# Patient Record
Sex: MALE | Race: WHITE | NOT HISPANIC OR LATINO | Employment: UNEMPLOYED | URBAN - METROPOLITAN AREA
[De-identification: names, ages, dates, MRNs, and addresses within clinical notes are randomized per-mention and may not be internally consistent; named-entity substitution may affect disease eponyms.]

---

## 2018-09-03 ENCOUNTER — OFFICE VISIT (OUTPATIENT)
Dept: URGENT CARE | Facility: URGENT CARE | Age: 53
End: 2018-09-03
Payer: COMMERCIAL

## 2018-09-03 VITALS
TEMPERATURE: 97.5 F | OXYGEN SATURATION: 98 % | HEART RATE: 124 BPM | SYSTOLIC BLOOD PRESSURE: 113 MMHG | RESPIRATION RATE: 18 BRPM | WEIGHT: 172 LBS | DIASTOLIC BLOOD PRESSURE: 82 MMHG

## 2018-09-03 DIAGNOSIS — R05.9 COUGH: Primary | ICD-10-CM

## 2018-09-03 PROCEDURE — 99203 OFFICE O/P NEW LOW 30 MIN: CPT | Performed by: INTERNAL MEDICINE

## 2018-09-03 RX ORDER — AZITHROMYCIN 250 MG/1
TABLET, FILM COATED ORAL
Qty: 6 TABLET | Refills: 0 | Status: SHIPPED | OUTPATIENT
Start: 2018-09-03 | End: 2020-01-20

## 2018-09-03 NOTE — PROGRESS NOTES
"SUBJECTIVE:  John Juárez is an 52 year old male who presents for cough and runny nose.  For about a week.  Cough is not productive of sputum.  No fevers.  Eating less than usual.  Vomited once.  No diarrhea.  No recent travel.  No medications taken for sxs.  No known exposures.  Has used ventolin inhaler which doesn't help cough.  Pt lives in a group home and is alone for visit and is somewhat poor historian.         has a past medical history of COPD (chronic obstructive pulmonary disease) (H); Heart disease; Hypertension; Substance abuse; and TBI (traumatic brain injury) (H). hypothyroidism. Possible mental illness. Constipation. Pt denies h/o seizures.  Pt give somewhat incomplete hx.    Social History     Social History     Marital status: Single     Spouse name: N/A     Number of children: N/A     Years of education: N/A     Social History Main Topics     Smoking status: Current Every Day Smoker     Packs/day: 0.50     Smokeless tobacco: Never Used     Alcohol use Yes      Comment: last use 1 hour ago\"     Drug use: Yes     Special: Marijuana      Comment: rare     Sexual activity: Not Asked     Other Topics Concern     None     Social History Narrative     History reviewed. No pertinent family history.    ALLERGIES:  Ibuprofen and Latex    Current Outpatient Prescriptions   Medication     GABAPENTIN PO     Omeprazole (PRILOSEC PO)   Thyroid mediation  Multiple other meds pt cannot recall.  No current facility-administered medications for this visit.          ROS:  ROS is done and is negative for general/constitutional, eye, ENT, Respiratory, cardiovascular, GI, , Skin, musculoskeletal except as noted elsewhere.  All other review of systems negative except as noted elsewhere.      OBJECTIVE:  /82 (BP Location: Left arm, Patient Position: Chair, Cuff Size: Adult Regular)  Pulse 124  Temp 97.5  F (36.4  C) (Oral)  Resp 18  Wt 172 lb (78 kg)  SpO2 98%  GENERAL APPEARANCE: Alert, in no acute " distress, somewhat un  EYES: normal  EARS: External ears normal. Canals clear. TM's normal.  NOSE:mild clear discharge and mildly inflamed mucosa  OROPHARYNX:normal, mmm  NECK:No adenopathy,masses or thyromegaly  RESP: intermittent coarse upper airway noise, no crackles or wheezing  CV:regular rate and rhythm and no murmurs, clicks, or gallops  ABDOMEN: Abdomen soft, non-tender. BS normal. No masses, organomegaly  SKIN: no ulcers, lesions or rash  MUSCULOSKELETAL:Musculoskeletal normal      RESULTS  Results for orders placed or performed during the hospital encounter of 03/25/15   Alcohol breath test POCT   Result Value Ref Range    Alcohol Breath Test 0.223 (A) 0.00 - 0.01   Alcohol breath test POCT   Result Value Ref Range    Alcohol Breath Test 0.171 (A) 0.00 - 0.01   .  No results found for this or any previous visit (from the past 48 hour(s)).    ASSESSMENT/PLAN:    ASSESSMENT / PLAN:  (R05) Cough  (primary encounter diagnosis)  Comment: has had sxs for about a week.  Overall c/w viral vs atypical URI.  Given pt's risk factors and other medical conditions including TBI and h/o alcohol abuse, will tx with zmax  Plan: azithromycin (ZITHROMAX) 250 MG tablet        Reviewed medication instructions and side effects. Follow up if experiences side effects. I reviewed supportive care, expected course, and signs of concern.  Follow up as needed or if he does not improve within 7 day(s) or if worsens in any way.  Reviewed red flag symptoms and is to go to the ER if experiences any of these.  Pt also to use his ventolin inhaler prn and continue all his routine medications at the group home.      See [facility] for orders, medications, letters, patient instructions    Lynda Burton M.D.

## 2018-09-03 NOTE — MR AVS SNAPSHOT
"              After Visit Summary   9/3/2018    John Juárez    MRN: [id number]           Patient Information     Date Of Birth          1965        Visit Information        Provider Department      9/3/2018 4:50 PM Lynda Burton MD [facility]        Today's Diagnoses     Cough    -  1      Care Instructions    Continue all your usual medications.    Add zithromax as prescribed for 5 days.    Use your ventolin inhaler as needed for cough.          Follow-ups after your visit        Follow-up notes from your care team     Return in about 1 week (around 9/10/2018), or if symptoms worsen or fail to improve, for follow up with primary doctor.      Who to contact     If you have questions or need follow up information about today's clinic visit or your schedule please contact [facility] directly at [phone].  Normal or non-critical lab and imaging results will be communicated to you by [facility], letter or phone within 4 business days after the clinic has received the results. If you do not hear from us within 7 days, please contact the clinic through [facility] or phone. If you have a critical or abnormal lab result, we will notify you by phone as soon as possible.  Submit refill requests through [facility] or call your pharmacy and they will forward the refill request to us. Please allow 3 business days for your refill to be completed.          Additional Information About Your Visit        MyChart Information     [facility] lets you send messages to your doctor, view your test results, renew your prescriptions, schedule appointments and more. To sign up, go to [email or web address] . Click on \"Log in\" on the left side of the screen, which will take you to the Welcome page. Then click on \"Sign up Now\" on the right side of the page.     You will be asked to enter the access code listed below, as well as some personal information. Please follow the directions to create " your username and password.     Your access code is: [email or web address]  Expires: 2018  5:15 PM     Your access code will  in 90 days. If you need help or a new code, please call your [facility] or [phone].        Care EveryWhere ID     This is your Care EveryWhere ID. This could be used by other organizations to access your [facility] medical records  [id number]        Your Vitals Were     Pulse Temperature Respirations Pulse Oximetry          124 97.5  F (36.4  C) (Oral) 18 98%         Blood Pressure from Last 3 Encounters:   18 113/82   03/25/15 113/79   03/08/15 (!) 155/95    Weight from Last 3 Encounters:   18 172 lb (78 kg)              Today, you had the following     No orders found for display         Today's Medication Changes          These changes are accurate as of 9/3/18  5:15 PM.  If you have any questions, ask your nurse or doctor.               Start taking these medicines.        Dose/Directions    azithromycin 250 MG tablet   Commonly known as:  ZITHROMAX   Used for:  Cough   Started by:  Lynda Burton MD        Two tablets first day, then one tablet daily for four days.   Quantity:  6 tablet   Refills:  0            Where to get your medicines      These medications were sent to [facility] [address] [place] [address]  [address] [facility] [address]     Phone:  [phone]     azithromycin 250 MG tablet                Primary Care Provider Office Phone # Fax #    [facility] [phone]        [facility] [address]        Equal Access to Services     MIALNA PRADO : Hadii erwin snyder Soarmando, waaxda luqadaha, qaybta kaalmada mary, terrell castellano. So [facility] [phone].    ATENCIÓN: Si habla español, tiene a mak disposición servicios gratuitos de asistencia lingüística. Llame al [phone].    We comply with applicable  federal civil rights laws and [place] laws. We do not discriminate on the basis of race, color, national origin, age, disability, sex, sexual orientation, or gender identity.            Thank you!     Thank you for choosing [facility]  for your care. Our goal is always to provide you with excellent care. Hearing back from our patients is one way we can continue to improve our services. Please take a few minutes to complete the written survey that you may receive in the mail after your visit with us. Thank you!             Your Updated Medication List - Protect others around you: Learn how to safely use, store and throw away your medicines at www.disposemymeds.org.          This list is accurate as of 9/3/18  5:15 PM.  Always use your most recent med list.                   Brand Name Dispense Instructions for use Diagnosis    azithromycin 250 MG tablet    ZITHROMAX    6 tablet    Two tablets first day, then one tablet daily for four days.    Cough       GABAPENTIN PO           PRILOSEC PO

## 2018-09-03 NOTE — PATIENT INSTRUCTIONS
Continue all your usual medications.    Add zithromax as prescribed for 5 days.    Use your ventolin inhaler as needed for cough.

## 2020-01-17 ENCOUNTER — MEDICAL CORRESPONDENCE (OUTPATIENT)
Dept: HEALTH INFORMATION MANAGEMENT | Facility: CLINIC | Age: 55
End: 2020-01-17

## 2020-01-20 ENCOUNTER — HOSPITAL ENCOUNTER (EMERGENCY)
Facility: CLINIC | Age: 55
Discharge: STILL ADMITTED OR ADMINISTRATIVE | End: 2020-01-21
Attending: FAMILY MEDICINE | Admitting: FAMILY MEDICINE
Payer: COMMERCIAL

## 2020-01-20 DIAGNOSIS — R46.89 AGGRESSIVE BEHAVIOR: ICD-10-CM

## 2020-01-20 DIAGNOSIS — F29 PSYCHOSIS, UNSPECIFIED PSYCHOSIS TYPE (H): ICD-10-CM

## 2020-01-20 LAB
ALBUMIN SERPL-MCNC: 3.7 G/DL (ref 3.4–5)
ALP SERPL-CCNC: 84 U/L (ref 40–150)
ALT SERPL W P-5'-P-CCNC: 59 U/L (ref 0–70)
ANION GAP SERPL CALCULATED.3IONS-SCNC: 7 MMOL/L (ref 3–14)
AST SERPL W P-5'-P-CCNC: NORMAL U/L (ref 0–45)
BASOPHILS # BLD AUTO: 0.1 10E9/L (ref 0–0.2)
BASOPHILS NFR BLD AUTO: 1.5 %
BILIRUB SERPL-MCNC: 0.4 MG/DL (ref 0.2–1.3)
BUN SERPL-MCNC: 26 MG/DL (ref 7–30)
CALCIUM SERPL-MCNC: 8.6 MG/DL (ref 8.5–10.1)
CHLORIDE SERPL-SCNC: 108 MMOL/L (ref 94–109)
CO2 SERPL-SCNC: 26 MMOL/L (ref 20–32)
CREAT SERPL-MCNC: 0.83 MG/DL (ref 0.66–1.25)
DIFFERENTIAL METHOD BLD: ABNORMAL
EOSINOPHIL # BLD AUTO: 1 10E9/L (ref 0–0.7)
EOSINOPHIL NFR BLD AUTO: 18.6 %
ERYTHROCYTE [DISTWIDTH] IN BLOOD BY AUTOMATED COUNT: 12.3 % (ref 10–15)
ETHANOL SERPL-MCNC: <0.01 G/DL
GFR SERPL CREATININE-BSD FRML MDRD: >90 ML/MIN/{1.73_M2}
GLUCOSE SERPL-MCNC: 74 MG/DL (ref 70–99)
HCT VFR BLD AUTO: 42.4 % (ref 40–53)
HGB BLD-MCNC: 14.8 G/DL (ref 13.3–17.7)
IMM GRANULOCYTES # BLD: 0 10E9/L (ref 0–0.4)
IMM GRANULOCYTES NFR BLD: 0.4 %
LYMPHOCYTES # BLD AUTO: 1.6 10E9/L (ref 0.8–5.3)
LYMPHOCYTES NFR BLD AUTO: 28.3 %
MCH RBC QN AUTO: 35 PG (ref 26.5–33)
MCHC RBC AUTO-ENTMCNC: 34.9 G/DL (ref 31.5–36.5)
MCV RBC AUTO: 100 FL (ref 78–100)
MONOCYTES # BLD AUTO: 0.4 10E9/L (ref 0–1.3)
MONOCYTES NFR BLD AUTO: 7.9 %
NEUTROPHILS # BLD AUTO: 2.4 10E9/L (ref 1.6–8.3)
NEUTROPHILS NFR BLD AUTO: 43.3 %
NRBC # BLD AUTO: 0 10*3/UL
NRBC BLD AUTO-RTO: 0 /100
PLATELET # BLD AUTO: 153 10E9/L (ref 150–450)
POTASSIUM SERPL-SCNC: 3.8 MMOL/L (ref 3.4–5.3)
PROT SERPL-MCNC: 7.9 G/DL (ref 6.8–8.8)
RBC # BLD AUTO: 4.23 10E12/L (ref 4.4–5.9)
SODIUM SERPL-SCNC: 141 MMOL/L (ref 133–144)
TSH SERPL DL<=0.005 MIU/L-ACNC: 1.34 MU/L (ref 0.4–4)
WBC # BLD AUTO: 5.5 10E9/L (ref 4–11)

## 2020-01-20 PROCEDURE — 25000132 ZZH RX MED GY IP 250 OP 250 PS 637: Performed by: FAMILY MEDICINE

## 2020-01-20 PROCEDURE — 25000125 ZZHC RX 250: Performed by: FAMILY MEDICINE

## 2020-01-20 PROCEDURE — 96365 THER/PROPH/DIAG IV INF INIT: CPT | Performed by: FAMILY MEDICINE

## 2020-01-20 PROCEDURE — 80320 DRUG SCREEN QUANTALCOHOLS: CPT | Performed by: FAMILY MEDICINE

## 2020-01-20 PROCEDURE — 25800025 ZZH RX 258: Performed by: FAMILY MEDICINE

## 2020-01-20 PROCEDURE — 80053 COMPREHEN METABOLIC PANEL: CPT | Performed by: FAMILY MEDICINE

## 2020-01-20 PROCEDURE — 99285 EMERGENCY DEPT VISIT HI MDM: CPT | Mod: Z6 | Performed by: FAMILY MEDICINE

## 2020-01-20 PROCEDURE — 85025 COMPLETE CBC W/AUTO DIFF WBC: CPT | Performed by: FAMILY MEDICINE

## 2020-01-20 PROCEDURE — 25000128 H RX IP 250 OP 636: Performed by: FAMILY MEDICINE

## 2020-01-20 PROCEDURE — 84443 ASSAY THYROID STIM HORMONE: CPT | Performed by: FAMILY MEDICINE

## 2020-01-20 PROCEDURE — 99285 EMERGENCY DEPT VISIT HI MDM: CPT | Mod: 25 | Performed by: FAMILY MEDICINE

## 2020-01-20 RX ORDER — MULTIPLE VITAMINS W/ MINERALS TAB 9MG-400MCG
1 TAB ORAL DAILY
Status: CANCELLED | OUTPATIENT
Start: 2020-01-21

## 2020-01-20 RX ORDER — GUAIFENESIN AND DEXTROMETHORPHAN HYDROBROMIDE 100; 10 MG/5ML; MG/5ML
10 SOLUTION ORAL EVERY 4 HOURS PRN
Status: ON HOLD | COMMUNITY
End: 2020-02-10

## 2020-01-20 RX ORDER — HYDROXYZINE HYDROCHLORIDE 10 MG/1
10 TABLET, FILM COATED ORAL 3 TIMES DAILY
Status: ON HOLD | COMMUNITY
End: 2020-02-10

## 2020-01-20 RX ORDER — OLANZAPINE 10 MG/1
10 TABLET, ORALLY DISINTEGRATING ORAL ONCE
Status: COMPLETED | OUTPATIENT
Start: 2020-01-20 | End: 2020-01-20

## 2020-01-20 RX ORDER — ASPIRIN 81 MG/1
81 TABLET ORAL DAILY
Status: ON HOLD | COMMUNITY
End: 2020-12-05

## 2020-01-20 RX ORDER — OLANZAPINE 10 MG/2ML
10 INJECTION, POWDER, FOR SOLUTION INTRAMUSCULAR
Status: CANCELLED | OUTPATIENT
Start: 2020-01-20

## 2020-01-20 RX ORDER — ATORVASTATIN CALCIUM 80 MG/1
80 TABLET, FILM COATED ORAL AT BEDTIME
Status: ON HOLD | COMMUNITY
End: 2020-12-05

## 2020-01-20 RX ORDER — HYDROXYZINE HYDROCHLORIDE 10 MG/1
10 TABLET, FILM COATED ORAL 3 TIMES DAILY
Status: CANCELLED | OUTPATIENT
Start: 2020-01-20

## 2020-01-20 RX ORDER — CHOLECALCIFEROL (VITAMIN D3) 50 MCG
1 TABLET ORAL DAILY
Status: ON HOLD | COMMUNITY
End: 2020-12-05

## 2020-01-20 RX ORDER — DIAZEPAM 5 MG
5-20 TABLET ORAL EVERY 30 MIN PRN
Status: CANCELLED | OUTPATIENT
Start: 2020-01-20

## 2020-01-20 RX ORDER — ATORVASTATIN CALCIUM 80 MG/1
80 TABLET, FILM COATED ORAL AT BEDTIME
Status: CANCELLED | OUTPATIENT
Start: 2020-01-20

## 2020-01-20 RX ORDER — ASPIRIN 81 MG/1
81 TABLET ORAL DAILY
Status: CANCELLED | OUTPATIENT
Start: 2020-01-21

## 2020-01-20 RX ORDER — OLANZAPINE 5 MG/1
5 TABLET ORAL AT BEDTIME
Status: CANCELLED | OUTPATIENT
Start: 2020-01-20

## 2020-01-20 RX ORDER — PETROLATUM,WHITE/LANOLIN
OINTMENT (GRAM) TOPICAL
Status: ON HOLD | COMMUNITY
End: 2020-02-10

## 2020-01-20 RX ORDER — LEVOTHYROXINE SODIUM 88 UG/1
88 TABLET ORAL DAILY
Status: CANCELLED | OUTPATIENT
Start: 2020-01-21

## 2020-01-20 RX ORDER — OLANZAPINE 5 MG/1
5 TABLET ORAL AT BEDTIME
Status: ON HOLD | COMMUNITY
End: 2020-02-10

## 2020-01-20 RX ORDER — NICOTINE 21 MG/24HR
1 PATCH, TRANSDERMAL 24 HOURS TRANSDERMAL DAILY
Status: CANCELLED | OUTPATIENT
Start: 2020-01-21

## 2020-01-20 RX ORDER — VITAMIN B COMPLEX
2000 TABLET ORAL DAILY
Status: CANCELLED | OUTPATIENT
Start: 2020-01-21

## 2020-01-20 RX ORDER — TRAZODONE HYDROCHLORIDE 50 MG/1
50 TABLET, FILM COATED ORAL
Status: CANCELLED | OUTPATIENT
Start: 2020-01-20

## 2020-01-20 RX ORDER — LANOLIN ALCOHOL/MO/W.PET/CERES
100 CREAM (GRAM) TOPICAL DAILY
Status: CANCELLED | OUTPATIENT
Start: 2020-01-21

## 2020-01-20 RX ORDER — LORAZEPAM 1 MG/1
1 TABLET ORAL ONCE
Status: COMPLETED | OUTPATIENT
Start: 2020-01-20 | End: 2020-01-20

## 2020-01-20 RX ORDER — OLANZAPINE 5 MG/1
10 TABLET ORAL
Status: CANCELLED | OUTPATIENT
Start: 2020-01-20

## 2020-01-20 RX ORDER — LEVOTHYROXINE SODIUM 88 UG/1
88 TABLET ORAL DAILY
Status: ON HOLD | COMMUNITY
End: 2020-12-05

## 2020-01-20 RX ORDER — GUAIFENESIN AND DEXTROMETHORPHAN HYDROBROMIDE 100; 10 MG/5ML; MG/5ML
10 SOLUTION ORAL EVERY 4 HOURS PRN
Status: CANCELLED | OUTPATIENT
Start: 2020-01-20

## 2020-01-20 RX ORDER — PETROLATUM,WHITE/LANOLIN
OINTMENT (GRAM) TOPICAL 2 TIMES DAILY PRN
Status: CANCELLED | OUTPATIENT
Start: 2020-01-20

## 2020-01-20 RX ORDER — HYDROXYZINE HYDROCHLORIDE 50 MG/1
50 TABLET, FILM COATED ORAL 4 TIMES DAILY PRN
Status: ON HOLD | COMMUNITY
End: 2020-02-10

## 2020-01-20 RX ORDER — FOLIC ACID 1 MG/1
1 TABLET ORAL DAILY
Status: CANCELLED | OUTPATIENT
Start: 2020-01-21

## 2020-01-20 RX ORDER — ACETAMINOPHEN 325 MG/1
650 TABLET ORAL EVERY 4 HOURS PRN
Status: CANCELLED | OUTPATIENT
Start: 2020-01-20

## 2020-01-20 RX ORDER — ALUMINA, MAGNESIA, AND SIMETHICONE 2400; 2400; 240 MG/30ML; MG/30ML; MG/30ML
30 SUSPENSION ORAL EVERY 4 HOURS PRN
Status: CANCELLED | OUTPATIENT
Start: 2020-01-20

## 2020-01-20 RX ORDER — HYDROXYZINE HYDROCHLORIDE 50 MG/1
50 TABLET, FILM COATED ORAL 4 TIMES DAILY PRN
Status: CANCELLED | OUTPATIENT
Start: 2020-01-20

## 2020-01-20 RX ADMIN — LORAZEPAM 1 MG: 1 TABLET ORAL at 19:22

## 2020-01-20 RX ADMIN — OLANZAPINE 10 MG: 10 TABLET, ORALLY DISINTEGRATING ORAL at 14:44

## 2020-01-20 RX ADMIN — FOLIC ACID: 5 INJECTION, SOLUTION INTRAMUSCULAR; INTRAVENOUS; SUBCUTANEOUS at 15:48

## 2020-01-20 RX ADMIN — OLANZAPINE 10 MG: 10 TABLET, ORALLY DISINTEGRATING ORAL at 19:21

## 2020-01-20 NOTE — ED NOTES
Requested pt's medication list be faxed to [facility] ED #[phone].  Kurt agreed to fax it over to clarify doses and med changes on Jan 17, 2020.

## 2020-01-20 NOTE — ED PROVIDER NOTES
"  History     Chief Complaint   Patient presents with     Hallucinations     Pt states he is depressed and anxious.   Hearing voices and has verbal aggression.   Lives @ [facility]  John Juárez is a 54 year old male who is a resident of a group home.  He has a history of neuro-cognitive deficits on a longstanding basis related to prior traumatic brain injury, psychosis, alcoholism in remission, COPD, major depression, polysubstance abuse, aortic and mitral valve replacement, stable as of recent cardiology visit 1/14/2020.  He was recently admitted for 5 days at [facility] January 2, 2022 January 7 after he presented there with auditory visual hallucinations and confusion.  Was reportedly responding to internal stimuli initially, responded to Haldol.  Extensive medical work-up including head CT and labs was negative.  They did not feel there was any evidence of Warnicke Korsakoff syndrome or acute cerebrovascular accident or other acute medical condition.  Discharged back to [facility] [place], operated by [facility].  Since his return, the staff there do not believe he is returned to baseline.  He continues to have obvious evidence of visual and auditory hallucinations, \"talking to people are not there\".  He is becoming more agitated with other residents, and 3 occasions the police were called.  Today per staff from [facility] the patient seemed more confused than usual.  He tried to go into the room that was not his, and at other times was staring blankly and appeared \"catatonic\".  Was also experiencing a right hand tremor which is more prominent than usual, is continuing to pace up and down the cruz talking to himself more than usual.  He eventually became aggressive towards another resident or nobody was hurt.  He also has tried to walk out of the group home without issues.  On another occasion he attempted to light a cigarette in his room which is against the rules.  There was some concern that he may have somehow " "gained access to drugs or alcohol.  Apparently has Haldol was switched to olanzapine recently and hydroxyzine was discontinued.  The patient himself states \"I talk to other people who are not there all the time\".  He tells me this is because he received a gift at a [employer] some years back.  He admits he communicates with the police and his sister.  He denies any change in this thought process.  He denies any thoughts of harm to self or others.  He denies any medical complaints including falls, headache, numbness, weakness, tingling, chest pain or abdominal pain.  He states he is uncertain what medications he takes but reports he takes what he is given by the staff.    I have reviewed the Medications, Allergies, Past Medical and Surgical History, and Social History in the Epic system.    Review of Systems  All other systems were reviewed and are negative    Physical Exam   BP: 121/85  Pulse: 99  Temp: 97  F (36.1  C)  Resp: 18  Weight: 69.2 kg (152 lb 9 oz)  SpO2: 100 %      Physical Exam  Constitutional:       General: He is not in acute distress.     Appearance: He is not diaphoretic.      Comments: Patient is somewhat disheveled appearing, but does not respond to internal stimuli during my interview   HENT:      Head: Atraumatic.   Eyes:      General: No scleral icterus.     Pupils: Pupils are equal, round, and reactive to light.   Cardiovascular:      Heart sounds: Normal heart sounds.   Pulmonary:      Effort: No respiratory distress.      Breath sounds: Normal breath sounds.   Abdominal:      General: Bowel sounds are normal.      Palpations: Abdomen is soft.      Tenderness: There is no abdominal tenderness.   Musculoskeletal:         General: No tenderness.   Skin:     General: Skin is warm.      Findings: No rash.   Neurological:      General: No focal deficit present.      Mental Status: Mental status is at baseline. He is disoriented.      Motor: Tremor (There is minimal tremulousness of " his right upper extremity) present. No abnormal muscle tone, seizure activity or pronator drift.      Gait: Gait abnormal (Shuffling gait but does not appear unsteady).      Comments: Patient oriented to name and the fact that he is at the hospital.  He cannot tell me the date.  In reviewing the chart this appears likely to be baseline due to his chronic neurocognitive deficits.   Psychiatric:         Attention and Perception: Attention normal.         Mood and Affect: Mood normal.         Speech: Speech is tangential.         Behavior: Behavior is withdrawn.         Thought Content: Thought content is paranoid and delusional ( Appears to respond to internal stimuli.  Talking to himself in the room.). Thought content does not include homicidal or suicidal ideation.         Cognition and Memory: Cognition is impaired (Baseline neurocognitive deficits).         Judgment: Judgment is inappropriate.         ED Course        Procedures             Critical Care time:  none             Labs Ordered and Resulted from Time of ED Arrival Up to the Time of Departure from the ED   CBC WITH PLATELETS DIFFERENTIAL - Abnormal; Notable for the following components:       Result Value    RBC Count 4.23 (*)     MCH 35.0 (*)     Absolute Eosinophils 1.0 (*)     All other components within normal limits   COMPREHENSIVE METABOLIC PANEL   ALCOHOL ETHYL   TSH WITH FREE T4 REFLEX   DRUG ABUSE SCREEN 6 CHEM DEP URINE ([facility])   UA MACROSCOPIC WITH REFLEX TO MICRO AND CULTURE            Assessments & Plan (with Medical Decision Making)   Patient with a history of neurocognitive deficits due to remote traumatic brain injury, multiple medical problems, depression, psychosis, substance abuse in remission who is a member of a group home.  He had an acute change in his mental status with confusion, increasing hallucinations, and intermittent catatonic spells starting in late December.  He was admitted at a different hospital for 5 days and started  on Haldol but then the symptoms recurred.  He recently is exhibiting increasing hallucinations some episodes of aggression and some difficulty being redirected.  Attempts at medication adjustment by his outpatient provider (changing from Haldol to Zyprexa) has not proved helpful.  Group home staff does not believe he has access to substances and there is no evidence of substance intoxication in the ED today.  He is redirectable here but does appear to respond to internal stimuli, and at other times is staring blankly.  He is oriented to name and the fact that he is in the hospital only.  He has no new focal neurologic deficits.  He is able to ambulate without assistance.  His labs are unremarkable.  In speaking with the staff from the group home he has been considerably more difficult to redirect, showing more episodes of response to internal stimuli, periodic catatonic episodes, and inappropriate behavior such as attempting to walk out of the group home without shoes on.  He clearly lacks insight and judgment and is exhibiting some signs of psychosis here in the ED.  His TBI with neurocognitive deficits are chronic but may be contributing to his issues.  He does not appear appropriate for discharge back to outpatient as he has not responded to outpatient medication adjustment attempts.  Will admit on 72-hour hold.    I have reviewed the nursing notes.    I have reviewed the findings, diagnosis, plan and need for follow up with the patient.    New Prescriptions    No medications on file       Final diagnoses:   Psychosis, unspecified psychosis type (H)   Aggressive behavior       1/20/2020   [facility], [place], EMERGENCY DEPARTMENT     Deepak Karimi MD  01/20/20 7340

## 2020-01-20 NOTE — ED NOTES
Assisted ER MD with gathering collateral from [employer].  Spoke with Kurt from [facility] where pt has lived for the past 3 months.  He reports that pt's hallucination sx (talking to himself) ) continued after his [facility] hospitalization Jan 2-7, 2020.  He also noted that his outpt providers took pt off the Haldol and replaced it with Olanzipine.  It was believed that he was taken off the hydroxyzine as well.  Since there has been more confusion and agitation. Over the past couple days, pt has been disoriented and has gotten physically aggressive with other resident and tried to go outside to smoke without his shoes; thus staff are concerned about being able to keep pt safe.  They are in the process of getting a new psychiatric provider, but that appt with Dr Torres at [facility] is not until Feb 2020.  The  staff is hoping for medication suggestions and Kurt indicates that patient may return to their facility today.  ASIM BishopSW

## 2020-01-21 ENCOUNTER — HOSPITAL ENCOUNTER (INPATIENT)
Facility: CLINIC | Age: 55
LOS: 21 days | Discharge: NURSING, REHAB OR LONG-TERM CARE | DRG: 885 | End: 2020-02-11
Attending: PSYCHIATRY & NEUROLOGY | Admitting: PSYCHIATRY & NEUROLOGY
Payer: COMMERCIAL

## 2020-01-21 VITALS
TEMPERATURE: 98.7 F | SYSTOLIC BLOOD PRESSURE: 118 MMHG | WEIGHT: 152.56 LBS | HEART RATE: 97 BPM | DIASTOLIC BLOOD PRESSURE: 76 MMHG | OXYGEN SATURATION: 99 % | RESPIRATION RATE: 16 BRPM

## 2020-01-21 DIAGNOSIS — F29 PSYCHOSIS, UNSPECIFIED PSYCHOSIS TYPE (H): Primary | ICD-10-CM

## 2020-01-21 LAB
ALBUMIN UR-MCNC: NEGATIVE MG/DL
AMPHETAMINES UR QL SCN: NEGATIVE
APPEARANCE UR: CLEAR
BARBITURATES UR QL: NEGATIVE
BENZODIAZ UR QL: NEGATIVE
BILIRUB UR QL STRIP: NEGATIVE
CANNABINOIDS UR QL SCN: NEGATIVE
COCAINE UR QL: NEGATIVE
COLOR UR AUTO: YELLOW
ETHANOL UR QL SCN: NEGATIVE
GLUCOSE UR STRIP-MCNC: NEGATIVE MG/DL
HGB UR QL STRIP: NEGATIVE
KETONES UR STRIP-MCNC: NEGATIVE MG/DL
LEUKOCYTE ESTERASE UR QL STRIP: NEGATIVE
NITRATE UR QL: NEGATIVE
OPIATES UR QL SCN: NEGATIVE
PH UR STRIP: 6.5 PH (ref 5–7)
SOURCE: NORMAL
SP GR UR STRIP: 1.01 (ref 1–1.03)
UROBILINOGEN UR STRIP-MCNC: NORMAL MG/DL (ref 0–2)

## 2020-01-21 PROCEDURE — 80320 DRUG SCREEN QUANTALCOHOLS: CPT | Performed by: FAMILY MEDICINE

## 2020-01-21 PROCEDURE — 25000132 ZZH RX MED GY IP 250 OP 250 PS 637: Performed by: EMERGENCY MEDICINE

## 2020-01-21 PROCEDURE — 80307 DRUG TEST PRSMV CHEM ANLYZR: CPT | Performed by: FAMILY MEDICINE

## 2020-01-21 PROCEDURE — 25000132 ZZH RX MED GY IP 250 OP 250 PS 637: Performed by: PSYCHIATRY & NEUROLOGY

## 2020-01-21 PROCEDURE — 12400000 ZZH R&B MH

## 2020-01-21 PROCEDURE — 81003 URINALYSIS AUTO W/O SCOPE: CPT | Performed by: FAMILY MEDICINE

## 2020-01-21 RX ORDER — OLANZAPINE 10 MG/1
10 TABLET, ORALLY DISINTEGRATING ORAL ONCE
Status: COMPLETED | OUTPATIENT
Start: 2020-01-21 | End: 2020-01-21

## 2020-01-21 RX ORDER — OLANZAPINE 5 MG/1
5 TABLET, ORALLY DISINTEGRATING ORAL EVERY 6 HOURS PRN
Status: DISCONTINUED | OUTPATIENT
Start: 2020-01-21 | End: 2020-01-23

## 2020-01-21 RX ORDER — ASPIRIN 81 MG/1
81 TABLET ORAL DAILY
Status: DISCONTINUED | OUTPATIENT
Start: 2020-01-21 | End: 2020-02-11 | Stop reason: HOSPADM

## 2020-01-21 RX ORDER — OLANZAPINE 5 MG/1
5 TABLET ORAL AT BEDTIME
Status: DISCONTINUED | OUTPATIENT
Start: 2020-01-21 | End: 2020-01-22

## 2020-01-21 RX ORDER — LEVOTHYROXINE SODIUM 88 UG/1
88 TABLET ORAL
Status: DISCONTINUED | OUTPATIENT
Start: 2020-01-21 | End: 2020-02-11 | Stop reason: HOSPADM

## 2020-01-21 RX ORDER — CHOLECALCIFEROL (VITAMIN D3) 50 MCG
2000 TABLET ORAL DAILY
Status: DISCONTINUED | OUTPATIENT
Start: 2020-01-21 | End: 2020-02-11 | Stop reason: HOSPADM

## 2020-01-21 RX ORDER — GUAIFENESIN/DEXTROMETHORPHAN 100-10MG/5
10 SYRUP ORAL EVERY 4 HOURS PRN
Status: DISCONTINUED | OUTPATIENT
Start: 2020-01-21 | End: 2020-02-11 | Stop reason: HOSPADM

## 2020-01-21 RX ORDER — ATORVASTATIN CALCIUM 40 MG/1
80 TABLET, FILM COATED ORAL AT BEDTIME
Status: DISCONTINUED | OUTPATIENT
Start: 2020-01-21 | End: 2020-02-11 | Stop reason: HOSPADM

## 2020-01-21 RX ORDER — HYDROXYZINE HYDROCHLORIDE 50 MG/1
50 TABLET, FILM COATED ORAL 4 TIMES DAILY PRN
Status: DISCONTINUED | OUTPATIENT
Start: 2020-01-21 | End: 2020-02-11 | Stop reason: HOSPADM

## 2020-01-21 RX ADMIN — OLANZAPINE 10 MG: 10 TABLET, ORALLY DISINTEGRATING ORAL at 15:23

## 2020-01-21 RX ADMIN — CHOLECALCIFEROL TAB 50 MCG (2000 UNIT) 2000 UNITS: 50 TAB at 19:31

## 2020-01-21 RX ADMIN — ASPIRIN 81 MG: 81 TABLET, COATED ORAL at 19:31

## 2020-01-21 RX ADMIN — OLANZAPINE 5 MG: 5 TABLET, ORALLY DISINTEGRATING ORAL at 19:31

## 2020-01-21 ASSESSMENT — ACTIVITIES OF DAILY LIVING (ADL)
TOILETING: 0-->INDEPENDENT
BATHING: 0-->INDEPENDENT
COGNITION: 2 - DIFFICULTY WITH ORGANIZING THOUGHTS
FALL_HISTORY_WITHIN_LAST_SIX_MONTHS: NO
RETIRED_COMMUNICATION: 2-->DIFFICULTY UNDERSTANDING AND SPEAKING (NOT RELATED TO LANGUAGE BARRIER)
DRESS: 0-->INDEPENDENT
AMBULATION: 0-->INDEPENDENT
RETIRED_EATING: 0-->INDEPENDENT
WHICH_OF_THE_ABOVE_FUNCTIONAL_RISKS_HAD_A_RECENT_ONSET_OR_CHANGE?: COGNITION;COMMUNICATION/SPEECH
SWALLOWING: 0-->SWALLOWS FOODS/LIQUIDS WITHOUT DIFFICULTY
TRANSFERRING: 0-->INDEPENDENT

## 2020-01-21 NOTE — ED NOTES
"On assessment pt states he is not having hallucinations, that they are real/live auditory noises. He states \"[facility] is being arrested right now, call them and you will see\".   "

## 2020-01-21 NOTE — PHARMACY-ADMISSION MEDICATION HISTORY
Pharmacy Medication History  Admission medication history interview status for the 1/21/2020  admission is complete. See EPIC admission navigator for prior to admission medications     Medication history sources: Med history complete by [place] ED pharmacist, copied/entered last doses pta  Medication history source reliability: Good  Adherence assessment: Good    Significant changes made to the medication list:  none      Additional medication history information:   none    Medication reconciliation completed by provider prior to medication history? No    Time spent in this activity: 15 minutes      Prior to Admission medications    Medication Sig Last Dose Taking? Auth Provider   aspirin 81 MG EC tablet Take 81 mg by mouth daily 1/20/2020 at am Yes Unknown, Entered By History   atorvastatin (LIPITOR) 80 MG tablet Take 80 mg by mouth At Bedtime 1/19 at pm Yes Unknown, Entered By History   Dextromethorphan-guaiFENesin  MG/5ML syrup Take 10 mLs by mouth every 4 hours as needed for cough  at prn Yes Unknown, Entered By History   hydrOXYzine (ATARAX) 10 MG tablet Take 10 mg by mouth 3 times daily 1/20/2020 at am Yes Unknown, Entered By History   hydrOXYzine (ATARAX) 50 MG tablet Take 50 mg by mouth 4 times daily as needed for anxiety   at prn Yes Unknown, Entered By History   levothyroxine (SYNTHROID/LEVOTHROID) 88 MCG tablet Take 88 mcg by mouth daily 1/20/2020 at am Yes Unknown, Entered By History   OLANZapine (ZYPREXA) 5 MG tablet Take 5 mg by mouth At Bedtime 1/19/2020 at pm Yes Unknown, Entered By History   salmeterol (SEREVENT) 50 MCG/DOSE inhaler Inhale 1 puff into the lungs 2 times daily 1/20/2020 at am Yes Unknown, Entered By History   vitamin A & D (BABY) external ointment Apply topically to dry, reddened or rash areas as needed  at prn Yes Unknown, Entered By History   vitamin D3 (CHOLECALCIFEROL) 2000 units (50 mcg) tablet Take 1 tablet by mouth daily 1/20/2020 at am Yes Unknown, Entered By History      Marilyn Puckett, PharmD  Inpatient Clinical Pharmacist  [phone]

## 2020-01-21 NOTE — ED NOTES
The pt was signed out at shift change pending inpt bed availability. He rested comfortably throughout the night without difficulty. The pt will be signed out again at shift change, still awaiting and inpt bed.       Elva Elliott MD  01/21/20 0796

## 2020-01-21 NOTE — ED NOTES
Patient is currently a boarder in the ED.   Patient was offered hygiene supplies: The patient is currently asleep. Hygiene supplies will be offered to the patient when they wake up.  Patient was offered to ambulate: Patient is asleep at this time.  Patient was ordered a breakfast tray: Yes, the patient will be given their breakfast tray when the trays arrive.

## 2020-01-21 NOTE — ED NOTES
ED to Behavioral Floor Handoff    SITUATION  John Juárez is a 54 year old male who speaks English and lives in a group home with others The patient arrived in the ED by private car from home with a complaint of Hallucinations (Pt states he is depressed and anxious.   Hearing voices and has verbal aggression.   Lives @ [facility])  .The patient's current symptoms started/worsened 3 week(s) ago and during this time the symptoms have increased.   In the ED, pt was diagnosed with   Final diagnoses:   Psychosis, unspecified psychosis type (H)   Aggressive behavior        Initial vitals were: BP: 121/85  Pulse: 99  Temp: 97  F (36.1  C)  Resp: 18  Weight: 69.2 kg (152 lb 9 oz)  SpO2: 100 %   --------  Is the patient diabetic? No   If yes, last blood glucose? --     If yes, was this treated in the ED? --  --------  Is the patient inebriated (ETOH) No or Impaired on other substances? No  MSSA done? N/A  Last MSSA score: --    Were withdrawal symptoms treated? N/A  Does the patient have a seizure history? No. If yes, date of most recent seizure--  --------  Is the patient patient experiencing suicidal ideation? denies current or recent suicidal ideation     Homicidal ideation? denies current or recent homicidal ideation or behaviors.    Self-injurious behavior/urges? denies current or recent self injurious behavior or ideation.  ------  Was pt aggressive in the ED No  Was a code called No  Is the pt now cooperative? Yes  -------  Meds given in ED:   Medications   OLANZapine zydis (zyPREXA) ODT tab 10 mg (10 mg Oral Given 1/20/20 1444)   dextrose 5% and 0.45% NaCl 1,000 mL with Infuvite Adult 10 mL, thiamine 100 mg, folic acid 1 mg infusion ( Intravenous Stopped 1/20/20 1700)   OLANZapine zydis (zyPREXA) ODT tab 10 mg (10 mg Oral Given 1/20/20 1921)   LORazepam (ATIVAN) tablet 1 mg (1 mg Oral Given 1/20/20 1922)      Family present during ED course? No  Family currently present? No    BACKGROUND  Does the  patient have a cognitive impairment or developmental disability? No  Allergies:   Allergies   Allergen Reactions     Ibuprofen      Latex    .   Social demographics are   Social History     Socioeconomic History     Marital status: Single     Spouse name: None     Number of children: None     Years of education: None     Highest education level: None   Occupational History     None   Social Needs     Financial resource strain: None     Food insecurity:     Worry: None     Inability: None     Transportation needs:     Medical: None     Non-medical: None   Tobacco Use     Smoking status: Current Every Day Smoker     Packs/day: 0.50     Smokeless tobacco: Never Used   Substance and Sexual Activity     Alcohol use: Not Currently     Drug use: Not Currently     Comment: rare     Sexual activity: None   Lifestyle     Physical activity:     Days per week: None     Minutes per session: None     Stress: None   Relationships     Social connections:     Talks on phone: None     Gets together: None     Attends [employer] service: None     Active member of club or organization: None     Attends meetings of clubs or organizations: None     Relationship status: None     Intimate partner violence:     Fear of current or ex partner: None     Emotionally abused: None     Physically abused: None     Forced sexual activity: None   Other Topics Concern     None   Social History Narrative     None        ASSESSMENT  Labs results   Labs Ordered and Resulted from Time of ED Arrival Up to the Time of Departure from the ED   CBC WITH PLATELETS DIFFERENTIAL - Abnormal; Notable for the following components:       Result Value    RBC Count 4.23 (*)     MCH 35.0 (*)     Absolute Eosinophils 1.0 (*)     All other components within normal limits   COMPREHENSIVE METABOLIC PANEL   ALCOHOL ETHYL   TSH WITH FREE T4 REFLEX   DRUG ABUSE SCREEN 6 CHEM DEP URINE ([facility])   UA MACROSCOPIC WITH REFLEX TO MICRO AND CULTURE      Imaging Studies: No results found  for this or any previous visit (from the past 24 hour(s)).   Most recent vital signs /85   Pulse 99   Temp 97  F (36.1  C) (Oral)   Resp 18   Wt 69.2 kg (152 lb 9 oz)   SpO2 100%    Abnormal labs/tests/findings requiring intervention:---   Pain control: pt had none  Nausea control: pt had none    RECOMMENDATION  Are any infection precautions needed (MRSA, VRE, etc.)? No If yes, what infection? --  ---  Does the patient have mobility issues? independently. If yes, what device does the pt use? ---  ---  Is patient on 72 hour hold or commitment? Yes If on 72 hour hold, have hold and rights been given to patient? Yes  Are admitting orders written if after 10 p.m. ?N/A  Tasks needing to be completed:---     Zofia Mao RN   ascom--    [phone] [facility]   [phone] [facility] ED

## 2020-01-21 NOTE — PHARMACY-ADMISSION MEDICATION HISTORY
Admission medication history for the January 20, 2020 admission is complete.     Interview Sources:  MAR sent from [facility], Care Everywhere    Reliability of Source: Good    Medication Adherence: Good    Current Outpatient Pharmacy: [facility] nursing home    Changes made to PTA medication list (reason)  Added: all medications on list  Deleted:   - Azithromycin 250 mg: Take 2 tabs PO on the first day, then take 1 tab PO every day for four days  - Gabapentin PO: no sig  - Omeprazole PO: no sig  Changed: N/A    Additional medication history information:   - [facility] sent a MAR with medications and admin times/dates.   - Hydroxyzine 50 mg: the MAR started on 1/1/2020 and there were no administrations of this medication since then.    Prior to Admission Medication List:  Prior to Admission medications    Medication Sig Last Dose Taking? Auth Provider   aspirin 81 MG EC tablet Take 81 mg by mouth daily 1/20/2020 Yes Unknown, Entered By History   atorvastatin (LIPITOR) 80 MG tablet Take 80 mg by mouth At Bedtime 1/19/2020 Yes Unknown, Entered By History   hydrOXYzine (ATARAX) 10 MG tablet Take 10 mg by mouth 3 times daily 1/20/2020 Yes Unknown, Entered By History   hydrOXYzine (ATARAX) 50 MG tablet Take 50 mg by mouth 4 times daily as needed for anxiety  Past Month Yes Unknown, Entered By History   levothyroxine (SYNTHROID/LEVOTHROID) 88 MCG tablet Take 88 mcg by mouth daily 1/20/2020 Yes Unknown, Entered By History   OLANZapine (ZYPREXA) 5 MG tablet Take 5 mg by mouth At Bedtime 1/19/2020 Yes Unknown, Entered By History   salmeterol (SEREVENT) 50 MCG/DOSE inhaler Inhale 1 puff into the lungs 2 times daily 1/20/2020 Yes Unknown, Entered By History   vitamin D3 (CHOLECALCIFEROL) 2000 units (50 mcg) tablet Take 1 tablet by mouth daily 1/20/2020 Yes Unknown, Entered By History   Dextromethorphan-guaiFENesin  MG/5ML syrup Take 10 mLs by mouth every 4 hours as needed for cough Unknown  Unknown,  Entered By History   vitamin A & D (BABY) external ointment Apply topically to dry, reddened or rash areas as needed Unknown  Unknown, Entered By History       Time spent: 15 minutes    Medication history completed by:   Yola Londono - Pharmacy Intern (PD2)

## 2020-01-21 NOTE — ED NOTES
"This RN went to get patient's vitals and inform patient that he is getting transferred to [facility] for admission and re-enforced the fact that he is only a 72 hour hold. Patient started to get agitated and states that \"I am not going anymore. My ride is going to be here in 15 minutes. Why do you need to do this, I am fine, I promise\".   "
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
## 2020-01-22 PROCEDURE — 99222 1ST HOSP IP/OBS MODERATE 55: CPT | Performed by: PHYSICIAN ASSISTANT

## 2020-01-22 PROCEDURE — 25000132 ZZH RX MED GY IP 250 OP 250 PS 637: Performed by: PSYCHIATRY & NEUROLOGY

## 2020-01-22 PROCEDURE — 99223 1ST HOSP IP/OBS HIGH 75: CPT | Mod: AI | Performed by: PSYCHIATRY & NEUROLOGY

## 2020-01-22 PROCEDURE — 12400000 ZZH R&B MH

## 2020-01-22 RX ORDER — ACETAMINOPHEN 325 MG/1
650 TABLET ORAL EVERY 6 HOURS PRN
Status: DISCONTINUED | OUTPATIENT
Start: 2020-01-22 | End: 2020-02-11 | Stop reason: HOSPADM

## 2020-01-22 RX ORDER — OLANZAPINE 10 MG/1
10 TABLET ORAL AT BEDTIME
Status: DISCONTINUED | OUTPATIENT
Start: 2020-01-22 | End: 2020-01-23

## 2020-01-22 RX ADMIN — ATORVASTATIN CALCIUM 80 MG: 40 TABLET, FILM COATED ORAL at 20:02

## 2020-01-22 RX ADMIN — SALMETEROL XINAFOATE 1 PUFF: 50 POWDER, METERED ORAL; RESPIRATORY (INHALATION) at 08:12

## 2020-01-22 RX ADMIN — SALMETEROL XINAFOATE 1 PUFF: 50 POWDER, METERED ORAL; RESPIRATORY (INHALATION) at 20:04

## 2020-01-22 RX ADMIN — ASPIRIN 81 MG: 81 TABLET, COATED ORAL at 08:08

## 2020-01-22 RX ADMIN — LEVOTHYROXINE SODIUM 88 MCG: 88 TABLET ORAL at 08:08

## 2020-01-22 RX ADMIN — OLANZAPINE 10 MG: 10 TABLET, FILM COATED ORAL at 20:02

## 2020-01-22 RX ADMIN — CHOLECALCIFEROL TAB 50 MCG (2000 UNIT) 2000 UNITS: 50 TAB at 08:08

## 2020-01-22 RX ADMIN — OLANZAPINE 5 MG: 5 TABLET, ORALLY DISINTEGRATING ORAL at 13:51

## 2020-01-22 NOTE — PLAN OF CARE
BEHAVIORAL TEAM DISCUSSION    Participants:  dr tomlin, RN's, CTC (jasper gallegos), OT (silvina), manager (joanne briones)  Progress: new admit.    Here due to psychosis and confusion.   Pt does not want to stay [facility] 72 hour hold.  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      He was hospitalized for 5 days (1/2-1/7/2020).  Extensive work-up completed during that admission including head CT and labs that were all negative.     TBI hx.  Precautions:   Behavioral Orders   Procedures    Assault precautions    Code 1 - Restrict to Unit    Routine Programming     As clinically indicated    Status 15     Every 15 minutes.   Plan:   meds per dr tomlin   -File petitions for MI commitment and Yanez, through [facility].  -Determine name of provider at [facility] and ensure next appt is scheduled.  -Communicate with TBI  as needed  -Communicate with pt's group home staff  [phone]    Rationale for change in precautions or plan:no change at this this time.

## 2020-01-22 NOTE — PLAN OF CARE
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 He is still experiencing auditory hallucinations, isolative and withdrawn.    [facility] called to report his behavior and auditory hallucinations are worse since January.  Was hospitalized at [facility] at the beginning of January.

## 2020-01-22 NOTE — CONSULTS
[facility]  Consult Note - Hospitalist Service     Date of Admission:  1/21/2020  Consult Requested by: Dr. Batres  Reason for Consult: Psych medical admission H&P    Assessment & Plan   John Juárez is a 54 year old male admitted on 1/21/2020.     Past medical history significant for cognitive deficits, alcoholism in remission, COPD, HTN, HLP, hepatitis C, hypothyroidism, HFrEF, history of CVA involving basal ganglia, history of psychosis, history of TBI, history of subarachnoid hemorrhage, history of infective endocarditis of the mitral and aortic valve aortic s/p replacement who was directly admitted to [facility] inpatient psych due to psychosis.    Notably [facility] for psychosis with auditory hallucinations and placed on a 72 hour hold.  He was hospitalized for 5 days (1/2-1/7/2020).  Extensive work-up completed during that admission including head CT and labs that were all negative.  He was discharged back to his group home with a prescription of Haldol which was subsequently changed to Zyprexa.      He currently resides at a group home [facility] ([phone]) where staff had called 911 due to hallucinations.      Psychosis, recurrent in the setting of known neurocognitive deficits and history of TBI:  Again patient was hospitalized earlier this month (1/2020) at [facility] for a similar presentation.  He is complaining of hearing voices and experiencing depression and anxiety.  He has become verbally aggressive at his group home.    --Psych will manage.   --Currently continued on PTA Zyprexa 5 mg at bedtime.   --(Previous atarax and Haldol discontinued recently).     --72 hour hold initiated per EMR in the ED (1/20/2020).      COPD with ongoing tobacco dependence:  Ongoing tobacco use 0.5 to 1 pack per day.    --Resume PTA Serevent inhaler BID.    --Nicorette gum available PRN.      HTN:  Does not appear to be on antihypertensive.    --Monitor.       HLP:  --Resume PTA atorvastatin 80 mg at bedtime and aspirin 81 mg daily.      Hepatitis C:  Per EMR in [facility] patient is s/p treatment.  Patient was able to confirm he completed treatment.  Previous LFT's stable.      Hypothyroidism:  --Resume PTA levothyroxine 88 mcg daily.      HFrEF:  2016 ECHO with EF of 50%.  Patient is not on diuretic therapy and follows with Cardiology and appears stable.  --No interventions.       DDD with L2 and L3 fractures (2007):  --PRN APAP available.      History of basal ganglia CVA:  --Resume PTA Aspirin 81 mg daily.      History of infective endocarditis of the mitral and aortic valve s/p bioprosthetic valve replacement (2015):   Follows with Cardiology and per EMR last Cardiology visit 1/14/2020 stable.      History of alcoholism currently in remission:  Per patient report minimal alcohol use, none recent.      History of substance abuse:  Patient denied history of substance abuse, per EMR patient had used cocaine and marijuana but none recently.    History of multiple TBI with chronic neurocognitive deficits:  --Monitor.      History of MRSA pneumonia and sepsis (2015):  --Completed course of IV antibiotics.        Diet: Regular Diet Adult     DVT Prophylaxis: Ambulate every shift   Valentin Catheter: not present  Code Status: Full Code     Disposition Plan    Expected discharge: Per Psych  Entered: Lefty Lacy PA-C 01/22/2020, 7:24 AM        The patient's care was discussed with the Patient.    The patient has been discussed with Dr. Yeager, who agrees with the assessment and plan at this time. Dr. Yeager will evaluate the patient independently.     At this time, I'd like to thank Dr. Batres for consulting the Hospitalist Service.  The patient does have extensive medical history but appears stable.  As such, the Hospitalist service will sign off.  Please reconsult if any questions or concerns arise.        Lefty Lacy PA-C  [facility]  Hospital    ______________________________________________________________________    Chief Complaint   Direct admit to inpatient psych on a 72 hour hold for psychosis.    History is obtained from the patient and EMR.      History of Present Illness   John Juárez is a 54 year old male with a past medical history significant for cognitive deficits, alcoholism in remission, COPD, HTN, HLP, hepatitis C, hypothyroidism, HFrEF, history of CVA involving basal ganglia, history of psychosis, history of TBI, history of subarachnoid hemorrhage, history of infective endocarditis of the mitral and aortic valve s/p replacement s/p replacement who was directly admitted to [facility] inpatient psych due to psychosis.    Notably patient was hospitalized at [facility] for psychosis with auditory hallucinations and placed on a 72 hour hold.  He was hospitalized for 5 days (1/2-1/7/2020).  Extensive work-up completed during that admission including head CT and labs that were all negative.  He was discharged back to his group home with a prescription of Haldol which was subsequently changed to Zyprexa.       Staff at patient's group home do not believe the patient has returned to baseline.  He continues to be responding to internal stimuli and has been more agitated with the other residents which has led to the police being called 3 times.  He has been more confused per staff report, as he was attempting to go into another resident's room and other times appear catatonic.  He has tried to smoke in the house and also leave the group home without shoes on.      Patient was brought into the [facility] ED on 1/20/2020 via EMS when staffed called 911.  Dr. Karimi evaluated the patient and urine tox screen and ethanol level were negative.  CMP was unremarkable.  TSH was WNL.  CBC with differential was unremarkable.  UA was unremarkable.  He was placed on a 72 hour hold and eventually transferred to [facility].      Patient  was seen in his hospital room.  He was seated at the edge of his bed.  Reviewed medical history which he denied some diagnosis that were noted per EMR.  Patient stated he was hungry otherwise offered no complaints.  He said he is hearing and seeing things.  Patient was observed responding to internal stimuli.      Review of Systems   The 10 point Review of Systems is negative other than noted in the HPI.      Past Medical History    I have reviewed this patient's medical history and updated it with pertinent information if needed.   Past Medical History:   Diagnosis Date     COPD (chronic obstructive pulmonary disease) (H)      Heart disease      Hypertension      Substance abuse (H)      TBI (traumatic brain injury) (H)    Chronic neurocognitive deficits  Alcoholism in remission  Substance abuse in remission  COPD  Tobacco Dependence  HTN  HLP  Hepatitis C  Hypothyroidism  HFrEF  History of CVA involving basal ganglia  History of psychosis  History of TBI  History of subarachnoid hemorrhage  History of infective endocarditis of the mitral and aortic valve s/p replacement s/p replacement  History of MRSA pneumonia with sepsis    Past Surgical History   I have reviewed this patient's surgical history and updated it with pertinent information if needed.  Past Surgical History:   Procedure Laterality Date     ABDOMEN SURGERY       APPENDECTOMY     Liver laceration s/p surgical repair 1982.    Bilateral ankle fracture surgical repair.    Mandibular fracture repair.    Appendectomy.    Aortic valve bioprosthetic replacement.    Mitral valve bioprosthetic replacement.    Sternal rewiring.    Social History   I have reviewed this patient's social history and updated it with pertinent information if needed.  Social History     Tobacco Use     Smoking status: Current Every Day Smoker     Packs/day: 0.50     Smokeless tobacco: Never Used   Substance Use Topics     Alcohol use: Not Currently     Drug use: Not Currently      Comment: rare   Patient resides in a group home at [facility].  He currently smokes 0.5 pack per day (1 pack might last 2-3 days).  He stated he does consume alcohol in small amounts and none recently.  He denied illicit drug use, but then said he had used marijuana a long time ago.      Family History   I have reviewed this patient's family history and updated it with pertinent information if needed.   No family history on file.   Father:  CAD with history of MI, passed early in his 50's.    Mother: DM2.    Medications   Prior to Admission Medications   Prescriptions Last Dose Informant Patient Reported? Taking?   Dextromethorphan-guaiFENesin  MG/5ML syrup  at prn  Yes Yes   Sig: Take 10 mLs by mouth every 4 hours as needed for cough   OLANZapine (ZYPREXA) 5 MG tablet 1/19/2020 at pm  Yes Yes   Sig: Take 5 mg by mouth At Bedtime   aspirin 81 MG EC tablet 1/20/2020 at am  Yes Yes   Sig: Take 81 mg by mouth daily   atorvastatin (LIPITOR) 80 MG tablet 1/19 at pm  Yes Yes   Sig: Take 80 mg by mouth At Bedtime   hydrOXYzine (ATARAX) 10 MG tablet 1/20/2020 at am  Yes Yes   Sig: Take 10 mg by mouth 3 times daily   hydrOXYzine (ATARAX) 50 MG tablet  at prn  Yes Yes   Sig: Take 50 mg by mouth 4 times daily as needed for anxiety    levothyroxine (SYNTHROID/LEVOTHROID) 88 MCG tablet 1/20/2020 at am  Yes Yes   Sig: Take 88 mcg by mouth daily   salmeterol (SEREVENT) 50 MCG/DOSE inhaler 1/20/2020 at am  Yes Yes   Sig: Inhale 1 puff into the lungs 2 times daily   vitamin A & D (BABY) external ointment  at prn  Yes Yes   Sig: Apply topically to dry, reddened or rash areas as needed   vitamin D3 (CHOLECALCIFEROL) 2000 units (50 mcg) tablet 1/20/2020 at am  Yes Yes   Sig: Take 1 tablet by mouth daily      Facility-Administered Medications: None       Allergies   Allergies   Allergen Reactions     Ibuprofen      Latex        Physical Exam   Vital signs:  Temp: 97.3  F (36.3  C) Temp src: Oral BP: 113/83 Pulse: 92    Resp: 16 SpO2: 100 % O2 Device: None (Room air)        There is no height or weight on file to calculate BMI.    Constitutional: Awake, alert, cooperative, no apparent distress.    ENT: Normocephalic, without obvious abnormality, atraumatic, oral pharynx with moist mucus membranes, tonsils without erythema or exudates.  Eyes pupils are equal, round and reactive to light; extra occular movements intact.  Normal sclera.    Neck: Supple, symmetrical, trachea midline, no adenopathy.  Pulmonary: No increased work of breathing, fair air exchange, clear to auscultation bilaterally, no crackles or wheezing.  Cardiovascular: Regular rate and rhythm, normal S1 and S2, no S3 or S4, and no murmur noted.  GI: Normal bowel sounds, soft, non-distended, non-tender.    Skin/Integumen: Clear.  Neuro: CN II-XII grossly intact.  Upper and lower extremities strength, coordination and sensation intact bilaterally.    Psych:  Alert and oriented x 2. Flat affect.  Responding to internal stimuli.    Extremities: No lower extremity edema noted, and calves are non-tender to palpation bilaterally. Clubbing noted of the fingernails.      Data   No results found for this or any previous visit (from the past 24 hour(s)).

## 2020-01-22 NOTE — PROGRESS NOTES
"  Initial Psychosocial Assessment    I have reviewed the chart, met with the patient, and developed Care Plan. Information for assessment was obtained from: Pt, medical record      Presenting Problem: Admitted to [address] due to increased psychosis and confusion.   Pt admits to \"hearing and seeing things.\"   Also, grandiose delusions - he says he has \"two mansions.\"         History of Mental Health and Chemical Dependency:  [facility]:  1-2-20 to 1-7-20.    Hx of substance abuse - cannabis, cocaine, alcohol.    Significant Life Events   (Illness, Abuse, Trauma, Death):  Past medical history significant for cognitive deficits, alcoholism in remission, COPD, HTN, HLP, hepatitis C, hypothyroidism, HFrEF, history of CVA involving basal ganglia, history of psychosis, history of TBI, history of subarachnoid hemorrhage, history of infective endocarditis of the mitral and aortic valve aortic s/p replacement.      He was hospitalized for 5 days (1/2-1/7/2020).  Extensive work-up completed during that admission including head CT and labs that were all negative.    TBI hx.    Many health issues - current/historical.    Living Situation:  Lives in [facility] in [place].  Has lived there x3 months.       Educational Background:        Financial Status: [employer] through [facility].  Social security    Legal Issues:  None known    Ethnic/Cultural Issues:     Spiritual Orientation: [employer]      Service History:   None    Social Functioning (organization, interests):  Likes to go do group outings from the [facility] home.    He is an active person    Current Treatment Providers are:  He was recently referred to  [facility] for medication mgt.  He has a TBI :  João Garcia at [phone].      Social Service Assessment/Plan:   -File petitions for MI commitment and Yanez, through [facility].  -Determine name of provider at [facility] and ensure next appt is " scheduled.  -Communicate with TBI  as needed  -Communicate with pt's group home staff  [phone]

## 2020-01-22 NOTE — PROVIDER NOTIFICATION
01/21/20 1900   Patient Belongings   Did you bring any home meds/supplements to the hospital?  No   Patient Belongings locker   Patient Belongings Put in Hospital Secure Location (Security or Locker, etc.) other (see comments)   Belongings Search Yes   Clothing Search Yes   Second Staff Azam and Trina Jeans  Sweatshirt with strings X 3  Shoes with strings  Hat  $38 + change  Lighter    Admission:  I am responsible for any personal items that are not sent to the safe or pharmacy. [facility] is not responsible for loss, theft or damage of any property in my possession.        Patient Signature: ___________________________________________      Date/Time:__________________________     Staff Signature: __________________________________      Date/Time:__________________________     [facility] Staff person, if patient is unable/unwilling to sign:      __________________________________________________________      Date/Time: __________________________        Discharge:  [facility] has returned all of my personal belongings:     Patient Signature: ________________________________________     Date/Time: ____________________________________     Staff Signature: ______________________________________     Date/Time:_____________________________________

## 2020-01-22 NOTE — H&P
"[facility]  Psychiatric History and Physical      Patient name: John Juárez   MRN: [id number]    Age: 54 year old    YOB: 1965    Identifying information:   The patient is a 54 year old  male who identifies as his own guardian and resides in a group home.    Chief complaint:  \" I've got my own mansion.  Why would I go to a group home?\"    History of present illness: The patient has a history of major depressive disorder, remote substance use disorders, and a traumatic brain injury stemming from a 12 foot fall nearly 20 years ago.  He presents to the emergency room at the request of his group home staff who noted concern for altered mental state with prominent psychosis.  He was described as experiencing auditory hallucinations and exhibiting grandiose delusions.  The symptoms have resulted in conflicts with others at the group home leading to a recent hospitalization at [facility] on January 2, 2020.  He was monitored during his 72-hour hold and treated with Haldol and eventually discharged back to his group home.  Records indicate that shortly after returning home, he was noted to continue exhibiting psychosis which led to continued conflicts with staff and other residents leading to his current hospitalization.  On examination today, the patient is quick to tell me that he is doing well and wants to be discharged from the hospital.  His plan is to return home to his mansion, stating that he owns a few mentions throughout the [facility].  He appeared confused at the mere mention of returning to a group home.  He would frequently converse with what appeared to be hallucinations during the examination.  On questioning the presence of hallucinations, he admitted to their presence however did not care to discuss details of their contents.  Continued examination prompted anger and irritability, indicating that the patient could not tolerate a more " detailed interview today.    Psychiatric Review of Systems:    -- Depressive episode: Denied symptoms  -- Kavitha:  denies symptoms  -- Psychosis: Endorsed auditory hallucinations, referenced grandiose delusions.  -- Anxiety: denies symptoms corresponding to CHRISTEN or panic disorder  -- PTSD: denies symptoms  -- OCD: denies  symptoms  -- Eating disorder: denies symptoms    Psychiatric History:    The patient has a history of unspecified mood disorder which seems to be stemming from a TBI that occurred in the spring of 2010.  Records also indicate a history of major depressive disorder and unspecified psychosis.  Prior inpatient hospitalizations noted, possibly beginning in 2017.  At that time he was transferred to [facility] while involuntary commitment was being pursued.  No prior suicide attempts.  No history of ECT.    Substance Use History:    Established substance use disorders which currently appear to be in remission.  His urine drug screen was negative on admission for both illicit substances and alcohol.  Prior drugs of choice have included alcohol which was thought to be related to his TBI in May 2010.  He has also heavily used crack cocaine in the past.  Records reference involuntary commitment for treatment of a substance use disorder in 2016.  History of treatment noted.    Medical History:  Defer to internal medicine consult; notable for hepatitis C, hypothyroidism, history of CVA, subarachnoid hemorrhage, and endocarditis.    [COMPLETED] OLANZapine zydis (zyPREXA) ODT tab 10 mg    aspirin 81 MG EC tablet, Take 81 mg by mouth daily  atorvastatin (LIPITOR) 80 MG tablet, Take 80 mg by mouth At Bedtime  Dextromethorphan-guaiFENesin  MG/5ML syrup, Take 10 mLs by mouth every 4 hours as needed for cough  hydrOXYzine (ATARAX) 10 MG tablet, Take 10 mg by mouth 3 times daily  hydrOXYzine (ATARAX) 50 MG tablet, Take 50 mg by mouth 4 times daily as needed for anxiety   levothyroxine (SYNTHROID/LEVOTHROID) 88  "MCG tablet, Take 88 mcg by mouth daily  OLANZapine (ZYPREXA) 5 MG tablet, Take 5 mg by mouth At Bedtime  salmeterol (SEREVENT) 50 MCG/DOSE inhaler, Inhale 1 puff into the lungs 2 times daily  vitamin A & D (BABY) external ointment, Apply topically to dry, reddened or rash areas as needed  vitamin D3 (CHOLECALCIFEROL) 2000 units (50 mcg) tablet, Take 1 tablet by mouth daily         Social History:  Refer to the psychosocial assessment completed by our .     Family History:    Not reported by the patient today.    Medical review of systems: 10 systems were reviewed and positive for psychiatric symptoms as noted above otherwise negative    Physical Exam:    B/P: 113/83, T: 97.3, P: 92, R: 16  There is no height or weight on file to calculate BMI.    The rest of the physical examination was reviewed in the emergency room note completed by the emergency room physician.      Mental status examination:  Appearance: Alert, sitting in a chair, conversing to himself.  Appeared disheveled and dressed in hospital scrubs.  Attitude: Mostly guarded  Eye Contact: Poor as he mostly looked around the room.  Mood: \"Just fine\"  Affect: Moderately irritable  Speech:  Normal rates, tone, latency, volume. Not pushed or pressured.  Psychomotor Behavior:  No psychomotor abnormalities noted  Thought Process: Mild disorganization  Associations:  no loose associations Noted  Thought Content: Noting his guarded demeanor, paranoia may be considered.  He discussed grandiose delusions.  He appeared to be responding to psychotic stimuli stemming from auditory hallucinations.  Denies suicidal Ideations. Denies homicidal ideations.  Insight: Limited  Judgment: Limited  Oriented to: Person, place, month and year.  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  The patient has been admitted to our behavioral health unit a 72-hour hold for psychosis which has been impairing his judgment and causing conflicts with others in his group home.  Without adequate treatment of psychosis, the patient is at risk of homelessness, further decompensation, and hostile behaviors towards himself or others.  He is not willing to sign in voluntarily and does not appear to have capacity to give consent for treatment at this time therefore a petition for commitment will be initiated.    2.  Zyprexa has been resumed and the dose will be increased to 10 mg at bedtime targeting psychosis and mood instability.    3. Psychosocial treatments to be addressed with social work consult and groups.

## 2020-01-22 NOTE — PLAN OF CARE
"54 year old male received from [facility] on a 72 hour hold due to acute psychosis with confusion and increased agitation. Pt. has a history of psychosis, TBI and alcoholism in remission. Living at [facility] in [place]. Decompensating today-increased agitation, confusion and auditory hallucinations. Periods of catatonia. Walking into other patients room. Attempted to light a cigarette in the house. Sent to the ER for evaluation. Decision made to admit Inpatient. Pt very preoccupied during admit interview. Responding verbally to internal stimuli. Cooperative with admit process but did have periods of time where he would stare blankly into space. Reports x1 previous suicide attempt \"years ago\" denies any recent suicidal ideation. Tells writer \" I don't know what happened today- I thought I was going home\" States he has \"two mansions\" but was going to stay temporarily with his sister. Ate well. Medication compliant.      Nursing assessment complete including patient and medication profiles. Risk assessments completed addressing suicide,fall,skin,nutrition and safety issues. Care plan initiated. Assessments reviewed with physician and admit orders received. Video monitoring in progress, Patient Informed. Welcome packet reviewed with patient. Information reviewed includes getting emergency help, preventing infections, understanding your care, using medication safely, reducing falls, preventing pressure ulcers, smoking cessation, powerful choices and Patients Bill of Rights. Pt. given tour of the unit and instruction on use of facility including emergency call light. Program schedule reviewed with patient. Questions regarding the unit addressed. Pt. Search completed and belongings inventoried.       "

## 2020-01-23 PROCEDURE — 25000132 ZZH RX MED GY IP 250 OP 250 PS 637: Performed by: PSYCHIATRY & NEUROLOGY

## 2020-01-23 PROCEDURE — 99231 SBSQ HOSP IP/OBS SF/LOW 25: CPT | Performed by: NURSE PRACTITIONER

## 2020-01-23 PROCEDURE — 12400000 ZZH R&B MH

## 2020-01-23 PROCEDURE — 25000132 ZZH RX MED GY IP 250 OP 250 PS 637: Performed by: PHYSICIAN ASSISTANT

## 2020-01-23 PROCEDURE — 99207 ZZC CDG-MDM COMPONENT: MEETS MODERATE - UP CODED: CPT | Performed by: PSYCHIATRY & NEUROLOGY

## 2020-01-23 PROCEDURE — 99233 SBSQ HOSP IP/OBS HIGH 50: CPT | Performed by: PSYCHIATRY & NEUROLOGY

## 2020-01-23 RX ORDER — HALOPERIDOL 5 MG/ML
5 INJECTION INTRAMUSCULAR EVERY 6 HOURS PRN
Status: DISCONTINUED | OUTPATIENT
Start: 2020-01-23 | End: 2020-02-11 | Stop reason: HOSPADM

## 2020-01-23 RX ORDER — OLANZAPINE 10 MG/1
10 TABLET ORAL 2 TIMES DAILY
Status: DISCONTINUED | OUTPATIENT
Start: 2020-01-23 | End: 2020-01-27

## 2020-01-23 RX ORDER — LORAZEPAM 2 MG/ML
1 INJECTION INTRAMUSCULAR 2 TIMES DAILY PRN
Status: DISCONTINUED | OUTPATIENT
Start: 2020-01-23 | End: 2020-02-11 | Stop reason: HOSPADM

## 2020-01-23 RX ORDER — HALOPERIDOL 5 MG/1
5 TABLET ORAL EVERY 6 HOURS PRN
Status: DISCONTINUED | OUTPATIENT
Start: 2020-01-23 | End: 2020-02-11 | Stop reason: HOSPADM

## 2020-01-23 RX ORDER — LORAZEPAM 1 MG/1
1 TABLET ORAL 2 TIMES DAILY PRN
Status: DISCONTINUED | OUTPATIENT
Start: 2020-01-23 | End: 2020-02-11 | Stop reason: HOSPADM

## 2020-01-23 RX ADMIN — OLANZAPINE 5 MG: 5 TABLET, ORALLY DISINTEGRATING ORAL at 08:22

## 2020-01-23 RX ADMIN — SALMETEROL XINAFOATE 1 PUFF: 50 POWDER, METERED ORAL; RESPIRATORY (INHALATION) at 21:33

## 2020-01-23 RX ADMIN — OLANZAPINE 10 MG: 10 TABLET, FILM COATED ORAL at 21:32

## 2020-01-23 RX ADMIN — ASPIRIN 81 MG: 81 TABLET, COATED ORAL at 08:22

## 2020-01-23 RX ADMIN — HALOPERIDOL 5 MG: 5 TABLET ORAL at 14:27

## 2020-01-23 RX ADMIN — LEVOTHYROXINE SODIUM 88 MCG: 88 TABLET ORAL at 06:21

## 2020-01-23 RX ADMIN — LORAZEPAM 1 MG: 1 TABLET ORAL at 14:27

## 2020-01-23 RX ADMIN — CHOLECALCIFEROL TAB 50 MCG (2000 UNIT) 2000 UNITS: 50 TAB at 08:22

## 2020-01-23 RX ADMIN — ACETAMINOPHEN 650 MG: 325 TABLET, FILM COATED ORAL at 12:26

## 2020-01-23 RX ADMIN — HYDROXYZINE HYDROCHLORIDE 50 MG: 50 TABLET, FILM COATED ORAL at 03:03

## 2020-01-23 RX ADMIN — ATORVASTATIN CALCIUM 80 MG: 40 TABLET, FILM COATED ORAL at 21:32

## 2020-01-23 RX ADMIN — SALMETEROL XINAFOATE 1 PUFF: 50 POWDER, METERED ORAL; RESPIRATORY (INHALATION) at 08:34

## 2020-01-23 RX ADMIN — OLANZAPINE 5 MG: 5 TABLET, ORALLY DISINTEGRATING ORAL at 03:03

## 2020-01-23 ASSESSMENT — ACTIVITIES OF DAILY LIVING (ADL)
LAUNDRY: UNABLE TO COMPLETE
HYGIENE/GROOMING: PROMPTS
ORAL_HYGIENE: PROMPTS
DRESS: SCRUBS (BEHAVIORAL HEALTH)

## 2020-01-23 NOTE — PROGRESS NOTES
"Peers heard a loud noise coming from John's room.  Patient was lying on floor next to the wall, according to him, he was standing next to the window , he tripped on his socks and fell.  He sat on his own, vital signs are stable, he is agitated and irritable, yelling  \" you  are stealing my money\" , tylenol by given by another nurse.  Denies any loss of consciousness, no abrasions or bleeding from head. Spoke with Hospitalist.  "
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
## 2020-01-23 NOTE — PLAN OF CARE
Pt was anxious and still experiencing auditory hallucination, confused, wondering were he was and how he got here. Pt thought process and judgment are impaired with no insight into situation. Pt thought today was Dec [birth year] th which is  his Birthday. Pt spent most of the shift in his room talking to himself and could be heard laughing to himself that irritated other patients. Other patients were redirected to their rooms to calm them down. Pt denies any SI and was medication compliant.

## 2020-01-23 NOTE — SIGNIFICANT EVENT
Received phone call from  . - Romi Mariscal that patient is now on [address] Hold with Izaiah Bond As of 1603 . 72 hour Hold discontinued .

## 2020-01-23 NOTE — PROGRESS NOTES
01/23/20 1300   General Information   Has Not Attended OT as of: 01/23/20     Pt has not attended OT since admit.  Will continue to encourage participation and completion of  self-assessment as able. OT staff will explain the purpose of being involved with treatment plan and provide options to meet current needs and goals.

## 2020-01-23 NOTE — PROGRESS NOTES
[facility]  Psychiatric Progress Note    Length of stay (days): 2        Interim History:   The patient's care was discussed with the treatment team during the daily team meeting and/or staff's chart notes were reviewed.  Staff report: Appears to be responding to psychotic stimuli, episodes of agitation do not seem to be responding to 5 mg of Zyprexa, slept well last night, eating his meals.  Earlier today, he became agitated and punched the window without injury.  Seen by the prepetition examiner this morning.    On examination today, the patient was in his room, standing next to his window, conversing to himself.  He briefly looked at my direction on introduction then returned to looking out the window.  He was mostly dismissive and passive throughout the interview.  His responses were conflicting, initially telling me that he is not doing well, then quickly changing his answer to tell me that he is just fine.    He endorsed auditory hallucinations and seem to be bothered by and distracted from the hallucinations.  He was not willing to discuss the content of the hallucinations.    He denied suicidal and homicidal thoughts.    No medication side effects reported.         Medications:       aspirin  81 mg Oral Daily     atorvastatin  80 mg Oral At Bedtime     levothyroxine  88 mcg Oral QAM AC     OLANZapine  10 mg Oral BID     salmeterol  1 puff Inhalation BID     vitamin D3  2,000 Units Oral Daily          Allergies:     Allergies   Allergen Reactions     Ibuprofen      Latex           Labs:   No results found for this or any previous visit (from the past 24 hour(s)).       Psychiatric Examination:     BP (!) 133/95   Pulse 90   Temp 97.6  F (36.4  C) (Oral)   Resp 16   SpO2 100%   Weight is 0 lbs 0 oz  There is no height or weight on file to calculate BMI.  Orthostatic Vitals       Most Recent      Lying Orthostatic /90 01/23 1351    Lying Orthostatic Pulse (bpm) 90 01/23 1351     "Sitting Orthostatic /91 01/23 1351    Sitting Orthostatic Pulse (bpm) 93 01/23 1351    Standing Orthostatic /76 01/23 1351    Standing Orthostatic Pulse (bpm) 95 01/23 1351            Appearance: awake, alert  Attitude:  guarded  Eye Contact:  poor   Mood:  \"I am fine\"  Affect:  guarded  Speech:  rambling  Psychomotor Behavior:  no evidence of tardive dyskinesia, dystonia, or tics  Throught Process:  disorganized  Associations:  no loose associations  Thought Content:  no evidence of suicidal ideation or homicidal ideation and auditory hallucinations present  Insight:  limited  Judgement:  limited  Oriented to:  time, person, and place  Attention Span and Concentration:  limited  Recent and Remote Memory:  fair    Clinical Global Impressions  First:  Considering your total clinical experience with this particular patient population, how severe are the patient's symptoms at this time?: 7 (01/22/20 1019)  Compared to the patient's condition at the START of treatment, this patient's condition is:: 4 (01/22/20 1019)  Most recent:  Considering your total clinical experience with this particular patient population, how severe are the patient's symptoms at this time?: 7 (01/22/20 1019)  Compared to the patient's condition at the START of treatment, this patient's condition is:: 4 (01/22/20 1019)         Precautions:     Behavioral Orders   Procedures     Assault precautions     Code 1 - Restrict to Unit     Fall precautions     Routine Programming     As clinically indicated     Status 15     Every 15 minutes.          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  Change PRN Zyprexa to as needed Haldol with the addition of Ativan as needed to address episodes of agitation.    Psychosocial treatments to be addressed with social work consult and groups.    Legal Status: 72 hour hold; petition for commitment has been submitted and the patient was seen by the prepetition examiner today.    Disposition: Back to his group home after displaying adequate reduction of psychosis

## 2020-01-23 NOTE — PROGRESS NOTES
"Neuro checks reassessed after fall, within normal limits, orthostatic blood pressures taken, see flow sheet.   Denies any headache, vision changes , nausea or vomiting.  Offered a call light, encourage to call for help , irritable and agitated, when I asked him if he had any symptoms  \" I am fine, I am fine, leave me alone.   Will continue to monitor status.  "

## 2020-01-23 NOTE — PROGRESS NOTES
"[facility]    Fall Note  1/23/2020   Time Called: 1217    Date of Admission:  1/21/2020  Code Status: Full Code    Summary:  I was called to evaluate John Juárez, a 54 year old male following a mechanical fall in his room. The patient is admitted for evaluation of altered mental status and psychosis. PMH includes TBI (fell 12 feet approx 20 years ago), MDD.  Patient reports he tripped on his socks (he is wearing the fall risk socks) but denied any preceding symptoms of dizziness, chest pain, dyspnea, vision changes. Per nursing, the patient was in his room looking out the window when he suffered an unwitnessed fall. Nursing staff was immediately in the room, obtained vitals and assisted the patient back to bed.      Assessment:  On my exam, the patient is alert, sitting on the edge of his bed looking out the window. He is pleasant on exam and tolerates my exam. His gait is at baseline, he is able to bear weight without issue. He reports hitting his head, but did not lose consciousness. He hit his head on a plastic piece, near the occipital region. On exam, there are no abrasions, lacerations, wounds, bruising or swelling. His speech is intact, he moves all extremities without pain. There are no other injuries noted, or areas of pain. His neck ROM is completed easily without pain. He notes some muscular pain near the top of his scapula on the left, however shortly after noting this - the pain is on his right side. There is no pain, step offs or abnormalities with palpation of his spine. He tells me he plans to go home today \"for sure\". He repeatedly states \"I'm fine\".     Temp: 97.6  F (36.4  C) Temp src: Oral BP: (!) 133/95 Pulse: 90   Resp: 16 SpO2: 100 % O2 Device: None (Room air)       Plan:  --neuro exams every 4 hours - paying close attention to new HA, vision changes, decreased mentation, confusion, nausea/vomiting, worsened gait instability  --orthostatic vitals     Not all injuries from " a fall are obvious on initial exam, please to not hesitate to call for reassessment by House provider should the patient's clinical status change, report new pain or symptoms.         MAIA [facility]  Hospitalist Service  House Officer  Pager: [phone] (3h - 6o)

## 2020-01-23 NOTE — PROGRESS NOTES
Legal:  Court hold as of today at 4:03pm, called in by Hallie at [facility] [address].

## 2020-01-23 NOTE — PROGRESS NOTES
Pt became agitated with other patients in [facility], began yelling, threatening, and charging at other patients. Two staff members stood in between and redirected patient back to room. All patients in [facility] returned to rooms and security was called for show of support. Pt continued to yell at staff and other patients from inside room. Will continue to monitor.

## 2020-01-24 PROCEDURE — 25000132 ZZH RX MED GY IP 250 OP 250 PS 637: Performed by: PSYCHIATRY & NEUROLOGY

## 2020-01-24 PROCEDURE — 12400000 ZZH R&B MH

## 2020-01-24 RX ADMIN — LEVOTHYROXINE SODIUM 88 MCG: 88 TABLET ORAL at 06:21

## 2020-01-24 RX ADMIN — ASPIRIN 81 MG: 81 TABLET, COATED ORAL at 07:50

## 2020-01-24 RX ADMIN — SALMETEROL XINAFOATE 1 PUFF: 50 POWDER, METERED ORAL; RESPIRATORY (INHALATION) at 07:50

## 2020-01-24 RX ADMIN — CHOLECALCIFEROL TAB 50 MCG (2000 UNIT) 2000 UNITS: 50 TAB at 07:50

## 2020-01-24 RX ADMIN — OLANZAPINE 10 MG: 10 TABLET, FILM COATED ORAL at 20:45

## 2020-01-24 RX ADMIN — SALMETEROL XINAFOATE 1 PUFF: 50 POWDER, METERED ORAL; RESPIRATORY (INHALATION) at 21:07

## 2020-01-24 RX ADMIN — ATORVASTATIN CALCIUM 80 MG: 40 TABLET, FILM COATED ORAL at 20:45

## 2020-01-24 RX ADMIN — OLANZAPINE 10 MG: 10 TABLET, FILM COATED ORAL at 07:50

## 2020-01-24 NOTE — PLAN OF CARE
Isolative, withdrawn, quiet. No irritability, agitation or aggression. Beginning of shift up in room, talking to self, appearing to respond to internal stimuli. No overt paranoia. Confused , some anxiety , rambling speech, and gesturing hands in front of himself. Mood fairly stable. Then settled down and went to bed . Rested and napped but out for supper . Thought process disorganized. Steady on feet. Pleasant and cooperative. Neuro checks unremarkable.. No insight. Denies SI.

## 2020-01-24 NOTE — PLAN OF CARE
Neuro checks unremarkable. Pt is stable on feet, denies pain. Thought is disorganized, rambling speech, confused. Wake in room.

## 2020-01-24 NOTE — PLAN OF CARE
Pt remains isolative and withdrawn to room, anxious. Blunt affect and poor insight and thought process. Observed patient with auditory/visual hallucination. Pt is however confused. Peers avoids him due to halluinations and delusional behavior. Noted patient with gesture hands and rambling speech to to self. Neuro done from fall that happened yesterday with no abnormality noticed. Pt was med compliant.

## 2020-01-24 NOTE — PROGRESS NOTES
Neuro check completed and within normal limits; see flow sheet. Patient denies pain , headache, vision changes, and nausea or vomiting. Patient is calm and was reoriented to place and situation. Will continue to monitor.
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
## 2020-01-25 PROCEDURE — 25000132 ZZH RX MED GY IP 250 OP 250 PS 637: Performed by: PSYCHIATRY & NEUROLOGY

## 2020-01-25 PROCEDURE — 12400000 ZZH R&B MH

## 2020-01-25 RX ADMIN — OLANZAPINE 10 MG: 10 TABLET, FILM COATED ORAL at 20:13

## 2020-01-25 RX ADMIN — ASPIRIN 81 MG: 81 TABLET, COATED ORAL at 08:00

## 2020-01-25 RX ADMIN — HALOPERIDOL 5 MG: 5 TABLET ORAL at 18:32

## 2020-01-25 RX ADMIN — CHOLECALCIFEROL TAB 50 MCG (2000 UNIT) 2000 UNITS: 50 TAB at 08:00

## 2020-01-25 RX ADMIN — SALMETEROL XINAFOATE 1 PUFF: 50 POWDER, METERED ORAL; RESPIRATORY (INHALATION) at 08:08

## 2020-01-25 RX ADMIN — LORAZEPAM 1 MG: 1 TABLET ORAL at 18:32

## 2020-01-25 RX ADMIN — ATORVASTATIN CALCIUM 80 MG: 40 TABLET, FILM COATED ORAL at 20:13

## 2020-01-25 RX ADMIN — SALMETEROL XINAFOATE 1 PUFF: 50 POWDER, METERED ORAL; RESPIRATORY (INHALATION) at 20:13

## 2020-01-25 RX ADMIN — LORAZEPAM 1 MG: 1 TABLET ORAL at 12:18

## 2020-01-25 RX ADMIN — LEVOTHYROXINE SODIUM 88 MCG: 88 TABLET ORAL at 08:00

## 2020-01-25 RX ADMIN — OLANZAPINE 10 MG: 10 TABLET, FILM COATED ORAL at 08:00

## 2020-01-25 ASSESSMENT — ACTIVITIES OF DAILY LIVING (ADL)
ORAL_HYGIENE: PROMPTS
HYGIENE/GROOMING: PROMPTS
DRESS: SCRUBS (BEHAVIORAL HEALTH)
LAUNDRY: UNABLE TO COMPLETE

## 2020-01-25 NOTE — PLAN OF CARE
" Pt remains anxious, confused and both audio and visual hallucination. Pt was observed talking ex wife doing something to his son to self and making gestures. Pt has no insight to situation nor thought process, Judgment is impaired. Spent most of the shift isolative to room except for few moments spent in the lounge Pt denies any SI and was med compliant. Pt keep saying \"get my medication list ready because my ride is on the way. I have exceeded my stay and need to go Home\" .    "

## 2020-01-26 PROCEDURE — 25000132 ZZH RX MED GY IP 250 OP 250 PS 637: Performed by: PSYCHIATRY & NEUROLOGY

## 2020-01-26 PROCEDURE — 12400000 ZZH R&B MH

## 2020-01-26 RX ADMIN — OLANZAPINE 10 MG: 10 TABLET, FILM COATED ORAL at 20:48

## 2020-01-26 RX ADMIN — OLANZAPINE 10 MG: 10 TABLET, FILM COATED ORAL at 08:39

## 2020-01-26 RX ADMIN — ATORVASTATIN CALCIUM 80 MG: 40 TABLET, FILM COATED ORAL at 20:49

## 2020-01-26 RX ADMIN — HALOPERIDOL 5 MG: 5 TABLET ORAL at 16:04

## 2020-01-26 RX ADMIN — CHOLECALCIFEROL TAB 50 MCG (2000 UNIT) 2000 UNITS: 50 TAB at 08:39

## 2020-01-26 RX ADMIN — SALMETEROL XINAFOATE 1 PUFF: 50 POWDER, METERED ORAL; RESPIRATORY (INHALATION) at 20:49

## 2020-01-26 RX ADMIN — LEVOTHYROXINE SODIUM 88 MCG: 88 TABLET ORAL at 08:39

## 2020-01-26 RX ADMIN — ASPIRIN 81 MG: 81 TABLET, COATED ORAL at 08:39

## 2020-01-26 RX ADMIN — HYDROXYZINE HYDROCHLORIDE 50 MG: 50 TABLET, FILM COATED ORAL at 16:04

## 2020-01-26 ASSESSMENT — ACTIVITIES OF DAILY LIVING (ADL)
DRESS: STREET CLOTHES
LAUNDRY: WITH SUPERVISION
LAUNDRY: WITH SUPERVISION
DRESS: STREET CLOTHES
ORAL_HYGIENE: INDEPENDENT
HYGIENE/GROOMING: INDEPENDENT
ORAL_HYGIENE: INDEPENDENT
HYGIENE/GROOMING: INDEPENDENT
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
## 2020-01-26 NOTE — PLAN OF CARE
Patient spent most of the shift in his room. Appears to be responding to internal stimuli, talking to himself. Also seen reacting to apparent visual hallucinations -  hitting/grabbing at things in the air. Patient is A&0x2. Disoriented to date - Stated it was the year 3000. Disoriented to place - stated we were in a clinic in [place]. Follows directions. Mood is anxious, tense. Affect is tense.

## 2020-01-27 PROCEDURE — 25000132 ZZH RX MED GY IP 250 OP 250 PS 637: Performed by: PSYCHIATRY & NEUROLOGY

## 2020-01-27 PROCEDURE — 99232 SBSQ HOSP IP/OBS MODERATE 35: CPT | Performed by: PSYCHIATRY & NEUROLOGY

## 2020-01-27 PROCEDURE — 12400000 ZZH R&B MH

## 2020-01-27 RX ORDER — OLANZAPINE 7.5 MG/1
15 TABLET, FILM COATED ORAL AT BEDTIME
Status: DISCONTINUED | OUTPATIENT
Start: 2020-01-27 | End: 2020-01-29

## 2020-01-27 RX ORDER — OLANZAPINE 10 MG/1
10 TABLET ORAL EVERY MORNING
Status: DISCONTINUED | OUTPATIENT
Start: 2020-01-28 | End: 2020-01-29

## 2020-01-27 RX ADMIN — LEVOTHYROXINE SODIUM 88 MCG: 88 TABLET ORAL at 06:25

## 2020-01-27 RX ADMIN — SALMETEROL XINAFOATE 1 PUFF: 50 POWDER, METERED ORAL; RESPIRATORY (INHALATION) at 20:40

## 2020-01-27 RX ADMIN — OLANZAPINE 10 MG: 10 TABLET, FILM COATED ORAL at 07:54

## 2020-01-27 RX ADMIN — CHOLECALCIFEROL TAB 50 MCG (2000 UNIT) 2000 UNITS: 50 TAB at 07:53

## 2020-01-27 RX ADMIN — OLANZAPINE 15 MG: 7.5 TABLET ORAL at 20:41

## 2020-01-27 RX ADMIN — ATORVASTATIN CALCIUM 80 MG: 40 TABLET, FILM COATED ORAL at 20:41

## 2020-01-27 RX ADMIN — HALOPERIDOL 5 MG: 5 TABLET ORAL at 06:25

## 2020-01-27 RX ADMIN — ASPIRIN 81 MG: 81 TABLET, COATED ORAL at 07:54

## 2020-01-27 ASSESSMENT — ACTIVITIES OF DAILY LIVING (ADL)
HYGIENE/GROOMING: INDEPENDENT
LAUNDRY: WITH SUPERVISION
LAUNDRY: WITH SUPERVISION
DRESS: INDEPENDENT
HYGIENE/GROOMING: INDEPENDENT
DRESS: STREET CLOTHES
ORAL_HYGIENE: INDEPENDENT
ORAL_HYGIENE: INDEPENDENT

## 2020-01-27 NOTE — PROGRESS NOTES
"[facility]  Psychiatric Progress Note    Length of stay (days): 6        Interim History:   The patient's care was discussed with the treatment team during the daily team meeting and/or staff's chart notes were reviewed.  Staff report: Continues to respond to psychotic stimuli.  Psychosis prompts slapping himself in the head or punching the walls out of frustration.  Taking medications.  No aggressive behavior toward staff or others reported.    The patient continues to endorse auditory hallucinations.  He interprets no significant change however seemed to be responding less to psychosis today.  He is more open to discussing the content today, explaining that he hears the voice of his sisters and other family members.    Mild feelings of paranoia reported without specific paranoid delusions.    He denied suicidal and homicidal thoughts.    No medication side effects reported.         Medications:       aspirin  81 mg Oral Daily     atorvastatin  80 mg Oral At Bedtime     levothyroxine  88 mcg Oral QAM AC     OLANZapine  10 mg Oral BID     salmeterol  1 puff Inhalation BID     vitamin D3  2,000 Units Oral Daily          Allergies:     Allergies   Allergen Reactions     Ibuprofen      Latex           Labs:   No results found for this or any previous visit (from the past 24 hour(s)).       Psychiatric Examination:     /78   Pulse 106   Temp 97.2  F (36.2  C) (Oral)   Resp 18   SpO2 100%   Weight is 0 lbs 0 oz  There is no height or weight on file to calculate BMI.  Orthostatic Vitals       Most Recent      Lying Orthostatic /90 01/23 1351    Lying Orthostatic Pulse (bpm) 90 01/23 1351    Sitting Orthostatic /91 01/23 1351    Sitting Orthostatic Pulse (bpm) 93 01/23 1351    Standing Orthostatic /76 01/23 1351    Standing Orthostatic Pulse (bpm) 95 01/23 1351            Appearance: awake, alert  Attitude:  guarded  Eye Contact:  poor   Mood:  \"I am fine\"  Affect:  " guarded  Speech:  rambling  Psychomotor Behavior:  no evidence of tardive dyskinesia, dystonia, or tics  Throught Process:  disorganized  Associations:  no loose associations  Thought Content:  no evidence of suicidal ideation or homicidal ideation and auditory hallucinations present  Insight:  limited  Judgement:  limited  Oriented to:  time, person, and place  Attention Span and Concentration:  limited  Recent and Remote Memory:  fair    Clinical Global Impressions  First:  Considering your total clinical experience with this particular patient population, how severe are the patient's symptoms at this time?: 7 (01/22/20 1019)  Compared to the patient's condition at the START of treatment, this patient's condition is:: 4 (01/22/20 1019)  Most recent:  Considering your total clinical experience with this particular patient population, how severe are the patient's symptoms at this time?: 7 (01/22/20 1019)  Compared to the patient's condition at the START of treatment, this patient's condition is:: 4 (01/22/20 1019)         Precautions:     Behavioral Orders   Procedures     Assault precautions     Code 1 - Restrict to Unit     Fall precautions     Routine Programming     As clinically indicated     Status 15     Every 15 minutes.          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  No medication side effects noted.    Psychosocial treatments to be addressed with social work consult and groups.    Legal Status: currently on a court hold with court examination set for tomorrow.    Disposition: Back to his group  home after displaying adequate reduction of psychosis
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
## 2020-01-27 NOTE — PLAN OF CARE
Problem: Adult Behavioral Health Plan of Care  Goal: Patient-Specific Goal (Individualization)  Description  1. Reality orientation.  2. Effective medication regime with patient compliance.  3. Stabilization of mood and thought processes.  4. Improved insight into mental health issues.  5. Able to identify and utilize positive coping skills.  6. Plan in place for ongoing treatment and support.     1/27/2020 1206 by Akin Juárez RN  Outcome: No Change  Note:   Pt has been fairly isolative to himself. Pt does not interact with peers or staff but does respond to internally stimuli. Staff asks who the pt is taking to and the pt states to my sister. Pt has also been observed in his room starring blankly at the wall and out the window. Pt denies any complaints. Pt has been posturing at times in the lounge making strange gestures. Pt was served court paperwork for commitment hearing. Pt's first hearing is 1/28/20 @ 1300. Nursing to continue to monitor.

## 2020-01-27 NOTE — PLAN OF CARE
"Patient pleasant and cooperative. Spent most of shift in lounge watching tv with staff and other patient. Multiple snacks today. Was talking to himself, when asked what he was saying stated, \"Oh I am just talking to someone\". Patient later was pacing and swearing under his breath, but 10 minutes later calmly watching TV. PRN haldol given. Patient compliant with medications.   "

## 2020-01-27 NOTE — PROGRESS NOTES
Preliminary Hearing January 28 at 1:00 PM.     Renetta [facility] Care Coordinator [phone] fax [phone].  I returned her call and left an update on patient.

## 2020-01-27 NOTE — PROGRESS NOTES
Aileen from [facility] [phone] called for an update, I called her back and spoke with her.

## 2020-01-27 NOTE — PLAN OF CARE
Pt became agitated this morning requesting his clothes so that he can leave because his ride was here. Pt was given Haldol PO.

## 2020-01-28 PROCEDURE — 25000132 ZZH RX MED GY IP 250 OP 250 PS 637: Performed by: PSYCHIATRY & NEUROLOGY

## 2020-01-28 PROCEDURE — 99232 SBSQ HOSP IP/OBS MODERATE 35: CPT | Performed by: PSYCHIATRY & NEUROLOGY

## 2020-01-28 PROCEDURE — 12400000 ZZH R&B MH

## 2020-01-28 RX ADMIN — CHOLECALCIFEROL TAB 50 MCG (2000 UNIT) 2000 UNITS: 50 TAB at 08:06

## 2020-01-28 RX ADMIN — LEVOTHYROXINE SODIUM 88 MCG: 88 TABLET ORAL at 08:06

## 2020-01-28 RX ADMIN — SALMETEROL XINAFOATE 1 PUFF: 50 POWDER, METERED ORAL; RESPIRATORY (INHALATION) at 08:07

## 2020-01-28 RX ADMIN — ASPIRIN 81 MG: 81 TABLET, COATED ORAL at 08:06

## 2020-01-28 RX ADMIN — OLANZAPINE 10 MG: 10 TABLET, FILM COATED ORAL at 08:06

## 2020-01-28 ASSESSMENT — ACTIVITIES OF DAILY LIVING (ADL)
ORAL_HYGIENE: INDEPENDENT
ORAL_HYGIENE: INDEPENDENT
HYGIENE/GROOMING: INDEPENDENT
HYGIENE/GROOMING: INDEPENDENT
LAUNDRY: WITH SUPERVISION
DRESS: INDEPENDENT
DRESS: INDEPENDENT

## 2020-01-28 NOTE — PROGRESS NOTES
"[facility]  Psychiatric Progress Note    Length of stay (days): 7        Interim History:   The patient's care was discussed with the treatment team during the daily team meeting and/or staff's chart notes were reviewed.  Staff report: No acute issues overnight.  Continues to respond to psychotic stimuli.  Taking medications.  No aggressive behavior toward staff or others reported.     The patient tells me that he is doing well and did not verbalize any complaints regarding mood or anxiety.  When questioned about hallucinations, he does continue to endorse the presence of auditory hallucinations as previously described (hearing the voice of his sisters and various other family members).      Mild feelings of paranoia reported without specific paranoid delusions.    He denied suicidal and homicidal thoughts.    No medication side effects reported.         Medications:       aspirin  81 mg Oral Daily     atorvastatin  80 mg Oral At Bedtime     levothyroxine  88 mcg Oral QAM AC     OLANZapine  10 mg Oral QAM     OLANZapine  15 mg Oral At Bedtime     salmeterol  1 puff Inhalation BID     vitamin D3  2,000 Units Oral Daily          Allergies:     Allergies   Allergen Reactions     Ibuprofen      Latex           Labs:   No results found for this or any previous visit (from the past 24 hour(s)).       Psychiatric Examination:     /77   Pulse (!) 9   Temp 97.4  F (36.3  C) (Oral)   Resp 16   SpO2 100%   Weight is 0 lbs 0 oz  There is no height or weight on file to calculate BMI.  Orthostatic Vitals       Most Recent      Lying Orthostatic /90 01/23 1351    Lying Orthostatic Pulse (bpm) 90 01/23 1351    Sitting Orthostatic /91 01/23 1351    Sitting Orthostatic Pulse (bpm) 93 01/23 1351    Standing Orthostatic /76 01/23 1351    Standing Orthostatic Pulse (bpm) 95 01/23 1351            Appearance: awake, alert  Attitude:  guarded  Eye Contact:  fair  Mood:  \"I am fine\"  Affect:  " guarded  Speech:  clear, coherent  Psychomotor Behavior:  no evidence of tardive dyskinesia, dystonia, or tics  Throught Process:  linear  Associations:  no loose associations  Thought Content:  no evidence of suicidal ideation or homicidal ideation and auditory hallucinations present  Insight:  limited  Judgement:  limited  Oriented to:  time, person, and place  Attention Span and Concentration:  limited  Recent and Remote Memory:  fair    Clinical Global Impressions  First:  Considering your total clinical experience with this particular patient population, how severe are the patient's symptoms at this time?: 7 (01/22/20 1019)  Compared to the patient's condition at the START of treatment, this patient's condition is:: 4 (01/22/20 1019)  Most recent:  Considering your total clinical experience with this particular patient population, how severe are the patient's symptoms at this time?: 7 (01/22/20 1019)  Compared to the patient's condition at the START of treatment, this patient's condition is:: 4 (01/22/20 1019)         Precautions:     Behavioral Orders   Procedures     Assault precautions     Code 1 - Restrict to Unit     Fall precautions     Routine Programming     As clinically indicated     Status 15     Every 15 minutes.          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  No medication side effects noted today.    Psychosocial treatments to be addressed with social work consult and groups.    Legal Status: Currently on a court hold and awaiting court examination    Disposition: Back to his group home after  displaying adequate reduction of psychosis

## 2020-01-28 NOTE — PLAN OF CARE
Problem: Psychotic Symptoms  Goal: Psychotic Symptoms  Description  Signs and symptoms of listed problems will be absent or manageable.  1/27/2020 2143 by Darren Thomas  Outcome: Improving  Flowsheets (Taken 1/27/2020 2143)  Psychotic Symptoms Assessed: all  Psychotic Symptoms Present: affect; mood; anxiety; insight; thought process  Note:   Pt presents with a blunt tense affect and a labile anxious mood. Pt spent the majority of the shift resting in his room, but spent some time in the lounge watching tv. Pt withdrawn but responds upon prompt. Pt was responding to internal stimuli and voices. Pt denied hearing voices but was shouting and conversing. Pt ate all of his food and was med compliant. Pt denies Si.

## 2020-01-28 NOTE — PLAN OF CARE
Pt has been up ad holley throughout the day shift. Presents a flat affect; can become irritable and tense but has kept it together. Pt is restless and will usually come in and out of his room throughout the day shift. No groups. Showered; well groomed and attire is appropriate to age and situation. Keeps to himself and if in the lounge is usually watching TV.

## 2020-01-29 PROCEDURE — 99207 ZZC CDG-MDM COMPONENT: MEETS MODERATE - UP CODED: CPT | Performed by: PSYCHIATRY & NEUROLOGY

## 2020-01-29 PROCEDURE — 25000132 ZZH RX MED GY IP 250 OP 250 PS 637: Performed by: PSYCHIATRY & NEUROLOGY

## 2020-01-29 PROCEDURE — 12400000 ZZH R&B MH

## 2020-01-29 PROCEDURE — 99233 SBSQ HOSP IP/OBS HIGH 50: CPT | Performed by: PSYCHIATRY & NEUROLOGY

## 2020-01-29 RX ORDER — BENZTROPINE MESYLATE 1 MG/1
1 TABLET ORAL 2 TIMES DAILY PRN
Status: DISCONTINUED | OUTPATIENT
Start: 2020-01-29 | End: 2020-02-11 | Stop reason: HOSPADM

## 2020-01-29 RX ORDER — OLANZAPINE 10 MG/1
10 TABLET, ORALLY DISINTEGRATING ORAL DAILY
Status: DISCONTINUED | OUTPATIENT
Start: 2020-01-30 | End: 2020-01-29

## 2020-01-29 RX ORDER — OLANZAPINE 15 MG/1
15 TABLET, ORALLY DISINTEGRATING ORAL AT BEDTIME
Status: DISCONTINUED | OUTPATIENT
Start: 2020-01-29 | End: 2020-01-29

## 2020-01-29 RX ORDER — HALOPERIDOL 5 MG/1
5 TABLET ORAL 3 TIMES DAILY
Status: DISCONTINUED | OUTPATIENT
Start: 2020-01-29 | End: 2020-02-11 | Stop reason: HOSPADM

## 2020-01-29 RX ADMIN — HALOPERIDOL 5 MG: 5 TABLET ORAL at 05:10

## 2020-01-29 RX ADMIN — SALMETEROL XINAFOATE 1 PUFF: 50 POWDER, METERED ORAL; RESPIRATORY (INHALATION) at 00:27

## 2020-01-29 RX ADMIN — OLANZAPINE 15 MG: 7.5 TABLET ORAL at 00:26

## 2020-01-29 RX ADMIN — CHOLECALCIFEROL TAB 50 MCG (2000 UNIT) 2000 UNITS: 50 TAB at 08:47

## 2020-01-29 RX ADMIN — SALMETEROL XINAFOATE 1 PUFF: 50 POWDER, METERED ORAL; RESPIRATORY (INHALATION) at 20:54

## 2020-01-29 RX ADMIN — LORAZEPAM 1 MG: 1 TABLET ORAL at 21:43

## 2020-01-29 RX ADMIN — HALOPERIDOL 5 MG: 5 TABLET ORAL at 13:27

## 2020-01-29 RX ADMIN — HALOPERIDOL 5 MG: 5 TABLET ORAL at 17:06

## 2020-01-29 RX ADMIN — SALMETEROL XINAFOATE 1 PUFF: 50 POWDER, METERED ORAL; RESPIRATORY (INHALATION) at 08:47

## 2020-01-29 RX ADMIN — ATORVASTATIN CALCIUM 80 MG: 40 TABLET, FILM COATED ORAL at 00:26

## 2020-01-29 RX ADMIN — OLANZAPINE 10 MG: 10 TABLET, FILM COATED ORAL at 08:47

## 2020-01-29 RX ADMIN — ASPIRIN 81 MG: 81 TABLET, COATED ORAL at 08:47

## 2020-01-29 RX ADMIN — LORAZEPAM 1 MG: 1 TABLET ORAL at 02:01

## 2020-01-29 RX ADMIN — ATORVASTATIN CALCIUM 80 MG: 40 TABLET, FILM COATED ORAL at 20:52

## 2020-01-29 RX ADMIN — HALOPERIDOL 5 MG: 5 TABLET ORAL at 20:52

## 2020-01-29 RX ADMIN — LEVOTHYROXINE SODIUM 88 MCG: 88 TABLET ORAL at 08:46

## 2020-01-29 ASSESSMENT — ACTIVITIES OF DAILY LIVING (ADL)
HYGIENE/GROOMING: INDEPENDENT
ORAL_HYGIENE: INDEPENDENT
HYGIENE/GROOMING: INDEPENDENT
LAUNDRY: WITH SUPERVISION
LAUNDRY: WITH SUPERVISION
ORAL_HYGIENE: INDEPENDENT
DRESS: INDEPENDENT
DRESS: INDEPENDENT

## 2020-01-29 NOTE — PLAN OF CARE
Pt was heard yelling very loudly in his room. Upon approach, pt was making hand gestures and talking to self, making incoherent statements. Pt was asked whether he would accept prn meds for agitation. While patient was sitting in lounge waiting, he was observed charging towards staff in anger while staff was trying to engage pt in therapeutic communication. Prn Ativan 1mg given at 0201. Haldol 5mg was given at 0510 for aggressiveness and agitation. Will continue to monitor for safety.

## 2020-01-29 NOTE — PLAN OF CARE
Problem: Psychotic Symptoms  Goal: Psychotic Symptoms  Description  Signs and symptoms of listed problems will be absent or manageable.  1/28/2020 2157 by Darren Thomas  Outcome: No Change  Flowsheets (Taken 1/28/2020 2157)  Psychotic Symptoms Assessed: all  Psychotic Symptoms Present: affect; mood; anxiety; insight; thought process  Note:   Pt presents with a tense affect and an irritable anxious mood. Pt spent most of the shift in the lounge watching tv when not resting in bed. Pt still responds to auditory hallucinations and internal stimuli, but has not had any outbursts this shift towards them. Pt is minimally social with peers and staff. Pt ate all of his food and was med compliant. Pt denies Si.

## 2020-01-29 NOTE — PROGRESS NOTES
here to  at 11:00 patient for Commitment hearing and pt refused to go and said he was being discharged today.

## 2020-01-29 NOTE — PLAN OF CARE
Problem: Psychotic Symptoms  Goal: Psychotic Symptoms  Description  Signs and symptoms of listed problems will be absent or manageable.  Outcome: No Change  Flowsheets (Taken 1/29/2020 1325)  Psychotic Symptoms Assessed: all  Psychotic Symptoms Present: anxiety; other (see comment); insight; affect; mood  Note:   Pt presents with a tense affect and irritable mood. Judgement impaired and insight poor. Pt continues to respond to auditory hallucinations - became agitated and aggressive towards staff this afternoon, Haldol was given. Speech is pressured, thought patterns disorganized. No indicators of SI, compliant with medications.

## 2020-01-30 PROCEDURE — 99232 SBSQ HOSP IP/OBS MODERATE 35: CPT | Performed by: PSYCHIATRY & NEUROLOGY

## 2020-01-30 PROCEDURE — 25000132 ZZH RX MED GY IP 250 OP 250 PS 637: Performed by: PSYCHIATRY & NEUROLOGY

## 2020-01-30 PROCEDURE — 12400000 ZZH R&B MH

## 2020-01-30 RX ADMIN — SALMETEROL XINAFOATE 1 PUFF: 50 POWDER, METERED ORAL; RESPIRATORY (INHALATION) at 08:37

## 2020-01-30 RX ADMIN — SALMETEROL XINAFOATE 1 PUFF: 50 POWDER, METERED ORAL; RESPIRATORY (INHALATION) at 21:04

## 2020-01-30 RX ADMIN — HALOPERIDOL 5 MG: 5 TABLET ORAL at 08:37

## 2020-01-30 RX ADMIN — CHOLECALCIFEROL TAB 50 MCG (2000 UNIT) 2000 UNITS: 50 TAB at 08:36

## 2020-01-30 RX ADMIN — LEVOTHYROXINE SODIUM 88 MCG: 88 TABLET ORAL at 08:35

## 2020-01-30 RX ADMIN — ATORVASTATIN CALCIUM 80 MG: 40 TABLET, FILM COATED ORAL at 21:04

## 2020-01-30 RX ADMIN — HYDROXYZINE HYDROCHLORIDE 50 MG: 50 TABLET, FILM COATED ORAL at 12:04

## 2020-01-30 RX ADMIN — ASPIRIN 81 MG: 81 TABLET, COATED ORAL at 08:36

## 2020-01-30 RX ADMIN — HALOPERIDOL 5 MG: 5 TABLET ORAL at 21:04

## 2020-01-30 RX ADMIN — HALOPERIDOL 5 MG: 5 TABLET ORAL at 15:59

## 2020-01-30 RX ADMIN — HALOPERIDOL 5 MG: 5 TABLET ORAL at 12:04

## 2020-01-30 ASSESSMENT — ACTIVITIES OF DAILY LIVING (ADL)
HYGIENE/GROOMING: INDEPENDENT
HYGIENE/GROOMING: INDEPENDENT
ORAL_HYGIENE: INDEPENDENT
DRESS: INDEPENDENT
LAUNDRY: UNABLE TO COMPLETE
ORAL_HYGIENE: PROMPTS

## 2020-01-30 NOTE — PLAN OF CARE
Problem: Psychotic Symptoms  Goal: Psychotic Symptoms  Description  Signs and symptoms of listed problems will be absent or manageable.  1/29/2020 2209 by Darren Thomas  Outcome: No Change  Flowsheets (Taken 1/29/2020 2209)  Psychotic Symptoms Assessed: all  Psychotic Symptoms Present: affect; mood; anxiety; insight; thought process; psychomotor activity; speech  Note:   Pt presents with a tense irritable affect and a labile anxious mood. Pt spent the majority of the shift watching tv or playing card games in the lounge. Pt still responding to internal stimuli and auditory hallucinations. Pt had few outbursts responding to the voices and at staff. Pt has confused delusional thought process. Pt's speech is pressured, tangential and rambles. Pt asked staff to get his clothes so he can go because a  is waiting for him at the front of the building. Pt ate all of his food and was med compliant. Pt denies Si.

## 2020-01-30 NOTE — PLAN OF CARE
Pt has poor insight ,though process impaired. This writer reminded Pt that he has court tomorrow and patient said that he is being picked up tonight by his sister to catch a flight. Pt states he has many houses he has to check on. PT would have outbursts where he would yell at his sister because she is telling him negative things. Haldol given prn with hydroxyzine at 1204 and this helped to calm him

## 2020-01-30 NOTE — PLAN OF CARE
Pt has been restless and isolative to his room throughout the day shift. Presents a flat, tense, irritable and labile affect; mood is calm. Comes out of his room spontaneously; seems to be responding to internal stimuli; auditory hallucinations. Keeps to himself but could easily become irritable. No groups. No shower; untidy.

## 2020-01-30 NOTE — PROGRESS NOTES
"[facility]  Psychiatric Progress Note    Length of stay (days): 9        Interim History:   The patient's care was discussed with the treatment team during the daily team meeting and/or staff's chart notes were reviewed.  Staff report: No acute issues overnight.  Noted to be guarded, irritable, however compliant with taking medications.     He remains guarded and dismissive during our meeting today.  He kept our meeting fairly brief and did not care to elaborate on symptoms today.  Auditory hallucinations persist.    Paranoid delusions persist along with a guarded demeanor.  He denied suicidal and homicidal thoughts.  No medication side effects reported.         Medications:       aspirin  81 mg Oral Daily     atorvastatin  80 mg Oral At Bedtime     haloperidol  5 mg Oral TID     levothyroxine  88 mcg Oral QAM AC     salmeterol  1 puff Inhalation BID     vitamin D3  2,000 Units Oral Daily          Allergies:     Allergies   Allergen Reactions     Ibuprofen      Latex           Labs:   No results found for this or any previous visit (from the past 24 hour(s)).       Psychiatric Examination:     /73   Pulse 96   Temp 97.3  F (36.3  C) (Oral)   Resp 17   SpO2 99%   Weight is 0 lbs 0 oz  There is no height or weight on file to calculate BMI.  Orthostatic Vitals       Most Recent      Lying Orthostatic /90 01/23 1351    Lying Orthostatic Pulse (bpm) 90 01/23 1351    Sitting Orthostatic /91 01/23 1351    Sitting Orthostatic Pulse (bpm) 93 01/23 1351    Standing Orthostatic /76 01/23 1351    Standing Orthostatic Pulse (bpm) 95 01/23 1351            Appearance: awake, alert  Attitude:  guarded  Eye Contact:  fair  Mood:  \"I am fine\"  Affect:  guarded and slightly irritable  Speech:  clear, coherent  Psychomotor Behavior:  no evidence of tardive dyskinesia, dystonia, or tics  Throught Process:  linear  Associations:  no loose associations  Thought Content:  no evidence of suicidal " ideation or homicidal ideation and auditory hallucinations present  Insight:  limited  Judgement:  limited  Oriented to:  time, person, and place  Attention Span and Concentration:  limited  Recent and Remote Memory:  fair    Clinical Global Impressions  First:  Considering your total clinical experience with this particular patient population, how severe are the patient's symptoms at this time?: 7 (01/22/20 1019)  Compared to the patient's condition at the START of treatment, this patient's condition is:: 4 (01/22/20 1019)  Most recent:  Considering your total clinical experience with this particular patient population, how severe are the patient's symptoms at this time?: 7 (01/22/20 1019)  Compared to the patient's condition at the START of treatment, this patient's condition is:: 4 (01/22/20 1019)         Precautions:     Behavioral Orders   Procedures     Assault precautions     Code 1 - Restrict to Unit     Fall precautions     Routine Programming     As clinically indicated     Status 15     Every 15 minutes.          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  Cogentin will be available if needed for EPS.    Psychosocial treatments to be addressed with social work consult and groups.    Legal Status: Currently on a court hold and awaiting court examination; court is scheduled for tomorrow.    Disposition: Back to his group home after displaying adequate reduction of psychosis

## 2020-01-31 PROCEDURE — 25000132 ZZH RX MED GY IP 250 OP 250 PS 637: Performed by: PSYCHIATRY & NEUROLOGY

## 2020-01-31 PROCEDURE — 12400000 ZZH R&B MH

## 2020-01-31 RX ADMIN — GUAIFENESIN AND DEXTROMETHORPHAN 10 ML: 100; 10 SYRUP ORAL at 17:22

## 2020-01-31 RX ADMIN — HALOPERIDOL 5 MG: 5 TABLET ORAL at 07:47

## 2020-01-31 RX ADMIN — SALMETEROL XINAFOATE 1 PUFF: 50 POWDER, METERED ORAL; RESPIRATORY (INHALATION) at 21:20

## 2020-01-31 RX ADMIN — SALMETEROL XINAFOATE 1 PUFF: 50 POWDER, METERED ORAL; RESPIRATORY (INHALATION) at 07:47

## 2020-01-31 RX ADMIN — HALOPERIDOL 5 MG: 5 TABLET ORAL at 16:02

## 2020-01-31 RX ADMIN — LORAZEPAM 1 MG: 1 TABLET ORAL at 03:34

## 2020-01-31 RX ADMIN — ASPIRIN 81 MG: 81 TABLET, COATED ORAL at 07:47

## 2020-01-31 RX ADMIN — HALOPERIDOL 5 MG: 5 TABLET ORAL at 21:17

## 2020-01-31 RX ADMIN — ATORVASTATIN CALCIUM 80 MG: 40 TABLET, FILM COATED ORAL at 21:17

## 2020-01-31 RX ADMIN — LEVOTHYROXINE SODIUM 88 MCG: 88 TABLET ORAL at 07:47

## 2020-01-31 RX ADMIN — CHOLECALCIFEROL TAB 50 MCG (2000 UNIT) 2000 UNITS: 50 TAB at 07:47

## 2020-01-31 ASSESSMENT — ACTIVITIES OF DAILY LIVING (ADL)
ORAL_HYGIENE: PROMPTS
ORAL_HYGIENE: INDEPENDENT
LAUNDRY: UNABLE TO COMPLETE
DRESS: INDEPENDENT
HYGIENE/GROOMING: INDEPENDENT
HYGIENE/GROOMING: INDEPENDENT
DRESS: INDEPENDENT

## 2020-01-31 NOTE — PROGRESS NOTES
"[facility] Psychiatric Progress Note      Interval History:   Pt seen, team meeting held with , nursing staff, OT, and PA's to review diagnosis and treatment plan. Patient calmly laying in bed, staring at ceiling. Limited engagement with me. When I inqured why he's here, he responds: \"I have no idea, they didn't tell me.\" Denies hallucinosis. Upon explanation of some of the observations and treatment goals, he passively accepted my words without any response other than \"okay.\" He doesn't endorse any complaints to me.     Review of systems:   10 point Review of Systems conducted by Ubaldo Ho DO, is negative, other than noted in the HPI     Medications:       aspirin  81 mg Oral Daily     atorvastatin  80 mg Oral At Bedtime     haloperidol  5 mg Oral TID     levothyroxine  88 mcg Oral QAM AC     salmeterol  1 puff Inhalation BID     vitamin D3  2,000 Units Oral Daily     acetaminophen, benztropine, guaiFENesin-dextromethorphan, haloperidol **OR** haloperidol lactate, hydrOXYzine, LORazepam **OR** LORazepam, nicotine    Mental Status Examination:   Appearance:  awake, alert  Eye Contact:  good  Speech:  clear, coherent  Language:Normal  Psychomotor Behavior:  no evidence of tardive dyskinesia, dystonia, or tics  Mood:  \"good\"  Affect:  Flat, restricted  Thought Process: paucity of thought  Thought Content:  no evidence of suicidal ideation or homicidal ideation; no observations of hallucinosis  Oriented to:  time, person, and place  Attention Span and Concentration:  limited  Recent and Remote Memory:  fair  Fund of Knowledge: appropriate  Insight:  limited  Judgment:  limited      Labs/Vitals:   No results found for this or any previous visit (from the past 24 hour(s)).  B/P: 111/70, T: 97.5, P: 97, R: 16      DIagnoses:   1. Unspecified schizophrenia spectrum disorder and other psychotic disorder (consider a primary psychotic illness versus a bipolar disorder)  2. Traumatic brain " injury  3. 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 History of major depressive disorder  5. Alcohol use disorder, in remission  6. Stimulant use disorder, in remission         Plan:   1. Continue Haldol 5 mg tid  2. Has final commitment hearing today (our team is pursuing full MI commitment and Yanez)  3. SW assisting with commitment/dispo  4. Continue psychiatric hospitalization      Attestation:  Patient has been seen and evaluated by me,  Ubaldo Ho, DO

## 2020-01-31 NOTE — PROGRESS NOTES
I called pt's placement - [facility] - this week and gave an update to their RN, Aileen.  I also spoke with [employer] Atty (Camacho BENNETT) yesterday and gave him our recommendations for the final commitment hearing today.   Full MI commit & Yanez is the recommdation.

## 2020-01-31 NOTE — PLAN OF CARE
Pt is anxious and is redirectable, continues to hear voices with hand gestures as if he is actually talking to someone (auditory hallucination). Pt has blunt affect, poor insight and impaired judgment. Pt went to court today and his commitment is continued with Yanez. Pt was med compliant. Pt did not know where he was and asked staff what is this place called.  Court paper work for commitment and Yanez in the file.

## 2020-02-01 PROCEDURE — 25000132 ZZH RX MED GY IP 250 OP 250 PS 637: Performed by: PSYCHIATRY & NEUROLOGY

## 2020-02-01 PROCEDURE — 12400000 ZZH R&B MH

## 2020-02-01 RX ADMIN — HALOPERIDOL 5 MG: 5 TABLET ORAL at 20:43

## 2020-02-01 RX ADMIN — ATORVASTATIN CALCIUM 80 MG: 40 TABLET, FILM COATED ORAL at 20:43

## 2020-02-01 RX ADMIN — SALMETEROL XINAFOATE 1 PUFF: 50 POWDER, METERED ORAL; RESPIRATORY (INHALATION) at 20:43

## 2020-02-01 RX ADMIN — HALOPERIDOL 5 MG: 5 TABLET ORAL at 16:17

## 2020-02-01 RX ADMIN — ASPIRIN 81 MG: 81 TABLET, COATED ORAL at 07:49

## 2020-02-01 RX ADMIN — SALMETEROL XINAFOATE 1 PUFF: 50 POWDER, METERED ORAL; RESPIRATORY (INHALATION) at 07:50

## 2020-02-01 RX ADMIN — CHOLECALCIFEROL TAB 50 MCG (2000 UNIT) 2000 UNITS: 50 TAB at 07:50

## 2020-02-01 RX ADMIN — HALOPERIDOL 5 MG: 5 TABLET ORAL at 07:50

## 2020-02-01 RX ADMIN — LEVOTHYROXINE SODIUM 88 MCG: 88 TABLET ORAL at 07:50

## 2020-02-01 ASSESSMENT — ACTIVITIES OF DAILY LIVING (ADL)
DRESS: INDEPENDENT
LAUNDRY: UNABLE TO COMPLETE
ORAL_HYGIENE: INDEPENDENT
HYGIENE/GROOMING: INDEPENDENT

## 2020-02-01 NOTE — PLAN OF CARE
Problem: Psychotic Symptoms  Goal: Psychotic Symptoms  Description  Signs and symptoms of listed problems will be absent or manageable.  Psychosis  hallucinations   1/31/2020 2302 by Linda Farooq RN  Outcome: No Change  Flowsheets (Taken 1/31/2020 2302)  Psychotic Symptoms Assessed: all  Psychotic Symptoms Present: affect; mood; thought process; insight  Note:   Patient visible this shift, irritable at times. Patient observed having a nice conversation on the phone. After hanging up the phone patient at times would go into his room and yell about someone trying to steal from his car. Patient repeated this cycle about 3 times this shift. At other times patient observed talking to self in room. When asked by staff he said he was talking to his mother. Patient is medication compliant. Ate his dinner, snacked, played solitaire. Took PRN cough syrup at start of shift. Allowed staff to complete orthostatic blood pressures. Denies suicidal ideation, contracts for safety.

## 2020-02-01 NOTE — PLAN OF CARE
Patient visible this shift, irritable at times.  At times patient observed talking to self in room. Patient is medication compliant. Ate his breakfast, snacked, watched TV.  Allowed staff to complete orthostatic blood pressures. Denies suicidal ideation, contracts for safety. Pt has blunt affect, poor insight and impaired judgment.   Court paper work for commitment and Yanez in the file.

## 2020-02-02 PROCEDURE — 25000132 ZZH RX MED GY IP 250 OP 250 PS 637: Performed by: PSYCHIATRY & NEUROLOGY

## 2020-02-02 PROCEDURE — 12400000 ZZH R&B MH

## 2020-02-02 RX ADMIN — ASPIRIN 81 MG: 81 TABLET, COATED ORAL at 08:00

## 2020-02-02 RX ADMIN — SALMETEROL XINAFOATE 1 PUFF: 50 POWDER, METERED ORAL; RESPIRATORY (INHALATION) at 20:34

## 2020-02-02 RX ADMIN — HALOPERIDOL 5 MG: 5 TABLET ORAL at 15:14

## 2020-02-02 RX ADMIN — LEVOTHYROXINE SODIUM 88 MCG: 88 TABLET ORAL at 08:01

## 2020-02-02 RX ADMIN — ATORVASTATIN CALCIUM 80 MG: 40 TABLET, FILM COATED ORAL at 20:29

## 2020-02-02 RX ADMIN — SALMETEROL XINAFOATE 1 PUFF: 50 POWDER, METERED ORAL; RESPIRATORY (INHALATION) at 08:06

## 2020-02-02 RX ADMIN — BENZTROPINE MESYLATE 1 MG: 1 TABLET ORAL at 15:14

## 2020-02-02 RX ADMIN — HYDROXYZINE HYDROCHLORIDE 50 MG: 50 TABLET, FILM COATED ORAL at 09:24

## 2020-02-02 RX ADMIN — HALOPERIDOL 5 MG: 5 TABLET ORAL at 08:00

## 2020-02-02 RX ADMIN — HALOPERIDOL 5 MG: 5 TABLET ORAL at 16:15

## 2020-02-02 RX ADMIN — CHOLECALCIFEROL TAB 50 MCG (2000 UNIT) 2000 UNITS: 50 TAB at 08:01

## 2020-02-02 RX ADMIN — HALOPERIDOL 5 MG: 5 TABLET ORAL at 20:28

## 2020-02-02 RX ADMIN — LORAZEPAM 1 MG: 1 TABLET ORAL at 20:29

## 2020-02-02 ASSESSMENT — ACTIVITIES OF DAILY LIVING (ADL)
HYGIENE/GROOMING: INDEPENDENT
DRESS: INDEPENDENT
ORAL_HYGIENE: INDEPENDENT
ORAL_HYGIENE: INDEPENDENT
DRESS: INDEPENDENT
HYGIENE/GROOMING: INDEPENDENT

## 2020-02-02 NOTE — PLAN OF CARE
"Mostly withdrawn, bed resting but sometimes in lounge watching TV. At times talking to self in room . Mood is stable, no irritability with flat affect. Pleasant, quiet , respectful and cooperative. Thought process and judgment impaired . Poor insight . Says he is \"okay \". Denies SI .  Med compliant.   "

## 2020-02-02 NOTE — PLAN OF CARE
"Patient pleasant and cooperative. Was hallucinating, stating he hears voices all the time and they are negative. Compliant with meds. Hydroxazine given per prn order. At one point patient asked for his clothes, saying \"my ride his here\". Patient re-oriented. Rested in room for part of the shift. Watched tv in lounge. Continues to ask staff when he will be leaving, stating his sister is coming to pick him up. Writer spoke to sister and she stated he called her saying he was being discharged. We have OCTAVIA so informed her he is not being discharged. Patient will not accept this information and continues to ask for his clothes so he can be discharged.   "

## 2020-02-03 PROCEDURE — 12400000 ZZH R&B MH

## 2020-02-03 PROCEDURE — 25000132 ZZH RX MED GY IP 250 OP 250 PS 637: Performed by: PSYCHIATRY & NEUROLOGY

## 2020-02-03 PROCEDURE — 99232 SBSQ HOSP IP/OBS MODERATE 35: CPT | Performed by: PSYCHIATRY & NEUROLOGY

## 2020-02-03 RX ADMIN — HALOPERIDOL 5 MG: 5 TABLET ORAL at 08:07

## 2020-02-03 RX ADMIN — HALOPERIDOL 5 MG: 5 TABLET ORAL at 20:05

## 2020-02-03 RX ADMIN — HALOPERIDOL 5 MG: 5 TABLET ORAL at 16:04

## 2020-02-03 RX ADMIN — CHOLECALCIFEROL TAB 50 MCG (2000 UNIT) 2000 UNITS: 50 TAB at 08:08

## 2020-02-03 RX ADMIN — LEVOTHYROXINE SODIUM 88 MCG: 88 TABLET ORAL at 08:07

## 2020-02-03 RX ADMIN — SALMETEROL XINAFOATE 1 PUFF: 50 POWDER, METERED ORAL; RESPIRATORY (INHALATION) at 08:12

## 2020-02-03 RX ADMIN — ATORVASTATIN CALCIUM 80 MG: 40 TABLET, FILM COATED ORAL at 20:05

## 2020-02-03 RX ADMIN — SALMETEROL XINAFOATE 1 PUFF: 50 POWDER, METERED ORAL; RESPIRATORY (INHALATION) at 20:09

## 2020-02-03 RX ADMIN — ASPIRIN 81 MG: 81 TABLET, COATED ORAL at 08:08

## 2020-02-03 ASSESSMENT — ACTIVITIES OF DAILY LIVING (ADL)
DRESS: INDEPENDENT
ORAL_HYGIENE: INDEPENDENT
HYGIENE/GROOMING: INDEPENDENT
LAUNDRY: UNABLE TO COMPLETE

## 2020-02-03 NOTE — PLAN OF CARE
BEHAVIORAL TEAM DISCUSSION    Participants: MD, RN, CM, OT  Progress: lacks insight into situation, continues to say his ride is coming to pick him up   Anticipated length of stay:unknown  Continued Stay Criteria/Rationale: Committed as Mentally Ill, Yanez Order for neuroleptics, medication management  Medical/Physical: NA  Precautions:   Behavioral Orders   Procedures    Assault precautions    Code 1 - Restrict to Unit    Fall precautions    Routine Programming     As clinically indicated    Status 15     Every 15 minutes.     Plan: medication management, group home staff to visit to assess if patient is near baseline  Rationale for change in precautions or plan: NA

## 2020-02-03 NOTE — PLAN OF CARE
Pt is calm with flat affect and isolative to room. Bed resting/napping for most of the shift. Pt insight is poor and impaired judgment. Pt has paper work from court for his final commitment and [place] through [facility] with neuroleptics. Aileen from [facility], Pt previous Group Home was called to see if anyone from facility could come and assess pt to see if he is at his baseline now. They would be letting us know when and who was going to come out. Pt is med compliant

## 2020-02-03 NOTE — PLAN OF CARE
"Mood stable with flat, tense , affect. Thought process impaired with poor judgement. Talking to self at times. Stated several times \" I was to leave here today \". Appeared frustrated and irritated when explained this was not the case. Internally preoccupied . Out to watch a little TV but mostly bed resting , quiet and withdrawn. Denies SI. Insight poor . Med compliant . Respectful and polite.   "

## 2020-02-03 NOTE — PROGRESS NOTES
"[facility]  Psychiatric Progress Note    Length of stay (days): 13        Interim History:   The patient's care was discussed with the treatment team during the daily team meeting and/or staff's chart notes were reviewed.  Staff report: No acute issues overnight.  Noted to be guarded, irritable, however compliant with taking medications.     He kept our meeting short again and was mostly dismissive however he did pause today and asked how I was doing.  He made better eye contact today.    He did not endorse any paranoid ideations today.  Demeanor remains guarded however slightly less intense.  He denied suicidal and homicidal thoughts.  No medication side effects reported.         Medications:       aspirin  81 mg Oral Daily     atorvastatin  80 mg Oral At Bedtime     haloperidol  5 mg Oral TID     levothyroxine  88 mcg Oral QAM AC     salmeterol  1 puff Inhalation BID     vitamin D3  2,000 Units Oral Daily          Allergies:     Allergies   Allergen Reactions     Ibuprofen      Latex           Labs:   No results found for this or any previous visit (from the past 24 hour(s)).       Psychiatric Examination:     /75 (BP Location: Right arm)   Pulse 118   Temp 97.5  F (36.4  C) (Oral)   Resp 16   SpO2 99%   Weight is 0 lbs 0 oz  There is no height or weight on file to calculate BMI.  Orthostatic Vitals       Most Recent      Lying Orthostatic /90 01/23 1351    Lying Orthostatic Pulse (bpm) 90 01/23 1351    Sitting Orthostatic /91 01/23 1351    Sitting Orthostatic Pulse (bpm) 93 01/23 1351    Standing Orthostatic /76 01/23 1351    Standing Orthostatic Pulse (bpm) 95 01/23 1351            Appearance: awake, alert  Attitude:  guarded  Eye Contact:  fair  Mood:  \"I am fine\"  Affect:  guarded and slightly irritable  Speech:  clear, coherent  Psychomotor Behavior:  no evidence of tardive dyskinesia, dystonia, or tics  Throught Process:  linear  Associations:  no loose " associations  Thought Content:  no evidence of suicidal ideation or homicidal ideation and auditory hallucinations present  Insight:  limited  Judgement:  limited  Oriented to:  time, person, and place  Attention Span and Concentration:  limited  Recent and Remote Memory:  fair    Clinical Global Impressions  First:  Considering your total clinical experience with this particular patient population, how severe are the patient's symptoms at this time?: 7 (01/22/20 1019)  Compared to the patient's condition at the START of treatment, this patient's condition is:: 4 (01/22/20 1019)  Most recent:  Considering your total clinical experience with this particular patient population, how severe are the patient's symptoms at this time?: 7 (01/22/20 1019)  Compared to the patient's condition at the START of treatment, this patient's condition is:: 4 (01/22/20 1019)         Precautions:     Behavioral Orders   Procedures     Assault precautions     Code 1 - Restrict to Unit     Fall precautions     Routine Programming     As clinically indicated     Status 15     Every 15 minutes.          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  Cogentin will be available if needed for EPS.    Psychosocial treatments to be addressed with social work consult and groups.    Legal Status: Now under involuntary commitment for treatment of mental illness with [facility]anez order.    Disposition: Back to his group home after displaying adequate reduction of psychosis.  Noting no aggressive outbursts over the past few days, we will ask his group home staff  to assess to determine if residual symptomology can be managed from the group home setting.

## 2020-02-03 NOTE — PROGRESS NOTES
Received order from [facility] that patient is committed as Mentally Ill to [facility] and the Commissioner of Human Services.  There is also a Yanez Order authorizing the use of the following neuroleptic medications, Zyprexa, Haldol, Risperdal and/or Invega.  Order placed on the chart. Commitment terminates July 31, 2020.    Spoke with Aileen [facility], [phone].  Gave her an update and asked with someone from the group home could visit patient this week to assess if patient is near baseline.  She stated she would get back to us on who would be coming and when.      Aileen called back someone from [facility] will be out Tuesday from 10:00 AM to 11:00 AM to assess patient.

## 2020-02-04 PROCEDURE — 99232 SBSQ HOSP IP/OBS MODERATE 35: CPT | Performed by: PSYCHIATRY & NEUROLOGY

## 2020-02-04 PROCEDURE — 25000132 ZZH RX MED GY IP 250 OP 250 PS 637: Performed by: PSYCHIATRY & NEUROLOGY

## 2020-02-04 PROCEDURE — 12400000 ZZH R&B MH

## 2020-02-04 RX ADMIN — ATORVASTATIN CALCIUM 80 MG: 40 TABLET, FILM COATED ORAL at 20:33

## 2020-02-04 RX ADMIN — SALMETEROL XINAFOATE 1 PUFF: 50 POWDER, METERED ORAL; RESPIRATORY (INHALATION) at 20:38

## 2020-02-04 RX ADMIN — HALOPERIDOL 5 MG: 5 TABLET ORAL at 20:35

## 2020-02-04 RX ADMIN — LEVOTHYROXINE SODIUM 88 MCG: 88 TABLET ORAL at 08:53

## 2020-02-04 RX ADMIN — ASPIRIN 81 MG: 81 TABLET, COATED ORAL at 08:52

## 2020-02-04 RX ADMIN — HALOPERIDOL 5 MG: 5 TABLET ORAL at 08:53

## 2020-02-04 RX ADMIN — HALOPERIDOL 5 MG: 5 TABLET ORAL at 16:24

## 2020-02-04 RX ADMIN — CHOLECALCIFEROL TAB 50 MCG (2000 UNIT) 2000 UNITS: 50 TAB at 08:52

## 2020-02-04 RX ADMIN — SALMETEROL XINAFOATE 1 PUFF: 50 POWDER, METERED ORAL; RESPIRATORY (INHALATION) at 08:53

## 2020-02-04 ASSESSMENT — ACTIVITIES OF DAILY LIVING (ADL)
ORAL_HYGIENE: INDEPENDENT
DRESS: INDEPENDENT
HYGIENE/GROOMING: INDEPENDENT
LAUNDRY: WITH SUPERVISION

## 2020-02-04 NOTE — PLAN OF CARE
Problem: Psychotic Symptoms  Goal: Psychotic Symptoms  Description  Signs and symptoms of listed problems will be absent or manageable.  Psychosis  hallucinations   2/3/2020 2156 by Doris Kramer RN  Flowsheets (Taken 2/3/2020 2156)  Psychotic Symptoms Assessed: thought process; mood; affect; orientation  Psychotic Symptoms Present: affect; thought process; mood; orientation  Note:   Pt was visible in the milieu for most of the evening. Med compliant. Denied all psychosis. Was heard talking to himself and yelling. Was cooperative and redirectable.

## 2020-02-04 NOTE — PROGRESS NOTES
"[facility]  Psychiatric Progress Note    Length of stay (days): 14        Interim History:   The patient's care was discussed with the treatment team during the daily team meeting and/or staff's chart notes were reviewed.  Staff report: No acute issues overnight.  Guarded however less irritable.  Taking medications.  Sleeping and eating adequately.     He remains guarded and does not elaborate regarding mental health symptoms with the exception of endorsing auditory hallucinations.  He is able to describe decrease in intensity.  He did not care to discuss the content today.  He had no complaints regarding depressed mood or anxiety.    He denied suicidal and homicidal thoughts.  No medication side effects reported.         Medications:       aspirin  81 mg Oral Daily     atorvastatin  80 mg Oral At Bedtime     haloperidol  5 mg Oral TID     levothyroxine  88 mcg Oral QAM AC     salmeterol  1 puff Inhalation BID     vitamin D3  2,000 Units Oral Daily          Allergies:     Allergies   Allergen Reactions     Ibuprofen      Latex           Labs:   No results found for this or any previous visit (from the past 24 hour(s)).       Psychiatric Examination:     /75 (BP Location: Right arm)   Pulse 118   Temp 97.3  F (36.3  C) (Oral)   Resp 16   Wt 67.6 kg (149 lb)   SpO2 98%   Weight is 149 lbs 0 oz  There is no height or weight on file to calculate BMI.  Orthostatic Vitals       Most Recent      Lying Orthostatic /90 01/23 1351    Lying Orthostatic Pulse (bpm) 90 01/23 1351    Sitting Orthostatic /91 01/23 1351    Sitting Orthostatic Pulse (bpm) 93 01/23 1351    Standing Orthostatic /76 01/23 1351    Standing Orthostatic Pulse (bpm) 95 01/23 1351            Appearance: awake, alert  Attitude:  guarded  Eye Contact:  fair  Mood:  \"I am fine\"  Affect:  intensity is flat  Speech:  Minimal in content  Psychomotor Behavior:  no evidence of tardive dyskinesia, dystonia, or " tics  Throught Process:  linear  Associations:  no loose associations  Thought Content:  no evidence of suicidal ideation or homicidal ideation and auditory hallucinations present; he did not appear to be responding to overt psychosis.  Insight:  limited  Judgement:  limited  Oriented to:  time, person, and place  Attention Span and Concentration:  limited  Recent and Remote Memory:  fair    Clinical Global Impressions  First:  Considering your total clinical experience with this particular patient population, how severe are the patient's symptoms at this time?: 7 (01/22/20 1019)  Compared to the patient's condition at the START of treatment, this patient's condition is:: 4 (01/22/20 1019)  Most recent:  Considering your total clinical experience with this particular patient population, how severe are the patient's symptoms at this time?: 7 (01/22/20 1019)  Compared to the patient's condition at the START of treatment, this patient's condition is:: 4 (01/22/20 1019)         Precautions:     Behavioral Orders   Procedures     Assault precautions     Code 1 - Restrict to Unit     Fall precautions     Routine Programming     As clinically indicated     Status 15     Every 15 minutes.          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  Cogentin will be available if needed for EPS.    Psychosocial treatments to be addressed with social work consult and groups.    Legal Status: Under involuntary commitment for treatment of mental illness with Yanez order.    Disposition: Back to his group home after displaying adequate  reduction of psychosis.  Noting no aggressive outbursts over the past few days, we will ask his group home staff to assess to determine if residual symptomology can be managed from the group home setting.  Group home staff was able to meet with the patient this morning.  We are awaiting their feedback.

## 2020-02-04 NOTE — PLAN OF CARE
Problem: Psychotic Symptoms  Goal: Psychotic Symptoms  Description  Signs and symptoms of listed problems will be absent or manageable.  Psychosis  hallucinations   Outcome: Improving  Flowsheets (Taken 2/4/2020 7391)  Psychotic Symptoms Assessed: all  Psychotic Symptoms Present: affect; insight; thought process; psychomotor activity  Note:   Pt presents with a blunt affect and calm mood. Pt spent the majority of the shift resting in his room. Pt was minimally social but warmer upon approach. Pt did not appear to be responding to auditory hallucinations verbally this shift. Pt did appear to have hand tremors at times although. Pt ate all of his food and was med compliant. Pt denies SI.

## 2020-02-05 PROCEDURE — 25000132 ZZH RX MED GY IP 250 OP 250 PS 637: Performed by: PSYCHIATRY & NEUROLOGY

## 2020-02-05 PROCEDURE — 12400000 ZZH R&B MH

## 2020-02-05 PROCEDURE — 99232 SBSQ HOSP IP/OBS MODERATE 35: CPT | Performed by: PSYCHIATRY & NEUROLOGY

## 2020-02-05 RX ORDER — BENZTROPINE MESYLATE 0.5 MG/1
0.5 TABLET ORAL DAILY
Status: DISCONTINUED | OUTPATIENT
Start: 2020-02-06 | End: 2020-02-07

## 2020-02-05 RX ADMIN — ASPIRIN 81 MG: 81 TABLET, COATED ORAL at 07:56

## 2020-02-05 RX ADMIN — HALOPERIDOL 5 MG: 5 TABLET ORAL at 20:35

## 2020-02-05 RX ADMIN — HALOPERIDOL 5 MG: 5 TABLET ORAL at 16:21

## 2020-02-05 RX ADMIN — ATORVASTATIN CALCIUM 80 MG: 40 TABLET, FILM COATED ORAL at 20:35

## 2020-02-05 RX ADMIN — CHOLECALCIFEROL TAB 50 MCG (2000 UNIT) 2000 UNITS: 50 TAB at 07:56

## 2020-02-05 RX ADMIN — SALMETEROL XINAFOATE 1 PUFF: 50 POWDER, METERED ORAL; RESPIRATORY (INHALATION) at 20:35

## 2020-02-05 RX ADMIN — HALOPERIDOL 5 MG: 5 TABLET ORAL at 07:56

## 2020-02-05 RX ADMIN — LEVOTHYROXINE SODIUM 88 MCG: 88 TABLET ORAL at 07:56

## 2020-02-05 ASSESSMENT — ACTIVITIES OF DAILY LIVING (ADL)
DRESS: INDEPENDENT
LAUNDRY: WITH SUPERVISION
LAUNDRY: UNABLE TO COMPLETE
DRESS: INDEPENDENT
HYGIENE/GROOMING: PROMPTS
ORAL_HYGIENE: PROMPTS
HYGIENE/GROOMING: PROMPTS
ORAL_HYGIENE: INDEPENDENT;PROMPTS

## 2020-02-05 NOTE — PROGRESS NOTES
"[facility]  Psychiatric Progress Note    Length of stay (days): 15        Interim History:   The patient's care was discussed with the treatment team during the daily team meeting and/or staff's chart notes were reviewed.  Staff report: No acute issues overnight.  Guarded however less irritable.  Taking medications.  Sleeping and eating adequately.     The patient had no complaints regarding mood or anxiety symptoms.  He does continue to endorse auditory hallucinations however states they are not bothersome to him.  He was able to identify some difficulty remembering recent events.  He was not able to compare the intensity of hallucinations to what was occurring at the time of admission.  He was not able to recall meeting with staff from the group home yesterday.    He denied suicidal and homicidal thoughts.  No medication side effects reported.         Medications:       aspirin  81 mg Oral Daily     atorvastatin  80 mg Oral At Bedtime     haloperidol  5 mg Oral TID     levothyroxine  88 mcg Oral QAM AC     salmeterol  1 puff Inhalation BID     vitamin D3  2,000 Units Oral Daily          Allergies:     Allergies   Allergen Reactions     Ibuprofen      Latex           Labs:   No results found for this or any previous visit (from the past 24 hour(s)).       Psychiatric Examination:     /75   Pulse 110   Temp 97.8  F (36.6  C) (Oral)   Resp 16   Wt 67.6 kg (149 lb)   SpO2 99%   Weight is 149 lbs 0 oz  There is no height or weight on file to calculate BMI.  Orthostatic Vitals       Most Recent      Lying Orthostatic /90 01/23 1351    Lying Orthostatic Pulse (bpm) 90 01/23 1351    Sitting Orthostatic /91 01/23 1351    Sitting Orthostatic Pulse (bpm) 93 01/23 1351    Standing Orthostatic /76 01/23 1351    Standing Orthostatic Pulse (bpm) 95 01/23 1351            Appearance: awake, alert  Attitude:  guarded  Eye Contact:  fair  Mood:  \"I am fine\"  Affect:  intensity is " flat  Speech:  Minimal in content  Psychomotor Behavior:  tremor observed   Throught Process:  linear  Associations:  no loose associations  Thought Content:  no evidence of suicidal ideation or homicidal ideation and auditory hallucinations present; he did not appear to be responding to overt psychosis.  Insight:  limited  Judgement:  limited  Oriented to:  time, person, and place  Attention Span and Concentration:  limited  Recent and Remote Memory:  fair    Clinical Global Impressions  First:  Considering your total clinical experience with this particular patient population, how severe are the patient's symptoms at this time?: 7 (01/22/20 1019)  Compared to the patient's condition at the START of treatment, this patient's condition is:: 4 (01/22/20 1019)  Most recent:  Considering your total clinical experience with this particular patient population, how severe are the patient's symptoms at this time?: 7 (01/22/20 1019)  Compared to the patient's condition at the START of treatment, this patient's condition is:: 4 (01/22/20 1019)         Precautions:     Behavioral Orders   Procedures     Assault precautions     Code 1 - Restrict to Unit     Fall precautions     Routine Programming     As clinically indicated     Status 15     Every 15 minutes.          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  Reduction of psychosis has been noted since initiating Haldol.  Noting the presence of a tremor which seems to be worse this week, I will schedule 0.5 mg of Cogentin in the morning to address EPS related to  Haldol.    Noting some cognitive impairments, the patient would benefit from an outpatient neurologic assessment.  We will attempt to coordinate a referral for the patient at the time of discharge.    Psychosocial treatments to be addressed with social work consult and groups.    Legal Status: Under involuntary commitment for treatment of mental illness with Yanez order.    Disposition: Back to his group home after displaying adequate reduction of psychosis.  Noting no aggressive outbursts over the past few days, we will ask his group home staff to assess to determine if residual symptomology can be managed from the group home setting.  Group home staff was able to meet with the patient yesterday.  We are awaiting their feedback.

## 2020-02-05 NOTE — PROGRESS NOTES
Aileen from the Group Home called today because she wanted to know what the plan for patient is.  She reported that Mario from the group home was here yesterday to evaluate the patient.  John seems to be improving, appears less psychotic and he is not yelling anymore.      She mentioned that patient has tremulous hands and she wanted to know if we were going to give patient medication to stop the shakiness.  I told her that was not the plan. She wanted to know if patient will be seeing by Neurology and I told her that  Suggested that will be better if patient has a Neurology consult as an Outpatient.   Aileen reported, that patient has already a Neurology appointment in two weeks.

## 2020-02-05 NOTE — PLAN OF CARE
"Shift Update: Pt mood is calm and affect is flat. Pt stated that he is having auditory hallucinations, and stated that the voices are \"good\". Pt was isolative and withdrawn during shift. Pt had cough and hand tremors during shift.   Pt ate meals, and showered during shift.  Thought process is poor and insight is impaired. Pt denies suicidal ideation.  "

## 2020-02-05 NOTE — PLAN OF CARE
Problem: Psychotic Symptoms  Goal: Psychotic Symptoms  Description  Signs and symptoms of listed problems will be absent or manageable.  Psychosis  hallucinations   Outcome: Improving  Flowsheets (Taken 2/5/2020 6786)  Psychotic Symptoms Assessed: all  Psychotic Symptoms Present: affect; insight; thought process; psychomotor activity  Note:   Pt presents with a blunt affect and calm mood. Pt spent the majority of the shift resting in bed. Pt did come out for meals or to watch tv at times, but minimally social unless making requests. Pt endorses voices, but does not have any verbal outbursts towards them. Pt still has a cough with hand tremors. Pt ate all of his food and was med compliant. Pt denies Si.

## 2020-02-06 PROCEDURE — 12400000 ZZH R&B MH

## 2020-02-06 PROCEDURE — 25000132 ZZH RX MED GY IP 250 OP 250 PS 637: Performed by: PSYCHIATRY & NEUROLOGY

## 2020-02-06 PROCEDURE — 99232 SBSQ HOSP IP/OBS MODERATE 35: CPT | Performed by: PSYCHIATRY & NEUROLOGY

## 2020-02-06 RX ADMIN — BENZTROPINE MESYLATE 0.5 MG: 0.5 TABLET ORAL at 08:51

## 2020-02-06 RX ADMIN — ASPIRIN 81 MG: 81 TABLET, COATED ORAL at 08:51

## 2020-02-06 RX ADMIN — LEVOTHYROXINE SODIUM 88 MCG: 88 TABLET ORAL at 06:44

## 2020-02-06 RX ADMIN — ATORVASTATIN CALCIUM 80 MG: 40 TABLET, FILM COATED ORAL at 20:16

## 2020-02-06 RX ADMIN — HALOPERIDOL 5 MG: 5 TABLET ORAL at 20:16

## 2020-02-06 RX ADMIN — SALMETEROL XINAFOATE 1 PUFF: 50 POWDER, METERED ORAL; RESPIRATORY (INHALATION) at 08:52

## 2020-02-06 RX ADMIN — HALOPERIDOL 5 MG: 5 TABLET ORAL at 16:06

## 2020-02-06 RX ADMIN — SALMETEROL XINAFOATE 1 PUFF: 50 POWDER, METERED ORAL; RESPIRATORY (INHALATION) at 20:16

## 2020-02-06 RX ADMIN — HALOPERIDOL 5 MG: 5 TABLET ORAL at 08:52

## 2020-02-06 RX ADMIN — CHOLECALCIFEROL TAB 50 MCG (2000 UNIT) 2000 UNITS: 50 TAB at 08:51

## 2020-02-06 ASSESSMENT — ACTIVITIES OF DAILY LIVING (ADL)
ORAL_HYGIENE: PROMPTS
ORAL_HYGIENE: PROMPTS
DRESS: INDEPENDENT
LAUNDRY: UNABLE TO COMPLETE
DRESS: INDEPENDENT
LAUNDRY: WITH SUPERVISION
HYGIENE/GROOMING: PROMPTS
HYGIENE/GROOMING: PROMPTS

## 2020-02-06 NOTE — PLAN OF CARE
Problem: Psychotic Symptoms  Goal: Psychotic Symptoms  Description  Signs and symptoms of listed problems will be absent or manageable.  Psychosis  hallucinations   Outcome: Improving  Flowsheets (Taken 2/6/2020 1411)  Psychotic Symptoms Assessed: all  Psychotic Symptoms Present: affect; mood; insight; thought process; speech  Note:   Pt presents with a blunt sad affect and depressed mood. Pt spent the majority of the shift in his room resting with some time in lounge watching tv. Pt broke down earlier in the shift crying because he said that he misses home and wants to leave. Staff helped redirect him and calm him down. Pt was cooperative and pleasant among interaction with staff. Pt still endorses voices but not as much as past and continues to have cough and hand tremors. Pt ate all of his food and was med compliant. Pt denies Si.

## 2020-02-06 NOTE — PLAN OF CARE
Out briefly at beginning of shift but mostly isolating in room and bed resting. Withdrawn and quiet. Mood stable with flat affect . No irritability . Insight poor and thought process impaired. Denies auditory hallucinations but looks internally preoccupied. Mild occasional cough  and hand tremor. Denies SI. Med compliant..

## 2020-02-06 NOTE — PROGRESS NOTES
"[facility]  Psychiatric Progress Note    Length of stay (days): 16        Interim History:   The patient's care was discussed with the treatment team during the daily team meeting and/or staff's chart notes were reviewed.  Staff report: No acute issues overnight.  Guarded however less irritable.  Taking medications.  Sleeping and eating adequately.     The patient had no complaints regarding mood or anxiety symptoms.    Auditory hallucinations continue to occur however the intensity is diminishing with less emotional distress.    He inquired regarding when he may return home.    He denied suicidal and homicidal thoughts.  No medication side effects reported.          Medications:       aspirin  81 mg Oral Daily     atorvastatin  80 mg Oral At Bedtime     benztropine  0.5 mg Oral Daily     haloperidol  5 mg Oral TID     levothyroxine  88 mcg Oral QAM AC     salmeterol  1 puff Inhalation BID     vitamin D3  2,000 Units Oral Daily          Allergies:     Allergies   Allergen Reactions     Ibuprofen      Latex           Labs:   No results found for this or any previous visit (from the past 24 hour(s)).       Psychiatric Examination:     /75   Pulse 110   Temp 98.4  F (36.9  C) (Oral)   Resp 17   Wt 67.6 kg (149 lb)   SpO2 98%   Weight is 149 lbs 0 oz  There is no height or weight on file to calculate BMI.  Orthostatic Vitals       Most Recent      Lying Orthostatic /90 01/23 1351    Lying Orthostatic Pulse (bpm) 90 01/23 1351    Sitting Orthostatic /91 01/23 1351    Sitting Orthostatic Pulse (bpm) 93 01/23 1351    Standing Orthostatic /76 01/23 1351    Standing Orthostatic Pulse (bpm) 95 01/23 1351            Appearance: awake, alert  Attitude:  guarded  Eye Contact:  fair  Mood:  \"I am fine\"  Affect:  intensity is flat  Speech:  Minimal in content  Psychomotor Behavior:  tremor observed   Throught Process:  linear  Associations:  no loose associations  Thought Content:  no " evidence of suicidal ideation or homicidal ideation and auditory hallucinations present; he did not appear to be responding to overt psychosis.  Insight:  limited  Judgement:  limited  Oriented to:  time, person, and place  Attention Span and Concentration:  limited  Recent and Remote Memory:  fair    Clinical Global Impressions  First:  Considering your total clinical experience with this particular patient population, how severe are the patient's symptoms at this time?: 7 (01/22/20 1019)  Compared to the patient's condition at the START of treatment, this patient's condition is:: 4 (01/22/20 1019)  Most recent:  Considering your total clinical experience with this particular patient population, how severe are the patient's symptoms at this time?: 7 (01/22/20 1019)  Compared to the patient's condition at the START of treatment, this patient's condition is:: 4 (01/22/20 1019)         Precautions:     Behavioral Orders   Procedures     Assault precautions     Code 1 - Restrict to Unit     Fall precautions     Routine Programming     As clinically indicated     Status 15     Every 15 minutes.          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  Reduction of psychosis has been noted since initiating Haldol.  Continue Cogentin for management of EPS.    Noting some cognitive impairments, the patient would benefit from an outpatient neurologic assessment.  Staff at his group home report having scheduled an outpatient appointment for him.    Psychosocial treatments to be addressed with social work consult and groups.    Legal  Status: Under involuntary commitment for treatment of mental illness with Yanez order.    Disposition: Group home staff have met with the patient and are comfortable managing residual symptoms from the group home.  The patient may be discharged back to his group home once they are able to accommodate his return.

## 2020-02-07 PROCEDURE — 25000132 ZZH RX MED GY IP 250 OP 250 PS 637: Performed by: PSYCHIATRY & NEUROLOGY

## 2020-02-07 PROCEDURE — 12400000 ZZH R&B MH

## 2020-02-07 RX ORDER — BENZTROPINE MESYLATE 0.5 MG/1
0.5 TABLET ORAL 2 TIMES DAILY
Status: DISCONTINUED | OUTPATIENT
Start: 2020-02-07 | End: 2020-02-11 | Stop reason: HOSPADM

## 2020-02-07 RX ADMIN — LEVOTHYROXINE SODIUM 88 MCG: 88 TABLET ORAL at 06:31

## 2020-02-07 RX ADMIN — HALOPERIDOL 5 MG: 5 TABLET ORAL at 16:28

## 2020-02-07 RX ADMIN — CHOLECALCIFEROL TAB 50 MCG (2000 UNIT) 2000 UNITS: 50 TAB at 09:03

## 2020-02-07 RX ADMIN — HALOPERIDOL 5 MG: 5 TABLET ORAL at 09:02

## 2020-02-07 RX ADMIN — BENZTROPINE MESYLATE 0.5 MG: 0.5 TABLET ORAL at 22:14

## 2020-02-07 RX ADMIN — BENZTROPINE MESYLATE 0.5 MG: 0.5 TABLET ORAL at 09:02

## 2020-02-07 RX ADMIN — SALMETEROL XINAFOATE 1 PUFF: 50 POWDER, METERED ORAL; RESPIRATORY (INHALATION) at 09:06

## 2020-02-07 RX ADMIN — SALMETEROL XINAFOATE 1 PUFF: 50 POWDER, METERED ORAL; RESPIRATORY (INHALATION) at 22:14

## 2020-02-07 RX ADMIN — HALOPERIDOL 5 MG: 5 TABLET ORAL at 22:13

## 2020-02-07 RX ADMIN — ATORVASTATIN CALCIUM 80 MG: 40 TABLET, FILM COATED ORAL at 22:13

## 2020-02-07 RX ADMIN — ASPIRIN 81 MG: 81 TABLET, COATED ORAL at 09:02

## 2020-02-07 ASSESSMENT — ACTIVITIES OF DAILY LIVING (ADL)
LAUNDRY: UNABLE TO COMPLETE
HYGIENE/GROOMING: PROMPTS
LAUNDRY: UNABLE TO COMPLETE
ORAL_HYGIENE: PROMPTS
DRESS: INDEPENDENT
DRESS: INDEPENDENT
HYGIENE/GROOMING: PROMPTS
ORAL_HYGIENE: PROMPTS

## 2020-02-07 NOTE — PLAN OF CARE
Patient denies SI. Denies hearing voices but appears responding. Moved to the stepdown unit and is acclimating well to the step down unit. He is calm and cooperative. Does not attend groups but has been spending most of the time in his room.  Possible discharge next week to the group home. Cogentin increased to bid for tremors.

## 2020-02-07 NOTE — PLAN OF CARE
Mood stable with flat affect . Withdrawn , isolative to room and bed resting . Internally preoccupied , insight poor and impaired thought process . Came out for supper and ate very slowly for an hour. Pleasant and cooperative . Denies SI.  Mild hand tremor and occasional cough.

## 2020-02-07 NOTE — PROGRESS NOTES
"[facility]  Psychiatric Progress Note    Length of stay (days): 17        Interim History:   The patient's care was discussed with the treatment team during the daily team meeting and/or staff's chart notes were reviewed.  I met with John on the ITC.  He told me he owns \"12 mentions\" stating \"they were given to me by Doris PEREZ\".  He has a coarse tremor of his right upper extremity.  He is disheveled, pleasant with me, responses are rather impoverished.  He denies hearing voices and started laughing inappropriately when I asked him that question.  He denies thoughts of wanting to hurt self or others.  He wonders when he can go home.           Medications:       aspirin  81 mg Oral Daily     atorvastatin  80 mg Oral At Bedtime     benztropine  0.5 mg Oral Daily     haloperidol  5 mg Oral TID     levothyroxine  88 mcg Oral QAM AC     salmeterol  1 puff Inhalation BID     vitamin D3  2,000 Units Oral Daily          Allergies:     Allergies   Allergen Reactions     Ibuprofen      Latex           Labs:   No results found for this or any previous visit (from the past 24 hour(s)).       Psychiatric Examination:     /75   Pulse 94   Temp 97.8  F (36.6  C) (Oral)   Resp 15   Wt 67.6 kg (149 lb)   SpO2 100%   Weight is 149 lbs 0 oz  There is no height or weight on file to calculate BMI.  Orthostatic Vitals       Most Recent      Lying Orthostatic /90 01/23 1351    Lying Orthostatic Pulse (bpm) 90 01/23 1351    Sitting Orthostatic /91 01/23 1351    Sitting Orthostatic Pulse (bpm) 93 01/23 1351    Standing Orthostatic /76 01/23 1351    Standing Orthostatic Pulse (bpm) 95 01/23 1351            Appearance: awake, alert  Attitude:  guarded  Eye Contact:  fair  Mood:  \"I am fine\"  Affect:  intensity is flat  Speech:  Minimal in content  Psychomotor Behavior:  tremor observed   Throught Process:  linear  Associations:  no loose associations  Thought Content:  no evidence of suicidal " "ideation or homicidal ideation and appears to have grandiose delusions about owning 12 mansions given to him by \"sergei E\"; he did not appear to be responding to voices, but started laughing inappropriately when I asked him about voices he might be hearing  Insight:  limited  Judgement:  limited  Oriented to:  time, person, and place  Attention Span and Concentration:  limited  Recent and Remote Memory:  fair    Clinical Global Impressions  First:  Considering your total clinical experience with this particular patient population, how severe are the patient's symptoms at this time?: 7 (01/22/20 1019)  Compared to the patient's condition at the START of treatment, this patient's condition is:: 4 (01/22/20 1019)  Most recent:  Considering your total clinical experience with this particular patient population, how severe are the patient's symptoms at this time?: 7 (01/22/20 1019)  Compared to the patient's condition at the START of treatment, this patient's condition is:: 4 (01/22/20 1019)         Precautions:     Behavioral Orders   Procedures     Assault precautions     Code 1 - Restrict to Unit     Fall precautions     Routine Programming     As clinically indicated     Status 15     Every 15 minutes.          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  Reduction of psychosis has been noted since initiating Haldol, grandiose delusions noted today  Will Continue Cogentin for management of EPS, will increase to 0.5mg BID as he has a coarse resting tremor    Noting some cognitive impairments, the patient would benefit from " an outpatient neurologic assessment.  Staff at his group home report having scheduled an outpatient appointment for him.    Psychosocial treatments to be addressed with social work consult and groups.    Legal Status: Under involuntary commitment for treatment of mental illness with Yanez order.    Disposition: Group home staff have met with the patient and are comfortable managing residual symptoms from the group home.  The patient may be discharged back to his group home once they are able to accommodate his return.     Sushil Wilks MD

## 2020-02-08 PROCEDURE — 25000132 ZZH RX MED GY IP 250 OP 250 PS 637: Performed by: PSYCHIATRY & NEUROLOGY

## 2020-02-08 PROCEDURE — 12400000 ZZH R&B MH

## 2020-02-08 RX ADMIN — SALMETEROL XINAFOATE 1 PUFF: 50 POWDER, METERED ORAL; RESPIRATORY (INHALATION) at 09:49

## 2020-02-08 RX ADMIN — HALOPERIDOL 5 MG: 5 TABLET ORAL at 21:36

## 2020-02-08 RX ADMIN — ASPIRIN 81 MG: 81 TABLET, COATED ORAL at 09:39

## 2020-02-08 RX ADMIN — HALOPERIDOL 5 MG: 5 TABLET ORAL at 09:38

## 2020-02-08 RX ADMIN — BENZTROPINE MESYLATE 0.5 MG: 0.5 TABLET ORAL at 09:38

## 2020-02-08 RX ADMIN — ATORVASTATIN CALCIUM 80 MG: 40 TABLET, FILM COATED ORAL at 21:36

## 2020-02-08 RX ADMIN — SALMETEROL XINAFOATE 1 PUFF: 50 POWDER, METERED ORAL; RESPIRATORY (INHALATION) at 21:40

## 2020-02-08 RX ADMIN — BENZTROPINE MESYLATE 0.5 MG: 0.5 TABLET ORAL at 21:36

## 2020-02-08 RX ADMIN — HALOPERIDOL 5 MG: 5 TABLET ORAL at 17:01

## 2020-02-08 RX ADMIN — CHOLECALCIFEROL TAB 50 MCG (2000 UNIT) 2000 UNITS: 50 TAB at 09:38

## 2020-02-08 RX ADMIN — LEVOTHYROXINE SODIUM 88 MCG: 88 TABLET ORAL at 09:39

## 2020-02-08 ASSESSMENT — ACTIVITIES OF DAILY LIVING (ADL)
LAUNDRY: UNABLE TO COMPLETE
HYGIENE/GROOMING: PROMPTS
DRESS: INDEPENDENT
HYGIENE/GROOMING: PROMPTS
DRESS: INDEPENDENT
ORAL_HYGIENE: PROMPTS
LAUNDRY: UNABLE TO COMPLETE
ORAL_HYGIENE: PROMPTS

## 2020-02-08 NOTE — PROGRESS NOTES
Patient woke up around 0130 and requested a snack. After eating in the hallway, patient appeared confused as he made his way back to his room. Stopped in front of 729, waited a moment and proceeded to enter. This writer ran down the hallway to intervene. He was found standing in front of the bathroom, starring in the direction of the patient who currently occupies 729. This writer informed him that he was in the wrong room and that his room is at the end of the cruz with his name on it. Staff redirected patient to his room where he slept the rest of the night.

## 2020-02-08 NOTE — PLAN OF CARE
"Pt spent the majority of the shift in his room bed resting. Pt up to sit in the lounge and watch tv. Pt is disoriented to place, asking staff, \"Where am I?\" Pt did not attend any groups, was not social with peers. He appears disheveled in appearance, has a flat affect and is withdrawn.     "

## 2020-02-08 NOTE — PLAN OF CARE
Problem: Psychotic Symptoms  Goal: Psychotic Symptoms  Description  Signs and symptoms of listed problems will be absent or manageable.  Psychosis  hallucinations   Outcome: No Change  Flowsheets (Taken 2/6/2020 1411 by Darren Thomas)  Psychotic Symptoms Assessed: all  Psychotic Symptoms Present: affect;mood;insight;thought process;speech  Note:   Pt presents with a tense affect and irritable mood. Pt thought process and insight is poor. Pt attends focus group for a short period before going back to his room. Pt is withdrawn socially, and becomes easily confused and frustrated at staff. Pt denies hallucinations and Si.

## 2020-02-09 PROCEDURE — 25000132 ZZH RX MED GY IP 250 OP 250 PS 637: Performed by: PSYCHIATRY & NEUROLOGY

## 2020-02-09 PROCEDURE — 12400000 ZZH R&B MH

## 2020-02-09 RX ADMIN — CHOLECALCIFEROL TAB 50 MCG (2000 UNIT) 2000 UNITS: 50 TAB at 08:56

## 2020-02-09 RX ADMIN — ASPIRIN 81 MG: 81 TABLET, COATED ORAL at 08:56

## 2020-02-09 RX ADMIN — SALMETEROL XINAFOATE 1 PUFF: 50 POWDER, METERED ORAL; RESPIRATORY (INHALATION) at 08:59

## 2020-02-09 RX ADMIN — HALOPERIDOL 5 MG: 5 TABLET ORAL at 20:46

## 2020-02-09 RX ADMIN — BENZTROPINE MESYLATE 0.5 MG: 0.5 TABLET ORAL at 20:47

## 2020-02-09 RX ADMIN — BENZTROPINE MESYLATE 0.5 MG: 0.5 TABLET ORAL at 08:56

## 2020-02-09 RX ADMIN — ATORVASTATIN CALCIUM 80 MG: 40 TABLET, FILM COATED ORAL at 20:47

## 2020-02-09 RX ADMIN — LEVOTHYROXINE SODIUM 88 MCG: 88 TABLET ORAL at 08:56

## 2020-02-09 RX ADMIN — SALMETEROL XINAFOATE 1 PUFF: 50 POWDER, METERED ORAL; RESPIRATORY (INHALATION) at 20:47

## 2020-02-09 RX ADMIN — HALOPERIDOL 5 MG: 5 TABLET ORAL at 08:56

## 2020-02-09 RX ADMIN — HALOPERIDOL 5 MG: 5 TABLET ORAL at 15:47

## 2020-02-09 NOTE — PLAN OF CARE
Pt was visible on the unit, minimally social with peers, spent the shift alternately bedresting, watching TV in the lounge or using the exercise bike. Pt brightens uplon approach and is pleasant and polite with staff. He attended an activity group and participated. Continues to appear disheveled with neglected grooming. Compliant with medications and nursing cares.

## 2020-02-09 NOTE — PLAN OF CARE
Pt was visible in the lounge or bedresting. He continues to appear disheveled with neglected grooming. Pt continues to show evidence of short term memory deficits (forgetting this writer brought him a snack 30 minutes later, requesting a snack.) He brightens upon approach, med compliant, pleasant and cooperative.

## 2020-02-09 NOTE — PLAN OF CARE
Pt spent time out in the hallway, he spoke to staff about his friend who has 5.8 million dollars and left it all for him. Pt denied having any hallucinations this evening.  He spent the majority of the shift in his room bed resting. He presents as calm in mood, is disheveled in appearance. Pt did not attend any evening groups.

## 2020-02-10 VITALS
HEART RATE: 89 BPM | OXYGEN SATURATION: 99 % | TEMPERATURE: 97.6 F | DIASTOLIC BLOOD PRESSURE: 84 MMHG | WEIGHT: 149 LBS | RESPIRATION RATE: 16 BRPM | SYSTOLIC BLOOD PRESSURE: 120 MMHG

## 2020-02-10 PROCEDURE — 25000132 ZZH RX MED GY IP 250 OP 250 PS 637: Performed by: PSYCHIATRY & NEUROLOGY

## 2020-02-10 PROCEDURE — 99232 SBSQ HOSP IP/OBS MODERATE 35: CPT | Performed by: PSYCHIATRY & NEUROLOGY

## 2020-02-10 PROCEDURE — G0177 OPPS/PHP; TRAIN & EDUC SERV: HCPCS

## 2020-02-10 PROCEDURE — 12400000 ZZH R&B MH

## 2020-02-10 RX ORDER — HALOPERIDOL 5 MG/1
5 TABLET ORAL 2 TIMES DAILY
Qty: 60 TABLET | Refills: 0 | Status: SHIPPED | OUTPATIENT
Start: 2020-02-10 | End: 2020-09-09

## 2020-02-10 RX ORDER — BENZTROPINE MESYLATE 0.5 MG/1
0.5 TABLET ORAL 2 TIMES DAILY
Qty: 60 TABLET | Refills: 0 | Status: SHIPPED | OUTPATIENT
Start: 2020-02-10 | End: 2020-09-09

## 2020-02-10 RX ORDER — HALOPERIDOL 2 MG/1
2 TABLET ORAL 2 TIMES DAILY
Qty: 60 TABLET | Refills: 0 | Status: SHIPPED | OUTPATIENT
Start: 2020-02-10 | End: 2020-09-09

## 2020-02-10 RX ADMIN — ATORVASTATIN CALCIUM 80 MG: 40 TABLET, FILM COATED ORAL at 21:16

## 2020-02-10 RX ADMIN — HALOPERIDOL 5 MG: 5 TABLET ORAL at 16:53

## 2020-02-10 RX ADMIN — HALOPERIDOL 5 MG: 5 TABLET ORAL at 08:16

## 2020-02-10 RX ADMIN — CHOLECALCIFEROL TAB 50 MCG (2000 UNIT) 2000 UNITS: 50 TAB at 08:16

## 2020-02-10 RX ADMIN — HALOPERIDOL 5 MG: 5 TABLET ORAL at 21:16

## 2020-02-10 RX ADMIN — BENZTROPINE MESYLATE 0.5 MG: 0.5 TABLET ORAL at 08:16

## 2020-02-10 RX ADMIN — ASPIRIN 81 MG: 81 TABLET, COATED ORAL at 08:16

## 2020-02-10 RX ADMIN — SALMETEROL XINAFOATE 1 PUFF: 50 POWDER, METERED ORAL; RESPIRATORY (INHALATION) at 21:19

## 2020-02-10 RX ADMIN — LEVOTHYROXINE SODIUM 88 MCG: 88 TABLET ORAL at 08:16

## 2020-02-10 RX ADMIN — BENZTROPINE MESYLATE 0.5 MG: 0.5 TABLET ORAL at 21:16

## 2020-02-10 RX ADMIN — SALMETEROL XINAFOATE 1 PUFF: 50 POWDER, METERED ORAL; RESPIRATORY (INHALATION) at 08:25

## 2020-02-10 ASSESSMENT — ACTIVITIES OF DAILY LIVING (ADL)
ORAL_HYGIENE: PROMPTS
LAUNDRY: WITH SUPERVISION
DRESS: INDEPENDENT
LAUNDRY: WITH SUPERVISION
HYGIENE/GROOMING: PROMPTS
ORAL_HYGIENE: PROMPTS
DRESS: INDEPENDENT
HYGIENE/GROOMING: PROMPTS
DRESS: INDEPENDENT
ORAL_HYGIENE: PROMPTS
LAUNDRY: WITH SUPERVISION
HYGIENE/GROOMING: PROMPTS

## 2020-02-10 NOTE — PLAN OF CARE
Problem: Psychotic Symptoms  Goal: Psychotic Symptoms  Description  Signs and symptoms of listed problems will be absent or manageable.  Psychosis  hallucinations   Outcome: Improving  Flowsheets (Taken 2/10/2020 1407)  Psychotic Symptoms Assessed: all  Psychotic Symptoms Present: insight; thought process; affect; mood; psychomotor activity; speech; memory deficits  Note:   Pt presents with a blunt affect and an anxious mood. Pt spent the majority of the shift in his room resting, although did spent a good portion in the milieu. Pt declined all unit programming. Pt was easily confused at times, but did not seem to be responding to internal stimuli. Pt was minimally social but more warm upon approach. Pt stated that he does not want to return to his past group home. Pt showered, ate all of his food and was med compliant. Pt denies Si.

## 2020-02-10 NOTE — PLAN OF CARE
BEHAVIORAL TEAM DISCUSSION    Participants: MD, CM, RN  Progress: isolative, showered, blunt, anxious, declined most groups  Anticipated length of stay: 1 day  Continued Stay Criteria/Rationale: provisional discharge 2/11  Medical/Physical: NA  Precautions:   Behavioral Orders   Procedures    Assault precautions    Code 1 - Restrict to Unit    Fall precautions    Routine Programming     As clinically indicated    Status 15     Every 15 minutes.     Plan: medication management, provisional discharge 2/11  Rationale for change in precautions or plan: NA

## 2020-02-10 NOTE — PROGRESS NOTES
Spoke with Aileen at [facility] [phone].  They will pick patient up tomorrow morning at 7:00 AM for a neurology appointment that he has at 8:00 AM.  Patient is to be discharged to them.  Patient gets his outpatient care through [facility].  [facility] will make appointments.  A voice mail was left for his , Gina Waleskaalybertin HARRIS [phone]  to notify her and requested a fax number to fax the provisional discharge to her for her signature.  I spoke with patient and read him the provisional discharge agreement.  He refused to sign and stated he wasn't going back to [facility].  He stated he had a mansion to go to.  Aileen at [facility] is aware of patient's delusion about owning a mansion.      When we fax the PD to Gina, it will need to be signed and returned to us.  Once it is returned to us it will need to be faxed to [facility] and the original placed on the chart.      Gina called back with her fax number [phone].  I faxed the Provisional Discharge to her for her signature.  She will sign and fax back so we can fax to [facility].

## 2020-02-10 NOTE — PROGRESS NOTES
Patient attended Process Group today and participated minimally. Patient demonstrated emerging insight into the topic of discussion.

## 2020-02-10 NOTE — PROGRESS NOTES
"[facility]  Psychiatric Progress Note    Length of stay (days): 20        Interim History:   The patient's care was discussed with the treatment team during the daily team meeting and/or staff's chart notes were reviewed.  Staff report: No acute issues overnight. Taking medications.  Sleeping and eating adequately.     The patient had no complaints regarding mood or anxiety symptoms.    Auditory hallucinations continue to occur however the intensity is diminishing with less emotional distress.  He continues to believe [place]    He inquired regarding when he may return home.    He denied suicidal and homicidal thoughts.  No medication side effects reported.          Medications:       aspirin  81 mg Oral Daily     atorvastatin  80 mg Oral At Bedtime     benztropine  0.5 mg Oral BID     haloperidol  5 mg Oral TID     levothyroxine  88 mcg Oral QAM AC     salmeterol  1 puff Inhalation BID     vitamin D3  2,000 Units Oral Daily          Allergies:     Allergies   Allergen Reactions     Ibuprofen      Latex           Labs:   No results found for this or any previous visit (from the past 24 hour(s)).       Psychiatric Examination:     /84   Pulse 89   Temp 97.6  F (36.4  C) (Oral)   Resp 16   Wt 67.6 kg (149 lb)   SpO2 99%   Weight is 149 lbs 0 oz  There is no height or weight on file to calculate BMI.  Orthostatic Vitals       Most Recent      Lying Orthostatic /90 01/23 1351    Lying Orthostatic Pulse (bpm) 90 01/23 1351    Sitting Orthostatic /91 01/23 1351    Sitting Orthostatic Pulse (bpm) 93 01/23 1351    Standing Orthostatic /76 01/23 1351    Standing Orthostatic Pulse (bpm) 95 01/23 1351            Appearance: awake, alert  Attitude:  guarded  Eye Contact:  fair  Mood:  \"I am fine\"  Affect:  intensity is flat  Speech:  Minimal in content  Psychomotor Behavior:  tremor observed   Throught Process:  linear  Associations:  no loose " associations  Thought Content:  no evidence of suicidal ideation or homicidal ideation and auditory hallucinations present; he did not appear to be responding to overt psychosis.  Insight:  limited  Judgement:  limited  Oriented to:  time, person, and place  Attention Span and Concentration:  limited  Recent and Remote Memory:  fair    Clinical Global Impressions  First:  Considering your total clinical experience with this particular patient population, how severe are the patient's symptoms at this time?: 7 (01/22/20 1019)  Compared to the patient's condition at the START of treatment, this patient's condition is:: 4 (01/22/20 1019)  Most recent:  Considering your total clinical experience with this particular patient population, how severe are the patient's symptoms at this time?: 7 (01/22/20 1019)  Compared to the patient's condition at the START of treatment, this patient's condition is:: 4 (01/22/20 1019)         Precautions:     Behavioral Orders   Procedures     Assault precautions     Code 1 - Restrict to Unit     Fall precautions     Routine Programming     As clinically indicated     Status 15     Every 15 minutes.          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  Reduction of psychosis has been noted since initiating Haldol.  Continue Cogentin for management of EPS.    Noting some cognitive impairments, the patient would benefit from an outpatient neurologic assessment.  Staff at his group home report having scheduled an outpatient appointment for him.    Psychosocial treatments to be addressed with social  work consult and groups.    Legal Status: Under involuntary commitment for treatment of mental illness with Yanez order.    Disposition: Begin planning for discharge home tomorrow.  Group home staff is comfortable managing residual symptoms while at the home.

## 2020-02-11 ENCOUNTER — TRANSFERRED RECORDS (OUTPATIENT)
Dept: HEALTH INFORMATION MANAGEMENT | Facility: CLINIC | Age: 55
End: 2020-02-11

## 2020-02-11 PROCEDURE — 25000132 ZZH RX MED GY IP 250 OP 250 PS 637: Performed by: PSYCHIATRY & NEUROLOGY

## 2020-02-11 PROCEDURE — 99239 HOSP IP/OBS DSCHRG MGMT >30: CPT | Performed by: PSYCHIATRY & NEUROLOGY

## 2020-02-11 RX ADMIN — BENZTROPINE MESYLATE 0.5 MG: 0.5 TABLET ORAL at 06:46

## 2020-02-11 RX ADMIN — LEVOTHYROXINE SODIUM 88 MCG: 88 TABLET ORAL at 06:46

## 2020-02-11 RX ADMIN — CHOLECALCIFEROL TAB 50 MCG (2000 UNIT) 2000 UNITS: 50 TAB at 06:46

## 2020-02-11 RX ADMIN — SALMETEROL XINAFOATE 1 PUFF: 50 POWDER, METERED ORAL; RESPIRATORY (INHALATION) at 06:49

## 2020-02-11 RX ADMIN — ASPIRIN 81 MG: 81 TABLET, COATED ORAL at 06:46

## 2020-02-11 RX ADMIN — HALOPERIDOL 5 MG: 5 TABLET ORAL at 06:46

## 2020-02-11 NOTE — DISCHARGE INSTRUCTIONS
"Behavioral Discharge Planning and Instructions      Summary:  You were admitted to [address] due to increased psychosis and confusion.   You acknowledged hearing and seeing things.    You also had grandiose delusions - often stating you have \"two mansions.\"  You were under the care of Dr Batres, who made medication adjustments.   You were committed on 1-31-20 as MI to [facility] and [facility].   There is also a Yanez order. Commitment expires July 31, 2020    You are being Provisionally Discharged back to [facility] today.       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)  History of major depressive disorder  Alcohol use disorder, in remission  Stimulant use disorder, in remission      Mental Health Follow-Up:     [address]  [phone]  Fax [phone]    [facility] (**you are being transitioned over from care at the [facility] to [facility])    Dr Jose Torres - Next appointment:  Thursday March 12th at 11am  Phone:[phone]  Fax: [phone]  [facility] [id number] [place] [address]  [phone]  Fax [phone]  Appointments with Dr. Isaac Watkins MD and JUAN Emerson will be scheduled by you and the staff of [facility], there is an appointment scheduled with Denita on February 18     Attend all scheduled appointments with your outpatient providers. Call at least 24 hours in advance if you need to reschedule an appointment to ensure continued access to your outpatient providers.   Major Treatments, Procedures and Findings:  You were provided with: a psychiatric assessment, assessed " for medical stability, medication evaluation and/or management and group therapy    Symptoms to Report: feeling more aggressive, increased confusion, losing more sleep, mood getting worse or thoughts of suicide    Early warning signs can include: increased depression or anxiety sleep disturbances increased thoughts or behaviors of suicide or self-harm  increased unusual thinking, such as paranoia or hearing voices    Safety and Wellness:  Take all medicines as directed.  Make no changes unless your doctor suggests them.      Follow treatment recommendations.  Refrain from alcohol and non-prescribed drugs.  If there is a concern for safety, call 911.    Resources:   COPE (Community Outreach for Psychiatric Emergencies): 524.822.1809.  Available 24-hours a day, 7 days a week.  COPE professionals are available to:go where you are. Telephone consultations are also available.  Crisis Intervention: 169.802.2709 or 800-051-1978 (TTY: 547.551.6193).  Call anytime for help.    National [place] on Mental Illness (www.mn.martin.org): 364.192.6698 or 535-742-5076.  National Suicide Prevention Line (www.mentalhealthmn.org): 332-508-MYXA (9086)  Mental Health Consumer/Survivor Network of [place] (www.mhcsn.net): 675.916.2839 or 619-185-6298  Mental Health Association of [place] (www.mentalhealth.org): 690.893.3301 or 630-588-6378    The treatment team has appreciated the opportunity to work with you. If you have any questions or concerns our unit number is [phone].

## 2020-02-11 NOTE — PROGRESS NOTES
Discharge instructions reviewed with patient including follow-up care plan. Educated on medication regime and advised not to stop prescribed medication without consulting their physician. All belongings which where brought into the hospital have been returned to patient. Escorted off the unit by Mario corcoran [facility] at 06.

## 2020-02-11 NOTE — PLAN OF CARE
Shift Update:Pt denies suicidal ideation, was med and food compliant, attended groups. Pt signed all paperwork for discharge and took a shower. Participated well in the group.

## 2020-02-17 ENCOUNTER — HOSPITAL ENCOUNTER (EMERGENCY)
Facility: CLINIC | Age: 55
Discharge: HOME | End: 2020-02-17
Attending: EMERGENCY MEDICINE | Admitting: EMERGENCY MEDICINE
Payer: COMMERCIAL

## 2020-02-17 VITALS
SYSTOLIC BLOOD PRESSURE: 114 MMHG | TEMPERATURE: 97.4 F | DIASTOLIC BLOOD PRESSURE: 78 MMHG | RESPIRATION RATE: 14 BRPM | OXYGEN SATURATION: 100 %

## 2020-02-17 DIAGNOSIS — R45.1 AGITATION: ICD-10-CM

## 2020-02-17 LAB
ALBUMIN SERPL-MCNC: 3.7 G/DL (ref 3.4–5)
ALBUMIN UR-MCNC: NEGATIVE MG/DL
ALP SERPL-CCNC: 108 U/L (ref 40–150)
ALT SERPL W P-5'-P-CCNC: 128 U/L (ref 0–70)
AMMONIA PLAS-SCNC: 41 UMOL/L (ref 10–50)
AMPHETAMINES UR QL SCN: NEGATIVE
ANION GAP SERPL CALCULATED.3IONS-SCNC: 5 MMOL/L (ref 3–14)
APPEARANCE UR: CLEAR
AST SERPL W P-5'-P-CCNC: 163 U/L (ref 0–45)
BARBITURATES UR QL: NEGATIVE
BASOPHILS # BLD AUTO: 0.1 10E9/L (ref 0–0.2)
BASOPHILS NFR BLD AUTO: 1.9 %
BENZODIAZ UR QL: NEGATIVE
BILIRUB SERPL-MCNC: 0.4 MG/DL (ref 0.2–1.3)
BILIRUB UR QL STRIP: NEGATIVE
BUN SERPL-MCNC: 22 MG/DL (ref 7–30)
CALCIUM SERPL-MCNC: 9.1 MG/DL (ref 8.5–10.1)
CANNABINOIDS UR QL SCN: NEGATIVE
CHLORIDE SERPL-SCNC: 110 MMOL/L (ref 94–109)
CO2 SERPL-SCNC: 23 MMOL/L (ref 20–32)
COCAINE UR QL: NEGATIVE
COLOR UR AUTO: YELLOW
CREAT SERPL-MCNC: 0.67 MG/DL (ref 0.66–1.25)
DIFFERENTIAL METHOD BLD: ABNORMAL
EOSINOPHIL # BLD AUTO: 0.6 10E9/L (ref 0–0.7)
EOSINOPHIL NFR BLD AUTO: 11.1 %
ERYTHROCYTE [DISTWIDTH] IN BLOOD BY AUTOMATED COUNT: 12.8 % (ref 10–15)
ETHANOL SERPL-MCNC: <0.01 G/DL
GFR SERPL CREATININE-BSD FRML MDRD: >90 ML/MIN/{1.73_M2}
GLUCOSE SERPL-MCNC: 92 MG/DL (ref 70–99)
GLUCOSE UR STRIP-MCNC: NEGATIVE MG/DL
HCT VFR BLD AUTO: 41.8 % (ref 40–53)
HGB BLD-MCNC: 14.7 G/DL (ref 13.3–17.7)
HGB UR QL STRIP: NEGATIVE
IMM GRANULOCYTES # BLD: 0 10E9/L (ref 0–0.4)
IMM GRANULOCYTES NFR BLD: 0.2 %
KETONES UR STRIP-MCNC: NEGATIVE MG/DL
LEUKOCYTE ESTERASE UR QL STRIP: NEGATIVE
LYMPHOCYTES # BLD AUTO: 1 10E9/L (ref 0.8–5.3)
LYMPHOCYTES NFR BLD AUTO: 18.7 %
MCH RBC QN AUTO: 33.6 PG (ref 26.5–33)
MCHC RBC AUTO-ENTMCNC: 35.2 G/DL (ref 31.5–36.5)
MCV RBC AUTO: 96 FL (ref 78–100)
MONOCYTES # BLD AUTO: 0.5 10E9/L (ref 0–1.3)
MONOCYTES NFR BLD AUTO: 9.1 %
NEUTROPHILS # BLD AUTO: 3.2 10E9/L (ref 1.6–8.3)
NEUTROPHILS NFR BLD AUTO: 59 %
NITRATE UR QL: NEGATIVE
NRBC # BLD AUTO: 0 10*3/UL
NRBC BLD AUTO-RTO: 0 /100
OPIATES UR QL SCN: NEGATIVE
PCP UR QL SCN: NEGATIVE
PH UR STRIP: 6 PH (ref 5–7)
PLATELET # BLD AUTO: 196 10E9/L (ref 150–450)
POTASSIUM SERPL-SCNC: 4 MMOL/L (ref 3.4–5.3)
PROT SERPL-MCNC: 8.2 G/DL (ref 6.8–8.8)
RBC # BLD AUTO: 4.37 10E12/L (ref 4.4–5.9)
SODIUM SERPL-SCNC: 138 MMOL/L (ref 133–144)
SOURCE: NORMAL
SP GR UR STRIP: 1.03 (ref 1–1.03)
UROBILINOGEN UR STRIP-MCNC: NORMAL MG/DL (ref 0–2)
WBC # BLD AUTO: 5.4 10E9/L (ref 4–11)

## 2020-02-17 PROCEDURE — 80307 DRUG TEST PRSMV CHEM ANLYZR: CPT | Performed by: EMERGENCY MEDICINE

## 2020-02-17 PROCEDURE — 90791 PSYCH DIAGNOSTIC EVALUATION: CPT

## 2020-02-17 PROCEDURE — 80320 DRUG SCREEN QUANTALCOHOLS: CPT | Performed by: EMERGENCY MEDICINE

## 2020-02-17 PROCEDURE — 82140 ASSAY OF AMMONIA: CPT | Performed by: EMERGENCY MEDICINE

## 2020-02-17 PROCEDURE — 99285 EMERGENCY DEPT VISIT HI MDM: CPT | Mod: 25

## 2020-02-17 PROCEDURE — 85025 COMPLETE CBC W/AUTO DIFF WBC: CPT | Performed by: EMERGENCY MEDICINE

## 2020-02-17 PROCEDURE — 81003 URINALYSIS AUTO W/O SCOPE: CPT | Performed by: EMERGENCY MEDICINE

## 2020-02-17 PROCEDURE — 80053 COMPREHEN METABOLIC PANEL: CPT | Performed by: EMERGENCY MEDICINE

## 2020-02-17 RX ORDER — LORAZEPAM 1 MG/1
1 TABLET ORAL EVERY 6 HOURS PRN
Qty: 20 TABLET | Refills: 0 | Status: SHIPPED | OUTPATIENT
Start: 2020-02-17 | End: 2020-09-09

## 2020-02-17 RX ORDER — OLANZAPINE 5 MG/1
5 TABLET, ORALLY DISINTEGRATING ORAL EVERY 12 HOURS PRN
Status: DISCONTINUED | OUTPATIENT
Start: 2020-02-17 | End: 2020-02-17 | Stop reason: HOSPADM

## 2020-02-17 RX ORDER — OLANZAPINE 5 MG/1
5 TABLET, ORALLY DISINTEGRATING ORAL EVERY 12 HOURS PRN
Qty: 10 TABLET | Refills: 0 | Status: SHIPPED | OUTPATIENT
Start: 2020-02-17 | End: 2020-02-17

## 2020-02-17 ASSESSMENT — ENCOUNTER SYMPTOMS
FEVER: 0
CONFUSION: 0

## 2020-02-17 NOTE — DISCHARGE INSTRUCTIONS
Discharge Instructions  Mental Health Concerns    You were seen today for mental health concerns, such as depression, anxiety, or suicidal thinking. Your provider feels that you do not require hospitalization at this time. However, your symptoms may become worse, and you may need to return to the Emergency Department. Most treatments of depression and suicidal thoughts are a process rather than a single intervention.  Medications and counseling can take several weeks or more to help.    Generally, every Emergency Department visit should have a follow-up clinic visit with either a primary or a specialty clinic/provider. Please follow-up as instructed by your emergency provider today.    By accepting these discharge instructions:  You promise to not harm yourself or others.  You agree that if you feel you are becoming unable to keep that promise, you will do something to help yourself before you do anything to harm yourself or others.   You agree to keep any safety plan arranged on your visit here today.  You agree to take any medication prescribed or recommended by your provider.  If you are getting worse, you can contact a friend or a family member, contact your counselor or family provider, contact a crisis line, or other options discussed with the provider or therapist today.  At any time, you can call 911 and return to the Emergency Department for more help.  You understand that follow-up is essential to your treatment, and you will make and keep appointments recommended on your visit today.    How to improve your mental health and prevent suicide:  Involve others by letting family, friends, counselors know.  Do not isolate yourself.  Avoid alcohol or drugs. Remove weapons, poisons from your home.  Try to stick to routines for eating, sleeping and getting regular exercise.    Try to get into sunlight. Bright natural light not only treats seasonal affective disorder but also depression.  Increase safe activities  that you enjoy.    If you feel worse, contact 1-800-suicide (1-929.606.6797), or call 911, or your primary provider/counselor for additional assistance.    If you were given a prescription for medicine here today, be sure to read all of the information (including the package insert) that comes with your prescription.  This will include important information about the medicine, its side effects, and any warnings that you need to know about.  The pharmacist who fills the prescription can provide more information and answer questions you may have about the medicine.  If you have questions or concerns that the pharmacist cannot address, please call or return to the Emergency Department.   Remember that you can always come back to the Emergency Department if you are not able to see your regular provider in the amount of time listed above, if you get any new symptoms, or if there is anything that worries you.

## 2020-02-17 NOTE — ED PROVIDER NOTES
History     Chief Complaint:  Aggressive behavior    HPI   John Juárez is a 54 year old male who presents with reports of agitation at his group home.  Upon arrival to the ED the patient is resting calm and comfortable, denies suicidal or homicidal ideation, denies active hallucinations.  He states that it is been approximately 8 months since his last drink of alcohol.  He has had a recent hospitalization for psychosis at [facility] as well as a visit to [facility].  From reports from EMS and the group home staff the patient was agitated on Friday and throat remote control.  There is reports that he has been shaking his arms in the air in an aggressive fashion however is not struck anyone.    Allergies:  Ibuprofen  Latex      Medications:    Aspirin 81 mg   Lipitor  Cogentin  Haldol  Levothyroxine      Past Medical History:    COPD  CAD  Hypertension   Substance abuse  TBI     Past Surgical History:    Abdomen surgery  Appendectomy     Family History:    History reviewed. No pertinent family history.      Social History:  Smoking Status: Current Smoker  Alcohol Use: No  Marital Status:  Single [1]     Review of Systems   Constitutional: Negative for fever.   Cardiovascular: Negative for chest pain.   Psychiatric/Behavioral: Negative for confusion and suicidal ideas.   All other systems reviewed and are negative.        Physical Exam   First Vitals:  BP: 114/78  Heart Rate: 101  Temp: 97.4  F (36.3  C)  Resp: 14  SpO2: 100 %      Physical Exam  General: Alert, interactive  Head:  Scalp is atraumatic  Eyes:  The pupils are equal, round, and reactive to light    EOM's intact    No scleral icterus  ENT:      Nose:  The external nose is normal  Ears:  External ears are normal  Mouth/Throat: The oropharynx is normal    Mucus membranes are moist       Neck:  Normal range of motion.      There is no rigidity.    Trachea is in the midline         CV:  Regular rate and rhythm    No murmur   Resp:  Breath sounds are  clear bilaterally    Non-labored, no retractions or accessory muscle use      GI:  Abdomen is soft, no distension, no tenderness.       MS:  Normal strength in all 4 extremities  Skin:  Warm and dry, No rash or lesions noted.  Neuro: Strength 5/5 x4.  Sensation intact  In all 4 extremities.        Psych:  Awake. Alert. Flat affect.      Appropriate interactions.  Denies suicidal or homicidal ideation    Emergency Department Course     Laboratory:  CBC: WNL (WBC 5.4, HGB 14.7, )   CMP: Chloride 110 (H),  (H),  (H), otherwise WNL (Creatinine 0.67)   Ammonia: 41  Ethanol level: <0.01  Drug abuse screen: Negative  UA: Negative    Interventions:  Medications   OLANZapine zydis (zyPREXA) ODT tab 5 mg (has no administration in time range)      Emergency Department Course:  Past medical records, nursing notes, and vitals reviewed.  1244: I performed an exam of the patient and obtained history, as documented above.      1309: I Discussed the patient's findings with DEC following their evaluation. They recommend discharge to group home and will call them to discuss disposition.    Evaluated by myself as well as Genaro from the behavioral health service.    Findings and plan explained to the Patient. Patient discharged home with instructions regarding supportive care, medications, and reasons to return. The importance of close follow-up was reviewed.      Impression & Plan      Medical Decision Making:  Following presentation history and physical examination were performed, the patient denies any active complaints, denies any thoughts of suicide or homicide.  Collateral information was obtained from the group home who relates he has been intermittently agitated and they are concerned that they are able to meet his medical and psychiatric needs.  Throughout the stay in the emergency department he was resting comfortably was not agitated there is no indication for acute hospitalization.  PRN lorazepam was  ordered for temporary PRN use.  We have advised the group home to contact the patient's psychiatrist as well as primary care provider to discuss possible further options if they feel they cannot care for the patient at this time.  He has an appointment tomorrow.  He is hemodynamically stable with unremarkable laboratory work-up and was subsequently discharged home.    Diagnosis:    ICD-10-CM    1. Agitation R45.1        Disposition:  discharged to his group home    Discharge Medications:  New Prescriptions    LORAZEPAM (ATIVAN) 1 MG TABLET    Take 1 tablet (1 mg) by mouth every 6 hours as needed for agitation or anxiety       Himanshu Lawson  2/17/2020    EMERGENCY DEPARTMENT    Himanshu GUTIERREZ am serving as a scribe at 12:44 PM on 2/17/2020 to document services personally performed by Frederick Lorenz MD based on my observations and the provider's statements to me.       Frederick Lorenz MD  02/17/20 151

## 2020-02-17 NOTE — ED NOTES
DEC spoke with House RN at group home (Carol [email or web address]@ [phone]) several times and explained dispo. Dr. Lorenz will be prescribing zyprexa prn, and [facility] would like lab results printed with discharge paperwork. [facility] [place] will call back with ETA for . MISTI

## 2020-02-21 NOTE — DISCHARGE SUMMARY
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  On initial examination, the patient was not willing to cooperate with his plan of care while noting concern for symptoms of psychosis, associated impairments, and recent aggressive behavior, influencing the decision to pursue a petition for involuntary commitment for treatment of mental illness.  Through the court examination process, the petition for commitment was supported eventually resulting in a court ordered commitment for treatment of mental illness.    For treatment of psychosis, Zyprexa was initially restarted and the dose was titrated up however the patient did not display adequate benefit to the medication.  Zyprexa was then discontinued and replaced with Haldol to which he demonstrated a much more therapeutic response.  Psychotic symptoms began to diminish and were significantly reduced by the time of discharge.  The patient did not display any hostile or aggressive behaviors.  He was eating his meals and sleeping adequately.  He continuously denied suicidal ideation.    Noting that the patient had completed his treatment goals, plans were made to transition his care back to his group home and onto outpatient providers.  He was evaluated by his group home and staff felt comfortable managing residual symptoms from the group home  setting.    The patient did display a tremor throughout his hospital stay along with continuous cognitive impairments.  Cognitive impairments were noted prior to initiating Cogentin.  The tremor was noted prior to initiating antipsychotic medication however did seem worse on Haldol and slightly improved with Cogentin.  His group home staff it scheduled an appointment with a neurologist for further evaluation of the symptoms.    Other interventions received during his hospitalization included:   Psychosocial treatments were addressed with groups, social work consult, and supportive milieu provided by staff.    CONDITION AT DISCHARGE:  Improved.  The patients acute suicide risk is low due to the following factors:  improved mood/anxiety symptoms.  Denies suicidal ideations.  Decreased psychotic symptoms.  Not actively intoxicated and plans to abstain from illicit substances and alcohol.  Denies access to guns.  Denies feeling hopeless or helpless. At the time of discharge John Juárez was determined to not be an immediate danger to herself or others. The patient's acute risk will be higher if noncompliant with treatment plan, medications, follow-up or using illicit substances or alcohol.  These findings along with the risks of noncompliance with medications and treatment plan, which could potentially cause decompensation and increase the risk for suicide, were discussed with the patient.  The patients chronic suicide risk is moderate given the following factors: Suspected diagnosis of Schizophrenia, Denied a family history of suicide.  Preventative factors include: social supports, stable housing     MENTAL STATUS EXAMINATION AT TIME OF DISCHARGE:  The patient is 54 year old White male who appears their stated age and is appropriately dressed with good hygiene.  Calm and cooperative with the interview questions.  A moderate hand tremor was noted. Eye contact is appropriate.  Speech was minimal and content, normal  "volume. Mood is \"fine\" and affect is flat.  The patient does not seem overtly depressed, anxious, manic or irritable.  Thought process is linear.  Thought process is not tangential, circumstantial or disorganized.  Thought content was notable for delusions of grandeur, citing that he resides in several different mansions in the [facility].  The patient denies suicidal and homicidal ideations as well as auditory and visual hallucinations.  Insight was limited, judgment also appears limited.  Cognition was overall less than expected, noting inability to identify the correct month and year, he was aware that he was in the hospital however could not identify what he was being treated for, concentration was limited, fund of knowledge appeared limited, recent and remote memory appeared impaired.  Muscle strength, tone and gait appear normal on visual inspection.       DISPOSITION:  The patient is provisionally discharged back to his group home while under involuntary commitment for treatment of mental illness.    FOLLOWUP APPOINTMENTS:  ( per social workers notes and after visit summary)  [address]  [phone]  Fax [phone]     [facility] (**you are being transitioned over from care at the [facility] to [facility])     Dr Jose Torres - Next appointment:  Thursday March 12th at 11am  Phone:[phone]  Fax: [phone]  [address] [place] [address] [id number] [place] [address]  [phone]  Fax [phone]  Appointments with Dr. Isaac Watkins MD and JUAN Emerson will be scheduled by you and the staff of [facility], there is an appointment scheduled with Denita on February 18     DISCHARGE MEDICATIONS:   Discharge Medication List as of 2/11/2020 10:53 AM      START taking these medications    Details   benztropine (COGENTIN) 0.5 MG tablet Take 1 tablet (0.5 mg) by mouth " 2 times daily, Disp-60 tablet, R-0, E-Prescribe      !! haloperidol (HALDOL) 2 MG tablet Take 1 tablet (2 mg) by mouth 2 times daily (Take with a 5mg tablet to total 7mg twice a day), Disp-60 tablet, R-0, E-Prescribe      !! haloperidol (HALDOL) 5 MG tablet Take 1 tablet (5 mg) by mouth 2 times daily (Take with a 2mg tablet to total 7mg twice a day), Disp-60 tablet, R-0, E-Prescribe       !! - Potential duplicate medications found. Please discuss with provider.      CONTINUE these medications which have NOT CHANGED    Details   aspirin 81 MG EC tablet Take 81 mg by mouth daily, Historical      atorvastatin (LIPITOR) 80 MG tablet Take 80 mg by mouth At Bedtime, Historical      levothyroxine (SYNTHROID/LEVOTHROID) 88 MCG tablet Take 88 mcg by mouth daily, Historical      salmeterol (SEREVENT) 50 MCG/DOSE inhaler Inhale 1 puff into the lungs 2 times daily, Historical      vitamin D3 (CHOLECALCIFEROL) 2000 units (50 mcg) tablet Take 1 tablet by mouth daily, Historical         STOP taking these medications       Dextromethorphan-guaiFENesin  MG/5ML syrup Comments:   Reason for Stopping:         hydrOXYzine (ATARAX) 10 MG tablet Comments:   Reason for Stopping:         hydrOXYzine (ATARAX) 50 MG tablet Comments:   Reason for Stopping:         OLANZapine (ZYPREXA) 5 MG tablet Comments:   Reason for Stopping:         vitamin A & D (BABY) external ointment Comments:   Reason for Stopping:                LABORATORY RESULTS: (past 14 days)  Recent Results (from the past 336 hour(s))   Drug abuse screen 77 urine (FL, RH, SH)    Collection Time: 02/17/20 12:59 PM   Result Value Ref Range    Amphetamine Qual Urine Negative NEG^Negative    Barbiturates Qual Urine Negative NEG^Negative    Benzodiazepine Qual Urine Negative NEG^Negative    Cannabinoids Qual Urine Negative NEG^Negative    Cocaine Qual Urine Negative NEG^Negative    Opiates Qualitative Urine Negative NEG^Negative    PCP Qual Urine Negative NEG^Negative   UA  reflex to Microscopic and Culture    Collection Time: 02/17/20 12:59 PM   Result Value Ref Range    Color Urine Yellow     Appearance Urine Clear     Glucose Urine Negative NEG^Negative mg/dL    Bilirubin Urine Negative NEG^Negative    Ketones Urine Negative NEG^Negative mg/dL    Specific Gravity Urine 1.029 1.003 - 1.035    Blood Urine Negative NEG^Negative    pH Urine 6.0 5.0 - 7.0 pH    Protein Albumin Urine Negative NEG^Negative mg/dL    Urobilinogen mg/dL Normal 0.0 - 2.0 mg/dL    Nitrite Urine Negative NEG^Negative    Leukocyte Esterase Urine Negative NEG^Negative    Source Midstream Urine    CBC with platelets differential    Collection Time: 02/17/20  1:18 PM   Result Value Ref Range    WBC 5.4 4.0 - 11.0 10e9/L    RBC Count 4.37 (L) 4.4 - 5.9 10e12/L    Hemoglobin 14.7 13.3 - 17.7 g/dL    Hematocrit 41.8 40.0 - 53.0 %    MCV 96 78 - 100 fl    MCH 33.6 (H) 26.5 - 33.0 pg    MCHC 35.2 31.5 - 36.5 g/dL    RDW 12.8 10.0 - 15.0 %    Platelet Count 196 150 - 450 10e9/L    Diff Method Automated Method     % Neutrophils 59.0 %    % Lymphocytes 18.7 %    % Monocytes 9.1 %    % Eosinophils 11.1 %    % Basophils 1.9 %    % Immature Granulocytes 0.2 %    Nucleated RBCs 0 0 /100    Absolute Neutrophil 3.2 1.6 - 8.3 10e9/L    Absolute Lymphocytes 1.0 0.8 - 5.3 10e9/L    Absolute Monocytes 0.5 0.0 - 1.3 10e9/L    Absolute Eosinophils 0.6 0.0 - 0.7 10e9/L    Absolute Basophils 0.1 0.0 - 0.2 10e9/L    Abs Immature Granulocytes 0.0 0 - 0.4 10e9/L    Absolute Nucleated RBC 0.0    Comprehensive metabolic panel    Collection Time: 02/17/20  1:18 PM   Result Value Ref Range    Sodium 138 133 - 144 mmol/L    Potassium 4.0 3.4 - 5.3 mmol/L    Chloride 110 (H) 94 - 109 mmol/L    Carbon Dioxide 23 20 - 32 mmol/L    Anion Gap 5 3 - 14 mmol/L    Glucose 92 70 - 99 mg/dL    Urea Nitrogen 22 7 - 30 mg/dL    Creatinine 0.67 0.66 - 1.25 mg/dL    GFR Estimate >90 >60 mL/min/[1.73_m2]    GFR Estimate If Black >90 >60 mL/min/[1.73_m2]     Calcium 9.1 8.5 - 10.1 mg/dL    Bilirubin Total 0.4 0.2 - 1.3 mg/dL    Albumin 3.7 3.4 - 5.0 g/dL    Protein Total 8.2 6.8 - 8.8 g/dL    Alkaline Phosphatase 108 40 - 150 U/L     (H) 0 - 70 U/L     (H) 0 - 45 U/L   Ammonia    Collection Time: 02/17/20  1:18 PM   Result Value Ref Range    Ammonia 41 10 - 50 umol/L   Alcohol ethyl    Collection Time: 02/17/20  1:18 PM   Result Value Ref Range    Ethanol g/dL <0.01 <0.01 g/dL       >30 minutes was spent on this discharge to allow for reviewing the patient's response to treatment, reviewing plan of care, education on medications and diagnosis, and conducting a risk assessment.

## 2020-06-27 ENCOUNTER — HOSPITAL ENCOUNTER (EMERGENCY)
Facility: CLINIC | Age: 55
Discharge: NURSING, REHAB OR LONG-TERM CARE | End: 2020-06-27
Attending: EMERGENCY MEDICINE | Admitting: EMERGENCY MEDICINE
Payer: COMMERCIAL

## 2020-06-27 VITALS
DIASTOLIC BLOOD PRESSURE: 67 MMHG | OXYGEN SATURATION: 98 % | WEIGHT: 155 LBS | RESPIRATION RATE: 16 BRPM | TEMPERATURE: 97.7 F | SYSTOLIC BLOOD PRESSURE: 100 MMHG | HEART RATE: 80 BPM

## 2020-06-27 DIAGNOSIS — F09 COGNITIVE DYSFUNCTION: ICD-10-CM

## 2020-06-27 DIAGNOSIS — R46.89 BEHAVIOR INVOLVING RUNNING AWAY: ICD-10-CM

## 2020-06-27 PROCEDURE — 99285 EMERGENCY DEPT VISIT HI MDM: CPT | Mod: 25

## 2020-06-27 PROCEDURE — 25000132 ZZH RX MED GY IP 250 OP 250 PS 637: Performed by: EMERGENCY MEDICINE

## 2020-06-27 PROCEDURE — 90791 PSYCH DIAGNOSTIC EVALUATION: CPT

## 2020-06-27 RX ORDER — HALOPERIDOL 5 MG/1
5 TABLET ORAL ONCE
Status: COMPLETED | OUTPATIENT
Start: 2020-06-27 | End: 2020-06-27

## 2020-06-27 RX ORDER — OLANZAPINE 5 MG/1
5 TABLET, ORALLY DISINTEGRATING ORAL
Status: DISCONTINUED | OUTPATIENT
Start: 2020-06-27 | End: 2020-06-27 | Stop reason: HOSPADM

## 2020-06-27 RX ADMIN — HALOPERIDOL 5 MG: 5 TABLET ORAL at 02:23

## 2020-06-27 ASSESSMENT — ENCOUNTER SYMPTOMS: AGITATION: 1

## 2020-06-27 NOTE — ED NOTES
Called DEC about whether or not they were able to contact his group home. They said they will give us a call back.

## 2020-06-27 NOTE — ED NOTES
"At 0120 patient walked out to the nursing station asking for the restroom. Once he came out of the bathroom, he went to another room. When re-directed him he got upset and agitated. States \"I'm going to call the  and amanda this hospital, there is no reason for me to be here\". Pt refuses to get inside the room and is wondering in the nurse station. After a minute he just went inside his room.       "

## 2020-06-27 NOTE — DISCHARGE INSTRUCTIONS
Return for fevers, confusion, worsening behavioral concerns or other concerning issues.  Follow-up in the primary care clinic.

## 2020-06-27 NOTE — ED AVS SNAPSHOT
Emergency Department  [address] [place] [address]  Phone:  [phone]  Fax:  [phone]                                    John Juárez   MRN: [id number]    Department:   Emergency Department   Date of Visit:  6/27/2020           After Visit Summary Signature Page    I have received my discharge instructions, and my questions have been answered. I have discussed any challenges I see with this plan with the nurse or doctor.    ..........................................................................................................................................  Patient/Patient Representative Signature      ..........................................................................................................................................  Patient Representative Print Name and Relationship to Patient    ..................................................               ................................................  Date                                   Time    ..........................................................................................................................................  Reviewed by Signature/Title    ...................................................              ..............................................  Date                                               Time          22EPIC Rev 08/18

## 2020-06-27 NOTE — ED TRIAGE NOTES
Pt very upset because he thinks someone stole his SSN at the group home house that he resides at. Patient refuses to stay there and is now here because there is a directive signed by a  that states patient must go to the hospital if he refuses to stay at the group home house. This directive is good through July 31st of 2020.

## 2020-06-27 NOTE — ED NOTES
Pt refused to change into scrubs, wanded by security. Video Observation initiated, patient informed.     Josie Werner RN

## 2020-06-27 NOTE — ED NOTES
Bed: ED18  Expected date:   Expected time:   Means of arrival:   Comments:  445 54m on hold from [facility].

## 2020-06-27 NOTE — ED NOTES
RN called Baltazar at [facility] ([phone]) and will give a call when pt leaves in wheelchair transport.

## 2020-06-27 NOTE — ED NOTES
"Pt came out of his room to go to the bathroom, when he returned and walked through the nurse's station he stated \"This shits gotta stop. This bullshit has to stop.\" several times. He then refused to go back into his room stating he wanted to go home, and that he wanted to speak to the doctor. Staff told him several times he needs to go back into his room, he again refused to move out of the nurse's station. Security able to talk him down and walk him back to his room. MD notified.  "

## 2020-06-27 NOTE — ED PROVIDER NOTES
History     Chief Complaint:  Psychiatric Evaluation      HPI  John Juárez is a 54 year old year old male with a history of schizophrenia who presents via EMS from his group home for psychiatric evaluation. Tonight the patient reportedly became upset and agitated at his group home due to beliefs that they were stealing and using his social security number. The patient is reportedly on a commitment at his group home and given that he was refusing to stay at the group home EMS was called to bring him into the ED for evaluation.      Allergies:  Ibuprofen  Latex     Medications:   Aspirin 81  Lipitor  Cogentin  Haldol  Synthroid  Ativan  Serevent  Cholecalciferol    Medical History:   COPD  Heart disease  Hypertension  Substance abuse  Traumatic brain injury  Psychosis     Surgical History   Abdomen surgery  Appendectomy    Family History:   Alcoholism  Diabetes    Social History:  Patient was not accompanied to the ED.   Smoking Status:  Smoker    Type: Cigarettes   Packs/Day: 0.5  Smokeless Tobacco: Never Used   Alcohol Use: Negative  Drug Use: Negative    No Ref-Primary, Physician      Review of Systems   Constitutional: Negative for fever.   Respiratory: Negative for shortness of breath.    Cardiovascular: Negative for chest pain.   Gastrointestinal: Negative for abdominal pain.   Psychiatric/Behavioral: Positive for agitation.   All other systems reviewed and are negative.      Physical Exam     Patient Vitals for the past 24 hrs:   BP Temp Temp src Pulse Heart Rate Resp SpO2 Weight   06/27/20 0638 103/77 -- -- 74 -- 17 99 % --   06/27/20 0357 -- -- -- -- -- -- 99 % --   06/27/20 0300 -- -- -- 77 77 15 96 % --   06/27/20 0017 111/85 97.7  F (36.5  C) Temporal -- 83 18 99 % 70.3 kg (155 lb)          Physical Exam  General: Appears well-developed and well-nourished.   Head: No signs of trauma.   CV: Normal rate and regular rhythm.    Resp: Effort normal and breath sounds normal. No respiratory distress.   GI:  Soft. There is no tenderness.  No rebound or guarding.  Normal bowel sounds.  .  MSK: Normal range of motion.   Neuro: The patient is alert and oriented.  Strength in upper/lower extremities normal and symmetrical. Sensation normal. Speech normal.  Skin: Skin is warm and dry. No rash noted.   Psych: overall cooperative, but upset.      Emergency Department Course       Interventions:   0223 Haldol 5 mg PO      Emergency Department Course:    Nursing notes and vitals reviewed.    I performed an exam of the patient as documented above.     A social work consult was ordered and DEC met with the patient.  Findings were discussed; please see note for details.     The patient was signed out to my oncoming partner, Dr. Grant.     Impression & Plan     Medical Decision Making:  John Juárez is a 54 year old gentleman who presents due to paranoia and agitation.  He has a history of schizophrenia and apparently is on a commitment at a group home.  He thought that the group home staff were stealing his Social Security number and using it to get money and benefits from him.  Apparently the group home ultimately called EMS and he was transferred to the ER.  On my evaluation, the patient did seem to be responding to internal stimuli.  Although he would get somewhat upset with the group home, he was not physically aggressive or agitated.  I did have DEC speak with the patient, and we are both in agreement that he does respond to internal stimuli but he does not seem to be a danger to himself at this time.  Unfortunately nobody was able to get a hold of anyone at the group home to get any collateral information.  It was decided to keep the patient in the ER until the morning when staff should be available to discuss disposition options.  Patient was signed out to Dr. Grant with further evaluation pending.  Patient was given his evening dose of Haldol.    Diagnosis:     ICD-10-CM    1. Cognitive dysfunction  F09    2.  Behavior involving running away  R46.89         Disposition:  Signed out to my partner Dr. Grant.     Scribe Disclosure:  I, Shanel Mo, am serving as a scribe at 12:30 AM on 6/27/2020 to document services personally performed by Aneesh Serrano MD based on my observations and the provider's statements to me.      Aneesh Serrano MD  06/29/20 0356

## 2020-06-27 NOTE — ED PROVIDER NOTES
6:22 AM: I received signout from Dr. Ochoa.  Briefly, patient presented from group home with paranoia.  He has a reported history of schizophrenia and it was felt that this is likely his baseline.  DEC assessed the patient but overnight team was unable to get a hold of the group home to safely discharge him from the ER.  Plan for DEC to call group home to obtain collateral information and safe disposition this morning.    6:41 AM: Assessed patient, he was awake and alert.  Awaiting DEC at this time.    9:28 AM: DEC spoke with group [place], and patient was reportedly sent in to ER due to concern that he was going to run away even though he did not actually run away.  They will be accepting of him today.  He will be sent home via wheelchair van.     Carter Grant MD  06/27/20 0920

## 2020-06-29 ENCOUNTER — HOSPITAL ENCOUNTER (EMERGENCY)
Facility: CLINIC | Age: 55
Discharge: HOME | End: 2020-06-29
Attending: EMERGENCY MEDICINE | Admitting: EMERGENCY MEDICINE
Payer: COMMERCIAL

## 2020-06-29 VITALS
SYSTOLIC BLOOD PRESSURE: 110 MMHG | HEART RATE: 84 BPM | TEMPERATURE: 98.2 F | RESPIRATION RATE: 18 BRPM | OXYGEN SATURATION: 99 % | DIASTOLIC BLOOD PRESSURE: 76 MMHG

## 2020-06-29 DIAGNOSIS — R46.89 BEHAVIOR INVOLVING RUNNING AWAY: ICD-10-CM

## 2020-06-29 LAB — GLUCOSE BLDC GLUCOMTR-MCNC: 70 MG/DL (ref 70–99)

## 2020-06-29 PROCEDURE — 00000146 ZZHCL STATISTIC GLUCOSE BY METER IP

## 2020-06-29 PROCEDURE — 25000132 ZZH RX MED GY IP 250 OP 250 PS 637: Performed by: EMERGENCY MEDICINE

## 2020-06-29 PROCEDURE — 99285 EMERGENCY DEPT VISIT HI MDM: CPT

## 2020-06-29 RX ORDER — HALOPERIDOL 5 MG/1
5 TABLET ORAL ONCE
Status: COMPLETED | OUTPATIENT
Start: 2020-06-29 | End: 2020-06-29

## 2020-06-29 RX ADMIN — HALOPERIDOL 5 MG: 5 TABLET ORAL at 02:15

## 2020-06-29 ASSESSMENT — ENCOUNTER SYMPTOMS
FEVER: 0
SHORTNESS OF BREATH: 0
SHORTNESS OF BREATH: 0
COUGH: 0
ABDOMINAL PAIN: 0
FEVER: 0
ABDOMINAL PAIN: 0

## 2020-06-29 NOTE — ED PROVIDER NOTES
History     Chief Complaint: Altered mental status    The history is provided by the EMS personnel.      John Juárez is a 54 year old male with a history of psychosis, schizophrenia, traumatic brain injury, and substance abuse who presents via EMS with altered mental status. EMS notes the patient walked out of his group home, [facility] in [place], and police were called, as he has a commitment order stating he must be taken here. They note he was hallucinating, speaking to himself, and punching himself. He denied alcohol or drug use to them, and his blood pressure was 102/63.    Allergies:  Ibuprofen    Medications:    Valium  Lipitor  Haldol  Ativan  Cogentin  Aspirin 81 mg    Past Medical History:    Schizophrenia  Heart disease  Hypertension  Substance abuse  Traumatic brain injury  Stroke  Chronic back pain  Coronary artery disease  Degenerative disc disease  Hepatitis C    Past Surgical History:    Picc placement  Liver surgery  Bilateral ankle surgery  Mitral valve replacement  Sternal rewiring  Mandible fracture surgery    Family History:    Heart attack (father)  Diabetes mellitus (mother)    Social History:  Smoking status: Yes, daily  Alcohol use: Sober since 2018  Drug use: Yes, history of cocaine abuse  PCP: Physician No Ref-Primary  Presents to the ED via EMS  Marital Status:  Single [1]    Review of Systems   Constitutional: Negative for fever.   Respiratory: Negative for cough and shortness of breath.    Cardiovascular: Negative for chest pain.   Gastrointestinal: Negative for abdominal pain.   All other systems reviewed and are negative.        Physical Exam     Patient Vitals for the past 24 hrs:   BP Temp Temp src Heart Rate Resp SpO2   06/29/20 0200 103/81 98.2  F (36.8  C) Oral 88 16 98 %     Physical Exam  General: Appears well-developed and well-nourished.   Head: No signs of trauma.   CV: Normal rate and regular rhythm.    Resp: Effort normal and breath sounds normal. No  respiratory distress.   GI: Soft. There is no tenderness.  No rebound or guarding.  Normal bowel sounds.    MSK: Normal range of motion. no edema. No Calf tenderness.  Neuro: The patient is alert and conversant.  Strength in upper/lower extremities normal and symmetrical. Sensation normal. Speech normal.  Skin: Skin is warm and dry. No rash noted.     Emergency Department Course     Procedures: None.    Interventions:  0215 Haldol 5 mg PO    Emergency Department Course:  Past medical records, nursing notes, and vitals reviewed.  0215: I performed an exam of the patient and obtained history, as documented above.    The patient was signed out to my colleague Dr. Black who will follow up on group home contact vs. DEC evaluation.     Impression & Plan      Medical Decision Making:  John Juárez is a 54-year-old gentleman who presents after wanting to leave his group home.  He apparently has a commitment and is not allowed to leave his group home so they called EMS to have him brought here.  Patient was seen a couple nights ago for the same complaints.  He was given his evening dose of Haldol.  We did try to contact the group home but they would not take him home at night.  Patient be signed out to Dr. Pepper with the plan to contact the group home to see if they will take him back in the morning versus DEC evaluation.    Critical Care time:  none    Diagnosis:    ICD-10-CM    1. Behavior involving running away  R46.89        Disposition: Moses Navarrete, am serving as a scribe at 2:08 AM on 6/29/2020 to document services personally performed by Aneesh Serrano MD based on my observations and the provider's statements to me.     Moses Townsend  6/29/2020    EMERGENCY DEPARTMENT       Aneesh Serrano MD  06/29/20 0641

## 2020-06-29 NOTE — ED TRIAGE NOTES
Walked out of group home, PD called because he has an order for commitment that states he must be taken to [facility]. Hallucinating, speaking to himself. Bizarre behavior, punching his hand. Denies alcohol or drug use. /63.

## 2020-06-29 NOTE — ED AVS SNAPSHOT
Emergency Department  [address] [place] [address]  Phone:  [phone]  Fax:  [phone]                                    John Juárez   MRN: [id number]    Department:   Emergency Department   Date of Visit:  6/29/2020           After Visit Summary Signature Page    I have received my discharge instructions, and my questions have been answered. I have discussed any challenges I see with this plan with the nurse or doctor.    ..........................................................................................................................................  Patient/Patient Representative Signature      ..........................................................................................................................................  Patient Representative Print Name and Relationship to Patient    ..................................................               ................................................  Date                                   Time    ..........................................................................................................................................  Reviewed by Signature/Title    ...................................................              ..............................................  Date                                               Time          22EPIC Rev 08/18

## 2020-06-29 NOTE — ED NOTES
Pt's group home facility according to past notes:  [facility] [address]  [phone]  Fax [phone]

## 2020-06-29 NOTE — ED PROVIDER NOTES
Sign out received from Dr. Serrano.  Briefly, 54yoM with history of psychosis, schizophrenia, TBI who was brought in to ED after attempting to leave .  Per care plan, 911 was called and patient brought to ED for eval.  He has been cooperative in the ED overnight.  Medically cleared on previous shift.  Plan is to contact  in the morning, discuss whether patient can return.    9:43 AM  Group home accepts the patient back for transfer today.  Awaiting wheelchair van.  Patient reevaluated, he is alert and calm.  He is requesting a cigarette, but will wait until he returns to the group home.  No other complaints.     Laney Black MD  06/30/20 2480

## 2020-06-29 NOTE — ED NOTES
Bed: ED16  Expected date:   Expected time:   Means of arrival:   Comments:  422 26m AMS on hold no covid eta 7

## 2020-07-11 ENCOUNTER — HOSPITAL ENCOUNTER (EMERGENCY)
Facility: CLINIC | Age: 55
Discharge: HOME | End: 2020-07-12
Attending: EMERGENCY MEDICINE | Admitting: EMERGENCY MEDICINE
Payer: COMMERCIAL

## 2020-07-11 DIAGNOSIS — K56.41 FECAL IMPACTION IN RECTUM (H): ICD-10-CM

## 2020-07-11 DIAGNOSIS — K59.00 CONSTIPATION, UNSPECIFIED CONSTIPATION TYPE: ICD-10-CM

## 2020-07-11 PROCEDURE — 25000132 ZZH RX MED GY IP 250 OP 250 PS 637: Performed by: EMERGENCY MEDICINE

## 2020-07-11 PROCEDURE — 99283 EMERGENCY DEPT VISIT LOW MDM: CPT

## 2020-07-11 RX ORDER — MAGNESIUM CARB/ALUMINUM HYDROX 105-160MG
296 TABLET,CHEWABLE ORAL ONCE
Status: COMPLETED | OUTPATIENT
Start: 2020-07-11 | End: 2020-07-11

## 2020-07-11 RX ADMIN — MAGNESIUM CITRATE 296 ML: 1.75 LIQUID ORAL at 23:54

## 2020-07-11 ASSESSMENT — MIFFLIN-ST. JEOR: SCORE: 1566.54

## 2020-07-11 ASSESSMENT — ENCOUNTER SYMPTOMS
ABDOMINAL PAIN: 1
SLEEP DISTURBANCE: 1
CONSTIPATION: 1

## 2020-07-11 NOTE — ED AVS SNAPSHOT
Emergency Department  [address] [place] [address]  Phone:  [phone]  Fax:  [phone]                                    John Juárez   MRN: [id number]    Department:   Emergency Department   Date of Visit:  7/11/2020           After Visit Summary Signature Page    I have received my discharge instructions, and my questions have been answered. I have discussed any challenges I see with this plan with the nurse or doctor.    ..........................................................................................................................................  Patient/Patient Representative Signature      ..........................................................................................................................................  Patient Representative Print Name and Relationship to Patient    ..................................................               ................................................  Date                                   Time    ..........................................................................................................................................  Reviewed by Signature/Title    ...................................................              ..............................................  Date                                               Time          22EPIC Rev 08/18

## 2020-07-12 VITALS
RESPIRATION RATE: 20 BRPM | HEART RATE: 101 BPM | DIASTOLIC BLOOD PRESSURE: 74 MMHG | SYSTOLIC BLOOD PRESSURE: 114 MMHG | BODY MASS INDEX: 20.86 KG/M2 | OXYGEN SATURATION: 100 % | WEIGHT: 154 LBS | HEIGHT: 72 IN | TEMPERATURE: 97.8 F

## 2020-07-12 NOTE — ED TRIAGE NOTES
Patient states having constipation.  Tried to reach in & pull out what he could.  Started a new medication also.

## 2020-07-12 NOTE — ED PROVIDER NOTES
History     Chief Complaint:  Constipation      The history is provided by the patient.      John Juárez is a 54 year old male with a history of alcohol abuse, psychosis, heart failure, TBI, and hypertension who presents for evaluation of constipation. The patient has been constipated for the past week. The last time any poop came out was one week ago. He also reports associated slight abdominal pain. He recently started a new medication. He has not taken any over the counter pills or laxatives. He reached his finger into his rectum this morning and pulled a little bit out. He also complains of insomnia and not sleeping for the last 6 months.     Allergies:  Ibuprofen  Latex    Medications:    Aspirin 81 mg  Atorvastatin   Benztropine   Haloperidol   Levothyroxine   Lorazepam  Salmeterol inhaler  Vitamin D3    Past Medical History:    Alcoholism   Alcohol induced chronic pancreatitis   Alcohol induced liver disorder   Aortic valve insufficiency  Aspiration pneumonia   Basal ganglion stroke   COPD  Coronary artery disease   Endocarditis   Heart failure with preserved ejection fraction   Hypertension   Hypothyroidism   Infectious endocarditis   Mitral incompetence  MRSA   Prediabetes   Psychosis   Subarachnoid bleed   Substance abuse  Tobacco abuse   Traumatic brain injury     Past Surgical History:    Ankle fracture surgery   Aortic valve replacement  Appendectomy   Liver surgery  Mandible fracture surgery   Mitral valve replacement  Upper GI endoscopy     Family History:    Cancer   Cardiovascular disease  Diabetes   Myocardial infarction  Stroke     Social History:  Marital Status:  Single [1]  Positive for tobacco use. Current everyday  Negative for smokeless tobacco use.  Positive for alcohol use.  History of alcohol abuse  Positive for drug use. History of substance abuse    Review of Systems   Gastrointestinal: Positive for abdominal pain and constipation.   Psychiatric/Behavioral: Positive for sleep  disturbance.   All other systems reviewed and are negative.      Physical Exam     Patient Vitals for the past 24 hrs:   BP Temp Temp src Pulse Heart Rate Resp SpO2 Height Weight   07/12/20 0036 114/74 -- -- -- 89 20 100 % -- --   07/11/20 2315 113/85 97.8  F (36.6  C) Oral 101 101 14 99 % 1.829 m (6') 69.9 kg (154 lb)       Physical Exam  Eye:  Pupils are equal, round, and reactive.  Extraocular movements intact.    ENT:  No rhinorrhea.  Moist mucus membranes.  Normal tongue and tonsil.    Cardiac:  Regular rate and rhythm.  No murmurs, gallops, or rubs.    Pulmonary:  Clear to auscultation bilaterally.  No wheezes, rales, or rhonchi.    Abdomen:  Mild tenderness to the left lower abdomen without mass, rebound, guarding. Rectal exam shows copious hard stool throughout the vault without bleeding.    Musculoskeletal:  Normal movement of all extremities without evidence for deficit.    Skin:  Warm and dry without rashes.    Neurologic:  Non-focal exam without asymmetric weakness or numbness.     Psychiatric:  Normal affect with appropriate interaction with examiner.    Emergency Department Course     Procedures:  None    Interventions:  2354 Magnesium citrate solution, 296 ml, PO    Emergency Department Course:  Past medical records, nursing notes, and vitals reviewed.    2325 I performed an exam of the patient as documented above.     Findings and plan explained to the Patient. Patient discharged home with instructions regarding supportive care, medications, and reasons to return. The importance of close follow-up was reviewed.     I personally answered all related questions prior to discharge.     Impression & Plan     Medical Decision Making:  This 54-year-old man presents from his group home with constipation.  He has had minimal stool output over the past week with a personal digital disimpaction earlier today with minimal output.  He denies having any significant abdominal pain, but is concerned that he cannot  pass the stool that he feels is there.    On my exam, he appears clinically well.  His vital signs are reassuring.  His abdomen is soft and benign.  However, his rectal exam shows significant stool in the rectal vault.  I personally digitally disimpacted him with copious output of stool.  With this, he felt much improved.  I do not believe that he requires further work-up at this juncture.  Plan will be for discharge home with magnesium citrate and recommendations to initiate taking daily MiraLAX to make sure that he is stooling appropriately.  He will otherwise return to us for any worsening of condition or other emergent concerns.      Diagnosis:    ICD-10-CM    1. Constipation, unspecified constipation type  K59.00    2. Fecal impaction in rectum (H)  K56.41        Disposition:  Discharged to home.    Scribe Disclosure:  I, Shelia Addy, am serving as a scribe at 11:25 PM on 7/11/2020 to document services personally performed by Trierweiler, Chad A, MD based on my observations and the provider's statements to me.      EMERGENCY DEPARTMENT       Trierweiler, Chad A, MD  07/12/20 2198

## 2020-08-26 ENCOUNTER — HOSPITAL ENCOUNTER (EMERGENCY)
Facility: CLINIC | Age: 55
Discharge: HOME | End: 2020-08-26
Attending: EMERGENCY MEDICINE | Admitting: EMERGENCY MEDICINE
Payer: COMMERCIAL

## 2020-08-26 VITALS
WEIGHT: 155 LBS | OXYGEN SATURATION: 98 % | TEMPERATURE: 98.2 F | BODY MASS INDEX: 20.99 KG/M2 | RESPIRATION RATE: 16 BRPM | HEIGHT: 72 IN | HEART RATE: 93 BPM | SYSTOLIC BLOOD PRESSURE: 99 MMHG | DIASTOLIC BLOOD PRESSURE: 69 MMHG

## 2020-08-26 DIAGNOSIS — R46.89 AGGRESSIVE BEHAVIOR: ICD-10-CM

## 2020-08-26 PROCEDURE — 99285 EMERGENCY DEPT VISIT HI MDM: CPT | Mod: 25

## 2020-08-26 PROCEDURE — 25000132 ZZH RX MED GY IP 250 OP 250 PS 637: Performed by: EMERGENCY MEDICINE

## 2020-08-26 PROCEDURE — 90791 PSYCH DIAGNOSTIC EVALUATION: CPT

## 2020-08-26 RX ORDER — HALOPERIDOL 0.5 MG/1
2 TABLET ORAL AT BEDTIME
Status: DISCONTINUED | OUTPATIENT
Start: 2020-08-26 | End: 2020-08-26

## 2020-08-26 RX ORDER — HALOPERIDOL 5 MG/1
5 TABLET ORAL AT BEDTIME
Status: DISCONTINUED | OUTPATIENT
Start: 2020-08-26 | End: 2020-08-26

## 2020-08-26 RX ORDER — NICOTINE 21 MG/24HR
1 PATCH, TRANSDERMAL 24 HOURS TRANSDERMAL
Status: DISCONTINUED | OUTPATIENT
Start: 2020-08-26 | End: 2020-08-27 | Stop reason: HOSPADM

## 2020-08-26 RX ADMIN — HALOPERIDOL 7 MG: 5 TABLET ORAL at 21:23

## 2020-08-26 ASSESSMENT — MIFFLIN-ST. JEOR: SCORE: 1571.08
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
## 2020-08-26 NOTE — ED NOTES
Bed: BH3  Expected date:   Expected time:   Means of arrival:   Comments:  [facility] - 449 - 54 psych eval no covid eta now

## 2020-08-26 NOTE — ED AVS SNAPSHOT
Emergency Department  [address] [place] [address]  Phone:  [phone]  Fax:  [phone]                                    John Juárez   MRN: [id number]    Department:   Emergency Department   Date of Visit:  8/26/2020           After Visit Summary Signature Page    I have received my discharge instructions, and my questions have been answered. I have discussed any challenges I see with this plan with the nurse or doctor.    ..........................................................................................................................................  Patient/Patient Representative Signature      ..........................................................................................................................................  Patient Representative Print Name and Relationship to Patient    ..................................................               ................................................  Date                                   Time    ..........................................................................................................................................  Reviewed by Signature/Title    ...................................................              ..............................................  Date                                               Time          22EPIC Rev 08/18

## 2020-08-27 NOTE — ED NOTES
"Explained to pt that the group home is not answering the phone and there for we are not able to send him back to the group home at this time. \"I want to go home right now\" \"they are probably packing up my clothes\" pt walks out of the room \"I want the fuck out of here\" \"open the fucking door and let me out of here\" security present. Explained to pt again that we are unable to send him back to the group home due to not answering the phone. \"get the fuck away from me\" \"leave me the fuck alone\" pt goes back in his room and sits on the bed.   "
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
## 2020-08-27 NOTE — ED NOTES
Assumed care of patient: patient resting on stretcher waiting for [facility] transport back to his group home

## 2020-08-27 NOTE — ED NOTES
[facility] wheelchair transport here to transport patient back to his group home. Pt given discharge instructions

## 2020-08-27 NOTE — ED NOTES
Called [facility] [place] home. [phone] left a message for pt to be discharge

## 2020-09-09 ENCOUNTER — HOSPITAL ENCOUNTER (INPATIENT)
Facility: CLINIC | Age: 55
LOS: 284 days | Discharge: PSYCHIATRIC OR SUBSTANCE USE FACILITY | DRG: 884 | End: 2021-06-30
Attending: EMERGENCY MEDICINE | Admitting: INTERNAL MEDICINE
Payer: COMMERCIAL

## 2020-09-09 DIAGNOSIS — F29 PSYCHOSIS, UNSPECIFIED PSYCHOSIS TYPE (H): ICD-10-CM

## 2020-09-09 DIAGNOSIS — E03.9 ACQUIRED HYPOTHYROIDISM: Primary | ICD-10-CM

## 2020-09-09 DIAGNOSIS — F17.200 SMOKER: ICD-10-CM

## 2020-09-09 DIAGNOSIS — E55.9 VITAMIN D DEFICIENCY: ICD-10-CM

## 2020-09-09 DIAGNOSIS — E78.5 HYPERLIPIDEMIA, UNSPECIFIED HYPERLIPIDEMIA TYPE: ICD-10-CM

## 2020-09-09 DIAGNOSIS — F20.9 SCHIZOPHRENIA, UNSPECIFIED TYPE (H): ICD-10-CM

## 2020-09-09 DIAGNOSIS — J42 CHRONIC BRONCHITIS, UNSPECIFIED CHRONIC BRONCHITIS TYPE (H): ICD-10-CM

## 2020-09-09 DIAGNOSIS — F03.91 DEMENTIA WITH BEHAVIORAL DISTURBANCE, UNSPECIFIED DEMENTIA TYPE: ICD-10-CM

## 2020-09-09 PROBLEM — I10 HTN (HYPERTENSION): Status: ACTIVE | Noted: 2020-09-09

## 2020-09-09 LAB
ALBUMIN SERPL-MCNC: 3.4 G/DL (ref 3.4–5)
ALBUMIN UR-MCNC: NEGATIVE MG/DL
ALP SERPL-CCNC: 99 U/L (ref 40–150)
ALT SERPL W P-5'-P-CCNC: 36 U/L (ref 0–70)
AMPHETAMINES UR QL SCN: NEGATIVE
ANION GAP SERPL CALCULATED.3IONS-SCNC: 1 MMOL/L (ref 3–14)
APPEARANCE UR: CLEAR
AST SERPL W P-5'-P-CCNC: 44 U/L (ref 0–45)
BARBITURATES UR QL: NEGATIVE
BASOPHILS # BLD AUTO: 0.1 10E9/L (ref 0–0.2)
BASOPHILS NFR BLD AUTO: 1.1 %
BENZODIAZ UR QL: NEGATIVE
BILIRUB SERPL-MCNC: 0.3 MG/DL (ref 0.2–1.3)
BILIRUB UR QL STRIP: NEGATIVE
BUN SERPL-MCNC: 12 MG/DL (ref 7–30)
CALCIUM SERPL-MCNC: 8.3 MG/DL (ref 8.5–10.1)
CANNABINOIDS UR QL SCN: NEGATIVE
CHLORIDE SERPL-SCNC: 111 MMOL/L (ref 94–109)
CO2 SERPL-SCNC: 25 MMOL/L (ref 20–32)
COCAINE UR QL: NEGATIVE
COLOR UR AUTO: NORMAL
CREAT SERPL-MCNC: 0.67 MG/DL (ref 0.66–1.25)
DIFFERENTIAL METHOD BLD: ABNORMAL
EOSINOPHIL # BLD AUTO: 1.4 10E9/L (ref 0–0.7)
EOSINOPHIL NFR BLD AUTO: 23.3 %
ERYTHROCYTE [DISTWIDTH] IN BLOOD BY AUTOMATED COUNT: 14 % (ref 10–15)
GFR SERPL CREATININE-BSD FRML MDRD: >90 ML/MIN/{1.73_M2}
GLUCOSE SERPL-MCNC: 97 MG/DL (ref 70–99)
GLUCOSE UR STRIP-MCNC: NEGATIVE MG/DL
HCT VFR BLD AUTO: 44.6 % (ref 40–53)
HGB BLD-MCNC: 16.3 G/DL (ref 13.3–17.7)
HGB UR QL STRIP: NEGATIVE
IMM GRANULOCYTES # BLD: 0 10E9/L (ref 0–0.4)
IMM GRANULOCYTES NFR BLD: 0.2 %
KETONES UR STRIP-MCNC: NEGATIVE MG/DL
LEUKOCYTE ESTERASE UR QL STRIP: NEGATIVE
LYMPHOCYTES # BLD AUTO: 1.3 10E9/L (ref 0.8–5.3)
LYMPHOCYTES NFR BLD AUTO: 20.7 %
MCH RBC QN AUTO: 34 PG (ref 26.5–33)
MCHC RBC AUTO-ENTMCNC: 36.5 G/DL (ref 31.5–36.5)
MCV RBC AUTO: 93 FL (ref 78–100)
MONOCYTES # BLD AUTO: 0.4 10E9/L (ref 0–1.3)
MONOCYTES NFR BLD AUTO: 7 %
NEUTROPHILS # BLD AUTO: 2.9 10E9/L (ref 1.6–8.3)
NEUTROPHILS NFR BLD AUTO: 47.7 %
NITRATE UR QL: NEGATIVE
NRBC # BLD AUTO: 0 10*3/UL
NRBC BLD AUTO-RTO: 0 /100
OPIATES UR QL SCN: NEGATIVE
PCP UR QL SCN: NEGATIVE
PH UR STRIP: 5.5 PH (ref 5–7)
PLATELET # BLD AUTO: 154 10E9/L (ref 150–450)
POTASSIUM SERPL-SCNC: 4 MMOL/L (ref 3.4–5.3)
PROT SERPL-MCNC: 7.6 G/DL (ref 6.8–8.8)
RBC # BLD AUTO: 4.79 10E12/L (ref 4.4–5.9)
RBC #/AREA URNS AUTO: <1 /HPF (ref 0–2)
SODIUM SERPL-SCNC: 137 MMOL/L (ref 133–144)
SOURCE: NORMAL
SP GR UR STRIP: 1.01 (ref 1–1.03)
TSH SERPL DL<=0.005 MIU/L-ACNC: 3.18 MU/L (ref 0.4–4)
UROBILINOGEN UR STRIP-MCNC: NORMAL MG/DL (ref 0–2)
WBC # BLD AUTO: 6.2 10E9/L (ref 4–11)
WBC #/AREA URNS AUTO: 0 /HPF (ref 0–5)

## 2020-09-09 PROCEDURE — 84443 ASSAY THYROID STIM HORMONE: CPT | Performed by: EMERGENCY MEDICINE

## 2020-09-09 PROCEDURE — 80307 DRUG TEST PRSMV CHEM ANLYZR: CPT | Performed by: EMERGENCY MEDICINE

## 2020-09-09 PROCEDURE — G0378 HOSPITAL OBSERVATION PER HR: HCPCS

## 2020-09-09 PROCEDURE — 25000132 ZZH RX MED GY IP 250 OP 250 PS 637: Performed by: INTERNAL MEDICINE

## 2020-09-09 PROCEDURE — 99285 EMERGENCY DEPT VISIT HI MDM: CPT | Mod: 25

## 2020-09-09 PROCEDURE — U0003 INFECTIOUS AGENT DETECTION BY NUCLEIC ACID (DNA OR RNA); SEVERE ACUTE RESPIRATORY SYNDROME CORONAVIRUS 2 (SARS-COV-2) (CORONAVIRUS DISEASE [COVID-19]), AMPLIFIED PROBE TECHNIQUE, MAKING USE OF HIGH THROUGHPUT TECHNOLOGIES AS DESCRIBED BY CMS-2020-01-R: HCPCS | Performed by: EMERGENCY MEDICINE

## 2020-09-09 PROCEDURE — 99222 1ST HOSP IP/OBS MODERATE 55: CPT | Mod: AI | Performed by: INTERNAL MEDICINE

## 2020-09-09 PROCEDURE — 90791 PSYCH DIAGNOSTIC EVALUATION: CPT

## 2020-09-09 PROCEDURE — C9803 HOPD COVID-19 SPEC COLLECT: HCPCS

## 2020-09-09 PROCEDURE — 81001 URINALYSIS AUTO W/SCOPE: CPT | Performed by: EMERGENCY MEDICINE

## 2020-09-09 PROCEDURE — 99207 ZZC CDG-CODE CATEGORY CHANGED: CPT | Performed by: INTERNAL MEDICINE

## 2020-09-09 PROCEDURE — 80053 COMPREHEN METABOLIC PANEL: CPT | Performed by: EMERGENCY MEDICINE

## 2020-09-09 PROCEDURE — 25000132 ZZH RX MED GY IP 250 OP 250 PS 637: Performed by: EMERGENCY MEDICINE

## 2020-09-09 PROCEDURE — 85025 COMPLETE CBC W/AUTO DIFF WBC: CPT | Performed by: EMERGENCY MEDICINE

## 2020-09-09 RX ORDER — ALBUTEROL SULFATE 90 UG/1
1-2 AEROSOL, METERED RESPIRATORY (INHALATION) EVERY 4 HOURS PRN
Status: ON HOLD | COMMUNITY
End: 2020-12-05

## 2020-09-09 RX ORDER — LORAZEPAM 1 MG/1
1 TABLET ORAL 2 TIMES DAILY PRN
Status: ON HOLD | COMMUNITY
End: 2020-12-05

## 2020-09-09 RX ORDER — HALOPERIDOL 5 MG/1
5 TABLET ORAL EVERY 6 HOURS PRN
Status: DISCONTINUED | OUTPATIENT
Start: 2020-09-09 | End: 2021-05-10

## 2020-09-09 RX ORDER — MAGNESIUM HYDROXIDE/ALUMINUM HYDROXICE/SIMETHICONE 120; 1200; 1200 MG/30ML; MG/30ML; MG/30ML
30 SUSPENSION ORAL EVERY 4 HOURS PRN
Status: ON HOLD | COMMUNITY
End: 2020-12-05

## 2020-09-09 RX ORDER — ACETAMINOPHEN 325 MG/1
650 TABLET ORAL EVERY 4 HOURS PRN
Status: ON HOLD | COMMUNITY
End: 2020-12-05

## 2020-09-09 RX ORDER — VENLAFAXINE 37.5 MG/1
75 TABLET ORAL EVERY MORNING
Status: ON HOLD | COMMUNITY
End: 2020-09-10

## 2020-09-09 RX ORDER — OLANZAPINE 5 MG/1
10 TABLET, ORALLY DISINTEGRATING ORAL AT BEDTIME
Status: DISCONTINUED | OUTPATIENT
Start: 2020-09-09 | End: 2020-09-10

## 2020-09-09 RX ORDER — NALOXONE HYDROCHLORIDE 0.4 MG/ML
.1-.4 INJECTION, SOLUTION INTRAMUSCULAR; INTRAVENOUS; SUBCUTANEOUS
Status: DISCONTINUED | OUTPATIENT
Start: 2020-09-09 | End: 2020-11-24

## 2020-09-09 RX ORDER — DOCUSATE SODIUM 100 MG/1
100 CAPSULE, LIQUID FILLED ORAL 2 TIMES DAILY
Status: ON HOLD | COMMUNITY
End: 2020-12-05

## 2020-09-09 RX ORDER — BENZTROPINE MESYLATE 0.5 MG/1
0.5 TABLET ORAL ONCE
Status: COMPLETED | OUTPATIENT
Start: 2020-09-09 | End: 2020-09-09

## 2020-09-09 RX ORDER — NICOTINE 21 MG/24HR
1 PATCH, TRANSDERMAL 24 HOURS TRANSDERMAL DAILY
Status: DISCONTINUED | OUTPATIENT
Start: 2020-09-09 | End: 2021-01-01

## 2020-09-09 RX ORDER — ONDANSETRON 2 MG/ML
4 INJECTION INTRAMUSCULAR; INTRAVENOUS EVERY 6 HOURS PRN
Status: DISCONTINUED | OUTPATIENT
Start: 2020-09-09 | End: 2021-06-30 | Stop reason: HOSPADM

## 2020-09-09 RX ORDER — LOPERAMIDE HYDROCHLORIDE 2 MG/1
2 TABLET ORAL 4 TIMES DAILY PRN
Status: ON HOLD | COMMUNITY
End: 2020-12-05

## 2020-09-09 RX ORDER — ONDANSETRON 4 MG/1
4 TABLET, ORALLY DISINTEGRATING ORAL EVERY 6 HOURS PRN
Status: DISCONTINUED | OUTPATIENT
Start: 2020-09-09 | End: 2021-06-30 | Stop reason: HOSPADM

## 2020-09-09 RX ORDER — BENZTROPINE MESYLATE 0.5 MG/1
0.5 TABLET ORAL AT BEDTIME
Status: ON HOLD | COMMUNITY
End: 2020-12-05

## 2020-09-09 RX ORDER — ACETAMINOPHEN 325 MG/1
650 TABLET ORAL EVERY 4 HOURS PRN
Status: DISCONTINUED | OUTPATIENT
Start: 2020-09-09 | End: 2021-06-30 | Stop reason: HOSPADM

## 2020-09-09 RX ORDER — OLANZAPINE 10 MG/2ML
10 INJECTION, POWDER, FOR SOLUTION INTRAMUSCULAR DAILY PRN
Status: DISCONTINUED | OUTPATIENT
Start: 2020-09-09 | End: 2021-05-24

## 2020-09-09 RX ORDER — LORAZEPAM 1 MG/1
1 TABLET ORAL EVERY 6 HOURS PRN
Status: COMPLETED | OUTPATIENT
Start: 2020-09-09 | End: 2020-09-09

## 2020-09-09 RX ORDER — MEMANTINE HYDROCHLORIDE 5 MG/1
5 TABLET ORAL DAILY
Status: ON HOLD | COMMUNITY
End: 2020-12-05

## 2020-09-09 RX ORDER — OLANZAPINE 20 MG/1
20 TABLET ORAL DAILY
Status: ON HOLD | COMMUNITY
End: 2020-12-05

## 2020-09-09 RX ORDER — POLYETHYLENE GLYCOL 3350 17 G/17G
1 POWDER, FOR SOLUTION ORAL DAILY
Status: ON HOLD | COMMUNITY
End: 2020-12-05

## 2020-09-09 RX ORDER — LORAZEPAM 1 MG/1
1 TABLET ORAL
Status: COMPLETED | OUTPATIENT
Start: 2020-09-09 | End: 2020-09-09

## 2020-09-09 RX ADMIN — HALOPERIDOL 7 MG: 5 TABLET ORAL at 08:31

## 2020-09-09 RX ADMIN — LORAZEPAM 1 MG: 1 TABLET ORAL at 14:47

## 2020-09-09 RX ADMIN — HALOPERIDOL 5 MG: 5 TABLET ORAL at 14:47

## 2020-09-09 RX ADMIN — BENZTROPINE MESYLATE 0.5 MG: 0.5 TABLET ORAL at 08:31

## 2020-09-09 RX ADMIN — LORAZEPAM 1 MG: 1 TABLET ORAL at 08:31

## 2020-09-09 ASSESSMENT — ENCOUNTER SYMPTOMS: AGITATION: 1

## 2020-09-09 ASSESSMENT — MIFFLIN-ST. JEOR: SCORE: 1568

## 2020-09-09 NOTE — ED NOTES
Bed: 2  Expected date:   Expected time:   Means of arrival:   Comments:  434  54M Left group home/Vulnerable adult  2637

## 2020-09-09 NOTE — ED NOTES
Report received. Assumed care of pt. Pt is voluntary per previous RN. Pt wearing scrubs with belongings secured by previous staff in ED locker. Dr. Jiménez at bedside.

## 2020-09-09 NOTE — ED NOTES
Pt ambulated to bathroom independently with steady gait. Urine sample obtained. Pt given remote to watch TV.
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
## 2020-09-09 NOTE — PHARMACY-ADMISSION MEDICATION HISTORY
Pharmacy Medication History  Admission medication history interview status for the 9/9/2020  admission is complete. See EPIC admission navigator for prior to admission medications     Medication history sources: Obtained medication list from RN at [address].    Medication history source reliability: Moderate  Adherence assessment: Moderate      Medication reconciliation completed by provider prior to medication history? No    Time spent in this activity: 30 minutes      Prior to Admission medications    Medication Sig Last Dose Taking? Auth Provider   acetaminophen (TYLENOL) 325 MG tablet Take 650 mg by mouth every 4 hours as needed for mild pain  Yes Unknown, Entered By History   albuterol (PROAIR HFA/PROVENTIL HFA/VENTOLIN HFA) 108 (90 Base) MCG/ACT inhaler Inhale 1-2 puffs into the lungs every 4 hours as needed for shortness of breath / dyspnea or wheezing  Yes Unknown, Entered By History   alum & mag hydroxide-simethicone (MAALOX) 200-200-20 MG/5ML SUSP suspension Take 30 mLs by mouth every 4 hours as needed for indigestion  Yes Unknown, Entered By History   aspirin 81 MG EC tablet Take 81 mg by mouth daily 9/8/2020 Yes Unknown, Entered By History   atorvastatin (LIPITOR) 80 MG tablet Take 80 mg by mouth At Bedtime 9/8/2020 Yes Unknown, Entered By History   benztropine (COGENTIN) 0.5 MG tablet Take 0.5 mg by mouth At Bedtime 9/8/2020 Yes Unknown, Entered By History   docusate sodium (COLACE) 100 MG capsule Take 100 mg by mouth 2 times daily 9/8/2020 Yes Unknown, Entered By History   levothyroxine (SYNTHROID/LEVOTHROID) 88 MCG tablet Take 88 mcg by mouth daily 9/8/2020 Yes Unknown, Entered By History   loperamide (IMODIUM A-D) 2 MG tablet Take 2 mg by mouth 4 times daily as needed for diarrhea  Yes Unknown, Entered By History   LORazepam (ATIVAN) 1 MG tablet Take 1 mg by mouth 2 times daily as needed (Aggression, agitation, and/or violence)  Yes Unknown, Entered By History   memantine  (NAMENDA) 5 MG tablet Take 5 mg by mouth daily 9/8/2020 Yes Unknown, Entered By History   OLANZapine (ZYPREXA) 20 MG tablet Take 20 mg by mouth daily 9/8/2020 Yes Unknown, Entered By History   polyethylene glycol (MIRALAX) 17 g packet Take 1 packet by mouth daily 9/8/2020 Yes Unknown, Entered By History   salmeterol (SEREVENT) 50 MCG/DOSE inhaler Inhale 1 puff into the lungs 2 times daily 9/8/2020 Yes Unknown, Entered By History   venlafaxine (EFFEXOR XR) 37.5 MG tablet Take 75 mg by mouth every morning 9/8/2020 Yes Unknown, Entered By History   vitamin D3 (CHOLECALCIFEROL) 2000 units (50 mcg) tablet Take 1 tablet by mouth daily 9/8/2020 Yes Unknown, Entered By History     Edith Brink, PharmD, BCPS

## 2020-09-09 NOTE — ED NOTES
"Pt states he won't go back to his group home because \"they're taking all my money away from me\". Pt states he wants to go home. When asked where home is, pt states he has homes \"all over the place\". Dr. Jiménez informed. Pt given hot breakfast tray.  "
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
## 2020-09-09 NOTE — ED NOTES
Report received. Patient visualized. Patient continues to ask what he is doing here, when he can go home and where he is. Patients questions have  Been repeatedly answered. Patient is redirectable and has returned to room, is resting in bed with TV on.
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
## 2020-09-09 NOTE — ED NOTES
Pt states he wants to go outside and smoke. Informed pt this was a non-smoking hospital and offered nicotine patch or gum, but pt declined.
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
## 2020-09-10 PROBLEM — F29 PSYCHOSIS (H): Status: RESOLVED | Noted: 2020-01-21 | Resolved: 2020-09-10

## 2020-09-10 PROBLEM — F17.200 SMOKER: Status: ACTIVE | Noted: 2020-09-10

## 2020-09-10 PROCEDURE — 99232 SBSQ HOSP IP/OBS MODERATE 35: CPT | Performed by: INTERNAL MEDICINE

## 2020-09-10 PROCEDURE — 99207 ZZC CDG-CODE CATEGORY CHANGED: CPT | Performed by: INTERNAL MEDICINE

## 2020-09-10 PROCEDURE — 99221 1ST HOSP IP/OBS SF/LOW 40: CPT | Performed by: PSYCHIATRY & NEUROLOGY

## 2020-09-10 PROCEDURE — 25000132 ZZH RX MED GY IP 250 OP 250 PS 637: Performed by: INTERNAL MEDICINE

## 2020-09-10 PROCEDURE — G0378 HOSPITAL OBSERVATION PER HR: HCPCS

## 2020-09-10 RX ORDER — VENLAFAXINE 75 MG/1
75 TABLET ORAL EVERY MORNING
Status: DISCONTINUED | OUTPATIENT
Start: 2020-09-10 | End: 2020-09-10

## 2020-09-10 RX ORDER — POLYETHYLENE GLYCOL 3350 17 G/17G
17 POWDER, FOR SOLUTION ORAL DAILY
Status: DISCONTINUED | OUTPATIENT
Start: 2020-09-10 | End: 2021-01-06

## 2020-09-10 RX ORDER — ALBUTEROL SULFATE 90 UG/1
2 AEROSOL, METERED RESPIRATORY (INHALATION) EVERY 4 HOURS PRN
Status: DISCONTINUED | OUTPATIENT
Start: 2020-09-10 | End: 2021-06-30 | Stop reason: HOSPADM

## 2020-09-10 RX ORDER — BENZTROPINE MESYLATE 0.5 MG/1
0.5 TABLET ORAL AT BEDTIME
Status: DISCONTINUED | OUTPATIENT
Start: 2020-09-10 | End: 2020-09-20 | Stop reason: DRUGHIGH

## 2020-09-10 RX ORDER — ATORVASTATIN CALCIUM 40 MG/1
80 TABLET, FILM COATED ORAL AT BEDTIME
Status: DISCONTINUED | OUTPATIENT
Start: 2020-09-10 | End: 2021-06-30 | Stop reason: HOSPADM

## 2020-09-10 RX ORDER — ASPIRIN 81 MG/1
81 TABLET ORAL DAILY
Status: DISCONTINUED | OUTPATIENT
Start: 2020-09-10 | End: 2021-06-30 | Stop reason: HOSPADM

## 2020-09-10 RX ORDER — VENLAFAXINE HYDROCHLORIDE 75 MG/1
75 CAPSULE, EXTENDED RELEASE ORAL
Status: DISCONTINUED | OUTPATIENT
Start: 2020-09-10 | End: 2021-05-10

## 2020-09-10 RX ORDER — LEVOTHYROXINE SODIUM 88 UG/1
88 TABLET ORAL DAILY
Status: DISCONTINUED | OUTPATIENT
Start: 2020-09-10 | End: 2021-06-30 | Stop reason: HOSPADM

## 2020-09-10 RX ORDER — MEMANTINE HYDROCHLORIDE 5 MG/1
5 TABLET ORAL DAILY
Status: DISCONTINUED | OUTPATIENT
Start: 2020-09-10 | End: 2021-06-30 | Stop reason: HOSPADM

## 2020-09-10 RX ORDER — DOCUSATE SODIUM 100 MG/1
100 CAPSULE, LIQUID FILLED ORAL 2 TIMES DAILY
Status: DISCONTINUED | OUTPATIENT
Start: 2020-09-10 | End: 2021-06-30 | Stop reason: HOSPADM

## 2020-09-10 RX ORDER — OLANZAPINE 10 MG/1
20 TABLET ORAL DAILY
Status: DISCONTINUED | OUTPATIENT
Start: 2020-09-10 | End: 2020-09-18

## 2020-09-10 RX ORDER — VENLAFAXINE HYDROCHLORIDE 37.5 MG/1
75 CAPSULE, EXTENDED RELEASE ORAL DAILY
Status: ON HOLD | COMMUNITY
End: 2020-12-05

## 2020-09-10 RX ORDER — OLANZAPINE 5 MG/1
10 TABLET, ORALLY DISINTEGRATING ORAL 2 TIMES DAILY PRN
Status: DISCONTINUED | OUTPATIENT
Start: 2020-09-10 | End: 2020-12-23

## 2020-09-10 RX ORDER — ALUMINA, MAGNESIA, AND SIMETHICONE 2400; 2400; 240 MG/30ML; MG/30ML; MG/30ML
30 SUSPENSION ORAL EVERY 4 HOURS PRN
Status: DISCONTINUED | OUTPATIENT
Start: 2020-09-10 | End: 2020-11-11 | Stop reason: CLARIF

## 2020-09-10 RX ORDER — VENLAFAXINE HYDROCHLORIDE 75 MG/1
75 CAPSULE, EXTENDED RELEASE ORAL DAILY
Status: ON HOLD | COMMUNITY
End: 2020-09-10

## 2020-09-10 RX ORDER — VENLAFAXINE HYDROCHLORIDE 75 MG/1
75 CAPSULE, EXTENDED RELEASE ORAL DAILY
Status: DISCONTINUED | OUTPATIENT
Start: 2020-09-11 | End: 2020-09-10

## 2020-09-10 RX ORDER — LORAZEPAM 1 MG/1
1 TABLET ORAL 2 TIMES DAILY PRN
Status: DISCONTINUED | OUTPATIENT
Start: 2020-09-10 | End: 2020-09-18

## 2020-09-10 RX ADMIN — VENLAFAXINE HYDROCHLORIDE 75 MG: 75 CAPSULE, EXTENDED RELEASE ORAL at 14:15

## 2020-09-10 RX ADMIN — BENZTROPINE MESYLATE 0.5 MG: 0.5 TABLET ORAL at 21:15

## 2020-09-10 RX ADMIN — MEMANTINE 5 MG: 5 TABLET ORAL at 15:47

## 2020-09-10 RX ADMIN — LEVOTHYROXINE SODIUM 88 MCG: 88 TABLET ORAL at 14:15

## 2020-09-10 RX ADMIN — ASPIRIN 81 MG: 81 TABLET, COATED ORAL at 14:15

## 2020-09-10 RX ADMIN — SALMETEROL XINAFOATE 1 PUFF: 50 POWDER, METERED ORAL; RESPIRATORY (INHALATION) at 21:51

## 2020-09-10 RX ADMIN — ATORVASTATIN CALCIUM 80 MG: 40 TABLET, FILM COATED ORAL at 21:15

## 2020-09-10 RX ADMIN — OLANZAPINE 20 MG: 10 TABLET, FILM COATED ORAL at 21:15

## 2020-09-10 RX ADMIN — POLYETHYLENE GLYCOL 3350 17 G: 17 POWDER, FOR SOLUTION ORAL at 14:15

## 2020-09-10 RX ADMIN — DOCUSATE SODIUM 100 MG: 100 CAPSULE, LIQUID FILLED ORAL at 21:15
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
## 2020-09-10 NOTE — CONSULTS
Pt seen for initial psychiatric evaluation, please see my dictation for details and recommendations. Patient is not appropriate for inpatient psychiatry as he has significant cognitive deficits. He has been calm and has not displayed any aggression since admission per reports except when he tried to leave. He would benefit from appropriate  or memory care placement while maintaining current medications.    Isha Gonzalez MD
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
## 2020-09-10 NOTE — CODE/RAPID RESPONSE
Brief house NP note:    Patient attempted to elope (made it to elevators) because he wanted to smoke outside. Patient was brought back to room by security. Nicotine patch and gum ordered. Zyprexa now available.     Page with additional concerns,     MAIA Vale, CNP  Hospitalist - [facility]  Text Page  ([phone])

## 2020-09-10 NOTE — CONSULTS
Care Transition Initial Assessment - SW     Met with: Patient    Principal Problem:    Dementia with behavioral disturbance (H)  Active Problems:    TBI with aggressive behavior    HTN (hypertension)    COPD (chronic obstructive pulmonary disease) (H)    Chronic schizophrenia (H)    Smoker       DATA  Lives With: facility resident, [facility] [place]   Identified issues/concerns regarding health management: Received paperwork from [facility]. Pt is on an order for continued commitment until 7/31/2021. This includes a Yanez. OCTAVIA's received from Gina Baird CM, [phone]. Pt signed and SW faxed back to her. Gina reports she received a discharge summary from the group home and believes he has been discharged from there. He was having behaviors in one home in the past week so he was moved to a different home with fewer patients, but his behaviors continued, prompting hospitalization. She is looking at other placement. SW also left a voicemail for  Gabriel, [phone], asking for a call back from him or his supervisor, about pt returning. Per psych, pt is stable and does not need transfer to mental health.     ASSESSMENT  Cognitive Status: Oriented to person only, per nursing. SW needed to tell him the date.  Concerns to be addressed: On-going discharge planning.     PLAN  TBD    JORDY Drummond, LGSW  [phone]  [facility]'

## 2020-09-10 NOTE — PLAN OF CARE
DATE & TIME: 9/10/2020 1430  Cognitive Concerns/ Orientation : Alert and Oriented to person.    BEHAVIOR & AGGRESSION TOOL COLOR: Green, asks about going out to smoke, refusing nicotine patch.   CIWA SCORE: NA   ABNL VS/O2: VSS   MOBILITY: SBA   PAIN MANAGMENT: Denies   DIET: Reg   BOWEL/BLADDER: Continent   ABNL LAB/BG: NA  DRAIN/DEVICES: NA  TELEMETRY RHYTHM: NA  SKIN: No skin concerns  TESTS/PROCEDURES: NA  D/C DAY/GOALS/PLACE: Will need new placement, group home refusing take him back.   OTHER IMPORTANT INFO: Patient has been cooperative today, has not tried to leave floor. Had psych consult today.

## 2020-09-11 LAB
SARS-COV-2 RNA SPEC QL NAA+PROBE: NOT DETECTED
SPECIMEN SOURCE: NORMAL

## 2020-09-11 PROCEDURE — G0378 HOSPITAL OBSERVATION PER HR: HCPCS

## 2020-09-11 PROCEDURE — 99207 ZZC CDG-CUT & PASTE-POTENTIAL IMPACT ON LEVEL: CPT | Performed by: INTERNAL MEDICINE

## 2020-09-11 PROCEDURE — 99232 SBSQ HOSP IP/OBS MODERATE 35: CPT | Performed by: INTERNAL MEDICINE

## 2020-09-11 PROCEDURE — 25000132 ZZH RX MED GY IP 250 OP 250 PS 637: Performed by: INTERNAL MEDICINE

## 2020-09-11 PROCEDURE — 99207 ZZC CDG-CODE CATEGORY CHANGED: CPT | Performed by: INTERNAL MEDICINE

## 2020-09-11 PROCEDURE — 99232 SBSQ HOSP IP/OBS MODERATE 35: CPT | Mod: GT | Performed by: PSYCHIATRY & NEUROLOGY

## 2020-09-11 RX ADMIN — SALMETEROL XINAFOATE 1 PUFF: 50 POWDER, METERED ORAL; RESPIRATORY (INHALATION) at 21:48

## 2020-09-11 RX ADMIN — SALMETEROL XINAFOATE 1 PUFF: 50 POWDER, METERED ORAL; RESPIRATORY (INHALATION) at 08:48

## 2020-09-11 RX ADMIN — OLANZAPINE 20 MG: 10 TABLET, FILM COATED ORAL at 21:44

## 2020-09-11 RX ADMIN — MEMANTINE 5 MG: 5 TABLET ORAL at 08:48

## 2020-09-11 RX ADMIN — DOCUSATE SODIUM 100 MG: 100 CAPSULE, LIQUID FILLED ORAL at 08:48

## 2020-09-11 RX ADMIN — ATORVASTATIN CALCIUM 80 MG: 40 TABLET, FILM COATED ORAL at 21:44

## 2020-09-11 RX ADMIN — VENLAFAXINE HYDROCHLORIDE 75 MG: 75 CAPSULE, EXTENDED RELEASE ORAL at 08:48

## 2020-09-11 RX ADMIN — POLYETHYLENE GLYCOL 3350 17 G: 17 POWDER, FOR SOLUTION ORAL at 08:48

## 2020-09-11 RX ADMIN — DOCUSATE SODIUM 100 MG: 100 CAPSULE, LIQUID FILLED ORAL at 21:44

## 2020-09-11 RX ADMIN — BENZTROPINE MESYLATE 0.5 MG: 0.5 TABLET ORAL at 21:44

## 2020-09-11 RX ADMIN — LEVOTHYROXINE SODIUM 88 MCG: 88 TABLET ORAL at 08:48

## 2020-09-11 RX ADMIN — ASPIRIN 81 MG: 81 TABLET, COATED ORAL at 08:48
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
## 2020-09-11 NOTE — PLAN OF CARE
DATE & TIME: 9/11/2020 1523-0268  Cognitive Concerns/ Orientation : Alert to self   BEHAVIOR & AGGRESSION TOOL COLOR: Green, Pt has been calm and cooperative for shift. Asks about going out to smoke, refusing nicotine patch.   CIWA SCORE: NA   ABNL VS/O2: VSS on RA  MOBILITY: SBA   PAIN MANAGMENT: Denies   DIET: Reg   BOWEL/BLADDER: Continent   ABNL LAB/BG: NA  DRAIN/DEVICES: NA  TELEMETRY RHYTHM: NA  SKIN: No skin concerns  TESTS/PROCEDURES: NA  D/C DAY/GOALS/PLACE: Will need new placement, group home refusing take him back.   OTHER IMPORTANT INFO: Psych consult done yesterday.

## 2020-09-11 NOTE — CONSULTS
"[facility] Psychiatric Consult Progress Note    Interval History:   Pt seen, chart reviewed, case discussed with nursing staff and treating clinicians. Patient is aware he's in [facility] and tells me he was residing at [address] [place] [address] prior to hospitalization. He talked of not being a big fan of his piror living arrangement \"bored and not a lot to do there.\" We talked of his conception of dispo plans and he tells me has has two mansions in [place] that he could stay in and speaks of his wealth. He was entirely calm and pleasant with me, watching TV. Voiced no complaints.     Review of systems:   10 point Review of Systems completed by Ubaldo Ho DO, and is  is negative other than noted in the HPI     Medications:       aspirin  81 mg Oral Daily     atorvastatin  80 mg Oral At Bedtime     benztropine  0.5 mg Oral At Bedtime     docusate sodium  100 mg Oral BID     levothyroxine  88 mcg Oral Daily     memantine  5 mg Oral Daily     nicotine  1 patch Transdermal Daily     nicotine   Transdermal TID     OLANZapine  20 mg Oral Daily     polyethylene glycol  17 g Oral Daily     salmeterol  1 puff Inhalation BID     venlafaxine  75 mg Oral Daily with breakfast     acetaminophen, albuterol, alum & mag hydroxide-simethicone, haloperidol, LORazepam, melatonin, naloxone, nicotine, OLANZapine, OLANZapine zydis, ondansetron **OR** ondansetron    Mental Status Examination:   Appearance:  awake, alert  Eye Contact:  good  Speech:  clear, coherent  Language: Normal  Psychomotor Behavior:  no evidence of tardive dyskinesia, dystonia, or tics  Mood:  Stable, calm  Affect:  appropriate , restricted  Thought Process:  Some delusional content, otherwise linear, grossly logical  Thought Content:  no evidence of suicidal ideation or homicidal ideation and no evidence of response to internal stimuli   Oriented to:  Person and place  Attention Span and Concentration:  fair  Recent and Remote " Memory:  limited  Fund of Knowledge: delayed  Muscle Strength and Tone: normal  Gait and Station: Normal  Insight:  limited  Judgment:  limited         Labs/Vitals:   No results found for this or any previous visit (from the past 24 hour(s)).  B/P: 113/83, T: 97.6, P: 69, R: 16      DIagnoses:   1.  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   2.  Other schizophrenia spectrum disorder.   3.  Alcohol use disorder in remission.   4.  Stimulant use disorder, in remission.   5.  Chronic obstructive pulmonary disease.   6.  Hypertension.          Plan:   Today's evaluation is consistent with that of Dr. Ramos yesterday. No indication for medication changes nor psychiatric hospitalization. Patient is stable from a psychiatric perspective, at his baseline with associated chronic cognitive deficits, but behaviorally and symptomatically placid. SW is assisting in placement logistics as his group home indicated an unwillingness to take him back (SW put out an inquiry with [facility] today to see if group home can discharge without notice).    Attestation:  Patient has been seen and evaluated by me,  Ubaldo Ho,     Visit/Communication Style   VIRTUAL (VIDEO) communication was used to evaluate John due to the COVID pandemic.    John consented to the use of video communication: yes  Video START time: 9:42 am, 9/11/2020  Video STOP time: 9:47 am, 9/11/2020   Patient's location: [facility]   Provider's location during the visit: Home

## 2020-09-11 NOTE — PLAN OF CARE
DATE & TIME: 9/11/2020 4831-3783  Cognitive Concerns/ Orientation : Alert, oriented to self only; forgetful  BEHAVIOR & AGGRESSION TOOL COLOR: Green, calm and cooperative   CIWA SCORE: NA   ABNL VS/O2: VSS on RA  MOBILITY: up independently in room  PAIN MANAGMENT: Denies pain  DIET: Reg diet, good appetite  BOWEL/BLADDER: Continent   ABNL LAB/BG: NA  DRAIN/DEVICES: NA  TELEMETRY RHYTHM: NA  SKIN: No skin concerns  TESTS/PROCEDURES: NA  D/C DAY/GOALS/PLACE: Will need new placement, group home refusing take him back.   OTHER IMPORTANT INFO: Civil commitment & pittman; Door alarm in place.

## 2020-09-11 NOTE — PROGRESS NOTES
SW:  D: Writer has left a message with the [facility] office requesting a return call to clarify if the group home can discharge patient without giving proper written notice.    Also spoke with his [facility] Behavioral Case Manage Gina at [phone].  Patient's Provider thru the [facility] Board is recommending patient be transferred to a higher level of care.  Gina has been in contact with [facility] memory care unit  which both have openings to assess patient.  She also mentioned [facility] which is a group home that specializes in medically or behaviorally complex patients. Writer also has been in contact with [facility] liaison regarding [facility] which has a skilled male behavioral unit. [facility] does have a vacancy.  Referrals are being sent to   [facility] at [phone]  [facility].  [facility] -spoke with Bucky at [phone] and faxed to [phone].    PLAN:  Will await to hear back from the referrals.

## 2020-09-11 NOTE — UTILIZATION REVIEW
Concurrent stay review; Secondary Review Determination     [facility]          Under the authority of the Utilization Management Committee, the utilization review process indicated a secondary review on the above patient.  The review outcome is based on review of the medical records, discussions with staff, and applying clinical experience noted on the date of the review.          (x) Observation Status Appropriate - Concurrent stay review    RATIONALE FOR DETERMINATION   54 year old male who was admitted on 9/9/2020 after aggressive behavior at group home with increasing behavioral disturbance, patient has traumatic brain injury, group home refusing to take him back,  is currently sending referral for placement.    No clear indication to change patient's status to inpatient. The severity of illness, intensity of service provided, expected LOS and risk for adverse outcome make the care appropriate for observation.      This document was produced using voice recognition software       The information on this document is developed by the utilization review team in order for the business office to ensure compliance.  This only denotes the appropriateness of proper admission status and does not reflect the quality of care rendered.         The definitions of Inpatient Status and Observation Status used in making the determination above are those provided in the CMS Coverage Manual, Chapter 1 and Chapter 6, section 70.4.      Sincerely,     TEREZA IBARRA MD    System Medical Director  Utilization Management  [facility].

## 2020-09-12 PROCEDURE — 99232 SBSQ HOSP IP/OBS MODERATE 35: CPT | Performed by: STUDENT IN AN ORGANIZED HEALTH CARE EDUCATION/TRAINING PROGRAM

## 2020-09-12 PROCEDURE — 25000132 ZZH RX MED GY IP 250 OP 250 PS 637: Performed by: INTERNAL MEDICINE

## 2020-09-12 PROCEDURE — 25000132 ZZH RX MED GY IP 250 OP 250 PS 637: Performed by: NURSE PRACTITIONER

## 2020-09-12 PROCEDURE — G0378 HOSPITAL OBSERVATION PER HR: HCPCS

## 2020-09-12 RX ADMIN — BENZTROPINE MESYLATE 0.5 MG: 0.5 TABLET ORAL at 21:29

## 2020-09-12 RX ADMIN — DOCUSATE SODIUM 100 MG: 100 CAPSULE, LIQUID FILLED ORAL at 09:49

## 2020-09-12 RX ADMIN — ASPIRIN 81 MG: 81 TABLET, COATED ORAL at 09:49

## 2020-09-12 RX ADMIN — VENLAFAXINE HYDROCHLORIDE 75 MG: 75 CAPSULE, EXTENDED RELEASE ORAL at 09:49

## 2020-09-12 RX ADMIN — LEVOTHYROXINE SODIUM 88 MCG: 88 TABLET ORAL at 06:52

## 2020-09-12 RX ADMIN — NICOTINE POLACRILEX 2 MG: 2 GUM, CHEWING ORAL at 21:33

## 2020-09-12 RX ADMIN — SALMETEROL XINAFOATE 1 PUFF: 50 POWDER, METERED ORAL; RESPIRATORY (INHALATION) at 10:00

## 2020-09-12 RX ADMIN — MEMANTINE 5 MG: 5 TABLET ORAL at 09:49

## 2020-09-12 RX ADMIN — SALMETEROL XINAFOATE 1 PUFF: 50 POWDER, METERED ORAL; RESPIRATORY (INHALATION) at 21:29

## 2020-09-12 RX ADMIN — DOCUSATE SODIUM 100 MG: 100 CAPSULE, LIQUID FILLED ORAL at 21:29

## 2020-09-12 RX ADMIN — OLANZAPINE 20 MG: 10 TABLET, FILM COATED ORAL at 21:28

## 2020-09-12 RX ADMIN — POLYETHYLENE GLYCOL 3350 17 G: 17 POWDER, FOR SOLUTION ORAL at 09:49

## 2020-09-12 RX ADMIN — ATORVASTATIN CALCIUM 80 MG: 40 TABLET, FILM COATED ORAL at 21:28

## 2020-09-12 NOTE — PLAN OF CARE
DATE & TIME: 9/12/2020 9796-7787  Cognitive Concerns/ Orientation : A&O to self and place; forgetful.  BEHAVIOR & AGGRESSION TOOL COLOR: Green, calm and cooperative   ABNL VS/O2: VSS on RA  MOBILITY: up independently in room  PAIN MANAGMENT: Denies pain  DIET: Regular  BOWEL/BLADDER: Continent   ABNL LAB/BG: N/A  DRAIN/DEVICES: N/A  TELEMETRY RHYTHM: N/A  SKIN: ecchymotic, scabs  TESTS/PROCEDURES: N/A  D/C DAY/GOALS/PLACE: Will need new placement, group home refusing take him back. SW following.  OTHER IMPORTANT INFO: Door alarm in place.     6620-6436: no significant changes.

## 2020-09-12 NOTE — PLAN OF CARE
DATE & TIME: 9/11/2020 7926-4457  Cognitive Concerns/ Orientation : Alert, oriented to self only; forgetful.  BEHAVIOR & AGGRESSION TOOL COLOR: Green, calm and cooperative   CIWA SCORE: N/A   ABNL VS/O2: VSS on RA  MOBILITY: up independently in room  PAIN MANAGMENT: Denies pain  DIET: Regular  BOWEL/BLADDER: Continent   ABNL LAB/BG: N/A  DRAIN/DEVICES: N/A  TELEMETRY RHYTHM: N/A  SKIN: No skin concerns  TESTS/PROCEDURES: N/A  D/C DAY/GOALS/PLACE: Will need new placement, group home refusing take him back. SW following.  OTHER IMPORTANT INFO: Door alarm in place.

## 2020-09-12 NOTE — PROGRESS NOTES
[facility]    Hospitalist Progress Note    Assessment & Plan   54 year old male who was admitted on 9/9/2020 after aggressive behavior at group home, and left, and now they will not take him back.    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/alcohol use disorder with behavioral disturbance   Chronic schizophrenia  Psych has seen, stable from psychiatric perspective with associated chronic cognitive deficits.   - Continue chronic psych meds - Benztropine, memantine, olanzapine, venlafaxine  - Appreciate social work assistance with new placement     HLD  -Continue ASA and atorvastatin     COPD (chronic obstructive pulmonary disease)   -Continue salmeterol     Smoker -- no interest in quitting     Hypothyroidism  - Continue Levothyroxine       Plan:  Awaiting placement.     DVT Prophylaxis: Low Risk/Ambulatory with no VTE prophylaxis indicated  Code Status: Full Code    Disposition: Expected discharge when new place available.     Rosamaria Angeles MD    Interval History   No complaints -- feels thirsty and wants to eat breakfast. Discussed that we are trying to find a new place for him to go. He reports that he has a mansion in [place] that he could go to. No other acute concerns .    Physical Exam   Temp: 97.6  F (36.4  C) Temp src: Oral BP: 99/60 Pulse: 72   Resp: 16 SpO2: 99 % O2 Device: None (Room air)    Vitals:    09/09/20 1754   Weight: 70 kg (154 lb 5.2 oz)     Vital Signs with Ranges  Temp:  [97.6  F (36.4  C)] 97.6  F (36.4  C)  Pulse:  [72-87] 72  Resp:  [16] 16  BP: ()/(60-86) 99/60  SpO2:  [99 %] 99 %  No intake/output data recorded.    # Pain Assessment:  Current Pain Score 9/12/2020   Patient currently in pain? denies   Pain score (0-10) -   John s pain level was assessed and he currently denies pain.        Constitutional: Awake, alert, cooperative, no apparent distress  Respiratory: Clear to auscultation bilaterally, no crackles or wheezing  Cardiovascular: Regular rate  and rhythm  GI: non-distended, non-tender  Extrem: no ankle edema  Neuro: has some delusions today, no focal motor or sensory deficits, moves all extremities.     Medications       aspirin  81 mg Oral Daily     atorvastatin  80 mg Oral At Bedtime     benztropine  0.5 mg Oral At Bedtime     docusate sodium  100 mg Oral BID     levothyroxine  88 mcg Oral Daily     memantine  5 mg Oral Daily     nicotine  1 patch Transdermal Daily     nicotine   Transdermal TID     OLANZapine  20 mg Oral Daily     polyethylene glycol  17 g Oral Daily     salmeterol  1 puff Inhalation BID     venlafaxine  75 mg Oral Daily with breakfast       Data   Recent Labs   Lab 09/09/20  1230   WBC 6.2   HGB 16.3   MCV 93         POTASSIUM 4.0   CHLORIDE 111*   CO2 25   BUN 12   CR 0.67   ANIONGAP 1*   LESLEE 8.3*   GLC 97   ALBUMIN 3.4   PROTTOTAL 7.6   BILITOTAL 0.3   ALKPHOS 99   ALT 36   AST 44       Imaging:   No results found for this or any previous visit (from the past 24 hour(s)).

## 2020-09-12 NOTE — PLAN OF CARE
DATE & TIME: 9/11/2020 2371-0366  Cognitive Concerns/ Orientation : Alert, oriented to self only; forgetful  BEHAVIOR & AGGRESSION TOOL COLOR: Green, calm and cooperative   CIWA SCORE: NA   ABNL VS/O2: VSS on RA  MOBILITY: up independently in room  PAIN MANAGMENT: Denies pain  DIET: Reg diet, good appetite  BOWEL/BLADDER: Continent   ABNL LAB/BG: NA  DRAIN/DEVICES: NA  TELEMETRY RHYTHM: NA  SKIN: No skin concerns  TESTS/PROCEDURES: NA  D/C DAY/GOALS/PLACE: Will need new placement, group home refusing take him back. SW following.  OTHER IMPORTANT INFO: Door alarm placed.

## 2020-09-13 PROCEDURE — 99231 SBSQ HOSP IP/OBS SF/LOW 25: CPT | Performed by: STUDENT IN AN ORGANIZED HEALTH CARE EDUCATION/TRAINING PROGRAM

## 2020-09-13 PROCEDURE — G0378 HOSPITAL OBSERVATION PER HR: HCPCS

## 2020-09-13 PROCEDURE — 99207 ZZC CDG-CODE CATEGORY CHANGED: CPT | Performed by: STUDENT IN AN ORGANIZED HEALTH CARE EDUCATION/TRAINING PROGRAM

## 2020-09-13 PROCEDURE — 25000132 ZZH RX MED GY IP 250 OP 250 PS 637: Performed by: INTERNAL MEDICINE

## 2020-09-13 PROCEDURE — 25000132 ZZH RX MED GY IP 250 OP 250 PS 637: Performed by: NURSE PRACTITIONER

## 2020-09-13 RX ADMIN — SALMETEROL XINAFOATE 1 PUFF: 50 POWDER, METERED ORAL; RESPIRATORY (INHALATION) at 08:45

## 2020-09-13 RX ADMIN — DOCUSATE SODIUM 100 MG: 100 CAPSULE, LIQUID FILLED ORAL at 21:09

## 2020-09-13 RX ADMIN — VENLAFAXINE HYDROCHLORIDE 75 MG: 75 CAPSULE, EXTENDED RELEASE ORAL at 08:43

## 2020-09-13 RX ADMIN — MEMANTINE 5 MG: 5 TABLET ORAL at 08:43

## 2020-09-13 RX ADMIN — LEVOTHYROXINE SODIUM 88 MCG: 88 TABLET ORAL at 06:47

## 2020-09-13 RX ADMIN — SALMETEROL XINAFOATE 1 PUFF: 50 POWDER, METERED ORAL; RESPIRATORY (INHALATION) at 21:11

## 2020-09-13 RX ADMIN — ATORVASTATIN CALCIUM 80 MG: 40 TABLET, FILM COATED ORAL at 21:09

## 2020-09-13 RX ADMIN — BENZTROPINE MESYLATE 0.5 MG: 0.5 TABLET ORAL at 21:09

## 2020-09-13 RX ADMIN — ASPIRIN 81 MG: 81 TABLET, COATED ORAL at 08:43

## 2020-09-13 RX ADMIN — NICOTINE POLACRILEX 2 MG: 2 GUM, CHEWING ORAL at 21:13

## 2020-09-13 RX ADMIN — POLYETHYLENE GLYCOL 3350 17 G: 17 POWDER, FOR SOLUTION ORAL at 08:43

## 2020-09-13 RX ADMIN — DOCUSATE SODIUM 100 MG: 100 CAPSULE, LIQUID FILLED ORAL at 08:43

## 2020-09-13 RX ADMIN — OLANZAPINE 20 MG: 10 TABLET, FILM COATED ORAL at 21:10

## 2020-09-13 NOTE — PLAN OF CARE
DATE & TIME: 9/12/2020 9801-0852  Cognitive Concerns/ Orientation : Alert to self only. Forgetful.   BEHAVIOR & AGGRESSION TOOL COLOR: Green, calm and cooperative   ABNL VS/O2: VSS on RA, daily vitals.   MOBILITY: up independently in room  PAIN MANAGMENT: Denies pain  DIET: Regular  BOWEL/BLADDER: Continent   ABNL LAB/BG: N/A  DRAIN/DEVICES: N/A  TELEMETRY RHYTHM: N/A  SKIN: ecchymotic, scabs  TESTS/PROCEDURES: N/A  D/C DAY/GOALS/PLACE: Will need new placement, group home refusing take pt back. SW following.  OTHER IMPORTANT INFO: Door alarm in place.

## 2020-09-13 NOTE — PLAN OF CARE
DATE & TIME: 9/13/2020 9930-4963  Cognitive Concerns/ Orientation : Alert to self, knows he is in the hospital, does not remember which hospital. Forgetful.   BEHAVIOR & AGGRESSION TOOL COLOR: Green, calm and cooperative   ABNL VS/O2: VSS on RA   MOBILITY: up independently in room  PAIN MANAGMENT: Denies pain  DIET: Regular, tells what he would like to order  BOWEL/BLADDER: Continent   ABNL LAB/BG: N/A  DRAIN/DEVICES: N/A  TELEMETRY RHYTHM: N/A  SKIN: ecchymotic, scabs  TESTS/PROCEDURES: N/A  D/C DAY/GOALS/PLACE: Will need new placement, group home refusing take him back. SW following.    OTHER IMPORTANT INFO: Door alarm in place.

## 2020-09-13 NOTE — PLAN OF CARE
DATE & TIME: 9/13/2020 6813-5153  Cognitive Concerns/ Orientation : Alert to self, knows he is in the hospital, does not remember which hospital. Forgetful.   BEHAVIOR & AGGRESSION TOOL COLOR: Green, calm and cooperative   ABNL VS/O2: VSS on RA, daily vitals.   MOBILITY: up independently in room  PAIN MANAGMENT: Denies pain  DIET: Regular. Good appetite.  BOWEL/BLADDER: Continent   ABNL LAB/BG: N/A  DRAIN/DEVICES: N/A  TELEMETRY RHYTHM: N/A  SKIN: ecchymotic, scabs  TESTS/PROCEDURES: N/A  D/C DAY/GOALS/PLACE: Will need new placement, group home refusing take him back. SW following.    OTHER IMPORTANT INFO: Door alarm in place.

## 2020-09-13 NOTE — PROGRESS NOTES
[facility]    Hospitalist Progress Note    Assessment & Plan   54 year old male who was admitted on 9/9/2020 after aggressive behavior at group home, and left, and now they will not take him back. Pt is medically stable for discharge. Awaiting new placement. Appreciate social work assistance.     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/alcohol use disorder with behavioral disturbance   Chronic schizophrenia  Psych has seen, stable from psychiatric perspective with associated chronic cognitive deficits.   - Continue chronic psych meds - Benztropine, memantine, olanzapine, venlafaxine  - Appreciate social work assistance with new placement     HLD  -Continue ASA and atorvastatin     COPD (chronic obstructive pulmonary disease)   -Continue salmeterol     Smoker -- no interest in quitting     Hypothyroidism  - Continue Levothyroxine       Plan:  Awaiting placement.     DVT Prophylaxis: Low Risk/Ambulatory with no VTE prophylaxis indicated  Code Status: Full Code    Disposition: Expected discharge when new place available.     Rosamaria Angeles MD    Interval History   No complaints -- wants to smoke. Eating and drinking well. No shortness of breath or pain.     Physical Exam   Temp: 97.6  F (36.4  C) Temp src: Oral BP: 123/87 Pulse: 81   Resp: 16 SpO2: 98 % O2 Device: None (Room air)    Vitals:    09/09/20 1754   Weight: 70 kg (154 lb 5.2 oz)     Vital Signs with Ranges  Temp:  [97.6  F (36.4  C)-98.3  F (36.8  C)] 97.6  F (36.4  C)  Pulse:  [81-83] 81  Resp:  [16] 16  BP: (122-123)/(82-87) 123/87  SpO2:  [98 %-100 %] 98 %  I/O last 3 completed shifts:  In: 560 [P.O.:560]  Out: -     # Pain Assessment:  Current Pain Score 9/13/2020   Patient currently in pain? denies   Pain score (0-10) -   John s pain level was assessed and he currently denies pain.        Constitutional: Awake, alert, cooperative, no apparent distress  Respiratory: Voice is hoarse, No increased work of breathing   Cardiovascular:  Regular rate and rhythm  GI: non-distended, non-tender  Extrem: no abnormalities. Moves all extremities.   Neuro: has some delusions today, no focal motor or sensory deficits, moves all extremities.     Medications       aspirin  81 mg Oral Daily     atorvastatin  80 mg Oral At Bedtime     benztropine  0.5 mg Oral At Bedtime     docusate sodium  100 mg Oral BID     levothyroxine  88 mcg Oral Daily     memantine  5 mg Oral Daily     nicotine  1 patch Transdermal Daily     nicotine   Transdermal TID     OLANZapine  20 mg Oral Daily     polyethylene glycol  17 g Oral Daily     salmeterol  1 puff Inhalation BID     venlafaxine  75 mg Oral Daily with breakfast       Data   Recent Labs   Lab 09/09/20  1230   WBC 6.2   HGB 16.3   MCV 93         POTASSIUM 4.0   CHLORIDE 111*   CO2 25   BUN 12   CR 0.67   ANIONGAP 1*   LESLEE 8.3*   GLC 97   ALBUMIN 3.4   PROTTOTAL 7.6   BILITOTAL 0.3   ALKPHOS 99   ALT 36   AST 44       Imaging:   No results found for this or any previous visit (from the past 24 hour(s)).

## 2020-09-14 PROCEDURE — 99207 ZZC CDG-CUT & PASTE-POTENTIAL IMPACT ON LEVEL: CPT | Performed by: STUDENT IN AN ORGANIZED HEALTH CARE EDUCATION/TRAINING PROGRAM

## 2020-09-14 PROCEDURE — 99232 SBSQ HOSP IP/OBS MODERATE 35: CPT | Performed by: STUDENT IN AN ORGANIZED HEALTH CARE EDUCATION/TRAINING PROGRAM

## 2020-09-14 PROCEDURE — 25000132 ZZH RX MED GY IP 250 OP 250 PS 637: Performed by: INTERNAL MEDICINE

## 2020-09-14 PROCEDURE — G0378 HOSPITAL OBSERVATION PER HR: HCPCS

## 2020-09-14 RX ADMIN — VENLAFAXINE HYDROCHLORIDE 75 MG: 75 CAPSULE, EXTENDED RELEASE ORAL at 09:19

## 2020-09-14 RX ADMIN — LEVOTHYROXINE SODIUM 88 MCG: 88 TABLET ORAL at 09:19

## 2020-09-14 RX ADMIN — BENZTROPINE MESYLATE 0.5 MG: 0.5 TABLET ORAL at 21:07

## 2020-09-14 RX ADMIN — ASPIRIN 81 MG: 81 TABLET, COATED ORAL at 09:19

## 2020-09-14 RX ADMIN — OLANZAPINE 20 MG: 10 TABLET, FILM COATED ORAL at 21:07

## 2020-09-14 RX ADMIN — SALMETEROL XINAFOATE 1 PUFF: 50 POWDER, METERED ORAL; RESPIRATORY (INHALATION) at 09:21

## 2020-09-14 RX ADMIN — ATORVASTATIN CALCIUM 80 MG: 40 TABLET, FILM COATED ORAL at 21:07

## 2020-09-14 RX ADMIN — DOCUSATE SODIUM 100 MG: 100 CAPSULE, LIQUID FILLED ORAL at 09:19

## 2020-09-14 RX ADMIN — DOCUSATE SODIUM 100 MG: 100 CAPSULE, LIQUID FILLED ORAL at 21:07

## 2020-09-14 RX ADMIN — ACETAMINOPHEN 650 MG: 325 TABLET, FILM COATED ORAL at 21:07

## 2020-09-14 RX ADMIN — MEMANTINE 5 MG: 5 TABLET ORAL at 09:19

## 2020-09-14 RX ADMIN — POLYETHYLENE GLYCOL 3350 17 G: 17 POWDER, FOR SOLUTION ORAL at 09:19

## 2020-09-14 NOTE — PLAN OF CARE
DATE & TIME: 9/13/2020 8005-7757  Cognitive Concerns/ Orientation : Alert to self, Forgetful.   BEHAVIOR & AGGRESSION TOOL COLOR: Green, calm and cooperative   ABNL VS/O2: VSS on RA, daily vitals.   MOBILITY: up independently in room  PAIN MANAGMENT: Denies pain  DIET: Regular  BOWEL/BLADDER: Continent   ABNL LAB/BG: N/A  DRAIN/DEVICES: N/A  TELEMETRY RHYTHM: N/A  SKIN: ecchymotic, scabs  TESTS/PROCEDURES: N/A  D/C DAY/GOALS/PLACE: Will need new placement, group home refusing take him back. SW following.  OTHER IMPORTANT INFO: Door alarm in place.

## 2020-09-14 NOTE — PROGRESS NOTES
[facility]    Hospitalist Progress Note    Assessment & Plan   54 year old male who was admitted on 9/9/2020 after aggressive behavior at group home, and left, and now they will not take him back. Pt is medically stable for discharge. Awaiting new placement. Appreciate social work assistance.     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/alcohol use disorder with behavioral disturbance   Chronic schizophrenia  Psych has seen, stable from psychiatric perspective with associated chronic cognitive deficits.   - Continue chronic psych meds - Benztropine, memantine, olanzapine, venlafaxine  - Appreciate social work assistance with new placement     HLD  -Continue ASA and atorvastatin     COPD (chronic obstructive pulmonary disease)   -Continue salmeterol     Smoker -- no interest in quitting     Hypothyroidism  - Continue Levothyroxine       Plan:  Awaiting placement.     DVT Prophylaxis: Low Risk/Ambulatory with no VTE prophylaxis indicated  Code Status: Full Code    Disposition: Expected discharge when new place available.     Rosamaria Angeles MD    Interval History   No complaints -- wants to smoke. Eating breakfast. Has good appetite. No pain or other concerns.     Physical Exam   Temp: 97.8  F (36.6  C) Temp src: Oral BP: 117/83 Pulse: 81   Resp: 18 SpO2: 100 % O2 Device: None (Room air)    Vitals:    09/09/20 1754   Weight: 70 kg (154 lb 5.2 oz)     Vital Signs with Ranges  Temp:  [97.8  F (36.6  C)-98.5  F (36.9  C)] 97.8  F (36.6  C)  Pulse:  [81-88] 81  Resp:  [18] 18  BP: (117-130)/(83-94) 117/83  SpO2:  [98 %-100 %] 100 %  I/O last 3 completed shifts:  In: 560 [P.O.:560]  Out: -     # Pain Assessment:  Current Pain Score 9/14/2020   Patient currently in pain? denies   Pain score (0-10) -   John s pain level was assessed and he currently denies pain.        Constitutional: Awake, alert, cooperative, no apparent distress  Respiratory: Voice is hoarse, No increased work of breathing    Cardiovascular: Regular rate and rhythm  GI: non-distended, non-tender  Extrem: no abnormalities. Moves all extremities.   Neuro: has some delusions today, no focal motor or sensory deficits, moves all extremities.     Medications       aspirin  81 mg Oral Daily     atorvastatin  80 mg Oral At Bedtime     benztropine  0.5 mg Oral At Bedtime     docusate sodium  100 mg Oral BID     levothyroxine  88 mcg Oral Daily     memantine  5 mg Oral Daily     nicotine  1 patch Transdermal Daily     nicotine   Transdermal TID     OLANZapine  20 mg Oral Daily     polyethylene glycol  17 g Oral Daily     salmeterol  1 puff Inhalation BID     venlafaxine  75 mg Oral Daily with breakfast       Data   Recent Labs   Lab 09/09/20  1230   WBC 6.2   HGB 16.3   MCV 93         POTASSIUM 4.0   CHLORIDE 111*   CO2 25   BUN 12   CR 0.67   ANIONGAP 1*   LESLEE 8.3*   GLC 97   ALBUMIN 3.4   PROTTOTAL 7.6   BILITOTAL 0.3   ALKPHOS 99   ALT 36   AST 44       Imaging:   No results found for this or any previous visit (from the past 24 hour(s)).

## 2020-09-14 NOTE — PLAN OF CARE
DATE & TIME: 9/14 0999-0251    Cognitive Concerns/ Orientation : Orientedx2, confused to time and situation.    BEHAVIOR & AGGRESSION TOOL COLOR: Green   ABNL VS/O2: VSS on RA  MOBILITY: Independent in room.   PAIN MANAGMENT: Denies  DIET: Regular  BOWEL/BLADDER: Continent of both   ABNL LAB/BG: none  DRAIN/DEVICES: none  SKIN: No concerns   D/C DAY/GOALS/PLACE: Pending placement.   OTHER IMPORTANT INFO: Door alarm in place. Easily redirectable. Took a shower this AM. Patient is physically able to care for himself but needs reminders and cues to complete ADLs.

## 2020-09-15 PROCEDURE — G0378 HOSPITAL OBSERVATION PER HR: HCPCS

## 2020-09-15 PROCEDURE — 25000132 ZZH RX MED GY IP 250 OP 250 PS 637: Performed by: INTERNAL MEDICINE

## 2020-09-15 PROCEDURE — 99207 ZZC CDG-CUT & PASTE-POTENTIAL IMPACT ON LEVEL: CPT | Performed by: STUDENT IN AN ORGANIZED HEALTH CARE EDUCATION/TRAINING PROGRAM

## 2020-09-15 PROCEDURE — 99232 SBSQ HOSP IP/OBS MODERATE 35: CPT | Performed by: STUDENT IN AN ORGANIZED HEALTH CARE EDUCATION/TRAINING PROGRAM

## 2020-09-15 RX ADMIN — ASPIRIN 81 MG: 81 TABLET, COATED ORAL at 08:55

## 2020-09-15 RX ADMIN — DOCUSATE SODIUM 100 MG: 100 CAPSULE, LIQUID FILLED ORAL at 22:21

## 2020-09-15 RX ADMIN — ATORVASTATIN CALCIUM 80 MG: 40 TABLET, FILM COATED ORAL at 22:21

## 2020-09-15 RX ADMIN — BENZTROPINE MESYLATE 0.5 MG: 0.5 TABLET ORAL at 22:21

## 2020-09-15 RX ADMIN — LEVOTHYROXINE SODIUM 88 MCG: 88 TABLET ORAL at 06:57

## 2020-09-15 RX ADMIN — ACETAMINOPHEN 650 MG: 325 TABLET, FILM COATED ORAL at 03:03

## 2020-09-15 RX ADMIN — OLANZAPINE 20 MG: 10 TABLET, FILM COATED ORAL at 22:21

## 2020-09-15 RX ADMIN — DOCUSATE SODIUM 100 MG: 100 CAPSULE, LIQUID FILLED ORAL at 08:55

## 2020-09-15 RX ADMIN — MEMANTINE 5 MG: 5 TABLET ORAL at 08:55

## 2020-09-15 RX ADMIN — VENLAFAXINE HYDROCHLORIDE 75 MG: 75 CAPSULE, EXTENDED RELEASE ORAL at 08:55

## 2020-09-15 NOTE — PLAN OF CARE
DATE & TIME: 9/14/2020; 9128-8403   Cognitive Concerns/ Orientation : A & O to self only (disoriented to time, place and situation); calm  BEHAVIOR & AGGRESSION TOOL COLOR: Green   CIWA SCORE: N/A   ABNL VS/O2: VSS on RA   MOBILITY: independent in the room  PAIN MANAGMENT: mild neck pain; tylenol given x 1 for pain management   DIET: Regular; tolerating well   BOWEL/BLADDER: BS active x 4; continent   ABNL LAB/BG: Chloride= 111; Calcium= 8.3   DRAIN/DEVICES: no IV access   TELEMETRY RHYTHM: N/A  SKIN: Scattered bruising; intact   TESTS/PROCEDURES: N/A  D/C DAY/GOALS/PLACE: pending discharge   OTHER IMPORTANT INFO: Door alarm in place (pt. To be put on 72 hr. Hold if attempting to leave); sets off alarm frequently even after pt. Education on the call light; has not tried to leave, just wanting food/drink. Refusing nicotine gum and patches; Nursing will continue to monitor and assess.     MD/RN ROUNDING SIGNED OFF D/E SHIFT: N/A  COMMIT TO SIT DONE AND SIGNED OFF: COMPLETE

## 2020-09-15 NOTE — PROGRESS NOTES
Observation goals  PRIOR TO DISCHARGE      Comments: Placement: NOT MET     Corrina Martinez RN on 9/14/2020 at 1900

## 2020-09-15 NOTE — PROGRESS NOTES
[facility]    Hospitalist Progress Note    Assessment & Plan   54 year old male who was admitted on 9/9/2020 after aggressive behavior at group home, and left, and now they will not take him back. Pt is medically stable for discharge. Awaiting new placement. Appreciate social work assistance.     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/alcohol use disorder with behavioral disturbance   Chronic schizophrenia  Psych has seen, stable from psychiatric perspective with associated chronic cognitive deficits.   - Continue chronic psych meds - Benztropine, memantine, olanzapine, venlafaxine  - Appreciate social work assistance with new placement      HLD  -Continue ASA and atorvastatin     COPD (chronic obstructive pulmonary disease)   -Continue salmeterol     Smoker -- no interest in quitting     Hypothyroidism  - Continue Levothyroxine       Plan:  Awaiting placement.     DVT Prophylaxis: Low Risk/Ambulatory with no VTE prophylaxis indicated  Code Status: Full Code    Disposition: Expected discharge when new place available.     Rosamaria Angeles MD    Interval History   No complaints -- Continues to tell me that he has a mansion in [place] as well as a [place], and two other mansions somewhere else. He does not have any acute concerns. Good appetite. No pain.      Physical Exam   Temp: 97.2  F (36.2  C) Temp src: Oral BP: (!) 119/90 Pulse: 83   Resp: 18 SpO2: 100 % O2 Device: None (Room air)    Vitals:    09/09/20 1754   Weight: 70 kg (154 lb 5.2 oz)     Vital Signs with Ranges  Temp:  [97.2  F (36.2  C)-98.2  F (36.8  C)] 97.2  F (36.2  C)  Pulse:  [80-83] 83  Resp:  [18] 18  BP: (119-131)/(90-98) 119/90  SpO2:  [99 %-100 %] 100 %  I/O last 3 completed shifts:  In: 240 [P.O.:240]  Out: -     # Pain Assessment:  Current Pain Score 9/15/2020   Patient currently in pain? yes   Pain score (0-10) 3   John s pain level was assessed and he currently denies pain.         Constitutional: Awake, alert, cooperative, no apparent distress  Respiratory: Voice is hoarse, No increased work of breathing   Cardiovascular: Regular rate and rhythm  Extrem: no abnormalities. Moves all extremities.   Neuro: has some delusions today, no focal motor or sensory deficits, moves all extremities.     Medications       aspirin  81 mg Oral Daily     atorvastatin  80 mg Oral At Bedtime     benztropine  0.5 mg Oral At Bedtime     docusate sodium  100 mg Oral BID     levothyroxine  88 mcg Oral Daily     memantine  5 mg Oral Daily     nicotine  1 patch Transdermal Daily     nicotine   Transdermal TID     OLANZapine  20 mg Oral Daily     polyethylene glycol  17 g Oral Daily     salmeterol  1 puff Inhalation BID     venlafaxine  75 mg Oral Daily with breakfast       Data   Recent Labs   Lab 09/09/20  1230   WBC 6.2   HGB 16.3   MCV 93         POTASSIUM 4.0   CHLORIDE 111*   CO2 25   BUN 12   CR 0.67   ANIONGAP 1*   LESLEE 8.3*   GLC 97   ALBUMIN 3.4   PROTTOTAL 7.6   BILITOTAL 0.3   ALKPHOS 99   ALT 36   AST 44       Imaging:   No results found for this or any previous visit (from the past 24 hour(s)).

## 2020-09-15 NOTE — PLAN OF CARE
Cognitive Concerns/ Orientation : A & O to self only (disoriented to time, place and situation); calm  BEHAVIOR & AGGRESSION TOOL COLOR: Green   CIWA SCORE: N/A   ABNL VS/O2: VSS on RA   MOBILITY: independent in the room  PAIN MANAGMENT: denies pain this shift   DIET: Regular; tolerating well   BOWEL/BLADDER: continent, denies constipation.  ABNL LAB/BG: no new labs to review   DRAIN/DEVICES: no IV access   TELEMETRY RHYTHM: N/A  SKIN: Scattered bruising; intact   TESTS/PROCEDURES: N/A  D/C DAY/GOALS/PLACE: pending discharge   OTHER IMPORTANT INFO: Door alarm in place (pt. To be put on 72 hr. Hold if attempting to leave); sets off alarm frequently even after pt.     Past Medical History:    COPD  Heart Disease  Hypertension  Substance abuse  TBI  Psychosis  Alcohol abuse  Hypokalemia  Cerebrovascular disease  Macrocytic anemia  Aortic valve insufficiency  Alcohol induced liver disorder  Infectious endocarditis  Tobacco abuse  Alcohol-induced chronic pancreatitis  Hepatitis C virus infection   Depressive disorder  Macrocytic anemia  HFrEF  Dyslipidemia  Pneumococcal bacteremia

## 2020-09-15 NOTE — PLAN OF CARE
DATE & TIME: 9/14/2020; 7089-1194    Cognitive Concerns/ Orientation : A & O x 2 (disoriented to time and situation); calm  BEHAVIOR & AGGRESSION TOOL COLOR: Green   CIWA SCORE: N/A   ABNL VS/O2: VSS on RA   MOBILITY: independent in the room  PAIN MANAGMENT: mild neck pain; tylenol given x 1 for pain management   DIET: Regular; tolerating well   BOWEL/BLADDER: BS active x 4; continent   ABNL LAB/BG: Chloride= 111; Calcium= 8.3   DRAIN/DEVICES: no IV access   TELEMETRY RHYTHM: N/A  SKIN: Scattered bruising; intact   TESTS/PROCEDURES: N/A  D/C DAY/GOALS/PLACE: pending discharge   OTHER IMPORTANT INFO: Door alarm in place (pt. To be put on 72 hr. Hold if attempting to leave); sets off alarm frequently even after pt. Education on the call light; has not tried to leave, just wanting food/drink. Refusing nicotine gum and patches; Nursing will continue to monitor and assess.     MD/RN ROUNDING SIGNED OFF D/E SHIFT: N/A  COMMIT TO SIT DONE AND SIGNED OFF: COMPLETE      Observation goals  PRIOR TO DISCHARGE      Comments: Placement: NOT MET

## 2020-09-16 PROCEDURE — 25000132 ZZH RX MED GY IP 250 OP 250 PS 637: Performed by: INTERNAL MEDICINE

## 2020-09-16 PROCEDURE — G0378 HOSPITAL OBSERVATION PER HR: HCPCS

## 2020-09-16 PROCEDURE — 99207 ZZC CDG-CODE CATEGORY CHANGED: CPT | Performed by: INTERNAL MEDICINE

## 2020-09-16 PROCEDURE — 25000132 ZZH RX MED GY IP 250 OP 250 PS 637: Performed by: NURSE PRACTITIONER

## 2020-09-16 PROCEDURE — 99225 ZZC SUBSEQUENT OBSERVATION CARE,LEVEL II: CPT | Performed by: INTERNAL MEDICINE

## 2020-09-16 RX ADMIN — OLANZAPINE 20 MG: 10 TABLET, FILM COATED ORAL at 21:50

## 2020-09-16 RX ADMIN — SALMETEROL XINAFOATE 1 PUFF: 50 POWDER, METERED ORAL; RESPIRATORY (INHALATION) at 08:58

## 2020-09-16 RX ADMIN — OLANZAPINE 10 MG: 5 TABLET, ORALLY DISINTEGRATING ORAL at 10:43

## 2020-09-16 RX ADMIN — DOCUSATE SODIUM 100 MG: 100 CAPSULE, LIQUID FILLED ORAL at 08:55

## 2020-09-16 RX ADMIN — VENLAFAXINE HYDROCHLORIDE 75 MG: 75 CAPSULE, EXTENDED RELEASE ORAL at 08:55

## 2020-09-16 RX ADMIN — ATORVASTATIN CALCIUM 80 MG: 40 TABLET, FILM COATED ORAL at 21:50

## 2020-09-16 RX ADMIN — SALMETEROL XINAFOATE 1 PUFF: 50 POWDER, METERED ORAL; RESPIRATORY (INHALATION) at 21:49

## 2020-09-16 RX ADMIN — BENZTROPINE MESYLATE 0.5 MG: 0.5 TABLET ORAL at 21:50

## 2020-09-16 RX ADMIN — MEMANTINE 5 MG: 5 TABLET ORAL at 08:55

## 2020-09-16 RX ADMIN — LEVOTHYROXINE SODIUM 88 MCG: 88 TABLET ORAL at 08:03

## 2020-09-16 RX ADMIN — NICOTINE POLACRILEX 2 MG: 2 GUM, CHEWING ORAL at 15:31

## 2020-09-16 RX ADMIN — POLYETHYLENE GLYCOL 3350 17 G: 17 POWDER, FOR SOLUTION ORAL at 08:56

## 2020-09-16 RX ADMIN — ASPIRIN 81 MG: 81 TABLET, COATED ORAL at 08:55

## 2020-09-16 RX ADMIN — NICOTINE POLACRILEX 2 MG: 2 GUM, CHEWING ORAL at 20:17

## 2020-09-16 RX ADMIN — NICOTINE POLACRILEX 2 MG: 2 GUM, CHEWING ORAL at 10:44

## 2020-09-16 RX ADMIN — DOCUSATE SODIUM 100 MG: 100 CAPSULE, LIQUID FILLED ORAL at 21:50

## 2020-09-16 NOTE — PLAN OF CARE
DATE & TIME: 9- days    Cognitive Concerns/ Orientation : alert to self    BEHAVIOR & AGGRESSION TOOL COLOR: yellow upset wants to go outside to smoke  CIWA SCORE: n/a   ABNL VS/O2: VSS  MOBILITY: up independently  PAIN MANAGMENT: denies   DIET:  Regular   BOWEL/BLADDER:  continent   ABNL LAB/BG: no new labs  DRAIN/DEVICES: none  TELEMETRY RHYTHM: n/a  SKIN: bruised   TESTS/PROCEDURES: none  D/C DAY/GOALS/PLACE: uncertain  OTHER IMPORTANT INFO:  pt given zyprexa once today for restless and wanting to go out to smoke.   MD/RN ROUNDING SIGNED OFF D/E SHIFT: yes  COMMIT TO SIT DONE AND SIGNED OFF yes

## 2020-09-16 NOTE — PROGRESS NOTES
"BRIEF NUTRITION ASSESSMENT    REASON FOR ASSESSMENT:  John Juárez is a 54 year old male seen by Registered Dietitian for [facility]    NUTRITION HISTORY:  Information obtained from chart review.   Comes from group home, where he was exhibiting aggressive behavior.   History of neurocognitive disorder r/t etoh, TBI. Also w/ chronic schizophrenia. Also with COPD, HLD, and is a smoker with no interest in quitting.   - Stable for discharge, though awaiting placement.     CURRENT DIET AND INTAKE:  Diet:  Regular             Eating 100% of meals received, ordering adequately TID.     ANTHROPOMETRICS:  Height: 6' 0\"  Weight:  154 lbs 5.15 oz (70 kg)   Body mass index is 20.93 kg/m .   Weight Status: Normal BMI  IBW:  80.9 kg   %IBW: 87%  Weight History:   Wt Readings from Last 10 Encounters:   09/09/20 70 kg (154 lb 5.2 oz)   08/26/20 70.3 kg (155 lb)   07/11/20 69.9 kg (154 lb)   06/27/20 70.3 kg (155 lb)   02/04/20 67.6 kg (149 lb)   01/20/20 69.2 kg (152 lb 9 oz)   09/03/18 78 kg (172 lb)       LABS/MEDS/PHYSICAL FINDINGS:  Reviewed    Last BM not noted  Bowel meds available     MALNUTRITION:  Visual Nutrition Focused Physical Assessment (NFPA) not completed due to restrictions on face-to-face patient care during COVID-19 Pandemic. Do not suspect muscle/fat losses.  Patient does not meet two of the criteria necessary for diagnosing malnutrition.     NUTRITION INTERVENTION:  Nutrition Diagnosis:  No nutrition diagnosis at this time.    Implementation:  Nutrition Education: Per Provider order if indicated, not appropriate at this time due to patient condition    FOLLOW UP/MONITORING:   Will re-evaluate in 7 - 10 days, or sooner, if re-consulted.      Fely Hernandes RD, LD  Pager: [phone]    "

## 2020-09-16 NOTE — PLAN OF CARE
DATE & TIME: 9/15-16/2020 2175-4762   Cognitive Concerns/ Orientation : A & O to self only (disoriented to time, place and situation); calm. Repeated requests for cola, cigarettes. Redirectable.   BEHAVIOR & AGGRESSION TOOL COLOR: Green   CIWA SCORE: N/A   ABNL VS/O2: VSS on RA   MOBILITY: independent in the room  PAIN MANAGMENT: denies pain this shift   DIET: Regular; tolerating well   BOWEL/BLADDER: continent, denies constipation.  ABNL LAB/BG: no new labs to review   DRAIN/DEVICES: no IV access   TELEMETRY RHYTHM: N/A  SKIN: Scattered bruising; intact   TESTS/PROCEDURES: N/A  D/C DAY/GOALS/PLACE: pending discharge   OTHER IMPORTANT INFO: Door alarm in place (pt. To be put on 72 hr. Hold if attempting to leave)

## 2020-09-16 NOTE — PROGRESS NOTES
[facility]    Hospitalist Progress Note      Assessment & Plan   John Juárez is a 54 year old male who was admitted on 9/9/2020 after aggressive behavior at group home, and left, and now they will not take him back. Pt is medically stable for discharge. Awaiting new placement. Appreciate social work assistance.      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/alcohol use disorder with behavioral disturbance   Chronic schizophrenia  Psych has seen, stable from psychiatric perspective with associated chronic cognitive deficits.   - Continue chronic psych meds - Benztropine, memantine, olanzapine, venlafaxine  - Appreciate social work assistance with new placement      HLD  -Continue ASA and atorvastatin      COPD (chronic obstructive pulmonary disease)   -Continue salmeterol      Smoker -- no interest in quitting    - nicotine patch     Hypothyroidism  - Continue Levothyroxine      DVT Prophylaxis: Pneumatic Compression Devices  Code Status: Full Code    Disposition: Expected discharge new placement available.    Beth Perez MD  Text Page  (7am to 6pm)    Interval History   Asking if he could go out and smoke.   Calm at time of visit    -Data reviewed today: I reviewed all new labs and imaging results over the last 24 hours. I personally reviewed no images or EKG's today.    Physical Exam   Temp: 97.3  F (36.3  C) Temp src: Oral BP: 111/79 Pulse: 72   Resp: 18 SpO2: 100 % O2 Device: None (Room air)    Vitals:    09/09/20 1754   Weight: 70 kg (154 lb 5.2 oz)     Vital Signs with Ranges  Temp:  [97.3  F (36.3  C)-97.4  F (36.3  C)] 97.3  F (36.3  C)  Pulse:  [72-76] 72  Resp:  [18] 18  BP: (111-136)/(79-90) 111/79  SpO2:  [98 %-100 %] 100 %  No intake/output data recorded.    Constitutional: Alert,awake, no apparent distress  Respiratory: Clear to auscultation bilaterally, no wheezing  Cardiovascular: regular rate and rhythm  GI: soft and non-tender      Medications       aspirin  81 mg  Oral Daily     atorvastatin  80 mg Oral At Bedtime     benztropine  0.5 mg Oral At Bedtime     docusate sodium  100 mg Oral BID     levothyroxine  88 mcg Oral Daily     memantine  5 mg Oral Daily     nicotine  1 patch Transdermal Daily     nicotine   Transdermal TID     OLANZapine  20 mg Oral Daily     polyethylene glycol  17 g Oral Daily     salmeterol  1 puff Inhalation BID     venlafaxine  75 mg Oral Daily with breakfast       Data   Recent Labs   Lab 09/09/20  1230   WBC 6.2   HGB 16.3   MCV 93         POTASSIUM 4.0   CHLORIDE 111*   CO2 25   BUN 12   CR 0.67   ANIONGAP 1*   LESLEE 8.3*   GLC 97   ALBUMIN 3.4   PROTTOTAL 7.6   BILITOTAL 0.3   ALKPHOS 99   ALT 36   AST 44       No results found for this or any previous visit (from the past 24 hour(s)).

## 2020-09-17 PROCEDURE — 99207 ZZC CDG-CODE CATEGORY CHANGED: CPT | Performed by: INTERNAL MEDICINE

## 2020-09-17 PROCEDURE — G0378 HOSPITAL OBSERVATION PER HR: HCPCS

## 2020-09-17 PROCEDURE — 25000132 ZZH RX MED GY IP 250 OP 250 PS 637: Performed by: INTERNAL MEDICINE

## 2020-09-17 PROCEDURE — 25000128 H RX IP 250 OP 636: Performed by: NURSE PRACTITIONER

## 2020-09-17 PROCEDURE — 25000132 ZZH RX MED GY IP 250 OP 250 PS 637: Performed by: NURSE PRACTITIONER

## 2020-09-17 PROCEDURE — 25000125 ZZHC RX 250

## 2020-09-17 PROCEDURE — 99207 ZZC NON-BILLABLE SERV PER CHARTING: CPT | Performed by: NURSE PRACTITIONER

## 2020-09-17 PROCEDURE — 96372 THER/PROPH/DIAG INJ SC/IM: CPT

## 2020-09-17 PROCEDURE — 99225 ZZC SUBSEQUENT OBSERVATION CARE,LEVEL II: CPT | Performed by: INTERNAL MEDICINE

## 2020-09-17 RX ORDER — OLANZAPINE 10 MG/1
10 TABLET ORAL ONCE
Status: COMPLETED | OUTPATIENT
Start: 2020-09-17 | End: 2020-09-17

## 2020-09-17 RX ORDER — LANOLIN ALCOHOL/MO/W.PET/CERES
3 CREAM (GRAM) TOPICAL
Status: DISCONTINUED | OUTPATIENT
Start: 2020-09-17 | End: 2021-06-30 | Stop reason: HOSPADM

## 2020-09-17 RX ORDER — WATER 10 ML/10ML
INJECTION INTRAMUSCULAR; INTRAVENOUS; SUBCUTANEOUS
Status: COMPLETED
Start: 2020-09-17 | End: 2020-09-17

## 2020-09-17 RX ADMIN — ATORVASTATIN CALCIUM 80 MG: 40 TABLET, FILM COATED ORAL at 22:15

## 2020-09-17 RX ADMIN — OLANZAPINE 10 MG: 10 TABLET, FILM COATED ORAL at 22:16

## 2020-09-17 RX ADMIN — DOCUSATE SODIUM 100 MG: 100 CAPSULE, LIQUID FILLED ORAL at 22:16

## 2020-09-17 RX ADMIN — SALMETEROL XINAFOATE 1 PUFF: 50 POWDER, METERED ORAL; RESPIRATORY (INHALATION) at 22:17

## 2020-09-17 RX ADMIN — BENZTROPINE MESYLATE 0.5 MG: 0.5 TABLET ORAL at 22:16

## 2020-09-17 RX ADMIN — WATER: 1 INJECTION INTRAMUSCULAR; INTRAVENOUS; SUBCUTANEOUS at 22:11

## 2020-09-17 RX ADMIN — OLANZAPINE 10 MG: 10 INJECTION, POWDER, FOR SOLUTION INTRAMUSCULAR at 20:19

## 2020-09-17 RX ADMIN — VENLAFAXINE HYDROCHLORIDE 75 MG: 75 CAPSULE, EXTENDED RELEASE ORAL at 08:30

## 2020-09-17 RX ADMIN — ASPIRIN 81 MG: 81 TABLET, COATED ORAL at 08:30

## 2020-09-17 RX ADMIN — MEMANTINE 5 MG: 5 TABLET ORAL at 08:30

## 2020-09-17 RX ADMIN — OLANZAPINE 10 MG: 5 TABLET, ORALLY DISINTEGRATING ORAL at 15:21

## 2020-09-17 RX ADMIN — LEVOTHYROXINE SODIUM 88 MCG: 88 TABLET ORAL at 08:30

## 2020-09-17 RX ADMIN — SALMETEROL XINAFOATE 1 PUFF: 50 POWDER, METERED ORAL; RESPIRATORY (INHALATION) at 08:36

## 2020-09-17 NOTE — PROGRESS NOTES
[facility]    Hospitalist Progress Note      Assessment & Plan   John Juárez is a 54 year old male who was admitted on 9/9/2020 after aggressive behavior at group home, and left, and now they will not take him back. Pt is medically stable for discharge. Awaiting new placement. Appreciate social work assistance.      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/alcohol use disorder with behavioral disturbance   Chronic schizophrenia  Psych has seen, stable from psychiatric perspective with associated chronic cognitive deficits.   - Continue chronic psych meds - Benztropine, memantine, olanzapine, venlafaxine  - Appreciate social work assistance with new placement      HLD  -Continue ASA and atorvastatin      COPD (chronic obstructive pulmonary disease)   -Continue salmeterol      Smoker - no interest in quitting    - nicotine patch     Hypothyroidism  - Continue Levothyroxine      DVT Prophylaxis: Pneumatic Compression Devices  Code Status: Full Code    Disposition: Expected discharge when new placement available.    Beth Perez MD  Text Page  (7am to 6pm)    Interval History   Telling me that he has multiple houses one being in [place], [place], a couple more in [facility] and many others.   He wants to go out and smoke. Discussed about use of nicotine.     -Data reviewed today: I reviewed all new labs and imaging results over the last 24 hours. I personally reviewed no images or EKG's today.    Physical Exam   Temp: 98.5  F (36.9  C) Temp src: Oral BP: 103/59 Pulse: 98   Resp: 18 SpO2: 96 % O2 Device: None (Room air)    Vitals:    09/09/20 1754   Weight: 70 kg (154 lb 5.2 oz)     Vital Signs with Ranges  Temp:  [97.5  F (36.4  C)-98.5  F (36.9  C)] 98.5  F (36.9  C)  Pulse:  [98-99] 98  Resp:  [16-18] 18  BP: (103-133)/(59-97) 103/59  SpO2:  [96 %-100 %] 96 %  I/O last 3 completed shifts:  In: 1590 [P.O.:1590]  Out: -     Constitutional: Alert,awake, no apparent  distress  Respiratory: Clear to auscultation bilaterally, no wheezing  Cardiovascular: regular rate and rhythm  GI: soft and non-tender      Medications       aspirin  81 mg Oral Daily     atorvastatin  80 mg Oral At Bedtime     benztropine  0.5 mg Oral At Bedtime     docusate sodium  100 mg Oral BID     levothyroxine  88 mcg Oral Daily     memantine  5 mg Oral Daily     nicotine  1 patch Transdermal Daily     nicotine   Transdermal TID     OLANZapine  20 mg Oral Daily     polyethylene glycol  17 g Oral Daily     salmeterol  1 puff Inhalation BID     venlafaxine  75 mg Oral Daily with breakfast       Data   No lab results found in last 7 days.    No results found for this or any previous visit (from the past 24 hour(s)).

## 2020-09-17 NOTE — PLAN OF CARE
DATE & TIME: 9/17/20    Cognitive Concerns/ Orientation : Alert to self only, does know [place] president.   BEHAVIOR & AGGRESSION TOOL COLOR: Yellow d/t hx of aggression.   CIWA SCORE: N/A   ABNL VS/O2: VSS on RA   MOBILITY: Ind in room.   PAIN MANAGMENT: denies   DIET: Regular; excellent appetite.   BOWEL/BLADDER: Continent  ABNL LAB/BG: no new recent labs   DRAIN/DEVICES: N/A  TELEMETRY RHYTHM: N/A  SKIN: Bruising, intact.   TESTS/PROCEDURES: N/A  D/C DAY/GOALS/PLACE: Pending placement found, SW following.  OTHER IMPORTANT INFO: Court hold/ on commitment, door alarm on. Did not set off door alarm, using call light approp. Took a bath ind. Asked to go outside to smoke, refused nicotine patch.

## 2020-09-17 NOTE — PLAN OF CARE
DATE & TIME: 9/16-9/17/20 Night shift   Cognitive Concerns/ Orientation : A & O x 1 (self only); walks around the room frequently; sets off door alarm frequently, wanting to go out and smoke   BEHAVIOR & AGGRESSION TOOL COLOR: Green   CIWA SCORE: N/A   ABNL VS/O2: VSS on RA   MOBILITY: independent in the room   PAIN MANAGMENT: denies   DIET: Regular; tolerating well. Very hungry  BOWEL/BLADDER: BS active x 4; continent   ABNL LAB/BG: no new recent labs   DRAIN/DEVICES: N/A  TELEMETRY RHYTHM: N/A  SKIN: Scattered bruising; scar down abdomin; intact   TESTS/PROCEDURES: N/A  D/C DAY/GOALS/PLACE: pending discharge; placement   OTHER IMPORTANT INFO: Vitals daily; if  attempts to leave, will need to be placed on 72 hr hold; door alarm in place; Nursing will continue to monitor and assess

## 2020-09-17 NOTE — PLAN OF CARE
DATE & TIME:9/16/2020; 7084-3315    Cognitive Concerns/ Orientation : A & O x 1 (self only); walks around the room frequently; sets off call light frequently after pt. Education, wanting to go out and smoke   BEHAVIOR & AGGRESSION TOOL COLOR: Green   CIWA SCORE: N/A   ABNL VS/O2: VSS on RA   MOBILITY: independent in the room   PAIN MANAGMENT: denies   DIET: Regular; tolerating well   BOWEL/BLADDER: BS active x 4; continent   ABNL LAB/BG: no new recent labs   DRAIN/DEVICES: N/A  TELEMETRY RHYTHM: N/A  SKIN: Scattered bruising; scar down abdomin; intact   TESTS/PROCEDURES: N/A  D/C DAY/GOALS/PLACE: pending discharge; placement   OTHER IMPORTANT INFO: Vitals daily; pt. Requesting to go outside and smoke occasionally, encouraged nicotine gum/patches; provided distraction activities (TV, crossword puzzles, coloring activities, wash face etc.); if pt. Attempts to leave, will need to be placed on 72 hr hold; door alarm in place; Nursing will continue to monitor and assess     MD/RN ROUNDING SIGNED OFF D/E SHIFT: N/A  COMMIT TO SIT DONE AND SIGNED OFF: COMPLETE

## 2020-09-18 PROCEDURE — 25000128 H RX IP 250 OP 636: Performed by: NURSE PRACTITIONER

## 2020-09-18 PROCEDURE — 99225 ZZC SUBSEQUENT OBSERVATION CARE,LEVEL II: CPT | Performed by: INTERNAL MEDICINE

## 2020-09-18 PROCEDURE — 25000132 ZZH RX MED GY IP 250 OP 250 PS 637: Performed by: INTERNAL MEDICINE

## 2020-09-18 PROCEDURE — G0378 HOSPITAL OBSERVATION PER HR: HCPCS

## 2020-09-18 PROCEDURE — 96372 THER/PROPH/DIAG INJ SC/IM: CPT

## 2020-09-18 PROCEDURE — 25000125 ZZHC RX 250

## 2020-09-18 RX ORDER — OLANZAPINE 5 MG/1
15 TABLET ORAL 3 TIMES DAILY
Status: DISCONTINUED | OUTPATIENT
Start: 2020-09-18 | End: 2020-09-19

## 2020-09-18 RX ORDER — WATER 10 ML/10ML
INJECTION INTRAMUSCULAR; INTRAVENOUS; SUBCUTANEOUS
Status: COMPLETED
Start: 2020-09-18 | End: 2020-09-18

## 2020-09-18 RX ORDER — OLANZAPINE 15 MG/1
15 TABLET ORAL 3 TIMES DAILY
Status: DISCONTINUED | OUTPATIENT
Start: 2020-09-18 | End: 2020-09-18

## 2020-09-18 RX ADMIN — LEVOTHYROXINE SODIUM 88 MCG: 88 TABLET ORAL at 08:18

## 2020-09-18 RX ADMIN — MEMANTINE 5 MG: 5 TABLET ORAL at 08:18

## 2020-09-18 RX ADMIN — ATORVASTATIN CALCIUM 80 MG: 40 TABLET, FILM COATED ORAL at 21:20

## 2020-09-18 RX ADMIN — DOCUSATE SODIUM 100 MG: 100 CAPSULE, LIQUID FILLED ORAL at 21:20

## 2020-09-18 RX ADMIN — OLANZAPINE 10 MG: 10 INJECTION, POWDER, FOR SOLUTION INTRAMUSCULAR at 10:44

## 2020-09-18 RX ADMIN — WATER 10 ML: 1 INJECTION INTRAMUSCULAR; INTRAVENOUS; SUBCUTANEOUS at 11:05

## 2020-09-18 RX ADMIN — OLANZAPINE 15 MG: 5 TABLET, FILM COATED ORAL at 21:20

## 2020-09-18 RX ADMIN — POLYETHYLENE GLYCOL 3350 17 G: 17 POWDER, FOR SOLUTION ORAL at 08:18

## 2020-09-18 RX ADMIN — ASPIRIN 81 MG: 81 TABLET, COATED ORAL at 08:18

## 2020-09-18 RX ADMIN — DOCUSATE SODIUM 100 MG: 100 CAPSULE, LIQUID FILLED ORAL at 08:18

## 2020-09-18 RX ADMIN — SALMETEROL XINAFOATE 1 PUFF: 50 POWDER, METERED ORAL; RESPIRATORY (INHALATION) at 21:19

## 2020-09-18 RX ADMIN — BENZTROPINE MESYLATE 0.5 MG: 0.5 TABLET ORAL at 21:20

## 2020-09-18 RX ADMIN — VENLAFAXINE HYDROCHLORIDE 75 MG: 75 CAPSULE, EXTENDED RELEASE ORAL at 08:18

## 2020-09-18 NOTE — PROVIDER NOTIFICATION
Paged House Officer Natanael Thakkar.     Pt has scheduled bedtime Zyprexa 20 mg. Spoke with pharmacist if Pt can still get that given his recent PRNs. Said maybe just do a one time dose of 10 mg instead? Said should ask provider. Thanks

## 2020-09-18 NOTE — PLAN OF CARE
DATE & TIME: 9/17-9/18/20 Night shift   Cognitive Concerns/ Orientation : Alert to self only. Ca be restless, agitated, verbally abusive- slept most of shift and was cooperative   BEHAVIOR & AGGRESSION TOOL COLOR: Yellow d/t hx of aggression.   CIWA SCORE: N/A   ABNL VS/O2: VSS on room air- done daily   MOBILITY: Ind in room.   PAIN MANAGMENT: denies   DIET: Regular; excellent appetite.   BOWEL/BLADDER: BS active x 4; continent.   ABNL LAB/BG: no new recent labs   DRAIN/DEVICES: N/A  TELEMETRY RHYTHM: N/A  SKIN: Bruising, intact.   TESTS/PROCEDURES: N/A  D/C DAY/GOALS/PLACE: Pending placement.  OTHER IMPORTANT INFO: Court hold/ on commitment, door alarm on.

## 2020-09-18 NOTE — PROGRESS NOTES
Pt became verbally aggressive this AM.  Stating he wants to leave now.  Attempted multiple redirections.  Pacing room.  Threw crackers and pop.  Swearing.  Unable to redirect.  Pt called police multiple times.  Fidel montesinos called.  Also unable to redirect.  Code 21 called.  IM zyprexa given.  Pt calm at this time.  Psych consult ordered.

## 2020-09-18 NOTE — PROGRESS NOTES
[facility]    Hospitalist Progress Note      Assessment & Plan   John Juárez is a 54 year old male who was admitted on 9/9/2020 after aggressive behavior at group home, and left, and now they will not take him back. Pt is medically stable for discharge. Awaiting new placement. Appreciate social work assistance.      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/alcohol use disorder with behavioral disturbance   Chronic schizophrenia  - has had increased agitation since yesterday and Code 21 have been called x2,  IM Zyprexa used both times  - re consult psych today--> currently recommending changing Zyprexa from 20mg daily to 15mg TID  - Continue other chronic psych meds - Benztropine, memantine,venlafaxine  - Appreciate social work assistance with new placement   - patient is currently under civil commitment through [facility] until 7/31/20     HLD  -Continue ASA and atorvastatin      COPD (chronic obstructive pulmonary disease)   -Continue salmeterol      Smoker - no interest in quitting    - nicotine patch     Hypothyroidism  - Continue Levothyroxine      DVT Prophylaxis: Pneumatic Compression Devices  Code Status: Full Code    Disposition: Expected discharge when new placement available.    Beth Perez MD  Text Page  (7am to 6pm)    Interval History    Increased agitation- code 21 last night and today. Has received IM zprexa.    At time of visit, patient calm and resting in his bed. Denies pain.      -Data reviewed today: I reviewed all new labs and imaging results over the last 24 hours. I personally reviewed no images or EKG's today.    Physical Exam   Temp: 98.3  F (36.8  C) Temp src: Oral BP: (!) 135/95 Pulse: 95   Resp: 16 SpO2: 100 % O2 Device: None (Room air)    Vitals:    09/09/20 1754   Weight: 70 kg (154 lb 5.2 oz)     Vital Signs with Ranges  Temp:  [97.6  F (36.4  C)-98.3  F (36.8  C)] 98.3  F (36.8  C)  Pulse:  [86-95] 95  Resp:  [16-20] 16  BP: (123-135)/(88-95)  135/95  SpO2:  [100 %] 100 %  I/O last 3 completed shifts:  In: 720 [P.O.:720]  Out: -     Constitutional: Alert,awake, no apparent distress  Respiratory: Clear to auscultation bilaterally, no wheezing  Cardiovascular: regular rate and rhythm  GI: soft and non-tender      Medications       aspirin  81 mg Oral Daily     atorvastatin  80 mg Oral At Bedtime     benztropine  0.5 mg Oral At Bedtime     docusate sodium  100 mg Oral BID     levothyroxine  88 mcg Oral Daily     memantine  5 mg Oral Daily     nicotine  1 patch Transdermal Daily     nicotine   Transdermal TID     OLANZapine  20 mg Oral Daily     polyethylene glycol  17 g Oral Daily     salmeterol  1 puff Inhalation BID     venlafaxine  75 mg Oral Daily with breakfast       Data   No lab results found in last 7 days.    No results found for this or any previous visit (from the past 24 hour(s)).
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
## 2020-09-18 NOTE — PLAN OF CARE
DATE & TIME: 9/17/20 5744-5613   Cognitive Concerns/ Orientation : Alert to self only. Restless, agitated, verbally abusive.   BEHAVIOR & AGGRESSION TOOL COLOR: Yellow d/t hx of aggression.   CIWA SCORE: N/A   ABNL VS/O2: VSS on room air.   MOBILITY: Ind in room.   PAIN MANAGMENT: denies   DIET: Regular; excellent appetite.   BOWEL/BLADDER: BS active x 4; continent.   ABNL LAB/BG: no new recent labs   DRAIN/DEVICES: N/A  TELEMETRY RHYTHM: N/A  SKIN: Bruising, intact.   TESTS/PROCEDURES: N/A  D/C DAY/GOALS/PLACE: Pending placement.  OTHER IMPORTANT INFO: Court hold/ on commitment, door alarm on. Restless/agitated at start of shift, willingly took PRN Zyprexa. Code 21 this shift, see note for more details.
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
## 2020-09-18 NOTE — CODE/RAPID RESPONSE
[facility] brief CODE 21 Note:    CODE 21 paged at 1959 as pt out of his room, ambulating in the hallway, stating that his commitment is over and that he is leaving the hospital.  Upon arrival, pt sitting in his room, in no apparent distress, with occasional verbal, angry outbursts with gesturing of sticking fist into his other fist (apparently not directed toward staff).  Nursing reports was able to redirect pt back to room to discuss his commitment paperwork; however, at that time, pt became further agitated as was told his commitment continues through July 31, 2021.  With assistance of nursing, security and psychiatry associates, pt received IM zyprexa without complication.      Did note pt's formal civil commitment paper present in paper in chart that continues through July 31, 2021.  Noted medications ok to administer in last paragraph, including zyprexa recently administered.      Did not enter pt's room as several staff already present, and pt compliant with plan of care.  Please contact [facility] if further assistance is needed.    Addendum: 2200: Was paged by nursing regarding HS dose of zyprexa in lieu of recent PRN zyprexa dosing.  After noting UpToDate recommendations for schizophrenia zyprexa dosing, maximum dose of 30 mg/day with least amount of associated adverse effects.  After discussion with nursing, pt resting in bed, requested evening medications, appears calm at this time.  Requested for nursing to hold HS 20 mg PO zyprexa and administer 10 mg PO zyprexa.  Pt to resume 20 mg zyprexa HS dosing tomorrow evening 9/18.        MAIA Newell, CNP  [facility]    No charge.

## 2020-09-18 NOTE — PLAN OF CARE
DATE & TIME: 9/18/2020 0602-2217   Cognitive Concerns/ Orientation : Disoriented to time, place, situation   BEHAVIOR & AGGRESSION TOOL COLOR: Yellow due to aggression  CIWA SCORE:NA  ABNL VS/O2: WNL  MOBILITY: Ind  PAIN MANAGMENT: Denies  DIET: Regular  BOWEL/BLADDER: Continent  ABNL LAB/BG: WNL  DRAIN/DEVICES: NA  TELEMETRY RHYTHM: NA  SKIN: Intact.  Pt refused full skin assessment  TESTS/PROCEDURES: NA  D/C DAY/GOALS/PLACE: Pending placement  OTHER IMPORTANT INFO: Code 21 called on pt today due to aggression. IM zyprexa given.  MD ordered psych to reassess.  Psych will see pt over the weekend.  Zyprexa order changed to TID.  Attempts to redirect include word searches, hand held video games, music, television, emotional support, listening.  Pt appears to become more agitated when hungry.  Will order room service to check in and get meals ordered on a schedule.  Order also placed for Safe tray due to patient throwing objects earlier in the shift  MD/RN ROUNDING SIGNED OFF D/E SHIFT: Y  COMMIT TO SIT DONE AND SIGNED OFF Y

## 2020-09-18 NOTE — PROGRESS NOTES
Pt getting agitated and more restless. Left room and walking the cruz saying he is leaving and his hold is over. Was able to be redirected back to room. Security called to be there while the Pt was re-explained to that his hold is not done until July 2021. Pt became more agitated and verbally abusive. Code 21 was called. PRN IM Zyprexa 10 mg given. No restraints orders for now, will continue to watch. If Pt comes out of the room again, call Security or Code Green, will then consider restraints again. Would most likely require locked restraints and RN sitter. Will continue to monitor.

## 2020-09-19 PROBLEM — R45.1 AGITATION: Status: ACTIVE | Noted: 2020-09-19

## 2020-09-19 PROCEDURE — 25000132 ZZH RX MED GY IP 250 OP 250 PS 637: Performed by: INTERNAL MEDICINE

## 2020-09-19 PROCEDURE — 25000128 H RX IP 250 OP 636: Performed by: NURSE PRACTITIONER

## 2020-09-19 PROCEDURE — 25000125 ZZHC RX 250

## 2020-09-19 PROCEDURE — 25000132 ZZH RX MED GY IP 250 OP 250 PS 637: Performed by: PSYCHIATRY & NEUROLOGY

## 2020-09-19 PROCEDURE — 12000000 ZZH R&B MED SURG/OB

## 2020-09-19 PROCEDURE — 99233 SBSQ HOSP IP/OBS HIGH 50: CPT | Mod: GT | Performed by: PSYCHIATRY & NEUROLOGY

## 2020-09-19 PROCEDURE — G0378 HOSPITAL OBSERVATION PER HR: HCPCS

## 2020-09-19 PROCEDURE — 99207 ZZC CDG-MDM COMPONENT: MEETS MODERATE - UP CODED: CPT | Performed by: INTERNAL MEDICINE

## 2020-09-19 PROCEDURE — 99232 SBSQ HOSP IP/OBS MODERATE 35: CPT | Performed by: INTERNAL MEDICINE

## 2020-09-19 PROCEDURE — 99207 ZZC NON-BILLABLE SERV PER CHARTING: CPT | Performed by: NURSE PRACTITIONER

## 2020-09-19 RX ORDER — WATER 10 ML/10ML
INJECTION INTRAMUSCULAR; INTRAVENOUS; SUBCUTANEOUS
Status: COMPLETED
Start: 2020-09-19 | End: 2020-09-19

## 2020-09-19 RX ORDER — OLANZAPINE 10 MG/2ML
10 INJECTION, POWDER, FOR SOLUTION INTRAMUSCULAR ONCE
Status: DISCONTINUED | OUTPATIENT
Start: 2020-09-19 | End: 2020-09-21

## 2020-09-19 RX ORDER — OLANZAPINE 10 MG/1
10 TABLET ORAL 3 TIMES DAILY
Status: DISCONTINUED | OUTPATIENT
Start: 2020-09-19 | End: 2020-09-20

## 2020-09-19 RX ADMIN — DOCUSATE SODIUM 100 MG: 100 CAPSULE, LIQUID FILLED ORAL at 22:56

## 2020-09-19 RX ADMIN — BENZTROPINE MESYLATE 0.5 MG: 0.5 TABLET ORAL at 22:56

## 2020-09-19 RX ADMIN — POLYETHYLENE GLYCOL 3350 17 G: 17 POWDER, FOR SOLUTION ORAL at 08:30

## 2020-09-19 RX ADMIN — VENLAFAXINE HYDROCHLORIDE 75 MG: 75 CAPSULE, EXTENDED RELEASE ORAL at 08:30

## 2020-09-19 RX ADMIN — SALMETEROL XINAFOATE 1 PUFF: 50 POWDER, METERED ORAL; RESPIRATORY (INHALATION) at 08:32

## 2020-09-19 RX ADMIN — MEMANTINE 5 MG: 5 TABLET ORAL at 08:30

## 2020-09-19 RX ADMIN — ASPIRIN 81 MG: 81 TABLET, COATED ORAL at 08:29

## 2020-09-19 RX ADMIN — WATER: 1 INJECTION INTRAMUSCULAR; INTRAVENOUS; SUBCUTANEOUS at 21:13

## 2020-09-19 RX ADMIN — OLANZAPINE 10 MG: 10 INJECTION, POWDER, FOR SOLUTION INTRAMUSCULAR at 21:13

## 2020-09-19 RX ADMIN — OLANZAPINE 10 MG: 10 TABLET, FILM COATED ORAL at 13:52

## 2020-09-19 RX ADMIN — LEVOTHYROXINE SODIUM 88 MCG: 88 TABLET ORAL at 08:30

## 2020-09-19 RX ADMIN — ATORVASTATIN CALCIUM 80 MG: 40 TABLET, FILM COATED ORAL at 22:56

## 2020-09-19 RX ADMIN — SALMETEROL XINAFOATE 1 PUFF: 50 POWDER, METERED ORAL; RESPIRATORY (INHALATION) at 22:55

## 2020-09-19 RX ADMIN — OLANZAPINE 10 MG: 5 TABLET, ORALLY DISINTEGRATING ORAL at 17:25

## 2020-09-19 RX ADMIN — OLANZAPINE 15 MG: 5 TABLET, FILM COATED ORAL at 08:29

## 2020-09-19 RX ADMIN — DOCUSATE SODIUM 100 MG: 100 CAPSULE, LIQUID FILLED ORAL at 08:30

## 2020-09-19 ASSESSMENT — ACTIVITIES OF DAILY LIVING (ADL)
ADLS_ACUITY_SCORE: 14
ADLS_ACUITY_SCORE: 14

## 2020-09-19 NOTE — PLAN OF CARE
DATE & TIME: 9/19/20 11a to 3p  Cognitive Concerns/ Orientation : Alert to self only. Can be restless, agitated, verbally abusive.   BEHAVIOR & AGGRESSION TOOL COLOR: Green, Yellow d/t hx of aggression.   CIWA SCORE: N/A   ABNL VS/O2: VSS on room air- done daily   MOBILITY: Ind in room.   PAIN MANAGMENT: denies   DIET: Regular; excellent appetite, wants diet pop  BOWEL/BLADDER: continent  ABNL LAB/BG: no new recent labs   DRAIN/DEVICES: N/A  TELEMETRY RHYTHM: N/A  SKIN: intact as per report, refused skin check on this shift  TESTS/PROCEDURES: N/A  D/C DAY/GOALS/PLACE: Pending placement.  OTHER IMPORTANT INFO: Court hold/ on commitment, door alarm on, patient now knows how to put it off, patient easily redirected

## 2020-09-19 NOTE — CONSULTS
[facility] Psychiatric Consult Progress Note    Interval History:   Pt seen, chart reviewed, case discussed with nursing staff and treating clinicians. Patient had episodic behavioral challenges the past two days. On 9/17 had two codes called and another yesterday. Refer to notes for details. Nursing reached out ot me and I advised adjusting Zyprexa to 15 mg bid (was receiving 20 mg day), though this must have been misunderstood as it appears he's now receiving 15 mg tid. Charting from nursing reports variable verbal abuse with agitation. Agitation not charted in any detail, though no codes since adjustments were made. Patient remains under commitment and SW is assisting in navigating placement. On my interview today the patient is calm and pleasant, though doesn't offer much insight into any of the events. He tends to grumble and be hard to understand. He asked me the date as he thought it was June. Denies any HI/SI or hallucinosis. Endorses no complaints. He doesn't appear overly affected in a negative manner with respect to meds, though I advised him of the plan to pull back a bit on the Zyprexa.       Review of systems:   10 point Review of Systems completed by Ubaldo Ho DO, and is  is negative other than noted in the HPI     Medications:       aspirin  81 mg Oral Daily     atorvastatin  80 mg Oral At Bedtime     benztropine  0.5 mg Oral At Bedtime     docusate sodium  100 mg Oral BID     levothyroxine  88 mcg Oral Daily     memantine  5 mg Oral Daily     nicotine  1 patch Transdermal Daily     nicotine   Transdermal TID     OLANZapine  15 mg Oral TID     polyethylene glycol  17 g Oral Daily     salmeterol  1 puff Inhalation BID     venlafaxine  75 mg Oral Daily with breakfast     acetaminophen, albuterol, alum & mag hydroxide-simethicone, haloperidol, melatonin, naloxone, nicotine, OLANZapine, OLANZapine zydis, ondansetron **OR** ondansetron    Mental Status Examination:     Appearance:   awake, alert  Eye Contact:  good  Speech:  difficult to understand, garbled  Language: Normal  Psychomotor Behavior:  no evidence of tardive dyskinesia, dystonia, or tics  Mood:  Stable, calm  Affect:  appropriate , restricted  Thought Process:  paucity  Thought Content:  no evidence of suicidal ideation or homicidal ideation and no evidence of response to internal stimuli   Oriented to:  Person and place  Attention Span and Concentration:  fair  Recent and Remote Memory:  limited  Fund of Knowledge: delayed  Insight:  limited  Judgment:  limited         Labs/Vitals:   No results found for this or any previous visit (from the past 24 hour(s)).  B/P: 143/100, T: 97.6, P: 81, R: 17        DIagnoses:   1.  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   2.  Other schizophrenia spectrum disorder.   3.  Alcohol use disorder in remission.   4.  Stimulant use disorder, in remission.   5.  Chronic obstructive pulmonary disease.   6.  Hypertension.          Plan:   1. Reduce olanzapine to 10 mg tid  2. Patient under commitment; SW in talks with his  in finding placement      Attestation:  Patient has been seen and evaluated by me,  Ubaldo Ho,      Visit/Communication Style   VIRTUAL (VIDEO) communication was used to evaluate John due to the COVID pandemic.    John consented to the use of video communication: yes  Video START time: 9:26 am, 9/19/2020  Video STOP time: 9:29 am, 9/19/2020   Patient's location: [facility]   Provider's location during the visit: Home
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
## 2020-09-19 NOTE — PLAN OF CARE
DATE & TIME: 9/18 from 2300 to 0730   Cognitive Concerns/ Orientation : A&O x 4   BEHAVIOR & AGGRESSION TOOL COLOR: Green to yellow- has been verbally abusive, and agitated.   CIWA SCORE: N/A   ABNL VS/O2: daily VS   MOBILITY: Up and independent   PAIN MANAGMENT: Denied   DIET: Regular  BOWEL/BLADDER: Continent to B/B  ABNL LAB/BG: Cl 111, Ca 8.3,   DRAIN/DEVICES: No IV access  TELEMETRY RHYTHM: N/A  SKIN: Intact  TESTS/PROCEDURES: N/A  D/C DAY/GOALS/PLACE: on Court hold, waiting for placement  OTHER IMPORTANT INFO: Pt is agitated at times. Door alarm in placed.   MD/RN ROUNDING SIGNED OFF D/E SHIFT: N/A  COMMIT TO SIT DONE AND SIGNED OFF Yes

## 2020-09-19 NOTE — PLAN OF CARE
DATE & TIME: 9/18/20 6971-6608   Cognitive Concerns/ Orientation : Alert to self only. Can be restless, agitated, verbally abusive. Has been calm/cooperative this shift.   BEHAVIOR & AGGRESSION TOOL COLOR: Green, Yellow d/t hx of aggression.   CIWA SCORE: N/A   ABNL VS/O2: VSS on room air- done daily   MOBILITY: Ind in room.   PAIN MANAGMENT: denies   DIET: Regular; excellent appetite.   BOWEL/BLADDER: BS active x 4; continent.   ABNL LAB/BG: no new recent labs   DRAIN/DEVICES: N/A  TELEMETRY RHYTHM: N/A  SKIN: Bruising, intact.   TESTS/PROCEDURES: N/A  D/C DAY/GOALS/PLACE: Pending placement.  OTHER IMPORTANT INFO: Court hold/ on commitment, door alarm on. Code 21 this AM.   COMMIT TO SIT DONE AND SIGNED OFF: Yes
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
## 2020-09-19 NOTE — PROGRESS NOTES
"Writer heard door alarm going off and then immediately stop. Writer saw pt in hallway after turning his door alarm off himself. Pt redirectable into room, cooperative. Pt stating \"well my rides here\". Writer informed pt his ride was not here. Writer re-established boundaries with pt and informed him to remain in his room and utilize his call light. Pt stated \"okay I can do that\". Security on unit already and came to bedside to assist writer. Pt agreeable and cooperative. Writer took lunch order and will order his food. Door alarm activated and audible. Primary RN notified. Charge Nurse notified. Continue to monitor.   "

## 2020-09-20 PROCEDURE — 99232 SBSQ HOSP IP/OBS MODERATE 35: CPT | Performed by: INTERNAL MEDICINE

## 2020-09-20 PROCEDURE — 99207 ZZC CDG-MDM COMPONENT: MEETS MODERATE - UP CODED: CPT | Performed by: INTERNAL MEDICINE

## 2020-09-20 PROCEDURE — 25000132 ZZH RX MED GY IP 250 OP 250 PS 637: Performed by: NURSE PRACTITIONER

## 2020-09-20 PROCEDURE — 12000000 ZZH R&B MED SURG/OB

## 2020-09-20 PROCEDURE — 25000132 ZZH RX MED GY IP 250 OP 250 PS 637: Performed by: PSYCHIATRY & NEUROLOGY

## 2020-09-20 PROCEDURE — 99233 SBSQ HOSP IP/OBS HIGH 50: CPT | Mod: GT | Performed by: PSYCHIATRY & NEUROLOGY

## 2020-09-20 PROCEDURE — 25000132 ZZH RX MED GY IP 250 OP 250 PS 637: Performed by: INTERNAL MEDICINE

## 2020-09-20 RX ORDER — HALOPERIDOL 5 MG/1
5 TABLET ORAL 2 TIMES DAILY
Status: DISCONTINUED | OUTPATIENT
Start: 2020-09-20 | End: 2020-09-21

## 2020-09-20 RX ORDER — BENZTROPINE MESYLATE 0.5 MG/1
0.5 TABLET ORAL 2 TIMES DAILY
Status: DISCONTINUED | OUTPATIENT
Start: 2020-09-20 | End: 2020-09-29

## 2020-09-20 RX ADMIN — BENZTROPINE MESYLATE 0.5 MG: 0.5 TABLET ORAL at 21:23

## 2020-09-20 RX ADMIN — HALOPERIDOL 5 MG: 5 TABLET ORAL at 16:15

## 2020-09-20 RX ADMIN — MEMANTINE 5 MG: 5 TABLET ORAL at 09:47

## 2020-09-20 RX ADMIN — NICOTINE POLACRILEX 2 MG: 2 GUM, CHEWING ORAL at 08:32

## 2020-09-20 RX ADMIN — OLANZAPINE 10 MG: 10 TABLET, FILM COATED ORAL at 06:00

## 2020-09-20 RX ADMIN — HALOPERIDOL 5 MG: 5 TABLET ORAL at 21:23

## 2020-09-20 RX ADMIN — DOCUSATE SODIUM 100 MG: 100 CAPSULE, LIQUID FILLED ORAL at 09:47

## 2020-09-20 RX ADMIN — ASPIRIN 81 MG: 81 TABLET, COATED ORAL at 09:46

## 2020-09-20 RX ADMIN — LEVOTHYROXINE SODIUM 88 MCG: 88 TABLET ORAL at 09:47

## 2020-09-20 RX ADMIN — ATORVASTATIN CALCIUM 80 MG: 40 TABLET, FILM COATED ORAL at 21:22

## 2020-09-20 RX ADMIN — OLANZAPINE 10 MG: 5 TABLET, ORALLY DISINTEGRATING ORAL at 09:48

## 2020-09-20 RX ADMIN — VENLAFAXINE HYDROCHLORIDE 75 MG: 75 CAPSULE, EXTENDED RELEASE ORAL at 09:47

## 2020-09-20 RX ADMIN — SALMETEROL XINAFOATE 1 PUFF: 50 POWDER, METERED ORAL; RESPIRATORY (INHALATION) at 09:49

## 2020-09-20 RX ADMIN — NICOTINE POLACRILEX 2 MG: 2 GUM, CHEWING ORAL at 21:26

## 2020-09-20 RX ADMIN — DOCUSATE SODIUM 100 MG: 100 CAPSULE, LIQUID FILLED ORAL at 21:23

## 2020-09-20 ASSESSMENT — ACTIVITIES OF DAILY LIVING (ADL)
ADLS_ACUITY_SCORE: 14

## 2020-09-20 NOTE — PLAN OF CARE
DATE & TIME: 9/20/20 1488-8629  Cognitive Concerns/ Orientation : Alert to self only. restless, agitated, verbally abusive in the morning. Stating that its 2029 and we held him for 9 years. Tool prn zyprexa with late breakfast food. Sleeping in the afternoon.  BEHAVIOR & AGGRESSION TOOL COLOR: Yellow d/t hx of aggression.   CIWA SCORE: N/A   ABNL VS/O2: VSS on room air- done daily   MOBILITY: Ind in room.   PAIN MANAGMENT: Denies   DIET: Regular; excellent appetite, wants lots of cola and snacks.   BOWEL/BLADDER: Continent  ABNL LAB/BG: no new recent labs   DRAIN/DEVICES: N/A  TELEMETRY RHYTHM: N/A  SKIN: Refused skin check on this shift  TESTS/PROCEDURES: N/A  D/C DAY/GOALS/PLACE: Pending placement. Reassessed by Psych that d/c'ed zyprexa and ordered haldol scheduled   OTHER IMPORTANT INFO: Court hold/ on commitment, door alarm on, patient now knows how to turn it off. COMMIT TO SIT DONE AND SIGNED OFF: Yes

## 2020-09-20 NOTE — PROGRESS NOTES
"Pt came out of room walking quickly down the hallway. Not able to be redirected back to room. Went to Nurse's Station. Picked up the phone and called 911. Pt was able to be redirected back to room. Yelling \"I am going to get the F out of here. My ride is here downstairs\". Pt is still confused on the date and thinks his commitment is over. Has been saying all shift that the  are here to arrest him and he needs to go downstairs. Called Code Green to see if Pt could be talked to, no scheduled Zyprexa could be given yet, PRN oral Zyprexa had just been given at 5:30 pm. Pt was angry and agitated, yelling at staff. Pt was hallucinating, talking to a  about his ride and when they were coming. Because Pt was not able to be calmed down, Code 21 was called. 10 mg IM Zyprexa given. Pt staying in room, but continues to open door to set door alarm off.     Per nursing aid, Pt has been talking to a person named Goyo in his room.     Will continue to monitor.   "

## 2020-09-20 NOTE — PLAN OF CARE
On court hold. Alert to self only. Restless. Slept most of night. Denies pain. VSS on RA and only once daily. Tolerating regular diet with good appetite. Requires behavioral tray. Up independent in the room. Door alarm on. No IV. Skin intact. Voiding adequately. Per pt, no BM this shift. Discharge pending. Per SW note, pt requiring locked secured facility.
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
## 2020-09-20 NOTE — PLAN OF CARE
"DATE & TIME: 9/19/20 3824-8368  Cognitive Concerns/ Orientation : Alert to self only. Can be restless, agitated, verbally abusive. Has been saying that the  are downstairs to arrest him. Hallucinating - Spoke to a \"\" about when they were coming to pick him up during the code 21, talking to a man named Goyo in his room.  BEHAVIOR & AGGRESSION TOOL COLOR: Yellow d/t hx of aggression.   CIWA SCORE: N/A   ABNL VS/O2: VSS on room air- done daily   MOBILITY: Ind in room.   PAIN MANAGMENT: Denies   DIET: Regular; excellent appetite, wants lots of cola and snacks.   BOWEL/BLADDER: Continent  ABNL LAB/BG: no new recent labs   DRAIN/DEVICES: N/A  TELEMETRY RHYTHM: N/A  SKIN: Refused skin check on this shift  TESTS/PROCEDURES: N/A  D/C DAY/GOALS/PLACE: Pending placement.  OTHER IMPORTANT INFO: Court hold/ on commitment, door alarm on, patient now knows how to turn it off. Code 21 called this shift.   COMMIT TO SIT DONE AND SIGNED OFF: Yes  "

## 2020-09-20 NOTE — CONSULTS
"[facility] Psychiatric Consult Progress Note    Interval History:   Patient receiving high doses Zyprexa (10 mg tid) though not demonstrating adequate response over the preceding days. Last night another Code was called. Combative, unwilling to take meds. Received IM olanzapine. Continues to demonstrate aberrant thoughts - delusions, paranoia, response to internal stimuli (\"talking to a man named Goyo in his room\" per nursing). On my interview today patient just grumble, doesn't really respond to me other than to say \"who are you,\" then walks out of frame of the camera.    In reviewing recent discharge summary (2/2020) from Dr. Batres, similar issues emerged at that time and he was reportedly more responsive to Haldol (7 mg bid) than Zyprexa (which was charted as not mitigating symptoms well). There was some indication of tremor which reportedly recede the Haldol, though Cogentin was utilized to assist as well (0.5 mg bid).        Review of systems:   10 point Review of Systems completed by Ubaldo Ho DO and royer  is negative other than noted in the HPI     Medications:       aspirin  81 mg Oral Daily     atorvastatin  80 mg Oral At Bedtime     benztropine  0.5 mg Oral At Bedtime     docusate sodium  100 mg Oral BID     levothyroxine  88 mcg Oral Daily     memantine  5 mg Oral Daily     nicotine  1 patch Transdermal Daily     nicotine   Transdermal TID     OLANZapine  10 mg Intramuscular Once     OLANZapine  10 mg Oral TID     polyethylene glycol  17 g Oral Daily     salmeterol  1 puff Inhalation BID     venlafaxine  75 mg Oral Daily with breakfast     acetaminophen, albuterol, alum & mag hydroxide-simethicone, haloperidol, melatonin, naloxone, nicotine, OLANZapine, OLANZapine zydis, ondansetron **OR** ondansetron    Mental Status Examination:   Appearance:  awake, alert  Eye Contact:  limited  Speech:  difficult to understand, garbled  Language: minimal  Psychomotor Behavior:  no evidence of " tardive dyskinesia, dystonia, or tics  Mood:  unable to participate in interview  Affect:  restricted  Thought Process:  paucity  Thought Content:  no evidence of suicidal ideation or homicidal ideation and no observation of response to internal stimuli   Oriented to:  unable to participate in interview  Attention Span and Concentration:  poor  Recent and Remote Memory:  limited  Fund of Knowledge: delayed  Insight:  limited  Judgment:  limited       Labs/Vitals:   No results found for this or any previous visit (from the past 24 hour(s)).  B/P: 123/95, T: 97.6, P: 89, R: 18        DIagnoses:   1.  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   2.  Other schizophrenia spectrum disorder.   3.  Alcohol use disorder in remission.   4.  Stimulant use disorder, in remission.   5.  Chronic obstructive pulmonary disease.   6.  Hypertension.          Plan:   1. Discontinue olanzapine 10 mg tid  2. Begin Haldol 5 mg bid  3. Begin Cogentin 0.5 mg bid  4. Patient under commitment; SW in talks with his  in finding placement  5. Reconsult psych      Attestation:  Patient has been seen and evaluated by me,  Ubaldo Ho,     Visit/Communication Style   VIRTUAL (VIDEO) communication was used to evaluate John due to the COVID pandemic.    John consented to the use of video communication: yes  Video START time: 11:35 am, 9/20/2020  Video STOP time: 11:40 am, 9/20/2020   Patient's location: [facility]   Provider's location during the visit: Home
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
## 2020-09-21 PROCEDURE — 25000132 ZZH RX MED GY IP 250 OP 250 PS 637: Performed by: NURSE PRACTITIONER

## 2020-09-21 PROCEDURE — 12000000 ZZH R&B MED SURG/OB

## 2020-09-21 PROCEDURE — 99231 SBSQ HOSP IP/OBS SF/LOW 25: CPT | Performed by: STUDENT IN AN ORGANIZED HEALTH CARE EDUCATION/TRAINING PROGRAM

## 2020-09-21 PROCEDURE — 25000132 ZZH RX MED GY IP 250 OP 250 PS 637: Performed by: PSYCHIATRY & NEUROLOGY

## 2020-09-21 PROCEDURE — 25000132 ZZH RX MED GY IP 250 OP 250 PS 637: Performed by: INTERNAL MEDICINE

## 2020-09-21 PROCEDURE — 99233 SBSQ HOSP IP/OBS HIGH 50: CPT | Performed by: PSYCHIATRY & NEUROLOGY

## 2020-09-21 RX ADMIN — HALOPERIDOL 5 MG: 5 TABLET ORAL at 09:07

## 2020-09-21 RX ADMIN — POLYETHYLENE GLYCOL 3350 17 G: 17 POWDER, FOR SOLUTION ORAL at 09:07

## 2020-09-21 RX ADMIN — ASPIRIN 81 MG: 81 TABLET, COATED ORAL at 09:06

## 2020-09-21 RX ADMIN — VENLAFAXINE HYDROCHLORIDE 75 MG: 75 CAPSULE, EXTENDED RELEASE ORAL at 09:06

## 2020-09-21 RX ADMIN — SALMETEROL XINAFOATE 1 PUFF: 50 POWDER, METERED ORAL; RESPIRATORY (INHALATION) at 09:08

## 2020-09-21 RX ADMIN — DOCUSATE SODIUM 100 MG: 100 CAPSULE, LIQUID FILLED ORAL at 09:06

## 2020-09-21 RX ADMIN — DOCUSATE SODIUM 100 MG: 100 CAPSULE, LIQUID FILLED ORAL at 20:54

## 2020-09-21 RX ADMIN — BENZTROPINE MESYLATE 0.5 MG: 0.5 TABLET ORAL at 20:55

## 2020-09-21 RX ADMIN — NICOTINE POLACRILEX 2 MG: 2 GUM, CHEWING ORAL at 22:08

## 2020-09-21 RX ADMIN — HALOPERIDOL 7 MG: 5 TABLET ORAL at 20:54

## 2020-09-21 RX ADMIN — LEVOTHYROXINE SODIUM 88 MCG: 88 TABLET ORAL at 09:06

## 2020-09-21 RX ADMIN — BENZTROPINE MESYLATE 0.5 MG: 0.5 TABLET ORAL at 09:06

## 2020-09-21 RX ADMIN — ATORVASTATIN CALCIUM 80 MG: 40 TABLET, FILM COATED ORAL at 22:08

## 2020-09-21 RX ADMIN — MEMANTINE 5 MG: 5 TABLET ORAL at 09:06

## 2020-09-21 ASSESSMENT — ACTIVITIES OF DAILY LIVING (ADL)
ADLS_ACUITY_SCORE: 14

## 2020-09-21 NOTE — CONSULTS
Psychiatry consult:  Full note to follow    Increase haldol to 7mg BID this evening to achieve his prior effective dose.  He is under involuntary committment for treatment of mental illness while noting a prominent neurocognitive disorder.   Social work notes indicate a dispo plan: Referral to [facility] secured unit is pending as is referral to [facility].    Ze Batres MD   Text Page

## 2020-09-21 NOTE — PLAN OF CARE
DATE & TIME: 9/21/20 at nights  Cognitive Concerns/ Orientation : Alert to self. Calm this shift, but irritable in evenings per previous nurse   BEHAVIOR & AGGRESSION TOOL COLOR: Yellow d/t hx of aggression. Has had multiple Code 21's called over the weekend.   CIWA SCORE: N/A   ABNL VS/O2: VSS on room air- done daily.   MOBILITY: Ind in room.   PAIN MANAGMENT: Denies   DIET: Regular; excellent appetite, wants lots of cola and snacks. Does good with distraction. Behavioral tray.   BOWEL/BLADDER: Continent  ABNL LAB/BG: No new recent labs   DRAIN/DEVICES: N/A  TELEMETRY RHYTHM: N/A  SKIN: dry, intact   TESTS/PROCEDURES: N/A  D/C DAY/GOALS/PLACE: Pending placement. Reassessed by Psych on 9/20 - discontinued zyprexa and ordered haldol scheduled.   OTHER IMPORTANT INFO: Court hold/ on commitment, door alarm on; patients knows how to turn off alarm, need to watch him if it goes off, he will walk in hallway and shut door so alarm shuts off.

## 2020-09-21 NOTE — PLAN OF CARE
DATE & TIME: 9/20/20 2626-0796  Cognitive Concerns/ Orientation : Alert to self only. Restless, agitated in the beginning of the shift. PRN Haldol given since first time dose of Haldol wasn't given. Sleeping in the afternoon.  BEHAVIOR & AGGRESSION TOOL COLOR: Yellow d/t hx of aggression. Has had multiple Code 21's called over the weekend.   CIWA SCORE: N/A   ABNL VS/O2: VSS on room air- done daily.   MOBILITY: Ind in room.   PAIN MANAGMENT: Denies   DIET: Regular; excellent appetite, wants lots of cola and snacks. Does good with distraction. Behavioral tray.   BOWEL/BLADDER: Continent  ABNL LAB/BG: No new recent labs   DRAIN/DEVICES: N/A  TELEMETRY RHYTHM: N/A  SKIN: Refused skin check on this shift, refused most of pt assessment. Gets more agitated with staff in room.   TESTS/PROCEDURES: N/A  D/C DAY/GOALS/PLACE: Pending placement. Reassessed by Psych that d/c'ed zyprexa and ordered haldol scheduled.   OTHER IMPORTANT INFO: Court hold/ on commitment, door alarm on; patients knows how to turn off alarm, need to watch him if it goes off, he will walk in hallway and shut door so alarm shuts off.     COMMIT TO SIT DONE AND SIGNED OFF: Yes

## 2020-09-21 NOTE — CONSULTS
[facility]  Psychiatry Consultation - Follow-up note      Interim History:   Follow-up requested today to assess the patient's response to treatment.  The patient was seen by Dr. Ho yesterday who resumed Haldol noting a history of limited response to Zyprexa.    The patient is familiar to me from a prior hospitalization on our inpatient psychiatric unit.  I do recall that he did not gain benefit from treatment with Zyprexa.  He tells me that he is leaving today to return back home.  He is not sure where he is at the moment however does not anticipate being here until tomorrow.  He complained a bit about not receiving enough soda and not liking the smaller cans.  Auditory hallucinations are reported as being minimal.  He denied suicidal and homicidal thoughts.  He did not report any medication side effects.  He had no complaints regarding depressed or anxious mood today.           Medications:       aspirin  81 mg Oral Daily     atorvastatin  80 mg Oral At Bedtime     benztropine  0.5 mg Oral BID     docusate sodium  100 mg Oral BID     haloperidol  7 mg Oral BID     levothyroxine  88 mcg Oral Daily     memantine  5 mg Oral Daily     nicotine  1 patch Transdermal Daily     nicotine   Transdermal TID     polyethylene glycol  17 g Oral Daily     salmeterol  1 puff Inhalation BID     venlafaxine  75 mg Oral Daily with breakfast          Allergies:     Allergies   Allergen Reactions     Ibuprofen      Latex           Labs:   No results found for this or any previous visit (from the past 24 hour(s)).       Psychiatric Examination:     /89 (BP Location: Right arm)   Pulse 86   Temp 98.2  F (36.8  C) (Oral)   Resp 16   Ht 1.829 m (6')   Wt 70 kg (154 lb 5.2 oz)   SpO2 99%   BMI 20.93 kg/m    Weight is 154 lbs 5.15 oz  Body mass index is 20.93 kg/m .  Orthostatic Vitals     None            Appearance: dressed in hospital scrubs and slightly unkempt  Attitude:  guarded  Eye Contact:   fair  Mood:  Irritable  Affect:  mood congruent  Speech:  clear, coherent  Psychomotor Behavior:  no evidence of tardive dyskinesia, dystonia, or tics  Throught Process:  linear  Associations:  no loose associations  Thought Content:  no evidence of suicidal ideation or homicidal ideation and He appeared guarded and mild residual psychosis is suspected he did not appear to be overtly responding to psychotic stimuli  Insight:  limited  Judgement:  limited  Oriented to:  Person only  Attention Span and Concentration:  limited  Recent and Remote Memory:  limited           DIagnoses:     1.  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   2.  Other schizophrenia spectrum disorder.   3.  Alcohol use disorder, in remission.   4.  Stimulant use disorder, in remission.   5.  Chronic obstructive pulmonary disease.   6.  Hypertension.       Recommendations:     Increase haldol to 7mg BID this evening to achieve his prior effective dose.    He is under involuntary committment for treatment of mental illness while noting a prominent neurocognitive disorder.     Unit  will continue coordinating the patient's treatment status with his [facility] .    Dispo: pending referrals to [facility].    Please reconsult with Psychiatry as needed.   Ze Batres MD   Text Page

## 2020-09-21 NOTE — PLAN OF CARE
DATE & TIME: 9/21/20 7958-1862  Cognitive Concerns/ Orientation : Alert to self. Calm and cooperative  BEHAVIOR & AGGRESSION TOOL COLOR: Green  CIWA SCORE: N/A   ABNL VS/O2: VSS on room air- done once daily.   MOBILITY: Ind in room.   PAIN MANAGMENT: Denies   DIET: Regular; excellent appetite, wants lots of cola and snacks. Behavioral tray.   BOWEL/BLADDER: Continent, pt reported his last BM was a month ago, BS+, passing flatus, received scheduled Miralax and Colace.   ABNL LAB/BG: No new recent labs   DRAIN/DEVICES: N/A  TELEMETRY RHYTHM: N/A  SKIN: dry, intact   TESTS/PROCEDURES: N/A  D/C DAY/GOALS/PLACE: Pending placement.   OTHER IMPORTANT INFO: Court hold/ on commitment, door alarm on; patients knows how to turn off alarm, need to watch him if it goes off, he will walk in hallway and shut door so alarm shuts off. Haldol 5 mg BID, increased to 7 mg BID.
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
## 2020-09-22 PROCEDURE — 25000132 ZZH RX MED GY IP 250 OP 250 PS 637: Performed by: NURSE PRACTITIONER

## 2020-09-22 PROCEDURE — 25000132 ZZH RX MED GY IP 250 OP 250 PS 637: Performed by: INTERNAL MEDICINE

## 2020-09-22 PROCEDURE — 25000132 ZZH RX MED GY IP 250 OP 250 PS 637: Performed by: PSYCHIATRY & NEUROLOGY

## 2020-09-22 PROCEDURE — 99231 SBSQ HOSP IP/OBS SF/LOW 25: CPT | Performed by: STUDENT IN AN ORGANIZED HEALTH CARE EDUCATION/TRAINING PROGRAM

## 2020-09-22 PROCEDURE — 12000000 ZZH R&B MED SURG/OB

## 2020-09-22 RX ADMIN — NICOTINE 1 PATCH: 21 PATCH, EXTENDED RELEASE TRANSDERMAL at 09:35

## 2020-09-22 RX ADMIN — MEMANTINE 5 MG: 5 TABLET ORAL at 09:34

## 2020-09-22 RX ADMIN — VENLAFAXINE HYDROCHLORIDE 75 MG: 75 CAPSULE, EXTENDED RELEASE ORAL at 09:34

## 2020-09-22 RX ADMIN — BENZTROPINE MESYLATE 0.5 MG: 0.5 TABLET ORAL at 21:16

## 2020-09-22 RX ADMIN — BENZTROPINE MESYLATE 0.5 MG: 0.5 TABLET ORAL at 09:35

## 2020-09-22 RX ADMIN — ASPIRIN 81 MG: 81 TABLET, COATED ORAL at 09:34

## 2020-09-22 RX ADMIN — ATORVASTATIN CALCIUM 80 MG: 40 TABLET, FILM COATED ORAL at 21:16

## 2020-09-22 RX ADMIN — HALOPERIDOL 7 MG: 5 TABLET ORAL at 09:35

## 2020-09-22 RX ADMIN — DOCUSATE SODIUM 100 MG: 100 CAPSULE, LIQUID FILLED ORAL at 09:34

## 2020-09-22 RX ADMIN — POLYETHYLENE GLYCOL 3350 17 G: 17 POWDER, FOR SOLUTION ORAL at 09:35

## 2020-09-22 RX ADMIN — HALOPERIDOL 7 MG: 5 TABLET ORAL at 21:16

## 2020-09-22 RX ADMIN — SALMETEROL XINAFOATE 1 PUFF: 50 POWDER, METERED ORAL; RESPIRATORY (INHALATION) at 21:18

## 2020-09-22 RX ADMIN — DOCUSATE SODIUM 100 MG: 100 CAPSULE, LIQUID FILLED ORAL at 21:16

## 2020-09-22 RX ADMIN — SALMETEROL XINAFOATE 1 PUFF: 50 POWDER, METERED ORAL; RESPIRATORY (INHALATION) at 09:42

## 2020-09-22 RX ADMIN — LEVOTHYROXINE SODIUM 88 MCG: 88 TABLET ORAL at 09:34

## 2020-09-22 ASSESSMENT — ACTIVITIES OF DAILY LIVING (ADL)
ADLS_ACUITY_SCORE: 14

## 2020-09-22 NOTE — PLAN OF CARE
DATE & TIME: 9/21-22 7622-2608   Cognitive Concerns/ Orientation : A & O x 1 (self only); confused  BEHAVIOR & AGGRESSION TOOL COLOR: Green   CIWA SCORE: N/A   ABNL VS/O2: VSS on RA   MOBILITY: independent in the room  PAIN MANAGMENT: Denies   DIET: Regular; tolerating well   BOWEL/BLADDER: BS active x 4; continent   ABNL LAB/BG: WDL   DRAIN/DEVICES: N/A  TELEMETRY RHYTHM: N/A  SKIN: Scattered bruising; intact   TESTS/PROCEDURES: N/A  D/C DAY/GOALS/PLACE: pending placement   OTHER IMPORTANT INFO: pt. On court hold. Pt. Frequently setting off door alarm and trying to walk in the halls; easily re-direct able this evening; nicotine gum and patches offered frequently; Nursing will continue to monitor and assess.   MD/RN ROUNDING SIGNED OFF D/E SHIFT: N/A  COMMIT TO SIT DONE AND SIGNED OFF: COMPLETE

## 2020-09-22 NOTE — PROGRESS NOTES
[facility]    Hospitalist Progress Note      Assessment & Plan   John Juárez is a 54 year old male who was admitted on 9/9/2020 after aggressive behavior at group home, and left, and now they will not take him back. Pt is medically stable for discharge. Awaiting new placement. Appreciate social work assistance.      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/alcohol use disorder with behavioral disturbance   Chronic schizophrenia  - has been having code 21 daily for last 3 days and needing IM Zyprexa for Code  - pscy re consulted again today, changed Zyprexa to Haldol 5mg BID, and added Cogentin 0.5mg BID   - Continue other chronic psych meds - Benztropine, memantine,venlafaxine  - Appreciate social work assistance with new placement   - patient is currently under civil commitment through [facility] until 7/31/21  - appreciate psych help, reconsulted for tomorrow     HLD  -Continue ASA and atorvastatin      COPD (chronic obstructive pulmonary disease)   -Continue salmeterol      Smoker - no interest in quitting    - nicotine patch     Hypothyroidism  - Continue Levothyroxine      DVT Prophylaxis: Pneumatic Compression Devices  Code Status: Full Code    Disposition: Expected discharge when new placement available.    Florencio Nicolas MD  Text Page  (7am to 6pm)    Interval History    Uneventful prior 24hrs per chart.  Asks for more soda. States he plans to leave but unable to state where he will go or how. Denies other complaints.     -Data reviewed today: I reviewed all new labs and imaging results over the last 24 hours. I personally reviewed no images or EKG's today.    Physical Exam   Temp: 98.2  F (36.8  C) Temp src: Oral BP: 113/83 Pulse: (P) 95   Resp: 18 SpO2: 98 % O2 Device: None (Room air)    Vitals:    09/09/20 1754   Weight: 70 kg (154 lb 5.2 oz)     Vital Signs with Ranges  Temp:  [97.7  F (36.5  C)-98.2  F (36.8  C)] 98.2  F (36.8  C)  Pulse:  [95] (P) 95  Resp:  [16-18]  18  BP: (113)/(83) 113/83  SpO2:  [98 %] 98 %  I/O last 3 completed shifts:  In: 840 [P.O.:840]  Out: -     Constitutional: Alert,awake, no apparent distress  Respiratory: Clear to auscultation bilaterally, no wheezing  Cardiovascular: regular rate and rhythm  GI: soft and non-tender      Medications       aspirin  81 mg Oral Daily     atorvastatin  80 mg Oral At Bedtime     benztropine  0.5 mg Oral BID     docusate sodium  100 mg Oral BID     haloperidol  7 mg Oral BID     levothyroxine  88 mcg Oral Daily     memantine  5 mg Oral Daily     nicotine  1 patch Transdermal Daily     nicotine   Transdermal TID     polyethylene glycol  17 g Oral Daily     salmeterol  1 puff Inhalation BID     venlafaxine  75 mg Oral Daily with breakfast       Data   No lab results found in last 7 days.    No results found for this or any previous visit (from the past 24 hour(s)).

## 2020-09-22 NOTE — PLAN OF CARE
DATE & TIME: 9/21/2020; 1269-4139    Cognitive Concerns/ Orientation : A & O x 1 (self only); confused  BEHAVIOR & AGGRESSION TOOL COLOR: Green   CIWA SCORE: N/A   ABNL VS/O2: VSS on RA   MOBILITY: independent in the room  PAIN MANAGMENT: Denies   DIET: Regular; tolerating well   BOWEL/BLADDER: BS active x 4; continent   ABNL LAB/BG: WDL   DRAIN/DEVICES: N/A  TELEMETRY RHYTHM: N/A  SKIN: Scattered bruising; intact   TESTS/PROCEDURES: N/A  D/C DAY/GOALS/PLACE: pending placement   OTHER IMPORTANT INFO: pt. On court hold, door alarm. Pt. Frequently setting off door alarm and trying to walk in the halls; easily re-direct able this evening; nicotine gum and patches offered frequently; Nursing will continue to monitor and assess.     MD/RN ROUNDING SIGNED OFF D/E SHIFT: N/A  COMMIT TO SIT DONE AND SIGNED OFF: COMPLETE

## 2020-09-23 PROCEDURE — 25000132 ZZH RX MED GY IP 250 OP 250 PS 637: Performed by: NURSE PRACTITIONER

## 2020-09-23 PROCEDURE — 25000132 ZZH RX MED GY IP 250 OP 250 PS 637: Performed by: PSYCHIATRY & NEUROLOGY

## 2020-09-23 PROCEDURE — 12000000 ZZH R&B MED SURG/OB

## 2020-09-23 PROCEDURE — 99231 SBSQ HOSP IP/OBS SF/LOW 25: CPT | Performed by: STUDENT IN AN ORGANIZED HEALTH CARE EDUCATION/TRAINING PROGRAM

## 2020-09-23 PROCEDURE — 25000132 ZZH RX MED GY IP 250 OP 250 PS 637: Performed by: INTERNAL MEDICINE

## 2020-09-23 PROCEDURE — 99232 SBSQ HOSP IP/OBS MODERATE 35: CPT | Performed by: PSYCHIATRY & NEUROLOGY

## 2020-09-23 RX ADMIN — ATORVASTATIN CALCIUM 80 MG: 40 TABLET, FILM COATED ORAL at 21:32

## 2020-09-23 RX ADMIN — HALOPERIDOL 7 MG: 5 TABLET ORAL at 21:32

## 2020-09-23 RX ADMIN — NICOTINE 1 PATCH: 21 PATCH, EXTENDED RELEASE TRANSDERMAL at 08:41

## 2020-09-23 RX ADMIN — SALMETEROL XINAFOATE 1 PUFF: 50 POWDER, METERED ORAL; RESPIRATORY (INHALATION) at 08:37

## 2020-09-23 RX ADMIN — HALOPERIDOL 7 MG: 5 TABLET ORAL at 08:39

## 2020-09-23 RX ADMIN — LEVOTHYROXINE SODIUM 88 MCG: 88 TABLET ORAL at 08:39

## 2020-09-23 RX ADMIN — DOCUSATE SODIUM 100 MG: 100 CAPSULE, LIQUID FILLED ORAL at 08:39

## 2020-09-23 RX ADMIN — ASPIRIN 81 MG: 81 TABLET, COATED ORAL at 08:39

## 2020-09-23 RX ADMIN — BENZTROPINE MESYLATE 0.5 MG: 0.5 TABLET ORAL at 08:38

## 2020-09-23 RX ADMIN — SALMETEROL XINAFOATE 1 PUFF: 50 POWDER, METERED ORAL; RESPIRATORY (INHALATION) at 21:31

## 2020-09-23 RX ADMIN — VENLAFAXINE HYDROCHLORIDE 75 MG: 75 CAPSULE, EXTENDED RELEASE ORAL at 08:38

## 2020-09-23 RX ADMIN — DOCUSATE SODIUM 100 MG: 100 CAPSULE, LIQUID FILLED ORAL at 21:32

## 2020-09-23 RX ADMIN — BENZTROPINE MESYLATE 0.5 MG: 0.5 TABLET ORAL at 21:32

## 2020-09-23 RX ADMIN — POLYETHYLENE GLYCOL 3350 17 G: 17 POWDER, FOR SOLUTION ORAL at 08:38

## 2020-09-23 RX ADMIN — MEMANTINE 5 MG: 5 TABLET ORAL at 08:38

## 2020-09-23 ASSESSMENT — ACTIVITIES OF DAILY LIVING (ADL)
ADLS_ACUITY_SCORE: 14

## 2020-09-23 NOTE — PROGRESS NOTES
[facility]    Hospitalist Progress Note      Assessment & Plan   John Juárez is a 54 year old male who was admitted on 9/9/2020 after aggressive behavior at group home, and left, and now they will not take him back. Pt is medically stable for discharge. Awaiting new placement. Appreciate social work assistance.      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/alcohol use disorder with behavioral disturbance   Chronic schizophrenia  - has been having code 21 daily for last 3 days and needing IM Zyprexa for Code  - pscy re consulted again today, changed Zyprexa to Haldol 5mg BID, and added Cogentin 0.5mg BID   - Continue other chronic psych meds - Benztropine, memantine,venlafaxine  - Appreciate social work assistance with new placement   - patient is currently under civil commitment through [facility] until 7/31/21  - appreciate psych help, reconsulted for tomorrow     HLD  -Continue ASA and atorvastatin      COPD (chronic obstructive pulmonary disease)   -Continue salmeterol      Smoker - no interest in quitting    - nicotine patch     Hypothyroidism  - Continue Levothyroxine      DVT Prophylaxis: Pneumatic Compression Devices  Code Status: Full Code    Disposition: Expected discharge when new placement available.    Florencio Nicolas MD  Text Page  (7am to 6pm)    Interval History    Uneventful prior 24hrs per chart.  States he doesn't understand why he is still in the hospital and wants to talk to his .     -Data reviewed today: I reviewed all new labs and imaging results over the last 24 hours. I personally reviewed no images or EKG's today.    Physical Exam   Temp: 97.5  F (36.4  C) Temp src: Oral BP: 117/87 Pulse: 90   Resp: 18 SpO2: 100 % O2 Device: None (Room air)    Vitals:    09/09/20 1754   Weight: 70 kg (154 lb 5.2 oz)     Vital Signs with Ranges  Temp:  [97.5  F (36.4  C)-98.2  F (36.8  C)] 97.5  F (36.4  C)  Pulse:  [90] 90  Resp:  [18] 18  BP: (113-117)/(83-87)  117/87  SpO2:  [98 %-100 %] 100 %  I/O last 3 completed shifts:  In: 860 [P.O.:860]  Out: -     Constitutional: Alert,awake, no apparent distress  Respiratory: Clear to auscultation bilaterally, no wheezing  Cardiovascular: regular rate and rhythm  GI: soft and non-tender      Medications       aspirin  81 mg Oral Daily     atorvastatin  80 mg Oral At Bedtime     benztropine  0.5 mg Oral BID     docusate sodium  100 mg Oral BID     haloperidol  7 mg Oral BID     levothyroxine  88 mcg Oral Daily     memantine  5 mg Oral Daily     nicotine  1 patch Transdermal Daily     nicotine   Transdermal TID     polyethylene glycol  17 g Oral Daily     salmeterol  1 puff Inhalation BID     venlafaxine  75 mg Oral Daily with breakfast       Data   No lab results found in last 7 days.    No results found for this or any previous visit (from the past 24 hour(s)).

## 2020-09-23 NOTE — CONSULTS
"[facility] Psychiatric Consult Progress Note    Interval History:   I [place] the [place] [address].  He has a door alarm, waiting for placement.  He has been taking scheduled Haldol along with some Cogentin and a low-dose of Namenda as well as venlafaxine.  He says little, [place] and then told me \"I am at [place]\".  He has a bit of a blank look on his face but is not causing any behavior issues.  I understand that he is under commitment and currently on a court hold.  I did meet with staff and they indicate that he has been doing okay on the current dose of haloperidol.  He has Zyprexa available if needed.     Review of systems:   10 point Review of Systems completed by Sushil Wilks MD , and is  is negative other than noted in the HPI     Medications:       aspirin  81 mg Oral Daily     atorvastatin  80 mg Oral At Bedtime     benztropine  0.5 mg Oral BID     docusate sodium  100 mg Oral BID     haloperidol  7 mg Oral BID     levothyroxine  88 mcg Oral Daily     memantine  5 mg Oral Daily     nicotine  1 patch Transdermal Daily     nicotine   Transdermal TID     polyethylene glycol  17 g Oral Daily     salmeterol  1 puff Inhalation BID     venlafaxine  75 mg Oral Daily with breakfast     acetaminophen, albuterol, alum & mag hydroxide-simethicone, haloperidol, melatonin, naloxone, nicotine, OLANZapine, OLANZapine zydis, ondansetron **OR** ondansetron    Mental Status Examination:   Appearance:  awake, alert  Eye Contact: Intense, he tends to stare blankly at times  Speech:  Impoverished, monotone   language: minimal  Psychomotor Behavior:  no evidence of tardive dyskinesia, dystonia, or tics  Mood: \"Fine\"   affect: Blunted   thought Process:  paucity of speech, he appears distracted at times  Thought Content:  no evidence of suicidal ideation or homicidal ideation, but at times he does tend to stare off into space, looks a little bit distracted and " guarded.    Oriented to: Person only, he thought he was at the [facility] or on an [facility]  Attention Span and Concentration:  poor  Recent and Remote Memory:  limited  Fund of Knowledge: delayed  Insight:  limited  Judgment:  limited       Labs/Vitals:   No results found for this or any previous visit (from the past 24 hour(s)).  B/P: 123/95, T: 97.6, P: 89, R: 18        DIagnoses:   1.  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   2.  Other schizophrenia spectrum disorder.   3.  Alcohol use disorder in remission.   4.  Stimulant use disorder, in remission.   5.  Chronic obstructive pulmonary disease.   6.  Hypertension.   From a psychiatric standpoint John appears stable but is prone to poor impulse control due to his severe cognitive impairment.  We will continue current medications and work on placement as he needs to be in a locked setting long-term       Plan:   1.  He has as needed doses of Zyprexa available for breakthrough agitation  2.  Continue Haldol 7 mg twice daily   3.  Continue Cogentin 0.5 mg bid  4. Patient under commitment; SW in talks with his  in finding placement  5. Reconsult psych as needed, I would suggest continuing the door alarm due to his poor impulse control and elopement risk      Attestation:  Patient has been seen and evaluated by me,  Sushil Wilks MD    Visit/Communication Style   In person, face-to-face

## 2020-09-23 NOTE — PLAN OF CARE
DATE & TIME: 9/23/20 0125-6530  Cognitive Concerns/ Orientation : Alert to self  BEHAVIOR & AGGRESSION TOOL COLOR: Green   CIWA SCORE: N/A       ABNL VS/O2: VSS on RA   MOBILITY: Independent in the room  PAIN MANAGMENT: Denies   DIET: Regular; tolerating well   BOWEL/BLADDER: Up to the BR  ABNL LAB/BG: None today  DRAIN/DEVICES: N/A  TELEMETRY RHYTHM: N/A  SKIN: Refused full skin assessment  TESTS/PROCEDURES: N/A  D/C DAY/GOALS/PLACE: Pending placement   OTHER IMPORTANT INFO: On court hold. Needs help with ordering food, likes Diet Cola. Opens door at times, can be redirected. - Door alarm on. Scheduled Haldol. Nicotine patch on R shoulder. Psych following.

## 2020-09-23 NOTE — PLAN OF CARE
DATE & TIME: 9/22-9/23 6680-0680  Cognitive Concerns/ Orientation : A & O to self, calm and cooperative, obeys commands  BEHAVIOR & AGGRESSION TOOL COLOR: Green   CIWA SCORE: N/A   ABNL VS/O2: VSS on RA   MOBILITY: independent in the room  PAIN MANAGMENT: Denies   DIET: Regular; tolerating well   BOWEL/BLADDER: continent , ambulates to the BR  ABNL LAB/BG: WDL   DRAIN/DEVICES: N/A  TELEMETRY RHYTHM: N/A  SKIN: Scattered bruising; intact - refused full skin assessment  TESTS/PROCEDURES: N/A  D/C DAY/GOALS/PLACE: pending placement   OTHER IMPORTANT INFO: pt. On court hold, needs help in ordering food,continue to monitor and assess.   Tries to get out of the room at times, can be redirected. - Door alarm on. Scheduled Haldol. Nicotine patch on L shoulder

## 2020-09-24 PROCEDURE — 25000132 ZZH RX MED GY IP 250 OP 250 PS 637: Performed by: PSYCHIATRY & NEUROLOGY

## 2020-09-24 PROCEDURE — 99231 SBSQ HOSP IP/OBS SF/LOW 25: CPT | Performed by: STUDENT IN AN ORGANIZED HEALTH CARE EDUCATION/TRAINING PROGRAM

## 2020-09-24 PROCEDURE — 25000132 ZZH RX MED GY IP 250 OP 250 PS 637: Performed by: NURSE PRACTITIONER

## 2020-09-24 PROCEDURE — 12000000 ZZH R&B MED SURG/OB

## 2020-09-24 PROCEDURE — 25000132 ZZH RX MED GY IP 250 OP 250 PS 637: Performed by: INTERNAL MEDICINE

## 2020-09-24 RX ADMIN — NICOTINE 1 PATCH: 21 PATCH, EXTENDED RELEASE TRANSDERMAL at 09:08

## 2020-09-24 RX ADMIN — BENZTROPINE MESYLATE 0.5 MG: 0.5 TABLET ORAL at 20:54

## 2020-09-24 RX ADMIN — DOCUSATE SODIUM 100 MG: 100 CAPSULE, LIQUID FILLED ORAL at 09:07

## 2020-09-24 RX ADMIN — SALMETEROL XINAFOATE 1 PUFF: 50 POWDER, METERED ORAL; RESPIRATORY (INHALATION) at 09:10

## 2020-09-24 RX ADMIN — MEMANTINE 5 MG: 5 TABLET ORAL at 09:07

## 2020-09-24 RX ADMIN — NICOTINE POLACRILEX 2 MG: 2 GUM, CHEWING ORAL at 22:27

## 2020-09-24 RX ADMIN — ASPIRIN 81 MG: 81 TABLET, COATED ORAL at 09:07

## 2020-09-24 RX ADMIN — POLYETHYLENE GLYCOL 3350 17 G: 17 POWDER, FOR SOLUTION ORAL at 09:08

## 2020-09-24 RX ADMIN — NICOTINE POLACRILEX 2 MG: 2 GUM, CHEWING ORAL at 20:59

## 2020-09-24 RX ADMIN — DOCUSATE SODIUM 100 MG: 100 CAPSULE, LIQUID FILLED ORAL at 20:54

## 2020-09-24 RX ADMIN — SALMETEROL XINAFOATE 1 PUFF: 50 POWDER, METERED ORAL; RESPIRATORY (INHALATION) at 20:57

## 2020-09-24 RX ADMIN — LEVOTHYROXINE SODIUM 88 MCG: 88 TABLET ORAL at 09:07

## 2020-09-24 RX ADMIN — HALOPERIDOL 7 MG: 5 TABLET ORAL at 09:07

## 2020-09-24 RX ADMIN — VENLAFAXINE HYDROCHLORIDE 75 MG: 75 CAPSULE, EXTENDED RELEASE ORAL at 09:08

## 2020-09-24 RX ADMIN — BENZTROPINE MESYLATE 0.5 MG: 0.5 TABLET ORAL at 09:07

## 2020-09-24 RX ADMIN — ATORVASTATIN CALCIUM 80 MG: 40 TABLET, FILM COATED ORAL at 20:55

## 2020-09-24 RX ADMIN — HALOPERIDOL 7 MG: 5 TABLET ORAL at 20:54

## 2020-09-24 ASSESSMENT — ACTIVITIES OF DAILY LIVING (ADL)
ADLS_ACUITY_SCORE: 14

## 2020-09-24 NOTE — PLAN OF CARE
DATE & TIME: 9/23/20 2300  Cognitive Concerns/ Orientation : Alert to self, calm and cooperative this shift.   BEHAVIOR & AGGRESSION TOOL COLOR: Green   CIWA SCORE: N/A       ABNL VS/O2: VSS on RA, daily.    MOBILITY: Independent in the room  PAIN MANAGMENT: Denies   DIET: Regular; tolerating well   BOWEL/BLADDER: Up to the BR  ABNL LAB/BG: None today  DRAIN/DEVICES: N/A  TELEMETRY RHYTHM: N/A  SKIN: Refused full skin assessment  TESTS/PROCEDURES: N/A  D/C DAY/GOALS/PLACE: Pending placement   OTHER IMPORTANT INFO: On court hold. Needs help with ordering food, likes Diet Cola. Opens door at times, can be redirected. Door alarm on. Scheduled Haldol. Pt removed nicotine patch last shift

## 2020-09-24 NOTE — PROGRESS NOTES
[facility]    Hospitalist Progress Note      Assessment & Plan   John Juárez is a 54 year old male who was admitted on 9/9/2020 after aggressive behavior at group home, and left, and now they will not take him back. Pt is medically stable for discharge. Awaiting new placement. Appreciate social work assistance.      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/alcohol use disorder with behavioral disturbance   Chronic schizophrenia  - has been having code 21 daily for last 3 days and needing IM Zyprexa for Code  - pscy re consulted again today, changed Zyprexa to Haldol 5mg BID, and added Cogentin 0.5mg BID   - Continue other chronic psych meds - Benztropine, memantine,venlafaxine  - Appreciate social work assistance with new placement   - patient is currently under civil commitment through [facility] until 7/31/21  - appreciate psych help, reconsulted for tomorrow     HLD  -Continue ASA and atorvastatin      COPD (chronic obstructive pulmonary disease)   -Continue salmeterol      Smoker - no interest in quitting    - nicotine patch     Hypothyroidism  - Continue Levothyroxine      DVT Prophylaxis: Pneumatic Compression Devices  Code Status: Full Code    Disposition: Expected discharge when new placement available.    Florencio Nicolas MD  Text Page  (7am to 6pm)    Interval History    Uneventful prior 24hrs per chart.  States he is watching TV and doesn't want to talk. Offers no complaint.    -Data reviewed today: I reviewed all new labs and imaging results over the last 24 hours. I personally reviewed no images or EKG's today.    Physical Exam   Temp: 98  F (36.7  C) Temp src: Oral BP: 106/83 Pulse: 82   Resp: 18 SpO2: 97 % O2 Device: None (Room air)    Vitals:    09/09/20 1754   Weight: 70 kg (154 lb 5.2 oz)     Vital Signs with Ranges  Temp:  [97.7  F (36.5  C)-98  F (36.7  C)] 98  F (36.7  C)  Pulse:  [80-82] 82  Resp:  [18] 18  BP: (103-107)/(77-84) 106/83  SpO2:  [97 %-98 %] 97  %  No intake/output data recorded.    Constitutional: Alert,awake, no apparent distress  Respiratory: Clear to auscultation bilaterally, no wheezing  Cardiovascular: regular rate and rhythm  GI: soft and non-tender      Medications       aspirin  81 mg Oral Daily     atorvastatin  80 mg Oral At Bedtime     benztropine  0.5 mg Oral BID     docusate sodium  100 mg Oral BID     haloperidol  7 mg Oral BID     levothyroxine  88 mcg Oral Daily     memantine  5 mg Oral Daily     nicotine  1 patch Transdermal Daily     nicotine   Transdermal TID     polyethylene glycol  17 g Oral Daily     salmeterol  1 puff Inhalation BID     venlafaxine  75 mg Oral Daily with breakfast       Data   No lab results found in last 7 days.    No results found for this or any previous visit (from the past 24 hour(s)).

## 2020-09-24 NOTE — PLAN OF CARE
DATE & TIME: 9/24/20 1430  Cognitive Concerns/ Orientation : Alert to self, calm and cooperative this shift.   BEHAVIOR & AGGRESSION TOOL COLOR: Green   CIWA SCORE: N/A       ABNL VS/O2: VSS on RA, daily.    MOBILITY: Independent in the room  PAIN MANAGMENT: Denies   DIET: Regular; tolerating well   BOWEL/BLADDER: Up to the BR  ABNL LAB/BG: None today  DRAIN/DEVICES: N/A  TELEMETRY RHYTHM: N/A  SKIN: Refused full skin assessment  TESTS/PROCEDURES: N/A  D/C DAY/GOALS/PLACE: Pending placement   OTHER IMPORTANT INFO: On court hold. Needs help with ordering food, likes Diet Cola. Scheduled Haldol.

## 2020-09-24 NOTE — PLAN OF CARE
DATE & TIME: 9/23/20 4459-4509  Cognitive Concerns/ Orientation : Alert to self, calm and cooperative this shift.   BEHAVIOR & AGGRESSION TOOL COLOR: Green   CIWA SCORE: N/A       ABNL VS/O2: VSS on RA, daily.    MOBILITY: Independent in the room  PAIN MANAGMENT: Denies   DIET: Regular; tolerating well   BOWEL/BLADDER: Up to the BR  ABNL LAB/BG: None today  DRAIN/DEVICES: N/A  TELEMETRY RHYTHM: N/A  SKIN: Refused full skin assessment  TESTS/PROCEDURES: N/A  D/C DAY/GOALS/PLACE: Pending placement   OTHER IMPORTANT INFO: On court hold. Needs help with ordering food, likes Diet Cola. Opens door at times, can be redirected. Door alarm on. Scheduled Haldol. Pt removed nicotine patch.

## 2020-09-25 PROCEDURE — 99231 SBSQ HOSP IP/OBS SF/LOW 25: CPT | Performed by: STUDENT IN AN ORGANIZED HEALTH CARE EDUCATION/TRAINING PROGRAM

## 2020-09-25 PROCEDURE — 25000132 ZZH RX MED GY IP 250 OP 250 PS 637: Performed by: INTERNAL MEDICINE

## 2020-09-25 PROCEDURE — 12000000 ZZH R&B MED SURG/OB

## 2020-09-25 PROCEDURE — 25000132 ZZH RX MED GY IP 250 OP 250 PS 637: Performed by: PSYCHIATRY & NEUROLOGY

## 2020-09-25 PROCEDURE — 99207 ZZC NON-BILLABLE SERV PER CHARTING: CPT | Performed by: NURSE PRACTITIONER

## 2020-09-25 PROCEDURE — 99233 SBSQ HOSP IP/OBS HIGH 50: CPT | Mod: GT | Performed by: PSYCHIATRY & NEUROLOGY

## 2020-09-25 RX ADMIN — LEVOTHYROXINE SODIUM 88 MCG: 88 TABLET ORAL at 08:26

## 2020-09-25 RX ADMIN — HALOPERIDOL 7 MG: 5 TABLET ORAL at 21:19

## 2020-09-25 RX ADMIN — DOCUSATE SODIUM 100 MG: 100 CAPSULE, LIQUID FILLED ORAL at 08:26

## 2020-09-25 RX ADMIN — BENZTROPINE MESYLATE 0.5 MG: 0.5 TABLET ORAL at 21:19

## 2020-09-25 RX ADMIN — ASPIRIN 81 MG: 81 TABLET, COATED ORAL at 08:26

## 2020-09-25 RX ADMIN — POLYETHYLENE GLYCOL 3350 17 G: 17 POWDER, FOR SOLUTION ORAL at 08:26

## 2020-09-25 RX ADMIN — ATORVASTATIN CALCIUM 80 MG: 40 TABLET, FILM COATED ORAL at 21:18

## 2020-09-25 RX ADMIN — MEMANTINE 5 MG: 5 TABLET ORAL at 08:26

## 2020-09-25 RX ADMIN — BENZTROPINE MESYLATE 0.5 MG: 0.5 TABLET ORAL at 08:26

## 2020-09-25 RX ADMIN — DOCUSATE SODIUM 100 MG: 100 CAPSULE, LIQUID FILLED ORAL at 21:18

## 2020-09-25 RX ADMIN — VENLAFAXINE HYDROCHLORIDE 75 MG: 75 CAPSULE, EXTENDED RELEASE ORAL at 08:26

## 2020-09-25 RX ADMIN — HALOPERIDOL 7 MG: 5 TABLET ORAL at 08:26

## 2020-09-25 RX ADMIN — SALMETEROL XINAFOATE 1 PUFF: 50 POWDER, METERED ORAL; RESPIRATORY (INHALATION) at 08:27

## 2020-09-25 RX ADMIN — SALMETEROL XINAFOATE 1 PUFF: 50 POWDER, METERED ORAL; RESPIRATORY (INHALATION) at 21:34

## 2020-09-25 ASSESSMENT — ACTIVITIES OF DAILY LIVING (ADL)
ADLS_ACUITY_SCORE: 14

## 2020-09-25 ASSESSMENT — MIFFLIN-ST. JEOR: SCORE: 1577.88

## 2020-09-25 NOTE — PROGRESS NOTES
[facility]    Hospitalist Progress Note      Assessment & Plan   John Juárez is a 54 year old male who was admitted on 9/9/2020 after aggressive behavior at group home, and left, and now they will not take him back. Pt is medically stable for discharge. Awaiting new placement. Appreciate social work assistance.      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  - psych re consulted daily , changed Zyprexa to Haldol 5mg BID, and added Cogentin 0.5mg BID   - Continue other chronic psych meds - Benztropine, memantine,venlafaxine  - Appreciate social work assistance with new placement   - patient is currently under civil commitment through [facility] until 7/31/21  - appreciate psych help, reconsulted for tomorrow     HLD  -Continue ASA and atorvastatin      COPD (chronic obstructive pulmonary disease)   -Continue salmeterol      Smoker - no interest in quitting    - nicotine patch     Hypothyroidism  - Continue Levothyroxine      DVT Prophylaxis: Pneumatic Compression Devices  Code Status: Full Code    Disposition: Expected discharge when new placement available.    Florencio Nicolas MD  Text Page  (7am to 6pm)    Interval History    Code 21 in AM for wandering to the stairwell. States he just wanted to go for a walk on exam. He is again watching TV on exam and asks for more soda. Has no other concerns or symptoms     -Data reviewed today: I reviewed all new labs and imaging results over the last 24 hours. I personally reviewed no images or EKG's today.    Physical Exam   Temp: 97.5  F (36.4  C) Temp src: Oral BP: (!) 131/99 Pulse: 95   Resp: 18 SpO2: 99 % O2 Device: None (Room air)    Vitals:    09/09/20 1754 09/25/20 0632   Weight: 70 kg (154 lb 5.2 oz) 71 kg (156 lb 8 oz)     Vital Signs with Ranges  Temp:  [97.3  F (36.3  C)-97.5  F (36.4  C)] 97.5  F (36.4  C)  Pulse:  [79-95] 95  Resp:   [18] 18  BP: (118-131)/(85-99) 131/99  SpO2:  [99 %] 99 %  I/O last 3 completed shifts:  In: 800 [P.O.:800]  Out: -     Constitutional: Alert,awake, no apparent distress  Respiratory: Clear to auscultation bilaterally, no wheezing  Cardiovascular: regular rate and rhythm  GI: soft and non-tender      Medications       aspirin  81 mg Oral Daily     atorvastatin  80 mg Oral At Bedtime     benztropine  0.5 mg Oral BID     docusate sodium  100 mg Oral BID     haloperidol  7 mg Oral BID     levothyroxine  88 mcg Oral Daily     memantine  5 mg Oral Daily     nicotine  1 patch Transdermal Daily     nicotine   Transdermal TID     polyethylene glycol  17 g Oral Daily     salmeterol  1 puff Inhalation BID     venlafaxine  75 mg Oral Daily with breakfast       Data   No lab results found in last 7 days.    No results found for this or any previous visit (from the past 24 hour(s)).

## 2020-09-25 NOTE — PLAN OF CARE
DATE & TIME: 1900-2300 9/24/2020    Cognitive Concerns/ Orientation : A/Ox1; dementia with behavioral disturbances    BEHAVIOR & AGGRESSION TOOL COLOR: repeated requests for cigs, finally agreed to gum, has nicotine patch on left deltoid   CIWA SCORE: NA    ABNL VS/O2: VSS. RA  MOBILITY: IND in room; door alarm active   PAIN MANAGMENT: denies   DIET: regular; intakes diet cola a lot. Had a sandwich this evening   BOWEL/BLADDER: continent   ABNL LAB/BG:   DRAIN/DEVICES: no IV access   TELEMETRY RHYTHM: NA  SKIN: intact  TESTS/PROCEDURES: NA  D/C DAY/GOALS/PLACE: Discharge pending placement   OTHER IMPORTANT INFO:   MD/RN ROUNDING SIGNED OFF D/E SHIFT: NA   COMMIT TO SIT DONE AND SIGNED OFF YES

## 2020-09-25 NOTE — PROGRESS NOTES
"BRIEF NUTRITION REASSESSMENT    CURRENT DIET AND INTAKE:  Diet: Regular (safe tray)     % intakes recorded for adequate meals TID. MD documents \"Strong appetite\".     LABS/MEDS/PHYSICAL FINDINGS:  Reviewed     No BM documented in flowsheets this admission, gets up to use restroom.   Bowel program is ordered     MALNUTRITION:  Visual Nutrition Focused Physical Assessment (NFPA) not completed due to restrictions on face-to-face patient care during COVID-19 Pandemic. Do not suspect muscle/fat losses.  Patient does not meet two of the criteria necessary for diagnosing malnutrition.     NUTRITION INTERVENTION:  Nutrition Diagnosis:  No nutrition diagnosis at this time.    Implementation:  Nutrition Education: Per Provider order if indicated, not appropriate at this time due to patient condition    FOLLOW UP/MONITORING:   Will re-evaluate in 7 - 10 days, or sooner, if re-consulted.    Fely Hernandes, RD, LD            "

## 2020-09-25 NOTE — PROGRESS NOTES
"D: DAVINA following for discharge planning.   I: DAVINA spoke with pt's county case darryler through [facility], [phone]. She has contacted the [facility] crisis housing program for possible placement. DAVINA faxed referral info to [phone]. Asked Gina to follow up on this referral. Also, Gina inquired with [facility]. DAVINA faxed a referral to [phone] and asked Gina to follow up. Referral also faxed to [facility], [phone], per Gina's request. DAVINA faxed referrals to [facility] and [facility]. Pt denied by [facility] but asked liaison if any other facilities in their network would be appropriate. Per Gina, pt is on a brain injury waiver for group home/AL payment. Per the order of pt's continued \"commitment as a person who is mentally ill to the head of [facility] and the Commissioner of Human Services is continued in effect until July 31, 2021,\" He should be held here.    P: DAVINA following for discharge planning.     JORDY Drummond, [facility]  [phone]  [facility]    Addendum: [facility] denied pt due to no appropriate bed.    "

## 2020-09-25 NOTE — CODE/RAPID RESPONSE
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 Juárez went down the [address] stairs this morning for unclear reasons. Upon my arrival to 66 he was returning to his room escorted willingly by  and [address] staff. Per EHR he is on civil commitment with [facility]. Mr. Juárez was very calm and cooperative. I did not directly evaluate Mr. Juárez.     MAIA Guadalupe, CNP  Hospitalist Service, House Officer  [facility]     Text Page  Pager: [phone]

## 2020-09-25 NOTE — CONSULTS
"[facility] Psychiatric Consult Progress Note    Interval History:   Pt seen, chart reviewed, case discussed with nursing staff and treating clinicians. Was doing a bit better upon transition from Zyprexa to Haldol and was titrated to a dosing of 7 mg bid, which was the dose demonstrating good effect upon discharge from recent psychiatric hospitalization. Maintains on Cogentin as well. Code 21 was called this AM as patient went down [address] stairs for \"unclear reasons\" per charting. Security escorted patient back to his room, which he did willingly. Was charted to be \"very calm and cooperative.\" Prior charting of behaviors during shift per nursing indicated patient has been calm, cooperative, and redirectable. Strong appetite. No charting of sleep difficulties.    On interview, patient wonders why he's still in the hospital. I explained our process of trying to find placement. \"I have two mansions in [place] and a place in [place].\" Patient has recited similar endorsements to me on past visits. He voiced no active complaints. Was calm and cooperative throughout interview.     Review of systems:   10 point Review of Systems completed by Ubaldo Ho DO, and is  is negative other than noted in the HPI     Medications:       aspirin  81 mg Oral Daily     atorvastatin  80 mg Oral At Bedtime     benztropine  0.5 mg Oral BID     docusate sodium  100 mg Oral BID     haloperidol  7 mg Oral BID     levothyroxine  88 mcg Oral Daily     memantine  5 mg Oral Daily     nicotine  1 patch Transdermal Daily     nicotine   Transdermal TID     polyethylene glycol  17 g Oral Daily     salmeterol  1 puff Inhalation BID     venlafaxine  75 mg Oral Daily with breakfast     acetaminophen, albuterol, alum & mag hydroxide-simethicone, haloperidol, melatonin, naloxone, nicotine, OLANZapine, OLANZapine zydis, ondansetron **OR** ondansetron    Mental Status Examination:     Appearance:  awake, alert  Eye Contact: "  limited  Speech:  non-pressured, a bit difficult to understand - gravelly.  Language: minimal  Psychomotor Behavior:  no evidence of tardive dyskinesia, dystonia, or tics  Mood:  stable  Affect:  restricted  Thought Process:  paucity  Thought Content:  no evidence of suicidal ideation or homicidal ideation and no observation of response to internal stimuli; some delusional endorsements (chronic)   Oriented to:  self  Attention Span and Concentration:  poor  Recent and Remote Memory:  limited  Fund of Knowledge: delayed  Insight:  limited  Judgment:  limited         Labs/Vitals:   No results found for this or any previous visit (from the past 24 hour(s)).  B/P: 131/99, T: 97.5, P: 95, R: 18      DIagnoses:   1.  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   2.  Other schizophrenia spectrum disorder.   3.  Alcohol use disorder in remission.   4.  Stimulant use disorder, in remission.   5.  Chronic obstructive pulmonary disease.   6.  Hypertension.          Plan:   Patient had a Code called this moring for meandering down the unit stairs. He was calm, pleasant, and redirectable. Known cognitive impairment and impulse control the nidus. Behaviorally he's been doing much better with transition from Zyprexa to Haldol. Would maintain Haldol 7 mg bid and Cogentin 0.5 mg bid without modification.    Attestation:  Patient has been seen and evaluated by me,  Ubaldo Ho,      Visit/Communication Style   VIRTUAL (VIDEO) communication was used to evaluate John due to the COVID pandemic.    John consented to the use of video communication: yes  Video START time: 10:30 am, 9/25/2020  Video STOP time: 10:36 am, 9/25/2020   Patient's location: [facility]   Provider's location during the visit: Home

## 2020-09-25 NOTE — PLAN OF CARE
DATE & TIME: 9/25/2020 5765-8485  Cognitive Concerns/ Orientation : Alert to self, calm and cooperative this shift, redirectable.   BEHAVIOR & AGGRESSION TOOL COLOR: Green   CIWA SCORE: N/A       ABNL VS/O2: VSS on RA, daily.    MOBILITY: Independent in the room  PAIN MANAGMENT: Denies   DIET: Regular; tolerating well. Favorites cola.   BOWEL/BLADDER: Continent, up to the BR  ABNL LAB/BG: NA  DRAIN/DEVICES: No MD DEREK aware.  TELEMETRY RHYTHM: N/A  SKIN: Refused full skin assessment, otherwise dry and flaky. Scattered bruising.   TESTS/PROCEDURES: N/A  D/C DAY/GOALS/PLACE: Pending placement, SW following.  OTHER IMPORTANT INFO: On court hold until 6/31/21.

## 2020-09-25 NOTE — PLAN OF CARE
DATE & TIME: 9/25/2020 5490-8872  Cognitive Concerns/ Orientation : A&O to self only.  BEHAVIOR & AGGRESSION TOOL COLOR: yellow this morning, called code 21 as pt took the stairs to leave the floor, however staff able to redirect the pt back to his room, no intervention needed, long arm sitter in place for safety. Calm and cooperative rest of the shift.   CIWA SCORE: N/A       ABNL VS/O2: VSS on RA, daily.    MOBILITY: Independent in the room, steady  PAIN MANAGMENT: Denies   DIET: Regular; tolerating well. Favorites cola.   BOWEL/BLADDER: Continent, up to the BR  ABNL LAB/BG: NA  DRAIN/DEVICES: No MD DEREK aware.  TELEMETRY RHYTHM: N/A  SKIN: Refused full skin assessment, otherwise dry and flaky. Scattered bruising.   TESTS/PROCEDURES: N/A  D/C DAY/GOALS/PLACE: Pending placement, SW following.  OTHER IMPORTANT INFO: On court hold until 6/31/21. Pt declined nicotine patch.     Update [id number]  Calm and cooperative, door alarm and long arm sitter in place.

## 2020-09-26 PROCEDURE — 25000132 ZZH RX MED GY IP 250 OP 250 PS 637: Performed by: INTERNAL MEDICINE

## 2020-09-26 PROCEDURE — 99231 SBSQ HOSP IP/OBS SF/LOW 25: CPT | Performed by: INTERNAL MEDICINE

## 2020-09-26 PROCEDURE — 25000132 ZZH RX MED GY IP 250 OP 250 PS 637: Performed by: PSYCHIATRY & NEUROLOGY

## 2020-09-26 PROCEDURE — 12000000 ZZH R&B MED SURG/OB

## 2020-09-26 PROCEDURE — 25000132 ZZH RX MED GY IP 250 OP 250 PS 637: Performed by: NURSE PRACTITIONER

## 2020-09-26 RX ADMIN — BENZTROPINE MESYLATE 0.5 MG: 0.5 TABLET ORAL at 08:54

## 2020-09-26 RX ADMIN — BENZTROPINE MESYLATE 0.5 MG: 0.5 TABLET ORAL at 21:11

## 2020-09-26 RX ADMIN — MEMANTINE 5 MG: 5 TABLET ORAL at 08:54

## 2020-09-26 RX ADMIN — ASPIRIN 81 MG: 81 TABLET, COATED ORAL at 08:54

## 2020-09-26 RX ADMIN — ATORVASTATIN CALCIUM 80 MG: 40 TABLET, FILM COATED ORAL at 21:10

## 2020-09-26 RX ADMIN — VENLAFAXINE HYDROCHLORIDE 75 MG: 75 CAPSULE, EXTENDED RELEASE ORAL at 08:54

## 2020-09-26 RX ADMIN — DOCUSATE SODIUM 100 MG: 100 CAPSULE, LIQUID FILLED ORAL at 08:54

## 2020-09-26 RX ADMIN — NICOTINE POLACRILEX 2 MG: 2 GUM, CHEWING ORAL at 11:50

## 2020-09-26 RX ADMIN — LEVOTHYROXINE SODIUM 88 MCG: 88 TABLET ORAL at 08:54

## 2020-09-26 RX ADMIN — DOCUSATE SODIUM 100 MG: 100 CAPSULE, LIQUID FILLED ORAL at 21:10

## 2020-09-26 RX ADMIN — HALOPERIDOL 7 MG: 5 TABLET ORAL at 21:10

## 2020-09-26 RX ADMIN — POLYETHYLENE GLYCOL 3350 17 G: 17 POWDER, FOR SOLUTION ORAL at 08:54

## 2020-09-26 RX ADMIN — SALMETEROL XINAFOATE 1 PUFF: 50 POWDER, METERED ORAL; RESPIRATORY (INHALATION) at 08:55

## 2020-09-26 RX ADMIN — HALOPERIDOL 7 MG: 5 TABLET ORAL at 08:54

## 2020-09-26 ASSESSMENT — ACTIVITIES OF DAILY LIVING (ADL)
ADLS_ACUITY_SCORE: 14

## 2020-09-26 NOTE — PLAN OF CARE
DATE & TIME: 9/26/2020 7034-9826  Cognitive Concerns/ Orientation : A&Ox1 to self only.  BEHAVIOR & AGGRESSION TOOL COLOR: Green.  CIWA SCORE: N/A       ABNL VSS, O2 wnl RA, vitals once daily.   MOBILITY: Independent in the room  PAIN MANAGMENT: Denies   DIET: Combination Diet Safe Tray - NO utensils; tolerating well. Favorites cola.   BOWEL/BLADDER: Continent  ABNL LAB/BG: No labs since 9/9  DRAIN/DEVICES: No MD DEREK aware.  TELEMETRY RHYTHM: N/A  SKIN: Intact some bruising.  TESTS/PROCEDURES: N/A  D/C DAY/GOALS/PLACE: Pending placement, SW following.  OTHER IMPORTANT INFO: On court hold until 6/31/21. Long arm sitter present and door alarm on. Has been declining scheduled Nicotine patch, received prn Nicorette gum x1. Patient is not allowed to have personal belongings, located in locker 12. No cola cans, pens/pencils, utensils in room.

## 2020-09-26 NOTE — PROGRESS NOTES
DATE & TIME: 9.25.20 1900-2300  Cognitive Concerns/ Orientation : A&O1 only to self  BEHAVIOR & AGGRESSION TOOL COLOR: Green. Has been Yellow. Hx of multiple Code 21s.   CIWA SCORE: N/A       ABNL VS/O2: VSS on RA. Vitals 1xday. Completed for rest of night shift.   MOBILITY: Independent in the room  PAIN MANAGMENT: Denies   DIET: Combination Diet Safe Tray - NO utensils; tolerating well. Favorites cola. No utensils/cans/pens in room.  BOWEL/BLADDER: Continent  ABNL LAB/BG: NA  DRAIN/DEVICES: No MD DEREK aware.  TELEMETRY RHYTHM: N/A  SKIN: Intact some bruising.  TESTS/PROCEDURES: N/A  D/C DAY/GOALS/PLACE: Pending placement, SW following.  OTHER IMPORTANT INFO: On court hold until 6/31/21. Pt declined nicotine patch.        Calm and cooperative on this sift, door alarm and long arm sitter in place. No personal belongings. All belongings in locker 12. No cola cans, pens/pencils, utensils in room.

## 2020-09-26 NOTE — PLAN OF CARE
DATE & TIME: 9/26/2020 6565-6461  Cognitive Concerns/ Orientation : A&Ox1 to self only.  BEHAVIOR & AGGRESSION TOOL COLOR: Green. Has been Yellow, hx of multiple Code 21s.   CIWA SCORE: N/A       ABNL VS/O2: Vitals 1xday.  MOBILITY: Independent in the room  PAIN MANAGMENT: Denies   DIET: Combination Diet Safe Tray - NO utensils; tolerating well. Favorites cola.   BOWEL/BLADDER: Continent  ABNL LAB/BG: No labs since 9/9  DRAIN/DEVICES: No MD DEREK aware.  TELEMETRY RHYTHM: N/A  SKIN: Intact some bruising.  TESTS/PROCEDURES: N/A  D/C DAY/GOALS/PLACE: Pending placement, request SW to follow.  OTHER IMPORTANT INFO: On court hold until 6/31/21. Long arm sitter present and door alarm on. Patient is not allowed to have personal belongings, located in locker 12. No cola cans, pens/pencils, utensils in room.

## 2020-09-26 NOTE — PROGRESS NOTES
[facility]    Hospitalist Progress Note      Assessment & Plan   John Juárez is a 54 year old male who was admitted on 9/9/2020 after aggressive behavior at group home, and left, and now they will not take him back. Pt is medically stable for discharge. Awaiting new placement. Appreciate social work assistance.      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  - psych re consulted daily , changed Zyprexa to Haldol 5mg BID and now increase to 7 mg bid, and added Cogentin 0.5mg BID   - Continue other chronic psych meds - Benztropine, memantine,venlafaxine  - Appreciate social work assistance with new placement   - patient is currently under civil commitment and court hold through [facility] until 7/31/21  - appreciate psych help,   Overall remain stable this morning.      HLD  -Continue ASA and atorvastatin      COPD (chronic obstructive pulmonary disease)   -Continue salmeterol      Smoker - no interest in quitting    - nicotine patch     Hypothyroidism  - Continue Levothyroxine      DVT Prophylaxis: Pneumatic Compression Devices  Code Status: Full Code    Disposition: Expected discharge when new placement available.    Carlos Waldrop MD  Text Page  (7am to 6pm)    Interval History    Seen and evaluated in his room today. Remain in his usual state. Asking when he can be discharge. Denies any chest pain, fever, chills, nausea, vomiting, headache or dizziness. Remain calm this morning.     Overnight events reviewed with the nursing staff taking care of the patient.     -Data reviewed today: I reviewed all new labs and imaging results over the last 24 hours. I personally reviewed no images or EKG's today.    Physical Exam   Temp: 99.2  F (37.3  C) Temp src: Oral BP: 111/78 Pulse: 89   Resp: 16 SpO2: 99 % O2 Device: None (Room air)    Vitals:    09/09/20 1754 09/25/20 0632   Weight: 70 kg (154 lb  5.2 oz) 71 kg (156 lb 8 oz)     Vital Signs with Ranges  Temp:  [97.9  F (36.6  C)-99.2  F (37.3  C)] 99.2  F (37.3  C)  Pulse:  [83-89] 89  Resp:  [16-18] 16  BP: (109-111)/(76-78) 111/78  SpO2:  [97 %-99 %] 99 %  I/O last 3 completed shifts:  In: 960 [P.O.:960]  Out: -     Constitutional: Alert,awake, no apparent distress  Respiratory: Clear to auscultation bilaterally, no wheezing  Cardiovascular: regular rate and rhythm  GI: soft and non-tender      Medications       aspirin  81 mg Oral Daily     atorvastatin  80 mg Oral At Bedtime     benztropine  0.5 mg Oral BID     docusate sodium  100 mg Oral BID     haloperidol  7 mg Oral BID     levothyroxine  88 mcg Oral Daily     memantine  5 mg Oral Daily     nicotine  1 patch Transdermal Daily     nicotine   Transdermal TID     polyethylene glycol  17 g Oral Daily     salmeterol  1 puff Inhalation BID     venlafaxine  75 mg Oral Daily with breakfast       Data   No lab results found in last 7 days.    No results found for this or any previous visit (from the past 24 hour(s)).

## 2020-09-27 PROCEDURE — 99231 SBSQ HOSP IP/OBS SF/LOW 25: CPT | Performed by: INTERNAL MEDICINE

## 2020-09-27 PROCEDURE — 25000132 ZZH RX MED GY IP 250 OP 250 PS 637: Performed by: INTERNAL MEDICINE

## 2020-09-27 PROCEDURE — 12000000 ZZH R&B MED SURG/OB

## 2020-09-27 PROCEDURE — 93005 ELECTROCARDIOGRAM TRACING: CPT

## 2020-09-27 PROCEDURE — 25000132 ZZH RX MED GY IP 250 OP 250 PS 637: Performed by: PSYCHIATRY & NEUROLOGY

## 2020-09-27 PROCEDURE — 93010 ELECTROCARDIOGRAM REPORT: CPT | Performed by: INTERNAL MEDICINE

## 2020-09-27 RX ADMIN — HALOPERIDOL 7 MG: 5 TABLET ORAL at 08:04

## 2020-09-27 RX ADMIN — MEMANTINE 5 MG: 5 TABLET ORAL at 08:04

## 2020-09-27 RX ADMIN — ATORVASTATIN CALCIUM 80 MG: 40 TABLET, FILM COATED ORAL at 21:14

## 2020-09-27 RX ADMIN — VENLAFAXINE HYDROCHLORIDE 75 MG: 75 CAPSULE, EXTENDED RELEASE ORAL at 08:03

## 2020-09-27 RX ADMIN — BENZTROPINE MESYLATE 0.5 MG: 0.5 TABLET ORAL at 08:04

## 2020-09-27 RX ADMIN — SALMETEROL XINAFOATE 1 PUFF: 50 POWDER, METERED ORAL; RESPIRATORY (INHALATION) at 08:05

## 2020-09-27 RX ADMIN — HALOPERIDOL 7 MG: 5 TABLET ORAL at 21:14

## 2020-09-27 RX ADMIN — DOCUSATE SODIUM 100 MG: 100 CAPSULE, LIQUID FILLED ORAL at 21:15

## 2020-09-27 RX ADMIN — POLYETHYLENE GLYCOL 3350 17 G: 17 POWDER, FOR SOLUTION ORAL at 08:03

## 2020-09-27 RX ADMIN — LEVOTHYROXINE SODIUM 88 MCG: 88 TABLET ORAL at 08:04

## 2020-09-27 RX ADMIN — BENZTROPINE MESYLATE 0.5 MG: 0.5 TABLET ORAL at 21:14

## 2020-09-27 RX ADMIN — DOCUSATE SODIUM 100 MG: 100 CAPSULE, LIQUID FILLED ORAL at 08:03

## 2020-09-27 RX ADMIN — ASPIRIN 81 MG: 81 TABLET, COATED ORAL at 08:03

## 2020-09-27 RX ADMIN — SALMETEROL XINAFOATE 1 PUFF: 50 POWDER, METERED ORAL; RESPIRATORY (INHALATION) at 21:23

## 2020-09-27 ASSESSMENT — ACTIVITIES OF DAILY LIVING (ADL)
ADLS_ACUITY_SCORE: 10
ADLS_ACUITY_SCORE: 14

## 2020-09-27 NOTE — PLAN OF CARE
DATE & TIME: 9/27/2020 9659-1074  Cognitive Concerns/ Orientation : A&O to self only.  BEHAVIOR & AGGRESSION TOOL COLOR: Green.  CIWA SCORE: N/A       ABNL VSS, O2 wnl RA, vitals once daily.   MOBILITY: Independent in the room  PAIN MANAGMENT: Denies   DIET: Combination Diet Safe Tray - NO utensils; tolerating well. Favorites cola.   BOWEL/BLADDER: Continent  ABNL LAB/BG: No labs since 9/9  DRAIN/DEVICES: No PIV access.   TELEMETRY RHYTHM: N/A  SKIN: Intact some bruising.  TESTS/PROCEDURES: N/A  D/C DAY/GOALS/PLACE: Pending placement, SW following.  OTHER IMPORTANT INFO: On court hold until 6/31/21. Long arm sitter present and door alarm on. Patient is not allowed to have personal belongings, located in locker 12. No cola cans, pens/pencils, utensils in room. EKG today SR.

## 2020-09-27 NOTE — PLAN OF CARE
DATE & TIME: 9/26/2020 1900-2300  Cognitive Concerns/ Orientation : A&Ox1 to self only.  BEHAVIOR & AGGRESSION TOOL COLOR: Green.  CIWA SCORE: N/A       ABNL VSS, O2 wnl RA, vitals once daily.   MOBILITY: Independent in the room  PAIN MANAGMENT: Denies   DIET: Combination Diet Safe Tray - NO utensils; tolerating well. Favorites cola.   BOWEL/BLADDER: Continent  ABNL LAB/BG: No labs since 9/9  DRAIN/DEVICES: No MD DEREK aware.  TELEMETRY RHYTHM: N/A  SKIN: Intact some bruising.  TESTS/PROCEDURES: N/A  D/C DAY/GOALS/PLACE: Pending placement, SW following.  OTHER IMPORTANT INFO: On court hold until 6/31/21. Long arm sitter present and door alarm on. Has been declining scheduled Nicotine patch, prn Nicorette gum also available. Patient is not allowed to have personal belongings, located in locker 12. No cola cans, pens/pencils, utensils in room

## 2020-09-27 NOTE — PLAN OF CARE
DATE & TIME: 9/27/2020 9331-8565  Cognitive Concerns/ Orientation : A&Ox1 to self only.  BEHAVIOR & AGGRESSION TOOL COLOR: Green.  CIWA SCORE: N/A       ABNL VSS: Daily VS, on RA   MOBILITY: Independent in the room  PAIN MANAGMENT: Denies   DIET: Combination Diet Safe Tray - NO utensils; tolerating well. Favorites cola.   BOWEL/BLADDER: Continent  ABNL LAB/BG: No labs since 9/9  DRAIN/DEVICES: No MD DEREK aware.  TELEMETRY RHYTHM: N/A  SKIN: Intact some bruising.  TESTS/PROCEDURES: N/A  D/C DAY/GOALS/PLACE: Pending placement, SW following.  OTHER IMPORTANT INFO: On court hold until 6/31/21. Long arm sitter present and door alarm on. Has been declining scheduled Nicotine patch, received prn Nicorette gum x1. Patient is not allowed to have personal belongings, located in locker 12. No cola cans, pens/pencils, utensils in room.

## 2020-09-27 NOTE — PROGRESS NOTES
[facility]    Hospitalist Progress Note      Assessment & Plan   John Juárez is a 54 year old male who was admitted on 9/9/2020 after aggressive behavior at group home, and left, and now they will not take him back. Pt is medically stable for discharge. Awaiting new placement. Appreciate social work assistance.      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   - psych re consulted daily , changed Zyprexa to Haldol 5mg BID and now increase to 7 mg bid, and added Cogentin 0.5mg BID , will do the EKG to make sure QTc remain stable.   - Continue other chronic psych meds - Benztropine, memantine,venlafaxine  - Appreciate social work assistance with new placement   - patient is currently under civil commitment and court hold through [facility] until 7/31/21  - appreciate psych help,   Overall remain stable this morning. Awaiting placement.      HLD  -Continue ASA and atorvastatin      COPD (chronic obstructive pulmonary disease)   -Continue salmeterol , not in acute exacerbation at this time.      Smoker - no interest in quitting    - nicotine patch     Hypothyroidism  - Continue Levothyroxine      DVT Prophylaxis: Pneumatic Compression Devices  Code Status: Full Code     EKG today  Recheck labs in AM     Disposition: Expected discharge when new placement available.    Carlos Waldrop MD  Text Page  (7am to 6pm)    Interval History    Patient was sleeping at time of my visit, able to woke up easily, offer no complaints.   Denies any fever, chills, chest pain, abdominal pain, nausea or vomiting.     No other significant event overnight.     -Data reviewed today: I reviewed all new labs and imaging results over the last 24 hours. I personally reviewed no images or EKG's today.    Physical Exam   Temp: 98.2  F (36.8  C) Temp src: Oral BP: 99/68 Pulse: 86   Resp: 16 SpO2: 97 % O2  Device: None (Room air)    Vitals:    09/09/20 1754 09/25/20 0632   Weight: 70 kg (154 lb 5.2 oz) 71 kg (156 lb 8 oz)     Vital Signs with Ranges  Temp:  [97.9  F (36.6  C)-98.2  F (36.8  C)] 98.2  F (36.8  C)  Pulse:  [69-86] 86  Resp:  [16] 16  BP: ()/(68-87) 99/68  SpO2:  [97 %-99 %] 97 %  I/O last 3 completed shifts:  In: 1620 [P.O.:1620]  Out: -     Constitutional: Alert,awake, no apparent distress  Respiratory: Clear to auscultation bilaterally, no wheezing  Cardiovascular: regular rate and rhythm  GI: soft and non-tender      Medications       aspirin  81 mg Oral Daily     atorvastatin  80 mg Oral At Bedtime     benztropine  0.5 mg Oral BID     docusate sodium  100 mg Oral BID     haloperidol  7 mg Oral BID     levothyroxine  88 mcg Oral Daily     memantine  5 mg Oral Daily     nicotine  1 patch Transdermal Daily     nicotine   Transdermal TID     polyethylene glycol  17 g Oral Daily     salmeterol  1 puff Inhalation BID     venlafaxine  75 mg Oral Daily with breakfast       Data   No lab results found in last 7 days.    No results found for this or any previous visit (from the past 24 hour(s)).

## 2020-09-28 ENCOUNTER — AMBULATORY - HEALTHEAST (OUTPATIENT)
Dept: OTHER | Facility: CLINIC | Age: 55
End: 2020-09-28

## 2020-09-28 LAB
ALBUMIN SERPL-MCNC: 3.6 G/DL (ref 3.4–5)
ANION GAP SERPL CALCULATED.3IONS-SCNC: 3 MMOL/L (ref 3–14)
BASOPHILS # BLD AUTO: 0.1 10E9/L (ref 0–0.2)
BASOPHILS NFR BLD AUTO: 1.1 %
BUN SERPL-MCNC: 10 MG/DL (ref 7–30)
CALCIUM SERPL-MCNC: 8.6 MG/DL (ref 8.5–10.1)
CHLORIDE SERPL-SCNC: 112 MMOL/L (ref 94–109)
CO2 SERPL-SCNC: 24 MMOL/L (ref 20–32)
CREAT SERPL-MCNC: 0.64 MG/DL (ref 0.66–1.25)
DIFFERENTIAL METHOD BLD: ABNORMAL
EOSINOPHIL # BLD AUTO: 1.2 10E9/L (ref 0–0.7)
EOSINOPHIL NFR BLD AUTO: 21.8 %
ERYTHROCYTE [DISTWIDTH] IN BLOOD BY AUTOMATED COUNT: 13.7 % (ref 10–15)
GFR SERPL CREATININE-BSD FRML MDRD: >90 ML/MIN/{1.73_M2}
GLUCOSE SERPL-MCNC: 94 MG/DL (ref 70–99)
HCT VFR BLD AUTO: 45.1 % (ref 40–53)
HGB BLD-MCNC: 15.9 G/DL (ref 13.3–17.7)
IMM GRANULOCYTES # BLD: 0 10E9/L (ref 0–0.4)
IMM GRANULOCYTES NFR BLD: 0.2 %
LYMPHOCYTES # BLD AUTO: 1.3 10E9/L (ref 0.8–5.3)
LYMPHOCYTES NFR BLD AUTO: 24 %
MCH RBC QN AUTO: 32.9 PG (ref 26.5–33)
MCHC RBC AUTO-ENTMCNC: 35.3 G/DL (ref 31.5–36.5)
MCV RBC AUTO: 93 FL (ref 78–100)
MONOCYTES # BLD AUTO: 0.5 10E9/L (ref 0–1.3)
MONOCYTES NFR BLD AUTO: 9.7 %
NEUTROPHILS # BLD AUTO: 2.4 10E9/L (ref 1.6–8.3)
NEUTROPHILS NFR BLD AUTO: 43.2 %
NRBC # BLD AUTO: 0 10*3/UL
NRBC BLD AUTO-RTO: 0 /100
PHOSPHATE SERPL-MCNC: 2.9 MG/DL (ref 2.5–4.5)
PLATELET # BLD AUTO: 189 10E9/L (ref 150–450)
POTASSIUM SERPL-SCNC: 4 MMOL/L (ref 3.4–5.3)
RBC # BLD AUTO: 4.84 10E12/L (ref 4.4–5.9)
SODIUM SERPL-SCNC: 139 MMOL/L (ref 133–144)
WBC # BLD AUTO: 5.5 10E9/L (ref 4–11)

## 2020-09-28 PROCEDURE — 25000132 ZZH RX MED GY IP 250 OP 250 PS 637: Performed by: PSYCHIATRY & NEUROLOGY

## 2020-09-28 PROCEDURE — 85025 COMPLETE CBC W/AUTO DIFF WBC: CPT | Performed by: INTERNAL MEDICINE

## 2020-09-28 PROCEDURE — 25000132 ZZH RX MED GY IP 250 OP 250 PS 637: Performed by: NURSE PRACTITIONER

## 2020-09-28 PROCEDURE — 25000132 ZZH RX MED GY IP 250 OP 250 PS 637: Performed by: HOSPITALIST

## 2020-09-28 PROCEDURE — 80069 RENAL FUNCTION PANEL: CPT | Performed by: INTERNAL MEDICINE

## 2020-09-28 PROCEDURE — 36415 COLL VENOUS BLD VENIPUNCTURE: CPT | Performed by: INTERNAL MEDICINE

## 2020-09-28 PROCEDURE — 99207 ZZC NON-BILLABLE SERV PER CHARTING: CPT | Performed by: NURSE PRACTITIONER

## 2020-09-28 PROCEDURE — 12000000 ZZH R&B MED SURG/OB

## 2020-09-28 PROCEDURE — 99232 SBSQ HOSP IP/OBS MODERATE 35: CPT | Performed by: HOSPITALIST

## 2020-09-28 PROCEDURE — 25000132 ZZH RX MED GY IP 250 OP 250 PS 637: Performed by: INTERNAL MEDICINE

## 2020-09-28 RX ADMIN — MELATONIN TAB 3 MG 3 MG: 3 TAB at 02:35

## 2020-09-28 RX ADMIN — POLYETHYLENE GLYCOL 3350 17 G: 17 POWDER, FOR SOLUTION ORAL at 08:51

## 2020-09-28 RX ADMIN — VENLAFAXINE HYDROCHLORIDE 75 MG: 75 CAPSULE, EXTENDED RELEASE ORAL at 08:50

## 2020-09-28 RX ADMIN — ACETAMINOPHEN 650 MG: 325 TABLET, FILM COATED ORAL at 02:35

## 2020-09-28 RX ADMIN — LEVOTHYROXINE SODIUM 88 MCG: 88 TABLET ORAL at 08:50

## 2020-09-28 RX ADMIN — SALMETEROL XINAFOATE 1 PUFF: 50 POWDER, METERED ORAL; RESPIRATORY (INHALATION) at 08:53

## 2020-09-28 RX ADMIN — BENZTROPINE MESYLATE 0.5 MG: 0.5 TABLET ORAL at 21:04

## 2020-09-28 RX ADMIN — HALOPERIDOL 7 MG: 5 TABLET ORAL at 08:50

## 2020-09-28 RX ADMIN — HALOPERIDOL 7 MG: 5 TABLET ORAL at 21:04

## 2020-09-28 RX ADMIN — BENZTROPINE MESYLATE 0.5 MG: 0.5 TABLET ORAL at 08:50

## 2020-09-28 RX ADMIN — HALOPERIDOL 5 MG: 5 TABLET ORAL at 02:35

## 2020-09-28 RX ADMIN — NICOTINE 1 PATCH: 21 PATCH, EXTENDED RELEASE TRANSDERMAL at 08:51

## 2020-09-28 RX ADMIN — MEMANTINE 5 MG: 5 TABLET ORAL at 08:50

## 2020-09-28 RX ADMIN — DOCUSATE SODIUM 100 MG: 100 CAPSULE, LIQUID FILLED ORAL at 08:50

## 2020-09-28 RX ADMIN — ASPIRIN 81 MG: 81 TABLET, COATED ORAL at 08:50

## 2020-09-28 RX ADMIN — ATORVASTATIN CALCIUM 80 MG: 40 TABLET, FILM COATED ORAL at 21:04

## 2020-09-28 RX ADMIN — SALMETEROL XINAFOATE 1 PUFF: 50 POWDER, METERED ORAL; RESPIRATORY (INHALATION) at 21:04

## 2020-09-28 RX ADMIN — DOCUSATE SODIUM 100 MG: 100 CAPSULE, LIQUID FILLED ORAL at 21:04

## 2020-09-28 ASSESSMENT — ACTIVITIES OF DAILY LIVING (ADL)
SWALLOWING: 0-->SWALLOWS FOODS/LIQUIDS WITHOUT DIFFICULTY
RETIRED_COMMUNICATION: 0-->UNDERSTANDS/COMMUNICATES WITHOUT DIFFICULTY
AMBULATION: 0-->INDEPENDENT
ADLS_ACUITY_SCORE: 10
TRANSFERRING: 0-->INDEPENDENT
ADLS_ACUITY_SCORE: 10
BATHING: 0-->INDEPENDENT
COGNITION: 2 - DIFFICULTY WITH ORGANIZING THOUGHTS
ADLS_ACUITY_SCORE: 9.5
TOILETING: 0-->INDEPENDENT
ADLS_ACUITY_SCORE: 9.5
ADLS_ACUITY_SCORE: 9.5
RETIRED_EATING: 0-->INDEPENDENT
ADLS_ACUITY_SCORE: 9.5
DRESS: 0-->INDEPENDENT

## 2020-09-28 NOTE — PLAN OF CARE
DATE & TIME: 9/28/2020 0823-5694    Cognitive Concerns/ Orientation : alert to self only.    BEHAVIOR & AGGRESSION TOOL COLOR: green, door alarm on, sitter present.   CIWA SCORE: na    ABNL VS/O2: vss, on RA  MOBILITY: IND in room.   PAIN MANAGMENT: denied pain.   DIET: regular, good appetite.   BOWEL/BLADDER: continent. Pt can not recall last BM, there is no record. Scheduled BM meds given. +BS, abd soft, non tender during exam.   ABNL LAB/BG: na   DRAIN/DEVICES: NO iv access.   TELEMETRY RHYTHM: na   SKIN: intact.   TESTS/PROCEDURES: na   D/C DAY/GOALS/PLACE: pending placement, on court hold.   OTHER IMPORTANT INFO: na   MD/RN ROUNDING SIGNED OFF D/E SHIFT: yes   COMMIT TO SIT DONE AND SIGNED OFF yes

## 2020-09-28 NOTE — PLAN OF CARE
DATE & TIME: 9/28/20 1930 to 0730   Cognitive Concerns/ Orientation : A&O to self only.  BEHAVIOR & AGGRESSION TOOL COLOR: Green.  CIWA SCORE: N/A       ABNL VSS, O2 wnl RA,   MOBILITY: Independent in the room  PAIN MANAGMENT: Denies   DIET: Combination Diet Safe Tray - NO utensils; tolerating well. Favorites cola.   BOWEL/BLADDER: Continent  ABNL LAB/BG: No labs since 9/9  DRAIN/DEVICES: No PIV access.   TELEMETRY RHYTHM: N/A  SKIN: Intact some bruising.  TESTS/PROCEDURES: N/A  D/C DAY/GOALS/PLACE: Pending placement, SW following.  OTHER IMPORTANT INFO: On court hold until 6/31/21. Long arm sitter present and door alarm on. Patient is not allowed to have personal belongings, located in locker 12. No cola cans, pens/pencils, utensils in room. EKG yesterday  SR to monitor due to receiving Haldol.

## 2020-09-28 NOTE — PROGRESS NOTES
[facility]    Medicine Progress Note - Hospitalist Service       Date of Admission:  9/9/2020  Assessment & Plan       John Juárez is a 54 year old male who was admitted on 9/9/2020 after aggressive behavior at group home, and left, and now they will not take him back. Pt is medically stable for discharge. Awaiting new placement. Appreciate social work assistance.      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   - psych re consulted daily, changed Zyprexa to Haldol 5mg BID and now increase to 7 mg bid, and added Cogentin 0.5mg BID, periodic EKG to make sure QTc remain stable.   - Continue other chronic psych meds - Benztropine, memantine,venlafaxine  - Appreciate social work assistance with new placement   - patient is currently under civil commitment and court hold through [facility] until 7/31/21  - appreciate psych help, will ask to see again     HLD  - Continue ASA and atorvastatin      COPD (chronic obstructive pulmonary disease)   - Continue salmeterol , not in acute exacerbation at this time.      Smoker - no interest in quitting   - nicotine patch      Hypothyroidism  - Continue Levothyroxine       Diet: Room Service  Combination Diet Safe Tray - NO utensils    DVT Prophylaxis: Pneumatic Compression Devices  Valentin Catheter: not present  Code Status: Full Code           Disposition Plan   Expected discharge: pending new placement - can't go back to prior group home apparently  Entered: Sanjay Cantu MD 09/28/2020, 8:41 AM       The patient's care was discussed with the Bedside Nurse and Patient.    Sanjay Cantu MD  Hospitalist Service  [facility]    ______________________________________________________________________    Interval History   Seen and examined. No new complaints. Denies pain, sob, fevers or chills.    Data reviewed  today: I reviewed all medications, new labs and imaging results over the last 24 hours. I personally reviewed no images or EKG's today.    Physical Exam   Vital Signs: Temp: 97.6  F (36.4  C) Temp src: Oral BP: 113/81 Pulse: 77   Resp: 18 SpO2: 99 % O2 Device: None (Room air)    Weight: 156 lbs 8 oz    Gen: NAD, pleasant  HEENT: Normocephalic, EOMI, MMM  Resp: no crackles, no wheezes, no increased work of resp  CV: S1S2 heard, reg rhythm, reg rate, no pedal edema  Abdo: soft, nontender, nondistended, bowel sounds present  Ext: calves nontender, well perfused  Neuro: AAOx3, CN grossly intact, no facial asymmetry      Data   Recent Labs   Lab 09/28/20  0854   WBC 5.5   HGB 15.9   MCV 93         POTASSIUM 4.0   CHLORIDE 112*   CO2 24   BUN 10   CR 0.64*   ANIONGAP 3   LESLEE 8.6   GLC 94   ALBUMIN 3.6     No results found for this or any previous visit (from the past 24 hour(s)).

## 2020-09-29 LAB — INTERPRETATION ECG - MUSE: NORMAL

## 2020-09-29 PROCEDURE — 25000132 ZZH RX MED GY IP 250 OP 250 PS 637: Performed by: NURSE PRACTITIONER

## 2020-09-29 PROCEDURE — 12000000 ZZH R&B MED SURG/OB

## 2020-09-29 PROCEDURE — 25000132 ZZH RX MED GY IP 250 OP 250 PS 637: Performed by: INTERNAL MEDICINE

## 2020-09-29 PROCEDURE — 25000132 ZZH RX MED GY IP 250 OP 250 PS 637: Performed by: HOSPITALIST

## 2020-09-29 PROCEDURE — 99232 SBSQ HOSP IP/OBS MODERATE 35: CPT | Performed by: HOSPITALIST

## 2020-09-29 PROCEDURE — 25000132 ZZH RX MED GY IP 250 OP 250 PS 637: Performed by: PSYCHIATRY & NEUROLOGY

## 2020-09-29 PROCEDURE — 99233 SBSQ HOSP IP/OBS HIGH 50: CPT | Performed by: PSYCHIATRY & NEUROLOGY

## 2020-09-29 RX ADMIN — Medication 0.25 MG: at 21:18

## 2020-09-29 RX ADMIN — SALMETEROL XINAFOATE 1 PUFF: 50 POWDER, METERED ORAL; RESPIRATORY (INHALATION) at 09:26

## 2020-09-29 RX ADMIN — NICOTINE 1 PATCH: 21 PATCH, EXTENDED RELEASE TRANSDERMAL at 09:20

## 2020-09-29 RX ADMIN — DOCUSATE SODIUM 100 MG: 100 CAPSULE, LIQUID FILLED ORAL at 21:19

## 2020-09-29 RX ADMIN — BENZTROPINE MESYLATE 0.5 MG: 0.5 TABLET ORAL at 09:18

## 2020-09-29 RX ADMIN — HALOPERIDOL 7 MG: 5 TABLET ORAL at 21:18

## 2020-09-29 RX ADMIN — ASPIRIN 81 MG: 81 TABLET, COATED ORAL at 09:18

## 2020-09-29 RX ADMIN — NICOTINE POLACRILEX 2 MG: 2 GUM, CHEWING ORAL at 21:18

## 2020-09-29 RX ADMIN — DOCUSATE SODIUM 100 MG: 100 CAPSULE, LIQUID FILLED ORAL at 09:18

## 2020-09-29 RX ADMIN — ATORVASTATIN CALCIUM 80 MG: 40 TABLET, FILM COATED ORAL at 21:18

## 2020-09-29 RX ADMIN — VENLAFAXINE HYDROCHLORIDE 75 MG: 75 CAPSULE, EXTENDED RELEASE ORAL at 09:18

## 2020-09-29 RX ADMIN — LEVOTHYROXINE SODIUM 88 MCG: 88 TABLET ORAL at 09:18

## 2020-09-29 RX ADMIN — HALOPERIDOL 7 MG: 5 TABLET ORAL at 09:18

## 2020-09-29 RX ADMIN — MEMANTINE 5 MG: 5 TABLET ORAL at 09:18

## 2020-09-29 RX ADMIN — POLYETHYLENE GLYCOL 3350 17 G: 17 POWDER, FOR SOLUTION ORAL at 09:18

## 2020-09-29 RX ADMIN — NICOTINE POLACRILEX 2 MG: 2 GUM, CHEWING ORAL at 18:25

## 2020-09-29 ASSESSMENT — ACTIVITIES OF DAILY LIVING (ADL)
ADLS_ACUITY_SCORE: 12
ADLS_ACUITY_SCORE: 13
ADLS_ACUITY_SCORE: 12
ADLS_ACUITY_SCORE: 12
ADLS_ACUITY_SCORE: 13
ADLS_ACUITY_SCORE: 13

## 2020-09-29 NOTE — CODE/RAPID RESPONSE
"[facility] SHREE CODE 21 Note  9/28/2020   Time Called: 2154     Assessment & Plan     Major neurocognitive disorder 2/2 TBI/alcohol use disorder with behavioral disturbance and schizophrenia  Upon arrival, pt standing in the nursing staff with several staff around pt.  Pt verbally aggressive, stating \"I'm going to get the ** out of here\".  Per nursing, pt had been in his room when pt came out of his room, was not redirectable, went to the nurse's station to call 911 and proceeded to the elevator.  With presence of security and nursing staff, pt initially went into the nursing station and down another hallway, sat on a recliner in the hallway, refusing to go back to his room and continued to state that he was not going back to his room, but with the assistance of an expert psychiatry associate, pt agreed to walk back to his room.  Pt did not require hands on nor PRN antipsychotics.      -- Accepted verbal de-escalation, distraction, re-direction to mostly cooperative.  -- Sitter bedside.    INTERVENTIONS:  - Continue current plan of care previously recommended by hospitalist    Discussed with and defer further cares to nursing and hospitalist     Interval History     John Juárez is a 54 year old male who was admitted on 9/9/2020 for aggressive behavior.    Medical history significant for: Major neurocognitive disorder, TBI/alcohol use disorder with behavioral disturbance, schizophrenia, HLP, COPD, tobacco use disorder, hypothyroidism     Code Status: Full Code    [facility] SHREE    Allergies   Allergies   Allergen Reactions     Ibuprofen      Latex        Physical Exam   Vital Signs with Ranges:  Temp:  [97.6  F (36.4  C)] 97.6  F (36.4  C)  Pulse:  [75] 75  Resp:  [16] 16  BP: (104)/(94) 104/94  SpO2:  [99 %] 99 %  I/O last 3 completed shifts:  In: 480 [P.O.:480]  Out: -     Constitutional: Pt walking around the hallways with several staff members present, in no " apparent distress  Neck: No upper airway wheezes noted  Pulmonary: In no apparent respiratory distress  Cardiovascular: Appears well perfused  GI: Not assessed  Skin/Integumen: Dry  Neuro: Awake, alert, no obvious focal neuro deficits noted, moving all extremities without difficulty  Psych:  Agitated, verbally aggressive  Extremities: Moving all extremities without difficulty   5 point restraints: 4 extremities + chest  all 4 extremities, warm, good color, < 1sec cap refill  all 4 extremities, straps correct tension - no excessive slack - with elie facing outward    Time Spent on this Encounter   I spent 15 minutes on the unit/floor managing the care of John Bucky Juárez. Over 50% of my time was spent counseling the patient and/or coordinating care regarding services listed in this note.

## 2020-09-29 NOTE — CONSULTS
[facility]  Psychiatry Consultation - Follow-up note      Interim History:   Follow-up requested today to assess the patient's response to treatment.  The patient was seen by Dr. Ho on 9/25  Nursing staff report that the patient has been calm today. Last night, he attempted to elope again. Overall, he has demonstrated less agitation since restarting haldol.    The patient was in his room watching tv. He tells me that he wants to smoke otherwise he did not have other complaints.   He denies AH/VH today.   He denied suicidal and homicidal thoughts.  He did not report any medication side effects.  He had no complaints regarding depressed or anxious mood today.           Medications:       aspirin  81 mg Oral Daily     atorvastatin  80 mg Oral At Bedtime     benztropine  0.5 mg Oral BID     docusate sodium  100 mg Oral BID     haloperidol  7 mg Oral BID     levothyroxine  88 mcg Oral Daily     memantine  5 mg Oral Daily     nicotine  1 patch Transdermal Daily     nicotine   Transdermal TID     polyethylene glycol  17 g Oral Daily     salmeterol  1 puff Inhalation BID     venlafaxine  75 mg Oral Daily with breakfast          Allergies:     Allergies   Allergen Reactions     Ibuprofen      Latex           Labs:   No results found for this or any previous visit (from the past 24 hour(s)).       Psychiatric Examination:     /78 (BP Location: Right arm)   Pulse 75   Temp 97.4  F (36.3  C) (Oral)   Resp 16   Ht 1.829 m (6')   Wt 71 kg (156 lb 8 oz)   SpO2 98%   BMI 21.23 kg/m    Weight is 156 lbs 8 oz  Body mass index is 21.23 kg/m .  Orthostatic Vitals     None            Appearance: dressed in hospital scrubs and slightly unkempt  Attitude:  guarded  Eye Contact:  fair  Mood:  Irritable  Affect:  mood congruent  Speech:  clear, coherent  Psychomotor Behavior:  no evidence of tardive dyskinesia, dystonia, or tics  Throught Process:  linear  Associations:  no loose  associations  Thought Content:  no evidence of suicidal ideation or homicidal ideation and He appeared guarded and mild residual psychosis is suspected he did not appear to be overtly responding to psychotic stimuli  Insight:  limited  Judgement:  limited  Oriented to:  Person only  Attention Span and Concentration:  limited  Recent and Remote Memory:  limited           DIagnoses:     1.  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   2.  Other schizophrenia spectrum disorder.   3.  Alcohol use disorder, in remission.   4.  Stimulant use disorder, in remission.   5.  Chronic obstructive pulmonary disease.   6.  Hypertension.       Recommendations:     Continue haldol 7mg BID  Reduce cogentin to 0.25mg BID to minimize any anticholinergic burden that may worsen his cognition.     He is under involuntary committment for treatment of mental illness while noting a prominent neurocognitive disorder.     Unit  will continue coordinating the patient's treatment status with his county . While assisting with placement options. Pursue placement in a secure memory care setting.     Please reconsult with Psychiatry as needed.   Ze Batres MD   Text Page

## 2020-09-29 NOTE — PLAN OF CARE
DATE & TIME: 9/28/2020 NIGHTS    Cognitive Concerns/ Orientation : alert to self only.    BEHAVIOR & AGGRESSION TOOL COLOR: green, door alarm on, sitter present.   CIWA SCORE: na    ABNL VS/O2: vss, on RA  MOBILITY: IND in room.   PAIN MANAGMENT: denied pain.   DIET: regular, good appetite.   BOWEL/BLADDER: continent. Pt can not recall last BM, there is no record. Scheduled BM meds given. +BS, abd soft, non tender during exam.   ABNL LAB/BG: na   DRAIN/DEVICES: NO iv access.   TELEMETRY RHYTHM: na   SKIN: intact.   TESTS/PROCEDURES: na   D/C DAY/GOALS/PLACE: pending placement, on court hold.   OTHER IMPORTANT INFO: Code 21 called this evening for elopement. Was able to make it to elevator before staff could intervene. Was redirected back to room with security present. No PRN's given.

## 2020-09-29 NOTE — PLAN OF CARE
DATE & TIME: 9/29/2020 0413-8133    Cognitive Concerns/ Orientation : alert to self only. Pt was telling writer that he had mansions in multiple [place]. Writer unsure the credibility of the statements.     BEHAVIOR & AGGRESSION TOOL COLOR: calm, tried to exit room few times, but was easily  redirected back to room.   CIWA SCORE: na    ABNL VS/O2: vss, on RA, lungs clear.   MOBILITY: IND in room.   PAIN MANAGMENT: denied pain.   DIET: regular, good appetite.   BOWEL/BLADDER: pt could not recall last BM, on scheduled BM meds given. Writer offered suppository, pt declined. Abd assessment WDL.   ABNL LAB/BG: na   DRAIN/DEVICES: na   TELEMETRY RHYTHM: na   SKIN: intact, dry skin, offered lotion.   TESTS/PROCEDURES: na   D/C DAY/GOALS/PLACE: pending placement,  following.   OTHER IMPORTANT INFO: continue with door alarm and sitter outside of the door.   MD/RN ROUNDING SIGNED OFF D/E SHIFT: yes   COMMIT TO SIT DONE AND SIGNED OFF yes

## 2020-09-29 NOTE — PROGRESS NOTES
SW    I) Message received from  Gina at [phone]. Patient has been declined by [facility] and [facility], she reports.    Left a message for Gina in return, asking if she could follow up with [facility], [facility] and [facility]. Referrals were sent to these facilities on 9/25/20 per Gina's request/recommendation.     P) Following.

## 2020-09-29 NOTE — PROGRESS NOTES
Code 21 called for elopement. Patient made it to elevator before staff could intervene. Redirected patient to room with security present. Able to talk patient down and no PRN's given. Door alarm back on and 1:1 sitter present.

## 2020-09-29 NOTE — PROGRESS NOTES
[facility]    Medicine Progress Note - Hospitalist Service       Date of Admission:  9/9/2020  Assessment & Plan       John Juárez is a 54 year old male who was admitted on 9/9/2020 after aggressive behavior at group home, and left, and now they will not take him back. Pt is medically stable for discharge. Awaiting new placement. Appreciate social work assistance.      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   - psych re consulted daily, changed Zyprexa to Haldol 5mg BID and now increase to 7 mg bid, and added Cogentin 0.25mg BID (adjusted per Psych 9/29), periodic EKG to make sure QTc remain stable.   - Continue other chronic psych meds - Benztropine, memantine,venlafaxine  - Appreciate social work assistance with new placement   - patient is currently under civil commitment and court hold through [facility] until 7/31/21  - appreciate psych assistance     HLD  - Continue ASA and atorvastatin      COPD (chronic obstructive pulmonary disease)   - Continue salmeterol , not in acute exacerbation at this time.      Smoker - no interest in quitting   - nicotine patch      Hypothyroidism  - Continue Levothyroxine     Diet: Room Service  Combination Diet Safe Tray - NO utensils    DVT Prophylaxis: Pneumatic Compression Devices and Ambulate every shift  Valentin Catheter: not present  Code Status: Full Code           Disposition Plan   Expected discharge: pending placement (on court hold)  Entered: Sanjay Cantu MD 09/29/2020, 9:42 AM       The patient's care was discussed with the Bedside Nurse and Patient.    Sanjay Cantu MD  Hospitalist Service  [facility]    ______________________________________________________________________    Interval History   Seen and examined midday. Patient resting comfortably with no complaints. Reports sleeping well.  Denies pain or SOB. Placement awaited. CT/SW appreciated.    Data reviewed today: I reviewed all medications, new labs and imaging results over the last 24 hours. I personally reviewed no images or EKG's today.    Physical Exam   Vital Signs: Temp: 97.4  F (36.3  C) Temp src: Oral BP: 108/78 Pulse: 75   Resp: 16 SpO2: 98 % O2 Device: None (Room air)    Weight: 156 lbs 8 oz    Gen: NAD, pleasant  HEENT: Normocephalic, EOMI, MMM  Resp: no crackles, no wheezes, no increased work of resp  CV: S1S2 heard, reg rhythm, reg rate, no pedal edema  Abdo: soft, nontender, nondistended, bowel sounds present  Ext: calves nontender, well perfused  Neuro: AA, oriented to self, CN grossly intact, no facial asymmetry      Data   NNL

## 2020-09-30 PROCEDURE — 25000132 ZZH RX MED GY IP 250 OP 250 PS 637: Performed by: PSYCHIATRY & NEUROLOGY

## 2020-09-30 PROCEDURE — 12000000 ZZH R&B MED SURG/OB

## 2020-09-30 PROCEDURE — 25000132 ZZH RX MED GY IP 250 OP 250 PS 637: Performed by: HOSPITALIST

## 2020-09-30 PROCEDURE — 25000132 ZZH RX MED GY IP 250 OP 250 PS 637: Performed by: NURSE PRACTITIONER

## 2020-09-30 PROCEDURE — 99232 SBSQ HOSP IP/OBS MODERATE 35: CPT | Performed by: HOSPITALIST

## 2020-09-30 PROCEDURE — 25000132 ZZH RX MED GY IP 250 OP 250 PS 637: Performed by: INTERNAL MEDICINE

## 2020-09-30 RX ADMIN — MEMANTINE 5 MG: 5 TABLET ORAL at 09:08

## 2020-09-30 RX ADMIN — HALOPERIDOL 7 MG: 5 TABLET ORAL at 21:33

## 2020-09-30 RX ADMIN — ATORVASTATIN CALCIUM 80 MG: 40 TABLET, FILM COATED ORAL at 21:32

## 2020-09-30 RX ADMIN — LEVOTHYROXINE SODIUM 88 MCG: 88 TABLET ORAL at 09:08

## 2020-09-30 RX ADMIN — NICOTINE 1 PATCH: 21 PATCH, EXTENDED RELEASE TRANSDERMAL at 09:07

## 2020-09-30 RX ADMIN — ASPIRIN 81 MG: 81 TABLET, COATED ORAL at 09:08

## 2020-09-30 RX ADMIN — HALOPERIDOL 7 MG: 5 TABLET ORAL at 09:08

## 2020-09-30 RX ADMIN — POLYETHYLENE GLYCOL 3350 17 G: 17 POWDER, FOR SOLUTION ORAL at 09:07

## 2020-09-30 RX ADMIN — Medication 0.25 MG: at 09:08

## 2020-09-30 RX ADMIN — SALMETEROL XINAFOATE 1 PUFF: 50 POWDER, METERED ORAL; RESPIRATORY (INHALATION) at 09:09

## 2020-09-30 RX ADMIN — Medication 0.25 MG: at 21:32

## 2020-09-30 RX ADMIN — VENLAFAXINE HYDROCHLORIDE 75 MG: 75 CAPSULE, EXTENDED RELEASE ORAL at 09:08

## 2020-09-30 RX ADMIN — DOCUSATE SODIUM 100 MG: 100 CAPSULE, LIQUID FILLED ORAL at 09:08

## 2020-09-30 RX ADMIN — NICOTINE POLACRILEX 2 MG: 2 GUM, CHEWING ORAL at 02:33

## 2020-09-30 RX ADMIN — NICOTINE POLACRILEX 2 MG: 2 GUM, CHEWING ORAL at 09:08

## 2020-09-30 RX ADMIN — DOCUSATE SODIUM 100 MG: 100 CAPSULE, LIQUID FILLED ORAL at 21:33

## 2020-09-30 ASSESSMENT — ACTIVITIES OF DAILY LIVING (ADL)
ADLS_ACUITY_SCORE: 13

## 2020-09-30 NOTE — PLAN OF CARE
DATE & TIME: 9/30/2020 2120-1647    Cognitive Concerns/ Orientation : alert to self only.    BEHAVIOR & AGGRESSION TOOL COLOR: green, verbalized desire for smoking, offered nicotine gum. Sitter outside of room due to elopement risk. Door alarm on. On court hold which expires in 7/2021.   CIWA SCORE: na    ABNL VS/O2: vss, on RA, lungs clear.   MOBILITY: IND in room.   PAIN MANAGMENT: denied pain.   DIET: regular.   BOWEL/BLADDER: continent, pt reported last BM on 9/29/2020.   ABNL LAB/BG: na   DRAIN/DEVICES: na   TELEMETRY RHYTHM: na   SKIN: intact.   TESTS/PROCEDURES: na   D/C DAY/GOALS/PLACE: pending placement,  following.   OTHER IMPORTANT INFO: elisabet   MD/RN ROUNDING SIGNED OFF D/E SHIFT: yes   COMMIT TO SIT DONE AND SIGNED OFF yes

## 2020-09-30 NOTE — PROGRESS NOTES
[facility]    Medicine Progress Note - Hospitalist Service       Date of Admission:  9/9/2020  Assessment & Plan       John Juárez is a 54 year old male who was admitted on 9/9/2020 after aggressive behavior at group home, and left, and now they will not take him back. Pt is medically stable for discharge. Awaiting new placement. Appreciate social work assistance.      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  - psych re consulted intermittently, changed Zyprexa to Haldol 5mg BID and now increase to 7 mg bid, and added Cogentin 0.25mg BID (adjusted per Psych 9/29), periodic EKG to make sure QTc remain stable.   - Continue other chronic psych meds - Benztropine, memantine,venlafaxine  - Appreciate social work assistance with new placement   - patient is currently under civil commitment and court hold through [facility] until 7/31/21  - appreciate psych assistance     HLD  - Continue ASA and atorvastatin      COPD (chronic obstructive pulmonary disease)   - Continue salmeterol, not in acute exacerbation at this time.      Smoker - no interest in quitting   - nicotine patch      Hypothyroidism  - Continue Levothyroxine     Diet: Room Service  Combination Diet Safe Tray - NO utensils    DVT Prophylaxis: Pneumatic Compression Devices and Ambulate every shift  Valentin Catheter: not present  Code Status: Full Code           Disposition Plan   Expected discharge: pending placement - he is on court hold thru end of July 2021  Entered: Sanjay Cantu MD 09/30/2020, 9:32 AM       The patient's care was discussed with the Bedside Nurse, Care Coordinator/ and Patient.    Sanjay Cantu MD  Hospitalist Service  [facility]    ______________________________________________________________________    Interval History   Seen and examined this afternoon.  Resting and comfortable. Denies sob or pain. Awaiting placement.     Data reviewed today: I reviewed all medications, new labs and imaging results over the last 24 hours. I personally reviewed no images or EKG's today.    Physical Exam   Vital Signs: Temp: 98.2  F (36.8  C) Temp src: Oral BP: 103/72 Pulse: 71   Resp: 16 SpO2: 98 % O2 Device: None (Room air)    Weight: 156 lbs 8 oz    Gen: NAD, pleasant  HEENT: Normocephalic, EOMI, MMM  Resp: no crackles,  no wheezes, no increased work of resp  CV: S1S2 heard, reg rhythm, reg rate, no pedal edema  Abdo: soft, nontender, nondistended, bowel sounds present  Ext: calves nontender, well perfused  Neuro: AAOx3, CN grossly intact, no facial asymmetry        Data   NNL

## 2020-09-30 NOTE — PLAN OF CARE
DATE & TIME: 9/29/2020 Nights    Cognitive Concerns/ Orientation : alert to self only.     BEHAVIOR & AGGRESSION TOOL COLOR: calm, no elopement this shift.   CIWA SCORE: na    ABNL VS/O2: vss, on RA, lungs clear.   MOBILITY: IND in room.   PAIN MANAGMENT: denied pain.   DIET: regular, good appetite.   BOWEL/BLADDER: continent of bowel and bladder  ABNL LAB/BG: na   DRAIN/DEVICES: na   TELEMETRY RHYTHM: na   SKIN: intact, dry skin  TESTS/PROCEDURES: na   D/C DAY/GOALS/PLACE: pending placement,  following.   OTHER IMPORTANT INFO: continue with door alarm and sitter outside of the door. Administered prn nicorette gum x2 dt urge to smoke

## 2020-09-30 NOTE — PROGRESS NOTES
"SW    I) Spoke with SALVATORE Fuentes ([phone]) who states patient's Brain Injury Waiver expires October 8, 2020. She faxed a OCTAVIA for patient to sign, so she could contact [facility] to start the process for renewal. The form was signed and faxed back to her.     Patient does has a [employer] [place] policy which would likely cover a SNF, but not a group home.    Gina reports she has followed up with [facility] and is awaiting a return call.   [facility] is still assessing, but reported to Gina they don't think patient would be \" a good fit\".  Patient is now on the wait list for the [facility] crisis housing program, Gina reports, but she does not know how long the wait would be.    Gina reports she left a voice mail message at [facility] and [facility] since they have a secured memory care unit. Sent a DOD referral there.    Spoke with Emily ([phone]) at [facility], which has 24 hour customized care and accepts patients with MH and complex medical issues. She asked for a referral, which was faxed with the commitment paperwork to her at fax [phone].    P) Continuing to seek appropriate placement for patient.        "

## 2020-10-01 PROCEDURE — 250N000013 HC RX MED GY IP 250 OP 250 PS 637: Performed by: PSYCHIATRY & NEUROLOGY

## 2020-10-01 PROCEDURE — 250N000013 HC RX MED GY IP 250 OP 250 PS 637: Performed by: HOSPITALIST

## 2020-10-01 PROCEDURE — 250N000013 HC RX MED GY IP 250 OP 250 PS 637: Performed by: INTERNAL MEDICINE

## 2020-10-01 PROCEDURE — 120N000001 HC R&B MED SURG/OB

## 2020-10-01 PROCEDURE — 250N000013 HC RX MED GY IP 250 OP 250 PS 637: Performed by: NURSE PRACTITIONER

## 2020-10-01 PROCEDURE — 99232 SBSQ HOSP IP/OBS MODERATE 35: CPT | Performed by: HOSPITALIST

## 2020-10-01 RX ADMIN — HALOPERIDOL 7 MG: 5 TABLET ORAL at 20:44

## 2020-10-01 RX ADMIN — DOCUSATE SODIUM 100 MG: 100 CAPSULE, LIQUID FILLED ORAL at 09:29

## 2020-10-01 RX ADMIN — LEVOTHYROXINE SODIUM 88 MCG: 88 TABLET ORAL at 09:29

## 2020-10-01 RX ADMIN — SALMETEROL XINAFOATE 1 PUFF: 50 POWDER, METERED ORAL; RESPIRATORY (INHALATION) at 09:34

## 2020-10-01 RX ADMIN — DOCUSATE SODIUM 100 MG: 100 CAPSULE, LIQUID FILLED ORAL at 20:44

## 2020-10-01 RX ADMIN — SALMETEROL XINAFOATE 1 PUFF: 50 POWDER, METERED ORAL; RESPIRATORY (INHALATION) at 20:47

## 2020-10-01 RX ADMIN — NICOTINE 1 PATCH: 21 PATCH, EXTENDED RELEASE TRANSDERMAL at 09:34

## 2020-10-01 RX ADMIN — MEMANTINE 5 MG: 5 TABLET ORAL at 09:28

## 2020-10-01 RX ADMIN — HALOPERIDOL 7 MG: 5 TABLET ORAL at 09:29

## 2020-10-01 RX ADMIN — Medication 0.25 MG: at 09:29

## 2020-10-01 RX ADMIN — VENLAFAXINE HYDROCHLORIDE 75 MG: 75 CAPSULE, EXTENDED RELEASE ORAL at 09:29

## 2020-10-01 RX ADMIN — ATORVASTATIN CALCIUM 80 MG: 40 TABLET, FILM COATED ORAL at 20:44

## 2020-10-01 RX ADMIN — Medication 0.25 MG: at 20:44

## 2020-10-01 RX ADMIN — POLYETHYLENE GLYCOL 3350 17 G: 17 POWDER, FOR SOLUTION ORAL at 09:27

## 2020-10-01 RX ADMIN — ASPIRIN 81 MG: 81 TABLET, COATED ORAL at 09:28

## 2020-10-01 ASSESSMENT — ACTIVITIES OF DAILY LIVING (ADL)
ADLS_ACUITY_SCORE: 13

## 2020-10-01 NOTE — PLAN OF CARE
DATE & TIME: 9/30-10/01/20 Night shift  Cognitive Concerns/ Orientation : A & O x 2 (disoriented to time and place); hallucinating at times; cooperative/re-directable    BEHAVIOR & AGGRESSION TOOL COLOR: Green; can get agitated, frequently asking to go out and smoke  CIWA SCORE: N/A   ABNL VS/O2: VSS on RA   MOBILITY: independent in the room   PAIN MANAGMENT: denies   DIET: Regular; tolerating well   BOWEL/BLADDER: BS active x 4; continent   ABNL LAB/BG: WDL   DRAIN/DEVICES: N/A  TELEMETRY RHYTHM: N/A  SKIN: Scattered bruising; intact; nicotine patch on L. Shoulder  TESTS/PROCEDURES: N/A  D/C DAY/GOALS/PLACE: pending placement; slept most of shift.   OTHER IMPORTANT INFO: Sitter at bedside and door alarm in place due to elopement risk; on court hold until 7/2021; Nursing will continue to monitor and assess.

## 2020-10-01 NOTE — PLAN OF CARE
Cognitive Concerns/ Orientation : A&O x 2, disoriented to time and place. Calm, cooperative and redirectable this shift.   BEHAVIOR & AGGRESSION TOOL COLOR: Green this shift; possibility of red,  can get agitated, frequently asking to go out and smoke  CIWA SCORE: n/a  ABNL VS/O2: VSS on RA   MOBILITY: independent in the room   PAIN MANAGMENT: denies   DIET: Regular; tolerating well   BOWEL/BLADDER: WDL, continent   ABNL LAB/BG: n/a  DRAIN/DEVICES: no PIV  TELEMETRY RHYTHM: n/a  SKIN: Scattered bruising; intact; nicotine patch on R Shoulder  TESTS/PROCEDURES: n/a  D/C DAY/GOALS/PLACE: pending placement, SW following for placement.   OTHER IMPORTANT INFO: Sitter at bedside and door alarm in place due to elopement risk; on court hold until 7/2021.

## 2020-10-01 NOTE — PROGRESS NOTES
DATE & TIME: 9/30/2020; 3757-8285    Cognitive Concerns/ Orientation : A & O x 2 (disoriented to time and place); hallucinating at times; cooperative/re-directable    BEHAVIOR & AGGRESSION TOOL COLOR: Green; can get agitated, frequently asking to go out and smoke  CIWA SCORE: N/A   ABNL VS/O2: VSS on RA   MOBILITY: independent in the room   PAIN MANAGMENT: denies   DIET: Regular; tolerating well   BOWEL/BLADDER: BS active x 4; continent   ABNL LAB/BG: WDL   DRAIN/DEVICES: N/A  TELEMETRY RHYTHM: N/A  SKIN: Scattered bruising; intact; nicotine patch on L. Shoulder  TESTS/PROCEDURES: N/A  D/C DAY/GOALS/PLACE: pending placement   OTHER IMPORTANT INFO: Sitter at bedside and door alarm in place due to elopement risk; on court hold until 7/2021; Nursing will continue to monitor and assess.    MD/RN ROUNDING SIGNED OFF D/E SHIFT: N/A  COMMIT TO SIT DONE AND SIGNED OFF: COMPLETE

## 2020-10-01 NOTE — PROGRESS NOTES
[facility]    Medicine Progress Note - Hospitalist Service       Date of Admission:  9/9/2020  Assessment & Plan       John Juárez is a 54 year old male who was admitted on 9/9/2020 after aggressive behavior at group home, and left, and now they will not take him back. Pt is medically stable for discharge. Awaiting new placement. Appreciate social work assistance.      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  - psych re consulted intermittently, changed Zyprexa to Haldol 5mg BID and now increase to 7 mg bid, and added Cogentin 0.25mg BID (adjusted per Psych 9/29), periodic EKG to make sure QTc remain stable.   - Continue other chronic psych meds - Benztropine, memantine,venlafaxine  - Appreciate social work assistance with new placement   - patient is currently under civil commitment and court hold through [facility] until 7/31/21  - appreciate psych assistance     HLD  - Continue ASA and atorvastatin      COPD (chronic obstructive pulmonary disease)   - Continue salmeterol, not in acute exacerbation at this time.      Smoker - no interest in quitting   - nicotine patch      Hypothyroidism  - Continue Levothyroxine     Diet: Room Service  Combination Diet Safe Tray - NO utensils    DVT Prophylaxis: Pneumatic Compression Devices and Ambulate every shift  Valentin Catheter: not present  Code Status: Full Code           Disposition Plan   Expected discharge: pending placement - he is on court hold thru end of July 2021  Entered: Bora Walls DO 10/01/2020, 8:41 AM       The patient's care was discussed with the Bedside Nurse, Care Coordinator/ and Patient.    Bora Walls DO  Hospitalist Service  [facility]    ______________________________________________________________________    Interval History   Seen and examined this morning,  he is resting after food. No complaints    Data reviewed today: I reviewed all medications, new labs and imaging results over the last 24 hours. I personally reviewed no images or EKG's today.    Physical Exam   Vital Signs: Temp: 97.5  F (36.4  C) Temp src: Oral BP: 97/67 Pulse: 73   Resp: 16 SpO2: 100 % O2 Device: None (Room air)    Weight: 156 lbs 8 oz    Gen: NAD, pleasant  HEENT: Normocephalic, EOMI, MMM  Resp: no crackles,  no wheezes, no increased work of resp  CV: S1S2 heard, reg rhythm, reg rate, no pedal edema  Abdo: soft, nontender, nondistended, bowel sounds present  Ext: calves nontender, well perfused  Neuro: AAOx3, CN grossly intact, no facial asymmetry        Data   NNL

## 2020-10-02 PROCEDURE — 120N000001 HC R&B MED SURG/OB

## 2020-10-02 PROCEDURE — 250N000013 HC RX MED GY IP 250 OP 250 PS 637: Performed by: PSYCHIATRY & NEUROLOGY

## 2020-10-02 PROCEDURE — 250N000013 HC RX MED GY IP 250 OP 250 PS 637: Performed by: NURSE PRACTITIONER

## 2020-10-02 PROCEDURE — 250N000013 HC RX MED GY IP 250 OP 250 PS 637: Performed by: HOSPITALIST

## 2020-10-02 PROCEDURE — 99231 SBSQ HOSP IP/OBS SF/LOW 25: CPT | Performed by: HOSPITALIST

## 2020-10-02 PROCEDURE — 250N000013 HC RX MED GY IP 250 OP 250 PS 637: Performed by: INTERNAL MEDICINE

## 2020-10-02 RX ADMIN — NICOTINE 1 PATCH: 21 PATCH, EXTENDED RELEASE TRANSDERMAL at 09:22

## 2020-10-02 RX ADMIN — POLYETHYLENE GLYCOL 3350 17 G: 17 POWDER, FOR SOLUTION ORAL at 09:21

## 2020-10-02 RX ADMIN — SALMETEROL XINAFOATE 1 PUFF: 50 POWDER, METERED ORAL; RESPIRATORY (INHALATION) at 21:05

## 2020-10-02 RX ADMIN — ASPIRIN 81 MG: 81 TABLET, COATED ORAL at 09:21

## 2020-10-02 RX ADMIN — MEMANTINE 5 MG: 5 TABLET ORAL at 09:22

## 2020-10-02 RX ADMIN — DOCUSATE SODIUM 100 MG: 100 CAPSULE, LIQUID FILLED ORAL at 09:22

## 2020-10-02 RX ADMIN — Medication 0.25 MG: at 09:21

## 2020-10-02 RX ADMIN — HALOPERIDOL 7 MG: 5 TABLET ORAL at 09:22

## 2020-10-02 RX ADMIN — DOCUSATE SODIUM 100 MG: 100 CAPSULE, LIQUID FILLED ORAL at 21:03

## 2020-10-02 RX ADMIN — HALOPERIDOL 7 MG: 5 TABLET ORAL at 21:03

## 2020-10-02 RX ADMIN — Medication 0.25 MG: at 21:04

## 2020-10-02 RX ADMIN — LEVOTHYROXINE SODIUM 88 MCG: 88 TABLET ORAL at 09:21

## 2020-10-02 RX ADMIN — VENLAFAXINE HYDROCHLORIDE 75 MG: 75 CAPSULE, EXTENDED RELEASE ORAL at 09:22

## 2020-10-02 RX ADMIN — ATORVASTATIN CALCIUM 80 MG: 40 TABLET, FILM COATED ORAL at 21:04

## 2020-10-02 RX ADMIN — SALMETEROL XINAFOATE 1 PUFF: 50 POWDER, METERED ORAL; RESPIRATORY (INHALATION) at 09:22

## 2020-10-02 ASSESSMENT — ACTIVITIES OF DAILY LIVING (ADL)
ADLS_ACUITY_SCORE: 13

## 2020-10-02 NOTE — PLAN OF CARE
Date: 10/1/20 3628-2003  Cognitive Concerns/ Orientation : A&O x 2, disoriented to time and place, situation. Calm, cooperative and redirectable this shift.   BEHAVIOR & AGGRESSION TOOL COLOR: Green this shift; possibility of red,  can get agitated, frequently asking to go out and smoke  CIWA SCORE: n/a  ABNL VS/O2: VSS on RA   MOBILITY: independent in the room   PAIN MANAGMENT: denies   DIET: Regular; tolerating well   BOWEL/BLADDER: WDL, continent   ABNL LAB/BG: n/a  DRAIN/DEVICES: no PIV  TELEMETRY RHYTHM: n/a  SKIN: Scattered bruising; intact; nicotine patch on R Shoulder  TESTS/PROCEDURES: n/a  D/C DAY/GOALS/PLACE: pending placement, SW following for placement.   OTHER IMPORTANT INFO: door alarm in place due to elopement risk; on court hold until 7/2021.

## 2020-10-02 NOTE — PROGRESS NOTES
D: DAVINA following for discharge planning.   I: Spoke with Joshua from [facility]. She continues to assess pt and may have a spot for him. She will speak with his CM, Crys. Crys sent at [facility] and SW had pt sign and faxed it back to her. Joshua will also see if they are able to accept his brain injury waiver for payment. Crys also requested info faxed to [place] at [place]. She spoke to them. No decision yet from [facility].   P: DAVINA following.     JORDY Drummond, LGSW  [phone]  [facility]

## 2020-10-02 NOTE — PLAN OF CARE
Cognitive Concerns/ Orientation : A&O x 2, disoriented to time and place. Flat affect. Calm, cooperative and redirectable this shift.   BEHAVIOR & AGGRESSION TOOL COLOR: Green this shift; possibility of red,  can get agitated, frequently asking to go out and smoke  CIWA SCORE: n/a  ABNL VS/O2: VSS on RA   MOBILITY: independent in the room   PAIN MANAGMENT: denies   DIET: Regular; tolerating well, good appetite.   BOWEL/BLADDER: WDL, continent   ABNL LAB/BG: n/a  DRAIN/DEVICES: no PIV  TELEMETRY RHYTHM: n/a  SKIN: Scattered bruising; intact; nicotine patch on L Shoulder  TESTS/PROCEDURES: n/a  D/C DAY/GOALS/PLACE: pending placement, SW following for placement.   OTHER IMPORTANT INFO: Door alarm in place due to elopement risk; on court hold until 7/2021.

## 2020-10-02 NOTE — PROGRESS NOTES
[facility]    Medicine Progress Note - Hospitalist Service       Date of Admission:  9/9/2020  Assessment & Plan       John Juárez is a 54 year old male who was admitted on 9/9/2020 after aggressive behavior at group home, and left, and now they will not take him back. Pt is medically stable for discharge. Awaiting new placement. Appreciate social work assistance.      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  - psych re consulted intermittently, changed Zyprexa to Haldol 5mg BID and now increase to 7 mg bid, and added Cogentin 0.25mg BID (adjusted per Psych 9/29), periodic EKG to make sure QTc remain stable.   - Continue other chronic psych meds - Benztropine, memantine,venlafaxine  - Appreciate social work assistance with new placement   - patient is currently under civil commitment and court hold through [facility] until 7/31/21  - appreciate psych assistance     HLD  - Continue ASA and atorvastatin      COPD (chronic obstructive pulmonary disease)   - Continue salmeterol, not in acute exacerbation at this time.      Smoker - no interest in quitting   - nicotine patch      Hypothyroidism  - Continue Levothyroxine     Diet: Room Service  Combination Diet Safe Tray - NO utensils    DVT Prophylaxis: Pneumatic Compression Devices and Ambulate every shift  Valentin Catheter: not present  Code Status: Full Code           Disposition Plan   Expected discharge: pending placement - he is on court hold thru end of July 2021  Entered: Bora Walls DO 10/02/2020, 4:01 PM       The patient's care was discussed with the Bedside Nurse, Care Coordinator/ and Patient.    Bora Walls DO  Hospitalist Service  [facility]    ______________________________________________________________________    Interval History   Seen and examined this morning,  he is resting after food. No complaints. Wants to be left alone. Nursing reports he is independent.     Data reviewed today: I reviewed all medications, new labs and imaging results over the last 24 hours. I personally reviewed no images or EKG's today.    Physical Exam   Vital Signs: Temp: 98  F (36.7  C) Temp src: Oral BP: 111/80 Pulse: 77   Resp: 18 SpO2: 99 % O2 Device: None (Room air)    Weight: 156 lbs 8 oz    Gen: NAD, pleasant  HEENT: Normocephalic, EOMI, MMM  Resp: no crackles,  no wheezes, no increased work of resp  CV: S1S2 heard, reg rhythm, reg rate, no pedal edema  Abdo: soft, nontender, nondistended, bowel sounds present  Ext: calves nontender, well perfused  Neuro: AAOx3, CN grossly intact, no facial asymmetry        Data   NNL

## 2020-10-02 NOTE — PLAN OF CARE
DATE & TIME: 10/1 from 1900 to 0730    Cognitive Concerns/ Orientation : alert to self only. Disoriented to place, time, and situation    BEHAVIOR & AGGRESSION TOOL COLOR: Green most of the shift, could be yellow at times  CIWA SCORE: N/A   ABNL VS/O2: Dialy Vitals  MOBILITY: Up and independent  PAIN MANAGMENT: denied   DIET: Regular   BOWEL/BLADDER: Continent  ABNL LAB/BG:   DRAIN/DEVICES: No IV access  TELEMETRY RHYTHM: N/A  SKIN: Scab on right eye, bruised, otherwise intact  TESTS/PROCEDURES: N/A  D/C DAY/GOALS/PLACE: Discharge pending in progress for placement.  following  OTHER IMPORTANT INFO:   MD/RN ROUNDING SIGNED OFF D/E SHIFT: N/A  COMMIT TO SIT DONE AND SIGNED OFF Yes

## 2020-10-03 PROCEDURE — 250N000013 HC RX MED GY IP 250 OP 250 PS 637: Performed by: INTERNAL MEDICINE

## 2020-10-03 PROCEDURE — 250N000013 HC RX MED GY IP 250 OP 250 PS 637: Performed by: HOSPITALIST

## 2020-10-03 PROCEDURE — 250N000013 HC RX MED GY IP 250 OP 250 PS 637: Performed by: NURSE PRACTITIONER

## 2020-10-03 PROCEDURE — 99231 SBSQ HOSP IP/OBS SF/LOW 25: CPT | Performed by: HOSPITALIST

## 2020-10-03 PROCEDURE — 120N000001 HC R&B MED SURG/OB

## 2020-10-03 PROCEDURE — 250N000013 HC RX MED GY IP 250 OP 250 PS 637: Performed by: PSYCHIATRY & NEUROLOGY

## 2020-10-03 RX ADMIN — DOCUSATE SODIUM 100 MG: 100 CAPSULE, LIQUID FILLED ORAL at 20:36

## 2020-10-03 RX ADMIN — POLYETHYLENE GLYCOL 3350 17 G: 17 POWDER, FOR SOLUTION ORAL at 09:09

## 2020-10-03 RX ADMIN — NICOTINE 1 PATCH: 21 PATCH, EXTENDED RELEASE TRANSDERMAL at 09:09

## 2020-10-03 RX ADMIN — Medication 0.25 MG: at 09:14

## 2020-10-03 RX ADMIN — VENLAFAXINE HYDROCHLORIDE 75 MG: 75 CAPSULE, EXTENDED RELEASE ORAL at 09:08

## 2020-10-03 RX ADMIN — ASPIRIN 81 MG: 81 TABLET, COATED ORAL at 09:08

## 2020-10-03 RX ADMIN — ATORVASTATIN CALCIUM 80 MG: 40 TABLET, FILM COATED ORAL at 20:36

## 2020-10-03 RX ADMIN — LEVOTHYROXINE SODIUM 88 MCG: 88 TABLET ORAL at 09:08

## 2020-10-03 RX ADMIN — SALMETEROL XINAFOATE 1 PUFF: 50 POWDER, METERED ORAL; RESPIRATORY (INHALATION) at 20:37

## 2020-10-03 RX ADMIN — MEMANTINE 5 MG: 5 TABLET ORAL at 09:08

## 2020-10-03 RX ADMIN — Medication 0.25 MG: at 20:36

## 2020-10-03 RX ADMIN — HALOPERIDOL 7 MG: 5 TABLET ORAL at 09:08

## 2020-10-03 RX ADMIN — SALMETEROL XINAFOATE 1 PUFF: 50 POWDER, METERED ORAL; RESPIRATORY (INHALATION) at 09:09

## 2020-10-03 RX ADMIN — DOCUSATE SODIUM 100 MG: 100 CAPSULE, LIQUID FILLED ORAL at 09:08

## 2020-10-03 RX ADMIN — HALOPERIDOL 7 MG: 5 TABLET ORAL at 20:36

## 2020-10-03 ASSESSMENT — ACTIVITIES OF DAILY LIVING (ADL)
ADLS_ACUITY_SCORE: 12
ADLS_ACUITY_SCORE: 12
ADLS_ACUITY_SCORE: 13
ADLS_ACUITY_SCORE: 13
ADLS_ACUITY_SCORE: 12
ADLS_ACUITY_SCORE: 13

## 2020-10-03 NOTE — PROGRESS NOTES
[facility]    Medicine Progress Note - Hospitalist Service       Date of Admission:  9/9/2020  Assessment & Plan       John Juárez is a 54 year old male who was admitted on 9/9/2020 after aggressive behavior at group home, and left, and now they will not take him back. Pt is medically stable for discharge. Awaiting new placement. Appreciate social work assistance.      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  - psych re consulted intermittently, changed Zyprexa to Haldol 5mg BID and now increase to 7 mg bid, and added Cogentin 0.25mg BID (adjusted per Psych 9/29), periodic EKG to make sure QTc remain stable.   - Continue other chronic psych meds - Benztropine, memantine,venlafaxine  - Appreciate social work assistance with new placement   - patient is currently under civil commitment and court hold through [facility] until 7/31/21  - appreciate psych assistance     HLD  - Continue ASA and atorvastatin      COPD (chronic obstructive pulmonary disease)   - Continue salmeterol, not in acute exacerbation at this time.      Smoker - no interest in quitting   - nicotine patch      Hypothyroidism  - Continue Levothyroxine     Diet: Room Service  Combination Diet Safe Tray - NO utensils    DVT Prophylaxis: Pneumatic Compression Devices and Ambulate every shift  Valentin Catheter: not present  Code Status: Full Code           Disposition Plan   Expected discharge: pending placement - he is on court hold thru end of July 2021  Entered: Bora Walls DO 10/03/2020, 5:26 PM       The patient's care was discussed with the Bedside Nurse, Care Coordinator/ and Patient.    Bora Walls DO  Hospitalist Service  [facility]    ______________________________________________________________________    Interval History   Seen and examined this morning,  a bit more awake today, wants to be left alone. Denies issues    Data reviewed today: I reviewed all medications, new labs and imaging results over the last 24 hours. I personally reviewed no images or EKG's today.    Physical Exam   Vital Signs: Temp: 98  F (36.7  C) Temp src: Oral BP: 95/65 Pulse: 80   Resp: 16 SpO2: 100 % O2 Device: None (Room air)    Weight: 156 lbs 8 oz    Gen: NAD, pleasant  HEENT: Normocephalic, EOMI, MMM  Resp: no crackles,  no wheezes, no increased work of resp  CV: S1S2 heard, reg rhythm, reg rate, no pedal edema  Abdo: soft, nontender, nondistended, bowel sounds present  Ext: calves nontender, well perfused  Neuro: AAOx3, CN grossly intact, no facial asymmetry        Data   NNL

## 2020-10-03 NOTE — PLAN OF CARE
DATE & TIME: 10/3/2020 7-3pm  Cognitive Concerns/ Orientation : A&O x 1-2, forgetful. Calm, cooperative and redirectable this shift.   BEHAVIOR & AGGRESSION TOOL COLOR: Green this shift; possibility of red,  can get agitated, can frequently ask to go out and smoke  CIWA SCORE: n/a  ABNL VS/O2: VSS on RA, daily VS  MOBILITY: independent in the room   PAIN MANAGMENT: denies   DIET: Regular; tolerating well, good appetite. BOWEL/BLADDER: WDL, continent   ABNL LAB/BG: n/a  DRAIN/DEVICES: no PIV  TELEMETRY RHYTHM: n/a  SKIN: Scattered bruising; intact; nicotine PIP  TESTS/PROCEDURES: n/a  D/C DAY/GOALS/PLACE: pending placement, SW following for placement.   OTHER IMPORTANT INFO: Door alarm in place due to elopement risk; on court hold until 7/2021.

## 2020-10-03 NOTE — PLAN OF CARE
DATE & TIME: 10/2/2020 1807-5348  Cognitive Concerns/ Orientation : A&O x 1-2, forgetful. Calm, cooperative and redirectable this shift.   BEHAVIOR & AGGRESSION TOOL COLOR: Green this shift; possibility of red,  can get agitated, can frequently ask to go out and smoke  CIWA SCORE: n/a  ABNL VS/O2: VSS on RA, daily VS  MOBILITY: independent in the room   PAIN MANAGMENT: denies   DIET: Regular; tolerating well, good appetite.   BOWEL/BLADDER: WDL, continent   ABNL LAB/BG: n/a  DRAIN/DEVICES: no PIV  TELEMETRY RHYTHM: n/a  SKIN: Scattered bruising; intact; nicotine PIP  TESTS/PROCEDURES: n/a  D/C DAY/GOALS/PLACE: pending placement, SW following for placement.   OTHER IMPORTANT INFO: Door alarm in place due to elopement risk; on court hold until 7/2021.

## 2020-10-04 PROCEDURE — 99231 SBSQ HOSP IP/OBS SF/LOW 25: CPT | Performed by: HOSPITALIST

## 2020-10-04 PROCEDURE — 250N000013 HC RX MED GY IP 250 OP 250 PS 637: Performed by: HOSPITALIST

## 2020-10-04 PROCEDURE — 250N000013 HC RX MED GY IP 250 OP 250 PS 637: Performed by: PSYCHIATRY & NEUROLOGY

## 2020-10-04 PROCEDURE — 120N000001 HC R&B MED SURG/OB

## 2020-10-04 PROCEDURE — 250N000013 HC RX MED GY IP 250 OP 250 PS 637: Performed by: INTERNAL MEDICINE

## 2020-10-04 PROCEDURE — 250N000013 HC RX MED GY IP 250 OP 250 PS 637: Performed by: NURSE PRACTITIONER

## 2020-10-04 RX ADMIN — VENLAFAXINE HYDROCHLORIDE 75 MG: 75 CAPSULE, EXTENDED RELEASE ORAL at 09:28

## 2020-10-04 RX ADMIN — ATORVASTATIN CALCIUM 80 MG: 40 TABLET, FILM COATED ORAL at 22:15

## 2020-10-04 RX ADMIN — Medication 0.25 MG: at 22:15

## 2020-10-04 RX ADMIN — Medication 0.25 MG: at 09:32

## 2020-10-04 RX ADMIN — DOCUSATE SODIUM 100 MG: 100 CAPSULE, LIQUID FILLED ORAL at 22:15

## 2020-10-04 RX ADMIN — MEMANTINE 5 MG: 5 TABLET ORAL at 09:28

## 2020-10-04 RX ADMIN — DOCUSATE SODIUM 100 MG: 100 CAPSULE, LIQUID FILLED ORAL at 09:27

## 2020-10-04 RX ADMIN — SALMETEROL XINAFOATE 1 PUFF: 50 POWDER, METERED ORAL; RESPIRATORY (INHALATION) at 22:18

## 2020-10-04 RX ADMIN — SALMETEROL XINAFOATE 1 PUFF: 50 POWDER, METERED ORAL; RESPIRATORY (INHALATION) at 09:32

## 2020-10-04 RX ADMIN — HALOPERIDOL 7 MG: 5 TABLET ORAL at 22:15

## 2020-10-04 RX ADMIN — HALOPERIDOL 7 MG: 5 TABLET ORAL at 09:28

## 2020-10-04 RX ADMIN — POLYETHYLENE GLYCOL 3350 17 G: 17 POWDER, FOR SOLUTION ORAL at 09:27

## 2020-10-04 RX ADMIN — ASPIRIN 81 MG: 81 TABLET, COATED ORAL at 09:27

## 2020-10-04 RX ADMIN — LEVOTHYROXINE SODIUM 88 MCG: 88 TABLET ORAL at 09:28

## 2020-10-04 RX ADMIN — NICOTINE 1 PATCH: 21 PATCH, EXTENDED RELEASE TRANSDERMAL at 09:32

## 2020-10-04 ASSESSMENT — ACTIVITIES OF DAILY LIVING (ADL)
ADLS_ACUITY_SCORE: 13

## 2020-10-04 NOTE — PLAN OF CARE
DATE & TIME: 10/3/2020 7463-5337  Cognitive Concerns/ Orientation : A&O x 2, forgetful. Calm, cooperative and redirectable this shift.   BEHAVIOR & AGGRESSION TOOL COLOR: Green this shift; possibility of red,  can get agitated, can frequently ask to go out and smoke  CIWA SCORE: n/a  ABNL VS/O2: VSS on RA, daily VS  MOBILITY: independent in the room   PAIN MANAGMENT: denies   DIET: Regular; tolerating well, good appetite. BOWEL/BLADDER: WDL, continent   ABNL LAB/BG: n/a  DRAIN/DEVICES: no PIV  TELEMETRY RHYTHM: n/a  SKIN: Scattered bruising; intact; nicotine patch removed by patient, declines a new one at this time  TESTS/PROCEDURES: n/a  D/C DAY/GOALS/PLACE: pending placement, SW following for placement.   OTHER IMPORTANT INFO: Door alarm in place due to elopement risk; on court hold until 7/2021.

## 2020-10-04 NOTE — PLAN OF CARE
DATE & TIME: 10/4/2020 7-3pm  Cognitive Concerns/ Orientation : A&O x 2, forgetful. Calm, cooperative and redirectable this shift.   BEHAVIOR & AGGRESSION TOOL COLOR: Green this shift; possibility of red,  can get agitated, can frequently ask to go out and smoke  CIWA SCORE: n/a  ABNL VS/O2: VSS on RA, daily VS  MOBILITY: independent in the room   PAIN MANAGMENT: denies   DIET: Regular; tolerating well, good appetite. BOWEL/BLADDER: WDL, continent   ABNL LAB/BG: n/a  DRAIN/DEVICES: no PIV  TELEMETRY RHYTHM: n/a  SKIN: Scattered bruising; intact; nicotine patch on right arm  TESTS/PROCEDURES: n/a  D/C DAY/GOALS/PLACE: pending placement, SW following for placement.   OTHER IMPORTANT INFO: Door alarm in place due to elopement risk; on court hold until 7/2021.

## 2020-10-04 NOTE — PROGRESS NOTES
[facility]    Medicine Progress Note - Hospitalist Service       Date of Admission:  9/9/2020  Assessment & Plan       John Juárez is a 54 year old male who was admitted on 9/9/2020 after aggressive behavior at group home, and left, and now they will not take him back. Pt is medically stable for discharge. Awaiting new placement. Appreciate social work assistance.      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  - psych re consulted intermittently, changed Zyprexa to Haldol 5mg BID and now increase to 7 mg bid, and added Cogentin 0.25mg BID (adjusted per Psych 9/29), periodic EKG to make sure QTc remain stable.   - Continue other chronic psych meds - Benztropine, memantine,venlafaxine  - Appreciate social work assistance with new placement   - patient is currently under civil commitment and court hold through [facility] until 7/31/21  - appreciate psych assistance     HLD  - Continue ASA and atorvastatin      COPD (chronic obstructive pulmonary disease)   - Continue salmeterol, not in acute exacerbation at this time.      Smoker - no interest in quitting   - nicotine patch      Hypothyroidism  - Continue Levothyroxine     Diet: Room Service  Combination Diet Safe Tray - NO utensils    DVT Prophylaxis: Pneumatic Compression Devices and Ambulate every shift  Valentin Catheter: not present  Code Status: Full Code           Disposition Plan   Expected discharge: pending placement - he is on court hold thru end of July 2021  Entered: Bora Walls DO 10/04/2020, 4:11 PM       The patient's care was discussed with the Bedside Nurse, Care Coordinator/ and Patient.    Bora Walls DO  Hospitalist Service  [facility]    ______________________________________________________________________    Interval History   Seen and examined this morning,  sitting in the chair. Looks forward to the [employer] game    Data reviewed today: I reviewed all medications, new labs and imaging results over the last 24 hours. I personally reviewed no images or EKG's today.    Physical Exam   Vital Signs:           Resp: 15        Weight: 156 lbs 8 oz    Gen: NAD, pleasant  HEENT: Normocephalic, EOMI, MMM  Resp: no crackles,  no wheezes, no increased work of resp  CV: S1S2 heard, reg rhythm, reg rate, no pedal edema  Abdo: soft, nontender, nondistended, bowel sounds present  Ext: calves nontender, well perfused  Neuro: AAOx3, CN grossly intact, no facial asymmetry        Data   NNL

## 2020-10-05 PROCEDURE — 120N000001 HC R&B MED SURG/OB

## 2020-10-05 PROCEDURE — 250N000013 HC RX MED GY IP 250 OP 250 PS 637: Performed by: INTERNAL MEDICINE

## 2020-10-05 PROCEDURE — 250N000013 HC RX MED GY IP 250 OP 250 PS 637: Performed by: HOSPITALIST

## 2020-10-05 PROCEDURE — 250N000013 HC RX MED GY IP 250 OP 250 PS 637: Performed by: NURSE PRACTITIONER

## 2020-10-05 PROCEDURE — 99231 SBSQ HOSP IP/OBS SF/LOW 25: CPT | Performed by: HOSPITALIST

## 2020-10-05 PROCEDURE — 250N000013 HC RX MED GY IP 250 OP 250 PS 637: Performed by: PSYCHIATRY & NEUROLOGY

## 2020-10-05 RX ADMIN — DOCUSATE SODIUM 100 MG: 100 CAPSULE, LIQUID FILLED ORAL at 22:51

## 2020-10-05 RX ADMIN — LEVOTHYROXINE SODIUM 88 MCG: 88 TABLET ORAL at 08:31

## 2020-10-05 RX ADMIN — NICOTINE 1 PATCH: 21 PATCH, EXTENDED RELEASE TRANSDERMAL at 08:32

## 2020-10-05 RX ADMIN — ASPIRIN 81 MG: 81 TABLET, COATED ORAL at 08:31

## 2020-10-05 RX ADMIN — Medication 0.25 MG: at 08:35

## 2020-10-05 RX ADMIN — SALMETEROL XINAFOATE 1 PUFF: 50 POWDER, METERED ORAL; RESPIRATORY (INHALATION) at 22:54

## 2020-10-05 RX ADMIN — HALOPERIDOL 7 MG: 5 TABLET ORAL at 22:52

## 2020-10-05 RX ADMIN — POLYETHYLENE GLYCOL 3350 17 G: 17 POWDER, FOR SOLUTION ORAL at 08:30

## 2020-10-05 RX ADMIN — MEMANTINE 5 MG: 5 TABLET ORAL at 08:31

## 2020-10-05 RX ADMIN — HALOPERIDOL 7 MG: 5 TABLET ORAL at 08:31

## 2020-10-05 RX ADMIN — SALMETEROL XINAFOATE 1 PUFF: 50 POWDER, METERED ORAL; RESPIRATORY (INHALATION) at 08:30

## 2020-10-05 RX ADMIN — ATORVASTATIN CALCIUM 80 MG: 40 TABLET, FILM COATED ORAL at 22:51

## 2020-10-05 RX ADMIN — VENLAFAXINE HYDROCHLORIDE 75 MG: 75 CAPSULE, EXTENDED RELEASE ORAL at 08:31

## 2020-10-05 RX ADMIN — DOCUSATE SODIUM 100 MG: 100 CAPSULE, LIQUID FILLED ORAL at 08:31

## 2020-10-05 ASSESSMENT — ACTIVITIES OF DAILY LIVING (ADL)
ADLS_ACUITY_SCORE: 13

## 2020-10-05 NOTE — PLAN OF CARE
DATE & TIME: 10/5 7-3pm   Cognitive Concerns/ Orientation :    BEHAVIOR & AGGRESSION TOOL COLOR: Green but can escalate to yellow   ABNL VS/O2: VSS, checked daily  MOBILITY: Independent in room  PAIN MANAGMENT: None  DIET: Regular  BOWEL/BLADDER: Continent  DRAIN/DEVICES:  NA  SKIN:  WNL  TESTS/PROCEDURES:  NA  D/C DAY/GOALS/PLACE:  pending  OTHER IMPORTANT INFO:  Court hold, door alarm. Had shower today

## 2020-10-05 NOTE — PROGRESS NOTES
BRIEF NUTRITION REASSESSMENT      CURRENT DIET AND INTAKE:  Diet:  Regular  Pt continue to eat 100% per flow sheets.                ANTHROPOMETRICS:  Vitals:    09/09/20 1754 09/25/20 0632   Weight: 70 kg (154 lb 5.2 oz) 71 kg (156 lb 8 oz)       LABS:  Labs noted      NUTRITION INTERVENTION:  Nutrition Diagnosis:  No nutrition diagnosis at this time.    Implementation:  Nutrition Education:  Per Provider order if indicated    FOLLOW UP/MONITORING:   Will re-evaluate in 7 - 10 days, or sooner, if re-consulted.

## 2020-10-05 NOTE — PLAN OF CARE
DATE & TIME: 10/4/2020 4169-3321  Cognitive Concerns/ Orientation : A&O x 1, forgetful. Calm, cooperative and redirectable this shift.   BEHAVIOR & AGGRESSION TOOL COLOR: Green this shift; possibility of red,  can get agitated  ABNL VS/O2: no vitals done this shift- VS once per day  MOBILITY: independent in the room   PAIN MANAGMENT: denies   DIET: Regular; tolerating well, good appetite. On room service.  BOWEL/BLADDER: Continent  DRAIN/DEVICES: no PIV  SKIN: Scattered bruising; intact; nicotine patch on right arm  D/C DAY/GOALS/PLACE: pending placement, SW following for placement.   OTHER IMPORTANT INFO: Door alarm in place due to elopement risk; on court hold until 7/2021.

## 2020-10-05 NOTE — PROGRESS NOTES
[facility]    Medicine Progress Note - Hospitalist Service       Date of Admission:  9/9/2020  Assessment & Plan       John Juárez is a 54 year old male who was admitted on 9/9/2020 after aggressive behavior at group home, and left, and now they will not take him back. Pt is medically stable for discharge. Awaiting new placement. Appreciate social work assistance.      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  - psych re consulted intermittently, changed Zyprexa to Haldol 5mg BID and now increase to 7 mg bid, and added Cogentin 0.25mg BID (adjusted per Psych 9/29), periodic EKG to make sure QTc remain stable.   - Continue other chronic psych meds - Benztropine, memantine,venlafaxine  - Appreciate social work assistance with new placement   - patient is currently under civil commitment and court hold through [facility] until 7/31/21  - appreciate psych assistance     HLD  - Continue ASA and atorvastatin      COPD (chronic obstructive pulmonary disease)   - Continue salmeterol, not in acute exacerbation at this time.      Smoker - no interest in quitting   - nicotine patch      Hypothyroidism  - Continue Levothyroxine     Diet: Room Service  Combination Diet Safe Tray - NO utensils    DVT Prophylaxis: Pneumatic Compression Devices and Ambulate every shift  Valentin Catheter: not present  Code Status: Full Code           Disposition Plan   Expected discharge: pending placement - he is on court hold thru end of July 2021  Entered: Bora Walls DO 10/05/2020, 10:06 AM       The patient's care was discussed with the Bedside Nurse, Care Coordinator/ and Patient.    Bora Walls DO  Hospitalist Service  [facility]    ______________________________________________________________________    Interval History   Seen and examined this morning,  nothing new    Data reviewed today: I reviewed all medications, new labs and imaging results over the last 24 hours. I personally reviewed no images or EKG's today.    Physical Exam   Vital Signs: Temp: 97.8  F (36.6  C) Temp src: Oral BP: 101/72 Pulse: 67   Resp: 18 SpO2: 99 % O2 Device: None (Room air)    Weight: 156 lbs 8 oz    Gen: NAD, pleasant  HEENT: Normocephalic, EOMI, MMM  Resp: no crackles,  no wheezes, no increased work of resp  CV: S1S2 heard, reg rhythm, reg rate, no pedal edema  Abdo: soft, nontender, nondistended, bowel sounds present  Ext: calves nontender, well perfused  Neuro: AAOx3, CN grossly intact, no facial asymmetry        Data   NNL

## 2020-10-05 NOTE — PLAN OF CARE
Patient is confused. Daily VSS. Independent in room. Did not fully assess patient as he slept all night. Door alarm active.

## 2020-10-06 PROCEDURE — 120N000001 HC R&B MED SURG/OB

## 2020-10-06 PROCEDURE — 250N000013 HC RX MED GY IP 250 OP 250 PS 637: Performed by: HOSPITALIST

## 2020-10-06 PROCEDURE — 250N000013 HC RX MED GY IP 250 OP 250 PS 637: Performed by: PSYCHIATRY & NEUROLOGY

## 2020-10-06 PROCEDURE — 250N000013 HC RX MED GY IP 250 OP 250 PS 637: Performed by: INTERNAL MEDICINE

## 2020-10-06 PROCEDURE — 99232 SBSQ HOSP IP/OBS MODERATE 35: CPT | Performed by: INTERNAL MEDICINE

## 2020-10-06 RX ADMIN — DOCUSATE SODIUM 100 MG: 100 CAPSULE, LIQUID FILLED ORAL at 21:02

## 2020-10-06 RX ADMIN — ATORVASTATIN CALCIUM 80 MG: 40 TABLET, FILM COATED ORAL at 21:02

## 2020-10-06 RX ADMIN — HALOPERIDOL 7 MG: 5 TABLET ORAL at 08:58

## 2020-10-06 RX ADMIN — VENLAFAXINE HYDROCHLORIDE 75 MG: 75 CAPSULE, EXTENDED RELEASE ORAL at 08:58

## 2020-10-06 RX ADMIN — SALMETEROL XINAFOATE 1 PUFF: 50 POWDER, METERED ORAL; RESPIRATORY (INHALATION) at 09:07

## 2020-10-06 RX ADMIN — SALMETEROL XINAFOATE 1 PUFF: 50 POWDER, METERED ORAL; RESPIRATORY (INHALATION) at 21:01

## 2020-10-06 RX ADMIN — Medication 0.25 MG: at 08:57

## 2020-10-06 RX ADMIN — POLYETHYLENE GLYCOL 3350 17 G: 17 POWDER, FOR SOLUTION ORAL at 09:07

## 2020-10-06 RX ADMIN — LEVOTHYROXINE SODIUM 88 MCG: 88 TABLET ORAL at 08:57

## 2020-10-06 RX ADMIN — ASPIRIN 81 MG: 81 TABLET, COATED ORAL at 08:58

## 2020-10-06 RX ADMIN — HALOPERIDOL 7 MG: 5 TABLET ORAL at 21:01

## 2020-10-06 RX ADMIN — MEMANTINE 5 MG: 5 TABLET ORAL at 08:57

## 2020-10-06 RX ADMIN — DOCUSATE SODIUM 100 MG: 100 CAPSULE, LIQUID FILLED ORAL at 08:58

## 2020-10-06 RX ADMIN — Medication 0.25 MG: at 21:02

## 2020-10-06 ASSESSMENT — ACTIVITIES OF DAILY LIVING (ADL)
ADLS_ACUITY_SCORE: 13

## 2020-10-06 NOTE — PROGRESS NOTES
Pt AO.  VS stable, on once a day checks.  Slept throughout shift.  Did safety checks on patient.  Regular diet.  Full code.  Court hold, door alarm on.

## 2020-10-06 NOTE — PROGRESS NOTES
[facility]    Hospitalist Progress Note    Assessment & Plan   John Juárez is a 54 year old male with PMHx of schizophrenia, hx of TBI, alcohol use disorder, COPD, hyperlipidemia and hypothyroidism who was admitted on 9/9/2020 for evaluation of aggressive behavior at his group home. His medical condition remains stable, though his group home is now unwilling to take him back. Hospitalization has been prolonged while arranging for new placement.     Neurocognitive disorder dt hx of TBI and alcohol use disorder  Schizophrenia  Had been residing in group home prior to admission.  Brought to hospital for evaluation of aggressive behaviors. Group home now unwilling to take him back. Has had numerous CODE 21s called this stay. Seen by psych, meds adjusted. Is currently under civil commitment and court hold through [facility] until 7/31/21.  -- last seen by psych on 9/29  -- conts on Cogentin 0.25mg BID, Haldol 7mg BID, Namenda 5mg daily, Effexor XR 75mg daily  -- monitor periodic EKGs to ensure QTc remains stable    Hyperlipidemia  Chronic and stable on aspirin and statin    COPD  Not O2 dependent  Stable on salmeterol    Hypothyroidism  Chronic and stable on levothyroxine    Tobacco Use  Using nicotine patch    FEN: no IVFs, lytes stable, regular diet  DVT Prophylaxis: PCDs when in bed, encourage ambulation  Code Status: Full Code    Disposition: Discharge date unclear, pending placement. SW following.    Luba Gimenez    Interval History   Seen this afternoon. Resting comfortably. No specific complaints. Oriented to self and location, thinks the year is 2029.     -Data reviewed today: I reviewed all new labs and imaging results over the last 24 hours. I personally reviewed no images or EKG's today.    Physical Exam   Temp: 98.2  F (36.8  C) Temp src: Oral BP: 103/72 Pulse: 70   Resp: 16 SpO2: 99 % O2 Device: None (Room air)    Vitals:    09/09/20 1754 09/25/20 0632   Weight:  70 kg (154 lb 5.2 oz) 71 kg (156 lb 8 oz)     Vital Signs with Ranges  Temp:  [97.2  F (36.2  C)-98.2  F (36.8  C)] 98.2  F (36.8  C)  Pulse:  [70-76] 70  Resp:  [16] 16  BP: (100-103)/(67-72) 103/72  SpO2:  [98 %-99 %] 99 %  I/O last 3 completed shifts:  In: 1200 [P.O.:1200]  Out: -     Constitutional: Resting comfortably, alert but not overly conversive, oriented to self/location but thinks year is 2029, NAD  Respiratory: CTAB, no wheeze/rales/rhonchi, no increased work of breathing  Cardiovascular: HRRR, no MGR, no LE edema  GI: S, NT, ND, +BS  Skin/Integumen: warm/dry  Other: +resting tremor in UEs    Medications       aspirin  81 mg Oral Daily     atorvastatin  80 mg Oral At Bedtime     benztropine  0.25 mg Oral BID     docusate sodium  100 mg Oral BID     haloperidol  7 mg Oral BID     levothyroxine  88 mcg Oral Daily     memantine  5 mg Oral Daily     nicotine  1 patch Transdermal Daily     nicotine   Transdermal TID     polyethylene glycol  17 g Oral Daily     salmeterol  1 puff Inhalation BID     venlafaxine  75 mg Oral Daily with breakfast       Data   No lab results found in last 7 days.    No results found for this or any previous visit (from the past 24 hour(s)).

## 2020-10-06 NOTE — PLAN OF CARE
Cognitive Concerns/ Orientation :  Alert and disoriented to situation.  BEHAVIOR & AGGRESSION TOOL COLOR: Green but can escalate to yellow. Patient calm this shift.   ABNL VS/O2: VSS, checked daily  MOBILITY: Independent in room  PAIN MANAGMENT: None  DIET: Regular  BOWEL/BLADDER: Continent  DRAIN/DEVICES:  NA  SKIN:  WNL  TESTS/PROCEDURES:  NA  D/C DAY/GOALS/PLACE:  pending  OTHER IMPORTANT INFO:  Court hold, door alarm.

## 2020-10-06 NOTE — PLAN OF CARE
DATE & TIME: 10/6/2020 2543-3858   Cognitive Concerns/ Orientation : Alert and oriented x2, disoriented to situation and time.  BEHAVIOR & AGGRESSION TOOL COLOR: Green but can escalate to yellow. Patient calm this shift.  ABNL VS/O2: VSS, checked daily  MOBILITY: Independent in room  PAIN MANAGMENT: None  DIET: Regular  BOWEL/BLADDER: Continent  DRAIN/DEVICES:  N/A  SKIN: WDL  TESTS/PROCEDURES: NA  D/C DAY/GOALS/PLACE: pending placement  OTHER IMPORTANT INFO:  Court hold, door alarm. Declined nicotine patch, patch off.   MD/RN ROUNDING SIGNED OFF D/E SHIFT: yes  COMMIT TO SIT DONE AND SIGNED OFF yes

## 2020-10-06 NOTE — PROGRESS NOTES
Care Management Follow Up Note    Length of Stay (days) 17    Patient plan of care discussed at Interdisciplinary Rounds: yes  Expected Discharge Date: 10/05/20(ltc)  Concerns to be Addressed:  Call placed to Emily Haro, admission liaison for [facility] in [place].  Asked if she can offer patient placement.  She will review patient's records and call writer back.       Anticipated Discharge Disposition:    Anticipated Discharge Services:    Anticipated Discharge DME:      Plan:      Lulu Contreras, [facility]    Update at 1615. [facility] which is a residential home can offer patient a room.  The next step is for Emily, admission nurse at [facility] to present a funding request to patient's waiver .  Both estrella and Emily have left a message for his worker- Justin Beverly @ [phone].  Patient's wavier expires on 10/8 so we are trying to complete the arrangements in order for  patient to be back  in the community b7 10/8 /20. If he is not back in the community by 10/8, his wavier expires and we then need to re-apply thru the county.

## 2020-10-07 PROCEDURE — 250N000013 HC RX MED GY IP 250 OP 250 PS 637: Performed by: INTERNAL MEDICINE

## 2020-10-07 PROCEDURE — 250N000013 HC RX MED GY IP 250 OP 250 PS 637: Performed by: HOSPITALIST

## 2020-10-07 PROCEDURE — 250N000013 HC RX MED GY IP 250 OP 250 PS 637: Performed by: PSYCHIATRY & NEUROLOGY

## 2020-10-07 PROCEDURE — 120N000001 HC R&B MED SURG/OB

## 2020-10-07 PROCEDURE — 99231 SBSQ HOSP IP/OBS SF/LOW 25: CPT | Performed by: INTERNAL MEDICINE

## 2020-10-07 RX ADMIN — SALMETEROL XINAFOATE 1 PUFF: 50 POWDER, METERED ORAL; RESPIRATORY (INHALATION) at 09:20

## 2020-10-07 RX ADMIN — ASPIRIN 81 MG: 81 TABLET, COATED ORAL at 09:16

## 2020-10-07 RX ADMIN — Medication 0.25 MG: at 20:37

## 2020-10-07 RX ADMIN — POLYETHYLENE GLYCOL 3350 17 G: 17 POWDER, FOR SOLUTION ORAL at 09:17

## 2020-10-07 RX ADMIN — ATORVASTATIN CALCIUM 80 MG: 40 TABLET, FILM COATED ORAL at 20:37

## 2020-10-07 RX ADMIN — HALOPERIDOL 7 MG: 5 TABLET ORAL at 09:16

## 2020-10-07 RX ADMIN — LEVOTHYROXINE SODIUM 88 MCG: 88 TABLET ORAL at 09:15

## 2020-10-07 RX ADMIN — SALMETEROL XINAFOATE 1 PUFF: 50 POWDER, METERED ORAL; RESPIRATORY (INHALATION) at 20:41

## 2020-10-07 RX ADMIN — MEMANTINE 5 MG: 5 TABLET ORAL at 09:15

## 2020-10-07 RX ADMIN — HALOPERIDOL 7 MG: 5 TABLET ORAL at 20:37

## 2020-10-07 RX ADMIN — DOCUSATE SODIUM 100 MG: 100 CAPSULE, LIQUID FILLED ORAL at 09:16

## 2020-10-07 RX ADMIN — DOCUSATE SODIUM 100 MG: 100 CAPSULE, LIQUID FILLED ORAL at 20:37

## 2020-10-07 RX ADMIN — VENLAFAXINE HYDROCHLORIDE 75 MG: 75 CAPSULE, EXTENDED RELEASE ORAL at 09:15

## 2020-10-07 RX ADMIN — Medication 0.25 MG: at 09:15

## 2020-10-07 ASSESSMENT — ACTIVITIES OF DAILY LIVING (ADL)
ADLS_ACUITY_SCORE: 13

## 2020-10-07 NOTE — PLAN OF CARE
DATE & TIME: 10/6/2020 6128-1429  Cognitive Concerns/ Orientation : Alert and oriented x2, disoriented to situation and time.  BEHAVIOR & AGGRESSION TOOL COLOR: Green but can escalate to yellow. Patient calm this shift  ABNL VS/O2: VSS on room air, checked daily.   MOBILITY: Independent in room  PAIN MANAGMENT: None  DIET: Regular  BOWEL/BLADDER: Continent  DRAIN/DEVICES:  N/A  SKIN: WDL  TESTS/PROCEDURES: NA  D/C DAY/GOALS/PLACE: pending placement  OTHER IMPORTANT INFO:  Court hold, door alarm. Declined nicotine patch this AM, patch off.

## 2020-10-07 NOTE — PLAN OF CARE
DATE & TIME: 10/6/2020 9607-4764  Cognitive Concerns/ Orientation : Alert and oriented x2, disoriented to situation and time.  BEHAVIOR & AGGRESSION TOOL COLOR: Green but can escalate to yellow. Patient agitated at start of shift, calm later on.   ABNL VS/O2: VSS on room air, checked daily.   MOBILITY: Independent in room  PAIN MANAGMENT: None  DIET: Regular  BOWEL/BLADDER: Continent  DRAIN/DEVICES:  N/A  SKIN: WDL  TESTS/PROCEDURES: NA  D/C DAY/GOALS/PLACE: pending placement  OTHER IMPORTANT INFO:  Court hold, door alarm. Declined nicotine patch this AM, patch off.

## 2020-10-07 NOTE — PLAN OF CARE
DATE & TIME: 10/7/2020 0148-2259  Cognitive Concerns/ Orientation : Alert and oriented x2, disoriented to situation and time.  BEHAVIOR & AGGRESSION TOOL COLOR: Green but can escalate to yellow. Patient calm this shift  ABNL VS/O2: VSS on room air, checked daily.   MOBILITY: Independent in room  PAIN MANAGMENT: None  DIET: Regular  BOWEL/BLADDER: Continent  DRAIN/DEVICES:  N/A  SKIN: WDL  TESTS/PROCEDURES: NA  D/C DAY/GOALS/PLACE: pending placement  OTHER IMPORTANT INFO:  Court hold, door alarm. Declined nicotine patch this AM, patch off.     MD/RN ROUNDING SIGNED OFF D/E SHIFT: yes  COMMIT TO SIT DONE AND SIGNED OFF yes

## 2020-10-07 NOTE — PROGRESS NOTES
[facility]    Hospitalist Progress Note    Assessment & Plan   John Juárez is a 54 year old male with PMHx of schizophrenia, hx of TBI, alcohol use disorder, COPD, hyperlipidemia and hypothyroidism who was admitted on 9/9/2020 for evaluation of aggressive behavior at his group home. His medical condition remains stable, though his group home is now unwilling to take him back. Hospitalization has been prolonged while arranging for new placement.     Neurocognitive disorder dt hx of TBI and alcohol use disorder  Schizophrenia  Had been residing in group home prior to admission.  Brought to hospital for evaluation of aggressive behaviors. Group home now unwilling to take him back. Has had numerous CODE 21s called this stay. Seen by psych, meds adjusted. Is currently under civil commitment and court hold through [facility] until 7/31/21.  -- last seen by psych on 9/29  -- conts on Cogentin 0.25mg BID, Haldol 7mg BID, Namenda 5mg daily, Effexor XR 75mg daily  -- monitor periodic EKGs to ensure QTc remains stable    Hyperlipidemia  Chronic and stable on aspirin and statin    COPD  Not O2 dependent  Stable on salmeterol    Hypothyroidism  Chronic and stable on levothyroxine    Tobacco Use  Using nicotine patch    FEN: no IVFs, lytes stable, regular diet  DVT Prophylaxis: PCDs when in bed, encourage ambulation  Code Status: Full Code    Disposition: Discharge date unclear, pending placement. SW following.    Luba Gimenez    Interval History   Seen this afternoon. Resting comfortably. No complaints. No concerns per bedside RN.    -Data reviewed today: I reviewed all new labs and imaging results over the last 24 hours. I personally reviewed no images or EKG's today.    Physical Exam   Temp: 97.7  F (36.5  C) Temp src: Oral BP: 96/66 Pulse: 78   Resp: 16 SpO2: 97 % O2 Device: None (Room air)    Vitals:    09/09/20 1754 09/25/20 0632   Weight: 70 kg (154 lb 5.2 oz) 71 kg (156 lb 8  oz)     Vital Signs with Ranges  Temp:  [97.7  F (36.5  C)-97.9  F (36.6  C)] 97.7  F (36.5  C)  Pulse:  [65-78] 78  Resp:  [16] 16  BP: ()/(66-87) 96/66  SpO2:  [97 %-99 %] 97 %  No intake/output data recorded.    Constitutional: Resting comfortably, alert but not overly conversive, NAD  Respiratory: CTAB, no wheeze/rales/rhonchi, no increased work of breathing  Cardiovascular: HRRR, no MGR, no LE edema  GI: S, NT, ND, +BS  Skin/Integumen: warm/dry  Other: +resting tremor in UEs    Medications       aspirin  81 mg Oral Daily     atorvastatin  80 mg Oral At Bedtime     benztropine  0.25 mg Oral BID     docusate sodium  100 mg Oral BID     haloperidol  7 mg Oral BID     levothyroxine  88 mcg Oral Daily     memantine  5 mg Oral Daily     nicotine  1 patch Transdermal Daily     nicotine   Transdermal TID     polyethylene glycol  17 g Oral Daily     salmeterol  1 puff Inhalation BID     venlafaxine  75 mg Oral Daily with breakfast       Data   No lab results found in last 7 days.    No results found for this or any previous visit (from the past 24 hour(s)).

## 2020-10-08 PROCEDURE — 250N000013 HC RX MED GY IP 250 OP 250 PS 637: Performed by: PSYCHIATRY & NEUROLOGY

## 2020-10-08 PROCEDURE — 250N000013 HC RX MED GY IP 250 OP 250 PS 637: Performed by: HOSPITALIST

## 2020-10-08 PROCEDURE — 250N000013 HC RX MED GY IP 250 OP 250 PS 637: Performed by: INTERNAL MEDICINE

## 2020-10-08 PROCEDURE — 120N000001 HC R&B MED SURG/OB

## 2020-10-08 PROCEDURE — 99231 SBSQ HOSP IP/OBS SF/LOW 25: CPT | Performed by: INTERNAL MEDICINE

## 2020-10-08 RX ADMIN — Medication 0.25 MG: at 08:15

## 2020-10-08 RX ADMIN — DOCUSATE SODIUM 100 MG: 100 CAPSULE, LIQUID FILLED ORAL at 08:15

## 2020-10-08 RX ADMIN — DOCUSATE SODIUM 100 MG: 100 CAPSULE, LIQUID FILLED ORAL at 20:46

## 2020-10-08 RX ADMIN — VENLAFAXINE HYDROCHLORIDE 75 MG: 75 CAPSULE, EXTENDED RELEASE ORAL at 08:15

## 2020-10-08 RX ADMIN — SALMETEROL XINAFOATE 1 PUFF: 50 POWDER, METERED ORAL; RESPIRATORY (INHALATION) at 20:47

## 2020-10-08 RX ADMIN — HALOPERIDOL 7 MG: 5 TABLET ORAL at 08:15

## 2020-10-08 RX ADMIN — Medication 0.25 MG: at 20:46

## 2020-10-08 RX ADMIN — ATORVASTATIN CALCIUM 80 MG: 40 TABLET, FILM COATED ORAL at 20:46

## 2020-10-08 RX ADMIN — POLYETHYLENE GLYCOL 3350 17 G: 17 POWDER, FOR SOLUTION ORAL at 08:16

## 2020-10-08 RX ADMIN — SALMETEROL XINAFOATE 1 PUFF: 50 POWDER, METERED ORAL; RESPIRATORY (INHALATION) at 08:18

## 2020-10-08 RX ADMIN — MEMANTINE 5 MG: 5 TABLET ORAL at 08:15

## 2020-10-08 RX ADMIN — ASPIRIN 81 MG: 81 TABLET, COATED ORAL at 08:15

## 2020-10-08 RX ADMIN — LEVOTHYROXINE SODIUM 88 MCG: 88 TABLET ORAL at 08:15

## 2020-10-08 RX ADMIN — HALOPERIDOL 7 MG: 5 TABLET ORAL at 20:46

## 2020-10-08 ASSESSMENT — ACTIVITIES OF DAILY LIVING (ADL)
ADLS_ACUITY_SCORE: 13

## 2020-10-08 NOTE — PLAN OF CARE
DATE & TIME: 10/8/2020 8724-9365  Cognitive Concerns/ Orientation : Alert and oriented x3, disoriented to situation.   BEHAVIOR & AGGRESSION TOOL COLOR: Green but can escalate to yellow. Patient calm this shift  ABNL VS/O2: VSS on room air, checked daily.   MOBILITY: Independent in room  PAIN MANAGMENT: None  DIET: Regular  BOWEL/BLADDER: Continent  DRAIN/DEVICES:  N/A  SKIN: WDL  TESTS/PROCEDURES: NA  D/C DAY/GOALS/PLACE: pending placement  OTHER IMPORTANT INFO:  Court hold, door alarm. Declined nicotine patch this AM, patch off.     MD/RN ROUNDING SIGNED OFF D/E SHIFT: yes  COMMIT TO SIT DONE AND SIGNED OFF yes

## 2020-10-08 NOTE — PLAN OF CARE
3510-8077: Patient on court hold, door alarm in place. Slept most of shift. Up independently in room. Calm and cooperative this shift. Discharge pending placement.

## 2020-10-08 NOTE — PLAN OF CARE
DATE & TIME: 10/7/2020 3-11pm shift  Cognitive Concerns/ Orientation : Alert and oriented x2, disoriented to situation and time. Pleasant and calm this shift.  BEHAVIOR & AGGRESSION TOOL COLOR: Green but can escalate to yellow. Patient calm this shift  ABNL VS/O2: VSS on room air, checked daily.   MOBILITY: Independent in room  PAIN MANAGMENT: None  DIET: Regular  BOWEL/BLADDER: Continent  DRAIN/DEVICES:  N/A  SKIN: WDL  TESTS/PROCEDURES: NA  D/C DAY/GOALS/PLACE: pending placement  OTHER IMPORTANT INFO:  Court hold, door alarm. Declined nicotine patch this AM, patch off.     MD/RN ROUNDING SIGNED OFF D/E SHIFT: yes  COMMIT TO SIT DONE AND SIGNED OFF yes

## 2020-10-08 NOTE — PROGRESS NOTES
Care Management Follow Up Note    Length of Stay (days) 19    Patient plan of care discussed at Interdisciplinary Rounds: yes  Expected Discharge Date: 10/05/20([facility])  Concerns to be Addressed:     Called patient's wajs  again. Justin Paul at [phone].   The agency staff is working remotely so the only option is to leave a message.  Writer contacted JAAD Diaz, for [facility] which also was trying to reach Justin Paul and she hasn't received a call back either.  Writer called patient's commitment , Gina at [phone] for assistance however she is out of the office and will return on Monday.  Patient's wavier  today because he has been out of the community for 30 days.   Anticipated Discharge Disposition:    Anticipated Discharge Services:    Anticipated Discharge DME:      Plan:  Tomorrow writer will call [facility] to ask for assistance. Will determine if writer can re-apply for a Wavier given it is the hospital understanding the patient's wavier  today      ASIM CastilloSW

## 2020-10-08 NOTE — PROGRESS NOTES
[facility]    Hospitalist Progress Note    Assessment & Plan   John Juárez is a 54 year old male with PMHx of schizophrenia, hx of TBI, alcohol use disorder, COPD, hyperlipidemia and hypothyroidism who was admitted on 9/9/2020 for evaluation of aggressive behavior at his group home. His medical condition remains stable, though his group home is now unwilling to take him back. Hospitalization has been prolonged while arranging for new placement.     Neurocognitive disorder dt hx of TBI and alcohol use disorder  Schizophrenia  Had been residing in group home prior to admission.  Brought to hospital for evaluation of aggressive behaviors. Group home now unwilling to take him back. Has had numerous CODE 21s called this stay. Seen by psych, meds adjusted. Is currently under civil commitment and court hold through [facility] until 7/31/21.  -- last seen by psych on 9/29  -- conts on Cogentin 0.25mg BID, Haldol 7mg BID, Namenda 5mg daily, Effexor XR 75mg daily  -- monitor periodic EKGs to ensure QTc remains stable    Hyperlipidemia  Chronic and stable on aspirin and statin    COPD  Not O2 dependent.  Stable on salmeterol    Hypothyroidism  Chronic and stable on levothyroxine    Tobacco Use  Using nicotine patch    FEN: no IVFs, lytes stable, regular diet  DVT Prophylaxis: PCDs when in bed, encourage ambulation  Code Status: Full Code    Disposition: Discharge date unclear, pending placement. SW following.    Luba Gimenez    Interval History    Uneventful night. Seen this morning. No specific complaints. No concerns per bedside RN.    -Data reviewed today: I reviewed all new labs and imaging results over the last 24 hours. I personally reviewed no images or EKG's today.    Physical Exam   Temp: 97.4  F (36.3  C) Temp src: Oral BP: 99/66 Pulse: 72   Resp: 16 SpO2: 98 % O2 Device: None (Room air)    Vitals:    09/09/20 1754 09/25/20 0632   Weight: 70 kg (154 lb 5.2 oz) 71 kg (156  lb 8 oz)     Vital Signs with Ranges  Temp:  [97.4  F (36.3  C)] 97.4  F (36.3  C)  Pulse:  [72] 72  Resp:  [16] 16  BP: (99)/(66) 99/66  SpO2:  [98 %] 98 %  No intake/output data recorded.    Constitutional: Resting comfortably, alert but not overly conversive, oriented to self/location, NAD  Respiratory: CTAB, no wheeze/rales/rhonchi, no increased work of breathing  Cardiovascular: HRRR, soft SM most prominent along sternal border, no LE edema  GI: S, NT, ND, +BS  Skin/Integumen: warm/dry  Other:    Medications       aspirin  81 mg Oral Daily     atorvastatin  80 mg Oral At Bedtime     benztropine  0.25 mg Oral BID     docusate sodium  100 mg Oral BID     haloperidol  7 mg Oral BID     levothyroxine  88 mcg Oral Daily     memantine  5 mg Oral Daily     nicotine  1 patch Transdermal Daily     nicotine   Transdermal TID     polyethylene glycol  17 g Oral Daily     salmeterol  1 puff Inhalation BID     venlafaxine  75 mg Oral Daily with breakfast       Data   No lab results found in last 7 days.    No results found for this or any previous visit (from the past 24 hour(s)).

## 2020-10-09 PROCEDURE — 250N000013 HC RX MED GY IP 250 OP 250 PS 637: Performed by: HOSPITALIST

## 2020-10-09 PROCEDURE — 99231 SBSQ HOSP IP/OBS SF/LOW 25: CPT | Performed by: INTERNAL MEDICINE

## 2020-10-09 PROCEDURE — 250N000013 HC RX MED GY IP 250 OP 250 PS 637: Performed by: PSYCHIATRY & NEUROLOGY

## 2020-10-09 PROCEDURE — 250N000013 HC RX MED GY IP 250 OP 250 PS 637: Performed by: INTERNAL MEDICINE

## 2020-10-09 PROCEDURE — 120N000001 HC R&B MED SURG/OB

## 2020-10-09 RX ADMIN — HALOPERIDOL 7 MG: 5 TABLET ORAL at 08:36

## 2020-10-09 RX ADMIN — ATORVASTATIN CALCIUM 80 MG: 40 TABLET, FILM COATED ORAL at 21:37

## 2020-10-09 RX ADMIN — LEVOTHYROXINE SODIUM 88 MCG: 88 TABLET ORAL at 08:36

## 2020-10-09 RX ADMIN — SALMETEROL XINAFOATE 1 PUFF: 50 POWDER, METERED ORAL; RESPIRATORY (INHALATION) at 21:37

## 2020-10-09 RX ADMIN — MEMANTINE 5 MG: 5 TABLET ORAL at 08:35

## 2020-10-09 RX ADMIN — DOCUSATE SODIUM 100 MG: 100 CAPSULE, LIQUID FILLED ORAL at 08:35

## 2020-10-09 RX ADMIN — SALMETEROL XINAFOATE 1 PUFF: 50 POWDER, METERED ORAL; RESPIRATORY (INHALATION) at 08:39

## 2020-10-09 RX ADMIN — HALOPERIDOL 7 MG: 5 TABLET ORAL at 21:37

## 2020-10-09 RX ADMIN — ASPIRIN 81 MG: 81 TABLET, COATED ORAL at 08:35

## 2020-10-09 RX ADMIN — VENLAFAXINE HYDROCHLORIDE 75 MG: 75 CAPSULE, EXTENDED RELEASE ORAL at 08:35

## 2020-10-09 RX ADMIN — DOCUSATE SODIUM 100 MG: 100 CAPSULE, LIQUID FILLED ORAL at 21:37

## 2020-10-09 RX ADMIN — Medication 0.25 MG: at 08:36

## 2020-10-09 RX ADMIN — POLYETHYLENE GLYCOL 3350 17 G: 17 POWDER, FOR SOLUTION ORAL at 08:36

## 2020-10-09 RX ADMIN — Medication 0.25 MG: at 21:37

## 2020-10-09 ASSESSMENT — ACTIVITIES OF DAILY LIVING (ADL)
ADLS_ACUITY_SCORE: 13

## 2020-10-09 NOTE — PLAN OF CARE
DATE & TIME: 10/8/2020 3-11pm  Cognitive Concerns/ Orientation : Alert and oriented x3, disoriented to situation.   BEHAVIOR & AGGRESSION TOOL COLOR: Green but can escalate to yellow. Patient calm this shift  ABNL VS/O2: VSS on room air, checked daily.   MOBILITY: Independent in room  PAIN MANAGMENT: None  DIET: Regular  BOWEL/BLADDER: Continent  DRAIN/DEVICES:  N/A  SKIN: WDL  TESTS/PROCEDURES: NA  D/C DAY/GOALS/PLACE: pending placement  OTHER IMPORTANT INFO:  Court hold, door alarm. Declined nicotine patch this Pm     MD/RN ROUNDING SIGNED OFF D/E SHIFT: no  COMMIT TO SIT DONE AND SIGNED OFF yes

## 2020-10-09 NOTE — PROGRESS NOTES
[facility]    Hospitalist Progress Note    Assessment & Plan   John Juárez is a 54 year old male with PMHx of schizophrenia, hx of TBI, alcohol use disorder, COPD, hyperlipidemia and hypothyroidism who was admitted on 9/9/2020 for evaluation of aggressive behavior at his group home. His medical condition remains stable, though his group home is now unwilling to take him back. Hospitalization has been prolonged while arranging for new placement.     Neurocognitive disorder dt hx of TBI and alcohol use disorder  Schizophrenia  Had been residing in group home prior to admission.  Brought to hospital for evaluation of aggressive behaviors. Group home now unwilling to take him back. Has had numerous CODE 21s called this stay. Seen by psych, meds adjusted. Is currently under civil commitment and court hold through [facility] until 7/31/21.  -- last seen by psych on 9/29  -- conts on Cogentin 0.25mg BID, Haldol 7mg BID, Namenda 5mg daily, Effexor XR 75mg daily  -- monitor periodic EKGs to ensure QTc remains stable    Hyperlipidemia  Chronic and stable on aspirin and statin    COPD  Not O2 dependent.  Stable on salmeterol    Hypothyroidism  Chronic and stable on levothyroxine    Tobacco Use  Using nicotine patch    FEN: no IVFs, lytes stable, regular diet  DVT Prophylaxis: PCDs when in bed, encourage ambulation  Code Status: Full Code    Disposition: Discharge date unclear, pending placement. SW following.    Luba Gimenez    Interval History    Uneventful night. Seen this morning. Resting comfortably. No specific complaints.     -Data reviewed today: I reviewed all new labs and imaging results over the last 24 hours. I personally reviewed no images or EKG's today.    Physical Exam   Temp: 98  F (36.7  C) Temp src: Oral BP: 100/67 Pulse: 72   Resp: 16 SpO2: 99 % O2 Device: None (Room air)    Vitals:    09/09/20 1754 09/25/20 0632   Weight: 70 kg (154 lb 5.2 oz) 71 kg (156 lb 8  oz)     Vital Signs with Ranges  Temp:  [97.3  F (36.3  C)-98  F (36.7  C)] 98  F (36.7  C)  Pulse:  [62-77] 72  Resp:  [14-16] 16  BP: ()/(44-85) 100/67  SpO2:  [98 %-99 %] 99 %  I/O last 3 completed shifts:  In: 540 [P.O.:540]  Out: -     Constitutional: Resting comfortably, alert but not overly conversive, oriented to self/location, NAD  Respiratory: CTAB, no wheeze/rales/rhonchi, no increased work of breathing  Cardiovascular: HRRR, soft SM most prominent along sternal border, no LE edema  GI: S, NT, ND, +BS  Skin/Integumen: warm/dry  Other:    Medications       aspirin  81 mg Oral Daily     atorvastatin  80 mg Oral At Bedtime     benztropine  0.25 mg Oral BID     docusate sodium  100 mg Oral BID     haloperidol  7 mg Oral BID     levothyroxine  88 mcg Oral Daily     memantine  5 mg Oral Daily     nicotine  1 patch Transdermal Daily     nicotine   Transdermal TID     polyethylene glycol  17 g Oral Daily     salmeterol  1 puff Inhalation BID     venlafaxine  75 mg Oral Daily with breakfast       Data   No lab results found in last 7 days.    No results found for this or any previous visit (from the past 24 hour(s)).

## 2020-10-09 NOTE — PLAN OF CARE
DATE & TIME: 10/8/2020 7668-2240    Cognitive Concerns/ Orientation : A & O x 3, DO to situation   BEHAVIOR & AGGRESSION TOOL COLOR: Green  CIWA SCORE: NA   ABNL VS/O2: VSS on RA  MOBILITY: Independent  PAIN MANAGMENT: Denies  DIET: Regular  BOWEL/BLADDER: Continent of B & B  ABNL LAB/BG: NA  DRAIN/DEVICES: No IV access, MD aware  TELEMETRY RHYTHM: NA  SKIN: Bruising, intact.   TESTS/PROCEDURES: NA  D/C DAY/GOALS/PLACE: Pending placement  OTHER IMPORTANT INFO: Court hold, door alarm.   MD/RN ROUNDING SIGNED OFF D/E SHIFT: NA  COMMIT TO SIT DONE AND SIGNED OFF Completed

## 2020-10-09 NOTE — PLAN OF CARE
DATE & TIME: 10/9/2020 7a-3pm   Cognitive Concerns/ Orientation : A & O x 3, DO to situation   BEHAVIOR & AGGRESSION TOOL COLOR: Green, hx yellow  CIWA SCORE: NA   ABNL VS/O2: VSS on RA  MOBILITY: Independent  PAIN MANAGMENT: Denies  DIET: Regular  BOWEL/BLADDER: Continent of B & B  ABNL LAB/BG: NA  DRAIN/DEVICES: No IV access, MD aware  TELEMETRY RHYTHM: NA  SKIN: Bruising, intact.   TESTS/PROCEDURES: NA  D/C DAY/GOALS/PLACE: Pending placement. No changes today  OTHER IMPORTANT INFO: Court hold, door alarm.   MD/RN ROUNDING SIGNED OFF D/E SHIFT: NA  COMMIT TO SIT DONE AND SIGNED OFF Completed

## 2020-10-10 PROCEDURE — 99232 SBSQ HOSP IP/OBS MODERATE 35: CPT | Performed by: HOSPITALIST

## 2020-10-10 PROCEDURE — 250N000013 HC RX MED GY IP 250 OP 250 PS 637: Performed by: NURSE PRACTITIONER

## 2020-10-10 PROCEDURE — 250N000013 HC RX MED GY IP 250 OP 250 PS 637: Performed by: PSYCHIATRY & NEUROLOGY

## 2020-10-10 PROCEDURE — 250N000013 HC RX MED GY IP 250 OP 250 PS 637: Performed by: INTERNAL MEDICINE

## 2020-10-10 PROCEDURE — 120N000001 HC R&B MED SURG/OB

## 2020-10-10 PROCEDURE — 250N000013 HC RX MED GY IP 250 OP 250 PS 637: Performed by: HOSPITALIST

## 2020-10-10 RX ADMIN — ATORVASTATIN CALCIUM 80 MG: 40 TABLET, FILM COATED ORAL at 21:31

## 2020-10-10 RX ADMIN — VENLAFAXINE HYDROCHLORIDE 75 MG: 75 CAPSULE, EXTENDED RELEASE ORAL at 08:07

## 2020-10-10 RX ADMIN — LEVOTHYROXINE SODIUM 88 MCG: 88 TABLET ORAL at 08:06

## 2020-10-10 RX ADMIN — NICOTINE 1 PATCH: 21 PATCH, EXTENDED RELEASE TRANSDERMAL at 08:07

## 2020-10-10 RX ADMIN — ASPIRIN 81 MG: 81 TABLET, COATED ORAL at 08:05

## 2020-10-10 RX ADMIN — SALMETEROL XINAFOATE 1 PUFF: 50 POWDER, METERED ORAL; RESPIRATORY (INHALATION) at 08:10

## 2020-10-10 RX ADMIN — SALMETEROL XINAFOATE 1 PUFF: 50 POWDER, METERED ORAL; RESPIRATORY (INHALATION) at 21:32

## 2020-10-10 RX ADMIN — HALOPERIDOL 7 MG: 5 TABLET ORAL at 08:06

## 2020-10-10 RX ADMIN — MEMANTINE 5 MG: 5 TABLET ORAL at 08:07

## 2020-10-10 RX ADMIN — DOCUSATE SODIUM 100 MG: 100 CAPSULE, LIQUID FILLED ORAL at 08:06

## 2020-10-10 RX ADMIN — HALOPERIDOL 7 MG: 5 TABLET ORAL at 21:32

## 2020-10-10 RX ADMIN — POLYETHYLENE GLYCOL 3350 17 G: 17 POWDER, FOR SOLUTION ORAL at 08:07

## 2020-10-10 RX ADMIN — Medication 0.25 MG: at 08:06

## 2020-10-10 RX ADMIN — DOCUSATE SODIUM 100 MG: 100 CAPSULE, LIQUID FILLED ORAL at 21:32

## 2020-10-10 RX ADMIN — Medication 0.25 MG: at 21:31

## 2020-10-10 ASSESSMENT — ACTIVITIES OF DAILY LIVING (ADL)
ADLS_ACUITY_SCORE: 13

## 2020-10-10 NOTE — PLAN OF CARE
DATE & TIME: 10/10/2020 1283-2032  Cognitive Concerns/ Orientation : A&O x 3, disoriented to situation.   BEHAVIOR & AGGRESSION TOOL COLOR: Green  CIWA SCORE: N/A   ABNL VS/O2: Daily VS, VSS on RA  MOBILITY: Independent in the room  PAIN MANAGMENT: Denied  DIET: Regular, no utensils. Dinner ordered.    BOWEL/BLADDER: Continent to B/B  ABNL LAB/BG: No labs since 9/28  DRAIN/DEVICES: No IV access  TELEMETRY RHYTHM: N/A  SKIN: Bruised,otherwise intact  TESTS/PROCEDURES: N/A  D/C DAY/GOALS/PLACE: Discharge pending for placement.  OTHER IMPORTANT INFO: On court hold, SW following.  MD/RN ROUNDING SIGNED OFF D/E SHIFT: Completed  COMMIT TO SIT DONE AND SIGNED OFF Completed

## 2020-10-10 NOTE — PROGRESS NOTES
SW:  D:  DR Gimenez spoke with patient's sister Freida for medical update.  Freida's primary questions relate to patient's discharge plan.  Writer was unable to call Freida on Friday, will call today.  No further discharge planning is occurring this weekend.  On Monday, writer will be calling [facility] to start the process to re-apply for [employer] funding which is needed in order for patient to move into a group home setting.  Currently [facility] is accepting patient once funding is in place.

## 2020-10-10 NOTE — PLAN OF CARE
DATE & TIME: 10/9 from 2300 to 0730    Cognitive Concerns/ Orientation : A&O x 2, disoriented to situation and time   BEHAVIOR & AGGRESSION TOOL COLOR: Green most of the shift   CIWA SCORE: N/A   ABNL VS/O2: Daily Vitals  MOBILITY: Up and independent in the room  PAIN MANAGMENT: Denied  DIET: Regular   BOWEL/BLADDER: Continent to B/B  ABNL LAB/BG: No labs since 9/28  DRAIN/DEVICES: No IV access, MD is aware  TELEMETRY RHYTHM: N/A  SKIN: Bruised,otherwise intact  TESTS/PROCEDURES: N/A  D/C DAY/GOALS/PLACE: Discharge pending for placement  OTHER IMPORTANT INFO: on court hold, SW following  MD/RN ROUNDING SIGNED OFF D/E SHIFT: N/A  COMMIT TO SIT DONE AND SIGNED OFF Yes

## 2020-10-10 NOTE — PLAN OF CARE
"DATE & TIME: 10/9/2020 6107-7703    Cognitive Concerns/ Orientation : A&O2, disoriented to situation and time.    BEHAVIOR & AGGRESSION TOOL COLOR: green most of shift/yellow at end of shift. Called 911 around 2145 but RN was able to redirect pt to take HS meds; pt reported that \"the deputy is outside, they know you're keeping me against my will\". PRN zyprexa and haldol available if needed.  ABNL VS/O2: VS once daily, stable on RA  MOBILITY: independent in room  PAIN MANAGMENT: denies pain  DIET: regular diet, tolerating well. Denies nausea  BOWEL/BLADDER: continent   ABNL LAB/BG: no new labs since 9/28  DRAIN/DEVICES: no IV access, MD aware  D/C DAY/GOALS/PLACE: needs placement       "

## 2020-10-10 NOTE — PROGRESS NOTES
[facility]    Hospitalist Progress Note    Assessment & Plan   John Juárez is a 54 year old male with PMHx of schizophrenia, hx of TBI, alcohol use disorder, COPD, hyperlipidemia and hypothyroidism who was admitted on 9/9/2020 for evaluation of aggressive behavior at his group home. His medical condition remains stable, though his group home is now unwilling to take him back. Hospitalization has been prolonged while arranging for new placement.     Neurocognitive disorder dt hx of TBI and alcohol use disorder  Schizophrenia  Had been residing in group home prior to admission.  Brought to hospital for evaluation of aggressive behaviors. Group home now unwilling to take him back. Has had numerous CODE 21s called this stay. Seen by psych, meds adjusted. Is currently under civil commitment and court hold through [facility] until 7/31/21.  -- last seen by psych on 9/29  -- conts on Cogentin 0.25mg BID, Haldol 7mg BID, Namenda 5mg daily, Effexor XR 75mg daily  -- monitor periodic EKGs to ensure QTc remains stable    Hyperlipidemia  Chronic and stable on aspirin and statin    COPD  Not O2 dependent.  Stable on salmeterol    Hypothyroidism  Chronic and stable on levothyroxine    Tobacco Use  Using nicotine patch    FEN: no IVFs, lytes stable, regular diet  DVT Prophylaxis: PCDs when in bed, encourage ambulation  Code Status: Full Code    Disposition: Discharge date unclear, pending placement. SW following.    Bora Walls    Interval History    Uneventful night. Seen this morning. Resting comfortably. No specific complaints.     -Data reviewed today: I reviewed all new labs and imaging results over the last 24 hours. I personally reviewed no images or EKG's today.    Physical Exam   Temp: 98.7  F (37.1  C) Temp src: Oral BP: 99/66 Pulse: 70   Resp: 18 SpO2: 96 % O2 Device: None (Room air)    Vitals:    09/09/20 1754 09/25/20 0632   Weight: 70 kg (154 lb 5.2 oz) 71 kg (156 lb 8 oz)      Vital Signs with Ranges  Temp:  [98.7  F (37.1  C)] 98.7  F (37.1  C)  Pulse:  [70] 70  Resp:  [18] 18  BP: (99)/(66) 99/66  SpO2:  [96 %] 96 %  No intake/output data recorded.    Constitutional: Resting comfortably, alert but not overly conversive, oriented to self/location, NAD  Respiratory: CTAB, no wheeze/rales/rhonchi, no increased work of breathing  Cardiovascular: HRRR, soft SM most prominent along sternal border, no LE edema  GI: S, NT, ND, +BS  Skin/Integumen: warm/dry  Other:    Medications       aspirin  81 mg Oral Daily     atorvastatin  80 mg Oral At Bedtime     benztropine  0.25 mg Oral BID     docusate sodium  100 mg Oral BID     haloperidol  7 mg Oral BID     levothyroxine  88 mcg Oral Daily     memantine  5 mg Oral Daily     nicotine  1 patch Transdermal Daily     nicotine   Transdermal TID     polyethylene glycol  17 g Oral Daily     salmeterol  1 puff Inhalation BID     venlafaxine  75 mg Oral Daily with breakfast       Data   No lab results found in last 7 days.    No results found for this or any previous visit (from the past 24 hour(s)).

## 2020-10-11 PROCEDURE — 250N000013 HC RX MED GY IP 250 OP 250 PS 637: Performed by: INTERNAL MEDICINE

## 2020-10-11 PROCEDURE — 250N000013 HC RX MED GY IP 250 OP 250 PS 637: Performed by: PSYCHIATRY & NEUROLOGY

## 2020-10-11 PROCEDURE — 99231 SBSQ HOSP IP/OBS SF/LOW 25: CPT | Performed by: HOSPITALIST

## 2020-10-11 PROCEDURE — 250N000013 HC RX MED GY IP 250 OP 250 PS 637: Performed by: NURSE PRACTITIONER

## 2020-10-11 PROCEDURE — 250N000013 HC RX MED GY IP 250 OP 250 PS 637: Performed by: HOSPITALIST

## 2020-10-11 PROCEDURE — 120N000001 HC R&B MED SURG/OB

## 2020-10-11 RX ADMIN — DOCUSATE SODIUM 100 MG: 100 CAPSULE, LIQUID FILLED ORAL at 08:10

## 2020-10-11 RX ADMIN — VENLAFAXINE HYDROCHLORIDE 75 MG: 75 CAPSULE, EXTENDED RELEASE ORAL at 08:10

## 2020-10-11 RX ADMIN — ATORVASTATIN CALCIUM 80 MG: 40 TABLET, FILM COATED ORAL at 21:19

## 2020-10-11 RX ADMIN — SALMETEROL XINAFOATE 1 PUFF: 50 POWDER, METERED ORAL; RESPIRATORY (INHALATION) at 08:10

## 2020-10-11 RX ADMIN — HALOPERIDOL 7 MG: 5 TABLET ORAL at 21:19

## 2020-10-11 RX ADMIN — SALMETEROL XINAFOATE 1 PUFF: 50 POWDER, METERED ORAL; RESPIRATORY (INHALATION) at 21:19

## 2020-10-11 RX ADMIN — MEMANTINE 5 MG: 5 TABLET ORAL at 08:10

## 2020-10-11 RX ADMIN — LEVOTHYROXINE SODIUM 88 MCG: 88 TABLET ORAL at 08:10

## 2020-10-11 RX ADMIN — NICOTINE 1 PATCH: 21 PATCH, EXTENDED RELEASE TRANSDERMAL at 08:12

## 2020-10-11 RX ADMIN — Medication 0.25 MG: at 21:19

## 2020-10-11 RX ADMIN — HALOPERIDOL 7 MG: 5 TABLET ORAL at 08:10

## 2020-10-11 RX ADMIN — ASPIRIN 81 MG: 81 TABLET, COATED ORAL at 08:11

## 2020-10-11 RX ADMIN — Medication 0.25 MG: at 08:10

## 2020-10-11 RX ADMIN — DOCUSATE SODIUM 100 MG: 100 CAPSULE, LIQUID FILLED ORAL at 21:19

## 2020-10-11 ASSESSMENT — ACTIVITIES OF DAILY LIVING (ADL)
ADLS_ACUITY_SCORE: 12
ADLS_ACUITY_SCORE: 13
ADLS_ACUITY_SCORE: 13
ADLS_ACUITY_SCORE: 12
ADLS_ACUITY_SCORE: 13
ADLS_ACUITY_SCORE: 12

## 2020-10-11 NOTE — PLAN OF CARE
DATE & TIME: 10/11/2020 6477-4228  Cognitive Concerns/ Orientation : A&Ox 2-3, disoriented to situation/time  BEHAVIOR & AGGRESSION TOOL COLOR: Green for shift  CIWA SCORE: N/A       ABNL VS/O2: Daily VS, not done for shift  MOBILITY: Independent in the room, door alarm in place  PAIN MANAGMENT: Denied  DIET: Regular  BOWEL/BLADDER: Continent to B/B  ABNL LAB/BG: No labs since 9/28  DRAIN/DEVICES: No IV access  TELEMETRY RHYTHM: N/A  SKIN: Bruised,otherwise intact  TESTS/PROCEDURES: N/A  D/C DAY/GOALS/PLACE: Discharge pending for placement.  OTHER IMPORTANT INFO: On court hold, SW following.  MD/RN ROUNDING SIGNED OFF D/E SHIFT: n/a  COMMIT TO SIT DONE AND SIGNED OFF yes

## 2020-10-11 NOTE — PLAN OF CARE
DATE & TIME: 10/11/2020 0140-8187  Cognitive Concerns/ Orientation : A&Ox 2-3, disoriented to situation/time  BEHAVIOR & AGGRESSION TOOL COLOR: Green for shift    ABNL VS/O2: Daily VS, VSS on RA  MOBILITY: Independent in the room, door alarm on  PAIN MANAGMENT: Denied  DIET: Regular  BOWEL/BLADDER: Continent to B/B  ABNL LAB/BG: No labs since 9/28  DRAIN/DEVICES: No IV access  D/C DAY/GOALS/PLACE: Discharge pending for placement.  OTHER IMPORTANT INFO: On court hold, SW following.

## 2020-10-11 NOTE — PLAN OF CARE
DATE & TIME: 10/11/2020 3928-5000  Cognitive Concerns/ Orientation : A&Ox 2-3, disoriented to situation/time  BEHAVIOR & AGGRESSION TOOL COLOR: Green for shift  CIWA SCORE: N/A       ABNL VS/O2: Daily VS, VSS on RA  MOBILITY: Independent in the room, door alarm on  PAIN MANAGMENT: Denied  DIET: Regular  BOWEL/BLADDER: Continent to B/B  ABNL LAB/BG: No labs since 9/28  DRAIN/DEVICES: No IV access  TELEMETRY RHYTHM: N/A  SKIN: Bruised,otherwise intact  TESTS/PROCEDURES: N/A  D/C DAY/GOALS/PLACE: Discharge pending for placement.  OTHER IMPORTANT INFO: On court hold, DAVINA following.

## 2020-10-11 NOTE — PROGRESS NOTES
[facility]    Hospitalist Progress Note    Assessment & Plan   John Juárez is a 54 year old male with PMHx of schizophrenia, hx of TBI, alcohol use disorder, COPD, hyperlipidemia and hypothyroidism who was admitted on 9/9/2020 for evaluation of aggressive behavior at his group home. His medical condition remains stable, though his group home is now unwilling to take him back. Hospitalization has been prolonged while arranging for new placement.     Neurocognitive disorder dt hx of TBI and alcohol use disorder  Schizophrenia  Had been residing in group home prior to admission.  Brought to hospital for evaluation of aggressive behaviors. Group home now unwilling to take him back. Has had numerous CODE 21s called this stay. Seen by psych, meds adjusted. Is currently under civil commitment and court hold through [facility] until 7/31/21.  -- last seen by psych on 9/29  -- conts on Cogentin 0.25mg BID, Haldol 7mg BID, Namenda 5mg daily, Effexor XR 75mg daily  -- monitor periodic EKGs to ensure QTc remains stable    Hyperlipidemia  Chronic and stable on aspirin and statin    COPD  Not O2 dependent.  Stable on salmeterol    Hypothyroidism  Chronic and stable on levothyroxine    Tobacco Use  Using nicotine patch    FEN: no IVFs, lytes stable, regular diet  DVT Prophylaxis: PCDs when in bed, encourage ambulation  Code Status: Full Code    Disposition: Discharge date unclear, pending placement. SW following.    Bora Walls    Interval History    Uneventful night. Seen this morning. Resting comfortably. No specific complaints.     -Data reviewed today: I reviewed all new labs and imaging results over the last 24 hours. I personally reviewed no images or EKG's today.    Physical Exam   Temp: 97.5  F (36.4  C) Temp src: Oral BP: 117/82 Pulse: 62   Resp: 18 SpO2: 100 % O2 Device: Nasal cannula with humidification    Vitals:    09/09/20 1754 09/25/20 0632   Weight: 70 kg (154 lb 5.2 oz)  71 kg (156 lb 8 oz)     Vital Signs with Ranges  Temp:  [97.5  F (36.4  C)-98.2  F (36.8  C)] 97.5  F (36.4  C)  Pulse:  [62-87] 62  Resp:  [16-18] 18  BP: ()/(60-82) 117/82  SpO2:  [98 %-100 %] 100 %  No intake/output data recorded.    Constitutional: Resting comfortably, alert but not overly conversive, oriented to self/location, NAD  Respiratory: CTAB, no wheeze/rales/rhonchi, no increased work of breathing  Cardiovascular: HRRR, soft SM most prominent along sternal border, no LE edema  GI: S, NT, ND, +BS  Skin/Integumen: warm/dry  Other:    Medications       aspirin  81 mg Oral Daily     atorvastatin  80 mg Oral At Bedtime     benztropine  0.25 mg Oral BID     docusate sodium  100 mg Oral BID     haloperidol  7 mg Oral BID     levothyroxine  88 mcg Oral Daily     memantine  5 mg Oral Daily     nicotine  1 patch Transdermal Daily     nicotine   Transdermal TID     polyethylene glycol  17 g Oral Daily     salmeterol  1 puff Inhalation BID     venlafaxine  75 mg Oral Daily with breakfast       Data   No lab results found in last 7 days.    No results found for this or any previous visit (from the past 24 hour(s)).

## 2020-10-11 NOTE — PLAN OF CARE
DATE & TIME: 10/10/2020 3442-2567  Cognitive Concerns/ Orientation : A&O x 2, disoriented to situation and time.   BEHAVIOR & AGGRESSION TOOL COLOR: Green  ABNL VS/O2: Daily VS, VSS on RA  MOBILITY: Independent in the room  PAIN MANAGMENT: Denies  DIET: Regular, no utensils. Poor appetite this evening, denies nausea.   BOWEL/BLADDER: Continent to B/B  ABNL LAB/BG: No labs since 9/28  DRAIN/DEVICES: No IV access  SKIN: Bruised,otherwise intact  D/C DAY/GOALS/PLACE: Discharge pending for placement.  OTHER IMPORTANT INFO: On court hold, DAVINA following

## 2020-10-12 PROCEDURE — 99231 SBSQ HOSP IP/OBS SF/LOW 25: CPT | Performed by: HOSPITALIST

## 2020-10-12 PROCEDURE — 250N000013 HC RX MED GY IP 250 OP 250 PS 637: Performed by: NURSE PRACTITIONER

## 2020-10-12 PROCEDURE — 250N000013 HC RX MED GY IP 250 OP 250 PS 637: Performed by: PSYCHIATRY & NEUROLOGY

## 2020-10-12 PROCEDURE — 250N000013 HC RX MED GY IP 250 OP 250 PS 637: Performed by: HOSPITALIST

## 2020-10-12 PROCEDURE — 250N000013 HC RX MED GY IP 250 OP 250 PS 637: Performed by: INTERNAL MEDICINE

## 2020-10-12 PROCEDURE — 120N000001 HC R&B MED SURG/OB

## 2020-10-12 RX ADMIN — ATORVASTATIN CALCIUM 80 MG: 40 TABLET, FILM COATED ORAL at 21:52

## 2020-10-12 RX ADMIN — POLYETHYLENE GLYCOL 3350 17 G: 17 POWDER, FOR SOLUTION ORAL at 09:37

## 2020-10-12 RX ADMIN — DOCUSATE SODIUM 100 MG: 100 CAPSULE, LIQUID FILLED ORAL at 21:52

## 2020-10-12 RX ADMIN — ASPIRIN 81 MG: 81 TABLET, COATED ORAL at 09:37

## 2020-10-12 RX ADMIN — Medication 0.25 MG: at 09:37

## 2020-10-12 RX ADMIN — MEMANTINE 5 MG: 5 TABLET ORAL at 09:37

## 2020-10-12 RX ADMIN — NICOTINE 1 PATCH: 21 PATCH, EXTENDED RELEASE TRANSDERMAL at 09:35

## 2020-10-12 RX ADMIN — SALMETEROL XINAFOATE 1 PUFF: 50 POWDER, METERED ORAL; RESPIRATORY (INHALATION) at 21:53

## 2020-10-12 RX ADMIN — HALOPERIDOL 7 MG: 5 TABLET ORAL at 09:36

## 2020-10-12 RX ADMIN — LEVOTHYROXINE SODIUM 88 MCG: 88 TABLET ORAL at 09:37

## 2020-10-12 RX ADMIN — DOCUSATE SODIUM 100 MG: 100 CAPSULE, LIQUID FILLED ORAL at 09:36

## 2020-10-12 RX ADMIN — VENLAFAXINE HYDROCHLORIDE 75 MG: 75 CAPSULE, EXTENDED RELEASE ORAL at 09:36

## 2020-10-12 RX ADMIN — HALOPERIDOL 7 MG: 5 TABLET ORAL at 21:53

## 2020-10-12 RX ADMIN — Medication 0.25 MG: at 21:52

## 2020-10-12 ASSESSMENT — ACTIVITIES OF DAILY LIVING (ADL)
ADLS_ACUITY_SCORE: 13
ADLS_ACUITY_SCORE: 13
ADLS_ACUITY_SCORE: 12
ADLS_ACUITY_SCORE: 13

## 2020-10-12 NOTE — PLAN OF CARE
DATE & TIME: 10/12/2020 8304-0090  Cognitive Concerns/ Orientation : A&Ox 2, disoriented to situation/time  BEHAVIOR & AGGRESSION TOOL COLOR: Green for shift    ABNL VS/O2: Daily VS  MOBILITY: Independent in the room, door alarm on  PAIN MANAGMENT: Denied  DIET: Regular  BOWEL/BLADDER: Continent of B/B  ABNL LAB/BG: No labs since 9/28  DRAIN/DEVICES: No IV access  D/C DAY/GOALS/PLACE: Discharge pending for placement.  OTHER IMPORTANT INFO: On court hold, SW following.

## 2020-10-12 NOTE — PLAN OF CARE
DATE & TIME: 10/12/2020 8038-1723  Cognitive Concerns/ Orientation : A&O to self only  BEHAVIOR & AGGRESSION TOOL COLOR: Green, calm and cooperative.   ABNL VS/O2: Daily VS  MOBILITY: Independent in the room, steady, door alarm in place  PAIN MANAGMENT: Denied  DIET: Regular  BOWEL/BLADDER: Continent of B/B, unable to report his last BM, BS+, on scheduled stool softener. Passing flatus.   ABNL LAB/BG: No labs since 9/28  DRAIN/DEVICES: No IV access  D/C DAY/GOALS/PLACE: Discharge pending for placement.  OTHER IMPORTANT INFO: On court hold, SW following. Nicotine patch on Lt arm.

## 2020-10-12 NOTE — PROGRESS NOTES
[facility] [place]    Hospitalist Progress Note    Assessment & Plan   John Juárez is a 54 year old male with PMHx of schizophrenia, hx of TBI, alcohol use disorder, COPD, hyperlipidemia and hypothyroidism who was admitted on 9/9/2020 for evaluation of aggressive behavior at his group home. His medical condition remains stable, though his group home is now unwilling to take him back. Hospitalization has been prolonged while arranging for new placement.     Neurocognitive disorder dt hx of TBI and alcohol use disorder  Schizophrenia  Had been residing in group home prior to admission.  Brought to hospital for evaluation of aggressive behaviors. Group home now unwilling to take him back. Has had numerous CODE 21s called this stay. Seen by psych, meds adjusted. Is currently under civil commitment and court hold through [facility] until 7/31/21.  -- last seen by psych on 9/29  -- conts on Cogentin 0.25mg BID, Haldol 7mg BID, Namenda 5mg daily, Effexor XR 75mg daily  -- monitor periodic EKGs to ensure QTc remains stable    Hyperlipidemia  Chronic and stable on aspirin and statin    COPD  Not O2 dependent.  Stable on salmeterol    Hypothyroidism  Chronic and stable on levothyroxine    Tobacco Use  Using nicotine patch    FEN: no IVFs, lytes stable, regular diet  DVT Prophylaxis: PCDs when in bed, encourage ambulation  Code Status: Full Code    Disposition: Discharge date unclear, pending placement. SW following.    Bora Walls    Interval History    Uneventful night. Seen this morning. Eating well this morning.    -Data reviewed today: I reviewed all new labs and imaging results over the last 24 hours. I personally reviewed no images or EKG's today.    Physical Exam   Temp: 98  F (36.7  C) Temp src: Oral BP: 101/72 Pulse: 64   Resp: 16 SpO2: 98 % O2 Device: None (Room air)    Vitals:    09/09/20 1754 09/25/20 0632   Weight: 70 kg (154 lb 5.2 oz) 71 kg (156 lb 8 oz)     Vital Signs with  Ranges  Temp:  [97.5  F (36.4  C)-98  F (36.7  C)] 98  F (36.7  C)  Pulse:  [62-64] 64  Resp:  [16-18] 16  BP: (101-117)/(72-82) 101/72  SpO2:  [98 %-100 %] 98 %  No intake/output data recorded.    Constitutional: Resting comfortably, alert but not overly conversive, oriented to self/location, NAD  Respiratory: CTAB, no wheeze/rales/rhonchi, no increased work of breathing  Cardiovascular: HRRR, soft SM most prominent along sternal border, no LE edema  GI: S, NT, ND, +BS  Skin/Integumen: warm/dry  Other:    Medications       aspirin  81 mg Oral Daily     atorvastatin  80 mg Oral At Bedtime     benztropine  0.25 mg Oral BID     docusate sodium  100 mg Oral BID     haloperidol  7 mg Oral BID     levothyroxine  88 mcg Oral Daily     memantine  5 mg Oral Daily     nicotine  1 patch Transdermal Daily     nicotine   Transdermal TID     polyethylene glycol  17 g Oral Daily     salmeterol  1 puff Inhalation BID     venlafaxine  75 mg Oral Daily with breakfast       Data   No lab results found in last 7 days.    No results found for this or any previous visit (from the past 24 hour(s)).

## 2020-10-13 PROCEDURE — 99231 SBSQ HOSP IP/OBS SF/LOW 25: CPT | Performed by: HOSPITALIST

## 2020-10-13 PROCEDURE — 250N000013 HC RX MED GY IP 250 OP 250 PS 637: Performed by: NURSE PRACTITIONER

## 2020-10-13 PROCEDURE — 120N000001 HC R&B MED SURG/OB

## 2020-10-13 PROCEDURE — 250N000013 HC RX MED GY IP 250 OP 250 PS 637: Performed by: HOSPITALIST

## 2020-10-13 PROCEDURE — 250N000013 HC RX MED GY IP 250 OP 250 PS 637: Performed by: INTERNAL MEDICINE

## 2020-10-13 PROCEDURE — 250N000013 HC RX MED GY IP 250 OP 250 PS 637: Performed by: PSYCHIATRY & NEUROLOGY

## 2020-10-13 RX ADMIN — ASPIRIN 81 MG: 81 TABLET, COATED ORAL at 08:10

## 2020-10-13 RX ADMIN — HALOPERIDOL 7 MG: 5 TABLET ORAL at 08:10

## 2020-10-13 RX ADMIN — Medication 0.25 MG: at 08:10

## 2020-10-13 RX ADMIN — VENLAFAXINE HYDROCHLORIDE 75 MG: 75 CAPSULE, EXTENDED RELEASE ORAL at 08:10

## 2020-10-13 RX ADMIN — SALMETEROL XINAFOATE 1 PUFF: 50 POWDER, METERED ORAL; RESPIRATORY (INHALATION) at 21:20

## 2020-10-13 RX ADMIN — LEVOTHYROXINE SODIUM 88 MCG: 88 TABLET ORAL at 08:10

## 2020-10-13 RX ADMIN — Medication 0.25 MG: at 20:15

## 2020-10-13 RX ADMIN — ATORVASTATIN CALCIUM 80 MG: 40 TABLET, FILM COATED ORAL at 20:15

## 2020-10-13 RX ADMIN — HALOPERIDOL 7 MG: 5 TABLET ORAL at 20:15

## 2020-10-13 RX ADMIN — DOCUSATE SODIUM 100 MG: 100 CAPSULE, LIQUID FILLED ORAL at 20:15

## 2020-10-13 RX ADMIN — OLANZAPINE 10 MG: 5 TABLET, ORALLY DISINTEGRATING ORAL at 23:05

## 2020-10-13 RX ADMIN — POLYETHYLENE GLYCOL 3350 17 G: 17 POWDER, FOR SOLUTION ORAL at 08:10

## 2020-10-13 RX ADMIN — NICOTINE 1 PATCH: 21 PATCH, EXTENDED RELEASE TRANSDERMAL at 08:10

## 2020-10-13 RX ADMIN — MEMANTINE 5 MG: 5 TABLET ORAL at 08:10

## 2020-10-13 RX ADMIN — SALMETEROL XINAFOATE 1 PUFF: 50 POWDER, METERED ORAL; RESPIRATORY (INHALATION) at 08:10

## 2020-10-13 RX ADMIN — DOCUSATE SODIUM 100 MG: 100 CAPSULE, LIQUID FILLED ORAL at 08:10

## 2020-10-13 ASSESSMENT — ACTIVITIES OF DAILY LIVING (ADL)
ADLS_ACUITY_SCORE: 12
ADLS_ACUITY_SCORE: 13
ADLS_ACUITY_SCORE: 12
ADLS_ACUITY_SCORE: 13

## 2020-10-13 NOTE — PLAN OF CARE
Cognitive Concerns/ Orientation : A&O to self and time.  BEHAVIOR & AGGRESSION TOOL COLOR: Green, calm and cooperative.   ABNL VS/O2: Daily VS.  MOBILITY: Independent in the room, steady, door alarm in place  PAIN MANAGMENT: Denied  DIET: Regular  BOWEL/BLADDER: Continent of B/B, last bm charted on 10/10 20, BS+, on scheduled stool softener. Passing flatus.   ABNL LAB/BG: No labs since 9/28  DRAIN/DEVICES: No IV access  D/C DAY/GOALS/PLACE: Discharge pending for placement.  OTHER IMPORTANT INFO: On court hold, SW following. Nicotine patch on left arm.

## 2020-10-13 NOTE — PROGRESS NOTES
Care Management Follow Up Note    Length of Stay (days) 24    Patient plan of care discussed at Interdisciplinary Rounds: Yes  Expected Discharge Date: Pending placement and pending BI waiver for funding  Concerns to be Addressed:   Writer called [facility] at [phone] and spoke with Intake contact, Joycelyn ([phone]) to initiate a new referral for the BI waiver. Joycelyn stated Carol Ann Kate, patient's waiver  with [employer] must close out the old waiver for patient to be eligible for a new application. Did leave a message for Carol Ann with this request. Their agency only has the capacity to leave messages.    Anticipated Discharge Disposition:    Anticipated Discharge Services:    Anticipated Discharge DME:      Plan: SW will follow up with Joycelyn in about a week to check on the progress of the new waiver application.      JAYLYN Beckwith

## 2020-10-13 NOTE — PLAN OF CARE
Cognitive Concerns/ Orientation : A&O to self ant time.  BEHAVIOR & AGGRESSION TOOL COLOR: Green, calm and cooperative.   ABNL VS/O2: Daily VS. VSS. RA  MOBILITY: Independent in the room, steady, door alarm in place  PAIN MANAGMENT: Denied  DIET: Regular  BOWEL/BLADDER: Continent of B/B, last bm charted on 10/10 20, BS+, on scheduled stool softener. Passing flatus.   ABNL LAB/BG: No labs since 9/28  DRAIN/DEVICES: No IV access  D/C DAY/GOALS/PLACE: Discharge pending for placement.  OTHER IMPORTANT INFO: On court hold, SW following. Nicotine patch on left arm.

## 2020-10-13 NOTE — PROGRESS NOTES
[facility]    Hospitalist Progress Note    Assessment & Plan   John Juárez is a 54 year old male with PMHx of schizophrenia, hx of TBI, alcohol use disorder, COPD, hyperlipidemia and hypothyroidism who was admitted on 9/9/2020 for evaluation of aggressive behavior at his group home. His medical condition remains stable, though his group home is now unwilling to take him back. Hospitalization has been prolonged while arranging for new placement.     Neurocognitive disorder dt hx of TBI and alcohol use disorder  Schizophrenia  Had been residing in group home prior to admission.  Brought to hospital for evaluation of aggressive behaviors. Group home now unwilling to take him back. Has had numerous CODE 21s called this stay. Seen by psych, meds adjusted. Is currently under civil commitment and court hold through [facility] until 7/31/21.  -- last seen by psych on 9/29  -- conts on Cogentin 0.25mg BID, Haldol 7mg BID, Namenda 5mg daily, Effexor XR 75mg daily  -- monitor periodic EKGs to ensure QTc remains stable    Hyperlipidemia  Chronic and stable on aspirin and statin    COPD  Not O2 dependent.  Stable on salmeterol    Hypothyroidism  Chronic and stable on levothyroxine    Tobacco Use  Using nicotine patch    FEN: no IVFs, lytes stable, regular diet  DVT Prophylaxis: PCDs when in bed, encourage ambulation  Code Status: Full Code    Disposition: Discharge date unclear, pending placement. SW following.    Bora Walls    Interval History    Uneventful night. Seen this morning. Eating well this morning.    -Data reviewed today: I reviewed all new labs and imaging results over the last 24 hours. I personally reviewed no images or EKG's today.    Physical Exam   Temp: 97.4  F (36.3  C) Temp src: Oral BP: 92/61 Pulse: 62   Resp: 16 SpO2: 97 % O2 Device: None (Room air)    Vitals:    09/09/20 1754 09/25/20 0632   Weight: 70 kg (154 lb 5.2 oz) 71 kg (156 lb 8 oz)     Vital Signs with  Ranges  Temp:  [97.4  F (36.3  C)-98.1  F (36.7  C)] 97.4  F (36.3  C)  Pulse:  [62-72] 62  Resp:  [16] 16  BP: ()/(61-75) 92/61  SpO2:  [97 %-100 %] 97 %  I/O last 3 completed shifts:  In: 440 [P.O.:440]  Out: -     Constitutional: Resting comfortably, alert but not overly conversive, oriented to self/location, NAD  Respiratory: CTAB, no wheeze/rales/rhonchi, no increased work of breathing  Cardiovascular: HRRR, soft SM most prominent along sternal border, no LE edema  GI: S, NT, ND, +BS  Skin/Integumen: warm/dry  Other:    Medications       aspirin  81 mg Oral Daily     atorvastatin  80 mg Oral At Bedtime     benztropine  0.25 mg Oral BID     docusate sodium  100 mg Oral BID     haloperidol  7 mg Oral BID     levothyroxine  88 mcg Oral Daily     memantine  5 mg Oral Daily     nicotine  1 patch Transdermal Daily     nicotine   Transdermal TID     polyethylene glycol  17 g Oral Daily     salmeterol  1 puff Inhalation BID     venlafaxine  75 mg Oral Daily with breakfast       Data   No lab results found in last 7 days.    No results found for this or any previous visit (from the past 24 hour(s)).

## 2020-10-13 NOTE — PLAN OF CARE
DATE & TIME: 10/13/2020 1727-7701    Cognitive Concerns/ Orientation : alert to self only.    BEHAVIOR & AGGRESSION TOOL COLOR: green   CIWA SCORE: na    ABNL VS/O2: vss, on RA  MOBILITY: ind in room.   PAIN MANAGMENT: denied pain.   DIET: regular, good appetite.   BOWEL/BLADDER: continent.   ABNL LAB/BG: na   DRAIN/DEVICES: na   TELEMETRY RHYTHM: na   SKIN: intact, tattoo.   TESTS/PROCEDURES: na   D/C DAY/GOALS/PLACE: pending placement.   OTHER IMPORTANT INFO: tremor on hands at baseline, pt stated had not changed.   MD/RN ROUNDING SIGNED OFF D/E SHIFT: yes   COMMIT TO SIT DONE AND SIGNED OFF yes

## 2020-10-14 PROCEDURE — 250N000013 HC RX MED GY IP 250 OP 250 PS 637: Performed by: INTERNAL MEDICINE

## 2020-10-14 PROCEDURE — 120N000001 HC R&B MED SURG/OB

## 2020-10-14 PROCEDURE — 99231 SBSQ HOSP IP/OBS SF/LOW 25: CPT | Performed by: HOSPITALIST

## 2020-10-14 PROCEDURE — 250N000013 HC RX MED GY IP 250 OP 250 PS 637: Performed by: PSYCHIATRY & NEUROLOGY

## 2020-10-14 PROCEDURE — 250N000013 HC RX MED GY IP 250 OP 250 PS 637: Performed by: NURSE PRACTITIONER

## 2020-10-14 PROCEDURE — 250N000013 HC RX MED GY IP 250 OP 250 PS 637: Performed by: HOSPITALIST

## 2020-10-14 RX ADMIN — ATORVASTATIN CALCIUM 80 MG: 40 TABLET, FILM COATED ORAL at 20:58

## 2020-10-14 RX ADMIN — Medication 0.25 MG: at 20:58

## 2020-10-14 RX ADMIN — MEMANTINE 5 MG: 5 TABLET ORAL at 08:40

## 2020-10-14 RX ADMIN — VENLAFAXINE HYDROCHLORIDE 75 MG: 75 CAPSULE, EXTENDED RELEASE ORAL at 08:39

## 2020-10-14 RX ADMIN — DOCUSATE SODIUM 100 MG: 100 CAPSULE, LIQUID FILLED ORAL at 08:40

## 2020-10-14 RX ADMIN — ASPIRIN 81 MG: 81 TABLET, COATED ORAL at 08:39

## 2020-10-14 RX ADMIN — POLYETHYLENE GLYCOL 3350 17 G: 17 POWDER, FOR SOLUTION ORAL at 08:39

## 2020-10-14 RX ADMIN — SALMETEROL XINAFOATE 1 PUFF: 50 POWDER, METERED ORAL; RESPIRATORY (INHALATION) at 21:01

## 2020-10-14 RX ADMIN — HALOPERIDOL 7 MG: 5 TABLET ORAL at 20:57

## 2020-10-14 RX ADMIN — HALOPERIDOL 7 MG: 5 TABLET ORAL at 08:39

## 2020-10-14 RX ADMIN — DOCUSATE SODIUM 100 MG: 100 CAPSULE, LIQUID FILLED ORAL at 20:57

## 2020-10-14 RX ADMIN — NICOTINE 1 PATCH: 21 PATCH, EXTENDED RELEASE TRANSDERMAL at 08:39

## 2020-10-14 RX ADMIN — LEVOTHYROXINE SODIUM 88 MCG: 88 TABLET ORAL at 08:39

## 2020-10-14 RX ADMIN — Medication 0.25 MG: at 08:39

## 2020-10-14 ASSESSMENT — ACTIVITIES OF DAILY LIVING (ADL)
ADLS_ACUITY_SCORE: 13

## 2020-10-14 NOTE — PROGRESS NOTES
BRIEF NUTRITION REASSESSMENT    CURRENT DIET AND INTAKE:  Diet: Regular diet  Safe tray + No Utensils  Room service appropriate    - Patient continues to order large meals consistently, % of meals consumed most often per RN documentation.    LABS:  None since 9/28    NUTRITION INTERVENTION:  Nutrition Diagnosis:  No nutrition diagnosis at this time.    Implementation:  Nutrition Education:  Per Provider order if indicated    FOLLOW UP/MONITORING:   Will re-evaluate in 7 - 10 days, or sooner, if re-consulted.      Fely Hernandes RD, LD  [facility], [address], [facility]   Pager: [phone]  Weekend Pager: [phone]

## 2020-10-14 NOTE — PLAN OF CARE
DATE & TIME: 10/13/2020 6572-9759  Cognitive Concerns/ Orientation: alert to self only.    BEHAVIOR & AGGRESSION TOOL COLOR: yellow   CIWA SCORE: na    ABNL VS/O2: VSS on RA  MOBILITY: ind in room.   PAIN MANAGMENT: denies pain.   DIET: regular, good appetite.   BOWEL/BLADDER: continent.   ABNL LAB/BG: na   DRAIN/DEVICES: na   TELEMETRY RHYTHM: na   SKIN: intact.   TESTS/PROCEDURES: na   D/C DAY/GOALS/PLACE: pending placement.   OTHER IMPORTANT INFO: tremor on hands at baseline. Pt attempting to leave to get cigarettes and talk to police about the money his family is stealing from him. Attempted redirection, PRN PO zyprexa given.

## 2020-10-14 NOTE — PLAN OF CARE
DATE & TIME: 10/14/2020 9745-1922                      Cognitive Concerns/ Orientation : alert to self only.    BEHAVIOR & AGGRESSION TOOL COLOR: green   CIWA SCORE: na      ABNL VS/O2: vss, on RA  MOBILITY: ind in room.   PAIN MANAGMENT: denied pain.   DIET: regular, good appetite.   BOWEL/BLADDER: continent.   ABNL LAB/BG: na   DRAIN/DEVICES: na   TELEMETRY RHYTHM: na   SKIN: intact, tattoo.   TESTS/PROCEDURES: na   D/C DAY/GOALS/PLACE: pending placement.   OTHER IMPORTANT INFO: tremor on hands at baseline, pt stated had not changed. Court hold.   MD/RN ROUNDING SIGNED OFF D/E SHIFT: yes   COMMIT TO SIT DONE AND SIGNED OFF yes

## 2020-10-14 NOTE — PROGRESS NOTES
[facility]  Hospitalist Progress Note    Assessment & Plan   John Juárez is a 54 year old male with PMHx of schizophrenia, hx of TBI, alcohol use disorder, COPD, hyperlipidemia and hypothyroidism who was admitted on 9/9/2020 for evaluation of aggressive behavior at his group home. His medical condition remains stable, though his group home is now unwilling to take him back. Hospitalization has been prolonged while arranging for new placement.     Neurocognitive disorder dt hx of TBI and alcohol use disorder  Schizophrenia  Had been residing in group home prior to admission.  Brought to hospital for evaluation of aggressive behaviors. Group home now unwilling to take him back. Has had numerous CODE 21s called this stay. Seen by psych, meds adjusted. Is currently under civil commitment and court hold through [facility] until 7/31/21.  -- last seen by psych on 9/29  -- conts on Cogentin 0.25mg BID, Haldol 7mg BID, Namenda 5mg daily, Effexor XR 75mg daily  -- monitor periodic EKGs to ensure QTc remains stable    Hyperlipidemia  Chronic and stable on aspirin and statin    COPD  Not O2 dependent.  Stable on salmeterol    Hypothyroidism  Chronic and stable on levothyroxine    Tobacco Use  Using nicotine patch    FEN: no IVFs, lytes stable, regular diet  DVT Prophylaxis: PCDs when in bed, encourage ambulation  Code Status: Full Code    Disposition: Discharge date unclear, pending placement. SW following.    Hermilo Sousa    Interval History    Uneventful night. Seen this morning. Eating well this morning.    -Data reviewed today: I reviewed all new labs and imaging results over the last 24 hours. I personally reviewed no images or EKG's today.    Physical Exam   Temp: 98.2  F (36.8  C) Temp src: Oral BP: 92/62 Pulse: 74   Resp: 16 SpO2: 95 % O2 Device: None (Room air)    Vitals:    09/09/20 1754 09/25/20 0632   Weight: 70 kg (154 lb 5.2 oz) 71 kg (156 lb 8 oz)     Vital Signs with  Ranges  Temp:  [98.2  F (36.8  C)] 98.2  F (36.8  C)  Pulse:  [74] 74  Resp:  [16] 16  BP: (92)/(62) 92/62  SpO2:  [95 %] 95 %  I/O last 3 completed shifts:  In: 600 [P.O.:600]  Out: -     Constitutional: Resting comfortably, alert but not overly conversive, oriented to self/location, NAD  Respiratory: CTAB, no wheeze/rales/rhonchi, no increased work of breathing  Cardiovascular: HRRR, soft SM most prominent along sternal border, no LE edema  GI: S, NT, ND, +BS  Skin/Integumen: warm/dry  Other:    Medications       aspirin  81 mg Oral Daily     atorvastatin  80 mg Oral At Bedtime     benztropine  0.25 mg Oral BID     docusate sodium  100 mg Oral BID     haloperidol  7 mg Oral BID     levothyroxine  88 mcg Oral Daily     memantine  5 mg Oral Daily     nicotine  1 patch Transdermal Daily     nicotine   Transdermal TID     polyethylene glycol  17 g Oral Daily     salmeterol  1 puff Inhalation BID     venlafaxine  75 mg Oral Daily with breakfast       Data   No lab results found in last 7 days.    No results found for this or any previous visit (from the past 24 hour(s)).

## 2020-10-15 PROCEDURE — 99207 PR NO CHARGE LOS: CPT | Performed by: HOSPITALIST

## 2020-10-15 PROCEDURE — 99207 PR NO CHARGE LOS: CPT | Performed by: NURSE PRACTITIONER

## 2020-10-15 PROCEDURE — 250N000013 HC RX MED GY IP 250 OP 250 PS 637: Performed by: HOSPITALIST

## 2020-10-15 PROCEDURE — 250N000013 HC RX MED GY IP 250 OP 250 PS 637: Performed by: PSYCHIATRY & NEUROLOGY

## 2020-10-15 PROCEDURE — 250N000013 HC RX MED GY IP 250 OP 250 PS 637: Performed by: INTERNAL MEDICINE

## 2020-10-15 PROCEDURE — 120N000001 HC R&B MED SURG/OB

## 2020-10-15 PROCEDURE — 250N000013 HC RX MED GY IP 250 OP 250 PS 637: Performed by: NURSE PRACTITIONER

## 2020-10-15 RX ADMIN — MEMANTINE 5 MG: 5 TABLET ORAL at 08:26

## 2020-10-15 RX ADMIN — OLANZAPINE 10 MG: 5 TABLET, ORALLY DISINTEGRATING ORAL at 17:30

## 2020-10-15 RX ADMIN — HALOPERIDOL 7 MG: 5 TABLET ORAL at 08:26

## 2020-10-15 RX ADMIN — HALOPERIDOL 7 MG: 5 TABLET ORAL at 20:00

## 2020-10-15 RX ADMIN — POLYETHYLENE GLYCOL 3350 17 G: 17 POWDER, FOR SOLUTION ORAL at 08:25

## 2020-10-15 RX ADMIN — Medication 0.25 MG: at 20:00

## 2020-10-15 RX ADMIN — DOCUSATE SODIUM 100 MG: 100 CAPSULE, LIQUID FILLED ORAL at 20:00

## 2020-10-15 RX ADMIN — ATORVASTATIN CALCIUM 80 MG: 40 TABLET, FILM COATED ORAL at 20:00

## 2020-10-15 RX ADMIN — DOCUSATE SODIUM 100 MG: 100 CAPSULE, LIQUID FILLED ORAL at 08:26

## 2020-10-15 RX ADMIN — NICOTINE 1 PATCH: 21 PATCH, EXTENDED RELEASE TRANSDERMAL at 08:25

## 2020-10-15 RX ADMIN — VENLAFAXINE HYDROCHLORIDE 75 MG: 75 CAPSULE, EXTENDED RELEASE ORAL at 08:26

## 2020-10-15 RX ADMIN — SALMETEROL XINAFOATE 1 PUFF: 50 POWDER, METERED ORAL; RESPIRATORY (INHALATION) at 20:02

## 2020-10-15 RX ADMIN — SALMETEROL XINAFOATE 1 PUFF: 50 POWDER, METERED ORAL; RESPIRATORY (INHALATION) at 08:26

## 2020-10-15 RX ADMIN — LEVOTHYROXINE SODIUM 88 MCG: 88 TABLET ORAL at 08:25

## 2020-10-15 RX ADMIN — Medication 0.25 MG: at 08:26

## 2020-10-15 RX ADMIN — ASPIRIN 81 MG: 81 TABLET, COATED ORAL at 08:25

## 2020-10-15 ASSESSMENT — ACTIVITIES OF DAILY LIVING (ADL)
ADLS_ACUITY_SCORE: 13

## 2020-10-15 NOTE — CODE/RAPID RESPONSE
[facility]    RRT Note:  CODE 21/Restraint check  10/24/2019   Time Called: 1733    Code Status: Full Code    RRT called for: I was called to evaluate Mr. Juárez after brief attempt at elopement.  Patient is on a current court hold, and began responding to internal stimuli.  Nursing administered 10 mg p.o. Zyprexa, shortly after this the patient calmly stood up and attempted to leave the hospital via stairwell.  A code 21 was called, security and nursing staff walked the patient back to his room.  Upon my arrival, the patient is calmly sitting up in bed.  He appears to be responding to internal auditory hallucinations, stating he is speaking with his  at that moment.    Fortunately, the patient did agree to stay, got into bed voluntarily, covered himself with blankets remain calm.  No additional medications were administered nor were restraints were applied.    Code Status: Full Code    MAIA Orta CNP  House Officer  Hospitalist Service  Pager: [phone] (7c - 6o)    Time Spent on this Encounter   I spent 15 (10 face time, 5 interpreting results, updating providers and completing documentation) minutes on the unit/floor managing the care of John Juáerz. Over 50% of my time was spent counseling the patient and/or coordinating care regarding services listed in this note.

## 2020-10-15 NOTE — PLAN OF CARE
DATE & TIME: 10/15/2020 9026-0037  Cognitive Concerns/ Orientation : alert to self only.    BEHAVIOR & AGGRESSION TOOL COLOR: green   CIWA SCORE: na      ABNL VS/O2: VSS on RA  MOBILITY: Independent in the room, door alarm in place  PAIN MANAGMENT: denied pain   DIET: regular  BOWEL/BLADDER: continent.   ABNL LAB/BG: na   DRAIN/DEVICES: na   TELEMETRY RHYTHM: na   SKIN: intact, tattoo.   TESTS/PROCEDURES: na   D/C DAY/GOALS/PLACE: pending placement.   OTHER IMPORTANT INFO: Court hold.   MD/RN ROUNDING SIGNED OFF D/E SHIFT: n/a   COMMIT TO SIT DONE AND SIGNED OFF yes

## 2020-10-15 NOTE — PLAN OF CARE
"DATE & TIME: 10/15/2020 3055-9557                      Cognitive Concerns/ Orientation : alert to self only.    BEHAVIOR & AGGRESSION TOOL COLOR: green   CIWA SCORE: na      ABNL VS/O2: vss, on RA  MOBILITY: ind in room.   PAIN MANAGMENT: denied pain.   DIET: regular, good appetite.   BOWEL/BLADDER: continent.   ABNL LAB/BG: na   DRAIN/DEVICES: na   TELEMETRY RHYTHM: na   SKIN: intact, tattoo, declined shower.  TESTS/PROCEDURES: na   D/C DAY/GOALS/PLACE: pending placement,  following.  OTHER IMPORTANT INFO: tremor on hands at baseline, pt stated had not changed. Court hold.   MD/RN ROUNDING SIGNED OFF D/E SHIFT: yes   COMMIT TO SIT DONE AND SIGNED OFF yes     1730: pt found leaving room, going down stairwell. Pt was stopped at 1st floor elevator by security. Pt was escorted back to room, wanting to talk to . Prior to escaping from our unit, pt was found opening room, saying \"get me the fuck out of here\". Pt was redirected back to room, back in bed. Pt was given Zyprexa ODT 10 mg at that time. No further intervention needed. Will have sitter present.    "

## 2020-10-16 PROCEDURE — 120N000001 HC R&B MED SURG/OB

## 2020-10-16 PROCEDURE — 250N000013 HC RX MED GY IP 250 OP 250 PS 637: Performed by: HOSPITALIST

## 2020-10-16 PROCEDURE — 250N000013 HC RX MED GY IP 250 OP 250 PS 637: Performed by: INTERNAL MEDICINE

## 2020-10-16 PROCEDURE — 99231 SBSQ HOSP IP/OBS SF/LOW 25: CPT | Performed by: HOSPITALIST

## 2020-10-16 PROCEDURE — 250N000013 HC RX MED GY IP 250 OP 250 PS 637: Performed by: NURSE PRACTITIONER

## 2020-10-16 PROCEDURE — 250N000013 HC RX MED GY IP 250 OP 250 PS 637: Performed by: PSYCHIATRY & NEUROLOGY

## 2020-10-16 RX ADMIN — Medication 0.25 MG: at 20:10

## 2020-10-16 RX ADMIN — NICOTINE 1 PATCH: 21 PATCH, EXTENDED RELEASE TRANSDERMAL at 08:49

## 2020-10-16 RX ADMIN — SALMETEROL XINAFOATE 1 PUFF: 50 POWDER, METERED ORAL; RESPIRATORY (INHALATION) at 21:10

## 2020-10-16 RX ADMIN — HALOPERIDOL 7 MG: 5 TABLET ORAL at 20:10

## 2020-10-16 RX ADMIN — DOCUSATE SODIUM 100 MG: 100 CAPSULE, LIQUID FILLED ORAL at 20:10

## 2020-10-16 RX ADMIN — POLYETHYLENE GLYCOL 3350 17 G: 17 POWDER, FOR SOLUTION ORAL at 08:48

## 2020-10-16 RX ADMIN — HALOPERIDOL 7 MG: 5 TABLET ORAL at 08:48

## 2020-10-16 RX ADMIN — MEMANTINE 5 MG: 5 TABLET ORAL at 08:48

## 2020-10-16 RX ADMIN — ASPIRIN 81 MG: 81 TABLET, COATED ORAL at 08:48

## 2020-10-16 RX ADMIN — DOCUSATE SODIUM 100 MG: 100 CAPSULE, LIQUID FILLED ORAL at 08:48

## 2020-10-16 RX ADMIN — ATORVASTATIN CALCIUM 80 MG: 40 TABLET, FILM COATED ORAL at 20:10

## 2020-10-16 RX ADMIN — LEVOTHYROXINE SODIUM 88 MCG: 88 TABLET ORAL at 08:48

## 2020-10-16 RX ADMIN — SALMETEROL XINAFOATE 1 PUFF: 50 POWDER, METERED ORAL; RESPIRATORY (INHALATION) at 08:49

## 2020-10-16 RX ADMIN — Medication 0.25 MG: at 08:48

## 2020-10-16 RX ADMIN — VENLAFAXINE HYDROCHLORIDE 75 MG: 75 CAPSULE, EXTENDED RELEASE ORAL at 08:48

## 2020-10-16 ASSESSMENT — ACTIVITIES OF DAILY LIVING (ADL)
ADLS_ACUITY_SCORE: 13

## 2020-10-16 NOTE — PLAN OF CARE
DATE & TIME: 10/16/2020 AM  Cognitive Concerns/ Orientation : alert to self only.    BEHAVIOR & AGGRESSION TOOL COLOR: green   CIWA SCORE: na      ABNL VS/O2: VSS on RA  MOBILITY: Independent in the room, door alarm in place  PAIN MANAGMENT: denied pain   DIET: regular  BOWEL/BLADDER: continent.   ABNL LAB/BG: na   DRAIN/DEVICES: na   TELEMETRY RHYTHM: na   SKIN: intact, tattoo.   TESTS/PROCEDURES: na   D/C DAY/GOALS/PLACE: pending placement.   OTHER IMPORTANT INFO: Court hold.   MD/RN ROUNDING SIGNED OFF D/E SHIFT: n/a   COMMIT TO SIT DONE AND SIGNED OFF yes

## 2020-10-16 NOTE — PLAN OF CARE
DATE & TIME: 10/16/2020 5561-9714  Cognitive Concerns/ Orientation : alert to self only.    BEHAVIOR & AGGRESSION TOOL COLOR: green   CIWA SCORE: na      ABNL VS/O2: VSS on RA  MOBILITY: Independent in the room, door alarm in place  PAIN MANAGMENT: denied pain   DIET: regular  BOWEL/BLADDER: continent.   ABNL LAB/BG: na   DRAIN/DEVICES: na   TELEMETRY RHYTHM: na   SKIN: intact, tattoo.   TESTS/PROCEDURES: na   D/C DAY/GOALS/PLACE: pending placement.   OTHER IMPORTANT INFO: Court hold.   MD/RN ROUNDING SIGNED OFF D/E SHIFT: n/a   COMMIT TO SIT DONE AND SIGNED OFF yes

## 2020-10-16 NOTE — PLAN OF CARE
8749-2094  No changes. Patient slept between cares.    Cognitive Concerns/ Orientation : alert to self only.    BEHAVIOR & AGGRESSION TOOL COLOR: green   CIWA SCORE: na      ABNL VS/O2: VSS on RA  MOBILITY: Independent in the room, door alarm in place  PAIN MANAGMENT: denied pain   DIET: regular  BOWEL/BLADDER: continent.   ABNL LAB/BG: na   DRAIN/DEVICES: na   TELEMETRY RHYTHM: na   SKIN: intact, tattoo.   TESTS/PROCEDURES: na   D/C DAY/GOALS/PLACE: pending placement.   OTHER IMPORTANT INFO: Court hold.

## 2020-10-16 NOTE — PROGRESS NOTES
[facility]  Hospitalist Progress Note    Assessment & Plan   John Juárez is a 54 year old male with PMHx of schizophrenia, hx of TBI, alcohol use disorder, COPD, hyperlipidemia and hypothyroidism who was admitted on 9/9/2020 for evaluation of aggressive behavior at his group home. His medical condition remains stable, though his group home is now unwilling to take him back. Hospitalization has been prolonged while arranging for new placement.     Neurocognitive disorder dt hx of TBI and alcohol use disorder  Schizophrenia  Had been residing in group home prior to admission.  Brought to hospital for evaluation of aggressive behaviors. Group home now unwilling to take him back. Has had numerous CODE 21s called this stay. Seen by psych, meds adjusted. Is currently under civil commitment and court hold through [facility] until 7/31/21.  -- last seen by psych on 9/29  -- conts on Cogentin 0.25mg BID, Haldol 7mg BID, Namenda 5mg daily, Effexor XR 75mg daily  -- PRN zyprexa available for agitation  -- monitor periodic EKGs to ensure QTc remains stable    Hyperlipidemia  Chronic and stable on aspirin and statin    COPD  Not O2 dependent.  Stable on salmeterol    Hypothyroidism  Chronic and stable on levothyroxine    Tobacco Use  Using nicotine patch    FEN: no IVFs, lytes stable, regular diet  DVT Prophylaxis: PCDs when in bed, encourage ambulation  Code Status: Full Code    Disposition: Discharge date unclear, pending placement. SW following.    Hermilo Sousa MD  Hospitalist    Interval History    Overnight event noted, patient wanted to leave yesterday.  He is wondering why he can not go home.     -Data reviewed today: I reviewed all new labs and imaging results over the last 24 hours. I personally reviewed no images or EKG's today.    Physical Exam   Temp: 97.6  F (36.4  C) Temp src: Oral BP: 99/67 Pulse: 80   Resp: 16 SpO2: 98 % O2 Device: None (Room air)    Vitals:    09/09/20  1754 09/25/20 0632   Weight: 70 kg (154 lb 5.2 oz) 71 kg (156 lb 8 oz)     Vital Signs with Ranges  Temp:  [97.6  F (36.4  C)] 97.6  F (36.4  C)  Pulse:  [80] 80  Resp:  [16] 16  BP: (99)/(67) 99/67  SpO2:  [98 %] 98 %  I/O last 3 completed shifts:  In: 1080 [P.O.:1080]  Out: -     Constitutional: Resting comfortably, alert but not overly conversive, oriented to self/location, NAD  Respiratory: CTAB, no wheeze/rales/rhonchi, no increased work of breathing  Cardiovascular: HRRR, soft SM most prominent along sternal border, no LE edema  GI: S, NT, ND, +BS  Skin/Integumen: warm/dry  Other:    Medications       aspirin  81 mg Oral Daily     atorvastatin  80 mg Oral At Bedtime     benztropine  0.25 mg Oral BID     docusate sodium  100 mg Oral BID     haloperidol  7 mg Oral BID     levothyroxine  88 mcg Oral Daily     memantine  5 mg Oral Daily     nicotine  1 patch Transdermal Daily     nicotine   Transdermal TID     polyethylene glycol  17 g Oral Daily     salmeterol  1 puff Inhalation BID     venlafaxine  75 mg Oral Daily with breakfast       Data   No lab results found in last 7 days.    No results found for this or any previous visit (from the past 24 hour(s)).

## 2020-10-17 PROCEDURE — 250N000013 HC RX MED GY IP 250 OP 250 PS 637: Performed by: INTERNAL MEDICINE

## 2020-10-17 PROCEDURE — 250N000013 HC RX MED GY IP 250 OP 250 PS 637: Performed by: PSYCHIATRY & NEUROLOGY

## 2020-10-17 PROCEDURE — 250N000013 HC RX MED GY IP 250 OP 250 PS 637: Performed by: NURSE PRACTITIONER

## 2020-10-17 PROCEDURE — 120N000001 HC R&B MED SURG/OB

## 2020-10-17 PROCEDURE — 250N000013 HC RX MED GY IP 250 OP 250 PS 637: Performed by: HOSPITALIST

## 2020-10-17 PROCEDURE — 99231 SBSQ HOSP IP/OBS SF/LOW 25: CPT | Performed by: HOSPITALIST

## 2020-10-17 RX ADMIN — ASPIRIN 81 MG: 81 TABLET, COATED ORAL at 09:14

## 2020-10-17 RX ADMIN — LEVOTHYROXINE SODIUM 88 MCG: 88 TABLET ORAL at 09:14

## 2020-10-17 RX ADMIN — DOCUSATE SODIUM 100 MG: 100 CAPSULE, LIQUID FILLED ORAL at 20:08

## 2020-10-17 RX ADMIN — OLANZAPINE 10 MG: 5 TABLET, ORALLY DISINTEGRATING ORAL at 18:48

## 2020-10-17 RX ADMIN — SALMETEROL XINAFOATE 1 PUFF: 50 POWDER, METERED ORAL; RESPIRATORY (INHALATION) at 09:20

## 2020-10-17 RX ADMIN — SALMETEROL XINAFOATE 1 PUFF: 50 POWDER, METERED ORAL; RESPIRATORY (INHALATION) at 20:09

## 2020-10-17 RX ADMIN — NICOTINE 1 PATCH: 21 PATCH, EXTENDED RELEASE TRANSDERMAL at 09:15

## 2020-10-17 RX ADMIN — HALOPERIDOL 7 MG: 5 TABLET ORAL at 20:08

## 2020-10-17 RX ADMIN — DOCUSATE SODIUM 100 MG: 100 CAPSULE, LIQUID FILLED ORAL at 09:14

## 2020-10-17 RX ADMIN — MEMANTINE 5 MG: 5 TABLET ORAL at 09:14

## 2020-10-17 RX ADMIN — Medication 0.25 MG: at 09:14

## 2020-10-17 RX ADMIN — VENLAFAXINE HYDROCHLORIDE 75 MG: 75 CAPSULE, EXTENDED RELEASE ORAL at 09:14

## 2020-10-17 RX ADMIN — POLYETHYLENE GLYCOL 3350 17 G: 17 POWDER, FOR SOLUTION ORAL at 09:15

## 2020-10-17 RX ADMIN — HALOPERIDOL 7 MG: 5 TABLET ORAL at 09:14

## 2020-10-17 RX ADMIN — ATORVASTATIN CALCIUM 80 MG: 40 TABLET, FILM COATED ORAL at 20:08

## 2020-10-17 RX ADMIN — Medication 0.25 MG: at 20:08

## 2020-10-17 ASSESSMENT — ACTIVITIES OF DAILY LIVING (ADL)
ADLS_ACUITY_SCORE: 13

## 2020-10-17 NOTE — PLAN OF CARE
DATE & TIME: 10/17/2020                      Cognitive Concerns/ Orientation : Alert to self and place   BEHAVIOR & AGGRESSION TOOL COLOR: Green   CIWA SCORE:NA    ABNL VS/O2: Done on days, only once per day.    MOBILITY: Ind   PAIN MANAGMENT: Denies   DIET: Regular   BOWEL/BLADDER: Continent.   ABNL LAB/BG: NA  DRAIN/DEVICES: NA   TELEMETRY RHYTHM: NA   SKIN: Intact   TESTS/PROCEDURES: NA   D/C DAY/GOALS/PLACE: Pending placement,  following.  OTHER IMPORTANT INFO: Court hold. Door alarm in place. Sitter outside room.

## 2020-10-17 NOTE — PLAN OF CARE
Cognitive Concerns/ Orientation : alert to self only.      BEHAVIOR & AGGRESSION TOOL COLOR: yellow--some agitation, stating that he wants his sister to come and pick him up.   PO Zyprexa given.    CIWA SCORE: n/a      ABNL VS/O2: VSS on RA  MOBILITY: Independent in the room, door alarm in place  PAIN MANAGMENT: denied pain   DIET: regular  BOWEL/BLADDER: continent.   ABNL LAB/BG: no new labs   DRAIN/DEVICES: n/a   TELEMETRY RHYTHM: n/a   SKIN: intact, tattoo.   TESTS/PROCEDURES: none pending  D/C DAY/GOALS/PLACE: pending placement.   OTHER IMPORTANT INFO: Court hold.

## 2020-10-17 NOTE — PROGRESS NOTES
[facility]  Hospitalist Progress Note    Assessment & Plan   John Juárez is a 54 year old male with PMHx of schizophrenia, hx of TBI, alcohol use disorder, COPD, hyperlipidemia and hypothyroidism who was admitted on 9/9/2020 for evaluation of aggressive behavior at his group home. His medical condition remains stable, though his group home is now unwilling to take him back. Hospitalization has been prolonged while arranging for new placement.     Neurocognitive disorder dt hx of TBI and alcohol use disorder  Schizophrenia  Had been residing in group home prior to admission.  Brought to hospital for evaluation of aggressive behaviors. Group home now unwilling to take him back. Has had numerous CODE 21s called this stay. Seen by psych, meds adjusted. Is currently under civil commitment and court hold through [facility] until 7/31/21.  -- last seen by psych on 9/29  -- conts on Cogentin 0.25mg BID, Haldol 7mg BID, Namenda 5mg daily, Effexor XR 75mg daily  -- PRN zyprexa available for agitation  -- monitor periodic EKGs to ensure QTc remains stable    Hyperlipidemia  Chronic and stable on aspirin and statin    COPD  Not O2 dependent.  Stable on salmeterol    Hypothyroidism  Chronic and stable on levothyroxine    Tobacco Use  Using nicotine patch    FEN: no IVFs, lytes stable, regular diet  DVT Prophylaxis: PCDs when in bed, encourage ambulation  Code Status: Full Code    Disposition: Discharge date unclear, pending placement. SW following.    Hermilo Sousa MD  Hospitalist    Interval History    No acute issues reported.      -Data reviewed today: I reviewed all new labs and imaging results over the last 24 hours. I personally reviewed no images or EKG's today.    Physical Exam   Temp: 98.2  F (36.8  C) Temp src: Oral BP: 103/77 Pulse: 77   Resp: 16 SpO2: 96 % O2 Device: None (Room air)    Vitals:    09/09/20 1754 09/25/20 0632   Weight: 70 kg (154 lb 5.2 oz) 71 kg (156 lb 8 oz)      Vital Signs with Ranges  Temp:  [98.2  F (36.8  C)] 98.2  F (36.8  C)  Pulse:  [77] 77  Resp:  [16] 16  BP: (103)/(77) 103/77  SpO2:  [96 %] 96 %  I/O last 3 completed shifts:  In: 720 [P.O.:720]  Out: -     Constitutional: Resting comfortably, alert but not overly conversive, oriented to self/location, NAD  Respiratory: CTAB, no wheeze/rales/rhonchi, no increased work of breathing  Cardiovascular: HRRR, soft SM most prominent along sternal border, no LE edema  GI: S, NT, ND, +BS  Skin/Integumen: warm/dry  Other:    Medications       aspirin  81 mg Oral Daily     atorvastatin  80 mg Oral At Bedtime     benztropine  0.25 mg Oral BID     docusate sodium  100 mg Oral BID     haloperidol  7 mg Oral BID     levothyroxine  88 mcg Oral Daily     memantine  5 mg Oral Daily     nicotine  1 patch Transdermal Daily     nicotine   Transdermal TID     polyethylene glycol  17 g Oral Daily     salmeterol  1 puff Inhalation BID     venlafaxine  75 mg Oral Daily with breakfast       Data   No lab results found in last 7 days.    No results found for this or any previous visit (from the past 24 hour(s)).

## 2020-10-18 PROCEDURE — 250N000013 HC RX MED GY IP 250 OP 250 PS 637: Performed by: INTERNAL MEDICINE

## 2020-10-18 PROCEDURE — 250N000013 HC RX MED GY IP 250 OP 250 PS 637: Performed by: NURSE PRACTITIONER

## 2020-10-18 PROCEDURE — 99231 SBSQ HOSP IP/OBS SF/LOW 25: CPT | Performed by: HOSPITALIST

## 2020-10-18 PROCEDURE — 250N000013 HC RX MED GY IP 250 OP 250 PS 637: Performed by: HOSPITALIST

## 2020-10-18 PROCEDURE — 120N000001 HC R&B MED SURG/OB

## 2020-10-18 PROCEDURE — 250N000013 HC RX MED GY IP 250 OP 250 PS 637: Performed by: PSYCHIATRY & NEUROLOGY

## 2020-10-18 RX ADMIN — DOCUSATE SODIUM 100 MG: 100 CAPSULE, LIQUID FILLED ORAL at 09:16

## 2020-10-18 RX ADMIN — DOCUSATE SODIUM 100 MG: 100 CAPSULE, LIQUID FILLED ORAL at 20:12

## 2020-10-18 RX ADMIN — HALOPERIDOL 7 MG: 5 TABLET ORAL at 09:16

## 2020-10-18 RX ADMIN — SALMETEROL XINAFOATE 1 PUFF: 50 POWDER, METERED ORAL; RESPIRATORY (INHALATION) at 20:11

## 2020-10-18 RX ADMIN — LEVOTHYROXINE SODIUM 88 MCG: 88 TABLET ORAL at 09:16

## 2020-10-18 RX ADMIN — POLYETHYLENE GLYCOL 3350 17 G: 17 POWDER, FOR SOLUTION ORAL at 09:16

## 2020-10-18 RX ADMIN — Medication 0.25 MG: at 09:16

## 2020-10-18 RX ADMIN — ATORVASTATIN CALCIUM 80 MG: 40 TABLET, FILM COATED ORAL at 20:12

## 2020-10-18 RX ADMIN — ASPIRIN 81 MG: 81 TABLET, COATED ORAL at 09:16

## 2020-10-18 RX ADMIN — HALOPERIDOL 7 MG: 5 TABLET ORAL at 20:12

## 2020-10-18 RX ADMIN — MEMANTINE 5 MG: 5 TABLET ORAL at 09:16

## 2020-10-18 RX ADMIN — VENLAFAXINE HYDROCHLORIDE 75 MG: 75 CAPSULE, EXTENDED RELEASE ORAL at 09:16

## 2020-10-18 RX ADMIN — SALMETEROL XINAFOATE 1 PUFF: 50 POWDER, METERED ORAL; RESPIRATORY (INHALATION) at 09:16

## 2020-10-18 RX ADMIN — Medication 0.25 MG: at 20:12

## 2020-10-18 RX ADMIN — NICOTINE 1 PATCH: 21 PATCH, EXTENDED RELEASE TRANSDERMAL at 09:18

## 2020-10-18 ASSESSMENT — ACTIVITIES OF DAILY LIVING (ADL)
ADLS_ACUITY_SCORE: 13
ADLS_ACUITY_SCORE: 12
ADLS_ACUITY_SCORE: 12
ADLS_ACUITY_SCORE: 13

## 2020-10-18 NOTE — PROGRESS NOTES
[facility]  Hospitalist Progress Note    Assessment & Plan   John Juárez is a 54 year old male with PMHx of schizophrenia, hx of TBI, alcohol use disorder, COPD, hyperlipidemia and hypothyroidism who was admitted on 9/9/2020 for evaluation of aggressive behavior at his group home. His medical condition remains stable, though his group home is now unwilling to take him back. Hospitalization has been prolonged while arranging for new placement.     Neurocognitive disorder dt hx of TBI and alcohol use disorder  Schizophrenia  Had been residing in group home prior to admission.  Brought to hospital for evaluation of aggressive behaviors. Group home now unwilling to take him back. Has had numerous CODE 21s called this stay. Seen by psych, meds adjusted. Is currently under civil commitment and court hold through [facility] until 7/31/21.  -- last seen by psych on 9/29  -- conts on Cogentin 0.25mg BID, Haldol 7mg BID, Namenda 5mg daily, Effexor XR 75mg daily  -- PRN zyprexa available for agitation  -- monitor periodic EKGs to ensure QTc remains stable    Hyperlipidemia  Chronic and stable on aspirin and statin    COPD  Not O2 dependent.  Stable on salmeterol    Hypothyroidism  Chronic and stable on levothyroxine    Tobacco Use  Using nicotine patch    FEN: no IVFs, lytes stable, regular diet  DVT Prophylaxis: PCDs when in bed, encourage ambulation  Code Status: Full Code    Disposition: Discharge date unclear, pending placement. SW following.    Hermilo Sousa MD  Hospitalist    Interval History    No acute issues reported.    Patient asked where he was, and upon telling him that he is at [facility], he stated he did not think he was there.  He says he is hungry and wants to eat, nursing staff just ordered his lunch.    -Data reviewed today: I reviewed all new labs and imaging results over the last 24 hours. I personally reviewed no images or EKG's today.    Physical Exam                       Vitals:    09/09/20 1754 09/25/20 0632   Weight: 70 kg (154 lb 5.2 oz) 71 kg (156 lb 8 oz)     Vital Signs with Ranges     I/O last 3 completed shifts:  In: 1320 [P.O.:1320]  Out: -     Constitutional: Resting comfortably, alert but not overly conversive, oriented to self/location, NAD  Respiratory: CTAB, no wheeze/rales/rhonchi, no increased work of breathing  Cardiovascular: HRRR, soft SM most prominent along sternal border, no LE edema  GI: S, NT, ND, +BS  Skin/Integumen: warm/dry  Other:    Medications       aspirin  81 mg Oral Daily     atorvastatin  80 mg Oral At Bedtime     benztropine  0.25 mg Oral BID     docusate sodium  100 mg Oral BID     haloperidol  7 mg Oral BID     levothyroxine  88 mcg Oral Daily     memantine  5 mg Oral Daily     nicotine  1 patch Transdermal Daily     nicotine   Transdermal TID     polyethylene glycol  17 g Oral Daily     salmeterol  1 puff Inhalation BID     venlafaxine  75 mg Oral Daily with breakfast       Data   No lab results found in last 7 days.    No results found for this or any previous visit (from the past 24 hour(s)).

## 2020-10-18 NOTE — PLAN OF CARE
DATE & TIME: 10/18/2020                      Cognitive Concerns/ Orientation : Alert to self and place   BEHAVIOR & AGGRESSION TOOL COLOR: Green   CIWA SCORE:NA    ABNL VS/O2: Done on days, only once per day.    MOBILITY: Ind   PAIN MANAGMENT: Denies   DIET: Regular   BOWEL/BLADDER: Continent.   ABNL LAB/BG: NA  DRAIN/DEVICES: NA   TELEMETRY RHYTHM: NA   SKIN: Intact   TESTS/PROCEDURES: NA   D/C DAY/GOALS/PLACE: Pending placement,  following.  OTHER IMPORTANT INFO: Court hold. Door alarm in place.

## 2020-10-18 NOTE — PLAN OF CARE
Cognitive Concerns/ Orientation : Alert to self and place   BEHAVIOR & AGGRESSION TOOL COLOR: Green (has been red for flight risk)  CIWA SCORE:NA    ABNL VS/O2: VSS on r/a  MOBILITY: Ind   PAIN MANAGMENT: Denies   DIET: Regular   BOWEL/BLADDER: Continent.   ABNL LAB/BG: no new labs  DRAIN/DEVICES: None present  TELEMETRY RHYTHM: NA   SKIN: Intact   TESTS/PROCEDURES: None pending  D/C DAY/GOALS/PLACE: Pending placement,  following.  OTHER IMPORTANT INFO: Court hold. Door alarm in place.

## 2020-10-19 PROCEDURE — 99231 SBSQ HOSP IP/OBS SF/LOW 25: CPT | Performed by: INTERNAL MEDICINE

## 2020-10-19 PROCEDURE — 120N000001 HC R&B MED SURG/OB

## 2020-10-19 PROCEDURE — 250N000013 HC RX MED GY IP 250 OP 250 PS 637: Performed by: PSYCHIATRY & NEUROLOGY

## 2020-10-19 PROCEDURE — 250N000013 HC RX MED GY IP 250 OP 250 PS 637: Performed by: HOSPITALIST

## 2020-10-19 PROCEDURE — 250N000013 HC RX MED GY IP 250 OP 250 PS 637: Performed by: INTERNAL MEDICINE

## 2020-10-19 RX ADMIN — Medication 0.25 MG: at 09:43

## 2020-10-19 RX ADMIN — DOCUSATE SODIUM 100 MG: 100 CAPSULE, LIQUID FILLED ORAL at 09:42

## 2020-10-19 RX ADMIN — HALOPERIDOL 7 MG: 5 TABLET ORAL at 09:42

## 2020-10-19 RX ADMIN — SALMETEROL XINAFOATE 1 PUFF: 50 POWDER, METERED ORAL; RESPIRATORY (INHALATION) at 09:46

## 2020-10-19 RX ADMIN — ASPIRIN 81 MG: 81 TABLET, COATED ORAL at 09:43

## 2020-10-19 RX ADMIN — HALOPERIDOL 7 MG: 5 TABLET ORAL at 20:51

## 2020-10-19 RX ADMIN — ATORVASTATIN CALCIUM 80 MG: 40 TABLET, FILM COATED ORAL at 20:51

## 2020-10-19 RX ADMIN — MEMANTINE 5 MG: 5 TABLET ORAL at 09:42

## 2020-10-19 RX ADMIN — Medication 0.25 MG: at 20:51

## 2020-10-19 RX ADMIN — LEVOTHYROXINE SODIUM 88 MCG: 88 TABLET ORAL at 09:42

## 2020-10-19 RX ADMIN — VENLAFAXINE HYDROCHLORIDE 75 MG: 75 CAPSULE, EXTENDED RELEASE ORAL at 09:41

## 2020-10-19 RX ADMIN — POLYETHYLENE GLYCOL 3350 17 G: 17 POWDER, FOR SOLUTION ORAL at 09:41

## 2020-10-19 RX ADMIN — DOCUSATE SODIUM 100 MG: 100 CAPSULE, LIQUID FILLED ORAL at 20:51

## 2020-10-19 ASSESSMENT — ACTIVITIES OF DAILY LIVING (ADL)
ADLS_ACUITY_SCORE: 13

## 2020-10-19 NOTE — PROGRESS NOTES
[facility]    HOSPITALIST PROGRESS NOTE :   --------------------------------------------------    Date of Admission:  9/9/2020    Cumulative Summary: John Juárez is a 54 year old male with PMHx of schizophrenia, hx of TBI, alcohol use disorder, COPD, hyperlipidemia and hypothyroidism who was admitted on 9/9/2020 for evaluation of aggressive behavior at his group home. His medical condition remains stable, though his group home is now unwilling to take him back. Hospitalization has been prolonged while arranging for new placement.     Assessment & Plan     Neurocognitive disorder dt hx of TBI and alcohol use disorder  Schizophrenia  Had been residing in group home prior to admission.  Brought to hospital for evaluation of aggressive behaviors. Group home now unwilling to take him back. Has had numerous CODE 21s called this stay. Seen by psych, meds adjusted. Is currently under civil commitment and court hold through [facility] until 7/31/21.  -- last seen by psych on 9/29  -- Continue on Cogentin 0.25mg BID, Haldol 7mg BID, Namenda 5mg daily, Effexor XR 75mg daily  -- PRN zyprexa available for agitation  -- monitor periodic EKGs to ensure QTc remains stable     Hyperlipidemia  --Chronic and stable on aspirin and statin     COPD; Not O2 dependent.  --Stable on salmeterol     Hypothyroidism  --Chronic and stable on levothyroxine     Tobacco Use  --Using nicotine patch    Diet: Room Service  Combination Diet Regular Diet Adult; Safe Tray - NO utensils    Valentin Catheter: not present  DVT Prophylaxis: Pneumatic Compression Devices  Code Status: Full Code    The patient's care was discussed with the Patient.    Disposition Plan   Expected discharge: discharge date is unclear , pending safe disposition plan , SW are following     Carmen Douglass MD, FACP  Text Page (7am -  6pm)    ----------------------------------------------------------------------------------------------------------------------    Interval History   Patient care was assumed this morning, patient was seen and examined, comfortable lying in bed, gets oriented once told that he was in the hospital, was cooperative with examination.    -Data reviewed today: I reviewed all new labs and imaging results over the last 24 hours.    I personally reviewed no images or EKG's today.    Physical Exam   Temp: 97.6  F (36.4  C) Temp src: Oral BP: 101/66 Pulse: 84   Resp: 16 SpO2: 99 % O2 Device: None (Room air)    Vitals:    09/09/20 1754 09/25/20 0632   Weight: 70 kg (154 lb 5.2 oz) 71 kg (156 lb 8 oz)     Vital Signs with Ranges  Temp:  [97.6  F (36.4  C)] 97.6  F (36.4  C)  Pulse:  [70-84] 84  Resp:  [16] 16  BP: ()/(66) 101/66  SpO2:  [96 %-99 %] 99 %  No intake/output data recorded.    GENERAL: Alert , awake and oriented to self , NAD. Not overly conversive.   HEENT: Normocephalic. EOMI. No icterus or injection. Nares normal.   LUNGS: Clear to auscultation. No dyspnea at rest.   HEART: Regular rate. Extremities perfused.   ABDOMEN: Soft, nontender, and nondistended. Positive bowel sounds.   EXTREMITIES: No LE edema noted.   NEUROLOGIC: Moves extremities x4 on command. No acute focal neurologic abnormalities noted.     Medications       aspirin  81 mg Oral Daily     atorvastatin  80 mg Oral At Bedtime     benztropine  0.25 mg Oral BID     docusate sodium  100 mg Oral BID     haloperidol  7 mg Oral BID     levothyroxine  88 mcg Oral Daily     memantine  5 mg Oral Daily     nicotine  1 patch Transdermal Daily     nicotine   Transdermal TID     polyethylene glycol  17 g Oral Daily     salmeterol  1 puff Inhalation BID     venlafaxine  75 mg Oral Daily with breakfast       Data   No lab results found in last 7 days.    Imaging:   No results found for this or any previous visit (from the past 24 hour(s)).

## 2020-10-19 NOTE — PLAN OF CARE
DATE & TIME: 10/19/2020 0597-9221  NO CHANGES TODAY               Cognitive Concerns/ Orientation : Alert and Oriented to self and place, calm and cooperative this shift, no elopement attempts    BEHAVIOR & AGGRESSION TOOL COLOR: Green (has been red for flight risk)  CIWA SCORE:NA    ABNL VS/O2: VSS on RA  MOBILITY: Ind   PAIN MANAGMENT: Denies   DIET: Regular   BOWEL/BLADDER: Continent.   ABNL LAB/BG: no new labs  DRAIN/DEVICES: None present  TELEMETRY RHYTHM: NA   SKIN: Intact   TESTS/PROCEDURES: None pending  D/C DAY/GOALS/PLACE: Pending placement,  following.  OTHER IMPORTANT INFO: Court hold. Door alarm in place.

## 2020-10-19 NOTE — PLAN OF CARE
DATE & TIME: 10/19/2020   NO CHANGES TODAY               Cognitive Concerns/ Orientation : Alert and Oriented to self and place   BEHAVIOR & AGGRESSION TOOL COLOR: Green (has been red for flight risk)  CIWA SCORE:NA    ABNL VS/O2: VSS on RA  MOBILITY: Ind   PAIN MANAGMENT: Denies   DIET: Regular   BOWEL/BLADDER: Continent.   ABNL LAB/BG: no new labs  DRAIN/DEVICES: None present  TELEMETRY RHYTHM: NA   SKIN: Intact   TESTS/PROCEDURES: None pending  D/C DAY/GOALS/PLACE: Pending placement,  following.  OTHER IMPORTANT INFO: Court hold. Door alarm in place.

## 2020-10-20 PROCEDURE — 99231 SBSQ HOSP IP/OBS SF/LOW 25: CPT | Performed by: INTERNAL MEDICINE

## 2020-10-20 PROCEDURE — 120N000001 HC R&B MED SURG/OB

## 2020-10-20 PROCEDURE — 250N000013 HC RX MED GY IP 250 OP 250 PS 637: Performed by: HOSPITALIST

## 2020-10-20 PROCEDURE — 250N000013 HC RX MED GY IP 250 OP 250 PS 637: Performed by: INTERNAL MEDICINE

## 2020-10-20 PROCEDURE — 250N000013 HC RX MED GY IP 250 OP 250 PS 637: Performed by: PSYCHIATRY & NEUROLOGY

## 2020-10-20 RX ADMIN — DOCUSATE SODIUM 100 MG: 100 CAPSULE, LIQUID FILLED ORAL at 20:36

## 2020-10-20 RX ADMIN — MEMANTINE 5 MG: 5 TABLET ORAL at 09:01

## 2020-10-20 RX ADMIN — HALOPERIDOL 7 MG: 5 TABLET ORAL at 09:01

## 2020-10-20 RX ADMIN — Medication 0.25 MG: at 20:36

## 2020-10-20 RX ADMIN — DOCUSATE SODIUM 100 MG: 100 CAPSULE, LIQUID FILLED ORAL at 09:01

## 2020-10-20 RX ADMIN — POLYETHYLENE GLYCOL 3350 17 G: 17 POWDER, FOR SOLUTION ORAL at 09:01

## 2020-10-20 RX ADMIN — Medication 0.25 MG: at 09:01

## 2020-10-20 RX ADMIN — ATORVASTATIN CALCIUM 80 MG: 40 TABLET, FILM COATED ORAL at 20:36

## 2020-10-20 RX ADMIN — HALOPERIDOL 7 MG: 5 TABLET ORAL at 20:36

## 2020-10-20 RX ADMIN — LEVOTHYROXINE SODIUM 88 MCG: 88 TABLET ORAL at 09:00

## 2020-10-20 RX ADMIN — ASPIRIN 81 MG: 81 TABLET, COATED ORAL at 09:01

## 2020-10-20 RX ADMIN — VENLAFAXINE HYDROCHLORIDE 75 MG: 75 CAPSULE, EXTENDED RELEASE ORAL at 09:01

## 2020-10-20 RX ADMIN — SALMETEROL XINAFOATE 1 PUFF: 50 POWDER, METERED ORAL; RESPIRATORY (INHALATION) at 09:14

## 2020-10-20 RX ADMIN — SALMETEROL XINAFOATE 1 PUFF: 50 POWDER, METERED ORAL; RESPIRATORY (INHALATION) at 20:36

## 2020-10-20 ASSESSMENT — ACTIVITIES OF DAILY LIVING (ADL)
ADLS_ACUITY_SCORE: 13

## 2020-10-20 NOTE — PLAN OF CARE
DATE & TIME: 10/20/2020; 7029-6383  Cognitive Concerns/ Orientation : alert to self and place.  BEHAVIOR & AGGRESSION TOOL COLOR: Red due to past events, has been calm for shift  CIWA SCORE: N/A       ABNL VS/O2: VSS on RA  MOBILITY: independent in the room, door alarm in place  PAIN MANAGMENT: denies  DIET: Regular/safe tray  BOWEL/BLADDER: continent   ABNL LAB/BG: no new labs   DRAIN/DEVICES: no IV access   TELEMETRY RHYTHM: N/A  SKIN: Scattered bruising; intact   TESTS/PROCEDURES: N/A  D/C DAY/GOALS/PLACE: pending placement   OTHER IMPORTANT INFO: on court hold; door alarm in place  MD/RN ROUNDING SIGNED OFF D/E SHIFT: N/A  COMMIT TO SIT DONE AND SIGNED OFF: yes

## 2020-10-20 NOTE — PLAN OF CARE
DATE & TIME: 10/20/2020; 0700-153  Cognitive Concerns/ Orientation : alert to self, place inconsistent  BEHAVIOR & AGGRESSION TOOL COLOR: Red due to past events, has been calm for shift  CIWA SCORE: N/A       ABNL VS/O2: VSS on RA  MOBILITY: independent in the room, door alarm in place , opened door once but easily redirected   PAIN MANAGMENT: denies  DIET: Regular/safe tray  BOWEL/BLADDER: continent   ABNL LAB/BG: no new labs   DRAIN/DEVICES: no IV access   TELEMETRY RHYTHM: N/A  SKIN: Scattered bruising; intact   TESTS/PROCEDURES: N/A  D/C DAY/GOALS/PLACE: pending placement   OTHER IMPORTANT INFO: on court hold; door alarm in place  MD/RN ROUNDING SIGNED OFF D/E SHIFT: N/A  COMMIT TO SIT DONE AND SIGNED OFF: yes

## 2020-10-20 NOTE — PROGRESS NOTES
[facility]    HOSPITALIST PROGRESS NOTE :   --------------------------------------------------    Date of Admission:  9/9/2020    Cumulative Summary: John Juárez is a 54 year old male with PMHx of schizophrenia, hx of TBI, alcohol use disorder, COPD, hyperlipidemia and hypothyroidism who was admitted on 9/9/2020 for evaluation of aggressive behavior at his group home. His medical condition remains stable, though his group home is now unwilling to take him back. Hospitalization has been prolonged while arranging for new placement.     Assessment & Plan     Neurocognitive disorder dt hx of TBI and alcohol use disorder  Schizophrenia  Had been residing in group home prior to admission.  Brought to hospital for evaluation of aggressive behaviors. Group home now unwilling to take him back. Has had numerous CODE 21s called this stay. Seen by psych, meds adjusted. Is currently under civil commitment and court hold through [facility] until 7/31/21.    -- Patient has been last evaluated by psych on September 29.  -- Continue patient on Cogentin 0.25 mg twice daily, Haldol 7 mg twice daily, Namenda 5 mg daily and Effexor 75 mg p.o. daily.  -- PRN zyprexa available for agitation  -- monitor periodic EKGs to ensure QTc remains stable     Hyperlipidemia  -- Chronic and stable on aspirin and statin     COPD; Not O2 dependent.  -- Stable on salmeterol     Hypothyroidism  --Chronic and stable on levothyroxine     Tobacco Use  --Using nicotine patch    Diet: Room Service  Combination Diet Regular Diet Adult; Safe Tray - NO utensils    Valentin Catheter: not present  DVT Prophylaxis: Pneumatic Compression Devices  Code Status: Full Code    The patient's care was discussed with the Patient.    Disposition Plan   Expected discharge: discharge date is unclear , pending safe disposition plan , SW are following     Carmen Douglass MD, FACP  Text Page (7am -  6pm)    ----------------------------------------------------------------------------------------------------------------------    Interval History    Patient was seen and examined, sitting in bed, eating breakfast, feeling well, denying any complaints, no nausea no vomiting no chest pain or shortness of breath, encouraged him to increase his mobilization while he is in the hospital.    -Data reviewed today: I reviewed all new labs and imaging results over the last 24 hours.    I personally reviewed no images or EKG's today.    Physical Exam   Temp: 97.3  F (36.3  C) Temp src: Oral BP: 100/66 Pulse: 75   Resp: 18 SpO2: 99 % O2 Device: None (Room air)    Vitals:    09/09/20 1754 09/25/20 0632   Weight: 70 kg (154 lb 5.2 oz) 71 kg (156 lb 8 oz)     Vital Signs with Ranges  Temp:  [97.3  F (36.3  C)-98  F (36.7  C)] 97.3  F (36.3  C)  Pulse:  [64-75] 75  Resp:  [18] 18  BP: (100-107)/(66-73) 100/66  SpO2:  [99 %] 99 %  I/O last 3 completed shifts:  In: 730 [P.O.:730]  Out: -     GENERAL: Alert , awake and oriented to self , NAD. Not overly conversive.   HEENT: Normocephalic. EOMI. No icterus or injection. Nares normal.   LUNGS: Clear to auscultation. No dyspnea at rest.   HEART: Regular rate. Extremities perfused.   ABDOMEN: Soft, nontender, and nondistended. Positive bowel sounds.   EXTREMITIES: No LE edema noted.   NEUROLOGIC: Moves extremities x4 on command. No acute focal neurologic abnormalities noted.     Medications       aspirin  81 mg Oral Daily     atorvastatin  80 mg Oral At Bedtime     benztropine  0.25 mg Oral BID     docusate sodium  100 mg Oral BID     haloperidol  7 mg Oral BID     levothyroxine  88 mcg Oral Daily     memantine  5 mg Oral Daily     nicotine  1 patch Transdermal Daily     nicotine   Transdermal TID     polyethylene glycol  17 g Oral Daily     salmeterol  1 puff Inhalation BID     venlafaxine  75 mg Oral Daily with breakfast       Data   No lab results found in last 7 days.    Imaging:    No results found for this or any previous visit (from the past 24 hour(s)).

## 2020-10-20 NOTE — PROGRESS NOTES
Care Management Follow Up Note    Length of Stay (days) 31    Patient plan of care discussed at Interdisciplinary Rounds: yes  Expected Discharge Date: (Pending placement)  Concerns to be Addressed:         Anticipated Discharge Disposition:    Anticipated Discharge Services:    Anticipated Discharge DME:      Plan:  Patient will receive a phone call on Thursday 10/22 at 1000 to complete his [place] [employer] assessment.  This is needed for funding related to his placement at [facility].    ASIM CastilloSW

## 2020-10-20 NOTE — PLAN OF CARE
DATE & TIME: 10/19/2020; 4606-9804  Cognitive Concerns/ Orientation : A & O x 2; calm and cooperative; No elopement attempts on this shift  BEHAVIOR & AGGRESSION TOOL COLOR: Red due to elopement (has been Green on this shift)  CIWA SCORE: N/A   ABNL VS/O2: VSS on RA  MOBILITY: independent in the room   PAIN MANAGMENT: denies   DIET: Regular/safe tray; tolerating well   BOWEL/BLADDER: BS active x 4; continent   ABNL LAB/BG: no new labs   DRAIN/DEVICES: no IV access   TELEMETRY RHYTHM: N/A  SKIN: Scattered bruising; intact   TESTS/PROCEDURES: N/A  D/C DAY/GOALS/PLACE: pending placement   OTHER IMPORTANT INFO: on court hold; door alarm in place; Nursing will continue to monitor and assess    MD/RN ROUNDING SIGNED OFF D/E SHIFT: N/A  COMMIT TO SIT DONE AND SIGNED OFF: COMPLETE

## 2020-10-21 PROCEDURE — 120N000001 HC R&B MED SURG/OB

## 2020-10-21 PROCEDURE — 250N000013 HC RX MED GY IP 250 OP 250 PS 637: Performed by: NURSE PRACTITIONER

## 2020-10-21 PROCEDURE — 250N000013 HC RX MED GY IP 250 OP 250 PS 637: Performed by: INTERNAL MEDICINE

## 2020-10-21 PROCEDURE — 250N000013 HC RX MED GY IP 250 OP 250 PS 637: Performed by: HOSPITALIST

## 2020-10-21 PROCEDURE — 99231 SBSQ HOSP IP/OBS SF/LOW 25: CPT | Performed by: INTERNAL MEDICINE

## 2020-10-21 PROCEDURE — 250N000013 HC RX MED GY IP 250 OP 250 PS 637: Performed by: PSYCHIATRY & NEUROLOGY

## 2020-10-21 RX ADMIN — VENLAFAXINE HYDROCHLORIDE 75 MG: 75 CAPSULE, EXTENDED RELEASE ORAL at 09:46

## 2020-10-21 RX ADMIN — ASPIRIN 81 MG: 81 TABLET, COATED ORAL at 09:46

## 2020-10-21 RX ADMIN — POLYETHYLENE GLYCOL 3350 17 G: 17 POWDER, FOR SOLUTION ORAL at 09:45

## 2020-10-21 RX ADMIN — HALOPERIDOL 7 MG: 5 TABLET ORAL at 09:46

## 2020-10-21 RX ADMIN — Medication 0.25 MG: at 20:44

## 2020-10-21 RX ADMIN — LEVOTHYROXINE SODIUM 88 MCG: 88 TABLET ORAL at 09:46

## 2020-10-21 RX ADMIN — Medication 0.25 MG: at 09:46

## 2020-10-21 RX ADMIN — ATORVASTATIN CALCIUM 80 MG: 40 TABLET, FILM COATED ORAL at 20:43

## 2020-10-21 RX ADMIN — NICOTINE 1 PATCH: 21 PATCH, EXTENDED RELEASE TRANSDERMAL at 09:49

## 2020-10-21 RX ADMIN — DOCUSATE SODIUM 100 MG: 100 CAPSULE, LIQUID FILLED ORAL at 20:44

## 2020-10-21 RX ADMIN — SALMETEROL XINAFOATE 1 PUFF: 50 POWDER, METERED ORAL; RESPIRATORY (INHALATION) at 09:50

## 2020-10-21 RX ADMIN — HALOPERIDOL 7 MG: 5 TABLET ORAL at 20:44

## 2020-10-21 RX ADMIN — DOCUSATE SODIUM 100 MG: 100 CAPSULE, LIQUID FILLED ORAL at 09:46

## 2020-10-21 RX ADMIN — MEMANTINE 5 MG: 5 TABLET ORAL at 09:46

## 2020-10-21 RX ADMIN — SALMETEROL XINAFOATE 1 PUFF: 50 POWDER, METERED ORAL; RESPIRATORY (INHALATION) at 20:44

## 2020-10-21 ASSESSMENT — ACTIVITIES OF DAILY LIVING (ADL)
ADLS_ACUITY_SCORE: 13

## 2020-10-21 NOTE — PLAN OF CARE
Pt A&O to self and place (at times), disoriented to situation and time. VSS on RA. Up indep in room, door alarm in place. No IV access, MD aware. Awaiting placement. Continue to monitor.

## 2020-10-21 NOTE — PLAN OF CARE
DATE & TIME: 10/21 1668-1768      Cognitive Concerns/ Orientation : A&O x self only, quiet, calm and cooperative  BEHAVIOR & AGGRESSION TOOL COLOR: Green     ABNL VS/O2: VSS on RA  MOBILITY: Independent in room  PAIN MANAGMENT: Denies pain  DIET: Regular diet, good appetite  BOWEL/BLADDER: Continent  SKIN: Scattered bruises and scabs  TESTS/PROCEDURES: N/a  D/C DAY/GOALS/PLACE: Pending placement  OTHER IMPORTANT INFO: On court hold. Door alarm in place.

## 2020-10-21 NOTE — PROGRESS NOTES
[facility]    HOSPITALIST PROGRESS NOTE :   --------------------------------------------------    Date of Admission:  9/9/2020    Cumulative Summary: John Juárez is a 54 year old male with PMHx of schizophrenia, hx of TBI, alcohol use disorder, COPD, hyperlipidemia and hypothyroidism who was admitted on 9/9/2020 for evaluation of aggressive behavior at his group home. His medical condition remains stable, though his group home is now unwilling to take him back. Hospitalization has been prolonged while arranging for new placement.     Assessment & Plan     Neurocognitive disorder dt hx of TBI and alcohol use disorder  Schizophrenia  Had been residing in group home prior to admission.  Brought to hospital for evaluation of aggressive behaviors. Group home now unwilling to take him back. Has had numerous CODE 21s called this stay. Seen by psych, meds adjusted. Is currently under civil commitment and court hold through [facility] until 7/31/21.    -- Patient has been last evaluated by psych on September 29.  -- Continue patient on Cogentin 0.25 mg twice daily, Haldol 7 mg twice daily, Namenda 5 mg daily and Effexor 75 mg p.o. daily.  -- PRN zyprexa available for agitation  -- monitor periodic EKGs to ensure QTc remains stable     Hyperlipidemia  -- Chronic and stable on aspirin and statin     COPD; Not O2 dependent.  -- Stable on salmeterol     Hypothyroidism  --Chronic and stable on levothyroxine     Tobacco Use  --Using nicotine patch    Diet: Room Service  Combination Diet Regular Diet Adult; Safe Tray - NO utensils    Valentin Catheter: not present  DVT Prophylaxis: Pneumatic Compression Devices  Code Status: Full Code    The patient's care was discussed with the Patient.    Disposition Plan   Expected discharge: discharge date is unclear , pending safe disposition plan , SW are following , he is planned to receive the call tomorrow .    Carmen Douglass MD, FACP  Text Page (7am -  6pm)    ----------------------------------------------------------------------------------------------------------------------    Interval History    Patient was seen and examined, sitting in bed, feeling well , denying any complaints.    -Data reviewed today: I reviewed all new labs and imaging results over the last 24 hours.    I personally reviewed no images or EKG's today.    Physical Exam   Temp: 98.1  F (36.7  C) Temp src: Oral BP: 101/69 Pulse: 81   Resp: 18 SpO2: 99 % O2 Device: None (Room air)    Vitals:    09/09/20 1754 09/25/20 0632   Weight: 70 kg (154 lb 5.2 oz) 71 kg (156 lb 8 oz)     Vital Signs with Ranges  Temp:  [98.1  F (36.7  C)] 98.1  F (36.7  C)  Pulse:  [79-81] 81  Resp:  [18] 18  BP: (101-102)/(69) 101/69  SpO2:  [99 %] 99 %  I/O last 3 completed shifts:  In: 490 [P.O.:490]  Out: -     GENERAL: Alert , awake and oriented to self , NAD. Not overly conversive.   HEENT: Normocephalic. EOMI. No icterus or injection. Nares normal.   LUNGS: Clear to auscultation. No dyspnea at rest.   HEART: Regular rate. Extremities perfused.   ABDOMEN: Soft, nontender, and nondistended. Positive bowel sounds.   EXTREMITIES: No LE edema noted.   NEUROLOGIC: Moves extremities x4 on command. No acute focal neurologic abnormalities noted.     Medications       aspirin  81 mg Oral Daily     atorvastatin  80 mg Oral At Bedtime     benztropine  0.25 mg Oral BID     docusate sodium  100 mg Oral BID     haloperidol  7 mg Oral BID     levothyroxine  88 mcg Oral Daily     memantine  5 mg Oral Daily     nicotine  1 patch Transdermal Daily     nicotine   Transdermal TID     polyethylene glycol  17 g Oral Daily     salmeterol  1 puff Inhalation BID     venlafaxine  75 mg Oral Daily with breakfast       Data   No lab results found in last 7 days.    Imaging:   No results found for this or any previous visit (from the past 24 hour(s)).

## 2020-10-21 NOTE — PLAN OF CARE
DATE & TIME: 10/20/20, 6760 - 3751   Cognitive Concerns/ Orientation : A&O x self only   BEHAVIOR & AGGRESSION TOOL COLOR: Green   ABNL VS/O2: Daily vital signs  MOBILITY: Independent in room  PAIN MANAGMENT: Denied  DIET: Regular  BOWEL/BLADDER: Continent  ABNL LAB/BG: N/a  DRAIN/DEVICES: None  TELEMETRY RHYTHM: N/a  SKIN: Scattered bruises, intact  TESTS/PROCEDURES: N/a  D/C DAY/GOALS/PLACE: Pending placement  OTHER IMPORTANT INFO: On court hold. Door alarm in place  MD/RN ROUNDING SIGNED OFF D/E SHIFT: N/a  COMMIT TO SIT DONE AND SIGNED OFF Yes

## 2020-10-21 NOTE — PLAN OF CARE
Cognitive Concerns/ Orientation : A&O x self only, quiet, calm and cooperative  BEHAVIOR & AGGRESSION TOOL COLOR: Green     ABNL VS/O2: VSS  MOBILITY: Independent in room  PAIN MANAGMENT: Denied  DIET: Regular  BOWEL/BLADDER: Continent  SKIN: Scattered bruises and scabs  TESTS/PROCEDURES: N/a  D/C DAY/GOALS/PLACE: Pending placement  OTHER IMPORTANT INFO: On court hold. Door alarm in place.

## 2020-10-22 PROCEDURE — 250N000013 HC RX MED GY IP 250 OP 250 PS 637: Performed by: HOSPITALIST

## 2020-10-22 PROCEDURE — 120N000001 HC R&B MED SURG/OB

## 2020-10-22 PROCEDURE — 250N000013 HC RX MED GY IP 250 OP 250 PS 637: Performed by: PSYCHIATRY & NEUROLOGY

## 2020-10-22 PROCEDURE — 250N000013 HC RX MED GY IP 250 OP 250 PS 637: Performed by: NURSE PRACTITIONER

## 2020-10-22 PROCEDURE — 250N000013 HC RX MED GY IP 250 OP 250 PS 637: Performed by: INTERNAL MEDICINE

## 2020-10-22 PROCEDURE — 99231 SBSQ HOSP IP/OBS SF/LOW 25: CPT | Performed by: INTERNAL MEDICINE

## 2020-10-22 RX ADMIN — LEVOTHYROXINE SODIUM 88 MCG: 88 TABLET ORAL at 08:15

## 2020-10-22 RX ADMIN — Medication 0.25 MG: at 08:15

## 2020-10-22 RX ADMIN — ASPIRIN 81 MG: 81 TABLET, COATED ORAL at 08:14

## 2020-10-22 RX ADMIN — DOCUSATE SODIUM 100 MG: 100 CAPSULE, LIQUID FILLED ORAL at 21:41

## 2020-10-22 RX ADMIN — SALMETEROL XINAFOATE 1 PUFF: 50 POWDER, METERED ORAL; RESPIRATORY (INHALATION) at 08:17

## 2020-10-22 RX ADMIN — HALOPERIDOL 5 MG: 5 TABLET ORAL at 14:16

## 2020-10-22 RX ADMIN — DOCUSATE SODIUM 100 MG: 100 CAPSULE, LIQUID FILLED ORAL at 08:14

## 2020-10-22 RX ADMIN — POLYETHYLENE GLYCOL 3350 17 G: 17 POWDER, FOR SOLUTION ORAL at 08:18

## 2020-10-22 RX ADMIN — SALMETEROL XINAFOATE 1 PUFF: 50 POWDER, METERED ORAL; RESPIRATORY (INHALATION) at 21:41

## 2020-10-22 RX ADMIN — HALOPERIDOL 7 MG: 5 TABLET ORAL at 21:41

## 2020-10-22 RX ADMIN — NICOTINE POLACRILEX 2 MG: 2 GUM, CHEWING ORAL at 21:46

## 2020-10-22 RX ADMIN — ATORVASTATIN CALCIUM 80 MG: 40 TABLET, FILM COATED ORAL at 21:41

## 2020-10-22 RX ADMIN — NICOTINE POLACRILEX 2 MG: 2 GUM, CHEWING ORAL at 14:18

## 2020-10-22 RX ADMIN — NICOTINE 1 PATCH: 21 PATCH, EXTENDED RELEASE TRANSDERMAL at 08:16

## 2020-10-22 RX ADMIN — VENLAFAXINE HYDROCHLORIDE 75 MG: 75 CAPSULE, EXTENDED RELEASE ORAL at 08:15

## 2020-10-22 RX ADMIN — MEMANTINE 5 MG: 5 TABLET ORAL at 08:15

## 2020-10-22 RX ADMIN — Medication 0.25 MG: at 21:41

## 2020-10-22 RX ADMIN — HALOPERIDOL 7 MG: 5 TABLET ORAL at 08:15

## 2020-10-22 ASSESSMENT — ACTIVITIES OF DAILY LIVING (ADL)
ADLS_ACUITY_SCORE: 13

## 2020-10-22 NOTE — PROGRESS NOTES
[facility]    HOSPITALIST PROGRESS NOTE :   --------------------------------------------------    Date of Admission:  9/9/2020    Cumulative Summary: John Juárez is a 54 year old male with PMHx of schizophrenia, hx of TBI, alcohol use disorder, COPD, hyperlipidemia and hypothyroidism who was admitted on 9/9/2020 for evaluation of aggressive behavior at his group home. His medical condition remains stable, though his group home is now unwilling to take him back. Hospitalization has been prolonged while arranging for new placement.     Assessment & Plan     Neurocognitive disorder dt hx of TBI and alcohol use disorder  Schizophrenia  Had been residing in group home prior to admission.  Brought to hospital for evaluation of aggressive behaviors. Group home now unwilling to take him back. Has had numerous CODE 21s called this stay. Seen by psych, meds adjusted. Is currently under civil commitment and court hold through [facility] until 7/31/21.    -- Patient has been last evaluated by psych on September 29.  -- Continue patient on Cogentin 0.25 mg twice daily, Haldol 7 mg twice daily, Namenda 5 mg daily and Effexor 75 mg p.o. daily.  -- PRN zyprexa available for agitation  -- monitor periodic EKGs to ensure QTc remains stable     Hyperlipidemia  -- Chronic and stable on aspirin and statin     COPD; Not O2 dependent.  -- Stable on salmeterol     Hypothyroidism  --Chronic and stable on levothyroxine     Tobacco Use  --Using nicotine patch    Diet: Room Service  Combination Diet Regular Diet Adult; Safe Tray - NO utensils    Valentin Catheter: not present  DVT Prophylaxis: Pneumatic Compression Devices  Code Status: Full Code    The patient's care was discussed with the Patient.    Disposition Plan   Expected discharge: discharge date is unclear , pending safe disposition plan , SW are following , he is planned to receive the call tomorrow .    Carmen Douglass MD, FACP  Text Page (7am -  6pm)    ----------------------------------------------------------------------------------------------------------------------    Interval History    Patient was sen and examined, denying any complaints     -Data reviewed today: I reviewed all new labs and imaging results over the last 24 hours.    I personally reviewed no images or EKG's today.    Physical Exam   Temp: 98.3  F (36.8  C) Temp src: Oral BP: 109/75 Pulse: 72   Resp: 18 SpO2: 98 %      Vitals:    09/09/20 1754 09/25/20 0632   Weight: 70 kg (154 lb 5.2 oz) 71 kg (156 lb 8 oz)     Vital Signs with Ranges  Temp:  [98.3  F (36.8  C)] 98.3  F (36.8  C)  Pulse:  [72] 72  Resp:  [18] 18  BP: (109)/(75) 109/75  SpO2:  [98 %] 98 %  No intake/output data recorded.    GENERAL: Alert , awake and oriented to self , NAD. Not overly conversive.   HEENT: Normocephalic. EOMI. No icterus or injection. Nares normal.   LUNGS: Clear to auscultation. No dyspnea at rest.   HEART: Regular rate. Extremities perfused.   ABDOMEN: Soft, nontender, and nondistended. Positive bowel sounds.   EXTREMITIES: No LE edema noted.   NEUROLOGIC: Moves extremities x4 on command. No acute focal neurologic abnormalities noted.     Medications       aspirin  81 mg Oral Daily     atorvastatin  80 mg Oral At Bedtime     benztropine  0.25 mg Oral BID     docusate sodium  100 mg Oral BID     haloperidol  7 mg Oral BID     levothyroxine  88 mcg Oral Daily     memantine  5 mg Oral Daily     nicotine  1 patch Transdermal Daily     nicotine   Transdermal TID     polyethylene glycol  17 g Oral Daily     salmeterol  1 puff Inhalation BID     venlafaxine  75 mg Oral Daily with breakfast       Data   No lab results found in last 7 days.    Imaging:   No results found for this or any previous visit (from the past 24 hour(s)).

## 2020-10-22 NOTE — PROGRESS NOTES
Care Management Follow Up Note    Length of Stay (days) 33    Patient plan of care discussed at Interdisciplinary Rounds: yes  Expected Discharge Date: (Pending placement)  Concerns to be Addressed:     Obtaining funding needed in order for patient to admit to [facility]. Today patient participated in an interview with [facility]  Meaghan Olveraon [phone].  The [facility] is not making on site visits, thus she has sent multiple OCTAVIA's which need to be signed and an Diagnosis Verification form which DAVINA/MD will need to complete in order to place patient back into a TBI wavier program.  Patient also now has a new commitment .  His name is Kurtis Chapin thrkelley Baird, [phone]. He is replacing Ginayessi Baird.   [facility] Director, Emily at [phone],  is aware the assessment priocess  started today.     Anticipated Discharge Disposition:  [facility]  Anticipated Discharge Services:    Anticipated Discharge DME:      Plan: Continue working thru the Wavier Application process.      BETTYE Castillo

## 2020-10-22 NOTE — PLAN OF CARE
"Cognitive Concerns/ Orientation : A&O x self only   BEHAVIOR & AGGRESSION TOOL COLOR: red stating\"get a , I'm getting the fuck out of here\"  ABNL VS/O2: Daily vital signs  MOBILITY: Independent in room  PAIN MANAGMENT: Denied  DIET: Regular-good appetite  DRAIN/DEVICES: None  TELEMETRY RHYTHM: N/a  SKIN: Scattered bruises,and scabs  TESTS/PROCEDURES: N/a  D/C DAY/GOALS/PLACE: Pending placement  OTHER IMPORTANT INFO: On court hold. Door alarm in place. PRN haldol given once. Gave nicotine gum after requesting to go out and smoke    "

## 2020-10-22 NOTE — PROGRESS NOTES
BRIEF NUTRITION REASSESSMENT    CURRENT DIET AND INTAKE:  Diet: Regular            Continues with 100% intakes, ordering adequate meals per review.     NEW FINDINGS:  Awaiting placement     MALNUTRITION:  Patient does not meet two of the criteria necessary for diagnosing malnutrition.     NUTRITION INTERVENTION:  Nutrition Diagnosis:  No nutrition diagnosis at this time.    Implementation:  Nutrition Education: Not appropriate at this time due to patient condition    FOLLOW UP/MONITORING:   RD will sign off at this time. Please consult if needs arise.     Fely Hernandes RD,   [facility] [place], [address] 53, [facility]   Pager: [phone]  Weekend Pager: [phone]

## 2020-10-22 NOTE — PLAN OF CARE
DATE & TIME: 10/21/20, 1046 - 9667          Cognitive Concerns/ Orientation : A&O x self only   BEHAVIOR & AGGRESSION TOOL COLOR: Green     ABNL VS/O2: Daily vital signs  MOBILITY: Independent in room  PAIN MANAGMENT: Denied  DIET: Regular  BOWEL/BLADDER: Continent  ABNL LAB/BG: N/a  DRAIN/DEVICES: None  TELEMETRY RHYTHM: N/a  SKIN: Scattered bruises, intact  TESTS/PROCEDURES: N/a  D/C DAY/GOALS/PLACE: Pending placement  OTHER IMPORTANT INFO: On court hold. Door alarm in place  MD/RN ROUNDING SIGNED OFF D/E SHIFT: N/a  COMMIT TO SIT DONE AND SIGNED OFF Yes

## 2020-10-22 NOTE — PLAN OF CARE
1342-1694: Up IND in room. Elopement precautions in place w/door alarm. A/o to self only. Calm and cooperative this shift. Denies pain. Nicotine patch in place LUE. Reg safe tray diet. Awaiting placement.

## 2020-10-23 PROCEDURE — 99231 SBSQ HOSP IP/OBS SF/LOW 25: CPT | Performed by: INTERNAL MEDICINE

## 2020-10-23 PROCEDURE — 120N000001 HC R&B MED SURG/OB

## 2020-10-23 PROCEDURE — 250N000013 HC RX MED GY IP 250 OP 250 PS 637: Performed by: PSYCHIATRY & NEUROLOGY

## 2020-10-23 PROCEDURE — 250N000013 HC RX MED GY IP 250 OP 250 PS 637: Performed by: INTERNAL MEDICINE

## 2020-10-23 PROCEDURE — 250N000013 HC RX MED GY IP 250 OP 250 PS 637: Performed by: NURSE PRACTITIONER

## 2020-10-23 PROCEDURE — 250N000013 HC RX MED GY IP 250 OP 250 PS 637: Performed by: HOSPITALIST

## 2020-10-23 RX ADMIN — DOCUSATE SODIUM 100 MG: 100 CAPSULE, LIQUID FILLED ORAL at 08:46

## 2020-10-23 RX ADMIN — NICOTINE POLACRILEX 2 MG: 2 GUM, CHEWING ORAL at 21:40

## 2020-10-23 RX ADMIN — Medication 0.25 MG: at 21:36

## 2020-10-23 RX ADMIN — MEMANTINE 5 MG: 5 TABLET ORAL at 08:46

## 2020-10-23 RX ADMIN — VENLAFAXINE HYDROCHLORIDE 75 MG: 75 CAPSULE, EXTENDED RELEASE ORAL at 08:46

## 2020-10-23 RX ADMIN — LEVOTHYROXINE SODIUM 88 MCG: 88 TABLET ORAL at 08:46

## 2020-10-23 RX ADMIN — POLYETHYLENE GLYCOL 3350 17 G: 17 POWDER, FOR SOLUTION ORAL at 08:47

## 2020-10-23 RX ADMIN — SALMETEROL XINAFOATE 1 PUFF: 50 POWDER, METERED ORAL; RESPIRATORY (INHALATION) at 21:38

## 2020-10-23 RX ADMIN — Medication 0.25 MG: at 08:46

## 2020-10-23 RX ADMIN — ASPIRIN 81 MG: 81 TABLET, COATED ORAL at 08:46

## 2020-10-23 RX ADMIN — HALOPERIDOL 7 MG: 5 TABLET ORAL at 21:37

## 2020-10-23 RX ADMIN — DOCUSATE SODIUM 100 MG: 100 CAPSULE, LIQUID FILLED ORAL at 21:36

## 2020-10-23 RX ADMIN — ATORVASTATIN CALCIUM 80 MG: 40 TABLET, FILM COATED ORAL at 21:36

## 2020-10-23 RX ADMIN — NICOTINE 1 PATCH: 21 PATCH, EXTENDED RELEASE TRANSDERMAL at 08:48

## 2020-10-23 RX ADMIN — HALOPERIDOL 7 MG: 5 TABLET ORAL at 08:46

## 2020-10-23 ASSESSMENT — ACTIVITIES OF DAILY LIVING (ADL)
ADLS_ACUITY_SCORE: 13

## 2020-10-23 NOTE — PLAN OF CARE
Cognitive Concerns/ Orientation : A&O x self only. Calm and cooperative  BEHAVIOR & AGGRESSION TOOL COLOR: Green     ABNL VS/O2: Daily vital signs  MOBILITY: Independent in room  PAIN MANAGMENT: Denied  DIET: Regular-good appetite  BOWEL/BLADDER: Continent  ABNL LAB/BG: N/a  DRAIN/DEVICES: None  TELEMETRY RHYTHM: N/a  SKIN: Scattered bruises and scabs  TESTS/PROCEDURES: N/a  D/C DAY/GOALS/PLACE: Pending placement  OTHER IMPORTANT INFO: On court hold. Door alarm in place. Showered with encouragementoday after staff set up the shower

## 2020-10-23 NOTE — PLAN OF CARE
DATE & TIME: 10/22/20, 5520 - 9837    Cognitive Concerns/ Orientation : A&O x self only   BEHAVIOR & AGGRESSION TOOL COLOR: Green   ABNL VS/O2: Daily vital signs  MOBILITY: Independent in room  PAIN MANAGMENT: Denied  DIET: Regular  BOWEL/BLADDER: Continent  ABNL LAB/BG: N/a  DRAIN/DEVICES: None  TELEMETRY RHYTHM: N/a  SKIN: Scattered bruises and scabs  TESTS/PROCEDURES: N/a  D/C DAY/GOALS/PLACE: Pending placement  OTHER IMPORTANT INFO: On court hold. Door alarm in place  MD/RN ROUNDING SIGNED OFF D/E SHIFT: N/a  COMMIT TO SIT DONE AND SIGNED OFF Yes

## 2020-10-23 NOTE — PROGRESS NOTES
Care Management Follow Up Note    Length of Stay (days) 34    Patient plan of care discussed at Interdisciplinary Rounds: yes  Expected Discharge Date: (Pending placement)  Concerns to be Addressed:  discharge planning/comittment       Anticipated Discharge Disposition:  TBD  Anticipated Discharge Services:  TBD  Anticipated Discharge DME:  None    Plan:  Pt signed multiple OCTAVIA's per [facility] request along with other requested documents related to his [place] [facility] assessment yesterday. SW emailed these to back to  Meaghan Layne along with H&P and med list. Still working on requested ICD 10 form.     JORDY Drummond, LGSW  [phone]  [facility]

## 2020-10-23 NOTE — PROGRESS NOTES
[facility]    HOSPITALIST PROGRESS NOTE :   --------------------------------------------------    Date of Admission:  9/9/2020    Cumulative Summary: John Juárez is a 54 year old male with PMHx of schizophrenia, hx of TBI, alcohol use disorder, COPD, hyperlipidemia and hypothyroidism who was admitted on 9/9/2020 for evaluation of aggressive behavior at his group home. His medical condition remains stable, though his group home is now unwilling to take him back. Hospitalization has been prolonged while arranging for new placement.     Assessment & Plan     Neurocognitive disorder dt hx of TBI and alcohol use disorder  Schizophrenia  Had been residing in group home prior to admission.  Brought to hospital for evaluation of aggressive behaviors. Group home now unwilling to take him back. Has had numerous CODE 21s called this stay. Seen by psych, meds adjusted. Is currently under civil commitment and court hold through [facility] until 7/31/21.    -- Patient has been last evaluated by psych on September 29.  -- Continue patient on Cogentin 0.25 mg twice daily, Haldol 7 mg twice daily, Namenda 5 mg daily and Effexor 75 mg p.o. daily.  -- PRN zyprexa available for agitation  -- monitor periodic EKGs to ensure QTc remains stable  -- Patient did have an interview yesterday with [facility] , they have more paper work to be filled , SW will be following up on the paper , not sure if we will also need psych to fill MD input for papers.     Hyperlipidemia  -- Chronic and stable on aspirin and statin     COPD; Not O2 dependent.  -- Stable on salmeterol     Hypothyroidism  --Chronic and stable on levothyroxine     Tobacco Use  --Using nicotine patch    Diet: Room Service  Combination Diet Regular Diet Adult; Safe Tray - NO utensils    Valentin Catheter: not present  DVT Prophylaxis: Pneumatic Compression Devices  Code Status: Full Code    The patient's care was discussed with the  Patient.    Disposition Plan   Expected discharge: discharge date is unclear , pending safe disposition plan , SW are following     Carmen Douglass MD, FACP  Text Page (7am - 6pm)    ----------------------------------------------------------------------------------------------------------------------    Interval History    Patient was seen and examined,lying in bed, encourage him to increase his mobilization     -Data reviewed today: I reviewed all new labs and imaging results over the last 24 hours.    I personally reviewed no images or EKG's today.    Physical Exam   Temp: 97.5  F (36.4  C) Temp src: Oral BP: 105/49 Pulse: 73   Resp: 16 SpO2: 98 % O2 Device: None (Room air)    Vitals:    09/09/20 1754 09/25/20 0632   Weight: 70 kg (154 lb 5.2 oz) 71 kg (156 lb 8 oz)     Vital Signs with Ranges  Temp:  [97.5  F (36.4  C)-98.1  F (36.7  C)] 97.5  F (36.4  C)  Pulse:  [73-77] 73  Resp:  [16] 16  BP: ()/(49-53) 105/49  SpO2:  [98 %] 98 %  No intake/output data recorded.    GENERAL: Alert , awake and oriented to self , NAD. Not overly conversive.   HEENT: Normocephalic. EOMI. No icterus or injection. Nares normal.   LUNGS: Clear to auscultation. No dyspnea at rest.   HEART: Regular rate. Extremities perfused.   ABDOMEN: Soft, nontender, and nondistended. Positive bowel sounds.   EXTREMITIES: No LE edema noted.   NEUROLOGIC: Moves extremities x4 on command. No acute focal neurologic abnormalities noted.     Medications       aspirin  81 mg Oral Daily     atorvastatin  80 mg Oral At Bedtime     benztropine  0.25 mg Oral BID     docusate sodium  100 mg Oral BID     haloperidol  7 mg Oral BID     levothyroxine  88 mcg Oral Daily     memantine  5 mg Oral Daily     nicotine  1 patch Transdermal Daily     nicotine   Transdermal TID     polyethylene glycol  17 g Oral Daily     salmeterol  1 puff Inhalation BID     venlafaxine  75 mg Oral Daily with breakfast       Data   No lab results found in last 7 days.    Imaging:   No  results found for this or any previous visit (from the past 24 hour(s)).

## 2020-10-23 NOTE — PLAN OF CARE
DATE & TIME: 10/22 8887-7577  Cognitive Concerns/ Orientation : A&O x 2, disoriented to location and situation  BEHAVIOR & AGGRESSION TOOL COLOR: green this shift  ABNL VS/O2: Daily vital signs  MOBILITY: Independent in room  PAIN MANAGMENT: Denied  DIET: Regular-good appetite  DRAIN/DEVICES: None  SKIN: Scattered bruises,and scabs  D/C DAY/GOALS/PLACE: Pending placement  OTHER IMPORTANT INFO: On court hold. Door alarm in place. Nicorette gum given x2

## 2020-10-24 PROCEDURE — 250N000013 HC RX MED GY IP 250 OP 250 PS 637: Performed by: NURSE PRACTITIONER

## 2020-10-24 PROCEDURE — 250N000013 HC RX MED GY IP 250 OP 250 PS 637: Performed by: PSYCHIATRY & NEUROLOGY

## 2020-10-24 PROCEDURE — 250N000013 HC RX MED GY IP 250 OP 250 PS 637: Performed by: INTERNAL MEDICINE

## 2020-10-24 PROCEDURE — 99231 SBSQ HOSP IP/OBS SF/LOW 25: CPT | Performed by: INTERNAL MEDICINE

## 2020-10-24 PROCEDURE — 93010 ELECTROCARDIOGRAM REPORT: CPT | Performed by: INTERNAL MEDICINE

## 2020-10-24 PROCEDURE — 120N000001 HC R&B MED SURG/OB

## 2020-10-24 PROCEDURE — 93005 ELECTROCARDIOGRAM TRACING: CPT

## 2020-10-24 PROCEDURE — 250N000013 HC RX MED GY IP 250 OP 250 PS 637: Performed by: HOSPITALIST

## 2020-10-24 RX ADMIN — Medication 0.25 MG: at 09:45

## 2020-10-24 RX ADMIN — POLYETHYLENE GLYCOL 3350 17 G: 17 POWDER, FOR SOLUTION ORAL at 09:43

## 2020-10-24 RX ADMIN — DOCUSATE SODIUM 100 MG: 100 CAPSULE, LIQUID FILLED ORAL at 09:45

## 2020-10-24 RX ADMIN — VENLAFAXINE HYDROCHLORIDE 75 MG: 75 CAPSULE, EXTENDED RELEASE ORAL at 09:45

## 2020-10-24 RX ADMIN — SALMETEROL XINAFOATE 1 PUFF: 50 POWDER, METERED ORAL; RESPIRATORY (INHALATION) at 20:43

## 2020-10-24 RX ADMIN — NICOTINE 1 PATCH: 21 PATCH, EXTENDED RELEASE TRANSDERMAL at 09:45

## 2020-10-24 RX ADMIN — HALOPERIDOL 7 MG: 5 TABLET ORAL at 09:45

## 2020-10-24 RX ADMIN — SALMETEROL XINAFOATE 1 PUFF: 50 POWDER, METERED ORAL; RESPIRATORY (INHALATION) at 09:45

## 2020-10-24 RX ADMIN — Medication 0.25 MG: at 20:43

## 2020-10-24 RX ADMIN — MEMANTINE 5 MG: 5 TABLET ORAL at 09:45

## 2020-10-24 RX ADMIN — ATORVASTATIN CALCIUM 80 MG: 40 TABLET, FILM COATED ORAL at 20:43

## 2020-10-24 RX ADMIN — HALOPERIDOL 7 MG: 5 TABLET ORAL at 20:43

## 2020-10-24 RX ADMIN — LEVOTHYROXINE SODIUM 88 MCG: 88 TABLET ORAL at 09:45

## 2020-10-24 RX ADMIN — DOCUSATE SODIUM 100 MG: 100 CAPSULE, LIQUID FILLED ORAL at 20:43

## 2020-10-24 RX ADMIN — ASPIRIN 81 MG: 81 TABLET, COATED ORAL at 09:45

## 2020-10-24 ASSESSMENT — ACTIVITIES OF DAILY LIVING (ADL)
ADLS_ACUITY_SCORE: 13

## 2020-10-24 NOTE — PLAN OF CARE
DATE & TIME: 10/24/2020 7-3 pm            Cognitive Concerns/ Orientation : A & O to self only. Flat/depressed mood. Calm and cooperative.   BEHAVIOR & AGGRESSION TOOL COLOR: Green     ABNL VS/O2: VSS on RA  MOBILITY: Independent in room  PAIN MANAGMENT: Denies  DIET: Regular  BOWEL/BLADDER: Continent. Pt stating he hasn't had BM in a week? BS+ and abdomen not distended. On scheduled colace. Will continue to monitor.   ABNL LAB/BG: No labs since 09/28.  DRAIN/DEVICES: None  TELEMETRY RHYTHM: N/A  SKIN: Scattered bruises and scabs.   TESTS/PROCEDURES: N/a  D/C DAY/GOALS/PLACE: Pending safe placement/disposition.   OTHER IMPORTANT INFO: On court hold. Door alarm in place. Pt kept stating he has places/house to go to or could go stay with sister. Writer explained that this is not a safe discharge plan and the court are involved now. Pt accepted this.

## 2020-10-24 NOTE — PROGRESS NOTES
[facility]    HOSPITALIST PROGRESS NOTE :   --------------------------------------------------    Date of Admission:  9/9/2020    Cumulative Summary: John Juárez is a 54 year old male with PMHx of schizophrenia, hx of TBI, alcohol use disorder, COPD, hyperlipidemia and hypothyroidism who was admitted on 9/9/2020 for evaluation of aggressive behavior at his group home. His medical condition remains stable, though his group home is now unwilling to take him back. Hospitalization has been prolonged while arranging for new placement.     Assessment & Plan     Neurocognitive disorder dt hx of TBI and alcohol use disorder  Schizophrenia  Had been residing in group home prior to admission.  Brought to hospital for evaluation of aggressive behaviors. Group home now unwilling to take him back. Has had numerous CODE 21s called this stay. Seen by psych, meds adjusted. Is currently under civil commitment and court hold through [facility] until 7/31/21.  Patient has been undergoing paper work for funding and placement.    -- Patient has been last evaluated by psych on September 29.  -- Currently patient is on Cogentin 0.25 mg twice daily, Haldol 7 mg twice daily, Namenda 5 mg daily and Effexor 75 mg p.o. daily, continue current meds   -- PRN zyprexa available for agitation  -- monitor periodic EKGs to ensure QTc remains stable, will check EKG tomorrow morning   -- Patient did have an interview on Thursday with [facility] , they have more paper work to be filled , SW will be following up on the paper , not sure if we will also need psych to fill MD input for papers.     Hyperlipidemia  -- Chronic and stable on aspirin and statin     COPD; Not O2 dependent.  -- Stable on salmeterol     Hypothyroidism  --Chronic and stable on levothyroxine     Tobacco Use  --Using nicotine patch    Diet: Room Service  Combination Diet Regular Diet Adult; Safe Tray - NO utensils    Valentin Catheter: not  present  DVT Prophylaxis: Pneumatic Compression Devices  Code Status: Full Code    The patient's care was discussed with the Patient.    Disposition Plan   Expected discharge: discharge date is unclear , pending safe disposition plan , SW are following     Carmen Douglass MD, FACP  Text Page (7am - 6pm)    ----------------------------------------------------------------------------------------------------------------------    Interval History    Patient was seen and examined , lying in room , was sleeping , denying any complaints      -Data reviewed today: I reviewed all new labs and imaging results over the last 24 hours.    I personally reviewed no images or EKG's today.    Physical Exam   Temp: 97.3  F (36.3  C) Temp src: Oral BP: 107/76 Pulse: 70   Resp: 16 SpO2: 99 % O2 Device: None (Room air)    Vitals:    09/09/20 1754 09/25/20 0632   Weight: 70 kg (154 lb 5.2 oz) 71 kg (156 lb 8 oz)     Vital Signs with Ranges  Temp:  [97.3  F (36.3  C)] 97.3  F (36.3  C)  Pulse:  [70] 70  Resp:  [16] 16  BP: (107)/(76) 107/76  SpO2:  [99 %] 99 %  I/O last 3 completed shifts:  In: 1140 [P.O.:1140]  Out: -     GENERAL: Alert , awake and oriented to self , NAD. Not overly conversive.   HEENT: Normocephalic. EOMI. No icterus or injection. Nares normal.   LUNGS: Clear to auscultation. No dyspnea at rest.   HEART: Regular rate. Extremities perfused.   ABDOMEN: Soft, nontender, and nondistended. Positive bowel sounds.   EXTREMITIES: No LE edema noted.   NEUROLOGIC: Moves extremities x4 on command. No acute focal neurologic abnormalities noted.     Medications       aspirin  81 mg Oral Daily     atorvastatin  80 mg Oral At Bedtime     benztropine  0.25 mg Oral BID     docusate sodium  100 mg Oral BID     haloperidol  7 mg Oral BID     levothyroxine  88 mcg Oral Daily     memantine  5 mg Oral Daily     nicotine  1 patch Transdermal Daily     nicotine   Transdermal TID     polyethylene glycol  17 g Oral Daily     salmeterol  1 puff  Inhalation BID     venlafaxine  75 mg Oral Daily with breakfast       Data   No lab results found in last 7 days.    Imaging:   No results found for this or any previous visit (from the past 24 hour(s)).

## 2020-10-24 NOTE — PLAN OF CARE
Cognitive Concerns/ Orientation : A&O to self only. Calm and cooperative  BEHAVIOR & AGGRESSION TOOL COLOR: Green     ABNL VS/O2: Daily vital signs  MOBILITY: Independent in room  PAIN MANAGMENT: Denies  DIET: Regular-good appetite  BOWEL/BLADDER: Continent  ABNL LAB/BG: N/a  DRAIN/DEVICES: None  TELEMETRY RHYTHM: N/a  SKIN: Scattered bruises and scabs  TESTS/PROCEDURES: N/a  D/C DAY/GOALS/PLACE: Pending placement  OTHER IMPORTANT INFO: On court hold. Door alarm in place.

## 2020-10-24 NOTE — PLAN OF CARE
DATE & TIME: 10/23/2020 7514-7840                  Cognitive Concerns/ Orientation : A&O x self only. Calm and cooperative  BEHAVIOR & AGGRESSION TOOL COLOR: Green     ABNL VS/O2: Daily vital signs  MOBILITY: Independent in room  PAIN MANAGMENT: Denied  DIET: Regular-good appetite  BOWEL/BLADDER: Continent  ABNL LAB/BG: N/a  DRAIN/DEVICES: None  TELEMETRY RHYTHM: N/a  SKIN: Scattered bruises and scabs  TESTS/PROCEDURES: N/a  D/C DAY/GOALS/PLACE: Pending placement  OTHER IMPORTANT INFO: On court hold. Door alarm in place.

## 2020-10-25 PROCEDURE — 99231 SBSQ HOSP IP/OBS SF/LOW 25: CPT | Performed by: INTERNAL MEDICINE

## 2020-10-25 PROCEDURE — 120N000001 HC R&B MED SURG/OB

## 2020-10-25 PROCEDURE — 250N000013 HC RX MED GY IP 250 OP 250 PS 637: Performed by: INTERNAL MEDICINE

## 2020-10-25 PROCEDURE — 250N000013 HC RX MED GY IP 250 OP 250 PS 637: Performed by: PSYCHIATRY & NEUROLOGY

## 2020-10-25 PROCEDURE — 250N000013 HC RX MED GY IP 250 OP 250 PS 637: Performed by: HOSPITALIST

## 2020-10-25 RX ADMIN — POLYETHYLENE GLYCOL 3350 17 G: 17 POWDER, FOR SOLUTION ORAL at 09:49

## 2020-10-25 RX ADMIN — SALMETEROL XINAFOATE 1 PUFF: 50 POWDER, METERED ORAL; RESPIRATORY (INHALATION) at 09:55

## 2020-10-25 RX ADMIN — HALOPERIDOL 7 MG: 5 TABLET ORAL at 20:56

## 2020-10-25 RX ADMIN — MEMANTINE 5 MG: 5 TABLET ORAL at 09:46

## 2020-10-25 RX ADMIN — DOCUSATE SODIUM 100 MG: 100 CAPSULE, LIQUID FILLED ORAL at 09:46

## 2020-10-25 RX ADMIN — DOCUSATE SODIUM 100 MG: 100 CAPSULE, LIQUID FILLED ORAL at 20:56

## 2020-10-25 RX ADMIN — SALMETEROL XINAFOATE 1 PUFF: 50 POWDER, METERED ORAL; RESPIRATORY (INHALATION) at 20:56

## 2020-10-25 RX ADMIN — Medication 0.25 MG: at 20:56

## 2020-10-25 RX ADMIN — ASPIRIN 81 MG: 81 TABLET, COATED ORAL at 09:46

## 2020-10-25 RX ADMIN — VENLAFAXINE HYDROCHLORIDE 75 MG: 75 CAPSULE, EXTENDED RELEASE ORAL at 09:47

## 2020-10-25 RX ADMIN — ATORVASTATIN CALCIUM 80 MG: 40 TABLET, FILM COATED ORAL at 20:56

## 2020-10-25 RX ADMIN — Medication 0.25 MG: at 09:46

## 2020-10-25 RX ADMIN — HALOPERIDOL 7 MG: 5 TABLET ORAL at 09:46

## 2020-10-25 RX ADMIN — LEVOTHYROXINE SODIUM 88 MCG: 88 TABLET ORAL at 09:47

## 2020-10-25 ASSESSMENT — ACTIVITIES OF DAILY LIVING (ADL)
ADLS_ACUITY_SCORE: 13

## 2020-10-25 NOTE — PLAN OF CARE
Cognitive Concerns/ Orientation : A & O x 1 (self only); calm and cooperative; flat effect     BEHAVIOR & AGGRESSION TOOL COLOR: Red due to elopement risk (Green on this shift)  CIWA SCORE: N/A   ABNL VS/O2: VSS on RA; daily vitals   MOBILITY: ind. In room   PAIN MANAGMENT: denies   DIET: Regular, safe tray   BOWEL/BLADDER: BS active x 4; continent  ABNL LAB/BG: no new recent labs   DRAIN/DEVICES: No IV  TELEMETRY RHYTHM: N/A  SKIN: Scattered bruising; intact   TESTS/PROCEDURES: N/A  D/C DAY/GOALS/PLACE: pending discharge-placement   OTHER IMPORTANT INFO: on court hold; door alarm in place

## 2020-10-25 NOTE — PROGRESS NOTES
[facility]    HOSPITALIST PROGRESS NOTE :   --------------------------------------------------    Date of Admission:  9/9/2020    Cumulative Summary: John Juárez is a 54 year old male with PMHx of schizophrenia, hx of TBI, alcohol use disorder, COPD, hyperlipidemia and hypothyroidism who was admitted on 9/9/2020 for evaluation of aggressive behavior at his group home. His medical condition remains stable, though his group home is now unwilling to take him back. Hospitalization has been prolonged while arranging for new placement.     Assessment & Plan     Neurocognitive disorder dt hx of TBI and alcohol use disorder  Schizophrenia  Had been residing in group home prior to admission.  Brought to hospital for evaluation of aggressive behaviors. Group home now unwilling to take him back. Has had numerous CODE 21s called this stay. Seen by psych, meds adjusted. Is currently under civil commitment and court hold through [facility] until 7/31/21.  Patient has been undergoing paper work for funding and placement.    -- Patient has been last evaluated by psych on September 29.Patient has been doing well clinically  -- Currently patient is on Cogentin 0.25 mg twice daily, Haldol 7 mg twice daily, Namenda 5 mg daily and Effexor 75 mg p.o. daily, continue current meds   -- PRN zyprexa available for agitation  -- monitor periodic EKGs to ensure QTc remains stable, will check EKG tomorrow morning   -- Patient did have an interview on Thursday with [facility] , they have more paper work to be filled , SW will be following up on the paper , not sure if we will also need psych to fill MD input for papers.     Hyperlipidemia  -- Chronic and stable on aspirin and statin     COPD; Not O2 dependent.  -- Stable on salmeterol     Hypothyroidism  --Chronic and stable on levothyroxine     Tobacco Use  --Using nicotine patch    Diet: Room Service  Combination Diet Regular Diet Adult; Safe Tray - NO  utensils    Valentin Catheter: not present  DVT Prophylaxis: Pneumatic Compression Devices  Code Status: Full Code    The patient's care was discussed with the Patient.    Disposition Plan   Expected discharge: discharge date is unclear , pending safe disposition plan , SW are following     Carmen Douglass MD, FACP  Text Page (7am - 6pm)    ----------------------------------------------------------------------------------------------------------------------    Interval History    Patient was seen and examined, lying in bed , denying any new complaints     -Data reviewed today: I reviewed all new labs and imaging results over the last 24 hours.    I personally reviewed no images or EKG's today.    Physical Exam   Temp: 97.7  F (36.5  C) Temp src: Oral BP: 104/73 Pulse: 91   Resp: 16 SpO2: 98 % O2 Device: None (Room air)    Vitals:    09/09/20 1754 09/25/20 0632   Weight: 70 kg (154 lb 5.2 oz) 71 kg (156 lb 8 oz)     Vital Signs with Ranges  Temp:  [97.7  F (36.5  C)-97.8  F (36.6  C)] 97.7  F (36.5  C)  Pulse:  [79-91] 91  Resp:  [16] 16  BP: ()/(71-73) 104/73  SpO2:  [98 %-100 %] 98 %  I/O last 3 completed shifts:  In: 600 [P.O.:600]  Out: -     GENERAL: Alert , awake and oriented to self , NAD. Not overly conversive.   HEENT: Normocephalic. EOMI. No icterus or injection. Nares normal.   LUNGS: Clear to auscultation. No dyspnea at rest.   HEART: Regular rate. Extremities perfused.   ABDOMEN: Soft, nontender, and nondistended. Positive bowel sounds.   EXTREMITIES: No LE edema noted.   NEUROLOGIC: Moves extremities x4 on command. No acute focal neurologic abnormalities noted.     Medications       aspirin  81 mg Oral Daily     atorvastatin  80 mg Oral At Bedtime     benztropine  0.25 mg Oral BID     docusate sodium  100 mg Oral BID     haloperidol  7 mg Oral BID     levothyroxine  88 mcg Oral Daily     memantine  5 mg Oral Daily     nicotine  1 patch Transdermal Daily     nicotine   Transdermal TID     polyethylene  glycol  17 g Oral Daily     salmeterol  1 puff Inhalation BID     venlafaxine  75 mg Oral Daily with breakfast       Data   No lab results found in last 7 days.    Imaging:   No results found for this or any previous visit (from the past 24 hour(s)).

## 2020-10-25 NOTE — PLAN OF CARE
DATE & TIME: 10/24/2020; [id number]    Cognitive Concerns/ Orientation : A & O x 1 (self only); calm and cooperative; flat effect     BEHAVIOR & AGGRESSION TOOL COLOR: Red due to elopement risk (Green on this shift)  CIWA SCORE: N/A   ABNL VS/O2: VSS on RA; daily vitals   MOBILITY: ind. In room   PAIN MANAGMENT: denies   DIET: Regular, safe tray   BOWEL/BLADDER: BS active x 4; continent  ABNL LAB/BG: no new recent labs   DRAIN/DEVICES: No IV  TELEMETRY RHYTHM: N/A  SKIN: Scattered bruising; intact   TESTS/PROCEDURES: N/A  D/C DAY/GOALS/PLACE: pending discharge-placement   OTHER IMPORTANT INFO: on court hold; door alarm in place; encouraged to wash face, apply lotion to hands, lip balm; nicotine patch on R. Upper shoulder; Nursing will continue to monitor and assess.     MD/RN ROUNDING SIGNED OFF D/E SHIFT: N/A  COMMIT TO SIT DONE AND SIGNED OFF: COMPLETE

## 2020-10-25 NOTE — PLAN OF CARE
DATE & TIME: 10/25/20 7-3 pm    Cognitive Concerns/ Orientation : Alert to self only; flat, calm, and cooperative. Hypoactive/depressed mood all day.   BEHAVIOR & AGGRESSION TOOL COLOR: Red due to elopement risk (Green on this shift)  CIWA SCORE: N/A   ABNL VS/O2: VSS on RA  MOBILITY: IND In room   PAIN MANAGMENT: Denies   DIET: Regular, fair appetite.   BOWEL/BLADDER: No issues per pt report.   ABNL LAB/BG: No new recent labs.  DRAIN/DEVICES: No IV  TELEMETRY RHYTHM: N/A  SKIN: Scattered bruising; intact. Pale. Dry/flaky.    TESTS/PROCEDURES: N/A  D/C DAY/GOALS/PLACE: Pending discharge-placement   OTHER IMPORTANT INFO: on court hold; door alarm in place. No changes. Refused Nicotine patch.

## 2020-10-26 PROCEDURE — 250N000013 HC RX MED GY IP 250 OP 250 PS 637: Performed by: INTERNAL MEDICINE

## 2020-10-26 PROCEDURE — 250N000013 HC RX MED GY IP 250 OP 250 PS 637: Performed by: HOSPITALIST

## 2020-10-26 PROCEDURE — 120N000001 HC R&B MED SURG/OB

## 2020-10-26 PROCEDURE — 250N000013 HC RX MED GY IP 250 OP 250 PS 637: Performed by: PSYCHIATRY & NEUROLOGY

## 2020-10-26 PROCEDURE — 99231 SBSQ HOSP IP/OBS SF/LOW 25: CPT | Performed by: INTERNAL MEDICINE

## 2020-10-26 RX ADMIN — DOCUSATE SODIUM 100 MG: 100 CAPSULE, LIQUID FILLED ORAL at 09:38

## 2020-10-26 RX ADMIN — MEMANTINE 5 MG: 5 TABLET ORAL at 09:37

## 2020-10-26 RX ADMIN — HALOPERIDOL 7 MG: 5 TABLET ORAL at 09:38

## 2020-10-26 RX ADMIN — Medication 0.25 MG: at 19:58

## 2020-10-26 RX ADMIN — ASPIRIN 81 MG: 81 TABLET, COATED ORAL at 09:38

## 2020-10-26 RX ADMIN — DOCUSATE SODIUM 100 MG: 100 CAPSULE, LIQUID FILLED ORAL at 19:58

## 2020-10-26 RX ADMIN — SALMETEROL XINAFOATE 1 PUFF: 50 POWDER, METERED ORAL; RESPIRATORY (INHALATION) at 20:00

## 2020-10-26 RX ADMIN — ATORVASTATIN CALCIUM 80 MG: 40 TABLET, FILM COATED ORAL at 19:58

## 2020-10-26 RX ADMIN — Medication 0.25 MG: at 09:37

## 2020-10-26 RX ADMIN — HALOPERIDOL 7 MG: 5 TABLET ORAL at 19:58

## 2020-10-26 RX ADMIN — LEVOTHYROXINE SODIUM 88 MCG: 88 TABLET ORAL at 09:37

## 2020-10-26 RX ADMIN — POLYETHYLENE GLYCOL 3350 17 G: 17 POWDER, FOR SOLUTION ORAL at 09:36

## 2020-10-26 RX ADMIN — VENLAFAXINE HYDROCHLORIDE 75 MG: 75 CAPSULE, EXTENDED RELEASE ORAL at 09:38

## 2020-10-26 ASSESSMENT — ACTIVITIES OF DAILY LIVING (ADL)
ADLS_ACUITY_SCORE: 13

## 2020-10-26 NOTE — PLAN OF CARE
DATE & TIME: 10/26/2020 3978-2780  Cognitive Concerns/ Orientation : A to self, flat effect,hallucinating at times  BEHAVIOR & AGGRESSION TOOL COLOR: Green on this shift  CIWA SCORE: N/A       ABNL VS/O2: VSS on RA; daily vitals   MOBILITY: Ind in the room   PAIN MANAGMENT: Denies   DIET: Regular; safe tray   BOWEL/BLADDER: Continent   ABNL LAB/BG: no new recent labs   DRAIN/DEVICES: No IV   TELEMETRY RHYTHM: N/A  SKIN: Scattered bruising; intact   TESTS/PROCEDURES: N/A  D/C DAY/GOALS/PLACE: pending discharge- placement   OTHER IMPORTANT INFO: on court hold; door alarm in place; encouraged self cares today, refused nicotine patch; Nursing will continue to monitor and assess.

## 2020-10-26 NOTE — PLAN OF CARE
DATE & TIME: 10/25/2020; [id number]  Cognitive Concerns/ Orientation : A & O x 1 (self only); flat effect; fatigue; hallucinating at times  BEHAVIOR & AGGRESSION TOOL COLOR: Red due to elopement precautions (Green on this shift)  CIWA SCORE: N/A   ABNL VS/O2: VSS on RA; daily vitals   MOBILITY: ind in the room   PAIN MANAGMENT: denies   DIET: Regular; safe tray   BOWEL/BLADDER: BS active x 4; continent   ABNL LAB/BG: no new recent labs   DRAIN/DEVICES: No IV   TELEMETRY RHYTHM: N/A  SKIN: Scattered bruising; intact   TESTS/PROCEDURES: N/A  D/C DAY/GOALS/PLACE: pending discharge- placement   OTHER IMPORTANT INFO: on court hold; door alarm in place; encouraged self cares this evening, wash face, apply lotion to hands, lip balm, hair brushed; refused nicotine patch; Nursing will continue to monitor and assess.    MD/RN ROUNDING SIGNED OFF D/E SHIFT: N/A  COMMIT TO SIT DONE AND SIGNED OFF: COMPLETE

## 2020-10-26 NOTE — PROGRESS NOTES
[facility]    HOSPITALIST PROGRESS NOTE :   --------------------------------------------------    Date of Admission:  9/9/2020    Cumulative Summary: John Juárez is a 54 year old male with PMHx of schizophrenia, hx of TBI, alcohol use disorder, COPD, hyperlipidemia and hypothyroidism who was admitted on 9/9/2020 for evaluation of aggressive behavior at his group home. His medical condition remains stable, though his group home is now unwilling to take him back. Hospitalization has been prolonged while arranging for new placement.     Assessment & Plan     Neurocognitive disorder dt hx of TBI and alcohol use disorder  Schizophrenia  Had been residing in group home prior to admission.  Brought to hospital for evaluation of aggressive behaviors. Group home now unwilling to take him back. Has had numerous CODE 21s called this stay. Seen by psych, meds adjusted. Is currently under civil commitment and court hold through [facility] until 7/31/21.  Patient has been undergoing paper work for funding and placement.    -- Patient has been last evaluated by psych on September 29.Patient has been doing well clinically  -- Currently patient is on Cogentin 0.25 mg twice daily, Haldol 7 mg twice daily, Namenda 5 mg daily and Effexor 75 mg p.o. daily, continue current meds   -- PRN zyprexa available for agitation  -- monitor periodic EKGs to ensure QTc remains stable, will check EKG tomorrow morning   -- Patient did have an interview on Thursday with [facility]; paper work filled and emailed; SW following     Hyperlipidemia  -- Chronic and stable on aspirin and statin     COPD; Not O2 dependent.  -- Stable on salmeterol     Hypothyroidism  --Chronic and stable on levothyroxine     Tobacco Use  --Using nicotine patch    Diet: Room Service  Combination Diet Regular Diet Adult; Safe Tray - NO utensils    Valentin Catheter: not present  DVT Prophylaxis: Pneumatic Compression Devices  Code Status:  Full Code    The patient's care was discussed with the Bedside Nurse, Care Coordinator/ and Patient.    Disposition Plan   Expected discharge: discharge date is unclear , pending safe disposition plan , SW are following     Autumn Miranda MD    ----------------------------------------------------------------------------------------------------------------------    Interval History    Patient was seen and examined, no new complains, asking if he can go out to smoke (he has nicotine patch and nicotine gum prn available); denies chest pain, no SOB, no N/V, no abd pain.     -Data reviewed today: I reviewed all new labs and imaging results over the last 24 hours.    I personally reviewed no images or EKG's today.    Physical Exam   Temp: 97.3  F (36.3  C) Temp src: Oral BP: 112/73 Pulse: 77   Resp: 16 SpO2: 98 % O2 Device: None (Room air)    Vitals:    09/09/20 1754 09/25/20 0632   Weight: 70 kg (154 lb 5.2 oz) 71 kg (156 lb 8 oz)     Vital Signs with Ranges  Temp:  [97.3  F (36.3  C)-97.7  F (36.5  C)] 97.3  F (36.3  C)  Pulse:  [77-91] 77  Resp:  [16] 16  BP: (104-112)/(73) 112/73  SpO2:  [98 %] 98 %  I/O last 3 completed shifts:  In: 120 [P.O.:120]  Out: -     GENERAL: Alert , awake and oriented to self only, NAD, cooperative.   HEENT: Normocephalic. EOMI. No icterus or injection. Nares normal.   LUNGS: Clear to auscultation. No dyspnea at rest.   HEART: Regular rate. Extremities perfused.   ABDOMEN: Soft, nontender, and nondistended. Positive bowel sounds.   EXTREMITIES: No LE edema noted.   NEUROLOGIC: Moves extremities x4 on command. No acute focal neurologic abnormalities noted.     Medications       aspirin  81 mg Oral Daily     atorvastatin  80 mg Oral At Bedtime     benztropine  0.25 mg Oral BID     docusate sodium  100 mg Oral BID     haloperidol  7 mg Oral BID     levothyroxine  88 mcg Oral Daily     memantine  5 mg Oral Daily     nicotine  1 patch Transdermal Daily     nicotine    Transdermal TID     polyethylene glycol  17 g Oral Daily     salmeterol  1 puff Inhalation BID     venlafaxine  75 mg Oral Daily with breakfast       Data   No lab results found in last 7 days.    Imaging:   No results found for this or any previous visit (from the past 24 hour(s)).

## 2020-10-26 NOTE — PLAN OF CARE
DATE & TIME: 10/25/2020 Night  Cognitive Concerns/ Orientation : Alert, oriented to self, flat    BEHAVIOR & AGGRESSION TOOL COLOR: Red due to elopement precautions (Green on this shift)  ABNL VS/O2: VSS on RA; daily vitals   MOBILITY: Independent in room   PAIN MANAGMENT: denies   DIET: Regular; safe tray   BOWEL/BLADDER:   continent   DRAIN/DEVICES: No IV   SKIN: Scattered bruising; intact   D/C DAY/GOALS/PLACE: pending placement   OTHER IMPORTANT INFO: on court hold; door alarm in place

## 2020-10-27 LAB — INTERPRETATION ECG - MUSE: NORMAL

## 2020-10-27 PROCEDURE — 99231 SBSQ HOSP IP/OBS SF/LOW 25: CPT | Performed by: INTERNAL MEDICINE

## 2020-10-27 PROCEDURE — 120N000001 HC R&B MED SURG/OB

## 2020-10-27 PROCEDURE — 250N000013 HC RX MED GY IP 250 OP 250 PS 637: Performed by: PSYCHIATRY & NEUROLOGY

## 2020-10-27 PROCEDURE — 250N000013 HC RX MED GY IP 250 OP 250 PS 637: Performed by: HOSPITALIST

## 2020-10-27 PROCEDURE — 250N000013 HC RX MED GY IP 250 OP 250 PS 637: Performed by: INTERNAL MEDICINE

## 2020-10-27 RX ADMIN — HALOPERIDOL 7 MG: 5 TABLET ORAL at 08:30

## 2020-10-27 RX ADMIN — POLYETHYLENE GLYCOL 3350 17 G: 17 POWDER, FOR SOLUTION ORAL at 08:30

## 2020-10-27 RX ADMIN — Medication 0.25 MG: at 20:26

## 2020-10-27 RX ADMIN — ASPIRIN 81 MG: 81 TABLET, COATED ORAL at 08:30

## 2020-10-27 RX ADMIN — SALMETEROL XINAFOATE 1 PUFF: 50 POWDER, METERED ORAL; RESPIRATORY (INHALATION) at 08:30

## 2020-10-27 RX ADMIN — LEVOTHYROXINE SODIUM 88 MCG: 88 TABLET ORAL at 08:30

## 2020-10-27 RX ADMIN — MEMANTINE 5 MG: 5 TABLET ORAL at 08:30

## 2020-10-27 RX ADMIN — SALMETEROL XINAFOATE 1 PUFF: 50 POWDER, METERED ORAL; RESPIRATORY (INHALATION) at 20:25

## 2020-10-27 RX ADMIN — ATORVASTATIN CALCIUM 80 MG: 40 TABLET, FILM COATED ORAL at 20:26

## 2020-10-27 RX ADMIN — VENLAFAXINE HYDROCHLORIDE 75 MG: 75 CAPSULE, EXTENDED RELEASE ORAL at 08:30

## 2020-10-27 RX ADMIN — HALOPERIDOL 7 MG: 5 TABLET ORAL at 20:26

## 2020-10-27 RX ADMIN — DOCUSATE SODIUM 100 MG: 100 CAPSULE, LIQUID FILLED ORAL at 08:30

## 2020-10-27 RX ADMIN — DOCUSATE SODIUM 100 MG: 100 CAPSULE, LIQUID FILLED ORAL at 20:25

## 2020-10-27 RX ADMIN — Medication 0.25 MG: at 08:30

## 2020-10-27 ASSESSMENT — ACTIVITIES OF DAILY LIVING (ADL)
ADLS_ACUITY_SCORE: 13

## 2020-10-27 NOTE — PLAN OF CARE
DATE & TIME: 10/27/20 8354-9090  Cognitive Concerns/ Orientation : A&O to self, flat affect  BEHAVIOR & AGGRESSION TOOL COLOR: Red due to elopement risk (Green on this shift)  CIWA SCORE: NA      ABNL VS/O2: VSS on RA (vitals daily)  MOBILITY: Ind in the room   PAIN MANAGMENT: Denies   DIET: Regular  BOWEL/BLADDER: Continent   ABNL LAB/BG: no new recent labs   SKIN: Scattered bruising; intact   TESTS/PROCEDURES: NA  D/C DAY/GOALS/PLACE: Discharge pending placement   OTHER IMPORTANT INFO: On court hold; door alarm in place.

## 2020-10-27 NOTE — PLAN OF CARE
DATE & TIME: 10/27/20 6494-5770   Cognitive Concerns/ Orientation : Alert to self  BEHAVIOR & AGGRESSION TOOL COLOR: Currently Green (can be aggressive at times).  CIWA SCORE: NA  ABNL VS/O2: Vitals daily  MOBILITY: Ind in room  PAIN MANAGMENT: Denies  DIET: Reg  BOWEL/BLADDER: Up to BR  ABNL LAB/BG: None drawn today  DRAIN/DEVICES: None  TELEMETRY RHYTHM: NA  SKIN: Bruised  TESTS/PROCEDURES: NA  D/C DAY/GOALS/PLACE: Pending placement  OTHER IMPORTANT INFO: Door alarm in place. Court hold in place.

## 2020-10-27 NOTE — PLAN OF CARE
Cognitive Concerns/ Orientation : Alert to self, flat effect  BEHAVIOR & AGGRESSION TOOL COLOR: Red  CIWA SCORE: N/A       ABNL VS/O2: VSS on RA  MOBILITY: Ind in the room   PAIN MANAGMENT: Denies   DIET: Regular-good appetite  BOWEL/BLADDER: Continent   SKIN: Scattered bruising; intact   TESTS/PROCEDURES: N/A  D/C DAY/GOALS/PLACE: pending discharge- placement  OTHER IMPORTANT INFO: on court hold; door alarm in place

## 2020-10-27 NOTE — PROGRESS NOTES
[facility]    HOSPITALIST PROGRESS NOTE :   --------------------------------------------------    Date of Admission:  9/9/2020    Cumulative Summary: John Juárez is a 54 year old male with PMHx of schizophrenia, hx of TBI, alcohol use disorder, COPD, hyperlipidemia and hypothyroidism who was admitted on 9/9/2020 for evaluation of aggressive behavior at his group home. His medical condition remains stable, though his group home is now unwilling to take him back. Hospitalization has been prolonged while arranging for new placement.     Assessment & Plan     Neurocognitive disorder dt hx of TBI and alcohol use disorder  Schizophrenia  Had been residing in group home prior to admission.  Brought to hospital for evaluation of aggressive behaviors. Group home now unwilling to take him back. Has had numerous CODE 21s called this stay. Seen by psych, meds adjusted. Is currently under civil commitment and court hold through [facility] until 7/31/21.  Patient has been undergoing paper work for funding and placement.    -- Patient has been last evaluated by psych on September 29.Patient has been doing well clinically  -- Currently patient is on Cogentin 0.25 mg twice daily, Haldol 7 mg twice daily, Namenda 5 mg daily and Effexor 75 mg p.o. daily, continue current meds   -- PRN zyprexa available for agitation  -- monitor periodic EKGs to ensure QTc remains stable, will check EKG tomorrow morning   -- Patient did have an interview on Thursday with [facility]; paper work filled and emailed; SW following     Hyperlipidemia  -- Chronic and stable on aspirin and statin     COPD; Not O2 dependent.  -- Stable on salmeterol     Hypothyroidism  --Chronic and stable on levothyroxine     Tobacco Use  --Using nicotine patch    Diet: Room Service  Combination Diet Regular Diet Adult; Safe Tray - NO utensils    Valentin Catheter: not present  DVT Prophylaxis: Pneumatic Compression Devices  Code Status:  Full Code    The patient's care was discussed with the Bedside Nurse and Patient.    Disposition Plan   Expected discharge: discharge date is unclear , pending safe disposition plan , SW are following     Autumn Miranda MD    ----------------------------------------------------------------------------------------------------------------------    Interval History    Doing fine, no events overnight; denies chest pain, no SOB, no N/V, no abd pain.     -Data reviewed today: I reviewed all new labs and imaging results over the last 24 hours.    I personally reviewed no images or EKG's today.    Physical Exam   Temp: 97.4  F (36.3  C) Temp src: Oral BP: 100/62 Pulse: 85   Resp: 18 SpO2: 99 % O2 Device: None (Room air)    Vitals:    09/09/20 1754 09/25/20 0632   Weight: 70 kg (154 lb 5.2 oz) 71 kg (156 lb 8 oz)     Vital Signs with Ranges  Temp:  [97.4  F (36.3  C)-98.1  F (36.7  C)] 97.4  F (36.3  C)  Pulse:  [85-86] 85  Resp:  [14-18] 18  BP: (100-105)/(62-72) 100/62  SpO2:  [97 %-99 %] 99 %  I/O last 3 completed shifts:  In: 480 [P.O.:480]  Out: -     GENERAL: Alert , awake and oriented to self only, NAD, cooperative, soft-speaking.   HEENT: Normocephalic. EOMI. No icterus or injection. Nares normal.   LUNGS: Clear to auscultation. No dyspnea at rest.   HEART: Regular rate. Extremities perfused.   ABDOMEN: Soft, nontender, and nondistended. Positive bowel sounds.   EXTREMITIES: No LE edema noted.   NEUROLOGIC: Moves extremities x4 on command. No acute focal neurologic abnormalities noted.     Medications       aspirin  81 mg Oral Daily     atorvastatin  80 mg Oral At Bedtime     benztropine  0.25 mg Oral BID     docusate sodium  100 mg Oral BID     haloperidol  7 mg Oral BID     levothyroxine  88 mcg Oral Daily     memantine  5 mg Oral Daily     nicotine  1 patch Transdermal Daily     nicotine   Transdermal TID     polyethylene glycol  17 g Oral Daily     salmeterol  1 puff Inhalation BID     venlafaxine  75 mg  Oral Daily with breakfast       Data   No lab results found in last 7 days.    Imaging:   No results found for this or any previous visit (from the past 24 hour(s)).

## 2020-10-28 PROCEDURE — 250N000013 HC RX MED GY IP 250 OP 250 PS 637: Performed by: PSYCHIATRY & NEUROLOGY

## 2020-10-28 PROCEDURE — 250N000013 HC RX MED GY IP 250 OP 250 PS 637: Performed by: HOSPITALIST

## 2020-10-28 PROCEDURE — 250N000013 HC RX MED GY IP 250 OP 250 PS 637: Performed by: INTERNAL MEDICINE

## 2020-10-28 PROCEDURE — 120N000001 HC R&B MED SURG/OB

## 2020-10-28 PROCEDURE — 99231 SBSQ HOSP IP/OBS SF/LOW 25: CPT | Performed by: INTERNAL MEDICINE

## 2020-10-28 RX ADMIN — ATORVASTATIN CALCIUM 80 MG: 40 TABLET, FILM COATED ORAL at 21:27

## 2020-10-28 RX ADMIN — POLYETHYLENE GLYCOL 3350 17 G: 17 POWDER, FOR SOLUTION ORAL at 09:05

## 2020-10-28 RX ADMIN — Medication 0.25 MG: at 09:06

## 2020-10-28 RX ADMIN — MEMANTINE 5 MG: 5 TABLET ORAL at 09:06

## 2020-10-28 RX ADMIN — ASPIRIN 81 MG: 81 TABLET, COATED ORAL at 09:06

## 2020-10-28 RX ADMIN — SALMETEROL XINAFOATE 1 PUFF: 50 POWDER, METERED ORAL; RESPIRATORY (INHALATION) at 09:11

## 2020-10-28 RX ADMIN — DOCUSATE SODIUM 100 MG: 100 CAPSULE, LIQUID FILLED ORAL at 09:06

## 2020-10-28 RX ADMIN — VENLAFAXINE HYDROCHLORIDE 75 MG: 75 CAPSULE, EXTENDED RELEASE ORAL at 09:06

## 2020-10-28 RX ADMIN — HALOPERIDOL 7 MG: 5 TABLET ORAL at 09:06

## 2020-10-28 RX ADMIN — LEVOTHYROXINE SODIUM 88 MCG: 88 TABLET ORAL at 09:06

## 2020-10-28 RX ADMIN — Medication 0.25 MG: at 21:27

## 2020-10-28 RX ADMIN — HALOPERIDOL 7 MG: 5 TABLET ORAL at 21:27

## 2020-10-28 ASSESSMENT — ACTIVITIES OF DAILY LIVING (ADL)
ADLS_ACUITY_SCORE: 13

## 2020-10-28 NOTE — PLAN OF CARE
DATE & TIME: 10/27/20 9512-2004           Cognitive Concerns/ Orientation : Alert to self.  BEHAVIOR & AGGRESSION TOOL COLOR: Currently Green (can be aggressive at times).  CIWA SCORE: NA  ABNL VS/O2: Daily VSS  MOBILITY: Ind in room  PAIN MANAGMENT: Denies  DIET: Reg  BOWEL/BLADDER: Up to BR  ABNL LAB/BG: None drawn today  DRAIN/DEVICES: None  TELEMETRY RHYTHM: NA  SKIN: Bruised  TESTS/PROCEDURES: NA  D/C DAY/GOALS/PLACE: Pending placement  OTHER IMPORTANT INFO: Door alarm in place. Court hold in place.

## 2020-10-28 NOTE — PROGRESS NOTES
[facility]    HOSPITALIST PROGRESS NOTE :   --------------------------------------------------    Date of Admission:  9/9/2020    Cumulative Summary: John Juárez is a 54 year old male with PMHx of schizophrenia, hx of TBI, alcohol use disorder, COPD, hyperlipidemia and hypothyroidism who was admitted on 9/9/2020 for evaluation of aggressive behavior at his group home. His medical condition remains stable, though his group home is now unwilling to take him back. Hospitalization has been prolonged while arranging for new placement.     Assessment & Plan     Neurocognitive disorder dt hx of TBI and alcohol use disorder  Schizophrenia  Had been residing in group home prior to admission.  Brought to hospital for evaluation of aggressive behaviors. Group home now unwilling to take him back. Has had numerous CODE 21s called this stay. Seen by psych, meds adjusted. Is currently under civil commitment and court hold through [facility] until 7/31/21.  Patient has been undergoing paper work for funding and placement.    -- Patient has been last evaluated by psych on September 29.Patient has been doing well clinically  -- Currently patient is on Cogentin 0.25 mg twice daily, Haldol 7 mg twice daily, Namenda 5 mg daily and Effexor 75 mg p.o. daily, continue current meds   -- PRN zyprexa available for agitation  -- monitor periodic EKGs to ensure QTc remains stable, will check EKG tomorrow morning   -- Patient did have an interview on Thursday with [facility]; paper work filled and emailed; SW following     Hyperlipidemia  -- Chronic and stable on aspirin and statin     COPD; Not O2 dependent.  -- Stable on salmeterol     Hypothyroidism  --Chronic and stable on levothyroxine     Tobacco Use  --Using nicotine patch    Diet: Room Service  Combination Diet Regular Diet Adult; Safe Tray - NO utensils    Valentin Catheter: not present  DVT Prophylaxis: Pneumatic Compression Devices  Code Status:  Full Code    The patient's care was discussed with the Bedside Nurse and Patient.    Disposition Plan   Expected discharge: discharge date is unclear , pending safe disposition plan , SW are following     Autumn Miranda MD    ----------------------------------------------------------------------------------------------------------------------    Interval History    Doing fine, sleeping, woke up, no new complains.    -Data reviewed today: I reviewed all new labs and imaging results over the last 24 hours.    I personally reviewed no images or EKG's today.    Physical Exam   Temp: 97.7  F (36.5  C) Temp src: Oral BP: 95/65 Pulse: 71   Resp: 18 SpO2: 99 % O2 Device: None (Room air)    Vitals:    09/09/20 1754 09/25/20 0632   Weight: 70 kg (154 lb 5.2 oz) 71 kg (156 lb 8 oz)     Vital Signs with Ranges  Temp:  [97.7  F (36.5  C)] 97.7  F (36.5  C)  Pulse:  [71] 71  Resp:  [18] 18  BP: (95)/(65) 95/65  SpO2:  [99 %] 99 %  I/O last 3 completed shifts:  In: 480 [P.O.:480]  Out: -     GENERAL: Alert , awake and oriented to self only, NAD, cooperative, soft-speaking.   HEENT: Normocephalic. EOMI. No icterus or injection. Nares normal.   LUNGS: Clear to auscultation. No dyspnea at rest.   HEART: Regular rate. Extremities perfused.   ABDOMEN: Soft, nontender, and nondistended. Positive bowel sounds.   EXTREMITIES: No LE edema noted.   NEUROLOGIC: Moves extremities x4 on command. No acute focal neurologic abnormalities noted.     Medications       aspirin  81 mg Oral Daily     atorvastatin  80 mg Oral At Bedtime     benztropine  0.25 mg Oral BID     docusate sodium  100 mg Oral BID     haloperidol  7 mg Oral BID     levothyroxine  88 mcg Oral Daily     memantine  5 mg Oral Daily     nicotine  1 patch Transdermal Daily     nicotine   Transdermal TID     polyethylene glycol  17 g Oral Daily     salmeterol  1 puff Inhalation BID     venlafaxine  75 mg Oral Daily with breakfast       Data   No lab results found in last 7  days.    Imaging:   No results found for this or any previous visit (from the past 24 hour(s)).

## 2020-10-28 NOTE — PLAN OF CARE
DATE & TIME: 10/28/20 3933-5086          Cognitive Concerns/ Orientation : A&O to self, flat affect.  BEHAVIOR & AGGRESSION TOOL COLOR: Currently Green (can be aggressive at times).  CIWA SCORE: NA  ABNL VS/O2: Daily VSS  MOBILITY: Ind in room  PAIN MANAGMENT: Denies  DIET: Reg  BOWEL/BLADDER: Up to BR  ABNL LAB/BG: None new.  DRAIN/DEVICES: None  TELEMETRY RHYTHM: NA  SKIN: Bruised  TESTS/PROCEDURES: NA  D/C DAY/GOALS/PLACE: Pending placement  OTHER IMPORTANT INFO: Door alarm in place. Court hold in place.

## 2020-10-28 NOTE — PLAN OF CARE
DATE & TIME: 10/28/20 AM shift         Cognitive Concerns/ Orientation : A&O to self, flat affect.  BEHAVIOR & AGGRESSION TOOL COLOR:Green, calm and cooperative. Tried to come out of his room once this shift, door alarm in place, pt is easily redirectable. Hx of aggressive behavior and agitation. Multiple code 21 on this admission, meds adjusted by psych, pt has been doing better now.   CIWA SCORE: NA  ABNL VS/O2: VSS on RA, BP soft this morning 90s/60, denies dizziness.   MOBILITY: Ind in room, door alarm in place.   PAIN MANAGMENT: Denies  DIET: Regular, eating well. Denies nausea.  BOWEL/BLADDER: Up to BR. Continent. No BM this shift.   ABNL LAB/BG: n/a  DRAIN/DEVICES: None  TELEMETRY RHYTHM: NA  SKIN: Bruises and scabs.   TESTS/PROCEDURES: NA  D/C DAY/GOALS/PLACE: Pending placement  OTHER IMPORTANT INFO:civil commitment and court hold in place till 7/31/2021. SW is following

## 2020-10-29 PROCEDURE — 120N000001 HC R&B MED SURG/OB

## 2020-10-29 PROCEDURE — 99231 SBSQ HOSP IP/OBS SF/LOW 25: CPT | Performed by: STUDENT IN AN ORGANIZED HEALTH CARE EDUCATION/TRAINING PROGRAM

## 2020-10-29 PROCEDURE — 250N000013 HC RX MED GY IP 250 OP 250 PS 637: Performed by: PSYCHIATRY & NEUROLOGY

## 2020-10-29 PROCEDURE — 250N000013 HC RX MED GY IP 250 OP 250 PS 637: Performed by: HOSPITALIST

## 2020-10-29 PROCEDURE — 250N000013 HC RX MED GY IP 250 OP 250 PS 637: Performed by: INTERNAL MEDICINE

## 2020-10-29 RX ADMIN — HALOPERIDOL 7 MG: 5 TABLET ORAL at 21:02

## 2020-10-29 RX ADMIN — SALMETEROL XINAFOATE 1 PUFF: 50 POWDER, METERED ORAL; RESPIRATORY (INHALATION) at 13:06

## 2020-10-29 RX ADMIN — ASPIRIN 81 MG: 81 TABLET, COATED ORAL at 09:50

## 2020-10-29 RX ADMIN — Medication 0.25 MG: at 21:02

## 2020-10-29 RX ADMIN — SALMETEROL XINAFOATE 1 PUFF: 50 POWDER, METERED ORAL; RESPIRATORY (INHALATION) at 21:02

## 2020-10-29 RX ADMIN — HALOPERIDOL 7 MG: 5 TABLET ORAL at 09:52

## 2020-10-29 RX ADMIN — DOCUSATE SODIUM 100 MG: 100 CAPSULE, LIQUID FILLED ORAL at 09:51

## 2020-10-29 RX ADMIN — Medication 0.25 MG: at 09:51

## 2020-10-29 RX ADMIN — VENLAFAXINE HYDROCHLORIDE 75 MG: 75 CAPSULE, EXTENDED RELEASE ORAL at 09:51

## 2020-10-29 RX ADMIN — MEMANTINE 5 MG: 5 TABLET ORAL at 09:52

## 2020-10-29 RX ADMIN — ATORVASTATIN CALCIUM 80 MG: 40 TABLET, FILM COATED ORAL at 21:01

## 2020-10-29 RX ADMIN — DOCUSATE SODIUM 100 MG: 100 CAPSULE, LIQUID FILLED ORAL at 21:01

## 2020-10-29 RX ADMIN — POLYETHYLENE GLYCOL 3350 17 G: 17 POWDER, FOR SOLUTION ORAL at 09:52

## 2020-10-29 RX ADMIN — LEVOTHYROXINE SODIUM 88 MCG: 88 TABLET ORAL at 09:52

## 2020-10-29 ASSESSMENT — ACTIVITIES OF DAILY LIVING (ADL)
ADLS_ACUITY_SCORE: 13

## 2020-10-29 NOTE — PLAN OF CARE
8847-3071:  Pt is calm/cooperative this shift. VS stable. Denies pain. Pt ate most of his dinner. Up independently in his room. Door alarm in place.

## 2020-10-29 NOTE — PLAN OF CARE
7360-9340: Patient alert to self, place (knows he is in a hospital). VSS on room air, except soft BP.Flat affect, calm, cooperative this shift. Tolerating regular diet, good appetite. Door alarm maintained. Discharge pending placement.

## 2020-10-29 NOTE — PLAN OF CARE
DATE & TIME: 10/28-29 2060-4866   Cognitive Concerns/ Orientation : A&O to self, flat affect.  BEHAVIOR & AGGRESSION TOOL COLOR: Green, calm and cooperative. Door alarm on. Pt slept well and stayed in room overnight. Hx of aggressive behavior and agitation. Multiple code 21 on this admission, meds adjusted by psych, pt has been doing better now.   CIWA SCORE: NA  ABNL VS/O2: VSS. Only take VS once daily.  MOBILITY: Ind in room, door alarm in place.   PAIN MANAGMENT: Denies any pain.   DIET: Regular, eating well. Denies nausea.  BOWEL/BLADDER: Continent. BM regular.   ABNL LAB/BG: n/a  DRAIN/DEVICES: None  TELEMETRY RHYTHM: NA  SKIN: Scattered bruises and scabs.   TESTS/PROCEDURES: NA  D/C DAY/GOALS/PLACE: Pending placement  OTHER IMPORTANT INFO: Civil commitment and court hold in place till 7/31/2021. SW is following

## 2020-10-29 NOTE — PROGRESS NOTES
[facility]    HOSPITALIST PROGRESS NOTE :   --------------------------------------------------    Date of Admission:  9/9/2020    Cumulative Summary: John Juárez is a 54 year old male with PMHx of schizophrenia, hx of TBI, alcohol use disorder, COPD, hyperlipidemia and hypothyroidism who was admitted on 9/9/2020 for evaluation of aggressive behavior at his group home. His medical condition remains stable, though his group home is now unwilling to take him back. Hospitalization has been prolonged while arranging for new placement.     Assessment & Plan     Neurocognitive disorder dt hx of TBI and alcohol use disorder  Schizophrenia  Had been residing in group home prior to admission.  Brought to hospital for evaluation of aggressive behaviors. Group home now unwilling to take him back. Has had numerous CODE 21s called this stay. Seen by psych, meds adjusted. Is currently under civil commitment and court hold through [facility] until 7/31/21.  Patient has been undergoing paper work for funding and placement.    -- Patient has been last evaluated by psych on September 29.Patient has been doing well clinically  -- Currently patient is on Cogentin 0.25 mg twice daily, Haldol 7 mg twice daily, Namenda 5 mg daily and Effexor 75 mg p.o. daily, continue current meds   -- PRN zyprexa available for agitation  -- Patient did have an interview on Thursday 10/22 with [facility]; paper work filled and emailed; SW following     Hyperlipidemia  -- Chronic and stable on aspirin and statin     COPD; Not O2 dependent.  -- Stable on salmeterol     Hypothyroidism  --Chronic and stable on levothyroxine     Tobacco Use  --Using nicotine patch    Diet: Room Service  Combination Diet Regular Diet Adult; Safe Tray - NO utensils    Valentin Catheter: not present  DVT Prophylaxis: Pneumatic Compression Devices  Code Status: Full Code    The patient's care was discussed with the Bedside Nurse and  Patient.    Disposition Plan   Expected discharge: discharge date is unclear , pending safe disposition plan , SW are following     Linda Oleary MD    ----------------------------------------------------------------------------------------------------------------------    Interval History    Patient resting comfortably in bed. Obvious tremor noted. Denies pain. Ate large breakfast. No cough. Breathing stable. No edema.    -Data reviewed today: I reviewed all new labs and imaging results over the last 24 hours.    I personally reviewed no images or EKG's today.    Physical Exam   Temp: 97.2  F (36.2  C) Temp src: Oral BP: 90/57 Pulse: 57   Resp: 16 SpO2: 98 % O2 Device: None (Room air)    Vitals:    09/09/20 1754 09/25/20 0632   Weight: 70 kg (154 lb 5.2 oz) 71 kg (156 lb 8 oz)     Vital Signs with Ranges  Temp:  [97.2  F (36.2  C)-98  F (36.7  C)] 97.2  F (36.2  C)  Pulse:  [57-71] 57  Resp:  [16-18] 16  BP: ()/(57-73) 90/57  SpO2:  [98 %] 98 %  I/O last 3 completed shifts:  In: 720 [P.O.:720]  Out: -     GENERAL: NAD, cooperative, soft-speaking.   HEENT: Normocephalic. EOMI. No icterus or injection. Nares normal.   LUNGS: Clear to auscultation. No dyspnea at rest. large midline incision  HEART: Regular rate. Extremities perfused.   ABDOMEN: Soft, nontender, and nondistended. Positive bowel sounds.   EXTREMITIES: No LE edema noted.   NEUROLOGIC: Moves extremities x4 on command. Tremor at rest noted in UE    Medications       aspirin  81 mg Oral Daily     atorvastatin  80 mg Oral At Bedtime     benztropine  0.25 mg Oral BID     docusate sodium  100 mg Oral BID     haloperidol  7 mg Oral BID     levothyroxine  88 mcg Oral Daily     memantine  5 mg Oral Daily     nicotine  1 patch Transdermal Daily     nicotine   Transdermal TID     polyethylene glycol  17 g Oral Daily     salmeterol  1 puff Inhalation BID     venlafaxine  75 mg Oral Daily with breakfast       Data   No lab results found in last 7  days.    Imaging:   No results found for this or any previous visit (from the past 24 hour(s)).

## 2020-10-30 PROCEDURE — 120N000001 HC R&B MED SURG/OB

## 2020-10-30 PROCEDURE — 250N000013 HC RX MED GY IP 250 OP 250 PS 637: Performed by: INTERNAL MEDICINE

## 2020-10-30 PROCEDURE — 250N000013 HC RX MED GY IP 250 OP 250 PS 637: Performed by: PSYCHIATRY & NEUROLOGY

## 2020-10-30 PROCEDURE — 99231 SBSQ HOSP IP/OBS SF/LOW 25: CPT | Performed by: STUDENT IN AN ORGANIZED HEALTH CARE EDUCATION/TRAINING PROGRAM

## 2020-10-30 PROCEDURE — 250N000013 HC RX MED GY IP 250 OP 250 PS 637: Performed by: HOSPITALIST

## 2020-10-30 PROCEDURE — 250N000013 HC RX MED GY IP 250 OP 250 PS 637: Performed by: NURSE PRACTITIONER

## 2020-10-30 RX ADMIN — HALOPERIDOL 7 MG: 5 TABLET ORAL at 20:45

## 2020-10-30 RX ADMIN — MEMANTINE 5 MG: 5 TABLET ORAL at 08:17

## 2020-10-30 RX ADMIN — SALMETEROL XINAFOATE 1 PUFF: 50 POWDER, METERED ORAL; RESPIRATORY (INHALATION) at 20:45

## 2020-10-30 RX ADMIN — LEVOTHYROXINE SODIUM 88 MCG: 88 TABLET ORAL at 08:16

## 2020-10-30 RX ADMIN — VENLAFAXINE HYDROCHLORIDE 75 MG: 75 CAPSULE, EXTENDED RELEASE ORAL at 08:17

## 2020-10-30 RX ADMIN — Medication 0.25 MG: at 20:45

## 2020-10-30 RX ADMIN — ASPIRIN 81 MG: 81 TABLET, COATED ORAL at 08:17

## 2020-10-30 RX ADMIN — POLYETHYLENE GLYCOL 3350 17 G: 17 POWDER, FOR SOLUTION ORAL at 08:17

## 2020-10-30 RX ADMIN — NICOTINE 1 PATCH: 21 PATCH, EXTENDED RELEASE TRANSDERMAL at 08:17

## 2020-10-30 RX ADMIN — SALMETEROL XINAFOATE 1 PUFF: 50 POWDER, METERED ORAL; RESPIRATORY (INHALATION) at 08:16

## 2020-10-30 RX ADMIN — Medication 0.25 MG: at 08:17

## 2020-10-30 RX ADMIN — ATORVASTATIN CALCIUM 80 MG: 40 TABLET, FILM COATED ORAL at 20:45

## 2020-10-30 RX ADMIN — DOCUSATE SODIUM 100 MG: 100 CAPSULE, LIQUID FILLED ORAL at 20:45

## 2020-10-30 RX ADMIN — DOCUSATE SODIUM 100 MG: 100 CAPSULE, LIQUID FILLED ORAL at 08:17

## 2020-10-30 RX ADMIN — HALOPERIDOL 7 MG: 5 TABLET ORAL at 08:17

## 2020-10-30 ASSESSMENT — ACTIVITIES OF DAILY LIVING (ADL)
ADLS_ACUITY_SCORE: 13

## 2020-10-30 NOTE — PLAN OF CARE
DATE & TIME: 10/30/2020 0434-1081    Cognitive Concerns/ Orientation : Alert to self only.    BEHAVIOR & AGGRESSION TOOL COLOR: green, came out of room (door alarm went off) once for drinks. Able to be redirected back to room.   CIWA SCORE: na    ABNL VS/O2: vss, on RA, daily VS.   MOBILITY: IND in room   PAIN MANAGMENT: denied pain.   DIET: regular.   BOWEL/BLADDER: continent.   ABNL LAB/BG: na   DRAIN/DEVICES: na   TELEMETRY RHYTHM: na   SKIN: intact.   TESTS/PROCEDURES: na   D/C DAY/GOALS/PLACE: pending placement.   OTHER IMPORTANT INFO: court hold, door alarm on.   MD/RN ROUNDING SIGNED OFF D/E SHIFT: yes   COMMIT TO SIT DONE AND SIGNED OFF yes

## 2020-10-30 NOTE — PLAN OF CARE
COLOR: Green, calm and cooperative. Door alarm on. Pt slept well and stayed in room overnight. Hx of aggressive behavior and agitation. Multiple code 21 on this admission, meds adjusted by psych, pt has been doing better now.   CIWA SCORE: NA  ABNL VS/O2: VSS. Only take VS once daily.  MOBILITY: Ind in room, door alarm in place.   PAIN MANAGMENT: Denies any pain.   DIET: Regular, eating well. Denies nausea.  BOWEL/BLADDER: Continent. BM regular.   ABNL LAB/BG: n/a  DRAIN/DEVICES: None  TELEMETRY RHYTHM: NA  SKIN: Scattered bruises and scabs.   TESTS/PROCEDURES: NA  D/C DAY/GOALS/PLACE: Pending placement  OTHER IMPORTANT INFO: Civil commitment and court hold in place till 7/31/2021. DAVINA is following

## 2020-10-30 NOTE — PROGRESS NOTES
[facility]    HOSPITALIST PROGRESS NOTE :   --------------------------------------------------    Date of Admission:  9/9/2020    Cumulative Summary: John Juárez is a 54 year old male with PMHx of schizophrenia, hx of TBI, alcohol use disorder, COPD, hyperlipidemia and hypothyroidism who was admitted on 9/9/2020 for evaluation of aggressive behavior at his group home. His medical condition remains stable, though his group home is now unwilling to take him back. Hospitalization has been prolonged while arranging for new placement.     Assessment & Plan     Neurocognitive disorder dt hx of TBI and alcohol use disorder  Schizophrenia  Had been residing in group home prior to admission.  Brought to hospital for evaluation of aggressive behaviors. Group home now unwilling to take him back. Has had numerous CODE 21s called this stay. Seen by psych, meds adjusted. Is currently under civil commitment and court hold through [facility] until 7/31/21.  Patient has been undergoing paper work for funding and placement.    -- Patient has been last evaluated by psych on September 29.Patient has been doing well clinically  -- Currently patient is on Cogentin 0.25 mg twice daily, Haldol 7 mg twice daily, Namenda 5 mg daily and Effexor 75 mg p.o. daily, continue current meds   -- PRN zyprexa available for agitation has not needed   -- Patient did have an interview on Thursday 10/22 with [facility]; paper work filled and emailed; SW following     Hyperlipidemia  -- Chronic and stable on aspirin and statin     COPD; Not O2 dependent.  -- Stable on salmeterol     Hypothyroidism  --Chronic and stable on levothyroxine     Tobacco Use  --Using nicotine patch    Diet: Room Service  Combination Diet Regular Diet Adult; Safe Tray - NO utensils    Valentin Catheter: not present  DVT Prophylaxis: Pneumatic Compression Devices  Code Status: Full Code    The patient's care was discussed with the Bedside Nurse  and Patient.    Disposition Plan   Expected discharge: discharge date is unclear , pending safe disposition plan , SW are following     Linda Oleary MD    ----------------------------------------------------------------------------------------------------------------------    Interval History    Patient walking out of the bathroom today. Denies any pain. Breathing well. Asking when he can leave. No gait abnormalities and tremor not present today.    -Data reviewed today: I reviewed all new labs and imaging results over the last 24 hours.    I personally reviewed no images or EKG's today.    Physical Exam   Temp: 97.3  F (36.3  C) Temp src: Oral BP: 93/64 Pulse: 70   Resp: 16 SpO2: 96 % O2 Device: None (Room air)    Vitals:    09/09/20 1754 09/25/20 0632   Weight: 70 kg (154 lb 5.2 oz) 71 kg (156 lb 8 oz)     Vital Signs with Ranges  Temp:  [97.3  F (36.3  C)-97.6  F (36.4  C)] 97.3  F (36.3  C)  Pulse:  [70-80] 70  Resp:  [16] 16  BP: (93)/(64-66) 93/64  SpO2:  [96 %-97 %] 96 %  I/O last 3 completed shifts:  In: 240 [P.O.:240]  Out: -     GENERAL: NAD, cooperative, soft-speaking.   HEENT: Normocephalic. EOMI. No icterus or injection. Nares normal.   LUNGS: Clear to auscultation. No dyspnea at rest. large midline incision  HEART: Regular rate. Extremities perfused.   ABDOMEN: Soft, nontender, and nondistended. Positive bowel sounds.   EXTREMITIES: No LE edema noted.   NEUROLOGIC: gait normal, no tremor noted, no focal weakness    Medications       aspirin  81 mg Oral Daily     atorvastatin  80 mg Oral At Bedtime     benztropine  0.25 mg Oral BID     docusate sodium  100 mg Oral BID     haloperidol  7 mg Oral BID     levothyroxine  88 mcg Oral Daily     memantine  5 mg Oral Daily     nicotine  1 patch Transdermal Daily     nicotine   Transdermal TID     polyethylene glycol  17 g Oral Daily     salmeterol  1 puff Inhalation BID     venlafaxine  75 mg Oral Daily with breakfast       Data   No lab results found in  last 7 days.    Imaging:   No results found for this or any previous visit (from the past 24 hour(s)).

## 2020-10-31 PROCEDURE — 99231 SBSQ HOSP IP/OBS SF/LOW 25: CPT | Performed by: STUDENT IN AN ORGANIZED HEALTH CARE EDUCATION/TRAINING PROGRAM

## 2020-10-31 PROCEDURE — 250N000013 HC RX MED GY IP 250 OP 250 PS 637: Performed by: HOSPITALIST

## 2020-10-31 PROCEDURE — 250N000013 HC RX MED GY IP 250 OP 250 PS 637: Performed by: PSYCHIATRY & NEUROLOGY

## 2020-10-31 PROCEDURE — 250N000013 HC RX MED GY IP 250 OP 250 PS 637: Performed by: INTERNAL MEDICINE

## 2020-10-31 PROCEDURE — 250N000013 HC RX MED GY IP 250 OP 250 PS 637: Performed by: NURSE PRACTITIONER

## 2020-10-31 PROCEDURE — 120N000001 HC R&B MED SURG/OB

## 2020-10-31 RX ADMIN — DOCUSATE SODIUM 100 MG: 100 CAPSULE, LIQUID FILLED ORAL at 08:55

## 2020-10-31 RX ADMIN — SALMETEROL XINAFOATE 1 PUFF: 50 POWDER, METERED ORAL; RESPIRATORY (INHALATION) at 08:56

## 2020-10-31 RX ADMIN — HALOPERIDOL 7 MG: 5 TABLET ORAL at 20:05

## 2020-10-31 RX ADMIN — DOCUSATE SODIUM 100 MG: 100 CAPSULE, LIQUID FILLED ORAL at 20:05

## 2020-10-31 RX ADMIN — NICOTINE 1 PATCH: 21 PATCH, EXTENDED RELEASE TRANSDERMAL at 08:55

## 2020-10-31 RX ADMIN — ATORVASTATIN CALCIUM 80 MG: 40 TABLET, FILM COATED ORAL at 20:05

## 2020-10-31 RX ADMIN — HALOPERIDOL 7 MG: 5 TABLET ORAL at 08:55

## 2020-10-31 RX ADMIN — SALMETEROL XINAFOATE 1 PUFF: 50 POWDER, METERED ORAL; RESPIRATORY (INHALATION) at 20:07

## 2020-10-31 RX ADMIN — LEVOTHYROXINE SODIUM 88 MCG: 88 TABLET ORAL at 08:55

## 2020-10-31 RX ADMIN — Medication 0.25 MG: at 08:55

## 2020-10-31 RX ADMIN — ASPIRIN 81 MG: 81 TABLET, COATED ORAL at 08:55

## 2020-10-31 RX ADMIN — MEMANTINE 5 MG: 5 TABLET ORAL at 08:55

## 2020-10-31 RX ADMIN — Medication 0.25 MG: at 20:05

## 2020-10-31 RX ADMIN — VENLAFAXINE HYDROCHLORIDE 75 MG: 75 CAPSULE, EXTENDED RELEASE ORAL at 08:55

## 2020-10-31 RX ADMIN — POLYETHYLENE GLYCOL 3350 17 G: 17 POWDER, FOR SOLUTION ORAL at 08:54

## 2020-10-31 ASSESSMENT — ACTIVITIES OF DAILY LIVING (ADL)
ADLS_ACUITY_SCORE: 13

## 2020-10-31 NOTE — PLAN OF CARE
DATE & TIME: 10/31/2020 0607-6462                      Cognitive Concerns/ Orientation : Alert to self only.    BEHAVIOR & AGGRESSION TOOL COLOR: green  CIWA SCORE: na      ABNL VS/O2: vss, on RA, daily VS.   MOBILITY: IND in room   PAIN MANAGMENT: denied pain.   DIET: regular.   BOWEL/BLADDER: continent.   ABNL LAB/BG: na   DRAIN/DEVICES: na   TELEMETRY RHYTHM: na   SKIN: intact.   TESTS/PROCEDURES: na   D/C DAY/GOALS/PLACE: pending placement.   OTHER IMPORTANT INFO: court hold, door alarm on.   MD/RN ROUNDING SIGNED OFF D/E SHIFT: yes   COMMIT TO SIT DONE AND SIGNED OFF yes

## 2020-10-31 NOTE — PLAN OF CARE
DATE & TIME: 10/30/2020 NIGHT SHIFT   Cognitive Concerns/ Orientation : Alert to self only, talks in hoarse whisper  BEHAVIOR & AGGRESSION TOOL COLOR: green  CIWA SCORE: na    ABNL VS/O2: daily VS, not done this shift  MOBILITY: IND in room, door alarm on  PAIN MANAGMENT: denied pain.   DIET: regular.   BOWEL/BLADDER: continent.   ABNL LAB/BG: na   DRAIN/DEVICES: na   TELEMETRY RHYTHM: na   SKIN: intact.   TESTS/PROCEDURES: na   D/C DAY/GOALS/PLACE: pending placement.   OTHER IMPORTANT INFO: court hold, door alarm on.   MD/RN ROUNDING SIGNED OFF D/E SHIFT: yes   COMMIT TO SIT DONE AND SIGNED OFF yes

## 2020-10-31 NOTE — PROGRESS NOTES
[facility]    HOSPITALIST PROGRESS NOTE :   --------------------------------------------------    Date of Admission:  9/9/2020    Cumulative Summary: John Juárez is a 54 year old male with PMHx of schizophrenia, hx of TBI, alcohol use disorder, COPD, hyperlipidemia and hypothyroidism who was admitted on 9/9/2020 for evaluation of aggressive behavior at his group home. His medical condition remains stable, though his group home is now unwilling to take him back. Hospitalization has been prolonged while arranging for new placement.     Assessment & Plan     Neurocognitive disorder dt hx of TBI and alcohol use disorder  Schizophrenia  Had been residing in group home prior to admission.  Brought to hospital for evaluation of aggressive behaviors. Group home now unwilling to take him back. Has had numerous CODE 21s called this stay. Seen by psych, meds adjusted. Is currently under civil commitment and court hold through [facility] until 7/31/21.  Patient has been undergoing paper work for funding and placement.    -- Patient has been last evaluated by psych on September 29.Patient has been doing well clinically  -- Currently patient is on Cogentin 0.25 mg twice daily, Haldol 7 mg twice daily, Namenda 5 mg daily and Effexor 75 mg p.o. daily, continue current meds   -- PRN zyprexa and haldol available for agitation has not needed any PRNs since 10/22  -- Patient did have an interview on Thursday 10/22 with [facility]; paper work filled and emailed; SW following     Hyperlipidemia  -- Chronic and stable on aspirin and statin     COPD; Not O2 dependent.  -- Stable on salmeterol     Hypothyroidism  --Chronic and stable on levothyroxine     Tobacco Use  --Using nicotine patch    Diet: Room Service  Combination Diet Regular Diet Adult; Safe Tray - NO utensils    Valentin Catheter: not present  DVT Prophylaxis: Pneumatic Compression Devices  Code Status: Full Code    The patient's care was  discussed with the Bedside Nurse and Patient.    Disposition Plan   Expected discharge: discharge date is unclear , pending safe disposition plan , SW are following     Linda Oleary MD    ----------------------------------------------------------------------------------------------------------------------    Interval History    Sleeping. Comfortable. Does wake up. Denies pain or nausea. No issues breathing. Couldn't believe it was already halloween.     -Data reviewed today: I reviewed all new labs and imaging results over the last 24 hours.    I personally reviewed no images or EKG's today.    Physical Exam   Temp: 98.3  F (36.8  C) Temp src: Oral BP: 100/72 Pulse: 73   Resp: 16 SpO2: 99 % O2 Device: None (Room air)    Vitals:    09/09/20 1754 09/25/20 0632   Weight: 70 kg (154 lb 5.2 oz) 71 kg (156 lb 8 oz)     Vital Signs with Ranges  Temp:  [98.3  F (36.8  C)] 98.3  F (36.8  C)  Pulse:  [73] 73  Resp:  [16] 16  BP: (100)/(72) 100/72  SpO2:  [99 %] 99 %  I/O last 3 completed shifts:  In: 240 [P.O.:240]  Out: -     GENERAL: NAD, cooperative, soft-speaking/hoarse voice  HEENT: Normocephalic. EOMI. No icterus or injection. Nares normal.   LUNGS: Clear to auscultation. No dyspnea at rest. large midline incision  HEART: Regular rate. Extremities perfused.   ABDOMEN: Soft, nontender, and nondistended. Positive bowel sounds.   EXTREMITIES: No LE edema noted.   NEUROLOGIC: moving all extremities without focal weakness    Medications       aspirin  81 mg Oral Daily     atorvastatin  80 mg Oral At Bedtime     benztropine  0.25 mg Oral BID     docusate sodium  100 mg Oral BID     haloperidol  7 mg Oral BID     levothyroxine  88 mcg Oral Daily     memantine  5 mg Oral Daily     nicotine  1 patch Transdermal Daily     nicotine   Transdermal TID     polyethylene glycol  17 g Oral Daily     salmeterol  1 puff Inhalation BID     venlafaxine  75 mg Oral Daily with breakfast       Data   No lab results found in last 7  days.    Imaging:   No results found for this or any previous visit (from the past 24 hour(s)).

## 2020-11-01 PROCEDURE — 99231 SBSQ HOSP IP/OBS SF/LOW 25: CPT | Performed by: STUDENT IN AN ORGANIZED HEALTH CARE EDUCATION/TRAINING PROGRAM

## 2020-11-01 PROCEDURE — 250N000013 HC RX MED GY IP 250 OP 250 PS 637: Performed by: HOSPITALIST

## 2020-11-01 PROCEDURE — 250N000013 HC RX MED GY IP 250 OP 250 PS 637: Performed by: NURSE PRACTITIONER

## 2020-11-01 PROCEDURE — 250N000013 HC RX MED GY IP 250 OP 250 PS 637: Performed by: PSYCHIATRY & NEUROLOGY

## 2020-11-01 PROCEDURE — 120N000001 HC R&B MED SURG/OB

## 2020-11-01 PROCEDURE — 250N000013 HC RX MED GY IP 250 OP 250 PS 637: Performed by: INTERNAL MEDICINE

## 2020-11-01 RX ADMIN — SALMETEROL XINAFOATE 1 PUFF: 50 POWDER, METERED ORAL; RESPIRATORY (INHALATION) at 07:48

## 2020-11-01 RX ADMIN — Medication 0.25 MG: at 07:49

## 2020-11-01 RX ADMIN — OLANZAPINE 10 MG: 5 TABLET, ORALLY DISINTEGRATING ORAL at 14:15

## 2020-11-01 RX ADMIN — SALMETEROL XINAFOATE 1 PUFF: 50 POWDER, METERED ORAL; RESPIRATORY (INHALATION) at 20:45

## 2020-11-01 RX ADMIN — DOCUSATE SODIUM 100 MG: 100 CAPSULE, LIQUID FILLED ORAL at 07:49

## 2020-11-01 RX ADMIN — HALOPERIDOL 7 MG: 5 TABLET ORAL at 20:44

## 2020-11-01 RX ADMIN — HALOPERIDOL 7 MG: 5 TABLET ORAL at 07:49

## 2020-11-01 RX ADMIN — VENLAFAXINE HYDROCHLORIDE 75 MG: 75 CAPSULE, EXTENDED RELEASE ORAL at 07:49

## 2020-11-01 RX ADMIN — ATORVASTATIN CALCIUM 80 MG: 40 TABLET, FILM COATED ORAL at 20:45

## 2020-11-01 RX ADMIN — POLYETHYLENE GLYCOL 3350 17 G: 17 POWDER, FOR SOLUTION ORAL at 07:49

## 2020-11-01 RX ADMIN — NICOTINE 1 PATCH: 21 PATCH, EXTENDED RELEASE TRANSDERMAL at 07:53

## 2020-11-01 RX ADMIN — DOCUSATE SODIUM 100 MG: 100 CAPSULE, LIQUID FILLED ORAL at 20:44

## 2020-11-01 RX ADMIN — MEMANTINE 5 MG: 5 TABLET ORAL at 07:49

## 2020-11-01 RX ADMIN — Medication 0.25 MG: at 20:44

## 2020-11-01 RX ADMIN — LEVOTHYROXINE SODIUM 88 MCG: 88 TABLET ORAL at 07:49

## 2020-11-01 RX ADMIN — ASPIRIN 81 MG: 81 TABLET, COATED ORAL at 07:49

## 2020-11-01 ASSESSMENT — ACTIVITIES OF DAILY LIVING (ADL)
ADLS_ACUITY_SCORE: 13

## 2020-11-01 NOTE — PROGRESS NOTES
[facility]    HOSPITALIST PROGRESS NOTE :   --------------------------------------------------    Date of Admission:  9/9/2020    Cumulative Summary: John Juárez is a 54 year old male with PMHx of schizophrenia, hx of TBI, alcohol use disorder, COPD, hyperlipidemia and hypothyroidism who was admitted on 9/9/2020 for evaluation of aggressive behavior at his group home. His medical condition remains stable, though his group home is now unwilling to take him back. Hospitalization has been prolonged while arranging for new placement.     Assessment & Plan     Neurocognitive disorder dt hx of TBI and alcohol use disorder  Schizophrenia  Had been residing in group home prior to admission.  Brought to hospital for evaluation of aggressive behaviors. Group home now unwilling to take him back. Has had numerous CODE 21s called this stay. Seen by psych, meds adjusted. Is currently under civil commitment and court hold through [facility] until 7/31/21.  Patient has been undergoing paper work for funding and placement.    -- Patient has been last evaluated by psych on September 29.Patient has been doing well clinically  -- Currently patient is on Cogentin 0.25 mg twice daily, Haldol 7 mg twice daily, Namenda 5 mg daily and Effexor 75 mg p.o. daily, continue current meds   -- PRN zyprexa and haldol available for agitation has not needed any PRNs since 10/22  -- Patient did have an interview on Thursday 10/22 with [facility]; paper work filled and emailed; SW following     Hyperlipidemia  -- Chronic and stable on aspirin and statin     COPD; Not O2 dependent.  -- Stable on salmeterol     Hypothyroidism  --Chronic and stable on levothyroxine     Tobacco Use  --Using nicotine patch    Diet: Room Service  Combination Diet Regular Diet Adult; Safe Tray - NO utensils    Valentin Catheter: not present  DVT Prophylaxis: Pneumatic Compression Devices  Code Status: Full Code    The patient's care was  discussed with the patient.    Disposition Plan   Expected discharge: discharge date is unclear , pending safe disposition plan , SW are following     Linda Oleary MD    ----------------------------------------------------------------------------------------------------------------------    Interval History    Denies any issues eating urinating or constipation. Likes the diet here. Comfortable.    -Data reviewed today: I reviewed all new labs and imaging results over the last 24 hours.    I personally reviewed no images or EKG's today.    Physical Exam   Temp: 97.7  F (36.5  C) Temp src: Oral BP: 101/69 Pulse: 77   Resp: 16 SpO2: 97 % O2 Device: None (Room air)    Vitals:    09/09/20 1754 09/25/20 0632   Weight: 70 kg (154 lb 5.2 oz) 71 kg (156 lb 8 oz)     Vital Signs with Ranges  Temp:  [97.7  F (36.5  C)] 97.7  F (36.5  C)  Pulse:  [77] 77  Resp:  [16] 16  BP: (101)/(69) 101/69  SpO2:  [97 %] 97 %  I/O last 3 completed shifts:  In: 480 [P.O.:480]  Out: -     GENERAL: NAD, cooperative, soft-speaking/hoarse voice  HEENT: Normocephalic. EOMI. No icterus or injection. Nares normal.   LUNGS: Clear to auscultation. No dyspnea at rest. large midline incision  HEART: Regular rate. Extremities perfused.   ABDOMEN: Soft, nontender, and nondistended. Positive bowel sounds.   EXTREMITIES: No LE edema noted.   NEUROLOGIC: moving all extremities without focal weakness    Medications       aspirin  81 mg Oral Daily     atorvastatin  80 mg Oral At Bedtime     benztropine  0.25 mg Oral BID     docusate sodium  100 mg Oral BID     haloperidol  7 mg Oral BID     levothyroxine  88 mcg Oral Daily     memantine  5 mg Oral Daily     nicotine  1 patch Transdermal Daily     nicotine   Transdermal TID     polyethylene glycol  17 g Oral Daily     salmeterol  1 puff Inhalation BID     venlafaxine  75 mg Oral Daily with breakfast       Data   No lab results found in last 7 days.    Imaging:   No results found for this or any previous  visit (from the past 24 hour(s)).

## 2020-11-01 NOTE — PLAN OF CARE
"DATE & TIME: 11/1/2020 0686-0665                      Cognitive Concerns/ Orientation : Alert to self only.    BEHAVIOR & AGGRESSION TOOL COLOR: yellow, set off door alarm once, asking for pop. 2nd time, he said \"its about time I get the fuck out of here\". Pt was not physically aggressive. Third time, asking for his clothes because his ride is coming. Writer told him to go back into room and call writer when his ride is here. Pt complied.  PRN Zyprexa 10 mg given.   CIWA SCORE: na      ABNL VS/O2: vss, on RA, daily VS.   MOBILITY: IND in room   PAIN MANAGMENT: denied pain.   DIET: regular. Good appetite.    BOWEL/BLADDER: continent.   ABNL LAB/BG: na   DRAIN/DEVICES: na   TELEMETRY RHYTHM: na   SKIN: intact.   TESTS/PROCEDURES: na   D/C DAY/GOALS/PLACE: pending placement.   OTHER IMPORTANT INFO: court hold, door alarm on.   MD/RN ROUNDING SIGNED OFF D/E SHIFT: yes   COMMIT TO SIT DONE AND SIGNED OFF yes      "

## 2020-11-01 NOTE — PLAN OF CARE
DATE & TIME: 10/31/2020 NIGHT SHIFT   Cognitive Concerns/ Orientation : Alert to self only, talks in hoarse whisper  BEHAVIOR & AGGRESSION TOOL COLOR: green  CIWA SCORE: na    ABNL VS/O2: daily VS, not done this shift  MOBILITY: IND in room, door alarm on  PAIN MANAGMENT: denied pain.   DIET: regular.   BOWEL/BLADDER: continent.   ABNL LAB/BG: na   DRAIN/DEVICES: na   TELEMETRY RHYTHM: na   SKIN: intact.   TESTS/PROCEDURES: na   D/C DAY/GOALS/PLACE: pending placement.   OTHER IMPORTANT INFO: court hold, door alarm on.   MD/RN ROUNDING SIGNED OFF D/E SHIFT: yes   COMMIT TO SIT DONE AND SIGNED OFF yes

## 2020-11-02 LAB
ALBUMIN SERPL-MCNC: 3.4 G/DL (ref 3.4–5)
ALP SERPL-CCNC: 102 U/L (ref 40–150)
ALT SERPL W P-5'-P-CCNC: 84 U/L (ref 0–70)
ANION GAP SERPL CALCULATED.3IONS-SCNC: 7 MMOL/L (ref 3–14)
AST SERPL W P-5'-P-CCNC: 89 U/L (ref 0–45)
BILIRUB SERPL-MCNC: 0.6 MG/DL (ref 0.2–1.3)
BUN SERPL-MCNC: 19 MG/DL (ref 7–30)
CALCIUM SERPL-MCNC: 9 MG/DL (ref 8.5–10.1)
CHLORIDE SERPL-SCNC: 111 MMOL/L (ref 94–109)
CO2 SERPL-SCNC: 24 MMOL/L (ref 20–32)
CREAT SERPL-MCNC: 0.71 MG/DL (ref 0.66–1.25)
GFR SERPL CREATININE-BSD FRML MDRD: >90 ML/MIN/{1.73_M2}
GLUCOSE SERPL-MCNC: 140 MG/DL (ref 70–99)
POTASSIUM SERPL-SCNC: 4.2 MMOL/L (ref 3.4–5.3)
PROT SERPL-MCNC: 7.7 G/DL (ref 6.8–8.8)
SODIUM SERPL-SCNC: 142 MMOL/L (ref 133–144)

## 2020-11-02 PROCEDURE — 250N000013 HC RX MED GY IP 250 OP 250 PS 637: Performed by: HOSPITALIST

## 2020-11-02 PROCEDURE — 250N000013 HC RX MED GY IP 250 OP 250 PS 637: Performed by: NURSE PRACTITIONER

## 2020-11-02 PROCEDURE — 250N000013 HC RX MED GY IP 250 OP 250 PS 637: Performed by: INTERNAL MEDICINE

## 2020-11-02 PROCEDURE — 36415 COLL VENOUS BLD VENIPUNCTURE: CPT | Performed by: STUDENT IN AN ORGANIZED HEALTH CARE EDUCATION/TRAINING PROGRAM

## 2020-11-02 PROCEDURE — 250N000013 HC RX MED GY IP 250 OP 250 PS 637: Performed by: PSYCHIATRY & NEUROLOGY

## 2020-11-02 PROCEDURE — 99231 SBSQ HOSP IP/OBS SF/LOW 25: CPT | Performed by: STUDENT IN AN ORGANIZED HEALTH CARE EDUCATION/TRAINING PROGRAM

## 2020-11-02 PROCEDURE — 120N000001 HC R&B MED SURG/OB

## 2020-11-02 PROCEDURE — 80053 COMPREHEN METABOLIC PANEL: CPT | Performed by: STUDENT IN AN ORGANIZED HEALTH CARE EDUCATION/TRAINING PROGRAM

## 2020-11-02 RX ADMIN — VENLAFAXINE HYDROCHLORIDE 75 MG: 75 CAPSULE, EXTENDED RELEASE ORAL at 09:33

## 2020-11-02 RX ADMIN — HALOPERIDOL 7 MG: 5 TABLET ORAL at 20:14

## 2020-11-02 RX ADMIN — POLYETHYLENE GLYCOL 3350 17 G: 17 POWDER, FOR SOLUTION ORAL at 09:35

## 2020-11-02 RX ADMIN — DOCUSATE SODIUM 100 MG: 100 CAPSULE, LIQUID FILLED ORAL at 09:33

## 2020-11-02 RX ADMIN — MEMANTINE 5 MG: 5 TABLET ORAL at 09:34

## 2020-11-02 RX ADMIN — ATORVASTATIN CALCIUM 80 MG: 40 TABLET, FILM COATED ORAL at 20:14

## 2020-11-02 RX ADMIN — SALMETEROL XINAFOATE 1 PUFF: 50 POWDER, METERED ORAL; RESPIRATORY (INHALATION) at 09:34

## 2020-11-02 RX ADMIN — OLANZAPINE 10 MG: 5 TABLET, ORALLY DISINTEGRATING ORAL at 19:18

## 2020-11-02 RX ADMIN — LEVOTHYROXINE SODIUM 88 MCG: 88 TABLET ORAL at 09:33

## 2020-11-02 RX ADMIN — ASPIRIN 81 MG: 81 TABLET, COATED ORAL at 09:33

## 2020-11-02 RX ADMIN — SALMETEROL XINAFOATE 1 PUFF: 50 POWDER, METERED ORAL; RESPIRATORY (INHALATION) at 20:15

## 2020-11-02 RX ADMIN — Medication 0.25 MG: at 20:14

## 2020-11-02 RX ADMIN — NICOTINE 1 PATCH: 21 PATCH, EXTENDED RELEASE TRANSDERMAL at 09:34

## 2020-11-02 RX ADMIN — HALOPERIDOL 7 MG: 5 TABLET ORAL at 09:33

## 2020-11-02 RX ADMIN — Medication 0.25 MG: at 09:34

## 2020-11-02 RX ADMIN — DOCUSATE SODIUM 100 MG: 100 CAPSULE, LIQUID FILLED ORAL at 20:14

## 2020-11-02 ASSESSMENT — ACTIVITIES OF DAILY LIVING (ADL)
ADLS_ACUITY_SCORE: 13

## 2020-11-02 NOTE — PLAN OF CARE
DATE & TIME: 11/2/2020 DAY                   Cognitive Concerns/ Orientation : Alert to self only. - flat affect.    BEHAVIOR & AGGRESSION TOOL COLOR: green- PRN zyprexa available.   CIWA SCORE: na      ABNL VS/O2: VSS on RA. Daily vitals.   MOBILITY: IND in room   PAIN MANAGMENT: denied pain.   DIET: regular. Good appetite.    BOWEL/BLADDER: cont.   ABNL LAB/BG: NA  DRAIN/DEVICES:NA  TELEMETRY RHYTHM: NA  SKIN: intact.   TESTS/PROCEDURES: NA  D/C DAY/GOALS/PLACE: pending placement.   OTHER IMPORTANT INFO: ID/PT/OT following. Pt on court hold, door alarm on. Nicotine patch on R shoulder.  MD/RN ROUNDING SIGNED OFF D/E SHIFT: yes   COMMIT TO SIT DONE AND SIGNED OFF yes

## 2020-11-02 NOTE — PLAN OF CARE
DATE & TIME: 11/1 from 1900 to 0730    Cognitive Concerns/ Orientation : A&O x 2, disoriented to situation and time. forgetful  BEHAVIOR & AGGRESSION TOOL COLOR: Green most of the shift.   CIWA SCORE: N/A   ABNL VS/O2: daily vitials  MOBILITY: Up an independent  PAIN MANAGMENT: Denied  DIET: Regular  BOWEL/BLADDER: Continent to B/B  ABNL LAB/BG: [facility]  DRAIN/DEVICES: No IV access  TELEMETRY RHYTHM: N/A  SKIN: Bruised, otherwise intact  TESTS/PROCEDURES: N/A  D/C DAY/GOALS/PLACE: Discharge pending for placement  OTHER IMPORTANT INFO: On court hold, door alarm in placed.   MD/RN ROUNDING SIGNED OFF D/E SHIFT: N/A  COMMIT TO SIT DONE AND SIGNED OFF Yes

## 2020-11-02 NOTE — PROGRESS NOTES
[facility]    HOSPITALIST PROGRESS NOTE :   --------------------------------------------------    Date of Admission:  9/9/2020    Cumulative Summary: John Juárez is a 54 year old male with PMHx of schizophrenia, hx of TBI, alcohol use disorder, COPD, hyperlipidemia and hypothyroidism who was admitted on 9/9/2020 for evaluation of aggressive behavior at his group home. His medical condition remains stable, though his group home is now unwilling to take him back. Hospitalization has been prolonged while arranging for new placement.     Assessment & Plan     Neurocognitive disorder dt hx of TBI and alcohol use disorder  Schizophrenia  Had been residing in group home prior to admission.  Brought to hospital for evaluation of aggressive behaviors. Group home now unwilling to take him back. Has had numerous CODE 21s called this stay. Seen by psych, meds adjusted. Is currently under civil commitment and court hold through [facility] until 7/31/21.  Patient has been undergoing paper work for funding and placement.    -- Patient has been last evaluated by psych on September 29.Patient has been doing well clinically  -- Currently patient is on Cogentin 0.25 mg twice daily, Haldol 7 mg twice daily, Namenda 5 mg daily and Effexor 75 mg p.o. daily, continue current meds   -- PRN zyprexa and haldol available for agitation has not needed any PRNs since 10/22  -- Patient did have an interview on Thursday 10/22 with [facility]; paper work filled and emailed; SW following     Hyperlipidemia  -- Chronic and stable on aspirin and statin     COPD; Not O2 dependent.  -- Stable on salmeterol     Hypothyroidism  --Chronic and stable on levothyroxine     Tobacco Use  --Using nicotine patch    Diet: Room Service  Combination Diet Regular Diet Adult; Safe Tray - NO utensils    Valentin Catheter: not present  DVT Prophylaxis: Pneumatic Compression Devices  Code Status: Full Code    The patient's care was  discussed with the patient and his nurse    Disposition Plan   Expected discharge: discharge date is unclear , pending safe disposition plan , SW are following     Linda Oleary MD    ----------------------------------------------------------------------------------------------------------------------    Interval History    Sleeping but wakes up. No complaints. No outbursts or aggression. Denies pain. Breathing stable.     -Data reviewed today: I reviewed all new labs and imaging results over the last 24 hours.    I personally reviewed no images or EKG's today.    Physical Exam   Temp: 98.2  F (36.8  C) Temp src: Oral BP: 97/67 Pulse: 83   Resp: 16 SpO2: 98 % O2 Device: None (Room air)    Vitals:    09/09/20 1754 09/25/20 0632   Weight: 70 kg (154 lb 5.2 oz) 71 kg (156 lb 8 oz)     Vital Signs with Ranges  Temp:  [98.2  F (36.8  C)] 98.2  F (36.8  C)  Pulse:  [83] 83  Resp:  [16] 16  BP: (97)/(67) 97/67  SpO2:  [98 %] 98 %  No intake/output data recorded.    GENERAL: NAD, cooperative, soft-speaking/hoarse voice  HEENT: Normocephalic. EOMI. No icterus or injection. Nares normal.   LUNGS: Clear to auscultation. No dyspnea at rest. large midline incision  HEART: Regular rate. Extremities perfused.   ABDOMEN: Soft, nontender, and nondistended. Positive bowel sounds.   EXTREMITIES: No LE edema noted.   NEUROLOGIC: moving all extremities without focal weakness    Medications       aspirin  81 mg Oral Daily     atorvastatin  80 mg Oral At Bedtime     benztropine  0.25 mg Oral BID     docusate sodium  100 mg Oral BID     haloperidol  7 mg Oral BID     levothyroxine  88 mcg Oral Daily     memantine  5 mg Oral Daily     nicotine  1 patch Transdermal Daily     nicotine   Transdermal TID     polyethylene glycol  17 g Oral Daily     salmeterol  1 puff Inhalation BID     venlafaxine  75 mg Oral Daily with breakfast       Data   Recent Labs   Lab 11/02/20  1019      POTASSIUM 4.2   CHLORIDE 111*   CO2 24   BUN 19   CR  0.71   ANIONGAP 7   LESLEE 9.0   *   ALBUMIN 3.4   PROTTOTAL 7.7   BILITOTAL 0.6   ALKPHOS 102   ALT 84*   AST 89*       Imaging:   No results found for this or any previous visit (from the past 24 hour(s)).

## 2020-11-03 PROCEDURE — 120N000001 HC R&B MED SURG/OB

## 2020-11-03 PROCEDURE — 250N000013 HC RX MED GY IP 250 OP 250 PS 637: Performed by: INTERNAL MEDICINE

## 2020-11-03 PROCEDURE — 99231 SBSQ HOSP IP/OBS SF/LOW 25: CPT | Performed by: INTERNAL MEDICINE

## 2020-11-03 PROCEDURE — 250N000013 HC RX MED GY IP 250 OP 250 PS 637: Performed by: HOSPITALIST

## 2020-11-03 PROCEDURE — 250N000013 HC RX MED GY IP 250 OP 250 PS 637: Performed by: PSYCHIATRY & NEUROLOGY

## 2020-11-03 PROCEDURE — 99207 PR CDG-CODE CATEGORY CHANGED: CPT | Performed by: INTERNAL MEDICINE

## 2020-11-03 PROCEDURE — 250N000013 HC RX MED GY IP 250 OP 250 PS 637: Performed by: NURSE PRACTITIONER

## 2020-11-03 RX ADMIN — NICOTINE 1 PATCH: 21 PATCH, EXTENDED RELEASE TRANSDERMAL at 08:02

## 2020-11-03 RX ADMIN — ATORVASTATIN CALCIUM 80 MG: 40 TABLET, FILM COATED ORAL at 20:08

## 2020-11-03 RX ADMIN — HALOPERIDOL 7 MG: 5 TABLET ORAL at 08:02

## 2020-11-03 RX ADMIN — SALMETEROL XINAFOATE 1 PUFF: 50 POWDER, METERED ORAL; RESPIRATORY (INHALATION) at 20:07

## 2020-11-03 RX ADMIN — ASPIRIN 81 MG: 81 TABLET, COATED ORAL at 08:02

## 2020-11-03 RX ADMIN — LEVOTHYROXINE SODIUM 88 MCG: 88 TABLET ORAL at 08:02

## 2020-11-03 RX ADMIN — MEMANTINE 5 MG: 5 TABLET ORAL at 08:03

## 2020-11-03 RX ADMIN — VENLAFAXINE HYDROCHLORIDE 75 MG: 75 CAPSULE, EXTENDED RELEASE ORAL at 08:02

## 2020-11-03 RX ADMIN — SALMETEROL XINAFOATE 1 PUFF: 50 POWDER, METERED ORAL; RESPIRATORY (INHALATION) at 08:13

## 2020-11-03 RX ADMIN — POLYETHYLENE GLYCOL 3350 17 G: 17 POWDER, FOR SOLUTION ORAL at 08:01

## 2020-11-03 RX ADMIN — Medication 0.25 MG: at 20:08

## 2020-11-03 RX ADMIN — NICOTINE POLACRILEX 2 MG: 2 GUM, CHEWING ORAL at 15:27

## 2020-11-03 RX ADMIN — HALOPERIDOL 7 MG: 5 TABLET ORAL at 20:07

## 2020-11-03 RX ADMIN — Medication 0.25 MG: at 08:03

## 2020-11-03 RX ADMIN — DOCUSATE SODIUM 100 MG: 100 CAPSULE, LIQUID FILLED ORAL at 08:02

## 2020-11-03 RX ADMIN — DOCUSATE SODIUM 100 MG: 100 CAPSULE, LIQUID FILLED ORAL at 20:08

## 2020-11-03 ASSESSMENT — ACTIVITIES OF DAILY LIVING (ADL)
ADLS_ACUITY_SCORE: 13

## 2020-11-03 NOTE — PROGRESS NOTES
[facility]    HOSPITALIST PROGRESS NOTE :   --------------------------------------------------    Date of Admission:  9/9/2020    Cumulative Summary: John Juárez is a 54 year old male with PMHx of schizophrenia, hx of TBI, alcohol use disorder, COPD, hyperlipidemia and hypothyroidism who was admitted on 9/9/2020 for evaluation of aggressive behavior at his group home. His medical condition remains stable, though his group home is now unwilling to take him back. Hospitalization has been prolonged while arranging for new placement.     Assessment & Plan     Neurocognitive disorder dt hx of TBI and alcohol use disorder  Schizophrenia  Had been residing in group home prior to admission.  Brought to hospital for evaluation of aggressive behaviors. Group home now unwilling to take him back. Has had numerous CODE 21s called this stay. Seen by psych, meds adjusted. Is currently under civil commitment and court hold through [facility] until 7/31/21.  Patient has been undergoing paper work for funding and placement.     -- Patient has been last evaluated by psych on September 29.Patient has been doing well clinically  -- Currently patient is on Cogentin 0.25 mg twice daily, Haldol 7 mg twice daily, Namenda 5 mg daily and Effexor 75 mg p.o. daily, continue current meds   -- PRN zyprexa and haldol available for agitation has not needed any PRNs since 10/22  -- Patient did have an interview on Thursday 10/22 with [facility]; paper work filled and emailed; SW following     Hyperlipidemia  -- Chronic and stable on aspirin and statin     COPD; Not O2 dependent.  -- Stable on salmeterol     Hypothyroidism  --Chronic and stable on levothyroxine     Tobacco Use  --Using nicotine patch    Diet: Room Service  Combination Diet Regular Diet Adult; Safe Tray - NO utensils    Valentin Catheter: not present  DVT Prophylaxis: Pneumatic Compression Devices and Ambulate every shift  Code Status: Full  Code    The patient's care was discussed with the Bedside Nurse and Patient.    Disposition Plan   Expected discharge: Discharge date is unclear , pending safe disposition plan , SW are following     Carmen Douglass MD, [facility]  Text Page (7am - 6pm)    ----------------------------------------------------------------------------------------------------------------------    Interval History   Patient care was assumed this morning , denying any complaints , no outbursts or aggressions , no chest pain or palpitations     -Data reviewed today: I reviewed all new labs and imaging results over the last 24 hours.    I personally reviewed no images or EKG's today.    Physical Exam   Temp: 97.6  F (36.4  C) Temp src: Oral BP: 99/69 Pulse: 70   Resp: 18 SpO2: 95 %      Vitals:    09/09/20 1754 09/25/20 0632   Weight: 70 kg (154 lb 5.2 oz) 71 kg (156 lb 8 oz)     Vital Signs with Ranges  Temp:  [97.6  F (36.4  C)] 97.6  F (36.4  C)  Pulse:  [70] 70  Resp:  [18] 18  BP: (99)/(69) 99/69  SpO2:  [95 %] 95 %  I/O last 3 completed shifts:  In: 420 [P.O.:420]  Out: -     GENERAL: Alert , awake and oriented. NAD. Conversational, appropriate.soft speaking   HEENT: Normocephalic. EOMI. No icterus or injection. Nares normal.   LUNGS: Clear to auscultation. No dyspnea at rest.   HEART: Regular rate. Extremities perfused.   ABDOMEN: Soft, nontender, and nondistended. Positive bowel sounds.   EXTREMITIES: No LE edema noted.   NEUROLOGIC: Moves extremities x4 on command. No acute focal neurologic abnormalities noted.     Medications       aspirin  81 mg Oral Daily     atorvastatin  80 mg Oral At Bedtime     benztropine  0.25 mg Oral BID     docusate sodium  100 mg Oral BID     haloperidol  7 mg Oral BID     levothyroxine  88 mcg Oral Daily     memantine  5 mg Oral Daily     nicotine  1 patch Transdermal Daily     nicotine   Transdermal TID     polyethylene glycol  17 g Oral Daily     salmeterol  1 puff Inhalation BID     venlafaxine  75 mg Oral  Daily with breakfast       Data   Recent Labs   Lab 11/02/20  1019      POTASSIUM 4.2   CHLORIDE 111*   CO2 24   BUN 19   CR 0.71   ANIONGAP 7   LESLEE 9.0   *   ALBUMIN 3.4   PROTTOTAL 7.7   BILITOTAL 0.6   ALKPHOS 102   ALT 84*   AST 89*       Imaging:   No results found for this or any previous visit (from the past 24 hour(s)).

## 2020-11-03 NOTE — PLAN OF CARE
DATE & TIME: 11/3/2020 5604-7323                      Cognitive Concerns/ Orientation : Alert to self only.    BEHAVIOR & AGGRESSION TOOL COLOR: green  CIWA SCORE: na      ABNL VS/O2: vss, on RA, daily VS.   MOBILITY: IND in room   PAIN MANAGMENT: denied pain.   DIET: regular. Good appetite.    BOWEL/BLADDER: continent.   ABNL LAB/BG: na   DRAIN/DEVICES: na   TELEMETRY RHYTHM: na   SKIN: intact.   TESTS/PROCEDURES: na   D/C DAY/GOALS/PLACE: pending placement.   OTHER IMPORTANT INFO: court hold, door alarm on.   MD/RN ROUNDING SIGNED OFF D/E SHIFT: yes   COMMIT TO SIT DONE AND SIGNED OFF yes

## 2020-11-03 NOTE — PLAN OF CARE
DATE & TIME: 11/2/2020 3250-9414                   Cognitive Concerns/ Orientation : Alert to self only. - flat affect.    BEHAVIOR & AGGRESSION TOOL COLOR: green- PRN zyprexa available.   CIWA SCORE: NA     ABNL VS/O2: VSS on RA. Daily vitals.   MOBILITY: IND in room   PAIN MANAGMENT: Denied pain.   DIET: regular. Good appetite.    BOWEL/BLADDER: cont.   ABNL LAB/BG: NA  DRAIN/DEVICES:NA  TELEMETRY RHYTHM: NA  SKIN: intact.   TESTS/PROCEDURES: NA  D/C DAY/GOALS/PLACE: pending placement.   OTHER IMPORTANT INFO: ID/PT/OT following. Pt on court hold, door alarm on. Nicotine patch on R shoulder.  MD/RN ROUNDING SIGNED OFF D/E SHIFT:    COMMIT TO SIT DONE AND SIGNED OFF yes

## 2020-11-04 PROCEDURE — 250N000013 HC RX MED GY IP 250 OP 250 PS 637: Performed by: PSYCHIATRY & NEUROLOGY

## 2020-11-04 PROCEDURE — 250N000013 HC RX MED GY IP 250 OP 250 PS 637: Performed by: NURSE PRACTITIONER

## 2020-11-04 PROCEDURE — 250N000013 HC RX MED GY IP 250 OP 250 PS 637: Performed by: HOSPITALIST

## 2020-11-04 PROCEDURE — 120N000001 HC R&B MED SURG/OB

## 2020-11-04 PROCEDURE — 250N000013 HC RX MED GY IP 250 OP 250 PS 637: Performed by: INTERNAL MEDICINE

## 2020-11-04 PROCEDURE — 99231 SBSQ HOSP IP/OBS SF/LOW 25: CPT | Performed by: INTERNAL MEDICINE

## 2020-11-04 RX ADMIN — NICOTINE POLACRILEX 2 MG: 2 GUM, CHEWING ORAL at 08:57

## 2020-11-04 RX ADMIN — DOCUSATE SODIUM 100 MG: 100 CAPSULE, LIQUID FILLED ORAL at 21:22

## 2020-11-04 RX ADMIN — ASPIRIN 81 MG: 81 TABLET, COATED ORAL at 08:52

## 2020-11-04 RX ADMIN — ATORVASTATIN CALCIUM 80 MG: 40 TABLET, FILM COATED ORAL at 21:22

## 2020-11-04 RX ADMIN — DOCUSATE SODIUM 100 MG: 100 CAPSULE, LIQUID FILLED ORAL at 08:51

## 2020-11-04 RX ADMIN — HALOPERIDOL 7 MG: 5 TABLET ORAL at 21:22

## 2020-11-04 RX ADMIN — Medication 0.25 MG: at 21:22

## 2020-11-04 RX ADMIN — Medication 0.25 MG: at 08:52

## 2020-11-04 RX ADMIN — POLYETHYLENE GLYCOL 3350 17 G: 17 POWDER, FOR SOLUTION ORAL at 08:50

## 2020-11-04 RX ADMIN — SALMETEROL XINAFOATE 1 PUFF: 50 POWDER, METERED ORAL; RESPIRATORY (INHALATION) at 08:55

## 2020-11-04 RX ADMIN — VENLAFAXINE HYDROCHLORIDE 75 MG: 75 CAPSULE, EXTENDED RELEASE ORAL at 08:51

## 2020-11-04 RX ADMIN — OLANZAPINE 10 MG: 5 TABLET, ORALLY DISINTEGRATING ORAL at 14:53

## 2020-11-04 RX ADMIN — SALMETEROL XINAFOATE 1 PUFF: 50 POWDER, METERED ORAL; RESPIRATORY (INHALATION) at 21:24

## 2020-11-04 RX ADMIN — LEVOTHYROXINE SODIUM 88 MCG: 88 TABLET ORAL at 08:52

## 2020-11-04 RX ADMIN — NICOTINE 1 PATCH: 21 PATCH, EXTENDED RELEASE TRANSDERMAL at 08:50

## 2020-11-04 RX ADMIN — MEMANTINE 5 MG: 5 TABLET ORAL at 08:51

## 2020-11-04 RX ADMIN — HALOPERIDOL 7 MG: 5 TABLET ORAL at 08:52

## 2020-11-04 ASSESSMENT — ACTIVITIES OF DAILY LIVING (ADL)
ADLS_ACUITY_SCORE: 13

## 2020-11-04 NOTE — PLAN OF CARE
DATE & TIME: 11/4/2020 0700-1930                      Cognitive Concerns/ Orientation : Alert to self only.    BEHAVIOR & AGGRESSION TOOL COLOR: green, can be yellow at times--set off door alarm several times, asking to see his sister whom he thought was at the . PRN Zyprexa once.   CIWA SCORE: na      ABNL VS/O2: vss, on RA, daily VS.   MOBILITY: IND in room   PAIN MANAGMENT: denied pain.   DIET: regular. Good appetite.    BOWEL/BLADDER: continent.   ABNL LAB/BG: na   DRAIN/DEVICES: na   TELEMETRY RHYTHM: na   SKIN: intact., showered.   TESTS/PROCEDURES: na   D/C DAY/GOALS/PLACE: pending placement.   OTHER IMPORTANT INFO: court hold, door alarm on.   MD/RN ROUNDING SIGNED OFF D/E SHIFT: yes   COMMIT TO SIT DONE AND SIGNED OFF yes

## 2020-11-04 NOTE — PROGRESS NOTES
[facility]    HOSPITALIST PROGRESS NOTE :   --------------------------------------------------    Date of Admission:  9/9/2020    Cumulative Summary: John Juárez is a 54 year old male with PMHx of schizophrenia, hx of TBI, alcohol use disorder, COPD, hyperlipidemia and hypothyroidism who was admitted on 9/9/2020 for evaluation of aggressive behavior at his group home. His medical condition remains stable, though his group home is now unwilling to take him back. Hospitalization has been prolonged while arranging for new placement.     Assessment & Plan     Neurocognitive disorder dt hx of TBI and alcohol use disorder  Schizophrenia  Had been residing in group home prior to admission.  Brought to hospital for evaluation of aggressive behaviors. Group home now unwilling to take him back. Has had numerous CODE 21s called this stay. Seen by psych, meds adjusted. Is currently under civil commitment and court hold through [facility] until 7/31/21.  Patient has been undergoing paper work for funding and placement.     -- Patient has been last evaluated by psych on September 29th , no recent change in med's   --.Patient has been doing well clinically  -- Currently patient is on Cogentin 0.25 mg twice daily, Haldol 7 mg twice daily, Namenda 5 mg daily and Effexor 75 mg p.o. daily, continue current meds   -- PRN zyprexa and haldol available for agitation has not needed any PRNs since 10/22  -- Patient did have an interview on Thursday 10/22 with [facility]; paper work filled and emailed; SW following     Hyperlipidemia  -- Chronic and stable on aspirin and statin     COPD; Not O2 dependent.  -- Stable on salmeterol     Hypothyroidism  --Chronic and stable on levothyroxine     Tobacco Use  --Using nicotine patch    Diet: Room Service  Combination Diet Regular Diet Adult; Safe Tray - NO utensils    Valentin Catheter: not present  DVT Prophylaxis: Pneumatic Compression Devices and Ambulate  every shift  Code Status: Full Code    The patient's care was discussed with the Bedside Nurse and Patient.    Disposition Plan   Expected discharge: Discharge date is unclear , pending safe disposition plan , SW are following     Carmen Douglass MD, FACP  Text Page (7am - 6pm)    ----------------------------------------------------------------------------------------------------------------------    Interval History    Patient was seen and examined, lying in bed, wants to know if we have any time idea for discharge , discussed with him that  are following and I will update him a soon as I have some info , he is otherwise denying any complaints , encouraged him to increase his mobilization     -Data reviewed today: I reviewed all new labs and imaging results over the last 24 hours.    I personally reviewed no images or EKG's today.    Physical Exam   Temp: 98.1  F (36.7  C) Temp src: Oral BP: 95/67 Pulse: 68   Resp: 18 SpO2: 99 % O2 Device: None (Room air)    Vitals:    09/09/20 1754 09/25/20 0632   Weight: 70 kg (154 lb 5.2 oz) 71 kg (156 lb 8 oz)     Vital Signs with Ranges  Temp:  [98.1  F (36.7  C)] 98.1  F (36.7  C)  Pulse:  [68] 68  Resp:  [18] 18  BP: (95)/(67) 95/67  SpO2:  [99 %] 99 %  I/O last 3 completed shifts:  In: 1320 [P.O.:1320]  Out: -     GENERAL: Alert , awake and oriented. NAD. Conversational, appropriate.soft speaking   HEENT: Normocephalic. EOMI. No icterus or injection. Nares normal.   LUNGS: Clear to auscultation. No dyspnea at rest.   HEART: Regular rate. Extremities perfused.   ABDOMEN: Soft, nontender, and nondistended. Positive bowel sounds.   EXTREMITIES: No LE edema noted.   NEUROLOGIC: Moves extremities x4 on command. No acute focal neurologic abnormalities noted.     Medications       aspirin  81 mg Oral Daily     atorvastatin  80 mg Oral At Bedtime     benztropine  0.25 mg Oral BID     docusate sodium  100 mg Oral BID     haloperidol  7 mg Oral BID     levothyroxine  88  mcg Oral Daily     memantine  5 mg Oral Daily     nicotine  1 patch Transdermal Daily     nicotine   Transdermal TID     polyethylene glycol  17 g Oral Daily     salmeterol  1 puff Inhalation BID     venlafaxine  75 mg Oral Daily with breakfast       Data   Recent Labs   Lab 11/02/20  1019      POTASSIUM 4.2   CHLORIDE 111*   CO2 24   BUN 19   CR 0.71   ANIONGAP 7   LESLEE 9.0   *   ALBUMIN 3.4   PROTTOTAL 7.7   BILITOTAL 0.6   ALKPHOS 102   ALT 84*   AST 89*       Imaging:   No results found for this or any previous visit (from the past 24 hour(s)).

## 2020-11-04 NOTE — PLAN OF CARE
Minimal sleep overnight. Stayed in bed and watched TV most of the night. Has been calm and cooperative. Orientated to self. Ambulating independently. VSS, room air. Regular diet. Group home won't take back, needs new placement. SW following. Door alarm in place, patient is on a court hold.

## 2020-11-05 PROCEDURE — 250N000013 HC RX MED GY IP 250 OP 250 PS 637: Performed by: INTERNAL MEDICINE

## 2020-11-05 PROCEDURE — 120N000001 HC R&B MED SURG/OB

## 2020-11-05 PROCEDURE — 250N000013 HC RX MED GY IP 250 OP 250 PS 637: Performed by: HOSPITALIST

## 2020-11-05 PROCEDURE — 250N000013 HC RX MED GY IP 250 OP 250 PS 637: Performed by: NURSE PRACTITIONER

## 2020-11-05 PROCEDURE — 250N000013 HC RX MED GY IP 250 OP 250 PS 637: Performed by: PSYCHIATRY & NEUROLOGY

## 2020-11-05 PROCEDURE — 99231 SBSQ HOSP IP/OBS SF/LOW 25: CPT | Performed by: INTERNAL MEDICINE

## 2020-11-05 RX ADMIN — ATORVASTATIN CALCIUM 80 MG: 40 TABLET, FILM COATED ORAL at 20:32

## 2020-11-05 RX ADMIN — LEVOTHYROXINE SODIUM 88 MCG: 88 TABLET ORAL at 07:46

## 2020-11-05 RX ADMIN — NICOTINE 1 PATCH: 21 PATCH, EXTENDED RELEASE TRANSDERMAL at 07:47

## 2020-11-05 RX ADMIN — VENLAFAXINE HYDROCHLORIDE 75 MG: 75 CAPSULE, EXTENDED RELEASE ORAL at 07:46

## 2020-11-05 RX ADMIN — MEMANTINE 5 MG: 5 TABLET ORAL at 07:46

## 2020-11-05 RX ADMIN — HALOPERIDOL 7 MG: 5 TABLET ORAL at 07:46

## 2020-11-05 RX ADMIN — DOCUSATE SODIUM 100 MG: 100 CAPSULE, LIQUID FILLED ORAL at 07:46

## 2020-11-05 RX ADMIN — HALOPERIDOL 7 MG: 5 TABLET ORAL at 20:33

## 2020-11-05 RX ADMIN — SALMETEROL XINAFOATE 1 PUFF: 50 POWDER, METERED ORAL; RESPIRATORY (INHALATION) at 07:51

## 2020-11-05 RX ADMIN — Medication 0.25 MG: at 07:46

## 2020-11-05 RX ADMIN — ASPIRIN 81 MG: 81 TABLET, COATED ORAL at 07:46

## 2020-11-05 RX ADMIN — SALMETEROL XINAFOATE 1 PUFF: 50 POWDER, METERED ORAL; RESPIRATORY (INHALATION) at 20:32

## 2020-11-05 RX ADMIN — Medication 0.25 MG: at 20:33

## 2020-11-05 RX ADMIN — POLYETHYLENE GLYCOL 3350 17 G: 17 POWDER, FOR SOLUTION ORAL at 07:45

## 2020-11-05 RX ADMIN — DOCUSATE SODIUM 100 MG: 100 CAPSULE, LIQUID FILLED ORAL at 20:32

## 2020-11-05 ASSESSMENT — ACTIVITIES OF DAILY LIVING (ADL)
ADLS_ACUITY_SCORE: 13

## 2020-11-05 NOTE — PROGRESS NOTES
[facility]    HOSPITALIST PROGRESS NOTE :   --------------------------------------------------    Date of Admission:  9/9/2020    Cumulative Summary: John Juárez is a 54 year old male with PMHx of schizophrenia, hx of TBI, alcohol use disorder, COPD, hyperlipidemia and hypothyroidism who was admitted on 9/9/2020 for evaluation of aggressive behavior at his group home. His medical condition remains stable, though his group home is now unwilling to take him back. Hospitalization has been prolonged while arranging for new placement.     Assessment & Plan     Neurocognitive disorder dt hx of TBI and alcohol use disorder  Schizophrenia  Had been residing in group home prior to admission.  Brought to hospital for evaluation of aggressive behaviors. Group home now unwilling to take him back. Has had numerous CODE 21s called this stay. Seen by psych, meds adjusted. Is currently under civil commitment and court hold through [facility] until 7/31/21.  Patient has been undergoing paper work for funding and placement.     -- Patient has been last evaluated by psych on September 29th , no recent change in med's   --.Patient has been doing well clinically  -- Currently patient is on Cogentin 0.25 mg twice daily, Haldol 7 mg twice daily, Namenda 5 mg daily and Effexor 75 mg p.o. daily, continue current meds   -- PRN zyprexa and haldol available for agitation has not needed any PRNs since 10/22  -- Patient did have an interview on Thursday 10/22 with [facility]; paper work filled and emailed; SW following  -- Discussed with social service , patient is awaiting funding which can take few weeks      Hyperlipidemia  -- Chronic and stable on aspirin and statin     COPD; Not O2 dependent.  -- Stable on salmeterol     Hypothyroidism  --Chronic and stable on levothyroxine     Tobacco Use  --Using nicotine patch    Diet: Room Service  Combination Diet Regular Diet Adult; Safe Tray - NO utensils     Valentin Catheter: not present  DVT Prophylaxis: Pneumatic Compression Devices and Ambulate every shift  Code Status: Full Code    The patient's care was discussed with the Bedside Nurse and Patient.    Disposition Plan   Expected discharge: Discharge date is unclear , pending safe disposition plan , SW are following , awaiting funding     Carmen Douglass MD, [facility]  Text Page (7am - 6pm)    ----------------------------------------------------------------------------------------------------------------------    Interval History    Patient was seen and examined , was sleeping initially , denying any complaints       -Data reviewed today: I reviewed all new labs and imaging results over the last 24 hours.    I personally reviewed no images or EKG's today.    Physical Exam   Temp: 97.7  F (36.5  C) Temp src: Oral BP: 107/72 Pulse: 74   Resp: 16 SpO2: 99 % O2 Device: None (Room air)    Vitals:    09/09/20 1754 09/25/20 0632   Weight: 70 kg (154 lb 5.2 oz) 71 kg (156 lb 8 oz)     Vital Signs with Ranges  Temp:  [97.7  F (36.5  C)] 97.7  F (36.5  C)  Pulse:  [74] 74  Resp:  [16] 16  BP: (107)/(72) 107/72  SpO2:  [99 %] 99 %  I/O last 3 completed shifts:  In: 1080 [P.O.:1080]  Out: -     GENERAL: Alert , awake and oriented. NAD. Conversational, appropriate.soft speaking   HEENT: Normocephalic. EOMI. No icterus or injection. Nares normal.   LUNGS: Clear to auscultation. No dyspnea at rest.   HEART: Regular rate. Extremities perfused.   ABDOMEN: Soft, nontender, and nondistended. Positive bowel sounds.   EXTREMITIES: No LE edema noted.   NEUROLOGIC: Moves extremities x4 on command. No acute focal neurologic abnormalities noted.     Medications       aspirin  81 mg Oral Daily     atorvastatin  80 mg Oral At Bedtime     benztropine  0.25 mg Oral BID     docusate sodium  100 mg Oral BID     haloperidol  7 mg Oral BID     levothyroxine  88 mcg Oral Daily     memantine  5 mg Oral Daily     nicotine  1 patch Transdermal Daily      nicotine   Transdermal TID     polyethylene glycol  17 g Oral Daily     salmeterol  1 puff Inhalation BID     venlafaxine  75 mg Oral Daily with breakfast       Data   Recent Labs   Lab 11/02/20  1019      POTASSIUM 4.2   CHLORIDE 111*   CO2 24   BUN 19   CR 0.71   ANIONGAP 7   LESLEE 9.0   *   ALBUMIN 3.4   PROTTOTAL 7.7   BILITOTAL 0.6   ALKPHOS 102   ALT 84*   AST 89*       Imaging:   No results found for this or any previous visit (from the past 24 hour(s)).

## 2020-11-05 NOTE — PLAN OF CARE
DATE & TIME: 11/5/2020 6758-7364                      Cognitive Concerns/ Orientation : Alert to self only.    BEHAVIOR & AGGRESSION TOOL COLOR: green  CIWA SCORE: na      ABNL VS/O2: vss, on RA, daily VS.   MOBILITY: IND in room   PAIN MANAGMENT: denied pain.   DIET: regular. Good appetite.    BOWEL/BLADDER: continent.   ABNL LAB/BG: na   DRAIN/DEVICES: na   TELEMETRY RHYTHM: na   SKIN: intact.   TESTS/PROCEDURES: na   D/C DAY/GOALS/PLACE: pending placement, following.   OTHER IMPORTANT INFO: court hold, door alarm on.   MD/RN ROUNDING SIGNED OFF D/E SHIFT: yes   COMMIT TO SIT DONE AND SIGNED OFF yes

## 2020-11-06 PROCEDURE — 99231 SBSQ HOSP IP/OBS SF/LOW 25: CPT | Performed by: INTERNAL MEDICINE

## 2020-11-06 PROCEDURE — 250N000013 HC RX MED GY IP 250 OP 250 PS 637: Performed by: PSYCHIATRY & NEUROLOGY

## 2020-11-06 PROCEDURE — 250N000013 HC RX MED GY IP 250 OP 250 PS 637: Performed by: NURSE PRACTITIONER

## 2020-11-06 PROCEDURE — 250N000013 HC RX MED GY IP 250 OP 250 PS 637: Performed by: INTERNAL MEDICINE

## 2020-11-06 PROCEDURE — 250N000013 HC RX MED GY IP 250 OP 250 PS 637: Performed by: HOSPITALIST

## 2020-11-06 PROCEDURE — 120N000001 HC R&B MED SURG/OB

## 2020-11-06 RX ADMIN — ASPIRIN 81 MG: 81 TABLET, COATED ORAL at 10:16

## 2020-11-06 RX ADMIN — NICOTINE 1 PATCH: 21 PATCH, EXTENDED RELEASE TRANSDERMAL at 10:15

## 2020-11-06 RX ADMIN — HALOPERIDOL 7 MG: 5 TABLET ORAL at 10:17

## 2020-11-06 RX ADMIN — DOCUSATE SODIUM 100 MG: 100 CAPSULE, LIQUID FILLED ORAL at 10:17

## 2020-11-06 RX ADMIN — Medication 0.25 MG: at 10:16

## 2020-11-06 RX ADMIN — DOCUSATE SODIUM 100 MG: 100 CAPSULE, LIQUID FILLED ORAL at 21:37

## 2020-11-06 RX ADMIN — VENLAFAXINE HYDROCHLORIDE 75 MG: 75 CAPSULE, EXTENDED RELEASE ORAL at 10:17

## 2020-11-06 RX ADMIN — HALOPERIDOL 7 MG: 5 TABLET ORAL at 21:37

## 2020-11-06 RX ADMIN — POLYETHYLENE GLYCOL 3350 17 G: 17 POWDER, FOR SOLUTION ORAL at 10:16

## 2020-11-06 RX ADMIN — ATORVASTATIN CALCIUM 80 MG: 40 TABLET, FILM COATED ORAL at 21:37

## 2020-11-06 RX ADMIN — OLANZAPINE 10 MG: 5 TABLET, ORALLY DISINTEGRATING ORAL at 12:27

## 2020-11-06 RX ADMIN — LEVOTHYROXINE SODIUM 88 MCG: 88 TABLET ORAL at 10:17

## 2020-11-06 RX ADMIN — SALMETEROL XINAFOATE 1 PUFF: 50 POWDER, METERED ORAL; RESPIRATORY (INHALATION) at 10:22

## 2020-11-06 RX ADMIN — OLANZAPINE 10 MG: 5 TABLET, ORALLY DISINTEGRATING ORAL at 21:37

## 2020-11-06 RX ADMIN — Medication 0.25 MG: at 21:37

## 2020-11-06 RX ADMIN — MEMANTINE 5 MG: 5 TABLET ORAL at 10:16

## 2020-11-06 ASSESSMENT — ACTIVITIES OF DAILY LIVING (ADL)
ADLS_ACUITY_SCORE: 13

## 2020-11-06 NOTE — PLAN OF CARE
DATE & TIME: 11/5/2020 9573-7025            Cognitive Concerns/ Orientation : Alert to self and sometimes place.   BEHAVIOR & AGGRESSION TOOL COLOR: Green  CIWA SCORE: NA  ABNL VS/O2: VSS on RA, daily VS.   MOBILITY: IND in room   PAIN MANAGMENT: Denied pain.   DIET: regular. Good appetite.    BOWEL/BLADDER: continent. BM today.   ABNL LAB/BG: na   DRAIN/DEVICES: na   TELEMETRY RHYTHM: na   SKIN: intact.   TESTS/PROCEDURES: na   D/C DAY/GOALS/PLACE: pending placement, following.   OTHER IMPORTANT INFO: court hold, door alarm on.

## 2020-11-06 NOTE — PLAN OF CARE
DATE & TIME: 11/6/2020 7037-4987          Cognitive Concerns/ Orientation : Alert to self and sometimes place.   BEHAVIOR & AGGRESSION TOOL COLOR: Green and yellow, pt appeared anxious today, paced around the room and kept asking when he can leave. Received prn Zyprexa x1.    CIWA SCORE: NA  ABNL VS/O2: VSS on RA, daily Vitals check only  MOBILITY: IND in room  PAIN MANAGMENT: Denied pain.   DIET: regular. Good appetite.    BOWEL/BLADDER: continent   ABNL LAB/BG: na   DRAIN/DEVICES: na   TELEMETRY RHYTHM: na   SKIN: intact.   TESTS/PROCEDURES: na   D/C DAY/GOALS/PLACE: currently under civil commitment and court hold through [facility] until 7/31/21, pending paper work for funding and placement,  following.   OTHER IMPORTANT INFO: court hold, door alarm on place. Nicotine patch on Rt arm.

## 2020-11-06 NOTE — PROGRESS NOTES
[facility]    HOSPITALIST PROGRESS NOTE :   --------------------------------------------------    Date of Admission:  9/9/2020    Cumulative Summary: John Juárez is a 54 year old male with PMHx of schizophrenia, hx of TBI, alcohol use disorder, COPD, hyperlipidemia and hypothyroidism who was admitted on 9/9/2020 for evaluation of aggressive behavior at his group home. His medical condition remains stable, though his group home is now unwilling to take him back. Hospitalization has been prolonged while arranging for new placement.     Assessment & Plan     Neurocognitive disorder dt hx of TBI and alcohol use disorder  Schizophrenia  Had been residing in group home prior to admission.  Brought to hospital for evaluation of aggressive behaviors. Group home now unwilling to take him back. Has had numerous CODE 21s called this stay. Seen by psych, meds adjusted. Is currently under civil commitment and court hold through [facility] until 7/31/21.  Patient has been undergoing paper work for funding and placement.     -- Patient has been last evaluated by psych on September 29th , no recent change in med's   --.Patient has been doing well clinically  -- Currently patient is on Cogentin 0.25 mg twice daily, Haldol 7 mg twice daily, Namenda 5 mg daily and Effexor 75 mg p.o. daily, continue current meds   -- PRN zyprexa and haldol available for agitation has not needed any PRNs since 10/22  -- Patient did have an interview on Thursday 10/22 with [facility]; paper work filled and emailed; SW following  -- Discussed with social service , patient is awaiting funding which can take few weeks      Hyperlipidemia  -- Chronic and stable on aspirin and statin     COPD; Not O2 dependent.  -- Stable on salmeterol     Hypothyroidism  --Chronic and stable on levothyroxine     Tobacco Use  --Using nicotine patch    Diet: Room Service  Combination Diet Regular Diet Adult; Safe Tray - NO utensils     Valentin Catheter: not present  DVT Prophylaxis: Pneumatic Compression Devices and Ambulate every shift  Code Status: Full Code    The patient's care was discussed with the Bedside Nurse and Patient.    Disposition Plan   Expected discharge: Discharge date is unclear , pending safe disposition plan , SW are following , awaiting funding     Carmen Douglass MD, [facility]  Text Page (7am - 6pm)    ----------------------------------------------------------------------------------------------------------------------    Interval History    Patient was seen and examined , would like to go out for smoke , told me that he has his own home and he can go there , explained the court order to him .    -Data reviewed today: I reviewed all new labs and imaging results over the last 24 hours.    I personally reviewed no images or EKG's today.    Physical Exam   Temp: 97.8  F (36.6  C) Temp src: Oral BP: 106/65 Pulse: 75   Resp: 18 SpO2: 98 % O2 Device: None (Room air)    Vitals:    09/09/20 1754 09/25/20 0632   Weight: 70 kg (154 lb 5.2 oz) 71 kg (156 lb 8 oz)     Vital Signs with Ranges  Temp:  [97.8  F (36.6  C)] 97.8  F (36.6  C)  Pulse:  [72-75] 75  Resp:  [18] 18  BP: (105-106)/(65-67) 106/65  SpO2:  [98 %] 98 %  I/O last 3 completed shifts:  In: 1920 [P.O.:1920]  Out: -     GENERAL: Alert , awake and oriented. NAD. Conversational, appropriate.soft speaking   HEENT: Normocephalic. EOMI. No icterus or injection. Nares normal.   LUNGS: Clear to auscultation. No dyspnea at rest.   HEART: Regular rate. Extremities perfused.   ABDOMEN: Soft, nontender, and nondistended. Positive bowel sounds.   EXTREMITIES: No LE edema noted.   NEUROLOGIC: Moves extremities x4 on command. No acute focal neurologic abnormalities noted.     Medications       aspirin  81 mg Oral Daily     atorvastatin  80 mg Oral At Bedtime     benztropine  0.25 mg Oral BID     docusate sodium  100 mg Oral BID     haloperidol  7 mg Oral BID     levothyroxine  88 mcg Oral Daily      memantine  5 mg Oral Daily     nicotine  1 patch Transdermal Daily     nicotine   Transdermal TID     polyethylene glycol  17 g Oral Daily     salmeterol  1 puff Inhalation BID     venlafaxine  75 mg Oral Daily with breakfast       Data   Recent Labs   Lab 11/02/20  1019      POTASSIUM 4.2   CHLORIDE 111*   CO2 24   BUN 19   CR 0.71   ANIONGAP 7   LESLEE 9.0   *   ALBUMIN 3.4   PROTTOTAL 7.7   BILITOTAL 0.6   ALKPHOS 102   ALT 84*   AST 89*       Imaging:   No results found for this or any previous visit (from the past 24 hour(s)).

## 2020-11-06 NOTE — PROGRESS NOTES
SW:  Celia Layne, [phone], is the [facility] .  She has requested [facility] to request they submit their funding request.  Their nurse manager is requesting updated clinical information which writer is faxing.   Once MA and [facility] services are approved patient will be able to move the the [facility].

## 2020-11-07 PROCEDURE — 250N000013 HC RX MED GY IP 250 OP 250 PS 637: Performed by: NURSE PRACTITIONER

## 2020-11-07 PROCEDURE — 250N000013 HC RX MED GY IP 250 OP 250 PS 637: Performed by: HOSPITALIST

## 2020-11-07 PROCEDURE — 120N000001 HC R&B MED SURG/OB

## 2020-11-07 PROCEDURE — 250N000013 HC RX MED GY IP 250 OP 250 PS 637: Performed by: PSYCHIATRY & NEUROLOGY

## 2020-11-07 PROCEDURE — 250N000013 HC RX MED GY IP 250 OP 250 PS 637: Performed by: INTERNAL MEDICINE

## 2020-11-07 PROCEDURE — 99231 SBSQ HOSP IP/OBS SF/LOW 25: CPT | Performed by: INTERNAL MEDICINE

## 2020-11-07 RX ADMIN — SALMETEROL XINAFOATE 1 PUFF: 50 POWDER, METERED ORAL; RESPIRATORY (INHALATION) at 09:39

## 2020-11-07 RX ADMIN — NICOTINE 1 PATCH: 21 PATCH, EXTENDED RELEASE TRANSDERMAL at 09:38

## 2020-11-07 RX ADMIN — Medication 0.25 MG: at 09:36

## 2020-11-07 RX ADMIN — POLYETHYLENE GLYCOL 3350 17 G: 17 POWDER, FOR SOLUTION ORAL at 09:37

## 2020-11-07 RX ADMIN — VENLAFAXINE HYDROCHLORIDE 75 MG: 75 CAPSULE, EXTENDED RELEASE ORAL at 09:36

## 2020-11-07 RX ADMIN — LEVOTHYROXINE SODIUM 88 MCG: 88 TABLET ORAL at 09:36

## 2020-11-07 RX ADMIN — MEMANTINE 5 MG: 5 TABLET ORAL at 09:36

## 2020-11-07 RX ADMIN — HALOPERIDOL 7 MG: 5 TABLET ORAL at 09:36

## 2020-11-07 RX ADMIN — SALMETEROL XINAFOATE 1 PUFF: 50 POWDER, METERED ORAL; RESPIRATORY (INHALATION) at 21:36

## 2020-11-07 RX ADMIN — DOCUSATE SODIUM 100 MG: 100 CAPSULE, LIQUID FILLED ORAL at 09:36

## 2020-11-07 RX ADMIN — ASPIRIN 81 MG: 81 TABLET, COATED ORAL at 09:36

## 2020-11-07 RX ADMIN — Medication 0.25 MG: at 21:31

## 2020-11-07 RX ADMIN — HALOPERIDOL 7 MG: 5 TABLET ORAL at 21:31

## 2020-11-07 RX ADMIN — DOCUSATE SODIUM 100 MG: 100 CAPSULE, LIQUID FILLED ORAL at 21:32

## 2020-11-07 RX ADMIN — ATORVASTATIN CALCIUM 80 MG: 40 TABLET, FILM COATED ORAL at 21:31

## 2020-11-07 ASSESSMENT — ACTIVITIES OF DAILY LIVING (ADL)
ADLS_ACUITY_SCORE: 13

## 2020-11-07 NOTE — PROGRESS NOTES
[facility]    HOSPITALIST PROGRESS NOTE :   --------------------------------------------------    Date of Admission:  9/9/2020    Cumulative Summary: John Juárez is a 54 year old male with PMHx of schizophrenia, hx of TBI, alcohol use disorder, COPD, hyperlipidemia and hypothyroidism who was admitted on 9/9/2020 for evaluation of aggressive behavior at his group home. His medical condition remains stable, though his group home is now unwilling to take him back. Hospitalization has been prolonged while arranging for new placement.     Assessment & Plan     Neurocognitive disorder dt hx of TBI and alcohol use disorder  Schizophrenia  Had been residing in group home prior to admission.  Brought to hospital for evaluation of aggressive behaviors. Group home now unwilling to take him back. Has had numerous CODE 21s called this stay. Seen by psych, meds adjusted. Is currently under civil commitment and court hold through [facility] until 7/31/21.  Patient has been undergoing paper work for funding and placement.     -- Patient has been last evaluated by psych on September 29th , no recent change in med's   --.Patient has been doing well clinically  -- Currently patient is on Cogentin 0.25 mg twice daily, Haldol 7 mg twice daily, Namenda 5 mg daily and Effexor 75 mg p.o. daily, continue current meds   -- PRN zyprexa and haldol available for agitation has not needed any PRNs since 10/22  -- Patient did have an interview on Thursday 10/22 with [facility]; paper work filled and emailed; SW following  -- Discussed with social service , patient is awaiting funding which can take few weeks   -- per social workers: Celia Layne, [phone], is the [facility] .She has requested [facility] to request they submit their funding request.  Their nurse manager is requesting updated clinical information which writer is faxing.   Once MA and [facility] services are  approved patient will be able to move the the [facility]     Hyperlipidemia  -- Chronic and stable on aspirin and statin     COPD; Not O2 dependent.  -- Stable on salmeterol     Hypothyroidism  --Chronic and stable on levothyroxine     Tobacco Use  --Using nicotine patch    Diet: Room Service  Combination Diet Regular Diet Adult; Safe Tray - NO utensils    Valentin Catheter: not present  DVT Prophylaxis: Pneumatic Compression Devices and Ambulate every shift  Code Status: Full Code    The patient's care was discussed with the Bedside Nurse and Patient.    Disposition Plan   Expected discharge: Discharge date is unclear , pending safe disposition plan , SW are following as above  , awaiting funding     Carmen Douglass MD, FACP  Text Page (7am - 6pm)    ----------------------------------------------------------------------------------------------------------------------    Interval History    Patient was seen and examined , no new complaints     -Data reviewed today: I reviewed all new labs and imaging results over the last 24 hours.    I personally reviewed no images or EKG's today.    Physical Exam   Temp: 98.6  F (37  C) Temp src: Oral BP: 104/74 Pulse: 90   Resp: 18 SpO2: 96 % O2 Device: None (Room air)    Vitals:    09/09/20 1754 09/25/20 0632   Weight: 70 kg (154 lb 5.2 oz) 71 kg (156 lb 8 oz)     Vital Signs with Ranges  Temp:  [98.6  F (37  C)] 98.6  F (37  C)  Pulse:  [87-90] 90  Resp:  [18] 18  BP: ()/(53-74) 104/74  SpO2:  [96 %] 96 %  I/O last 3 completed shifts:  In: 400 [P.O.:400]  Out: -     GENERAL: Alert , awake and oriented. NAD. Conversational, appropriate.soft speaking   HEENT: Normocephalic. EOMI. No icterus or injection. Nares normal.   LUNGS: Clear to auscultation. No dyspnea at rest.   HEART: Regular rate. Extremities perfused.   ABDOMEN: Soft, nontender, and nondistended. Positive bowel sounds.   EXTREMITIES: No LE edema noted.   NEUROLOGIC: Moves extremities x4 on command. No acute  focal neurologic abnormalities noted.     Medications       aspirin  81 mg Oral Daily     atorvastatin  80 mg Oral At Bedtime     benztropine  0.25 mg Oral BID     docusate sodium  100 mg Oral BID     haloperidol  7 mg Oral BID     levothyroxine  88 mcg Oral Daily     memantine  5 mg Oral Daily     nicotine  1 patch Transdermal Daily     nicotine   Transdermal TID     polyethylene glycol  17 g Oral Daily     salmeterol  1 puff Inhalation BID     venlafaxine  75 mg Oral Daily with breakfast       Data   Recent Labs   Lab 11/02/20  1019      POTASSIUM 4.2   CHLORIDE 111*   CO2 24   BUN 19   CR 0.71   ANIONGAP 7   LESLEE 9.0   *   ALBUMIN 3.4   PROTTOTAL 7.7   BILITOTAL 0.6   ALKPHOS 102   ALT 84*   AST 89*       Imaging:   No results found for this or any previous visit (from the past 24 hour(s)).

## 2020-11-07 NOTE — PLAN OF CARE
DATE & TIME: 11/6/2020 1900-0730        Cognitive Concerns/ Orientation : Alert to self and sometimes place.   BEHAVIOR & AGGRESSION TOOL COLOR: Green and yellow, pt appeared anxious this jennie, set off door alarm twice, Said his friend was coming to pick him up and he ws leaving. gave prn Zyprexa x1 with bedtime meds.   CIWA SCORE: NA  ABNL VS/O2: VSS on RA, daily Vitals check only  MOBILITY: IND in room  PAIN MANAGMENT: Denied pain.   DIET: regular. Good appetite.    BOWEL/BLADDER: continent   ABNL LAB/BG: na   DRAIN/DEVICES: na   TELEMETRY RHYTHM: na   SKIN: intact.   TESTS/PROCEDURES: na   D/C DAY/GOALS/PLACE: currently under civil commitment and court hold through [facility] until 7/31/21, pending paper work for funding and placement, following.   OTHER IMPORTANT INFO: court hold, door alarm on place. Nicotine patch in  Rt arm.

## 2020-11-07 NOTE — PLAN OF CARE
DATE & TIME: 11/7/2020 3619-7028       Cognitive Concerns/ Orientation : Alert to self and sometimes place.   BEHAVIOR & AGGRESSION TOOL COLOR: Green  CIWA SCORE: NA  ABNL VS/O2: VSS on RA, daily Vitals check only  MOBILITY: IND in room  PAIN MANAGMENT: Denied pain.   DIET: regular. Good appetite.    BOWEL/BLADDER: continent   ABNL LAB/BG: na   DRAIN/DEVICES: na   TELEMETRY RHYTHM: na   SKIN: intact.   TESTS/PROCEDURES: na   D/C DAY/GOALS/PLACE: currently under civil commitment and court hold through [facility] until 7/31/21, pending paper work for funding and placement, following.   OTHER IMPORTANT INFO: court hold, door alarm on place. Nicotine patch in Lt arm.

## 2020-11-08 PROCEDURE — 250N000013 HC RX MED GY IP 250 OP 250 PS 637: Performed by: HOSPITALIST

## 2020-11-08 PROCEDURE — 99231 SBSQ HOSP IP/OBS SF/LOW 25: CPT | Performed by: INTERNAL MEDICINE

## 2020-11-08 PROCEDURE — 120N000001 HC R&B MED SURG/OB

## 2020-11-08 PROCEDURE — 250N000013 HC RX MED GY IP 250 OP 250 PS 637: Performed by: INTERNAL MEDICINE

## 2020-11-08 PROCEDURE — 250N000013 HC RX MED GY IP 250 OP 250 PS 637: Performed by: PSYCHIATRY & NEUROLOGY

## 2020-11-08 PROCEDURE — 250N000013 HC RX MED GY IP 250 OP 250 PS 637: Performed by: NURSE PRACTITIONER

## 2020-11-08 RX ADMIN — HALOPERIDOL 7 MG: 5 TABLET ORAL at 09:30

## 2020-11-08 RX ADMIN — LEVOTHYROXINE SODIUM 88 MCG: 88 TABLET ORAL at 09:29

## 2020-11-08 RX ADMIN — DOCUSATE SODIUM 100 MG: 100 CAPSULE, LIQUID FILLED ORAL at 21:37

## 2020-11-08 RX ADMIN — SALMETEROL XINAFOATE 1 PUFF: 50 POWDER, METERED ORAL; RESPIRATORY (INHALATION) at 09:33

## 2020-11-08 RX ADMIN — VENLAFAXINE HYDROCHLORIDE 75 MG: 75 CAPSULE, EXTENDED RELEASE ORAL at 09:29

## 2020-11-08 RX ADMIN — MEMANTINE 5 MG: 5 TABLET ORAL at 09:29

## 2020-11-08 RX ADMIN — DOCUSATE SODIUM 100 MG: 100 CAPSULE, LIQUID FILLED ORAL at 09:29

## 2020-11-08 RX ADMIN — SALMETEROL XINAFOATE 1 PUFF: 50 POWDER, METERED ORAL; RESPIRATORY (INHALATION) at 21:41

## 2020-11-08 RX ADMIN — Medication 0.25 MG: at 09:29

## 2020-11-08 RX ADMIN — ASPIRIN 81 MG: 81 TABLET, COATED ORAL at 09:29

## 2020-11-08 RX ADMIN — OLANZAPINE 10 MG: 5 TABLET, ORALLY DISINTEGRATING ORAL at 18:02

## 2020-11-08 RX ADMIN — POLYETHYLENE GLYCOL 3350 17 G: 17 POWDER, FOR SOLUTION ORAL at 09:30

## 2020-11-08 RX ADMIN — Medication 0.25 MG: at 21:37

## 2020-11-08 RX ADMIN — ATORVASTATIN CALCIUM 80 MG: 40 TABLET, FILM COATED ORAL at 21:37

## 2020-11-08 RX ADMIN — NICOTINE 1 PATCH: 21 PATCH, EXTENDED RELEASE TRANSDERMAL at 09:32

## 2020-11-08 RX ADMIN — HALOPERIDOL 7 MG: 5 TABLET ORAL at 21:37

## 2020-11-08 ASSESSMENT — ACTIVITIES OF DAILY LIVING (ADL)
ADLS_ACUITY_SCORE: 13

## 2020-11-08 NOTE — PROGRESS NOTES
[facility]    HOSPITALIST PROGRESS NOTE :   --------------------------------------------------    Date of Admission:  9/9/2020    Cumulative Summary: John Juárez is a 54 year old male with PMHx of schizophrenia, hx of TBI, alcohol use disorder, COPD, hyperlipidemia and hypothyroidism who was admitted on 9/9/2020 for evaluation of aggressive behavior at his group home. His medical condition remains stable, though his group home is now unwilling to take him back. Hospitalization has been prolonged while arranging for new placement.     Assessment & Plan     Neurocognitive disorder dt hx of TBI and alcohol use disorder  Schizophrenia  Had been residing in group home prior to admission.  Brought to hospital for evaluation of aggressive behaviors. Group home now unwilling to take him back. Has had numerous CODE 21s called this stay. Seen by psych, meds adjusted. Is currently under civil commitment and court hold through [facility] until 7/31/21.  Patient has been undergoing paper work for funding and placement.     -- Patient has been last evaluated by psych on September 29th , no recent change in med's   --.Patient has been doing well clinically  -- Currently patient is on Cogentin 0.25 mg twice daily, Haldol 7 mg twice daily, Namenda 5 mg daily and Effexor 75 mg p.o. daily, continue current meds   -- PRN zyprexa and haldol available for agitation has not needed any PRNs since 10/22  -- Patient did have an interview on Thursday 10/22 with [facility]; paper work filled and emailed; SW following  -- Discussed with social service , patient is awaiting funding which can take few weeks   -- per social workers: Celia Layne, [phone], is the [facility] .She has requested [facility] to request they submit their funding request.  Their nurse manager is requesting updated clinical information which writer is faxing.   Once MA and [facility] services are  approved patient will be able to move the the [facility]     Hyperlipidemia  -- Chronic and stable on aspirin and statin     COPD; Not O2 dependent.  -- Stable on salmeterol     Hypothyroidism  --Chronic and stable on levothyroxine     Tobacco Use  --Using nicotine patch    Diet: Room Service  Combination Diet Regular Diet Adult; Safe Tray - NO utensils    Valentin Catheter: not present  DVT Prophylaxis: Pneumatic Compression Devices and Ambulate every shift  Code Status: Full Code    The patient's care was discussed with the Bedside Nurse and Patient.    Disposition Plan   Expected discharge: Discharge date is unclear , pending safe disposition plan , SW are following as above  , awaiting funding     Carmen Douglass MD, FACP  Text Page (7am - 6pm)    ----------------------------------------------------------------------------------------------------------------------    Interval History    Patient was seen and examined, denying any chest pain or SOB.    -Data reviewed today: I reviewed all new labs and imaging results over the last 24 hours.    I personally reviewed no images or EKG's today.    Physical Exam   Temp: 98.7  F (37.1  C) Temp src: Oral BP: 102/74 Pulse: 80   Resp: 18 SpO2: 96 % O2 Device: None (Room air)    Vitals:    09/09/20 1754 09/25/20 0632   Weight: 70 kg (154 lb 5.2 oz) 71 kg (156 lb 8 oz)     Vital Signs with Ranges  Temp:  [98.7  F (37.1  C)] 98.7  F (37.1  C)  Pulse:  [80] 80  Resp:  [18] 18  BP: ()/(54-74) 102/74  SpO2:  [96 %] 96 %  No intake/output data recorded.    GENERAL: Alert , awake and oriented. NAD. Conversational, appropriate.soft speaking   HEENT: Normocephalic. EOMI. No icterus or injection. Nares normal.   LUNGS: Clear to auscultation. No dyspnea at rest.   HEART: Regular rate. Extremities perfused.   ABDOMEN: Soft, nontender, and nondistended. Positive bowel sounds.   EXTREMITIES: No LE edema noted.   NEUROLOGIC: Moves extremities x4 on command. No acute focal  neurologic abnormalities noted.     Medications       aspirin  81 mg Oral Daily     atorvastatin  80 mg Oral At Bedtime     benztropine  0.25 mg Oral BID     docusate sodium  100 mg Oral BID     haloperidol  7 mg Oral BID     levothyroxine  88 mcg Oral Daily     memantine  5 mg Oral Daily     nicotine  1 patch Transdermal Daily     nicotine   Transdermal TID     polyethylene glycol  17 g Oral Daily     salmeterol  1 puff Inhalation BID     venlafaxine  75 mg Oral Daily with breakfast       Data   Recent Labs   Lab 11/02/20  1019      POTASSIUM 4.2   CHLORIDE 111*   CO2 24   BUN 19   CR 0.71   ANIONGAP 7   LESLEE 9.0   *   ALBUMIN 3.4   PROTTOTAL 7.7   BILITOTAL 0.6   ALKPHOS 102   ALT 84*   AST 89*       Imaging:   No results found for this or any previous visit (from the past 24 hour(s)).

## 2020-11-08 NOTE — PLAN OF CARE
DATE & TIME: 11/8/2020 0700-1930       Cognitive Concerns/ Orientation : Alert to self and sometimes place.   BEHAVIOR & AGGRESSION TOOL COLOR: Green  CIWA SCORE: NA  ABNL VS/O2: VSS on RA  MOBILITY: IND in room  PAIN MANAGMENT: Denied pain.   DIET: regular. Good appetite.    BOWEL/BLADDER: continent   ABNL LAB/BG: na   DRAIN/DEVICES: na   TELEMETRY RHYTHM: na   SKIN: intact.   TESTS/PROCEDURES: na   D/C DAY/GOALS/PLACE: currently under civil commitment and court hold through [facility] until 7/31/21, pending paper work for funding and placement, following.   OTHER IMPORTANT INFO: court hold, door alarm on place. Nicotine patch in Rt arm.     Update [id number]  Pt restless this shift, received prn Zyprexa x1.

## 2020-11-08 NOTE — PLAN OF CARE
DATE & TIME: 11.7.20 1900-0700    Cognitive Concerns/ Orientation : AOx3: Disoriented to time   BEHAVIOR & AGGRESSION TOOL COLOR: Green  CIWA SCORE: NA   ABNL VS/O2: VSS on RA.   MOBILITY: Independent in room  PAIN MANAGMENT: Denies  DIET: Regular  BOWEL/BLADDER: Continent  ABNL LAB/BG: NA  DRAIN/DEVICES: No PIV: MD aware  TELEMETRY RHYTHM: NA  SKIN: Intact  TESTS/PROCEDURES: NA  D/C DAY/GOALS/PLACE: Under civil commitment and court hold through [facility] until 7/31/21: pending paper work for funding and placement, following  OTHER IMPORTANT INFO: Court hold. Door alarm. Nicotine patch on left shoulder. Slept well overnight.

## 2020-11-09 PROCEDURE — 250N000013 HC RX MED GY IP 250 OP 250 PS 637: Performed by: INTERNAL MEDICINE

## 2020-11-09 PROCEDURE — 250N000013 HC RX MED GY IP 250 OP 250 PS 637: Performed by: PSYCHIATRY & NEUROLOGY

## 2020-11-09 PROCEDURE — 99231 SBSQ HOSP IP/OBS SF/LOW 25: CPT | Performed by: INTERNAL MEDICINE

## 2020-11-09 PROCEDURE — 120N000001 HC R&B MED SURG/OB

## 2020-11-09 PROCEDURE — 250N000013 HC RX MED GY IP 250 OP 250 PS 637: Performed by: HOSPITALIST

## 2020-11-09 RX ADMIN — SALMETEROL XINAFOATE 1 PUFF: 50 POWDER, METERED ORAL; RESPIRATORY (INHALATION) at 08:38

## 2020-11-09 RX ADMIN — VENLAFAXINE HYDROCHLORIDE 75 MG: 75 CAPSULE, EXTENDED RELEASE ORAL at 08:33

## 2020-11-09 RX ADMIN — DOCUSATE SODIUM 100 MG: 100 CAPSULE, LIQUID FILLED ORAL at 08:33

## 2020-11-09 RX ADMIN — MEMANTINE 5 MG: 5 TABLET ORAL at 08:32

## 2020-11-09 RX ADMIN — SALMETEROL XINAFOATE 1 PUFF: 50 POWDER, METERED ORAL; RESPIRATORY (INHALATION) at 21:42

## 2020-11-09 RX ADMIN — ATORVASTATIN CALCIUM 80 MG: 40 TABLET, FILM COATED ORAL at 21:41

## 2020-11-09 RX ADMIN — HALOPERIDOL 7 MG: 5 TABLET ORAL at 08:33

## 2020-11-09 RX ADMIN — LEVOTHYROXINE SODIUM 88 MCG: 88 TABLET ORAL at 08:32

## 2020-11-09 RX ADMIN — Medication 0.25 MG: at 21:41

## 2020-11-09 RX ADMIN — HALOPERIDOL 7 MG: 5 TABLET ORAL at 21:41

## 2020-11-09 RX ADMIN — POLYETHYLENE GLYCOL 3350 17 G: 17 POWDER, FOR SOLUTION ORAL at 08:32

## 2020-11-09 RX ADMIN — Medication 0.25 MG: at 08:32

## 2020-11-09 RX ADMIN — ASPIRIN 81 MG: 81 TABLET, COATED ORAL at 08:32

## 2020-11-09 RX ADMIN — DOCUSATE SODIUM 100 MG: 100 CAPSULE, LIQUID FILLED ORAL at 21:41

## 2020-11-09 NOTE — PLAN OF CARE
DATE & TIME: 11/8/2020 4021-5602      Cognitive Concerns/ Orientation : Alert to self and sometimes place.   BEHAVIOR & AGGRESSION TOOL COLOR: Green  CIWA SCORE: NA  ABNL VS/O2: VSS on RA  MOBILITY: IND in room  PAIN MANAGMENT: Denied pain.   DIET: regular. Good appetite.    BOWEL/BLADDER: continent   ABNL LAB/BG: na   DRAIN/DEVICES: na   TELEMETRY RHYTHM: na   SKIN: intact.   TESTS/PROCEDURES: na   D/C DAY/GOALS/PLACE: currently under civil commitment and court hold through [facility] until 7/31/21, pending paper work for funding and placement, following.   OTHER IMPORTANT INFO: court hold, door alarm on place.

## 2020-11-09 NOTE — PLAN OF CARE
Alert to self. Up ad holley. VSS. Lungs clear. Tolerating diet. Voiding without difficulty. Plan is to discharge to TCU when bed verified. MA and waiver services need approval before discharge to [facility].

## 2020-11-10 PROCEDURE — 99231 SBSQ HOSP IP/OBS SF/LOW 25: CPT | Performed by: HOSPITALIST

## 2020-11-10 PROCEDURE — 120N000001 HC R&B MED SURG/OB

## 2020-11-10 PROCEDURE — 250N000013 HC RX MED GY IP 250 OP 250 PS 637: Performed by: INTERNAL MEDICINE

## 2020-11-10 PROCEDURE — 250N000013 HC RX MED GY IP 250 OP 250 PS 637: Performed by: PSYCHIATRY & NEUROLOGY

## 2020-11-10 PROCEDURE — 250N000013 HC RX MED GY IP 250 OP 250 PS 637: Performed by: NURSE PRACTITIONER

## 2020-11-10 PROCEDURE — 250N000013 HC RX MED GY IP 250 OP 250 PS 637: Performed by: HOSPITALIST

## 2020-11-10 RX ADMIN — NICOTINE 1 PATCH: 21 PATCH, EXTENDED RELEASE TRANSDERMAL at 09:35

## 2020-11-10 RX ADMIN — ASPIRIN 81 MG: 81 TABLET, COATED ORAL at 09:35

## 2020-11-10 RX ADMIN — SALMETEROL XINAFOATE 1 PUFF: 50 POWDER, METERED ORAL; RESPIRATORY (INHALATION) at 09:41

## 2020-11-10 RX ADMIN — ATORVASTATIN CALCIUM 80 MG: 40 TABLET, FILM COATED ORAL at 21:29

## 2020-11-10 RX ADMIN — HALOPERIDOL 7 MG: 5 TABLET ORAL at 21:29

## 2020-11-10 RX ADMIN — HALOPERIDOL 7 MG: 5 TABLET ORAL at 09:35

## 2020-11-10 RX ADMIN — OLANZAPINE 10 MG: 5 TABLET, ORALLY DISINTEGRATING ORAL at 17:32

## 2020-11-10 RX ADMIN — MEMANTINE 5 MG: 5 TABLET ORAL at 09:35

## 2020-11-10 RX ADMIN — LEVOTHYROXINE SODIUM 88 MCG: 88 TABLET ORAL at 09:35

## 2020-11-10 RX ADMIN — NICOTINE POLACRILEX 2 MG: 2 GUM, CHEWING ORAL at 20:27

## 2020-11-10 RX ADMIN — DOCUSATE SODIUM 100 MG: 100 CAPSULE, LIQUID FILLED ORAL at 21:29

## 2020-11-10 RX ADMIN — VENLAFAXINE HYDROCHLORIDE 75 MG: 75 CAPSULE, EXTENDED RELEASE ORAL at 09:36

## 2020-11-10 RX ADMIN — Medication 0.25 MG: at 21:29

## 2020-11-10 RX ADMIN — Medication 0.25 MG: at 09:35

## 2020-11-10 ASSESSMENT — ACTIVITIES OF DAILY LIVING (ADL)
ADLS_ACUITY_SCORE: 12

## 2020-11-10 NOTE — PROGRESS NOTES
Care Management Follow Up    Length of Stay (days): 52    Expected Discharge Date: (Pending placement)     Concerns to be Addressed:       Patient plan of care discussed at interdisciplinary rounds: Yes    Anticipated Discharge Disposition:       Anticipated Discharge Services:    Anticipated Discharge DME:      Patient/family educated on Medicare website which has current facility and service quality ratings:    Education Provided on the Discharge Plan:    Patient/Family in Agreement with the Plan:      Referrals Placed by CM/SW: Post Acute Facilities  Private pay costs discussed: Not applicable    Additional Information:  Updated clinical information has been faxed to Emily ELIAS for [employer]  She requested the information in order to complete the 6970 form needed for [facility] to approve his TBI wavier funding.   Writer encouraged Emily to speak with the hospital nursing staff if she has further questions regarding patient care needs. She has the [address] unit number. She will be completing the 6970 this week and send to the TBI Intake  Celia Contreras, ASIMSW

## 2020-11-10 NOTE — PLAN OF CARE
DATE & TIME: 11/10/2020 1351-1850  Cognitive Concerns/ Orientation : Alert to self and sometimes place.   BEHAVIOR & AGGRESSION TOOL COLOR: Green  CIWA SCORE: NA  ABNL VS/O2: VSS on RA  MOBILITY: IND in room  PAIN MANAGMENT: Denied pain.   DIET: regular   BOWEL/BLADDER: continent   ABNL LAB/BG: na   DRAIN/DEVICES: na   TELEMETRY RHYTHM: na   SKIN: intact.   TESTS/PROCEDURES: na   D/C DAY/GOALS/PLACE: currently under civil commitment and court hold through [facility] until 7/31/21, pending paper work for funding and placement, following.   OTHER IMPORTANT INFO: court hold, door alarm on place.

## 2020-11-10 NOTE — PLAN OF CARE
DATE & TIME: 11/10/2020 5595-7413       Cognitive Concerns/ Orientation : Alert and oriented to self only.   BEHAVIOR & AGGRESSION TOOL COLOR: Green  CIWA SCORE: NA  ABNL VS/O2: VSS on RA  MOBILITY: IND in room  PAIN MANAGMENT: Denied pain.   DIET: regular. Good appetite.    BOWEL/BLADDER: continent   ABNL LAB/BG: na   DRAIN/DEVICES: na   TELEMETRY RHYTHM: na   SKIN: intact.   TESTS/PROCEDURES: na   D/C DAY/GOALS/PLACE: currently under civil commitment and court hold through [facility] until 7/31/21, pending paper work for funding and placement, following.   OTHER IMPORTANT INFO: court hold, door alarm on place. Nicotine patch in Lt arm.     Update 5913-2788  Pt was little restless and kept saying he's leaving and bring his clothes. Received prn Zyprexa x1, not impulsive, redirectable.

## 2020-11-10 NOTE — PROGRESS NOTES
[facility]    HOSPITALIST PROGRESS NOTE :   --------------------------------------------------    Date of Admission:  9/9/2020    Cumulative Summary: John Juárez is a 54 year old male with PMHx of schizophrenia, hx of TBI, alcohol use disorder, COPD, hyperlipidemia and hypothyroidism who was admitted on 9/9/2020 for evaluation of aggressive behavior at his group home. His medical condition remains stable, though his group home is now unwilling to take him back. Hospitalization has been prolonged while arranging for new placement.     Assessment & Plan     Neurocognitive disorder dt hx of TBI and alcohol use disorder  Schizophrenia  Had been residing in group home prior to admission.  Brought to hospital for evaluation of aggressive behaviors. Group home now unwilling to take him back. Has had numerous CODE 21s called this stay. Seen by psych, meds adjusted. Is currently under civil commitment and court hold through [facility] until 7/31/21.  Patient has been undergoing paper work for funding and placement.     -- Patient has been last evaluated by psych on September 29th , no recent change in med's   --.Patient has been stable on current meds  -- Currently patient is on Cogentin 0.25 mg twice daily, Haldol 7 mg twice daily, Namenda 5 mg daily and Effexor 75 mg p.o. daily, continue current meds   -- PRN zyprexa and haldol available for agitation . Occasionally requires PRNS and last received Zyprexa on 11/8.  -- Patient did have an interview on Thursday 10/22 with [facility]; paper work filled and emailed; SW following  -- Discussed with social service , patient is awaiting funding which can take few weeks   -- per social workers: Celia Layne, [phone], is the [facility] .She has requested [facility] to submit their funding request.   Once MA and [facility] services are approved patient will be able to move the the [facility]     Hyperlipidemia  -- Chronic and stable on aspirin and statin     COPD; Not O2 dependent.  -- Stable on salmeterol     Hypothyroidism  --Chronic and stable on levothyroxine     Tobacco Use  --Using nicotine patch    Diet: Room Service  Combination Diet Regular Diet Adult; Safe Tray - NO utensils    Valentin Catheter: not present  DVT Prophylaxis: Pneumatic Compression Devices and Ambulate every shift  Code Status: Full Code    The patient's care was discussed with the Bedside Nurse and Patient.    Disposition Plan   Expected discharge: Discharge date is unclear , pending safe disposition plan , SW are following as above  , awaiting funding.  Anticipate another 1 to 2 weeks.    Ashtyn Barrientos MD, FACP  Text Page (7am - 6pm)    ----------------------------------------------------------------------------------------------------------------------    Interval History    Patient was seen in his room, he was laying in the bed.  He requested cigarette.  He was frustrated as to why he is still in the hospital and cannot leave.  Insists that he has been in the hospital for 4 to 5 months and also insists that it is June at present.    -Data reviewed today: I reviewed all new labs and imaging results over the last 24 hours.    I personally reviewed no images or EKG's today.    Physical Exam   Temp: 97.7  F (36.5  C) Temp src: Oral BP: 103/66 Pulse: 76   Resp: 20 SpO2: 98 % O2 Device: None (Room air)    Vitals:    09/09/20 1754 09/25/20 0632   Weight: 70 kg (154 lb 5.2 oz) 71 kg (156 lb 8 oz)     Vital Signs with Ranges  Temp:  [97.7  F (36.5  C)] 97.7  F (36.5  C)  Pulse:  [76] 76  Resp:  [20] 20  BP: (103)/(66) 103/66  SpO2:  [98 %] 98 %  I/O last 3 completed shifts:  In: 240 [P.O.:240]  Out: -     GENERAL: Alert , awake and disoriented to time.  No acute distress.  Appears disheveled.  LUNGS: Nonlabored breathing no dyspnea at rest.   HEART: RRR  ABDOMEN: Nondistended, nontender  EXTREMITIES: No LE edema noted.    NEUROLOGIC: Moves extremities x4 on command.  Alert, no facial asymmetry  Psych: Appears frustrated    Medications       aspirin  81 mg Oral Daily     atorvastatin  80 mg Oral At Bedtime     benztropine  0.25 mg Oral BID     docusate sodium  100 mg Oral BID     haloperidol  7 mg Oral BID     levothyroxine  88 mcg Oral Daily     memantine  5 mg Oral Daily     nicotine  1 patch Transdermal Daily     nicotine   Transdermal TID     polyethylene glycol  17 g Oral Daily     salmeterol  1 puff Inhalation BID     venlafaxine  75 mg Oral Daily with breakfast       Data   No lab results found in last 7 days.    Imaging:   No results found for this or any previous visit (from the past 24 hour(s)).

## 2020-11-11 PROCEDURE — 250N000013 HC RX MED GY IP 250 OP 250 PS 637: Performed by: INTERNAL MEDICINE

## 2020-11-11 PROCEDURE — 250N000013 HC RX MED GY IP 250 OP 250 PS 637: Performed by: PSYCHIATRY & NEUROLOGY

## 2020-11-11 PROCEDURE — 250N000013 HC RX MED GY IP 250 OP 250 PS 637: Performed by: NURSE PRACTITIONER

## 2020-11-11 PROCEDURE — 250N000013 HC RX MED GY IP 250 OP 250 PS 637: Performed by: HOSPITALIST

## 2020-11-11 PROCEDURE — 120N000001 HC R&B MED SURG/OB

## 2020-11-11 PROCEDURE — 99231 SBSQ HOSP IP/OBS SF/LOW 25: CPT | Performed by: HOSPITALIST

## 2020-11-11 RX ORDER — MAGNESIUM HYDROXIDE/ALUMINUM HYDROXICE/SIMETHICONE 120; 1200; 1200 MG/30ML; MG/30ML; MG/30ML
30 SUSPENSION ORAL EVERY 4 HOURS PRN
Status: DISCONTINUED | OUTPATIENT
Start: 2020-11-11 | End: 2021-06-30 | Stop reason: HOSPADM

## 2020-11-11 RX ADMIN — VENLAFAXINE HYDROCHLORIDE 75 MG: 75 CAPSULE, EXTENDED RELEASE ORAL at 08:58

## 2020-11-11 RX ADMIN — ATORVASTATIN CALCIUM 80 MG: 40 TABLET, FILM COATED ORAL at 20:41

## 2020-11-11 RX ADMIN — Medication 0.25 MG: at 20:41

## 2020-11-11 RX ADMIN — MEMANTINE 5 MG: 5 TABLET ORAL at 08:58

## 2020-11-11 RX ADMIN — ASPIRIN 81 MG: 81 TABLET, COATED ORAL at 08:58

## 2020-11-11 RX ADMIN — NICOTINE POLACRILEX 2 MG: 2 GUM, CHEWING ORAL at 19:02

## 2020-11-11 RX ADMIN — HALOPERIDOL 7 MG: 5 TABLET ORAL at 08:58

## 2020-11-11 RX ADMIN — LEVOTHYROXINE SODIUM 88 MCG: 88 TABLET ORAL at 08:58

## 2020-11-11 RX ADMIN — POLYETHYLENE GLYCOL 3350 17 G: 17 POWDER, FOR SOLUTION ORAL at 08:58

## 2020-11-11 RX ADMIN — SALMETEROL XINAFOATE 1 PUFF: 50 POWDER, METERED ORAL; RESPIRATORY (INHALATION) at 20:41

## 2020-11-11 RX ADMIN — Medication 0.25 MG: at 11:03

## 2020-11-11 RX ADMIN — DOCUSATE SODIUM 100 MG: 100 CAPSULE, LIQUID FILLED ORAL at 20:41

## 2020-11-11 RX ADMIN — HALOPERIDOL 7 MG: 5 TABLET ORAL at 20:41

## 2020-11-11 RX ADMIN — SALMETEROL XINAFOATE 1 PUFF: 50 POWDER, METERED ORAL; RESPIRATORY (INHALATION) at 08:58

## 2020-11-11 RX ADMIN — DOCUSATE SODIUM 100 MG: 100 CAPSULE, LIQUID FILLED ORAL at 08:58

## 2020-11-11 RX ADMIN — NICOTINE 1 PATCH: 21 PATCH, EXTENDED RELEASE TRANSDERMAL at 08:59

## 2020-11-11 ASSESSMENT — ACTIVITIES OF DAILY LIVING (ADL)
ADLS_ACUITY_SCORE: 13

## 2020-11-11 NOTE — PLAN OF CARE
DATE & TIME: 11/11/2020 0464-8911       Cognitive Concerns/ Orientation : Alert and oriented to self only.   BEHAVIOR & AGGRESSION TOOL COLOR: Green  CIWA SCORE: NA  ABNL VS/O2: VSS on RA, checking vitals once daily.   MOBILITY: IND in room  PAIN MANAGMENT: Denied pain.   DIET: regular. Good appetite.    BOWEL/BLADDER: continent   ABNL LAB/BG: na   DRAIN/DEVICES: na   TELEMETRY RHYTHM: na   SKIN: intact.   TESTS/PROCEDURES: na   D/C DAY/GOALS/PLACE: currently under civil commitment and court hold through [facility] until 7/31/21, pending paper work for funding and placement, following.   OTHER IMPORTANT INFO: court hold, door alarm on place. Nicotine patch in Lt arm.

## 2020-11-11 NOTE — PLAN OF CARE
DATE & TIME: 11/11/20 8212-0934 Cognitive Concerns/ Orientation : A&O to self  BEHAVIOR & AGGRESSION TOOL COLOR: Green   CIWA SCORE: N/A   ABNL VS/O2: VS daily, stable on RA  MOBILITY: Independent in the room  PAIN MANAGMENT: Denies  DIET: Regular  BOWEL/BLADDER: BS active x 4; continent   ABNL LAB/BG: None new  DRAIN/DEVICES: None  TELEMETRY RHYTHM: N/A  SKIN: Scattered bruising; intact   TESTS/PROCEDURES:N/A  D/C DAY/GOALS/PLACE:  Currently under civil commitment and court hold through [facility] until 7/31/21, pending paper work for funding and placement; SW following.  OTHER IMPORTANT INFO: Nicotine patch in place L. Arm; court hold; door alarm in place.  MD/RN ROUNDING SIGNED OFF D/E SHIFT: N/A  COMMIT TO SIT DONE AND SIGNED OFF: COMPLETE

## 2020-11-11 NOTE — PLAN OF CARE
DATE & TIME: 11/10/2020; 3722-8861   Cognitive Concerns/ Orientation : A & O x 4  BEHAVIOR & AGGRESSION TOOL COLOR: Green   CIWA SCORE: N/A   ABNL VS/O2: VSS on RA  MOBILITY: independent in the room  PAIN MANAGMENT: Denies  DIET: Regular; tolerating well   BOWEL/BLADDER: BS active x 4; continent   ABNL LAB/BG: ALT=84; AST=89   DRAIN/DEVICES: N/A  TELEMETRY RHYTHM: N/A  SKIN: Scattered bruising; intact   TESTS/PROCEDURES:N/A  D/C DAY/GOALS/PLACE: pending discharge- placement  OTHER IMPORTANT INFO: Nicotine patch in place L. Arm; PRN nicotine gum given; court hold; door alarm in place; Nursing will continue to monitor and assess.     MD/RN ROUNDING SIGNED OFF D/E SHIFT: N/A  COMMIT TO SIT DONE AND SIGNED OFF: COMPLETE

## 2020-11-11 NOTE — PROGRESS NOTES
[facility]    HOSPITALIST PROGRESS NOTE :   --------------------------------------------------    Date of Admission:  9/9/2020    Cumulative Summary: John Juárez is a 54 year old male with PMHx of schizophrenia, hx of TBI, alcohol use disorder, COPD, hyperlipidemia and hypothyroidism who was admitted on 9/9/2020 for evaluation of aggressive behavior at his group home. His medical condition remains stable, though his group home is now unwilling to take him back. Hospitalization has been prolonged while arranging for new placement.     Assessment & Plan     Neurocognitive disorder dt hx of TBI and alcohol use disorder  Schizophrenia  Had been residing in group home prior to admission.  Brought to hospital for evaluation of aggressive behaviors. Group home now unwilling to take him back. Has had numerous CODE 21s called this stay. Seen by psych, meds adjusted. Is currently under civil commitment and court hold through [facility] until 7/31/21.  Patient has been undergoing paper work for funding and placement.     -- Patient has been last evaluated by psych on September 29th , no recent change in med's   --.Patient has been stable on current meds  -- Currently patient is on Cogentin 0.25 mg twice daily, Haldol 7 mg twice daily, Namenda 5 mg daily and Effexor 75 mg p.o. daily, continue current meds   -- PRN zyprexa and haldol available for agitation . Occasionally requires PRNS .  -- Patient did have an interview on Thursday 10/22 with [facility]; paper work filled and emailed; SW following  -- Discussed with social service , patient is awaiting funding which can take few weeks   -- per social workers: Celia Layne, [phone][facility] .She has requested [facility] to submit their funding request.   Once MA and [facility] services are approved patient will be able to move the the [facility]     Hyperlipidemia  -- Chronic and stable  on aspirin and statin     COPD; Not O2 dependent.  -- Stable on salmeterol     Hypothyroidism  --Chronic and stable on levothyroxine     Tobacco Use  --Using nicotine patch    Diet: Room Service  Combination Diet Regular Diet Adult; Safe Tray - NO utensils    Valentin Catheter: not present  DVT Prophylaxis: Pneumatic Compression Devices and Ambulate every shift  Code Status: Full Code    The patient's care was discussed with the Bedside Nurse and Patient.    Disposition Plan   Expected discharge: Discharge date is unclear , pending safe disposition plan , SW are following as above  , awaiting funding.  Anticipate another 1 to 2 weeks.    Ashtyn Barrientos MD, FACP  Text Page (7am - 6pm)    ----------------------------------------------------------------------------------------------------------------------    Interval History    Patient was seen in his room, he was laying in the bed.  Denied any concerns or complaints.  Asked if he could eat.    -Data reviewed today: I reviewed all new labs and imaging results over the last 24 hours.    I personally reviewed no images or EKG's today.    Physical Exam   Temp: 97.7  F (36.5  C) Temp src: Oral BP: 101/68 Pulse: 75   Resp: 18 SpO2: 98 % O2 Device: None (Room air)    Vitals:    09/09/20 1754 09/25/20 0632   Weight: 70 kg (154 lb 5.2 oz) 71 kg (156 lb 8 oz)     Vital Signs with Ranges  Temp:  [97.7  F (36.5  C)] 97.7  F (36.5  C)  Pulse:  [75] 75  Resp:  [18] 18  BP: (101)/(68) 101/68  SpO2:  [98 %] 98 %  I/O last 3 completed shifts:  In: 240 [P.O.:240]  Out: -     GENERAL: Alert , awake and disoriented to time.  No acute distress.  Appears disheveled.  LUNGS: Nonlabored breathing no dyspnea at rest.   HEART: RRR  ABDOMEN: Nondistended, nontender  EXTREMITIES: No LE edema noted.   NEUROLOGIC: Moves extremities x4 on command.  Alert, no facial asymmetry  Psych: Calm    Medications       aspirin  81 mg Oral Daily     atorvastatin  80 mg Oral At Bedtime     benztropine  0.25 mg  Oral BID     docusate sodium  100 mg Oral BID     haloperidol  7 mg Oral BID     levothyroxine  88 mcg Oral Daily     memantine  5 mg Oral Daily     nicotine  1 patch Transdermal Daily     nicotine   Transdermal TID     polyethylene glycol  17 g Oral Daily     salmeterol  1 puff Inhalation BID     venlafaxine  75 mg Oral Daily with breakfast       Data   No lab results found in last 7 days.    Imaging:   No results found for this or any previous visit (from the past 24 hour(s)).

## 2020-11-12 PROCEDURE — 250N000013 HC RX MED GY IP 250 OP 250 PS 637: Performed by: NURSE PRACTITIONER

## 2020-11-12 PROCEDURE — 250N000013 HC RX MED GY IP 250 OP 250 PS 637: Performed by: HOSPITALIST

## 2020-11-12 PROCEDURE — 120N000001 HC R&B MED SURG/OB

## 2020-11-12 PROCEDURE — 250N000013 HC RX MED GY IP 250 OP 250 PS 637: Performed by: PSYCHIATRY & NEUROLOGY

## 2020-11-12 PROCEDURE — 99231 SBSQ HOSP IP/OBS SF/LOW 25: CPT | Performed by: HOSPITALIST

## 2020-11-12 PROCEDURE — 250N000013 HC RX MED GY IP 250 OP 250 PS 637: Performed by: INTERNAL MEDICINE

## 2020-11-12 RX ADMIN — MEMANTINE 5 MG: 5 TABLET ORAL at 08:46

## 2020-11-12 RX ADMIN — SALMETEROL XINAFOATE 1 PUFF: 50 POWDER, METERED ORAL; RESPIRATORY (INHALATION) at 20:13

## 2020-11-12 RX ADMIN — NICOTINE POLACRILEX 2 MG: 2 GUM, CHEWING ORAL at 20:12

## 2020-11-12 RX ADMIN — DOCUSATE SODIUM 100 MG: 100 CAPSULE, LIQUID FILLED ORAL at 20:12

## 2020-11-12 RX ADMIN — HALOPERIDOL 7 MG: 5 TABLET ORAL at 20:11

## 2020-11-12 RX ADMIN — POLYETHYLENE GLYCOL 3350 17 G: 17 POWDER, FOR SOLUTION ORAL at 08:47

## 2020-11-12 RX ADMIN — NICOTINE 1 PATCH: 21 PATCH, EXTENDED RELEASE TRANSDERMAL at 08:47

## 2020-11-12 RX ADMIN — ATORVASTATIN CALCIUM 80 MG: 40 TABLET, FILM COATED ORAL at 20:11

## 2020-11-12 RX ADMIN — MELATONIN TAB 3 MG 3 MG: 3 TAB at 20:12

## 2020-11-12 RX ADMIN — Medication 0.25 MG: at 20:12

## 2020-11-12 RX ADMIN — Medication 0.25 MG: at 08:46

## 2020-11-12 RX ADMIN — ASPIRIN 81 MG: 81 TABLET, COATED ORAL at 08:46

## 2020-11-12 RX ADMIN — DOCUSATE SODIUM 100 MG: 100 CAPSULE, LIQUID FILLED ORAL at 08:46

## 2020-11-12 RX ADMIN — HALOPERIDOL 7 MG: 5 TABLET ORAL at 08:46

## 2020-11-12 RX ADMIN — SALMETEROL XINAFOATE 1 PUFF: 50 POWDER, METERED ORAL; RESPIRATORY (INHALATION) at 10:25

## 2020-11-12 RX ADMIN — VENLAFAXINE HYDROCHLORIDE 75 MG: 75 CAPSULE, EXTENDED RELEASE ORAL at 08:46

## 2020-11-12 RX ADMIN — LEVOTHYROXINE SODIUM 88 MCG: 88 TABLET ORAL at 08:47

## 2020-11-12 ASSESSMENT — ACTIVITIES OF DAILY LIVING (ADL)
ADLS_ACUITY_SCORE: 13

## 2020-11-12 NOTE — PROGRESS NOTES
[facility]    HOSPITALIST PROGRESS NOTE :   --------------------------------------------------    Date of Admission:  9/9/2020    Cumulative Summary: John Juárez is a 54 year old male with PMHx of schizophrenia, hx of TBI, alcohol use disorder, COPD, hyperlipidemia and hypothyroidism who was admitted on 9/9/2020 for evaluation of aggressive behavior at his group home. His medical condition remains stable, though his group home is now unwilling to take him back. Hospitalization has been prolonged while arranging for new placement.     Assessment & Plan     Neurocognitive disorder dt hx of TBI and alcohol use disorder  Schizophrenia  Had been residing in group home prior to admission.  Brought to hospital for evaluation of aggressive behaviors. Group home now unwilling to take him back. Has had numerous CODE 21s called this stay. Seen by psych, meds adjusted. Is currently under civil commitment and court hold through [facility] until 7/31/21.  Patient has been undergoing paper work for funding and placement.     -- Patient has been last evaluated by psych on September 29th , no recent change in med's   --.Patient has been stable on current meds  -- Currently patient is on Cogentin 0.25 mg twice daily, Haldol 7 mg twice daily, Namenda 5 mg daily and Effexor 75 mg p.o. daily, continue current meds   -- PRN zyprexa and haldol available for agitation . Occasionally requires PRNS .  -- Patient did have an interview on Thursday 10/22 with [facility]; paper work filled and emailed; SW following  -- Discussed with social service , patient is awaiting funding which can take few weeks   -- per social workers: Celia Layne, [phone], is the [facility] [employer] .She has requested [facility] to submit their funding request.   Once MA and [facility] services are approved patient will be able to move the the [facility]     Hyperlipidemia  -- Chronic and stable  on aspirin and statin     COPD; Not O2 dependent.  -- Stable on salmeterol     Hypothyroidism  --Chronic and stable on levothyroxine     Tobacco Use  --Using nicotine patch    Diet: Room Service  Combination Diet Regular Diet Adult; Safe Tray - NO utensils    Valentin Catheter: not present  DVT Prophylaxis: Pneumatic Compression Devices and Ambulate every shift  Code Status: Full Code    The patient's care was discussed with the Bedside Nurse and Patient.    Disposition Plan   Expected discharge: Discharge date is unclear , pending safe disposition plan , SW are following as above  , awaiting funding.  Anticipate another 1 to 2 weeks.    Ashtyn Barrientos MD, FACP  Text Page (7am - 6pm)    ----------------------------------------------------------------------------------------------------------------------    Interval History    Patient was seen in his room, he was laying in the bed.  Denied any concerns or complaints.  Asked for a cigarette and nicotine gum.    -Data reviewed today: I reviewed all new labs and imaging results over the last 24 hours.    I personally reviewed no images or EKG's today.    Physical Exam   Temp: 97.1  F (36.2  C) Temp src: Oral BP: 99/66 Pulse: 68   Resp: 16 SpO2: 93 % O2 Device: None (Room air)    Vitals:    09/09/20 1754 09/25/20 0632   Weight: 70 kg (154 lb 5.2 oz) 71 kg (156 lb 8 oz)     Vital Signs with Ranges  Temp:  [97.1  F (36.2  C)] 97.1  F (36.2  C)  Pulse:  [68-78] 68  Resp:  [16] 16  BP: ()/(62-66) 99/66  SpO2:  [93 %-99 %] 93 %  I/O last 3 completed shifts:  In: 240 [P.O.:240]  Out: -     GENERAL: Alert , awake and disoriented to time.  No acute distress.  Appears disheveled.  LUNGS: Nonlabored breathing no dyspnea at rest.   HEART: RRR  ABDOMEN: Nondistended, nontender  EXTREMITIES: No LE edema noted.   NEUROLOGIC: Moves extremities x4 on command.  Alert, no facial asymmetry  Psych: Calm    Medications       aspirin  81 mg Oral Daily     atorvastatin  80 mg Oral At  Bedtime     benztropine  0.25 mg Oral BID     docusate sodium  100 mg Oral BID     haloperidol  7 mg Oral BID     levothyroxine  88 mcg Oral Daily     memantine  5 mg Oral Daily     nicotine  1 patch Transdermal Daily     nicotine   Transdermal TID     polyethylene glycol  17 g Oral Daily     salmeterol  1 puff Inhalation BID     venlafaxine  75 mg Oral Daily with breakfast       Data   No lab results found in last 7 days.    Imaging:   No results found for this or any previous visit (from the past 24 hour(s)).

## 2020-11-12 NOTE — PROGRESS NOTES
Pt denied pain, vitals stable on RA. Up Ind in room. Pt aggressive stating he needs his things and going to leave. Opening door multiple times. Difficult to re-direct with questions, changing subject to food/snakcs works well for him. Took pills w/o problem. Continue to find placement.

## 2020-11-12 NOTE — PLAN OF CARE
DATE & TIME: 11/12/2020 6:30 AM       Cognitive Concerns/ Orientation : Alert and oriented to self only. Pleasantly confused.   BEHAVIOR & AGGRESSION TOOL COLOR: Green; calm and cooperative.   CIWA SCORE: NA  ABNL VS/O2: Daily vs checks. Denies pain. LS diminished.   MOBILITY: IND in room; door alarm.   PAIN MANAGMENT: Denied pain.   DIET: regular. likes to snack   BOWEL/BLADDER: continent   ABNL LAB/BG: na   DRAIN/DEVICES: na   TELEMETRY RHYTHM: na   SKIN: intact.   TESTS/PROCEDURES: na   D/C DAY/GOALS/PLACE: currently under civil commitment and court hold through [facility] until 7/31/21, pending paper work for funding and placement, following.   OTHER IMPORTANT INFO: court hold, door alarm on place.

## 2020-11-12 NOTE — PLAN OF CARE
DATE & TIME: 11/12/2020 5301-0853      Cognitive Concerns/ Orientation : Alert and oriented to self only. Pleasantly confused.   BEHAVIOR & AGGRESSION TOOL COLOR: Green; calm and cooperative.   CIWA SCORE: NA  ABNL VS/O2: Daily vs checks. Denies pain. LS diminished.   MOBILITY: IND in room; door alarm.   PAIN MANAGMENT: Denied pain.   DIET: regular. likes to snack   BOWEL/BLADDER: continent   ABNL LAB/BG: na   DRAIN/DEVICES: na   TELEMETRY RHYTHM: na   SKIN: intact.   TESTS/PROCEDURES: na   D/C DAY/GOALS/PLACE: currently under civil commitment and court hold through [facility] until 7/31/21, pending paper work for funding and placement, following.   OTHER IMPORTANT INFO: court hold, door alarm on place. Nicotine patch on Rt arm.

## 2020-11-13 PROCEDURE — 120N000001 HC R&B MED SURG/OB

## 2020-11-13 PROCEDURE — 250N000013 HC RX MED GY IP 250 OP 250 PS 637: Performed by: PSYCHIATRY & NEUROLOGY

## 2020-11-13 PROCEDURE — 250N000013 HC RX MED GY IP 250 OP 250 PS 637: Performed by: INTERNAL MEDICINE

## 2020-11-13 PROCEDURE — 99231 SBSQ HOSP IP/OBS SF/LOW 25: CPT | Performed by: HOSPITALIST

## 2020-11-13 PROCEDURE — 250N000013 HC RX MED GY IP 250 OP 250 PS 637: Performed by: HOSPITALIST

## 2020-11-13 PROCEDURE — 250N000013 HC RX MED GY IP 250 OP 250 PS 637: Performed by: NURSE PRACTITIONER

## 2020-11-13 RX ADMIN — DOCUSATE SODIUM 100 MG: 100 CAPSULE, LIQUID FILLED ORAL at 09:05

## 2020-11-13 RX ADMIN — VENLAFAXINE HYDROCHLORIDE 75 MG: 75 CAPSULE, EXTENDED RELEASE ORAL at 09:05

## 2020-11-13 RX ADMIN — HALOPERIDOL 7 MG: 5 TABLET ORAL at 21:45

## 2020-11-13 RX ADMIN — SALMETEROL XINAFOATE 1 PUFF: 50 POWDER, METERED ORAL; RESPIRATORY (INHALATION) at 09:10

## 2020-11-13 RX ADMIN — MEMANTINE 5 MG: 5 TABLET ORAL at 09:05

## 2020-11-13 RX ADMIN — DOCUSATE SODIUM 100 MG: 100 CAPSULE, LIQUID FILLED ORAL at 21:45

## 2020-11-13 RX ADMIN — Medication 0.25 MG: at 21:45

## 2020-11-13 RX ADMIN — ASPIRIN 81 MG: 81 TABLET, COATED ORAL at 09:05

## 2020-11-13 RX ADMIN — LEVOTHYROXINE SODIUM 88 MCG: 88 TABLET ORAL at 09:05

## 2020-11-13 RX ADMIN — ATORVASTATIN CALCIUM 80 MG: 40 TABLET, FILM COATED ORAL at 21:45

## 2020-11-13 RX ADMIN — NICOTINE 1 PATCH: 21 PATCH, EXTENDED RELEASE TRANSDERMAL at 09:06

## 2020-11-13 RX ADMIN — Medication 0.25 MG: at 09:05

## 2020-11-13 RX ADMIN — HALOPERIDOL 7 MG: 5 TABLET ORAL at 09:06

## 2020-11-13 RX ADMIN — MELATONIN TAB 3 MG 3 MG: 3 TAB at 21:45

## 2020-11-13 RX ADMIN — POLYETHYLENE GLYCOL 3350 17 G: 17 POWDER, FOR SOLUTION ORAL at 09:05

## 2020-11-13 ASSESSMENT — ACTIVITIES OF DAILY LIVING (ADL)
ADLS_ACUITY_SCORE: 13

## 2020-11-13 NOTE — PLAN OF CARE
DATE & TIME: 11/13/2020 4739-5978      Cognitive Concerns/ Orientation : Alert and oriented to self only. Pleasantly confused.   BEHAVIOR & AGGRESSION TOOL COLOR: Green; calm and cooperative.   CIWA SCORE: NA  ABNL VS/O2: Daily vs checks. Denies pain. LS diminished.   MOBILITY: IND in room; door alarm.   PAIN MANAGMENT: Denied pain.   DIET: regular. likes to snack   BOWEL/BLADDER: continent   ABNL LAB/BG: na   DRAIN/DEVICES: na   TELEMETRY RHYTHM: na   SKIN: intact.   TESTS/PROCEDURES: na   D/C DAY/GOALS/PLACE: currently under civil commitment and court hold through [facility] until 7/31/21, pending paper work for funding and placement, following.   OTHER IMPORTANT INFO: court hold, door alarm on place. Nicotine patch on Lt arm.

## 2020-11-13 NOTE — PROGRESS NOTES
[facility]    HOSPITALIST PROGRESS NOTE :   --------------------------------------------------    Date of Admission:  9/9/2020    Cumulative Summary: John Juárez is a 54 year old male with PMHx of schizophrenia, hx of TBI, alcohol use disorder, COPD, hyperlipidemia and hypothyroidism who was admitted on 9/9/2020 for evaluation of aggressive behavior at his group home. His medical condition remains stable, though his group home is now unwilling to take him back. Hospitalization has been prolonged while arranging for new placement.     Assessment & Plan     Neurocognitive disorder dt hx of TBI and alcohol use disorder  Schizophrenia  Had been residing in group home prior to admission.  Brought to hospital for evaluation of aggressive behaviors. Group home now unwilling to take him back. Has had numerous CODE 21s called this stay. Seen by psych, meds adjusted. Is currently under civil commitment and court hold through [facility] until 7/31/21.  Patient has been undergoing paper work for funding and placement.     -- Patient has been last evaluated by psych on September 29th , no recent change in med's   --.Patient has been stable on current meds  -- Currently patient is on Cogentin 0.25 mg twice daily, Haldol 7 mg twice daily, Namenda 5 mg daily and Effexor 75 mg p.o. daily, continue current meds   -- PRN zyprexa and haldol available for agitation . Occasionally requires PRNS .  -- Patient did have an interview on Thursday 10/22 with [facility]; paper work filled and emailed; SW following  -- Discussed with social service , patient is awaiting funding which can take few weeks   -- per social workers: Celia Layne, [phone], is the [facility] [employer] .She has requested [facility] to submit their funding request.   Once MA and [facility] services are approved patient will be able to move the the [facility]     Hyperlipidemia  -- Chronic and stable  on aspirin and statin     COPD; Not O2 dependent.  -- Stable on salmeterol     Hypothyroidism  --Chronic and stable on levothyroxine     Tobacco Use  --Using nicotine patch    Diet: Room Service  Combination Diet Regular Diet Adult; Safe Tray - NO utensils    Valentin Catheter: not present  DVT Prophylaxis: Pneumatic Compression Devices and Ambulate every shift  Code Status: Full Code    The patient's care was discussed with the Bedside Nurse and Patient.    Disposition Plan   Expected discharge: Discharge date is unclear , pending safe disposition plan , SW are following as above  , awaiting funding.  Anticipate another 1 to 2 weeks.    Ashtyn Barrientos MD, FACP  Text Page (7am - 6pm)    ----------------------------------------------------------------------------------------------------------------------    Interval History    Patient was seen in his room, he was laying in the bed.  Denied any concerns or complaints.      -Data reviewed today: I reviewed all new labs and imaging results over the last 24 hours.    I personally reviewed no images or EKG's today.    Physical Exam   Temp: 97.4  F (36.3  C) Temp src: Oral BP: 102/70 Pulse: 75   Resp: 16 SpO2: 97 % O2 Device: None (Room air)    Vitals:    09/09/20 1754 09/25/20 0632   Weight: 70 kg (154 lb 5.2 oz) 71 kg (156 lb 8 oz)     Vital Signs with Ranges  Temp:  [97.4  F (36.3  C)] 97.4  F (36.3  C)  Pulse:  [75] 75  Resp:  [16] 16  BP: (102)/(70) 102/70  SpO2:  [97 %] 97 %  I/O last 3 completed shifts:  In: 360 [P.O.:360]  Out: -     GENERAL: Alert , awake and disoriented to time.  No acute distress.  Appears disheveled.  LUNGS: Nonlabored breathing no dyspnea at rest.   HEART: RRR  ABDOMEN: Nondistended, nontender  EXTREMITIES: No LE edema noted.   NEUROLOGIC: Moves extremities x4 on command.  Alert, no facial asymmetry  Psych: Calm    Medications       aspirin  81 mg Oral Daily     atorvastatin  80 mg Oral At Bedtime     benztropine  0.25 mg Oral BID     docusate  sodium  100 mg Oral BID     haloperidol  7 mg Oral BID     levothyroxine  88 mcg Oral Daily     memantine  5 mg Oral Daily     nicotine  1 patch Transdermal Daily     nicotine   Transdermal TID     polyethylene glycol  17 g Oral Daily     salmeterol  1 puff Inhalation BID     venlafaxine  75 mg Oral Daily with breakfast       Data   No lab results found in last 7 days.    Imaging:   No results found for this or any previous visit (from the past 24 hour(s)).

## 2020-11-13 NOTE — PLAN OF CARE
DATE & TIME: 11/12/2020 Evenings   Cognitive Concerns/ Orientation : Alert and oriented to self only. Pleasantly confused. Asked for cigarettes, so PRN nicorette administered.  BEHAVIOR & AGGRESSION TOOL COLOR: Green; calm and cooperative.   CIWA SCORE: NA  ABNL VS/O2: Daily vs checks. Denies pain. LS clear    MOBILITY: IND in room; door alarm.   PAIN MANAGMENT: Denied pain.   DIET: regular. likes to snack   BOWEL/BLADDER: continent   ABNL LAB/BG: na   DRAIN/DEVICES: na   TELEMETRY RHYTHM: na   SKIN: intact.   TESTS/PROCEDURES: na   D/C DAY/GOALS/PLACE: currently under civil commitment and court hold through [facility] until 7/31/21, pending paper work for funding and placement, following.   OTHER IMPORTANT INFO: court hold, door alarm on place. Nicotine patch on Rt arm.

## 2020-11-13 NOTE — PROGRESS NOTES
DATE & TIME: 11/12/2020 Night    Cognitive Concerns/ Orientation : Alert to self only  BEHAVIOR & AGGRESSION TOOL COLOR: Red (once red, always red). Behaviors tonight: Green   ABNL VS/O2: Once daily vitals  MOBILITY: Independent in room; Door alarm active  PAIN MANAGMENT: Absence of non-verbal indicators of pain  DIET: Regular  BOWEL/BLADDER: Continent  ABNL LAB/BG: No labs since 11/02, medically stable  DRAIN/DEVICES: No IV access  SKIN: Intact  TESTS/PROCEDURES: n/a  D/C DAY/GOALS/PLACE: Awaiting funding & Commitment process--to [facility] [place]  OTHER IMPORTANT INFO: Awakenings minimized, rest promoted. Pt under civil commitment and legal hold through [facility] valid until 07/31/2021. Nicotine patch on R arm  COMMIT TO SIT DONE AND SIGNED OFF Pt confused.

## 2020-11-14 PROCEDURE — 250N000013 HC RX MED GY IP 250 OP 250 PS 637: Performed by: INTERNAL MEDICINE

## 2020-11-14 PROCEDURE — 250N000013 HC RX MED GY IP 250 OP 250 PS 637: Performed by: PSYCHIATRY & NEUROLOGY

## 2020-11-14 PROCEDURE — 250N000013 HC RX MED GY IP 250 OP 250 PS 637: Performed by: NURSE PRACTITIONER

## 2020-11-14 PROCEDURE — 250N000013 HC RX MED GY IP 250 OP 250 PS 637: Performed by: HOSPITALIST

## 2020-11-14 PROCEDURE — 99231 SBSQ HOSP IP/OBS SF/LOW 25: CPT | Performed by: HOSPITALIST

## 2020-11-14 PROCEDURE — 120N000001 HC R&B MED SURG/OB

## 2020-11-14 RX ADMIN — HALOPERIDOL 7 MG: 5 TABLET ORAL at 09:54

## 2020-11-14 RX ADMIN — HALOPERIDOL 7 MG: 5 TABLET ORAL at 20:17

## 2020-11-14 RX ADMIN — LEVOTHYROXINE SODIUM 88 MCG: 88 TABLET ORAL at 09:54

## 2020-11-14 RX ADMIN — DOCUSATE SODIUM 100 MG: 100 CAPSULE, LIQUID FILLED ORAL at 09:54

## 2020-11-14 RX ADMIN — SALMETEROL XINAFOATE 1 PUFF: 50 POWDER, METERED ORAL; RESPIRATORY (INHALATION) at 10:41

## 2020-11-14 RX ADMIN — ASPIRIN 81 MG: 81 TABLET, COATED ORAL at 09:52

## 2020-11-14 RX ADMIN — ATORVASTATIN CALCIUM 80 MG: 40 TABLET, FILM COATED ORAL at 20:17

## 2020-11-14 RX ADMIN — NICOTINE 1 PATCH: 21 PATCH, EXTENDED RELEASE TRANSDERMAL at 09:55

## 2020-11-14 RX ADMIN — DOCUSATE SODIUM 100 MG: 100 CAPSULE, LIQUID FILLED ORAL at 20:17

## 2020-11-14 RX ADMIN — VENLAFAXINE HYDROCHLORIDE 75 MG: 75 CAPSULE, EXTENDED RELEASE ORAL at 09:53

## 2020-11-14 RX ADMIN — SALMETEROL XINAFOATE 1 PUFF: 50 POWDER, METERED ORAL; RESPIRATORY (INHALATION) at 20:19

## 2020-11-14 RX ADMIN — Medication 0.25 MG: at 10:41

## 2020-11-14 RX ADMIN — MEMANTINE 5 MG: 5 TABLET ORAL at 09:54

## 2020-11-14 RX ADMIN — Medication 0.25 MG: at 20:17

## 2020-11-14 RX ADMIN — POLYETHYLENE GLYCOL 3350 17 G: 17 POWDER, FOR SOLUTION ORAL at 10:01

## 2020-11-14 ASSESSMENT — ACTIVITIES OF DAILY LIVING (ADL)
ADLS_ACUITY_SCORE: 13

## 2020-11-14 NOTE — PLAN OF CARE
DATE & TIME: 11/14/2020 7-3pm  Cognitive Concerns/Orientation : Alert and oriented to self only. Cooperative with cares. Slept most of shift   BEHAVIOR & AGGRESSION TOOL COLOR: Green  CIWA SCORE: NA  ABNL VS/O2: Daily vs checks. Denies pain. LS clear   MOBILITY: IND in room; door alarm.   PAIN MANAGMENT: Denied pain.   DIET: regular. likes to snack   BOWEL/BLADDER: continent   ABNL LAB/BG: na   DRAIN/DEVICES: na   TELEMETRY RHYTHM: na   SKIN: intact.   TESTS/PROCEDURES: na   D/C DAY/GOALS/PLACE: currently under civil commitment and court hold through [facility] until 7/31/21, pending paper work for funding and placement, following.   OTHER IMPORTANT INFO: court hold, door alarm on place.

## 2020-11-14 NOTE — PROGRESS NOTES
[facility]    HOSPITALIST PROGRESS NOTE :   --------------------------------------------------    Date of Admission:  9/9/2020    Cumulative Summary: John Juárez is a 54 year old male with PMHx of schizophrenia, hx of TBI, alcohol use disorder, COPD, hyperlipidemia and hypothyroidism who was admitted on 9/9/2020 for evaluation of aggressive behavior at his group home. His medical condition remains stable, though his group home is now unwilling to take him back. Hospitalization has been prolonged while arranging for new placement.     Assessment & Plan     Neurocognitive disorder dt hx of TBI and alcohol use disorder  Schizophrenia  Had been residing in group home prior to admission.  Brought to hospital for evaluation of aggressive behaviors. Group home now unwilling to take him back. Has had numerous CODE 21s called this stay. Seen by psych, meds adjusted. Is currently under civil commitment and court hold through [facility] until 7/31/21.  Patient has been undergoing paper work for funding and placement.     -- Patient has been last evaluated by psych on September 29th , no recent change in med's   --.Patient has been stable on current meds  -- Currently patient is on Cogentin 0.25 mg twice daily, Haldol 7 mg twice daily, Namenda 5 mg daily and Effexor 75 mg p.o. daily, continue current meds   -- PRN zyprexa and haldol available for agitation . Occasionally requires PRNS .  -- Patient did have an interview on Thursday 10/22 with [facility]; paper work filled and emailed; SW following  -- Discussed with social service , patient is awaiting funding which can take few weeks   -- per social workers: Celia Layne, [phone], is the [facility] .She has requested [facility] to submit their funding request.   Once MA and [facility] services are approved patient will be able to move the the [facility]     Hyperlipidemia  -- Chronic and stable  on aspirin and statin     COPD; Not O2 dependent.  -- Stable on salmeterol     Hypothyroidism  --Chronic and stable on levothyroxine     Tobacco Use  --Using nicotine patch    Diet: Room Service  Combination Diet Regular Diet Adult; Safe Tray - NO utensils    Valentin Catheter: not present  DVT Prophylaxis: Pneumatic Compression Devices and Ambulate every shift  Code Status: Full Code    The patient's care was discussed with the Bedside Nurse and Patient.    Disposition Plan   Expected discharge: Discharge date is unclear , pending safe disposition plan , SW are following as above  , awaiting funding.  Anticipate another 1 to 2 weeks.    Ashtyn Barrientos MD, FACP  Text Page (7am - 6pm)    ----------------------------------------------------------------------------------------------------------------------    Interval History    Patient was seen in his room, he was laying in the bed.  No acute events    -Data reviewe overnightd today: I reviewed all new labs and imaging results over the last 24 hours.    I personally reviewed no images or EKG's today.    Physical Exam   Temp: 97.5  F (36.4  C) Temp src: Oral BP: 105/75 Pulse: 66   Resp: 16 SpO2: 99 % O2 Device: None (Room air)    Vitals:    09/09/20 1754 09/25/20 0632   Weight: 70 kg (154 lb 5.2 oz) 71 kg (156 lb 8 oz)     Vital Signs with Ranges  Temp:  [97.5  F (36.4  C)] 97.5  F (36.4  C)  Pulse:  [66] 66  Resp:  [16] 16  BP: (105)/(75) 105/75  SpO2:  [99 %] 99 %  I/O last 3 completed shifts:  In: 480 [P.O.:480]  Out: -     GENERAL: Sleeping  LUNGS: Nonlabored breathing no dyspnea at rest.   HEART: RRR  ABDOMEN: Nondistended, nontender  EXTREMITIES: No LE edema noted.   NEUROLOGIC: Moving all extremities  Psych: Calm    Medications       aspirin  81 mg Oral Daily     atorvastatin  80 mg Oral At Bedtime     benztropine  0.25 mg Oral BID     docusate sodium  100 mg Oral BID     haloperidol  7 mg Oral BID     levothyroxine  88 mcg Oral Daily     memantine  5 mg Oral  Daily     nicotine  1 patch Transdermal Daily     nicotine   Transdermal TID     polyethylene glycol  17 g Oral Daily     salmeterol  1 puff Inhalation BID     venlafaxine  75 mg Oral Daily with breakfast       Data   No lab results found in last 7 days.    Imaging:   No results found for this or any previous visit (from the past 24 hour(s)).

## 2020-11-14 NOTE — PLAN OF CARE
DATE & TIME: 11/14/2020 Nights   Cognitive Concerns/Orientation : Alert and oriented to self only. Cooperative with cares. Slept most of shift   BEHAVIOR & AGGRESSION TOOL COLOR: Green  CIWA SCORE: NA  ABNL VS/O2: Daily vs checks. Denies pain. LS clear   MOBILITY: IND in room; door alarm.   PAIN MANAGMENT: Denied pain.   DIET: regular. likes to snack   BOWEL/BLADDER: continent   ABNL LAB/BG: na   DRAIN/DEVICES: na   TELEMETRY RHYTHM: na   SKIN: intact.   TESTS/PROCEDURES: na   D/C DAY/GOALS/PLACE: currently under civil commitment and court hold through [facility] until 7/31/21, pending paper work for funding and placement, following.   OTHER IMPORTANT INFO:

## 2020-11-14 NOTE — PLAN OF CARE
DATE & TIME: 11/13/2020 Evenings     Cognitive Concerns/ Orientation : Alert and oriented to self only. Cooperative with cares. Slept most of shift   BEHAVIOR & AGGRESSION TOOL COLOR: Green  CIWA SCORE: NA  ABNL VS/O2: Daily vs checks. Denies pain. LS clear   MOBILITY: IND in room; door alarm.   PAIN MANAGMENT: Denied pain.   DIET: regular. likes to snack   BOWEL/BLADDER: continent   ABNL LAB/BG: na   DRAIN/DEVICES: na   TELEMETRY RHYTHM: na   SKIN: intact.   TESTS/PROCEDURES: na   D/C DAY/GOALS/PLACE: currently under civil commitment and court hold through [facility] until 7/31/21, pending paper work for funding and placement, following.   OTHER IMPORTANT INFO: court hold, door alarm on place. Nicotine patch on Lt arm.

## 2020-11-15 PROCEDURE — 250N000013 HC RX MED GY IP 250 OP 250 PS 637: Performed by: PSYCHIATRY & NEUROLOGY

## 2020-11-15 PROCEDURE — 250N000013 HC RX MED GY IP 250 OP 250 PS 637: Performed by: HOSPITALIST

## 2020-11-15 PROCEDURE — 250N000013 HC RX MED GY IP 250 OP 250 PS 637: Performed by: INTERNAL MEDICINE

## 2020-11-15 PROCEDURE — 99231 SBSQ HOSP IP/OBS SF/LOW 25: CPT | Performed by: HOSPITALIST

## 2020-11-15 PROCEDURE — 120N000001 HC R&B MED SURG/OB

## 2020-11-15 PROCEDURE — 250N000013 HC RX MED GY IP 250 OP 250 PS 637: Performed by: NURSE PRACTITIONER

## 2020-11-15 RX ADMIN — SALMETEROL XINAFOATE 1 PUFF: 50 POWDER, METERED ORAL; RESPIRATORY (INHALATION) at 08:36

## 2020-11-15 RX ADMIN — NICOTINE 1 PATCH: 21 PATCH, EXTENDED RELEASE TRANSDERMAL at 08:37

## 2020-11-15 RX ADMIN — Medication 0.25 MG: at 08:37

## 2020-11-15 RX ADMIN — DOCUSATE SODIUM 100 MG: 100 CAPSULE, LIQUID FILLED ORAL at 08:37

## 2020-11-15 RX ADMIN — Medication 0.25 MG: at 20:39

## 2020-11-15 RX ADMIN — POLYETHYLENE GLYCOL 3350 17 G: 17 POWDER, FOR SOLUTION ORAL at 08:37

## 2020-11-15 RX ADMIN — LEVOTHYROXINE SODIUM 88 MCG: 88 TABLET ORAL at 08:38

## 2020-11-15 RX ADMIN — SALMETEROL XINAFOATE 1 PUFF: 50 POWDER, METERED ORAL; RESPIRATORY (INHALATION) at 20:39

## 2020-11-15 RX ADMIN — MEMANTINE 5 MG: 5 TABLET ORAL at 08:38

## 2020-11-15 RX ADMIN — HALOPERIDOL 7 MG: 5 TABLET ORAL at 08:38

## 2020-11-15 RX ADMIN — HALOPERIDOL 7 MG: 5 TABLET ORAL at 20:39

## 2020-11-15 RX ADMIN — VENLAFAXINE HYDROCHLORIDE 75 MG: 75 CAPSULE, EXTENDED RELEASE ORAL at 08:38

## 2020-11-15 RX ADMIN — DOCUSATE SODIUM 100 MG: 100 CAPSULE, LIQUID FILLED ORAL at 20:39

## 2020-11-15 RX ADMIN — ASPIRIN 81 MG: 81 TABLET, COATED ORAL at 08:37

## 2020-11-15 RX ADMIN — ATORVASTATIN CALCIUM 80 MG: 40 TABLET, FILM COATED ORAL at 20:39

## 2020-11-15 ASSESSMENT — ACTIVITIES OF DAILY LIVING (ADL)
ADLS_ACUITY_SCORE: 13

## 2020-11-15 NOTE — PROGRESS NOTES
[facility]    HOSPITALIST PROGRESS NOTE :   --------------------------------------------------    Date of Admission:  9/9/2020    Cumulative Summary: John Juárez is a 54 year old male with PMHx of schizophrenia, hx of TBI, alcohol use disorder, COPD, hyperlipidemia and hypothyroidism who was admitted on 9/9/2020 for evaluation of aggressive behavior at his group home. His medical condition remains stable, though his group home is now unwilling to take him back. Hospitalization has been prolonged while arranging for new placement.     Assessment & Plan     Neurocognitive disorder dt hx of TBI and alcohol use disorder  Schizophrenia  Had been residing in group home prior to admission.  Brought to hospital for evaluation of aggressive behaviors. Group home now unwilling to take him back. Has had numerous CODE 21s called this stay. Seen by psych, meds adjusted. Is currently under civil commitment and court hold through [facility] until 7/31/21.  Patient has been undergoing paper work for funding and placement.     -- Patient has been last evaluated by psych on September 29th , no recent change in med's   --.Patient has been stable on current meds  -- Currently patient is on Cogentin 0.25 mg twice daily, Haldol 7 mg twice daily, Namenda 5 mg daily and Effexor 75 mg p.o. daily, continue current meds   -- PRN zyprexa and haldol available for agitation . Occasionally requires PRNS .  -- Patient did have an interview on Thursday 10/22 with [facility]; paper work filled and emailed; SW following  -- Discussed with social service , patient is awaiting funding which can take few weeks   -- per social workers: Celia Layne, [phone], is the [employer] .She has requested [facility] to submit their funding request.   Once MA and [facility] services are approved patient will be able to move the the [facility]     Hyperlipidemia  -- Chronic and stable  on aspirin and statin     COPD; Not O2 dependent.  -- Stable on salmeterol     Hypothyroidism  --Chronic and stable on levothyroxine     Tobacco Use  --Using nicotine patch    Diet: Room Service  Combination Diet Regular Diet Adult; Safe Tray - NO utensils    Valentin Catheter: not present  DVT Prophylaxis: Pneumatic Compression Devices and Ambulate every shift  Code Status: Full Code    The patient's care was discussed with the Bedside Nurse and Patient.    Disposition Plan   Expected discharge: Discharge date is unclear , pending safe disposition plan , SW are following as above  , awaiting funding.  Anticipate another 1 to 2 weeks.    Ashtyn Barrientos MD, FACP  Text Page (7am - 6pm)    ----------------------------------------------------------------------------------------------------------------------    Interval History    Patient was seen in his room, he was laying in the bed.  No acute events overnight.    -Data reviewed today: I reviewed all new labs and imaging results over the last 24 hours.    I personally reviewed no images or EKG's today.    Physical Exam   Temp: 97.6  F (36.4  C) Temp src: Oral BP: 104/74 Pulse: 81   Resp: 16 SpO2: 98 % O2 Device: None (Room air)    Vitals:    09/09/20 1754 09/25/20 0632   Weight: 70 kg (154 lb 5.2 oz) 71 kg (156 lb 8 oz)     Vital Signs with Ranges  Temp:  [97.6  F (36.4  C)] 97.6  F (36.4  C)  Pulse:  [81] 81  Resp:  [16] 16  BP: (104)/(74) 104/74  SpO2:  [98 %] 98 %  I/O last 3 completed shifts:  In: 420 [P.O.:420]  Out: -     GENERAL: Sleeping/resting in bed  LUNGS: Nonlabored breathing no dyspnea at rest.   HEART: RRR  ABDOMEN: Nondistended, nontender  EXTREMITIES: No LE edema noted.   NEUROLOGIC: Moving all extremities  Psych: Calm    Medications       aspirin  81 mg Oral Daily     atorvastatin  80 mg Oral At Bedtime     benztropine  0.25 mg Oral BID     docusate sodium  100 mg Oral BID     haloperidol  7 mg Oral BID     levothyroxine  88 mcg Oral Daily     memantine   5 mg Oral Daily     nicotine  1 patch Transdermal Daily     nicotine   Transdermal TID     polyethylene glycol  17 g Oral Daily     salmeterol  1 puff Inhalation BID     venlafaxine  75 mg Oral Daily with breakfast       Data   No lab results found in last 7 days.    Imaging:   No results found for this or any previous visit (from the past 24 hour(s)).

## 2020-11-15 NOTE — PLAN OF CARE
DATE & TIME: 11/15/2020 7-3  Cognitive Concerns/Orientation : Alert and oriented to self only. Cooperative with cares. Slept most of shift   BEHAVIOR & AGGRESSION TOOL COLOR: Green  CIWA SCORE: N/A  ABNL VS/O2: Daily vs checks. Denies pain. LS clear   MOBILITY: IND in room; door alarm.   PAIN MANAGMENT: Denied pain.   DIET: Regular. likes to snack   BOWEL/BLADDER: Continent   ABNL LAB/BG: N/A  DRAIN/DEVICES: N/A  TELEMETRY RHYTHM: N/A  SKIN: Intact  TESTS/PROCEDURES: N/A  D/C DAY/GOALS/PLACE: Currently under civil commitment and court hold through [facility] until 7/31/21, pending paper work for funding and placement, following.   OTHER IMPORTANT INFO: Court hold, door alarm on place.

## 2020-11-15 NOTE — PLAN OF CARE
Cognitive Concerns/Orientation : Alert and oriented to self only. Cooperative with cares. Slept most of shift   BEHAVIOR & AGGRESSION TOOL COLOR: Green  CIWA SCORE: N/A  ABNL VS/O2: Daily vs checks. Denies pain. LS clear   MOBILITY: IND in room; door alarm.   PAIN MANAGMENT: Denied pain.   DIET: Regular. likes to snack   BOWEL/BLADDER: Continent   ABNL LAB/BG: N/A  DRAIN/DEVICES: N/A  TELEMETRY RHYTHM: N/A  SKIN: Intact  TESTS/PROCEDURES: N/A  D/C DAY/GOALS/PLACE: Currently under civil commitment and court hold through [facility] until 7/31/21, pending paper work for funding and placement, following.   OTHER IMPORTANT INFO: Court hold, door alarm on place.

## 2020-11-16 PROCEDURE — 250N000013 HC RX MED GY IP 250 OP 250 PS 637: Performed by: INTERNAL MEDICINE

## 2020-11-16 PROCEDURE — 250N000013 HC RX MED GY IP 250 OP 250 PS 637: Performed by: PSYCHIATRY & NEUROLOGY

## 2020-11-16 PROCEDURE — 120N000001 HC R&B MED SURG/OB

## 2020-11-16 PROCEDURE — 250N000013 HC RX MED GY IP 250 OP 250 PS 637: Performed by: HOSPITALIST

## 2020-11-16 PROCEDURE — 250N000013 HC RX MED GY IP 250 OP 250 PS 637: Performed by: NURSE PRACTITIONER

## 2020-11-16 PROCEDURE — 99231 SBSQ HOSP IP/OBS SF/LOW 25: CPT | Performed by: HOSPITALIST

## 2020-11-16 RX ADMIN — ATORVASTATIN CALCIUM 80 MG: 40 TABLET, FILM COATED ORAL at 20:04

## 2020-11-16 RX ADMIN — SALMETEROL XINAFOATE 1 PUFF: 50 POWDER, METERED ORAL; RESPIRATORY (INHALATION) at 08:55

## 2020-11-16 RX ADMIN — Medication 0.25 MG: at 20:04

## 2020-11-16 RX ADMIN — HALOPERIDOL 7 MG: 5 TABLET ORAL at 08:48

## 2020-11-16 RX ADMIN — POLYETHYLENE GLYCOL 3350 17 G: 17 POWDER, FOR SOLUTION ORAL at 08:53

## 2020-11-16 RX ADMIN — DOCUSATE SODIUM 100 MG: 100 CAPSULE, LIQUID FILLED ORAL at 20:04

## 2020-11-16 RX ADMIN — ASPIRIN 81 MG: 81 TABLET, COATED ORAL at 08:48

## 2020-11-16 RX ADMIN — VENLAFAXINE HYDROCHLORIDE 75 MG: 75 CAPSULE, EXTENDED RELEASE ORAL at 08:47

## 2020-11-16 RX ADMIN — HALOPERIDOL 7 MG: 5 TABLET ORAL at 20:04

## 2020-11-16 RX ADMIN — Medication 0.25 MG: at 08:47

## 2020-11-16 RX ADMIN — LEVOTHYROXINE SODIUM 88 MCG: 88 TABLET ORAL at 08:47

## 2020-11-16 RX ADMIN — DOCUSATE SODIUM 100 MG: 100 CAPSULE, LIQUID FILLED ORAL at 08:47

## 2020-11-16 RX ADMIN — NICOTINE 1 PATCH: 21 PATCH, EXTENDED RELEASE TRANSDERMAL at 08:53

## 2020-11-16 RX ADMIN — MEMANTINE 5 MG: 5 TABLET ORAL at 08:47

## 2020-11-16 RX ADMIN — SALMETEROL XINAFOATE 1 PUFF: 50 POWDER, METERED ORAL; RESPIRATORY (INHALATION) at 20:04

## 2020-11-16 ASSESSMENT — ACTIVITIES OF DAILY LIVING (ADL)
ADLS_ACUITY_SCORE: 13

## 2020-11-16 NOTE — PLAN OF CARE
DATE & TIME: 11/16/20 6777-9748  Cognitive Concerns/Orientation : Alert and oriented to self only. Calm and cooperative.   BEHAVIOR & AGGRESSION TOOL COLOR: Green  ABNL VS/O2: Daily vs checks.   MOBILITY: IND in room; door alarm.   PAIN MANAGMENT: Denied pain.   DIET: Regular. likes to snack   BOWEL/BLADDER: Continent   SKIN: Intact  D/C DAY/GOALS/PLACE: Currently under civil commitment and court hold through [facility] until 7/31/21, pending paper work for funding and placement;  following.   OTHER IMPORTANT INFO: Court hold, door alarm in place.

## 2020-11-16 NOTE — PLAN OF CARE
DATE & TIME: 11/15/2020 7058-5821  Cognitive Concerns/Orientation : Alert and oriented to self only. Cooperative with cares.   BEHAVIOR & AGGRESSION TOOL COLOR: Green  ABNL VS/O2: Daily vs checks.   MOBILITY: IND in room; door alarm.   PAIN MANAGMENT: Denied pain.   DIET: Regular. likes to snack   BOWEL/BLADDER: Continent   SKIN: Intact  D/C DAY/GOALS/PLACE: Currently under civil commitment and court hold through [facility] until 7/31/21, pending paper work for funding and placement, following.   OTHER IMPORTANT INFO: Court hold, door alarm on place.       THIS IS A CASE OF A 61 YO MALE  EVALUATED IN THE OFFICE DUE TO BILATERAL  HIP PAIN. PATIENT SCHEDULED FOR A STAGED BILATERAL TOTAL HIP REPLACEMENT     PAST MEDICAL HISTORY: HYPERLIPIDEMIA. OA     HOSPITAL COURSE: AFTER THE RISK AND BENEFITS OF SURGICAL INTERVENTION IN DETAILS WERE DISCUSSED WITH THE PATIENT, A CONSENT WAS OBTAINED. AFTER OBTAINING MEDICAL CLEARANCE AND PREOPERATIVE EVALUATION, THE PATIENT WAS TAKEN TO THE OPERATING ROOM ON 06/22/2017 AND THE PATIENT UNDERWENT A  LEFT TOTAL HIP REPLACEMENT. POSTOPERATIVE PHASE, THE PATIENT WAS ANTICOAGULATED  WITH LOVENOX AND WAS GIVEN 24 HOURS OF IV ANTIBIOTICS. A SOCIAL SERVICE CONSULT WAS OBTAINED FOR DISCHARGE PLANNING.  A PHYSICAL THERAPY CONSULT WAS OBTAINED FOR WEIGHT BEARING AS TOLERATED, HIP PRECAUTIONS.  DUE TO ANEMIA OF ACUTE BLOOD LOSS POST OP IRON SUPPLEMENT GIVEN.    AFTER THE RISK AND BENEFITS OF SURGICAL INTERVENTION IN DETAILS WERE DISCUSSED WITH THE PATIENT, A CONSENT WAS OBTAINED. AFTER  A FOLLOW UP  MEDICAL CLEARANCE AND PREOPERATIVE LABS AND RIGHT HIP X-RAY, THE PATIENT WAS TAKEN TO THE OPERATING ROOM ON 06/26/2017 AND THE PATIENT UNDERWENT A  RIGHT  TOTAL HIP REPLACEMENT. POSTOPERATIVE PHASE, THE PATIENT WAS ANTICOAGULATED  WITH ASPIRIN 325 MG PO BID,  AND WAS GIVEN 24 HOURS OF IV ANTIBIOTICS VANCOMYCIN. A SOCIAL SERVICE CONSULT WAS OBTAINED FOR DISCHARGE PLANNING FOR HOME WITH HOME CARE. A PHYSICAL THERAPY CONSULT WAS OBTAINED FOR WEIGHT BEARING AS TOLERATED, HIP PRECAUTIONS.  DUE TO ANEMIA OF ACUTE BLOOD LOSS POST OP IRON SUPPLEMENT GIVEN.    DUE TO HYPOTENSION , WAS GIVEN IV FLUIDS, HEMOGLOBIN AND HEMATOCRIT REMAINED STABLE.    DISPOSITION : HOME WITH HOME CARE  AND FOLLOW UP WITH DR. FLOEWRS AS OUTPATIENT.

## 2020-11-16 NOTE — PROGRESS NOTES
[facility]    Medicine Progress Note - Hospitalist Service       Date of Admission:  9/9/2020  Assessment & Plan       John Juárez is a 54 year old male admitted on 9/9/2020.  PMHx of schizophrenia, hx of TBI, alcohol use disorder, COPD, hyperlipidemia and hypothyroidism who was admitted on 9/9/2020 for evaluation of aggressive behavior at his group home. His medical condition remains stable, though his group home is now unwilling to take him back. Hospitalization has been prolonged while arranging for new placement.           Neurocognitive disorder dt hx of TBI and alcohol use disorder  Schizophrenia  Had been residing in group home prior to admission.  Brought to hospital for evaluation of aggressive behaviors. Group home now unwilling to take him back. Has had numerous CODE 21s called this stay. Seen by psych, meds adjusted. Is currently under civil commitment and court hold through [facility] until 7/31/21.  Patient has been undergoing paper work for funding and placement.     -- Patient has been last evaluated by psych on September 29th , no recent change in med's   --.Patient has been stable on current meds  -- Currently patient is on Cogentin 0.25 mg twice daily, Haldol 7 mg twice daily, Namenda 5 mg daily and Effexor 75 mg p.o. daily, continue current meds   -- PRN zyprexa and haldol available for agitation . Occasionally requires PRNS .  -- Discussed with social service , patient is awaiting funding which can take few weeks   -- per social workers: Celia Layne, [phone], [facility] the [facility] .She has requested [facility] to submit their funding request.   Once MA and [facility] services are approved patient will be able to move the the [facility]     Hyperlipidemia  -- Chronic and stable on aspirin and statin     COPD; Not O2 dependent.  -- Stable on salmeterol     Hypothyroidism  --Chronic and stable on levothyroxine     Tobacco  Use  --Using nicotine patch       Diet: Room Service  Combination Diet Regular Diet Adult; Safe Tray - NO utensils    DVT Prophylaxis: Pneumatic Compression Devices  Valentin Catheter: not present  Code Status: Full Code           Disposition Plan   Expected discharge: Discharge date is unclear, pending safe disposition plan , SW are following as above, awaiting funding.  Anticipate another 1 to 2 weeks.  Entered: Sushil Genao MD 11/16/2020, 4:13 PM       The patient's care was discussed with the Bedside Nurse, Care Coordinator/ and Patient.    Sushil Genao MD  Hospitalist Service  [facility]  Contact information available via [facility] Paging/Directory    ______________________________________________________________________    Interval History   He is lying comfortably in bed and denies any pain, reports appetite is good.      No concerns per RN.     Data reviewed today: I reviewed all medications, new labs and imaging results over the last 24 hours. I personally reviewed no images or EKG's today.    Physical Exam   Vital Signs: Temp: 97.6  F (36.4  C) Temp src: Oral BP: 108/55 Pulse: 76   Resp: 16 SpO2: 96 % O2 Device: None (Room air)    Weight: 156 lbs 8 oz  General Appearance: Thin male in NAD  Respiratory: lungs CTAB, no wheezes or crackles, no tachypnea  Cardiovascular: RRR, normal s1/s2 without murmur  GI: normal bowel sounds  Skin:   Other: Cranial nerves grossly intact     Data   Medications       aspirin  81 mg Oral Daily     atorvastatin  80 mg Oral At Bedtime     benztropine  0.25 mg Oral BID     docusate sodium  100 mg Oral BID     haloperidol  7 mg Oral BID     levothyroxine  88 mcg Oral Daily     memantine  5 mg Oral Daily     nicotine  1 patch Transdermal Daily     nicotine   Transdermal TID     polyethylene glycol  17 g Oral Daily     salmeterol  1 puff Inhalation BID     venlafaxine  75 mg Oral Daily with breakfast

## 2020-11-17 PROCEDURE — 250N000013 HC RX MED GY IP 250 OP 250 PS 637: Performed by: HOSPITALIST

## 2020-11-17 PROCEDURE — 250N000013 HC RX MED GY IP 250 OP 250 PS 637: Performed by: INTERNAL MEDICINE

## 2020-11-17 PROCEDURE — 120N000001 HC R&B MED SURG/OB

## 2020-11-17 PROCEDURE — 250N000013 HC RX MED GY IP 250 OP 250 PS 637: Performed by: PSYCHIATRY & NEUROLOGY

## 2020-11-17 PROCEDURE — 99231 SBSQ HOSP IP/OBS SF/LOW 25: CPT | Performed by: HOSPITALIST

## 2020-11-17 RX ADMIN — LEVOTHYROXINE SODIUM 88 MCG: 88 TABLET ORAL at 08:56

## 2020-11-17 RX ADMIN — VENLAFAXINE HYDROCHLORIDE 75 MG: 75 CAPSULE, EXTENDED RELEASE ORAL at 08:56

## 2020-11-17 RX ADMIN — SALMETEROL XINAFOATE 1 PUFF: 50 POWDER, METERED ORAL; RESPIRATORY (INHALATION) at 21:33

## 2020-11-17 RX ADMIN — SALMETEROL XINAFOATE 1 PUFF: 50 POWDER, METERED ORAL; RESPIRATORY (INHALATION) at 08:55

## 2020-11-17 RX ADMIN — HALOPERIDOL 7 MG: 5 TABLET ORAL at 08:56

## 2020-11-17 RX ADMIN — Medication 0.25 MG: at 08:56

## 2020-11-17 RX ADMIN — ATORVASTATIN CALCIUM 80 MG: 40 TABLET, FILM COATED ORAL at 21:30

## 2020-11-17 RX ADMIN — DOCUSATE SODIUM 100 MG: 100 CAPSULE, LIQUID FILLED ORAL at 21:30

## 2020-11-17 RX ADMIN — MEMANTINE 5 MG: 5 TABLET ORAL at 08:56

## 2020-11-17 RX ADMIN — POLYETHYLENE GLYCOL 3350 17 G: 17 POWDER, FOR SOLUTION ORAL at 08:56

## 2020-11-17 RX ADMIN — HALOPERIDOL 7 MG: 5 TABLET ORAL at 21:30

## 2020-11-17 RX ADMIN — ASPIRIN 81 MG: 81 TABLET, COATED ORAL at 08:56

## 2020-11-17 RX ADMIN — Medication 0.25 MG: at 21:30

## 2020-11-17 RX ADMIN — DOCUSATE SODIUM 100 MG: 100 CAPSULE, LIQUID FILLED ORAL at 08:56

## 2020-11-17 ASSESSMENT — ACTIVITIES OF DAILY LIVING (ADL)
ADLS_ACUITY_SCORE: 13

## 2020-11-17 NOTE — PROGRESS NOTES
[facility]    Medicine Progress Note - Hospitalist Service       Date of Admission:  9/9/2020  Assessment & Plan       John Juárez is a 54 year old male admitted on 9/9/2020.  PMHx of schizophrenia, hx of TBI, alcohol use disorder, COPD, hyperlipidemia and hypothyroidism who was admitted on 9/9/2020 for evaluation of aggressive behavior at his group home. His medical condition remains stable, though his group home is now unwilling to take him back. Hospitalization has been prolonged while arranging for new placement.           Neurocognitive disorder dt hx of TBI and alcohol use disorder  Schizophrenia  Had been residing in group home prior to admission.  Brought to hospital for evaluation of aggressive behaviors. Group home now unwilling to take him back. Has had numerous CODE 21s called this stay. Seen by psych, meds adjusted. Is currently under civil commitment and court hold through [facility] until 7/31/21.  Patient has been undergoing paper work for funding and placement.     -- Patient has been last evaluated by psych on September 29th , no recent change in med's   --.Patient has been stable on current meds  -- Currently patient is on Cogentin 0.25 mg twice daily, Haldol 7 mg twice daily, Namenda 5 mg daily and Effexor 75 mg p.o. daily, continue current meds   -- PRN zyprexa and haldol available for agitation . Occasionally requires PRNS .  -- Discussed with social service , patient is awaiting funding which can take few weeks   -- per social workers: Celia Layne, [phone], is the [facility] .She has requested [facility] to submit their funding request.   Once MA and [facility] services are approved patient will be able to move the the [facility]     Hyperlipidemia  -- Chronic and stable on aspirin and statin     COPD; Not O2 dependent.  -- Stable on salmeterol     Hypothyroidism  --Chronic and stable on levothyroxine     Tobacco  Use  --Using nicotine patch       Diet: Room Service  Combination Diet Regular Diet Adult; Safe Tray - NO utensils    DVT Prophylaxis: Pneumatic Compression Devices  Valentin Catheter: not present  Code Status: Full Code           Disposition Plan   Expected discharge: Discharge date is unclear, pending safe disposition plan , SW are following as above, awaiting funding.  Anticipate another 1 to 2 weeks.  Entered: Sushil Genao MD 11/17/2020, 3:23 PM       The patient's care was discussed with the Bedside Nurse and Patient.    Sushil Genao MD  Hospitalist Service  [facility]  Contact information available via [facility] Paging/Directory    ______________________________________________________________________    Interval History   No concerns per RN.  Patient offers no complaints, is tolerating diet well.      Data reviewed today: I reviewed all medications, new labs and imaging results over the last 24 hours. I personally reviewed no images or EKG's today.    Physical Exam   Vital Signs: Temp: 97.1  F (36.2  C) Temp src: Oral BP: 99/68 Pulse: 70   Resp: 16 SpO2: 97 % O2 Device: None (Room air)    Weight: 156 lbs 8 oz  General Appearance: Thin male in NAD  Respiratory: respirations non-labored on room air  Cardiovascular:   GI:   Skin:   Other: Cranial nerves grossly intact     Data   Medications       aspirin  81 mg Oral Daily     atorvastatin  80 mg Oral At Bedtime     benztropine  0.25 mg Oral BID     docusate sodium  100 mg Oral BID     haloperidol  7 mg Oral BID     levothyroxine  88 mcg Oral Daily     memantine  5 mg Oral Daily     nicotine  1 patch Transdermal Daily     nicotine   Transdermal TID     polyethylene glycol  17 g Oral Daily     salmeterol  1 puff Inhalation BID     venlafaxine  75 mg Oral Daily with breakfast

## 2020-11-17 NOTE — PLAN OF CARE
DATE & TIME: 11/16/20 4511-0939  Cognitive Concerns/Orientation : Alert and oriented to self only. Calm and cooperative.   BEHAVIOR & AGGRESSION TOOL COLOR: Green  ABNL VS/O2: Daily VS checks. WDL today.  MOBILITY: IND in room; door alarm.   PAIN MANAGMENT: Denied pain.   DIET: Regular. likes to snack   BOWEL/BLADDER: Continent   SKIN: Intact  D/C DAY/GOALS/PLACE: Currently under civil commitment and court hold through [facility] until 7/31/21, pending paper work for funding and placement;  following.   OTHER IMPORTANT INFO: Court hold, door alarm in place.

## 2020-11-17 NOTE — PLAN OF CARE
DATE & TIME: 11/17/20 7796-9123           Cognitive Concerns/ Orientation : Alert to self  BEHAVIOR & AGGRESSION TOOL COLOR: Green  ABNL VS/O2: VSS on RA, soft BP 99/70  MOBILITY: Independent in room. Door alarm active  PAIN MANAGMENT: Denies  DIET: Regular, safe tray-no utensils  BOWEL/BLADDER: Up to bathroom  ABNL LAB/BG: No labs   DRAIN/DEVICES: No IV access  SKIN: Bruised, scars  D/C DAY/GOALS/PLACE: Pending placement and funding, to [facility]. Civil commitment/court hold through [facility] valid until 07/31/2021. Social work following. Refused Nicotine patch.

## 2020-11-17 NOTE — PROGRESS NOTES
DATE & TIME: 11/16/2020 Night    Cognitive Concerns/ Orientation : Alert/oriented to self only   BEHAVIOR & AGGRESSION TOOL COLOR: Green  ABNL VS/O2: Once daily vital signs, room air  MOBILITY: Independent in room. Door alarm active  PAIN MANAGMENT: Denies/Absence on non-verbal indicators of pain  DIET: Regular, safe tray-no utensils  BOWEL/BLADDER: Up to bathroom  ABNL LAB/BG: No labs since 11/2  DRAIN/DEVICES: No IV access  SKIN: WNL  D/C DAY/GOALS/PLACE: Pending placement and funding, to [facility]. Civil commitment/court hold through [facility] valid until 07/31/2021. Social work following  OTHER IMPORTANT INFO: Pt slept through night.  COMMIT TO SIT DONE AND SIGNED OFF Pt confused

## 2020-11-17 NOTE — PROGRESS NOTES
[id number]    Pt A&O to self only. VSS on RA. Up  Independently in room. Denies pain. Voiding independently. Door alarm on and in place. Discharge pending placement. Continue to monitor.

## 2020-11-18 PROCEDURE — 250N000013 HC RX MED GY IP 250 OP 250 PS 637: Performed by: INTERNAL MEDICINE

## 2020-11-18 PROCEDURE — 250N000013 HC RX MED GY IP 250 OP 250 PS 637: Performed by: PSYCHIATRY & NEUROLOGY

## 2020-11-18 PROCEDURE — 250N000013 HC RX MED GY IP 250 OP 250 PS 637: Performed by: HOSPITALIST

## 2020-11-18 PROCEDURE — 99231 SBSQ HOSP IP/OBS SF/LOW 25: CPT | Performed by: HOSPITALIST

## 2020-11-18 PROCEDURE — 120N000001 HC R&B MED SURG/OB

## 2020-11-18 RX ADMIN — VENLAFAXINE HYDROCHLORIDE 75 MG: 75 CAPSULE, EXTENDED RELEASE ORAL at 08:47

## 2020-11-18 RX ADMIN — HALOPERIDOL 7 MG: 5 TABLET ORAL at 20:08

## 2020-11-18 RX ADMIN — MEMANTINE 5 MG: 5 TABLET ORAL at 08:47

## 2020-11-18 RX ADMIN — Medication 0.25 MG: at 20:08

## 2020-11-18 RX ADMIN — DOCUSATE SODIUM 100 MG: 100 CAPSULE, LIQUID FILLED ORAL at 20:08

## 2020-11-18 RX ADMIN — SALMETEROL XINAFOATE 1 PUFF: 50 POWDER, METERED ORAL; RESPIRATORY (INHALATION) at 08:51

## 2020-11-18 RX ADMIN — ASPIRIN 81 MG: 81 TABLET, COATED ORAL at 08:47

## 2020-11-18 RX ADMIN — ATORVASTATIN CALCIUM 80 MG: 40 TABLET, FILM COATED ORAL at 20:08

## 2020-11-18 RX ADMIN — LEVOTHYROXINE SODIUM 88 MCG: 88 TABLET ORAL at 08:47

## 2020-11-18 RX ADMIN — HALOPERIDOL 7 MG: 5 TABLET ORAL at 08:47

## 2020-11-18 RX ADMIN — DOCUSATE SODIUM 100 MG: 100 CAPSULE, LIQUID FILLED ORAL at 08:46

## 2020-11-18 RX ADMIN — SALMETEROL XINAFOATE 1 PUFF: 50 POWDER, METERED ORAL; RESPIRATORY (INHALATION) at 20:08

## 2020-11-18 RX ADMIN — OLANZAPINE 10 MG: 5 TABLET, ORALLY DISINTEGRATING ORAL at 15:57

## 2020-11-18 RX ADMIN — POLYETHYLENE GLYCOL 3350 17 G: 17 POWDER, FOR SOLUTION ORAL at 08:46

## 2020-11-18 RX ADMIN — Medication 0.25 MG: at 08:47

## 2020-11-18 ASSESSMENT — ACTIVITIES OF DAILY LIVING (ADL)
ADLS_ACUITY_SCORE: 13

## 2020-11-18 NOTE — PLAN OF CARE
Cognitive Concerns/ Orientation : Alert to self  BEHAVIOR & AGGRESSION TOOL COLOR: Green  ABNL VS/O2: VSS on room air   MOBILITY: Independent in room. Door alarm active  PAIN MANAGMENT: Denies  DIET: Regular, safe tray-no utensils  BOWEL/BLADDER: Up to bathroom independent   ABNL LAB/BG: No labs   DRAIN/DEVICES: No IV access  SKIN: Bruised, scars  D/C DAY/GOALS/PLACE: Pending placement and funding, to [facility]. Civil commitment/court hold through [facility] valid until 07/31/2021. Social work following. Refused Nicotine patch.

## 2020-11-18 NOTE — PLAN OF CARE
DATE & TIME: 11/18/20 Day shift  Cognitive Concerns/ Orientation : Alert to self, place  BEHAVIOR & AGGRESSION TOOL COLOR: Green  ABNL VS/O2: VSS on room air   MOBILITY: Independent in room; walked around unite. Door alarm active  PAIN MANAGMENT: Denies  DIET: Regular, safe tray-no utensils  BOWEL/BLADDER: Up to bathroom independent   ABNL LAB/BG: No labs   DRAIN/DEVICES: No IV access  SKIN: Bruised, chest scars  D/C DAY/GOALS/PLACE: Pending placement and funding, to [facility]. Civil commitment/court hold through [facility] valid until 07/31/2021. Social work following. Refused Nicotine patch.

## 2020-11-18 NOTE — PROGRESS NOTES
[facility]    Medicine Progress Note - Hospitalist Service       Date of Admission:  9/9/2020  Assessment & Plan       John Juárez is a 54 year old male admitted on 9/9/2020.  PMHx of schizophrenia, hx of TBI, alcohol use disorder, COPD, hyperlipidemia and hypothyroidism who was admitted on 9/9/2020 for evaluation of aggressive behavior at his group home. His medical condition remains stable, though his group home is now unwilling to take him back. Hospitalization has been prolonged while arranging for new placement.           Neurocognitive disorder dt hx of TBI and alcohol use disorder  Schizophrenia  Had been residing in group home prior to admission.  Brought to hospital for evaluation of aggressive behaviors. Group home now unwilling to take him back. Has had numerous CODE 21s called this stay. Seen by psych, meds adjusted. Is currently under civil commitment and court hold through [facility] until 7/31/21.  Patient has been undergoing paper work for funding and placement.     -- Patient has been last evaluated by psych on September 29th , no recent change in med's   --.Patient has been stable on current meds  -- Currently patient is on Cogentin 0.25 mg twice daily, Haldol 7 mg twice daily, Namenda 5 mg daily and Effexor 75 mg p.o. daily, continue current meds   -- PRN zyprexa and haldol available for agitation . Occasionally requires PRNS .  -- Discussed with social service , patient is awaiting funding which can take few weeks   -- per social workers: Celia Layne, [phone], is the [facility] .She has requested [facility] to submit their funding request.   Once MA and [facility] services are approved patient will be able to move the the [facility]     Hyperlipidemia  -- Chronic and stable on aspirin and statin     COPD; Not O2 dependent.  -- Stable on salmeterol     Hypothyroidism  --Chronic and stable on levothyroxine     Tobacco  Use  --Using nicotine patch       Diet: Room Service  Combination Diet Regular Diet Adult; Safe Tray - NO utensils    DVT Prophylaxis: Pneumatic Compression Devices  Valentin Catheter: not present  Code Status: Full Code           Disposition Plan   Expected discharge: Discharge date is unclear, pending safe disposition plan , SW are following as above, awaiting funding.  Anticipate another 1 to 2 weeks.  Entered: Sushil Genao MD 11/18/2020, 3:52 PM       The patient's care was discussed with the Bedside Nurse and Patient.    Sushil Genao MD  Hospitalist Service  [facility]  Contact information available via [facility] Paging/Directory    ______________________________________________________________________    Interval History   Seen to be sleeping comfortably in bed.    No acute events or concerns per RN.      Data reviewed today: I reviewed all medications, new labs and imaging results over the last 24 hours. I personally reviewed no images or EKG's today.    Physical Exam   Vital Signs: Temp: 97.7  F (36.5  C) Temp src: Oral BP: 103/69 Pulse: 69   Resp: 16 SpO2: 90 % O2 Device: None (Room air)    Weight: 156 lbs 8 oz  General Appearance: Thin male in NAD, sleeping in bed  Respiratory: respirations non-labored on room air  Cardiovascular:   GI:   Skin:   Other: Cranial nerves grossly intact     Data   Medications       aspirin  81 mg Oral Daily     atorvastatin  80 mg Oral At Bedtime     benztropine  0.25 mg Oral BID     docusate sodium  100 mg Oral BID     haloperidol  7 mg Oral BID     levothyroxine  88 mcg Oral Daily     memantine  5 mg Oral Daily     nicotine  1 patch Transdermal Daily     nicotine   Transdermal TID     polyethylene glycol  17 g Oral Daily     salmeterol  1 puff Inhalation BID     venlafaxine  75 mg Oral Daily with breakfast

## 2020-11-19 PROCEDURE — 250N000013 HC RX MED GY IP 250 OP 250 PS 637: Performed by: HOSPITALIST

## 2020-11-19 PROCEDURE — 250N000013 HC RX MED GY IP 250 OP 250 PS 637: Performed by: NURSE PRACTITIONER

## 2020-11-19 PROCEDURE — 120N000001 HC R&B MED SURG/OB

## 2020-11-19 PROCEDURE — 250N000013 HC RX MED GY IP 250 OP 250 PS 637: Performed by: INTERNAL MEDICINE

## 2020-11-19 PROCEDURE — 250N000013 HC RX MED GY IP 250 OP 250 PS 637: Performed by: PSYCHIATRY & NEUROLOGY

## 2020-11-19 PROCEDURE — 99231 SBSQ HOSP IP/OBS SF/LOW 25: CPT | Performed by: HOSPITALIST

## 2020-11-19 RX ADMIN — DOCUSATE SODIUM 100 MG: 100 CAPSULE, LIQUID FILLED ORAL at 20:44

## 2020-11-19 RX ADMIN — HALOPERIDOL 7 MG: 5 TABLET ORAL at 20:44

## 2020-11-19 RX ADMIN — POLYETHYLENE GLYCOL 3350 17 G: 17 POWDER, FOR SOLUTION ORAL at 10:14

## 2020-11-19 RX ADMIN — DOCUSATE SODIUM 100 MG: 100 CAPSULE, LIQUID FILLED ORAL at 10:11

## 2020-11-19 RX ADMIN — MEMANTINE 5 MG: 5 TABLET ORAL at 10:11

## 2020-11-19 RX ADMIN — ASPIRIN 81 MG: 81 TABLET, COATED ORAL at 10:10

## 2020-11-19 RX ADMIN — NICOTINE 1 PATCH: 21 PATCH, EXTENDED RELEASE TRANSDERMAL at 10:14

## 2020-11-19 RX ADMIN — SALMETEROL XINAFOATE 1 PUFF: 50 POWDER, METERED ORAL; RESPIRATORY (INHALATION) at 10:10

## 2020-11-19 RX ADMIN — SALMETEROL XINAFOATE 1 PUFF: 50 POWDER, METERED ORAL; RESPIRATORY (INHALATION) at 20:45

## 2020-11-19 RX ADMIN — NICOTINE POLACRILEX 2 MG: 2 GUM, CHEWING ORAL at 17:12

## 2020-11-19 RX ADMIN — LEVOTHYROXINE SODIUM 88 MCG: 88 TABLET ORAL at 10:19

## 2020-11-19 RX ADMIN — VENLAFAXINE HYDROCHLORIDE 75 MG: 75 CAPSULE, EXTENDED RELEASE ORAL at 10:13

## 2020-11-19 RX ADMIN — Medication 0.25 MG: at 10:12

## 2020-11-19 RX ADMIN — HALOPERIDOL 7 MG: 5 TABLET ORAL at 10:12

## 2020-11-19 RX ADMIN — Medication 0.25 MG: at 20:44

## 2020-11-19 RX ADMIN — NICOTINE POLACRILEX 2 MG: 2 GUM, CHEWING ORAL at 20:44

## 2020-11-19 RX ADMIN — ATORVASTATIN CALCIUM 80 MG: 40 TABLET, FILM COATED ORAL at 20:44

## 2020-11-19 ASSESSMENT — ACTIVITIES OF DAILY LIVING (ADL)
ADLS_ACUITY_SCORE: 13

## 2020-11-19 NOTE — PROGRESS NOTES
Care Management Follow Up    Length of Stay (days): 61    Expected Discharge Date: (pending funding approval for TBI services)     Concerns to be Addressed: discharge planning     Patient plan of care discussed at interdisciplinary rounds: Yes    Anticipated Discharge Disposition: Group Home     Anticipated Discharge Services:    Anticipated Discharge DME: None    Patient/family educated on Medicare website which has current facility and service quality ratings:    Education Provided on the Discharge Plan:    Patient/Family in Agreement with the Plan:      Referrals Placed by CM/SW: Post Acute Facilities  Private pay costs discussed: Not applicable    Additional Information:  Writer contacted  [facility] at [phone], Clinical Coordinator for [facility].  Updated [facility] that patient attempted to exit the hospital requiring security to bring patient back to his hospital room.  This is not the first event like this.   In reviewing the circumstances with one RN who was present during episode she reports shortly before patient left his room he had been asking for a cigarette and also did not have on the prescribe nicotine patch.  This information was relayed to [facility] and she said the house patient  will be admitted to does allow supervised smoking on their patio  Funding for patient is not finalized yet.  .[facility] plans to email the [facility] , Celia Layne, to request additional funding to include  a one to one staff ratio for patient initially upon his admission and until staff can better assess patient.   [facility] will include writer in the email so writer will be kept updated regarding funding progress.          BETTYE Castillo

## 2020-11-19 NOTE — PROGRESS NOTES
[facility]    Medicine Progress Note - Hospitalist Service       Date of Admission:  9/9/2020  Assessment & Plan       John Juárez is a 54 year old male admitted on 9/9/2020.  PMHx of schizophrenia, hx of TBI, alcohol use disorder, COPD, hyperlipidemia and hypothyroidism who was admitted on 9/9/2020 for evaluation of aggressive behavior at his group home. His medical condition remains stable, though his group home is now unwilling to take him back. Hospitalization has been prolonged while arranging for new placement.           Neurocognitive disorder dt hx of TBI and alcohol use disorder  Schizophrenia  Had been residing in group home prior to admission.  Brought to hospital for evaluation of aggressive behaviors. Group home now unwilling to take him back. Has had numerous CODE 21s called this stay. Seen by psych, meds adjusted. Is currently under civil commitment and court hold through [facility] until 7/31/21.  Patient has been undergoing paper work for funding and placement.     -- Patient has been last evaluated by psych on September 29th , no recent change in med's   --.Patient has been stable on current meds  -- Currently patient is on Cogentin 0.25 mg twice daily, Haldol 7 mg twice daily, Namenda 5 mg daily and Effexor 75 mg p.o. daily, continue current meds   -- PRN zyprexa and haldol available for agitation . Occasionally requires PRNS .  -- per social workers: Celia Layne, [phone], is the [facility] .She has requested [facility] to submit their funding request, still awaiting funding  Once MA and [place] services are approved patient will be able to move the the [facility]     Hyperlipidemia  -- Chronic and stable on aspirin and statin     COPD; Not O2 dependent.  -- Stable on salmeterol     Hypothyroidism  --Chronic and stable on levothyroxine     Tobacco Use  --Using nicotine patch and prn gum       Diet: Room  Service  Combination Diet Regular Diet Adult; Safe Tray - NO utensils    DVT Prophylaxis: Pneumatic Compression Devices  Valentin Catheter: not present  Code Status: Full Code           Disposition Plan   Expected discharge: Discharge date is unclear, pending safe disposition plan , SW are following as above, awaiting funding.    Entered: Sushil Genao MD 11/19/2020, 3:36 PM       The patient's care was discussed with the Bedside Nurse and Patient.    Sushil Genao MD  Hospitalist Service  [facility]  Contact information available via [facility] Paging/Directory    ______________________________________________________________________    Interval History   Reports he wants a cigarette.  Informed this is not possible but we could give him some gum, which he agreed to.  No other complaints, reports he has a mild tremor at baseline.      Data reviewed today: I reviewed all medications, new labs and imaging results over the last 24 hours. I personally reviewed no images or EKG's today.    Physical Exam   Vital Signs: Temp: 96.6  F (35.9  C) Temp src: Oral BP: 106/72 Pulse: 81   Resp: 16 SpO2: 96 % O2 Device: None (Room air)    Weight: 156 lbs 8 oz  General Appearance: Thin male in NAD, sitting up in chair  Respiratory: lungs CTAB, no wheezes or crackles  Cardiovascular: RRR, normal s1/s2  GI:   Skin:   Other: Cranial nerves grossly intact     Data   Medications       aspirin  81 mg Oral Daily     atorvastatin  80 mg Oral At Bedtime     benztropine  0.25 mg Oral BID     docusate sodium  100 mg Oral BID     haloperidol  7 mg Oral BID     levothyroxine  88 mcg Oral Daily     memantine  5 mg Oral Daily     nicotine  1 patch Transdermal Daily     nicotine   Transdermal TID     polyethylene glycol  17 g Oral Daily     salmeterol  1 puff Inhalation BID     venlafaxine  75 mg Oral Daily with breakfast

## 2020-11-19 NOTE — PROGRESS NOTES
Pt arrived to the unit around 1445. Pt settled, vitals taken. Belongings (clothing/electronic game/cigarette) locked in 835.

## 2020-11-19 NOTE — PLAN OF CARE
DATE & TIME: 11/18/20 1060-3060  Cognitive Concerns/ Orientation : Alert to self only.   BEHAVIOR & AGGRESSION TOOL COLOR: Green  ABNL VS/O2: VSS on room air, daily.  MOBILITY: Independent in room. Door alarm active.  PAIN MANAGMENT: Denies  DIET: Regular, safe tray-no utensils  BOWEL/BLADDER: Up to bathroom independent   ABNL LAB/BG: No labs   DRAIN/DEVICES: No IV access  SKIN: Bruised, chest scars  D/C DAY/GOALS/PLACE: Pending placement and funding, to [facility]. Civil commitment/court hold through [facility] valid until 07/31/2021. Social work following.

## 2020-11-19 NOTE — PLAN OF CARE
DATE & TIME: 11/18/20 7361-6486  Cognitive Concerns/ Orientation : Alert to self only.   BEHAVIOR & AGGRESSION TOOL COLOR: Green  ABNL VS/O2: VSS on room air   MOBILITY: Independent in room; walked around unit. Door alarm active  PAIN MANAGMENT: Denies  DIET: Regular, safe tray-no utensils  BOWEL/BLADDER: Up to bathroom independent   ABNL LAB/BG: No labs   DRAIN/DEVICES: No IV access  SKIN: Bruised, chest scars  D/C DAY/GOALS/PLACE: Pending placement and funding, to [facility]. Civil commitment/court hold through [facility] valid until 07/31/2021. Social work following. Refused Nicotine patch.    1530- patient attempted to leave hospital. Quickly exited room and ran down the stairs. Security called. Patient returned to room and given oral Zyprexa, brownie, and chocolate milk. Nursing continues to monitor patient closely.

## 2020-11-19 NOTE — PLAN OF CARE
Cognitive Concerns/ Orientation : Alert to self only.   BEHAVIOR & AGGRESSION TOOL COLOR: Red. Currently calm.   ABNL VS/O2: VSS on room air, daily.  MOBILITY: Independent in room. Door alarm active.  PAIN MANAGMENT: Denies  DIET: Regular. Good appetite. Snacks offered.   BOWEL/BLADDER: Up to bathroom independent   ABNL LAB/BG: No labs   DRAIN/DEVICES: No IV access  SKIN: Bruised, chest scars  D/C DAY/GOALS/PLACE: Pending transfer to station 88 when bed comes available. Pending placement and funding to [facility]. Civil commitment/court hold through [facility] valid until 07/31/2021. Social work following. Nicotine patch applied.

## 2020-11-20 PROCEDURE — 120N000001 HC R&B MED SURG/OB

## 2020-11-20 PROCEDURE — 99231 SBSQ HOSP IP/OBS SF/LOW 25: CPT | Performed by: HOSPITALIST

## 2020-11-20 PROCEDURE — 250N000013 HC RX MED GY IP 250 OP 250 PS 637: Performed by: HOSPITALIST

## 2020-11-20 PROCEDURE — 250N000013 HC RX MED GY IP 250 OP 250 PS 637: Performed by: PSYCHIATRY & NEUROLOGY

## 2020-11-20 PROCEDURE — 250N000013 HC RX MED GY IP 250 OP 250 PS 637: Performed by: INTERNAL MEDICINE

## 2020-11-20 PROCEDURE — 250N000013 HC RX MED GY IP 250 OP 250 PS 637: Performed by: NURSE PRACTITIONER

## 2020-11-20 RX ADMIN — ASPIRIN 81 MG: 81 TABLET, COATED ORAL at 08:12

## 2020-11-20 RX ADMIN — VENLAFAXINE HYDROCHLORIDE 75 MG: 75 CAPSULE, EXTENDED RELEASE ORAL at 08:12

## 2020-11-20 RX ADMIN — DOCUSATE SODIUM 100 MG: 100 CAPSULE, LIQUID FILLED ORAL at 08:12

## 2020-11-20 RX ADMIN — SALMETEROL XINAFOATE 1 PUFF: 50 POWDER, METERED ORAL; RESPIRATORY (INHALATION) at 20:47

## 2020-11-20 RX ADMIN — HALOPERIDOL 7 MG: 5 TABLET ORAL at 08:12

## 2020-11-20 RX ADMIN — HALOPERIDOL 7 MG: 5 TABLET ORAL at 20:46

## 2020-11-20 RX ADMIN — NICOTINE 1 PATCH: 21 PATCH, EXTENDED RELEASE TRANSDERMAL at 08:12

## 2020-11-20 RX ADMIN — NICOTINE POLACRILEX 2 MG: 2 GUM, CHEWING ORAL at 08:16

## 2020-11-20 RX ADMIN — POLYETHYLENE GLYCOL 3350 17 G: 17 POWDER, FOR SOLUTION ORAL at 08:12

## 2020-11-20 RX ADMIN — ATORVASTATIN CALCIUM 80 MG: 40 TABLET, FILM COATED ORAL at 20:46

## 2020-11-20 RX ADMIN — Medication 0.25 MG: at 20:46

## 2020-11-20 RX ADMIN — LEVOTHYROXINE SODIUM 88 MCG: 88 TABLET ORAL at 08:12

## 2020-11-20 RX ADMIN — OLANZAPINE 10 MG: 5 TABLET, ORALLY DISINTEGRATING ORAL at 13:41

## 2020-11-20 RX ADMIN — Medication 0.25 MG: at 08:12

## 2020-11-20 RX ADMIN — DOCUSATE SODIUM 100 MG: 100 CAPSULE, LIQUID FILLED ORAL at 20:46

## 2020-11-20 RX ADMIN — NICOTINE POLACRILEX 2 MG: 2 GUM, CHEWING ORAL at 20:46

## 2020-11-20 RX ADMIN — SALMETEROL XINAFOATE 1 PUFF: 50 POWDER, METERED ORAL; RESPIRATORY (INHALATION) at 08:17

## 2020-11-20 RX ADMIN — NICOTINE POLACRILEX 2 MG: 2 GUM, CHEWING ORAL at 13:41

## 2020-11-20 RX ADMIN — HALOPERIDOL 5 MG: 5 TABLET ORAL at 14:16

## 2020-11-20 RX ADMIN — MEMANTINE 5 MG: 5 TABLET ORAL at 08:12

## 2020-11-20 RX ADMIN — NICOTINE POLACRILEX 2 MG: 2 GUM, CHEWING ORAL at 11:54

## 2020-11-20 ASSESSMENT — ACTIVITIES OF DAILY LIVING (ADL)
ADLS_ACUITY_SCORE: 13

## 2020-11-20 NOTE — PROGRESS NOTES
[facility]    Medicine Progress Note - Hospitalist Service       Date of Admission:  9/9/2020  Assessment & Plan       John Juárez is a 54 year old male admitted on 9/9/2020.  PMHx of schizophrenia, hx of TBI, alcohol use disorder, COPD, hyperlipidemia and hypothyroidism who was admitted on 9/9/2020 for evaluation of aggressive behavior at his group home. His medical condition remains stable, though his group home is now unwilling to take him back. Hospitalization has been prolonged while arranging for new placement.           Neurocognitive disorder dt hx of TBI and alcohol use disorder  Schizophrenia  Had been residing in group home prior to admission.  Brought to hospital for evaluation of aggressive behaviors. Group home now unwilling to take him back. Has had numerous CODE 21s called this stay. Seen by psych, meds adjusted. Is currently under civil commitment and court hold through [facility] until 7/31/21.  Patient has been undergoing paper work for funding and placement.     -- Patient has been last evaluated by psych on September 29th , no recent change in med's   --.Patient has been stable on current meds  -- Currently patient is on Cogentin 0.25 mg twice daily, Haldol 7 mg twice daily, Namenda 5 mg daily and Effexor 75 mg p.o. daily, continue current meds   -- PRN zyprexa and haldol available for agitation . Occasionally requires PRNS .  -- per social workers: Celia Layne, [phone], is the [facility] .She has requested [facility] to submit their funding request, still awaiting funding  Once MA and [facility] services are approved patient will be able to move the the [facility]     Hyperlipidemia  -- Chronic and stable on aspirin and statin     COPD; Not O2 dependent.  -- Stable on salmeterol     Hypothyroidism  --Chronic and stable on levothyroxine     Tobacco Use  --Using nicotine patch and prn gum       Diet: Room  Service  Combination Diet Regular Diet Adult; Safe Tray - NO utensils    DVT Prophylaxis: Pneumatic Compression Devices  Valentin Catheter: not present  Code Status: Full Code           Disposition Plan   Expected discharge: Discharge date is unclear, pending safe disposition plan , SW are following as above, awaiting funding.    Entered: Sushil Genao MD 11/20/2020, 1:05 PM       The patient's care was discussed with the Bedside Nurse and Patient.    Sushil Genao MD  Hospitalist Service  [facility]  Contact information available via [facility] Paging/Directory    ______________________________________________________________________    Interval History   Wants to go outside to smoke.  Questions when he will discharge.  Offers no physical complaints.       Data reviewed today: I reviewed all medications, new labs and imaging results over the last 24 hours. I personally reviewed no images or EKG's today.    Physical Exam   Vital Signs: Temp: 96.9  F (36.1  C) Temp src: Oral BP: 106/69 Pulse: 74   Resp: 16 SpO2: 97 % O2 Device: None (Room air)    Weight: 156 lbs 8 oz  General Appearance: Thin male in NAD, lying in bed  Respiratory: respirations non-labored on room air  Cardiovascular:   GI:   Skin:   Other: Cranial nerves grossly intact     Data   Medications       aspirin  81 mg Oral Daily     atorvastatin  80 mg Oral At Bedtime     benztropine  0.25 mg Oral BID     docusate sodium  100 mg Oral BID     haloperidol  7 mg Oral BID     levothyroxine  88 mcg Oral Daily     memantine  5 mg Oral Daily     nicotine  1 patch Transdermal Daily     nicotine   Transdermal TID     polyethylene glycol  17 g Oral Daily     salmeterol  1 puff Inhalation BID     venlafaxine  75 mg Oral Daily with breakfast

## 2020-11-20 NOTE — PROGRESS NOTES
Slept well through night.  No complaints of pain .  Hoarse voice noted  Door alarm activated Rory patch on left upper arm

## 2020-11-20 NOTE — PLAN OF CARE
Alert to self only. VSS on RA. Denies pain. Regular diet tolerating well, snacks offered throughout the day. PRN zyprexa and haldol given to patient for increased agitation, wanting to leave the hospital. Continent of bowel and bladder. Nicotine patch on right arm, offered nicotine gum through out the day. No IV access. Independent in room, door alarm active

## 2020-11-20 NOTE — PLAN OF CARE
A but confused. Calm and cooperative this shift. Set off door alarms few times, pt cooperative. Asking to go out and smoke. Nicotine patch on and Nicotine gum offered, helping. VSS on RA. Up independently in the room. Regular diet. On court hold and civil commitment. Discharge pending placement. SW following. Continue to monitor.

## 2020-11-21 PROCEDURE — 250N000013 HC RX MED GY IP 250 OP 250 PS 637: Performed by: HOSPITALIST

## 2020-11-21 PROCEDURE — 250N000013 HC RX MED GY IP 250 OP 250 PS 637: Performed by: INTERNAL MEDICINE

## 2020-11-21 PROCEDURE — 99231 SBSQ HOSP IP/OBS SF/LOW 25: CPT | Performed by: INTERNAL MEDICINE

## 2020-11-21 PROCEDURE — 250N000013 HC RX MED GY IP 250 OP 250 PS 637: Performed by: PSYCHIATRY & NEUROLOGY

## 2020-11-21 PROCEDURE — 120N000001 HC R&B MED SURG/OB

## 2020-11-21 PROCEDURE — 250N000013 HC RX MED GY IP 250 OP 250 PS 637: Performed by: NURSE PRACTITIONER

## 2020-11-21 RX ADMIN — OLANZAPINE 10 MG: 5 TABLET, ORALLY DISINTEGRATING ORAL at 13:07

## 2020-11-21 RX ADMIN — VENLAFAXINE HYDROCHLORIDE 75 MG: 75 CAPSULE, EXTENDED RELEASE ORAL at 09:32

## 2020-11-21 RX ADMIN — DOCUSATE SODIUM 100 MG: 100 CAPSULE, LIQUID FILLED ORAL at 20:20

## 2020-11-21 RX ADMIN — ASPIRIN 81 MG: 81 TABLET, COATED ORAL at 09:33

## 2020-11-21 RX ADMIN — ATORVASTATIN CALCIUM 80 MG: 40 TABLET, FILM COATED ORAL at 20:20

## 2020-11-21 RX ADMIN — MEMANTINE 5 MG: 5 TABLET ORAL at 09:03

## 2020-11-21 RX ADMIN — LEVOTHYROXINE SODIUM 88 MCG: 88 TABLET ORAL at 09:32

## 2020-11-21 RX ADMIN — Medication 0.25 MG: at 20:21

## 2020-11-21 RX ADMIN — Medication 0.25 MG: at 09:32

## 2020-11-21 RX ADMIN — SALMETEROL XINAFOATE 1 PUFF: 50 POWDER, METERED ORAL; RESPIRATORY (INHALATION) at 10:04

## 2020-11-21 RX ADMIN — NICOTINE 1 PATCH: 21 PATCH, EXTENDED RELEASE TRANSDERMAL at 09:31

## 2020-11-21 RX ADMIN — HALOPERIDOL 7 MG: 5 TABLET ORAL at 09:33

## 2020-11-21 RX ADMIN — SALMETEROL XINAFOATE 1 PUFF: 50 POWDER, METERED ORAL; RESPIRATORY (INHALATION) at 22:42

## 2020-11-21 RX ADMIN — DOCUSATE SODIUM 100 MG: 100 CAPSULE, LIQUID FILLED ORAL at 09:32

## 2020-11-21 RX ADMIN — HALOPERIDOL 5 MG: 5 TABLET ORAL at 16:47

## 2020-11-21 RX ADMIN — NICOTINE POLACRILEX 2 MG: 2 GUM, CHEWING ORAL at 09:31

## 2020-11-21 RX ADMIN — HALOPERIDOL 7 MG: 5 TABLET ORAL at 20:21

## 2020-11-21 ASSESSMENT — ACTIVITIES OF DAILY LIVING (ADL)
ADLS_ACUITY_SCORE: 13

## 2020-11-21 NOTE — PROGRESS NOTES
[facility]    Medicine Progress Note - Hospitalist Service       Date of Admission:  9/9/2020  Assessment & Plan       John Juárez is a 54 year old male admitted on 9/9/2020.  PMHx of schizophrenia, hx of TBI, alcohol use disorder, COPD, hyperlipidemia and hypothyroidism who was admitted on 9/9/2020 for evaluation of aggressive behavior at his group home. His medical condition remains stable, though his group home is now unwilling to take him back. Hospitalization has been prolonged while arranging for new placement.      Neurocognitive disorder dt hx of TBI and alcohol use disorder  Schizophrenia  Had been residing in group home prior to admission.  Brought to hospital for evaluation of aggressive behaviors. Group home now unwilling to take him back. Has had numerous CODE 21s called this stay. Seen by psych, meds adjusted. Is currently under civil commitment and court hold through [facility] until 7/31/21.  Patient has been undergoing paper work for funding and placement.     -- Patient has been last evaluated by psych on September 29th , no recent change in med's   --.Patient has been stable on current meds  -- Currently patient is on Cogentin 0.25 mg twice daily, Haldol 7 mg twice daily, Namenda 5 mg daily and Effexor 75 mg p.o. daily, continue current meds   -- PRN zyprexa and haldol available for agitation . Occasionally requires PRNS .  -- per social workers: Celia Layne, [phone], is the [facility] .She has requested [facility] to submit their funding request, still awaiting funding  Once MA and [facility] services are approved patient will be able to move the the [facility]     Hyperlipidemia  -- Chronic and stable on aspirin and statin     COPD; Not O2 dependent.  -- Stable on salmeterol     Hypothyroidism  --Chronic and stable on levothyroxine     Tobacco Use  --Using nicotine patch and prn gum          Diet: Room  Service  Combination Diet Regular Diet Adult; Safe Tray - NO utensils    DVT Prophylaxis: Pneumatic Compression Devices  Valentin Catheter: not present  Code Status: Full Code           Disposition Plan   Expected discharge: TBD, recommended to new group home once funding obtained.  Entered: Jose Isabel DO 11/21/2020, 9:52 AM       The patient's care was discussed with the Care Coordinator/ and Patient.    Jose Isabel DO  Hospitalist Service  [facility]  Contact information available via [facility] Paging/Directory    ______________________________________________________________________    Interval History   Patient seen and evaluated.  No acute events over night.  No fevers reported.  Patient states that he is leaving and discussed with him the current situation of finding a new group home and awaiting funding.     Data reviewed today: I reviewed all medications, new labs and imaging results over the last 24 hours. I personally reviewed no images or EKG's today.    Physical Exam   Vital Signs: Temp: 96.2  F (35.7  C) Temp src: Oral BP: 106/85 Pulse: 91   Resp: 16 SpO2: 98 % O2 Device: None (Room air)    Weight: 156 lbs 8 oz  General Appearance: Resting comfortably.  NAD.  Refused auscultation or palpation   Respiratory:  Refused auscultation or palpation  Cardiovascular:  Refused auscultation or palpation  GI:  Refused auscultation or palpation  Skin: No obvious rashes or cyanosis to exposed skin   Other:  Refused auscultation or palpation     Data   No lab results found in last 7 days.    No results found for this or any previous visit (from the past 24 hour(s)).

## 2020-11-21 NOTE — PLAN OF CARE
A&Ox1.  Denies pain.  Independent in room.  No IV access.  Tolerating regular diet without problems; snacks provided throughout the shift.  Nicotine patch to R arm; PRN gum given x 1, no other PRN medications needed this shift.  Door alarm on.  Discharge pending placement.

## 2020-11-22 PROCEDURE — 99231 SBSQ HOSP IP/OBS SF/LOW 25: CPT | Performed by: INTERNAL MEDICINE

## 2020-11-22 PROCEDURE — 120N000001 HC R&B MED SURG/OB

## 2020-11-22 PROCEDURE — 250N000013 HC RX MED GY IP 250 OP 250 PS 637: Performed by: HOSPITALIST

## 2020-11-22 PROCEDURE — 250N000013 HC RX MED GY IP 250 OP 250 PS 637: Performed by: NURSE PRACTITIONER

## 2020-11-22 PROCEDURE — 250N000013 HC RX MED GY IP 250 OP 250 PS 637: Performed by: INTERNAL MEDICINE

## 2020-11-22 PROCEDURE — 250N000013 HC RX MED GY IP 250 OP 250 PS 637: Performed by: PSYCHIATRY & NEUROLOGY

## 2020-11-22 RX ADMIN — MEMANTINE 5 MG: 5 TABLET ORAL at 09:41

## 2020-11-22 RX ADMIN — ASPIRIN 81 MG: 81 TABLET, COATED ORAL at 09:42

## 2020-11-22 RX ADMIN — MELATONIN TAB 3 MG 3 MG: 3 TAB at 22:20

## 2020-11-22 RX ADMIN — NICOTINE POLACRILEX 2 MG: 2 GUM, CHEWING ORAL at 09:41

## 2020-11-22 RX ADMIN — VENLAFAXINE HYDROCHLORIDE 75 MG: 75 CAPSULE, EXTENDED RELEASE ORAL at 09:42

## 2020-11-22 RX ADMIN — NICOTINE 1 PATCH: 21 PATCH, EXTENDED RELEASE TRANSDERMAL at 09:45

## 2020-11-22 RX ADMIN — Medication 0.25 MG: at 21:27

## 2020-11-22 RX ADMIN — DOCUSATE SODIUM 100 MG: 100 CAPSULE, LIQUID FILLED ORAL at 21:27

## 2020-11-22 RX ADMIN — HALOPERIDOL 7 MG: 5 TABLET ORAL at 21:27

## 2020-11-22 RX ADMIN — LEVOTHYROXINE SODIUM 88 MCG: 88 TABLET ORAL at 09:42

## 2020-11-22 RX ADMIN — ATORVASTATIN CALCIUM 80 MG: 40 TABLET, FILM COATED ORAL at 21:27

## 2020-11-22 RX ADMIN — HALOPERIDOL 7 MG: 5 TABLET ORAL at 09:42

## 2020-11-22 RX ADMIN — DOCUSATE SODIUM 100 MG: 100 CAPSULE, LIQUID FILLED ORAL at 09:42

## 2020-11-22 RX ADMIN — Medication 0.25 MG: at 09:41

## 2020-11-22 RX ADMIN — SALMETEROL XINAFOATE 1 PUFF: 50 POWDER, METERED ORAL; RESPIRATORY (INHALATION) at 21:26

## 2020-11-22 RX ADMIN — SALMETEROL XINAFOATE 1 PUFF: 50 POWDER, METERED ORAL; RESPIRATORY (INHALATION) at 09:42

## 2020-11-22 ASSESSMENT — ACTIVITIES OF DAILY LIVING (ADL)
ADLS_ACUITY_SCORE: 13

## 2020-11-22 NOTE — PLAN OF CARE
[id number]: Pt oriented to self. Slow to answer and minimal eye contact. VSS. Denied pain. Tolerating diet. Distractions provided, still wants to leave to smoke outside, restless- PRN PO zyprexa given x1 and haldol x1. Nicotine patch on L shoulder and nicotine gum given x1 PRN. PT redirectable but will open door to set alarm off and then sits back in chair frequently. Discharge pending placement/funding.

## 2020-11-22 NOTE — PLAN OF CARE
Alert to self, reorient prn. No c/o pain, has been calm and cooperative overnight; does not use call light, will open door if wanting snacks or something to drink. No prn needed overnight, has taken all scheduled meds. Nicotine patch L deltoid. Waiting MA and waiver of services, then will go to group home.

## 2020-11-22 NOTE — PROGRESS NOTES
[facility]    Medicine Progress Note - Hospitalist Service       Date of Admission:  9/9/2020  Assessment & Plan       John Juárez is a 54 year old male admitted on 9/9/2020.  PMHx of schizophrenia, hx of TBI, alcohol use disorder, COPD, hyperlipidemia and hypothyroidism who was admitted on 9/9/2020 for evaluation of aggressive behavior at his group home. His medical condition remains stable, though his group home is now unwilling to take him back. Hospitalization has been prolonged while arranging for new placement.      Neurocognitive disorder dt hx of TBI and alcohol use disorder  Schizophrenia  Had been residing in group home prior to admission.  Brought to hospital for evaluation of aggressive behaviors. Group home now unwilling to take him back. Has had numerous CODE 21s called this stay. Seen by psych, meds adjusted. Is currently under civil commitment and court hold through [facility] until 7/31/21.  Patient has been undergoing paper work for funding and placement.  -- Patient has been last evaluated by psych on September 29th , no recent change in med's   --.Patient has been stable on current meds  -- Currently patient is on Cogentin 0.25 mg twice daily, Haldol 7 mg twice daily, Namenda 5 mg daily and Effexor 75 mg p.o. daily, continue current meds   -- PRN zyprexa and haldol available for agitation . Occasionally requires PRNS .  -- per social workers: Celia Layne, [phone], is the [facility] .She has requested [facility] to submit their funding request, still awaiting funding  Once MA and [facility] services are approved patient will be able to move the the [facility]     Hyperlipidemia  -- Chronic and stable on aspirin and statin     COPD; Not O2 dependent.  -- Stable on salmeterol     Hypothyroidism  --Chronic and stable on levothyroxine     Tobacco Use  --Using nicotine patch and prn gum          Diet: Room  Service  Combination Diet Regular Diet Adult; Safe Tray - NO utensils    DVT Prophylaxis: Pneumatic Compression Devices  Valentin Catheter: not present  Code Status: Full Code           Disposition Plan   Expected discharge: TBD, recommended to new group home once funding obtained.  Entered: Jose Isbael DO 11/22/2020, 11:45 AM       The patient's care was discussed with the Care Coordinator/ and Patient.    Jose Isabel DO  Hospitalist Service  [facility]  Contact information available via [facility] Paging/Directory    ______________________________________________________________________    Interval History   Patient seen and evaluated.  No acute events over night.  Patient wants to be discharged and discussed with him again why he is still in the hospital     Data reviewed today: I reviewed all medications, new labs and imaging results over the last 24 hours. I personally reviewed no images or EKG's today.    Physical Exam   Vital Signs: Temp: 96.5  F (35.8  C) Temp src: Oral BP: 124/72 Pulse: 82   Resp: 16 SpO2: 96 % O2 Device: None (Room air)    Weight: 156 lbs 8 oz  General Appearance: Resting comfortably.  NAD.  Refused auscultation or palpation   Respiratory:  Refused auscultation or palpation  Cardiovascular:  Refused auscultation or palpation  GI:  Refused auscultation or palpation  Skin: No obvious rashes or cyanosis to exposed skin   Other:  Refused auscultation or palpation     Data   No lab results found in last 7 days.    No results found for this or any previous visit (from the past 24 hour(s)).

## 2020-11-23 PROCEDURE — 250N000013 HC RX MED GY IP 250 OP 250 PS 637: Performed by: PSYCHIATRY & NEUROLOGY

## 2020-11-23 PROCEDURE — 250N000013 HC RX MED GY IP 250 OP 250 PS 637: Performed by: INTERNAL MEDICINE

## 2020-11-23 PROCEDURE — 120N000001 HC R&B MED SURG/OB

## 2020-11-23 PROCEDURE — 250N000013 HC RX MED GY IP 250 OP 250 PS 637: Performed by: HOSPITALIST

## 2020-11-23 PROCEDURE — 99231 SBSQ HOSP IP/OBS SF/LOW 25: CPT | Performed by: INTERNAL MEDICINE

## 2020-11-23 PROCEDURE — 250N000013 HC RX MED GY IP 250 OP 250 PS 637: Performed by: NURSE PRACTITIONER

## 2020-11-23 RX ADMIN — HALOPERIDOL 7 MG: 5 TABLET ORAL at 21:13

## 2020-11-23 RX ADMIN — LEVOTHYROXINE SODIUM 88 MCG: 88 TABLET ORAL at 08:25

## 2020-11-23 RX ADMIN — MEMANTINE 5 MG: 5 TABLET ORAL at 08:25

## 2020-11-23 RX ADMIN — NICOTINE 1 PATCH: 21 PATCH, EXTENDED RELEASE TRANSDERMAL at 08:32

## 2020-11-23 RX ADMIN — ASPIRIN 81 MG: 81 TABLET, COATED ORAL at 08:24

## 2020-11-23 RX ADMIN — SALMETEROL XINAFOATE 1 PUFF: 50 POWDER, METERED ORAL; RESPIRATORY (INHALATION) at 21:17

## 2020-11-23 RX ADMIN — HALOPERIDOL 7 MG: 5 TABLET ORAL at 08:24

## 2020-11-23 RX ADMIN — NICOTINE POLACRILEX 2 MG: 2 GUM, CHEWING ORAL at 15:57

## 2020-11-23 RX ADMIN — DOCUSATE SODIUM 100 MG: 100 CAPSULE, LIQUID FILLED ORAL at 21:13

## 2020-11-23 RX ADMIN — VENLAFAXINE HYDROCHLORIDE 75 MG: 75 CAPSULE, EXTENDED RELEASE ORAL at 08:25

## 2020-11-23 RX ADMIN — ATORVASTATIN CALCIUM 80 MG: 40 TABLET, FILM COATED ORAL at 21:13

## 2020-11-23 RX ADMIN — SALMETEROL XINAFOATE 1 PUFF: 50 POWDER, METERED ORAL; RESPIRATORY (INHALATION) at 08:33

## 2020-11-23 RX ADMIN — Medication 0.25 MG: at 21:13

## 2020-11-23 RX ADMIN — POLYETHYLENE GLYCOL 3350 17 G: 17 POWDER, FOR SOLUTION ORAL at 08:27

## 2020-11-23 RX ADMIN — DOCUSATE SODIUM 100 MG: 100 CAPSULE, LIQUID FILLED ORAL at 08:25

## 2020-11-23 RX ADMIN — OLANZAPINE 10 MG: 5 TABLET, ORALLY DISINTEGRATING ORAL at 13:45

## 2020-11-23 RX ADMIN — Medication 0.25 MG: at 08:25

## 2020-11-23 RX ADMIN — HALOPERIDOL 5 MG: 5 TABLET ORAL at 18:25

## 2020-11-23 ASSESSMENT — ACTIVITIES OF DAILY LIVING (ADL)
ADLS_ACUITY_SCORE: 13

## 2020-11-23 NOTE — PLAN OF CARE
Alert to self and place, reorient prn. Has been calm and cooperative. Gave prn melatonin for sleep. Nicotine patch on R deltoid. Is on civil commitment until 7/31/21, discharge pending MA/waiver.

## 2020-11-23 NOTE — PROGRESS NOTES
Care Management Follow Up    Length of Stay (days): 65    Expected Discharge Date: (pending funding approval for TBI services)     Concerns to be Addressed: discharge planning     Patient plan of care discussed at interdisciplinary rounds: Yes    Anticipated Discharge Disposition: Group Home     Anticipated Discharge Services:    Anticipated Discharge DME: None    Patient/family educated on Medicare website which has current facility and service quality ratings:    Education Provided on the Discharge Plan:    Patient/Family in Agreement with the Plan:      Referrals Placed by CM/SW: Post Acute Facilities  Private pay costs discussed: Not applicable    Additional Information:  Writer received a call today from [employer] .  She had some additional questions regarding patient's care needs based on the revised funding request by [facility].  Writer was able to answer her questions. Ms Layne will let writer know when she and [facility] have agreed upon funding.       ASIM CastilloSW

## 2020-11-23 NOTE — PROGRESS NOTES
[facility]    Medicine Progress Note - Hospitalist Service       Date of Admission:  9/9/2020  Assessment & Plan       John Juárez is a 54 year old male admitted on 9/9/2020.  PMHx of schizophrenia, hx of TBI, alcohol use disorder, COPD, hyperlipidemia and hypothyroidism who was admitted on 9/9/2020 for evaluation of aggressive behavior at his group home. His medical condition remains stable, though his group home is now unwilling to take him back. Hospitalization has been prolonged while arranging for new placement.      Neurocognitive disorder dt hx of TBI and alcohol use disorder  Schizophrenia  Had been residing in group home prior to admission.  Brought to hospital for evaluation of aggressive behaviors. Group home now unwilling to take him back. Has had numerous CODE 21s called this stay. Seen by psych, meds adjusted. Is currently under civil commitment and court hold through [facility] until 7/31/21.  Patient has been undergoing paper work for funding and placement.  -- Patient has been last evaluated by psych on September 29th , no recent change in med's   --.Patient has been stable on current meds  -- Currently patient is on Cogentin 0.25 mg twice daily, Haldol 7 mg twice daily, Namenda 5 mg daily and Effexor 75 mg p.o. daily, continue current meds   -- PRN zyprexa and haldol available for agitation . Occasionally requires PRNS .  -- per social workers: Celia Layne, [phone], is the [facility] .She has requested [facility] to submit their funding request, still awaiting funding  Once MA and [facility] services are approved patient will be able to move the the [facility]     Hyperlipidemia  -- Chronic and stable on aspirin and statin     COPD; Not O2 dependent.  -- Stable on salmeterol     Hypothyroidism  --Chronic and stable on levothyroxine     Tobacco Use  --Using nicotine patch and prn gum          Diet: Room  Service  Combination Diet Regular Diet Adult; Safe Tray - NO utensils    DVT Prophylaxis: Pneumatic Compression Devices  Valentin Catheter: not present  Code Status: Full Code           Disposition Plan   Expected discharge: TBD, recommended to new group home once funding obtained.  Entered: Jose Isabel DO 11/23/2020, 11:24 AM       The patient's care was discussed with the Care Coordinator/ and Patient.    Jose Isabel DO  Hospitalist Service  [facility]  Contact information available via [facility] Paging/Directory    ______________________________________________________________________    Interval History   Patient seen and evaluated.  No acute events over night.  Patient sleeping.  Nursing reports no concerns     Data reviewed today: I reviewed all medications, new labs and imaging results over the last 24 hours. I personally reviewed no images or EKG's today.    Physical Exam   Vital Signs: Temp: 96.8  F (36  C) Temp src: Oral BP: 114/72 Pulse: 74   Resp: 16 SpO2: 99 % O2 Device: None (Room air)    Weight: 156 lbs 8 oz  General Appearance: Sleeping.  NAD    Respiratory:  No increased work of breathing   Cardiovascular:  RRR  GI:  Did not examine   Skin: No obvious rashes or cyanosis to exposed skin   Other:  Sleeping    Data   No lab results found in last 7 days.    No results found for this or any previous visit (from the past 24 hour(s)).

## 2020-11-23 NOTE — PLAN OF CARE
[id number]: Alert to self and place. Calm and cooperative all shift. Good appetite. R deltoid patch in place. Denied pain. VSS. Discharge pending MA/waiver.

## 2020-11-23 NOTE — PLAN OF CARE
Oriented to self only. Withdrawn, flat affect, irritable at times. Civil commitment in place. Reg diet with good appetite. PO olanzapine and haldol PRN administered for agitation with some relief. No IV access. IND in room, door alarm in place. Does not utilize call light but will open and stand at door. Plan: minimize agitation, offer diversion, continue with observation until discharge pending MA waiver and appropriate placement.

## 2020-11-24 PROCEDURE — 120N000001 HC R&B MED SURG/OB

## 2020-11-24 PROCEDURE — 250N000013 HC RX MED GY IP 250 OP 250 PS 637: Performed by: NURSE PRACTITIONER

## 2020-11-24 PROCEDURE — 250N000013 HC RX MED GY IP 250 OP 250 PS 637: Performed by: INTERNAL MEDICINE

## 2020-11-24 PROCEDURE — 250N000013 HC RX MED GY IP 250 OP 250 PS 637: Performed by: PSYCHIATRY & NEUROLOGY

## 2020-11-24 PROCEDURE — 250N000013 HC RX MED GY IP 250 OP 250 PS 637: Performed by: HOSPITALIST

## 2020-11-24 PROCEDURE — 99231 SBSQ HOSP IP/OBS SF/LOW 25: CPT | Performed by: INTERNAL MEDICINE

## 2020-11-24 RX ORDER — NALOXONE HYDROCHLORIDE 0.4 MG/ML
0.4 INJECTION, SOLUTION INTRAMUSCULAR; INTRAVENOUS; SUBCUTANEOUS
Status: DISCONTINUED | OUTPATIENT
Start: 2020-11-24 | End: 2021-06-30 | Stop reason: HOSPADM

## 2020-11-24 RX ORDER — NALOXONE HYDROCHLORIDE 0.4 MG/ML
0.2 INJECTION, SOLUTION INTRAMUSCULAR; INTRAVENOUS; SUBCUTANEOUS
Status: DISCONTINUED | OUTPATIENT
Start: 2020-11-24 | End: 2021-06-30 | Stop reason: HOSPADM

## 2020-11-24 RX ADMIN — DOCUSATE SODIUM 100 MG: 100 CAPSULE, LIQUID FILLED ORAL at 20:30

## 2020-11-24 RX ADMIN — OLANZAPINE 10 MG: 5 TABLET, ORALLY DISINTEGRATING ORAL at 10:27

## 2020-11-24 RX ADMIN — HALOPERIDOL 5 MG: 5 TABLET ORAL at 18:05

## 2020-11-24 RX ADMIN — NICOTINE POLACRILEX 2 MG: 2 GUM, CHEWING ORAL at 10:07

## 2020-11-24 RX ADMIN — DOCUSATE SODIUM 100 MG: 100 CAPSULE, LIQUID FILLED ORAL at 08:08

## 2020-11-24 RX ADMIN — LEVOTHYROXINE SODIUM 88 MCG: 88 TABLET ORAL at 08:08

## 2020-11-24 RX ADMIN — SALMETEROL XINAFOATE 1 PUFF: 50 POWDER, METERED ORAL; RESPIRATORY (INHALATION) at 20:31

## 2020-11-24 RX ADMIN — MELATONIN TAB 3 MG 3 MG: 3 TAB at 00:02

## 2020-11-24 RX ADMIN — VENLAFAXINE HYDROCHLORIDE 75 MG: 75 CAPSULE, EXTENDED RELEASE ORAL at 08:08

## 2020-11-24 RX ADMIN — ASPIRIN 81 MG: 81 TABLET, COATED ORAL at 08:08

## 2020-11-24 RX ADMIN — Medication 0.25 MG: at 20:30

## 2020-11-24 RX ADMIN — POLYETHYLENE GLYCOL 3350 17 G: 17 POWDER, FOR SOLUTION ORAL at 10:01

## 2020-11-24 RX ADMIN — NICOTINE POLACRILEX 2 MG: 2 GUM, CHEWING ORAL at 00:02

## 2020-11-24 RX ADMIN — HALOPERIDOL 7 MG: 5 TABLET ORAL at 20:30

## 2020-11-24 RX ADMIN — MEMANTINE 5 MG: 5 TABLET ORAL at 08:08

## 2020-11-24 RX ADMIN — NICOTINE 1 PATCH: 21 PATCH, EXTENDED RELEASE TRANSDERMAL at 09:00

## 2020-11-24 RX ADMIN — HALOPERIDOL 7 MG: 5 TABLET ORAL at 08:07

## 2020-11-24 RX ADMIN — ATORVASTATIN CALCIUM 80 MG: 40 TABLET, FILM COATED ORAL at 20:30

## 2020-11-24 RX ADMIN — Medication 0.25 MG: at 08:08

## 2020-11-24 ASSESSMENT — ACTIVITIES OF DAILY LIVING (ADL)
ADLS_ACUITY_SCORE: 13

## 2020-11-24 NOTE — PROGRESS NOTES
[facility]    Medicine Progress Note - Hospitalist Service       Date of Admission:  9/9/2020  Assessment & Plan       John Juárez is a 54 year old male admitted on 9/9/2020.  PMHx of schizophrenia, hx of TBI, alcohol use disorder, COPD, hyperlipidemia and hypothyroidism who was admitted on 9/9/2020 for evaluation of aggressive behavior at his group home. His medical condition remains stable, though his group home is now unwilling to take him back. Hospitalization has been prolonged while arranging for new placement.      Neurocognitive disorder dt hx of TBI and alcohol use disorder  Schizophrenia  Had been residing in group home prior to admission.  Brought to hospital for evaluation of aggressive behaviors. Group home now unwilling to take him back. Has had numerous CODE 21s called this stay. Seen by psych, meds adjusted. Is currently under civil commitment and court hold through [facility] until 7/31/21.  Patient has been undergoing paper work for funding and placement.  -- Patient has been last evaluated by psych on September 29th , no recent change in med's   --.Patient has been stable on current meds  -- Currently patient is on Cogentin 0.25 mg twice daily, Haldol 7 mg twice daily, Namenda 5 mg daily and Effexor 75 mg p.o. daily, continue current meds   -- PRN zyprexa and haldol available for agitation . Occasionally requires PRNS .  -- per social workers: Celia Layne, [phone], is the [facility] .She has requested [facility] to submit their funding request, still awaiting funding  Once MA and [facility] services are approved patient will be able to move the the [facility]     Hyperlipidemia  -- Chronic and stable on aspirin and statin     COPD; Not O2 dependent.  -- Stable on salmeterol     Hypothyroidism  --Chronic and stable on levothyroxine     Tobacco Use  --Using nicotine patch and prn gum          Diet: Room  Service  Combination Diet Regular Diet Adult; Safe Tray - NO utensils    DVT Prophylaxis: Pneumatic Compression Devices  Valentin Catheter: not present  Code Status: Full Code           Disposition Plan   Expected discharge: TBD, recommended to new group home once funding obtained.  Entered: Jose Isabel DO 11/24/2020, 12:14 PM       The patient's care was discussed with the Bedside Nurse, Care Coordinator/ and Patient.    Jose Isabel DO  Hospitalist Service  [facility]  Contact information available via [facility] Paging/Directory    ______________________________________________________________________    Interval History   Patient seen and examined.  No acute events over night.  Sleeping     Data reviewed today: I reviewed all medications, new labs and imaging results over the last 24 hours. I personally reviewed no images or EKG's today.    Physical Exam   Vital Signs: Temp: 96.6  F (35.9  C) Temp src: Oral BP: 90/68 Pulse: 81   Resp: 18 SpO2: 98 % O2 Device: None (Room air)    Weight: 156 lbs 8 oz  General Appearance: Resting comfortably and sleeping  Respiratory: Did not auscultate but not in respiratory distress  Cardiovascular: RRR  GI: Non-distended  Skin: No obvious rashes or cyanosis to exposed skin   Other: No edema     Data   No lab results found in last 7 days.    No results found for this or any previous visit (from the past 24 hour(s)).

## 2020-11-24 NOTE — CODE/RAPID RESPONSE
Brief house NP note  11/24/2020  1030    Code 21 called for John Juárez, who is a 54-year-old male admitted 9/9/2020.  Patient has had a prolonged hospitalization while arranging for placement, as his previous group home was unwilling to accept him.  Patient is a past medical history of schizophrenia, neurocognitive disorder in the setting of TBI, 2000 and alcohol use disorder.    Patient is well-known to attempt elopement, becomes easily agitated and frustrated due to his prolonged hospitalization.  This morning, the patient attempted to elope, he was redirected back to his room VS staff and security, as needed meds available and administered by nursing prior to my arrival.    Patient is on a civil commitment under court hold through [facility] until 7/31/2021.  --No interventions by house staff      MAIA Orta [facility]  Hospitalist Service  House Officer  Pager: [phone] (7a - 6p)

## 2020-11-24 NOTE — PLAN OF CARE
Oriented to self, place at times. Medically stable, under [place] civil commitment. Withdrawn, flat affect, grows frustrated regarding prolonged hospital stay. Did attempt to leave and walked to elevators, requiring code 21 (see note.) PO olanzapine and haldol PRN administered with relief. Reg diet, good appetite. IND in room, door alarm in place. Does not utilize call light, but will open door. Plan: offer diversion and reassurance, PRN for agitation as needed. Discharge pending MA funding approval for group home.

## 2020-11-24 NOTE — PLAN OF CARE
Pt is alert to self and place at times. Easily agitated, frequently saying he needs to go downstairs and did attempt to leave 1x but was able to be redirected back to room without further intervention. VSS on RA, medically stable. On a regular diet w/ great appetite. Up independently in room. Denies pain. Unable to assess LS but pt does have a course, frequent dry cough. Unable to do full assessment d/t when attempted pt became increasingly agitated. Continent of B/B. Nicotine patch to L deltoid in place. Discharge pending placement, SW following.

## 2020-11-24 NOTE — PROGRESS NOTES
Code 21 called at approximately 1015; pt frustrated with prolonged hospitalization and unreceptive to diversion/redirection. Pt went to elevators and attempted to leave, ultimately receptive to return to room. PO Olanzapine 10 mg administered.

## 2020-11-25 PROCEDURE — 250N000013 HC RX MED GY IP 250 OP 250 PS 637: Performed by: PSYCHIATRY & NEUROLOGY

## 2020-11-25 PROCEDURE — 99231 SBSQ HOSP IP/OBS SF/LOW 25: CPT | Performed by: INTERNAL MEDICINE

## 2020-11-25 PROCEDURE — 250N000013 HC RX MED GY IP 250 OP 250 PS 637: Performed by: NURSE PRACTITIONER

## 2020-11-25 PROCEDURE — 120N000001 HC R&B MED SURG/OB

## 2020-11-25 PROCEDURE — 250N000013 HC RX MED GY IP 250 OP 250 PS 637: Performed by: HOSPITALIST

## 2020-11-25 PROCEDURE — 250N000013 HC RX MED GY IP 250 OP 250 PS 637: Performed by: INTERNAL MEDICINE

## 2020-11-25 RX ADMIN — HALOPERIDOL 7 MG: 5 TABLET ORAL at 08:40

## 2020-11-25 RX ADMIN — SALMETEROL XINAFOATE 1 PUFF: 50 POWDER, METERED ORAL; RESPIRATORY (INHALATION) at 11:28

## 2020-11-25 RX ADMIN — HALOPERIDOL 7 MG: 5 TABLET ORAL at 21:13

## 2020-11-25 RX ADMIN — DOCUSATE SODIUM 100 MG: 100 CAPSULE, LIQUID FILLED ORAL at 21:13

## 2020-11-25 RX ADMIN — LEVOTHYROXINE SODIUM 88 MCG: 88 TABLET ORAL at 08:40

## 2020-11-25 RX ADMIN — DOCUSATE SODIUM 100 MG: 100 CAPSULE, LIQUID FILLED ORAL at 08:40

## 2020-11-25 RX ADMIN — Medication 0.25 MG: at 08:40

## 2020-11-25 RX ADMIN — NICOTINE 1 PATCH: 21 PATCH, EXTENDED RELEASE TRANSDERMAL at 08:41

## 2020-11-25 RX ADMIN — OLANZAPINE 10 MG: 5 TABLET, ORALLY DISINTEGRATING ORAL at 12:29

## 2020-11-25 RX ADMIN — ASPIRIN 81 MG: 81 TABLET, COATED ORAL at 08:40

## 2020-11-25 RX ADMIN — ATORVASTATIN CALCIUM 80 MG: 40 TABLET, FILM COATED ORAL at 21:13

## 2020-11-25 RX ADMIN — SALMETEROL XINAFOATE 1 PUFF: 50 POWDER, METERED ORAL; RESPIRATORY (INHALATION) at 21:16

## 2020-11-25 RX ADMIN — VENLAFAXINE HYDROCHLORIDE 75 MG: 75 CAPSULE, EXTENDED RELEASE ORAL at 08:40

## 2020-11-25 RX ADMIN — Medication 0.25 MG: at 21:13

## 2020-11-25 RX ADMIN — HALOPERIDOL 5 MG: 5 TABLET ORAL at 17:08

## 2020-11-25 RX ADMIN — MEMANTINE 5 MG: 5 TABLET ORAL at 08:40

## 2020-11-25 RX ADMIN — POLYETHYLENE GLYCOL 3350 17 G: 17 POWDER, FOR SOLUTION ORAL at 08:40

## 2020-11-25 ASSESSMENT — ACTIVITIES OF DAILY LIVING (ADL)
ADLS_ACUITY_SCORE: 13

## 2020-11-25 NOTE — PROGRESS NOTES
[facility]    Medicine Progress Note - Hospitalist Service       Date of Admission:  9/9/2020  Assessment & Plan       John Juárez is a 54 year old male admitted on 9/9/2020.  PMHx of schizophrenia, hx of TBI, alcohol use disorder, COPD, hyperlipidemia and hypothyroidism who was admitted on 9/9/2020 for evaluation of aggressive behavior at his group home. His medical condition remains stable, though his group home is now unwilling to take him back. Hospitalization has been prolonged while arranging for new placement.      Neurocognitive disorder dt hx of TBI and alcohol use disorder  Schizophrenia  Had been residing in group home prior to admission.  Brought to hospital for evaluation of aggressive behaviors. Group home now unwilling to take him back. Has had numerous CODE 21s called this stay. Seen by psych, meds adjusted. Is currently under civil commitment and court hold through [facility] until 7/31/21.  Patient has been undergoing paper work for funding and placement.  -- Patient has been last evaluated by psych on September 29th , no recent change in med's   --.Patient has been stable on current meds  -- Currently patient is on Cogentin 0.25 mg twice daily, Haldol 7 mg twice daily, Namenda 5 mg daily and Effexor 75 mg p.o. daily, continue current meds   -- PRN zyprexa and haldol available for agitation . Occasionally requires PRNS .  -- per social workers: Celia Layne, [phone][facility] .She has requested [facility] to submit their funding request, still awaiting funding  Once MA and [facility] services are approved patient will be able to move the the [facility]     Hyperlipidemia  -- Chronic and stable on aspirin and statin     COPD; Not O2 dependent.  -- Stable on salmeterol     Hypothyroidism  --Chronic and stable on levothyroxine     Tobacco Use  --Using nicotine patch and prn gum          Diet: Room  Service  Combination Diet Regular Diet Adult; Safe Tray - NO utensils    DVT Prophylaxis: Pneumatic Compression Devices  Valentin Catheter: not present  Code Status: Full Code           Disposition Plan   Expected discharge: TBD, recommended to new group home once funding obtained.  Entered: Jose Isabel DO 11/25/2020, 10:28 AM       The patient's care was discussed with the Bedside Nurse, Care Coordinator/ and Patient.    Jose Isabel DO  Hospitalist Service  [facility]  Contact information available via [facility] Paging/Directory    ______________________________________________________________________    Interval History   Patient seen and examined.  No acute events over night.  Sleeping on my evaluation today.      Data reviewed today: I reviewed all medications, new labs and imaging results over the last 24 hours. I personally reviewed no images or EKG's today.    Physical Exam   Vital Signs: Temp: 95.9  F (35.5  C) Temp src: Oral BP: 100/80 Pulse: 84   Resp: 16 SpO2: 97 % O2 Device: None (Room air)    Weight: 156 lbs 8 oz  General Appearance: Resting comfortably and sleeping  Respiratory: Did not auscultate but not in respiratory distress  Cardiovascular: Appears to be be perfusing well   GI: Non-distended  Skin: No obvious rashes or cyanosis to exposed skin   Other: No edema     Data   No lab results found in last 7 days.    No results found for this or any previous visit (from the past 24 hour(s)).

## 2020-11-25 NOTE — PLAN OF CARE
Alert, oriented to self, place intermittently. Medically stable, MI civil commitment in place. Withdrawn w/ flat affect, calm and sleepy for most of shift. Does become agitated at times r/t prolonged hospital stay; PO olanzapine, haldol PRN administered with relief. Reg diet, good appetite. IND in room, bed alarm in place. Pt does not utilize call light, but will open door. Plan: Provide diversion/reassurance/PRN for agitation as needed. Discharge pending MA funding approval.

## 2020-11-25 NOTE — PLAN OF CARE
Pt alert to self/place. Calm and redirectable this shift. Denied pain. Regular diet. No PIV. Nicotine patch in place. Up independently in room. Door alarm in place. Discharge pending funding/placement.

## 2020-11-26 PROCEDURE — 250N000013 HC RX MED GY IP 250 OP 250 PS 637: Performed by: INTERNAL MEDICINE

## 2020-11-26 PROCEDURE — 120N000001 HC R&B MED SURG/OB

## 2020-11-26 PROCEDURE — 250N000013 HC RX MED GY IP 250 OP 250 PS 637: Performed by: HOSPITALIST

## 2020-11-26 PROCEDURE — 250N000013 HC RX MED GY IP 250 OP 250 PS 637: Performed by: NURSE PRACTITIONER

## 2020-11-26 PROCEDURE — 99231 SBSQ HOSP IP/OBS SF/LOW 25: CPT | Performed by: INTERNAL MEDICINE

## 2020-11-26 PROCEDURE — 250N000013 HC RX MED GY IP 250 OP 250 PS 637: Performed by: PSYCHIATRY & NEUROLOGY

## 2020-11-26 RX ADMIN — DOCUSATE SODIUM 100 MG: 100 CAPSULE, LIQUID FILLED ORAL at 20:13

## 2020-11-26 RX ADMIN — HALOPERIDOL 7 MG: 5 TABLET ORAL at 08:56

## 2020-11-26 RX ADMIN — MEMANTINE 5 MG: 5 TABLET ORAL at 08:56

## 2020-11-26 RX ADMIN — ATORVASTATIN CALCIUM 80 MG: 40 TABLET, FILM COATED ORAL at 20:13

## 2020-11-26 RX ADMIN — DOCUSATE SODIUM 100 MG: 100 CAPSULE, LIQUID FILLED ORAL at 08:56

## 2020-11-26 RX ADMIN — POLYETHYLENE GLYCOL 3350 17 G: 17 POWDER, FOR SOLUTION ORAL at 08:56

## 2020-11-26 RX ADMIN — SALMETEROL XINAFOATE 1 PUFF: 50 POWDER, METERED ORAL; RESPIRATORY (INHALATION) at 20:13

## 2020-11-26 RX ADMIN — ASPIRIN 81 MG: 81 TABLET, COATED ORAL at 08:56

## 2020-11-26 RX ADMIN — Medication 0.25 MG: at 20:13

## 2020-11-26 RX ADMIN — Medication 0.25 MG: at 08:56

## 2020-11-26 RX ADMIN — NICOTINE 1 PATCH: 21 PATCH, EXTENDED RELEASE TRANSDERMAL at 08:56

## 2020-11-26 RX ADMIN — LEVOTHYROXINE SODIUM 88 MCG: 88 TABLET ORAL at 08:56

## 2020-11-26 RX ADMIN — VENLAFAXINE HYDROCHLORIDE 75 MG: 75 CAPSULE, EXTENDED RELEASE ORAL at 08:56

## 2020-11-26 RX ADMIN — OLANZAPINE 10 MG: 5 TABLET, ORALLY DISINTEGRATING ORAL at 05:05

## 2020-11-26 RX ADMIN — HALOPERIDOL 7 MG: 5 TABLET ORAL at 20:13

## 2020-11-26 ASSESSMENT — ACTIVITIES OF DAILY LIVING (ADL)
ADLS_ACUITY_SCORE: 13

## 2020-11-26 NOTE — PLAN OF CARE
A&Ox self and place at times.  VSS.  Independent in room. Door alarm.  Nicotine patch in place.  Restless at times but redirectable.

## 2020-11-26 NOTE — PLAN OF CARE
A&O to self and place at times. Flat and withdrawn, calm and cooperative. No PRN meds needed this morning. Sched Halodol given. VS done once daily. Ind in room. Door alarm in place. Reg diet, order for pt. Denies pain. No PIV, MD aware. Nicotine patch in place, R arm. Discharge pending placement & funding.

## 2020-11-26 NOTE — PROGRESS NOTES
[facility]    Medicine Progress Note - Hospitalist Service       Date of Admission:  9/9/2020  Assessment & Plan       John Juárez is a 54 year old male admitted on 9/9/2020.  PMHx of schizophrenia, hx of TBI, alcohol use disorder, COPD, hyperlipidemia and hypothyroidism who was admitted on 9/9/2020 for evaluation of aggressive behavior at his group home. His medical condition remains stable, though his group home is now unwilling to take him back. Hospitalization has been prolonged while arranging for new placement.      Neurocognitive disorder dt hx of TBI and alcohol use disorder  Schizophrenia  Had been residing in group home prior to admission.  Brought to hospital for evaluation of aggressive behaviors. Group home now unwilling to take him back. Has had numerous CODE 21s called this stay. Seen by psych, meds adjusted. Is currently under civil commitment and court hold through [facility] until 7/31/21.  Patient has been undergoing paper work for funding and placement.  -- Patient has been last evaluated by psych on September 29th , no recent change in med's   --.Patient has been stable on current meds  -- Currently patient is on Cogentin 0.25 mg twice daily, Haldol 7 mg twice daily, Namenda 5 mg daily and Effexor 75 mg p.o. daily, continue current meds   -- PRN zyprexa and haldol available for agitation . Occasionally requires PRNS .  -- per social workers: Celia Layne, [phone], is the [facility] .She has requested [facility] to submit their funding request, still awaiting funding  Once MA and [facility] services are approved patient will be able to move the the [facility]     Hyperlipidemia  -- Chronic and stable on aspirin and statin     COPD; Not O2 dependent.  -- Stable on salmeterol     Hypothyroidism  --Chronic and stable on levothyroxine     Tobacco Use  --Using nicotine patch and prn gum          Diet: Room  Service  Combination Diet Regular Diet Adult; Safe Tray - NO utensils    DVT Prophylaxis: Pneumatic Compression Devices  Valentin Catheter: not present  Code Status: Full Code           Disposition Plan   Expected discharge: TBD, recommended to new group home once funding obtained.  Entered: Jose Isabel DO 11/26/2020, 10:28 AM       The patient's care was discussed with the Bedside Nurse, Care Coordinator/ and Patient.    Jose Isabel DO  Hospitalist Service  [facility]  Contact information available via [facility] Paging/Directory    ______________________________________________________________________    Interval History   Patient seen and examined.  No acute events over night.  Sleeping on my evaluation today.      Data reviewed today: I reviewed all medications, new labs and imaging results over the last 24 hours. I personally reviewed no images or EKG's today.    Physical Exam   Vital Signs: Temp: 95.9  F (35.5  C) Temp src: Oral BP: 100/80 Pulse: 84   Resp: 16 SpO2: 97 % O2 Device: None (Room air)    Weight: 156 lbs 8 oz  General Appearance: Resting comfortably and sleeping  Respiratory: Did not auscultate but not in respiratory distress  Cardiovascular: Appears to be be perfusing well   GI: Non-distended  Skin: No obvious rashes or cyanosis to exposed skin   Other: No edema     Data   No lab results found in last 7 days.    No results found for this or any previous visit (from the past 24 hour(s)).

## 2020-11-26 NOTE — PLAN OF CARE
Alert to self & place - anxious & restless at end of shift, PRN Zyprexa. No VS, only done once a day. Denies pain. Regular diet. No MD DEREK aware. Nicotine patch in place. IND in room. Door alarm in place. Discharge pending placement & funding.

## 2020-11-27 PROCEDURE — 250N000013 HC RX MED GY IP 250 OP 250 PS 637: Performed by: PSYCHIATRY & NEUROLOGY

## 2020-11-27 PROCEDURE — 250N000013 HC RX MED GY IP 250 OP 250 PS 637: Performed by: NURSE PRACTITIONER

## 2020-11-27 PROCEDURE — 120N000001 HC R&B MED SURG/OB

## 2020-11-27 PROCEDURE — 250N000013 HC RX MED GY IP 250 OP 250 PS 637: Performed by: HOSPITALIST

## 2020-11-27 PROCEDURE — 250N000013 HC RX MED GY IP 250 OP 250 PS 637: Performed by: INTERNAL MEDICINE

## 2020-11-27 PROCEDURE — 99232 SBSQ HOSP IP/OBS MODERATE 35: CPT | Performed by: HOSPITALIST

## 2020-11-27 RX ADMIN — SALMETEROL XINAFOATE 1 PUFF: 50 POWDER, METERED ORAL; RESPIRATORY (INHALATION) at 08:37

## 2020-11-27 RX ADMIN — Medication 0.25 MG: at 08:36

## 2020-11-27 RX ADMIN — Medication 0.25 MG: at 21:19

## 2020-11-27 RX ADMIN — HALOPERIDOL 7 MG: 5 TABLET ORAL at 21:19

## 2020-11-27 RX ADMIN — POLYETHYLENE GLYCOL 3350 17 G: 17 POWDER, FOR SOLUTION ORAL at 08:36

## 2020-11-27 RX ADMIN — ASPIRIN 81 MG: 81 TABLET, COATED ORAL at 08:36

## 2020-11-27 RX ADMIN — HALOPERIDOL 7 MG: 5 TABLET ORAL at 08:36

## 2020-11-27 RX ADMIN — MEMANTINE 5 MG: 5 TABLET ORAL at 08:36

## 2020-11-27 RX ADMIN — VENLAFAXINE HYDROCHLORIDE 75 MG: 75 CAPSULE, EXTENDED RELEASE ORAL at 08:36

## 2020-11-27 RX ADMIN — SALMETEROL XINAFOATE 1 PUFF: 50 POWDER, METERED ORAL; RESPIRATORY (INHALATION) at 21:20

## 2020-11-27 RX ADMIN — DOCUSATE SODIUM 100 MG: 100 CAPSULE, LIQUID FILLED ORAL at 21:19

## 2020-11-27 RX ADMIN — LEVOTHYROXINE SODIUM 88 MCG: 88 TABLET ORAL at 08:36

## 2020-11-27 RX ADMIN — ATORVASTATIN CALCIUM 80 MG: 40 TABLET, FILM COATED ORAL at 21:19

## 2020-11-27 RX ADMIN — DOCUSATE SODIUM 100 MG: 100 CAPSULE, LIQUID FILLED ORAL at 08:36

## 2020-11-27 ASSESSMENT — ACTIVITIES OF DAILY LIVING (ADL)
ADLS_ACUITY_SCORE: 13

## 2020-11-27 NOTE — PLAN OF CARE
Alert to self & place - withdrawn, but calm & cooperative. No PRN meds needed. VS done once daily. Denies pain. IND in room. Door alarm in place. Regular diet, need to order for pt. No PIV - MD aware. Nicotine patch in place. Discharge pending placement & funding.

## 2020-11-27 NOTE — PLAN OF CARE
Alert to self, and place at times. Calm/cooperative, no PRNs given. VSS on RA. Denies pain. Ind in room. Door alarm on. Regular diet, needs all meals ordered - lunch/dinner have been ordered for today. Declined nicotine patch. No IV access. Discharge pending placement.

## 2020-11-27 NOTE — PROGRESS NOTES
Care Management Follow Up    Length of Stay (days): 69    Expected Discharge Date: (pending funding approval for TBI services)     Concerns to be Addressed: discharge planning     Patient plan of care discussed at interdisciplinary rounds: Yes    Anticipated Discharge Disposition: Group Home     Anticipated Discharge Services:    Anticipated Discharge DME: None    Patient/family educated on Medicare website which has current facility and service quality ratings:    Education Provided on the Discharge Plan:    Patient/Family in Agreement with the Plan:      Referrals Placed by CM/SW: Post Acute Facilities  Private pay costs discussed: Not applicable    Additional Information:  [facility] asking [facility].  She is out of the office today. Writer expects an update from Ms Layne on Monday      BETTYE Castillo

## 2020-11-27 NOTE — PROGRESS NOTES
[facility]    Medicine Progress Note - Hospitalist Service       Date of Admission:  9/9/2020  Assessment & Plan       John Juárez is a 54 year old male admitted on 9/9/2020.  PMHx of schizophrenia, hx of TBI, alcohol use disorder, COPD, hyperlipidemia and hypothyroidism who was admitted on 9/9/2020 for evaluation of aggressive behavior at his group home. His medical condition remains stable, though his group home is now unwilling to take him back. Hospitalization has been prolonged while arranging for new placement.      Neurocognitive disorder dt hx of TBI and alcohol use disorder  Schizophrenia  Had been residing in group home prior to admission.  Brought to hospital for evaluation of aggressive behaviors. Group home now unwilling to take him back. Has had numerous CODE 21s called this stay. Seen by psych, meds adjusted. Is currently under civil commitment and court hold through [facility] until 7/31/21.  Patient has been undergoing paper work for funding and placement.  -- Patient has been last evaluated by psych on September 29th , no recent change in med's   --.Patient has been stable on current meds  -- Currently patient is on Cogentin 0.25 mg twice daily, Haldol 7 mg twice daily, Namenda 5 mg daily and Effexor 75 mg p.o. daily, continue current meds   -- PRN zyprexa and haldol available for agitation . Occasionally requires PRNS .  -- per social workers: Celia Layne, [phone], is the [facility] .She has requested [facility] to submit their funding request, still awaiting funding  Once MA and [facility] services are approved patient will be able to move the the [facility]     Hyperlipidemia  -- Chronic and stable on aspirin and statin     COPD; Not O2 dependent.  -- Stable on salmeterol     Hypothyroidism  --Chronic and stable on levothyroxine     Tobacco Use  --Using nicotine patch and prn gum          Diet: Room  Service  Combination Diet Regular Diet Adult; Safe Tray - NO utensils    DVT Prophylaxis: Pneumatic Compression Devices  Valentin Catheter: not present  Code Status: Full Code           Disposition Plan   Expected discharge: TBD, recommended to new group home once funding obtained.  Entered: Bora Walls DO 11/27/2020, 12:25 PM       The patient's care was discussed with the Bedside Nurse, Care Coordinator/ and Patient.    Bora Walls DO  Hospitalist Service  [facility]  Contact information available via [facility] Paging/Directory    ______________________________________________________________________    Interval History   Patient seen and examined.  No acute events over night.  Denies complaints.    Data reviewed today: I reviewed all medications, new labs and imaging results over the last 24 hours. I personally reviewed no images or EKG's today.    Physical Exam   Vital Signs: Temp: 96.6  F (35.9  C) Temp src: Oral BP: 109/74 Pulse: 78   Resp: 13 SpO2: 96 % O2 Device: None (Room air)    Weight: 156 lbs 8 oz  General Appearance: Resting comfortably and sleeping  Respiratory: Did not auscultate but not in respiratory distress  Cardiovascular: Appears to be be perfusing well   GI: Non-distended  Skin: No obvious rashes or cyanosis to exposed skin   Other: No edema     Data   No lab results found in last 7 days.    No results found for this or any previous visit (from the past 24 hour(s)).

## 2020-11-28 PROCEDURE — 250N000013 HC RX MED GY IP 250 OP 250 PS 637: Performed by: INTERNAL MEDICINE

## 2020-11-28 PROCEDURE — 250N000013 HC RX MED GY IP 250 OP 250 PS 637: Performed by: HOSPITALIST

## 2020-11-28 PROCEDURE — 99231 SBSQ HOSP IP/OBS SF/LOW 25: CPT | Performed by: HOSPITALIST

## 2020-11-28 PROCEDURE — 120N000001 HC R&B MED SURG/OB

## 2020-11-28 PROCEDURE — 250N000013 HC RX MED GY IP 250 OP 250 PS 637: Performed by: PSYCHIATRY & NEUROLOGY

## 2020-11-28 RX ADMIN — HALOPERIDOL 7 MG: 5 TABLET ORAL at 09:37

## 2020-11-28 RX ADMIN — DOCUSATE SODIUM 100 MG: 100 CAPSULE, LIQUID FILLED ORAL at 09:36

## 2020-11-28 RX ADMIN — ASPIRIN 81 MG: 81 TABLET, COATED ORAL at 09:36

## 2020-11-28 RX ADMIN — HALOPERIDOL 5 MG: 5 TABLET ORAL at 15:25

## 2020-11-28 RX ADMIN — SALMETEROL XINAFOATE 1 PUFF: 50 POWDER, METERED ORAL; RESPIRATORY (INHALATION) at 20:55

## 2020-11-28 RX ADMIN — MEMANTINE 5 MG: 5 TABLET ORAL at 09:36

## 2020-11-28 RX ADMIN — VENLAFAXINE HYDROCHLORIDE 75 MG: 75 CAPSULE, EXTENDED RELEASE ORAL at 09:36

## 2020-11-28 RX ADMIN — DOCUSATE SODIUM 100 MG: 100 CAPSULE, LIQUID FILLED ORAL at 20:55

## 2020-11-28 RX ADMIN — SALMETEROL XINAFOATE 1 PUFF: 50 POWDER, METERED ORAL; RESPIRATORY (INHALATION) at 12:06

## 2020-11-28 RX ADMIN — ATORVASTATIN CALCIUM 80 MG: 40 TABLET, FILM COATED ORAL at 20:55

## 2020-11-28 RX ADMIN — Medication 0.25 MG: at 20:55

## 2020-11-28 RX ADMIN — LEVOTHYROXINE SODIUM 88 MCG: 88 TABLET ORAL at 09:36

## 2020-11-28 RX ADMIN — HALOPERIDOL 7 MG: 5 TABLET ORAL at 20:55

## 2020-11-28 RX ADMIN — Medication 0.25 MG: at 09:37

## 2020-11-28 ASSESSMENT — ACTIVITIES OF DAILY LIVING (ADL)
ADLS_ACUITY_SCORE: 13

## 2020-11-28 NOTE — PLAN OF CARE
Patient is alert to self. Vital signs daily. Denies pain. Up independently in room. Door alarm active. Elopement precautions maintained. Awaiting placement.

## 2020-11-28 NOTE — PROGRESS NOTES
[facility]    Medicine Progress Note - Hospitalist Service       Date of Admission:  9/9/2020  Assessment & Plan       John Juárez is a 54 year old male admitted on 9/9/2020.  PMHx of schizophrenia, hx of TBI, alcohol use disorder, COPD, hyperlipidemia and hypothyroidism who was admitted on 9/9/2020 for evaluation of aggressive behavior at his group home. His medical condition remains stable, though his group home is now unwilling to take him back. Hospitalization has been prolonged while arranging for new placement.      Neurocognitive disorder dt hx of TBI and alcohol use disorder  Schizophrenia  Had been residing in group home prior to admission.  Brought to hospital for evaluation of aggressive behaviors. Group home now unwilling to take him back. Has had numerous CODE 21s called this stay. Seen by psych, meds adjusted. Is currently under civil commitment and court hold through [facility] until 7/31/21.  Patient has been undergoing paper work for funding and placement.  -- Patient has been last evaluated by psych on September 29th , no recent change in med's   --.Patient has been stable on current meds  -- Currently patient is on Cogentin 0.25 mg twice daily, Haldol 7 mg twice daily, Namenda 5 mg daily and Effexor 75 mg p.o. daily, continue current meds   -- PRN zyprexa and haldol available for agitation . Occasionally requires PRNS .  -- per social workers: Celia Layne, [phone], is the [facility] .She has requested [facility] to submit their funding request, still awaiting funding  Once MA and [facility] services are approved patient will be able to move the the [facility]     Hyperlipidemia  -- Chronic and stable on aspirin and statin     COPD; Not O2 dependent.  -- Stable on salmeterol     Hypothyroidism  --Chronic and stable on levothyroxine     Tobacco Use  --Using nicotine patch and prn gum          Diet: Room  Service  Combination Diet Regular Diet Adult; Safe Tray - NO utensils    DVT Prophylaxis: Pneumatic Compression Devices  Valentin Catheter: not present  Code Status: Full Code           Disposition Plan   Expected discharge: TBD, recommended to new group home once funding obtained.  Entered: Bora Walls DO 11/28/2020, 2:33 PM       The patient's care was discussed with the Bedside Nurse, Care Coordinator/ and Patient.    Bora Walls DO  Hospitalist Service  [facility]  Contact information available via [facility] Paging/Directory    ______________________________________________________________________    Interval History   Patient seen and examined.  No acute events over night.  Denies complaints.    Data reviewed today: I reviewed all medications, new labs and imaging results over the last 24 hours. I personally reviewed no images or EKG's today.    Physical Exam   Vital Signs: Temp: 96.7  F (35.9  C) Temp src: Oral BP: 107/68 Pulse: 69   Resp: 13 SpO2: 96 % O2 Device: None (Room air)    Weight: 156 lbs 8 oz  General Appearance: Resting comfortably and sleeping  Respiratory: Did not auscultate but not in respiratory distress  Cardiovascular: Appears to be be perfusing well   GI: Non-distended  Skin: No obvious rashes or cyanosis to exposed skin   Other: No edema     Data   No lab results found in last 7 days.    No results found for this or any previous visit (from the past 24 hour(s)).

## 2020-11-28 NOTE — PLAN OF CARE
Pt alert to self and place at times.  Calm and pleasant this evening, no PRNs given. Denies pain. VSS on RA. Up ind in room. Door alarm on. Nicotine patch not in place. Discharge pending.

## 2020-11-29 PROCEDURE — 99231 SBSQ HOSP IP/OBS SF/LOW 25: CPT | Performed by: HOSPITALIST

## 2020-11-29 PROCEDURE — 120N000001 HC R&B MED SURG/OB

## 2020-11-29 PROCEDURE — 250N000013 HC RX MED GY IP 250 OP 250 PS 637: Performed by: PSYCHIATRY & NEUROLOGY

## 2020-11-29 PROCEDURE — 250N000013 HC RX MED GY IP 250 OP 250 PS 637: Performed by: HOSPITALIST

## 2020-11-29 PROCEDURE — 250N000013 HC RX MED GY IP 250 OP 250 PS 637: Performed by: INTERNAL MEDICINE

## 2020-11-29 RX ADMIN — HALOPERIDOL 7 MG: 5 TABLET ORAL at 09:27

## 2020-11-29 RX ADMIN — DOCUSATE SODIUM 100 MG: 100 CAPSULE, LIQUID FILLED ORAL at 20:55

## 2020-11-29 RX ADMIN — HALOPERIDOL 5 MG: 5 TABLET ORAL at 12:02

## 2020-11-29 RX ADMIN — MEMANTINE 5 MG: 5 TABLET ORAL at 09:27

## 2020-11-29 RX ADMIN — SALMETEROL XINAFOATE 1 PUFF: 50 POWDER, METERED ORAL; RESPIRATORY (INHALATION) at 20:57

## 2020-11-29 RX ADMIN — HALOPERIDOL 7 MG: 5 TABLET ORAL at 20:56

## 2020-11-29 RX ADMIN — SALMETEROL XINAFOATE 1 PUFF: 50 POWDER, METERED ORAL; RESPIRATORY (INHALATION) at 09:26

## 2020-11-29 RX ADMIN — DOCUSATE SODIUM 100 MG: 100 CAPSULE, LIQUID FILLED ORAL at 09:27

## 2020-11-29 RX ADMIN — VENLAFAXINE HYDROCHLORIDE 75 MG: 75 CAPSULE, EXTENDED RELEASE ORAL at 09:27

## 2020-11-29 RX ADMIN — Medication 0.25 MG: at 20:55

## 2020-11-29 RX ADMIN — ATORVASTATIN CALCIUM 80 MG: 40 TABLET, FILM COATED ORAL at 20:55

## 2020-11-29 RX ADMIN — LEVOTHYROXINE SODIUM 88 MCG: 88 TABLET ORAL at 09:28

## 2020-11-29 RX ADMIN — Medication 0.25 MG: at 09:27

## 2020-11-29 RX ADMIN — ASPIRIN 81 MG: 81 TABLET, COATED ORAL at 09:27

## 2020-11-29 ASSESSMENT — ACTIVITIES OF DAILY LIVING (ADL)
ADLS_ACUITY_SCORE: 13

## 2020-11-29 NOTE — PLAN OF CARE
Primary Diagnosis: Dementia with behavioral issues; PMH: schizophrenia, TBI, ETOH abuse, COPD, smoker.  Orientation: Alert to self  Aggression Stop Light: Can be yellow or green.  Mobility: Ind in room, door alarm active.  Pain Management: Denies   Diet: Regular, needs meals ordered. Offer snacks (likes diet arcadio) if appears agitated.  Bowel/Bladder: Continent.  Abnormal Lab/Assessments: No labs, declined nicotine patch today.  Drain/Device/Wound: No IV access.  Consults: JAN  D/C Day/Goals/Place: Pending placement and funding to [facility]. Civil commitment/court hold through [facility] valid until 07/31/2021.

## 2020-11-29 NOTE — PLAN OF CARE
Pt A/O to self. VS daily. Denies pain. Up ind. Door alarm on, elopement precautions in place. Calm and pleasant this evening. No PRNs given. Placement pending.

## 2020-11-29 NOTE — PROGRESS NOTES
[facility]    Medicine Progress Note - Hospitalist Service       Date of Admission:  9/9/2020  Assessment & Plan       John Juárez is a 54 year old male admitted on 9/9/2020.  PMHx of schizophrenia, hx of TBI, alcohol use disorder, COPD, hyperlipidemia and hypothyroidism who was admitted on 9/9/2020 for evaluation of aggressive behavior at his group home. His medical condition remains stable, though his group home is now unwilling to take him back. Hospitalization has been prolonged while arranging for new placement.      Neurocognitive disorder dt hx of TBI and alcohol use disorder  Schizophrenia  Had been residing in group home prior to admission.  Brought to hospital for evaluation of aggressive behaviors. Group home now unwilling to take him back. Has had numerous CODE 21s called this stay. Seen by psych, meds adjusted. Is currently under civil commitment and court hold through [facility] until 7/31/21.  Patient has been undergoing paper work for funding and placement.  -- Patient has been last evaluated by psych on September 29th , no recent change in med's   --.Patient has been stable on current meds  -- Currently patient is on Cogentin 0.25 mg twice daily, Haldol 7 mg twice daily, Namenda 5 mg daily and Effexor 75 mg p.o. daily, continue current meds   -- PRN zyprexa and haldol available for agitation . Occasionally requires PRNS .  -- per social workers: Celia Layne, [phone], is the [facility] .She has requested [facility] to submit their funding request, still awaiting funding  Once MA and [facility] services are approved patient will be able to move the the [facility]     Hyperlipidemia  -- Chronic and stable on aspirin and statin     COPD; Not O2 dependent.  -- Stable on salmeterol     Hypothyroidism  --Chronic and stable on levothyroxine     Tobacco Use  --Using nicotine patch and prn gum          Diet: Room  Service  Combination Diet Regular Diet Adult; Safe Tray - NO utensils    DVT Prophylaxis: Pneumatic Compression Devices  Valentin Catheter: not present  Code Status: Full Code           Disposition Plan   Expected discharge: TBD, recommended to new group home once funding obtained.  Entered: Bora Walls DO 11/29/2020, 4:07 PM       The patient's care was discussed with the Bedside Nurse, Care Coordinator/ and Patient.    Bora Walls DO  Hospitalist Service  [facility]  Contact information available via [facility] Paging/Directory    ______________________________________________________________________    Interval History   Patient seen and examined.  No acute events over night.  Denies complaints. Denies physical exam today    Data reviewed today: I reviewed all medications, new labs and imaging results over the last 24 hours. I personally reviewed no images or EKG's today.    Physical Exam   Vital Signs: Temp: 96.9  F (36.1  C) Temp src: Oral BP: 90/66 Pulse: 76   Resp: 15 SpO2: 97 % O2 Device: None (Room air)    Weight: 156 lbs 8 oz      Data   No lab results found in last 7 days.    No results found for this or any previous visit (from the past 24 hour(s)).

## 2020-11-29 NOTE — PLAN OF CARE
Patient is alert to self. Vital signs daily. Denies pain. Up independently in room. Door alarm active. Elopement precautions maintained. Awaiting placement.  Pt stating this afternoon that he has a ride coming today and that he is leaving.  Redirected, PRN oral haldol given.

## 2020-11-29 NOTE — PLAN OF CARE
Pt doing well this am.  Became slightly agitated around noon, swearing, wanting to leave.  Redirected fairly easily,  PRN PO haldol given.  Pt eating/drinking well.  Up in room ind.  Listening to music and television.

## 2020-11-30 PROCEDURE — 120N000001 HC R&B MED SURG/OB

## 2020-11-30 PROCEDURE — 250N000013 HC RX MED GY IP 250 OP 250 PS 637: Performed by: HOSPITALIST

## 2020-11-30 PROCEDURE — 99231 SBSQ HOSP IP/OBS SF/LOW 25: CPT | Performed by: HOSPITALIST

## 2020-11-30 PROCEDURE — 250N000013 HC RX MED GY IP 250 OP 250 PS 637: Performed by: INTERNAL MEDICINE

## 2020-11-30 PROCEDURE — 250N000013 HC RX MED GY IP 250 OP 250 PS 637: Performed by: PSYCHIATRY & NEUROLOGY

## 2020-11-30 RX ADMIN — ATORVASTATIN CALCIUM 80 MG: 40 TABLET, FILM COATED ORAL at 20:52

## 2020-11-30 RX ADMIN — VENLAFAXINE HYDROCHLORIDE 75 MG: 75 CAPSULE, EXTENDED RELEASE ORAL at 09:41

## 2020-11-30 RX ADMIN — Medication 0.25 MG: at 20:52

## 2020-11-30 RX ADMIN — Medication 0.25 MG: at 09:41

## 2020-11-30 RX ADMIN — DOCUSATE SODIUM 100 MG: 100 CAPSULE, LIQUID FILLED ORAL at 20:52

## 2020-11-30 RX ADMIN — HALOPERIDOL 7 MG: 5 TABLET ORAL at 20:52

## 2020-11-30 RX ADMIN — SALMETEROL XINAFOATE 1 PUFF: 50 POWDER, METERED ORAL; RESPIRATORY (INHALATION) at 11:29

## 2020-11-30 RX ADMIN — SALMETEROL XINAFOATE 1 PUFF: 50 POWDER, METERED ORAL; RESPIRATORY (INHALATION) at 21:54

## 2020-11-30 RX ADMIN — LEVOTHYROXINE SODIUM 88 MCG: 88 TABLET ORAL at 09:41

## 2020-11-30 RX ADMIN — ASPIRIN 81 MG: 81 TABLET, COATED ORAL at 09:41

## 2020-11-30 RX ADMIN — HALOPERIDOL 7 MG: 5 TABLET ORAL at 09:42

## 2020-11-30 RX ADMIN — MEMANTINE 5 MG: 5 TABLET ORAL at 09:42

## 2020-11-30 ASSESSMENT — ACTIVITIES OF DAILY LIVING (ADL)
ADLS_ACUITY_SCORE: 13

## 2020-11-30 NOTE — PROGRESS NOTES
[facility]    Medicine Progress Note - Hospitalist Service       Date of Admission:  9/9/2020  Assessment & Plan       John Juárez is a 54 year old male admitted on 9/9/2020.  PMHx of schizophrenia, hx of TBI, alcohol use disorder, COPD, hyperlipidemia and hypothyroidism who was admitted on 9/9/2020 for evaluation of aggressive behavior at his group home. His medical condition remains stable, though his group home is now unwilling to take him back. Hospitalization has been prolonged while arranging for new placement.      Neurocognitive disorder dt hx of TBI and alcohol use disorder  Schizophrenia  Had been residing in group home prior to admission.  Brought to hospital for evaluation of aggressive behaviors. Group home now unwilling to take him back. Has had numerous CODE 21s called this stay. Seen by psych, meds adjusted. Is currently under civil commitment and court hold through [facility] until 7/31/21.  Patient has been undergoing paper work for funding and placement.  -- Patient has been last evaluated by psych on September 29th , no recent change in med's   --.Patient has been stable on current meds  -- Currently patient is on Cogentin 0.25 mg twice daily, Haldol 7 mg twice daily, Namenda 5 mg daily and Effexor 75 mg p.o. daily, continue current meds   -- PRN zyprexa and haldol available for agitation . Occasionally requires PRNS .  -- per social workers: Celia Layne, [phone], is the [facility] .She has requested [facility] to submit their funding request, still awaiting funding  Once MA and Wavier services are approved patient will be able to move the the [facility]     Hyperlipidemia  -- Chronic and stable on aspirin and statin     COPD; Not O2 dependent.  -- Stable on salmeterol     Hypothyroidism  --Chronic and stable on levothyroxine     Tobacco Use  --Using nicotine patch and prn gum          Diet: Room  Service  Combination Diet Regular Diet Adult; Safe Tray - NO utensils    DVT Prophylaxis: Pneumatic Compression Devices  Valentin Catheter: not present  Code Status: Full Code           Disposition Plan   Expected discharge: TBD, recommended to new group home once funding obtained.  Entered: Bora Walls DO 11/30/2020, 2:50 PM       The patient's care was discussed with the Bedside Nurse, Care Coordinator/ and Patient.    Bora Walls DO  Hospitalist Service  [facility]  Contact information available via [facility] Paging/Directory    ______________________________________________________________________    Interval History   Patient seen and examined.  No acute events over night.  Denies complaints. Denies physical exam today    Data reviewed today: I reviewed all medications, new labs and imaging results over the last 24 hours. I personally reviewed no images or EKG's today.    Physical Exam   Vital Signs: Temp: 97.6  F (36.4  C) Temp src: Oral BP: 100/61 Pulse: 75   Resp: 16 SpO2: 97 % O2 Device: None (Room air)    Weight: 156 lbs 8 oz      Data   No lab results found in last 7 days.    No results found for this or any previous visit (from the past 24 hour(s)).

## 2020-11-30 NOTE — PLAN OF CARE
Pt calm/cooperative on shift. No prns needed. Denies pain. Up indep in room. Door alarm present. Discharge pending placement.

## 2020-11-30 NOTE — PROGRESS NOTES
Care Management Follow Up    Length of Stay (days): 72    Expected Discharge Date: (pending funding approval for TBI services)     Concerns to be Addressed: discharge planning     Patient plan of care discussed at interdisciplinary rounds: Yes    Anticipated Discharge Disposition: Group Home     Anticipated Discharge Services:    Anticipated Discharge DME: None    Patient/family educated on Medicare website which has current facility and service quality ratings:    Education Provided on the Discharge Plan:    Patient/Family in Agreement with the Plan:      Referrals Placed by CM/SW: Post Acute Facilities  Private pay costs discussed: not applicable    Additional Information:  Krai Layne, the TBI Wavier  has sent the funding she has approved for patient to [facility]. If [facility] accepts the funding offered then Ms Layne the last steps of the process will take several days.  Ms Layne will update writer once she hears back from [facility]      ASIM CastilloSW

## 2020-12-01 PROCEDURE — 250N000013 HC RX MED GY IP 250 OP 250 PS 637: Performed by: HOSPITALIST

## 2020-12-01 PROCEDURE — 250N000013 HC RX MED GY IP 250 OP 250 PS 637: Performed by: PSYCHIATRY & NEUROLOGY

## 2020-12-01 PROCEDURE — 120N000001 HC R&B MED SURG/OB

## 2020-12-01 PROCEDURE — 250N000013 HC RX MED GY IP 250 OP 250 PS 637: Performed by: INTERNAL MEDICINE

## 2020-12-01 PROCEDURE — 99231 SBSQ HOSP IP/OBS SF/LOW 25: CPT | Performed by: HOSPITALIST

## 2020-12-01 RX ADMIN — SALMETEROL XINAFOATE 1 PUFF: 50 POWDER, METERED ORAL; RESPIRATORY (INHALATION) at 20:48

## 2020-12-01 RX ADMIN — DOCUSATE SODIUM 100 MG: 100 CAPSULE, LIQUID FILLED ORAL at 09:05

## 2020-12-01 RX ADMIN — MEMANTINE 5 MG: 5 TABLET ORAL at 09:05

## 2020-12-01 RX ADMIN — DOCUSATE SODIUM 100 MG: 100 CAPSULE, LIQUID FILLED ORAL at 20:48

## 2020-12-01 RX ADMIN — VENLAFAXINE HYDROCHLORIDE 75 MG: 75 CAPSULE, EXTENDED RELEASE ORAL at 09:05

## 2020-12-01 RX ADMIN — Medication 0.25 MG: at 20:48

## 2020-12-01 RX ADMIN — HALOPERIDOL 7 MG: 5 TABLET ORAL at 09:05

## 2020-12-01 RX ADMIN — HALOPERIDOL 7 MG: 5 TABLET ORAL at 20:48

## 2020-12-01 RX ADMIN — Medication 0.25 MG: at 09:05

## 2020-12-01 RX ADMIN — LEVOTHYROXINE SODIUM 88 MCG: 88 TABLET ORAL at 09:05

## 2020-12-01 RX ADMIN — ASPIRIN 81 MG: 81 TABLET, COATED ORAL at 09:05

## 2020-12-01 RX ADMIN — ATORVASTATIN CALCIUM 80 MG: 40 TABLET, FILM COATED ORAL at 20:48

## 2020-12-01 RX ADMIN — SALMETEROL XINAFOATE 1 PUFF: 50 POWDER, METERED ORAL; RESPIRATORY (INHALATION) at 09:07

## 2020-12-01 ASSESSMENT — ACTIVITIES OF DAILY LIVING (ADL)
ADLS_ACUITY_SCORE: 13

## 2020-12-01 NOTE — PROGRESS NOTES
Care Management Follow Up    Length of Stay (days): 73    Expected Discharge Date: (pending funding approval for TBI services)     Concerns to be Addressed: discharge planning     Patient plan of care discussed at interdisciplinary rounds: Yes    Anticipated Discharge Disposition: Group Home     Anticipated Discharge Services:    Anticipated Discharge DME: None    Patient/family educated on Medicare website which has current facility and service quality ratings:    Education Provided on the Discharge Plan:    Patient/Family in Agreement with the Plan:      Referrals Placed by CM/SW: Post Acute Facilities  Private pay costs discussed: Not applicable    Additional Information:  Approval of funding for patient at [facility] is progressing.  If funding is in place by Friday then Nnenna, the Clinical Director at [facility] will accept patient Monday December 7th.    Call placed to the behavioral  Kurtis Chapin at [phone] regarding possible admission of patient to [facility] next Monday.      ASIM CastilloSW

## 2020-12-01 NOTE — PLAN OF CARE
Disoriented to situation. Flat affect. Denies pain. Up independently. Regular diet, good appetite. No IV access. Door alarm activated. No prns needed for agitation. Discharge pending placement. SW following.

## 2020-12-01 NOTE — PLAN OF CARE
Pt flat, cooperative.  Vitally stable.  Regular diet.  Up ind.  No prn's needed for agitation.  Continue to monitor will awaiting placement.

## 2020-12-01 NOTE — PROGRESS NOTES
[facility]    Medicine Progress Note - Hospitalist Service       Date of Admission:  9/9/2020  Assessment & Plan       John Juárez is a 54 year old male admitted on 9/9/2020.  PMHx of schizophrenia, hx of TBI, alcohol use disorder, COPD, hyperlipidemia and hypothyroidism who was admitted on 9/9/2020 for evaluation of aggressive behavior at his group home. His medical condition remains stable, though his group home is now unwilling to take him back. Hospitalization has been prolonged while arranging for new placement.      Neurocognitive disorder dt hx of TBI and alcohol use disorder  Schizophrenia  Had been residing in group home prior to admission.  Brought to hospital for evaluation of aggressive behaviors. Group home now unwilling to take him back. Has had numerous CODE 21s called this stay. Seen by psych, meds adjusted. Is currently under civil commitment and court hold through [facility] until 7/31/21.  Patient has been undergoing paper work for funding and placement.  -- Patient has been last evaluated by psych on September 29th , no recent change in med's   --.Patient has been stable on current meds  -- Currently patient is on Cogentin 0.25 mg twice daily, Haldol 7 mg twice daily, Namenda 5 mg daily and Effexor 75 mg p.o. daily, continue current meds   -- PRN zyprexa and haldol available for agitation . Occasionally requires PRNS .  -- per social workers: Celia Layne, [phone], is the [facility] .She has requested [facility] to submit their funding request, still awaiting funding  Once MA and [facility]avier services are approved patient will be able to move the the [facility]     Hyperlipidemia  -- Chronic and stable on aspirin and statin     COPD; Not O2 dependent.  -- Stable on salmeterol     Hypothyroidism  --Chronic and stable on levothyroxine     Tobacco Use  --Using nicotine patch and prn gum          Diet: Room  Service  Combination Diet Regular Diet Adult; Safe Tray - NO utensils    DVT Prophylaxis: Pneumatic Compression Devices  Valentin Catheter: not present  Code Status: Full Code           Disposition Plan   Expected discharge: TBD, recommended to new group home once funding obtained.  Entered: Ashtyn Barrientos MD 12/01/2020, 11:38 AM       The patient's care was discussed with the Bedside Nurse, Care Coordinator/ and Patient.    Ashtyn Barrientos MD  Hospitalist Service  [facility]  Contact information available via [facility] Paging/Directory    ______________________________________________________________________    Interval History   No acute events over night.  Denies complaints. Stated he was hungry and requested some food.    Data reviewed today: I reviewed all medications, new labs and imaging results over the last 24 hours. I personally reviewed no images or EKG's today.    Physical Exam   Vital Signs: Temp: 96.2  F (35.7  C) Temp src: Oral BP: 101/61 Pulse: 70   Resp: 16 SpO2: 97 % O2 Device: None (Room air)    Weight: 156 lbs 8 oz  General Appearance:  Resting comfortably, alert, no distress  Respiratory: Did not auscultate but not in respiratory distress  Cardiovascular:   GI: Non-distended  Skin: No obvious rashes or cyanosis to exposed skin   Other: Calm    Data   No lab results found in last 7 days.    No results found for this or any previous visit (from the past 24 hour(s)).

## 2020-12-02 PROCEDURE — 99231 SBSQ HOSP IP/OBS SF/LOW 25: CPT | Performed by: HOSPITALIST

## 2020-12-02 PROCEDURE — 250N000013 HC RX MED GY IP 250 OP 250 PS 637: Performed by: PSYCHIATRY & NEUROLOGY

## 2020-12-02 PROCEDURE — 250N000013 HC RX MED GY IP 250 OP 250 PS 637: Performed by: HOSPITALIST

## 2020-12-02 PROCEDURE — 250N000013 HC RX MED GY IP 250 OP 250 PS 637: Performed by: INTERNAL MEDICINE

## 2020-12-02 PROCEDURE — 120N000001 HC R&B MED SURG/OB

## 2020-12-02 RX ADMIN — Medication 0.25 MG: at 20:53

## 2020-12-02 RX ADMIN — POLYETHYLENE GLYCOL 3350 17 G: 17 POWDER, FOR SOLUTION ORAL at 11:30

## 2020-12-02 RX ADMIN — HALOPERIDOL 7 MG: 5 TABLET ORAL at 20:53

## 2020-12-02 RX ADMIN — HALOPERIDOL 7 MG: 5 TABLET ORAL at 11:33

## 2020-12-02 RX ADMIN — ATORVASTATIN CALCIUM 80 MG: 40 TABLET, FILM COATED ORAL at 20:53

## 2020-12-02 RX ADMIN — LEVOTHYROXINE SODIUM 88 MCG: 88 TABLET ORAL at 11:33

## 2020-12-02 RX ADMIN — Medication 0.25 MG: at 11:33

## 2020-12-02 RX ADMIN — MEMANTINE 5 MG: 5 TABLET ORAL at 11:34

## 2020-12-02 RX ADMIN — SALMETEROL XINAFOATE 1 PUFF: 50 POWDER, METERED ORAL; RESPIRATORY (INHALATION) at 09:30

## 2020-12-02 RX ADMIN — ASPIRIN 81 MG: 81 TABLET, COATED ORAL at 11:33

## 2020-12-02 RX ADMIN — VENLAFAXINE HYDROCHLORIDE 75 MG: 75 CAPSULE, EXTENDED RELEASE ORAL at 11:34

## 2020-12-02 RX ADMIN — SALMETEROL XINAFOATE 1 PUFF: 50 POWDER, METERED ORAL; RESPIRATORY (INHALATION) at 20:57

## 2020-12-02 RX ADMIN — DOCUSATE SODIUM 100 MG: 100 CAPSULE, LIQUID FILLED ORAL at 11:33

## 2020-12-02 RX ADMIN — DOCUSATE SODIUM 100 MG: 100 CAPSULE, LIQUID FILLED ORAL at 20:53

## 2020-12-02 ASSESSMENT — ACTIVITIES OF DAILY LIVING (ADL)
ADLS_ACUITY_SCORE: 13

## 2020-12-02 NOTE — PLAN OF CARE
7a-3p shift report  Overall stable with exception of his baseline cognitive impairment. Disorientated to time/situation. Flat affect, approachable and easily redirectable. Has been calm/cooperative this shift, no need for PRN medication. Flat affect. Denies pain. Appetite good. Likes Henry pop.  Door alarm remained activated. SW following.   Discharge pending placement. SW Reportedly If funding is in place by Friday then Clinical Director at [facility] will accept patient this Monday December 7th.

## 2020-12-02 NOTE — PLAN OF CARE
Calm/cooperative this shift, no need for PRN medication, redirectable. Flat affect. Denies pain. Door alarm activated.

## 2020-12-02 NOTE — PROGRESS NOTES
[facility]    Medicine Progress Note - Hospitalist Service       Date of Admission:  9/9/2020  Assessment & Plan       John Juárez is a 54 year old male admitted on 9/9/2020.  PMHx of schizophrenia, hx of TBI, alcohol use disorder, COPD, hyperlipidemia and hypothyroidism who was admitted on 9/9/2020 for evaluation of aggressive behavior at his group home. His medical condition remains stable, though his group home is now unwilling to take him back. Hospitalization has been prolonged while arranging for new placement.      Neurocognitive disorder dt hx of TBI and alcohol use disorder  Schizophrenia  Had been residing in group home prior to admission.  Brought to hospital for evaluation of aggressive behaviors. Group home now unwilling to take him back. Has had numerous CODE 21s called this stay. Seen by psych, meds adjusted. Is currently under civil commitment and court hold through [facility] until 7/31/21.  Patient has been undergoing paper work for funding and placement.  -- Patient has been last evaluated by psych on September 29th , no recent change in med's   --.Patient has been stable on current meds  -- Currently patient is on Cogentin 0.25 mg twice daily, Haldol 7 mg twice daily, Namenda 5 mg daily and Effexor 75 mg p.o. daily, continue current meds   -- PRN zyprexa and haldol available for agitation . Occasionally requires PRNS .  -- per social workers: Celia Layne, [phone], is the [facility] .She has requested [facility] to submit their funding request, still awaiting funding  Once MA and [facility]avier services are approved patient will be able to move the the [facility]     Hyperlipidemia  -- Chronic and stable on aspirin and statin     COPD; Not O2 dependent.  -- Stable on salmeterol     Hypothyroidism  --Chronic and stable on levothyroxine     Tobacco Use  --Using nicotine patch and prn gum     Resting tremors of upper  extremities  --Not clear if this is new.  Will recheck basic lab work tomorrow as it has been over a month since labs were checked.       Diet: Room Service  Combination Diet Regular Diet Adult; Safe Tray - NO utensils    DVT Prophylaxis: Pneumatic Compression Devices  Valentin Catheter: not present  Code Status: Full Code           Disposition Plan   Expected discharge: TBD, recommended to new group home once funding obtained.  Anticipated discharge date is December 7.  Entered: Ashtyn Barrientos MD 12/02/2020, 1:12 PM       The patient's care was discussed with the Bedside Nurse, Care Coordinator/ and Patient.    Ashtyn Barrientos MD  Hospitalist Service  [facility]  Contact information available via [facility] Paging/Directory    ______________________________________________________________________    Interval History   No acute events over night.  Denies complaints.  RN was concerned that patient seemed to have some upper extremity tremors.  This was noted on my exam but not sure that this is new..    Data reviewed today: I reviewed all medications, new labs and imaging results over the last 24 hours. I personally reviewed no images or EKG's today.    Physical Exam   Vital Signs: Temp: 97.2  F (36.2  C) Temp src: Oral BP: 122/75 Pulse: 74   Resp: 16 SpO2: 98 % O2 Device: None (Room air)    Weight: 156 lbs 8 oz  General Appearance:  Resting comfortably, alert, no distress, does have some resting tremors of upper extremity  Respiratory: Did not auscultate but not in respiratory distress  Cardiovascular:   GI: Non-distended  Skin: No obvious rashes or cyanosis to exposed skin   Other: Calm    Data   No lab results found in last 7 days.    No results found for this or any previous visit (from the past 24 hour(s)).

## 2020-12-03 LAB
ALBUMIN SERPL-MCNC: 3.7 G/DL (ref 3.4–5)
ALP SERPL-CCNC: 92 U/L (ref 40–150)
ALT SERPL W P-5'-P-CCNC: 130 U/L (ref 0–70)
ANION GAP SERPL CALCULATED.3IONS-SCNC: 5 MMOL/L (ref 3–14)
AST SERPL W P-5'-P-CCNC: 150 U/L (ref 0–45)
BILIRUB SERPL-MCNC: 0.6 MG/DL (ref 0.2–1.3)
BUN SERPL-MCNC: 18 MG/DL (ref 7–30)
CALCIUM SERPL-MCNC: 8.8 MG/DL (ref 8.5–10.1)
CHLORIDE SERPL-SCNC: 109 MMOL/L (ref 94–109)
CO2 SERPL-SCNC: 24 MMOL/L (ref 20–32)
CREAT SERPL-MCNC: 0.74 MG/DL (ref 0.66–1.25)
ERYTHROCYTE [DISTWIDTH] IN BLOOD BY AUTOMATED COUNT: 14.1 % (ref 10–15)
GFR SERPL CREATININE-BSD FRML MDRD: >90 ML/MIN/{1.73_M2}
GLUCOSE SERPL-MCNC: 95 MG/DL (ref 70–99)
HCT VFR BLD AUTO: 45.9 % (ref 40–53)
HGB BLD-MCNC: 16.4 G/DL (ref 13.3–17.7)
MCH RBC QN AUTO: 32.8 PG (ref 26.5–33)
MCHC RBC AUTO-ENTMCNC: 35.7 G/DL (ref 31.5–36.5)
MCV RBC AUTO: 92 FL (ref 78–100)
PLATELET # BLD AUTO: 179 10E9/L (ref 150–450)
POTASSIUM SERPL-SCNC: 4 MMOL/L (ref 3.4–5.3)
PROT SERPL-MCNC: 8.3 G/DL (ref 6.8–8.8)
RBC # BLD AUTO: 5 10E12/L (ref 4.4–5.9)
SODIUM SERPL-SCNC: 138 MMOL/L (ref 133–144)
WBC # BLD AUTO: 5.4 10E9/L (ref 4–11)

## 2020-12-03 PROCEDURE — 250N000013 HC RX MED GY IP 250 OP 250 PS 637: Performed by: INTERNAL MEDICINE

## 2020-12-03 PROCEDURE — 250N000013 HC RX MED GY IP 250 OP 250 PS 637: Performed by: HOSPITALIST

## 2020-12-03 PROCEDURE — 80053 COMPREHEN METABOLIC PANEL: CPT | Performed by: HOSPITALIST

## 2020-12-03 PROCEDURE — 120N000001 HC R&B MED SURG/OB

## 2020-12-03 PROCEDURE — 36415 COLL VENOUS BLD VENIPUNCTURE: CPT | Performed by: HOSPITALIST

## 2020-12-03 PROCEDURE — 85027 COMPLETE CBC AUTOMATED: CPT | Performed by: HOSPITALIST

## 2020-12-03 PROCEDURE — 250N000013 HC RX MED GY IP 250 OP 250 PS 637: Performed by: PSYCHIATRY & NEUROLOGY

## 2020-12-03 PROCEDURE — 99231 SBSQ HOSP IP/OBS SF/LOW 25: CPT | Performed by: HOSPITALIST

## 2020-12-03 PROCEDURE — 250N000013 HC RX MED GY IP 250 OP 250 PS 637: Performed by: NURSE PRACTITIONER

## 2020-12-03 RX ADMIN — HALOPERIDOL 7 MG: 5 TABLET ORAL at 08:05

## 2020-12-03 RX ADMIN — Medication 0.25 MG: at 21:05

## 2020-12-03 RX ADMIN — LEVOTHYROXINE SODIUM 88 MCG: 88 TABLET ORAL at 08:05

## 2020-12-03 RX ADMIN — SALMETEROL XINAFOATE 1 PUFF: 50 POWDER, METERED ORAL; RESPIRATORY (INHALATION) at 08:10

## 2020-12-03 RX ADMIN — SALMETEROL XINAFOATE 1 PUFF: 50 POWDER, METERED ORAL; RESPIRATORY (INHALATION) at 21:06

## 2020-12-03 RX ADMIN — VENLAFAXINE HYDROCHLORIDE 75 MG: 75 CAPSULE, EXTENDED RELEASE ORAL at 08:06

## 2020-12-03 RX ADMIN — DOCUSATE SODIUM 100 MG: 100 CAPSULE, LIQUID FILLED ORAL at 21:05

## 2020-12-03 RX ADMIN — ASPIRIN 81 MG: 81 TABLET, COATED ORAL at 08:04

## 2020-12-03 RX ADMIN — Medication 0.25 MG: at 08:04

## 2020-12-03 RX ADMIN — HALOPERIDOL 7 MG: 5 TABLET ORAL at 21:06

## 2020-12-03 RX ADMIN — ATORVASTATIN CALCIUM 80 MG: 40 TABLET, FILM COATED ORAL at 21:05

## 2020-12-03 RX ADMIN — NICOTINE POLACRILEX 2 MG: 2 GUM, CHEWING ORAL at 21:10

## 2020-12-03 RX ADMIN — MEMANTINE 5 MG: 5 TABLET ORAL at 08:05

## 2020-12-03 RX ADMIN — DOCUSATE SODIUM 100 MG: 100 CAPSULE, LIQUID FILLED ORAL at 08:05

## 2020-12-03 ASSESSMENT — ACTIVITIES OF DAILY LIVING (ADL)
ADLS_ACUITY_SCORE: 13

## 2020-12-03 NOTE — PROGRESS NOTES
[facility]    Medicine Progress Note - Hospitalist Service       Date of Admission:  9/9/2020  Assessment & Plan       John Juárez is a 54 year old male admitted on 9/9/2020.  PMHx of schizophrenia, hx of TBI, alcohol use disorder, COPD, hyperlipidemia and hypothyroidism who was admitted on 9/9/2020 for evaluation of aggressive behavior at his group home. His medical condition remains stable, though his group home is now unwilling to take him back. Hospitalization has been prolonged while arranging for new placement.      Neurocognitive disorder dt hx of TBI and alcohol use disorder  Schizophrenia  Had been residing in group home prior to admission.  Brought to hospital for evaluation of aggressive behaviors. Group home now unwilling to take him back. Has had numerous CODE 21s called this stay. Seen by psych, meds adjusted. Is currently under civil commitment and court hold through [facility] until 7/31/21.  Patient has been undergoing paper work for funding and placement.  -- Patient has been last evaluated by psych on September 29th , no recent change in med's   --.Patient has been stable on current meds  -- Currently patient is on Cogentin 0.25 mg twice daily, Haldol 7 mg twice daily, Namenda 5 mg daily and Effexor 75 mg p.o. daily, continue current meds   -- PRN zyprexa and haldol available for agitation . Occasionally requires PRNS .  -- per social workers: Celia Layne, [phone], is the [facility] .She has requested [facility] to submit their funding request, still awaiting funding  Once MA and Wavier services are approved patient will be able to move the the [facility]     Hyperlipidemia  -- Chronic and stable on aspirin and statin     COPD; Not O2 dependent.  -- Stable on salmeterol     Hypothyroidism  --Chronic and stable on levothyroxine     Tobacco Use  --Using nicotine patch and prn gum     Resting tremors of upper  extremities  --Not clear if this is new.  Basic labs all checked on 12/3 and were all within normal limits.       Diet: Room Service  Combination Diet Regular Diet Adult; Safe Tray - NO utensils    DVT Prophylaxis: Pneumatic Compression Devices  Valentin Catheter: not present  Code Status: Full Code           Disposition Plan   Expected discharge: TBD, recommended to new group home once funding obtained.  Anticipated discharge date is December 7.  Entered: Ashtyn Barrientos MD 12/03/2020, 1:17 PM       The patient's care was discussed with the Bedside Nurse, Care Coordinator/ and Patient.    Ashtyn Barrientos MD  Hospitalist Service  [facility]  Contact information available via [facility] Paging/Directory    ______________________________________________________________________    Interval History   No acute events over night.  He was resting comfortably when seen.  No concerns from RN.    Data reviewed today: I reviewed all medications, new labs and imaging results over the last 24 hours. I personally reviewed no images or EKG's today.    Physical Exam   Vital Signs: Temp: 96.6  F (35.9  C) Temp src: Oral BP: 94/73 Pulse: 78   Resp: 16 SpO2: 97 % O2 Device: None (Room air)    Weight: 156 lbs 8 oz  General Appearance:  Resting comfortably  Respiratory: Did not auscultate but not in respiratory distress  Cardiovascular:   GI: Non-distended  Skin: No obvious rashes or cyanosis to exposed skin   Other: Calm    Data   Recent Labs   Lab 12/03/20  0833   WBC 5.4   HGB 16.4   MCV 92         POTASSIUM 4.0   CHLORIDE 109   CO2 24   BUN 18   CR 0.74   ANIONGAP 5   LESLEE 8.8   GLC 95   ALBUMIN 3.7   PROTTOTAL 8.3   BILITOTAL 0.6   ALKPHOS 92   *   *       No results found for this or any previous visit (from the past 24 hour(s)).

## 2020-12-03 NOTE — PLAN OF CARE
Shift note: Alert to self only, calm, door alarm in place, independent in room, slight tremors in upper ext, no IV access, discharge pending funding for group home, possible discharge Monday

## 2020-12-03 NOTE — PLAN OF CARE
Pt alert to self. VSS on RA. Up independently in room. Door alarm active. Regular diet, good appetite. No IV access. Denies pain, N/V. No agitation meds given this shift. Plan to discharge to [facility] funding pending; possibly Dec 7th.

## 2020-12-04 PROCEDURE — 120N000001 HC R&B MED SURG/OB

## 2020-12-04 PROCEDURE — 250N000013 HC RX MED GY IP 250 OP 250 PS 637: Performed by: NURSE PRACTITIONER

## 2020-12-04 PROCEDURE — 250N000013 HC RX MED GY IP 250 OP 250 PS 637: Performed by: INTERNAL MEDICINE

## 2020-12-04 PROCEDURE — 250N000013 HC RX MED GY IP 250 OP 250 PS 637: Performed by: PSYCHIATRY & NEUROLOGY

## 2020-12-04 PROCEDURE — 250N000013 HC RX MED GY IP 250 OP 250 PS 637: Performed by: HOSPITALIST

## 2020-12-04 PROCEDURE — 99231 SBSQ HOSP IP/OBS SF/LOW 25: CPT | Performed by: HOSPITALIST

## 2020-12-04 RX ADMIN — ATORVASTATIN CALCIUM 80 MG: 40 TABLET, FILM COATED ORAL at 20:04

## 2020-12-04 RX ADMIN — DOCUSATE SODIUM 100 MG: 100 CAPSULE, LIQUID FILLED ORAL at 09:02

## 2020-12-04 RX ADMIN — DOCUSATE SODIUM 100 MG: 100 CAPSULE, LIQUID FILLED ORAL at 20:04

## 2020-12-04 RX ADMIN — OLANZAPINE 10 MG: 5 TABLET, ORALLY DISINTEGRATING ORAL at 13:29

## 2020-12-04 RX ADMIN — NICOTINE POLACRILEX 2 MG: 2 GUM, CHEWING ORAL at 09:03

## 2020-12-04 RX ADMIN — LEVOTHYROXINE SODIUM 88 MCG: 88 TABLET ORAL at 09:02

## 2020-12-04 RX ADMIN — OLANZAPINE 10 MG: 5 TABLET, ORALLY DISINTEGRATING ORAL at 21:22

## 2020-12-04 RX ADMIN — MEMANTINE 5 MG: 5 TABLET ORAL at 09:02

## 2020-12-04 RX ADMIN — HALOPERIDOL 7 MG: 5 TABLET ORAL at 20:04

## 2020-12-04 RX ADMIN — SALMETEROL XINAFOATE 1 PUFF: 50 POWDER, METERED ORAL; RESPIRATORY (INHALATION) at 09:10

## 2020-12-04 RX ADMIN — NICOTINE POLACRILEX 2 MG: 2 GUM, CHEWING ORAL at 19:38

## 2020-12-04 RX ADMIN — Medication 0.25 MG: at 09:03

## 2020-12-04 RX ADMIN — ASPIRIN 81 MG: 81 TABLET, COATED ORAL at 09:02

## 2020-12-04 RX ADMIN — Medication 0.25 MG: at 20:04

## 2020-12-04 RX ADMIN — HALOPERIDOL 7 MG: 5 TABLET ORAL at 09:02

## 2020-12-04 RX ADMIN — HALOPERIDOL 5 MG: 5 TABLET ORAL at 12:17

## 2020-12-04 RX ADMIN — POLYETHYLENE GLYCOL 3350 17 G: 17 POWDER, FOR SOLUTION ORAL at 09:03

## 2020-12-04 RX ADMIN — VENLAFAXINE HYDROCHLORIDE 75 MG: 75 CAPSULE, EXTENDED RELEASE ORAL at 09:02

## 2020-12-04 ASSESSMENT — ACTIVITIES OF DAILY LIVING (ADL)
ADLS_ACUITY_SCORE: 13

## 2020-12-04 NOTE — PLAN OF CARE
VSS on RA. Denied pain. No concerns. Declining nicotine patch. Discharge pending funding for group home, possibly Monday.

## 2020-12-04 NOTE — PROGRESS NOTES
Care Management Follow Up    Length of Stay (days): 76    Expected Discharge Date: 12/07/20( pending funding)     Concerns to be Addressed: discharge planning     Patient plan of care discussed at interdisciplinary rounds: Yes    Anticipated Discharge Disposition: Group Home     Anticipated Discharge Services:    Anticipated Discharge DME: None    Patient/family educated on Medicare website which has current facility and service quality ratings:    Education Provided on the Discharge Plan:    Patient/Family in Agreement with the Plan:      Referrals Placed by CM/SW: Post Acute Facilities  Private pay costs discussed: Not applicable    Additional Information:  Patient notified he will be moving to [facility] on Monday.  Explained he will have his own room and will be able to smoke there.    Will follow up tomorrow to update his sister  finalize the arrangement.  His  is aware of the Monday discharge.  BETTYE Castillo

## 2020-12-04 NOTE — PROGRESS NOTES
[facility]    Medicine Progress Note - Hospitalist Service       Date of Admission:  9/9/2020  Assessment & Plan       John Juárez is a 54 year old male admitted on 9/9/2020.  PMHx of schizophrenia, hx of TBI, alcohol use disorder, COPD, hyperlipidemia and hypothyroidism who was admitted on 9/9/2020 for evaluation of aggressive behavior at his group home. His medical condition remains stable, though his group home is now unwilling to take him back. Hospitalization has been prolonged while arranging for new placement.      Neurocognitive disorder dt hx of TBI and alcohol use disorder  Schizophrenia  Had been residing in group home prior to admission.  Brought to hospital for evaluation of aggressive behaviors. Group home now unwilling to take him back. Has had numerous CODE 21s called this stay. Seen by psych, meds adjusted. Is currently under civil commitment and court hold through [facility] until 7/31/21.  Patient has been undergoing paper work for funding and placement.  -- Patient has been last evaluated by psych on September 29th , no recent change in med's   --.Patient has been stable on current meds  -- Currently patient is on Cogentin 0.25 mg twice daily, Haldol 7 mg twice daily, Namenda 5 mg daily and Effexor 75 mg p.o. daily, continue current meds   -- PRN zyprexa and haldol available for agitation . Occasionally requires PRNS .  -- per social workers: Celia Layne, [phone], is the [facility] at the [facility] .She has requested [facility] to submit their funding request, still awaiting funding  Once MA and [facility]avier services are approved patient will be able to move the the [facility]     Hyperlipidemia  -- Chronic and stable on aspirin and statin     COPD; Not O2 dependent.  -- Stable on salmeterol     Hypothyroidism  --Chronic and stable on levothyroxine     Tobacco Use  --Using nicotine patch and prn gum     Resting tremors of upper  extremities  --Not clear if this is new.  Basic labs all checked on 12/3 and were all within normal limits.       Diet: Room Service  Combination Diet Regular Diet Adult; Safe Tray - NO utensils    DVT Prophylaxis: Pneumatic Compression Devices  Valentin Catheter: not present  Code Status: Full Code           Disposition Plan   Expected discharge: TBD, recommended to new group home once funding obtained.  Anticipated discharge date is December 7.  Entered: Ashtyn Barrientos MD 12/04/2020, 12:29 PM       The patient's care was discussed with the Bedside Nurse, Care Coordinator/ and Patient.    Ashtyn Barrientos MD  Hospitalist Service  [facility]  Contact information available via [facility] Paging/Directory    ______________________________________________________________________    Interval History   No acute events over night.  He was seen ambulating the hallways with CNA.  No concerns from RN.    Data reviewed today: I reviewed all medications, new labs and imaging results over the last 24 hours. I personally reviewed no images or EKG's today.    Physical Exam   Vital Signs: Temp: 95.7  F (35.4  C) Temp src: Oral BP: 103/67 Pulse: 74   Resp: 16 SpO2: 98 % O2 Device: None (Room air)    Weight: 156 lbs 8 oz  General Appearance:   Seen ambulating the hallways  Respiratory: Nonlabored breathing  Cardiovascular:   GI: Non-distended  Skin: No obvious rashes or cyanosis to exposed skin   Other: Calm    Data   Recent Labs   Lab 12/03/20  0833   WBC 5.4   HGB 16.4   MCV 92         POTASSIUM 4.0   CHLORIDE 109   CO2 24   BUN 18   CR 0.74   ANIONGAP 5   LESLEE 8.8   GLC 95   ALBUMIN 3.7   PROTTOTAL 8.3   BILITOTAL 0.6   ALKPHOS 92   *   *       No results found for this or any previous visit (from the past 24 hour(s)).

## 2020-12-04 NOTE — PLAN OF CARE
"Pt disoriented to time/situation. Denied pain. Frequently asking to \"go smoke\". Declined Nicotine patch, nicotine gum given x1. Easily redirectable this shift. Regular diet. No IV. Up independently in room. Door alarm in place. Discharge to [facility], pending funding.  "

## 2020-12-05 PROCEDURE — 99231 SBSQ HOSP IP/OBS SF/LOW 25: CPT | Performed by: HOSPITALIST

## 2020-12-05 PROCEDURE — 250N000013 HC RX MED GY IP 250 OP 250 PS 637: Performed by: INTERNAL MEDICINE

## 2020-12-05 PROCEDURE — 250N000013 HC RX MED GY IP 250 OP 250 PS 637: Performed by: PSYCHIATRY & NEUROLOGY

## 2020-12-05 PROCEDURE — 250N000013 HC RX MED GY IP 250 OP 250 PS 637: Performed by: NURSE PRACTITIONER

## 2020-12-05 PROCEDURE — 250N000013 HC RX MED GY IP 250 OP 250 PS 637: Performed by: HOSPITALIST

## 2020-12-05 PROCEDURE — 120N000001 HC R&B MED SURG/OB

## 2020-12-05 RX ORDER — DOCUSATE SODIUM 100 MG/1
100 CAPSULE, LIQUID FILLED ORAL 2 TIMES DAILY
Qty: 60 CAPSULE | Refills: 0 | Status: ON HOLD | OUTPATIENT
Start: 2020-12-05 | End: 2022-01-13

## 2020-12-05 RX ORDER — HALOPERIDOL 2 MG/1
2 TABLET ORAL 2 TIMES DAILY
Qty: 60 TABLET | Refills: 0 | Status: SHIPPED | OUTPATIENT
Start: 2020-12-05 | End: 2021-06-30

## 2020-12-05 RX ORDER — LEVOTHYROXINE SODIUM 88 UG/1
88 TABLET ORAL DAILY
Qty: 30 TABLET | Refills: 0 | Status: ON HOLD | OUTPATIENT
Start: 2020-12-05 | End: 2022-09-20

## 2020-12-05 RX ORDER — HALOPERIDOL 5 MG/1
5 TABLET ORAL EVERY 6 HOURS PRN
Qty: 30 TABLET | Refills: 0 | Status: ON HOLD | OUTPATIENT
Start: 2020-12-05 | End: 2021-06-30

## 2020-12-05 RX ORDER — OLANZAPINE 10 MG/1
10 TABLET ORAL 2 TIMES DAILY PRN
Qty: 30 TABLET | Refills: 0 | Status: SHIPPED | OUTPATIENT
Start: 2020-12-05 | End: 2020-12-07

## 2020-12-05 RX ORDER — ALBUTEROL SULFATE 90 UG/1
1-2 AEROSOL, METERED RESPIRATORY (INHALATION) EVERY 4 HOURS PRN
Qty: 1 INHALER | Refills: 0 | Status: ON HOLD | OUTPATIENT
Start: 2020-12-05 | End: 2022-09-20

## 2020-12-05 RX ORDER — NICOTINE 21 MG/24HR
1 PATCH, TRANSDERMAL 24 HOURS TRANSDERMAL DAILY
Qty: 20 PATCH | Refills: 0 | Status: SHIPPED | OUTPATIENT
Start: 2020-12-06 | End: 2020-12-07

## 2020-12-05 RX ORDER — CHOLECALCIFEROL (VITAMIN D3) 50 MCG
1 TABLET ORAL DAILY
Qty: 30 TABLET | Refills: 0 | Status: ON HOLD | OUTPATIENT
Start: 2020-12-05 | End: 2022-01-13

## 2020-12-05 RX ORDER — MEMANTINE HYDROCHLORIDE 5 MG/1
5 TABLET ORAL DAILY
Qty: 30 TABLET | Refills: 0 | Status: ON HOLD | OUTPATIENT
Start: 2020-12-05 | End: 2022-01-13

## 2020-12-05 RX ORDER — ASPIRIN 81 MG/1
81 TABLET ORAL DAILY
Qty: 30 TABLET | Refills: 0 | Status: ON HOLD | OUTPATIENT
Start: 2020-12-05 | End: 2022-09-20

## 2020-12-05 RX ORDER — VENLAFAXINE HYDROCHLORIDE 37.5 MG/1
75 CAPSULE, EXTENDED RELEASE ORAL DAILY
Qty: 30 CAPSULE | Refills: 0 | Status: SHIPPED | OUTPATIENT
Start: 2020-12-05 | End: 2020-12-07

## 2020-12-05 RX ORDER — BENZTROPINE MESYLATE 0.5 MG/1
0.5 TABLET ORAL AT BEDTIME
Qty: 30 TABLET | Refills: 0 | Status: SHIPPED | OUTPATIENT
Start: 2020-12-05 | End: 2021-06-29

## 2020-12-05 RX ORDER — ACETAMINOPHEN 325 MG/1
650 TABLET ORAL EVERY 4 HOURS PRN
Qty: 1 BOTTLE | Refills: 0 | Status: ON HOLD | OUTPATIENT
Start: 2020-12-05 | End: 2022-01-26

## 2020-12-05 RX ORDER — ATORVASTATIN CALCIUM 80 MG/1
80 TABLET, FILM COATED ORAL AT BEDTIME
Qty: 30 TABLET | Refills: 0 | Status: ON HOLD | OUTPATIENT
Start: 2020-12-05 | End: 2022-09-20

## 2020-12-05 RX ORDER — HALOPERIDOL 5 MG/1
5 TABLET ORAL 2 TIMES DAILY
Qty: 60 TABLET | Refills: 0 | Status: ON HOLD | OUTPATIENT
Start: 2020-12-05 | End: 2021-06-30

## 2020-12-05 RX ORDER — POLYETHYLENE GLYCOL 3350 17 G/17G
1 POWDER, FOR SOLUTION ORAL DAILY
Qty: 30 PACKET | Refills: 0 | Status: ON HOLD | OUTPATIENT
Start: 2020-12-05 | End: 2022-01-18

## 2020-12-05 RX ORDER — MAGNESIUM HYDROXIDE/ALUMINUM HYDROXICE/SIMETHICONE 120; 1200; 1200 MG/30ML; MG/30ML; MG/30ML
30 SUSPENSION ORAL EVERY 4 HOURS PRN
Qty: 1 BOTTLE | Refills: 0 | Status: ON HOLD | OUTPATIENT
Start: 2020-12-05 | End: 2022-01-26

## 2020-12-05 RX ADMIN — HALOPERIDOL 7 MG: 5 TABLET ORAL at 08:42

## 2020-12-05 RX ADMIN — POLYETHYLENE GLYCOL 3350 17 G: 17 POWDER, FOR SOLUTION ORAL at 08:41

## 2020-12-05 RX ADMIN — Medication 0.25 MG: at 08:41

## 2020-12-05 RX ADMIN — OLANZAPINE 10 MG: 5 TABLET, ORALLY DISINTEGRATING ORAL at 23:43

## 2020-12-05 RX ADMIN — Medication 0.25 MG: at 20:17

## 2020-12-05 RX ADMIN — SALMETEROL XINAFOATE 1 PUFF: 50 POWDER, METERED ORAL; RESPIRATORY (INHALATION) at 20:17

## 2020-12-05 RX ADMIN — ASPIRIN 81 MG: 81 TABLET, COATED ORAL at 08:41

## 2020-12-05 RX ADMIN — SALMETEROL XINAFOATE 1 PUFF: 50 POWDER, METERED ORAL; RESPIRATORY (INHALATION) at 08:43

## 2020-12-05 RX ADMIN — NICOTINE POLACRILEX 2 MG: 2 GUM, CHEWING ORAL at 16:11

## 2020-12-05 RX ADMIN — DOCUSATE SODIUM 100 MG: 100 CAPSULE, LIQUID FILLED ORAL at 08:41

## 2020-12-05 RX ADMIN — MEMANTINE 5 MG: 5 TABLET ORAL at 08:42

## 2020-12-05 RX ADMIN — HALOPERIDOL 7 MG: 5 TABLET ORAL at 20:17

## 2020-12-05 RX ADMIN — LEVOTHYROXINE SODIUM 88 MCG: 88 TABLET ORAL at 08:41

## 2020-12-05 RX ADMIN — NICOTINE POLACRILEX 2 MG: 2 GUM, CHEWING ORAL at 08:45

## 2020-12-05 RX ADMIN — VENLAFAXINE HYDROCHLORIDE 75 MG: 75 CAPSULE, EXTENDED RELEASE ORAL at 08:42

## 2020-12-05 RX ADMIN — ATORVASTATIN CALCIUM 80 MG: 40 TABLET, FILM COATED ORAL at 20:17

## 2020-12-05 RX ADMIN — DOCUSATE SODIUM 100 MG: 100 CAPSULE, LIQUID FILLED ORAL at 20:17

## 2020-12-05 ASSESSMENT — ACTIVITIES OF DAILY LIVING (ADL)
ADLS_ACUITY_SCORE: 13

## 2020-12-05 NOTE — PROGRESS NOTES
Care Management Follow Up    Length of Stay (days): 77    Expected Discharge Date: 12/07/20( pending funding)     Concerns to be Addressed: discharge planning     Patient plan of care discussed at interdisciplinary rounds: Yes    Anticipated Discharge Disposition: Group Home     Anticipated Discharge Services:    Anticipated Discharge DME: None    Patient/family educated on Medicare website which has current facility and service quality ratings:    Education Provided on the Discharge Plan:    Patient/Family in Agreement with the Plan:      Referrals Placed by CM/SW: Post Acute Facilities  Private pay costs discussed: Not applicable    Additional Information:  Orders were faxed to Emily, Clinical Director of [facility] at [phone] and spoke with Emily at [phone].  Emily reports she has not received the formal notice indicating that funding will begin on Monday Dec. 7th.  She has calls out to the Commitment CM Kurtis Chapin regarding obtaining the notice.  Based on this she would like to time to clear this up on Monday morning so she is requesting John not leave the hospital until  1300.  [facility] as he has not been tested since 9/9/20. Paged Dr Barrientos with request  Discharge transport arranged with [facility],- stretcher, due to commitment status and intermittent elopement risk.    Transport scheduled for Monday December 7th at 1300.      BETTYE Castillo

## 2020-12-05 NOTE — PLAN OF CARE
"Pt disoriented to time/situation. Denied pain. Soft BP, other VSS. Agitated/restless this shift, stating that he wants to go home and that his cab is waiting for him downstairs. PRN haldol (po) and zyprexa (po) given with releif. Frequently asking to \"go smoke\". Declined Nicotine patch, nicotine gum given x1. Regular diet. No IV. Up independently in room. Door alarm in place. Plan to discharge to [facility] on Monday.     "

## 2020-12-05 NOTE — PLAN OF CARE
"Disoriented to time & situation - agitated/restless & wanting to leave, stating \"I need my clothes, my cab is here\" - PRN Zyprexa given, as well as scheduled haldol: effective. Vital signs performed once a day. Denies pain. Asks to \"go smoke\" - declines nicotine patch, nicotine gum given x1. Regular diet. No IV. IND in room. Door alarm in place. Plan to discharge to [facility] on Monday.  "

## 2020-12-05 NOTE — PROGRESS NOTES
[facility]    Medicine Progress Note - Hospitalist Service       Date of Admission:  9/9/2020  Assessment & Plan       John Juárez is a 54 year old male admitted on 9/9/2020.  PMHx of schizophrenia, hx of TBI, alcohol use disorder, COPD, hyperlipidemia and hypothyroidism who was admitted on 9/9/2020 for evaluation of aggressive behavior at his group home. His medical condition remains stable, though his group home is now unwilling to take him back. Hospitalization has been prolonged while arranging for new placement.      Neurocognitive disorder dt hx of TBI and alcohol use disorder  Schizophrenia  Had been residing in group home prior to admission.  Brought to hospital for evaluation of aggressive behaviors. Group home now unwilling to take him back. Has had numerous CODE 21s called this stay. Seen by psych, meds adjusted. Is currently under civil commitment and court hold through [facility] until 7/31/21.  Patient has been undergoing paper work for funding and placement.  -- Patient has been last evaluated by psych on September 29th , no recent change in med's   --.Patient has been stable on current meds  -- Currently patient is on Cogentin 0.25 mg twice daily, Haldol 7 mg twice daily, Namenda 5 mg daily and Effexor 75 mg p.o. daily, continue current meds   -- PRN zyprexa and haldol available for agitation . Occasionally requires PRNs .     Hyperlipidemia  -- Chronic and stable on aspirin and statin     COPD; Not O2 dependent.  -- Stable on salmeterol     Hypothyroidism  --Chronic and stable on levothyroxine     Tobacco Use  --Using nicotine patch and prn gum     Resting tremors of upper extremities  --Not clear if this is new.  Basic labs all checked on 12/3 and were all within normal limits.       Diet: Room Service  Combination Diet Regular Diet Adult; Safe Tray - NO utensils  Diet    DVT Prophylaxis: Pneumatic Compression Devices  Valentin Catheter: not present  Code Status:  Full Code           Disposition Plan   Expected discharge: TBD, recommended to new group home once funding obtained.  Anticipated discharge date is December 7.  Discharge orders and medication reconciliation has been completed per  request.  She will fax to the group home to plan for discharge on December 7.  Entered: Ashtyn Barrientos MD 12/05/2020, 3:11 PM       The patient's care was discussed with the Bedside Nurse, Care Coordinator/ and Patient.    Ashtyn Barrientos MD  Hospitalist Service  [facility]  Contact information available via [facility] Paging/Directory    ______________________________________________________________________    Interval History   No acute events over night.  He was seen resting in his room.  No concerns from RN.    Data reviewed today: I reviewed all medications, new labs and imaging results over the last 24 hours. I personally reviewed no images or EKG's today.    Physical Exam   Vital Signs: Temp: 96.8  F (36  C) Temp src: Oral BP: 101/70 Pulse: 68   Resp: 16 SpO2: 96 % O2 Device: None (Room air)    Weight: 156 lbs 8 oz  General Appearance:   Laying in bed, resting  Respiratory: Nonlabored breathing  Cardiovascular:   GI: Non-distended  Skin: No obvious rashes or cyanosis to exposed skin   Other: Calm    Data   Recent Labs   Lab 12/03/20  0833   WBC 5.4   HGB 16.4   MCV 92         POTASSIUM 4.0   CHLORIDE 109   CO2 24   BUN 18   CR 0.74   ANIONGAP 5   LESLEE 8.8   GLC 95   ALBUMIN 3.7   PROTTOTAL 8.3   BILITOTAL 0.6   ALKPHOS 92   *   *       No results found for this or any previous visit (from the past 24 hour(s)).

## 2020-12-06 PROCEDURE — U0003 INFECTIOUS AGENT DETECTION BY NUCLEIC ACID (DNA OR RNA); SEVERE ACUTE RESPIRATORY SYNDROME CORONAVIRUS 2 (SARS-COV-2) (CORONAVIRUS DISEASE [COVID-19]), AMPLIFIED PROBE TECHNIQUE, MAKING USE OF HIGH THROUGHPUT TECHNOLOGIES AS DESCRIBED BY CMS-2020-01-R: HCPCS | Performed by: HOSPITALIST

## 2020-12-06 PROCEDURE — 99231 SBSQ HOSP IP/OBS SF/LOW 25: CPT | Performed by: HOSPITALIST

## 2020-12-06 PROCEDURE — 250N000013 HC RX MED GY IP 250 OP 250 PS 637: Performed by: HOSPITALIST

## 2020-12-06 PROCEDURE — 250N000013 HC RX MED GY IP 250 OP 250 PS 637: Performed by: PSYCHIATRY & NEUROLOGY

## 2020-12-06 PROCEDURE — 120N000001 HC R&B MED SURG/OB

## 2020-12-06 PROCEDURE — 250N000013 HC RX MED GY IP 250 OP 250 PS 637: Performed by: INTERNAL MEDICINE

## 2020-12-06 PROCEDURE — 250N000013 HC RX MED GY IP 250 OP 250 PS 637: Performed by: NURSE PRACTITIONER

## 2020-12-06 RX ORDER — OLANZAPINE 5 MG/1
5 TABLET, ORALLY DISINTEGRATING ORAL
Status: COMPLETED | OUTPATIENT
Start: 2020-12-06 | End: 2020-12-14

## 2020-12-06 RX ADMIN — NICOTINE POLACRILEX 2 MG: 2 GUM, CHEWING ORAL at 09:10

## 2020-12-06 RX ADMIN — HALOPERIDOL 7 MG: 5 TABLET ORAL at 09:05

## 2020-12-06 RX ADMIN — SALMETEROL XINAFOATE 1 PUFF: 50 POWDER, METERED ORAL; RESPIRATORY (INHALATION) at 09:06

## 2020-12-06 RX ADMIN — POLYETHYLENE GLYCOL 3350 17 G: 17 POWDER, FOR SOLUTION ORAL at 09:04

## 2020-12-06 RX ADMIN — SALMETEROL XINAFOATE 1 PUFF: 50 POWDER, METERED ORAL; RESPIRATORY (INHALATION) at 20:45

## 2020-12-06 RX ADMIN — OLANZAPINE 10 MG: 5 TABLET, ORALLY DISINTEGRATING ORAL at 12:40

## 2020-12-06 RX ADMIN — MELATONIN TAB 3 MG 3 MG: 3 TAB at 20:45

## 2020-12-06 RX ADMIN — HALOPERIDOL 5 MG: 5 TABLET ORAL at 22:43

## 2020-12-06 RX ADMIN — LEVOTHYROXINE SODIUM 88 MCG: 88 TABLET ORAL at 09:04

## 2020-12-06 RX ADMIN — ASPIRIN 81 MG: 81 TABLET, COATED ORAL at 09:04

## 2020-12-06 RX ADMIN — NICOTINE POLACRILEX 2 MG: 2 GUM, CHEWING ORAL at 18:44

## 2020-12-06 RX ADMIN — VENLAFAXINE HYDROCHLORIDE 75 MG: 75 CAPSULE, EXTENDED RELEASE ORAL at 09:04

## 2020-12-06 RX ADMIN — HALOPERIDOL 5 MG: 5 TABLET ORAL at 14:58

## 2020-12-06 RX ADMIN — DOCUSATE SODIUM 100 MG: 100 CAPSULE, LIQUID FILLED ORAL at 20:45

## 2020-12-06 RX ADMIN — ATORVASTATIN CALCIUM 80 MG: 40 TABLET, FILM COATED ORAL at 20:46

## 2020-12-06 RX ADMIN — MEMANTINE 5 MG: 5 TABLET ORAL at 09:05

## 2020-12-06 RX ADMIN — Medication 0.25 MG: at 20:46

## 2020-12-06 RX ADMIN — OLANZAPINE 10 MG: 5 TABLET, ORALLY DISINTEGRATING ORAL at 18:44

## 2020-12-06 RX ADMIN — HALOPERIDOL 7 MG: 5 TABLET ORAL at 20:45

## 2020-12-06 RX ADMIN — Medication 0.25 MG: at 09:05

## 2020-12-06 RX ADMIN — NICOTINE POLACRILEX 2 MG: 2 GUM, CHEWING ORAL at 22:43

## 2020-12-06 RX ADMIN — DOCUSATE SODIUM 100 MG: 100 CAPSULE, LIQUID FILLED ORAL at 09:05

## 2020-12-06 RX ADMIN — NICOTINE POLACRILEX 2 MG: 2 GUM, CHEWING ORAL at 12:41

## 2020-12-06 ASSESSMENT — ACTIVITIES OF DAILY LIVING (ADL)
ADLS_ACUITY_SCORE: 13

## 2020-12-06 NOTE — PLAN OF CARE
Oriented to self only - pt agitated and restless this shift, calling frequently asking for his clothe and stating he wants to go outside to smoke, PRN PO zypreza & haldol given. Denies pain. Declines nicotine patch, gum given x2 with little relief. Regular diet. No IV. IND in room. Door alarm in place. Asymptomatic COVID rule out swab test completed--results pending. Plan to discharge to [facility] on Monday at 1pm.

## 2020-12-06 NOTE — PLAN OF CARE
"Pt disoriented to time/situation. Denied pain. Occassionally asking to \"go smoke\". Declined Nicotine patch, nicotine gum given x1. Easily redirectable this shift. Regular diet. No IV. Up independently in room. Door alarm in place. Discharge to [facility] on Monday. Attempted to do asymptomatic COVID rule out swab per discharge plan, pt refused, will attempt again tonight or tomorrow when pt is in better mood.   "

## 2020-12-06 NOTE — PLAN OF CARE
"Oriented to self only - calling police saying \"I am being held against my will\", PRN PO zypreza given & phone taken away. Denies pain. Declines nicotine patch & gum. Regular diet. No IV. IND in room. Door alarm in place. Discharge to [facility] on Monday. Declines asymptomatic COVID rule out swab test for discharge, refusing - Must be re-approached when in a better mood.  "

## 2020-12-06 NOTE — PROGRESS NOTES
Care Management Follow Up    Length of Stay (days): 78    Expected Discharge Date: 12/07/20(at 1300 to [facility]. )     Concerns to be Addressed: discharge planning     Patient plan of care discussed at interdisciplinary rounds: Yes    Anticipated Discharge Disposition: Group Home     Anticipated Discharge Services:    Anticipated Discharge DME: None    Patient/family educated on Medicare website which has current facility and service quality ratings:    Education Provided on the Discharge Plan:    Patient/Family in Agreement with the Plan:      Referrals Placed by CM/SW: Post Acute Facilities  Private pay costs discussed: Not applicable    Additional Information:  Met with patient to review discharge plan for tomorrow.  When writer entered the room patient was resting comfortably in bed watching the [place] game.  Writer re-introduced the plan for patient to discharge tomorrow to the group [facility] and he immediately jumped up and said he was going home today.  He became verbally agitated and jumped up and went into the bathroom.  Writer returned a few minutes later and he said he was leaving today, he didn't want to keep racking up his bill and that he was going home.     Writer left patient as my presence was only agitating him.  Charge nurse notified.      PLAN:    Writer will see patient in the morning.    Writer will update the clinical manager that patient would not allow another COVID test to be given.  Need to finalize with [facility] that his TBI funding will begin on 12/7.    Have asked his Commitment CM,Leonid Chapin, to be at the group [facility] when patient arrives. Will find out tomorrow morning if he can be there.   Orders were completed on Saturday and faxed to the [facility] clinical coordinator.  The medications were e-scribed to the pharmacy the [facility] uses.     Current plan is discharge on Monday at 1300.      BETTYE Castillo

## 2020-12-06 NOTE — PROGRESS NOTES
[facility]    Medicine Progress Note - Hospitalist Service       Date of Admission:  9/9/2020  Assessment & Plan       John Juárez is a 54 year old male admitted on 9/9/2020.  PMHx of schizophrenia, hx of TBI, alcohol use disorder, COPD, hyperlipidemia and hypothyroidism who was admitted on 9/9/2020 for evaluation of aggressive behavior at his group home. His medical condition remains stable, though his group home is now unwilling to take him back. Hospitalization has been prolonged while arranging for new placement.      Neurocognitive disorder dt hx of TBI and alcohol use disorder  Schizophrenia  Had been residing in group home prior to admission.  Brought to hospital for evaluation of aggressive behaviors. Group home now unwilling to take him back. Has had numerous CODE 21s called this stay. Seen by psych, meds adjusted. Is currently under civil commitment and court hold through [facility] until 7/31/21.  Patient has been undergoing paper work for funding and placement.  -- Patient has been last evaluated by psych on September 29th , no recent change in med's   --.Patient has been stable on current meds  -- Currently patient is on Cogentin 0.25 mg twice daily, Haldol 7 mg twice daily, Namenda 5 mg daily and Effexor 75 mg p.o. daily, continue current meds   -- PRN zyprexa and haldol available for agitation . Occasionally requires PRNs .     Hyperlipidemia  -- Chronic and stable on aspirin and statin     COPD; Not O2 dependent.  -- Stable on salmeterol     Hypothyroidism  --Chronic and stable on levothyroxine     Tobacco Use  --Using nicotine patch and prn gum     Resting tremors of upper extremities  --Not clear if this is new.  Basic labs all checked on 12/3 and were all within normal limits.       Diet: Room Service  Combination Diet Regular Diet Adult; Safe Tray - NO utensils  Diet    DVT Prophylaxis: Pneumatic Compression Devices  Valentin Catheter: not present  Code Status:  Full Code           Disposition Plan   Expected discharge: TBD, recommended to new group home once funding obtained.  Anticipated discharge date is December 7.  Discharge orders and medication reconciliation has been completed per  request.  She will fax to the group home to plan for discharge on December 7.  Ride has been set up for 1 PM on December 7.  Entered: Ashtyn Barrientos MD 12/06/2020, 2:07 PM       The patient's care was discussed with the Bedside Nurse, Care Coordinator/ and Patient.    Ashtyn Barrientos MD  Hospitalist Service  [facility]  Contact information available via [facility] Paging/Directory    ______________________________________________________________________    Interval History   No acute events over night.  He was walking around in his room.  No concerns from RN.    Data reviewed today: I reviewed all medications, new labs and imaging results over the last 24 hours. I personally reviewed no images or EKG's today.    Physical Exam   Vital Signs: Temp: 96.4  F (35.8  C) Temp src: Axillary BP: (!) 86/58 Pulse: 68   Resp: 16 SpO2: 95 % O2 Device: None (Room air)    Weight: 156 lbs 8 oz  General Appearance:   Ambulating in room, no acute distress, no concerns  Respiratory: Nonlabored breathing  Cardiovascular:   GI: Non-distended  Skin: No obvious rashes or cyanosis to exposed skin   Other: Calm    Data   Recent Labs   Lab 12/03/20  0833   WBC 5.4   HGB 16.4   MCV 92         POTASSIUM 4.0   CHLORIDE 109   CO2 24   BUN 18   CR 0.74   ANIONGAP 5   LESLEE 8.8   GLC 95   ALBUMIN 3.7   PROTTOTAL 8.3   BILITOTAL 0.6   ALKPHOS 92   *   *       No results found for this or any previous visit (from the past 24 hour(s)).

## 2020-12-07 LAB
LABORATORY COMMENT REPORT: NORMAL
SARS-COV-2 RNA SPEC QL NAA+PROBE: NEGATIVE
SARS-COV-2 RNA SPEC QL NAA+PROBE: NORMAL
SPECIMEN SOURCE: NORMAL
SPECIMEN SOURCE: NORMAL

## 2020-12-07 PROCEDURE — 99233 SBSQ HOSP IP/OBS HIGH 50: CPT | Performed by: HOSPITALIST

## 2020-12-07 PROCEDURE — 99207 PR NO CHARGE LOS: CPT | Performed by: NURSE PRACTITIONER

## 2020-12-07 PROCEDURE — 250N000013 HC RX MED GY IP 250 OP 250 PS 637: Performed by: INTERNAL MEDICINE

## 2020-12-07 PROCEDURE — 250N000013 HC RX MED GY IP 250 OP 250 PS 637: Performed by: NURSE PRACTITIONER

## 2020-12-07 PROCEDURE — 250N000013 HC RX MED GY IP 250 OP 250 PS 637: Performed by: HOSPITALIST

## 2020-12-07 PROCEDURE — 120N000001 HC R&B MED SURG/OB

## 2020-12-07 PROCEDURE — 250N000013 HC RX MED GY IP 250 OP 250 PS 637: Performed by: PSYCHIATRY & NEUROLOGY

## 2020-12-07 RX ORDER — NICOTINE 21 MG/24HR
1 PATCH, TRANSDERMAL 24 HOURS TRANSDERMAL DAILY
Qty: 30 PATCH | Refills: 0 | Status: ON HOLD | OUTPATIENT
Start: 2020-12-07 | End: 2022-01-13

## 2020-12-07 RX ORDER — VENLAFAXINE HYDROCHLORIDE 75 MG/1
75 CAPSULE, EXTENDED RELEASE ORAL DAILY
Qty: 30 CAPSULE | Refills: 0 | Status: SHIPPED | OUTPATIENT
Start: 2020-12-07 | End: 2021-06-29

## 2020-12-07 RX ORDER — OLANZAPINE 10 MG/1
10 TABLET, ORALLY DISINTEGRATING ORAL 2 TIMES DAILY PRN
Qty: 60 TABLET | Refills: 0 | Status: ON HOLD | OUTPATIENT
Start: 2020-12-07 | End: 2021-06-30

## 2020-12-07 RX ADMIN — ASPIRIN 81 MG: 81 TABLET, COATED ORAL at 08:32

## 2020-12-07 RX ADMIN — VENLAFAXINE HYDROCHLORIDE 75 MG: 75 CAPSULE, EXTENDED RELEASE ORAL at 08:31

## 2020-12-07 RX ADMIN — ATORVASTATIN CALCIUM 80 MG: 40 TABLET, FILM COATED ORAL at 22:08

## 2020-12-07 RX ADMIN — Medication 0.25 MG: at 22:08

## 2020-12-07 RX ADMIN — LEVOTHYROXINE SODIUM 88 MCG: 88 TABLET ORAL at 08:32

## 2020-12-07 RX ADMIN — Medication 0.25 MG: at 08:32

## 2020-12-07 RX ADMIN — DOCUSATE SODIUM 100 MG: 100 CAPSULE, LIQUID FILLED ORAL at 22:08

## 2020-12-07 RX ADMIN — POLYETHYLENE GLYCOL 3350 17 G: 17 POWDER, FOR SOLUTION ORAL at 08:31

## 2020-12-07 RX ADMIN — HALOPERIDOL 7 MG: 5 TABLET ORAL at 20:22

## 2020-12-07 RX ADMIN — SALMETEROL XINAFOATE 1 PUFF: 50 POWDER, METERED ORAL; RESPIRATORY (INHALATION) at 11:51

## 2020-12-07 RX ADMIN — SALMETEROL XINAFOATE 1 PUFF: 50 POWDER, METERED ORAL; RESPIRATORY (INHALATION) at 22:11

## 2020-12-07 RX ADMIN — OLANZAPINE 10 MG: 5 TABLET, ORALLY DISINTEGRATING ORAL at 20:22

## 2020-12-07 RX ADMIN — MEMANTINE 5 MG: 5 TABLET ORAL at 08:32

## 2020-12-07 RX ADMIN — HALOPERIDOL 7 MG: 5 TABLET ORAL at 08:31

## 2020-12-07 ASSESSMENT — ACTIVITIES OF DAILY LIVING (ADL)
ADLS_ACUITY_SCORE: 13

## 2020-12-07 NOTE — PROGRESS NOTES
Care Management Follow Up    Length of Stay (days): 79    Expected Discharge Date: 12/07/20(at 1300 to shelter. )     Concerns to be Addressed: discharge planning     Patient plan of care discussed at interdisciplinary rounds: Yes    Anticipated Discharge Disposition: Group Home     Anticipated Discharge Services:    Anticipated Discharge DME: None    Patient/family educated on Medicare website which has current facility and service quality ratings:    Education Provided on the Discharge Plan:    Patient/Family in Agreement with the Plan:      Referrals Placed by CM/SW: Post Acute Facilities  Private pay costs discussed: Need to talk with John about paying his monthly rent at [facility].      Additional Information:  Patient will not discharge today at 1300 as planned. Two items need to be resolved before discharge.  First his COVID test results need to be known. The test was not ordered under priority status per lab. It may take up to 72 hours from when it was received.  [facility] staff are trying to expedite the test.   Second, In addition to the [employer] funding which is given to the group home,  patient needs to be willing to pay the customary rent for the group home.   did speak with a supervisor, Gabriel  where John lived before ([facility] at [phone])  He reports John would pay $350.00 monthly rent with a money order.  The home still has patient's Debit card, EBT card and his personal belongings. Writer will also need to have patient's belongings brought here or delivered to the [facility].   Writechristie is coordinating the financial issue with his Commitment SALVATORE Chapin.    1135  Update:  Kurtis Chapin reports he spoke with patient on December 2, via phone, regarding the move to a new facility.  Patient said he would not go and was going to his sister's.  Today writer met with patient to talk again about the new facility we have located for patient.  Patient replied 'I'm not  "going to a facility, I'm going home\" When writer questioned that he has a home, he replied \" I have 2 mansions in [place], one in [place] and one in [facility]\".  Patient was not joking.      There is not a facility which will be willing to accept patient if patient is not willing to pay his required contribution from his monthly social security check to the facility. At this time, patient isn't willing to enter a facility or pay for the care.  In his mind he feels he can move into one of his mansions.    Patient's commitment  is of the opinion patient is not able to make informed decisions and plans to talk with the [facility] Attorney regarding the existing commitment and whether a guardianship/conservatorship should be pursued.    Psychiatry consults during this hospitalization give patient the diagnosis of neurocognitive disorder secondary to TBI and chronic alcohol use and Other SDchizophrenia Spectrum Disorder.  There does not appear to be a comment on patient's decision making capacity.  PLAN: :  Will discuss with  Dr Barrientos to consider  psychiatry consult relating to decision making capacity.        Lulu Contreras, [facility] Center[facility]      "

## 2020-12-07 NOTE — PROGRESS NOTES
Scheduled patient with an after hospital stay follow-up with his primary PCP.  Scheduled patient to see Dr. Sushil Molina on December 15th at 10:30 AM for an after hospital stay follow-up at the [facility].  [address] [place] [place] [address]    [phone]  Appointment placed on AVS.    Addendum:  Per DAVINA Cardona, patient has his PCP and Psychiatry at [facility].  Called Clinic and scheduled patient to see Dr. Cruz on December 14th at 1:45 PM for an after hospital stay follow-up at the the [facility].  [address]   [phone], and scheduled with a Tele-Health Visit with your Psychiatrist Denita Awan on Tuesday, December 15th at 2:15 PM.  You will receive a call from the Clinic for this appointment.  Awaiting call from DAVINA for number the clinic will need to call patient for this appointment.     Beatriz Perez RN, BSN, OCN   Inpatient Care Coordination [address]  Office: [phone]

## 2020-12-07 NOTE — PROGRESS NOTES
[facility]    Medicine Progress Note - Hospitalist Service       Date of Admission:  9/9/2020  Assessment & Plan       John Juárez is a 54 year old male admitted on 9/9/2020.  PMHx of schizophrenia, hx of TBI, alcohol use disorder, COPD, hyperlipidemia and hypothyroidism who was admitted on 9/9/2020 for evaluation of aggressive behavior at his group home. His medical condition remains stable, though his group home is now unwilling to take him back. Hospitalization has been prolonged while arranging for new placement.      Neurocognitive disorder dt hx of TBI and alcohol use disorder  Schizophrenia  Had been residing in group home prior to admission.  Brought to hospital for evaluation of aggressive behaviors. Group home now unwilling to take him back. Has had numerous CODE 21s called this stay. Seen by psych, meds adjusted. Is currently under civil commitment and court hold through [facility] until 7/31/21.  Patient has been undergoing paper work for funding and placement.  -- Patient has been last evaluated by psych on September 29th , no recent change in med's   --.Patient has been stable on current meds  -- Currently patient is on Cogentin 0.25 mg twice daily, Haldol 7 mg twice daily, Namenda 5 mg daily and Effexor 75 mg p.o. daily, continue current meds   -- PRN zyprexa Zydis and haldol available for agitation . Occasionally requires PRNs .     Hyperlipidemia  -- Chronic and stable on aspirin and statin     COPD; Not O2 dependent.  -- Stable on salmeterol     Hypothyroidism  --Chronic and stable on levothyroxine     Tobacco Use  --Using nicotine patch and prn gum     Resting tremors of upper extremities  --Not clear if this is new.  Basic labs all checked on 12/3 and were all within normal limits.    Asymptomatic COVID-19 testing negative on 12/6/2020 [done for discharge planning]     Diet: Room Service  Combination Diet Regular Diet Adult; Safe Tray - NO utensils  Diet    DVT  Prophylaxis: Pneumatic Compression Devices  Valentin Catheter: not present  Code Status: Full Code           Disposition Plan   Expected discharge: TBD, recommended to new group home once funding obtained.    Patient was planned to discharge on December 7.  However discharge had to be canceled as there is yet some unresolved financial issues that need to be sorted out prior to discharge.  Discharge medication reconciliation has been completed and medications have already been sent to the pharmacy of choice for the new group home.  Prior authorization was obtained for Zyprexa Zydis already.  Hopefully patient will be able to discharge to new group [facility] in the next day or 2 once remaining financial issues are resolved.    Entered: Ashtyn Barrientos MD 12/07/2020, 12:56 PM       Total time spent in patient encounter: 25 minutes  Greater than 50% time was spent in setting up discharge orders and medications, communicating with outside pharmacy regarding prior authorization, discussing case with /care coordination.    The patient's care was discussed with the Bedside Nurse, Care Coordinator/ and Patient.    Ashtyn Barrientos MD  Hospitalist Service  [facility]  Contact information available via [facility] Paging/Directory    ______________________________________________________________________    Interval History   No acute events over night.  He was laying in bed, resting.  No concerns from RN.    Data reviewed today: I reviewed all medications, new labs and imaging results over the last 24 hours. I personally reviewed no images or EKG's today.    Physical Exam   Vital Signs: Temp: 96.8  F (36  C) Temp src: Axillary BP: 99/63 Pulse: 72   Resp: 17 SpO2: 96 % O2 Device: None (Room air)    Weight: 156 lbs 8 oz  General Appearance:   Laying in bed, resting, no acute distress, no concerns  Respiratory: Nonlabored breathing  Cardiovascular:   GI: Non-distended  Skin: No obvious rashes  or cyanosis to exposed skin   Other: Calm    Data   Recent Labs   Lab 12/03/20  0833   WBC 5.4   HGB 16.4   MCV 92         POTASSIUM 4.0   CHLORIDE 109   CO2 24   BUN 18   CR 0.74   ANIONGAP 5   LESLEE 8.8   GLC 95   ALBUMIN 3.7   PROTTOTAL 8.3   BILITOTAL 0.6   ALKPHOS 92   *   *       No results found for this or any previous visit (from the past 24 hour(s)).

## 2020-12-07 NOTE — PROGRESS NOTES
Patient apparently refusing to go to group home now and stating that he wants to go to his [facility] and [facility].  Have placed consult to psychiatry to assess decision-making capacity.

## 2020-12-07 NOTE — PLAN OF CARE
[id number]: Door alarm on. Pt Aox self, thought is was July 16th, believed he was in [facility] and could not state why he was here/ what brought him in. VSS on RA, IND in room, CMS intact. Pt denied pain. Tolerating regular diet- adequate fluid intake. No IV access. Plan was for pt to discharge to group home this afternoon however they requested a covid r/o and there are some insurance issues. Plan to discharge pending covid result and insurance.

## 2020-12-07 NOTE — PLAN OF CARE
"Oriented to self only. Pt agitated & restless this shift - states \"I need my clothes and need to go to the movies downstairs\", PRN haldol given. Denies pain. Asking to smoke, PRN gum given x1. Regular diet - big appetite. No PIV. IND. Door alarm in place. Discharge to [facility] 12/7 @ 1pm.   "

## 2020-12-08 PROCEDURE — 99207 PR CDG-CODE CATEGORY CHANGED: CPT | Performed by: PSYCHIATRY & NEUROLOGY

## 2020-12-08 PROCEDURE — 99232 SBSQ HOSP IP/OBS MODERATE 35: CPT | Performed by: HOSPITALIST

## 2020-12-08 PROCEDURE — 99233 SBSQ HOSP IP/OBS HIGH 50: CPT | Performed by: PSYCHIATRY & NEUROLOGY

## 2020-12-08 PROCEDURE — 120N000001 HC R&B MED SURG/OB

## 2020-12-08 PROCEDURE — 250N000013 HC RX MED GY IP 250 OP 250 PS 637: Performed by: PSYCHIATRY & NEUROLOGY

## 2020-12-08 PROCEDURE — 250N000013 HC RX MED GY IP 250 OP 250 PS 637: Performed by: INTERNAL MEDICINE

## 2020-12-08 PROCEDURE — 250N000013 HC RX MED GY IP 250 OP 250 PS 637: Performed by: HOSPITALIST

## 2020-12-08 RX ADMIN — Medication 0.25 MG: at 20:43

## 2020-12-08 RX ADMIN — MEMANTINE 5 MG: 5 TABLET ORAL at 08:43

## 2020-12-08 RX ADMIN — LEVOTHYROXINE SODIUM 88 MCG: 88 TABLET ORAL at 08:43

## 2020-12-08 RX ADMIN — Medication 0.25 MG: at 08:43

## 2020-12-08 RX ADMIN — HALOPERIDOL 5 MG: 5 TABLET ORAL at 12:13

## 2020-12-08 RX ADMIN — HALOPERIDOL 7 MG: 5 TABLET ORAL at 08:43

## 2020-12-08 RX ADMIN — SALMETEROL XINAFOATE 1 PUFF: 50 POWDER, METERED ORAL; RESPIRATORY (INHALATION) at 20:44

## 2020-12-08 RX ADMIN — ATORVASTATIN CALCIUM 80 MG: 40 TABLET, FILM COATED ORAL at 20:43

## 2020-12-08 RX ADMIN — MELATONIN TAB 3 MG 3 MG: 3 TAB at 20:43

## 2020-12-08 RX ADMIN — ASPIRIN 81 MG: 81 TABLET, COATED ORAL at 08:43

## 2020-12-08 RX ADMIN — HALOPERIDOL 5 MG: 5 TABLET ORAL at 18:13

## 2020-12-08 RX ADMIN — HALOPERIDOL 7 MG: 5 TABLET ORAL at 20:43

## 2020-12-08 RX ADMIN — DOCUSATE SODIUM 100 MG: 100 CAPSULE, LIQUID FILLED ORAL at 20:44

## 2020-12-08 RX ADMIN — OLANZAPINE 10 MG: 5 TABLET, ORALLY DISINTEGRATING ORAL at 14:03

## 2020-12-08 RX ADMIN — HALOPERIDOL 5 MG: 5 TABLET ORAL at 02:34

## 2020-12-08 RX ADMIN — SALMETEROL XINAFOATE 1 PUFF: 50 POWDER, METERED ORAL; RESPIRATORY (INHALATION) at 08:44

## 2020-12-08 RX ADMIN — VENLAFAXINE HYDROCHLORIDE 75 MG: 75 CAPSULE, EXTENDED RELEASE ORAL at 08:43

## 2020-12-08 RX ADMIN — MELATONIN TAB 3 MG 3 MG: 3 TAB at 02:34

## 2020-12-08 ASSESSMENT — ACTIVITIES OF DAILY LIVING (ADL)
ADLS_ACUITY_SCORE: 12
ADLS_ACUITY_SCORE: 12
ADLS_ACUITY_SCORE: 13
ADLS_ACUITY_SCORE: 13
ADLS_ACUITY_SCORE: 12
ADLS_ACUITY_SCORE: 12

## 2020-12-08 NOTE — PLAN OF CARE
Alert and oriented to self only. VSS on RA. Up independently in room. Regular diet. Denies pain and N/V. No IV access. Code 21 called for aggression, 5 point restraints implemented and 1:1 RN sitter in place. Covid test came back negative. Psych consulted for decision making capacity. Originally supposed to discharge to group home today but insurance/payment still need to be resolved, SW following.
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
## 2020-12-08 NOTE — PLAN OF CARE
"Pt becoming more agitated, restless, and fighting restraints. Threats made by patient to writer. Spit at writer multiple times since previous note. Writer continues to question and explain why the patient is in restraints and criteria for removing them but no evidence of learning is noted. Pt was aggreeable to take PO Haldol.       Of note, pt will look to his right and begin talking. When writer questioned what he was saying the pt says \"I'm not talking to you.\" He later disclosed he was talking to a  at the bedside.     NP updated.  "
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
## 2020-12-08 NOTE — PLAN OF CARE
Pt informed of why he was in restraints and informed of the criteria needed to be removed. Pt begin spitting at writer. No evidence of learning noted.

## 2020-12-08 NOTE — PLAN OF CARE
Pt informed of why he was in restraints and informed of the criteria needed for removal of the restraints. Pt begin spitting at writer. No evidence of learning noted.

## 2020-12-08 NOTE — CONSULTS
[facility]  Psychiatry Consultation - Follow-up note      Interim History:   Follow-up requested today to comment regarding the patient's decision-making capacity.  The patient is familiar to me from prior inpatient psychiatric hospitalizations and psychiatric consultations.  I last met with the patient on September 29.  He has been awaiting bed availability at a group home however his discharge plans have become complicated by the patient's refusal to cooperate.  Documentation reflects that the patient became agitated yesterday requiring a code 21 and placement in restraints.    On examination today, the patient was resting comfortably in his room with one-to-one staff at his bedside.  The patient tells me that his mood is euthymic and he is looking forward to returning home soon.  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  He seemed to have little appreciation for how his medical conditions are impairing his functionality although he was able to explain to me that he suffered a TBI many years ago.    He denies AH/VH today.  He did not reference any paranoid delusions today.  He denied suicidal and homicidal thoughts.  He did not report any medication side effects.  He had no complaints regarding depressed or anxious mood today.           Medications:       aspirin  81 mg Oral Daily     atorvastatin  80 mg Oral At Bedtime     benztropine  0.25 mg Oral BID     docusate sodium  100 mg Oral BID     haloperidol  7 mg Oral BID     levothyroxine  88 mcg Oral Daily     memantine  5 mg Oral Daily     nicotine  1 patch Transdermal Daily     nicotine   Transdermal TID     polyethylene glycol  17 g Oral Daily     salmeterol  1 puff Inhalation BID     venlafaxine  75 mg Oral Daily with breakfast          Allergies:     Allergies   Allergen Reactions     Ibuprofen      Latex           Labs:   No  results found for this or any previous visit (from the past 24 hour(s)).       Psychiatric Examination:     /80 (BP Location: Left arm)   Pulse 77   Temp 97.5  F (36.4  C) (Oral)   Resp 18   Ht 1.829 m (6')   Wt 71 kg (156 lb 8 oz)   SpO2 99%   BMI 21.23 kg/m    Weight is 156 lbs 8 oz  Body mass index is 21.23 kg/m .  Orthostatic Vitals     None            Appearance: dressed in hospital scrubs and slightly unkempt  Attitude:  guarded and somewhat cooperative  Eye Contact:  fair  Mood:  good  Affect:  mood congruent  Speech:  clear, coherent  Psychomotor Behavior:  tremor observed   Throught Process:  tangental  Associations:  no loose associations  Thought Content:  no evidence of suicidal ideation or homicidal ideation and no evidence of psychotic thought  Insight:  limited  Judgement:  limited  Oriented to:  Person only  Attention Span and Concentration:  limited  Recent and Remote Memory:  limited           DIagnoses:     1.  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   2.  Other schizophrenia spectrum disorder.   3.  Alcohol use disorder, in remission.   4.  Stimulant use disorder, in remission.   5.  Chronic obstructive pulmonary disease.   6.  Hypertension.       Recommendations:     Continue haldol 7mg BID  Continue Effexor for mood and anxiety management.    He is under involuntary committment for treatment of mental illness while noting a prominent neurocognitive disorder.     Based on my examination today, the patient does not demonstrate decision-making capacity.  The limitations demonstrated are likely related to his neurocognitive disorder and/or TBI.  Utilizing a next of kin or court appointed decision-maker would be recommended when consent is needed for cares related to his medical treatment and disposition.    Please reconsult with Psychiatry as needed.   Ze Batres MD   Text Page
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
## 2020-12-09 PROCEDURE — 250N000013 HC RX MED GY IP 250 OP 250 PS 637: Performed by: PSYCHIATRY & NEUROLOGY

## 2020-12-09 PROCEDURE — 99231 SBSQ HOSP IP/OBS SF/LOW 25: CPT | Performed by: HOSPITALIST

## 2020-12-09 PROCEDURE — 250N000013 HC RX MED GY IP 250 OP 250 PS 637: Performed by: INTERNAL MEDICINE

## 2020-12-09 PROCEDURE — 250N000013 HC RX MED GY IP 250 OP 250 PS 637: Performed by: HOSPITALIST

## 2020-12-09 PROCEDURE — 120N000001 HC R&B MED SURG/OB

## 2020-12-09 RX ADMIN — HALOPERIDOL 7 MG: 5 TABLET ORAL at 08:08

## 2020-12-09 RX ADMIN — MELATONIN TAB 3 MG 3 MG: 3 TAB at 21:21

## 2020-12-09 RX ADMIN — HALOPERIDOL 5 MG: 5 TABLET ORAL at 12:16

## 2020-12-09 RX ADMIN — HALOPERIDOL 7 MG: 5 TABLET ORAL at 21:21

## 2020-12-09 RX ADMIN — VENLAFAXINE HYDROCHLORIDE 75 MG: 75 CAPSULE, EXTENDED RELEASE ORAL at 08:08

## 2020-12-09 RX ADMIN — POLYETHYLENE GLYCOL 3350 17 G: 17 POWDER, FOR SOLUTION ORAL at 08:08

## 2020-12-09 RX ADMIN — DOCUSATE SODIUM 100 MG: 100 CAPSULE, LIQUID FILLED ORAL at 08:08

## 2020-12-09 RX ADMIN — LEVOTHYROXINE SODIUM 88 MCG: 88 TABLET ORAL at 08:08

## 2020-12-09 RX ADMIN — Medication 0.25 MG: at 08:09

## 2020-12-09 RX ADMIN — DOCUSATE SODIUM 100 MG: 100 CAPSULE, LIQUID FILLED ORAL at 21:21

## 2020-12-09 RX ADMIN — SALMETEROL XINAFOATE 1 PUFF: 50 POWDER, METERED ORAL; RESPIRATORY (INHALATION) at 21:23

## 2020-12-09 RX ADMIN — Medication 0.25 MG: at 21:21

## 2020-12-09 RX ADMIN — SALMETEROL XINAFOATE 1 PUFF: 50 POWDER, METERED ORAL; RESPIRATORY (INHALATION) at 08:11

## 2020-12-09 RX ADMIN — HALOPERIDOL 5 MG: 5 TABLET ORAL at 18:51

## 2020-12-09 RX ADMIN — ASPIRIN 81 MG: 81 TABLET, COATED ORAL at 08:09

## 2020-12-09 RX ADMIN — MEMANTINE 5 MG: 5 TABLET ORAL at 08:09

## 2020-12-09 RX ADMIN — ATORVASTATIN CALCIUM 80 MG: 40 TABLET, FILM COATED ORAL at 21:21

## 2020-12-09 ASSESSMENT — ACTIVITIES OF DAILY LIVING (ADL)
ADLS_ACUITY_SCORE: 12
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
## 2020-12-09 NOTE — PLAN OF CARE
Alert and oriented to self only. VSS. Denies pain. Up independently in room. Haldol given x1. No aggressive outbursts today. Long arm sitter in place and door alarm on. Awaiting placement. Will continue to monitor.

## 2020-12-09 NOTE — PLAN OF CARE
Alert and oriented to self only. VSS. Denies pain. Restraints removed today at about 1200. Has been mostly good today as evening has progressed, has seemed to get more agitated and pacing despite, haldol and zyprexa. Plan to continue to monitor tonight.

## 2020-12-09 NOTE — PLAN OF CARE
A&Ox1, patient only alert to self. Up ind in room, door alarm in place. regular diet. VSS. Denies pain. No IV access, md aware. Patient having increased agitation and delusions at start of the shift. Patient stated to writer that he had just got off the phone with his sister and she was coming to get him, asked writer multiple times to get his belongings, repeatedly asked for cigarettes but declined all smoking cessation aids. After scheduled/ prn meds patient was able to sleep comfortably. Discharge pending.

## 2020-12-10 LAB — LACTATE BLD-SCNC: 2.1 MMOL/L (ref 0.7–2)

## 2020-12-10 PROCEDURE — 250N000013 HC RX MED GY IP 250 OP 250 PS 637: Performed by: HOSPITALIST

## 2020-12-10 PROCEDURE — 250N000013 HC RX MED GY IP 250 OP 250 PS 637: Performed by: INTERNAL MEDICINE

## 2020-12-10 PROCEDURE — 36415 COLL VENOUS BLD VENIPUNCTURE: CPT | Performed by: INTERNAL MEDICINE

## 2020-12-10 PROCEDURE — 99232 SBSQ HOSP IP/OBS MODERATE 35: CPT | Performed by: INTERNAL MEDICINE

## 2020-12-10 PROCEDURE — 250N000013 HC RX MED GY IP 250 OP 250 PS 637: Performed by: NURSE PRACTITIONER

## 2020-12-10 PROCEDURE — 120N000001 HC R&B MED SURG/OB

## 2020-12-10 PROCEDURE — 83605 ASSAY OF LACTIC ACID: CPT | Performed by: INTERNAL MEDICINE

## 2020-12-10 PROCEDURE — 250N000013 HC RX MED GY IP 250 OP 250 PS 637: Performed by: PSYCHIATRY & NEUROLOGY

## 2020-12-10 RX ORDER — OLANZAPINE 5 MG/1
5 TABLET ORAL 2 TIMES DAILY
Status: DISCONTINUED | OUTPATIENT
Start: 2020-12-10 | End: 2020-12-11

## 2020-12-10 RX ORDER — HALOPERIDOL 5 MG/1
10 TABLET ORAL 2 TIMES DAILY
Status: DISCONTINUED | OUTPATIENT
Start: 2020-12-10 | End: 2020-12-11

## 2020-12-10 RX ORDER — OLANZAPINE 5 MG/1
10 TABLET, ORALLY DISINTEGRATING ORAL ONCE
Status: COMPLETED | OUTPATIENT
Start: 2020-12-10 | End: 2020-12-10

## 2020-12-10 RX ADMIN — OLANZAPINE 10 MG: 5 TABLET, ORALLY DISINTEGRATING ORAL at 10:05

## 2020-12-10 RX ADMIN — SALMETEROL XINAFOATE 1 PUFF: 50 POWDER, METERED ORAL; RESPIRATORY (INHALATION) at 11:41

## 2020-12-10 RX ADMIN — VENLAFAXINE HYDROCHLORIDE 75 MG: 75 CAPSULE, EXTENDED RELEASE ORAL at 09:56

## 2020-12-10 RX ADMIN — HALOPERIDOL 5 MG: 5 TABLET ORAL at 13:58

## 2020-12-10 RX ADMIN — Medication 0.25 MG: at 09:57

## 2020-12-10 RX ADMIN — HALOPERIDOL 7 MG: 5 TABLET ORAL at 09:57

## 2020-12-10 RX ADMIN — OLANZAPINE 5 MG: 5 TABLET, FILM COATED ORAL at 21:00

## 2020-12-10 RX ADMIN — ATORVASTATIN CALCIUM 80 MG: 40 TABLET, FILM COATED ORAL at 20:59

## 2020-12-10 RX ADMIN — MEMANTINE 5 MG: 5 TABLET ORAL at 09:57

## 2020-12-10 RX ADMIN — DOCUSATE SODIUM 100 MG: 100 CAPSULE, LIQUID FILLED ORAL at 09:57

## 2020-12-10 RX ADMIN — OLANZAPINE 10 MG: 5 TABLET, ORALLY DISINTEGRATING ORAL at 16:20

## 2020-12-10 RX ADMIN — HALOPERIDOL 10 MG: 5 TABLET ORAL at 21:00

## 2020-12-10 RX ADMIN — ASPIRIN 81 MG: 81 TABLET, COATED ORAL at 09:56

## 2020-12-10 RX ADMIN — POLYETHYLENE GLYCOL 3350 17 G: 17 POWDER, FOR SOLUTION ORAL at 10:04

## 2020-12-10 RX ADMIN — DOCUSATE SODIUM 100 MG: 100 CAPSULE, LIQUID FILLED ORAL at 21:00

## 2020-12-10 RX ADMIN — LEVOTHYROXINE SODIUM 88 MCG: 88 TABLET ORAL at 09:57

## 2020-12-10 RX ADMIN — Medication 0.25 MG: at 21:00

## 2020-12-10 ASSESSMENT — ACTIVITIES OF DAILY LIVING (ADL)
ADLS_ACUITY_SCORE: 12

## 2020-12-10 NOTE — PROGRESS NOTES
"Patient with episodes x 7 of coming out of his room yelling within 1 foot at nursing staff stating \"you better go get my fucking clothes so I can get out of this [facility] place, i'm tired of telling you this! \"  Repeated redirection offering prn medication, food, calm explanation that he is to stay with us at the hospital.  Patient would get closer to staff yelling then go back in his room and slam the door.  Could hear patient throwing things in room then would be calm.  Would wait for 5-10 minutes and repeat behavior.  Gave prn medications as ordered throughout the day but behaviors escalated with more aggression.  Code 21 called when patient was close in this writer and nursing assistants face yelling.  "

## 2020-12-10 NOTE — PROGRESS NOTES
[facility]    Medicine Progress Note - Hospitalist Service       Date of Admission:  9/9/2020  Assessment & Plan   John Juárez is a 54 year old male admitted on 9/9/2020.  PMHx of schizophrenia, hx of TBI, alcohol use disorder, COPD, hyperlipidemia and hypothyroidism who was admitted on 9/9/2020 for evaluation of aggressive behavior at his group home. His medical condition remains stable, though his group home is now unwilling to take him back. Hospitalization has been prolonged while arranging for new placement.      Neurocognitive disorder dt hx of TBI and alcohol use disorder  Schizophrenia  Had been residing in group home prior to admission.  Brought to hospital for evaluation of aggressive behaviors. Group home now unwilling to take him back. Has had numerous CODE 21s called this stay. Seen by psych, meds adjusted. Is currently under civil commitment and court hold through [facility] until 7/31/21.  Patient has been undergoing paper work for funding and placement.  *Patient has been last evaluated by psych on 12/8, no recent change in medications  - Continue current psychiatric regimen: benztropine 0.25 mg twice daily, haloperidol 7 mg twice daily, memantine 5 mg daily and venlafaxine 75 mg p.o. daily, continue current meds   - PRN olanzapine and haloperidol available for agitation . Occasionally requires PRNs     ADDENDUM: RN reporting increasing use of PRNs and increasing agitation. Will consult psychiatry to reassess medication regimen    Hyperlipidemia  Continue statin and aspirin.      COPD  Not O2 dependent.  - Continue salmeterol     Hypothyroidism  Continue levothyroxine     Tobacco Use  Using nicotine patch and PRN gum     Resting tremors of upper extremities  No acute issues      Asymptomatic COVID-19 testing negative on 12/6/2020 [done for discharge planning]         Diet: Room Service  Combination Diet Regular Diet Adult; Safe Tray - NO utensils  Diet    DVT  Prophylaxis: Pneumatic Compression Devices  Valentin Catheter: not present  Code Status: Full Code      Disposition Plan   Expected discharge: Pending placement / commitment process  Entered: Clifton Saravia MD 12/10/2020, 12:07 PM       The patient's care was discussed with the Bedside Nurse and Patient.    Clifton Saravia MD  Hospitalist Service  [facility]    ______________________________________________________________________    Interval History   No acute events overnight. Patient denies any new symptoms. Denies abdominal pain, chest pain, shortness of breath. Tolerating diet.     Data reviewed today: I reviewed all medications, new labs and imaging results over the last 24 hours. I personally reviewed no images or EKG's today.    Physical Exam   Vital Signs: Temp: 95.9  F (35.5  C) Temp src: Axillary BP: 100/72 Pulse: 96   Resp: 18 SpO2: 99 % O2 Device: None (Room air)    Weight: 156 lbs 8 oz    Constitutional: Non-toxic appearing, NAD  Respiratory: Clear to auscultation bilaterally, good air movement bilaterally  Cardiovascular: RRR, no m/r/g.    GI: Soft, non-tender, non-distended.    Skin/Integumen: Warm, dry  Other:      Data   No lab results found in last 7 days.    No results found for this or any previous visit (from the past 24 hour(s)).  Medications       aspirin  81 mg Oral Daily     atorvastatin  80 mg Oral At Bedtime     benztropine  0.25 mg Oral BID     docusate sodium  100 mg Oral BID     haloperidol  7 mg Oral BID     levothyroxine  88 mcg Oral Daily     memantine  5 mg Oral Daily     nicotine  1 patch Transdermal Daily     nicotine   Transdermal TID     polyethylene glycol  17 g Oral Daily     salmeterol  1 puff Inhalation BID     venlafaxine  75 mg Oral Daily with breakfast

## 2020-12-10 NOTE — PROGRESS NOTES
MD Notification    Notified Person: MD    Notified Person Name: Dr. Saravia    Notification Date/Time: 12/10/20 3612    Notification Interaction: text page    Purpose of Notification: lactic acid level 2.1    Orders Received: I will be up to evaluate the patient.    Comments:

## 2020-12-10 NOTE — PROGRESS NOTES
Sepsis Evaluation Progress Note    I was called to see John Juárez due to abnormal vital signs triggering the Sepsis SIRS screening alert. He is not known to have an infection. Note sepsis protocol fired inappropriately, mildly tachycardic without other SIRS criteria met.     Physical Exam   Vital Signs:  Temp: 95.9  F (35.5  C) Temp src: Axillary BP: 100/72 Pulse: 96   Resp: 18 SpO2: 99 % O2 Device: None (Room air)       General: in no acute distress  Mental Status: baseline mental status.     Remainder of physical exam is significant for see progress note    Data   Lactate for Sepsis Protocol   Date Value Ref Range Status   12/10/2020 2.1 (H) 0.7 - 2.0 mmol/L Final     Comment:     Significant value called to and read back by  MARJ ROSE IN 88 AT 1132 BR         Assessment & Plan   NO EVIDENCE OF SEPSIS at this time.  Vital sign, physical exam, and lab findings are likely due to lab error.    Disposition: The patient will remain on the current unit. We will continue to monitor this patient closely..  Clifton Saravia MD    Sepsis Criteria   Sepsis: 2+ SIRS criteria due to infection  Severe Sepsis: Sepsis AND 1+ new sign of acute organ dysfunction (Note: lactate >2 or acute encephalopathy each qualify as organ dysfunction)  Septic Shock: Sepsis AND hypotension despite volume resuscitation with 30 ml/kg crystalloid or lactate >=4  Note: HYPOTENSION is defined as 2 BP readings measured 3 hrs apart that have a SBP <90, MAP <65, or decrease >40 mmHg, occurring 6 hrs before or after t-zero

## 2020-12-10 NOTE — PROVIDER NOTIFICATION
MD Notification    Notified Person: MD    Notified Person Name: Dr. Saravia    Notification Date/Time: 12/10/20 7270    Notification Interaction:  telephone    Purpose of Notification:    Increased agitation wanting to scheduled meds at 1200 and 1800    Orders Received: I will consult psych and have them adjust medicatons.    Comments:

## 2020-12-10 NOTE — PLAN OF CARE
A&O to self only. Calm for most of shift ex agitated at times & slamming doors, PRN haldol given x1 & effective. VSS on RA. Up ind in room w/ door alarm in place. Discharge pending.
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
## 2020-12-10 NOTE — PLAN OF CARE
A&Ox1. Up ind in room with door alarm in place.. Regular diet. VSS  Denies pain. No PRNS given, patient calm for most of shift, slept soundly for most of shift. Denied nicotine patch or gum.

## 2020-12-10 NOTE — PROGRESS NOTES
MD Notification    Notified Person: MD    Notified Person Name: Dr. Saravia    Notification Date/Time: 12/10/20 3398    Notification Interaction: telephone   Text page    Purpose of Notification: behaviors escalating more aggressive wanting more prn medications    Orders Received:    Comments:

## 2020-12-11 PROCEDURE — 99233 SBSQ HOSP IP/OBS HIGH 50: CPT | Mod: GT | Performed by: PSYCHIATRY & NEUROLOGY

## 2020-12-11 PROCEDURE — 250N000013 HC RX MED GY IP 250 OP 250 PS 637: Performed by: HOSPITALIST

## 2020-12-11 PROCEDURE — 99231 SBSQ HOSP IP/OBS SF/LOW 25: CPT | Performed by: INTERNAL MEDICINE

## 2020-12-11 PROCEDURE — 250N000013 HC RX MED GY IP 250 OP 250 PS 637: Performed by: PSYCHIATRY & NEUROLOGY

## 2020-12-11 PROCEDURE — 250N000013 HC RX MED GY IP 250 OP 250 PS 637: Performed by: NURSE PRACTITIONER

## 2020-12-11 PROCEDURE — 250N000013 HC RX MED GY IP 250 OP 250 PS 637: Performed by: INTERNAL MEDICINE

## 2020-12-11 PROCEDURE — 120N000001 HC R&B MED SURG/OB

## 2020-12-11 RX ORDER — HALOPERIDOL 5 MG/1
10 TABLET ORAL 3 TIMES DAILY
Status: DISCONTINUED | OUTPATIENT
Start: 2020-12-11 | End: 2021-02-24

## 2020-12-11 RX ADMIN — HALOPERIDOL 10 MG: 5 TABLET ORAL at 15:12

## 2020-12-11 RX ADMIN — POLYETHYLENE GLYCOL 3350 17 G: 17 POWDER, FOR SOLUTION ORAL at 07:59

## 2020-12-11 RX ADMIN — DOCUSATE SODIUM 100 MG: 100 CAPSULE, LIQUID FILLED ORAL at 08:01

## 2020-12-11 RX ADMIN — MELATONIN TAB 3 MG 3 MG: 3 TAB at 21:46

## 2020-12-11 RX ADMIN — ATORVASTATIN CALCIUM 80 MG: 40 TABLET, FILM COATED ORAL at 21:46

## 2020-12-11 RX ADMIN — HALOPERIDOL 10 MG: 5 TABLET ORAL at 08:01

## 2020-12-11 RX ADMIN — LEVOTHYROXINE SODIUM 88 MCG: 88 TABLET ORAL at 08:01

## 2020-12-11 RX ADMIN — MEMANTINE 5 MG: 5 TABLET ORAL at 08:01

## 2020-12-11 RX ADMIN — HALOPERIDOL 10 MG: 5 TABLET ORAL at 21:46

## 2020-12-11 RX ADMIN — SALMETEROL XINAFOATE 1 PUFF: 50 POWDER, METERED ORAL; RESPIRATORY (INHALATION) at 08:00

## 2020-12-11 RX ADMIN — OLANZAPINE 10 MG: 5 TABLET, ORALLY DISINTEGRATING ORAL at 14:28

## 2020-12-11 RX ADMIN — OLANZAPINE 5 MG: 5 TABLET, FILM COATED ORAL at 08:01

## 2020-12-11 RX ADMIN — VENLAFAXINE HYDROCHLORIDE 75 MG: 75 CAPSULE, EXTENDED RELEASE ORAL at 08:01

## 2020-12-11 RX ADMIN — DOCUSATE SODIUM 100 MG: 100 CAPSULE, LIQUID FILLED ORAL at 21:46

## 2020-12-11 RX ADMIN — Medication 0.25 MG: at 21:46

## 2020-12-11 RX ADMIN — Medication 0.25 MG: at 08:01

## 2020-12-11 RX ADMIN — ASPIRIN 81 MG: 81 TABLET, COATED ORAL at 08:01

## 2020-12-11 RX ADMIN — SALMETEROL XINAFOATE 1 PUFF: 50 POWDER, METERED ORAL; RESPIRATORY (INHALATION) at 21:47

## 2020-12-11 RX ADMIN — NICOTINE POLACRILEX 2 MG: 2 GUM, CHEWING ORAL at 14:29

## 2020-12-11 ASSESSMENT — ACTIVITIES OF DAILY LIVING (ADL)
ADLS_ACUITY_SCORE: 12

## 2020-12-11 ASSESSMENT — MIFFLIN-ST. JEOR: SCORE: 1693.55
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
## 2020-12-11 NOTE — PLAN OF CARE
Pt. A&Ox2. Regular diet, asks for cola frequently. Up Independent with door alarm on. Denies pain. Pt continues to focus on putting clothes on and leaving. Attempted to redirect pt, minimally effective. Medications changed after code 21 and escalating behaviours yesterday, pt has been sleep since taking bedtime medications. Plan for psych consult for med mngmt. Discharge awaiting safe placement, SW following.

## 2020-12-11 NOTE — PLAN OF CARE
A&Ox2. VSS on RA. Denies pain. PT declined the nicotine patch. Restless throughout the day, came out multiple times, redirectable gave prn zyprexa for increased agitation. Has been sleeping on and off throughout the day. Wanted to go out for a smoke, redirected with nicotine gum. Independent in room. Door alarm on.

## 2020-12-11 NOTE — PLAN OF CARE
A&Ox2. Up ind in room with door alarm in place.  Regular diet. VSS  Denies pain. Prn haldol given x 1, prn zyprexa given and  extra zyprexa x 1 given during code 21, patient agitation increasing throughout the shift see previous note. Denied nicotine patch or gum.  Discharge unclear at this time.
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
## 2020-12-12 LAB — LACTATE BLD-SCNC: 1.6 MMOL/L (ref 0.7–2)

## 2020-12-12 PROCEDURE — 250N000013 HC RX MED GY IP 250 OP 250 PS 637: Performed by: HOSPITALIST

## 2020-12-12 PROCEDURE — 99231 SBSQ HOSP IP/OBS SF/LOW 25: CPT | Performed by: INTERNAL MEDICINE

## 2020-12-12 PROCEDURE — 250N000013 HC RX MED GY IP 250 OP 250 PS 637: Performed by: PSYCHIATRY & NEUROLOGY

## 2020-12-12 PROCEDURE — 83605 ASSAY OF LACTIC ACID: CPT | Performed by: INTERNAL MEDICINE

## 2020-12-12 PROCEDURE — 36415 COLL VENOUS BLD VENIPUNCTURE: CPT | Performed by: INTERNAL MEDICINE

## 2020-12-12 PROCEDURE — 120N000001 HC R&B MED SURG/OB

## 2020-12-12 PROCEDURE — 250N000013 HC RX MED GY IP 250 OP 250 PS 637: Performed by: NURSE PRACTITIONER

## 2020-12-12 PROCEDURE — 250N000013 HC RX MED GY IP 250 OP 250 PS 637: Performed by: INTERNAL MEDICINE

## 2020-12-12 RX ADMIN — HALOPERIDOL 10 MG: 5 TABLET ORAL at 20:15

## 2020-12-12 RX ADMIN — ASPIRIN 81 MG: 81 TABLET, COATED ORAL at 09:23

## 2020-12-12 RX ADMIN — HALOPERIDOL 10 MG: 5 TABLET ORAL at 15:35

## 2020-12-12 RX ADMIN — LEVOTHYROXINE SODIUM 88 MCG: 88 TABLET ORAL at 09:23

## 2020-12-12 RX ADMIN — DOCUSATE SODIUM 100 MG: 100 CAPSULE, LIQUID FILLED ORAL at 20:15

## 2020-12-12 RX ADMIN — HALOPERIDOL 5 MG: 5 TABLET ORAL at 02:46

## 2020-12-12 RX ADMIN — DOCUSATE SODIUM 100 MG: 100 CAPSULE, LIQUID FILLED ORAL at 09:23

## 2020-12-12 RX ADMIN — POLYETHYLENE GLYCOL 3350 17 G: 17 POWDER, FOR SOLUTION ORAL at 09:23

## 2020-12-12 RX ADMIN — SALMETEROL XINAFOATE 1 PUFF: 50 POWDER, METERED ORAL; RESPIRATORY (INHALATION) at 09:24

## 2020-12-12 RX ADMIN — Medication 0.25 MG: at 20:15

## 2020-12-12 RX ADMIN — Medication 0.25 MG: at 09:24

## 2020-12-12 RX ADMIN — HALOPERIDOL 10 MG: 5 TABLET ORAL at 09:23

## 2020-12-12 RX ADMIN — VENLAFAXINE HYDROCHLORIDE 75 MG: 75 CAPSULE, EXTENDED RELEASE ORAL at 09:23

## 2020-12-12 RX ADMIN — OLANZAPINE 10 MG: 5 TABLET, ORALLY DISINTEGRATING ORAL at 16:14

## 2020-12-12 RX ADMIN — NICOTINE POLACRILEX 2 MG: 2 GUM, CHEWING ORAL at 16:14

## 2020-12-12 RX ADMIN — ATORVASTATIN CALCIUM 80 MG: 40 TABLET, FILM COATED ORAL at 20:15

## 2020-12-12 RX ADMIN — MEMANTINE 5 MG: 5 TABLET ORAL at 09:23

## 2020-12-12 RX ADMIN — OLANZAPINE 10 MG: 5 TABLET, ORALLY DISINTEGRATING ORAL at 01:36

## 2020-12-12 ASSESSMENT — ACTIVITIES OF DAILY LIVING (ADL)
ADLS_ACUITY_SCORE: 12

## 2020-12-12 NOTE — PROGRESS NOTES
[facility]    Medicine Progress Note - Hospitalist Service       Date of Admission:  9/9/2020  Assessment & Plan   John Juárez is a 54 year old male admitted on 9/9/2020.  PMHx of schizophrenia, hx of TBI, alcohol use disorder, COPD, hyperlipidemia and hypothyroidism who was admitted on 9/9/2020 for evaluation of aggressive behavior at his group home. His medical condition remains stable, though his group home is now unwilling to take him back. Hospitalization has been prolonged while arranging for new placement.      Neurocognitive disorder dt hx of TBI and alcohol use disorder  Schizophrenia  Had been residing in group home prior to admission.  Brought to hospital for evaluation of aggressive behaviors. Group home now unwilling to take him back. Has had numerous CODE 21s called this stay. Seen by psych, meds adjusted. Is currently under civil commitment and court hold through [facility] until 7/31/21.  Patient has been undergoing paper work for funding and placement.  - Psychiatry re-consulted 12/11, increased haloperidol to 10 mg TID. Continue.   - Continue PRN regimen   - Code 21 if escalating behaviors, has been successfully de-escalated with Code 21 security team    Hyperlipidemia  Continue statin and aspirin.      COPD  Not O2 dependent.  - Continue salmeterol     Hypothyroidism  Continue levothyroxine     Tobacco Use  Using nicotine patch and PRN gum     Resting tremors of upper extremities  No acute issues      Asymptomatic COVID-19 testing negative on 12/6/2020 [done for discharge planning]     Diet: Room Service  Combination Diet Regular Diet Adult; Safe Tray - NO utensils  Diet    DVT Prophylaxis: Pneumatic Compression Devices  Valentin Catheter: not present  Code Status: Full Code      Disposition Plan   Expected discharge: Pending placement / commitment process  Entered: Clifton Saravia MD 12/12/2020, 2:40 PM       The patient's care was discussed with the Bedside  Nurse and Patient.    Clifton Saravia MD  Hospitalist Service  [facility]    ______________________________________________________________________    Interval History   No acute events overnight. Denies any new symptoms.     Data reviewed today: I reviewed all medications, new labs and imaging results over the last 24 hours. I personally reviewed no images or EKG's today.    Physical Exam   Vital Signs: Temp: 96.3  F (35.7  C) Temp src: Oral BP: 92/65 Pulse: 82   Resp: 16 SpO2: 98 % O2 Device: None (Room air)    Weight: 182 lbs 0 oz    Constitutional: Non-toxic appearing, NAD  Respiratory:    Cardiovascular:    GI:    Skin/Integumen:    Neuro/psych:  Alert.     Data   No lab results found in last 7 days.    No results found for this or any previous visit (from the past 24 hour(s)).  Medications       aspirin  81 mg Oral Daily     atorvastatin  80 mg Oral At Bedtime     benztropine  0.25 mg Oral BID     docusate sodium  100 mg Oral BID     haloperidol  10 mg Oral TID     levothyroxine  88 mcg Oral Daily     memantine  5 mg Oral Daily     nicotine  1 patch Transdermal Daily     nicotine   Transdermal TID     polyethylene glycol  17 g Oral Daily     salmeterol  1 puff Inhalation BID     venlafaxine  75 mg Oral Daily with breakfast

## 2020-12-12 NOTE — PLAN OF CARE
"Pt up most of the night, finally settled in to bed at 0430. Increasing agitation, coming out of room looking for his \"duffle bags filled with cash\" and going back to room and slamming door shut- gave prn zyprexa and prn haldol x1, improved some what. Orientated to self only. Independent in room. Door alarm in place.   "

## 2020-12-13 PROCEDURE — 250N000013 HC RX MED GY IP 250 OP 250 PS 637: Performed by: PSYCHIATRY & NEUROLOGY

## 2020-12-13 PROCEDURE — 99231 SBSQ HOSP IP/OBS SF/LOW 25: CPT | Performed by: INTERNAL MEDICINE

## 2020-12-13 PROCEDURE — 250N000013 HC RX MED GY IP 250 OP 250 PS 637: Performed by: INTERNAL MEDICINE

## 2020-12-13 PROCEDURE — 250N000013 HC RX MED GY IP 250 OP 250 PS 637: Performed by: HOSPITALIST

## 2020-12-13 PROCEDURE — 120N000001 HC R&B MED SURG/OB

## 2020-12-13 RX ADMIN — Medication 0.25 MG: at 08:11

## 2020-12-13 RX ADMIN — OLANZAPINE 10 MG: 5 TABLET, ORALLY DISINTEGRATING ORAL at 18:39

## 2020-12-13 RX ADMIN — HALOPERIDOL 10 MG: 5 TABLET ORAL at 21:04

## 2020-12-13 RX ADMIN — DOCUSATE SODIUM 100 MG: 100 CAPSULE, LIQUID FILLED ORAL at 21:04

## 2020-12-13 RX ADMIN — Medication 0.25 MG: at 21:04

## 2020-12-13 RX ADMIN — HALOPERIDOL 10 MG: 5 TABLET ORAL at 08:11

## 2020-12-13 RX ADMIN — SALMETEROL XINAFOATE 1 PUFF: 50 POWDER, METERED ORAL; RESPIRATORY (INHALATION) at 21:04

## 2020-12-13 RX ADMIN — HALOPERIDOL 10 MG: 5 TABLET ORAL at 15:01

## 2020-12-13 RX ADMIN — ATORVASTATIN CALCIUM 80 MG: 40 TABLET, FILM COATED ORAL at 21:04

## 2020-12-13 RX ADMIN — VENLAFAXINE HYDROCHLORIDE 75 MG: 75 CAPSULE, EXTENDED RELEASE ORAL at 08:11

## 2020-12-13 RX ADMIN — ASPIRIN 81 MG: 81 TABLET, COATED ORAL at 08:11

## 2020-12-13 RX ADMIN — MEMANTINE 5 MG: 5 TABLET ORAL at 08:11

## 2020-12-13 RX ADMIN — LEVOTHYROXINE SODIUM 88 MCG: 88 TABLET ORAL at 08:11

## 2020-12-13 RX ADMIN — DOCUSATE SODIUM 100 MG: 100 CAPSULE, LIQUID FILLED ORAL at 08:11

## 2020-12-13 RX ADMIN — OLANZAPINE 10 MG: 5 TABLET, ORALLY DISINTEGRATING ORAL at 13:39

## 2020-12-13 RX ADMIN — SALMETEROL XINAFOATE 1 PUFF: 50 POWDER, METERED ORAL; RESPIRATORY (INHALATION) at 08:11

## 2020-12-13 RX ADMIN — POLYETHYLENE GLYCOL 3350 17 G: 17 POWDER, FOR SOLUTION ORAL at 08:11

## 2020-12-13 ASSESSMENT — ACTIVITIES OF DAILY LIVING (ADL)
ADLS_ACUITY_SCORE: 12

## 2020-12-13 NOTE — PLAN OF CARE
A&Ox1-2. VSS on RA. Denies pain. Slept on and off. Regular diet tolerating well. Gave prn zyprexa for increased agitation and nicotine gum to help redirect patient from wanting to go outside and have a cigarette. Independent in room. Door alarm in place.

## 2020-12-13 NOTE — PROGRESS NOTES
[facility]    Medicine Progress Note - Hospitalist Service       Date of Admission:  9/9/2020  Assessment & Plan   John Juárez is a 54 year old male admitted on 9/9/2020.  PMHx of schizophrenia, hx of TBI, alcohol use disorder, COPD, hyperlipidemia and hypothyroidism who was admitted on 9/9/2020 for evaluation of aggressive behavior at his group home. His medical condition remains stable, though his group home is now unwilling to take him back. Hospitalization has been prolonged while arranging for new placement.      Neurocognitive disorder dt hx of TBI and alcohol use disorder  Schizophrenia  Had been residing in group home prior to admission.  Brought to hospital for evaluation of aggressive behaviors. Group home now unwilling to take him back. Has had numerous CODE 21s called this stay. Seen by psych, meds adjusted. Is currently under civil commitment and court hold through [facility] until 7/31/21.  Patient has been undergoing paper work for funding and placement.  - Psychiatry re-consulted 12/11, increased haloperidol to 10 mg TID. Continue.   - Continue PRN regimen including olanzapine ODT  - Code 21 if escalating behaviors, has been successfully de-escalated with Code 21 security team    Hyperlipidemia  Continue statin and aspirin.      COPD  Not O2 dependent.  - Continue salmeterol     Hypothyroidism  Continue levothyroxine     Tobacco Use  Using nicotine patch and PRN gum     Resting tremors of upper extremities  No acute issues      Asymptomatic COVID-19 testing negative on 12/6/2020 [done for discharge planning]     Diet: Room Service  Combination Diet Regular Diet Adult; Safe Tray - NO utensils  Diet    DVT Prophylaxis: Pneumatic Compression Devices  Valentin Catheter: not present  Code Status: Full Code      Disposition Plan   Expected discharge: Pending placement / commitment process  Entered: Clifton Saravia MD 12/13/2020, 1:32 PM       The patient's care was  discussed with the Patient.    Clifton Saravia MD  Hospitalist Service  [facility]    ______________________________________________________________________    Interval History   No acute events overnight. Denies any new symptoms.     Data reviewed today: I reviewed all medications, new labs and imaging results over the last 24 hours. I personally reviewed no images or EKG's today.    Physical Exam   Vital Signs: Temp: 97.7  F (36.5  C) Temp src: Oral BP: 101/69 Pulse: 96   Resp: 16 SpO2: 97 % O2 Device: None (Room air)    Weight: 182 lbs 0 oz    Constitutional: Non-toxic appearing, NAD  Respiratory:    Cardiovascular:    GI:    Skin/Integumen:    Neuro/psych:  Alert.     Data   No lab results found in last 7 days.    No results found for this or any previous visit (from the past 24 hour(s)).  Medications       aspirin  81 mg Oral Daily     atorvastatin  80 mg Oral At Bedtime     benztropine  0.25 mg Oral BID     docusate sodium  100 mg Oral BID     haloperidol  10 mg Oral TID     levothyroxine  88 mcg Oral Daily     memantine  5 mg Oral Daily     nicotine  1 patch Transdermal Daily     nicotine   Transdermal TID     polyethylene glycol  17 g Oral Daily     salmeterol  1 puff Inhalation BID     venlafaxine  75 mg Oral Daily with breakfast

## 2020-12-13 NOTE — PROGRESS NOTES
A&O to self and place, forgetful. Regular diet. Up IND in room. Very pleasant overnight. No IV access. Continent. Discharge pending placement.

## 2020-12-13 NOTE — PLAN OF CARE
A&Ox2, disoriented to situation and time, forgetful. Increasingly more agitated throughout the day, prn zyprexa given. Wanting to go out for a smoke, declining nicotine gum and patch. VSS on RA. Denies pain. Regular diet tolerating well. No IV access, MD aware.. Independent in room.

## 2020-12-14 PROCEDURE — 250N000013 HC RX MED GY IP 250 OP 250 PS 637: Performed by: INTERNAL MEDICINE

## 2020-12-14 PROCEDURE — 250N000013 HC RX MED GY IP 250 OP 250 PS 637: Performed by: HOSPITALIST

## 2020-12-14 PROCEDURE — 250N000013 HC RX MED GY IP 250 OP 250 PS 637: Performed by: NURSE PRACTITIONER

## 2020-12-14 PROCEDURE — 99231 SBSQ HOSP IP/OBS SF/LOW 25: CPT | Performed by: HOSPITALIST

## 2020-12-14 PROCEDURE — 250N000013 HC RX MED GY IP 250 OP 250 PS 637: Performed by: PSYCHIATRY & NEUROLOGY

## 2020-12-14 PROCEDURE — 120N000001 HC R&B MED SURG/OB

## 2020-12-14 RX ADMIN — DOCUSATE SODIUM 100 MG: 100 CAPSULE, LIQUID FILLED ORAL at 09:30

## 2020-12-14 RX ADMIN — SALMETEROL XINAFOATE 1 PUFF: 50 POWDER, METERED ORAL; RESPIRATORY (INHALATION) at 09:31

## 2020-12-14 RX ADMIN — OLANZAPINE 10 MG: 5 TABLET, ORALLY DISINTEGRATING ORAL at 12:38

## 2020-12-14 RX ADMIN — ASPIRIN 81 MG: 81 TABLET, COATED ORAL at 09:31

## 2020-12-14 RX ADMIN — OLANZAPINE 5 MG: 5 TABLET, ORALLY DISINTEGRATING ORAL at 17:17

## 2020-12-14 RX ADMIN — LEVOTHYROXINE SODIUM 88 MCG: 88 TABLET ORAL at 09:29

## 2020-12-14 RX ADMIN — ATORVASTATIN CALCIUM 80 MG: 40 TABLET, FILM COATED ORAL at 21:32

## 2020-12-14 RX ADMIN — HALOPERIDOL 10 MG: 5 TABLET ORAL at 17:17

## 2020-12-14 RX ADMIN — MEMANTINE 5 MG: 5 TABLET ORAL at 09:31

## 2020-12-14 RX ADMIN — Medication 0.25 MG: at 09:30

## 2020-12-14 RX ADMIN — Medication 0.25 MG: at 21:32

## 2020-12-14 RX ADMIN — POLYETHYLENE GLYCOL 3350 17 G: 17 POWDER, FOR SOLUTION ORAL at 09:28

## 2020-12-14 RX ADMIN — DOCUSATE SODIUM 100 MG: 100 CAPSULE, LIQUID FILLED ORAL at 21:32

## 2020-12-14 RX ADMIN — NICOTINE POLACRILEX 2 MG: 2 GUM, CHEWING ORAL at 17:19

## 2020-12-14 RX ADMIN — HALOPERIDOL 10 MG: 5 TABLET ORAL at 21:32

## 2020-12-14 RX ADMIN — HALOPERIDOL 10 MG: 5 TABLET ORAL at 09:32

## 2020-12-14 RX ADMIN — VENLAFAXINE HYDROCHLORIDE 75 MG: 75 CAPSULE, EXTENDED RELEASE ORAL at 09:31

## 2020-12-14 ASSESSMENT — ACTIVITIES OF DAILY LIVING (ADL)
ADLS_ACUITY_SCORE: 12

## 2020-12-14 NOTE — PROGRESS NOTES
Care Management Follow Up    Length of Stay (days): 86    Expected Discharge Date: 12/16/20     Concerns to be Addressed: discharge planning     Patient plan of care discussed at interdisciplinary rounds: Yes    Anticipated Discharge Disposition: Group Home     Anticipated Discharge Services:    Anticipated Discharge DME: None    Patient/family educated on Medicare website which has current facility and service quality ratings:    Education Provided on the Discharge Plan:    Patient/Family in Agreement with the Plan:      Referrals Placed by CM/SW: Post Acute Facilities  Private pay costs discussed:     Additional Information:  Patient does not have a family member who is willing to be the [facility].  [facility] Attorney's office is recommending the hospital serve as the petitioner.   This is being discussed with Leadership.      Lulu Contreras, ASIMSW

## 2020-12-14 NOTE — PROGRESS NOTES
[facility]    Medicine Progress Note - Hospitalist Service       Date of Admission:  9/9/2020  Assessment & Plan   John Juárez is a 54 year old male admitted on 9/9/2020.  PMHx of schizophrenia, hx of TBI, alcohol use disorder, COPD, hyperlipidemia and hypothyroidism who was admitted on 9/9/2020 for evaluation of aggressive behavior at his group home. His medical condition remains stable, though his group home is now unwilling to take him back. Hospitalization has been prolonged while arranging for new placement.      Neurocognitive disorder dt hx of TBI and alcohol use disorder  Schizophrenia  Had been residing in group home prior to admission.  Brought to hospital for evaluation of aggressive behaviors. Group home now unwilling to take him back. Has had numerous CODE 21s called this stay. Seen by psych, meds adjusted. Is currently under civil commitment and court hold through [facility] until 7/31/21.  Patient has been undergoing paper work for funding and placement.  - Psychiatry re-consulted 12/11, increased haloperidol to 10 mg TID. Continue.   - Continue PRN regimen including olanzapine ODT  - Code 21 if escalating behaviors, has been successfully de-escalated with Code 21 security team    Hyperlipidemia  Continue statin and aspirin.      COPD  Not O2 dependent.  - Continue salmeterol     Hypothyroidism  Continue levothyroxine     Tobacco Use  Using nicotine patch and PRN gum     Resting tremors of upper extremities  No acute issues      Asymptomatic COVID-19 testing negative on 12/6/2020 [done for discharge planning]     Diet: Room Service  Combination Diet Regular Diet Adult; Safe Tray - NO utensils  Diet    DVT Prophylaxis: Pneumatic Compression Devices  Valentin Catheter: not present  Code Status: Full Code      Disposition Plan   Expected discharge: Pending placement / commitment process  Entered: Bora Walls,  12/14/2020, 4:25 PM       The patient's care was  discussed with the Patient.    Bora Walls, DO  Hospitalist Service  [facility]    ______________________________________________________________________    Interval History   No acute events overnight. Denies any new symptoms.     Data reviewed today: I reviewed all medications, new labs and imaging results over the last 24 hours. I personally reviewed no images or EKG's today.    Physical Exam   Vital Signs: Temp: 97.1  F (36.2  C) Temp src: Oral BP: 108/68 Pulse: 92   Resp: 16 SpO2: 96 % O2 Device: None (Room air)    Weight: 182 lbs 0 oz    Constitutional: Non-toxic appearing, NAD  Respiratory:    Cardiovascular:    GI:    Skin/Integumen:    Neuro/psych:  Alert.     Data   No lab results found in last 7 days.    No results found for this or any previous visit (from the past 24 hour(s)).  Medications       aspirin  81 mg Oral Daily     atorvastatin  80 mg Oral At Bedtime     benztropine  0.25 mg Oral BID     docusate sodium  100 mg Oral BID     haloperidol  10 mg Oral TID     levothyroxine  88 mcg Oral Daily     memantine  5 mg Oral Daily     nicotine  1 patch Transdermal Daily     nicotine   Transdermal TID     polyethylene glycol  17 g Oral Daily     salmeterol  1 puff Inhalation BID     venlafaxine  75 mg Oral Daily with breakfast

## 2020-12-14 NOTE — PROGRESS NOTES
A&Ox2, disoriented to situation and time, forgetful. Pleasant/calm overnight, no PRNs needed. Regular diet, good appetite. Up independently in room. No IV access. Door alarm in place. Discharge pending placement.

## 2020-12-15 ENCOUNTER — APPOINTMENT (OUTPATIENT)
Dept: OCCUPATIONAL THERAPY | Facility: CLINIC | Age: 55
DRG: 884 | End: 2020-12-15
Attending: HOSPITALIST
Payer: COMMERCIAL

## 2020-12-15 PROCEDURE — 250N000013 HC RX MED GY IP 250 OP 250 PS 637: Performed by: PSYCHIATRY & NEUROLOGY

## 2020-12-15 PROCEDURE — 250N000013 HC RX MED GY IP 250 OP 250 PS 637: Performed by: INTERNAL MEDICINE

## 2020-12-15 PROCEDURE — 97535 SELF CARE MNGMENT TRAINING: CPT | Mod: GO

## 2020-12-15 PROCEDURE — 250N000013 HC RX MED GY IP 250 OP 250 PS 637: Performed by: HOSPITALIST

## 2020-12-15 PROCEDURE — 99233 SBSQ HOSP IP/OBS HIGH 50: CPT | Performed by: PSYCHIATRY & NEUROLOGY

## 2020-12-15 PROCEDURE — 99231 SBSQ HOSP IP/OBS SF/LOW 25: CPT | Performed by: HOSPITALIST

## 2020-12-15 PROCEDURE — 120N000001 HC R&B MED SURG/OB

## 2020-12-15 PROCEDURE — 97166 OT EVAL MOD COMPLEX 45 MIN: CPT | Mod: GO

## 2020-12-15 RX ORDER — LORAZEPAM 1 MG/1
1 TABLET ORAL 3 TIMES DAILY PRN
Status: DISCONTINUED | OUTPATIENT
Start: 2020-12-15 | End: 2021-04-29

## 2020-12-15 RX ADMIN — SALMETEROL XINAFOATE 1 PUFF: 50 POWDER, METERED ORAL; RESPIRATORY (INHALATION) at 09:15

## 2020-12-15 RX ADMIN — LEVOTHYROXINE SODIUM 88 MCG: 88 TABLET ORAL at 10:25

## 2020-12-15 RX ADMIN — ATORVASTATIN CALCIUM 80 MG: 40 TABLET, FILM COATED ORAL at 22:12

## 2020-12-15 RX ADMIN — MEMANTINE 5 MG: 5 TABLET ORAL at 10:24

## 2020-12-15 RX ADMIN — HALOPERIDOL 10 MG: 5 TABLET ORAL at 10:24

## 2020-12-15 RX ADMIN — HALOPERIDOL 10 MG: 5 TABLET ORAL at 15:08

## 2020-12-15 RX ADMIN — SALMETEROL XINAFOATE 1 PUFF: 50 POWDER, METERED ORAL; RESPIRATORY (INHALATION) at 22:12

## 2020-12-15 RX ADMIN — DOCUSATE SODIUM 100 MG: 100 CAPSULE, LIQUID FILLED ORAL at 22:12

## 2020-12-15 RX ADMIN — ASPIRIN 81 MG: 81 TABLET, COATED ORAL at 10:25

## 2020-12-15 RX ADMIN — DOCUSATE SODIUM 100 MG: 100 CAPSULE, LIQUID FILLED ORAL at 10:25

## 2020-12-15 RX ADMIN — POLYETHYLENE GLYCOL 3350 17 G: 17 POWDER, FOR SOLUTION ORAL at 10:22

## 2020-12-15 RX ADMIN — HALOPERIDOL 10 MG: 5 TABLET ORAL at 22:13

## 2020-12-15 RX ADMIN — Medication 0.25 MG: at 22:12

## 2020-12-15 RX ADMIN — OLANZAPINE 10 MG: 5 TABLET, ORALLY DISINTEGRATING ORAL at 15:08

## 2020-12-15 RX ADMIN — VENLAFAXINE HYDROCHLORIDE 75 MG: 75 CAPSULE, EXTENDED RELEASE ORAL at 10:24

## 2020-12-15 RX ADMIN — Medication 0.25 MG: at 10:25

## 2020-12-15 ASSESSMENT — ACTIVITIES OF DAILY LIVING (ADL)
ADLS_ACUITY_SCORE: 11
ADLS_ACUITY_SCORE: 11
ADLS_ACUITY_SCORE: 12
ADLS_ACUITY_SCORE: 12
ADLS_ACUITY_SCORE: 11
ADLS_ACUITY_SCORE: 12

## 2020-12-15 NOTE — PROGRESS NOTES
[facility]    Medicine Progress Note - Hospitalist Service       Date of Admission:  9/9/2020  Assessment & Plan   John Juárez is a 54 year old male admitted on 9/9/2020.  PMHx of schizophrenia, hx of TBI, alcohol use disorder, COPD, hyperlipidemia and hypothyroidism who was admitted on 9/9/2020 for evaluation of aggressive behavior at his group home. His medical condition remains stable, though his group home is now unwilling to take him back. Hospitalization has been prolonged while arranging for new placement.      Neurocognitive disorder dt hx of TBI and alcohol use disorder  Schizophrenia  Had been residing in group home prior to admission.  Brought to hospital for evaluation of aggressive behaviors. Group home now unwilling to take him back. Has had numerous CODE 21s called this stay. Seen by psych, meds adjusted. Is currently under civil commitment and court hold through [facility] until 7/31/21.  Patient has been undergoing paper work for funding and placement.  - Psychiatry re-consulted 12/11, increased haloperidol to 10 mg TID. Continue.   - Continue PRN regimen including olanzapine ODT  - Code 21 if escalating behaviors, has been successfully de-escalated with Code 21 security team    Hyperlipidemia  Continue statin and aspirin.      COPD  Not O2 dependent.  - Continue salmeterol     Hypothyroidism  Continue levothyroxine     Tobacco Use  Using nicotine patch and PRN gum     Resting tremors of upper extremities  No acute issues      Asymptomatic COVID-19 testing negative on 12/6/2020 [done for discharge planning]     Diet: Room Service  Combination Diet Regular Diet Adult; Safe Tray - NO utensils  Diet    DVT Prophylaxis: Pneumatic Compression Devices  Valentin Catheter: not present  Code Status: Full Code      Disposition Plan   Expected discharge: Pending placement / commitment process  Entered: Bora Walls,  12/15/2020, 4:23 PM       The patient's care was  discussed with the Patient.    Bora Walls,   Hospitalist Service  [facility]    ______________________________________________________________________    Interval History   No acute events overnight. Denies any new symptoms.     Data reviewed today: I reviewed all medications, new labs and imaging results over the last 24 hours. I personally reviewed no images or EKG's today.    Physical Exam   Vital Signs: Temp: 97  F (36.1  C) Temp src: Oral BP: 100/69 Pulse: 95   Resp: 16 SpO2: 97 % O2 Device: None (Room air)    Weight: 182 lbs 0 oz    Constitutional: Non-toxic appearing, NAD  Respiratory:    Cardiovascular:    GI:    Skin/Integumen:    Neuro/psych:  Alert.     Data   No lab results found in last 7 days.    No results found for this or any previous visit (from the past 24 hour(s)).  Medications       aspirin  81 mg Oral Daily     atorvastatin  80 mg Oral At Bedtime     benztropine  0.25 mg Oral BID     docusate sodium  100 mg Oral BID     haloperidol  10 mg Oral TID     levothyroxine  88 mcg Oral Daily     memantine  5 mg Oral Daily     nicotine  1 patch Transdermal Daily     nicotine   Transdermal TID     polyethylene glycol  17 g Oral Daily     salmeterol  1 puff Inhalation BID     venlafaxine  75 mg Oral Daily with breakfast

## 2020-12-15 NOTE — PLAN OF CARE
Occupational Therapy Discharge Summary    Reason for therapy discharge:    All goals and outcomes met, no further needs identified.    Progress towards therapy goal(s). See goals on Care Plan in [facility] electronic health record for goal details.  Goals met    Therapy recommendation(s):    No further therapy is recommended.

## 2020-12-15 NOTE — PLAN OF CARE
Patient is alert to self only. Flat affect and illogical speech. Agitated last night at start of shift. Takes pills w/ water or pop. Denied pain. Independent in room, but impulsive and requires door alarm. Discharge pending.

## 2020-12-15 NOTE — PLAN OF CARE
Shift note:VSS, no pain, Fair day. PRN's x2 today, pt obviously hallucinating, do NOT argue. A/O person and place, likes snacks, likes pop. Takes pills well w/water or pop, indep, but known to make it down the stairs.

## 2020-12-15 NOTE — PROGRESS NOTES
Care Management Follow Up    Length of Stay (days): 87    Expected Discharge Date: 12/16/20     Concerns to be Addressed: discharge planning     Patient plan of care discussed at interdisciplinary rounds: Yes    Anticipated Discharge Disposition: Group Home     Anticipated Discharge Services:    Anticipated Discharge DME: None    Patient/family educated on Medicare website which has current facility and service quality ratings:    Education Provided on the Discharge Plan:    Patient/Family in Agreement with the Plan:      Referrals Placed by CM/SW: Post Acute Facilities  Private pay costs discussed: Not applicable    Additional Information:  Writer has requested OT evaluation for cognition.  This evaluation will be part of the clinical data to support the guardianship petition.      Lulu Contreras, ASIMSW

## 2020-12-15 NOTE — CONSULTS
[facility] Psychiatric Consult Progress Note    Interval History:   Pt seen, chart reviewed, case discussed with nursing staff and treating clinicians. Psychiatry was reconsult to today because the patient continues to have intermittent episodes of agitation and irritability despite getting olanzapine 10 mg twice daily when necessary in addition to Haldol total daily dosing 30 mg a day. I am told that securing placement is very difficult given the patient's behavioral issues that are occurring on station 88. Patient remains on a court hold and a commitment without any confirmed plan for discharge. Nursing staff reports he continues to have evidence of mental illness. At times it seems like he is responding to internal stimuli severely believe he is still having hallucinations despite the high dosing antipsychotics were already given him.    On my interview the patient's quite irritable, reporting he is very frustrated being in the hospital so long. Reports he's sleeping overnight. He reports his intact appetite. He states that he has severe anxiety. He feels as physically as well as with excessive worries. He denies that he has tendency to feel restless and need to be walking around however. Nursing is not seeing any akathisia per se. He reports he has intact bowel and bladder functioning. He denies suicidal or homicidal ideation.     Review of systems:   10 point Review of Systems completed by Lio Murrieta MD and is negative other than noted in the HPI     Medications:       aspirin  81 mg Oral Daily     atorvastatin  80 mg Oral At Bedtime     benztropine  0.25 mg Oral BID     docusate sodium  100 mg Oral BID     haloperidol  10 mg Oral TID     levothyroxine  88 mcg Oral Daily     memantine  5 mg Oral Daily     nicotine  1 patch Transdermal Daily     nicotine   Transdermal TID     polyethylene glycol  17 g Oral Daily     salmeterol  1 puff Inhalation BID     venlafaxine  75 mg Oral Daily with  breakfast     acetaminophen, albuterol, alum & mag hydroxide-simethicone, haloperidol, melatonin, naloxone **OR** naloxone **OR** naloxone **OR** naloxone, nicotine, OLANZapine, OLANZapine zydis, ondansetron **OR** ondansetron    Mental Status Examination:   Appearance:  awake, alert  Eye Contact:  good  Speech:  grumbles but coherent; calm, non-pressured  Language: normal  Psychomotor Behavior:  no evidence of tardive dyskinesia, dystonia, or tics  Mood:  Angry, frustrated  Affect: irritable  Thought Process:  tangential  Thought Content:  no evidence of suicidal ideation; no evidence of homicidal ideation; no observation of response to internal stimuli  Oriented to:  self  Attention Span and Concentration:  limited  Recent and Remote Memory:  poor  Insight:  poor  Judgment:  poor        Labs/Vitals:     No results found for this or any previous visit (from the past 24 hour(s)).  B/P: 106/74, T: 96.4, P: 79, R: 18        Diagnoses:   1.  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   2.  Other schizophrenia spectrum disorder.   3.  Alcohol use disorder, in remission.   4.  Stimulant use disorder, in remission.   5.  Chronic obstructive pulmonary disease.   6.  Hypertension.       Recommendations:   1. Start Ativan 1 mg TID PRN for breakthrough agitation. Reviewed with nursing gait will need to be monitored after taking dose as he may become fall risk. However patient reporting severe anxiety despite high dosing haldol/olanzapine.   2. Remains under commitment; awaiting placement  3. I recommend the [facility] hospital consider inpatient psychiatry placement, perhaps [facility] bed would be most appropriate given how challenging it has been to stabilize his behavioral agitation/psychosis on station 88 and outside facilities are not willing to take him with these persistent behaviors. We need to consider Depakote trial if this has not yet been attempted previously and if Depakote has already  been attempted and clozaril has not been attempted previously Clozaril would be a reasonable next option. Even a cautious trial of stimulant would be a consideration while in inpatient psychiatry for impulsivity related to prior TBI. It would be appreciated if primary team could determine if clozaril or Depakote has yet been tried by obtaining outside records, obtaining collateral by speaking to outpatient providers, etc.  4. Reconsult psychiatry as needed      Attestation:  Patient has been seen and evaluated by me,  Lio Murrieta MD

## 2020-12-15 NOTE — PROGRESS NOTES
12/15/20 1000   Quick Adds   Type of Visit Initial Occupational Therapy Evaluation       Present no   Living Environment   People in home facility resident   Current Living Arrangements group home   Home Accessibility no concerns   Living Environment Comments Per chart, pt was living in a group home, but due to agressive behaviors, pt will not be able to return. Pt reports he drives, this is not confirmed. Pt did not provided living hx information.   Disability/Function   Hearing Difficulty or Deaf no   Wear Glasses or Blind no   Difficulty Communicating yes   Communication difficulty speaking   Difficulty Eating/Swallowing no   Walking or Climbing Stairs Difficulty no   Dressing/Bathing Difficulty no   Toileting issues no   Doing Errands Independently Difficulty (such as shopping) yes   Change in Functional Status Since Onset of Current Illness/Injury yes   General Information   Onset of Illness/Injury or Date of Surgery 09/09/20   Referring Physician Bora Walls,    Patient/Family Therapy Goal Statement (OT) not stated   Additional Occupational Profile Info/Pertinent History of Current Problem John Juárez is a 54 year old male admitted on 9/9/2020.  PMHx of schizophrenia, hx of TBI, alcohol use disorder, COPD, hyperlipidemia and hypothyroidism who was admitted on 9/9/2020 for evaluation of aggressive behavior at his group home.   Existing Precautions/Restrictions   (Legal hold, door alarm, agressive behaviors)   Left Upper Extremity (Weight-bearing Status) full weight-bearing (FWB)   Right Upper Extremity (Weight-bearing Status) full weight-bearing (FWB)   Left Lower Extremity (Weight-bearing Status) full weight-bearing (FWB)   Right Lower Extremity (Weight-bearing Status) full weight-bearing (FWB)   General Observations and Info Pt is up independently in room, no AD use.    Cognitive Status Examination   Orientation Status person;time   Affect/Mental Status (Cognitive)  flat/blunted affect   Executive Function Deficit impulse control;insight/awareness of deficits;judgment   Pain Assessment   Patient Currently in Pain No   Range of Motion Comprehensive   General Range of Motion bilateral upper extremity ROM WFL   Strength Comprehensive (MMT)   General Manual Muscle Testing (MMT) Assessment no strength deficits identified   Bed Mobility   Bed Mobility supine-sit;sit-supine   Supine-Sit [place] (Bed Mobility) independent   Sit-Supine [place] (Bed Mobility) independent   Clinical Impression   Criteria for Skilled Therapeutic Interventions Met (OT) no   OT Diagnosis Impaired I/ADL   OT Problem List-Impairments impacting ADL cognition   Assessment of Occupational Performance 1-3 Performance Deficits   Identified Performance Deficits cognition   Planned Therapy Interventions (OT) IADL retraining;ADL retraining;cognition   Clinical Decision Making Complexity (OT) low complexity   Therapy Frequency (OT) 1x eval and treat   Predicted Duration of Therapy 1   Risks and Benefits of Treatment have been explained. Yes   Patient, Family & other staff in agreement with plan of care Yes   OT Discharge Planning    OT Discharge Recommendation (DC Rec) Long term care facility   OT Rationale for DC Rec Pt appears at baseline for I/ANDRESSA's. Per SLUMS, pt scores a 23/30 indicating MNCD. Pt will benefit from continued assistance for IADL's (meal prep, finances, medication mangement, transportation) upon d/c for safety with ADL/IADL's.    OT Brief overview of current status  Pt up independently in room. per chart, hospital stay has been complicated by aggressive behaviors. Scores 23/30 on SLUMS this session

## 2020-12-16 LAB
ANION GAP SERPL CALCULATED.3IONS-SCNC: 3 MMOL/L (ref 3–14)
BUN SERPL-MCNC: 14 MG/DL (ref 7–30)
CALCIUM SERPL-MCNC: 8.6 MG/DL (ref 8.5–10.1)
CHLORIDE SERPL-SCNC: 110 MMOL/L (ref 94–109)
CO2 SERPL-SCNC: 29 MMOL/L (ref 20–32)
CREAT SERPL-MCNC: 0.68 MG/DL (ref 0.66–1.25)
ERYTHROCYTE [DISTWIDTH] IN BLOOD BY AUTOMATED COUNT: 14.3 % (ref 10–15)
GFR SERPL CREATININE-BSD FRML MDRD: >90 ML/MIN/{1.73_M2}
GLUCOSE SERPL-MCNC: 103 MG/DL (ref 70–99)
HCT VFR BLD AUTO: 40.3 % (ref 40–53)
HGB BLD-MCNC: 14.2 G/DL (ref 13.3–17.7)
MCH RBC QN AUTO: 32.6 PG (ref 26.5–33)
MCHC RBC AUTO-ENTMCNC: 35.2 G/DL (ref 31.5–36.5)
MCV RBC AUTO: 93 FL (ref 78–100)
PLATELET # BLD AUTO: 196 10E9/L (ref 150–450)
POTASSIUM SERPL-SCNC: 4 MMOL/L (ref 3.4–5.3)
RBC # BLD AUTO: 4.35 10E12/L (ref 4.4–5.9)
SODIUM SERPL-SCNC: 142 MMOL/L (ref 133–144)
WBC # BLD AUTO: 5.7 10E9/L (ref 4–11)

## 2020-12-16 PROCEDURE — 250N000013 HC RX MED GY IP 250 OP 250 PS 637: Performed by: INTERNAL MEDICINE

## 2020-12-16 PROCEDURE — 250N000013 HC RX MED GY IP 250 OP 250 PS 637: Performed by: PSYCHIATRY & NEUROLOGY

## 2020-12-16 PROCEDURE — 250N000013 HC RX MED GY IP 250 OP 250 PS 637: Performed by: HOSPITALIST

## 2020-12-16 PROCEDURE — 120N000001 HC R&B MED SURG/OB

## 2020-12-16 PROCEDURE — 36415 COLL VENOUS BLD VENIPUNCTURE: CPT | Performed by: HOSPITALIST

## 2020-12-16 PROCEDURE — 80048 BASIC METABOLIC PNL TOTAL CA: CPT | Performed by: HOSPITALIST

## 2020-12-16 PROCEDURE — 99231 SBSQ HOSP IP/OBS SF/LOW 25: CPT | Performed by: HOSPITALIST

## 2020-12-16 PROCEDURE — 85027 COMPLETE CBC AUTOMATED: CPT | Performed by: HOSPITALIST

## 2020-12-16 RX ADMIN — SALMETEROL XINAFOATE 1 PUFF: 50 POWDER, METERED ORAL; RESPIRATORY (INHALATION) at 09:41

## 2020-12-16 RX ADMIN — ATORVASTATIN CALCIUM 80 MG: 40 TABLET, FILM COATED ORAL at 21:29

## 2020-12-16 RX ADMIN — SALMETEROL XINAFOATE 1 PUFF: 50 POWDER, METERED ORAL; RESPIRATORY (INHALATION) at 21:29

## 2020-12-16 RX ADMIN — MEMANTINE 5 MG: 5 TABLET ORAL at 09:30

## 2020-12-16 RX ADMIN — HALOPERIDOL 10 MG: 5 TABLET ORAL at 21:29

## 2020-12-16 RX ADMIN — HALOPERIDOL 10 MG: 5 TABLET ORAL at 09:30

## 2020-12-16 RX ADMIN — Medication 0.25 MG: at 21:29

## 2020-12-16 RX ADMIN — HALOPERIDOL 10 MG: 5 TABLET ORAL at 15:28

## 2020-12-16 RX ADMIN — ASPIRIN 81 MG: 81 TABLET, COATED ORAL at 09:29

## 2020-12-16 RX ADMIN — Medication 0.25 MG: at 09:30

## 2020-12-16 RX ADMIN — DOCUSATE SODIUM 100 MG: 100 CAPSULE, LIQUID FILLED ORAL at 21:29

## 2020-12-16 RX ADMIN — LEVOTHYROXINE SODIUM 88 MCG: 88 TABLET ORAL at 09:29

## 2020-12-16 RX ADMIN — DOCUSATE SODIUM 100 MG: 100 CAPSULE, LIQUID FILLED ORAL at 09:30

## 2020-12-16 RX ADMIN — VENLAFAXINE HYDROCHLORIDE 75 MG: 75 CAPSULE, EXTENDED RELEASE ORAL at 09:30

## 2020-12-16 ASSESSMENT — ACTIVITIES OF DAILY LIVING (ADL)
ADLS_ACUITY_SCORE: 11

## 2020-12-16 NOTE — PLAN OF CARE
Alert to self & place - cooperative overnight, no PRN needed & slept most of night. Denies pain. Regular diet. Door alarm in place. IND in room. Waiting on guardianship & placement plan.

## 2020-12-16 NOTE — PLAN OF CARE
Shift note:VSS, no fever, fair day today, OT assessed for [facility] to request guardianship. Psych evaluated today, prn Ativan added, not used yet.

## 2020-12-16 NOTE — PROGRESS NOTES
[facility]    Medicine Progress Note - Hospitalist Service       Date of Admission:  9/9/2020  Assessment & Plan   John Juárez is a 54 year old male admitted on 9/9/2020.  PMHx of schizophrenia, hx of TBI, alcohol use disorder, COPD, hyperlipidemia and hypothyroidism who was admitted on 9/9/2020 for evaluation of aggressive behavior at his group home. His medical condition remains stable, though his group home is now unwilling to take him back. Hospitalization has been prolonged while arranging for new placement.      Neurocognitive disorder dt hx of TBI and alcohol use disorder  Schizophrenia  Had been residing in group home prior to admission.  Brought to hospital for evaluation of aggressive behaviors. Group home now unwilling to take him back. Has had numerous CODE 21s called this stay. Seen by psych, meds adjusted. Is currently under civil commitment and court hold through [facility] until 7/31/21.  Patient has been undergoing paper work for funding and placement.  - Psychiatry re-consulted 12/11, increased haloperidol to 10 mg TID. Continue.   - Continue PRN regimen including olanzapine ODT  - Code 21 if escalating behaviors, has been successfully de-escalated with Code 21 security team    Hyperlipidemia  Continue statin and aspirin.      COPD  Not O2 dependent.  - Continue salmeterol     Hypothyroidism  Continue levothyroxine     Tobacco Use  Using nicotine patch and PRN gum     Resting tremors of upper extremities  No acute issues      Asymptomatic COVID-19 testing negative on 12/6/2020 [done for discharge planning]     Diet: Room Service  Combination Diet Regular Diet Adult; Safe Tray - NO utensils  Diet    DVT Prophylaxis: Pneumatic Compression Devices  Valentin Catheter: not present  Code Status: Full Code      Disposition Plan   Expected discharge: Pending placement / commitment process  Entered: Bora Walls,  12/16/2020, 2:56 PM       The patient's care was  discussed with the Patient.    Bora Walls, DO  Hospitalist Service  [facility]    ______________________________________________________________________    Interval History   No acute events overnight. Denies any new symptoms.     Data reviewed today: I reviewed all medications, new labs and imaging results over the last 24 hours. I personally reviewed no images or EKG's today.    Physical Exam   Vital Signs: Temp: 95.6  F (35.3  C) Temp src: Axillary BP: 112/75 Pulse: 75   Resp: 16 SpO2: 97 % O2 Device: None (Room air)    Weight: 182 lbs 0 oz    Constitutional: Non-toxic appearing, NAD  Respiratory:    Cardiovascular:    GI:    Skin/Integumen:    Neuro/psych:  Alert.     Data   Recent Labs   Lab 12/16/20  0729   WBC 5.7   HGB 14.2   MCV 93         POTASSIUM 4.0   CHLORIDE 110*   CO2 29   BUN 14   CR 0.68   ANIONGAP 3   LESLEE 8.6   *       No results found for this or any previous visit (from the past 24 hour(s)).  Medications       aspirin  81 mg Oral Daily     atorvastatin  80 mg Oral At Bedtime     benztropine  0.25 mg Oral BID     docusate sodium  100 mg Oral BID     haloperidol  10 mg Oral TID     levothyroxine  88 mcg Oral Daily     memantine  5 mg Oral Daily     nicotine  1 patch Transdermal Daily     nicotine   Transdermal TID     polyethylene glycol  17 g Oral Daily     salmeterol  1 puff Inhalation BID     venlafaxine  75 mg Oral Daily with breakfast

## 2020-12-17 PROCEDURE — 120N000001 HC R&B MED SURG/OB

## 2020-12-17 PROCEDURE — 250N000013 HC RX MED GY IP 250 OP 250 PS 637: Performed by: INTERNAL MEDICINE

## 2020-12-17 PROCEDURE — 250N000013 HC RX MED GY IP 250 OP 250 PS 637: Performed by: PSYCHIATRY & NEUROLOGY

## 2020-12-17 PROCEDURE — 250N000013 HC RX MED GY IP 250 OP 250 PS 637: Performed by: HOSPITALIST

## 2020-12-17 PROCEDURE — 99231 SBSQ HOSP IP/OBS SF/LOW 25: CPT | Performed by: HOSPITALIST

## 2020-12-17 RX ADMIN — VENLAFAXINE HYDROCHLORIDE 75 MG: 75 CAPSULE, EXTENDED RELEASE ORAL at 08:50

## 2020-12-17 RX ADMIN — Medication 0.25 MG: at 21:46

## 2020-12-17 RX ADMIN — MEMANTINE 5 MG: 5 TABLET ORAL at 08:50

## 2020-12-17 RX ADMIN — Medication 0.25 MG: at 08:50

## 2020-12-17 RX ADMIN — HALOPERIDOL 10 MG: 5 TABLET ORAL at 15:03

## 2020-12-17 RX ADMIN — SALMETEROL XINAFOATE 1 PUFF: 50 POWDER, METERED ORAL; RESPIRATORY (INHALATION) at 21:47

## 2020-12-17 RX ADMIN — DOCUSATE SODIUM 100 MG: 100 CAPSULE, LIQUID FILLED ORAL at 21:46

## 2020-12-17 RX ADMIN — ATORVASTATIN CALCIUM 80 MG: 40 TABLET, FILM COATED ORAL at 21:46

## 2020-12-17 RX ADMIN — LEVOTHYROXINE SODIUM 88 MCG: 88 TABLET ORAL at 08:50

## 2020-12-17 RX ADMIN — DOCUSATE SODIUM 100 MG: 100 CAPSULE, LIQUID FILLED ORAL at 08:50

## 2020-12-17 RX ADMIN — HALOPERIDOL 10 MG: 5 TABLET ORAL at 21:46

## 2020-12-17 RX ADMIN — HALOPERIDOL 10 MG: 5 TABLET ORAL at 08:50

## 2020-12-17 RX ADMIN — ASPIRIN 81 MG: 81 TABLET, COATED ORAL at 08:50

## 2020-12-17 ASSESSMENT — ACTIVITIES OF DAILY LIVING (ADL)
ADLS_ACUITY_SCORE: 11

## 2020-12-17 NOTE — PLAN OF CARE
Alert, disoriented x3. Had 1 episode of yelling and throwing things in room, but became calm and cooperative without intervention. Slept most of night. Discharge pending need for guardianship, [facility] placement.

## 2020-12-17 NOTE — PROGRESS NOTES
[facility]    Medicine Progress Note - Hospitalist Service       Date of Admission:  9/9/2020  Assessment & Plan   John Juárez is a 54 year old male admitted on 9/9/2020.  PMHx of schizophrenia, hx of TBI, alcohol use disorder, COPD, hyperlipidemia and hypothyroidism who was admitted on 9/9/2020 for evaluation of aggressive behavior at his group home. His medical condition remains stable, though his group home is now unwilling to take him back. Hospitalization has been prolonged while arranging for new placement.      Neurocognitive disorder dt hx of TBI and alcohol use disorder  Schizophrenia  Had been residing in group home prior to admission.  Brought to hospital for evaluation of aggressive behaviors. Group home now unwilling to take him back. Has had numerous CODE 21s called this stay. Seen by psych, meds adjusted. Is currently under civil commitment and court hold through [facility] until 7/31/21.  Patient has been undergoing paper work for funding and placement.  - Psychiatry re-consulted 12/15, considering nivia-psych now  - patient has trialed depakote in the past (see [facility] hospitalization record in careeverywhere) but no evidence of clozapine from what I have been able to indentify. Consult psych to see if clozapine is an option  - Continue PRN regimen including olanzapine ODT  - Code 21 if escalating behaviors, has been successfully de-escalated with Code 21 security team    Hyperlipidemia  Continue statin and aspirin.      COPD  Not O2 dependent.  - Continue salmeterol     Hypothyroidism  Continue levothyroxine     Tobacco Use  Using nicotine patch and PRN gum     Resting tremors of upper extremities  No acute issues      Asymptomatic COVID-19 testing negative on 12/6/2020 [done for discharge planning]     Diet: Room Service  Combination Diet Regular Diet Adult; Safe Tray - NO utensils  Diet    DVT Prophylaxis: Pneumatic Compression Devices  Valentin Catheter: not  present  Code Status: Full Code      Disposition Plan   Expected discharge: Pending placement / commitment process  Entered: Bora Walls DO 12/17/2020, 1:06 PM       The patient's care was discussed with the Patient.    Bora Walls DO  Hospitalist Service  [facility]    ______________________________________________________________________    Interval History   No acute events overnight. Denies any new symptoms.     Data reviewed today: I reviewed all medications, new labs and imaging results over the last 24 hours. I personally reviewed no images or EKG's today.    Physical Exam   Vital Signs: Temp: 96.7  F (35.9  C) Temp src: Oral BP: 114/79 Pulse: 84   Resp: 16 SpO2: 97 % O2 Device: None (Room air)    Weight: 182 lbs 0 oz    Constitutional: Non-toxic appearing, NAD  Respiratory:    Cardiovascular:    GI:    Skin/Integumen:    Neuro/psych:  Alert.     Data   Recent Labs   Lab 12/16/20  0729   WBC 5.7   HGB 14.2   MCV 93         POTASSIUM 4.0   CHLORIDE 110*   CO2 29   BUN 14   CR 0.68   ANIONGAP 3   LESLEE 8.6   *       No results found for this or any previous visit (from the past 24 hour(s)).  Medications       aspirin  81 mg Oral Daily     atorvastatin  80 mg Oral At Bedtime     benztropine  0.25 mg Oral BID     docusate sodium  100 mg Oral BID     haloperidol  10 mg Oral TID     levothyroxine  88 mcg Oral Daily     memantine  5 mg Oral Daily     nicotine  1 patch Transdermal Daily     nicotine   Transdermal TID     polyethylene glycol  17 g Oral Daily     salmeterol  1 puff Inhalation BID     venlafaxine  75 mg Oral Daily with breakfast

## 2020-12-17 NOTE — PLAN OF CARE
Pt alert, confused. Labile mood with occasional vocal outbursts, but easily redirectable today. Noted periodic hallucinations today. Denies pain. Ind in room. Discharge pending placement.

## 2020-12-17 NOTE — PROGRESS NOTES
Care Management Follow Up    Length of Stay (days): 89    Expected Discharge Date: 12/23/20([facility] )     Concerns to be Addressed: discharge planning     Patient plan of care discussed at interdisciplinary rounds: Yes    Anticipated Discharge Disposition: Group Home     Anticipated Discharge Services:    Anticipated Discharge DME: None    Patient/family educated on Medicare website which has current facility and service quality ratings:    Education Provided on the Discharge Plan:    Patient/Family in Agreement with the Plan:      Referrals Placed by CM/SW: Post Acute Facilities  Private pay costs discussed: Not applicable    Additional Information:  At this time, the hospital will not need to pursue guardianship for patient.  Patient's finances can be managed thru a Representatiave Payee.  Dr Walls has completed the paperwork for Social  payee.  Patient's case work Kurtis Chapin thru [employer] ([phone]) will complete the steps to put the rep.payee in place.  Mr Chapin  has learned from Camacho Gonzalez, Assistant [facility] Attorney that because patient is already under a commitment to [facility] and Commissioner of Human Services once a facility has accepted patient the  can  file a motion for a temporary order granting a designee to sign paperwork to admit the client into a facility.  The facility accepting patient will need to know there is a designee who will sign patient into the facility, thus the temporary order will meet this need.    Mr Chapin will remain patient's  under the patient's MI Commitment.  The current commitment is in effective thru July 23,2021.  Patient is currently being assessed by [facility] which has a secured [facility] in [place] which specializes in patients who are under commitment and have mental health diagnosis and behavioral care needs.    Update at 1300  Daksha from [facility] received  the referral and responded she will contact writer on Monday to arrange a phone interview with patient.      ASIM CastilloSW

## 2020-12-18 PROCEDURE — 99231 SBSQ HOSP IP/OBS SF/LOW 25: CPT | Performed by: HOSPITALIST

## 2020-12-18 PROCEDURE — 120N000001 HC R&B MED SURG/OB

## 2020-12-18 PROCEDURE — 250N000013 HC RX MED GY IP 250 OP 250 PS 637: Performed by: INTERNAL MEDICINE

## 2020-12-18 PROCEDURE — 250N000013 HC RX MED GY IP 250 OP 250 PS 637: Performed by: HOSPITALIST

## 2020-12-18 PROCEDURE — 250N000013 HC RX MED GY IP 250 OP 250 PS 637: Performed by: PSYCHIATRY & NEUROLOGY

## 2020-12-18 PROCEDURE — 93010 ELECTROCARDIOGRAM REPORT: CPT | Performed by: INTERNAL MEDICINE

## 2020-12-18 PROCEDURE — 93005 ELECTROCARDIOGRAM TRACING: CPT

## 2020-12-18 RX ADMIN — LEVOTHYROXINE SODIUM 88 MCG: 88 TABLET ORAL at 08:51

## 2020-12-18 RX ADMIN — ASPIRIN 81 MG: 81 TABLET, COATED ORAL at 08:51

## 2020-12-18 RX ADMIN — HALOPERIDOL 10 MG: 5 TABLET ORAL at 08:51

## 2020-12-18 RX ADMIN — MEMANTINE 5 MG: 5 TABLET ORAL at 08:51

## 2020-12-18 RX ADMIN — OLANZAPINE 10 MG: 5 TABLET, ORALLY DISINTEGRATING ORAL at 13:17

## 2020-12-18 RX ADMIN — DOCUSATE SODIUM 100 MG: 100 CAPSULE, LIQUID FILLED ORAL at 08:51

## 2020-12-18 RX ADMIN — ATORVASTATIN CALCIUM 80 MG: 40 TABLET, FILM COATED ORAL at 20:55

## 2020-12-18 RX ADMIN — VENLAFAXINE HYDROCHLORIDE 75 MG: 75 CAPSULE, EXTENDED RELEASE ORAL at 08:51

## 2020-12-18 RX ADMIN — HALOPERIDOL 10 MG: 5 TABLET ORAL at 20:56

## 2020-12-18 RX ADMIN — Medication 0.25 MG: at 20:55

## 2020-12-18 RX ADMIN — Medication 0.25 MG: at 08:51

## 2020-12-18 RX ADMIN — HALOPERIDOL 10 MG: 5 TABLET ORAL at 15:30

## 2020-12-18 ASSESSMENT — ACTIVITIES OF DAILY LIVING (ADL)
ADLS_ACUITY_SCORE: 11

## 2020-12-18 NOTE — CONSULTS
[facility] Psychiatric Consult Progress Note    Interval History:   Pt seen, chart reviewed, case discussed with nursing staff. Nursing reports he is doing reasonably well the past few days. Appears not to be having breakthrough agitation. Adherent with mediations. No PRNs have been required. He reports he is sleeping well. Reports good appetite. Mood is fine. Denies feeling anxious. Denies SI/HI. Denies hallucinations or paranoia. He seems reasonably pleasant today. He is not irritable or angry at all. Never brings up anything about demanding to leave today. He was fine when I discussed discharge planning update without any concerns about this.      Review of systems:   10 point Review of Systems completed by Lio Murrieta MD and is negative other than noted in the HPI     Medications:       aspirin  81 mg Oral Daily     atorvastatin  80 mg Oral At Bedtime     benztropine  0.25 mg Oral BID     docusate sodium  100 mg Oral BID     haloperidol  10 mg Oral TID     levothyroxine  88 mcg Oral Daily     memantine  5 mg Oral Daily     nicotine  1 patch Transdermal Daily     nicotine   Transdermal TID     polyethylene glycol  17 g Oral Daily     salmeterol  1 puff Inhalation BID     venlafaxine  75 mg Oral Daily with breakfast     acetaminophen, albuterol, alum & mag hydroxide-simethicone, haloperidol, LORazepam, melatonin, naloxone **OR** naloxone **OR** naloxone **OR** naloxone, nicotine, OLANZapine, OLANZapine zydis, ondansetron **OR** ondansetron    Mental Status Examination:   Appearance:  awake, alert  Eye Contact:  good  Speech:  grumbles but coherent; calm, non-pressured  Language: normal  Psychomotor Behavior:  no evidence of tardive dyskinesia, dystonia, or tics  Mood:  Angry, frustrated  Affect: irritable  Thought Process:  tangential  Thought Content:  no evidence of suicidal ideation; no evidence of homicidal ideation; no observation of response to internal stimuli  Oriented to:   self  Attention Span and Concentration:  limited  Recent and Remote Memory:  poor  Insight:  Fair, improving  Judgment:  improving        Labs/Vitals:     No results found for this or any previous visit (from the past 24 hour(s)).  B/P: 106/74, T: 96.4, P: 79, R: 18        Diagnoses:   1.  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   2.  Other schizophrenia spectrum disorder.   3.  Alcohol use disorder, in remission.   4.  Stimulant use disorder, in remission.   5.  Chronic obstructive pulmonary disease.   6.  Hypertension.     12/18/20  He has had more stability in recent days. Not requring PRNs. Primary team/county working on placement options. He may have interview for group home on Monday I believe. Nursing have no concerns today.      Recommendations:   1. Continue medications without change.   2. Remains under commitment; awaiting placement  3. At this time I support county seeking placement outside the hospital as they are currently seeking. I do not see indication for kennedy-psych hospitalizations at this time given interval improvement from the weekend.   4. Reconsult psychiatry as needed  5. If patient would tolerate would be ideal to obtain EKG to update this prior to discharge given interval changes in complex antipsychotic regimen though last EKG in October QTc was well within normal range in the 430s     Attestation:  Patient has been seen and evaluated by me,  Lio Murrieta MD

## 2020-12-18 NOTE — PROGRESS NOTES
[facility]    Medicine Progress Note - Hospitalist Service       Date of Admission:  9/9/2020  Assessment & Plan   John Juárez is a 54 year old male admitted on 9/9/2020.  PMHx of schizophrenia, hx of TBI, alcohol use disorder, COPD, hyperlipidemia and hypothyroidism who was admitted on 9/9/2020 for evaluation of aggressive behavior at his group home. His medical condition remains stable, though his group home is now unwilling to take him back. Hospitalization has been prolonged while arranging for new placement.      Neurocognitive disorder dt hx of TBI and alcohol use disorder  Schizophrenia  Had been residing in group home prior to admission.  Brought to hospital for evaluation of aggressive behaviors. Group home now unwilling to take him back. Has had numerous CODE 21s called this stay. Seen by psych, meds adjusted. Is currently under civil commitment and court hold through [facility] until 7/31/21.  Patient has been undergoing paper work for funding and placement.  - Psychiatry re-consulted 12/15, considering nivia-psych now  - patient has trialed depakote in the past (see [facility] hospitalization record in careeverywhere) but no evidence of clozapine from what I have been able to indentify. Consult psych to see if clozapine is an option  - Continue PRN regimen including olanzapine ODT  - Code 21 if escalating behaviors, has been successfully de-escalated with Code 21 security team    Hyperlipidemia  Continue statin and aspirin.      COPD  Not O2 dependent.  - Continue salmeterol     Hypothyroidism  Continue levothyroxine     Tobacco Use  Using nicotine patch and PRN gum     Resting tremors of upper extremities  No acute issues      Asymptomatic COVID-19 testing negative on 12/6/2020 [done for discharge planning]     Diet: Room Service  Combination Diet Regular Diet Adult; Safe Tray - NO utensils  Diet    DVT Prophylaxis: Pneumatic Compression Devices  Valentin Catheter: not  present  Code Status: Full Code      Disposition Plan   Expected discharge: Pending placement / commitment process  Entered: Bora Walls DO 12/18/2020, 12:14 PM       The patient's care was discussed with the Patient.    Bora Walls DO  Hospitalist Service  [facility]    ______________________________________________________________________    Interval History   No acute events overnight. No current concerns    Data reviewed today: I reviewed all medications, new labs and imaging results over the last 24 hours. I personally reviewed no images or EKG's today.    Physical Exam   Vital Signs: Temp: 95.9  F (35.5  C) Temp src: Oral BP: 103/72 Pulse: 70   Resp: 16 SpO2: 95 % O2 Device: None (Room air)    Weight: 182 lbs 0 oz    Constitutional: Non-toxic appearing, NAD  Respiratory:    Cardiovascular:    GI:    Skin/Integumen:    Neuro/psych:  Alert.     Data   Recent Labs   Lab 12/16/20  0729   WBC 5.7   HGB 14.2   MCV 93         POTASSIUM 4.0   CHLORIDE 110*   CO2 29   BUN 14   CR 0.68   ANIONGAP 3   LESLEE 8.6   *       No results found for this or any previous visit (from the past 24 hour(s)).  Medications       aspirin  81 mg Oral Daily     atorvastatin  80 mg Oral At Bedtime     benztropine  0.25 mg Oral BID     docusate sodium  100 mg Oral BID     haloperidol  10 mg Oral TID     levothyroxine  88 mcg Oral Daily     memantine  5 mg Oral Daily     nicotine  1 patch Transdermal Daily     nicotine   Transdermal TID     polyethylene glycol  17 g Oral Daily     salmeterol  1 puff Inhalation BID     venlafaxine  75 mg Oral Daily with breakfast

## 2020-12-18 NOTE — PLAN OF CARE
Alert, disoriented x3. Cooperative and calm this shift, slept between cares. Discharge pending [facility] involvement, possible placement in [place]. See SW note.

## 2020-12-18 NOTE — PLAN OF CARE
Pt alert, disoriented x3. Has been calm/cooperative this shift, no PRNs given. Shower with scrubs and linens changed today. Discharge pending.

## 2020-12-19 PROCEDURE — 250N000013 HC RX MED GY IP 250 OP 250 PS 637: Performed by: INTERNAL MEDICINE

## 2020-12-19 PROCEDURE — 250N000013 HC RX MED GY IP 250 OP 250 PS 637: Performed by: NURSE PRACTITIONER

## 2020-12-19 PROCEDURE — 250N000013 HC RX MED GY IP 250 OP 250 PS 637: Performed by: HOSPITALIST

## 2020-12-19 PROCEDURE — 120N000001 HC R&B MED SURG/OB

## 2020-12-19 PROCEDURE — 250N000013 HC RX MED GY IP 250 OP 250 PS 637: Performed by: PSYCHIATRY & NEUROLOGY

## 2020-12-19 PROCEDURE — 99232 SBSQ HOSP IP/OBS MODERATE 35: CPT | Performed by: INTERNAL MEDICINE

## 2020-12-19 RX ADMIN — SALMETEROL XINAFOATE 1 PUFF: 50 POWDER, METERED ORAL; RESPIRATORY (INHALATION) at 22:09

## 2020-12-19 RX ADMIN — OLANZAPINE 10 MG: 5 TABLET, ORALLY DISINTEGRATING ORAL at 12:49

## 2020-12-19 RX ADMIN — ATORVASTATIN CALCIUM 80 MG: 40 TABLET, FILM COATED ORAL at 22:06

## 2020-12-19 RX ADMIN — ASPIRIN 81 MG: 81 TABLET, COATED ORAL at 10:08

## 2020-12-19 RX ADMIN — Medication 0.25 MG: at 22:07

## 2020-12-19 RX ADMIN — NICOTINE POLACRILEX 2 MG: 2 GUM, CHEWING ORAL at 15:43

## 2020-12-19 RX ADMIN — VENLAFAXINE HYDROCHLORIDE 75 MG: 75 CAPSULE, EXTENDED RELEASE ORAL at 10:08

## 2020-12-19 RX ADMIN — NICOTINE POLACRILEX 2 MG: 2 GUM, CHEWING ORAL at 12:50

## 2020-12-19 RX ADMIN — Medication 0.25 MG: at 10:08

## 2020-12-19 RX ADMIN — HALOPERIDOL 10 MG: 5 TABLET ORAL at 15:43

## 2020-12-19 RX ADMIN — MEMANTINE 5 MG: 5 TABLET ORAL at 10:09

## 2020-12-19 RX ADMIN — HALOPERIDOL 10 MG: 5 TABLET ORAL at 10:08

## 2020-12-19 RX ADMIN — HALOPERIDOL 10 MG: 5 TABLET ORAL at 22:07

## 2020-12-19 RX ADMIN — LEVOTHYROXINE SODIUM 88 MCG: 88 TABLET ORAL at 10:08

## 2020-12-19 ASSESSMENT — ACTIVITIES OF DAILY LIVING (ADL)
ADLS_ACUITY_SCORE: 11

## 2020-12-19 NOTE — PROGRESS NOTES
[facility]             Hospitalist Progress Note    Name: John Juárez    MRN: [id number]  YOB: 1965    Age: 55 year old  Date of admission: 9/9/2020  Primary care provider: Sushil Molina      Reason for Stay (Diagnosis): neurocognitive d/o/ h/o tbi/ schizophrenia/ dispo issues         Assessment and Plan:      Summary of Stay: John Juárez is a 54 year old male admitted on 9/9/2020.  PMHx of schizophrenia, hx of TBI, alcohol use disorder, COPD, hyperlipidemia and hypothyroidism who was admitted on 9/9/2020 for evaluation of aggressive behavior at his group home. His medical condition remains stable, though his group home is now unwilling to take him back. Hospitalization has been prolonged while arranging for new placement.      Neurocognitive disorder dt hx of TBI and alcohol use disorder  Schizophrenia  Had been residing in group home prior to admission. Brought to hospital for evaluation of aggressive behaviors. Group home now unwilling to take him back. Has had numerous CODE 21s called this stay. Seen by psych, meds adjusted. Is currently under civil commitment and court hold through [facility] until 7/31/21. Patient has been undergoing paper work for funding and placement. Cooperative with [facility] today  - Psychiatry re-consulted 12/15/20 and re-assessed on 12/18/20; pt compliant and not trying to leave but still a flight risk; rec continuing current psych meds and interview with [facility] for placement on Monday, No kennedy-psych recommended at this time.  - patient has trialed depakote in the past (see [facility] hospitalization record in careeverywhere) but no evidence of clozapine from what we have been able to indentify.   - Continue PRN regimen including olanzapine ODT  - Code 21 if escalating behaviors; has been successfully de-escalated with Code 21 security   - EKG 12/18/20 reviewed: nsr rightward axis with QTc 428ms; unchanged from 10/24/20 at which time  "QTc was 438ms.     Hyperlipidemia  Continue statin and aspirin.      COPD  Not O2 dependent.  - Continue salmeterol     Hypothyroidism  Continue levothyroxine     Tobacco Use  Using nicotine patch and PRN gum     Resting tremors of upper extremities  No acute issues      Asymptomatic COVID-19 testing negative on 12/6/2020 [done for discharge planning]     Diet: Room Service  Combination Diet Regular Diet Adult; Safe Tray - NO utensils  Diet    DVT Prophylaxis: Pneumatic Compression Devices  Valentin Catheter: not present  Code Status: Full Code             Disposition Plan   SWS input appreciated:  Daksha from [facility]) received the referral and responded she will contact writer on Monday to arrange a phone interview with patient.         Interval History (Subjective):      D/w nsg; no specific c/o. Was on the phone asking for fork and spoon to eat breakfast. Spirits \"good\". No objective hallucinations and currently cooperative with cares         Physical Exam:      Vital signs:  Temp: 96.8  F (36  C) Temp src: Oral BP: 108/73 Pulse: 79   Resp: 16 SpO2: 96 % O2 Device: None (Room air)   Height: 182.9 cm (6') Weight: 82.6 kg (182 lb)  Estimated body mass index is 24.68 kg/m  as calculated from the following:    Height as of this encounter: 1.829 m (6').    Weight as of this encounter: 82.6 kg (182 lb).    I/O last 3 completed shifts:  In: 960 [P.O.:960]  Out: -   Vitals:    09/09/20 1754 09/25/20 0632 12/11/20 0700   Weight: 70 kg (154 lb 5.2 oz) 71 kg (156 lb 8 oz) 82.6 kg (182 lb)       Constitutional: Awake, alert, cooperative, no apparent distress     Respiratory: Nl work of breathing. Clear to auscultation bilaterally, no crackles or wheezing   Cardiovascular: Regular rate and rhythm, normal S1 and S2, and no murmur noted       Skin: No rashes, no cyanosis, dry to touch   Neuro: CN 2-12 intact, no localizing weakness   Extremities: No edema, normal range of motion   HEENT Normocephalic, atraumatic, " normal nasal turbinates; oropharynx clear   Neck Supple; nl inspection; trachea midline; no thryomegaly   Psychiatric: Alert. Normal affect          Medications:      All current medications were reviewed with changes reflected in problem list.         Data:      All new lab and imaging data was reviewed.   Labs:  No results for input(s): CULT in the last 168 hours.  Recent Labs   Lab 12/16/20  0729   WBC 5.7   HGB 14.2   HCT 40.3   MCV 93        Recent Labs   Lab 12/16/20  0729      POTASSIUM 4.0   CHLORIDE 110*   CO2 29   ANIONGAP 3   *   BUN 14   CR 0.68   GFRESTIMATED >90   GFRESTBLACK >90   LESLEE 8.6     No results for input(s): INR in the last 168 hours.   Imaging:   No results found for this or any previous visit (from the past 24 hour(s)).    Marlyn Singletary -[phone]

## 2020-12-19 NOTE — PLAN OF CARE
[id number]: Patient agitated at beginning of shift - wanting to go smoke a cigarette. Given schedule haldol and PRN nicorette gum. Calmed down and cooperative the rest of the evening. Good intake for dinner. Meeting on Monday with [facility] for placement.

## 2020-12-19 NOTE — PLAN OF CARE
Calm and cooperative most of the day. Patient came out of room a couple of times stating he wanted his clothes so he could leave. PRN zyprexa given x1 which seemed effective. No further behaviors or attempts to leave. Easily redirected with food and drink. Discharge pending placement. [place] facility to contact  on Monday to set up phone interview with patient.

## 2020-12-19 NOTE — PLAN OF CARE
Calm and cooperative this morning, became more irritable at times this afternoon, PRN zyprexa effective.  Pt reports wanting to go outside for a cigarette, PRN nicorette gum given.  Redirectable with ginger ale.  Bowel meds held due to reports of loose stools on nocs.  Disposition pending meeting with [facility] on Monday.

## 2020-12-19 NOTE — PLAN OF CARE
Alert to self and place. Agitated last night, improved overnight. Came out of room multiple times in beginning of shift. Easily redirected w/ snacks and pop. Continent of B/B-had multiple BMs last night. Has a phone meeting with a facility in [place] on Monday. Discharge pending placement.

## 2020-12-20 PROCEDURE — 120N000001 HC R&B MED SURG/OB

## 2020-12-20 PROCEDURE — 250N000013 HC RX MED GY IP 250 OP 250 PS 637: Performed by: PSYCHIATRY & NEUROLOGY

## 2020-12-20 PROCEDURE — 250N000013 HC RX MED GY IP 250 OP 250 PS 637: Performed by: INTERNAL MEDICINE

## 2020-12-20 PROCEDURE — 99207 PR CDG-EXAM COMPONENT: MEETS EXP PROBLEM FOCUSED - DOWN CODED: CPT | Performed by: INTERNAL MEDICINE

## 2020-12-20 PROCEDURE — 99231 SBSQ HOSP IP/OBS SF/LOW 25: CPT | Performed by: INTERNAL MEDICINE

## 2020-12-20 PROCEDURE — 250N000013 HC RX MED GY IP 250 OP 250 PS 637: Performed by: HOSPITALIST

## 2020-12-20 RX ADMIN — HALOPERIDOL 10 MG: 5 TABLET ORAL at 09:00

## 2020-12-20 RX ADMIN — Medication 0.25 MG: at 09:00

## 2020-12-20 RX ADMIN — OLANZAPINE 10 MG: 5 TABLET, ORALLY DISINTEGRATING ORAL at 11:09

## 2020-12-20 RX ADMIN — HALOPERIDOL 10 MG: 5 TABLET ORAL at 15:24

## 2020-12-20 RX ADMIN — Medication 0.25 MG: at 20:38

## 2020-12-20 RX ADMIN — LEVOTHYROXINE SODIUM 88 MCG: 88 TABLET ORAL at 09:00

## 2020-12-20 RX ADMIN — ASPIRIN 81 MG: 81 TABLET, COATED ORAL at 09:00

## 2020-12-20 RX ADMIN — VENLAFAXINE HYDROCHLORIDE 75 MG: 75 CAPSULE, EXTENDED RELEASE ORAL at 09:00

## 2020-12-20 RX ADMIN — MEMANTINE 5 MG: 5 TABLET ORAL at 09:00

## 2020-12-20 RX ADMIN — HALOPERIDOL 10 MG: 5 TABLET ORAL at 20:38

## 2020-12-20 RX ADMIN — ATORVASTATIN CALCIUM 80 MG: 40 TABLET, FILM COATED ORAL at 20:38

## 2020-12-20 ASSESSMENT — ACTIVITIES OF DAILY LIVING (ADL)
ADLS_ACUITY_SCORE: 11

## 2020-12-20 NOTE — PLAN OF CARE
Shift note:   A&Ox2, independent in room, calm, given scheduled haldol before bed, [facility]

## 2020-12-20 NOTE — PLAN OF CARE
A&O x2, irritable at times  Attempted to leave down stairwell, easily redirected.  PRN Zyprexa then given which was effective. Plan for meeting tomorrow with [facility] for placement.

## 2020-12-20 NOTE — PROGRESS NOTES
[facility]    Medicine Progress Note - Hospitalist Service       Date of Admission:  9/9/2020  Assessment & Plan       John Juárez is a 54 year old male admitted on 9/9/2020.  PMHx of schizophrenia, hx of TBI, alcohol use disorder, COPD, hyperlipidemia and hypothyroidism who was admitted on 9/9/2020 for evaluation of aggressive behavior at his group home. His medical condition remains stable, though his group home is now unwilling to take him back. Hospitalization has been prolonged while arranging for new placement.      Neurocognitive disorder dt hx of TBI and alcohol use disorder  Schizophrenia  Had been residing in group home prior to admission.  Brought to hospital for evaluation of aggressive behaviors. Group home now unwilling to take him back. Has had numerous CODE 21s called this stay. Seen by psych, meds adjusted. Is currently under civil commitment and court hold through [facility] until 7/31/21.  Patient has been undergoing paper work for funding and placement.  - Psychiatry re-consulted 12/15, considering nivia-psych now  - patient has trialed depakote in the past (see [facility] hospitalization record in careeverywhere) but no evidence of clozapine from what I have been able to indentify. Consult psych to see if clozapine is an option  - Continue PRN regimen including olanzapine ODT  - Code 21 if escalating behaviors, has been successfully de-escalated with Code 21 security team    Hyperlipidemia  Continue statin and aspirin.      COPD  Not O2 dependent.  - Continue salmeterol     Hypothyroidism  Continue levothyroxine     Tobacco Use  Using nicotine patch and PRN gum     Resting tremors of upper extremities  No acute issues      Asymptomatic COVID-19 testing negative on 12/6/2020 [done for discharge planning]     Diet: Room Service  Combination Diet Regular Diet Adult; Safe Tray - NO utensils  Diet    DVT Prophylaxis: Pneumatic Compression Devices  Valentin Catheter: not  present  Code Status: Full Code           Diet: Room Service  Combination Diet Regular Diet Adult; Safe Tray - NO utensils  Diet    DVT Prophylaxis: Pneumatic Compression Devices  Valentin Catheter: not present  Code Status: Full Code           Disposition Plan   Expected discharge: Tomorrow, recommended to secured long term once safe disposition plan.  Entered: Virgie Cohen MD 12/20/2020, 10:36 AM       The patient's care was discussed with the Patient.    Virgie Cohen MD  Hospitalist Service  [facility]  Contact information available via [facility] Paging/Directory    ______________________________________________________________________    Interval History     Tired. Not wanting to talk. Listening to music. Does not want me to examine him.    Data reviewed today: I reviewed all medications, new labs and imaging results over the last 24 hours. I personally reviewed no images or EKG's today.    Physical Exam   Vital Signs: Temp: 97.3  F (36.3  C) Temp src: Oral BP: 95/58 Pulse: 74   Resp: 16 SpO2: 98 % O2 Device: None (Room air)    Weight: 182 lbs 0 oz    Gen - alert, awake in NAD.    Data   Recent Labs   Lab 12/16/20  0729   WBC 5.7   HGB 14.2   MCV 93         POTASSIUM 4.0   CHLORIDE 110*   CO2 29   BUN 14   CR 0.68   ANIONGAP 3   LESLEE 8.6   *     No results found for this or any previous visit (from the past 24 hour(s)).  Medications       aspirin  81 mg Oral Daily     atorvastatin  80 mg Oral At Bedtime     benztropine  0.25 mg Oral BID     docusate sodium  100 mg Oral BID     haloperidol  10 mg Oral TID     levothyroxine  88 mcg Oral Daily     memantine  5 mg Oral Daily     nicotine  1 patch Transdermal Daily     nicotine   Transdermal TID     polyethylene glycol  17 g Oral Daily     salmeterol  1 puff Inhalation BID     venlafaxine  75 mg Oral Daily with breakfast

## 2020-12-21 PROCEDURE — 120N000001 HC R&B MED SURG/OB

## 2020-12-21 PROCEDURE — 250N000013 HC RX MED GY IP 250 OP 250 PS 637: Performed by: NURSE PRACTITIONER

## 2020-12-21 PROCEDURE — 250N000013 HC RX MED GY IP 250 OP 250 PS 637: Performed by: HOSPITALIST

## 2020-12-21 PROCEDURE — 250N000013 HC RX MED GY IP 250 OP 250 PS 637: Performed by: PSYCHIATRY & NEUROLOGY

## 2020-12-21 PROCEDURE — 99232 SBSQ HOSP IP/OBS MODERATE 35: CPT | Performed by: INTERNAL MEDICINE

## 2020-12-21 PROCEDURE — 250N000013 HC RX MED GY IP 250 OP 250 PS 637: Performed by: INTERNAL MEDICINE

## 2020-12-21 RX ADMIN — VENLAFAXINE HYDROCHLORIDE 75 MG: 75 CAPSULE, EXTENDED RELEASE ORAL at 09:09

## 2020-12-21 RX ADMIN — Medication 0.25 MG: at 09:09

## 2020-12-21 RX ADMIN — HALOPERIDOL 10 MG: 5 TABLET ORAL at 21:50

## 2020-12-21 RX ADMIN — ATORVASTATIN CALCIUM 80 MG: 40 TABLET, FILM COATED ORAL at 20:38

## 2020-12-21 RX ADMIN — MEMANTINE 5 MG: 5 TABLET ORAL at 09:09

## 2020-12-21 RX ADMIN — NICOTINE POLACRILEX 2 MG: 2 GUM, CHEWING ORAL at 12:39

## 2020-12-21 RX ADMIN — ASPIRIN 81 MG: 81 TABLET, COATED ORAL at 09:09

## 2020-12-21 RX ADMIN — DOCUSATE SODIUM 100 MG: 100 CAPSULE, LIQUID FILLED ORAL at 20:38

## 2020-12-21 RX ADMIN — OLANZAPINE 10 MG: 5 TABLET, ORALLY DISINTEGRATING ORAL at 18:37

## 2020-12-21 RX ADMIN — LEVOTHYROXINE SODIUM 88 MCG: 88 TABLET ORAL at 09:09

## 2020-12-21 RX ADMIN — DOCUSATE SODIUM 100 MG: 100 CAPSULE, LIQUID FILLED ORAL at 09:09

## 2020-12-21 RX ADMIN — Medication 0.25 MG: at 20:38

## 2020-12-21 RX ADMIN — HALOPERIDOL 10 MG: 5 TABLET ORAL at 09:09

## 2020-12-21 RX ADMIN — OLANZAPINE 10 MG: 5 TABLET, ORALLY DISINTEGRATING ORAL at 12:39

## 2020-12-21 RX ADMIN — HALOPERIDOL 10 MG: 5 TABLET ORAL at 16:14

## 2020-12-21 ASSESSMENT — ACTIVITIES OF DAILY LIVING (ADL)
ADLS_ACUITY_SCORE: 11

## 2020-12-21 NOTE — PROGRESS NOTES
[facility]    Medicine Progress Note - Hospitalist Service       Date of Admission:  9/9/2020  Assessment & Plan       John Juárez is a 54 year old male admitted on 9/9/2020.  PMHx of schizophrenia, hx of TBI, alcohol use disorder, COPD, hyperlipidemia and hypothyroidism who was admitted on 9/9/2020 for evaluation of aggressive behavior at his group home. His medical condition remains stable, though his group home is now unwilling to take him back. Hospitalization has been prolonged while arranging for new placement.      Neurocognitive disorder dt hx of TBI and alcohol use disorder  Schizophrenia  Had been residing in group home prior to admission.  Brought to hospital for evaluation of aggressive behaviors. Group home now unwilling to take him back. Has had numerous CODE 21s called this stay. Seen by psych, meds adjusted. Is currently under civil commitment and court hold through [facility] until 7/31/21.  Patient has been undergoing paper work for funding and placement.  - Psychiatry re-consulted 12/15, considering nivia-psych now  - patient has trialed depakote in the past (see [facility] [place] in careeverywhere) but no evidence of clozapine from what I have been able to indentify. Consult psych to see if clozapine is an option  - Continue PRN regimen including olanzapine ODT  Pleasant today     Hyperlipidemia  Continue statin and aspirin.      COPD  Not O2 dependent.  - Continue salmeterol     Hypothyroidism  Continue levothyroxine     Tobacco Use  Using nicotine patch and PRN gum     Resting tremors of upper extremities  No acute issues      Asymptomatic COVID-19 testing negative on 12/6/2020 [done for discharge planning]     Diet: Room Service  Combination Diet Regular Diet Adult; Safe Tray - NO utensils  Diet    DVT Prophylaxis: Pneumatic Compression Devices  Valentin Catheter: not present  Code Status: Full Code           Diet: Room Service  Combination Diet Regular  Diet Adult; Safe Tray - NO utensils  Diet    DVT Prophylaxis: Pneumatic Compression Devices  Valentin Catheter: not present  Code Status: Full Code           Disposition Plan   Expected discharge: Tomorrow, recommended to secured [facility] once safe disposition plan.  Entered: Felisa Fitch MD 12/21/2020, 1:06 PM       The patient's care was discussed with the Patient.    Felisa Fitch MD  Hospitalist Service  [facility]______________________________________________________________________    Interval History     Wants to get out of here .No acute issues     Data reviewed today: I reviewed all medications, new labs and imaging results over the last 24 hours. I personally reviewed no images or EKG's today.    Physical Exam   Vital Signs: Temp: 97.4  F (36.3  C) Temp src: Oral BP: 112/77 Pulse: 92   Resp: 16 SpO2: 99 % O2 Device: None (Room air)    Weight: 182 lbs 0 oz    Gen - alert, awake in NAD.    Data   Recent Labs   Lab 12/16/20  0729   WBC 5.7   HGB 14.2   MCV 93         POTASSIUM 4.0   CHLORIDE 110*   CO2 29   BUN 14   CR 0.68   ANIONGAP 3   LESLEE 8.6   *     No results found for this or any previous visit (from the past 24 hour(s)).  Medications       aspirin  81 mg Oral Daily     atorvastatin  80 mg Oral At Bedtime     benztropine  0.25 mg Oral BID     docusate sodium  100 mg Oral BID     haloperidol  10 mg Oral TID     levothyroxine  88 mcg Oral Daily     memantine  5 mg Oral Daily     nicotine  1 patch Transdermal Daily     nicotine   Transdermal TID     polyethylene glycol  17 g Oral Daily     salmeterol  1 puff Inhalation BID     venlafaxine  75 mg Oral Daily with breakfast

## 2020-12-21 NOTE — PLAN OF CARE
Alert to self only,Up independent in room.Was  very anxious this afternoon wanting to go out for smoking,PRN Zyprexa and Nicotine gum  given with much relief.Tolerating regular diet.Discharge pending.

## 2020-12-21 NOTE — PLAN OF CARE
Pt alert to self and place, very calm and cooperative this shift. Slept well overnight. Up ind. Plan for phone meeting with facility today for possible placement.

## 2020-12-22 LAB — INTERPRETATION ECG - MUSE: NORMAL

## 2020-12-22 PROCEDURE — 99232 SBSQ HOSP IP/OBS MODERATE 35: CPT | Performed by: INTERNAL MEDICINE

## 2020-12-22 PROCEDURE — 250N000013 HC RX MED GY IP 250 OP 250 PS 637: Performed by: HOSPITALIST

## 2020-12-22 PROCEDURE — 250N000013 HC RX MED GY IP 250 OP 250 PS 637: Performed by: NURSE PRACTITIONER

## 2020-12-22 PROCEDURE — 120N000001 HC R&B MED SURG/OB

## 2020-12-22 PROCEDURE — 250N000013 HC RX MED GY IP 250 OP 250 PS 637: Performed by: PSYCHIATRY & NEUROLOGY

## 2020-12-22 PROCEDURE — 250N000013 HC RX MED GY IP 250 OP 250 PS 637: Performed by: INTERNAL MEDICINE

## 2020-12-22 RX ADMIN — HALOPERIDOL 10 MG: 5 TABLET ORAL at 21:12

## 2020-12-22 RX ADMIN — MEMANTINE 5 MG: 5 TABLET ORAL at 09:50

## 2020-12-22 RX ADMIN — DOCUSATE SODIUM 100 MG: 100 CAPSULE, LIQUID FILLED ORAL at 09:49

## 2020-12-22 RX ADMIN — DOCUSATE SODIUM 100 MG: 100 CAPSULE, LIQUID FILLED ORAL at 21:12

## 2020-12-22 RX ADMIN — HALOPERIDOL 10 MG: 5 TABLET ORAL at 09:49

## 2020-12-22 RX ADMIN — LEVOTHYROXINE SODIUM 88 MCG: 88 TABLET ORAL at 09:50

## 2020-12-22 RX ADMIN — ATORVASTATIN CALCIUM 80 MG: 40 TABLET, FILM COATED ORAL at 21:12

## 2020-12-22 RX ADMIN — Medication 0.25 MG: at 21:12

## 2020-12-22 RX ADMIN — SALMETEROL XINAFOATE 1 PUFF: 50 POWDER, METERED ORAL; RESPIRATORY (INHALATION) at 10:26

## 2020-12-22 RX ADMIN — Medication 0.25 MG: at 09:49

## 2020-12-22 RX ADMIN — ASPIRIN 81 MG: 81 TABLET, COATED ORAL at 09:50

## 2020-12-22 RX ADMIN — VENLAFAXINE HYDROCHLORIDE 75 MG: 75 CAPSULE, EXTENDED RELEASE ORAL at 09:50

## 2020-12-22 RX ADMIN — OLANZAPINE 10 MG: 5 TABLET, ORALLY DISINTEGRATING ORAL at 17:30

## 2020-12-22 RX ADMIN — HALOPERIDOL 10 MG: 5 TABLET ORAL at 15:56

## 2020-12-22 RX ADMIN — NICOTINE 1 PATCH: 21 PATCH, EXTENDED RELEASE TRANSDERMAL at 09:54

## 2020-12-22 ASSESSMENT — ACTIVITIES OF DAILY LIVING (ADL)
ADLS_ACUITY_SCORE: 11

## 2020-12-22 NOTE — PLAN OF CARE
Alert, oriented to self only. Restless & agitated, PRN Zyprexa given & effective. Up ind in room, ambulated halls. Regular diet, good appetite. Discharge pending placement.

## 2020-12-22 NOTE — PROGRESS NOTES
Care Management Follow Up    Length of Stay (days): 94    Expected Discharge Date: 12/23/20(Placement at [facility])     Concerns to be Addressed: discharge planning     Patient plan of care discussed at interdisciplinary rounds: Yes    Anticipated Discharge Disposition: Group Home     Anticipated Discharge Services:    Anticipated Discharge DME: None    Patient/family educated on Medicare website which has current facility and service quality ratings:    Education Provided on the Discharge Plan:    Patient/Family in Agreement with the Plan:      Referrals Placed by CM/SW: Post Acute Facilities  Private pay costs discussed: Costs will not be discussed with patient due to his cognitive impairment.  His [place]  [place] will be granted Representative Payee to dispense patient's monthly social security income for lodging expenses  as required under [place] guidelines    Additional Information:  Today at 1300, writer will be completing a phone assessment  with Yaa nurse at [facility].  Following our conversation, writer will ask the bedside side to speak with Yaa if needed and then Yaa will speak with patient via phone.   Yaa is aware patient may be hard to understand over the phone as he often has a soft voice and she also knows patient is delusional.    Update at 1545  Yaa from [facility] did call and speak with writer and bedside nurse to complete their assessment.  Patient refused to speak with Yaa, stating he doesn't need to go to an assisted living.  He has mansions he can go and live at.  He then asked when he would discharge and writer deferred to MD which patient accepted.  Writer should hear back in the next day or two if [facility] can offer patient placement.    Received e-mail from Kurtis Hoffman, patient's CM.  He shares that [place] thru [employer] [place] will be patient's Representative Payee.   Lulu Contreras,  LICSW

## 2020-12-22 NOTE — PLAN OF CARE
Alert to self. Up independently in room. Calm and slept well during the shift. Discharge pending placement. Continue to monitor.

## 2020-12-22 NOTE — PROGRESS NOTES
[facility]    Medicine Progress Note - Hospitalist Service       Date of Admission:  9/9/2020  Assessment & Plan       John Juárez is a 54 year old male admitted on 9/9/2020.  PMHx of schizophrenia, hx of TBI, alcohol use disorder, COPD, hyperlipidemia and hypothyroidism who was admitted on 9/9/2020 for evaluation of aggressive behavior at his group home. His medical condition remains stable, though his group home is now unwilling to take him back. Hospitalization has been prolonged while arranging for new placement.      Neurocognitive disorder dt hx of TBI and alcohol use disorder  Schizophrenia  Had been residing in group home prior to admission.  Brought to hospital for evaluation of aggressive behaviors. Group home now unwilling to take him back. Has had numerous CODE 21s called this stay. Seen by psych, meds adjusted. Is currently under civil commitment and court hold through [facility] until 7/31/21.  Patient has been undergoing paper work for funding and placement.  - Psychiatry re-consulted 12/15, considering nivia-psych now  - patient has trialed depakote in the past (see [facility] hospitalization record in careeverywhere) but no evidence of clozapine from what I have been able to indentify. Consult psych to see if clozapine is an option  - Continue PRN regimen including olanzapine ODT  Pleasant and cooperative  today     Hyperlipidemia  Continue statin and aspirin.      COPD  Not O2 dependent.  - Continue salmeterol     Hypothyroidism  Continue levothyroxine     Tobacco Use  Using nicotine patch and PRN gum     Resting tremors of upper extremities  No acute issues      Asymptomatic COVID-19 testing negative on 12/6/2020 [done for discharge planning]     Diet: Room Service  Combination Diet Regular Diet Adult; Safe Tray - NO utensils  Diet    DVT Prophylaxis: Pneumatic Compression Devices  Valentin Catheter: not present  Code Status: Full Code           Diet: Room  Service  Combination Diet Regular Diet Adult; Safe Tray - NO utensils  Diet    DVT Prophylaxis: Pneumatic Compression Devices  Valentin Catheter: not present  Code Status: Full Code           Disposition Plan   Expected discharge: Tomorrow, recommended to secured residential once safe disposition plan.  Entered: Felisa Fitch MD 12/22/2020, 12:58 PM       The patient's care was discussed with the Patient.    Felisa Fitch MD  Hospitalist Service  [facility]______________________________________________________________________    Interval History     Resting comfortably in bed  .No acute issues     Data reviewed today: I reviewed all medications, new labs and imaging results over the last 24 hours. I personally reviewed no images or EKG's today.    Physical Exam   Vital Signs: Temp: 97.5  F (36.4  C) Temp src: Oral BP: 99/67 Pulse: 76   Resp: 16 SpO2: 98 % O2 Device: None (Room air)    Weight: 182 lbs 0 oz    Gen - alert, awake in NAD.  Lungs: CTA  Heart: RRR    Data   Recent Labs   Lab 12/16/20  0729   WBC 5.7   HGB 14.2   MCV 93         POTASSIUM 4.0   CHLORIDE 110*   CO2 29   BUN 14   CR 0.68   ANIONGAP 3   LESLEE 8.6   *     No results found for this or any previous visit (from the past 24 hour(s)).  Medications       aspirin  81 mg Oral Daily     atorvastatin  80 mg Oral At Bedtime     benztropine  0.25 mg Oral BID     docusate sodium  100 mg Oral BID     haloperidol  10 mg Oral TID     levothyroxine  88 mcg Oral Daily     memantine  5 mg Oral Daily     nicotine  1 patch Transdermal Daily     nicotine   Transdermal TID     polyethylene glycol  17 g Oral Daily     salmeterol  1 puff Inhalation BID     venlafaxine  75 mg Oral Daily with breakfast

## 2020-12-23 PROCEDURE — 250N000013 HC RX MED GY IP 250 OP 250 PS 637: Performed by: INTERNAL MEDICINE

## 2020-12-23 PROCEDURE — 250N000013 HC RX MED GY IP 250 OP 250 PS 637: Performed by: NURSE PRACTITIONER

## 2020-12-23 PROCEDURE — 83605 ASSAY OF LACTIC ACID: CPT | Performed by: INTERNAL MEDICINE

## 2020-12-23 PROCEDURE — 250N000013 HC RX MED GY IP 250 OP 250 PS 637: Performed by: HOSPITALIST

## 2020-12-23 PROCEDURE — 99207 PR NO BILLABLE SERVICE THIS VISIT: CPT | Performed by: NURSE PRACTITIONER

## 2020-12-23 PROCEDURE — 99231 SBSQ HOSP IP/OBS SF/LOW 25: CPT | Performed by: INTERNAL MEDICINE

## 2020-12-23 PROCEDURE — 120N000001 HC R&B MED SURG/OB

## 2020-12-23 PROCEDURE — 250N000013 HC RX MED GY IP 250 OP 250 PS 637: Performed by: PSYCHIATRY & NEUROLOGY

## 2020-12-23 RX ORDER — OLANZAPINE 5 MG/1
5-10 TABLET, ORALLY DISINTEGRATING ORAL EVERY 12 HOURS PRN
Status: DISCONTINUED | OUTPATIENT
Start: 2020-12-23 | End: 2021-05-09

## 2020-12-23 RX ORDER — OLANZAPINE 5 MG/1
10 TABLET, ORALLY DISINTEGRATING ORAL ONCE
Status: COMPLETED | OUTPATIENT
Start: 2020-12-23 | End: 2020-12-23

## 2020-12-23 RX ADMIN — ASPIRIN 81 MG: 81 TABLET, COATED ORAL at 08:54

## 2020-12-23 RX ADMIN — HALOPERIDOL 10 MG: 5 TABLET ORAL at 21:00

## 2020-12-23 RX ADMIN — NICOTINE POLACRILEX 2 MG: 2 GUM, CHEWING ORAL at 11:00

## 2020-12-23 RX ADMIN — ATORVASTATIN CALCIUM 80 MG: 40 TABLET, FILM COATED ORAL at 21:00

## 2020-12-23 RX ADMIN — DOCUSATE SODIUM 100 MG: 100 CAPSULE, LIQUID FILLED ORAL at 21:00

## 2020-12-23 RX ADMIN — HALOPERIDOL 10 MG: 5 TABLET ORAL at 15:38

## 2020-12-23 RX ADMIN — OLANZAPINE 10 MG: 5 TABLET, ORALLY DISINTEGRATING ORAL at 12:34

## 2020-12-23 RX ADMIN — VENLAFAXINE HYDROCHLORIDE 75 MG: 75 CAPSULE, EXTENDED RELEASE ORAL at 08:54

## 2020-12-23 RX ADMIN — HALOPERIDOL 5 MG: 5 TABLET ORAL at 11:42

## 2020-12-23 RX ADMIN — Medication 0.25 MG: at 21:00

## 2020-12-23 RX ADMIN — Medication 0.25 MG: at 08:54

## 2020-12-23 RX ADMIN — HALOPERIDOL 10 MG: 5 TABLET ORAL at 08:54

## 2020-12-23 RX ADMIN — LEVOTHYROXINE SODIUM 88 MCG: 88 TABLET ORAL at 08:54

## 2020-12-23 RX ADMIN — OLANZAPINE 10 MG: 5 TABLET, ORALLY DISINTEGRATING ORAL at 10:57

## 2020-12-23 RX ADMIN — DOCUSATE SODIUM 100 MG: 100 CAPSULE, LIQUID FILLED ORAL at 08:54

## 2020-12-23 RX ADMIN — MEMANTINE 5 MG: 5 TABLET ORAL at 08:54

## 2020-12-23 ASSESSMENT — ACTIVITIES OF DAILY LIVING (ADL)
ADLS_ACUITY_SCORE: 11

## 2020-12-23 NOTE — PLAN OF CARE
[id number]: pt with continuous requests for clothing and discharge. Many attempts at redirection and reassurance. PRN Zyprexa and haldol ineffective. Screaming. Doors slamming. Found in elevator bay. Code 21 called.    Assumed care of pt @ 1230. Pt agitated, frequently coming into cruz and stating that he needs his clothes and that  Rosanna says his hold is up. Code 21 called,  explained to pt that he is still on hold. 1x 10 mg PO zyprexa dose given, effective.

## 2020-12-23 NOTE — PLAN OF CARE
[id number]  Assumed care of pt @ 1230. Pt agitated, frequently coming into cruz and stating that he needs his clothes and that  [facility] says his hold is up. Code 21 called,  explained to pt that he is still on hold. 1x 10 mg PO zyprexa dose given, effective. Court hold continues through 7/2021, placement pending.

## 2020-12-23 NOTE — PROGRESS NOTES
[facility]    Medicine Progress Note - Hospitalist Service       Date of Admission:  9/9/2020  Assessment & Plan       John Juárez is a 54 year old male admitted on 9/9/2020.  PMHx of schizophrenia, hx of TBI, alcohol use disorder, COPD, hyperlipidemia and hypothyroidism who was admitted on 9/9/2020 for evaluation of aggressive behavior at his group home. His medical condition remains stable, though his group home is now unwilling to take him back. Hospitalization has been prolonged while arranging for new placement.      Neurocognitive disorder dt hx of TBI and alcohol use disorder  Schizophrenia  Had been residing in group home prior to admission.  Brought to hospital for evaluation of aggressive behaviors. Group home now unwilling to take him back. Has had numerous CODE 21s called this stay. Seen by psych, meds adjusted. Is currently under civil commitment and court hold through [facility] until 7/31/21.  Patient has been undergoing paper work for funding and placement.  - Psychiatry re-consulted 12/15, considering nivia-psych now  - patient has trialed depakote in the past (see [facility] [place] in careeverywhere) but no evidence of clozapine from what I have been able to indentify. Consult psych to see if clozapine is an option  - Continue PRN regimen including olanzapine ODT  Was agitated earlier today needed CODE 21    Hyperlipidemia  Continue statin and aspirin.      COPD  Not O2 dependent.  - Continue salmeterol     Hypothyroidism  Continue levothyroxine     Tobacco Use  Using nicotine patch and PRN gum     Resting tremors of upper extremities  No acute issues      Asymptomatic COVID-19 testing negative on 12/6/2020 [done for discharge planning]     Diet: Room Service  Combination Diet Regular Diet Adult; Safe Tray - NO utensils  Diet    DVT Prophylaxis: Pneumatic Compression Devices  Valentin Catheter: not present  Code Status: Full Code           Diet: Room  Service  Combination Diet Regular Diet Adult; Safe Tray - NO utensils  Diet    DVT Prophylaxis: Pneumatic Compression Devices  Valentin Catheter: not present  Code Status: Full Code           Disposition Plan   Expected discharge: Tomorrow, recommended to secured [facility] once safe disposition plan.  Entered: Felisa Fitch MD 12/23/2020, 2:25 PM       The patient's care was discussed with the Patient.    Felisa Fitch MD  Hospitalist Service  [facility]______________________________________________________________________    Interval History     Resting comfortably in bed  .CODE 21 called wants to go out of here     Data reviewed today: I reviewed all medications, new labs and imaging results over the last 24 hours. I personally reviewed no images or EKG's today.    Physical Exam   Vital Signs: Temp: 95.1  F (35.1  C) Temp src: Oral BP: 93/62 Pulse: 71   Resp: 16 SpO2: 99 % O2 Device: None (Room air)    Weight: 182 lbs 0 oz    Gen - alert, awake in NAD.  Lungs: CTA  Heart: RRR    Data   No lab results found in last 7 days.  No results found for this or any previous visit (from the past 24 hour(s)).  Medications       aspirin  81 mg Oral Daily     atorvastatin  80 mg Oral At Bedtime     benztropine  0.25 mg Oral BID     docusate sodium  100 mg Oral BID     haloperidol  10 mg Oral TID     levothyroxine  88 mcg Oral Daily     memantine  5 mg Oral Daily     nicotine  1 patch Transdermal Daily     nicotine   Transdermal TID     polyethylene glycol  17 g Oral Daily     salmeterol  1 puff Inhalation BID     venlafaxine  75 mg Oral Daily with breakfast

## 2020-12-23 NOTE — CODE/RAPID RESPONSE
[facility]    RRT Note:  CODE 21  12/23/2020   Time Called: 12: 07 PM    RRT called for: Code 21     Assessment & Plan   Behavior disturbance secondary to neurocognitive disorder and schizophrenia   Code 21 called after Mr. Jáurez left his room and demanded to leave the hospital. Mr. Juárez is on a hold which extends to 7/31/2020. Late morning into afternoon seems to be the most difficult time for Mr. Juárez. He has had PRN medications without improvement.     INTERVENTIONS:  - one- time Zyprexa 10- mg ODT     Code Status: Full Code    Sandra Estrada, APRN, CNP  Hospitalist Service, House Officer  [facility]     Text Page  Pager: [phone]    Allergies   Allergies   Allergen Reactions     Ibuprofen      Latex        Physical Exam   Vital Signs with Ranges:  Temp:  [95.1  F (35.1  C)] 95.1  F (35.1  C)  Pulse:  [71-85] 71  Resp:  [16] 16  BP: ()/(62-67) 93/62  SpO2:  [97 %-99 %] 99 %  I/O last 3 completed shifts:  In: 1150 [P.O.:1150]  Out: -     Constitutional: 55- year old male insisting he is not on a hold.    Pulmonary: No obvious acute respiratory distress.   Cardiovascular: Appears adequately perfused.   GI: No obvious GI distress.   Skin/Integumen: No obvious concerning rashes or lesions on exposed skin.   Neuro: Awake, alert. No obvious focal deficits.   Psych:  Pacing, leaving room.   Extremities: Moves all extremities.     CBC with Diff:  Recent Labs   Lab Test 12/16/20  0729 06/02/14 2018 06/02/14 2018   WBC 5.7   < > 6.5   HGB 14.2   < > 14.2   MCV 93   < > 100      < > 122*   INR  --   --  0.99    < > = values in this interval not displayed.        Time Spent on this Encounter   I spent 30 minutes on the unit/floor managing the care of John Juárez. Over 50% of my time was spent counseling the patient and/or coordinating care regarding services listed in this note.

## 2020-12-23 NOTE — PROGRESS NOTES
Care Management Follow Up    Length of Stay (days): 95    Expected Discharge Date: 12/28/20([facility] placement)     Concerns to be Addressed: discharge planning     Patient plan of care discussed at interdisciplinary rounds: Yes    Anticipated Discharge Disposition: Group Home     Anticipated Discharge Services:    Anticipated Discharge DME: None    Patient/family educated on Medicare website which has current facility and service quality ratings:    Education Provided on the Discharge Plan:    Patient/Family in Agreement with the Plan:      Referrals Placed by CM/SW: Post Acute Facilities  Private pay costs discussed: Not applicable    Additional Information:  Patient was declined by [facility].  A referral was made to this facility as the facility does accept patient's who are under commitment or have mental health conditions with behaviors.  The facility did assess and decline due to his physical aggression to others.  Patient is currently on the wait list for two [facility] programs; a [facility] state group home and [facility] ([facility] which is residential facilities for patient's who are not able to be placed in private facilities.  His Commitment  Kurtis Hoffman has put patient on the wait list for both facilities.  Since patient's admission he has been denied by numerous facilities due to his risk of elopement and his physicial aggression.   Plan:  Await placement at a [facility] run facility.        ASIM CastilloSW

## 2020-12-23 NOTE — PROGRESS NOTES
Primary Diagnosis: Dementia with behavioral issues;   PMH: schizophrenia, TBI, ETOH abuse, COPD, smoker.  Orientation: Alert to self   Aggression Stop Light: yellow/green  Mobility: Ind in room, door alarm active, long arm sitter  Pain Management: Denies   Diet: Regular, needs meals ordered. Offer snacks (likes diet [employer]) if appears agitated.  Bowel/Bladder: Continent  Abnormal Lab/Assessments: No labs, Vitals x1 daily, declined nicotine patch.  Drain/Device/Wound: No IV access-MD aware  Consults: SWS  D/C Day/Goals/Place: Non-decisional, [facility], sister will not involve herself. Will likely NOT be able to go to group home/LTC due to aggression and violence    Shift note:       Alert, oriented to self only. He was calm and cooperative through out, occassionally opening the door for  asking question about clothing and discharge planning. Up ind in room.  Regular diet, good appetite. Discharge pending placement, SW following.

## 2020-12-23 NOTE — PROVIDER NOTIFICATION
MD Notification    Notified Person: MD    Notified Person Name: Dr. Fitch     Notification Date/Time: 12/23/20, 15:43     Notification Interaction: text page     Purpose of Notification: Lactic fired, VSS & no signs of infection. Could we cancel lab draw? Concerned lab draw will increase agitation. Thanks!    Re-paged @1700: Patient very agitated today, concerned for lactic acid draw. Please call back, thanks!     Orders Received: Patient is stable, okay to discontinue lactic draw.

## 2020-12-23 NOTE — PLAN OF CARE
Pt alert, disoriented to time and situation. Pt slept well overnight but did come out of room a few times inquiring about his location and length of stay. Pt was calm and cooperative for brief assessment. Denies any pain or discomfort. Reports he is voiding well. Up independently in room, door alarm remains in place. Discharge remains pending safe placement.

## 2020-12-24 PROCEDURE — 99232 SBSQ HOSP IP/OBS MODERATE 35: CPT | Performed by: INTERNAL MEDICINE

## 2020-12-24 PROCEDURE — 250N000013 HC RX MED GY IP 250 OP 250 PS 637: Performed by: HOSPITALIST

## 2020-12-24 PROCEDURE — 250N000013 HC RX MED GY IP 250 OP 250 PS 637: Performed by: PSYCHIATRY & NEUROLOGY

## 2020-12-24 PROCEDURE — 120N000001 HC R&B MED SURG/OB

## 2020-12-24 PROCEDURE — 250N000013 HC RX MED GY IP 250 OP 250 PS 637: Performed by: INTERNAL MEDICINE

## 2020-12-24 RX ADMIN — HALOPERIDOL 10 MG: 5 TABLET ORAL at 21:29

## 2020-12-24 RX ADMIN — HALOPERIDOL 10 MG: 5 TABLET ORAL at 15:57

## 2020-12-24 RX ADMIN — MEMANTINE 5 MG: 5 TABLET ORAL at 09:39

## 2020-12-24 RX ADMIN — Medication 0.25 MG: at 09:40

## 2020-12-24 RX ADMIN — OLANZAPINE 10 MG: 5 TABLET, ORALLY DISINTEGRATING ORAL at 13:34

## 2020-12-24 RX ADMIN — HALOPERIDOL 10 MG: 5 TABLET ORAL at 09:39

## 2020-12-24 RX ADMIN — DOCUSATE SODIUM 100 MG: 100 CAPSULE, LIQUID FILLED ORAL at 21:29

## 2020-12-24 RX ADMIN — POLYETHYLENE GLYCOL 3350 17 G: 17 POWDER, FOR SOLUTION ORAL at 09:39

## 2020-12-24 RX ADMIN — ASPIRIN 81 MG: 81 TABLET, COATED ORAL at 09:40

## 2020-12-24 RX ADMIN — DOCUSATE SODIUM 100 MG: 100 CAPSULE, LIQUID FILLED ORAL at 09:39

## 2020-12-24 RX ADMIN — ATORVASTATIN CALCIUM 80 MG: 40 TABLET, FILM COATED ORAL at 21:29

## 2020-12-24 RX ADMIN — Medication 0.25 MG: at 21:29

## 2020-12-24 RX ADMIN — VENLAFAXINE HYDROCHLORIDE 75 MG: 75 CAPSULE, EXTENDED RELEASE ORAL at 09:39

## 2020-12-24 RX ADMIN — LEVOTHYROXINE SODIUM 88 MCG: 88 TABLET ORAL at 09:39

## 2020-12-24 ASSESSMENT — ACTIVITIES OF DAILY LIVING (ADL)
ADLS_ACUITY_SCORE: 11

## 2020-12-24 NOTE — PLAN OF CARE
5770-1044 no acute events overnight. Oriented to self. NOC VS deferred to reduce agitation and maintain sleep/wake cycles. No aggressive behaviors. Waiting on placement.

## 2020-12-24 NOTE — PLAN OF CARE
Alert to self only. Pleasant this shift ex frequently asking questions about leaving. VSS on RA. Lactic acid fired, patient stable & MD notified - lab draw canceled per MD, do not want to escalate patient. Ind in room. Tolerating regular diet, good intake. Discharge pending placement at [facility].

## 2020-12-24 NOTE — PLAN OF CARE
self only. Got slightly agitated this afternoon, PRN dose of Zyprexa given.  Redirectable with soda.  Up independently.  Discharge disposition pending.

## 2020-12-24 NOTE — PROGRESS NOTES
[facility]    Medicine Progress Note - Hospitalist Service       Date of Admission:  9/9/2020  Assessment & Plan       John Juárez is a 54 year old male admitted on 9/9/2020.  PMHx of schizophrenia, hx of TBI, alcohol use disorder, COPD, hyperlipidemia and hypothyroidism who was admitted on 9/9/2020 for evaluation of aggressive behavior at his group home. His medical condition remains stable, though his group home is now unwilling to take him back. Hospitalization has been prolonged while arranging for new placement.      Neurocognitive disorder dt hx of TBI and alcohol use disorder  Schizophrenia  Had been residing in group home prior to admission.  Brought to hospital for evaluation of aggressive behaviors. Group home now unwilling to take him back. Has had numerous CODE 21s called this stay. Seen by psych, meds adjusted. Is currently under civil commitment and court hold through [facility] until 7/31/21.  Patient has been undergoing paper work for funding and placement.  - Psychiatry re-consulted 12/15, considering nivia-psych now  - patient has trialed depakote in the past ([place] [facility] [place] in careeverywhere) but no evidence of clozapine from what I have been able to indentify. Consult psych to see if clozapine is an option  - Continue PRN regimen including olanzapine ODT  Was agitated and  needed CODE 21 on 12/23   Getting PRN zyprexa for agitation     Hyperlipidemia  Continue statin and aspirin.      COPD  Not O2 dependent.  - Continue salmeterol     Hypothyroidism  Continue levothyroxine     Tobacco Use  Using nicotine patch and PRN gum     Resting tremors of upper extremities  No acute issues      Asymptomatic COVID-19 testing negative on 12/6/2020 [done for discharge planning]     Diet: Room Service  Combination Diet Regular Diet Adult; Safe Tray - NO utensils  Diet    DVT Prophylaxis: Pneumatic Compression Devices  Valentin Catheter: not present  Code Status:  Full Code           Diet: Room Service  Combination Diet Regular Diet Adult; Safe Tray - NO utensils  Diet    DVT Prophylaxis: Pneumatic Compression Devices  Valentin Catheter: not present  Code Status: Full Code           Disposition Plan   Expected discharge: when placement available. Needs to be placed in [facility] run facility due to agitation issues per social work     Entered: Felisa Fitch MD 12/24/2020, 2:57 PM       The patient's care was discussed with the Patient.    Felisa Fitch MD  Hospitalist Service  [facility]______________________________________________________________________    Interval History     Resting comfortably in bed  .Listening to music. No acute complaints    Data reviewed today: I reviewed all medications, new labs and imaging results over the last 24 hours. I personally reviewed no images or EKG's today.    Physical Exam   Vital Signs: Temp: 95.6  F (35.3  C) Temp src: Oral BP: 116/80 Pulse: 91   Resp: 16 SpO2: 97 % O2 Device: None (Room air)    Weight: 182 lbs 0 oz    Gen - alert, awake in NAD.  Lungs: CTA  Heart: RRR    Data   No lab results found in last 7 days.  No results found for this or any previous visit (from the past 24 hour(s)).  Medications       aspirin  81 mg Oral Daily     atorvastatin  80 mg Oral At Bedtime     benztropine  0.25 mg Oral BID     docusate sodium  100 mg Oral BID     haloperidol  10 mg Oral TID     levothyroxine  88 mcg Oral Daily     memantine  5 mg Oral Daily     nicotine  1 patch Transdermal Daily     nicotine   Transdermal TID     polyethylene glycol  17 g Oral Daily     salmeterol  1 puff Inhalation BID     venlafaxine  75 mg Oral Daily with breakfast

## 2020-12-25 PROCEDURE — 250N000013 HC RX MED GY IP 250 OP 250 PS 637: Performed by: HOSPITALIST

## 2020-12-25 PROCEDURE — 120N000001 HC R&B MED SURG/OB

## 2020-12-25 PROCEDURE — 250N000013 HC RX MED GY IP 250 OP 250 PS 637: Performed by: PSYCHIATRY & NEUROLOGY

## 2020-12-25 PROCEDURE — 99231 SBSQ HOSP IP/OBS SF/LOW 25: CPT | Performed by: HOSPITALIST

## 2020-12-25 PROCEDURE — 250N000013 HC RX MED GY IP 250 OP 250 PS 637: Performed by: INTERNAL MEDICINE

## 2020-12-25 RX ADMIN — DOCUSATE SODIUM 100 MG: 100 CAPSULE, LIQUID FILLED ORAL at 21:12

## 2020-12-25 RX ADMIN — LEVOTHYROXINE SODIUM 88 MCG: 88 TABLET ORAL at 08:53

## 2020-12-25 RX ADMIN — DOCUSATE SODIUM 100 MG: 100 CAPSULE, LIQUID FILLED ORAL at 08:53

## 2020-12-25 RX ADMIN — ASPIRIN 81 MG: 81 TABLET, COATED ORAL at 08:53

## 2020-12-25 RX ADMIN — HALOPERIDOL 10 MG: 5 TABLET ORAL at 21:12

## 2020-12-25 RX ADMIN — POLYETHYLENE GLYCOL 3350 17 G: 17 POWDER, FOR SOLUTION ORAL at 08:53

## 2020-12-25 RX ADMIN — Medication 0.25 MG: at 21:12

## 2020-12-25 RX ADMIN — OLANZAPINE 10 MG: 5 TABLET, ORALLY DISINTEGRATING ORAL at 12:03

## 2020-12-25 RX ADMIN — MEMANTINE 5 MG: 5 TABLET ORAL at 08:53

## 2020-12-25 RX ADMIN — VENLAFAXINE HYDROCHLORIDE 75 MG: 75 CAPSULE, EXTENDED RELEASE ORAL at 08:53

## 2020-12-25 RX ADMIN — HALOPERIDOL 10 MG: 5 TABLET ORAL at 16:34

## 2020-12-25 RX ADMIN — Medication 0.25 MG: at 08:53

## 2020-12-25 RX ADMIN — ATORVASTATIN CALCIUM 80 MG: 40 TABLET, FILM COATED ORAL at 21:12

## 2020-12-25 RX ADMIN — HALOPERIDOL 10 MG: 5 TABLET ORAL at 08:53

## 2020-12-25 RX ADMIN — HALOPERIDOL 5 MG: 5 TABLET ORAL at 13:26

## 2020-12-25 ASSESSMENT — ACTIVITIES OF DAILY LIVING (ADL)
ADLS_ACUITY_SCORE: 11

## 2020-12-25 NOTE — PLAN OF CARE
Alert to self only. Pleasant this shift, offered soda to redirect mood. Regular diet tolerating well. Independent in room.

## 2020-12-25 NOTE — PROGRESS NOTES
"[facility]  Hospitalist Progress Note        Myke Bradford MD   12/25/2020        Interval History:      - no acute issues overnight; no significant agitation  - awaiting placement         Assessment and Plan:        John Juárez is a 54 year old male Group Home resident with PMHx of schizophrenia, hx of TBI, alcohol use disorder, COPD, hyperlipidemia and hypothyroidism who was admitted on 9/9/2020 for evaluation of aggressive behavior at his group home.     He has been evaluated by psychiatry, his medical condition remains stable, though his group home is now unwilling to take him back and thus hospitalization has been prolonged awaiting new placement.      Neurocognitive disorder dt hx of TBI and alcohol use disorder  Schizophrenia  Under commitment  - a Group Home resident PTA and was brought to hospital for evaluation of aggressive behaviors. Group home now unwilling to take him back. Has had numerous CODE 21s called this stay. Seen by psych, meds adjusted. Is currently under civil commitment and court hold through [facility] until 7/31/21.  - psych evaluated on several occasions this admission; previously rec [facility] psych; last evaluated on 12/18 and suggested no need for kennedy psych and to continue cuirrent medications without change  - currently on scheduled Haldol 10 mg po TID, namenda, effexor; also on haldol, ativan and zyprexa prn  - last EKG on 12/18, qTc 428  - Patient has been undergoing paper work for funding and placement  - SW following for disposition \"Since patient's admission he has been denied by numerous facilities due to his risk of elopement and his physicial aggression.   Plan:  Await placement at a [facility] run facility.\"    Hyperlipidemia  Continue statin and aspirin.      COPD  Not O2 dependent.  - Continue salmeterol     Hypothyroidism  Continue levothyroxine     Tobacco Use  Using nicotine patch and PRN gum     Resting tremors of upper extremities  No acute " issues      Asymptomatic COVID-19 testing negative on 12/6/2020 [done for discharge planning]     DVT Prophylaxis: Pneumatic Compression Devices    Code Status: Full Code      Disposition Plan     Expected discharge: when placement available. Needs to be placed in [facility] run facility due to agitation issues per social work                    Physical Exam:      Blood pressure 96/65, pulse 69, temperature 97.6  F (36.4  C), temperature source Oral, resp. rate 16, height 1.829 m (6'), weight 82.6 kg (182 lb), SpO2 96 %.  Vitals:    09/09/20 1754 09/25/20 0632 12/11/20 0700   Weight: 70 kg (154 lb 5.2 oz) 71 kg (156 lb 8 oz) 82.6 kg (182 lb)     Vital Signs with Ranges  Temp:  [94.9  F (34.9  C)-97.6  F (36.4  C)] 97.6  F (36.4  C)  Pulse:  [69-96] 69  Resp:  [16] 16  BP: ()/(65-80) 96/65  SpO2:  [96 %-98 %] 96 %  I/O's Last 24 hours  I/O last 3 completed shifts:  In: 480 [P.O.:480]  Out: -          Constitutional: Alert, awake and oriented to self only; resting comfortably in no apparent distress; calm   HEENT: Pupils equal and reactive to light and accomodation   Oral cavity: Moist mucosa   Cardiovascular: Normal s1 s2, regular rate and rhythm, no murmur   Lungs: B/l clear to auscultation, no wheezes or crepitations   Abdomen: Soft, nt, nd, no guarding, rigidity or rebound; BS +   LE : No edema   Musculoskeletal: Power 5/5 in all extremities   Neuro: No focal neurological deficits noted; moving all extremities equally   Psychiatry: normal mood and affect                Medications:          aspirin  81 mg Oral Daily     atorvastatin  80 mg Oral At Bedtime     benztropine  0.25 mg Oral BID     docusate sodium  100 mg Oral BID     haloperidol  10 mg Oral TID     levothyroxine  88 mcg Oral Daily     memantine  5 mg Oral Daily     nicotine  1 patch Transdermal Daily     nicotine   Transdermal TID     polyethylene glycol  17 g Oral Daily     salmeterol  1 puff Inhalation BID     venlafaxine  75 mg Oral Daily with  breakfast     PRN Meds: acetaminophen, albuterol, alum & mag hydroxide-simethicone, haloperidol, LORazepam, melatonin, naloxone **OR** naloxone **OR** naloxone **OR** naloxone, nicotine, OLANZapine, OLANZapine zydis, ondansetron **OR** ondansetron         Data:      All new lab and imaging data was reviewed.   Recent Labs   Lab Test 12/16/20 0729 12/03/20 0833 09/28/20  0854 06/02/14 2018 06/02/14 2018   WBC 5.7 5.4 5.5   < > 6.5   HGB 14.2 16.4 15.9   < > 14.2   MCV 93 92 93   < > 100    179 189   < > 122*   INR  --   --   --   --  0.99    < > = values in this interval not displayed.      Recent Labs   Lab Test 12/16/20 0729 12/03/20  0833 11/02/20  1019    138 142   POTASSIUM 4.0 4.0 4.2   CHLORIDE 110* 109 111*   CO2 29 24 24   BUN 14 18 19   CR 0.68 0.74 0.71   ANIONGAP 3 5 7   LESLEE 8.6 8.8 9.0   * 95 140*     No lab results found.    Invalid input(s): TROP, TROPONINIES

## 2020-12-25 NOTE — PLAN OF CARE
Oriented to self only. Having increase in agitation prn zyprexa and haldol was given. Redirectable with soda. Waiting on placement.

## 2020-12-26 LAB — LACTATE BLD-SCNC: 1.4 MMOL/L (ref 0.7–2)

## 2020-12-26 PROCEDURE — 99231 SBSQ HOSP IP/OBS SF/LOW 25: CPT | Performed by: HOSPITALIST

## 2020-12-26 PROCEDURE — 250N000013 HC RX MED GY IP 250 OP 250 PS 637: Performed by: INTERNAL MEDICINE

## 2020-12-26 PROCEDURE — 120N000001 HC R&B MED SURG/OB

## 2020-12-26 PROCEDURE — 250N000013 HC RX MED GY IP 250 OP 250 PS 637: Performed by: NURSE PRACTITIONER

## 2020-12-26 PROCEDURE — 250N000013 HC RX MED GY IP 250 OP 250 PS 637: Performed by: HOSPITALIST

## 2020-12-26 PROCEDURE — 36415 COLL VENOUS BLD VENIPUNCTURE: CPT | Performed by: HOSPITALIST

## 2020-12-26 PROCEDURE — 83605 ASSAY OF LACTIC ACID: CPT | Performed by: HOSPITALIST

## 2020-12-26 PROCEDURE — 250N000013 HC RX MED GY IP 250 OP 250 PS 637: Performed by: PSYCHIATRY & NEUROLOGY

## 2020-12-26 RX ADMIN — VENLAFAXINE HYDROCHLORIDE 75 MG: 75 CAPSULE, EXTENDED RELEASE ORAL at 09:26

## 2020-12-26 RX ADMIN — DOCUSATE SODIUM 100 MG: 100 CAPSULE, LIQUID FILLED ORAL at 09:25

## 2020-12-26 RX ADMIN — MEMANTINE 5 MG: 5 TABLET ORAL at 09:26

## 2020-12-26 RX ADMIN — OLANZAPINE 10 MG: 5 TABLET, ORALLY DISINTEGRATING ORAL at 05:20

## 2020-12-26 RX ADMIN — SALMETEROL XINAFOATE 1 PUFF: 50 POWDER, METERED ORAL; RESPIRATORY (INHALATION) at 21:28

## 2020-12-26 RX ADMIN — Medication 0.25 MG: at 21:28

## 2020-12-26 RX ADMIN — DOCUSATE SODIUM 100 MG: 100 CAPSULE, LIQUID FILLED ORAL at 21:28

## 2020-12-26 RX ADMIN — ATORVASTATIN CALCIUM 80 MG: 40 TABLET, FILM COATED ORAL at 21:28

## 2020-12-26 RX ADMIN — LEVOTHYROXINE SODIUM 88 MCG: 88 TABLET ORAL at 09:26

## 2020-12-26 RX ADMIN — HALOPERIDOL 10 MG: 5 TABLET ORAL at 09:26

## 2020-12-26 RX ADMIN — HALOPERIDOL 5 MG: 5 TABLET ORAL at 13:19

## 2020-12-26 RX ADMIN — NICOTINE 1 PATCH: 21 PATCH, EXTENDED RELEASE TRANSDERMAL at 09:27

## 2020-12-26 RX ADMIN — HALOPERIDOL 10 MG: 5 TABLET ORAL at 21:28

## 2020-12-26 RX ADMIN — Medication 0.25 MG: at 09:26

## 2020-12-26 RX ADMIN — HALOPERIDOL 10 MG: 5 TABLET ORAL at 15:37

## 2020-12-26 RX ADMIN — ASPIRIN 81 MG: 81 TABLET, COATED ORAL at 09:26

## 2020-12-26 ASSESSMENT — ACTIVITIES OF DAILY LIVING (ADL)
ADLS_ACUITY_SCORE: 11

## 2020-12-26 NOTE — PROGRESS NOTES
"[facility]  Hospitalist Progress Note        Myke Bradford MD   12/26/2020        Interval History:      - was slightly agitated fixated on leaving hospital this morning requiring prn PO Zyprexa around 0520  - awaiting placement  - no other acute issues overnight         Assessment and Plan:        John Juárez is a 54 year old male Group Home resident with PMHx of schizophrenia, hx of TBI, alcohol use disorder, COPD, hyperlipidemia and hypothyroidism who was admitted on 9/9/2020 for evaluation of aggressive behavior at his group home.     He has been evaluated by psychiatry, his medical condition remains stable, though his group home is now unwilling to take him back and thus hospitalization has been prolonged awaiting new placement.      Neurocognitive disorder dt hx of TBI and alcohol use disorder  Schizophrenia  Under commitment  - a Group Home resident PTA and was brought to hospital for evaluation of aggressive behaviors. Group home now unwilling to take him back. Has had numerous CODE 21s called this stay. Seen by psych, meds adjusted. Is currently under civil commitment and court hold through [facility] until 7/31/21.  - psych evaluated on several occasions this admission; previously rec [facility] psych; last evaluated on 12/18 and suggested no need for kennedy psych and to continue cuirrent medications without change  - currently on scheduled Haldol 10 mg po TID, namenda, effexor; also on haldol, ativan and zyprexa prn; does have intermittent agitation  - last EKG on 12/18, qTc 428  - Patient has been undergoing paper work for funding and placement  -  following for disposition \"Since patient's admission he has been denied by numerous facilities due to his risk of elopement and his physicial aggression.   Plan:  Await placement at a [facility] facility.\"    Hyperlipidemia  Continue statin and aspirin.      COPD  Not O2 dependent.  - Continue " salmeterol     Hypothyroidism  Continue levothyroxine     Tobacco Use  Using nicotine patch and PRN gum     Resting tremors of upper extremities  No acute issues      Asymptomatic COVID-19 testing negative on 12/6/2020 [done for discharge planning]     DVT Prophylaxis: Pneumatic Compression Devices    Code Status: Full Code      Disposition Plan     Expected discharge: when placement available. Needs to be placed in [facility] facility due to agitation issues per social work                    Physical Exam:      Blood pressure 96/65, pulse 69, temperature 97.6  F (36.4  C), temperature source Oral, resp. rate 16, height 1.829 m (6'), weight 82.6 kg (182 lb), SpO2 96 %.  Vitals:    09/09/20 1754 09/25/20 0632 12/11/20 0700   Weight: 70 kg (154 lb 5.2 oz) 71 kg (156 lb 8 oz) 82.6 kg (182 lb)     Vital Signs with Ranges     I/O's Last 24 hours  I/O last 3 completed shifts:  In: 480 [P.O.:480]  Out: -      O/E:    Alert, awake and oriented to self only; resting comfortably in no apparent distress; calm            Medications:          aspirin  81 mg Oral Daily     atorvastatin  80 mg Oral At Bedtime     benztropine  0.25 mg Oral BID     docusate sodium  100 mg Oral BID     haloperidol  10 mg Oral TID     levothyroxine  88 mcg Oral Daily     memantine  5 mg Oral Daily     nicotine  1 patch Transdermal Daily     nicotine   Transdermal TID     polyethylene glycol  17 g Oral Daily     salmeterol  1 puff Inhalation BID     venlafaxine  75 mg Oral Daily with breakfast     PRN Meds: acetaminophen, albuterol, alum & mag hydroxide-simethicone, haloperidol, LORazepam, melatonin, naloxone **OR** naloxone **OR** naloxone **OR** naloxone, nicotine, OLANZapine, OLANZapine zydis, ondansetron **OR** ondansetron         Data:      All new lab and imaging data was reviewed.   Recent Labs   Lab Test 12/16/20  0729 12/03/20  0833 09/28/20  0854 06/02/14 2018 06/02/14 2018   WBC 5.7 5.4 5.5   < > 6.5   HGB 14.2 16.4 15.9   < > 14.2    MCV 93 92 93   < > 100    179 189   < > 122*   INR  --   --   --   --  0.99    < > = values in this interval not displayed.      Recent Labs   Lab Test 12/16/20  0729 12/03/20  0833 11/02/20  1019    138 142   POTASSIUM 4.0 4.0 4.2   CHLORIDE 110* 109 111*   CO2 29 24 24   BUN 14 18 19   CR 0.68 0.74 0.71   ANIONGAP 3 5 7   LESLEE 8.6 8.8 9.0   * 95 140*     No lab results found.    Invalid input(s): TROP, TROPONINIES

## 2020-12-26 NOTE — PLAN OF CARE
oriented to self. Pleasant this shift, redirectable. Makes needs known. Waiting for placement, SW following, waiting on 2 different [facility] programs.    0520- pt starting to escalate, fixated on leaving hospital, looking for clothes, etc. Pt did not get aggressive w/ staff but given PRN zyprexa ODT to prevent further escalation.

## 2020-12-26 NOTE — PLAN OF CARE
Pt is A and O  to self only.Able to make needs known.  Stable this evening with no agitation. Regular diet, good appetite. Likes soda. Scheduled haldol given. Discharge pending placement.

## 2020-12-27 LAB — LACTATE BLD-SCNC: 1.8 MMOL/L (ref 0.7–2)

## 2020-12-27 PROCEDURE — 250N000013 HC RX MED GY IP 250 OP 250 PS 637: Performed by: PSYCHIATRY & NEUROLOGY

## 2020-12-27 PROCEDURE — 250N000013 HC RX MED GY IP 250 OP 250 PS 637: Performed by: NURSE PRACTITIONER

## 2020-12-27 PROCEDURE — 250N000013 HC RX MED GY IP 250 OP 250 PS 637: Performed by: INTERNAL MEDICINE

## 2020-12-27 PROCEDURE — 83605 ASSAY OF LACTIC ACID: CPT | Performed by: HOSPITALIST

## 2020-12-27 PROCEDURE — 120N000001 HC R&B MED SURG/OB

## 2020-12-27 PROCEDURE — 36415 COLL VENOUS BLD VENIPUNCTURE: CPT | Performed by: HOSPITALIST

## 2020-12-27 PROCEDURE — 250N000013 HC RX MED GY IP 250 OP 250 PS 637: Performed by: HOSPITALIST

## 2020-12-27 RX ORDER — WATER 10 ML/10ML
INJECTION INTRAMUSCULAR; INTRAVENOUS; SUBCUTANEOUS
Status: DISCONTINUED
Start: 2020-12-27 | End: 2020-12-27 | Stop reason: HOSPADM

## 2020-12-27 RX ADMIN — VENLAFAXINE HYDROCHLORIDE 75 MG: 75 CAPSULE, EXTENDED RELEASE ORAL at 09:29

## 2020-12-27 RX ADMIN — HALOPERIDOL 10 MG: 5 TABLET ORAL at 21:13

## 2020-12-27 RX ADMIN — HALOPERIDOL 10 MG: 5 TABLET ORAL at 09:29

## 2020-12-27 RX ADMIN — DOCUSATE SODIUM 100 MG: 100 CAPSULE, LIQUID FILLED ORAL at 09:29

## 2020-12-27 RX ADMIN — DOCUSATE SODIUM 100 MG: 100 CAPSULE, LIQUID FILLED ORAL at 21:13

## 2020-12-27 RX ADMIN — LEVOTHYROXINE SODIUM 88 MCG: 88 TABLET ORAL at 09:29

## 2020-12-27 RX ADMIN — MEMANTINE 5 MG: 5 TABLET ORAL at 09:29

## 2020-12-27 RX ADMIN — ASPIRIN 81 MG: 81 TABLET, COATED ORAL at 09:29

## 2020-12-27 RX ADMIN — Medication 0.25 MG: at 21:13

## 2020-12-27 RX ADMIN — Medication 0.25 MG: at 09:29

## 2020-12-27 RX ADMIN — HALOPERIDOL 10 MG: 5 TABLET ORAL at 16:04

## 2020-12-27 RX ADMIN — OLANZAPINE 10 MG: 5 TABLET, ORALLY DISINTEGRATING ORAL at 03:19

## 2020-12-27 RX ADMIN — POLYETHYLENE GLYCOL 3350 17 G: 17 POWDER, FOR SOLUTION ORAL at 09:30

## 2020-12-27 RX ADMIN — LORAZEPAM 1 MG: 1 TABLET ORAL at 05:25

## 2020-12-27 RX ADMIN — ATORVASTATIN CALCIUM 80 MG: 40 TABLET, FILM COATED ORAL at 21:13

## 2020-12-27 RX ADMIN — HALOPERIDOL 5 MG: 5 TABLET ORAL at 02:24

## 2020-12-27 RX ADMIN — SALMETEROL XINAFOATE 1 PUFF: 50 POWDER, METERED ORAL; RESPIRATORY (INHALATION) at 21:13

## 2020-12-27 RX ADMIN — NICOTINE 1 PATCH: 21 PATCH, EXTENDED RELEASE TRANSDERMAL at 10:47

## 2020-12-27 ASSESSMENT — ACTIVITIES OF DAILY LIVING (ADL)
ADLS_ACUITY_SCORE: 11

## 2020-12-27 NOTE — PLAN OF CARE
Oriented to self only - very agitated & anxious overnight. Pt stated he needed to go to the movies, then began stating that sister was talking to him in his room & was trying to leave hospital because sister was here to get him - tried multiple times to redirect & PRN haldol, was not effective. [place] called for increased agitation, stating he was hearing voices, & refusing PO meds - finally agreed to PO Zyprexa after security showed up, did not improve agitation. Increased agitation again around 0500, tried redirecting w/ food & music, did not work - PRN PO ativan given. Has not slept overnight. Regular diet. Door alarm in place. Nicotine patch to L shoulder. Discharge pending placement @ [facility] run facility - SW following.

## 2020-12-27 NOTE — PLAN OF CARE
Pt is A and O  to self only. Able to make needs known.  VSS.  Sepsis protocol fired, negative for sepsis. On episode of agitation midday, managed with prn Haldol. Regular diet, good appetite. Discharge pending placement.

## 2020-12-27 NOTE — PROGRESS NOTES
Brief, no charge note    - intermittent agitation requiring prn haldol/zyprexa and redirection, otherwise no acute events and awaiting placement  - see progress notes from 12/26 for details

## 2020-12-28 PROCEDURE — 250N000013 HC RX MED GY IP 250 OP 250 PS 637: Performed by: INTERNAL MEDICINE

## 2020-12-28 PROCEDURE — 250N000013 HC RX MED GY IP 250 OP 250 PS 637: Performed by: HOSPITALIST

## 2020-12-28 PROCEDURE — 250N000013 HC RX MED GY IP 250 OP 250 PS 637: Performed by: PSYCHIATRY & NEUROLOGY

## 2020-12-28 PROCEDURE — 120N000001 HC R&B MED SURG/OB

## 2020-12-28 PROCEDURE — 99231 SBSQ HOSP IP/OBS SF/LOW 25: CPT | Performed by: HOSPITALIST

## 2020-12-28 PROCEDURE — 250N000013 HC RX MED GY IP 250 OP 250 PS 637: Performed by: NURSE PRACTITIONER

## 2020-12-28 RX ADMIN — HALOPERIDOL 10 MG: 5 TABLET ORAL at 21:13

## 2020-12-28 RX ADMIN — Medication 0.25 MG: at 21:13

## 2020-12-28 RX ADMIN — ATORVASTATIN CALCIUM 80 MG: 40 TABLET, FILM COATED ORAL at 21:13

## 2020-12-28 RX ADMIN — HALOPERIDOL 10 MG: 5 TABLET ORAL at 09:58

## 2020-12-28 RX ADMIN — HALOPERIDOL 10 MG: 5 TABLET ORAL at 15:44

## 2020-12-28 RX ADMIN — DOCUSATE SODIUM 100 MG: 100 CAPSULE, LIQUID FILLED ORAL at 21:13

## 2020-12-28 RX ADMIN — VENLAFAXINE HYDROCHLORIDE 75 MG: 75 CAPSULE, EXTENDED RELEASE ORAL at 09:57

## 2020-12-28 RX ADMIN — OLANZAPINE 10 MG: 5 TABLET, ORALLY DISINTEGRATING ORAL at 02:48

## 2020-12-28 RX ADMIN — LORAZEPAM 1 MG: 1 TABLET ORAL at 19:04

## 2020-12-28 RX ADMIN — ASPIRIN 81 MG: 81 TABLET, COATED ORAL at 09:58

## 2020-12-28 RX ADMIN — MEMANTINE 5 MG: 5 TABLET ORAL at 09:58

## 2020-12-28 RX ADMIN — Medication 0.25 MG: at 09:57

## 2020-12-28 RX ADMIN — OLANZAPINE 10 MG: 5 TABLET, ORALLY DISINTEGRATING ORAL at 15:44

## 2020-12-28 RX ADMIN — NICOTINE 1 PATCH: 21 PATCH, EXTENDED RELEASE TRANSDERMAL at 09:59

## 2020-12-28 RX ADMIN — SALMETEROL XINAFOATE 1 PUFF: 50 POWDER, METERED ORAL; RESPIRATORY (INHALATION) at 10:06

## 2020-12-28 RX ADMIN — LEVOTHYROXINE SODIUM 88 MCG: 88 TABLET ORAL at 09:57

## 2020-12-28 RX ADMIN — DOCUSATE SODIUM 100 MG: 100 CAPSULE, LIQUID FILLED ORAL at 09:58

## 2020-12-28 RX ADMIN — POLYETHYLENE GLYCOL 3350 17 G: 17 POWDER, FOR SOLUTION ORAL at 09:58

## 2020-12-28 ASSESSMENT — ACTIVITIES OF DAILY LIVING (ADL)
ADLS_ACUITY_SCORE: 15
ADLS_ACUITY_SCORE: 11
ADLS_ACUITY_SCORE: 15
ADLS_ACUITY_SCORE: 15
ADLS_ACUITY_SCORE: 11
ADLS_ACUITY_SCORE: 11

## 2020-12-28 NOTE — PROGRESS NOTES
"[facility]  Hospitalist Progress Note        Myke Bradford MD   12/28/2020        Interval History:      - no acute issues ; required prn Zyprexa X 1         Assessment and Plan:        John Juárez is a 54 year old male Group Home resident with PMHx of schizophrenia, hx of TBI, alcohol use disorder, COPD, hyperlipidemia and hypothyroidism who was admitted on 9/9/2020 for evaluation of aggressive behavior at his group home.     He has been evaluated by psychiatry, his medical condition remains stable, though his group home is now unwilling to take him back and thus hospitalization has been prolonged awaiting new placement.      Neurocognitive disorder dt hx of TBI and alcohol use disorder  Schizophrenia  Under commitment  - a Group Home resident PTA and was brought to hospital for evaluation of aggressive behaviors. Group home now unwilling to take him back. Has had numerous CODE 21s called this stay. Seen by psych, meds adjusted. Is currently under civil commitment and court hold through [facility] until 7/31/21.  - psych evaluated on several occasions this admission; previously rec [facility] psych; last evaluated on 12/18 and suggested no need for kennedy psych and to continue current medications without change  - currently on scheduled Haldol 10 mg po TID, namenda, effexor; also on haldol, ativan and zyprexa prn; does have intermittent agitation  - last EKG on 12/18, qTc 428  - Patient has been undergoing paper work for funding and placement  -  following for disposition \"Since patient's admission he has been denied by numerous facilities due to his risk of elopement and his physicial aggression.   Plan:  Await placement at a [facility] run facility.\"    Hyperlipidemia  Continue statin and aspirin.      COPD  Not O2 dependent.  - Continue salmeterol     Hypothyroidism  Continue levothyroxine     Tobacco Use  Using nicotine patch and PRN gum     Resting tremors of upper extremities  No " acute issues      Asymptomatic COVID-19 testing negative on 12/6/2020 [done for discharge planning]     DVT Prophylaxis: Pneumatic Compression Devices    Code Status: Full Code      Disposition Plan     Expected discharge: when placement available. Needs to be placed in [facility] run facility due to agitation issues per social work                    Physical Exam:      Blood pressure 106/68, pulse 84, temperature 96.1  F (35.6  C), temperature source Oral, resp. rate 18, height 1.829 m (6'), weight 82.6 kg (182 lb), SpO2 100 %.  Vitals:    09/09/20 1754 09/25/20 0632 12/11/20 0700   Weight: 70 kg (154 lb 5.2 oz) 71 kg (156 lb 8 oz) 82.6 kg (182 lb)     Vital Signs with Ranges  Temp:  [96.1  F (35.6  C)-97.4  F (36.3  C)] 96.1  F (35.6  C)  Pulse:  [] 84  Resp:  [18] 18  BP: ()/(66-72) 106/68  SpO2:  [100 %] 100 %  I/O's Last 24 hours  No intake/output data recorded.     O/E:    Alert, awake and oriented to self only; resting comfortably in no apparent distress; calm  Lung b/l clear  CVS: regular rate and rhythm  No edema            Medications:          aspirin  81 mg Oral Daily     atorvastatin  80 mg Oral At Bedtime     benztropine  0.25 mg Oral BID     docusate sodium  100 mg Oral BID     haloperidol  10 mg Oral TID     levothyroxine  88 mcg Oral Daily     memantine  5 mg Oral Daily     nicotine  1 patch Transdermal Daily     nicotine   Transdermal TID     polyethylene glycol  17 g Oral Daily     salmeterol  1 puff Inhalation BID     venlafaxine  75 mg Oral Daily with breakfast     PRN Meds: acetaminophen, albuterol, alum & mag hydroxide-simethicone, haloperidol, LORazepam, melatonin, naloxone **OR** naloxone **OR** naloxone **OR** naloxone, nicotine, OLANZapine, OLANZapine zydis, ondansetron **OR** ondansetron         Data:      All new lab and imaging data was reviewed.   Recent Labs   Lab Test 12/16/20  0729 12/03/20  0833 09/28/20  0854 06/02/14 2018 06/02/14 2018   WBC 5.7 5.4 5.5   < > 6.5    HGB 14.2 16.4 15.9   < > 14.2   MCV 93 92 93   < > 100    179 189   < > 122*   INR  --   --   --   --  0.99    < > = values in this interval not displayed.      Recent Labs   Lab Test 12/16/20  0729 12/03/20  0833 11/02/20  1019    138 142   POTASSIUM 4.0 4.0 4.2   CHLORIDE 110* 109 111*   CO2 29 24 24   BUN 14 18 19   CR 0.68 0.74 0.71   ANIONGAP 3 5 7   LESLEE 8.6 8.8 9.0   * 95 140*     No lab results found.    Invalid input(s): TROP, TROPONINIES

## 2020-12-28 NOTE — PLAN OF CARE
Pt alert to self. Calm and cooperative this shift, flat affect. VSS on RA ex BP soft. Denies pain. Up independent in room. One incont episode (pt urinated on BR floor and pants). Good appetite. Nicotine patch to L shoulder. Discharge to state facility pending guardianship pending, continue to monitor.

## 2020-12-28 NOTE — PLAN OF CARE
A&Ox1.  Denies pain.  Calm, cooperative and redirectable throughout the night; PRN Zyprexa given once. Tolerating regular diet.  Nicotine patch to R shoulder.  Discharge pending, probably 12/30.       Mid-Level Procedure Text (C): After obtaining clear surgical margins the patient was sent to a mid-level provider for surgical repair.  The patient understands they will receive post-surgical care and follow-up from the mid-level provider.

## 2020-12-28 NOTE — PLAN OF CARE
VSS, soft BP's at times.  Door alarm on.  Slept between cares today.  Discharge pending placement.  Will continue to monitor.

## 2020-12-29 PROCEDURE — 250N000013 HC RX MED GY IP 250 OP 250 PS 637: Performed by: HOSPITALIST

## 2020-12-29 PROCEDURE — 250N000013 HC RX MED GY IP 250 OP 250 PS 637: Performed by: NURSE PRACTITIONER

## 2020-12-29 PROCEDURE — 120N000001 HC R&B MED SURG/OB

## 2020-12-29 PROCEDURE — 250N000013 HC RX MED GY IP 250 OP 250 PS 637: Performed by: INTERNAL MEDICINE

## 2020-12-29 PROCEDURE — 99231 SBSQ HOSP IP/OBS SF/LOW 25: CPT | Performed by: INTERNAL MEDICINE

## 2020-12-29 PROCEDURE — 250N000013 HC RX MED GY IP 250 OP 250 PS 637: Performed by: PSYCHIATRY & NEUROLOGY

## 2020-12-29 RX ADMIN — OLANZAPINE 10 MG: 5 TABLET, ORALLY DISINTEGRATING ORAL at 09:19

## 2020-12-29 RX ADMIN — LEVOTHYROXINE SODIUM 88 MCG: 88 TABLET ORAL at 09:18

## 2020-12-29 RX ADMIN — HALOPERIDOL 5 MG: 5 TABLET ORAL at 12:25

## 2020-12-29 RX ADMIN — DOCUSATE SODIUM 100 MG: 100 CAPSULE, LIQUID FILLED ORAL at 09:19

## 2020-12-29 RX ADMIN — POLYETHYLENE GLYCOL 3350 17 G: 17 POWDER, FOR SOLUTION ORAL at 12:26

## 2020-12-29 RX ADMIN — MEMANTINE 5 MG: 5 TABLET ORAL at 09:19

## 2020-12-29 RX ADMIN — NICOTINE 1 PATCH: 21 PATCH, EXTENDED RELEASE TRANSDERMAL at 09:20

## 2020-12-29 RX ADMIN — Medication 0.25 MG: at 21:35

## 2020-12-29 RX ADMIN — HALOPERIDOL 10 MG: 5 TABLET ORAL at 18:21

## 2020-12-29 RX ADMIN — NICOTINE POLACRILEX 2 MG: 2 GUM, CHEWING ORAL at 09:18

## 2020-12-29 RX ADMIN — HALOPERIDOL 10 MG: 5 TABLET ORAL at 09:19

## 2020-12-29 RX ADMIN — SALMETEROL XINAFOATE 1 PUFF: 50 POWDER, METERED ORAL; RESPIRATORY (INHALATION) at 21:35

## 2020-12-29 RX ADMIN — ATORVASTATIN CALCIUM 80 MG: 40 TABLET, FILM COATED ORAL at 21:35

## 2020-12-29 RX ADMIN — DOCUSATE SODIUM 100 MG: 100 CAPSULE, LIQUID FILLED ORAL at 21:35

## 2020-12-29 RX ADMIN — Medication 0.25 MG: at 09:19

## 2020-12-29 RX ADMIN — MELATONIN TAB 3 MG 3 MG: 3 TAB at 21:35

## 2020-12-29 RX ADMIN — ASPIRIN 81 MG: 81 TABLET, COATED ORAL at 09:19

## 2020-12-29 RX ADMIN — HALOPERIDOL 10 MG: 5 TABLET ORAL at 23:18

## 2020-12-29 RX ADMIN — VENLAFAXINE HYDROCHLORIDE 75 MG: 75 CAPSULE, EXTENDED RELEASE ORAL at 09:19

## 2020-12-29 ASSESSMENT — ACTIVITIES OF DAILY LIVING (ADL)
ADLS_ACUITY_SCORE: 15

## 2020-12-29 NOTE — PLAN OF CARE
AO1 to self only. Calm overnight. Slept most of the night. Independent in room. No IV access: MD aware. No nicotine patch on. Did not give any PRN meds overnight. Easily redirectable. Discharge to state facility pending guardianship pending, continue to monitor.

## 2020-12-29 NOTE — PLAN OF CARE
Pt alert to self. Restless at times this shift, flat affect, Zyprexa x1, Ativan x1, sched Haldol. Denies pain. Up independent in room. Good appetite. Pt refusing assessment for nicotine patch but reports he does not have one on at this time. Discharge to state facility pending guardianship pending, continue to monitor.

## 2020-12-29 NOTE — PLAN OF CARE
Pt alert to self, frequently needs reminders that he's at the hospital and will be leaving as soon as placement is found. VSS, re-directable. Haldol and Zyprexa x1. Up IND in room. BM 12/28 (per pt) Left delt nicotine patch. Regular diet-good appetite. Door alarm on. Plan: Discharge to state facility once room available

## 2020-12-29 NOTE — PROGRESS NOTES
"[facility]  Hospitalist Progress Note        Felisa Fitch MD   12/29/2020        Interval History:      - no acute issues . Resting comfortably in bed          Assessment and Plan:        John Juárez is a 54 year old male Group Home resident with PMHx of schizophrenia, hx of TBI, alcohol use disorder, COPD, hyperlipidemia and hypothyroidism who was admitted on 9/9/2020 for evaluation of aggressive behavior at his group home.     He has been evaluated by psychiatry, his medical condition remains stable, though his group home is now unwilling to take him back and thus hospitalization has been prolonged awaiting new placement.      Neurocognitive disorder dt hx of TBI and alcohol use disorder  Schizophrenia  Under commitment  - a Group Home resident PTA and was brought to hospital for evaluation of aggressive behaviors. Group home now unwilling to take him back. Has had numerous CODE 21s called this stay. Seen by psych, meds adjusted. Is currently under civil commitment and court hold through [facility] until 7/31/21.  - psych evaluated on several occasions this admission; previously rec [facility] psych; last evaluated on 12/18 and suggested no need for kennedy psych and to continue current medications without change  - currently on scheduled Haldol 10 mg po TID, namenda, effexor; also on haldol, ativan and zyprexa prn; does have intermittent agitation  - last EKG on 12/18, qTc 428  - Patient has been undergoing paper work for funding and placement  -  following for disposition \"Since patient's admission he has been denied by numerous facilities due to his risk of elopement and his physicial aggression.   Plan:  Await placement at a [facility] run facility.\"    Hyperlipidemia  Continue statin and aspirin.      COPD  Not O2 dependent.  - Continue salmeterol     Hypothyroidism  Continue levothyroxine     Tobacco Use  Using nicotine patch and PRN gum     Resting tremors of upper extremities  No acute " issues      Asymptomatic COVID-19 testing negative on 12/6/2020 [done for discharge planning]     DVT Prophylaxis: Pneumatic Compression Devices    Code Status: Full Code      Disposition Plan     Expected discharge: when placement available. Needs to be placed in [place] state run facility due to agitation issues per social work     Felisa Fitch MD   Page [phone](7AM-6PM)                     Physical Exam:      Blood pressure 94/63, pulse 83, temperature 96.7  F (35.9  C), temperature source Oral, resp. rate 16, height 1.829 m (6'), weight 82.6 kg (182 lb), SpO2 99 %.  Vitals:    09/09/20 1754 09/25/20 0632 12/11/20 0700   Weight: 70 kg (154 lb 5.2 oz) 71 kg (156 lb 8 oz) 82.6 kg (182 lb)     Vital Signs with Ranges  Temp:  [96.7  F (35.9  C)] 96.7  F (35.9  C)  Pulse:  [83] 83  Resp:  [16] 16  BP: (94)/(63) 94/63  SpO2:  [99 %] 99 %  I/O's Last 24 hours  I/O last 3 completed shifts:  In: 240 [P.O.:240]  Out: -      O/E:    Alert, awake and oriented to self only; resting comfortably in no apparent distress; calm  Lung b/l clear  CVS: regular rate and rhythm  No edema            Medications:          aspirin  81 mg Oral Daily     atorvastatin  80 mg Oral At Bedtime     benztropine  0.25 mg Oral BID     docusate sodium  100 mg Oral BID     haloperidol  10 mg Oral TID     levothyroxine  88 mcg Oral Daily     memantine  5 mg Oral Daily     nicotine  1 patch Transdermal Daily     nicotine   Transdermal TID     polyethylene glycol  17 g Oral Daily     salmeterol  1 puff Inhalation BID     venlafaxine  75 mg Oral Daily with breakfast     PRN Meds: acetaminophen, albuterol, alum & mag hydroxide-simethicone, haloperidol, LORazepam, melatonin, naloxone **OR** naloxone **OR** naloxone **OR** naloxone, nicotine, OLANZapine, OLANZapine zydis, ondansetron **OR** ondansetron         Data:      All new lab and imaging data was reviewed.   Recent Labs   Lab Test 12/16/20  0729 12/03/20  0833 09/28/20  0854 06/02/14 2018  06/02/14 2018   WBC 5.7 5.4 5.5   < > 6.5   HGB 14.2 16.4 15.9   < > 14.2   MCV 93 92 93   < > 100    179 189   < > 122*   INR  --   --   --   --  0.99    < > = values in this interval not displayed.      Recent Labs   Lab Test 12/16/20  0729 12/03/20  0833 11/02/20  1019    138 142   POTASSIUM 4.0 4.0 4.2   CHLORIDE 110* 109 111*   CO2 29 24 24   BUN 14 18 19   CR 0.68 0.74 0.71   ANIONGAP 3 5 7   LESLEE 8.6 8.8 9.0   * 95 140*     No lab results found.    Invalid input(s): TROP, TROPONINIES

## 2020-12-30 PROCEDURE — 99231 SBSQ HOSP IP/OBS SF/LOW 25: CPT | Performed by: INTERNAL MEDICINE

## 2020-12-30 PROCEDURE — 250N000013 HC RX MED GY IP 250 OP 250 PS 637: Performed by: INTERNAL MEDICINE

## 2020-12-30 PROCEDURE — 120N000001 HC R&B MED SURG/OB

## 2020-12-30 PROCEDURE — 250N000013 HC RX MED GY IP 250 OP 250 PS 637: Performed by: HOSPITALIST

## 2020-12-30 PROCEDURE — 250N000013 HC RX MED GY IP 250 OP 250 PS 637: Performed by: PSYCHIATRY & NEUROLOGY

## 2020-12-30 RX ADMIN — ATORVASTATIN CALCIUM 80 MG: 40 TABLET, FILM COATED ORAL at 22:09

## 2020-12-30 RX ADMIN — MEMANTINE 5 MG: 5 TABLET ORAL at 08:46

## 2020-12-30 RX ADMIN — DOCUSATE SODIUM 100 MG: 100 CAPSULE, LIQUID FILLED ORAL at 08:46

## 2020-12-30 RX ADMIN — HALOPERIDOL 10 MG: 5 TABLET ORAL at 15:48

## 2020-12-30 RX ADMIN — HALOPERIDOL 5 MG: 5 TABLET ORAL at 13:34

## 2020-12-30 RX ADMIN — ASPIRIN 81 MG: 81 TABLET, COATED ORAL at 08:45

## 2020-12-30 RX ADMIN — VENLAFAXINE HYDROCHLORIDE 75 MG: 75 CAPSULE, EXTENDED RELEASE ORAL at 08:46

## 2020-12-30 RX ADMIN — LEVOTHYROXINE SODIUM 88 MCG: 88 TABLET ORAL at 08:46

## 2020-12-30 RX ADMIN — HALOPERIDOL 10 MG: 5 TABLET ORAL at 08:45

## 2020-12-30 RX ADMIN — Medication 0.25 MG: at 08:46

## 2020-12-30 ASSESSMENT — ACTIVITIES OF DAILY LIVING (ADL)
ADLS_ACUITY_SCORE: 15

## 2020-12-30 NOTE — PLAN OF CARE
Oriented to self, alert. Calm and cooperative this shift, no PRN given. Nicotine patch not in place. Adequate appetite. Discharge pending when bed available at [facility] facility.

## 2020-12-30 NOTE — PROGRESS NOTES
"[facility]  Hospitalist Progress Note        Felisa Fitch MD   12/30/2020        Interval History:      - no acute issues . Resting comfortably in bed          Assessment and Plan:        John Juárez is a 54 year old male Group Home resident with PMHx of schizophrenia, hx of TBI, alcohol use disorder, COPD, hyperlipidemia and hypothyroidism who was admitted on 9/9/2020 for evaluation of aggressive behavior at his group home.     He has been evaluated by psychiatry, his medical condition remains stable, though his group home is now unwilling to take him back and thus hospitalization has been prolonged awaiting new placement.      Neurocognitive disorder dt hx of TBI and alcohol use disorder  Schizophrenia  Under commitment  - a Group Home resident PTA and was brought to hospital for evaluation of aggressive behaviors. Group home now unwilling to take him back. Has had numerous CODE 21s called this stay. Seen by psych, meds adjusted. Is currently under civil commitment and court hold through [facility] until 7/31/21.  - psych evaluated on several occasions this admission; previously rec kennedy psych; last evaluated on 12/18 and suggested no need for kennedy psych and to continue current medications without change  - currently on scheduled Haldol 10 mg po TID, namenda, effexor; also on haldol, ativan and zyprexa prn; does have intermittent agitation  - last EKG on 12/18, qTc 428  - Patient has been undergoing paper work for funding and placement  -  following for disposition \"Since patient's admission he has been denied by numerous facilities due to his risk of elopement and his physicial aggression.   Plan:  Await placement at a [facility] run facility.\"    Hyperlipidemia  Continue statin and aspirin.      COPD  Not O2 dependent.  - Continue salmeterol     Hypothyroidism  Continue levothyroxine     Tobacco Use  Using nicotine patch and PRN gum     Resting tremors of upper extremities  No acute " issues      Asymptomatic COVID-19 testing negative on 12/6/2020 [done for discharge planning]     DVT Prophylaxis: Pneumatic Compression Devices    Code Status: Full Code      Disposition Plan     Expected discharge: when placement available. Needs to be placed in [place] state run facility due to agitation issues per social work     Felisa Fitch MD   Page [phone](7AM-6PM)                     Physical Exam:      Blood pressure 115/73, pulse 69, temperature 95.2  F (35.1  C), temperature source Oral, resp. rate 16, height 1.829 m (6'), weight 82.6 kg (182 lb), SpO2 98 %.  Vitals:    09/09/20 1754 09/25/20 0632 12/11/20 0700   Weight: 70 kg (154 lb 5.2 oz) 71 kg (156 lb 8 oz) 82.6 kg (182 lb)     Vital Signs with Ranges  Temp:  [95.2  F (35.1  C)] 95.2  F (35.1  C)  Pulse:  [69] 69  Resp:  [16] 16  BP: (115)/(73) 115/73  SpO2:  [98 %] 98 %  I/O's Last 24 hours  No intake/output data recorded.     O/E:    Alert, awake and oriented to self only; resting comfortably in no apparent distress; calm  Lung b/l clear  CVS: regular rate and rhythm  No edema            Medications:          aspirin  81 mg Oral Daily     atorvastatin  80 mg Oral At Bedtime     benztropine  0.25 mg Oral BID     docusate sodium  100 mg Oral BID     haloperidol  10 mg Oral TID     levothyroxine  88 mcg Oral Daily     memantine  5 mg Oral Daily     nicotine  1 patch Transdermal Daily     nicotine   Transdermal TID     polyethylene glycol  17 g Oral Daily     salmeterol  1 puff Inhalation BID     venlafaxine  75 mg Oral Daily with breakfast     PRN Meds: acetaminophen, albuterol, alum & mag hydroxide-simethicone, haloperidol, LORazepam, melatonin, naloxone **OR** naloxone **OR** naloxone **OR** naloxone, nicotine, OLANZapine, OLANZapine zydis, ondansetron **OR** ondansetron         Data:      All new lab and imaging data was reviewed.   Recent Labs   Lab Test 12/16/20  0729 12/03/20  0833 09/28/20  0854 06/02/14 2018 06/02/14 2018   WBC 5.7 5.4 5.5    < > 6.5   HGB 14.2 16.4 15.9   < > 14.2   MCV 93 92 93   < > 100    179 189   < > 122*   INR  --   --   --   --  0.99    < > = values in this interval not displayed.      Recent Labs   Lab Test 12/16/20  0729 12/03/20  0833 11/02/20  1019    138 142   POTASSIUM 4.0 4.0 4.2   CHLORIDE 110* 109 111*   CO2 29 24 24   BUN 14 18 19   CR 0.68 0.74 0.71   ANIONGAP 3 5 7   LESLEE 8.6 8.8 9.0   * 95 140*     No lab results found.    Invalid input(s): TROP, TROPONINIES

## 2020-12-30 NOTE — PLAN OF CARE
[id number]: Uneventful shift.  Flat affect.  Calm, redirectable.  Regular diet, good appetite, scheduled meds given. L Deltoid nicotene patch in place. Door alarm on. Plan: Discharge to state facility once room available.

## 2020-12-30 NOTE — PROGRESS NOTES
Care Management Follow Up    Length of Stay (days): 101    Expected Discharge Date: 01/05/21([facility] )     Concerns to be Addressed: discharge planning     Patient plan of care discussed at interdisciplinary rounds: Yes    Anticipated Discharge Disposition: Group Home     Anticipated Discharge Services:    Anticipated Discharge DME: None    Patient/family educated on Medicare website which has current facility and service quality ratings:    Education Provided on the Discharge Plan:    Patient/Family in Agreement with the Plan:      Referrals Placed by CM/SW: Post Acute Facilities  Private pay costs discussed: Not applicable    Additional Information:  Awaiting admission into a state group home.  He is on the wait list.      ASIM CastilloSW

## 2020-12-30 NOTE — PLAN OF CARE
"Uneventful shift. VSS on room air. Denies pain. Regular diet, good intake. PRN haldol x1 for agitation. Patient frustrated after a \"telephone call with brother\" - RN unable to confirm if they actually spoke. PRN effective, patient cooperative.   "

## 2020-12-31 PROCEDURE — 250N000013 HC RX MED GY IP 250 OP 250 PS 637: Performed by: INTERNAL MEDICINE

## 2020-12-31 PROCEDURE — 250N000013 HC RX MED GY IP 250 OP 250 PS 637: Performed by: HOSPITALIST

## 2020-12-31 PROCEDURE — 120N000001 HC R&B MED SURG/OB

## 2020-12-31 PROCEDURE — 250N000013 HC RX MED GY IP 250 OP 250 PS 637: Performed by: PSYCHIATRY & NEUROLOGY

## 2020-12-31 PROCEDURE — 99231 SBSQ HOSP IP/OBS SF/LOW 25: CPT | Performed by: INTERNAL MEDICINE

## 2020-12-31 RX ADMIN — LEVOTHYROXINE SODIUM 88 MCG: 88 TABLET ORAL at 09:42

## 2020-12-31 RX ADMIN — VENLAFAXINE HYDROCHLORIDE 75 MG: 75 CAPSULE, EXTENDED RELEASE ORAL at 09:41

## 2020-12-31 RX ADMIN — DOCUSATE SODIUM 100 MG: 100 CAPSULE, LIQUID FILLED ORAL at 09:41

## 2020-12-31 RX ADMIN — HALOPERIDOL 10 MG: 5 TABLET ORAL at 21:03

## 2020-12-31 RX ADMIN — Medication 0.25 MG: at 09:42

## 2020-12-31 RX ADMIN — MELATONIN TAB 3 MG 3 MG: 3 TAB at 00:50

## 2020-12-31 RX ADMIN — OLANZAPINE 10 MG: 5 TABLET, ORALLY DISINTEGRATING ORAL at 23:51

## 2020-12-31 RX ADMIN — Medication 0.25 MG: at 00:50

## 2020-12-31 RX ADMIN — MEMANTINE 5 MG: 5 TABLET ORAL at 09:41

## 2020-12-31 RX ADMIN — ASPIRIN 81 MG: 81 TABLET, COATED ORAL at 09:41

## 2020-12-31 RX ADMIN — SALMETEROL XINAFOATE 1 PUFF: 50 POWDER, METERED ORAL; RESPIRATORY (INHALATION) at 09:42

## 2020-12-31 RX ADMIN — HALOPERIDOL 5 MG: 5 TABLET ORAL at 11:17

## 2020-12-31 RX ADMIN — DOCUSATE SODIUM 100 MG: 100 CAPSULE, LIQUID FILLED ORAL at 21:03

## 2020-12-31 RX ADMIN — HALOPERIDOL 10 MG: 5 TABLET ORAL at 00:50

## 2020-12-31 RX ADMIN — ATORVASTATIN CALCIUM 80 MG: 40 TABLET, FILM COATED ORAL at 21:03

## 2020-12-31 RX ADMIN — Medication 0.25 MG: at 21:03

## 2020-12-31 RX ADMIN — HALOPERIDOL 10 MG: 5 TABLET ORAL at 14:15

## 2020-12-31 RX ADMIN — DOCUSATE SODIUM 100 MG: 100 CAPSULE, LIQUID FILLED ORAL at 00:50

## 2020-12-31 RX ADMIN — HALOPERIDOL 10 MG: 5 TABLET ORAL at 09:41

## 2020-12-31 ASSESSMENT — ACTIVITIES OF DAILY LIVING (ADL)
ADLS_ACUITY_SCORE: 15

## 2020-12-31 NOTE — PLAN OF CARE
Patient alert to self, behavior appropriate. Up independent in room. Door alarm on room. Patient exits the room occasionally to request drinks. On a court hold, waiting for state bed placement.

## 2020-12-31 NOTE — PROGRESS NOTES
"[facility]  Hospitalist Progress Note        Felisa Fitch MD   12/31/2020        Interval History:      - no acute issues . Resting comfortably in bed          Assessment and Plan:        oJhn Juárez is a 54 year old male Group Home resident with PMHx of schizophrenia, hx of TBI, alcohol use disorder, COPD, hyperlipidemia and hypothyroidism who was admitted on 9/9/2020 for evaluation of aggressive behavior at his group home.     He has been evaluated by psychiatry, his medical condition remains stable, though his group home is now unwilling to take him back and thus hospitalization has been prolonged awaiting new placement.      Neurocognitive disorder dt hx of TBI and alcohol use disorder  Schizophrenia  Under commitment  - a Group Home resident PTA and was brought to hospital for evaluation of aggressive behaviors. Group home now unwilling to take him back. Has had numerous CODE 21s called this stay. Seen by psych, meds adjusted. Is currently under civil commitment and court hold through [facility] until 7/31/21.  - psych evaluated on several occasions this admission; previously rec kennedy psych; last evaluated on 12/18 and suggested no need for kennedy psych and to continue current medications without change  - currently on scheduled Haldol 10 mg po TID, namenda, effexor; also on haldol, ativan and zyprexa prn; does have intermittent agitation  - last EKG on 12/18, qTc 428  - Patient has been undergoing paper work for funding and placement  -  following for disposition \"Since patient's admission he has been denied by numerous facilities due to his risk of elopement and his physicial aggression.   Plan:  Await placement at a [facility] run facility.\"    Hyperlipidemia  Continue statin and aspirin.      COPD  Not O2 dependent.  - Continue salmeterol     Hypothyroidism  Continue levothyroxine     Tobacco Use  Using nicotine patch and PRN gum     Resting tremors of upper extremities  No acute " issues      Asymptomatic COVID-19 testing negative on 12/6/2020 [done for discharge planning]     DVT Prophylaxis: Pneumatic Compression Devices    Code Status: Full Code      Disposition Plan     Expected discharge: when placement available. Needs to be placed in [place] state run facility due to agitation issues per social work     Felisa Fitch MD   Page [phone](7AM-6PM)                     Physical Exam:      Blood pressure 112/79, pulse 73, temperature 96  F (35.6  C), temperature source Oral, resp. rate 16, height 1.829 m (6'), weight 82.6 kg (182 lb), SpO2 94 %.  Vitals:    09/09/20 1754 09/25/20 0632 12/11/20 0700   Weight: 70 kg (154 lb 5.2 oz) 71 kg (156 lb 8 oz) 82.6 kg (182 lb)     Vital Signs with Ranges  Temp:  [96  F (35.6  C)] 96  F (35.6  C)  Pulse:  [73] 73  Resp:  [16] 16  BP: (112)/(79) 112/79  SpO2:  [94 %] 94 %  I/O's Last 24 hours  I/O last 3 completed shifts:  In: 240 [P.O.:240]  Out: -      O/E:    Alert, awake and oriented to self only; resting comfortably in no apparent distress; calm  Lung b/l clear  CVS: regular rate and rhythm  No edema            Medications:          aspirin  81 mg Oral Daily     atorvastatin  80 mg Oral At Bedtime     benztropine  0.25 mg Oral BID     docusate sodium  100 mg Oral BID     haloperidol  10 mg Oral TID     levothyroxine  88 mcg Oral Daily     memantine  5 mg Oral Daily     nicotine  1 patch Transdermal Daily     nicotine   Transdermal TID     polyethylene glycol  17 g Oral Daily     salmeterol  1 puff Inhalation BID     venlafaxine  75 mg Oral Daily with breakfast     PRN Meds: acetaminophen, albuterol, alum & mag hydroxide-simethicone, haloperidol, LORazepam, melatonin, naloxone **OR** naloxone **OR** naloxone **OR** naloxone, nicotine, OLANZapine, OLANZapine zydis, ondansetron **OR** ondansetron         Data:      All new lab and imaging data was reviewed.   Recent Labs   Lab Test 12/16/20  0729 12/03/20  0833 09/28/20  0854 06/02/14 2018  06/02/14 2018   WBC 5.7 5.4 5.5   < > 6.5   HGB 14.2 16.4 15.9   < > 14.2   MCV 93 92 93   < > 100    179 189   < > 122*   INR  --   --   --   --  0.99    < > = values in this interval not displayed.      Recent Labs   Lab Test 12/16/20  0729 12/03/20  0833 11/02/20  1019    138 142   POTASSIUM 4.0 4.0 4.2   CHLORIDE 110* 109 111*   CO2 29 24 24   BUN 14 18 19   CR 0.68 0.74 0.71   ANIONGAP 3 5 7   LESLEE 8.6 8.8 9.0   * 95 140*     No lab results found.    Invalid input(s): TROP, TROPONINIES

## 2020-12-31 NOTE — PLAN OF CARE
"Alert to self and situation. Insight poor, judgement poor. Pt states \" I want to talk to a , [facility] has helped me before\" \"I have homes all over [place] I can go to\" I don't need a group home\". Pt redirectable. Up independently in the room. Placement pending, needs a state bed. Sleeping at 2100 and 2200.     "

## 2021-01-01 PROCEDURE — 250N000013 HC RX MED GY IP 250 OP 250 PS 637: Performed by: INTERNAL MEDICINE

## 2021-01-01 PROCEDURE — 250N000013 HC RX MED GY IP 250 OP 250 PS 637: Performed by: HOSPITALIST

## 2021-01-01 PROCEDURE — 99233 SBSQ HOSP IP/OBS HIGH 50: CPT | Performed by: INTERNAL MEDICINE

## 2021-01-01 PROCEDURE — 120N000001 HC R&B MED SURG/OB

## 2021-01-01 PROCEDURE — 250N000013 HC RX MED GY IP 250 OP 250 PS 637: Performed by: PSYCHIATRY & NEUROLOGY

## 2021-01-01 PROCEDURE — 250N000013 HC RX MED GY IP 250 OP 250 PS 637: Performed by: NURSE PRACTITIONER

## 2021-01-01 RX ORDER — NICOTINE 21 MG/24HR
1 PATCH, TRANSDERMAL 24 HOURS TRANSDERMAL DAILY
Status: DISPENSED | OUTPATIENT
Start: 2021-01-01 | End: 2021-01-08

## 2021-01-01 RX ADMIN — LORAZEPAM 1 MG: 1 TABLET ORAL at 19:07

## 2021-01-01 RX ADMIN — LEVOTHYROXINE SODIUM 88 MCG: 88 TABLET ORAL at 09:09

## 2021-01-01 RX ADMIN — SALMETEROL XINAFOATE 1 PUFF: 50 POWDER, METERED ORAL; RESPIRATORY (INHALATION) at 09:13

## 2021-01-01 RX ADMIN — DOCUSATE SODIUM 100 MG: 100 CAPSULE, LIQUID FILLED ORAL at 09:09

## 2021-01-01 RX ADMIN — HALOPERIDOL 10 MG: 5 TABLET ORAL at 21:44

## 2021-01-01 RX ADMIN — DOCUSATE SODIUM 100 MG: 100 CAPSULE, LIQUID FILLED ORAL at 21:44

## 2021-01-01 RX ADMIN — Medication 0.25 MG: at 21:44

## 2021-01-01 RX ADMIN — ASPIRIN 81 MG: 81 TABLET, COATED ORAL at 09:09

## 2021-01-01 RX ADMIN — NICOTINE 1 PATCH: 14 PATCH, EXTENDED RELEASE TRANSDERMAL at 16:09

## 2021-01-01 RX ADMIN — SALMETEROL XINAFOATE 1 PUFF: 50 POWDER, METERED ORAL; RESPIRATORY (INHALATION) at 21:46

## 2021-01-01 RX ADMIN — NICOTINE 1 PATCH: 21 PATCH, EXTENDED RELEASE TRANSDERMAL at 09:12

## 2021-01-01 RX ADMIN — VENLAFAXINE HYDROCHLORIDE 75 MG: 75 CAPSULE, EXTENDED RELEASE ORAL at 09:09

## 2021-01-01 RX ADMIN — HALOPERIDOL 10 MG: 5 TABLET ORAL at 15:55

## 2021-01-01 RX ADMIN — Medication 0.25 MG: at 09:09

## 2021-01-01 RX ADMIN — MEMANTINE 5 MG: 5 TABLET ORAL at 09:09

## 2021-01-01 RX ADMIN — HALOPERIDOL 10 MG: 5 TABLET ORAL at 09:09

## 2021-01-01 RX ADMIN — ATORVASTATIN CALCIUM 80 MG: 40 TABLET, FILM COATED ORAL at 21:44

## 2021-01-01 ASSESSMENT — ACTIVITIES OF DAILY LIVING (ADL)
ADLS_ACUITY_SCORE: 15

## 2021-01-01 NOTE — PROGRESS NOTES
"[facility]  Hospitalist Progress Note        Felisa Fitch MD   01/01/2021        Interval History:      - no acute issues . Resting comfortably in bed . Gotten PRN zyprexa overnight          Assessment and Plan:        John Juárez is a 54 year old male Group Home resident with PMHx of schizophrenia, hx of TBI, alcohol use disorder, COPD, hyperlipidemia and hypothyroidism who was admitted on 9/9/2020 for evaluation of aggressive behavior at his group home.     He has been evaluated by psychiatry, his medical condition remains stable, though his group home is now unwilling to take him back and thus hospitalization has been prolonged awaiting new placement.      Neurocognitive disorder dt hx of TBI and alcohol use disorder  Schizophrenia  Under commitment  - a Group Home resident PTA and was brought to hospital for evaluation of aggressive behaviors. Group home now unwilling to take him back. Has had numerous CODE 21s called this stay. Seen by psych, meds adjusted. Is currently under civil commitment and court hold through [facility] until 7/31/21.  - psych evaluated on several occasions this admission; previously rec kennedy psych; last evaluated on 12/18 and suggested no need for kennedy psych and to continue current medications without change  - currently on scheduled Haldol 10 mg po TID, namenda, effexor; also on haldol, ativan and zyprexa prn; does have intermittent agitation  - last EKG on 12/18, qTc 428  - Patient has been undergoing paper work for funding and placement  -  following for disposition \"Since patient's admission he has been denied by numerous facilities due to his risk of elopement and his physicial aggression.   Plan:  Await placement at a [facility] run facility.\"    Hyperlipidemia  Continue statin and aspirin.      COPD  Not O2 dependent.  - Continue salmeterol     Hypothyroidism  Continue levothyroxine     Tobacco Use  Using nicotine patch and PRN gum     Resting tremors " of upper extremities  No acute issues      Asymptomatic COVID-19 testing negative on 12/6/2020 [done for discharge planning]     DVT Prophylaxis: Pneumatic Compression Devices    Code Status: Full Code      Disposition Plan     Expected discharge: when placement available. Needs to be placed in [facility] run facility due to agitation issues per social work     Felisa Fitch MD   Page [phone](7AM-6PM)                     Physical Exam:      Blood pressure 100/62, pulse 76, temperature 95.8  F (35.4  C), temperature source Oral, resp. rate 14, height 1.829 m (6'), weight 82.6 kg (182 lb), SpO2 98 %.  Vitals:    09/09/20 1754 09/25/20 0632 12/11/20 0700   Weight: 70 kg (154 lb 5.2 oz) 71 kg (156 lb 8 oz) 82.6 kg (182 lb)     Vital Signs with Ranges  Temp:  [95.8  F (35.4  C)] 95.8  F (35.4  C)  Pulse:  [76] 76  Resp:  [14] 14  BP: (100)/(62) 100/62  SpO2:  [98 %] 98 %  I/O's Last 24 hours  No intake/output data recorded.     O/E:    Sleeping comfortably   Lung b/l clear  CVS: regular rate and rhythm  No edema            Medications:          aspirin  81 mg Oral Daily     atorvastatin  80 mg Oral At Bedtime     benztropine  0.25 mg Oral BID     docusate sodium  100 mg Oral BID     haloperidol  10 mg Oral TID     levothyroxine  88 mcg Oral Daily     memantine  5 mg Oral Daily     nicotine  1 patch Transdermal Daily     nicotine   Transdermal Q8H     nicotine   Transdermal TID     polyethylene glycol  17 g Oral Daily     salmeterol  1 puff Inhalation BID     venlafaxine  75 mg Oral Daily with breakfast     PRN Meds: acetaminophen, albuterol, alum & mag hydroxide-simethicone, haloperidol, LORazepam, melatonin, naloxone **OR** naloxone **OR** naloxone **OR** naloxone, nicotine, OLANZapine, OLANZapine zydis, ondansetron **OR** ondansetron         Data:      All new lab and imaging data was reviewed.   Recent Labs   Lab Test 12/16/20  0729 12/03/20  0833 09/28/20  0854 06/02/14 2018 06/02/14 2018   WBC 5.7 5.4 5.5   < >  6.5   HGB 14.2 16.4 15.9   < > 14.2   MCV 93 92 93   < > 100    179 189   < > 122*   INR  --   --   --   --  0.99    < > = values in this interval not displayed.      Recent Labs   Lab Test 12/16/20  0729 12/03/20  0833 11/02/20  1019    138 142   POTASSIUM 4.0 4.0 4.2   CHLORIDE 110* 109 111*   CO2 29 24 24   BUN 14 18 19   CR 0.68 0.74 0.71   ANIONGAP 3 5 7   LESLEE 8.6 8.8 9.0   * 95 140*     No lab results found.    Invalid input(s): TROP, TROPONINIES

## 2021-01-01 NOTE — PLAN OF CARE
Alert to self only. Door alarm d/t flight risk. Increased agitation last night, required 10mg zyprexa. Remainder of shift uneventful. Independent in room. Tolerating regular diet. No IV access. SW working on placement.

## 2021-01-01 NOTE — PLAN OF CARE
Uneventful shift. Alert to self only. Confusion and agitation fluctuates, given PRN PO haldol x1. Independent in room. Shower done today. SW working on placement.

## 2021-01-02 LAB — LACTATE BLD-SCNC: 1.2 MMOL/L (ref 0.7–2)

## 2021-01-02 PROCEDURE — 36415 COLL VENOUS BLD VENIPUNCTURE: CPT | Performed by: STUDENT IN AN ORGANIZED HEALTH CARE EDUCATION/TRAINING PROGRAM

## 2021-01-02 PROCEDURE — 250N000013 HC RX MED GY IP 250 OP 250 PS 637: Performed by: HOSPITALIST

## 2021-01-02 PROCEDURE — 120N000001 HC R&B MED SURG/OB

## 2021-01-02 PROCEDURE — 83605 ASSAY OF LACTIC ACID: CPT | Performed by: STUDENT IN AN ORGANIZED HEALTH CARE EDUCATION/TRAINING PROGRAM

## 2021-01-02 PROCEDURE — 250N000013 HC RX MED GY IP 250 OP 250 PS 637: Performed by: PSYCHIATRY & NEUROLOGY

## 2021-01-02 PROCEDURE — 99231 SBSQ HOSP IP/OBS SF/LOW 25: CPT | Performed by: STUDENT IN AN ORGANIZED HEALTH CARE EDUCATION/TRAINING PROGRAM

## 2021-01-02 PROCEDURE — 250N000013 HC RX MED GY IP 250 OP 250 PS 637: Performed by: INTERNAL MEDICINE

## 2021-01-02 RX ADMIN — DOCUSATE SODIUM 100 MG: 100 CAPSULE, LIQUID FILLED ORAL at 20:05

## 2021-01-02 RX ADMIN — MEMANTINE 5 MG: 5 TABLET ORAL at 08:43

## 2021-01-02 RX ADMIN — VENLAFAXINE HYDROCHLORIDE 75 MG: 75 CAPSULE, EXTENDED RELEASE ORAL at 08:43

## 2021-01-02 RX ADMIN — HALOPERIDOL 10 MG: 5 TABLET ORAL at 15:09

## 2021-01-02 RX ADMIN — Medication 0.25 MG: at 20:05

## 2021-01-02 RX ADMIN — ATORVASTATIN CALCIUM 80 MG: 40 TABLET, FILM COATED ORAL at 20:05

## 2021-01-02 RX ADMIN — LEVOTHYROXINE SODIUM 88 MCG: 88 TABLET ORAL at 08:43

## 2021-01-02 RX ADMIN — HALOPERIDOL 10 MG: 5 TABLET ORAL at 08:43

## 2021-01-02 RX ADMIN — DOCUSATE SODIUM 100 MG: 100 CAPSULE, LIQUID FILLED ORAL at 08:43

## 2021-01-02 RX ADMIN — ASPIRIN 81 MG: 81 TABLET, COATED ORAL at 08:43

## 2021-01-02 RX ADMIN — Medication 0.25 MG: at 08:43

## 2021-01-02 RX ADMIN — HALOPERIDOL 10 MG: 5 TABLET ORAL at 20:05

## 2021-01-02 RX ADMIN — SALMETEROL XINAFOATE 1 PUFF: 50 POWDER, METERED ORAL; RESPIRATORY (INHALATION) at 20:07

## 2021-01-02 ASSESSMENT — ACTIVITIES OF DAILY LIVING (ADL)
ADLS_ACUITY_SCORE: 15

## 2021-01-02 NOTE — PROGRESS NOTES
"[facility]  Hospitalist Progress Note        Rosamaria Angeles MD   01/02/2021        Interval History:      - no acute issues . Resting comfortably in bed . Wants to go home. States he has several mansions all over the [place] that he could go to.          Assessment and Plan:        John Juárez is a 54 year old male Group Home resident with PMHx of schizophrenia, hx of TBI, alcohol use disorder, COPD, hyperlipidemia and hypothyroidism who was admitted on 9/9/2020 for evaluation of aggressive behavior at his group home.     He has been evaluated by psychiatry, his medical condition remains stable, though his group home is now unwilling to take him back and thus hospitalization has been prolonged awaiting new placement.      Neurocognitive disorder dt hx of TBI and alcohol use disorder  Schizophrenia  Under commitment  - a Group Home resident PTA and was brought to hospital for evaluation of aggressive behaviors. Group home now unwilling to take him back. Has had numerous CODE 21s called this stay. Seen by psych, meds adjusted. Is currently under civil commitment and court hold through [facility] until 7/31/21.  - psych evaluated on several occasions this admission; previously rec kennedy psych; last evaluated on 12/18 and suggested no need for kennedy psych and to continue current medications without change  - currently on scheduled Haldol 10 mg po TID, namenda, effexor; also on haldol, ativan and zyprexa prn; does have intermittent agitation  - last EKG on 12/18, qTc 428  - Patient has been undergoing paper work for funding and placement  -  following for disposition \"Since patient's admission he has been denied by numerous facilities due to his risk of elopement and his physicial aggression.   Plan:  Await placement at a [facility] run facility.\"    Hyperlipidemia  Continue statin and aspirin.      COPD  Not O2 dependent.  - Continue salmeterol     Hypothyroidism  Continue " levothyroxine     Tobacco Use  Using nicotine patch and PRN gum     Resting tremors of upper extremities  No acute issues      Asymptomatic COVID-19 testing negative on 12/6/2020 [done for discharge planning]     DVT Prophylaxis: Pneumatic Compression Devices    Code Status: Full Code      Disposition Plan     Expected discharge: when placement available. Needs to be placed in [facility] run facility due to agitation issues per social work                        Physical Exam:      Blood pressure 113/52, pulse 96, temperature 97.1  F (36.2  C), temperature source Axillary, resp. rate 18, height 1.829 m (6'), weight 82.6 kg (182 lb), SpO2 97 %.  Vitals:    09/09/20 1754 09/25/20 0632 12/11/20 0700   Weight: 70 kg (154 lb 5.2 oz) 71 kg (156 lb 8 oz) 82.6 kg (182 lb)     Vital Signs with Ranges  Temp:  [95.9  F (35.5  C)-97.1  F (36.2  C)] 97.1  F (36.2  C)  Pulse:  [91-96] 96  Resp:  [14-18] 18  BP: ()/(52-60) 113/52  SpO2:  [97 %] 97 %  I/O's Last 24 hours  I/O last 3 completed shifts:  In: 960 [P.O.:960]  Out: -        Exam:  Constitutional: healthy, alert and no distress  Head: Normocephalic. No masses, lesions, tenderness or abnormalities  Cardiovascular: Regular rate, stable vital signs  Respiratory: No increased work of breathing, speaking in full sentences   Musculoskeletal: extremities normal- no gross deformities noted, gait normal and normal muscle tone  Skin: no suspicious lesions or rashes  Neurologic: Gait normal.   Psychiatric: +delusions, pleasant           Medications:          aspirin  81 mg Oral Daily     atorvastatin  80 mg Oral At Bedtime     benztropine  0.25 mg Oral BID     docusate sodium  100 mg Oral BID     haloperidol  10 mg Oral TID     levothyroxine  88 mcg Oral Daily     memantine  5 mg Oral Daily     nicotine  1 patch Transdermal Daily     [START ON 1/8/2021] nicotine  1 patch Transdermal Daily     nicotine   Transdermal Q8H     [START ON 1/8/2021] nicotine   Transdermal Q8H      polyethylene glycol  17 g Oral Daily     salmeterol  1 puff Inhalation BID     venlafaxine  75 mg Oral Daily with breakfast     PRN Meds: acetaminophen, albuterol, alum & mag hydroxide-simethicone, haloperidol, LORazepam, melatonin, naloxone **OR** naloxone **OR** naloxone **OR** naloxone, nicotine, OLANZapine, OLANZapine zydis, ondansetron **OR** ondansetron         Data:      All new lab and imaging data was reviewed.   Recent Labs   Lab Test 12/16/20 0729 12/03/20 0833 09/28/20  0854 06/02/14 2018 06/02/14 2018   WBC 5.7 5.4 5.5   < > 6.5   HGB 14.2 16.4 15.9   < > 14.2   MCV 93 92 93   < > 100    179 189   < > 122*   INR  --   --   --   --  0.99    < > = values in this interval not displayed.      Recent Labs   Lab Test 12/16/20 0729 12/03/20 0833 11/02/20  1019    138 142   POTASSIUM 4.0 4.0 4.2   CHLORIDE 110* 109 111*   CO2 29 24 24   BUN 14 18 19   CR 0.68 0.74 0.71   ANIONGAP 3 5 7   LESLEE 8.6 8.8 9.0   * 95 140*     No lab results found.    Invalid input(s): TROP, TROPONINIES

## 2021-01-02 NOTE — PLAN OF CARE
Oriented to self only, alert and mostly calm this evening, PRN Ativan x1. Door alarm on. Ambulating independently within room.  Excellent appetite, meals ordered by staff. No IV access. Awaiting placement.  Nursing will continue to monitor.

## 2021-01-02 NOTE — PLAN OF CARE
Oriented to self only, alert and mostly calm,slept all night, door alarm on. Ambulating independently within room. Tolerating regular diet. No IV access. Awaiting placement, SW following.

## 2021-01-03 PROCEDURE — 120N000001 HC R&B MED SURG/OB

## 2021-01-03 PROCEDURE — 250N000013 HC RX MED GY IP 250 OP 250 PS 637: Performed by: INTERNAL MEDICINE

## 2021-01-03 PROCEDURE — 250N000013 HC RX MED GY IP 250 OP 250 PS 637: Performed by: PSYCHIATRY & NEUROLOGY

## 2021-01-03 PROCEDURE — 250N000013 HC RX MED GY IP 250 OP 250 PS 637: Performed by: HOSPITALIST

## 2021-01-03 RX ADMIN — Medication 0.25 MG: at 20:51

## 2021-01-03 RX ADMIN — OLANZAPINE 5 MG: 5 TABLET, ORALLY DISINTEGRATING ORAL at 19:50

## 2021-01-03 RX ADMIN — DOCUSATE SODIUM 100 MG: 100 CAPSULE, LIQUID FILLED ORAL at 20:51

## 2021-01-03 RX ADMIN — SALMETEROL XINAFOATE 1 PUFF: 50 POWDER, METERED ORAL; RESPIRATORY (INHALATION) at 09:16

## 2021-01-03 RX ADMIN — HALOPERIDOL 10 MG: 5 TABLET ORAL at 09:15

## 2021-01-03 RX ADMIN — DOCUSATE SODIUM 100 MG: 100 CAPSULE, LIQUID FILLED ORAL at 09:15

## 2021-01-03 RX ADMIN — Medication 0.25 MG: at 09:15

## 2021-01-03 RX ADMIN — MEMANTINE 5 MG: 5 TABLET ORAL at 09:16

## 2021-01-03 RX ADMIN — ATORVASTATIN CALCIUM 80 MG: 40 TABLET, FILM COATED ORAL at 20:51

## 2021-01-03 RX ADMIN — LEVOTHYROXINE SODIUM 88 MCG: 88 TABLET ORAL at 09:16

## 2021-01-03 RX ADMIN — HALOPERIDOL 10 MG: 5 TABLET ORAL at 20:51

## 2021-01-03 RX ADMIN — VENLAFAXINE HYDROCHLORIDE 75 MG: 75 CAPSULE, EXTENDED RELEASE ORAL at 09:16

## 2021-01-03 RX ADMIN — SALMETEROL XINAFOATE 1 PUFF: 50 POWDER, METERED ORAL; RESPIRATORY (INHALATION) at 20:51

## 2021-01-03 RX ADMIN — HALOPERIDOL 10 MG: 5 TABLET ORAL at 15:47

## 2021-01-03 RX ADMIN — ASPIRIN 81 MG: 81 TABLET, COATED ORAL at 09:16

## 2021-01-03 ASSESSMENT — ACTIVITIES OF DAILY LIVING (ADL)
ADLS_ACUITY_SCORE: 15

## 2021-01-03 NOTE — PLAN OF CARE
Oriented to self. Calm/cooperative this shift. Denies pain. Refused nicotine patch. Independent in room. Discharge pending.

## 2021-01-03 NOTE — PROGRESS NOTES
Brief Hospitalist note.     Pt seen sleeping comfortably in room. There are no changes to the plan. Awaiting placement.     No charge for today's note.     Rosamaria Angeles MD

## 2021-01-03 NOTE — PROGRESS NOTES
Oriented to self. Calm and pleasant this shift. Up IND in room, door alarm in place. Regular diet. No IV access. Discharge pending placement.

## 2021-01-04 PROCEDURE — 120N000001 HC R&B MED SURG/OB

## 2021-01-04 PROCEDURE — 250N000013 HC RX MED GY IP 250 OP 250 PS 637: Performed by: HOSPITALIST

## 2021-01-04 PROCEDURE — 250N000013 HC RX MED GY IP 250 OP 250 PS 637: Performed by: INTERNAL MEDICINE

## 2021-01-04 PROCEDURE — 250N000013 HC RX MED GY IP 250 OP 250 PS 637: Performed by: PSYCHIATRY & NEUROLOGY

## 2021-01-04 PROCEDURE — 99231 SBSQ HOSP IP/OBS SF/LOW 25: CPT | Performed by: STUDENT IN AN ORGANIZED HEALTH CARE EDUCATION/TRAINING PROGRAM

## 2021-01-04 RX ADMIN — Medication 0.25 MG: at 21:04

## 2021-01-04 RX ADMIN — ASPIRIN 81 MG: 81 TABLET, COATED ORAL at 10:06

## 2021-01-04 RX ADMIN — MEMANTINE 5 MG: 5 TABLET ORAL at 10:06

## 2021-01-04 RX ADMIN — HALOPERIDOL 10 MG: 5 TABLET ORAL at 16:49

## 2021-01-04 RX ADMIN — LEVOTHYROXINE SODIUM 88 MCG: 88 TABLET ORAL at 10:07

## 2021-01-04 RX ADMIN — HALOPERIDOL 10 MG: 5 TABLET ORAL at 10:07

## 2021-01-04 RX ADMIN — HALOPERIDOL 10 MG: 5 TABLET ORAL at 21:05

## 2021-01-04 RX ADMIN — DOCUSATE SODIUM 100 MG: 100 CAPSULE, LIQUID FILLED ORAL at 10:07

## 2021-01-04 RX ADMIN — SALMETEROL XINAFOATE 1 PUFF: 50 POWDER, METERED ORAL; RESPIRATORY (INHALATION) at 21:07

## 2021-01-04 RX ADMIN — SALMETEROL XINAFOATE 1 PUFF: 50 POWDER, METERED ORAL; RESPIRATORY (INHALATION) at 10:06

## 2021-01-04 RX ADMIN — VENLAFAXINE HYDROCHLORIDE 75 MG: 75 CAPSULE, EXTENDED RELEASE ORAL at 10:06

## 2021-01-04 RX ADMIN — ATORVASTATIN CALCIUM 80 MG: 40 TABLET, FILM COATED ORAL at 21:05

## 2021-01-04 RX ADMIN — DOCUSATE SODIUM 100 MG: 100 CAPSULE, LIQUID FILLED ORAL at 21:05

## 2021-01-04 RX ADMIN — LORAZEPAM 1 MG: 1 TABLET ORAL at 00:39

## 2021-01-04 RX ADMIN — Medication 0.25 MG: at 10:07

## 2021-01-04 ASSESSMENT — ACTIVITIES OF DAILY LIVING (ADL)
ADLS_ACUITY_SCORE: 15

## 2021-01-04 NOTE — PLAN OF CARE
Uneventful day. VSS. Denied pain. No complaints, redirectable. Good appetite. Discharge pending placement at [facility].

## 2021-01-04 NOTE — PROGRESS NOTES
"[facility]  Hospitalist Progress Note        Rosamaria Angeles MD   01/04/2021        Interval History:      - no acute issues . Resting comfortably in bed. Intermittently states that he is is going to get up and leave. So far has been redirectable. Intermittently requires zyprexa and ativan for agitation, but these have been used sparingly.          Assessment and Plan:        John Juárez is a 54 year old male Group Home resident with PMHx of schizophrenia, hx of TBI, alcohol use disorder, COPD, hyperlipidemia and hypothyroidism who was admitted on 9/9/2020 for evaluation of aggressive behavior at his group home.     He has been evaluated by psychiatry, his medical condition remains stable, though his group home is now unwilling to take him back and thus hospitalization has been prolonged awaiting new placement.      Neurocognitive disorder dt hx of TBI and alcohol use disorder  Schizophrenia  Under commitment  - a Group Home resident PTA and was brought to hospital for evaluation of aggressive behaviors. Group home now unwilling to take him back. Has had numerous CODE 21s called this stay. Seen by psych, meds vita. Is currently under civil commitment and court hold through [facility] until 7/31/21.  - psych evaluated on several occasions this admission; previously rec kennedy psych; last evaluated on 12/18 and suggested no need for kennedy psych and to continue current medications without change  - currently on scheduled Haldol 10 mg po TID, namenda, effexor; also on haldol, ativan and zyprexa prn; does have intermittent agitation  - last EKG on 12/18, qTc 428  - Patient has been undergoing paper work for funding and placement  -  following for disposition \"Since patient's admission he has been denied by numerous facilities due to his risk of elopement and his physicial aggression.   Plan:  Await placement at a [facility] facility.\"    Hyperlipidemia  Continue statin and aspirin. "      COPD  Not O2 dependent.  - Continue salmeterol     Hypothyroidism  Continue levothyroxine     Tobacco Use  Using nicotine patch and PRN gum     Resting tremors of upper extremities  No acute issues      Asymptomatic COVID-19 testing negative on 12/6/2020 [done for discharge planning]     DVT Prophylaxis: Pneumatic Compression Devices    Code Status: Full Code      Disposition Plan     Expected discharge: when placement available. Needs to be placed in [facility] run facility due to agitation issues per social work                        Physical Exam:      Blood pressure 105/72, pulse 72, temperature 96.5  F (35.8  C), temperature source Oral, resp. rate 18, height 1.829 m (6'), weight 82.6 kg (182 lb), SpO2 98 %.  Vitals:    09/09/20 1754 09/25/20 0632 12/11/20 0700   Weight: 70 kg (154 lb 5.2 oz) 71 kg (156 lb 8 oz) 82.6 kg (182 lb)     Vital Signs with Ranges     I/O's Last 24 hours  I/O last 3 completed shifts:  In: 840 [P.O.:840]  Out: -        Exam:  Constitutional: healthy, alert and no distress  Head: Normocephalic. No masses, lesions, tenderness or abnormalities  Cardiovascular: Regular rate, stable vital signs  Respiratory: No increased work of breathing  Musculoskeletal: extremities normal- no gross deformities noted  Skin: no suspicious lesions or rashes  Psychiatric: +delusions, pleasant           Medications:          aspirin  81 mg Oral Daily     atorvastatin  80 mg Oral At Bedtime     benztropine  0.25 mg Oral BID     docusate sodium  100 mg Oral BID     haloperidol  10 mg Oral TID     levothyroxine  88 mcg Oral Daily     memantine  5 mg Oral Daily     nicotine  1 patch Transdermal Daily     [START ON 1/8/2021] nicotine  1 patch Transdermal Daily     nicotine   Transdermal Q8H     [START ON 1/8/2021] nicotine   Transdermal Q8H     polyethylene glycol  17 g Oral Daily     salmeterol  1 puff Inhalation BID     venlafaxine  75 mg Oral Daily with breakfast     PRN Meds: acetaminophen, albuterol, alum  & mag hydroxide-simethicone, haloperidol, LORazepam, melatonin, naloxone **OR** naloxone **OR** naloxone **OR** naloxone, nicotine, OLANZapine, OLANZapine zydis, ondansetron **OR** ondansetron         Data:      All new lab and imaging data was reviewed.   Recent Labs   Lab Test 12/16/20 0729 12/03/20 0833 09/28/20  0854 06/02/14 2018 06/02/14 2018   WBC 5.7 5.4 5.5   < > 6.5   HGB 14.2 16.4 15.9   < > 14.2   MCV 93 92 93   < > 100    179 189   < > 122*   INR  --   --   --   --  0.99    < > = values in this interval not displayed.      Recent Labs   Lab Test 12/16/20 0729 12/03/20 0833 11/02/20  1019    138 142   POTASSIUM 4.0 4.0 4.2   CHLORIDE 110* 109 111*   CO2 29 24 24   BUN 14 18 19   CR 0.68 0.74 0.71   ANIONGAP 3 5 7   LESLEE 8.6 8.8 9.0   * 95 140*     No lab results found.    Invalid input(s): TROP, TROPONINIES

## 2021-01-04 NOTE — PROGRESS NOTES
Care Management Follow Up    Length of Stay (days): 107    Expected Discharge Date: 01/13/21([facility])     Concerns to be Addressed: discharge planning     Patient plan of care discussed at interdisciplinary rounds: Yes    Anticipated Discharge Disposition: Group Home     Anticipated Discharge Services:    Anticipated Discharge DME: None    Patient/family educated on Medicare website which has current facility and service quality ratings:    Education Provided on the Discharge Plan:    Patient/Family in Agreement with the Plan:      Referrals Placed by CM/SW: Post Acute Facilities  Private pay costs discussed: Not applicable    Additional Information:  Call out to Kurtis Chapin, patient's Commitment Behavioral Case Manger [phone] to ask for an update regarding placement at a [facility], asking if they can give an estimate of when the facility will have a vacancy. This facility is for [place] residents who have been turned down by at least 10 community based facilities.   Two other [facility] hospital run facilities were reviewed with Mr Chapin and were ruled out;   1.  [facility] are only for out state residents.  2.  [facility] residents in the longterm system are given priority.      Writer has also spoken with Dr Angeles asking for psychiatry to re-consult and determine if patient can be transferred to a psychiatry unit while awaiting placement rather than remaining on a medical unit.  Dr Angeles supportive of this request and entered the order.  Please call writer with any questions at *[phone]    BETTYE Castillo

## 2021-01-04 NOTE — PROGRESS NOTES
"Oriented to self only. Up IND. Escalating behavior stating \"my sister is downstairs; I need to get my stuff and leave\", given PRN medications, effective. Continent. No PIV access. Discharge pending placement.   "

## 2021-01-04 NOTE — PLAN OF CARE
Pt is AxO to self, VSS. Denies pain. Uneventful day, redirectable. Good appetite. Discharge pending placement at [facility].

## 2021-01-05 PROCEDURE — 99232 SBSQ HOSP IP/OBS MODERATE 35: CPT | Performed by: PSYCHIATRY & NEUROLOGY

## 2021-01-05 PROCEDURE — 250N000013 HC RX MED GY IP 250 OP 250 PS 637: Performed by: HOSPITALIST

## 2021-01-05 PROCEDURE — 250N000013 HC RX MED GY IP 250 OP 250 PS 637: Performed by: INTERNAL MEDICINE

## 2021-01-05 PROCEDURE — 120N000001 HC R&B MED SURG/OB

## 2021-01-05 PROCEDURE — 250N000013 HC RX MED GY IP 250 OP 250 PS 637: Performed by: PSYCHIATRY & NEUROLOGY

## 2021-01-05 PROCEDURE — 99231 SBSQ HOSP IP/OBS SF/LOW 25: CPT | Performed by: STUDENT IN AN ORGANIZED HEALTH CARE EDUCATION/TRAINING PROGRAM

## 2021-01-05 RX ADMIN — HALOPERIDOL 10 MG: 5 TABLET ORAL at 15:41

## 2021-01-05 RX ADMIN — VENLAFAXINE HYDROCHLORIDE 75 MG: 75 CAPSULE, EXTENDED RELEASE ORAL at 10:22

## 2021-01-05 RX ADMIN — Medication 0.25 MG: at 10:22

## 2021-01-05 RX ADMIN — ACETAMINOPHEN 650 MG: 325 TABLET, FILM COATED ORAL at 10:22

## 2021-01-05 RX ADMIN — ATORVASTATIN CALCIUM 80 MG: 40 TABLET, FILM COATED ORAL at 20:25

## 2021-01-05 RX ADMIN — LEVOTHYROXINE SODIUM 88 MCG: 88 TABLET ORAL at 10:23

## 2021-01-05 RX ADMIN — SALMETEROL XINAFOATE 1 PUFF: 50 POWDER, METERED ORAL; RESPIRATORY (INHALATION) at 21:59

## 2021-01-05 RX ADMIN — LORAZEPAM 1 MG: 1 TABLET ORAL at 20:25

## 2021-01-05 RX ADMIN — HALOPERIDOL 10 MG: 5 TABLET ORAL at 21:58

## 2021-01-05 RX ADMIN — SALMETEROL XINAFOATE 1 PUFF: 50 POWDER, METERED ORAL; RESPIRATORY (INHALATION) at 10:23

## 2021-01-05 RX ADMIN — ASPIRIN 81 MG: 81 TABLET, COATED ORAL at 10:23

## 2021-01-05 RX ADMIN — HALOPERIDOL 10 MG: 5 TABLET ORAL at 10:22

## 2021-01-05 RX ADMIN — MEMANTINE 5 MG: 5 TABLET ORAL at 10:22

## 2021-01-05 RX ADMIN — Medication 0.25 MG: at 20:25

## 2021-01-05 RX ADMIN — DOCUSATE SODIUM 100 MG: 100 CAPSULE, LIQUID FILLED ORAL at 20:25

## 2021-01-05 RX ADMIN — DOCUSATE SODIUM 100 MG: 100 CAPSULE, LIQUID FILLED ORAL at 10:23

## 2021-01-05 ASSESSMENT — ACTIVITIES OF DAILY LIVING (ADL)
ADLS_ACUITY_SCORE: 15

## 2021-01-05 NOTE — PLAN OF CARE
Alert to self only. Flat affect, but cooperative overnight. Denies pain. Tolerating regular diet. Up independent in room. Door alarm active. Discharge pending placement at state facility.

## 2021-01-05 NOTE — PROGRESS NOTES
"[facility]  Hospitalist Progress Note        Rosamaria Angeles MD   01/05/2021        Interval History:      - no acute issues . Resting comfortably in bed. He asks why he is still here. When I explained that we are trying to find a place for him, he starts that he has mansions all over the world that he could go to. Otherwise he has no concerns. Eating well and independent in his room.          Assessment and Plan:        John Juárez is a 54 year old male Group Home resident with PMHx of schizophrenia, hx of TBI, alcohol use disorder, COPD, hyperlipidemia and hypothyroidism who was admitted on 9/9/2020 for evaluation of aggressive behavior at his group home.     He has been evaluated by psychiatry, his medical condition remains stable, though his group home is now unwilling to take him back and thus hospitalization has been prolonged awaiting new placement.      Neurocognitive disorder dt hx of TBI and alcohol use disorder  Schizophrenia  Under commitment  - a Group Home resident PTA and was brought to hospital for evaluation of aggressive behaviors. Group home now unwilling to take him back. Has had numerous CODE 21s called this stay. Seen by psych, meds adjusted. Is currently under civil commitment and court hold through [facility] until 7/31/21.  - psych evaluated on several occasions this admission; previously rec kennedy psych; last evaluated on 12/18 and suggested no need for kennedy psych and to continue current medications without change  - currently on scheduled Haldol 10 mg po TID, namenda, effexor; also on haldol, ativan and zyprexa prn; does have intermittent agitation  - last EKG on 12/18, qTc 428  - Patient has been undergoing paper work for funding and placement  -  following for disposition \"Since patient's admission he has been denied by numerous facilities due to his risk of elopement and his physicial aggression.   Plan:  Await placement at a [facility] run " "facility.\"    Psych re-consulted on 1/4 to inquire about possible transfer to inpatient psychiatry. The patient would likely do better with some more freedom/ability walk around unit etc. He has NO active medical problems that warrant an inpatient medicine bed.     Hyperlipidemia  Continue statin and aspirin.      COPD  Not O2 dependent.  - Continue salmeterol     Hypothyroidism  Continue levothyroxine     Tobacco Use  Using nicotine patch and PRN gum     Resting tremors of upper extremities  No acute issues      Asymptomatic COVID-19 testing negative on 12/6/2020 [done for discharge planning]     DVT Prophylaxis: Pneumatic Compression Devices    Code Status: Full Code      Disposition Plan     Expected discharge: when placement available. Needs to be placed in [facility] facility due to agitation issues per social work                        Physical Exam:      Blood pressure 105/69, pulse 82, temperature 97.1  F (36.2  C), temperature source Oral, resp. rate 16, height 1.829 m (6'), weight 82.6 kg (182 lb), SpO2 95 %.  Vitals:    09/09/20 1754 09/25/20 0632 12/11/20 0700   Weight: 70 kg (154 lb 5.2 oz) 71 kg (156 lb 8 oz) 82.6 kg (182 lb)     Vital Signs with Ranges  Temp:  [97.1  F (36.2  C)] 97.1  F (36.2  C)  Pulse:  [82] 82  Resp:  [16] 16  BP: (105)/(69) 105/69  SpO2:  [95 %] 95 %  I/O's Last 24 hours  I/O last 3 completed shifts:  In: 600 [P.O.:600]  Out: -        Exam:  Constitutional: healthy, alert and no distress  Head: Normocephalic. No masses, lesions, tenderness or abnormalities  Cardiovascular: Regular rate, stable vital signs  Respiratory: No increased work of breathing  Musculoskeletal: extremities normal- no gross deformities noted  Skin: no suspicious lesions or rashes  Psychiatric: +delusions, pleasant           Medications:          aspirin  81 mg Oral Daily     atorvastatin  80 mg Oral At Bedtime     benztropine  0.25 mg Oral BID     docusate sodium  100 mg Oral BID     haloperidol  10 mg " Oral TID     levothyroxine  88 mcg Oral Daily     memantine  5 mg Oral Daily     nicotine  1 patch Transdermal Daily     [START ON 1/8/2021] nicotine  1 patch Transdermal Daily     nicotine   Transdermal Q8H     [START ON 1/8/2021] nicotine   Transdermal Q8H     polyethylene glycol  17 g Oral Daily     salmeterol  1 puff Inhalation BID     venlafaxine  75 mg Oral Daily with breakfast     PRN Meds: acetaminophen, albuterol, alum & mag hydroxide-simethicone, haloperidol, LORazepam, melatonin, naloxone **OR** naloxone **OR** naloxone **OR** naloxone, nicotine, OLANZapine, OLANZapine zydis, ondansetron **OR** ondansetron         Data:      All new lab and imaging data was reviewed.   Recent Labs   Lab Test 12/16/20 0729 12/03/20 0833 09/28/20  0854 06/02/14 2018 06/02/14 2018   WBC 5.7 5.4 5.5   < > 6.5   HGB 14.2 16.4 15.9   < > 14.2   MCV 93 92 93   < > 100    179 189   < > 122*   INR  --   --   --   --  0.99    < > = values in this interval not displayed.      Recent Labs   Lab Test 12/16/20  0729 12/03/20  0833 11/02/20  1019    138 142   POTASSIUM 4.0 4.0 4.2   CHLORIDE 110* 109 111*   CO2 29 24 24   BUN 14 18 19   CR 0.68 0.74 0.71   ANIONGAP 3 5 7   LESLEE 8.6 8.8 9.0   * 95 140*     No lab results found.    Invalid input(s): TROP, TROPONINIES

## 2021-01-06 PROCEDURE — 120N000001 HC R&B MED SURG/OB

## 2021-01-06 PROCEDURE — 250N000013 HC RX MED GY IP 250 OP 250 PS 637: Performed by: PSYCHIATRY & NEUROLOGY

## 2021-01-06 PROCEDURE — 99231 SBSQ HOSP IP/OBS SF/LOW 25: CPT | Performed by: INTERNAL MEDICINE

## 2021-01-06 PROCEDURE — 250N000013 HC RX MED GY IP 250 OP 250 PS 637: Performed by: INTERNAL MEDICINE

## 2021-01-06 PROCEDURE — 250N000013 HC RX MED GY IP 250 OP 250 PS 637: Performed by: HOSPITALIST

## 2021-01-06 RX ORDER — POLYETHYLENE GLYCOL 3350 17 G/17G
17 POWDER, FOR SOLUTION ORAL DAILY PRN
Status: DISCONTINUED | OUTPATIENT
Start: 2021-01-06 | End: 2021-06-30 | Stop reason: HOSPADM

## 2021-01-06 RX ADMIN — Medication 0.25 MG: at 08:27

## 2021-01-06 RX ADMIN — HALOPERIDOL 10 MG: 5 TABLET ORAL at 08:27

## 2021-01-06 RX ADMIN — SALMETEROL XINAFOATE 1 PUFF: 50 POWDER, METERED ORAL; RESPIRATORY (INHALATION) at 21:03

## 2021-01-06 RX ADMIN — SALMETEROL XINAFOATE 1 PUFF: 50 POWDER, METERED ORAL; RESPIRATORY (INHALATION) at 08:30

## 2021-01-06 RX ADMIN — Medication 0.25 MG: at 21:03

## 2021-01-06 RX ADMIN — DOCUSATE SODIUM 100 MG: 100 CAPSULE, LIQUID FILLED ORAL at 08:27

## 2021-01-06 RX ADMIN — ASPIRIN 81 MG: 81 TABLET, COATED ORAL at 08:27

## 2021-01-06 RX ADMIN — LORAZEPAM 1 MG: 1 TABLET ORAL at 08:27

## 2021-01-06 RX ADMIN — ATORVASTATIN CALCIUM 80 MG: 40 TABLET, FILM COATED ORAL at 21:03

## 2021-01-06 RX ADMIN — MEMANTINE 5 MG: 5 TABLET ORAL at 08:27

## 2021-01-06 RX ADMIN — HALOPERIDOL 10 MG: 5 TABLET ORAL at 16:14

## 2021-01-06 RX ADMIN — HALOPERIDOL 10 MG: 5 TABLET ORAL at 21:03

## 2021-01-06 RX ADMIN — DOCUSATE SODIUM 100 MG: 100 CAPSULE, LIQUID FILLED ORAL at 21:04

## 2021-01-06 RX ADMIN — LEVOTHYROXINE SODIUM 88 MCG: 88 TABLET ORAL at 08:27

## 2021-01-06 RX ADMIN — VENLAFAXINE HYDROCHLORIDE 75 MG: 75 CAPSULE, EXTENDED RELEASE ORAL at 08:27

## 2021-01-06 ASSESSMENT — ACTIVITIES OF DAILY LIVING (ADL)
ADLS_ACUITY_SCORE: 15

## 2021-01-06 NOTE — PLAN OF CARE
Flat affect, but cooperative this shift. C/o headache and neck stiffness, given tylenol and heat pack. Tolerating regular diet. Up independent in room. Door alarm active. Discharge pending placement at state facility.
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
## 2021-01-06 NOTE — PLAN OF CARE
Disoriented to situation. Frequently leaving room but redirectable. Blunted, flat affect. Denies pain. Regular diet, denies nausea. Up independently in room. Continent. Discharge pending placement to state facility. Door alarm active.

## 2021-01-06 NOTE — CONSULTS
[facility]  Psychiatry Consultation - Follow-up note      Interim History:   Follow-up requested today, inquiring regarding admission to psychiatry.  The patient was last seen by Dr. Murrieta on December 18 who indicated no indication to pursue admission to inpatient psychiatry.    On examination today, the patient was resting comfortably in his room.  He had just finished his meal and decided to go to the bathroom to wash his hands.  He was watching television comfortably.  He had no specific mental health related concerns.  He reports that his mood is euthymic.  He denies AH/VH today.    He denied suicidal and homicidal thoughts.  He did not report any medication side effects.             Medications:       aspirin  81 mg Oral Daily     atorvastatin  80 mg Oral At Bedtime     benztropine  0.25 mg Oral BID     docusate sodium  100 mg Oral BID     haloperidol  10 mg Oral TID     levothyroxine  88 mcg Oral Daily     memantine  5 mg Oral Daily     nicotine  1 patch Transdermal Daily     [START ON 1/8/2021] nicotine  1 patch Transdermal Daily     nicotine   Transdermal Q8H     [START ON 1/8/2021] nicotine   Transdermal Q8H     polyethylene glycol  17 g Oral Daily     salmeterol  1 puff Inhalation BID     venlafaxine  75 mg Oral Daily with breakfast          Allergies:     Allergies   Allergen Reactions     Ibuprofen      Latex           Labs:   No results found for this or any previous visit (from the past 24 hour(s)).       Psychiatric Examination:     BP 97/62 (BP Location: Left arm)   Pulse 90   Temp 96.1  F (35.6  C) (Axillary)   Resp 16   Ht 1.829 m (6')   Wt 82.6 kg (182 lb)   SpO2 97%   BMI 24.68 kg/m    Weight is 182 lbs 0 oz  Body mass index is 24.68 kg/m .  Orthostatic Vitals     None            Appearance: dressed in hospital scrubs and slightly unkempt  Attitude:  guarded and somewhat cooperative  Eye Contact:  fair  Mood:  good  Affect:  mood congruent  Speech:  clear,  coherent  Psychomotor Behavior:  tremor observed   Throught Process:  tangental  Associations:  no loose associations  Thought Content:  no evidence of suicidal ideation or homicidal ideation and no evidence of psychotic thought  Insight:  limited  Judgement:  limited  Oriented to:  Person only  Attention Span and Concentration:  limited  Recent and Remote Memory:  limited           DIagnoses:     1.  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   2.  Other schizophrenia spectrum disorder.   3.  Alcohol use disorder, in remission.   4.  Stimulant use disorder, in remission.   5.  Chronic obstructive pulmonary disease.   6.  Hypertension.       Recommendations:     Continue haldol for management of psychosis and mood instability associated with a neurocognitive disorder/TBI.  Symptoms currently appear to be under adequate control.  Continue Effexor for mood and anxiety management.    He is under involuntary committment for treatment of mental illness while noting a prominent neurocognitive disorder.     At this time, the patient does not warrant admission to inpatient psychiatry.  His primary impairing diagnosis is a neurocognitive disorder and targeted symptoms are secondary to that condition.  Please continue utilizing the unit  to explore alternative community placement options, i.e. memory care facilities, noting that there are typically extended wait times for admission to state facilities and many patients improve enough to transition back into the community before a bed becomes available to them.      Please reconsult with Psychiatry as needed.   Ze Batres MD   Text Page
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
## 2021-01-07 PROCEDURE — 250N000013 HC RX MED GY IP 250 OP 250 PS 637: Performed by: INTERNAL MEDICINE

## 2021-01-07 PROCEDURE — 250N000013 HC RX MED GY IP 250 OP 250 PS 637: Performed by: PSYCHIATRY & NEUROLOGY

## 2021-01-07 PROCEDURE — 99231 SBSQ HOSP IP/OBS SF/LOW 25: CPT | Performed by: INTERNAL MEDICINE

## 2021-01-07 PROCEDURE — 120N000001 HC R&B MED SURG/OB

## 2021-01-07 PROCEDURE — 250N000013 HC RX MED GY IP 250 OP 250 PS 637: Performed by: HOSPITALIST

## 2021-01-07 RX ADMIN — Medication 0.25 MG: at 21:22

## 2021-01-07 RX ADMIN — HALOPERIDOL 10 MG: 5 TABLET ORAL at 09:41

## 2021-01-07 RX ADMIN — HALOPERIDOL 10 MG: 5 TABLET ORAL at 15:47

## 2021-01-07 RX ADMIN — Medication 0.25 MG: at 09:41

## 2021-01-07 RX ADMIN — NICOTINE 1 PATCH: 14 PATCH, EXTENDED RELEASE TRANSDERMAL at 09:42

## 2021-01-07 RX ADMIN — MEMANTINE 5 MG: 5 TABLET ORAL at 09:41

## 2021-01-07 RX ADMIN — DOCUSATE SODIUM 100 MG: 100 CAPSULE, LIQUID FILLED ORAL at 21:22

## 2021-01-07 RX ADMIN — LEVOTHYROXINE SODIUM 88 MCG: 88 TABLET ORAL at 09:41

## 2021-01-07 RX ADMIN — ATORVASTATIN CALCIUM 80 MG: 40 TABLET, FILM COATED ORAL at 21:22

## 2021-01-07 RX ADMIN — ASPIRIN 81 MG: 81 TABLET, COATED ORAL at 09:41

## 2021-01-07 RX ADMIN — HALOPERIDOL 10 MG: 5 TABLET ORAL at 21:22

## 2021-01-07 RX ADMIN — DOCUSATE SODIUM 100 MG: 100 CAPSULE, LIQUID FILLED ORAL at 09:43

## 2021-01-07 RX ADMIN — SALMETEROL XINAFOATE 1 PUFF: 50 POWDER, METERED ORAL; RESPIRATORY (INHALATION) at 09:47

## 2021-01-07 RX ADMIN — VENLAFAXINE HYDROCHLORIDE 75 MG: 75 CAPSULE, EXTENDED RELEASE ORAL at 09:42

## 2021-01-07 RX ADMIN — SALMETEROL XINAFOATE 1 PUFF: 50 POWDER, METERED ORAL; RESPIRATORY (INHALATION) at 21:25

## 2021-01-07 ASSESSMENT — ACTIVITIES OF DAILY LIVING (ADL)
ADLS_ACUITY_SCORE: 15

## 2021-01-07 NOTE — PLAN OF CARE
Disoriented to situation. VSS on RA. Denies pain. Regular diet tolerating well. Frequently leaving room, requesting to leave and get bags prn ativan given. Independent in room. Door alarm in place.

## 2021-01-07 NOTE — PLAN OF CARE
Patient spent the shift in his room. He has been calm and cooperative. He was noted to be responding to internal stimuli.  Lungs diminished. No edema. On RA. Mood is calm, restless at times. Affect is flat and tense.

## 2021-01-07 NOTE — PLAN OF CARE
Disoriented to situation. Frequently making comments about wanting to leave but redirectable. Denies pain. Regular diet, good appetite. Up independently in room. Door alarm in place. Discharge pending placement, SW following.
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
## 2021-01-08 PROCEDURE — 99231 SBSQ HOSP IP/OBS SF/LOW 25: CPT | Performed by: INTERNAL MEDICINE

## 2021-01-08 PROCEDURE — 250N000013 HC RX MED GY IP 250 OP 250 PS 637: Performed by: INTERNAL MEDICINE

## 2021-01-08 PROCEDURE — 250N000013 HC RX MED GY IP 250 OP 250 PS 637: Performed by: HOSPITALIST

## 2021-01-08 PROCEDURE — 250N000013 HC RX MED GY IP 250 OP 250 PS 637: Performed by: PSYCHIATRY & NEUROLOGY

## 2021-01-08 PROCEDURE — 120N000001 HC R&B MED SURG/OB

## 2021-01-08 RX ADMIN — ASPIRIN 81 MG: 81 TABLET, COATED ORAL at 09:15

## 2021-01-08 RX ADMIN — HALOPERIDOL 5 MG: 5 TABLET ORAL at 23:47

## 2021-01-08 RX ADMIN — NICOTINE 1 PATCH: 7 PATCH, EXTENDED RELEASE TRANSDERMAL at 09:15

## 2021-01-08 RX ADMIN — SALMETEROL XINAFOATE 1 PUFF: 50 POWDER, METERED ORAL; RESPIRATORY (INHALATION) at 09:19

## 2021-01-08 RX ADMIN — DOCUSATE SODIUM 100 MG: 100 CAPSULE, LIQUID FILLED ORAL at 09:15

## 2021-01-08 RX ADMIN — ATORVASTATIN CALCIUM 80 MG: 40 TABLET, FILM COATED ORAL at 21:40

## 2021-01-08 RX ADMIN — HALOPERIDOL 5 MG: 5 TABLET ORAL at 04:34

## 2021-01-08 RX ADMIN — VENLAFAXINE HYDROCHLORIDE 75 MG: 75 CAPSULE, EXTENDED RELEASE ORAL at 09:15

## 2021-01-08 RX ADMIN — DOCUSATE SODIUM 100 MG: 100 CAPSULE, LIQUID FILLED ORAL at 21:40

## 2021-01-08 RX ADMIN — ACETAMINOPHEN 650 MG: 325 TABLET, FILM COATED ORAL at 02:21

## 2021-01-08 RX ADMIN — OLANZAPINE 5 MG: 5 TABLET, ORALLY DISINTEGRATING ORAL at 02:19

## 2021-01-08 RX ADMIN — LEVOTHYROXINE SODIUM 88 MCG: 88 TABLET ORAL at 09:15

## 2021-01-08 RX ADMIN — SALMETEROL XINAFOATE 1 PUFF: 50 POWDER, METERED ORAL; RESPIRATORY (INHALATION) at 21:40

## 2021-01-08 RX ADMIN — HALOPERIDOL 10 MG: 5 TABLET ORAL at 16:10

## 2021-01-08 RX ADMIN — MELATONIN TAB 3 MG 3 MG: 3 TAB at 02:20

## 2021-01-08 RX ADMIN — HALOPERIDOL 10 MG: 5 TABLET ORAL at 21:40

## 2021-01-08 RX ADMIN — Medication 0.25 MG: at 21:40

## 2021-01-08 RX ADMIN — Medication 0.25 MG: at 09:15

## 2021-01-08 RX ADMIN — HALOPERIDOL 10 MG: 5 TABLET ORAL at 09:15

## 2021-01-08 RX ADMIN — MEMANTINE 5 MG: 5 TABLET ORAL at 09:15

## 2021-01-08 ASSESSMENT — ACTIVITIES OF DAILY LIVING (ADL)
ADLS_ACUITY_SCORE: 15

## 2021-01-08 NOTE — PLAN OF CARE
"A&O to self only, redirection provided PRN. Labile in mood at times, intermittently asks to leave stating, \"my rides here, I need to go\"' given PRN Haldol & Zyprexa. VS deferred overnight, ordered daily. Denies pain. No IV access. Independent. Calm & cooperative all night.      "

## 2021-01-08 NOTE — PLAN OF CARE
Patient only alert to self. Flat affect, but calm and cooperative this shift. Made a comment about wanting to leave but redirectable. VSS on RA. Denies pain. Nicotine patch to L shoulder. Independent in room. Door alarm active. Tolerating regular diet. Continent of B&B. No IV, MD aware. Discharge pending placement at state facility.

## 2021-01-08 NOTE — PROGRESS NOTES
MD Notification    Notified Person: MD    Notified Person Name:  Ammy    Notification Date/Time: January 8, 2021 12:41 PM     Notification Interaction: Text page    Purpose of Notification: FYI: Pt has been having low BPs, has daily VS ordered, this AM it was 80s/50s and again at noon. Looks like he has run low in the past. Pt asymptomatic.    Orders Received: No new orders. Continue to monitor.     Comments:
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
## 2021-01-09 PROCEDURE — 99231 SBSQ HOSP IP/OBS SF/LOW 25: CPT | Performed by: INTERNAL MEDICINE

## 2021-01-09 PROCEDURE — 250N000013 HC RX MED GY IP 250 OP 250 PS 637: Performed by: PSYCHIATRY & NEUROLOGY

## 2021-01-09 PROCEDURE — 250N000013 HC RX MED GY IP 250 OP 250 PS 637: Performed by: NURSE PRACTITIONER

## 2021-01-09 PROCEDURE — 250N000013 HC RX MED GY IP 250 OP 250 PS 637: Performed by: INTERNAL MEDICINE

## 2021-01-09 PROCEDURE — 250N000013 HC RX MED GY IP 250 OP 250 PS 637: Performed by: HOSPITALIST

## 2021-01-09 PROCEDURE — 120N000001 HC R&B MED SURG/OB

## 2021-01-09 RX ADMIN — MEMANTINE 5 MG: 5 TABLET ORAL at 09:42

## 2021-01-09 RX ADMIN — HALOPERIDOL 10 MG: 5 TABLET ORAL at 16:31

## 2021-01-09 RX ADMIN — NICOTINE POLACRILEX 2 MG: 2 GUM, CHEWING ORAL at 23:47

## 2021-01-09 RX ADMIN — LEVOTHYROXINE SODIUM 88 MCG: 88 TABLET ORAL at 09:42

## 2021-01-09 RX ADMIN — DOCUSATE SODIUM 100 MG: 100 CAPSULE, LIQUID FILLED ORAL at 22:02

## 2021-01-09 RX ADMIN — HALOPERIDOL 5 MG: 5 TABLET ORAL at 13:23

## 2021-01-09 RX ADMIN — Medication 0.25 MG: at 22:02

## 2021-01-09 RX ADMIN — HALOPERIDOL 10 MG: 5 TABLET ORAL at 09:42

## 2021-01-09 RX ADMIN — ATORVASTATIN CALCIUM 80 MG: 40 TABLET, FILM COATED ORAL at 22:02

## 2021-01-09 RX ADMIN — SALMETEROL XINAFOATE 1 PUFF: 50 POWDER, METERED ORAL; RESPIRATORY (INHALATION) at 11:38

## 2021-01-09 RX ADMIN — MELATONIN TAB 3 MG 3 MG: 3 TAB at 23:47

## 2021-01-09 RX ADMIN — Medication 0.25 MG: at 09:42

## 2021-01-09 RX ADMIN — VENLAFAXINE HYDROCHLORIDE 75 MG: 75 CAPSULE, EXTENDED RELEASE ORAL at 09:42

## 2021-01-09 RX ADMIN — NICOTINE 1 PATCH: 7 PATCH, EXTENDED RELEASE TRANSDERMAL at 09:44

## 2021-01-09 RX ADMIN — HALOPERIDOL 10 MG: 5 TABLET ORAL at 22:02

## 2021-01-09 RX ADMIN — ASPIRIN 81 MG: 81 TABLET, COATED ORAL at 09:43

## 2021-01-09 RX ADMIN — DOCUSATE SODIUM 100 MG: 100 CAPSULE, LIQUID FILLED ORAL at 09:42

## 2021-01-09 ASSESSMENT — ACTIVITIES OF DAILY LIVING (ADL)
ADLS_ACUITY_SCORE: 15
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
## 2021-01-09 NOTE — PLAN OF CARE
Pt alert to self only. Did not sleep that well overnight. Able to redirect. Up ind. Nicotine patch to R deltoid. Door alarm on. Awaiting placement.

## 2021-01-09 NOTE — PLAN OF CARE
Alert to self only but redirectable. Door alarm activated. Up independently in room. Nicotine patch on left arm. Discharge pending placement.

## 2021-01-09 NOTE — PLAN OF CARE
A/o to self. VSS, daily. Up ind, frequently ambulating in room. Door alarm on. Nicotine patch to R arm. Discharge pending.

## 2021-01-10 PROCEDURE — 250N000013 HC RX MED GY IP 250 OP 250 PS 637: Performed by: HOSPITALIST

## 2021-01-10 PROCEDURE — 250N000013 HC RX MED GY IP 250 OP 250 PS 637: Performed by: PSYCHIATRY & NEUROLOGY

## 2021-01-10 PROCEDURE — 250N000013 HC RX MED GY IP 250 OP 250 PS 637: Performed by: INTERNAL MEDICINE

## 2021-01-10 PROCEDURE — 120N000001 HC R&B MED SURG/OB

## 2021-01-10 PROCEDURE — 99231 SBSQ HOSP IP/OBS SF/LOW 25: CPT | Performed by: INTERNAL MEDICINE

## 2021-01-10 RX ADMIN — HALOPERIDOL 10 MG: 5 TABLET ORAL at 10:09

## 2021-01-10 RX ADMIN — Medication 0.25 MG: at 10:09

## 2021-01-10 RX ADMIN — NICOTINE 1 PATCH: 7 PATCH, EXTENDED RELEASE TRANSDERMAL at 10:12

## 2021-01-10 RX ADMIN — ASPIRIN 81 MG: 81 TABLET, COATED ORAL at 10:10

## 2021-01-10 RX ADMIN — SALMETEROL XINAFOATE 1 PUFF: 50 POWDER, METERED ORAL; RESPIRATORY (INHALATION) at 21:36

## 2021-01-10 RX ADMIN — ATORVASTATIN CALCIUM 80 MG: 40 TABLET, FILM COATED ORAL at 21:36

## 2021-01-10 RX ADMIN — VENLAFAXINE HYDROCHLORIDE 75 MG: 75 CAPSULE, EXTENDED RELEASE ORAL at 10:10

## 2021-01-10 RX ADMIN — MEMANTINE 5 MG: 5 TABLET ORAL at 10:10

## 2021-01-10 RX ADMIN — LEVOTHYROXINE SODIUM 88 MCG: 88 TABLET ORAL at 10:09

## 2021-01-10 RX ADMIN — ACETAMINOPHEN 650 MG: 325 TABLET, FILM COATED ORAL at 21:40

## 2021-01-10 RX ADMIN — DOCUSATE SODIUM 100 MG: 100 CAPSULE, LIQUID FILLED ORAL at 10:10

## 2021-01-10 RX ADMIN — ACETAMINOPHEN 650 MG: 325 TABLET, FILM COATED ORAL at 16:10

## 2021-01-10 RX ADMIN — HALOPERIDOL 10 MG: 5 TABLET ORAL at 21:36

## 2021-01-10 RX ADMIN — DOCUSATE SODIUM 100 MG: 100 CAPSULE, LIQUID FILLED ORAL at 21:37

## 2021-01-10 RX ADMIN — Medication 0.25 MG: at 21:37

## 2021-01-10 RX ADMIN — HALOPERIDOL 10 MG: 5 TABLET ORAL at 16:10

## 2021-01-10 RX ADMIN — SALMETEROL XINAFOATE 1 PUFF: 50 POWDER, METERED ORAL; RESPIRATORY (INHALATION) at 10:09

## 2021-01-10 ASSESSMENT — ACTIVITIES OF DAILY LIVING (ADL)
ADLS_ACUITY_SCORE: 15

## 2021-01-10 NOTE — PLAN OF CARE
Alert to self only. Calm/slept throughout shift. Pt asked writer if they can smoke, writer provided education. Gave prn nicotine gum x1. Nicotine patch in place to left-deltoid. Up independently in room. Door alarm in place. Discharge pending placement.

## 2021-01-10 NOTE — PLAN OF CARE
Alert to self. Calm and pleasant, redirectable. Door alarm in place. Up ind in room. Nicotine patch to L arm. Discharge pending placement.

## 2021-01-10 NOTE — PROGRESS NOTES
[facility]    Hospitalist Progress Note    Assessment & Plan   John Juárez is a 54 year old male Group Home resident with PMHx of schizophrenia, hx of TBI, alcohol use disorder, COPD, hyperlipidemia and hypothyroidism who was admitted on 9/9/2020 for evaluation of aggressive behavior at his group home.  He has been evaluated by psychiatry, his medical condition remains stable, though his group home is now unwilling to take him back and thus hospitalization has been prolonged awaiting new placement.      Neurocognitive disorder dt hx of TBI and alcohol use disorder  Schizophrenia  Under commitment  Had been residing in group home prior to admission.  Brought to hospital for evaluation of aggressive behaviors. Group home now unwilling to take him back. Has had numerous CODE 21s called this stay. Seen by psych, meds adjusted. Is currently under civil commitment and court hold through [facility] until 7/31/21. Earlier on in stay, psych had recommended geripsych placement but per evaluation on 12/18, no longer felt this was needed and recommended to continue current meds. QTc 428 on EKG on 12/18. Seen again by psych on 1/5/21 and again not felt to need inpatient psych stay.   -- conts on Haldol 10mg TID, Namenda, Effexor   -- prns available for intermittent agitation  -- SW following for placement    Periodic Hypotension  Noted this stay. Patient asymptomatic. No concerns     Hyperlipidemia  Chronic and stable on statin and aspirin.      COPD  Not O2 dependent. Cont inhalers     Hypothyroidism  Chronic and stable on levothyroxine     Tobacco Use  Using nicotine patch and PRN gum     Resting tremors of upper extremities  No acute issues     Asymptomatic COVID19 swab was neg on 12/6/20 (done for discharge planning)     FEN: no IVFs, lytes stable, regular diet  DVT Prophylaxis: PCDs  Code Status: Full Code    Disposition: Discharge date unclear. Pending placement. SW following, notes  reviewed.    Luba Gimenez    Interval History    Uneventful night. BPs remain periodically soft but continues to be asymptomatic. Ambulating without difficulty. Seen this morning, sleeping quietly. Did not wake during my visit. Discussed with bedside RN, no concerns.    -Data reviewed today: I reviewed all new labs and imaging results over the last 24 hours. I personally reviewed no images or EKG's today.    Physical Exam   Temp: 96.7  F (35.9  C) Temp src: Oral BP: 97/69 Pulse: 80   Resp: 16 SpO2: 98 % O2 Device: None (Room air)    Vitals:    09/09/20 1754 09/25/20 0632 12/11/20 0700   Weight: 70 kg (154 lb 5.2 oz) 71 kg (156 lb 8 oz) 82.6 kg (182 lb)     Vital Signs with Ranges  Temp:  [96.7  F (35.9  C)] 96.7  F (35.9  C)  Pulse:  [80-91] 80  Resp:  [16] 16  BP: (86-97)/(55-69) 97/69  SpO2:  [98 %] 98 %  No intake/output data recorded.    Constitutional: Sleeping quietly, did not wake during my visit, NAD  Respiratory: breathing nonlabored  Cardiovascular: extremities perfused  GI: ND  Skin/Integumen: warm/dry  Other:      Medications       aspirin  81 mg Oral Daily     atorvastatin  80 mg Oral At Bedtime     benztropine  0.25 mg Oral BID     docusate sodium  100 mg Oral BID     haloperidol  10 mg Oral TID     levothyroxine  88 mcg Oral Daily     memantine  5 mg Oral Daily     nicotine  1 patch Transdermal Daily     nicotine   Transdermal Q8H     nicotine   Transdermal Q8H     salmeterol  1 puff Inhalation BID     venlafaxine  75 mg Oral Daily with breakfast       Data   No lab results found in last 7 days.    No results found for this or any previous visit (from the past 24 hour(s)).

## 2021-01-10 NOTE — PLAN OF CARE
Alert to self, calm and cooperative this shift. Nicotine patch site rotated to right deltoid. Independent in room with door alarm in place. Discharge pending placement.

## 2021-01-11 PROCEDURE — 99231 SBSQ HOSP IP/OBS SF/LOW 25: CPT | Performed by: INTERNAL MEDICINE

## 2021-01-11 PROCEDURE — 250N000013 HC RX MED GY IP 250 OP 250 PS 637: Performed by: PSYCHIATRY & NEUROLOGY

## 2021-01-11 PROCEDURE — 120N000001 HC R&B MED SURG/OB

## 2021-01-11 PROCEDURE — 250N000013 HC RX MED GY IP 250 OP 250 PS 637: Performed by: HOSPITALIST

## 2021-01-11 PROCEDURE — 250N000013 HC RX MED GY IP 250 OP 250 PS 637: Performed by: INTERNAL MEDICINE

## 2021-01-11 RX ADMIN — HALOPERIDOL 10 MG: 5 TABLET ORAL at 21:19

## 2021-01-11 RX ADMIN — SALMETEROL XINAFOATE 1 PUFF: 50 POWDER, METERED ORAL; RESPIRATORY (INHALATION) at 21:23

## 2021-01-11 RX ADMIN — DOCUSATE SODIUM 100 MG: 100 CAPSULE, LIQUID FILLED ORAL at 21:18

## 2021-01-11 RX ADMIN — NICOTINE 1 PATCH: 7 PATCH, EXTENDED RELEASE TRANSDERMAL at 07:50

## 2021-01-11 RX ADMIN — VENLAFAXINE HYDROCHLORIDE 75 MG: 75 CAPSULE, EXTENDED RELEASE ORAL at 07:50

## 2021-01-11 RX ADMIN — Medication 0.25 MG: at 21:18

## 2021-01-11 RX ADMIN — ATORVASTATIN CALCIUM 80 MG: 40 TABLET, FILM COATED ORAL at 21:18

## 2021-01-11 RX ADMIN — HALOPERIDOL 10 MG: 5 TABLET ORAL at 15:33

## 2021-01-11 RX ADMIN — ASPIRIN 81 MG: 81 TABLET, COATED ORAL at 07:49

## 2021-01-11 RX ADMIN — HALOPERIDOL 10 MG: 5 TABLET ORAL at 07:49

## 2021-01-11 RX ADMIN — DOCUSATE SODIUM 100 MG: 100 CAPSULE, LIQUID FILLED ORAL at 07:50

## 2021-01-11 RX ADMIN — SALMETEROL XINAFOATE 1 PUFF: 50 POWDER, METERED ORAL; RESPIRATORY (INHALATION) at 07:50

## 2021-01-11 RX ADMIN — LEVOTHYROXINE SODIUM 88 MCG: 88 TABLET ORAL at 07:49

## 2021-01-11 RX ADMIN — Medication 0.25 MG: at 07:49

## 2021-01-11 RX ADMIN — MEMANTINE 5 MG: 5 TABLET ORAL at 07:50

## 2021-01-11 ASSESSMENT — ACTIVITIES OF DAILY LIVING (ADL)
ADLS_ACUITY_SCORE: 15

## 2021-01-11 NOTE — PROGRESS NOTES
[facility]    Medicine Progress Note - Hospitalist Service       Date of Admission:  9/9/2020    Assessment & Plan   John Juárez is a 55 year old male with PMHx of schizophrenia, hx of TBI, alcohol use disorder, COPD, hyperlipidemia and hypothyroidism who was admitted on 9/9/2020 for evaluation of aggressive behavior at his group home.  He has been evaluated by Psychiatry and his medical condition remains stable, though his group home is now unwilling to take him back and thus hospitalization has been prolonged awaiting new placement.      Neurocognitive disorder dt hx of TBI and alcohol use disorder  Schizophrenia  Under commitment  Had been residing in group home prior to admission.  Brought to hospital for evaluation of aggressive behaviors. Group home now unwilling to take him back. Has had numerous CODE 21s called this stay. Seen by psych, meds adjusted. Is currently under civil commitment and court hold through [facility] until 7/31/21. Earlier on in stay, psych had recommended geripsych placement but per evaluation on 12/18, no longer felt this was needed and recommended to continue current meds. QTc 428 on EKG on 12/18. Seen again by psych on 1/5/21 and again not felt to need inpatient psych stay.   -- On Haldol 10mg TID, memantine 5 mg daily, venlafaxine 75 mg daily, benztropine 0.25 mg BID  -- Haloperidol, olanzapine, and lorazepam PRN for acute agitation  -- SW following for placement     Periodic Hypotension  Noted this stay. Patient asymptomatic. No concerns     Hyperlipidemia  Chronic and stable on atorvastatin     COPD  Chronic and stable on salmeterol, albuterol prn     Hypothyroidism  Chronic and stable on levothyroxine     Tobacco Use  Using nicotine patch and PRN gum     Resting tremors of upper extremities  No acute issues      Asymptomatic COVID19 screening for discharge planning: negative on 12/6/2020     Diet: Room Service  Combination Diet Regular Diet  Adult; Safe Tray - NO utensils  Diet    DVT Prophylaxis: Pneumatic Compression Devices  Valentin Catheter: not present  Code Status: Full Code         Disposition: Expected discharge pending placement    Elva Solano MD  Hospitalist Service  [facility]  Contact information available via [facility] Paging/Directory    ______________________________________________________________________    Interval History   No events overnight. Offers no concerns.     Data reviewed today: I reviewed all medications, new labs and imaging results over the last 24 hours. I personally reviewed no images or EKG's today.    Physical Exam   Vital Signs: Temp: 96.5  F (35.8  C) Temp src: Oral BP: (!) 148/69 Pulse: 84   Resp: 13 SpO2: 98 % O2 Device: None (Room air)    Weight: 182 lbs 0 oz  Constitutional: Resting comfortably, NAD  Respiratory: Breathing non-labored. Lungs CTAB - no wheezes, crackles, or rhonchi  Cardiovascular: Heart RRR, no m/r/g. No pedal edema  GI: +BS, abd soft/NT  Skin/Integument: No rash  Musculoskeletal: Normal muscle bulk and tone  Neuro: Alert, answers 'yes/no' appropriately, MICHAELS  Psych: Calm and cooperative    Data   No lab results found in last 7 days.      No results found for this or any previous visit (from the past 24 hour(s)).    Medications       aspirin  81 mg Oral Daily     atorvastatin  80 mg Oral At Bedtime     benztropine  0.25 mg Oral BID     docusate sodium  100 mg Oral BID     haloperidol  10 mg Oral TID     levothyroxine  88 mcg Oral Daily     memantine  5 mg Oral Daily     nicotine  1 patch Transdermal Daily     nicotine   Transdermal Q8H     salmeterol  1 puff Inhalation BID     venlafaxine  75 mg Oral Daily with breakfast

## 2021-01-11 NOTE — PLAN OF CARE
Alert to self. Calm and pleasant this evening. C/o pain to L buttocks, and headache PRN tylenol given for relief. Door alarm in place. Up ind in room. Nicotine patch to R arm. Awaiting placement.

## 2021-01-11 NOTE — PLAN OF CARE
A&O to self. Slept well ex, called 911 after waking up confused - easily redirected. Regular diet. Up ad holley independent. Denies pain. Door alarm in place. Awaiting placement.

## 2021-01-11 NOTE — PLAN OF CARE
VSS. Disoriented to time, place and situation. Cooperative this shift. No complaints. Nicotine patch on left deltoid. Good appetite. Up independently in room. Discharge pending placement. Continue to monitor.

## 2021-01-12 PROCEDURE — 120N000001 HC R&B MED SURG/OB

## 2021-01-12 PROCEDURE — 99231 SBSQ HOSP IP/OBS SF/LOW 25: CPT | Performed by: INTERNAL MEDICINE

## 2021-01-12 PROCEDURE — 250N000013 HC RX MED GY IP 250 OP 250 PS 637: Performed by: HOSPITALIST

## 2021-01-12 PROCEDURE — 250N000013 HC RX MED GY IP 250 OP 250 PS 637: Performed by: PSYCHIATRY & NEUROLOGY

## 2021-01-12 PROCEDURE — 250N000013 HC RX MED GY IP 250 OP 250 PS 637: Performed by: INTERNAL MEDICINE

## 2021-01-12 RX ADMIN — ASPIRIN 81 MG: 81 TABLET, COATED ORAL at 09:13

## 2021-01-12 RX ADMIN — HALOPERIDOL 10 MG: 5 TABLET ORAL at 15:47

## 2021-01-12 RX ADMIN — LEVOTHYROXINE SODIUM 88 MCG: 88 TABLET ORAL at 09:12

## 2021-01-12 RX ADMIN — VENLAFAXINE HYDROCHLORIDE 75 MG: 75 CAPSULE, EXTENDED RELEASE ORAL at 09:12

## 2021-01-12 RX ADMIN — Medication 0.25 MG: at 22:00

## 2021-01-12 RX ADMIN — DOCUSATE SODIUM 100 MG: 100 CAPSULE, LIQUID FILLED ORAL at 09:12

## 2021-01-12 RX ADMIN — HALOPERIDOL 10 MG: 5 TABLET ORAL at 09:12

## 2021-01-12 RX ADMIN — MEMANTINE 5 MG: 5 TABLET ORAL at 09:12

## 2021-01-12 RX ADMIN — NICOTINE 1 PATCH: 7 PATCH, EXTENDED RELEASE TRANSDERMAL at 09:11

## 2021-01-12 RX ADMIN — HALOPERIDOL 10 MG: 5 TABLET ORAL at 22:00

## 2021-01-12 RX ADMIN — DOCUSATE SODIUM 100 MG: 100 CAPSULE, LIQUID FILLED ORAL at 22:00

## 2021-01-12 RX ADMIN — SALMETEROL XINAFOATE 1 PUFF: 50 POWDER, METERED ORAL; RESPIRATORY (INHALATION) at 22:02

## 2021-01-12 RX ADMIN — ATORVASTATIN CALCIUM 80 MG: 40 TABLET, FILM COATED ORAL at 22:00

## 2021-01-12 RX ADMIN — Medication 0.25 MG: at 09:12

## 2021-01-12 RX ADMIN — SALMETEROL XINAFOATE 1 PUFF: 50 POWDER, METERED ORAL; RESPIRATORY (INHALATION) at 09:11

## 2021-01-12 ASSESSMENT — ACTIVITIES OF DAILY LIVING (ADL)
ADLS_ACUITY_SCORE: 15

## 2021-01-12 NOTE — PROGRESS NOTES
[facility]    Medicine Progress Note - Hospitalist Service       Date of Admission:  9/9/2020    Assessment & Plan   John Juárez is a 55 year old male with PMHx of schizophrenia, hx of TBI, alcohol use disorder, COPD, hyperlipidemia and hypothyroidism who was admitted on 9/9/2020 for evaluation of aggressive behavior at his group home.  He has been evaluated by Psychiatry and his medical condition remains stable, though his group home is now unwilling to take him back and thus hospitalization has been prolonged awaiting new placement.      Neurocognitive disorder dt hx of TBI and alcohol use disorder  Schizophrenia  Under commitment  Had been residing in group home prior to admission.  Brought to hospital for evaluation of aggressive behaviors. Group home now unwilling to take him back. Has had numerous CODE 21s called this stay. Seen by psych, meds adjusted. Is currently under civil commitment and court hold through [facility] until 7/31/21. Earlier on in stay, psych had recommended geripsych placement but per evaluation on 12/18, no longer felt this was needed and recommended to continue current meds. QTc 428 on EKG on 12/18. Seen again by psych on 1/5/21 and again not felt to need inpatient psych stay.   -- On Haldol 10mg TID, memantine 5 mg daily, venlafaxine 75 mg daily, benztropine 0.25 mg BID  -- Haloperidol, olanzapine, and lorazepam PRN for acute agitation  -- SW following for placement     Periodic Hypotension  Noted this stay. Patient asymptomatic. No concerns     Hyperlipidemia  Chronic and stable on atorvastatin     COPD  Chronic and stable on salmeterol, albuterol prn     Hypothyroidism  Chronic and stable on levothyroxine     Tobacco Use  Using nicotine patch and PRN gum     Resting tremors of upper extremities  No acute issues      Asymptomatic COVID19 screening for discharge planning: negative on 12/6/2020     Diet: Room Service  Combination Diet Regular Diet  Adult; Safe Tray - NO utensils  Diet    DVT Prophylaxis: Pneumatic Compression Devices  Valentin Catheter: not present  Code Status: Full Code         Disposition: Expected discharge pending placement    Elva Solano MD  Hospitalist Service  [facility]  Contact information available via [facility] Paging/Directory    ______________________________________________________________________    Interval History   No events overnight. Offers no concerns.     Data reviewed today: I reviewed all medications, new labs and imaging results over the last 24 hours. I personally reviewed no images or EKG's today.    Physical Exam   Vital Signs: Temp: 97.6  F (36.4  C) Temp src: Oral BP: 101/73 Pulse: 78   Resp: 16 SpO2: 97 % O2 Device: None (Room air)    Weight: 182 lbs 0 oz  Constitutional: Resting comfortably, NAD  Respiratory: Breathing non-labored.  Neuro: Alert, answers 'yes/no' appropriately, MICHAELS  Psych: Calm and cooperative    Data   No lab results found in last 7 days.      No results found for this or any previous visit (from the past 24 hour(s)).    Medications       aspirin  81 mg Oral Daily     atorvastatin  80 mg Oral At Bedtime     benztropine  0.25 mg Oral BID     docusate sodium  100 mg Oral BID     haloperidol  10 mg Oral TID     levothyroxine  88 mcg Oral Daily     memantine  5 mg Oral Daily     nicotine  1 patch Transdermal Daily     nicotine   Transdermal Q8H     salmeterol  1 puff Inhalation BID     venlafaxine  75 mg Oral Daily with breakfast

## 2021-01-12 NOTE — PLAN OF CARE
Calm and cooperative, no c/o pain. Up ad holley. Nicotine patch to L deltoid. [facility] to state facility when bed available.

## 2021-01-12 NOTE — PLAN OF CARE
Cooperative and calm this shift.  CO pain in left buttock, ice applied.  Skin intact. Good appetite.  Ambulating independently within room.  Nicotine patch applied to right deltoid.  Plan to discharge to state facility when bed available.  Nursing will continue to monitor.

## 2021-01-12 NOTE — PLAN OF CARE
Pt calm and cooperative this shift. Denies pain. Regular diet. Nicotine patch in place. Up independently in room. Discharge awaiting placement.

## 2021-01-13 PROCEDURE — 120N000001 HC R&B MED SURG/OB

## 2021-01-13 PROCEDURE — 250N000013 HC RX MED GY IP 250 OP 250 PS 637: Performed by: INTERNAL MEDICINE

## 2021-01-13 PROCEDURE — 250N000013 HC RX MED GY IP 250 OP 250 PS 637: Performed by: PSYCHIATRY & NEUROLOGY

## 2021-01-13 PROCEDURE — 99231 SBSQ HOSP IP/OBS SF/LOW 25: CPT | Performed by: INTERNAL MEDICINE

## 2021-01-13 PROCEDURE — 250N000013 HC RX MED GY IP 250 OP 250 PS 637: Performed by: HOSPITALIST

## 2021-01-13 RX ADMIN — ATORVASTATIN CALCIUM 80 MG: 40 TABLET, FILM COATED ORAL at 21:22

## 2021-01-13 RX ADMIN — Medication 0.25 MG: at 21:22

## 2021-01-13 RX ADMIN — ASPIRIN 81 MG: 81 TABLET, COATED ORAL at 09:31

## 2021-01-13 RX ADMIN — LEVOTHYROXINE SODIUM 88 MCG: 88 TABLET ORAL at 09:30

## 2021-01-13 RX ADMIN — SALMETEROL XINAFOATE 1 PUFF: 50 POWDER, METERED ORAL; RESPIRATORY (INHALATION) at 09:37

## 2021-01-13 RX ADMIN — MEMANTINE 5 MG: 5 TABLET ORAL at 09:31

## 2021-01-13 RX ADMIN — DOCUSATE SODIUM 100 MG: 100 CAPSULE, LIQUID FILLED ORAL at 09:31

## 2021-01-13 RX ADMIN — HALOPERIDOL 10 MG: 5 TABLET ORAL at 09:31

## 2021-01-13 RX ADMIN — DOCUSATE SODIUM 100 MG: 100 CAPSULE, LIQUID FILLED ORAL at 21:22

## 2021-01-13 RX ADMIN — SALMETEROL XINAFOATE 1 PUFF: 50 POWDER, METERED ORAL; RESPIRATORY (INHALATION) at 21:25

## 2021-01-13 RX ADMIN — HALOPERIDOL 10 MG: 5 TABLET ORAL at 21:22

## 2021-01-13 RX ADMIN — NICOTINE 1 PATCH: 7 PATCH, EXTENDED RELEASE TRANSDERMAL at 09:32

## 2021-01-13 RX ADMIN — HALOPERIDOL 10 MG: 5 TABLET ORAL at 16:08

## 2021-01-13 RX ADMIN — Medication 0.25 MG: at 09:31

## 2021-01-13 RX ADMIN — VENLAFAXINE HYDROCHLORIDE 75 MG: 75 CAPSULE, EXTENDED RELEASE ORAL at 09:31

## 2021-01-13 ASSESSMENT — ACTIVITIES OF DAILY LIVING (ADL)
ADLS_ACUITY_SCORE: 15

## 2021-01-13 NOTE — PROGRESS NOTES
Medicine Progress Note - Hospitalist Service       Date of Admission:  9/9/2020    Assessment & Plan   John Juárez is a 55 year old male with PMHx of schizophrenia, hx of TBI, alcohol use disorder, COPD, hyperlipidemia and hypothyroidism who was admitted on 9/9/2020 for evaluation of aggressive behavior at his group home.  He has been evaluated by Psychiatry and his medical condition remains stable, though his group home is now unwilling to take him back and thus hospitalization has been prolonged awaiting new placement.      Neurocognitive disorder dt hx of TBI and alcohol use disorder  Schizophrenia  Under commitment  Had been residing in group home prior to admission.  Brought to hospital for evaluation of aggressive behaviors. Group home now unwilling to take him back. Has had numerous CODE 21s called this stay. Seen by psych, meds adjusted. Is currently under civil commitment and court hold through [facility] until 7/31/21. Earlier on in stay, psych had recommended geripsych placement but per evaluation on 12/18, no longer felt this was needed and recommended to continue current meds. QTc 428 on EKG on 12/18. Seen again by psych on 1/5/21 and again not felt to need inpatient psych stay.   -- On Haldol 10mg TID, memantine 5 mg daily, venlafaxine 75 mg daily, benztropine 0.25 mg BID  -- Haloperidol, olanzapine, and lorazepam PRN for acute agitation  -- SW following for placement     Periodic Hypotension  Noted this stay. Patient asymptomatic. No concerns     Hyperlipidemia  Chronic and stable on atorvastatin     COPD  Chronic and stable on salmeterol, albuterol prn     Hypothyroidism  Chronic and stable on levothyroxine     Tobacco Use  Using nicotine patch and PRN gum     Resting tremors of upper extremities  No acute issues      Asymptomatic COVID19 screening for discharge planning: negative on 12/6/2020     Diet: Room Service  Combination Diet Regular Diet  Adult; Safe Tray - NO utensils  Diet    DVT Prophylaxis: Pneumatic Compression Devices  Valentin Catheter: not present  Code Status: Full Code         Disposition: Expected discharge pending placement    Elva Solano MD  Hospitalist Service  [facility]  Contact information available via [facility] Paging/Directory    ______________________________________________________________________    Interval History   No events overnight. Offers no concerns.     Data reviewed today: I reviewed all medications, new labs and imaging results over the last 24 hours. I personally reviewed no images or EKG's today.    Physical Exam   Vital Signs: Temp: 97  F (36.1  C) Temp src: Oral BP: 96/59 Pulse: 83   Resp: 16 SpO2: 97 % O2 Device: None (Room air)    Weight: 182 lbs 0 oz  Constitutional: Resting comfortably, NAD  Respiratory: Breathing non-labored.  Neuro: Alert, answers 'yes/no' appropriately, MICHAELS  Psych: Calm and cooperative    Data   No lab results found in last 7 days.      No results found for this or any previous visit (from the past 24 hour(s)).    Medications       aspirin  81 mg Oral Daily     atorvastatin  80 mg Oral At Bedtime     benztropine  0.25 mg Oral BID     docusate sodium  100 mg Oral BID     haloperidol  10 mg Oral TID     levothyroxine  88 mcg Oral Daily     memantine  5 mg Oral Daily     nicotine  1 patch Transdermal Daily     nicotine   Transdermal Q8H     salmeterol  1 puff Inhalation BID     venlafaxine  75 mg Oral Daily with breakfast

## 2021-01-13 NOTE — PROGRESS NOTES
[id number]: Uneventful shift. Calm, cooperative. Regular diet, good intake for dinner. Nicotine patch on left deltoid.

## 2021-01-13 NOTE — PLAN OF CARE
VSS.  Calm and cooperative.  Ambulating independently within room.  Fair appetite.  Resting between cares.  Nicotine patch on left deltoid. Awaiting placement.  Nursing will continue to monitor.

## 2021-01-14 PROCEDURE — 250N000013 HC RX MED GY IP 250 OP 250 PS 637: Performed by: INTERNAL MEDICINE

## 2021-01-14 PROCEDURE — 99231 SBSQ HOSP IP/OBS SF/LOW 25: CPT | Performed by: INTERNAL MEDICINE

## 2021-01-14 PROCEDURE — 120N000001 HC R&B MED SURG/OB

## 2021-01-14 PROCEDURE — 250N000013 HC RX MED GY IP 250 OP 250 PS 637: Performed by: PSYCHIATRY & NEUROLOGY

## 2021-01-14 PROCEDURE — 250N000013 HC RX MED GY IP 250 OP 250 PS 637: Performed by: HOSPITALIST

## 2021-01-14 RX ADMIN — ATORVASTATIN CALCIUM 80 MG: 40 TABLET, FILM COATED ORAL at 22:03

## 2021-01-14 RX ADMIN — HALOPERIDOL 10 MG: 5 TABLET ORAL at 08:41

## 2021-01-14 RX ADMIN — DOCUSATE SODIUM 100 MG: 100 CAPSULE, LIQUID FILLED ORAL at 08:41

## 2021-01-14 RX ADMIN — HALOPERIDOL 10 MG: 5 TABLET ORAL at 17:38

## 2021-01-14 RX ADMIN — DOCUSATE SODIUM 100 MG: 100 CAPSULE, LIQUID FILLED ORAL at 22:03

## 2021-01-14 RX ADMIN — LEVOTHYROXINE SODIUM 88 MCG: 88 TABLET ORAL at 08:41

## 2021-01-14 RX ADMIN — MELATONIN TAB 3 MG 3 MG: 3 TAB at 01:40

## 2021-01-14 RX ADMIN — SALMETEROL XINAFOATE 1 PUFF: 50 POWDER, METERED ORAL; RESPIRATORY (INHALATION) at 22:06

## 2021-01-14 RX ADMIN — HALOPERIDOL 10 MG: 5 TABLET ORAL at 22:03

## 2021-01-14 RX ADMIN — MEMANTINE 5 MG: 5 TABLET ORAL at 08:41

## 2021-01-14 RX ADMIN — ASPIRIN 81 MG: 81 TABLET, COATED ORAL at 08:41

## 2021-01-14 RX ADMIN — VENLAFAXINE HYDROCHLORIDE 75 MG: 75 CAPSULE, EXTENDED RELEASE ORAL at 08:41

## 2021-01-14 RX ADMIN — OLANZAPINE 5 MG: 5 TABLET, ORALLY DISINTEGRATING ORAL at 18:29

## 2021-01-14 RX ADMIN — NICOTINE 1 PATCH: 7 PATCH, EXTENDED RELEASE TRANSDERMAL at 10:00

## 2021-01-14 RX ADMIN — Medication 0.25 MG: at 08:41

## 2021-01-14 RX ADMIN — Medication 0.25 MG: at 22:03

## 2021-01-14 ASSESSMENT — ACTIVITIES OF DAILY LIVING (ADL)
ADLS_ACUITY_SCORE: 15

## 2021-01-14 NOTE — PROGRESS NOTES
Care Management Follow Up    Length of Stay (days): 117    Expected Discharge Date: 01/20/21(awaiting placement)     Concerns to be Addressed: discharge planning     Patient plan of care discussed at interdisciplinary rounds: Yes    Anticipated Discharge Disposition: Group Home     Anticipated Discharge Services:    Anticipated Discharge DME: None    Patient/family educated on Medicare website which has current facility and service quality ratings:    Education Provided on the Discharge Plan:    Patient/Family in Agreement with the Plan:      Referrals Placed by CM/SW: Post Acute Facilities  Private pay costs discussed: Not applicable    Additional Information:  Patient's Behavioral  under patient's commitment is Kurtis Chapin thru [employer].  Mr Chapin requested an udated Diagnostic Assessment which is a required document when a patient is under a [place] Civil Commitment.  The document was completed and emailed to Mr Chapin.  Writer asked Mr Chapin for an update regarding where patient is on the wait list for a [facility] ([facility]      ASIM CastilloSW

## 2021-01-14 NOTE — PLAN OF CARE
A&O to self only. Mostly cooperative this shift, somewhat agitated overnight. Door alarm in place. Awaiting placement.

## 2021-01-14 NOTE — PROGRESS NOTES
[facility]    Medicine Progress Note - Hospitalist Service       Date of Admission:  9/9/2020    Assessment & Plan   John Juárez is a 55 year old male with PMHx of schizophrenia, hx of TBI, alcohol use disorder, COPD, hyperlipidemia and hypothyroidism who was admitted on 9/9/2020 for evaluation of aggressive behavior at his group home.  He has been evaluated by Psychiatry and his medical condition remains stable, though his group home is now unwilling to take him back and thus hospitalization has been prolonged awaiting new placement.      Neurocognitive disorder dt hx of TBI and alcohol use disorder  Schizophrenia  Under commitment  Had been residing in group home prior to admission.  Brought to hospital for evaluation of aggressive behaviors. Group home now unwilling to take him back. Has had numerous CODE 21s called this stay. Seen by psych, meds adjusted. Is currently under civil commitment and court hold through [facility] until 7/31/21. Earlier on in stay, psych had recommended geripsych placement but per evaluation on 12/18, no longer felt this was needed and recommended to continue current meds. QTc 428 on EKG on 12/18. Seen again by psych on 1/5/21 and again not felt to need inpatient psych stay.   -- On Haldol 10mg TID, memantine 5 mg daily, venlafaxine 75 mg daily, benztropine 0.25 mg BID  -- Haloperidol, olanzapine, and lorazepam PRN for acute agitation  -- SW following for placement     Baseline Hypotension  BP 90s systolic on average. Asymptomatic. No concerns     Hyperlipidemia  Chronic and stable on atorvastatin     COPD  Chronic and stable on salmeterol, albuterol prn     Hypothyroidism  Chronic and stable on levothyroxine     Tobacco use disorder  Using nicotine patch and PRN gum     Resting tremor of upper extremities  No acute issues      Asymptomatic COVID19 screening for discharge planning: negative on 12/6/2020     Diet: Room Service  Combination Diet  Regular Diet Adult; Safe Tray - NO utensils  Diet    DVT Prophylaxis: Pneumatic Compression Devices  Valentin Catheter: not present  Code Status: Full Code         Disposition: Expected discharge pending placement    Elva Solano MD  Hospitalist Service  [facility]  Contact information available via [facility] Paging/Directory    ______________________________________________________________________    Interval History   No events overnight. Irritable. Offers no concerns.     Data reviewed today: I reviewed all medications, new labs and imaging results over the last 24 hours. I personally reviewed no images or EKG's today.    Physical Exam   Vital Signs: Temp: 97.8  F (36.6  C) Temp src: Oral BP: 95/70 Pulse: 81   Resp: 16 SpO2: 96 % O2 Device: None (Room air)    Weight: 182 lbs 0 oz  Constitutional: Resting comfortably, NAD  Respiratory: Breathing non-labored.  Neuro: Alert, answers 'yes/no' appropriately, MICHAELS  Psych: Calm and cooperative    Data   No lab results found in last 7 days.      No results found for this or any previous visit (from the past 24 hour(s)).    Medications       aspirin  81 mg Oral Daily     atorvastatin  80 mg Oral At Bedtime     benztropine  0.25 mg Oral BID     docusate sodium  100 mg Oral BID     haloperidol  10 mg Oral TID     levothyroxine  88 mcg Oral Daily     memantine  5 mg Oral Daily     nicotine  1 patch Transdermal Daily     nicotine   Transdermal Q8H     salmeterol  1 puff Inhalation BID     venlafaxine  75 mg Oral Daily with breakfast

## 2021-01-15 PROCEDURE — 250N000013 HC RX MED GY IP 250 OP 250 PS 637: Performed by: HOSPITALIST

## 2021-01-15 PROCEDURE — 250N000013 HC RX MED GY IP 250 OP 250 PS 637: Performed by: INTERNAL MEDICINE

## 2021-01-15 PROCEDURE — 120N000001 HC R&B MED SURG/OB

## 2021-01-15 PROCEDURE — 99231 SBSQ HOSP IP/OBS SF/LOW 25: CPT | Performed by: INTERNAL MEDICINE

## 2021-01-15 PROCEDURE — 250N000013 HC RX MED GY IP 250 OP 250 PS 637: Performed by: PSYCHIATRY & NEUROLOGY

## 2021-01-15 RX ADMIN — VENLAFAXINE HYDROCHLORIDE 75 MG: 75 CAPSULE, EXTENDED RELEASE ORAL at 08:51

## 2021-01-15 RX ADMIN — Medication 0.25 MG: at 08:54

## 2021-01-15 RX ADMIN — LEVOTHYROXINE SODIUM 88 MCG: 88 TABLET ORAL at 08:52

## 2021-01-15 RX ADMIN — DOCUSATE SODIUM 100 MG: 100 CAPSULE, LIQUID FILLED ORAL at 08:51

## 2021-01-15 RX ADMIN — SALMETEROL XINAFOATE 1 PUFF: 50 POWDER, METERED ORAL; RESPIRATORY (INHALATION) at 08:56

## 2021-01-15 RX ADMIN — HALOPERIDOL 10 MG: 5 TABLET ORAL at 08:52

## 2021-01-15 RX ADMIN — HALOPERIDOL 10 MG: 5 TABLET ORAL at 15:04

## 2021-01-15 RX ADMIN — DOCUSATE SODIUM 100 MG: 100 CAPSULE, LIQUID FILLED ORAL at 22:11

## 2021-01-15 RX ADMIN — SALMETEROL XINAFOATE 1 PUFF: 50 POWDER, METERED ORAL; RESPIRATORY (INHALATION) at 22:13

## 2021-01-15 RX ADMIN — ASPIRIN 81 MG: 81 TABLET, COATED ORAL at 08:51

## 2021-01-15 RX ADMIN — MEMANTINE 5 MG: 5 TABLET ORAL at 08:52

## 2021-01-15 RX ADMIN — Medication 0.25 MG: at 22:11

## 2021-01-15 RX ADMIN — NICOTINE 1 PATCH: 7 PATCH, EXTENDED RELEASE TRANSDERMAL at 08:56

## 2021-01-15 RX ADMIN — HALOPERIDOL 10 MG: 5 TABLET ORAL at 22:10

## 2021-01-15 RX ADMIN — ATORVASTATIN CALCIUM 80 MG: 40 TABLET, FILM COATED ORAL at 22:10

## 2021-01-15 ASSESSMENT — ACTIVITIES OF DAILY LIVING (ADL)
ADLS_ACUITY_SCORE: 15

## 2021-01-15 NOTE — PLAN OF CARE
A&O to self. Confused. A little agitated on shift requesting repeatedly to go have a cigarette. Re-direction helped; no need for PRN meds. Denied any pain. Voice hoarse and whispers. Independent in room. Voiding well on shift. Nicotine in place to right deltoid. VS ordered daily. Door alarm in place. Discharge pending.

## 2021-01-15 NOTE — PLAN OF CARE
Patient alert. Oriented to self. Disoriented/confused to place, time, situation. Slightly agitated this evening asking for clothes to go home, PRN ODT zyprexa given and improved. VSS. Denies pain. Up independently in room. Regular diet, good intake. Nicotine patch on right deltoid.

## 2021-01-15 NOTE — PROGRESS NOTES
[facility]    Medicine Progress Note - Hospitalist Service       Date of Admission:  9/9/2020    Assessment & Plan   John Juárez is a 55 year old male with PMHx of schizophrenia, hx of TBI, alcohol use disorder, COPD, hyperlipidemia and hypothyroidism who was admitted on 9/9/2020 for evaluation of aggressive behavior at his group home.  He has been evaluated by Psychiatry and his medical condition remains stable, though his group home is now unwilling to take him back and thus hospitalization has been prolonged awaiting new placement.      Neurocognitive disorder dt hx of TBI and alcohol use disorder  Schizophrenia  Under commitment  Had been residing in group home prior to admission.  Brought to hospital for evaluation of aggressive behaviors. Group home now unwilling to take him back. Has had numerous CODE 21s called this stay. Seen by psych, meds adjusted. Is currently under civil commitment and court hold through [facility] until 7/31/21. Earlier on in stay, psych had recommended geripsych placement but per evaluation on 12/18, no longer felt this was needed and recommended to continue current meds. QTc 428 on EKG on 12/18. Seen again by psych on 1/5/21 and again not felt to need inpatient psych stay.   -- On Haldol 10mg TID, memantine 5 mg daily, venlafaxine 75 mg daily, benztropine 0.25 mg BID  -- Haloperidol, olanzapine, and lorazepam PRN for acute agitation  -- SW following for placement     Baseline Hypotension  BP 90s systolic on average. Asymptomatic. No concerns     Hyperlipidemia  Chronic and stable on atorvastatin     COPD  Chronic and stable on salmeterol, albuterol prn     Hypothyroidism  Chronic and stable on levothyroxine     Tobacco use disorder  Using nicotine patch and PRN gum     Resting tremor of upper extremities  No acute issues      Asymptomatic COVID19 screening for discharge planning: negative on 12/6/2020     Diet: Room Service  Combination Diet  Regular Diet Adult; Safe Tray - NO utensils  Diet    DVT Prophylaxis: Pneumatic Compression Devices  Valentin Catheter: not present  Code Status: Full Code         Disposition: Expected discharge pending placement    Elva Solano MD  Hospitalist Service  [facility]  Contact information available via [facility] Paging/Directory    ______________________________________________________________________    Interval History   No events overnight. Offers no concerns.     Data reviewed today: I reviewed all medications, new labs and imaging results over the last 24 hours. I personally reviewed no images or EKG's today.    Physical Exam   Vital Signs: Temp: 96.1  F (35.6  C) Temp src: Oral BP: 98/67 Pulse: 77   Resp: 16 SpO2: 99 % O2 Device: None (Room air)    Weight: 182 lbs 0 oz  Constitutional: Resting comfortably, NAD  Respiratory: Breathing non-labored.  Neuro: Alert, answers 'yes/no' appropriately, MICHAELS  Psych: Calm and cooperative    Data   No lab results found in last 7 days.      No results found for this or any previous visit (from the past 24 hour(s)).    Medications       aspirin  81 mg Oral Daily     atorvastatin  80 mg Oral At Bedtime     benztropine  0.25 mg Oral BID     docusate sodium  100 mg Oral BID     haloperidol  10 mg Oral TID     levothyroxine  88 mcg Oral Daily     memantine  5 mg Oral Daily     nicotine  1 patch Transdermal Daily     nicotine   Transdermal Q8H     salmeterol  1 puff Inhalation BID     venlafaxine  75 mg Oral Daily with breakfast

## 2021-01-16 PROCEDURE — 250N000013 HC RX MED GY IP 250 OP 250 PS 637: Performed by: INTERNAL MEDICINE

## 2021-01-16 PROCEDURE — 250N000013 HC RX MED GY IP 250 OP 250 PS 637: Performed by: PSYCHIATRY & NEUROLOGY

## 2021-01-16 PROCEDURE — 99232 SBSQ HOSP IP/OBS MODERATE 35: CPT | Performed by: HOSPITALIST

## 2021-01-16 PROCEDURE — 120N000001 HC R&B MED SURG/OB

## 2021-01-16 PROCEDURE — 250N000013 HC RX MED GY IP 250 OP 250 PS 637: Performed by: HOSPITALIST

## 2021-01-16 RX ADMIN — HALOPERIDOL 10 MG: 5 TABLET ORAL at 09:25

## 2021-01-16 RX ADMIN — Medication 0.25 MG: at 21:37

## 2021-01-16 RX ADMIN — HALOPERIDOL 10 MG: 5 TABLET ORAL at 21:37

## 2021-01-16 RX ADMIN — NICOTINE 1 PATCH: 7 PATCH, EXTENDED RELEASE TRANSDERMAL at 09:28

## 2021-01-16 RX ADMIN — DOCUSATE SODIUM 100 MG: 100 CAPSULE, LIQUID FILLED ORAL at 21:37

## 2021-01-16 RX ADMIN — LEVOTHYROXINE SODIUM 88 MCG: 88 TABLET ORAL at 09:24

## 2021-01-16 RX ADMIN — OLANZAPINE 5 MG: 5 TABLET, ORALLY DISINTEGRATING ORAL at 19:54

## 2021-01-16 RX ADMIN — DOCUSATE SODIUM 100 MG: 100 CAPSULE, LIQUID FILLED ORAL at 09:24

## 2021-01-16 RX ADMIN — ACETAMINOPHEN 650 MG: 325 TABLET, FILM COATED ORAL at 06:41

## 2021-01-16 RX ADMIN — VENLAFAXINE HYDROCHLORIDE 75 MG: 75 CAPSULE, EXTENDED RELEASE ORAL at 09:24

## 2021-01-16 RX ADMIN — Medication 0.25 MG: at 09:24

## 2021-01-16 RX ADMIN — MEMANTINE 5 MG: 5 TABLET ORAL at 09:24

## 2021-01-16 RX ADMIN — ASPIRIN 81 MG: 81 TABLET, COATED ORAL at 09:24

## 2021-01-16 RX ADMIN — ATORVASTATIN CALCIUM 80 MG: 40 TABLET, FILM COATED ORAL at 21:37

## 2021-01-16 RX ADMIN — HALOPERIDOL 10 MG: 5 TABLET ORAL at 15:13

## 2021-01-16 ASSESSMENT — ACTIVITIES OF DAILY LIVING (ADL)
ADLS_ACUITY_SCORE: 15

## 2021-01-16 NOTE — PLAN OF CARE
Pt is A and O to self only. Easily redirectable. Able to make needs known. No agitation noted today. VSS on RA. Denies pain or nausea. Voice continues to be horse. Ind in the room. Regular diet, good appetite. Continent of both. Adequate output. Nicotine patch to L deltoid. Door alarm in place. Discharge pending placement.

## 2021-01-16 NOTE — PROGRESS NOTES
[facility]    Medicine Progress Note - Hospitalist Service       Date of Admission:  9/9/2020  Assessment & Plan       John Juárez is a 55 year old male with PMHx of schizophrenia, hx of TBI, alcohol use disorder, COPD, hyperlipidemia and hypothyroidism who was admitted on 9/9/2020 for evaluation of aggressive behavior at his group home.  He has been evaluated by Psychiatry and his medical condition remains stable, though his group home is now unwilling to take him back and thus hospitalization has been prolonged awaiting new placement.      Neurocognitive disorder dt hx of TBI and alcohol use disorder  Schizophrenia  Under commitment  Had been residing in group home prior to admission.  Brought to hospital for evaluation of aggressive behaviors. Group home now unwilling to take him back. Has had numerous CODE 21s called this stay. Seen by psych, meds adjusted. Is currently under civil commitment and court hold through [facility] until 7/31/21. Earlier on in stay, psych had recommended geripsych placement but per evaluation on 12/18, no longer felt this was needed and recommended to continue current meds. QTc 428 on EKG on 12/18. Seen again by psych on 1/5/21 and again not felt to need inpatient psych stay.   - On Haldol 10mg TID, memantine 5 mg daily, venlafaxine 75 mg daily, benztropine 0.25 mg BID  - Haloperidol, olanzapine, and lorazepam PRN for acute agitation  - SW following for placement     Baseline Hypotension  BP 90s systolic on average. Asymptomatic. No concerns     Hyperlipidemia  Chronic and stable on atorvastatin     COPD  Chronic and stable on salmeterol, albuterol prn     Hypothyroidism  Chronic and stable on levothyroxine     Tobacco use disorder  Using nicotine patch and PRN gum     Resting tremor of upper extremities  No acute issues      Asymptomatic COVID19 screening for discharge planning: negative on 12/6/2020      Diet: Room Service  Combination Diet  Regular Diet Adult; Safe Tray - NO utensils  Diet    DVT Prophylaxis: Pneumatic Compression Devices  Valentin Catheter: not present  Code Status: Full Code           Disposition Plan   Expected discharge: pending placement  Entered: Sanjay Cantu MD 01/16/2021, 8:51 AM       The patient's care was discussed with the Bedside Nurse and Patient.    Sanjay Cantu MD  Hospitalist Service  [facility]  Contact information available via [facility] Paging/Directory    ______________________________________________________________________    Interval History   No new issues. Continue with plan - awaiting placement. Resting/watching TV comfortably after taking a shower.     Data reviewed today: I reviewed all medications, new labs and imaging results over the last 24 hours. I personally reviewed no images or EKG's today.    Physical Exam   Vital Signs:     BP: 92/56            HR 70s Resp 18  Weight: 182 lbs 0 oz    GEN: Resting comfortably, NAD  RESP: Breathing non-labored.  ABDO: non distended, nontender on palpation  EXT: no edema noted  NEURO: Alert, answers 'yes/no' appropriately, MICHAELS  PSYCH: Calm and cooperative      Data   NNL

## 2021-01-16 NOTE — PLAN OF CARE
VS deferred per order. Denies pain. LS clear. BS present &  passing gas. Cooperative this shift, mostly sleeping. Discharge pending placement

## 2021-01-17 PROCEDURE — 250N000013 HC RX MED GY IP 250 OP 250 PS 637: Performed by: INTERNAL MEDICINE

## 2021-01-17 PROCEDURE — 99232 SBSQ HOSP IP/OBS MODERATE 35: CPT | Performed by: HOSPITALIST

## 2021-01-17 PROCEDURE — 250N000013 HC RX MED GY IP 250 OP 250 PS 637: Performed by: HOSPITALIST

## 2021-01-17 PROCEDURE — 250N000013 HC RX MED GY IP 250 OP 250 PS 637: Performed by: PSYCHIATRY & NEUROLOGY

## 2021-01-17 PROCEDURE — 120N000001 HC R&B MED SURG/OB

## 2021-01-17 RX ADMIN — HALOPERIDOL 10 MG: 5 TABLET ORAL at 09:04

## 2021-01-17 RX ADMIN — MEMANTINE 5 MG: 5 TABLET ORAL at 09:04

## 2021-01-17 RX ADMIN — NICOTINE 1 PATCH: 7 PATCH, EXTENDED RELEASE TRANSDERMAL at 09:07

## 2021-01-17 RX ADMIN — HALOPERIDOL 10 MG: 5 TABLET ORAL at 15:13

## 2021-01-17 RX ADMIN — LEVOTHYROXINE SODIUM 88 MCG: 88 TABLET ORAL at 09:04

## 2021-01-17 RX ADMIN — Medication 0.25 MG: at 09:04

## 2021-01-17 RX ADMIN — DOCUSATE SODIUM 100 MG: 100 CAPSULE, LIQUID FILLED ORAL at 20:48

## 2021-01-17 RX ADMIN — ATORVASTATIN CALCIUM 80 MG: 40 TABLET, FILM COATED ORAL at 20:47

## 2021-01-17 RX ADMIN — OLANZAPINE 5 MG: 5 TABLET, ORALLY DISINTEGRATING ORAL at 19:01

## 2021-01-17 RX ADMIN — HALOPERIDOL 10 MG: 5 TABLET ORAL at 20:48

## 2021-01-17 RX ADMIN — DOCUSATE SODIUM 100 MG: 100 CAPSULE, LIQUID FILLED ORAL at 09:04

## 2021-01-17 RX ADMIN — Medication 0.25 MG: at 20:47

## 2021-01-17 RX ADMIN — ASPIRIN 81 MG: 81 TABLET, COATED ORAL at 09:04

## 2021-01-17 RX ADMIN — OLANZAPINE 5 MG: 5 TABLET, ORALLY DISINTEGRATING ORAL at 09:58

## 2021-01-17 RX ADMIN — SALMETEROL XINAFOATE 1 PUFF: 50 POWDER, METERED ORAL; RESPIRATORY (INHALATION) at 20:48

## 2021-01-17 RX ADMIN — VENLAFAXINE HYDROCHLORIDE 75 MG: 75 CAPSULE, EXTENDED RELEASE ORAL at 09:04

## 2021-01-17 RX ADMIN — SALMETEROL XINAFOATE 1 PUFF: 50 POWDER, METERED ORAL; RESPIRATORY (INHALATION) at 09:05

## 2021-01-17 ASSESSMENT — ACTIVITIES OF DAILY LIVING (ADL)
ADLS_ACUITY_SCORE: 15

## 2021-01-17 NOTE — PROGRESS NOTES
[facility]    Medicine Progress Note - Hospitalist Service       Date of Admission:  9/9/2020  Assessment & Plan       John Juárez is a 55 year old male with PMHx of schizophrenia, hx of TBI, alcohol use disorder, COPD, hyperlipidemia and hypothyroidism who was admitted on 9/9/2020 for evaluation of aggressive behavior at his group home.  He has been evaluated by Psychiatry and his medical condition remains stable, though his group home is now unwilling to take him back and thus hospitalization has been prolonged awaiting new placement.      Neurocognitive disorder dt hx of TBI and alcohol use disorder  Schizophrenia  Under commitment  Had been residing in group home prior to admission.  Brought to hospital for evaluation of aggressive behaviors. Group home now unwilling to take him back. Has had numerous CODE 21s called this stay. Seen by psych, meds adjusted. Is currently under civil commitment and court hold through [facility] until 7/31/21. Earlier on in stay, psych had recommended geripsych placement but per evaluation on 12/18, no longer felt this was needed and recommended to continue current meds. QTc 428 on EKG on 12/18. Seen again by psych on 1/5/21 and again not felt to need inpatient psych stay.   - On Haldol 10mg TID, memantine 5 mg daily, venlafaxine 75 mg daily, benztropine 0.25 mg BID  - Haloperidol, olanzapine, and lorazepam PRN for acute agitation  - SW following for placement     Baseline Hypotension  BP 90s systolic on average. Asymptomatic. No concerns     Hyperlipidemia  Chronic and stable on atorvastatin     COPD  Chronic and stable on salmeterol, albuterol prn     Hypothyroidism  Chronic and stable on levothyroxine     Tobacco use disorder  Using nicotine patch and PRN gum     Resting tremor of upper extremities  No acute issues      Asymptomatic COVID19 screening for discharge planning: negative on 12/6/2020      Diet: Room Service  Combination Diet  Regular Diet Adult; Safe Tray - NO utensils  Diet    DVT Prophylaxis: Pneumatic Compression Devices  Valentin Catheter: not present  Code Status: Full Code           Disposition Plan   Expected discharge:  Pending placement  Entered: Sanjay Cantu MD 01/17/2021, 10:48 AM       The patient's care was discussed with the Bedside Nurse and Patient.    Sanjay Cantu MD  Hospitalist Service  [facility]  Contact information available via [facility] Paging/Directory    ______________________________________________________________________    Interval History   No new issues.  Denies pain or sob. Was agitated earlier per RN staff report - calm now.    Data reviewed today: I reviewed all medications, new labs and imaging results over the last 24 hours. I personally reviewed no images or EKG's today.    Physical Exam   Vital Signs: Temp: 96.6  F (35.9  C) Temp src: Oral BP: 95/67 Pulse: 68   Resp: 14 SpO2: 98 % O2 Device: None (Room air)    Weight: 182 lbs 0 oz    GEN: Resting comfortably and watching TV, NAD  RESP: Breathing non-labored.  ABDO: non distended, nontender on palpation  EXT: no edema noted  NEURO: Alert, answers 'yes/no' appropriately, MICHAELS  PSYCH: Calm and cooperative    Data   NNL

## 2021-01-17 NOTE — PLAN OF CARE
VS deferred per order. Denies pain. LS clear. BS present &  passing gas. Slight agitation in PM, PRN Zyprexa effective. Rested well overnight.

## 2021-01-17 NOTE — PLAN OF CARE
Pt is A and O to self only.  Easily redirectable.  No agitation today.  In stable conditions. VSS on RA. Denies pain or nausea. Ind in the room. Regular diet, good appetite. Continent of both. Adequate output. Nicotine patch to R deltoid.  Showered today. Door alarm in place. Discharge pending placement.

## 2021-01-18 PROCEDURE — 250N000013 HC RX MED GY IP 250 OP 250 PS 637: Performed by: PSYCHIATRY & NEUROLOGY

## 2021-01-18 PROCEDURE — 99232 SBSQ HOSP IP/OBS MODERATE 35: CPT | Performed by: HOSPITALIST

## 2021-01-18 PROCEDURE — 120N000001 HC R&B MED SURG/OB

## 2021-01-18 PROCEDURE — 250N000013 HC RX MED GY IP 250 OP 250 PS 637: Performed by: INTERNAL MEDICINE

## 2021-01-18 PROCEDURE — 250N000013 HC RX MED GY IP 250 OP 250 PS 637: Performed by: HOSPITALIST

## 2021-01-18 RX ADMIN — NICOTINE 1 PATCH: 7 PATCH, EXTENDED RELEASE TRANSDERMAL at 09:33

## 2021-01-18 RX ADMIN — VENLAFAXINE HYDROCHLORIDE 75 MG: 75 CAPSULE, EXTENDED RELEASE ORAL at 09:34

## 2021-01-18 RX ADMIN — HALOPERIDOL 10 MG: 5 TABLET ORAL at 09:34

## 2021-01-18 RX ADMIN — HALOPERIDOL 10 MG: 5 TABLET ORAL at 21:20

## 2021-01-18 RX ADMIN — Medication 0.25 MG: at 21:19

## 2021-01-18 RX ADMIN — HALOPERIDOL 10 MG: 5 TABLET ORAL at 16:43

## 2021-01-18 RX ADMIN — LEVOTHYROXINE SODIUM 88 MCG: 88 TABLET ORAL at 09:33

## 2021-01-18 RX ADMIN — ASPIRIN 81 MG: 81 TABLET, COATED ORAL at 09:34

## 2021-01-18 RX ADMIN — ATORVASTATIN CALCIUM 80 MG: 40 TABLET, FILM COATED ORAL at 21:19

## 2021-01-18 RX ADMIN — Medication 0.25 MG: at 09:34

## 2021-01-18 RX ADMIN — MEMANTINE 5 MG: 5 TABLET ORAL at 09:34

## 2021-01-18 RX ADMIN — OLANZAPINE 5 MG: 5 TABLET, ORALLY DISINTEGRATING ORAL at 13:15

## 2021-01-18 RX ADMIN — SALMETEROL XINAFOATE 1 PUFF: 50 POWDER, METERED ORAL; RESPIRATORY (INHALATION) at 09:35

## 2021-01-18 RX ADMIN — DOCUSATE SODIUM 100 MG: 100 CAPSULE, LIQUID FILLED ORAL at 21:19

## 2021-01-18 ASSESSMENT — ACTIVITIES OF DAILY LIVING (ADL)
ADLS_ACUITY_SCORE: 15

## 2021-01-18 NOTE — PLAN OF CARE
Pt is A and O to self only. Multiple episodes of agitation today: slamming doors, and eloping to North side staircase (7th floor). PRN Zyprexa given and effective. VSS on RA, and unchanged from previous shift.  Lungs clear. Bowels active. Denies pain or nausea. Ind in the room. Regular diet, good appetite. Continent of both. Adequate output. Nicotine patch to L deltoid.  Door alarm in place. Discharge pending placement.

## 2021-01-18 NOTE — PLAN OF CARE
"A&O x1-2, disoriented to place and situation. Denies pain. LS clear. BS present, passing gas. Up independently, door alarm in place. Slight agitation in PM, insisted his \"ride was here to get him.\" HS meds given along with snacks, pt since has been resting in room. Continues to need placement.  "

## 2021-01-18 NOTE — PLAN OF CARE
Pt doing well today.  Up in room ind.  Eating/drinking well.  Zyprexa given once PRN this shift, pt slightly anxious.  Awaiting placement.

## 2021-01-19 PROCEDURE — 120N000001 HC R&B MED SURG/OB

## 2021-01-19 PROCEDURE — 250N000013 HC RX MED GY IP 250 OP 250 PS 637: Performed by: INTERNAL MEDICINE

## 2021-01-19 PROCEDURE — 250N000013 HC RX MED GY IP 250 OP 250 PS 637: Performed by: HOSPITALIST

## 2021-01-19 PROCEDURE — 99232 SBSQ HOSP IP/OBS MODERATE 35: CPT | Performed by: HOSPITALIST

## 2021-01-19 PROCEDURE — 250N000013 HC RX MED GY IP 250 OP 250 PS 637: Performed by: PSYCHIATRY & NEUROLOGY

## 2021-01-19 RX ADMIN — Medication 0.25 MG: at 08:20

## 2021-01-19 RX ADMIN — NICOTINE 1 PATCH: 7 PATCH, EXTENDED RELEASE TRANSDERMAL at 08:20

## 2021-01-19 RX ADMIN — ATORVASTATIN CALCIUM 80 MG: 40 TABLET, FILM COATED ORAL at 20:48

## 2021-01-19 RX ADMIN — ASPIRIN 81 MG: 81 TABLET, COATED ORAL at 08:20

## 2021-01-19 RX ADMIN — MEMANTINE 5 MG: 5 TABLET ORAL at 08:20

## 2021-01-19 RX ADMIN — Medication 0.25 MG: at 20:48

## 2021-01-19 RX ADMIN — OLANZAPINE 5 MG: 5 TABLET, ORALLY DISINTEGRATING ORAL at 12:05

## 2021-01-19 RX ADMIN — DOCUSATE SODIUM 100 MG: 100 CAPSULE, LIQUID FILLED ORAL at 20:48

## 2021-01-19 RX ADMIN — HALOPERIDOL 10 MG: 5 TABLET ORAL at 16:26

## 2021-01-19 RX ADMIN — LEVOTHYROXINE SODIUM 88 MCG: 88 TABLET ORAL at 08:20

## 2021-01-19 RX ADMIN — SALMETEROL XINAFOATE 1 PUFF: 50 POWDER, METERED ORAL; RESPIRATORY (INHALATION) at 08:21

## 2021-01-19 RX ADMIN — HALOPERIDOL 10 MG: 5 TABLET ORAL at 21:32

## 2021-01-19 RX ADMIN — HALOPERIDOL 10 MG: 5 TABLET ORAL at 08:20

## 2021-01-19 RX ADMIN — VENLAFAXINE HYDROCHLORIDE 75 MG: 75 CAPSULE, EXTENDED RELEASE ORAL at 08:20

## 2021-01-19 RX ADMIN — SALMETEROL XINAFOATE 1 PUFF: 50 POWDER, METERED ORAL; RESPIRATORY (INHALATION) at 20:48

## 2021-01-19 ASSESSMENT — ACTIVITIES OF DAILY LIVING (ADL)
ADLS_ACUITY_SCORE: 15

## 2021-01-19 NOTE — PLAN OF CARE
A/O x2, disoriented to place and situation, flat affect, speech whispers and horse. AVSS ex soft BP, on RA. Denies pain. LS clear. Numbness/tingling BLE, baseline per patient. Up independently in room, door alarm activated. Regular diet, good appetite. Discharge pending placement, will continue to monitor.

## 2021-01-19 NOTE — PLAN OF CARE
A&Ox3, disoriented to sit. VSS on RA. Denies pain, N/V. Up ind, door alarm on. Sleeping for most of shift. Nicotine patch to R shoulder. Regular diet, tolerating well. Awaiting placement.

## 2021-01-19 NOTE — PLAN OF CARE
Pt confused as to where he was this am, came out of room stating 'where am I?'.  Up ind in room.  One ODT dose of zyprexa given PRN.  Door alarm activated.  Eating/drinking well.  Uses bathroom independently.  Awaiting placement.

## 2021-01-19 NOTE — PROGRESS NOTES
[facility]    Medicine Progress Note - Hospitalist Service       Date of Admission:  9/9/2020  Assessment & Plan       John Juárez is a 55 year old male with PMHx of schizophrenia, hx of TBI, alcohol use disorder, COPD, hyperlipidemia and hypothyroidism who was admitted on 9/9/2020 for evaluation of aggressive behavior at his group home.  He has been evaluated by Psychiatry and his medical condition remains stable, though his group home is now unwilling to take him back and thus hospitalization has been prolonged awaiting new placement.      Neurocognitive disorder dt hx of TBI and alcohol use disorder  Schizophrenia  Under commitment  Had been residing in group home prior to admission.  Brought to hospital for evaluation of aggressive behaviors. Group home now unwilling to take him back. Has had numerous CODE 21s called this stay. Seen by psych, meds adjusted. Is currently under civil commitment and court hold through [facility] until 7/31/21. Earlier on in stay, psych had recommended geripsych placement but per evaluation on 12/18, no longer felt this was needed and recommended to continue current meds. QTc 428 on EKG on 12/18. Seen again by psych on 1/5/21 and again not felt to need inpatient psych stay.   - On Haldol 10mg TID, memantine 5 mg daily, venlafaxine 75 mg daily, benztropine 0.25 mg BID  - Haloperidol, olanzapine, and lorazepam PRN for acute agitation  - Door alarm in place and necessary as patient has attempted elopement as recent as 1/16  - SW following for placement     Baseline Hypotension  Systolic BP generally around 90s-100s . Asymptomatic. No concerns.     Dyslipidemia  LDL 43, HDL 32 Jan 2020.   - continue atorvastatin     COPD  Chronic and stable on RA.   - continue salmeterol, albuterol prn     Hypothyroidism  TSH 3.18 Sept 2020.   - continue levothyroxine     Tobacco use disorder  Nicotine patch and gum PRN.     Resting tremor of upper extremities  No  acute issues      Asymptomatic COVID19 screening   - for discharge planning: negative on 12/6/2020        Diet: Room Service  Combination Diet Regular Diet Adult; Safe Tray - NO utensils  Diet    DVT Prophylaxis: Pneumatic Compression Devices and Ambulate every shift  Valentin Catheter: not present  Code Status: Full Code           Disposition Plan   Expected discharge: pending placement  Entered: Sanjay Cantu MD 01/19/2021, 11:40 AM       The patient's care was discussed with the Bedside Nurse and Patient. And CT/SW.    Sanjay Cantu MD  Hospitalist Service  [facility]  Contact information available via [facility] Paging/Directory    ______________________________________________________________________    Interval History   Seen and examined. No new complaints. Calmly watching TV. Denies sob, abdominal pain, or chest pain. Awaiting acceptance, still.    Data reviewed today: I reviewed all medications, new labs and imaging results over the last 24 hours. I personally reviewed no images or EKG's today.    Physical Exam   Vital Signs: Temp: 95.1  F (35.1  C) Temp src: Axillary BP: 98/72 Pulse: 70   Resp: 14 SpO2: 98 % O2 Device: None (Room air)    Weight: 182 lbs 0 oz    Gen: NAD, pleasant/flat  HEENT: Normocephalic, EOMI, MMM  Resp: no crackles,  no wheezes, no increased work of resp  CV: S1S2 heard, reg rhythm, reg rate, no pitting pedal edema  Abdo: soft, nontender, nondistended, bowel sounds present  Ext: calves nontender, well perfused  Neuro: AAOxself, CN grossly intact, no facial asymmetry      Data   NNL

## 2021-01-19 NOTE — PROGRESS NOTES
Care Management Follow Up    Length of Stay (days): 122    Expected Discharge Date: (unable to give an estimated d/c date)     Concerns to be Addressed: patient refuses services, discharge planning     Patient plan of care discussed at interdisciplinary rounds: Yes    Anticipated Discharge Disposition: Other (Comments)     Anticipated Discharge Services: None  Anticipated Discharge DME: None    Patient/family educated on Medicare website which has current facility and service quality ratings: (not applicable)  Education Provided on the Discharge Plan:    Patient/Family in Agreement with the Plan: no    Referrals Placed by CM/SW: Post Acute Facilities  Private pay costs discussed: Not applicable    Additional Information:  Sent an email today to  Kurtis Chapin, Behavioral  for patient, asking if he has any update as to when a [facility] ([facility]Facility will have a vacancy for patient.      ASIM CastilloSW

## 2021-01-19 NOTE — PLAN OF CARE
No change in patient condition overnight. Pt is A+Ox4, sometimes disoriented to situation. Flat affect and antisocial behavior present. Pt might greatly benefit from inpatient psych setting instead of a medical floor. Door alarm in place. Pt calm and cooperative with staff.

## 2021-01-20 PROCEDURE — 250N000013 HC RX MED GY IP 250 OP 250 PS 637: Performed by: INTERNAL MEDICINE

## 2021-01-20 PROCEDURE — 250N000013 HC RX MED GY IP 250 OP 250 PS 637: Performed by: PSYCHIATRY & NEUROLOGY

## 2021-01-20 PROCEDURE — 99232 SBSQ HOSP IP/OBS MODERATE 35: CPT | Performed by: HOSPITALIST

## 2021-01-20 PROCEDURE — 250N000013 HC RX MED GY IP 250 OP 250 PS 637: Performed by: HOSPITALIST

## 2021-01-20 PROCEDURE — 120N000001 HC R&B MED SURG/OB

## 2021-01-20 RX ADMIN — OLANZAPINE 5 MG: 5 TABLET, ORALLY DISINTEGRATING ORAL at 12:03

## 2021-01-20 RX ADMIN — DOCUSATE SODIUM 100 MG: 100 CAPSULE, LIQUID FILLED ORAL at 20:57

## 2021-01-20 RX ADMIN — SALMETEROL XINAFOATE 1 PUFF: 50 POWDER, METERED ORAL; RESPIRATORY (INHALATION) at 20:57

## 2021-01-20 RX ADMIN — ATORVASTATIN CALCIUM 80 MG: 40 TABLET, FILM COATED ORAL at 20:57

## 2021-01-20 RX ADMIN — NICOTINE 1 PATCH: 7 PATCH, EXTENDED RELEASE TRANSDERMAL at 09:11

## 2021-01-20 RX ADMIN — MEMANTINE 5 MG: 5 TABLET ORAL at 09:11

## 2021-01-20 RX ADMIN — HALOPERIDOL 10 MG: 5 TABLET ORAL at 16:28

## 2021-01-20 RX ADMIN — SALMETEROL XINAFOATE 1 PUFF: 50 POWDER, METERED ORAL; RESPIRATORY (INHALATION) at 09:12

## 2021-01-20 RX ADMIN — HALOPERIDOL 10 MG: 5 TABLET ORAL at 09:11

## 2021-01-20 RX ADMIN — ASPIRIN 81 MG: 81 TABLET, COATED ORAL at 09:10

## 2021-01-20 RX ADMIN — LEVOTHYROXINE SODIUM 88 MCG: 88 TABLET ORAL at 09:11

## 2021-01-20 RX ADMIN — Medication 0.25 MG: at 20:57

## 2021-01-20 RX ADMIN — VENLAFAXINE HYDROCHLORIDE 75 MG: 75 CAPSULE, EXTENDED RELEASE ORAL at 09:11

## 2021-01-20 RX ADMIN — Medication 0.25 MG: at 09:11

## 2021-01-20 RX ADMIN — HALOPERIDOL 10 MG: 5 TABLET ORAL at 20:56

## 2021-01-20 ASSESSMENT — ACTIVITIES OF DAILY LIVING (ADL)
ADLS_ACUITY_SCORE: 15

## 2021-01-20 NOTE — PROGRESS NOTES
[facility]    Medicine Progress Note - Hospitalist Service       Date of Admission:  9/9/2020  Assessment & Plan       John Juárez is a 55 year old male with PMHx of schizophrenia, hx of TBI, alcohol use disorder, COPD, hyperlipidemia and hypothyroidism who was admitted on 9/9/2020 for evaluation of aggressive behavior at his group home.  He has been evaluated by Psychiatry and his medical condition remains stable, though his group home is now unwilling to take him back and thus hospitalization has been prolonged awaiting new placement.      Neurocognitive disorder dt hx of TBI and alcohol use disorder  Schizophrenia  Under commitment  Had been residing in group home prior to admission.  Brought to hospital for evaluation of aggressive behaviors. Group home now unwilling to take him back. Has had numerous CODE 21s called this stay. Seen by psych, meds adjusted. Is currently under civil commitment and court hold through [facility] until 7/31/21. Earlier on in stay, psych had recommended geripsych placement but per evaluation on 12/18, no longer felt this was needed and recommended to continue current meds. QTc 428 on EKG on 12/18. Seen again by psych on 1/5/21 and again not felt to need inpatient psych stay.   - On Haldol 10mg TID, memantine 5 mg daily, venlafaxine 75 mg daily, benztropine 0.25 mg BID  - Haloperidol, olanzapine, and lorazepam PRN for acute agitation  - Door alarm in place and necessary as patient has attempted elopement as recent as 1/16  - SW following for placement     Baseline Hypotension  Systolic BP generally around 90s-100s . Asymptomatic. No concerns.     Dyslipidemia  LDL 43, HDL 32 Jan 2020.   - continue atorvastatin     COPD  Chronic and stable on RA.   - continue salmeterol, albuterol prn     Hypothyroidism  TSH 3.18 Sept 2020.   - continue levothyroxine     Tobacco use disorder  Nicotine patch and gum PRN.     Resting tremor of upper extremities  No  acute issues      Asymptomatic COVID19 screening   - for discharge planning: negative on 12/6/2020      Diet: Room Service  Diet  Combination Diet Regular Diet Adult; Safe Tray - with utensils    DVT Prophylaxis: Pneumatic Compression Devices and Ambulate every shift  Avlentin Catheter: not present  Code Status: Full Code           Disposition Plan   Expected discharge: pending placement  Entered: Sanjay Cantu MD 01/20/2021, 12:04 PM       The patient's care was discussed with the Bedside Nurse and Patient.    Sanjay Cantu MD  Hospitalist Service  [facility]  Contact information available via [facility] Paging/Directory    ______________________________________________________________________    Interval History   Seen and examined late morning.  No new complaints - denies sob or pain. Calm and cooperative. Placement pending.    Data reviewed today: I reviewed all medications, new labs and imaging results over the last 24 hours. I personally reviewed no images or EKG's today.    Physical Exam   Vital Signs: Temp: 95.5  F (35.3  C) Temp src: Oral BP: 104/73 Pulse: 84   Resp: 16 SpO2: 97 % O2 Device: None (Room air)    Weight: 182 lbs 0 oz    Gen: NAD, calm  HEENT: EOMI, MMM  Resp: no crackles,  no wheezes, no increased work of resp  CV: S1S2 heard, reg rhythm, reg rate, no pitting pedal edema  Abdo: soft, nontender, nondistended, bowel sounds present  Ext: calves nontender, well perfused  Neuro: awake and alert, moving all extremities  Calm, flat affect      Data   No lab results found in last 7 days.    No results found for this or any previous visit (from the past 24 hour(s)).

## 2021-01-20 NOTE — PLAN OF CARE
A&Ox3 (disoriented to situation); calm and cooperative throughout the night, no PRN meds needed.  Independent.  Door alarm activated.  Tolerating regular diet without problems.  No IV access.  Discharge pending placement.

## 2021-01-20 NOTE — PLAN OF CARE
Pt alert to self.  Zyprexa given once PRN this shift.  Pt repeatedly asking his room number, swearing at times.  Up in room ind.  Eating/drinking well.  Awaiting placement.

## 2021-01-21 PROCEDURE — 250N000013 HC RX MED GY IP 250 OP 250 PS 637: Performed by: INTERNAL MEDICINE

## 2021-01-21 PROCEDURE — 250N000013 HC RX MED GY IP 250 OP 250 PS 637: Performed by: HOSPITALIST

## 2021-01-21 PROCEDURE — 99231 SBSQ HOSP IP/OBS SF/LOW 25: CPT | Performed by: HOSPITALIST

## 2021-01-21 PROCEDURE — 120N000001 HC R&B MED SURG/OB

## 2021-01-21 PROCEDURE — 250N000013 HC RX MED GY IP 250 OP 250 PS 637: Performed by: PSYCHIATRY & NEUROLOGY

## 2021-01-21 RX ADMIN — ASPIRIN 81 MG: 81 TABLET, COATED ORAL at 09:41

## 2021-01-21 RX ADMIN — HALOPERIDOL 10 MG: 5 TABLET ORAL at 09:41

## 2021-01-21 RX ADMIN — LEVOTHYROXINE SODIUM 88 MCG: 88 TABLET ORAL at 09:40

## 2021-01-21 RX ADMIN — ATORVASTATIN CALCIUM 80 MG: 40 TABLET, FILM COATED ORAL at 21:04

## 2021-01-21 RX ADMIN — VENLAFAXINE HYDROCHLORIDE 75 MG: 75 CAPSULE, EXTENDED RELEASE ORAL at 09:41

## 2021-01-21 RX ADMIN — HALOPERIDOL 10 MG: 5 TABLET ORAL at 22:45

## 2021-01-21 RX ADMIN — Medication 0.25 MG: at 09:40

## 2021-01-21 RX ADMIN — NICOTINE 1 PATCH: 7 PATCH, EXTENDED RELEASE TRANSDERMAL at 09:39

## 2021-01-21 RX ADMIN — MEMANTINE 5 MG: 5 TABLET ORAL at 09:41

## 2021-01-21 RX ADMIN — SALMETEROL XINAFOATE 1 PUFF: 50 POWDER, METERED ORAL; RESPIRATORY (INHALATION) at 09:39

## 2021-01-21 RX ADMIN — DOCUSATE SODIUM 100 MG: 100 CAPSULE, LIQUID FILLED ORAL at 09:40

## 2021-01-21 RX ADMIN — HALOPERIDOL 10 MG: 5 TABLET ORAL at 17:24

## 2021-01-21 RX ADMIN — SALMETEROL XINAFOATE 1 PUFF: 50 POWDER, METERED ORAL; RESPIRATORY (INHALATION) at 21:08

## 2021-01-21 RX ADMIN — DOCUSATE SODIUM 100 MG: 100 CAPSULE, LIQUID FILLED ORAL at 21:04

## 2021-01-21 RX ADMIN — Medication 0.25 MG: at 21:04

## 2021-01-21 ASSESSMENT — ACTIVITIES OF DAILY LIVING (ADL)
ADLS_ACUITY_SCORE: 15

## 2021-01-21 NOTE — PROVIDER NOTIFICATION
MD Notification    Notified Person: MD Cantu    Notified Person Name:    Notification Date/Time: 1/21/2021 1:53 PM    Notification Interaction:    Purpose of Notification: pt with new oozing pustule on R mid back. Oozing sm amount of blood. Sl painful. Pt unaware of what it is.     Orders Received: clean, cover, monitor   Comments:

## 2021-01-21 NOTE — PLAN OF CARE
Alert to self only. Slept well most of shift. Denies pain and N/V. Independent in room. Door alarm in place. Pt called 911 once this shift, redirected by security. Discharge waiting on placement.

## 2021-01-21 NOTE — PROGRESS NOTES
[facility]    Medicine Progress Note - Hospitalist Service       Date of Admission:  9/9/2020  Assessment & Plan       John Juárez is a 55 year old male with PMHx of schizophrenia, hx of TBI, alcohol use disorder, COPD, hyperlipidemia and hypothyroidism who was admitted on 9/9/2020 for evaluation of aggressive behavior at his group home.  He has been evaluated by Psychiatry and his medical condition remains stable, though his group home is now unwilling to take him back and thus hospitalization has been prolonged awaiting new placement.      Neurocognitive disorder dt hx of TBI and alcohol use disorder  Schizophrenia  Under commitment  Had been residing in group home prior to admission.  Brought to hospital for evaluation of aggressive behaviors. Group home now unwilling to take him back. Has had numerous CODE 21s called this stay. Seen by psych, meds adjusted. Is currently under civil commitment and court hold through [facility] until 7/31/21. Earlier on in stay, psych had recommended geripsych placement but per evaluation on 12/18, no longer felt this was needed and recommended to continue current meds. QTc 428 on EKG on 12/18. Seen again by psych on 1/5/21 and again not felt to need inpatient psych stay.   - On Haldol 10mg TID, memantine 5 mg daily, venlafaxine 75 mg daily, benztropine 0.25 mg BID  - Haloperidol, olanzapine, and lorazepam PRN for acute agitation  - Door alarm in place and necessary as patient has attempted elopement as recent as 1/16  - SW following for placement     Baseline Hypotension  Systolic BP generally around 90s-100s . Asymptomatic. No concerns.     Dyslipidemia  LDL 43, HDL 32 Jan 2020.   - continue atorvastatin     COPD  Chronic and stable on RA.   - continue salmeterol, albuterol prn     Hypothyroidism  TSH 3.18 Sept 2020.   - continue levothyroxine     Tobacco use disorder  Nicotine patch and gum PRN.     Resting tremor of upper extremities  No  acute issues      Asymptomatic COVID19 screening   - for discharge planning: negative on 12/6/2020      Diet: Room Service  Diet  Combination Diet Regular Diet Adult; Safe Tray - with utensils    DVT Prophylaxis: Pneumatic Compression Devices  Valentin Catheter: not present  Code Status: Full Code           Disposition Plan   Expected discharge: Pending placement  Entered: Sanjay Cantu MD 01/21/2021, 2:21 PM       The patient's care was discussed with the Bedside Nurse and Patient.    Sanjay Cantu MD  Hospitalist Service  [facility]  Contact information available via [facility] Paging/Directory    ______________________________________________________________________    Interval History   Patient seen and examined.  No new complaints or issues.  Patient denies shortness of breath or pain.    Data reviewed today: I reviewed all medications, new labs and imaging results over the last 24 hours. I personally reviewed no images or EKG's today.    Physical Exam   Vital Signs: Temp: 95.5  F (35.3  C) Temp src: Oral BP: 103/73 Pulse: 75   Resp: 16 SpO2: 99 % O2 Device: None (Room air)    Weight: 182 lbs 0 oz    Gen: NAD, calm  HEENT: EOMI, MMM  Resp: no crackles,  no wheezes, no increased work of resp  CV: S1S2 heard, reg rhythm, reg rate, no pitting pedal edema  Abdo: soft, nontender, nondistended, bowel sounds present  Ext: calves nontender, well perfused  Neuro: awake and alert, moving all extremities  Calm, flat affect    Data   No new labs

## 2021-01-21 NOTE — PLAN OF CARE
Denies pain except for r back oozing pustule. Covered with mepilex, showered. Independent in room. Door alarm in place. Pt called 911 once on noc shift, redirected by security. NO PRNs needed. Discharge waiting on placement.

## 2021-01-21 NOTE — PLAN OF CARE
[id number]: Uneventful shift. Pt was calm and cooperative this shift. Denies pain. R back wound mepilex CDI. Nicotine patch to right deltoid. Independent in room. Door alarm in place. Discharge plan pending placement.

## 2021-01-22 PROCEDURE — 250N000013 HC RX MED GY IP 250 OP 250 PS 637: Performed by: INTERNAL MEDICINE

## 2021-01-22 PROCEDURE — 250N000013 HC RX MED GY IP 250 OP 250 PS 637: Performed by: HOSPITALIST

## 2021-01-22 PROCEDURE — 250N000013 HC RX MED GY IP 250 OP 250 PS 637: Performed by: PSYCHIATRY & NEUROLOGY

## 2021-01-22 PROCEDURE — 120N000001 HC R&B MED SURG/OB

## 2021-01-22 PROCEDURE — 99231 SBSQ HOSP IP/OBS SF/LOW 25: CPT | Performed by: STUDENT IN AN ORGANIZED HEALTH CARE EDUCATION/TRAINING PROGRAM

## 2021-01-22 RX ADMIN — MEMANTINE 5 MG: 5 TABLET ORAL at 09:39

## 2021-01-22 RX ADMIN — NICOTINE 1 PATCH: 7 PATCH, EXTENDED RELEASE TRANSDERMAL at 09:41

## 2021-01-22 RX ADMIN — HALOPERIDOL 10 MG: 5 TABLET ORAL at 09:39

## 2021-01-22 RX ADMIN — SALMETEROL XINAFOATE 1 PUFF: 50 POWDER, METERED ORAL; RESPIRATORY (INHALATION) at 21:34

## 2021-01-22 RX ADMIN — ASPIRIN 81 MG: 81 TABLET, COATED ORAL at 09:39

## 2021-01-22 RX ADMIN — ATORVASTATIN CALCIUM 80 MG: 40 TABLET, FILM COATED ORAL at 21:32

## 2021-01-22 RX ADMIN — HALOPERIDOL 10 MG: 5 TABLET ORAL at 15:40

## 2021-01-22 RX ADMIN — SALMETEROL XINAFOATE 1 PUFF: 50 POWDER, METERED ORAL; RESPIRATORY (INHALATION) at 09:41

## 2021-01-22 RX ADMIN — HALOPERIDOL 10 MG: 5 TABLET ORAL at 21:32

## 2021-01-22 RX ADMIN — VENLAFAXINE HYDROCHLORIDE 75 MG: 75 CAPSULE, EXTENDED RELEASE ORAL at 09:39

## 2021-01-22 RX ADMIN — DOCUSATE SODIUM 100 MG: 100 CAPSULE, LIQUID FILLED ORAL at 21:32

## 2021-01-22 RX ADMIN — DOCUSATE SODIUM 100 MG: 100 CAPSULE, LIQUID FILLED ORAL at 09:39

## 2021-01-22 RX ADMIN — Medication 0.25 MG: at 21:32

## 2021-01-22 RX ADMIN — LEVOTHYROXINE SODIUM 88 MCG: 88 TABLET ORAL at 09:40

## 2021-01-22 RX ADMIN — Medication 0.25 MG: at 09:40

## 2021-01-22 ASSESSMENT — ACTIVITIES OF DAILY LIVING (ADL)
ADLS_ACUITY_SCORE: 15

## 2021-01-22 NOTE — PLAN OF CARE
No changes, pt calm and cooperative. New scrubs changed. Denies pain. Mepilex on mid back, CDI. Nicotine patch L deltoid. Door alarm in place. Discharge pending placement, no new updates per  note 1/19.

## 2021-01-23 PROCEDURE — 250N000013 HC RX MED GY IP 250 OP 250 PS 637: Performed by: PSYCHIATRY & NEUROLOGY

## 2021-01-23 PROCEDURE — 250N000013 HC RX MED GY IP 250 OP 250 PS 637: Performed by: STUDENT IN AN ORGANIZED HEALTH CARE EDUCATION/TRAINING PROGRAM

## 2021-01-23 PROCEDURE — 250N000013 HC RX MED GY IP 250 OP 250 PS 637: Performed by: HOSPITALIST

## 2021-01-23 PROCEDURE — 250N000013 HC RX MED GY IP 250 OP 250 PS 637: Performed by: INTERNAL MEDICINE

## 2021-01-23 PROCEDURE — 99233 SBSQ HOSP IP/OBS HIGH 50: CPT | Performed by: STUDENT IN AN ORGANIZED HEALTH CARE EDUCATION/TRAINING PROGRAM

## 2021-01-23 PROCEDURE — 120N000001 HC R&B MED SURG/OB

## 2021-01-23 RX ADMIN — LEVOTHYROXINE SODIUM 88 MCG: 88 TABLET ORAL at 08:49

## 2021-01-23 RX ADMIN — HALOPERIDOL 10 MG: 5 TABLET ORAL at 21:19

## 2021-01-23 RX ADMIN — NICOTINE 1 PATCH: 7 PATCH, EXTENDED RELEASE TRANSDERMAL at 08:50

## 2021-01-23 RX ADMIN — CEPHALEXIN 250 MG: 250 CAPSULE ORAL at 21:19

## 2021-01-23 RX ADMIN — HALOPERIDOL 10 MG: 5 TABLET ORAL at 15:00

## 2021-01-23 RX ADMIN — SALMETEROL XINAFOATE 1 PUFF: 50 POWDER, METERED ORAL; RESPIRATORY (INHALATION) at 08:50

## 2021-01-23 RX ADMIN — VENLAFAXINE HYDROCHLORIDE 75 MG: 75 CAPSULE, EXTENDED RELEASE ORAL at 08:49

## 2021-01-23 RX ADMIN — DOCUSATE SODIUM 100 MG: 100 CAPSULE, LIQUID FILLED ORAL at 08:49

## 2021-01-23 RX ADMIN — SALMETEROL XINAFOATE 1 PUFF: 50 POWDER, METERED ORAL; RESPIRATORY (INHALATION) at 21:20

## 2021-01-23 RX ADMIN — ATORVASTATIN CALCIUM 80 MG: 40 TABLET, FILM COATED ORAL at 21:19

## 2021-01-23 RX ADMIN — Medication 0.25 MG: at 08:49

## 2021-01-23 RX ADMIN — CEPHALEXIN 250 MG: 250 CAPSULE ORAL at 18:28

## 2021-01-23 RX ADMIN — HALOPERIDOL 10 MG: 5 TABLET ORAL at 08:49

## 2021-01-23 RX ADMIN — DOCUSATE SODIUM 100 MG: 100 CAPSULE, LIQUID FILLED ORAL at 21:19

## 2021-01-23 RX ADMIN — CEPHALEXIN 250 MG: 250 CAPSULE ORAL at 14:59

## 2021-01-23 RX ADMIN — Medication 0.25 MG: at 21:19

## 2021-01-23 RX ADMIN — ASPIRIN 81 MG: 81 TABLET, COATED ORAL at 08:49

## 2021-01-23 RX ADMIN — MEMANTINE 5 MG: 5 TABLET ORAL at 08:49

## 2021-01-23 ASSESSMENT — ACTIVITIES OF DAILY LIVING (ADL)
ADLS_ACUITY_SCORE: 15

## 2021-01-23 NOTE — PLAN OF CARE
Pt calm and cooperative. Denied pain. Nicotine patch in place to R arm. Mepilex to back. No PIV. Regular diet. Continent. Up independently in room. Discharge pending placement.

## 2021-01-23 NOTE — PROGRESS NOTES
[facility]    Medicine Progress Note - Hospitalist Service       Date of Admission:  9/9/2020  Date of Service: 01/23/2021    Assessment & Plan         John Juárez is a 55 year old male with PMHx of schizophrenia, hx of TBI, alcohol use disorder, COPD, hyperlipidemia and hypothyroidism who was admitted on 9/9/2020 for evaluation of aggressive behavior at his group home.  He has been evaluated by Psychiatry and his medical condition remains stable, though his group home is now unwilling to take him back and thus hospitalization has been prolonged awaiting new placement.      Neurocognitive disorder dt hx of TBI and alcohol use disorder  Schizophrenia  Under commitment  Had been residing in group home prior to admission.  Brought to hospital for evaluation of aggressive behaviors. Group home now unwilling to take him back. Has had numerous CODE 21s called this stay. Seen by psych, meds adjusted. Is currently under civil commitment and court hold through [facility] until 7/31/21. Earlier on in stay, psych had recommended geripsych placement but per evaluation on 12/18, no longer felt this was needed and recommended to continue current meds. QTc 428 on EKG on 12/18. Seen again by psych on 1/5/21 and again not felt to need inpatient psych stay.   - On Haldol 10mg TID, memantine 5 mg daily, venlafaxine 75 mg daily, benztropine 0.25 mg BID  - Haloperidol, olanzapine, and lorazepam PRN for acute agitation  - Door alarm in place and necessary as patient has attempted elopement as recent as 1/16  - SW following for placement     Cellulitis - NEW  Assessment: midline back shows wound with mild drainage and surrounding erythema  Plan:  - Keflex QID started  - WOC    Baseline Hypotension  Systolic BP generally around 90s-100s . Asymptomatic. No concerns.     Dyslipidemia  LDL 43, HDL 32 Jan 2020.   - continue atorvastatin     COPD  Chronic and stable on RA.   - continue salmeterol, albuterol  prn     Hypothyroidism  TSH 3.18 Sept 2020.   - continue levothyroxine     Tobacco use disorder  Nicotine patch and gum PRN.     Resting tremor of upper extremities  No acute issues      Asymptomatic COVID19 screening   - for discharge planning: negative on 12/6/2020      Diet: Room Service  Diet  Combination Diet Regular Diet Adult; Safe Tray - with utensils    DVT Prophylaxis: Pneumatic Compression Devices  Valentin Catheter: not present  Code Status: Full Code           Disposition Plan   Expected discharge: Pending placement  Entered: Jose Walker MD 01/23/2021, 3:08 PM       The patient's care was discussed with the Bedside Nurse and Patient.    Jose Walker MD  Hospitalist Service  [facility]  Contact information available via [facility] Paging/Directory      Patient, family, interdisciplinary team involved in care and agrees with plan.  Total time - Greater than 35 min. More than 50% of time spent in direct patient care, care coordination, patient/caregiver counseling, and formalizing plan of care.     ______________________________________________________________________    Interval History      Patient seen and examined.  No new complaints or issues      Data reviewed today: I reviewed all medications, new labs and imaging results over the last 24 hours. I personally reviewed no images or EKG's today.    Physical Exam   Vital Signs: Temp: 96.2  F (35.7  C) Temp src: Oral BP: 94/69 Pulse: 77   Resp: 16 SpO2: 98 % O2 Device: None (Room air)    Weight: 182 lbs 0 oz    Gen: NAD, calm  HEENT: EOMI, MMM  Resp: no crackles,  no wheezes, no increased work of resp  CV: S1S2 heard, reg rhythm, reg rate, no pitting pedal edema  Abdo: soft, nontender, nondistended, bowel sounds present  Ext: calves nontender, well perfused  Neuro: awake and alert, moving all extremities  Calm, flat affect    Data   No new labs

## 2021-01-23 NOTE — PROGRESS NOTES
[facility]    Medicine Progress Note - Hospitalist Service       Date of Admission:  9/9/2020  Date of Service: 01/22/2021    Assessment & Plan       John Juárez is a 55 year old male with PMHx of schizophrenia, hx of TBI, alcohol use disorder, COPD, hyperlipidemia and hypothyroidism who was admitted on 9/9/2020 for evaluation of aggressive behavior at his group home.  He has been evaluated by Psychiatry and his medical condition remains stable, though his group home is now unwilling to take him back and thus hospitalization has been prolonged awaiting new placement.      Neurocognitive disorder dt hx of TBI and alcohol use disorder  Schizophrenia  Under commitment  Had been residing in group home prior to admission.  Brought to hospital for evaluation of aggressive behaviors. Group home now unwilling to take him back. Has had numerous CODE 21s called this stay. Seen by psych, meds adjusted. Is currently under civil commitment and court hold through [facility] until 7/31/21. Earlier on in stay, psych had recommended geripsych placement but per evaluation on 12/18, no longer felt this was needed and recommended to continue current meds. QTc 428 on EKG on 12/18. Seen again by psych on 1/5/21 and again not felt to need inpatient psych stay.   - On Haldol 10mg TID, memantine 5 mg daily, venlafaxine 75 mg daily, benztropine 0.25 mg BID  - Haloperidol, olanzapine, and lorazepam PRN for acute agitation  - Door alarm in place and necessary as patient has attempted elopement as recent as 1/16  - SW following for placement     Baseline Hypotension  Systolic BP generally around 90s-100s . Asymptomatic. No concerns.     Dyslipidemia  LDL 43, HDL 32 Jan 2020.   - continue atorvastatin     COPD  Chronic and stable on RA.   - continue salmeterol, albuterol prn     Hypothyroidism  TSH 3.18 Sept 2020.   - continue levothyroxine     Tobacco use disorder  Nicotine patch and gum PRN.     Resting  tremor of upper extremities  No acute issues      Asymptomatic COVID19 screening   - for discharge planning: negative on 12/6/2020      Diet: Room Service  Diet  Combination Diet Regular Diet Adult; Safe Tray - with utensils    DVT Prophylaxis: Pneumatic Compression Devices  Valentin Catheter: not present  Code Status: Full Code           Disposition Plan   Expected discharge: Pending placement  Entered: Jose Walker MD 01/22/2021, 6:56 PM       The patient's care was discussed with the Bedside Nurse and Patient.    Jose Walker MD  Hospitalist Service  [facility]  Contact information available via [facility] Paging/Directory    ______________________________________________________________________    Interval History      Patient seen and examined.  No new complaints or issues      Data reviewed today: I reviewed all medications, new labs and imaging results over the last 24 hours. I personally reviewed no images or EKG's today.    Physical Exam   Vital Signs: Temp: 95.1  F (35.1  C) Temp src: Oral BP: 109/74 Pulse: 73   Resp: 16 SpO2: 98 % O2 Device: None (Room air)    Weight: 182 lbs 0 oz    Gen: NAD, calm  HEENT: EOMI, MMM  Resp: no crackles,  no wheezes, no increased work of resp  CV: S1S2 heard, reg rhythm, reg rate, no pitting pedal edema  Abdo: soft, nontender, nondistended, bowel sounds present  Ext: calves nontender, well perfused  Neuro: awake and alert, moving all extremities  Calm, flat affect    Data   No new labs

## 2021-01-23 NOTE — PLAN OF CARE
[id number]: Uneventful day. Minimal insight. VSS. Denied pain. Cooperative. Mepilex on back and nicotine patch on R arm. Good appetite. Discharge pending placement.

## 2021-01-24 LAB
ERYTHROCYTE [DISTWIDTH] IN BLOOD BY AUTOMATED COUNT: 14.2 % (ref 10–15)
HCT VFR BLD AUTO: 41 % (ref 40–53)
HGB BLD-MCNC: 14.3 G/DL (ref 13.3–17.7)
MCH RBC QN AUTO: 32.9 PG (ref 26.5–33)
MCHC RBC AUTO-ENTMCNC: 34.9 G/DL (ref 31.5–36.5)
MCV RBC AUTO: 95 FL (ref 78–100)
PLATELET # BLD AUTO: 213 10E9/L (ref 150–450)
RBC # BLD AUTO: 4.34 10E12/L (ref 4.4–5.9)
WBC # BLD AUTO: 6.2 10E9/L (ref 4–11)

## 2021-01-24 PROCEDURE — 85027 COMPLETE CBC AUTOMATED: CPT | Performed by: STUDENT IN AN ORGANIZED HEALTH CARE EDUCATION/TRAINING PROGRAM

## 2021-01-24 PROCEDURE — 250N000013 HC RX MED GY IP 250 OP 250 PS 637: Performed by: PSYCHIATRY & NEUROLOGY

## 2021-01-24 PROCEDURE — 36415 COLL VENOUS BLD VENIPUNCTURE: CPT | Performed by: STUDENT IN AN ORGANIZED HEALTH CARE EDUCATION/TRAINING PROGRAM

## 2021-01-24 PROCEDURE — 250N000013 HC RX MED GY IP 250 OP 250 PS 637: Performed by: INTERNAL MEDICINE

## 2021-01-24 PROCEDURE — 250N000013 HC RX MED GY IP 250 OP 250 PS 637: Performed by: STUDENT IN AN ORGANIZED HEALTH CARE EDUCATION/TRAINING PROGRAM

## 2021-01-24 PROCEDURE — 120N000001 HC R&B MED SURG/OB

## 2021-01-24 PROCEDURE — 250N000013 HC RX MED GY IP 250 OP 250 PS 637: Performed by: HOSPITALIST

## 2021-01-24 PROCEDURE — 99232 SBSQ HOSP IP/OBS MODERATE 35: CPT | Performed by: STUDENT IN AN ORGANIZED HEALTH CARE EDUCATION/TRAINING PROGRAM

## 2021-01-24 RX ADMIN — LEVOTHYROXINE SODIUM 88 MCG: 88 TABLET ORAL at 09:09

## 2021-01-24 RX ADMIN — OLANZAPINE 5 MG: 5 TABLET, ORALLY DISINTEGRATING ORAL at 00:07

## 2021-01-24 RX ADMIN — ASPIRIN 81 MG: 81 TABLET, COATED ORAL at 09:08

## 2021-01-24 RX ADMIN — CEPHALEXIN 250 MG: 250 CAPSULE ORAL at 09:08

## 2021-01-24 RX ADMIN — Medication 0.25 MG: at 21:00

## 2021-01-24 RX ADMIN — HALOPERIDOL 10 MG: 5 TABLET ORAL at 15:57

## 2021-01-24 RX ADMIN — CEPHALEXIN 250 MG: 250 CAPSULE ORAL at 21:00

## 2021-01-24 RX ADMIN — ATORVASTATIN CALCIUM 80 MG: 40 TABLET, FILM COATED ORAL at 21:00

## 2021-01-24 RX ADMIN — Medication 0.25 MG: at 09:08

## 2021-01-24 RX ADMIN — DOCUSATE SODIUM 100 MG: 100 CAPSULE, LIQUID FILLED ORAL at 21:00

## 2021-01-24 RX ADMIN — SALMETEROL XINAFOATE 1 PUFF: 50 POWDER, METERED ORAL; RESPIRATORY (INHALATION) at 09:09

## 2021-01-24 RX ADMIN — CEPHALEXIN 250 MG: 250 CAPSULE ORAL at 17:40

## 2021-01-24 RX ADMIN — HALOPERIDOL 10 MG: 5 TABLET ORAL at 09:08

## 2021-01-24 RX ADMIN — NICOTINE 1 PATCH: 7 PATCH, EXTENDED RELEASE TRANSDERMAL at 09:10

## 2021-01-24 RX ADMIN — HALOPERIDOL 10 MG: 5 TABLET ORAL at 21:00

## 2021-01-24 RX ADMIN — SALMETEROL XINAFOATE 1 PUFF: 50 POWDER, METERED ORAL; RESPIRATORY (INHALATION) at 21:00

## 2021-01-24 RX ADMIN — VENLAFAXINE HYDROCHLORIDE 75 MG: 75 CAPSULE, EXTENDED RELEASE ORAL at 09:09

## 2021-01-24 RX ADMIN — CEPHALEXIN 250 MG: 250 CAPSULE ORAL at 13:18

## 2021-01-24 RX ADMIN — MEMANTINE 5 MG: 5 TABLET ORAL at 09:08

## 2021-01-24 RX ADMIN — DOCUSATE SODIUM 100 MG: 100 CAPSULE, LIQUID FILLED ORAL at 09:09

## 2021-01-24 ASSESSMENT — ACTIVITIES OF DAILY LIVING (ADL)
ADLS_ACUITY_SCORE: 17
ADLS_ACUITY_SCORE: 15
ADLS_ACUITY_SCORE: 15
ADLS_ACUITY_SCORE: 17
ADLS_ACUITY_SCORE: 15
ADLS_ACUITY_SCORE: 15

## 2021-01-24 NOTE — PLAN OF CARE
Uneventful shift. VSS. Calm, cooperative, and redirectable. Denied pain. Showered today. Good appetite. Pustule on back oozing blood/hardened pus. Cleansed with normal saline and gauze/mepilex placed. MD notified, keflex started and WOC to see. Up independently in room. Discharge pending placement.

## 2021-01-24 NOTE — PROGRESS NOTES
[facility]    Medicine Progress Note - Hospitalist Service       Date of Admission:  9/9/2020  Date of Service: 01/24/2021    Assessment & Plan         John Juárez is a 55 year old male with PMHx of schizophrenia, hx of TBI, alcohol use disorder, COPD, hyperlipidemia and hypothyroidism who was admitted on 9/9/2020 for evaluation of aggressive behavior at his group home.  He has been evaluated by Psychiatry and his medical condition remains stable, though his group home is now unwilling to take him back and thus hospitalization has been prolonged awaiting new placement.      Neurocognitive disorder dt hx of TBI and alcohol use disorder  Schizophrenia  Under commitment  Had been residing in group home prior to admission.  Brought to hospital for evaluation of aggressive behaviors. Group home now unwilling to take him back. Has had numerous CODE 21s called this stay. Seen by psych, meds adjusted. Is currently under civil commitment and court hold through [facility] until 7/31/21. Earlier on in stay, psych had recommended geripsych placement but per evaluation on 12/18, no longer felt this was needed and recommended to continue current meds. QTc 428 on EKG on 12/18. Seen again by psych on 1/5/21 and again not felt to need inpatient psych stay.   - On Haldol 10mg TID, memantine 5 mg daily, venlafaxine 75 mg daily, benztropine 0.25 mg BID  - Haloperidol, olanzapine, and lorazepam PRN for acute agitation  - Door alarm in place and necessary as patient has attempted elopement as recent as 1/16  - SW following for placement     Cellulitis - NEW  Assessment: midline back shows wound with mild drainage and surrounding erythema  Plan:  - Keflex QID started  - WOC    Baseline Hypotension  Systolic BP generally around 90s-100s . Asymptomatic. No concerns.     Dyslipidemia  LDL 43, HDL 32 Jan 2020.   - continue atorvastatin     COPD  Chronic and stable on RA.   - continue salmeterol, albuterol  prn     Hypothyroidism  TSH 3.18 Sept 2020.   - continue levothyroxine     Tobacco use disorder  Nicotine patch and gum PRN.     Resting tremor of upper extremities  No acute issues      Asymptomatic COVID19 screening   - for discharge planning: negative on 12/6/2020      Diet: Room Service  Diet  Combination Diet Regular Diet Adult; Safe Tray - with utensils    DVT Prophylaxis: Pneumatic Compression Devices  Valentin Catheter: not present  Code Status: Full Code           Disposition Plan   Expected discharge: Pending placement  Entered: Jose Walker MD 01/24/2021, 3:54 PM       The patient's care was discussed with the Bedside Nurse and Patient.    Jose Walker MD  Hospitalist Service  [facility]  Contact information available via [facility] Paging/Directory  ______________________________________________________________________    Interval History      No acute events overnight  Patient seen and examined.  No new complaints or issues  Back pain better.    Data reviewed today: I reviewed all medications, new labs and imaging results over the last 24 hours. I personally reviewed no images or EKG's today.    Physical Exam   Vital Signs: Temp: 97.2  F (36.2  C) Temp src: Oral BP: (!) 83/55 Pulse: 78   Resp: 16 SpO2: 97 % O2 Device: None (Room air)    Weight: 182 lbs 0 oz    Gen: NAD, calm  HEENT: EOMI, MMM  Resp: no crackles,  no wheezes, no increased work of resp  CV: S1S2 heard, reg rhythm, reg rate, no pitting pedal edema  Abdo: soft, nontender, nondistended, bowel sounds present  Ext: calves nontender, well perfused  Neuro: awake and alert, moving all extremities  Calm, flat affect    Data   No new labs

## 2021-01-24 NOTE — PLAN OF CARE
Pt disoriented to situation and place. PRN Zyprexa x1 for increased agitation overnight. Denied pain. Pustule on back w/ small amount of drainage, gauze/mepilex in place. WOC to see. Up independently in room. Door alarm active. Discharge pending placement.

## 2021-01-25 PROCEDURE — 250N000013 HC RX MED GY IP 250 OP 250 PS 637: Performed by: PSYCHIATRY & NEUROLOGY

## 2021-01-25 PROCEDURE — 250N000013 HC RX MED GY IP 250 OP 250 PS 637: Performed by: INTERNAL MEDICINE

## 2021-01-25 PROCEDURE — 120N000001 HC R&B MED SURG/OB

## 2021-01-25 PROCEDURE — G0463 HOSPITAL OUTPT CLINIC VISIT: HCPCS

## 2021-01-25 PROCEDURE — 250N000013 HC RX MED GY IP 250 OP 250 PS 637: Performed by: HOSPITALIST

## 2021-01-25 PROCEDURE — 99232 SBSQ HOSP IP/OBS MODERATE 35: CPT | Performed by: STUDENT IN AN ORGANIZED HEALTH CARE EDUCATION/TRAINING PROGRAM

## 2021-01-25 PROCEDURE — 250N000013 HC RX MED GY IP 250 OP 250 PS 637: Performed by: STUDENT IN AN ORGANIZED HEALTH CARE EDUCATION/TRAINING PROGRAM

## 2021-01-25 RX ADMIN — MEMANTINE 5 MG: 5 TABLET ORAL at 08:44

## 2021-01-25 RX ADMIN — CEPHALEXIN 250 MG: 250 CAPSULE ORAL at 08:44

## 2021-01-25 RX ADMIN — SALMETEROL XINAFOATE 1 PUFF: 50 POWDER, METERED ORAL; RESPIRATORY (INHALATION) at 10:27

## 2021-01-25 RX ADMIN — DOCUSATE SODIUM 100 MG: 100 CAPSULE, LIQUID FILLED ORAL at 08:44

## 2021-01-25 RX ADMIN — HALOPERIDOL 10 MG: 5 TABLET ORAL at 21:40

## 2021-01-25 RX ADMIN — VENLAFAXINE HYDROCHLORIDE 75 MG: 75 CAPSULE, EXTENDED RELEASE ORAL at 08:44

## 2021-01-25 RX ADMIN — NICOTINE 1 PATCH: 7 PATCH, EXTENDED RELEASE TRANSDERMAL at 08:49

## 2021-01-25 RX ADMIN — LORAZEPAM 1 MG: 1 TABLET ORAL at 17:51

## 2021-01-25 RX ADMIN — Medication 0.25 MG: at 08:44

## 2021-01-25 RX ADMIN — HALOPERIDOL 10 MG: 5 TABLET ORAL at 15:26

## 2021-01-25 RX ADMIN — CEPHALEXIN 250 MG: 250 CAPSULE ORAL at 21:39

## 2021-01-25 RX ADMIN — LEVOTHYROXINE SODIUM 88 MCG: 88 TABLET ORAL at 08:44

## 2021-01-25 RX ADMIN — Medication 0.25 MG: at 21:39

## 2021-01-25 RX ADMIN — CEPHALEXIN 250 MG: 250 CAPSULE ORAL at 17:23

## 2021-01-25 RX ADMIN — ATORVASTATIN CALCIUM 80 MG: 40 TABLET, FILM COATED ORAL at 21:39

## 2021-01-25 RX ADMIN — CEPHALEXIN 250 MG: 250 CAPSULE ORAL at 13:08

## 2021-01-25 RX ADMIN — DOCUSATE SODIUM 100 MG: 100 CAPSULE, LIQUID FILLED ORAL at 21:39

## 2021-01-25 RX ADMIN — HALOPERIDOL 10 MG: 5 TABLET ORAL at 08:44

## 2021-01-25 RX ADMIN — SALMETEROL XINAFOATE 1 PUFF: 50 POWDER, METERED ORAL; RESPIRATORY (INHALATION) at 21:43

## 2021-01-25 RX ADMIN — ASPIRIN 81 MG: 81 TABLET, COATED ORAL at 08:45

## 2021-01-25 ASSESSMENT — ACTIVITIES OF DAILY LIVING (ADL)
ADLS_ACUITY_SCORE: 15
ADLS_ACUITY_SCORE: 17
ADLS_ACUITY_SCORE: 15
ADLS_ACUITY_SCORE: 17
ADLS_ACUITY_SCORE: 15
ADLS_ACUITY_SCORE: 17

## 2021-01-25 NOTE — PROGRESS NOTES
A&O to self and time. Up IND. Calm this shift. Regular diet. Pustule on back, c/d/I. Continent. No IV access. Discharge pending placement.

## 2021-01-25 NOTE — PLAN OF CARE
A/Ox2. Disoriented to situation and place. Calm this shift. VSS on RA. Up ind in room. Door alarm. Regular diet. Nicotine patch on left arm. Pustule on back covered with mepilex, CDI. Continent. No IV access. Discharge pending placement.

## 2021-01-25 NOTE — PROGRESS NOTES
Uneventful shift. VSS. Denied pain. Showered today. Pustule on back improved. Up indep, door alarm on. Plan pending placement at [facility].

## 2021-01-25 NOTE — PROGRESS NOTES
"  [facility] WOC Nurse Inpatient Wound Assessment    Assessment of wound(s) on pt's:  Back wound     WOC NURSE ASSESSMENT     Mid-back wound: appears to be a furuncle with now bloody base, .5cm depth with 1.0 undermining.     No longer purulent drainage,  Appears to be resolving erythema     WOC NURSE TREATMENT PLAN    Wound care back: Dressing change daily  1. Clean wound with MicroKlenz  2. Pack with 1.5\" strip of 1/2\" Iodoform Nu-Gauze packing strip  3. Cover with Mepilex border dressing   4. Pt may shower and then complete wound care  Nursing to notify Provider(s) and re-consult the WO Nurse if wound(s) deteriorate or new skin concerns    WOC Nurse follow-up plan: weekly and prn   OBJECTIVE DATA    Patient history according to provider notes:  John Juárez is a 55 year old male with PMHx of schizophrenia, hx of TBI, alcohol use disorder, COPD, hyperlipidemia and hypothyroidism who was admitted on 9/9/2020 for evaluation of aggressive behavior at his group home.  He has been evaluated by Psychiatry and his medical condition remains stable, though his group home is now unwilling to take him back and thus hospitalization has been prolonged awaiting new placement.      Neurocognitive disorder dt hx of TBI and alcohol use disorder  Schizophrenia  Under commitment  Had been residing in group home prior to admission.  Brought to hospital for evaluation of aggressive behaviors. Group home now unwilling to take him back. Has had numerous CODE 21s called this stay. Seen by psych, meds adjusted. Is currently under civil commitment and court hold through [facility] until 7/31/21. Earlier on in stay, psych had recommended geripsych placement but per evaluation on 12/18, no longer felt this was needed and recommended to continue current meds. QTc 428 on EKG on 12/18. Seen again by psych on 1/5/21 and again not felt to need inpatient psych stay.   - On Haldol 10mg TID, memantine 5 mg daily, venlafaxine 75 mg daily, " benztropine 0.25 mg BID  - Haloperidol, olanzapine, and lorazepam PRN for acute agitation  - Door alarm in place and necessary as patient has attempted elopement as recent as   - SW following for placement     Cellulitis - NEW  Assessment: midline back shows wound with mild drainage and surrounding erythema  Plan:  - Keflex QID started  - WOC     Baseline Hypotension  Systolic BP generally around 90s-100s . Asymptomatic. No concerns.     Dyslipidemia  LDL 43, HDL .   - continue atorvastatin     COPD  Chronic and stable on RA.   - continue salmeterol, albuterol prn  Rinku Risk Assessment  Sensory Perception: 4-->no impairment    Moisture: 4-->rarely moist   Activity: 3-->walks occasionally     Mobility: 4-->no limitation   Nutrition: 3-->adequate   Friction and Shear: 3-->no apparent problem      Positioning: Rt/Lt positioning only    Mattress:  Atmos air    Current diet  Orders Placed This Encounter      Diet      Combination Diet Regular Diet Adult; Safe Tray - with utensils      Moisture Management:  BRP     Labs:   Recent Labs   Lab Test 21  0834 20  0833 20  0833 14   ALBUMIN  --   --  3.7   < > 4.0   HGB 14.3   < > 16.4   < > 14.2   INR  --   --   --   --  0.99   WBC 6.2   < > 5.4   < > 6.5    < > = values in this interval not displayed.         WOC NURSE PHYSICAL EXAM  Wound History:   First noted on 21 by Nursing staff  Date of last  WOC photo: Photo did not upload  Size:  .5cm x .5cm x .5cm with 100% bloody base  Underminin.0cm distally   Odor: none  Hilary-wound skin:  Dull erythema  Pain:  Denies    WOC NURSE INTERVENTIONS    Assessed   Wound Care:  Packed today with small amt gauze moistened with MicorKlenz  Supplies: In floor supply room       Paola Fernandez RN WOCN

## 2021-01-26 PROCEDURE — 250N000013 HC RX MED GY IP 250 OP 250 PS 637: Performed by: INTERNAL MEDICINE

## 2021-01-26 PROCEDURE — 250N000013 HC RX MED GY IP 250 OP 250 PS 637: Performed by: PSYCHIATRY & NEUROLOGY

## 2021-01-26 PROCEDURE — 99231 SBSQ HOSP IP/OBS SF/LOW 25: CPT | Performed by: INTERNAL MEDICINE

## 2021-01-26 PROCEDURE — 250N000013 HC RX MED GY IP 250 OP 250 PS 637: Performed by: STUDENT IN AN ORGANIZED HEALTH CARE EDUCATION/TRAINING PROGRAM

## 2021-01-26 PROCEDURE — 250N000013 HC RX MED GY IP 250 OP 250 PS 637: Performed by: HOSPITALIST

## 2021-01-26 PROCEDURE — 120N000001 HC R&B MED SURG/OB

## 2021-01-26 RX ADMIN — OLANZAPINE 5 MG: 5 TABLET, ORALLY DISINTEGRATING ORAL at 13:50

## 2021-01-26 RX ADMIN — HALOPERIDOL 10 MG: 5 TABLET ORAL at 15:51

## 2021-01-26 RX ADMIN — ATORVASTATIN CALCIUM 80 MG: 40 TABLET, FILM COATED ORAL at 21:54

## 2021-01-26 RX ADMIN — Medication 0.25 MG: at 08:34

## 2021-01-26 RX ADMIN — DOCUSATE SODIUM 100 MG: 100 CAPSULE, LIQUID FILLED ORAL at 08:34

## 2021-01-26 RX ADMIN — CEPHALEXIN 250 MG: 250 CAPSULE ORAL at 12:20

## 2021-01-26 RX ADMIN — CEPHALEXIN 250 MG: 250 CAPSULE ORAL at 21:54

## 2021-01-26 RX ADMIN — OLANZAPINE 5 MG: 5 TABLET, ORALLY DISINTEGRATING ORAL at 12:20

## 2021-01-26 RX ADMIN — HALOPERIDOL 10 MG: 5 TABLET ORAL at 21:54

## 2021-01-26 RX ADMIN — CEPHALEXIN 250 MG: 250 CAPSULE ORAL at 18:04

## 2021-01-26 RX ADMIN — SALMETEROL XINAFOATE 1 PUFF: 50 POWDER, METERED ORAL; RESPIRATORY (INHALATION) at 10:46

## 2021-01-26 RX ADMIN — CEPHALEXIN 250 MG: 250 CAPSULE ORAL at 08:34

## 2021-01-26 RX ADMIN — VENLAFAXINE HYDROCHLORIDE 75 MG: 75 CAPSULE, EXTENDED RELEASE ORAL at 08:35

## 2021-01-26 RX ADMIN — MEMANTINE 5 MG: 5 TABLET ORAL at 08:34

## 2021-01-26 RX ADMIN — NICOTINE 1 PATCH: 7 PATCH, EXTENDED RELEASE TRANSDERMAL at 08:39

## 2021-01-26 RX ADMIN — Medication 0.25 MG: at 21:54

## 2021-01-26 RX ADMIN — HALOPERIDOL 10 MG: 5 TABLET ORAL at 08:34

## 2021-01-26 RX ADMIN — SALMETEROL XINAFOATE 1 PUFF: 50 POWDER, METERED ORAL; RESPIRATORY (INHALATION) at 21:57

## 2021-01-26 RX ADMIN — ASPIRIN 81 MG: 81 TABLET, COATED ORAL at 08:34

## 2021-01-26 RX ADMIN — LEVOTHYROXINE SODIUM 88 MCG: 88 TABLET ORAL at 08:34

## 2021-01-26 RX ADMIN — DOCUSATE SODIUM 100 MG: 100 CAPSULE, LIQUID FILLED ORAL at 21:55

## 2021-01-26 ASSESSMENT — ACTIVITIES OF DAILY LIVING (ADL)
ADLS_ACUITY_SCORE: 15

## 2021-01-26 NOTE — PROGRESS NOTES
[facility]    Medicine Progress Note - Hospitalist Service       Date of Admission:  9/9/2020  Date of Service: 01/25/2021    Assessment & Plan         John Juárez is a 55 year old male with PMHx of schizophrenia, hx of TBI, alcohol use disorder, COPD, hyperlipidemia and hypothyroidism who was admitted on 9/9/2020 for evaluation of aggressive behavior at his group home.  He has been evaluated by Psychiatry and his medical condition remains stable, though his group home is now unwilling to take him back and thus hospitalization has been prolonged awaiting new placement.      Neurocognitive disorder dt hx of TBI and alcohol use disorder  Schizophrenia  Under commitment  Had been residing in group home prior to admission.  Brought to hospital for evaluation of aggressive behaviors. Group home now unwilling to take him back. Has had numerous CODE 21s called this stay. Seen by psych, meds adjusted. Is currently under civil commitment and court hold through [facility] until 7/31/21. Earlier on in stay, psych had recommended geripsych placement but per evaluation on 12/18, no longer felt this was needed and recommended to continue current meds. QTc 428 on EKG on 12/18. Seen again by psych on 1/5/21 and again not felt to need inpatient psych stay.   - On Haldol 10mg TID, memantine 5 mg daily, venlafaxine 75 mg daily, benztropine 0.25 mg BID  - Haloperidol, olanzapine, and lorazepam PRN for acute agitation  - Door alarm in place and necessary as patient has attempted elopement as recent as 1/16  - SW following for placement     Cellulitis   Assessment: midline back shows wound with mild drainage and surrounding erythema, improving.   Plan:  - Keflex QID started 01/24  - WOC    Baseline Hypotension  Systolic BP generally around 90s-100s . Asymptomatic. No concerns.     Dyslipidemia  LDL 43, HDL 32 Jan 2020.   - continue atorvastatin     COPD  Chronic and stable on RA.   - continue  salmeterol, albuterol prn     Hypothyroidism  TSH 3.18 Sept 2020.   - continue levothyroxine     Tobacco use disorder  Nicotine patch and gum PRN.     Resting tremor of upper extremities  No acute issues      Asymptomatic COVID19 screening   - for discharge planning: negative on 12/6/2020      Diet: Room Service  Diet  Combination Diet Regular Diet Adult; Safe Tray - with utensils    DVT Prophylaxis: Pneumatic Compression Devices  Valentin Catheter: not present  Code Status: Full Code           Disposition Plan   Expected discharge: Pending placement  Entered: Jose Walker MD 01/25/2021, 7:08 PM       The patient's care was discussed with the Bedside Nurse and Patient.    Jose Walker MD  Hospitalist Service  [facility]  Contact information available via [facility] Paging/Directory  ______________________________________________________________________    Interval History      No acute events overnight  Patient seen and examined.  No new complaints or issues    Data reviewed today: I reviewed all medications, new labs and imaging results over the last 24 hours. I personally reviewed no images or EKG's today.    Physical Exam   Vital Signs: Temp: 96.4  F (35.8  C) Temp src: Oral BP: 93/55 Pulse: 70   Resp: 18 SpO2: 96 % O2 Device: None (Room air)    Weight: 182 lbs 0 oz    Gen: NAD, calm  HEENT: EOMI, MMM  Resp: no crackles,  no wheezes, no increased work of resp  CV: S1S2 heard, reg rhythm, reg rate, no pitting pedal edema  Abdo: soft, nontender, nondistended, bowel sounds present  Ext: calves nontender, well perfused  Neuro: awake and alert, moving all extremities  Calm, flat affect    Data   No new labs
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
## 2021-01-26 NOTE — PLAN OF CARE
A/Ox1, disoriented to place, time and situation. VSS on RA. Denies pain. Zyprexa 10mg given for agitation, effective. Regular diet, assist with ordering. Dressing on back, CDI. Nicotine patch on R arm. No IV access. Up independently in room, door alarm on. Discharge pending placement.

## 2021-01-26 NOTE — PLAN OF CARE
Alert to self only this evening. Sched Haldol and PRN Ativan x1 for agitation. Up ind in room. Door alarm. Regular diet, needs assistance ordering. Nicotine patch on left arm. Pustule on back covered with mepilex, CDI, WOC orders. Continent. No IV access. Discharge pending placement.

## 2021-01-26 NOTE — PLAN OF CARE
A&O to self only most of the time, labile in mood. VS deferred, ordered once daily. Denies pain. Back dressing CDI. No IV access. Up independently in room, door alarm on.

## 2021-01-27 PROCEDURE — 250N000013 HC RX MED GY IP 250 OP 250 PS 637: Performed by: INTERNAL MEDICINE

## 2021-01-27 PROCEDURE — 250N000013 HC RX MED GY IP 250 OP 250 PS 637: Performed by: STUDENT IN AN ORGANIZED HEALTH CARE EDUCATION/TRAINING PROGRAM

## 2021-01-27 PROCEDURE — 250N000013 HC RX MED GY IP 250 OP 250 PS 637: Performed by: PSYCHIATRY & NEUROLOGY

## 2021-01-27 PROCEDURE — 250N000013 HC RX MED GY IP 250 OP 250 PS 637: Performed by: HOSPITALIST

## 2021-01-27 PROCEDURE — 99231 SBSQ HOSP IP/OBS SF/LOW 25: CPT | Performed by: INTERNAL MEDICINE

## 2021-01-27 PROCEDURE — 120N000001 HC R&B MED SURG/OB

## 2021-01-27 RX ADMIN — VENLAFAXINE HYDROCHLORIDE 75 MG: 75 CAPSULE, EXTENDED RELEASE ORAL at 08:33

## 2021-01-27 RX ADMIN — DOCUSATE SODIUM 100 MG: 100 CAPSULE, LIQUID FILLED ORAL at 21:10

## 2021-01-27 RX ADMIN — MEMANTINE 5 MG: 5 TABLET ORAL at 08:33

## 2021-01-27 RX ADMIN — LORAZEPAM 1 MG: 1 TABLET ORAL at 05:24

## 2021-01-27 RX ADMIN — ATORVASTATIN CALCIUM 80 MG: 40 TABLET, FILM COATED ORAL at 21:10

## 2021-01-27 RX ADMIN — HALOPERIDOL 10 MG: 5 TABLET ORAL at 08:33

## 2021-01-27 RX ADMIN — LEVOTHYROXINE SODIUM 88 MCG: 88 TABLET ORAL at 08:33

## 2021-01-27 RX ADMIN — CEPHALEXIN 250 MG: 250 CAPSULE ORAL at 13:14

## 2021-01-27 RX ADMIN — SALMETEROL XINAFOATE 1 PUFF: 50 POWDER, METERED ORAL; RESPIRATORY (INHALATION) at 10:15

## 2021-01-27 RX ADMIN — Medication 0.25 MG: at 08:33

## 2021-01-27 RX ADMIN — CEPHALEXIN 250 MG: 250 CAPSULE ORAL at 21:10

## 2021-01-27 RX ADMIN — CEPHALEXIN 250 MG: 250 CAPSULE ORAL at 17:46

## 2021-01-27 RX ADMIN — CEPHALEXIN 250 MG: 250 CAPSULE ORAL at 08:33

## 2021-01-27 RX ADMIN — HALOPERIDOL 10 MG: 5 TABLET ORAL at 15:54

## 2021-01-27 RX ADMIN — DOCUSATE SODIUM 100 MG: 100 CAPSULE, LIQUID FILLED ORAL at 08:33

## 2021-01-27 RX ADMIN — ASPIRIN 81 MG: 81 TABLET, COATED ORAL at 08:33

## 2021-01-27 RX ADMIN — HALOPERIDOL 10 MG: 5 TABLET ORAL at 21:10

## 2021-01-27 RX ADMIN — Medication 0.25 MG: at 21:10

## 2021-01-27 RX ADMIN — NICOTINE 1 PATCH: 7 PATCH, EXTENDED RELEASE TRANSDERMAL at 10:17

## 2021-01-27 RX ADMIN — SALMETEROL XINAFOATE 1 PUFF: 50 POWDER, METERED ORAL; RESPIRATORY (INHALATION) at 21:10

## 2021-01-27 ASSESSMENT — ACTIVITIES OF DAILY LIVING (ADL)
ADLS_ACUITY_SCORE: 15
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
## 2021-01-27 NOTE — PLAN OF CARE
A/Ox1, disoriented to place, time and situation. Calm this shift. Denies pain. VSS on RA. Up ind in room. Regular diet, needs assistance with ordering. Wound care on back completed per order. Nicotine patch on L arm. No IV access. Door alarm on. Discharge pending placement.
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
## 2021-01-27 NOTE — PLAN OF CARE
Alert to self only. VSS on RA. Denies pain. Regular diet, assist with ordering. Wound on R back, changed dressing before packing strips (Iodoform Nu-gauze) were available, cleaned wound and covered with gauze & mepilex, packing strips now in room, use with next dressing change in AM. Nicotine patch on R arm. No IV access. Up independently in room, door alarm on. Discharge pending placement.

## 2021-01-27 NOTE — PLAN OF CARE
Oriented to self only, alert. Denies pain. Tolerating regular diet, requires assistance w/ ordering. Wound to R back covered w/ Mepilex, wound cares to be completed w packing strips already in room. Nicotine patch on R arm. No IV. Up ind in room, door alarm on. PRN ativan x1 for anxiety, restlessness. Discharge pending placement.

## 2021-01-28 PROCEDURE — 250N000013 HC RX MED GY IP 250 OP 250 PS 637: Performed by: PSYCHIATRY & NEUROLOGY

## 2021-01-28 PROCEDURE — 250N000013 HC RX MED GY IP 250 OP 250 PS 637: Performed by: INTERNAL MEDICINE

## 2021-01-28 PROCEDURE — 250N000013 HC RX MED GY IP 250 OP 250 PS 637: Performed by: HOSPITALIST

## 2021-01-28 PROCEDURE — 99231 SBSQ HOSP IP/OBS SF/LOW 25: CPT | Performed by: INTERNAL MEDICINE

## 2021-01-28 PROCEDURE — 120N000001 HC R&B MED SURG/OB

## 2021-01-28 PROCEDURE — 250N000013 HC RX MED GY IP 250 OP 250 PS 637: Performed by: STUDENT IN AN ORGANIZED HEALTH CARE EDUCATION/TRAINING PROGRAM

## 2021-01-28 RX ADMIN — HALOPERIDOL 10 MG: 5 TABLET ORAL at 09:39

## 2021-01-28 RX ADMIN — CEPHALEXIN 250 MG: 250 CAPSULE ORAL at 14:10

## 2021-01-28 RX ADMIN — ASPIRIN 81 MG: 81 TABLET, COATED ORAL at 09:40

## 2021-01-28 RX ADMIN — MEMANTINE 5 MG: 5 TABLET ORAL at 09:39

## 2021-01-28 RX ADMIN — CEPHALEXIN 250 MG: 250 CAPSULE ORAL at 16:54

## 2021-01-28 RX ADMIN — HALOPERIDOL 10 MG: 5 TABLET ORAL at 21:14

## 2021-01-28 RX ADMIN — CEPHALEXIN 250 MG: 250 CAPSULE ORAL at 21:14

## 2021-01-28 RX ADMIN — DOCUSATE SODIUM 100 MG: 100 CAPSULE, LIQUID FILLED ORAL at 09:40

## 2021-01-28 RX ADMIN — VENLAFAXINE HYDROCHLORIDE 75 MG: 75 CAPSULE, EXTENDED RELEASE ORAL at 09:39

## 2021-01-28 RX ADMIN — SALMETEROL XINAFOATE 1 PUFF: 50 POWDER, METERED ORAL; RESPIRATORY (INHALATION) at 21:16

## 2021-01-28 RX ADMIN — DOCUSATE SODIUM 100 MG: 100 CAPSULE, LIQUID FILLED ORAL at 21:14

## 2021-01-28 RX ADMIN — CEPHALEXIN 250 MG: 250 CAPSULE ORAL at 09:40

## 2021-01-28 RX ADMIN — ATORVASTATIN CALCIUM 80 MG: 40 TABLET, FILM COATED ORAL at 21:14

## 2021-01-28 RX ADMIN — HALOPERIDOL 10 MG: 5 TABLET ORAL at 16:54

## 2021-01-28 RX ADMIN — Medication 0.25 MG: at 21:14

## 2021-01-28 RX ADMIN — NICOTINE 1 PATCH: 7 PATCH, EXTENDED RELEASE TRANSDERMAL at 09:39

## 2021-01-28 RX ADMIN — Medication 0.25 MG: at 09:39

## 2021-01-28 RX ADMIN — LEVOTHYROXINE SODIUM 88 MCG: 88 TABLET ORAL at 09:39

## 2021-01-28 ASSESSMENT — ACTIVITIES OF DAILY LIVING (ADL)
ADLS_ACUITY_SCORE: 15

## 2021-01-28 NOTE — PLAN OF CARE
Alert and oriented to self only. Calm and cooperative overnight. Denies pain. Up independently. Continent. Regular diet, denies nausea. Dressing for back wound CDI. No IV access. Door alarm on. Discharge pending placement.

## 2021-01-28 NOTE — PLAN OF CARE
[id number] shift. Calm and cooperative. Orientated to self. Independent. Nicotine patch to L arm. Dressing to back wound CDI. Discharge plan pending.

## 2021-01-28 NOTE — PLAN OF CARE
Shift report:[id number]  Pt A&O to self and place only. Calm and cooperative this shift. Flat affect. Denies pain. Up independently in room. Continent. Regular diet, tolerating; no c/o nausea. Dressing on R back wound changed/CDI. Showered. No IV access. Door alarm on. Discharge pending placement. Will continue to monitor.
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
## 2021-01-29 PROCEDURE — 120N000001 HC R&B MED SURG/OB

## 2021-01-29 PROCEDURE — 99231 SBSQ HOSP IP/OBS SF/LOW 25: CPT | Performed by: INTERNAL MEDICINE

## 2021-01-29 PROCEDURE — 250N000013 HC RX MED GY IP 250 OP 250 PS 637: Performed by: INTERNAL MEDICINE

## 2021-01-29 PROCEDURE — 250N000013 HC RX MED GY IP 250 OP 250 PS 637: Performed by: PSYCHIATRY & NEUROLOGY

## 2021-01-29 PROCEDURE — 250N000013 HC RX MED GY IP 250 OP 250 PS 637: Performed by: HOSPITALIST

## 2021-01-29 PROCEDURE — 250N000013 HC RX MED GY IP 250 OP 250 PS 637: Performed by: STUDENT IN AN ORGANIZED HEALTH CARE EDUCATION/TRAINING PROGRAM

## 2021-01-29 PROCEDURE — G0463 HOSPITAL OUTPT CLINIC VISIT: HCPCS

## 2021-01-29 RX ADMIN — HALOPERIDOL 10 MG: 5 TABLET ORAL at 21:58

## 2021-01-29 RX ADMIN — ATORVASTATIN CALCIUM 80 MG: 40 TABLET, FILM COATED ORAL at 21:58

## 2021-01-29 RX ADMIN — Medication 0.25 MG: at 21:58

## 2021-01-29 RX ADMIN — HALOPERIDOL 10 MG: 5 TABLET ORAL at 09:01

## 2021-01-29 RX ADMIN — Medication 0.25 MG: at 09:01

## 2021-01-29 RX ADMIN — SALMETEROL XINAFOATE 1 PUFF: 50 POWDER, METERED ORAL; RESPIRATORY (INHALATION) at 09:02

## 2021-01-29 RX ADMIN — HALOPERIDOL 10 MG: 5 TABLET ORAL at 15:48

## 2021-01-29 RX ADMIN — DOCUSATE SODIUM 100 MG: 100 CAPSULE, LIQUID FILLED ORAL at 21:58

## 2021-01-29 RX ADMIN — LEVOTHYROXINE SODIUM 88 MCG: 88 TABLET ORAL at 09:01

## 2021-01-29 RX ADMIN — SALMETEROL XINAFOATE 1 PUFF: 50 POWDER, METERED ORAL; RESPIRATORY (INHALATION) at 22:02

## 2021-01-29 RX ADMIN — CEPHALEXIN 250 MG: 250 CAPSULE ORAL at 12:36

## 2021-01-29 RX ADMIN — ASPIRIN 81 MG: 81 TABLET, COATED ORAL at 09:01

## 2021-01-29 RX ADMIN — VENLAFAXINE HYDROCHLORIDE 75 MG: 75 CAPSULE, EXTENDED RELEASE ORAL at 09:01

## 2021-01-29 RX ADMIN — MEMANTINE 5 MG: 5 TABLET ORAL at 09:02

## 2021-01-29 RX ADMIN — CEPHALEXIN 250 MG: 250 CAPSULE ORAL at 09:01

## 2021-01-29 RX ADMIN — DOCUSATE SODIUM 100 MG: 100 CAPSULE, LIQUID FILLED ORAL at 09:01

## 2021-01-29 RX ADMIN — NICOTINE 1 PATCH: 7 PATCH, EXTENDED RELEASE TRANSDERMAL at 09:03

## 2021-01-29 RX ADMIN — CEPHALEXIN 250 MG: 250 CAPSULE ORAL at 17:09

## 2021-01-29 RX ADMIN — CEPHALEXIN 250 MG: 250 CAPSULE ORAL at 21:58

## 2021-01-29 ASSESSMENT — ACTIVITIES OF DAILY LIVING (ADL)
ADLS_ACUITY_SCORE: 15

## 2021-01-29 NOTE — PLAN OF CARE
A/O to self and place. Flat affect. Calm and cooperative. Denies pain. Regular diet, denies nausea. Up independently. Continent. Dressing to R back CDI. No IV access. Door alarm on. Discharge pending placement.

## 2021-01-29 NOTE — PLAN OF CARE
[id number]: Pt alert, forgetful. No complaints of pain. Frequent requests for clothes. Door alarm on pt room. Up ind. Awaiting placement for discharge.

## 2021-01-29 NOTE — PLAN OF CARE
A&O to self and place. Calm and cooperative today. Back dressing CDI, changed today. Independent in room. Door alarm on.
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
## 2021-01-29 NOTE — PROGRESS NOTES
[facility]    Medicine Progress Note - Hospitalist Service       Date of Admission:  9/9/2020    Assessment & Plan   John Juárez is a 55 year old male with PMHx of schizophrenia, hx of TBI, alcohol use disorder, COPD, hyperlipidemia and hypothyroidism who was admitted on 9/9/2020 for evaluation of aggressive behavior at his group home.  He has been evaluated by Psychiatry and his medical condition remains stable, though his group home is now unwilling to take him back and thus hospitalization has been prolonged awaiting new placement.      Neurocognitive disorder dt hx of TBI and alcohol use disorder  Schizophrenia  Under commitment  Had been residing in group home prior to admission.  Brought to hospital for evaluation of aggressive behaviors. Group home now unwilling to take him back. Has had numerous CODE 21s called this stay. Seen by psych, meds adjusted. Is currently under civil commitment and court hold through [facility] until 7/31/21. Earlier on in stay, psych had recommended geripsych placement but per evaluation on 12/18, no longer felt this was needed and recommended to continue current meds. QTc 428 on EKG on 12/18. Seen again by psych on 1/5/21 and again not felt to need inpatient psych stay.   -- On Haldol 10mg TID, memantine 5 mg daily, venlafaxine 75 mg daily, benztropine 0.25 mg BID  -- Haloperidol, olanzapine, and lorazepam PRN for acute agitation  -- Door alarm in place due to high risk for elopement  -- SW following for placement     Back cellulitis, Improving  Noted on 1/24, initiated on cephalexin at that time  - Continues on cephalexin QID, will complete 7-day course of antibiotics on 1/30  - Wound cares in place per WOCN    Baseline Hypotension  BP 90s systolic on average. Asymptomatic. No concerns     Hyperlipidemia  Chronic and stable on atorvastatin     COPD  Chronic and stable on salmeterol, albuterol prn     Hypothyroidism  Chronic and stable on  levothyroxine     Tobacco use disorder  Using nicotine patch and PRN gum     Resting tremor of upper extremities  No acute issues      Asymptomatic COVID19 screening for discharge planning: negative on 12/6/2020     Diet: Room Service  Diet  Combination Diet Regular Diet Adult; Safe Tray - with utensils    DVT Prophylaxis: Pneumatic Compression Devices  Valentin Catheter: not present  Code Status: Full Code         Disposition: Expected discharge pending placement    Elva Solano MD  Hospitalist Service  [facility]  Contact information available via [facility] Paging/Directory    ______________________________________________________________________    Interval History   No events overnight. Offers no concerns.     Data reviewed today: I reviewed all medications, new labs and imaging results over the last 24 hours. I personally reviewed no images or EKG's today.    Physical Exam   Vital Signs: Temp: 95.3  F (35.2  C) Temp src: Axillary BP: 105/72 Pulse: 68   Resp: 18 SpO2: 97 % O2 Device: None (Room air)    Weight: 182 lbs 0 oz  Constitutional: Resting comfortably, NAD  Respiratory: Breathing non-labored.  Skin/Integument: Dressing in place over back  Neuro: Alert, answers 'yes/no' appropriately, MICHAELS  Psych: Calm and cooperative    Data   Recent Labs   Lab 01/24/21  0834   WBC 6.2   HGB 14.3   MCV 95            No results found for this or any previous visit (from the past 24 hour(s)).    Medications       aspirin  81 mg Oral Daily     atorvastatin  80 mg Oral At Bedtime     benztropine  0.25 mg Oral BID     cephALEXin  250 mg Oral 4x Daily     docusate sodium  100 mg Oral BID     haloperidol  10 mg Oral TID     levothyroxine  88 mcg Oral Daily     memantine  5 mg Oral Daily     nicotine  1 patch Transdermal Daily     nicotine   Transdermal Q8H     salmeterol  1 puff Inhalation BID     venlafaxine  75 mg Oral Daily with breakfast

## 2021-01-29 NOTE — PLAN OF CARE
[id number] shift. Calm and cooperative. Orientated to self. Independent. Dressing to back wound CDI. Discharge plan pending.

## 2021-01-30 PROCEDURE — 99207 PR CDG-CUT & PASTE-POTENTIAL IMPACT ON LEVEL: CPT | Performed by: STUDENT IN AN ORGANIZED HEALTH CARE EDUCATION/TRAINING PROGRAM

## 2021-01-30 PROCEDURE — 250N000013 HC RX MED GY IP 250 OP 250 PS 637: Performed by: PSYCHIATRY & NEUROLOGY

## 2021-01-30 PROCEDURE — 250N000013 HC RX MED GY IP 250 OP 250 PS 637: Performed by: HOSPITALIST

## 2021-01-30 PROCEDURE — 250N000013 HC RX MED GY IP 250 OP 250 PS 637: Performed by: INTERNAL MEDICINE

## 2021-01-30 PROCEDURE — 99232 SBSQ HOSP IP/OBS MODERATE 35: CPT | Performed by: STUDENT IN AN ORGANIZED HEALTH CARE EDUCATION/TRAINING PROGRAM

## 2021-01-30 PROCEDURE — 120N000001 HC R&B MED SURG/OB

## 2021-01-30 RX ADMIN — Medication 0.25 MG: at 08:58

## 2021-01-30 RX ADMIN — NICOTINE 1 PATCH: 7 PATCH, EXTENDED RELEASE TRANSDERMAL at 09:04

## 2021-01-30 RX ADMIN — HALOPERIDOL 10 MG: 5 TABLET ORAL at 21:14

## 2021-01-30 RX ADMIN — CEPHALEXIN 250 MG: 250 CAPSULE ORAL at 08:58

## 2021-01-30 RX ADMIN — ATORVASTATIN CALCIUM 80 MG: 40 TABLET, FILM COATED ORAL at 21:14

## 2021-01-30 RX ADMIN — SALMETEROL XINAFOATE 1 PUFF: 50 POWDER, METERED ORAL; RESPIRATORY (INHALATION) at 13:22

## 2021-01-30 RX ADMIN — MEMANTINE 5 MG: 5 TABLET ORAL at 08:58

## 2021-01-30 RX ADMIN — HALOPERIDOL 10 MG: 5 TABLET ORAL at 08:58

## 2021-01-30 RX ADMIN — CEPHALEXIN 250 MG: 250 CAPSULE ORAL at 17:48

## 2021-01-30 RX ADMIN — VENLAFAXINE HYDROCHLORIDE 75 MG: 75 CAPSULE, EXTENDED RELEASE ORAL at 08:58

## 2021-01-30 RX ADMIN — DOCUSATE SODIUM 100 MG: 100 CAPSULE, LIQUID FILLED ORAL at 08:58

## 2021-01-30 RX ADMIN — HALOPERIDOL 10 MG: 5 TABLET ORAL at 16:03

## 2021-01-30 RX ADMIN — LEVOTHYROXINE SODIUM 88 MCG: 88 TABLET ORAL at 08:58

## 2021-01-30 RX ADMIN — Medication 0.25 MG: at 21:14

## 2021-01-30 RX ADMIN — CEPHALEXIN 250 MG: 250 CAPSULE ORAL at 13:22

## 2021-01-30 RX ADMIN — CEPHALEXIN 250 MG: 250 CAPSULE ORAL at 21:14

## 2021-01-30 RX ADMIN — DOCUSATE SODIUM 100 MG: 100 CAPSULE, LIQUID FILLED ORAL at 21:14

## 2021-01-30 RX ADMIN — ASPIRIN 81 MG: 81 TABLET, COATED ORAL at 08:58

## 2021-01-30 RX ADMIN — SALMETEROL XINAFOATE 1 PUFF: 50 POWDER, METERED ORAL; RESPIRATORY (INHALATION) at 21:17

## 2021-01-30 ASSESSMENT — ACTIVITIES OF DAILY LIVING (ADL)
ADLS_ACUITY_SCORE: 15

## 2021-01-30 NOTE — PLAN OF CARE
Pt denies any pain or discomfort, he has been in his room all day, no behavioral  concerns. No changes.

## 2021-01-30 NOTE — PROGRESS NOTES
[facility]    Medicine Progress Note - Hospitalist Service       Date of Admission:  9/9/2020  Date of Service: 01/30/2021    Assessment & Plan      John Juárez is a 55 year old male with PMHx of schizophrenia, hx of TBI, alcohol use disorder, COPD, hyperlipidemia and hypothyroidism who was admitted on 9/9/2020 for evaluation of aggressive behavior at his group home.  He has been evaluated by Psychiatry and his medical condition remains stable, though his group home is now unwilling to take him back and thus hospitalization has been prolonged awaiting new placement.      Neurocognitive disorder dt hx of TBI and alcohol use disorder  Schizophrenia  Under commitment  Had been residing in group home prior to admission.  Brought to hospital for evaluation of aggressive behaviors. Group home now unwilling to take him back. Has had numerous CODE 21s called this stay. Seen by psych, meds adjusted. Is currently under civil commitment and court hold through [facility] until 7/31/21. Earlier on in stay, psych had recommended geripsych placement but per evaluation on 12/18, no longer felt this was needed and recommended to continue current meds. QTc 428 on EKG on 12/18. Seen again by psych on 1/5/21 and again not felt to need inpatient psych stay.   -- On Haldol 10mg TID, memantine 5 mg daily, venlafaxine 75 mg daily, benztropine 0.25 mg BID  -- Haloperidol, olanzapine, and lorazepam PRN for acute agitation  -- Door alarm in place due to high risk for elopement  -- SW following for placement     Back cellulitis, Improving  Noted on 1/24, initiated on cephalexin at that time  - Continues on cephalexin QID, will complete 7-day course of antibiotics on 1/30  - Wound cares in place per WOCN    Baseline Hypotension  BP 90s systolic on average. Asymptomatic. No concerns     Hyperlipidemia  Chronic and stable on atorvastatin     COPD  Chronic and stable on salmeterol, albuterol  prn     Hypothyroidism  Chronic and stable on levothyroxine     Tobacco use disorder  Using nicotine patch and PRN gum     Resting tremor of upper extremities  No acute issues      Asymptomatic COVID19 screening for discharge planning: negative on 12/6/2020     Diet: Room Service  Diet  Combination Diet Regular Diet Adult; Safe Tray - with utensils    DVT Prophylaxis: Pneumatic Compression Devices  Valentin Catheter: not present  Code Status: Full Code         Disposition: Expected discharge pending placement    Jose Walker MD  Hospitalist Service  [facility]  Contact information available via [facility] Paging/Directory    ______________________________________________________________________    Interval History      No events overnight. Offers no concerns today    Data reviewed today: I reviewed all medications, new labs and imaging results over the last 24 hours. I personally reviewed no images or EKG's today.    Physical Exam   Vital Signs: Temp: 96.7  F (35.9  C) Temp src: Oral BP: 105/70 Pulse: 87   Resp: 16 SpO2: 97 % O2 Device: None (Room air)    Weight: 182 lbs 0 oz  Constitutional: Resting comfortably, NAD  Respiratory: Breathing non-labored.  Skin/Integument: Dressing in place over back  Neuro: Alert, answers 'yes/no' appropriately, MICHAELS  Psych: Calm and cooperative    Data   Recent Labs   Lab 01/24/21  0834   WBC 6.2   HGB 14.3   MCV 95            No results found for this or any previous visit (from the past 24 hour(s)).    Medications       aspirin  81 mg Oral Daily     atorvastatin  80 mg Oral At Bedtime     benztropine  0.25 mg Oral BID     cephALEXin  250 mg Oral 4x Daily     docusate sodium  100 mg Oral BID     haloperidol  10 mg Oral TID     levothyroxine  88 mcg Oral Daily     memantine  5 mg Oral Daily     nicotine  1 patch Transdermal Daily     nicotine   Transdermal Q8H     salmeterol  1 puff Inhalation BID     venlafaxine  75 mg Oral Daily with breakfast

## 2021-01-30 NOTE — PLAN OF CARE
Up IND. A/o to self, forgetful, impulsive. Calm and cooperative this shift. Elopement precaution w/door alarm. Will walk to nurses station with frequent requests, and return to room with re-orientation. Back wound has mepilex, CDI. Reg diet w/safe tray. Awaiting placement. Continue to monitor.

## 2021-01-31 PROCEDURE — 250N000013 HC RX MED GY IP 250 OP 250 PS 637: Performed by: HOSPITALIST

## 2021-01-31 PROCEDURE — 99207 PR CDG-CUT & PASTE-POTENTIAL IMPACT ON LEVEL: CPT | Performed by: STUDENT IN AN ORGANIZED HEALTH CARE EDUCATION/TRAINING PROGRAM

## 2021-01-31 PROCEDURE — 250N000013 HC RX MED GY IP 250 OP 250 PS 637: Performed by: INTERNAL MEDICINE

## 2021-01-31 PROCEDURE — 250N000013 HC RX MED GY IP 250 OP 250 PS 637: Performed by: PSYCHIATRY & NEUROLOGY

## 2021-01-31 PROCEDURE — 120N000001 HC R&B MED SURG/OB

## 2021-01-31 PROCEDURE — 99232 SBSQ HOSP IP/OBS MODERATE 35: CPT | Performed by: STUDENT IN AN ORGANIZED HEALTH CARE EDUCATION/TRAINING PROGRAM

## 2021-01-31 RX ADMIN — SALMETEROL XINAFOATE 1 PUFF: 50 POWDER, METERED ORAL; RESPIRATORY (INHALATION) at 21:36

## 2021-01-31 RX ADMIN — HALOPERIDOL 10 MG: 5 TABLET ORAL at 15:35

## 2021-01-31 RX ADMIN — HALOPERIDOL 10 MG: 5 TABLET ORAL at 21:36

## 2021-01-31 RX ADMIN — DOCUSATE SODIUM 100 MG: 100 CAPSULE, LIQUID FILLED ORAL at 08:08

## 2021-01-31 RX ADMIN — OLANZAPINE 5 MG: 5 TABLET, ORALLY DISINTEGRATING ORAL at 13:30

## 2021-01-31 RX ADMIN — ASPIRIN 81 MG: 81 TABLET, COATED ORAL at 08:09

## 2021-01-31 RX ADMIN — Medication 0.25 MG: at 08:08

## 2021-01-31 RX ADMIN — Medication 0.25 MG: at 21:36

## 2021-01-31 RX ADMIN — LORAZEPAM 1 MG: 1 TABLET ORAL at 16:25

## 2021-01-31 RX ADMIN — LEVOTHYROXINE SODIUM 88 MCG: 88 TABLET ORAL at 08:08

## 2021-01-31 RX ADMIN — NICOTINE 1 PATCH: 7 PATCH, EXTENDED RELEASE TRANSDERMAL at 08:09

## 2021-01-31 RX ADMIN — HALOPERIDOL 10 MG: 5 TABLET ORAL at 08:09

## 2021-01-31 RX ADMIN — SALMETEROL XINAFOATE 1 PUFF: 50 POWDER, METERED ORAL; RESPIRATORY (INHALATION) at 08:09

## 2021-01-31 RX ADMIN — VENLAFAXINE HYDROCHLORIDE 75 MG: 75 CAPSULE, EXTENDED RELEASE ORAL at 08:09

## 2021-01-31 RX ADMIN — DOCUSATE SODIUM 100 MG: 100 CAPSULE, LIQUID FILLED ORAL at 21:36

## 2021-01-31 RX ADMIN — ATORVASTATIN CALCIUM 80 MG: 40 TABLET, FILM COATED ORAL at 21:36

## 2021-01-31 RX ADMIN — MEMANTINE 5 MG: 5 TABLET ORAL at 08:08

## 2021-01-31 ASSESSMENT — ACTIVITIES OF DAILY LIVING (ADL)
ADLS_ACUITY_SCORE: 15

## 2021-01-31 NOTE — PROGRESS NOTES
[facility]    Medicine Progress Note - Hospitalist Service       Date of Admission:  9/9/2020  Date of Service: 01/31/2021    Assessment & Plan      John Juárez is a 55 year old male with PMHx of schizophrenia, hx of TBI, alcohol use disorder, COPD, hyperlipidemia and hypothyroidism who was admitted on 9/9/2020 for evaluation of aggressive behavior at his group home.  He has been evaluated by Psychiatry and his medical condition remains stable, though his group home is now unwilling to take him back and thus hospitalization has been prolonged awaiting new placement.      Neurocognitive disorder dt hx of TBI and alcohol use disorder  Schizophrenia  Under commitment  Had been residing in group home prior to admission.  Brought to hospital for evaluation of aggressive behaviors. Group home now unwilling to take him back. Has had numerous CODE 21s called this stay. Seen by psych, meds adjusted. Is currently under civil commitment and court hold through [facility] until 7/31/21. Earlier on in stay, psych had recommended geripsych placement but per evaluation on 12/18, no longer felt this was needed and recommended to continue current meds. QTc 428 on EKG on 12/18. Seen again by psych on 1/5/21 and again not felt to need inpatient psych stay.   -- On Haldol 10mg TID, memantine 5 mg daily, venlafaxine 75 mg daily, benztropine 0.25 mg BID  -- Haloperidol, olanzapine, and lorazepam PRN for acute agitation  -- Door alarm in place due to high risk for elopement  -- SW following for placement     Back cellulitis, Improving  Noted on 1/24, initiated on cephalexin at that time  - Continues on cephalexin QID, completed 7-day course of antibiotics on 1/30  - Wound cares in place per WOCN    Baseline Hypotension  BP 90s systolic on average. Asymptomatic. No concerns     Hyperlipidemia  Chronic and stable on atorvastatin     COPD  Chronic and stable on salmeterol, albuterol  prn     Hypothyroidism  Chronic and stable on levothyroxine     Tobacco use disorder  Using nicotine patch and PRN gum     Resting tremor of upper extremities  No acute issues      Asymptomatic COVID19 screening for discharge planning: negative on 12/6/2020     Diet: Room Service  Diet  Combination Diet Regular Diet Adult; Safe Tray - with utensils    DVT Prophylaxis: Pneumatic Compression Devices  Valnetin Catheter: not present  Code Status: Full Code         Disposition: Expected discharge pending placement    Jose Walker MD  Hospitalist Service  [facility]  Contact information available via [facility] Paging/Directory    ______________________________________________________________________    Interval History      No events overnight. No new complaints today.   No fevers or chills, no nausea/vomiting  No diarrhea    Data reviewed today: I reviewed all medications, new labs and imaging results over the last 24 hours. I personally reviewed no images or EKG's today.    Physical Exam   Vital Signs: Temp: 97.2  F (36.2  C) Temp src: Oral BP: 94/59 Pulse: 73   Resp: 16 SpO2: 96 % O2 Device: None (Room air)    Weight: 182 lbs 0 oz  Constitutional: Resting comfortably, NAD  Respiratory: Breathing non-labored.  Skin/Integument: Dressing in place over back  Neuro: Alert, answers 'yes/no' appropriately, MICHAELS  Psych: Calm and cooperative    Data   No lab results found in last 7 days.      No results found for this or any previous visit (from the past 24 hour(s)).    Medications       aspirin  81 mg Oral Daily     atorvastatin  80 mg Oral At Bedtime     benztropine  0.25 mg Oral BID     docusate sodium  100 mg Oral BID     haloperidol  10 mg Oral TID     levothyroxine  88 mcg Oral Daily     memantine  5 mg Oral Daily     nicotine  1 patch Transdermal Daily     nicotine   Transdermal Q8H     salmeterol  1 puff Inhalation BID     venlafaxine  75 mg Oral Daily with breakfast

## 2021-01-31 NOTE — PLAN OF CARE
A&O self, calm/cooperative. VSS. Denies pain. Independent in room, door alarm activated. Regular diet, assist with ordering. Nicotine patch in place on R arm. Wound cares to back completed per WOC orders, new dressing CDI. Plan pending.

## 2021-01-31 NOTE — PLAN OF CARE
A&O to self only. Calm and cooperative this shift. VSS. Denies pain. Up independently in room. Regular diet, needs help ordering. Nicotine patch in place on L upper arm. Wound cares to back done this shift per WOC orders, new dressing placed. Door alarm on. Discharge pending placement.

## 2021-01-31 NOTE — PLAN OF CARE
Oriented to self only. Calm and cooperative overnight. Denied pain. Up ind in room, door alarm active. Tolerating regular diet, likes snacks. Nicotine patch on L arm. Dressing in place to R back CDI. Discharge pending. Slept most of night.

## 2021-02-01 PROCEDURE — 250N000013 HC RX MED GY IP 250 OP 250 PS 637: Performed by: INTERNAL MEDICINE

## 2021-02-01 PROCEDURE — 120N000001 HC R&B MED SURG/OB

## 2021-02-01 PROCEDURE — 250N000013 HC RX MED GY IP 250 OP 250 PS 637: Performed by: HOSPITALIST

## 2021-02-01 PROCEDURE — 99231 SBSQ HOSP IP/OBS SF/LOW 25: CPT | Performed by: STUDENT IN AN ORGANIZED HEALTH CARE EDUCATION/TRAINING PROGRAM

## 2021-02-01 PROCEDURE — 250N000013 HC RX MED GY IP 250 OP 250 PS 637: Performed by: PSYCHIATRY & NEUROLOGY

## 2021-02-01 PROCEDURE — G0463 HOSPITAL OUTPT CLINIC VISIT: HCPCS

## 2021-02-01 RX ADMIN — LEVOTHYROXINE SODIUM 88 MCG: 88 TABLET ORAL at 08:44

## 2021-02-01 RX ADMIN — HALOPERIDOL 10 MG: 5 TABLET ORAL at 15:57

## 2021-02-01 RX ADMIN — HALOPERIDOL 10 MG: 5 TABLET ORAL at 21:05

## 2021-02-01 RX ADMIN — VENLAFAXINE HYDROCHLORIDE 75 MG: 75 CAPSULE, EXTENDED RELEASE ORAL at 08:44

## 2021-02-01 RX ADMIN — ASPIRIN 81 MG: 81 TABLET, COATED ORAL at 08:44

## 2021-02-01 RX ADMIN — DOCUSATE SODIUM 100 MG: 100 CAPSULE, LIQUID FILLED ORAL at 21:05

## 2021-02-01 RX ADMIN — DOCUSATE SODIUM 100 MG: 100 CAPSULE, LIQUID FILLED ORAL at 08:44

## 2021-02-01 RX ADMIN — Medication 0.25 MG: at 08:44

## 2021-02-01 RX ADMIN — NICOTINE 1 PATCH: 7 PATCH, EXTENDED RELEASE TRANSDERMAL at 08:44

## 2021-02-01 RX ADMIN — Medication 0.25 MG: at 21:05

## 2021-02-01 RX ADMIN — HALOPERIDOL 10 MG: 5 TABLET ORAL at 08:44

## 2021-02-01 RX ADMIN — MEMANTINE 5 MG: 5 TABLET ORAL at 08:44

## 2021-02-01 RX ADMIN — SALMETEROL XINAFOATE 1 PUFF: 50 POWDER, METERED ORAL; RESPIRATORY (INHALATION) at 08:44

## 2021-02-01 RX ADMIN — ATORVASTATIN CALCIUM 80 MG: 40 TABLET, FILM COATED ORAL at 21:04

## 2021-02-01 ASSESSMENT — ACTIVITIES OF DAILY LIVING (ADL)
ADLS_ACUITY_SCORE: 15

## 2021-02-01 NOTE — PLAN OF CARE
Oriented to self. Independent in room. PRN Lorazepam given x1 for anxiety and restlessness. Door alarm active. Nicotine patch present on arm. Dressing on back CDI. Awaiting placement.

## 2021-02-01 NOTE — PLAN OF CARE
BP somewhat soft. All other VSS. Disoriented to time/place/situation. Reports constipation, states he is not passing gas but refused miralax. Up independently in room. Nicotine patch on left arm. Wound care done and dressing changed on back. Plan pending placement. Continue to monitor.

## 2021-02-01 NOTE — PROGRESS NOTES
[facility] WOC Nurse Inpatient Wound Assessment    Assessment of wound(s) on pt's:  Back wound     WOC NURSE ASSESSMENT     Mid-back wound: appears to be a furuncle with small sero-sang drainage today  Wound bed filled to skin level with pinker tissue, no undermining noted today   No purulent drainage, resolving erythema  Area is healing, continue current POC. Probably healed prior to discharge     WOC NURSE TREATMENT PLAN    Wound care back: Dressing change daily  1. Clean wound with MicroKlenz  2. Cover wound bed with small piece of  Iodoform Nu-Gauze packing strip  3. Cover with Mepilex border dressing   4. Pt may shower and then complete wound care  5. Continue daily dressing changes until discharge then 3x/wk and prn. WOC will adjust frequency prior to discharge  Nursing to notify Provider(s) and re-consult the WO Nurse if wound(s) deteriorate or new skin concerns    WO Nurse follow-up plan: weekly and prn   OBJECTIVE DATA    Patient history according to provider notes:  John Juárez is a 55 year old male with PMHx of schizophrenia, hx of TBI, alcohol use disorder, COPD, hyperlipidemia and hypothyroidism who was admitted on 9/9/2020 for evaluation of aggressive behavior at his group home.  He has been evaluated by Psychiatry and his medical condition remains stable, though his group home is now unwilling to take him back and thus hospitalization has been prolonged awaiting new placement.      Neurocognitive disorder dt hx of TBI and alcohol use disorder  Schizophrenia  Under commitment  Had been residing in group home prior to admission.  Brought to hospital for evaluation of aggressive behaviors. Group home now unwilling to take him back. Has had numerous CODE 21s called this stay. Seen by psych, meds adjusted. Is currently under civil commitment and court hold through [facility] until 7/31/21. Earlier on in stay, psych had recommended geripsych placement but per evaluation on 12/18, no longer felt this  was needed and recommended to continue current meds. QTc 428 on EKG on 12/18. Seen again by psych on 1/5/21 and again not felt to need inpatient psych stay.   - On Haldol 10mg TID, memantine 5 mg daily, venlafaxine 75 mg daily, benztropine 0.25 mg BID  - Haloperidol, olanzapine, and lorazepam PRN for acute agitation  - Door alarm in place and necessary as patient has attempted elopement as recent as 1/16  - SW following for placement     Cellulitis - NEW  Assessment: midline back shows wound with mild drainage and surrounding erythema  Plan:  - Keflex QID started  - WOC     Baseline Hypotension  Systolic BP generally around 90s-100s . Asymptomatic. No concerns.     Dyslipidemia  LDL 43, HDL 32 Jan 2020.   - continue atorvastatin     COPD  Chronic and stable on RA.   - continue salmeterol, albuterol prn  Rinku Risk Assessment  Sensory Perception: 4-->no impairment    Moisture: 4-->rarely moist   Activity: 3-->walks occasionally     Mobility: 4-->no limitation   Nutrition: 3-->adequate   Friction and Shear: 3-->no apparent problem      Positioning: Rt/Lt positioning only    Mattress:  Atmos air    Current diet  Orders Placed This Encounter      Diet      Combination Diet Regular Diet Adult; Safe Tray - with utensils      Moisture Management:  BRP     Labs:   Recent Labs   Lab Test 01/24/21  0834 12/03/20  0833 12/03/20  0833 06/02/14 2018 06/02/14 2018   ALBUMIN  --   --  3.7   < > 4.0   HGB 14.3   < > 16.4   < > 14.2   INR  --   --   --   --  0.99   WBC 6.2   < > 5.4   < > 6.5    < > = values in this interval not displayed.         WOC NURSE PHYSICAL EXAM  Wound History:   First noted on 1-21-21 by Nursing staff  Date of last  WOC photo:1-29-21 2-1-20          Size:  .3cm x .3cm x .1with 100% pink base  Undermining: none today   Odor: none  Hilary-wound skin:  Dull erythema  Pain:  Denies    WOC NURSE INTERVENTIONS    Assessed   Wound Care:  Dressing changed earlier by Nursing. Re-applied current  dressing  Order updated to reflect wound can no longer be packed  Supplies: In floor supply room       Paola Fernandez RN WOCN

## 2021-02-01 NOTE — PLAN OF CARE
Oriented only to self. Denied pain. Up ind in room, door alarm active. Nicotine patch to R arm. Wound dressing in place on back. Discharge pending placement. Slept between cares.

## 2021-02-02 PROCEDURE — 250N000013 HC RX MED GY IP 250 OP 250 PS 637: Performed by: PSYCHIATRY & NEUROLOGY

## 2021-02-02 PROCEDURE — 99231 SBSQ HOSP IP/OBS SF/LOW 25: CPT | Performed by: STUDENT IN AN ORGANIZED HEALTH CARE EDUCATION/TRAINING PROGRAM

## 2021-02-02 PROCEDURE — 250N000013 HC RX MED GY IP 250 OP 250 PS 637: Performed by: INTERNAL MEDICINE

## 2021-02-02 PROCEDURE — 120N000001 HC R&B MED SURG/OB

## 2021-02-02 PROCEDURE — 250N000013 HC RX MED GY IP 250 OP 250 PS 637: Performed by: HOSPITALIST

## 2021-02-02 RX ADMIN — HALOPERIDOL 10 MG: 5 TABLET ORAL at 07:52

## 2021-02-02 RX ADMIN — DOCUSATE SODIUM 100 MG: 100 CAPSULE, LIQUID FILLED ORAL at 07:52

## 2021-02-02 RX ADMIN — LEVOTHYROXINE SODIUM 88 MCG: 88 TABLET ORAL at 07:52

## 2021-02-02 RX ADMIN — SALMETEROL XINAFOATE 1 PUFF: 50 POWDER, METERED ORAL; RESPIRATORY (INHALATION) at 07:59

## 2021-02-02 RX ADMIN — NICOTINE 1 PATCH: 7 PATCH, EXTENDED RELEASE TRANSDERMAL at 08:00

## 2021-02-02 RX ADMIN — HALOPERIDOL 10 MG: 5 TABLET ORAL at 21:34

## 2021-02-02 RX ADMIN — ATORVASTATIN CALCIUM 80 MG: 40 TABLET, FILM COATED ORAL at 21:33

## 2021-02-02 RX ADMIN — HALOPERIDOL 10 MG: 5 TABLET ORAL at 15:37

## 2021-02-02 RX ADMIN — DOCUSATE SODIUM 100 MG: 100 CAPSULE, LIQUID FILLED ORAL at 21:33

## 2021-02-02 RX ADMIN — ASPIRIN 81 MG: 81 TABLET, COATED ORAL at 07:52

## 2021-02-02 RX ADMIN — VENLAFAXINE HYDROCHLORIDE 75 MG: 75 CAPSULE, EXTENDED RELEASE ORAL at 07:52

## 2021-02-02 RX ADMIN — POLYETHYLENE GLYCOL 3350 17 G: 17 POWDER, FOR SOLUTION ORAL at 07:51

## 2021-02-02 RX ADMIN — Medication 0.25 MG: at 07:52

## 2021-02-02 RX ADMIN — MEMANTINE 5 MG: 5 TABLET ORAL at 07:52

## 2021-02-02 RX ADMIN — Medication 0.25 MG: at 21:33

## 2021-02-02 ASSESSMENT — ACTIVITIES OF DAILY LIVING (ADL)
ADLS_ACUITY_SCORE: 11

## 2021-02-02 NOTE — PLAN OF CARE
Pt alert to self only, disoriented to time, situation and place. Up independently in room. Pt cooperative with assessment and med admin. He denied any pain or discomfort. Dressing to mid-back intact and left in place. VS deferred as 1 time daily. Pt denied constipation but does not remember last BM. Discharge remains pending safe disposition. Door alarm in place.

## 2021-02-02 NOTE — PLAN OF CARE
[id number]: Alert to self. Denies pain. Ind in room, door alarm in place. Regular diet, good appetite. Needs meals ordered. No IV access. R back dressing CDI. Discharge pending [facility] facility.

## 2021-02-02 NOTE — PROGRESS NOTES
[facility]    Medicine Progress Note - Hospitalist Service       Date of Admission:  9/9/2020  Date of Service: 02/01/2021    Assessment & Plan      John Juárez is a 55 year old male with PMHx of schizophrenia, hx of TBI, alcohol use disorder, COPD, hyperlipidemia and hypothyroidism who was admitted on 9/9/2020 for evaluation of aggressive behavior at his group home.  He has been evaluated by Psychiatry and his medical condition remains stable, though his group home is now unwilling to take him back and thus hospitalization has been prolonged awaiting new placement.      Neurocognitive disorder dt hx of TBI and alcohol use disorder  Schizophrenia  Under commitment  Had been residing in group home prior to admission.  Brought to hospital for evaluation of aggressive behaviors. Group home now unwilling to take him back. Has had numerous CODE 21s called this stay. Seen by psych, meds adjusted. Is currently under civil commitment and court hold through [facility] until 7/31/21. Earlier on in stay, psych had recommended geripsych placement but per evaluation on 12/18, no longer felt this was needed and recommended to continue current meds. QTc 428 on EKG on 12/18. Seen again by psych on 1/5/21 and again not felt to need inpatient psych stay.   -- On Haldol 10mg TID, memantine 5 mg daily, venlafaxine 75 mg daily, benztropine 0.25 mg BID  -- Haloperidol, olanzapine, and lorazepam PRN for acute agitation  -- Door alarm in place due to high risk for elopement  -- SW following for placement     Back cellulitis, Improving  Noted on 1/24, initiated on cephalexin at that time  - Continues on cephalexin QID, completed 7-day course of antibiotics on 1/30  - Wound cares in place per WOCN    Baseline Hypotension  BP 90s systolic on average. Asymptomatic. No concerns     Hyperlipidemia  Chronic and stable on atorvastatin     COPD  Chronic and stable on salmeterol, albuterol  prn     Hypothyroidism  Chronic and stable on levothyroxine     Tobacco use disorder  Using nicotine patch and PRN gum     Resting tremor of upper extremities  No acute issues      Asymptomatic COVID19 screening for discharge planning: negative on 12/6/2020     Diet: Room Service  Diet  Combination Diet Regular Diet Adult; Safe Tray - with utensils    DVT Prophylaxis: Pneumatic Compression Devices  Valentin Catheter: not present  Code Status: Full Code         Disposition: Expected discharge pending placement    Jose Walker MD  Hospitalist Service  [facility]  Contact information available via [facility] Paging/Directory    ______________________________________________________________________    Interval History      No events overnight. No new complaints today.   No fevers or chills, no nausea/vomiting  No diarrhea    Data reviewed today: I reviewed all medications, new labs and imaging results over the last 24 hours. I personally reviewed no images or EKG's today.    Physical Exam   Vital Signs: Temp: 96  F (35.6  C) Temp src: Oral BP: 98/74 Pulse: 74   Resp: 16 SpO2: 98 % O2 Device: None (Room air)    Weight: 182 lbs 0 oz  Constitutional: Resting comfortably, NAD  Respiratory: Breathing non-labored.  Skin/Integument: Dressing in place over back  Neuro: Alert, answers 'yes/no' appropriately, MICHAELS  Psych: Calm and cooperative    Data   No lab results found in last 7 days.      No results found for this or any previous visit (from the past 24 hour(s)).    Medications       aspirin  81 mg Oral Daily     atorvastatin  80 mg Oral At Bedtime     benztropine  0.25 mg Oral BID     docusate sodium  100 mg Oral BID     haloperidol  10 mg Oral TID     levothyroxine  88 mcg Oral Daily     memantine  5 mg Oral Daily     nicotine  1 patch Transdermal Daily     nicotine   Transdermal Q8H     salmeterol  1 puff Inhalation BID     venlafaxine  75 mg Oral Daily with breakfast

## 2021-02-03 PROCEDURE — 250N000013 HC RX MED GY IP 250 OP 250 PS 637: Performed by: PSYCHIATRY & NEUROLOGY

## 2021-02-03 PROCEDURE — 99233 SBSQ HOSP IP/OBS HIGH 50: CPT | Performed by: HOSPITALIST

## 2021-02-03 PROCEDURE — 250N000013 HC RX MED GY IP 250 OP 250 PS 637: Performed by: HOSPITALIST

## 2021-02-03 PROCEDURE — 250N000013 HC RX MED GY IP 250 OP 250 PS 637: Performed by: INTERNAL MEDICINE

## 2021-02-03 PROCEDURE — 120N000001 HC R&B MED SURG/OB

## 2021-02-03 RX ADMIN — LEVOTHYROXINE SODIUM 88 MCG: 88 TABLET ORAL at 07:53

## 2021-02-03 RX ADMIN — HALOPERIDOL 10 MG: 5 TABLET ORAL at 07:52

## 2021-02-03 RX ADMIN — ASPIRIN 81 MG: 81 TABLET, COATED ORAL at 07:52

## 2021-02-03 RX ADMIN — NICOTINE 1 PATCH: 7 PATCH, EXTENDED RELEASE TRANSDERMAL at 07:53

## 2021-02-03 RX ADMIN — DOCUSATE SODIUM 100 MG: 100 CAPSULE, LIQUID FILLED ORAL at 07:53

## 2021-02-03 RX ADMIN — HALOPERIDOL 10 MG: 5 TABLET ORAL at 15:28

## 2021-02-03 RX ADMIN — ATORVASTATIN CALCIUM 80 MG: 40 TABLET, FILM COATED ORAL at 22:35

## 2021-02-03 RX ADMIN — HALOPERIDOL 10 MG: 5 TABLET ORAL at 22:35

## 2021-02-03 RX ADMIN — VENLAFAXINE HYDROCHLORIDE 75 MG: 75 CAPSULE, EXTENDED RELEASE ORAL at 07:52

## 2021-02-03 RX ADMIN — DOCUSATE SODIUM 100 MG: 100 CAPSULE, LIQUID FILLED ORAL at 22:35

## 2021-02-03 RX ADMIN — SALMETEROL XINAFOATE 1 PUFF: 50 POWDER, METERED ORAL; RESPIRATORY (INHALATION) at 07:57

## 2021-02-03 RX ADMIN — Medication 0.25 MG: at 22:35

## 2021-02-03 RX ADMIN — MEMANTINE 5 MG: 5 TABLET ORAL at 07:53

## 2021-02-03 RX ADMIN — Medication 0.25 MG: at 07:52

## 2021-02-03 ASSESSMENT — ACTIVITIES OF DAILY LIVING (ADL)
ADLS_ACUITY_SCORE: 11

## 2021-02-03 NOTE — PROGRESS NOTES
[facility]    Medicine Progress Note - Hospitalist Service       Date of Admission:  9/9/2020  Date of Service: 02/02/2021    Assessment & Plan      John Juárez is a 55 year old male with PMHx of schizophrenia, hx of TBI, alcohol use disorder, COPD, hyperlipidemia and hypothyroidism who was admitted on 9/9/2020 for evaluation of aggressive behavior at his group home.  He has been evaluated by Psychiatry and his medical condition remains stable, though his group home is now unwilling to take him back and thus hospitalization has been prolonged awaiting new placement.      Neurocognitive disorder dt hx of TBI and alcohol use disorder  Schizophrenia  Under commitment  Had been residing in group home prior to admission.  Brought to hospital for evaluation of aggressive behaviors. Group home now unwilling to take him back. Has had numerous CODE 21s called this stay. Seen by psych, meds adjusted. Is currently under civil commitment and court hold through [facility] until 7/31/21. Earlier on in stay, psych had recommended geripsych placement but per evaluation on 12/18, no longer felt this was needed and recommended to continue current meds. QTc 428 on EKG on 12/18. Seen again by psych on 1/5/21 and again not felt to need inpatient psych stay.   -- On Haldol 10mg TID, memantine 5 mg daily, venlafaxine 75 mg daily, benztropine 0.25 mg BID  -- Haloperidol, olanzapine, and lorazepam PRN for acute agitation  -- Door alarm in place due to high risk for elopement  -- SW following for placement     Back cellulitis, Improving  Noted on 1/24, initiated on cephalexin at that time  - Continues on cephalexin QID, completed 7-day course of antibiotics on 1/30  - Wound cares in place per WOCN    Baseline Hypotension  BP 90s systolic on average. Asymptomatic. No concerns     Hyperlipidemia  Chronic and stable on atorvastatin     COPD  Chronic and stable on salmeterol, albuterol  prn     Hypothyroidism  Chronic and stable on levothyroxine     Tobacco use disorder  Using nicotine patch and PRN gum     Resting tremor of upper extremities  No acute issues      Asymptomatic COVID19 screening for discharge planning: negative on 12/6/2020     Diet: Room Service  Diet  Combination Diet Regular Diet Adult; Safe Tray - with utensils    DVT Prophylaxis: Pneumatic Compression Devices  Valentin Catheter: not present  Code Status: Full Code         Disposition: Expected discharge pending placement    Jose Walker MD  Hospitalist Service  [facility]  Contact information available via [facility] Paging/Directory    ______________________________________________________________________    Interval History      No events overnight. No new complaints today.     Data reviewed today: I reviewed all medications, new labs and imaging results over the last 24 hours. I personally reviewed no images or EKG's today.    Physical Exam   Vital Signs: Temp: 95.2  F (35.1  C) Temp src: Axillary BP: 97/59 Pulse: 65   Resp: 16 SpO2: 97 % O2 Device: None (Room air)    Weight: 182 lbs 0 oz  Constitutional: Resting comfortably, NAD  Respiratory: Breathing non-labored.  Skin/Integument: Dressing in place over back  Neuro: Alert, answers 'yes/no' appropriately, MICHAELS  Psych: Calm and cooperative    Data   No lab results found in last 7 days.      No results found for this or any previous visit (from the past 24 hour(s)).    Medications       aspirin  81 mg Oral Daily     atorvastatin  80 mg Oral At Bedtime     benztropine  0.25 mg Oral BID     docusate sodium  100 mg Oral BID     haloperidol  10 mg Oral TID     levothyroxine  88 mcg Oral Daily     memantine  5 mg Oral Daily     nicotine  1 patch Transdermal Daily     nicotine   Transdermal Q8H     salmeterol  1 puff Inhalation BID     venlafaxine  75 mg Oral Daily with breakfast

## 2021-02-03 NOTE — PLAN OF CARE
no acute events. Calm and cooperative with assessment. No PRN's needed. Dressing to back CDI. Waiting placement.

## 2021-02-03 NOTE — PLAN OF CARE
Alert to self. VSS on RA. Denies pain. Ind in room, door alarm in place. Regular diet, needs meals ordered. Nicotine patch to R deltoid. R back dressing CDI, changed today. Discharge pending [facility] facility placement.

## 2021-02-03 NOTE — PROGRESS NOTES
[facility]    Medicine Progress Note - Hospitalist Service       Date of Admission:  9/9/2020  Date of Service: 02/03/2021    Assessment & Plan      John Juárez is a 55 year old male with PMHx of schizophrenia, hx of TBI, alcohol use disorder, COPD, hyperlipidemia and hypothyroidism who was admitted on 9/9/2020 for evaluation of aggressive behavior at his group home.  He has been evaluated by Psychiatry and his medical condition remains stable, though his group home is now unwilling to take him back and thus hospitalization has been prolonged awaiting new placement.      Neurocognitive disorder dt hx of TBI and alcohol use disorder  Schizophrenia  Under commitment  Had been residing in group home prior to admission.  Brought to hospital for evaluation of aggressive behaviors. Group home now unwilling to take him back. Has had numerous CODE 21s called this stay. Seen by psych, meds adjusted. Is currently under civil commitment and court hold through [facility] until 7/31/21. Earlier on in stay, psych had recommended geripsych placement but per evaluation on 12/18, no longer felt this was needed and recommended to continue current meds. QTc 428 on EKG on 12/18. Seen again by psych on 1/5/21 and again not felt to need inpatient psych stay.   -- On Haldol 10mg TID, memantine 5 mg daily, venlafaxine 75 mg daily, benztropine 0.25 mg BID  -- Haloperidol, olanzapine, and lorazepam PRN for acute agitation  -- Door alarm in place due to high risk for elopement  -- SW following for placement  -Patient reports no acute behavioral issues.  Continue reassurance/redirection     Back cellulitis, Improving  Noted on 1/24, initiated on cephalexin at that time  - Continues on cephalexin QID, completed 7-day course of antibiotics on 1/30  - Wound cares in place per WOCN    Baseline Hypotension  BP 90s systolic on average. Asymptomatic. No concerns     Hyperlipidemia  Chronic and stable on  "atorvastatin     COPD  Chronic and stable on salmeterol, albuterol prn     Hypothyroidism  Chronic and stable on levothyroxine     Tobacco use disorder  Using nicotine patch and PRN gum     Resting tremor of upper extremities  No acute issues      Asymptomatic COVID19 screening for discharge planning: negative on 12/6/2020     Diet: Room Service  Diet  Combination Diet Regular Diet Adult; Safe Tray - with utensils    DVT Prophylaxis: Pneumatic Compression Devices  Valentin Catheter: not present  Code Status: Full Code         Disposition: Expected discharge pending placement    Total unit/floor time 35 minutes:  time consisted of the following, examination of patient, review of records including labs, imaging results, medications, interdisciplinary notes and completing documentation; > 50% Counseling/discussion with Patientregarding current condition including current hospitalization and discharge planning. , and Coordination of Care time including Specialties, Psychiatry regarding active medical issues including behavioral issues, discharge planning      Irasema Farooq MD  Hospitalist Service  [facility]  Contact information available via [facility] Paging/Directory    ______________________________________________________________________    Interval History    Minimal history from patient, states he is comfortable without specific issues, asks when he can \"leave the hospital\".  Nursing reports no acute behavioral issues.    Data reviewed today: I reviewed all medications, new labs and imaging results over the last 24 hours.   Physical Exam   Vital Signs: Temp: 96.7  F (35.9  C) Temp src: Axillary BP: 102/67 Pulse: 77   Resp: 16 SpO2: 96 % O2 Device: None (Room air)    Weight: 182 lbs 0 oz  Constitutional:   NAD, alert, calm, cooperative    Respiratory: Breathing non-labored.  Heart:  Regular, no edema.   Skin/Integument: Dressing in place over back  Neuro: Alert, MICHAELS  Psych: Affect:  Calm " and cooperative      Medications       aspirin  81 mg Oral Daily     atorvastatin  80 mg Oral At Bedtime     benztropine  0.25 mg Oral BID     docusate sodium  100 mg Oral BID     haloperidol  10 mg Oral TID     levothyroxine  88 mcg Oral Daily     memantine  5 mg Oral Daily     nicotine  1 patch Transdermal Daily     nicotine   Transdermal Q8H     salmeterol  1 puff Inhalation BID     venlafaxine  75 mg Oral Daily with breakfast

## 2021-02-04 PROCEDURE — 99232 SBSQ HOSP IP/OBS MODERATE 35: CPT | Performed by: HOSPITALIST

## 2021-02-04 PROCEDURE — 250N000013 HC RX MED GY IP 250 OP 250 PS 637: Performed by: HOSPITALIST

## 2021-02-04 PROCEDURE — 250N000013 HC RX MED GY IP 250 OP 250 PS 637: Performed by: INTERNAL MEDICINE

## 2021-02-04 PROCEDURE — 120N000001 HC R&B MED SURG/OB

## 2021-02-04 PROCEDURE — 250N000013 HC RX MED GY IP 250 OP 250 PS 637: Performed by: PSYCHIATRY & NEUROLOGY

## 2021-02-04 RX ADMIN — NICOTINE 1 PATCH: 7 PATCH, EXTENDED RELEASE TRANSDERMAL at 08:42

## 2021-02-04 RX ADMIN — MEMANTINE 5 MG: 5 TABLET ORAL at 08:42

## 2021-02-04 RX ADMIN — DOCUSATE SODIUM 100 MG: 100 CAPSULE, LIQUID FILLED ORAL at 21:35

## 2021-02-04 RX ADMIN — Medication 0.25 MG: at 21:35

## 2021-02-04 RX ADMIN — HALOPERIDOL 10 MG: 5 TABLET ORAL at 21:35

## 2021-02-04 RX ADMIN — SALMETEROL XINAFOATE 1 PUFF: 50 POWDER, METERED ORAL; RESPIRATORY (INHALATION) at 13:10

## 2021-02-04 RX ADMIN — ASPIRIN 81 MG: 81 TABLET, COATED ORAL at 08:42

## 2021-02-04 RX ADMIN — VENLAFAXINE HYDROCHLORIDE 75 MG: 75 CAPSULE, EXTENDED RELEASE ORAL at 08:42

## 2021-02-04 RX ADMIN — HALOPERIDOL 10 MG: 5 TABLET ORAL at 08:42

## 2021-02-04 RX ADMIN — LEVOTHYROXINE SODIUM 88 MCG: 88 TABLET ORAL at 08:42

## 2021-02-04 RX ADMIN — DOCUSATE SODIUM 100 MG: 100 CAPSULE, LIQUID FILLED ORAL at 08:42

## 2021-02-04 RX ADMIN — Medication 0.25 MG: at 08:42

## 2021-02-04 RX ADMIN — HALOPERIDOL 10 MG: 5 TABLET ORAL at 15:43

## 2021-02-04 RX ADMIN — ATORVASTATIN CALCIUM 80 MG: 40 TABLET, FILM COATED ORAL at 21:34

## 2021-02-04 ASSESSMENT — ACTIVITIES OF DAILY LIVING (ADL)
ADLS_ACUITY_SCORE: 11

## 2021-02-04 NOTE — PROGRESS NOTES
[facility]    Medicine Progress Note - Hospitalist Service       Date of Admission:  9/9/2020  Date of Service: 02/04/2021    Assessment & Plan      John Juárez is a 55 year old male with PMHx of schizophrenia, hx of TBI, alcohol use disorder, COPD, hyperlipidemia and hypothyroidism who was admitted on 9/9/2020 for evaluation of aggressive behavior at his group home.  He has been evaluated by Psychiatry and his medical condition remains stable, though his group home is now unwilling to take him back and thus hospitalization has been prolonged awaiting new placement.      Neurocognitive disorder dt hx of TBI and alcohol use disorder  Schizophrenia  Under commitment  Had been residing in group home prior to admission.  Brought to hospital for evaluation of aggressive behaviors. Group home now unwilling to take him back. Has had numerous CODE 21s called this stay. Seen by psych, meds adjusted. Is currently under civil commitment and court hold through [facility] until 7/31/21. Earlier on in stay, psych had recommended geripsych placement but per evaluation on 12/18, no longer felt this was needed and recommended to continue current meds. QTc 428 on EKG on 12/18. Seen again by psych on 1/5/21 and again not felt to need inpatient psych stay.   -- On Haldol 10mg TID, memantine 5 mg daily, venlafaxine 75 mg daily, benztropine 0.25 mg BID  -- Haloperidol, olanzapine, and lorazepam PRN for acute agitation  -- Door alarm in place due to high risk for elopement  -- SW following for placement  -Nursing notes no acute behavioral issues  -Reassurance and redirection.  Encourage activities.    Back cellulitis, Improving  Noted on 1/24, initiated on cephalexin at that time  - Continues on cephalexin QID, completed 7-day course of antibiotics on 1/30  - Wound cares in place per WOCN    Baseline Hypotension  BP 90s systolic on average. Asymptomatic.      Hyperlipidemia  Chronic and stable on  atorvastatin     COPD  Chronic and stable on salmeterol, albuterol prn     Hypothyroidism  Chronic and stable on levothyroxine     Tobacco use disorder  Using nicotine patch and PRN gum     Resting tremor of upper extremities  No acute issues      Asymptomatic COVID19 screening for discharge planning: negative on 12/6/2020     Diet: Room Service  Diet  Combination Diet Regular Diet Adult; Safe Tray - with utensils    DVT Prophylaxis: Pneumatic Compression Devices  Valentin Catheter: not present  Code Status: Full Code         Disposition: Expected discharge pending placement    Irasema Farooq MD  Hospitalist Service  [facility]  Contact information available via [facility] Paging/Directory    ______________________________________________________________________    Interval History    Minimal history from patient, offers no complaints.  Eating.  Nursing reports no acute behavioral issues.      Data reviewed today: I reviewed all medications, new labs and imaging results over the last 24 hours.   Physical Exam   Vital Signs: Temp: 95.9  F (35.5  C) Temp src: Oral BP: 97/69 Pulse: 67   Resp: 16 SpO2: 97 % O2 Device: None (Room air)    Weight: 182 lbs 0 oz  Constitutional:    NAD, alert, calm, cooperative    Respiratory: Breathing non-labored.  Heart:  Regular, no edema.   Skin/Integument: Dressing in place over back  Neuro: Alert, MICHAELS  Psych: Affect:  Calm and cooperative      Medications       aspirin  81 mg Oral Daily     atorvastatin  80 mg Oral At Bedtime     benztropine  0.25 mg Oral BID     docusate sodium  100 mg Oral BID     haloperidol  10 mg Oral TID     levothyroxine  88 mcg Oral Daily     memantine  5 mg Oral Daily     nicotine  1 patch Transdermal Daily     nicotine   Transdermal Q8H     salmeterol  1 puff Inhalation BID     venlafaxine  75 mg Oral Daily with breakfast

## 2021-02-04 NOTE — PROGRESS NOTES
No acute events. VSS on RA. Pustule dressing changed per order. Redirectable with snacks/conversation. No PRNS needed. Discharge pending placement.

## 2021-02-04 NOTE — PLAN OF CARE
Uneventful shift. Confused, disoriented to place/time/situation. BP stable. Regular diet - good intake. Right back wound covered, CDI. Nicotine patch on right shoulder.

## 2021-02-04 NOTE — PLAN OF CARE
([id number]) No acute events or behaviors. VSS. Makes needs known. Calm/Cooperative. No PRN's given. Slept well overnight. Waiting on placement.

## 2021-02-04 NOTE — PLAN OF CARE
No acute events or behaviors. VSS. Makes needs known. Calm/Cooperative. No PRN's given. Back wound healing, wants to shower and I will change dressing then. Waiting on placement.

## 2021-02-05 PROCEDURE — 250N000013 HC RX MED GY IP 250 OP 250 PS 637: Performed by: INTERNAL MEDICINE

## 2021-02-05 PROCEDURE — 120N000001 HC R&B MED SURG/OB

## 2021-02-05 PROCEDURE — 250N000013 HC RX MED GY IP 250 OP 250 PS 637: Performed by: HOSPITALIST

## 2021-02-05 PROCEDURE — 99207 PR CDG-CUT & PASTE-POTENTIAL IMPACT ON LEVEL: CPT | Performed by: HOSPITALIST

## 2021-02-05 PROCEDURE — 99232 SBSQ HOSP IP/OBS MODERATE 35: CPT | Performed by: HOSPITALIST

## 2021-02-05 PROCEDURE — 250N000013 HC RX MED GY IP 250 OP 250 PS 637: Performed by: PSYCHIATRY & NEUROLOGY

## 2021-02-05 RX ADMIN — NICOTINE 1 PATCH: 7 PATCH, EXTENDED RELEASE TRANSDERMAL at 09:32

## 2021-02-05 RX ADMIN — HALOPERIDOL 10 MG: 5 TABLET ORAL at 09:25

## 2021-02-05 RX ADMIN — MEMANTINE 5 MG: 5 TABLET ORAL at 09:25

## 2021-02-05 RX ADMIN — ASPIRIN 81 MG: 81 TABLET, COATED ORAL at 09:26

## 2021-02-05 RX ADMIN — HALOPERIDOL 5 MG: 5 TABLET ORAL at 19:50

## 2021-02-05 RX ADMIN — HALOPERIDOL 10 MG: 5 TABLET ORAL at 16:02

## 2021-02-05 RX ADMIN — Medication 0.25 MG: at 19:49

## 2021-02-05 RX ADMIN — ATORVASTATIN CALCIUM 80 MG: 40 TABLET, FILM COATED ORAL at 19:49

## 2021-02-05 RX ADMIN — VENLAFAXINE HYDROCHLORIDE 75 MG: 75 CAPSULE, EXTENDED RELEASE ORAL at 09:25

## 2021-02-05 RX ADMIN — LEVOTHYROXINE SODIUM 88 MCG: 88 TABLET ORAL at 09:26

## 2021-02-05 RX ADMIN — DOCUSATE SODIUM 100 MG: 100 CAPSULE, LIQUID FILLED ORAL at 09:25

## 2021-02-05 RX ADMIN — DOCUSATE SODIUM 100 MG: 100 CAPSULE, LIQUID FILLED ORAL at 19:49

## 2021-02-05 RX ADMIN — Medication 0.25 MG: at 09:25

## 2021-02-05 ASSESSMENT — ACTIVITIES OF DAILY LIVING (ADL)
ADLS_ACUITY_SCORE: 11

## 2021-02-05 NOTE — PROGRESS NOTES
[facility]    Medicine Progress Note - Hospitalist Service       Date of Admission:  9/9/2020  Date of Service: 02/05/2021    Assessment & Plan      John Juárez is a 55 year old male with PMHx of schizophrenia, hx of TBI, alcohol use disorder, COPD, hyperlipidemia and hypothyroidism who was admitted on 9/9/2020 for evaluation of aggressive behavior at his group home.  He has been evaluated by Psychiatry and his medical condition remains stable, though his group home is now unwilling to take him back and thus hospitalization has been prolonged awaiting new placement.      Neurocognitive disorder dt hx of TBI and alcohol use disorder  Schizophrenia  Under commitment  Had been residing in group home prior to admission.  Brought to hospital for evaluation of aggressive behaviors. Group home now unwilling to take him back. Has had numerous CODE 21s called this stay. Seen by psych, meds adjusted. Is currently under civil commitment and court hold through [facility] until 7/31/21. Earlier on in stay, psych had recommended geripsych placement but per evaluation on 12/18, no longer felt this was needed and recommended to continue current meds. QTc 428 on EKG on 12/18. Seen again by psych on 1/5/21 and again not felt to need inpatient psych stay.   -- On Haldol 10mg TID, memantine 5 mg daily, venlafaxine 75 mg daily, benztropine 0.25 mg BID  -- Haloperidol, olanzapine, and lorazepam PRN for acute agitation  -- Door alarm in place due to high risk for elopement  -- SW following for placement  -Nursing notes no acute behavioral issues.  -Reassurance and redirection.  Encourage activities.  Eating.  -last labs 1/24/21; update CBC, BMP    Back cellulitis, Improving  Noted on 1/24, initiated on cephalexin at that time  - Continues on cephalexin QID, completed 7-day course of antibiotics on 1/30  - Wound cares in place per WOCN.  Nursing notes no acute wound/skin issues.    Baseline  Hypotension  BP 90s systolic on average. Asymptomatic.      Hyperlipidemia  Chronic and stable on atorvastatin     COPD  Chronic and stable on salmeterol, albuterol prn.  No pulmonary issues.     Hypothyroidism  Chronic and stable on levothyroxine     Tobacco use disorder  Using nicotine patch and PRN gum     Resting tremor of upper extremities  No acute issues      Asymptomatic COVID19 screening for discharge planning: negative on 12/6/2020     Diet: Room Service  Diet  Combination Diet Regular Diet Adult; Safe Tray - with utensils    DVT Prophylaxis: Pneumatic Compression Devices  Valentin Catheter: not present  Code Status: Full Code         Disposition: Expected discharge pending placement    Irasema Farooq MD  Hospitalist Service  [facility]  Contact information available via [facility] Paging/Directory    ______________________________________________________________________    Interval History    Sleepy on encounter is yet arousable.  Offers no complaints.  Generally nods in response to questioning.  Nursing reports no acute behavioral issues.      Data reviewed today: I reviewed all medications, new labs and imaging results over the last 24 hours.   Physical Exam   Vital Signs: Temp: 96.1  F (35.6  C) Temp src: Axillary BP: 96/66 Pulse: 73   Resp: 16 SpO2: 91 % O2 Device: None (Room air)    Weight: 182 lbs 0 oz  Constitutional:     NAD, sleepy yet arousable,, calm, cooperative    Respiratory: Breathing non-labored.  Heart:  Regular, no edema.   Skin/Integument: Dressing in place over back  Neuro: Alert, MICHAELS  Psych: Affect:  Calm and cooperative      Medications       aspirin  81 mg Oral Daily     atorvastatin  80 mg Oral At Bedtime     benztropine  0.25 mg Oral BID     docusate sodium  100 mg Oral BID     haloperidol  10 mg Oral TID     levothyroxine  88 mcg Oral Daily     memantine  5 mg Oral Daily     nicotine  1 patch Transdermal Daily     nicotine   Transdermal Q8H     salmeterol   1 puff Inhalation BID     venlafaxine  75 mg Oral Daily with breakfast

## 2021-02-05 NOTE — PLAN OF CARE
Uneventful shift. Disoriented to place/situation. VSS, BP soft. Regular diet - good intake. Right back sore, covered, CDI. Nicotine patch on left shoulder.

## 2021-02-05 NOTE — PLAN OF CARE
Uneventful shift. Disoriented to place. VSS . Regular diet - good intake. Right back sore, covered, CDI. Nicotine patch on right shoulder.

## 2021-02-06 LAB
ANION GAP SERPL CALCULATED.3IONS-SCNC: 7 MMOL/L (ref 3–14)
BUN SERPL-MCNC: 20 MG/DL (ref 7–30)
CALCIUM SERPL-MCNC: 8.6 MG/DL (ref 8.5–10.1)
CHLORIDE SERPL-SCNC: 110 MMOL/L (ref 94–109)
CO2 SERPL-SCNC: 24 MMOL/L (ref 20–32)
CREAT SERPL-MCNC: 0.86 MG/DL (ref 0.66–1.25)
ERYTHROCYTE [DISTWIDTH] IN BLOOD BY AUTOMATED COUNT: 13.6 % (ref 10–15)
GFR SERPL CREATININE-BSD FRML MDRD: >90 ML/MIN/{1.73_M2}
GLUCOSE SERPL-MCNC: 77 MG/DL (ref 70–99)
HCT VFR BLD AUTO: 46.7 % (ref 40–53)
HGB BLD-MCNC: 15.9 G/DL (ref 13.3–17.7)
MCH RBC QN AUTO: 32.3 PG (ref 26.5–33)
MCHC RBC AUTO-ENTMCNC: 34 G/DL (ref 31.5–36.5)
MCV RBC AUTO: 95 FL (ref 78–100)
PLATELET # BLD AUTO: 167 10E9/L (ref 150–450)
POTASSIUM SERPL-SCNC: 4.1 MMOL/L (ref 3.4–5.3)
RBC # BLD AUTO: 4.92 10E12/L (ref 4.4–5.9)
SODIUM SERPL-SCNC: 141 MMOL/L (ref 133–144)
WBC # BLD AUTO: 5.9 10E9/L (ref 4–11)

## 2021-02-06 PROCEDURE — 99207 PR CDG-CUT & PASTE-POTENTIAL IMPACT ON LEVEL: CPT | Performed by: HOSPITALIST

## 2021-02-06 PROCEDURE — 250N000013 HC RX MED GY IP 250 OP 250 PS 637: Performed by: INTERNAL MEDICINE

## 2021-02-06 PROCEDURE — 250N000013 HC RX MED GY IP 250 OP 250 PS 637: Performed by: HOSPITALIST

## 2021-02-06 PROCEDURE — 120N000001 HC R&B MED SURG/OB

## 2021-02-06 PROCEDURE — 85027 COMPLETE CBC AUTOMATED: CPT | Performed by: HOSPITALIST

## 2021-02-06 PROCEDURE — 99232 SBSQ HOSP IP/OBS MODERATE 35: CPT | Performed by: HOSPITALIST

## 2021-02-06 PROCEDURE — 80048 BASIC METABOLIC PNL TOTAL CA: CPT | Performed by: HOSPITALIST

## 2021-02-06 PROCEDURE — 36415 COLL VENOUS BLD VENIPUNCTURE: CPT | Performed by: HOSPITALIST

## 2021-02-06 PROCEDURE — 250N000013 HC RX MED GY IP 250 OP 250 PS 637: Performed by: PSYCHIATRY & NEUROLOGY

## 2021-02-06 RX ADMIN — HALOPERIDOL 10 MG: 5 TABLET ORAL at 22:04

## 2021-02-06 RX ADMIN — DOCUSATE SODIUM 100 MG: 100 CAPSULE, LIQUID FILLED ORAL at 09:05

## 2021-02-06 RX ADMIN — Medication 0.25 MG: at 22:04

## 2021-02-06 RX ADMIN — MEMANTINE 5 MG: 5 TABLET ORAL at 09:05

## 2021-02-06 RX ADMIN — SALMETEROL XINAFOATE 1 PUFF: 50 POWDER, METERED ORAL; RESPIRATORY (INHALATION) at 22:01

## 2021-02-06 RX ADMIN — ASPIRIN 81 MG: 81 TABLET, COATED ORAL at 09:05

## 2021-02-06 RX ADMIN — ATORVASTATIN CALCIUM 80 MG: 40 TABLET, FILM COATED ORAL at 22:04

## 2021-02-06 RX ADMIN — DOCUSATE SODIUM 100 MG: 100 CAPSULE, LIQUID FILLED ORAL at 22:04

## 2021-02-06 RX ADMIN — NICOTINE 1 PATCH: 7 PATCH, EXTENDED RELEASE TRANSDERMAL at 09:07

## 2021-02-06 RX ADMIN — HALOPERIDOL 10 MG: 5 TABLET ORAL at 09:05

## 2021-02-06 RX ADMIN — Medication 0.25 MG: at 09:04

## 2021-02-06 RX ADMIN — HALOPERIDOL 10 MG: 5 TABLET ORAL at 16:35

## 2021-02-06 RX ADMIN — LEVOTHYROXINE SODIUM 88 MCG: 88 TABLET ORAL at 09:05

## 2021-02-06 RX ADMIN — SALMETEROL XINAFOATE 1 PUFF: 50 POWDER, METERED ORAL; RESPIRATORY (INHALATION) at 09:07

## 2021-02-06 RX ADMIN — VENLAFAXINE HYDROCHLORIDE 75 MG: 75 CAPSULE, EXTENDED RELEASE ORAL at 09:05

## 2021-02-06 ASSESSMENT — ACTIVITIES OF DAILY LIVING (ADL)
ADLS_ACUITY_SCORE: 11

## 2021-02-06 NOTE — PLAN OF CARE
[id number]. Pt is A and O to self. Calm and cooperative. VSS on RA, and unchanged from previous shift.  Denies pain.  Regular diet, good appetite.   Nicotine patch to L shoulder. Back soar covered with Mepilex, CDI.  Ind in the room. Door alarm in place. Discharge pending placement.

## 2021-02-06 NOTE — PLAN OF CARE
[id number]: Pt a/o to self. Somewhat restless this evening, setting off door alarm frequently. However, patient cooperative. Gave prn haldol x1. Denied pain/SOB/or nausea. Nicotine patch to left shoulder. Sore on upper back covered with mepilex. Independent in room. Door alarm in place. Discharge pending placement.

## 2021-02-06 NOTE — PROGRESS NOTES
[facility]    Medicine Progress Note - Hospitalist Service       Date of Admission:  9/9/2020  Date of Service: 02/06/2021    Assessment & Plan      John Juárez is a 55 year old male with PMHx of schizophrenia, hx of TBI, alcohol use disorder, COPD, hyperlipidemia and hypothyroidism who was admitted on 9/9/2020 for evaluation of aggressive behavior at his group home.  He has been evaluated by Psychiatry and his medical condition remains stable, though his group home is now unwilling to take him back and thus hospitalization has been prolonged awaiting new placement.      Neurocognitive disorder dt hx of TBI and alcohol use disorder  Schizophrenia  Under commitment  Had been residing in group home prior to admission.  Brought to hospital for evaluation of aggressive behaviors. Group home now unwilling to take him back. Has had numerous CODE 21s called this stay. Seen by psych, meds adjusted. Is currently under civil commitment and court hold through [facility] until 7/31/21. Earlier on in stay, psych had recommended geripsych placement but per evaluation on 12/18, no longer felt this was needed and recommended to continue current meds. QTc 428 on EKG on 12/18. Seen again by psych on 1/5/21 and again not felt to need inpatient psych stay.   -- On Haldol 10mg TID, memantine 5 mg daily, venlafaxine 75 mg daily, benztropine 0.25 mg BID  -- Haloperidol, olanzapine, and lorazepam PRN for acute agitation  -- Door alarm in place due to high risk for elopement  -- SW following for placement  -Nursing reports no acute behavioral issues  -Reassurance and redirection.  Encourage activities.  Eating.  -last labs 1/24/21; updated CBC and BMP today WNL.    Back cellulitis, Improving  Noted on 1/24, initiated on cephalexin at that time  -  completed 7-day course of cephalexin on 1/30  - Wound cares in place per WOCN.  Nursing notes no acute wound/skin issues.    Baseline Hypotension  BP 90s  systolic on average. Asymptomatic.      Hyperlipidemia  Chronic and stable on atorvastatin     COPD  Chronic and stable on salmeterol, albuterol prn.  No pulmonary issues.     Hypothyroidism  Chronic and stable on levothyroxine     Tobacco use disorder  Using nicotine patch and PRN gum     Resting tremor of upper extremities  No acute issues      Asymptomatic COVID19 screening for discharge planning: negative on 12/6/2020     Diet: Room Service  Diet  Combination Diet Regular Diet Adult; Safe Tray - with utensils    DVT Prophylaxis: Pneumatic Compression Devices  Valentin Catheter: not present  Code Status: Full Code         Disposition: Expected discharge pending placement    Irasema Farooq MD  Hospitalist Service  [facility]  Contact information available via [facility] Paging/Directory    ______________________________________________________________________    Interval History    Patient offers no complaints, minimal interaction.  Nods in response to questioning.  Nursing reports no acute behavioral issues.      Data reviewed today: I reviewed all medications, new labs and imaging results over the last 24 hours.     Physical Exam   Vital Signs: Temp: 96.5  F (35.8  C) Temp src: Oral BP: 106/67 Pulse: 70   Resp: 16 SpO2: 96 % O2 Device: None (Room air)    Weight: 182 lbs 0 oz  Constitutional:     NAD, sleepy yet arousable.  Calm, cooperative    Respiratory: Breathing non-labored.  Heart:  Regular, no edema.   Skin/Integument: Dressing in place over back  Neuro: Alert, MICHAELS  Psych: Affect:  Calm and cooperative      Medications       aspirin  81 mg Oral Daily     atorvastatin  80 mg Oral At Bedtime     benztropine  0.25 mg Oral BID     docusate sodium  100 mg Oral BID     haloperidol  10 mg Oral TID     levothyroxine  88 mcg Oral Daily     memantine  5 mg Oral Daily     nicotine  1 patch Transdermal Daily     nicotine   Transdermal Q8H     salmeterol  1 puff Inhalation BID     venlafaxine  75  mg Oral Daily with breakfast

## 2021-02-07 PROCEDURE — 250N000013 HC RX MED GY IP 250 OP 250 PS 637: Performed by: HOSPITALIST

## 2021-02-07 PROCEDURE — 99207 PR CDG-CUT & PASTE-POTENTIAL IMPACT ON LEVEL: CPT | Performed by: HOSPITALIST

## 2021-02-07 PROCEDURE — 250N000013 HC RX MED GY IP 250 OP 250 PS 637: Performed by: INTERNAL MEDICINE

## 2021-02-07 PROCEDURE — 99232 SBSQ HOSP IP/OBS MODERATE 35: CPT | Performed by: HOSPITALIST

## 2021-02-07 PROCEDURE — 120N000001 HC R&B MED SURG/OB

## 2021-02-07 PROCEDURE — 250N000013 HC RX MED GY IP 250 OP 250 PS 637: Performed by: PSYCHIATRY & NEUROLOGY

## 2021-02-07 RX ADMIN — Medication 0.25 MG: at 20:15

## 2021-02-07 RX ADMIN — Medication 0.25 MG: at 08:52

## 2021-02-07 RX ADMIN — ASPIRIN 81 MG: 81 TABLET, COATED ORAL at 08:52

## 2021-02-07 RX ADMIN — DOCUSATE SODIUM 100 MG: 100 CAPSULE, LIQUID FILLED ORAL at 08:52

## 2021-02-07 RX ADMIN — VENLAFAXINE HYDROCHLORIDE 75 MG: 75 CAPSULE, EXTENDED RELEASE ORAL at 08:52

## 2021-02-07 RX ADMIN — MEMANTINE 5 MG: 5 TABLET ORAL at 08:52

## 2021-02-07 RX ADMIN — HALOPERIDOL 10 MG: 5 TABLET ORAL at 16:12

## 2021-02-07 RX ADMIN — ATORVASTATIN CALCIUM 80 MG: 40 TABLET, FILM COATED ORAL at 20:15

## 2021-02-07 RX ADMIN — HALOPERIDOL 10 MG: 5 TABLET ORAL at 08:52

## 2021-02-07 RX ADMIN — OLANZAPINE 5 MG: 5 TABLET, ORALLY DISINTEGRATING ORAL at 18:02

## 2021-02-07 RX ADMIN — NICOTINE 1 PATCH: 7 PATCH, EXTENDED RELEASE TRANSDERMAL at 09:00

## 2021-02-07 RX ADMIN — HALOPERIDOL 10 MG: 5 TABLET ORAL at 20:15

## 2021-02-07 RX ADMIN — LEVOTHYROXINE SODIUM 88 MCG: 88 TABLET ORAL at 08:52

## 2021-02-07 RX ADMIN — SALMETEROL XINAFOATE 1 PUFF: 50 POWDER, METERED ORAL; RESPIRATORY (INHALATION) at 20:15

## 2021-02-07 RX ADMIN — DOCUSATE SODIUM 100 MG: 100 CAPSULE, LIQUID FILLED ORAL at 20:15

## 2021-02-07 RX ADMIN — SALMETEROL XINAFOATE 1 PUFF: 50 POWDER, METERED ORAL; RESPIRATORY (INHALATION) at 08:54

## 2021-02-07 ASSESSMENT — ACTIVITIES OF DAILY LIVING (ADL)
ADLS_ACUITY_SCORE: 11

## 2021-02-07 NOTE — PLAN OF CARE
Patient alert to self, slept throughout night. Up independently. Denies pain. Wound on back covered with foam dressing. Door alarm in place with no acute events overnight.

## 2021-02-07 NOTE — PROGRESS NOTES
[facility]    Medicine Progress Note - Hospitalist Service       Date of Admission:  9/9/2020  Date of Service: 02/07/2021    Assessment & Plan      John Juárez is a 55 year old male with PMHx of schizophrenia, hx of TBI, alcohol use disorder, COPD, hyperlipidemia and hypothyroidism who was admitted on 9/9/2020 for evaluation of aggressive behavior at his group home.  He has been evaluated by Psychiatry and his medical condition remains stable, though his group home is now unwilling to take him back and thus hospitalization has been prolonged awaiting new placement.      Neurocognitive disorder dt hx of TBI and alcohol use disorder  Schizophrenia  Under commitment  Had been residing in group home prior to admission.  Brought to hospital for evaluation of aggressive behaviors. Group home now unwilling to take him back. Has had numerous CODE 21s called this stay. Seen by psych, meds adjusted. Is currently under civil commitment and court hold through [facility] until 7/31/21. Earlier on in stay, psych had recommended geripsych placement but per evaluation on 12/18, no longer felt this was needed and recommended to continue current meds. QTc 428 on EKG on 12/18. Seen again by psych on 1/5/21 and again not felt to need inpatient psych stay.   -- On Haldol 10mg TID, memantine 5 mg daily, venlafaxine 75 mg daily, benztropine 0.25 mg BID  -- Haloperidol, olanzapine, and lorazepam PRN for acute agitation  -- Door alarm in place due to high risk for elopement  -- SW following for placement  -Updated labs 2/6/21 CBC, BMP WNL  -Nursing reports no acute behavioral issues.  -Continue reassurance/redirection. Encourage activities.  Eating.  -    Back cellulitis, Improving  Noted on 1/24, initiated on cephalexin at that time  -  completed 7-day course of cephalexin on 1/30  - Wound cares in place per WOCN.  Nursing notes no acute wound/skin issues.    Baseline Hypotension  BP 90s systolic on  average. Asymptomatic.      Hyperlipidemia  Chronic and stable on atorvastatin     COPD  Chronic and stable on salmeterol, albuterol prn.  No pulmonary issues.     Hypothyroidism  Chronic and stable on levothyroxine     Tobacco use disorder  Using nicotine patch and PRN gum     Resting tremor of upper extremities  No acute issues      Asymptomatic COVID19 screening for discharge planning: negative on 12/6/2020     Diet: Room Service  Diet  Combination Diet Regular Diet Adult; Safe Tray - with utensils    DVT Prophylaxis: Pneumatic Compression Devices  Valentin Catheter: not present  Code Status: Full Code         Disposition: Expected discharge pending placement    Irasema Farooq MD  Hospitalist Service  [facility]  Contact information available via [facility] Paging/Directory    ______________________________________________________________________    Interval History    Minimal history from patient, minimal interaction, nods in response to questioning, offers no complaints. Nursing reports no acute behavioral issues.     Data reviewed today: I reviewed all medications, new labs and imaging results over the last 24 hours.     Physical Exam   Vital Signs: Temp: 97.3  F (36.3  C) Temp src: Oral BP: 98/68 Pulse: 72   Resp: 16 SpO2: 94 % O2 Device: None (Room air)    Weight: 182 lbs 0 oz  Constitutional:     NAD, sleepy on morning encounter yet arousable. Calm, cooperative    Respiratory: Breathing non-labored.  Heart:  Regular, no edema.   Skin/Integument: Dressing in place over back  Neuro: Alert, MICHAELS  Psych: Affect: Calm, cooperative      Medications       aspirin  81 mg Oral Daily     atorvastatin  80 mg Oral At Bedtime     benztropine  0.25 mg Oral BID     docusate sodium  100 mg Oral BID     haloperidol  10 mg Oral TID     levothyroxine  88 mcg Oral Daily     memantine  5 mg Oral Daily     nicotine  1 patch Transdermal Daily     nicotine   Transdermal Q8H     salmeterol  1 puff Inhalation  BID     venlafaxine  75 mg Oral Daily with breakfast

## 2021-02-07 NOTE — PLAN OF CARE
Pt is A and O to self. Calm and cooperative today. VSS on RA,  Denies pain.  Regular diet, good appetite.   Nicotine patch to R shoulder. Back wound cares completed per POC: Mepilex CDI.  Ind in the room. Door alarm in place. Discharge pending placement.

## 2021-02-08 PROCEDURE — 120N000001 HC R&B MED SURG/OB

## 2021-02-08 PROCEDURE — 250N000013 HC RX MED GY IP 250 OP 250 PS 637: Performed by: PSYCHIATRY & NEUROLOGY

## 2021-02-08 PROCEDURE — 99232 SBSQ HOSP IP/OBS MODERATE 35: CPT | Performed by: HOSPITALIST

## 2021-02-08 PROCEDURE — 250N000013 HC RX MED GY IP 250 OP 250 PS 637: Performed by: INTERNAL MEDICINE

## 2021-02-08 PROCEDURE — 250N000013 HC RX MED GY IP 250 OP 250 PS 637: Performed by: HOSPITALIST

## 2021-02-08 RX ADMIN — ATORVASTATIN CALCIUM 80 MG: 40 TABLET, FILM COATED ORAL at 21:19

## 2021-02-08 RX ADMIN — LORAZEPAM 1 MG: 1 TABLET ORAL at 19:52

## 2021-02-08 RX ADMIN — SALMETEROL XINAFOATE 1 PUFF: 50 POWDER, METERED ORAL; RESPIRATORY (INHALATION) at 21:19

## 2021-02-08 RX ADMIN — Medication 0.25 MG: at 08:18

## 2021-02-08 RX ADMIN — SALMETEROL XINAFOATE 1 PUFF: 50 POWDER, METERED ORAL; RESPIRATORY (INHALATION) at 08:18

## 2021-02-08 RX ADMIN — LEVOTHYROXINE SODIUM 88 MCG: 88 TABLET ORAL at 08:18

## 2021-02-08 RX ADMIN — OLANZAPINE 10 MG: 5 TABLET, ORALLY DISINTEGRATING ORAL at 18:43

## 2021-02-08 RX ADMIN — Medication 0.25 MG: at 21:19

## 2021-02-08 RX ADMIN — HALOPERIDOL 10 MG: 5 TABLET ORAL at 08:18

## 2021-02-08 RX ADMIN — HALOPERIDOL 10 MG: 5 TABLET ORAL at 21:19

## 2021-02-08 RX ADMIN — DOCUSATE SODIUM 100 MG: 100 CAPSULE, LIQUID FILLED ORAL at 21:19

## 2021-02-08 RX ADMIN — VENLAFAXINE HYDROCHLORIDE 75 MG: 75 CAPSULE, EXTENDED RELEASE ORAL at 08:18

## 2021-02-08 RX ADMIN — ASPIRIN 81 MG: 81 TABLET, COATED ORAL at 08:18

## 2021-02-08 RX ADMIN — NICOTINE 1 PATCH: 7 PATCH, EXTENDED RELEASE TRANSDERMAL at 08:19

## 2021-02-08 RX ADMIN — MEMANTINE 5 MG: 5 TABLET ORAL at 08:18

## 2021-02-08 RX ADMIN — DOCUSATE SODIUM 100 MG: 100 CAPSULE, LIQUID FILLED ORAL at 08:18

## 2021-02-08 RX ADMIN — HALOPERIDOL 10 MG: 5 TABLET ORAL at 16:12

## 2021-02-08 ASSESSMENT — ACTIVITIES OF DAILY LIVING (ADL)
ADLS_ACUITY_SCORE: 11

## 2021-02-08 NOTE — PROGRESS NOTES
[facility]    Medicine Progress Note - Hospitalist Service       Date of Admission:  9/9/2020  Assessment & Plan       John Juárez is a 55 year old male with PMHx of schizophrenia, hx of TBI, alcohol use disorder, COPD, hyperlipidemia and hypothyroidism who was admitted on 9/9/2020 for evaluation of aggressive behavior at his group home.  He has been evaluated by Psychiatry and his medical condition remains stable, though his group home is now unwilling to take him back and thus hospitalization has been prolonged awaiting new placement.      Neurocognitive disorder dt hx of TBI and alcohol use disorder  Schizophrenia  Under commitment  Had been residing in group home prior to admission.  Brought to hospital for evaluation of aggressive behaviors. Group home now unwilling to take him back. Has had numerous CODE 21s called this stay. Seen by psych, meds adjusted. Is currently under civil commitment and court hold through [facility] until 7/31/21. Earlier on in stay, psych had recommended geripsych placement but per evaluation on 12/18, no longer felt this was needed and recommended to continue current meds. QTc 428 on EKG on 12/18. Seen again by psych on 1/5/21 and again not felt to need inpatient psych stay.   -- On Haldol 10mg TID, memantine 5 mg daily, venlafaxine 75 mg daily, benztropine 0.25 mg BID  -- Haloperidol, olanzapine, and lorazepam PRN for acute agitation  -- Door alarm in place due to high risk for elopement  -- SW following for placement  - Updated labs 2/6/21 CBC, BMP WNL  - Nursing reports no acute behavioral issues.  - Continue reassurance/redirection as necessary.      Back cellulitis, Improving  Noted on 1/24, initiated on cephalexin at that time  - completed 7-day course of cephalexin on 1/30  - Wound cares in place per WOCN.  Nursing notes no acute wound/skin issues.     Baseline Hypotension  BP 90s systolic on average. Asymptomatic.       Hyperlipidemia  Chronic and stable on atorvastatin     COPD  Chronic and stable on salmeterol, albuterol prn.  No pulmonary issues.     Hypothyroidism  Chronic and stable on levothyroxine     Tobacco use disorder  Using nicotine patch and PRN gum     Resting tremor of upper extremities  No acute issues      Asymptomatic COVID19 screening for discharge planning: negative PCR on 12/6/2020      Diet: Room Service  Diet  Combination Diet Regular Diet Adult; Safe Tray - with utensils    DVT Prophylaxis: Pneumatic Compression Devices and Ambulate every shift  Valentin Catheter: not present  Code Status: Full Code           Disposition Plan   Expected discharge: pending placement  Entered: Sanjay Cantu MD 02/08/2021, 10:20 AM       The patient's care was discussed with the Bedside Nurse, Care Coordinator/ and Patient.    Sanjay Cantu MD  Hospitalist Service  [facility]  Contact information available via [facility] Paging/Directory    ______________________________________________________________________    Interval History   Chart reviewed, care assumed today. Familiar to me from earlier in hospitalization. Patient seen and briefly examined. No new issues or complaints - watching tv on my arrival. Denies sob or pain.    Data reviewed today: I reviewed all medications, new labs and imaging results over the last 24 hours. I personally reviewed no images or EKG's today.    Physical Exam   Vital Signs: Temp: 98.6  F (37  C) Temp src: Oral BP: 122/56 Pulse: 72   Resp: 16 SpO2: 96 % O2 Device: None (Room air)    Weight: 182 lbs 0 oz    Gen: NAD, pleasant  HEENT: Normocephalic, EOMI, MMM  Resp: no crackles,  no wheezes, no increased work of resp  CV: S1S2 heard, reg rhythm, reg rate, no pitting pedal edema  Abdo: soft, nontender, nondistended, bowel sounds present  Ext: calves nontender, well perfused  Neuro: awake and alert, oriented to self, moving all extremities at will      Data    NNL

## 2021-02-08 NOTE — PLAN OF CARE
Patient is oriented to self. Denies pain and nausea. Calm and cooperative. Tolerating regular diet. Up independently. Left shoulder nicotine patch. Back wound with foam dressing. Calls appropriately.

## 2021-02-08 NOTE — PLAN OF CARE
Pt is A and O to self. One episode of agitation this evening, managed with prn Zyprexa. VSS on RA,  Denies pain.  Regular diet, adequate appetite.  Nicotine patch to L shoulder. Back wound cares completed per POC: Mepilex  changed and CDI.  Ind in the room. Door alarm in place. Discharge pending placement.

## 2021-02-08 NOTE — PROGRESS NOTES
Care Management Follow Up    Length of Stay (days): 142    Expected Discharge Date: (unable to give an estimated d/c date)     Concerns to be Addressed: patient refuses services, discharge planning     Patient plan of care discussed at interdisciplinary rounds: Yes    Anticipated Discharge Disposition: Other (Comments)     Anticipated Discharge Services: None  Anticipated Discharge DME: None    Patient/family educated on Medicare website which has current facility and service quality ratings: (not applicable)  Education Provided on the Discharge Plan:    Patient/Family in Agreement with the Plan: no    Referrals Placed by CM/SW: Post Acute Facilities  Private pay costs discussed: Not applicable    Additional Information:   and Kurtis Chapin, the behavioral CM for patient's commitment had a conference call with patient's sister Freida. The purpose of the call was to explain that in order for John to be placed in a facility he needs to have a guardian.  Freida is in agreement patient needs a guardian. In speaking with her, we discovered she was misinformed about the role of the guardian.  We explained the role is to make medical decisions and decisions regarding where patient lives.  We explained Mr Chapin has submitted for patient to have a representative payee so she will not need to handle patient's finances.   Gennar also explained in order to file for guardianship there needs to be a petitioner, writer briefly described what is involved in the petition process.  Explained gennar is familiar with an  who will handle the paper work and accept the fee provided under patient's MA plan.  She stated she is willing to become her brother's guardian and also serve as the petitioner for the guardian.  Writer asked her to call Temo Blair,  to further discuss her responsibilities and then to call writer back to confirm she will proceed with petitioning for her brother to have a guardian.      contacted Mr Blair to expect a call from Freida today or tomorrow morning.      Lulu Contreras, ASIMSW

## 2021-02-09 LAB — LACTATE BLD-SCNC: 1 MMOL/L (ref 0.7–2)

## 2021-02-09 PROCEDURE — 99231 SBSQ HOSP IP/OBS SF/LOW 25: CPT | Performed by: HOSPITALIST

## 2021-02-09 PROCEDURE — 250N000013 HC RX MED GY IP 250 OP 250 PS 637: Performed by: INTERNAL MEDICINE

## 2021-02-09 PROCEDURE — 250N000013 HC RX MED GY IP 250 OP 250 PS 637: Performed by: PSYCHIATRY & NEUROLOGY

## 2021-02-09 PROCEDURE — 120N000001 HC R&B MED SURG/OB

## 2021-02-09 PROCEDURE — 250N000013 HC RX MED GY IP 250 OP 250 PS 637: Performed by: HOSPITALIST

## 2021-02-09 PROCEDURE — 83605 ASSAY OF LACTIC ACID: CPT | Performed by: HOSPITALIST

## 2021-02-09 PROCEDURE — 36415 COLL VENOUS BLD VENIPUNCTURE: CPT | Performed by: HOSPITALIST

## 2021-02-09 PROCEDURE — G0463 HOSPITAL OUTPT CLINIC VISIT: HCPCS

## 2021-02-09 RX ADMIN — HALOPERIDOL 10 MG: 5 TABLET ORAL at 15:32

## 2021-02-09 RX ADMIN — VENLAFAXINE HYDROCHLORIDE 75 MG: 75 CAPSULE, EXTENDED RELEASE ORAL at 08:04

## 2021-02-09 RX ADMIN — DOCUSATE SODIUM 100 MG: 100 CAPSULE, LIQUID FILLED ORAL at 08:04

## 2021-02-09 RX ADMIN — ATORVASTATIN CALCIUM 80 MG: 40 TABLET, FILM COATED ORAL at 21:24

## 2021-02-09 RX ADMIN — SALMETEROL XINAFOATE 1 PUFF: 50 POWDER, METERED ORAL; RESPIRATORY (INHALATION) at 08:14

## 2021-02-09 RX ADMIN — Medication 0.25 MG: at 08:04

## 2021-02-09 RX ADMIN — NICOTINE 1 PATCH: 7 PATCH, EXTENDED RELEASE TRANSDERMAL at 08:04

## 2021-02-09 RX ADMIN — Medication 0.25 MG: at 21:25

## 2021-02-09 RX ADMIN — HALOPERIDOL 10 MG: 5 TABLET ORAL at 08:04

## 2021-02-09 RX ADMIN — DOCUSATE SODIUM 100 MG: 100 CAPSULE, LIQUID FILLED ORAL at 21:24

## 2021-02-09 RX ADMIN — LEVOTHYROXINE SODIUM 88 MCG: 88 TABLET ORAL at 08:04

## 2021-02-09 RX ADMIN — OLANZAPINE 10 MG: 5 TABLET, ORALLY DISINTEGRATING ORAL at 15:55

## 2021-02-09 RX ADMIN — SALMETEROL XINAFOATE 1 PUFF: 50 POWDER, METERED ORAL; RESPIRATORY (INHALATION) at 21:25

## 2021-02-09 RX ADMIN — ASPIRIN 81 MG: 81 TABLET, COATED ORAL at 08:04

## 2021-02-09 RX ADMIN — HALOPERIDOL 10 MG: 5 TABLET ORAL at 21:24

## 2021-02-09 RX ADMIN — MEMANTINE 5 MG: 5 TABLET ORAL at 08:04

## 2021-02-09 ASSESSMENT — ACTIVITIES OF DAILY LIVING (ADL)
ADLS_ACUITY_SCORE: 11

## 2021-02-09 NOTE — PROGRESS NOTES
[facility] WOC Nurse Inpatient Wound Assessment    Assessment of wound(s) on pt's:  Back wound     WOC NURSE ASSESSMENT     Mid-back wound: appears to be a furuncle with no drainage today Wound bed filled to skin level with pinker tissue, with dried thin scab.    No purulent drainage, resolving erythema  Area is nearly completely healed. , continue current POC. Probably healed prior to discharge     WOC NURSE TREATMENT PLAN    Wound care back: Dressing change daily  1. Clean wound with MicroKlenz  2. Cover wound bed Mepilex border  3. Pt may shower and then complete wound care  4 WOC Nurse follow-up plan: weekly and prn   OBJECTIVE DATA    Patient history according to provider notes:  John Juárez is a 55 year old male with PMHx of schizophrenia, hx of TBI, alcohol use disorder, COPD, hyperlipidemia and hypothyroidism who was admitted on 9/9/2020 for evaluation of aggressive behavior at his group home.  He has been evaluated by Psychiatry and his medical condition remains stable, though his group home is now unwilling to take him back and thus hospitalization has been prolonged awaiting new placement.      Neurocognitive disorder dt hx of TBI and alcohol use disorder  Schizophrenia  Under commitment  Had been residing in group home prior to admission.  Brought to hospital for evaluation of aggressive behaviors. Group home now unwilling to take him back. Has had numerous CODE 21s called this stay. Seen by psych, meds adjusted. Is currently under civil commitment and court hold through [facility] until 7/31/21. Earlier on in stay, psych had recommended geripsych placement but per evaluation on 12/18, no longer felt this was needed and recommended to continue current meds. QTc 428 on EKG on 12/18. Seen again by psych on 1/5/21 and again not felt to need inpatient psych stay.   - On Haldol 10mg TID, memantine 5 mg daily, venlafaxine 75 mg daily, benztropine 0.25 mg BID  - Haloperidol, olanzapine, and lorazepam PRN  for acute agitation  - Door alarm in place and necessary as patient has attempted elopement as recent as 1/16  - SW following for placement     Cellulitis - NEW  Assessment: midline back shows wound with mild drainage and surrounding erythema  Plan:  - Keflex QID started  - WO     Baseline Hypotension  Systolic BP generally around 90s-100s . Asymptomatic. No concerns.     Dyslipidemia  LDL 43, HDL 32 Jan 2020.   - continue atorvastatin     COPD  Chronic and stable on RA.   - continue salmeterol, albuterol prn  Rinku Risk Assessment  Sensory Perception: 4-->no impairment    Moisture: 4-->rarely moist   Activity: 3-->walks occasionally     Mobility: 4-->no limitation   Nutrition: 3-->adequate   Friction and Shear: 3-->no apparent problem      Positioning: Rt/Lt positioning only    Mattress:  Atmos air    Current diet  Orders Placed This Encounter      Diet      Combination Diet Regular Diet Adult; Safe Tray - with utensils      Moisture Management:  BRP     Labs:   Recent Labs   Lab Test 01/24/21  0834 12/03/20  0833 12/03/20  0833 06/02/14 2018 06/02/14 2018   ALBUMIN  --   --  3.7   < > 4.0   HGB 14.3   < > 16.4   < > 14.2   INR  --   --   --   --  0.99   WBC 6.2   < > 5.4   < > 6.5    < > = values in this interval not displayed.         [facility] NURSE PHYSICAL EXAM  Wound History:   First noted on 1-21-21 by Nursing staff  Date of last  WO photo:1-29-21                      [facility] Photo: 2-9-21              Size:  .3cm x 3cm small scab, no depth  Undermining: none today   Odor: none  Hilary-wound skin: fading erythema  Pain:  Denies    [facility] NURSE INTERVENTIONS    Assessed   Wound Care:  Dressing changed earlier by Nursing. Re-applied current dressing  Order updated.  Supplies: In floor supply room       Paola Fernandez RN WOCN

## 2021-02-09 NOTE — PLAN OF CARE
Patient A&Ox4. VSS. Denies pain. Up independently. Calm and cooperative through most of the day, continued thinking pt needing to leave, gave Zyprexa for restlessness. Dressing change completed. Door alarm active.

## 2021-02-09 NOTE — PLAN OF CARE
Pt alert to self only. Beginning of writers shift, pt restless/agitated. Pt repeatedly saying they are going to leave, they have someone coming to pick them up. Writer reoriented pt, but pt still remained agitated (slamming the door/swearing at staff)- gave prn ativan PO x1, slightly effective. As shift progressed, pt still confused but more cooperative- took scheduled medication without any issues. Dressing c/d/I to upper back. Independent in room. Door alarm active.

## 2021-02-09 NOTE — PROGRESS NOTES
[facility]    Medicine Progress Note - Hospitalist Service       Date of Admission:  9/9/2020  Assessment & Plan             John Juárez is a 55 year old male with PMHx of schizophrenia, hx of TBI, alcohol use disorder, COPD, hyperlipidemia and hypothyroidism who was admitted on 9/9/2020 for evaluation of aggressive behavior at his group home.  He has been evaluated by Psychiatry and his medical condition remains stable, though his group home is now unwilling to take him back and thus hospitalization has been prolonged awaiting new placement.      Neurocognitive disorder dt hx of TBI and alcohol use disorder  Schizophrenia  Under commitment  Had been residing in group home prior to admission.  Brought to hospital for evaluation of aggressive behaviors. Group home now unwilling to take him back. Has had numerous CODE 21s called this stay. Seen by psych, meds adjusted. Is currently under civil commitment and court hold through [facility] until 7/31/21. Earlier on in stay, psych had recommended geripsych placement but per evaluation on 12/18, no longer felt this was needed and recommended to continue current meds. QTc 428 on EKG on 12/18. Seen again by psych on 1/5/21 and again not felt to need inpatient psych stay.   -- On Haldol 10mg TID, memantine 5 mg daily, venlafaxine 75 mg daily, benztropine 0.25 mg BID  -- Haloperidol, olanzapine, and lorazepam PRN for acute agitation  -- Door alarm in place due to high risk for elopement  -- SW following for placement  - Updated labs 2/6/21 CBC, BMP WNL  - Nursing reports no acute behavioral issues.  - Continue reassurance/redirection as necessary.      Back cellulitis, Improving  Noted on 1/24, initiated on cephalexin at that time  - completed 7-day course of cephalexin on 1/30  - Wound cares in place per WOCN.  Nursing notes no acute wound/skin issues.     Baseline Hypotension  BP 90s systolic on average. Asymptomatic.       Hyperlipidemia  Chronic and stable on atorvastatin     COPD  Chronic and stable on salmeterol, albuterol prn.  No pulmonary issues.     Hypothyroidism  Chronic and stable on levothyroxine     Tobacco use disorder  Using nicotine patch and PRN gum     Resting tremor of upper extremities  No acute issues      Asymptomatic COVID19 screening for discharge planning: negative PCR on 12/6/2020        Diet: Room Service  Diet  Combination Diet Regular Diet Adult; Safe Tray - with utensils    DVT Prophylaxis: Pneumatic Compression Devices  Valentin Catheter: not present  Code Status: Full Code           Disposition Plan   Expected discharge: pending placement - greatly appreciate SW ongoing efforts  Entered: Sanjay Cantu MD 02/09/2021, 3:16 PM       The patient's care was discussed with the Bedside Nurse, Care Coordinator/ and Patient.    Sanjay Cantu MD  Hospitalist Service  [facility]  Contact information available via [facility] Paging/Directory    ______________________________________________________________________    Interval History   Patient seen and examined.  Per chart review appears to have been agitated last night and received Ativan.  Calm and cooperative today.  Watching TV denies complaints-no fevers, chills, shortness of breath or pain.    Data reviewed today: I reviewed all medications, new labs and imaging results over the last 24 hours. I personally reviewed no images or EKG's today.    Physical Exam   Vital Signs: Temp: 96.3  F (35.7  C) Temp src: Oral BP: 90/58 Pulse: 96   Resp: 16 SpO2: 95 % O2 Device: None (Room air)    Weight: 182 lbs 0 oz    Gen: NAD, calm cooperative  HEENT: Normocephalic, EOMI, MMM  Resp: no crackles,  no wheezes, no increased work of resp  CV: S1S2 heard, reg rhythm, reg rate, no pitting pedal edema  Abdo: soft, nontender, nondistended, bowel sounds present  Ext: calves nontender, well perfused  Neuro: Awake and alert, moving all  extremities, no new focal deficits appreciated      Data   Recent Labs   Lab 02/06/21  0833   WBC 5.9   HGB 15.9   MCV 95         POTASSIUM 4.1   CHLORIDE 110*   CO2 24   BUN 20   CR 0.86   ANIONGAP 7   LESLEE 8.6   GLC 77     No results found for this or any previous visit (from the past 24 hour(s)).

## 2021-02-09 NOTE — PLAN OF CARE
Patient alert to self only. Disoriented to place/time/situation, confusion fluctuates. PRN Zyprexa x1 as patient was increasingly agitated this afternoon - asking bedside RN to call the police to get him out of here - Zyprexa effective. BP soft today, encouraged increased water intake. MD notified, orders to continue to monitor. Lactic fired - 1.0. Up independently in room. Back wound care dressing done. Shower completed. Regular diet - needs assistance ordering.

## 2021-02-10 PROCEDURE — 250N000013 HC RX MED GY IP 250 OP 250 PS 637: Performed by: PSYCHIATRY & NEUROLOGY

## 2021-02-10 PROCEDURE — 250N000013 HC RX MED GY IP 250 OP 250 PS 637: Performed by: INTERNAL MEDICINE

## 2021-02-10 PROCEDURE — 120N000001 HC R&B MED SURG/OB

## 2021-02-10 PROCEDURE — 99231 SBSQ HOSP IP/OBS SF/LOW 25: CPT | Performed by: HOSPITALIST

## 2021-02-10 PROCEDURE — 250N000013 HC RX MED GY IP 250 OP 250 PS 637: Performed by: HOSPITALIST

## 2021-02-10 RX ADMIN — ATORVASTATIN CALCIUM 80 MG: 40 TABLET, FILM COATED ORAL at 22:08

## 2021-02-10 RX ADMIN — DOCUSATE SODIUM 100 MG: 100 CAPSULE, LIQUID FILLED ORAL at 22:08

## 2021-02-10 RX ADMIN — HALOPERIDOL 10 MG: 5 TABLET ORAL at 08:48

## 2021-02-10 RX ADMIN — DOCUSATE SODIUM 100 MG: 100 CAPSULE, LIQUID FILLED ORAL at 08:49

## 2021-02-10 RX ADMIN — SALMETEROL XINAFOATE 1 PUFF: 50 POWDER, METERED ORAL; RESPIRATORY (INHALATION) at 22:08

## 2021-02-10 RX ADMIN — MEMANTINE 5 MG: 5 TABLET ORAL at 08:49

## 2021-02-10 RX ADMIN — HALOPERIDOL 10 MG: 5 TABLET ORAL at 16:08

## 2021-02-10 RX ADMIN — VENLAFAXINE HYDROCHLORIDE 75 MG: 75 CAPSULE, EXTENDED RELEASE ORAL at 08:48

## 2021-02-10 RX ADMIN — SALMETEROL XINAFOATE 1 PUFF: 50 POWDER, METERED ORAL; RESPIRATORY (INHALATION) at 08:49

## 2021-02-10 RX ADMIN — OLANZAPINE 10 MG: 5 TABLET, ORALLY DISINTEGRATING ORAL at 13:31

## 2021-02-10 RX ADMIN — LEVOTHYROXINE SODIUM 88 MCG: 88 TABLET ORAL at 08:48

## 2021-02-10 RX ADMIN — HALOPERIDOL 10 MG: 5 TABLET ORAL at 22:08

## 2021-02-10 RX ADMIN — NICOTINE 1 PATCH: 7 PATCH, EXTENDED RELEASE TRANSDERMAL at 08:50

## 2021-02-10 RX ADMIN — Medication 0.25 MG: at 08:48

## 2021-02-10 RX ADMIN — ASPIRIN 81 MG: 81 TABLET, COATED ORAL at 08:48

## 2021-02-10 RX ADMIN — Medication 0.25 MG: at 22:08

## 2021-02-10 ASSESSMENT — ACTIVITIES OF DAILY LIVING (ADL)
ADLS_ACUITY_SCORE: 11

## 2021-02-10 NOTE — PROGRESS NOTES
[facility]    Medicine Progress Note - Hospitalist Service       Date of Admission:  9/9/2020  Assessment & Plan       John Juárez is a 55 year old male with PMHx of schizophrenia, hx of TBI, alcohol use disorder, COPD, hyperlipidemia and hypothyroidism who was admitted on 9/9/2020 for evaluation of aggressive behavior at his group home.  He has been evaluated by Psychiatry and his medical condition remains stable, though his group home is now unwilling to take him back and thus hospitalization has been prolonged awaiting new placement.      Neurocognitive disorder dt hx of TBI and alcohol use disorder  Schizophrenia  Under commitment  Had been residing in group home prior to admission.  Brought to hospital for evaluation of aggressive behaviors. Group home now unwilling to take him back. Has had numerous CODE 21s called this stay. Seen by psych, meds adjusted. Is currently under civil commitment and court hold through [facility] until 7/31/21. Earlier on in stay, psych had recommended geripsych placement but per evaluation on 12/18, no longer felt this was needed and recommended to continue current meds. QTc 428 on EKG on 12/18. Seen again by psych on 1/5/21 and again not felt to need inpatient psych stay.   -- On Haldol 10mg TID, memantine 5 mg daily, venlafaxine 75 mg daily, benztropine 0.25 mg BID  -- Haloperidol, olanzapine, and lorazepam PRN for acute agitation  -- Door alarm in place due to high risk for elopement  -- SW following for placement  - Updated labs 2/6/21 CBC, BMP WNL  - Nursing reports no acute behavioral issues.  - Continue reassurance/redirection as necessary.   -- was noted to have brief orthostatic hypotension after receiving olanzapine on 2/9 - patient without IV access for fairly obvious reasons - encouraged PO intake and close monitoring. If recurrent or can be clearly associated with certain medication, will re-evaluate accordingly.      Back  cellulitis, Improving  Noted on 1/24, initiated on cephalexin at that time  - completed 7-day course of cephalexin on 1/30  - Wound cares in place per WOCN.  Nursing notes no acute wound/skin issues.     Baseline Hypotension  BP 90s systolic on average. Asymptomatic.      Hyperlipidemia  Chronic and stable on atorvastatin     COPD  Chronic and stable on salmeterol, albuterol prn.  No pulmonary issues.     Hypothyroidism  Chronic and stable on levothyroxine     Tobacco use disorder  Using nicotine patch and PRN gum     Resting tremor of upper extremities  No acute issues      Asymptomatic COVID19 screening for discharge planning: negative PCR on 12/6/2020        Diet: Room Service  Diet  Combination Diet Regular Diet Adult; Safe Tray - with utensils    DVT Prophylaxis: Pneumatic Compression Devices and Ambulate every shift  Valentin Catheter: not present  Code Status: Full Code           Disposition Plan   Expected discharge: pending placement  Entered: Sanjay Cantu MD 02/10/2021, 2:45 PM       The patient's care was discussed with the Bedside Nurse and Patient.    Sanjay Cantu MD  Hospitalist Service  [facility]  Contact information available via [facility] Paging/Directory    ______________________________________________________________________    Interval History   Patient seen and examined.  No acute events overnight.  Patient denies shortness of breath or new pain.  He does request a cigarette and wants to get out of here.  I explained to him we are still working on that.    Data reviewed today: I reviewed all medications, new labs and imaging results over the last 24 hours. I personally reviewed no images or EKG's today.    Physical Exam   Vital Signs: Temp: 95  F (35  C) Temp src: Oral BP: 108/81 Pulse: 73   Resp: 16 SpO2: 98 % O2 Device: None (Room air)    Weight: 182 lbs 0 oz    Gen: NAD, calm and cooperative, flat affect  HEENT: Normocephalic, EOMI, MMM  Resp: no crackles,  no  wheezes, no increased work of resp  CV: S1S2 heard, reg rhythm, reg rate, no pedal edema  Abdo: soft, nontender, nondistended, bowel sounds present  Ext: calves nontender, well perfused  Neuro: CN grossly intact, moving all extremities, ambulating the floor without issue      Data   Recent Labs   Lab 02/06/21  0833   WBC 5.9   HGB 15.9   MCV 95         POTASSIUM 4.1   CHLORIDE 110*   CO2 24   BUN 20   CR 0.86   ANIONGAP 7   LESLEE 8.6   GLC 77     No results found for this or any previous visit (from the past 24 hour(s)).

## 2021-02-10 NOTE — PLAN OF CARE
Uneventful shift. Patient alert to self only. Wound dressing on back C/D/I. Cooperative when taking scheduled medications.  No behaviors overnight. Slept well.

## 2021-02-10 NOTE — PLAN OF CARE
"Uneventful shift. Patient alert, disoriented to place/time/situation. PRN Zyprexa x1 - patient wanting to go down stairs to \"catch his ride\". Up independently in room. Door alarm in place as patient is flight risk. Regular diet - need assistance with meal ordering. .   "

## 2021-02-11 PROCEDURE — 250N000013 HC RX MED GY IP 250 OP 250 PS 637: Performed by: HOSPITALIST

## 2021-02-11 PROCEDURE — 250N000013 HC RX MED GY IP 250 OP 250 PS 637: Performed by: PSYCHIATRY & NEUROLOGY

## 2021-02-11 PROCEDURE — 250N000013 HC RX MED GY IP 250 OP 250 PS 637: Performed by: INTERNAL MEDICINE

## 2021-02-11 PROCEDURE — 120N000001 HC R&B MED SURG/OB

## 2021-02-11 PROCEDURE — 99231 SBSQ HOSP IP/OBS SF/LOW 25: CPT | Performed by: HOSPITALIST

## 2021-02-11 RX ADMIN — LEVOTHYROXINE SODIUM 88 MCG: 88 TABLET ORAL at 08:50

## 2021-02-11 RX ADMIN — HALOPERIDOL 10 MG: 5 TABLET ORAL at 21:08

## 2021-02-11 RX ADMIN — ATORVASTATIN CALCIUM 80 MG: 40 TABLET, FILM COATED ORAL at 21:08

## 2021-02-11 RX ADMIN — OLANZAPINE 10 MG: 5 TABLET, ORALLY DISINTEGRATING ORAL at 15:45

## 2021-02-11 RX ADMIN — DOCUSATE SODIUM 100 MG: 100 CAPSULE, LIQUID FILLED ORAL at 08:50

## 2021-02-11 RX ADMIN — SALMETEROL XINAFOATE 1 PUFF: 50 POWDER, METERED ORAL; RESPIRATORY (INHALATION) at 08:51

## 2021-02-11 RX ADMIN — ASPIRIN 81 MG: 81 TABLET, COATED ORAL at 08:50

## 2021-02-11 RX ADMIN — DOCUSATE SODIUM 100 MG: 100 CAPSULE, LIQUID FILLED ORAL at 21:08

## 2021-02-11 RX ADMIN — Medication 0.25 MG: at 21:08

## 2021-02-11 RX ADMIN — Medication 0.25 MG: at 08:50

## 2021-02-11 RX ADMIN — SALMETEROL XINAFOATE 1 PUFF: 50 POWDER, METERED ORAL; RESPIRATORY (INHALATION) at 21:08

## 2021-02-11 RX ADMIN — HALOPERIDOL 10 MG: 5 TABLET ORAL at 08:51

## 2021-02-11 RX ADMIN — VENLAFAXINE HYDROCHLORIDE 75 MG: 75 CAPSULE, EXTENDED RELEASE ORAL at 08:51

## 2021-02-11 RX ADMIN — HALOPERIDOL 10 MG: 5 TABLET ORAL at 15:45

## 2021-02-11 RX ADMIN — MEMANTINE 5 MG: 5 TABLET ORAL at 08:50

## 2021-02-11 ASSESSMENT — ACTIVITIES OF DAILY LIVING (ADL)
ADLS_ACUITY_SCORE: 11

## 2021-02-11 NOTE — PROGRESS NOTES
[facility]    Medicine Progress Note - Hospitalist Service       Date of Admission:  9/9/2020  Assessment & Plan       John Juárez is a 55 year old male with PMHx of schizophrenia, hx of TBI, alcohol use disorder, COPD, hyperlipidemia and hypothyroidism who was admitted on 9/9/2020 for evaluation of aggressive behavior at his group home.  He has been evaluated by Psychiatry and his medical condition remains stable, though his group home is now unwilling to take him back and thus hospitalization has been prolonged awaiting new placement.      Neurocognitive disorder dt hx of TBI and alcohol use disorder  Schizophrenia  Under commitment  Had been residing in group home prior to admission.  Brought to hospital for evaluation of aggressive behaviors. Group home now unwilling to take him back. Has had numerous CODE 21s called this stay. Seen by psych, meds adjusted. Is currently under civil commitment and court hold through [facility] until 7/31/21. Earlier on in stay, psych had recommended geripsych placement but per evaluation on 12/18, no longer felt this was needed and recommended to continue current meds. QTc 428 on EKG on 12/18. Seen again by psych on 1/5/21 and again not felt to need inpatient psych stay.   -- On Haldol 10mg TID, memantine 5 mg daily, venlafaxine 75 mg daily, benztropine 0.25 mg BID  -- Haloperidol, olanzapine, and lorazepam PRN for acute agitation  -- Door alarm in place due to high risk for elopement  -- SW following for placement  - Updated labs 2/6/21 CBC, BMP WNL  - Nursing reports no acute behavioral issues.  - Continue reassurance/redirection as necessary.   -- was noted to have brief orthostatic hypotension after receiving olanzapine on 2/9 - patient without IV access for fairly obvious reasons - encouraged PO intake and close monitoring. If recurrent or can be clearly associated with certain medication, will re-evaluate accordingly.   - 2/11 - stable,  no new issues     Back cellulitis, Improving  Noted on 1/24, initiated on cephalexin at that time  - completed 7-day course of cephalexin on 1/30  - Wound cares in place per WOCN.  Nursing notes no acute wound/skin issues.     Baseline Hypotension  BP 90s systolic on average. Asymptomatic.      Hyperlipidemia  Chronic and stable on atorvastatin     COPD  Chronic and stable on salmeterol, albuterol prn.  No pulmonary issues.     Hypothyroidism  Chronic and stable on levothyroxine     Tobacco use disorder  Using nicotine patch and PRN gum     Resting tremor of upper extremities  No acute issues      Asymptomatic COVID19 screening for discharge planning: negative PCR on 12/6/2020        Diet: Room Service  Diet  Combination Diet Regular Diet Adult; Safe Tray - with utensils    DVT Prophylaxis: Pneumatic Compression Devices and Ambulate every shift  Valentin Catheter: not present  Code Status: Full Code           Disposition Plan   Expected discharge: pending placement - guardianship awaited  Entered: Sanjay Cantu MD 02/11/2021, 7:35 AM       The patient's care was discussed with the Bedside Nurse and Patient.    Sanjay Cantu MD  Hospitalist Service  [facility]  Contact information available via [facility] Paging/Directory    ______________________________________________________________________    Interval History   Seen and examined. No new issues or events. Denies pain or sob.    Data reviewed today: I reviewed all medications, new labs and imaging results over the last 24 hours. I personally reviewed no images or EKG's today.    Physical Exam   Vital Signs: Temp: 96.3  F (35.7  C) Temp src: Oral BP: 125/85 Pulse: 64   Resp: 16 SpO2: 96 % O2 Device: None (Room air)    Weight: 182 lbs 0 oz    Gen: NAD, pleasant, flat affect  HEENT: Normocephalic, EOMI, MMM  Resp: no crackles,  no wheezes, no increased work of resp  CV: S1S2 heard, reg rhythm, reg rate, no pedal edema  Abdo: soft, nontender,  nondistended, bowel sounds present  Ext: calves nontender, well perfused  Neuro: AAOx self, moving all extremities, no new focal deficits appreciated      Data   NNL

## 2021-02-11 NOTE — PLAN OF CARE
No acute events overnight, pt slept. VS deferred. Pt confused but redirectable. Nicotine patch on R shoulder. Awaiting guardianship process, discharge TBD.

## 2021-02-12 PROCEDURE — 120N000001 HC R&B MED SURG/OB

## 2021-02-12 PROCEDURE — 250N000013 HC RX MED GY IP 250 OP 250 PS 637: Performed by: PSYCHIATRY & NEUROLOGY

## 2021-02-12 PROCEDURE — 250N000013 HC RX MED GY IP 250 OP 250 PS 637: Performed by: HOSPITALIST

## 2021-02-12 PROCEDURE — 250N000013 HC RX MED GY IP 250 OP 250 PS 637: Performed by: INTERNAL MEDICINE

## 2021-02-12 PROCEDURE — 99231 SBSQ HOSP IP/OBS SF/LOW 25: CPT | Performed by: HOSPITALIST

## 2021-02-12 RX ADMIN — Medication 0.25 MG: at 21:36

## 2021-02-12 RX ADMIN — ASPIRIN 81 MG: 81 TABLET, COATED ORAL at 09:03

## 2021-02-12 RX ADMIN — DOCUSATE SODIUM 100 MG: 100 CAPSULE, LIQUID FILLED ORAL at 09:02

## 2021-02-12 RX ADMIN — LEVOTHYROXINE SODIUM 88 MCG: 88 TABLET ORAL at 09:02

## 2021-02-12 RX ADMIN — HALOPERIDOL 10 MG: 5 TABLET ORAL at 21:35

## 2021-02-12 RX ADMIN — SALMETEROL XINAFOATE 1 PUFF: 50 POWDER, METERED ORAL; RESPIRATORY (INHALATION) at 21:36

## 2021-02-12 RX ADMIN — HALOPERIDOL 10 MG: 5 TABLET ORAL at 15:29

## 2021-02-12 RX ADMIN — DOCUSATE SODIUM 100 MG: 100 CAPSULE, LIQUID FILLED ORAL at 21:36

## 2021-02-12 RX ADMIN — HALOPERIDOL 10 MG: 5 TABLET ORAL at 09:03

## 2021-02-12 RX ADMIN — ATORVASTATIN CALCIUM 80 MG: 40 TABLET, FILM COATED ORAL at 21:35

## 2021-02-12 RX ADMIN — MEMANTINE 5 MG: 5 TABLET ORAL at 09:03

## 2021-02-12 RX ADMIN — VENLAFAXINE HYDROCHLORIDE 75 MG: 75 CAPSULE, EXTENDED RELEASE ORAL at 09:03

## 2021-02-12 RX ADMIN — Medication 0.25 MG: at 09:02

## 2021-02-12 RX ADMIN — NICOTINE 1 PATCH: 7 PATCH, EXTENDED RELEASE TRANSDERMAL at 09:04

## 2021-02-12 RX ADMIN — SALMETEROL XINAFOATE 1 PUFF: 50 POWDER, METERED ORAL; RESPIRATORY (INHALATION) at 09:03

## 2021-02-12 ASSESSMENT — ACTIVITIES OF DAILY LIVING (ADL)
ADLS_ACUITY_SCORE: 11

## 2021-02-12 NOTE — PLAN OF CARE
No change. Cooperative with partial assessment then declined further. Good appetite, up ad holley in room, door alarm on. Waiting for guardianship process.

## 2021-02-12 NOTE — PLAN OF CARE
A/O to self. VSS on RA. Denies pain. Calm and cooperative this shift. Tolerating regular diet. IND in room. Door alarm. Discharge pending guardianship.

## 2021-02-12 NOTE — PLAN OF CARE
"  Shift report:VSS, quiet, cooperative most of day, got squirrely in afternoon, took all his meds and I gave him some zyprexa. Dressing changed, packing was creating pressure, mepillex was also pressing into his skin. I called WOC, Yola was going to change orders to just clean with cleanser and  \"protect\", even just barrier wipe or bandaid. Currently protected with skin prep wipe.  "

## 2021-02-12 NOTE — PLAN OF CARE
No change in pt condition. Door alarm still on for elopement measures. Pt calm and cooperative. Placement pending. SW following.

## 2021-02-12 NOTE — PROGRESS NOTES
[facility]    Medicine Progress Note - Hospitalist Service       Date of Admission:  9/9/2020  Assessment & Plan             John Juárez is a 55 year old male with PMHx of schizophrenia, hx of TBI, alcohol use disorder, COPD, hyperlipidemia and hypothyroidism who was admitted on 9/9/2020 for evaluation of aggressive behavior at his group home.  He has been evaluated by Psychiatry and his medical condition remains stable, though his group home is now unwilling to take him back and thus hospitalization has been prolonged awaiting new placement.      Neurocognitive disorder dt hx of TBI and alcohol use disorder  Schizophrenia  Under commitment  Had been residing in group home prior to admission.  Brought to hospital for evaluation of aggressive behaviors. Group home now unwilling to take him back. Has had numerous CODE 21s called this stay. Seen by psych, meds adjusted. Is currently under civil commitment and court hold through [facility] until 7/31/21. Earlier on in stay, psych had recommended geripsych placement but per evaluation on 12/18, no longer felt this was needed and recommended to continue current meds. QTc 428 on EKG on 12/18. Seen again by psych on 1/5/21 and again not felt to need inpatient psych stay.   -- On Haldol 10mg TID, memantine 5 mg daily, venlafaxine 75 mg daily, benztropine 0.25 mg BID  -- Haloperidol, olanzapine, and lorazepam PRN for acute agitation  -- Door alarm in place due to high risk for elopement  -- SW following for placement  - Updated labs 2/6/21 CBC, BMP WNL  - Nursing reports no acute behavioral issues.  - Continue reassurance/redirection as necessary.   -- was noted to have brief orthostatic hypotension after receiving olanzapine on 2/9 - patient without IV access for fairly obvious reasons - encouraged PO intake and close monitoring. If recurrent or can be clearly associated with certain medication, will re-evaluate accordingly.   - 2/11 and  2/12 - stable, no new issues     Back cellulitis, Improving  Noted on 1/24, initiated on cephalexin at that time  - completed 7-day course of cephalexin on 1/30  - Wound cares in place per WOCN.  Nursing notes no acute wound/skin issues.     Baseline Hypotension  BP 90s systolic on average. Asymptomatic.      Hyperlipidemia  Chronic and stable on atorvastatin     COPD  Chronic and stable on salmeterol, albuterol prn.  No pulmonary issues.     Hypothyroidism  Chronic and stable on levothyroxine     Tobacco use disorder  Using nicotine patch and PRN gum     Resting tremor of upper extremities  No acute issues      Asymptomatic COVID19 screening for discharge planning: negative PCR on 12/6/2020      Diet: Room Service  Diet  Combination Diet Regular Diet Adult; Safe Tray - with utensils    DVT Prophylaxis: Pneumatic Compression Devices and Ambulate every shift  Valentin Catheter: not present  Code Status: Full Code           Disposition Plan   Expected discharge: Unclear-pending placement and guardianship process  Entered: Sanjay Cantu MD 02/12/2021, 2:51 PM       The patient's care was discussed with the Bedside Nurse and Patient.    Sanjay Cantu MD  Hospitalist Service  [facility]  Contact information available via [facility] Paging/Directory    ______________________________________________________________________    Interval History   No acute overnight events.  Patient seen and examined.  He is eating lunch and not very talkative with me today.  Denies shortness of breath or chest pain.  No new complaints.    Data reviewed today: I reviewed all medications, new labs and imaging results over the last 24 hours. I personally reviewed no images or EKG's today.    Physical Exam   Vital Signs: Temp: 96.6  F (35.9  C) Temp src: Oral BP: 96/63 Pulse: 68   Resp: 16 SpO2: 97 % O2 Device: None (Room air)    Weight: 182 lbs 0 oz    Gen: NAD, minimally talkative today during lunch, flat  affect  HEENT: Normocephalic, EOMI, MMM  Resp: no crackles,  no wheezes, no increased work of resp  CV: S1S2 heard, reg rhythm, reg rate, no pedal edema  Abdo: soft, nontender, nondistended, bowel sounds present  Ext: calves nontender, well perfused  Neuro: AAOxself, CN grossly intact, no facial asymmetry      Data   NNL

## 2021-02-13 PROCEDURE — 250N000013 HC RX MED GY IP 250 OP 250 PS 637: Performed by: PSYCHIATRY & NEUROLOGY

## 2021-02-13 PROCEDURE — 250N000013 HC RX MED GY IP 250 OP 250 PS 637: Performed by: HOSPITALIST

## 2021-02-13 PROCEDURE — 120N000001 HC R&B MED SURG/OB

## 2021-02-13 PROCEDURE — 250N000013 HC RX MED GY IP 250 OP 250 PS 637: Performed by: INTERNAL MEDICINE

## 2021-02-13 PROCEDURE — 99231 SBSQ HOSP IP/OBS SF/LOW 25: CPT | Performed by: HOSPITALIST

## 2021-02-13 RX ADMIN — LORAZEPAM 1 MG: 1 TABLET ORAL at 19:34

## 2021-02-13 RX ADMIN — DOCUSATE SODIUM 100 MG: 100 CAPSULE, LIQUID FILLED ORAL at 20:39

## 2021-02-13 RX ADMIN — SALMETEROL XINAFOATE 1 PUFF: 50 POWDER, METERED ORAL; RESPIRATORY (INHALATION) at 08:22

## 2021-02-13 RX ADMIN — ASPIRIN 81 MG: 81 TABLET, COATED ORAL at 08:21

## 2021-02-13 RX ADMIN — VENLAFAXINE HYDROCHLORIDE 75 MG: 75 CAPSULE, EXTENDED RELEASE ORAL at 08:22

## 2021-02-13 RX ADMIN — HALOPERIDOL 10 MG: 5 TABLET ORAL at 08:21

## 2021-02-13 RX ADMIN — HALOPERIDOL 10 MG: 5 TABLET ORAL at 20:39

## 2021-02-13 RX ADMIN — HALOPERIDOL 10 MG: 5 TABLET ORAL at 15:03

## 2021-02-13 RX ADMIN — ATORVASTATIN CALCIUM 80 MG: 40 TABLET, FILM COATED ORAL at 20:39

## 2021-02-13 RX ADMIN — SALMETEROL XINAFOATE 1 PUFF: 50 POWDER, METERED ORAL; RESPIRATORY (INHALATION) at 20:39

## 2021-02-13 RX ADMIN — MEMANTINE 5 MG: 5 TABLET ORAL at 08:21

## 2021-02-13 RX ADMIN — MELATONIN TAB 3 MG 3 MG: 3 TAB at 21:59

## 2021-02-13 RX ADMIN — Medication 0.25 MG: at 08:21

## 2021-02-13 RX ADMIN — OLANZAPINE 10 MG: 5 TABLET, ORALLY DISINTEGRATING ORAL at 16:20

## 2021-02-13 RX ADMIN — HALOPERIDOL 5 MG: 5 TABLET ORAL at 17:42

## 2021-02-13 RX ADMIN — Medication 0.25 MG: at 20:39

## 2021-02-13 RX ADMIN — DOCUSATE SODIUM 100 MG: 100 CAPSULE, LIQUID FILLED ORAL at 08:21

## 2021-02-13 RX ADMIN — LEVOTHYROXINE SODIUM 88 MCG: 88 TABLET ORAL at 08:21

## 2021-02-13 RX ADMIN — NICOTINE 1 PATCH: 7 PATCH, EXTENDED RELEASE TRANSDERMAL at 08:20

## 2021-02-13 ASSESSMENT — ACTIVITIES OF DAILY LIVING (ADL)
ADLS_ACUITY_SCORE: 11

## 2021-02-13 NOTE — PROGRESS NOTES
[facility]    Medicine Progress Note - Hospitalist Service       Date of Admission:  9/9/2020  Assessment & Plan       John Juárez is a 55 year old male with PMHx of schizophrenia, hx of TBI, alcohol use disorder, COPD, hyperlipidemia and hypothyroidism who was admitted on 9/9/2020 for evaluation of aggressive behavior at his group home.  He has been evaluated by Psychiatry and his medical condition remains stable, though his group home is now unwilling to take him back and thus hospitalization has been prolonged awaiting new placement.      Neurocognitive disorder dt hx of TBI and alcohol use disorder  Schizophrenia  Under commitment  Had been residing in group home prior to admission.  Brought to hospital for evaluation of aggressive behaviors. Group home now unwilling to take him back. Has had numerous CODE 21s called this stay. Seen by psych, meds adjusted. Is currently under civil commitment and court hold through [facility] until 7/31/21. Earlier on in stay, psych had recommended geripsych placement but per evaluation on 12/18, no longer felt this was needed and recommended to continue current meds. QTc 428 on EKG on 12/18. Seen again by psych on 1/5/21 and again not felt to need inpatient psych stay.   -- On Haldol 10mg TID, memantine 5 mg daily, venlafaxine 75 mg daily, benztropine 0.25 mg BID  -- Haloperidol, olanzapine, and lorazepam PRN for acute agitation  -- Door alarm in place due to high risk for elopement  -- SW following for placement  - Updated labs 2/6/21 CBC, BMP WNL  - Nursing reports no acute behavioral issues.  - Continue reassurance/redirection as necessary.   -- was noted to have brief orthostatic hypotension after receiving olanzapine on 2/9 - patient without IV access for fairly obvious reasons - encouraged PO intake and close monitoring. If recurrent or can be clearly associated with certain medication, will re-evaluate accordingly.   - 2/11 - 2/13 -  stable, no new issues     Back cellulitis, Improving  Noted on 1/24, initiated on cephalexin at that time  - completed 7-day course of cephalexin on 1/30  - Wound cares in place per WOCN.  Nursing notes no acute wound/skin issues.     Baseline Hypotension  BP 90s systolic on average. Asymptomatic.      Hyperlipidemia  Chronic and stable on atorvastatin     COPD  Chronic and stable on salmeterol, albuterol prn.  No pulmonary issues.     Hypothyroidism  Chronic and stable on levothyroxine     Tobacco use disorder  Using nicotine patch and PRN gum     Resting tremor of upper extremities  No acute issues      Asymptomatic COVID19 screening for discharge planning: negative PCR on 12/6/2020      Diet: Room Service  Diet  Combination Diet Regular Diet Adult; Safe Tray - with utensils    DVT Prophylaxis: Pneumatic Compression Devices and Ambulate every shift  Valentin Catheter: not present  Code Status: Full Code           Disposition Plan   Expected discharge: unclear, pending guardianship and placement  Entered: Sanjay Cantu MD 02/13/2021, 4:13 PM       The patient's care was discussed with the Bedside Nurse and Patient.    Sanjay Cantu MD  Hospitalist Service  [facility]  Contact information available via [facility] Paging/Directory    ______________________________________________________________________    Interval History    Patient seen and examined this afternoon.  More conversant today.  Denies shortness of breath or pain.  Currently enjoying Jurassic Park on TV - we discussed how terrifying it would be if dinosaurs existed again.    Data reviewed today: I reviewed all medications, new labs and imaging results over the last 24 hours. I personally reviewed no images or EKG's today.    Physical Exam   Vital Signs: Temp: 95.5  F (35.3  C) Temp src: Oral BP: 101/79 Pulse: 87   Resp: 16 SpO2: 99 % O2 Device: None (Room air)    Weight: 182 lbs 0 oz    Gen: NAD, pleasant  HEENT: Normocephalic,  EOMI, MMM  Resp: no crackles,  no wheezes, no increased work of resp  CV: S1S2 heard, reg rhythm, reg rate, no pedal edema  Abdo: soft, nontender, nondistended, bowel sounds present  Ext: calves nontender, well perfused  Neuro: AAOxself, CN grossly intact, no facial asymmetry, moving all ext grossly normally      Data

## 2021-02-13 NOTE — CODE/RAPID RESPONSE
[facility]    Code 21 Note  2/13/2021   Time Called: 527pm    Code 21 called for: Attempts to abscond    Assessment & Plan   IMPRESSION & PLAN:    John Juárez is a 55 year old male w/ PMH of 0 for a year, TBI, alcohol abuse disorder, neurocognitive disorder secondary to above, hyperlipidemia and hypothyroidism who was admitted on 9/9/2020 for aggressive behavior at his group home and is now awaiting placement at a new facility.  He was felt not to warrant inpatient psychiatric treatment but is on a civil commitment through [facility].      Code 21 was called because [facility] to [facility] by security.  He was given scheduled Haldol at 3PM and a dose of as needed Zyprexa at 4:20 PM 10 mg.  He was not trying to harm anyone just was being aggressive in his behavior, swearing.  During the code 21 he called 911.  He repeatedly slammed the bathroom door during multiple trips to the toilet.  I do note he is on a commitment until July 31, 2021.  Patient is insistent that his commitment is over before that date.  He is demanding to leave but is easily verbally redirected    Impression  Acute behavioral, ongoing during his hospitalization secondary to neurocognitive disorder in setting of TBI and alcohol use disorder and schizophrenia  -Not able to rule out physiologic etiology as patient is refusing exam    INTERVENTIONS:  -Patient is already redirected back to his room by security by the time my arrival  -Patient is willing to take oral medications will give a dose of as needed p.o. Haldol.  -Should he remain in an agitated state approximately 6:30 PM can use as needed dose of p.o. lorazepam.  -Encourage patient to have a meal, dinner tray is in route  -Toileted multiple times as above  -I offered to check vital signs, perform a physical exam and check glucose all which patient refused  -Will give some extra TLC with a manicure as  his nails have some potential to cause harm to self or staff    At the end of the RRT patient had received his prn agents, was ambulating in between the bed and the toilet, denied diarrhea generally cooperative with care and/or redirectable    disposition: Continue current level of care     Discussed with and defer further cares to hospitalist attending physician      Code Status: Full Code    Allergies   Allergies   Allergen Reactions     Ibuprofen      Latex        Physical Exam   Vital Signs with Ranges:  Temp:  [95.5  F (35.3  C)-96.7  F (35.9  C)] 96.7  F (35.9  C)  Pulse:  [78-87] 78  Resp:  [16-17] 16  BP: (101-127)/(79-82) 127/82  SpO2:  [98 %-99 %] 98 %  No intake/output data recorded.    Constitutional:no acute distress, stable gait, patient refused further exam      Data       CBC with Diff:  Recent Labs   Lab Test 02/06/21  0833 06/02/14 2018 06/02/14 2018   WBC 5.9   < > 6.5   HGB 15.9   < > 14.2   MCV 95   < > 100      < > 122*   INR  --   --  0.99    < > = values in this interval not displayed.        Time Spent on this Encounter   I spent 15 minutes(530-545pm) on the unit/floor managing the care of John Juárez. Over 50% of my time was spent counseling the patient and/or coordinating care regarding services listed in this note.    Viviane Donald, [facility] SHREE  [phone]

## 2021-02-14 PROCEDURE — 99232 SBSQ HOSP IP/OBS MODERATE 35: CPT | Performed by: INTERNAL MEDICINE

## 2021-02-14 PROCEDURE — 250N000013 HC RX MED GY IP 250 OP 250 PS 637: Performed by: PSYCHIATRY & NEUROLOGY

## 2021-02-14 PROCEDURE — 250N000013 HC RX MED GY IP 250 OP 250 PS 637: Performed by: HOSPITALIST

## 2021-02-14 PROCEDURE — 250N000013 HC RX MED GY IP 250 OP 250 PS 637: Performed by: INTERNAL MEDICINE

## 2021-02-14 PROCEDURE — 120N000001 HC R&B MED SURG/OB

## 2021-02-14 RX ADMIN — DOCUSATE SODIUM 100 MG: 100 CAPSULE, LIQUID FILLED ORAL at 21:06

## 2021-02-14 RX ADMIN — VENLAFAXINE HYDROCHLORIDE 75 MG: 75 CAPSULE, EXTENDED RELEASE ORAL at 08:44

## 2021-02-14 RX ADMIN — ATORVASTATIN CALCIUM 80 MG: 40 TABLET, FILM COATED ORAL at 21:06

## 2021-02-14 RX ADMIN — Medication 0.25 MG: at 08:44

## 2021-02-14 RX ADMIN — ASPIRIN 81 MG: 81 TABLET, COATED ORAL at 08:44

## 2021-02-14 RX ADMIN — Medication 0.25 MG: at 21:06

## 2021-02-14 RX ADMIN — LEVOTHYROXINE SODIUM 88 MCG: 88 TABLET ORAL at 08:44

## 2021-02-14 RX ADMIN — DOCUSATE SODIUM 100 MG: 100 CAPSULE, LIQUID FILLED ORAL at 08:44

## 2021-02-14 RX ADMIN — HALOPERIDOL 10 MG: 5 TABLET ORAL at 08:44

## 2021-02-14 RX ADMIN — SALMETEROL XINAFOATE 1 PUFF: 50 POWDER, METERED ORAL; RESPIRATORY (INHALATION) at 08:46

## 2021-02-14 RX ADMIN — MEMANTINE 5 MG: 5 TABLET ORAL at 08:44

## 2021-02-14 RX ADMIN — SALMETEROL XINAFOATE 1 PUFF: 50 POWDER, METERED ORAL; RESPIRATORY (INHALATION) at 21:05

## 2021-02-14 RX ADMIN — HALOPERIDOL 10 MG: 5 TABLET ORAL at 21:06

## 2021-02-14 RX ADMIN — HALOPERIDOL 10 MG: 5 TABLET ORAL at 15:53

## 2021-02-14 ASSESSMENT — ACTIVITIES OF DAILY LIVING (ADL)
ADLS_ACUITY_SCORE: 11
ADLS_ACUITY_SCORE: 11
ADLS_ACUITY_SCORE: 13
ADLS_ACUITY_SCORE: 11
ADLS_ACUITY_SCORE: 13
ADLS_ACUITY_SCORE: 11

## 2021-02-14 NOTE — PROGRESS NOTES
[facility]    Medicine Progress Note - Hospitalist Service       Date of Admission:  9/9/2020    Assessment & Plan   John Juárez is a 55 year old male with PMHx of schizophrenia, hx of TBI, alcohol use disorder, COPD, hyperlipidemia and hypothyroidism who was admitted on 9/9/2020 for evaluation of aggressive behavior at his group home.  He has been evaluated by Psychiatry and his medical condition remains stable, though his group home is now unwilling to take him back and thus hospitalization has been prolonged awaiting new placement. Under civil commitment through [facility]     Neurocognitive disorder dt hx of TBI and alcohol use disorder  Schizophrenia  Under commitment  Had been residing in group home prior to admission.  Brought to hospital for evaluation of aggressive behaviors. Group home now unwilling to take him back. Has had numerous CODE 21s called this stay. Seen by psych, meds adjusted. Is currently under civil commitment and court hold through [facility] until 7/31/21. Earlier on in stay, psych had recommended geripsych placement but per evaluation on 12/18, no longer felt this was needed and recommended to continue current meds. QTc 428 on EKG on 12/18. Seen again by psych on 1/5/21 and again not felt to need inpatient psych stay.   -- On Haldol 10mg TID, memantine 5 mg daily, venlafaxine 75 mg daily, benztropine 0.25 mg BID  -- Haloperidol, olanzapine, and lorazepam PRN for acute agitation   *Had isolated episode of orthostatic hypotension after receiving olanzapine, but no recurrence  -- Door alarm in place due to high risk for elopement  -- SW following for placement  -- RN with concerns regarding ongoing agitation and patient/provider safety on the unit, Psych reconsulted     Back cellulitis, Resolved  Noted on 1/24, initiated on cephalexin at that time. Received local wound care and completed 7-day course of antibiotics on 1/30    Baseline Hypotension  BP  90s systolic on average. Asymptomatic. No concerns     Hyperlipidemia  Chronic and stable on atorvastatin     COPD  Chronic and stable on salmeterol, albuterol prn     Hypothyroidism  Chronic and stable on levothyroxine     Tobacco use disorder  Using nicotine patch and PRN gum     Resting tremor of upper extremities  No acute issues      Asymptomatic COVID19 screening: negative on 12/6/2020     Diet: Room Service  Diet  Combination Diet Regular Diet Adult; Safe Tray - with utensils    DVT Prophylaxis: Pneumatic Compression Devices  Valentin Catheter: not present  Code Status: Full Code         Disposition: Expected discharge pending placement. Awaiting guardianship    Elva Solano MD  Hospitalist Service  [facility]  Contact information available via [facility] Paging/Directory    ______________________________________________________________________    Interval History   Code 21 called yesterday evening for attempting to elope, but he was easily redirected. Offers no concerns this morning    Data reviewed today: I reviewed all medications, new labs and imaging results over the last 24 hours. I personally reviewed no images or EKG's today.    Physical Exam   Vital Signs: Temp: 95.7  F (35.4  C) Temp src: Axillary BP: 103/79 Pulse: 62   Resp: 16 SpO2: 97 % O2 Device: None (Room air)    Weight: 182 lbs 0 oz  Constitutional: Resting comfortably, NAD  Respiratory: Breathing non-labored.Lungs CTAB - no wheezes/crackles/rhonchi  Cardiovascular: Heart RRR, no m/r/g. No pedal edema  Abdomen/GI: +BS, abd soft/NT  Skin/Integument: No rash  Neuro: Alert, answers 'yes/no' appropriately, MICHAELS  Psych: Calm and cooperative    Data   No lab results found in last 7 days.      No results found for this or any previous visit (from the past 24 hour(s)).    Medications       aspirin  81 mg Oral Daily     atorvastatin  80 mg Oral At Bedtime     benztropine  0.25 mg Oral BID     docusate sodium  100 mg Oral BID      haloperidol  10 mg Oral TID     levothyroxine  88 mcg Oral Daily     memantine  5 mg Oral Daily     nicotine  1 patch Transdermal Daily     nicotine   Transdermal Q8H     salmeterol  1 puff Inhalation BID     venlafaxine  75 mg Oral Daily with breakfast

## 2021-02-14 NOTE — PLAN OF CARE
Problem: Adult Inpatient Plan of Care  Goal: Optimal Comfort and Wellbeing  Intervention: Provide Person-Centered Care     VSS, Alert, oriented to self and place.  Independent in room. Door alarm in place.  Plan for re-consult from psychiatry to re-evaluate due to escalating behavior (slamming doors, swearing, name calling) and attempts at elopement.  Although at times patient is easily redirectable, at other times pt will require redirection, code 21 and PRN medications frequently throughout the day, sometimes every 5-15 minutes. This shift patient was calm and did sleep most of the shift as compared to yesterday.  Pt is aided by hygiene cares such as shower, lotion applied to hands or back and normal social interaction.  State commitment hold until July, sister is considering guardianship complicating placement options.

## 2021-02-14 NOTE — PLAN OF CARE
Alert to self and place. Behavior labile, frequently agitated during evening and needing redirection. Up independently. Denies pain. Regular diet. Continent. Refused assessment. Mepilex to back CDI. Discharge pending placement.

## 2021-02-14 NOTE — PLAN OF CARE
Problem: Adult Inpatient Plan of Care  Goal: Optimal Comfort and Wellbeing  Outcome: No Change   VSS, alert, oriented to self.  Ambulating independently.  Code 21 today for escalating behavior, swearing [facility]  PRN Haldol given along with hand soak, nail trim and lotion which calmed patient.  No IV access.  Healed wound on back.  Awaiting placement.

## 2021-02-15 PROCEDURE — 250N000013 HC RX MED GY IP 250 OP 250 PS 637: Performed by: INTERNAL MEDICINE

## 2021-02-15 PROCEDURE — 250N000013 HC RX MED GY IP 250 OP 250 PS 637: Performed by: HOSPITALIST

## 2021-02-15 PROCEDURE — 120N000001 HC R&B MED SURG/OB

## 2021-02-15 PROCEDURE — 99232 SBSQ HOSP IP/OBS MODERATE 35: CPT | Performed by: INTERNAL MEDICINE

## 2021-02-15 PROCEDURE — 99232 SBSQ HOSP IP/OBS MODERATE 35: CPT | Performed by: PSYCHIATRY & NEUROLOGY

## 2021-02-15 PROCEDURE — 250N000013 HC RX MED GY IP 250 OP 250 PS 637: Performed by: PSYCHIATRY & NEUROLOGY

## 2021-02-15 PROCEDURE — 99207 PR CDG-CUT & PASTE-POTENTIAL IMPACT ON LEVEL: CPT | Performed by: INTERNAL MEDICINE

## 2021-02-15 RX ADMIN — DOCUSATE SODIUM 100 MG: 100 CAPSULE, LIQUID FILLED ORAL at 20:07

## 2021-02-15 RX ADMIN — VENLAFAXINE HYDROCHLORIDE 75 MG: 75 CAPSULE, EXTENDED RELEASE ORAL at 08:39

## 2021-02-15 RX ADMIN — HALOPERIDOL 10 MG: 5 TABLET ORAL at 08:39

## 2021-02-15 RX ADMIN — Medication 0.25 MG: at 08:39

## 2021-02-15 RX ADMIN — ATORVASTATIN CALCIUM 80 MG: 40 TABLET, FILM COATED ORAL at 20:07

## 2021-02-15 RX ADMIN — MEMANTINE 5 MG: 5 TABLET ORAL at 08:39

## 2021-02-15 RX ADMIN — Medication 0.25 MG: at 20:07

## 2021-02-15 RX ADMIN — OLANZAPINE 10 MG: 5 TABLET, ORALLY DISINTEGRATING ORAL at 20:07

## 2021-02-15 RX ADMIN — LEVOTHYROXINE SODIUM 88 MCG: 88 TABLET ORAL at 08:39

## 2021-02-15 RX ADMIN — HALOPERIDOL 10 MG: 5 TABLET ORAL at 15:24

## 2021-02-15 RX ADMIN — SALMETEROL XINAFOATE 1 PUFF: 50 POWDER, METERED ORAL; RESPIRATORY (INHALATION) at 20:11

## 2021-02-15 RX ADMIN — SALMETEROL XINAFOATE 1 PUFF: 50 POWDER, METERED ORAL; RESPIRATORY (INHALATION) at 08:44

## 2021-02-15 RX ADMIN — HALOPERIDOL 10 MG: 5 TABLET ORAL at 21:16

## 2021-02-15 RX ADMIN — ASPIRIN 81 MG: 81 TABLET, COATED ORAL at 08:39

## 2021-02-15 RX ADMIN — DOCUSATE SODIUM 100 MG: 100 CAPSULE, LIQUID FILLED ORAL at 08:39

## 2021-02-15 ASSESSMENT — ACTIVITIES OF DAILY LIVING (ADL)
ADLS_ACUITY_SCORE: 13
ADLS_ACUITY_SCORE: 11
ADLS_ACUITY_SCORE: 11
ADLS_ACUITY_SCORE: 13
ADLS_ACUITY_SCORE: 11
ADLS_ACUITY_SCORE: 13

## 2021-02-15 NOTE — CONSULTS
"[facility] Psychiatric Consult Progress Note    Interval History:   I met with the patient on station 88.  Routine follow-up was requested with respect to his psychiatric medications.  He remains confused, told me \"[facility]".  He seems to be quite guarded, speaks in a very soft voice, whispering his responses.  He denies current thoughts of wanting to hurt self or others.  I do note that they have a door alarm in place due to his chronic impulsivity and history of elopement.  He remains on Haldol, Cogentin, and a low-dose of venlafaxine.  He does not have any specific complaints today.     Review of systems:   10 point Review of Systems completed by Sushil Wilks MD , and is  is negative other than noted in the HPI     Medications:       aspirin  81 mg Oral Daily     atorvastatin  80 mg Oral At Bedtime     benztropine  0.25 mg Oral BID     docusate sodium  100 mg Oral BID     haloperidol  10 mg Oral TID     levothyroxine  88 mcg Oral Daily     memantine  5 mg Oral Daily     nicotine  1 patch Transdermal Daily     nicotine   Transdermal Q8H     salmeterol  1 puff Inhalation BID     venlafaxine  75 mg Oral Daily with breakfast     acetaminophen, albuterol, alum & mag hydroxide-simethicone, haloperidol, LORazepam, melatonin, naloxone **OR** naloxone **OR** naloxone **OR** naloxone, nicotine, OLANZapine, OLANZapine zydis, ondansetron **OR** ondansetron, polyethylene glycol    Mental Status Examination:   Appearance:  awake, alert  Eye Contact: Intense, he tends to stare blankly at times  Speech:  Impoverished, monotone, speaks in a whisper  language: minimal  Psychomotor Behavior:  no evidence of tardive dyskinesia, dystonia, or tics  Mood: \"I am fine\"  affect: Blunted   thought Process:  paucity of speech, no obvious loosening of associations flight of ideas or formal thought disorder  Thought Content:  no evidence of suicidal ideation or homicidal " "ideation, but at times he does tend to stare off into space, looks a little bit distracted and guarded.    Oriented to: Person only, he thought he was at [facility] stating \"I am at [facility]\"  Attention Span and Concentration:  poor  Recent and Remote Memory:  limited  Fund of Knowledge: delayed  Insight:  limited  Judgment:  limited       Labs/Vitals:   No results found for this or any previous visit (from the past 24 hour(s)).  B/P: 123/95, T: 97.6, P: 89, R: 18  Impression  John is a 55-year-old gentleman with a known major neurocognitive disorder and unspecified psychotic illness.  Psychiatrically things are really unchanged with him.  It looks to me like they are pursuing guardianship to augment his commitment, placement continues to be a challenge.  It would appear that he needs 24-hour supervision in a locked setting, no clear rationale for psychiatry transfer as he cannot participate in programming and his issues relate primarily to the need for long-term placement.      DIagnoses:   1.  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   2.  Other schizophrenia spectrum disorder.   3.  Alcohol use disorder in remission.   4.  Stimulant use disorder, in remission.   5.  Chronic obstructive pulmonary disease.   6.  Hypertension.          Plan:   1.  Continue present medication   2.  No specific indication for psychiatry transfer at this time   3.  Continue with door alarm, he is under commitment, it looks like they are pursuing guardianship  4. Monitor impulsivity, at some juncture might consider depakote trial if overt aggression becomes an issue, though at present I don't see a clear need to make changes  Attestation:  Patient has been seen and evaluated by me,  Sushil Wilks MD    Visit/Communication Style   In person, face-to-face  "

## 2021-02-15 NOTE — PROGRESS NOTES
[facility]    Medicine Progress Note - Hospitalist Service       Date of Admission:  9/9/2020    Assessment & Plan   John Juárez is a 55 year old male with PMHx of schizophrenia, hx of TBI, alcohol use disorder, COPD, hyperlipidemia and hypothyroidism who was admitted on 9/9/2020 for evaluation of aggressive behavior at his group home.  He has been evaluated by Psychiatry and his medical condition remains stable, though his group home is now unwilling to take him back and thus hospitalization has been prolonged awaiting new placement. Under civil commitment through [facility]     Neurocognitive disorder dt hx of TBI and alcohol use disorder  Schizophrenia  Under commitment  Had been residing in group home prior to admission.  Brought to hospital for evaluation of aggressive behaviors. Group home now unwilling to take him back. Has had numerous CODE 21s called this stay. Seen by psych, meds adjusted. Is currently under civil commitment and court hold through [facility] until 7/31/21. Earlier on in stay, psych had recommended geripsych placement but per evaluation on 12/18, no longer felt this was needed and recommended to continue current meds. QTc 428 on EKG on 12/18. Seen again by psych on 1/5/21 and again not felt to need inpatient psych stay.   -- Psych re-consulted today, 2/15. Continue existing care with no changes  -- On Haldol 10mg TID, memantine 5 mg daily, venlafaxine 75 mg daily, benztropine 0.25 mg BID  -- Haloperidol, olanzapine, and lorazepam PRN for acute agitation   *Had isolated episode of orthostatic hypotension after receiving olanzapine, but no recurrence  -- Door alarm in place due to high risk for elopement  -- SW following for placement     Back cellulitis, Resolved  Noted on 1/24, initiated on cephalexin at that time. Received local wound care and completed 7-day course of antibiotics on 1/30    Baseline Hypotension  BP 90s systolic on average.  Asymptomatic. No concerns     Hyperlipidemia  Chronic and stable on atorvastatin     COPD  Chronic and stable on salmeterol, albuterol prn     Hypothyroidism  Chronic and stable on levothyroxine     Tobacco use disorder  Using nicotine patch and PRN gum     Resting tremor of upper extremities  No acute issues      Asymptomatic COVID19 screening: negative on 12/6/2020     Diet: Room Service  Diet  Combination Diet Regular Diet Adult; Safe Tray - with utensils    DVT Prophylaxis: Pneumatic Compression Devices  Valentin Catheter: not present  Code Status: Full Code         Disposition: Expected discharge pending placement. Awaiting guardianship    Elva Solano MD  Hospitalist Service  [facility]  Contact information available via [facility] Paging/Directory    ______________________________________________________________________    Interval History   No events overnight. Offers no concerns this morning. Denies pain or nausea.     Data reviewed today: I reviewed all medications, new labs and imaging results over the last 24 hours. I personally reviewed no images or EKG's today.    Physical Exam   Vital Signs: Temp: 97.3  F (36.3  C) Temp src: Oral BP: 106/70 Pulse: 72   Resp: 16 SpO2: 97 % O2 Device: None (Room air)    Weight: 182 lbs 0 oz  Constitutional: Resting comfortably, NAD  Respiratory: Breathing non-labored.  Skin/Integument: No rash  Neuro: Alert, answers 'yes/no' appropriately, MICHAELS  Psych: Calm and cooperative    Data   No lab results found in last 7 days.      No results found for this or any previous visit (from the past 24 hour(s)).    Medications       aspirin  81 mg Oral Daily     atorvastatin  80 mg Oral At Bedtime     benztropine  0.25 mg Oral BID     docusate sodium  100 mg Oral BID     haloperidol  10 mg Oral TID     levothyroxine  88 mcg Oral Daily     memantine  5 mg Oral Daily     nicotine  1 patch Transdermal Daily     nicotine   Transdermal Q8H     salmeterol  1 puff  Inhalation BID     venlafaxine  75 mg Oral Daily with breakfast

## 2021-02-15 NOTE — PLAN OF CARE
Alert, oriented to place and self. Behavior labile, mostly calm throughout evening and overnight. Independent in room. Door alarm in place. Continent. Psych to re-consult. Discharge pending.

## 2021-02-15 NOTE — PLAN OF CARE
Alert, d/o to situation. Flat affect and whispers when communicating. VSS on RA. Up ind, door alarm on. Psych to re-consult today. Scab to lower back, MABLE. Committed to end of July, sister is applying for guardianship. Continue to monitor.

## 2021-02-16 PROCEDURE — 250N000013 HC RX MED GY IP 250 OP 250 PS 637: Performed by: HOSPITALIST

## 2021-02-16 PROCEDURE — G0463 HOSPITAL OUTPT CLINIC VISIT: HCPCS

## 2021-02-16 PROCEDURE — 120N000001 HC R&B MED SURG/OB

## 2021-02-16 PROCEDURE — 250N000013 HC RX MED GY IP 250 OP 250 PS 637: Performed by: INTERNAL MEDICINE

## 2021-02-16 PROCEDURE — 99231 SBSQ HOSP IP/OBS SF/LOW 25: CPT | Performed by: INTERNAL MEDICINE

## 2021-02-16 PROCEDURE — 250N000013 HC RX MED GY IP 250 OP 250 PS 637: Performed by: PSYCHIATRY & NEUROLOGY

## 2021-02-16 PROCEDURE — 99207 PR CDG-CUT & PASTE-POTENTIAL IMPACT ON LEVEL: CPT | Performed by: INTERNAL MEDICINE

## 2021-02-16 RX ADMIN — SALMETEROL XINAFOATE 1 PUFF: 50 POWDER, METERED ORAL; RESPIRATORY (INHALATION) at 21:56

## 2021-02-16 RX ADMIN — HALOPERIDOL 10 MG: 5 TABLET ORAL at 17:11

## 2021-02-16 RX ADMIN — HALOPERIDOL 10 MG: 5 TABLET ORAL at 08:59

## 2021-02-16 RX ADMIN — OLANZAPINE 5 MG: 5 TABLET, ORALLY DISINTEGRATING ORAL at 12:38

## 2021-02-16 RX ADMIN — Medication 0.25 MG: at 21:52

## 2021-02-16 RX ADMIN — ATORVASTATIN CALCIUM 80 MG: 40 TABLET, FILM COATED ORAL at 21:51

## 2021-02-16 RX ADMIN — ASPIRIN 81 MG: 81 TABLET, COATED ORAL at 08:58

## 2021-02-16 RX ADMIN — LEVOTHYROXINE SODIUM 88 MCG: 88 TABLET ORAL at 08:59

## 2021-02-16 RX ADMIN — SALMETEROL XINAFOATE 1 PUFF: 50 POWDER, METERED ORAL; RESPIRATORY (INHALATION) at 11:42

## 2021-02-16 RX ADMIN — HALOPERIDOL 10 MG: 5 TABLET ORAL at 21:51

## 2021-02-16 RX ADMIN — Medication 0.25 MG: at 08:59

## 2021-02-16 RX ADMIN — DOCUSATE SODIUM 100 MG: 100 CAPSULE, LIQUID FILLED ORAL at 21:51

## 2021-02-16 RX ADMIN — MEMANTINE 5 MG: 5 TABLET ORAL at 08:59

## 2021-02-16 RX ADMIN — VENLAFAXINE HYDROCHLORIDE 75 MG: 75 CAPSULE, EXTENDED RELEASE ORAL at 08:59

## 2021-02-16 RX ADMIN — DOCUSATE SODIUM 100 MG: 100 CAPSULE, LIQUID FILLED ORAL at 08:59

## 2021-02-16 ASSESSMENT — ACTIVITIES OF DAILY LIVING (ADL)
ADLS_ACUITY_SCORE: 11

## 2021-02-16 NOTE — PLAN OF CARE
Disoriented to situation.  Flat affect.  Agitated later in shift and wanting his clothes and to leave.  Prn zyprexa given, redirected with food and distraction.  Independent in room.  Door alarm.  Psych consulted today.

## 2021-02-16 NOTE — PROGRESS NOTES
[facility] WOC Nurse Inpatient Wound Assessment    Assessment of wound(s) on pt's:  Back wound     WOC NURSE ASSESSMENT     Mid-back wound: Area is completely with re-epithelialization. Scar tissue formation that is deep pink.   No further wound care tx required. WOC will sign off     WOC NURSE TREATMENT PLAN    Wound care back: wound healed. Will discontinue wound care orders    OBJECTIVE DATA    Patient history according to provider notes:  John Juárez is a 55 year old male with PMHx of schizophrenia, hx of TBI, alcohol use disorder, COPD, hyperlipidemia and hypothyroidism who was admitted on 9/9/2020 for evaluation of aggressive behavior at his group home.  He has been evaluated by Psychiatry and his medical condition remains stable, though his group home is now unwilling to take him back and thus hospitalization has been prolonged awaiting new placement.      Neurocognitive disorder dt hx of TBI and alcohol use disorder  Schizophrenia  Under commitment  Had been residing in group home prior to admission.  Brought to hospital for evaluation of aggressive behaviors. Group home now unwilling to take him back. Has had numerous CODE 21s called this stay. Seen by psych, meds adjusted. Is currently under civil commitment and court hold through [facility] until 7/31/21. Earlier on in stay, psych had recommended geripsych placement but per evaluation on 12/18, no longer felt this was needed and recommended to continue current meds. QTc 428 on EKG on 12/18. Seen again by psych on 1/5/21 and again not felt to need inpatient psych stay.   - On Haldol 10mg TID, memantine 5 mg daily, venlafaxine 75 mg daily, benztropine 0.25 mg BID  - Haloperidol, olanzapine, and lorazepam PRN for acute agitation  - Door alarm in place and necessary as patient has attempted elopement as recent as 1/16  - SW following for placement     Cellulitis - NEW  Assessment: midline back shows wound with mild drainage and surrounding  erythema  Plan:  - Keflex QID started  - WOC     Baseline Hypotension  Systolic BP generally around 90s-100s . Asymptomatic. No concerns.     Dyslipidemia  LDL 43, HDL 32 Jan 2020.   - continue atorvastatin     COPD  Chronic and stable on RA.   - continue salmeterol, albuterol prn  Rinku Risk Assessment  Sensory Perception: 4-->no impairment    Moisture: 4-->rarely moist   Activity: 3-->walks occasionally     Mobility: 4-->no limitation   Nutrition: 3-->adequate   Friction and Shear: 3-->no apparent problem      Positioning: Rt/Lt positioning only    Mattress:  Atmos air    Current diet  Orders Placed This Encounter      Diet      Combination Diet Regular Diet Adult; Safe Tray - with utensils      Moisture Management:  BRP     Labs:   Recent Labs   Lab Test 01/24/21  0834 12/03/20  0833 12/03/20  0833 06/02/14 2018 06/02/14 2018   ALBUMIN  --   --  3.7   < > 4.0   HGB 14.3   < > 16.4   < > 14.2   INR  --   --   --   --  0.99   WBC 6.2   < > 5.4   < > 6.5    < > = values in this interval not displayed.         [facility] NURSE PHYSICAL EXAM  Wound History:   First noted on 1-21-21 by Nursing staff  Date of last  [facility] photo:1-29-21                      [facility] Photo: 2-9-21                                                  Size:  .3cm x 3cm previous open wound new re-epithelialized with skin intact  Odor: none  Hilary-wound skin: pinkish scar tissue formation   Pain:  Denies    WOC NURSE INTERVENTIONS    Assessed   Wound Care:   Will be discontinued, wound healed  Order: discontinued    Paola Fernandez RN WOCN

## 2021-02-16 NOTE — PLAN OF CARE
Alert to self only.  Became agitated this afternoon, PRN zyprexa given.  Otherwise calm and cooperative.  Up independently.  Door alarm in place.  Scab on back healed, scar present.  Court ordered commitment remains until July, sister applying for guardianship.

## 2021-02-16 NOTE — PLAN OF CARE
D/o time, situation, sometimes place. Flat affect, whispers. Up ind in room, door alarm active. Scab on lower back from previous cellulitis. Court ordered commitment remains until July, sister applying guardianship. Discharge pending, likely to locked unit. Slept most of night.

## 2021-02-16 NOTE — PROGRESS NOTES
[facility]    Medicine Progress Note - Hospitalist Service       Date of Admission:  9/9/2020    Assessment & Plan   John Juárez is a 55 year old male with PMHx of schizophrenia, hx of TBI, alcohol use disorder, COPD, hyperlipidemia and hypothyroidism who was admitted on 9/9/2020 for evaluation of aggressive behavior at his group home.  He has been evaluated by Psychiatry and his medical condition remains stable, though his group home is now unwilling to take him back and thus hospitalization has been prolonged awaiting new placement. Under civil commitment through [facility]     Neurocognitive disorder dt hx of TBI and alcohol use disorder  Schizophrenia  Under commitment  Had been residing in group home prior to admission.  Brought to hospital for evaluation of aggressive behaviors. Group home now unwilling to take him back. Has had numerous CODE 21s called this stay. Seen by psych, meds adjusted. Is currently under civil commitment and court hold through [facility] until 7/31/21. Earlier on in stay, psych had recommended geripsych placement but per evaluation on 12/18, no longer felt this was needed and recommended to continue current meds. QTc 428 on EKG on 12/18. Seen again by psych on 1/5/21 and again on 2/15, not felt to need inpatient psych stay.   -- On Haldol 10mg TID, memantine 5 mg daily, venlafaxine 75 mg daily, benztropine 0.25 mg BID  -- Haloperidol, olanzapine, and lorazepam PRN for acute agitation   *Had isolated episode of orthostatic hypotension after receiving olanzapine, but no recurrence  -- Door alarm in place due to high risk for elopement  -- SW following for placement     Back cellulitis, Resolved  Noted on 1/24, initiated on cephalexin at that time. Received local wound care and completed 7-day course of antibiotics on 1/30    Baseline Hypotension  BP 90s systolic on average. Asymptomatic. No concerns     Hyperlipidemia  Chronic and stable on  atorvastatin     COPD  Chronic and stable on salmeterol, albuterol prn     Hypothyroidism  Chronic and stable on levothyroxine     Tobacco use disorder  Using nicotine patch and PRN gum     Resting tremor of upper extremities  No acute issues      Asymptomatic COVID19 screening: negative on 12/6/2020     Diet: Room Service  Diet  Combination Diet Regular Diet Adult; Safe Tray - with utensils    DVT Prophylaxis: Pneumatic Compression Devices  Valentin Catheter: not present  Code Status: Full Code         Disposition: Expected discharge pending placement. Awaiting guardianship    Elva Solano MD  Hospitalist Service  [facility]  Contact information available via [facility] Paging/Directory    ______________________________________________________________________    Interval History   No events overnight. Offers no concerns this morning. Denies pain or nausea.     Data reviewed today: I reviewed all medications, new labs and imaging results over the last 24 hours. I personally reviewed no images or EKG's today.    Physical Exam   Vital Signs: Temp: 96.8  F (36  C) Temp src: Oral BP: (!) 88/57 Pulse: 66   Resp: 14 SpO2: 96 % O2 Device: None (Room air)    Weight: 182 lbs 0 oz  Constitutional: Resting comfortably, NAD  Respiratory: Breathing non-labored.  Skin/Integument: No rash  Neuro: Alert, answers 'yes/no' appropriately, MICHAELS  Psych: Calm and cooperative    Data   No lab results found in last 7 days.      No results found for this or any previous visit (from the past 24 hour(s)).    Medications       aspirin  81 mg Oral Daily     atorvastatin  80 mg Oral At Bedtime     benztropine  0.25 mg Oral BID     docusate sodium  100 mg Oral BID     haloperidol  10 mg Oral TID     levothyroxine  88 mcg Oral Daily     memantine  5 mg Oral Daily     nicotine  1 patch Transdermal Daily     nicotine   Transdermal Q8H     salmeterol  1 puff Inhalation BID     venlafaxine  75 mg Oral Daily with breakfast

## 2021-02-17 PROCEDURE — 99231 SBSQ HOSP IP/OBS SF/LOW 25: CPT | Performed by: INTERNAL MEDICINE

## 2021-02-17 PROCEDURE — 120N000001 HC R&B MED SURG/OB

## 2021-02-17 PROCEDURE — 250N000013 HC RX MED GY IP 250 OP 250 PS 637: Performed by: PSYCHIATRY & NEUROLOGY

## 2021-02-17 PROCEDURE — 250N000013 HC RX MED GY IP 250 OP 250 PS 637: Performed by: INTERNAL MEDICINE

## 2021-02-17 PROCEDURE — 250N000013 HC RX MED GY IP 250 OP 250 PS 637: Performed by: HOSPITALIST

## 2021-02-17 PROCEDURE — 99207 PR CDG-CUT & PASTE-POTENTIAL IMPACT ON LEVEL: CPT | Performed by: INTERNAL MEDICINE

## 2021-02-17 RX ADMIN — ATORVASTATIN CALCIUM 80 MG: 40 TABLET, FILM COATED ORAL at 20:21

## 2021-02-17 RX ADMIN — SALMETEROL XINAFOATE 1 PUFF: 50 POWDER, METERED ORAL; RESPIRATORY (INHALATION) at 09:50

## 2021-02-17 RX ADMIN — HALOPERIDOL 10 MG: 5 TABLET ORAL at 23:40

## 2021-02-17 RX ADMIN — ASPIRIN 81 MG: 81 TABLET, COATED ORAL at 09:42

## 2021-02-17 RX ADMIN — Medication 0.25 MG: at 09:43

## 2021-02-17 RX ADMIN — Medication 0.25 MG: at 20:21

## 2021-02-17 RX ADMIN — VENLAFAXINE HYDROCHLORIDE 75 MG: 75 CAPSULE, EXTENDED RELEASE ORAL at 09:44

## 2021-02-17 RX ADMIN — LEVOTHYROXINE SODIUM 88 MCG: 88 TABLET ORAL at 09:42

## 2021-02-17 RX ADMIN — DOCUSATE SODIUM 100 MG: 100 CAPSULE, LIQUID FILLED ORAL at 09:42

## 2021-02-17 RX ADMIN — MELATONIN TAB 3 MG 3 MG: 3 TAB at 20:21

## 2021-02-17 RX ADMIN — HALOPERIDOL 10 MG: 5 TABLET ORAL at 09:43

## 2021-02-17 RX ADMIN — DOCUSATE SODIUM 100 MG: 100 CAPSULE, LIQUID FILLED ORAL at 20:21

## 2021-02-17 RX ADMIN — HALOPERIDOL 10 MG: 5 TABLET ORAL at 14:51

## 2021-02-17 RX ADMIN — MEMANTINE 5 MG: 5 TABLET ORAL at 09:42

## 2021-02-17 ASSESSMENT — ACTIVITIES OF DAILY LIVING (ADL)
ADLS_ACUITY_SCORE: 11

## 2021-02-17 NOTE — PLAN OF CARE
D/O to place, time, & situation. VSS, RA. Flat affect. Calm & cooperative. Easily redirected. Tolerating reg diet, good appetite. Door alarm. Scab to back from previous cellulitis. Plan pending.

## 2021-02-17 NOTE — PROGRESS NOTES
[facility]    Medicine Progress Note - Hospitalist Service       Date of Admission:  9/9/2020    Assessment & Plan   John Juárez is a 55 year old male with PMHx of schizophrenia, hx of TBI, alcohol use disorder, COPD, hyperlipidemia and hypothyroidism who was admitted on 9/9/2020 for evaluation of aggressive behavior at his group home.  He has been evaluated by Psychiatry and his medical condition remains stable, though his group home is now unwilling to take him back and thus hospitalization has been prolonged awaiting new placement. Under civil commitment through [facility]     Neurocognitive disorder dt hx of TBI and alcohol use disorder  Schizophrenia  Under commitment  Had been residing in group home prior to admission.  Brought to hospital for evaluation of aggressive behaviors. Group home now unwilling to take him back. Has had numerous CODE 21s called this stay. Seen by psych, meds adjusted. Is currently under civil commitment and court hold through [facility] until 7/31/21. Earlier on in stay, psych had recommended geripsych placement but per evaluation on 12/18, no longer felt this was needed and recommended to continue current meds. QTc 428 on EKG on 12/18. Seen again by psych on 1/5/21 and again on 2/15, not felt to need inpatient psych stay.   -- On Haldol 10mg TID, memantine 5 mg daily, venlafaxine 75 mg daily, benztropine 0.25 mg BID  -- Haloperidol, olanzapine, and lorazepam PRN for acute agitation   *Had isolated episode of orthostatic hypotension after receiving olanzapine, but no recurrence  -- Door alarm in place due to high risk for elopement  -- SW following for placement     Back cellulitis, Resolved  Noted on 1/24, initiated on cephalexin at that time. Received local wound care and completed 7-day course of antibiotics on 1/30    Baseline Hypotension  BP 90s systolic on average. Asymptomatic. No concerns     Hyperlipidemia  Chronic and stable on  atorvastatin     COPD  Chronic and stable on salmeterol, albuterol prn     Hypothyroidism  Chronic and stable on levothyroxine     Tobacco use disorder  Using nicotine patch and PRN gum     Resting tremor of upper extremities  No acute issues      Asymptomatic COVID19 screening: negative on 12/6/2020     Diet: Room Service  Diet  Combination Diet Regular Diet Adult; Safe Tray - with utensils    DVT Prophylaxis: Pneumatic Compression Devices  Valentin Catheter: not present  Code Status: Full Code         Disposition: Expected discharge pending placement. Awaiting guardianship    Elva Solano MD  Hospitalist Service  [facility]  Contact information available via [facility] Paging/Directory    ______________________________________________________________________    Interval History   No events overnight. Offers no concerns this morning. Denies pain or nausea.     Data reviewed today: I reviewed all medications, new labs and imaging results over the last 24 hours. I personally reviewed no images or EKG's today.    Physical Exam   Vital Signs: Temp: 97  F (36.1  C) Temp src: Oral BP: 120/84 Pulse: 75   Resp: 18 SpO2: 98 % O2 Device: None (Room air)    Weight: 182 lbs 0 oz  Constitutional: Resting comfortably, NAD  Respiratory: Breathing non-labored.  Skin/Integument: No rash  Neuro: Alert, answers 'yes/no' appropriately, MICHAELS  Psych: Calm and cooperative    Data   No lab results found in last 7 days.      No results found for this or any previous visit (from the past 24 hour(s)).    Medications       aspirin  81 mg Oral Daily     atorvastatin  80 mg Oral At Bedtime     benztropine  0.25 mg Oral BID     docusate sodium  100 mg Oral BID     haloperidol  10 mg Oral TID     levothyroxine  88 mcg Oral Daily     memantine  5 mg Oral Daily     nicotine  1 patch Transdermal Daily     nicotine   Transdermal Q8H     salmeterol  1 puff Inhalation BID     venlafaxine  75 mg Oral Daily with breakfast

## 2021-02-18 PROCEDURE — 250N000013 HC RX MED GY IP 250 OP 250 PS 637: Performed by: HOSPITALIST

## 2021-02-18 PROCEDURE — 120N000001 HC R&B MED SURG/OB

## 2021-02-18 PROCEDURE — 250N000013 HC RX MED GY IP 250 OP 250 PS 637: Performed by: PSYCHIATRY & NEUROLOGY

## 2021-02-18 PROCEDURE — 250N000013 HC RX MED GY IP 250 OP 250 PS 637: Performed by: INTERNAL MEDICINE

## 2021-02-18 PROCEDURE — 99231 SBSQ HOSP IP/OBS SF/LOW 25: CPT | Performed by: HOSPITALIST

## 2021-02-18 RX ADMIN — SALMETEROL XINAFOATE 1 PUFF: 50 POWDER, METERED ORAL; RESPIRATORY (INHALATION) at 09:01

## 2021-02-18 RX ADMIN — HALOPERIDOL 5 MG: 5 TABLET ORAL at 16:21

## 2021-02-18 RX ADMIN — VENLAFAXINE HYDROCHLORIDE 75 MG: 75 CAPSULE, EXTENDED RELEASE ORAL at 08:55

## 2021-02-18 RX ADMIN — HALOPERIDOL 10 MG: 5 TABLET ORAL at 15:18

## 2021-02-18 RX ADMIN — DOCUSATE SODIUM 100 MG: 100 CAPSULE, LIQUID FILLED ORAL at 08:55

## 2021-02-18 RX ADMIN — HALOPERIDOL 10 MG: 5 TABLET ORAL at 21:01

## 2021-02-18 RX ADMIN — MEMANTINE 5 MG: 5 TABLET ORAL at 08:55

## 2021-02-18 RX ADMIN — Medication 0.25 MG: at 08:55

## 2021-02-18 RX ADMIN — ASPIRIN 81 MG: 81 TABLET, COATED ORAL at 08:55

## 2021-02-18 RX ADMIN — DOCUSATE SODIUM 100 MG: 100 CAPSULE, LIQUID FILLED ORAL at 21:01

## 2021-02-18 RX ADMIN — LEVOTHYROXINE SODIUM 88 MCG: 88 TABLET ORAL at 08:56

## 2021-02-18 RX ADMIN — SALMETEROL XINAFOATE 1 PUFF: 50 POWDER, METERED ORAL; RESPIRATORY (INHALATION) at 21:04

## 2021-02-18 RX ADMIN — ATORVASTATIN CALCIUM 80 MG: 40 TABLET, FILM COATED ORAL at 21:01

## 2021-02-18 RX ADMIN — Medication 0.25 MG: at 21:01

## 2021-02-18 RX ADMIN — HALOPERIDOL 10 MG: 5 TABLET ORAL at 08:55

## 2021-02-18 ASSESSMENT — ACTIVITIES OF DAILY LIVING (ADL)
ADLS_ACUITY_SCORE: 11

## 2021-02-18 NOTE — PLAN OF CARE
Door alarm on for elopement, and on legal hold until July 2021. Alert to self only. Flat affect, whispers, and is reserved. VSS on RA. Denies pain.Up independently in room. Tolerating regular diet with good appetite. No IV access. Back wound healed with granulation tissue. Refused shower offer. Waiting for guardianship per last SW note, and sister plans to apply for gaurdianship. Discharge pending.

## 2021-02-18 NOTE — PROGRESS NOTES
[facility]    Medicine Progress Note - Hospitalist Service  Date of Admission:  9/9/2020    When I evaluated patient: 02/18/2021    Assessment & Plan   John Juárez is a 55 year old male with PMHx of schizophrenia, hx of TBI, alcohol use disorder, COPD, hyperlipidemia and hypothyroidism who was admitted on 9/9/2020 for evaluation of aggressive behavior at his group home.  He has been evaluated by Psychiatry and his medical condition remains stable, though his group home is now unwilling to take him back and thus hospitalization has been prolonged awaiting new placement. Under civil commitment through [facility]     Neurocognitive disorder dt hx of TBI and alcohol use disorder  Schizophrenia  Under commitment  Had been residing in group home prior to admission. Brought to hospital for evaluation of aggressive behaviors. Group home now unwilling to take him back. Has had numerous CODE 21s called this stay. Seen by psych, meds adjusted. Is currently under civil commitment and court hold through [facility] until 7/31/21. Earlier on in stay, psych had recommended geripsych placement but per evaluation on 12/18, no longer felt this was needed and recommended to continue current meds. QTc 428 on EKG on 12/18. Seen again by psych on 1/5/21 and again on 2/15, not felt to need inpatient psych stay.   -- On Haldol 10mg TID, memantine 5 mg daily, venlafaxine 75 mg daily, benztropine 0.25 mg BID  -- Haloperidol, olanzapine, and lorazepam PRN for acute agitation   *Had isolated episode of orthostatic hypotension after receiving olanzapine, but no recurrence  -- Door alarm in place due to high risk for elopement  -- SW following for placement     Back cellulitis, Resolved  Noted on 1/24, initiated on cephalexin at that time. Received local wound care and completed 7-day course of antibiotics on 1/30    Baseline Hypotension  BP 90s systolic on average. Asymptomatic. No  concerns     Hyperlipidemia  Chronic and stable on atorvastatin     COPD  Chronic and stable on salmeterol, albuterol prn     Hypothyroidism  Chronic and stable on levothyroxine     Tobacco use disorder  Using nicotine patch and PRN gum     Resting tremor of upper extremities  No acute issues      Asymptomatic COVID19 screening: negative on 12/6/2020     Diet: Room Service  Diet  Combination Diet Regular Diet Adult; Safe Tray - with utensils    DVT Prophylaxis: Pneumatic Compression Devices  Valentin Catheter: not present  Code Status: Full Code         Disposition: Expected discharge pending placement. Awaiting guardianship    Hermilo Sousa MD  Hospitalist Service  [facility]  Contact information available via [facility] Paging/Directory    ______________________________________________________________________    Interval History   No events overnight. Offers no concerns this morning. Denies pain or nausea.     Data reviewed today: I reviewed all medications, new labs and imaging results over the last 24 hours. I personally reviewed no images or EKG's today.    Physical Exam   Vital Signs:                    Weight: 182 lbs 0 oz     Constitutional: Resting comfortably, NAD  Respiratory: Breathing non-labored.  Skin/Integument: No rash  Neuro: Alert, answers 'yes/no' in soft whispering voice  Psych: Calm and cooperative    Data   No lab results found in last 7 days.      No results found for this or any previous visit (from the past 24 hour(s)).    Medications       aspirin  81 mg Oral Daily     atorvastatin  80 mg Oral At Bedtime     benztropine  0.25 mg Oral BID     docusate sodium  100 mg Oral BID     haloperidol  10 mg Oral TID     levothyroxine  88 mcg Oral Daily     memantine  5 mg Oral Daily     nicotine   Transdermal Q8H     salmeterol  1 puff Inhalation BID     venlafaxine  75 mg Oral Daily with breakfast

## 2021-02-19 PROCEDURE — 250N000013 HC RX MED GY IP 250 OP 250 PS 637: Performed by: HOSPITALIST

## 2021-02-19 PROCEDURE — 99231 SBSQ HOSP IP/OBS SF/LOW 25: CPT | Performed by: HOSPITALIST

## 2021-02-19 PROCEDURE — 250N000013 HC RX MED GY IP 250 OP 250 PS 637: Performed by: PSYCHIATRY & NEUROLOGY

## 2021-02-19 PROCEDURE — 120N000001 HC R&B MED SURG/OB

## 2021-02-19 PROCEDURE — 250N000013 HC RX MED GY IP 250 OP 250 PS 637: Performed by: INTERNAL MEDICINE

## 2021-02-19 RX ADMIN — DOCUSATE SODIUM 100 MG: 100 CAPSULE, LIQUID FILLED ORAL at 08:57

## 2021-02-19 RX ADMIN — SALMETEROL XINAFOATE 1 PUFF: 50 POWDER, METERED ORAL; RESPIRATORY (INHALATION) at 20:44

## 2021-02-19 RX ADMIN — ASPIRIN 81 MG: 81 TABLET, COATED ORAL at 08:56

## 2021-02-19 RX ADMIN — HALOPERIDOL 10 MG: 5 TABLET ORAL at 08:56

## 2021-02-19 RX ADMIN — VENLAFAXINE HYDROCHLORIDE 75 MG: 75 CAPSULE, EXTENDED RELEASE ORAL at 08:56

## 2021-02-19 RX ADMIN — Medication 0.25 MG: at 08:56

## 2021-02-19 RX ADMIN — Medication 0.25 MG: at 20:44

## 2021-02-19 RX ADMIN — LEVOTHYROXINE SODIUM 88 MCG: 88 TABLET ORAL at 08:56

## 2021-02-19 RX ADMIN — MEMANTINE 5 MG: 5 TABLET ORAL at 08:56

## 2021-02-19 RX ADMIN — OLANZAPINE 5 MG: 5 TABLET, ORALLY DISINTEGRATING ORAL at 12:39

## 2021-02-19 RX ADMIN — DOCUSATE SODIUM 100 MG: 100 CAPSULE, LIQUID FILLED ORAL at 20:43

## 2021-02-19 RX ADMIN — ATORVASTATIN CALCIUM 80 MG: 40 TABLET, FILM COATED ORAL at 20:43

## 2021-02-19 RX ADMIN — HALOPERIDOL 10 MG: 5 TABLET ORAL at 21:15

## 2021-02-19 ASSESSMENT — ACTIVITIES OF DAILY LIVING (ADL)
ADLS_ACUITY_SCORE: 11
ADLS_ACUITY_SCORE: 10
ADLS_ACUITY_SCORE: 11

## 2021-02-19 NOTE — PLAN OF CARE
Pt alert, oriented to self only. Door alarm in use for elopement. Calm/cooperative majority of shift, but did get more agitated/restless this after afternoon- PRN oral haldol given with good effect. Denies pain. LS clear. Declined shower but allowed staff to assist with beard trimming this morning. Independent in room. Discharge pending.

## 2021-02-19 NOTE — PLAN OF CARE
No acute events overnight. Door alarm in place. On Commitment. Alert to self only. Calm and cooperative this shift. Up independently in room. Tolerating regular diet with good appetite. Skin intact. Pink granulation tissue present on back. Appeared to sleep well for several hours. Waiting for guardianship per last SW note; sister plans to apply for gaurdianship. Discharge pending placement.

## 2021-02-19 NOTE — PROGRESS NOTES
[facility]    Medicine Progress Note - Hospitalist Service  Date of Admission:  9/9/2020    When I evaluated patient: 02/19/2021    Assessment & Plan   John Juárez is a 55 year old male with PMHx of schizophrenia, hx of TBI, alcohol use disorder, COPD, hyperlipidemia and hypothyroidism who was admitted on 9/9/2020 for evaluation of aggressive behavior at his group home.  He has been evaluated by Psychiatry and his medical condition remains stable, though his group home is now unwilling to take him back and thus hospitalization has been prolonged awaiting new placement. Under civil commitment through [facility]     Neurocognitive disorder dt hx of TBI and alcohol use disorder  Schizophrenia  Under commitment  Had been residing in group home prior to admission. Brought to hospital for evaluation of aggressive behaviors. Group home now unwilling to take him back. Has had numerous CODE 21s called this stay. Seen by psych, meds adjusted. Is currently under civil commitment and court hold through [facility] until 7/31/21. Earlier on in stay, psych had recommended geripsych placement but per evaluation on 12/18, no longer felt this was needed and recommended to continue current meds. QTc 428 on EKG on 12/18. Seen again by psych on 1/5/21 and again on 2/15, not felt to need inpatient psych stay.   -- On Haldol 10mg TID, memantine 5 mg daily, venlafaxine 75 mg daily, benztropine 0.25 mg BID  -- Haloperidol, olanzapine, and lorazepam PRN for acute agitation   *Had isolated episode of orthostatic hypotension after receiving olanzapine, but no recurrence  -- Door alarm in place due to high risk for elopement  -- SW following for placement     Back cellulitis, Resolved  Noted on 1/24, initiated on cephalexin at that time. Received local wound care and completed 7-day course of antibiotics on 1/30    Baseline Hypotension  BP 90s systolic on average. Asymptomatic. No  concerns     Hyperlipidemia  Chronic and stable on atorvastatin     COPD  Chronic and stable on salmeterol, albuterol prn     Hypothyroidism  Chronic and stable on levothyroxine     Tobacco use disorder  Using nicotine patch and PRN gum     Resting tremor of upper extremities ?TD due to medication  No acute issues   If worsens, will consider psychiatry consult     Asymptomatic COVID19 screening: negative on 12/6/2020     Diet: Room Service  Diet  Combination Diet Regular Diet Adult; Safe Tray - with utensils    DVT Prophylaxis: Pneumatic Compression Devices  Valentin Catheter: not present  Code Status: Full Code         Disposition: Expected discharge pending placement. Awaiting guardianship    Hermilo Sousa MD  Hospitalist Service  [facility]  Contact information available via [facility] Paging/Directory    ______________________________________________________________________    Interval History   No events overnight. Offers no concerns this morning. Denies pain or nausea. Per RN, some tremor of Lt upper extremity.     Data reviewed today: I reviewed all medications, new labs and imaging results over the last 24 hours. I personally reviewed no images or EKG's today.    Physical Exam   Vital Signs: Temp: 97.4  F (36.3  C) Temp src: Oral BP: 103/71 Pulse: 73   Resp: 18 SpO2: 95 % O2 Device: None (Room air)    Weight: 182 lbs 0 oz     Constitutional: Resting comfortably, NAD  Respiratory: Breathing non-labored.  Skin/Integument: No rash  Neuro: Alert, answers 'yes/no'. Was resting comfortably and did not noted tremor  Psych: Calm and cooperative    Data   No lab results found in last 7 days.      No results found for this or any previous visit (from the past 24 hour(s)).    Medications       aspirin  81 mg Oral Daily     atorvastatin  80 mg Oral At Bedtime     benztropine  0.25 mg Oral BID     docusate sodium  100 mg Oral BID     haloperidol  10 mg Oral TID     levothyroxine  88 mcg Oral Daily      memantine  5 mg Oral Daily     salmeterol  1 puff Inhalation BID     venlafaxine  75 mg Oral Daily with breakfast

## 2021-02-19 NOTE — PLAN OF CARE
Door alarm present. On Commitment. Alert to self only. VSS on RA. Calm and cooperative this shift. Up independently in room. Tolerating regular diet with good appetite. Skin intact. Pink granulation tissue present on back. Discharge pending placement. Waiting for gaurdianship.

## 2021-02-20 PROCEDURE — 250N000013 HC RX MED GY IP 250 OP 250 PS 637: Performed by: PSYCHIATRY & NEUROLOGY

## 2021-02-20 PROCEDURE — 250N000013 HC RX MED GY IP 250 OP 250 PS 637: Performed by: INTERNAL MEDICINE

## 2021-02-20 PROCEDURE — 250N000013 HC RX MED GY IP 250 OP 250 PS 637: Performed by: NURSE PRACTITIONER

## 2021-02-20 PROCEDURE — 250N000013 HC RX MED GY IP 250 OP 250 PS 637: Performed by: HOSPITALIST

## 2021-02-20 PROCEDURE — 99231 SBSQ HOSP IP/OBS SF/LOW 25: CPT | Performed by: HOSPITALIST

## 2021-02-20 PROCEDURE — 120N000001 HC R&B MED SURG/OB

## 2021-02-20 RX ADMIN — DOCUSATE SODIUM 100 MG: 100 CAPSULE, LIQUID FILLED ORAL at 08:51

## 2021-02-20 RX ADMIN — HALOPERIDOL 10 MG: 5 TABLET ORAL at 23:02

## 2021-02-20 RX ADMIN — LEVOTHYROXINE SODIUM 88 MCG: 88 TABLET ORAL at 08:50

## 2021-02-20 RX ADMIN — HALOPERIDOL 10 MG: 5 TABLET ORAL at 16:00

## 2021-02-20 RX ADMIN — DOCUSATE SODIUM 100 MG: 100 CAPSULE, LIQUID FILLED ORAL at 20:06

## 2021-02-20 RX ADMIN — Medication 0.25 MG: at 20:06

## 2021-02-20 RX ADMIN — MEMANTINE 5 MG: 5 TABLET ORAL at 08:51

## 2021-02-20 RX ADMIN — ATORVASTATIN CALCIUM 80 MG: 40 TABLET, FILM COATED ORAL at 20:06

## 2021-02-20 RX ADMIN — OLANZAPINE 5 MG: 5 TABLET, ORALLY DISINTEGRATING ORAL at 19:32

## 2021-02-20 RX ADMIN — ASPIRIN 81 MG: 81 TABLET, COATED ORAL at 08:50

## 2021-02-20 RX ADMIN — NICOTINE POLACRILEX 2 MG: 2 GUM, CHEWING ORAL at 20:06

## 2021-02-20 RX ADMIN — VENLAFAXINE HYDROCHLORIDE 75 MG: 75 CAPSULE, EXTENDED RELEASE ORAL at 08:51

## 2021-02-20 RX ADMIN — OLANZAPINE 5 MG: 5 TABLET, ORALLY DISINTEGRATING ORAL at 20:36

## 2021-02-20 RX ADMIN — Medication 0.25 MG: at 08:51

## 2021-02-20 RX ADMIN — HALOPERIDOL 10 MG: 5 TABLET ORAL at 08:53

## 2021-02-20 RX ADMIN — SALMETEROL XINAFOATE 1 PUFF: 50 POWDER, METERED ORAL; RESPIRATORY (INHALATION) at 08:51

## 2021-02-20 ASSESSMENT — ACTIVITIES OF DAILY LIVING (ADL)
ADLS_ACUITY_SCORE: 11
ADLS_ACUITY_SCORE: 10
ADLS_ACUITY_SCORE: 11

## 2021-02-20 NOTE — PLAN OF CARE
Uneventful shift, confused to place/time/situation. Eager to leave unit this afternoon - given PRN Zyprexa x1, effective. VSS on room air. Up independently in room. Regular diet, need assistance ordering meals. No IV access.

## 2021-02-20 NOTE — PROGRESS NOTES
[facility]    Medicine Progress Note - Hospitalist Service  Date of Admission:  9/9/2020    When I evaluated patient: 02/20/2021    Assessment & Plan   John Juárez is a 55 year old male with PMHx of schizophrenia, hx of TBI, alcohol use disorder, COPD, hyperlipidemia and hypothyroidism who was admitted on 9/9/2020 for evaluation of aggressive behavior at his group home.  He has been evaluated by Psychiatry and his medical condition remains stable, though his group home is now unwilling to take him back and thus hospitalization has been prolonged awaiting new placement. Under civil commitment through [facility]     Neurocognitive disorder dt hx of TBI and alcohol use disorder  Schizophrenia  Under commitment  Had been residing in group home prior to admission. Brought to hospital for evaluation of aggressive behaviors. Group home now unwilling to take him back. Has had numerous CODE 21s called this stay. Seen by psych, meds adjusted. Is currently under civil commitment and court hold through [facility] until 7/31/21. Earlier on in stay, psych had recommended geripsych placement but per evaluation on 12/18, no longer felt this was needed and recommended to continue current meds. QTc 428 on EKG on 12/18. Seen again by psych on 1/5/21 and again on 2/15, not felt to need inpatient psych stay.   -- On Haldol 10mg TID, memantine 5 mg daily, venlafaxine 75 mg daily, benztropine 0.25 mg BID  -- Haloperidol, olanzapine, and lorazepam PRN for acute agitation   *Had isolated episode of orthostatic hypotension after receiving olanzapine, but no recurrence  -- Door alarm in place due to high risk for elopement  -- SW following for placement     Back cellulitis, Resolved  Noted on 1/24, initiated on cephalexin at that time. Received local wound care and completed 7-day course of antibiotics on 1/30    Baseline Hypotension  BP 90s systolic on average. Asymptomatic. No  concerns     Hyperlipidemia  Chronic and stable on atorvastatin     COPD  Chronic and stable on salmeterol, albuterol prn     Hypothyroidism  Chronic and stable on levothyroxine     Tobacco use disorder  Using nicotine patch and PRN gum     Resting tremor of upper extremities ?TD due to medication  No acute issues   If worsens, will consider psychiatry consult     Asymptomatic COVID19 screening: negative on 12/6/2020     Diet: Room Service  Diet  Combination Diet Regular Diet Adult; Safe Tray - with utensils    DVT Prophylaxis: Pneumatic Compression Devices  Valentin Catheter: not present  Code Status: Full Code         Disposition: Expected discharge pending placement. Awaiting guardianship    Hermilo Sousa MD  Hospitalist Service  [facility]  Contact information available via [facility] Paging/Directory    ______________________________________________________________________    Interval History   No events overnight. Offers no concerns this morning. Denies pain or nausea.      Data reviewed today: I reviewed all medications, new labs and imaging results over the last 24 hours. I personally reviewed no images or EKG's today.    Physical Exam   Vital Signs: Temp: 96.1  F (35.6  C) Temp src: Oral BP: (!) 87/58 Pulse: 75   Resp: 16 SpO2: 98 % O2 Device: None (Room air)    Weight: 182 lbs 0 oz     Constitutional: Resting comfortably, NAD  Respiratory: Breathing non-labored.  Skin/Integument: No rash  Neuro: Alert, answers 'yes/no'. Was resting comfortably and did not noted tremor  Psych: Calm and cooperative    Data   No lab results found in last 7 days.      No results found for this or any previous visit (from the past 24 hour(s)).    Medications       aspirin  81 mg Oral Daily     atorvastatin  80 mg Oral At Bedtime     benztropine  0.25 mg Oral BID     docusate sodium  100 mg Oral BID     haloperidol  10 mg Oral TID     levothyroxine  88 mcg Oral Daily     memantine  5 mg Oral Daily      salmeterol  1 puff Inhalation BID     venlafaxine  75 mg Oral Daily with breakfast

## 2021-02-20 NOTE — PLAN OF CARE
Pt a/o to self. Slept throughout the night. No acute events overnight. Up independently in room. Door alarm in place. Regular diet- assistance with ordering meals. No IV access. Back wound open to air. Discharge pending.

## 2021-02-21 LAB
LABORATORY COMMENT REPORT: NORMAL
SARS-COV-2 RNA RESP QL NAA+PROBE: NEGATIVE
SPECIMEN SOURCE: NORMAL

## 2021-02-21 PROCEDURE — 250N000013 HC RX MED GY IP 250 OP 250 PS 637: Performed by: HOSPITALIST

## 2021-02-21 PROCEDURE — 120N000001 HC R&B MED SURG/OB

## 2021-02-21 PROCEDURE — 250N000013 HC RX MED GY IP 250 OP 250 PS 637: Performed by: PSYCHIATRY & NEUROLOGY

## 2021-02-21 PROCEDURE — 87635 SARS-COV-2 COVID-19 AMP PRB: CPT | Performed by: HOSPITALIST

## 2021-02-21 PROCEDURE — 99231 SBSQ HOSP IP/OBS SF/LOW 25: CPT | Performed by: HOSPITALIST

## 2021-02-21 PROCEDURE — 250N000013 HC RX MED GY IP 250 OP 250 PS 637: Performed by: INTERNAL MEDICINE

## 2021-02-21 RX ADMIN — HALOPERIDOL 10 MG: 5 TABLET ORAL at 15:56

## 2021-02-21 RX ADMIN — SALMETEROL XINAFOATE 1 PUFF: 50 POWDER, METERED ORAL; RESPIRATORY (INHALATION) at 08:38

## 2021-02-21 RX ADMIN — LEVOTHYROXINE SODIUM 88 MCG: 88 TABLET ORAL at 08:38

## 2021-02-21 RX ADMIN — DOCUSATE SODIUM 100 MG: 100 CAPSULE, LIQUID FILLED ORAL at 20:00

## 2021-02-21 RX ADMIN — Medication 0.25 MG: at 20:00

## 2021-02-21 RX ADMIN — HALOPERIDOL 10 MG: 5 TABLET ORAL at 22:19

## 2021-02-21 RX ADMIN — MEMANTINE 5 MG: 5 TABLET ORAL at 08:38

## 2021-02-21 RX ADMIN — OLANZAPINE 5 MG: 5 TABLET, ORALLY DISINTEGRATING ORAL at 13:15

## 2021-02-21 RX ADMIN — VENLAFAXINE HYDROCHLORIDE 75 MG: 75 CAPSULE, EXTENDED RELEASE ORAL at 08:38

## 2021-02-21 RX ADMIN — SALMETEROL XINAFOATE 1 PUFF: 50 POWDER, METERED ORAL; RESPIRATORY (INHALATION) at 20:00

## 2021-02-21 RX ADMIN — ASPIRIN 81 MG: 81 TABLET, COATED ORAL at 08:38

## 2021-02-21 RX ADMIN — DOCUSATE SODIUM 100 MG: 100 CAPSULE, LIQUID FILLED ORAL at 08:38

## 2021-02-21 RX ADMIN — HALOPERIDOL 10 MG: 5 TABLET ORAL at 08:38

## 2021-02-21 RX ADMIN — ATORVASTATIN CALCIUM 80 MG: 40 TABLET, FILM COATED ORAL at 20:00

## 2021-02-21 RX ADMIN — Medication 0.25 MG: at 08:38

## 2021-02-21 ASSESSMENT — ACTIVITIES OF DAILY LIVING (ADL)
ADLS_ACUITY_SCORE: 11

## 2021-02-21 NOTE — CODE/RAPID RESPONSE
Brief house NP note:    Code 21 called for attempted elopement. Patient ran down the stairs from the 8th floor to first floor. Patient is well known to the house team for multiple code 21s.     Interventions:  -- give additional 5 mg ODT zyprexa    Please page with additional concerns,     MAIA Vale, CNP  Hospitalist - [facility]  Text Page  [phone])

## 2021-02-21 NOTE — PLAN OF CARE
Alert to self only.  Soft BP, asymptomatic.  Flat affect, cooperative.  Up independently in room, door alarm in place.  Tolerating regular diet. Discharge pending disposition.

## 2021-02-21 NOTE — PROGRESS NOTES
[facility]    Medicine Progress Note - Hospitalist Service  Date of Admission:  9/9/2020    When I evaluated patient: 02/21/2021    Assessment & Plan   John Juárez is a 55 year old male with PMHx of schizophrenia, hx of TBI, alcohol use disorder, COPD, hyperlipidemia and hypothyroidism who was admitted on 9/9/2020 for evaluation of aggressive behavior at his group home.  He has been evaluated by Psychiatry and his medical condition remains stable, though his group home is now unwilling to take him back and thus hospitalization has been prolonged awaiting new placement. Under civil commitment through [facility]     Neurocognitive disorder dt hx of TBI and alcohol use disorder  Schizophrenia  Under commitment  Had been residing in group home prior to admission. Brought to hospital for evaluation of aggressive behaviors. Group home now unwilling to take him back. Has had numerous CODE 21s called this stay. Seen by psych, meds adjusted. Is currently under civil commitment and court hold through [facility] until 7/31/21. Earlier on in stay, psych had recommended geripsych placement but per evaluation on 12/18, no longer felt this was needed and recommended to continue current meds. QTc 428 on EKG on 12/18. Seen again by psych on 1/5/21 and again on 2/15, not felt to need inpatient psych stay.   -- On Haldol 10mg TID, memantine 5 mg daily, venlafaxine 75 mg daily, benztropine 0.25 mg BID  -- Haloperidol, olanzapine, and lorazepam PRN for acute agitation   *Had isolated episode of orthostatic hypotension after receiving olanzapine, but no recurrence  -- Door alarm in place due to high risk for elopement  -- SW following for placement     Back cellulitis, Resolved  Noted on 1/24, initiated on cephalexin at that time. Received local wound care and completed 7-day course of antibiotics on 1/30    Baseline Hypotension  BP 90s systolic on average. Asymptomatic. No  concerns     Hyperlipidemia  Chronic and stable on atorvastatin     COPD  Chronic and stable on salmeterol, albuterol prn     Hypothyroidism  Chronic and stable on levothyroxine     Tobacco use disorder  Using nicotine patch and PRN gum     Resting tremor of upper extremities ?TD due to medication  No acute issues   If worsens, will consider psychiatry consult     Asymptomatic COVID19 screening: negative on 12/6/2020     Diet: Room Service  Diet  Combination Diet Regular Diet Adult; Safe Tray - with utensils    DVT Prophylaxis: Pneumatic Compression Devices  Valentin Catheter: not present  Code Status: Full Code         Disposition: Expected discharge pending placement. Awaiting guardianship    Hermilo Sousa MD  Hospitalist Service  [facility]  Contact information available via [facility] Paging/Directory    ______________________________________________________________________    Interval History   Event noted, attempted to elope yesterday, Code 21 called, received additional zyprexa. Has remained calm since. Did not express any concern this am.     Data reviewed today: I reviewed all medications, new labs and imaging results over the last 24 hours. I personally reviewed no images or EKG's today.    Physical Exam   Vital Signs: Temp: 96.1  F (35.6  C) Temp src: Oral BP: 129/87 Pulse: 74   Resp: 16 SpO2: 98 % O2 Device: None (Room air)    Weight: 182 lbs 0 oz     Constitutional: Resting comfortably, NAD  Respiratory: Breathing non-labored.  Skin/Integument: No rash  Neuro: Alert, answers 'yes/no'. Was resting comfortably and did not noted tremor  Psych: Calm and cooperative    Data   No lab results found in last 7 days.      No results found for this or any previous visit (from the past 24 hour(s)).    Medications       aspirin  81 mg Oral Daily     atorvastatin  80 mg Oral At Bedtime     benztropine  0.25 mg Oral BID     docusate sodium  100 mg Oral BID     haloperidol  10 mg Oral TID      levothyroxine  88 mcg Oral Daily     memantine  5 mg Oral Daily     salmeterol  1 puff Inhalation BID     venlafaxine  75 mg Oral Daily with breakfast

## 2021-02-22 PROCEDURE — 99231 SBSQ HOSP IP/OBS SF/LOW 25: CPT | Performed by: HOSPITALIST

## 2021-02-22 PROCEDURE — 250N000013 HC RX MED GY IP 250 OP 250 PS 637: Performed by: PSYCHIATRY & NEUROLOGY

## 2021-02-22 PROCEDURE — 250N000013 HC RX MED GY IP 250 OP 250 PS 637: Performed by: INTERNAL MEDICINE

## 2021-02-22 PROCEDURE — 120N000001 HC R&B MED SURG/OB

## 2021-02-22 PROCEDURE — 250N000013 HC RX MED GY IP 250 OP 250 PS 637: Performed by: HOSPITALIST

## 2021-02-22 RX ADMIN — SALMETEROL XINAFOATE 1 PUFF: 50 POWDER, METERED ORAL; RESPIRATORY (INHALATION) at 22:01

## 2021-02-22 RX ADMIN — HALOPERIDOL 10 MG: 5 TABLET ORAL at 21:58

## 2021-02-22 RX ADMIN — LEVOTHYROXINE SODIUM 88 MCG: 88 TABLET ORAL at 09:01

## 2021-02-22 RX ADMIN — ASPIRIN 81 MG: 81 TABLET, COATED ORAL at 09:01

## 2021-02-22 RX ADMIN — DOCUSATE SODIUM 100 MG: 100 CAPSULE, LIQUID FILLED ORAL at 09:01

## 2021-02-22 RX ADMIN — HALOPERIDOL 10 MG: 5 TABLET ORAL at 09:01

## 2021-02-22 RX ADMIN — VENLAFAXINE HYDROCHLORIDE 75 MG: 75 CAPSULE, EXTENDED RELEASE ORAL at 09:01

## 2021-02-22 RX ADMIN — MEMANTINE 5 MG: 5 TABLET ORAL at 09:01

## 2021-02-22 RX ADMIN — ATORVASTATIN CALCIUM 80 MG: 40 TABLET, FILM COATED ORAL at 21:58

## 2021-02-22 RX ADMIN — Medication 0.25 MG: at 09:01

## 2021-02-22 RX ADMIN — HALOPERIDOL 10 MG: 5 TABLET ORAL at 15:26

## 2021-02-22 RX ADMIN — DOCUSATE SODIUM 100 MG: 100 CAPSULE, LIQUID FILLED ORAL at 21:58

## 2021-02-22 RX ADMIN — OLANZAPINE 5 MG: 5 TABLET, ORALLY DISINTEGRATING ORAL at 10:06

## 2021-02-22 RX ADMIN — Medication 0.25 MG: at 21:59

## 2021-02-22 RX ADMIN — SALMETEROL XINAFOATE 1 PUFF: 50 POWDER, METERED ORAL; RESPIRATORY (INHALATION) at 09:03

## 2021-02-22 ASSESSMENT — ACTIVITIES OF DAILY LIVING (ADL)
ADLS_ACUITY_SCORE: 11

## 2021-02-22 NOTE — PLAN OF CARE
Alert to self only. Agitated at the beginning of the shift, prn Zyprexa given. VSS on RA. Regular diet tolerating well. Denies pain. Independent in room. Door alarm in place.

## 2021-02-22 NOTE — PROGRESS NOTES
[facility]    Medicine Progress Note - Hospitalist Service  Date of Admission:  9/9/2020    When I evaluated patient: 02/22/2021    Assessment & Plan   John Juárez is a 55 year old male with PMHx of schizophrenia, hx of TBI, alcohol use disorder, COPD, hyperlipidemia and hypothyroidism who was admitted on 9/9/2020 for evaluation of aggressive behavior at his group home.  He has been evaluated by Psychiatry and his medical condition remains stable, though his group home is now unwilling to take him back and thus hospitalization has been prolonged awaiting new placement. Under civil commitment through [facility]     Neurocognitive disorder dt hx of TBI and alcohol use disorder  Schizophrenia  Under commitment  Had been residing in group home prior to admission. Brought to hospital for evaluation of aggressive behaviors. Group home now unwilling to take him back. Has had numerous CODE 21s called this stay. Seen by psych, meds adjusted. Is currently under civil commitment and court hold through [facility] until 7/31/21. Earlier on in stay, psych had recommended geripsych placement but per evaluation on 12/18, no longer felt this was needed and recommended to continue current meds. QTc 428 on EKG on 12/18. Seen again by psych on 1/5/21 and again on 2/15, not felt to need inpatient psych stay.   -- On Haldol 10mg TID, memantine 5 mg daily, venlafaxine 75 mg daily, benztropine 0.25 mg BID  -- Haloperidol, olanzapine, and lorazepam PRN for acute agitation   *Had isolated episode of orthostatic hypotension after receiving olanzapine, but no recurrence   -- Door alarm in place due to high risk for elopement  -- SW following for placement     Back cellulitis, Resolved  Noted on 1/24, initiated on cephalexin at that time. Received local wound care and completed 7-day course of antibiotics on 1/30    Baseline Hypotension  BP 90s systolic on average. Asymptomatic. No  concerns     Hyperlipidemia  Chronic and stable on atorvastatin     COPD  Chronic and stable on salmeterol, albuterol prn     Hypothyroidism  Chronic and stable on levothyroxine     Tobacco use disorder  Using nicotine patch and PRN gum     Resting tremor of upper extremities ?TD due to medication  No acute issues   If worsens, will consider psychiatry consult     Asymptomatic COVID19 screening: negative on 2/21/2021     Diet: Room Service  Diet  Combination Diet Regular Diet Adult; Safe Tray - with utensils    DVT Prophylaxis: Pneumatic Compression Devices  Valentin Catheter: not present  Code Status: Full Code         Disposition: Expected discharge pending placement. Awaiting guardianship    Hermilo Sousa MD  Hospitalist Service  [facility]  Contact information available via [facility] Paging/Directory    ______________________________________________________________________    Interval History   No acute issues last 24 hours, remains calm, did not express any concern this am.     Data reviewed today: I reviewed all medications, new labs and imaging results over the last 24 hours. I personally reviewed no images or EKG's today.    Physical Exam   Vital Signs: Temp: 97.2  F (36.2  C) Temp src: Oral BP: 100/64 Pulse: 67   Resp: 16 SpO2: 98 % O2 Device: None (Room air)    Weight: 182 lbs 0 oz     Constitutional: Resting comfortably, NAD  Respiratory: Breathing non-labored.  Skin/Integument: No rash  Neuro: Alert, answers 'yes/no'. Was resting comfortably and did not noted tremor  Psych: Calm and cooperative    Data   No lab results found in last 7 days.      No results found for this or any previous visit (from the past 24 hour(s)).    Medications       aspirin  81 mg Oral Daily     atorvastatin  80 mg Oral At Bedtime     benztropine  0.25 mg Oral BID     docusate sodium  100 mg Oral BID     haloperidol  10 mg Oral TID     levothyroxine  88 mcg Oral Daily     memantine  5 mg Oral Daily      salmeterol  1 puff Inhalation BID     venlafaxine  75 mg Oral Daily with breakfast

## 2021-02-22 NOTE — PLAN OF CARE
Pt alert/oriented to self only. Calm/cooperative this shift. Up independently in room. Tolerating regular diet. Covid test yesterday-negative. Door alarm in place.

## 2021-02-22 NOTE — PLAN OF CARE
Alert to self only.  Calm and cooperative, flat affect.  PRN Zyprexa given this afternoon.  Soft BP, asymptomatic.  Tolerating regular diet.  Covid negative.  Up independently.  Door alarm in place.

## 2021-02-22 NOTE — PROGRESS NOTES
Care Management Follow Up    Length of Stay (days): 156    Expected Discharge Date: 03/01/21     Concerns to be Addressed: patient refuses services, discharge planning     Patient plan of care discussed at interdisciplinary rounds: Yes    Anticipated Discharge Disposition: Other (Comments)     Anticipated Discharge Services: None  Anticipated Discharge DME: None    Patient/family educated on Medicare website which has current facility and service quality ratings: (not applicable)  Education Provided on the Discharge Plan:    Patient/Family in Agreement with the Plan: no    Referrals Placed by CM/SW: Post Acute Facilities  Private pay costs discussed:     Additional Information:  Writer contacted sister Freida to confirm she has spoken with an  regarding the guardianship process and asked her when she can completed the information the  is requesting before filing the petition. Freida confirms she spoke with the , Temo Blair and that she will get the needed information to Mr Blair in the next 1`-2 days.  Plan:  Will follow up with Mr Blair in 2 days.       ASIM CastilloSW

## 2021-02-23 PROCEDURE — 250N000013 HC RX MED GY IP 250 OP 250 PS 637: Performed by: INTERNAL MEDICINE

## 2021-02-23 PROCEDURE — 120N000001 HC R&B MED SURG/OB

## 2021-02-23 PROCEDURE — 99231 SBSQ HOSP IP/OBS SF/LOW 25: CPT | Performed by: HOSPITALIST

## 2021-02-23 PROCEDURE — 250N000013 HC RX MED GY IP 250 OP 250 PS 637: Performed by: HOSPITALIST

## 2021-02-23 PROCEDURE — 99207 PR CDG-MDM COMPONENT: MEETS LOW - DOWN CODED: CPT | Performed by: HOSPITALIST

## 2021-02-23 PROCEDURE — 250N000013 HC RX MED GY IP 250 OP 250 PS 637: Performed by: PSYCHIATRY & NEUROLOGY

## 2021-02-23 RX ADMIN — SALMETEROL XINAFOATE 1 PUFF: 50 POWDER, METERED ORAL; RESPIRATORY (INHALATION) at 08:34

## 2021-02-23 RX ADMIN — ATORVASTATIN CALCIUM 80 MG: 40 TABLET, FILM COATED ORAL at 21:07

## 2021-02-23 RX ADMIN — MEMANTINE 5 MG: 5 TABLET ORAL at 08:34

## 2021-02-23 RX ADMIN — OLANZAPINE 10 MG: 5 TABLET, ORALLY DISINTEGRATING ORAL at 14:27

## 2021-02-23 RX ADMIN — Medication 0.25 MG: at 21:07

## 2021-02-23 RX ADMIN — SALMETEROL XINAFOATE 1 PUFF: 50 POWDER, METERED ORAL; RESPIRATORY (INHALATION) at 21:07

## 2021-02-23 RX ADMIN — HALOPERIDOL 10 MG: 5 TABLET ORAL at 15:36

## 2021-02-23 RX ADMIN — LEVOTHYROXINE SODIUM 88 MCG: 88 TABLET ORAL at 08:34

## 2021-02-23 RX ADMIN — ASPIRIN 81 MG: 81 TABLET, COATED ORAL at 08:33

## 2021-02-23 RX ADMIN — HALOPERIDOL 10 MG: 5 TABLET ORAL at 08:34

## 2021-02-23 RX ADMIN — Medication 0.25 MG: at 08:34

## 2021-02-23 RX ADMIN — HALOPERIDOL 10 MG: 5 TABLET ORAL at 21:07

## 2021-02-23 RX ADMIN — VENLAFAXINE HYDROCHLORIDE 75 MG: 75 CAPSULE, EXTENDED RELEASE ORAL at 08:33

## 2021-02-23 RX ADMIN — DOCUSATE SODIUM 100 MG: 100 CAPSULE, LIQUID FILLED ORAL at 21:07

## 2021-02-23 RX ADMIN — HALOPERIDOL 5 MG: 5 TABLET ORAL at 19:24

## 2021-02-23 RX ADMIN — DOCUSATE SODIUM 100 MG: 100 CAPSULE, LIQUID FILLED ORAL at 08:34

## 2021-02-23 ASSESSMENT — ACTIVITIES OF DAILY LIVING (ADL)
ADLS_ACUITY_SCORE: 11

## 2021-02-23 NOTE — PLAN OF CARE
Pt A/O to self. VSS on RA. Pt came out of room couple of times, redirection and PRN zyprexa given. Other than that, has been pleasant this shift, sleeping between cares. Discharge pending guardianship.

## 2021-02-24 PROCEDURE — 250N000013 HC RX MED GY IP 250 OP 250 PS 637: Performed by: NURSE PRACTITIONER

## 2021-02-24 PROCEDURE — 120N000001 HC R&B MED SURG/OB

## 2021-02-24 PROCEDURE — 250N000013 HC RX MED GY IP 250 OP 250 PS 637: Performed by: HOSPITALIST

## 2021-02-24 PROCEDURE — 250N000013 HC RX MED GY IP 250 OP 250 PS 637: Performed by: PSYCHIATRY & NEUROLOGY

## 2021-02-24 PROCEDURE — 250N000013 HC RX MED GY IP 250 OP 250 PS 637: Performed by: INTERNAL MEDICINE

## 2021-02-24 PROCEDURE — 99207 PR CDG-MDM COMPONENT: MEETS LOW - DOWN CODED: CPT | Performed by: HOSPITALIST

## 2021-02-24 PROCEDURE — 99232 SBSQ HOSP IP/OBS MODERATE 35: CPT | Performed by: HOSPITALIST

## 2021-02-24 RX ORDER — HALOPERIDOL 5 MG/1
10 TABLET ORAL 3 TIMES DAILY
Status: DISCONTINUED | OUTPATIENT
Start: 2021-02-24 | End: 2021-05-17

## 2021-02-24 RX ADMIN — LEVOTHYROXINE SODIUM 88 MCG: 88 TABLET ORAL at 08:23

## 2021-02-24 RX ADMIN — HALOPERIDOL 10 MG: 5 TABLET ORAL at 08:23

## 2021-02-24 RX ADMIN — Medication 0.25 MG: at 20:42

## 2021-02-24 RX ADMIN — ASPIRIN 81 MG: 81 TABLET, COATED ORAL at 08:23

## 2021-02-24 RX ADMIN — DOCUSATE SODIUM 100 MG: 100 CAPSULE, LIQUID FILLED ORAL at 20:42

## 2021-02-24 RX ADMIN — Medication 0.25 MG: at 08:23

## 2021-02-24 RX ADMIN — MEMANTINE 5 MG: 5 TABLET ORAL at 08:23

## 2021-02-24 RX ADMIN — HALOPERIDOL 10 MG: 5 TABLET ORAL at 14:57

## 2021-02-24 RX ADMIN — ATORVASTATIN CALCIUM 80 MG: 40 TABLET, FILM COATED ORAL at 20:42

## 2021-02-24 RX ADMIN — OLANZAPINE 10 MG: 5 TABLET, ORALLY DISINTEGRATING ORAL at 10:41

## 2021-02-24 RX ADMIN — HALOPERIDOL 10 MG: 5 TABLET ORAL at 20:41

## 2021-02-24 RX ADMIN — SALMETEROL XINAFOATE 1 PUFF: 50 POWDER, METERED ORAL; RESPIRATORY (INHALATION) at 08:27

## 2021-02-24 RX ADMIN — VENLAFAXINE HYDROCHLORIDE 75 MG: 75 CAPSULE, EXTENDED RELEASE ORAL at 08:23

## 2021-02-24 RX ADMIN — NICOTINE POLACRILEX 2 MG: 2 GUM, CHEWING ORAL at 10:39

## 2021-02-24 RX ADMIN — SALMETEROL XINAFOATE 1 PUFF: 50 POWDER, METERED ORAL; RESPIRATORY (INHALATION) at 20:41

## 2021-02-24 RX ADMIN — DOCUSATE SODIUM 100 MG: 100 CAPSULE, LIQUID FILLED ORAL at 08:23

## 2021-02-24 ASSESSMENT — ACTIVITIES OF DAILY LIVING (ADL)
ADLS_ACUITY_SCORE: 11

## 2021-02-24 NOTE — PROGRESS NOTES
[facility]    Medicine Progress Note - Hospitalist Service  Date of Admission:  9/9/2020    When I evaluated patient: 02/24/2021    Assessment & Plan   John Juárez is a 55 year old male with PMHx of schizophrenia, hx of TBI, alcohol use disorder, COPD, hyperlipidemia and hypothyroidism who was admitted on 9/9/2020 for evaluation of aggressive behavior at his group home.  He has been evaluated by Psychiatry and his medical condition remains stable, though his group home is now unwilling to take him back and thus hospitalization has been prolonged awaiting new placement. Under civil commitment through [facility]     Neurocognitive disorder dt hx of TBI and alcohol use disorder  Schizophrenia  Under commitment  Had been residing in group home prior to admission. Brought to hospital for evaluation of aggressive behaviors. Group home now unwilling to take him back. Has had numerous CODE 21s called this stay. Seen by psych, meds adjusted. Is currently under civil commitment and court hold through [facility] until 7/31/21. Earlier on in stay, psych had recommended geripsych placement but per evaluation on 12/18, no longer felt this was needed and recommended to continue current meds. QTc 428 on EKG on 12/18. Seen again by psych on 1/5/21 and again on 2/15, not felt to need inpatient psych stay.   -- On Haldol 10mg TID, memantine 5 mg daily, venlafaxine 75 mg daily, benztropine 0.25 mg BID  -- Haloperidol, olanzapine, and lorazepam PRN for acute agitation   *Had isolated episode of orthostatic hypotension after receiving olanzapine, but no recurrence   -- Door alarm in place due to high risk for elopement  -- SW following for placement  -Discussed with nursing who reports concerns of agitation generally between 10 and 2:00 in the afternoon.  At that time as needed Zyprexa or Haldol has been given.  Review of records indicate patient receives scheduled Haldol at 9-4-10.  Discussed  with both nursing and Pharmacy attempts to shift dosing of scheduled Haldol to 8-3-9 in attempts to cover the late morning reported agitation.  If the patient persists with agitation at midday will schedule Zyprexa.  Monitor.     Back cellulitis, Resolved  Noted on 1/24, initiated on cephalexin at that time. Received local wound care and completed 7-day course of antibiotics on 1/30    Baseline Hypotension  BP 90s systolic on average. Asymptomatic. No concerns     Hyperlipidemia  Chronic and stable on atorvastatin     COPD  Chronic and stable on salmeterol, albuterol prn     Hypothyroidism  Chronic and stable on levothyroxine     Tobacco use disorder  Using nicotine patch and PRN gum     Resting tremor of upper extremities ?TD due to medication  No acute issues   If worsens, will consider psychiatry consult     Asymptomatic COVID19 screening: negative on 2/21/2021     Diet: Room Service  Diet  Combination Diet Regular Diet Adult; Safe Tray - with utensils    DVT Prophylaxis: Pneumatic Compression Devices  Valentin Catheter: not present  Code Status: Full Code         Disposition: Expected discharge pending placement. Awaiting guardianship    Irasema Farooq MD  Hospitalist Service  [facility]  Contact information available via [facility] Paging/Directory    ______________________________________________________________________    Interval History   Limited history from patient, he was arousable and was responsive to questions with limited words and nodding.  No acute behavioral issues.    Data reviewed today: I reviewed all medications, new labs and imaging results over the last 24 hours.    Physical Exam   Vital Signs: Temp: 97.2  F (36.2  C) Temp src: Oral BP: 102/70 Pulse: 82   Resp: 18 SpO2: 98 % O2 Device: None (Room air)    Weight: 182 lbs 0 oz     Constitutional: NAD, arousable, calm, cooperative, could softly answer question.  Respiratory: Breathing non-labored.  Skin/Integument: No  rash  Neuro: Arousable.  Appears comfortable, moves all extremities, no tremor noted.  Psych: Affect calm.      I spent >35 minutes on the unit/floor in management of care today.  Over 50% of my time was spent Coordinating Care and plan with Nursing and Pharmacy regarding behavior concerns/agitation, management and surveillance.       Data   No lab results found in last 7 days.        Medications       aspirin  81 mg Oral Daily     atorvastatin  80 mg Oral At Bedtime     benztropine  0.25 mg Oral BID     docusate sodium  100 mg Oral BID     haloperidol  10 mg Oral TID     levothyroxine  88 mcg Oral Daily     memantine  5 mg Oral Daily     salmeterol  1 puff Inhalation BID     venlafaxine  75 mg Oral Daily with breakfast

## 2021-02-24 NOTE — PLAN OF CARE
Pt alert to self. VSS on RA/ Denies pain. Pt got agitated and had gotten to the elevators twice this shift. PRN zyprexa given. Pt wanting to go smoke, PRN nicotine gum given. Scheduled haldol times readjusted. Discharge pending guardianship.

## 2021-02-24 NOTE — PLAN OF CARE
Pt A&O to self right now. Tolerating regular diet. Independent in room. Door alarm active. Guardianship pending.

## 2021-02-24 NOTE — PLAN OF CARE
Restless and agitated this evening, insisting that he is going to the movies with friends and asking to leave, slamming door. PRN haldol given x1, effective. Discharge pending guardianship and placement.

## 2021-02-25 PROCEDURE — 250N000013 HC RX MED GY IP 250 OP 250 PS 637: Performed by: HOSPITALIST

## 2021-02-25 PROCEDURE — 99232 SBSQ HOSP IP/OBS MODERATE 35: CPT | Performed by: HOSPITALIST

## 2021-02-25 PROCEDURE — 120N000001 HC R&B MED SURG/OB

## 2021-02-25 PROCEDURE — 250N000013 HC RX MED GY IP 250 OP 250 PS 637: Performed by: INTERNAL MEDICINE

## 2021-02-25 RX ADMIN — LEVOTHYROXINE SODIUM 88 MCG: 88 TABLET ORAL at 09:17

## 2021-02-25 RX ADMIN — HALOPERIDOL 10 MG: 5 TABLET ORAL at 08:08

## 2021-02-25 RX ADMIN — HALOPERIDOL 10 MG: 5 TABLET ORAL at 20:17

## 2021-02-25 RX ADMIN — VENLAFAXINE HYDROCHLORIDE 75 MG: 75 CAPSULE, EXTENDED RELEASE ORAL at 08:09

## 2021-02-25 RX ADMIN — SALMETEROL XINAFOATE 1 PUFF: 50 POWDER, METERED ORAL; RESPIRATORY (INHALATION) at 08:08

## 2021-02-25 RX ADMIN — Medication 0.25 MG: at 20:17

## 2021-02-25 RX ADMIN — SALMETEROL XINAFOATE 1 PUFF: 50 POWDER, METERED ORAL; RESPIRATORY (INHALATION) at 20:16

## 2021-02-25 RX ADMIN — DOCUSATE SODIUM 100 MG: 100 CAPSULE, LIQUID FILLED ORAL at 08:09

## 2021-02-25 RX ADMIN — DOCUSATE SODIUM 100 MG: 100 CAPSULE, LIQUID FILLED ORAL at 20:17

## 2021-02-25 RX ADMIN — HALOPERIDOL 10 MG: 5 TABLET ORAL at 14:24

## 2021-02-25 RX ADMIN — ATORVASTATIN CALCIUM 80 MG: 40 TABLET, FILM COATED ORAL at 20:17

## 2021-02-25 RX ADMIN — MEMANTINE 5 MG: 5 TABLET ORAL at 08:09

## 2021-02-25 RX ADMIN — ASPIRIN 81 MG: 81 TABLET, COATED ORAL at 08:09

## 2021-02-25 RX ADMIN — OLANZAPINE 10 MG: 5 TABLET, ORALLY DISINTEGRATING ORAL at 17:40

## 2021-02-25 RX ADMIN — Medication 0.25 MG: at 08:09

## 2021-02-25 ASSESSMENT — ACTIVITIES OF DAILY LIVING (ADL)
ADLS_ACUITY_SCORE: 11

## 2021-02-25 NOTE — PLAN OF CARE
2330 to 0730 Pt agitated at during  shift and asking to go smoke, able to be redirected and cooperative rest of the night. Discharge pending guardianship

## 2021-02-25 NOTE — PLAN OF CARE
Pt agitated at beginning of shift and asking to go smoke, able to be redirected and cooperative rest of the night. Discharge pending guardianship.

## 2021-02-25 NOTE — PROGRESS NOTES
[facility]    Medicine Progress Note - Hospitalist Service  Date of Admission:  9/9/2020    When I evaluated patient: 02/25/2021    Assessment & Plan   John Juárez is a 55 year old male with PMHx of schizophrenia, hx of TBI, alcohol use disorder, COPD, hyperlipidemia and hypothyroidism who was admitted on 9/9/2020 for evaluation of aggressive behavior at his group home.  He has been evaluated by Psychiatry and his medical condition remains stable, though his group home is now unwilling to take him back and thus hospitalization has been prolonged awaiting new placement. Under civil commitment through [facility]     Neurocognitive disorder dt hx of TBI and alcohol use disorder  Schizophrenia  Under commitment  Had been residing in group home prior to admission. Brought to hospital for evaluation of aggressive behaviors. Group home now unwilling to take him back. Has had numerous CODE 21s called this stay. Seen by psych, meds adjusted. Is currently under civil commitment and court hold through [facility] until 7/31/21. Earlier on in stay, psych had recommended geripsych placement but per evaluation on 12/18, no longer felt this was needed and recommended to continue current meds. QTc 428 on EKG on 12/18. Seen again by psych on 1/5/21 and again on 2/15, not felt to need inpatient psych stay.   -- On Haldol 10mg TID, memantine 5 mg daily, venlafaxine 75 mg daily, benztropine 0.25 mg BID  -- Haloperidol, olanzapine, and lorazepam PRN for acute agitation   *Had isolated episode of orthostatic hypotension after receiving olanzapine, but no recurrence   -- Door alarm in place due to high risk for elopement  -- SW following for placement  -Discussed with nursing who reports concerns of agitation generally between 10 and 2:00 in the afternoon.  At that time as needed Zyprexa or Haldol has been given.  Review of records indicate patient receives scheduled Haldol at 9-4-10.  Discussed  with both nursing and Pharmacy attempts to shift dosing of scheduled Haldol to 8-3-9 in attempts to cover the late morning reported agitation.  If the patient persists with agitation at midday will schedule Zyprexa.  Monitor.  -Today patient much more alert, interactive on morning rounds.  Answered questions appropriately.  Nursing reports no acute behavioral issues.  We will continue current treatment/medication plans.  Patient has PRNZyprexa and Haldol if needed.     Back cellulitis, Resolved  Noted on 1/24, initiated on cephalexin at that time. Received local wound care and completed 7-day course of antibiotics on 1/30    Baseline Hypotension  BP 90s systolic on average. Asymptomatic. No concerns     Hyperlipidemia  Chronic and stable on atorvastatin     COPD  Chronic and stable on salmeterol, albuterol prn     Hypothyroidism  Chronic and stable on levothyroxine     Tobacco use disorder  Using nicotine patch and PRN gum     Resting tremor of upper extremities ?TD due to medication  No acute issues   If worsens, will consider psychiatry consult     Asymptomatic COVID19 screening: negative on 2/21/2021     Diet: Room Service  Diet  Combination Diet Regular Diet Adult; Safe Tray - with utensils    DVT Prophylaxis: Pneumatic Compression Devices  Valentin Catheter: not present  Code Status: Full Code         Disposition: Expected discharge pending placement. Awaiting guardianship    Irasema Farooq MD  Hospitalist Service  [facility]  Contact information available via [facility] Paging/Directory    ______________________________________________________________________    Interval History   More alert and interactive on morning encounters.  Answered questions appropriately.  Nursing reports no acute behavioral issues.        Data reviewed today: I reviewed all medications, new labs and imaging results over the last 24 hours.    Physical Exam   Vital Signs: Temp: 97.4  F (36.3  C) Temp src: Oral  BP: 94/63 Pulse: 71   Resp: 18 SpO2: 99 % O2 Device: None (Room air)    Weight: 182 lbs 0 oz     Constitutional:  NAD, more alert today, calm, cooperative    Respiratory: Breathing non-labored.  Skin/Integument: No rash  Neuro: Awake.   Appears comfortable, moves all extremities,  Psych: Affect calm.      Medications       aspirin  81 mg Oral Daily     atorvastatin  80 mg Oral At Bedtime     benztropine  0.25 mg Oral BID     docusate sodium  100 mg Oral BID     haloperidol  10 mg Oral TID     levothyroxine  88 mcg Oral Daily     memantine  5 mg Oral Daily     salmeterol  1 puff Inhalation BID     venlafaxine  75 mg Oral Daily with breakfast

## 2021-02-26 PROCEDURE — 250N000013 HC RX MED GY IP 250 OP 250 PS 637: Performed by: INTERNAL MEDICINE

## 2021-02-26 PROCEDURE — 250N000013 HC RX MED GY IP 250 OP 250 PS 637: Performed by: HOSPITALIST

## 2021-02-26 PROCEDURE — 99207 PR CDG-MDM COMPONENT: MEETS MODERATE - UP CODED: CPT | Performed by: HOSPITALIST

## 2021-02-26 PROCEDURE — 99232 SBSQ HOSP IP/OBS MODERATE 35: CPT | Performed by: HOSPITALIST

## 2021-02-26 PROCEDURE — 120N000001 HC R&B MED SURG/OB

## 2021-02-26 RX ADMIN — HALOPERIDOL 10 MG: 5 TABLET ORAL at 08:14

## 2021-02-26 RX ADMIN — ACETAMINOPHEN 650 MG: 325 TABLET, FILM COATED ORAL at 10:04

## 2021-02-26 RX ADMIN — ASPIRIN 81 MG: 81 TABLET, COATED ORAL at 08:15

## 2021-02-26 RX ADMIN — Medication 0.25 MG: at 08:14

## 2021-02-26 RX ADMIN — HALOPERIDOL 10 MG: 5 TABLET ORAL at 15:51

## 2021-02-26 RX ADMIN — DOCUSATE SODIUM 100 MG: 100 CAPSULE, LIQUID FILLED ORAL at 08:14

## 2021-02-26 RX ADMIN — DOCUSATE SODIUM 100 MG: 100 CAPSULE, LIQUID FILLED ORAL at 20:01

## 2021-02-26 RX ADMIN — SALMETEROL XINAFOATE 1 PUFF: 50 POWDER, METERED ORAL; RESPIRATORY (INHALATION) at 08:21

## 2021-02-26 RX ADMIN — SALMETEROL XINAFOATE 1 PUFF: 50 POWDER, METERED ORAL; RESPIRATORY (INHALATION) at 20:02

## 2021-02-26 RX ADMIN — MEMANTINE 5 MG: 5 TABLET ORAL at 08:14

## 2021-02-26 RX ADMIN — OLANZAPINE 10 MG: 5 TABLET, ORALLY DISINTEGRATING ORAL at 10:03

## 2021-02-26 RX ADMIN — Medication 0.25 MG: at 20:01

## 2021-02-26 RX ADMIN — LEVOTHYROXINE SODIUM 88 MCG: 88 TABLET ORAL at 08:14

## 2021-02-26 RX ADMIN — ATORVASTATIN CALCIUM 80 MG: 40 TABLET, FILM COATED ORAL at 20:01

## 2021-02-26 RX ADMIN — HALOPERIDOL 10 MG: 5 TABLET ORAL at 20:01

## 2021-02-26 RX ADMIN — VENLAFAXINE HYDROCHLORIDE 75 MG: 75 CAPSULE, EXTENDED RELEASE ORAL at 08:14

## 2021-02-26 ASSESSMENT — ACTIVITIES OF DAILY LIVING (ADL)
ADLS_ACUITY_SCORE: 11

## 2021-02-26 NOTE — PLAN OF CARE
Pt slept well overnight. No acute concerns. VS deferred per orders. Discharge remains pending guardianship.

## 2021-02-26 NOTE — PLAN OF CARE
Pt A/O, cooperative/pleasant, flat affect. Zxprexa x1 for agitation. Regular diet. Good appetite. Up IND for ambulation. Plan: discharge pending guardianship

## 2021-02-26 NOTE — PLAN OF CARE
Pt calm and redirectable this shift. VSS on RA. Denies pain. Up ind. Door alarm in place. PRN zyprexa given for frequently coming out of room. Discharge pending guardianship.

## 2021-02-26 NOTE — PROGRESS NOTES
[facility]    Medicine Progress Note - Hospitalist Service  Date of Admission:  9/9/2020    When I evaluated patient: 02/26/2021    Assessment & Plan   John Juárez is a 55 year old male with PMHx of schizophrenia, hx of TBI, alcohol use disorder, COPD, hyperlipidemia and hypothyroidism who was admitted on 9/9/2020 for evaluation of aggressive behavior at his group home.  He has been evaluated by Psychiatry and his medical condition remains stable, though his group home is now unwilling to take him back and thus hospitalization has been prolonged awaiting new placement. Under civil commitment through [facility]     Neurocognitive disorder dt hx of TBI and alcohol use disorder  Schizophrenia  Under commitment  Had been residing in group home prior to admission. Brought to hospital for evaluation of aggressive behaviors. Group home now unwilling to take him back. Has had numerous CODE 21s called this stay. Seen by psych, meds adjusted. Is currently under civil commitment and court hold through [facility] until 7/31/21. Earlier on in stay, psych had recommended geripsych placement but per evaluation on 12/18, no longer felt this was needed and recommended to continue current meds. Seen again by psych on 1/5/21 and again on 2/15, not felt to need inpatient psych stay.   -- On Haldol 10mg TID, memantine 5 mg daily, venlafaxine 75 mg daily, benztropine 0.25 mg BID  -- Haloperidol, olanzapine, and lorazepam PRN for acute agitation  -- Door alarm in place due to high risk for elopement  -- SW following for placement  -Discussed with nursing who reports concerns of agitation generally between 10 and 2:00 in the afternoon.  Improved after  shift dosing of scheduled Haldol to 8-3-9 in attempts to cover the late morning reported agitation.  If the patient persists with agitation at midday will schedule Zyprexa.  Monitor.  -Since shift in timing of scheduled Haldol patient appears much  more alert and interactive on morning encounters.  Nursing reports no acute behavioral issues.  Monitor.     Back cellulitis, Resolved  Noted on 1/24, initiated on cephalexin at that time. Received local wound care and completed 7-day course of antibiotics on 1/30    Baseline Hypotension  BP 90s systolic on average. Asymptomatic. No concerns     Hyperlipidemia  Chronic and stable on atorvastatin     COPD  Chronic and stable on salmeterol, albuterol prn     Hypothyroidism  Chronic and stable on levothyroxine     Tobacco use disorder  Using nicotine patch and PRN gum     Resting tremor of upper extremities ?TD due to medication  No acute issues   If worsens, will consider psychiatry consult     Asymptomatic COVID19 screening: negative on 2/21/2021     Diet: Room Service  Diet  Combination Diet Regular Diet Adult; Safe Tray - with utensils    DVT Prophylaxis: Pneumatic Compression Devices  Valentin Catheter: not present  Code Status: Full Code         Disposition: Expected discharge pending placement. Awaiting guardianship    Irasema Farooq MD  Hospitalist Service  [facility]  Contact information available via [facility] Paging/Directory    ______________________________________________________________________    Interval History   More alert and interactive on morning encounters.  Answers question appropriately.  Nursing reports no acute behavioral issues.    Data reviewed today: I reviewed all medications, new labs and imaging results over the last 24 hours.    Physical Exam   Vital Signs: Temp: 96.6  F (35.9  C) Temp src: Oral BP: 110/76 Pulse: 78   Resp: 16 SpO2: 98 % O2 Device: None (Room air)    Weight: 182 lbs 0 oz     Constitutional:   NAD, alert, calm, cooperative    Respiratory: Breathing non-labored.  Skin/Integument: No rash  Neuro: Awake.   Appears comfortable, moves all extremities,  Psych: Affect calm.      Medications       aspirin  81 mg Oral Daily     atorvastatin  80 mg Oral At  Bedtime     benztropine  0.25 mg Oral BID     docusate sodium  100 mg Oral BID     haloperidol  10 mg Oral TID     levothyroxine  88 mcg Oral Daily     memantine  5 mg Oral Daily     salmeterol  1 puff Inhalation BID     venlafaxine  75 mg Oral Daily with breakfast

## 2021-02-26 NOTE — PLAN OF CARE
[id number]. A&O to self only. Pt asks to leave/go outside to smoke periodically, but easily redirectable most of the time. Denies pain. Med compliant. Scar to back/chest, otherwise skin WDL. Plan: pending guardianship.

## 2021-02-27 PROCEDURE — 120N000001 HC R&B MED SURG/OB

## 2021-02-27 PROCEDURE — 250N000013 HC RX MED GY IP 250 OP 250 PS 637: Performed by: HOSPITALIST

## 2021-02-27 PROCEDURE — 250N000013 HC RX MED GY IP 250 OP 250 PS 637: Performed by: INTERNAL MEDICINE

## 2021-02-27 PROCEDURE — 99231 SBSQ HOSP IP/OBS SF/LOW 25: CPT | Performed by: STUDENT IN AN ORGANIZED HEALTH CARE EDUCATION/TRAINING PROGRAM

## 2021-02-27 RX ADMIN — MEMANTINE 5 MG: 5 TABLET ORAL at 08:57

## 2021-02-27 RX ADMIN — VENLAFAXINE HYDROCHLORIDE 75 MG: 75 CAPSULE, EXTENDED RELEASE ORAL at 08:57

## 2021-02-27 RX ADMIN — Medication 0.25 MG: at 20:45

## 2021-02-27 RX ADMIN — OLANZAPINE 10 MG: 5 TABLET, ORALLY DISINTEGRATING ORAL at 12:06

## 2021-02-27 RX ADMIN — ATORVASTATIN CALCIUM 80 MG: 40 TABLET, FILM COATED ORAL at 20:45

## 2021-02-27 RX ADMIN — LEVOTHYROXINE SODIUM 88 MCG: 88 TABLET ORAL at 08:57

## 2021-02-27 RX ADMIN — HALOPERIDOL 10 MG: 5 TABLET ORAL at 20:45

## 2021-02-27 RX ADMIN — ASPIRIN 81 MG: 81 TABLET, COATED ORAL at 08:57

## 2021-02-27 RX ADMIN — DOCUSATE SODIUM 100 MG: 100 CAPSULE, LIQUID FILLED ORAL at 20:45

## 2021-02-27 RX ADMIN — HALOPERIDOL 10 MG: 5 TABLET ORAL at 08:57

## 2021-02-27 RX ADMIN — SALMETEROL XINAFOATE 1 PUFF: 50 POWDER, METERED ORAL; RESPIRATORY (INHALATION) at 20:45

## 2021-02-27 RX ADMIN — Medication 0.25 MG: at 08:57

## 2021-02-27 RX ADMIN — HALOPERIDOL 10 MG: 5 TABLET ORAL at 16:28

## 2021-02-27 RX ADMIN — DOCUSATE SODIUM 100 MG: 100 CAPSULE, LIQUID FILLED ORAL at 08:57

## 2021-02-27 ASSESSMENT — ACTIVITIES OF DAILY LIVING (ADL)
ADLS_ACUITY_SCORE: 11
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
## 2021-02-27 NOTE — PLAN OF CARE
A&O to self only. Pt asks to leave at times, but easily redirectable. Denies pain. Med compliant. Scar to back/chest, otherwise skin WDL. Plan: pending guardianship.

## 2021-02-28 PROCEDURE — 250N000013 HC RX MED GY IP 250 OP 250 PS 637: Performed by: INTERNAL MEDICINE

## 2021-02-28 PROCEDURE — 120N000001 HC R&B MED SURG/OB

## 2021-02-28 PROCEDURE — 99231 SBSQ HOSP IP/OBS SF/LOW 25: CPT | Performed by: STUDENT IN AN ORGANIZED HEALTH CARE EDUCATION/TRAINING PROGRAM

## 2021-02-28 PROCEDURE — 99207 PR CDG-MDM COMPONENT: MEETS LOW - DOWN CODED: CPT | Performed by: STUDENT IN AN ORGANIZED HEALTH CARE EDUCATION/TRAINING PROGRAM

## 2021-02-28 PROCEDURE — 250N000013 HC RX MED GY IP 250 OP 250 PS 637: Performed by: HOSPITALIST

## 2021-02-28 RX ADMIN — DOCUSATE SODIUM 100 MG: 100 CAPSULE, LIQUID FILLED ORAL at 09:41

## 2021-02-28 RX ADMIN — Medication 0.25 MG: at 20:15

## 2021-02-28 RX ADMIN — HALOPERIDOL 10 MG: 5 TABLET ORAL at 09:41

## 2021-02-28 RX ADMIN — OLANZAPINE 10 MG: 5 TABLET, ORALLY DISINTEGRATING ORAL at 04:02

## 2021-02-28 RX ADMIN — VENLAFAXINE HYDROCHLORIDE 75 MG: 75 CAPSULE, EXTENDED RELEASE ORAL at 09:41

## 2021-02-28 RX ADMIN — SALMETEROL XINAFOATE 1 PUFF: 50 POWDER, METERED ORAL; RESPIRATORY (INHALATION) at 20:15

## 2021-02-28 RX ADMIN — SALMETEROL XINAFOATE 1 PUFF: 50 POWDER, METERED ORAL; RESPIRATORY (INHALATION) at 14:50

## 2021-02-28 RX ADMIN — HALOPERIDOL 10 MG: 5 TABLET ORAL at 20:15

## 2021-02-28 RX ADMIN — HALOPERIDOL 10 MG: 5 TABLET ORAL at 14:51

## 2021-02-28 RX ADMIN — DOCUSATE SODIUM 100 MG: 100 CAPSULE, LIQUID FILLED ORAL at 20:15

## 2021-02-28 RX ADMIN — ASPIRIN 81 MG: 81 TABLET, COATED ORAL at 09:41

## 2021-02-28 RX ADMIN — LEVOTHYROXINE SODIUM 88 MCG: 88 TABLET ORAL at 09:41

## 2021-02-28 RX ADMIN — ATORVASTATIN CALCIUM 80 MG: 40 TABLET, FILM COATED ORAL at 20:15

## 2021-02-28 RX ADMIN — MEMANTINE 5 MG: 5 TABLET ORAL at 09:41

## 2021-02-28 RX ADMIN — Medication 0.25 MG: at 09:41

## 2021-02-28 ASSESSMENT — ACTIVITIES OF DAILY LIVING (ADL)
ADLS_ACUITY_SCORE: 11
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
## 2021-02-28 NOTE — PLAN OF CARE
Pt oriented to self only. Flat, cooperative. Up IND in room. Regular diet with good appetite. Shower today. Plan: Discharge pending guardianship
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
## 2021-02-28 NOTE — PLAN OF CARE
"Pt alert to self, needs reminders of which hospital he's in. Asks to leave and states \"he was told his commitment was over 4 months ago\". Pt states the \"voices told me\". Note tongue rolling and auditory hallucinations. Pt is easily redirectable and compliant. Regular diet- great appetite. Up IND in room to use BR. Plan: Placement pending guardianship.    "

## 2021-03-01 PROCEDURE — 99232 SBSQ HOSP IP/OBS MODERATE 35: CPT | Performed by: INTERNAL MEDICINE

## 2021-03-01 PROCEDURE — 250N000013 HC RX MED GY IP 250 OP 250 PS 637: Performed by: INTERNAL MEDICINE

## 2021-03-01 PROCEDURE — 250N000013 HC RX MED GY IP 250 OP 250 PS 637: Performed by: HOSPITALIST

## 2021-03-01 PROCEDURE — 120N000001 HC R&B MED SURG/OB

## 2021-03-01 RX ORDER — BENZTROPINE MESYLATE 0.5 MG/1
0.5 TABLET ORAL 2 TIMES DAILY
Status: DISCONTINUED | OUTPATIENT
Start: 2021-03-01 | End: 2021-04-01

## 2021-03-01 RX ADMIN — VENLAFAXINE HYDROCHLORIDE 75 MG: 75 CAPSULE, EXTENDED RELEASE ORAL at 09:39

## 2021-03-01 RX ADMIN — DOCUSATE SODIUM 100 MG: 100 CAPSULE, LIQUID FILLED ORAL at 21:43

## 2021-03-01 RX ADMIN — SALMETEROL XINAFOATE 1 PUFF: 50 POWDER, METERED ORAL; RESPIRATORY (INHALATION) at 09:39

## 2021-03-01 RX ADMIN — DOCUSATE SODIUM 100 MG: 100 CAPSULE, LIQUID FILLED ORAL at 09:39

## 2021-03-01 RX ADMIN — LEVOTHYROXINE SODIUM 88 MCG: 88 TABLET ORAL at 09:39

## 2021-03-01 RX ADMIN — BENZTROPINE MESYLATE 0.5 MG: 0.5 TABLET ORAL at 21:43

## 2021-03-01 RX ADMIN — ASPIRIN 81 MG: 81 TABLET, COATED ORAL at 09:39

## 2021-03-01 RX ADMIN — Medication 0.25 MG: at 09:39

## 2021-03-01 RX ADMIN — HALOPERIDOL 10 MG: 5 TABLET ORAL at 14:22

## 2021-03-01 RX ADMIN — HALOPERIDOL 10 MG: 5 TABLET ORAL at 21:43

## 2021-03-01 RX ADMIN — SALMETEROL XINAFOATE 1 PUFF: 50 POWDER, METERED ORAL; RESPIRATORY (INHALATION) at 21:46

## 2021-03-01 RX ADMIN — MEMANTINE 5 MG: 5 TABLET ORAL at 09:39

## 2021-03-01 RX ADMIN — HALOPERIDOL 10 MG: 5 TABLET ORAL at 09:39

## 2021-03-01 RX ADMIN — ATORVASTATIN CALCIUM 80 MG: 40 TABLET, FILM COATED ORAL at 21:43

## 2021-03-01 RX ADMIN — OLANZAPINE 10 MG: 5 TABLET, ORALLY DISINTEGRATING ORAL at 19:37

## 2021-03-01 ASSESSMENT — ACTIVITIES OF DAILY LIVING (ADL)
ADLS_ACUITY_SCORE: 11

## 2021-03-01 NOTE — PLAN OF CARE
Patient is alert but oriented to self only. Door alarm present, up independently. Frequently asks when he can leave. Good appetite. Calm and easily redirectable. Tongue rolling continues. Plan pending guardianship status.
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
## 2021-03-01 NOTE — PLAN OF CARE
Oriented only to self, forgetful. Denies pain. Up ind in room. Reorientable, likes snacks. Discharge pending guardianship and placement. Slept well overnight.

## 2021-03-02 PROCEDURE — 250N000013 HC RX MED GY IP 250 OP 250 PS 637: Performed by: INTERNAL MEDICINE

## 2021-03-02 PROCEDURE — 120N000001 HC R&B MED SURG/OB

## 2021-03-02 PROCEDURE — 99232 SBSQ HOSP IP/OBS MODERATE 35: CPT | Performed by: INTERNAL MEDICINE

## 2021-03-02 PROCEDURE — 250N000013 HC RX MED GY IP 250 OP 250 PS 637: Performed by: HOSPITALIST

## 2021-03-02 RX ADMIN — DOCUSATE SODIUM 100 MG: 100 CAPSULE, LIQUID FILLED ORAL at 08:57

## 2021-03-02 RX ADMIN — ASPIRIN 81 MG: 81 TABLET, COATED ORAL at 08:57

## 2021-03-02 RX ADMIN — BENZTROPINE MESYLATE 0.5 MG: 0.5 TABLET ORAL at 22:51

## 2021-03-02 RX ADMIN — SALMETEROL XINAFOATE 1 PUFF: 50 POWDER, METERED ORAL; RESPIRATORY (INHALATION) at 08:57

## 2021-03-02 RX ADMIN — ATORVASTATIN CALCIUM 80 MG: 40 TABLET, FILM COATED ORAL at 22:51

## 2021-03-02 RX ADMIN — HALOPERIDOL 10 MG: 5 TABLET ORAL at 22:51

## 2021-03-02 RX ADMIN — SALMETEROL XINAFOATE 1 PUFF: 50 POWDER, METERED ORAL; RESPIRATORY (INHALATION) at 22:52

## 2021-03-02 RX ADMIN — HALOPERIDOL 10 MG: 5 TABLET ORAL at 15:41

## 2021-03-02 RX ADMIN — DOCUSATE SODIUM 100 MG: 100 CAPSULE, LIQUID FILLED ORAL at 22:51

## 2021-03-02 RX ADMIN — OLANZAPINE 10 MG: 5 TABLET, ORALLY DISINTEGRATING ORAL at 12:28

## 2021-03-02 RX ADMIN — VENLAFAXINE HYDROCHLORIDE 75 MG: 75 CAPSULE, EXTENDED RELEASE ORAL at 08:57

## 2021-03-02 RX ADMIN — LEVOTHYROXINE SODIUM 88 MCG: 88 TABLET ORAL at 08:57

## 2021-03-02 RX ADMIN — HALOPERIDOL 10 MG: 5 TABLET ORAL at 08:57

## 2021-03-02 RX ADMIN — BENZTROPINE MESYLATE 0.5 MG: 0.5 TABLET ORAL at 08:57

## 2021-03-02 RX ADMIN — MEMANTINE 5 MG: 5 TABLET ORAL at 08:57

## 2021-03-02 ASSESSMENT — ACTIVITIES OF DAILY LIVING (ADL)
ADLS_ACUITY_SCORE: 11

## 2021-03-02 NOTE — PLAN OF CARE
Oriented only to self, forgetful. Denies pain. Calm and cooperative, slept most of night. Up ind in room, door alarm active. Discharge pending guardianship and placement.

## 2021-03-02 NOTE — PLAN OF CARE
Patient is oriented to self, very forgetful but easily reorientable. Denies pain. Up independently. Discharge pending guardianship and placement. Door alarm active.

## 2021-03-02 NOTE — PROGRESS NOTES
Care Management Follow Up    Length of Stay (days): 164    Expected Discharge Date: 03/09/21     Concerns to be Addressed: patient refuses services, discharge planning     Patient plan of care discussed at interdisciplinary rounds: Yes    Anticipated Discharge Disposition: Other (Comments)     Anticipated Discharge Services: None  Anticipated Discharge DME: None    Patient/family educated on Medicare website which has current facility and service quality ratings: (not applicable)  Education Provided on the Discharge Plan:    Patient/Family in Agreement with the Plan: no    Referrals Placed by CM/SW: Post Acute Facilities  Private pay costs discussed: Not applicable    Additional Information:  Writer called sister today and left a message asking if she has provided the  the necessary information to proceed with filing for guardianship of John.  Also faxed clinical information to [email or web address] [phone] and left a message for the manager Emily at [phone].  This is the group home that had originally accepted patient and rescinded accepting patient until a guardian is in place.  Purpose of faxing information is to keep their manager Emily updated on patient's care needs and behavior.      ASIM CastilloSW

## 2021-03-02 NOTE — PLAN OF CARE
Only alert to self, forgetful, agitated/frustrated from [id number]. Gave PRN 10mg zyprexa. Door alarm on. Denies pain. Up independently. Discharge pending guardianship & placement.
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
## 2021-03-03 PROCEDURE — 250N000013 HC RX MED GY IP 250 OP 250 PS 637: Performed by: PSYCHIATRY & NEUROLOGY

## 2021-03-03 PROCEDURE — 250N000013 HC RX MED GY IP 250 OP 250 PS 637: Performed by: INTERNAL MEDICINE

## 2021-03-03 PROCEDURE — 99231 SBSQ HOSP IP/OBS SF/LOW 25: CPT | Performed by: INTERNAL MEDICINE

## 2021-03-03 PROCEDURE — 250N000013 HC RX MED GY IP 250 OP 250 PS 637: Performed by: NURSE PRACTITIONER

## 2021-03-03 PROCEDURE — 250N000013 HC RX MED GY IP 250 OP 250 PS 637: Performed by: HOSPITALIST

## 2021-03-03 PROCEDURE — 120N000001 HC R&B MED SURG/OB

## 2021-03-03 RX ADMIN — DOCUSATE SODIUM 100 MG: 100 CAPSULE, LIQUID FILLED ORAL at 20:48

## 2021-03-03 RX ADMIN — BENZTROPINE MESYLATE 0.5 MG: 0.5 TABLET ORAL at 09:21

## 2021-03-03 RX ADMIN — BENZTROPINE MESYLATE 0.5 MG: 0.5 TABLET ORAL at 20:48

## 2021-03-03 RX ADMIN — SALMETEROL XINAFOATE 1 PUFF: 50 POWDER, METERED ORAL; RESPIRATORY (INHALATION) at 09:21

## 2021-03-03 RX ADMIN — HALOPERIDOL 10 MG: 5 TABLET ORAL at 23:05

## 2021-03-03 RX ADMIN — HALOPERIDOL 10 MG: 5 TABLET ORAL at 09:20

## 2021-03-03 RX ADMIN — DOCUSATE SODIUM 100 MG: 100 CAPSULE, LIQUID FILLED ORAL at 09:21

## 2021-03-03 RX ADMIN — ATORVASTATIN CALCIUM 80 MG: 40 TABLET, FILM COATED ORAL at 20:48

## 2021-03-03 RX ADMIN — HALOPERIDOL 10 MG: 5 TABLET ORAL at 14:00

## 2021-03-03 RX ADMIN — MEMANTINE 5 MG: 5 TABLET ORAL at 09:21

## 2021-03-03 RX ADMIN — LORAZEPAM 1 MG: 1 TABLET ORAL at 18:20

## 2021-03-03 RX ADMIN — OLANZAPINE 10 MG: 5 TABLET, ORALLY DISINTEGRATING ORAL at 11:05

## 2021-03-03 RX ADMIN — NICOTINE POLACRILEX 2 MG: 2 GUM, CHEWING ORAL at 14:01

## 2021-03-03 RX ADMIN — NICOTINE POLACRILEX 2 MG: 2 GUM, CHEWING ORAL at 16:32

## 2021-03-03 RX ADMIN — ASPIRIN 81 MG: 81 TABLET, COATED ORAL at 09:20

## 2021-03-03 RX ADMIN — NICOTINE 1 PATCH: 7 PATCH, EXTENDED RELEASE TRANSDERMAL at 09:21

## 2021-03-03 RX ADMIN — LEVOTHYROXINE SODIUM 88 MCG: 88 TABLET ORAL at 09:20

## 2021-03-03 RX ADMIN — HALOPERIDOL 5 MG: 5 TABLET ORAL at 16:32

## 2021-03-03 RX ADMIN — VENLAFAXINE HYDROCHLORIDE 75 MG: 75 CAPSULE, EXTENDED RELEASE ORAL at 09:20

## 2021-03-03 ASSESSMENT — ACTIVITIES OF DAILY LIVING (ADL)
ADLS_ACUITY_SCORE: 11
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
## 2021-03-03 NOTE — PLAN OF CARE
Alert to self only - cooperative this shift. Denies pain. Regular diet, enjoys snacks. IND, door alarm in place. Discharge pending guardianship & placement.

## 2021-03-03 NOTE — PLAN OF CARE
Alert to self only. Soft BP, other VSS. Denies pain. Agitated and restless this shift, prn zyprexa x1, haldol x1 and ativan x1 given with some relief. Otherwise cooperative and redirectable throughout this shift. Regular diet, enjoys snacks. IND, door alarm in place. Discharge pending guardianship & placement.

## 2021-03-04 PROCEDURE — 250N000013 HC RX MED GY IP 250 OP 250 PS 637: Performed by: NURSE PRACTITIONER

## 2021-03-04 PROCEDURE — 250N000013 HC RX MED GY IP 250 OP 250 PS 637: Performed by: HOSPITALIST

## 2021-03-04 PROCEDURE — 250N000013 HC RX MED GY IP 250 OP 250 PS 637: Performed by: INTERNAL MEDICINE

## 2021-03-04 PROCEDURE — 99231 SBSQ HOSP IP/OBS SF/LOW 25: CPT | Performed by: INTERNAL MEDICINE

## 2021-03-04 PROCEDURE — 120N000001 HC R&B MED SURG/OB

## 2021-03-04 RX ADMIN — DOCUSATE SODIUM 100 MG: 100 CAPSULE, LIQUID FILLED ORAL at 20:47

## 2021-03-04 RX ADMIN — HALOPERIDOL 10 MG: 5 TABLET ORAL at 20:46

## 2021-03-04 RX ADMIN — SALMETEROL XINAFOATE 1 PUFF: 50 POWDER, METERED ORAL; RESPIRATORY (INHALATION) at 02:09

## 2021-03-04 RX ADMIN — SALMETEROL XINAFOATE 1 PUFF: 50 POWDER, METERED ORAL; RESPIRATORY (INHALATION) at 09:31

## 2021-03-04 RX ADMIN — MEMANTINE 5 MG: 5 TABLET ORAL at 09:35

## 2021-03-04 RX ADMIN — ATORVASTATIN CALCIUM 80 MG: 40 TABLET, FILM COATED ORAL at 20:46

## 2021-03-04 RX ADMIN — LEVOTHYROXINE SODIUM 88 MCG: 88 TABLET ORAL at 09:30

## 2021-03-04 RX ADMIN — BENZTROPINE MESYLATE 0.5 MG: 0.5 TABLET ORAL at 20:47

## 2021-03-04 RX ADMIN — BENZTROPINE MESYLATE 0.5 MG: 0.5 TABLET ORAL at 09:30

## 2021-03-04 RX ADMIN — DOCUSATE SODIUM 100 MG: 100 CAPSULE, LIQUID FILLED ORAL at 09:30

## 2021-03-04 RX ADMIN — HALOPERIDOL 10 MG: 5 TABLET ORAL at 15:03

## 2021-03-04 RX ADMIN — SALMETEROL XINAFOATE 1 PUFF: 50 POWDER, METERED ORAL; RESPIRATORY (INHALATION) at 20:50

## 2021-03-04 RX ADMIN — NICOTINE POLACRILEX 2 MG: 2 GUM, CHEWING ORAL at 13:38

## 2021-03-04 RX ADMIN — VENLAFAXINE HYDROCHLORIDE 75 MG: 75 CAPSULE, EXTENDED RELEASE ORAL at 09:30

## 2021-03-04 RX ADMIN — ASPIRIN 81 MG: 81 TABLET, COATED ORAL at 09:30

## 2021-03-04 RX ADMIN — HALOPERIDOL 10 MG: 5 TABLET ORAL at 09:30

## 2021-03-04 RX ADMIN — NICOTINE 1 PATCH: 7 PATCH, EXTENDED RELEASE TRANSDERMAL at 09:33

## 2021-03-04 ASSESSMENT — ACTIVITIES OF DAILY LIVING (ADL)
ADLS_ACUITY_SCORE: 11

## 2021-03-04 NOTE — PLAN OF CARE
am: Pt is A/O x self. Calm and cooperative this shift. VSS. Denies pain. Up independently, ambulating in room. Tolerating regular diet. Voiding adequately in BR. Plan to discharge pending guardianship/placement.

## 2021-03-04 NOTE — PLAN OF CARE
Alert to self only. VSS on RA.  Denies pain. Cooperative this evening for vital signs and medication. Cooperative and redirectable throughout this shift. Regular diet, enjoys snacks. IND, door alarm in place. Discharge pending guardianship & placement.

## 2021-03-04 NOTE — PLAN OF CARE
Oriented to self.  Cooperative but asked multiple times to have a cigarette.  Ambulated in room independently.  Nicotine patch on left deltoid.  Pt received call from  today indicating that pt's sister is unable to him in after discharge.   attempting to get pt a guardian.  Nicotine gum given x1.  Denied pain.  Door alarm on.  Discharge pending guardianship/placement.  Nursing will continue to monitor.

## 2021-03-04 NOTE — PROGRESS NOTES
[facility]    Hospitalist Progress Note    Assessment & Plan   John Juárez is a 55 year old male with PMHx of schizophrenia, hx of TBI, alcohol use disorder, COPD, hyperlipidemia and hypothyroidism who was admitted on 9/9/2020 for evaluation of aggressive behavior at his group home.      Was evaluated by Psychiatry and his condition stabilized, though his group home was unwilling to take him back and thus hospitalization has been prolonged awaiting new placement and guardianship. Under civil commitment through [facility]     Neurocognitive disorder dt hx of TBI and alcohol use disorder  Schizophrenia  Under commitment  Had been residing in group home prior to admission.  Brought to hospital for evaluation of aggressive behaviors. Group home now unwilling to take him back. Has had numerous CODE 21s called this stay. Seen by psych, meds adjusted. Is currently under civil commitment and court hold through [facility] until 7/31/21. Earlier on in stay, psych had recommended geripsych placement but per evaluation on 12/18, no longer felt this was needed and recommended to continue current meds. QTc 428 on EKG on 12/18. Seen again by psych on 1/5/21 and again on 2/15, not felt to need inpatient psych stay.   -- conts on Haldol 10mg TID (timed for when patient tends to be most agitated), memantine 5mg daily, venlafaxine 75mg daily, benztropine 0.5mg BID   -- prns available for agitation (including Haldol, Zyprexa and Ativan)  -- door alarm in place due to high risk for elopement  -- SW following for placement     Back cellulitis in 1/2020: Resolved  Noted on 1/24, initiated on cephalexin at that time. Received local wound care and completed 7-day course of antibiotics on 1/30     Baseline Hypotension  BP 90s systolic on average. Asymptomatic. No concerns     Hyperlipidemia  Chronic and stable on atorvastatin     COPD  Not O2 dependent. Chronic and stable on salmeterol, albuterol  prn     Hypothyroidism  Chronic and stable on levothyroxine     Tobacco use disorder  Using nicotine patch and PRN gum     Resting tremor of upper extremities  No acute issues      Asymptomatic COVID19 screening: negative on 12/6/20, 2/21/21    FEN: no IVFs, lytes stable, regular diet  DVT Prophylaxis: PCDs when in bed, encourage ambulation  Code Status: Full Code    Disposition: Discharge date unclear, pending commitment process. SW following.    Luba Ugarte Ammy    Interval History   Seen this morning. Feeling okay. No specific complaints. Asking if he will get to go home today. Ambulating around room without difficulty.     -Data reviewed today: I reviewed all new labs and imaging results over the last 24 hours. I personally reviewed no images or EKG's today.    Physical Exam   Temp: 97.3  F (36.3  C) Temp src: Oral BP: 105/69 Pulse: 84   Resp: 16 SpO2: 96 % O2 Device: None (Room air)    Vitals:    09/09/20 1754 09/25/20 0632 12/11/20 0700   Weight: 70 kg (154 lb 5.2 oz) 71 kg (156 lb 8 oz) 82.6 kg (182 lb)     Vital Signs with Ranges  Temp:  [97.2  F (36.2  C)-97.3  F (36.3  C)] 97.3  F (36.3  C)  Pulse:  [78-84] 84  Resp:  [16] 16  BP: (105-107)/(69-73) 105/69  SpO2:  [96 %-99 %] 96 %  I/O last 3 completed shifts:  In: 360 [P.O.:360]  Out: -     Constitutional: Resting comfortably, alert and answering basic questions appropriately but otherwise minimally conversant, NAD  Respiratory: CTAB, no wheeze/rales/rhonchi, no increased work of breathing  Cardiovascular: HRRR, no MGR, no LE edema  GI: S, NT, ND, +BS  Skin/Integumen: warm/dry  Other:      Medications       aspirin  81 mg Oral Daily     atorvastatin  80 mg Oral At Bedtime     benztropine  0.5 mg Oral BID     docusate sodium  100 mg Oral BID     haloperidol  10 mg Oral TID     levothyroxine  88 mcg Oral Daily     memantine  5 mg Oral Daily     salmeterol  1 puff Inhalation BID     venlafaxine  75 mg Oral Daily with breakfast       Data   No lab  results found in last 7 days.    No results found for this or any previous visit (from the past 24 hour(s)).

## 2021-03-05 PROCEDURE — 250N000013 HC RX MED GY IP 250 OP 250 PS 637: Performed by: INTERNAL MEDICINE

## 2021-03-05 PROCEDURE — 120N000001 HC R&B MED SURG/OB

## 2021-03-05 PROCEDURE — 250N000013 HC RX MED GY IP 250 OP 250 PS 637: Performed by: HOSPITALIST

## 2021-03-05 PROCEDURE — 250N000013 HC RX MED GY IP 250 OP 250 PS 637: Performed by: PSYCHIATRY & NEUROLOGY

## 2021-03-05 PROCEDURE — 99231 SBSQ HOSP IP/OBS SF/LOW 25: CPT | Performed by: INTERNAL MEDICINE

## 2021-03-05 RX ADMIN — HALOPERIDOL 5 MG: 5 TABLET ORAL at 10:20

## 2021-03-05 RX ADMIN — MEMANTINE 5 MG: 5 TABLET ORAL at 08:25

## 2021-03-05 RX ADMIN — HALOPERIDOL 10 MG: 5 TABLET ORAL at 20:39

## 2021-03-05 RX ADMIN — DOCUSATE SODIUM 100 MG: 100 CAPSULE, LIQUID FILLED ORAL at 08:25

## 2021-03-05 RX ADMIN — BENZTROPINE MESYLATE 0.5 MG: 0.5 TABLET ORAL at 20:39

## 2021-03-05 RX ADMIN — ATORVASTATIN CALCIUM 80 MG: 40 TABLET, FILM COATED ORAL at 20:39

## 2021-03-05 RX ADMIN — LEVOTHYROXINE SODIUM 88 MCG: 88 TABLET ORAL at 08:25

## 2021-03-05 RX ADMIN — LORAZEPAM 1 MG: 1 TABLET ORAL at 13:34

## 2021-03-05 RX ADMIN — NICOTINE 1 PATCH: 7 PATCH, EXTENDED RELEASE TRANSDERMAL at 08:29

## 2021-03-05 RX ADMIN — HALOPERIDOL 10 MG: 5 TABLET ORAL at 08:24

## 2021-03-05 RX ADMIN — SALMETEROL XINAFOATE 1 PUFF: 50 POWDER, METERED ORAL; RESPIRATORY (INHALATION) at 20:40

## 2021-03-05 RX ADMIN — BENZTROPINE MESYLATE 0.5 MG: 0.5 TABLET ORAL at 08:25

## 2021-03-05 RX ADMIN — SALMETEROL XINAFOATE 1 PUFF: 50 POWDER, METERED ORAL; RESPIRATORY (INHALATION) at 08:28

## 2021-03-05 RX ADMIN — VENLAFAXINE HYDROCHLORIDE 75 MG: 75 CAPSULE, EXTENDED RELEASE ORAL at 08:24

## 2021-03-05 RX ADMIN — OLANZAPINE 10 MG: 5 TABLET, ORALLY DISINTEGRATING ORAL at 02:24

## 2021-03-05 RX ADMIN — HALOPERIDOL 10 MG: 5 TABLET ORAL at 14:48

## 2021-03-05 RX ADMIN — DOCUSATE SODIUM 100 MG: 100 CAPSULE, LIQUID FILLED ORAL at 20:39

## 2021-03-05 RX ADMIN — ASPIRIN 81 MG: 81 TABLET, COATED ORAL at 08:24

## 2021-03-05 ASSESSMENT — ACTIVITIES OF DAILY LIVING (ADL)
ADLS_ACUITY_SCORE: 11

## 2021-03-05 NOTE — PROGRESS NOTES
[facility]    Hospitalist Progress Note    Assessment & Plan   John Juárez is a 55 year old male with PMHx of schizophrenia, hx of TBI, alcohol use disorder, COPD, hyperlipidemia and hypothyroidism who was admitted on 9/9/2020 for evaluation of aggressive behavior at his group home.      Was evaluated by Psychiatry and his condition stabilized, though his group home was unwilling to take him back and thus hospitalization has been prolonged awaiting new placement and guardianship. Under civil commitment through [facility]     Neurocognitive disorder dt hx of TBI and alcohol use disorder  Schizophrenia  Under commitment  Had been residing in group home prior to admission.  Brought to hospital for evaluation of aggressive behaviors. Group home now unwilling to take him back. Has had numerous CODE 21s called this stay. Seen by psych, meds adjusted. Is currently under civil commitment and court hold through [facility] until 7/31/21. Earlier on in stay, psych had recommended geripsych placement but per evaluation on 12/18, no longer felt this was needed and recommended to continue current meds. QTc 428 on EKG on 12/18. Seen again by psych on 1/5/21 and again on 2/15, not felt to need inpatient psych stay.   -- conts on Haldol 10mg TID (timed for when patient tends to be most agitated), memantine 5mg daily, venlafaxine 75mg daily, benztropine 0.5mg BID   -- prns available for agitation (including Haldol, Zyprexa and Ativan)  -- door alarm in place due to high risk for elopement  -- SW following for placement     Back cellulitis in 1/2020: Resolved  Noted on 1/24, initiated on cephalexin at that time. Received local wound care and completed 7-day course of antibiotics on 1/30     Baseline Hypotension  BP 90s systolic on average. Asymptomatic. No concerns     Hyperlipidemia  Chronic and stable on atorvastatin     COPD  Not O2 dependent. Chronic and stable on salmeterol, albuterol  prn     Hypothyroidism  Chronic and stable on levothyroxine     Tobacco use disorder  Using nicotine patch and PRN gum     Resting tremor of upper extremities  No acute issues      Asymptomatic COVID19 screening: negative on 12/6/20, 2/21/21    FEN: no IVFs, lytes stable, regular diet  DVT Prophylaxis: PCDs when in bed, encourage ambulation  Code Status: Full Code    Disposition: Discharge date unclear, pending commitment process. SW following.    Luba Ugarte Ammy    Interval History    Seen this afternoon. Resting comfortably in room, no specific complaints. Ambulating around room without difficulty.    -Data reviewed today: I reviewed all new labs and imaging results over the last 24 hours. I personally reviewed no images or EKG's today.    Physical Exam   Temp: 97  F (36.1  C) Temp src: Oral BP: 106/70 Pulse: 76   Resp: 16 SpO2: 97 % O2 Device: None (Room air)    Vitals:    09/09/20 1754 09/25/20 0632 12/11/20 0700   Weight: 70 kg (154 lb 5.2 oz) 71 kg (156 lb 8 oz) 82.6 kg (182 lb)     Vital Signs with Ranges  Temp:  [97  F (36.1  C)] 97  F (36.1  C)  Pulse:  [76-79] 76  Resp:  [16] 16  BP: (102-106)/(70) 106/70  SpO2:  [97 %] 97 %  No intake/output data recorded.    Constitutional: Resting comfortably, alert and answering basic questions appropriately but otherwise minimally conversant, NAD  Respiratory: CTAB, no wheeze/rales/rhonchi, no increased work of breathing  Cardiovascular: HRRR, no MGR, no LE edema  GI: S, NT, ND, +BS  Skin/Integumen: warm/dry  Other:      Medications       aspirin  81 mg Oral Daily     atorvastatin  80 mg Oral At Bedtime     benztropine  0.5 mg Oral BID     docusate sodium  100 mg Oral BID     haloperidol  10 mg Oral TID     levothyroxine  88 mcg Oral Daily     memantine  5 mg Oral Daily     salmeterol  1 puff Inhalation BID     venlafaxine  75 mg Oral Daily with breakfast       Data   No lab results found in last 7 days.    No results found for this or any previous visit (from  the past 24 hour(s)).

## 2021-03-05 NOTE — PLAN OF CARE
Oriented to self. Cooperative/Calm VSS on RA. Denies pain. Independent in room. Up at [place]. Slept the whole evening. Woke around 0200-came out wanting to go home. Went back to his room and called 911. Kept coming out mentioning a family member is being held hostage. Gave snacks from his stash. Zyprexa 10mg give x1- effective.  attempting to get pt a guardian. Door alarm on/in place.  Discharge pending guardianship/placement.  Nursing will continue to monitor.

## 2021-03-05 NOTE — PLAN OF CARE
Oriented to self, calm & cooperative. VSS RA. Easily redirectable, enjoys snacks/ Ambulated in room independently. Denied any pain.  Door alarm on.  Plan pending guardianship/placement per  note.

## 2021-03-06 LAB — LACTATE BLD-SCNC: 1.4 MMOL/L (ref 0.7–2)

## 2021-03-06 PROCEDURE — 250N000013 HC RX MED GY IP 250 OP 250 PS 637: Performed by: INTERNAL MEDICINE

## 2021-03-06 PROCEDURE — 36415 COLL VENOUS BLD VENIPUNCTURE: CPT | Performed by: INTERNAL MEDICINE

## 2021-03-06 PROCEDURE — 120N000001 HC R&B MED SURG/OB

## 2021-03-06 PROCEDURE — 83605 ASSAY OF LACTIC ACID: CPT | Performed by: INTERNAL MEDICINE

## 2021-03-06 PROCEDURE — 99231 SBSQ HOSP IP/OBS SF/LOW 25: CPT | Performed by: INTERNAL MEDICINE

## 2021-03-06 PROCEDURE — 250N000013 HC RX MED GY IP 250 OP 250 PS 637: Performed by: HOSPITALIST

## 2021-03-06 RX ADMIN — HALOPERIDOL 10 MG: 5 TABLET ORAL at 15:03

## 2021-03-06 RX ADMIN — HALOPERIDOL 10 MG: 5 TABLET ORAL at 20:37

## 2021-03-06 RX ADMIN — DOCUSATE SODIUM 100 MG: 100 CAPSULE, LIQUID FILLED ORAL at 20:37

## 2021-03-06 RX ADMIN — LEVOTHYROXINE SODIUM 88 MCG: 88 TABLET ORAL at 08:00

## 2021-03-06 RX ADMIN — SALMETEROL XINAFOATE 1 PUFF: 50 POWDER, METERED ORAL; RESPIRATORY (INHALATION) at 08:02

## 2021-03-06 RX ADMIN — MEMANTINE 5 MG: 5 TABLET ORAL at 08:00

## 2021-03-06 RX ADMIN — BENZTROPINE MESYLATE 0.5 MG: 0.5 TABLET ORAL at 20:36

## 2021-03-06 RX ADMIN — ASPIRIN 81 MG: 81 TABLET, COATED ORAL at 08:00

## 2021-03-06 RX ADMIN — VENLAFAXINE HYDROCHLORIDE 75 MG: 75 CAPSULE, EXTENDED RELEASE ORAL at 08:00

## 2021-03-06 RX ADMIN — BENZTROPINE MESYLATE 0.5 MG: 0.5 TABLET ORAL at 08:00

## 2021-03-06 RX ADMIN — ATORVASTATIN CALCIUM 80 MG: 40 TABLET, FILM COATED ORAL at 20:36

## 2021-03-06 RX ADMIN — HALOPERIDOL 10 MG: 5 TABLET ORAL at 08:00

## 2021-03-06 RX ADMIN — NICOTINE 1 PATCH: 7 PATCH, EXTENDED RELEASE TRANSDERMAL at 08:01

## 2021-03-06 RX ADMIN — DOCUSATE SODIUM 100 MG: 100 CAPSULE, LIQUID FILLED ORAL at 08:00

## 2021-03-06 RX ADMIN — SALMETEROL XINAFOATE 1 PUFF: 50 POWDER, METERED ORAL; RESPIRATORY (INHALATION) at 20:37

## 2021-03-06 ASSESSMENT — ACTIVITIES OF DAILY LIVING (ADL)
ADLS_ACUITY_SCORE: 11

## 2021-03-06 NOTE — PROGRESS NOTES
[facility]    Hospitalist Progress Note    Assessment & Plan   John Juárez is a 55 year old male with PMHx of schizophrenia, hx of TBI, alcohol use disorder, COPD, hyperlipidemia and hypothyroidism who was admitted on 9/9/2020 for evaluation of aggressive behavior at his group home.      Was evaluated by Psychiatry and his condition stabilized, though his group home was unwilling to take him back and thus hospitalization has been prolonged awaiting new placement and guardianship. Under civil commitment through [facility]     Neurocognitive disorder dt hx of TBI and alcohol use disorder  Schizophrenia  Under commitment  Had been residing in group home prior to admission.  Brought to hospital for evaluation of aggressive behaviors. Group home now unwilling to take him back. Has had numerous CODE 21s called this stay. Seen by psych, meds adjusted. Is currently under civil commitment and court hold through [facility] until 7/31/21. Earlier on in stay, psych had recommended geripsych placement but per evaluation on 12/18, no longer felt this was needed and recommended to continue current meds. QTc 428 on EKG on 12/18. Seen again by psych on 1/5/21 and again on 2/15, not felt to need inpatient psych stay.   -- conts on Haldol 10mg TID (timed for when patient tends to be most agitated), memantine 5mg daily, venlafaxine 75mg daily, benztropine 0.5mg BID   -- prns available for agitation (including Haldol, Zyprexa and Ativan)  -- door alarm in place due to high risk for elopement  -- SW following for placement     Back cellulitis in 1/2020: Resolved  Noted on 1/24, initiated on cephalexin at that time. Received local wound care and completed 7-day course of antibiotics on 1/30     Baseline Hypotension  BP 90s systolic on average. Asymptomatic. No concerns     Hyperlipidemia  Chronic and stable on atorvastatin     COPD  Not O2 dependent. Chronic and stable on salmeterol, albuterol  prn     Hypothyroidism  Chronic and stable on levothyroxine     Tobacco use disorder  Using nicotine patch and PRN gum     Resting tremor of upper extremities  No acute issues      Asymptomatic COVID19 screening: negative on 12/6/20, 2/21/21    FEN: no IVFs, lytes stable, regular diet  DVT Prophylaxis: PCDs when in bed, encourage ambulation  Code Status: Full Code    Disposition: Discharge date unclear, pending commitment process. SW following.    Luba Ugarte Ammy    Interval History    Uneventful night. Seen this morning. Resting comfortably. Did not wake during my visit. No concerns per RN.    -Data reviewed today: I reviewed all new labs and imaging results over the last 24 hours. I personally reviewed no images or EKG's today.    Physical Exam   Temp: 96.5  F (35.8  C) Temp src: Oral BP: 106/74 Pulse: 97   Resp: 16 SpO2: 95 % O2 Device: None (Room air)    Vitals:    09/09/20 1754 09/25/20 0632 12/11/20 0700   Weight: 70 kg (154 lb 5.2 oz) 71 kg (156 lb 8 oz) 82.6 kg (182 lb)     Vital Signs with Ranges  Temp:  [96.5  F (35.8  C)-96.7  F (35.9  C)] 96.5  F (35.8  C)  Pulse:  [83-97] 97  Resp:  [16] 16  BP: (100-106)/(69-74) 106/74  SpO2:  [95 %-97 %] 95 %  I/O last 3 completed shifts:  In: 240 [P.O.:240]  Out: -     Constitutional: Resting comfortably, NAD  Respiratory: breathing nonlabored  Cardiovascular: HRRR, ext perfused  GI: S, NT  Skin/Integumen: warm/dry  Other:      Medications       aspirin  81 mg Oral Daily     atorvastatin  80 mg Oral At Bedtime     benztropine  0.5 mg Oral BID     docusate sodium  100 mg Oral BID     haloperidol  10 mg Oral TID     levothyroxine  88 mcg Oral Daily     memantine  5 mg Oral Daily     salmeterol  1 puff Inhalation BID     venlafaxine  75 mg Oral Daily with breakfast       Data   No lab results found in last 7 days.    No results found for this or any previous visit (from the past 24 hour(s)).

## 2021-03-06 NOTE — PLAN OF CARE
Disoriented to time, situation. Forgetful. Mood labile. Denies pain. Up independently in room. Regular diet, good appetite. Continent. Discharge pending.

## 2021-03-06 NOTE — PLAN OF CARE
Alert to self. VSS on RA. Denies pain. Up ind in room, door alarm in place. PRN haldol x1, PRN ativan x1 given today during periods of agitation today - adamantly requesting to leave. Often redirectable with snacks/conversation. Regular diet, needs meals ordered - good appetite. Discharge pending guardianship process. SW following.

## 2021-03-07 PROCEDURE — 250N000013 HC RX MED GY IP 250 OP 250 PS 637: Performed by: HOSPITALIST

## 2021-03-07 PROCEDURE — 99231 SBSQ HOSP IP/OBS SF/LOW 25: CPT | Performed by: INTERNAL MEDICINE

## 2021-03-07 PROCEDURE — 250N000013 HC RX MED GY IP 250 OP 250 PS 637: Performed by: PSYCHIATRY & NEUROLOGY

## 2021-03-07 PROCEDURE — 120N000001 HC R&B MED SURG/OB

## 2021-03-07 PROCEDURE — 250N000013 HC RX MED GY IP 250 OP 250 PS 637: Performed by: INTERNAL MEDICINE

## 2021-03-07 PROCEDURE — 87635 SARS-COV-2 COVID-19 AMP PRB: CPT | Performed by: INTERNAL MEDICINE

## 2021-03-07 RX ADMIN — HALOPERIDOL 10 MG: 5 TABLET ORAL at 14:38

## 2021-03-07 RX ADMIN — SALMETEROL XINAFOATE 1 PUFF: 50 POWDER, METERED ORAL; RESPIRATORY (INHALATION) at 20:27

## 2021-03-07 RX ADMIN — NICOTINE 1 PATCH: 7 PATCH, EXTENDED RELEASE TRANSDERMAL at 21:11

## 2021-03-07 RX ADMIN — BENZTROPINE MESYLATE 0.5 MG: 0.5 TABLET ORAL at 20:27

## 2021-03-07 RX ADMIN — VENLAFAXINE HYDROCHLORIDE 75 MG: 75 CAPSULE, EXTENDED RELEASE ORAL at 08:46

## 2021-03-07 RX ADMIN — MEMANTINE 5 MG: 5 TABLET ORAL at 08:46

## 2021-03-07 RX ADMIN — LEVOTHYROXINE SODIUM 88 MCG: 88 TABLET ORAL at 08:46

## 2021-03-07 RX ADMIN — ATORVASTATIN CALCIUM 80 MG: 40 TABLET, FILM COATED ORAL at 20:27

## 2021-03-07 RX ADMIN — ASPIRIN 81 MG: 81 TABLET, COATED ORAL at 08:46

## 2021-03-07 RX ADMIN — HALOPERIDOL 10 MG: 5 TABLET ORAL at 20:27

## 2021-03-07 RX ADMIN — SALMETEROL XINAFOATE 1 PUFF: 50 POWDER, METERED ORAL; RESPIRATORY (INHALATION) at 08:47

## 2021-03-07 RX ADMIN — HALOPERIDOL 10 MG: 5 TABLET ORAL at 08:46

## 2021-03-07 RX ADMIN — LORAZEPAM 1 MG: 1 TABLET ORAL at 21:11

## 2021-03-07 RX ADMIN — BENZTROPINE MESYLATE 0.5 MG: 0.5 TABLET ORAL at 08:46

## 2021-03-07 RX ADMIN — DOCUSATE SODIUM 100 MG: 100 CAPSULE, LIQUID FILLED ORAL at 08:46

## 2021-03-07 RX ADMIN — NICOTINE 1 PATCH: 7 PATCH, EXTENDED RELEASE TRANSDERMAL at 08:49

## 2021-03-07 RX ADMIN — DOCUSATE SODIUM 100 MG: 100 CAPSULE, LIQUID FILLED ORAL at 20:27

## 2021-03-07 ASSESSMENT — ACTIVITIES OF DAILY LIVING (ADL)
ADLS_ACUITY_SCORE: 11

## 2021-03-07 NOTE — PLAN OF CARE
Disoriented to time/situation at times. VSS on RA. Denies pain. No PRNs needed today. Snacks given. Regular diet, good appetite. Ind in room. Door alarm in place. Discharge pending guardianship process.

## 2021-03-07 NOTE — PLAN OF CARE
Disoriented to time, situation. Calm and cooperative overnight, no PRNs needed. Regular diet, good appetite. Up independently in room. Denies pain. Plan pending guardianship. Door alarm in place.

## 2021-03-07 NOTE — PROGRESS NOTES
[facility]    Hospitalist Progress Note    Assessment & Plan   John Juárez is a 55 year old male with PMHx of schizophrenia, hx of TBI, alcohol use disorder, COPD, hyperlipidemia and hypothyroidism who was admitted on 9/9/2020 for evaluation of aggressive behavior at his group home.      Was evaluated by Psychiatry and his condition stabilized, though his group home was unwilling to take him back and thus hospitalization has been prolonged awaiting new placement and guardianship. Under civil commitment through [facility]     Neurocognitive disorder dt hx of TBI and alcohol use disorder  Schizophrenia  Under commitment  Had been residing in group home prior to admission.  Brought to hospital for evaluation of aggressive behaviors. Group home now unwilling to take him back. Has had numerous CODE 21s called this stay. Seen by psych, meds adjusted. Is currently under civil commitment and court hold through [facility] until 7/31/21. Earlier on in stay, psych had recommended geripsych placement but per evaluation on 12/18, no longer felt this was needed and recommended to continue current meds. QTc 428 on EKG on 12/18. Seen again by psych on 1/5/21 and again on 2/15, not felt to need inpatient psych stay.   -- conts on Haldol 10mg TID (timed for when patient tends to be most agitated), memantine 5mg daily, venlafaxine 75mg daily, benztropine 0.5mg BID   -- prns available for agitation (including Haldol, Zyprexa and Ativan)  -- door alarm in place due to high risk for elopement  -- SW following for placement     Back cellulitis in 1/2021: Resolved  Noted on 1/24, initiated on cephalexin at that time. Received local wound care and completed 7-day course of antibiotics on 1/30     Baseline Hypotension  BP 90s systolic on average. Asymptomatic. No concerns     Hyperlipidemia  Chronic and stable on atorvastatin     COPD  Not O2 dependent. Chronic and stable on salmeterol, albuterol  prn     Hypothyroidism  Chronic and stable on levothyroxine     Tobacco use disorder  Using nicotine patch and PRN gum     Resting tremor of upper extremities  No acute issues      Asymptomatic COVID19 screening: negative on 12/6/20, 2/21/21, 3/7/21    FEN: no IVFs, lytes stable, regular diet  DVT Prophylaxis: PCDs when in bed, encourage ambulation  Code Status: Full Code    Disposition: Discharge date unclear, pending commitment process. SW following.    Luba Gimenez    Interval History    Seen this morning. Sitting up in chair working on a puzzle. Resting comfortably. No complaints. No concerns per bedside RN.     -Data reviewed today: I reviewed all new labs and imaging results over the last 24 hours. I personally reviewed no images or EKG's today.    Physical Exam   Temp: 97.2  F (36.2  C) Temp src: Oral BP: 97/64 Pulse: 85   Resp: 16 SpO2: 98 % O2 Device: None (Room air)    Vitals:    09/09/20 1754 09/25/20 0632 12/11/20 0700   Weight: 70 kg (154 lb 5.2 oz) 71 kg (156 lb 8 oz) 82.6 kg (182 lb)     Vital Signs with Ranges  Temp:  [95.5  F (35.3  C)-97.2  F (36.2  C)] 97.2  F (36.2  C)  Pulse:  [85-90] 85  Resp:  [16] 16  BP: ()/(64-80) 97/64  SpO2:  [97 %-98 %] 98 %  I/O last 3 completed shifts:  In: 120 [P.O.:120]  Out: -     Constitutional: Resting comfortably, alert and answering basic questions appropriately, NAD  Respiratory: CTAB, no wheeze/rales/rhonchi, no increased work of breathing  Cardiovascular: HRRR, no MGR, no LE edema  GI: S, NT, ND, +BS  Skin/Integumen: warm/dry  Other:      Medications       aspirin  81 mg Oral Daily     atorvastatin  80 mg Oral At Bedtime     benztropine  0.5 mg Oral BID     docusate sodium  100 mg Oral BID     haloperidol  10 mg Oral TID     levothyroxine  88 mcg Oral Daily     memantine  5 mg Oral Daily     salmeterol  1 puff Inhalation BID     venlafaxine  75 mg Oral Daily with breakfast       Data   No lab results found in last 7 days.    No results found  for this or any previous visit (from the past 24 hour(s)).

## 2021-03-08 PROCEDURE — 250N000011 HC RX IP 250 OP 636: Performed by: NURSE PRACTITIONER

## 2021-03-08 PROCEDURE — 250N000013 HC RX MED GY IP 250 OP 250 PS 637: Performed by: HOSPITALIST

## 2021-03-08 PROCEDURE — 250N000009 HC RX 250

## 2021-03-08 PROCEDURE — 99232 SBSQ HOSP IP/OBS MODERATE 35: CPT | Performed by: HOSPITALIST

## 2021-03-08 PROCEDURE — 120N000001 HC R&B MED SURG/OB

## 2021-03-08 PROCEDURE — 250N000013 HC RX MED GY IP 250 OP 250 PS 637: Performed by: INTERNAL MEDICINE

## 2021-03-08 PROCEDURE — 250N000013 HC RX MED GY IP 250 OP 250 PS 637: Performed by: PSYCHIATRY & NEUROLOGY

## 2021-03-08 RX ORDER — WATER 10 ML/10ML
INJECTION INTRAMUSCULAR; INTRAVENOUS; SUBCUTANEOUS
Status: COMPLETED
Start: 2021-03-08 | End: 2021-03-08

## 2021-03-08 RX ADMIN — HALOPERIDOL 10 MG: 5 TABLET ORAL at 14:31

## 2021-03-08 RX ADMIN — BENZTROPINE MESYLATE 0.5 MG: 0.5 TABLET ORAL at 08:49

## 2021-03-08 RX ADMIN — HALOPERIDOL 10 MG: 5 TABLET ORAL at 21:01

## 2021-03-08 RX ADMIN — BENZTROPINE MESYLATE 0.5 MG: 0.5 TABLET ORAL at 21:01

## 2021-03-08 RX ADMIN — MELATONIN TAB 3 MG 3 MG: 3 TAB at 21:01

## 2021-03-08 RX ADMIN — ASPIRIN 81 MG: 81 TABLET, COATED ORAL at 08:49

## 2021-03-08 RX ADMIN — OLANZAPINE 5 MG: 5 TABLET, ORALLY DISINTEGRATING ORAL at 14:31

## 2021-03-08 RX ADMIN — DOCUSATE SODIUM 100 MG: 100 CAPSULE, LIQUID FILLED ORAL at 08:49

## 2021-03-08 RX ADMIN — OLANZAPINE 10 MG: 10 INJECTION, POWDER, FOR SOLUTION INTRAMUSCULAR at 17:19

## 2021-03-08 RX ADMIN — SALMETEROL XINAFOATE 1 PUFF: 50 POWDER, METERED ORAL; RESPIRATORY (INHALATION) at 08:50

## 2021-03-08 RX ADMIN — WATER 2.1 ML: 1 INJECTION INTRAMUSCULAR; INTRAVENOUS; SUBCUTANEOUS at 17:20

## 2021-03-08 RX ADMIN — MEMANTINE 5 MG: 5 TABLET ORAL at 08:49

## 2021-03-08 RX ADMIN — DOCUSATE SODIUM 100 MG: 100 CAPSULE, LIQUID FILLED ORAL at 21:01

## 2021-03-08 RX ADMIN — HALOPERIDOL 10 MG: 5 TABLET ORAL at 08:49

## 2021-03-08 RX ADMIN — VENLAFAXINE HYDROCHLORIDE 75 MG: 75 CAPSULE, EXTENDED RELEASE ORAL at 08:49

## 2021-03-08 RX ADMIN — ATORVASTATIN CALCIUM 80 MG: 40 TABLET, FILM COATED ORAL at 21:01

## 2021-03-08 RX ADMIN — SALMETEROL XINAFOATE 1 PUFF: 50 POWDER, METERED ORAL; RESPIRATORY (INHALATION) at 21:02

## 2021-03-08 RX ADMIN — LEVOTHYROXINE SODIUM 88 MCG: 88 TABLET ORAL at 08:49

## 2021-03-08 RX ADMIN — LORAZEPAM 1 MG: 1 TABLET ORAL at 17:01

## 2021-03-08 ASSESSMENT — ACTIVITIES OF DAILY LIVING (ADL)
ADLS_ACUITY_SCORE: 11

## 2021-03-08 NOTE — PROGRESS NOTES
[facility]    Medicine Progress Note - Hospitalist Service       Date of Admission:  9/9/2020  Assessment & Plan        John Juárez is a 55 year old male with PMHx of schizophrenia, hx of TBI, alcohol use disorder, COPD, hyperlipidemia and hypothyroidism who was admitted on 9/9/2020 for evaluation of aggressive behavior at his group home.       Was evaluated by Psychiatry and his condition stabilized, though his group home was unwilling to take him back and thus hospitalization has been prolonged awaiting new placement and guardianship. Under civil commitment through [facility]     Neurocognitive disorder dt hx of TBI and alcohol use disorder  Schizophrenia  Under commitment  Had been residing in group home prior to admission.  Brought to hospital for evaluation of aggressive behaviors. Group home now unwilling to take him back. Has had numerous CODE 21s called this stay. Seen by psych, meds adjusted. Is currently under civil commitment and court hold through [facility] until 7/31/21. Earlier on in stay, psych had recommended geripsych placement but per evaluation on 12/18, no longer felt this was needed and recommended to continue current meds. QTc 428 on EKG on 12/18. Seen again by psych on 1/5/21 and again on 2/15, not felt to need inpatient psych stay.   - Continues on Haldol 10mg TID (timed for when patient tends to be most agitated), memantine 5mg daily, venlafaxine 75mg daily, benztropine 0.5mg BID   - prns available for agitation (including Haldol, Zyprexa and Ativan)  - Door alarm in place due to high risk for elopement  - SW following for placement     Back cellulitis in 1/2021: Resolved  Noted on 1/24, initiated on cephalexin at that time. Received local wound care and completed 7-day course of antibiotics on 1/30     Baseline Hypotension  BP 90s systolic on average. Asymptomatic. No concerns     Hyperlipidemia  Chronic and stable on atorvastatin     COPD  Not O2  "dependent. Chronic and stable on salmeterol, albuterol prn     Hypothyroidism  Chronic and stable on levothyroxine     Tobacco use disorder  Using nicotine patch and PRN gum     Resting tremor of upper extremities  No acute issues      Asymptomatic COVID19 screening: negative on 12/6/20, 2/21/21, 3/7/21      Diet: Room Service  Diet  Combination Diet Regular Diet Adult; Safe Tray - with utensils    DVT Prophylaxis: Pneumatic Compression Devices  Valentin Catheter: not present  Code Status: Full Code           Disposition Plan   Expected discharge: pending commitment and placement  Entered: Sanjay Cantu MD 03/08/2021, 3:01 PM       The patient's care was discussed with the Bedside Nurse and Patient.    Sanjay Cantu MD  Hospitalist Service  [facility]  Contact information available via [facility] Paging/Directory    ______________________________________________________________________    Interval History   No acute events. Discussed with RN.  Up in chair and calm and cooperative. Has started on a puzzle \"Dough the Pug.\"  Denies pain, fevers, chills, or sob.     Data reviewed today: I reviewed all medications, new labs and imaging results over the last 24 hours. I personally reviewed no images or EKG's today.    Physical Exam   Vital Signs: Temp: 96.4  F (35.8  C) Temp src: Oral BP: 106/77 Pulse: 84   Resp: 16 SpO2: 97 % O2 Device: None (Room air)    Weight: 182 lbs 0 oz    Gen: NAD, pleasant  HEENT: Normocephalic, EOMI, MMM  Resp: no crackles,  no wheezes, no increased work of resp  CV: S1S2 heard, reg rhythm, reg rate, no pedal edema  Abdo: soft, nontender, nondistended, bowel sounds present  Ext: calves nontender, well perfused  Neuro: AAOx self, hospital, CN grossly intact, no facial asymmetry  No focal deficits noted. Moving all extremities.     Data   No lab results found in last 7 days.    No results found for this or any previous visit (from the past 24 hour(s)).  "

## 2021-03-08 NOTE — PLAN OF CARE
Disoriented to time/situation. Calm/cooperative today, no PRNs needed. Ind in room, door alarm in place. Regular diet, good appetite. Needs meals ordered. Snacks providedd. VSS on RA. Denies pain. Puzzle/reading material/cards provided. Covid swab done (negative) per new weekly policy. Discharge pending guardianship.

## 2021-03-08 NOTE — PLAN OF CARE
Disoriented to time, situation. Behavior labile, irritable and agitated at times, given PRN ativan x1. Denies pain. Nicotine patch to L shoulder. Up independently in room. Regular diet, good appetite. Discharge pending guardianship, placement. Door alarm in place.

## 2021-03-08 NOTE — CODE/RAPID RESPONSE
[facility]    RRT Note:  CODE 21  3/8/2021   Time Called: 5: 03 PM    RRT called for: Code 21     Assessment & Plan   Behavior disturbance secondary to neurocognitive disorder  Code 21 as patient attempted to elope. Upon my arrival patient was being escorted back from the [facility] by two- Security Officers. He returned to his room and received previously ordered PRN Zyprexa.     MAIA Guadalupe, CNP  Hospitalist Service, House Officer  [facility]     Text Page  Pager: [phone]

## 2021-03-09 PROCEDURE — 99232 SBSQ HOSP IP/OBS MODERATE 35: CPT | Performed by: HOSPITALIST

## 2021-03-09 PROCEDURE — 250N000013 HC RX MED GY IP 250 OP 250 PS 637: Performed by: HOSPITALIST

## 2021-03-09 PROCEDURE — 250N000013 HC RX MED GY IP 250 OP 250 PS 637: Performed by: INTERNAL MEDICINE

## 2021-03-09 PROCEDURE — 120N000001 HC R&B MED SURG/OB

## 2021-03-09 RX ADMIN — BENZTROPINE MESYLATE 0.5 MG: 0.5 TABLET ORAL at 20:01

## 2021-03-09 RX ADMIN — ATORVASTATIN CALCIUM 80 MG: 40 TABLET, FILM COATED ORAL at 20:01

## 2021-03-09 RX ADMIN — DOCUSATE SODIUM 100 MG: 100 CAPSULE, LIQUID FILLED ORAL at 08:35

## 2021-03-09 RX ADMIN — OLANZAPINE 10 MG: 5 TABLET, ORALLY DISINTEGRATING ORAL at 10:37

## 2021-03-09 RX ADMIN — HALOPERIDOL 5 MG: 5 TABLET ORAL at 03:55

## 2021-03-09 RX ADMIN — VENLAFAXINE HYDROCHLORIDE 75 MG: 75 CAPSULE, EXTENDED RELEASE ORAL at 08:35

## 2021-03-09 RX ADMIN — HALOPERIDOL 10 MG: 5 TABLET ORAL at 20:01

## 2021-03-09 RX ADMIN — HALOPERIDOL 10 MG: 5 TABLET ORAL at 15:11

## 2021-03-09 RX ADMIN — LEVOTHYROXINE SODIUM 88 MCG: 88 TABLET ORAL at 08:35

## 2021-03-09 RX ADMIN — DOCUSATE SODIUM 100 MG: 100 CAPSULE, LIQUID FILLED ORAL at 20:01

## 2021-03-09 RX ADMIN — ASPIRIN 81 MG: 81 TABLET, COATED ORAL at 08:36

## 2021-03-09 RX ADMIN — BENZTROPINE MESYLATE 0.5 MG: 0.5 TABLET ORAL at 08:35

## 2021-03-09 RX ADMIN — HALOPERIDOL 10 MG: 5 TABLET ORAL at 08:35

## 2021-03-09 RX ADMIN — MEMANTINE 5 MG: 5 TABLET ORAL at 08:35

## 2021-03-09 RX ADMIN — SALMETEROL XINAFOATE 1 PUFF: 50 POWDER, METERED ORAL; RESPIRATORY (INHALATION) at 08:36

## 2021-03-09 ASSESSMENT — ACTIVITIES OF DAILY LIVING (ADL)
ADLS_ACUITY_SCORE: 11

## 2021-03-09 NOTE — PLAN OF CARE
Pt is alert to self only. Haldol given x1 overnight for agitation. Daily VS. No IV access. Refused full head to toe assessment. Tolerating regular diet. Up independently in the room. Door alarm active. Discharge pending placement/guardianship.

## 2021-03-09 NOTE — PROGRESS NOTES
[facility]    Medicine Progress Note - Hospitalist Service       Date of Admission:  9/9/2020  Assessment & Plan       John Juárez is a 55 year old male with PMHx of schizophrenia, hx of TBI, alcohol use disorder, COPD, hyperlipidemia and hypothyroidism who was admitted on 9/9/2020 for evaluation of aggressive behavior at his group home.       Was evaluated by Psychiatry and his condition stabilized, though his group home was unwilling to take him back and thus hospitalization has been prolonged awaiting new placement and guardianship. Under civil commitment through [facility]     Neurocognitive disorder dt hx of TBI and alcohol use disorder  Schizophrenia  Under commitment  Had been residing in group home prior to admission.  Brought to hospital for evaluation of aggressive behaviors. Group home now unwilling to take him back. Has had numerous CODE 21s called this stay. Seen by psych, meds adjusted. Is currently under civil commitment and court hold through [facility] until 7/31/21. Earlier on in stay, psych had recommended geripsych placement but per evaluation on 12/18, no longer felt this was needed and recommended to continue current meds. QTc 428 on EKG on 12/18. Seen again by psych on 1/5/21 and again on 2/15, not felt to need inpatient psych stay.   - Continues on Haldol 10mg TID (timed for when patient tends to be most agitated), memantine 5mg daily, venlafaxine 75mg daily, benztropine 0.5mg BID   - prns available for agitation (including Haldol, Zyprexa and Ativan)  - Door alarm in place due to high risk for elopement (recent attempt 3/8 evening)  - SW following for placement     Back cellulitis in 1/2021: Resolved  Noted on 1/24, initiated on cephalexin at that time. Received local wound care and completed 7-day course of antibiotics on 1/30     Baseline Hypotension  BP 90s systolic on average. Asymptomatic. No concerns     Hyperlipidemia  Chronic and stable on  atorvastatin     COPD  Not O2 dependent. Chronic and stable on salmeterol, albuterol prn     Hypothyroidism  Chronic and stable on levothyroxine     Tobacco use disorder  Using nicotine patch and PRN gum     Resting tremor of upper extremities  No acute issues      Asymptomatic COVID19 screening: negative on 12/6/20, 2/21/21, 3/7/21      Diet: Room Service  Diet  Combination Diet Regular Diet Adult; Safe Tray - with utensils    DVT Prophylaxis: Pneumatic Compression Devices and Ambulate every shift  Valentin Catheter: not present  Code Status: Full Code           Disposition Plan   Expected discharge: pending placement - guardianship/commitment process ongoing  Entered: Sanjay Cantu MD 03/09/2021, 3:59 PM       The patient's care was discussed with the Bedside Nurse and Patient.    Sanjay Cantu MD  Hospitalist Service  [facility]  Contact information available via [facility] Paging/Directory    ______________________________________________________________________    Interval History   Patient had code 21 for an attempted elopement last evening.  Security officers escorted back to her room.  He received as needed Zyprexa.  Today he is calm and cooperative.  No shortness of breath or pain.  He is understandably asking when he will be able to leave.    Data reviewed today: I reviewed all medications, new labs and imaging results over the last 24 hours. I personally reviewed no images or EKG's today.    Physical Exam   Vital Signs: Temp: 96.9  F (36.1  C) Temp src: Oral BP: 101/69 Pulse: 81   Resp: 16 SpO2: 97 % O2 Device: None (Room air)    Weight: 182 lbs 0 oz    Gen: NAD, pleasant  HEENT: Normocephalic, EOMI, MMM  Resp: no crackles,  no wheezes, no increased work of resp  CV: S1S2 heard, reg rhythm, reg rate, no pedal edema  Abdo: soft, nontender, nondistended, bowel sounds present  Ext: calves nontender, well perfused  Neuro: AAOx self and hospital, CN grossly intact, no facial  asymmetry  Flat affect      Data   No lab results found in last 7 days.    No results found for this or any previous visit (from the past 24 hour(s)).

## 2021-03-09 NOTE — PLAN OF CARE
Pt is A and O to self only.  Irritable, uncooperative, and agitated today.  Scheduled Haldol and PRN oral Zyprexa  given earlier in the day, minimally effective.  Code 21 was utilize as pt became more aggressive this evening despite  multiple prn  and scheduled psych meds.  IM Zyprexa give during code 21 as pt escaped the floor.  VSS on RA. Denies pain. Refused head to toe assessments today. Regular diet, good appetite. Ind in the room. Ambulated in cruz multiple times. Discharge pending placement and guardianship. Door alarm on. Continue to monitor.

## 2021-03-09 NOTE — PLAN OF CARE
Alert, VSS on RA, confused, cooperative,easily redirectable, Zyprexa x 1 for anxiety/agitation, with good appetite, independent in room, discharge TBD pending placement.

## 2021-03-10 PROCEDURE — 99232 SBSQ HOSP IP/OBS MODERATE 35: CPT | Performed by: HOSPITALIST

## 2021-03-10 PROCEDURE — 250N000013 HC RX MED GY IP 250 OP 250 PS 637: Performed by: PSYCHIATRY & NEUROLOGY

## 2021-03-10 PROCEDURE — 250N000013 HC RX MED GY IP 250 OP 250 PS 637: Performed by: INTERNAL MEDICINE

## 2021-03-10 PROCEDURE — 250N000013 HC RX MED GY IP 250 OP 250 PS 637: Performed by: HOSPITALIST

## 2021-03-10 PROCEDURE — 120N000001 HC R&B MED SURG/OB

## 2021-03-10 RX ADMIN — OLANZAPINE 10 MG: 5 TABLET, ORALLY DISINTEGRATING ORAL at 01:25

## 2021-03-10 RX ADMIN — VENLAFAXINE HYDROCHLORIDE 75 MG: 75 CAPSULE, EXTENDED RELEASE ORAL at 08:13

## 2021-03-10 RX ADMIN — DOCUSATE SODIUM 100 MG: 100 CAPSULE, LIQUID FILLED ORAL at 08:13

## 2021-03-10 RX ADMIN — HALOPERIDOL 10 MG: 5 TABLET ORAL at 22:23

## 2021-03-10 RX ADMIN — LORAZEPAM 1 MG: 1 TABLET ORAL at 11:51

## 2021-03-10 RX ADMIN — HALOPERIDOL 10 MG: 5 TABLET ORAL at 08:13

## 2021-03-10 RX ADMIN — HALOPERIDOL 10 MG: 5 TABLET ORAL at 14:37

## 2021-03-10 RX ADMIN — ASPIRIN 81 MG: 81 TABLET, COATED ORAL at 08:13

## 2021-03-10 RX ADMIN — SALMETEROL XINAFOATE 1 PUFF: 50 POWDER, METERED ORAL; RESPIRATORY (INHALATION) at 22:24

## 2021-03-10 RX ADMIN — SALMETEROL XINAFOATE 1 PUFF: 50 POWDER, METERED ORAL; RESPIRATORY (INHALATION) at 08:14

## 2021-03-10 RX ADMIN — OLANZAPINE 10 MG: 5 TABLET, ORALLY DISINTEGRATING ORAL at 19:02

## 2021-03-10 RX ADMIN — MEMANTINE 5 MG: 5 TABLET ORAL at 08:13

## 2021-03-10 RX ADMIN — BENZTROPINE MESYLATE 0.5 MG: 0.5 TABLET ORAL at 22:23

## 2021-03-10 RX ADMIN — BENZTROPINE MESYLATE 0.5 MG: 0.5 TABLET ORAL at 08:13

## 2021-03-10 RX ADMIN — LEVOTHYROXINE SODIUM 88 MCG: 88 TABLET ORAL at 08:13

## 2021-03-10 RX ADMIN — ATORVASTATIN CALCIUM 80 MG: 40 TABLET, FILM COATED ORAL at 22:23

## 2021-03-10 RX ADMIN — DOCUSATE SODIUM 100 MG: 100 CAPSULE, LIQUID FILLED ORAL at 22:23

## 2021-03-10 ASSESSMENT — ACTIVITIES OF DAILY LIVING (ADL)
ADLS_ACUITY_SCORE: 11

## 2021-03-10 NOTE — PROGRESS NOTES
[facility]    Medicine Progress Note - Hospitalist Service       Date of Admission:  9/9/2020  Assessment & Plan       John Juárez is a 55 year old male with PMHx of schizophrenia, hx of TBI, alcohol use disorder, COPD, hyperlipidemia and hypothyroidism who was admitted on 9/9/2020 for evaluation of aggressive behavior at his group home.       Was evaluated by Psychiatry and his condition stabilized, though his group home was unwilling to take him back and thus hospitalization has been prolonged awaiting new placement and guardianship. Under civil commitment through [facility]     Neurocognitive disorder dt hx of TBI and alcohol use disorder  Schizophrenia  Under commitment  Had been residing in group home prior to admission.  Brought to hospital for evaluation of aggressive behaviors. Group home now unwilling to take him back. Has had numerous CODE 21s called this stay. Seen by psych, meds adjusted. Is currently under civil commitment and court hold through [facility] until 7/31/21. Earlier on in stay, psych had recommended geripsych placement but per evaluation on 12/18, no longer felt this was needed and recommended to continue current meds. QTc 428 on EKG on 12/18. Seen again by psych on 1/5/21 and again on 2/15, not felt to need inpatient psych stay.   - Continues on Haldol 10mg TID (timed for when patient tends to be most agitated), memantine 5mg daily, venlafaxine 75mg daily, benztropine 0.5mg BID   - prns available for agitation (including Haldol, Zyprexa and Ativan)  - Door alarm in place due to high risk for elopement (recent attempt 3/8 evening)  - SW following for placement     Back cellulitis in 1/2021: Resolved  Noted on 1/24, initiated on cephalexin at that time. Received local wound care and completed 7-day course of antibiotics on 1/30     Baseline Hypotension  BP 90s systolic on average. Asymptomatic. No concerns     Hyperlipidemia  Chronic and stable on  atorvastatin     COPD  Not O2 dependent. Chronic and stable on salmeterol, albuterol prn     Hypothyroidism  Chronic and stable on levothyroxine     Tobacco use disorder  Using nicotine patch and PRN gum     Resting tremor of upper extremities  No acute issues      Asymptomatic COVID19 screening: negative on 12/6/20, 2/21/21, 3/7/21      Diet: Room Service  Diet  Combination Diet Regular Diet Adult; Safe Tray - with utensils    DVT Prophylaxis: Pneumatic Compression Devices and Ambulate every shift  Valentin Catheter: not present  Code Status: Full Code           Disposition Plan   Expected discharge: pending placement, guardianship in process etc  Entered: Sanjay Cantu MD 03/10/2021, 9:34 AM       The patient's care was discussed with the Bedside Nurse and Patient.    Sanjay Cantu MD  Hospitalist Service  [facility]  Contact information available via [facility] Paging/Directory    ______________________________________________________________________    Interval History   Seen and examined this afternoon. No new issues. Resting comfortably. Denies sob or pain.    Data reviewed today: I reviewed all medications, new labs and imaging results over the last 24 hours. I personally reviewed no images or EKG's today.    Physical Exam   Vital Signs: Temp: 97.2  F (36.2  C) Temp src: Oral BP: (!) 89/50 Pulse: 80   Resp: 16 SpO2: 96 % O2 Device: None (Room air)    Weight: 182 lbs 0 oz    Gen: NAD, pleasant, calm  HEENT: Normocephalic, EOMI, MMM  Resp: no crackles,  no wheezes, no increased work of resp  CV: S1S2 heard, reg rhythm, reg rate, no pedal edema  Abdo: soft, nontender, nondistended, bowel sounds present  Ext: calves nontender, well perfused  Neuro: AAOx self, CN grossly intact, no facial asymmetry, moving all ext  Flat affect.      Data   No new labs

## 2021-03-10 NOTE — PLAN OF CARE
A/Ox1, self only. VSS on RA. Denies pain. Agitation, ativan given x1. Redirection and offering snacks somewhat effective. Regular diet, tolerating. Up ind in room. Door alarm on. No IV access. Discharge pending placement and guardianship.

## 2021-03-10 NOTE — PLAN OF CARE
"Oriented only to self, forgetful.Pt telling writer \"my ride is here and I need to get downstairs\", Prn Zyprexa x1 effective. Redirection and offering snacks somewhat effective. Refused full assessment. Tolerating diet. Up ind in room, door alarm active. No IV access. Discharge pending placement and guardianship. Slept between cares.     "

## 2021-03-10 NOTE — PLAN OF CARE
Pt alert to self only. Daily VS. No PRNs needed for agitation this evening. Pt required re-orientation to place, time, and situation. No IV access. Refused full head to toe assessment. Tolerating regular diet. Up independently in the room. Door alarm active. Discharge pending placement/guardianship.

## 2021-03-11 PROCEDURE — 250N000013 HC RX MED GY IP 250 OP 250 PS 637: Performed by: INTERNAL MEDICINE

## 2021-03-11 PROCEDURE — 250N000013 HC RX MED GY IP 250 OP 250 PS 637: Performed by: HOSPITALIST

## 2021-03-11 PROCEDURE — 250N000013 HC RX MED GY IP 250 OP 250 PS 637: Performed by: PSYCHIATRY & NEUROLOGY

## 2021-03-11 PROCEDURE — 120N000001 HC R&B MED SURG/OB

## 2021-03-11 PROCEDURE — 99231 SBSQ HOSP IP/OBS SF/LOW 25: CPT | Performed by: INTERNAL MEDICINE

## 2021-03-11 RX ADMIN — DOCUSATE SODIUM 100 MG: 100 CAPSULE, LIQUID FILLED ORAL at 08:41

## 2021-03-11 RX ADMIN — MEMANTINE 5 MG: 5 TABLET ORAL at 08:41

## 2021-03-11 RX ADMIN — HALOPERIDOL 10 MG: 5 TABLET ORAL at 14:16

## 2021-03-11 RX ADMIN — DOCUSATE SODIUM 100 MG: 100 CAPSULE, LIQUID FILLED ORAL at 21:02

## 2021-03-11 RX ADMIN — SALMETEROL XINAFOATE 1 PUFF: 50 POWDER, METERED ORAL; RESPIRATORY (INHALATION) at 21:02

## 2021-03-11 RX ADMIN — NICOTINE 1 PATCH: 7 PATCH, EXTENDED RELEASE TRANSDERMAL at 08:42

## 2021-03-11 RX ADMIN — LEVOTHYROXINE SODIUM 88 MCG: 88 TABLET ORAL at 08:41

## 2021-03-11 RX ADMIN — HALOPERIDOL 10 MG: 5 TABLET ORAL at 08:42

## 2021-03-11 RX ADMIN — LORAZEPAM 1 MG: 1 TABLET ORAL at 01:29

## 2021-03-11 RX ADMIN — BENZTROPINE MESYLATE 0.5 MG: 0.5 TABLET ORAL at 08:41

## 2021-03-11 RX ADMIN — ASPIRIN 81 MG: 81 TABLET, COATED ORAL at 08:41

## 2021-03-11 RX ADMIN — BENZTROPINE MESYLATE 0.5 MG: 0.5 TABLET ORAL at 21:02

## 2021-03-11 RX ADMIN — ATORVASTATIN CALCIUM 80 MG: 40 TABLET, FILM COATED ORAL at 21:02

## 2021-03-11 RX ADMIN — OLANZAPINE 10 MG: 5 TABLET, ORALLY DISINTEGRATING ORAL at 12:00

## 2021-03-11 RX ADMIN — VENLAFAXINE HYDROCHLORIDE 75 MG: 75 CAPSULE, EXTENDED RELEASE ORAL at 08:40

## 2021-03-11 RX ADMIN — HALOPERIDOL 10 MG: 5 TABLET ORAL at 21:02

## 2021-03-11 RX ADMIN — SALMETEROL XINAFOATE 1 PUFF: 50 POWDER, METERED ORAL; RESPIRATORY (INHALATION) at 08:54

## 2021-03-11 RX ADMIN — LORAZEPAM 1 MG: 1 TABLET ORAL at 10:58

## 2021-03-11 ASSESSMENT — ACTIVITIES OF DAILY LIVING (ADL)
ADLS_ACUITY_SCORE: 11

## 2021-03-11 NOTE — PLAN OF CARE
A&O to self only, confused and delusional. Pt appears responding at times, given PRN Ativan. Denies pain. No IV. Independent.

## 2021-03-11 NOTE — PROGRESS NOTES
[facility]    Medicine Progress Note - Hospitalist Service       Date of Admission:  9/9/2020  Assessment & Plan       55 year old male with PMHx of schizophrenia, hx of TBI, alcohol use disorder, COPD, hyperlipidemia and hypothyroidism who was admitted on 9/9/2020 for evaluation of aggressive behavior at his group home.        Neurocognitive disorder dt hx of TBI and alcohol use disorder  Schizophrenia  Under commitment  Had been residing in group home prior to admission.  Brought to hospital for evaluation of aggressive behaviors. Group home now unwilling to take him back. Has had numerous CODE 21s called this stay. Seen by psych, meds adjusted. Is currently under civil commitment and court hold through [facility] until 7/31/21. Earlier on in stay, psych had recommended geripsych placement but per evaluation on 12/18, no longer felt this was needed and recommended to continue current meds. QTc 428 on EKG on 12/18. Seen again by psych on 1/5/21 and again on 2/15, not felt to need inpatient psych stay.   - Continues on Haldol 10mg TID (timed for when patient tends to be most agitated), memantine 5mg daily, venlafaxine 75mg daily, benztropine 0.5mg BID   - prns available for agitation (including Haldol, Zyprexa and Ativan)  - Door alarm in place due to high risk for elopement (recent attempt 3/8 evening)  - SW following for placement     Back cellulitis in 1/2021: Resolved  Noted on 1/24, initiated on cephalexin at that time. Received local wound care and completed 7-day course of antibiotics on 1/30     Baseline Hypotension  BP 90s systolic on average. Asymptomatic. No concerns     Hyperlipidemia  Chronic and stable on atorvastatin     COPD  Not O2 dependent. Chronic and stable on salmeterol, albuterol prn     Hypothyroidism  Chronic and stable on levothyroxine     Tobacco use disorder  Using nicotine patch and PRN gum     Resting tremor of upper extremities  No acute issues       Asymptomatic COVID19 screening: negative on 12/6/20, 2/21/21, 3/7/21      Diet: Room Service  Diet  Combination Diet Regular Diet Adult; Safe Tray - with utensils    DVT Prophylaxis: Pneumatic Compression Devices and Ambulate every shift  Valentin Catheter: not present  Code Status: Full Code           Disposition Plan   Expected discharge: pending placement, guardianship in process etc    Deuce Turner MD  Hospitalist Service  [facility]  Contact information available via [facility] Paging/Directory    ______________________________________________________________________    Interval History   No complaints.     Physical Exam   Vital Signs: Temp: 95.6  F (35.3  C) Temp src: Oral BP: 106/67 Pulse: 73   Resp: 17 SpO2: 95 % O2 Device: None (Room air)    Weight: 182 lbs 0 oz    Gen: NAD, pleasant, calm  Resp: no crackles,  no wheezes, no increased work of resp  CV: S1S2 heard, reg rhythm, reg rate, no pedal edema  Abdo: soft, nontender, nondistended, bowel sounds present  Ext: calves nontender, well perfused  Neuro: AAOx self, CN grossly intact, no facial asymmetry, moving all ext  Flat affect.      Data   No new labs

## 2021-03-11 NOTE — PLAN OF CARE
Alert to self only. Door alarm on. PRN zyprexa given for agitation. VSS on RA. Denies pain. Up ind in room. Tolerating regular diet. No IV access. Discharge pending placement & guardianship.

## 2021-03-11 NOTE — PROGRESS NOTES
Care Management Follow Up    Length of Stay (days): 173    Expected Discharge Date: 03/15/21     Concerns to be Addressed: patient refuses services, discharge planning     Patient plan of care discussed at interdisciplinary rounds: Yes    Anticipated Discharge Disposition: Other (Comments)     Anticipated Discharge Services: None  Anticipated Discharge DME: None    Patient/family educated on Medicare website which has current facility and service quality ratings: (not applicable)  Education Provided on the Discharge Plan:    Patient/Family in Agreement with the Plan: no    Referrals Placed by CM/SW: Post Acute Facilities  Private pay costs discussed: Not applicable    Additional Information:  Today writer has been in contact Joshua Kuhn, Clinical Manager for [facility]. [phone]. Ms Haro has been periodically assessing patient.  Last week, writer had faxed to her updated  clinical information   Ms Haro is aware of patient's periods of agitation and elopment as recently as 3/8.  She believes she can minimize these episodes with distraction of activities at the facility and also because she can allow patient to smoke.    Today we spoke about the current barriers to admitting patient.  The first is financial.  We are waiting for the Representative Payee to be approved by Social Security. Once appointed,  the representative can dispense patient's monthly finances for cost of care.  Patient's behavioral  ([phone]) reports it is expected the payee process will be approved by late March.   The second barrier is that patient is non decisional and doesn't have a legal decision maker.  Ms Haro will consider accepting patient before the guardianship process is complete, if she knows the sister is willing to be the next of kin contact.  The sister is working with  Temo Blair regarding guardianship but has not committed to accept the role of guardian.    spoke with sister on 3/10 and plans to call her back on 3/12 or 3/15 to ask her for her final decision.  He is hoping she will accept the responsibility of guardianship as he reports it is difficult to find professional guardians.    Once the two barriers are closer to being resolved Ms Haro and a staff member will come to the hospital to complete an onsite assessment.    PLAN:  Writer will update progress note as progress is made.  Please contact writer at [phone] with any questions.     Lulu Contreras, ASIMSW

## 2021-03-12 PROCEDURE — 250N000013 HC RX MED GY IP 250 OP 250 PS 637: Performed by: HOSPITALIST

## 2021-03-12 PROCEDURE — 250N000013 HC RX MED GY IP 250 OP 250 PS 637: Performed by: INTERNAL MEDICINE

## 2021-03-12 PROCEDURE — 120N000001 HC R&B MED SURG/OB

## 2021-03-12 PROCEDURE — 99232 SBSQ HOSP IP/OBS MODERATE 35: CPT | Performed by: HOSPITALIST

## 2021-03-12 RX ADMIN — BENZTROPINE MESYLATE 0.5 MG: 0.5 TABLET ORAL at 21:16

## 2021-03-12 RX ADMIN — ATORVASTATIN CALCIUM 80 MG: 40 TABLET, FILM COATED ORAL at 21:16

## 2021-03-12 RX ADMIN — BENZTROPINE MESYLATE 0.5 MG: 0.5 TABLET ORAL at 09:00

## 2021-03-12 RX ADMIN — VENLAFAXINE HYDROCHLORIDE 75 MG: 75 CAPSULE, EXTENDED RELEASE ORAL at 09:00

## 2021-03-12 RX ADMIN — LEVOTHYROXINE SODIUM 88 MCG: 88 TABLET ORAL at 09:00

## 2021-03-12 RX ADMIN — DOCUSATE SODIUM 100 MG: 100 CAPSULE, LIQUID FILLED ORAL at 09:00

## 2021-03-12 RX ADMIN — DOCUSATE SODIUM 100 MG: 100 CAPSULE, LIQUID FILLED ORAL at 21:16

## 2021-03-12 RX ADMIN — HALOPERIDOL 10 MG: 5 TABLET ORAL at 14:15

## 2021-03-12 RX ADMIN — SALMETEROL XINAFOATE 1 PUFF: 50 POWDER, METERED ORAL; RESPIRATORY (INHALATION) at 21:16

## 2021-03-12 RX ADMIN — ASPIRIN 81 MG: 81 TABLET, COATED ORAL at 09:00

## 2021-03-12 RX ADMIN — HALOPERIDOL 10 MG: 5 TABLET ORAL at 09:00

## 2021-03-12 RX ADMIN — HALOPERIDOL 10 MG: 5 TABLET ORAL at 21:16

## 2021-03-12 RX ADMIN — SALMETEROL XINAFOATE 1 PUFF: 50 POWDER, METERED ORAL; RESPIRATORY (INHALATION) at 09:02

## 2021-03-12 RX ADMIN — OLANZAPINE 5 MG: 5 TABLET, ORALLY DISINTEGRATING ORAL at 16:15

## 2021-03-12 RX ADMIN — MEMANTINE 5 MG: 5 TABLET ORAL at 09:00

## 2021-03-12 ASSESSMENT — ACTIVITIES OF DAILY LIVING (ADL)
ADLS_ACUITY_SCORE: 11

## 2021-03-12 NOTE — PLAN OF CARE
[id number]: A/Ox4. VSS on RA. Denies pain. Up ind in room. Door alarm in place. Become restless/wanted to leave today, PRN ativan/PRN zyprexa given with good effect. Regular diet, good appetite. Meals ordered. Discharge pending guardianship/financial process completed - see SW note.

## 2021-03-12 NOTE — PLAN OF CARE
Disoriented to situation, time. Calm and cooperative overnight, no PRNs needed. Up independently in room. Denies pain. Regular diet, good appetite - needs assistance ordering. No IV access. SW following for discharge placement, see note.

## 2021-03-12 NOTE — CONSULTS
[facility] Psychiatric Consult Progress Note    Interval History:   Pt seen, chart reviewed, case discussed with nursing staff and treating clinicians. Patient maintains on regimen of HAldol 10 mg tid, memantine, venlafaxine, and benztropine with allowance of Haldol, Zyprexa, and Ativan PRNs. Refer to SW note (Lulu Contreras, LICSW 3/11/21) - they're in continued pursuit of navigating the logistics of getting him placement after a rather prolonged hospitalization of appx 174 days. Patient had an episode wherein he tried to elope on 3/8. He's had a handful of these episodes throughout his stay. Received PRN medication and calmed. On charting today he's be calm and cooperative and this has generally been the case save for these sporadic episodes of higher volatility. Remains disoriented to time and situation, which has been a consistent theme. His current regimen and the behavioral management efforts have been reasonably effective in mitigating more pronounced or sustained periods of dyscontrol.     We were unable to establish a telehealth connection. Nursing unsure of the issue other than his ID and PIN are no longer valid. We ended up doing a telephone visit. Patient voiced no complaints. No recollection of trying to leave the hospital, but also no expressions of concern or irritability at present.     Review of systems:   10 point Review of Systems completed by Ubaldo Ho DO, and is negative other than noted in the HPI     Medications:       aspirin  81 mg Oral Daily     atorvastatin  80 mg Oral At Bedtime     benztropine  0.5 mg Oral BID     docusate sodium  100 mg Oral BID     haloperidol  10 mg Oral TID     levothyroxine  88 mcg Oral Daily     memantine  5 mg Oral Daily     salmeterol  1 puff Inhalation BID     venlafaxine  75 mg Oral Daily with breakfast     acetaminophen, albuterol, alum & mag hydroxide-simethicone, haloperidol, LORazepam, melatonin, naloxone **OR** naloxone **OR**  naloxone **OR** naloxone, nicotine, nicotine, OLANZapine, OLANZapine zydis, ondansetron **OR** ondansetron, polyethylene glycol    Mental Status Examination:   Telephone visit due to technical issues with establishing telehealth connection. Calm but strained voice. No complaints. No recognition of recent events or the circumstances of his hospitalization, no insight. No expressions of SI/HI.        Labs/Vitals:   No results found for this or any previous visit (from the past 24 hour(s)).  B/P: 118/81, T: 95.9, P: 79, R: 16        Diagnoses:   1.  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  Other schizophrenia spectrum disorder  3.  Alcohol use disorder in remission  4.  Stimulant use disorder, in remission  5.  Chronic obstructive pulmonary disease  6.  Hypertension      Recommendations:   1. Maintain medications without modification  2. SW assisting in placement    Attestation:  Patient has been seen and evaluated by me,  Ubaldo Ho,     Visit/Communication Style   VIRTUAL (AUDIO) communication was used to evaluate John due to the COVID pandemic.    John consented to the use of audio communication: yes  Audio START time: 12:07 pm, 3/12/2021  Audio STOP time: 12:10 pm, 3/12/2021   Patient's location: [facility]   Provider's location during the visit: Home

## 2021-03-12 NOTE — PROGRESS NOTES
[facility]    Medicine Progress Note - Hospitalist Service       Date of Admission:  9/9/2020  Assessment & Plan       55 year old male with PMHx of schizophrenia, hx of TBI, alcohol use disorder, COPD, hyperlipidemia and hypothyroidism who was admitted on 9/9/2020 for evaluation of aggressive behavior at his group home.      Neurocognitive disorder dt hx of TBI and alcohol use disorder  Schizophrenia  Under commitment  Had been residing in group home prior to admission.  Brought to hospital for evaluation of aggressive behaviors. Group home now unwilling to take him back. Has had numerous CODE 21s called this stay. Seen by psych, meds adjusted. Is currently under civil commitment and court hold through [facility] until 7/31/21. Earlier on in stay, psych had recommended geripsych placement but per evaluation on 12/18, no longer felt this was needed and recommended to continue current meds. QTc 428 on EKG on 12/18. Seen again by psych on 1/5/21 and again on 2/15, not felt to need inpatient psych stay.   - Continues on Haldol 10mg TID (timed for when patient tends to be most agitated), memantine 5mg daily, venlafaxine 75mg daily, benztropine 0.5mg BID   - prns available for agitation (including Haldol, Zyprexa and Ativan)  - Door alarm in place due to high risk for elopement (recent attempt 3/8 evening)  - SW following for placement  - Psychiatry consulted 3/12 - appreciate follow up, no med changes for now     Back cellulitis in 1/2021: Resolved  Noted on 1/24, initiated on cephalexin at that time. Received local wound care and completed 7-day course of antibiotics on 1/30     Baseline Hypotension  BP 90s systolic on average. Asymptomatic. No concerns     Hyperlipidemia  Chronic and stable on atorvastatin     COPD  Not O2 dependent. Chronic and stable on salmeterol, albuterol prn     Hypothyroidism  Chronic and stable on levothyroxine     Tobacco use disorder  Using nicotine patch and  PRN gum     Resting tremor of upper extremities  No acute issues      Asymptomatic COVID19 screening: negative on 12/6/20, 2/21/21, 3/7/21         Diet: Room Service  Diet  Combination Diet Regular Diet Adult; Safe Tray - with utensils    DVT Prophylaxis: Pneumatic Compression Devices and Ambulate every shift  Valentin Catheter: not present  Code Status: Full Code           Disposition Plan   Expected discharge: Pending placement and guardianship process  Entered: Sanjay Cantu MD 03/12/2021, 7:40 AM       The patient's care was discussed with the Bedside Nurse and Patient.    Sanjay Cantu MD  Hospitalist Service  [facility]  Contact information available via [facility] Paging/Directory    ______________________________________________________________________    Interval History   No acute events overnight or this morning.  Denies pain shortness of breath.  Up in chair working on word search and Anup the [employer] puzzle.  Psych consulted for ongoing follow up - no changes.    Data reviewed today: I reviewed all medications, new labs and imaging results over the last 24 hours. I personally reviewed no images or EKG's today.    Physical Exam   Vital Signs: Temp: 96.4  F (35.8  C) Temp src: Oral BP: 111/79 Pulse: 81   Resp: 16 SpO2: 97 % O2 Device: None (Room air)    Weight: 182 lbs 0 oz    Gen: NAD, pleasant, calm  HEENT: Normocephalic, EOMI, MMM  Resp: no crackles,  no wheezes, no increased work of resp  CV: S1S2 heard, reg rhythm, reg rate, no pedal edema  Abdo: soft, nontender, nondistended, bowel sounds present  Ext: calves nontender, well perfused  Neuro: AAOx self, CN grossly intact, no facial asymmetry, moving all ext  Flat affect.    Data   No lab results found in last 7 days.    No results found for this or any previous visit (from the past 24 hour(s)).

## 2021-03-12 NOTE — PLAN OF CARE
A&O to self and place. VSS on RA. No IV access. Lungs clear. Bowel sounds active. Regular diet. Independent in room. Refused full skin assessment. Door alarm on for safety.

## 2021-03-13 PROCEDURE — 250N000013 HC RX MED GY IP 250 OP 250 PS 637: Performed by: INTERNAL MEDICINE

## 2021-03-13 PROCEDURE — 250N000013 HC RX MED GY IP 250 OP 250 PS 637: Performed by: HOSPITALIST

## 2021-03-13 PROCEDURE — 120N000001 HC R&B MED SURG/OB

## 2021-03-13 PROCEDURE — 99232 SBSQ HOSP IP/OBS MODERATE 35: CPT | Performed by: HOSPITALIST

## 2021-03-13 PROCEDURE — 250N000013 HC RX MED GY IP 250 OP 250 PS 637: Performed by: PSYCHIATRY & NEUROLOGY

## 2021-03-13 RX ADMIN — ATORVASTATIN CALCIUM 80 MG: 40 TABLET, FILM COATED ORAL at 21:24

## 2021-03-13 RX ADMIN — LORAZEPAM 1 MG: 1 TABLET ORAL at 13:12

## 2021-03-13 RX ADMIN — DOCUSATE SODIUM 100 MG: 100 CAPSULE, LIQUID FILLED ORAL at 21:24

## 2021-03-13 RX ADMIN — HALOPERIDOL 10 MG: 5 TABLET ORAL at 15:09

## 2021-03-13 RX ADMIN — MELATONIN TAB 3 MG 3 MG: 3 TAB at 22:02

## 2021-03-13 RX ADMIN — DOCUSATE SODIUM 100 MG: 100 CAPSULE, LIQUID FILLED ORAL at 07:44

## 2021-03-13 RX ADMIN — LORAZEPAM 1 MG: 1 TABLET ORAL at 22:02

## 2021-03-13 RX ADMIN — LEVOTHYROXINE SODIUM 88 MCG: 88 TABLET ORAL at 07:45

## 2021-03-13 RX ADMIN — HALOPERIDOL 10 MG: 5 TABLET ORAL at 07:44

## 2021-03-13 RX ADMIN — OLANZAPINE 5 MG: 5 TABLET, ORALLY DISINTEGRATING ORAL at 11:51

## 2021-03-13 RX ADMIN — SALMETEROL XINAFOATE 1 PUFF: 50 POWDER, METERED ORAL; RESPIRATORY (INHALATION) at 07:45

## 2021-03-13 RX ADMIN — ASPIRIN 81 MG: 81 TABLET, COATED ORAL at 07:45

## 2021-03-13 RX ADMIN — HALOPERIDOL 10 MG: 5 TABLET ORAL at 21:24

## 2021-03-13 RX ADMIN — MEMANTINE 5 MG: 5 TABLET ORAL at 07:45

## 2021-03-13 RX ADMIN — BENZTROPINE MESYLATE 0.5 MG: 0.5 TABLET ORAL at 07:44

## 2021-03-13 RX ADMIN — BENZTROPINE MESYLATE 0.5 MG: 0.5 TABLET ORAL at 21:24

## 2021-03-13 RX ADMIN — VENLAFAXINE HYDROCHLORIDE 75 MG: 75 CAPSULE, EXTENDED RELEASE ORAL at 07:45

## 2021-03-13 ASSESSMENT — ACTIVITIES OF DAILY LIVING (ADL)
ADLS_ACUITY_SCORE: 11

## 2021-03-13 NOTE — PLAN OF CARE
A&O to self.. VSS on RA. No IV access. Denies pain. Regular diet, good appetite. Independent in room.  He has been sleeping this shift. Door alarm on for elopement precaution

## 2021-03-13 NOTE — PLAN OF CARE
A&O to self. PRN Zyprexa 5 mg PO x1 this evening for agitation. VSS on RA. No IV access. Denies pain. Regular diet, good appetite. Independent in room. Pt refusing hygiene cares. Door alarm on for elopement precaution.

## 2021-03-14 PROCEDURE — 99232 SBSQ HOSP IP/OBS MODERATE 35: CPT | Performed by: HOSPITALIST

## 2021-03-14 PROCEDURE — 250N000013 HC RX MED GY IP 250 OP 250 PS 637: Performed by: INTERNAL MEDICINE

## 2021-03-14 PROCEDURE — 120N000001 HC R&B MED SURG/OB

## 2021-03-14 PROCEDURE — 250N000013 HC RX MED GY IP 250 OP 250 PS 637: Performed by: HOSPITALIST

## 2021-03-14 PROCEDURE — 250N000013 HC RX MED GY IP 250 OP 250 PS 637: Performed by: PSYCHIATRY & NEUROLOGY

## 2021-03-14 RX ADMIN — SALMETEROL XINAFOATE 1 PUFF: 50 POWDER, METERED ORAL; RESPIRATORY (INHALATION) at 08:41

## 2021-03-14 RX ADMIN — OLANZAPINE 5 MG: 5 TABLET, ORALLY DISINTEGRATING ORAL at 19:25

## 2021-03-14 RX ADMIN — MEMANTINE 5 MG: 5 TABLET ORAL at 08:41

## 2021-03-14 RX ADMIN — ASPIRIN 81 MG: 81 TABLET, COATED ORAL at 08:41

## 2021-03-14 RX ADMIN — DOCUSATE SODIUM 100 MG: 100 CAPSULE, LIQUID FILLED ORAL at 20:40

## 2021-03-14 RX ADMIN — LEVOTHYROXINE SODIUM 88 MCG: 88 TABLET ORAL at 08:40

## 2021-03-14 RX ADMIN — HALOPERIDOL 10 MG: 5 TABLET ORAL at 20:40

## 2021-03-14 RX ADMIN — BENZTROPINE MESYLATE 0.5 MG: 0.5 TABLET ORAL at 08:41

## 2021-03-14 RX ADMIN — VENLAFAXINE HYDROCHLORIDE 75 MG: 75 CAPSULE, EXTENDED RELEASE ORAL at 08:41

## 2021-03-14 RX ADMIN — HALOPERIDOL 10 MG: 5 TABLET ORAL at 15:10

## 2021-03-14 RX ADMIN — ATORVASTATIN CALCIUM 80 MG: 40 TABLET, FILM COATED ORAL at 20:40

## 2021-03-14 RX ADMIN — DOCUSATE SODIUM 100 MG: 100 CAPSULE, LIQUID FILLED ORAL at 08:41

## 2021-03-14 RX ADMIN — HALOPERIDOL 10 MG: 5 TABLET ORAL at 08:41

## 2021-03-14 RX ADMIN — BENZTROPINE MESYLATE 0.5 MG: 0.5 TABLET ORAL at 20:40

## 2021-03-14 RX ADMIN — LORAZEPAM 1 MG: 1 TABLET ORAL at 16:51

## 2021-03-14 ASSESSMENT — ACTIVITIES OF DAILY LIVING (ADL)
ADLS_ACUITY_SCORE: 11

## 2021-03-14 NOTE — PROVIDER NOTIFICATION
MD Notification    Notified Person: MD    Notified Person Name: Dr Cantu    Notification Date/Time: 3-14-21 9760    Notification Interaction: Spoke over phone    Purpose of Notification: Flagged for sepsis protocol; WBC is from 2-6-21. VSS are stable. Pt's BP is soft baseline.     Orders Received: No need to draw lactic acid at this time. If there is a clinical sign/concern, then ok to draw.    Comments:

## 2021-03-14 NOTE — PROGRESS NOTES
[facility]    Medicine Progress Note - Hospitalist Service       Date of Admission:  9/9/2020  Assessment & Plan       55 year old male with PMHx of schizophrenia, hx of TBI, alcohol use disorder, COPD, hyperlipidemia and hypothyroidism who was admitted on 9/9/2020 for evaluation of aggressive behavior at his group home.      Neurocognitive disorder dt hx of TBI and alcohol use disorder  Schizophrenia  Under commitment  Had been residing in group home prior to admission.  Brought to hospital for evaluation of aggressive behaviors. Group home now unwilling to take him back. Has had numerous CODE 21s called this stay. Seen by psych, meds adjusted. Is currently under civil commitment and court hold through [facility] until 7/31/21. Earlier on in stay, psych had recommended geripsych placement but per evaluation on 12/18, no longer felt this was needed and recommended to continue current meds. QTc 428 on EKG on 12/18. Seen again by psych on 1/5/21 and again on 2/15, not felt to need inpatient psych stay.   - Continues on Haldol 10mg TID (timed for when patient tends to be most agitated), memantine 5mg daily, venlafaxine 75mg daily, benztropine 0.5mg BID   - prns available for agitation (including Haldol, Zyprexa and Ativan)  - Door alarm in place due to high risk for elopement (recent attempt 3/8 evening)  - SW following for placement  - Psychiatry consulted 3/12 - appreciate follow up, no med changes for now     Back cellulitis in 1/2021: Resolved  Noted on 1/24, initiated on cephalexin at that time. Received local wound care and completed 7-day course of antibiotics on 1/30     Baseline Hypotension  BP 90s systolic on average. Asymptomatic. No concerns     Hyperlipidemia  Chronic and stable on atorvastatin     COPD  Not O2 dependent.   - Chronic and stable on salmeterol, albuterol prn     Hypothyroidism  Chronic and stable on levothyroxine     Tobacco use disorder  Using nicotine patch  and PRN gum     Resting tremor of upper extremities  No acute issues      Asymptomatic COVID19 screening  Negative on 12/6/20, 2/21/21, 3/7/21         Diet: Room Service  Diet  Combination Diet Regular Diet Adult; Safe Tray - with utensils    DVT Prophylaxis: Ambulate every shift  Valentin Catheter: not present  Code Status: Full Code           Disposition Plan   Expected discharge: pending placement, guardianship etc - appreciate SW ongoing efforts  Entered: Sanjay Cantu MD 03/14/2021, 8:19 AM       The patient's care was discussed with the Bedside Nurse and Patient.    Sanjay Cantu MD  Hospitalist Service  [facility]  Contact information available via [facility] Paging/Directory    ______________________________________________________________________    Interval History   No acute events. Calm and cooperative. Denies pain, fevers/chills, or SOB. Awaiting placement.     Data reviewed today: I reviewed all medications, new labs and imaging results over the last 24 hours. I personally reviewed no images or EKG's today.    Physical Exam   Vital Signs: T 96.7   HR 79   /68    RR 16/min    SpO2 96% on RA  Weight:     Gen: NAD, calm  HEENT: Normocephalic, EOMI, MMM  Resp: no crackles,  no wheezes, no increased work of resp  CV: S1S2 heard, reg rhythm, reg rate, no pedal edema  Abdo: soft, nontender, nondistended, bowel sounds present  Ext: calves nontender, well perfused  Neuro: awake and alert, moving all extremities, no facial asym noted    Data   No new labs

## 2021-03-14 NOTE — PLAN OF CARE
A&Ox1, alert only to self. Up independent. regular diet. Vitals only once per day. Denies pain. No IV, MD aware. Patient agitated gave ativan prn x1, resolved. Patient awaiting guardianship, discharge pending.

## 2021-03-14 NOTE — PLAN OF CARE
VSS. Disoriented x3. More agitated this afternoon, asking why he is still here. Tried to redirect as able to puzzle or TV. Provided reassurance. Prn ativan given x1. Good appetite. Up independently in room. Discharge plan pending placement and guardianship. Continue to monitor.

## 2021-03-14 NOTE — PLAN OF CARE
VSS. Prn zyprexa and ativan given for agitation, wanting to leave stating that his commitment has ended. Redirected to other activities as able. Up independently in room. Good appetite. No BM for a few days but declines Miralax, on colace. Plan pending guardianship and placement. Continue to monitor.

## 2021-03-15 PROCEDURE — 250N000013 HC RX MED GY IP 250 OP 250 PS 637: Performed by: HOSPITALIST

## 2021-03-15 PROCEDURE — U0003 INFECTIOUS AGENT DETECTION BY NUCLEIC ACID (DNA OR RNA); SEVERE ACUTE RESPIRATORY SYNDROME CORONAVIRUS 2 (SARS-COV-2) (CORONAVIRUS DISEASE [COVID-19]), AMPLIFIED PROBE TECHNIQUE, MAKING USE OF HIGH THROUGHPUT TECHNOLOGIES AS DESCRIBED BY CMS-2020-01-R: HCPCS | Performed by: INTERNAL MEDICINE

## 2021-03-15 PROCEDURE — U0005 INFEC AGEN DETEC AMPLI PROBE: HCPCS | Performed by: INTERNAL MEDICINE

## 2021-03-15 PROCEDURE — 120N000001 HC R&B MED SURG/OB

## 2021-03-15 PROCEDURE — 250N000013 HC RX MED GY IP 250 OP 250 PS 637: Performed by: INTERNAL MEDICINE

## 2021-03-15 PROCEDURE — 99231 SBSQ HOSP IP/OBS SF/LOW 25: CPT | Performed by: INTERNAL MEDICINE

## 2021-03-15 RX ADMIN — SALMETEROL XINAFOATE 1 PUFF: 50 POWDER, METERED ORAL; RESPIRATORY (INHALATION) at 20:04

## 2021-03-15 RX ADMIN — VENLAFAXINE HYDROCHLORIDE 75 MG: 75 CAPSULE, EXTENDED RELEASE ORAL at 09:26

## 2021-03-15 RX ADMIN — BENZTROPINE MESYLATE 0.5 MG: 0.5 TABLET ORAL at 09:26

## 2021-03-15 RX ADMIN — HALOPERIDOL 10 MG: 5 TABLET ORAL at 09:26

## 2021-03-15 RX ADMIN — HALOPERIDOL 10 MG: 5 TABLET ORAL at 20:03

## 2021-03-15 RX ADMIN — MEMANTINE 5 MG: 5 TABLET ORAL at 09:26

## 2021-03-15 RX ADMIN — ATORVASTATIN CALCIUM 80 MG: 40 TABLET, FILM COATED ORAL at 20:03

## 2021-03-15 RX ADMIN — DOCUSATE SODIUM 100 MG: 100 CAPSULE, LIQUID FILLED ORAL at 09:26

## 2021-03-15 RX ADMIN — LEVOTHYROXINE SODIUM 88 MCG: 88 TABLET ORAL at 09:26

## 2021-03-15 RX ADMIN — BENZTROPINE MESYLATE 0.5 MG: 0.5 TABLET ORAL at 20:03

## 2021-03-15 RX ADMIN — ASPIRIN 81 MG: 81 TABLET, COATED ORAL at 09:26

## 2021-03-15 RX ADMIN — HALOPERIDOL 10 MG: 5 TABLET ORAL at 15:06

## 2021-03-15 RX ADMIN — OLANZAPINE 5 MG: 5 TABLET, ORALLY DISINTEGRATING ORAL at 16:59

## 2021-03-15 RX ADMIN — DOCUSATE SODIUM 100 MG: 100 CAPSULE, LIQUID FILLED ORAL at 20:03

## 2021-03-15 ASSESSMENT — ACTIVITIES OF DAILY LIVING (ADL)
ADLS_ACUITY_SCORE: 11

## 2021-03-15 NOTE — PLAN OF CARE
A&Ox1, alert only to self. Up independent. regular diet. Vitals only once per day. Denies pain. No IV, MD aware. Patient agitated able to resolve with scheduled and reorientation. Patient awaiting guardianship, discharge pending.

## 2021-03-15 NOTE — PLAN OF CARE
Pt is AxOx1, VSS. Disoriented x3. More agitated this afternoon, asking why he is still here. Provided reassurance. Prn zyprea x1.  Good appetite. Up independently in room. Discharge plan pending placement and guardianship. Continue to monitor.

## 2021-03-15 NOTE — PROGRESS NOTES
[facility]    Medicine Progress Note - Hospitalist Service       Date of Admission:  9/9/2020  Assessment & Plan       55 year old male with PMHx of schizophrenia, hx of TBI, alcohol use disorder, COPD, hyperlipidemia and hypothyroidism who was admitted on 9/9/2020 for evaluation of aggressive behavior at his group home:      Neurocognitive disorder dt hx of TBI and alcohol use disorder  Schizophrenia  Under commitment  - Continues on Haldol 10mg TID (timed for when patient tends to be most agitated), memantine 5mg daily, venlafaxine 75mg daily, benztropine 0.5mg BID      Back cellulitis in 1/2021: Resolved  Noted on 1/24, initiated on cephalexin at that time. Received local wound care and completed 7-day course of antibiotics on 1/30     Baseline Hypotension  BP 90s systolic on average. Asymptomatic. No concerns     Hyperlipidemia  Chronic and stable on atorvastatin     COPD  - Chronic and stable on salmeterol, albuterol prn     Hypothyroidism -- stable on levothyroxine     Tobacco use disorder -- nicotine patch and PRN gum     Resting tremor of upper extremities  No acute issues      Diet: Room Service  Diet  Combination Diet Regular Diet Adult; Safe Tray - with utensils    DVT Prophylaxis: Ambulate every shift  Valentin Catheter: not present  Code Status: Full Code           Disposition Plan   Expected discharge: pending placement, guardianship  Deuce Turner MD  Hospitalist Service  [facility]  Contact information available via [facility] Paging/Directory    ______________________________________________________________________    Interval History   No complaints    Physical Exam   Vital Signs: T 96.7   HR 79   /68    RR 16/min    SpO2 96% on RA  Weight:     Gen: NAD, calm  Resp: no crackles,  no wheezes, no increased work of resp  CV: S1S2 heard, reg rhythm, reg rate, no pedal edema  Abdo: soft, nontender, nondistended, bowel sounds present  Ext:  calves nontender, well perfused  Neuro: alert, Ox1

## 2021-03-16 PROCEDURE — 120N000001 HC R&B MED SURG/OB

## 2021-03-16 PROCEDURE — 250N000013 HC RX MED GY IP 250 OP 250 PS 637: Performed by: HOSPITALIST

## 2021-03-16 PROCEDURE — 250N000013 HC RX MED GY IP 250 OP 250 PS 637: Performed by: INTERNAL MEDICINE

## 2021-03-16 PROCEDURE — 99231 SBSQ HOSP IP/OBS SF/LOW 25: CPT | Performed by: INTERNAL MEDICINE

## 2021-03-16 PROCEDURE — 250N000013 HC RX MED GY IP 250 OP 250 PS 637: Performed by: PSYCHIATRY & NEUROLOGY

## 2021-03-16 RX ADMIN — DOCUSATE SODIUM 100 MG: 100 CAPSULE, LIQUID FILLED ORAL at 09:17

## 2021-03-16 RX ADMIN — ATORVASTATIN CALCIUM 80 MG: 40 TABLET, FILM COATED ORAL at 20:18

## 2021-03-16 RX ADMIN — MEMANTINE 5 MG: 5 TABLET ORAL at 09:17

## 2021-03-16 RX ADMIN — HALOPERIDOL 10 MG: 5 TABLET ORAL at 20:18

## 2021-03-16 RX ADMIN — HALOPERIDOL 10 MG: 5 TABLET ORAL at 09:17

## 2021-03-16 RX ADMIN — BENZTROPINE MESYLATE 0.5 MG: 0.5 TABLET ORAL at 20:18

## 2021-03-16 RX ADMIN — HALOPERIDOL 10 MG: 5 TABLET ORAL at 14:05

## 2021-03-16 RX ADMIN — LEVOTHYROXINE SODIUM 88 MCG: 88 TABLET ORAL at 09:17

## 2021-03-16 RX ADMIN — LORAZEPAM 1 MG: 1 TABLET ORAL at 18:29

## 2021-03-16 RX ADMIN — VENLAFAXINE HYDROCHLORIDE 75 MG: 75 CAPSULE, EXTENDED RELEASE ORAL at 09:18

## 2021-03-16 RX ADMIN — SALMETEROL XINAFOATE 1 PUFF: 50 POWDER, METERED ORAL; RESPIRATORY (INHALATION) at 20:18

## 2021-03-16 RX ADMIN — OLANZAPINE 5 MG: 5 TABLET, ORALLY DISINTEGRATING ORAL at 16:28

## 2021-03-16 RX ADMIN — OLANZAPINE 5 MG: 5 TABLET, ORALLY DISINTEGRATING ORAL at 10:13

## 2021-03-16 RX ADMIN — ASPIRIN 81 MG: 81 TABLET, COATED ORAL at 09:17

## 2021-03-16 RX ADMIN — BENZTROPINE MESYLATE 0.5 MG: 0.5 TABLET ORAL at 09:18

## 2021-03-16 RX ADMIN — DOCUSATE SODIUM 100 MG: 100 CAPSULE, LIQUID FILLED ORAL at 20:18

## 2021-03-16 ASSESSMENT — ACTIVITIES OF DAILY LIVING (ADL)
ADLS_ACUITY_SCORE: 11

## 2021-03-16 NOTE — PLAN OF CARE
Pt is AxOx1, VSS. Disoriented x3. More agitated this AM & afternoon with. Prn zyprea x2 and ativan x1.  Good appetite. Up independently in room. Discharge plan pending placement and guardianship. Continue to monitor.

## 2021-03-16 NOTE — PLAN OF CARE
A&O to self only. Pt often making delusional comments thinking there are people waiting downstairs to pick him up, redirection provided. VS deferred, ordered daily. Denies pain. Independent.

## 2021-03-16 NOTE — PROGRESS NOTES
[facility]    Medicine Progress Note - Hospitalist Service       Date of Admission:  9/9/2020  Assessment & Plan       55 year old male with PMHx of schizophrenia, hx of TBI, alcohol use disorder, COPD, hyperlipidemia and hypothyroidism who was admitted on 9/9/2020 for evaluation of aggressive behavior at his group home:      Neurocognitive disorder dt hx of TBI and alcohol use disorder  Schizophrenia  Under commitment  - Continues on Haldol 10mg TID (timed for when patient tends to be most agitated), memantine 5mg daily, venlafaxine 75mg daily, benztropine 0.5mg BID      Back cellulitis in 1/2021: Resolved  Noted on 1/24, initiated on cephalexin at that time. Received local wound care and completed 7-day course of antibiotics on 1/30     Baseline Hypotension  BP 90s systolic on average. Asymptomatic. No concerns     Hyperlipidemia  Chronic and stable on atorvastatin     COPD  - Chronic and stable on salmeterol, albuterol prn     Hypothyroidism -- stable on levothyroxine     Tobacco use disorder -- nicotine patch and PRN gum     Resting tremor of upper extremities  No acute issues      Diet: Room Service  Diet  Combination Diet Regular Diet Adult; Safe Tray - with utensils    DVT Prophylaxis: Ambulate every shift  Valentin Catheter: not present  Code Status: Full Code           Disposition Plan   Expected discharge: pending placement, guardianship  Deuce Turner MD  Hospitalist Service  [facility]  Contact information available via [facility] Paging/Directory    ______________________________________________________________________    Interval History   No complaints, seems to getting more restless -- asking for the elevators.     Physical Exam   Vital Signs: T 96.7   HR 79   /68    RR 16/min    SpO2 96% on RA  Weight:     Gen: NAD, calm  Resp: no crackles,  no wheezes, no increased work of resp  CV: S1S2 heard, reg rhythm, reg rate, no pedal edema  Abdo:  soft, nontender, nondistended, bowel sounds present  Ext: calves nontender, well perfused  Neuro: alert, Ox1

## 2021-03-17 PROCEDURE — 250N000013 HC RX MED GY IP 250 OP 250 PS 637: Performed by: PSYCHIATRY & NEUROLOGY

## 2021-03-17 PROCEDURE — 120N000001 HC R&B MED SURG/OB

## 2021-03-17 PROCEDURE — 250N000013 HC RX MED GY IP 250 OP 250 PS 637: Performed by: INTERNAL MEDICINE

## 2021-03-17 PROCEDURE — 250N000013 HC RX MED GY IP 250 OP 250 PS 637: Performed by: HOSPITALIST

## 2021-03-17 PROCEDURE — 99231 SBSQ HOSP IP/OBS SF/LOW 25: CPT | Performed by: INTERNAL MEDICINE

## 2021-03-17 RX ADMIN — HALOPERIDOL 10 MG: 5 TABLET ORAL at 14:50

## 2021-03-17 RX ADMIN — ATORVASTATIN CALCIUM 80 MG: 40 TABLET, FILM COATED ORAL at 21:23

## 2021-03-17 RX ADMIN — HALOPERIDOL 10 MG: 5 TABLET ORAL at 21:23

## 2021-03-17 RX ADMIN — LEVOTHYROXINE SODIUM 88 MCG: 88 TABLET ORAL at 08:20

## 2021-03-17 RX ADMIN — OLANZAPINE 5 MG: 5 TABLET, ORALLY DISINTEGRATING ORAL at 12:14

## 2021-03-17 RX ADMIN — DOCUSATE SODIUM 100 MG: 100 CAPSULE, LIQUID FILLED ORAL at 21:23

## 2021-03-17 RX ADMIN — MEMANTINE 5 MG: 5 TABLET ORAL at 08:20

## 2021-03-17 RX ADMIN — ASPIRIN 81 MG: 81 TABLET, COATED ORAL at 08:20

## 2021-03-17 RX ADMIN — HALOPERIDOL 10 MG: 5 TABLET ORAL at 08:20

## 2021-03-17 RX ADMIN — SALMETEROL XINAFOATE 1 PUFF: 50 POWDER, METERED ORAL; RESPIRATORY (INHALATION) at 21:23

## 2021-03-17 RX ADMIN — BENZTROPINE MESYLATE 0.5 MG: 0.5 TABLET ORAL at 21:23

## 2021-03-17 RX ADMIN — LORAZEPAM 1 MG: 1 TABLET ORAL at 15:42

## 2021-03-17 RX ADMIN — DOCUSATE SODIUM 100 MG: 100 CAPSULE, LIQUID FILLED ORAL at 08:20

## 2021-03-17 RX ADMIN — VENLAFAXINE HYDROCHLORIDE 75 MG: 75 CAPSULE, EXTENDED RELEASE ORAL at 08:20

## 2021-03-17 RX ADMIN — SALMETEROL XINAFOATE 1 PUFF: 50 POWDER, METERED ORAL; RESPIRATORY (INHALATION) at 08:25

## 2021-03-17 RX ADMIN — BENZTROPINE MESYLATE 0.5 MG: 0.5 TABLET ORAL at 08:20

## 2021-03-17 ASSESSMENT — ACTIVITIES OF DAILY LIVING (ADL)
ADLS_ACUITY_SCORE: 11

## 2021-03-17 NOTE — PLAN OF CARE
[id number]:  A&Ox1.  Denies pain.  Tolerating regular diet.  Calm and cooperative this shift; scheduled medications given, no PRNs needed.  Independent.  Door alarm on and activated.  Discharge pending guardianship/placement.

## 2021-03-17 NOTE — PROGRESS NOTES
[facility]    Medicine Progress Note - Hospitalist Service       Date of Admission:  9/9/2020  Assessment & Plan       55 year old male with PMHx of schizophrenia, hx of TBI, alcohol use disorder, COPD, hyperlipidemia and hypothyroidism who was admitted on 9/9/2020 for evaluation of aggressive behavior at his group home:      Neurocognitive disorder dt hx of TBI and alcohol use disorder  Schizophrenia  Under commitment  - Continues on Haldol 10mg TID (timed for when patient tends to be most agitated), memantine 5mg daily, venlafaxine 75mg daily, benztropine 0.5mg BID      Back cellulitis in 1/2021: Resolved  Noted on 1/24, initiated on cephalexin at that time. Received local wound care and completed 7-day course of antibiotics on 1/30     Baseline Hypotension  BP 90s systolic on average. Asymptomatic. No concerns     Hyperlipidemia  Chronic and stable on atorvastatin     COPD  - Chronic and stable on salmeterol, albuterol prn     Hypothyroidism -- stable on levothyroxine     Tobacco use disorder -- nicotine patch and PRN gum     Resting tremor of upper extremities  No acute issues      Diet: Room Service  Diet  Combination Diet Regular Diet Adult; Safe Tray - with utensils    DVT Prophylaxis: Ambulate every shift  Valentin Catheter: not present  Code Status: Full Code           Disposition Plan   Expected discharge: pending placement, guardianship  Deuce Turner MD  Hospitalist Service  [facility]  Contact information available via [facility] Paging/Directory    ______________________________________________________________________    Interval History   No complaints, seems to getting more restless -- asking for the elevators.     Physical Exam   Vital Signs: T 96.7   HR 79   /68    RR 16/min    SpO2 96% on RA  Weight:     Gen: NAD, calm  Resp: no crackles,  no wheezes, no increased work of resp  CV: S1S2 heard, reg rhythm, reg rate, no pedal edema  Abdo:  "soft, nontender, nondistended, bowel sounds present  Ext: calves nontender, well perfused  Neuro: alert, Ox1 (date \"no idea\", place \"[place]\"), no focal deficits    "

## 2021-03-17 NOTE — PLAN OF CARE
A&O to self only. Delusional but redirectable. VS deferred, ordered daily. Denies pain. Independent.

## 2021-03-17 NOTE — PLAN OF CARE
Alert. Oriented to self only. Anxious this afternoon about wanting a cigarette and to leave hospital - Continued scheduled haldol, PRN zyprexa x1 and PRN ativan x1 given. Moderately effective. Good intake on regular diet. Ambulated halls x2 today. Shower done.

## 2021-03-18 PROCEDURE — 250N000013 HC RX MED GY IP 250 OP 250 PS 637: Performed by: INTERNAL MEDICINE

## 2021-03-18 PROCEDURE — 99231 SBSQ HOSP IP/OBS SF/LOW 25: CPT | Performed by: INTERNAL MEDICINE

## 2021-03-18 PROCEDURE — 250N000013 HC RX MED GY IP 250 OP 250 PS 637: Performed by: HOSPITALIST

## 2021-03-18 PROCEDURE — 120N000001 HC R&B MED SURG/OB

## 2021-03-18 RX ADMIN — HALOPERIDOL 10 MG: 5 TABLET ORAL at 08:04

## 2021-03-18 RX ADMIN — DOCUSATE SODIUM 100 MG: 100 CAPSULE, LIQUID FILLED ORAL at 08:04

## 2021-03-18 RX ADMIN — ASPIRIN 81 MG: 81 TABLET, COATED ORAL at 08:04

## 2021-03-18 RX ADMIN — HALOPERIDOL 10 MG: 5 TABLET ORAL at 14:11

## 2021-03-18 RX ADMIN — BENZTROPINE MESYLATE 0.5 MG: 0.5 TABLET ORAL at 08:04

## 2021-03-18 RX ADMIN — OLANZAPINE 5 MG: 5 TABLET, ORALLY DISINTEGRATING ORAL at 13:32

## 2021-03-18 RX ADMIN — MEMANTINE 5 MG: 5 TABLET ORAL at 08:04

## 2021-03-18 RX ADMIN — DOCUSATE SODIUM 100 MG: 100 CAPSULE, LIQUID FILLED ORAL at 20:57

## 2021-03-18 RX ADMIN — BENZTROPINE MESYLATE 0.5 MG: 0.5 TABLET ORAL at 20:57

## 2021-03-18 RX ADMIN — ATORVASTATIN CALCIUM 80 MG: 40 TABLET, FILM COATED ORAL at 20:57

## 2021-03-18 RX ADMIN — VENLAFAXINE HYDROCHLORIDE 75 MG: 75 CAPSULE, EXTENDED RELEASE ORAL at 08:04

## 2021-03-18 RX ADMIN — HALOPERIDOL 10 MG: 5 TABLET ORAL at 20:57

## 2021-03-18 RX ADMIN — LEVOTHYROXINE SODIUM 88 MCG: 88 TABLET ORAL at 08:04

## 2021-03-18 ASSESSMENT — ACTIVITIES OF DAILY LIVING (ADL)
ADLS_ACUITY_SCORE: 11

## 2021-03-18 NOTE — PROGRESS NOTES
[facility]    Medicine Progress Note - Hospitalist Service       Date of Admission:  9/9/2020  Assessment & Plan       55 year old male with PMHx of schizophrenia, hx of TBI, alcohol use disorder, COPD, hyperlipidemia and hypothyroidism who was admitted on 9/9/2020 for evaluation of aggressive behavior at his group home:      Neurocognitive disorder dt hx of TBI and alcohol use disorder  Schizophrenia  Under commitment  - Continues on Haldol 10mg TID (timed for when patient tends to be most agitated), memantine 5mg daily, venlafaxine 75mg daily, benztropine 0.5mg BID      Back cellulitis in 1/2021: Resolved  Noted on 1/24, initiated on cephalexin at that time. Received local wound care and completed 7-day course of antibiotics on 1/30     Baseline Hypotension  BP 90s systolic on average. Asymptomatic. No concerns     Hyperlipidemia  Chronic and stable on atorvastatin     COPD  - Chronic and stable on salmeterol, albuterol prn     Hypothyroidism -- stable on levothyroxine     Tobacco use disorder -- nicotine patch and PRN gum     Resting tremor of upper extremities  No acute issues      Diet: Room Service  Diet  Combination Diet Regular Diet Adult; Safe Tray - with utensils    DVT Prophylaxis: Ambulate every shift  Valentin Catheter: not present  Code Status: Full Code           Disposition Plan   Expected discharge: pending placement, guardianship  Deuce Turner MD  Hospitalist Service  [facility]  Contact information available via [facility] Paging/Directory    ______________________________________________________________________    Interval History   No complaints, asking for ginger ale      Physical Exam   Vital Signs: T 96.7   HR 79   /68    RR 16/min    SpO2 96% on RA  Weight:     Gen: NAD, calm  Resp: no crackles,  no wheezes, no increased work of resp  CV: S1S2 heard, reg rhythm, reg rate, no pedal edema  Abdo: soft, nontender, nondistended, bowel  sounds present  Ext: calves nontender, well perfused  Neuro: alert, Ox1, no focal deficits

## 2021-03-18 NOTE — PLAN OF CARE
A&O to self only. Delusional but redirectable. VS deferred, ordered daily. Denies pain. Independent. Slept most of the night.

## 2021-03-18 NOTE — PLAN OF CARE
Alert. Oriented to self only. VSS, denies pain. Up independently. Regular diet - good intake. Zyprexa x1 this afternoon for some agitation - wanting to leave the unit for a smoke.

## 2021-03-19 PROCEDURE — 250N000013 HC RX MED GY IP 250 OP 250 PS 637: Performed by: INTERNAL MEDICINE

## 2021-03-19 PROCEDURE — 99231 SBSQ HOSP IP/OBS SF/LOW 25: CPT | Performed by: INTERNAL MEDICINE

## 2021-03-19 PROCEDURE — 250N000013 HC RX MED GY IP 250 OP 250 PS 637: Performed by: PSYCHIATRY & NEUROLOGY

## 2021-03-19 PROCEDURE — 250N000013 HC RX MED GY IP 250 OP 250 PS 637: Performed by: HOSPITALIST

## 2021-03-19 PROCEDURE — 120N000001 HC R&B MED SURG/OB

## 2021-03-19 RX ADMIN — HALOPERIDOL 10 MG: 5 TABLET ORAL at 20:18

## 2021-03-19 RX ADMIN — LEVOTHYROXINE SODIUM 88 MCG: 88 TABLET ORAL at 08:06

## 2021-03-19 RX ADMIN — OLANZAPINE 5 MG: 5 TABLET, ORALLY DISINTEGRATING ORAL at 11:26

## 2021-03-19 RX ADMIN — HALOPERIDOL 10 MG: 5 TABLET ORAL at 14:00

## 2021-03-19 RX ADMIN — LORAZEPAM 1 MG: 1 TABLET ORAL at 13:02

## 2021-03-19 RX ADMIN — ATORVASTATIN CALCIUM 80 MG: 40 TABLET, FILM COATED ORAL at 20:18

## 2021-03-19 RX ADMIN — LORAZEPAM 1 MG: 1 TABLET ORAL at 00:52

## 2021-03-19 RX ADMIN — SALMETEROL XINAFOATE 1 PUFF: 50 POWDER, METERED ORAL; RESPIRATORY (INHALATION) at 20:17

## 2021-03-19 RX ADMIN — VENLAFAXINE HYDROCHLORIDE 75 MG: 75 CAPSULE, EXTENDED RELEASE ORAL at 08:06

## 2021-03-19 RX ADMIN — MEMANTINE 5 MG: 5 TABLET ORAL at 08:06

## 2021-03-19 RX ADMIN — ASPIRIN 81 MG: 81 TABLET, COATED ORAL at 08:06

## 2021-03-19 RX ADMIN — LORAZEPAM 1 MG: 1 TABLET ORAL at 21:53

## 2021-03-19 RX ADMIN — DOCUSATE SODIUM 100 MG: 100 CAPSULE, LIQUID FILLED ORAL at 20:19

## 2021-03-19 RX ADMIN — OLANZAPINE 5 MG: 5 TABLET, ORALLY DISINTEGRATING ORAL at 14:27

## 2021-03-19 RX ADMIN — HALOPERIDOL 10 MG: 5 TABLET ORAL at 08:06

## 2021-03-19 RX ADMIN — DOCUSATE SODIUM 100 MG: 100 CAPSULE, LIQUID FILLED ORAL at 08:06

## 2021-03-19 RX ADMIN — BENZTROPINE MESYLATE 0.5 MG: 0.5 TABLET ORAL at 08:06

## 2021-03-19 RX ADMIN — BENZTROPINE MESYLATE 0.5 MG: 0.5 TABLET ORAL at 20:19

## 2021-03-19 ASSESSMENT — ACTIVITIES OF DAILY LIVING (ADL)
ADLS_ACUITY_SCORE: 11

## 2021-03-19 ASSESSMENT — MIFFLIN-ST. JEOR: SCORE: 1706.69

## 2021-03-19 NOTE — PLAN OF CARE
[id number]. Pt is A and O X1. Calm this afternoon. VSS on RA. Denies pain. Ind in the room. Regular diet, good appetite. Discharge pending placement and guardianship. Continue to monitor.

## 2021-03-19 NOTE — PROGRESS NOTES
[facility]    Medicine Progress Note - Hospitalist Service       Date of Admission:  9/9/2020  Assessment & Plan       55 year old male with PMHx of schizophrenia, hx of TBI, alcohol use disorder, COPD, hyperlipidemia and hypothyroidism who was admitted on 9/9/2020 for evaluation of aggressive behavior at his group home:      Neurocognitive disorder dt hx of TBI and alcohol use disorder  Schizophrenia  Under commitment  - Continues on Haldol 10mg TID (timed for when patient tends to be most agitated), memantine 5mg daily, venlafaxine 75mg daily, benztropine 0.5mg BID      Back cellulitis in 1/2021: Resolved  Noted on 1/24, initiated on cephalexin at that time. Received local wound care and completed 7-day course of antibiotics on 1/30     Baseline Hypotension  BP 90s systolic on average. Asymptomatic. No concerns     Hyperlipidemia  Chronic and stable on atorvastatin     COPD  - Chronic and stable on salmeterol, albuterol prn     Hypothyroidism -- stable on levothyroxine     Tobacco use disorder -- nicotine patch and PRN gum     Resting tremor of upper extremities  No acute issues      Diet: Room Service  Diet  Combination Diet Regular Diet Adult; Safe Tray - with utensils    DVT Prophylaxis: Ambulate every shift  Valentin Catheter: not present  Code Status: Full Code           Disposition Plan   Expected discharge: pending placement, guardianship  Deuce Turner MD  Hospitalist Service  [facility]  Contact information available via [facility] Paging/Directory    ______________________________________________________________________    Interval History   No complaints, asking for ginger ale ... again, and provided.     Physical Exam   Vital Signs: T 96.7   HR 79   /68    RR 16/min    SpO2 96% on RA  Weight:     Gen: NAD, calm  Resp: no crackles,  no wheezes, no increased work of resp  CV: S1S2 heard, reg rhythm, reg rate, no pedal edema  Abdo: soft,  nontender, nondistended, bowel sounds present  Ext: calves nontender, well perfused  Neuro: alert, Ox1, no focal deficits

## 2021-03-19 NOTE — PLAN OF CARE
Pt is AxOx1, VSS. Disoriented x3. More agitated this AM & afternoon with. Prn zyprea x2 and ativan x1.  Good appetite. Up independently in room. Discharge plan pending placement and guardianship. Continue to monitor

## 2021-03-19 NOTE — PLAN OF CARE
A&O to self only. Up w/ ind in room. Regular diet. VSS on RA. No complaints of pain. Patient was more agitated, did give PRN ativan x1, resolved, slept soundly between cares.

## 2021-03-20 PROCEDURE — 250N000013 HC RX MED GY IP 250 OP 250 PS 637: Performed by: PSYCHIATRY & NEUROLOGY

## 2021-03-20 PROCEDURE — 250N000013 HC RX MED GY IP 250 OP 250 PS 637: Performed by: INTERNAL MEDICINE

## 2021-03-20 PROCEDURE — 120N000001 HC R&B MED SURG/OB

## 2021-03-20 PROCEDURE — 99231 SBSQ HOSP IP/OBS SF/LOW 25: CPT | Performed by: INTERNAL MEDICINE

## 2021-03-20 PROCEDURE — 250N000013 HC RX MED GY IP 250 OP 250 PS 637: Performed by: HOSPITALIST

## 2021-03-20 RX ADMIN — HALOPERIDOL 10 MG: 5 TABLET ORAL at 14:36

## 2021-03-20 RX ADMIN — HALOPERIDOL 10 MG: 5 TABLET ORAL at 21:25

## 2021-03-20 RX ADMIN — DOCUSATE SODIUM 100 MG: 100 CAPSULE, LIQUID FILLED ORAL at 21:25

## 2021-03-20 RX ADMIN — ASPIRIN 81 MG: 81 TABLET, COATED ORAL at 08:33

## 2021-03-20 RX ADMIN — ATORVASTATIN CALCIUM 80 MG: 40 TABLET, FILM COATED ORAL at 21:25

## 2021-03-20 RX ADMIN — MEMANTINE 5 MG: 5 TABLET ORAL at 08:33

## 2021-03-20 RX ADMIN — DOCUSATE SODIUM 100 MG: 100 CAPSULE, LIQUID FILLED ORAL at 08:33

## 2021-03-20 RX ADMIN — LORAZEPAM 1 MG: 1 TABLET ORAL at 12:24

## 2021-03-20 RX ADMIN — BENZTROPINE MESYLATE 0.5 MG: 0.5 TABLET ORAL at 08:33

## 2021-03-20 RX ADMIN — LEVOTHYROXINE SODIUM 88 MCG: 88 TABLET ORAL at 08:33

## 2021-03-20 RX ADMIN — VENLAFAXINE HYDROCHLORIDE 75 MG: 75 CAPSULE, EXTENDED RELEASE ORAL at 08:33

## 2021-03-20 RX ADMIN — HALOPERIDOL 10 MG: 5 TABLET ORAL at 09:45

## 2021-03-20 RX ADMIN — SALMETEROL XINAFOATE 1 PUFF: 50 POWDER, METERED ORAL; RESPIRATORY (INHALATION) at 21:27

## 2021-03-20 RX ADMIN — BENZTROPINE MESYLATE 0.5 MG: 0.5 TABLET ORAL at 21:25

## 2021-03-20 ASSESSMENT — ACTIVITIES OF DAILY LIVING (ADL)
ADLS_ACUITY_SCORE: 11

## 2021-03-20 NOTE — PLAN OF CARE
Primary Diagnosis: Dementia with behavioral issues  PMH: schizophrenia, TBI, ETOH abuse, COPD, smoker.  Orientation: Alert to self only   Aggression Stop Light: Yellow; prn zyprexa and ativan available  Mobility: Ind in room, door alarm active  Pain Management: Denies   Diet: Regular, needs meals ordered. Offer snacks (likes ginger ale) if appears agitated.  Bowel/Bladder: Continent  Abnormal Lab/Assessments: Vitals x1 daily  Drain/Device/Wound: No IV access - MD aware  Consults: SWS  D/C Day/Goals/Place: Committed, expires July 2021, sister is applying for guardianship.    *Pt has snacks in [facility] fridge       3-19-21 [id number]  Pt is A and O X1 -2 upon tx to room 602 from station 88.   VSS on RA. Denies pain. Ind in the room with door alarm. Regular diet, good appetite. Discharge pending placement and guardianship. Continue to monitor. Medicated at HS with Ativan 1.0 mg at HS . Transferring nurse stated pt. More agitated this afternoon and frequently out of room into the hallway.

## 2021-03-20 NOTE — PLAN OF CARE
Disoriented to place, time and situation.  Confused.  Compliant and redirectable, but gets more irritable and agitated as day goes by, PRNs uitilized.  VSS.  Room air.  Denies pain.  Frequently sets off door alarm and walks out of room, repeats same question over and over.  IND in room.  Denies any difficulty with B/B.  No IV.  Discharge pending guardianship and safe disposition.

## 2021-03-20 NOTE — PROGRESS NOTES
Pt. Alert to self only. Gave handoff report to Nurse on Station 66 around 2030. Patient transferred @ 2100, with belongings.

## 2021-03-20 NOTE — PLAN OF CARE
Primary Diagnosis: Dementia with behavioral issues  PMH: schizophrenia, TBI, ETOH abuse, COPD, smoker.  Orientation: Alert to self only   Aggression Stop Light: Yellow; prn zyprexa and ativan available.  Calm this shift, slept.  Mobility: Ind in room, door alarm active  Pain Management: Denies   Diet: Regular, needs meals ordered. Offer snacks (likes ginger ale) if appears agitated.  Bowel/Bladder: Continent  Abnormal Lab/Assessments: VSS on RA; Vitals x1 daily  Drain/Device/Wound: No IV access - MD aware  Consults: None  D/C Day/Goals/Place: Committed, expires July 2021, sister is applying for guardianship.      *Pt has snacks in [facility] fridge

## 2021-03-20 NOTE — PROGRESS NOTES
[facility]    Medicine Progress Note - Hospitalist Service       Date of Admission:  9/9/2020  Assessment & Plan       55 year old male with PMHx of schizophrenia, hx of TBI, alcohol use disorder, COPD, hyperlipidemia and hypothyroidism who was admitted on 9/9/2020 for evaluation of aggressive behavior at his group home:      Neurocognitive disorder dt hx of TBI and alcohol use disorder  Schizophrenia  Under commitment  - Continues on Haldol 10mg TID (timed for when patient tends to be most agitated), memantine 5mg daily, venlafaxine 75mg daily, benztropine 0.5mg BID       Hyperlipidemia  Chronic and stable on atorvastatin     COPD  - Chronic and stable on salmeterol, albuterol prn     Hypothyroidism -- stable on levothyroxine     Tobacco use disorder -- nicotine patch and PRN gum     Resting tremor of upper extremities  No acute issues      Diet: Room Service  Diet  Combination Diet Regular Diet Adult; Safe Tray - with utensils    DVT Prophylaxis: Ambulate every shift  Valentin Catheter: not present  Code Status: Full Code           Disposition Plan   Expected discharge: pending placement, guardianship  Deuce Turner MD  Hospitalist Service  [facility]  Contact information available via [facility] Paging/Directory    ______________________________________________________________________    Interval History   No complaints, up walking in [place]z with staff.     Physical Exam   Vital Signs: T 96.7   HR 79   /68    RR 16/min    SpO2 96% on RA  Weight:     Gen: NAD, calm  Resp: no crackles,  no wheezes, no increased work of resp  CV: S1S2 heard, reg rhythm, reg rate, no pedal edema  Abdo: soft, nontender, nondistended, bowel sounds present  Ext: calves nontender, well perfused  Neuro: alert, Ox1, no focal deficits

## 2021-03-21 PROCEDURE — 250N000013 HC RX MED GY IP 250 OP 250 PS 637: Performed by: HOSPITALIST

## 2021-03-21 PROCEDURE — 250N000013 HC RX MED GY IP 250 OP 250 PS 637: Performed by: INTERNAL MEDICINE

## 2021-03-21 PROCEDURE — 99231 SBSQ HOSP IP/OBS SF/LOW 25: CPT | Performed by: INTERNAL MEDICINE

## 2021-03-21 PROCEDURE — 120N000001 HC R&B MED SURG/OB

## 2021-03-21 PROCEDURE — 250N000013 HC RX MED GY IP 250 OP 250 PS 637: Performed by: PSYCHIATRY & NEUROLOGY

## 2021-03-21 RX ADMIN — LORAZEPAM 1 MG: 1 TABLET ORAL at 11:29

## 2021-03-21 RX ADMIN — LEVOTHYROXINE SODIUM 88 MCG: 88 TABLET ORAL at 09:19

## 2021-03-21 RX ADMIN — ATORVASTATIN CALCIUM 80 MG: 40 TABLET, FILM COATED ORAL at 21:17

## 2021-03-21 RX ADMIN — HALOPERIDOL 10 MG: 5 TABLET ORAL at 21:17

## 2021-03-21 RX ADMIN — VENLAFAXINE HYDROCHLORIDE 75 MG: 75 CAPSULE, EXTENDED RELEASE ORAL at 09:19

## 2021-03-21 RX ADMIN — SALMETEROL XINAFOATE 1 PUFF: 50 POWDER, METERED ORAL; RESPIRATORY (INHALATION) at 21:17

## 2021-03-21 RX ADMIN — ASPIRIN 81 MG: 81 TABLET, COATED ORAL at 09:20

## 2021-03-21 RX ADMIN — HALOPERIDOL 10 MG: 5 TABLET ORAL at 09:19

## 2021-03-21 RX ADMIN — HALOPERIDOL 10 MG: 5 TABLET ORAL at 14:35

## 2021-03-21 RX ADMIN — BENZTROPINE MESYLATE 0.5 MG: 0.5 TABLET ORAL at 09:19

## 2021-03-21 RX ADMIN — DOCUSATE SODIUM 100 MG: 100 CAPSULE, LIQUID FILLED ORAL at 09:19

## 2021-03-21 RX ADMIN — MEMANTINE 5 MG: 5 TABLET ORAL at 09:20

## 2021-03-21 RX ADMIN — DOCUSATE SODIUM 100 MG: 100 CAPSULE, LIQUID FILLED ORAL at 21:17

## 2021-03-21 RX ADMIN — SALMETEROL XINAFOATE 1 PUFF: 50 POWDER, METERED ORAL; RESPIRATORY (INHALATION) at 09:20

## 2021-03-21 RX ADMIN — BENZTROPINE MESYLATE 0.5 MG: 0.5 TABLET ORAL at 21:17

## 2021-03-21 ASSESSMENT — ACTIVITIES OF DAILY LIVING (ADL)
ADLS_ACUITY_SCORE: 11

## 2021-03-21 NOTE — PLAN OF CARE
DATE & TIME: 3/20/21 [id number]    Cognitive Concerns/ Orientation : Alert to self only.    BEHAVIOR & AGGRESSION TOOL COLOR: Green   CIWA SCORE: NA   ABNL VS/O2: Daily vitals.   MOBILITY: IND in room. Door alarm.   PAIN MANAGMENT: Denies  DIET: Regular  BOWEL/BLADDER: Continent  ABNL LAB/BG: No new labs  DRAIN/DEVICES: No IV acess  TELEMETRY RHYTHM: NA  SKIN: Dry, flaky, intact.   TESTS/PROCEDURES: NA  D/C DAY/GOALS/PLACE: Pending placement and guardianship.   OTHER IMPORTANT INFO: Frequently sets off door alarm, easily redirectable this shift.

## 2021-03-21 NOTE — PLAN OF CARE
Nursing note  DATE & TIME: 3/21/21 [id number].    Cognitive Concerns/ Orientation : Alert to self and place but he does not know which hospital.  BEHAVIOR & AGGRESSION TOOL COLOR: Green   CIWA SCORE: NA        ABNL VS/O2: Daily vitals. /77   Pulse 85   Temp 97.7  F (36.5  C) (Oral)   Resp 16   Ht 1.829 m (6')   Wt 84.4 kg (186 lb)   SpO2 99%   BMI 25.23 kg/m    MOBILITY: IND in room. Door alarm.   PAIN MANAGMENT: Denies  DIET: Regular  BOWEL/BLADDER: Continent  ABNL LAB/BG: No new labs  DRAIN/DEVICES: No IV acess  TELEMETRY RHYTHM: NA  SKIN: Dry, flaky, intact.   TESTS/PROCEDURES: NA  D/C DAY/GOALS/PLACE: Pending placement and guardianship.   OTHER IMPORTANT INFO: Frequently sets off door alarm, easily redirectable. Occasionally ambulates on the hallway with A 1, long arm sit.

## 2021-03-21 NOTE — PLAN OF CARE
Nursing note  DATE & TIME: 3/21/21 5084-3351   Cognitive Concerns/ Orientation : Alert to self and place but he does not know which hospital.  BEHAVIOR & AGGRESSION TOOL COLOR: Green   CIWA SCORE: NA   ABNL VS/O2: Daily vitals. /77   Pulse 85   Temp 97.7  F (36.5  C) (Oral)   Resp 16   Ht 1.829 m (6')   Wt 84.4 kg (186 lb)   SpO2 99%   BMI 25.23 kg/m    MOBILITY: IND in room. Door alarm.   PAIN MANAGMENT: Denies  DIET: Regular  BOWEL/BLADDER: Continent  ABNL LAB/BG: No new labs  DRAIN/DEVICES: No IV acess  TELEMETRY RHYTHM: NA  SKIN: Dry, flaky, intact.   TESTS/PROCEDURES: NA  D/C DAY/GOALS/PLACE: Pending placement and guardianship.   OTHER IMPORTANT INFO: Frequently sets off door alarm, easily redirectable. Occasionally ambulates on the hallway with A 1, long arm sit,prn ativan given once and pt also received his scheduled haldol..

## 2021-03-21 NOTE — PROGRESS NOTES
[facility]    Medicine Progress Note - Hospitalist Service       Date of Admission:  9/9/2020  Assessment & Plan       55 year old male with PMHx of schizophrenia, hx of TBI, alcohol use disorder, COPD, hyperlipidemia and hypothyroidism who was admitted on 9/9/2020 for evaluation of aggressive behavior at his group home:      Neurocognitive disorder dt hx of TBI and alcohol use disorder  Schizophrenia  Under commitment  - Continues on Haldol 10mg TID (timed for when patient tends to be most agitated), memantine 5mg daily, venlafaxine 75mg daily, benztropine 0.5mg BID       Hyperlipidemia  Chronic and stable on atorvastatin     COPD  - Chronic and stable on salmeterol, albuterol prn     Hypothyroidism -- stable on levothyroxine     Tobacco use disorder -- nicotine patch and PRN gum      Diet: Room Service  Diet  Combination Diet Regular Diet Adult; Safe Tray - with utensils    DVT Prophylaxis: Ambulate every shift  Valentin Catheter: not present  Code Status: Full Code           Disposition Plan   Expected discharge: pending placement, guardianship  Deuce Turner MD  Hospitalist Service  [facility]  Contact information available via [facility] Paging/Directory    ______________________________________________________________________    Interval History   No complaints.      Physical Exam   Vital Signs: T 96.7   HR 79   /68    RR 16/min    SpO2 96% on RA  Weight:     Gen: NAD, calm  Resp: no crackles,  no wheezes, no increased work of resp  CV: S1S2 heard, reg rhythm, reg rate, no pedal edema  Abdo: soft, nontender, nondistended, bowel sounds present  Ext: calves nontender, well perfused  Neuro: alert, Ox1, no focal deficits

## 2021-03-22 PROCEDURE — 250N000013 HC RX MED GY IP 250 OP 250 PS 637: Performed by: NURSE PRACTITIONER

## 2021-03-22 PROCEDURE — 250N000013 HC RX MED GY IP 250 OP 250 PS 637: Performed by: PSYCHIATRY & NEUROLOGY

## 2021-03-22 PROCEDURE — 120N000001 HC R&B MED SURG/OB

## 2021-03-22 PROCEDURE — 99231 SBSQ HOSP IP/OBS SF/LOW 25: CPT | Performed by: INTERNAL MEDICINE

## 2021-03-22 PROCEDURE — 250N000013 HC RX MED GY IP 250 OP 250 PS 637: Performed by: INTERNAL MEDICINE

## 2021-03-22 PROCEDURE — 250N000013 HC RX MED GY IP 250 OP 250 PS 637: Performed by: HOSPITALIST

## 2021-03-22 RX ADMIN — BENZTROPINE MESYLATE 0.5 MG: 0.5 TABLET ORAL at 10:08

## 2021-03-22 RX ADMIN — HALOPERIDOL 10 MG: 5 TABLET ORAL at 20:28

## 2021-03-22 RX ADMIN — ATORVASTATIN CALCIUM 80 MG: 40 TABLET, FILM COATED ORAL at 20:28

## 2021-03-22 RX ADMIN — VENLAFAXINE HYDROCHLORIDE 75 MG: 75 CAPSULE, EXTENDED RELEASE ORAL at 10:08

## 2021-03-22 RX ADMIN — SALMETEROL XINAFOATE 1 PUFF: 50 POWDER, METERED ORAL; RESPIRATORY (INHALATION) at 20:32

## 2021-03-22 RX ADMIN — ASPIRIN 81 MG: 81 TABLET, COATED ORAL at 10:08

## 2021-03-22 RX ADMIN — MEMANTINE 5 MG: 5 TABLET ORAL at 10:08

## 2021-03-22 RX ADMIN — HALOPERIDOL 10 MG: 5 TABLET ORAL at 16:05

## 2021-03-22 RX ADMIN — SALMETEROL XINAFOATE 1 PUFF: 50 POWDER, METERED ORAL; RESPIRATORY (INHALATION) at 10:10

## 2021-03-22 RX ADMIN — LORAZEPAM 1 MG: 1 TABLET ORAL at 11:29

## 2021-03-22 RX ADMIN — DOCUSATE SODIUM 100 MG: 100 CAPSULE, LIQUID FILLED ORAL at 10:08

## 2021-03-22 RX ADMIN — DOCUSATE SODIUM 100 MG: 100 CAPSULE, LIQUID FILLED ORAL at 20:28

## 2021-03-22 RX ADMIN — HALOPERIDOL 10 MG: 5 TABLET ORAL at 10:08

## 2021-03-22 RX ADMIN — LEVOTHYROXINE SODIUM 88 MCG: 88 TABLET ORAL at 10:07

## 2021-03-22 RX ADMIN — NICOTINE POLACRILEX 2 MG: 2 GUM, CHEWING ORAL at 11:29

## 2021-03-22 RX ADMIN — BENZTROPINE MESYLATE 0.5 MG: 0.5 TABLET ORAL at 20:28

## 2021-03-22 ASSESSMENT — ACTIVITIES OF DAILY LIVING (ADL)
ADLS_ACUITY_SCORE: 11

## 2021-03-22 NOTE — PROGRESS NOTES
Care Management Follow Up    Length of Stay (days): 184    Expected Discharge Date: 03/23/21(Group Home)     Concerns to be Addressed: patient refuses services, discharge planning     Patient plan of care discussed at interdisciplinary rounds: Yes    Anticipated Discharge Disposition: Other (Comments)     Anticipated Discharge Services: None  Anticipated Discharge DME: None    Patient/family educated on Medicare website which has current facility and service quality ratings: (not applicable)  Education Provided on the Discharge Plan:    Patient/Family in Agreement with the Plan: no    Referrals Placed by CM/SW: Post Acute Facilities  Private pay costs discussed:     Additional Information:  Writer has paged Temo Blair, , for an update, asking if  patient's sister has agreed to be the guardian for patient.   Lulu Contreras, ASIMSW

## 2021-03-22 NOTE — PROGRESS NOTES
[facility]    Medicine Progress Note - Hospitalist Service        Date of Admission:  9/9/2020  6:29 AM    Assessment & Plan:   55 year old male with PMHx of schizophrenia, hx of TBI, alcohol use disorder, COPD, hyperlipidemia and hypothyroidism who was admitted on 9/9/2020 for evaluation of aggressive behavior at his group home.       Neurocognitive disorder dt hx of TBI and alcohol use disorder  Schizophrenia  Under commitment  Had been residing in group home prior to admission.  Brought to hospital for evaluation of aggressive behaviors. Group home now unwilling to take him back. Has had numerous CODE 21s called this stay. Seen by psych, meds adjusted. Is currently under civil commitment and court hold through [facility] until 7/31/21.   -Continues on Haldol 10mg TID (timed for when patient tends to be most agitated), memantine 5mg daily, venlafaxine 75mg daily, benztropine 0.5mg BID   -prns available for agitation (including Haldol, Zyprexa and Ativan)  -SW following for placement     Back cellulitis in 1/2021: Resolved  -Noted on 1/24, initiated on cephalexin at that time. Received local wound care and completed 7-day course of antibiotics on 1/30     Baseline Hypotension  -resolved     Hyperlipidemia  Chronic and stable on atorvastatin     COPD  Not O2 dependent.   -Chronic and stable on salmeterol, albuterol prn     Hypothyroidism  Chronic and stable on levothyroxine     Tobacco use disorder  Using nicotine patch and PRN gum     Resting tremor of upper extremities  No acute issues        Diet: Room Service  Diet  Combination Diet Regular Diet Adult; Safe Tray - with utensils     DVT Prophylaxis: Pneumatic Compression Devices   Valentin Catheter: not present  Code Status: Full Code     Disposition Plan    Expected discharge: Awaiting placement  Entered: Teja Cardona MD 03/22/2021, 10:48 AM        The patient's care was discussed with the Bedside Nurse and Patient.    Teja Cardona  MD  Hospitalist Service  [facility]    ______________________________________________________________________    Interval History   No acute events overnight.  Patient denies new complaints.    Data reviewed today: I reviewed all medications, new labs and imaging results over the last 24 hours. I personally reviewed no images or EKG's today.    Physical Exam   Vital signs:  Temp: 97.9  F (36.6  C) Temp src: Oral BP: 134/81 Pulse: 87   Resp: 16 SpO2: 96 % O2 Device: None (Room air)   Height: 182.9 cm (6') Weight: 84.4 kg (186 lb)  Estimated body mass index is 25.23 kg/m  as calculated from the following:    Height as of this encounter: 1.829 m (6').    Weight as of this encounter: 84.4 kg (186 lb).      Wt Readings from Last 2 Encounters:   03/19/21 84.4 kg (186 lb)   08/26/20 70.3 kg (155 lb)       Gen: AAOX1-2, NAD  Resp: CTA B/L, normal WOB  CVS: RRR, no murmur  Abd/GI: Soft, non-tender. BS- normoactive.    Neuro- CN- intact. No focal deficits.      Data   No lab results found in last 7 days.    No results found for this or any previous visit (from the past 24 hour(s)).  Medications       aspirin  81 mg Oral Daily     atorvastatin  80 mg Oral At Bedtime     benztropine  0.5 mg Oral BID     docusate sodium  100 mg Oral BID     haloperidol  10 mg Oral TID     levothyroxine  88 mcg Oral Daily     memantine  5 mg Oral Daily     salmeterol  1 puff Inhalation BID     venlafaxine  75 mg Oral Daily with breakfast

## 2021-03-22 NOTE — PLAN OF CARE
DATE & TIME: 3/21/21 7818-0655   Cognitive Concerns/ Orientation : Alert to self and place but he does not know which hospital.  BEHAVIOR & AGGRESSION TOOL COLOR: Green   CIWA SCORE: NA   ABNL VS/O2: Daily vitals.   MOBILITY: IND in room. Door alarm.   PAIN MANAGMENT: Denies  DIET: Regular  BOWEL/BLADDER: Continent  ABNL LAB/BG: No new labs  DRAIN/DEVICES: No IV acess  TELEMETRY RHYTHM: NA  SKIN: Dry, flaky, intact.   TESTS/PROCEDURES: NA  D/C DAY/GOALS/PLACE: Pending placement and guardianship.   OTHER IMPORTANT INFO: Frequently sets off door alarm, easily redirectable. Occasionally ambulates on the hallway w/A1, long arm sit.

## 2021-03-22 NOTE — PLAN OF CARE
Cognitive Concerns/ Orientation : A&O x 2, disoriented to time and situation   BEHAVIOR & AGGRESSION TOOL COLOR: Green     ABNL VS/O2: VSS on RA (Daily vital signs)  MOBILITY: Independent in room  PAIN MANAGMENT: Denies pain  DIET: Regular diet, tolerating, good appetite.   BOWEL/BLADDER: Continent, up to bathroom   ABNL LAB/BG: n/a  DRAIN/DEVICES: n/a  TELEMETRY RHYTHM: n/a  SKIN: Dry flaky skin. Small scabs to shins  TESTS/PROCEDURES: n/a  D/C DAY/GOALS/PLACE: Pending placement and guardianship, SW following.   OTHER IMPORTANT INFO: Calm and redirectable most of the shift, periods of some agitation (wanting to leave/go out and smoke), PRN PO Ativan x1.

## 2021-03-22 NOTE — PLAN OF CARE
DATE & TIME: 3/21/21, 4469 - 4311   Cognitive Concerns/ Orientation : A&O x 2, disoriented to time and situation   BEHAVIOR & AGGRESSION TOOL COLOR: Green   ABNL VS/O2: Daily vital signs  MOBILITY: Independent in room  PAIN MANAGMENT: Denied  DIET: Regular  BOWEL/BLADDER: Continent  ABNL LAB/BG: N/a  DRAIN/DEVICES: None  TELEMETRY RHYTHM: N/a  SKIN: Dry flaky skin. Small scabs to shins  TESTS/PROCEDURES: N/a  D/C DAY/GOALS/PLACE: Pending placement and guardianship  OTHER IMPORTANT INFO: Door alarm on. Long arm sit present

## 2021-03-23 PROCEDURE — 120N000001 HC R&B MED SURG/OB

## 2021-03-23 PROCEDURE — 250N000013 HC RX MED GY IP 250 OP 250 PS 637: Performed by: INTERNAL MEDICINE

## 2021-03-23 PROCEDURE — 250N000013 HC RX MED GY IP 250 OP 250 PS 637: Performed by: HOSPITALIST

## 2021-03-23 PROCEDURE — 250N000013 HC RX MED GY IP 250 OP 250 PS 637: Performed by: PSYCHIATRY & NEUROLOGY

## 2021-03-23 PROCEDURE — 99231 SBSQ HOSP IP/OBS SF/LOW 25: CPT | Performed by: INTERNAL MEDICINE

## 2021-03-23 PROCEDURE — 87635 SARS-COV-2 COVID-19 AMP PRB: CPT | Performed by: INTERNAL MEDICINE

## 2021-03-23 RX ADMIN — MELATONIN TAB 3 MG 3 MG: 3 TAB at 21:27

## 2021-03-23 RX ADMIN — ATORVASTATIN CALCIUM 80 MG: 40 TABLET, FILM COATED ORAL at 20:28

## 2021-03-23 RX ADMIN — DOCUSATE SODIUM 100 MG: 100 CAPSULE, LIQUID FILLED ORAL at 08:02

## 2021-03-23 RX ADMIN — HALOPERIDOL 10 MG: 5 TABLET ORAL at 20:28

## 2021-03-23 RX ADMIN — LEVOTHYROXINE SODIUM 88 MCG: 88 TABLET ORAL at 08:01

## 2021-03-23 RX ADMIN — LORAZEPAM 1 MG: 1 TABLET ORAL at 21:27

## 2021-03-23 RX ADMIN — SALMETEROL XINAFOATE 1 PUFF: 50 POWDER, METERED ORAL; RESPIRATORY (INHALATION) at 20:33

## 2021-03-23 RX ADMIN — BENZTROPINE MESYLATE 0.5 MG: 0.5 TABLET ORAL at 20:28

## 2021-03-23 RX ADMIN — HALOPERIDOL 10 MG: 5 TABLET ORAL at 08:01

## 2021-03-23 RX ADMIN — SALMETEROL XINAFOATE 1 PUFF: 50 POWDER, METERED ORAL; RESPIRATORY (INHALATION) at 08:04

## 2021-03-23 RX ADMIN — VENLAFAXINE HYDROCHLORIDE 75 MG: 75 CAPSULE, EXTENDED RELEASE ORAL at 08:01

## 2021-03-23 RX ADMIN — BENZTROPINE MESYLATE 0.5 MG: 0.5 TABLET ORAL at 08:01

## 2021-03-23 RX ADMIN — MEMANTINE 5 MG: 5 TABLET ORAL at 08:01

## 2021-03-23 RX ADMIN — DOCUSATE SODIUM 100 MG: 100 CAPSULE, LIQUID FILLED ORAL at 20:28

## 2021-03-23 RX ADMIN — ASPIRIN 81 MG: 81 TABLET, COATED ORAL at 08:01

## 2021-03-23 RX ADMIN — HALOPERIDOL 10 MG: 5 TABLET ORAL at 15:11

## 2021-03-23 ASSESSMENT — ACTIVITIES OF DAILY LIVING (ADL)
ADLS_ACUITY_SCORE: 11

## 2021-03-23 NOTE — PLAN OF CARE
DATE & TIME: 3/22/21 5080-4157  Cognitive Concerns/ Orientation : A&O x 2, disoriented to time and situation- able to be reoriented  BEHAVIOR & AGGRESSION TOOL COLOR: Green     ABNL VS/O2: VSS on RA (Daily vital signs)  MOBILITY: Independent in room  PAIN MANAGMENT: Denies pain  DIET: Regular diet, tolerating, good appetite.   BOWEL/BLADDER: Continent, up to bathroom   ABNL LAB/BG: n/a  DRAIN/DEVICES: n/a  TELEMETRY RHYTHM: n/a  SKIN: Dry flaky skin. Small scabs to shins  TESTS/PROCEDURES: n/a  D/C DAY/GOALS/PLACE: Pending placement and guardianship, SW following.   OTHER IMPORTANT INFO: Has door alarm   Calm and redirectable most of the shift, periods of some restlessness/agitation; TID Haldol-does have PRN Ativan

## 2021-03-23 NOTE — PLAN OF CARE
DATE & TIME: 03/23/2021; 1822-6761  Cognitive Concerns/ Orientation : A&O x 2, disoriented to time and situation- able to be reoriented  BEHAVIOR & AGGRESSION TOOL COLOR: Green     ABNL VS/O2: VSS on RA (Daily vital signs)  MOBILITY: Independent in room  PAIN MANAGMENT: Denies pain  DIET: Regular diet, tolerating, good appetite.   BOWEL/BLADDER: Continent, up to bathroom   ABNL LAB/BG: n/a  DRAIN/DEVICES: n/a  TELEMETRY RHYTHM: n/a  SKIN: Dry flaky skin. Small scabs to shins  TESTS/PROCEDURES: n/a  D/C DAY/GOALS/PLACE: Pending placement and guardianship, SW following.   OTHER IMPORTANT INFO: Has door alarm   Calm and redirectable this shift, can have periods of some restlessness/agitation; TID Haldol -does have PRN Ativan

## 2021-03-23 NOTE — PROGRESS NOTES
[facility]    Medicine Progress Note - Hospitalist Service        Date of Admission:  9/9/2020  6:29 AM    Assessment & Plan:   55 year old male with PMHx of schizophrenia, hx of TBI, alcohol use disorder, COPD, hyperlipidemia and hypothyroidism who was admitted on 9/9/2020 for evaluation of aggressive behavior at his group home.       Neurocognitive disorder dt hx of TBI and alcohol use disorder  Schizophrenia  Under commitment  Had been residing in group home prior to admission.  Brought to hospital for evaluation of aggressive behaviors. Group home now unwilling to take him back. Has had numerous CODE 21s called this stay. Seen by psych, meds adjusted. Is currently under civil commitment and court hold through [facility] until 7/31/21.   -Continues on Haldol 10mg TID (timed for when patient tends to be most agitated), memantine 5mg daily, venlafaxine 75mg daily, benztropine 0.5mg BID   -prns available for agitation (including Haldol, Zyprexa and Ativan)  -SW following for placement     Back cellulitis in 1/2021: Resolved  -Noted on 1/24, initiated on cephalexin at that time. Received local wound care and completed 7-day course of antibiotics on 1/30     Baseline Hypotension  -resolved     Hyperlipidemia  Chronic and stable on atorvastatin     COPD  Not O2 dependent.   -Chronic and stable on salmeterol, albuterol prn     Hypothyroidism  Chronic and stable on levothyroxine     Tobacco use disorder  Using nicotine patch and PRN gum     Resting tremor of upper extremities  No acute issues        Diet: Room Service  Diet  Combination Diet Regular Diet Adult; Safe Tray - with utensils     DVT Prophylaxis: Pneumatic Compression Devices   Valentin Catheter: not present  Code Status: Full Code     Disposition Plan    Expected discharge: Awaiting placement  Entered: Teja Cardona MD 03/23/2021, 12:01 PM        The patient's care was discussed with the Bedside Nurse and Patient.    Teja Cardona  MD  Hospitalist Service  [facility]    ______________________________________________________________________    Interval History   No acute events. No new nursing concerns.     Data reviewed today: I reviewed all medications, new labs and imaging results over the last 24 hours. I personally reviewed no images or EKG's today.    Physical Exam   Vital signs:  Temp: 97.7  F (36.5  C) Temp src: Oral BP: 103/76 Pulse: 79   Resp: 16 SpO2: 99 % O2 Device: None (Room air)   Height: 182.9 cm (6') Weight: 84.4 kg (186 lb)  Estimated body mass index is 25.23 kg/m  as calculated from the following:    Height as of this encounter: 1.829 m (6').    Weight as of this encounter: 84.4 kg (186 lb).      Wt Readings from Last 2 Encounters:   03/19/21 84.4 kg (186 lb)   08/26/20 70.3 kg (155 lb)       Gen: AAOX1-2, NAD  Resp: CTA B/L, normal WOB  CVS: RRR, no murmur  Abd/GI: Soft, non-tender. BS- normoactive.    Neuro- CN- intact. No focal deficits.      Data   No lab results found in last 7 days.    No results found for this or any previous visit (from the past 24 hour(s)).  Medications       aspirin  81 mg Oral Daily     atorvastatin  80 mg Oral At Bedtime     benztropine  0.5 mg Oral BID     docusate sodium  100 mg Oral BID     haloperidol  10 mg Oral TID     levothyroxine  88 mcg Oral Daily     memantine  5 mg Oral Daily     salmeterol  1 puff Inhalation BID     venlafaxine  75 mg Oral Daily with breakfast

## 2021-03-23 NOTE — PLAN OF CARE
DATE & TIME: 03/23/2021; 0700-1930  Cognitive Concerns/ Orientation : A&O x 2, disoriented to time and situation- able to be reoriented  BEHAVIOR & AGGRESSION TOOL COLOR: Green     ABNL VS/O2: VSS on RA (Daily vital signs)  MOBILITY: Independent in room  PAIN MANAGMENT: Denies pain  DIET: Regular diet, tolerating, good appetite.   BOWEL/BLADDER: Continent, up to bathroom   ABNL LAB/BG: n/a  DRAIN/DEVICES: n/a  TELEMETRY RHYTHM: n/a  SKIN: Dry flaky skin. Small scabs to shins  TESTS/PROCEDURES: n/a  D/C DAY/GOALS/PLACE: Pending placement and guardianship, SW following.   OTHER IMPORTANT INFO: Has door alarm    TIDEACON Ellisl -does have PRN Ativan

## 2021-03-24 PROCEDURE — 120N000001 HC R&B MED SURG/OB

## 2021-03-24 PROCEDURE — 99231 SBSQ HOSP IP/OBS SF/LOW 25: CPT | Performed by: INTERNAL MEDICINE

## 2021-03-24 PROCEDURE — 250N000013 HC RX MED GY IP 250 OP 250 PS 637: Performed by: HOSPITALIST

## 2021-03-24 PROCEDURE — 250N000013 HC RX MED GY IP 250 OP 250 PS 637: Performed by: INTERNAL MEDICINE

## 2021-03-24 PROCEDURE — 250N000013 HC RX MED GY IP 250 OP 250 PS 637: Performed by: PSYCHIATRY & NEUROLOGY

## 2021-03-24 RX ADMIN — HALOPERIDOL 10 MG: 5 TABLET ORAL at 14:19

## 2021-03-24 RX ADMIN — DOCUSATE SODIUM 100 MG: 100 CAPSULE, LIQUID FILLED ORAL at 21:21

## 2021-03-24 RX ADMIN — ASPIRIN 81 MG: 81 TABLET, COATED ORAL at 08:08

## 2021-03-24 RX ADMIN — SALMETEROL XINAFOATE 1 PUFF: 50 POWDER, METERED ORAL; RESPIRATORY (INHALATION) at 08:08

## 2021-03-24 RX ADMIN — MEMANTINE 5 MG: 5 TABLET ORAL at 08:08

## 2021-03-24 RX ADMIN — BENZTROPINE MESYLATE 0.5 MG: 0.5 TABLET ORAL at 21:21

## 2021-03-24 RX ADMIN — LORAZEPAM 1 MG: 1 TABLET ORAL at 15:36

## 2021-03-24 RX ADMIN — ATORVASTATIN CALCIUM 80 MG: 40 TABLET, FILM COATED ORAL at 21:20

## 2021-03-24 RX ADMIN — HALOPERIDOL 10 MG: 5 TABLET ORAL at 21:21

## 2021-03-24 RX ADMIN — DOCUSATE SODIUM 100 MG: 100 CAPSULE, LIQUID FILLED ORAL at 08:08

## 2021-03-24 RX ADMIN — SALMETEROL XINAFOATE 1 PUFF: 50 POWDER, METERED ORAL; RESPIRATORY (INHALATION) at 21:25

## 2021-03-24 RX ADMIN — VENLAFAXINE HYDROCHLORIDE 75 MG: 75 CAPSULE, EXTENDED RELEASE ORAL at 08:07

## 2021-03-24 RX ADMIN — BENZTROPINE MESYLATE 0.5 MG: 0.5 TABLET ORAL at 08:08

## 2021-03-24 RX ADMIN — HALOPERIDOL 10 MG: 5 TABLET ORAL at 08:07

## 2021-03-24 RX ADMIN — LEVOTHYROXINE SODIUM 88 MCG: 88 TABLET ORAL at 08:08

## 2021-03-24 ASSESSMENT — ACTIVITIES OF DAILY LIVING (ADL)
ADLS_ACUITY_SCORE: 11

## 2021-03-24 NOTE — PLAN OF CARE
DATE & TIME: 03/23/2021 9345-6854  Cognitive Concerns/ Orientation : A&O x 2, disoriented to time and situation. Whispers, quiet voice. Flat affect. Restless/anxious in evening, PRN PO Ativan given x1. Scheduled TID Haldol  BEHAVIOR & AGGRESSION TOOL COLOR: Green, hx yellow  ABNL VS/O2: No vital signs taken overnight, daily vitals order  MOBILITY: Independent in room, door alarm in use  PAIN MANAGMENT: Denies pain  DIET: Regular  BOWEL/BLADDER: Continent, up to BR  ABNL LAB/BG: None  DRAIN/DEVICES: None  SKIN: Scattered scabbing and bruising, dry  TESTS/PROCEDURES: None scheduled  D/C DAY/GOALS/PLACE: Pending placement and guardianship, SW following  OTHER IMPORTANT INFO: Pt is under civil commitment and court hold until 7/31/21.

## 2021-03-24 NOTE — PLAN OF CARE
ATE & TIME: 03/24/2021 8324-5310  Cognitive Concerns/ Orientation : A&O x 2, disoriented to time and situation. Whispers, . Flat affect.   BEHAVIOR & AGGRESSION TOOL COLOR: Green,   ABNL VS/O2:VSS, on Ra  MOBILITY: Independent in room, door alarm in use  PAIN MANAGMENT: Denies pain  DIET: Regular  BOWEL/BLADDER: Continent, up to BR  ABNL LAB/BG: None  DRAIN/DEVICES: None  SKIN: Scattered scabbing and bruising,   TESTS/PROCEDURES: None scheduled  D/C DAY/GOALS/PLACE: Pending placement and guardianship, SW following  OTHER IMPORTANT INFO: Pt is under civil commitment and court hold until 7/31/21.

## 2021-03-24 NOTE — PROGRESS NOTES
[facility]    Medicine Progress Note - Hospitalist Service        Date of Admission:  9/9/2020  6:29 AM    Assessment & Plan:   55 year old male with PMHx of schizophrenia, hx of TBI, alcohol use disorder, COPD, hyperlipidemia and hypothyroidism who was admitted on 9/9/2020 for evaluation of aggressive behavior at his group home.       Neurocognitive disorder dt hx of TBI and alcohol use disorder  Schizophrenia  Under commitment  Had been residing in group home prior to admission.  Brought to hospital for evaluation of aggressive behaviors. Group home now unwilling to take him back. Has had numerous CODE 21s called this stay. Seen by psych, meds adjusted. Is currently under civil commitment and court hold through [facility] until 7/31/21.   -Continues on Haldol 10mg TID (timed for when patient tends to be most agitated), memantine 5mg daily, venlafaxine 75mg daily, benztropine 0.5mg BID   -prns available for agitation (including Haldol, Zyprexa and Ativan)  -SW following for placement     Back cellulitis in 1/2021: Resolved  -Noted on 1/24, initiated on cephalexin at that time. Received local wound care and completed 7-day course of antibiotics on 1/30     Baseline Hypotension  -resolved     Hyperlipidemia  Chronic and stable on atorvastatin     COPD  Not O2 dependent.   -Chronic and stable on salmeterol, albuterol prn     Hypothyroidism  Chronic and stable on levothyroxine     Tobacco use disorder  Using nicotine patch and PRN gum     Resting tremor of upper extremities  No acute issues        Diet: Room Service  Diet  Combination Diet Regular Diet Adult; Safe Tray - with utensils     DVT Prophylaxis: Pneumatic Compression Devices   Valentin Catheter: not present  Code Status: Full Code     Disposition Plan    Expected discharge: Awaiting placement  Entered: Teja Cardona MD 03/24/2021, 12:19 PM        The patient's care was discussed with the Bedside Nurse and Patient.    Teja Cardona  MD  Hospitalist Service  [facility]    ______________________________________________________________________    Interval History   No new concerns. Sleeping; wants to be left alone.     Data reviewed today: I reviewed all medications, new labs and imaging results over the last 24 hours. I personally reviewed no images or EKG's today.    Physical Exam   Vital signs:  Temp: 98.3  F (36.8  C) Temp src: Oral BP: 103/67 Pulse: 68   Resp: 16 SpO2: 99 % O2 Device: None (Room air)   Height: 182.9 cm (6') Weight: 84.4 kg (186 lb)  Estimated body mass index is 25.23 kg/m  as calculated from the following:    Height as of this encounter: 1.829 m (6').    Weight as of this encounter: 84.4 kg (186 lb).      Wt Readings from Last 2 Encounters:   03/19/21 84.4 kg (186 lb)   08/26/20 70.3 kg (155 lb)       Gen: AAOX1-2, NAD  Resp: CTA B/L, normal WOB  CVS: RRR, no murmur  Abd/GI: Soft, non-tender. BS- normoactive.    Neuro- CN- intact. No focal deficits.      Data   No lab results found in last 7 days.    No results found for this or any previous visit (from the past 24 hour(s)).  Medications       aspirin  81 mg Oral Daily     atorvastatin  80 mg Oral At Bedtime     benztropine  0.5 mg Oral BID     docusate sodium  100 mg Oral BID     haloperidol  10 mg Oral TID     levothyroxine  88 mcg Oral Daily     memantine  5 mg Oral Daily     salmeterol  1 puff Inhalation BID     venlafaxine  75 mg Oral Daily with breakfast

## 2021-03-25 PROCEDURE — 250N000013 HC RX MED GY IP 250 OP 250 PS 637: Performed by: INTERNAL MEDICINE

## 2021-03-25 PROCEDURE — 99231 SBSQ HOSP IP/OBS SF/LOW 25: CPT | Performed by: HOSPITALIST

## 2021-03-25 PROCEDURE — 250N000013 HC RX MED GY IP 250 OP 250 PS 637: Performed by: HOSPITALIST

## 2021-03-25 PROCEDURE — 120N000001 HC R&B MED SURG/OB

## 2021-03-25 PROCEDURE — 250N000013 HC RX MED GY IP 250 OP 250 PS 637: Performed by: PSYCHIATRY & NEUROLOGY

## 2021-03-25 RX ADMIN — SALMETEROL XINAFOATE 1 PUFF: 50 POWDER, METERED ORAL; RESPIRATORY (INHALATION) at 20:49

## 2021-03-25 RX ADMIN — ATORVASTATIN CALCIUM 80 MG: 40 TABLET, FILM COATED ORAL at 20:48

## 2021-03-25 RX ADMIN — HALOPERIDOL 10 MG: 5 TABLET ORAL at 15:29

## 2021-03-25 RX ADMIN — LORAZEPAM 1 MG: 1 TABLET ORAL at 12:15

## 2021-03-25 RX ADMIN — MEMANTINE 5 MG: 5 TABLET ORAL at 08:42

## 2021-03-25 RX ADMIN — HALOPERIDOL 10 MG: 5 TABLET ORAL at 08:42

## 2021-03-25 RX ADMIN — LEVOTHYROXINE SODIUM 88 MCG: 88 TABLET ORAL at 08:42

## 2021-03-25 RX ADMIN — HALOPERIDOL 10 MG: 5 TABLET ORAL at 20:48

## 2021-03-25 RX ADMIN — DOCUSATE SODIUM 100 MG: 100 CAPSULE, LIQUID FILLED ORAL at 08:42

## 2021-03-25 RX ADMIN — ASPIRIN 81 MG: 81 TABLET, COATED ORAL at 08:42

## 2021-03-25 RX ADMIN — DOCUSATE SODIUM 100 MG: 100 CAPSULE, LIQUID FILLED ORAL at 20:48

## 2021-03-25 RX ADMIN — BENZTROPINE MESYLATE 0.5 MG: 0.5 TABLET ORAL at 20:48

## 2021-03-25 RX ADMIN — VENLAFAXINE HYDROCHLORIDE 75 MG: 75 CAPSULE, EXTENDED RELEASE ORAL at 08:42

## 2021-03-25 RX ADMIN — BENZTROPINE MESYLATE 0.5 MG: 0.5 TABLET ORAL at 08:42

## 2021-03-25 RX ADMIN — SALMETEROL XINAFOATE 1 PUFF: 50 POWDER, METERED ORAL; RESPIRATORY (INHALATION) at 09:32

## 2021-03-25 ASSESSMENT — ACTIVITIES OF DAILY LIVING (ADL)
ADLS_ACUITY_SCORE: 11

## 2021-03-25 NOTE — PROGRESS NOTES
[facility]    Medicine Progress Note - Hospitalist Service        Date of Admission:  9/9/2020  6:29 AM    Assessment & Plan:   55 year old male with PMHx of schizophrenia, hx of TBI, alcohol use disorder, COPD, hyperlipidemia and hypothyroidism who was admitted on 9/9/2020 for evaluation of aggressive behavior at his group home.       Neurocognitive disorder dt hx of TBI and alcohol use disorder  Schizophrenia  Under commitment  Had been residing in group home prior to admission.  Brought to hospital for evaluation of aggressive behaviors. Group home now unwilling to take him back. Has had numerous CODE 21s called this stay. Seen by psych, meds adjusted. Is currently under civil commitment and court hold through [facility] until 7/31/21.   -Continues on Haldol 10mg TID (timed for when patient tends to be most agitated), memantine 5mg daily, venlafaxine 75mg daily, benztropine 0.5mg BID   -prns available for agitation (including Haldol, Zyprexa and Ativan)  -SW following for placement     Back cellulitis in 1/2021: Resolved  -Noted on 1/24, initiated on cephalexin at that time. Received local wound care and completed 7-day course of antibiotics on 1/30     Baseline Hypotension  -resolved     Hyperlipidemia  Chronic and stable on atorvastatin     COPD  Not O2 dependent.   -Chronic and stable on salmeterol, albuterol prn     Hypothyroidism  Chronic and stable on levothyroxine     Tobacco use disorder  Using nicotine patch and PRN gum     Resting tremor of upper extremities  No acute issues        Diet: Room Service  Diet  Combination Diet Regular Diet Adult; Safe Tray - with utensils     DVT Prophylaxis: Pneumatic Compression Devices   Valentin Catheter: not present  Code Status: Full Code     Disposition Plan    Expected discharge: Awaiting placement  Entered: Sushil Genao MD 03/25/2021, 4:27 PM        The patient's care was discussed with the Bedside Nurse and Patient.    Sushil Genao  MD  Hospitalist Service  [facility]    ______________________________________________________________________    Interval History   Denies any pain or dyspnea, offers no other complaints other than wanting something to drink.     Data reviewed today: I reviewed all medications, new labs and imaging results over the last 24 hours. I personally reviewed no images or EKG's today.    Physical Exam   Vital signs:  Temp: 97.5  F (36.4  C) Temp src: Oral BP: 101/75 Pulse: 64   Resp: 16 SpO2: 97 % O2 Device: None (Room air)   Height: 182.9 cm (6') Weight: 84.4 kg (186 lb)  Estimated body mass index is 25.23 kg/m  as calculated from the following:    Height as of this encounter: 1.829 m (6').    Weight as of this encounter: 84.4 kg (186 lb).      Wt Readings from Last 2 Encounters:   03/19/21 84.4 kg (186 lb)   08/26/20 70.3 kg (155 lb)       Gen: well nourished male in NAD  Resp: CTA B/L, normal WOB  CVS: RRR, normal s1/s2, no murmur  Abd/GI: Soft, normal bowel sounds   Neuro- oriented to self only, cranial nerves grossly intact      Data   No lab results found in last 7 days.    No results found for this or any previous visit (from the past 24 hour(s)).  Medications       aspirin  81 mg Oral Daily     atorvastatin  80 mg Oral At Bedtime     benztropine  0.5 mg Oral BID     docusate sodium  100 mg Oral BID     haloperidol  10 mg Oral TID     levothyroxine  88 mcg Oral Daily     memantine  5 mg Oral Daily     salmeterol  1 puff Inhalation BID     venlafaxine  75 mg Oral Daily with breakfast

## 2021-03-25 NOTE — PLAN OF CARE
DATE & TIME: 03/24/2021 [id number]  Cognitive Concerns/ Orientation : A&Ox2, disoriented to time and situation. Whispers. Flat affect.   BEHAVIOR & AGGRESSION TOOL COLOR: Green, hx yellow  ABNL VS/O2: VSS on RA, O2 sats 98%  MOBILITY: Independent in room, door alarm in use  PAIN MANAGMENT: Denies  DIET: Regular, tolerating  BOWEL/BLADDER: Continent, up to BR  ABNL LAB/BG: None  DRAIN/DEVICES: None  SKIN: Scattered scabbing and bruising, dry  TESTS/PROCEDURES: N/A  D/C DAY/GOALS/PLACE: Pending placement and guardianship, SW following  OTHER IMPORTANT INFO: Pt is under civil commitment and court hold until 7/31/21. PRN Ativan x1 and scheduled Haldol x1 for agitation and hallucinations. Pleasantly redirectable.

## 2021-03-25 NOTE — PLAN OF CARE
DATE & TIME: 03/25/2021; [id number]  Cognitive Concerns/ Orientation : A&Ox2, disoriented to time and situation. Whispers. Flat affect.   BEHAVIOR & AGGRESSION TOOL COLOR: Green, hx yellow; PRN Ativan given X1 for anxiety.  ABNL VS/O2: VSS on RA, O2 sats 96%  MOBILITY: Independent in room, door alarm on  PAIN MANAGMENT: Denies; pt slept for most of the day.   DIET: Regular, tolerating  BOWEL/BLADDER: Continent, up to BR  ABNL LAB/BG: None  DRAIN/DEVICES: None  SKIN: Scattered scabbing and bruising, dry  TESTS/PROCEDURES: N/A  D/C DAY/GOALS/PLACE: Pending placement and guardianship, SW following  OTHER IMPORTANT INFO: Pt is under civil commitment and court hold until 7/31/21. Slept well overnight. Pleasantly redirectable.

## 2021-03-25 NOTE — PLAN OF CARE
DATE & TIME: 03/24/2021 7914-3337  Cognitive Concerns/ Orientation : A&Ox2, disoriented to time and situation. Whispers. Flat affect.   BEHAVIOR & AGGRESSION TOOL COLOR: Green, hx yellow  ABNL VS/O2: VSS on RA, O2 sats 98%  MOBILITY: Independent in room, door alarm in use  PAIN MANAGMENT: Denies  DIET: Regular, tolerating  BOWEL/BLADDER: Continent, up to BR  ABNL LAB/BG: None  DRAIN/DEVICES: None  SKIN: Scattered scabbing and bruising, dry  TESTS/PROCEDURES: N/A  D/C DAY/GOALS/PLACE: Pending placement and guardianship, SW following  OTHER IMPORTANT INFO: Pt is under civil commitment and court hold until 7/31/21. Slept well overnight. Pleasantly redirectable.

## 2021-03-25 NOTE — PLAN OF CARE
DATE & TIME: 03/25/2021; 5919-1503  Cognitive Concerns/ Orientation : A&Ox2, disoriented to time and situation. Whispers. Flat affect.   BEHAVIOR & AGGRESSION TOOL COLOR: Green, hx yellow; PRN Ativan given X1 for anxiety.  ABNL VS/O2: VSS on RA, O2 sats 96%  MOBILITY: Independent in room, door alarm on  PAIN MANAGMENT: Denies; pt slept for most of the day.   DIET: Regular, tolerating  BOWEL/BLADDER: Continent, up to BR  ABNL LAB/BG: None  DRAIN/DEVICES: None  SKIN: Scattered scabbing and bruising, dry  TESTS/PROCEDURES: N/A  D/C DAY/GOALS/PLACE: Pending placement and guardianship, SW following  OTHER IMPORTANT INFO: Pt is under civil commitment and court hold until 7/31/21. Slept well overnight. Pleasantly redirectable.

## 2021-03-26 PROCEDURE — 120N000001 HC R&B MED SURG/OB

## 2021-03-26 PROCEDURE — 250N000013 HC RX MED GY IP 250 OP 250 PS 637: Performed by: INTERNAL MEDICINE

## 2021-03-26 PROCEDURE — 250N000013 HC RX MED GY IP 250 OP 250 PS 637: Performed by: HOSPITALIST

## 2021-03-26 PROCEDURE — 250N000013 HC RX MED GY IP 250 OP 250 PS 637: Performed by: NURSE PRACTITIONER

## 2021-03-26 PROCEDURE — 99231 SBSQ HOSP IP/OBS SF/LOW 25: CPT | Performed by: HOSPITALIST

## 2021-03-26 RX ADMIN — BENZTROPINE MESYLATE 0.5 MG: 0.5 TABLET ORAL at 20:42

## 2021-03-26 RX ADMIN — ASPIRIN 81 MG: 81 TABLET, COATED ORAL at 09:15

## 2021-03-26 RX ADMIN — ATORVASTATIN CALCIUM 80 MG: 40 TABLET, FILM COATED ORAL at 20:42

## 2021-03-26 RX ADMIN — MEMANTINE 5 MG: 5 TABLET ORAL at 09:15

## 2021-03-26 RX ADMIN — SALMETEROL XINAFOATE 1 PUFF: 50 POWDER, METERED ORAL; RESPIRATORY (INHALATION) at 20:46

## 2021-03-26 RX ADMIN — BENZTROPINE MESYLATE 0.5 MG: 0.5 TABLET ORAL at 09:15

## 2021-03-26 RX ADMIN — NICOTINE POLACRILEX 2 MG: 2 GUM, CHEWING ORAL at 18:33

## 2021-03-26 RX ADMIN — DOCUSATE SODIUM 100 MG: 100 CAPSULE, LIQUID FILLED ORAL at 20:42

## 2021-03-26 RX ADMIN — SALMETEROL XINAFOATE 1 PUFF: 50 POWDER, METERED ORAL; RESPIRATORY (INHALATION) at 09:20

## 2021-03-26 RX ADMIN — VENLAFAXINE HYDROCHLORIDE 75 MG: 75 CAPSULE, EXTENDED RELEASE ORAL at 09:15

## 2021-03-26 RX ADMIN — NICOTINE POLACRILEX 2 MG: 2 GUM, CHEWING ORAL at 14:54

## 2021-03-26 RX ADMIN — HALOPERIDOL 10 MG: 5 TABLET ORAL at 20:42

## 2021-03-26 RX ADMIN — LEVOTHYROXINE SODIUM 88 MCG: 88 TABLET ORAL at 09:16

## 2021-03-26 RX ADMIN — HALOPERIDOL 10 MG: 5 TABLET ORAL at 09:15

## 2021-03-26 RX ADMIN — HALOPERIDOL 10 MG: 5 TABLET ORAL at 14:48

## 2021-03-26 RX ADMIN — DOCUSATE SODIUM 100 MG: 100 CAPSULE, LIQUID FILLED ORAL at 09:15

## 2021-03-26 ASSESSMENT — ACTIVITIES OF DAILY LIVING (ADL)
ADLS_ACUITY_SCORE: 11

## 2021-03-26 NOTE — PLAN OF CARE
DATE & TIME: 03/25/2021 1900-0730  Cognitive Concerns/ Orientation : A&Ox3, disoriented to situation. Whispers. Flat affect.   BEHAVIOR & AGGRESSION TOOL COLOR: Green (hx yellow)  ABNL VS/O2: No vitals taken this shift due to daily vitals order.   MOBILITY: Independent in room, door alarm on  PAIN MANAGMENT: Denies  DIET: Regular, tolerating  BOWEL/BLADDER: Continent, up to BR  ABNL LAB/BG: None  DRAIN/DEVICES: None  SKIN: Scattered scabbing and bruising, dry.  TESTS/PROCEDURES: NA  D/C DAY/GOALS/PLACE: Pending placement and guardianship, SW following  OTHER IMPORTANT INFO: Pt is under civil commitment and court hold until 7/31/21. Slept well overnight. Pleasantly redirectable.

## 2021-03-26 NOTE — PLAN OF CARE
"DATE & TIME: 3/26/2021 3516-5956   Cognitive Concerns/ Orientation : Alert to self.    BEHAVIOR & AGGRESSION TOOL COLOR: Green to yellow. Pt got agitated when writer asked him to repeat what he said, \"I said I was not Fucking in pain\". Writer resumed passing medication to pt. Pt did not have further esclation of behavior. Pt did come out of room several times, asking for various of things, including his room number, telling writer that someone was going to pick him up soon, and \"when the fuck am in going to get out of here\".   CIWA SCORE: na    ABNL VS/O2: vss, on RA  MOBILITY: IND in room.   PAIN MANAGMENT: denied pain.   DIET: regular.   BOWEL/BLADDER: continent.   ABNL LAB/BG: na   DRAIN/DEVICES: no IV access.   TELEMETRY RHYTHM: na   SKIN: intact.  TESTS/PROCEDURES: na   D/C DAY/GOALS/PLACE: pending placement and guardianship  OTHER IMPORTANT INFO: elisabet SANCHEZ/RN ROUNDING SIGNED OFF D/E SHIFT: na   COMMIT TO SIT DONE AND SIGNED OFF na   IS THE PATIENT ON REMDESIVIR? DATE OF LAST SCHEDULED DOSE: na      "

## 2021-03-26 NOTE — PROGRESS NOTES
[facility]    Medicine Progress Note - Hospitalist Service        Date of Admission:  9/9/2020  6:29 AM    Assessment & Plan:   55 year old male with PMHx of schizophrenia, hx of TBI, alcohol use disorder, COPD, hyperlipidemia and hypothyroidism who was admitted on 9/9/2020 for evaluation of aggressive behavior at his group home.       Neurocognitive disorder dt hx of TBI and alcohol use disorder  Schizophrenia  Under commitment  Had been residing in group home prior to admission.  Brought to hospital for evaluation of aggressive behaviors. Group home now unwilling to take him back. Has had numerous CODE 21s called this stay. Seen by psych, meds adjusted. Is currently under civil commitment and court hold through [facility] until 7/31/21.   -Continues on Haldol 10mg TID (timed for when patient tends to be most agitated), memantine 5mg daily, venlafaxine 75mg daily, benztropine 0.5mg BID   -prns available for agitation (including Haldol, Zyprexa and Ativan)  -SW following for placement     Back cellulitis in 1/2021: Resolved  -Noted on 1/24, initiated on cephalexin at that time. Received local wound care and completed 7-day course of antibiotics on 1/30     Baseline Hypotension  -resolved     Hyperlipidemia  Chronic and stable on atorvastatin     COPD  Not O2 dependent.   -Chronic and stable on salmeterol, albuterol prn     Hypothyroidism  Chronic and stable on levothyroxine     Tobacco use disorder  Using nicotine patch and PRN gum     Resting tremor of upper extremities  No acute issues        Diet: Room Service  Diet  Combination Diet Regular Diet Adult; Safe Tray - with utensils     DVT Prophylaxis: Pneumatic Compression Devices   Valentin Catheter: not present  Code Status: Full Code     Disposition Plan    Expected discharge: Awaiting placement  Entered: Sushil Genao MD 03/26/2021, 3:46 PM        The patient's care was discussed with the Bedside Nurse and Patient.    Sushil Genao  MD  Hospitalist Service  [facility]    ______________________________________________________________________    Interval History   Is again without complaint, denies pain or dyspnea.  No concerns per RN.     Data reviewed today: I reviewed all medications, new labs and imaging results over the last 24 hours. I personally reviewed no images or EKG's today.    Physical Exam   Vital signs:  Temp: 97.5  F (36.4  C) Temp src: Oral BP: 118/78 Pulse: 68   Resp: 16 SpO2: 97 % O2 Device: None (Room air)   Height: 182.9 cm (6') Weight: 84.4 kg (186 lb)  Estimated body mass index is 25.23 kg/m  as calculated from the following:    Height as of this encounter: 1.829 m (6').    Weight as of this encounter: 84.4 kg (186 lb).      Wt Readings from Last 2 Encounters:   03/19/21 84.4 kg (186 lb)   08/26/20 70.3 kg (155 lb)       Gen: well nourished male in NAD, sitting at edge of bed  Resp: CTA B/L, normal WOB  CVS: RRR, normal s1/s2, no murmur  Abd/GI:   Neuro- oriented to self only, cranial nerves grossly intact      Data   No lab results found in last 7 days.    No results found for this or any previous visit (from the past 24 hour(s)).  Medications       aspirin  81 mg Oral Daily     atorvastatin  80 mg Oral At Bedtime     benztropine  0.5 mg Oral BID     docusate sodium  100 mg Oral BID     haloperidol  10 mg Oral TID     levothyroxine  88 mcg Oral Daily     memantine  5 mg Oral Daily     salmeterol  1 puff Inhalation BID     venlafaxine  75 mg Oral Daily with breakfast

## 2021-03-27 PROCEDURE — 120N000001 HC R&B MED SURG/OB

## 2021-03-27 PROCEDURE — 250N000013 HC RX MED GY IP 250 OP 250 PS 637: Performed by: HOSPITALIST

## 2021-03-27 PROCEDURE — 250N000013 HC RX MED GY IP 250 OP 250 PS 637: Performed by: INTERNAL MEDICINE

## 2021-03-27 PROCEDURE — 99231 SBSQ HOSP IP/OBS SF/LOW 25: CPT | Performed by: HOSPITALIST

## 2021-03-27 RX ADMIN — HALOPERIDOL 10 MG: 5 TABLET ORAL at 20:47

## 2021-03-27 RX ADMIN — ATORVASTATIN CALCIUM 80 MG: 40 TABLET, FILM COATED ORAL at 20:48

## 2021-03-27 RX ADMIN — HALOPERIDOL 10 MG: 5 TABLET ORAL at 07:55

## 2021-03-27 RX ADMIN — DOCUSATE SODIUM 100 MG: 100 CAPSULE, LIQUID FILLED ORAL at 20:48

## 2021-03-27 RX ADMIN — MEMANTINE 5 MG: 5 TABLET ORAL at 07:55

## 2021-03-27 RX ADMIN — BENZTROPINE MESYLATE 0.5 MG: 0.5 TABLET ORAL at 07:55

## 2021-03-27 RX ADMIN — LEVOTHYROXINE SODIUM 88 MCG: 88 TABLET ORAL at 07:55

## 2021-03-27 RX ADMIN — DOCUSATE SODIUM 100 MG: 100 CAPSULE, LIQUID FILLED ORAL at 07:55

## 2021-03-27 RX ADMIN — BENZTROPINE MESYLATE 0.5 MG: 0.5 TABLET ORAL at 20:48

## 2021-03-27 RX ADMIN — SALMETEROL XINAFOATE 1 PUFF: 50 POWDER, METERED ORAL; RESPIRATORY (INHALATION) at 07:55

## 2021-03-27 RX ADMIN — VENLAFAXINE HYDROCHLORIDE 75 MG: 75 CAPSULE, EXTENDED RELEASE ORAL at 07:55

## 2021-03-27 RX ADMIN — ASPIRIN 81 MG: 81 TABLET, COATED ORAL at 07:55

## 2021-03-27 RX ADMIN — HALOPERIDOL 10 MG: 5 TABLET ORAL at 14:51

## 2021-03-27 ASSESSMENT — ACTIVITIES OF DAILY LIVING (ADL)
ADLS_ACUITY_SCORE: 11

## 2021-03-27 NOTE — PLAN OF CARE
RN [id number]  Cognitive Concerns/ Orientation : A&Ox3, disoriented to situation. Whispers. Flat affect.   BEHAVIOR & AGGRESSION TOOL COLOR: Green (hx yellow)  ABNL VS/O2: No vitals taken this shift due to daily vitals order.   MOBILITY: Independent in room, door alarm on  PAIN MANAGMENT: Denies  DIET: Regular, tolerating  BOWEL/BLADDER: Continent, up to BR  ABNL LAB/BG: None  DRAIN/DEVICES: None  SKIN: Scattered scabbing and bruising, dry.  TESTS/PROCEDURES: NA  D/C DAY/GOALS/PLACE: Pending placement and guardianship, SW following  OTHER IMPORTANT INFO: Pt is under civil commitment and court hold until 7/31/21. Pleasantly redirectable.

## 2021-03-27 NOTE — PLAN OF CARE
"DATE & TIME: 3/27/2021 night shift  Cognitive Concerns/ Orientation : Alert to self.    BEHAVIOR & AGGRESSION TOOL COLOR: Green. Writer approached the pt overnight few times; pt was cooperative and understanding.   CIWA SCORE: na      ABNL VS/O2:VS done daily.   MOBILITY: IND in room. SBA in the cruz.   PAIN MANAGMENT: denied pain.   DIET: regular. Drinking pop and eating crackers overnight.   BOWEL/BLADDER: continent.   ABNL LAB/BG: na   DRAIN/DEVICES: no IV access.   TELEMETRY RHYTHM: na   SKIN: intact, however full skin check was not done as so pt does not get escalated.   TESTS/PROCEDURES: na   D/C DAY/GOALS/PLACE: pending placement and guardianship. DAVINA is following. Last note per DAVINA 3/22 says \"Writer has paged Temo Blair, , for an update, asking if  patient's sister has agreed to be the guardian for patient.\"  OTHER IMPORTANT INFO: na   "

## 2021-03-27 NOTE — PROGRESS NOTES
[facility]    Medicine Progress Note - Hospitalist Service        Date of Admission:  9/9/2020  6:29 AM    Assessment & Plan:   55 year old male with PMHx of schizophrenia, hx of TBI, alcohol use disorder, COPD, hyperlipidemia and hypothyroidism who was admitted on 9/9/2020 for evaluation of aggressive behavior at his group home.       Neurocognitive disorder dt hx of TBI and alcohol use disorder  Schizophrenia  Under commitment  Had been residing in group home prior to admission.  Brought to hospital for evaluation of aggressive behaviors. Group home now unwilling to take him back. Has had numerous CODE 21s called this stay. Seen by psych, meds adjusted. Is currently under civil commitment and court hold through [facility] until 7/31/21.   -Continues on Haldol 10mg TID (timed for when patient tends to be most agitated), memantine 5mg daily, venlafaxine 75mg daily, benztropine 0.5mg BID   -prns available for agitation (including Haldol, Zyprexa and Ativan)  -SW following for placement     Back cellulitis in 1/2021: Resolved  -Noted on 1/24, initiated on cephalexin at that time. Received local wound care and completed 7-day course of antibiotics on 1/30     Baseline Hypotension  -resolved     Hyperlipidemia  Chronic and stable on atorvastatin     COPD  Not O2 dependent.   -Chronic and stable on salmeterol, albuterol prn     Hypothyroidism  Chronic and stable on levothyroxine     Tobacco use disorder  Using nicotine patch and PRN gum     Resting tremor of upper extremities  No acute issues        Diet: Room Service  Diet  Combination Diet Regular Diet Adult; Safe Tray - with utensils     DVT Prophylaxis: Pneumatic Compression Devices   Valentin Catheter: not present  Code Status: Full Code     Disposition Plan    Expected discharge: Awaiting placement  Entered: Sushil Genao MD 03/27/2021, 4:28 PM        The patient's care was discussed with the Bedside Nurse and Patient.    Sushil Genao  MD  Hospitalist Service  [facility]    ______________________________________________________________________    Interval History   Feeling well, offers no complaints.  No concerns per RN.     Data reviewed today: I reviewed all medications, new labs and imaging results over the last 24 hours. I personally reviewed no images or EKG's today.    Physical Exam   Vital signs:  Temp: 97.4  F (36.3  C) Temp src: Axillary BP: 103/75 Pulse: 77   Resp: 16 SpO2: 94 % O2 Device: None (Room air)   Height: 182.9 cm (6') Weight: 84.4 kg (186 lb)  Estimated body mass index is 25.23 kg/m  as calculated from the following:    Height as of this encounter: 1.829 m (6').    Weight as of this encounter: 84.4 kg (186 lb).      Wt Readings from Last 2 Encounters:   03/19/21 84.4 kg (186 lb)   08/26/20 70.3 kg (155 lb)       Gen: well nourished male in NAD, lying in bed  Resp: respirations non-labored on room air  CVS:   Abd/GI:   Neuro- awake and alert, orientation not assess, cranial nerves grossly intact      Data   No lab results found in last 7 days.    No results found for this or any previous visit (from the past 24 hour(s)).  Medications       aspirin  81 mg Oral Daily     atorvastatin  80 mg Oral At Bedtime     benztropine  0.5 mg Oral BID     docusate sodium  100 mg Oral BID     haloperidol  10 mg Oral TID     levothyroxine  88 mcg Oral Daily     memantine  5 mg Oral Daily     salmeterol  1 puff Inhalation BID     venlafaxine  75 mg Oral Daily with breakfast

## 2021-03-28 PROCEDURE — 250N000013 HC RX MED GY IP 250 OP 250 PS 637: Performed by: HOSPITALIST

## 2021-03-28 PROCEDURE — 99231 SBSQ HOSP IP/OBS SF/LOW 25: CPT | Performed by: HOSPITALIST

## 2021-03-28 PROCEDURE — 250N000013 HC RX MED GY IP 250 OP 250 PS 637: Performed by: PSYCHIATRY & NEUROLOGY

## 2021-03-28 PROCEDURE — 250N000013 HC RX MED GY IP 250 OP 250 PS 637: Performed by: INTERNAL MEDICINE

## 2021-03-28 PROCEDURE — 250N000013 HC RX MED GY IP 250 OP 250 PS 637: Performed by: NURSE PRACTITIONER

## 2021-03-28 PROCEDURE — 120N000001 HC R&B MED SURG/OB

## 2021-03-28 RX ADMIN — DOCUSATE SODIUM 100 MG: 100 CAPSULE, LIQUID FILLED ORAL at 21:35

## 2021-03-28 RX ADMIN — SALMETEROL XINAFOATE 1 PUFF: 50 POWDER, METERED ORAL; RESPIRATORY (INHALATION) at 07:44

## 2021-03-28 RX ADMIN — BENZTROPINE MESYLATE 0.5 MG: 0.5 TABLET ORAL at 07:45

## 2021-03-28 RX ADMIN — VENLAFAXINE HYDROCHLORIDE 75 MG: 75 CAPSULE, EXTENDED RELEASE ORAL at 07:44

## 2021-03-28 RX ADMIN — BENZTROPINE MESYLATE 0.5 MG: 0.5 TABLET ORAL at 21:35

## 2021-03-28 RX ADMIN — LEVOTHYROXINE SODIUM 88 MCG: 88 TABLET ORAL at 07:44

## 2021-03-28 RX ADMIN — MEMANTINE 5 MG: 5 TABLET ORAL at 07:45

## 2021-03-28 RX ADMIN — OLANZAPINE 10 MG: 5 TABLET, ORALLY DISINTEGRATING ORAL at 11:40

## 2021-03-28 RX ADMIN — HALOPERIDOL 10 MG: 5 TABLET ORAL at 21:35

## 2021-03-28 RX ADMIN — HALOPERIDOL 10 MG: 5 TABLET ORAL at 16:24

## 2021-03-28 RX ADMIN — SALMETEROL XINAFOATE 1 PUFF: 50 POWDER, METERED ORAL; RESPIRATORY (INHALATION) at 21:35

## 2021-03-28 RX ADMIN — NICOTINE POLACRILEX 2 MG: 2 GUM, CHEWING ORAL at 12:48

## 2021-03-28 RX ADMIN — LORAZEPAM 1 MG: 1 TABLET ORAL at 12:48

## 2021-03-28 RX ADMIN — ASPIRIN 81 MG: 81 TABLET, COATED ORAL at 07:44

## 2021-03-28 RX ADMIN — DOCUSATE SODIUM 100 MG: 100 CAPSULE, LIQUID FILLED ORAL at 07:45

## 2021-03-28 RX ADMIN — NICOTINE POLACRILEX 2 MG: 2 GUM, CHEWING ORAL at 11:57

## 2021-03-28 RX ADMIN — HALOPERIDOL 10 MG: 5 TABLET ORAL at 07:44

## 2021-03-28 RX ADMIN — ATORVASTATIN CALCIUM 80 MG: 40 TABLET, FILM COATED ORAL at 21:35

## 2021-03-28 ASSESSMENT — ACTIVITIES OF DAILY LIVING (ADL)
ADLS_ACUITY_SCORE: 11

## 2021-03-28 NOTE — PROGRESS NOTES
[facility]    Medicine Progress Note - Hospitalist Service        Date of Admission:  9/9/2020  6:29 AM    Assessment & Plan:   55 year old male with PMHx of schizophrenia, hx of TBI, alcohol use disorder, COPD, hyperlipidemia and hypothyroidism who was admitted on 9/9/2020 for evaluation of aggressive behavior at his group home.       Neurocognitive disorder dt hx of TBI and alcohol use disorder  Schizophrenia  Under commitment  Had been residing in group home prior to admission.  Brought to hospital for evaluation of aggressive behaviors. Group home now unwilling to take him back. Has had numerous CODE 21s called this stay. Seen by psych, meds adjusted. Is currently under civil commitment and court hold through [facility] until 7/31/21.   -Continues on Haldol 10mg TID (timed for when patient tends to be most agitated), memantine 5mg daily, venlafaxine 75mg daily, benztropine 0.5mg BID   -prns available for agitation (including Haldol, Zyprexa and Ativan)  -SW following for placement     Back cellulitis in 1/2021: Resolved  -Noted on 1/24, initiated on cephalexin at that time. Received local wound care and completed 7-day course of antibiotics on 1/30     Baseline Hypotension  -resolved     Hyperlipidemia  Chronic and stable on atorvastatin     COPD  Not O2 dependent.   -Chronic and stable on salmeterol, albuterol prn     Hypothyroidism  Chronic and stable on levothyroxine     Tobacco use disorder  Using nicotine patch and PRN gum     Resting tremor of upper extremities  No acute issues        Diet: Room Service  Diet  Combination Diet Regular Diet Adult; Safe Tray - with utensils     DVT Prophylaxis: Pneumatic Compression Devices   Valentin Catheter: not present  Code Status: Full Code     Disposition Plan    Expected discharge: Awaiting placement  Entered: Sushil Genao MD 03/28/2021, 2:18 PM        The patient's care was discussed with the Bedside Nurse and Patient.    Sushil Genao  MD  Hospitalist Service  [facility]    ______________________________________________________________________    Interval History   Sleepy this afternoon after receiving prn for agitation.      Data reviewed today: I reviewed all medications, new labs and imaging results over the last 24 hours. I personally reviewed no images or EKG's today.    Physical Exam   Vital signs:  Temp: 97.2  F (36.2  C) Temp src: Oral BP: 119/79 Pulse: 65   Resp: 16 SpO2: 97 % O2 Device: None (Room air)   Height: 182.9 cm (6') Weight: 84.4 kg (186 lb)  Estimated body mass index is 25.23 kg/m  as calculated from the following:    Height as of this encounter: 1.829 m (6').    Weight as of this encounter: 84.4 kg (186 lb).      Wt Readings from Last 2 Encounters:   03/19/21 84.4 kg (186 lb)   08/26/20 70.3 kg (155 lb)       Gen: well nourished male in NAD, lying in bed  Resp: lungs CTAB  CVS: RRR, normal s1/s2  Abd/GI:   Neuro- sleepy, briefly arouses to voice      Data   No lab results found in last 7 days.    No results found for this or any previous visit (from the past 24 hour(s)).  Medications       aspirin  81 mg Oral Daily     atorvastatin  80 mg Oral At Bedtime     benztropine  0.5 mg Oral BID     docusate sodium  100 mg Oral BID     haloperidol  10 mg Oral TID     levothyroxine  88 mcg Oral Daily     memantine  5 mg Oral Daily     salmeterol  1 puff Inhalation BID     venlafaxine  75 mg Oral Daily with breakfast

## 2021-03-28 NOTE — PLAN OF CARE
"DATE & TIME: 3/27/2021 [id number]           Cognitive Concerns/ Orientation : Alert to self.    BEHAVIOR & AGGRESSION TOOL COLOR: Green. Came out the room for minor things, like \"where am I\", \"my sister is picking me up.\" Easily redirected back to room.   CIWA SCORE: na      ABNL VS/O2: vss, on RA  MOBILITY: IND in room.   PAIN MANAGMENT: denied pain.   DIET: regular.   BOWEL/BLADDER: continent.   ABNL LAB/BG: na   DRAIN/DEVICES: no IV access.   TELEMETRY RHYTHM: na   SKIN: intact, showered.   TESTS/PROCEDURES: na   D/C DAY/GOALS/PLACE: pending placement and guardianship  OTHER IMPORTANT INFO: elisabet SANCHEZ/RN ROUNDING SIGNED OFF D/E SHIFT: na   COMMIT TO SIT DONE AND SIGNED OFF na   IS THE PATIENT ON REMDESIVIR? DATE OF LAST SCHEDULED DOSE: na     "

## 2021-03-28 NOTE — PLAN OF CARE
DATE & TIME: 3/28/2021 2048-3860    Cognitive Concerns/ Orientation : Alert to self.    BEHAVIOR & AGGRESSION TOOL COLOR: Door alarm on. Came out of room several times: wanting to leave, to smoke, and to go to 4th floor to  his money (he's tired of hospital food, wanting to order delivery). When told he could not leave, he slammed the foor and went back into room. He was visibly upset. PRN zyprexa 10 mg ODT  and nicotine gum given.   CIWA SCORE: na    ABNL VS/O2: vss, on RA  MOBILITY: IND  PAIN MANAGMENT: denied pain.   DIET: regular, good appetite.   BOWEL/BLADDER: continent.   ABNL LAB/BG: na   DRAIN/DEVICES: na   TELEMETRY RHYTHM: na   SKIN: intact.   TESTS/PROCEDURES: na   D/C DATE: pending guardianship from sister.   Discharge Barriers: guardianship.   OTHER IMPORTANT INFO: elisabet SANCHEZ/RN ROUNDING SIGNED OFF D/E SHIFT: na   COMMIT TO SIT DONE AND SIGNED OFF na   IS THE PATIENT ON REMDESIVIR? DATE OF LAST SCHEDULED DOSE: na

## 2021-03-28 NOTE — PLAN OF CARE
"DATE & TIME: 3/27/2021 8116-4810  Cognitive Concerns/ Orientation : Alert to self.    BEHAVIOR & AGGRESSION TOOL COLOR: Green. Came out the room for minor things, like \"where am I\", \"my sister is picking me up.\" Easily redirected back to room.   CIWA SCORE: na      ABNL VS/O2: no vitals taken this shift d/t daily vitals order.   MOBILITY: IND in room.   PAIN MANAGMENT: denied pain.   DIET: regular.   BOWEL/BLADDER: continent.   ABNL LAB/BG: na   DRAIN/DEVICES: no IV access.   TELEMETRY RHYTHM: na   SKIN: intact, refusing full skin assessment  TESTS/PROCEDURES: na   D/C DAY/GOALS/PLACE: pending placement and guardianship.  OTHER IMPORTANT INFO: na   "

## 2021-03-29 PROCEDURE — 120N000001 HC R&B MED SURG/OB

## 2021-03-29 PROCEDURE — 250N000013 HC RX MED GY IP 250 OP 250 PS 637: Performed by: HOSPITALIST

## 2021-03-29 PROCEDURE — 99231 SBSQ HOSP IP/OBS SF/LOW 25: CPT | Performed by: HOSPITALIST

## 2021-03-29 PROCEDURE — 250N000013 HC RX MED GY IP 250 OP 250 PS 637: Performed by: INTERNAL MEDICINE

## 2021-03-29 RX ADMIN — ASPIRIN 81 MG: 81 TABLET, COATED ORAL at 11:28

## 2021-03-29 RX ADMIN — HALOPERIDOL 10 MG: 5 TABLET ORAL at 18:06

## 2021-03-29 RX ADMIN — DOCUSATE SODIUM 100 MG: 100 CAPSULE, LIQUID FILLED ORAL at 21:56

## 2021-03-29 RX ADMIN — SALMETEROL XINAFOATE 1 PUFF: 50 POWDER, METERED ORAL; RESPIRATORY (INHALATION) at 21:56

## 2021-03-29 RX ADMIN — VENLAFAXINE HYDROCHLORIDE 75 MG: 75 CAPSULE, EXTENDED RELEASE ORAL at 11:27

## 2021-03-29 RX ADMIN — DOCUSATE SODIUM 100 MG: 100 CAPSULE, LIQUID FILLED ORAL at 11:28

## 2021-03-29 RX ADMIN — LEVOTHYROXINE SODIUM 88 MCG: 88 TABLET ORAL at 11:27

## 2021-03-29 RX ADMIN — SALMETEROL XINAFOATE 1 PUFF: 50 POWDER, METERED ORAL; RESPIRATORY (INHALATION) at 11:28

## 2021-03-29 RX ADMIN — ATORVASTATIN CALCIUM 80 MG: 40 TABLET, FILM COATED ORAL at 21:56

## 2021-03-29 RX ADMIN — HALOPERIDOL 10 MG: 5 TABLET ORAL at 11:27

## 2021-03-29 RX ADMIN — BENZTROPINE MESYLATE 0.5 MG: 0.5 TABLET ORAL at 21:56

## 2021-03-29 RX ADMIN — BENZTROPINE MESYLATE 0.5 MG: 0.5 TABLET ORAL at 11:28

## 2021-03-29 RX ADMIN — HALOPERIDOL 10 MG: 5 TABLET ORAL at 22:00

## 2021-03-29 RX ADMIN — MEMANTINE 5 MG: 5 TABLET ORAL at 11:27

## 2021-03-29 ASSESSMENT — ACTIVITIES OF DAILY LIVING (ADL)
ADLS_ACUITY_SCORE: 11

## 2021-03-29 NOTE — PLAN OF CARE
[id number]. Alert, oriented to person only. VSS on RA. Door alarm on. Up ad holley. Good appetite. Does not appear in any distress or pain. Watching TV in room, occasionally comes into hallway but is easily redirectable. Seems to enjoy walking. Is currently under civil commitment and court hold through [facility] until 7/31/21. SW helping w/ dispo plan; sister applying for legal guardianship.

## 2021-03-29 NOTE — PLAN OF CARE
DATE & TIME: 3/28/2021 4833-8861  Cognitive Concerns/ Orientation : Alert to self.    BEHAVIOR & AGGRESSION TOOL COLOR: Green. Slept most of the night.  CIWA SCORE: na      ABNL VS/O2: vss, on RA  MOBILITY: IND in room.   PAIN MANAGMENT: denied pain.   DIET: regular.   BOWEL/BLADDER: continent.   ABNL LAB/BG: na   DRAIN/DEVICES: no IV access.   TELEMETRY RHYTHM: na   SKIN: intact.  TESTS/PROCEDURES: na   D/C DAY/GOALS/PLACE: pending placement and guardianship  OTHER IMPORTANT INFO: na

## 2021-03-29 NOTE — PROGRESS NOTES
[facility]    Medicine Progress Note - Hospitalist Service        Date of Admission:  9/9/2020  6:29 AM    Assessment & Plan:   55 year old male with PMHx of schizophrenia, hx of TBI, alcohol use disorder, COPD, hyperlipidemia and hypothyroidism who was admitted on 9/9/2020 for evaluation of aggressive behavior at his group home.       Neurocognitive disorder dt hx of TBI and alcohol use disorder  Schizophrenia  Under commitment  Had been residing in group home prior to admission.  Brought to hospital for evaluation of aggressive behaviors. Group home now unwilling to take him back. Has had numerous CODE 21s called this stay. Seen by psych, meds adjusted. Is currently under civil commitment and court hold through [facility] until 7/31/21.   -Continues on Haldol 10mg TID (timed for when patient tends to be most agitated), memantine 5mg daily, venlafaxine 75mg daily, benztropine 0.5mg BID   -prns available for agitation (including Haldol, Zyprexa and Ativan)  -SW following for placement     Back cellulitis in 1/2021: Resolved  -Noted on 1/24, initiated on cephalexin at that time. Received local wound care and completed 7-day course of antibiotics on 1/30     Baseline Hypotension  -resolved     Hyperlipidemia  Chronic and stable on atorvastatin     COPD  Not O2 dependent.   -Chronic and stable on salmeterol, albuterol prn     Hypothyroidism  Chronic and stable on levothyroxine     Tobacco use disorder  Using nicotine patch and PRN gum     Resting tremor of upper extremities  No acute issues        Diet: Room Service  Diet  Combination Diet Regular Diet Adult; Safe Tray - with utensils     DVT Prophylaxis: Pneumatic Compression Devices   Valentin Catheter: not present  Code Status: Full Code     Disposition Plan    Expected discharge: Awaiting placement  Entered: Sushil Genao MD 03/29/2021, 1:13 PM        The patient's care was discussed with the Bedside Nurse and Patient.    Sushil Genao  "MD  Hospitalist Service  [facility]    ______________________________________________________________________    Interval History   Seems to be having some hallucinations today, reporting he is \"talking to his doctor\" at time of visit, indicating there is another person in the room, which there is not.  Is not cognizant of the hallucination.   Otherwise has no complaints.     Data reviewed today: I reviewed all medications, new labs and imaging results over the last 24 hours. I personally reviewed no images or EKG's today.    Physical Exam   Vital signs:  Temp: 98  F (36.7  C) Temp src: Oral BP: 99/49 Pulse: 97   Resp: 18 SpO2: 97 % O2 Device: None (Room air)   Height: 182.9 cm (6') Weight: 84.4 kg (186 lb)  Estimated body mass index is 25.23 kg/m  as calculated from the following:    Height as of this encounter: 1.829 m (6').    Weight as of this encounter: 84.4 kg (186 lb).      Wt Readings from Last 2 Encounters:   03/19/21 84.4 kg (186 lb)   08/26/20 70.3 kg (155 lb)       Gen: well nourished male in NAD, lying in bed  Resp: lungs CTAB  CVS: RRR, normal s1/s2  Abd/GI: normal bowel sounds  Neuro- alert, cranial nerves grossly intact      Data   No lab results found in last 7 days.    No results found for this or any previous visit (from the past 24 hour(s)).  Medications       aspirin  81 mg Oral Daily     atorvastatin  80 mg Oral At Bedtime     benztropine  0.5 mg Oral BID     docusate sodium  100 mg Oral BID     haloperidol  10 mg Oral TID     levothyroxine  88 mcg Oral Daily     memantine  5 mg Oral Daily     salmeterol  1 puff Inhalation BID     venlafaxine  75 mg Oral Daily with breakfast       "

## 2021-03-29 NOTE — PLAN OF CARE
Patient A&0x4. Up ad holley. He has been restless this shift. We have walked around the unit a couple of times this shift. He states he done not understand why he is here as he has 2 mansions to live in. Patient easily redirected. No complaints of pain. On RA. Lungs clear. No edema noted. Door alarm on.

## 2021-03-30 PROCEDURE — 250N000013 HC RX MED GY IP 250 OP 250 PS 637: Performed by: INTERNAL MEDICINE

## 2021-03-30 PROCEDURE — 250N000013 HC RX MED GY IP 250 OP 250 PS 637: Performed by: PSYCHIATRY & NEUROLOGY

## 2021-03-30 PROCEDURE — 250N000013 HC RX MED GY IP 250 OP 250 PS 637: Performed by: HOSPITALIST

## 2021-03-30 PROCEDURE — U0003 INFECTIOUS AGENT DETECTION BY NUCLEIC ACID (DNA OR RNA); SEVERE ACUTE RESPIRATORY SYNDROME CORONAVIRUS 2 (SARS-COV-2) (CORONAVIRUS DISEASE [COVID-19]), AMPLIFIED PROBE TECHNIQUE, MAKING USE OF HIGH THROUGHPUT TECHNOLOGIES AS DESCRIBED BY CMS-2020-01-R: HCPCS | Performed by: HOSPITALIST

## 2021-03-30 PROCEDURE — 99231 SBSQ HOSP IP/OBS SF/LOW 25: CPT | Performed by: HOSPITALIST

## 2021-03-30 PROCEDURE — U0005 INFEC AGEN DETEC AMPLI PROBE: HCPCS | Performed by: HOSPITALIST

## 2021-03-30 PROCEDURE — 120N000001 HC R&B MED SURG/OB

## 2021-03-30 RX ADMIN — HALOPERIDOL 10 MG: 5 TABLET ORAL at 16:53

## 2021-03-30 RX ADMIN — LEVOTHYROXINE SODIUM 88 MCG: 88 TABLET ORAL at 09:02

## 2021-03-30 RX ADMIN — HALOPERIDOL 10 MG: 5 TABLET ORAL at 09:02

## 2021-03-30 RX ADMIN — MEMANTINE 5 MG: 5 TABLET ORAL at 09:02

## 2021-03-30 RX ADMIN — BENZTROPINE MESYLATE 0.5 MG: 0.5 TABLET ORAL at 09:02

## 2021-03-30 RX ADMIN — ATORVASTATIN CALCIUM 80 MG: 40 TABLET, FILM COATED ORAL at 20:49

## 2021-03-30 RX ADMIN — DOCUSATE SODIUM 100 MG: 100 CAPSULE, LIQUID FILLED ORAL at 20:49

## 2021-03-30 RX ADMIN — HALOPERIDOL 10 MG: 5 TABLET ORAL at 20:49

## 2021-03-30 RX ADMIN — SALMETEROL XINAFOATE 1 PUFF: 50 POWDER, METERED ORAL; RESPIRATORY (INHALATION) at 20:50

## 2021-03-30 RX ADMIN — ASPIRIN 81 MG: 81 TABLET, COATED ORAL at 09:02

## 2021-03-30 RX ADMIN — VENLAFAXINE HYDROCHLORIDE 75 MG: 75 CAPSULE, EXTENDED RELEASE ORAL at 09:02

## 2021-03-30 RX ADMIN — BENZTROPINE MESYLATE 0.5 MG: 0.5 TABLET ORAL at 20:49

## 2021-03-30 RX ADMIN — LORAZEPAM 1 MG: 1 TABLET ORAL at 19:11

## 2021-03-30 RX ADMIN — SALMETEROL XINAFOATE 1 PUFF: 50 POWDER, METERED ORAL; RESPIRATORY (INHALATION) at 09:08

## 2021-03-30 ASSESSMENT — ACTIVITIES OF DAILY LIVING (ADL)
ADLS_ACUITY_SCORE: 11

## 2021-03-30 NOTE — PLAN OF CARE
DATE & TIME: 3/29/21 9814-4300  Cognitive Concerns/ Orientation : Alert to self.    BEHAVIOR & AGGRESSION TOOL COLOR: Green. Slept most of the night.  CIWA SCORE: NA    ABNL VS/O2: VSS on RA  MOBILITY: IND in room.   PAIN MANAGMENT: denied pain.   DIET: regular.   BOWEL/BLADDER: continent.   ABNL LAB/BG: NA  DRAIN/DEVICES: no IV access.   TELEMETRY RHYTHM: NA  SKIN: intact.  TESTS/PROCEDURES: NA   D/C DAY/GOALS/PLACE: pending placement and guardianship  OTHER IMPORTANT INFO: NA

## 2021-03-30 NOTE — PLAN OF CARE
Cognitive Concerns/ Orientation : Alert to self.  Whispers.  BEHAVIOR & AGGRESSION TOOL COLOR: Green. Slept most of the night.  CIWA SCORE: NA    ABNL VS/O2: VSS on RA  MOBILITY: IND in room.   PAIN MANAGMENT: denied pain.   DIET: regular.   BOWEL/BLADDER: continent.   ABNL LAB/BG: NA  DRAIN/DEVICES: no IV access.   TELEMETRY RHYTHM: NA  SKIN: intact.  TESTS/PROCEDURES: NA   D/C DAY/GOALS/PLACE: pending placement and guardianship  OTHER IMPORTANT INFO: NA

## 2021-03-30 NOTE — PROGRESS NOTES
[facility]    Medicine Progress Note - Hospitalist Service        Date of Admission:  9/9/2020  6:29 AM    Assessment & Plan:   55 year old male with PMHx of schizophrenia, hx of TBI, alcohol use disorder, COPD, hyperlipidemia and hypothyroidism who was admitted on 9/9/2020 for evaluation of aggressive behavior at his group home.       Neurocognitive disorder dt hx of TBI and alcohol use disorder  Schizophrenia  Under commitment  Had been residing in group home prior to admission.  Brought to hospital for evaluation of aggressive behaviors. Group home now unwilling to take him back. Has had numerous CODE 21s called this stay. Seen by psych, meds adjusted. Is currently under civil commitment and court hold through [facility] until 7/31/21.   -Continues on Haldol 10mg TID (timed for when patient tends to be most agitated), memantine 5mg daily, venlafaxine 75mg daily, benztropine 0.5mg BID   -prns available for agitation (including Haldol, Zyprexa and Ativan)  -SW following for placement     Back cellulitis in 1/2021: Resolved  -Noted on 1/24, initiated on cephalexin at that time. Received local wound care and completed 7-day course of antibiotics on 1/30     Baseline Hypotension  -resolved     Hyperlipidemia  Chronic and stable on atorvastatin     COPD  Not O2 dependent.   -Chronic and stable on salmeterol, albuterol prn     Hypothyroidism  Chronic and stable on levothyroxine     Tobacco use disorder  Using nicotine patch and PRN gum     Resting tremor of upper extremities  No acute issues        Diet: Room Service  Diet  Combination Diet Regular Diet Adult; Safe Tray - with utensils     DVT Prophylaxis: Pneumatic Compression Devices   Valentin Catheter: not present  Code Status: Full Code     Disposition Plan    Expected discharge: Awaiting placement  Entered: Sushil Genao MD 03/30/2021, 4:26 PM        The patient's care was discussed with the Bedside Nurse and Patient.    Sushil Genao  MD  Hospitalist Service  [facility]    ______________________________________________________________________    Interval History    Has no complaints today, denies any pain or other concerns.  RN reports no concerns.     Data reviewed today: I reviewed all medications, new labs and imaging results over the last 24 hours. I personally reviewed no images or EKG's today.    Physical Exam   Vital signs:  Temp: 97.4  F (36.3  C) Temp src: Oral BP: 130/86 Pulse: 59   Resp: 18 SpO2: 98 % O2 Device: None (Room air)   Height: 182.9 cm (6') Weight: 84.4 kg (186 lb)  Estimated body mass index is 25.23 kg/m  as calculated from the following:    Height as of this encounter: 1.829 m (6').    Weight as of this encounter: 84.4 kg (186 lb).      Wt Readings from Last 2 Encounters:   03/19/21 84.4 kg (186 lb)   08/26/20 70.3 kg (155 lb)       Gen: well nourished male in NAD  Resp: lungs CTAB, no wheezes or crackles  CVS: RRR, normal s1/s2  Abd/GI:   Neuro- alert, cranial nerves grossly intact      Data   No lab results found in last 7 days.    No results found for this or any previous visit (from the past 24 hour(s)).  Medications       aspirin  81 mg Oral Daily     atorvastatin  80 mg Oral At Bedtime     benztropine  0.5 mg Oral BID     docusate sodium  100 mg Oral BID     haloperidol  10 mg Oral TID     levothyroxine  88 mcg Oral Daily     memantine  5 mg Oral Daily     salmeterol  1 puff Inhalation BID     venlafaxine  75 mg Oral Daily with breakfast

## 2021-03-31 PROCEDURE — 250N000013 HC RX MED GY IP 250 OP 250 PS 637: Performed by: INTERNAL MEDICINE

## 2021-03-31 PROCEDURE — 99232 SBSQ HOSP IP/OBS MODERATE 35: CPT | Performed by: INTERNAL MEDICINE

## 2021-03-31 PROCEDURE — 120N000001 HC R&B MED SURG/OB

## 2021-03-31 PROCEDURE — 250N000013 HC RX MED GY IP 250 OP 250 PS 637: Performed by: HOSPITALIST

## 2021-03-31 RX ADMIN — ATORVASTATIN CALCIUM 80 MG: 40 TABLET, FILM COATED ORAL at 21:39

## 2021-03-31 RX ADMIN — MEMANTINE 5 MG: 5 TABLET ORAL at 08:33

## 2021-03-31 RX ADMIN — LEVOTHYROXINE SODIUM 88 MCG: 88 TABLET ORAL at 08:32

## 2021-03-31 RX ADMIN — ASPIRIN 81 MG: 81 TABLET, COATED ORAL at 08:33

## 2021-03-31 RX ADMIN — SALMETEROL XINAFOATE 1 PUFF: 50 POWDER, METERED ORAL; RESPIRATORY (INHALATION) at 08:58

## 2021-03-31 RX ADMIN — BENZTROPINE MESYLATE 0.5 MG: 0.5 TABLET ORAL at 21:39

## 2021-03-31 RX ADMIN — DOCUSATE SODIUM 100 MG: 100 CAPSULE, LIQUID FILLED ORAL at 21:39

## 2021-03-31 RX ADMIN — BENZTROPINE MESYLATE 0.5 MG: 0.5 TABLET ORAL at 08:33

## 2021-03-31 RX ADMIN — HALOPERIDOL 10 MG: 5 TABLET ORAL at 21:39

## 2021-03-31 RX ADMIN — HALOPERIDOL 10 MG: 5 TABLET ORAL at 08:32

## 2021-03-31 RX ADMIN — VENLAFAXINE HYDROCHLORIDE 75 MG: 75 CAPSULE, EXTENDED RELEASE ORAL at 08:33

## 2021-03-31 RX ADMIN — DOCUSATE SODIUM 100 MG: 100 CAPSULE, LIQUID FILLED ORAL at 08:32

## 2021-03-31 RX ADMIN — SALMETEROL XINAFOATE 1 PUFF: 50 POWDER, METERED ORAL; RESPIRATORY (INHALATION) at 21:39

## 2021-03-31 RX ADMIN — HALOPERIDOL 10 MG: 5 TABLET ORAL at 15:13

## 2021-03-31 ASSESSMENT — ACTIVITIES OF DAILY LIVING (ADL)
ADLS_ACUITY_SCORE: 11

## 2021-03-31 ASSESSMENT — MIFFLIN-ST. JEOR: SCORE: 1704.88

## 2021-03-31 NOTE — PLAN OF CARE
DATE & TIME: 3/30/21 [id number]  Cognitive Concerns/ Orientation : Alert to self.  Whispers.  BEHAVIOR & AGGRESSION TOOL COLOR: Green. Slept most of the night.  CIWA SCORE: NA    ABNL VS/O2: VSS on RA  MOBILITY: IND in room.   PAIN MANAGMENT: denied pain.   DIET: regular.   BOWEL/BLADDER: continent.   ABNL LAB/BG: NA  DRAIN/DEVICES: no IV access.   TELEMETRY RHYTHM: NA  SKIN: intact.  TESTS/PROCEDURES: Repeat covid negative   D/C DAY/GOALS/PLACE: pending placement and guardianship  OTHER IMPORTANT INFO: NA

## 2021-03-31 NOTE — PLAN OF CARE
DATE & TIME: 3/31/21 07-19  Cognitive Concerns/ Orientation : Alert to self.  Whispers.  BEHAVIOR & AGGRESSION TOOL COLOR: Green. Slept most of the night.  CIWA SCORE: NA    ABNL VS/O2: VSS on RA  MOBILITY: IND in room.   PAIN MANAGMENT: denied pain.   DIET: regular.   BOWEL/BLADDER: continent.   ABNL LAB/BG: NA  DRAIN/DEVICES: no IV access.   TELEMETRY RHYTHM: NA  SKIN: intact.  TESTS/PROCEDURES: Repeat covid negative   D/C DAY/GOALS/PLACE: pending placement and guardianship  OTHER IMPORTANT INFO: Pt having extra-pyramidal effects-tongue movements and head shaking. Psych consult placed for tomorrow.

## 2021-03-31 NOTE — PROGRESS NOTES
Pt coming out to nurses station frequently, asking for cigarettes, saying his hold is up and he can leave and go back to his mansions. Pt states his room is driving him crazy-he is hearing voices in his head. Ambulated with pt in the cruz.

## 2021-03-31 NOTE — PROGRESS NOTES
[facility]    Medicine Progress Note - Hospitalist Service        Date of Admission:  9/9/2020  6:29 AM    Assessment & Plan:   55 year old male with PMHx of schizophrenia, hx of TBI, alcohol use disorder, COPD, hyperlipidemia and hypothyroidism who was admitted on 9/9/2020 for evaluation of aggressive behavior at his group home.       Neurocognitive disorder dt hx of TBI and alcohol use disorder  Schizophrenia  Under commitment  Had been residing in group home prior to admission.  Brought to hospital for evaluation of aggressive behaviors. Group home now unwilling to take him back. Has had numerous CODE 21s called this stay. Seen by psych, meds adjusted. Is currently under civil commitment and court hold through [facility] until 7/31/21.   -Continues on Haldol 10mg TID (timed for when patient tends to be most agitated), memantine 5mg daily, venlafaxine 75mg daily, benztropine 0.5mg BID   -prns available for agitation (including Haldol, Zyprexa and Ativan)  -SW following for placement     Possible Tardive Dyskinesia  -- discuss with psych (?alternative anti-psychotic)    Back cellulitis in 1/2021: Resolved  -Noted on 1/24, initiated on cephalexin at that time. Received local wound care and completed 7-day course of antibiotics on 1/30     Baseline Hypotension  -resolved     Hyperlipidemia  Chronic and stable on atorvastatin     COPD  Not O2 dependent.   -Chronic and stable on salmeterol, albuterol prn     Hypothyroidism  Chronic and stable on levothyroxine     Tobacco use disorder  Using nicotine patch and PRN gum     Resting tremor of upper extremities  No acute issues        Diet: Room Service  Diet  Combination Diet Regular Diet Adult; Safe Tray - with utensils     DVT Prophylaxis: Pneumatic Compression Devices   Valentin Catheter: not present  Code Status: Full Code     Disposition Plan    Expected discharge: Awaiting placement  Entered: Deuce Turner MD 03/31/2021, 2:15 PM         The patient's care was discussed with the Bedside Nurse and Patient.    Sushil Genao MD  Hospitalist Service  [facility]    ______________________________________________________________________    Interval History    Has no complaints today, denies any pain or other concerns.  RN reports no concerns.     Data reviewed today: I reviewed all medications, new labs and imaging results over the last 24 hours. I personally reviewed no images or EKG's today.    Physical Exam   Vital signs:  Temp: 98.2  F (36.8  C) Temp src: Oral BP: 123/73 Pulse: 90   Resp: 16 SpO2: 97 % O2 Device: None (Room air)   Height: 182.9 cm (6') Weight: 84.4 kg (186 lb)  Estimated body mass index is 25.23 kg/m  as calculated from the following:    Height as of this encounter: 1.829 m (6').    Weight as of this encounter: 84.4 kg (186 lb).      Wt Readings from Last 2 Encounters:   03/19/21 84.4 kg (186 lb)   08/26/20 70.3 kg (155 lb)       Gen: well nourished male in NAD  Resp: lungs CTAB, no wheezes or crackles  CVS: RRR, normal s1/s2  Abd/GI:   Neuro- alert, cranial nerves grossly intact, some tongue fasciculations noted        Data   No lab results found in last 7 days.    No results found for this or any previous visit (from the past 24 hour(s)).  Medications       aspirin  81 mg Oral Daily     atorvastatin  80 mg Oral At Bedtime     benztropine  0.5 mg Oral BID     docusate sodium  100 mg Oral BID     haloperidol  10 mg Oral TID     levothyroxine  88 mcg Oral Daily     memantine  5 mg Oral Daily     salmeterol  1 puff Inhalation BID     venlafaxine  75 mg Oral Daily with breakfast

## 2021-03-31 NOTE — PROGRESS NOTES
Pt continually coming out of his room, asking to go out to smoke, when told no he would say he is going to sign out. Explained to pt several times that this is a hospital and no smoking is allowed, also explained to pt he is on a commitment hold and can't leave. Tried ambulating pt in the cruz, offered nicorette gum. Ativan given. Covid swab repeated.

## 2021-04-01 PROCEDURE — 250N000013 HC RX MED GY IP 250 OP 250 PS 637: Performed by: NURSE PRACTITIONER

## 2021-04-01 PROCEDURE — 120N000001 HC R&B MED SURG/OB

## 2021-04-01 PROCEDURE — 250N000013 HC RX MED GY IP 250 OP 250 PS 637: Performed by: PSYCHIATRY & NEUROLOGY

## 2021-04-01 PROCEDURE — 250N000013 HC RX MED GY IP 250 OP 250 PS 637: Performed by: HOSPITALIST

## 2021-04-01 PROCEDURE — 99231 SBSQ HOSP IP/OBS SF/LOW 25: CPT | Performed by: INTERNAL MEDICINE

## 2021-04-01 PROCEDURE — 99232 SBSQ HOSP IP/OBS MODERATE 35: CPT | Performed by: PSYCHIATRY & NEUROLOGY

## 2021-04-01 PROCEDURE — 250N000013 HC RX MED GY IP 250 OP 250 PS 637: Performed by: INTERNAL MEDICINE

## 2021-04-01 RX ORDER — BENZTROPINE MESYLATE 0.5 MG/1
1 TABLET ORAL 2 TIMES DAILY
Status: DISCONTINUED | OUTPATIENT
Start: 2021-04-01 | End: 2021-06-30 | Stop reason: HOSPADM

## 2021-04-01 RX ORDER — VITAMIN E 268 MG
800 CAPSULE ORAL DAILY
Status: DISCONTINUED | OUTPATIENT
Start: 2021-04-01 | End: 2021-06-30 | Stop reason: HOSPADM

## 2021-04-01 RX ADMIN — LORAZEPAM 1 MG: 1 TABLET ORAL at 13:01

## 2021-04-01 RX ADMIN — HALOPERIDOL 10 MG: 5 TABLET ORAL at 08:40

## 2021-04-01 RX ADMIN — ATORVASTATIN CALCIUM 80 MG: 40 TABLET, FILM COATED ORAL at 20:15

## 2021-04-01 RX ADMIN — HALOPERIDOL 10 MG: 5 TABLET ORAL at 16:43

## 2021-04-01 RX ADMIN — SALMETEROL XINAFOATE 1 PUFF: 50 POWDER, METERED ORAL; RESPIRATORY (INHALATION) at 08:40

## 2021-04-01 RX ADMIN — BENZTROPINE MESYLATE 1 MG: 0.5 TABLET ORAL at 20:15

## 2021-04-01 RX ADMIN — VENLAFAXINE HYDROCHLORIDE 75 MG: 75 CAPSULE, EXTENDED RELEASE ORAL at 08:40

## 2021-04-01 RX ADMIN — ASPIRIN 81 MG: 81 TABLET, COATED ORAL at 08:40

## 2021-04-01 RX ADMIN — DOCUSATE SODIUM 100 MG: 100 CAPSULE, LIQUID FILLED ORAL at 20:15

## 2021-04-01 RX ADMIN — DOCUSATE SODIUM 100 MG: 100 CAPSULE, LIQUID FILLED ORAL at 08:40

## 2021-04-01 RX ADMIN — HALOPERIDOL 10 MG: 5 TABLET ORAL at 20:15

## 2021-04-01 RX ADMIN — Medication 800 UNITS: at 13:01

## 2021-04-01 RX ADMIN — BENZTROPINE MESYLATE 1 MG: 0.5 TABLET ORAL at 08:40

## 2021-04-01 RX ADMIN — SALMETEROL XINAFOATE 1 PUFF: 50 POWDER, METERED ORAL; RESPIRATORY (INHALATION) at 20:14

## 2021-04-01 RX ADMIN — NICOTINE POLACRILEX 2 MG: 2 GUM, CHEWING ORAL at 09:56

## 2021-04-01 RX ADMIN — MEMANTINE 5 MG: 5 TABLET ORAL at 08:40

## 2021-04-01 RX ADMIN — LEVOTHYROXINE SODIUM 88 MCG: 88 TABLET ORAL at 08:40

## 2021-04-01 ASSESSMENT — ACTIVITIES OF DAILY LIVING (ADL)
ADLS_ACUITY_SCORE: 11

## 2021-04-01 NOTE — CONSULTS
"[facility] Psychiatric Consult Progress Note    Interval History:   I met with the patient on station [id number].  Routine follow-up was requested with respect to his psychiatric medications, and concern about tardive dyskinesia. He has been on haldol long term, also coge ntin. He has some chewing movements on exam. He is waiting on placement. He is pleasant, confused, Denies suicidal or homicidal ideation. He denies psychotic sx though seems guarded.      Review of systems:   10 point Review of Systems completed by Sushil Wilks MD , and is  is negative other than noted in the HPI     Medications:       aspirin  81 mg Oral Daily     atorvastatin  80 mg Oral At Bedtime     benztropine  0.5 mg Oral BID     docusate sodium  100 mg Oral BID     haloperidol  10 mg Oral TID     levothyroxine  88 mcg Oral Daily     memantine  5 mg Oral Daily     salmeterol  1 puff Inhalation BID     venlafaxine  75 mg Oral Daily with breakfast     acetaminophen, albuterol, alum & mag hydroxide-simethicone, haloperidol, LORazepam, melatonin, naloxone **OR** naloxone **OR** naloxone **OR** naloxone, nicotine, nicotine, OLANZapine, OLANZapine zydis, ondansetron **OR** ondansetron, polyethylene glycol    Mental Status Examination:   Appearance:  awake, alert  Eye Contact: Intense, he tends to stare blankly at times  Speech:  Impoverished, monotone, speaks in a whisper  language: minimal  Psychomotor Behavior:  some buccal-lingual dyskinesia, chewing movements  Mood: \"good\"  affect: Blunted   thought Process:  paucity of speech, no obvious loosening of associations flight of ideas or formal thought disorder  Thought Content:  no evidence of suicidal ideation or homicidal ideation, but at times he does tend to stare off into space, looks a guarded, but pleasant  Oriented to: Person only, he thought he was at the [facility] stating \"I am at [facility]\"  Attention Span and " Concentration:  poor  Recent and Remote Memory:  limited  Fund of Knowledge: delayed  Insight:  limited  Judgment:  limited       Labs/Vitals:   No results found for this or any previous visit (from the past 24 hour(s)).  B/P: 123/95, T: 97.6, P: 89, R: 18  Impression  John is a 55-year-old gentleman with a known major neurocognitive disorder and unspecified psychotic illness.  Psychiatrically things are really unchanged with him. He may have some TD, we will increase cogentin, add vitamin D. Ingrezza is an option but cost is significant, something that would generally be reserved for outpatient care as there are specific funding issues. Taking him off his haldol is not advisable      DIagnoses:   1.  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   2.  Other schizophrenia spectrum disorder.   3.  Alcohol use disorder in remission.   4.  Stimulant use disorder, in remission.   5.  Chronic obstructive pulmonary disease.   6.  Hypertension.   7. R/O mild TD vs EPSE       Plan:   1.  Continue present medication   2.  Increase cogentin to 1mg bid, add vitamin E 800 international unit(s) daily  3.  Continue with door alarm, he is under commitment, it looks like they are pursuing guardianship  4. Ingrezza is a future option for persistent TD, but not something we would initiate in a hospital setting    Attestation:  Patient has been seen and evaluated by me,  Sushil Wilks MD    Visit/Communication Style   In person, face-to-face

## 2021-04-01 NOTE — PROGRESS NOTES
"[facility]    Medicine Progress Note - Hospitalist Service        Date of Admission:  9/9/2020  6:29 AM    Assessment & Plan:   55 year old male with PMHx of schizophrenia, hx of TBI, alcohol use disorder, COPD, hyperlipidemia and hypothyroidism who was admitted on 9/9/2020 for evaluation of aggressive behavior at his group home and now under commitment:      Neurocognitive disorder dt hx of TBI and alcohol use disorder  Schizophrenia  Under commitment  Had been residing in group home prior to admission.  Brought to hospital for evaluation of aggressive behaviors. Group home now unwilling to take him back. Has had numerous CODE 21s called this stay. Seen by psych, meds adjusted. Is currently under civil commitment and court hold through [facility] until 7/31/21.   -Continues on Haldol 10mg TID (timed for when patient tends to be most agitated), memantine 5mg daily, venlafaxine 75mg daily, benztropine 0.5mg BID   -prns available for agitation (including Haldol, Zyprexa and Ativan)  -SW following for placement     Possible Tardive Dyskinesia  -- 12th psych consult today in last 6 months, \"Possible TD\" noted (not previously mentioned, limited options per Psych    Back cellulitis in 1/2021: Resolved  -Noted on 1/24, initiated on cephalexin at that time. Received local wound care and completed 7-day course of antibiotics on 1/30     Baseline Hypotension  -resolved     Hyperlipidemia  Chronic and stable on atorvastatin     COPD  Not O2 dependent.   -Chronic and stable on salmeterol, albuterol prn     Hypothyroidism  Chronic and stable on levothyroxine     Tobacco use disorder  Using nicotine patch and PRN gum     Resting tremor of upper extremities  No acute issues        Diet: Room Service  Diet  Combination Diet Regular Diet Adult; Safe Tray - with utensils     DVT Prophylaxis: Pneumatic Compression Devices   Valentin Catheter: not present  Code Status: Full Code     Disposition Plan    Expected " discharge: Awaiting placement, spoke with [facility] intake (phone [phone]) and they will explore placement options (?inpatient psych at [facility] -- lock unit would give him more freedom and contact with other individuals)    Deuce Turner MD  Pager: [phone]  Cell Phone:  [phone]   ______________________________________________________________________    Interval History    No complaints other than asking when he can leave.     Physical Exam   Vital signs:  Temp: (P) 98.4  F (36.9  C) Temp src: (P) Oral BP: (P) 99/66 Pulse: (P) 93   Resp: (P) 16 SpO2: (P) 96 % O2 Device: (P) None (Room air)   Height: 182.9 cm (6') Weight: 84.2 kg (185 lb 9.6 oz)  Estimated body mass index is 25.17 kg/m  as calculated from the following:    Height as of this encounter: 1.829 m (6').    Weight as of this encounter: 84.2 kg (185 lb 9.6 oz).      Wt Readings from Last 2 Encounters:   03/31/21 84.2 kg (185 lb 9.6 oz)   08/26/20 70.3 kg (155 lb)       Gen: well nourished male in NAD  Resp: lungs CTAB, no wheezes or crackles  CVS: RRR, normal s1/s2  Neuro- alert, cranial nerves grossly intact, some tongue fasciculations noted        Data   No lab results found in last 7 days.    No results found for this or any previous visit (from the past 24 hour(s)).  Medications       aspirin  81 mg Oral Daily     atorvastatin  80 mg Oral At Bedtime     benztropine  1 mg Oral BID     docusate sodium  100 mg Oral BID     haloperidol  10 mg Oral TID     levothyroxine  88 mcg Oral Daily     memantine  5 mg Oral Daily     salmeterol  1 puff Inhalation BID     venlafaxine  75 mg Oral Daily with breakfast     vitamin E  800 Units Oral Daily

## 2021-04-01 NOTE — PLAN OF CARE
DATE & TIME: 3/31/21 3792-2297  Cognitive Concerns/ Orientation : Alert to self.  Whispers.  BEHAVIOR & AGGRESSION TOOL COLOR: Green. Slept most of the night.  CIWA SCORE: NA    ABNL VS/O2: VSS on RA  MOBILITY: IND in room.   PAIN MANAGMENT: denied pain.   DIET: regular.   BOWEL/BLADDER: continent.   ABNL LAB/BG: NA  DRAIN/DEVICES: no IV access.   TELEMETRY RHYTHM: NA  SKIN: intact.  TESTS/PROCEDURES: Repeat covid negative   D/C DAY/GOALS/PLACE: pending placement and guardianship  OTHER IMPORTANT INFO: Pt having extra-pyramidal effects-tongue movements and head shaking. Psych consult placed for today.

## 2021-04-01 NOTE — PLAN OF CARE
Cognitive Concerns/ Orientation : Alert to self.  Whispers.  BEHAVIOR & AGGRESSION TOOL COLOR: Green. Yellow-tried to leave. Slams his door. Wants to smoke.             Gave ativan x1 orally.  CIWA SCORE: NA    ABNL VS/O2: VSS on RA  MOBILITY: IND in room.   PAIN MANAGMENT: denied pain.   DIET: regular. Good appetite  BOWEL/BLADDER: continent.   ABNL LAB/BG: NA  DRAIN/DEVICES: no IV access.   TELEMETRY RHYTHM: NA  SKIN: intact.  TESTS/PROCEDURES: Repeat covid negative   D/C DAY/GOALS/PLACE: pending placement and guardianship  OTHER IMPORTANT INFO: Pt having extra-pyramidal effects-tongue movements and head shaking. Psych consult today. Started vitamin E, increased cogentin for tardive dyskinesia

## 2021-04-02 ENCOUNTER — TELEPHONE (OUTPATIENT)
Dept: BEHAVIORAL HEALTH | Facility: CLINIC | Age: 56
End: 2021-04-02

## 2021-04-02 PROCEDURE — 250N000013 HC RX MED GY IP 250 OP 250 PS 637: Performed by: PSYCHIATRY & NEUROLOGY

## 2021-04-02 PROCEDURE — 250N000013 HC RX MED GY IP 250 OP 250 PS 637: Performed by: HOSPITALIST

## 2021-04-02 PROCEDURE — 99231 SBSQ HOSP IP/OBS SF/LOW 25: CPT | Performed by: INTERNAL MEDICINE

## 2021-04-02 PROCEDURE — 250N000013 HC RX MED GY IP 250 OP 250 PS 637: Performed by: INTERNAL MEDICINE

## 2021-04-02 PROCEDURE — 120N000001 HC R&B MED SURG/OB

## 2021-04-02 PROCEDURE — 250N000013 HC RX MED GY IP 250 OP 250 PS 637: Performed by: NURSE PRACTITIONER

## 2021-04-02 RX ADMIN — ATORVASTATIN CALCIUM 80 MG: 40 TABLET, FILM COATED ORAL at 20:36

## 2021-04-02 RX ADMIN — SALMETEROL XINAFOATE 1 PUFF: 50 POWDER, METERED ORAL; RESPIRATORY (INHALATION) at 10:09

## 2021-04-02 RX ADMIN — DOCUSATE SODIUM 100 MG: 100 CAPSULE, LIQUID FILLED ORAL at 20:35

## 2021-04-02 RX ADMIN — ASPIRIN 81 MG: 81 TABLET, COATED ORAL at 10:05

## 2021-04-02 RX ADMIN — HALOPERIDOL 10 MG: 5 TABLET ORAL at 15:36

## 2021-04-02 RX ADMIN — Medication 800 UNITS: at 10:05

## 2021-04-02 RX ADMIN — SALMETEROL XINAFOATE 1 PUFF: 50 POWDER, METERED ORAL; RESPIRATORY (INHALATION) at 20:41

## 2021-04-02 RX ADMIN — HALOPERIDOL 10 MG: 5 TABLET ORAL at 20:36

## 2021-04-02 RX ADMIN — DOCUSATE SODIUM 100 MG: 100 CAPSULE, LIQUID FILLED ORAL at 10:05

## 2021-04-02 RX ADMIN — BENZTROPINE MESYLATE 1 MG: 0.5 TABLET ORAL at 20:35

## 2021-04-02 RX ADMIN — LORAZEPAM 1 MG: 1 TABLET ORAL at 12:13

## 2021-04-02 RX ADMIN — LEVOTHYROXINE SODIUM 88 MCG: 88 TABLET ORAL at 10:05

## 2021-04-02 RX ADMIN — BENZTROPINE MESYLATE 1 MG: 0.5 TABLET ORAL at 10:05

## 2021-04-02 RX ADMIN — VENLAFAXINE HYDROCHLORIDE 75 MG: 75 CAPSULE, EXTENDED RELEASE ORAL at 10:05

## 2021-04-02 RX ADMIN — NICOTINE POLACRILEX 2 MG: 2 GUM, CHEWING ORAL at 12:13

## 2021-04-02 RX ADMIN — MEMANTINE 5 MG: 5 TABLET ORAL at 10:05

## 2021-04-02 RX ADMIN — HALOPERIDOL 10 MG: 5 TABLET ORAL at 10:05

## 2021-04-02 ASSESSMENT — ACTIVITIES OF DAILY LIVING (ADL)
ADLS_ACUITY_SCORE: 11

## 2021-04-02 NOTE — PLAN OF CARE
DATE & TIME:4/1/21 6847-6114  Cognitive Concerns/ Orientation : Alert to self.  Whispers.  BEHAVIOR & AGGRESSION TOOL COLOR: Green this shift  CIWA SCORE: NA    ABNL VS/O2: No vitals done this shift  MOBILITY: IND in room.   PAIN MANAGMENT: denied pain.   DIET: regular. Good appetite  BOWEL/BLADDER: continent.   ABNL LAB/BG: NA  DRAIN/DEVICES: no IV access.   SKIN: intact.  TESTS/PROCEDURES: nothing this shift  D/C DAY/GOALS/PLACE: pending placement and guardianship  OTHER IMPORTANT INFO: On commitment hold. Pt having extra-pyramidal effects-tongue movements and head shaking. Psych consult today. Started vitamin E, increased cogentin for tardive dyskinesia

## 2021-04-02 NOTE — PROGRESS NOTES
Care Management Follow Up    Length of Stay (days): 195    Expected Discharge Date: 04/09/21     Concerns to be Addressed: patient refuses services, discharge planning     Patient plan of care discussed at interdisciplinary rounds: Yes    Anticipated Discharge Disposition: Other (Comments)     Anticipated Discharge Services: None  Anticipated Discharge DME: None    Patient/family educated on Medicare website which has current facility and service quality ratings: (not applicable)  Education Provided on the Discharge Plan:    Patient/Family in Agreement with the Plan: no    Referrals Placed by CM/SW: Post Acute Facilities  Private pay costs discussed: Not applicable    Additional Information:  Writer received a call from Yola at [facility] Mental [facility] Intake.  She reports Dr Barnes made a referral for patient to transfer to psychiatry. Yola assessed due to patient's neurocognitive disorder he is not appropriate for our system mental health units.  She did enter a note which can be viewed under chart review notes.    This writer has followed patient for much of his stay.  Back on 1/5/21, psychiatry was consulted to determine if patient could be transferred to mental health on a secured unit or to [facility].  Dr Batres stated  patient's primary diagnosis is a neurocognitive disorder and targeted symptoms are secondary to that condition.   The recommendation was that patient did not meet criteria for In-patient psychiatry and would be best served in a long term care facility.     Plan:  Awaiting guardianship and once we patient has a guardian,  [facility] has accepted patient.  Writer also asking [facility] to re-assess patient for their [place] or [facility] location.  At one of the [facility] he would be on a secured unit for patient's who have behavioral challenges or have neurocognitive disorder.    ASIM CastilloSW

## 2021-04-02 NOTE — PLAN OF CARE
DATE & TIME:4/2/21 7-3 pm   Cognitive Concerns/ Orientation : Alert to self.  Whispers/garbled speech.   BEHAVIOR & AGGRESSION TOOL COLOR: Yellow, aggressively came out of room x2 and slammed door.   CIWA SCORE: N/A    ABNL VS/O2: VSS on RA.   MOBILITY: IND in room.   PAIN MANAGMENT: Denied pain.   DIET: Regular. Good appetite.  BOWEL/BLADDER: Continent.   ABNL LAB/BG: Last full labs 02/06/21   DRAIN/DEVICES: no IV access.   SKIN: Pale/intact.  TESTS/PROCEDURES: N/A  D/C DAY/GOALS/PLACE: Pending guardianship, SW following.   OTHER IMPORTANT INFO: On commitment hold Psych saw yesterday. Started vitamin E, increased cogentin for tardive dyskinesia. PRN Ativan and Nicorette gum x1.

## 2021-04-02 NOTE — PLAN OF CARE
DATE & TIME:4/1/21 1900-2330  Cognitive Concerns/ Orientation : Alert to self.  Whispers.  BEHAVIOR & AGGRESSION TOOL COLOR: Green this shift  CIWA SCORE: NA    ABNL VS/O2: No vitals done this shift  MOBILITY: IND in room.   PAIN MANAGMENT: denied pain.   DIET: regular. Good appetite  BOWEL/BLADDER: continent.   ABNL LAB/BG: NA  DRAIN/DEVICES: no IV access.   SKIN: intact.  TESTS/PROCEDURES: nothing this shift  D/C DAY/GOALS/PLACE: pending placement and guardianship  OTHER IMPORTANT INFO: On commitment hold. Pt having extra-pyramidal effects-tongue movements and head shaking. Psych consult today. Started vitamin E, increased cogentin for tardive dyskinesia

## 2021-04-02 NOTE — TELEPHONE ENCOUNTER
S:   called intake wondering if pt could be moved to a MH unit.      B:  After looking into his chart it is seen he has a significant neurocognitive d/o and is not appropriate for our MH units.    R:  ML for Dulce GORDON  to this effect.  [phone]

## 2021-04-03 PROCEDURE — 120N000001 HC R&B MED SURG/OB

## 2021-04-03 PROCEDURE — 250N000013 HC RX MED GY IP 250 OP 250 PS 637: Performed by: HOSPITALIST

## 2021-04-03 PROCEDURE — 250N000013 HC RX MED GY IP 250 OP 250 PS 637: Performed by: INTERNAL MEDICINE

## 2021-04-03 PROCEDURE — 99231 SBSQ HOSP IP/OBS SF/LOW 25: CPT | Performed by: INTERNAL MEDICINE

## 2021-04-03 PROCEDURE — 250N000013 HC RX MED GY IP 250 OP 250 PS 637: Performed by: PSYCHIATRY & NEUROLOGY

## 2021-04-03 RX ADMIN — LEVOTHYROXINE SODIUM 88 MCG: 88 TABLET ORAL at 08:29

## 2021-04-03 RX ADMIN — VENLAFAXINE HYDROCHLORIDE 75 MG: 75 CAPSULE, EXTENDED RELEASE ORAL at 08:29

## 2021-04-03 RX ADMIN — SALMETEROL XINAFOATE 1 PUFF: 50 POWDER, METERED ORAL; RESPIRATORY (INHALATION) at 08:32

## 2021-04-03 RX ADMIN — BENZTROPINE MESYLATE 1 MG: 0.5 TABLET ORAL at 22:05

## 2021-04-03 RX ADMIN — DOCUSATE SODIUM 100 MG: 100 CAPSULE, LIQUID FILLED ORAL at 08:29

## 2021-04-03 RX ADMIN — LORAZEPAM 1 MG: 1 TABLET ORAL at 17:27

## 2021-04-03 RX ADMIN — BENZTROPINE MESYLATE 1 MG: 0.5 TABLET ORAL at 08:29

## 2021-04-03 RX ADMIN — SALMETEROL XINAFOATE 1 PUFF: 50 POWDER, METERED ORAL; RESPIRATORY (INHALATION) at 22:05

## 2021-04-03 RX ADMIN — Medication 800 UNITS: at 08:28

## 2021-04-03 RX ADMIN — MEMANTINE 5 MG: 5 TABLET ORAL at 08:29

## 2021-04-03 RX ADMIN — HALOPERIDOL 10 MG: 5 TABLET ORAL at 08:29

## 2021-04-03 RX ADMIN — HALOPERIDOL 10 MG: 5 TABLET ORAL at 22:05

## 2021-04-03 RX ADMIN — ASPIRIN 81 MG: 81 TABLET, COATED ORAL at 08:29

## 2021-04-03 RX ADMIN — ATORVASTATIN CALCIUM 80 MG: 40 TABLET, FILM COATED ORAL at 22:05

## 2021-04-03 RX ADMIN — HALOPERIDOL 10 MG: 5 TABLET ORAL at 14:58

## 2021-04-03 RX ADMIN — DOCUSATE SODIUM 100 MG: 100 CAPSULE, LIQUID FILLED ORAL at 22:05

## 2021-04-03 RX ADMIN — LORAZEPAM 1 MG: 1 TABLET ORAL at 11:35

## 2021-04-03 ASSESSMENT — ACTIVITIES OF DAILY LIVING (ADL)
ADLS_ACUITY_SCORE: 11

## 2021-04-03 NOTE — PLAN OF CARE
"DATE & TIME:4/2/21 8509-0245  Cognitive Concerns/ Orientation : Alert to self.  Whispers/garbled speech. Suspicious/paranoid but calm  BEHAVIOR & AGGRESSION TOOL COLOR: green  CIWA SCORE: N/A    ABNL VS/O2: daily vitals  MOBILITY: IND in room.   PAIN MANAGMENT: Denied pain.   DIET: Regular. Good appetite. MD added snacks to orders.  BOWEL/BLADDER: Continent.   ABNL LAB/BG: Last full labs 02/06/21   DRAIN/DEVICES: no IV access.   SKIN: intact  TESTS/PROCEDURES: N/A  D/C DAY/GOALS/PLACE: Pending guardianship, SW following.   OTHER IMPORTANT INFO: On commitment hold Psych saw 4/1. Started vitamin E, increased cogentin for tardive dyskinesia. Slept well. Calm when he was awake. Asking about \"who stole my bag?\" Reassurance provided and accepted. Requested coffee x 1    "

## 2021-04-03 NOTE — PROGRESS NOTES
"[facility]    Medicine Progress Note - Hospitalist Service        Date of Admission:  9/9/2020  6:29 AM    Assessment & Plan:   55 year old male with PMHx of schizophrenia, hx of TBI, alcohol use disorder, COPD, hyperlipidemia and hypothyroidism who was admitted on 9/9/2020 for evaluation of aggressive behavior at his group home and now under commitment:      Neurocognitive disorder dt hx of TBI and alcohol use disorder  Schizophrenia  Under commitment  Had been residing in group home prior to admission.  Brought to hospital for evaluation of aggressive behaviors. Group home now unwilling to take him back. Has had numerous CODE 21s called this stay. Seen by psych, meds adjusted. Is currently under civil commitment and court hold through [facility] until 7/31/21.   -Continues on Haldol 10mg TID (timed for when patient tends to be most agitated), memantine 5mg daily, venlafaxine 75mg daily, benztropine 0.5mg BID   -prns available for agitation (including Haldol, Zyprexa and Ativan)  -SW following for placement (did check with [facility] inpatient psych, said he wasn't appropriate for their unit     Possible Tardive Dyskinesia  -- 12th psych consult  in last 6 months on 4/1/21, \"Possible TD\" noted (not previously mentioned, limited options per Psych)    Back cellulitis in 1/2021: Resolved  -Noted on 1/24, initiated on cephalexin at that time. Received local wound care and completed 7-day course of antibiotics on 1/30     Baseline Hypotension  -resolved     Hyperlipidemia  Chronic and stable on atorvastatin     COPD  Not O2 dependent.   -Chronic and stable on salmeterol, albuterol prn     Hypothyroidism  Chronic and stable on levothyroxine     Tobacco use disorder  Using nicotine patch and PRN gum     Resting tremor of upper extremities  No acute issues        Diet: Room Service  Diet  Combination Diet Regular Diet Adult; Safe Tray - with utensils  Snacks/Supplements Adult: Other; Bedtime snack; " Between Meals     DVT Prophylaxis: Pneumatic Compression Devices   Valentin Catheter: not present  Code Status: Full Code     Disposition Plan    Expected discharge: Awaiting placement, spoke with [facility] intake (phone [phone]) -- [facility] not able to take.     Deuce Turner MD  Pager: [phone]  Cell Phone:  [phone]   ______________________________________________________________________    Interval History    Asking when he can leave, as always, otherwise no new complaints.     Physical Exam   Vital signs:  Temp: 97.6  F (36.4  C) Temp src: Axillary BP: 94/63 Pulse: 79   Resp: 16 SpO2: 96 % O2 Device: None (Room air)   Height: 182.9 cm (6') Weight: 84.2 kg (185 lb 9.6 oz)  Estimated body mass index is 25.17 kg/m  as calculated from the following:    Height as of this encounter: 1.829 m (6').    Weight as of this encounter: 84.2 kg (185 lb 9.6 oz).      Wt Readings from Last 2 Encounters:   03/31/21 84.2 kg (185 lb 9.6 oz)   08/26/20 70.3 kg (155 lb)       Gen: well nourished male in NAD  Resp: lungs CTAB, no wheezes or crackles  CVS: RRR, normal s1/s2  Neuro- alert, cranial nerves grossly intact, some tongue fasciculations noted        Data   No lab results found in last 7 days.    No results found for this or any previous visit (from the past 24 hour(s)).  Medications       aspirin  81 mg Oral Daily     atorvastatin  80 mg Oral At Bedtime     benztropine  1 mg Oral BID     docusate sodium  100 mg Oral BID     haloperidol  10 mg Oral TID     levothyroxine  88 mcg Oral Daily     memantine  5 mg Oral Daily     salmeterol  1 puff Inhalation BID     venlafaxine  75 mg Oral Daily with breakfast     vitamin E  800 Units Oral Daily

## 2021-04-03 NOTE — PROGRESS NOTES
"[facility]    Medicine Progress Note - Hospitalist Service        Date of Admission:  9/9/2020  6:29 AM    Assessment & Plan:   55 year old male with PMHx of schizophrenia, hx of TBI, alcohol use disorder, COPD, hyperlipidemia and hypothyroidism who was admitted on 9/9/2020 for evaluation of aggressive behavior at his group home and now under commitment:      Neurocognitive disorder dt hx of TBI and alcohol use disorder  Schizophrenia  Under commitment  Had been residing in group home prior to admission.  Brought to hospital for evaluation of aggressive behaviors. Group home now unwilling to take him back. Has had numerous CODE 21s called this stay. Seen by psych, meds adjusted. Is currently under civil commitment and court hold through [facility] until 7/31/21.   -Continues on Haldol 10mg TID (timed for when patient tends to be most agitated), memantine 5mg daily, venlafaxine 75mg daily, benztropine 0.5mg BID   -prns available for agitation (including Haldol, Zyprexa and Ativan)  -SW following for placement     Possible Tardive Dyskinesia  -- 12th psych consult today in last 6 months, \"Possible TD\" noted (not previously mentioned, limited options per Psych    Back cellulitis in 1/2021: Resolved  -Noted on 1/24, initiated on cephalexin at that time. Received local wound care and completed 7-day course of antibiotics on 1/30     Baseline Hypotension  -resolved     Hyperlipidemia  Chronic and stable on atorvastatin     COPD  Not O2 dependent.   -Chronic and stable on salmeterol, albuterol prn     Hypothyroidism  Chronic and stable on levothyroxine     Tobacco use disorder  Using nicotine patch and PRN gum     Resting tremor of upper extremities  No acute issues        Diet: Room Service  Diet  Combination Diet Regular Diet Adult; Safe Tray - with utensils  Snacks/Supplements Adult: Other; Bedtime snack; Between Meals     DVT Prophylaxis: Pneumatic Compression Devices   Valentin Catheter: not " present  Code Status: Full Code     Disposition Plan    Expected discharge: Awaiting placement, spoke with [facility] intake (phone [phone]) -- [facility] not able to take.     Deuce Turner MD  Pager: [phone]  Cell Phone:  [phone]   ______________________________________________________________________    Interval History    No complaints -- wants bedtime snack.     Physical Exam   Vital signs:  Temp: 97.6  F (36.4  C) Temp src: Oral BP: 95/64 Pulse: 84   Resp: 16 SpO2: 99 % O2 Device: None (Room air)   Height: 182.9 cm (6') Weight: 84.2 kg (185 lb 9.6 oz)  Estimated body mass index is 25.17 kg/m  as calculated from the following:    Height as of this encounter: 1.829 m (6').    Weight as of this encounter: 84.2 kg (185 lb 9.6 oz).      Wt Readings from Last 2 Encounters:   03/31/21 84.2 kg (185 lb 9.6 oz)   08/26/20 70.3 kg (155 lb)       Gen: well nourished male in NAD  Resp: lungs CTAB, no wheezes or crackles  CVS: RRR, normal s1/s2  Neuro- alert, cranial nerves grossly intact, some tongue fasciculations noted        Data   No lab results found in last 7 days.    No results found for this or any previous visit (from the past 24 hour(s)).  Medications       aspirin  81 mg Oral Daily     atorvastatin  80 mg Oral At Bedtime     benztropine  1 mg Oral BID     docusate sodium  100 mg Oral BID     haloperidol  10 mg Oral TID     levothyroxine  88 mcg Oral Daily     memantine  5 mg Oral Daily     salmeterol  1 puff Inhalation BID     venlafaxine  75 mg Oral Daily with breakfast     vitamin E  800 Units Oral Daily

## 2021-04-03 NOTE — PLAN OF CARE
DATE & TIME:4/2/21 3-11pm shift  Cognitive Concerns/ Orientation : Alert to self.  Whispers/garbled speech. Was agitated at beginning of shift, calmed down after scheduled haldol  BEHAVIOR & AGGRESSION TOOL COLOR: Yellow, aggressively came out of room a few times wanting to leave to his mansions and slammed door.   CIWA SCORE: N/A    ABNL VS/O2: AM vitals were normal.  MOBILITY: IND in room.   PAIN MANAGMENT: Denied pain.   DIET: Regular. Good appetite. MD added snacks to orders.  BOWEL/BLADDER: Continent.   ABNL LAB/BG: Last full labs 02/06/21   DRAIN/DEVICES: no IV access.   SKIN: Pale/intact.  TESTS/PROCEDURES: N/A  D/C DAY/GOALS/PLACE: Pending guardianship, SW following.   OTHER IMPORTANT INFO: On commitment hold Psych saw yesterday. Started vitamin E, increased cogentin for tardive dyskinesia. PRN Ativan and Nicorette gum x1 on AM, declined gum on pm.

## 2021-04-03 NOTE — PLAN OF CARE
DATE & TIME:4/3/21 2808-2531  Cognitive Concerns/ Orientation : Alert to self.  Whispers/garbled speech. Suspicious/paranoid but calm  BEHAVIOR & AGGRESSION TOOL COLOR: green; ativan given x 1 for escalating anger  CIWA SCORE: N/A    ABNL VS/O2: daily vitals stable, BP soft  MOBILITY: IND in room. Ambulated in cruz x 2 with SBA  PAIN MANAGMENT: Denied pain.   DIET: Regular. Good appetite. BOWEL/BLADDER: Continent.   ABNL LAB/BG: Last full labs 02/06/21   DRAIN/DEVICES: no IV access.   SKIN: intact  TESTS/PROCEDURES: N/A  D/C DAY/GOALS/PLACE: Pending guardianship, then discharge to nursing facility; SW following.   OTHER IMPORTANT INFO: mild tongue movements; on Vit D.

## 2021-04-04 PROCEDURE — 250N000013 HC RX MED GY IP 250 OP 250 PS 637: Performed by: PSYCHIATRY & NEUROLOGY

## 2021-04-04 PROCEDURE — 250N000013 HC RX MED GY IP 250 OP 250 PS 637: Performed by: INTERNAL MEDICINE

## 2021-04-04 PROCEDURE — 250N000013 HC RX MED GY IP 250 OP 250 PS 637: Performed by: NURSE PRACTITIONER

## 2021-04-04 PROCEDURE — 99231 SBSQ HOSP IP/OBS SF/LOW 25: CPT | Performed by: INTERNAL MEDICINE

## 2021-04-04 PROCEDURE — 250N000013 HC RX MED GY IP 250 OP 250 PS 637: Performed by: HOSPITALIST

## 2021-04-04 PROCEDURE — 120N000001 HC R&B MED SURG/OB

## 2021-04-04 RX ADMIN — HALOPERIDOL 10 MG: 5 TABLET ORAL at 15:55

## 2021-04-04 RX ADMIN — LEVOTHYROXINE SODIUM 88 MCG: 88 TABLET ORAL at 08:37

## 2021-04-04 RX ADMIN — SALMETEROL XINAFOATE 1 PUFF: 50 POWDER, METERED ORAL; RESPIRATORY (INHALATION) at 22:36

## 2021-04-04 RX ADMIN — DOCUSATE SODIUM 100 MG: 100 CAPSULE, LIQUID FILLED ORAL at 08:38

## 2021-04-04 RX ADMIN — BENZTROPINE MESYLATE 1 MG: 0.5 TABLET ORAL at 08:37

## 2021-04-04 RX ADMIN — VENLAFAXINE HYDROCHLORIDE 75 MG: 75 CAPSULE, EXTENDED RELEASE ORAL at 08:37

## 2021-04-04 RX ADMIN — SALMETEROL XINAFOATE 1 PUFF: 50 POWDER, METERED ORAL; RESPIRATORY (INHALATION) at 08:41

## 2021-04-04 RX ADMIN — ASPIRIN 81 MG: 81 TABLET, COATED ORAL at 08:38

## 2021-04-04 RX ADMIN — Medication 800 UNITS: at 08:37

## 2021-04-04 RX ADMIN — ATORVASTATIN CALCIUM 80 MG: 40 TABLET, FILM COATED ORAL at 22:36

## 2021-04-04 RX ADMIN — BENZTROPINE MESYLATE 1 MG: 0.5 TABLET ORAL at 22:36

## 2021-04-04 RX ADMIN — DOCUSATE SODIUM 100 MG: 100 CAPSULE, LIQUID FILLED ORAL at 22:36

## 2021-04-04 RX ADMIN — NICOTINE POLACRILEX 2 MG: 2 GUM, CHEWING ORAL at 19:49

## 2021-04-04 RX ADMIN — LORAZEPAM 1 MG: 1 TABLET ORAL at 16:45

## 2021-04-04 RX ADMIN — HALOPERIDOL 10 MG: 5 TABLET ORAL at 08:38

## 2021-04-04 RX ADMIN — MEMANTINE 5 MG: 5 TABLET ORAL at 08:37

## 2021-04-04 RX ADMIN — HALOPERIDOL 10 MG: 5 TABLET ORAL at 22:36

## 2021-04-04 ASSESSMENT — ACTIVITIES OF DAILY LIVING (ADL)
ADLS_ACUITY_SCORE: 11

## 2021-04-04 NOTE — PLAN OF CARE
DATE & TIME:4/3/21 1647-4299  Cognitive Concerns/ Orientation : Alert to self.  Whispers/garbled speech. Suspicious/paranoid and agitated at times got prn ativan twice today.  BEHAVIOR & AGGRESSION TOOL COLOR: green/yellow, agitated  bout remaining in the hospital  CIWA SCORE: N/A    ABNL VS/O2: daily vitals stable, BP soft  MOBILITY: IND in room. Ambulated in cruz x 2 with SBA  PAIN MANAGMENT: Denied pain.   DIET: Regular. Good appetite.   BOWEL/BLADDER: Continent.   ABNL LAB/BG: Last full labs 02/06/21, weekly covid test negative  DRAIN/DEVICES: no IV access.   SKIN: intact  TESTS/PROCEDURES: N/A  D/C DAY/GOALS/PLACE: Pending guardianship, then discharge to nursing facility; SW following.   OTHER IMPORTANT INFO: mild tongue movements not observed this shift; on Vit D and cogentin.

## 2021-04-04 NOTE — PLAN OF CARE
DATE & TIME:4/3/21 3-11pm shift  Cognitive Concerns/ Orientation : Alert to self.  Whispers/garbled speech. Suspicious/paranoid and agitated at times got prn ativan twice today.  BEHAVIOR & AGGRESSION TOOL COLOR: green/yellow, agitated  bout remaining in the hospital  CIWA SCORE: N/A    ABNL VS/O2: daily vitals stable, BP soft  MOBILITY: IND in room. Ambulated in cruz x 2 with SBA  PAIN MANAGMENT: Denied pain.   DIET: Regular. Good appetite.   BOWEL/BLADDER: Continent.   ABNL LAB/BG: Last full labs 02/06/21   DRAIN/DEVICES: no IV access.   SKIN: intact  TESTS/PROCEDURES: N/A  D/C DAY/GOALS/PLACE: Pending guardianship, then discharge to nursing facility; SW following.   OTHER IMPORTANT INFO: mild tongue movements not observed this shift; on Vit D and cogentin.

## 2021-04-04 NOTE — PLAN OF CARE
DATE & TIME: 4/4/21 3560-2763   Cognitive Concerns/ Orientation : Alert to self  Whispers. Suspicious/paranoid and agitated at times.  BEHAVIOR & AGGRESSION TOOL COLOR: Green but yellow at times, gets agitated.  CIWA SCORE: N/A  ABNL VS/O2: Daily VSS  MOBILITY: Ind in room, stand by in halls.  PAIN MANAGMENT: Denies  DIET: Reg, assistance with ordering.  BOWEL/BLADDER: Continent  ABNL LAB/BG: COVID negative  DRAIN/DEVICES: No IV access   TELEMETRY RHYTHM: N/A  SKIN: Intact  TESTS/PROCEDURES:   D/C DATE: Pending guardianship, then discharge to nursing facility; SW following  OTHER IMPORTANT INFO: Needs frequent reorientation regarding his stay. Ambulated in halls one time this shift.

## 2021-04-04 NOTE — PROGRESS NOTES
[facility]    Hospitalist Progress Note    Assessment & Plan   John Juárez is a 55 year old male with PMHx of schizophrenia, hx of TBI, alcohol use disorder, COPD, hyperlipidemia and hypothyroidism who was admitted on 9/9/2020 for evaluation of aggressive behavior at his group home.       Was evaluated by Psychiatry and his condition stabilized, though his group home was unwilling to take him back and thus hospitalization has been prolonged awaiting new placement and guardianship. Under civil commitment through [facility]     Neurocognitive disorder dt hx of TBI and alcohol use disorder  Schizophrenia  Under commitment  Had been residing in group home prior to admission.  Brought to hospital for evaluation of aggressive behaviors. Group home now unwilling to take him back. Has had numerous CODE 21s called this stay. Seen by psych, meds adjusted. Is currently under civil commitment and court hold through [facility] until 7/31/21.   -- conts on Haldol 10mg TID (timed for when patient tends to be most agitated), memantine 5mg daily, venlafaxine 75mg daily, benztropine 1mg BID   -- prns available for agitation (including Haldol, Zyprexa and Ativan)  -- SW following for placement      Possible Tardive Dyskinesia vs EPSE  Per psych assessment on 4/1/21, noted to have possible tardive dyskinesia vs extrapyramidal side effects of meds. At that time, recommended to increase Cogentin to 1mg BID and add vitamin E 800 international unit(s) daily.     Back cellulitis in 1/2021: Resolved  Noted on 1/24/21, initiated on cephalexin at that time. Received local wound care and completed 7-day course of antibiotics on 1/30     Baseline Hypotension  BP 90-110s systolic on average. Asymptomatic. No concerns     Hyperlipidemia  Chronic and stable on llow dose ASA and  atorvastatin     COPD  Not O2 dependent. Chronic and stable on salmeterol, albuterol prn     Hypothyroidism  Chronic and stable on  levothyroxine     Tobacco use disorder  Using nicotine patch and PRN gum     Resting tremor of upper extremities  No acute issues     FEN: no IVFs, lytes stable, regular diet  DVT Prophylaxis: ambulate  Code Status: Full Code    Disposition: Discharge date unclear, pending guardianship. SW following    Luba Gimenez    Interval History   Seen this morning. Resting comfortably. No specific complaints.     -Data reviewed today: I reviewed all new labs and imaging results over the last 24 hours. I personally reviewed no images or EKG's today.    Physical Exam   Temp: 97.6  F (36.4  C) Temp src: Oral BP: 114/80 Pulse: 68   Resp: 16 SpO2: 96 % O2 Device: None (Room air)    Vitals:    12/11/20 0700 03/19/21 2120 03/31/21 1700   Weight: 82.6 kg (182 lb) 84.4 kg (186 lb) 84.2 kg (185 lb 9.6 oz)     Vital Signs with Ranges  Temp:  [97.6  F (36.4  C)] 97.6  F (36.4  C)  Pulse:  [68] 68  Resp:  [16] 16  BP: (114)/(80) 114/80  SpO2:  [96 %] 96 %  No intake/output data recorded.    Constitutional: Resting comfortably, answering basic questions appropriately but not overly conversant, NAD  Respiratory: CTAB, no wheeze/rales/rhonchi, no increased work of breathing  Cardiovascular: HRRR, no MGR, no LE edema  GI: S, NT, ND, +BS  Skin/Integumen: warm/dry  Other: +slight tongue fasiculations    Medications       aspirin  81 mg Oral Daily     atorvastatin  80 mg Oral At Bedtime     benztropine  1 mg Oral BID     docusate sodium  100 mg Oral BID     haloperidol  10 mg Oral TID     levothyroxine  88 mcg Oral Daily     memantine  5 mg Oral Daily     salmeterol  1 puff Inhalation BID     venlafaxine  75 mg Oral Daily with breakfast     vitamin E  800 Units Oral Daily       Data   No lab results found in last 7 days.    No results found for this or any previous visit (from the past 24 hour(s)).

## 2021-04-05 PROCEDURE — 250N000013 HC RX MED GY IP 250 OP 250 PS 637: Performed by: PSYCHIATRY & NEUROLOGY

## 2021-04-05 PROCEDURE — 250N000013 HC RX MED GY IP 250 OP 250 PS 637: Performed by: INTERNAL MEDICINE

## 2021-04-05 PROCEDURE — 99231 SBSQ HOSP IP/OBS SF/LOW 25: CPT | Performed by: HOSPITALIST

## 2021-04-05 PROCEDURE — 250N000013 HC RX MED GY IP 250 OP 250 PS 637: Performed by: HOSPITALIST

## 2021-04-05 PROCEDURE — 120N000001 HC R&B MED SURG/OB

## 2021-04-05 RX ADMIN — MEMANTINE 5 MG: 5 TABLET ORAL at 07:45

## 2021-04-05 RX ADMIN — VENLAFAXINE HYDROCHLORIDE 75 MG: 75 CAPSULE, EXTENDED RELEASE ORAL at 07:47

## 2021-04-05 RX ADMIN — Medication 800 UNITS: at 07:47

## 2021-04-05 RX ADMIN — SALMETEROL XINAFOATE 1 PUFF: 50 POWDER, METERED ORAL; RESPIRATORY (INHALATION) at 07:48

## 2021-04-05 RX ADMIN — LEVOTHYROXINE SODIUM 88 MCG: 88 TABLET ORAL at 07:47

## 2021-04-05 RX ADMIN — BENZTROPINE MESYLATE 1 MG: 0.5 TABLET ORAL at 22:02

## 2021-04-05 RX ADMIN — ATORVASTATIN CALCIUM 80 MG: 40 TABLET, FILM COATED ORAL at 22:02

## 2021-04-05 RX ADMIN — ASPIRIN 81 MG: 81 TABLET, COATED ORAL at 07:47

## 2021-04-05 RX ADMIN — BENZTROPINE MESYLATE 1 MG: 0.5 TABLET ORAL at 07:46

## 2021-04-05 RX ADMIN — HALOPERIDOL 10 MG: 5 TABLET ORAL at 07:47

## 2021-04-05 RX ADMIN — DOCUSATE SODIUM 100 MG: 100 CAPSULE, LIQUID FILLED ORAL at 22:02

## 2021-04-05 RX ADMIN — HALOPERIDOL 10 MG: 5 TABLET ORAL at 22:02

## 2021-04-05 RX ADMIN — OLANZAPINE 10 MG: 5 TABLET, ORALLY DISINTEGRATING ORAL at 16:29

## 2021-04-05 RX ADMIN — SALMETEROL XINAFOATE 1 PUFF: 50 POWDER, METERED ORAL; RESPIRATORY (INHALATION) at 22:03

## 2021-04-05 RX ADMIN — DOCUSATE SODIUM 100 MG: 100 CAPSULE, LIQUID FILLED ORAL at 07:46

## 2021-04-05 RX ADMIN — HALOPERIDOL 10 MG: 5 TABLET ORAL at 14:21

## 2021-04-05 ASSESSMENT — ACTIVITIES OF DAILY LIVING (ADL)
ADLS_ACUITY_SCORE: 11

## 2021-04-05 NOTE — PLAN OF CARE
DATE & TIME: 4/5/21 9464-4583  Cognitive Concerns/ Orientation : Alert to self  Whispers. Suspicious/paranoid  BEHAVIOR & AGGRESSION TOOL COLOR: Green but yellow at times, gets agitated.  CIWA SCORE: N/A  ABNL VS/O2: Daily VSS  MOBILITY: Independent in room, stand by in halls.  PAIN MANAGMENT: Denies  DIET: Regular-tolerating, assistance with ordering.  BOWEL/BLADDER: Continent  ABNL LAB/BG: NA  DRAIN/DEVICES: No IV access   TELEMETRY RHYTHM: N/A  SKIN: Intact  TESTS/PROCEDURES: none  D/C DATE: Pending guardianship, then discharge to group home; SW following.  OTHER IMPORTANT INFO: Needs frequent reorientation regarding his stay. Often sets door alarm off but is redirectable. Given zyprexa x1 for increasing agitation

## 2021-04-05 NOTE — PROGRESS NOTES
[facility]    Hospitalist Progress Note    Assessment & Plan   John Juárez is a 55 year old male with PMHx of schizophrenia, hx of TBI, alcohol use disorder, COPD, hyperlipidemia and hypothyroidism who was admitted on 9/9/2020 for evaluation of aggressive behavior at his group home.       Was evaluated by Psychiatry and his condition stabilized, though his group home was unwilling to take him back and thus hospitalization has been prolonged awaiting new placement and guardianship. Under civil commitment through [facility]     Neurocognitive disorder dt hx of TBI and alcohol use disorder  Schizophrenia  Under commitment  Had been residing in group home prior to admission.  Brought to hospital for evaluation of aggressive behaviors. Group home now unwilling to take him back. Has had numerous CODE 21s called this stay. Seen by psych, meds adjusted. Is currently under civil commitment and court hold through [facility] until 7/31/21.   -- conts on Haldol 10mg TID (timed for when patient tends to be most agitated), memantine 5mg daily, venlafaxine 75mg daily, benztropine 1mg BID   -- prns available for agitation (including Haldol, Zyprexa and Ativan)  -- SW following for placement      Possible Tardive Dyskinesia vs EPSE  Per psych assessment on 4/1/21, noted to have possible tardive dyskinesia vs extrapyramidal side effects of meds. At that time, recommended to increase Cogentin to 1mg BID and add vitamin E 800 international unit(s) daily.     Back cellulitis in 1/2021: Resolved  Noted on 1/24/21, initiated on cephalexin at that time. Received local wound care and completed 7-day course of antibiotics on 1/30     Baseline Hypotension  BP 90-110s systolic on average. Asymptomatic. No concerns     Hyperlipidemia  Chronic and stable on llow dose ASA and  atorvastatin     COPD  Not O2 dependent. Chronic and stable on salmeterol, albuterol prn     Hypothyroidism  Chronic and stable on  levothyroxine     Tobacco use disorder  Using nicotine patch and PRN gum     Resting tremor of upper extremities  No acute issues     FEN: no IVFs, lytes stable, regular diet  DVT Prophylaxis: ambulate  Code Status: Full Code    Disposition: Discharge date unclear, pending guardianship. SW following    Sushil Genao    Interval History   Feeling well, has no complaints.  No concerns per RN.     -Data reviewed today: I reviewed all new labs and imaging results over the last 24 hours. I personally reviewed no images or EKG's today.    Physical Exam   Temp: 98.1  F (36.7  C) Temp src: Oral BP: 104/69 Pulse: 69   Resp: 16 SpO2: 96 % O2 Device: None (Room air)    Vitals:    12/11/20 0700 03/19/21 2120 03/31/21 1700   Weight: 82.6 kg (182 lb) 84.4 kg (186 lb) 84.2 kg (185 lb 9.6 oz)     Vital Signs with Ranges  Temp:  [98.1  F (36.7  C)] 98.1  F (36.7  C)  Pulse:  [69] 69  Resp:  [16] 16  BP: (104)/(69) 104/69  SpO2:  [96 %] 96 %  No intake/output data recorded.    Constitutional: Well nourished male in NAD  Respiratory: respirations non-labored on room air  Cardiovascular:   Skin/Integumen:   Other:     Medications       aspirin  81 mg Oral Daily     atorvastatin  80 mg Oral At Bedtime     benztropine  1 mg Oral BID     docusate sodium  100 mg Oral BID     haloperidol  10 mg Oral TID     levothyroxine  88 mcg Oral Daily     memantine  5 mg Oral Daily     salmeterol  1 puff Inhalation BID     venlafaxine  75 mg Oral Daily with breakfast     vitamin E  800 Units Oral Daily       Data   No lab results found in last 7 days.    No results found for this or any previous visit (from the past 24 hour(s)).

## 2021-04-05 NOTE — PLAN OF CARE
DATE & TIME: 4/4/21 3-11pm shift     Cognitive Concerns/ Orientation : Alert to self  Whispers. Suspicious/paranoid and agitated at times. Fixated on discharge at this point. Ativan given x1 at 4:45 for agitation and slamming the door violently  BEHAVIOR & AGGRESSION TOOL COLOR: Green but yellow at times, gets agitated.  CIWA SCORE: N/A  ABNL VS/O2: Daily VSS  MOBILITY: Ind in room, stand by in halls.  PAIN MANAGMENT: Denies  DIET: Reg, assistance with ordering.  BOWEL/BLADDER: Continent  ABNL LAB/BG: COVID negative  DRAIN/DEVICES: No IV access   TELEMETRY RHYTHM: N/A  SKIN: Intact  TESTS/PROCEDURES: none  D/C DATE: Pending guardianship, then discharge to nursing facility; SW following  OTHER IMPORTANT INFO: Needs frequent reorientation regarding his stay. Ambulating helps at times.

## 2021-04-05 NOTE — PLAN OF CARE
DATE & TIME: 4/4/21 3191-0739  Cognitive Concerns/ Orientation : Alert to self  Whispers. Suspicious/paranoid, slept well this shift.  BEHAVIOR & AGGRESSION TOOL COLOR: Green but yellow at times, gets agitated.  CIWA SCORE: N/A  ABNL VS/O2: Daily VSS  MOBILITY: Ind in room, stand by in halls.  PAIN MANAGMENT: Denies  DIET: Reg, assistance with ordering.  BOWEL/BLADDER: Continent  ABNL LAB/BG: COVID negative  DRAIN/DEVICES: No IV access   TELEMETRY RHYTHM: N/A  SKIN: Intact  TESTS/PROCEDURES: none  D/C DATE: Pending guardianship, then discharge to nursing facility; SW following  OTHER IMPORTANT INFO: Needs frequent reorientation regarding his stay. Ambulating helps at times.

## 2021-04-06 PROCEDURE — 120N000001 HC R&B MED SURG/OB

## 2021-04-06 PROCEDURE — 250N000013 HC RX MED GY IP 250 OP 250 PS 637: Performed by: PSYCHIATRY & NEUROLOGY

## 2021-04-06 PROCEDURE — 99231 SBSQ HOSP IP/OBS SF/LOW 25: CPT | Performed by: HOSPITALIST

## 2021-04-06 PROCEDURE — 250N000013 HC RX MED GY IP 250 OP 250 PS 637: Performed by: INTERNAL MEDICINE

## 2021-04-06 PROCEDURE — 250N000013 HC RX MED GY IP 250 OP 250 PS 637: Performed by: HOSPITALIST

## 2021-04-06 RX ADMIN — LEVOTHYROXINE SODIUM 88 MCG: 88 TABLET ORAL at 08:00

## 2021-04-06 RX ADMIN — HALOPERIDOL 10 MG: 5 TABLET ORAL at 14:27

## 2021-04-06 RX ADMIN — HALOPERIDOL 10 MG: 5 TABLET ORAL at 20:03

## 2021-04-06 RX ADMIN — LORAZEPAM 1 MG: 1 TABLET ORAL at 03:20

## 2021-04-06 RX ADMIN — LORAZEPAM 1 MG: 1 TABLET ORAL at 16:05

## 2021-04-06 RX ADMIN — Medication 800 UNITS: at 08:00

## 2021-04-06 RX ADMIN — ASPIRIN 81 MG: 81 TABLET, COATED ORAL at 08:00

## 2021-04-06 RX ADMIN — HALOPERIDOL 10 MG: 5 TABLET ORAL at 08:00

## 2021-04-06 RX ADMIN — DOCUSATE SODIUM 100 MG: 100 CAPSULE, LIQUID FILLED ORAL at 20:03

## 2021-04-06 RX ADMIN — DOCUSATE SODIUM 100 MG: 100 CAPSULE, LIQUID FILLED ORAL at 08:00

## 2021-04-06 RX ADMIN — VENLAFAXINE HYDROCHLORIDE 75 MG: 75 CAPSULE, EXTENDED RELEASE ORAL at 08:00

## 2021-04-06 RX ADMIN — BENZTROPINE MESYLATE 1 MG: 0.5 TABLET ORAL at 20:04

## 2021-04-06 RX ADMIN — HALOPERIDOL 5 MG: 5 TABLET ORAL at 17:15

## 2021-04-06 RX ADMIN — HALOPERIDOL 5 MG: 5 TABLET ORAL at 02:50

## 2021-04-06 RX ADMIN — BENZTROPINE MESYLATE 1 MG: 0.5 TABLET ORAL at 08:00

## 2021-04-06 RX ADMIN — ATORVASTATIN CALCIUM 80 MG: 40 TABLET, FILM COATED ORAL at 20:04

## 2021-04-06 RX ADMIN — MEMANTINE 5 MG: 5 TABLET ORAL at 08:00

## 2021-04-06 ASSESSMENT — ACTIVITIES OF DAILY LIVING (ADL)
ADLS_ACUITY_SCORE: 11

## 2021-04-06 NOTE — PROGRESS NOTES
House NP note    I responded to code 21.  By the time of my arrival pt had already been verbally redirected back to his room (per RN he was attempting to elope, wants to leave, wanted to call police.)  He had been agreeable to taking prn Haldol.  Pt was getting back into bed, door alarm was set and staff will have sitter positioned immediately outside of room.  I did not see pt pt.    No charge note    Viviane Donald CNP  Hospitalist - [facility]  [phone]     Text Page

## 2021-04-06 NOTE — PROGRESS NOTES
Code 21 called initially as patient was trying to leave, got to stairwell. 4 staff members were able to escort patient back to his room without being violent. Then security arrived. Re-educated patient on plan of care and patient took PRN haldol. He is still upset but now laying in bed. No assistance needed from code team. Will have long arm sitter sit outside of room.

## 2021-04-06 NOTE — PLAN OF CARE
DATE & TIME: 4/5/21 1900-0730  Cognitive Concerns/ Orientation : Alert to self  Whispers. Suspicious/paranoid  BEHAVIOR & AGGRESSION TOOL COLOR: Green but yellow at times, gets agitated.  CIWA SCORE: N/A  ABNL VS/O2: Daily VSS  MOBILITY: Independent in room, stand by in halls.  PAIN MANAGMENT: Denies  DIET: Regular-tolerating, assistance with ordering.  BOWEL/BLADDER: Continent  ABNL LAB/BG: COVID negative  DRAIN/DEVICES: No IV access   TELEMETRY RHYTHM: N/A  SKIN: Intact  TESTS/PROCEDURES: none  D/C DATE: Pending guardianship, then discharge to nursing facility; SW following.  OTHER IMPORTANT INFO: Needs frequent reorientation regarding his stay. Often sets door alarm off but is redirectable. Did not sleep well overnight, prn haldol 5mg x1 and prn ativan x1 with little relief.

## 2021-04-06 NOTE — PROGRESS NOTES
[facility]    Hospitalist Progress Note    Assessment & Plan   John Juárez is a 55 year old male with PMHx of schizophrenia, hx of TBI, alcohol use disorder, COPD, hyperlipidemia and hypothyroidism who was admitted on 9/9/2020 for evaluation of aggressive behavior at his group home.       Was evaluated by Psychiatry and his condition stabilized, though his group home was unwilling to take him back and thus hospitalization has been prolonged awaiting new placement and guardianship. Under civil commitment through [facility]     Neurocognitive disorder dt hx of TBI and alcohol use disorder  Schizophrenia  Under commitment  Had been residing in group home prior to admission.  Brought to hospital for evaluation of aggressive behaviors. Group home now unwilling to take him back. Has had numerous CODE 21s called this stay. Seen by psych, meds adjusted. Is currently under civil commitment and court hold through [facility] until 7/31/21.   -- conts on Haldol 10mg TID (timed for when patient tends to be most agitated), memantine 5mg daily, venlafaxine 75mg daily, benztropine 1mg BID   -- prns available for agitation (including Haldol, Zyprexa and Ativan)  -- SW following for placement      Possible Tardive Dyskinesia vs EPSE  Per psych assessment on 4/1/21, noted to have possible tardive dyskinesia vs extrapyramidal side effects of meds. At that time, recommended to increase Cogentin to 1mg BID and add vitamin E 800 international unit(s) daily.     Back cellulitis in 1/2021: Resolved  Noted on 1/24/21, initiated on cephalexin at that time. Received local wound care and completed 7-day course of antibiotics on 1/30     Baseline Hypotension  BP 90-110s systolic on average. Asymptomatic. No concerns     Hyperlipidemia  Chronic and stable on llow dose ASA and  atorvastatin     COPD  Not O2 dependent. Chronic and stable on salmeterol, albuterol prn     Hypothyroidism  Chronic and stable on  levothyroxine     Tobacco use disorder  Using nicotine patch and PRN gum     Resting tremor of upper extremities  No acute issues     FEN: no IVFs, lytes stable, regular diet  DVT Prophylaxis: ambulate  Code Status: Full Code    Disposition: Discharge date unclear, pending guardianship. SW following    Sushil Genao    Interval History   No complaints, asking when he will discharge.     -Data reviewed today: I reviewed all new labs and imaging results over the last 24 hours. I personally reviewed no images or EKG's today.    Physical Exam   Temp: 98  F (36.7  C) Temp src: Oral BP: 113/84 Pulse: 70   Resp: 16 SpO2: 98 % O2 Device: None (Room air)    Vitals:    12/11/20 0700 03/19/21 2120 03/31/21 1700   Weight: 82.6 kg (182 lb) 84.4 kg (186 lb) 84.2 kg (185 lb 9.6 oz)     Vital Signs with Ranges  Temp:  [98  F (36.7  C)] 98  F (36.7  C)  Pulse:  [70] 70  Resp:  [16] 16  BP: (113)/(84) 113/84  SpO2:  [98 %] 98 %  I/O last 3 completed shifts:  In: 1160 [P.O.:1160]  Out: -     Constitutional: Well nourished male in NAD  Respiratory: lungs CTAB, no wheezes or crackles, no tachypnea  Cardiovascular: RRR, normal s1/s2 without murmur  Skin/Integumen:   Other: Cranial nerves grossly intact    Medications       aspirin  81 mg Oral Daily     atorvastatin  80 mg Oral At Bedtime     benztropine  1 mg Oral BID     docusate sodium  100 mg Oral BID     haloperidol  10 mg Oral TID     levothyroxine  88 mcg Oral Daily     memantine  5 mg Oral Daily     salmeterol  1 puff Inhalation BID     venlafaxine  75 mg Oral Daily with breakfast     vitamin E  800 Units Oral Daily       Data   No lab results found in last 7 days.    No results found for this or any previous visit (from the past 24 hour(s)).

## 2021-04-06 NOTE — PLAN OF CARE
"DATE & TIME: 4/6/21 9867-0053  Cognitive Concerns/ Orientation : Alert to self  Whispers. Suspicious/paranoid at times  BEHAVIOR & AGGRESSION TOOL COLOR: Green but yellow at times, gets agitated.  CIWA SCORE: N/A  ABNL VS/O2: Daily VSS on RA  MOBILITY: Independent in room, stand by in halls; ambulated  PAIN MANAGMENT: Denies  DIET: Regular-tolerating, assistance with ordering.  BOWEL/BLADDER: Continent  ABNL LAB/BG: NA  DRAIN/DEVICES: No IV access   TELEMETRY RHYTHM: N/A  SKIN: Intact  TESTS/PROCEDURES: none  D/C DATE: Pending guardianship, then discharge to group home; SW following.  OTHER IMPORTANT INFO: Needs frequent reorientation regarding his stay. Often sets door alarm off, but is redirectable and goes back into room. Occasionally slams room door but has not been aggressive towards staff. Often asks \"when the F#*% am I getting out of here\". Given ativan x1 to help with anxiety/agitation in evening. MD aware pt is more \"active/restless\" and sets door alarm off more in the evening, plan to continue with scheduled doses of haldol and give PRN's. If patient becomes agitated and not redirectable, could re-consult psych.     "

## 2021-04-07 PROCEDURE — 250N000013 HC RX MED GY IP 250 OP 250 PS 637: Performed by: INTERNAL MEDICINE

## 2021-04-07 PROCEDURE — 120N000001 HC R&B MED SURG/OB

## 2021-04-07 PROCEDURE — 99231 SBSQ HOSP IP/OBS SF/LOW 25: CPT | Performed by: HOSPITALIST

## 2021-04-07 PROCEDURE — 250N000013 HC RX MED GY IP 250 OP 250 PS 637: Performed by: HOSPITALIST

## 2021-04-07 PROCEDURE — 250N000013 HC RX MED GY IP 250 OP 250 PS 637: Performed by: PSYCHIATRY & NEUROLOGY

## 2021-04-07 RX ADMIN — Medication 800 UNITS: at 10:08

## 2021-04-07 RX ADMIN — ATORVASTATIN CALCIUM 80 MG: 40 TABLET, FILM COATED ORAL at 22:25

## 2021-04-07 RX ADMIN — DOCUSATE SODIUM 100 MG: 100 CAPSULE, LIQUID FILLED ORAL at 10:08

## 2021-04-07 RX ADMIN — LEVOTHYROXINE SODIUM 88 MCG: 88 TABLET ORAL at 10:08

## 2021-04-07 RX ADMIN — BENZTROPINE MESYLATE 1 MG: 0.5 TABLET ORAL at 10:08

## 2021-04-07 RX ADMIN — SALMETEROL XINAFOATE 1 PUFF: 50 POWDER, METERED ORAL; RESPIRATORY (INHALATION) at 10:09

## 2021-04-07 RX ADMIN — MEMANTINE 5 MG: 5 TABLET ORAL at 10:08

## 2021-04-07 RX ADMIN — HALOPERIDOL 10 MG: 5 TABLET ORAL at 15:48

## 2021-04-07 RX ADMIN — VENLAFAXINE HYDROCHLORIDE 75 MG: 75 CAPSULE, EXTENDED RELEASE ORAL at 10:08

## 2021-04-07 RX ADMIN — HALOPERIDOL 10 MG: 5 TABLET ORAL at 10:08

## 2021-04-07 RX ADMIN — DOCUSATE SODIUM 100 MG: 100 CAPSULE, LIQUID FILLED ORAL at 22:24

## 2021-04-07 RX ADMIN — ASPIRIN 81 MG: 81 TABLET, COATED ORAL at 10:08

## 2021-04-07 RX ADMIN — BENZTROPINE MESYLATE 1 MG: 0.5 TABLET ORAL at 22:24

## 2021-04-07 RX ADMIN — HALOPERIDOL 10 MG: 5 TABLET ORAL at 22:24

## 2021-04-07 ASSESSMENT — ACTIVITIES OF DAILY LIVING (ADL)
ADLS_ACUITY_SCORE: 13
ADLS_ACUITY_SCORE: 13
ADLS_ACUITY_SCORE: 11
ADLS_ACUITY_SCORE: 13
ADLS_ACUITY_SCORE: 11
ADLS_ACUITY_SCORE: 11

## 2021-04-07 NOTE — PLAN OF CARE
Cognitive Concerns/ Orientation : Pt A/O x1 self, sometimes place. Whispers.   BEHAVIOR & AGGRESSION TOOL COLOR: Yellow,easily agitated, but redirectable.   ABNL VS/O2: Once daily. WDL yesterday.   MOBILITY: Independent  PAIN MANAGMENT:denies  DIET: Regular  BOWEL/BLADDER: Continent  DRAIN/DEVICES: No IV access  SKIN: Intact  D/C DATE: Discharge pending guardianship  OTHER IMPORTANT INFO: Frequent re-orientation needed, door alarm.     Past Medical History:    COPD  Heart Disease  Hypertension  Substance abuse  TBI  Psychosis  Alcohol abuse  Hypokalemia  Cerebrovascular disease  Macrocytic anemia  Aortic valve insufficiency  Alcohol induced liver disorder  Infectious endocarditis  Tobacco abuse  Alcohol-induced chronic pancreatitis  Hepatitis C virus infection   Depressive disorder  Macrocytic anemia  HFrEF  Dyslipidemia  Pneumococcal bacteremia     Slept intermittently this shift. Redirectable.

## 2021-04-07 NOTE — PLAN OF CARE
DATE & TIME: 4/6/21 1900-2300    Cognitive Concerns/ Orientation : Pt A/Ox1, whispers. Upset this evening wants to go to  movie theater.   BEHAVIOR & AGGRESSION TOOL COLOR: Yellow, pt slamming door when we tell him he can't leave   ABNL VS/O2: VSS on RA  MOBILITY: Independent  PAIN MANAGMENT: Denies  DIET: Regular  BOWEL/BLADDER: Continent  DRAIN/DEVICES: No IV access  SKIN: Intact  D/C DATE: Discharge pending guardianship  OTHER IMPORTANT INFO: Frequent orientation needed, door alarm.

## 2021-04-07 NOTE — PLAN OF CARE
Cognitive Concerns/ Orientation : Alert to self  Whispers. Suspicious/paranoid  BEHAVIOR & AGGRESSION TOOL COLOR: Green but yellow at times, gets agitated.  CIWA SCORE: N/A  ABNL VS/O2: Daily VSS  MOBILITY: Independent in room, stand by in halls.  PAIN MANAGMENT: Denies  DIET: Regular-tolerating, assistance with ordering.  BOWEL/BLADDER: Continent  ABNL LAB/BG: NA  DRAIN/DEVICES: No IV access   TELEMETRY RHYTHM: N/A  SKIN: Intact  TESTS/PROCEDURES: none  D/C DATE: Pending guardianship, then discharge to group home; SW following.  OTHER IMPORTANT INFO: Needs frequent reorientation regarding his stay. Often sets door alarm off but is redirectable.   Late entry

## 2021-04-07 NOTE — PLAN OF CARE
DATE & TIME: 04/06/21 1414-3241    Cognitive Concerns/ Orientation: A & O x1 self, sometimes place. Whispers.   BEHAVIOR & AGGRESSION TOOL COLOR: Yellow, pt slamming door when we tell him he can't leave.   ABNL VS/O2: Once daily. WDL yesterday.   MOBILITY: Independent  PAIN MANAGMENT: Slept soundly  DIET: Regular  BOWEL/BLADDER: Continent  DRAIN/DEVICES: No IV access  SKIN: Intact  D/C DATE: Discharge pending guardianship  OTHER IMPORTANT INFO: Frequent re-orientation needed, door alarm. Sitter outside room. Slept or was laying down all night except one time, gave juice and went back to bed. Stable no changes over night.

## 2021-04-07 NOTE — PROGRESS NOTES
[facility]    Hospitalist Progress Note    Assessment & Plan   John Juárez is a 55 year old male with PMHx of schizophrenia, hx of TBI, alcohol use disorder, COPD, hyperlipidemia and hypothyroidism who was admitted on 9/9/2020 for evaluation of aggressive behavior at his group home.       Was evaluated by Psychiatry and his condition stabilized, though his group home was unwilling to take him back and thus hospitalization has been prolonged awaiting new placement and guardianship. Under civil commitment through [facility]     Neurocognitive disorder dt hx of TBI and alcohol use disorder  Schizophrenia  Under commitment  Had been residing in group home prior to admission.  Brought to hospital for evaluation of aggressive behaviors. Group home now unwilling to take him back. Has had numerous CODE 21s called this stay. Seen by psych, meds adjusted. Is currently under civil commitment and court hold through [facility] until 7/31/21.   -- conts on Haldol 10mg TID (timed for when patient tends to be most agitated), memantine 5mg daily, venlafaxine 75mg daily, benztropine 1mg BID   -- prns available for agitation (including Haldol, Zyprexa and Ativan)  -- SW following for placement      Possible Tardive Dyskinesia vs EPSE  Per psych assessment on 4/1/21, noted to have possible tardive dyskinesia vs extrapyramidal side effects of meds. At that time, recommended to increase Cogentin to 1mg BID and add vitamin E 800 international unit(s) daily.     Back cellulitis in 1/2021: Resolved  Noted on 1/24/21, initiated on cephalexin at that time. Received local wound care and completed 7-day course of antibiotics on 1/30     Baseline Hypotension  BP 90-110s systolic on average. Asymptomatic. No concerns     Hyperlipidemia  Chronic and stable on llow dose ASA and  atorvastatin     COPD  Not O2 dependent. Chronic and stable on salmeterol, albuterol prn     Hypothyroidism  Chronic and stable on  levothyroxine     Tobacco use disorder  Using nicotine patch and PRN gum     Resting tremor of upper extremities  No acute issues     FEN: no IVFs, lytes stable, regular diet  DVT Prophylaxis: ambulate  Code Status: Full Code    Disposition: Discharge date unclear, pending guardianship. SW following    Sushil Genao    Interval History   Sleeping soundly at time of visit and did not attempt to arouse.      Code 21 called last evening but has been calm and cooperative today per RN.     -Data reviewed today: I reviewed all new labs and imaging results over the last 24 hours. I personally reviewed no images or EKG's today.    Physical Exam   Temp: 98.2  F (36.8  C) Temp src: Oral BP: 128/65 Pulse: 82   Resp: 16 SpO2: 95 % O2 Device: None (Room air)    Vitals:    12/11/20 0700 03/19/21 2120 03/31/21 1700   Weight: 82.6 kg (182 lb) 84.4 kg (186 lb) 84.2 kg (185 lb 9.6 oz)     Vital Signs with Ranges  Temp:  [98.2  F (36.8  C)] 98.2  F (36.8  C)  Pulse:  [82] 82  Resp:  [16] 16  BP: (128)/(65) 128/65  SpO2:  [95 %] 95 %  I/O last 3 completed shifts:  In: 1040 [P.O.:1040]  Out: -     Constitutional: Well nourished male in NAD, sleeping comfortably in bed  Respiratory: respirations non-labored on room air, no tachypnea  Cardiovascular:   Skin/Integumen:   Other:     Medications       aspirin  81 mg Oral Daily     atorvastatin  80 mg Oral At Bedtime     benztropine  1 mg Oral BID     docusate sodium  100 mg Oral BID     haloperidol  10 mg Oral TID     levothyroxine  88 mcg Oral Daily     memantine  5 mg Oral Daily     salmeterol  1 puff Inhalation BID     venlafaxine  75 mg Oral Daily with breakfast     vitamin E  800 Units Oral Daily       Data   No lab results found in last 7 days.    No results found for this or any previous visit (from the past 24 hour(s)).

## 2021-04-08 PROCEDURE — 120N000001 HC R&B MED SURG/OB

## 2021-04-08 PROCEDURE — 250N000013 HC RX MED GY IP 250 OP 250 PS 637: Performed by: PSYCHIATRY & NEUROLOGY

## 2021-04-08 PROCEDURE — 87635 SARS-COV-2 COVID-19 AMP PRB: CPT | Performed by: HOSPITALIST

## 2021-04-08 PROCEDURE — 250N000013 HC RX MED GY IP 250 OP 250 PS 637: Performed by: HOSPITALIST

## 2021-04-08 PROCEDURE — 250N000013 HC RX MED GY IP 250 OP 250 PS 637: Performed by: INTERNAL MEDICINE

## 2021-04-08 PROCEDURE — 99231 SBSQ HOSP IP/OBS SF/LOW 25: CPT | Performed by: HOSPITALIST

## 2021-04-08 RX ADMIN — ATORVASTATIN CALCIUM 80 MG: 40 TABLET, FILM COATED ORAL at 21:40

## 2021-04-08 RX ADMIN — HALOPERIDOL 10 MG: 5 TABLET ORAL at 15:22

## 2021-04-08 RX ADMIN — VENLAFAXINE HYDROCHLORIDE 75 MG: 75 CAPSULE, EXTENDED RELEASE ORAL at 09:51

## 2021-04-08 RX ADMIN — HALOPERIDOL 10 MG: 5 TABLET ORAL at 09:51

## 2021-04-08 RX ADMIN — BENZTROPINE MESYLATE 1 MG: 0.5 TABLET ORAL at 09:51

## 2021-04-08 RX ADMIN — HALOPERIDOL 10 MG: 5 TABLET ORAL at 21:41

## 2021-04-08 RX ADMIN — LEVOTHYROXINE SODIUM 88 MCG: 88 TABLET ORAL at 09:51

## 2021-04-08 RX ADMIN — BENZTROPINE MESYLATE 1 MG: 0.5 TABLET ORAL at 21:41

## 2021-04-08 RX ADMIN — MEMANTINE 5 MG: 5 TABLET ORAL at 09:51

## 2021-04-08 RX ADMIN — DOCUSATE SODIUM 100 MG: 100 CAPSULE, LIQUID FILLED ORAL at 21:41

## 2021-04-08 RX ADMIN — Medication 800 UNITS: at 09:51

## 2021-04-08 RX ADMIN — ASPIRIN 81 MG: 81 TABLET, COATED ORAL at 09:51

## 2021-04-08 RX ADMIN — SALMETEROL XINAFOATE 1 PUFF: 50 POWDER, METERED ORAL; RESPIRATORY (INHALATION) at 09:51

## 2021-04-08 RX ADMIN — DOCUSATE SODIUM 100 MG: 100 CAPSULE, LIQUID FILLED ORAL at 09:52

## 2021-04-08 ASSESSMENT — ACTIVITIES OF DAILY LIVING (ADL)
ADLS_ACUITY_SCORE: 13

## 2021-04-08 NOTE — PLAN OF CARE
DATE & TIME: 4/7/21 0670-3153    Cognitive Concerns/ Orientation : Pt A/Ox1, oriented to self   BEHAVIOR & AGGRESSION TOOL COLOR: Yellow, pt calm and cooperative this shift   ABNL VS/O2: Daily  MOBILITY: Independent  PAIN MANAGMENT: Denies  DIET: Regular  BOWEL/BLADDER: Continent  SKIN: Intact  D/C DATE: Discharge pending guardianship. Group home has been found  OTHER IMPORTANT INFO: Door alarm in place

## 2021-04-08 NOTE — PROGRESS NOTES
[facility]    Hospitalist Progress Note    Assessment & Plan   John Juárez is a 55 year old male with PMHx of schizophrenia, hx of TBI, alcohol use disorder, COPD, hyperlipidemia and hypothyroidism who was admitted on 9/9/2020 for evaluation of aggressive behavior at his group home.       Was evaluated by Psychiatry and his condition stabilized, though his group home was unwilling to take him back and thus hospitalization has been prolonged awaiting new placement and guardianship. Under civil commitment through [facility]     Neurocognitive disorder dt hx of TBI and alcohol use disorder  Schizophrenia  Under commitment  Had been residing in group home prior to admission.  Brought to hospital for evaluation of aggressive behaviors. Group home now unwilling to take him back. Has had numerous CODE 21s called this stay. Seen by psych, meds adjusted. Is currently under civil commitment and court hold through [facility] until 7/31/21.   -- conts on Haldol 10mg TID (timed for when patient tends to be most agitated), memantine 5mg daily, venlafaxine 75mg daily, benztropine 1mg BID   -- prns available for agitation (including Haldol, Zyprexa and Ativan)  -- SW following for placement      Possible Tardive Dyskinesia vs EPSE  Per psych assessment on 4/1/21, noted to have possible tardive dyskinesia vs extrapyramidal side effects of meds. At that time, recommended to increase Cogentin to 1mg BID and add vitamin E 800 international unit(s) daily.     Back cellulitis in 1/2021: Resolved  Noted on 1/24/21, initiated on cephalexin at that time. Received local wound care and completed 7-day course of antibiotics on 1/30     Baseline Hypotension  BP 90-110s systolic on average. Asymptomatic. No concerns     Hyperlipidemia  Chronic and stable on llow dose ASA and  atorvastatin     COPD  Not O2 dependent. Chronic and stable on salmeterol, albuterol prn     Hypothyroidism  Chronic and stable on  levothyroxine     Tobacco use disorder  Using nicotine patch and PRN gum     Resting tremor of upper extremities  No acute issues     FEN: no IVFs, lytes stable, regular diet  DVT Prophylaxis: ambulate  Code Status: Full Code    Disposition: Discharge date unclear, pending guardianship. SW following    Sushil Genao    Interval History   Ambulating around nursing station looking for cards at time of visit.  Denies any pain or other complaints, offers no concerns other than wanting to go home.     -Data reviewed today: I reviewed all new labs and imaging results over the last 24 hours. I personally reviewed no images or EKG's today.    Physical Exam   Temp: 97.6  F (36.4  C) Temp src: Oral BP: 108/63 Pulse: 76   Resp: 16 SpO2: 96 % O2 Device: None (Room air)    Vitals:    12/11/20 0700 03/19/21 2120 03/31/21 1700   Weight: 82.6 kg (182 lb) 84.4 kg (186 lb) 84.2 kg (185 lb 9.6 oz)     Vital Signs with Ranges  Temp:  [97.5  F (36.4  C)-97.7  F (36.5  C)] 97.6  F (36.4  C)  Pulse:  [75-76] 76  Resp:  [16] 16  BP: (106-126)/(63-92) 108/63  SpO2:  [96 %] 96 %  I/O last 3 completed shifts:  In: 760 [P.O.:760]  Out: -     Constitutional: Well nourished male in NAD, ambulating around nursing station  Respiratory: respirations non-labored on room air, no tachypnea  Cardiovascular:   Skin/Integumen:   Other: Cranial nerves grossly intact    Medications       aspirin  81 mg Oral Daily     atorvastatin  80 mg Oral At Bedtime     benztropine  1 mg Oral BID     docusate sodium  100 mg Oral BID     haloperidol  10 mg Oral TID     levothyroxine  88 mcg Oral Daily     memantine  5 mg Oral Daily     salmeterol  1 puff Inhalation BID     venlafaxine  75 mg Oral Daily with breakfast     vitamin E  800 Units Oral Daily       Data   No lab results found in last 7 days.    No results found for this or any previous visit (from the past 24 hour(s)).

## 2021-04-08 NOTE — PLAN OF CARE
Cognitive Concerns/ Orientation : Pt A/Ox1, oriented to self   BEHAVIOR & AGGRESSION TOOL COLOR: Yellow, pt calm and cooperative this shift   ABNL VS/O2: Daily  MOBILITY: Independent  PAIN MANAGMENT: Denies  DIET: Regular  BOWEL/BLADDER: Continent  SKIN: Intact  D/C DATE: Discharge pending guardianship. Group home has been found.  --COVID swab resent today  OTHER IMPORTANT INFO: Door alarm in place

## 2021-04-09 PROCEDURE — 250N000013 HC RX MED GY IP 250 OP 250 PS 637: Performed by: PSYCHIATRY & NEUROLOGY

## 2021-04-09 PROCEDURE — 250N000013 HC RX MED GY IP 250 OP 250 PS 637: Performed by: HOSPITALIST

## 2021-04-09 PROCEDURE — 120N000001 HC R&B MED SURG/OB

## 2021-04-09 PROCEDURE — 99231 SBSQ HOSP IP/OBS SF/LOW 25: CPT | Performed by: HOSPITALIST

## 2021-04-09 PROCEDURE — 250N000013 HC RX MED GY IP 250 OP 250 PS 637: Performed by: INTERNAL MEDICINE

## 2021-04-09 RX ADMIN — BENZTROPINE MESYLATE 1 MG: 0.5 TABLET ORAL at 09:39

## 2021-04-09 RX ADMIN — HALOPERIDOL 10 MG: 5 TABLET ORAL at 15:35

## 2021-04-09 RX ADMIN — MEMANTINE 5 MG: 5 TABLET ORAL at 09:39

## 2021-04-09 RX ADMIN — Medication 800 UNITS: at 09:39

## 2021-04-09 RX ADMIN — DOCUSATE SODIUM 100 MG: 100 CAPSULE, LIQUID FILLED ORAL at 09:38

## 2021-04-09 RX ADMIN — SALMETEROL XINAFOATE 1 PUFF: 50 POWDER, METERED ORAL; RESPIRATORY (INHALATION) at 20:16

## 2021-04-09 RX ADMIN — ATORVASTATIN CALCIUM 80 MG: 40 TABLET, FILM COATED ORAL at 20:16

## 2021-04-09 RX ADMIN — ASPIRIN 81 MG: 81 TABLET, COATED ORAL at 09:39

## 2021-04-09 RX ADMIN — HALOPERIDOL 10 MG: 5 TABLET ORAL at 07:48

## 2021-04-09 RX ADMIN — VENLAFAXINE HYDROCHLORIDE 75 MG: 75 CAPSULE, EXTENDED RELEASE ORAL at 07:48

## 2021-04-09 RX ADMIN — DOCUSATE SODIUM 100 MG: 100 CAPSULE, LIQUID FILLED ORAL at 20:16

## 2021-04-09 RX ADMIN — SALMETEROL XINAFOATE 1 PUFF: 50 POWDER, METERED ORAL; RESPIRATORY (INHALATION) at 15:37

## 2021-04-09 RX ADMIN — LEVOTHYROXINE SODIUM 88 MCG: 88 TABLET ORAL at 07:49

## 2021-04-09 RX ADMIN — BENZTROPINE MESYLATE 1 MG: 0.5 TABLET ORAL at 20:16

## 2021-04-09 RX ADMIN — HALOPERIDOL 10 MG: 5 TABLET ORAL at 20:16

## 2021-04-09 ASSESSMENT — ACTIVITIES OF DAILY LIVING (ADL)
ADLS_ACUITY_SCORE: 11
ADLS_ACUITY_SCORE: 11
ADLS_ACUITY_SCORE: 13

## 2021-04-09 NOTE — PROGRESS NOTES
[facility]    Hospitalist Progress Note    Assessment & Plan   John Juárez is a 55 year old male with PMHx of schizophrenia, hx of TBI, alcohol use disorder, COPD, hyperlipidemia and hypothyroidism who was admitted on 9/9/2020 for evaluation of aggressive behavior at his group home.       Was evaluated by Psychiatry and his condition stabilized, though his group home was unwilling to take him back and thus hospitalization has been prolonged awaiting new placement and guardianship. Under civil commitment through [facility]     Neurocognitive disorder dt hx of TBI and alcohol use disorder  Schizophrenia  Under commitment  Had been residing in group home prior to admission.  Brought to hospital for evaluation of aggressive behaviors. Group home now unwilling to take him back. Has had numerous CODE 21s called this stay. Seen by psych, meds adjusted. Is currently under civil commitment and court hold through [facility] until 7/31/21.   -- conts on Haldol 10mg TID (timed for when patient tends to be most agitated), memantine 5mg daily, venlafaxine 75mg daily, benztropine 1mg BID   -- prns available for agitation (including Haldol, Zyprexa and Ativan)  -- SW following for placement      Possible Tardive Dyskinesia vs EPSE  Per psych assessment on 4/1/21, noted to have possible tardive dyskinesia vs extrapyramidal side effects of meds. At that time, recommended to increase Cogentin to 1mg BID and add vitamin E 800 international unit(s) daily.     Back cellulitis in 1/2021: Resolved  Noted on 1/24/21, initiated on cephalexin at that time. Received local wound care and completed 7-day course of antibiotics on 1/30     Baseline Hypotension  BP 90-110s systolic on average. Asymptomatic. No concerns     Hyperlipidemia  Chronic and stable on llow dose ASA and  atorvastatin     COPD  Not O2 dependent. Chronic and stable on salmeterol, albuterol prn     Hypothyroidism  Chronic and stable on  levothyroxine     Tobacco use disorder  Using nicotine patch and PRN gum     Resting tremor of upper extremities  No acute issues     FEN: no IVFs, lytes stable, regular diet  DVT Prophylaxis: ambulate  Code Status: Full Code    Disposition: Discharge date unclear, pending guardianship. SW following    Sushil Genao    Interval History   Denies any pain, offers no other complaints.  States he wants to go home.     -Data reviewed today: I reviewed all new labs and imaging results over the last 24 hours. I personally reviewed no images or EKG's today.    Physical Exam   Temp: 97.6  F (36.4  C) Temp src: Oral BP: 108/63 Pulse: 76   Resp: 16 SpO2: 96 % O2 Device: None (Room air)    Vitals:    12/11/20 0700 03/19/21 2120 03/31/21 1700   Weight: 82.6 kg (182 lb) 84.4 kg (186 lb) 84.2 kg (185 lb 9.6 oz)     Vital Signs with Ranges  Temp:  [97.6  F (36.4  C)] 97.6  F (36.4  C)  Pulse:  [76] 76  Resp:  [16] 16  BP: (108)/(63) 108/63  SpO2:  [96 %] 96 %  I/O last 3 completed shifts:  In: 760 [P.O.:760]  Out: -     Refused formal exam 4/9/21  Constitutional: Well nourished male in NAD, sitting up in chair  Respiratory: respirations non-labored on room air, no tachypnea  Cardiovascular:   Skin/Integumen:   Other: Cranial nerves grossly intact    Medications       aspirin  81 mg Oral Daily     atorvastatin  80 mg Oral At Bedtime     benztropine  1 mg Oral BID     docusate sodium  100 mg Oral BID     haloperidol  10 mg Oral TID     levothyroxine  88 mcg Oral Daily     memantine  5 mg Oral Daily     salmeterol  1 puff Inhalation BID     venlafaxine  75 mg Oral Daily with breakfast     vitamin E  800 Units Oral Daily       Data   No lab results found in last 7 days.    No results found for this or any previous visit (from the past 24 hour(s)).

## 2021-04-09 NOTE — PLAN OF CARE
DATE & TIME: 4/9/21 0700 -19   Cognitive Concerns/ Orientation : Pt A/Ox1, oriented to self.  BEHAVIOR & AGGRESSION TOOL COLOR: Yellow, calm and cooperative this shift, came out to desk just a few times asking for his clothes and a cigarette. Speech is soft/whispery.   ABNL VS/O2: VSS on RA, daily  MOBILITY: Independent  PAIN MANAGMENT: Denies  DIET: Regular  BOWEL/BLADDER: Continent  SKIN: Intact  D/C DATE: Discharge pending guardianship. Group home found.  OTHER IMPORTANT INFO: Ambulating in [place] with staff..door alarm on.

## 2021-04-09 NOTE — PLAN OF CARE
DATE & TIME: 4/8/21 1343-9014    Cognitive Concerns/ Orientation : Pt A/Ox1, oriented to self.  BEHAVIOR & AGGRESSION TOOL COLOR: Yellow, calm and cooperative this shift   ABNL VS/O2: VSS on RA, daily  MOBILITY: Independent  PAIN MANAGMENT: Denies  DIET: Regular  BOWEL/BLADDER: Continent  SKIN: Intact  D/C DATE: Discharge pending guardianship. Group home found.  OTHER IMPORTANT INFO: Ambulating in [place] with staff this evening.

## 2021-04-10 PROCEDURE — 250N000013 HC RX MED GY IP 250 OP 250 PS 637: Performed by: INTERNAL MEDICINE

## 2021-04-10 PROCEDURE — 120N000001 HC R&B MED SURG/OB

## 2021-04-10 PROCEDURE — 250N000013 HC RX MED GY IP 250 OP 250 PS 637: Performed by: PSYCHIATRY & NEUROLOGY

## 2021-04-10 PROCEDURE — 99231 SBSQ HOSP IP/OBS SF/LOW 25: CPT | Performed by: INTERNAL MEDICINE

## 2021-04-10 PROCEDURE — 250N000013 HC RX MED GY IP 250 OP 250 PS 637: Performed by: HOSPITALIST

## 2021-04-10 RX ADMIN — SALMETEROL XINAFOATE 1 PUFF: 50 POWDER, METERED ORAL; RESPIRATORY (INHALATION) at 21:26

## 2021-04-10 RX ADMIN — HALOPERIDOL 10 MG: 5 TABLET ORAL at 21:19

## 2021-04-10 RX ADMIN — HALOPERIDOL 10 MG: 5 TABLET ORAL at 08:12

## 2021-04-10 RX ADMIN — MEMANTINE 5 MG: 5 TABLET ORAL at 08:12

## 2021-04-10 RX ADMIN — DOCUSATE SODIUM 100 MG: 100 CAPSULE, LIQUID FILLED ORAL at 08:12

## 2021-04-10 RX ADMIN — ATORVASTATIN CALCIUM 80 MG: 40 TABLET, FILM COATED ORAL at 21:19

## 2021-04-10 RX ADMIN — LEVOTHYROXINE SODIUM 88 MCG: 88 TABLET ORAL at 08:12

## 2021-04-10 RX ADMIN — VENLAFAXINE HYDROCHLORIDE 75 MG: 75 CAPSULE, EXTENDED RELEASE ORAL at 08:12

## 2021-04-10 RX ADMIN — DOCUSATE SODIUM 100 MG: 100 CAPSULE, LIQUID FILLED ORAL at 21:19

## 2021-04-10 RX ADMIN — HALOPERIDOL 10 MG: 5 TABLET ORAL at 15:05

## 2021-04-10 RX ADMIN — BENZTROPINE MESYLATE 1 MG: 0.5 TABLET ORAL at 21:19

## 2021-04-10 RX ADMIN — Medication 800 UNITS: at 08:12

## 2021-04-10 RX ADMIN — SALMETEROL XINAFOATE 1 PUFF: 50 POWDER, METERED ORAL; RESPIRATORY (INHALATION) at 08:17

## 2021-04-10 RX ADMIN — BENZTROPINE MESYLATE 1 MG: 0.5 TABLET ORAL at 08:12

## 2021-04-10 RX ADMIN — ASPIRIN 81 MG: 81 TABLET, COATED ORAL at 08:12

## 2021-04-10 ASSESSMENT — ACTIVITIES OF DAILY LIVING (ADL)
ADLS_ACUITY_SCORE: 11

## 2021-04-10 NOTE — PLAN OF CARE
DATE & TIME: 04/10/21 0256-3196    Cognitive Concerns/ Orientation : Alert to self. Confused.   BEHAVIOR & AGGRESSION TOOL COLOR: Green, redirectable.  CIWA SCORE: NA   ABNL VS/O2: Daily VS- stable  MOBILITY: independent in room  PAIN MANAGMENT: Denies pain this shift  DIET: Regular, tolerating  BOWEL/BLADDER: Continent of bowel and bladder  ABNL LAB/BG:NA  DRAIN/DEVICES: NA  TELEMETRY RHYTHM: NA  SKIN: Refused full assessment  TESTS/PROCEDURES: NA  D/C DATE: Pending placement  OTHER IMPORTANT INFO:

## 2021-04-10 NOTE — PLAN OF CARE
DATE & TIME: 4/9/21 7P-7A  Cognitive Concerns/ Orientation : Alert, oriented to self  BEHAVIOR & AGGRESSION TOOL COLOR: Yellow, calm and cooperative this shift  ABNL VS/O2: VSS on room air  MOBILITY: Independent in room, SBA in halls  PAIN MANAGMENT: Denies  DIET: Regular  BOWEL/BLADDER: Continent  SKIN: Intact  D/C DATE: Discharge pending guardianship, has [facility] placement  OTHER IMPORTANT INFO: showered on  evening, door alarm active, lung sounds clear

## 2021-04-10 NOTE — PROGRESS NOTES
[facility]    Medicine Progress Note - Hospitalist Service       Date of Admission:  9/9/2020  Assessment & Plan       John Juárez is a 55 year old male with PMHx of schizophrenia, hx of TBI, alcohol use disorder, COPD, hyperlipidemia and hypothyroidism who was admitted on 9/9/2020 for evaluation of aggressive behavior at his group home.       Was evaluated by Psychiatry and his condition stabilized, though his group home was unwilling to take him back and thus hospitalization has been prolonged awaiting new placement and guardianship. Under civil commitment through [facility]     Neurocognitive disorder dt hx of TBI and alcohol use disorder  Schizophrenia  Under commitment  Had been residing in group home prior to admission.  Brought to hospital for evaluation of aggressive behaviors. Group home now unwilling to take him back. Has had numerous CODE 21s called this stay. Seen by psych, meds adjusted. Is currently under civil commitment and court hold through [facility] until 7/31/21.   -- conts on Haldol 10mg TID (timed for when patient tends to be most agitated), memantine 5mg daily, venlafaxine 75mg daily, benztropine 1mg BID   -- prns available for agitation (including Haldol, Zyprexa and Ativan)  -- SW following for placement      Possible Tardive Dyskinesia vs EPSE  Per psych assessment on 4/1/21, noted to have possible tardive dyskinesia vs extrapyramidal side effects of meds. At that time, recommended to increase Cogentin to 1mg BID and add vitamin E 800 international unit(s) daily.      Back cellulitis in 1/2021: Resolved  Noted on 1/24/21, initiated on cephalexin at that time. Received local wound care and completed 7-day course of antibiotics on 1/30     Baseline Hypotension  BP 90-110s systolic on average. Asymptomatic. No concerns     Hyperlipidemia  Chronic and stable on llow dose ASA and  atorvastatin     COPD  Not O2 dependent. Chronic and stable on salmeterol,  albuterol prn     Hypothyroidism  Chronic and stable on levothyroxine     Tobacco use disorder  Using nicotine patch and PRN gum     Resting tremor of upper extremities  No acute issues           Diet: Room Service  Diet  Combination Diet Regular Diet Adult; Safe Tray - with utensils  Snacks/Supplements Adult: Other; Bedtime snack; Between Meals    DVT Prophylaxis: Pneumatic Compression Devices  Valentin Catheter: not present  Code Status: Full Code           Disposition Plan   Expected discharge: when placement found    Entered: Olive Mi MD 04/10/2021, 5:37 PM       The patient's care was discussed with the Bedside Nurse and Patient.    Olive Mi MD  Hospitalist Service  [facility]  Contact information available via [facility] Paging/Directory    ______________________________________________________________________    Interval History   Patient without any new complaints , seems confused.No new nursing concerns     Data reviewed today: I reviewed all medications, new labs and imaging results over the last 24 hours.    Physical Exam   Vital Signs: Temp: 97.6  F (36.4  C) Temp src: Oral BP: 109/75 Pulse: 79   Resp: 16 SpO2: 97 % O2 Device: None (Room air)    Weight: 185 lbs 9.6 oz  Exam:  Constitutional: Awake, alert and no distress. Appears comfortable  Head: Normocephalic. No masses, lesions, tenderness or abnormalities  ENT: ENT exam normal, no neck nodes or sinus tenderness  Cardiovascular: RRR.  No  murmurs, no rubs or JVD  Respiratory:normal WOB,b/l equal air entry, no wheezes or crackles   Gastrointestinal: Abdomen soft, non-tender. BS normal. No masses, organomegaly  : Deferred  Extremities :no edema , no clubbing or cyanosis    Neurologic: Cranial nerves II-XII grossly intact , gait normal      Data   No lab results found in last 7 days.    No results found for this or any previous visit (from the past 24 hour(s)).  Medications       aspirin  81 mg Oral Daily     atorvastatin   80 mg Oral At Bedtime     benztropine  1 mg Oral BID     docusate sodium  100 mg Oral BID     haloperidol  10 mg Oral TID     levothyroxine  88 mcg Oral Daily     memantine  5 mg Oral Daily     salmeterol  1 puff Inhalation BID     venlafaxine  75 mg Oral Daily with breakfast     vitamin E  800 Units Oral Daily

## 2021-04-10 NOTE — PLAN OF CARE
"DATE & TIME: 4/10/21 AM shift  Cognitive Concerns/ Orientation : Alert, oriented to self  BEHAVIOR & AGGRESSION TOOL COLOR:Green, redirectable. Comes out of his room; asks about \"if he can smoke here\". Thank goes back into his room  ABNL VS/O2: VSS on room air  MOBILITY: Independent in room, SBA in halls  PAIN MANAGMENT: Denies  DIET: Regular  BOWEL/BLADDER: Continent  SKIN: refuses full skin check.   D/C DATE: Discharge pending guardianship, has GH placement. SW is following.   OTHER IMPORTANT INFO: door alarm active   "

## 2021-04-11 PROCEDURE — 250N000013 HC RX MED GY IP 250 OP 250 PS 637: Performed by: INTERNAL MEDICINE

## 2021-04-11 PROCEDURE — 99231 SBSQ HOSP IP/OBS SF/LOW 25: CPT | Performed by: INTERNAL MEDICINE

## 2021-04-11 PROCEDURE — 250N000013 HC RX MED GY IP 250 OP 250 PS 637: Performed by: PSYCHIATRY & NEUROLOGY

## 2021-04-11 PROCEDURE — 120N000001 HC R&B MED SURG/OB

## 2021-04-11 PROCEDURE — 250N000013 HC RX MED GY IP 250 OP 250 PS 637: Performed by: HOSPITALIST

## 2021-04-11 RX ADMIN — VENLAFAXINE HYDROCHLORIDE 75 MG: 75 CAPSULE, EXTENDED RELEASE ORAL at 08:49

## 2021-04-11 RX ADMIN — DOCUSATE SODIUM 100 MG: 100 CAPSULE, LIQUID FILLED ORAL at 20:43

## 2021-04-11 RX ADMIN — LEVOTHYROXINE SODIUM 88 MCG: 88 TABLET ORAL at 08:49

## 2021-04-11 RX ADMIN — SALMETEROL XINAFOATE 1 PUFF: 50 POWDER, METERED ORAL; RESPIRATORY (INHALATION) at 20:43

## 2021-04-11 RX ADMIN — DOCUSATE SODIUM 100 MG: 100 CAPSULE, LIQUID FILLED ORAL at 08:49

## 2021-04-11 RX ADMIN — ATORVASTATIN CALCIUM 80 MG: 40 TABLET, FILM COATED ORAL at 20:43

## 2021-04-11 RX ADMIN — BENZTROPINE MESYLATE 1 MG: 0.5 TABLET ORAL at 20:43

## 2021-04-11 RX ADMIN — ASPIRIN 81 MG: 81 TABLET, COATED ORAL at 08:49

## 2021-04-11 RX ADMIN — HALOPERIDOL 10 MG: 5 TABLET ORAL at 14:09

## 2021-04-11 RX ADMIN — HALOPERIDOL 10 MG: 5 TABLET ORAL at 20:43

## 2021-04-11 RX ADMIN — Medication 800 UNITS: at 08:48

## 2021-04-11 RX ADMIN — HALOPERIDOL 10 MG: 5 TABLET ORAL at 08:49

## 2021-04-11 RX ADMIN — MEMANTINE 5 MG: 5 TABLET ORAL at 08:49

## 2021-04-11 RX ADMIN — SALMETEROL XINAFOATE 1 PUFF: 50 POWDER, METERED ORAL; RESPIRATORY (INHALATION) at 08:53

## 2021-04-11 RX ADMIN — BENZTROPINE MESYLATE 1 MG: 0.5 TABLET ORAL at 08:49

## 2021-04-11 ASSESSMENT — ACTIVITIES OF DAILY LIVING (ADL)
ADLS_ACUITY_SCORE: 11

## 2021-04-11 NOTE — PLAN OF CARE
DATE & TIME: 4/11/21 AM shift  Cognitive Concerns/ Orientation : Alert, oriented to self  BEHAVIOR & AGGRESSION TOOL COLOR:Green, redirectable. Comes out of his room at times. Has door alram.   ABNL VS/O2: VSS on room air  MOBILITY: Independent in room, SBA in halls  PAIN MANAGMENT: Denies  DIET: Regular; good appetite.   BOWEL/BLADDER: Continent  SKIN: refuses full skin check.   D/C DATE: Discharge pending guardianship, has GH placement. SW is following.   OTHER IMPORTANT INFO: door alarm active

## 2021-04-11 NOTE — PROGRESS NOTES
[facility]    Medicine Progress Note - Hospitalist Service       Date of Admission:  9/9/2020  Assessment & Plan       John Juárez is a 55 year old male with PMHx of schizophrenia, hx of TBI, alcohol use disorder, COPD, hyperlipidemia and hypothyroidism who was admitted on 9/9/2020 for evaluation of aggressive behavior at his group home.       Was evaluated by Psychiatry and his condition stabilized, though his group home was unwilling to take him back and thus hospitalization has been prolonged awaiting new placement and guardianship. Under civil commitment through [facility]     Neurocognitive disorder dt hx of TBI and alcohol use disorder  Schizophrenia  Under commitment  Had been residing in group home prior to admission.  Brought to hospital for evaluation of aggressive behaviors. Group home now unwilling to take him back. Has had numerous CODE 21s called this stay. Seen by psych, meds adjusted. Is currently under civil commitment and court hold through [facility] until 7/31/21.   -- conts on Haldol 10mg TID (timed for when patient tends to be most agitated), memantine 5mg daily, venlafaxine 75mg daily, benztropine 1mg BID   -- prns available for agitation (including Haldol, Zyprexa and Ativan)  -- SW following for placement      Possible Tardive Dyskinesia vs EPSE  Per psych assessment on 4/1/21, noted to have possible tardive dyskinesia vs extrapyramidal side effects of meds. At that time, recommended to increase Cogentin to 1mg BID and add vitamin E 800 international unit(s) daily.      Back cellulitis in 1/2021: Resolved  Noted on 1/24/21, initiated on cephalexin at that time. Received local wound care and completed 7-day course of antibiotics on 1/30     Baseline Hypotension  BP 90-110s systolic on average. Asymptomatic. No concerns     Hyperlipidemia  Chronic and stable on llow dose ASA and  atorvastatin     COPD  Not O2 dependent. Chronic and stable on salmeterol,  albuterol prn     Hypothyroidism  Chronic and stable on levothyroxine     Tobacco use disorder  Using nicotine patch and PRN gum     Resting tremor of upper extremities  No acute issues           Diet: Room Service  Diet  Combination Diet Regular Diet Adult; Safe Tray - with utensils  Snacks/Supplements Adult: Other; Bedtime snack; Between Meals    DVT Prophylaxis: Pneumatic Compression Devices  Valentin Catheter: not present  Code Status: Full Code           Disposition Plan   Expected discharge: when placement found    Entered: Olive Mi MD 04/11/2021, 2:48 PM       The patient's care was discussed with the Bedside Nurse and Patient.    Olive Mi MD  Hospitalist Service  [facility]  Contact information available via [facility] Paging/Directory    ______________________________________________________________________    Interval History   Patient was sleeping at the time of my evaluation, when awoken he was cooperative, follows command, went back to sleep again and did not want to be bothered.  No new nursing concerns    Data reviewed today: I reviewed all medications, new labs and imaging results over the last 24 hours.    Physical Exam   Vital Signs: Temp: 97.3  F (36.3  C) Temp src: Oral BP: 96/68 Pulse: 83   Resp: 16 SpO2: 98 % O2 Device: None (Room air)    Weight: 185 lbs 9.6 oz  Exam:  Constitutional: Awake, alert and no distress. Appears comfortable  Head: Normocephalic. No masses, lesions, tenderness or abnormalities  ENT: ENT exam normal, no neck nodes or sinus tenderness  Cardiovascular: RRR.  No  murmurs, no rubs or JVD  Respiratory:normal WOB,b/l equal air entry, no wheezes or crackles   Gastrointestinal: Abdomen soft, non-tender. BS normal. No masses, organomegaly  : Deferred  Extremities :no edema , no clubbing or cyanosis    Neurologic: Cranial nerves II-XII grossly intact , gait normal      Data   No lab results found in last 7 days.    No results found for this or any  previous visit (from the past 24 hour(s)).  Medications       aspirin  81 mg Oral Daily     atorvastatin  80 mg Oral At Bedtime     benztropine  1 mg Oral BID     docusate sodium  100 mg Oral BID     haloperidol  10 mg Oral TID     levothyroxine  88 mcg Oral Daily     memantine  5 mg Oral Daily     salmeterol  1 puff Inhalation BID     venlafaxine  75 mg Oral Daily with breakfast     vitamin E  800 Units Oral Daily

## 2021-04-11 NOTE — PLAN OF CARE
DATE & TIME: 04/10/21 7P-7A   Cognitive Concerns/ Orientation : Alert, oriented to self  BEHAVIOR & AGGRESSION TOOL COLOR: Hx of yellow but calm/cooperative this shift. Redirectable.  ABNL VS/O2: VSS on room air  MOBILITY: Independent in room, walks with staff in hallway   PAIN MANAGMENT: Denies    DIET: Regular  BOWEL/BLADDER: Continent  ABNL LAB/BG: NA  DRAIN/DEVICES: NA  SKIN: Refused full assessment  TESTS/PROCEDURES: NA  D/C DATE: Pending guardianship  Discharge Barriers: Guardianship   OTHER IMPORTANT INFO: Door alarm active

## 2021-04-12 PROCEDURE — 250N000013 HC RX MED GY IP 250 OP 250 PS 637: Performed by: INTERNAL MEDICINE

## 2021-04-12 PROCEDURE — 120N000001 HC R&B MED SURG/OB

## 2021-04-12 PROCEDURE — 99231 SBSQ HOSP IP/OBS SF/LOW 25: CPT | Performed by: INTERNAL MEDICINE

## 2021-04-12 PROCEDURE — 250N000013 HC RX MED GY IP 250 OP 250 PS 637: Performed by: HOSPITALIST

## 2021-04-12 PROCEDURE — 250N000013 HC RX MED GY IP 250 OP 250 PS 637: Performed by: PSYCHIATRY & NEUROLOGY

## 2021-04-12 RX ADMIN — DOCUSATE SODIUM 100 MG: 100 CAPSULE, LIQUID FILLED ORAL at 20:05

## 2021-04-12 RX ADMIN — Medication 800 UNITS: at 09:41

## 2021-04-12 RX ADMIN — SALMETEROL XINAFOATE 1 PUFF: 50 POWDER, METERED ORAL; RESPIRATORY (INHALATION) at 09:42

## 2021-04-12 RX ADMIN — HALOPERIDOL 10 MG: 5 TABLET ORAL at 09:41

## 2021-04-12 RX ADMIN — HALOPERIDOL 10 MG: 5 TABLET ORAL at 15:59

## 2021-04-12 RX ADMIN — ASPIRIN 81 MG: 81 TABLET, COATED ORAL at 09:42

## 2021-04-12 RX ADMIN — LEVOTHYROXINE SODIUM 88 MCG: 88 TABLET ORAL at 09:42

## 2021-04-12 RX ADMIN — MEMANTINE 5 MG: 5 TABLET ORAL at 09:42

## 2021-04-12 RX ADMIN — OLANZAPINE 10 MG: 5 TABLET, ORALLY DISINTEGRATING ORAL at 22:02

## 2021-04-12 RX ADMIN — VENLAFAXINE HYDROCHLORIDE 75 MG: 75 CAPSULE, EXTENDED RELEASE ORAL at 09:41

## 2021-04-12 RX ADMIN — DOCUSATE SODIUM 100 MG: 100 CAPSULE, LIQUID FILLED ORAL at 09:42

## 2021-04-12 RX ADMIN — ATORVASTATIN CALCIUM 80 MG: 40 TABLET, FILM COATED ORAL at 20:05

## 2021-04-12 RX ADMIN — BENZTROPINE MESYLATE 1 MG: 0.5 TABLET ORAL at 09:42

## 2021-04-12 RX ADMIN — SALMETEROL XINAFOATE 1 PUFF: 50 POWDER, METERED ORAL; RESPIRATORY (INHALATION) at 20:07

## 2021-04-12 RX ADMIN — BENZTROPINE MESYLATE 1 MG: 0.5 TABLET ORAL at 20:05

## 2021-04-12 RX ADMIN — HALOPERIDOL 10 MG: 5 TABLET ORAL at 20:05

## 2021-04-12 ASSESSMENT — ACTIVITIES OF DAILY LIVING (ADL)
ADLS_ACUITY_SCORE: 11

## 2021-04-12 NOTE — PLAN OF CARE
DATE & TIME: 4/12/21 1300  Cognitive Concerns/ Orientation : Alert, oriented to self. Reorientation provided.   BEHAVIOR & AGGRESSION TOOL COLOR: Hx of yellow but green this shift, redirectable.    ABNL VS/O2: Daily VSS on room air  MOBILITY: Independent in room, SBA in halls  PAIN MANAGMENT: Denies  DIET: Regular; good appetite.   BOWEL/BLADDER: Continent  SKIN: intact  D/C DATE: Discharge pending guardianship  OTHER IMPORTANT INFO: door alarm active

## 2021-04-12 NOTE — PROGRESS NOTES
[facility]    Medicine Progress Note - Hospitalist Service       Date of Admission:  9/9/2020  Assessment & Plan       John Juárez is a 55 year old male with PMHx of schizophrenia, hx of TBI, alcohol use disorder, COPD, hyperlipidemia and hypothyroidism who was admitted on 9/9/2020 for evaluation of aggressive behavior at his group home.       Was evaluated by Psychiatry and his condition stabilized, though his group home was unwilling to take him back and thus hospitalization has been prolonged awaiting new placement and guardianship. Under civil commitment through [facility]     Neurocognitive disorder dt hx of TBI and alcohol use disorder  Schizophrenia  Under commitment  Had been residing in group home prior to admission.  Brought to hospital for evaluation of aggressive behaviors. Group home now unwilling to take him back. Has had numerous CODE 21s called this stay. Seen by psych, meds adjusted. Is currently under civil commitment and court hold through [facility] until 7/31/21.   -- conts on Haldol 10mg TID (timed for when patient tends to be most agitated), memantine 5mg daily, venlafaxine 75mg daily, benztropine 1mg BID   -- prns available for agitation (including Haldol, Zyprexa and Ativan)  -- SW following for placement      Possible Tardive Dyskinesia vs EPSE  Per psych assessment on 4/1/21, noted to have possible tardive dyskinesia vs extrapyramidal side effects of meds. At that time, recommended to increase Cogentin to 1mg BID and add vitamin E 800 international unit(s) daily.      Back cellulitis in 1/2021: Resolved  Noted on 1/24/21, initiated on cephalexin at that time. Received local wound care and completed 7-day course of antibiotics on 1/30     Baseline Hypotension  BP 90-110s systolic on average. Asymptomatic. No concerns     Hyperlipidemia  Chronic and stable on llow dose ASA and  atorvastatin     COPD  Not O2 dependent. Chronic and stable on salmeterol,  albuterol prn     Hypothyroidism  Chronic and stable on levothyroxine     Tobacco use disorder  Using nicotine patch and PRN gum     Resting tremor of upper extremities  No acute issues           Diet: Room Service  Diet  Combination Diet Regular Diet Adult; Safe Tray - with utensils  Snacks/Supplements Adult: Other; Bedtime snack; Between Meals    DVT Prophylaxis: Pneumatic Compression Devices  Valentin Catheter: not present  Code Status: Full Code           Disposition Plan   Expected discharge: when placement found    Entered: Olive Mi MD 04/12/2021, 3:11 PM       The patient's care was discussed with the Bedside Nurse and Patient.    Olive Mi MD  Hospitalist Service  [facility]  Contact information available via [facility] Paging/Directory    ______________________________________________________________________    Interval History   Patient without any new complaints.  No new nursing concerns.  Did not want to be bothered much.    Data reviewed today: I reviewed all medications, new labs and imaging results over the last 24 hours.    Physical Exam   Vital Signs:                    Weight: 185 lbs 9.6 oz  Exam:  Constitutional: Awake, alert and no distress. Appears comfortable  Head: Normocephalic. No masses, lesions, tenderness or abnormalities  ENT: ENT exam normal, no neck nodes or sinus tenderness  Cardiovascular: RRR.  No  murmurs, no rubs or JVD  Respiratory:normal WOB,b/l equal air entry, no wheezes or crackles   Gastrointestinal: Abdomen soft, non-tender. BS normal. No masses, organomegaly  : Deferred  Extremities :no edema , no clubbing or cyanosis    Neurologic: Cranial nerves II-XII grossly intact , gait normal      Data   No lab results found in last 7 days.    No results found for this or any previous visit (from the past 24 hour(s)).  Medications       aspirin  81 mg Oral Daily     atorvastatin  80 mg Oral At Bedtime     benztropine  1 mg Oral BID     docusate sodium   100 mg Oral BID     haloperidol  10 mg Oral TID     levothyroxine  88 mcg Oral Daily     memantine  5 mg Oral Daily     salmeterol  1 puff Inhalation BID     venlafaxine  75 mg Oral Daily with breakfast     vitamin E  800 Units Oral Daily

## 2021-04-12 NOTE — PLAN OF CARE
DATE & TIME: 4/11/21 Night  Cognitive Concerns/ Orientation : Alert, oriented to self. Reorientation provided.   BEHAVIOR & AGGRESSION TOOL COLOR: Hx of yellow but green this shift, redirectable.    ABNL VS/O2: Daily VSS on room air  MOBILITY: Independent in room, SBA in halls  PAIN MANAGMENT: Denies  DIET: Regular; good appetite.   BOWEL/BLADDER: Continent  SKIN: intact  D/C DATE: Discharge pending guardianship  OTHER IMPORTANT INFO: door alarm active

## 2021-04-12 NOTE — PLAN OF CARE
DATE & TIME: 4/11/21 1900-2330 shift  Cognitive Concerns/ Orientation : Alert, oriented to self  BEHAVIOR & AGGRESSION TOOL COLOR:Green, redirectable. Comes out of his room at times. Has door alram.   ABNL VS/O2: VSS on room air  MOBILITY: Independent in room, SBA in halls  PAIN MANAGMENT: Denies  DIET: Regular; good appetite.   BOWEL/BLADDER: Continent  SKIN: refuses full skin check.   D/C DATE: Discharge pending guardianship, has GH placement. SW is following.   OTHER IMPORTANT INFO: door alarm active

## 2021-04-13 PROCEDURE — 250N000013 HC RX MED GY IP 250 OP 250 PS 637: Performed by: HOSPITALIST

## 2021-04-13 PROCEDURE — 120N000001 HC R&B MED SURG/OB

## 2021-04-13 PROCEDURE — 99231 SBSQ HOSP IP/OBS SF/LOW 25: CPT | Performed by: INTERNAL MEDICINE

## 2021-04-13 PROCEDURE — 250N000013 HC RX MED GY IP 250 OP 250 PS 637: Performed by: INTERNAL MEDICINE

## 2021-04-13 PROCEDURE — 250N000013 HC RX MED GY IP 250 OP 250 PS 637: Performed by: PSYCHIATRY & NEUROLOGY

## 2021-04-13 RX ADMIN — DOCUSATE SODIUM 100 MG: 100 CAPSULE, LIQUID FILLED ORAL at 21:38

## 2021-04-13 RX ADMIN — OLANZAPINE 10 MG: 5 TABLET, ORALLY DISINTEGRATING ORAL at 17:35

## 2021-04-13 RX ADMIN — BENZTROPINE MESYLATE 1 MG: 0.5 TABLET ORAL at 09:25

## 2021-04-13 RX ADMIN — BENZTROPINE MESYLATE 1 MG: 0.5 TABLET ORAL at 21:39

## 2021-04-13 RX ADMIN — Medication 800 UNITS: at 09:26

## 2021-04-13 RX ADMIN — ATORVASTATIN CALCIUM 80 MG: 40 TABLET, FILM COATED ORAL at 21:39

## 2021-04-13 RX ADMIN — MELATONIN TAB 3 MG 3 MG: 3 TAB at 21:39

## 2021-04-13 RX ADMIN — SALMETEROL XINAFOATE 1 PUFF: 50 POWDER, METERED ORAL; RESPIRATORY (INHALATION) at 09:28

## 2021-04-13 RX ADMIN — LEVOTHYROXINE SODIUM 88 MCG: 88 TABLET ORAL at 09:26

## 2021-04-13 RX ADMIN — SALMETEROL XINAFOATE 1 PUFF: 50 POWDER, METERED ORAL; RESPIRATORY (INHALATION) at 21:45

## 2021-04-13 RX ADMIN — VENLAFAXINE HYDROCHLORIDE 75 MG: 75 CAPSULE, EXTENDED RELEASE ORAL at 09:26

## 2021-04-13 RX ADMIN — HALOPERIDOL 10 MG: 5 TABLET ORAL at 16:36

## 2021-04-13 RX ADMIN — HALOPERIDOL 10 MG: 5 TABLET ORAL at 21:39

## 2021-04-13 RX ADMIN — HALOPERIDOL 10 MG: 5 TABLET ORAL at 09:25

## 2021-04-13 RX ADMIN — MEMANTINE 5 MG: 5 TABLET ORAL at 09:26

## 2021-04-13 RX ADMIN — DOCUSATE SODIUM 100 MG: 100 CAPSULE, LIQUID FILLED ORAL at 09:26

## 2021-04-13 RX ADMIN — ASPIRIN 81 MG: 81 TABLET, COATED ORAL at 09:26

## 2021-04-13 ASSESSMENT — ACTIVITIES OF DAILY LIVING (ADL)
ADLS_ACUITY_SCORE: 11

## 2021-04-13 NOTE — PROGRESS NOTES
Care Management Follow Up    Length of Stay (days): 206    Expected Discharge Date: 04/16/21     Concerns to be Addressed: patient refuses services, discharge planning     Patient plan of care discussed at interdisciplinary rounds: Yes    Anticipated Discharge Disposition: Other (Comments)     Anticipated Discharge Services: None  Anticipated Discharge DME: None    Patient/family educated on Medicare website which has current facility and service quality ratings: (not applicable)  Education Provided on the Discharge Plan:    Patient/Family in Agreement with the Plan: no    Referrals Placed by CM/SW: Post Acute Facilities  Private pay costs discussed: Not applicable    Additional Information:  Placement will occur once guardianship is in established.  Care Transition SW is involved in this process with hospital  Leadership. Writer will update progress note as progress is made.       ASIM CastilloSW

## 2021-04-13 NOTE — PLAN OF CARE
DATE & TIME: 4/12/21 1900-2300  Cognitive Concerns/ Orientation : Alert, oriented to self. Reorientation provided.   BEHAVIOR & AGGRESSION TOOL COLOR: Hx of yellow but green this shift, redirectable.    ABNL VS/O2: Daily VSS on room air  MOBILITY: Independent in room, SBA in halls  PAIN MANAGMENT: Denies  DIET: Regular; good appetite.   BOWEL/BLADDER: Continent  SKIN: intact  D/C DATE: Discharge pending guardianship  OTHER IMPORTANT INFO: door alarm active. Gave PRN zyprexa; pt coming out out room more often

## 2021-04-13 NOTE — PROGRESS NOTES
[facility]    Medicine Progress Note - Hospitalist Service       Date of Admission:  9/9/2020  Assessment & Plan       John Juárez is a 55 year old male with PMHx of schizophrenia, hx of TBI, alcohol use disorder, COPD, hyperlipidemia and hypothyroidism who was admitted on 9/9/2020 for evaluation of aggressive behavior at his group home.       Was evaluated by Psychiatry and his condition stabilized, though his group home was unwilling to take him back and thus hospitalization has been prolonged awaiting new placement and guardianship. Under civil commitment through [facility]     Neurocognitive disorder dt hx of TBI and alcohol use disorder  Schizophrenia  Under commitment  Had been residing in group home prior to admission.  Brought to hospital for evaluation of aggressive behaviors. Group home now unwilling to take him back. Has had numerous CODE 21s called this stay. Seen by psych, meds adjusted. Is currently under civil commitment and court hold through [facility] until 7/31/21.   -- conts on Haldol 10mg TID (timed for when patient tends to be most agitated), memantine 5mg daily, venlafaxine 75mg daily, benztropine 1mg BID   -- prns available for agitation (including Haldol, Zyprexa and Ativan)  -- SW following for placement, placement will occur once guardianship is established     Possible Tardive Dyskinesia vs EPSE  Per psych assessment on 4/1/21, noted to have possible tardive dyskinesia vs extrapyramidal side effects of meds. At that time, recommended to increase Cogentin to 1mg BID and add vitamin E 800 international unit(s) daily.      Back cellulitis in 1/2021: Resolved  Noted on 1/24/21, initiated on cephalexin at that time. Received local wound care and completed 7-day course of antibiotics on 1/30     Baseline Hypotension  BP 90-110s systolic on average. Asymptomatic. No concerns     Hyperlipidemia  Chronic and stable on llow dose ASA and   atorvastatin     COPD  Not O2 dependent. Chronic and stable on salmeterol, albuterol prn     Hypothyroidism  Chronic and stable on levothyroxine     Tobacco use disorder  Using nicotine patch and PRN gum     Resting tremor of upper extremities  No acute issues           Diet: Room Service  Diet  Combination Diet Regular Diet Adult; Safe Tray - with utensils  Snacks/Supplements Adult: Other; Bedtime snack; Between Meals    DVT Prophylaxis: Pneumatic Compression Devices  Valentin Catheter: not present  Code Status: Full Code           Disposition Plan   Expected discharge: when placement found    Entered: Olive Mi MD 04/13/2021, 3:19 PM       The patient's care was discussed with the Bedside Nurse and Patient.    Olive Mi MD  Hospitalist Service  [facility]  Contact information available via [facility] Paging/Directory    ______________________________________________________________________    Interval History   Patient without any new complaints.  Has been calm and cooperative.  Door alarm active.  No other nursing concerns.    Data reviewed today: I reviewed all medications, new labs and imaging results over the last 24 hours.    Physical Exam   Vital Signs: Temp: 97.3  F (36.3  C) Temp src: Oral BP: 109/76 Pulse: 71   Resp: 18 SpO2: 97 % O2 Device: None (Room air)    Weight: 185 lbs 9.6 oz  Exam:  Constitutional: Awake, alert and no distress. Appears comfortable  Head: Normocephalic. No masses, lesions, tenderness or abnormalities  ENT: ENT exam normal, no neck nodes or sinus tenderness  Cardiovascular: RRR.  No  murmurs, no rubs or JVD  Respiratory:normal WOB,b/l equal air entry, no wheezes or crackles   Gastrointestinal: Abdomen soft, non-tender. BS normal. No masses, organomegaly  : Deferred  Extremities :no edema , no clubbing or cyanosis      Data   No lab results found in last 7 days.    No results found for this or any previous visit (from the past 24 hour(s)).  Medications        aspirin  81 mg Oral Daily     atorvastatin  80 mg Oral At Bedtime     benztropine  1 mg Oral BID     docusate sodium  100 mg Oral BID     haloperidol  10 mg Oral TID     levothyroxine  88 mcg Oral Daily     memantine  5 mg Oral Daily     salmeterol  1 puff Inhalation BID     venlafaxine  75 mg Oral Daily with breakfast     vitamin E  800 Units Oral Daily

## 2021-04-13 NOTE — PLAN OF CARE
DATE & TIME: 4/13/21 [id number]  Cognitive Concerns/ Orientation : Alert, oriented to self. Reorientation provided.   BEHAVIOR & AGGRESSION TOOL COLOR: Hx of yellow but green this shift, redirectable.    ABNL VS/O2: Daily VSS on room air  MOBILITY: Independent in room, SBA in halls  PAIN MANAGMENT: Denies  DIET: Regular; good appetite.   BOWEL/BLADDER: Continent  SKIN: intact  D/C DATE: Discharge pending guardianship  OTHER IMPORTANT INFO: door alarm active

## 2021-04-13 NOTE — PLAN OF CARE
DATE & TIME: 4/13/21 1500  Cognitive Concerns/ Orientation : Alert, oriented to self. Reorientation provided.   BEHAVIOR & AGGRESSION TOOL COLOR: Hx of yellow but green this shift, redirectable.    ABNL VS/O2: Daily VSS on room air  MOBILITY: Independent in room, SBA in halls  PAIN MANAGMENT: Denies  DIET: Regular; good appetite.   BOWEL/BLADDER: Continent  SKIN: intact  D/C DATE: Discharge pending guardianship  OTHER IMPORTANT INFO: door alarm active. Pt has been redirectable today.

## 2021-04-14 PROCEDURE — 250N000013 HC RX MED GY IP 250 OP 250 PS 637: Performed by: HOSPITALIST

## 2021-04-14 PROCEDURE — 120N000001 HC R&B MED SURG/OB

## 2021-04-14 PROCEDURE — 99232 SBSQ HOSP IP/OBS MODERATE 35: CPT | Performed by: STUDENT IN AN ORGANIZED HEALTH CARE EDUCATION/TRAINING PROGRAM

## 2021-04-14 PROCEDURE — 250N000013 HC RX MED GY IP 250 OP 250 PS 637: Performed by: INTERNAL MEDICINE

## 2021-04-14 PROCEDURE — 250N000013 HC RX MED GY IP 250 OP 250 PS 637: Performed by: PSYCHIATRY & NEUROLOGY

## 2021-04-14 RX ADMIN — HALOPERIDOL 10 MG: 5 TABLET ORAL at 09:07

## 2021-04-14 RX ADMIN — DOCUSATE SODIUM 100 MG: 100 CAPSULE, LIQUID FILLED ORAL at 20:29

## 2021-04-14 RX ADMIN — ASPIRIN 81 MG: 81 TABLET, COATED ORAL at 09:07

## 2021-04-14 RX ADMIN — SALMETEROL XINAFOATE 1 PUFF: 50 POWDER, METERED ORAL; RESPIRATORY (INHALATION) at 20:35

## 2021-04-14 RX ADMIN — HALOPERIDOL 10 MG: 5 TABLET ORAL at 20:29

## 2021-04-14 RX ADMIN — ATORVASTATIN CALCIUM 80 MG: 40 TABLET, FILM COATED ORAL at 20:29

## 2021-04-14 RX ADMIN — LORAZEPAM 1 MG: 1 TABLET ORAL at 23:42

## 2021-04-14 RX ADMIN — DOCUSATE SODIUM 100 MG: 100 CAPSULE, LIQUID FILLED ORAL at 09:07

## 2021-04-14 RX ADMIN — BENZTROPINE MESYLATE 1 MG: 0.5 TABLET ORAL at 09:07

## 2021-04-14 RX ADMIN — VENLAFAXINE HYDROCHLORIDE 75 MG: 75 CAPSULE, EXTENDED RELEASE ORAL at 09:07

## 2021-04-14 RX ADMIN — HALOPERIDOL 10 MG: 5 TABLET ORAL at 16:13

## 2021-04-14 RX ADMIN — BENZTROPINE MESYLATE 1 MG: 0.5 TABLET ORAL at 20:29

## 2021-04-14 RX ADMIN — MEMANTINE 5 MG: 5 TABLET ORAL at 09:07

## 2021-04-14 RX ADMIN — Medication 800 UNITS: at 09:07

## 2021-04-14 RX ADMIN — LEVOTHYROXINE SODIUM 88 MCG: 88 TABLET ORAL at 09:07

## 2021-04-14 RX ADMIN — MELATONIN TAB 3 MG 3 MG: 3 TAB at 23:42

## 2021-04-14 ASSESSMENT — ACTIVITIES OF DAILY LIVING (ADL)
ADLS_ACUITY_SCORE: 13
ADLS_ACUITY_SCORE: 11
ADLS_ACUITY_SCORE: 13

## 2021-04-14 NOTE — PROGRESS NOTES
[facility]    Medicine Progress Note - Hospitalist Service       Date of Admission:  9/9/2020  Date of Service: 04/14/2021    Assessment & Plan       John Juárez is a 55 year old male with PMHx of schizophrenia, hx of TBI, alcohol use disorder, COPD, hyperlipidemia and hypothyroidism who was admitted on 9/9/2020 for evaluation of aggressive behavior at his group home.       Was evaluated by Psychiatry and his condition stabilized, though his group home was unwilling to take him back and thus hospitalization has been prolonged awaiting new placement and guardianship. Under civil commitment through [facility]     Neurocognitive disorder dt hx of TBI and alcohol use disorder  Schizophrenia  Under commitment  Had been residing in group home prior to admission.  Brought to hospital for evaluation of aggressive behaviors. Group home now unwilling to take him back. Has had numerous CODE 21s called this stay. Seen by psych, meds adjusted. Is currently under civil commitment and court hold through [facility] until 7/31/21.   -- conts on Haldol 10mg TID (timed for when patient tends to be most agitated), memantine 5mg daily, venlafaxine 75mg daily, benztropine 1mg BID   -- prns available for agitation (including Haldol, Zyprexa and Ativan)  -- SW following for placement, placement will occur once guardianship is established     Possible Tardive Dyskinesia vs EPSE  Per psych assessment on 4/1/21, noted to have possible tardive dyskinesia vs extrapyramidal side effects of meds. At that time, recommended to increase Cogentin to 1mg BID and add vitamin E 800 international unit(s) daily.      Back cellulitis in 1/2021: Resolved  Noted on 1/24/21, initiated on cephalexin at that time. Received local wound care and completed 7-day course of antibiotics on 1/30     Baseline Hypotension  BP 90-110s systolic on average. Asymptomatic. No concerns     Hyperlipidemia  Chronic and stable on [facility]  dose ASA and  atorvastatin     COPD  Not O2 dependent. Chronic and stable on salmeterol, albuterol prn     Hypothyroidism  Chronic and stable on levothyroxine     Tobacco use disorder  Using nicotine patch and PRN gum     Resting tremor of upper extremities  No acute issues           Diet: Room Service  Diet  Combination Diet Regular Diet Adult; Safe Tray - with utensils  Snacks/Supplements Adult: Other; Bedtime snack; Between Meals    DVT Prophylaxis: Pneumatic Compression Devices  Valentin Catheter: not present  Code Status: Full Code           Disposition Plan   Expected discharge: when placement found    Entered: Jose Walker MD 04/14/2021, 5:41 PM       The patient's care was discussed with the Bedside Nurse and Patient.    Jose Walker MD  Hospitalist Service  [facility]  Contact information available via [facility] Paging/Directory    ______________________________________________________________________    Interval History      No acute events overnight  Patient without any new complaints.    Mostly has been calm and cooperative.  Door alarm active.    No other nursing concerns.    Data reviewed today: I reviewed all medications, new labs and imaging results over the last 24 hours.    Physical Exam   Vital Signs: Temp: 97.6  F (36.4  C)   BP: 115/85 Pulse: 63   Resp: 18 SpO2: 98 % O2 Device: None (Room air)    Weight: 185 lbs 9.6 oz  Exam:  Constitutional: Awake, alert and no distress. Appears comfortable  Head: Normocephalic. No masses, lesions, tenderness or abnormalities  ENT: ENT exam normal, no neck nodes or sinus tenderness  Cardiovascular: RRR.  No  murmurs, no rubs or JVD  Respiratory:normal WOB,b/l equal air entry, no wheezes or crackles   Gastrointestinal: Abdomen soft, non-tender. BS normal. No masses, organomegaly  : Deferred  Extremities :no edema , no clubbing or cyanosis      Data   No lab results found in last 7 days.    No results found for this or any previous visit (from  the past 24 hour(s)).  Medications       aspirin  81 mg Oral Daily     atorvastatin  80 mg Oral At Bedtime     benztropine  1 mg Oral BID     docusate sodium  100 mg Oral BID     haloperidol  10 mg Oral TID     levothyroxine  88 mcg Oral Daily     memantine  5 mg Oral Daily     salmeterol  1 puff Inhalation BID     venlafaxine  75 mg Oral Daily with breakfast     vitamin E  800 Units Oral Daily

## 2021-04-14 NOTE — PLAN OF CARE
Cognitive Concerns/ Orientation : Alert, oriented to self. Reorientation provided. Restless at times  BEHAVIOR & AGGRESSION TOOL COLOR: Hx of yellow but green this shift, redirectable.    ABNL VS/O2: VSS on room air  MOBILITY: Independent in room, SBA in halls  PAIN MANAGMENT: Denies  DIET: Regular; good appetite.   BOWEL/BLADDER: Continent  SKIN: Intact  D/C DATE: Discharge pending guardianship  OTHER IMPORTANT INFO: Door alarm active. Pt has been redirectable this shift.

## 2021-04-14 NOTE — PLAN OF CARE
Cognitive Concerns/ Orientation : Alert, oriented to self. Reorientation provided.   BEHAVIOR & AGGRESSION TOOL COLOR: Hx of yellow but green this shift, redirectable.    ABNL VS/O2: Daily VSS on room air  MOBILITY: Independent in room, SBA in halls  PAIN MANAGMENT: Denies  DIET: Regular; good appetite.   BOWEL/BLADDER: Continent  SKIN: Intact  D/C DATE: Discharge pending guardianship  OTHER IMPORTANT INFO: Door alarm active. Pt has been redirectable this shift.

## 2021-04-15 PROCEDURE — 250N000013 HC RX MED GY IP 250 OP 250 PS 637: Performed by: PSYCHIATRY & NEUROLOGY

## 2021-04-15 PROCEDURE — 99231 SBSQ HOSP IP/OBS SF/LOW 25: CPT | Performed by: STUDENT IN AN ORGANIZED HEALTH CARE EDUCATION/TRAINING PROGRAM

## 2021-04-15 PROCEDURE — 250N000013 HC RX MED GY IP 250 OP 250 PS 637: Performed by: INTERNAL MEDICINE

## 2021-04-15 PROCEDURE — 250N000013 HC RX MED GY IP 250 OP 250 PS 637: Performed by: HOSPITALIST

## 2021-04-15 PROCEDURE — 120N000001 HC R&B MED SURG/OB

## 2021-04-15 RX ADMIN — DOCUSATE SODIUM 100 MG: 100 CAPSULE, LIQUID FILLED ORAL at 09:38

## 2021-04-15 RX ADMIN — HALOPERIDOL 10 MG: 5 TABLET ORAL at 21:01

## 2021-04-15 RX ADMIN — LEVOTHYROXINE SODIUM 88 MCG: 88 TABLET ORAL at 09:38

## 2021-04-15 RX ADMIN — MEMANTINE 5 MG: 5 TABLET ORAL at 09:39

## 2021-04-15 RX ADMIN — Medication 800 UNITS: at 09:38

## 2021-04-15 RX ADMIN — SALMETEROL XINAFOATE 1 PUFF: 50 POWDER, METERED ORAL; RESPIRATORY (INHALATION) at 09:41

## 2021-04-15 RX ADMIN — SALMETEROL XINAFOATE 1 PUFF: 50 POWDER, METERED ORAL; RESPIRATORY (INHALATION) at 21:04

## 2021-04-15 RX ADMIN — DOCUSATE SODIUM 100 MG: 100 CAPSULE, LIQUID FILLED ORAL at 21:02

## 2021-04-15 RX ADMIN — ATORVASTATIN CALCIUM 80 MG: 40 TABLET, FILM COATED ORAL at 21:01

## 2021-04-15 RX ADMIN — ASPIRIN 81 MG: 81 TABLET, COATED ORAL at 09:39

## 2021-04-15 RX ADMIN — LORAZEPAM 1 MG: 1 TABLET ORAL at 15:38

## 2021-04-15 RX ADMIN — HALOPERIDOL 10 MG: 5 TABLET ORAL at 09:39

## 2021-04-15 RX ADMIN — BENZTROPINE MESYLATE 1 MG: 0.5 TABLET ORAL at 21:02

## 2021-04-15 RX ADMIN — BENZTROPINE MESYLATE 1 MG: 0.5 TABLET ORAL at 09:38

## 2021-04-15 RX ADMIN — HALOPERIDOL 10 MG: 5 TABLET ORAL at 14:19

## 2021-04-15 RX ADMIN — VENLAFAXINE HYDROCHLORIDE 75 MG: 75 CAPSULE, EXTENDED RELEASE ORAL at 09:39

## 2021-04-15 ASSESSMENT — ACTIVITIES OF DAILY LIVING (ADL)
ADLS_ACUITY_SCORE: 11

## 2021-04-15 NOTE — PROGRESS NOTES
SPIRITUAL HEALTH SERVICES  SPIRITUAL ASSESSMENT Progress Note  [facility]     REFERRAL SOURCE: Length of Stay follow up    Brief conversation with John to introduce  services. He declined a visit and need for support at this time.    PLAN:  remains available for support as requested.    Monserrat Montoya  Associate   Pager [phone]  Phone [phone]

## 2021-04-15 NOTE — PROGRESS NOTES
[facility]    Medicine Progress Note - Hospitalist Service       Date of Admission:  9/9/2020  Date of Service: 04/15/2021    Assessment & Plan          John Juárez is a 55 year old male with PMHx of schizophrenia, hx of TBI, alcohol use disorder, COPD, hyperlipidemia and hypothyroidism who was admitted on 9/9/2020 for evaluation of aggressive behavior at his group home.       Was evaluated by Psychiatry and his condition stabilized, though his group home was unwilling to take him back and thus hospitalization has been prolonged awaiting new placement and guardianship. Under civil commitment through [facility]     Neurocognitive disorder dt hx of TBI and alcohol use disorder  Schizophrenia  Under commitment  Had been residing in group home prior to admission.  Brought to hospital for evaluation of aggressive behaviors. Group home now unwilling to take him back. Has had numerous CODE 21s called this stay. Seen by psych, meds adjusted. Is currently under civil commitment and court hold through [facility] until 7/31/21.   -- conts on Haldol 10mg TID (timed for when patient tends to be most agitated), memantine 5mg daily, venlafaxine 75mg daily, benztropine 1mg BID   -- prns available for agitation (including Haldol, Zyprexa and Ativan)  -- SW following for placement, placement will occur once guardianship is established     Possible Tardive Dyskinesia vs EPSE  Per psych assessment on 4/1/21, noted to have possible tardive dyskinesia vs extrapyramidal side effects of meds. At that time, recommended to increase Cogentin to 1mg BID and add vitamin E 800 international unit(s) daily.      Back cellulitis in 1/2021: Resolved  Noted on 1/24/21, initiated on cephalexin at that time. Received local wound care and completed 7-day course of antibiotics on 1/30     Baseline Hypotension  BP 90-110s systolic on average. Asymptomatic. No concerns     Hyperlipidemia  Chronic and stable on [facility]  dose ASA and  atorvastatin     COPD  Not O2 dependent. Chronic and stable on salmeterol, albuterol prn     Hypothyroidism  Chronic and stable on levothyroxine     Tobacco use disorder  Using nicotine patch and PRN gum     Resting tremor of upper extremities  No acute issues           Diet: Room Service  Diet  Combination Diet Regular Diet Adult; Safe Tray - with utensils  Snacks/Supplements Adult: Other; Bedtime snack; Between Meals    DVT Prophylaxis: Pneumatic Compression Devices  Valentin Catheter: not present  Code Status: Full Code           Disposition Plan   Expected discharge: when placement found    Entered: Jose Walker MD 04/15/2021, 3:20 PM       The patient's care was discussed with the Bedside Nurse and Patient.    Jose Walker MD  Hospitalist Service  [facility]  Contact information available via [facility] Paging/Directory    ______________________________________________________________________    Interval History      No acute events overnight  Patient has no new complaints.    Slept most of the day and has been calm and cooperative.  Door alarm active.    No other nursing concerns.    Data reviewed today: I reviewed all medications, new labs and imaging results over the last 24 hours.    Physical Exam   Vital Signs:           Resp: 16        Weight: 185 lbs 9.6 oz    Exam:  Constitutional:no apparent distress  Cardiovascular: RRR.  No  murmurs, no rubs or JVD  Respiratory:normal WOB,b/l equal air entry, no wheezes or crackles   Gastrointestinal: Abdomen soft, non-tender. BS normal. No masses, organomegaly  Extremities :no edema , no clubbing or cyanosis      Data   No lab results found in last 7 days.    No results found for this or any previous visit (from the past 24 hour(s)).  Medications       aspirin  81 mg Oral Daily     atorvastatin  80 mg Oral At Bedtime     benztropine  1 mg Oral BID     docusate sodium  100 mg Oral BID     haloperidol  10 mg Oral TID     levothyroxine  88  mcg Oral Daily     memantine  5 mg Oral Daily     salmeterol  1 puff Inhalation BID     venlafaxine  75 mg Oral Daily with breakfast     vitamin E  800 Units Oral Daily

## 2021-04-15 NOTE — PLAN OF CARE
Cognitive Concerns/ Orientation : Alert & oriented to self.  He was anxious, agitated and very restless at bedtime; he wanted to leave, he stated that there was a ride for him down stairs to take him home. Attempts to redirect not effective, he continued coming out of his room carrying some of his belongings; PRN Ativan given and was effective  BEHAVIOR & AGGRESSION TOOL COLOR: Yellow   ABNL VS/O2: VSS on room air  MOBILITY: Independent in room, SBA in halls  PAIN MANAGMENT: Denies  DIET: Regular; good appetite.   BOWEL/BLADDER: Continent  SKIN: Intact  D/C DATE: Discharge pending guardianship  OTHER IMPORTANT INFO: Door alarm active.

## 2021-04-15 NOTE — PLAN OF CARE
Cognitive Concerns/ Orientation: Alert & oriented to self.  Stayed in room most of shift. Quiet  BEHAVIOR & AGGRESSION TOOL COLOR: Yellow   ABNL VS/O2: VSS on room air  MOBILITY: Independent in room, SBA in halls  PAIN MANAGMENT: Denies  DIET: Regular; good appetite. Needs assistance with ordering meals   BOWEL/BLADDER: Continent  SKIN: Intact. Dry/flakey  D/C DATE: Discharge pending guardianship  OTHER IMPORTANT INFO: Door alarm active. Offered walking in the halls

## 2021-04-15 NOTE — PROGRESS NOTES
RN [id number]: Patient alert/orient X1, has door alarm on. Patient is redirectable. Lungs clear on RA. Tolerating diet/good appetite. Denies pain.

## 2021-04-16 PROCEDURE — 120N000001 HC R&B MED SURG/OB

## 2021-04-16 PROCEDURE — 250N000013 HC RX MED GY IP 250 OP 250 PS 637: Performed by: INTERNAL MEDICINE

## 2021-04-16 PROCEDURE — 250N000013 HC RX MED GY IP 250 OP 250 PS 637: Performed by: HOSPITALIST

## 2021-04-16 PROCEDURE — 99231 SBSQ HOSP IP/OBS SF/LOW 25: CPT | Performed by: STUDENT IN AN ORGANIZED HEALTH CARE EDUCATION/TRAINING PROGRAM

## 2021-04-16 PROCEDURE — 250N000013 HC RX MED GY IP 250 OP 250 PS 637: Performed by: PSYCHIATRY & NEUROLOGY

## 2021-04-16 RX ADMIN — BENZTROPINE MESYLATE 1 MG: 0.5 TABLET ORAL at 09:38

## 2021-04-16 RX ADMIN — ATORVASTATIN CALCIUM 80 MG: 40 TABLET, FILM COATED ORAL at 21:24

## 2021-04-16 RX ADMIN — LEVOTHYROXINE SODIUM 88 MCG: 88 TABLET ORAL at 09:39

## 2021-04-16 RX ADMIN — DOCUSATE SODIUM 100 MG: 100 CAPSULE, LIQUID FILLED ORAL at 09:38

## 2021-04-16 RX ADMIN — Medication 800 UNITS: at 09:38

## 2021-04-16 RX ADMIN — SALMETEROL XINAFOATE 1 PUFF: 50 POWDER, METERED ORAL; RESPIRATORY (INHALATION) at 09:41

## 2021-04-16 RX ADMIN — VENLAFAXINE HYDROCHLORIDE 75 MG: 75 CAPSULE, EXTENDED RELEASE ORAL at 09:39

## 2021-04-16 RX ADMIN — ASPIRIN 81 MG: 81 TABLET, COATED ORAL at 09:38

## 2021-04-16 RX ADMIN — MEMANTINE 5 MG: 5 TABLET ORAL at 09:39

## 2021-04-16 RX ADMIN — HALOPERIDOL 10 MG: 5 TABLET ORAL at 09:39

## 2021-04-16 RX ADMIN — HALOPERIDOL 10 MG: 5 TABLET ORAL at 21:24

## 2021-04-16 RX ADMIN — BENZTROPINE MESYLATE 1 MG: 0.5 TABLET ORAL at 21:24

## 2021-04-16 RX ADMIN — HALOPERIDOL 10 MG: 5 TABLET ORAL at 14:41

## 2021-04-16 ASSESSMENT — ACTIVITIES OF DAILY LIVING (ADL)
ADLS_ACUITY_SCORE: 11

## 2021-04-16 NOTE — PLAN OF CARE
DATE & TIME: 4/15 [id number]  Cognitive Concerns/ Orientation: Alert & oriented to self and place. Hallucinations.  BEHAVIOR & AGGRESSION TOOL COLOR: Green (yellow at times)  ABNL VS/O2: VSS on RA  MOBILITY: Independent in room, SBA in halls. Multiple walks this shift.  PAIN MANAGMENT: Denies  DIET: Regular; good appetite. Needs assistance with ordering meals. Try to cut down on soda intake.  BOWEL/BLADDER: Continent  SKIN: Intact. Dry/flakey  D/C DATE: Discharge pending guardianship  OTHER IMPORTANT INFO: PRN Ativan x1. Scheduled Haldol. Door alarm active. Offered walking in the halls. Showered this shift. Hair braided.

## 2021-04-16 NOTE — PLAN OF CARE
Cognitive Concerns/ Orientation: A&Ox1. Calm and cooperative. States his sister is coming to pick him up tonight-reminded patient he is not allowed to leave the hospital. Slept most of shift-woke him up several times to engage in conversation and get him to move about. He states he sleeps because there's nothing else to do  BEHAVIOR & AGGRESSION TOOL COLOR: Green (yellow at times)  ABNL VS/O2: VSS on RA  MOBILITY: Independent in room, SBA in halls. Ambulated in the halls several times  PAIN MANAGMENT: Denies  DIET: Regular; good appetite. Needs assistance with ordering meals  BOWEL/BLADDER: Continent  SKIN: Intact.   D/C DATE: Discharge pending guardianship  OTHER IMPORTANT INFO: Door alarm active. Foot cares performed-applied lotion, trimmed fingernails.

## 2021-04-16 NOTE — PLAN OF CARE
Cognitive Concerns/ Orientation: Alert & oriented to self and place.  BEHAVIOR & AGGRESSION TOOL COLOR: Green (yellow at times)  ABNL VS/O2: VSS on RA  MOBILITY: Independent in room, SBA in halls. Multiple walks this shift.  PAIN MANAGMENT: Denies  DIET: Regular; good appetite.  BOWEL/BLADDER: Continent  SKIN: Intact.   D/C DATE: Discharge pending guardianship  OTHER IMPORTANT INFO: Door alarm active.

## 2021-04-16 NOTE — PROGRESS NOTES
[facility]    Medicine Progress Note - Hospitalist Service       Date of Admission:  9/9/2020  Date of Service: 04/16/2021    Assessment & Plan          John Juárez is a 55 year old male with PMHx of schizophrenia, hx of TBI, alcohol use disorder, COPD, hyperlipidemia and hypothyroidism who was admitted on 9/9/2020 for evaluation of aggressive behavior at his group home.       Was evaluated by Psychiatry and his condition stabilized, though his group home was unwilling to take him back and thus hospitalization has been prolonged awaiting new placement and guardianship. Under civil commitment through [facility]     Neurocognitive disorder dt hx of TBI and alcohol use disorder  Schizophrenia  Under commitment  Had been residing in group home prior to admission.  Brought to hospital for evaluation of aggressive behaviors. Group home now unwilling to take him back. Has had numerous CODE 21s called this stay. Seen by psych, meds adjusted. Is currently under civil commitment and court hold through [facility] until 7/31/21.   -- conts on Haldol 10mg TID (timed for when patient tends to be most agitated), memantine 5mg daily, venlafaxine 75mg daily, benztropine 1mg BID   -- prns available for agitation (including Haldol, Zyprexa and Ativan)  -- SW following for placement, placement will occur once guardianship is established     Possible Tardive Dyskinesia vs EPSE  Per psych assessment on 4/1/21, noted to have possible tardive dyskinesia vs extrapyramidal side effects of meds. At that time, recommended to increase Cogentin to 1mg BID and add vitamin E 800 international unit(s) daily.      Back cellulitis in 1/2021: Resolved  Noted on 1/24/21, initiated on cephalexin at that time. Received local wound care and completed 7-day course of antibiotics on 1/30     Baseline Hypotension  BP 90-110s systolic on average. Asymptomatic. No concerns     Hyperlipidemia  Chronic and stable on [facility]  dose ASA and  atorvastatin     COPD  Not O2 dependent. Chronic and stable on salmeterol, albuterol prn     Hypothyroidism  Chronic and stable on levothyroxine     Tobacco use disorder  Using nicotine patch and PRN gum     Resting tremor of upper extremities  No acute issues           Diet: Room Service  Diet  Combination Diet Regular Diet Adult; Safe Tray - with utensils  Snacks/Supplements Adult: Other; Bedtime snack; Between Meals  Room Service    DVT Prophylaxis: Pneumatic Compression Devices  Valentin Catheter: not present  Code Status: Full Code           Disposition Plan   Expected discharge: when placement found    Entered: Jose Walker MD 04/16/2021, 4:06 PM       The patient's care was discussed with the Bedside Nurse and Patient.    Jose Walker MD  Hospitalist Service  [facility]  Contact information available via [facility] Paging/Directory    ______________________________________________________________________    Interval History      No acute events overnight  Patient has no new complaints.    Slept most of the day and has been calm and cooperative.  Door alarm active.    No other nursing concerns.    Data reviewed today: I reviewed all medications, new labs and imaging results over the last 24 hours.    Physical Exam   Vital Signs:           Resp: 18        Weight: 185 lbs 9.6 oz    Exam:  Constitutional:no apparent distress  Cardiovascular: RRR.  No  murmurs, no rubs or JVD  Respiratory:normal WOB,b/l equal air entry, no wheezes or crackles   Gastrointestinal: Abdomen soft, non-tender. BS normal. No masses, organomegaly  Extremities :no edema , no clubbing or cyanosis      Data   No lab results found in last 7 days.    No results found for this or any previous visit (from the past 24 hour(s)).  Medications       aspirin  81 mg Oral Daily     atorvastatin  80 mg Oral At Bedtime     benztropine  1 mg Oral BID     docusate sodium  100 mg Oral BID     haloperidol  10 mg Oral TID      levothyroxine  88 mcg Oral Daily     memantine  5 mg Oral Daily     salmeterol  1 puff Inhalation BID     venlafaxine  75 mg Oral Daily with breakfast     vitamin E  800 Units Oral Daily

## 2021-04-17 PROCEDURE — 250N000013 HC RX MED GY IP 250 OP 250 PS 637: Performed by: INTERNAL MEDICINE

## 2021-04-17 PROCEDURE — 250N000013 HC RX MED GY IP 250 OP 250 PS 637: Performed by: HOSPITALIST

## 2021-04-17 PROCEDURE — 250N000013 HC RX MED GY IP 250 OP 250 PS 637: Performed by: PSYCHIATRY & NEUROLOGY

## 2021-04-17 PROCEDURE — 120N000001 HC R&B MED SURG/OB

## 2021-04-17 PROCEDURE — 99231 SBSQ HOSP IP/OBS SF/LOW 25: CPT | Performed by: STUDENT IN AN ORGANIZED HEALTH CARE EDUCATION/TRAINING PROGRAM

## 2021-04-17 RX ADMIN — BENZTROPINE MESYLATE 1 MG: 0.5 TABLET ORAL at 08:36

## 2021-04-17 RX ADMIN — OLANZAPINE 10 MG: 5 TABLET, ORALLY DISINTEGRATING ORAL at 18:28

## 2021-04-17 RX ADMIN — HALOPERIDOL 10 MG: 5 TABLET ORAL at 20:46

## 2021-04-17 RX ADMIN — DOCUSATE SODIUM 100 MG: 100 CAPSULE, LIQUID FILLED ORAL at 08:36

## 2021-04-17 RX ADMIN — VENLAFAXINE HYDROCHLORIDE 75 MG: 75 CAPSULE, EXTENDED RELEASE ORAL at 08:36

## 2021-04-17 RX ADMIN — HALOPERIDOL 10 MG: 5 TABLET ORAL at 15:42

## 2021-04-17 RX ADMIN — ASPIRIN 81 MG: 81 TABLET, COATED ORAL at 08:36

## 2021-04-17 RX ADMIN — LEVOTHYROXINE SODIUM 88 MCG: 88 TABLET ORAL at 08:36

## 2021-04-17 RX ADMIN — HALOPERIDOL 10 MG: 5 TABLET ORAL at 08:36

## 2021-04-17 RX ADMIN — BENZTROPINE MESYLATE 1 MG: 0.5 TABLET ORAL at 20:46

## 2021-04-17 RX ADMIN — ATORVASTATIN CALCIUM 80 MG: 40 TABLET, FILM COATED ORAL at 20:46

## 2021-04-17 RX ADMIN — OLANZAPINE 10 MG: 5 TABLET, ORALLY DISINTEGRATING ORAL at 00:24

## 2021-04-17 RX ADMIN — Medication 800 UNITS: at 08:36

## 2021-04-17 RX ADMIN — MEMANTINE 5 MG: 5 TABLET ORAL at 08:36

## 2021-04-17 RX ADMIN — SALMETEROL XINAFOATE 1 PUFF: 50 POWDER, METERED ORAL; RESPIRATORY (INHALATION) at 08:40

## 2021-04-17 ASSESSMENT — ACTIVITIES OF DAILY LIVING (ADL)
ADLS_ACUITY_SCORE: 11

## 2021-04-17 NOTE — PLAN OF CARE
DATE & TIME: 4/16 3-11p  Cognitive Concerns/ Orientation: A&Ox1, forgetful, restless, anxious  BEHAVIOR & AGGRESSION TOOL COLOR: Green (yellow at times)  ABNL VS/O2: VSS on RA  MOBILITY: Independent in room, SBA in halls. Ambulated in the halls several times  PAIN MANAGMENT: Denies  DIET: Regular; good appetite. Needs assistance with ordering meals  BOWEL/BLADDER: Continent  SKIN: Intact.   D/C DATE: Discharge pending guardianship, SW following  OTHER IMPORTANT INFO: Haldol given as scheduled, door alarm active.

## 2021-04-17 NOTE — PLAN OF CARE
DATE & TIME: 4/17/2021 [id number]  Cognitive Concerns/ Orientation: A&Ox1, forgetful, restless, anxious at times--wants to leave, stated that his commitment should be up by now. Writer able to redirect pt back to room. Pt slammed door few times during shift.   BEHAVIOR & AGGRESSION TOOL COLOR: Green (yellow at times)  ABNL VS/O2: VSS on RA  MOBILITY: Independent in room, SBA in halls.   PAIN MANAGMENT: Denies  DIET: Regular; good appetite. Needs assistance with ordering meals  BOWEL/BLADDER: Continent  SKIN: Intact.   D/C DATE: Discharge pending guardianship, SW following  OTHER IMPORTANT INFO: door alarm active due to elopement risk.

## 2021-04-17 NOTE — PLAN OF CARE
DATE & TIME: 4/16/21 [id number]  Cognitive Concerns/ Orientation: A&Ox1, forgetful, restless, anxious  BEHAVIOR & AGGRESSION TOOL COLOR: Green (yellow at times)  ABNL VS/O2: VSS on RA  MOBILITY: Independent in room, SBA in halls. Ambulated in the halls several times  PAIN MANAGMENT: Denies  DIET: Regular; good appetite. Needs assistance with ordering meals  BOWEL/BLADDER: Continent  SKIN: Intact.   D/C DATE: Discharge pending guardianship, SW following  OTHER IMPORTANT INFO: Haldol given as scheduled, door alarm active. Zypraxa given this shift

## 2021-04-17 NOTE — PROGRESS NOTES
[facility]    Medicine Progress Note - Hospitalist Service       Date of Admission:  9/9/2020  Date of Service: 04/17/2021    Assessment & Plan          John Juárez is a 55 year old male with PMHx of schizophrenia, hx of TBI, alcohol use disorder, COPD, hyperlipidemia and hypothyroidism who was admitted on 9/9/2020 for evaluation of aggressive behavior at his group home.       Was evaluated by Psychiatry and his condition stabilized, though his group home was unwilling to take him back and thus hospitalization has been prolonged awaiting new placement and guardianship. Under civil commitment through [facility]     Neurocognitive disorder dt hx of TBI and alcohol use disorder  Schizophrenia  Under commitment  Had been residing in group home prior to admission.  Brought to hospital for evaluation of aggressive behaviors. Group home now unwilling to take him back. Has had numerous CODE 21s called this stay. Seen by psych, meds adjusted. Is currently under civil commitment and court hold through [facility] until 7/31/21.   -- conts on Haldol 10mg TID (timed for when patient tends to be most agitated), memantine 5mg daily, venlafaxine 75mg daily, benztropine 1mg BID   -- prns available for agitation (including Haldol, Zyprexa and Ativan)  -- SW following for placement, placement will occur once guardianship is established     Possible Tardive Dyskinesia vs EPSE  Per psych assessment on 4/1/21, noted to have possible tardive dyskinesia vs extrapyramidal side effects of meds. At that time, recommended to increase Cogentin to 1mg BID and add vitamin E 800 international unit(s) daily.      Back cellulitis in 1/2021: Resolved  Noted on 1/24/21, initiated on cephalexin at that time. Received local wound care and completed 7-day course of antibiotics on 1/30     Baseline Hypotension  BP 90-110s systolic on average. Asymptomatic. No concerns     Hyperlipidemia  Chronic and stable on [facility]  dose ASA and  atorvastatin     COPD  Not O2 dependent. Chronic and stable on salmeterol, albuterol prn     Hypothyroidism  Chronic and stable on levothyroxine     Tobacco use disorder  Using nicotine patch and PRN gum     Resting tremor of upper extremities  No acute issues           Diet: Room Service  Diet  Combination Diet Regular Diet Adult; Safe Tray - with utensils  Snacks/Supplements Adult: Other; Bedtime snack; Between Meals  Room Service    DVT Prophylaxis: Pneumatic Compression Devices  Valentin Catheter: not present  Code Status: Full Code           Disposition Plan   Expected discharge: when placement found    Entered: Jose Walker MD 04/17/2021, 3:41 PM       The patient's care was discussed with the Bedside Nurse and Patient.    Jose Walker MD  Hospitalist Service  [facility]  Contact information available via [facility] Paging/Directory    ______________________________________________________________________    Interval History      No acute events overnight  No complaints today,  Door alarm active.    No other nursing concerns.    Data reviewed today: I reviewed all medications, new labs and imaging results over the last 24 hours.    Physical Exam   Vital Signs: Temp: 97.3  F (36.3  C) Temp src: Oral BP: 120/89 Pulse: 70   Resp: 16 SpO2: 96 % O2 Device: None (Room air)    Weight: 185 lbs 9.6 oz    Exam:  Constitutional:no apparent distress  Cardiovascular: RRR.  No  murmurs, no rubs or JVD  Respiratory:normal WOB,b/l equal air entry, no wheezes or crackles   Gastrointestinal: Abdomen soft, non-tender. BS normal. No masses, organomegaly  Extremities :no edema , no clubbing or cyanosis      Data   No lab results found in last 7 days.    No results found for this or any previous visit (from the past 24 hour(s)).  Medications       aspirin  81 mg Oral Daily     atorvastatin  80 mg Oral At Bedtime     benztropine  1 mg Oral BID     docusate sodium  100 mg Oral BID     haloperidol  10 mg  Oral TID     levothyroxine  88 mcg Oral Daily     memantine  5 mg Oral Daily     salmeterol  1 puff Inhalation BID     venlafaxine  75 mg Oral Daily with breakfast     vitamin E  800 Units Oral Daily

## 2021-04-18 PROCEDURE — 120N000001 HC R&B MED SURG/OB

## 2021-04-18 PROCEDURE — 250N000013 HC RX MED GY IP 250 OP 250 PS 637: Performed by: INTERNAL MEDICINE

## 2021-04-18 PROCEDURE — 250N000013 HC RX MED GY IP 250 OP 250 PS 637: Performed by: HOSPITALIST

## 2021-04-18 PROCEDURE — 250N000013 HC RX MED GY IP 250 OP 250 PS 637: Performed by: PSYCHIATRY & NEUROLOGY

## 2021-04-18 PROCEDURE — 99231 SBSQ HOSP IP/OBS SF/LOW 25: CPT | Performed by: STUDENT IN AN ORGANIZED HEALTH CARE EDUCATION/TRAINING PROGRAM

## 2021-04-18 RX ADMIN — DOCUSATE SODIUM 100 MG: 100 CAPSULE, LIQUID FILLED ORAL at 07:53

## 2021-04-18 RX ADMIN — ASPIRIN 81 MG: 81 TABLET, COATED ORAL at 07:53

## 2021-04-18 RX ADMIN — Medication 800 UNITS: at 07:52

## 2021-04-18 RX ADMIN — BENZTROPINE MESYLATE 1 MG: 0.5 TABLET ORAL at 07:53

## 2021-04-18 RX ADMIN — ACETAMINOPHEN 650 MG: 325 TABLET, FILM COATED ORAL at 08:03

## 2021-04-18 RX ADMIN — VENLAFAXINE HYDROCHLORIDE 75 MG: 75 CAPSULE, EXTENDED RELEASE ORAL at 07:53

## 2021-04-18 RX ADMIN — SALMETEROL XINAFOATE 1 PUFF: 50 POWDER, METERED ORAL; RESPIRATORY (INHALATION) at 07:52

## 2021-04-18 RX ADMIN — HALOPERIDOL 10 MG: 5 TABLET ORAL at 21:40

## 2021-04-18 RX ADMIN — LEVOTHYROXINE SODIUM 88 MCG: 88 TABLET ORAL at 07:53

## 2021-04-18 RX ADMIN — HALOPERIDOL 10 MG: 5 TABLET ORAL at 14:01

## 2021-04-18 RX ADMIN — ATORVASTATIN CALCIUM 80 MG: 40 TABLET, FILM COATED ORAL at 21:40

## 2021-04-18 RX ADMIN — HALOPERIDOL 10 MG: 5 TABLET ORAL at 07:53

## 2021-04-18 RX ADMIN — MEMANTINE 5 MG: 5 TABLET ORAL at 07:53

## 2021-04-18 RX ADMIN — OLANZAPINE 10 MG: 5 TABLET, ORALLY DISINTEGRATING ORAL at 15:21

## 2021-04-18 RX ADMIN — LORAZEPAM 1 MG: 1 TABLET ORAL at 11:26

## 2021-04-18 RX ADMIN — BENZTROPINE MESYLATE 1 MG: 0.5 TABLET ORAL at 21:40

## 2021-04-18 ASSESSMENT — ACTIVITIES OF DAILY LIVING (ADL)
ADLS_ACUITY_SCORE: 11

## 2021-04-18 NOTE — PLAN OF CARE
DATE & TIME: 4/17/2021 8350-6007  Cognitive Concerns/ Orientation: A&Ox1, forgetful, restless, anxious at times--wants to leave, ambulated in halls x3  BEHAVIOR & AGGRESSION TOOL COLOR: Green (yellow at times)  ABNL VS/O2: VSS on RA  MOBILITY: Independent in room, SBA in halls.   PAIN MANAGMENT: Denies  DIET: Regular; good appetite. Needs assistance with ordering meals  BOWEL/BLADDER: Continent  SKIN: Intact.   D/C DATE: Discharge pending guardianship, SW following  OTHER IMPORTANT INFO: door alarm active due to elopement risk. Pt took shower, Haldol given as scheduled.

## 2021-04-18 NOTE — PLAN OF CARE
DATE & TIME: 4/18/2021 4460-1255  Cognitive Concerns/ Orientation: A&Ox1, forgetful, restless, anxious at times--wants to leave, thought someone is coming with his prize money (over 1 trillion dollars). Ambulated in halls. Slamming doors at times. PRN Ativan oral given once.   BEHAVIOR & AGGRESSION TOOL COLOR: Green (yellow at times)  ABNL VS/O2: VSS on RA  MOBILITY: Independent in room, SBA in halls.   PAIN MANAGMENT: Denies  DIET: Regular; good appetite. Needs assistance with ordering meals  BOWEL/BLADDER: Continent  SKIN: Intact.   D/C DATE: Discharge pending guardianship, SW following  OTHER IMPORTANT INFO: door alarm active due to elopement risk.

## 2021-04-19 PROCEDURE — 250N000013 HC RX MED GY IP 250 OP 250 PS 637: Performed by: HOSPITALIST

## 2021-04-19 PROCEDURE — 250N000013 HC RX MED GY IP 250 OP 250 PS 637: Performed by: PSYCHIATRY & NEUROLOGY

## 2021-04-19 PROCEDURE — 250N000013 HC RX MED GY IP 250 OP 250 PS 637: Performed by: NURSE PRACTITIONER

## 2021-04-19 PROCEDURE — 99231 SBSQ HOSP IP/OBS SF/LOW 25: CPT | Performed by: STUDENT IN AN ORGANIZED HEALTH CARE EDUCATION/TRAINING PROGRAM

## 2021-04-19 PROCEDURE — 250N000013 HC RX MED GY IP 250 OP 250 PS 637: Performed by: INTERNAL MEDICINE

## 2021-04-19 PROCEDURE — 120N000001 HC R&B MED SURG/OB

## 2021-04-19 RX ADMIN — SALMETEROL XINAFOATE 1 PUFF: 50 POWDER, METERED ORAL; RESPIRATORY (INHALATION) at 23:37

## 2021-04-19 RX ADMIN — HALOPERIDOL 10 MG: 5 TABLET ORAL at 08:17

## 2021-04-19 RX ADMIN — MEMANTINE 5 MG: 5 TABLET ORAL at 08:18

## 2021-04-19 RX ADMIN — HALOPERIDOL 10 MG: 5 TABLET ORAL at 15:50

## 2021-04-19 RX ADMIN — OLANZAPINE 10 MG: 5 TABLET, ORALLY DISINTEGRATING ORAL at 18:13

## 2021-04-19 RX ADMIN — DOCUSATE SODIUM 100 MG: 100 CAPSULE, LIQUID FILLED ORAL at 08:18

## 2021-04-19 RX ADMIN — DOCUSATE SODIUM 100 MG: 100 CAPSULE, LIQUID FILLED ORAL at 23:36

## 2021-04-19 RX ADMIN — LEVOTHYROXINE SODIUM 88 MCG: 88 TABLET ORAL at 08:18

## 2021-04-19 RX ADMIN — VENLAFAXINE HYDROCHLORIDE 75 MG: 75 CAPSULE, EXTENDED RELEASE ORAL at 08:17

## 2021-04-19 RX ADMIN — BENZTROPINE MESYLATE 1 MG: 0.5 TABLET ORAL at 08:18

## 2021-04-19 RX ADMIN — HALOPERIDOL 10 MG: 5 TABLET ORAL at 23:36

## 2021-04-19 RX ADMIN — NICOTINE POLACRILEX 2 MG: 2 GUM, CHEWING ORAL at 17:23

## 2021-04-19 RX ADMIN — Medication 800 UNITS: at 08:18

## 2021-04-19 RX ADMIN — SALMETEROL XINAFOATE 1 PUFF: 50 POWDER, METERED ORAL; RESPIRATORY (INHALATION) at 08:23

## 2021-04-19 RX ADMIN — ATORVASTATIN CALCIUM 80 MG: 40 TABLET, FILM COATED ORAL at 23:36

## 2021-04-19 RX ADMIN — ASPIRIN 81 MG: 81 TABLET, COATED ORAL at 08:18

## 2021-04-19 RX ADMIN — LORAZEPAM 1 MG: 1 TABLET ORAL at 17:17

## 2021-04-19 RX ADMIN — BENZTROPINE MESYLATE 1 MG: 0.5 TABLET ORAL at 23:36

## 2021-04-19 ASSESSMENT — ACTIVITIES OF DAILY LIVING (ADL)
ADLS_ACUITY_SCORE: 11

## 2021-04-19 NOTE — PLAN OF CARE
DATE & TIME: 4/19/21 0550-1210  Cognitive Concerns/ Orientation: A/O x1, forgetful, restless at times   BEHAVIOR & AGGRESSION TOOL COLOR: Green (yellow at times)  ABNL VS/O2: VSS on RA  MOBILITY: Independent in room, SBA in halls.   PAIN MANAGMENT: Denies  DIET: Regular; good appetite. Needs assistance with ordering meals  BOWEL/BLADDER: Continent  SKIN: Intact/dry   D/C DATE: Discharge pending guardianship, SW following  OTHER IMPORTANT INFO: Pt slept on/off, this shift, Haldol given as scheduled, ambulated in halls x2, shower completed, door alarm on.

## 2021-04-19 NOTE — PROGRESS NOTES
[facility]    Medicine Progress Note - Hospitalist Service       Date of Admission:  9/9/2020  Date of Service: 04/19/2021    Assessment & Plan          John Juárez is a 55 year old male with PMHx of schizophrenia, hx of TBI, alcohol use disorder, COPD, hyperlipidemia and hypothyroidism who was admitted on 9/9/2020 for evaluation of aggressive behavior at his group home.       Was evaluated by Psychiatry and his condition stabilized, though his group home was unwilling to take him back and thus hospitalization has been prolonged awaiting new placement and guardianship. Under civil commitment through [facility]     Neurocognitive disorder dt hx of TBI and alcohol use disorder  Schizophrenia  Under commitment  Had been residing in group home prior to admission.  Brought to hospital for evaluation of aggressive behaviors. Group home now unwilling to take him back. Has had numerous CODE 21s called this stay. Seen by psych, meds adjusted. Is currently under civil commitment and court hold through [facility] until 7/31/21.   -- conts on Haldol 10mg TID (timed for when patient tends to be most agitated), memantine 5mg daily, venlafaxine 75mg daily, benztropine 1mg BID   -- prns available for agitation (including Haldol, Zyprexa and Ativan)  -- SW following for placement, placement will occur once guardianship is established     Possible Tardive Dyskinesia vs EPSE  Per psych assessment on 4/1/21, noted to have possible tardive dyskinesia vs extrapyramidal side effects of meds. At that time, recommended to increase Cogentin to 1mg BID and add vitamin E 800 international unit(s) daily.      Back cellulitis in 1/2021: Resolved  Noted on 1/24/21, initiated on cephalexin at that time. Received local wound care and completed 7-day course of antibiotics on 1/30     Baseline Hypotension  BP 90-110s systolic on average. Asymptomatic. No concerns     Hyperlipidemia  Chronic and stable on [facility]  dose ASA and  atorvastatin     COPD  Not O2 dependent. Chronic and stable on salmeterol, albuterol prn     Hypothyroidism  Chronic and stable on levothyroxine     Tobacco use disorder  Using nicotine patch and PRN gum     Resting tremor of upper extremities  No acute issues           Diet: Room Service  Diet  Combination Diet Regular Diet Adult; Safe Tray - with utensils  Snacks/Supplements Adult: Other; Bedtime snack; Between Meals  Room Service    DVT Prophylaxis: Pneumatic Compression Devices  Valentin Catheter: not present  Code Status: Full Code           Disposition Plan   Expected discharge: when placement found    Entered: Jose Walker MD 04/19/2021, 5:06 PM       The patient's care was discussed with the Bedside Nurse and Patient.    Jose Walker MD  Hospitalist Service  [facility]  Contact information available via [facility] Paging/Directory    ______________________________________________________________________    Interval History      No acute events overnight  No complaints today, walking hallways  Door alarm active.    No other nursing concerns.    Data reviewed today: I reviewed all medications, new labs and imaging results over the last 24 hours.    Physical Exam   Vital Signs: Temp: 97.4  F (36.3  C) Temp src: Oral BP: 97/64 Pulse: 92   Resp: 18 SpO2: 99 % O2 Device: None (Room air)    Weight: 185 lbs 9.6 oz    Exam:  Constitutional:no apparent distress  Cardiovascular: RRR.  No  Murmurs.  Respiratory:normal WOB,b/l equal air entry, no wheezes or crackles   Gastrointestinal: Abdomen soft, non-tender. BS normal. No masses, organomegaly  Extremities :no edema , no clubbing or cyanosis      Data   No lab results found in last 7 days.    No results found for this or any previous visit (from the past 24 hour(s)).  Medications       aspirin  81 mg Oral Daily     atorvastatin  80 mg Oral At Bedtime     benztropine  1 mg Oral BID     docusate sodium  100 mg Oral BID     haloperidol  10 mg  Oral TID     levothyroxine  88 mcg Oral Daily     memantine  5 mg Oral Daily     salmeterol  1 puff Inhalation BID     venlafaxine  75 mg Oral Daily with breakfast     vitamin E  800 Units Oral Daily

## 2021-04-19 NOTE — PLAN OF CARE
DATE & TIME: 4/18/2021 3404-1335  Cognitive Concerns/ Orientation: A/O x1, forgetful, restless at times   BEHAVIOR & AGGRESSION TOOL COLOR: Green (yellow at times)  ABNL VS/O2: VSS on RA  MOBILITY: Independent in room, SBA in halls.   PAIN MANAGMENT: Denies  DIET: Regular; good appetite. Needs assistance with ordering meals  BOWEL/BLADDER: Continent  SKIN: Intact/dry   D/C DATE: Discharge pending guardianship, SW following  OTHER IMPORTANT INFO: Pt slept on/off, this shift, Haldol given as scheduled, ambulated in halls x2, door alarm on.

## 2021-04-20 PROCEDURE — 250N000013 HC RX MED GY IP 250 OP 250 PS 637: Performed by: INTERNAL MEDICINE

## 2021-04-20 PROCEDURE — 250N000013 HC RX MED GY IP 250 OP 250 PS 637: Performed by: PSYCHIATRY & NEUROLOGY

## 2021-04-20 PROCEDURE — 250N000013 HC RX MED GY IP 250 OP 250 PS 637: Performed by: HOSPITALIST

## 2021-04-20 PROCEDURE — 120N000001 HC R&B MED SURG/OB

## 2021-04-20 PROCEDURE — 99231 SBSQ HOSP IP/OBS SF/LOW 25: CPT | Performed by: INTERNAL MEDICINE

## 2021-04-20 RX ADMIN — HALOPERIDOL 10 MG: 5 TABLET ORAL at 21:17

## 2021-04-20 RX ADMIN — LEVOTHYROXINE SODIUM 88 MCG: 88 TABLET ORAL at 08:52

## 2021-04-20 RX ADMIN — HALOPERIDOL 10 MG: 5 TABLET ORAL at 08:52

## 2021-04-20 RX ADMIN — BENZTROPINE MESYLATE 1 MG: 0.5 TABLET ORAL at 21:17

## 2021-04-20 RX ADMIN — ASPIRIN 81 MG: 81 TABLET, COATED ORAL at 08:53

## 2021-04-20 RX ADMIN — Medication 800 UNITS: at 08:53

## 2021-04-20 RX ADMIN — MEMANTINE 5 MG: 5 TABLET ORAL at 08:53

## 2021-04-20 RX ADMIN — ATORVASTATIN CALCIUM 80 MG: 40 TABLET, FILM COATED ORAL at 21:16

## 2021-04-20 RX ADMIN — BENZTROPINE MESYLATE 1 MG: 0.5 TABLET ORAL at 08:52

## 2021-04-20 RX ADMIN — SALMETEROL XINAFOATE 1 PUFF: 50 POWDER, METERED ORAL; RESPIRATORY (INHALATION) at 08:55

## 2021-04-20 RX ADMIN — SALMETEROL XINAFOATE 1 PUFF: 50 POWDER, METERED ORAL; RESPIRATORY (INHALATION) at 21:17

## 2021-04-20 RX ADMIN — VENLAFAXINE HYDROCHLORIDE 75 MG: 75 CAPSULE, EXTENDED RELEASE ORAL at 08:52

## 2021-04-20 RX ADMIN — OLANZAPINE 5 MG: 5 TABLET, ORALLY DISINTEGRATING ORAL at 19:03

## 2021-04-20 RX ADMIN — DOCUSATE SODIUM 100 MG: 100 CAPSULE, LIQUID FILLED ORAL at 08:52

## 2021-04-20 ASSESSMENT — ACTIVITIES OF DAILY LIVING (ADL)
ADLS_ACUITY_SCORE: 11

## 2021-04-20 NOTE — PLAN OF CARE
DATE & TIME: 4/19/21 3100-7243   Cognitive Concerns/ Orientation: Alert to self. Was awake around 2330 then has been calm/quiet and sleeping all night.   BEHAVIOR & AGGRESSION TOOL COLOR: Yellow   ABNL VS/O2: Daily vitals, WDL  MOBILITY: Independent in room, SBA in halls.   PAIN MANAGMENT: Denies  DIET: Regular. Needs assistance with ordering meals  BOWEL/BLADDER: Continent  SKIN: Intact/dry   D/C DATE: Discharge pending guardianship, SW following.  OTHER IMPORTANT INFO: Pt slept soundly all night. Stable, no issues or complaints. Door alarm in place.

## 2021-04-20 NOTE — PROGRESS NOTES
[facility]    Medicine Progress Note - Hospitalist Service       Date of Admission:  9/9/2020  Date of Service: 04/20/2021    Assessment & Plan          John Juárez is a 55 year old male with PMHx of schizophrenia, hx of TBI, alcohol use disorder, COPD, hyperlipidemia and hypothyroidism who was admitted on 9/9/2020 for evaluation of aggressive behavior at his group home.       Was evaluated by Psychiatry and his condition stabilized, though his group home was unwilling to take him back and thus hospitalization has been prolonged awaiting new placement and guardianship. Under civil commitment through [facility]     Neurocognitive disorder dt hx of TBI and alcohol use disorder  Schizophrenia  Under commitment  Had been residing in group home prior to admission.  Brought to hospital for evaluation of aggressive behaviors. Group home now unwilling to take him back. Has had numerous CODE 21s called this stay. Seen by psych, meds adjusted. Is currently under civil commitment and court hold through [facility] until 7/31/21.   --Continue on Haldol 10mg TID (timed for when patient tends to be most agitated), memantine 5mg daily, venlafaxine 75mg daily, benztropine 1mg BID   -- prns available for agitation (including Haldol, Zyprexa and Ativan)  -- SW following for placement, placement will occur once guardianship is established     Possible Tardive Dyskinesia vs EPSE  Per psych assessment on 4/1/21, noted to have possible tardive dyskinesia vs extrapyramidal side effects of meds. At that time, recommended to increase Cogentin to 1mg BID and add vitamin E 800 international unit(s) daily.      Back cellulitis in 1/2021: Resolved  Noted on 1/24/21, initiated on cephalexin at that time. Received local wound care and completed 7-day course of antibiotics on 1/30     Baseline Hypotension  BP 90-110s systolic on average. Asymptomatic. No concerns     Hyperlipidemia  Chronic and stable on  llow dose ASA and  atorvastatin     COPD  Not O2 dependent. Chronic and stable on salmeterol, albuterol prn     Hypothyroidism  Chronic and stable on levothyroxine     Tobacco use disorder  Using nicotine patch and PRN gum     Resting tremor of upper extremities  No acute issues           Diet: Room Service  Diet  Combination Diet Regular Diet Adult; Safe Tray - with utensils  Snacks/Supplements Adult: Other; Bedtime snack; Between Meals  Room Service    DVT Prophylaxis: Pneumatic Compression Devices  Valentin Catheter: not present  Code Status: Full Code           Disposition Plan   Expected discharge: when placement found    Entered: Yuki Chawla MD 04/20/2021, 8:19 AM       The patient's care was discussed with the Bedside Nurse and Patient.    Yuki Chawla MD  Hospitalist Service  [facility]  Contact information available via [facility] Paging/Directory    ______________________________________________________________________    Interval History      No events noted overnight, patient was very sleepy when I visited with him.    Data reviewed today: I reviewed all medications, new labs and imaging results over the last 24 hours.    Physical Exam   Vital Signs: Temp: 97.4  F (36.3  C) Temp src: Oral BP: 90/61 Pulse: 70   Resp: 16 SpO2: 96 % O2 Device: None (Room air)    Weight: 185 lbs 9.6 oz    Exam:  Constitutional:no apparent distress  Cardiovascular: RRR.  No  Murmurs.  Respiratory:normal WOB,b/l equal air entry, no wheezes or crackles   Gastrointestinal: Abdomen soft, non-tender. BS normal. No masses, organomegaly  Extremities :no edema , no clubbing or cyanosis      Data   No lab results found in last 7 days.    No results found for this or any previous visit (from the past 24 hour(s)).  Medications       aspirin  81 mg Oral Daily     atorvastatin  80 mg Oral At Bedtime     benztropine  1 mg Oral BID     docusate sodium  100 mg Oral BID     haloperidol  10 mg Oral TID      levothyroxine  88 mcg Oral Daily     memantine  5 mg Oral Daily     salmeterol  1 puff Inhalation BID     venlafaxine  75 mg Oral Daily with breakfast     vitamin E  800 Units Oral Daily

## 2021-04-20 NOTE — PLAN OF CARE
"DATE & TIME: 4/19/21 3-11pm shift  Cognitive Concerns/ Orientation: A/O x1, forgetful, restless and agitated talking about a voice bothering him \"in my head\" given ativan and zyprexa this jennie  BEHAVIOR & AGGRESSION TOOL COLOR: Green (yellow at times)  ABNL VS/O2: VSS on RA, daily vitals  MOBILITY: Independent in room, SBA in halls.   PAIN MANAGMENT: Denies  DIET: Regular; good appetite. Needs assistance with ordering meals  BOWEL/BLADDER: Continent  SKIN: Intact/dry   D/C DATE: Discharge pending guardianship, SW following  OTHER IMPORTANT INFO: patient was agitated this shift by voices \"in my head\", zyprexa helped but took ativan prior to discovery of voices, Haldol is scheduled but Sleeping soundly after ativan and zyprexa so allowed to sleep instead of waking for night time meds that include haldol. Informed oncoming nurse. ambulated in halls x2, showered this morning, door alarm on.   "

## 2021-04-21 PROCEDURE — 250N000013 HC RX MED GY IP 250 OP 250 PS 637: Performed by: HOSPITALIST

## 2021-04-21 PROCEDURE — 99231 SBSQ HOSP IP/OBS SF/LOW 25: CPT | Performed by: INTERNAL MEDICINE

## 2021-04-21 PROCEDURE — 120N000001 HC R&B MED SURG/OB

## 2021-04-21 PROCEDURE — 250N000013 HC RX MED GY IP 250 OP 250 PS 637: Performed by: PSYCHIATRY & NEUROLOGY

## 2021-04-21 PROCEDURE — 250N000013 HC RX MED GY IP 250 OP 250 PS 637: Performed by: INTERNAL MEDICINE

## 2021-04-21 PROCEDURE — 87635 SARS-COV-2 COVID-19 AMP PRB: CPT | Performed by: INTERNAL MEDICINE

## 2021-04-21 RX ADMIN — HALOPERIDOL 10 MG: 5 TABLET ORAL at 09:10

## 2021-04-21 RX ADMIN — SALMETEROL XINAFOATE 1 PUFF: 50 POWDER, METERED ORAL; RESPIRATORY (INHALATION) at 09:13

## 2021-04-21 RX ADMIN — HALOPERIDOL 10 MG: 5 TABLET ORAL at 14:48

## 2021-04-21 RX ADMIN — ATORVASTATIN CALCIUM 80 MG: 40 TABLET, FILM COATED ORAL at 21:12

## 2021-04-21 RX ADMIN — OLANZAPINE 10 MG: 5 TABLET, ORALLY DISINTEGRATING ORAL at 09:09

## 2021-04-21 RX ADMIN — MEMANTINE 5 MG: 5 TABLET ORAL at 09:09

## 2021-04-21 RX ADMIN — Medication 800 UNITS: at 09:10

## 2021-04-21 RX ADMIN — LEVOTHYROXINE SODIUM 88 MCG: 88 TABLET ORAL at 09:10

## 2021-04-21 RX ADMIN — SALMETEROL XINAFOATE 1 PUFF: 50 POWDER, METERED ORAL; RESPIRATORY (INHALATION) at 21:11

## 2021-04-21 RX ADMIN — ASPIRIN 81 MG: 81 TABLET, COATED ORAL at 09:10

## 2021-04-21 RX ADMIN — LORAZEPAM 1 MG: 1 TABLET ORAL at 17:47

## 2021-04-21 RX ADMIN — BENZTROPINE MESYLATE 1 MG: 0.5 TABLET ORAL at 21:12

## 2021-04-21 RX ADMIN — VENLAFAXINE HYDROCHLORIDE 75 MG: 75 CAPSULE, EXTENDED RELEASE ORAL at 09:09

## 2021-04-21 RX ADMIN — HALOPERIDOL 10 MG: 5 TABLET ORAL at 21:15

## 2021-04-21 RX ADMIN — BENZTROPINE MESYLATE 1 MG: 0.5 TABLET ORAL at 09:10

## 2021-04-21 RX ADMIN — DOCUSATE SODIUM 100 MG: 100 CAPSULE, LIQUID FILLED ORAL at 09:10

## 2021-04-21 ASSESSMENT — ACTIVITIES OF DAILY LIVING (ADL)
ADLS_ACUITY_SCORE: 11

## 2021-04-21 NOTE — PLAN OF CARE
DATE & TIME: 4/20/21 2300-0730  Cognitive Concerns/ Orientation: A & O x1-2  BEHAVIOR & AGGRESSION TOOL COLOR: Yellow. Green over night  ABNL VS/O2: Daily vitals, WDL.   MOBILITY: Independent in room, SBA in halls.   PAIN MANAGMENT: Denies  DIET: Regular. Needs assistance with ordering meals  BOWEL/BLADDER: Continent  SKIN: Intact/dry   D/C DATE: Discharge pending guardianship, SW following.  OTHER IMPORTANT INFO: Pt is cooperative but very frustrated/angry with hold. Door alarm in place. Stable over night, slept soundly.

## 2021-04-21 NOTE — PLAN OF CARE
DATE & TIME: 4/20/21 3-11p  Cognitive Concerns/ Orientation: A/Ox2 self/place?  BEHAVIOR & AGGRESSION TOOL COLOR: Yellow - restless/anxious at times  ABNL VS/O2: VSS on RA  MOBILITY: Independent in room, SBA in halls. Ambulated x2 this shift.  PAIN MANAGMENT: Denies  DIET: Regular. Needs assistance with ordering meals  BOWEL/BLADDER: Continent  SKIN: Intact/dry   D/C DATE: Discharge pending guardianship, SW following.  OTHER IMPORTANT INFO: Pt is cooperative but very frustrated/angry with hold. Door alarm in place. Zyprexa given x1 for increases anxiety/agitation, on scheduled Haldol.

## 2021-04-21 NOTE — PLAN OF CARE
DATE & TIME: 4/21/21 4458-0279    Cognitive Concerns/ Orientation : Alert to person  BEHAVIOR & AGGRESSION TOOL COLOR: Yellow this AM.  Wanting to leave in a cab.  Became agitated  CIWA SCORE: Na  ABNL VS/O2: VSS  MOBILITY: Ind  PAIN MANAGMENT: denies  DIET: Regular  BOWEL/BLADDER: Continent  ABNL LAB/BG: COVID test done today. Negative  DRAIN/DEVICES: NA  TELEMETRY RHYTHM: NA  SKIN: Bruising  TESTS/PROCEDURES: NA  D/C DATE: Court hold.  Pending placement  Discharge Barriers: Court hold  OTHER IMPORTANT INFO: Pt became agitated this AM.  Stating a cab was waiting for him.  Was slamming door to room.  Zyprexa given.  Pt remained calm the rest of the shift

## 2021-04-21 NOTE — PROGRESS NOTES
[facility]    Medicine Progress Note - Hospitalist Service       Date of Admission:  9/9/2020  Date of Service: 04/21/2021    Assessment & Plan          John Juárez is a 55 year old male with PMHx of schizophrenia, hx of TBI, alcohol use disorder, COPD, hyperlipidemia and hypothyroidism who was admitted on 9/9/2020 for evaluation of aggressive behavior at his group home.       Was evaluated by Psychiatry and his condition stabilized, though his group home was unwilling to take him back and thus hospitalization has been prolonged awaiting new placement and guardianship. Under civil commitment through [facility]     Neurocognitive disorder dt hx of TBI and alcohol use disorder  Schizophrenia  Under commitment  Had been residing in group home prior to admission.  Brought to hospital for evaluation of aggressive behaviors. Group home now unwilling to take him back. Has had numerous CODE 21s called this stay. Seen by psych, meds adjusted. Is currently under civil commitment and court hold through [facility] until 7/31/21.   --Continue on Haldol 10mg TID (timed for when patient tends to be most agitated), memantine 5mg daily, venlafaxine 75mg daily, benztropine 1mg BID   -- prns available for agitation (including Haldol, Zyprexa and Ativan)  -- SW following for placement, placement will occur once guardianship is established     Possible Tardive Dyskinesia vs EPSE  Per psych assessment on 4/1/21, noted to have possible tardive dyskinesia vs extrapyramidal side effects of meds. At that time, recommended to increase Cogentin to 1mg BID and add vitamin E 800 international unit(s) daily.      Back cellulitis in 1/2021: Resolved  Noted on 1/24/21, initiated on cephalexin at that time. Received local wound care and completed 7-day course of antibiotics on 1/30     Baseline Hypotension  BP 90-110s systolic on average. Asymptomatic. No concerns     Hyperlipidemia  Chronic and stable on  llow dose ASA and  atorvastatin     COPD  Not O2 dependent. Chronic and stable on salmeterol, albuterol prn     Hypothyroidism  Chronic and stable on levothyroxine     Tobacco use disorder  Using nicotine patch and PRN gum     Resting tremor of upper extremities  No acute issues           Diet: Room Service  Diet  Combination Diet Regular Diet Adult; Safe Tray - with utensils  Snacks/Supplements Adult: Other; Bedtime snack; Between Meals  Room Service    DVT Prophylaxis: Pneumatic Compression Devices  Valentin Catheter: not present  Code Status: Full Code           Disposition Plan   Expected discharge: when placement found    Entered: Yuki Chawla MD 04/21/2021, 2:15 PM       The patient's care was discussed with the Bedside Nurse and Patient.    Yuki Chawla MD  Hospitalist Service  [facility]  Contact information available via [facility] Paging/Directory    ______________________________________________________________________    Interval History      Overnight events noted, patient appeared to be quite frustrated overnight but currently he was quite sleepy when I visited with him.    Data reviewed today: I reviewed all medications, new labs and imaging results over the last 24 hours.    Physical Exam   Vital Signs: Temp: 97.3  F (36.3  C) Temp src: Oral BP: 121/86 Pulse: 74   Resp: 16 SpO2: 98 % O2 Device: None (Room air)    Weight: 185 lbs 9.6 oz    Exam:  Constitutional:no apparent distress, sleepy  Cardiovascular: RRR.  No  Murmurs.  Respiratory:normal WOB,b/l equal air entry, no wheezes or crackles   Gastrointestinal: Abdomen soft, non-tender. BS normal. No masses, organomegaly  Extremities :no edema , no clubbing or cyanosis      Data   No lab results found in last 7 days.    No results found for this or any previous visit (from the past 24 hour(s)).  Medications       aspirin  81 mg Oral Daily     atorvastatin  80 mg Oral At Bedtime     benztropine  1 mg Oral BID     docusate sodium   100 mg Oral BID     haloperidol  10 mg Oral TID     levothyroxine  88 mcg Oral Daily     memantine  5 mg Oral Daily     salmeterol  1 puff Inhalation BID     venlafaxine  75 mg Oral Daily with breakfast     vitamin E  800 Units Oral Daily

## 2021-04-22 PROCEDURE — 250N000013 HC RX MED GY IP 250 OP 250 PS 637: Performed by: NURSE PRACTITIONER

## 2021-04-22 PROCEDURE — 250N000013 HC RX MED GY IP 250 OP 250 PS 637: Performed by: HOSPITALIST

## 2021-04-22 PROCEDURE — 120N000001 HC R&B MED SURG/OB

## 2021-04-22 PROCEDURE — 250N000013 HC RX MED GY IP 250 OP 250 PS 637: Performed by: PSYCHIATRY & NEUROLOGY

## 2021-04-22 PROCEDURE — 99231 SBSQ HOSP IP/OBS SF/LOW 25: CPT | Performed by: INTERNAL MEDICINE

## 2021-04-22 PROCEDURE — 250N000013 HC RX MED GY IP 250 OP 250 PS 637: Performed by: INTERNAL MEDICINE

## 2021-04-22 RX ADMIN — SALMETEROL XINAFOATE 1 PUFF: 50 POWDER, METERED ORAL; RESPIRATORY (INHALATION) at 08:10

## 2021-04-22 RX ADMIN — LEVOTHYROXINE SODIUM 88 MCG: 88 TABLET ORAL at 08:07

## 2021-04-22 RX ADMIN — DOCUSATE SODIUM 100 MG: 100 CAPSULE, LIQUID FILLED ORAL at 08:06

## 2021-04-22 RX ADMIN — Medication 800 UNITS: at 08:07

## 2021-04-22 RX ADMIN — DOCUSATE SODIUM 100 MG: 100 CAPSULE, LIQUID FILLED ORAL at 20:54

## 2021-04-22 RX ADMIN — VENLAFAXINE HYDROCHLORIDE 75 MG: 75 CAPSULE, EXTENDED RELEASE ORAL at 08:07

## 2021-04-22 RX ADMIN — BENZTROPINE MESYLATE 1 MG: 0.5 TABLET ORAL at 08:07

## 2021-04-22 RX ADMIN — NICOTINE POLACRILEX 2 MG: 2 GUM, CHEWING ORAL at 13:17

## 2021-04-22 RX ADMIN — HALOPERIDOL 10 MG: 5 TABLET ORAL at 08:06

## 2021-04-22 RX ADMIN — HALOPERIDOL 10 MG: 5 TABLET ORAL at 20:54

## 2021-04-22 RX ADMIN — LORAZEPAM 1 MG: 1 TABLET ORAL at 14:11

## 2021-04-22 RX ADMIN — ASPIRIN 81 MG: 81 TABLET, COATED ORAL at 08:06

## 2021-04-22 RX ADMIN — SALMETEROL XINAFOATE 1 PUFF: 50 POWDER, METERED ORAL; RESPIRATORY (INHALATION) at 20:55

## 2021-04-22 RX ADMIN — ATORVASTATIN CALCIUM 80 MG: 40 TABLET, FILM COATED ORAL at 20:54

## 2021-04-22 RX ADMIN — OLANZAPINE 10 MG: 5 TABLET, ORALLY DISINTEGRATING ORAL at 08:59

## 2021-04-22 RX ADMIN — BENZTROPINE MESYLATE 1 MG: 0.5 TABLET ORAL at 20:55

## 2021-04-22 RX ADMIN — MEMANTINE 5 MG: 5 TABLET ORAL at 08:07

## 2021-04-22 RX ADMIN — HALOPERIDOL 10 MG: 5 TABLET ORAL at 14:12

## 2021-04-22 ASSESSMENT — ACTIVITIES OF DAILY LIVING (ADL)
ADLS_ACUITY_SCORE: 11

## 2021-04-22 NOTE — PROGRESS NOTES
DATE & TIME: 4/22/21 2872-5989.   Cognitive Concerns/ Orientation: A&O x1-2  BEHAVIOR & AGGRESSION TOOL COLOR: Green this shift, can turn Yellow.   ABNL VS/O2: VSS on RA, WDL  MOBILITY: Independent in room, SBA in halls.   PAIN MANAGMENT: Denies  DIET: Regular. Needs assistance with ordering meals  BOWEL/BLADDER: Continent  ABNL LAB/BG: No new labs  DRAIN/DEVICES: No IV access  SKIN: Intact/dry   D/C DATE: Discharge pending guardianship & placement. Court hold.  OTHER IMPORTANT INFO: Pt was cooperative pleasant this shift, up in chair for meal, and mostly in bed. Took medication without any problem. No PRNs given. Door alarm in place.

## 2021-04-22 NOTE — PLAN OF CARE
"DATE & TIME: 4/21/21 5262-0080   Cognitive Concerns/ Orientation: A & O x1-2  BEHAVIOR & AGGRESSION TOOL COLOR: Green over night, can turn Yellow.   ABNL VS/O2: Daily vitals, WDL  MOBILITY: Independent in room, SBA in halls.   PAIN MANAGMENT: Denies  DIET: Regular. Needs assistance with ordering meals  BOWEL/BLADDER: Continent  SKIN: Intact/dry   D/C DATE: Discharge pending guardianship, SW following.  OTHER IMPORTANT INFO: Pt was cooperative pleasant over night, was awake more tonight. Stated he won the \"lottery\". Redirectable all night, no PRNs given. Door alarm in place.  "

## 2021-04-22 NOTE — PLAN OF CARE
DATE & TIME: 4/21/21 3-11p  Cognitive Concerns/ Orientation: A/O x1-2  BEHAVIOR & AGGRESSION TOOL COLOR: green tonight, restless at times  ABNL VS/O2: VSS on RA  MOBILITY: Independent in room, SBA in halls.   PAIN MANAGMENT: Denies  DIET: Regular. Needs assistance with ordering meals  BOWEL/BLADDER: Continent  SKIN: Intact/dry   D/C DATE: Discharge pending guardianship, SW following.  OTHER IMPORTANT INFO: Pt is cooperative but very frustrated/angry with hold. Door alarm in place.

## 2021-04-22 NOTE — PROGRESS NOTES
DATE & TIME: 4/22/2021 1940-1753   Cognitive Concerns/ Orientation : A&O 1-2   BEHAVIOR & AGGRESSION TOOL COLOR: Green.  Can be yellow at times.  Zyprexa PRN given this AM due to increase anxiety and agitation.  Ativan given this afternoon due to increase agitation.  Slamming doors and swearing at staff.  CIWA SCORE: NA  ABNL VS/O2: VSS  MOBILITY: Ind  PAIN MANAGMENT: Denies  DIET: Regular  BOWEL/BLADDER: Continent  ABNL LAB/BG: No new labs  DRAIN/DEVICES: No IV access  TELEMETRY RHYTHM: NA  SKIN: Refuses skin assessment  TESTS/PROCEDURES: NA  D/C DATE: Pending placement.  Court hold

## 2021-04-23 PROCEDURE — 120N000001 HC R&B MED SURG/OB

## 2021-04-23 PROCEDURE — 250N000013 HC RX MED GY IP 250 OP 250 PS 637: Performed by: PSYCHIATRY & NEUROLOGY

## 2021-04-23 PROCEDURE — 99231 SBSQ HOSP IP/OBS SF/LOW 25: CPT | Performed by: INTERNAL MEDICINE

## 2021-04-23 PROCEDURE — 250N000013 HC RX MED GY IP 250 OP 250 PS 637: Performed by: INTERNAL MEDICINE

## 2021-04-23 PROCEDURE — 250N000013 HC RX MED GY IP 250 OP 250 PS 637: Performed by: HOSPITALIST

## 2021-04-23 RX ADMIN — Medication 800 UNITS: at 08:07

## 2021-04-23 RX ADMIN — SALMETEROL XINAFOATE 1 PUFF: 50 POWDER, METERED ORAL; RESPIRATORY (INHALATION) at 08:07

## 2021-04-23 RX ADMIN — MEMANTINE 5 MG: 5 TABLET ORAL at 08:07

## 2021-04-23 RX ADMIN — ASPIRIN 81 MG: 81 TABLET, COATED ORAL at 08:07

## 2021-04-23 RX ADMIN — DOCUSATE SODIUM 100 MG: 100 CAPSULE, LIQUID FILLED ORAL at 20:49

## 2021-04-23 RX ADMIN — BENZTROPINE MESYLATE 1 MG: 0.5 TABLET ORAL at 08:07

## 2021-04-23 RX ADMIN — DOCUSATE SODIUM 100 MG: 100 CAPSULE, LIQUID FILLED ORAL at 08:07

## 2021-04-23 RX ADMIN — HALOPERIDOL 10 MG: 5 TABLET ORAL at 08:07

## 2021-04-23 RX ADMIN — ATORVASTATIN CALCIUM 80 MG: 40 TABLET, FILM COATED ORAL at 20:49

## 2021-04-23 RX ADMIN — VENLAFAXINE HYDROCHLORIDE 75 MG: 75 CAPSULE, EXTENDED RELEASE ORAL at 08:07

## 2021-04-23 RX ADMIN — LEVOTHYROXINE SODIUM 88 MCG: 88 TABLET ORAL at 08:07

## 2021-04-23 RX ADMIN — HALOPERIDOL 10 MG: 5 TABLET ORAL at 14:18

## 2021-04-23 RX ADMIN — HALOPERIDOL 10 MG: 5 TABLET ORAL at 20:50

## 2021-04-23 RX ADMIN — BENZTROPINE MESYLATE 1 MG: 0.5 TABLET ORAL at 20:49

## 2021-04-23 ASSESSMENT — ACTIVITIES OF DAILY LIVING (ADL)
ADLS_ACUITY_SCORE: 11

## 2021-04-23 NOTE — PLAN OF CARE
DATE & TIME: 4/23 5056-8491    Cognitive Concerns/ Orientation : A&OX4   BEHAVIOR & AGGRESSION TOOL COLOR: Green.  Can be yellow at times  CIWA SCORE: NA  ABNL VS/O2: VSS  MOBILITY: Ind  PAIN MANAGMENT: Denies  DIET: Regular  BOWEL/BLADDER: Continent  ABNL LAB/BG: No new labs  DRAIN/DEVICES: No IV access  TELEMETRY RHYTHM: NA  SKIN: Refuses full skin assessment  TESTS/PROCEDURES: NA  D/C DATE: Pending placement

## 2021-04-23 NOTE — PROGRESS NOTES
[facility]    Medicine Progress Note - Hospitalist Service       Date of Admission:  9/9/2020  Date of Service: 04/23/2021    Assessment & Plan          John Juárez is a 55 year old male with PMHx of schizophrenia, hx of TBI, alcohol use disorder, COPD, hyperlipidemia and hypothyroidism who was admitted on 9/9/2020 for evaluation of aggressive behavior at his group home.       Was evaluated by Psychiatry and his condition stabilized, though his group home was unwilling to take him back and thus hospitalization has been prolonged awaiting new placement and guardianship. Under civil commitment through [facility]     Neurocognitive disorder dt hx of TBI and alcohol use disorder  Schizophrenia  Under commitment  Had been residing in group home prior to admission.  Brought to hospital for evaluation of aggressive behaviors. Group home now unwilling to take him back. Has had numerous CODE 21s called this stay. Seen by psych, meds adjusted. Is currently under civil commitment and court hold through [facility] until 7/31/21.   --Continue on Haldol 10mg TID (timed for when patient tends to be most agitated), memantine 5mg daily, venlafaxine 75mg daily, benztropine 1mg BID   -- prns available for agitation (including Haldol, Zyprexa and Ativan)  -- SW following for placement, placement will occur once guardianship is established     Possible Tardive Dyskinesia vs EPSE  Per psych assessment on 4/1/21, noted to have possible tardive dyskinesia vs extrapyramidal side effects of meds. At that time, recommended to increase Cogentin to 1mg BID and add vitamin E 800 international unit(s) daily.      Back cellulitis in 1/2021: Resolved  Noted on 1/24/21, initiated on cephalexin at that time. Received local wound care and completed 7-day course of antibiotics on 1/30     Baseline Hypotension  BP 90-110s systolic on average. Asymptomatic. No concerns     Hyperlipidemia  Chronic and stable on  llow dose ASA and  atorvastatin     COPD  Not O2 dependent. Chronic and stable on salmeterol, albuterol prn     Hypothyroidism  Chronic and stable on levothyroxine     Tobacco use disorder  Using nicotine patch and PRN gum     Resting tremor of upper extremities  No acute issues           Diet: Room Service  Diet  Combination Diet Regular Diet Adult; Safe Tray - with utensils  Snacks/Supplements Adult: Other; Bedtime snack; Between Meals  Room Service    DVT Prophylaxis: Pneumatic Compression Devices  Valentin Catheter: not present  Code Status: Full Code           Disposition Plan   Expected discharge: when placement found    Entered: Yuki Chawla MD 04/23/2021, 2:02 PM       The patient's care was discussed with the Bedside Nurse and Patient.    Yuki Chawla MD  Hospitalist Service  [facility]  Contact information available via [facility] Paging/Directory    ______________________________________________________________________    Interval History    Notes reviewed from overnight, no new events, was sleeping when I visited with him.    Data reviewed today: I reviewed all medications, new labs and imaging results over the last 24 hours.    Physical Exam   Vital Signs:     BP: 116/82 Pulse: 108   Resp: 18 SpO2: 98 % O2 Device: None (Room air)    Weight: 185 lbs 9.6 oz    Exam:  Constitutional:no apparent distress, sleepy  Cardiovascular: RRR.  No  Murmurs.  Respiratory:normal WOB,b/l equal air entry, no wheezes or crackles   Gastrointestinal: Abdomen soft, non-tender. BS normal. No masses, organomegaly  Extremities :no edema , no clubbing or cyanosis      Data   No lab results found in last 7 days.    No results found for this or any previous visit (from the past 24 hour(s)).  Medications       aspirin  81 mg Oral Daily     atorvastatin  80 mg Oral At Bedtime     benztropine  1 mg Oral BID     docusate sodium  100 mg Oral BID     haloperidol  10 mg Oral TID     levothyroxine  88 mcg Oral  Daily     memantine  5 mg Oral Daily     salmeterol  1 puff Inhalation BID     venlafaxine  75 mg Oral Daily with breakfast     vitamin E  800 Units Oral Daily

## 2021-04-23 NOTE — PLAN OF CARE
DATE & TIME: 4/21/21 2300-0730   Cognitive Concerns/ Orientation: A/O x1-2  BEHAVIOR & AGGRESSION TOOL COLOR: Green over night, can turn Yellow.   ABNL VS/O2: Daily vitals, WDL  MOBILITY: Independent in room, SBA in halls.   PAIN MANAGMENT: Denies  DIET: Regular. Needs assistance with ordering meals  BOWEL/BLADDER: Continent  SKIN: Intact/dry   D/C DATE: Discharge pending guardianship, SW following.  OTHER IMPORTANT INFO: Pt was cooperative pleasant over night slept  well tonight.Door alarm in place.

## 2021-04-24 PROCEDURE — 250N000013 HC RX MED GY IP 250 OP 250 PS 637: Performed by: PSYCHIATRY & NEUROLOGY

## 2021-04-24 PROCEDURE — 250N000013 HC RX MED GY IP 250 OP 250 PS 637: Performed by: INTERNAL MEDICINE

## 2021-04-24 PROCEDURE — 250N000013 HC RX MED GY IP 250 OP 250 PS 637: Performed by: HOSPITALIST

## 2021-04-24 PROCEDURE — 120N000001 HC R&B MED SURG/OB

## 2021-04-24 PROCEDURE — 99231 SBSQ HOSP IP/OBS SF/LOW 25: CPT | Performed by: STUDENT IN AN ORGANIZED HEALTH CARE EDUCATION/TRAINING PROGRAM

## 2021-04-24 RX ADMIN — Medication 800 UNITS: at 08:42

## 2021-04-24 RX ADMIN — LORAZEPAM 1 MG: 1 TABLET ORAL at 22:00

## 2021-04-24 RX ADMIN — ASPIRIN 81 MG: 81 TABLET, COATED ORAL at 08:42

## 2021-04-24 RX ADMIN — SALMETEROL XINAFOATE 1 PUFF: 50 POWDER, METERED ORAL; RESPIRATORY (INHALATION) at 21:27

## 2021-04-24 RX ADMIN — VENLAFAXINE HYDROCHLORIDE 75 MG: 75 CAPSULE, EXTENDED RELEASE ORAL at 08:42

## 2021-04-24 RX ADMIN — DOCUSATE SODIUM 100 MG: 100 CAPSULE, LIQUID FILLED ORAL at 21:21

## 2021-04-24 RX ADMIN — MEMANTINE 5 MG: 5 TABLET ORAL at 08:42

## 2021-04-24 RX ADMIN — HALOPERIDOL 10 MG: 5 TABLET ORAL at 21:14

## 2021-04-24 RX ADMIN — DOCUSATE SODIUM 100 MG: 100 CAPSULE, LIQUID FILLED ORAL at 08:42

## 2021-04-24 RX ADMIN — LEVOTHYROXINE SODIUM 88 MCG: 88 TABLET ORAL at 08:42

## 2021-04-24 RX ADMIN — HALOPERIDOL 10 MG: 5 TABLET ORAL at 15:33

## 2021-04-24 RX ADMIN — SALMETEROL XINAFOATE 1 PUFF: 50 POWDER, METERED ORAL; RESPIRATORY (INHALATION) at 08:44

## 2021-04-24 RX ADMIN — BENZTROPINE MESYLATE 1 MG: 0.5 TABLET ORAL at 21:21

## 2021-04-24 RX ADMIN — BENZTROPINE MESYLATE 1 MG: 0.5 TABLET ORAL at 08:42

## 2021-04-24 RX ADMIN — ATORVASTATIN CALCIUM 80 MG: 40 TABLET, FILM COATED ORAL at 21:13

## 2021-04-24 RX ADMIN — HALOPERIDOL 10 MG: 5 TABLET ORAL at 08:42

## 2021-04-24 RX ADMIN — OLANZAPINE 10 MG: 5 TABLET, ORALLY DISINTEGRATING ORAL at 18:47

## 2021-04-24 ASSESSMENT — ACTIVITIES OF DAILY LIVING (ADL)
ADLS_ACUITY_SCORE: 11

## 2021-04-24 NOTE — PLAN OF CARE
DATE & TIME: 4/23/2021;l 1625-7628  Cognitive Concerns/ Orientation : A & O x 1 (self), pleasant, cooperative    BEHAVIOR & AGGRESSION TOOL COLOR: Green   CIWA SCORE: N/A   ABNL VS/O2: VSS on RA  MOBILITY: independent in room  PAIN MANAGMENT: denies   DIET: Regular, tolerating well, safe tray   BOWEL/BLADDER: BS active x 4; continent   ABNL LAB/BG: WDL   DRAIN/DEVICES: No IV   TELEMETRY RHYTHM: NA   SKIN: pale, face dry, intact   TESTS/PROCEDURES: NA   D/C DATE: pending   Discharge Barriers: placement   OTHER IMPORTANT INFO: door alarm in place

## 2021-04-24 NOTE — PROGRESS NOTES
"During rounds, bedside RN unable to locate patient in room or on unit at 1700 on 4/24/2021. Staff on 66 began searching the unit for patient. Security called for assistance. Patient found at door 6 approximately 5-10 minutes after search began. Per nursing staff patient was calm, cooperative, and smiling. Patient had no signs of aggression at this time. Patient wanted to \"go out and smoke\" per staff report. Patient agreed to go back to unit. [address] staff used a wheel chair to bring patient back up to unit. Bedside RN completed an assessment once patient was back on the unit. No signs of injury present, no changes in patient orientation observed. VSS on RA. Nursing will continue to monitor and assess.     Corrina Martinez RN on 4/24/2021 at 5:35 PM    "

## 2021-04-24 NOTE — PROGRESS NOTES
[facility]    Medicine Progress Note - Hospitalist Service       Date of Admission:  9/9/2020  Date of Service: 04/24/2021    Assessment & Plan          John Juárez is a 55 year old male with PMHx of schizophrenia, hx of TBI, alcohol use disorder, COPD, hyperlipidemia and hypothyroidism who was admitted on 9/9/2020 for evaluation of aggressive behavior at his group home.       Was evaluated by Psychiatry and his condition stabilized, though his group home was unwilling to take him back and thus hospitalization has been prolonged awaiting new placement and guardianship. Under civil commitment through [facility]     Neurocognitive disorder dt hx of TBI and alcohol use disorder  Schizophrenia  Under commitment  Had been residing in group home prior to admission.  Brought to hospital for evaluation of aggressive behaviors. Group home now unwilling to take him back. Has had numerous CODE 21s called this stay. Seen by psych, meds adjusted. Is currently under civil commitment and court hold through [facility] until 7/31/21.   --Continue on Haldol 10mg TID (timed for when patient tends to be most agitated), memantine 5mg daily, venlafaxine 75mg daily, benztropine 1mg BID   -- prns available for agitation (including Haldol, Zyprexa and Ativan)  -- SW following for placement, placement will occur once guardianship is established     Possible Tardive Dyskinesia vs EPSE  Per psych assessment on 4/1/21, noted to have possible tardive dyskinesia vs extrapyramidal side effects of meds. At that time, recommended to increase Cogentin to 1mg BID and add vitamin E 800 international unit(s) daily.      Back cellulitis in 1/2021: Resolved  Noted on 1/24/21, initiated on cephalexin at that time. Received local wound care and completed 7-day course of antibiotics on 1/30     Baseline Hypotension  BP 90-110s systolic on average. Asymptomatic. No concerns     Hyperlipidemia  Chronic and stable on  "llow dose ASA and  atorvastatin     COPD  Not O2 dependent. Chronic and stable on salmeterol, albuterol prn     Hypothyroidism  Chronic and stable on levothyroxine     Tobacco use disorder  Using nicotine patch and PRN gum     Resting tremor of upper extremities  No acute issues           Diet: Room Service  Diet  Combination Diet Regular Diet Adult; Safe Tray - with utensils  Snacks/Supplements Adult: Other; Bedtime snack; Between Meals  Room Service    DVT Prophylaxis: Pneumatic Compression Devices  Valentin Catheter: not present  Code Status: Full Code           Disposition Plan   Expected discharge: when placement found    Entered: Jose Walker MD 04/24/2021, 1:51 PM       The patient's care was discussed with the Bedside Nurse and Patient.    Jose Walker MD  Hospitalist Service  [facility]  Contact information available via [facility] Paging/Directory    ______________________________________________________________________    Interval History      Notes reviewed from overnight, no new events  \"when am I leaving?\" otherwise had no complaints today    Data reviewed today: I reviewed all medications, new labs and imaging results over the last 24 hours.    Physical Exam   Vital Signs: Temp: 97.6  F (36.4  C) Temp src: Oral BP: 110/81 Pulse: 106   Resp: 18 SpO2: 99 % O2 Device: None (Room air)    Weight: 185 lbs 9.6 oz    Exam:  Constitutional:no apparent distress, sleepy  Cardiovascular: RRR.  No  Murmurs.  Respiratory:normal WOB,b/l equal air entry, no wheezes or crackles   Gastrointestinal: Abdomen soft, non-tender. BS normal. No masses, organomegaly  Extremities :no edema , no clubbing or cyanosis      Data   No lab results found in last 7 days.    No results found for this or any previous visit (from the past 24 hour(s)).  Medications       aspirin  81 mg Oral Daily     atorvastatin  80 mg Oral At Bedtime     benztropine  1 mg Oral BID     docusate sodium  100 mg Oral BID     haloperidol  10 " mg Oral TID     levothyroxine  88 mcg Oral Daily     memantine  5 mg Oral Daily     salmeterol  1 puff Inhalation BID     venlafaxine  75 mg Oral Daily with breakfast     vitamin E  800 Units Oral Daily

## 2021-04-24 NOTE — PROGRESS NOTES
DATE & TIME: 04/23/2021 Night    Cognitive Concerns/ Orientation : Alert to self, cooperative   BEHAVIOR & AGGRESSION TOOL COLOR: Green   ABNL VS/O2: Once daily Vitals, room air  MOBILITY: Independent in room, SBA in halls  PAIN MANAGMENT: Denies  DIET: Regular, safe tray with utensils  BOWEL/BLADDER: Up to bathroom  ABNL LAB/BG: No labs since 3/6  DRAIN/DEVICES: No IV access  SKIN: Intact  D/C DATE: Pending guardianship and placement; SW following  OTHER IMPORTANT INFO: Pt used call light x1 this shift; Door alarm active

## 2021-04-24 NOTE — PLAN OF CARE
DATE & TIME: 04/24/21 4086-3790            Cognitive Concerns/ Orientation : Alert to self and place, cooperative   BEHAVIOR & AGGRESSION TOOL COLOR: Green     ABNL VS/O2: VSS on RA (Once daily vitals completed this shift)  MOBILITY: Independent in room, SBA in halls. Ambulated hallways x4  PAIN MANAGMENT: Denies  DIET: Regular, safe tray with utensils  BOWEL/BLADDER: Up to bathroom, continent  ABNL LAB/BG: No labs since 3/6  DRAIN/DEVICES: No IV access  SKIN: Intact, blanchable redness to back  D/C DATE: Pending guardianship and placement; SW following  OTHER IMPORTANT INFO: Door alarm active

## 2021-04-25 PROCEDURE — 250N000013 HC RX MED GY IP 250 OP 250 PS 637: Performed by: INTERNAL MEDICINE

## 2021-04-25 PROCEDURE — 120N000001 HC R&B MED SURG/OB

## 2021-04-25 PROCEDURE — 250N000013 HC RX MED GY IP 250 OP 250 PS 637: Performed by: PSYCHIATRY & NEUROLOGY

## 2021-04-25 PROCEDURE — 99231 SBSQ HOSP IP/OBS SF/LOW 25: CPT | Performed by: INTERNAL MEDICINE

## 2021-04-25 PROCEDURE — 250N000013 HC RX MED GY IP 250 OP 250 PS 637: Performed by: HOSPITALIST

## 2021-04-25 RX ADMIN — VENLAFAXINE HYDROCHLORIDE 75 MG: 75 CAPSULE, EXTENDED RELEASE ORAL at 08:23

## 2021-04-25 RX ADMIN — ASPIRIN 81 MG: 81 TABLET, COATED ORAL at 08:22

## 2021-04-25 RX ADMIN — BENZTROPINE MESYLATE 1 MG: 0.5 TABLET ORAL at 08:22

## 2021-04-25 RX ADMIN — SALMETEROL XINAFOATE 1 PUFF: 50 POWDER, METERED ORAL; RESPIRATORY (INHALATION) at 08:24

## 2021-04-25 RX ADMIN — OLANZAPINE 10 MG: 5 TABLET, ORALLY DISINTEGRATING ORAL at 19:20

## 2021-04-25 RX ADMIN — DOCUSATE SODIUM 100 MG: 100 CAPSULE, LIQUID FILLED ORAL at 08:23

## 2021-04-25 RX ADMIN — LEVOTHYROXINE SODIUM 88 MCG: 88 TABLET ORAL at 08:22

## 2021-04-25 RX ADMIN — ATORVASTATIN CALCIUM 80 MG: 40 TABLET, FILM COATED ORAL at 21:56

## 2021-04-25 RX ADMIN — MEMANTINE 5 MG: 5 TABLET ORAL at 08:22

## 2021-04-25 RX ADMIN — DOCUSATE SODIUM 100 MG: 100 CAPSULE, LIQUID FILLED ORAL at 21:56

## 2021-04-25 RX ADMIN — Medication 800 UNITS: at 08:22

## 2021-04-25 RX ADMIN — HALOPERIDOL 10 MG: 5 TABLET ORAL at 16:43

## 2021-04-25 RX ADMIN — HALOPERIDOL 10 MG: 5 TABLET ORAL at 08:23

## 2021-04-25 RX ADMIN — HALOPERIDOL 10 MG: 5 TABLET ORAL at 21:56

## 2021-04-25 RX ADMIN — BENZTROPINE MESYLATE 1 MG: 0.5 TABLET ORAL at 21:56

## 2021-04-25 ASSESSMENT — ACTIVITIES OF DAILY LIVING (ADL)
ADLS_ACUITY_SCORE: 11
ADLS_ACUITY_SCORE: 13
ADLS_ACUITY_SCORE: 11
ADLS_ACUITY_SCORE: 13
ADLS_ACUITY_SCORE: 11
ADLS_ACUITY_SCORE: 11

## 2021-04-25 NOTE — PROGRESS NOTES
[facility]    Medicine Progress Note - Hospitalist Service       Date of Admission:  9/9/2020  Date of Service: 04/25/2021    Assessment & Plan          John Juárez is a 55 year old male with PMHx of schizophrenia, hx of TBI, alcohol use disorder, COPD, hyperlipidemia and hypothyroidism who was admitted on 9/9/2020 for evaluation of aggressive behavior at his group home.       Was evaluated by Psychiatry and his condition stabilized, though his group home was unwilling to take him back and thus hospitalization has been prolonged awaiting new placement and guardianship. Under civil commitment through [facility]     Neurocognitive disorder dt hx of TBI and alcohol use disorder  Schizophrenia  Under commitment  Had been residing in group home prior to admission.  Brought to hospital for evaluation of aggressive behaviors. Group home now unwilling to take him back. Has had numerous CODE 21s called this stay. Seen by psych, meds adjusted. Is currently under civil commitment and court hold through [facility] until 7/31/21.   --Continue on Haldol 10mg TID (timed for when patient tends to be most agitated), memantine 5mg daily, venlafaxine 75mg daily, benztropine 1mg BID   -- prns available for agitation (including Haldol, Zyprexa and Ativan)  -- SW following for placement, placement will occur once guardianship is established     Possible Tardive Dyskinesia vs EPSE  Per psych assessment on 4/1/21, noted to have possible tardive dyskinesia vs extrapyramidal side effects of meds. At that time, recommended to increase Cogentin to 1mg BID and add vitamin E 800 international unit(s) daily.      Back cellulitis in 1/2021: Resolved  Noted on 1/24/21, initiated on cephalexin at that time. Received local wound care and completed 7-day course of antibiotics on 1/30     Baseline Hypotension  BP 90-110s systolic on average. Asymptomatic. No concerns     Hyperlipidemia  Chronic and stable on  "llow dose ASA and  atorvastatin     COPD  Not O2 dependent. Chronic and stable on salmeterol, albuterol prn     Hypothyroidism  Chronic and stable on levothyroxine     Tobacco use disorder  Using nicotine patch and PRN gum     Resting tremor of upper extremities  No acute issues           Diet: Room Service  Diet  Combination Diet Regular Diet Adult; Safe Tray - with utensils  Snacks/Supplements Adult: Other; Bedtime snack; Between Meals  Room Service    DVT Prophylaxis: Pneumatic Compression Devices  Valentin Catheter: not present  Code Status: Full Code           Disposition Plan   Expected discharge: when placement found    Entered: Yolanda Trinh MD 04/25/2021, 8:49 AM       The patient's care was discussed with the Bedside Nurse and Patient.    Yolanda Trinh MD  Hospitalist Service  [facility]  Contact information available via [facility] Paging/Directory    ______________________________________________________________________    Interval History    \"What?  Nothing.\"  Patient is in bed, he is drowsy, wakes somewhat reluctantly.  He is has no complaints.    Data reviewed today: I reviewed all medications, new labs and imaging results over the last 24 hours.    Physical Exam   Vital Signs: Temp: 97.4  F (36.3  C) Temp src: Oral BP: 116/78 Pulse: 86   Resp: 18 SpO2: 95 % O2 Device: None (Room air)    Weight: 185 lbs 9.6 oz    Exam:   Constitutional:Frail, somewhat disheveled elderly man, dozing, wakes to loud voice  Cardiovascular: RRR.  No  Murmurs.  Respiratory:normal WOB,b/l equal air entry, no wheezes or crackles   Gastrointestinal: Abdomen soft, non-tender. BS normal.  Extremities :no edema ,    Data   No lab results found in last 7 days.    No results found for this or any previous visit (from the past 24 hour(s)).  Medications       aspirin  81 mg Oral Daily     atorvastatin  80 mg Oral At Bedtime     benztropine  1 mg Oral BID     docusate sodium  100 mg Oral BID     haloperidol  10 " mg Oral TID     levothyroxine  88 mcg Oral Daily     memantine  5 mg Oral Daily     salmeterol  1 puff Inhalation BID     venlafaxine  75 mg Oral Daily with breakfast     vitamin E  800 Units Oral Daily

## 2021-04-25 NOTE — PLAN OF CARE
DATE & TIME: 4/24/2021; 7825-5808  Cognitive Concerns/ Orientation : A & O x 1 (self only), did have some audible and visual hallucinations on this shift, re-directable, safety ensured   BEHAVIOR & AGGRESSION TOOL COLOR: yellow, increased aggression and raised voice toward staff. Food drink offered, ambulation encouraged, Zyprexa, ativan PO given x 1 with effect  CIWA SCORE: N/A   ABNL VS/O2: VSS on RA, daily vitals   MOBILITY: independent in room, SBA in halls, frequently ambulating in the halls   PAIN MANAGMENT: Denies   DIET: Regular, safe tray with utensils   BOWEL/BLADDER: BS active x 4; continent  ABNL LAB/BG:   DRAIN/DEVICES: Up to the bathroom, continent   TELEMETRY RHYTHM: NA   SKIN: pale, dry, redness to back and knee caps  TESTS/PROCEDURES: NA  D/C DATE: pending guardianship and placement, SW following at this time  Discharge Barriers: placement   OTHER IMPORTANT INFO: See additional progress note; Door alarm in place

## 2021-04-25 NOTE — PLAN OF CARE
DATE & TIME: 4/25/21 7739-8410  Cognitive Concerns/ Orientation : Alert to self.  BEHAVIOR & AGGRESSION TOOL COLOR: Green, yellow at times  ABNL VS/O2: VSS on RA, once daily vitals completed this shift  MOBILITY: Independent in room, SBA in halls  PAIN MANAGMENT: Denies  DIET: Reg with safe tray utensils  BOWEL/BLADDER: Continent, up to bathroom  ABNL LAB/BG:   SKIN: pale and dry, redness to back and knee caps  D/C DATE: Pending guardianship and placement  Discharge Barriers: Placement  OTHER IMPORTANT INFO: Make sure door alarm is on!

## 2021-04-25 NOTE — PLAN OF CARE
DATE & TIME: 4/25/21 8670-8604   Cognitive Concerns/ Orientation : Alert to self, pt sleep most of shift  BEHAVIOR & AGGRESSION TOOL COLOR: Green  CIWA SCORE: N/A  ABNL VS/O2: Daily vitals, RA  MOBILITY: Independent in room, when walking halls SBA  PAIN MANAGMENT: No complaints of pain  DIET: Reg with safe tray utensils  BOWEL/BLADDER: Continent  ABNL LAB/BG:   DRAIN/DEVICES: N/A  TELEMETRY RHYTHM: N/A  SKIN: pale and dry, redness to back and knee caps  TESTS/PROCEDURES: N/A  D/C DATE: pending guardianship and placement  Discharge Barriers: placement  OTHER IMPORTANT INFO: See additional progress note; Make sure Door alarm is on

## 2021-04-26 PROCEDURE — 250N000013 HC RX MED GY IP 250 OP 250 PS 637: Performed by: INTERNAL MEDICINE

## 2021-04-26 PROCEDURE — 250N000013 HC RX MED GY IP 250 OP 250 PS 637: Performed by: PSYCHIATRY & NEUROLOGY

## 2021-04-26 PROCEDURE — 99231 SBSQ HOSP IP/OBS SF/LOW 25: CPT | Performed by: INTERNAL MEDICINE

## 2021-04-26 PROCEDURE — 120N000001 HC R&B MED SURG/OB

## 2021-04-26 PROCEDURE — 250N000013 HC RX MED GY IP 250 OP 250 PS 637: Performed by: HOSPITALIST

## 2021-04-26 RX ADMIN — LORAZEPAM 1 MG: 1 TABLET ORAL at 10:17

## 2021-04-26 RX ADMIN — HALOPERIDOL 5 MG: 5 TABLET ORAL at 21:50

## 2021-04-26 RX ADMIN — Medication 800 UNITS: at 08:14

## 2021-04-26 RX ADMIN — MEMANTINE 5 MG: 5 TABLET ORAL at 08:14

## 2021-04-26 RX ADMIN — DOCUSATE SODIUM 100 MG: 100 CAPSULE, LIQUID FILLED ORAL at 21:09

## 2021-04-26 RX ADMIN — DOCUSATE SODIUM 100 MG: 100 CAPSULE, LIQUID FILLED ORAL at 08:14

## 2021-04-26 RX ADMIN — BENZTROPINE MESYLATE 1 MG: 0.5 TABLET ORAL at 08:14

## 2021-04-26 RX ADMIN — BENZTROPINE MESYLATE 1 MG: 0.5 TABLET ORAL at 21:09

## 2021-04-26 RX ADMIN — SALMETEROL XINAFOATE 1 PUFF: 50 POWDER, METERED ORAL; RESPIRATORY (INHALATION) at 21:09

## 2021-04-26 RX ADMIN — SALMETEROL XINAFOATE 1 PUFF: 50 POWDER, METERED ORAL; RESPIRATORY (INHALATION) at 10:17

## 2021-04-26 RX ADMIN — HALOPERIDOL 10 MG: 5 TABLET ORAL at 21:09

## 2021-04-26 RX ADMIN — ATORVASTATIN CALCIUM 80 MG: 40 TABLET, FILM COATED ORAL at 21:09

## 2021-04-26 RX ADMIN — VENLAFAXINE HYDROCHLORIDE 75 MG: 75 CAPSULE, EXTENDED RELEASE ORAL at 08:14

## 2021-04-26 RX ADMIN — LEVOTHYROXINE SODIUM 88 MCG: 88 TABLET ORAL at 08:14

## 2021-04-26 RX ADMIN — OLANZAPINE 10 MG: 5 TABLET, ORALLY DISINTEGRATING ORAL at 15:07

## 2021-04-26 RX ADMIN — ASPIRIN 81 MG: 81 TABLET, COATED ORAL at 08:14

## 2021-04-26 RX ADMIN — HALOPERIDOL 10 MG: 5 TABLET ORAL at 15:05

## 2021-04-26 RX ADMIN — HALOPERIDOL 10 MG: 5 TABLET ORAL at 08:14

## 2021-04-26 ASSESSMENT — ACTIVITIES OF DAILY LIVING (ADL)
ADLS_ACUITY_SCORE: 13

## 2021-04-26 NOTE — PLAN OF CARE
Cognitive Concerns/ Orientation : Alert & oriented to self only. Slept most of the night. Redirectable back to his room when he comes out. No hallucinations reported overnight.  BEHAVIOR & AGGRESSION TOOL COLOR: Yellow  CIWA SCORE: NA   ABNL VS/O2: VSS on RA   MOBILITY: independent in room, SBA in halls  PAIN MANAGMENT: denies pain  DIET: Regular, tolerating well   BOWEL/BLADDER: BS active x 4; continent, up to BR   ABNL LAB/BG:N/A  DRAIN/DEVICES: NA  TELEMETRY RHYTHM: NA  SKIN: pale, dry, redness to back and knee caps, intact   TESTS/PROCEDURES: NA  D/C DATE: pending   Discharge Barriers: placement and guardianship   OTHER IMPORTANT INFO: door alarm in place

## 2021-04-26 NOTE — PLAN OF CARE
"DATE & TIME: 4/25/2021; 9017-3794  Cognitive Concerns/ Orientation : A & O x 1 (self only), calm most of shift, frequent audible and visual hallucinations, pt. Reported hearing \"voices\" at times  BEHAVIOR & AGGRESSION TOOL COLOR: Yellow, zyprexa given x 1 for increased agitation around 1930, given with effect  CIWA SCORE: NA   ABNL VS/O2: VSS on RA   MOBILITY: independent in room, SBA in halls, frequently ambulating in the halls with staff   PAIN MANAGMENT: denies   DIET: Regular, tolerating well   BOWEL/BLADDER: BS active x 4; continent, up to BR   ABNL LAB/BG: No new labs   DRAIN/DEVICES: NA  TELEMETRY RHYTHM: NA  SKIN: pale, dry, redness to back and knee caps, intact   TESTS/PROCEDURES: NA  D/C DATE: pending   Discharge Barriers: placement and guardianship   OTHER IMPORTANT INFO: door alarm in place  "

## 2021-04-26 NOTE — PLAN OF CARE
Cognitive Concerns/ Orientation : A&O to self, anxious, restless, repeatedly requesting to go out and smoke and that he can leave because he's not on a commitment. Hallucinations present-looking up to the right ceiling listening intently and talking to someone who is not there. Stated that they're friends named Marco  BEHAVIOR & AGGRESSION TOOL COLOR: Yellow  CIWA SCORE: NA   ABNL VS/O2: VSS on room air  MOBILITY: independent in room, SBA in halls with staff   PAIN MANAGMENT: denies pain  DIET: Regular, tolerating well   BOWEL/BLADDER: continent  SKIN: pale, dry, redness to back and knee caps-showered today  Discharge Barriers: placement and guardianship   OTHER IMPORTANT INFO: door alarm in place. Sitter present. PRN zyprexa given once.

## 2021-04-26 NOTE — PROGRESS NOTES
[facility]    Medicine Progress Note - Hospitalist Service       Date of Admission:  9/9/2020  Date of Service: 04/26/2021    Assessment & Plan          John Juárez is a 55 year old male with PMHx of schizophrenia, hx of TBI, alcohol use disorder, COPD, hyperlipidemia and hypothyroidism who was admitted on 9/9/2020 for evaluation of aggressive behavior at his group home.       Was evaluated by Psychiatry and his condition stabilized, though his group home was unwilling to take him back and thus hospitalization has been prolonged awaiting new placement and guardianship. Under civil commitment through [facility]     Neurocognitive disorder dt hx of TBI and alcohol use disorder  Schizophrenia  Under commitment  Had been residing in group home prior to admission.  Brought to hospital for evaluation of aggressive behaviors. Group home now unwilling to take him back. Has had numerous CODE 21s called this stay. Seen by psych, meds adjusted. Is currently under civil commitment and court hold through [facility] until 7/31/21.   --Continue on Haldol 10mg TID (timed for when patient tends to be most agitated), memantine 5mg daily, venlafaxine 75mg daily, benztropine 1mg BID   -- prns available for agitation (including Haldol, Zyprexa and Ativan)  -- SW following for placement, placement will occur once guardianship is established     Possible Tardive Dyskinesia vs EPSE  Per psych assessment on 4/1/21, noted to have possible tardive dyskinesia vs extrapyramidal side effects of meds. At that time, recommended to increase Cogentin to 1mg BID and add vitamin E 800 international unit(s) daily.      Back cellulitis in 1/2021: Resolved  Noted on 1/24/21, initiated on cephalexin at that time. Received local wound care and completed 7-day course of antibiotics on 1/30     Baseline Hypotension  BP 90-110s systolic on average. Asymptomatic. No concerns     Hyperlipidemia  Chronic and stable on  llow dose ASA and  atorvastatin     COPD  Not O2 dependent. Chronic and stable on salmeterol, albuterol prn     Hypothyroidism  Chronic and stable on levothyroxine     Tobacco use disorder  Using nicotine patch and PRN gum     Resting tremor of upper extremities  No acute issues           Diet: Room Service  Diet  Combination Diet Regular Diet Adult; Safe Tray - with utensils  Snacks/Supplements Adult: Other; Bedtime snack; Between Meals  Room Service    DVT Prophylaxis: Pneumatic Compression Devices  Valentin Catheter: not present  Code Status: Full Code           Disposition Plan   Expected discharge: when placement found        Deuce Turner MD  Hospitalist Service  [facility]  Contact information available via [facility] Paging/Directory    ______________________________________________________________________    Interval History    No complaints   Physical Exam   Vital Signs: Temp: 97.3  F (36.3  C) Temp src: Oral BP: 96/61 Pulse: 82   Resp: 18 SpO2: 95 % O2 Device: None (Room air)    Weight: 185 lbs 9.6 oz    Exam:   Lying in bed  Cardiovascular: RRR.  No  Murmurs.  Respiratory: clear  Gastrointestinal: Abdomen soft, non-tender. BS normal.    Data   No lab results found in last 7 days.    No results found for this or any previous visit (from the past 24 hour(s)).  Medications       aspirin  81 mg Oral Daily     atorvastatin  80 mg Oral At Bedtime     benztropine  1 mg Oral BID     docusate sodium  100 mg Oral BID     haloperidol  10 mg Oral TID     levothyroxine  88 mcg Oral Daily     memantine  5 mg Oral Daily     salmeterol  1 puff Inhalation BID     venlafaxine  75 mg Oral Daily with breakfast     vitamin E  800 Units Oral Daily

## 2021-04-27 PROCEDURE — 250N000013 HC RX MED GY IP 250 OP 250 PS 637: Performed by: INTERNAL MEDICINE

## 2021-04-27 PROCEDURE — 250N000013 HC RX MED GY IP 250 OP 250 PS 637: Performed by: HOSPITALIST

## 2021-04-27 PROCEDURE — 99231 SBSQ HOSP IP/OBS SF/LOW 25: CPT | Performed by: INTERNAL MEDICINE

## 2021-04-27 PROCEDURE — 120N000001 HC R&B MED SURG/OB

## 2021-04-27 PROCEDURE — 250N000013 HC RX MED GY IP 250 OP 250 PS 637: Performed by: PSYCHIATRY & NEUROLOGY

## 2021-04-27 RX ADMIN — HALOPERIDOL 10 MG: 5 TABLET ORAL at 14:10

## 2021-04-27 RX ADMIN — OLANZAPINE 10 MG: 5 TABLET, ORALLY DISINTEGRATING ORAL at 13:33

## 2021-04-27 RX ADMIN — LEVOTHYROXINE SODIUM 88 MCG: 88 TABLET ORAL at 10:01

## 2021-04-27 RX ADMIN — HALOPERIDOL 10 MG: 5 TABLET ORAL at 20:33

## 2021-04-27 RX ADMIN — BENZTROPINE MESYLATE 1 MG: 0.5 TABLET ORAL at 20:33

## 2021-04-27 RX ADMIN — SALMETEROL XINAFOATE 1 PUFF: 50 POWDER, METERED ORAL; RESPIRATORY (INHALATION) at 20:32

## 2021-04-27 RX ADMIN — ATORVASTATIN CALCIUM 80 MG: 40 TABLET, FILM COATED ORAL at 20:32

## 2021-04-27 RX ADMIN — Medication 800 UNITS: at 10:00

## 2021-04-27 RX ADMIN — MEMANTINE 5 MG: 5 TABLET ORAL at 10:01

## 2021-04-27 RX ADMIN — DOCUSATE SODIUM 100 MG: 100 CAPSULE, LIQUID FILLED ORAL at 10:01

## 2021-04-27 RX ADMIN — ASPIRIN 81 MG: 81 TABLET, COATED ORAL at 10:01

## 2021-04-27 RX ADMIN — HALOPERIDOL 10 MG: 5 TABLET ORAL at 10:01

## 2021-04-27 RX ADMIN — DOCUSATE SODIUM 100 MG: 100 CAPSULE, LIQUID FILLED ORAL at 20:33

## 2021-04-27 RX ADMIN — SALMETEROL XINAFOATE 1 PUFF: 50 POWDER, METERED ORAL; RESPIRATORY (INHALATION) at 10:05

## 2021-04-27 RX ADMIN — VENLAFAXINE HYDROCHLORIDE 75 MG: 75 CAPSULE, EXTENDED RELEASE ORAL at 10:01

## 2021-04-27 RX ADMIN — BENZTROPINE MESYLATE 1 MG: 0.5 TABLET ORAL at 10:01

## 2021-04-27 RX ADMIN — LORAZEPAM 1 MG: 1 TABLET ORAL at 15:41

## 2021-04-27 ASSESSMENT — ACTIVITIES OF DAILY LIVING (ADL)
ADLS_ACUITY_SCORE: 13

## 2021-04-27 NOTE — PLAN OF CARE
"DATE & TIME: 4/27/21 5428-2881  Cognitive Concerns/ Orientation : A&O to self, anxious, restless, repeatedly requesting to go smoke and leave hospital. Patient slamming doors, frequent comments about, \"commitment\", throwing objects at wall in room, verbally abusive towards staff stating \"bitch\" prior to slamming doors. PO zyprexa given x1 , redirection and de-escalation techniques utilized. Walks offered/encouraged  BEHAVIOR & AGGRESSION TOOL COLOR: Yellow  CIWA SCORE: NA   ABNL VS/O2: VSS  MOBILITY: independent in room, SBA in halls with staff, ambulating frequently with staff in halls this shift.  PAIN MANAGMENT: denies pain  DIET: Regular, tolerating well   BOWEL/BLADDER: continent  SKIN: pale, dry, redness to back and knee caps  Discharge Barriers: placement and guardianship   OTHER IMPORTANT INFO: door alarm in place. Sitter present. Agitated this shift.   "

## 2021-04-27 NOTE — PROGRESS NOTES
[facility]    Medicine Progress Note - Hospitalist Service       Date of Admission:  9/9/2020  Date of Service: 04/27/2021    Assessment & Plan          John Juárez is a 55 year old male with PMHx of schizophrenia, hx of TBI, alcohol use disorder, COPD, hyperlipidemia and hypothyroidism who was admitted on 9/9/2020 for evaluation of aggressive behavior at his group home.       Was evaluated by Psychiatry and his condition stabilized, though his group home was unwilling to take him back and thus hospitalization has been prolonged awaiting new placement and guardianship. Under civil commitment through [facility]     Neurocognitive disorder dt hx of TBI and alcohol use disorder  Schizophrenia  Under commitment  Had been residing in group home prior to admission.  Brought to hospital for evaluation of aggressive behaviors. Group home now unwilling to take him back. Has had numerous CODE 21s called this stay. Seen by psych, meds adjusted. Is currently under civil commitment and court hold through [facility] until 7/31/21.   --Continue on Haldol 10mg TID (timed for when patient tends to be most agitated), memantine 5mg daily, venlafaxine 75mg daily, benztropine 1mg BID   -- prns available for agitation (including Haldol, Zyprexa and Ativan)  -- SW following for placement, placement will occur once guardianship is established     Possible Tardive Dyskinesia vs EPSE  Per psych assessment on 4/1/21, noted to have possible tardive dyskinesia vs extrapyramidal side effects of meds. At that time, recommended to increase Cogentin to 1mg BID and add vitamin E 800 international unit(s) daily.      Back cellulitis in 1/2021: Resolved  Noted on 1/24/21, initiated on cephalexin at that time. Received local wound care and completed 7-day course of antibiotics on 1/30     Baseline Hypotension  BP 90-110s systolic on average. Asymptomatic. No concerns     Hyperlipidemia  Chronic and stable on  llow dose ASA and  atorvastatin     COPD  Not O2 dependent. Chronic and stable on salmeterol, albuterol prn     Hypothyroidism  Chronic and stable on levothyroxine     Tobacco use disorder  Using nicotine patch and PRN gum     Resting tremor of upper extremities  No acute issues           Diet: Room Service  Diet  Combination Diet Regular Diet Adult; Safe Tray - with utensils  Snacks/Supplements Adult: Other; Bedtime snack; Between Meals  Room Service    DVT Prophylaxis: Pneumatic Compression Devices  Valentin Catheter: not present  Code Status: Full Code           Disposition Plan   Expected discharge: when placement found        Deuce Turner MD  Hospitalist Service  [facility]  Contact information available via [facility] Paging/Directory    ______________________________________________________________________    Interval History    No complaints   Physical Exam   Vital Signs: Temp: 97.3  F (36.3  C) Temp src: Oral BP: 94/66 Pulse: 75   Resp: 18 SpO2: 99 % O2 Device: None (Room air)    Weight: 185 lbs 9.6 oz    Exam:   Lying in bed  Cardiovascular: RRR.  No  Murmurs.  Respiratory: clear  Gastrointestinal: Abdomen soft, non-tender. BS normal.    Data   No lab results found in last 7 days.    No results found for this or any previous visit (from the past 24 hour(s)).  Medications       aspirin  81 mg Oral Daily     atorvastatin  80 mg Oral At Bedtime     benztropine  1 mg Oral BID     docusate sodium  100 mg Oral BID     haloperidol  10 mg Oral TID     levothyroxine  88 mcg Oral Daily     memantine  5 mg Oral Daily     salmeterol  1 puff Inhalation BID     venlafaxine  75 mg Oral Daily with breakfast     vitamin E  800 Units Oral Daily

## 2021-04-27 NOTE — PLAN OF CARE
DATE & TIME: 4/26/21 1900-0730  Cognitive Concerns/ Orientation : A&O to self, anxious, restless, repeatedly requesting to go out and go to the movies. Hallucinations present-talking to people that are not there saying that they are coming to pick him up to go to the movies.  BEHAVIOR & AGGRESSION TOOL COLOR: Yellow  CIWA SCORE: NA   ABNL VS/O2: Refused vitals this shift  MOBILITY: independent in room, SBA in halls with staff   PAIN MANAGMENT: denies pain  DIET: Regular, tolerating well   BOWEL/BLADDER: continent  SKIN: pale, dry, redness to back and knee caps-showered today  Discharge Barriers: placement and guardianship   OTHER IMPORTANT INFO: door alarm in place. Sitter present. Slept well majority of night.

## 2021-04-28 PROCEDURE — 250N000013 HC RX MED GY IP 250 OP 250 PS 637: Performed by: PSYCHIATRY & NEUROLOGY

## 2021-04-28 PROCEDURE — 120N000001 HC R&B MED SURG/OB

## 2021-04-28 PROCEDURE — 250N000013 HC RX MED GY IP 250 OP 250 PS 637: Performed by: HOSPITALIST

## 2021-04-28 PROCEDURE — 250N000013 HC RX MED GY IP 250 OP 250 PS 637: Performed by: INTERNAL MEDICINE

## 2021-04-28 PROCEDURE — 99231 SBSQ HOSP IP/OBS SF/LOW 25: CPT | Performed by: INTERNAL MEDICINE

## 2021-04-28 RX ADMIN — SALMETEROL XINAFOATE 1 PUFF: 50 POWDER, METERED ORAL; RESPIRATORY (INHALATION) at 21:03

## 2021-04-28 RX ADMIN — Medication 800 UNITS: at 08:14

## 2021-04-28 RX ADMIN — DOCUSATE SODIUM 100 MG: 100 CAPSULE, LIQUID FILLED ORAL at 08:14

## 2021-04-28 RX ADMIN — LEVOTHYROXINE SODIUM 88 MCG: 88 TABLET ORAL at 08:14

## 2021-04-28 RX ADMIN — ATORVASTATIN CALCIUM 80 MG: 40 TABLET, FILM COATED ORAL at 21:03

## 2021-04-28 RX ADMIN — DOCUSATE SODIUM 100 MG: 100 CAPSULE, LIQUID FILLED ORAL at 21:03

## 2021-04-28 RX ADMIN — HALOPERIDOL 10 MG: 5 TABLET ORAL at 21:03

## 2021-04-28 RX ADMIN — SALMETEROL XINAFOATE 1 PUFF: 50 POWDER, METERED ORAL; RESPIRATORY (INHALATION) at 08:14

## 2021-04-28 RX ADMIN — MEMANTINE 5 MG: 5 TABLET ORAL at 08:14

## 2021-04-28 RX ADMIN — BENZTROPINE MESYLATE 1 MG: 0.5 TABLET ORAL at 08:14

## 2021-04-28 RX ADMIN — BENZTROPINE MESYLATE 1 MG: 0.5 TABLET ORAL at 21:03

## 2021-04-28 RX ADMIN — HALOPERIDOL 10 MG: 5 TABLET ORAL at 08:14

## 2021-04-28 RX ADMIN — VENLAFAXINE HYDROCHLORIDE 75 MG: 75 CAPSULE, EXTENDED RELEASE ORAL at 08:14

## 2021-04-28 RX ADMIN — ASPIRIN 81 MG: 81 TABLET, COATED ORAL at 08:14

## 2021-04-28 RX ADMIN — LORAZEPAM 1 MG: 1 TABLET ORAL at 13:06

## 2021-04-28 RX ADMIN — HALOPERIDOL 10 MG: 5 TABLET ORAL at 14:34

## 2021-04-28 ASSESSMENT — ACTIVITIES OF DAILY LIVING (ADL)
ADLS_ACUITY_SCORE: 13

## 2021-04-28 NOTE — PLAN OF CARE
DATE & TIME: 4/27/2021; 1398-7725  Cognitive Concerns/ Orientation : A & O x 1 (self only), anxious and angry at times, easily redirectable, ambulation, food, water offered frequently  BEHAVIOR & AGGRESSION TOOL COLOR: Yellow, angry and anxious at times, ativan given x 1 with some effect   CIWA SCORE: NA    ABNL VS/O2: VSS on RA, daily vitals   MOBILITY: independent in room, SBA in the halls, ambulated frequently   PAIN MANAGMENT: denies   DIET: Regular tolerating well   BOWEL/BLADDER: BS active x 4, continent   ABNL LAB/BG: no new labs   DRAIN/DEVICES: NA   TELEMETRY RHYTHM: NA  SKIN: pale, scattered bruising, redness to knee caps and back   TESTS/PROCEDURES: NA   D/C DATE: pending   Discharge Barriers: placement and guardianship   OTHER IMPORTANT INFO: door alarm in place

## 2021-04-28 NOTE — PLAN OF CARE
DATE & TIME: 4/27/2021; 0733-9494  Cognitive Concerns/ Orientation : A & O x 1 (self only), anxious and angry at times, easily redirectable, ambulation, food, water offered frequently. Slept well overnight.  BEHAVIOR & AGGRESSION TOOL COLOR: Yellow, angry and anxious at times  CIWA SCORE: NA    ABNL VS/O2: VSS on RA, daily vitals   MOBILITY: independent in room, SBA in the halls, ambulated frequently   PAIN MANAGMENT: denies   DIET: Regular tolerating well   BOWEL/BLADDER: BS active x 4, continent   ABNL LAB/BG: no new labs   DRAIN/DEVICES: NA   TELEMETRY RHYTHM: NA  SKIN: pale, scattered bruising, redness to knee caps and back   TESTS/PROCEDURES: NA   D/C DATE: pending   Discharge Barriers: placement and guardianship   OTHER IMPORTANT INFO: door alarm in place

## 2021-04-28 NOTE — PLAN OF CARE
Cognitive Concerns/ Orientation : A&Ox1 (self only), anxious and frustrated and swearing at times-ativan given at 1305. Hitting his fist into his hand while staring at the wall, stated he's hearing voices. Offered a walk in the halls  BEHAVIOR & AGGRESSION TOOL COLOR: Yellow, angry and anxious at times  CIWA SCORE: NA    ABNL VS/O2: VSS on room air  MOBILITY: independent in room, SBA in the halls, ambulated frequently   PAIN MANAGMENT: denies pain  DIET: Regular -good appetite  BOWEL/BLADDER: Continent   ABNL LAB/BG: no new labs   DRAIN/DEVICES: NA   TELEMETRY RHYTHM: NA  SKIN: dry and flaky  TESTS/PROCEDURES: NA   D/C DATE: pending   Discharge Barriers: placement and guardianship   OTHER IMPORTANT INFO: door alarm in place. Sitter available at door. Showered today.

## 2021-04-28 NOTE — PROGRESS NOTES
[facility]    Medicine Progress Note - Hospitalist Service       Date of Admission:  9/9/2020  Date of Service: 04/28/2021    Assessment & Plan          John Juárez is a 55 year old male with PMHx of schizophrenia, hx of TBI, alcohol use disorder, COPD, hyperlipidemia and hypothyroidism who was admitted on 9/9/2020 for evaluation of aggressive behavior at his group home.       Was evaluated by Psychiatry and his condition stabilized, though his group home was unwilling to take him back and thus hospitalization has been prolonged awaiting new placement and guardianship. Under civil commitment through [facility]     Neurocognitive disorder dt hx of TBI and alcohol use disorder  Schizophrenia  Under commitment  Had been residing in group home prior to admission.  Brought to hospital for evaluation of aggressive behaviors. Group home now unwilling to take him back. Has had numerous CODE 21s called this stay. Seen by psych, meds adjusted. Is currently under civil commitment and court hold through [facility] until 7/31/21.   --Continue on Haldol 10mg TID (timed for when patient tends to be most agitated), memantine 5mg daily, venlafaxine 75mg daily, benztropine 1mg BID   -- prns available for agitation (including Haldol, Zyprexa and Ativan)  -- SW following for placement, placement will occur once guardianship is established     Possible Tardive Dyskinesia vs EPSE  Per psych assessment on 4/1/21, noted to have possible tardive dyskinesia vs extrapyramidal side effects of meds. At that time, recommended to increase Cogentin to 1mg BID and add vitamin E 800 international unit(s) daily.      Back cellulitis in 1/2021: Resolved  Noted on 1/24/21, initiated on cephalexin at that time. Received local wound care and completed 7-day course of antibiotics on 1/30     Baseline Hypotension  BP 90-110s systolic on average. Asymptomatic. No concerns     Hyperlipidemia  Chronic and stable on  llow dose ASA and  atorvastatin     COPD  Not O2 dependent. Chronic and stable on salmeterol, albuterol prn     Hypothyroidism  Chronic and stable on levothyroxine     Tobacco use disorder  Using nicotine patch and PRN gum     Resting tremor of upper extremities  No acute issues           Diet: Room Service  Diet  Combination Diet Regular Diet Adult; Safe Tray - with utensils  Snacks/Supplements Adult: Other; Bedtime snack; Between Meals  Room Service    DVT Prophylaxis: Pneumatic Compression Devices  Valentin Catheter: not present  Code Status: Full Code           Disposition Plan   Expected discharge: when placement found        Deuce Turner MD  Hospitalist Service  [facility]  Contact information available via [facility] Paging/Directory    ______________________________________________________________________    Interval History    No complaints   Physical Exam   Vital Signs: Temp: 97.5  F (36.4  C) Temp src: Oral BP: (!) 127/92(up from sleeping) Pulse: 93   Resp: 18 SpO2: 97 % O2 Device: None (Room air)    Weight: 185 lbs 9.6 oz    Exam:   Lying in bed  Cardiovascular: RRR.  No  Murmurs.  Respiratory: clear  Gastrointestinal: Abdomen soft, non-tender. BS normal.    Data   No lab results found in last 7 days.    No results found for this or any previous visit (from the past 24 hour(s)).  Medications       aspirin  81 mg Oral Daily     atorvastatin  80 mg Oral At Bedtime     benztropine  1 mg Oral BID     docusate sodium  100 mg Oral BID     haloperidol  10 mg Oral TID     levothyroxine  88 mcg Oral Daily     memantine  5 mg Oral Daily     salmeterol  1 puff Inhalation BID     venlafaxine  75 mg Oral Daily with breakfast     vitamin E  800 Units Oral Daily

## 2021-04-29 PROCEDURE — 250N000013 HC RX MED GY IP 250 OP 250 PS 637: Performed by: INTERNAL MEDICINE

## 2021-04-29 PROCEDURE — 99231 SBSQ HOSP IP/OBS SF/LOW 25: CPT | Performed by: INTERNAL MEDICINE

## 2021-04-29 PROCEDURE — 250N000013 HC RX MED GY IP 250 OP 250 PS 637: Performed by: HOSPITALIST

## 2021-04-29 PROCEDURE — 250N000013 HC RX MED GY IP 250 OP 250 PS 637: Performed by: PSYCHIATRY & NEUROLOGY

## 2021-04-29 PROCEDURE — 120N000001 HC R&B MED SURG/OB

## 2021-04-29 RX ORDER — LORAZEPAM 1 MG/1
2 TABLET ORAL 3 TIMES DAILY PRN
Status: DISCONTINUED | OUTPATIENT
Start: 2021-04-29 | End: 2021-05-17

## 2021-04-29 RX ADMIN — VENLAFAXINE HYDROCHLORIDE 75 MG: 75 CAPSULE, EXTENDED RELEASE ORAL at 07:44

## 2021-04-29 RX ADMIN — DOCUSATE SODIUM 100 MG: 100 CAPSULE, LIQUID FILLED ORAL at 20:34

## 2021-04-29 RX ADMIN — OLANZAPINE 10 MG: 5 TABLET, ORALLY DISINTEGRATING ORAL at 13:30

## 2021-04-29 RX ADMIN — OLANZAPINE 10 MG: 5 TABLET, ORALLY DISINTEGRATING ORAL at 03:27

## 2021-04-29 RX ADMIN — SALMETEROL XINAFOATE 1 PUFF: 50 POWDER, METERED ORAL; RESPIRATORY (INHALATION) at 07:50

## 2021-04-29 RX ADMIN — BENZTROPINE MESYLATE 1 MG: 0.5 TABLET ORAL at 20:34

## 2021-04-29 RX ADMIN — BENZTROPINE MESYLATE 1 MG: 0.5 TABLET ORAL at 07:49

## 2021-04-29 RX ADMIN — ASPIRIN 81 MG: 81 TABLET, COATED ORAL at 07:44

## 2021-04-29 RX ADMIN — Medication 800 UNITS: at 07:44

## 2021-04-29 RX ADMIN — LEVOTHYROXINE SODIUM 88 MCG: 88 TABLET ORAL at 07:44

## 2021-04-29 RX ADMIN — HALOPERIDOL 10 MG: 5 TABLET ORAL at 14:33

## 2021-04-29 RX ADMIN — MEMANTINE 5 MG: 5 TABLET ORAL at 07:44

## 2021-04-29 RX ADMIN — ATORVASTATIN CALCIUM 80 MG: 40 TABLET, FILM COATED ORAL at 20:34

## 2021-04-29 RX ADMIN — HALOPERIDOL 10 MG: 5 TABLET ORAL at 20:35

## 2021-04-29 RX ADMIN — LORAZEPAM 1 MG: 1 TABLET ORAL at 16:02

## 2021-04-29 RX ADMIN — DOCUSATE SODIUM 100 MG: 100 CAPSULE, LIQUID FILLED ORAL at 07:45

## 2021-04-29 RX ADMIN — HALOPERIDOL 10 MG: 5 TABLET ORAL at 07:45

## 2021-04-29 ASSESSMENT — ACTIVITIES OF DAILY LIVING (ADL)
ADLS_ACUITY_SCORE: 13

## 2021-04-29 NOTE — PROVIDER NOTIFICATION
Paged Dr Barnes regarding patient becoming more agitated with staff and increased visual and auditory hallucinations, hitting his fit into his hand. Yelling at the voices in his room. stating he's seeing people that are not there in his room. Please reconsult shane.

## 2021-04-29 NOTE — PLAN OF CARE
"Cognitive Concerns/ Orientation: A&Ox1, audible/visual hallucinations at times, anxious at times. Agitated about why he's in the hospital. Slamming his door and swearing due to frustration-prn zyprexa given. Gave scheduled haldol early as pt became agitated, swearing, slamming door, telling staff to get the f..k out of his room. Hitting his fist in his hand. Angry about commitment and stated \"I've been her for 10 month\".  Acknowledged his feelings, offered support.  BEHAVIOR & AGGRESSION TOOL COLOR: Green   CIWA SCORE: NA    ABNL VS/O2: VSS on RA   MOBILITY: sba in halls, ind in room  PAIN MANAGMENT: denies pain  DIET: Regular-tolerating  BOWEL/BLADDER: Continent   SKIN: pale, intact   TESTS/PROCEDURES: NA   D/C DATE: pending discharge   Discharge Barriers: placement and guardianship  OTHER IMPORTANT INFO: Door alarm in place. Sitter is at the bedside. Distracting is becoming difficult as patient isn't able to be as easily redirected when agitated due to limited activities to assist in distraction. Frequently ambulated in the halls  "

## 2021-04-29 NOTE — PLAN OF CARE
DATE & TIME: 4/28/2021; 9748-3784    Cognitive Concerns/ Orientation : A & O x 1 (self only), audible/visual hallucinations at times, pleasant and calm most of shift  BEHAVIOR & AGGRESSION TOOL COLOR: Green   CIWA SCORE: NA    ABNL VS/O2: VSS on RA   MOBILITY: independent in room, SBA in halls, encouraged frequent ambulation   PAIN MANAGMENT: denies   DIET: Regular, tolerating well, encouraged water   BOWEL/BLADDER: BS active x 4, continent   ABNL LAB/BG: no new labs  DRAIN/DEVICES: PIV/SL   TELEMETRY RHYTHM: NA   SKIN: pale, intact   TESTS/PROCEDURES: NA   D/C DATE: pending discharge   Discharge Barriers: placement and guardianship   OTHER IMPORTANT INFO: door alarm in place

## 2021-04-29 NOTE — PROGRESS NOTES
[facility]    Medicine Progress Note - Hospitalist Service       Date of Admission:  9/9/2020  Date of Service: 04/29/2021    Assessment & Plan          John Juárez is a 55 year old male with PMHx of schizophrenia, hx of TBI, alcohol use disorder, COPD, hyperlipidemia and hypothyroidism who was admitted on 9/9/2020 for evaluation of aggressive behavior at his group home.       Was evaluated by Psychiatry and his condition stabilized, though his group home was unwilling to take him back and thus hospitalization has been prolonged awaiting new placement and guardianship. Under civil commitment through [facility]     Neurocognitive disorder dt hx of TBI and alcohol use disorder  Schizophrenia  Under commitment  Had been residing in group home prior to admission.  Brought to hospital for evaluation of aggressive behaviors. Group home now unwilling to take him back. Has had numerous CODE 21s called this stay. Seen by psych, meds adjusted. Is currently under civil commitment and court hold through [facility] until 7/31/21.   --Continue on Haldol 10mg TID (timed for when patient tends to be most agitated), memantine 5mg daily, venlafaxine 75mg daily, benztropine 1mg BID   -- prns available for agitation (including Haldol, Zyprexa and Ativan)  -- SW following for placement, placement will occur once guardianship is established     Possible Tardive Dyskinesia vs EPSE  Per psych assessment on 4/1/21, noted to have possible tardive dyskinesia vs extrapyramidal side effects of meds. At that time, recommended to increase Cogentin to 1mg BID and add vitamin E 800 international unit(s) daily.      Back cellulitis in 1/2021: Resolved  Noted on 1/24/21, initiated on cephalexin at that time. Received local wound care and completed 7-day course of antibiotics on 1/30     Baseline Hypotension  BP 90-110s systolic on average. Asymptomatic. No concerns     Hyperlipidemia  Chronic and stable on  llow dose ASA and  atorvastatin     COPD  Not O2 dependent. Chronic and stable on salmeterol, albuterol prn     Hypothyroidism  Chronic and stable on levothyroxine     Tobacco use disorder  Using nicotine patch and PRN gum     Resting tremor of upper extremities  No acute issues           Diet: Room Service  Diet  Combination Diet Regular Diet Adult; Safe Tray - with utensils  Snacks/Supplements Adult: Other; Bedtime snack; Between Meals  Room Service    DVT Prophylaxis: Pneumatic Compression Devices  Valentin Catheter: not present  Code Status: Full Code           Disposition Plan   Expected discharge: when placement found        Deuce Turner MD  Hospitalist Service  [facility]  Contact information available via [facility] Paging/Directory    ______________________________________________________________________    Interval History    No complaints   Physical Exam   Vital Signs: Temp: 97.6  F (36.4  C) Temp src: Oral BP: 117/82 Pulse: 92   Resp: 16 SpO2: 95 % O2 Device: None (Room air)    Weight: 185 lbs 9.6 oz    Exam:   Lying in bed  Cardiovascular: RRR.  No  Murmurs.  Respiratory: clear  Gastrointestinal: Abdomen soft, non-tender. BS normal.    Data   No lab results found in last 7 days.    No results found for this or any previous visit (from the past 24 hour(s)).  Medications       aspirin  81 mg Oral Daily     atorvastatin  80 mg Oral At Bedtime     benztropine  1 mg Oral BID     docusate sodium  100 mg Oral BID     haloperidol  10 mg Oral TID     levothyroxine  88 mcg Oral Daily     memantine  5 mg Oral Daily     salmeterol  1 puff Inhalation BID     venlafaxine  75 mg Oral Daily with breakfast     vitamin E  800 Units Oral Daily

## 2021-04-29 NOTE — PLAN OF CARE
DATE & TIME: 4/28/2021; 8869-1194    Cognitive Concerns/ Orientation : A & O x 1 (self only), audible/visual hallucinations at times, pleasant and calm most of shift  BEHAVIOR & AGGRESSION TOOL COLOR: Green   CIWA SCORE: NA    ABNL VS/O2: VSS on RA   MOBILITY: independent in room, SBA in halls, encouraged frequent ambulation   PAIN MANAGMENT: denies   DIET: Regular, tolerating well, encouraged water   BOWEL/BLADDER: BS active x 4, continent   ABNL LAB/BG: no new labs  DRAIN/DEVICES: PIV/SL   TELEMETRY RHYTHM: NA   SKIN: pale, intact   TESTS/PROCEDURES: NA   D/C DATE: pending discharge   Discharge Barriers: placement and guardianship   OTHER IMPORTANT INFO: door alarm in place

## 2021-04-29 NOTE — PROGRESS NOTES
MD Notification    Notified Person: MD    Notified Person Name: Dr. Barnes     Notification Date/Time: 4/29/2021; 1611     Notification Interaction: web-based paging     Purpose of Notification: Hello, pt. very agitated and aggressive. Hearing voices and wanting to leave. Security called. PO zyprexa and ativan recently given. Any further recommendations?     Orders Received:  -Ativan increased to 1-2 mg q3h PRN    Comments:

## 2021-04-30 PROCEDURE — 250N000013 HC RX MED GY IP 250 OP 250 PS 637: Performed by: HOSPITALIST

## 2021-04-30 PROCEDURE — 99232 SBSQ HOSP IP/OBS MODERATE 35: CPT | Performed by: INTERNAL MEDICINE

## 2021-04-30 PROCEDURE — 120N000001 HC R&B MED SURG/OB

## 2021-04-30 PROCEDURE — 250N000013 HC RX MED GY IP 250 OP 250 PS 637: Performed by: PSYCHIATRY & NEUROLOGY

## 2021-04-30 PROCEDURE — 99232 SBSQ HOSP IP/OBS MODERATE 35: CPT | Performed by: PSYCHIATRY & NEUROLOGY

## 2021-04-30 PROCEDURE — 250N000013 HC RX MED GY IP 250 OP 250 PS 637: Performed by: INTERNAL MEDICINE

## 2021-04-30 RX ADMIN — DOCUSATE SODIUM 100 MG: 100 CAPSULE, LIQUID FILLED ORAL at 20:53

## 2021-04-30 RX ADMIN — BENZTROPINE MESYLATE 1 MG: 0.5 TABLET ORAL at 09:41

## 2021-04-30 RX ADMIN — HALOPERIDOL 10 MG: 5 TABLET ORAL at 14:44

## 2021-04-30 RX ADMIN — Medication 800 UNITS: at 09:40

## 2021-04-30 RX ADMIN — ATORVASTATIN CALCIUM 80 MG: 40 TABLET, FILM COATED ORAL at 20:50

## 2021-04-30 RX ADMIN — LORAZEPAM 2 MG: 1 TABLET ORAL at 11:55

## 2021-04-30 RX ADMIN — ASPIRIN 81 MG: 81 TABLET, COATED ORAL at 09:40

## 2021-04-30 RX ADMIN — LEVOTHYROXINE SODIUM 88 MCG: 88 TABLET ORAL at 09:40

## 2021-04-30 RX ADMIN — HALOPERIDOL 10 MG: 5 TABLET ORAL at 09:40

## 2021-04-30 RX ADMIN — VENLAFAXINE HYDROCHLORIDE 75 MG: 75 CAPSULE, EXTENDED RELEASE ORAL at 09:41

## 2021-04-30 RX ADMIN — BENZTROPINE MESYLATE 1 MG: 0.5 TABLET ORAL at 20:50

## 2021-04-30 RX ADMIN — HALOPERIDOL 10 MG: 5 TABLET ORAL at 20:50

## 2021-04-30 RX ADMIN — MEMANTINE 5 MG: 5 TABLET ORAL at 09:40

## 2021-04-30 ASSESSMENT — ACTIVITIES OF DAILY LIVING (ADL)
ADLS_ACUITY_SCORE: 13

## 2021-04-30 NOTE — PROGRESS NOTES
[facility]    Medicine Progress Note - Hospitalist Service       Date of Admission:  9/9/2020  Date of Service: 04/30/2021    Assessment & Plan          John Juárez is a 55 year old male with PMHx of schizophrenia, hx of TBI, alcohol use disorder, COPD, hyperlipidemia and hypothyroidism who was admitted on 9/9/2020 for evaluation of aggressive behavior at his group home.       Was evaluated by Psychiatry and his condition stabilized, though his group home was unwilling to take him back and thus hospitalization has been prolonged awaiting new placement and guardianship. Under civil commitment through [facility]     Neurocognitive disorder dt hx of TBI and alcohol use disorder  Schizophrenia  Under commitment  Had been residing in group home prior to admission.  Brought to hospital for evaluation of aggressive behaviors. Group home now unwilling to take him back. Has had numerous CODE 21s called this stay. Seen by psych, meds adjusted. Is currently under civil commitment and court hold through [facility] until 7/31/21.   --Continue on Haldol 10mg TID (timed for when patient tends to be most agitated), memantine 5mg daily, venlafaxine 75mg daily, benztropine 1mg BID   -- prns available for agitation (including Haldol, Zyprexa and Ativan)  -- SW following for placement, placement will occur once guardianship is established  -- increased agitation last evening, increased PRN's provided.      Possible Tardive Dyskinesia vs EPSE  Per psych assessment on 4/1/21, noted to have possible tardive dyskinesia vs extrapyramidal side effects of meds. At that time, recommended to increase Cogentin to 1mg BID and add vitamin E 800 international unit(s) daily.      Back cellulitis in 1/2021: Resolved  Noted on 1/24/21, initiated on cephalexin at that time. Received local wound care and completed 7-day course of antibiotics on 1/30     Baseline Hypotension  BP 90-110s systolic on average.  Asymptomatic. No concerns     Hyperlipidemia  Chronic and stable on llow dose ASA and  atorvastatin     COPD  Not O2 dependent. Chronic and stable on salmeterol, albuterol prn     Hypothyroidism  Chronic and stable on levothyroxine     Tobacco use disorder  Using nicotine patch and PRN gum     Resting tremor of upper extremities  No acute issues           Diet: Room Service  Diet  Combination Diet Regular Diet Adult; Safe Tray - with utensils  Snacks/Supplements Adult: Other; Bedtime snack; Between Meals  Room Service    DVT Prophylaxis: Pneumatic Compression Devices  Valentin Catheter: not present  Code Status: Full Code           Disposition Plan   Expected discharge: when placement found        Deuce Turner MD  Hospitalist Service  [facility]  Contact information available via [facility] Paging/Directory    ______________________________________________________________________    Interval History    Increased agitation yesterday afternoon, slamming door, more aggressive -- did agree to take oral medication, calmer after Ativan.     Physical Exam   Vital Signs: Temp: 97.7  F (36.5  C) Temp src: Oral BP: 113/76 Pulse: 88   Resp: 18 SpO2: 99 % O2 Device: None (Room air)    Weight: 185 lbs 9.6 oz    Exam:   Lying in bed  Cardiovascular: RRR.  No  Murmurs.  Respiratory: clear  Gastrointestinal: Abdomen soft, non-tender. BS normal.    Data   No lab results found in last 7 days.    No results found for this or any previous visit (from the past 24 hour(s)).  Medications       aspirin  81 mg Oral Daily     atorvastatin  80 mg Oral At Bedtime     benztropine  1 mg Oral BID     docusate sodium  100 mg Oral BID     haloperidol  10 mg Oral TID     levothyroxine  88 mcg Oral Daily     memantine  5 mg Oral Daily     salmeterol  1 puff Inhalation BID     venlafaxine  75 mg Oral Daily with breakfast     vitamin E  800 Units Oral Daily

## 2021-04-30 NOTE — PLAN OF CARE
Pt a/ox4, forgetful. Pt had a couple periods of frustration about his stay here at the hospital. He expresses impatience and anger with how long the process has been.

## 2021-04-30 NOTE — PLAN OF CARE
DATE & TIME: 4/29/2021; 9283-4318  Cognitive Concerns/ Orientation : A & O x 1 (self only), audible and visual hallucinations, frequently listening and talking to someone that is not present  BEHAVIOR & AGGRESSION TOOL COLOR: Yellow, raised voice/swearing at staff, very upset about commitment, pt. Insisting there is a ride at the hospital entrance and had a bag packed with food and supplies, slammed door aggressively with frustration, de-escalation techniques utilized with little effect, security called, pt. Initially refusing to take medication, but did calm down and ativan given x 1 with some relief, MD notified, PRN Ativan dose adjusted, emotional support offered, calmed down toward end of shift   CIWA SCORE: NA   ABNL VS/O2: VSS on RA, daily vitals   MOBILITY: independent in room, SBA in hallways   PAIN MANAGMENT: denies   DIET: Regular. Tolerating well, encouraged water   BOWEL/BLADDER: BS active x 4, continent   ABNL LAB/BG: no new labs   DRAIN/DEVICES: NA   TELEMETRY RHYTHM: NA   SKIN: pale, intact, redness to back and elbows   TESTS/PROCEDURES: NA   D/C DATE: pending discharge   Discharge Barriers: placement and guardianship   OTHER IMPORTANT INFO: Door alarm in place; psych re-consulted; distraction activities (ambulation, food, TV, card games) not working as effectively on this shift when patient was agitated

## 2021-04-30 NOTE — PLAN OF CARE
DATE & TIME: 4/30/21; 3092-1433  Cognitive Concerns/ Orientation : A & O x 1 (self only), audible and visual hallucinations, frequently listening and talking to someone that is not present  BEHAVIOR & AGGRESSION TOOL COLOR: Green/Yellow, patient cooperative and pleasant this shift. No behavioral concerns and did not set off door alarm. Listens well to security and Ativan helpful.  CIWA SCORE: NA   ABNL VS/O2: VSS on RA, daily vitals   MOBILITY: Independent in room, SBA in hallways   PAIN MANAGMENT: denies   DIET: Regular. Tolerating well, encouraged water   BOWEL/BLADDER: BS active x 4, continent   ABNL LAB/BG: no new labs, needs COVID swab retested today - last result 4/21  DRAIN/DEVICES: NA   TELEMETRY RHYTHM: NA   SKIN: pale, intact, redness to back and elbows   TESTS/PROCEDURES: NA   D/C DATE: Pending discharge   Discharge Barriers: placement and guardianship, yesterday was updated to emergency guardianship.  OTHER IMPORTANT INFO: Door alarm in place; psych re-consulted; distraction activities (ambulation, food, TV, card games) not working as effectively on this shift when patient was agitated. Patient strongly believes a woman is going to come pick him up and packed up belongings.

## 2021-04-30 NOTE — CONSULTS
"[facility] Psychiatric Consult Progress Note    Interval History:   I met with the patient on station 66.  Reviewed with  and nursing staff. He had more agitation and frustration yesterday. Required a lot of redirection and some PRN intervention. He is frustrated about being here so long and wanted to leave. This morning, he is calm and cooperative. Admits to wanting to leave but not threatening today. He is jocular. He is still confused. Has no idea what year it is. Believes he is at [facility]. Denies depressed mood or anxiety. Denies hallucinations or paranoia. Denies thoughts of harming self or others. Reports he is walking fine. No problem with appetite. Sleeping well     Review of systems:   10 point Review of Systems completed by Lio Murrieta MD , and is  is negative other than noted in the HPI     Medications:       aspirin  81 mg Oral Daily     atorvastatin  80 mg Oral At Bedtime     benztropine  1 mg Oral BID     docusate sodium  100 mg Oral BID     haloperidol  10 mg Oral TID     levothyroxine  88 mcg Oral Daily     memantine  5 mg Oral Daily     salmeterol  1 puff Inhalation BID     venlafaxine  75 mg Oral Daily with breakfast     vitamin E  800 Units Oral Daily     acetaminophen, albuterol, alum & mag hydroxide-simethicone, haloperidol, LORazepam, melatonin, naloxone **OR** naloxone **OR** naloxone **OR** naloxone, nicotine, nicotine, OLANZapine, OLANZapine zydis, ondansetron **OR** ondansetron, polyethylene glycol    Mental Status Examination:   Appearance:  awake, alert  Eye Contact: Intense, he tends to stare blankly at times  Speech:  Impoverished, monotone,    language: minimal  Psychomotor Behavior:  some buccal-lingual dyskinesia, chewing movements  Mood: \"good\"  affect: jocular, good natured, teases   thought Process:  paucity of speech, no obvious loosening of associations flight of ideas or formal thought disorder  Thought Content:  no evidence of suicidal " "ideation or homicidal ideation, but at times he does tend to stare off into space, looks a guarded, but pleasant  Oriented to: Person only, he thought he was at the [facility] stating \"I am at [facility]\"  Attention Span and Concentration:  poor  Recent and Remote Memory:  limited  Fund of Knowledge: delayed  Insight:  limited  Judgment:  limited       Labs/Vitals:   No results found for this or any previous visit (from the past 24 hour(s)).  B/P: 123/95, T: 97.6, P: 89, R: 18  Impression  John is a 55-year-old gentleman with a known major neurocognitive disorder and unspecified psychotic illness. Difficult situation as he has had prolonged admission and is awaiting guardianship. I am told county did not believe he met criteria for emergency guardianship and he may have to wait several more months for guardianship.       DIagnoses:   1.  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   2.  Other schizophrenia spectrum disorder.   3.  Alcohol use disorder in remission.   4.  Stimulant use disorder, in remission.   5.  Chronic obstructive pulmonary disease.   6.  Hypertension.   7. R/O mild TD vs EPSE       Plan:   1.  Continue medications. PRN intervention seems to have helped yesterday. He is back in good spirits. Still, this is frustrating situation for him and he has been kept in the hospital for substantial period of time, unable to go outside, etc. I strongly recommend emergency guardianship to expedite his discharge from the hospital to group home where he would certainly benefit from and would be able to meet his required level of care, and be a less restrictive environment that he has earned an opportunity to try.   2. If further behavioral issues arise in coming days I would start scheduling olanzapine 5 mg BID to see if that helps reduce agitation risk to patient and staff.       Attestation:  Patient has been seen and evaluated by me,  " Lio Murrieta MD    Visit/Communication Style   In person, face-to-face

## 2021-05-01 PROCEDURE — 250N000013 HC RX MED GY IP 250 OP 250 PS 637: Performed by: INTERNAL MEDICINE

## 2021-05-01 PROCEDURE — 250N000013 HC RX MED GY IP 250 OP 250 PS 637: Performed by: PSYCHIATRY & NEUROLOGY

## 2021-05-01 PROCEDURE — 120N000001 HC R&B MED SURG/OB

## 2021-05-01 PROCEDURE — 250N000013 HC RX MED GY IP 250 OP 250 PS 637: Performed by: HOSPITALIST

## 2021-05-01 PROCEDURE — 99231 SBSQ HOSP IP/OBS SF/LOW 25: CPT | Performed by: INTERNAL MEDICINE

## 2021-05-01 RX ADMIN — SALMETEROL XINAFOATE 1 PUFF: 50 POWDER, METERED ORAL; RESPIRATORY (INHALATION) at 09:11

## 2021-05-01 RX ADMIN — BENZTROPINE MESYLATE 1 MG: 0.5 TABLET ORAL at 22:30

## 2021-05-01 RX ADMIN — Medication 800 UNITS: at 09:10

## 2021-05-01 RX ADMIN — ASPIRIN 81 MG: 81 TABLET, COATED ORAL at 09:09

## 2021-05-01 RX ADMIN — LORAZEPAM 2 MG: 1 TABLET ORAL at 17:54

## 2021-05-01 RX ADMIN — DOCUSATE SODIUM 100 MG: 100 CAPSULE, LIQUID FILLED ORAL at 22:30

## 2021-05-01 RX ADMIN — HALOPERIDOL 10 MG: 5 TABLET ORAL at 09:10

## 2021-05-01 RX ADMIN — ATORVASTATIN CALCIUM 80 MG: 40 TABLET, FILM COATED ORAL at 22:30

## 2021-05-01 RX ADMIN — VENLAFAXINE HYDROCHLORIDE 75 MG: 75 CAPSULE, EXTENDED RELEASE ORAL at 09:10

## 2021-05-01 RX ADMIN — MEMANTINE 5 MG: 5 TABLET ORAL at 09:10

## 2021-05-01 RX ADMIN — HALOPERIDOL 10 MG: 5 TABLET ORAL at 22:30

## 2021-05-01 RX ADMIN — DOCUSATE SODIUM 100 MG: 100 CAPSULE, LIQUID FILLED ORAL at 09:09

## 2021-05-01 RX ADMIN — LEVOTHYROXINE SODIUM 88 MCG: 88 TABLET ORAL at 09:10

## 2021-05-01 RX ADMIN — OLANZAPINE 10 MG: 5 TABLET, ORALLY DISINTEGRATING ORAL at 09:09

## 2021-05-01 RX ADMIN — HALOPERIDOL 10 MG: 5 TABLET ORAL at 15:33

## 2021-05-01 RX ADMIN — BENZTROPINE MESYLATE 1 MG: 0.5 TABLET ORAL at 09:10

## 2021-05-01 ASSESSMENT — ACTIVITIES OF DAILY LIVING (ADL)
ADLS_ACUITY_SCORE: 13

## 2021-05-01 NOTE — PLAN OF CARE
DATE & TIME: 5/1/21; 7-7:30pm  Cognitive Concerns/ Orientation : A & O x 1 (self only), audible and visual hallucinations, frequently listening and talking to someone that is not present  BEHAVIOR & AGGRESSION TOOL COLOR: Green/Yellow  CIWA SCORE: NA   ABNL VS/O2: daily vitals  MOBILITY: Independent in room, SBA in hallways   PAIN MANAGMENT: n/a  DIET: Regular. Tolerating well, encouraged water   BOWEL/BLADDER: Continent   ABNL LAB/BG: no new labs, Dr Barnes discontinued COVID swab after discussing it with charge RN.  Due to pt agitation and behaviors.    DRAIN/DEVICES: NA   TELEMETRY RHYTHM: NA   SKIN: pale, intact, redness to back and elbows. Had shower today.  TESTS/PROCEDURES: NA   D/C DATE: Pending Discharge Barriers: placement and guardianship, 4/29 was updated to emergency guardianship.  OTHER IMPORTANT INFO: Door alarm in place; psych re-consulted; distraction activities (ambulation, food, TV, card games)

## 2021-05-01 NOTE — PROGRESS NOTES
[facility]    Medicine Progress Note - Hospitalist Service       Date of Admission:  9/9/2020  Date of Service: 05/01/2021    Assessment & Plan          John Juárez is a 55 year old male with PMHx of schizophrenia, hx of TBI, alcohol use disorder, COPD, hyperlipidemia and hypothyroidism who was admitted on 9/9/2020 for evaluation of aggressive behavior at his group home.       Was evaluated by Psychiatry and his condition stabilized, though his group home was unwilling to take him back and thus hospitalization has been prolonged awaiting new placement and guardianship. Under civil commitment through [facility]     Neurocognitive disorder dt hx of TBI and alcohol use disorder  Schizophrenia  Under commitment  Had been residing in group home prior to admission.  Brought to hospital for evaluation of aggressive behaviors. Group home now unwilling to take him back. Has had numerous CODE 21s called this stay. Seen by psych, meds adjusted. Is currently under civil commitment and court hold through [facility] until 7/31/21.   --Continue on Haldol 10mg TID (timed for when patient tends to be most agitated), memantine 5mg daily, venlafaxine 75mg daily, benztropine 1mg BID   -- prns available for agitation (including Haldol, Zyprexa and Ativan)  -- SW following for placement, placement will occur once guardianship is established  -- less agitated today     Possible Tardive Dyskinesia vs EPSE  Per psych assessment on 4/1/21, noted to have possible tardive dyskinesia vs extrapyramidal side effects of meds. At that time, recommended to increase Cogentin to 1mg BID and add vitamin E 800 international unit(s) daily.      Back cellulitis in 1/2021: Resolved  Noted on 1/24/21, initiated on cephalexin at that time. Received local wound care and completed 7-day course of antibiotics on 1/30     Baseline Hypotension  BP 90-110s systolic on average. Asymptomatic. No  concerns     Hyperlipidemia  Chronic and stable on llow dose ASA and  atorvastatin     COPD  Not O2 dependent. Chronic and stable on salmeterol, albuterol prn     Hypothyroidism  Chronic and stable on levothyroxine     Tobacco use disorder  Using nicotine patch and PRN gum     Resting tremor of upper extremities  No acute issues           Diet: Room Service  Diet  Combination Diet Regular Diet Adult; Safe Tray - with utensils  Snacks/Supplements Adult: Other; Bedtime snack; Between Meals  Room Service    DVT Prophylaxis: Pneumatic Compression Devices  Valentin Catheter: not present  Code Status: Full Code           Disposition Plan   Expected discharge: when placement found        Deuce Turner MD  Hospitalist Service  [facility]  Contact information available via [facility] Paging/Directory    ______________________________________________________________________    Interval History    Increased agitation yesterday afternoon, slamming door, more aggressive -- did agree to take oral medication, calmer after Ativan.     Physical Exam   Vital Signs: Temp: 98  F (36.7  C) Temp src: Oral BP: 119/76 Pulse: 99   Resp: 20 SpO2: 97 % O2 Device: None (Room air)    Weight: 185 lbs 9.6 oz    Exam:   Lying in bed  Cardiovascular: RRR.  No  Murmurs.  Respiratory: clear  Gastrointestinal: Abdomen soft, non-tender. BS normal.    Data   No lab results found in last 7 days.    No results found for this or any previous visit (from the past 24 hour(s)).  Medications       aspirin  81 mg Oral Daily     atorvastatin  80 mg Oral At Bedtime     benztropine  1 mg Oral BID     docusate sodium  100 mg Oral BID     haloperidol  10 mg Oral TID     levothyroxine  88 mcg Oral Daily     memantine  5 mg Oral Daily     salmeterol  1 puff Inhalation BID     venlafaxine  75 mg Oral Daily with breakfast     vitamin E  800 Units Oral Daily

## 2021-05-01 NOTE — PLAN OF CARE
DATE & TIME: 4/30/21; 9971-0059  Cognitive Concerns/ Orientation : A & O x 1 (self only), audible and visual hallucinations, frequently listening and talking to someone that is not present  BEHAVIOR & AGGRESSION TOOL COLOR: Green/Yellow, agitated at start of shift, prn ativan given x1.  Came out to desk at times, redirectable.  Ambulated multiple times in halls with staff.  CIWA SCORE: NA   ABNL VS/O2: VSS on RA, daily vitals   MOBILITY: Independent in room, SBA in hallways   PAIN MANAGMENT: denies   DIET: Regular. Tolerating well, encouraged water   BOWEL/BLADDER: BS active x 4, continent   ABNL LAB/BG: no new labs, Dr Barnes discontinued COVID swab after discussing it with charge RN, Shellie.  Due to pt agitation, behaviors.    DRAIN/DEVICES: NA   TELEMETRY RHYTHM: NA   SKIN: pale, intact, redness to back and elbows   TESTS/PROCEDURES: NA   D/C DATE: Pending discharge   Discharge Barriers: placement and guardianship, 4/29 was updated to emergency guardianship.  OTHER IMPORTANT INFO: Door alarm in place; psych re-consulted; distraction activities (ambulation, food, TV, card games) .  Showered this evening.

## 2021-05-02 PROCEDURE — 99231 SBSQ HOSP IP/OBS SF/LOW 25: CPT | Performed by: INTERNAL MEDICINE

## 2021-05-02 PROCEDURE — 250N000013 HC RX MED GY IP 250 OP 250 PS 637: Performed by: INTERNAL MEDICINE

## 2021-05-02 PROCEDURE — 250N000013 HC RX MED GY IP 250 OP 250 PS 637: Performed by: HOSPITALIST

## 2021-05-02 PROCEDURE — 250N000013 HC RX MED GY IP 250 OP 250 PS 637: Performed by: PSYCHIATRY & NEUROLOGY

## 2021-05-02 PROCEDURE — 120N000001 HC R&B MED SURG/OB

## 2021-05-02 RX ADMIN — DOCUSATE SODIUM 100 MG: 100 CAPSULE, LIQUID FILLED ORAL at 10:13

## 2021-05-02 RX ADMIN — HALOPERIDOL 10 MG: 5 TABLET ORAL at 20:41

## 2021-05-02 RX ADMIN — SALMETEROL XINAFOATE 1 PUFF: 50 POWDER, METERED ORAL; RESPIRATORY (INHALATION) at 20:41

## 2021-05-02 RX ADMIN — VENLAFAXINE HYDROCHLORIDE 75 MG: 75 CAPSULE, EXTENDED RELEASE ORAL at 10:13

## 2021-05-02 RX ADMIN — LEVOTHYROXINE SODIUM 88 MCG: 88 TABLET ORAL at 10:13

## 2021-05-02 RX ADMIN — LORAZEPAM 2 MG: 1 TABLET ORAL at 14:15

## 2021-05-02 RX ADMIN — HALOPERIDOL 10 MG: 5 TABLET ORAL at 10:13

## 2021-05-02 RX ADMIN — ASPIRIN 81 MG: 81 TABLET, COATED ORAL at 10:13

## 2021-05-02 RX ADMIN — HALOPERIDOL 10 MG: 5 TABLET ORAL at 14:18

## 2021-05-02 RX ADMIN — BENZTROPINE MESYLATE 1 MG: 0.5 TABLET ORAL at 20:42

## 2021-05-02 RX ADMIN — ATORVASTATIN CALCIUM 80 MG: 40 TABLET, FILM COATED ORAL at 20:42

## 2021-05-02 RX ADMIN — Medication 800 UNITS: at 10:13

## 2021-05-02 RX ADMIN — SALMETEROL XINAFOATE 1 PUFF: 50 POWDER, METERED ORAL; RESPIRATORY (INHALATION) at 10:15

## 2021-05-02 RX ADMIN — DOCUSATE SODIUM 100 MG: 100 CAPSULE, LIQUID FILLED ORAL at 20:42

## 2021-05-02 RX ADMIN — BENZTROPINE MESYLATE 1 MG: 0.5 TABLET ORAL at 10:13

## 2021-05-02 RX ADMIN — MEMANTINE 5 MG: 5 TABLET ORAL at 10:13

## 2021-05-02 ASSESSMENT — ACTIVITIES OF DAILY LIVING (ADL)
ADLS_ACUITY_SCORE: 13

## 2021-05-02 NOTE — PROGRESS NOTES
[facility]    Medicine Progress Note - Hospitalist Service       Date of Admission:  9/9/2020  Date of Service: 05/02/2021    Assessment & Plan          John Juárez is a 55 year old male with PMHx of schizophrenia, hx of TBI, alcohol use disorder, COPD, hyperlipidemia and hypothyroidism who was admitted on 9/9/2020 for evaluation of aggressive behavior at his group home.       Was evaluated by Psychiatry and his condition stabilized, though his group home was unwilling to take him back and thus hospitalization has been prolonged awaiting new placement and guardianship. Under civil commitment through [facility]     Neurocognitive disorder dt hx of TBI and alcohol use disorder  Schizophrenia  Under commitment  Had been residing in group home prior to admission.  Brought to hospital for evaluation of aggressive behaviors. Group home now unwilling to take him back. Has had numerous CODE 21s called this stay. Seen by psych, meds adjusted. Is currently under civil commitment and court hold through [facility] until 7/31/21.   --Continue on Haldol 10mg TID (timed for when patient tends to be most agitated), memantine 5mg daily, venlafaxine 75mg daily, benztropine 1mg BID   -- prns available for agitation (including Haldol, Zyprexa and Ativan)  -- SW following for placement, placement will occur once guardianship is established  -- less agitated today     Possible Tardive Dyskinesia vs EPSE  Per psych assessment on 4/1/21, noted to have possible tardive dyskinesia vs extrapyramidal side effects of meds. At that time, recommended to increase Cogentin to 1mg BID and add vitamin E 800 international unit(s) daily.      Back cellulitis in 1/2021: Resolved  Noted on 1/24/21, initiated on cephalexin at that time. Received local wound care and completed 7-day course of antibiotics on 1/30     Baseline Hypotension  BP 90-110s systolic on average. Asymptomatic. No  concerns     Hyperlipidemia  Chronic and stable on llow dose ASA and  atorvastatin     COPD  Not O2 dependent. Chronic and stable on salmeterol, albuterol prn     Hypothyroidism  Chronic and stable on levothyroxine     Tobacco use disorder  Using nicotine patch and PRN gum     Resting tremor of upper extremities  No acute issues           Diet: Room Service  Diet  Combination Diet Regular Diet Adult; Safe Tray - with utensils  Snacks/Supplements Adult: Other; Bedtime snack; Between Meals  Room Service    DVT Prophylaxis: Pneumatic Compression Devices  Valentin Catheter: not present  Code Status: Full Code           Disposition Plan   Expected discharge: when placement found        Deuce Turner MD  Hospitalist Service  [facility]  Contact information available via [facility] Paging/Directory    ______________________________________________________________________    Interval History    Increased agitation yesterday afternoon, slamming door, more aggressive -- did agree to take oral medication, calmer after Ativan.     Physical Exam   Vital Signs: Temp: 97.5  F (36.4  C) Temp src: Oral BP: 100/60 Pulse: 96   Resp: 18 SpO2: 96 % O2 Device: None (Room air)    Weight: 185 lbs 9.6 oz    Exam:   Lying in bed  Cardiovascular: RRR.  No  Murmurs.  Respiratory: clear  Gastrointestinal: Abdomen soft, non-tender. BS normal.    Data   No lab results found in last 7 days.    No results found for this or any previous visit (from the past 24 hour(s)).  Medications       aspirin  81 mg Oral Daily     atorvastatin  80 mg Oral At Bedtime     benztropine  1 mg Oral BID     docusate sodium  100 mg Oral BID     haloperidol  10 mg Oral TID     levothyroxine  88 mcg Oral Daily     memantine  5 mg Oral Daily     salmeterol  1 puff Inhalation BID     venlafaxine  75 mg Oral Daily with breakfast     vitamin E  800 Units Oral Daily

## 2021-05-02 NOTE — PLAN OF CARE
DATE & TIME: 5/1/21 1900 - 2430  Cognitive Concerns/ Orientation : A & O x 1 (self only), audible and visual hallucinations, frequently listening and talking to someone that is not present  BEHAVIOR & AGGRESSION TOOL COLOR: Green/Yellow  CIWA SCORE: NA   ABNL VS/O2: daily vitals  MOBILITY: Independent in room, SBA in hallways   PAIN MANAGMENT: n/a  DIET: Regular. Tolerating well, encouraged water   BOWEL/BLADDER: Continent   ABNL LAB/BG: no new labs  DRAIN/DEVICES: NA   TELEMETRY RHYTHM: NA   SKIN: pale, intact, redness to back and elbows  TESTS/PROCEDURES: NA   D/C DATE: Pending Discharge Barriers: placement and guardianship, 4/29 was updated to emergency guardianship.  OTHER IMPORTANT INFO: Door alarm in place; psych re-consulted; distraction activities (ambulation, food, TV, card games); patient slept well overnight

## 2021-05-02 NOTE — PLAN OF CARE
DATE & TIME: 5/2/21 7-7:30pm  Cognitive Concerns/ Orientation : A & O x 1 (self only), audible and visual hallucinations, frequently listening and talking to someone that is not present  BEHAVIOR & AGGRESSION TOOL COLOR: Green/Yellow  CIWA SCORE: NA   ABNL VS/O2: daily vitals  MOBILITY: Independent in room, SBA in hallways   PAIN MANAGMENT: n/a  DIET: Regular. Tolerating well, encouraged water   BOWEL/BLADDER: Continent   ABNL LAB/BG: no new labs  DRAIN/DEVICES: NA   TELEMETRY RHYTHM: NA   SKIN: pale, intact, redness to back and elbows  TESTS/PROCEDURES: NA   D/C DATE: Pending Discharge Barriers: placement and guardianship, 4/29 was updated to emergency guardianship.  OTHER IMPORTANT INFO: Ativan given X1 for increased agitation about leaving the hospital/helpful. Door alarm in place; psych re-consulted; distraction activities (ambulation, food, TV, card games)

## 2021-05-03 PROCEDURE — 120N000001 HC R&B MED SURG/OB

## 2021-05-03 PROCEDURE — 250N000013 HC RX MED GY IP 250 OP 250 PS 637: Performed by: INTERNAL MEDICINE

## 2021-05-03 PROCEDURE — 250N000013 HC RX MED GY IP 250 OP 250 PS 637: Performed by: PSYCHIATRY & NEUROLOGY

## 2021-05-03 PROCEDURE — 250N000013 HC RX MED GY IP 250 OP 250 PS 637: Performed by: HOSPITALIST

## 2021-05-03 PROCEDURE — 99231 SBSQ HOSP IP/OBS SF/LOW 25: CPT | Performed by: HOSPITALIST

## 2021-05-03 PROCEDURE — U0005 INFEC AGEN DETEC AMPLI PROBE: HCPCS | Performed by: HOSPITALIST

## 2021-05-03 PROCEDURE — U0003 INFECTIOUS AGENT DETECTION BY NUCLEIC ACID (DNA OR RNA); SEVERE ACUTE RESPIRATORY SYNDROME CORONAVIRUS 2 (SARS-COV-2) (CORONAVIRUS DISEASE [COVID-19]), AMPLIFIED PROBE TECHNIQUE, MAKING USE OF HIGH THROUGHPUT TECHNOLOGIES AS DESCRIBED BY CMS-2020-01-R: HCPCS | Performed by: HOSPITALIST

## 2021-05-03 RX ADMIN — SALMETEROL XINAFOATE 1 PUFF: 50 POWDER, METERED ORAL; RESPIRATORY (INHALATION) at 07:57

## 2021-05-03 RX ADMIN — BENZTROPINE MESYLATE 1 MG: 0.5 TABLET ORAL at 07:58

## 2021-05-03 RX ADMIN — LORAZEPAM 2 MG: 1 TABLET ORAL at 15:36

## 2021-05-03 RX ADMIN — VENLAFAXINE HYDROCHLORIDE 75 MG: 75 CAPSULE, EXTENDED RELEASE ORAL at 07:58

## 2021-05-03 RX ADMIN — HALOPERIDOL 10 MG: 5 TABLET ORAL at 20:28

## 2021-05-03 RX ADMIN — DOCUSATE SODIUM 100 MG: 100 CAPSULE, LIQUID FILLED ORAL at 20:28

## 2021-05-03 RX ADMIN — MELATONIN TAB 3 MG 3 MG: 3 TAB at 23:10

## 2021-05-03 RX ADMIN — OLANZAPINE 10 MG: 5 TABLET, ORALLY DISINTEGRATING ORAL at 09:05

## 2021-05-03 RX ADMIN — LORAZEPAM 2 MG: 1 TABLET ORAL at 23:10

## 2021-05-03 RX ADMIN — HALOPERIDOL 10 MG: 5 TABLET ORAL at 14:11

## 2021-05-03 RX ADMIN — BENZTROPINE MESYLATE 1 MG: 0.5 TABLET ORAL at 20:28

## 2021-05-03 RX ADMIN — ASPIRIN 81 MG: 81 TABLET, COATED ORAL at 07:57

## 2021-05-03 RX ADMIN — MEMANTINE 5 MG: 5 TABLET ORAL at 07:58

## 2021-05-03 RX ADMIN — ATORVASTATIN CALCIUM 80 MG: 40 TABLET, FILM COATED ORAL at 20:28

## 2021-05-03 RX ADMIN — SALMETEROL XINAFOATE 1 PUFF: 50 POWDER, METERED ORAL; RESPIRATORY (INHALATION) at 20:32

## 2021-05-03 RX ADMIN — LEVOTHYROXINE SODIUM 88 MCG: 88 TABLET ORAL at 07:57

## 2021-05-03 RX ADMIN — Medication 800 UNITS: at 07:58

## 2021-05-03 RX ADMIN — HALOPERIDOL 10 MG: 5 TABLET ORAL at 07:58

## 2021-05-03 RX ADMIN — DOCUSATE SODIUM 100 MG: 100 CAPSULE, LIQUID FILLED ORAL at 07:58

## 2021-05-03 ASSESSMENT — ACTIVITIES OF DAILY LIVING (ADL)
ADLS_ACUITY_SCORE: 13

## 2021-05-03 NOTE — PROGRESS NOTES
CORRECTION TO CHARTING ENTERED BY NURSING STAFF  Nursing is documenting that the guardianship has been escalated to Emergency Petition.  This is INCORRECT.  This writer is attempting to establish the guardianship be an emergency guardianship however at this time the  representing the petition does not believe patient's situation meets the state statue for an emergency.  This is because patient is in a safe setting where his well being is cared for.  This writer will update documentation as more is known.

## 2021-05-03 NOTE — PLAN OF CARE
DATE & TIME: 5/2/21 1900 - 0730  Cognitive Concerns/ Orientation : A & O x 1 (self only), audible and visual hallucinations, frequently listening and talking to someone that is not present  BEHAVIOR & AGGRESSION TOOL COLOR: Green/Yellow, no behaviors overnight  CIWA SCORE: NA   ABNL VS/O2: daily vitals  MOBILITY: Independent in room, SBA in hallways   PAIN MANAGMENT: n/a  DIET: Regular. Tolerating well, encouraged water   BOWEL/BLADDER: Continent   ABNL LAB/BG: no new labs  DRAIN/DEVICES: NA   TELEMETRY RHYTHM: NA   SKIN: pale, intact, redness to back and elbows  TESTS/PROCEDURES: NA   D/C DATE: Pending Discharge Barriers: placement and guardianship, 4/29 was updated to emergency guardianship.  OTHER IMPORTANT INFO: ativan given X1 on day shift. Door alarm in place; psych re-consulted; distraction activities (ambulation, food, TV, card games); patient slept well during the NOC

## 2021-05-03 NOTE — PROGRESS NOTES
[facility]    Medicine Progress Note - Hospitalist Service       Date of Admission:  9/9/2020  Date of Service: 05/03/2021    Assessment & Plan          John Juárez is a 55 year old male with PMHx of schizophrenia, hx of TBI, alcohol use disorder, COPD, hyperlipidemia and hypothyroidism who was admitted on 9/9/2020 for evaluation of aggressive behavior at his group home.       Was evaluated by Psychiatry and his condition stabilized, though his group home was unwilling to take him back and thus hospitalization has been prolonged awaiting new placement and guardianship. Under civil commitment through [facility]     Neurocognitive disorder dt hx of TBI and alcohol use disorder  Schizophrenia  Under commitment  Had been residing in group home prior to admission.  Brought to hospital for evaluation of aggressive behaviors. Group home now unwilling to take him back. Has had numerous CODE 21s called this stay. Seen by psych, meds adjusted. Is currently under civil commitment and court hold through [facility] until 7/31/21.   --Continue on Haldol 10mg TID (timed for when patient tends to be most agitated), memantine 5mg daily, venlafaxine 75mg daily, benztropine 1mg BID   -- prns available for agitation (including Haldol, Zyprexa and Ativan)  -- SW following for placement, placement will occur once guardianship is established     Possible Tardive Dyskinesia vs EPSE  Per psych assessment on 4/1/21, noted to have possible tardive dyskinesia vs extrapyramidal side effects of meds. At that time, recommended to increase Cogentin to 1mg BID and add vitamin E 800 international unit(s) daily.      Back cellulitis in 1/2021: Resolved  Noted on 1/24/21, initiated on cephalexin at that time. Received local wound care and completed 7-day course of antibiotics on 1/30     Baseline Hypotension  BP 90-110s systolic on average. Asymptomatic. No concerns     Hyperlipidemia  Chronic and stable on  llow dose ASA and  atorvastatin     COPD  Not O2 dependent. Chronic and stable on salmeterol, albuterol prn     Hypothyroidism  Chronic and stable on levothyroxine     Tobacco use disorder  Using nicotine patch and PRN gum     Resting tremor of upper extremities  No acute issues           Diet: Room Service  Diet  Combination Diet Regular Diet Adult; Safe Tray - with utensils  Snacks/Supplements Adult: Other; Bedtime snack; Between Meals  Room Service    DVT Prophylaxis: Pneumatic Compression Devices  Valentin Catheter: not present  Code Status: Full Code           Disposition Plan   Expected discharge: when placement found        Sushil Genao MD  Hospitalist Service  [facility]  Contact information available via [facility] Paging/Directory    ______________________________________________________________________    Interval History    Was out ambulating around the unit at time of visit today.      Discussed with RN, no concerns.     Physical Exam   Vital Signs: Temp: 97.6  F (36.4  C) Temp src: Oral BP: 119/83 Pulse: 87   Resp: 18 SpO2: 97 % O2 Device: None (Room air)    Weight: 185 lbs 9.6 oz    Exam:   General:  Well nourished male, ambulating independently in cruz  Cardiovascular:   Respiratory: respirations non-labored on room air, no tachypnea  Gastrointestinal:     Data   No lab results found in last 7 days.    No results found for this or any previous visit (from the past 24 hour(s)).  Medications       aspirin  81 mg Oral Daily     atorvastatin  80 mg Oral At Bedtime     benztropine  1 mg Oral BID     docusate sodium  100 mg Oral BID     haloperidol  10 mg Oral TID     levothyroxine  88 mcg Oral Daily     memantine  5 mg Oral Daily     salmeterol  1 puff Inhalation BID     venlafaxine  75 mg Oral Daily with breakfast     vitamin E  800 Units Oral Daily

## 2021-05-03 NOTE — PLAN OF CARE
DATE & TIME: 5/3/21 7-3pm  Cognitive Concerns/ Orientation : A & O x 1 (self only), audible and visual hallucinations, frequently listening and talking to someone that is not present  BEHAVIOR & AGGRESSION TOOL COLOR: Green/Yellow, no behaviors overnight  CIWA SCORE: NA   ABNL VS/O2: daily vitals  MOBILITY: Independent in room, SBA in hallways   PAIN MANAGMENT: n/a  DIET: Regular. Tolerating well, encouraged water   BOWEL/BLADDER: Continent   ABNL LAB/BG: no new labs  DRAIN/DEVICES: NA   TELEMETRY RHYTHM: NA   SKIN: pale, intact  TESTS/PROCEDURES: NA   D/C DATE: Pending Discharge Barriers: placement and guardianship  OTHER IMPORTANT INFO: ativan given X1 on day shift. Door alarm in place; psych re-consulted; distraction activities (ambulation, food, TV, card games); patient slept well during the NOC

## 2021-05-04 PROCEDURE — 250N000013 HC RX MED GY IP 250 OP 250 PS 637: Performed by: HOSPITALIST

## 2021-05-04 PROCEDURE — 250N000013 HC RX MED GY IP 250 OP 250 PS 637: Performed by: PSYCHIATRY & NEUROLOGY

## 2021-05-04 PROCEDURE — 120N000001 HC R&B MED SURG/OB

## 2021-05-04 PROCEDURE — 99232 SBSQ HOSP IP/OBS MODERATE 35: CPT | Performed by: PHYSICIAN ASSISTANT

## 2021-05-04 PROCEDURE — 250N000013 HC RX MED GY IP 250 OP 250 PS 637: Performed by: INTERNAL MEDICINE

## 2021-05-04 RX ADMIN — BENZTROPINE MESYLATE 1 MG: 0.5 TABLET ORAL at 09:32

## 2021-05-04 RX ADMIN — Medication 800 UNITS: at 09:31

## 2021-05-04 RX ADMIN — HALOPERIDOL 10 MG: 5 TABLET ORAL at 15:50

## 2021-05-04 RX ADMIN — HALOPERIDOL 10 MG: 5 TABLET ORAL at 20:26

## 2021-05-04 RX ADMIN — BENZTROPINE MESYLATE 1 MG: 0.5 TABLET ORAL at 20:26

## 2021-05-04 RX ADMIN — DOCUSATE SODIUM 100 MG: 100 CAPSULE, LIQUID FILLED ORAL at 09:32

## 2021-05-04 RX ADMIN — MEMANTINE 5 MG: 5 TABLET ORAL at 09:32

## 2021-05-04 RX ADMIN — ASPIRIN 81 MG: 81 TABLET, COATED ORAL at 09:32

## 2021-05-04 RX ADMIN — SALMETEROL XINAFOATE 1 PUFF: 50 POWDER, METERED ORAL; RESPIRATORY (INHALATION) at 20:26

## 2021-05-04 RX ADMIN — VENLAFAXINE HYDROCHLORIDE 75 MG: 75 CAPSULE, EXTENDED RELEASE ORAL at 09:31

## 2021-05-04 RX ADMIN — ATORVASTATIN CALCIUM 80 MG: 40 TABLET, FILM COATED ORAL at 20:26

## 2021-05-04 RX ADMIN — DOCUSATE SODIUM 100 MG: 100 CAPSULE, LIQUID FILLED ORAL at 20:26

## 2021-05-04 RX ADMIN — LEVOTHYROXINE SODIUM 88 MCG: 88 TABLET ORAL at 09:32

## 2021-05-04 RX ADMIN — LORAZEPAM 2 MG: 1 TABLET ORAL at 13:05

## 2021-05-04 RX ADMIN — HALOPERIDOL 10 MG: 5 TABLET ORAL at 09:32

## 2021-05-04 ASSESSMENT — ACTIVITIES OF DAILY LIVING (ADL)
ADLS_ACUITY_SCORE: 13

## 2021-05-04 NOTE — PLAN OF CARE
DATE & TIME: 5/4/21 0700-1930  Cognitive Concerns/ Orientation : Confused  BEHAVIOR & AGGRESSION TOOL COLOR: Green, yellow at times  CIWA SCORE: NA  ABNL VS/O2: VSS on RA, elevated /93  MOBILITY: Ind in room  PAIN MANAGMENT: Denies  DIET: Reg  BOWEL/BLADDER: Up to BR  ABNL LAB/BG: COVID neg 5/3  DRAIN/DEVICES: None  TELEMETRY RHYTHM: NA  SKIN: Intact  TESTS/PROCEDURES: None  D/C DATE: Pending placement and guardianship  Discharge Barriers: Placement and guardianship  OTHER IMPORTANT INFO: Pt anxious on day shift. Ativan given x1, scheduled Haldol.

## 2021-05-04 NOTE — PROGRESS NOTES
[facility]    Medicine Progress Note - Hospitalist Service       Date of Admission:  9/9/2020  Date of Service: 05/04/2021    Assessment & Plan        John Juárez is a 55 year old male with PMHx of schizophrenia, hx of TBI, alcohol use disorder, COPD, hyperlipidemia and hypothyroidism who was admitted on 9/9/2020 for evaluation of aggressive behavior at his group home.    Was evaluated by Psychiatry and his condition stabilized, though his group home was unwilling to take him back and thus hospitalization has been prolonged awaiting new placement and guardianship. Under civil commitment through [facility].     Neurocognitive disorder dt hx of TBI and alcohol use disorder  Schizophrenia  Under commitment  Had been residing in group home prior to admission.  Brought to hospital for evaluation of aggressive behaviors. Group home now unwilling to take him back. Has had numerous CODE 21s called this stay. Seen by psych, meds adjusted. Is currently under civil commitment and court hold through [facility] until 7/31/21.   --Continue on Haldol 10mg TID (timed for when patient tends to be most agitated), memantine 5mg daily, venlafaxine 75mg daily, benztropine 1mg BID   -- prns available for agitation (including Haldol, Zyprexa and Ativan)  -- SW following for placement, placement will occur once guardianship is established     Possible Tardive Dyskinesia vs EPSE  Per psych assessment on 4/1/21, noted to have possible tardive dyskinesia vs extrapyramidal side effects of meds. At that time, recommended to increase Cogentin to 1mg BID and add vitamin E 800 international unit(s) daily.      Back cellulitis in 1/2021: Resolved  Noted on 1/24/21, initiated on cephalexin at that time. Received local wound care and completed 7-day course of antibiotics on 1/30     Baseline Hypotension  BP 90-110s systolic on average. Asymptomatic. No concerns     Hyperlipidemia  Chronic and stable on [facility]  dose ASA and  atorvastatin     COPD  Not O2 dependent. Chronic and stable on salmeterol, albuterol prn     Hypothyroidism  Chronic and stable on levothyroxine     Tobacco use disorder  Using nicotine patch and PRN gum     Resting tremor of upper extremities  No acute issues     Hx of infective endocarditis with severe mitral and aortic regurgitation S/P bioprosthetic valve replacement (10/2015)  Systolic heart murmur  HFrEF, not in acute exacerbation: Follows with Dr. Dockery of [facility], last seen in 1/2020. Note heart murmur on exam, strongest in aortic region. No CP, SOB, dizziness, syncope.   * Echocardiogram in 5/2016 with EF 50%, no evidence for prosthetic mitral and aortic valve dysfunction.   - No indication for repeat echocardiogram at this time unless pt develops cardiac sx   - Follow-up with outpatient Cardiology upon dismissal from the hospital (on every 2 year follow-ups)  - Continue with antibiotic prophylaxis prior to dental work   - Continue ASA     Hx of CVA: Note basal ganglion stroke in 2015. No focal neuro deficits aside from cognitive dysfunction as above.       Diet: Room Service  Diet  Combination Diet Regular Diet Adult; Safe Tray - with utensils  Snacks/Supplements Adult: Other; Bedtime snack; Between Meals  Room Service    DVT Prophylaxis: Pneumatic Compression Devices  Valentin Catheter: not present  Code Status: Full Code         Disposition Plan   Expected discharge: when placement found      Case discussed with Dr. Genao.     Anahi Armas PA-C  Hospitalist Service  [facility]  Contact information available via [facility] Paging/Directory  ______________________________________________________________________    Interval History    No acute events documented overnight. Denies pain including CP, SOB, abdominal pain. Biggest concern is if I can get him some Giovanny Benson.     Physical Exam   Vital Signs: Temp: 96.4  F (35.8  C)  Temp src: Oral BP: 128/88 Pulse: 74   Resp: 16 SpO2: 97 % O2 Device: None (Room air)    Weight: 185 lbs 9.6 oz    CONSTITUTIONAL: Pt laying in bed, dressed in hospital garb. Appears comfortable. Cooperative with interview.   HEENT: Normocephalic, atraumatic.   CARDIOVASCULAR: RRR, 2+ systolic murmur in aortic region. No rubs or extra heart sounds appreciated. Pulses +2/4 and regular in upper and lower extremities, bilaterally.   RESPIRATORY: No increased work of breathing.  CTA, bilat; no wheezes, rales, or rhonchi appreciated.  GASTROINTESTINAL:  Abdomen soft, non-distended. BS auscultated in all four quadrants. Negative for tenderness to palpation.    MUSCULOSKELETAL: No gross deformities noted. Normal muscle tone.   HEMATOLOGIC/LYMPHATIC/IMMUNOLOGIC: Negative for lower extremity edema, bilaterally.  NEUROLOGIC: Alert and oriented to person, repeatedly asks when he can leave. No focal neuro deficits.   SKIN: Warm, dry, intact. No jaundice noted. Negative for suspicious lesions, rashes, bruising, open sores or abrasions.     Data   No lab results found in last 7 days.    No results found for this or any previous visit (from the past 24 hour(s)).  Medications       aspirin  81 mg Oral Daily     atorvastatin  80 mg Oral At Bedtime     benztropine  1 mg Oral BID     docusate sodium  100 mg Oral BID     haloperidol  10 mg Oral TID     levothyroxine  88 mcg Oral Daily     memantine  5 mg Oral Daily     salmeterol  1 puff Inhalation BID     venlafaxine  75 mg Oral Daily with breakfast     vitamin E  800 Units Oral Daily

## 2021-05-04 NOTE — PLAN OF CARE
Cognitive Concerns/ Orientation : A & O x 1 (self only). Having visual Hallucination. Restless; came out of his room multiple times; PRN Ativanx1 given; helpful  BEHAVIOR & AGGRESSION TOOL COLOR: Green/Yellow  CIWA SCORE: NA   ABNL VS/O2: daily vitals  MOBILITY: Independent in room, SBA in hallways   PAIN MANAGMENT: n/a  DIET: Regular. Tolerating well, encouraged water   BOWEL/BLADDER: Continent   ABNL LAB/BG: no new labs  DRAIN/DEVICES: NA   TELEMETRY RHYTHM: NA   SKIN: pale, intact  TESTS/PROCEDURES: NA   D/C DATE: Pending Discharge Barriers: placement and when guardianship established.  OTHER IMPORTANT INFO: Remains under civil commitment and on Court hold through [facility] until 7/31/21. Door alarm in place. Long arm sitter.

## 2021-05-05 PROCEDURE — 250N000013 HC RX MED GY IP 250 OP 250 PS 637: Performed by: HOSPITALIST

## 2021-05-05 PROCEDURE — 250N000013 HC RX MED GY IP 250 OP 250 PS 637: Performed by: PSYCHIATRY & NEUROLOGY

## 2021-05-05 PROCEDURE — 99231 SBSQ HOSP IP/OBS SF/LOW 25: CPT | Performed by: HOSPITALIST

## 2021-05-05 PROCEDURE — 250N000013 HC RX MED GY IP 250 OP 250 PS 637: Performed by: INTERNAL MEDICINE

## 2021-05-05 PROCEDURE — 120N000001 HC R&B MED SURG/OB

## 2021-05-05 RX ADMIN — MEMANTINE 5 MG: 5 TABLET ORAL at 08:07

## 2021-05-05 RX ADMIN — LEVOTHYROXINE SODIUM 88 MCG: 88 TABLET ORAL at 08:07

## 2021-05-05 RX ADMIN — HALOPERIDOL 10 MG: 5 TABLET ORAL at 08:07

## 2021-05-05 RX ADMIN — ASPIRIN 81 MG: 81 TABLET, COATED ORAL at 08:07

## 2021-05-05 RX ADMIN — SALMETEROL XINAFOATE 1 PUFF: 50 POWDER, METERED ORAL; RESPIRATORY (INHALATION) at 09:33

## 2021-05-05 RX ADMIN — SALMETEROL XINAFOATE 1 PUFF: 50 POWDER, METERED ORAL; RESPIRATORY (INHALATION) at 20:17

## 2021-05-05 RX ADMIN — BENZTROPINE MESYLATE 1 MG: 0.5 TABLET ORAL at 08:07

## 2021-05-05 RX ADMIN — NICOTINE 1 PATCH: 7 PATCH, EXTENDED RELEASE TRANSDERMAL at 11:51

## 2021-05-05 RX ADMIN — ATORVASTATIN CALCIUM 80 MG: 40 TABLET, FILM COATED ORAL at 20:16

## 2021-05-05 RX ADMIN — HALOPERIDOL 10 MG: 5 TABLET ORAL at 14:13

## 2021-05-05 RX ADMIN — DOCUSATE SODIUM 100 MG: 100 CAPSULE, LIQUID FILLED ORAL at 08:07

## 2021-05-05 RX ADMIN — DOCUSATE SODIUM 100 MG: 100 CAPSULE, LIQUID FILLED ORAL at 20:16

## 2021-05-05 RX ADMIN — Medication 800 UNITS: at 08:07

## 2021-05-05 RX ADMIN — HALOPERIDOL 10 MG: 5 TABLET ORAL at 20:17

## 2021-05-05 RX ADMIN — BENZTROPINE MESYLATE 1 MG: 0.5 TABLET ORAL at 20:16

## 2021-05-05 RX ADMIN — OLANZAPINE 10 MG: 5 TABLET, ORALLY DISINTEGRATING ORAL at 12:52

## 2021-05-05 RX ADMIN — LORAZEPAM 2 MG: 1 TABLET ORAL at 11:51

## 2021-05-05 RX ADMIN — VENLAFAXINE HYDROCHLORIDE 75 MG: 75 CAPSULE, EXTENDED RELEASE ORAL at 08:07

## 2021-05-05 ASSESSMENT — ACTIVITIES OF DAILY LIVING (ADL)
ADLS_ACUITY_SCORE: 13

## 2021-05-05 NOTE — PROGRESS NOTES
[facility]    Medicine Progress Note - Hospitalist Service       Date of Admission:  9/9/2020  Assessment & Plan       John Juárez is a 55 year old male admitted on 9/9/2020. PMHx of schizophrenia, hx of TBI, alcohol use disorder, COPD, hyperlipidemia and hypothyroidism who was admitted on 9/9/2020 for evaluation of aggressive behavior at his group home.    Was evaluated by Psychiatry and his condition stabilized, though his group home was unwilling to take him back and thus hospitalization has been prolonged awaiting new placement and guardianship. Under civil commitment through [facility].     Neurocognitive disorder dt hx of TBI and alcohol use disorder  Schizophrenia  Under commitment  Had been residing in group home prior to admission.  Brought to hospital for evaluation of aggressive behaviors. Group home now unwilling to take him back. Has had numerous CODE 21s called this stay. Seen by psych, meds adjusted. Is currently under civil commitment and court hold through [facility] until 7/31/21.   --Continue on Haldol 10mg TID (timed for when patient tends to be most agitated), memantine 5mg daily, venlafaxine 75mg daily, benztropine 1mg BID   -- prns available for agitation (including Haldol, Zyprexa and Ativan)  -- SW following for placement, placement will occur once guardianship is established     Possible Tardive Dyskinesia vs EPSE  Per psych assessment on 4/1/21, noted to have possible tardive dyskinesia vs extrapyramidal side effects of meds. At that time, recommended to increase Cogentin to 1mg BID and add vitamin E 800 international unit(s) daily.      Back cellulitis in 1/2021: Resolved  Noted on 1/24/21, initiated on cephalexin at that time. Received local wound care and completed 7-day course of antibiotics on 1/30     Baseline Hypotension  BP 90-110s systolic on average. Asymptomatic. No concerns     Hyperlipidemia  Chronic and stable on llow dose ASA and   atorvastatin     COPD  Not O2 dependent. Chronic and stable on salmeterol, albuterol prn     Hypothyroidism  Chronic and stable on levothyroxine     Tobacco use disorder  Using nicotine patch and PRN gum     Resting tremor of upper extremities  No acute issues      Hx of infective endocarditis with severe mitral and aortic regurgitation S/P bioprosthetic valve replacement (10/2015)  Systolic heart murmur  HFrEF, not in acute exacerbation: Follows with Dr. Dockery of [facility], last seen in 1/2020. Note heart murmur on exam, strongest in aortic region. No CP, SOB, dizziness, syncope.   * Echocardiogram in 5/2016 with EF 50%, no evidence for prosthetic mitral and aortic valve dysfunction.   - Follow-up with outpatient Cardiology upon dismissal from the hospital (on every 2 year follow-ups)   - Continue ASA      Hx of CVA: Note basal ganglion stroke in 2015. No focal neuro deficits aside from cognitive dysfunction as above.          Diet: Room Service  Diet  Combination Diet Regular Diet Adult; Safe Tray - with utensils  Snacks/Supplements Adult: Other; Bedtime snack; Between Meals  Room Service    DVT Prophylaxis: Pneumatic Compression Devices  Valentin Catheter: not present  Code Status: Full Code           Disposition Plan   Expected discharge: pending guardianship and placement  Entered: Sushil Genao MD 05/05/2021, 4:46 PM       The patient's care was discussed with the Patient.    Sushil Genao MD  Hospitalist Service  [facility]  Contact information available via [facility] Paging/Directory    ______________________________________________________________________    Interval History   He reports feeling well, denies any pain.  Appetite is good.  Offers no other complaints.     Data reviewed today: I reviewed all medications, new labs and imaging results over the last 24 hours. I personally reviewed no images or EKG's today.    Physical Exam   Vital Signs: Temp: 97.6  F (36.4   C) Temp src: Oral BP: 107/77 Pulse: 79   Resp: 16 SpO2: 96 % O2 Device: None (Room air)    Weight: 185 lbs 9.6 oz  General Appearance: Well nourished male in NAD  Respiratory: lungs CTAB, no wheezes or crackles no tachypnea  Cardiovascular: RRR, normal s1/s2 with grade 2/6 systolic murmur  GI: normal bowel sounds  Skin:   Other:      Data   No lab results found in last 7 days.

## 2021-05-05 NOTE — PLAN OF CARE
DATE & TIME: 5/5/21 3924-1151  Cognitive Concerns/ Orientation : Confused; disoriented to place, time, situation.  BEHAVIOR & AGGRESSION TOOL COLOR: Green, yellow at times  CIWA SCORE: NA  ABNL VS/O2: VSS on RA  MOBILITY: Ind in room  PAIN MANAGMENT: Denies pain  DIET: Reg; good appetite  BOWEL/BLADDER: Up to BR  ABNL LAB/BG:   DRAIN/DEVICES: None  TELEMETRY RHYTHM: NA  SKIN: Intact  TESTS/PROCEDURES: None  D/C DATE: Pending placement and guardianship  Discharge Barriers: Placement and guardianship  OTHER IMPORTANT INFO: PRN Nicotine patch and Ativan given X1 for repeated agitation and requests to go downstairs to smoke; PRN Zyprexa given X1

## 2021-05-05 NOTE — PLAN OF CARE
DATE & TIME: 5/5/21 2037-5572  Cognitive Concerns/ Orientation : Confused; disoriented to place, time, situation.  BEHAVIOR & AGGRESSION TOOL COLOR: Green, yellow at times  CIWA SCORE: NA  ABNL VS/O2: VSS on RA  MOBILITY: Ind in room  PAIN MANAGMENT: Denies pain  DIET: Reg; good appetite  BOWEL/BLADDER: Up to BR  ABNL LAB/BG:   DRAIN/DEVICES: None  TELEMETRY RHYTHM: NA  SKIN: Intact  TESTS/PROCEDURES: None  D/C DATE: Pending placement and guardianship  Discharge Barriers: Placement and guardianship  OTHER IMPORTANT INFO:

## 2021-05-06 PROCEDURE — 120N000001 HC R&B MED SURG/OB

## 2021-05-06 PROCEDURE — 99231 SBSQ HOSP IP/OBS SF/LOW 25: CPT | Performed by: HOSPITALIST

## 2021-05-06 PROCEDURE — 250N000013 HC RX MED GY IP 250 OP 250 PS 637: Performed by: INTERNAL MEDICINE

## 2021-05-06 PROCEDURE — 250N000013 HC RX MED GY IP 250 OP 250 PS 637: Performed by: HOSPITALIST

## 2021-05-06 PROCEDURE — 250N000013 HC RX MED GY IP 250 OP 250 PS 637: Performed by: PSYCHIATRY & NEUROLOGY

## 2021-05-06 RX ADMIN — VENLAFAXINE HYDROCHLORIDE 75 MG: 75 CAPSULE, EXTENDED RELEASE ORAL at 08:10

## 2021-05-06 RX ADMIN — SALMETEROL XINAFOATE 1 PUFF: 50 POWDER, METERED ORAL; RESPIRATORY (INHALATION) at 08:10

## 2021-05-06 RX ADMIN — ASPIRIN 81 MG: 81 TABLET, COATED ORAL at 08:10

## 2021-05-06 RX ADMIN — BENZTROPINE MESYLATE 1 MG: 0.5 TABLET ORAL at 21:49

## 2021-05-06 RX ADMIN — HALOPERIDOL 10 MG: 5 TABLET ORAL at 15:12

## 2021-05-06 RX ADMIN — ATORVASTATIN CALCIUM 80 MG: 40 TABLET, FILM COATED ORAL at 21:49

## 2021-05-06 RX ADMIN — LEVOTHYROXINE SODIUM 88 MCG: 88 TABLET ORAL at 08:09

## 2021-05-06 RX ADMIN — DOCUSATE SODIUM 100 MG: 100 CAPSULE, LIQUID FILLED ORAL at 08:10

## 2021-05-06 RX ADMIN — LORAZEPAM 2 MG: 1 TABLET ORAL at 10:41

## 2021-05-06 RX ADMIN — BENZTROPINE MESYLATE 1 MG: 0.5 TABLET ORAL at 08:09

## 2021-05-06 RX ADMIN — HALOPERIDOL 10 MG: 5 TABLET ORAL at 08:10

## 2021-05-06 RX ADMIN — HALOPERIDOL 10 MG: 5 TABLET ORAL at 21:48

## 2021-05-06 RX ADMIN — DOCUSATE SODIUM 100 MG: 100 CAPSULE, LIQUID FILLED ORAL at 21:49

## 2021-05-06 RX ADMIN — SALMETEROL XINAFOATE 1 PUFF: 50 POWDER, METERED ORAL; RESPIRATORY (INHALATION) at 21:49

## 2021-05-06 RX ADMIN — MEMANTINE 5 MG: 5 TABLET ORAL at 08:10

## 2021-05-06 RX ADMIN — Medication 800 UNITS: at 08:10

## 2021-05-06 ASSESSMENT — ACTIVITIES OF DAILY LIVING (ADL)
ADLS_ACUITY_SCORE: 13

## 2021-05-06 NOTE — PLAN OF CARE
DATE & TIME: 5/6/21 4247-1993  Cognitive Concerns/ Orientation: Confused; alert to self only.   BEHAVIOR & AGGRESSION TOOL COLOR: Green, yellow at times  CIWA SCORE: n/a  ABNL VS/O2: VSS on RA  MOBILITY: Ind in room  PAIN MANAGMENT: Denies pain  DIET: Reg; good appetite  BOWEL/BLADDER: Continent, up to BR  ABNL LAB/BG: n/a  DRAIN/DEVICES: None  TELEMETRY RHYTHM: n/a  SKIN: Intact  TESTS/PROCEDURES: None  D/C DATE: Pending placement and guardianship  Discharge Barriers: Placement and guardianship  OTHER IMPORTANT INFO: Pt was calm and cooperative through the shift.

## 2021-05-06 NOTE — PLAN OF CARE
DATE & TIME: 5/6/21 9368-1462  Cognitive Concerns/ Orientation: Confused; alert to self only.   BEHAVIOR & AGGRESSION TOOL COLOR: Green, yellow at times  CIWA SCORE: n/a  ABNL VS/O2: VSS on RA  MOBILITY: Ind in room  PAIN MANAGMENT: Denies pain  DIET: Reg; good appetite  BOWEL/BLADDER: Continent, up to BR  ABNL LAB/BG: n/a  DRAIN/DEVICES: None  TELEMETRY RHYTHM: n/a  SKIN: Intact  TESTS/PROCEDURES: None  D/C DATE: Pending placement and guardianship  Discharge Barriers: Placement and guardianship  OTHER IMPORTANT INFO: Pt has shared many delusions, today. We ambulated in the cruz several times.

## 2021-05-06 NOTE — PROGRESS NOTES
[facility]    Medicine Progress Note - Hospitalist Service       Date of Admission:  9/9/2020  Assessment & Plan       John Juárez is a 55 year old male admitted on 9/9/2020. PMHx of schizophrenia, hx of TBI, alcohol use disorder, COPD, hyperlipidemia and hypothyroidism who was admitted on 9/9/2020 for evaluation of aggressive behavior at his group home.    Was evaluated by Psychiatry and his condition stabilized, though his group home was unwilling to take him back and thus hospitalization has been prolonged awaiting new placement and guardianship. Under civil commitment through [facility].     Neurocognitive disorder dt hx of TBI and alcohol use disorder  Schizophrenia  Under commitment  Had been residing in group home prior to admission.  Brought to hospital for evaluation of aggressive behaviors. Group home now unwilling to take him back. Has had numerous CODE 21s called this stay. Seen by psych, meds adjusted. Is currently under civil commitment and court hold through [facility] until 7/31/21.   --Continue on Haldol 10mg TID (timed for when patient tends to be most agitated), memantine 5mg daily, venlafaxine 75mg daily, benztropine 1mg BID   -- prns available for agitation (including Haldol, Zyprexa and Ativan)  -- SW following for placement, placement will occur once guardianship is established     Possible Tardive Dyskinesia vs EPSE  Per psych assessment on 4/1/21, noted to have possible tardive dyskinesia vs extrapyramidal side effects of meds. At that time, recommended to increase Cogentin to 1mg BID and add vitamin E 800 international unit(s) daily.      Back cellulitis in 1/2021: Resolved  Noted on 1/24/21, initiated on cephalexin at that time. Received local wound care and completed 7-day course of antibiotics on 1/30     Baseline Hypotension  BP 90-110s systolic on average. Asymptomatic. No concerns     Hyperlipidemia  Chronic and stable on llow dose ASA and   atorvastatin     COPD  Not O2 dependent. Chronic and stable on salmeterol, albuterol prn     Hypothyroidism  Chronic and stable on levothyroxine     Tobacco use disorder  Using nicotine patch and PRN gum     Resting tremor of upper extremities  No acute issues      Hx of infective endocarditis with severe mitral and aortic regurgitation S/P bioprosthetic valve replacement (10/2015)  Systolic heart murmur  HFrEF, not in acute exacerbation: Follows with Dr. Dockery of [facility], last seen in 1/2020. Note heart murmur on exam, strongest in aortic region. No CP, SOB, dizziness, syncope.   * Echocardiogram in 5/2016 with EF 50%, no evidence for prosthetic mitral and aortic valve dysfunction.   - Follow-up with outpatient Cardiology upon dismissal from the hospital (on every 2 year follow-ups)   - Continue ASA      Hx of CVA: Note basal ganglion stroke in 2015. No focal neuro deficits aside from cognitive dysfunction as above.          Diet: Room Service  Diet  Combination Diet Regular Diet Adult; Safe Tray - with utensils  Snacks/Supplements Adult: Other; Bedtime snack; Between Meals  Room Service    DVT Prophylaxis: Pneumatic Compression Devices  Valentin Catheter: not present  Code Status: Full Code           Disposition Plan   Expected discharge: pending guardianship and placement  Entered: Sushil Genao MD 05/06/2021, 3:48 PM       The patient's care was discussed with the Patient.    Sushil Genao MD  Hospitalist Service  [facility]  Contact information available via [facility] Paging/Directory    ______________________________________________________________________    Interval History   Denies any dyspnea or pain, tolerating diet.  Offers no complaints.     Data reviewed today: I reviewed all medications, new labs and imaging results over the last 24 hours. I personally reviewed no images or EKG's today.    Physical Exam   Vital Signs: Temp: 98.2  F (36.8  C) Temp src: Oral  BP: 104/69 Pulse: 86   Resp: 16 SpO2: 96 % O2 Device: None (Room air)    Weight: 185 lbs 9.6 oz  General Appearance: Well nourished male in NAD  Respiratory: respirations non-labored on room air  Cardiovascular:   GI:   Skin:   Other:  alert and appropriate, cranial nerves grossly intact    Data   No lab results found in last 7 days.

## 2021-05-06 NOTE — PLAN OF CARE
DATE & TIME: 5/5/21 4468-3103  Cognitive Concerns/ Orientation: Confused; alert to self only.   BEHAVIOR & AGGRESSION TOOL COLOR: Green, yellow at times  CIWA SCORE: n/a  ABNL VS/O2: VSS on RA  MOBILITY: Ind in room  PAIN MANAGMENT: Denies pain  DIET: Reg; good appetite  BOWEL/BLADDER: Continent, up to BR  ABNL LAB/BG: n/a  DRAIN/DEVICES: None  TELEMETRY RHYTHM: n/a  SKIN: Intact  TESTS/PROCEDURES: None  D/C DATE: Pending placement and guardianship  Discharge Barriers: Placement and guardianship  OTHER IMPORTANT INFO:

## 2021-05-07 PROCEDURE — 99231 SBSQ HOSP IP/OBS SF/LOW 25: CPT | Performed by: HOSPITALIST

## 2021-05-07 PROCEDURE — 120N000001 HC R&B MED SURG/OB

## 2021-05-07 PROCEDURE — 250N000013 HC RX MED GY IP 250 OP 250 PS 637: Performed by: HOSPITALIST

## 2021-05-07 PROCEDURE — 250N000013 HC RX MED GY IP 250 OP 250 PS 637: Performed by: INTERNAL MEDICINE

## 2021-05-07 PROCEDURE — 250N000013 HC RX MED GY IP 250 OP 250 PS 637: Performed by: PSYCHIATRY & NEUROLOGY

## 2021-05-07 RX ADMIN — SALMETEROL XINAFOATE 1 PUFF: 50 POWDER, METERED ORAL; RESPIRATORY (INHALATION) at 08:40

## 2021-05-07 RX ADMIN — HALOPERIDOL 10 MG: 5 TABLET ORAL at 16:33

## 2021-05-07 RX ADMIN — DOCUSATE SODIUM 100 MG: 100 CAPSULE, LIQUID FILLED ORAL at 08:06

## 2021-05-07 RX ADMIN — BENZTROPINE MESYLATE 1 MG: 0.5 TABLET ORAL at 08:06

## 2021-05-07 RX ADMIN — LORAZEPAM 2 MG: 1 TABLET ORAL at 12:00

## 2021-05-07 RX ADMIN — VENLAFAXINE HYDROCHLORIDE 75 MG: 75 CAPSULE, EXTENDED RELEASE ORAL at 08:06

## 2021-05-07 RX ADMIN — HALOPERIDOL 10 MG: 5 TABLET ORAL at 21:01

## 2021-05-07 RX ADMIN — ASPIRIN 81 MG: 81 TABLET, COATED ORAL at 08:06

## 2021-05-07 RX ADMIN — BENZTROPINE MESYLATE 1 MG: 0.5 TABLET ORAL at 21:01

## 2021-05-07 RX ADMIN — HALOPERIDOL 10 MG: 5 TABLET ORAL at 08:06

## 2021-05-07 RX ADMIN — ATORVASTATIN CALCIUM 80 MG: 40 TABLET, FILM COATED ORAL at 21:01

## 2021-05-07 RX ADMIN — Medication 800 UNITS: at 08:06

## 2021-05-07 RX ADMIN — MEMANTINE 5 MG: 5 TABLET ORAL at 08:06

## 2021-05-07 RX ADMIN — LEVOTHYROXINE SODIUM 88 MCG: 88 TABLET ORAL at 08:06

## 2021-05-07 RX ADMIN — DOCUSATE SODIUM 100 MG: 100 CAPSULE, LIQUID FILLED ORAL at 21:01

## 2021-05-07 ASSESSMENT — ACTIVITIES OF DAILY LIVING (ADL)
ADLS_ACUITY_SCORE: 13

## 2021-05-07 NOTE — PROGRESS NOTES
Care Management Follow Up    Length of Stay (days): 230    Expected Discharge Date: 05/10/21     Concerns to be Addressed: patient refuses services, discharge planning     Patient plan of care discussed at interdisciplinary rounds: Yes    Anticipated Discharge Disposition: Other (Comments)     Anticipated Discharge Services: None  Anticipated Discharge DME: None    Patient/family educated on Medicare website which has current facility and service quality ratings: (not applicable)  Education Provided on the Discharge Plan:    Patient/Family in Agreement with the Plan: no    Referrals Placed by CM/SW: Post Acute Facilities  Private pay costs discussed: Not applicable    Additional Information:  Social Security has granted Anderson Gray to be patient's Representative Payee.  Ms Gray will be able to distribute patient's monthly income toward the cost of his care in his new facility.    On 4/29,John's siblings and mother were sent written notice of the guardianship petition and they were requested to contact this writer if they are interested in becoming John's guardian.  As of 5/7, writer has not received any call from the family members.    On Tuesday, 5/11, writer will be speaking with the  for an update on the progress of the guardianship petition.        ASIM CastilloSW

## 2021-05-07 NOTE — PROGRESS NOTES
[facility]    Medicine Progress Note - Hospitalist Service       Date of Admission:  9/9/2020  Assessment & Plan       John Juárez is a 55 year old male admitted on 9/9/2020. PMHx of schizophrenia, hx of TBI, alcohol use disorder, COPD, hyperlipidemia and hypothyroidism who was admitted on 9/9/2020 for evaluation of aggressive behavior at his group home.    Was evaluated by Psychiatry and his condition stabilized, though his group home was unwilling to take him back and thus hospitalization has been prolonged awaiting new placement and guardianship. Under civil commitment through [facility].     Neurocognitive disorder dt hx of TBI and alcohol use disorder  Schizophrenia  Under commitment  Had been residing in group home prior to admission.  Brought to hospital for evaluation of aggressive behaviors. Group home now unwilling to take him back. Has had numerous CODE 21s called this stay. Seen by psych, meds adjusted. Is currently under civil commitment and court hold through [facility] until 7/31/21.   --Continue on Haldol 10mg TID (timed for when patient tends to be most agitated), memantine 5mg daily, venlafaxine 75mg daily, benztropine 1mg BID   -- prns available for agitation (including Haldol, Zyprexa and Ativan)  -- SW following for placement, placement will occur once guardianship is established     Possible Tardive Dyskinesia vs EPSE  Per psych assessment on 4/1/21, noted to have possible tardive dyskinesia vs extrapyramidal side effects of meds. At that time, recommended to increase Cogentin to 1mg BID and add vitamin E 800 international unit(s) daily.      Back cellulitis in 1/2021: Resolved  Noted on 1/24/21, initiated on cephalexin at that time. Received local wound care and completed 7-day course of antibiotics on 1/30     Baseline Hypotension  BP 90-110s systolic on average. Asymptomatic. No concerns     Hyperlipidemia  Chronic and stable on llow dose ASA and   atorvastatin     COPD  Not O2 dependent. Chronic and stable on salmeterol, albuterol prn     Hypothyroidism  Chronic and stable on levothyroxine     Tobacco use disorder  Using nicotine patch and PRN gum     Resting tremor of upper extremities  No acute issues      Hx of infective endocarditis with severe mitral and aortic regurgitation S/P bioprosthetic valve replacement (10/2015)  Systolic heart murmur  HFrEF, not in acute exacerbation: Follows with Dr. Dockery of [facility], last seen in 1/2020. Note heart murmur on exam, strongest in aortic region. No CP, SOB, dizziness, syncope.   * Echocardiogram in 5/2016 with EF 50%, no evidence for prosthetic mitral and aortic valve dysfunction.   - Follow-up with outpatient Cardiology upon dismissal from the hospital (on every 2 year follow-ups)   - Continue ASA      Hx of CVA: Note basal ganglion stroke in 2015. No focal neuro deficits aside from cognitive dysfunction as above.          Diet: Room Service  Diet  Combination Diet Regular Diet Adult; Safe Tray - with utensils  Snacks/Supplements Adult: Other; Bedtime snack; Between Meals  Room Service    DVT Prophylaxis: Pneumatic Compression Devices  Valentin Catheter: not present  Code Status: Full Code           Disposition Plan   Expected discharge: pending guardianship and placement  Entered: Sushil Genao MD 05/07/2021, 12:51 PM       The patient's care was discussed with the Patient.    Sushil Genao MD  Hospitalist Service  [facility]  Contact information available via [facility] Paging/Directory    ______________________________________________________________________    Interval History   He denies any pain.  Appetite is good.  Offers no complaints other than asking why he is still here.     Data reviewed today: I reviewed all medications, new labs and imaging results over the last 24 hours. I personally reviewed no images or EKG's today.    Physical Exam   Vital Signs:     BP:  (!) 138/92 Pulse: 83   Resp: 18 SpO2: 98 % O2 Device: None (Room air)    Weight: 185 lbs 9.6 oz  General Appearance: Well nourished male in NAD, lying in bed  Respiratory: respirations non-labored on room air  Cardiovascular:   GI:   Skin:   Other:  alert, cranial nerves grossly intact    Data   No lab results found in last 7 days.

## 2021-05-07 NOTE — PLAN OF CARE
DATE & TIME: 5/6/21 1900-0730  Cognitive Concerns/ Orientation: Confused; alert to self only.   BEHAVIOR & AGGRESSION TOOL COLOR: Green, yellow at times  CIWA SCORE: n/a  ABNL VS/O2: VSS on RA  MOBILITY: Ind in room  PAIN MANAGMENT: Denies pain  DIET: Reg; good appetite  BOWEL/BLADDER: Continent, up to BR  ABNL LAB/BG: n/a  DRAIN/DEVICES: None  TELEMETRY RHYTHM: n/a  SKIN: Intact  TESTS/PROCEDURES: None  D/C DATE: Pending placement and guardianship  Discharge Barriers: Placement and guardianship  OTHER IMPORTANT INFO: Pt was calm and cooperative through the shift.

## 2021-05-07 NOTE — PLAN OF CARE
Cognitive Concerns/ Orientation: Confused; alert to self only.   BEHAVIOR & AGGRESSION TOOL COLOR: Green, yellow at times  CIWA SCORE: n/a  ABNL VS/O2: VSS on RA  MOBILITY: Ind in room  PAIN MANAGMENT: Denies pain  DIET: Reg; good appetite  BOWEL/BLADDER: Continent, up to BR  ABNL LAB/BG: n/a  DRAIN/DEVICES: None  TELEMETRY RHYTHM: n/a  SKIN: Intact  TESTS/PROCEDURES: None  D/C DATE: Pending placement and guardianship  Discharge Barriers: Placement and guardianship  OTHER IMPORTANT INFO: Ativan x1 for mild agitation; pt calm and cooperative rest of shift.

## 2021-05-08 PROCEDURE — 250N000013 HC RX MED GY IP 250 OP 250 PS 637: Performed by: HOSPITALIST

## 2021-05-08 PROCEDURE — 250N000013 HC RX MED GY IP 250 OP 250 PS 637: Performed by: INTERNAL MEDICINE

## 2021-05-08 PROCEDURE — 250N000013 HC RX MED GY IP 250 OP 250 PS 637: Performed by: PSYCHIATRY & NEUROLOGY

## 2021-05-08 PROCEDURE — 99231 SBSQ HOSP IP/OBS SF/LOW 25: CPT | Performed by: HOSPITALIST

## 2021-05-08 PROCEDURE — 120N000001 HC R&B MED SURG/OB

## 2021-05-08 RX ADMIN — SALMETEROL XINAFOATE 1 PUFF: 50 POWDER, METERED ORAL; RESPIRATORY (INHALATION) at 20:23

## 2021-05-08 RX ADMIN — BENZTROPINE MESYLATE 1 MG: 0.5 TABLET ORAL at 09:45

## 2021-05-08 RX ADMIN — ATORVASTATIN CALCIUM 80 MG: 40 TABLET, FILM COATED ORAL at 20:23

## 2021-05-08 RX ADMIN — DOCUSATE SODIUM 100 MG: 100 CAPSULE, LIQUID FILLED ORAL at 09:45

## 2021-05-08 RX ADMIN — VENLAFAXINE HYDROCHLORIDE 75 MG: 75 CAPSULE, EXTENDED RELEASE ORAL at 09:46

## 2021-05-08 RX ADMIN — BENZTROPINE MESYLATE 1 MG: 0.5 TABLET ORAL at 20:23

## 2021-05-08 RX ADMIN — MEMANTINE 5 MG: 5 TABLET ORAL at 09:46

## 2021-05-08 RX ADMIN — HALOPERIDOL 10 MG: 5 TABLET ORAL at 20:23

## 2021-05-08 RX ADMIN — Medication 800 UNITS: at 09:45

## 2021-05-08 RX ADMIN — HALOPERIDOL 10 MG: 5 TABLET ORAL at 16:01

## 2021-05-08 RX ADMIN — LEVOTHYROXINE SODIUM 88 MCG: 88 TABLET ORAL at 09:46

## 2021-05-08 RX ADMIN — SALMETEROL XINAFOATE 1 PUFF: 50 POWDER, METERED ORAL; RESPIRATORY (INHALATION) at 09:50

## 2021-05-08 RX ADMIN — ASPIRIN 81 MG: 81 TABLET, COATED ORAL at 09:45

## 2021-05-08 RX ADMIN — HALOPERIDOL 10 MG: 5 TABLET ORAL at 09:46

## 2021-05-08 RX ADMIN — DOCUSATE SODIUM 100 MG: 100 CAPSULE, LIQUID FILLED ORAL at 20:23

## 2021-05-08 ASSESSMENT — ACTIVITIES OF DAILY LIVING (ADL)
ADLS_ACUITY_SCORE: 13

## 2021-05-08 NOTE — PLAN OF CARE
DATE & TIME: 5/7/2021; 4250-3097    Cognitive Concerns/ Orientation : A & O x 1 (self only), forgetful, confused  BEHAVIOR & AGGRESSION TOOL COLOR: Green  CIWA SCORE: NA    ABNL VS/O2: VSS on RA, daily vitals  MOBILITY: independent in room, SBA in halls   PAIN MANAGMENT: denies   DIET: Regular, tolerating well   BOWEL/BLADDER: BS active x 4, continent   ABNL LAB/BG: NA   DRAIN/DEVICES: NA   TELEMETRY RHYTHM: NA   SKIN: pale, redness to knees and back   TESTS/PROCEDURES: NA   D/C DATE: pending  Discharge Barriers: placement and guardianship   OTHER IMPORTANT INFO: calm and cooperative most of shirt, easily re-directable, resting most of shift

## 2021-05-08 NOTE — PLAN OF CARE
DATE & TIME: 5/7/2021; 7340-2612  Cognitive Concerns/ Orientation : A & O x 1 (self only), forgetful, confused  BEHAVIOR & AGGRESSION TOOL COLOR: Green  CIWA SCORE: NA    ABNL VS/O2: VSS on RA, daily vitals  MOBILITY: independent in room, SBA in halls   PAIN MANAGMENT: denies   DIET: Regular, tolerating well   BOWEL/BLADDER: BS active x 4, continent   ABNL LAB/BG: NA   DRAIN/DEVICES: NA   TELEMETRY RHYTHM: NA   SKIN: pale, redness to knees and back   TESTS/PROCEDURES: NA   D/C DATE: pending  Discharge Barriers: placement and guardianship   OTHER IMPORTANT INFO: calm and cooperative most of shirt, easily re-directable, resting most of shift

## 2021-05-08 NOTE — PROGRESS NOTES
[facility]    Medicine Progress Note - Hospitalist Service       Date of Admission:  9/9/2020  Assessment & Plan       John Juárez is a 55 year old male admitted on 9/9/2020. PMHx of schizophrenia, hx of TBI, alcohol use disorder, COPD, hyperlipidemia and hypothyroidism who was admitted on 9/9/2020 for evaluation of aggressive behavior at his group home.    Was evaluated by Psychiatry and his condition stabilized, though his group home was unwilling to take him back and thus hospitalization has been prolonged awaiting new placement and guardianship. Under civil commitment through [facility].     Neurocognitive disorder dt hx of TBI and alcohol use disorder  Schizophrenia  Under commitment  Had been residing in group home prior to admission.  Brought to hospital for evaluation of aggressive behaviors. Group home now unwilling to take him back. Has had numerous CODE 21s called this stay. Seen by psych, meds adjusted. Is currently under civil commitment and court hold through [facility] until 7/31/21.   --Continue on Haldol 10mg TID (timed for when patient tends to be most agitated), memantine 5mg daily, venlafaxine 75mg daily, benztropine 1mg BID   -- prns available for agitation (including Haldol, Zyprexa and Ativan)  -- SW following for placement, placement will occur once guardianship is established     Possible Tardive Dyskinesia vs EPSE  Per psych assessment on 4/1/21, noted to have possible tardive dyskinesia vs extrapyramidal side effects of meds. At that time, recommended to increase Cogentin to 1mg BID and add vitamin E 800 international unit(s) daily.      Back cellulitis in 1/2021: Resolved  Noted on 1/24/21, initiated on cephalexin at that time. Received local wound care and completed 7-day course of antibiotics on 1/30     Baseline Hypotension  BP 90-110s systolic on average. Asymptomatic. No concerns     Hyperlipidemia  Chronic and stable on llow dose ASA and   atorvastatin     COPD  Not O2 dependent. Chronic and stable on salmeterol, albuterol prn     Hypothyroidism  Chronic and stable on levothyroxine     Tobacco use disorder  Using nicotine patch and PRN gum     Resting tremor of upper extremities  No acute issues      Hx of infective endocarditis with severe mitral and aortic regurgitation S/P bioprosthetic valve replacement (10/2015)  Systolic heart murmur  HFrEF, not in acute exacerbation: Follows with Dr. Dockery of [facility], last seen in 1/2020. Note heart murmur on exam, strongest in aortic region. No CP, SOB, dizziness, syncope.   * Echocardiogram in 5/2016 with EF 50%, no evidence for prosthetic mitral and aortic valve dysfunction.   - Follow-up with outpatient Cardiology upon dismissal from the hospital (on every 2 year follow-ups)   - Continue ASA      Hx of CVA: Note basal ganglion stroke in 2015. No focal neuro deficits aside from cognitive dysfunction as above.          Diet: Room Service  Diet  Combination Diet Regular Diet Adult; Safe Tray - with utensils  Snacks/Supplements Adult: Other; Bedtime snack; Between Meals  Room Service    DVT Prophylaxis: Pneumatic Compression Devices  Valentin Catheter: not present  Code Status: Full Code           Disposition Plan   Expected discharge: pending guardianship and placement  Entered: Ashtyn Barrientos MD 05/08/2021, 2:05 PM       The patient's care was discussed with the Patient.    Ashtyn Barrientos MD  Hospitalist Service  [facility]  Contact information available via [facility] Paging/Directory    ______________________________________________________________________    Interval History   He denies any pain.  Stable overnight.  Asked about discharge plans.  Did not voice any concerns.    Data reviewed today: I reviewed all medications, new labs and imaging results over the last 24 hours. I personally reviewed no images or EKG's today.    Physical Exam   Vital Signs: Temp:  97.5  F (36.4  C) Temp src: Oral BP: 125/89 Pulse: 94     SpO2: 96 % O2 Device: None (Room air)    Weight: 185 lbs 9.6 oz  General Appearance: Well nourished male in NAD, lying in bed  Respiratory: respirations non-labored on room air  Cardiovascular:   GI:   Skin:   Other:  alert, conversant    Data   No lab results found in last 7 days.

## 2021-05-09 PROCEDURE — 250N000013 HC RX MED GY IP 250 OP 250 PS 637: Performed by: INTERNAL MEDICINE

## 2021-05-09 PROCEDURE — 250N000013 HC RX MED GY IP 250 OP 250 PS 637: Performed by: PSYCHIATRY & NEUROLOGY

## 2021-05-09 PROCEDURE — 99207 PR NO CHARGE LOS: CPT | Performed by: NURSE PRACTITIONER

## 2021-05-09 PROCEDURE — 99231 SBSQ HOSP IP/OBS SF/LOW 25: CPT | Performed by: HOSPITALIST

## 2021-05-09 PROCEDURE — 250N000013 HC RX MED GY IP 250 OP 250 PS 637: Performed by: HOSPITALIST

## 2021-05-09 PROCEDURE — 250N000013 HC RX MED GY IP 250 OP 250 PS 637: Performed by: NURSE PRACTITIONER

## 2021-05-09 PROCEDURE — 250N000011 HC RX IP 250 OP 636: Performed by: NURSE PRACTITIONER

## 2021-05-09 PROCEDURE — 120N000001 HC R&B MED SURG/OB

## 2021-05-09 RX ORDER — LORAZEPAM 2 MG/ML
2 INJECTION INTRAMUSCULAR ONCE
Status: COMPLETED | OUTPATIENT
Start: 2021-05-09 | End: 2021-05-09

## 2021-05-09 RX ORDER — OLANZAPINE 5 MG/1
5-10 TABLET, ORALLY DISINTEGRATING ORAL 3 TIMES DAILY PRN
Status: DISCONTINUED | OUTPATIENT
Start: 2021-05-09 | End: 2021-05-26

## 2021-05-09 RX ORDER — DIPHENHYDRAMINE HYDROCHLORIDE 50 MG/ML
50 INJECTION INTRAMUSCULAR; INTRAVENOUS ONCE
Status: COMPLETED | OUTPATIENT
Start: 2021-05-09 | End: 2021-05-09

## 2021-05-09 RX ADMIN — LORAZEPAM 2 MG: 1 TABLET ORAL at 07:58

## 2021-05-09 RX ADMIN — VENLAFAXINE HYDROCHLORIDE 75 MG: 75 CAPSULE, EXTENDED RELEASE ORAL at 07:59

## 2021-05-09 RX ADMIN — MEMANTINE 5 MG: 5 TABLET ORAL at 07:59

## 2021-05-09 RX ADMIN — HALOPERIDOL 10 MG: 5 TABLET ORAL at 07:58

## 2021-05-09 RX ADMIN — LORAZEPAM 2 MG: 1 TABLET ORAL at 18:24

## 2021-05-09 RX ADMIN — OLANZAPINE 10 MG: 5 TABLET, ORALLY DISINTEGRATING ORAL at 10:40

## 2021-05-09 RX ADMIN — DIPHENHYDRAMINE HYDROCHLORIDE 50 MG: 50 INJECTION, SOLUTION INTRAMUSCULAR; INTRAVENOUS at 19:20

## 2021-05-09 RX ADMIN — DOCUSATE SODIUM 100 MG: 100 CAPSULE, LIQUID FILLED ORAL at 07:58

## 2021-05-09 RX ADMIN — NICOTINE POLACRILEX 2 MG: 2 GUM, CHEWING ORAL at 10:45

## 2021-05-09 RX ADMIN — BENZTROPINE MESYLATE 1 MG: 0.5 TABLET ORAL at 08:09

## 2021-05-09 RX ADMIN — ASPIRIN 81 MG: 81 TABLET, COATED ORAL at 07:58

## 2021-05-09 RX ADMIN — Medication 800 UNITS: at 07:59

## 2021-05-09 RX ADMIN — LEVOTHYROXINE SODIUM 88 MCG: 88 TABLET ORAL at 07:58

## 2021-05-09 RX ADMIN — OLANZAPINE 10 MG: 5 TABLET, ORALLY DISINTEGRATING ORAL at 18:36

## 2021-05-09 RX ADMIN — HALOPERIDOL 5 MG: 5 TABLET ORAL at 17:45

## 2021-05-09 RX ADMIN — SALMETEROL XINAFOATE 1 PUFF: 50 POWDER, METERED ORAL; RESPIRATORY (INHALATION) at 08:00

## 2021-05-09 RX ADMIN — HALOPERIDOL 10 MG: 5 TABLET ORAL at 14:49

## 2021-05-09 RX ADMIN — LORAZEPAM 2 MG: 2 INJECTION INTRAMUSCULAR; INTRAVENOUS at 19:21

## 2021-05-09 ASSESSMENT — ACTIVITIES OF DAILY LIVING (ADL)
ADLS_ACUITY_SCORE: 13

## 2021-05-09 NOTE — PROGRESS NOTES
[facility]    Medicine Progress Note - Hospitalist Service       Date of Admission:  9/9/2020  Assessment & Plan       John Juárez is a 55 year old male admitted on 9/9/2020. PMHx of schizophrenia, hx of TBI, alcohol use disorder, COPD, hyperlipidemia and hypothyroidism who was admitted on 9/9/2020 for evaluation of aggressive behavior at his group home.    Was evaluated by Psychiatry and his condition stabilized, though his group home was unwilling to take him back and thus hospitalization has been prolonged awaiting new placement and guardianship. Under civil commitment through [facility].     Neurocognitive disorder dt hx of TBI and alcohol use disorder  Schizophrenia  Under commitment  Had been residing in group home prior to admission.  Brought to hospital for evaluation of aggressive behaviors. Group home now unwilling to take him back. Has had numerous CODE 21s called this stay. Seen by psych, meds adjusted. Is currently under civil commitment and court hold through [facility] until 7/31/21.   --Continue on Haldol 10mg TID (timed for when patient tends to be most agitated), memantine 5mg daily, venlafaxine 75mg daily, benztropine 1mg BID   -- prns available for agitation (including Haldol, Zyprexa and Ativan)  -- SW following for placement, placement will occur once guardianship is established     Possible Tardive Dyskinesia vs EPSE  Per psych assessment on 4/1/21, noted to have possible tardive dyskinesia vs extrapyramidal side effects of meds. At that time, recommended to increase Cogentin to 1mg BID and add vitamin E 800 international unit(s) daily.      Back cellulitis in 1/2021: Resolved  Noted on 1/24/21, initiated on cephalexin at that time. Received local wound care and completed 7-day course of antibiotics on 1/30     Baseline Hypotension  BP 90-110s systolic on average. Asymptomatic. No concerns     Hyperlipidemia  Chronic and stable on llow dose ASA and   atorvastatin     COPD  Not O2 dependent. Chronic and stable on salmeterol, albuterol prn     Hypothyroidism  Chronic and stable on levothyroxine     Tobacco use disorder  Using nicotine patch and PRN gum     Resting tremor of upper extremities  No acute issues      Hx of infective endocarditis with severe mitral and aortic regurgitation S/P bioprosthetic valve replacement (10/2015)  Systolic heart murmur  HFrEF, not in acute exacerbation: Follows with Dr. Dockery of [facility], last seen in 1/2020. Note heart murmur on exam, strongest in aortic region. No CP, SOB, dizziness, syncope.   * Echocardiogram in 5/2016 with EF 50%, no evidence for prosthetic mitral and aortic valve dysfunction.   - Follow-up with outpatient Cardiology upon dismissal from the hospital (on every 2 year follow-ups)   - Continue ASA      Hx of CVA: Note basal ganglion stroke in 2015. No focal neuro deficits aside from cognitive dysfunction as above.          Diet: Room Service  Diet  Combination Diet Regular Diet Adult; Safe Tray - with utensils  Snacks/Supplements Adult: Other; Bedtime snack; Between Meals  Room Service    DVT Prophylaxis: Pneumatic Compression Devices  Valentin Catheter: not present  Code Status: Full Code           Disposition Plan   Expected discharge: pending guardianship and placement  Entered: Ashtyn Barrientos MD 05/09/2021, 1:40 PM       The patient's care was discussed with the Patient.    Ashtyn Barrientos MD  Hospitalist Service  [facility]  Contact information available via [facility] Paging/Directory    ______________________________________________________________________    Interval History    Stable overnight.  No concerns noted on chart review.  He was sitting and eating breakfast when seen.  Denied any complaints.    Data reviewed today: I reviewed all medications, new labs and imaging results over the last 24 hours. I personally reviewed no images or EKG's today.    Physical  Exam   Vital Signs: Temp: 98.2  F (36.8  C) Temp src: Oral BP: 125/88 Pulse: 76   Resp: 18 SpO2: 97 % O2 Device: None (Room air)    Weight: 185 lbs 9.6 oz  General Appearance: Well nourished male in NAD, lying in bed  Respiratory: respirations non-labored on room air  Cardiovascular:   GI:   Skin:   Other:  alert, conversant    Data   No lab results found in last 7 days.

## 2021-05-09 NOTE — PLAN OF CARE
DATE & TIME: 5/9/2021; 8589-9657  Cognitive Concerns/ Orientation : A&O to self, forgetful, confused, anxious and restless today  BEHAVIOR & AGGRESSION TOOL COLOR: Green  CIWA SCORE: NA    ABNL VS/O2: VSS on RA, daily vitals  MOBILITY: independent in room, SBA in halls   PAIN MANAGMENT: denies   DIET: Regular, tolerating well   BOWEL/BLADDER: BS active x 4, continent   ABNL LAB/BG: NA   DRAIN/DEVICES: NA   TELEMETRY RHYTHM: NA   SKIN: pale, redness to knees and back   TESTS/PROCEDURES: NA   D/C DATE: pending  Discharge Barriers: placement and guardianship   OTHER IMPORTANT INFO: early am restless/agitated:prn ativanX1 and prn zyprexaX1, re-directable.  Code 21 at approx 18:20:  Pt agitated, threatening staff, verbally abusive, pacing, attempts to leave, hallucinating.

## 2021-05-09 NOTE — PLAN OF CARE
DATE & TIME: 5/8/2021; 6463-2867  Cognitive Concerns/ Orientation : A & O x 1 (self only), forgetful, confused, calm and cooperative  BEHAVIOR & AGGRESSION TOOL COLOR: Green  CIWA SCORE: NA    ABNL VS/O2: VSS on RA, daily vitals  MOBILITY: independent in room, SBA in halls   PAIN MANAGMENT: denies   DIET: Regular, tolerating well   BOWEL/BLADDER: BS active x 4, continent   ABNL LAB/BG: NA   DRAIN/DEVICES: NA   TELEMETRY RHYTHM: NA   SKIN: pale, redness to knees and back   TESTS/PROCEDURES: NA   D/C DATE: pending  Discharge Barriers: placement and guardianship   OTHER IMPORTANT INFO: calm and cooperative most of shirt, easily re-directable, resting most of shift

## 2021-05-09 NOTE — PROGRESS NOTES
Code 21 at approx 18:20:  Pt agitated, threatening staff, verbally abusive, pacing, attempts to leave, hallucinating.

## 2021-05-09 NOTE — PLAN OF CARE
DATE & TIME: 5/8/2021; 1900-0730  Cognitive Concerns/ Orientation : A&O to self, forgetful, confused, calm and cooperative  BEHAVIOR & AGGRESSION TOOL COLOR: Green  CIWA SCORE: NA    ABNL VS/O2: VSS on RA, daily vitals  MOBILITY: independent in room, SBA in halls   PAIN MANAGMENT: denies   DIET: Regular, tolerating well   BOWEL/BLADDER: BS active x 4, continent   ABNL LAB/BG: NA   DRAIN/DEVICES: NA   TELEMETRY RHYTHM: NA   SKIN: pale, redness to knees and back   TESTS/PROCEDURES: NA   D/C DATE: pending  Discharge Barriers: placement and guardianship   OTHER IMPORTANT INFO:cooperative most of shift, easily re-directable.    More agitated towards morning, yelling out at people he is seeing in his room. States he won the lottery and his sister ripped up the ticket. Screaming in room at people not there. Reassurance provided.

## 2021-05-10 LAB
LABORATORY COMMENT REPORT: NORMAL
MRSA DNA SPEC QL NAA+PROBE: NEGATIVE
SARS-COV-2 RNA RESP QL NAA+PROBE: NEGATIVE
SPECIMEN SOURCE: NORMAL
SPECIMEN SOURCE: NORMAL

## 2021-05-10 PROCEDURE — 87635 SARS-COV-2 COVID-19 AMP PRB: CPT | Performed by: HOSPITALIST

## 2021-05-10 PROCEDURE — 120N000001 HC R&B MED SURG/OB

## 2021-05-10 PROCEDURE — 250N000013 HC RX MED GY IP 250 OP 250 PS 637: Performed by: PSYCHIATRY & NEUROLOGY

## 2021-05-10 PROCEDURE — 99232 SBSQ HOSP IP/OBS MODERATE 35: CPT | Performed by: PSYCHIATRY & NEUROLOGY

## 2021-05-10 PROCEDURE — 87640 STAPH A DNA AMP PROBE: CPT | Performed by: HOSPITALIST

## 2021-05-10 PROCEDURE — 250N000013 HC RX MED GY IP 250 OP 250 PS 637: Performed by: HOSPITALIST

## 2021-05-10 PROCEDURE — 99232 SBSQ HOSP IP/OBS MODERATE 35: CPT | Performed by: HOSPITALIST

## 2021-05-10 PROCEDURE — 87641 MR-STAPH DNA AMP PROBE: CPT | Performed by: HOSPITALIST

## 2021-05-10 PROCEDURE — 250N000013 HC RX MED GY IP 250 OP 250 PS 637: Performed by: INTERNAL MEDICINE

## 2021-05-10 PROCEDURE — 250N000013 HC RX MED GY IP 250 OP 250 PS 637: Performed by: NURSE PRACTITIONER

## 2021-05-10 RX ORDER — VENLAFAXINE HYDROCHLORIDE 37.5 MG/1
37.5 CAPSULE, EXTENDED RELEASE ORAL
Status: COMPLETED | OUTPATIENT
Start: 2021-05-11 | End: 2021-05-17

## 2021-05-10 RX ORDER — DIVALPROEX SODIUM 500 MG/1
500 TABLET, EXTENDED RELEASE ORAL AT BEDTIME
Status: DISCONTINUED | OUTPATIENT
Start: 2021-05-10 | End: 2021-05-17

## 2021-05-10 RX ADMIN — HALOPERIDOL 10 MG: 5 TABLET ORAL at 20:49

## 2021-05-10 RX ADMIN — DOCUSATE SODIUM 100 MG: 100 CAPSULE, LIQUID FILLED ORAL at 08:31

## 2021-05-10 RX ADMIN — DOCUSATE SODIUM 100 MG: 100 CAPSULE, LIQUID FILLED ORAL at 20:49

## 2021-05-10 RX ADMIN — HALOPERIDOL 10 MG: 5 TABLET ORAL at 14:08

## 2021-05-10 RX ADMIN — ATORVASTATIN CALCIUM 80 MG: 40 TABLET, FILM COATED ORAL at 20:49

## 2021-05-10 RX ADMIN — SALMETEROL XINAFOATE 1 PUFF: 50 POWDER, METERED ORAL; RESPIRATORY (INHALATION) at 08:38

## 2021-05-10 RX ADMIN — Medication 800 UNITS: at 08:31

## 2021-05-10 RX ADMIN — ASPIRIN 81 MG: 81 TABLET, COATED ORAL at 08:31

## 2021-05-10 RX ADMIN — OLANZAPINE 5 MG: 5 TABLET, ORALLY DISINTEGRATING ORAL at 15:15

## 2021-05-10 RX ADMIN — HALOPERIDOL 10 MG: 5 TABLET ORAL at 08:36

## 2021-05-10 RX ADMIN — LEVOTHYROXINE SODIUM 88 MCG: 88 TABLET ORAL at 08:31

## 2021-05-10 RX ADMIN — BENZTROPINE MESYLATE 1 MG: 0.5 TABLET ORAL at 20:49

## 2021-05-10 RX ADMIN — BENZTROPINE MESYLATE 1 MG: 0.5 TABLET ORAL at 08:31

## 2021-05-10 RX ADMIN — MEMANTINE 5 MG: 5 TABLET ORAL at 08:31

## 2021-05-10 RX ADMIN — SALMETEROL XINAFOATE 1 PUFF: 50 POWDER, METERED ORAL; RESPIRATORY (INHALATION) at 20:49

## 2021-05-10 RX ADMIN — VENLAFAXINE HYDROCHLORIDE 75 MG: 75 CAPSULE, EXTENDED RELEASE ORAL at 08:31

## 2021-05-10 RX ADMIN — DIVALPROEX SODIUM 500 MG: 500 TABLET, FILM COATED, EXTENDED RELEASE ORAL at 21:42

## 2021-05-10 ASSESSMENT — ACTIVITIES OF DAILY LIVING (ADL)
ADLS_ACUITY_SCORE: 13

## 2021-05-10 NOTE — PROGRESS NOTES
[facility]    Medicine Progress Note - Hospitalist Service       Date of Admission:  9/9/2020  Assessment & Plan       John Juárez is a 55 year old male admitted on 9/9/2020. PMHx of schizophrenia, hx of TBI, alcohol use disorder, COPD, hyperlipidemia and hypothyroidism who was admitted on 9/9/2020 for evaluation of aggressive behavior at his group home.    Was evaluated by Psychiatry and his condition stabilized, though his group home was unwilling to take him back and thus hospitalization has been prolonged awaiting new placement and guardianship. Under civil commitment through [facility].     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  Brought to hospital for evaluation of aggressive behaviors. Group home now unwilling to take him back. Has had numerous CODE 21s called this stay. Seen by psych, meds adjusted. Is currently under civil commitment and court hold through [facility] until 7/31/21.   --Continue on Haldol 10mg TID (timed for when patient tends to be most agitated), memantine 5mg daily, venlafaxine 37.5mg daily, benztropine 1mg BID   -- prns available for agitation ( Zyprexa and Ativan)  --Patient was seen by psychiatry on 5/10 given increased agitation episodes the previous day.  Psychiatry has decreased the dose of venlafaxine ER to 37.5 mg daily for 7 days with plans to discontinue after that.  Also started on Depakote  mg nightly for mood regulation.  Check level on 5/12.  -- SW following for placement, placement will occur once guardianship is established     Possible Tardive Dyskinesia vs EPSE  Per psych assessment on 4/1/21, noted to have possible tardive dyskinesia vs extrapyramidal side effects of meds. At that time, recommended to increase Cogentin to 1mg BID and add vitamin E 800 international unit(s) daily.      Back cellulitis in  1/2021: Resolved  Noted on 1/24/21, initiated on cephalexin at that time. Received local wound care and completed 7-day course of antibiotics on 1/30     Baseline Hypotension  BP 90-110s systolic on average. Asymptomatic. No concerns     Hyperlipidemia  Chronic and stable on llow dose ASA and  atorvastatin     COPD  Not O2 dependent. Chronic and stable on salmeterol, albuterol prn     Hypothyroidism  Chronic and stable on levothyroxine     Tobacco use disorder  Using nicotine patch and PRN gum     Resting tremor of upper extremities  No acute issues      Hx of infective endocarditis with severe mitral and aortic regurgitation S/P bioprosthetic valve replacement (10/2015)  Systolic heart murmur  HFrEF, not in acute exacerbation: Follows with Dr. Dockery of Heart and Vascular Cardiology, last seen in 1/2020. Note heart murmur on exam, strongest in aortic region. No CP, SOB, dizziness, syncope.   * Echocardiogram in 5/2016 with EF 50%, no evidence for prosthetic mitral and aortic valve dysfunction.   - Follow-up with outpatient Cardiology upon dismissal from the hospital (on every 2 year follow-ups)   - Continue ASA      Hx of CVA: Note basal ganglion stroke in 2015. No focal neuro deficits aside from cognitive dysfunction as above.          Diet: Room Service  Diet  Combination Diet Regular Diet Adult; Safe Tray - with utensils  Snacks/Supplements Adult: Other; Bedtime snack; Between Meals  Room Service    DVT Prophylaxis: Pneumatic Compression Devices  Valentin Catheter: not present  Code Status: Full Code           Disposition Plan   Expected discharge: pending guardianship and placement  Entered: Ashtyn Barrientos MD 05/10/2021, 2:28 PM       The patient's care was discussed with the Patient.    Ashtyn Barrientos MD  Hospitalist Service  [facility]  Contact information available via [facility] Paging/Directory    ______________________________________________________________________    Interval History  "   Chart reviewed from overnight.  He had episodes of agitation last evening requiring as needed medications.  This morning he was laying in bed, alert, mumbling speech, hard to follow.  Asks about discharging and tells me that he plans to go to his sister's house or one of his \"mentions\".  Rambling speech.  Hard to follow conversation.    Data reviewed today: I reviewed all medications, new labs and imaging results over the last 24 hours. I personally reviewed no images or EKG's today.    Physical Exam   Vital Signs: Temp: 98  F (36.7  C) Temp src: Oral BP: 117/60 Pulse: 81   Resp: 18 SpO2: 97 % O2 Device: None (Room air)    Weight: 185 lbs 9.6 oz  General Appearance: Well nourished male in NAD, lying in bed  Respiratory: respirations non-labored on room air  Cardiovascular:   GI:   Skin:   Other:  alert, rambling speech, hard to follow conversation, disoriented x3    Data   No lab results found in last 7 days.    "

## 2021-05-10 NOTE — PLAN OF CARE
DATE & TIME: 5/9/2021; 5552-7015  Cognitive Concerns/ Orientation : A&O to self, forgetful, confused, anxious and restless at start of shift.  BEHAVIOR & AGGRESSION TOOL COLOR: Green  CIWA SCORE: NA    ABNL VS/O2: VSS on RA, daily vitals  MOBILITY: independent in room, SBA in halls   PAIN MANAGMENT: denies   DIET: Regular, tolerating well   BOWEL/BLADDER: BS active x 4, continent   ABNL LAB/BG: NA   DRAIN/DEVICES: NA   TELEMETRY RHYTHM: NA   SKIN: pale, redness to knees and back   TESTS/PROCEDURES: NA   D/C DATE: pending  Discharge Barriers: placement and guardianship   OTHER IMPORTANT INFO: Patient slept most of night except to use the bathroom.

## 2021-05-10 NOTE — PLAN OF CARE
"DATE & TIME: 5/10/2021  Cognitive Concerns/ Orientation : A&Ox 2-3, forgetful, fixated on living in a [place] in [place] in AM, reports hearing \"Yessenia Lawson\" and starts swearing and yelling at her in afternoon, acknowledges his auditory hallucination, coop with staff.  BEHAVIOR & AGGRESSION TOOL COLOR: Yellow: hx of behavioral disturbances, occ agitated and attempts to leave.   CIWA SCORE: NA    ABNL VS/O2: VSS on RA, daily only.  MOBILITY: Up ad holley, ind in room, SBA in [place] for elopement risk.   PAIN MANAGMENT: Denies   DIET: Regular, hakeem, good appetite.   BOWEL/BLADDER: Continent  ABNL LAB/BG: NA   DRAIN/DEVICES: NA   TELEMETRY RHYTHM: NA   SKIN: Intact   TESTS/PROCEDURES: NA   D/C DATE UNKNOWN: Barriers include guardianship needed and placement found, court hold, SW involved.   OTHER IMPORTANT INFO: Contact iso for hx MRSA, nasal swab sent, 2nd swab ordered for 5/17. Psych made med adjustments, did not require PRN meds for behavior issues this shift. Door alarm and NA assigned for elopement risk.   "

## 2021-05-10 NOTE — CONSULTS
"[facility] Psychiatric Consult Progress Note    Interval History:   I met with the patient on station [id number].  Routine follow-up was requested with respect to his psychiatric medications, and ongoing concern that he gets intermittently agitated requiring IM medications to manage him.  He was agitated again yesterday and it seems to escalate as the day progresses.  This morning he is lethargic, dysarthric, difficult to understand.  He is on a fairly significant dose of Haldol along with Cogentin.  He is not able to give me meaningful history.  Staff indicates that he will start perseverating and rambling on about his \"mentions\".  It is unclear if this is a delusional/grandiose preoccupation.     Review of systems:   10 point Review of Systems completed by Sushil Wilks MD , and is  is negative other than noted in the HPI     Medications:       aspirin  81 mg Oral Daily     atorvastatin  80 mg Oral At Bedtime     benztropine  1 mg Oral BID     docusate sodium  100 mg Oral BID     haloperidol  10 mg Oral TID     levothyroxine  88 mcg Oral Daily     memantine  5 mg Oral Daily     salmeterol  1 puff Inhalation BID     [START ON 5/11/2021] venlafaxine  37.5 mg Oral Daily with breakfast     vitamin E  800 Units Oral Daily     acetaminophen, albuterol, alum & mag hydroxide-simethicone, haloperidol, LORazepam, melatonin, naloxone **OR** naloxone **OR** naloxone **OR** naloxone, nicotine, nicotine, OLANZapine, OLANZapine zydis, ondansetron **OR** ondansetron, polyethylene glycol    Mental Status Examination:   Appearance:  awake, alert  Eye Contact: Intense, he tends to stare blankly at times  Speech:  Impoverished, dysarthric, monotone, speaks in a whisper  language: Poor  Psychomotor Behavior:  some buccal-lingual dyskinesia, chewing movements  Mood: \"Mumbling\"  affect: Blunted   thought Process:  paucity of speech, no obvious loosening of associations flight of ideas or formal thought " "disorder  Thought Content:  no evidence of suicidal ideation or homicidal ideation, but at times he does tend to stare off into space, looks a guarded, but pleasant  Oriented to: Person only, he thought he was at the [facility] stating \"I am at [facility]\"  Attention Span and Concentration:  poor  Recent and Remote Memory:  limited  Fund of Knowledge: delayed  Insight:  limited  Judgment:  limited       Labs/Vitals:   No results found for this or any previous visit (from the past 24 hour(s)).  B/P: 123/95, T: 97.6, P: 89, R: 18  Impression  John is a 55-year-old gentleman with a known major neurocognitive disorder and unspecified psychotic illness.  He continues to have significant agitation on an intermittent basis.  We can trial some Depakote, I am also going to ease him off of venlafaxine given the persistent escalation of his behavior.    DIagnoses:   1.  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   2.  Other schizophrenia spectrum disorder.   3.  Alcohol use disorder in remission.   4.  Stimulant use disorder, in remission.   5.  Chronic obstructive pulmonary disease.   6.  Hypertension.   7. R/O mild TD vs EPSE       Plan:   1.  Reduce venlafaxine ER to 37.5 mg daily for 7 days and then discontinue  2.  Start Depakote  mg nightly for mood regulation, check a level Wednesday   3.  Continue with door alarm, he is under commitment, it looks like they are pursuing guardianship  4.  Monitor sedation, he does have as needed Zyprexa and Ativan available.    Attestation:  Patient has been seen and evaluated by me,  Sushil Wilks MD    Visit/Communication Style   In person, face-to-face  "

## 2021-05-11 PROCEDURE — 250N000013 HC RX MED GY IP 250 OP 250 PS 637: Performed by: INTERNAL MEDICINE

## 2021-05-11 PROCEDURE — 99232 SBSQ HOSP IP/OBS MODERATE 35: CPT | Performed by: HOSPITALIST

## 2021-05-11 PROCEDURE — 250N000013 HC RX MED GY IP 250 OP 250 PS 637: Performed by: NURSE PRACTITIONER

## 2021-05-11 PROCEDURE — 120N000001 HC R&B MED SURG/OB

## 2021-05-11 PROCEDURE — 250N000013 HC RX MED GY IP 250 OP 250 PS 637: Performed by: PSYCHIATRY & NEUROLOGY

## 2021-05-11 PROCEDURE — 250N000013 HC RX MED GY IP 250 OP 250 PS 637: Performed by: HOSPITALIST

## 2021-05-11 RX ADMIN — BENZTROPINE MESYLATE 1 MG: 0.5 TABLET ORAL at 20:56

## 2021-05-11 RX ADMIN — SALMETEROL XINAFOATE 1 PUFF: 50 POWDER, METERED ORAL; RESPIRATORY (INHALATION) at 20:59

## 2021-05-11 RX ADMIN — DOCUSATE SODIUM 100 MG: 100 CAPSULE, LIQUID FILLED ORAL at 08:09

## 2021-05-11 RX ADMIN — VENLAFAXINE HYDROCHLORIDE 37.5 MG: 37.5 CAPSULE, EXTENDED RELEASE ORAL at 08:09

## 2021-05-11 RX ADMIN — MEMANTINE 5 MG: 5 TABLET ORAL at 08:09

## 2021-05-11 RX ADMIN — LORAZEPAM 2 MG: 1 TABLET ORAL at 18:09

## 2021-05-11 RX ADMIN — OLANZAPINE 10 MG: 5 TABLET, ORALLY DISINTEGRATING ORAL at 17:38

## 2021-05-11 RX ADMIN — DOCUSATE SODIUM 100 MG: 100 CAPSULE, LIQUID FILLED ORAL at 20:56

## 2021-05-11 RX ADMIN — LEVOTHYROXINE SODIUM 88 MCG: 88 TABLET ORAL at 08:09

## 2021-05-11 RX ADMIN — HALOPERIDOL 10 MG: 5 TABLET ORAL at 08:09

## 2021-05-11 RX ADMIN — Medication 800 UNITS: at 08:09

## 2021-05-11 RX ADMIN — ASPIRIN 81 MG: 81 TABLET, COATED ORAL at 08:09

## 2021-05-11 RX ADMIN — BENZTROPINE MESYLATE 1 MG: 0.5 TABLET ORAL at 08:09

## 2021-05-11 RX ADMIN — OLANZAPINE 10 MG: 5 TABLET, ORALLY DISINTEGRATING ORAL at 11:06

## 2021-05-11 RX ADMIN — SALMETEROL XINAFOATE 1 PUFF: 50 POWDER, METERED ORAL; RESPIRATORY (INHALATION) at 08:11

## 2021-05-11 RX ADMIN — LORAZEPAM 2 MG: 1 TABLET ORAL at 09:57

## 2021-05-11 RX ADMIN — HALOPERIDOL 10 MG: 5 TABLET ORAL at 20:56

## 2021-05-11 RX ADMIN — DIVALPROEX SODIUM 500 MG: 500 TABLET, FILM COATED, EXTENDED RELEASE ORAL at 20:59

## 2021-05-11 RX ADMIN — ATORVASTATIN CALCIUM 80 MG: 40 TABLET, FILM COATED ORAL at 20:56

## 2021-05-11 RX ADMIN — HALOPERIDOL 10 MG: 5 TABLET ORAL at 15:08

## 2021-05-11 ASSESSMENT — ACTIVITIES OF DAILY LIVING (ADL)
ADLS_ACUITY_SCORE: 13

## 2021-05-11 NOTE — PROGRESS NOTES
[facility]    Medicine Progress Note - Hospitalist Service       Date of Admission:  9/9/2020  Assessment & Plan       John Juárez is a 55 year old male admitted on 9/9/2020. PMHx of schizophrenia, hx of TBI, alcohol use disorder, COPD, hyperlipidemia and hypothyroidism who was admitted on 9/9/2020 for evaluation of aggressive behavior at his group home.    Was evaluated by Psychiatry and his condition stabilized, though his group home was unwilling to take him back and thus hospitalization has been prolonged awaiting new placement and guardianship. Under civil commitment through [facility].     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  Brought to hospital for evaluation of aggressive behaviors. Group home now unwilling to take him back. Has had numerous CODE 21s called this stay. Seen by psych, meds adjusted. Is currently under civil commitment and court hold through [facility] until 7/31/21.   --Continue on Haldol 10mg TID (timed for when patient tends to be most agitated), memantine 5mg daily, venlafaxine 37.5mg daily, benztropine 1mg BID   -- prns available for agitation ( Zyprexa and Ativan)  --Patient was seen by psychiatry on 5/10 given increased agitation episodes the previous day.  Psychiatry has decreased the dose of venlafaxine ER to 37.5 mg daily for 7 days with plans to discontinue after that.  Also started on Depakote  mg nightly for mood regulation.  Check level on 5/12.  -- SW following for placement, placement will occur once guardianship is established     Possible Tardive Dyskinesia vs EPSE  Per psych assessment on 4/1/21, noted to have possible tardive dyskinesia vs extrapyramidal side effects of meds. At that time, recommended to increase Cogentin to 1mg BID and add vitamin E 800 international unit(s) daily.      Back cellulitis in  1/2021: Resolved  Noted on 1/24/21, initiated on cephalexin at that time. Received local wound care and completed 7-day course of antibiotics on 1/30     Baseline Hypotension  BP 90-110s systolic on average. Asymptomatic. No concerns     Hyperlipidemia  Chronic and stable on llow dose ASA and  atorvastatin     COPD  Not O2 dependent. Chronic and stable on salmeterol, albuterol prn     Hypothyroidism  Chronic and stable on levothyroxine     Tobacco use disorder  Using nicotine patch and PRN gum     Resting tremor of upper extremities  No acute issues      Hx of infective endocarditis with severe mitral and aortic regurgitation S/P bioprosthetic valve replacement (10/2015)  Systolic heart murmur  HFrEF, not in acute exacerbation: Follows with Dr. Dockery of [facility], last seen in 1/2020. Note heart murmur on exam, strongest in aortic region. No CP, SOB, dizziness, syncope.   * Echocardiogram in 5/2016 with EF 50%, no evidence for prosthetic mitral and aortic valve dysfunction.   - Follow-up with outpatient Cardiology upon dismissal from the hospital (on every 2 year follow-ups)   - Continue ASA      Hx of CVA: Note basal ganglion stroke in 2015. No focal neuro deficits aside from cognitive dysfunction as above.          Diet: Room Service  Diet  Combination Diet Regular Diet Adult; Safe Tray - with utensils  Snacks/Supplements Adult: Other; Bedtime snack; Between Meals  Room Service    DVT Prophylaxis: Pneumatic Compression Devices  Valentin Catheter: not present  Code Status: Full Code           Disposition Plan   Expected discharge: pending guardianship and placement  Entered: Ashtyn Barrientos MD 05/11/2021, 4:55 PM       The patient's care was discussed with the Patient.    Ashtyn Barrientos MD  Hospitalist Service  [facility]  Contact information available via [facility] Paging/Directory    ______________________________________________________________________    Interval History     Chart reviewed from overnight.  Night was uneventful but this morning apparently he did make it down the stairs of the hospital to the main door.  Was able to be escorted back to his room with redirection.  Did require as needed Ativan and Zyprexa.  Later did calm down.  Was seen playing cards with bedside sitter later in the day.    Data reviewed today: I reviewed all medications, new labs and imaging results over the last 24 hours. I personally reviewed no images or EKG's today.    Physical Exam   Vital Signs: Temp: 97.8  F (36.6  C) Temp src: Oral BP: 108/72 Pulse: 69   Resp: 16 SpO2: 97 % O2 Device: None (Room air)    Weight: 185 lbs 9.6 oz  General Appearance: Well nourished male in NAD, sitting on chair, playing cards  Respiratory: respirations non-labored on room air  Cardiovascular:   GI:   Skin:   Other:  alert, rambling speech, hard to follow conversation, disoriented x3    Data   No lab results found in last 7 days.

## 2021-05-11 NOTE — PLAN OF CARE
DATE & TIME: 5/10/2021; 2300 - 8306  Cognitive Concerns/ Orientation : A & O x 1 (fluctuates), forgetful, calm and cooperative most of shift, requesting to go outside and smoke    BEHAVIOR & AGGRESSION TOOL COLOR: Yellow, pacing, frequently setting off door alarm- PRN zyprexa given at beginning of shift by day shift nurse, swearing and slamming door end of shift  CIWA SCORE: NA    ABNL VS/O2: VSS on RA, daily vitals   MOBILITY: independent in room, SBA in halls with staff   PAIN MANAGMENT: denies   DIET: Regular, tolerating well   BOWEL/BLADDER:BS active x 4, continent   ABNL LAB/BG: NA  DRAIN/DEVICES: NA   TELEMETRY RHYTHM: NA   SKIN: intact   TESTS/PROCEDURES: NA   D/C DATE: pending   Discharge Barriers: guardianship and placement   OTHER IMPORTANT INFO: contact precautions maintained (Hx MRSA, nasal swab sent by day shift, 2nd swab ordered for 5/17 per day shift note); Med adjustments made by psych today; Door alarm in place for elopement risk; pt slept well overnight

## 2021-05-11 NOTE — PLAN OF CARE
DATE & TIME: 5/11/21 3875-5851    Cognitive Concerns/ Orientation : Alert to self and place. Disoriented to time and situation.    BEHAVIOR & AGGRESSION TOOL COLOR: Yellow- Pt pacing, asking to go outside/smoke/family here to pick him up. Pt left floor (accompanied w/ staff) Went all the way to 1st floor- was redirectable w/ mult staff members present. All scheduled and PRN medications given throughout AM.  CIWA SCORE: NA   ABNL VS/O2: VSS on RA  MOBILITY: IND in room, SBA (elopment risk) in halls.  PAIN MANAGMENT: Denied any pain this shift  DIET: Regular diet  BOWEL/BLADDER: Continent of B/B. Voiding w/o difficulty, + flatus, + BS, - BM this shift  ABNL LAB/BG: NA  DRAIN/DEVICES: NA  TELEMETRY RHYTHM: NA  SKIN: CDI  TESTS/PROCEDURES: NA  D/C DAY/GOALS/PLACE: Pt Medically ready for discharge- Discharge pending guardianship and placement.   OTHER IMPORTANT INFO: Contact precautions for MRSA in nares- Pt ambulated in halls w/ staff. Pt needs encouragement and reminders about mask wearing when out of room. Door alarm in place, LA in place. After shower- pt calmed and has been resting peacefully.

## 2021-05-11 NOTE — PLAN OF CARE
DATE & TIME: 5/10/2021; 7940-1019  Cognitive Concerns/ Orientation : A & O x 1 (fluctuates during shift), forgetful, calm and cooperative most of shift, requesting to go outside and smoke    BEHAVIOR & AGGRESSION TOOL COLOR: Yellow, pacing, frequently setting off door alarm- PRN zyprexa given at beginning of shift by day shift nurse, swearing and slamming door end of shift  CIWA SCORE: NA    ABNL VS/O2: VSS on RA, daily vitals   MOBILITY: independent in room, SBA in halls with staff   PAIN MANAGMENT: denies   DIET: Regular, tolerating well   BOWEL/BLADDER:BS active x 4, continent   ABNL LAB/BG: NA  DRAIN/DEVICES: NA   TELEMETRY RHYTHM: NA   SKIN: intact, redness to knees  TESTS/PROCEDURES: NA   D/C DATE: pending   Discharge Barriers: guardianship and placement   OTHER IMPORTANT INFO: contact precautions maintained (Hx MRSA, nasal swab sent by day shift, 2nd swab ordered for 5/17 per day shift note); Med adjustments made by psych today; Door alarm in place for elopement risk

## 2021-05-12 LAB — VALPROATE SERPL-MCNC: 50 MG/L (ref 50–100)

## 2021-05-12 PROCEDURE — 36415 COLL VENOUS BLD VENIPUNCTURE: CPT | Performed by: PSYCHIATRY & NEUROLOGY

## 2021-05-12 PROCEDURE — 99231 SBSQ HOSP IP/OBS SF/LOW 25: CPT | Performed by: HOSPITALIST

## 2021-05-12 PROCEDURE — 250N000013 HC RX MED GY IP 250 OP 250 PS 637: Performed by: INTERNAL MEDICINE

## 2021-05-12 PROCEDURE — 250N000013 HC RX MED GY IP 250 OP 250 PS 637: Performed by: HOSPITALIST

## 2021-05-12 PROCEDURE — 120N000001 HC R&B MED SURG/OB

## 2021-05-12 PROCEDURE — 250N000013 HC RX MED GY IP 250 OP 250 PS 637: Performed by: PSYCHIATRY & NEUROLOGY

## 2021-05-12 PROCEDURE — 80164 ASSAY DIPROPYLACETIC ACD TOT: CPT | Performed by: PSYCHIATRY & NEUROLOGY

## 2021-05-12 RX ADMIN — HALOPERIDOL 10 MG: 5 TABLET ORAL at 17:59

## 2021-05-12 RX ADMIN — DOCUSATE SODIUM 100 MG: 100 CAPSULE, LIQUID FILLED ORAL at 08:39

## 2021-05-12 RX ADMIN — BENZTROPINE MESYLATE 1 MG: 0.5 TABLET ORAL at 08:39

## 2021-05-12 RX ADMIN — HALOPERIDOL 10 MG: 5 TABLET ORAL at 21:55

## 2021-05-12 RX ADMIN — ATORVASTATIN CALCIUM 80 MG: 40 TABLET, FILM COATED ORAL at 21:56

## 2021-05-12 RX ADMIN — SALMETEROL XINAFOATE 1 PUFF: 50 POWDER, METERED ORAL; RESPIRATORY (INHALATION) at 08:42

## 2021-05-12 RX ADMIN — BENZTROPINE MESYLATE 1 MG: 0.5 TABLET ORAL at 21:55

## 2021-05-12 RX ADMIN — DIVALPROEX SODIUM 500 MG: 500 TABLET, FILM COATED, EXTENDED RELEASE ORAL at 21:55

## 2021-05-12 RX ADMIN — ASPIRIN 81 MG: 81 TABLET, COATED ORAL at 08:39

## 2021-05-12 RX ADMIN — HALOPERIDOL 10 MG: 5 TABLET ORAL at 08:39

## 2021-05-12 RX ADMIN — MEMANTINE 5 MG: 5 TABLET ORAL at 08:39

## 2021-05-12 RX ADMIN — VENLAFAXINE HYDROCHLORIDE 37.5 MG: 37.5 CAPSULE, EXTENDED RELEASE ORAL at 08:39

## 2021-05-12 RX ADMIN — SALMETEROL XINAFOATE 1 PUFF: 50 POWDER, METERED ORAL; RESPIRATORY (INHALATION) at 21:56

## 2021-05-12 RX ADMIN — LEVOTHYROXINE SODIUM 88 MCG: 88 TABLET ORAL at 08:39

## 2021-05-12 RX ADMIN — LORAZEPAM 2 MG: 1 TABLET ORAL at 11:21

## 2021-05-12 RX ADMIN — Medication 800 UNITS: at 08:39

## 2021-05-12 ASSESSMENT — ACTIVITIES OF DAILY LIVING (ADL)
ADLS_ACUITY_SCORE: 13
ADLS_ACUITY_SCORE: 13
ADLS_ACUITY_SCORE: 15
ADLS_ACUITY_SCORE: 13
ADLS_ACUITY_SCORE: 13
ADLS_ACUITY_SCORE: 15

## 2021-05-12 NOTE — PLAN OF CARE
Pt here with dementia/behavioral disturbances. Became agitated around 11 AM - PRN ativan given. VSS. Up ad holley independent. Pt frustrated with hospital stay often - redirected by 1:1 sitter. Denies pain. Pt scoring yellow on the Aggression Stop Light Tool. Discharge pending SW.

## 2021-05-12 NOTE — PLAN OF CARE
DATE & TIME: 5/11/2021; 5187-2817  Cognitive Concerns/ Orientation : A & O x 1 (fluctuates), forgetful, calm and cooperative most of shift, requesting to leave and got agitated at 1740-prn zyprexa and prn ativan given with fair results. Has 1:1 sit now   BEHAVIOR & AGGRESSION TOOL COLOR: Yellow  CIWA SCORE: NA    ABNL VS/O2: VSS on RA, daily vitals   MOBILITY: independent in room, SBA in halls with staff   PAIN MANAGMENT: denies   DIET: Regular, tolerating well   BOWEL/BLADDER:BS active x 4, continent   ABNL LAB/BG: NA  DRAIN/DEVICES: NA   TELEMETRY RHYTHM: NA   SKIN: intact   TESTS/PROCEDURES: NA   D/C DATE: pending   Discharge Barriers: guardianship and placement   OTHER IMPORTANT INFO:   *Patient was seen by psychiatry on 5/10 given increased agitation episodes the previous day.  Psychiatry has decreased the dose of venlafaxine ER to 37.5 mg daily for 7 days with plans to discontinue after that. Also started on Depakote  mg nightly for mood regulation. Check level on 5/12.  *contact precautions maintained (Hx MRSA, nasal swab sent by day shift, 2nd swab ordered for 5/17 per day shift note-if both neg will discontinue iso)

## 2021-05-12 NOTE — PROGRESS NOTES
[facility]    Medicine Progress Note - Hospitalist Service       Date of Admission:  9/9/2020  Assessment & Plan       John Juárez is a 55 year old male admitted on 9/9/2020. PMHx of schizophrenia, hx of TBI, alcohol use disorder, COPD, hyperlipidemia and hypothyroidism who was admitted on 9/9/2020 for evaluation of aggressive behavior at his group home.    Was evaluated by Psychiatry and his condition stabilized, though his group home was unwilling to take him back and thus hospitalization has been prolonged awaiting new placement and guardianship. Under civil commitment through [facility].     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  Brought to hospital for evaluation of aggressive behaviors. Group home now unwilling to take him back. Has had numerous CODE 21s called this stay. Seen by psych, meds adjusted. Is currently under civil commitment and court hold through [facility] until 7/31/21.   --Continue on Haldol 10mg TID (timed for when patient tends to be most agitated), memantine 5mg daily, venlafaxine 37.5mg daily, benztropine 1mg BID   -- prns available for agitation ( Zyprexa and Ativan)  --Patient was seen by psychiatry on 5/10 given increased agitation episodes the previous day.  Psychiatry has decreased the dose of venlafaxine ER to 37.5 mg daily for 7 days with plans to discontinue after that.  Also started on Depakote  mg nightly for mood regulation.  Checked level on 5/12: adequate at 50.  -- SW following for placement, placement will occur once guardianship is established     Possible Tardive Dyskinesia vs EPSE  Per psych assessment on 4/1/21, noted to have possible tardive dyskinesia vs extrapyramidal side effects of meds. At that time, recommended to increase Cogentin to 1mg BID and add vitamin E 800 international unit(s) daily.      Back  cellulitis in 1/2021: Resolved  Noted on 1/24/21, initiated on cephalexin at that time. Received local wound care and completed 7-day course of antibiotics on 1/30     Baseline Hypotension  BP 90-110s systolic on average. Asymptomatic. No concerns     Hyperlipidemia  Chronic and stable on llow dose ASA and  atorvastatin     COPD  Not O2 dependent. Chronic and stable on salmeterol, albuterol prn     Hypothyroidism  Chronic and stable on levothyroxine     Tobacco use disorder  Using nicotine patch and PRN gum     Resting tremor of upper extremities  No acute issues      Hx of infective endocarditis with severe mitral and aortic regurgitation S/P bioprosthetic valve replacement (10/2015)  Systolic heart murmur  HFrEF, not in acute exacerbation: Follows with Dr. Dockery of [facility], last seen in 1/2020. Note heart murmur on exam, strongest in aortic region. No CP, SOB, dizziness, syncope.   * Echocardiogram in 5/2016 with EF 50%, no evidence for prosthetic mitral and aortic valve dysfunction.   - Follow-up with outpatient Cardiology upon dismissal from the hospital (on every 2 year follow-ups)   - Continue ASA      Hx of CVA: Note basal ganglion stroke in 2015. No focal neuro deficits aside from cognitive dysfunction as above.          Diet: Room Service  Diet  Combination Diet Regular Diet Adult; Safe Tray - with utensils  Snacks/Supplements Adult: Other; Bedtime snack; Between Meals  Room Service    DVT Prophylaxis: Pneumatic Compression Devices  Valentin Catheter: not present  Code Status: Full Code           Disposition Plan   Expected discharge: pending guardianship and placement  Entered: Ashtyn Barrientos MD 05/12/2021, 1:51 PM       The patient's care was discussed with the Patient.    Ashtyn Barrientos MD  Hospitalist Service  [facility]  Contact information available via [facility]  [place]omi[place]/y    ______________________________________________________________________    Interval History    Chart reviewed from overnight.  Remained stable overnight.  This morning apparently did try to walk down the stairwell but was able to be redirected back to his room.  Resting comfortably when seen.    Data reviewed today: I reviewed all medications, new labs and imaging results over the last 24 hours. I personally reviewed no images or EKG's today.    Physical Exam   Vital Signs: Temp: 97.6  F (36.4  C) Temp src: Oral BP: 122/82 Pulse: 88   Resp: 16 SpO2: 92 % O2 Device: None (Room air)    Weight: 185 lbs 9.6 oz  General Appearance: Well nourished male in NAD, laying on bed, appears comfortable  Respiratory: respirations non-labored on room air  Cardiovascular:   GI:   Skin:   Other:  alert, rambling speech, hard to follow conversation, disoriented x3    Data   No lab results found in last 7 days.

## 2021-05-12 NOTE — PLAN OF CARE
DATE & TIME: 5/11/2021; 9041 - 5723  Cognitive Concerns/ Orientation : A & O x 1 (fluctuates), forgetful, requesting to leave and got agitated at 1740-prn zyprexa and prn ativan given with fair results. Has 1:1 sit now; slept well this shift  BEHAVIOR & AGGRESSION TOOL COLOR: Yellow  CIWA SCORE: NA    ABNL VS/O2: Daily vitals  MOBILITY: independent in room, SBA in halls with staff   PAIN MANAGMENT: denies   DIET: Regular, tolerating well   BOWEL/BLADDER: Continent   ABNL LAB/BG: NA  DRAIN/DEVICES: NA   TELEMETRY RHYTHM: NA   SKIN: intact   TESTS/PROCEDURES: NA   D/C DATE: pending   Discharge Barriers: guardianship and placement   OTHER IMPORTANT INFO:   *Patient was seen by psychiatry on 5/10 given increased agitation episodes the previous day.  Psychiatry has decreased the dose of venlafaxine ER to 37.5 mg daily for 7 days with plans to discontinue after that. Also started on Depakote  mg nightly for mood regulation. Check level on 5/12.  *contact precautions maintained (Hx MRSA, 1st swab negative, 2nd swab ordered for 5/17, if neg will discontinue iso)

## 2021-05-13 PROCEDURE — 250N000013 HC RX MED GY IP 250 OP 250 PS 637: Performed by: NURSE PRACTITIONER

## 2021-05-13 PROCEDURE — 250N000013 HC RX MED GY IP 250 OP 250 PS 637: Performed by: HOSPITALIST

## 2021-05-13 PROCEDURE — 120N000001 HC R&B MED SURG/OB

## 2021-05-13 PROCEDURE — U0005 INFEC AGEN DETEC AMPLI PROBE: HCPCS | Performed by: INTERNAL MEDICINE

## 2021-05-13 PROCEDURE — 99231 SBSQ HOSP IP/OBS SF/LOW 25: CPT | Performed by: HOSPITALIST

## 2021-05-13 PROCEDURE — 250N000013 HC RX MED GY IP 250 OP 250 PS 637: Performed by: PSYCHIATRY & NEUROLOGY

## 2021-05-13 PROCEDURE — 250N000013 HC RX MED GY IP 250 OP 250 PS 637: Performed by: INTERNAL MEDICINE

## 2021-05-13 PROCEDURE — U0003 INFECTIOUS AGENT DETECTION BY NUCLEIC ACID (DNA OR RNA); SEVERE ACUTE RESPIRATORY SYNDROME CORONAVIRUS 2 (SARS-COV-2) (CORONAVIRUS DISEASE [COVID-19]), AMPLIFIED PROBE TECHNIQUE, MAKING USE OF HIGH THROUGHPUT TECHNOLOGIES AS DESCRIBED BY CMS-2020-01-R: HCPCS | Performed by: INTERNAL MEDICINE

## 2021-05-13 RX ADMIN — DOCUSATE SODIUM 100 MG: 100 CAPSULE, LIQUID FILLED ORAL at 20:59

## 2021-05-13 RX ADMIN — LORAZEPAM 2 MG: 1 TABLET ORAL at 21:47

## 2021-05-13 RX ADMIN — NICOTINE POLACRILEX 2 MG: 2 GUM, CHEWING ORAL at 16:16

## 2021-05-13 RX ADMIN — Medication 800 UNITS: at 07:52

## 2021-05-13 RX ADMIN — MEMANTINE 5 MG: 5 TABLET ORAL at 07:53

## 2021-05-13 RX ADMIN — DIVALPROEX SODIUM 500 MG: 500 TABLET, FILM COATED, EXTENDED RELEASE ORAL at 21:00

## 2021-05-13 RX ADMIN — DOCUSATE SODIUM 100 MG: 100 CAPSULE, LIQUID FILLED ORAL at 07:51

## 2021-05-13 RX ADMIN — NICOTINE POLACRILEX 2 MG: 2 GUM, CHEWING ORAL at 21:41

## 2021-05-13 RX ADMIN — SALMETEROL XINAFOATE 1 PUFF: 50 POWDER, METERED ORAL; RESPIRATORY (INHALATION) at 07:51

## 2021-05-13 RX ADMIN — HALOPERIDOL 10 MG: 5 TABLET ORAL at 15:03

## 2021-05-13 RX ADMIN — HALOPERIDOL 10 MG: 5 TABLET ORAL at 07:52

## 2021-05-13 RX ADMIN — SALMETEROL XINAFOATE 1 PUFF: 50 POWDER, METERED ORAL; RESPIRATORY (INHALATION) at 21:06

## 2021-05-13 RX ADMIN — BENZTROPINE MESYLATE 1 MG: 0.5 TABLET ORAL at 07:52

## 2021-05-13 RX ADMIN — LEVOTHYROXINE SODIUM 88 MCG: 88 TABLET ORAL at 07:52

## 2021-05-13 RX ADMIN — HALOPERIDOL 10 MG: 5 TABLET ORAL at 20:59

## 2021-05-13 RX ADMIN — BENZTROPINE MESYLATE 1 MG: 0.5 TABLET ORAL at 20:59

## 2021-05-13 RX ADMIN — ATORVASTATIN CALCIUM 80 MG: 40 TABLET, FILM COATED ORAL at 20:59

## 2021-05-13 RX ADMIN — ASPIRIN 81 MG: 81 TABLET, COATED ORAL at 07:52

## 2021-05-13 RX ADMIN — OLANZAPINE 10 MG: 5 TABLET, ORALLY DISINTEGRATING ORAL at 15:47

## 2021-05-13 RX ADMIN — VENLAFAXINE HYDROCHLORIDE 37.5 MG: 37.5 CAPSULE, EXTENDED RELEASE ORAL at 07:52

## 2021-05-13 RX ADMIN — NICOTINE 1 PATCH: 7 PATCH, EXTENDED RELEASE TRANSDERMAL at 16:16

## 2021-05-13 ASSESSMENT — ACTIVITIES OF DAILY LIVING (ADL)
ADLS_ACUITY_SCORE: 15
ADLS_ACUITY_SCORE: 15
ADLS_ACUITY_SCORE: 13
ADLS_ACUITY_SCORE: 15
ADLS_ACUITY_SCORE: 13
ADLS_ACUITY_SCORE: 13

## 2021-05-13 NOTE — PROGRESS NOTES
[facility]    Medicine Progress Note - Hospitalist Service       Date of Admission:  9/9/2020  Assessment & Plan       John Juárez is a 55 year old male admitted on 9/9/2020. PMHx of schizophrenia, hx of TBI, alcohol use disorder, COPD, hyperlipidemia and hypothyroidism who was admitted on 9/9/2020 for evaluation of aggressive behavior at his group home.    Was evaluated by Psychiatry and his condition stabilized, though his group home was unwilling to take him back and thus hospitalization has been prolonged awaiting new placement and guardianship. Under civil commitment through [facility].     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  Brought to hospital for evaluation of aggressive behaviors. Group home now unwilling to take him back. Has had numerous CODE 21s called this stay. Seen by psych, meds adjusted. Is currently under civil commitment and court hold through [facility] until 7/31/21.   --Continue on Haldol 10mg TID (timed for when patient tends to be most agitated), memantine 5mg daily, venlafaxine 37.5mg daily, benztropine 1mg BID   -- prns available for agitation ( Zyprexa and Ativan)  --Patient was seen by psychiatry on 5/10 given increased agitation episodes the previous day.  Psychiatry has decreased the dose of venlafaxine ER to 37.5 mg daily for 7 days with plans to discontinue after that.  Also started on Depakote  mg nightly for mood regulation.  Checked level on 5/12: adequate at 50.  -- SW following for placement, placement will occur once guardianship is established     Possible Tardive Dyskinesia vs EPSE  Per psych assessment on 4/1/21, noted to have possible tardive dyskinesia vs extrapyramidal side effects of meds. At that time, recommended to increase Cogentin to 1mg BID and add vitamin E 800 international unit(s) daily.      Back  cellulitis in 1/2021: Resolved  Noted on 1/24/21, initiated on cephalexin at that time. Received local wound care and completed 7-day course of antibiotics on 1/30     Baseline Hypotension  BP 90-110s systolic on average. Asymptomatic. No concerns     Hyperlipidemia  Chronic and stable on llow dose ASA and  atorvastatin     COPD  Not O2 dependent. Chronic and stable on salmeterol, albuterol prn     Hypothyroidism  Chronic and stable on levothyroxine     Tobacco use disorder  Using nicotine patch and PRN gum     Resting tremor of upper extremities  No acute issues      Hx of infective endocarditis with severe mitral and aortic regurgitation S/P bioprosthetic valve replacement (10/2015)  Systolic heart murmur  HFrEF, not in acute exacerbation: Follows with Dr. Dockery of [facility], last seen in 1/2020. Note heart murmur on exam, strongest in aortic region. No CP, SOB, dizziness, syncope.   * Echocardiogram in 5/2016 with EF 50%, no evidence for prosthetic mitral and aortic valve dysfunction.   - Follow-up with outpatient Cardiology upon dismissal from the hospital (on every 2 year follow-ups)   - Continue ASA      Hx of CVA: Note basal ganglion stroke in 2015. No focal neuro deficits aside from cognitive dysfunction as above.          Diet: Room Service  Diet  Combination Diet Regular Diet Adult; Safe Tray - with utensils  Snacks/Supplements Adult: Other; Bedtime snack; Between Meals  Room Service    DVT Prophylaxis: Pneumatic Compression Devices  Valentin Catheter: not present  Code Status: Full Code           Disposition Plan   Expected discharge: pending guardianship and placement  Entered: Bora Walls DO 05/13/2021, 12:34 PM       The patient's care was discussed with the Patient.    Bora Walls DO  Hospitalist Service  [facility]  Contact information available via [facility]  Paging/Directory    ______________________________________________________________________    Interval History    Chart reviewed. No major outbursts. Awaiting placement.     Data reviewed today: I reviewed all medications, new labs and imaging results over the last 24 hours. I personally reviewed no images or EKG's today.    Physical Exam   Vital Signs: Temp: 97.4  F (36.3  C) Temp src: Oral BP: 103/70 Pulse: 78   Resp: 16 SpO2: 98 % O2 Device: None (Room air)    Weight: 185 lbs 9.6 oz  General Appearance: Well nourished male in NAD, laying on bed, appears comfortable  Respiratory: respirations non-labored on room air  Cardiovascular:   GI:   Skin:   Other:  alert, rambling speech, hard to follow conversation, disoriented x3    Data   No lab results found in last 7 days.

## 2021-05-13 NOTE — PLAN OF CARE
DATE & TIME: 5/12/2021 0971 - 7349  Cognitive Concerns/ Orientation : A/O x 1 self, (fluctuates), forgetful, sitter at bedside  BEHAVIOR & AGGRESSION TOOL COLOR: green, cooperative, slept well this shift  CIWA SCORE: NA    ABNL VS/O2: VSS on RA  MOBILITY: independent in room, SBA in halls with staff   PAIN MANAGMENT: denies   DIET: Regular, good po  BOWEL/BLADDER: Continent , up to BR  ABNL LAB/BG: NA  DRAIN/DEVICES: NA   TELEMETRY RHYTHM: NA   SKIN: intact   TESTS/PROCEDURES: NA   D/C DATE: pending   Discharge Barriers: guardianship and placement   OTHER IMPORTANT INFO: Haldol given as scheduled, Psych following.

## 2021-05-13 NOTE — PLAN OF CARE
DATE & TIME: 5/11/2021 3-11p  Cognitive Concerns/ Orientation : A/O x 1 self, (fluctuates), forgetful, sitter at bedside  BEHAVIOR & AGGRESSION TOOL COLOR: green, cooperative, restless/anxious at times  CIWA SCORE: NA    ABNL VS/O2: VSS on RA  MOBILITY: independent in room, SBA in halls with staff   PAIN MANAGMENT: denies   DIET: Regular, good po  BOWEL/BLADDER: Continent , up to BR  ABNL LAB/BG: NA  DRAIN/DEVICES: NA   TELEMETRY RHYTHM: NA   SKIN: intact   TESTS/PROCEDURES: NA   D/C DATE: pending   Discharge Barriers: guardianship and placement   OTHER IMPORTANT INFO: Haldol given as scheduled, Psych following.

## 2021-05-13 NOTE — PLAN OF CARE
DATE & TIME: 5/13/2021 7-3pm  Cognitive Concerns/ Orientation : A/O x 1 self, (fluctuates), forgetful, sitter at bedside  BEHAVIOR & AGGRESSION TOOL COLOR: green, cooperative  CIWA SCORE: NA    ABNL VS/O2: VSS on RA  MOBILITY: independent in room, SBA in halls with staff   PAIN MANAGMENT: denies   DIET: Regular, good po  BOWEL/BLADDER: Continent , up to BR  ABNL LAB/BG: NA  DRAIN/DEVICES: NA   TELEMETRY RHYTHM: NA   SKIN: intact   TESTS/PROCEDURES: NA   D/C DATE: pending   Discharge Barriers: guardianship and placement   OTHER IMPORTANT INFO: Covid swab done today. Haldol given as scheduled, Psych following. Recheck MRSA swab on 5/17 (first one negative).

## 2021-05-14 PROCEDURE — 250N000013 HC RX MED GY IP 250 OP 250 PS 637: Performed by: INTERNAL MEDICINE

## 2021-05-14 PROCEDURE — 250N000013 HC RX MED GY IP 250 OP 250 PS 637: Performed by: HOSPITALIST

## 2021-05-14 PROCEDURE — 99231 SBSQ HOSP IP/OBS SF/LOW 25: CPT | Performed by: HOSPITALIST

## 2021-05-14 PROCEDURE — 250N000013 HC RX MED GY IP 250 OP 250 PS 637: Performed by: PSYCHIATRY & NEUROLOGY

## 2021-05-14 PROCEDURE — 250N000013 HC RX MED GY IP 250 OP 250 PS 637: Performed by: NURSE PRACTITIONER

## 2021-05-14 PROCEDURE — 120N000001 HC R&B MED SURG/OB

## 2021-05-14 RX ADMIN — NICOTINE 1 PATCH: 7 PATCH, EXTENDED RELEASE TRANSDERMAL at 16:43

## 2021-05-14 RX ADMIN — BENZTROPINE MESYLATE 1 MG: 0.5 TABLET ORAL at 21:06

## 2021-05-14 RX ADMIN — OLANZAPINE 10 MG: 5 TABLET, ORALLY DISINTEGRATING ORAL at 11:58

## 2021-05-14 RX ADMIN — SALMETEROL XINAFOATE 1 PUFF: 50 POWDER, METERED ORAL; RESPIRATORY (INHALATION) at 10:18

## 2021-05-14 RX ADMIN — SALMETEROL XINAFOATE 1 PUFF: 50 POWDER, METERED ORAL; RESPIRATORY (INHALATION) at 21:18

## 2021-05-14 RX ADMIN — ASPIRIN 81 MG: 81 TABLET, COATED ORAL at 08:25

## 2021-05-14 RX ADMIN — LEVOTHYROXINE SODIUM 88 MCG: 88 TABLET ORAL at 08:25

## 2021-05-14 RX ADMIN — LORAZEPAM 2 MG: 1 TABLET ORAL at 10:26

## 2021-05-14 RX ADMIN — HALOPERIDOL 10 MG: 5 TABLET ORAL at 15:21

## 2021-05-14 RX ADMIN — OLANZAPINE 10 MG: 5 TABLET, ORALLY DISINTEGRATING ORAL at 17:35

## 2021-05-14 RX ADMIN — HALOPERIDOL 10 MG: 5 TABLET ORAL at 08:25

## 2021-05-14 RX ADMIN — BENZTROPINE MESYLATE 1 MG: 0.5 TABLET ORAL at 08:25

## 2021-05-14 RX ADMIN — DIVALPROEX SODIUM 500 MG: 500 TABLET, FILM COATED, EXTENDED RELEASE ORAL at 21:06

## 2021-05-14 RX ADMIN — DOCUSATE SODIUM 100 MG: 100 CAPSULE, LIQUID FILLED ORAL at 08:26

## 2021-05-14 RX ADMIN — MEMANTINE 5 MG: 5 TABLET ORAL at 08:25

## 2021-05-14 RX ADMIN — LORAZEPAM 2 MG: 1 TABLET ORAL at 16:42

## 2021-05-14 RX ADMIN — VENLAFAXINE HYDROCHLORIDE 37.5 MG: 37.5 CAPSULE, EXTENDED RELEASE ORAL at 08:25

## 2021-05-14 RX ADMIN — DOCUSATE SODIUM 100 MG: 100 CAPSULE, LIQUID FILLED ORAL at 21:06

## 2021-05-14 RX ADMIN — Medication 800 UNITS: at 08:25

## 2021-05-14 RX ADMIN — HALOPERIDOL 10 MG: 5 TABLET ORAL at 21:06

## 2021-05-14 RX ADMIN — ATORVASTATIN CALCIUM 80 MG: 40 TABLET, FILM COATED ORAL at 21:06

## 2021-05-14 ASSESSMENT — ACTIVITIES OF DAILY LIVING (ADL)
ADLS_ACUITY_SCORE: 13

## 2021-05-14 NOTE — PLAN OF CARE
DATE & TIME: 05/14  Cognitive Concerns/ Orientation : A/O x self, forgetful, sitter outside room.   BEHAVIOR & AGGRESSION TOOL COLOR: Yellow. PRN ativan and zyprexa given for agitation and restlessness.  CIWA SCORE: NA    ABNL VS/O2: VSS on RA  MOBILITY: Ind  PAIN MANAGMENT: Denies  DIET: Regular  BOWEL/BLADDER: Continent , up to BR  ABNL LAB/BG: NA  DRAIN/DEVICES: NA   TELEMETRY RHYTHM: NA   SKIN: intact   TESTS/PROCEDURES: NA   D/C DATE: Pending   Discharge Barriers: guardianship and placement.  OTHER IMPORTANT INFO: 5/13 Covid negative. Recheck MRSA swab on 5/17 (first one negative).

## 2021-05-14 NOTE — PROGRESS NOTES
"Pt exiting room multiple times asking when he can leave, where his belongings are, why he is still here, reporting he has several mansions he can go live in,  able to redirect and distract with walks for only a short time. Pt becoming increasingly agitated with raising his voice and slamming his door. Pt asks if I can \"hear the other dai's voice.\" PRN ativan effective for an hour. PRN zyprexa given.   "

## 2021-05-14 NOTE — PLAN OF CARE
DATE & TIME: 5/13/2021 3-11P  Cognitive Concerns/ Orientation : A/O x 1 self, (fluctuates), forgetful, sitter at bedside  BEHAVIOR & AGGRESSION TOOL COLOR: green, cooperative, anxious/restless, hallucinating  CIWA SCORE: NA    ABNL VS/O2: VSS on RA  MOBILITY: independent in room, SBA in halls with staff   PAIN MANAGMENT: denies   DIET: Regular, good po  BOWEL/BLADDER: Continent , up to BR  ABNL LAB/BG: NA  DRAIN/DEVICES: NA   TELEMETRY RHYTHM: NA   SKIN: intact   TESTS/PROCEDURES: NA   D/C DATE: pending   Discharge Barriers: guardianship and placement   OTHER IMPORTANT INFO: Covid swab done today. Haldol given as scheduled, Zyprexa/Ativan prn x 1 given for anxiety, Psych following. Recheck MRSA swab on 5/17 (first one negative).

## 2021-05-14 NOTE — PLAN OF CARE
DATE & TIME: 05/13-05/14 4534-1328  Cognitive Concerns/ Orientation : A/O x 1 self, (fluctuates), forgetful, sitter at bedside  BEHAVIOR & AGGRESSION TOOL COLOR: green currently   CIWA SCORE: NA    ABNL VS/O2: VSS on RA  MOBILITY: independent in room, SBA in halls with staff   PAIN MANAGMENT: denies   DIET: Regular  BOWEL/BLADDER: Continent , up to BR  ABNL LAB/BG: NA  DRAIN/DEVICES: NA   TELEMETRY RHYTHM: NA   SKIN: intact   TESTS/PROCEDURES: NA   D/C DATE: pending   Discharge Barriers: guardianship and placement   OTHER IMPORTANT INFO: Covid swab done yesterday. Recheck MRSA swab on 5/17 (first one negative).

## 2021-05-14 NOTE — PROGRESS NOTES
[facility]    Medicine Progress Note - Hospitalist Service       Date of Admission:  9/9/2020  Assessment & Plan       John Juárez is a 55 year old male admitted on 9/9/2020. PMHx of schizophrenia, hx of TBI, alcohol use disorder, COPD, hyperlipidemia and hypothyroidism who was admitted on 9/9/2020 for evaluation of aggressive behavior at his group home.    Was evaluated by Psychiatry and his condition stabilized, though his group home was unwilling to take him back and thus hospitalization has been prolonged awaiting new placement and guardianship. Under civil commitment through [facility].     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  Brought to hospital for evaluation of aggressive behaviors. Group home now unwilling to take him back. Has had numerous CODE 21s called this stay. Seen by psych, meds adjusted. Is currently under civil commitment and court hold through [facility] until 7/31/21.   --Continue on Haldol 10mg TID (timed for when patient tends to be most agitated), memantine 5mg daily, venlafaxine 37.5mg daily, benztropine 1mg BID   -- prns available for agitation ( Zyprexa and Ativan)  --Patient was seen by psychiatry on 5/10 given increased agitation episodes the previous day.  Psychiatry has decreased the dose of venlafaxine ER to 37.5 mg daily for 7 days with plans to discontinue after that.  Also started on Depakote  mg nightly for mood regulation.  Checked level on 5/12: adequate at 50.  -- SW following for placement, placement will occur once guardianship is established     Possible Tardive Dyskinesia vs EPSE  Per psych assessment on 4/1/21, noted to have possible tardive dyskinesia vs extrapyramidal side effects of meds. At that time, recommended to increase Cogentin to 1mg BID and add vitamin E 800 international unit(s) daily.      Back  cellulitis in 1/2021: Resolved  Noted on 1/24/21, initiated on cephalexin at that time. Received local wound care and completed 7-day course of antibiotics on 1/30     Baseline Hypotension  BP 90-110s systolic on average. Asymptomatic. No concerns     Hyperlipidemia  Chronic and stable on llow dose ASA and  atorvastatin     COPD  Not O2 dependent. Chronic and stable on salmeterol, albuterol prn     Hypothyroidism  Chronic and stable on levothyroxine     Tobacco use disorder  Using nicotine patch and PRN gum     Resting tremor of upper extremities  No acute issues      Hx of infective endocarditis with severe mitral and aortic regurgitation S/P bioprosthetic valve replacement (10/2015)  Systolic heart murmur  HFrEF, not in acute exacerbation: Follows with Dr. Dockery of [facility], last seen in 1/2020. Note heart murmur on exam, strongest in aortic region. No CP, SOB, dizziness, syncope.   * Echocardiogram in 5/2016 with EF 50%, no evidence for prosthetic mitral and aortic valve dysfunction.   - Follow-up with outpatient Cardiology upon dismissal from the hospital (on every 2 year follow-ups)   - Continue ASA      Hx of CVA: Note basal ganglion stroke in 2015. No focal neuro deficits aside from cognitive dysfunction as above.          Diet: Room Service  Diet  Combination Diet Regular Diet Adult; Safe Tray - with utensils  Snacks/Supplements Adult: Other; Bedtime snack; Between Meals  Room Service    DVT Prophylaxis: Pneumatic Compression Devices  Valentin Catheter: not present  Code Status: Full Code           Disposition Plan   Expected discharge: pending guardianship and placement  Entered: Bora Walls DO 05/14/2021, 12:06 PM       The patient's care was discussed with the Patient.    Bora Walls DO  Hospitalist Service  [facility]  Contact information available via [facility]  Paging/Directory    ______________________________________________________________________    Interval History    Chart reviewed. No major outbursts. Awaiting placement.     Data reviewed today: I reviewed all medications, new labs and imaging results over the last 24 hours. I personally reviewed no images or EKG's today.    Physical Exam   Vital Signs: Temp: 98.2  F (36.8  C) Temp src: Oral BP: (!) 142/92 Pulse: 69   Resp: 16 SpO2: 98 % O2 Device: None (Room air)    Weight: 185 lbs 9.6 oz  General Appearance: Well nourished male in NAD, laying on bed, appears comfortable  Respiratory: respirations non-labored on room air  Cardiovascular:   GI:   Skin:   Other:  alert, rambling speech, hard to follow conversation, disoriented x3    Data   No lab results found in last 7 days.

## 2021-05-15 PROCEDURE — 120N000001 HC R&B MED SURG/OB

## 2021-05-15 PROCEDURE — 99232 SBSQ HOSP IP/OBS MODERATE 35: CPT | Performed by: HOSPITALIST

## 2021-05-15 PROCEDURE — 250N000013 HC RX MED GY IP 250 OP 250 PS 637: Performed by: PSYCHIATRY & NEUROLOGY

## 2021-05-15 PROCEDURE — 250N000013 HC RX MED GY IP 250 OP 250 PS 637: Performed by: HOSPITALIST

## 2021-05-15 PROCEDURE — 250N000013 HC RX MED GY IP 250 OP 250 PS 637: Performed by: INTERNAL MEDICINE

## 2021-05-15 RX ORDER — POLYETHYLENE GLYCOL 3350 17 G
2 POWDER IN PACKET (EA) ORAL
Status: DISCONTINUED | OUTPATIENT
Start: 2021-05-15 | End: 2021-06-30 | Stop reason: HOSPADM

## 2021-05-15 RX ADMIN — DOCUSATE SODIUM 100 MG: 100 CAPSULE, LIQUID FILLED ORAL at 21:06

## 2021-05-15 RX ADMIN — SALMETEROL XINAFOATE 1 PUFF: 50 POWDER, METERED ORAL; RESPIRATORY (INHALATION) at 21:06

## 2021-05-15 RX ADMIN — HALOPERIDOL 10 MG: 5 TABLET ORAL at 08:08

## 2021-05-15 RX ADMIN — SALMETEROL XINAFOATE 1 PUFF: 50 POWDER, METERED ORAL; RESPIRATORY (INHALATION) at 08:09

## 2021-05-15 RX ADMIN — VENLAFAXINE HYDROCHLORIDE 37.5 MG: 37.5 CAPSULE, EXTENDED RELEASE ORAL at 08:09

## 2021-05-15 RX ADMIN — BENZTROPINE MESYLATE 1 MG: 0.5 TABLET ORAL at 21:06

## 2021-05-15 RX ADMIN — ATORVASTATIN CALCIUM 80 MG: 40 TABLET, FILM COATED ORAL at 21:06

## 2021-05-15 RX ADMIN — LEVOTHYROXINE SODIUM 88 MCG: 88 TABLET ORAL at 08:08

## 2021-05-15 RX ADMIN — HALOPERIDOL 10 MG: 5 TABLET ORAL at 15:28

## 2021-05-15 RX ADMIN — ASPIRIN 81 MG: 81 TABLET, COATED ORAL at 08:09

## 2021-05-15 RX ADMIN — HALOPERIDOL 10 MG: 5 TABLET ORAL at 21:05

## 2021-05-15 RX ADMIN — MEMANTINE 5 MG: 5 TABLET ORAL at 08:09

## 2021-05-15 RX ADMIN — BENZTROPINE MESYLATE 1 MG: 0.5 TABLET ORAL at 08:09

## 2021-05-15 RX ADMIN — Medication 800 UNITS: at 08:08

## 2021-05-15 RX ADMIN — DIVALPROEX SODIUM 500 MG: 500 TABLET, FILM COATED, EXTENDED RELEASE ORAL at 21:06

## 2021-05-15 RX ADMIN — LORAZEPAM 2 MG: 1 TABLET ORAL at 13:39

## 2021-05-15 RX ADMIN — DOCUSATE SODIUM 100 MG: 100 CAPSULE, LIQUID FILLED ORAL at 08:09

## 2021-05-15 ASSESSMENT — ACTIVITIES OF DAILY LIVING (ADL)
ADLS_ACUITY_SCORE: 13

## 2021-05-15 NOTE — PLAN OF CARE
DATE & TIME: 05/15/21   Cognitive Concerns/ Orientation : A/O x self, forgetful, drowsy.   BEHAVIOR & AGGRESSION TOOL COLOR: Yellow: hx of behavior issues, agitation, elopement.   CIWA SCORE: NA    ABNL VS/O2: VSS. RA.   MOBILITY: SBA with GB for unsteady gait.   PAIN MANAGMENT: Denies  DIET: Regular  BOWEL/BLADDER: Continent  ABNL LAB/BG: NA  DRAIN/DEVICES: NA   TELEMETRY RHYTHM: NA   SKIN: Intact. Finger nails trimmed this shift.   TESTS/PROCEDURES: NA   D/C DATE: Pending   Discharge Barriers: Guardianship and placement, SW involved.  OTHER IMPORTANT INFO: 5/13 Covid negative. Recheck MRSA swab on 5/17 (first one negative). FALL RISK r/t occ drowsiness, bed alarm on, door alarm off, sitter at bedside.   1339: Increasing restlessness and hallucinations/yelling, PRN ativan given.   1500: able to focus on playing cards, hallucinations decreased. Nicotine patch off.   1630: Pt asleep in bed.

## 2021-05-15 NOTE — PROGRESS NOTES
[facility]    Medicine Progress Note - Hospitalist Service       Date of Admission:  9/9/2020  Assessment & Plan       John Juárez is a 55 year old male admitted on 9/9/2020. PMHx of schizophrenia, hx of TBI, alcohol use disorder, COPD, hyperlipidemia and hypothyroidism who was admitted on 9/9/2020 for evaluation of aggressive behavior at his group home.    Was evaluated by Psychiatry and his condition stabilized, though his group home was unwilling to take him back and thus hospitalization has been prolonged awaiting new placement and guardianship. Under civil commitment through [facility].     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  Brought to hospital for evaluation of aggressive behaviors. Group home now unwilling to take him back. Has had numerous CODE 21s called this stay. Seen by psych, meds adjusted. Is currently under civil commitment and court hold through [facility] until 7/31/21.   --Continue on Haldol 10mg TID (timed for when patient tends to be most agitated), memantine 5mg daily, venlafaxine 37.5mg daily, benztropine 1mg BID   -- prns available for agitation ( Zyprexa and Ativan)  --Patient was seen by psychiatry on 5/10 given increased agitation episodes the previous day.  Psychiatry has decreased the dose of venlafaxine ER to 37.5 mg daily for 7 days with plans to discontinue after that.  Also started on Depakote  mg nightly for mood regulation.  Checked level on 5/12: adequate at 50.  -- SW following for placement, placement will occur once guardianship is established     Possible Tardive Dyskinesia vs EPSE  Per psych assessment on 4/1/21, noted to have possible tardive dyskinesia vs extrapyramidal side effects of meds. At that time, recommended to increase Cogentin to 1mg BID and add vitamin E 800 international unit(s) daily.      Back  cellulitis in 1/2021: Resolved  Noted on 1/24/21, initiated on cephalexin at that time. Received local wound care and completed 7-day course of antibiotics on 1/30     Baseline Hypotension  BP 90-110s systolic on average. Asymptomatic. No concerns     Hyperlipidemia  Chronic and stable on llow dose ASA and  atorvastatin     COPD  Not O2 dependent. Chronic and stable on salmeterol, albuterol prn     Hypothyroidism  Chronic and stable on levothyroxine     Tobacco use disorder  Using nicotine patch and PRN gum     Resting tremor of upper extremities  No acute issues      Hx of infective endocarditis with severe mitral and aortic regurgitation S/P bioprosthetic valve replacement (10/2015)  Systolic heart murmur  HFrEF, not in acute exacerbation: Follows with Dr. Dockery of [facility] V[facility], last seen in 1/2020. Note heart murmur on exam, strongest in aortic region. No CP, SOB, dizziness, syncope.   * Echocardiogram in 5/2016 with EF 50%, no evidence for prosthetic mitral and aortic valve dysfunction.   - Follow-up with outpatient Cardiology upon dismissal from the hospital (on every 2 year follow-ups)   - Continue ASA      Hx of CVA: Note basal ganglion stroke in 2015. No focal neuro deficits aside from cognitive dysfunction as above.          Diet: Room Service  Diet  Combination Diet Regular Diet Adult; Safe Tray - with utensils  Snacks/Supplements Adult: Other; Bedtime snack; Between Meals  Room Service    DVT Prophylaxis: Pneumatic Compression Devices  Valentin Catheter: not present  Code Status: Full Code           Disposition Plan   Expected discharge: pending guardianship and placement  Entered: Bora Walls DO 05/15/2021, 8:49 AM       The patient's care was discussed with the Patient.    Bora Walls DO  Hospitalist Service  [facility]  Contact information available via [facility]  Paging/Directory    ______________________________________________________________________    Interval History    Chart reviewed. No major outbursts. Awaiting placement.     Data reviewed today: I reviewed all medications, new labs and imaging results over the last 24 hours. I personally reviewed no images or EKG's today.    Physical Exam   Vital Signs: Temp: 98  F (36.7  C) Temp src: Oral BP: 113/70 Pulse: 92   Resp: 18 SpO2: 96 % O2 Device: None (Room air)    Weight: 185 lbs 9.6 oz  General Appearance: Well nourished male in NAD, laying on bed, appears comfortable  Respiratory: respirations non-labored on room air  Cardiovascular:   GI:   Skin:   Other:  alert, rambling speech, hard to follow conversation, disoriented x3    Data   No lab results found in last 7 days.

## 2021-05-15 NOTE — PLAN OF CARE
Cognitive Concerns/ Orientation : A/O x self, forgetful  BEHAVIOR & AGGRESSION TOOL COLOR: Yellow. PRN ativan and zyprexa given for agitation and restlessness.  CIWA SCORE: NA    ABNL VS/O2: VSS. RA.   MOBILITY: Ind in room  PAIN MANAGMENT: Denies  DIET: Regular  BOWEL/BLADDER: Continent  ABNL LAB/BG: NA  DRAIN/DEVICES: NA   TELEMETRY RHYTHM: NA   SKIN: intact   TESTS/PROCEDURES: NA   D/C DATE: Pending   Discharge Barriers: guardianship and placement. Social work following  OTHER IMPORTANT INFO: 5/13 Covid negative. Recheck MRSA swab on 5/17 (first one negative). Showered today.

## 2021-05-15 NOTE — PLAN OF CARE
DATE & TIME: 05/14/21 nights  Cognitive Concerns/ Orientation : AO to self, forgetful,   BEHAVIOR & AGGRESSION TOOL COLOR: green   CIWA SCORE: NA    ABNL VS/O2: VSS on RA   MOBILITY: Ind in room  PAIN MANAGMENT: Denies  DIET: Regular  BOWEL/BLADDER: Continent  ABNL LAB/BG: NA  DRAIN/DEVICES: NA   TELEMETRY RHYTHM: NA   SKIN: WDL  TESTS/PROCEDURES: NA   D/C DATE: Pending guardianship and placement  OTHER IMPORTANT INFO: 5/13 Covid negative. Recheck MRSA swab on 5/17 (first one negative). Sw following. Slept most of shift.

## 2021-05-16 PROCEDURE — 99232 SBSQ HOSP IP/OBS MODERATE 35: CPT | Performed by: HOSPITALIST

## 2021-05-16 PROCEDURE — 93005 ELECTROCARDIOGRAM TRACING: CPT

## 2021-05-16 PROCEDURE — 250N000013 HC RX MED GY IP 250 OP 250 PS 637: Performed by: PSYCHIATRY & NEUROLOGY

## 2021-05-16 PROCEDURE — 250N000013 HC RX MED GY IP 250 OP 250 PS 637: Performed by: NURSE PRACTITIONER

## 2021-05-16 PROCEDURE — 250N000013 HC RX MED GY IP 250 OP 250 PS 637: Performed by: HOSPITALIST

## 2021-05-16 PROCEDURE — 250N000013 HC RX MED GY IP 250 OP 250 PS 637: Performed by: INTERNAL MEDICINE

## 2021-05-16 PROCEDURE — 120N000001 HC R&B MED SURG/OB

## 2021-05-16 RX ADMIN — Medication 800 UNITS: at 08:29

## 2021-05-16 RX ADMIN — VENLAFAXINE HYDROCHLORIDE 37.5 MG: 37.5 CAPSULE, EXTENDED RELEASE ORAL at 08:29

## 2021-05-16 RX ADMIN — DOCUSATE SODIUM 100 MG: 100 CAPSULE, LIQUID FILLED ORAL at 08:29

## 2021-05-16 RX ADMIN — ATORVASTATIN CALCIUM 80 MG: 40 TABLET, FILM COATED ORAL at 21:05

## 2021-05-16 RX ADMIN — BENZTROPINE MESYLATE 1 MG: 0.5 TABLET ORAL at 08:29

## 2021-05-16 RX ADMIN — HALOPERIDOL 10 MG: 5 TABLET ORAL at 13:56

## 2021-05-16 RX ADMIN — BENZTROPINE MESYLATE 1 MG: 0.5 TABLET ORAL at 21:05

## 2021-05-16 RX ADMIN — DOCUSATE SODIUM 100 MG: 100 CAPSULE, LIQUID FILLED ORAL at 21:05

## 2021-05-16 RX ADMIN — SALMETEROL XINAFOATE 1 PUFF: 50 POWDER, METERED ORAL; RESPIRATORY (INHALATION) at 08:29

## 2021-05-16 RX ADMIN — MEMANTINE 5 MG: 5 TABLET ORAL at 08:29

## 2021-05-16 RX ADMIN — SALMETEROL XINAFOATE 1 PUFF: 50 POWDER, METERED ORAL; RESPIRATORY (INHALATION) at 21:07

## 2021-05-16 RX ADMIN — ASPIRIN 81 MG: 81 TABLET, COATED ORAL at 08:29

## 2021-05-16 RX ADMIN — HALOPERIDOL 10 MG: 5 TABLET ORAL at 08:29

## 2021-05-16 RX ADMIN — OLANZAPINE 10 MG: 5 TABLET, ORALLY DISINTEGRATING ORAL at 16:21

## 2021-05-16 RX ADMIN — DIVALPROEX SODIUM 500 MG: 500 TABLET, FILM COATED, EXTENDED RELEASE ORAL at 21:09

## 2021-05-16 RX ADMIN — LEVOTHYROXINE SODIUM 88 MCG: 88 TABLET ORAL at 08:29

## 2021-05-16 RX ADMIN — HALOPERIDOL 10 MG: 5 TABLET ORAL at 21:05

## 2021-05-16 ASSESSMENT — ACTIVITIES OF DAILY LIVING (ADL)
ADLS_ACUITY_SCORE: 16
ADLS_ACUITY_SCORE: 16
ADLS_ACUITY_SCORE: 15
ADLS_ACUITY_SCORE: 14
ADLS_ACUITY_SCORE: 15
ADLS_ACUITY_SCORE: 15

## 2021-05-16 NOTE — PLAN OF CARE
DATE & TIME: 05/15/21 1141-2054  Cognitive Concerns/ Orientation : Alert, confused, oriented to self only. Whispers, quiet voice. Forgetful. Intermittently speaking to auditory hallucinations. More alert during evening, frequently pushed in wheelchair in halls.  BEHAVIOR & AGGRESSION TOOL COLOR: Yellow: hx of behavior issues, agitation, elopement.   CIWA SCORE: NA    ABNL VS/O2: Daily vitals, VSS on RA  MOBILITY: SBA with GB. Very unsteady on feet overnight.  PAIN MANAGMENT: Denies  DIET: Regular  BOWEL/BLADDER: Continent, up to BR   ABNL LAB/BG: NA  DRAIN/DEVICES: None  TELEMETRY RHYTHM: NA   SKIN: Intact, WNL  TESTS/PROCEDURES: NA   D/C DATE: Pending   Discharge Barriers: Guardianship and placement, SW following  OTHER IMPORTANT INFO: 5/13 Covid negative. Contact precautions maintained for MRSA; reswab MRSA on 5/17 (first swab negative). Pt is under civil commitment and court hold through [facility] until 7/31/21.

## 2021-05-16 NOTE — PLAN OF CARE
[id number]  VSS on r/a, up and around unit in wheelchair with sitter for distraction. C/o dizziness with sitter. Will attempt orthostatics as patient agrees to participate.  Zyprexa x1 thi shift for agitation. EKG ordered, follow up in the morning with labs, per MD. No change in patient otherwise.

## 2021-05-16 NOTE — PROGRESS NOTES
[facility]    Medicine Progress Note - Hospitalist Service       Date of Admission:  9/9/2020  Assessment & Plan       John Juárez is a 55 year old male admitted on 9/9/2020. PMHx of schizophrenia, hx of TBI, alcohol use disorder, COPD, hyperlipidemia and hypothyroidism who was admitted on 9/9/2020 for evaluation of aggressive behavior at his group home.    Was evaluated by Psychiatry and his condition stabilized, though his group home was unwilling to take him back and thus hospitalization has been prolonged awaiting new placement and guardianship. Under civil commitment through [facility].     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  Brought to hospital for evaluation of aggressive behaviors. Group home now unwilling to take him back. Has had numerous CODE 21s called this stay. Seen by psych, meds adjusted. Is currently under civil commitment and court hold through [facility] until 7/31/21.   --Continue on Haldol 10mg TID (timed for when patient tends to be most agitated), memantine 5mg daily, venlafaxine 37.5mg daily, benztropine 1mg BID   -- prns available for agitation ( Zyprexa and Ativan)  --Patient was seen by psychiatry on 5/10 given increased agitation episodes the previous day.  Psychiatry has decreased the dose of venlafaxine ER to 37.5 mg daily for 7 days with plans to discontinue after that.  Also started on Depakote  mg nightly for mood regulation.  Checked level on 5/12: adequate at 50.  -- SW following for placement, placement will occur once guardianship is established  -- repeat psych consultation tomorrow when they are available due to increased somnolence. Of note, this may be preferred to the agitation that was noted last week.      Possible Tardive Dyskinesia vs EPSE  Per psych assessment on 4/1/21, noted to have possible tardive  dyskinesia vs extrapyramidal side effects of meds. At that time, recommended to increase Cogentin to 1mg BID and add vitamin E 800 international unit(s) daily.      Back cellulitis in 1/2021: Resolved  Noted on 1/24/21, initiated on cephalexin at that time. Received local wound care and completed 7-day course of antibiotics on 1/30     Baseline Hypotension  BP 90-110s systolic on average. Asymptomatic. No concerns     Hyperlipidemia  Chronic and stable on llow dose ASA and  atorvastatin     COPD  Not O2 dependent. Chronic and stable on salmeterol, albuterol prn     Hypothyroidism  Chronic and stable on levothyroxine     Tobacco use disorder  Using nicotine patch and PRN gum     Resting tremor of upper extremities  No acute issues      Hx of infective endocarditis with severe mitral and aortic regurgitation S/P bioprosthetic valve replacement (10/2015)  Systolic heart murmur  HFrEF, not in acute exacerbation: Follows with Dr. Dockery of Heart and Vascular Cardiology, last seen in 1/2020. Note heart murmur on exam, strongest in aortic region. No CP, SOB, dizziness, syncope.   * Echocardiogram in 5/2016 with EF 50%, no evidence for prosthetic mitral and aortic valve dysfunction.   - Follow-up with outpatient Cardiology upon dismissal from the hospital (on every 2 year follow-ups)   - Continue ASA      Hx of CVA: Note basal ganglion stroke in 2015. No focal neuro deficits aside from cognitive dysfunction as above.          Diet: Room Service  Diet  Combination Diet Regular Diet Adult; Safe Tray - with utensils  Snacks/Supplements Adult: Other; Bedtime snack; Between Meals  Room Service    DVT Prophylaxis: Pneumatic Compression Devices  Valentin Catheter: not present  Code Status: Full Code           Disposition Plan   Expected discharge: pending guardianship and placement  Entered: Bora Walls DO 05/16/2021, 10:45 AM       The patient's care was discussed with the Patient.    Bora Walls DO  Hospitalist  [facility]  Contact information available via [facility] Paging/Directory    ______________________________________________________________________    Interval History    Chart reviewed. No major outbursts. Nursing reports he is more lethargic than usual. Of note, he denies any significant symptosm including fever, chills, nausea, vomiting, weakness, chest pain , cough shortness of breath. He just states he feels tired.    Data reviewed today: I reviewed all medications, new labs and imaging results over the last 24 hours. I personally reviewed no images or EKG's today.    Physical Exam   Vital Signs: Temp: 97.6  F (36.4  C) Temp src: Oral BP: 117/78 Pulse: 86   Resp: 18 SpO2: 98 % O2 Device: None (Room air)    Weight: 185 lbs 9.6 oz  General Appearance: Well nourished male in NAD, laying on bed, appears comfortable  Respiratory: respirations non-labored on room air  Cardiovascular: RRR, no murmurs  GI: soft, nontender and nondistended with normal bowel sonds  Other:  alert, rambling speech, hard to follow conversation, disoriented x3. Moves all extremities equally and no facial asymmetry.     Data   No lab results found in last 7 days.

## 2021-05-16 NOTE — PLAN OF CARE
DATE & TIME: 05/16/21  Cognitive Concerns/ Orientation : Alert to self, baseline, content when awake, playing cards, amb in cruz, occ resting in bed between cares.    BEHAVIOR & AGGRESSION TOOL COLOR: Yellow: hx of behavior issues, agitation, elopement.   CIWA SCORE: NA    ABNL VS/O2: Daily vitals, VSS on RA  MOBILITY: SBA with GB. Gait improving.   PAIN MANAGMENT: Denies  DIET: Regular  BOWEL/BLADDER: Continent, up to BR   ABNL LAB/BG: NA   DRAIN/DEVICES: None  TELEMETRY RHYTHM: NA   SKIN: Intact, WNL  TESTS/PROCEDURES: NA   D/C DATE: Pending   Discharge Barriers: Guardianship and placement, SW following. Civil commitment and court hold through [facility] until 7/31/21.   OTHER IMPORTANT INFO: Contact precautions maintained for MRSA; reswab MRSA on 5/17 (first swab negative). Neuros intact. Hallucinations present, pt redirectable and not agitated, no PRN meds needed. Sitter at bedside.

## 2021-05-17 LAB
ANION GAP SERPL CALCULATED.3IONS-SCNC: 5 MMOL/L (ref 3–14)
BUN SERPL-MCNC: 8 MG/DL (ref 7–30)
CALCIUM SERPL-MCNC: 8.3 MG/DL (ref 8.5–10.1)
CHLORIDE SERPL-SCNC: 112 MMOL/L (ref 94–109)
CO2 SERPL-SCNC: 23 MMOL/L (ref 20–32)
CREAT SERPL-MCNC: 0.68 MG/DL (ref 0.66–1.25)
ERYTHROCYTE [DISTWIDTH] IN BLOOD BY AUTOMATED COUNT: 14.2 % (ref 10–15)
GFR SERPL CREATININE-BSD FRML MDRD: >90 ML/MIN/{1.73_M2}
GLUCOSE SERPL-MCNC: 121 MG/DL (ref 70–99)
HCT VFR BLD AUTO: 45.1 % (ref 40–53)
HGB BLD-MCNC: 15.9 G/DL (ref 13.3–17.7)
MCH RBC QN AUTO: 32.6 PG (ref 26.5–33)
MCHC RBC AUTO-ENTMCNC: 35.3 G/DL (ref 31.5–36.5)
MCV RBC AUTO: 92 FL (ref 78–100)
MRSA DNA SPEC QL NAA+PROBE: NEGATIVE
PLATELET # BLD AUTO: 186 10E9/L (ref 150–450)
POTASSIUM SERPL-SCNC: 4 MMOL/L (ref 3.4–5.3)
RBC # BLD AUTO: 4.88 10E12/L (ref 4.4–5.9)
SODIUM SERPL-SCNC: 140 MMOL/L (ref 133–144)
SPECIMEN SOURCE: NORMAL
WBC # BLD AUTO: 5.4 10E9/L (ref 4–11)

## 2021-05-17 PROCEDURE — 250N000013 HC RX MED GY IP 250 OP 250 PS 637: Performed by: PSYCHIATRY & NEUROLOGY

## 2021-05-17 PROCEDURE — 250N000013 HC RX MED GY IP 250 OP 250 PS 637: Performed by: HOSPITALIST

## 2021-05-17 PROCEDURE — 87641 MR-STAPH DNA AMP PROBE: CPT | Performed by: HOSPITALIST

## 2021-05-17 PROCEDURE — 80048 BASIC METABOLIC PNL TOTAL CA: CPT | Performed by: HOSPITALIST

## 2021-05-17 PROCEDURE — 250N000013 HC RX MED GY IP 250 OP 250 PS 637: Performed by: NURSE PRACTITIONER

## 2021-05-17 PROCEDURE — 99232 SBSQ HOSP IP/OBS MODERATE 35: CPT | Performed by: HOSPITALIST

## 2021-05-17 PROCEDURE — 36415 COLL VENOUS BLD VENIPUNCTURE: CPT | Performed by: HOSPITALIST

## 2021-05-17 PROCEDURE — 87640 STAPH A DNA AMP PROBE: CPT | Performed by: HOSPITALIST

## 2021-05-17 PROCEDURE — 250N000013 HC RX MED GY IP 250 OP 250 PS 637: Performed by: INTERNAL MEDICINE

## 2021-05-17 PROCEDURE — 120N000001 HC R&B MED SURG/OB

## 2021-05-17 PROCEDURE — 99232 SBSQ HOSP IP/OBS MODERATE 35: CPT | Performed by: PSYCHIATRY & NEUROLOGY

## 2021-05-17 PROCEDURE — 85027 COMPLETE CBC AUTOMATED: CPT | Performed by: HOSPITALIST

## 2021-05-17 RX ORDER — OLANZAPINE 5 MG/1
5 TABLET, ORALLY DISINTEGRATING ORAL 2 TIMES DAILY
Status: DISCONTINUED | OUTPATIENT
Start: 2021-05-17 | End: 2021-05-24

## 2021-05-17 RX ORDER — HALOPERIDOL 5 MG/1
5 TABLET ORAL 3 TIMES DAILY
Status: DISCONTINUED | OUTPATIENT
Start: 2021-05-17 | End: 2021-05-23

## 2021-05-17 RX ORDER — WATER 10 ML/10ML
INJECTION INTRAMUSCULAR; INTRAVENOUS; SUBCUTANEOUS
Status: DISCONTINUED
Start: 2021-05-17 | End: 2021-05-17 | Stop reason: WASHOUT

## 2021-05-17 RX ADMIN — DOCUSATE SODIUM 100 MG: 100 CAPSULE, LIQUID FILLED ORAL at 08:09

## 2021-05-17 RX ADMIN — OLANZAPINE 10 MG: 5 TABLET, ORALLY DISINTEGRATING ORAL at 14:59

## 2021-05-17 RX ADMIN — BENZTROPINE MESYLATE 1 MG: 0.5 TABLET ORAL at 08:09

## 2021-05-17 RX ADMIN — Medication 800 UNITS: at 08:09

## 2021-05-17 RX ADMIN — OLANZAPINE 5 MG: 5 TABLET, ORALLY DISINTEGRATING ORAL at 10:28

## 2021-05-17 RX ADMIN — MEMANTINE 5 MG: 5 TABLET ORAL at 08:09

## 2021-05-17 RX ADMIN — HALOPERIDOL 5 MG: 5 TABLET ORAL at 21:06

## 2021-05-17 RX ADMIN — SALMETEROL XINAFOATE 1 PUFF: 50 POWDER, METERED ORAL; RESPIRATORY (INHALATION) at 21:06

## 2021-05-17 RX ADMIN — HALOPERIDOL 5 MG: 5 TABLET ORAL at 13:54

## 2021-05-17 RX ADMIN — LEVOTHYROXINE SODIUM 88 MCG: 88 TABLET ORAL at 08:09

## 2021-05-17 RX ADMIN — NICOTINE POLACRILEX 2 MG: 2 LOZENGE ORAL at 15:03

## 2021-05-17 RX ADMIN — HALOPERIDOL 10 MG: 5 TABLET ORAL at 08:09

## 2021-05-17 RX ADMIN — ATORVASTATIN CALCIUM 80 MG: 40 TABLET, FILM COATED ORAL at 21:06

## 2021-05-17 RX ADMIN — VENLAFAXINE HYDROCHLORIDE 37.5 MG: 37.5 CAPSULE, EXTENDED RELEASE ORAL at 08:10

## 2021-05-17 RX ADMIN — SALMETEROL XINAFOATE 1 PUFF: 50 POWDER, METERED ORAL; RESPIRATORY (INHALATION) at 08:10

## 2021-05-17 RX ADMIN — OLANZAPINE 5 MG: 5 TABLET, ORALLY DISINTEGRATING ORAL at 21:06

## 2021-05-17 RX ADMIN — ASPIRIN 81 MG: 81 TABLET, COATED ORAL at 08:11

## 2021-05-17 RX ADMIN — DIVALPROEX SODIUM 750 MG: 500 TABLET, FILM COATED, EXTENDED RELEASE ORAL at 21:06

## 2021-05-17 RX ADMIN — DOCUSATE SODIUM 100 MG: 100 CAPSULE, LIQUID FILLED ORAL at 21:06

## 2021-05-17 RX ADMIN — BENZTROPINE MESYLATE 1 MG: 0.5 TABLET ORAL at 21:06

## 2021-05-17 ASSESSMENT — ACTIVITIES OF DAILY LIVING (ADL)
ADLS_ACUITY_SCORE: 13

## 2021-05-17 NOTE — PROGRESS NOTES
[facility]    Medicine Progress Note - Hospitalist Service       Date of Admission:  9/9/2020  Assessment & Plan       John Juárez is a 55 year old male admitted on 9/9/2020. PMHx of schizophrenia, hx of TBI, alcohol use disorder, COPD, hyperlipidemia and hypothyroidism who was admitted on 9/9/2020 for evaluation of aggressive behavior at his group home.    Was evaluated by Psychiatry and his condition stabilized, though his group home was unwilling to take him back and thus hospitalization has been prolonged awaiting new placement and guardianship. Under civil commitment through [facility].     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  Brought to hospital for evaluation of aggressive behaviors. Group home now unwilling to take him back. Has had numerous CODE 21s called this stay. Seen by psych, meds adjusted. Is currently under civil commitment and court hold through [facility] until 7/31/21.   plan  --haldol reduced ot 5 mg tid 5/17  -- benztropine 1mg BID   -- zyprexa scheduled  -- prns available for agitation ( Zyprexa)  -- increased valproic acid to 750 mg daily  -- SW following for placement, placement will occur once guardianship is established  -- last psych visit on 5/17    Possible Tardive Dyskinesia vs EPSE  Per psych assessment on 4/1/21, noted to have possible tardive dyskinesia vs extrapyramidal side effects of meds. At that time, recommended to increase Cogentin to 1mg BID and add vitamin E 800 international unit(s) daily.      Back cellulitis in 1/2021: Resolved  Noted on 1/24/21, initiated on cephalexin at that time. Received local wound care and completed 7-day course of antibiotics on 1/30     Baseline Hypotension  BP 90-110s systolic on average. Asymptomatic. No concerns     Hyperlipidemia  Chronic and stable on llow dose ASA and   atorvastatin     COPD  Not O2 dependent. Chronic and stable on salmeterol, albuterol prn     Hypothyroidism  Chronic and stable on levothyroxine     Tobacco use disorder  Using nicotine patch and PRN gum     Resting tremor of upper extremities  No acute issues      Hx of infective endocarditis with severe mitral and aortic regurgitation S/P bioprosthetic valve replacement (10/2015)  Systolic heart murmur  HFrEF, not in acute exacerbation: Follows with Dr. Dockery of [facility] V[facility], last seen in 1/2020. Note heart murmur on exam, strongest in aortic region. No CP, SOB, dizziness, syncope.   * Echocardiogram in 5/2016 with EF 50%, no evidence for prosthetic mitral and aortic valve dysfunction.   - Follow-up with outpatient Cardiology upon dismissal from the hospital (on every 2 year follow-ups)   - Continue ASA      Hx of CVA: Note basal ganglion stroke in 2015. No focal neuro deficits aside from cognitive dysfunction as above.          Diet: Room Service  Diet  Combination Diet Regular Diet Adult; Safe Tray - with utensils  Snacks/Supplements Adult: Other; Bedtime snack; Between Meals  Room Service    DVT Prophylaxis: Pneumatic Compression Devices  Valentin Catheter: not present  Code Status: Full Code           Disposition Plan   Expected discharge: pending guardianship and placement  Entered: Bora Walls DO 05/17/2021, 2:25 PM       The patient's care was discussed with the Patient.    Bora Walls DO  Hospitalist Service  [facility]  Contact information available via [facility] Paging/Directory    ______________________________________________________________________    Interval History    Chart reviewed. No major outbursts. More lethargic, likely secondary to prns. nofocal examination     Data reviewed today: I reviewed all medications, new labs and imaging results over the last 24 hours. I personally reviewed no images or EKG's today.    Physical Exam   Vital  Signs: Temp: 97.5  F (36.4  C) Temp src: Oral BP: 113/80 Pulse: 90   Resp: 16 SpO2: 96 % O2 Device: None (Room air)    Weight: 185 lbs 9.6 oz  General Appearance: Well nourished male in NAD, laying on bed, appears comfortable  Respiratory: respirations non-labored on room air  Cardiovascular: RRR, no murmurs  GI: soft, nontender and nondistended with normal bowel sonds  Other:  alert, rambling speech, hard to follow conversation, disoriented x3. Moves all extremities equally and no facial asymmetry.     Data   Recent Labs   Lab 05/17/21  0805   WBC 5.4   HGB 15.9   MCV 92         POTASSIUM 4.0   CHLORIDE 112*   CO2 23   BUN 8   CR 0.68   ANIONGAP 5   LESLEE 8.3*   *

## 2021-05-17 NOTE — PLAN OF CARE
DATE & TIME: 5/17/2021 7714-9942    Cognitive Concerns/ Orientation : A&Ox 1, alert to self, baseline, lethargic this AM, more awake in afternoon. Neuros intact except slightly unsteady gait. Denies dizziness. Hallucinations present.  BEHAVIOR & AGGRESSION TOOL COLOR: Yellow, hx of behavior issues, agitation, elopement   ABNL VS/O2: VSS on RA  MOBILITY: SBA with gait belt, fall risk. Gait improving. Ambulated in halls  PAIN MANAGMENT: Denies  DIET: Regular  BOWEL/BLADDER: Continent  SKIN: Intact  D/C DATE: Discharge pending guardianship and placement. Civil commitment through [facility] until 7/31/21  OTHER IMPORTANT INFO:  MRSA reswab negative- called IP to discontinue isolation. Psych saw patient, discontinued ativan, started on scheduled zyprexa BID, decreased scheduled haldol. Code 21 called around 1500 as patient ran down stairwell again, was re-directable to room and took zyprexa ODT. Nicotine lozenge given as patient is upset he cannot have a cigarette

## 2021-05-17 NOTE — CODE/RAPID RESPONSE
[facility]    RRT Note: CODE 21  5/17/2021   Time Called: 1452      Assessment & Plan     Behavior disturbance secondary to neurocognitive disorder  Code 21 called as the patient attempted to elope down the stairwell. He was being escorted by nursing staff and security back to station 66 from the ED upon my arrival to the unit. He returned to his room and received PRN zyprexa which was previously ordered. Nursing staff report the patient has been more escalated and does have a bedside sitter. The nursing assistant did report the patient was starting to escalate and she attempted distraction techniques but was unsuccessful.    INTERVENTIONS:  -Zyprexa 10mg ODT per PRN order    At the end of the RRT will remain on station 66.    Discussed with and defer further cares to bedside nursing staff.    MAIA Walton CNP

## 2021-05-17 NOTE — PROGRESS NOTES
Infection Prevention Progress Note  5/17/2021      Patient Name: John Juárez [id number]  Admit Date: 9/9/2020    Infection Status as of 5/17/2021 1:33 PM: MRSA  Isolation Status as of 5/17/2021 1:33 PM: Contact     MDRO Discontinuation  Infection Prevention has reviewed this patient's chart per the MDRO D/C Policy and have taken the following action:    Patient meets all the criteria for discontinuation and Infection Prevention will resolve the MRSA infection status.    **completed 2 negative nares a week apart and both negative. No chronic wounds or chronic invasive devices. Does not come from a acute LTC facility.     If the patient develops a MDRO infection or there becomes concern please notify IP.

## 2021-05-17 NOTE — CONSULTS
"[facility] Psychiatric Consult Progress Note    Interval History:   I met with the patient on station 66.  Routine follow-up was requested with respect to his psychiatric medications, and ongoing concern that he gets intermittently agitated requiring IM medications to manage him.  He is currently on Depakote, his level came back at 50.  This can be titrated.  There is concerned that he is more sedated, getting a fair amount of Ativan.  At this juncture given age, fall risk, somnolence I would favor stopping the Ativan, scheduling Zyprexa and optimizing Depakote.  As needed IM Zyprexa is available.     Review of systems:   10 point Review of Systems completed by Sushil Wilks MD , and is  is negative other than noted in the HPI     Medications:       aspirin  81 mg Oral Daily     atorvastatin  80 mg Oral At Bedtime     benztropine  1 mg Oral BID     divalproex sodium extended-release  750 mg Oral At Bedtime     docusate sodium  100 mg Oral BID     haloperidol  5 mg Oral TID     levothyroxine  88 mcg Oral Daily     memantine  5 mg Oral Daily     OLANZapine zydis  5 mg Oral BID     salmeterol  1 puff Inhalation BID     vitamin E  800 Units Oral Daily     acetaminophen, albuterol, alum & mag hydroxide-simethicone, melatonin, naloxone **OR** naloxone **OR** naloxone **OR** naloxone, nicotine, nicotine, OLANZapine, OLANZapine zydis, ondansetron **OR** ondansetron, polyethylene glycol    Mental Status Examination:   Appearance:  awake, alert  Eye Contact: Intense, he tends to stare blankly at times  Speech:  Impoverished, dysarthric, monotone, speaks in a whisper  language: Poor  Psychomotor Behavior:  some buccal-lingual dyskinesia, chewing movements  Mood: \"Mumbling\"  affect: Blunted   thought Process:  paucity of speech, no obvious loosening of associations flight of ideas or formal thought disorder  Thought Content:  no evidence of suicidal ideation or homicidal ideation, but at times he " "does tend to stare off into space, looks a guarded, but pleasant  Oriented to: Person only, he thought he was at the [facility] stating \"I am at [facility]\"  Attention Span and Concentration:  poor  Recent and Remote Memory:  limited  Fund of Knowledge: delayed  Insight:  limited  Judgment:  limited       Labs/Vitals:     Recent Results (from the past 24 hour(s))   EKG 12-lead, tracing only    Collection Time: 05/16/21  5:27 PM   Result Value Ref Range    Interpretation ECG Click View Image link to view waveform and result    CBC with platelets    Collection Time: 05/17/21  8:05 AM   Result Value Ref Range    WBC 5.4 4.0 - 11.0 10e9/L    RBC Count 4.88 4.4 - 5.9 10e12/L    Hemoglobin 15.9 13.3 - 17.7 g/dL    Hematocrit 45.1 40.0 - 53.0 %    MCV 92 78 - 100 fl    MCH 32.6 26.5 - 33.0 pg    MCHC 35.3 31.5 - 36.5 g/dL    RDW 14.2 10.0 - 15.0 %    Platelet Count 186 150 - 450 10e9/L     B/P: 123/95, T: 97.6, P: 89, R: 18  Impression  John is a 55-year-old gentleman with a known major neurocognitive disorder and unspecified psychotic illness.  He continues to have significant agitation on an intermittent basis.  We will do our best to optimize Depakote, lowering the dose of Haldol and stop Ativan.  I added a scheduled dose of Zyprexa hoping this would optimize mood regulation without over sedating him    DIagnoses:   1.  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   2.  Other schizophrenia spectrum disorder.   3.  Alcohol use disorder in remission.   4.  Stimulant use disorder, in remission.   5.  Chronic obstructive pulmonary disease.   6.  Hypertension.   7. R/O mild TD vs EPSE       Plan:   1.  Effexor has been discontinued  2.  Optimize Depakote ER at 750 mg nightly  3.  Continue with door alarm, he is under commitment, it looks like they are pursuing guardianship  4.  Monitor sedation, I discontinued the Ativan, we will schedule Zyprexa Zydis 5 " mg twice daily, lower the Haldol to 5 mg 3 times daily and see how things go  Attestation:  Patient has been seen and evaluated by me,  Sushil Wilks MD    Visit/Communication Style   In person, face-to-face

## 2021-05-18 LAB — INTERPRETATION ECG - MUSE: NORMAL

## 2021-05-18 PROCEDURE — 99231 SBSQ HOSP IP/OBS SF/LOW 25: CPT | Performed by: INTERNAL MEDICINE

## 2021-05-18 PROCEDURE — 250N000013 HC RX MED GY IP 250 OP 250 PS 637: Performed by: INTERNAL MEDICINE

## 2021-05-18 PROCEDURE — 120N000001 HC R&B MED SURG/OB

## 2021-05-18 PROCEDURE — 250N000013 HC RX MED GY IP 250 OP 250 PS 637: Performed by: NURSE PRACTITIONER

## 2021-05-18 PROCEDURE — 250N000013 HC RX MED GY IP 250 OP 250 PS 637: Performed by: PSYCHIATRY & NEUROLOGY

## 2021-05-18 RX ADMIN — BENZTROPINE MESYLATE 1 MG: 0.5 TABLET ORAL at 08:56

## 2021-05-18 RX ADMIN — OLANZAPINE 5 MG: 5 TABLET, ORALLY DISINTEGRATING ORAL at 20:56

## 2021-05-18 RX ADMIN — DOCUSATE SODIUM 100 MG: 100 CAPSULE, LIQUID FILLED ORAL at 20:57

## 2021-05-18 RX ADMIN — HALOPERIDOL 5 MG: 5 TABLET ORAL at 19:35

## 2021-05-18 RX ADMIN — SALMETEROL XINAFOATE 1 PUFF: 50 POWDER, METERED ORAL; RESPIRATORY (INHALATION) at 20:59

## 2021-05-18 RX ADMIN — OLANZAPINE 10 MG: 5 TABLET, ORALLY DISINTEGRATING ORAL at 18:22

## 2021-05-18 RX ADMIN — ATORVASTATIN CALCIUM 80 MG: 40 TABLET, FILM COATED ORAL at 20:56

## 2021-05-18 RX ADMIN — HALOPERIDOL 5 MG: 5 TABLET ORAL at 08:55

## 2021-05-18 RX ADMIN — SALMETEROL XINAFOATE 1 PUFF: 50 POWDER, METERED ORAL; RESPIRATORY (INHALATION) at 08:59

## 2021-05-18 RX ADMIN — OLANZAPINE 10 MG: 5 TABLET, ORALLY DISINTEGRATING ORAL at 10:02

## 2021-05-18 RX ADMIN — DIVALPROEX SODIUM 750 MG: 500 TABLET, FILM COATED, EXTENDED RELEASE ORAL at 20:56

## 2021-05-18 RX ADMIN — DOCUSATE SODIUM 100 MG: 100 CAPSULE, LIQUID FILLED ORAL at 08:55

## 2021-05-18 RX ADMIN — LEVOTHYROXINE SODIUM 88 MCG: 88 TABLET ORAL at 08:55

## 2021-05-18 RX ADMIN — BENZTROPINE MESYLATE 1 MG: 0.5 TABLET ORAL at 20:56

## 2021-05-18 RX ADMIN — Medication 800 UNITS: at 08:55

## 2021-05-18 RX ADMIN — MEMANTINE 5 MG: 5 TABLET ORAL at 08:55

## 2021-05-18 RX ADMIN — OLANZAPINE 5 MG: 5 TABLET, ORALLY DISINTEGRATING ORAL at 08:55

## 2021-05-18 RX ADMIN — HALOPERIDOL 5 MG: 5 TABLET ORAL at 13:52

## 2021-05-18 RX ADMIN — ASPIRIN 81 MG: 81 TABLET, COATED ORAL at 08:55

## 2021-05-18 ASSESSMENT — ACTIVITIES OF DAILY LIVING (ADL)
ADLS_ACUITY_SCORE: 13

## 2021-05-18 NOTE — PROGRESS NOTES
[facility]    Medicine Progress Note - Hospitalist Service       Date of Admission:  9/9/2020  Assessment & Plan       John Juárez is a 55 year old male admitted on 9/9/2020. PMHx of schizophrenia, hx of TBI, alcohol use disorder, COPD, hyperlipidemia and hypothyroidism who was admitted on 9/9/2020 for evaluation of aggressive behavior at his group home.    Was evaluated by Psychiatry and his condition stabilized, though his group home was unwilling to take him back and thus hospitalization has been prolonged awaiting new placement and guardianship. Under civil commitment through [facility].     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  Brought to hospital for evaluation of aggressive behaviors. Group home now unwilling to take him back. Has had numerous CODE 21s called this stay. Seen by psych, meds adjusted. Is currently under civil commitment and court hold through [facility] until 7/31/21.   plan  --haldol reduced ot 5 mg tid 5/17  -- benztropine 1mg BID   -- zyprexa scheduled  -- prns available for agitation ( Zyprexa)  -- increased valproic acid to 750 mg daily  -- SW following for placement, placement will occur once guardianship is established  -- last psych visit on 5/17    Possible Tardive Dyskinesia vs EPSE  Per psych assessment on 4/1/21, noted to have possible tardive dyskinesia vs extrapyramidal side effects of meds. At that time, recommended to increase Cogentin to 1mg BID and add vitamin E 800 international unit(s) daily.      Back cellulitis in 1/2021: Resolved  Noted on 1/24/21, initiated on cephalexin at that time. Received local wound care and completed 7-day course of antibiotics on 1/30     Baseline Hypotension  BP 90-110s systolic on average. Asymptomatic. No concerns     Hyperlipidemia  Chronic and stable on llow dose ASA and  " atorvastatin     COPD  Not O2 dependent. Chronic and stable on salmeterol, albuterol prn     Hypothyroidism  Chronic and stable on levothyroxine     Tobacco use disorder  Using nicotine patch and PRN gum     Resting tremor of upper extremities  No acute issues      Hx of infective endocarditis with severe mitral and aortic regurgitation S/P bioprosthetic valve replacement (10/2015)  Systolic heart murmur  HFrEF, not in acute exacerbation: Follows with Dr. Dockery of Heart and V[facility] Cardiology, last seen in 1/2020. Note heart murmur on exam, strongest in aortic region. No CP, SOB, dizziness, syncope.   * Echocardiogram in 5/2016 with EF 50%, no evidence for prosthetic mitral and aortic valve dysfunction.   - Follow-up with outpatient Cardiology upon dismissal from the hospital (on every 2 year follow-ups)   - Continue ASA      Hx of CVA: Note basal ganglia stroke in 2015. No focal neuro deficits aside from cognitive dysfunction as above.          Diet: Room Service  Diet  Combination Diet Regular Diet Adult; Safe Tray - with utensils  Snacks/Supplements Adult: Other; Bedtime snack; Between Meals  Room Service    DVT Prophylaxis: Pneumatic Compression Devices  Valentin Catheter: not present  Code Status: Full Code           Disposition Plan   Expected discharge: pending guardianship and placement  Entered: Yolanda Trinh MD 05/18/2021, 8:35 AM       The patient's care was discussed with the Patient.    Yolanda Trinh MD  Hospitalist Service  [facility]  Contact information available via [facility] Paging/Directory    ______________________________________________________________________    Interval History    \"Go away.  Get out of here.\"  Patient says he is frustrated, \"why remain here so for so long?\"  He has no new respiratory or GI complaints    Data reviewed today: I reviewed all medications, new labs and imaging results over the last 24 hours. I personally reviewed no images or EKG's " today.    Physical Exam   Vital Signs: Temp: 97.5  F (36.4  C) Temp src: Oral BP: 123/89 Pulse: 100   Resp: 16 SpO2: 100 % O2 Device: None (Room air)    Weight: 185 lbs 9.6 oz  General Appearance: Well nourished male in NAD, laying on bed, appears comfortable  Respiratory: respirations non-labored   Cardiovascular: Exam deferred  GI: Exam deferred  Other:  alert, rambling speech, hard to follow conversation, oriented to self moves all extremities equally and no facial asymmetry.     Data   Recent Labs   Lab 05/17/21  0805   WBC 5.4   HGB 15.9   MCV 92         POTASSIUM 4.0   CHLORIDE 112*   CO2 23   BUN 8   CR 0.68   ANIONGAP 5   LESLEE 8.3*   *

## 2021-05-18 NOTE — CODE/RAPID RESPONSE
House NP note    This is a 56 yo M w/ PMH of  schizophrenia, major neurocognitive disorder due to a hx of TBI and alcohol use disorder, COPD, hyperlipidemia and hypothyroidism who was admitted on 9/9/2020 for evaluation of aggressive behavior at his group home.  He is under civil commitment, psychiatry is following prn w/ recent adjustment (down titration) of haldol, cessation of lorazepam and scheduling of zyprexa zydis.    I responded to code 21 as pt was attempting to leave unit to go for a smoke.  By the time of my arrival pt had been returned to room by unit staff w/ verbal redirection.  He did not attempt to harm any staff or himself.  He started to walk out of room in my presence but was agreeable to return to his room to receive a nicotine lozenge.  Thereafter pt returned to bed independently.    Interventions:    -prn nicotine as above  -minimal stimulation so door was closed, alarm mgmt as per RN staff  -I did not examine pt  -prn zyprexa available  -I will ensure w/ RN staff that pt has recently been offered food/drink/toileting    No charge    Viviane Donald CNP  Hospitalist - [facility]  [phone]     Text Page

## 2021-05-18 NOTE — PLAN OF CARE
DATE & TIME: 5/18/2021 0700-1930   Cognitive Concerns/ Orientation : A&O to self only, Neuros intact.  BEHAVIOR & AGGRESSION TOOL COLOR: Yellow, tried to leave floor x1, received prn Zyprexa 10 mg x1, calm and cooperative rest of the sift. hx of behavior issues, agitation, elopement.    ABNL VS/O2: VSS on RA  MOBILITY: Up independently, steady.  PAIN MANAGMENT: Denies  DIET: Regular  BOWEL/BLADDER: Continent  SKIN: Intact  D/C DATE: Discharge pending guardianship and placement. Civil commitment through [facility] until 7/31/21  Discharge Barriers: gaurdianship  OTHER IMPORTANT INFO: Pt was restless and kept asking for cigarette this morning, tried to leave the floor, given prn oral Zyprexa 10 mg x1, Code 21 called, but pt returned back to room independently. Calm and cooperative rest of the shift.     Update 7685-5408  Pt was little agitated this shift, he threw a coke cane at his sitter, given prn Zyprexa 10 mg x1.

## 2021-05-18 NOTE — PLAN OF CARE
DATE & TIME: 5/17/2021 1900-0730    Cognitive Concerns/ Orientation : A&Ox 1, alert to self, baseline. Neuros intact except slightly unsteady gait. Denies dizziness. Hallucinations present.  BEHAVIOR & AGGRESSION TOOL COLOR: Yellow, hx of behavior issues, agitation, elopement   ABNL VS/O2: VSS on RA  MOBILITY: SBA with gait belt, fall risk. Gait improving  PAIN MANAGMENT: Denies  DIET: Regular  BOWEL/BLADDER: Continent  SKIN: Intact  D/C DATE: Discharge pending guardianship and placement. Civil commitment through [facility] until 7/31/21  Discharge Barriers: gaurdianship  OTHER IMPORTANT INFO: Psych saw patient, discontinued ativan, started on scheduled zyprexa BID, decreased scheduled haldol

## 2021-05-19 PROCEDURE — 250N000013 HC RX MED GY IP 250 OP 250 PS 637: Performed by: PSYCHIATRY & NEUROLOGY

## 2021-05-19 PROCEDURE — 250N000013 HC RX MED GY IP 250 OP 250 PS 637: Performed by: HOSPITALIST

## 2021-05-19 PROCEDURE — 99231 SBSQ HOSP IP/OBS SF/LOW 25: CPT | Performed by: INTERNAL MEDICINE

## 2021-05-19 PROCEDURE — 250N000013 HC RX MED GY IP 250 OP 250 PS 637: Performed by: INTERNAL MEDICINE

## 2021-05-19 PROCEDURE — 120N000001 HC R&B MED SURG/OB

## 2021-05-19 PROCEDURE — 250N000013 HC RX MED GY IP 250 OP 250 PS 637: Performed by: NURSE PRACTITIONER

## 2021-05-19 RX ADMIN — MEMANTINE 5 MG: 5 TABLET ORAL at 09:10

## 2021-05-19 RX ADMIN — ASPIRIN 81 MG: 81 TABLET, COATED ORAL at 09:10

## 2021-05-19 RX ADMIN — BENZTROPINE MESYLATE 1 MG: 0.5 TABLET ORAL at 22:10

## 2021-05-19 RX ADMIN — SALMETEROL XINAFOATE 1 PUFF: 50 POWDER, METERED ORAL; RESPIRATORY (INHALATION) at 22:13

## 2021-05-19 RX ADMIN — MELATONIN TAB 3 MG 3 MG: 3 TAB at 23:52

## 2021-05-19 RX ADMIN — OLANZAPINE 5 MG: 5 TABLET, ORALLY DISINTEGRATING ORAL at 22:10

## 2021-05-19 RX ADMIN — OLANZAPINE 5 MG: 5 TABLET, ORALLY DISINTEGRATING ORAL at 23:52

## 2021-05-19 RX ADMIN — HALOPERIDOL 5 MG: 5 TABLET ORAL at 13:28

## 2021-05-19 RX ADMIN — OLANZAPINE 5 MG: 5 TABLET, ORALLY DISINTEGRATING ORAL at 09:10

## 2021-05-19 RX ADMIN — HALOPERIDOL 5 MG: 5 TABLET ORAL at 22:11

## 2021-05-19 RX ADMIN — DIVALPROEX SODIUM 750 MG: 500 TABLET, FILM COATED, EXTENDED RELEASE ORAL at 22:11

## 2021-05-19 RX ADMIN — SALMETEROL XINAFOATE 1 PUFF: 50 POWDER, METERED ORAL; RESPIRATORY (INHALATION) at 09:10

## 2021-05-19 RX ADMIN — DOCUSATE SODIUM 100 MG: 100 CAPSULE, LIQUID FILLED ORAL at 22:11

## 2021-05-19 RX ADMIN — DOCUSATE SODIUM 100 MG: 100 CAPSULE, LIQUID FILLED ORAL at 09:10

## 2021-05-19 RX ADMIN — ATORVASTATIN CALCIUM 80 MG: 40 TABLET, FILM COATED ORAL at 22:10

## 2021-05-19 RX ADMIN — BENZTROPINE MESYLATE 1 MG: 0.5 TABLET ORAL at 09:10

## 2021-05-19 RX ADMIN — LEVOTHYROXINE SODIUM 88 MCG: 88 TABLET ORAL at 09:10

## 2021-05-19 RX ADMIN — HALOPERIDOL 5 MG: 5 TABLET ORAL at 09:10

## 2021-05-19 RX ADMIN — Medication 800 UNITS: at 09:10

## 2021-05-19 ASSESSMENT — ACTIVITIES OF DAILY LIVING (ADL)
ADLS_ACUITY_SCORE: 13

## 2021-05-19 NOTE — PROGRESS NOTES
[facility]    Medicine Progress Note - Hospitalist Service       Date of Admission:  9/9/2020  Assessment & Plan       John Juárez is a 55 year old male admitted on 9/9/2020. PMHx of schizophrenia, hx of TBI, alcohol use disorder, COPD, hyperlipidemia and hypothyroidism who was admitted on 9/9/2020 for evaluation of aggressive behavior at his group home.    Was evaluated by Psychiatry and his condition stabilized, though his group home was unwilling to take him back and thus hospitalization has been prolonged awaiting new placement and guardianship. Under civil commitment through [facility].     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  Brought to hospital for evaluation of aggressive behaviors. Group home now unwilling to take him back. Has had numerous CODE 21s called this stay. Seen by psych, meds adjusted. Is currently under civil commitment and court hold through [facility] until 7/31/21.   plan  --haldol reduced ot 5 mg tid 5/17  -- benztropine 1mg BID   -- zyprexa scheduled  -- prns available for agitation ( Zyprexa)  -- increased valproic acid to 750 mg daily  -- SW following for placement, placement will occur once guardianship is established  -- last psych visit on 5/17    Possible Tardive Dyskinesia vs EPSE  Per psych assessment on 4/1/21, noted to have possible tardive dyskinesia vs extrapyramidal side effects of meds. At that time, recommended to increase Cogentin to 1mg BID and add vitamin E 800 international unit(s) daily.      Back cellulitis in 1/2021: Resolved  Noted on 1/24/21, initiated on cephalexin at that time. Received local wound care and completed 7-day course of antibiotics on 1/30     Baseline Hypotension  BP 90-110s systolic on average. Asymptomatic. No concerns     Hyperlipidemia  Chronic and stable on llow dose ASA and  " atorvastatin     COPD  Not O2 dependent. Chronic and stable on salmeterol, albuterol prn     Hypothyroidism  Chronic and stable on levothyroxine     Tobacco use disorder  Using nicotine patch and PRN gum     Resting tremor of upper extremities  No acute issues      Hx of infective endocarditis with severe mitral and aortic regurgitation S/P bioprosthetic valve replacement (10/2015)  Systolic heart murmur  HFrEF, not in acute exacerbation: Follows with Dr. Dockery of Heart [facility] V[facility] Cardiology, last seen in 1/2020. Note heart murmur on exam, strongest in aortic region. No CP, SOB, dizziness, syncope.   * Echocardiogram in 5/2016 with EF 50%, no evidence for prosthetic mitral and aortic valve dysfunction.   - Follow-up with outpatient Cardiology upon dismissal from the hospital (on every 2 year follow-ups)   - Continue ASA      Hx of CVA: Note basal ganglia stroke in 2015. No focal neuro deficits aside from cognitive dysfunction as above.        Diet: Room Service  Diet  Combination Diet Regular Diet Adult; Safe Tray - with utensils  Snacks/Supplements Adult: Other; Bedtime snack; Between Meals  Room Service    DVT Prophylaxis: Pneumatic Compression Devices  Valentin Catheter: not present  Code Status: Full Code           Disposition Plan   Expected discharge: pending guardianship and placement  Entered: Yolanda Trinh MD 05/19/2021, 8:57 AM       The patient's care was discussed with the Patient.    Yolanda Trinh MD  Hospitalist Service  [facility]  Contact information available via [facility] Paging/Directory    ______________________________________________________________________    Interval History    \"I'm just stuck here in these four f----- walls.  There is nothing to do in here.\"  Patient is frustrated, that he needs to remain in hospital.  He offers no other complaints, no respiratory or GI issues noted    Data reviewed today: I reviewed all medications, new labs and imaging results " over the last 24 hours. I personally reviewed no images or EKG's today.    Physical Exam   Vital Signs:                    Weight: 185 lbs 9.6 oz  General Appearance: Well nourished male in NAD, pacing around in his room, appears comfortable  Respiratory: respirations non-labored   Cardiovascular: Exam deferred  GI: Exam deferred  Other:  alert, rambling speech, patient speaks in a low voice/mumbles oriented to self, moves all extremities equally and no facial asymmetry.     Data   Recent Labs   Lab 05/17/21  0805   WBC 5.4   HGB 15.9   MCV 92         POTASSIUM 4.0   CHLORIDE 112*   CO2 23   BUN 8   CR 0.68   ANIONGAP 5   LESLEE 8.3*   *

## 2021-05-19 NOTE — PLAN OF CARE
DATE & TIME: 5/19/2021 1467-6065   Cognitive Concerns/ Orientation : A&O to self only, Neuros intact.  BEHAVIOR & AGGRESSION TOOL COLOR: Yellow. Gets agitated easily but able to redirect back to his room this shift, no prn meds given.           ABNL VS/O2: VSS on RA  MOBILITY: Up independently, steady.  PAIN MANAGMENT: Denies  DIET: Regular  BOWEL/BLADDER: Continent  SKIN: Intact  D/C DATE: Discharge pending guardianship and placement. Civil commitment through [facility] until 7/31/21  Discharge Barriers: gaurdianship  OTHER IMPORTANT INFO: Continued Sitter 1:1. Agitated and restless at times, able to redirect this shift, no prn meds given.

## 2021-05-19 NOTE — PLAN OF CARE
DATE & TIME: 5/18/2021 1900-0730  Cognitive Concerns/ Orientation : A&O to self only, Neuros intact.  BEHAVIOR & AGGRESSION TOOL COLOR: Yellow, was agitated and wanting to leave at start of shift, emma Haldol given. Hx of behavior issues, agitation, elopement.             ABNL VS/O2: VSS on RA  MOBILITY: Up independently in room, steady.  PAIN MANAGMENT: Denies  DIET: Regular  BOWEL/BLADDER: Continent  SKIN: Intact  D/C DATE: Discharge pending guardianship and placement. Civil commitment through [facility] until 7/31/21  Discharge Barriers: gaurdianship  OTHER IMPORTANT INFO: Pt was restless, agitated, and kept asking for cigarette this evening, stated he was getting picked up tonight and is leaving regardless of what we say, schedule zyprexa given, explained to patient the situation and he was to stay with us the rest of the night, cooperative rest of the shift. Slept most of night.

## 2021-05-20 PROCEDURE — 99231 SBSQ HOSP IP/OBS SF/LOW 25: CPT | Performed by: INTERNAL MEDICINE

## 2021-05-20 PROCEDURE — 120N000001 HC R&B MED SURG/OB

## 2021-05-20 PROCEDURE — 250N000013 HC RX MED GY IP 250 OP 250 PS 637: Performed by: INTERNAL MEDICINE

## 2021-05-20 PROCEDURE — 250N000013 HC RX MED GY IP 250 OP 250 PS 637: Performed by: PSYCHIATRY & NEUROLOGY

## 2021-05-20 RX ADMIN — DOCUSATE SODIUM 100 MG: 100 CAPSULE, LIQUID FILLED ORAL at 08:20

## 2021-05-20 RX ADMIN — ASPIRIN 81 MG: 81 TABLET, COATED ORAL at 08:20

## 2021-05-20 RX ADMIN — Medication 800 UNITS: at 08:20

## 2021-05-20 RX ADMIN — OLANZAPINE 5 MG: 5 TABLET, ORALLY DISINTEGRATING ORAL at 08:20

## 2021-05-20 RX ADMIN — BENZTROPINE MESYLATE 1 MG: 0.5 TABLET ORAL at 21:20

## 2021-05-20 RX ADMIN — LEVOTHYROXINE SODIUM 88 MCG: 88 TABLET ORAL at 08:20

## 2021-05-20 RX ADMIN — ATORVASTATIN CALCIUM 80 MG: 40 TABLET, FILM COATED ORAL at 21:20

## 2021-05-20 RX ADMIN — SALMETEROL XINAFOATE 1 PUFF: 50 POWDER, METERED ORAL; RESPIRATORY (INHALATION) at 08:20

## 2021-05-20 RX ADMIN — HALOPERIDOL 5 MG: 5 TABLET ORAL at 08:20

## 2021-05-20 RX ADMIN — DIVALPROEX SODIUM 750 MG: 500 TABLET, FILM COATED, EXTENDED RELEASE ORAL at 21:21

## 2021-05-20 RX ADMIN — HALOPERIDOL 5 MG: 5 TABLET ORAL at 21:20

## 2021-05-20 RX ADMIN — HALOPERIDOL 5 MG: 5 TABLET ORAL at 15:13

## 2021-05-20 RX ADMIN — OLANZAPINE 5 MG: 5 TABLET, ORALLY DISINTEGRATING ORAL at 21:20

## 2021-05-20 RX ADMIN — SALMETEROL XINAFOATE 1 PUFF: 50 POWDER, METERED ORAL; RESPIRATORY (INHALATION) at 21:24

## 2021-05-20 RX ADMIN — MEMANTINE 5 MG: 5 TABLET ORAL at 08:20

## 2021-05-20 RX ADMIN — DOCUSATE SODIUM 100 MG: 100 CAPSULE, LIQUID FILLED ORAL at 21:20

## 2021-05-20 RX ADMIN — BENZTROPINE MESYLATE 1 MG: 0.5 TABLET ORAL at 08:20

## 2021-05-20 ASSESSMENT — ACTIVITIES OF DAILY LIVING (ADL)
ADLS_ACUITY_SCORE: 13

## 2021-05-20 NOTE — PROGRESS NOTES
[facility]    Medicine Progress Note - Hospitalist Service       Date of Admission:  9/9/2020  Assessment & Plan       John Juárez is a 55 year old male admitted on 9/9/2020. PMHx of schizophrenia, hx of TBI, alcohol use disorder, COPD, hyperlipidemia and hypothyroidism who was admitted on 9/9/2020 for evaluation of aggressive behavior at his group home.    Was evaluated by Psychiatry and his condition stabilized, though his group home was unwilling to take him back and thus hospitalization has been prolonged awaiting new placement and guardianship. Under civil commitment through [facility].     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  Brought to hospital for evaluation of aggressive behaviors. Group home now unwilling to take him back. Has had numerous CODE 21s called this stay. Seen by psych, meds adjusted. Is currently under civil commitment and court hold through [facility] until 7/31/21.   plan  --haldol reduced ot 5 mg tid 5/17  -- benztropine 1mg BID   -- zyprexa scheduled  -- prns available for agitation ( Zyprexa)  -- increased valproic acid to 750 mg daily  -- SW following for placement, placement will occur once guardianship is established  -- last psych visit on 5/17    Possible Tardive Dyskinesia vs EPSE  Per psych assessment on 4/1/21, noted to have possible tardive dyskinesia vs extrapyramidal side effects of meds. At that time, recommended to increase Cogentin to 1mg BID and add vitamin E 800 international unit(s) daily.      Back cellulitis in 1/2021: Resolved  Noted on 1/24/21, initiated on cephalexin at that time. Received local wound care and completed 7-day course of antibiotics on 1/30     Baseline Hypotension  BP 90-110s systolic on average. Asymptomatic. No concerns     Hyperlipidemia  Chronic and stable on llow dose ASA and  " atorvastatin     COPD  Not O2 dependent. Chronic and stable on salmeterol, albuterol prn     Hypothyroidism  Chronic and stable on levothyroxine     Tobacco use disorder  Using nicotine patch and PRN gum     Resting tremor of upper extremities  No acute issues      Hx of infective endocarditis with severe mitral and aortic regurgitation S/P bioprosthetic valve replacement (10/2015)  Systolic heart murmur  HFrEF, not in acute exacerbation: Follows with Dr. Dockery of [facility] V[facility] Cardiology, last seen in 1/2020. Note heart murmur on exam, strongest in aortic region. No CP, SOB, dizziness, syncope.   * Echocardiogram in 5/2016 with EF 50%, no evidence for prosthetic mitral and aortic valve dysfunction.   - Follow-up with outpatient Cardiology upon dismissal from the hospital (on every 2 year follow-ups)   - Continue ASA      Hx of CVA: Note basal ganglia stroke in 2015. No focal neuro deficits aside from cognitive dysfunction as above.        Diet: Room Service  Diet  Combination Diet Regular Diet Adult; Safe Tray - with utensils  Snacks/Supplements Adult: Other; Bedtime snack; Between Meals  Room Service    DVT Prophylaxis: Pneumatic Compression Devices  Valentin Catheter: not present  Code Status: Full Code           Disposition Plan   Expected discharge: pending guardianship and placement  Entered: Yolanda Trinh MD 05/20/2021, 8:54 AM       The patient's care was discussed with the Patient.    Yolanda Trinh MD  Hospitalist Service  [facility]  Contact information available via [facility] Paging/Directory    ______________________________________________________________________    Interval History    \"Playing cards.\" Patient is playing a game of general me with the attendant.  He is calm, offers no complaints.  There are no new respiratory or GI issues    Data reviewed today: I reviewed all medications, new labs and imaging results over the last 24 hours. I personally reviewed no images " or EKG's today.    Physical Exam   Vital Signs: Temp: (P) 97.5  F (36.4  C) Temp src: (P) Oral BP: (P) 123/84 Pulse: (P) 95   Resp: (P) 18 SpO2: (P) 100 % O2 Device: (P) None (Room air)    Weight: 185 lbs 9.6 oz  General Appearance: Well nourished male in NAD, sitting in chair, playing cards.  Looks comfortable  Respiratory: respirations non-labored   Cardiovascular: Exam deferred  GI: Exam deferred  Other:  alert, rambling speech, patient speaks in a low voice/mumbles oriented to self, moves all extremities equally and no facial asymmetry.     Data   Recent Labs   Lab 05/17/21  0805   WBC 5.4   HGB 15.9   MCV 92         POTASSIUM 4.0   CHLORIDE 112*   CO2 23   BUN 8   CR 0.68   ANIONGAP 5   LESLEE 8.3*   *

## 2021-05-20 NOTE — PLAN OF CARE
DATE & TIME: 5/19/2021 1900-0730   Cognitive Concerns/ Orientation : A&O to self only, Neuros intact.  BEHAVIOR & AGGRESSION TOOL COLOR: Yellow. Gets agitated easily but able to redirect back to his room this shift, PRN Zyprexa X1 given for agitation.          ABNL VS/O2: VSS on RA  MOBILITY: Up independently, steady.  PAIN MANAGMENT: Denies  DIET: Regular  BOWEL/BLADDER: Continent  SKIN: Intact  D/C DATE: Discharge pending guardianship and placement. Civil commitment through [facility] until 7/31/21  Discharge Barriers: guardianship  OTHER IMPORTANT INFO: Continued Sitter 1:1. Agitated and restless at times.

## 2021-05-20 NOTE — PLAN OF CARE
"DATE & TIME: 5/20/2021 9485-8735   Cognitive Concerns/ Orientation : A&O to self only, Neuros intact.  BEHAVIOR & AGGRESSION TOOL COLOR: Yellow. Gets agitated easily but able to redirect back to his room this shift, no prn meds given.           ABNL VS/O2: VSS on RA  MOBILITY: Up independently, steady.  PAIN MANAGMENT: Denies  DIET: Regular  BOWEL/BLADDER: Continent  SKIN: Intact  D/C DATE: Discharge pending guardianship and placement. Civil commitment through [facility] until 7/31/21  Discharge Barriers: gaurdianship  OTHER IMPORTANT INFO: Continued Sitter 1:1. Agitated and restless at times, able to redirect this shift, no prn meds given. Order in place for covid swab since 2/19, pt kept saying he had it done \"2 days ago\", and refused to be swabbed again, RN will continue to try.   "

## 2021-05-21 PROCEDURE — 250N000013 HC RX MED GY IP 250 OP 250 PS 637: Performed by: INTERNAL MEDICINE

## 2021-05-21 PROCEDURE — 250N000013 HC RX MED GY IP 250 OP 250 PS 637: Performed by: PSYCHIATRY & NEUROLOGY

## 2021-05-21 PROCEDURE — 250N000013 HC RX MED GY IP 250 OP 250 PS 637: Performed by: NURSE PRACTITIONER

## 2021-05-21 PROCEDURE — 120N000001 HC R&B MED SURG/OB

## 2021-05-21 PROCEDURE — 99231 SBSQ HOSP IP/OBS SF/LOW 25: CPT | Performed by: INTERNAL MEDICINE

## 2021-05-21 PROCEDURE — 250N000013 HC RX MED GY IP 250 OP 250 PS 637: Performed by: HOSPITALIST

## 2021-05-21 RX ORDER — BENZOCAINE/MENTHOL 6 MG-10 MG
LOZENGE MUCOUS MEMBRANE 2 TIMES DAILY
Status: DISPENSED | OUTPATIENT
Start: 2021-05-21 | End: 2021-06-04

## 2021-05-21 RX ADMIN — HALOPERIDOL 5 MG: 5 TABLET ORAL at 21:10

## 2021-05-21 RX ADMIN — DOCUSATE SODIUM 100 MG: 100 CAPSULE, LIQUID FILLED ORAL at 21:09

## 2021-05-21 RX ADMIN — LEVOTHYROXINE SODIUM 88 MCG: 88 TABLET ORAL at 09:24

## 2021-05-21 RX ADMIN — OLANZAPINE 5 MG: 5 TABLET, ORALLY DISINTEGRATING ORAL at 16:16

## 2021-05-21 RX ADMIN — SALMETEROL XINAFOATE 1 PUFF: 50 POWDER, METERED ORAL; RESPIRATORY (INHALATION) at 09:25

## 2021-05-21 RX ADMIN — POLYETHYLENE GLYCOL 400 AND PROPYLENE GLYCOL 1 DROP: 4; 3 SOLUTION/ DROPS OPHTHALMIC at 15:07

## 2021-05-21 RX ADMIN — ATORVASTATIN CALCIUM 80 MG: 40 TABLET, FILM COATED ORAL at 21:10

## 2021-05-21 RX ADMIN — OLANZAPINE 5 MG: 5 TABLET, ORALLY DISINTEGRATING ORAL at 17:42

## 2021-05-21 RX ADMIN — POLYETHYLENE GLYCOL 400 AND PROPYLENE GLYCOL 1 DROP: 4; 3 SOLUTION/ DROPS OPHTHALMIC at 21:17

## 2021-05-21 RX ADMIN — MEMANTINE 5 MG: 5 TABLET ORAL at 09:25

## 2021-05-21 RX ADMIN — HALOPERIDOL 5 MG: 5 TABLET ORAL at 13:50

## 2021-05-21 RX ADMIN — HYDROCORTISONE: 10 CREAM TOPICAL at 13:50

## 2021-05-21 RX ADMIN — OLANZAPINE 5 MG: 5 TABLET, ORALLY DISINTEGRATING ORAL at 21:10

## 2021-05-21 RX ADMIN — HYDROCORTISONE: 10 CREAM TOPICAL at 21:15

## 2021-05-21 RX ADMIN — DOCUSATE SODIUM 100 MG: 100 CAPSULE, LIQUID FILLED ORAL at 09:24

## 2021-05-21 RX ADMIN — BENZTROPINE MESYLATE 1 MG: 0.5 TABLET ORAL at 09:24

## 2021-05-21 RX ADMIN — SALMETEROL XINAFOATE 1 PUFF: 50 POWDER, METERED ORAL; RESPIRATORY (INHALATION) at 21:13

## 2021-05-21 RX ADMIN — POLYETHYLENE GLYCOL 400 AND PROPYLENE GLYCOL 1 DROP: 4; 3 SOLUTION/ DROPS OPHTHALMIC at 13:52

## 2021-05-21 RX ADMIN — ASPIRIN 81 MG: 81 TABLET, COATED ORAL at 09:24

## 2021-05-21 RX ADMIN — OLANZAPINE 5 MG: 5 TABLET, ORALLY DISINTEGRATING ORAL at 22:13

## 2021-05-21 RX ADMIN — BENZTROPINE MESYLATE 1 MG: 0.5 TABLET ORAL at 21:10

## 2021-05-21 RX ADMIN — DIVALPROEX SODIUM 750 MG: 500 TABLET, FILM COATED, EXTENDED RELEASE ORAL at 21:09

## 2021-05-21 RX ADMIN — POLYETHYLENE GLYCOL 400 AND PROPYLENE GLYCOL 1 DROP: 4; 3 SOLUTION/ DROPS OPHTHALMIC at 04:43

## 2021-05-21 RX ADMIN — Medication 800 UNITS: at 09:24

## 2021-05-21 RX ADMIN — ACETAMINOPHEN 650 MG: 325 TABLET, FILM COATED ORAL at 19:38

## 2021-05-21 RX ADMIN — POLYETHYLENE GLYCOL 400 AND PROPYLENE GLYCOL 1 DROP: 4; 3 SOLUTION/ DROPS OPHTHALMIC at 16:17

## 2021-05-21 RX ADMIN — HALOPERIDOL 5 MG: 5 TABLET ORAL at 09:25

## 2021-05-21 RX ADMIN — OLANZAPINE 5 MG: 5 TABLET, ORALLY DISINTEGRATING ORAL at 09:24

## 2021-05-21 ASSESSMENT — ACTIVITIES OF DAILY LIVING (ADL)
ADLS_ACUITY_SCORE: 13

## 2021-05-21 NOTE — PLAN OF CARE
DATE & TIME: 5/20/2021 1089-0155  Cognitive Concerns/ Orientation : A&O to self only, Neuros intact.  BEHAVIOR & AGGRESSION TOOL COLOR: Yellow. Gets agitated easily but able to redirect back to his room this shift, no prn meds given.           ABNL VS/O2: VSS on RA  MOBILITY: Up independently, steady.  PAIN MANAGMENT: Denies  DIET: Regular  BOWEL/BLADDER: Continent  SKIN: Intact  D/C DATE: Discharge pending guardianship and placement. Civil commitment through [facility] until 7/31/21  Discharge Barriers: gaurdianship  OTHER IMPORTANT INFO: Continued Sitter 1:1. Agitated and restless at times, able to redirect this shift, no prn meds given.

## 2021-05-21 NOTE — PLAN OF CARE
"DATE & TIME: 5/21/2021 4356-2443  Cognitive Concerns/ Orientation : A&O to self only, Neuros intact.  BEHAVIOR & AGGRESSION TOOL COLOR: Yellow. Gets agitated easily but able to redirect back to his room this shift, no prn meds given.           ABNL VS/O2: VSS on RA  MOBILITY: Up independently, steady.  PAIN MANAGMENT: Denies  DIET: Regular  BOWEL/BLADDER: Continent  SKIN: redness behind Lt ear, c/o itchiness, MD ordered Hydrocortisone 1% BID.    D/C DATE: Discharge pending guardianship and placement. Civil commitment through [facility] until 7/31/21  Discharge Barriers: gaurdianship  OTHER IMPORTANT INFO: Continued Sitter 1:1. Agitated and restless at times, able to redirect this shift, no prn meds given. Order has been in place for covid swab since 2/19, pt kept saying he had it done \"2 days ago\", and refused to be swabbed again, order discontinued after discussing with charge RN. MD okayed to discontinue neuro checks.   "

## 2021-05-21 NOTE — PROGRESS NOTES
[facility]    Medicine Progress Note - Hospitalist Service       Date of Admission:  9/9/2020  Assessment & Plan       John Juárez is a 55 year old male admitted on 9/9/2020. PMHx of schizophrenia, hx of TBI, alcohol use disorder, COPD, hyperlipidemia and hypothyroidism who was admitted on 9/9/2020 for evaluation of aggressive behavior at his group home.    Was evaluated by Psychiatry and his condition stabilized, though his group home was unwilling to take him back and thus hospitalization has been prolonged awaiting new placement and guardianship. Under civil commitment through [facility].     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  Brought to hospital for evaluation of aggressive behaviors. Group home now unwilling to take him back. Has had numerous CODE 21s called this stay. Seen by psych, meds adjusted. Is currently under civil commitment and court hold through [facility] until 7/31/21.   plan  --haldol reduced ot 5 mg tid 5/17  -- benztropine 1mg BID   -- zyprexa scheduled  -- prns available for agitation ( Zyprexa)  -- increased valproic acid to 750 mg daily  -- SW following for placement, placement will occur once guardianship is established  -- last psych visit on 5/17  -- Discontinue every 4 hours neuro checks    Possible Tardive Dyskinesia vs EPSE  Per psych assessment on 4/1/21, noted to have possible tardive dyskinesia vs extrapyramidal side effects of meds. At that time, recommended to increase Cogentin to 1mg BID and add vitamin E 800 international unit(s) daily.      Back cellulitis in 1/2021: Resolved  Rash  Noted on 1/24/21, initiated on cephalexin at that time. Received local wound care and completed 7-day course of antibiotics on 1/30  Patient complains of itchy area behind his left ear; use topical hydrocortisone cream twice  daily     Baseline Hypotension  BP 90-110s systolic on average. Asymptomatic. No concerns     Hyperlipidemia  Chronic and stable on llow dose ASA and  atorvastatin     COPD  Not O2 dependent. Chronic and stable on salmeterol, albuterol prn     Hypothyroidism  Chronic and stable on levothyroxine     Tobacco use disorder  Using nicotine patch and PRN gum     Resting tremor of upper extremities  No acute issues      Hx of infective endocarditis with severe mitral and aortic regurgitation S/P bioprosthetic valve replacement (10/2015)  Systolic heart murmur  HFrEF, not in acute exacerbation: Follows with Dr. Dockery of [facility] V[facility], last seen in 1/2020. Note heart murmur on exam, strongest in aortic region. No CP, SOB, dizziness, syncope.   * Echocardiogram in 5/2016 with EF 50%, no evidence for prosthetic mitral and aortic valve dysfunction.   - Follow-up with outpatient Cardiology upon dismissal from the hospital (on every 2 year follow-ups)   - Continue ASA      Hx of CVA: Note basal ganglia stroke in 2015. No focal neuro deficits aside from cognitive dysfunction as above.     Non-severe malnutrition  Malnutrition:    - Level of malnutrition: Non-Severe   - Based on: reduced intake, moderate/severe subcutaneous fat loss, moderate/severe muscle loss       Diet: Room Service  Diet  Combination Diet Regular Diet Adult; Safe Tray - with utensils  Snacks/Supplements Adult: Other; Bedtime snack; Between Meals  Room Service    DVT Prophylaxis: Pneumatic Compression Devices  Valentin Catheter: not present  Code Status: Full Code           Disposition Plan   Expected discharge: pending guardianship and placement  Entered: Yolanda Trinh MD 05/21/2021, 8:36 AM       The patient's care was discussed with the Patient.    Yolanda Trinh MD  Hospitalist Service  [facility]  Contact information available via [facility]  "Paging/Directory    ______________________________________________________________________    Interval History    \"I was trying to take a nap.\"  Patient is in bed, he mumbles and most remarks are difficult to understand.  He complained of an itchy area behind his left ear, has been scratching at it.  He has no new respiratory or GI complaints.    Data reviewed today: I reviewed all medications, new labs and imaging results over the last 24 hours. I personally reviewed no images or EKG's today.    Physical Exam   Vital Signs: Temp: 97.2  F (36.2  C) Temp src: Oral BP: 102/62 Pulse: 89   Resp: 18 SpO2: 100 % O2 Device: None (Room air)    Weight: 185 lbs 9.6 oz  General Appearance: Well nourished male in NAD, sitting in chair, playing cards.  Looks comfortable  Respiratory: respirations non-labored   Cardiovascular: Exam deferred  GI: Exam deferred  Other:  alert, rambling speech, patient speaks in a low voice/mumbles oriented to self, moves all extremities equally and no facial asymmetry.     Data   Recent Labs   Lab 05/17/21  0805   WBC 5.4   HGB 15.9   MCV 92         POTASSIUM 4.0   CHLORIDE 112*   CO2 23   BUN 8   CR 0.68   ANIONGAP 5   LESLEE 8.3*   *       "

## 2021-05-21 NOTE — PLAN OF CARE
DATE & TIME: 5/21/2021 6140-0975  Cognitive Concerns/ Orientation : A&O to self only, Neuros intact.  BEHAVIOR & AGGRESSION TOOL COLOR: Yellow. Gets agitated easily but able to redirect back to his room this shift, no prn meds given.           ABNL VS/O2: VSS on RA  MOBILITY: Up independently, in room steady. SBA in halls, ambulating in halls frequently.  PAIN MANAGMENT: Denies  DIET: Regular  BOWEL/BLADDER: Continent  SKIN: redness behind Lt ear, c/o itchiness, MD ordered Hydrocortisone 1% BID.    D/C DATE: Discharge pending guardianship and placement. Civil commitment through [facility] until 7/31/21  Discharge Barriers: gaurdianship  OTHER IMPORTANT INFO: Continued Sitter 1:1. Agitated and restless at times, frequently asking to leave. PO zyprexa given. COVID swab ordered, patient refused.

## 2021-05-22 PROCEDURE — 250N000013 HC RX MED GY IP 250 OP 250 PS 637: Performed by: NURSE PRACTITIONER

## 2021-05-22 PROCEDURE — 250N000013 HC RX MED GY IP 250 OP 250 PS 637: Performed by: INTERNAL MEDICINE

## 2021-05-22 PROCEDURE — 250N000013 HC RX MED GY IP 250 OP 250 PS 637: Performed by: PSYCHIATRY & NEUROLOGY

## 2021-05-22 PROCEDURE — 120N000001 HC R&B MED SURG/OB

## 2021-05-22 PROCEDURE — 99231 SBSQ HOSP IP/OBS SF/LOW 25: CPT | Performed by: INTERNAL MEDICINE

## 2021-05-22 PROCEDURE — 250N000011 HC RX IP 250 OP 636: Performed by: INTERNAL MEDICINE

## 2021-05-22 RX ORDER — HALOPERIDOL 5 MG/ML
10 INJECTION INTRAMUSCULAR EVERY 6 HOURS PRN
Status: DISCONTINUED | OUTPATIENT
Start: 2021-05-22 | End: 2021-06-30 | Stop reason: HOSPADM

## 2021-05-22 RX ADMIN — MEMANTINE 5 MG: 5 TABLET ORAL at 09:39

## 2021-05-22 RX ADMIN — HALOPERIDOL 5 MG: 5 TABLET ORAL at 20:47

## 2021-05-22 RX ADMIN — Medication 800 UNITS: at 09:39

## 2021-05-22 RX ADMIN — SALMETEROL XINAFOATE 1 PUFF: 50 POWDER, METERED ORAL; RESPIRATORY (INHALATION) at 22:01

## 2021-05-22 RX ADMIN — DOCUSATE SODIUM 100 MG: 100 CAPSULE, LIQUID FILLED ORAL at 09:39

## 2021-05-22 RX ADMIN — BENZTROPINE MESYLATE 1 MG: 0.5 TABLET ORAL at 09:40

## 2021-05-22 RX ADMIN — OLANZAPINE 10 MG: 5 TABLET, ORALLY DISINTEGRATING ORAL at 14:57

## 2021-05-22 RX ADMIN — BENZTROPINE MESYLATE 1 MG: 0.5 TABLET ORAL at 20:47

## 2021-05-22 RX ADMIN — DOCUSATE SODIUM 100 MG: 100 CAPSULE, LIQUID FILLED ORAL at 20:48

## 2021-05-22 RX ADMIN — SALMETEROL XINAFOATE 1 PUFF: 50 POWDER, METERED ORAL; RESPIRATORY (INHALATION) at 13:02

## 2021-05-22 RX ADMIN — HYDROCORTISONE: 10 CREAM TOPICAL at 22:01

## 2021-05-22 RX ADMIN — HALOPERIDOL LACTATE 10 MG: 5 INJECTION, SOLUTION INTRAMUSCULAR at 15:58

## 2021-05-22 RX ADMIN — LEVOTHYROXINE SODIUM 88 MCG: 88 TABLET ORAL at 09:40

## 2021-05-22 RX ADMIN — HALOPERIDOL 5 MG: 5 TABLET ORAL at 13:02

## 2021-05-22 RX ADMIN — HALOPERIDOL 5 MG: 5 TABLET ORAL at 09:39

## 2021-05-22 RX ADMIN — ATORVASTATIN CALCIUM 80 MG: 40 TABLET, FILM COATED ORAL at 20:46

## 2021-05-22 RX ADMIN — OLANZAPINE 10 MG: 5 TABLET, ORALLY DISINTEGRATING ORAL at 11:11

## 2021-05-22 RX ADMIN — OLANZAPINE 5 MG: 5 TABLET, ORALLY DISINTEGRATING ORAL at 22:56

## 2021-05-22 RX ADMIN — DIVALPROEX SODIUM 750 MG: 500 TABLET, FILM COATED, EXTENDED RELEASE ORAL at 21:25

## 2021-05-22 RX ADMIN — OLANZAPINE 5 MG: 5 TABLET, ORALLY DISINTEGRATING ORAL at 09:39

## 2021-05-22 RX ADMIN — OLANZAPINE 5 MG: 5 TABLET, ORALLY DISINTEGRATING ORAL at 20:46

## 2021-05-22 RX ADMIN — ASPIRIN 81 MG: 81 TABLET, COATED ORAL at 09:39

## 2021-05-22 ASSESSMENT — ACTIVITIES OF DAILY LIVING (ADL)
ADLS_ACUITY_SCORE: 13

## 2021-05-22 NOTE — PLAN OF CARE
DATE & TIME: 5/21-5/22/2021 6592-9500  Cognitive Concerns/ Orientation : A&O to self only  BEHAVIOR & AGGRESSION TOOL COLOR: Yellow. Gets agitated easily but able to redirect back to his room this shift, prn Zyprexa x1 given  ABNL VS/O2: VSS on RA  MOBILITY: Up independently, in room steady. SBA in halls, ambulating in halls frequently.  PAIN MANAGMENT: Denies  DIET: Regular  BOWEL/BLADDER: Continent  SKIN: redness behind Lt ear, c/o itchiness, Hydrocortisone 1% applied BID.    Drain/Devices: PIV SL.  D/C DATE: Discharge pending guardianship and placement. Civil commitment through [facility] until 7/31/21  Discharge Barriers: gaurdianship  OTHER IMPORTANT INFO: 1:1 sitter not available. Agitated and restless at times, frequently asking to leave. COVID swab ordered, patient refused again and became agitated.

## 2021-05-22 NOTE — PLAN OF CARE
DATE & TIME: 5/22/2021 1  Cognitive Concerns/ Orientation : A&O to self only; bit more agitated today  BEHAVIOR & AGGRESSION TOOL COLOR: Yellow. Gets agitated easily but able to redirect back to his room this shift, prn Zyprexa x1 given  ABNL VS/O2: VSS on RA  MOBILITY: Up independently, in room steady. SBA in halls, ambulating in halls frequently.  PAIN MANAGMENT: Denies  DIET: Regular  BOWEL/BLADDER: Continent  SKIN: redness behind Lt ear much improved, c/o itchiness,   Drain/Devices: PIV SL.  D/C DATE: Discharge pending guardianship and placement. Civil commitment through [facility] until 7/31/21  Discharge Barriers: Guardianship  OTHER IMPORTANT INFO:  Agitated and restless at times, frequently asking to leave. COVID swab ordered, patient refused and became agitated.

## 2021-05-22 NOTE — PROGRESS NOTES
[facility]    Medicine Progress Note - Hospitalist Service       Date of Admission:  9/9/2020  Assessment & Plan       John Juárez is a 55 year old male admitted on 9/9/2020. PMHx of schizophrenia, hx of TBI, alcohol use disorder, COPD, hyperlipidemia and hypothyroidism who was admitted on 9/9/2020 for evaluation of aggressive behavior at his group home.    Was evaluated by Psychiatry and his condition stabilized, though his group home was unwilling to take him back and thus hospitalization has been prolonged awaiting new placement and guardianship. Under civil commitment through [facility].     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  Brought to hospital for evaluation of aggressive behaviors. Group home now unwilling to take him back. Has had numerous CODE 21s called this stay. Seen by psych, meds adjusted. Is currently under civil commitment and court hold through [facility] until 7/31/21.   plan  --haldol reduced ot 5 mg tid 5/17  -- benztropine 1mg BID   -- zyprexa scheduled  -- prns available for agitation ( Zyprexa)  -- increased valproic acid to 750 mg daily  -- SW following for placement, placement will occur once guardianship is established  -- last psych visit on 5/17  -- Discontinue every 4 hours neuro checks    Possible Tardive Dyskinesia vs EPSE  Per psych assessment on 4/1/21, noted to have possible tardive dyskinesia vs extrapyramidal side effects of meds. At that time, recommended to increase Cogentin to 1mg BID and add vitamin E 800 international unit(s) daily.      Back cellulitis in 1/2021: Resolved  Rash  Noted on 1/24/21, initiated on cephalexin at that time. Received local wound care and completed 7-day course of antibiotics on 1/30  Patient complains of itchy area behind his left ear; use topical hydrocortisone cream twice  daily     Baseline Hypotension  BP 90-110s systolic on average. Asymptomatic. No concerns     Hyperlipidemia  Chronic and stable on llow dose ASA and  atorvastatin     COPD  Not O2 dependent. Chronic and stable on salmeterol, albuterol prn     Hypothyroidism  Chronic and stable on levothyroxine     Tobacco use disorder  Using nicotine patch and PRN gum     Resting tremor of upper extremities  No acute issues      Hx of infective endocarditis with severe mitral and aortic regurgitation S/P bioprosthetic valve replacement (10/2015)  Systolic heart murmur  HFrEF, not in acute exacerbation: Follows with Dr. Dockery of Heart and Vascular Cardiology, last seen in 1/2020. Note heart murmur on exam, strongest in aortic region. No CP, SOB, dizziness, syncope.   * Echocardiogram in 5/2016 with EF 50%, no evidence for prosthetic mitral and aortic valve dysfunction.   - Follow-up with outpatient Cardiology upon dismissal from the hospital (on every 2 year follow-ups)   - Continue ASA      Hx of CVA: Note basal ganglia stroke in 2015. No focal neuro deficits aside from cognitive dysfunction as above.     Non-severe malnutrition  Malnutrition:    - Level of malnutrition: Non-Severe   - Based on: reduced intake, moderate/severe subcutaneous fat loss, moderate/severe muscle loss       Diet: Room Service  Diet  Combination Diet Regular Diet Adult; Safe Tray - with utensils  Snacks/Supplements Adult: Other; Bedtime snack; Between Meals  Room Service    DVT Prophylaxis: Pneumatic Compression Devices  Valentin Catheter: not present  Code Status: Full Code           Disposition Plan   Expected discharge: pending guardianship and placement  Entered: Yolanda Trinh MD 05/22/2021, 8:55 AM       The patient's care was discussed with the Patient and bedside RN    Yolanda Trinh MD  Hospitalist Service  [facility]  Contact information available via [facility]  "Paging/Directory    ______________________________________________________________________    Interval History    \" I played baseball.\"  Patient is watching sports on TV, we discussed sports.  He sometimes mumbles and remarks are difficult to understand.  Indicates that his skin behind the ear does not itch as much.    Data reviewed today: I reviewed all medications, new labs and imaging results over the last 24 hours. I personally reviewed no images or EKG's today.    Physical Exam   Vital Signs:                    Weight: 185 lbs 9.6 oz  General Appearance: Well nourished male in NAD, laying in bed watching TV  Respiratory: respirations non-labored   Cardiovascular: Exam deferred  GI: Exam deferred  Other:  alert, rambling speech, patient speaks in a low voice/mumbles oriented to self, moves all extremities equally and no facial asymmetry.     Data   Recent Labs   Lab 05/17/21  0805   WBC 5.4   HGB 15.9   MCV 92         POTASSIUM 4.0   CHLORIDE 112*   CO2 23   BUN 8   CR 0.68   ANIONGAP 5   LESLEE 8.3*   *       "

## 2021-05-23 PROCEDURE — 120N000001 HC R&B MED SURG/OB

## 2021-05-23 PROCEDURE — 99231 SBSQ HOSP IP/OBS SF/LOW 25: CPT | Performed by: INTERNAL MEDICINE

## 2021-05-23 PROCEDURE — 250N000011 HC RX IP 250 OP 636: Performed by: INTERNAL MEDICINE

## 2021-05-23 PROCEDURE — 250N000013 HC RX MED GY IP 250 OP 250 PS 637: Performed by: PSYCHIATRY & NEUROLOGY

## 2021-05-23 PROCEDURE — 250N000013 HC RX MED GY IP 250 OP 250 PS 637: Performed by: INTERNAL MEDICINE

## 2021-05-23 RX ORDER — HALOPERIDOL 5 MG/1
10 TABLET ORAL 3 TIMES DAILY
Status: DISCONTINUED | OUTPATIENT
Start: 2021-05-23 | End: 2021-06-30 | Stop reason: HOSPADM

## 2021-05-23 RX ADMIN — ASPIRIN 81 MG: 81 TABLET, COATED ORAL at 08:17

## 2021-05-23 RX ADMIN — SALMETEROL XINAFOATE 1 PUFF: 50 POWDER, METERED ORAL; RESPIRATORY (INHALATION) at 21:55

## 2021-05-23 RX ADMIN — DOCUSATE SODIUM 100 MG: 100 CAPSULE, LIQUID FILLED ORAL at 08:17

## 2021-05-23 RX ADMIN — HALOPERIDOL 10 MG: 5 TABLET ORAL at 21:51

## 2021-05-23 RX ADMIN — MEMANTINE 5 MG: 5 TABLET ORAL at 08:17

## 2021-05-23 RX ADMIN — ATORVASTATIN CALCIUM 80 MG: 40 TABLET, FILM COATED ORAL at 21:52

## 2021-05-23 RX ADMIN — HALOPERIDOL LACTATE 10 MG: 5 INJECTION, SOLUTION INTRAMUSCULAR at 17:02

## 2021-05-23 RX ADMIN — DOCUSATE SODIUM 100 MG: 100 CAPSULE, LIQUID FILLED ORAL at 21:52

## 2021-05-23 RX ADMIN — SALMETEROL XINAFOATE 1 PUFF: 50 POWDER, METERED ORAL; RESPIRATORY (INHALATION) at 08:17

## 2021-05-23 RX ADMIN — HALOPERIDOL 10 MG: 5 TABLET ORAL at 13:05

## 2021-05-23 RX ADMIN — HALOPERIDOL 5 MG: 5 TABLET ORAL at 08:17

## 2021-05-23 RX ADMIN — BENZTROPINE MESYLATE 1 MG: 0.5 TABLET ORAL at 21:51

## 2021-05-23 RX ADMIN — OLANZAPINE 5 MG: 5 TABLET, ORALLY DISINTEGRATING ORAL at 21:52

## 2021-05-23 RX ADMIN — LEVOTHYROXINE SODIUM 88 MCG: 88 TABLET ORAL at 08:17

## 2021-05-23 RX ADMIN — BENZTROPINE MESYLATE 1 MG: 0.5 TABLET ORAL at 08:17

## 2021-05-23 RX ADMIN — OLANZAPINE 5 MG: 5 TABLET, ORALLY DISINTEGRATING ORAL at 08:17

## 2021-05-23 RX ADMIN — DIVALPROEX SODIUM 750 MG: 500 TABLET, FILM COATED, EXTENDED RELEASE ORAL at 21:52

## 2021-05-23 RX ADMIN — Medication 800 UNITS: at 08:17

## 2021-05-23 ASSESSMENT — ACTIVITIES OF DAILY LIVING (ADL)
ADLS_ACUITY_SCORE: 13

## 2021-05-23 NOTE — PLAN OF CARE
DATE & TIME: 5/23/21 Day shift  Cognitive Concerns/ Orientation : A&O to self only; slammed door a few times due to wanting to smoke and leaving; but redirectable  BEHAVIOR & AGGRESSION TOOL COLOR: Yellow. Gets agitated easily but able to redirect back to his room this shift  ABNL VS/O2: VSS on RA  MOBILITY: Up independently, in room steady. SBA in halls, ambulating in halls frequently.  PAIN MANAGMENT: Denies  DIET: Regular  BOWEL/BLADDER: Continent  SKIN: redness behind Lt ear much improved, c/o itchiness, scalp also dry.  Drain/Devices: PIV SL.  D/C DATE: Discharge pending guardianship and placement. Civil commitment through [facility] until 7/31/21  Discharge Barriers: Guardianship  OTHER IMPORTANT INFO:  Agitated and restless at times, frequently asking to leave. COVID swab ordered, patient refused and became agitated. Haldol increased to 10 mg TIB.

## 2021-05-23 NOTE — PLAN OF CARE
Pt is A&O to self only; pt agitated more than normal; gave PRN 5 mg Zyprexa x1 given. VSS on RA. Up independently in room; steady on feet; SBA in halls. Tolerating regular diet. Denies pain. Continent B/B. PIV; SL. Discharge pending guardianship and placement; civil commitment through [facility] until 7/31/21.

## 2021-05-23 NOTE — PROGRESS NOTES
[facility]    Medicine Progress Note - Hospitalist Service       Date of Admission:  9/9/2020  Assessment & Plan       John Juárez is a 55 year old male admitted on 9/9/2020. PMHx of schizophrenia, hx of TBI, alcohol use disorder, COPD, hyperlipidemia and hypothyroidism who was admitted on 9/9/2020 for evaluation of aggressive behavior at his group home.    Was evaluated by Psychiatry and his condition stabilized, though his group home was unwilling to take him back and thus hospitalization has been prolonged awaiting new placement and guardianship. Under civil commitment through [facility].     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  Brought to hospital for evaluation of aggressive behaviors. Group home now unwilling to take him back. Has had numerous CODE 21s called this stay. Seen by psych, meds adjusted. Is currently under civil commitment and court hold through [facility] until 7/31/21.   plan  --haldol reduced ot 5 mg tid 5/17, increase to 10 mg TID again today, 5/23, because of increasing agitation  -- benztropine 1mg BID   -- zyprexa scheduled  -- prns available for agitation ( Zyprexa)  -- increased valproic acid to 750 mg daily  -- SW following for placement, placement will occur once guardianship is established  -- last psych visit on 5/17; Psychiatry followup requested since patient's agitation is increasing  -- Discontinued every 4 hours neuro checks    Possible Tardive Dyskinesia vs EPSE  Per psych assessment on 4/1/21, noted to have possible tardive dyskinesia vs extrapyramidal side effects of meds. At that time, recommended to increase Cogentin to 1mg BID and add vitamin E 800 international unit(s) daily.      Back cellulitis in 1/2021: Resolved  Rash  Noted on 1/24/21, initiated on cephalexin at that time. Received local wound care and completed  7-day course of antibiotics on 1/30  Patient complains of itchy area behind his left ear; use topical hydrocortisone cream twice daily     Baseline Hypotension  BP 90-110s systolic on average. Asymptomatic. No concerns     Hyperlipidemia  Chronic and stable on llow dose ASA and  atorvastatin     COPD  Not O2 dependent. Chronic and stable on salmeterol, albuterol prn     Hypothyroidism  Chronic and stable on levothyroxine     Tobacco use disorder  Using nicotine patch and PRN gum     Resting tremor of upper extremities  No acute issues      Hx of infective endocarditis with severe mitral and aortic regurgitation S/P bioprosthetic valve replacement (10/2015)  Systolic heart murmur  HFrEF, not in acute exacerbation: Follows with Dr. Dockery of Heart and V[facility] Cardiology, last seen in 1/2020. Note heart murmur on exam, strongest in aortic region. No CP, SOB, dizziness, syncope.   * Echocardiogram in 5/2016 with EF 50%, no evidence for prosthetic mitral and aortic valve dysfunction.   - Follow-up with outpatient Cardiology upon dismissal from the hospital (on every 2 year follow-ups)   - Continue ASA      Hx of CVA: Note basal ganglia stroke in 2015. No focal neuro deficits aside from cognitive dysfunction as above.     Non-severe malnutrition  Malnutrition:    - Level of malnutrition: Non-Severe   - Based on: reduced intake, moderate/severe subcutaneous fat loss, moderate/severe muscle loss       Diet: Room Service  Diet  Combination Diet Regular Diet Adult; Safe Tray - with utensils  Snacks/Supplements Adult: Other; Bedtime snack; Between Meals  Room Service    DVT Prophylaxis: Pneumatic Compression Devices  Valentin Catheter: not present  Code Status: Full Code           Disposition Plan   Expected discharge: pending guardianship and placement  Entered: Yolanda Trinh MD 05/23/2021, 8:33 AM     The patient's care was discussed with the Patient and bedside RN    Yolanda Trinh MD  Hospitalist Service  [facility]  "[facility]  Contact information available via [facility] Paging/Directory    ______________________________________________________________________    Interval History    \" I need to get out of this f--- place.  I am going to amanda this hospital and everyone who has been involved in my care because they will not let me leave.\"  Patient is upset about remaining in hospital.  He has no new respiratory or GI complaints.    Data reviewed today: I reviewed all medications, new labs and imaging results over the last 24 hours. I personally reviewed no images or EKG's today.    Physical Exam   Vital Signs: Temp: 97.4  F (36.3  C) Temp src: Oral BP: 116/84 Pulse: 101   Resp: 16 SpO2: 97 % O2 Device: None (Room air)    Weight: 185 lbs 9.6 oz  General Appearance: Well nourished male in NAD, laying in bed watching TV  Respiratory: respirations non-labored   Cardiovascular: RRR  GI: abdomen is nondistended  Other:  alert, rambling speech, patient speaks in a low voice/mumbles oriented to self, moves all extremities equally and no facial asymmetry.     Data   Recent Labs   Lab 05/17/21  0805   WBC 5.4   HGB 15.9   MCV 92         POTASSIUM 4.0   CHLORIDE 112*   CO2 23   BUN 8   CR 0.68   ANIONGAP 5   LESLEE 8.3*   *       "

## 2021-05-24 PROCEDURE — 250N000011 HC RX IP 250 OP 636: Performed by: NURSE PRACTITIONER

## 2021-05-24 PROCEDURE — 250N000013 HC RX MED GY IP 250 OP 250 PS 637: Performed by: INTERNAL MEDICINE

## 2021-05-24 PROCEDURE — 120N000001 HC R&B MED SURG/OB

## 2021-05-24 PROCEDURE — 99207 PR NO CHARGE LOS: CPT | Performed by: NURSE PRACTITIONER

## 2021-05-24 PROCEDURE — 250N000013 HC RX MED GY IP 250 OP 250 PS 637: Performed by: NURSE PRACTITIONER

## 2021-05-24 PROCEDURE — 250N000011 HC RX IP 250 OP 636

## 2021-05-24 PROCEDURE — 99232 SBSQ HOSP IP/OBS MODERATE 35: CPT | Performed by: PSYCHIATRY & NEUROLOGY

## 2021-05-24 PROCEDURE — 250N000009 HC RX 250

## 2021-05-24 PROCEDURE — 250N000013 HC RX MED GY IP 250 OP 250 PS 637: Performed by: PSYCHIATRY & NEUROLOGY

## 2021-05-24 PROCEDURE — 99232 SBSQ HOSP IP/OBS MODERATE 35: CPT | Performed by: INTERNAL MEDICINE

## 2021-05-24 RX ORDER — OLANZAPINE 10 MG/2ML
10 INJECTION, POWDER, FOR SOLUTION INTRAMUSCULAR DAILY PRN
Status: DISCONTINUED | OUTPATIENT
Start: 2021-05-24 | End: 2021-05-26

## 2021-05-24 RX ORDER — OLANZAPINE 10 MG/2ML
INJECTION, POWDER, FOR SOLUTION INTRAMUSCULAR
Status: DISPENSED
Start: 2021-05-24 | End: 2021-05-25

## 2021-05-24 RX ORDER — OLANZAPINE 5 MG/1
10 TABLET, ORALLY DISINTEGRATING ORAL 2 TIMES DAILY
Status: DISCONTINUED | OUTPATIENT
Start: 2021-05-24 | End: 2021-05-26

## 2021-05-24 RX ORDER — DIVALPROEX SODIUM 500 MG/1
1000 TABLET, EXTENDED RELEASE ORAL AT BEDTIME
Status: DISCONTINUED | OUTPATIENT
Start: 2021-05-24 | End: 2021-06-02

## 2021-05-24 RX ORDER — WATER 10 ML/10ML
INJECTION INTRAMUSCULAR; INTRAVENOUS; SUBCUTANEOUS
Status: COMPLETED
Start: 2021-05-24 | End: 2021-05-24

## 2021-05-24 RX ORDER — OLANZAPINE 10 MG/2ML
INJECTION, POWDER, FOR SOLUTION INTRAMUSCULAR
Status: COMPLETED
Start: 2021-05-24 | End: 2021-05-24

## 2021-05-24 RX ADMIN — ATORVASTATIN CALCIUM 80 MG: 40 TABLET, FILM COATED ORAL at 21:31

## 2021-05-24 RX ADMIN — DIVALPROEX SODIUM 1000 MG: 500 TABLET, FILM COATED, EXTENDED RELEASE ORAL at 21:31

## 2021-05-24 RX ADMIN — OLANZAPINE 10 MG: 5 TABLET, ORALLY DISINTEGRATING ORAL at 09:13

## 2021-05-24 RX ADMIN — BENZTROPINE MESYLATE 1 MG: 0.5 TABLET ORAL at 09:13

## 2021-05-24 RX ADMIN — HYDROCORTISONE: 10 CREAM TOPICAL at 09:17

## 2021-05-24 RX ADMIN — ASPIRIN 81 MG: 81 TABLET, COATED ORAL at 09:13

## 2021-05-24 RX ADMIN — WATER 10 ML: 1 INJECTION INTRAMUSCULAR; INTRAVENOUS; SUBCUTANEOUS at 14:59

## 2021-05-24 RX ADMIN — OLANZAPINE 10 MG: 10 INJECTION, POWDER, FOR SOLUTION INTRAMUSCULAR at 16:40

## 2021-05-24 RX ADMIN — OLANZAPINE 10 MG: 5 TABLET, ORALLY DISINTEGRATING ORAL at 11:27

## 2021-05-24 RX ADMIN — MEMANTINE 5 MG: 5 TABLET ORAL at 09:14

## 2021-05-24 RX ADMIN — HALOPERIDOL 10 MG: 5 TABLET ORAL at 21:31

## 2021-05-24 RX ADMIN — OLANZAPINE 10 MG: 5 TABLET, ORALLY DISINTEGRATING ORAL at 21:31

## 2021-05-24 RX ADMIN — WATER 10 ML: 1 INJECTION INTRAMUSCULAR; INTRAVENOUS; SUBCUTANEOUS at 16:39

## 2021-05-24 RX ADMIN — HALOPERIDOL 10 MG: 5 TABLET ORAL at 14:42

## 2021-05-24 RX ADMIN — Medication 800 UNITS: at 09:13

## 2021-05-24 RX ADMIN — BENZTROPINE MESYLATE 1 MG: 0.5 TABLET ORAL at 21:31

## 2021-05-24 RX ADMIN — OLANZAPINE 10 MG: 10 INJECTION, POWDER, FOR SOLUTION INTRAMUSCULAR at 14:59

## 2021-05-24 RX ADMIN — DOCUSATE SODIUM 100 MG: 100 CAPSULE, LIQUID FILLED ORAL at 09:14

## 2021-05-24 RX ADMIN — HALOPERIDOL 10 MG: 5 TABLET ORAL at 09:13

## 2021-05-24 RX ADMIN — DOCUSATE SODIUM 100 MG: 100 CAPSULE, LIQUID FILLED ORAL at 21:31

## 2021-05-24 RX ADMIN — LEVOTHYROXINE SODIUM 88 MCG: 88 TABLET ORAL at 09:14

## 2021-05-24 RX ADMIN — SALMETEROL XINAFOATE 1 PUFF: 50 POWDER, METERED ORAL; RESPIRATORY (INHALATION) at 09:14

## 2021-05-24 ASSESSMENT — ACTIVITIES OF DAILY LIVING (ADL)
ADLS_ACUITY_SCORE: 13

## 2021-05-24 NOTE — PROGRESS NOTES
"[facility]    Medicine Progress Note - Hospitalist Service       Date of Admission:  9/9/2020  Assessment & Plan       John Juárez is a 55 year old male admitted on 9/9/2020. PMHx of schizophrenia, hx of TBI, alcohol use disorder, COPD, hyperlipidemia and hypothyroidism who was admitted on 9/9/2020 for evaluation of aggressive behavior at his group home.    Was evaluated by Psychiatry and his condition stabilized, though his group home was unwilling to take him back and thus hospitalization has been prolonged awaiting new placement and guardianship. Under civil commitment through [facility].     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  Brought to hospital for evaluation of aggressive behaviors. Group home now unwilling to take him back. Has had numerous CODE 21s called this stay. Seen by psych, meds adjusted. Is currently under civil commitment and court hold through [facility] until 7/31/21.   plan  --haldol reduced ot 5 mg tid 5/17, increase to 10 mg TID again 5/23, because of increasing agitation  -- benztropine 1mg BID   -- zyprexa scheduled  -- prns available for agitation ( Zyprexa)  -- increased valproic acid to 750 mg daily  -- SW following for placement, placement will occur once guardianship is established  -- Psychiatry followup requested since patient's agitation is increasing.  I appreciate Dr. Wilks's evaluation and recommendations.  -- Per Dr. Wilks, optimize Depakote ER at 1000 mg nightly, check a level on Wednesday  -- Continue door alarm, he is under commitment guardianship is being pursued  -- Increase Zyprexa to 10 mg daily  -- Discontinued every 4 hours neuro checks  -- Also per Dr. Wilks, \"if he continues to be agitated to this degree, it may make sense to pursue adult psychiatry referral as this is a very difficult setting to " "manage and adjust his medications obviously, he has long-term needs in terms of placement and supervision, but efforts to assess and resolve his agitation have thus far been ineffective despite many medication changes\"    Possible Tardive Dyskinesia vs EPSE  Per psych assessment on 4/1/21, noted to have possible tardive dyskinesia vs extrapyramidal side effects of meds. At that time, recommended to increase Cogentin to 1mg BID and add vitamin E 800 international unit(s) daily.      Back cellulitis in 1/2021: Resolved  Rash  Noted on 1/24/21, initiated on cephalexin at that time. Received local wound care and completed 7-day course of antibiotics on 1/30  Patient complains of itchy area behind his left ear; use topical hydrocortisone cream twice daily     Baseline Hypotension  BP 90-110s systolic on average. Asymptomatic. No concerns     Hyperlipidemia  Chronic and stable on llow dose ASA and  atorvastatin     COPD  Not O2 dependent. Chronic and stable on salmeterol, albuterol prn     Hypothyroidism  Chronic and stable on levothyroxine     Tobacco use disorder  Using nicotine patch and PRN gum     Resting tremor of upper extremities  No acute issues      Hx of infective endocarditis with severe mitral and aortic regurgitation S/P bioprosthetic valve replacement (10/2015)  Systolic heart murmur  HFrEF, not in acute exacerbation: Follows with Dr. Dockery of Heart and Vascular Cardiology, last seen in 1/2020. Note heart murmur on exam, strongest in aortic region. No CP, SOB, dizziness, syncope.   * Echocardiogram in 5/2016 with EF 50%, no evidence for prosthetic mitral and aortic valve dysfunction.   - Follow-up with outpatient Cardiology upon dismissal from the hospital (on every 2 year follow-ups)   - Continue ASA      Hx of CVA: Note basal ganglia stroke in 2015. No focal neuro deficits aside from cognitive dysfunction as above.     Non-severe malnutrition  Malnutrition:    - Level of malnutrition: Non-Severe   - " "Based on: reduced intake, moderate/severe subcutaneous fat loss, moderate/severe muscle loss       Diet: Room Service  Diet  Combination Diet Regular Diet Adult; Safe Tray - with utensils  Snacks/Supplements Adult: Other; Bedtime snack; Between Meals  Room Service    DVT Prophylaxis: Pneumatic Compression Devices  Valentin Catheter: not present  Code Status: Full Code           Disposition Plan   Expected discharge: pending guardianship and placement  Entered: Yolanda Trinh MD 05/24/2021, 8:27 AM     The patient's care was discussed with the Patient and bedside RN    Yolanda Trinh MD  Hospitalist Service  [facility]  Contact information available via [facility] Paging/Directory    ______________________________________________________________________    Interval History    \"Why am I still here?\"  Patient has some complaint as other days, wants to leave hospital.  No new respiratory or GI complaints.    Data reviewed today: I reviewed all medications, new labs and imaging results over the last 24 hours. I personally reviewed no images or EKG's today.    Physical Exam   Vital Signs:                    Weight: 185 lbs 9.6 oz  General Appearance: Well nourished male in NAD, laying in bed  Respiratory: respirations non-labored   Cardiovascular: RRR  GI: abdomen is nondistended  Other:  alert, rambling speech, patient speaks in a low voice/mumbles oriented to self, moves all extremities equally and no facial asymmetry, mood is calm at the time of our interview     Data   No lab results found in last 7 days.    "

## 2021-05-24 NOTE — PLAN OF CARE
DATE & TIME: 5/24/21 Nights  Cognitive Concerns/ Orientation : A&O to self only; occasionally walk out of room. Redirectable.   BEHAVIOR & AGGRESSION TOOL COLOR: Yellow. Gets agitated easily but able to redirect back to his room this shift  ABNL VS/O2: VSS on RA  MOBILITY: Up independently, in room steady. SBA in halls, ambulating in halls frequently.  PAIN MANAGMENT: Denies  DIET: Regular  BOWEL/BLADDER: Continent  SKIN: redness behind Lt ear much improved, c/o itchiness, scalp also dry.  Drain/Devices: PIV SL.  D/C DATE: Discharge pending guardianship and placement. Civil commitment through [facility] until 7/31/21  Discharge Barriers: Guardianship  OTHER IMPORTANT INFO:  Agitated and restless at times, frequently asking to leave. COVID swab ordered, patient refused and became agitated. Haldol increased to 10 mg TIB. PRN given this shift.

## 2021-05-24 NOTE — PLAN OF CARE
DATE & TIME: 5/23/21 Evening shift  Cognitive Concerns/ Orientation : A&O to self only; slammed door a more times due to wanting to smoke and leaving; but redirectable. Def hearing voices this shift and responding to them  BEHAVIOR & AGGRESSION TOOL COLOR: Yellow. Gets agitated easily but able to redirect back to his room this shift  ABNL VS/O2: VSS on RA  MOBILITY: Up independently, in room steady. SBA in halls, ambulating in halls frequently.  PAIN MANAGMENT: Denies  DIET: Regular  BOWEL/BLADDER: Continent  SKIN: redness behind Lt ear much improved, c/o itchiness, scalp also dry.  Drain/Devices: PIV SL.  D/C DATE: Discharge pending guardianship and placement. Civil commitment through [facility] until 7/31/21  Discharge Barriers: Guardianship  OTHER IMPORTANT INFO:  Agitated and restless at times, frequently asking to leave. COVID swab ordered, patient refused and became agitated. Haldol increased to 10 mg TIB. PRN given this shift.

## 2021-05-24 NOTE — CODE/RAPID RESPONSE
[facility]    RRT Note:  CODE 21  5/24/2021   Time Called: 2: 49 PM    RRT called for: Code 21     Assessment & Plan   Behavior disturbance due to underlying mental health issues  Code 21 called after Mr. Juárez's behavior escalated to the point he slammed the door to room 602- so hard the window blind broke. Upon my arrival he was laying quietly in bed. He was evaluated by Dr. Wilks, Psychiatry, this AM and PO Zyprexa and Depakote were increased given recent agitation.     INTERVENTIONS:  - dose of PRN Zyprexa     Code Status: Full Code    Sandra Estrada, APRN, CNP  Hospitalist Service, House Officer  [facility]     Text Page  Pager: [phone]    Allergies   Allergies   Allergen Reactions     Ibuprofen      Latex        Physical Exam   Vital Signs with Ranges:  Temp:  [98.2  F (36.8  C)] 98.2  F (36.8  C)  Pulse:  [78] 78  Resp:  [18] 18  BP: (124)/(82) 124/82  SpO2:  [99 %] 99 %  I/O last 3 completed shifts:  In: 960 [P.O.:960]  Out: -     Constitutional: 55- year old male laying quietly in bed.    Pulmonary: No obvious acute distress or increased work of breathing.   Cardiovascular: Appears adequately perfused.   GI: Appears non-distended.   Skin/Integumen: No obvious new concerning rashes or lesions.   Neuro: Awake, alert, no obvious focal deficits.   Psych:  Calm, cooperative.  Extremities: Moves all extremities.     CBC with Diff:  Recent Labs   Lab Test 05/17/21  0805 06/02/14 2018 06/02/14 2018   WBC 5.4   < > 6.5   HGB 15.9   < > 14.2   MCV 92   < > 100      < > 122*   INR  --   --  0.99    < > = values in this interval not displayed.      Time Spent on this Encounter   I spent 15 minutes on the unit/floor managing the care of John Juárez. Over 50% of my time was spent counseling the patient and/or coordinating care regarding services listed in this note.

## 2021-05-24 NOTE — CONSULTS
[facility] Psychiatric Consult Progress Note    Interval History:   I met with the patient on station 66.  Routine follow-up was requested with respect to his psychiatric medications, and ongoing concern that he gets intermittently agitated requiring IM medications to manage him.  John is lying in bed this morning, eyes closed.  He is very soft-spoken, difficult to interview.  At this juncture it would make sense to titrate Depakote, recheck a level this Wednesday.  He is currently on Zyprexa and Haldol along with Cogentin.  Efforts to place him have been quite challenging because of the refractory agitation.  He really cannot give me much meaningful history   Review of systems:   10 point Review of Systems completed by Sushil Wilks MD , and is  is negative other than noted in the HPI     Medications:       aspirin  81 mg Oral Daily     atorvastatin  80 mg Oral At Bedtime     benztropine  1 mg Oral BID     divalproex sodium extended-release  1,000 mg Oral At Bedtime     docusate sodium  100 mg Oral BID     haloperidol  10 mg Oral TID     hydrocortisone   Topical BID     levothyroxine  88 mcg Oral Daily     memantine  5 mg Oral Daily     OLANZapine zydis  10 mg Oral BID     salmeterol  1 puff Inhalation BID     vitamin E  800 Units Oral Daily     acetaminophen, albuterol, alum & mag hydroxide-simethicone, haloperidol lactate, melatonin, naloxone **OR** naloxone **OR** naloxone **OR** naloxone, nicotine, nicotine, OLANZapine, OLANZapine zydis, ondansetron **OR** ondansetron, polyethylene glycol, polyethylene glycol-propylene glycol    Mental Status Examination:   Appearance: He appears fatigued, lying on his right side, eyes closed  Eye Contact: He seems to stare blankly at times when he does open his eyes, intermittently closing eyes during my visit   speech:  Impoverished, dysarthric, monotone, speaks in a whisper  language: Poor  Psychomotor Behavior:  some buccal-lingual dyskinesia,  "chewing movements  Mood: \"Mumbling\"  affect: Blunted   thought Process:  paucity of speech, no obvious loosening of associations flight of ideas or formal thought disorder  Thought Content:  no evidence of suicidal ideation or homicidal ideation, but at times he does tend to stare off into space, looks a guarded, but pleasant  Oriented to: Person only, he thought he was at the [facility] stating \"I am at [facility]\"  Attention Span and Concentration:  poor  Recent and Remote Memory:  limited  Fund of Knowledge: delayed  Insight:  limited  Judgment:  limited       Labs/Vitals:     No results found for this or any previous visit (from the past 24 hour(s)).  B/P: 123/95, T: 97.6, P: 89, R: 18  Impression  John is a 55-year-old gentleman with a known major neurocognitive disorder and schizophrenic spectrum illness.  He continues to be a challenge in terms of management with periods of intermittent agitation.  We will optimize Depakote, check a level on Wednesday.  His Zyprexa can be adjusted to 10 mg twice daily    DIagnoses:   1.  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   2.  Other schizophrenia spectrum disorder.   3.  Alcohol use disorder in remission.   4.  Stimulant use disorder, in remission.   5.  Chronic obstructive pulmonary disease.   6.  Hypertension.   7. R/O mild TD vs EPSE       Plan:   1.  Effexor has been discontinued  2.  Optimize Depakote ER at 1000 mg nightly  3.  Continue with door alarm, he is under commitment, it looks like they are pursuing guardianship  4.  Increase Zyprexa to 10 mg twice daily and increase Depakote ER 2000 mg at bedtime.  Check a Depakote level Wednesday   5.  If he continues to be agitated to this degree it may make sense to pursue adult psychiatry referral as this is a very difficult setting to manage and adjust his medication.  Obviously he has long-term needs in terms of placement and supervision, but " efforts to assess and resolve his agitation have thus far been ineffective despite many med changes.     attestation:  Patient has been seen and evaluated by me,  Sushil Wilks MD    Visit/Communication Style   In person, face-to-face

## 2021-05-24 NOTE — CODE/RAPID RESPONSE
[facility]    RRT Note:  CODE 21  5/24/2021   Time Called: 4: 31 PM    RRT called for: Code 21     Assessment & Plan   Behavior disturbance secondary to underlying mental health issues  Code 21 called for persistent slamming of room door and attempt to elope off floor. Upon my arrival Mr. Juárez was eating quietly in his bed. He was not able to get off the floor as a staff member held the door closed.     INTERVENTIONS:  - additional Zyprexa now  - need to highly consider moving Mr. Juárez to a locked unit given his increasing behavior disturbance and several attempts at elopement the past week     Code Status: Full Code    MAIA Guadalupe, CNP  Hospitalist Service, House Officer  [facility]     Text Page  Pager: [phone]

## 2021-05-24 NOTE — PLAN OF CARE
DATE & TIME: 5/24/21 Nights  Cognitive Concerns/ Orientation : A&O to self only; occasionally walks out of room. Redirectable most of the time.   BEHAVIOR & AGGRESSION TOOL COLOR: Yellow. Agitation episode x1 this morning, started throwing everything off the table and pushed table to the floor, code green called, however able to redirect pt back to his bed, received prn oral Zyprexa 10 mg x1.  ABNL VS/O2: VSS on RA  MOBILITY: Up independently, in room steady. SBA in halls, ambulating in halls frequently.  PAIN MANAGMENT: Denies  DIET: Regular  BOWEL/BLADDER: Continent  SKIN: redness behind Lt ear much improved, c/o itchiness, scalp also dry.  Drain/Devices: PIV SL.  D/C DATE: Discharge pending guardianship and placement. Civil commitment through [facility] until 7/31/21  Discharge Barriers: Guardianship  OTHER IMPORTANT INFO:  Agitated and restless at times, frequently asking to leave. COVID swab ordered, patient refused and became agitated. Psych saw the pt today, increased scheduled Zyprexa from 5 mg BID to 10 mg BID, increased bedtime Depakote ER from 750 mg to 1000 mg.     Update 1540  Called code 21 after pt's behavior escalated, pt slammed the door about 4 times, received prn IM Zyprexa 10 mg x1. Pt has been redirectable since, continuing sitter 1:1.     Update 1640  Called code 21 again at 1640, pt was agitated, tried to take the stairs to leave the floor. NP ordered another dose of IM Zyprexa 10 mg x1. Pt alert and able to redirect back to his bed.

## 2021-05-24 NOTE — CONSULTS
DEC  attempted to see patient, however patient was not able to be assessed through video visit due to psychosis.    Patient will be seen by psychiatrist in person on 5/24. For the evening, suggest restarting PRN lorazepam for now.    Denton Sorenson MD

## 2021-05-24 NOTE — PROGRESS NOTES
Care Management Follow Up    Length of Stay (days): 247    Expected Discharge Date: 05/31/21     Concerns to be Addressed: patient refuses services, discharge planning     Patient plan of care discussed at interdisciplinary rounds: Yes    Anticipated Discharge Disposition: (residential care home or nursing home)     Anticipated Discharge Services: None  Anticipated Discharge DME: None    Patient/family educated on Medicare website which has current facility and service quality ratings: (not applicable)  Education Provided on the Discharge Plan:    Patient/Family in Agreement with the Plan: no    Referrals Placed by CM/SW: Post Acute Facilities  Private pay costs discussed: Not applicable    Additional Information:  Today Dr Wilks recommended [facility]-psychiatry transfer because patient continues to have behavioral disturbance despite medication management attempts here.   Today patient threw furniture and slammed his door several times in a very strong manner.  Contacted [facility]al Intake at [phone].  None of their 3 hospital units have vacancies.  Call can be made again tomorrow and we can also contact the [facility] psychiatry [facility] based programs.  Barrier to a facility accepting patient may be that he is not decisional, his family is not willing to be involved and he doesn't have a legal decision maker.  The  who is assisting with guardianship is looking for a professional guardian to accept the role and once this is established the  can file the guardianship petition.  [facility] had [facility] by [facility] male secured unit once he has a guardian.  They will want to re-assess patient when we have an established guardian. Patient is also on the list for a State group home setting. He group homes are for patient's declined by community facilities.      ASIM CastilloSW

## 2021-05-25 PROCEDURE — 250N000013 HC RX MED GY IP 250 OP 250 PS 637: Performed by: INTERNAL MEDICINE

## 2021-05-25 PROCEDURE — 250N000013 HC RX MED GY IP 250 OP 250 PS 637: Performed by: NURSE PRACTITIONER

## 2021-05-25 PROCEDURE — 120N000001 HC R&B MED SURG/OB

## 2021-05-25 PROCEDURE — 99232 SBSQ HOSP IP/OBS MODERATE 35: CPT | Performed by: INTERNAL MEDICINE

## 2021-05-25 PROCEDURE — 250N000013 HC RX MED GY IP 250 OP 250 PS 637: Performed by: PSYCHIATRY & NEUROLOGY

## 2021-05-25 PROCEDURE — 99232 SBSQ HOSP IP/OBS MODERATE 35: CPT | Performed by: PSYCHIATRY & NEUROLOGY

## 2021-05-25 PROCEDURE — 250N000013 HC RX MED GY IP 250 OP 250 PS 637: Performed by: HOSPITALIST

## 2021-05-25 RX ORDER — LORAZEPAM 1 MG/1
1 TABLET ORAL EVERY 4 HOURS PRN
Status: DISCONTINUED | OUTPATIENT
Start: 2021-05-25 | End: 2021-06-15

## 2021-05-25 RX ADMIN — BENZTROPINE MESYLATE 1 MG: 0.5 TABLET ORAL at 09:30

## 2021-05-25 RX ADMIN — DIVALPROEX SODIUM 1000 MG: 500 TABLET, FILM COATED, EXTENDED RELEASE ORAL at 21:07

## 2021-05-25 RX ADMIN — SALMETEROL XINAFOATE 1 PUFF: 50 POWDER, METERED ORAL; RESPIRATORY (INHALATION) at 21:05

## 2021-05-25 RX ADMIN — BENZTROPINE MESYLATE 1 MG: 0.5 TABLET ORAL at 21:02

## 2021-05-25 RX ADMIN — MEMANTINE 5 MG: 5 TABLET ORAL at 09:30

## 2021-05-25 RX ADMIN — HALOPERIDOL 10 MG: 5 TABLET ORAL at 21:02

## 2021-05-25 RX ADMIN — LEVOTHYROXINE SODIUM 88 MCG: 88 TABLET ORAL at 09:31

## 2021-05-25 RX ADMIN — HALOPERIDOL 10 MG: 5 TABLET ORAL at 15:21

## 2021-05-25 RX ADMIN — ATORVASTATIN CALCIUM 80 MG: 40 TABLET, FILM COATED ORAL at 21:01

## 2021-05-25 RX ADMIN — HALOPERIDOL 10 MG: 5 TABLET ORAL at 09:29

## 2021-05-25 RX ADMIN — OLANZAPINE 10 MG: 5 TABLET, ORALLY DISINTEGRATING ORAL at 09:31

## 2021-05-25 RX ADMIN — OLANZAPINE 10 MG: 5 TABLET, ORALLY DISINTEGRATING ORAL at 21:03

## 2021-05-25 RX ADMIN — ASPIRIN 81 MG: 81 TABLET, COATED ORAL at 15:28

## 2021-05-25 RX ADMIN — HYDROCORTISONE: 10 CREAM TOPICAL at 15:27

## 2021-05-25 RX ADMIN — Medication 800 UNITS: at 09:30

## 2021-05-25 RX ADMIN — DOCUSATE SODIUM 100 MG: 100 CAPSULE, LIQUID FILLED ORAL at 21:02

## 2021-05-25 RX ADMIN — MELATONIN TAB 3 MG 3 MG: 3 TAB at 21:01

## 2021-05-25 RX ADMIN — DOCUSATE SODIUM 100 MG: 100 CAPSULE, LIQUID FILLED ORAL at 09:31

## 2021-05-25 RX ADMIN — OLANZAPINE 10 MG: 5 TABLET, ORALLY DISINTEGRATING ORAL at 15:31

## 2021-05-25 RX ADMIN — HYDROCORTISONE: 10 CREAM TOPICAL at 21:04

## 2021-05-25 RX ADMIN — SALMETEROL XINAFOATE 1 PUFF: 50 POWDER, METERED ORAL; RESPIRATORY (INHALATION) at 15:28

## 2021-05-25 ASSESSMENT — ACTIVITIES OF DAILY LIVING (ADL)
ADLS_ACUITY_SCORE: 13

## 2021-05-25 NOTE — PROGRESS NOTES
"[facility]    Hospitalist Progress Note    Interval History   - Code 21 called yesterday as patient threw furniture while staff was in room, and also attempted to elope  - Calm and sleepy this morning  - Patient is calmer in the mornings and generally more confused, agitated, and frankly, violent in the afternoons. For staff and patient safety, this patient would be best served in a locked unit with closer psychiatric invovlvement  - Continue 1:1 per nursing discretion    Assessment & Plan   Summary: John Juárez is a 55 year old male with PMHx of schizophrenia, hx of TBI, alcohol use disorder, COPD, hyperlipidemia and hypothyroidism who was admitted on 9/9/2020 for evaluation of aggressive behavior at his group home.    Was evaluated by Psychiatry and his condition stabilized, though his group home was unwilling to take him back and thus hospitalization has been prolonged awaiting new placement and guardianship. Under civil commitment through [facility].   Noted increasingly agitated behavior and attempts at elopement in May 2021.     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  Brought to hospital for evaluation of aggressive behaviors. Group home now unwilling to take him back. Has had numerous CODE 21s called this stay. Seen by psych, meds adjusted. Is currently under civil commitment and court hold through [facility] until 7/31/21.  - Appreciate Psychiatry involvement   - Haldol reduced ot 5 mg tid 5/17, increase to 10 mg TID again 5/23, because of increasing agitation   - Benztropine 1mg BID    - Zyprexa 10mg BID   - Valproic acid 100mg at bedtime  - Door alarm, 1:1 as needed  - SW following for placement, placement will occur once guardianship is established  - Also per Dr. Wilks on 5/25, \"if he continues to be agitated to this degree, it may make sense to " "pursue adult psychiatry referral as this is a very difficult setting to manage and adjust his medications obviously, he has long-term needs in terms of placement and supervision, but efforts to assess and resolve his agitation have thus far been ineffective despite many medication changes\"    Chronic/Stable/Resolved  Possible Tardive Dyskinesia vs EPSE  Per psych assessment on 4/1/21, noted to have possible tardive dyskinesia vs extrapyramidal side effects of meds. At that time, recommended to increase Cogentin to 1mg BID and add vitamin E 800 international unit(s) daily.      Back cellulitis in 1/2021: Resolved  Noted on 1/24/21, initiated on cephalexin at that time. Received local wound care and completed 7-day course of antibiotics on 1/30.     Baseline Hypotension: BP 90-110s systolic on average. Asymptomatic. No concerns     Hyperlipidemia: Chronic and stable on llow dose ASA and  atorvastatin     COPD: Not O2 dependent. Chronic and stable on salmeterol, albuterol prn     Hypothyroidism: Chronic and stable on levothyroxine     Tobacco use disorder: Using nicotine patch and PRN gum     Hx of infective endocarditis with severe mitral and aortic regurgitation S/P bioprosthetic valve replacement (10/2015)  Systolic heart murmur  HFrEF, not in acute exacerbation: Follows with Dr. Dockery of Heart and Vascular Cardiology, last seen in 1/2020. Note heart murmur on exam, strongest in aortic region. No CP, SOB, dizziness, syncope. Echocardiogram in 5/2016 with EF 50%, no evidence for prosthetic mitral and aortic valve dysfunction.   - Follow-up with outpatient Cardiology upon dismissal from the hospital (on every 2 year follow-ups)   - Continue ASA      Hx of CVA: Note basal ganglia stroke in 2015. No focal neuro deficits aside from cognitive dysfunction as above.     Non-severe malnutrition    DVT Prophylaxis: Patient is ambulatory  Code Status: Full Code  PT/OT: not needed  Diet: Room Service  Diet  Combination Diet " Regular Diet Adult; Safe Tray - with utensils  Snacks/Supplements Adult: Other; Bedtime snack; Between Meals  Room Service      Disposition: Expected discharge pending social work, guardianship, placement    Chico Flor MD  Text Page  (7am to 6pm)  -Data reviewed today: I reviewed all new labs and imaging results over the last 24 hours.    Physical Exam   Temp: 97.5  F (36.4  C) Temp src: Oral BP: 120/85 Pulse: 85   Resp: 18 SpO2: 96 % O2 Device: None (Room air)    Vitals:    12/11/20 0700 03/19/21 2120 03/31/21 1700   Weight: 82.6 kg (182 lb) 84.4 kg (186 lb) 84.2 kg (185 lb 9.6 oz)     Vital Signs with Ranges  Temp:  [97.5  F (36.4  C)-98.2  F (36.8  C)] 97.5  F (36.4  C)  Pulse:  [78-85] 85  Resp:  [18] 18  BP: (120-124)/(82-85) 120/85  SpO2:  [96 %-99 %] 96 %  I/O last 3 completed shifts:  In: 1120 [P.O.:1120]  Out: -   O2 requirements: none    Constitutional: Male in NAD, somnolent  HEENT: Eyes nonicteric, oral mucosa moist  Cardiovascular: RRR, normal S1/2, no m/r/g  Respiratory: CTAB, no wheezing or crackles  Vascular: No LE pitting edema  GI: Normoactive bowel sounds, nontender, nondistended  Skin/Integumen: No rashes  Neuro/Psych: Sleepy, restricted affect, moves all extremities    Medications       aspirin  81 mg Oral Daily     atorvastatin  80 mg Oral At Bedtime     benztropine  1 mg Oral BID     divalproex sodium extended-release  1,000 mg Oral At Bedtime     docusate sodium  100 mg Oral BID     haloperidol  10 mg Oral TID     hydrocortisone   Topical BID     levothyroxine  88 mcg Oral Daily     memantine  5 mg Oral Daily     OLANZapine zydis  10 mg Oral BID     salmeterol  1 puff Inhalation BID     vitamin E  800 Units Oral Daily       Data   No lab results found in last 7 days.    Imaging:   No results found for this or any previous visit (from the past 24 hour(s)).

## 2021-05-25 NOTE — PLAN OF CARE
"  Cognitive Concerns/ Orientation : Pt A/Ox1, oriented to self only.    BEHAVIOR & AGGRESSION TOOL COLOR: green. Pt asks \"Why am I still here\"   ABNL VS/O2: VSS on RA  MOBILITY: Independent  PAIN MANAGMENT: Denies  DIET: Regular  BOWEL/BLADDER: Continent  DRAIN/DEVICES: No IV access  SKIN: Redness behind ear  D/C DATE: Discharge pending guardianship and placement. Civil commitment through [facility]  until 7/31/21  Discharge Barriers: Guardianship  OTHER IMPORTANT INFO: Agitated and restless last evening. Pt slept well overnight,, has showered and walked in [place] with sitter. Prn zyprexa at 1500 when pt began talking to himself,swearing, starting to appear agitated    "

## 2021-05-25 NOTE — PLAN OF CARE
DATE & TIME: 5/24/21 [id number]    Cognitive Concerns/ Orientation : Pt A/Ox1, oriented to self only.    BEHAVIOR & AGGRESSION TOOL COLOR: Yellow, pt very agitated this evening. Pt came out of room threw chair down and said he was sitting there until he had a ride home. Talked patient into going back to room where he slammed his door. Pt yelling at voices in room.   ABNL VS/O2: VSS on RA  MOBILITY: Independent  PAIN MANAGMENT: Denies  DIET: Regular  BOWEL/BLADDER: Continent  DRAIN/DEVICES: No IV access  SKIN: Redness behind ear  D/C DATE: Discharge pending guardianship and placement. Civil commitment through [facility]  until 7/31/21  Discharge Barriers: Guardianship  OTHER IMPORTANT INFO: Agitated and restless this evening. Pt slept well overnight, woke up and came out of room a couple times.

## 2021-05-25 NOTE — CONSULTS
[facility] Psychiatric Consult Progress Note    Interval History:   I met with the patient on station 66. Follow-up was requested with respect to his psychiatric medications, and ongoing concern that he gets intermittently agitated requiring IM medications to manage him.  Yestrerday afternoon had severe episode of agitation, violetnly slammingh the door several times.  John was again in more better spirits this morning. However,he acknowledges he is depressed and anxious at times, referring to remaining the hospital so long. Sleeping ok. Denies suicidal or homicidal ideation. Intact appetite. Denies being in any pain.   He is very soft spoke, mumbling a lot so hard to understand much of what he says.   Depakote was recently up titrated by Dr. Wilks with plan to recheck level tomorrow.    Review of systems:   10 point Review of Systems completed by Lio Murrieta MD , and is  is negative other than noted in the HPI     Medications:       aspirin  81 mg Oral Daily     atorvastatin  80 mg Oral At Bedtime     benztropine  1 mg Oral BID     divalproex sodium extended-release  1,000 mg Oral At Bedtime     docusate sodium  100 mg Oral BID     haloperidol  10 mg Oral TID     hydrocortisone   Topical BID     levothyroxine  88 mcg Oral Daily     memantine  5 mg Oral Daily     OLANZapine zydis  10 mg Oral BID     salmeterol  1 puff Inhalation BID     vitamin E  800 Units Oral Daily     acetaminophen, albuterol, alum & mag hydroxide-simethicone, haloperidol lactate, melatonin, naloxone **OR** naloxone **OR** naloxone **OR** naloxone, nicotine, nicotine, OLANZapine, OLANZapine zydis, ondansetron **OR** ondansetron, polyethylene glycol, polyethylene glycol-propylene glycol    Mental Status Examination:   Appearance: He appears fatigued, lying on his right side, eyes closed  Eye Contact: He seems to stare blankly at times when he does open his eyes, intermittently closing eyes during my visit   speech:   "Impoverished, dysarthric, monotone, speaks in a whisper  language: Poor  Psychomotor Behavior:  some buccal-lingual dyskinesia, chewing movements  Mood: \"Mumbling\"  affect: Blunted   thought Process:  paucity of speech, no obvious loosening of associations flight of ideas or formal thought disorder  Thought Content:  no evidence of suicidal ideation or homicidal ideation, but at times he does tend to stare off into space, looks a guarded, but pleasant  Oriented to: Person only, he thought he was at the [facility] stating \"I am at [facility]\"  Attention Span and Concentration:  poor  Recent and Remote Memory:  limited  Fund of Knowledge: delayed  Insight:  limited  Judgment:  limited       Labs/Vitals:     No results found for this or any previous visit (from the past 24 hour(s)).  B/P: 123/95, T: 97.6, P: 89, R: 18  Impression  John is a 55-year-old gentleman with a known major neurocognitive disorder and schizophrenic spectrum illness.  He continues to be a challenge in terms of management with periods of intermittent agitation.  Dr. Wilks recently increased Depakote, with plan to check a level on Wednesday.  His Zyprexa was increased to 10 mg twice daily yesterday but had severe agitation requiring 10 mg IM doses x2 in the afternoon yesterday. This morning he was much more calm again as apparently is his pattern of doing better in the morning. He is understandably very frustrated about being in the hospital during this prolonged admission.     DIagnoses:   1.  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   2.  Other schizophrenia spectrum disorder.   3.  Alcohol use disorder in remission.   4.  Stimulant use disorder, in remission.   5.  Chronic obstructive pulmonary disease.   6.  Hypertension.   7. R/O mild TD vs EPSE       Plan:   1.   Depakote ER at 1000 mg nightly (just increased, recheck level tomorrow)  2.  Continue with door alarm, " he is under commitment, it looks like they are pursuing guardianship  3.  Zyprexa to 10 mg twice daily and increase Depakote ER 2000 mg at bedtime.  Check a Depakote level Wednesday   4.  Recommend  adult psychiatry referral as this is a very difficult setting to manage and adjust his medication. Would benefit from locked unit while awaiting disposition from commitment. I really hope the [facility] can help this gentleman proceed to the next step in his care given how long his hospitalization has been   5.  Given severity of underlying breakthrough agitation despite high dosing multiple antipsychotics, Adding lorazepam 1 mg tabs, PRN for breakthrough agitation. Can be given IM if needed but would have to be  by at least 1 hour from IM olanzapine due to risk of potentially dangerous excessive hypotension    attestation:  Patient has been seen and evaluated by me,  Lio Murrieta MD    Visit/Communication Style   In person, face-to-face

## 2021-05-26 ENCOUNTER — TELEPHONE (OUTPATIENT)
Dept: BEHAVIORAL HEALTH | Facility: CLINIC | Age: 56
End: 2021-05-26

## 2021-05-26 LAB — VALPROATE SERPL-MCNC: 104 MG/L (ref 50–100)

## 2021-05-26 PROCEDURE — 99232 SBSQ HOSP IP/OBS MODERATE 35: CPT | Performed by: PSYCHIATRY & NEUROLOGY

## 2021-05-26 PROCEDURE — 36415 COLL VENOUS BLD VENIPUNCTURE: CPT | Performed by: PSYCHIATRY & NEUROLOGY

## 2021-05-26 PROCEDURE — 250N000013 HC RX MED GY IP 250 OP 250 PS 637: Performed by: INTERNAL MEDICINE

## 2021-05-26 PROCEDURE — 80164 ASSAY DIPROPYLACETIC ACD TOT: CPT | Performed by: PSYCHIATRY & NEUROLOGY

## 2021-05-26 PROCEDURE — 99231 SBSQ HOSP IP/OBS SF/LOW 25: CPT | Performed by: INTERNAL MEDICINE

## 2021-05-26 PROCEDURE — 250N000013 HC RX MED GY IP 250 OP 250 PS 637: Performed by: HOSPITALIST

## 2021-05-26 PROCEDURE — 120N000001 HC R&B MED SURG/OB

## 2021-05-26 PROCEDURE — 250N000013 HC RX MED GY IP 250 OP 250 PS 637: Performed by: PSYCHIATRY & NEUROLOGY

## 2021-05-26 RX ORDER — RISPERIDONE 1 MG/1
1 TABLET, ORALLY DISINTEGRATING ORAL EVERY 6 HOURS PRN
Status: DISCONTINUED | OUTPATIENT
Start: 2021-05-26 | End: 2021-06-30 | Stop reason: HOSPADM

## 2021-05-26 RX ORDER — RISPERIDONE 1 MG/1
1 TABLET, ORALLY DISINTEGRATING ORAL 2 TIMES DAILY
Status: DISCONTINUED | OUTPATIENT
Start: 2021-05-26 | End: 2021-05-28

## 2021-05-26 RX ADMIN — MEMANTINE 5 MG: 5 TABLET ORAL at 08:31

## 2021-05-26 RX ADMIN — RISPERIDONE 1 MG: 1 TABLET, ORALLY DISINTEGRATING ORAL at 20:56

## 2021-05-26 RX ADMIN — ASPIRIN 81 MG: 81 TABLET, COATED ORAL at 08:31

## 2021-05-26 RX ADMIN — HALOPERIDOL 10 MG: 5 TABLET ORAL at 08:31

## 2021-05-26 RX ADMIN — HALOPERIDOL 10 MG: 5 TABLET ORAL at 20:55

## 2021-05-26 RX ADMIN — LEVOTHYROXINE SODIUM 88 MCG: 88 TABLET ORAL at 08:31

## 2021-05-26 RX ADMIN — BENZTROPINE MESYLATE 1 MG: 0.5 TABLET ORAL at 08:31

## 2021-05-26 RX ADMIN — HYDROCORTISONE: 10 CREAM TOPICAL at 08:31

## 2021-05-26 RX ADMIN — SALMETEROL XINAFOATE 1 PUFF: 50 POWDER, METERED ORAL; RESPIRATORY (INHALATION) at 20:58

## 2021-05-26 RX ADMIN — DIVALPROEX SODIUM 1000 MG: 500 TABLET, FILM COATED, EXTENDED RELEASE ORAL at 20:56

## 2021-05-26 RX ADMIN — DOCUSATE SODIUM 100 MG: 100 CAPSULE, LIQUID FILLED ORAL at 20:56

## 2021-05-26 RX ADMIN — RISPERIDONE 1 MG: 1 TABLET, ORALLY DISINTEGRATING ORAL at 09:52

## 2021-05-26 RX ADMIN — ATORVASTATIN CALCIUM 80 MG: 40 TABLET, FILM COATED ORAL at 20:55

## 2021-05-26 RX ADMIN — MELATONIN TAB 3 MG 3 MG: 3 TAB at 20:56

## 2021-05-26 RX ADMIN — HYDROCORTISONE: 10 CREAM TOPICAL at 20:58

## 2021-05-26 RX ADMIN — HALOPERIDOL 10 MG: 5 TABLET ORAL at 14:23

## 2021-05-26 RX ADMIN — BENZTROPINE MESYLATE 1 MG: 0.5 TABLET ORAL at 20:55

## 2021-05-26 RX ADMIN — SALMETEROL XINAFOATE 1 PUFF: 50 POWDER, METERED ORAL; RESPIRATORY (INHALATION) at 08:31

## 2021-05-26 RX ADMIN — DOCUSATE SODIUM 100 MG: 100 CAPSULE, LIQUID FILLED ORAL at 08:31

## 2021-05-26 RX ADMIN — Medication 800 UNITS: at 08:31

## 2021-05-26 ASSESSMENT — ACTIVITIES OF DAILY LIVING (ADL)
ADLS_ACUITY_SCORE: 13

## 2021-05-26 NOTE — PROGRESS NOTES
[facility]    Hospitalist Progress Note    Assessment & Plan   55 year old male with PMHx of schizophrenia, hx of TBI, alcohol use disorder, COPD, hyperlipidemia and hypothyroidism who was admitted on 9/9/2020 for evaluation of aggressive behavior at his group home.    Was evaluated by Psychiatry and his condition stabilized, though his group home was unwilling to take him back and thus hospitalization has been prolonged awaiting new placement and guardianship. Under civil commitment through [facility].   Noted increasingly agitated behavior and attempts at elopement in May 2021.     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     Chronic/Stable/Resolved  Possible Tardive Dyskinesia vs EPSE  Per psych assessment on 4/1/21, noted to have possible tardive dyskinesia vs extrapyramidal side effects of meds. At that time, recommended to increase Cogentin to 1mg BID and add vitamin E 800 international unit(s) daily.      Back cellulitis in 1/2021: Resolved  Noted on 1/24/21, initiated on cephalexin at that time. Received local wound care and completed 7-day course of antibiotics on 1/30.     Baseline Hypotension: BP 90-110s systolic on average. Asymptomatic. No concerns     Hyperlipidemia: Chronic and stable on llow dose ASA and  atorvastatin     COPD: Not O2 dependent. Chronic and stable on salmeterol, albuterol prn     Hypothyroidism: Chronic and stable on levothyroxine     Tobacco use disorder: Using nicotine patch and PRN gum     Hx of infective endocarditis with severe mitral and aortic regurgitation S/P bioprosthetic valve replacement (10/2015)  Systolic heart murmur  HFrEF, not in acute exacerbation: Follows with Dr. Dockery of Heart and Vascular Cardiology, last seen in 1/2020. Note heart  murmur on exam, strongest in aortic region. No CP, SOB, dizziness, syncope. Echocardiogram in 5/2016 with EF 50%, no evidence for prosthetic mitral and aortic valve dysfunction.   - Follow-up with outpatient Cardiology upon dismissal from the hospital (on every 2 year follow-ups)   - Continue ASA      Hx of CVA: Note basal ganglia stroke in 2015. No focal neuro deficits aside from cognitive dysfunction as above.     Non-severe malnutrition    DVT Prophylaxis: Patient is ambulatory  Code Status: Full Code  PT/OT: not needed  Diet: Room Service  Diet  Combination Diet Regular Diet Adult; Safe Tray - with utensils  Snacks/Supplements Adult: Other; Bedtime snack; Between Meals  Room Service      Disposition: Expected discharge when appropriate facility available    Deuce Turner MD  Text Page  (7am to 6pm)  Subjective -- code 21 yesterday for door slamming, resting in bed today.     Physical Exam                      Vitals:    12/11/20 0700 03/19/21 2120 03/31/21 1700   Weight: 82.6 kg (182 lb) 84.4 kg (186 lb) 84.2 kg (185 lb 9.6 oz)     Vital Signs with Ranges     I/O last 3 completed shifts:  In: 1000 [P.O.:1000]  Out: -   O2 requirements: none    Constitutional: Male in NAD, somnolent  Cardiovascular: RRR, normal S1/2, no m/r/g  Respiratory: CTAB, no wheezing or crackles  Vascular: No LE pitting edema  GI: Normoactive bowel sounds, nontender, nondistended  Skin/Integumen: No rashes  Neuro/Psych: Sleepy, flat affect, no focal deficits     Medications       aspirin  81 mg Oral Daily     atorvastatin  80 mg Oral At Bedtime     benztropine  1 mg Oral BID     divalproex sodium extended-release  1,000 mg Oral At Bedtime     docusate sodium  100 mg Oral BID     haloperidol  10 mg Oral TID     hydrocortisone   Topical BID     levothyroxine  88 mcg Oral Daily     memantine  5 mg Oral Daily     risperiDONE  1 mg Sublingual BID     salmeterol  1 puff Inhalation BID     vitamin E  800 Units Oral Daily       Data   No  lab results found in last 7 days.    Imaging:   No results found for this or any previous visit (from the past 24 hour(s)).

## 2021-05-26 NOTE — CONSULTS
[facility] Psychiatric Consult Progress Note    Interval History:   I met with the patient on station 66.  Routine follow-up was requested with respect to his psychiatric medications, and ongoing concern that he gets intermittently agitated requiring IM medications to manage him.  John is sonorous this morning.  He tends to get agitated in the evenings.  He has been on scheduled Haldol along with Zyprexa.  The Zyprexa seems to sedate him.  He really cannot give me meaningful history but staff report that he starts hearing voices and seems to become agitated, often trying to leave his room, he becomes physically menacing.  This is becoming a safety issue.  Its been challenging in this setting, so we talked about considering psychiatry transfer over to [facility].  I am going to put a call into central intake this morning.  I think it would be reasonable to switch him from Zyprexa to Risperdal which has properties more similar to Haldol.  He had been in a group home prior to admission, on scheduled Haldol.  A Depakote level is forthcoming.   Review of systems:   10 point Review of Systems completed by Sushil Wilks MD , and is  is negative other than noted in the HPI     Medications:       aspirin  81 mg Oral Daily     atorvastatin  80 mg Oral At Bedtime     benztropine  1 mg Oral BID     divalproex sodium extended-release  1,000 mg Oral At Bedtime     docusate sodium  100 mg Oral BID     haloperidol  10 mg Oral TID     hydrocortisone   Topical BID     levothyroxine  88 mcg Oral Daily     memantine  5 mg Oral Daily     risperiDONE  1 mg Sublingual BID     salmeterol  1 puff Inhalation BID     vitamin E  800 Units Oral Daily     acetaminophen, albuterol, alum & mag hydroxide-simethicone, haloperidol lactate, LORazepam, melatonin, naloxone **OR** naloxone **OR** naloxone **OR** naloxone, nicotine, nicotine, ondansetron **OR** ondansetron, polyethylene glycol, polyethylene  "glycol-propylene glycol, risperiDONE    Mental Status Examination:   Appearance: He appears fatigued, lying on his right side, eyes closed, sonorous, really not able to give meaningful history  Eye Contact: He seems to stare blankly at times when he does open his eyes, intermittently closing eyes during my visit   speech:  Impoverished, dysarthric, monotone, speaks in a whisper  language: Poor  Psychomotor Behavior:  some buccal-lingual dyskinesia, chewing movements  Mood: \"Mumbling\"  affect: Blunted   thought Process:  paucity of speech, no obvious loosening of associations flight of ideas or formal thought disorder  Thought Content:  no evidence of suicidal ideation or homicidal ideation, but at times he does tend to stare off into space, looks a guarded, but pleasant  Oriented to: Person only, he thought he was at the [facility] stating \"I am at [facility]\"  Attention Span and Concentration:  poor  Recent and Remote Memory:  limited  Fund of Knowledge: delayed  Insight:  limited  Judgment:  limited       Labs/Vitals:     No results found for this or any previous visit (from the past 24 hour(s)).  B/P: 123/95, T: 97.6, P: 89, R: 18  Impression  John is a 55-year-old gentleman with a known major neurocognitive disorder and schizophrenic spectrum illness.  He has a very challenging case.  I would argue at this point that trying to manage him in this setting is just not appropriate.  He would be better on a locked psychiatry unit, he is under commitment.  I will contact central intake to ask for their assistance.I am skepticalgeriatric psychiatry will take him, he is right on the edge in terms of age at 55    DIagnoses:   1.  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   2.  Other schizophrenia spectrum disorder.   3.  Alcohol use disorder in remission.   4.  Stimulant use disorder, in remission.   5.  Chronic obstructive pulmonary disease.   6. "  Hypertension.   7. R/O mild TD vs EPSE       Plan:   1.  Effexor has been discontinued  2.  Continue Depakote ER at 1000 mg nightly-level is pending today  3.  Continue with door alarm, he is under commitment, it looks like they are pursuing guardianship  4.  Discontinue Zyprexa, start Risperdal M tab 1 mg twice daily, 1 mg every 6 hours as needed.  IM Haldol is available   5.  I will ask if central intake will review this case for psychiatry transfer, trying to manage him in this setting has been extremely difficult, and he would benefit from closer supervision in a locked setting.  The only other consideration would be geriatric psychiatry, which may be a difficult disposition to an outside facility given the circumstances.      Attestation:  Patient has been seen and evaluated by me,  Sushil Wilks MD    Visit/Communication Style   In person, face-to-face

## 2021-05-26 NOTE — TELEPHONE ENCOUNTER
R:  5/26/21  8:30 AM  Call from station 66 Dr. Wilks requesting IP MH transfer as patient has become too difficult for the medical floor.  Writer notified intake leadership of above request and will wait for direction.

## 2021-05-26 NOTE — PLAN OF CARE
"Cognitive Concerns/ Orientation : Pt A/Ox1, oriented to self only.    BEHAVIOR & AGGRESSION TOOL COLOR: Green. Pt asks \"Why am I still here\" Calm this shift  ABNL VS/O2: VSS on RA  MOBILITY: Independent  PAIN MANAGMENT: Denies  DIET: Regular  BOWEL/BLADDER: Continent  DRAIN/DEVICES: No IV access  SKIN: Redness behind ear  D/C DATE: Discharge pending guardianship and placement. Civil commitment through [facility] until 7/31/21  Discharge Barriers: Guardianship  OTHER IMPORTANT INFO: Calm and cooperative this shift, redirectable.     "

## 2021-05-26 NOTE — PLAN OF CARE
DATE & TIME: 5/26/2021 07-19  Cognitive Concerns/ Orientation : Pt A/Ox1, oriented to self only.    BEHAVIOR & AGGRESSION TOOL COLOR: Green. Sleepy this am. Calm this shift   ABNL VS/O2: VSS on RA  MOBILITY: Independent in room  PAIN MANAGMENT: Denies  DIET: Regular  BOWEL/BLADDER: Continent  DRAIN/DEVICES: No IV access  SKIN: Redness behind ear  D/C DATE: Discharge pending guardianship and placement. Civil commitment through [facility] until 7/31/21  Discharge Barriers: Guardianship  OTHER IMPORTANT INFO: Calm and cooperative at times,, redirectable.  A few episodes when pt came out to nurses station stating his ride was coming or to give him his duffel as he is leaving. Pt would stomp into his room and slam his door.When pt in room  was hitting his fist into other hand , muttering obscenities. Pt asked about his court hold. Med change per psychiatry.   No

## 2021-05-27 PROCEDURE — 250N000013 HC RX MED GY IP 250 OP 250 PS 637: Performed by: INTERNAL MEDICINE

## 2021-05-27 PROCEDURE — 99231 SBSQ HOSP IP/OBS SF/LOW 25: CPT | Performed by: INTERNAL MEDICINE

## 2021-05-27 PROCEDURE — 120N000001 HC R&B MED SURG/OB

## 2021-05-27 PROCEDURE — 250N000013 HC RX MED GY IP 250 OP 250 PS 637: Performed by: HOSPITALIST

## 2021-05-27 PROCEDURE — 250N000013 HC RX MED GY IP 250 OP 250 PS 637: Performed by: PSYCHIATRY & NEUROLOGY

## 2021-05-27 RX ADMIN — BENZTROPINE MESYLATE 1 MG: 0.5 TABLET ORAL at 21:03

## 2021-05-27 RX ADMIN — LEVOTHYROXINE SODIUM 88 MCG: 88 TABLET ORAL at 08:14

## 2021-05-27 RX ADMIN — BENZTROPINE MESYLATE 1 MG: 0.5 TABLET ORAL at 08:13

## 2021-05-27 RX ADMIN — DIVALPROEX SODIUM 1000 MG: 500 TABLET, FILM COATED, EXTENDED RELEASE ORAL at 21:03

## 2021-05-27 RX ADMIN — SALMETEROL XINAFOATE 1 PUFF: 50 POWDER, METERED ORAL; RESPIRATORY (INHALATION) at 21:05

## 2021-05-27 RX ADMIN — MEMANTINE 5 MG: 5 TABLET ORAL at 08:14

## 2021-05-27 RX ADMIN — ATORVASTATIN CALCIUM 80 MG: 40 TABLET, FILM COATED ORAL at 21:03

## 2021-05-27 RX ADMIN — RISPERIDONE 1 MG: 1 TABLET, ORALLY DISINTEGRATING ORAL at 08:13

## 2021-05-27 RX ADMIN — HALOPERIDOL 10 MG: 5 TABLET ORAL at 08:13

## 2021-05-27 RX ADMIN — HALOPERIDOL 10 MG: 5 TABLET ORAL at 21:03

## 2021-05-27 RX ADMIN — HYDROCORTISONE: 10 CREAM TOPICAL at 08:15

## 2021-05-27 RX ADMIN — ASPIRIN 81 MG: 81 TABLET, COATED ORAL at 08:14

## 2021-05-27 RX ADMIN — HALOPERIDOL 10 MG: 5 TABLET ORAL at 14:13

## 2021-05-27 RX ADMIN — MELATONIN TAB 3 MG 3 MG: 3 TAB at 21:03

## 2021-05-27 RX ADMIN — Medication 800 UNITS: at 08:13

## 2021-05-27 RX ADMIN — DOCUSATE SODIUM 100 MG: 100 CAPSULE, LIQUID FILLED ORAL at 08:14

## 2021-05-27 RX ADMIN — DOCUSATE SODIUM 100 MG: 100 CAPSULE, LIQUID FILLED ORAL at 21:03

## 2021-05-27 RX ADMIN — RISPERIDONE 1 MG: 1 TABLET, ORALLY DISINTEGRATING ORAL at 15:36

## 2021-05-27 RX ADMIN — RISPERIDONE 1 MG: 1 TABLET, ORALLY DISINTEGRATING ORAL at 21:03

## 2021-05-27 RX ADMIN — SALMETEROL XINAFOATE 1 PUFF: 50 POWDER, METERED ORAL; RESPIRATORY (INHALATION) at 08:15

## 2021-05-27 ASSESSMENT — ACTIVITIES OF DAILY LIVING (ADL)
ADLS_ACUITY_SCORE: 12
ADLS_ACUITY_SCORE: 13
ADLS_ACUITY_SCORE: 13
ADLS_ACUITY_SCORE: 12
ADLS_ACUITY_SCORE: 12
ADLS_ACUITY_SCORE: 13
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
## 2021-05-27 NOTE — PROGRESS NOTES
Care Management Follow Up    Length of Stay (days): 250    Expected Discharge Date: 05/31/21     Concerns to be Addressed: patient refuses services, discharge planning     Patient plan of care discussed at interdisciplinary rounds: Yes    Anticipated Discharge Disposition: (residential care home or nursing home)     Anticipated Discharge Services: None  Anticipated Discharge DME: None    Patient/family educated on Medicare website which has current facility and service quality ratings: (not applicable)  Education Provided on the Discharge Plan:    Patient/Family in Agreement with the Plan: no    Referrals Placed by CM/SW: Post Acute Facilities  Private pay costs discussed: Not applicable    Additional Information:  On 5/26, Dr Wilks did request [facility] accept patient onto one of their units.    This request is being reviewed by Behavioral Intake.  Awaiting response.         ASIM CastilloSW

## 2021-05-27 NOTE — PLAN OF CARE
Cognitive Concerns/ Orientation : Pt A/Ox1, oriented to self only.    BEHAVIOR & AGGRESSION TOOL COLOR: Green,can be yellow at times  ABNL VS/O2: VSS on RA  MOBILITY: Independent in room  PAIN MANAGMENT: Denies  DIET: Regular  BOWEL/BLADDER: Continent  DRAIN/DEVICES: No IV access  SKIN: Redness behind ear  D/C DATE: Discharge pending guardianship and placement. Civil commitment through [facility] until 7/31/21  Discharge Barriers: Guardianship  OTHER IMPORTANT INFO: Calm and cooperative at times, redirectable. Med change per psychiatry.

## 2021-05-27 NOTE — PLAN OF CARE
Cognitive Concerns/ Orientation : Pt A/Ox1, oriented to self only.    BEHAVIOR & AGGRESSION TOOL COLOR: Green  ABNL VS/O2: VSS on RA  MOBILITY: Independent in room  PAIN MANAGMENT: Denies  DIET: Regular  BOWEL/BLADDER: Continent  DRAIN/DEVICES: No IV access  SKIN: Redness behind ear  D/C DATE: Discharge pending guardianship and placement. Civil commitment through [facility] until 7/31/21  Discharge Barriers: Guardianship  OTHER IMPORTANT INFO: Calm and cooperative at times, redirectable. Med change per psychiatry.

## 2021-05-28 PROCEDURE — 99232 SBSQ HOSP IP/OBS MODERATE 35: CPT | Performed by: PSYCHIATRY & NEUROLOGY

## 2021-05-28 PROCEDURE — 250N000013 HC RX MED GY IP 250 OP 250 PS 637: Performed by: HOSPITALIST

## 2021-05-28 PROCEDURE — 99231 SBSQ HOSP IP/OBS SF/LOW 25: CPT | Performed by: INTERNAL MEDICINE

## 2021-05-28 PROCEDURE — 250N000013 HC RX MED GY IP 250 OP 250 PS 637: Performed by: PSYCHIATRY & NEUROLOGY

## 2021-05-28 PROCEDURE — 120N000001 HC R&B MED SURG/OB

## 2021-05-28 PROCEDURE — 250N000013 HC RX MED GY IP 250 OP 250 PS 637: Performed by: INTERNAL MEDICINE

## 2021-05-28 RX ADMIN — RISPERIDONE 1 MG: 1 TABLET, ORALLY DISINTEGRATING ORAL at 11:35

## 2021-05-28 RX ADMIN — MELATONIN TAB 3 MG 3 MG: 3 TAB at 21:05

## 2021-05-28 RX ADMIN — HYDROCORTISONE: 10 CREAM TOPICAL at 21:06

## 2021-05-28 RX ADMIN — HALOPERIDOL 10 MG: 5 TABLET ORAL at 21:04

## 2021-05-28 RX ADMIN — BENZTROPINE MESYLATE 1 MG: 0.5 TABLET ORAL at 08:12

## 2021-05-28 RX ADMIN — SALMETEROL XINAFOATE 1 PUFF: 50 POWDER, METERED ORAL; RESPIRATORY (INHALATION) at 08:19

## 2021-05-28 RX ADMIN — RISPERIDONE 1.5 MG: 0.5 TABLET, ORALLY DISINTEGRATING ORAL at 21:04

## 2021-05-28 RX ADMIN — BENZTROPINE MESYLATE 1 MG: 0.5 TABLET ORAL at 21:05

## 2021-05-28 RX ADMIN — HALOPERIDOL 10 MG: 5 TABLET ORAL at 13:56

## 2021-05-28 RX ADMIN — HALOPERIDOL 10 MG: 5 TABLET ORAL at 08:12

## 2021-05-28 RX ADMIN — ATORVASTATIN CALCIUM 80 MG: 40 TABLET, FILM COATED ORAL at 21:05

## 2021-05-28 RX ADMIN — SALMETEROL XINAFOATE 1 PUFF: 50 POWDER, METERED ORAL; RESPIRATORY (INHALATION) at 21:06

## 2021-05-28 RX ADMIN — LEVOTHYROXINE SODIUM 88 MCG: 88 TABLET ORAL at 08:12

## 2021-05-28 RX ADMIN — DIVALPROEX SODIUM 1000 MG: 500 TABLET, FILM COATED, EXTENDED RELEASE ORAL at 21:05

## 2021-05-28 RX ADMIN — MEMANTINE 5 MG: 5 TABLET ORAL at 08:12

## 2021-05-28 RX ADMIN — RISPERIDONE 1 MG: 1 TABLET, ORALLY DISINTEGRATING ORAL at 08:12

## 2021-05-28 RX ADMIN — NICOTINE POLACRILEX 2 MG: 2 LOZENGE ORAL at 22:37

## 2021-05-28 RX ADMIN — Medication 800 UNITS: at 08:12

## 2021-05-28 RX ADMIN — ASPIRIN 81 MG: 81 TABLET, COATED ORAL at 08:12

## 2021-05-28 ASSESSMENT — ACTIVITIES OF DAILY LIVING (ADL)
ADLS_ACUITY_SCORE: 12
ADLS_ACUITY_SCORE: 13
ADLS_ACUITY_SCORE: 12
ADLS_ACUITY_SCORE: 12

## 2021-05-28 NOTE — PLAN OF CARE
DATE & TIME: 5/28/2021 6050-0171  Cognitive Concerns/ Orientation : Oriented to self only.    BEHAVIOR & AGGRESSION TOOL COLOR: Green, can be yellow at times with agitation.   ABNL VS/O2: VSS on RA  MOBILITY: Independent in room, ambulated in halls with sitter  PAIN MANAGMENT: Denies  DIET: Regular- good appetite  BOWEL/BLADDER: Continent  DRAIN/DEVICES: No IV access  SKIN: Redness behind ear, denies itching  D/C DATE: Discharge pending guardianship and placement, however now psych is pursing transfer to inpatient psych on a locked unit. Civil commitment through [facility] until 7/31/21  OTHER IMPORTANT INFO: Calm and cooperative at times, redirectable. Med changes per psychiatry today. Given PRN Risperdal x1

## 2021-05-28 NOTE — CONSULTS
"[facility] Psychiatric Consult Progress Note    Interval History:   I met with the patient on station [id number].  Routine follow-up was requested with respect to his psychiatric medications, and ongoing concern that he gets intermittently agitated requiring PRN medications at times and while we are awaiting possible [facility]-psych placement. On review of the chart he did have another episode of agitation yesterday afternoon. He was confused, psychotic, paranoid, agitated. Willingly took a Risperidone M-tab and calmed down nicely it appears. He is now taking scheduled Mtab 1 mg BID and has PRN available for breakthrough agitation. On interview this morning he reports he is feeling sad/depressed, due to beingin the hospital. Believes he has been here \"a few months,\" (has been here since September). He asks if I can send him home today. When told about the commimttment he seems unsaware of this but accepting that we cannot send him home today. He is able to stay calm right now. Denies SI/HI. Acknowledges voices are occurring, and becomes tearful when asked what they are saying. He cannot describe it in a way I can understand. He speaks softely at times, mumbling. He denies they are telling him to harm self or others. Reports good appetite. Reports sleep is difficult. On questioning he says he is in [facility]), and year is 2019. He says it is September.            Review of systems:   10 point Review of Systems completed by Lio Murrieta MD , and is  is negative other than noted in the HPI     Medications:       aspirin  81 mg Oral Daily     atorvastatin  80 mg Oral At Bedtime     benztropine  1 mg Oral BID     divalproex sodium extended-release  1,000 mg Oral At Bedtime     docusate sodium  100 mg Oral BID     haloperidol  10 mg Oral TID     hydrocortisone   Topical BID     levothyroxine  88 mcg Oral Daily     memantine  5 mg Oral Daily     risperiDONE  1 mg Sublingual BID     " "salmeterol  1 puff Inhalation BID     vitamin E  800 Units Oral Daily     acetaminophen, albuterol, alum & mag hydroxide-simethicone, haloperidol lactate, LORazepam, melatonin, naloxone **OR** naloxone **OR** naloxone **OR** naloxone, nicotine, nicotine, ondansetron **OR** ondansetron, polyethylene glycol, polyethylene glycol-propylene glycol, risperiDONE    Mental Status Examination:   Appearance: He appears standing upright. No agitation. Calm and cooperative  Eye Contact: He seems to stare blankly at times  , fair at times   speech:  Impoverished, dysarthric, monotone, speaks in a whisper  language: Poor  Psychomotor Behavior:  some buccal-lingual dyskinesia, chewing movements  Mood:  depressed/sad  affect: Blunted   thought Process:  paucity of speech, no obvious loosening of associations flight of ideas or formal thought disorder  Thought Content:  no evidence of suicidal ideation or homicidal ideation, but at times he does tend to stare off into space, looks a guarded, but pleasant  Oriented to: Person only, he thought he was at the [facility] stating \"I am at [facility]\"  Attention Span and Concentration:  poor  Recent and Remote Memory:  limited  Fund of Knowledge: delayed  Insight:  limited  Judgment:  limited   Denies SI/HI      Labs/Vitals:     No results found for this or any previous visit (from the past 24 hour(s)).  B/P: 123/95, T: 97.6, P: 89, R: 18  Impression  John is a 55-year-old gentleman with a known major neurocognitive disorder and schizophrenic spectrum illness.  He has a very challenging case.   Still with marked cognitive impairment. Will add on some lab work for completeness through likely related to chronic mental illness and prior alcohol abuse.     DIagnoses:   1.  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   2.  Other schizophrenia spectrum disorder.   3.  Alcohol use disorder in remission.   4.  Stimulant use " disorder, in remission.   5.  Chronic obstructive pulmonary disease.   6.  Hypertension.   7. R/O mild TD vs EPSE       Plan:   1.  Effexor has been discontinued  2.  Continue Depakote ER at 1000 mg HS. Level 50. Likely can raise up to 1250 mg HS but want to check an ammonia first to rule out hyperammonemia associated with Depakote given continued pronounced confusion. Once this is confirmed as normal can add a 250 mg dose in the AM in addition to the 1000 mg HS.   3.  Continue with door alarm, he is under commitment, it looks like they are pursuing guardianship  4.   Seems like he might be responding better Risperdal M tab but still with some breakthrough agitation, will increase to 1.5 mg BID (cautious dose increase as he is already on max dose Haldol as well)  5. Rec psychiatry transfer, trying to manage him in this setting has been extremely difficult, and he would benefit from closer supervision in a locked setting.  The only other consideration would be geriatric psychiatry, which may be a difficult disposition to an outside facility given the circumstances.    6. Check Ammonia, folic acid, B12, Vitamin D, TSH (has not been checked since Sept) for ongoing cognitive impairment for completeness.   Attestation:  Patient has been seen and evaluated by me,  Lio Murrieta MD    Visit/Communication Style   In person, face-to-face

## 2021-05-28 NOTE — PROGRESS NOTES
[facility]    Hospitalist Progress Note    Assessment & Plan   55 year old male with PMHx of schizophrenia, hx of TBI, alcohol use disorder, COPD, hyperlipidemia and hypothyroidism who was admitted on 9/9/2020 for evaluation of aggressive behavior at his group home.    Was evaluated by Psychiatry and his condition stabilized, though his group home was unwilling to take him back and thus hospitalization has been prolonged awaiting new placement and guardianship. Under civil commitment through [facility].   Noted increasingly agitated behavior and attempts at elopement in May 2021.     Major neurocognitive disorder dt hx of TBI and alcohol use disorder  Schizophrenia  Under commitment    -- Psych now recommending Geriatric Psych unit because of increasing agitation, no facility available yet.     Chronic/Stable/Resolved  Possible Tardive Dyskinesia vs EPSE  Per psych assessment on 4/1/21, noted to have possible tardive dyskinesia vs extrapyramidal side effects of meds. At that time, recommended to increase Cogentin to 1mg BID and add vitamin E 800 international unit(s) daily.      Back cellulitis in 1/2021: Resolved  Noted on 1/24/21, initiated on cephalexin at that time. Received local wound care and completed 7-day course of antibiotics on 1/30.     Baseline Hypotension: BP 90-110s systolic on average. Asymptomatic. No concerns     Hyperlipidemia: Chronic and stable on llow dose ASA and  atorvastatin     COPD: Not O2 dependent. Chronic and stable on salmeterol, albuterol prn     Hypothyroidism: Chronic and stable on levothyroxine     Tobacco use disorder: Using nicotine patch and PRN gum     Hx of infective endocarditis with severe mitral and aortic regurgitation S/P bioprosthetic valve replacement (10/2015)  Systolic heart murmur  HFrEF, not in acute exacerbation: Follows with Dr. Dockery of [facility] V[facility], last seen in 1/2020. Note heart murmur on exam, strongest in  aortic region. No CP, SOB, dizziness, syncope. Echocardiogram in 5/2016 with EF 50%, no evidence for prosthetic mitral and aortic valve dysfunction.   - Follow-up with outpatient Cardiology upon dismissal from the hospital (on every 2 year follow-ups)   - Continue ASA      Hx of CVA: Note basal ganglia stroke in 2015. No focal neuro deficits aside from cognitive dysfunction as above.     Non-severe malnutrition    DVT Prophylaxis: Patient is ambulatory  Code Status: Full Code  PT/OT: not needed  Diet: Room Service  Diet  Combination Diet Regular Diet Adult; Safe Tray - with utensils  Snacks/Supplements Adult: Other; Bedtime snack; Between Meals  Room Service      Disposition: Expected discharge when appropriate facility available    Deuce Turner MD  Text Page  (7am to 6pm)  Subjective -- no issues today    Physical Exam   Temp: 97.3  F (36.3  C) Temp src: Oral BP: 118/82 Pulse: 71   Resp: 16 SpO2: 97 % O2 Device: None (Room air)    Vitals:    12/11/20 0700 03/19/21 2120 03/31/21 1700   Weight: 82.6 kg (182 lb) 84.4 kg (186 lb) 84.2 kg (185 lb 9.6 oz)     Vital Signs with Ranges  Temp:  [97.3  F (36.3  C)] 97.3  F (36.3  C)  Pulse:  [71] 71  Resp:  [16] 16  BP: (118)/(82) 118/82  SpO2:  [97 %] 97 %  I/O last 3 completed shifts:  In: 320 [P.O.:320]  Out: -   O2 requirements: none    Constitutional: Male in NAD, somnolent  Cardiovascular: RRR, normal S1/2, no m/r/g  Respiratory: CTAB, no wheezing or crackles  Vascular: No LE pitting edema  GI: Normoactive bowel sounds, nontender, nondistended  Skin/Integumen: No rashes  Neuro/Psych: Sleepy, flat affect, no focal deficits     Medications       aspirin  81 mg Oral Daily     atorvastatin  80 mg Oral At Bedtime     benztropine  1 mg Oral BID     divalproex sodium extended-release  1,000 mg Oral At Bedtime     docusate sodium  100 mg Oral BID     haloperidol  10 mg Oral TID     hydrocortisone   Topical BID     levothyroxine  88 mcg Oral Daily     memantine  5 mg  Oral Daily     risperiDONE  1 mg Sublingual BID     salmeterol  1 puff Inhalation BID     vitamin E  800 Units Oral Daily       Data   No lab results found in last 7 days.    Imaging:   No results found for this or any previous visit (from the past 24 hour(s)).

## 2021-05-28 NOTE — PLAN OF CARE
Cognitive Concerns/ Orientation : Pt A/Ox1, oriented to self only.    BEHAVIOR & AGGRESSION TOOL COLOR: Green,can be yellow at times  ABNL VS/O2: VSS on RA  MOBILITY: Independent in room  PAIN MANAGMENT: Denies  DIET: Regular  BOWEL/BLADDER: Continent  DRAIN/DEVICES: No IV access  SKIN: Redness behind ear  D/C DATE: Discharge pending guardianship and placement. Civil commitment through [facility] until 7/31/21  Discharge Barriers: Guardianship  OTHER IMPORTANT INFO: Calm and cooperative at times, redirectable. Med change per psychiatry.

## 2021-05-28 NOTE — PROGRESS NOTES
[facility]    Hospitalist Progress Note    Assessment & Plan   55 year old male with PMHx of schizophrenia, hx of TBI, alcohol use disorder, COPD, hyperlipidemia and hypothyroidism who was admitted on 9/9/2020 for evaluation of aggressive behavior at his group home.    Was evaluated by Psychiatry and his condition stabilized, though his group home was unwilling to take him back and thus hospitalization has been prolonged awaiting new placement and guardianship. Under civil commitment through [facility].   Noted increasingly agitated behavior and attempts at elopement in May 2021.     Major neurocognitive disorder dt hx of TBI and alcohol use disorder  Schizophrenia  Under commitment    -- Psych now recommending Geriatric Psych unit because of increasing agitation, no facility available yet.     Chronic/Stable/Resolved  Possible Tardive Dyskinesia vs EPSE  Per psych assessment on 4/1/21, noted to have possible tardive dyskinesia vs extrapyramidal side effects of meds. At that time, recommended to increase Cogentin to 1mg BID and add vitamin E 800 international unit(s) daily.      Back cellulitis in 1/2021: Resolved  Noted on 1/24/21, initiated on cephalexin at that time. Received local wound care and completed 7-day course of antibiotics on 1/30.     Baseline Hypotension: BP 90-110s systolic on average. Asymptomatic. No concerns     Hyperlipidemia: Chronic and stable on llow dose ASA and  atorvastatin     COPD: Not O2 dependent. Chronic and stable on salmeterol, albuterol prn     Hypothyroidism: Chronic and stable on levothyroxine     Tobacco use disorder: Using nicotine patch and PRN gum     Hx of infective endocarditis with severe mitral and aortic regurgitation S/P bioprosthetic valve replacement (10/2015)  Systolic heart murmur  HFrEF, not in acute exacerbation: Follows with Dr. Dockery of Heart and Vascular Cardiology, last seen in 1/2020. Note heart murmur on exam, strongest in  aortic region. No CP, SOB, dizziness, syncope. Echocardiogram in 5/2016 with EF 50%, no evidence for prosthetic mitral and aortic valve dysfunction.   - Follow-up with outpatient Cardiology upon dismissal from the hospital (on every 2 year follow-ups)   - Continue ASA      Hx of CVA: Note basal ganglia stroke in 2015. No focal neuro deficits aside from cognitive dysfunction as above.     Non-severe malnutrition    DVT Prophylaxis: Patient is ambulatory  Code Status: Full Code  PT/OT: not needed  Diet: Room Service  Diet  Combination Diet Regular Diet Adult; Safe Tray - with utensils  Snacks/Supplements Adult: Other; Bedtime snack; Between Meals  Room Service      Disposition: Expected discharge when appropriate facility available    Deuce Turner MD  Text Page  (7am to 6pm)  Subjective -- no issues today    Physical Exam   Temp: 97.5  F (36.4  C) Temp src: Oral BP: 103/70 Pulse: 81   Resp: 16 SpO2: 96 % O2 Device: None (Room air)    Vitals:    12/11/20 0700 03/19/21 2120 03/31/21 1700   Weight: 82.6 kg (182 lb) 84.4 kg (186 lb) 84.2 kg (185 lb 9.6 oz)     Vital Signs with Ranges  Temp:  [97.5  F (36.4  C)] 97.5  F (36.4  C)  Pulse:  [81] 81  Resp:  [16] 16  BP: (103)/(70) 103/70  SpO2:  [96 %] 96 %  I/O last 3 completed shifts:  In: 470 [P.O.:470]  Out: -   O2 requirements: none    Constitutional: Male in NAD, somnolent  Cardiovascular: RRR, normal S1/2, no m/r/g  Respiratory: CTAB, no wheezing or crackles  Vascular: No LE pitting edema  GI: Normoactive bowel sounds, nontender, nondistended  Skin/Integumen: No rashes  Neuro/Psych: Sleepy, flat affect, no focal deficits     Medications       aspirin  81 mg Oral Daily     atorvastatin  80 mg Oral At Bedtime     benztropine  1 mg Oral BID     divalproex sodium extended-release  1,000 mg Oral At Bedtime     docusate sodium  100 mg Oral BID     haloperidol  10 mg Oral TID     hydrocortisone   Topical BID     levothyroxine  88 mcg Oral Daily     memantine  5 mg  Oral Daily     risperiDONE  1.5 mg Sublingual BID     salmeterol  1 puff Inhalation BID     vitamin E  800 Units Oral Daily       Data   No lab results found in last 7 days.    Imaging:   No results found for this or any previous visit (from the past 24 hour(s)).

## 2021-05-29 LAB
AMMONIA PLAS-SCNC: 51 UMOL/L (ref 10–50)
FOLATE SERPL-MCNC: 18.6 NG/ML
TSH SERPL DL<=0.005 MIU/L-ACNC: 5.18 MU/L (ref 0.4–4)
VIT B12 SERPL-MCNC: 642 PG/ML (ref 193–986)

## 2021-05-29 PROCEDURE — 84443 ASSAY THYROID STIM HORMONE: CPT | Performed by: PSYCHIATRY & NEUROLOGY

## 2021-05-29 PROCEDURE — 36415 COLL VENOUS BLD VENIPUNCTURE: CPT | Performed by: PSYCHIATRY & NEUROLOGY

## 2021-05-29 PROCEDURE — 120N000001 HC R&B MED SURG/OB

## 2021-05-29 PROCEDURE — 82746 ASSAY OF FOLIC ACID SERUM: CPT | Performed by: PSYCHIATRY & NEUROLOGY

## 2021-05-29 PROCEDURE — 82306 VITAMIN D 25 HYDROXY: CPT | Performed by: PSYCHIATRY & NEUROLOGY

## 2021-05-29 PROCEDURE — 250N000013 HC RX MED GY IP 250 OP 250 PS 637: Performed by: INTERNAL MEDICINE

## 2021-05-29 PROCEDURE — 250N000013 HC RX MED GY IP 250 OP 250 PS 637: Performed by: PSYCHIATRY & NEUROLOGY

## 2021-05-29 PROCEDURE — 99231 SBSQ HOSP IP/OBS SF/LOW 25: CPT | Performed by: INTERNAL MEDICINE

## 2021-05-29 PROCEDURE — 82140 ASSAY OF AMMONIA: CPT | Performed by: PSYCHIATRY & NEUROLOGY

## 2021-05-29 PROCEDURE — 82607 VITAMIN B-12: CPT | Performed by: PSYCHIATRY & NEUROLOGY

## 2021-05-29 RX ADMIN — Medication 800 UNITS: at 08:13

## 2021-05-29 RX ADMIN — HALOPERIDOL 10 MG: 5 TABLET ORAL at 08:13

## 2021-05-29 RX ADMIN — DIVALPROEX SODIUM 1000 MG: 500 TABLET, FILM COATED, EXTENDED RELEASE ORAL at 21:38

## 2021-05-29 RX ADMIN — RISPERIDONE 1.5 MG: 0.5 TABLET, ORALLY DISINTEGRATING ORAL at 08:14

## 2021-05-29 RX ADMIN — HYDROCORTISONE: 10 CREAM TOPICAL at 21:40

## 2021-05-29 RX ADMIN — BENZTROPINE MESYLATE 1 MG: 0.5 TABLET ORAL at 08:13

## 2021-05-29 RX ADMIN — HALOPERIDOL 10 MG: 5 TABLET ORAL at 12:46

## 2021-05-29 RX ADMIN — DOCUSATE SODIUM 100 MG: 100 CAPSULE, LIQUID FILLED ORAL at 21:36

## 2021-05-29 RX ADMIN — DOCUSATE SODIUM 100 MG: 100 CAPSULE, LIQUID FILLED ORAL at 08:13

## 2021-05-29 RX ADMIN — SALMETEROL XINAFOATE 1 PUFF: 50 POWDER, METERED ORAL; RESPIRATORY (INHALATION) at 21:40

## 2021-05-29 RX ADMIN — ASPIRIN 81 MG: 81 TABLET, COATED ORAL at 08:13

## 2021-05-29 RX ADMIN — SALMETEROL XINAFOATE 1 PUFF: 50 POWDER, METERED ORAL; RESPIRATORY (INHALATION) at 08:14

## 2021-05-29 RX ADMIN — HALOPERIDOL 10 MG: 5 TABLET ORAL at 21:35

## 2021-05-29 RX ADMIN — HYDROCORTISONE: 10 CREAM TOPICAL at 08:14

## 2021-05-29 RX ADMIN — LEVOTHYROXINE SODIUM 88 MCG: 88 TABLET ORAL at 08:14

## 2021-05-29 RX ADMIN — MEMANTINE 5 MG: 5 TABLET ORAL at 08:13

## 2021-05-29 RX ADMIN — RISPERIDONE 1.5 MG: 0.5 TABLET, ORALLY DISINTEGRATING ORAL at 21:33

## 2021-05-29 RX ADMIN — BENZTROPINE MESYLATE 1 MG: 0.5 TABLET ORAL at 21:38

## 2021-05-29 RX ADMIN — ATORVASTATIN CALCIUM 80 MG: 40 TABLET, FILM COATED ORAL at 21:37

## 2021-05-29 ASSESSMENT — ACTIVITIES OF DAILY LIVING (ADL)
ADLS_ACUITY_SCORE: 13

## 2021-05-29 NOTE — PLAN OF CARE
DATE & TIME: 5/28/2021 [id number]  Cognitive Concerns/ Orientation : Oriented to self only.    BEHAVIOR & AGGRESSION TOOL COLOR: Green, can be yellow at times with agitation.   ABNL VS/O2: VSS on RA  MOBILITY: Independent in room, ambulated in halls with sitter  PAIN MANAGMENT: Denies  DIET: Regular- good appetite  BOWEL/BLADDER: Continent  DRAIN/DEVICES: No IV access  SKIN: Redness behind ear, denies itching  D/C DATE: Discharge pending guardianship and placement, however now psych is pursing transfer to inpatient psych on a locked unit. Civil commitment through [facility] until 7/31/21  OTHER IMPORTANT INFO: Calm and cooperative at times, redirectable. Med changes per psychiatry today.

## 2021-05-29 NOTE — PLAN OF CARE
DATE & TIME: 5/29/2021 4263-1555  Cognitive Concerns/ Orientation : Oriented to self only.    BEHAVIOR & AGGRESSION TOOL COLOR: Green, can be yellow at times with agitation.   ABNL VS/O2: VSS on RA  MOBILITY: Independent in room, ambulated in halls with sitter  PAIN MANAGMENT: Denies  DIET: Regular- good appetite  BOWEL/BLADDER: Continent  DRAIN/DEVICES: No IV access  SKIN: Redness behind ear, denies itching  D/C DATE: Discharge pending guardianship and placement, however now psych is pursing transfer to inpatient psych on a locked unit. Civil commitment through [facility] until 7/31/21  OTHER IMPORTANT INFO: Agitated and restless at times, redirectable, worse in evening. Psych following.

## 2021-05-29 NOTE — PROGRESS NOTES
[facility]    Hospitalist Progress Note    Assessment & Plan   55 year old male with PMHx of schizophrenia, hx of TBI, alcohol use disorder, COPD, hyperlipidemia and hypothyroidism who was admitted on 9/9/2020 for evaluation of aggressive behavior at his group home.    Was evaluated by Psychiatry and his condition stabilized, though his group home was unwilling to take him back and thus hospitalization has been prolonged awaiting new placement and guardianship. Under civil commitment through [facility].   Noted increasingly agitated behavior and attempts at elopement in May 2021.     Major neurocognitive disorder dt hx of TBI and alcohol use disorder  Schizophrenia  Under commitment    -- Psych now recommending Geriatric Psych unit because of increasing agitation, no facility available yet.   Possible Tardive Dyskinesia vs EPSE  Per psych assessment on 4/1/21, noted to have possible tardive dyskinesia vs extrapyramidal side effects of meds. At that time, recommended to increase Cogentin to 1mg BID and add vitamin E 800 international unit(s) daily.      Back cellulitis in 1/2021: Resolved  Noted on 1/24/21, initiated on cephalexin at that time. Received local wound care and completed 7-day course of antibiotics on 1/30.     Baseline Hypotension: BP 90-110s systolic on average. Asymptomatic. No concerns     Hyperlipidemia: Chronic and stable on llow dose ASA and  atorvastatin     COPD: Not O2 dependent. Chronic and stable on salmeterol, albuterol prn     Hypothyroidism: Chronic and stable on levothyroxine     Tobacco use disorder: Using nicotine patch and PRN gum     Hx of infective endocarditis with severe mitral and aortic regurgitation S/P bioprosthetic valve replacement (10/2015)  Systolic heart murmur  HFrEF, not in acute exacerbation: Follows with Dr. Dockery of [facility] V[facility], last seen in 1/2020. Echocardiogram in 5/2016 with EF 50%, no evidence for prosthetic  mitral and aortic valve dysfunction.   - Follow-up with outpatient Cardiology upon dismissal from the hospital (on every 2 year follow-ups)   - Continue ASA      Hx of CVA: Note basal ganglia stroke in 2015. No focal neuro deficits aside from cognitive dysfunction as above.     Non-severe malnutrition    DVT Prophylaxis: Patient is ambulatory  Code Status: Full Code  PT/OT: not needed  Diet: Room Service  Diet  Combination Diet Regular Diet Adult; Safe Tray - with utensils  Snacks/Supplements Adult: Other; Bedtime snack; Between Meals  Room Service      Disposition: Expected discharge when appropriate facility available    Deuce Turner MD  Text Page  (7am to 6pm)  Subjective -- no issues today    Physical Exam   Temp: 97.3  F (36.3  C) Temp src: Oral BP: 114/82 Pulse: 89   Resp: 18 SpO2: 98 % O2 Device: None (Room air)    Vitals:    12/11/20 0700 03/19/21 2120 03/31/21 1700   Weight: 82.6 kg (182 lb) 84.4 kg (186 lb) 84.2 kg (185 lb 9.6 oz)     Vital Signs with Ranges  Temp:  [97.3  F (36.3  C)] 97.3  F (36.3  C)  Pulse:  [89] 89  Resp:  [18] 18  BP: (114)/(82) 114/82  SpO2:  [98 %] 98 %  I/O last 3 completed shifts:  In: 720 [P.O.:720]  Out: -   O2 requirements: none    Cardiovascular: RRR, normal S1/2, with 2/6 sytolic murmur at right upper sternal border   Respiratory: CTAB, no wheezing or crackles  Vascular: No ankle edema  GI: Normoactive bowel sounds, nontender, nondistended  Neuro/Psych: alert, flat affect, no focal deficits     Medications       aspirin  81 mg Oral Daily     atorvastatin  80 mg Oral At Bedtime     benztropine  1 mg Oral BID     divalproex sodium extended-release  1,000 mg Oral At Bedtime     docusate sodium  100 mg Oral BID     haloperidol  10 mg Oral TID     hydrocortisone   Topical BID     levothyroxine  88 mcg Oral Daily     memantine  5 mg Oral Daily     risperiDONE  1.5 mg Sublingual BID     salmeterol  1 puff Inhalation BID     vitamin E  800 Units Oral Daily       Data   No  lab results found in last 7 days.    Imaging:   No results found for this or any previous visit (from the past 24 hour(s)).

## 2021-05-30 LAB
CREAT SERPL-MCNC: 0.81 MG/DL (ref 0.66–1.25)
DEPRECATED CALCIDIOL+CALCIFEROL SERPL-MC: 16 UG/L (ref 20–75)
GFR SERPL CREATININE-BSD FRML MDRD: >90 ML/MIN/{1.73_M2}

## 2021-05-30 PROCEDURE — 250N000013 HC RX MED GY IP 250 OP 250 PS 637: Performed by: PSYCHIATRY & NEUROLOGY

## 2021-05-30 PROCEDURE — 36415 COLL VENOUS BLD VENIPUNCTURE: CPT | Performed by: INTERNAL MEDICINE

## 2021-05-30 PROCEDURE — 99231 SBSQ HOSP IP/OBS SF/LOW 25: CPT | Performed by: INTERNAL MEDICINE

## 2021-05-30 PROCEDURE — 250N000013 HC RX MED GY IP 250 OP 250 PS 637: Performed by: INTERNAL MEDICINE

## 2021-05-30 PROCEDURE — 250N000013 HC RX MED GY IP 250 OP 250 PS 637: Performed by: HOSPITALIST

## 2021-05-30 PROCEDURE — 120N000001 HC R&B MED SURG/OB

## 2021-05-30 PROCEDURE — 82565 ASSAY OF CREATININE: CPT | Performed by: INTERNAL MEDICINE

## 2021-05-30 RX ADMIN — BENZTROPINE MESYLATE 1 MG: 0.5 TABLET ORAL at 21:37

## 2021-05-30 RX ADMIN — RISPERIDONE 1.5 MG: 0.5 TABLET, ORALLY DISINTEGRATING ORAL at 08:30

## 2021-05-30 RX ADMIN — BENZTROPINE MESYLATE 1 MG: 0.5 TABLET ORAL at 08:31

## 2021-05-30 RX ADMIN — Medication 800 UNITS: at 08:31

## 2021-05-30 RX ADMIN — SALMETEROL XINAFOATE 1 PUFF: 50 POWDER, METERED ORAL; RESPIRATORY (INHALATION) at 21:44

## 2021-05-30 RX ADMIN — MEMANTINE 5 MG: 5 TABLET ORAL at 08:31

## 2021-05-30 RX ADMIN — RISPERIDONE 1.5 MG: 0.5 TABLET, ORALLY DISINTEGRATING ORAL at 21:42

## 2021-05-30 RX ADMIN — HALOPERIDOL 10 MG: 5 TABLET ORAL at 13:16

## 2021-05-30 RX ADMIN — HALOPERIDOL 10 MG: 5 TABLET ORAL at 08:31

## 2021-05-30 RX ADMIN — ATORVASTATIN CALCIUM 80 MG: 40 TABLET, FILM COATED ORAL at 21:39

## 2021-05-30 RX ADMIN — LEVOTHYROXINE SODIUM 88 MCG: 88 TABLET ORAL at 08:31

## 2021-05-30 RX ADMIN — HALOPERIDOL 10 MG: 5 TABLET ORAL at 21:39

## 2021-05-30 RX ADMIN — ASPIRIN 81 MG: 81 TABLET, COATED ORAL at 08:31

## 2021-05-30 RX ADMIN — DIVALPROEX SODIUM 1000 MG: 500 TABLET, FILM COATED, EXTENDED RELEASE ORAL at 21:42

## 2021-05-30 RX ADMIN — MELATONIN TAB 3 MG 3 MG: 3 TAB at 04:18

## 2021-05-30 ASSESSMENT — ACTIVITIES OF DAILY LIVING (ADL)
ADLS_ACUITY_SCORE: 13

## 2021-05-30 NOTE — PLAN OF CARE
DATE & TIME: 5/29/2021 3019-4144  Cognitive Concerns/ Orientation : Oriented to self only.    BEHAVIOR & AGGRESSION TOOL COLOR: Green, can be yellow at times with agitation.   ABNL VS/O2: VSS on RA  MOBILITY: Independent in room, ambulated in halls with sitter  PAIN MANAGMENT: Denies  DIET: Regular- good appetite  BOWEL/BLADDER: Continent  DRAIN/DEVICES: No IV access  SKIN: Redness behind ear, denies itching  D/C DATE: Discharge pending guardianship and placement, however now psych is pursing transfer to inpatient psych on a locked unit. Civil commitment through [facility] until 7/31/21  OTHER IMPORTANT INFO: Agitated and restless at times, redirectable, worse in evening. Psych following.

## 2021-05-30 NOTE — PLAN OF CARE
DATE & TIME: 5/30/2021 2667-2409  Cognitive Concerns/ Orientation : Oriented to self only.    BEHAVIOR & AGGRESSION TOOL COLOR: Green, can be yellow at times with agitation.   ABNL VS/O2: VSS on RA  MOBILITY: Independent in room, ambulated in halls with sitter  PAIN MANAGMENT: Denies  DIET: Regular- good appetite  BOWEL/BLADDER: Continent  DRAIN/DEVICES: No IV access  SKIN: Redness behind ear, denies itching  D/C DATE: Discharge pending guardianship and placement, however now psych is pursing transfer to inpatient psych on a locked unit. Civil commitment through [facility] until 7/31/21  OTHER IMPORTANT INFO: Agitated and restless at times, redirectable, worse in evening usually. No PRN's needed. Psych following.

## 2021-05-30 NOTE — PROGRESS NOTES
[facility]    Hospitalist Progress Note    Assessment & Plan   55 year old male with PMHx of schizophrenia, hx of TBI, alcohol use disorder, COPD, hyperlipidemia and hypothyroidism who was admitted on 9/9/2020 for evaluation of aggressive behavior at his group home.    Was evaluated by Psychiatry and his condition stabilized, though his group home was unwilling to take him back and thus hospitalization has been prolonged awaiting new placement and guardianship. Under civil commitment through [facility].   Noted increasingly agitated behavior and attempts at elopement in May 2021.     Major neurocognitive disorder dt hx of TBI and alcohol use disorder  Schizophrenia  Under commitment    -- Psych now recommending Geriatric Psych unit because of increasing agitation, no facility available yet.   Possible Tardive Dyskinesia vs EPSE  Per psych assessment on 4/1/21, noted to have possible tardive dyskinesia vs extrapyramidal side effects of meds. At that time, recommended to increase Cogentin to 1mg BID and add vitamin E 800 international unit(s) daily.      Back cellulitis in 1/2021: Resolved  Noted on 1/24/21, initiated on cephalexin at that time. Received local wound care and completed 7-day course of antibiotics on 1/30.     Baseline Hypotension: BP 90-110s systolic on average. Asymptomatic. No concerns     Hyperlipidemia: Chronic and stable on llow dose ASA and  atorvastatin     COPD: Not O2 dependent. Chronic and stable on salmeterol, albuterol prn     Hypothyroidism: Chronic and stable on levothyroxine     Tobacco use disorder: Using nicotine patch and PRN gum     Hx of infective endocarditis with severe mitral and aortic regurgitation S/P bioprosthetic valve replacement (10/2015)  Systolic heart murmur  HFrEF, not in acute exacerbation: Follows with Dr. Dockery of [facility] V[facility], last seen in 1/2020. Echocardiogram in 5/2016 with EF 50%, no evidence for prosthetic  mitral and aortic valve dysfunction.   - Follow-up with outpatient Cardiology upon dismissal from the hospital (on every 2 year follow-ups)   - Continue ASA      Hx of CVA: Note basal ganglia stroke in 2015. No focal neuro deficits aside from cognitive dysfunction as above.     Non-severe malnutrition    DVT Prophylaxis: Patient is ambulatory  Code Status: Full Code  PT/OT: not needed  Diet: Room Service  Diet  Combination Diet Regular Diet Adult; Safe Tray - with utensils  Snacks/Supplements Adult: Other; Bedtime snack; Between Meals  Room Service      Disposition: Expected discharge when appropriate facility available    Deuce Turner MD  Text Page  (7am to 6pm)  Subjective -- no issues today    Physical Exam   Temp: 97.5  F (36.4  C) Temp src: Oral BP: 109/77 Pulse: 81   Resp: 16 SpO2: 96 % O2 Device: None (Room air)    Vitals:    12/11/20 0700 03/19/21 2120 03/31/21 1700   Weight: 82.6 kg (182 lb) 84.4 kg (186 lb) 84.2 kg (185 lb 9.6 oz)     Vital Signs with Ranges  Temp:  [97.5  F (36.4  C)] 97.5  F (36.4  C)  Pulse:  [81] 81  Resp:  [16] 16  BP: (109)/(77) 109/77  SpO2:  [96 %] 96 %  I/O last 3 completed shifts:  In: 480 [P.O.:480]  Out: -   O2 requirements: none    Cardiovascular: RRR, normal S1/2, with 2/6 sytolic murmur at right upper sternal border   Respiratory: CTAB, no wheezing or crackles  Vascular: No ankle edema  GI: Normoactive bowel sounds, nontender, nondistended  Neuro/Psych: alert, flat affect, no focal deficits     Medications       aspirin  81 mg Oral Daily     atorvastatin  80 mg Oral At Bedtime     benztropine  1 mg Oral BID     divalproex sodium extended-release  1,000 mg Oral At Bedtime     docusate sodium  100 mg Oral BID     haloperidol  10 mg Oral TID     hydrocortisone   Topical BID     levothyroxine  88 mcg Oral Daily     memantine  5 mg Oral Daily     risperiDONE  1.5 mg Sublingual BID     salmeterol  1 puff Inhalation BID     vitamin E  800 Units Oral Daily       Data    Recent Labs   Lab 05/30/21  0822   CR 0.81       Imaging:   No results found for this or any previous visit (from the past 24 hour(s)).

## 2021-05-31 PROCEDURE — 250N000013 HC RX MED GY IP 250 OP 250 PS 637: Performed by: INTERNAL MEDICINE

## 2021-05-31 PROCEDURE — 120N000001 HC R&B MED SURG/OB

## 2021-05-31 PROCEDURE — 99231 SBSQ HOSP IP/OBS SF/LOW 25: CPT | Performed by: INTERNAL MEDICINE

## 2021-05-31 PROCEDURE — 250N000013 HC RX MED GY IP 250 OP 250 PS 637: Performed by: PSYCHIATRY & NEUROLOGY

## 2021-05-31 PROCEDURE — 87635 SARS-COV-2 COVID-19 AMP PRB: CPT | Performed by: INTERNAL MEDICINE

## 2021-05-31 RX ADMIN — DOCUSATE SODIUM 100 MG: 100 CAPSULE, LIQUID FILLED ORAL at 08:17

## 2021-05-31 RX ADMIN — BENZTROPINE MESYLATE 1 MG: 0.5 TABLET ORAL at 21:18

## 2021-05-31 RX ADMIN — RISPERIDONE 1 MG: 1 TABLET, ORALLY DISINTEGRATING ORAL at 18:13

## 2021-05-31 RX ADMIN — ATORVASTATIN CALCIUM 80 MG: 40 TABLET, FILM COATED ORAL at 21:18

## 2021-05-31 RX ADMIN — Medication 800 UNITS: at 08:17

## 2021-05-31 RX ADMIN — BENZTROPINE MESYLATE 1 MG: 0.5 TABLET ORAL at 08:17

## 2021-05-31 RX ADMIN — DOCUSATE SODIUM 100 MG: 100 CAPSULE, LIQUID FILLED ORAL at 21:18

## 2021-05-31 RX ADMIN — RISPERIDONE 1.5 MG: 0.5 TABLET, ORALLY DISINTEGRATING ORAL at 08:17

## 2021-05-31 RX ADMIN — HALOPERIDOL 10 MG: 5 TABLET ORAL at 21:18

## 2021-05-31 RX ADMIN — LEVOTHYROXINE SODIUM 88 MCG: 88 TABLET ORAL at 08:17

## 2021-05-31 RX ADMIN — RISPERIDONE 1.5 MG: 0.5 TABLET, ORALLY DISINTEGRATING ORAL at 21:24

## 2021-05-31 RX ADMIN — SALMETEROL XINAFOATE 1 PUFF: 50 POWDER, METERED ORAL; RESPIRATORY (INHALATION) at 08:18

## 2021-05-31 RX ADMIN — ASPIRIN 81 MG: 81 TABLET, COATED ORAL at 08:17

## 2021-05-31 RX ADMIN — HALOPERIDOL 10 MG: 5 TABLET ORAL at 13:57

## 2021-05-31 RX ADMIN — MEMANTINE 5 MG: 5 TABLET ORAL at 08:17

## 2021-05-31 RX ADMIN — SALMETEROL XINAFOATE 1 PUFF: 50 POWDER, METERED ORAL; RESPIRATORY (INHALATION) at 21:25

## 2021-05-31 RX ADMIN — DIVALPROEX SODIUM 1000 MG: 500 TABLET, FILM COATED, EXTENDED RELEASE ORAL at 21:18

## 2021-05-31 RX ADMIN — HALOPERIDOL 10 MG: 5 TABLET ORAL at 08:17

## 2021-05-31 ASSESSMENT — ACTIVITIES OF DAILY LIVING (ADL)
ADLS_ACUITY_SCORE: 13

## 2021-05-31 NOTE — PLAN OF CARE
DATE & TIME: 5/30/2021 8952-2075  Cognitive Concerns/ Orientation : Oriented to self only.    BEHAVIOR & AGGRESSION TOOL COLOR: Green, can be yellow at times with agitation.   ABNL VS/O2: VSS on RA  MOBILITY: Independent in room, ambulated in halls with sitter  PAIN MANAGMENT: Denies  DIET: Regular- good appetite  BOWEL/BLADDER: Continent  DRAIN/DEVICES: No IV access  SKIN: Redness behind ear, denies itching, refused hydrocortisone  D/C DATE: Discharge pending guardianship and placement, however now psych is pursing transfer to inpatient psych on a locked unit. Civil commitment through [facility] until 7/31/21  OTHER IMPORTANT INFO: Agitated and restless at times, redirectable, worse in evening usually. No PRN's needed. Psych following.

## 2021-05-31 NOTE — PLAN OF CARE
DATE & TIME: 5/31/2021   Cognitive Concerns/ Orientation : Oriented to self only, redirectable this shift.  BEHAVIOR & AGGRESSION TOOL COLOR: Yellow r/t hx: can be with agitation.   ABNL VS/O2: VSS on RA  MOBILITY: Ind in room, amb in halls with sitter.  PAIN MANAGMENT: Denies  DIET: Regular- Good appetite  BOWEL/BLADDER: Continent  DRAIN/DEVICES: No IV access.  SKIN: No redness behind L ear refused sched hydrocortisone.  D/C DATE: Discharge pending guardianship and placement, however now psych is pursing transfer to inpatient [facility] on a locked unit. Civil commitment through [facility] until 7/31/21  OTHER IMPORTANT INFO: Cont to become agitated with auditory hallucinations. No PRN's needed. Door alarm on, sitter available for distraction and redirecting needs.

## 2021-05-31 NOTE — PROGRESS NOTES
"[facility]    Hospitalist Progress Note    Interval History   No acute events yesterday. Calm and cooperative today.    Assessment & Plan   Summary: John Juárez is a 55 year old male with PMHx of schizophrenia, hx of TBI, alcohol use disorder, COPD, hyperlipidemia and hypothyroidism who was admitted on 9/9/2020 for evaluation of aggressive behavior at his group home.    Was evaluated by Psychiatry and his condition stabilized, though his group home was unwilling to take him back and thus hospitalization has been prolonged awaiting new placement and guardianship. Under civil commitment through [facility].   Noted increasingly agitated behavior and attempts at elopement in May 2021.     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  Brought to hospital for evaluation of aggressive behaviors. Group home now unwilling to take him back. Has had numerous CODE 21s called this stay. Seen by psych, meds adjusted. Is currently under civil commitment and court hold through [facility] until 7/31/21.  - Appreciate Psychiatry involvement   - Haldol reduced ot 5 mg tid 5/17, increase to 10 mg TID again 5/23, because of increasing agitation   - Benztropine 1mg BID    - Zyprexa 10mg BID   - Valproic acid 100mg at bedtime  - Door alarm, 1:1 as needed  - SW following for placement, placement will occur once guardianship is established  - Also per Dr. Wilks on 5/25, \"if he continues to be agitated to this degree, it may make sense to pursue adult psychiatry referral as this is a very difficult setting to manage and adjust his medications obviously, he has long-term needs in terms of placement and supervision, but efforts to assess and resolve his agitation have thus far been ineffective despite many medication changes\"    Chronic/Stable/Resolved  Possible Tardive Dyskinesia vs " EPSE  Per psych assessment on 4/1/21, noted to have possible tardive dyskinesia vs extrapyramidal side effects of meds. At that time, recommended to increase Cogentin to 1mg BID and add vitamin E 800 international unit(s) daily.      Back cellulitis in 1/2021: Resolved  Noted on 1/24/21, initiated on cephalexin at that time. Received local wound care and completed 7-day course of antibiotics on 1/30.     Baseline Hypotension: BP 90-110s systolic on average. Asymptomatic. No concerns     Hyperlipidemia: Chronic and stable on llow dose ASA and  atorvastatin     COPD: Not O2 dependent. Chronic and stable on salmeterol, albuterol prn     Hypothyroidism: Chronic and stable on levothyroxine     Tobacco use disorder: Using nicotine patch and PRN gum     Hx of infective endocarditis with severe mitral and aortic regurgitation S/P bioprosthetic valve replacement (10/2015)  Systolic heart murmur  HFrEF, not in acute exacerbation: Follows with Dr. Dockery of Heart and Vascular Cardiology, last seen in 1/2020. Note heart murmur on exam, strongest in aortic region. No CP, SOB, dizziness, syncope. Echocardiogram in 5/2016 with EF 50%, no evidence for prosthetic mitral and aortic valve dysfunction.   - Follow-up with outpatient Cardiology upon dismissal from the hospital (on every 2 year follow-ups)   - Continue ASA      Hx of CVA: Note basal ganglia stroke in 2015. No focal neuro deficits aside from cognitive dysfunction as above.     Non-severe malnutrition    DVT Prophylaxis: Patient is ambulatory  Code Status: Full Code  PT/OT: not needed  Diet: Room Service  Diet  Combination Diet Regular Diet Adult; Safe Tray - with utensils  Snacks/Supplements Adult: Other; Bedtime snack; Between Meals  Room Service      Disposition: Expected discharge pending social work, guardianship, placement    Chico Flor MD  Text Page  (7am to 6pm)  -Data reviewed today: I reviewed all new labs and imaging results over the last 24  hours.    Physical Exam   Temp: 97.6  F (36.4  C) Temp src: Axillary BP: 112/75 Pulse: 86   Resp: 18 SpO2: 96 % O2 Device: None (Room air)    Vitals:    12/11/20 0700 03/19/21 2120 03/31/21 1700   Weight: 82.6 kg (182 lb) 84.4 kg (186 lb) 84.2 kg (185 lb 9.6 oz)     Vital Signs with Ranges  Temp:  [97.6  F (36.4  C)] 97.6  F (36.4  C)  Pulse:  [86] 86  Resp:  [18] 18  BP: (112)/(75) 112/75  SpO2:  [96 %] 96 %  I/O last 3 completed shifts:  In: 870 [P.O.:870]  Out: -   O2 requirements: none    Constitutional: Male in NAD, somnolent  HEENT: Eyes nonicteric, oral mucosa moist  Cardiovascular: RRR, normal S1/2, no m/r/g  Respiratory: CTAB, no wheezing or crackles  Vascular: No LE pitting edema  GI: Normoactive bowel sounds, nontender, nondistended  Skin/Integumen: No rashes  Neuro/Psych: Sleepy, restricted affect, moves all extremities    Medications       aspirin  81 mg Oral Daily     atorvastatin  80 mg Oral At Bedtime     benztropine  1 mg Oral BID     divalproex sodium extended-release  1,000 mg Oral At Bedtime     docusate sodium  100 mg Oral BID     haloperidol  10 mg Oral TID     hydrocortisone   Topical BID     levothyroxine  88 mcg Oral Daily     memantine  5 mg Oral Daily     risperiDONE  1.5 mg Sublingual BID     salmeterol  1 puff Inhalation BID     vitamin E  800 Units Oral Daily       Data   Recent Labs   Lab 05/30/21  0822   CR 0.81       Imaging:   No results found for this or any previous visit (from the past 24 hour(s)).

## 2021-06-01 PROCEDURE — 250N000013 HC RX MED GY IP 250 OP 250 PS 637: Performed by: INTERNAL MEDICINE

## 2021-06-01 PROCEDURE — 250N000013 HC RX MED GY IP 250 OP 250 PS 637: Performed by: PSYCHIATRY & NEUROLOGY

## 2021-06-01 PROCEDURE — 120N000001 HC R&B MED SURG/OB

## 2021-06-01 PROCEDURE — 99231 SBSQ HOSP IP/OBS SF/LOW 25: CPT | Performed by: INTERNAL MEDICINE

## 2021-06-01 RX ADMIN — HALOPERIDOL 10 MG: 5 TABLET ORAL at 21:13

## 2021-06-01 RX ADMIN — SALMETEROL XINAFOATE 1 PUFF: 50 POWDER, METERED ORAL; RESPIRATORY (INHALATION) at 08:24

## 2021-06-01 RX ADMIN — RISPERIDONE 1.5 MG: 0.5 TABLET, ORALLY DISINTEGRATING ORAL at 08:19

## 2021-06-01 RX ADMIN — DOCUSATE SODIUM 100 MG: 100 CAPSULE, LIQUID FILLED ORAL at 21:13

## 2021-06-01 RX ADMIN — BENZTROPINE MESYLATE 1 MG: 0.5 TABLET ORAL at 08:20

## 2021-06-01 RX ADMIN — HALOPERIDOL 10 MG: 5 TABLET ORAL at 08:20

## 2021-06-01 RX ADMIN — DIVALPROEX SODIUM 1000 MG: 500 TABLET, FILM COATED, EXTENDED RELEASE ORAL at 21:12

## 2021-06-01 RX ADMIN — ACETAMINOPHEN 650 MG: 325 TABLET, FILM COATED ORAL at 11:45

## 2021-06-01 RX ADMIN — HYDROCORTISONE: 10 CREAM TOPICAL at 21:17

## 2021-06-01 RX ADMIN — BENZTROPINE MESYLATE 1 MG: 0.5 TABLET ORAL at 21:13

## 2021-06-01 RX ADMIN — ATORVASTATIN CALCIUM 80 MG: 40 TABLET, FILM COATED ORAL at 21:13

## 2021-06-01 RX ADMIN — LORAZEPAM 1 MG: 1 TABLET ORAL at 12:30

## 2021-06-01 RX ADMIN — Medication 800 UNITS: at 08:20

## 2021-06-01 RX ADMIN — HALOPERIDOL 10 MG: 5 TABLET ORAL at 13:47

## 2021-06-01 RX ADMIN — HYDROCORTISONE: 10 CREAM TOPICAL at 08:25

## 2021-06-01 RX ADMIN — MEMANTINE 5 MG: 5 TABLET ORAL at 08:20

## 2021-06-01 RX ADMIN — DOCUSATE SODIUM 100 MG: 100 CAPSULE, LIQUID FILLED ORAL at 08:19

## 2021-06-01 RX ADMIN — LEVOTHYROXINE SODIUM 88 MCG: 88 TABLET ORAL at 08:19

## 2021-06-01 RX ADMIN — LORAZEPAM 1 MG: 1 TABLET ORAL at 18:15

## 2021-06-01 RX ADMIN — SALMETEROL XINAFOATE 1 PUFF: 50 POWDER, METERED ORAL; RESPIRATORY (INHALATION) at 21:16

## 2021-06-01 RX ADMIN — RISPERIDONE 1.5 MG: 0.5 TABLET, ORALLY DISINTEGRATING ORAL at 21:13

## 2021-06-01 RX ADMIN — ASPIRIN 81 MG: 81 TABLET, COATED ORAL at 08:19

## 2021-06-01 ASSESSMENT — ACTIVITIES OF DAILY LIVING (ADL)
ADLS_ACUITY_SCORE: 13
ADLS_ACUITY_SCORE: 14

## 2021-06-01 NOTE — PLAN OF CARE
DATE & TIME: 5/31/2021 0689-5208  Cognitive Concerns/ Orientation : Oriented to self only, redirectable this shift.  BEHAVIOR & AGGRESSION TOOL COLOR: Yellow r/t hx: can be with agitation.   ABNL VS/O2: no vitals this shift- VS once a day  MOBILITY: Ind in room, amb in halls with sitter.  PAIN MANAGMENT: Denies  DIET: Regular- Good appetite  BOWEL/BLADDER: Continent  DRAIN/DEVICES: No IV access.  SKIN: WDL  D/C DATE: Discharge pending guardianship and placement, however now psych is pursing transfer to inpatient psych on a locked unit. Civil commitment through [facility] until 7/31/21  OTHER IMPORTANT INFO: Cont to become agitated with auditory hallucinations. PRN Risperdal given x1 for agitation. Door alarm on, sitter available for distraction and redirecting needs.

## 2021-06-01 NOTE — PLAN OF CARE
Patient A&0x3. Up ad holley. Door alarm and long arm sit remain in place. Patient restless this shift. PRN ativan given x1. He showered this shift. Played games and watched TV. He ws noted to be talking to him self at times. Denies pain. Lungs diminished. No edema. Is able to make his needs known. Awaiting placement and guardianship. Under commitment.

## 2021-06-01 NOTE — PROGRESS NOTES
"[facility]    Hospitalist Progress Note    Interval History   - No acute events today    Assessment & Plan   Summary: John Juárez is a 55 year old male with PMHx of schizophrenia, hx of TBI, alcohol use disorder, COPD, hyperlipidemia and hypothyroidism who was admitted on 9/9/2020 for evaluation of aggressive behavior at his group home.    Was evaluated by Psychiatry and his condition stabilized, though his group home was unwilling to take him back and thus hospitalization has been prolonged awaiting new placement and guardianship. Under civil commitment through [facility].   Noted increasingly agitated behavior and attempts at elopement in May 2021.     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  Brought to hospital for evaluation of aggressive behaviors. Group home now unwilling to take him back. Has had numerous CODE 21s called this stay. Seen by psych, meds adjusted. Is currently under civil commitment and court hold through [facility] until 7/31/21.  - Appreciate Psychiatry involvement   - Haldol reduced ot 5 mg tid 5/17, increase to 10 mg TID again 5/23, because of increasing agitation   - Benztropine 1mg BID    - Zyprexa 10mg BID   - Valproic acid 100mg at bedtime  - Door alarm, 1:1 as needed  - SW following for placement, placement will occur once guardianship is established  - Also per Dr. Wilks on 5/25, \"if he continues to be agitated to this degree, it may make sense to pursue adult psychiatry referral as this is a very difficult setting to manage and adjust his medications obviously, he has long-term needs in terms of placement and supervision, but efforts to assess and resolve his agitation have thus far been ineffective despite many medication changes\"    Chronic/Stable/Resolved  Possible Tardive Dyskinesia vs EPSE  Per psych assessment on 4/1/21, " noted to have possible tardive dyskinesia vs extrapyramidal side effects of meds. At that time, recommended to increase Cogentin to 1mg BID and add vitamin E 800 international unit(s) daily.      Hyperlipidemia: Chronic and stable on low dose ASA and  atorvastatin     COPD: Not O2 dependent. Chronic and stable on salmeterol, albuterol prn     Hypothyroidism: Chronic and stable on levothyroxine     Tobacco use disorder: Using nicotine patch and PRN gum     Hx of infective endocarditis with severe mitral and aortic regurgitation S/P bioprosthetic valve replacement (10/2015)  Systolic heart murmur  HFrEF, not in acute exacerbation  Follows with Dr. Dockery of Heart and Vascular Cardiology, last seen in 1/2020. Note heart murmur on exam, strongest in aortic region. No CP, SOB, dizziness, syncope. Echocardiogram in 5/2016 with EF 50%, no evidence for prosthetic mitral and aortic valve dysfunction.   - Follow-up with outpatient Cardiology upon dismissal from the hospital (on every 2 year follow-ups)   - Continue ASA      Hx of CVA: Note basal ganglia stroke in 2015. No focal neuro deficits aside from cognitive dysfunction as above.     Non-severe malnutrition    DVT Prophylaxis: Patient is ambulatory  Code Status: Full Code  PT/OT: not needed  Diet: Room Service  Diet  Combination Diet Regular Diet Adult; Safe Tray - with utensils  Snacks/Supplements Adult: Other; Bedtime snack; Between Meals  Room Service      Disposition: Expected discharge pending social work, guardianship, placement    Chico Flor MD  Text Page  (7am to 6pm)  -Data reviewed today: I reviewed all new labs and imaging results over the last 24 hours.    Physical Exam   Temp: 97.4  F (36.3  C) Temp src: Axillary BP: 131/82 Pulse: 84   Resp: 18 SpO2: 97 % O2 Device: None (Room air)    Vitals:    12/11/20 0700 03/19/21 2120 03/31/21 1700   Weight: 82.6 kg (182 lb) 84.4 kg (186 lb) 84.2 kg (185 lb 9.6 oz)     Vital Signs with Ranges  Temp:  [97.4  F  (36.3  C)] 97.4  F (36.3  C)  Pulse:  [84] 84  Resp:  [18] 18  BP: (131)/(82) 131/82  SpO2:  [97 %] 97 %  I/O last 3 completed shifts:  In: 880 [P.O.:880]  Out: -   O2 requirements: none    Constitutional: Male in NAD, somnolent  HEENT: Eyes nonicteric, oral mucosa moist  Cardiovascular: RRR, normal S1/2, no m/r/g  Respiratory: CTAB, no wheezing or crackles  Vascular: No LE pitting edema  GI: Normoactive bowel sounds, nontender, nondistended  Skin/Integumen: No rashes  Neuro/Psych: Restricted affect, moves all extremities    Medications       aspirin  81 mg Oral Daily     atorvastatin  80 mg Oral At Bedtime     benztropine  1 mg Oral BID     divalproex sodium extended-release  1,000 mg Oral At Bedtime     docusate sodium  100 mg Oral BID     haloperidol  10 mg Oral TID     hydrocortisone   Topical BID     levothyroxine  88 mcg Oral Daily     memantine  5 mg Oral Daily     risperiDONE  1.5 mg Sublingual BID     salmeterol  1 puff Inhalation BID     vitamin E  800 Units Oral Daily       Data   Recent Labs   Lab 05/30/21  0822   CR 0.81       Imaging:   No results found for this or any previous visit (from the past 24 hour(s)).

## 2021-06-01 NOTE — PLAN OF CARE
DATE & TIME: 5/31/2021 [id number]  Cognitive Concerns/ Orientation : Oriented to self only, redirectable this shift.  BEHAVIOR & AGGRESSION TOOL COLOR: Yellow r/t hx: can be with agitation.   ABNL VS/O2: no vitals this shift- VS once a day  MOBILITY: Ind in room, amb in halls with sitter.  PAIN MANAGMENT: Denies  DIET: Regular- Good appetite  BOWEL/BLADDER: Continent  DRAIN/DEVICES: No IV access.  SKIN: WDL  D/C DATE: Discharge pending guardianship and placement, however now psych is pursing transfer to inpatient psych on a locked unit. Civil commitment through [facility] until 7/31/21  OTHER IMPORTANT INFO: Cont to become agitated with auditory hallucinations. PRN Risperdal given x1 for agitation. Door alarm on, sitter available for distraction and redirecting needs.

## 2021-06-02 PROCEDURE — 99232 SBSQ HOSP IP/OBS MODERATE 35: CPT | Performed by: INTERNAL MEDICINE

## 2021-06-02 PROCEDURE — 250N000013 HC RX MED GY IP 250 OP 250 PS 637: Performed by: PSYCHIATRY & NEUROLOGY

## 2021-06-02 PROCEDURE — 99233 SBSQ HOSP IP/OBS HIGH 50: CPT | Performed by: PSYCHIATRY & NEUROLOGY

## 2021-06-02 PROCEDURE — 250N000013 HC RX MED GY IP 250 OP 250 PS 637: Performed by: INTERNAL MEDICINE

## 2021-06-02 PROCEDURE — 120N000001 HC R&B MED SURG/OB

## 2021-06-02 RX ORDER — DIVALPROEX SODIUM 500 MG/1
500 TABLET, EXTENDED RELEASE ORAL AT BEDTIME
Status: DISCONTINUED | OUTPATIENT
Start: 2021-06-02 | End: 2021-06-30 | Stop reason: HOSPADM

## 2021-06-02 RX ADMIN — BENZTROPINE MESYLATE 1 MG: 0.5 TABLET ORAL at 21:44

## 2021-06-02 RX ADMIN — LEVOTHYROXINE SODIUM 88 MCG: 88 TABLET ORAL at 09:33

## 2021-06-02 RX ADMIN — MEMANTINE 5 MG: 5 TABLET ORAL at 09:34

## 2021-06-02 RX ADMIN — SALMETEROL XINAFOATE 1 PUFF: 50 POWDER, METERED ORAL; RESPIRATORY (INHALATION) at 21:46

## 2021-06-02 RX ADMIN — RISPERIDONE 1.5 MG: 0.5 TABLET, ORALLY DISINTEGRATING ORAL at 09:33

## 2021-06-02 RX ADMIN — DOCUSATE SODIUM 100 MG: 100 CAPSULE, LIQUID FILLED ORAL at 21:43

## 2021-06-02 RX ADMIN — Medication 800 UNITS: at 09:33

## 2021-06-02 RX ADMIN — ASPIRIN 81 MG: 81 TABLET, COATED ORAL at 09:34

## 2021-06-02 RX ADMIN — HYDROCORTISONE: 10 CREAM TOPICAL at 09:34

## 2021-06-02 RX ADMIN — HALOPERIDOL 10 MG: 5 TABLET ORAL at 21:44

## 2021-06-02 RX ADMIN — BENZTROPINE MESYLATE 1 MG: 0.5 TABLET ORAL at 09:33

## 2021-06-02 RX ADMIN — HYDROCORTISONE: 10 CREAM TOPICAL at 21:46

## 2021-06-02 RX ADMIN — HALOPERIDOL 10 MG: 5 TABLET ORAL at 09:34

## 2021-06-02 RX ADMIN — DOCUSATE SODIUM 100 MG: 100 CAPSULE, LIQUID FILLED ORAL at 09:34

## 2021-06-02 RX ADMIN — DIVALPROEX SODIUM 500 MG: 500 TABLET, FILM COATED, EXTENDED RELEASE ORAL at 21:43

## 2021-06-02 RX ADMIN — ACETAMINOPHEN 650 MG: 325 TABLET, FILM COATED ORAL at 23:32

## 2021-06-02 RX ADMIN — SALMETEROL XINAFOATE 1 PUFF: 50 POWDER, METERED ORAL; RESPIRATORY (INHALATION) at 09:33

## 2021-06-02 RX ADMIN — ATORVASTATIN CALCIUM 80 MG: 40 TABLET, FILM COATED ORAL at 21:44

## 2021-06-02 RX ADMIN — RISPERIDONE 1.5 MG: 0.5 TABLET, ORALLY DISINTEGRATING ORAL at 21:46

## 2021-06-02 ASSESSMENT — ACTIVITIES OF DAILY LIVING (ADL)
ADLS_ACUITY_SCORE: 13
ADLS_ACUITY_SCORE: 12

## 2021-06-02 NOTE — PROGRESS NOTES
"[facility]    Hospitalist Progress Note    Interval History   Nurse notes patient was more unbalanced overnight. Nurse will walk with patient today and we can have PT consulted if patient has any ongoing walking issues. Otherwise I would attribute this to drowsiness related to his meds.    Assessment & Plan   Summary: John Juárez is a 55 year old male with PMHx of schizophrenia, hx of TBI, alcohol use disorder, COPD, hyperlipidemia and hypothyroidism who was admitted on 9/9/2020 for evaluation of aggressive behavior at his group home.    Was evaluated by Psychiatry and his condition stabilized, though his group home was unwilling to take him back and thus hospitalization has been prolonged awaiting new placement and guardianship. Under civil commitment through [facility].   Noted increasingly agitated behavior and attempts at elopement in May 2021.     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  Brought to hospital for evaluation of aggressive behaviors. Group home now unwilling to take him back. Has had numerous CODE 21s called this stay. Seen by psych, meds adjusted. Is currently under civil commitment and court hold through [facility] until 7/31/21.  - Appreciate Psychiatry involvement   - Haldol reduced ot 5 mg tid 5/17, increase to 10 mg TID again 5/23, because of increasing agitation   - Benztropine 1mg BID    - Zyprexa 10mg BID   - Valproic acid 100mg at bedtime  - Door alarm, 1:1 as needed  - SW following for placement, placement will occur once guardianship is established  - Also per Dr. Wilks on 5/25, \"if he continues to be agitated to this degree, it may make sense to pursue adult psychiatry referral as this is a very difficult setting to manage and adjust his medications obviously, he has long-term needs in terms of placement and supervision, but " "efforts to assess and resolve his agitation have thus far been ineffective despite many medication changes\"    Chronic/Stable/Resolved  Possible Tardive Dyskinesia vs EPSE  Per psych assessment on 4/1/21, noted to have possible tardive dyskinesia vs extrapyramidal side effects of meds. At that time, recommended to increase Cogentin to 1mg BID and add vitamin E 800 international unit(s) daily.      Hyperlipidemia: Chronic and stable on low dose ASA and  atorvastatin     COPD: Not O2 dependent. Chronic and stable on salmeterol, albuterol prn     Hypothyroidism: Chronic and stable on levothyroxine     Tobacco use disorder: Using nicotine patch and PRN gum     Hx of infective endocarditis with severe mitral and aortic regurgitation S/P bioprosthetic valve replacement (10/2015)  Systolic heart murmur  HFrEF, not in acute exacerbation  Follows with Dr. Dockery of Heart and Vascular Cardiology, last seen in 1/2020. Note heart murmur on exam, strongest in aortic region. No CP, SOB, dizziness, syncope. Echocardiogram in 5/2016 with EF 50%, no evidence for prosthetic mitral and aortic valve dysfunction.   - Follow-up with outpatient Cardiology upon dismissal from the hospital (on every 2 year follow-ups)   - Continue ASA      Hx of CVA: Note basal ganglia stroke in 2015. No focal neuro deficits aside from cognitive dysfunction as above.     Non-severe malnutrition    DVT Prophylaxis: Patient is ambulatory  Code Status: Full Code  PT/OT: not needed  Diet: Room Service  Diet  Combination Diet Regular Diet Adult; Safe Tray - with utensils  Snacks/Supplements Adult: Other; Bedtime snack; Between Meals  Room Service      Disposition: Expected discharge pending social work, guardianship, placement    Chico Flor MD  Text Page  (7am to 6pm)  -Data reviewed today: I reviewed all new labs and imaging results over the last 24 hours.    Physical Exam   Temp: 98.6  F (37  C) Temp src: Axillary BP: 94/73 Pulse: 104   Resp: 18 " SpO2: 94 % O2 Device: None (Room air)    Vitals:    12/11/20 0700 03/19/21 2120 03/31/21 1700   Weight: 82.6 kg (182 lb) 84.4 kg (186 lb) 84.2 kg (185 lb 9.6 oz)     Vital Signs with Ranges  Temp:  [98.6  F (37  C)] 98.6  F (37  C)  Pulse:  [104] 104  Resp:  [18] 18  BP: (94)/(73) 94/73  SpO2:  [94 %] 94 %  No intake/output data recorded.  O2 requirements: none    Constitutional: Male in NAD, somnolent  HEENT: Eyes nonicteric, oral mucosa moist  Cardiovascular: RRR, normal S1/2, no m/r/g  Respiratory: CTAB, no wheezing or crackles  Vascular: No LE pitting edema  GI: Normoactive bowel sounds, nontender, nondistended  Skin/Integumen: No rashes  Neuro/Psych: Restricted affect, moves all extremities    Medications       aspirin  81 mg Oral Daily     atorvastatin  80 mg Oral At Bedtime     benztropine  1 mg Oral BID     divalproex sodium extended-release  1,000 mg Oral At Bedtime     docusate sodium  100 mg Oral BID     haloperidol  10 mg Oral TID     hydrocortisone   Topical BID     levothyroxine  88 mcg Oral Daily     memantine  5 mg Oral Daily     risperiDONE  1.5 mg Sublingual BID     salmeterol  1 puff Inhalation BID     vitamin E  800 Units Oral Daily       Data   Recent Labs   Lab 05/30/21  0822   CR 0.81       Imaging:   No results found for this or any previous visit (from the past 24 hour(s)).

## 2021-06-02 NOTE — PLAN OF CARE
SUMMARY: Court hold until 7/31/21, awaiting placement  DATE & TIME: 6/1/2021 6466-1302  Cognitive Concerns/ Orientation : Oriented to self only, redirectable this shift.  BEHAVIOR & AGGRESSION TOOL COLOR: Yellow r/t hx: easily agitated  ABNL VS/O2: VSS on RA (vitals q24h)  MOBILITY: Assist +1 and GB in room this shift, wheelchair in hallways- increased weakness noted, patient is unsteady  PAIN MANAGMENT: Denies  DIET: Regular- Good appetite  BOWEL/BLADDER: Continent  DRAIN/DEVICES: No IV access  SKIN: WDL  D/C DATE: Discharge pending guardianship and placement, however now psych is pursing transfer to inpatient psych on a locked unit. Civil commitment through [facility] until 7/31/21  OTHER IMPORTANT INFO: Slept most of the night.

## 2021-06-02 NOTE — PLAN OF CARE
SUMMARY: Court hold until 7/31/21, awaiting placement  DATE & TIME: 6/4/2021 4683-1489  Cognitive Concerns/ Orientation : Oriented to self only. Lethargic.   BEHAVIOR & AGGRESSION TOOL COLOR: green.   ABNL VS/O2: Soft BP (baseline), HR in low 100s. RA. Lungs diminished.   MOBILITY: Assist +1 and GB in room this shift, wheelchair in hallways- increased weakness noted, patient is unsteady  PAIN MANAGMENT: Denies  DIET: Regular, sleeping through breakfast and lunch.   BOWEL/BLADDER: Continent  DRAIN/DEVICES: No IV access  SKIN: WDL  D/C DATE: Discharge pending guardianship and placement, however now psych is pursing transfer to inpatient psych on a locked unit. Civil commitment through [facility] until 7/31/21  OTHER IMPORTANT INFO: sitter at bedside due to elopement risk.

## 2021-06-02 NOTE — PLAN OF CARE
SUMMARY: Court hold until 7/31/21, awaiting placement  DATE & TIME: 6/1/2021 3190-4503  Cognitive Concerns/ Orientation : Oriented to self only, redirectable this shift.  BEHAVIOR & AGGRESSION TOOL COLOR: Yellow r/t hx: easily agitated  ABNL VS/O2: VSS on RA (vitals q24h)  MOBILITY: Assist +1 and GB in room this shift, wheelchair in hallways- increased weakness noted, patient is unsteady  PAIN MANAGMENT: Denies  DIET: Regular- Good appetite  BOWEL/BLADDER: Continent  DRAIN/DEVICES: No IV access  SKIN: WDL  D/C DATE: Discharge pending guardianship and placement, however now psych is pursing transfer to inpatient psych on a locked unit. Civil commitment through [facility] until 7/31/21  OTHER IMPORTANT INFO: Increased restlessness, stating he was going to leave down the stairs- PRN Ativan x1.

## 2021-06-02 NOTE — CONSULTS
[facility] Psychiatric Consult Progress Note    Interval History:   I met with the patient on station [id number].  Routine follow-up was requested with respect to his psychiatric medications, and complete forms to extend his commitment and Yanez.  I note that his Depakote level has been running high at 104, ammonia level was 51.  We will reduce that dose to decrease likelihood of sedation.  He continues to exhibit some periods of agitation, poor insight, paranoia.  He is a very poor historian, mumbles his responses and a very soft voice.  At this juncture extending the commitment which expires next month is necessary, they are still working on a guardianship which is a very complicated and lengthy process.    10 point Review of Systems completed by Sushil Wilks MD , and is  is negative other than noted in the HPI     Medications:       aspirin  81 mg Oral Daily     atorvastatin  80 mg Oral At Bedtime     benztropine  1 mg Oral BID     divalproex sodium extended-release  500 mg Oral At Bedtime     docusate sodium  100 mg Oral BID     haloperidol  10 mg Oral TID     hydrocortisone   Topical BID     levothyroxine  88 mcg Oral Daily     memantine  5 mg Oral Daily     risperiDONE  1.5 mg Sublingual BID     salmeterol  1 puff Inhalation BID     vitamin E  800 Units Oral Daily     acetaminophen, albuterol, alum & mag hydroxide-simethicone, haloperidol lactate, LORazepam, melatonin, naloxone **OR** naloxone **OR** naloxone **OR** naloxone, nicotine, nicotine, ondansetron **OR** ondansetron, polyethylene glycol, polyethylene glycol-propylene glycol, risperiDONE    Mental Status Examination:   Appearance: He appears fatigued, lying on his right side, eyes closed, he awakens, mumbles his responses  Eye Contact: He seems to stare blankly at times when he does open his eyes, intermittently closing eyes during my visit   speech:  Impoverished, dysarthric, monotone, speaks in a whisper  language:  "Poor  Psychomotor Behavior:  some buccal-lingual dyskinesia, chewing movements  Mood: \"Mumbling\"  affect: Blunted   thought Process:  paucity of speech, no obvious loosening of associations flight of ideas or formal thought disorder  Thought Content:  no evidence of suicidal ideation or homicidal ideation, but at times he does tend to stare off into space, looks a guarded, but pleasant  Oriented to: Person only, he thought he was at the [facility] stating \"I am at [facility]\"  Attention Span and Concentration:  poor  Recent and Remote Memory:  limited  Fund of Knowledge: delayed  Insight:  limited  Judgment:  limited       Labs/Vitals:     No results found for this or any previous visit (from the past 24 hour(s)).  B/P: 123/95, T: 97.6, P: 89, R: 18  Impression  John is a 55-year-old gentleman with a known major neurocognitive disorder and schizophrenic spectrum illness.  He continues to show significant impairment, his Depakote level is running high.  There is a need to extend his commitment process and Yanez given the very lengthy/complex issues we are dealing with in terms of disposition and his lack of decisional capacity     1.  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   2.  Other schizophrenia spectrum disorder.   3.  Alcohol use disorder in remission.   4.  Stimulant use disorder, in remission.   5.  Chronic obstructive pulmonary disease.   6.  Hypertension.   7. R/O mild TD vs EPSE       Plan:   1.  Reduce Depakote ER to 500 mg nightly due to the elevated blood level, slightly elevated ammonia level   2.  We will repeat a Depakote level in the next 3 days   3.  Continue with door alarm, he is under commitment, I filled out paperwork to extend his commitment and his Yanez request for Haldol, Risperdal, Zyprexa, and Invega   4.  Still working on community placement, efforts to move him into a locked psychiatric setting have thus far " been unsuccessful given the complexity of his case  Attestation:  Patient has been seen and evaluated by me,  Sushil Wilks MD    Visit/Communication Style   In person, face-to-face

## 2021-06-03 PROCEDURE — 250N000013 HC RX MED GY IP 250 OP 250 PS 637: Performed by: INTERNAL MEDICINE

## 2021-06-03 PROCEDURE — 250N000013 HC RX MED GY IP 250 OP 250 PS 637: Performed by: HOSPITALIST

## 2021-06-03 PROCEDURE — 250N000013 HC RX MED GY IP 250 OP 250 PS 637: Performed by: PSYCHIATRY & NEUROLOGY

## 2021-06-03 PROCEDURE — 99232 SBSQ HOSP IP/OBS MODERATE 35: CPT | Performed by: INTERNAL MEDICINE

## 2021-06-03 PROCEDURE — 120N000001 HC R&B MED SURG/OB

## 2021-06-03 RX ADMIN — ASPIRIN 81 MG: 81 TABLET, COATED ORAL at 10:25

## 2021-06-03 RX ADMIN — RISPERIDONE 1.5 MG: 0.5 TABLET, ORALLY DISINTEGRATING ORAL at 21:17

## 2021-06-03 RX ADMIN — HALOPERIDOL 10 MG: 5 TABLET ORAL at 13:37

## 2021-06-03 RX ADMIN — SALMETEROL XINAFOATE 1 PUFF: 50 POWDER, METERED ORAL; RESPIRATORY (INHALATION) at 10:26

## 2021-06-03 RX ADMIN — DOCUSATE SODIUM 100 MG: 100 CAPSULE, LIQUID FILLED ORAL at 21:17

## 2021-06-03 RX ADMIN — HALOPERIDOL 10 MG: 5 TABLET ORAL at 10:24

## 2021-06-03 RX ADMIN — RISPERIDONE 1.5 MG: 0.5 TABLET, ORALLY DISINTEGRATING ORAL at 10:24

## 2021-06-03 RX ADMIN — Medication 800 UNITS: at 10:25

## 2021-06-03 RX ADMIN — HYDROCORTISONE: 10 CREAM TOPICAL at 10:26

## 2021-06-03 RX ADMIN — POLYETHYLENE GLYCOL 400 AND PROPYLENE GLYCOL 1 DROP: 4; 3 SOLUTION/ DROPS OPHTHALMIC at 10:37

## 2021-06-03 RX ADMIN — LEVOTHYROXINE SODIUM 88 MCG: 88 TABLET ORAL at 10:25

## 2021-06-03 RX ADMIN — BENZTROPINE MESYLATE 1 MG: 0.5 TABLET ORAL at 10:25

## 2021-06-03 RX ADMIN — DIVALPROEX SODIUM 500 MG: 500 TABLET, FILM COATED, EXTENDED RELEASE ORAL at 21:17

## 2021-06-03 RX ADMIN — BENZTROPINE MESYLATE 1 MG: 0.5 TABLET ORAL at 21:17

## 2021-06-03 RX ADMIN — RISPERIDONE 1 MG: 1 TABLET, ORALLY DISINTEGRATING ORAL at 16:51

## 2021-06-03 RX ADMIN — MEMANTINE 5 MG: 5 TABLET ORAL at 10:25

## 2021-06-03 RX ADMIN — ATORVASTATIN CALCIUM 80 MG: 40 TABLET, FILM COATED ORAL at 21:17

## 2021-06-03 RX ADMIN — HYDROCORTISONE: 10 CREAM TOPICAL at 21:20

## 2021-06-03 RX ADMIN — HALOPERIDOL 10 MG: 5 TABLET ORAL at 21:16

## 2021-06-03 RX ADMIN — DOCUSATE SODIUM 100 MG: 100 CAPSULE, LIQUID FILLED ORAL at 10:25

## 2021-06-03 RX ADMIN — MELATONIN TAB 3 MG 3 MG: 3 TAB at 21:17

## 2021-06-03 RX ADMIN — SALMETEROL XINAFOATE 1 PUFF: 50 POWDER, METERED ORAL; RESPIRATORY (INHALATION) at 21:16

## 2021-06-03 ASSESSMENT — ACTIVITIES OF DAILY LIVING (ADL)
ADLS_ACUITY_SCORE: 13
ADLS_ACUITY_SCORE: 13
ADLS_ACUITY_SCORE: 12
ADLS_ACUITY_SCORE: 12
ADLS_ACUITY_SCORE: 13
ADLS_ACUITY_SCORE: 14

## 2021-06-03 NOTE — PLAN OF CARE
SUMMARY: Court hold until 7/31/21, awaiting placement  DATE & TIME: 6/2/2021 5014-9914  Cognitive Concerns/ Orientation : Oriented to self only. Lethargic.   BEHAVIOR & AGGRESSION TOOL COLOR: Green (hx of yellow)  ABNL VS/O2: q24h vitals completed during AM shift. Soft BP (baseline), HR in low 100s on RA.   MOBILITY: Assist+1 and GB in room this shift, wheelchair in hallways- increased weakness noted, patient is unsteady  PAIN MANAGMENT: Denies  DIET: Regular, tolerating  BOWEL/BLADDER: Continent  DRAIN/DEVICES: No IV access  SKIN: WDL  D/C DATE: Discharge pending guardianship and placement. Civil commitment through [facility] until 7/31/21  OTHER IMPORTANT INFO: Sitter at bedside.

## 2021-06-03 NOTE — PLAN OF CARE
DATE & TIME: 6/3/21; 2502-7019  Cognitive Concerns/ Orientation : Oriented to self only. More awake this am.    BEHAVIOR & AGGRESSION TOOL COLOR: Green (hx of yellow)  ABNL VS/O2: q24h vitals completed during AM shift. VSS.   MOBILITY: Assist+1 and GB in room and hallway. Using wheelchair at times to roam in hallway. More steady on feet this am.   PAIN MANAGMENT: Denies  DIET: Regular, tolerating  BOWEL/BLADDER: Continent  DRAIN/DEVICES: No IV access  SKIN: WDL  D/C DATE: Discharge pending guardianship and placement. Seen by psych last evening--decreased Depakote to 500 mg nightly, extended his commitment and jack.   OTHER IMPORTANT INFO: Sitter at bedside for elopement risk.

## 2021-06-03 NOTE — PLAN OF CARE
SUMMARY: Court hold until 7/31/21, awaiting placement  DATE & TIME: 6/3/21; 0243-8945  Cognitive Concerns/ Orientation : Oriented to self only. Lethargic.   BEHAVIOR & AGGRESSION TOOL COLOR: Green (hx of yellow)  ABNL VS/O2: q24h vitals completed during AM shift. Soft BP (baseline), HR in low 100s on RA.   MOBILITY: Assist+1 and GB in room this shift, wheelchair in hallways- increased weakness noted, patient is unsteady  PAIN MANAGMENT: Denies  DIET: Regular, tolerating  BOWEL/BLADDER: Continent  DRAIN/DEVICES: No IV access  SKIN: WDL  D/C DATE: Discharge pending guardianship and placement. Civil commitment through [facility] until 7/31/21  OTHER IMPORTANT INFO: Sitter at bedside.

## 2021-06-03 NOTE — PROGRESS NOTES
"[facility]    Hospitalist Progress Note    Interval History   - Appreciate Psych involvement  - Assessed patient's ambulation, he appears steady on his feet with CGA. No need for PT  - No acute issues today    Assessment & Plan   Summary: John Juárez is a 55 year old male with PMHx of schizophrenia, hx of TBI, alcohol use disorder, COPD, hyperlipidemia and hypothyroidism who was admitted on 9/9/2020 for evaluation of aggressive behavior at his group home.    Was evaluated by Psychiatry and his condition stabilized, though his group home was unwilling to take him back and thus hospitalization has been prolonged awaiting new placement and guardianship. Under civil commitment through [facility].   Noted increasingly agitated behavior and attempts at elopement in May 2021.     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  Brought to hospital for evaluation of aggressive behaviors. Group home now unwilling to take him back. Has had numerous CODE 21s called this stay. Seen by psych, meds adjusted. Is currently under civil commitment and court hold through [facility] until 7/31/21.  - Appreciate Psychiatry involvement   - Haldol increase to 10 mg TID again 5/23, because of increasing agitation   - Benztropine 1mg BID for EPS   - Valproic acid 500mg at bedtime  - Door alarm, 1:1 as needed  - SW following for placement, placement will occur once guardianship is established  - Also per Dr. Wilks on 5/25, \"if he continues to be agitated to this degree, it may make sense to pursue adult psychiatry referral as this is a very difficult setting to manage and adjust his medications obviously, he has long-term needs in terms of placement and supervision, but efforts to assess and resolve his agitation have thus far been ineffective despite many medication " "changes\"    Chronic/Stable/Resolved  Possible Tardive Dyskinesia vs EPSE  Per psych assessment on 4/1/21, noted to have possible tardive dyskinesia vs extrapyramidal side effects of meds. At that time, recommended to increase Cogentin to 1mg BID and add vitamin E 800 international unit(s) daily.      Hyperlipidemia: Chronic and stable on low dose ASA and  atorvastatin     COPD: Not O2 dependent. Chronic and stable on salmeterol, albuterol prn     Hypothyroidism: Chronic and stable on levothyroxine     Tobacco use disorder: Using nicotine patch and PRN gum     Hx of infective endocarditis with severe mitral and aortic regurgitation S/P bioprosthetic valve replacement (10/2015)  Systolic heart murmur  HFrEF, not in acute exacerbation  Follows with Dr. Dockery of Heart and Vascular Cardiology, last seen in 1/2020. Note heart murmur on exam, strongest in aortic region. No CP, SOB, dizziness, syncope. Echocardiogram in 5/2016 with EF 50%, no evidence for prosthetic mitral and aortic valve dysfunction.   - Follow-up with outpatient Cardiology upon dismissal from the hospital (on every 2 year follow-ups)   - Continue ASA      Hx of CVA: Note basal ganglia stroke in 2015. No focal neuro deficits aside from cognitive dysfunction as above.     Non-severe malnutrition    DVT Prophylaxis: Patient is ambulatory  Code Status: Full Code  PT/OT: not needed  Diet: Room Service  Diet  Combination Diet Regular Diet Adult; Safe Tray - with utensils  Snacks/Supplements Adult: Other; Bedtime snack; Between Meals  Room Service      Disposition: Expected discharge pending social work, guardianship, placement    Chico Flor MD  Text Page  (7am to 6pm)  -Data reviewed today: I reviewed all new labs and imaging results over the last 24 hours.    Physical Exam   Temp: 97.1  F (36.2  C) Temp src: Oral BP: 111/63 Pulse: 70   Resp: 16 SpO2: 98 % O2 Device: None (Room air)    Vitals:    12/11/20 0700 03/19/21 2120 03/31/21 1700   Weight: " 82.6 kg (182 lb) 84.4 kg (186 lb) 84.2 kg (185 lb 9.6 oz)     Vital Signs with Ranges  Temp:  [97.1  F (36.2  C)-98.1  F (36.7  C)] 97.1  F (36.2  C)  Pulse:  [] 70  Resp:  [14-16] 16  BP: ()/(61-63) 111/63  SpO2:  [95 %-98 %] 98 %  I/O last 3 completed shifts:  In: 360 [P.O.:360]  Out: -   O2 requirements: none    Constitutional: Male in NAD, somnolent  HEENT: Eyes nonicteric, oral mucosa moist  Cardiovascular: RRR, normal S1/2, no m/r/g  Respiratory: CTAB, no wheezing or crackles  Vascular: No LE pitting edema  GI: Normoactive bowel sounds, nontender, nondistended  Skin/Integumen: No rashes  Neuro/Psych: Restricted affect, moves all extremities    Medications       aspirin  81 mg Oral Daily     atorvastatin  80 mg Oral At Bedtime     benztropine  1 mg Oral BID     divalproex sodium extended-release  500 mg Oral At Bedtime     docusate sodium  100 mg Oral BID     haloperidol  10 mg Oral TID     hydrocortisone   Topical BID     levothyroxine  88 mcg Oral Daily     memantine  5 mg Oral Daily     risperiDONE  1.5 mg Sublingual BID     salmeterol  1 puff Inhalation BID     vitamin E  800 Units Oral Daily       Data   Recent Labs   Lab 05/30/21  0822   CR 0.81       Imaging:   No results found for this or any previous visit (from the past 24 hour(s)).

## 2021-06-04 PROCEDURE — 250N000013 HC RX MED GY IP 250 OP 250 PS 637: Performed by: PSYCHIATRY & NEUROLOGY

## 2021-06-04 PROCEDURE — 99231 SBSQ HOSP IP/OBS SF/LOW 25: CPT | Performed by: INTERNAL MEDICINE

## 2021-06-04 PROCEDURE — 250N000013 HC RX MED GY IP 250 OP 250 PS 637: Performed by: INTERNAL MEDICINE

## 2021-06-04 PROCEDURE — 120N000001 HC R&B MED SURG/OB

## 2021-06-04 RX ADMIN — HALOPERIDOL 10 MG: 5 TABLET ORAL at 14:58

## 2021-06-04 RX ADMIN — MEMANTINE 5 MG: 5 TABLET ORAL at 08:32

## 2021-06-04 RX ADMIN — BENZTROPINE MESYLATE 1 MG: 0.5 TABLET ORAL at 21:15

## 2021-06-04 RX ADMIN — HALOPERIDOL 10 MG: 5 TABLET ORAL at 08:32

## 2021-06-04 RX ADMIN — SALMETEROL XINAFOATE 1 PUFF: 50 POWDER, METERED ORAL; RESPIRATORY (INHALATION) at 21:14

## 2021-06-04 RX ADMIN — Medication 800 UNITS: at 08:32

## 2021-06-04 RX ADMIN — RISPERIDONE 1.5 MG: 0.5 TABLET, ORALLY DISINTEGRATING ORAL at 21:15

## 2021-06-04 RX ADMIN — DOCUSATE SODIUM 100 MG: 100 CAPSULE, LIQUID FILLED ORAL at 08:32

## 2021-06-04 RX ADMIN — SALMETEROL XINAFOATE 1 PUFF: 50 POWDER, METERED ORAL; RESPIRATORY (INHALATION) at 08:36

## 2021-06-04 RX ADMIN — BENZTROPINE MESYLATE 1 MG: 0.5 TABLET ORAL at 08:32

## 2021-06-04 RX ADMIN — ASPIRIN 81 MG: 81 TABLET, COATED ORAL at 08:32

## 2021-06-04 RX ADMIN — RISPERIDONE 1.5 MG: 0.5 TABLET, ORALLY DISINTEGRATING ORAL at 08:32

## 2021-06-04 RX ADMIN — ATORVASTATIN CALCIUM 80 MG: 40 TABLET, FILM COATED ORAL at 21:14

## 2021-06-04 RX ADMIN — DOCUSATE SODIUM 100 MG: 100 CAPSULE, LIQUID FILLED ORAL at 21:15

## 2021-06-04 RX ADMIN — DIVALPROEX SODIUM 500 MG: 500 TABLET, FILM COATED, EXTENDED RELEASE ORAL at 21:15

## 2021-06-04 RX ADMIN — LEVOTHYROXINE SODIUM 88 MCG: 88 TABLET ORAL at 08:32

## 2021-06-04 RX ADMIN — HALOPERIDOL 10 MG: 5 TABLET ORAL at 21:14

## 2021-06-04 ASSESSMENT — ACTIVITIES OF DAILY LIVING (ADL)
ADLS_ACUITY_SCORE: 13

## 2021-06-04 NOTE — PLAN OF CARE
DATE & TIME: 6/4/21 6486-8528  Cognitive Concerns/ Orientation : Oriented to self only, disoriented to place, time, situation  BEHAVIOR & AGGRESSION TOOL COLOR: Yellow, called code green x1  ABNL VS/O2: q24h vitals completed during AM shift. VSS.   MOBILITY: up independently in room, SBA in hallways, steady  PAIN MANAGMENT: Denies  DIET: Regular  BOWEL/BLADDER: Continent  DRAIN/DEVICES: No IV access  SKIN: WDL  D/C DATE: Awaiting placement, guardianship, SW following, also currently under civil commitment and court hold through [facility] until 7/31/21.  OTHER IMPORTANT INFO: 1:1 Sitter and door alarm in place for elopement risk. Pt tried to leave the floor x1, code green called,  able to redirect back to the room without any intervention.

## 2021-06-04 NOTE — PROGRESS NOTES
"[facility]    Hospitalist Progress Note    Interval History   - Some agitation yesterday, was upset after he was told by security he would be here \"until July\"  - No acute issues today     Assessment & Plan   Summary: John Juárez is a 55 year old male with PMHx of schizophrenia, hx of TBI, alcohol use disorder, COPD, hyperlipidemia and hypothyroidism who was admitted on 9/9/2020 for evaluation of aggressive behavior at his group home.    Was evaluated by Psychiatry and his condition stabilized, though his group home was unwilling to take him back and thus hospitalization has been prolonged awaiting new placement and guardianship. Under civil commitment through [facility].   Noted increasingly agitated behavior and attempts at elopement in May 2021.     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  Brought to hospital for evaluation of aggressive behaviors. Group home now unwilling to take him back. Has had numerous CODE 21s called this stay. Seen by psych, meds adjusted. Is currently under civil commitment and court hold through [facility] until 7/31/21.  - Appreciate Psychiatry involvement   - Haldol increase to 10 mg TID again 5/23, because of increasing agitation   - Benztropine 1mg BID for EPS   - Valproic acid 500mg at bedtime  - Door alarm, 1:1 as needed  - SW following for placement, placement will occur once guardianship is established  - Also per Dr. Wilks on 5/25, \"if he continues to be agitated to this degree, it may make sense to pursue adult psychiatry referral as this is a very difficult setting to manage and adjust his medications obviously, he has long-term needs in terms of placement and supervision, but efforts to assess and resolve his agitation have thus far been ineffective despite many medication changes\"    Chronic/Stable/Resolved  Possible " Tardive Dyskinesia vs EPSE  Per psych assessment on 4/1/21, noted to have possible tardive dyskinesia vs extrapyramidal side effects of meds. At that time, recommended to increase Cogentin to 1mg BID and add vitamin E 800 international unit(s) daily.      Hyperlipidemia: Chronic and stable on low dose ASA and  atorvastatin     COPD: Not O2 dependent. Chronic and stable on salmeterol, albuterol prn     Hypothyroidism: Chronic and stable on levothyroxine     Tobacco use disorder: Using nicotine patch and PRN gum     Hx of infective endocarditis with severe mitral and aortic regurgitation S/P bioprosthetic valve replacement (10/2015)  Systolic heart murmur  HFrEF, not in acute exacerbation  Follows with Dr. Dockery of [facility] V[facility]iology, last seen in 1/2020. Note heart murmur on exam, strongest in aortic region. No CP, SOB, dizziness, syncope. Echocardiogram in 5/2016 with EF 50%, no evidence for prosthetic mitral and aortic valve dysfunction.   - Follow-up with outpatient Cardiology upon dismissal from the hospital (on every 2 year follow-ups)   - Continue ASA      Hx of CVA: Note basal ganglia stroke in 2015. No focal neuro deficits aside from cognitive dysfunction as above.     Non-severe malnutrition    DVT Prophylaxis: Patient is ambulatory  Code Status: Full Code  PT/OT: not needed  Diet: Room Service  Diet  Combination Diet Regular Diet Adult; Safe Tray - with utensils  Snacks/Supplements Adult: Other; Bedtime snack; Between Meals  Room Service      Disposition: Expected discharge pending social work, guardianship, placement    Chico Flor MD  Text Page  (7am to 6pm)  -Data reviewed today: I reviewed all new labs and imaging results over the last 24 hours.    Physical Exam                      Vitals:    12/11/20 0700 03/19/21 2120 03/31/21 1700   Weight: 82.6 kg (182 lb) 84.4 kg (186 lb) 84.2 kg (185 lb 9.6 oz)     Vital Signs with Ranges     I/O last 3 completed shifts:  In: 840  [P.O.:840]  Out: -   O2 requirements: none    Constitutional: Male in NAD, somnolent  HEENT: Eyes nonicteric, oral mucosa moist  Cardiovascular: RRR, normal S1/2, no m/r/g  Respiratory: CTAB, no wheezing or crackles  Vascular: No LE pitting edema  GI: Normoactive bowel sounds, nontender, nondistended  Skin/Integumen: No rashes  Neuro/Psych: Restricted affect, moves all extremities    Medications       aspirin  81 mg Oral Daily     atorvastatin  80 mg Oral At Bedtime     benztropine  1 mg Oral BID     divalproex sodium extended-release  500 mg Oral At Bedtime     docusate sodium  100 mg Oral BID     haloperidol  10 mg Oral TID     levothyroxine  88 mcg Oral Daily     memantine  5 mg Oral Daily     risperiDONE  1.5 mg Sublingual BID     salmeterol  1 puff Inhalation BID     vitamin E  800 Units Oral Daily       Data   Recent Labs   Lab 05/30/21  0822   CR 0.81       Imaging:   No results found for this or any previous visit (from the past 24 hour(s)).

## 2021-06-04 NOTE — PLAN OF CARE
DATE & TIME: 6/3/21-6/4/21 8365-3467  Cognitive Concerns/ Orientation : Oriented to self only, slept most of shift  BEHAVIOR & AGGRESSION TOOL COLOR: Green (hx of yellow)  ABNL VS/O2: q24h vitals completed during AM shift. VSS.   MOBILITY: Assist+1 and GB in room, unsteady on feet over night, more steady this AM when up to BR.   PAIN MANAGMENT: Denies  DIET: Regular  BOWEL/BLADDER: Continent  DRAIN/DEVICES: No IV access  SKIN: WDL  D/C DATE: Decreased Depakote to 500 mg nightly, extended his commitment and jack. Awaiting placement.  OTHER IMPORTANT INFO: Sitter at bedside for elopement risk.

## 2021-06-05 LAB — VALPROATE SERPL-MCNC: 77 MG/L (ref 50–100)

## 2021-06-05 PROCEDURE — 250N000013 HC RX MED GY IP 250 OP 250 PS 637: Performed by: INTERNAL MEDICINE

## 2021-06-05 PROCEDURE — 250N000013 HC RX MED GY IP 250 OP 250 PS 637: Performed by: HOSPITALIST

## 2021-06-05 PROCEDURE — 80164 ASSAY DIPROPYLACETIC ACD TOT: CPT | Performed by: PSYCHIATRY & NEUROLOGY

## 2021-06-05 PROCEDURE — 36415 COLL VENOUS BLD VENIPUNCTURE: CPT | Performed by: PSYCHIATRY & NEUROLOGY

## 2021-06-05 PROCEDURE — 250N000013 HC RX MED GY IP 250 OP 250 PS 637: Performed by: PSYCHIATRY & NEUROLOGY

## 2021-06-05 PROCEDURE — 99231 SBSQ HOSP IP/OBS SF/LOW 25: CPT | Performed by: HOSPITALIST

## 2021-06-05 PROCEDURE — 120N000001 HC R&B MED SURG/OB

## 2021-06-05 RX ADMIN — SALMETEROL XINAFOATE 1 PUFF: 50 POWDER, METERED ORAL; RESPIRATORY (INHALATION) at 09:59

## 2021-06-05 RX ADMIN — MEMANTINE 5 MG: 5 TABLET ORAL at 09:59

## 2021-06-05 RX ADMIN — BENZTROPINE MESYLATE 1 MG: 0.5 TABLET ORAL at 21:47

## 2021-06-05 RX ADMIN — HALOPERIDOL 10 MG: 5 TABLET ORAL at 09:59

## 2021-06-05 RX ADMIN — BENZTROPINE MESYLATE 1 MG: 0.5 TABLET ORAL at 09:59

## 2021-06-05 RX ADMIN — RISPERIDONE 1.5 MG: 0.5 TABLET, ORALLY DISINTEGRATING ORAL at 09:59

## 2021-06-05 RX ADMIN — RISPERIDONE 1.5 MG: 0.5 TABLET, ORALLY DISINTEGRATING ORAL at 21:47

## 2021-06-05 RX ADMIN — Medication 800 UNITS: at 09:59

## 2021-06-05 RX ADMIN — SALMETEROL XINAFOATE 1 PUFF: 50 POWDER, METERED ORAL; RESPIRATORY (INHALATION) at 21:49

## 2021-06-05 RX ADMIN — DOCUSATE SODIUM 100 MG: 100 CAPSULE, LIQUID FILLED ORAL at 09:59

## 2021-06-05 RX ADMIN — HALOPERIDOL 10 MG: 5 TABLET ORAL at 21:47

## 2021-06-05 RX ADMIN — HALOPERIDOL 10 MG: 5 TABLET ORAL at 14:09

## 2021-06-05 RX ADMIN — MELATONIN TAB 3 MG 3 MG: 3 TAB at 21:47

## 2021-06-05 RX ADMIN — DOCUSATE SODIUM 100 MG: 100 CAPSULE, LIQUID FILLED ORAL at 21:47

## 2021-06-05 RX ADMIN — LEVOTHYROXINE SODIUM 88 MCG: 88 TABLET ORAL at 10:00

## 2021-06-05 RX ADMIN — ATORVASTATIN CALCIUM 80 MG: 40 TABLET, FILM COATED ORAL at 21:47

## 2021-06-05 RX ADMIN — ASPIRIN 81 MG: 81 TABLET, COATED ORAL at 10:00

## 2021-06-05 RX ADMIN — DIVALPROEX SODIUM 500 MG: 500 TABLET, FILM COATED, EXTENDED RELEASE ORAL at 21:47

## 2021-06-05 ASSESSMENT — ACTIVITIES OF DAILY LIVING (ADL)
ADLS_ACUITY_SCORE: 13

## 2021-06-05 NOTE — PLAN OF CARE
DATE & TIME: 6/5/21 5845-7619  Cognitive Concerns/ Orientation : Oriented to self only, disoriented to place, time, situation  BEHAVIOR & AGGRESSION TOOL COLOR: Green  ABNL VS/O2: q24h vitals completed during AM shift. VSS.   MOBILITY: up independently in room, SBA in hallways, steady  PAIN MANAGMENT: Denies  DIET: Regular  BOWEL/BLADDER: Continent  DRAIN/DEVICES: No IV access  SKIN: WDL  D/C DATE: Awaiting placement, guardianship, SW following, also currently under civil commitment and court hold through [facility] until 7/31/21.  OTHER IMPORTANT INFO: 1:1 Sitter and door alarm in place for elopement risk.

## 2021-06-05 NOTE — PLAN OF CARE
DATE & TIME: 6/4/21 9395-8715    Cognitive Concerns/ Orientation : Pt A/Ox1, oriented to self.   BEHAVIOR & AGGRESSION TOOL COLOR: Green, history of yellow   ABNL VS/O2: Daily vitals stable   MOBILITY: Independent  PAIN MANAGMENT: Denies  DIET: Regular  BOWEL/BLADDER: Continent  DRAIN/DEVICES: No IV access  SKIN: WDL  D/C DATE: Discharge pending placement and guardianship. SW following under civil commitment and court hold  OTHER IMPORTANT INFO: Door alarm in place. Sitter. Pt slept well overnight.

## 2021-06-05 NOTE — PROGRESS NOTES
"[facility]    Medicine Progress Note - Hospitalist Service       Date of Admission:  9/9/2020  Assessment & Plan       Summary: John Juárez is a 55 year old male with PMHx of schizophrenia, hx of TBI, alcohol use disorder, COPD, hyperlipidemia and hypothyroidism who was admitted on 9/9/2020 for evaluation of aggressive behavior at his group home.   Was evaluated by Psychiatry and his condition stabilized, though his group home was unwilling to take him back and thus hospitalization has been prolonged awaiting new placement and guardianship. Under civil commitment through [facility].  Noted increasingly agitated behavior and attempts at elopement in May 2021.     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  Brought to hospital for evaluation of aggressive behaviors. Group home now unwilling to take him back. Has had numerous CODE 21s called this stay. Seen by psych, meds adjusted. Is currently under civil commitment and court hold through [facility] until 7/31/21.  - Appreciate Psychiatry involvement   - Haldol increased to 10 mg TID again 5/23, because of increasing agitation.   - Benztropine 1mg BID for EPS.   - Valproic acid 500mg at bedtime.  - Door alarm, 1:1 as needed.  - SW following for placement, placement will occur once guardianship is established.  - Also per Dr. Wilks on 5/25, \"if he continues to be agitated to this degree, it may make sense to pursue adult psychiatry referral as this is a very difficult setting to manage and adjust his medications obviously, he has long-term needs in terms of placement and supervision, but efforts to assess and resolve his agitation have thus far been ineffective despite many medication changes.\"    Chronic/Stable/Resolved  Possible Tardive Dyskinesia vs EPSE  Per psych assessment on 4/1/21, noted to have possible " tardive dyskinesia vs extrapyramidal side effects of meds. At that time, recommended to increase Cogentin to 1mg BID and add vitamin E 800 international unit(s) daily.      Hyperlipidemia  Chronic and stable on low dose ASA and  atorvastatin.     COPD  Not O2 dependent. Chronic and stable on salmeterol, albuterol prn.     Hypothyroidism  Chronic and stable on levothyroxine.     Tobacco use disorder  Using nicotine patch and PRN gum.     Hx of infective endocarditis with severe mitral and aortic regurgitation S/P bioprosthetic valve replacement (10/2015)  Systolic heart murmur  HFrEF, not in acute exacerbation  Follows with Dr. Dockery of Heart and Vascular Cardiology, last seen in 1/2020. Note heart murmur on exam, strongest in aortic region. No CP, SOB, dizziness, syncope. Echocardiogram in 5/2016 with EF 50%, no evidence for prosthetic mitral and aortic valve dysfunction.   - Follow-up with outpatient Cardiology upon dismissal from the hospital (on every 2 year follow-ups).  - Continue ASA.     Hx of CVA: Note basal ganglia stroke in 2015. No focal neuro deficits aside from cognitive dysfunction as above.     Non-severe malnutrition     Diet: Room Service  Diet  Combination Diet Regular Diet Adult; Safe Tray - with utensils  Snacks/Supplements Adult: Other; Bedtime snack; Between Meals  Room Service    DVT Prophylaxis: Low Risk/Ambulatory with no VTE prophylaxis indicated  Valentin Catheter: not present  Code Status: Full Code           Disposition Plan   Expected discharge: awaiting guardianship and placement  Entered: Lamont Keys MD 06/05/2021, 8:56 AM       The patient's care was discussed with the Bedside Nurse and Patient.    Lamont Keys MD  Hospitalist Service  [facility]  Contact information available via [facility] Paging/Directory    ______________________________________________________________________    Interval History   John Juárez was seen this morning. Feels OK.  No new complaints. Wonders when he can go home.    Data reviewed today: I reviewed all medications, new labs and imaging results over the last 24 hours. I personally reviewed no images or EKG's today.    Physical Exam   Vital Signs: Temp: 98  F (36.7  C) Temp src: Oral BP: 111/80 Pulse: 60   Resp: 18 SpO2: 100 % O2 Device: None (Room air)    Weight: 185 lbs 9.6 oz  Constitutional: awake, alert, cooperative, no apparent distress, laying in bed  Respiratory: clear to auscultation bilaterally, no crackles or wheezing  Cardiovascular: regular rate and rhythm, normal S1 and S2, no murmur noted  GI: normal bowel sounds, soft, non-distended, non-tender  Skin: warm, dry  Musculoskeletal: no lower extremity pitting edema present  Neurologic: awake, alert, answered my questions appropriately    Data   Recent Labs   Lab 05/30/21  0822   CR 0.81     Medications       aspirin  81 mg Oral Daily     atorvastatin  80 mg Oral At Bedtime     benztropine  1 mg Oral BID     divalproex sodium extended-release  500 mg Oral At Bedtime     docusate sodium  100 mg Oral BID     haloperidol  10 mg Oral TID     levothyroxine  88 mcg Oral Daily     memantine  5 mg Oral Daily     risperiDONE  1.5 mg Sublingual BID     salmeterol  1 puff Inhalation BID     vitamin E  800 Units Oral Daily

## 2021-06-06 PROCEDURE — 250N000013 HC RX MED GY IP 250 OP 250 PS 637: Performed by: HOSPITALIST

## 2021-06-06 PROCEDURE — 120N000001 HC R&B MED SURG/OB

## 2021-06-06 PROCEDURE — 250N000013 HC RX MED GY IP 250 OP 250 PS 637: Performed by: INTERNAL MEDICINE

## 2021-06-06 PROCEDURE — 250N000013 HC RX MED GY IP 250 OP 250 PS 637: Performed by: PSYCHIATRY & NEUROLOGY

## 2021-06-06 PROCEDURE — 99231 SBSQ HOSP IP/OBS SF/LOW 25: CPT | Performed by: HOSPITALIST

## 2021-06-06 RX ADMIN — LEVOTHYROXINE SODIUM 88 MCG: 88 TABLET ORAL at 08:56

## 2021-06-06 RX ADMIN — MELATONIN TAB 3 MG 3 MG: 3 TAB at 21:10

## 2021-06-06 RX ADMIN — Medication 800 UNITS: at 08:57

## 2021-06-06 RX ADMIN — SALMETEROL XINAFOATE 1 PUFF: 50 POWDER, METERED ORAL; RESPIRATORY (INHALATION) at 21:09

## 2021-06-06 RX ADMIN — MEMANTINE 5 MG: 5 TABLET ORAL at 08:56

## 2021-06-06 RX ADMIN — SALMETEROL XINAFOATE 1 PUFF: 50 POWDER, METERED ORAL; RESPIRATORY (INHALATION) at 08:56

## 2021-06-06 RX ADMIN — HALOPERIDOL 10 MG: 5 TABLET ORAL at 21:09

## 2021-06-06 RX ADMIN — HALOPERIDOL 10 MG: 5 TABLET ORAL at 13:56

## 2021-06-06 RX ADMIN — BENZTROPINE MESYLATE 1 MG: 0.5 TABLET ORAL at 08:58

## 2021-06-06 RX ADMIN — DIVALPROEX SODIUM 500 MG: 500 TABLET, FILM COATED, EXTENDED RELEASE ORAL at 21:09

## 2021-06-06 RX ADMIN — HALOPERIDOL 10 MG: 5 TABLET ORAL at 08:56

## 2021-06-06 RX ADMIN — RISPERIDONE 1.5 MG: 0.5 TABLET, ORALLY DISINTEGRATING ORAL at 21:09

## 2021-06-06 RX ADMIN — ATORVASTATIN CALCIUM 80 MG: 40 TABLET, FILM COATED ORAL at 21:09

## 2021-06-06 RX ADMIN — BENZTROPINE MESYLATE 1 MG: 0.5 TABLET ORAL at 21:09

## 2021-06-06 RX ADMIN — RISPERIDONE 1.5 MG: 0.5 TABLET, ORALLY DISINTEGRATING ORAL at 08:56

## 2021-06-06 RX ADMIN — Medication 800 UNITS: at 08:56

## 2021-06-06 RX ADMIN — DOCUSATE SODIUM 100 MG: 100 CAPSULE, LIQUID FILLED ORAL at 08:57

## 2021-06-06 RX ADMIN — ASPIRIN 81 MG: 81 TABLET, COATED ORAL at 08:56

## 2021-06-06 RX ADMIN — DOCUSATE SODIUM 100 MG: 100 CAPSULE, LIQUID FILLED ORAL at 21:10

## 2021-06-06 ASSESSMENT — ACTIVITIES OF DAILY LIVING (ADL)
ADLS_ACUITY_SCORE: 13

## 2021-06-06 NOTE — PROGRESS NOTES
"[facility]    Medicine Progress Note - Hospitalist Service       Date of Admission:  9/9/2020  Assessment & Plan       Summary: John Juárez is a 55 year old male with PMHx of schizophrenia, hx of TBI, alcohol use disorder, COPD, hyperlipidemia and hypothyroidism who was admitted on 9/9/2020 for evaluation of aggressive behavior at his group home.   Was evaluated by Psychiatry and his condition stabilized, though his group home was unwilling to take him back and thus hospitalization has been prolonged awaiting new placement and guardianship. Under civil commitment through [facility].  Noted increasingly agitated behavior and attempts at elopement in May 2021.     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  Brought to hospital for evaluation of aggressive behaviors. Group home now unwilling to take him back. Has had numerous CODE 21s called this stay. Seen by psych, meds adjusted. Is currently under civil commitment and court hold through [facility] until 7/31/21.  - Appreciate Psychiatry involvement   - Haldol increased to 10 mg TID again 5/23, because of increasing agitation.   - Benztropine 1mg BID for EPS.   - Valproic acid 500mg at bedtime.  - Door alarm, 1:1 as needed.  - SW following for placement, placement will occur once guardianship is established.  - Also per Dr. Wilks on 5/25, \"if he continues to be agitated to this degree, it may make sense to pursue adult psychiatry referral as this is a very difficult setting to manage and adjust his medications obviously, he has long-term needs in terms of placement and supervision, but efforts to assess and resolve his agitation have thus far been ineffective despite many medication changes.\"    Chronic/Stable/Resolved  Possible Tardive Dyskinesia vs EPSE  Per psych assessment on 4/1/21, noted to have possible " tardive dyskinesia vs extrapyramidal side effects of meds.  - At that time, recommended to increase Cogentin to 1mg BID and add vitamin E 800 international unit(s) daily.      Hyperlipidemia  - Chronic and stable on low dose ASA and atorvastatin.     COPD  - Not O2 dependent.  - Chronic and stable on salmeterol, albuterol prn.     Hypothyroidism  - Chronic and stable on levothyroxine.     Tobacco use disorder  - Using nicotine patch and PRN gum.     Hx of infective endocarditis with severe mitral and aortic regurgitation S/P bioprosthetic valve replacement (10/2015)  Systolic heart murmur  HFrEF, not in acute exacerbation  Follows with Dr. Dockery of Heart and Vascular Cardiology, last seen in 1/2020. Note heart murmur on exam, strongest in aortic region. No CP, SOB, dizziness, syncope. Echocardiogram in 5/2016 with EF 50%, no evidence for prosthetic mitral and aortic valve dysfunction.   - Follow-up with outpatient Cardiology upon dismissal from the hospital (on every 2 year follow-ups).  - Continue ASA.     Hx of CVA: Note basal ganglia stroke in 2015. No focal neuro deficits aside from cognitive dysfunction as above.     Non-severe malnutrition     Diet: Room Service  Diet  Combination Diet Regular Diet Adult; Safe Tray - with utensils  Snacks/Supplements Adult: Other; Bedtime snack; Between Meals  Room Service    DVT Prophylaxis: Low Risk/Ambulatory with no VTE prophylaxis indicated  Valentin Catheter: not present  Code Status: Full Code           Disposition Plan   Expected discharge: awaiting guardianship and placement, currently on a   Entered: Lamont Keys MD 06/06/2021, 11:07 AM       The patient's care was discussed with the Bedside Nurse, Care Coordinator/ and Patient.    Lamont Keys MD  Hospitalist Service  [facility]  Contact information available via [facility]  [place]/y    ______________________________________________________________________    Interval History   John Juárez feels OK this morning. No new complaints/concerns. Wants to go home. Denies fevers, chest pain, shortness of breath, nausea, abdominal pain.    Data reviewed today: I reviewed all medications, new labs and imaging results over the last 24 hours. I personally reviewed no images or EKG's today.    Physical Exam   Vital Signs:                    Weight: 185 lbs 9.6 oz  Constitutional: awake, alert, cooperative, no apparent distress, laying in bed  Respiratory: clear to auscultation bilaterally, no crackles or wheezing  Cardiovascular: regular rate and rhythm, normal S1 and S2, no murmur noted  GI: normal bowel sounds, soft, non-distended, non-tender  Skin: warm, dry  Musculoskeletal: no lower extremity pitting edema present  Neurologic: awake, alert, answered questions appropriately    Data   No lab results found in last 7 days.    Medications       aspirin  81 mg Oral Daily     atorvastatin  80 mg Oral At Bedtime     benztropine  1 mg Oral BID     divalproex sodium extended-release  500 mg Oral At Bedtime     docusate sodium  100 mg Oral BID     haloperidol  10 mg Oral TID     levothyroxine  88 mcg Oral Daily     memantine  5 mg Oral Daily     risperiDONE  1.5 mg Sublingual BID     salmeterol  1 puff Inhalation BID     vitamin E  800 Units Oral Daily

## 2021-06-06 NOTE — PLAN OF CARE
DATE & TIME: 6/6/21 6518-7172  Cognitive Concerns/ Orientation : Oriented to self only, disoriented to place, time, situation  BEHAVIOR & AGGRESSION TOOL COLOR: Green  ABNL VS/O2: q24h vitals completed during AM shift. VSS.   MOBILITY: up independently in room, SBA in hallways, steady  PAIN MANAGMENT: Denies  DIET: Regular  BOWEL/BLADDER: Continent  DRAIN/DEVICES: No IV access  SKIN: WDL  D/C DATE: Awaiting placement, guardianship, SW following, also currently under civil commitment and court hold through [facility] until 7/31/21.  OTHER IMPORTANT INFO: 1:1 Sitter and door alarm in place for elopement risk.

## 2021-06-06 NOTE — PLAN OF CARE
DATE & TIME: 6/5/21 1590-9645    Cognitive Concerns/ Orientation : Pt A/Ox1, oriented to self   BEHAVIOR & AGGRESSION TOOL COLOR: Green, hx of yellow   ABNL VS/O2: VS q24h, VSS on day shift  MOBILITY: Independent in room, SBA with hallway  PAIN MANAGMENT: Denies  DIET: Regular  BOWEL/BLADDER: Continent  DRAIN/DEVICES: No IV access  SKIN: WDL  D/C DATE: Discharge pending placement and guardianship. Pt under civil commitment and court hold through [facility] until 7/31/21  OTHER IMPORTANT INFO: Pt calm and cooperative this shift. Sitter and door alarm for elopement risk. Pt slept well overnight.

## 2021-06-07 PROCEDURE — 250N000013 HC RX MED GY IP 250 OP 250 PS 637: Performed by: PSYCHIATRY & NEUROLOGY

## 2021-06-07 PROCEDURE — 250N000013 HC RX MED GY IP 250 OP 250 PS 637: Performed by: INTERNAL MEDICINE

## 2021-06-07 PROCEDURE — 120N000001 HC R&B MED SURG/OB

## 2021-06-07 PROCEDURE — 250N000013 HC RX MED GY IP 250 OP 250 PS 637: Performed by: HOSPITALIST

## 2021-06-07 PROCEDURE — 99231 SBSQ HOSP IP/OBS SF/LOW 25: CPT | Performed by: HOSPITALIST

## 2021-06-07 RX ADMIN — HALOPERIDOL 10 MG: 5 TABLET ORAL at 13:04

## 2021-06-07 RX ADMIN — POLYETHYLENE GLYCOL 400 AND PROPYLENE GLYCOL 1 DROP: 4; 3 SOLUTION/ DROPS OPHTHALMIC at 09:10

## 2021-06-07 RX ADMIN — ASPIRIN 81 MG: 81 TABLET, COATED ORAL at 09:09

## 2021-06-07 RX ADMIN — MEMANTINE 5 MG: 5 TABLET ORAL at 09:09

## 2021-06-07 RX ADMIN — HALOPERIDOL 10 MG: 5 TABLET ORAL at 09:08

## 2021-06-07 RX ADMIN — SALMETEROL XINAFOATE 1 PUFF: 50 POWDER, METERED ORAL; RESPIRATORY (INHALATION) at 22:26

## 2021-06-07 RX ADMIN — MELATONIN TAB 3 MG 3 MG: 3 TAB at 22:22

## 2021-06-07 RX ADMIN — LEVOTHYROXINE SODIUM 88 MCG: 88 TABLET ORAL at 09:09

## 2021-06-07 RX ADMIN — HALOPERIDOL 10 MG: 5 TABLET ORAL at 22:21

## 2021-06-07 RX ADMIN — LORAZEPAM 1 MG: 1 TABLET ORAL at 15:37

## 2021-06-07 RX ADMIN — BENZTROPINE MESYLATE 1 MG: 0.5 TABLET ORAL at 09:09

## 2021-06-07 RX ADMIN — DIVALPROEX SODIUM 500 MG: 500 TABLET, FILM COATED, EXTENDED RELEASE ORAL at 22:23

## 2021-06-07 RX ADMIN — Medication 800 UNITS: at 09:09

## 2021-06-07 RX ADMIN — ATORVASTATIN CALCIUM 80 MG: 40 TABLET, FILM COATED ORAL at 22:24

## 2021-06-07 RX ADMIN — DOCUSATE SODIUM 100 MG: 100 CAPSULE, LIQUID FILLED ORAL at 22:24

## 2021-06-07 RX ADMIN — RISPERIDONE 1.5 MG: 0.5 TABLET, ORALLY DISINTEGRATING ORAL at 09:08

## 2021-06-07 RX ADMIN — DOCUSATE SODIUM 100 MG: 100 CAPSULE, LIQUID FILLED ORAL at 09:09

## 2021-06-07 RX ADMIN — RISPERIDONE 1.5 MG: 0.5 TABLET, ORALLY DISINTEGRATING ORAL at 22:21

## 2021-06-07 RX ADMIN — SALMETEROL XINAFOATE 1 PUFF: 50 POWDER, METERED ORAL; RESPIRATORY (INHALATION) at 09:09

## 2021-06-07 ASSESSMENT — ACTIVITIES OF DAILY LIVING (ADL)
ADLS_ACUITY_SCORE: 13

## 2021-06-07 NOTE — PLAN OF CARE
DATE & TIME: 6/6/21 1900-2300    Cognitive Concerns/ Orientation : Pt A/Ox1, oriented to self   BEHAVIOR & AGGRESSION TOOL COLOR: Green   ABNL VS/O2: VSS on RA, q24h vitals  MOBILITY: Independent  PAIN MANAGMENT: Denies  DIET: Regular  BOWEL/BLADDER: Continent  DRAIN/DEVICES: No IV access  SKIN: WDL  D/C DATE: Discharge pending guardianship and placement. Civil commitment and court hold through [facility] until 7/31/21  OTHER IMPORTANT INFO: Sitter and door alarm for elopement risk

## 2021-06-07 NOTE — PROGRESS NOTES
"[facility]    Medicine Progress Note - Hospitalist Service       Date of Admission:  9/9/2020  Assessment & Plan             Summary: John Juárez is a 55 year old male with PMHx of schizophrenia, hx of TBI, alcohol use disorder, COPD, hyperlipidemia and hypothyroidism who was admitted on 9/9/2020 for evaluation of aggressive behavior at his group home.   Was evaluated by Psychiatry and his condition stabilized, though his group home was unwilling to take him back and thus hospitalization has been prolonged awaiting new placement and guardianship. Under civil commitment through [facility].  Noted increasingly agitated behavior and attempts at elopement in May 2021.     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  Brought to hospital for evaluation of aggressive behaviors. Group home now unwilling to take him back. Has had numerous CODE 21s called this stay. Seen by psych, meds adjusted. Is currently under civil commitment and court hold through [facility] until 7/31/21.  - Appreciate Psychiatry involvement   - Haldol increased to 10 mg TID again 5/23, because of increasing agitation.   - Benztropine 1mg BID for EPS.   - Valproic acid 500mg at bedtime.  - Door alarm, 1:1 as needed.  - SW following for placement, placement will occur once guardianship is established.  - Also per Dr. Wilks on 5/25, \"if he continues to be agitated to this degree, it may make sense to pursue adult psychiatry referral as this is a very difficult setting to manage and adjust his medications obviously, he has long-term needs in terms of placement and supervision, but efforts to assess and resolve his agitation have thus far been ineffective despite many medication changes.\"    Chronic/Stable/Resolved  Possible Tardive Dyskinesia vs EPSE  Per psych assessment on 4/1/21, noted to have " possible tardive dyskinesia vs extrapyramidal side effects of meds.  - At that time, recommended to increase Cogentin to 1mg BID and add vitamin E 800 international unit(s) daily.      Hyperlipidemia  - Chronic and stable on low dose ASA and atorvastatin.     COPD  - Not O2 dependent.  - Chronic and stable on salmeterol, albuterol prn.     Hypothyroidism  - Chronic and stable on levothyroxine.     Tobacco use disorder  - Using nicotine patch and PRN gum.     Hx of infective endocarditis with severe mitral and aortic regurgitation S/P bioprosthetic valve replacement (10/2015)  Systolic heart murmur  HFrEF, not in acute exacerbation  Follows with Dr. Dockery of Heart and Vascular Cardiology, last seen in 1/2020. Note heart murmur on exam, strongest in aortic region. No CP, SOB, dizziness, syncope. Echocardiogram in 5/2016 with EF 50%, no evidence for prosthetic mitral and aortic valve dysfunction.   - Follow-up with outpatient Cardiology upon dismissal from the hospital (on every 2 year follow-ups).  - Continue ASA.     Hx of CVA  - Note basal ganglia stroke in 2015. No focal neuro deficits aside from cognitive dysfunction as above.     Non-severe malnutrition  - Dietitian consulted.     Diet: Room Service  Diet  Combination Diet Regular Diet Adult; Safe Tray - with utensils  Snacks/Supplements Adult: Other; Bedtime snack; Between Meals  Room Service    DVT Prophylaxis: Low Risk/Ambulatory with no VTE prophylaxis indicated  Valentin Catheter: not present  Code Status: Full Code           Disposition Plan   Expected discharge: awaiting guardianship and placement, currently under a civil commitment through the end of July  Entered: Lamont Keys MD 06/07/2021, 11:45 AM       The patient's care was discussed with the Bedside Nurse, Care Coordinator/ and Patient.    Lamont Keys MD  Hospitalist Service  [facility]  Contact information available via [facility]  Naomi[place]/y    ______________________________________________________________________    Interval History   John Juárez feels OK this morning. No new complaints/concerns. Denies fevers, chest pain, shortness of breath, nausea.    Data reviewed today: I reviewed all medications, new labs and imaging results over the last 24 hours. I personally reviewed no images or EKG's today.    Physical Exam   Vital Signs: Temp: 97.6  F (36.4  C) Temp src: Oral BP: 110/75 Pulse: 71   Resp: 18 SpO2: 96 % O2 Device: None (Room air)    Weight: 185 lbs 9.6 oz  Constitutional: awake, alert, cooperative, no apparent distress, sitting at the edge of the bed playing a game with staff  Respiratory: clear to auscultation bilaterally, no crackles or wheezing  Cardiovascular: regular rate and rhythm, normal S1 and S2, no murmur noted    Data   No lab results found in last 7 days.    Medications       aspirin  81 mg Oral Daily     atorvastatin  80 mg Oral At Bedtime     benztropine  1 mg Oral BID     divalproex sodium extended-release  500 mg Oral At Bedtime     docusate sodium  100 mg Oral BID     haloperidol  10 mg Oral TID     levothyroxine  88 mcg Oral Daily     memantine  5 mg Oral Daily     risperiDONE  1.5 mg Sublingual BID     salmeterol  1 puff Inhalation BID     vitamin E  800 Units Oral Daily

## 2021-06-07 NOTE — PLAN OF CARE
"DATE & TIME: 6/7/2021 6725-7779    Cognitive Concerns/ Orientation : Pt A/Ox1, oriented to self   BEHAVIOR & AGGRESSION TOOL COLOR: Green to yellow. Several times came out of room and asking for his money and slammed the door. Redirectable. Found talking to himself in room and punching in air. Scheduled haldol given at 1300 (scheduled at 1400). 1600: needed to given PRN ativan due to pt continue the behavior and stating having \"too much voice in my head\". Ativan effective. Pt was resting in bed when reassessed.   ABNL VS/O2: VSS on RA, q24h vitals  MOBILITY: Independent, steady, SBA in hallway.   PAIN MANAGMENT: Denies  DIET: Regular  BOWEL/BLADDER: Continent  DRAIN/DEVICES: No IV access  SKIN: WDL  D/C DATE: Discharge pending guardianship and placement. Civil commitment and court hold through [facility] until 7/31/21  OTHER IMPORTANT INFO: Sitter and door alarm for elopement risk      Lynda,  from [facility] spoke with pt for pre petition screening. Lynda stated that she did not need to speak with our  as she had been contacting with pt's county .   "

## 2021-06-07 NOTE — PLAN OF CARE
DATE & TIME: 6/6/21 7106-8767    Cognitive Concerns/ Orientation : Pt A/Ox1, oriented to self   BEHAVIOR & AGGRESSION TOOL COLOR: Green   ABNL VS/O2: VSS on RA, q24h vitals  MOBILITY: Independent  PAIN MANAGMENT: Denies  DIET: Regular  BOWEL/BLADDER: Continent  DRAIN/DEVICES: No IV access  SKIN: WDL  D/C DATE: Discharge pending guardianship and placement. Civil commitment and court hold through [facility] until 7/31/21  OTHER IMPORTANT INFO: door alarm for elopement risk.

## 2021-06-08 PROCEDURE — 99232 SBSQ HOSP IP/OBS MODERATE 35: CPT | Performed by: HOSPITALIST

## 2021-06-08 PROCEDURE — 120N000001 HC R&B MED SURG/OB

## 2021-06-08 PROCEDURE — 250N000013 HC RX MED GY IP 250 OP 250 PS 637: Performed by: INTERNAL MEDICINE

## 2021-06-08 PROCEDURE — 250N000013 HC RX MED GY IP 250 OP 250 PS 637: Performed by: HOSPITALIST

## 2021-06-08 PROCEDURE — 250N000013 HC RX MED GY IP 250 OP 250 PS 637: Performed by: PSYCHIATRY & NEUROLOGY

## 2021-06-08 PROCEDURE — 99232 SBSQ HOSP IP/OBS MODERATE 35: CPT | Performed by: PSYCHIATRY & NEUROLOGY

## 2021-06-08 RX ADMIN — NICOTINE POLACRILEX 2 MG: 2 LOZENGE ORAL at 11:18

## 2021-06-08 RX ADMIN — LEVOTHYROXINE SODIUM 88 MCG: 88 TABLET ORAL at 08:07

## 2021-06-08 RX ADMIN — HALOPERIDOL 10 MG: 5 TABLET ORAL at 14:27

## 2021-06-08 RX ADMIN — DOCUSATE SODIUM 100 MG: 100 CAPSULE, LIQUID FILLED ORAL at 21:20

## 2021-06-08 RX ADMIN — RISPERIDONE 1 MG: 1 TABLET, ORALLY DISINTEGRATING ORAL at 11:16

## 2021-06-08 RX ADMIN — RISPERIDONE 1.5 MG: 0.5 TABLET, ORALLY DISINTEGRATING ORAL at 21:17

## 2021-06-08 RX ADMIN — DOCUSATE SODIUM 100 MG: 100 CAPSULE, LIQUID FILLED ORAL at 08:07

## 2021-06-08 RX ADMIN — SALMETEROL XINAFOATE 1 PUFF: 50 POWDER, METERED ORAL; RESPIRATORY (INHALATION) at 21:26

## 2021-06-08 RX ADMIN — SALMETEROL XINAFOATE 1 PUFF: 50 POWDER, METERED ORAL; RESPIRATORY (INHALATION) at 08:06

## 2021-06-08 RX ADMIN — ASPIRIN 81 MG: 81 TABLET, COATED ORAL at 08:07

## 2021-06-08 RX ADMIN — HALOPERIDOL 10 MG: 5 TABLET ORAL at 21:17

## 2021-06-08 RX ADMIN — RISPERIDONE 1.5 MG: 0.5 TABLET, ORALLY DISINTEGRATING ORAL at 08:07

## 2021-06-08 RX ADMIN — HALOPERIDOL 10 MG: 5 TABLET ORAL at 08:07

## 2021-06-08 RX ADMIN — ATORVASTATIN CALCIUM 80 MG: 40 TABLET, FILM COATED ORAL at 21:21

## 2021-06-08 RX ADMIN — MELATONIN TAB 3 MG 3 MG: 3 TAB at 21:17

## 2021-06-08 RX ADMIN — MEMANTINE 5 MG: 5 TABLET ORAL at 08:08

## 2021-06-08 RX ADMIN — BENZTROPINE MESYLATE 1 MG: 0.5 TABLET ORAL at 21:20

## 2021-06-08 RX ADMIN — DIVALPROEX SODIUM 500 MG: 500 TABLET, FILM COATED, EXTENDED RELEASE ORAL at 21:20

## 2021-06-08 RX ADMIN — Medication 800 UNITS: at 08:07

## 2021-06-08 RX ADMIN — BENZTROPINE MESYLATE 1 MG: 0.5 TABLET ORAL at 08:07

## 2021-06-08 ASSESSMENT — ACTIVITIES OF DAILY LIVING (ADL)
ADLS_ACUITY_SCORE: 15
ADLS_ACUITY_SCORE: 13
ADLS_ACUITY_SCORE: 15

## 2021-06-08 NOTE — PROGRESS NOTES
"[facility]    Medicine Progress Note - Hospitalist Service       Date of Admission:  9/9/2020  Assessment & Plan       Summary: John Juárez is a 55 year old male with PMHx of schizophrenia, hx of TBI, alcohol use disorder, COPD, hyperlipidemia and hypothyroidism who was admitted on 9/9/2020 for evaluation of aggressive behavior at his group home.   Was evaluated by Psychiatry and his condition stabilized, though his group home was unwilling to take him back and thus hospitalization has been prolonged awaiting new placement and guardianship. Under civil commitment through [facility].  Noted increasingly agitated behavior and attempts at elopement in May 2021.     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  Brought to hospital for evaluation of aggressive behaviors. Group home now unwilling to take him back. Has had numerous CODE 21s called this stay. Seen by psych, meds adjusted. Is currently under civil commitment and court hold through [facility] until 7/31/21.  - Appreciate Psychiatry involvement   - Haldol increased to 10 mg TID again 5/23, because of increasing agitation.   - Benztropine 1mg BID for EPS.   - Valproic acid 500mg at bedtime.  - Door alarm, 1:1 as needed.  - SW following for placement, placement will occur once guardianship is established.  - Also per Dr. Wilks on 5/25, \"if he continues to be agitated to this degree, it may make sense to pursue adult psychiatry referral as this is a very difficult setting to manage and adjust his medications obviously, he has long-term needs in terms of placement and supervision, but efforts to assess and resolve his agitation have thus far been ineffective despite many medication changes.\"  - 6/8/21 - consulted psychiatry again due to ongoing aggressive behaviors/hallucinations/delusions. See Dr. Murrieta's note " from 6/8/21, appreciate his assistance.    Chronic/Stable/Resolved  Possible Tardive Dyskinesia vs EPSE  Per psych assessment on 4/1/21, noted to have possible tardive dyskinesia vs extrapyramidal side effects of meds.  - At that time, recommended to increase Cogentin to 1mg BID and add vitamin E 800 international unit(s) daily.      Hyperlipidemia  - Chronic and stable on low dose ASA and atorvastatin.     COPD  - Not O2 dependent.  - Chronic and stable on salmeterol, albuterol prn.     Hypothyroidism  - Chronic and stable on levothyroxine.     Tobacco use disorder  - Using nicotine patch and PRN gum.     Hx of infective endocarditis with severe mitral and aortic regurgitation S/P bioprosthetic valve replacement (10/2015)  Systolic heart murmur  HFrEF, not in acute exacerbation  Follows with Dr. Dockery of Heart and Vascular Cardiology, last seen in 1/2020. Note heart murmur on exam, strongest in aortic region. No CP, SOB, dizziness, syncope. Echocardiogram in 5/2016 with EF 50%, no evidence for prosthetic mitral and aortic valve dysfunction.   - Follow-up with outpatient Cardiology upon dismissal from the hospital (on every 2 year follow-ups).  - Continue ASA.     Hx of CVA  - Note basal ganglia stroke in 2015. No focal neuro deficits aside from cognitive dysfunction as above.     Non-severe malnutrition  - Dietitian consulted.     Diet: Room Service  Diet  Combination Diet Regular Diet Adult; Safe Tray - with utensils  Snacks/Supplements Adult: Other; Bedtime snack; Between Meals  Room Service    DVT Prophylaxis: Low Risk/Ambulatory with no VTE prophylaxis indicated  Valentin Catheter: not present  Code Status: Full Code           Disposition Plan   Expected discharge: awaiting guardianship and placement, currently under a civil commitment through the end of July  Entered: Lamont Keys MD 06/08/2021, 12:01 PM       The patient's care was discussed with the Bedside Nurse and Patient.    Lamont Keys  MD  Hospitalist Service    Contact information available via [facility] Paging/Directory    ______________________________________________________________________    Interval History   John Juárez feels OK today. No new complaints/concerns. Denies fevers, chest pain, shortness of breath, nausea, abdominal pain. Wants to know when he can go home. Discussed with nursing. Patient still having intermittent posturing/threatening behaviors. Asked psychiatry to see him again today, appreciate their assistance.    Data reviewed today: I reviewed all medications, new labs and imaging results over the last 24 hours. I personally reviewed no images or EKG's today.    Physical Exam   Vital Signs: Temp: 97.6  F (36.4  C) Temp src: Oral BP: 92/63 Pulse: 78   Resp: 18 SpO2: 94 % O2 Device: None (Room air)    Weight: 185 lbs 9.6 oz  Constitutional: awake, alert, cooperative, no apparent distress, laying in the hospital bed  Respiratory: clear to auscultation bilaterally, no crackles or wheezing  Cardiovascular: regular rate and rhythm, normal S1 and S2, no murmur noted  GI: normal bowel sounds, soft, non-distended, non-tender  Skin: warm, dry  Musculoskeletal: no lower extremity pitting edema present  Neurologic: awake, alert, answers some questions appropriately    Data   No lab results found in last 7 days.    Medications       aspirin  81 mg Oral Daily     atorvastatin  80 mg Oral At Bedtime     benztropine  1 mg Oral BID     divalproex sodium extended-release  500 mg Oral At Bedtime     docusate sodium  100 mg Oral BID     haloperidol  10 mg Oral TID     levothyroxine  88 mcg Oral Daily     memantine  5 mg Oral Daily     risperiDONE  1.5 mg Sublingual BID     salmeterol  1 puff Inhalation BID     vitamin E  800 Units Oral Daily

## 2021-06-08 NOTE — CONSULTS
[facility] Psychiatric Consult Progress Note    Interval History:   I met with the patient on station 66.  Routine follow-up was requested with respect to still having interimttent agitated episodes. Nursing describes he tyipcally will posture initially with behaviors such as pounding a fist into his hand or walls. Slamming doors, raising voice, etc. Then if not given PRNs in time this can escalate to even more severe agitation within the next few hours. At times giving medications perceived as concerning by some nursing staff given he can become sedated afterwards. On my interview he is reporting feeling fine. Reports mood is good. Denies feeling anxious. Reports he slept well. Intact appetite. States he has good energy, denies any difficulty walking or with balance. Denies SI/HI. Denies hallucinations or paranoia. He gets frustrated when I bring up agitation behaviors such as pounding ones fist, histting the wall, slamming doors. Reviewed importance of avoiding those behaviors in hopes it eventually leads to discharge if he can sustain better behavior. He was frustrated by this noting he wants to discharge today     10 point Review of Systems completed by Lio Murrieta MD , and is  is negative other than noted in the HPI     Medications:       aspirin  81 mg Oral Daily     atorvastatin  80 mg Oral At Bedtime     benztropine  1 mg Oral BID     divalproex sodium extended-release  500 mg Oral At Bedtime     docusate sodium  100 mg Oral BID     haloperidol  10 mg Oral TID     levothyroxine  88 mcg Oral Daily     memantine  5 mg Oral Daily     risperiDONE  1.5 mg Sublingual BID     salmeterol  1 puff Inhalation BID     vitamin E  800 Units Oral Daily     acetaminophen, albuterol, alum & mag hydroxide-simethicone, haloperidol lactate, LORazepam, melatonin, naloxone **OR** naloxone **OR** naloxone **OR** naloxone, nicotine, nicotine, ondansetron **OR** ondansetron, polyethylene glycol, polyethylene  "glycol-propylene glycol, risperiDONE    Mental Status Examination:   Appearance: He appears fatigued, lying on his right side, eyes closed, he awakens, mumbles his responses for the most part, gets a bit irritable when we discuss his behaviors and lack of discharge plan  Eye Contact: He seems to stare blankly at times when he does open his eyes, intermittently closing eyes during my visit   speech:  Impoverished, dysarthric, monotone, speaks in a whisper, slightly louder when he gets frustrated  language: Poor  Psychomotor Behavior:  some buccal-lingual dyskinesia, chewing movements  Mood: \"good.\"  affect: Blunted   thought Process:  paucity of speech, no obvious loosening of associations flight of ideas or formal thought disorder  Thought Content:  no evidence of suicidal ideation or homicidal ideation, but at times he does tend to stare off into space, looks a guarded, but pleasant  Oriented to: x1  Recent and Remote Memory:  limited  Fund of Knowledge: delayed  Insight:  limited  Judgment:  limited       Labs/Vitals:     No results found for this or any previous visit (from the past 24 hour(s)).  B/P: 123/95, T: 97.6, P: 89, R: 18  Impression  John is a 55-year-old gentleman with a known major neurocognitive disorder and schizophrenic spectrum illness.  He continues to show significant impairment, his Depakote level was elevated last week so was reduced to 500 mg HS. Ammonia was slightly elevated as well. He does become intermittently agitated, likely fueld by his confusion and desire to leave the hospital. Postures with behaviors and then can escalate to severe behavioral issues. Nursing documented recently that he responded well to PRNs when provided.    1.  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   2.  Other schizophrenia spectrum disorder.   3.  Alcohol use disorder in remission.   4.  Stimulant use disorder, in remission.   5.  Chronic obstructive pulmonary " disease.   6.  Hypertension.   7. R/O mild TD vs EPSE       Plan:   1.  Depakote ER to 500 mg nightly (erduced last week, level now at 77)  2.  We will repeat a Depakote level in the next 3 days   3.  Continue with door alarm, he is under commitment, Dr. Wilks filled out paperwork to extend his commitment and his Yanez request for Haldol, Risperdal, Zyprexa, and Invega   4.  Still working on community placement, efforts to move him into a locked psychiatric setting have thus far been unsuccessful given the complexity of his case  5.  Escalating behaviors such as pounding fist into open hand, hitting walls, aggression with voice/yelling, etc are indication of substantial agitation risk and thus warrant PRNs administration. I understand he is actually open to taking his PRNs orally during these episodes so we can continue the standing PRNs as already available for this. Document if any over-sedation occurs and perhaps with time we can try to optimize the PRN options/doses based on clinical response. For instance, we could try an approach of giving him just 1 mg Ativan PO for posturing/escalating behavior and then document clinical effect and tolerability. If too sedating or worses gait too substantially we could try reducing the odse to 0.5 mg for the next instance and/or try the haldol PRN by itself the next time and documenting response in a similar manner.   Attestation:   Patient has been seen and evaluated by me,  Lio Murrieta MD    Visit/Communication Style   In person, face-to-face

## 2021-06-08 NOTE — PLAN OF CARE
DATE & TIME: 6/7/2021 1603-4267                 Cognitive Concerns/ Orientation : Pt A/Ox1, oriented to self   BEHAVIOR & AGGRESSION TOOL COLOR: Green to yellow. Previous day, several times came out of room and asking for his money and slammed the door during the day. Redirectable. Has been quiet in his room this overnight.          ABNL VS/O2: VSS on RA, q24h vitals  MOBILITY: Independent, SBA in hallway. Gait has been unsteady when out of bed.  PAIN MANAGMENT: Denies  DIET: Regular  BOWEL/BLADDER: Continent  DRAIN/DEVICES: No IV access  SKIN: WDL  D/C DATE: Discharge pending guardianship and placement. Civil commitment and court hold through [facility] until 7/31/21  OTHER IMPORTANT INFO: Sitter and door alarm for elopement risk        Lynda,  from [facility] spoke with pt for pre petition screening. Lynda stated that she did not need to speak with our  as she had been contacting with pt's [facility] .

## 2021-06-08 NOTE — PLAN OF CARE
"DATE & TIME: 6/8/2021 5254-6025           Cognitive Concerns/ Orientation : Pt A/Ox1, oriented to self   BEHAVIOR & AGGRESSION TOOL COLOR: Green to yellow. Found having audible and visual hallucination (seeing and talking to ). Pt was informed that there was no one there, he got upset and told writer to \"fuck off\". Sitter observed pt to punch into open hand, pacing in room, wanting to go outside to smoke. PRN Risperidone given once around 1100. Seen by psych today, no med adjustment made.         ABNL VS/O2: VSS on RA, q24h vitals  MOBILITY: SBA with GB, slightly unsteady on feet.   PAIN MANAGMENT: Denies  DIET: Regular  BOWEL/BLADDER: Continent  DRAIN/DEVICES: No IV access  SKIN: WDL  D/C DATE: Discharge pending guardianship and placement. Civil commitment and court hold through [facility] until 7/31/21. Psych had filled paperwork to extend commitment.   OTHER IMPORTANT INFO: Sitter and door alarm for elopement risk        5/7/2021: Lynda,  from [facility] spoke with pt for pre petition screening. Lynda stated that she did not need to speak with our  as she had been contacting with pt's county .   "

## 2021-06-09 PROCEDURE — 250N000013 HC RX MED GY IP 250 OP 250 PS 637: Performed by: HOSPITALIST

## 2021-06-09 PROCEDURE — 87635 SARS-COV-2 COVID-19 AMP PRB: CPT | Performed by: HOSPITALIST

## 2021-06-09 PROCEDURE — 250N000013 HC RX MED GY IP 250 OP 250 PS 637: Performed by: INTERNAL MEDICINE

## 2021-06-09 PROCEDURE — 120N000001 HC R&B MED SURG/OB

## 2021-06-09 PROCEDURE — 250N000013 HC RX MED GY IP 250 OP 250 PS 637: Performed by: PSYCHIATRY & NEUROLOGY

## 2021-06-09 PROCEDURE — 99233 SBSQ HOSP IP/OBS HIGH 50: CPT | Performed by: PSYCHIATRY & NEUROLOGY

## 2021-06-09 PROCEDURE — 99231 SBSQ HOSP IP/OBS SF/LOW 25: CPT | Performed by: HOSPITALIST

## 2021-06-09 RX ORDER — RISPERIDONE 1 MG/1
2 TABLET, ORALLY DISINTEGRATING ORAL 2 TIMES DAILY
Status: DISCONTINUED | OUTPATIENT
Start: 2021-06-09 | End: 2021-06-15

## 2021-06-09 RX ADMIN — HALOPERIDOL 10 MG: 5 TABLET ORAL at 21:03

## 2021-06-09 RX ADMIN — RISPERIDONE 1 MG: 1 TABLET, ORALLY DISINTEGRATING ORAL at 09:44

## 2021-06-09 RX ADMIN — ASPIRIN 81 MG: 81 TABLET, COATED ORAL at 08:19

## 2021-06-09 RX ADMIN — BENZTROPINE MESYLATE 1 MG: 0.5 TABLET ORAL at 21:04

## 2021-06-09 RX ADMIN — SALMETEROL XINAFOATE 1 PUFF: 50 POWDER, METERED ORAL; RESPIRATORY (INHALATION) at 21:04

## 2021-06-09 RX ADMIN — SALMETEROL XINAFOATE 1 PUFF: 50 POWDER, METERED ORAL; RESPIRATORY (INHALATION) at 08:19

## 2021-06-09 RX ADMIN — LORAZEPAM 1 MG: 1 TABLET ORAL at 11:57

## 2021-06-09 RX ADMIN — HALOPERIDOL 10 MG: 5 TABLET ORAL at 14:14

## 2021-06-09 RX ADMIN — RISPERIDONE 2 MG: 1 TABLET, ORALLY DISINTEGRATING ORAL at 08:19

## 2021-06-09 RX ADMIN — DOCUSATE SODIUM 100 MG: 100 CAPSULE, LIQUID FILLED ORAL at 21:04

## 2021-06-09 RX ADMIN — NICOTINE POLACRILEX 2 MG: 2 LOZENGE ORAL at 09:44

## 2021-06-09 RX ADMIN — ATORVASTATIN CALCIUM 80 MG: 40 TABLET, FILM COATED ORAL at 21:04

## 2021-06-09 RX ADMIN — RISPERIDONE 2 MG: 1 TABLET, ORALLY DISINTEGRATING ORAL at 21:04

## 2021-06-09 RX ADMIN — BENZTROPINE MESYLATE 1 MG: 0.5 TABLET ORAL at 08:19

## 2021-06-09 RX ADMIN — LORAZEPAM 1 MG: 1 TABLET ORAL at 16:47

## 2021-06-09 RX ADMIN — DOCUSATE SODIUM 100 MG: 100 CAPSULE, LIQUID FILLED ORAL at 08:19

## 2021-06-09 RX ADMIN — MELATONIN TAB 3 MG 3 MG: 3 TAB at 21:04

## 2021-06-09 RX ADMIN — LEVOTHYROXINE SODIUM 88 MCG: 88 TABLET ORAL at 08:19

## 2021-06-09 RX ADMIN — DIVALPROEX SODIUM 500 MG: 500 TABLET, FILM COATED, EXTENDED RELEASE ORAL at 21:04

## 2021-06-09 RX ADMIN — HALOPERIDOL 10 MG: 5 TABLET ORAL at 08:19

## 2021-06-09 RX ADMIN — MEMANTINE 5 MG: 5 TABLET ORAL at 08:19

## 2021-06-09 RX ADMIN — Medication 800 UNITS: at 08:19

## 2021-06-09 ASSESSMENT — ACTIVITIES OF DAILY LIVING (ADL)
ADLS_ACUITY_SCORE: 15
ADLS_ACUITY_SCORE: 13
ADLS_ACUITY_SCORE: 15
ADLS_ACUITY_SCORE: 13
ADLS_ACUITY_SCORE: 15
ADLS_ACUITY_SCORE: 13

## 2021-06-09 NOTE — PLAN OF CARE
DATE & TIME: 6/9/2021 5951-9711     Cognitive Concerns/ Orientation : Pt A/Ox1, oriented to self   BEHAVIOR & AGGRESSION TOOL COLOR: Green to red. PRN Risperidone and ativan given for behaviors, such as yelling, punching into open hand, whipping gait belt (not at staff)--see detailed note from sitter.  Seen by psych again today, pursuing inpatient psych unit.  following.   ABNL VS/O2: VSS on RA, q24h vitals  MOBILITY: SBA with GB, slightly unsteady on feet.   PAIN MANAGMENT: Denies  DIET: Regular  BOWEL/BLADDER: Continent  DRAIN/DEVICES: No IV access  SKIN: WDL, showered.   D/C DATE: Discharge pending guardianship and placement. Civil commitment and court hold through [facility] until 7/31/21. Psych had filled paperwork to extend commitment.   OTHER IMPORTANT INFO: Sitter and door alarm for elopement risk

## 2021-06-09 NOTE — CONSULTS
"[facility] Psychiatric Consult Progress Note    Interval History:   I met with the patient on station 66.  I spoke with the  and the nursing staff.  John continues to have episodes of agitation, this morning when I came into the room he was rambling on about having property in [place] and [place].  He asks when he can be discharged telling me \"I can live with my sister\".  He continues to demonstrate poor insight and periods of combative behavior that have been refractory to medication adjustment thus far.  I spoke with the  about whether we should extend his Yanez to include \"any neuroleptic\".  I left a message for Dr. Sorenson to discuss the possibility of getting him transferred to psychiatry.  Efforts to move him into a psychiatric setting have been met with much resistance, and trying to manage him on the medical floor has been very challenging to say the least.  The guardianship issue is going to take time, but I believe he would be better served in a mental health setting which is more secure and allows appropriate observation/treatment.    10 point Review of Systems completed by Sushil Wilks MD , and is  is negative other than noted in the HPI     Medications:       aspirin  81 mg Oral Daily     atorvastatin  80 mg Oral At Bedtime     benztropine  1 mg Oral BID     divalproex sodium extended-release  500 mg Oral At Bedtime     docusate sodium  100 mg Oral BID     haloperidol  10 mg Oral TID     levothyroxine  88 mcg Oral Daily     memantine  5 mg Oral Daily     risperiDONE  1.5 mg Sublingual BID     salmeterol  1 puff Inhalation BID     vitamin E  800 Units Oral Daily     acetaminophen, albuterol, alum & mag hydroxide-simethicone, haloperidol lactate, LORazepam, melatonin, naloxone **OR** naloxone **OR** naloxone **OR** naloxone, nicotine, nicotine, ondansetron **OR** ondansetron, polyethylene glycol, polyethylene glycol-propylene glycol, " "risperiDONE    Mental Status Examination:   Appearance: He appears fatigued, lying on his right side, eyes closed, he awakens, mumbles his responses for the most part, gets a bit irritable when we discuss his behaviors and lack of discharge plan  Eye Contact: He seems to stare blankly at times when he does open his eyes, intermittently closing eyes during my visit   speech:  Impoverished, dysarthric, monotone, speaks in a whisper, slightly louder when he gets frustrated  language: Poor  Psychomotor Behavior:  some buccal-lingual dyskinesia, chewing movements  Mood: \"fine\"  affect: Blunted   thought Process:  paucity of speech, no obvious loosening of associations flight of ideas or formal thought disorder  Thought Content:  no evidence of suicidal ideation or homicidal ideation, but he appears paranoid, making delusional statements about owning property in [place] and [place].    Oriented to: x1  Recent and Remote Memory:  limited  Fund of Knowledge: delayed  Insight:  limited  Judgment:  limited       Labs/Vitals:     No results found for this or any previous visit (from the past 24 hour(s)).  B/P: 123/95, T: 97.6, P: 89, R: 18  Impression  John is a 55-year-old gentleman with a known major neurocognitive disorder and schizophrenic spectrum illness.  He continues to show significant impairment, poor insight, thus far not responding predictably to medications.  He might be a candidate for Clozaril given his level of aggression and refractory symptoms.  I do think he would be better served on an inpatient psychiatric unit that can meet his needs.  I am going to do my best to coordinate this with the administration at [facility], I placed a call to Dr. Sorenson the director.  I am also going to look into whether we can extend his Yanez order to include \"any neuroleptic\" given the challenges we are facing stabilizing him.  He has essentially been failing Risperdal, Zyprexa, Haldol, and the only other " "medicine listed is Invega.    1.  Schizophrenia, unspecified  2.  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  Alcohol use disorder in remission.   4.  Stimulant use disorder, in remission.   5.  Chronic obstructive pulmonary disease.   6.  Hypertension.   7. R/O mild TD vs EPSE       Plan:   1.  We will adjust Risperdal to 2 mg twice daily   2.  Will engage administration with discussions about trying to get him moved to an inpatient psychiatric unit at [place], station 12 would be appropriate given his unpredictable behavior.  Maintaining him on a medical unit presents significant challenges, and potential safety issues because he is difficult to contain because of agitated behavior   3.  I am asking the  to explore how we can extend his Yanez to include \"any neuroleptic medication\".  This would allow initiation of Clozaril given the refractory nature of his symptoms and current concerns about aggression   4.  Continue commitment, Yanez, pursuit of guardianship     Attestation:   Patient has been seen and evaluated by me,  Sushil Wilks MD    Visit/Communication Style   In person, face-to-face  "

## 2021-06-09 NOTE — PROGRESS NOTES
"[facility]    Medicine Progress Note - Hospitalist Service       Date of Admission:  9/9/2020  Assessment & Plan       Summary: John Juárez is a 55 year old male with PMHx of schizophrenia, hx of TBI, alcohol use disorder, COPD, hyperlipidemia and hypothyroidism who was admitted on 9/9/2020 for evaluation of aggressive behavior at his group home.   Was evaluated by Psychiatry and his condition stabilized, though his group home was unwilling to take him back and thus hospitalization has been prolonged awaiting new placement and guardianship. Under civil commitment through [facility].  Noted increasingly agitated behavior and attempts at elopement in May 2021.     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  Brought to hospital for evaluation of aggressive behaviors. Group home now unwilling to take him back. Has had numerous CODE 21s called this stay. Seen by psych, meds adjusted. Is currently under civil commitment and court hold through [facility] until 7/31/21.  - Appreciate Psychiatry involvement   - Haldol increased to 10 mg TID again 5/23, because of increasing agitation.   - Benztropine 1mg BID for EPS.   - Valproic acid 500mg at bedtime.  - Door alarm, 1:1 as needed.  - SW following for placement, placement will occur once guardianship is established.  - Also per Dr. Wilks on 5/25, \"if he continues to be agitated to this degree, it may make sense to pursue adult psychiatry referral as this is a very difficult setting to manage and adjust his medications obviously, he has long-term needs in terms of placement and supervision, but efforts to assess and resolve his agitation have thus far been ineffective despite many medication changes.\"  - 6/8/21 - consulted psychiatry again due to ongoing aggressive behaviors/hallucinations/delusions. See Dr. Murrieta's note " from 6/8/21, appreciate his assistance.  - 6/9/21 - psychiatry saw patient again. Increased Risperdal to 2 mg PO BID. Pursuing transfer to inpatient psychiatric unit. Appreciate Psychiatry assistance.    Chronic/Stable/Resolved  Possible Tardive Dyskinesia vs EPSE  Per psych assessment on 4/1/21, noted to have possible tardive dyskinesia vs extrapyramidal side effects of meds.  - At that time, recommended to increase Cogentin to 1mg BID and add vitamin E 800 international unit(s) daily.      Hyperlipidemia  - Chronic and stable on low dose ASA and atorvastatin.     COPD  - Not O2 dependent.  - Chronic and stable on salmeterol, albuterol prn.     Hypothyroidism  - Chronic and stable on levothyroxine.     Tobacco use disorder  - Using nicotine patch and PRN gum.     Hx of infective endocarditis with severe mitral and aortic regurgitation S/P bioprosthetic valve replacement (10/2015)  Systolic heart murmur  HFrEF, not in acute exacerbation  Follows with Dr. Dockery of Heart and Vascular Cardiology, last seen in 1/2020. Note heart murmur on exam, strongest in aortic region. No CP, SOB, dizziness, syncope. Echocardiogram in 5/2016 with EF 50%, no evidence for prosthetic mitral and aortic valve dysfunction.   - Follow-up with outpatient Cardiology upon dismissal from the hospital (on every 2 year follow-ups).  - Continue ASA.     Hx of CVA  - Note basal ganglia stroke in 2015. No focal neuro deficits aside from cognitive dysfunction as above.     Non-severe malnutrition  - Dietitian consulted.     Diet: Room Service  Diet  Combination Diet Regular Diet Adult; Safe Tray - with utensils  Snacks/Supplements Adult: Other; Bedtime snack; Between Meals  Room Service    DVT Prophylaxis: Low Risk/Ambulatory with no VTE prophylaxis indicated  Valentin Catheter: not present  Code Status: Full Code           Disposition Plan   Expected discharge: awaiting guardianship and placement, currently under a civil commitment through the end of  July. Psychiatry is pursuing transfer to an inpatient psychiatric unit.  Entered: Lamont Keys MD 06/09/2021, 9:36 AM       The patient's care was discussed with the Bedside Nurse and Patient.    Lamont Keys MD  Hospitalist Service  [facility]  Contact information available via [facility] Paging/Directory    ______________________________________________________________________    Interval History   John uJárez feels OK this morning. No new complaints/concerns.    Data reviewed today: I reviewed all medications, new labs and imaging results over the last 24 hours. I personally reviewed no images or EKG's today.    Physical Exam   Vital Signs: Temp: 97.4  F (36.3  C) Temp src: Oral BP: 96/63 Pulse: 69   Resp: 16 SpO2: 96 % O2 Device: None (Room air)    Weight: 185 lbs 9.6 oz  Constitutional: awake, alert, cooperative, no apparent distress, laying in the hospital bed  Respiratory: clear to auscultation bilaterally, no crackles or wheezing  Cardiovascular: regular rate and rhythm, normal S1 and S2, no murmur noted  GI: normal bowel sounds, soft, non-distended, non-tender  Skin: warm, dry    Data   Medications       aspirin  81 mg Oral Daily     atorvastatin  80 mg Oral At Bedtime     benztropine  1 mg Oral BID     divalproex sodium extended-release  500 mg Oral At Bedtime     docusate sodium  100 mg Oral BID     haloperidol  10 mg Oral TID     levothyroxine  88 mcg Oral Daily     memantine  5 mg Oral Daily     risperiDONE  2 mg Sublingual BID     salmeterol  1 puff Inhalation BID     vitamin E  800 Units Oral Daily

## 2021-06-09 NOTE — PROGRESS NOTES
"About 11:50 Pt was resting in bed watching television when suddenly he starts talking to his voices when he suddenly jumped up out of bed  grabbed transfer belt off the wall and swung it like a whip at the ground. x3 Sitter was able to leave the room safety to inform the RN.     Patient went and laid down after the 3 swings. Security was called up to unit.  RN gave med's. Belt was moved to a safe location for patient safety. Patient got up out of bed and talk with security about \"fred millan and why she committed me\" patient was yelling at security but calmed down quickly, but yelled at his voices saying \"you're going to shelter fred\" punches hand and relaxes. Enjoyed a treat and pop sitting in his chair.    Writer asked who he was talking too, if he was hearing voices and how many. He states yes just one. Writer asked what they were telling him and replies nothing. Offered games as intervention but declines.    At 12:12 pt gets up uses the bathroom and writer can hear pt punching his hand but not chatting with his voices, went back to bed after.    Leaving room door open for safety. Writer remains in the room.  "

## 2021-06-09 NOTE — PLAN OF CARE
DATE & TIME: 6/8/2021 0788-3218           Cognitive Concerns/ Orientation : Pt A/Ox1, oriented to self   BEHAVIOR & AGGRESSION TOOL COLOR: Green to yellow. PRN Risperidone given once around 1100. Seen by psych today, no med adjustment made. Quiet this evening.   ABNL VS/O2: VSS on RA, q24h vitals  MOBILITY: SBA with GB, slightly unsteady on feet.   PAIN MANAGMENT: Denies  DIET: Regular  BOWEL/BLADDER: Continent  DRAIN/DEVICES: No IV access  SKIN: WDL  D/C DATE: Discharge pending guardianship and placement. Civil commitment and court hold through [facility] until 7/31/21. Psych had filled paperwork to extend commitment.   OTHER IMPORTANT INFO: Sitter and door alarm for elopement risk        5/7/2021: Lynda,  from [facility] spoke with pt for pre petition screening. Lynda stated that she did not need to speak with our  as she had been contacting with pt's [facility] .

## 2021-06-10 PROCEDURE — 250N000013 HC RX MED GY IP 250 OP 250 PS 637: Performed by: INTERNAL MEDICINE

## 2021-06-10 PROCEDURE — 99231 SBSQ HOSP IP/OBS SF/LOW 25: CPT | Performed by: INTERNAL MEDICINE

## 2021-06-10 PROCEDURE — 250N000013 HC RX MED GY IP 250 OP 250 PS 637: Performed by: PSYCHIATRY & NEUROLOGY

## 2021-06-10 PROCEDURE — 120N000001 HC R&B MED SURG/OB

## 2021-06-10 RX ADMIN — SALMETEROL XINAFOATE 1 PUFF: 50 POWDER, METERED ORAL; RESPIRATORY (INHALATION) at 10:49

## 2021-06-10 RX ADMIN — SALMETEROL XINAFOATE 1 PUFF: 50 POWDER, METERED ORAL; RESPIRATORY (INHALATION) at 21:28

## 2021-06-10 RX ADMIN — HALOPERIDOL 10 MG: 5 TABLET ORAL at 08:53

## 2021-06-10 RX ADMIN — ACETAMINOPHEN 650 MG: 325 TABLET, FILM COATED ORAL at 13:27

## 2021-06-10 RX ADMIN — BENZTROPINE MESYLATE 1 MG: 0.5 TABLET ORAL at 08:53

## 2021-06-10 RX ADMIN — MEMANTINE 5 MG: 5 TABLET ORAL at 08:53

## 2021-06-10 RX ADMIN — RISPERIDONE 2 MG: 1 TABLET, ORALLY DISINTEGRATING ORAL at 21:22

## 2021-06-10 RX ADMIN — RISPERIDONE 2 MG: 1 TABLET, ORALLY DISINTEGRATING ORAL at 08:53

## 2021-06-10 RX ADMIN — Medication 800 UNITS: at 08:53

## 2021-06-10 RX ADMIN — DOCUSATE SODIUM 100 MG: 100 CAPSULE, LIQUID FILLED ORAL at 08:53

## 2021-06-10 RX ADMIN — LEVOTHYROXINE SODIUM 88 MCG: 88 TABLET ORAL at 08:53

## 2021-06-10 RX ADMIN — BENZTROPINE MESYLATE 1 MG: 0.5 TABLET ORAL at 21:23

## 2021-06-10 RX ADMIN — RISPERIDONE 1 MG: 1 TABLET, ORALLY DISINTEGRATING ORAL at 17:34

## 2021-06-10 RX ADMIN — ATORVASTATIN CALCIUM 80 MG: 40 TABLET, FILM COATED ORAL at 21:23

## 2021-06-10 RX ADMIN — DOCUSATE SODIUM 100 MG: 100 CAPSULE, LIQUID FILLED ORAL at 21:23

## 2021-06-10 RX ADMIN — HALOPERIDOL 10 MG: 5 TABLET ORAL at 21:23

## 2021-06-10 RX ADMIN — ASPIRIN 81 MG: 81 TABLET, COATED ORAL at 08:53

## 2021-06-10 RX ADMIN — HALOPERIDOL 10 MG: 5 TABLET ORAL at 13:27

## 2021-06-10 RX ADMIN — DIVALPROEX SODIUM 500 MG: 500 TABLET, FILM COATED, EXTENDED RELEASE ORAL at 21:23

## 2021-06-10 ASSESSMENT — ACTIVITIES OF DAILY LIVING (ADL)
ADLS_ACUITY_SCORE: 13

## 2021-06-10 NOTE — PLAN OF CARE
Patient A&0x3. Disoriented to situation. He frequently asks When he can leave. He has not been agitated this shift. Lungs clear. No edema. Tylenol given for c/o foot pain.  Up ad holley. Door alarm on. Full code. Under commitment and pittman order. Awaiting for guardianship to be arranged.

## 2021-06-10 NOTE — PROGRESS NOTES
[facility]    Medicine Progress Note - Hospitalist Service        Date of Admission:  9/9/2020  6:29 AM    Assessment & Plan:   John Juárez is a 55 year old male with PMHx of schizophrenia, hx of TBI, alcohol use disorder, COPD, hyperlipidemia and hypothyroidism who was admitted on 9/9/2020 for evaluation of aggressive behavior at his group home.   Was evaluated by Psychiatry and his condition stabilized, though his group home was unwilling to take him back and thus hospitalization has been prolonged awaiting new placement and guardianship. Under civil commitment through [facility].  Noted increasingly agitated behavior and attempts at elopement in May 2021.     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  Brought to hospital for evaluation of aggressive behaviors. Group home now unwilling to take him back. Has had numerous CODE 21s called this stay. Seen by psych, meds adjusted. Is currently under civil commitment and court hold through [facility] until 7/31/21.  - Appreciate Psychiatry involvement  - Haldol increased to 10 mg TID again 5/23, because of increasing agitation.   - Benztropine 1mg BID for EPS.   - Valproic acid 500mg at bedtime.  - Door alarm, 1:1 as needed.  - SW following for placement, placement will occur once guardianship is established.  - 6/8/21 - consulted psychiatry again due to ongoing aggressive behaviors/hallucinations/delusions. See Dr. Murrieta's note from 6/8/21, appreciate his assistance.  - 6/9/21 - psychiatry saw patient again. Increased Risperdal to 2 mg PO BID. Pursuing transfer to inpatient psychiatric unit. Appreciate Psychiatry assistance.     Chronic/Stable/Resolved  Possible Tardive Dyskinesia vs EPSE  Per psych assessment on 4/1/21, noted to have possible tardive dyskinesia vs extrapyramidal side effects of meds.  - At that time,  recommended to increase Cogentin to 1mg BID and add vitamin E 800 international unit(s) daily.      Hyperlipidemia  - Chronic and stable on low dose ASA and atorvastatin.     COPD  - Not O2 dependent.  - Chronic and stable on salmeterol, albuterol prn.     Hypothyroidism  - Chronic and stable on levothyroxine.     Tobacco use disorder  - Using nicotine patch and PRN gum.     Hx of infective endocarditis with severe mitral and aortic regurgitation S/P bioprosthetic valve replacement (10/2015)  Systolic heart murmur  HFrEF, not in acute exacerbation  Follows with Dr. Dockery of Heart and Vascular Cardiology, last seen in 1/2020. Note heart murmur on exam, strongest in aortic region. No CP, SOB, dizziness, syncope. Echocardiogram in 5/2016 with EF 50%, no evidence for prosthetic mitral and aortic valve dysfunction.   - Follow-up with outpatient Cardiology upon dismissal from the hospital (on every 2 year follow-ups).  - Continue ASA.     Hx of CVA  - Note basal ganglia stroke in 2015. No focal neuro deficits aside from cognitive dysfunction as above.      Non-severe malnutrition  - Dietitian consulted.    Diet: Room Service  Diet  Combination Diet Regular Diet Adult; Safe Tray - with utensils  Snacks/Supplements Adult: Other; Bedtime snack; Between Meals  Room Service     DVT Prophylaxis: Pneumatic Compression Devices   Valentin Catheter: not present  Code Status: Full Code     Disposition Plan    Expected discharge: Awaiting guardianship and placement.  Currently under civil commitment through the end of July.  Psychiatry is pursuing transfer patient to inpatient psychiatry unit.  Entered: Teja Cardona MD 06/10/2021, 1:00 PM        The patient's care was discussed with the Bedside Nurse and Patient.    Teja Cardona MD  Hospitalist Service  [facility]    ______________________________________________________________________    Interval History   No acute events overnight.  Patient appears  relatively calm at the time of my evaluation.    Data reviewed today: I reviewed all medications, new labs and imaging results over the last 24 hours. I personally reviewed no images or EKG's today.    Physical Exam   Vital signs:  Temp: 97.3  F (36.3  C) Temp src: Oral BP: 101/65 Pulse: 76   Resp: 18 SpO2: 94 % O2 Device: None (Room air)   Height: 182.9 cm (6') Weight: 84.2 kg (185 lb 9.6 oz)  Estimated body mass index is 25.17 kg/m  as calculated from the following:    Height as of this encounter: 1.829 m (6').    Weight as of this encounter: 84.2 kg (185 lb 9.6 oz).      Wt Readings from Last 2 Encounters:   03/31/21 84.2 kg (185 lb 9.6 oz)   08/26/20 70.3 kg (155 lb)       Gen: Awake alert, oriented x1-2, difficult to accurately assess his orientation  Resp: CTA B/L, normal WOB  CVS: RRR, no murmur  Abd/GI: Soft, non-tender. BS- normoactive.    Skin: Warm, dry no rashes  Neuro- CN- intact. No focal deficits.        Data   No lab results found in last 7 days.    No results found for this or any previous visit (from the past 24 hour(s)).  Medications       aspirin  81 mg Oral Daily     atorvastatin  80 mg Oral At Bedtime     benztropine  1 mg Oral BID     divalproex sodium extended-release  500 mg Oral At Bedtime     docusate sodium  100 mg Oral BID     haloperidol  10 mg Oral TID     levothyroxine  88 mcg Oral Daily     memantine  5 mg Oral Daily     risperiDONE  2 mg Sublingual BID     salmeterol  1 puff Inhalation BID     vitamin E  800 Units Oral Daily

## 2021-06-10 NOTE — PLAN OF CARE
DATE & TIME: 6/9/21 3836-4647    Cognitive Concerns/ Orientation : Pt A/Ox1, oriented to self   BEHAVIOR & AGGRESSION TOOL COLOR: Green   ABNL VS/O2: VSS on RA, daily vitals  MOBILITY: Independent in room, SBA in hallway  PAIN MANAGMENT: No signs of pain  DIET: Regular  BOWEL/BLADDER: Continent  ABNL LAB/BG: COVID negative today  DRAIN/DEVICES: No IV access  SKIN: WERO, pt sleeping through the night  D/C DATE: Discharge pending guardianship and placement. Commitment and court hold through 7/31/21  OTHER IMPORTANT INFO: Sitter and door alarm.

## 2021-06-10 NOTE — PLAN OF CARE
DATE & TIME: 6/9/2021 3-11pm    Cognitive Concerns/ Orientation : Pt A/Ox1, oriented to self   BEHAVIOR & AGGRESSION TOOL COLOR: Green. PRN ativan x1 for anxiety. Otherwise calm and cooperative.  Seen by psych again today, pursuing inpatient psych unit.  following. Sitter at bedside.  ABNL VS/O2: q24h vitals. No vital check this shift. RA.  MOBILITY: SBA with GB, slightly unsteady on feet.   PAIN MANAGMENT: Denies  DIET: Regular  BOWEL/BLADDER: Continent. Had bm  DRAIN/DEVICES: No IV access  SKIN: bruise on arm. Scab on both feet.   D/C DATE: Discharge pending guardianship and placement. Civil commitment and court hold through [facility] until 7/31/21.   OTHER IMPORTANT INFO: Sitter and door alarm for elopement risk. Had shower in the morning.

## 2021-06-11 PROCEDURE — 250N000013 HC RX MED GY IP 250 OP 250 PS 637: Performed by: HOSPITALIST

## 2021-06-11 PROCEDURE — 250N000013 HC RX MED GY IP 250 OP 250 PS 637: Performed by: INTERNAL MEDICINE

## 2021-06-11 PROCEDURE — 120N000001 HC R&B MED SURG/OB

## 2021-06-11 PROCEDURE — 250N000013 HC RX MED GY IP 250 OP 250 PS 637: Performed by: PSYCHIATRY & NEUROLOGY

## 2021-06-11 PROCEDURE — 99231 SBSQ HOSP IP/OBS SF/LOW 25: CPT | Performed by: INTERNAL MEDICINE

## 2021-06-11 RX ADMIN — DOCUSATE SODIUM 100 MG: 100 CAPSULE, LIQUID FILLED ORAL at 08:31

## 2021-06-11 RX ADMIN — MEMANTINE 5 MG: 5 TABLET ORAL at 08:30

## 2021-06-11 RX ADMIN — DIVALPROEX SODIUM 500 MG: 500 TABLET, FILM COATED, EXTENDED RELEASE ORAL at 21:56

## 2021-06-11 RX ADMIN — RISPERIDONE 2 MG: 1 TABLET, ORALLY DISINTEGRATING ORAL at 08:30

## 2021-06-11 RX ADMIN — DOCUSATE SODIUM 100 MG: 100 CAPSULE, LIQUID FILLED ORAL at 21:54

## 2021-06-11 RX ADMIN — HALOPERIDOL 10 MG: 5 TABLET ORAL at 14:09

## 2021-06-11 RX ADMIN — RISPERIDONE 1 MG: 1 TABLET, ORALLY DISINTEGRATING ORAL at 12:40

## 2021-06-11 RX ADMIN — SALMETEROL XINAFOATE 1 PUFF: 50 POWDER, METERED ORAL; RESPIRATORY (INHALATION) at 08:31

## 2021-06-11 RX ADMIN — BENZTROPINE MESYLATE 1 MG: 0.5 TABLET ORAL at 08:30

## 2021-06-11 RX ADMIN — LORAZEPAM 1 MG: 1 TABLET ORAL at 09:46

## 2021-06-11 RX ADMIN — ATORVASTATIN CALCIUM 80 MG: 40 TABLET, FILM COATED ORAL at 21:55

## 2021-06-11 RX ADMIN — RISPERIDONE 2 MG: 1 TABLET, ORALLY DISINTEGRATING ORAL at 21:56

## 2021-06-11 RX ADMIN — ASPIRIN 81 MG: 81 TABLET, COATED ORAL at 08:30

## 2021-06-11 RX ADMIN — MELATONIN TAB 3 MG 3 MG: 3 TAB at 21:58

## 2021-06-11 RX ADMIN — BENZTROPINE MESYLATE 1 MG: 0.5 TABLET ORAL at 21:55

## 2021-06-11 RX ADMIN — Medication 800 UNITS: at 08:30

## 2021-06-11 RX ADMIN — HALOPERIDOL 10 MG: 5 TABLET ORAL at 21:55

## 2021-06-11 RX ADMIN — SALMETEROL XINAFOATE 1 PUFF: 50 POWDER, METERED ORAL; RESPIRATORY (INHALATION) at 22:00

## 2021-06-11 RX ADMIN — LEVOTHYROXINE SODIUM 88 MCG: 88 TABLET ORAL at 08:31

## 2021-06-11 RX ADMIN — HALOPERIDOL 10 MG: 5 TABLET ORAL at 08:30

## 2021-06-11 ASSESSMENT — ACTIVITIES OF DAILY LIVING (ADL)
ADLS_ACUITY_SCORE: 13

## 2021-06-11 NOTE — PLAN OF CARE
DATE & TIME: 6/10/21 7666-3288               Cognitive Concerns/ Orientation : Pt A&Ox2-3, oriented to self   BEHAVIOR & AGGRESSION TOOL COLOR: Green; prn Risperidone x1             ABNL VS/O2: VSS on RA  MOBILITY: Independent in room, SBA in hallway  PAIN MANAGMENT: pt does not c/o pain or N&V  DIET: Tolerating reg diet  BOWEL/BLADDER: Continent  ABNL LAB/BG: COVID negative today  DRAIN/DEVICES: No IV access  SKIN: WDL  D/C DATE: Discharge pending guardianship and placement. Commitment and court hold through 7/31/21  OTHER IMPORTANT INFO: Sitter and door alarm.

## 2021-06-11 NOTE — PLAN OF CARE
DATE & TIME: 6/10/21 [id number]    Cognitive Concerns/ Orientation : Pt A/Ox3, disoriented to situation  BEHAVIOR & AGGRESSION TOOL COLOR: Green   ABNL VS/O2: VSS on RA, daily vitals  MOBILITY: Independent in room, SBA in hallway  PAIN MANAGMENT: No signs of pain  DIET: Regular  BOWEL/BLADDER: Continent  ABNL LAB/BG: COVID negative today  DRAIN/DEVICES: No IV access  SKIN: WERO, pt sleeping through the night  D/C DATE: Discharge pending guardianship and placement. Commitment and court hold through 7/31/21  OTHER IMPORTANT INFO: long arm  and door alarm.      Patient A&0x3. Disoriented to situation. He frequently asks When he can leave. He has not been agitated this shift. Lungs clear. No edema. Tylenol given for c/o foot pain.  Up ad holley. Door alarm on. Full code. Under commitment and pittman order. Awaiting for guardianship to be arranged.

## 2021-06-11 NOTE — PLAN OF CARE
"Patient Disoriented to situation. He is up ad holley in his room. He is med compliant. Showered this AM. He was calm this morning and began getting more paranoid /agitated.  He is now making paranoid statements of \" I didn't rape nobody. When the baby comes out, It won't have my blood type. I guarantee it. They can say whatever they want. I didn't rape nobody!\"  Attempted to calm and redirect patient with out much success so PRN medication given. Patient now resting comfortably.  Full code. Regular diet. VSS. ON RA. Denies pain.  "

## 2021-06-11 NOTE — PROGRESS NOTES
"Attendant note:Pt cooperative,calm. Ambulated in cruz several times, played cards. Talked about his life growing up in [facility].  Pt would lie down in bed and all of a sudden would sit up and start talking to someone not present in the room, wrote down a telephone number he said his sister Freida was giving him. Pt did suddenly sit up, cursing and hitting his fist int other hand, then would just lie back again. Pt asks multiple times \"which hospital are we at?\" Stated Nurses have a tough job!\"  "

## 2021-06-11 NOTE — PROGRESS NOTES
[facility]    Medicine Progress Note - Hospitalist Service        Date of Admission:  9/9/2020  6:29 AM    Assessment & Plan:   John Juárez is a 55 year old male with PMHx of schizophrenia, hx of TBI, alcohol use disorder, COPD, hyperlipidemia and hypothyroidism who was admitted on 9/9/2020 for evaluation of aggressive behavior at his group home.   Was evaluated by Psychiatry and his condition stabilized, though his group home was unwilling to take him back and thus hospitalization has been prolonged awaiting new placement and guardianship. Under civil commitment through [facility].  Noted increasingly agitated behavior and attempts at elopement in May 2021.     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  Brought to hospital for evaluation of aggressive behaviors. Group home now unwilling to take him back. Has had numerous CODE 21s called this stay. Seen by psych, meds adjusted. Is currently under civil commitment and court hold through [facility] until 7/31/21.  -Appreciate Psychiatry involvement  -Haldol increased to 10 mg TID on 5/23, because of increasing agitation.  -Benztropine 1mg BID for EPS.  -Valproic acid 500mg at bedtime.  -Door alarm, 1:1 as needed.  -SW following for placement, placement will occur once guardianship is established.  - 6/8/21 - consulted psychiatry again due to ongoing aggressive behaviors/hallucinations/delusions. See Dr. Murrieta's note from 6/8/21, appreciate his assistance.  - 6/9/21 - psychiatry saw patient again. Increased Risperdal to 2 mg PO BID. Pursuing transfer to inpatient psychiatric unit.      Chronic/Stable/Resolved  Possible Tardive Dyskinesia vs EPSE  Per psych assessment on 4/1/21, noted to have possible tardive dyskinesia vs extrapyramidal side effects of meds.  - At that time, recommended to increase Cogentin to 1mg BID and add  vitamin E 800 international unit(s) daily.      Hyperlipidemia  - Chronic and stable on low dose ASA and atorvastatin.     COPD  - Not O2 dependent.  - Chronic and stable on salmeterol, albuterol prn.     Hypothyroidism  - Chronic and stable on levothyroxine.     Tobacco use disorder  - Using nicotine patch and PRN gum.     Hx of infective endocarditis with severe mitral and aortic regurgitation S/P bioprosthetic valve replacement (10/2015)  Systolic heart murmur  HFrEF, not in acute exacerbation  Follows with Dr. Dockery of Heart and Vascular Cardiology, last seen in 1/2020. Note heart murmur on exam, strongest in aortic region. No CP, SOB, dizziness, syncope. Echocardiogram in 5/2016 with EF 50%, no evidence for prosthetic mitral and aortic valve dysfunction.   - Follow-up with outpatient Cardiology upon dismissal from the hospital (on every 2 year follow-ups).  - Continue ASA.     Hx of CVA  - Note basal ganglia stroke in 2015. No focal neuro deficits aside from cognitive dysfunction as above.      Non-severe malnutrition  - Dietitian consulted.    Diet: Room Service  Diet  Combination Diet Regular Diet Adult; Safe Tray - with utensils  Snacks/Supplements Adult: Other; Bedtime snack; Between Meals  Room Service     DVT Prophylaxis: Pneumatic Compression Devices   Valentin Catheter: not present  Code Status: Full Code     Disposition Plan    Expected discharge: Awaiting guardianship and placement.  Currently under civil commitment through the end of July.  Psychiatry is pursuing transfer patient to inpatient psychiatry unit.  Entered: Teja Cardona MD 06/11/2021, 12:52 PM        The patient's care was discussed with the Bedside Nurse and Patient.    Teja Cardona MD  Hospitalist Service  [facility]    ______________________________________________________________________    Interval History   No acute events overnight.  Patient was slightly agitated with me asking when he can leave the  [facility].    Data reviewed today: I reviewed all medications, new labs and imaging results over the last 24 hours. I personally reviewed no images or EKG's today.    Physical Exam   Vital signs:  Temp: 98.1  F (36.7  C) Temp src: Oral BP: 97/69 Pulse: 77   Resp: 16 SpO2: 97 % O2 Device: None (Room air)   Height: 182.9 cm (6') Weight: 84.2 kg (185 lb 9.6 oz)  Estimated body mass index is 25.17 kg/m  as calculated from the following:    Height as of this encounter: 1.829 m (6').    Weight as of this encounter: 84.2 kg (185 lb 9.6 oz).      Wt Readings from Last 2 Encounters:   03/31/21 84.2 kg (185 lb 9.6 oz)   08/26/20 70.3 kg (155 lb)       Gen: Awake alert, oriented x2-3, difficult to accurately assess his orientation  Resp: CTA B/L, normal WOB  CVS: RRR, no murmur  Abd/GI: Soft, non-tender. BS- normoactive.    Skin: Warm, dry no rashes  Neuro- CN- intact. No focal deficits.        Data   No lab results found in last 7 days.    No results found for this or any previous visit (from the past 24 hour(s)).  Medications       aspirin  81 mg Oral Daily     atorvastatin  80 mg Oral At Bedtime     benztropine  1 mg Oral BID     divalproex sodium extended-release  500 mg Oral At Bedtime     docusate sodium  100 mg Oral BID     haloperidol  10 mg Oral TID     levothyroxine  88 mcg Oral Daily     memantine  5 mg Oral Daily     risperiDONE  2 mg Sublingual BID     salmeterol  1 puff Inhalation BID     vitamin E  800 Units Oral Daily

## 2021-06-12 PROCEDURE — 120N000001 HC R&B MED SURG/OB

## 2021-06-12 PROCEDURE — 250N000013 HC RX MED GY IP 250 OP 250 PS 637: Performed by: PSYCHIATRY & NEUROLOGY

## 2021-06-12 PROCEDURE — 99231 SBSQ HOSP IP/OBS SF/LOW 25: CPT | Performed by: INTERNAL MEDICINE

## 2021-06-12 PROCEDURE — 250N000013 HC RX MED GY IP 250 OP 250 PS 637: Performed by: INTERNAL MEDICINE

## 2021-06-12 PROCEDURE — 250N000013 HC RX MED GY IP 250 OP 250 PS 637: Performed by: HOSPITALIST

## 2021-06-12 RX ADMIN — RISPERIDONE 2 MG: 1 TABLET, ORALLY DISINTEGRATING ORAL at 08:22

## 2021-06-12 RX ADMIN — ASPIRIN 81 MG: 81 TABLET, COATED ORAL at 08:22

## 2021-06-12 RX ADMIN — BENZTROPINE MESYLATE 1 MG: 0.5 TABLET ORAL at 08:22

## 2021-06-12 RX ADMIN — RISPERIDONE 2 MG: 1 TABLET, ORALLY DISINTEGRATING ORAL at 21:18

## 2021-06-12 RX ADMIN — HALOPERIDOL 10 MG: 5 TABLET ORAL at 15:34

## 2021-06-12 RX ADMIN — BENZTROPINE MESYLATE 1 MG: 0.5 TABLET ORAL at 21:18

## 2021-06-12 RX ADMIN — SALMETEROL XINAFOATE 1 PUFF: 50 POWDER, METERED ORAL; RESPIRATORY (INHALATION) at 21:22

## 2021-06-12 RX ADMIN — MEMANTINE 5 MG: 5 TABLET ORAL at 08:22

## 2021-06-12 RX ADMIN — Medication 800 UNITS: at 08:22

## 2021-06-12 RX ADMIN — LEVOTHYROXINE SODIUM 88 MCG: 88 TABLET ORAL at 08:22

## 2021-06-12 RX ADMIN — MELATONIN TAB 3 MG 3 MG: 3 TAB at 21:39

## 2021-06-12 RX ADMIN — DOCUSATE SODIUM 100 MG: 100 CAPSULE, LIQUID FILLED ORAL at 21:18

## 2021-06-12 RX ADMIN — DIVALPROEX SODIUM 500 MG: 500 TABLET, FILM COATED, EXTENDED RELEASE ORAL at 21:18

## 2021-06-12 RX ADMIN — ATORVASTATIN CALCIUM 80 MG: 40 TABLET, FILM COATED ORAL at 21:18

## 2021-06-12 RX ADMIN — HALOPERIDOL 10 MG: 5 TABLET ORAL at 21:18

## 2021-06-12 RX ADMIN — SALMETEROL XINAFOATE 1 PUFF: 50 POWDER, METERED ORAL; RESPIRATORY (INHALATION) at 08:22

## 2021-06-12 RX ADMIN — DOCUSATE SODIUM 100 MG: 100 CAPSULE, LIQUID FILLED ORAL at 08:22

## 2021-06-12 RX ADMIN — HALOPERIDOL 10 MG: 5 TABLET ORAL at 08:22

## 2021-06-12 ASSESSMENT — ACTIVITIES OF DAILY LIVING (ADL)
ADLS_ACUITY_SCORE: 13

## 2021-06-12 NOTE — PLAN OF CARE
DATE & TIME: 6/11 7-11 PM  Cognitive Concerns/ Orientation : Alert but confused, disoriented to place, time,situation.   BEHAVIOR & AGGRESSION TOOL COLOR: Green but has a history of yellow  ABNL VS/O2: Daily VSS on room air   MOBILITY: Independent in room, SBA in hallway  PAIN MANAGMENT: denies    DIET: Regular  BOWEL/BLADDER: Continent  ABNL LAB/BG: COVID negative 6/9/21  DRAIN/DEVICES: No IV access  SKIN: intact  D/C DATE: Discharge pending guardianship and placement. Commitment and court hold through 7/31/21  OTHER IMPORTANT INFO: Sitter at bedside

## 2021-06-12 NOTE — PLAN OF CARE
DATE & TIME: 6/11/21 4794-8640  Cognitive Concerns/ Orientation : Alert but confused, disoriented to time,situation.   BEHAVIOR & AGGRESSION TOOL COLOR: Green but has a history of yellow  ABNL VS/O2: Daily VSS on room air   MOBILITY: Independent in room, SBA in hallway  PAIN MANAGMENT: denies    DIET: Regular  BOWEL/BLADDER: Continent  ABNL LAB/BG: COVID negative 6/9/21  DRAIN/DEVICES: No IV access  SKIN: intact  D/C DATE: Discharge pending guardianship and placement. Commitment and court hold through 7/31/21  OTHER IMPORTANT INFO: Sitter at bedside

## 2021-06-12 NOTE — PLAN OF CARE
"DATE & TIME: 6/11/21 2898-1205   Cognitive Concerns/ Orientation : Alert, disoriented to situation.   BEHAVIOR & AGGRESSION TOOL COLOR: Around 1240, came to  very upset and asking \"why am I here still, I didn't do anything wrong\" and to call the police, PRN Risperdal given, security called for support. Appears to be hearing voices and talking to self per RN attendant this evening   ABNL VS/O2: VSS on RA, daily vitals  MOBILITY: Independent in room, SBA in hallway  PAIN MANAGMENT: denies pain  DIET: Regular  BOWEL/BLADDER: Continent  ABNL LAB/BG: COVID negative 6/9/21  DRAIN/DEVICES: No IV access  SKIN: intact  D/C DATE: Discharge pending guardianship and placement. Commitment and court hold through 7/31/21  OTHER IMPORTANT INFO: has attendant  and door alarm.          "

## 2021-06-12 NOTE — PLAN OF CARE
Cognitive Concerns/ Orientation : Alert but confused, disoriented to time,situation.   BEHAVIOR & AGGRESSION TOOL COLOR: Green but has a history of yellow  ABNL VS/O2: Daily VSS on room air   MOBILITY: Independent in room, SBA in hallway  PAIN MANAGMENT: denies    DIET: Regular  BOWEL/BLADDER: Continent  ABNL LAB/BG: COVID negative 6/9/21  DRAIN/DEVICES: No IV access  SKIN: intact  D/C DATE: Discharge pending guardianship and placement. Commitment and court hold through 7/31/21  OTHER IMPORTANT INFO: Sitter at bedside . Calm and cooperative this sh

## 2021-06-12 NOTE — PROGRESS NOTES
Care Management Follow Up    Length of Stay (days): 266    Expected Discharge Date: 06/30/21     Concerns to be Addressed: patient refuses services, discharge planning     Patient plan of care discussed at interdisciplinary rounds: Yes    Anticipated Discharge Disposition: (residential care home or nursing home)     Anticipated Discharge Services: None  Anticipated Discharge DME: None    Patient/family educated on Medicare website which has current facility and service quality ratings: (not applicable)  Education Provided on the Discharge Plan:    Patient/Family in Agreement with the Plan: no    Referrals Placed by CM/SW: Post Acute Facilities  Private pay costs discussed: Not applicable    Additional Information:  Summary of discharge planning as of 6/11/21    Patient's Behavioral , Ofe Chapin, was able to come to the hospital on Thursday and meet patient for the first time.  Prior to this time he had only spoken to patient over the phone due to his employer's COVID restrictions.  Mr Chapin has filed papers with [facility] to extend patient's current Mental Health Commitment and [place] Petition.      Both Mr Chapin and hospital retained  continue to search for a professional guardian for patient.  A professional guardian must be secured before the guardianship petition can be filed with [facility].  Mr Chapin has contacted [facility] Attorney's office and spoken with  Camacho Gonzalez and discussed the difficulty in finding a professional guardian.      Dr Wilks has made a request  with [place] [facility] to accept patient for ongoing care.    In regards with disposition, once a guardian is appointed, the anticipated discharge is to [facility] which is a skilled nursing facility which has a secured male unit for patient's with mental health diagnosis.  The is contingent upon the facility assessing they can meet patient's  needs. Once we have a guardianship hearing date established, writer will ask the [employer] to re-assess.           Lulu Contreras, ASIMSW

## 2021-06-12 NOTE — PROGRESS NOTES
[facility]    Medicine Progress Note - Hospitalist Service        Date of Admission:  9/9/2020  6:29 AM    Assessment & Plan:   John Juárez is a 55 year old male with PMHx of schizophrenia, hx of TBI, alcohol use disorder, COPD, hyperlipidemia and hypothyroidism who was admitted on 9/9/2020 for evaluation of aggressive behavior at his group home.   Was evaluated by Psychiatry and his condition stabilized, though his group home was unwilling to take him back and thus hospitalization has been prolonged awaiting new placement and guardianship. Under civil commitment through [facility].  Noted increasingly agitated behavior and attempts at elopement in May 2021.     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  Brought to hospital for evaluation of aggressive behaviors. Group home now unwilling to take him back. Has had numerous CODE 21s called this stay. Seen by psych, meds adjusted. Is currently under civil commitment and court hold through [facility] until 7/31/21.  -Appreciate Psychiatry involvement  -Haldol increased to 10 mg TID on 5/23, because of increasing agitation.  -Benztropine 1mg BID for EPS.  -Valproic acid 500mg at bedtime.  -Door alarm, 1:1 as needed.  -SW following for placement, placement will occur once guardianship is established. The anticipated discharge is to [facility] which is a skilled nursing facility which has a secured male unit for patients with mental health diagnosis.  - 6/8/21 - consulted psychiatry again due to ongoing aggressive behaviors/hallucinations/delusions. See Dr. Murrieta's note from 6/8/21, appreciate his assistance.  - 6/9/21 - psychiatry saw patient again. Increased Risperdal to 2 mg PO BID. Pursuing transfer to inpatient psychiatric unit for ongoing care until guardian is  appointed.     Chronic/Stable/Resolved  Possible Tardive Dyskinesia vs EPSE  Per psych assessment on 4/1/21, noted to have possible tardive dyskinesia vs extrapyramidal side effects of meds.  - At that time, recommended to increase Cogentin to 1mg BID and add vitamin E 800 international unit(s) daily.      Hyperlipidemia  - Chronic and stable on low dose ASA and atorvastatin.     COPD  - Not O2 dependent.  - Chronic and stable on salmeterol, albuterol prn.     Hypothyroidism  - Chronic and stable on levothyroxine.     Tobacco use disorder  - Using nicotine patch and PRN gum.     Hx of infective endocarditis with severe mitral and aortic regurgitation S/P bioprosthetic valve replacement (10/2015)  Systolic heart murmur  HFrEF, not in acute exacerbation  Follows with Dr. Dockery of Heart and Vascular Cardiology, last seen in 1/2020. Note heart murmur on exam, strongest in aortic region. No CP, SOB, dizziness, syncope. Echocardiogram in 5/2016 with EF 50%, no evidence for prosthetic mitral and aortic valve dysfunction.   - Follow-up with outpatient Cardiology upon dismissal from the hospital (on every 2 year follow-ups).  - Continue ASA.     Hx of CVA  - Note basal ganglia stroke in 2015. No focal neuro deficits aside from cognitive dysfunction as above.      Non-severe malnutrition  - Dietitian consulted.    Diet: Room Service  Diet  Combination Diet Regular Diet Adult; Safe Tray - with utensils  Snacks/Supplements Adult: Other; Bedtime snack; Between Meals  Room Service     DVT Prophylaxis: Pneumatic Compression Devices   Valentin Catheter: not present  Code Status: Full Code     Disposition Plan    Expected discharge: Awaiting guardianship and placement. Currently under civil commitment through the end of July.  Psychiatry is pursuing transfer patient to inpatient psychiatry unit.  Entered: Teja Cardona MD 06/12/2021, 1:10 PM        The patient's care was discussed with the Bedside Nurse and Patient.    Teja  MD Dulce  Hospitalist Service  [facility]    ______________________________________________________________________    Interval History   No acute events overnight.  Intermittently continues to be impulsive    Data reviewed today: I reviewed all medications, new labs and imaging results over the last 24 hours. I personally reviewed no images or EKG's today.    Physical Exam   Vital signs:  Temp: 97.9  F (36.6  C) Temp src: Oral BP: 106/71 Pulse: 63   Resp: 16 SpO2: 99 % O2 Device: None (Room air)   Height: 182.9 cm (6') Weight: 84.2 kg (185 lb 9.6 oz)  Estimated body mass index is 25.17 kg/m  as calculated from the following:    Height as of this encounter: 1.829 m (6').    Weight as of this encounter: 84.2 kg (185 lb 9.6 oz).      Wt Readings from Last 2 Encounters:   03/31/21 84.2 kg (185 lb 9.6 oz)   08/26/20 70.3 kg (155 lb)       Gen: Awake alert, oriented x2-3, difficult to accurately assess his orientation  Resp: CTA B/L, normal WOB  CVS: RRR, no murmur  Abd/GI: Soft, non-tender. BS- normoactive.    Skin: Warm, dry no rashes  Neuro- CN- intact. No focal deficits.        Data   No lab results found in last 7 days.    No results found for this or any previous visit (from the past 24 hour(s)).  Medications       aspirin  81 mg Oral Daily     atorvastatin  80 mg Oral At Bedtime     benztropine  1 mg Oral BID     divalproex sodium extended-release  500 mg Oral At Bedtime     docusate sodium  100 mg Oral BID     haloperidol  10 mg Oral TID     levothyroxine  88 mcg Oral Daily     memantine  5 mg Oral Daily     risperiDONE  2 mg Sublingual BID     salmeterol  1 puff Inhalation BID     vitamin E  800 Units Oral Daily

## 2021-06-13 PROCEDURE — 250N000013 HC RX MED GY IP 250 OP 250 PS 637: Performed by: HOSPITALIST

## 2021-06-13 PROCEDURE — 250N000013 HC RX MED GY IP 250 OP 250 PS 637: Performed by: INTERNAL MEDICINE

## 2021-06-13 PROCEDURE — 120N000001 HC R&B MED SURG/OB

## 2021-06-13 PROCEDURE — 99231 SBSQ HOSP IP/OBS SF/LOW 25: CPT | Performed by: INTERNAL MEDICINE

## 2021-06-13 PROCEDURE — 250N000013 HC RX MED GY IP 250 OP 250 PS 637: Performed by: PSYCHIATRY & NEUROLOGY

## 2021-06-13 RX ADMIN — BENZTROPINE MESYLATE 1 MG: 0.5 TABLET ORAL at 08:02

## 2021-06-13 RX ADMIN — HALOPERIDOL 10 MG: 5 TABLET ORAL at 20:43

## 2021-06-13 RX ADMIN — DOCUSATE SODIUM 100 MG: 100 CAPSULE, LIQUID FILLED ORAL at 08:02

## 2021-06-13 RX ADMIN — RISPERIDONE 1 MG: 1 TABLET, ORALLY DISINTEGRATING ORAL at 14:15

## 2021-06-13 RX ADMIN — HALOPERIDOL 10 MG: 5 TABLET ORAL at 14:15

## 2021-06-13 RX ADMIN — MELATONIN TAB 3 MG 3 MG: 3 TAB at 21:45

## 2021-06-13 RX ADMIN — Medication 800 UNITS: at 08:02

## 2021-06-13 RX ADMIN — HALOPERIDOL 10 MG: 5 TABLET ORAL at 08:02

## 2021-06-13 RX ADMIN — ACETAMINOPHEN 650 MG: 325 TABLET, FILM COATED ORAL at 21:45

## 2021-06-13 RX ADMIN — LEVOTHYROXINE SODIUM 88 MCG: 88 TABLET ORAL at 08:04

## 2021-06-13 RX ADMIN — RISPERIDONE 2 MG: 1 TABLET, ORALLY DISINTEGRATING ORAL at 08:02

## 2021-06-13 RX ADMIN — DOCUSATE SODIUM 100 MG: 100 CAPSULE, LIQUID FILLED ORAL at 20:43

## 2021-06-13 RX ADMIN — SALMETEROL XINAFOATE 1 PUFF: 50 POWDER, METERED ORAL; RESPIRATORY (INHALATION) at 20:43

## 2021-06-13 RX ADMIN — ASPIRIN 81 MG: 81 TABLET, COATED ORAL at 08:02

## 2021-06-13 RX ADMIN — MEMANTINE 5 MG: 5 TABLET ORAL at 08:02

## 2021-06-13 RX ADMIN — RISPERIDONE 2 MG: 1 TABLET, ORALLY DISINTEGRATING ORAL at 20:43

## 2021-06-13 RX ADMIN — DIVALPROEX SODIUM 500 MG: 500 TABLET, FILM COATED, EXTENDED RELEASE ORAL at 21:45

## 2021-06-13 RX ADMIN — SALMETEROL XINAFOATE 1 PUFF: 50 POWDER, METERED ORAL; RESPIRATORY (INHALATION) at 08:02

## 2021-06-13 RX ADMIN — ATORVASTATIN CALCIUM 80 MG: 40 TABLET, FILM COATED ORAL at 20:43

## 2021-06-13 RX ADMIN — ACETAMINOPHEN 650 MG: 325 TABLET, FILM COATED ORAL at 16:46

## 2021-06-13 RX ADMIN — BENZTROPINE MESYLATE 1 MG: 0.5 TABLET ORAL at 20:43

## 2021-06-13 ASSESSMENT — ACTIVITIES OF DAILY LIVING (ADL)
ADLS_ACUITY_SCORE: 13

## 2021-06-13 NOTE — PLAN OF CARE
Alert to self, loosely to time and place; forgetful. Up Ind. Sitter present. Pt calm and cooperative this shift. Ambulated in halls with sitter X 3. Tolerating regular diet. Denied pain. Continue to monitor.

## 2021-06-13 NOTE — PROGRESS NOTES
[facility]    Medicine Progress Note - Hospitalist Service        Date of Admission:  9/9/2020  6:29 AM    Assessment & Plan:   John Juárez is a 55 year old male with PMHx of schizophrenia, hx of TBI, alcohol use disorder, COPD, hyperlipidemia and hypothyroidism who was admitted on 9/9/2020 for evaluation of aggressive behavior at his group home.   Was evaluated by Psychiatry and his condition stabilized, though his group home was unwilling to take him back and thus hospitalization has been prolonged awaiting new placement and guardianship. Under civil commitment through [facility].  Noted increasingly agitated behavior and attempts at elopement in May 2021.     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  Brought to hospital for evaluation of aggressive behaviors. Group home now unwilling to take him back. Has had numerous CODE 21s called this stay. Seen by psych, meds adjusted. Is currently under civil commitment and court hold through [facility] until 7/31/21.  -Appreciate Psychiatry involvement  -Haldol increased to 10 mg TID on 5/23, because of increasing agitation.  -Benztropine 1mg BID for EPS.  -Valproic acid 500mg at bedtime.  -Door alarm, 1:1 as needed.  -SW following for placement, placement will occur once guardianship is established. The anticipated discharge is to [facility] which is a skilled nursing facility which has a secured male unit for patients with mental health diagnosis.  - 6/8/21 - consulted psychiatry again due to ongoing aggressive behaviors/hallucinations/delusions. See Dr. Murrieta's note from 6/8/21, appreciate his assistance.  - 6/9/21 - psychiatry saw patient again. Increased Risperdal to 2 mg PO BID. Pursuing transfer to inpatient psychiatric unit for ongoing care until guardian is  appointed.     Chronic/Stable/Resolved  Possible Tardive Dyskinesia vs EPSE  Per psych assessment on 4/1/21, noted to have possible tardive dyskinesia vs extrapyramidal side effects of meds.  - At that time, recommended to increase Cogentin to 1mg BID and add vitamin E 800 international unit(s) daily.      Hyperlipidemia  - Chronic and stable on low dose ASA and atorvastatin.     COPD  - Not O2 dependent.  - Chronic and stable on salmeterol, albuterol prn.     Hypothyroidism  - Chronic and stable on levothyroxine.     Tobacco use disorder  - Using nicotine patch and PRN gum.     Hx of infective endocarditis with severe mitral and aortic regurgitation S/P bioprosthetic valve replacement (10/2015)  Systolic heart murmur  HFrEF, not in acute exacerbation  Follows with Dr. Dockery of Heart and Vascular Cardiology, last seen in 1/2020. Note heart murmur on exam, strongest in aortic region. No CP, SOB, dizziness, syncope. Echocardiogram in 5/2016 with EF 50%, no evidence for prosthetic mitral and aortic valve dysfunction.   - Follow-up with outpatient Cardiology upon dismissal from the hospital (on every 2 year follow-ups).  - Continue ASA.     Hx of CVA  - Note basal ganglia stroke in 2015. No focal neuro deficits aside from cognitive dysfunction as above.      Non-severe malnutrition  - Dietitian consulted.    Diet: Room Service  Diet  Combination Diet Regular Diet Adult; Safe Tray - with utensils  Snacks/Supplements Adult: Other; Bedtime snack; Between Meals  Room Service     DVT Prophylaxis: Pneumatic Compression Devices   Valentin Catheter: not present  Code Status: Full Code     Disposition Plan    Expected discharge: Awaiting guardianship and placement. Currently under civil commitment through the end of July.  Psychiatry is pursuing transfer patient to inpatient psychiatry unit.    Entered: Teja Cardona MD 06/13/2021, 12:49 PM        The patient's care was discussed with the Bedside Nurse and Patient.    Teja  MD Dulce  Hospitalist Service  [facility]    ______________________________________________________________________    Interval History   No new acute events.    Data reviewed today: I reviewed all medications, new labs and imaging results over the last 24 hours. I personally reviewed no images or EKG's today.    Physical Exam   Vital signs:  Temp: 97.4  F (36.3  C) Temp src: Oral BP: 97/52 Pulse: 85   Resp: 16 SpO2: 95 % O2 Device: None (Room air)   Height: 182.9 cm (6') Weight: 84.2 kg (185 lb 9.6 oz)  Estimated body mass index is 25.17 kg/m  as calculated from the following:    Height as of this encounter: 1.829 m (6').    Weight as of this encounter: 84.2 kg (185 lb 9.6 oz).      Wt Readings from Last 2 Encounters:   03/31/21 84.2 kg (185 lb 9.6 oz)   08/26/20 70.3 kg (155 lb)       Gen: Awake alert, difficult to accurately assess orientation, no obvious agitation or restlessness  Resp: CTA B/L, normal WOB  CVS: RRR, no murmur  Abd/GI: Soft, non-tender. BS- normoactive.    Skin: Warm, dry no rashes  Neuro- CN- intact. No focal deficits.        Data   No lab results found in last 7 days.    No results found for this or any previous visit (from the past 24 hour(s)).  Medications       aspirin  81 mg Oral Daily     atorvastatin  80 mg Oral At Bedtime     benztropine  1 mg Oral BID     divalproex sodium extended-release  500 mg Oral At Bedtime     docusate sodium  100 mg Oral BID     haloperidol  10 mg Oral TID     levothyroxine  88 mcg Oral Daily     memantine  5 mg Oral Daily     risperiDONE  2 mg Sublingual BID     salmeterol  1 puff Inhalation BID     vitamin E  800 Units Oral Daily

## 2021-06-13 NOTE — PLAN OF CARE
DATE & TIME: 6/12/21 1900-2330  Cognitive Concerns/ Orientation : Alert but confused, disoriented to time,situation.   BEHAVIOR & AGGRESSION TOOL COLOR: Green but has a history of yellow  ABNL VS/O2: Daily VSS on room air   MOBILITY: Independent in room, SBA in hallway.   PAIN MANAGMENT: Denies    DIET: Regular. Good appetite.   BOWEL/BLADDER: Continent. No BM today.  ABNL LAB/BG: COVID negative 6/9/21  DRAIN/DEVICES: No IV access  SKIN: Intact  D/C DATE: Discharge pending guardianship and placement. Commitment and court hold through 7/31/21  OTHER IMPORTANT INFO: Sitter at bedside. Calm and cooperative this shift.

## 2021-06-13 NOTE — PLAN OF CARE
DATE & TIME: 6/13/21 7-3pm  Cognitive Concerns/ Orientation : Alert but confused, disoriented to time,situation.   BEHAVIOR & AGGRESSION TOOL COLOR: Green but has a history of yellow  ABNL VS/O2: Daily VSS on room air   MOBILITY: Independent in room, SBA in hallway.   PAIN MANAGMENT: Denies    DIET: Regular. Good appetite.   BOWEL/BLADDER: Continent. No BM today.  ABNL LAB/BG: COVID negative 6/9/21  DRAIN/DEVICES: No IV access  SKIN: Intact  D/C DATE: Discharge pending guardianship and placement. Commitment and court hold through 7/31/21  OTHER IMPORTANT INFO: Sitter at bedside. Calm and cooperative most of shift, was walking with sitter down hallway & headed down stairs, sitter unable to bring him back so code 21 called, able to talk to John & bring him back to floor in wheelchair.

## 2021-06-14 PROCEDURE — 250N000013 HC RX MED GY IP 250 OP 250 PS 637: Performed by: INTERNAL MEDICINE

## 2021-06-14 PROCEDURE — 250N000013 HC RX MED GY IP 250 OP 250 PS 637: Performed by: PSYCHIATRY & NEUROLOGY

## 2021-06-14 PROCEDURE — 99231 SBSQ HOSP IP/OBS SF/LOW 25: CPT | Performed by: INTERNAL MEDICINE

## 2021-06-14 PROCEDURE — 120N000001 HC R&B MED SURG/OB

## 2021-06-14 RX ADMIN — HALOPERIDOL 10 MG: 5 TABLET ORAL at 21:44

## 2021-06-14 RX ADMIN — RISPERIDONE 2 MG: 1 TABLET, ORALLY DISINTEGRATING ORAL at 07:28

## 2021-06-14 RX ADMIN — DOCUSATE SODIUM 100 MG: 100 CAPSULE, LIQUID FILLED ORAL at 07:27

## 2021-06-14 RX ADMIN — DOCUSATE SODIUM 100 MG: 100 CAPSULE, LIQUID FILLED ORAL at 21:45

## 2021-06-14 RX ADMIN — SALMETEROL XINAFOATE 1 PUFF: 50 POWDER, METERED ORAL; RESPIRATORY (INHALATION) at 21:51

## 2021-06-14 RX ADMIN — Medication 800 UNITS: at 07:27

## 2021-06-14 RX ADMIN — DIVALPROEX SODIUM 500 MG: 500 TABLET, FILM COATED, EXTENDED RELEASE ORAL at 21:44

## 2021-06-14 RX ADMIN — BENZTROPINE MESYLATE 1 MG: 0.5 TABLET ORAL at 07:28

## 2021-06-14 RX ADMIN — SALMETEROL XINAFOATE 1 PUFF: 50 POWDER, METERED ORAL; RESPIRATORY (INHALATION) at 07:31

## 2021-06-14 RX ADMIN — HALOPERIDOL 10 MG: 5 TABLET ORAL at 07:27

## 2021-06-14 RX ADMIN — LEVOTHYROXINE SODIUM 88 MCG: 88 TABLET ORAL at 07:28

## 2021-06-14 RX ADMIN — ATORVASTATIN CALCIUM 80 MG: 40 TABLET, FILM COATED ORAL at 21:45

## 2021-06-14 RX ADMIN — LORAZEPAM 1 MG: 1 TABLET ORAL at 08:16

## 2021-06-14 RX ADMIN — MEMANTINE 5 MG: 5 TABLET ORAL at 07:28

## 2021-06-14 RX ADMIN — LORAZEPAM 1 MG: 1 TABLET ORAL at 18:03

## 2021-06-14 RX ADMIN — ASPIRIN 81 MG: 81 TABLET, COATED ORAL at 07:27

## 2021-06-14 RX ADMIN — BENZTROPINE MESYLATE 1 MG: 0.5 TABLET ORAL at 21:43

## 2021-06-14 RX ADMIN — HALOPERIDOL 10 MG: 5 TABLET ORAL at 14:52

## 2021-06-14 RX ADMIN — RISPERIDONE 2 MG: 1 TABLET, ORALLY DISINTEGRATING ORAL at 21:44

## 2021-06-14 ASSESSMENT — ACTIVITIES OF DAILY LIVING (ADL)
ADLS_ACUITY_SCORE: 13

## 2021-06-14 NOTE — PLAN OF CARE
DATE & TIME: 6/14/21 [id number]  Cognitive Concerns/ Orientation : Alert but confused, disoriented to time,situation.   BEHAVIOR & AGGRESSION TOOL COLOR: Green, but can switch to yellow quickly. Security knows pt and is able to help redirect as needed  ABNL VS/O2: Daily VSS on room air   MOBILITY: Independent in room, SBA in hallway.   PAIN MANAGMENT: Denies    DIET: Regular. Good appetite.   BOWEL/BLADDER: Continent. No BM since 6/10. Abdomen soft but rounded.   ABNL LAB/BG: COVID negative 6/9/21  DRAIN/DEVICES: No IV access  SKIN: Intact. Scattered bruising.   D/C DATE: Discharge pending guardianship and placement. Commitment and court hold through 7/31/21  OTHER IMPORTANT INFO: Psych re consulted today for follow up today. Long arm for elopement. PRN ativan given x 1 this shift.

## 2021-06-14 NOTE — PROGRESS NOTES
[facility]    Medicine Progress Note - Hospitalist Service        Date of Admission:  9/9/2020  6:29 AM    Assessment & Plan:   John Juárez is a 55 year old male with PMHx of schizophrenia, hx of TBI, alcohol use disorder, COPD, hyperlipidemia and hypothyroidism who was admitted on 9/9/2020 for evaluation of aggressive behavior at his group home.   Was evaluated by Psychiatry and his condition stabilized, though his group home was unwilling to take him back and thus hospitalization has been prolonged awaiting new placement and guardianship. Under civil commitment through [facility].  Noted increasingly agitated behavior and attempts at elopement in May 2021.     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  Brought to hospital for evaluation of aggressive behaviors. Group home now unwilling to take him back. Has had numerous CODE 21s called this stay. Seen by psych, meds adjusted. Is currently under civil commitment and court hold through [facility] until 7/31/21.  -Appreciate Psychiatry involvement  -Haldol increased to 10 mg TID on 5/23, because of increasing agitation.  -Benztropine 1mg BID for EPS.  -Valproic acid 500mg at bedtime.  -Door alarm, 1:1 as needed.  -SW following for placement, placement will occur once guardianship is established. The anticipated discharge is to [facility] which is a skilled nursing facility which has a secured male unit for patients with mental health diagnosis.  - 6/8/21 - consulted psychiatry again due to ongoing aggressive behaviors/hallucinations/delusions. See Dr. Murrieta's note from 6/8/21, appreciate his assistance.  - 6/9/21 - psychiatry saw patient again. Increased Risperdal to 2 mg PO BID. Pursuing transfer to inpatient psychiatric unit for ongoing care until guardian is appointed.  -Reconsult psychiatry for  6/15.     Chronic/Stable/Resolved  Possible Tardive Dyskinesia vs EPSE  Per psych assessment on 4/1/21, noted to have possible tardive dyskinesia vs extrapyramidal side effects of meds.  - At that time, recommended to increase Cogentin to 1mg BID and add vitamin E 800 international unit(s) daily.      Hyperlipidemia  - Chronic and stable on low dose ASA and atorvastatin.     COPD  - Not O2 dependent.  - Chronic and stable on salmeterol, albuterol prn.     Hypothyroidism  - Chronic and stable on levothyroxine.     Tobacco use disorder  - Using nicotine patch and PRN gum.     Hx of infective endocarditis with severe mitral and aortic regurgitation S/P bioprosthetic valve replacement (10/2015)  Systolic heart murmur  HFrEF, not in acute exacerbation  Follows with Dr. Dockery of Heart and Vascular Cardiology, last seen in 1/2020. Note heart murmur on exam, strongest in aortic region. No CP, SOB, dizziness, syncope. Echocardiogram in 5/2016 with EF 50%, no evidence for prosthetic mitral and aortic valve dysfunction.   - Follow-up with outpatient Cardiology upon dismissal from the hospital (on every 2 year follow-ups).  - Continue ASA.     Hx of CVA  - Note basal ganglia stroke in 2015. No focal neuro deficits aside from cognitive dysfunction as above.      Non-severe malnutrition  - Dietitian consulted.    Diet: Room Service  Diet  Combination Diet Regular Diet Adult; Safe Tray - with utensils  Snacks/Supplements Adult: Other; Bedtime snack; Between Meals  Room Service     DVT Prophylaxis: Pneumatic Compression Devices   Valentin Catheter: not present  Code Status: Full Code     Disposition Plan    Expected discharge: Awaiting guardianship and placement. Currently under civil commitment through the end of July.  Psychiatry is pursuing transfer patient to inpatient psychiatry unit.    Entered: Teja Cardona MD 06/14/2021, 12:17 PM        The patient's care was discussed with the Bedside Nurse and Patient.    Teja  MD Dulce  Hospitalist Service  [facility]    ______________________________________________________________________    Interval History   No acute events.  Intermittently does get agitated.  Code 21 had to be called yesterday but appears to be much calmer today.    Data reviewed today: I reviewed all medications, new labs and imaging results over the last 24 hours. I personally reviewed no images or EKG's today.    Physical Exam   Vital signs:  Temp: 98.2  F (36.8  C) Temp src: Oral BP: 108/70 Pulse: 71   Resp: 18 SpO2: 98 % O2 Device: None (Room air)   Height: 182.9 cm (6') Weight: 84.2 kg (185 lb 9.6 oz)  Estimated body mass index is 25.17 kg/m  as calculated from the following:    Height as of this encounter: 1.829 m (6').    Weight as of this encounter: 84.2 kg (185 lb 9.6 oz).      Wt Readings from Last 2 Encounters:   03/31/21 84.2 kg (185 lb 9.6 oz)   08/26/20 70.3 kg (155 lb)       Gen: Awake, alert, difficult to accurately assess orientation,, at the moment.  Resp: CTA B/L, normal WOB  CVS: RRR, no murmur  Abd/GI: Soft, non-tender. BS- normoactive.    Skin: Warm, dry no rashes  Neuro- CN- intact. No focal deficits.        Data   No lab results found in last 7 days.    No results found for this or any previous visit (from the past 24 hour(s)).  Medications       aspirin  81 mg Oral Daily     atorvastatin  80 mg Oral At Bedtime     benztropine  1 mg Oral BID     divalproex sodium extended-release  500 mg Oral At Bedtime     docusate sodium  100 mg Oral BID     haloperidol  10 mg Oral TID     levothyroxine  88 mcg Oral Daily     memantine  5 mg Oral Daily     risperiDONE  2 mg Sublingual BID     salmeterol  1 puff Inhalation BID     vitamin E  800 Units Oral Daily

## 2021-06-14 NOTE — PROGRESS NOTES
"John came out of his room \"I need a real \" I told him to go wait in his room and they will be up to talk to him. Aamir from Security came up and sat and talked to John. Ativan 1 mg given. Sitter at bedside. Continue to monitor closely.   "

## 2021-06-14 NOTE — PLAN OF CARE
DATE & TIME: 6/14/21 1900-0730  Cognitive Concerns/ Orientation : Alert but confused, disoriented to time,situation.   BEHAVIOR & AGGRESSION TOOL COLOR: Yellow, yelling and swearing this evening. Calm while asleep overnight.  ABNL VS/O2: Daily VSS on room air   MOBILITY: Independent in room, SBA in hallway.   PAIN MANAGMENT: Denies    DIET: Regular. Good appetite.   BOWEL/BLADDER: Continent. No BM since 6/10. Abdomen soft but rounded.   ABNL LAB/BG: COVID negative 6/9/21  DRAIN/DEVICES: No IV access  SKIN: Intact. Scattered bruising.   D/C DATE: Discharge pending guardianship and placement. Commitment and court hold through 7/31/21  OTHER IMPORTANT INFO: Sitter at bedside. Irritable in the evening. Swearing and yelling at sitter. Calmed down and slept through the night.

## 2021-06-14 NOTE — PROGRESS NOTES
Care Management Follow Up    Length of Stay (days): 268    Expected Discharge Date: 06/30/21     Concerns to be Addressed: on going need for Hospital based in-patient psychiatry transfer  guardianship and long term placement  Patient plan of care discussed at interdisciplinary rounds: Yes    Anticipated Discharge Disposition: (residential care home or nursing home)     Anticipated Discharge Services: None  Anticipated Discharge DME: None    Patient/family educated on Medicare website which has current facility and service quality ratings: (not applicable)  Education Provided on the Discharge Plan:    Patient/Family in Agreement with the Plan: no    Referrals Placed by CM/SW: Post Acute Facilities  Private pay costs discussed: Not applicable    Additional Information:  Contacted [facility], all their In-patient psychiatry units are at capacity.  Sent referrals to [facility] and [facility] located at [facility] in [place].  Will follow up with Dr Murrieta as to whether Dr Sorenson at [facility] will accept patient into one their adult psychiatry units.  Lulu Contreras, ASIMSW

## 2021-06-14 NOTE — PROGRESS NOTES
"Patient came out of room and requested the police be called, \"I haven't done nothing wrong\". Offered to call security, security contacted and came to the bedside. Patient returned to his bed. RN notified to give morning meds early. Continue to monitor.  "

## 2021-06-15 PROCEDURE — 250N000013 HC RX MED GY IP 250 OP 250 PS 637: Performed by: INTERNAL MEDICINE

## 2021-06-15 PROCEDURE — 250N000013 HC RX MED GY IP 250 OP 250 PS 637: Performed by: PSYCHIATRY & NEUROLOGY

## 2021-06-15 PROCEDURE — 99231 SBSQ HOSP IP/OBS SF/LOW 25: CPT | Performed by: INTERNAL MEDICINE

## 2021-06-15 PROCEDURE — 120N000001 HC R&B MED SURG/OB

## 2021-06-15 PROCEDURE — 250N000011 HC RX IP 250 OP 636: Performed by: INTERNAL MEDICINE

## 2021-06-15 PROCEDURE — 99232 SBSQ HOSP IP/OBS MODERATE 35: CPT | Performed by: PSYCHIATRY & NEUROLOGY

## 2021-06-15 RX ORDER — RISPERIDONE 1 MG/1
3 TABLET, ORALLY DISINTEGRATING ORAL 2 TIMES DAILY
Status: DISCONTINUED | OUTPATIENT
Start: 2021-06-15 | End: 2021-06-30 | Stop reason: HOSPADM

## 2021-06-15 RX ORDER — LORAZEPAM 0.5 MG/1
.5-1 TABLET ORAL EVERY 4 HOURS PRN
Status: DISCONTINUED | OUTPATIENT
Start: 2021-06-15 | End: 2021-06-30 | Stop reason: HOSPADM

## 2021-06-15 RX ADMIN — BENZTROPINE MESYLATE 1 MG: 0.5 TABLET ORAL at 08:54

## 2021-06-15 RX ADMIN — ATORVASTATIN CALCIUM 80 MG: 40 TABLET, FILM COATED ORAL at 21:38

## 2021-06-15 RX ADMIN — MEMANTINE 5 MG: 5 TABLET ORAL at 08:54

## 2021-06-15 RX ADMIN — RISPERIDONE 1 MG: 1 TABLET, ORALLY DISINTEGRATING ORAL at 17:05

## 2021-06-15 RX ADMIN — HALOPERIDOL LACTATE 10 MG: 5 INJECTION, SOLUTION INTRAMUSCULAR at 10:13

## 2021-06-15 RX ADMIN — BENZTROPINE MESYLATE 1 MG: 0.5 TABLET ORAL at 21:38

## 2021-06-15 RX ADMIN — RISPERIDONE 2 MG: 1 TABLET, ORALLY DISINTEGRATING ORAL at 08:53

## 2021-06-15 RX ADMIN — HALOPERIDOL 10 MG: 5 TABLET ORAL at 08:54

## 2021-06-15 RX ADMIN — HALOPERIDOL 10 MG: 5 TABLET ORAL at 14:34

## 2021-06-15 RX ADMIN — HALOPERIDOL 10 MG: 5 TABLET ORAL at 21:37

## 2021-06-15 RX ADMIN — DIVALPROEX SODIUM 500 MG: 500 TABLET, FILM COATED, EXTENDED RELEASE ORAL at 21:37

## 2021-06-15 RX ADMIN — DOCUSATE SODIUM 100 MG: 100 CAPSULE, LIQUID FILLED ORAL at 21:37

## 2021-06-15 RX ADMIN — RISPERIDONE 3 MG: 1 TABLET, ORALLY DISINTEGRATING ORAL at 21:37

## 2021-06-15 RX ADMIN — DOCUSATE SODIUM 100 MG: 100 CAPSULE, LIQUID FILLED ORAL at 08:53

## 2021-06-15 RX ADMIN — LEVOTHYROXINE SODIUM 88 MCG: 88 TABLET ORAL at 08:54

## 2021-06-15 RX ADMIN — Medication 800 UNITS: at 08:53

## 2021-06-15 RX ADMIN — SALMETEROL XINAFOATE 1 PUFF: 50 POWDER, METERED ORAL; RESPIRATORY (INHALATION) at 21:40

## 2021-06-15 RX ADMIN — ASPIRIN 81 MG: 81 TABLET, COATED ORAL at 08:53

## 2021-06-15 ASSESSMENT — ACTIVITIES OF DAILY LIVING (ADL)
ADLS_ACUITY_SCORE: 13

## 2021-06-15 NOTE — PLAN OF CARE
DATE & TIME: 6/14/21 1900-0730  Cognitive Concerns/ Orientation : Alert, disoriented to time & situation  BEHAVIOR & AGGRESSION TOOL COLOR: Green. Yellow hx  ABNL VS/O2: Daily VSS on RA  MOBILITY: Independent in room, SBA in hallway.   PAIN MANAGMENT: Denies    DIET: Regular. Good appetite.   BOWEL/BLADDER: Continent. No BM since 6/10  ABNL LAB/BG: COVID negative 6/9/21  DRAIN/DEVICES: No IV access  SKIN: Intact. Scattered bruising.   D/C DATE: Discharge pending guardianship and potential inpatient psych placement. SW following  OTHER IMPORTANT INFO: Sitter at bedside during evening, door alarm overnight. Commitment and court hold through 7/31/21

## 2021-06-15 NOTE — PLAN OF CARE
DATE & TIME: 6/15/21 Day shift  Cognitive Concerns/ Orientation : Alert, disoriented to time & situation  BEHAVIOR & AGGRESSION TOOL COLOR: Green. Yellow hx  ABNL VS/O2: Daily VSS on RA  MOBILITY: Independent in room, SBA in hallway.   PAIN MANAGMENT: Denies    DIET: Regular. Good appetite.   BOWEL/BLADDER: Continent.   ABNL LAB/BG: COVID negative 6/9/21  DRAIN/DEVICES: No IV access  SKIN: Intact. Scattered bruising, scab on L shin  D/C DATE: Discharge pending guardianship and potential inpatient psych placement. SW following  OTHER IMPORTANT INFO: Sitter at bedside, door alarm in place. Commitment and court hold through 7/31/21

## 2021-06-15 NOTE — CONSULTS
"Patient seen this morning, full note to follow      [facility] Psychiatric Consult Progress Note    Interval History:   Pt seen, chart reviewed, case discussed with nursing staff. Nursing reporting continued episodic agitation and aggresssive, posturing behaviors at time. Ativan effective for alleviation of agitation but renders him ataxic and a fall risk. On interview he is sleeping and briefly arouses for questions. Denies any concerns yet this morning. He denies pain. Denies feeling depressed or anxious. Denies hallucinations or paraonia. Reports intact appetite and sleeping well. He denies SI/HI.      Review of systems:   10 point Review of Systems completed by Dr. Murrieta , and is  is negative other than noted in the HPI     Medications:       aspirin  81 mg Oral Daily     atorvastatin  80 mg Oral At Bedtime     benztropine  1 mg Oral BID     divalproex sodium extended-release  500 mg Oral At Bedtime     docusate sodium  100 mg Oral BID     haloperidol  10 mg Oral TID     levothyroxine  88 mcg Oral Daily     memantine  5 mg Oral Daily     risperiDONE  3 mg Sublingual BID     salmeterol  1 puff Inhalation BID     vitamin E  800 Units Oral Daily     acetaminophen, albuterol, alum & mag hydroxide-simethicone, haloperidol lactate, LORazepam, melatonin, naloxone **OR** naloxone **OR** naloxone **OR** naloxone, nicotine, nicotine, ondansetron **OR** ondansetron, polyethylene glycol, polyethylene glycol-propylene glycol, risperiDONE    Mental Status Examination:     Appearance: He is sleeping, wakes with voice, able to answer breifly but not choosing to wake up further to engage  Eye Contact: remain closed   speech:  Impoverished, dysarthric, monotone, speaks in a whisper, slightly louder when he gets frustrated  language: Poor  Psychomotor Behavior:  some buccal-lingual dyskinesia, chewing movements  Mood: \"fine\"  affect: Blunted   thought Process:  paucity of speech, no obvious loosening of " "associations flight of ideas or formal thought disorder  Thought Content:  no evidence of suicidal ideation or homicidal ideation, but he appears paranoid, making delusional statements frequently when more awake and tlaking    Oriented to: x1  Recent and Remote Memory:  limited  Fund of Knowledge: delayed  Insight:  limited  Judgment:  limited         Labs/Vitals:   No results found for this or any previous visit (from the past 24 hour(s)).  B/P: 105/74, T: 97.4, P: 72, R: 18  Impression  John is a 55-year-old gentleman with a known major neurocognitive disorder and schizophrenic spectrum illness.  He continues to show significant impairment, poor insight, thus far not responding predictably to medications.  He might be a candidate for Clozaril given his level of aggression and refractory symptoms.  I do think he would be better served on an inpatient psychiatric unit that can meet his needs.  I am going to do my best to coordinate this with the administration at [facility], I placed a call to Dr. Sorenson the director.  I am also going to look into whether we can extend his Yanez order to include \"any neuroleptic\" given the challenges we are facing stabilizing him.  He has essentially been failing Risperdal, Zyprexa, Haldol, and the only other medicine listed is Invega.     1.  Schizophrenia, unspecified  2.  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  Alcohol use disorder in remission.   4.  Stimulant use disorder, in remission.   5.  Chronic obstructive pulmonary disease.   6.  Hypertension.   7. R/O mild TD vs EPSE    1.  We will adjust Risperdal to 3 mg twice daily   --switch Ativan to 0.5-1 mg, with 1 mg reserved for severe agitation and 0.5 mg for perhaps elevating levels of anxiety/agitation prior to it becoming severe. 1 mg is effective but causes some ataxia so perhaps a 0.5 mg when he is not severely agitated will be better tolerated.   2.  Will engage " administration with discussions about trying to get him moved to an inpatient psychiatric unit at [place], station 12 would be appropriate given his unpredictable behavior.  Maintaining him on a medical unit presents significant challenges, and potential safety issues because he is difficult to contain because of agitated behavior   3.  We are trying to get  Clozaril added to Yanez as this is likely our next step if Risperidal 3 mg BID is not sufficiently effective   4.  Continue commitment, Beau, pursuit of guardianship      Attestation:  Patient has been seen and evaluated by me,  Lio Murrieta MD

## 2021-06-15 NOTE — PROGRESS NOTES
[facility]  Hospitalist Progress Note  Sushil Morales MD  06/15/2021    Assessment & Plan   John Juárez is a 55 year old male with PMHx of schizophrenia, hx of TBI, alcohol use disorder, COPD, hyperlipidemia and hypothyroidism who was admitted on 9/9/2020 for evaluation of aggressive behavior at his group home.   Was evaluated by Psychiatry and his condition stabilized, though his group home was unwilling to take him back and thus hospitalization has been prolonged awaiting new placement and guardianship. Under civil commitment through [facility].  Noted increasingly agitated behavior and attempts at elopement in May 2021.     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  Brought to hospital for evaluation of aggressive behaviors. Group home now unwilling to take him back. Has had numerous CODE 21s called this stay. Seen by psych, meds adjusted. Is currently under civil commitment and court hold through [facility] until 7/31/21.  -Appreciate Psychiatry involvement  -Haldol increased to 10 mg TID on 5/23, because of increasing agitation.  -Benztropine 1mg BID for EPS.  -Valproic acid 500mg at bedtime.  -Door alarm, 1:1 as needed.  -SW following for placement, placement will occur once guardianship is established. The anticipated discharge is to [facility] which is a skilled nursing facility which has a secured male unit for patients with mental health diagnosis.  - 6/8/21 - consulted psychiatry again due to ongoing aggressive behaviors/hallucinations/delusions. See Dr. Murrieta's note from 6/8/21, appreciate his assistance.  - 6/9/21 - psychiatry saw patient again. Increased Risperdal to 2 mg PO BID. Pursuing transfer to inpatient psychiatric unit for ongoing care until guardian is appointed.  -Reconsult psychiatry for  6/15.     Chronic/Stable/Resolved  Possible Tardive Dyskinesia vs EPSE  Per psych assessment on 4/1/21, noted to have possible tardive dyskinesia vs extrapyramidal side effects of meds.  - At that time, recommended to increase Cogentin to 1mg BID and add vitamin E 800 international unit(s) daily.      Hyperlipidemia  - Chronic and stable on low dose ASA and atorvastatin.     COPD  - Not O2 dependent.  - Chronic and stable on salmeterol, albuterol prn.     Hypothyroidism  - Chronic and stable on levothyroxine.     Tobacco use disorder  - Using nicotine patch and PRN gum.     Hx of infective endocarditis with severe mitral and aortic regurgitation S/P bioprosthetic valve replacement (10/2015)  Systolic heart murmur  HFrEF, not in acute exacerbation  Follows with Dr. Dockery of Heart and Vascular Cardiology, last seen in 1/2020. Note heart murmur on exam, strongest in aortic region. No CP, SOB, dizziness, syncope. Echocardiogram in 5/2016 with EF 50%, no evidence for prosthetic mitral and aortic valve dysfunction.   - Follow-up with outpatient Cardiology upon dismissal from the hospital (on every 2 year follow-ups).  - Continue ASA.     Hx of CVA  - Note basal ganglia stroke in 2015. No focal neuro deficits aside from cognitive dysfunction as above.      Non-severe malnutrition  - Dietitian consulted.     Diet: Room Service  Diet  Combination Diet Regular Diet Adult; Safe Tray - with utensils  Snacks/Supplements Adult: Other; Bedtime snack; Between Meals  Room Service     DVT Prophylaxis: Pneumatic Compression Devices   Valentin Catheter: not present  Code Status: Full Code        Disposition Plan    Interval History   -- chart reviewed  -- no acute issues  -- showered     -Data reviewed today: I reviewed all new labs and imaging over the last 24 hours. I personally reviewed no images or EKG's today.    Physical Exam    , Blood pressure 105/74, pulse 72, temperature 97.4  F (36.3  C), temperature source Oral, resp. rate  18, height 1.829 m (6'), weight 84.2 kg (185 lb 9.6 oz), SpO2 98 %.  Vitals:    12/11/20 0700 03/19/21 2120 03/31/21 1700   Weight: 82.6 kg (182 lb) 84.4 kg (186 lb) 84.2 kg (185 lb 9.6 oz)     Vital Signs with Ranges  Temp:  [97.4  F (36.3  C)] 97.4  F (36.3  C)  Pulse:  [72] 72  Resp:  [18] 18  BP: (105)/(74) 105/74  SpO2:  [98 %] 98 %  I/O's Last 24 hours  No intake/output data recorded.    Constitutional: Awake, alert, cooperative, no apparent distress  Respiratory: Clear to auscultation bilaterally, no crackles or wheezing  Cardiovascular: Regular rate and rhythm, normal S1 and S2, and no murmur noted  GI: Normal bowel sounds, soft, non-distended, non-tender  Skin/Integumen: No rashes, no cyanosis, no edema  Other:      Medications   All medications were reviewed.      aspirin  81 mg Oral Daily     atorvastatin  80 mg Oral At Bedtime     benztropine  1 mg Oral BID     divalproex sodium extended-release  500 mg Oral At Bedtime     docusate sodium  100 mg Oral BID     haloperidol  10 mg Oral TID     levothyroxine  88 mcg Oral Daily     memantine  5 mg Oral Daily     risperiDONE  2 mg Sublingual BID     salmeterol  1 puff Inhalation BID     vitamin E  800 Units Oral Daily        Data   No lab results found in last 7 days.    Invalid input(s): TROP, TROPONINIES    No results found for this or any previous visit (from the past 24 hour(s)).    Sushil Morales MD  Text Page  (7am to 6pm)

## 2021-06-16 PROCEDURE — 250N000013 HC RX MED GY IP 250 OP 250 PS 637: Performed by: PSYCHIATRY & NEUROLOGY

## 2021-06-16 PROCEDURE — 99233 SBSQ HOSP IP/OBS HIGH 50: CPT | Performed by: INTERNAL MEDICINE

## 2021-06-16 PROCEDURE — 120N000001 HC R&B MED SURG/OB

## 2021-06-16 PROCEDURE — 250N000013 HC RX MED GY IP 250 OP 250 PS 637: Performed by: INTERNAL MEDICINE

## 2021-06-16 RX ADMIN — DOCUSATE SODIUM 100 MG: 100 CAPSULE, LIQUID FILLED ORAL at 08:17

## 2021-06-16 RX ADMIN — MEMANTINE 5 MG: 5 TABLET ORAL at 08:17

## 2021-06-16 RX ADMIN — DIVALPROEX SODIUM 500 MG: 500 TABLET, FILM COATED, EXTENDED RELEASE ORAL at 20:17

## 2021-06-16 RX ADMIN — RISPERIDONE 3 MG: 1 TABLET, ORALLY DISINTEGRATING ORAL at 20:17

## 2021-06-16 RX ADMIN — HALOPERIDOL 10 MG: 5 TABLET ORAL at 20:17

## 2021-06-16 RX ADMIN — LEVOTHYROXINE SODIUM 88 MCG: 88 TABLET ORAL at 08:17

## 2021-06-16 RX ADMIN — BENZTROPINE MESYLATE 1 MG: 0.5 TABLET ORAL at 08:17

## 2021-06-16 RX ADMIN — BENZTROPINE MESYLATE 1 MG: 0.5 TABLET ORAL at 20:17

## 2021-06-16 RX ADMIN — HALOPERIDOL 10 MG: 5 TABLET ORAL at 13:33

## 2021-06-16 RX ADMIN — HALOPERIDOL 10 MG: 5 TABLET ORAL at 08:17

## 2021-06-16 RX ADMIN — ASPIRIN 81 MG: 81 TABLET, COATED ORAL at 08:17

## 2021-06-16 RX ADMIN — ALUMINUM HYDROXIDE, MAGNESIUM HYDROXIDE, AND SIMETHICONE 30 ML: 200; 200; 20 SUSPENSION ORAL at 08:16

## 2021-06-16 RX ADMIN — DOCUSATE SODIUM 100 MG: 100 CAPSULE, LIQUID FILLED ORAL at 20:17

## 2021-06-16 RX ADMIN — RISPERIDONE 3 MG: 1 TABLET, ORALLY DISINTEGRATING ORAL at 08:16

## 2021-06-16 RX ADMIN — RISPERIDONE 1 MG: 1 TABLET, ORALLY DISINTEGRATING ORAL at 13:33

## 2021-06-16 RX ADMIN — ATORVASTATIN CALCIUM 80 MG: 40 TABLET, FILM COATED ORAL at 20:17

## 2021-06-16 RX ADMIN — LORAZEPAM 1 MG: 0.5 TABLET ORAL at 17:49

## 2021-06-16 RX ADMIN — SALMETEROL XINAFOATE 1 PUFF: 50 POWDER, METERED ORAL; RESPIRATORY (INHALATION) at 20:22

## 2021-06-16 RX ADMIN — Medication 800 UNITS: at 08:17

## 2021-06-16 RX ADMIN — SALMETEROL XINAFOATE 1 PUFF: 50 POWDER, METERED ORAL; RESPIRATORY (INHALATION) at 08:17

## 2021-06-16 ASSESSMENT — ACTIVITIES OF DAILY LIVING (ADL)
ADLS_ACUITY_SCORE: 13

## 2021-06-16 NOTE — PLAN OF CARE
DATE & TIME: 6/15/21 4510-3438  Cognitive Concerns/ Orientation : Alert, disoriented to time & situation  BEHAVIOR & AGGRESSION TOOL COLOR: Green. Yellow hx  ABNL VS/O2: Daily VSS on RA  MOBILITY: Independent in room, SBA in hallway.   PAIN MANAGMENT: Denies    DIET: Regular. Good appetite.   BOWEL/BLADDER: Continent - no BM since 6/10  ABNL LAB/BG: COVID negative 6/9/21  DRAIN/DEVICES: No IV access  SKIN: Intact. Scattered bruising, scab on L shin  D/C DATE: Discharge pending guardianship and potential inpatient psych placement. SW following  OTHER IMPORTANT INFO: Sitter at bedside, door alarm in place. PRN Risperidone x1 for agitation. Commitment and court hold through 7/31/21

## 2021-06-16 NOTE — PLAN OF CARE
DATE & TIME: 6/15/21 1399-7304  Cognitive Concerns/ Orientation : Alert, disoriented to time & situation. Garbled, slow speech. Flat affect. Cooperative overnight, stayed in room  BEHAVIOR & AGGRESSION TOOL COLOR: Green. Yellow hx  ABNL VS/O2: Daily VSS on RA  MOBILITY: Independent in room, SBA in hallway.   PAIN MANAGMENT: Denies    DIET: Regular. Good appetite.   BOWEL/BLADDER: Continent, up to BR (no BM documented since 6/10)  ABNL LAB/BG: COVID negative 6/9/21  DRAIN/DEVICES: No IV access  SKIN: Scattered bruising, scab on L shin  D/C DATE: Discharge pending guardianship and potential inpatient psych placement. SW following  OTHER IMPORTANT INFO: Most recent psych consult completed: 6/15. Sitter at bedside, door alarm in place. Commitment and court hold through 7/31/21

## 2021-06-16 NOTE — PLAN OF CARE
DATE & TIME: 6/16/21 Day shift  Cognitive Concerns/ Orientation : Alert, disoriented to time & situation. Garbled, slow speech. Flat affect. Cooperative, had a few loud slamming door episodes   BEHAVIOR & AGGRESSION TOOL COLOR: Green. Yellow hx  ABNL VS/O2: Daily VSS on RA  MOBILITY: Independent in room, SBA in hallway.   PAIN MANAGMENT: Denies    DIET: Regular. Good appetite.   BOWEL/BLADDER: Continent, up to BR  ABNL LAB/BG: COVID negative 6/9/21  DRAIN/DEVICES: No IV access  SKIN: Scattered bruising, scab on L shin  D/C DATE: Discharge pending guardianship and potential inpatient psych placement. SW following  OTHER IMPORTANT INFO: Most recent psych consult completed: 6/15. Sitter at bedside, door alarm in place. Commitment and court hold through 7/31/21. Gave Risperdone PRN x1 with scheduled Haldolol. Scheduled risperodone increased to 3mg BID yesterday. Continue to monitor.

## 2021-06-16 NOTE — PROGRESS NOTES
[facility]  Hospitalist Progress Note  Sushil Morales MD  06/16/2021    Assessment & Plan   John Juárez is a 55 year old male with PMHx of schizophrenia, hx of TBI, alcohol use disorder, COPD, hyperlipidemia and hypothyroidism who was admitted on 9/9/2020 for evaluation of aggressive behavior at his group home.   Was evaluated by Psychiatry and his condition stabilized, though his group home was unwilling to take him back and thus hospitalization has been prolonged awaiting new placement and guardianship. Under civil commitment through [facility].  Noted increasingly agitated behavior and attempts at elopement in May 2021.     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  Brought to hospital for evaluation of aggressive behaviors. Group home now unwilling to take him back. Has had numerous CODE 21s called this stay. Seen by psych, meds adjusted. Is currently under civil commitment and court hold through [facility] until 7/31/21.  -Appreciate Psychiatry involvement  -Haldol increased to 10 mg TID on 5/23, because of increasing agitation.  -Benztropine 1mg BID for EPS.  -Valproic acid 500mg at bedtime.  -Door alarm, 1:1 as needed.  -SW following for placement, placement will occur once guardianship is established. The anticipated discharge is to [facility] which is a skilled nursing facility which has a secured male unit for patients with mental health diagnosis.  - 6/8/21 - consulted psychiatry again due to ongoing aggressive behaviors/hallucinations/delusions. See Dr. Murrieta's note from 6/8/21, appreciate his assistance.  - 6/9/21 - psychiatry saw patient again. Increased Risperdal to 3 mg PO BID. Pursuing transfer to inpatient psychiatric unit for ongoing care until guardian is appointed.  -Reconsulted psychiatry for 6/15, consult noted,  possible trial for clozaril added to his Yanez.     Chronic/Stable/Resolved  Possible Tardive Dyskinesia vs EPSE  Per psych assessment on 4/1/21, noted to have possible tardive dyskinesia vs extrapyramidal side effects of meds.  - At that time, recommended to increase Cogentin to 1mg BID and add vitamin E 800 international unit(s) daily.      Hyperlipidemia  - Chronic and stable on low dose ASA and atorvastatin.     COPD  - Not O2 dependent.  - Chronic and stable on salmeterol, albuterol prn.     Hypothyroidism  - Chronic and stable on levothyroxine.     Tobacco use disorder  - Using nicotine patch and PRN gum.     Hx of infective endocarditis with severe mitral and aortic regurgitation S/P bioprosthetic valve replacement (10/2015)  Systolic heart murmur  HFrEF, not in acute exacerbation  Follows with Dr. Dockery of Heart and Vascular Cardiology, last seen in 1/2020. Note heart murmur on exam, strongest in aortic region. No CP, SOB, dizziness, syncope. Echocardiogram in 5/2016 with EF 50%, no evidence for prosthetic mitral and aortic valve dysfunction.   - Follow-up with outpatient Cardiology upon dismissal from the hospital (on every 2 year follow-ups).  - Continue ASA.     Hx of CVA  - Note basal ganglia stroke in 2015. No focal neuro deficits aside from cognitive dysfunction as above.      Non-severe malnutrition  - Dietitian consulted.     Diet: Room Service  Diet  Combination Diet Regular Diet Adult; Safe Tray - with utensils  Snacks/Supplements Adult: Other; Bedtime snack; Between Meals  Room Service     DVT Prophylaxis: Pneumatic Compression Devices   Valentin Catheter: not present  Code Status: Full Code        Disposition Plan  - when bed is available.    Interval History   -- chart reviewed  -- psychiatry note reviewed  -- discussed with RN  -- patient seen, agitated, ambulatory    -Data reviewed today: I reviewed all new labs and imaging over the last 24 hours. I personally reviewed no images or EKG's  today.    Physical Exam    , Blood pressure 103/66, pulse 100, temperature 97.5  F (36.4  C), temperature source Oral, resp. rate 18, height 1.829 m (6'), weight 84.2 kg (185 lb 9.6 oz), SpO2 97 %.  Vitals:    12/11/20 0700 03/19/21 2120 03/31/21 1700   Weight: 82.6 kg (182 lb) 84.4 kg (186 lb) 84.2 kg (185 lb 9.6 oz)     Vital Signs with Ranges  Temp:  [97.5  F (36.4  C)] 97.5  F (36.4  C)  Pulse:  [100] 100  Resp:  [18] 18  BP: (103)/(66) 103/66  SpO2:  [97 %] 97 %  I/O's Last 24 hours  I/O last 3 completed shifts:  In: 840 [P.O.:840]  Out: -     Constitutional: Awake,  no apparent distress  Respiratory: Clear to auscultation bilaterally, no crackles or wheezing  Cardiovascular: Regular rate and rhythm, normal S1 and S2, and no murmur noted  GI: Normal bowel sounds, soft, non-distended, non-tender  Skin/Integumen: No rashes, no cyanosis, no edema  Other:      Medications   All medications were reviewed.      aspirin  81 mg Oral Daily     atorvastatin  80 mg Oral At Bedtime     benztropine  1 mg Oral BID     divalproex sodium extended-release  500 mg Oral At Bedtime     docusate sodium  100 mg Oral BID     haloperidol  10 mg Oral TID     levothyroxine  88 mcg Oral Daily     memantine  5 mg Oral Daily     risperiDONE  3 mg Sublingual BID     salmeterol  1 puff Inhalation BID     vitamin E  800 Units Oral Daily        Data   No lab results found in last 7 days.    Invalid input(s): TROP, TROPONINIES    No results found for this or any previous visit (from the past 24 hour(s)).    Sushil Morales MD  Text Page  (7am to 6pm)

## 2021-06-17 PROCEDURE — 250N000013 HC RX MED GY IP 250 OP 250 PS 637: Performed by: PSYCHIATRY & NEUROLOGY

## 2021-06-17 PROCEDURE — 99231 SBSQ HOSP IP/OBS SF/LOW 25: CPT | Performed by: INTERNAL MEDICINE

## 2021-06-17 PROCEDURE — 250N000013 HC RX MED GY IP 250 OP 250 PS 637: Performed by: INTERNAL MEDICINE

## 2021-06-17 PROCEDURE — 120N000001 HC R&B MED SURG/OB

## 2021-06-17 PROCEDURE — 87635 SARS-COV-2 COVID-19 AMP PRB: CPT | Performed by: INTERNAL MEDICINE

## 2021-06-17 RX ORDER — CHOLECALCIFEROL (VITAMIN D3) 1250 MCG
1250 CAPSULE ORAL
Status: DISCONTINUED | OUTPATIENT
Start: 2021-06-17 | End: 2021-06-30 | Stop reason: HOSPADM

## 2021-06-17 RX ADMIN — ATORVASTATIN CALCIUM 80 MG: 40 TABLET, FILM COATED ORAL at 21:22

## 2021-06-17 RX ADMIN — LORAZEPAM 1 MG: 0.5 TABLET ORAL at 12:00

## 2021-06-17 RX ADMIN — BENZTROPINE MESYLATE 1 MG: 0.5 TABLET ORAL at 08:11

## 2021-06-17 RX ADMIN — MEMANTINE 5 MG: 5 TABLET ORAL at 08:11

## 2021-06-17 RX ADMIN — HALOPERIDOL 10 MG: 5 TABLET ORAL at 13:57

## 2021-06-17 RX ADMIN — DOCUSATE SODIUM 100 MG: 100 CAPSULE, LIQUID FILLED ORAL at 08:11

## 2021-06-17 RX ADMIN — BENZTROPINE MESYLATE 1 MG: 0.5 TABLET ORAL at 21:22

## 2021-06-17 RX ADMIN — ASPIRIN 81 MG: 81 TABLET, COATED ORAL at 08:11

## 2021-06-17 RX ADMIN — DIVALPROEX SODIUM 500 MG: 500 TABLET, FILM COATED, EXTENDED RELEASE ORAL at 21:22

## 2021-06-17 RX ADMIN — RISPERIDONE 1 MG: 1 TABLET, ORALLY DISINTEGRATING ORAL at 10:12

## 2021-06-17 RX ADMIN — SALMETEROL XINAFOATE 1 PUFF: 50 POWDER, METERED ORAL; RESPIRATORY (INHALATION) at 12:00

## 2021-06-17 RX ADMIN — DOCUSATE SODIUM 100 MG: 100 CAPSULE, LIQUID FILLED ORAL at 21:22

## 2021-06-17 RX ADMIN — RISPERIDONE 3 MG: 1 TABLET, ORALLY DISINTEGRATING ORAL at 21:22

## 2021-06-17 RX ADMIN — CHOLECALCIFEROL CAP 1.25 MG (50000 UNIT) 1250 MCG: 1.25 CAP at 13:57

## 2021-06-17 RX ADMIN — HALOPERIDOL 10 MG: 5 TABLET ORAL at 08:10

## 2021-06-17 RX ADMIN — HALOPERIDOL 10 MG: 5 TABLET ORAL at 21:22

## 2021-06-17 RX ADMIN — RISPERIDONE 3 MG: 1 TABLET, ORALLY DISINTEGRATING ORAL at 08:10

## 2021-06-17 RX ADMIN — Medication 800 UNITS: at 08:11

## 2021-06-17 RX ADMIN — LEVOTHYROXINE SODIUM 88 MCG: 88 TABLET ORAL at 08:11

## 2021-06-17 RX ADMIN — SALMETEROL XINAFOATE 1 PUFF: 50 POWDER, METERED ORAL; RESPIRATORY (INHALATION) at 21:23

## 2021-06-17 RX ADMIN — Medication 1 LOZENGE: at 16:20

## 2021-06-17 ASSESSMENT — ACTIVITIES OF DAILY LIVING (ADL)
ADLS_ACUITY_SCORE: 13

## 2021-06-17 NOTE — PROGRESS NOTES
[facility]  Hospitalist Progress Note  Sushil Morales MD  06/17/2021    Assessment & Plan   John Juárez is a 55 year old male with PMHx of schizophrenia, hx of TBI, alcohol use disorder, COPD, hyperlipidemia and hypothyroidism who was admitted on 9/9/2020 for evaluation of aggressive behavior at his group home.   Was evaluated by Psychiatry and his condition stabilized, though his group home was unwilling to take him back and thus hospitalization has been prolonged awaiting new placement and guardianship. Under civil commitment through [facility].  Noted increasingly agitated behavior and attempts at elopement in May 2021.     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  Brought to hospital for evaluation of aggressive behaviors. Group home now unwilling to take him back. Has had numerous CODE 21s called this stay. Seen by psych, meds adjusted. Is currently under civil commitment and court hold through [facility] until 7/31/21.  -Appreciate Psychiatry involvement  -Haldol increased to 10 mg TID on 5/23, because of increasing agitation.  -Benztropine 1mg BID for EPS.  -Valproic acid 500mg at bedtime.  -Door alarm, 1:1 as needed.  -SW following for placement, placement will occur once guardianship is established. The anticipated discharge is to [facility] which is a skilled nursing facility which has a secured male unit for patients with mental health diagnosis.  - 6/8/21 - consulted psychiatry again due to ongoing aggressive behaviors/hallucinations/delusions. See Dr. Murrieta's note from 6/8/21, appreciate his assistance.  - 6/9/21 - psychiatry saw patient again. Increased Risperdal to 3 mg PO BID. Pursuing transfer to inpatient psychiatric unit for ongoing care until guardian is appointed.  -Reconsulted psychiatry for 6/15, consult noted,  possible trial for clozaril added to his Yanez.     Chronic/Stable/Resolved  Possible Tardive Dyskinesia vs EPSE  Per psych assessment on 4/1/21, noted to have possible tardive dyskinesia vs extrapyramidal side effects of meds.  - At that time, recommended to increase Cogentin to 1mg BID and add vitamin E 800 international unit(s) daily.      Hyperlipidemia  - Chronic and stable on low dose ASA and atorvastatin.     COPD  - Not O2 dependent.  - Chronic and stable on salmeterol, albuterol prn.     Hypothyroidism  - Chronic and stable on levothyroxine.     Tobacco use disorder  - Using nicotine patch and PRN gum.     Hx of infective endocarditis with severe mitral and aortic regurgitation S/P bioprosthetic valve replacement (10/2015)  Systolic heart murmur  HFrEF, not in acute exacerbation  Follows with Dr. Dockery of Heart and Vascular Cardiology, last seen in 1/2020. Note heart murmur on exam, strongest in aortic region. No CP, SOB, dizziness, syncope. Echocardiogram in 5/2016 with EF 50%, no evidence for prosthetic mitral and aortic valve dysfunction.   - Follow-up with outpatient Cardiology upon dismissal from the hospital (on every 2 year follow-ups).  - Continue ASA.     Hx of CVA  - Note basal ganglia stroke in 2015. No focal neuro deficits aside from cognitive dysfunction as above.     Non-severe malnutrition  - Dietitian consulted.     Diet: Room Service  Combination Diet Regular Diet Adult; Safe Tray - with utensils  Snacks/Supplements Adult: Other; Bedtime snack; Between Meals  Room Service     DVT Prophylaxis: ambulate  Valentin Catheter: not present  Code Status: Full Code        Disposition Plan  - when bed is available.    Interval History   -- no acute overnight issues  -- ambulatory and calm    -Data reviewed today: I reviewed all new labs and imaging over the last 24 hours. I personally reviewed no images or EKG's today.    Physical Exam    , Blood pressure 103/66, pulse 100, temperature 97.5  F (36.4   C), temperature source Oral, resp. rate 18, height 1.829 m (6'), weight 84.2 kg (185 lb 9.6 oz), SpO2 97 %.  Vitals:    12/11/20 0700 03/19/21 2120 03/31/21 1700   Weight: 82.6 kg (182 lb) 84.4 kg (186 lb) 84.2 kg (185 lb 9.6 oz)     Vital Signs with Ranges     I/O's Last 24 hours  I/O last 3 completed shifts:  In: 840 [P.O.:840]  Out: -     Constitutional: Awake,  no apparent distress  Respiratory: Clear to auscultation bilaterally, no crackles or wheezing  Cardiovascular: Regular rate and rhythm, normal S1 and S2, and no murmur noted  GI: Normal bowel sounds, soft, non-distended, non-tender  Skin/Integumen: No rashes, no cyanosis, no edema  Other:      Medications   All medications were reviewed.      aspirin  81 mg Oral Daily     atorvastatin  80 mg Oral At Bedtime     benztropine  1 mg Oral BID     divalproex sodium extended-release  500 mg Oral At Bedtime     docusate sodium  100 mg Oral BID     haloperidol  10 mg Oral TID     levothyroxine  88 mcg Oral Daily     memantine  5 mg Oral Daily     risperiDONE  3 mg Sublingual BID     salmeterol  1 puff Inhalation BID     vitamin E  800 Units Oral Daily        Data   No lab results found in last 7 days.    Invalid input(s): TROP, TROPONINIES    No results found for this or any previous visit (from the past 24 hour(s)).    Sushil Morales MD  Text Page  (7am to 6pm)

## 2021-06-17 NOTE — PLAN OF CARE
DATE & TIME: 6/16/21 8008-4062  Cognitive Concerns/ Orientation : A&Ox2, disoriented to time & situation. Garbled, slow speech. Flat affect. Agitated at times.   BEHAVIOR & AGGRESSION TOOL COLOR: Green. Yellow hx  ABNL VS/O2: Daily VSS on RA  MOBILITY: Independent in room, SBA in hallway.   PAIN MANAGMENT: Denies    DIET: Regular. Good appetite.   BOWEL/BLADDER: Continent, up to BR. Bm YESTERDAY PER Pt.   ABNL LAB/BG: COVID negative 6/9/21  DRAIN/DEVICES: No IV access  SKIN: Scattered bruising, scab on L shin  D/C DATE: Discharge pending guardianship and potential inpatient psych placement. SW following  OTHER IMPORTANT INFO: Most recent psych consult completed on 6/15. Sitter at bedside, door alarm in place. Commitment and court hold through 7/31/21. Scheduled risperodone increased to 3mg BID yesterday. Pt agitated and wanting to leave, PRN Ativan 1 mg given.

## 2021-06-17 NOTE — PLAN OF CARE
DATE & TIME: 6/17/21 9385-7168  Cognitive Concerns/ Orientation : A&O x2, disoriented to time & situation. Garbled, slow speech. Flat affect.   BEHAVIOR & AGGRESSION TOOL COLOR: Yellow  ABNL VS/O2: Daily VSS on RA  MOBILITY: Independent in room, SBA in hallway; went for several walks  PAIN MANAGMENT: Sore throat; Chloraseptic lozenge given X1 for throat pain.   DIET: Regular. Good appetite.   BOWEL/BLADDER: Continent, up to BR  ABNL LAB/BG: COVID negative 6/17/21  DRAIN/DEVICES: No IV access  SKIN: Scattered bruising, scab on L shin  D/C DATE: Discharge pending guardianship and potential inpatient psych placement. SW following  OTHER IMPORTANT INFO: Most recent psych consult completed on 6/15. sitter and door alarm in place. Commitment and court hold through 7/31/21.

## 2021-06-17 NOTE — CONSULTS
9/9/2020  [facility] Consult and Thanh Juárez 1965     Psychiatry consult acknowledged via liason service.  attempted to see John and he was not able to be met with via ipad. Consulted with Dr. Batres via phone. C&L coverage for medication management is limited today. Noting the patient was seen 2 days ago by psychiatry and there have not been acute issues since then, C&L team will follow up tomorrow when coverage is more available.     Diane Mckeon, [facility]

## 2021-06-17 NOTE — PLAN OF CARE
DATE & TIME: 6/16/21 8321-7537  Cognitive Concerns/ Orientation : A&O x2, disoriented to time & situation. Garbled, slow speech. Flat affect. Calm overnight, stayed in room  BEHAVIOR & AGGRESSION TOOL COLOR: Green. Yellow hx  ABNL VS/O2: Daily VSS on RA  MOBILITY: Independent in room, SBA in hallway.   PAIN MANAGMENT: Denies    DIET: Regular. Good appetite.   BOWEL/BLADDER: Continent, up to BR  ABNL LAB/BG: COVID negative 6/9/21  DRAIN/DEVICES: No IV access  SKIN: Scattered bruising, scab on L shin  D/C DATE: Discharge pending guardianship and potential inpatient psych placement. SW following  OTHER IMPORTANT INFO: Most recent psych consult completed on 6/15. Sitter at bedside, door alarm in place. Commitment and court hold through 7/31/21. Scheduled risperodone BID.

## 2021-06-17 NOTE — PROGRESS NOTES
MD Notification    Notified Person: MD    Notified Person Name: Andrew    Notification Date/Time: 6/17/21; 1503    Notification Interaction: web page    Purpose of Notification: Pt is c/o sore throat; can we have an order for throat lozenges?     Orders Received: awaiting    Comments:

## 2021-06-17 NOTE — PLAN OF CARE
DATE & TIME: 6/17/21 9634-0710  Cognitive Concerns/ Orientation : A&O x2, disoriented to time & situation. Garbled, slow speech. Flat affect.   BEHAVIOR & AGGRESSION TOOL COLOR: Yellow; PRN Respirodol and Ativan given X1 for agitation.   ABNL VS/O2: Daily VSS on RA  MOBILITY: Independent in room, SBA in hallway; went for several walks  PAIN MANAGMENT: Denies    DIET: Regular. Good appetite.   BOWEL/BLADDER: Continent, up to BR  ABNL LAB/BG:  COVID swab for the week completed and sent to lab; Covid negative as of 6/9  DRAIN/DEVICES: No IV access  SKIN: Scattered bruising, scab on L shin  D/C DATE: Discharge pending guardianship and potential inpatient psych placement. SW following  OTHER IMPORTANT INFO: Most recent psych consult completed on 6/15. Long arm and door alarm in place. Commitment and court hold through 7/31/21. COVID swab completed and sent to lab.

## 2021-06-17 NOTE — PLAN OF CARE
DATE & TIME: 6/16/21 1900-2300  Cognitive Concerns/ Orientation : A&Ox2, disoriented to time & situation. Garbled, slow speech. Flat affect. Anxious at times.   BEHAVIOR & AGGRESSION TOOL COLOR: Green. Yellow hx  ABNL VS/O2: Daily VSS on RA  MOBILITY: Independent in room, SBA in hallway.   PAIN MANAGMENT: Denies    DIET: Regular. Good appetite.   BOWEL/BLADDER: Continent, up to BR. Last BM 6/15 per pt rpt.  ABNL LAB/BG: COVID negative 6/9/21, Covid swab 6/17 am shift.   DRAIN/DEVICES: No IV access  SKIN: Scattered bruising, scab on L shin  D/C DATE: Discharge pending guardianship and potential inpatient psych placement. SW following  OTHER IMPORTANT INFO: Most recent psych consult completed on 6/15. Sitter at bedside, door alarm in place. Commitment and court hold through 7/31/21. Scheduled risperodone increased to 3mg BID yesterday. Pt anxious at times this shift. CNA with patient for walks around unit. Med compliant this shift.

## 2021-06-18 LAB
ANION GAP SERPL CALCULATED.3IONS-SCNC: 8 MMOL/L (ref 3–14)
BUN SERPL-MCNC: 15 MG/DL (ref 7–30)
CALCIUM SERPL-MCNC: 8 MG/DL (ref 8.5–10.1)
CHLORIDE SERPL-SCNC: 111 MMOL/L (ref 94–109)
CO2 SERPL-SCNC: 22 MMOL/L (ref 20–32)
CREAT SERPL-MCNC: 0.79 MG/DL (ref 0.66–1.25)
DEPRECATED CALCIDIOL+CALCIFEROL SERPL-MC: 17 UG/L (ref 20–75)
ERYTHROCYTE [DISTWIDTH] IN BLOOD BY AUTOMATED COUNT: 14.6 % (ref 10–15)
GFR SERPL CREATININE-BSD FRML MDRD: >90 ML/MIN/{1.73_M2}
GLUCOSE SERPL-MCNC: 106 MG/DL (ref 70–99)
HCT VFR BLD AUTO: 44.4 % (ref 40–53)
HGB BLD-MCNC: 15.3 G/DL (ref 13.3–17.7)
MCH RBC QN AUTO: 32.3 PG (ref 26.5–33)
MCHC RBC AUTO-ENTMCNC: 34.5 G/DL (ref 31.5–36.5)
MCV RBC AUTO: 94 FL (ref 78–100)
PLATELET # BLD AUTO: 146 10E9/L (ref 150–450)
POTASSIUM SERPL-SCNC: 3.9 MMOL/L (ref 3.4–5.3)
RBC # BLD AUTO: 4.73 10E12/L (ref 4.4–5.9)
SODIUM SERPL-SCNC: 141 MMOL/L (ref 133–144)
WBC # BLD AUTO: 5.7 10E9/L (ref 4–11)

## 2021-06-18 PROCEDURE — 85027 COMPLETE CBC AUTOMATED: CPT | Performed by: INTERNAL MEDICINE

## 2021-06-18 PROCEDURE — 82306 VITAMIN D 25 HYDROXY: CPT | Performed by: INTERNAL MEDICINE

## 2021-06-18 PROCEDURE — 99232 SBSQ HOSP IP/OBS MODERATE 35: CPT | Performed by: INTERNAL MEDICINE

## 2021-06-18 PROCEDURE — 250N000013 HC RX MED GY IP 250 OP 250 PS 637: Performed by: PSYCHIATRY & NEUROLOGY

## 2021-06-18 PROCEDURE — 250N000013 HC RX MED GY IP 250 OP 250 PS 637: Performed by: INTERNAL MEDICINE

## 2021-06-18 PROCEDURE — 99232 SBSQ HOSP IP/OBS MODERATE 35: CPT | Performed by: PSYCHIATRY & NEUROLOGY

## 2021-06-18 PROCEDURE — 120N000001 HC R&B MED SURG/OB

## 2021-06-18 PROCEDURE — 36415 COLL VENOUS BLD VENIPUNCTURE: CPT | Performed by: INTERNAL MEDICINE

## 2021-06-18 PROCEDURE — 80048 BASIC METABOLIC PNL TOTAL CA: CPT | Performed by: INTERNAL MEDICINE

## 2021-06-18 RX ADMIN — RISPERIDONE 3 MG: 1 TABLET, ORALLY DISINTEGRATING ORAL at 21:57

## 2021-06-18 RX ADMIN — DOCUSATE SODIUM 100 MG: 100 CAPSULE, LIQUID FILLED ORAL at 09:00

## 2021-06-18 RX ADMIN — MEMANTINE 5 MG: 5 TABLET ORAL at 09:00

## 2021-06-18 RX ADMIN — HALOPERIDOL 10 MG: 5 TABLET ORAL at 09:00

## 2021-06-18 RX ADMIN — BENZTROPINE MESYLATE 1 MG: 0.5 TABLET ORAL at 21:57

## 2021-06-18 RX ADMIN — LORAZEPAM 1 MG: 0.5 TABLET ORAL at 10:36

## 2021-06-18 RX ADMIN — LEVOTHYROXINE SODIUM 88 MCG: 88 TABLET ORAL at 09:00

## 2021-06-18 RX ADMIN — RISPERIDONE 1 MG: 1 TABLET, ORALLY DISINTEGRATING ORAL at 17:13

## 2021-06-18 RX ADMIN — SALMETEROL XINAFOATE 1 PUFF: 50 POWDER, METERED ORAL; RESPIRATORY (INHALATION) at 21:58

## 2021-06-18 RX ADMIN — DIVALPROEX SODIUM 500 MG: 500 TABLET, FILM COATED, EXTENDED RELEASE ORAL at 21:57

## 2021-06-18 RX ADMIN — Medication 800 UNITS: at 08:59

## 2021-06-18 RX ADMIN — HALOPERIDOL 10 MG: 5 TABLET ORAL at 21:57

## 2021-06-18 RX ADMIN — DOCUSATE SODIUM 100 MG: 100 CAPSULE, LIQUID FILLED ORAL at 21:57

## 2021-06-18 RX ADMIN — HALOPERIDOL 10 MG: 5 TABLET ORAL at 14:30

## 2021-06-18 RX ADMIN — BENZTROPINE MESYLATE 1 MG: 0.5 TABLET ORAL at 09:00

## 2021-06-18 RX ADMIN — ASPIRIN 81 MG: 81 TABLET, COATED ORAL at 09:00

## 2021-06-18 RX ADMIN — ATORVASTATIN CALCIUM 80 MG: 40 TABLET, FILM COATED ORAL at 21:57

## 2021-06-18 RX ADMIN — RISPERIDONE 3 MG: 1 TABLET, ORALLY DISINTEGRATING ORAL at 08:59

## 2021-06-18 RX ADMIN — SALMETEROL XINAFOATE 1 PUFF: 50 POWDER, METERED ORAL; RESPIRATORY (INHALATION) at 08:59

## 2021-06-18 ASSESSMENT — ACTIVITIES OF DAILY LIVING (ADL)
ADLS_ACUITY_SCORE: 13

## 2021-06-18 NOTE — PLAN OF CARE
DATE & TIME: 6/17/21  1900-700  Cognitive Concerns/ Orientation : A&O x2, disoriented to time & situation. Garbled, slow speech. Flat affect.   BEHAVIOR & AGGRESSION TOOL COLOR: Green  ABNL VS/O2: Daily VSS on RA  MOBILITY: Independent in room, SBA in hallway; spent entire shift in room.  PAIN MANAGMENT: Denies   DIET: Regular. Good appetite.   BOWEL/BLADDER: Continent, up to BR  ABNL LAB/BG: COVID negative 6/17/21  DRAIN/DEVICES: No IV access  SKIN: Scattered bruising, scab on L shin  D/C DATE: Discharge pending guardianship and potential inpatient psych placement. SW following  OTHER IMPORTANT INFO: Most recent psych consult completed on 6/15. sitter and door alarm in place. Commitment and court hold through 7/31/21.

## 2021-06-18 NOTE — PROGRESS NOTES
[facility]  Hospitalist Progress Note  Sushil Morales MD  06/18/2021    Assessment & Plan   John Juárez is a 55 year old male with PMHx of schizophrenia, hx of TBI, alcohol use disorder, COPD, hyperlipidemia and hypothyroidism who was admitted on 9/9/2020 for evaluation of aggressive behavior at his group home.   Was evaluated by Psychiatry and his condition stabilized, though his group home was unwilling to take him back and thus hospitalization has been prolonged awaiting new placement and guardianship. Under civil commitment through [facility].  Noted increasingly agitated behavior and attempts at elopement in May 2021.     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  Brought to hospital for evaluation of aggressive behaviors. Group home now unwilling to take him back. Has had numerous CODE 21s called this stay. Seen by psych, meds adjusted. Is currently under civil commitment and court hold through [facility] until 7/31/21.  -Appreciate Psychiatry involvement  -Haldol increased to 10 mg TID on 5/23, because of increasing agitation.  -Benztropine 1mg BID for EPS.  -Valproic acid 500mg at bedtime.  -Door alarm, 1:1 as needed.  -SW following for placement, placement will occur once guardianship is established. The anticipated discharge is to [facility] which is a skilled nursing facility which has a secured male unit for patients with mental health diagnosis.  - 6/8/21 - consulted psychiatry again due to ongoing aggressive behaviors/hallucinations/delusions. See Dr. Murrieta's note from 6/8/21, appreciate his assistance.  - 6/9/21 - psychiatry saw patient again. Increased Risperdal to 3 mg PO BID. Pursuing transfer to inpatient psychiatric unit for ongoing care until guardian is appointed.  - consulted psychiatry for 6/15 and 6/17, consult  noted, possible trial for clozaril added to his Yanez petition.  [facility] he is best served in inpatient psychiatry unit and possible long acting injectable for behavior control and people willing to take guardianship role.     Chronic/Stable/Resolved  Possible Tardive Dyskinesia vs EPSE  Per psych assessment on 4/1/21, noted to have possible tardive dyskinesia vs extrapyramidal side effects of meds.  - At that time, recommended to increase Cogentin to 1mg BID and add vitamin E 800 international unit(s) daily.      Hyperlipidemia  - Chronic and stable on low dose ASA and atorvastatin.     COPD  - Not O2 dependent.  - Chronic and stable on salmeterol, albuterol prn.     Hypothyroidism  - Chronic and stable on levothyroxine.     Tobacco use disorder  - Using nicotine patch and PRN gum.     Hx of infective endocarditis with severe mitral and aortic regurgitation S/P bioprosthetic valve replacement (10/2015)  Systolic heart murmur  HFrEF, not in acute exacerbation  Follows with Dr. Dockery of Heart and Vascular Cardiology, last seen in 1/2020. Note heart murmur on exam, strongest in aortic region. No CP, SOB, dizziness, syncope. Echocardiogram in 5/2016 with EF 50%, no evidence for prosthetic mitral and aortic valve dysfunction.   - Follow-up with outpatient Cardiology upon dismissal from the hospital (on every 2 year follow-ups).  - Continue ASA.     Hx of CVA  - Note basal ganglia stroke in 2015. No focal neuro deficits aside from cognitive dysfunction as above.     Non-severe malnutrition  - Dietitian consulted.     Diet: Room Service  Combination Diet Regular Diet Adult; Safe Tray - with utensils  Snacks/Supplements Adult: Other; Bedtime snack; Between Meals  Room Service     DVT Prophylaxis: ambulate  Valentin Catheter: not present  Code Status: Full Code        Disposition Plan  - when bed is available.    Interval History   -- no acute overnight issues  -- ambulatory and calm    -Data reviewed today: I reviewed all new  labs and imaging over the last 24 hours. I personally reviewed no images or EKG's today.    Physical Exam    , Blood pressure 95/62, pulse 89, temperature 97.2  F (36.2  C), temperature source Oral, resp. rate 16, height 1.829 m (6'), weight 84.2 kg (185 lb 9.6 oz), SpO2 95 %.  Vitals:    12/11/20 0700 03/19/21 2120 03/31/21 1700   Weight: 82.6 kg (182 lb) 84.4 kg (186 lb) 84.2 kg (185 lb 9.6 oz)     Vital Signs with Ranges  Temp:  [97.2  F (36.2  C)-97.4  F (36.3  C)] 97.2  F (36.2  C)  Pulse:  [87-89] 89  Resp:  [16-18] 16  BP: ()/(62-76) 95/62  SpO2:  [95 %-96 %] 95 %  I/O's Last 24 hours  I/O last 3 completed shifts:  In: 500 [P.O.:500]  Out: -     Constitutional: Awake,  no apparent distress  Respiratory: Clear to auscultation bilaterally, no crackles or wheezing  Cardiovascular: Regular rate and rhythm, normal S1 and S2, and no murmur noted  GI: Normal bowel sounds, soft, non-distended, non-tender  Skin/Integumen: No rashes, no cyanosis, no edema  Other:      Medications   All medications were reviewed.      aspirin  81 mg Oral Daily     atorvastatin  80 mg Oral At Bedtime     benztropine  1 mg Oral BID     cholecalciferol  1,250 mcg Oral Q7 Days     divalproex sodium extended-release  500 mg Oral At Bedtime     docusate sodium  100 mg Oral BID     haloperidol  10 mg Oral TID     levothyroxine  88 mcg Oral Daily     memantine  5 mg Oral Daily     risperiDONE  3 mg Sublingual BID     salmeterol  1 puff Inhalation BID     vitamin E  800 Units Oral Daily        Data   Recent Labs   Lab 06/18/21  0735   WBC 5.7   HGB 15.3   MCV 94   *      POTASSIUM 3.9   CHLORIDE 111*   CO2 22   BUN 15   CR 0.79   ANIONGAP 8   LESLEE 8.0*   *       No results found for this or any previous visit (from the past 24 hour(s)).    Sushil Morales MD  Text Page  (7am to 6pm)

## 2021-06-18 NOTE — PROGRESS NOTES
Care Management Follow Up    Length of Stay (days): 272    Expected Discharge Date: 08/17/21     Concerns to be Addressed: patient is non decisional and needs a guardian in order to discharge to a care facility.   Patient plan of care discussed at interdisciplinary rounds: Yes    Anticipated Discharge Disposition: (residential care home or nursing home)     Anticipated Discharge Services: None  Anticipated Discharge DME: None    Patient/family educated on Medicare website which has current facility and service quality ratings: (not applicable)  Education Provided on the Discharge Plan:    Patient/Family in Agreement with the Plan: no    Referrals Placed by CM/SW: Post Acute Facilities  Private pay costs discussed:     Additional Information:  Contacted [facility] and their psychiatry units remain at capacity.  Will call again on Saturday.    In relation to guardian process;    Patient's behavioral , Kurtis Chapin, did speak again with patient's sister Freida Roberts this week regarding our inability to locate a professional guardian for patient.  She declines taking on this role, expressing fear for her safety.  She is concerned how John would respond to her if she needed to make a decision which he opposed.  She shared she talks with John by phone once a week and observes he doesn't retain their conversation from week to week.      has placed a call to the [facility] office [phone] to ask if they have any suggestions to help in locating a guardian for patient.    Dr Murrieta asked about placement at a [facility] facility such as [place] or [facility].  Previously  and his CM have been told  John doesn't meet their priority list.    The state facility priority list is for patient's who are IV drug users in need of commitment.  Writer will ask to speak again with the CARE office, which places patients into [facility] facilities.        Lulu Contreras [facility]

## 2021-06-18 NOTE — PLAN OF CARE
"DATE & TIME: 6/18/2021 8289-6950  Cognitive Concerns/ Orientation : A&O x2, disoriented to time & situation. Garbled, slow speech. Flat affect.   BEHAVIOR & AGGRESSION TOOL COLOR: Yellow. Pt c/o \"voice in head\", punching in air and into open hand. PRN Ativan 1 mg given. Pt was found to spit out his meds after writer left--MAKE SURE HE SWALLOWS HIS MEDS.   ABNL VS/O2: Daily VSS on RA  MOBILITY: Independent in room, SBA in hallway; went for several walks, showered.  PAIN MANAGMENT: denied pain.   DIET: Regular. Good appetite.   BOWEL/BLADDER: Continent, up to BR  ABNL LAB/BG: COVID negative 6/17/21  DRAIN/DEVICES: No IV access  SKIN: Scattered bruising, scab on L shin  D/C DATE: Discharge pending guardianship and potential inpatient psych placement. SW following  OTHER IMPORTANT INFO: Most recent psych consult completed on 6/15. sitter and door alarm in place. Commitment and court hold through 7/31/21. Seen by psychiatrist again today-May consider Clozaril, if current med regiment is not effective.     1651: writer was told by bedside attendant that pt was c/o chest pain. When assessed, pt stated that he had been having left mid to upper chest pain for the last several days. Pt said the pain was better now, could not recall any precipitating factor. Denied pain when chest was pressed. Pt then sat up from bed and sat in chair and said that \"my pain is better when I do this\"--drinking gesture and laughed. When asked again, pt denied having chest pain. Will monitor.     "

## 2021-06-18 NOTE — CONSULTS
Patient seen this morning, full note to follow      [facility] Psychiatric Consult Progress Note    Interval History:   Pt seen, chart reviewed, case discussed with nursing staff. Nursing reporting slight improvement in recent days. He has still had periods of frustration about being in the hospital and being unable to leave. He has been more redirectable but still required PRN Ativan at noon yesterday after getting PRN Risperidone at 10:00 AM. No overt severe agitation reported. On interview he again is placid in the morning for the most part save for the end when he wanted to go home and became frustrated with me. Prior to that he denied feeling depressed or anxious. Denied feeling paranoid or hallucinatory experience. Reported he has been sleeping, good appetite, good energy. Denies pain. Denies problems with bowel or bladder. He reports taking his medications and not being bothered by the medications. Denies side effects. Denies suicidal or homicidal ideation.      Review of systems:   10 point Review of Systems completed by Dr. Murrieta , and is  is negative other than noted in the HPI     Medications:       aspirin  81 mg Oral Daily     atorvastatin  80 mg Oral At Bedtime     benztropine  1 mg Oral BID     cholecalciferol  1,250 mcg Oral Q7 Days     divalproex sodium extended-release  500 mg Oral At Bedtime     docusate sodium  100 mg Oral BID     haloperidol  10 mg Oral TID     levothyroxine  88 mcg Oral Daily     memantine  5 mg Oral Daily     risperiDONE  3 mg Sublingual BID     salmeterol  1 puff Inhalation BID     vitamin E  800 Units Oral Daily     acetaminophen, albuterol, alum & mag hydroxide-simethicone, sore throat lozenge, haloperidol lactate, LORazepam, melatonin, naloxone **OR** naloxone **OR** naloxone **OR** naloxone, nicotine, nicotine, ondansetron **OR** ondansetron, polyethylene glycol, polyethylene glycol-propylene glycol, risperiDONE    Mental Status Examination:  "    Appearance: He is lying down resting in the morning, awake, eyes open.  Eye Contact: fair  speech:  Impoverished, dysarthric, monotone, speaks softly, slowly. slightly louder when he gets frustrated  language: Poor  Psychomotor Behavior:  some buccal-lingual dyskinesia, chewing movements  Mood: \"fine\"  affect: Blunted   thought Process:  paucity of speech, no obvious loosening of associations flight of ideas or formal thought disorder  Thought Content:  no evidence of suicidal ideation or homicidal ideation, but he appears paranoid, making delusional statements frequently when more awake and tlaking    Oriented to: x2    Recent and Remote Memory:  limited, states he does not know where he is, says year is 2029. States Ina is president.   Fund of Knowledge: delayed  Insight:  limited  Judgment:  limited         Labs/Vitals:     Recent Results (from the past 24 hour(s))   Asymptomatic SARS-CoV-2 COVID-19 Virus (Coronavirus) by PCR    Collection Time: 06/17/21  2:43 PM    Specimen: Nasopharyngeal   Result Value Ref Range    SARS-CoV-2 Virus Specimen Source Nasopharyngeal     SARS-CoV-2 PCR Result NEGATIVE     SARS-CoV-2 PCR Comment (Note)    Basic metabolic panel    Collection Time: 06/18/21  7:35 AM   Result Value Ref Range    Sodium 141 133 - 144 mmol/L    Potassium 3.9 3.4 - 5.3 mmol/L    Chloride 111 (H) 94 - 109 mmol/L    Carbon Dioxide 22 20 - 32 mmol/L    Anion Gap 8 3 - 14 mmol/L    Glucose 106 (H) 70 - 99 mg/dL    Urea Nitrogen 15 7 - 30 mg/dL    Creatinine 0.79 0.66 - 1.25 mg/dL    GFR Estimate >90 >60 mL/min/[1.73_m2]    GFR Estimate If Black >90 >60 mL/min/[1.73_m2]    Calcium 8.0 (L) 8.5 - 10.1 mg/dL   CBC with platelets    Collection Time: 06/18/21  7:35 AM   Result Value Ref Range    WBC 5.7 4.0 - 11.0 10e9/L    RBC Count 4.73 4.4 - 5.9 10e12/L    Hemoglobin 15.3 13.3 - 17.7 g/dL    Hematocrit 44.4 40.0 - 53.0 %    MCV 94 78 - 100 fl    MCH 32.3 26.5 - 33.0 pg    MCHC 34.5 31.5 - 36.5 g/dL    RDW " 14.6 10.0 - 15.0 %    Platelet Count 146 (L) 150 - 450 10e9/L     B/P: 105/74, T: 97.4, P: 72, R: 18  Impression  John is a 55-year-old gentleman with a known major neurocognitive disorder and schizophrenic spectrum illness.  He continues to show significant impairment, poor insight. Guardianship is a problem as family is reportedly not comfortable assuming this role and there are barriers from the county about this from their end. This has made placement a problem.     1.  Schizophrenia, unspecified  2.  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  Alcohol use disorder in remission.   4.  Stimulant use disorder, in remission.   5.  Chronic obstructive pulmonary disease.   6.  Hypertension.   7. R/O mild TD vs EPSE    1.  Risperdal increased to 3g twice daily on 6/15/21  -- Ativan to 0.5-1 mg, with 1 mg reserved for severe agitation and 0.5 mg for perhaps elevating levels of anxiety/agitation prior to it becoming severe. 1 mg is effective but causes some ataxia so perhaps a 0.5 mg when he is not severely agitated will be better tolerated.   2.  Guardianship placement remains a huge issue. Apparently family is intimated by him so do not want to take guardianship. This is very concerning situation with how long he has been here and given outside psychiatry units have not accepted patient as of yet. Patient may ultimately require a clozapine trial, and/or placement on long acting injectable such as Invega Sustenna which perhaps would increase likely ko someone would be willing to take him as his guardian.This would be best managed in inpatient psychiatry so he can have more autonomy for movement and social interaction.   3.  We are awaiting determination if Clozaril added to Yanez    4.  Continue commitment, Y[place]z, pursuit of guardianship --see above about difficulties and problem solving.      Attestation:  Patient has been seen and evaluated by me,  Lio Murrieta MD

## 2021-06-19 PROCEDURE — 120N000001 HC R&B MED SURG/OB

## 2021-06-19 PROCEDURE — 250N000013 HC RX MED GY IP 250 OP 250 PS 637: Performed by: INTERNAL MEDICINE

## 2021-06-19 PROCEDURE — 250N000013 HC RX MED GY IP 250 OP 250 PS 637: Performed by: PSYCHIATRY & NEUROLOGY

## 2021-06-19 PROCEDURE — 250N000013 HC RX MED GY IP 250 OP 250 PS 637: Performed by: HOSPITALIST

## 2021-06-19 PROCEDURE — 99231 SBSQ HOSP IP/OBS SF/LOW 25: CPT | Performed by: HOSPITALIST

## 2021-06-19 RX ADMIN — MEMANTINE 5 MG: 5 TABLET ORAL at 08:49

## 2021-06-19 RX ADMIN — DOCUSATE SODIUM 100 MG: 100 CAPSULE, LIQUID FILLED ORAL at 08:49

## 2021-06-19 RX ADMIN — HALOPERIDOL 10 MG: 5 TABLET ORAL at 08:49

## 2021-06-19 RX ADMIN — LORAZEPAM 1 MG: 0.5 TABLET ORAL at 12:14

## 2021-06-19 RX ADMIN — BENZTROPINE MESYLATE 1 MG: 0.5 TABLET ORAL at 08:49

## 2021-06-19 RX ADMIN — ASPIRIN 81 MG: 81 TABLET, COATED ORAL at 08:49

## 2021-06-19 RX ADMIN — BENZTROPINE MESYLATE 1 MG: 0.5 TABLET ORAL at 21:23

## 2021-06-19 RX ADMIN — RISPERIDONE 1 MG: 1 TABLET, ORALLY DISINTEGRATING ORAL at 16:21

## 2021-06-19 RX ADMIN — MELATONIN TAB 3 MG 3 MG: 3 TAB at 21:24

## 2021-06-19 RX ADMIN — RISPERIDONE 3 MG: 1 TABLET, ORALLY DISINTEGRATING ORAL at 21:24

## 2021-06-19 RX ADMIN — SALMETEROL XINAFOATE 1 PUFF: 50 POWDER, METERED ORAL; RESPIRATORY (INHALATION) at 21:24

## 2021-06-19 RX ADMIN — DOCUSATE SODIUM 100 MG: 100 CAPSULE, LIQUID FILLED ORAL at 21:24

## 2021-06-19 RX ADMIN — SALMETEROL XINAFOATE 1 PUFF: 50 POWDER, METERED ORAL; RESPIRATORY (INHALATION) at 08:49

## 2021-06-19 RX ADMIN — HALOPERIDOL 10 MG: 5 TABLET ORAL at 14:51

## 2021-06-19 RX ADMIN — HALOPERIDOL 10 MG: 5 TABLET ORAL at 21:24

## 2021-06-19 RX ADMIN — LORAZEPAM 1 MG: 0.5 TABLET ORAL at 16:54

## 2021-06-19 RX ADMIN — RISPERIDONE 3 MG: 1 TABLET, ORALLY DISINTEGRATING ORAL at 08:49

## 2021-06-19 RX ADMIN — Medication 800 UNITS: at 08:49

## 2021-06-19 RX ADMIN — ATORVASTATIN CALCIUM 80 MG: 40 TABLET, FILM COATED ORAL at 21:23

## 2021-06-19 RX ADMIN — LEVOTHYROXINE SODIUM 88 MCG: 88 TABLET ORAL at 08:49

## 2021-06-19 RX ADMIN — DIVALPROEX SODIUM 500 MG: 500 TABLET, FILM COATED, EXTENDED RELEASE ORAL at 21:24

## 2021-06-19 ASSESSMENT — ACTIVITIES OF DAILY LIVING (ADL)
ADLS_ACUITY_SCORE: 13

## 2021-06-19 NOTE — PROGRESS NOTES
[facility]    Hospitalist Progress Note      Assessment & Plan   John Juárez is a 55 year old male with PMHx of schizophrenia, hx of TBI, alcohol use disorder, COPD, hyperlipidemia and hypothyroidism who was admitted on 9/9/2020 for evaluation of aggressive behavior at his group home.   Was evaluated by Psychiatry and his condition stabilized, though his group home was unwilling to take him back and thus hospitalization has been prolonged awaiting new placement and guardianship. Under civil commitment through [facility].  Noted increasingly agitated behavior and attempts at elopement in May 2021.    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  Brought to hospital for evaluation of aggressive behaviors. Group home now unwilling to take him back. Has had numerous CODE 21s called this stay. Seen by psych, meds adjusted. Is currently under civil commitment and court hold through [facility] until 7/31/21.  -Appreciate Psychiatry involvement  -Haldol increased to 10 mg TID on 5/23, because of increasing agitation.  -Benztropine 1mg BID for EPS.  -Valproic acid 500mg at bedtime.  -Door alarm, 1:1 as needed.  -SW following for placement, placement will occur once guardianship is established. The anticipated discharge is to [facility] which is a skilled nursing facility which has a secured male unit for patients with mental health diagnosis.  - 6/8/21 - consulted psychiatry again due to ongoing aggressive behaviors/hallucinations/delusions. See Dr. Murrieta's note from 6/8/21, appreciate his assistance.  - 6/9/21 - psychiatry saw patient again. Increased Risperdal to 3 mg PO BID. Pursuing transfer to inpatient psychiatric unit for ongoing care until guardian is appointed.  - consulted psychiatry for 6/15 and 6/17, consult noted, possible trial for clozaril  added to his Yanez petition.  [place] he is best served in inpatient psychiatry unit and possible long acting injectable for behavior control and people willing to take guardianship role.    Vitamin D Deficiency  Vit D level 16, 17 in May and June. Has been in hospital since Sept 2020.  - started on cholecalciferol 74759 units on 6/17     Chronic/Stable/Resolved  Possible Tardive Dyskinesia vs EPSE  Per psych assessment on 4/1/21, noted to have possible tardive dyskinesia vs extrapyramidal side effects of meds.  - At that time, recommended to increase Cogentin to 1mg BID and add vitamin E 800 international unit(s) daily.      Hyperlipidemia  - Chronic and stable on low dose ASA and atorvastatin.     COPD  - Not O2 dependent.  - Chronic and stable on salmeterol, albuterol prn.     Hypothyroidism  - Chronic and stable on levothyroxine.     Tobacco use disorder  - Using nicotine patch and PRN gum.     Hx of infective endocarditis with severe mitral and aortic regurgitation S/P bioprosthetic valve replacement (10/2015)  Systolic heart murmur  HFrEF, not in acute exacerbation  Follows with Dr. Dockery of Heart and Vascular Cardiology, last seen in 1/2020. Note heart murmur on exam, strongest in aortic region. No CP, SOB, dizziness, syncope. Echocardiogram in 5/2016 with EF 50%, no evidence for prosthetic mitral and aortic valve dysfunction.   - Follow-up with outpatient Cardiology upon dismissal from the hospital (on every 2 year follow-ups).  - Continue ASA.     Hx of CVA  - Note basal ganglia stroke in 2015. No focal neuro deficits aside from cognitive dysfunction as above.      Non-severe malnutrition  - Dietitian consulted.    DVT Prophylaxis: Ambulate  Code Status: Full Code  Expected discharge: once placement found    Shamika Harpal, DO  Text Page (7am - 6pm)    Interval History   Patient seen and examined. No issues overnight. Sitter at bedside this morning. He wants to leave, states his commitment is over. Discussed  that his sister is unwilling for him to stay with her, but he states he communicates with her via a bug in his head and that she says she can stay with him. He otherwise is calm, laying in bed.    -Data reviewed today: I reviewed all new labs and imaging results over the last 24 hours. I personally reviewed no images or EKG's today.    Physical Exam   Temp: 97.4  F (36.3  C) Temp src: Oral BP: 120/75 Pulse: 79   Resp: 18 SpO2: 98 % O2 Device: None (Room air)    Vitals:    12/11/20 0700 03/19/21 2120 03/31/21 1700   Weight: 82.6 kg (182 lb) 84.4 kg (186 lb) 84.2 kg (185 lb 9.6 oz)     Vital Signs with Ranges  Temp:  [97.4  F (36.3  C)] 97.4  F (36.3  C)  Pulse:  [79] 79  Resp:  [18] 18  BP: (120)/(75) 120/75  SpO2:  [98 %] 98 %  I/O last 3 completed shifts:  In: 500 [P.O.:500]  Out: -     Constitutional: Awake, alert, cooperative, no apparent distress  Respiratory: Clear to auscultation bilaterally, no crackles or wheezing  Cardiovascular: Regular rate and rhythm, normal S1 and S2, and no murmur noted  GI: Normal bowel sounds, soft, non-distended, non-tender  Skin/Integumen: No rashes, no cyanosis, no edema  Other:     Medications       aspirin  81 mg Oral Daily     atorvastatin  80 mg Oral At Bedtime     benztropine  1 mg Oral BID     cholecalciferol  1,250 mcg Oral Q7 Days     divalproex sodium extended-release  500 mg Oral At Bedtime     docusate sodium  100 mg Oral BID     haloperidol  10 mg Oral TID     levothyroxine  88 mcg Oral Daily     memantine  5 mg Oral Daily     risperiDONE  3 mg Sublingual BID     salmeterol  1 puff Inhalation BID     vitamin E  800 Units Oral Daily       Data   Recent Labs   Lab 06/18/21  0735   WBC 5.7   HGB 15.3   MCV 94   *      POTASSIUM 3.9   CHLORIDE 111*   CO2 22   BUN 15   CR 0.79   ANIONGAP 8   LESLEE 8.0*   *       No results found for this or any previous visit (from the past 24 hour(s)).

## 2021-06-19 NOTE — PLAN OF CARE
"DATE & TIME: 6/18/2021 1699-3682  Cognitive Concerns/ Orientation : A&O x2, disoriented to time & situation. whispers. Flat affect.   BEHAVIOR & AGGRESSION TOOL COLOR: Yellow. Pt c/o \"voices in head\" otherwise no physical outburst overnight.  ABNL VS/O2: Daily VSS on RA  MOBILITY: Independent in room, SBA in hallway; went for several walks, showered.  PAIN MANAGMENT: denied pain.   DIET: Regular. Good appetite.   BOWEL/BLADDER: Continent, up to BR  ABNL LAB/BG: COVID negative 6/17/21  DRAIN/DEVICES: No IV access  SKIN: Scattered bruising, scab on L shin  D/C DATE: Discharge pending guardianship and potential inpatient psych placement. SW following  OTHER IMPORTANT INFO: Most recent psych consult completed on 6/15. sitter and door alarm in place. Commitment and court hold through 7/31/21. Seen by psychiatrist again today-May consider Clozaril, if current med regiment is not effective.     "

## 2021-06-19 NOTE — PLAN OF CARE
"DATE & TIME: 6/19/2021 [id number]  Cognitive Concerns/ Orientation : Alert to self, whispers. Flat affect.   BEHAVIOR & AGGRESSION TOOL COLOR: Yellow. Pt c/o \"voices in head\" pacing in room, verbally abusive to sitter and writer at times when told he was not allowed to leave yet. PRN Ativan 1 mg given once.   ABNL VS/O2: Daily VSS on RA  MOBILITY: Independent in room, SBA in hallway; went for several walks. Noted to be QUEEN after walks.   PAIN MANAGMENT: denied pain (and no c/o chest pain today).   DIET: Regular. Good appetite.   BOWEL/BLADDER: Continent, up to BR  ABNL LAB/BG: COVID negative 6/17/21  DRAIN/DEVICES: No IV access  SKIN: Scattered bruising, scab on L shin  D/C DATE: Discharge pending guardianship and potential inpatient psych placement. SW following  OTHER IMPORTANT INFO: Sitter and door alarm in place. Commitment and court hold through 7/31/21. Seen by psychiatrist again on 6/18/2021-May consider Clozaril, if current med regiment is not effective.     1610: agitated, wanting to leave tonight, pacing. Gave Risperidone.   1700: pt came out of room, spoke with [facility], wanting to leave again, found pushing things around [facility] area with back of hand angrily, verbally abusive. PRN Ativan given.       "

## 2021-06-20 PROCEDURE — 120N000001 HC R&B MED SURG/OB

## 2021-06-20 PROCEDURE — 250N000013 HC RX MED GY IP 250 OP 250 PS 637: Performed by: INTERNAL MEDICINE

## 2021-06-20 PROCEDURE — 250N000013 HC RX MED GY IP 250 OP 250 PS 637: Performed by: PSYCHIATRY & NEUROLOGY

## 2021-06-20 PROCEDURE — 99231 SBSQ HOSP IP/OBS SF/LOW 25: CPT | Performed by: HOSPITALIST

## 2021-06-20 PROCEDURE — 250N000013 HC RX MED GY IP 250 OP 250 PS 637: Performed by: HOSPITALIST

## 2021-06-20 RX ADMIN — DOCUSATE SODIUM 100 MG: 100 CAPSULE, LIQUID FILLED ORAL at 10:45

## 2021-06-20 RX ADMIN — ASPIRIN 81 MG: 81 TABLET, COATED ORAL at 10:44

## 2021-06-20 RX ADMIN — MEMANTINE 5 MG: 5 TABLET ORAL at 10:45

## 2021-06-20 RX ADMIN — SALMETEROL XINAFOATE 1 PUFF: 50 POWDER, METERED ORAL; RESPIRATORY (INHALATION) at 10:44

## 2021-06-20 RX ADMIN — BENZTROPINE MESYLATE 1 MG: 0.5 TABLET ORAL at 10:44

## 2021-06-20 RX ADMIN — HALOPERIDOL 10 MG: 5 TABLET ORAL at 13:35

## 2021-06-20 RX ADMIN — Medication 800 UNITS: at 10:45

## 2021-06-20 RX ADMIN — RISPERIDONE 3 MG: 1 TABLET, ORALLY DISINTEGRATING ORAL at 10:45

## 2021-06-20 RX ADMIN — HALOPERIDOL 10 MG: 5 TABLET ORAL at 10:44

## 2021-06-20 RX ADMIN — LEVOTHYROXINE SODIUM 88 MCG: 88 TABLET ORAL at 10:45

## 2021-06-20 RX ADMIN — LORAZEPAM 1 MG: 0.5 TABLET ORAL at 18:28

## 2021-06-20 ASSESSMENT — ACTIVITIES OF DAILY LIVING (ADL)
ADLS_ACUITY_SCORE: 13

## 2021-06-20 NOTE — PLAN OF CARE
"DATE & TIME: 6/19/2021 6373-0385  Cognitive Concerns/ Orientation : Alert to self, whispers. Flat affect.   BEHAVIOR & AGGRESSION TOOL COLOR: Yellow. Pt c/o \"voices in head\" pacing in room, verbally abusive to sitter and writer at times when told he was not allowed to leave yet. No prn given overnight.   ABNL VS/O2: Daily VSS on RA  MOBILITY: Independent in room, SBA in hallway; went for several walks. Noted to be QUEEN after walks.   PAIN MANAGMENT: denied pain (and no c/o chest pain today).   DIET: Regular. Good appetite.   BOWEL/BLADDER: Continent, up to BR  ABNL LAB/BG: COVID negative 6/17/21  DRAIN/DEVICES: No IV access  SKIN: Scattered bruising, scab on L shin  D/C DATE: Discharge pending guardianship and potential inpatient psych placement. SW following  OTHER IMPORTANT INFO: Sitter and door alarm in place. Commitment and court hold through 7/31/21. Seen by psychiatrist again on 6/18/2021-May consider Clozaril, if current med regiment is not effective.     "

## 2021-06-20 NOTE — PROGRESS NOTES
[facility]    Hospitalist Progress Note      Assessment & Plan   John Juárez is a 55 year old male with PMHx of schizophrenia, hx of TBI, alcohol use disorder, COPD, hyperlipidemia and hypothyroidism who was admitted on 9/9/2020 for evaluation of aggressive behavior at his group home.   Was evaluated by Psychiatry and his condition stabilized, though his group home was unwilling to take him back and thus hospitalization has been prolonged awaiting new placement and guardianship. Under civil commitment through [facility].  Noted increasingly agitated behavior and attempts at elopement in May 2021.    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  Brought to hospital for evaluation of aggressive behaviors. Group home now unwilling to take him back. Has had numerous CODE 21s called this stay. Seen by psych, meds adjusted. Is currently under civil commitment and court hold through [facility] until 7/31/21.  -Appreciate Psychiatry involvement  -Haldol increased to 10 mg TID on 5/23, because of increasing agitation.  -Benztropine 1mg BID for EPS.  -Valproic acid 500mg at bedtime.  -Door alarm, 1:1 as needed.  -SW following for placement, placement will occur once guardianship is established. The anticipated discharge is to [facility] which is a skilled nursing facility which has a secured male unit for patients with mental health diagnosis.  - 6/8/21 - consulted psychiatry again due to ongoing aggressive behaviors/hallucinations/delusions. See Dr. Murrieta's note from 6/8/21, appreciate his assistance.  - 6/9/21 - psychiatry saw patient again. Increased Risperdal to 3 mg PO BID. Pursuing transfer to inpatient psychiatric unit for ongoing care until guardian is appointed.  - consulted psychiatry for 6/15 and 6/17, consult noted, possible trial for clozaril  added to his Yanez petition.  [place] he is best served in inpatient psychiatry unit and possible long acting injectable for behavior control and people willing to take guardianship role.  - reconsult psych for 6/21    Vitamin D Deficiency  Vit D level 16, 17 in May and June. Has been in hospital since Sept 2020.  - started on cholecalciferol 40939 units on 6/17     Chronic/Stable/Resolved  Possible Tardive Dyskinesia vs EPSE  Per psych assessment on 4/1/21, noted to have possible tardive dyskinesia vs extrapyramidal side effects of meds.  - At that time, recommended to increase Cogentin to 1mg BID and add vitamin E 800 international unit(s) daily.      Hyperlipidemia  - Chronic and stable on low dose ASA and atorvastatin.     COPD  - Not O2 dependent.  - Chronic and stable on salmeterol, albuterol prn.     Hypothyroidism  - Chronic and stable on levothyroxine.     Tobacco use disorder  - Using nicotine patch and PRN gum.     Hx of infective endocarditis with severe mitral and aortic regurgitation S/P bioprosthetic valve replacement (10/2015)  Systolic heart murmur  HFrEF, not in acute exacerbation  Follows with Dr. Dockery of Heart and Vascular Cardiology, last seen in 1/2020. Note heart murmur on exam, strongest in aortic region. No CP, SOB, dizziness, syncope. Echocardiogram in 5/2016 with EF 50%, no evidence for prosthetic mitral and aortic valve dysfunction.   - Follow-up with outpatient Cardiology upon dismissal from the hospital (on every 2 year follow-ups).  - Continue ASA.     Hx of CVA  - Note basal ganglia stroke in 2015. No focal neuro deficits aside from cognitive dysfunction as above.      Non-severe malnutrition  - Dietitian consulted.    DVT Prophylaxis: Ambulate  Code Status: Full Code  Expected discharge: once placement found    Shamika Rowan, DO  Text Page (7am - 6pm)    Interval History   Patient seen and examined. Agitation in evening, pacin--wanting to leave, verbally abuse to staff. Given PRN  ativan and risperidone Overnight went on several walks. I saw him after lunch and he again stated he wanted to leave. He was asking when he could leave again--told him not any-time soon and he became upset, walked to the [facility] and asked to call the police. Last few days he has needed 1-2 PRN ativan per day and 1 PRN dose of risperidone.    -Data reviewed today: I reviewed all new labs and imaging results over the last 24 hours. I personally reviewed no images or EKG's today.    Physical Exam   Temp: 97.8  F (36.6  C) Temp src: Oral BP: 99/63 Pulse: 63   Resp: 16 SpO2: 97 % O2 Device: None (Room air)    Vitals:    12/11/20 0700 03/19/21 2120 03/31/21 1700   Weight: 82.6 kg (182 lb) 84.4 kg (186 lb) 84.2 kg (185 lb 9.6 oz)     Vital Signs with Ranges  Temp:  [97.8  F (36.6  C)] 97.8  F (36.6  C)  Pulse:  [63] 63  Resp:  [16] 16  BP: (99)/(63) 99/63  SpO2:  [97 %] 97 %  No intake/output data recorded.    Constitutional: Awake, alert, mild agitation  Respiratory: no increased work of breathing, no audible wheeze  Cardiovascular: good perfusion  GI: did not examine  Skin/Integumen: No rashes, no cyanosis, no edema  Other: Gait steady    Medications       aspirin  81 mg Oral Daily     atorvastatin  80 mg Oral At Bedtime     benztropine  1 mg Oral BID     cholecalciferol  1,250 mcg Oral Q7 Days     divalproex sodium extended-release  500 mg Oral At Bedtime     docusate sodium  100 mg Oral BID     haloperidol  10 mg Oral TID     levothyroxine  88 mcg Oral Daily     memantine  5 mg Oral Daily     risperiDONE  3 mg Sublingual BID     salmeterol  1 puff Inhalation BID     vitamin E  800 Units Oral Daily       Data   Recent Labs   Lab 06/18/21  0735   WBC 5.7   HGB 15.3   MCV 94   *      POTASSIUM 3.9   CHLORIDE 111*   CO2 22   BUN 15   CR 0.79   ANIONGAP 8   LESLEE 8.0*   *       No results found for this or any previous visit (from the past 24 hour(s)).

## 2021-06-20 NOTE — PLAN OF CARE
DATE & TIME: 6/20/2021 4767-4847  Cognitive Concerns/ Orientation : Alert to self, whispers. Flat affect.   BEHAVIOR & AGGRESSION TOOL COLOR: green to Yellow. Slept until 1030 am. Cooperative mostly. Punching into open fist several times. Redirectable. Went for walks. Sitter at bedside.   ABNL VS/O2: Daily VSS on RA  MOBILITY: Independent in room, SBA in hallway; Noted to be QUEEN after walks.   PAIN MANAGMENT: denied pain (and no c/o chest pain today).   DIET: Regular. Good appetite.   BOWEL/BLADDER: Continent, up to BR  ABNL LAB/BG: COVID negative 6/17/21  DRAIN/DEVICES: No IV access  SKIN: Scattered bruising, scab on L shin  D/C DATE: Discharge pending guardianship and potential inpatient psych placement. SW following  OTHER IMPORTANT INFO: Sitter and door alarm in place. Commitment and court hold through 7/31/21. Re consulted psych today,  to be seen in am.     1830: voice in head, talking to brother who is not in room, calling 911. PRN ativan 1mg oral given.

## 2021-06-21 PROCEDURE — 250N000013 HC RX MED GY IP 250 OP 250 PS 637: Performed by: PSYCHIATRY & NEUROLOGY

## 2021-06-21 PROCEDURE — 99231 SBSQ HOSP IP/OBS SF/LOW 25: CPT | Performed by: HOSPITALIST

## 2021-06-21 PROCEDURE — 250N000013 HC RX MED GY IP 250 OP 250 PS 637: Performed by: INTERNAL MEDICINE

## 2021-06-21 PROCEDURE — 99207 PR NO CHARGE LOS: CPT | Performed by: NURSE PRACTITIONER

## 2021-06-21 PROCEDURE — 120N000001 HC R&B MED SURG/OB

## 2021-06-21 PROCEDURE — 99233 SBSQ HOSP IP/OBS HIGH 50: CPT | Performed by: PSYCHIATRY & NEUROLOGY

## 2021-06-21 RX ADMIN — LORAZEPAM 1 MG: 0.5 TABLET ORAL at 10:21

## 2021-06-21 RX ADMIN — ASPIRIN 81 MG: 81 TABLET, COATED ORAL at 09:10

## 2021-06-21 RX ADMIN — DOCUSATE SODIUM 100 MG: 100 CAPSULE, LIQUID FILLED ORAL at 09:10

## 2021-06-21 RX ADMIN — HALOPERIDOL 10 MG: 5 TABLET ORAL at 09:09

## 2021-06-21 RX ADMIN — RISPERIDONE 3 MG: 1 TABLET, ORALLY DISINTEGRATING ORAL at 20:05

## 2021-06-21 RX ADMIN — LORAZEPAM 1 MG: 0.5 TABLET ORAL at 16:29

## 2021-06-21 RX ADMIN — HALOPERIDOL 10 MG: 5 TABLET ORAL at 13:28

## 2021-06-21 RX ADMIN — HALOPERIDOL 10 MG: 5 TABLET ORAL at 20:05

## 2021-06-21 RX ADMIN — BENZTROPINE MESYLATE 1 MG: 0.5 TABLET ORAL at 09:10

## 2021-06-21 RX ADMIN — RISPERIDONE 1 MG: 1 TABLET, ORALLY DISINTEGRATING ORAL at 11:50

## 2021-06-21 RX ADMIN — Medication 800 UNITS: at 09:10

## 2021-06-21 RX ADMIN — BENZTROPINE MESYLATE 1 MG: 0.5 TABLET ORAL at 20:05

## 2021-06-21 RX ADMIN — RISPERIDONE 3 MG: 1 TABLET, ORALLY DISINTEGRATING ORAL at 09:09

## 2021-06-21 RX ADMIN — MEMANTINE 5 MG: 5 TABLET ORAL at 09:09

## 2021-06-21 RX ADMIN — SALMETEROL XINAFOATE 1 PUFF: 50 POWDER, METERED ORAL; RESPIRATORY (INHALATION) at 09:10

## 2021-06-21 RX ADMIN — LEVOTHYROXINE SODIUM 88 MCG: 88 TABLET ORAL at 09:09

## 2021-06-21 RX ADMIN — ATORVASTATIN CALCIUM 80 MG: 40 TABLET, FILM COATED ORAL at 20:06

## 2021-06-21 ASSESSMENT — ACTIVITIES OF DAILY LIVING (ADL)
ADLS_ACUITY_SCORE: 13
ADLS_ACUITY_SCORE: 14
ADLS_ACUITY_SCORE: 13

## 2021-06-21 NOTE — PLAN OF CARE
DATE & TIME: 6/20/2021 [id number]  Cognitive Concerns/ Orientation : Alert to self, whispers. Flat affect.   BEHAVIOR & AGGRESSION TOOL COLOR: green to Yellow. Drowsy on nights, too drowsy to take bedtime scheduled meds.  ABNL VS/O2: Daily VSS on RA  MOBILITY: Independent in room, SBA in hallway; Noted to be QUEEN after walks.   PAIN MANAGMENT: denied pain (and no c/o chest pain today).   DIET: Regular. Good appetite.   BOWEL/BLADDER: Continent, up to BR  ABNL LAB/BG: COVID negative 6/17/21  DRAIN/DEVICES: No IV access  SKIN: Scattered bruising, scab on L shin  D/C DATE: Discharge pending guardianship and potential inpatient psych placement. SW following  OTHER IMPORTANT INFO: Sitter and door alarm in place. Commitment and court hold through 7/31/21. Re consulted psych to be seen in am.

## 2021-06-21 NOTE — CODE/RAPID RESPONSE
[facility]    RRT Note:  CODE 21  6/21/2021   Time Called: 11: 37 PM    RRT called for: Code 21    Behavior disturbance secondary to underlying mental health and neurocognitive disorders  Code 21 called for Mr. Juárez attempting to elope. He made it to Door 6 before being stopped by security. By report Mr. Juárez has been very somnolent in the evening, medications held, then he has escalating issues during the daytime hours. This is the third time [employer] has been to the floor today.     INTERVENTIONS:  - with increasing behavior disturbance and attempts at eloping I am increasingly concerned [facility] does not have the capacity to keep Mr. Juárez safe  - in absence of a locked unit Mr. Juárez has a high risk of successfully eloping which would potentially be very detrimental   - will give a PRN now  - monitor evening sleepiness; historically escalating behaviors have been during daylight hours  - could consider re-timing Depakote to AM, or staggering multi- time/day dosing medications     Code Status: Full Code    MAIA Guadalupe, CNP  Hospitalist Service, House Officer  [facility]     Text Page  Pager: [phone]    Allergies   Allergies   Allergen Reactions     Ibuprofen      Latex        Physical Exam   Vital Signs with Ranges:  Temp:  [97.6  F (36.4  C)] 97.6  F (36.4  C)  Pulse:  [72] 72  Resp:  [18] 18  BP: (100)/(71) 100/71  SpO2:  [97 %] 97 %  I/O last 3 completed shifts:  In: 1320 [P.O.:1320]  Out: -     Constitutional: 55- year old male laying in bed without acute distress.    Pulmonary: He is not hypoxic. No obvious acute distress.   Cardiovascular: Appears well perfused.   GI: Non-distended.   Skin/Integumen: No obvious concerning rashes or lesions on exposed skin.   Neuro: No obvious focal neurologic deficit.   Psych:  Now calm, laying in bed.   Extremities: Moves all extremities.     Time Spent on this Encounter   I spent 15 minutes on the unit/floor  managing the care of John Juárez. Over 50% of my time was spent counseling the patient and/or coordinating care regarding services listed in this note.

## 2021-06-21 NOTE — PLAN OF CARE
DATE & TIME: 6/21/2021 7-3pm  Cognitive Concerns/ Orientation : Alert to self, whispers. Flat affect.   BEHAVIOR & AGGRESSION TOOL COLOR: green to Yellow.   ABNL VS/O2: Daily VSS on RA  MOBILITY: Independent in room, SBA in hallway; Noted to be QUEEN after walks.   PAIN MANAGMENT: denied pain  DIET: Regular. Good appetite.   BOWEL/BLADDER: Continent, up to BR  ABNL LAB/BG: COVID negative 6/17/21  DRAIN/DEVICES: No IV access  SKIN: Scattered bruising, scab on L shin  D/C DATE: Discharge pending guardianship and potential inpatient psych placement. SW following  OTHER IMPORTANT INFO: Sitter and door alarm in place. Patient left floor again, code 21 called/risperdal given once back to room. Commitment and court hold through 7/31/21. Re consulted psych/saw him today.

## 2021-06-21 NOTE — CONSULTS
Patient seen this morning, full note to follow      [facility] Psychiatric Consult Progress Note    Interval History:   Pt seen, chart reviewed, case discussed with nursing staff.  Per chart review he continues to experience some intermittent agitation, placement remains a challenge.  Last week I placed a call to Dr. Sorenson, he is the medical director for psychiatry and is on paternity leave.  I am going to place a call this morning to Dr. Batres-hoping that we can navigate a psychiatry transfer.  Currently he has no acute medical problems, the issues revolve entirely around his paranoia, confinement on the medical unit, need for psychiatric medication adjustment.  We are still waiting for approval to add Clozaril to his Yanez order.  This morning he was lying in bed, sleeping, he does rouse to voice, mumbles a few words at me.  He was not in any obvious distress but his behavioral issues fluctuate.  We have optimized Haldol and Risperdal at this point.  He did not tolerate higher doses of Depakote, became very sonorous with this, and his ammonia level was slightly elevated.  He is not reporting thoughts of wanting to hurt self or others but he is a very poor historian.  The  has placed a call to the [facility] given the challenges with placement, apparently [facility] facilities are prioritizing other types of patients, hence moving him to [place] is not readily available option. Recent engagement of family members to accept guardianship was not successful or according to a recent chart entry by the .   Review of systems:   10 point Review of Systems completed by Ssuhil Wilks MD  , and is  is negative other than noted in the HPI     Medications:       aspirin  81 mg Oral Daily     atorvastatin  80 mg Oral At Bedtime     benztropine  1 mg Oral BID     cholecalciferol  1,250 mcg Oral Q7 Days     divalproex sodium extended-release  500 mg Oral At Bedtime     docusate  "sodium  100 mg Oral BID     haloperidol  10 mg Oral TID     levothyroxine  88 mcg Oral Daily     memantine  5 mg Oral Daily     risperiDONE  3 mg Sublingual BID     salmeterol  1 puff Inhalation BID     vitamin E  800 Units Oral Daily     acetaminophen, albuterol, alum & mag hydroxide-simethicone, sore throat lozenge, haloperidol lactate, LORazepam, melatonin, naloxone **OR** naloxone **OR** naloxone **OR** naloxone, nicotine, nicotine, ondansetron **OR** ondansetron, polyethylene glycol, polyethylene glycol-propylene glycol, risperiDONE    Mental Status Examination:     Appearance: He is lying down, opens eyes to voice  Eye Contact: poor  speech:  Impoverished, dysarthric, monotone, speaks softly, slowly. slightly louder when he gets frustrated  language: Poor  Psychomotor Behavior:  some buccal-lingual dyskinesia, chewing movements  Mood: \"OK\"  affect: Blunted   thought Process:  paucity of speech, no obvious loosening of associations flight of ideas or formal thought disorder  Thought Content:  no evidence of suicidal ideation or homicidal ideation, but he appears paranoid, making delusional statements frequently when more awake and talking  per chart notes  Oriented to: x2    Recent and Remote Memory:  limited, states he does not know where he is, says year is 2029. States Ina is president.   Fund of Knowledge: delayed  Insight:  limited  Judgment:  limited         Labs/Vitals:     No results found for this or any previous visit (from the past 24 hour(s)).  B/P: 105/74, T: 97.4, P: 72, R: 18  Impression  John is a 55-year-old gentleman with a known major neurocognitive disorder and schizophrenic spectrum illness.  He continues to show significant impairment, poor insight, fluctuating levels of psychosis and agitation.  Trying to manage him on an open medical unit presents a continued challenge and in my opinion he should be transferred to a locked psychiatric unit.  I will continue to engage administration " in a discussion about transferring him to [facility]     1.  Schizophrenia, unspecified  2.  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  Alcohol use disorder in remission.   4.  Stimulant use disorder, in remission.   5.  Chronic obstructive pulmonary disease.   6.  Hypertension.   7. R/O mild TD vs EPSE    1.  Continue current psychotropic medications unchanged   2.  Guardianship and ultimate placement remains a significant issue.  I believe that he would best be managed on a locked psychiatric unit where his medications can be adequately monitored, he has more freedom to move around, more accurate assessment can be undertaken   3.  We are awaiting determination if Clozaril added to Yanez    4.  Continue commitment, Yanez, pursuit of guardianship-these issues are well documented in the current medical record  5. I placed a call to Dr Batres who is psychiatry director here at [facility] to discuss disposition    Attestation:  Patient has been seen and evaluated by me,  Sushil Wilks MD

## 2021-06-21 NOTE — PROGRESS NOTES
[facility]    Hospitalist Progress Note      Assessment & Plan   John Juárez is a 55 year old male with PMHx of schizophrenia, hx of TBI, alcohol use disorder, COPD, hyperlipidemia and hypothyroidism who was admitted on 9/9/2020 for evaluation of aggressive behavior at his group home.   Was evaluated by Psychiatry and his condition stabilized, though his group home was unwilling to take him back and thus hospitalization has been prolonged awaiting new placement and guardianship. Under civil commitment through [facility].  Noted increasingly agitated behavior and attempts at elopement in May 2021.    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  Brought to hospital for evaluation of aggressive behaviors. Group home now unwilling to take him back. Has had numerous CODE 21s called this stay. Seen by psych, meds adjusted. Is currently under civil commitment and court hold through [facility] until 7/31/21.  -Appreciate Psychiatry involvement  -Haldol increased to 10 mg TID on 5/23, because of increasing agitation.  -Benztropine 1mg BID for EPS.  -Valproic acid 500mg at bedtime.  -Door alarm, 1:1 as needed.  -SW following for placement, placement will occur once guardianship is established. The anticipated discharge is to [facility] which is a skilled nursing facility which has a secured male unit for patients with mental health diagnosis.  - 6/8/21 - consulted psychiatry again due to ongoing aggressive behaviors/hallucinations/delusions. See Dr. Murrieta's note from 6/8/21, appreciate his assistance.  - 6/9/21 - psychiatry saw patient again. Increased Risperdal to 3 mg PO BID. Pursuing transfer to inpatient psychiatric unit for ongoing care until guardian is appointed.  - consulted psychiatry for 6/15 and 6/17, consult noted, possible trial for clozaril  added to his Yanez petition.  [place] he is best served in inpatient psychiatry unit and possible long acting injectable for behavior control and people willing to take guardianship role.  - 6/21 Psych evaluated- continue commitment, still awaiting determination for clozaril--> trying to investigate transfer to [facility].    Vitamin D Deficiency  Vit D level 16, 17 in May and June. Has been in hospital since Sept 2020.  - started on cholecalciferol 00345 units on 6/17     Chronic/Stable/Resolved  Possible Tardive Dyskinesia vs EPSE  Per psych assessment on 4/1/21, noted to have possible tardive dyskinesia vs extrapyramidal side effects of meds.  - At that time, recommended to increase Cogentin to 1mg BID and add vitamin E 800 international unit(s) daily.      Hyperlipidemia  - Chronic and stable on low dose ASA and atorvastatin.     COPD  - Not O2 dependent.  - Chronic and stable on salmeterol, albuterol prn.     Hypothyroidism  - Chronic and stable on levothyroxine.     Tobacco use disorder  - Using nicotine patch and PRN gum.     Hx of infective endocarditis with severe mitral and aortic regurgitation S/P bioprosthetic valve replacement (10/2015)  Systolic heart murmur  HFrEF, not in acute exacerbation  Follows with Dr. Dockery of Heart and Vascular Cardiology, last seen in 1/2020. Note heart murmur on exam, strongest in aortic region. No CP, SOB, dizziness, syncope. Echocardiogram in 5/2016 with EF 50%, no evidence for prosthetic mitral and aortic valve dysfunction.   - Follow-up with outpatient Cardiology upon dismissal from the hospital (on every 2 year follow-ups).  - Continue ASA.     Hx of CVA  - Note basal ganglia stroke in 2015. No focal neuro deficits aside from cognitive dysfunction as above.      Non-severe malnutrition  - Dietitian consulted.    DVT Prophylaxis: Ambulate  Code Status: Full Code  Expected discharge: once placement found    Shamika Rowan, DO  Text Page (7am - 6pm)    Interval  History   Patient seen and examined. Still with ongoing intermittent ativan needs for agitation. This morning he is mostly calm. Sitter in room. No complaints otherwise, not participating much in conversation.    -Data reviewed today: I reviewed all new labs and imaging results over the last 24 hours. I personally reviewed no images or EKG's today.    Physical Exam   Temp: 97.6  F (36.4  C) Temp src: Oral BP: 100/71 Pulse: 72   Resp: 18 SpO2: 97 % O2 Device: None (Room air)    Vitals:    12/11/20 0700 03/19/21 2120 03/31/21 1700   Weight: 82.6 kg (182 lb) 84.4 kg (186 lb) 84.2 kg (185 lb 9.6 oz)     Vital Signs with Ranges  Temp:  [97.6  F (36.4  C)] 97.6  F (36.4  C)  Pulse:  [72] 72  Resp:  [18] 18  BP: (100)/(71) 100/71  SpO2:  [97 %] 97 %  I/O last 3 completed shifts:  In: 1320 [P.O.:1320]  Out: -     Constitutional: Awake, laying in bed, mild agitation with questioning  Respiratory: no increased work of breathing, no audible wheeze, clear on auscultation  Cardiovascular: regular rate and rhythm, no murmurs  GI: soft, bowel sounds present, nontender  Skin/Integumen: No rashes, no cyanosis, no edema  Other: Quiet speech    Medications       aspirin  81 mg Oral Daily     atorvastatin  80 mg Oral At Bedtime     benztropine  1 mg Oral BID     cholecalciferol  1,250 mcg Oral Q7 Days     divalproex sodium extended-release  500 mg Oral At Bedtime     docusate sodium  100 mg Oral BID     haloperidol  10 mg Oral TID     levothyroxine  88 mcg Oral Daily     memantine  5 mg Oral Daily     risperiDONE  3 mg Sublingual BID     salmeterol  1 puff Inhalation BID     vitamin E  800 Units Oral Daily       Data   Recent Labs   Lab 06/18/21  0735   WBC 5.7   HGB 15.3   MCV 94   *      POTASSIUM 3.9   CHLORIDE 111*   CO2 22   BUN 15   CR 0.79   ANIONGAP 8   LESLEE 8.0*   *       No results found for this or any previous visit (from the past 24 hour(s)).

## 2021-06-22 ENCOUNTER — TELEPHONE (OUTPATIENT)
Dept: BEHAVIORAL HEALTH | Facility: CLINIC | Age: 56
End: 2021-06-22

## 2021-06-22 PROCEDURE — 99232 SBSQ HOSP IP/OBS MODERATE 35: CPT | Performed by: HOSPITALIST

## 2021-06-22 PROCEDURE — 250N000013 HC RX MED GY IP 250 OP 250 PS 637: Performed by: HOSPITALIST

## 2021-06-22 PROCEDURE — 250N000013 HC RX MED GY IP 250 OP 250 PS 637: Performed by: INTERNAL MEDICINE

## 2021-06-22 PROCEDURE — 250N000013 HC RX MED GY IP 250 OP 250 PS 637: Performed by: PSYCHIATRY & NEUROLOGY

## 2021-06-22 PROCEDURE — 99232 SBSQ HOSP IP/OBS MODERATE 35: CPT | Performed by: PSYCHIATRY & NEUROLOGY

## 2021-06-22 PROCEDURE — 120N000001 HC R&B MED SURG/OB

## 2021-06-22 RX ORDER — RISPERIDONE 1 MG/1
1 TABLET, ORALLY DISINTEGRATING ORAL DAILY
Status: DISCONTINUED | OUTPATIENT
Start: 2021-06-22 | End: 2021-06-30 | Stop reason: HOSPADM

## 2021-06-22 RX ORDER — GUAIFENESIN 600 MG/1
600 TABLET, EXTENDED RELEASE ORAL 2 TIMES DAILY
Status: DISCONTINUED | OUTPATIENT
Start: 2021-06-22 | End: 2021-06-30 | Stop reason: HOSPADM

## 2021-06-22 RX ADMIN — MEMANTINE 5 MG: 5 TABLET ORAL at 08:03

## 2021-06-22 RX ADMIN — RISPERIDONE 1 MG: 1 TABLET, ORALLY DISINTEGRATING ORAL at 10:36

## 2021-06-22 RX ADMIN — LORAZEPAM 1 MG: 0.5 TABLET ORAL at 15:21

## 2021-06-22 RX ADMIN — SALMETEROL XINAFOATE 1 PUFF: 50 POWDER, METERED ORAL; RESPIRATORY (INHALATION) at 20:20

## 2021-06-22 RX ADMIN — RISPERIDONE 3 MG: 1 TABLET, ORALLY DISINTEGRATING ORAL at 20:16

## 2021-06-22 RX ADMIN — HALOPERIDOL 10 MG: 5 TABLET ORAL at 14:15

## 2021-06-22 RX ADMIN — ASPIRIN 81 MG: 81 TABLET, COATED ORAL at 08:03

## 2021-06-22 RX ADMIN — LEVOTHYROXINE SODIUM 88 MCG: 88 TABLET ORAL at 08:03

## 2021-06-22 RX ADMIN — BENZTROPINE MESYLATE 1 MG: 0.5 TABLET ORAL at 20:16

## 2021-06-22 RX ADMIN — DOCUSATE SODIUM 100 MG: 100 CAPSULE, LIQUID FILLED ORAL at 20:15

## 2021-06-22 RX ADMIN — LORAZEPAM 1 MG: 0.5 TABLET ORAL at 21:36

## 2021-06-22 RX ADMIN — GUAIFENESIN 600 MG: 600 TABLET, EXTENDED RELEASE ORAL at 20:16

## 2021-06-22 RX ADMIN — RISPERIDONE 1 MG: 1 TABLET, ORALLY DISINTEGRATING ORAL at 12:33

## 2021-06-22 RX ADMIN — DIVALPROEX SODIUM 500 MG: 500 TABLET, FILM COATED, EXTENDED RELEASE ORAL at 20:20

## 2021-06-22 RX ADMIN — RISPERIDONE 3 MG: 1 TABLET, ORALLY DISINTEGRATING ORAL at 08:03

## 2021-06-22 RX ADMIN — HALOPERIDOL 10 MG: 5 TABLET ORAL at 20:16

## 2021-06-22 RX ADMIN — GUAIFENESIN 600 MG: 600 TABLET, EXTENDED RELEASE ORAL at 12:33

## 2021-06-22 RX ADMIN — Medication 800 UNITS: at 08:03

## 2021-06-22 RX ADMIN — HALOPERIDOL 10 MG: 5 TABLET ORAL at 08:03

## 2021-06-22 RX ADMIN — BENZTROPINE MESYLATE 1 MG: 0.5 TABLET ORAL at 08:03

## 2021-06-22 RX ADMIN — ATORVASTATIN CALCIUM 80 MG: 40 TABLET, FILM COATED ORAL at 20:16

## 2021-06-22 ASSESSMENT — ACTIVITIES OF DAILY LIVING (ADL)
ADLS_ACUITY_SCORE: 14

## 2021-06-22 NOTE — PLAN OF CARE
DATE & TIME: 6/21/2021 9117-1154  Cognitive Concerns/ Orientation : Alert to self and place, whispers. Flat affect.   BEHAVIOR & AGGRESSION TOOL COLOR: Yellow at times  ABNL VS/O2: Daily VSS on RA  MOBILITY: Independent in room, SBA in hallway; Noted to be QUEEN after walks.   PAIN MANAGMENT: denied pain  DIET: Regular. Good appetite.   BOWEL/BLADDER: Continent, up to BR  ABNL LAB/BG: no labs today  DRAIN/DEVICES: No IV access  SKIN: Scattered bruising, scab on L shin  D/C DATE: Discharge pending guardianship and potential inpatient psych placement. SW following  OTHER IMPORTANT INFO: Sitter and door alarm in place. Patient tried to elope x1 this shift, code 21 called- pt redirected to 6th floor with security/psych associate. PT grabbed sitter's wrists and pulled her into the elevator. Scheduled medications given. Commitment and court hold through 7/31/21. Re-consulted psych/saw him today.

## 2021-06-22 NOTE — PROGRESS NOTES
Care Management Follow Up    Length of Stay (days): 276    Expected Discharge Date: 07/10/21     Concerns to be Addressed: patient is non decisional, needs guardian.  Family not comfortable in assuming role of guardian.  Hospital is pursuing guardianship once a professional guardian is secure. Once patient has a guardian he can be transferred to a care facility.  Patient plan of care discussed at interdisciplinary rounds: Yes    Anticipated Discharge Disposition: (residential care home or nursing home)     Anticipated Discharge Services: None  Anticipated Discharge DME: None    Patient/family educated on Medicare website which has current facility and service quality ratings: (not applicable)  Education Provided on the Discharge Plan:    Patient/Family in Agreement with the Plan: no    Referrals Placed by CM/SW: Post Acute Facilities  Private pay costs discussed: Not applicable    Additional Information:  Writer contacted the [place] Office of [facility].  Writer spoke with Asif at [phone].  Explained patient's status of MI Commitment and LOS here due to patient's non decisional status and need of a guardian.  Explained neither patient's Behavioral  or the outside  have been able to locate a professional guardian to accept John. Explained several guardians have stated they do not work with clients who are under [facility] jurisdiction.   Asif reports this is the first time he has heard about te reluctance of guardians to accept a client thru [facility].  He will speak with his co-workers to ask if they have any suggestions.     Today writer also spoke with [facility] tatiana Ross at [phone] to ask when patient will have his hearing for extension of MI commitment and continuance of Yanez Order.  The examination and hearing is scheduled for July 8th.  Writer explained the psychiatrist would like to start Clorazil as soon as possible.  When Ms  Vandana reviewed the existing commitment order she shared because patient is committed to [facility], we can present an amendment to the current Yanez Court order requesting immediate initiation of Clorazil.  This will be presented to the Court and patient's .  If patient's  has no objection a new Yanez order is written allowing the psychiatrist to order Clozaril.  Call placed late to Dr Murrieta and he was in clinic.  So will ask Dr Wilks in he morning if he supports the amendment to the existing Yanez order and will then fax the request to Ms Ross on Wednesday.      Lulu Contreras, ASIMSW

## 2021-06-22 NOTE — CODE/RAPID RESPONSE
BRIEF HOUSE OFFICER NOTE:    CODE 21  Behavior disturbance secondary to underlying mental health and neurocognitive disorders  John attempted to elope for a second time today this evening. He did grab a staff member by her wrists this time and drag her onto the elevator with him. He only went to the 5th floor this evening and then staff were able to safely return the patient to his room. He willing took his medications. Staff member was not injured during event. Please send Sandra Estrada's note from code 21 earlier today regarding concerns about overall safety of the patient remaining at [facility] due to elopement attempts and medications adjustments.    Anahi De, APRN CNP  Text Page

## 2021-06-22 NOTE — CONSULTS
Patient seen this morning, full note to follow      [facility] Psychiatric Consult Progress Note    Interval History:   Pt seen, chart reviewed, case discussed with nursing staff.  Per chart review he continues to experience some intermittent agitation, placement remains a challenge.  Nursing reports he has been struggling at times with being in the hospital and attempted to leave multiple times on Monday. He was able to briefly outside yesterday. On interview he reports he is doing ok. Denies feeling depressed or anxious. Denies SI/HI. Denies pain. Reports intact appetite. Denies any physical health concerns today. Reports adherence with medications and reports that he is not having side effects.   Review of systems:   10 point Review of Systems completed by Lio Murrieta MD  , and is  is negative other than noted in the HPI     Medications:       aspirin  81 mg Oral Daily     atorvastatin  80 mg Oral At Bedtime     benztropine  1 mg Oral BID     cholecalciferol  1,250 mcg Oral Q7 Days     divalproex sodium extended-release  500 mg Oral At Bedtime     docusate sodium  100 mg Oral BID     guaiFENesin  600 mg Oral BID     haloperidol  10 mg Oral TID     levothyroxine  88 mcg Oral Daily     memantine  5 mg Oral Daily     risperiDONE  1 mg Sublingual Daily     risperiDONE  3 mg Sublingual BID     salmeterol  1 puff Inhalation BID     vitamin E  800 Units Oral Daily     acetaminophen, albuterol, alum & mag hydroxide-simethicone, sore throat lozenge, haloperidol lactate, LORazepam, melatonin, naloxone **OR** naloxone **OR** naloxone **OR** naloxone, nicotine, nicotine, ondansetron **OR** ondansetron, polyethylene glycol, polyethylene glycol-propylene glycol, risperiDONE    Mental Status Examination:     Appearance: He is lying down, opens eyes to voice  Eye Contact: poor  speech:  Impoverished, dysarthric, monotone, speaks softly, slowly. slightly louder when he gets frustrated  language:  "Poor  Psychomotor Behavior:  some buccal-lingual dyskinesia, chewing movements  Mood: \"OK\"  affect: Blunted   thought Process:  paucity of speech, no obvious loosening of associations flight of ideas or formal thought disorder  Thought Content:  no evidence of suicidal ideation or homicidal ideation, but he appears paranoid, making delusional statements frequently when more awake and talking  per chart notes  Oriented to: x2    Recent and Remote Memory:  limited, states he does not know where he is, says year is 2029. States Ina is president.   Fund of Knowledge: delayed  Insight:  limited  Judgment:  limited         Labs/Vitals:     No results found for this or any previous visit (from the past 24 hour(s)).  B/P: 105/74, T: 97.4, P: 72, R: 18  Impression  John is a 55-year-old gentleman with a known major neurocognitive disorder and schizophrenic spectrum illness.  He continues to show significant impairment, poor insight, fluctuating levels of psychosis and agitation.  Trying to manage him on an open medical unit presents a continued challenge and in my opinion he should be transferred to a locked psychiatric unit.  I will continue to engage administration in a discussion about transferring him to [facility]     1.  Schizophrenia, unspecified  2.  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  Alcohol use disorder in remission.   4.  Stimulant use disorder, in remission.   5.  Chronic obstructive pulmonary disease.   6.  Hypertension.   7. R/O mild TD vs EPSE    1.  Continue current psychotropic medications unchanged. Already maximized typical daily Risperdal and Haldol dosing. We are considering clozapine as next step but will need  To update Yanez  2.  Guardianship and ultimate placement remains a significant issue.  Consult psychiatry including this writer and Dr. Wilks believe that he would best be managed on a locked psychiatric unit where his medications " can be adequately monitored, he has more freedom to move around, more accurate assessment can be undertaken   3.  We are awaiting determination if Clozaril added to Yanez  -- we would remove Olanzapine as only 4 are allowed on the Yanez.  4.  Continue commitment, Yanez, pursuit of guardianship-these issues are well documented in the current medical record  5. Placed patient on waiting list for [facility] evaluation  6. Continue PRN's, nursing indicates they are effective as reducing his agitaiton and he is typically adherent with oral when recommended by nursing.   7. Re-consult psych  Attestation:  Patient has been seen and evaluated by me,  Lio Murrieta MD

## 2021-06-22 NOTE — PLAN OF CARE
DATE & TIME: 6/22/2021 9984-2356  Cognitive Concerns/ Orientation : Alert to self and place, whispers. Flat affect.   BEHAVIOR & AGGRESSION TOOL COLOR: Yellow at times, gets restless and agitated at times.  ABNL VS/O2: VSS on RA  MOBILITY: Independent in room, SBA in hallway- unsteady at times  PAIN MANAGMENT: denies  DIET: Regular. Good appetite.   BOWEL/BLADDER: Continent, up to BR  ABNL LAB/BG: no labs today  DRAIN/DEVICES: No IV access  SKIN: Scattered bruising, scab on L shin  D/C DATE: Discharge pending guardianship and potential inpatient psych placement. SW following  OTHER IMPORTANT INFO: Sitter and door alarm in place. Commitment and court hold through 7/31/21. Psych re-consulted- Dr. Rowan added 1 mg Risperadol mid day to help with patient behaviors. Has intermittent hallucinations visual/auditory. Will punch fist into hand at times in frustration/anger. Given PRN risperadol and ativan x1. Encouraged ambulation, games, music, TV to keep patient busy. Patient has new cough past couple days, no SOB, LS nils, MD notified, started on mucinex.

## 2021-06-22 NOTE — PROGRESS NOTES
[facility]    Hospitalist Progress Note      Assessment & Plan   John Juárez is a 55 year old male with PMHx of schizophrenia, hx of TBI, alcohol use disorder, COPD, hyperlipidemia and hypothyroidism who was admitted on 9/9/2020 for evaluation of aggressive behavior at his group home.   Was evaluated by Psychiatry and his condition stabilized, though his group home was unwilling to take him back and thus hospitalization has been prolonged awaiting new placement and guardianship. Under civil commitment through [facility].  Noted increasingly agitated behavior and attempts at elopement in May 2021.    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  Brought to hospital for evaluation of aggressive behaviors. Group home now unwilling to take him back. Has had numerous CODE 21s called this stay. Seen by psych, meds adjusted. Is currently under civil commitment and court hold through [facility] until 7/31/21.  -Appreciate Psychiatry involvement  -Haldol increased to 10 mg TID on 5/23, because of increasing agitation.  -Benztropine 1mg BID for EPS.  -Valproic acid 500mg at bedtime.  -Door alarm, 1:1 as needed.  -SW following for placement, placement will occur once guardianship is established. The anticipated discharge is to [facility] which is a skilled nursing facility which has a secured male unit for patients with mental health diagnosis.  - 6/8/21 - consulted psychiatry again due to ongoing aggressive behaviors/hallucinations/delusions. See Dr. Murrieta's note from 6/8/21, appreciate his assistance.  - 6/9/21 - psychiatry saw patient again. Increased Risperdal to 3 mg PO BID. Pursuing transfer to inpatient psychiatric unit for ongoing care until guardian is appointed.  - consulted psychiatry for 6/15 and 6/17, consult noted, possible trial for clozaril  added to his Yanez petition.  Amado he is best served in inpatient psychiatry unit and possible long acting injectable for behavior control and people willing to take guardianship role.  - 6/21 Psych evaluated- continue commitment, still awaiting determination for clozaril--> trying to investigate transfer to [facility]. Patient also tried to elope x2, had code 21s.   - 6/22 reconsult psych for med adjustment, most of his behaviors are during day---added scheduled 1mg risperdal in afternoon    Vitamin D Deficiency  Vit D level 16, 17 in May and June. Has been in hospital since Sept 2020.  - started on cholecalciferol 88617 units on 6/17     Chronic/Stable/Resolved  Possible Tardive Dyskinesia vs EPSE  Per psych assessment on 4/1/21, noted to have possible tardive dyskinesia vs extrapyramidal side effects of meds.  - At that time, recommended to increase Cogentin to 1mg BID and add vitamin E 800 international unit(s) daily.      Hyperlipidemia  - Chronic and stable on low dose ASA and atorvastatin.     COPD  - Not O2 dependent. PTA on salmeterol and albuterol PRN  - occasionally refusing salmeterol  - 6/22 noted to have a cough intermittently over past few days--dry  - add mucinex BID  - encourage inhaler compliance     Hypothyroidism  - Chronic and stable on levothyroxine.     Tobacco use disorder  - Using nicotine patch and PRN gum.     Hx of infective endocarditis with severe mitral and aortic regurgitation S/P bioprosthetic valve replacement (10/2015)  Systolic heart murmur  HFrEF, not in acute exacerbation  Follows with Dr. Dockery of Heart and Vascular Cardiology, last seen in 1/2020. Note heart murmur on exam, strongest in aortic region. No CP, SOB, dizziness, syncope. Echocardiogram in 5/2016 with EF 50%, no evidence for prosthetic mitral and aortic valve dysfunction.   - Follow-up with outpatient Cardiology upon dismissal from the hospital (on every 2 year follow-ups).  - Continue ASA.     Hx of  CVA  - Note basal ganglia stroke in 2015. No focal neuro deficits aside from cognitive dysfunction as above.      Non-severe malnutrition  - Dietitian consulted.    DVT Prophylaxis: Ambulate  Code Status: Full Code  Expected discharge: once placement found    Shamika Rowan, DO  Text Page (7am - 6pm)    Interval History   Patient seen and examined. Required risperdal mid-morning for agitation, given dose again in afternoon to try to bridge for day-time agitation. He reports dry cough, intermittently. Denies sputum production. Denies wanting any intervention including inhaler or cough medications. Still with sitter and door alarm. Asks when he can leave.  Had two CODE 21s day prior.    -Data reviewed today: I reviewed all new labs and imaging results over the last 24 hours. I personally reviewed no images or EKG's today.    Physical Exam   Temp: 98.2  F (36.8  C) Temp src: Axillary BP: 99/75 Pulse: 62   Resp: 18 SpO2: 96 % O2 Device: None (Room air)    Vitals:    12/11/20 0700 03/19/21 2120 03/31/21 1700   Weight: 82.6 kg (182 lb) 84.4 kg (186 lb) 84.2 kg (185 lb 9.6 oz)     Vital Signs with Ranges  Temp:  [98.2  F (36.8  C)] 98.2  F (36.8  C)  Pulse:  [62] 62  Resp:  [18] 18  BP: (99)/(75) 99/75  SpO2:  [96 %] 96 %  No intake/output data recorded.    Constitutional: Awake, laying in bed, mild agitation with questioning  Respiratory: no increased work of breathing, no audible wheeze, clear on auscultation, no cough heard while in room  Cardiovascular: regular rate and rhythm, no murmurs  GI: soft, bowel sounds present, nontender  Skin/Integumen: No rashes, no cyanosis, no edema  Other: Quiet speech    Medications       aspirin  81 mg Oral Daily     atorvastatin  80 mg Oral At Bedtime     benztropine  1 mg Oral BID     cholecalciferol  1,250 mcg Oral Q7 Days     divalproex sodium extended-release  500 mg Oral At Bedtime     docusate sodium  100 mg Oral BID     guaiFENesin  600 mg Oral BID     haloperidol  10 mg Oral  TID     levothyroxine  88 mcg Oral Daily     memantine  5 mg Oral Daily     risperiDONE  1 mg Sublingual Daily     risperiDONE  3 mg Sublingual BID     salmeterol  1 puff Inhalation BID     vitamin E  800 Units Oral Daily       Data   Recent Labs   Lab 06/18/21  0735   WBC 5.7   HGB 15.3   MCV 94   *      POTASSIUM 3.9   CHLORIDE 111*   CO2 22   BUN 15   CR 0.79   ANIONGAP 8   LESLEE 8.0*   *       No results found for this or any previous visit (from the past 24 hour(s)).

## 2021-06-22 NOTE — PLAN OF CARE
DATE & TIME: 6/21/2021 1780-3046  Cognitive Concerns/ Orientation : Alert to self and place, whispers. Flat affect.   BEHAVIOR & AGGRESSION TOOL COLOR: Yellow at times  ABNL VS/O2: Daily VSS on RA  MOBILITY: Independent in room, SBA in hallway; Noted to be QUEEN after walks.   PAIN MANAGMENT: denied pain  DIET: Regular. Good appetite.   BOWEL/BLADDER: Continent, up to BR  ABNL LAB/BG: no labs today  DRAIN/DEVICES: No IV access  SKIN: Scattered bruising, scab on L shin  D/C DATE: Discharge pending guardianship and potential inpatient psych placement. SW following  OTHER IMPORTANT INFO: Sitter and door alarm in place. Patient tried to elope x2 yesterday. Commitment and court hold through 7/31/21. Re-consulted psych/saw him yesterday.

## 2021-06-22 NOTE — TELEPHONE ENCOUNTER
"S: 4:40 pm Received a call from Dr. Murrieta requesting patient transfer to psych to better manage his meds and his behaviors.     B: Per Dr. Wilks's recent note: \"Last week I placed a call to Dr. Sorenson, he is the medical director for psychiatry and is on paternity leave.  I am going to place a call this morning to Dr. Batres-hoping that we can navigate a psychiatry transfer.  Currently he has no acute medical problems, the issues revolve entirely around his paranoia, confinement on the medical unit, need for psychiatric medication adjustment.  We are still waiting for approval to add Clozaril to his Yanez order.  This morning he was lying in bed, sleeping, he does rouse to voice, mumbles a few words at me.  He was not in any obvious distress but his behavioral issues fluctuate.  We have optimized Haldol and Risperdal at this point.  He did not tolerate higher doses of Depakote, became very sonorous with this, and his ammonia level was slightly elevated.  He is not reporting thoughts of wanting to hurt self or others but he is a very poor historian.  The  has placed a call to the [facility] given the challenges with placement, apparently [facility] facilities are prioritizing other types of patients, hence moving him to [place] is not readily available option. Recent engagement of family members to accept guardianship was not successful or according to a recent chart entry by the .\"    A: court hold until 7/31    R: awaiting placement.  Pt placed on the adult worklist    Patient cleared and ready for behavioral bed placement: Yes    "

## 2021-06-23 LAB — GLUCOSE BLDC GLUCOMTR-MCNC: 128 MG/DL (ref 70–99)

## 2021-06-23 PROCEDURE — 999N001017 HC STATISTIC GLUCOSE BY METER IP

## 2021-06-23 PROCEDURE — 250N000013 HC RX MED GY IP 250 OP 250 PS 637: Performed by: PSYCHIATRY & NEUROLOGY

## 2021-06-23 PROCEDURE — 250N000013 HC RX MED GY IP 250 OP 250 PS 637: Performed by: INTERNAL MEDICINE

## 2021-06-23 PROCEDURE — 99231 SBSQ HOSP IP/OBS SF/LOW 25: CPT | Performed by: HOSPITALIST

## 2021-06-23 PROCEDURE — 120N000001 HC R&B MED SURG/OB

## 2021-06-23 PROCEDURE — 250N000011 HC RX IP 250 OP 636: Performed by: INTERNAL MEDICINE

## 2021-06-23 PROCEDURE — 250N000013 HC RX MED GY IP 250 OP 250 PS 637: Performed by: HOSPITALIST

## 2021-06-23 RX ORDER — LORAZEPAM 1 MG/1
1 TABLET ORAL EVERY 6 HOURS PRN
Qty: 20 TABLET | Refills: 0 | Status: ON HOLD | OUTPATIENT
Start: 2021-06-23 | End: 2022-01-13

## 2021-06-23 RX ADMIN — HALOPERIDOL 10 MG: 5 TABLET ORAL at 08:14

## 2021-06-23 RX ADMIN — DOCUSATE SODIUM 100 MG: 100 CAPSULE, LIQUID FILLED ORAL at 08:14

## 2021-06-23 RX ADMIN — LORAZEPAM 1 MG: 0.5 TABLET ORAL at 15:10

## 2021-06-23 RX ADMIN — DIVALPROEX SODIUM 500 MG: 500 TABLET, FILM COATED, EXTENDED RELEASE ORAL at 20:54

## 2021-06-23 RX ADMIN — RISPERIDONE 1 MG: 1 TABLET, ORALLY DISINTEGRATING ORAL at 12:22

## 2021-06-23 RX ADMIN — DOCUSATE SODIUM 100 MG: 100 CAPSULE, LIQUID FILLED ORAL at 20:53

## 2021-06-23 RX ADMIN — SALMETEROL XINAFOATE 1 PUFF: 50 POWDER, METERED ORAL; RESPIRATORY (INHALATION) at 20:57

## 2021-06-23 RX ADMIN — HALOPERIDOL LACTATE 10 MG: 5 INJECTION, SOLUTION INTRAMUSCULAR at 18:03

## 2021-06-23 RX ADMIN — GUAIFENESIN 600 MG: 600 TABLET, EXTENDED RELEASE ORAL at 20:53

## 2021-06-23 RX ADMIN — HALOPERIDOL 10 MG: 5 TABLET ORAL at 13:23

## 2021-06-23 RX ADMIN — RISPERIDONE 3 MG: 1 TABLET, ORALLY DISINTEGRATING ORAL at 08:14

## 2021-06-23 RX ADMIN — BENZTROPINE MESYLATE 1 MG: 0.5 TABLET ORAL at 08:14

## 2021-06-23 RX ADMIN — ASPIRIN 81 MG: 81 TABLET, COATED ORAL at 08:14

## 2021-06-23 RX ADMIN — BENZTROPINE MESYLATE 1 MG: 0.5 TABLET ORAL at 20:54

## 2021-06-23 RX ADMIN — MEMANTINE 5 MG: 5 TABLET ORAL at 08:14

## 2021-06-23 RX ADMIN — RISPERIDONE 3 MG: 1 TABLET, ORALLY DISINTEGRATING ORAL at 20:54

## 2021-06-23 RX ADMIN — Medication 800 UNITS: at 08:14

## 2021-06-23 RX ADMIN — SALMETEROL XINAFOATE 1 PUFF: 50 POWDER, METERED ORAL; RESPIRATORY (INHALATION) at 08:17

## 2021-06-23 RX ADMIN — RISPERIDONE 1 MG: 1 TABLET, ORALLY DISINTEGRATING ORAL at 16:21

## 2021-06-23 RX ADMIN — ATORVASTATIN CALCIUM 80 MG: 40 TABLET, FILM COATED ORAL at 20:54

## 2021-06-23 RX ADMIN — HALOPERIDOL 10 MG: 5 TABLET ORAL at 20:54

## 2021-06-23 RX ADMIN — GUAIFENESIN 600 MG: 600 TABLET, EXTENDED RELEASE ORAL at 08:14

## 2021-06-23 RX ADMIN — LEVOTHYROXINE SODIUM 88 MCG: 88 TABLET ORAL at 08:14

## 2021-06-23 ASSESSMENT — ACTIVITIES OF DAILY LIVING (ADL)
ADLS_ACUITY_SCORE: 14

## 2021-06-23 NOTE — PROGRESS NOTES
House NP note    Responded to code 21.      55 year old male with PMHx of schizophrenia, hx of TBI, alcohol use disorder, COPD, hyperlipidemia and hypothyroidism who was admitted on 9/9/2020 for evaluation of aggressive behavior at his group home.   Was evaluated by Psychiatry and his condition stabilized, though his group home was unwilling to take him back and thus hospitalization has been prolonged awaiting new placement and guardianship. Under civil commitment through [facility]. Noted increasingly agitated behavior and attempts at elopement in May 2021.    Patient took off running down the stairs he needed to first floor was running towards MRI.  He was able to be returned to his room by nursing staff.  Impression: Attempted elopement  -As needed Haldol is being given by nursing staff.  -Recommend obtaining blood sugar and vital signs    No charge note    Viviane Donald CNP  Hospitalist - [place] [facility]  [phone]     Text Page

## 2021-06-23 NOTE — PROGRESS NOTES
"Received a call  from Aneesh in central intake he said \"I need to get an update on John for a potential transfer to mental health\". After update Aneesh said he will talk to his supervisor and call me back with an update. He said I will call you back either way to day.     "

## 2021-06-23 NOTE — PROGRESS NOTES
Code 21 called as patient again eloped unit. Ran down stair well and tried to enter MRI, staff had to hold him back from entering the room and he was physically pulling them. No one was injured to writers knowledge. Security arrived and escorted patient back to room. ALEJANDRO zavala given. Sitter bedside. Reoriented patient to plan of care and that SW and psych are working on getting patient to psych unit. Patient verbally upset, but laid in bed.

## 2021-06-23 NOTE — PLAN OF CARE
Summary: Dementia w/aggression  DATE & TIME: 6/23/2021 7642-5505  Cognitive Concerns/ Orientation : Alert to self and place, whispers. Flat affect.   BEHAVIOR & AGGRESSION TOOL COLOR: Yellow at times, gets restless and agitated at times. Code 21 called this evening as patient eloped again, given IM haldol  ABNL VS/O2: VSS on RA  MOBILITY: Independent in room, SBA in hallway- unsteady at times  PAIN MANAGMENT: denies  DIET: Regular. Good appetite.   BOWEL/BLADDER: Continent, up to BR  ABNL LAB/BG: no labs today  DRAIN/DEVICES: No IV access  SKIN: Scattered bruising, scab on L shin  D/C DATE: Discharge pending guardianship and potential inpatient psych placement. SW following  OTHER IMPORTANT INFO: Sitter and door alarm in place. Commitment and court hold through 7/31/21, psych is trying to get clozaril added to Yanez (see SW note). Has intermittent hallucinations visual/auditory. Will punch fist into hand at times in frustration/anger.  Encouraged ambulation, games, music, TV to keep patient busy.

## 2021-06-23 NOTE — PROGRESS NOTES
Care Management Follow Up    Length of Stay (days): 277    Expected Discharge Date: 08/23/21     Concerns to be Addressed: patient refuses services, discharge planning     Patient plan of care discussed at interdisciplinary rounds: Yes    Anticipated Discharge Disposition: (residential care home or nursing home)     Anticipated Discharge Services: None  Anticipated Discharge DME: None    Patient/family educated on Medicare website which has current facility and service quality ratings: (not applicable)  Education Provided on the Discharge Plan:    Patient/Family in Agreement with the Plan: no    Referrals Placed by CM/SW: Post Acute Facilities  Private pay costs discussed: Not applicable    Additional Information:  Letter submitted to [facility] Attorney's office requesting amendment to current Yanez Order.  Requested Zyprexa be removed and Clozaril be added.    The letter was processed today by tatiana Molina - she reports she sent the request to patient's . Ms Ross's phone number is [phone].  If the  agree's to the request, the court will fax a order back allowing the use of Clozaril.  If we receive the approval, Dr Murrieta or Dr Wilks need to be notified.    ASIM CastilloSW

## 2021-06-23 NOTE — PLAN OF CARE
DATE & TIME: 6/22/2021 3785-6161  Cognitive Concerns/ Orientation : Alert to self only, whispers. Flat affect.   BEHAVIOR & AGGRESSION TOOL COLOR: Yellow, gets restless and agitated at times.  ABNL VS/O2: VSS on RA  MOBILITY: Independent in room, SBA in hallway  PAIN MANAGMENT: denies  DIET: Regular. Good appetite.   BOWEL/BLADDER: Continent, up to BR  ABNL LAB/BG: no labs today  DRAIN/DEVICES: No IV access  SKIN: Scattered bruising, scab on L shin  D/C DATE: Discharge pending guardianship and potential inpatient psych placement. SW following  OTHER IMPORTANT INFO: Sitter and door alarm in place. Commitment and court hold through 7/31/21. Psych re-consulted. Patient has new cough past couple days, no SOB, LS diminished, MD aware, started on mucinex. PRN ativan given x1 this shift.

## 2021-06-23 NOTE — PROGRESS NOTES
[facility]    Hospitalist Progress Note      Assessment & Plan   John Juárez is a 55 year old male with PMHx of schizophrenia, hx of TBI, alcohol use disorder, COPD, hyperlipidemia and hypothyroidism who was admitted on 9/9/2020 for evaluation of aggressive behavior at his group home.   Was evaluated by Psychiatry and his condition stabilized, though his group home was unwilling to take him back and thus hospitalization has been prolonged awaiting new placement and guardianship. Under civil commitment through [facility].  Noted increasingly agitated behavior and attempts at elopement in May 2021.    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  Brought to hospital for evaluation of aggressive behaviors. Group home now unwilling to take him back. Has had numerous CODE 21s called this stay. Seen by psych, meds adjusted. Is currently under civil commitment and court hold through [facility] until 7/31/21.  -Appreciate Psychiatry involvement  -Haldol increased to 10 mg TID on 5/23, because of increasing agitation.  -Benztropine 1mg BID for EPS.  -Valproic acid 500mg at bedtime.  -Door alarm, 1:1 as needed.  -SW following for placement, placement will occur once guardianship is established. The anticipated discharge is to [facility] which is a skilled nursing facility which has a secured male unit for patients with mental health diagnosis.  - 6/8/21 - consulted psychiatry again due to ongoing aggressive behaviors/hallucinations/delusions. See Dr. Murrieta's note from 6/8/21, appreciate his assistance.  - 6/9/21 - psychiatry saw patient again. Increased Risperdal to 3 mg PO BID. Pursuing transfer to inpatient psychiatric unit for ongoing care until guardian is appointed.  - consulted psychiatry for 6/15 and 6/17, consult noted, possible trial for clozaril  added to his Yanez petition.  [place] he is best served in inpatient psychiatry unit and possible long acting injectable for behavior control and people willing to take guardianship role.  - 6/21 Psych evaluated- continue commitment, still awaiting determination for clozaril--> trying to investigate transfer to [facility]. Patient also tried to elope x2, had code 21s.   - 6/22 reconsult psych for med adjustment, most of his behaviors are during day---added scheduled 1mg risperdal in afternoon    Vitamin D Deficiency  Vit D level 16, 17 in May and June. Has been in hospital since Sept 2020.  - started on cholecalciferol 12959 units on 6/17     Chronic/Stable/Resolved  Possible Tardive Dyskinesia vs EPSE  Per psych assessment on 4/1/21, noted to have possible tardive dyskinesia vs extrapyramidal side effects of meds.  - At that time, recommended to increase Cogentin to 1mg BID and add vitamin E 800 international unit(s) daily.      Hyperlipidemia  - Chronic and stable on low dose ASA and atorvastatin.     COPD  - Not O2 dependent. PTA on salmeterol and albuterol PRN  - occasionally refusing salmeterol  - 6/22 noted to have a cough intermittently over past few days--dry  - add mucinex BID  - encourage inhaler compliance     Hypothyroidism  - Chronic and stable on levothyroxine.     Tobacco use disorder  - Using nicotine patch and PRN gum.     Hx of infective endocarditis with severe mitral and aortic regurgitation S/P bioprosthetic valve replacement (10/2015)  Systolic heart murmur  HFrEF, not in acute exacerbation  Follows with Dr. Dockery of Heart and Vascular Cardiology, last seen in 1/2020. Note heart murmur on exam, strongest in aortic region. No CP, SOB, dizziness, syncope. Echocardiogram in 5/2016 with EF 50%, no evidence for prosthetic mitral and aortic valve dysfunction.   - Follow-up with outpatient Cardiology upon dismissal from the hospital (on every 2 year follow-ups).  - Continue ASA.     Hx of  CVA  - Note basal ganglia stroke in 2015. No focal neuro deficits aside from cognitive dysfunction as above.      Non-severe malnutrition  - Dietitian consulted.    DVT Prophylaxis: Ambulate  Code Status: Full Code  Expected discharge: once placement found.  Currently working towards transferring patient to [facility] unit.    Ashtyn Barrientos MD  Text Page (7am - 6pm)    Interval History   Patient seen and examined. Still with sitter and door alarm. Asks when he can leave.  Appears frustrated.  Just returned to his room after a walk.    -Data reviewed today: I reviewed all new labs and imaging results over the last 24 hours. I personally reviewed no images or EKG's today.    Physical Exam   Temp: 97.7  F (36.5  C) Temp src: Oral BP: 98/65 Pulse: 68   Resp: 16 SpO2: 95 % O2 Device: None (Room air)    Vitals:    12/11/20 0700 03/19/21 2120 03/31/21 1700   Weight: 82.6 kg (182 lb) 84.4 kg (186 lb) 84.2 kg (185 lb 9.6 oz)     Vital Signs with Ranges  Temp:  [97.7  F (36.5  C)] 97.7  F (36.5  C)  Pulse:  [68] 68  Resp:  [16] 16  BP: (98)/(65) 98/65  SpO2:  [95 %] 95 %  I/O last 3 completed shifts:  In: 360 [P.O.:360]  Out: -     Constitutional: Awake, seen ambulating in room, sitter at bedside  Respiratory: Nonlabored breathing  Cardiovascular: RRR  GI: Nondistended  Skin/Integumen: No rashes on exposed skin  Other: Quiet speech  Psych: Somewhat restless and easily agitated but also easily redirectable    Medications       aspirin  81 mg Oral Daily     atorvastatin  80 mg Oral At Bedtime     benztropine  1 mg Oral BID     cholecalciferol  1,250 mcg Oral Q7 Days     divalproex sodium extended-release  500 mg Oral At Bedtime     docusate sodium  100 mg Oral BID     guaiFENesin  600 mg Oral BID     haloperidol  10 mg Oral TID     levothyroxine  88 mcg Oral Daily     memantine  5 mg Oral Daily     risperiDONE  1 mg Sublingual Daily     risperiDONE  3 mg Sublingual BID     salmeterol  1 puff Inhalation BID      vitamin E  800 Units Oral Daily       Data   Recent Labs   Lab 06/18/21  0735   WBC 5.7   HGB 15.3   MCV 94   *      POTASSIUM 3.9   CHLORIDE 111*   CO2 22   BUN 15   CR 0.79   ANIONGAP 8   LESLEE 8.0*   *       No results found for this or any previous visit (from the past 24 hour(s)).

## 2021-06-23 NOTE — TELEPHONE ENCOUNTER
-Reviewed case with Dr. Witlon Morfin.  Dr. Morfin will consult with leadership regarding most appropriate placement.  Dr. Morfin will call intake with recommendations.

## 2021-06-24 ENCOUNTER — TELEPHONE (OUTPATIENT)
Dept: BEHAVIORAL HEALTH | Facility: CLINIC | Age: 56
End: 2021-06-24

## 2021-06-24 PROCEDURE — 120N000001 HC R&B MED SURG/OB

## 2021-06-24 PROCEDURE — 250N000013 HC RX MED GY IP 250 OP 250 PS 637: Performed by: HOSPITALIST

## 2021-06-24 PROCEDURE — 99231 SBSQ HOSP IP/OBS SF/LOW 25: CPT | Performed by: HOSPITALIST

## 2021-06-24 PROCEDURE — 250N000013 HC RX MED GY IP 250 OP 250 PS 637: Performed by: INTERNAL MEDICINE

## 2021-06-24 PROCEDURE — 250N000013 HC RX MED GY IP 250 OP 250 PS 637: Performed by: PSYCHIATRY & NEUROLOGY

## 2021-06-24 RX ADMIN — HALOPERIDOL 10 MG: 5 TABLET ORAL at 09:11

## 2021-06-24 RX ADMIN — BENZTROPINE MESYLATE 1 MG: 0.5 TABLET ORAL at 20:35

## 2021-06-24 RX ADMIN — CHOLECALCIFEROL CAP 1.25 MG (50000 UNIT) 1250 MCG: 1.25 CAP at 13:09

## 2021-06-24 RX ADMIN — DIVALPROEX SODIUM 500 MG: 500 TABLET, FILM COATED, EXTENDED RELEASE ORAL at 20:36

## 2021-06-24 RX ADMIN — DOCUSATE SODIUM 100 MG: 100 CAPSULE, LIQUID FILLED ORAL at 09:11

## 2021-06-24 RX ADMIN — SALMETEROL XINAFOATE 1 PUFF: 50 POWDER, METERED ORAL; RESPIRATORY (INHALATION) at 09:21

## 2021-06-24 RX ADMIN — GUAIFENESIN 600 MG: 600 TABLET, EXTENDED RELEASE ORAL at 09:11

## 2021-06-24 RX ADMIN — Medication 800 UNITS: at 09:11

## 2021-06-24 RX ADMIN — DOCUSATE SODIUM 100 MG: 100 CAPSULE, LIQUID FILLED ORAL at 20:35

## 2021-06-24 RX ADMIN — RISPERIDONE 1 MG: 1 TABLET, ORALLY DISINTEGRATING ORAL at 13:09

## 2021-06-24 RX ADMIN — MEMANTINE 5 MG: 5 TABLET ORAL at 09:11

## 2021-06-24 RX ADMIN — HALOPERIDOL 10 MG: 5 TABLET ORAL at 13:09

## 2021-06-24 RX ADMIN — ATORVASTATIN CALCIUM 80 MG: 40 TABLET, FILM COATED ORAL at 20:35

## 2021-06-24 RX ADMIN — RISPERIDONE 3 MG: 1 TABLET, ORALLY DISINTEGRATING ORAL at 09:11

## 2021-06-24 RX ADMIN — LEVOTHYROXINE SODIUM 88 MCG: 88 TABLET ORAL at 09:11

## 2021-06-24 RX ADMIN — ASPIRIN 81 MG: 81 TABLET, COATED ORAL at 09:11

## 2021-06-24 RX ADMIN — BENZTROPINE MESYLATE 1 MG: 0.5 TABLET ORAL at 09:11

## 2021-06-24 RX ADMIN — GUAIFENESIN 600 MG: 600 TABLET, EXTENDED RELEASE ORAL at 20:35

## 2021-06-24 RX ADMIN — SALMETEROL XINAFOATE 1 PUFF: 50 POWDER, METERED ORAL; RESPIRATORY (INHALATION) at 20:38

## 2021-06-24 RX ADMIN — HALOPERIDOL 10 MG: 5 TABLET ORAL at 20:35

## 2021-06-24 RX ADMIN — RISPERIDONE 3 MG: 1 TABLET, ORALLY DISINTEGRATING ORAL at 20:35

## 2021-06-24 ASSESSMENT — ACTIVITIES OF DAILY LIVING (ADL)
ADLS_ACUITY_SCORE: 14

## 2021-06-24 NOTE — PLAN OF CARE
Alert to self, knows in hospital at times.  Independent in room.  VSS.  Redirected with walks, music, offer of games and food when coming out to desk.  Ambulated in cruz SBA with staff x2.

## 2021-06-24 NOTE — PROGRESS NOTES
[facility]    Hospitalist Progress Note      Assessment & Plan   John Juárez is a 55 year old male with PMHx of schizophrenia, hx of TBI, alcohol use disorder, COPD, hyperlipidemia and hypothyroidism who was admitted on 9/9/2020 for evaluation of aggressive behavior at his group home.   Was evaluated by Psychiatry and his condition stabilized, though his group home was unwilling to take him back and thus hospitalization has been prolonged awaiting new placement and guardianship. Under civil commitment through [facility].  Noted increasingly agitated behavior and attempts at elopement in May 2021.    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  Brought to hospital for evaluation of aggressive behaviors. Group home now unwilling to take him back. Has had numerous CODE 21s called this stay. Seen by psych, meds adjusted. Is currently under civil commitment and court hold through [facility] until 7/31/21.  -Appreciate Psychiatry involvement  -Haldol increased to 10 mg TID on 5/23, because of increasing agitation.  -Benztropine 1mg BID for EPS.  -Valproic acid 500mg at bedtime.  -Door alarm, 1:1 as needed.  -SW following for placement, placement will occur once guardianship is established. The anticipated discharge is to [facility] which is a skilled nursing facility which has a secured male unit for patients with mental health diagnosis.  - 6/8/21 - consulted psychiatry again due to ongoing aggressive behaviors/hallucinations/delusions. See Dr. Murrieta's note from 6/8/21, appreciate his assistance.  - 6/9/21 - psychiatry saw patient again. Increased Risperdal to 3 mg PO BID. Pursuing transfer to inpatient psychiatric unit for ongoing care until guardian is appointed.  - consulted psychiatry for 6/15 and 6/17, consult noted, possible trial for clozaril  added to his Yanez petition.  [place] he is best served in inpatient psychiatry unit and possible long acting injectable for behavior control and people willing to take guardianship role.  - 6/21 Psych evaluated- continue commitment, still awaiting determination for clozaril--> trying to investigate transfer to [facility]. Patient also tried to elope x2, had code 21s.   - 6/22 reconsult psych for med adjustment, most of his behaviors are during day---added scheduled 1mg risperdal in afternoon  -6/23: Another attempt to elope with code 21 called  -6/24: Discussed case with lead psychiatrist at [facility] who stated that at this time patient could not transfer to [facility] given underlying severe cognitive issues.  He will discuss further with rest of the team.    Vitamin D Deficiency  Vit D level 16, 17 in May and June. Has been in hospital since Sept 2020.  - started on cholecalciferol 20658 units on 6/17     Chronic/Stable/Resolved  Possible Tardive Dyskinesia vs EPSE  Per psych assessment on 4/1/21, noted to have possible tardive dyskinesia vs extrapyramidal side effects of meds.  - At that time, recommended to increase Cogentin to 1mg BID and add vitamin E 800 international unit(s) daily.      Hyperlipidemia  - Chronic and stable on low dose ASA and atorvastatin.     COPD  - Not O2 dependent. PTA on salmeterol and albuterol PRN  - occasionally refusing salmeterol  - 6/22 noted to have a cough intermittently over past few days--dry  - add mucinex BID  - encourage inhaler compliance     Hypothyroidism  - Chronic and stable on levothyroxine.     Tobacco use disorder  - Using nicotine patch and PRN gum.     Hx of infective endocarditis with severe mitral and aortic regurgitation S/P bioprosthetic valve replacement (10/2015)  Systolic heart murmur  HFrEF, not in acute exacerbation  Follows with Dr. Dockery of Heart and Vascular Cardiology, last seen in 1/2020. Note heart murmur on exam, strongest in aortic region.  No CP, SOB, dizziness, syncope. Echocardiogram in 5/2016 with EF 50%, no evidence for prosthetic mitral and aortic valve dysfunction.   - Follow-up with outpatient Cardiology upon dismissal from the hospital (on every 2 year follow-ups).  - Continue ASA.     Hx of CVA  - Note basal ganglia stroke in 2015. No focal neuro deficits aside from cognitive dysfunction as above.      Non-severe malnutrition  - Dietitian consulted.    DVT Prophylaxis: Ambulate  Code Status: Full Code  Expected discharge: once placement found.  Discussed case with psychiatrist at [place].  Declined transfer to [place] at this time given underlying severe cognitive disorder which is an exclusion criteria for admission to inpatient psychiatry unit there.    Ashtyn Barrientos MD  Text Page (7am - 6pm)    Interval History   Noted attempt to elope again yesterday.  Code 21 was called.  This morning was laying in his bed, resting comfortably.  No concerns noted today per RN.    -Data reviewed today: I reviewed all new labs and imaging results over the last 24 hours. I personally reviewed no images or EKG's today.    Physical Exam   Temp: 97.6  F (36.4  C) Temp src: Oral BP: 112/77 Pulse: 90   Resp: 18 SpO2: 96 % O2 Device: None (Room air)    Vitals:    12/11/20 0700 03/19/21 2120 03/31/21 1700   Weight: 82.6 kg (182 lb) 84.4 kg (186 lb) 84.2 kg (185 lb 9.6 oz)     Vital Signs with Ranges  Temp:  [97.4  F (36.3  C)-97.6  F (36.4  C)] 97.6  F (36.4  C)  Pulse:  [68-90] 90  Resp:  [16-18] 18  BP: (112-116)/(77-80) 112/77  SpO2:  [95 %-96 %] 96 %  I/O last 3 completed shifts:  In: 360 [P.O.:360]  Out: -     Constitutional: Resting in bed  Respiratory: Nonlabored breathing  Cardiovascular:   GI: Nondistended  Skin/Integumen: No rashes on exposed skin  Other: Quiet speech  Psych: Calm    Medications       aspirin  81 mg Oral Daily     atorvastatin  80 mg Oral At Bedtime     benztropine  1 mg Oral BID     cholecalciferol  1,250 mcg Oral Q7 Days      divalproex sodium extended-release  500 mg Oral At Bedtime     docusate sodium  100 mg Oral BID     guaiFENesin  600 mg Oral BID     haloperidol  10 mg Oral TID     levothyroxine  88 mcg Oral Daily     memantine  5 mg Oral Daily     risperiDONE  1 mg Sublingual Daily     risperiDONE  3 mg Sublingual BID     salmeterol  1 puff Inhalation BID     vitamin E  800 Units Oral Daily       Data   Recent Labs   Lab 06/18/21  0735   WBC 5.7   HGB 15.3   MCV 94   *      POTASSIUM 3.9   CHLORIDE 111*   CO2 22   BUN 15   CR 0.79   ANIONGAP 8   LESLEE 8.0*   *       No results found for this or any previous visit (from the past 24 hour(s)).

## 2021-06-24 NOTE — PLAN OF CARE
DATE & TIME: 6/23/2021 8136-1571  Cognitive Concerns/ Orientation : Alert to self and place, whispers. Flat affect.   BEHAVIOR & AGGRESSION TOOL COLOR: Yellow at times, gets restless and agitated at times.  ABNL VS/O2: VSS on RA  MOBILITY: Independent in room, SBA in hallway- unsteady at times  PAIN MANAGMENT: denies  DIET: Regular. Good appetite.   BOWEL/BLADDER: Continent, up to BR  ABNL LAB/BG: no labs today  DRAIN/DEVICES: No IV access  SKIN: Scattered bruising, scab on L shin  D/C DATE: Discharge pending guardianship and potential inpatient psych placement. SW following  OTHER IMPORTANT INFO: Sitter and door alarm in place. Commitment and court hold through 7/31/21. Has intermittent hallucinations visual/auditory. Will punch fist into hand at times in frustration/anger.  Encouraged ambulation, games, music, TV to keep patient busy.

## 2021-06-25 PROCEDURE — 250N000013 HC RX MED GY IP 250 OP 250 PS 637: Performed by: INTERNAL MEDICINE

## 2021-06-25 PROCEDURE — 250N000013 HC RX MED GY IP 250 OP 250 PS 637: Performed by: PSYCHIATRY & NEUROLOGY

## 2021-06-25 PROCEDURE — 99231 SBSQ HOSP IP/OBS SF/LOW 25: CPT | Performed by: HOSPITALIST

## 2021-06-25 PROCEDURE — 87635 SARS-COV-2 COVID-19 AMP PRB: CPT | Performed by: HOSPITALIST

## 2021-06-25 PROCEDURE — 250N000013 HC RX MED GY IP 250 OP 250 PS 637: Performed by: HOSPITALIST

## 2021-06-25 PROCEDURE — 120N000001 HC R&B MED SURG/OB

## 2021-06-25 RX ADMIN — Medication 800 UNITS: at 08:01

## 2021-06-25 RX ADMIN — HALOPERIDOL 10 MG: 5 TABLET ORAL at 13:03

## 2021-06-25 RX ADMIN — BENZTROPINE MESYLATE 1 MG: 0.5 TABLET ORAL at 08:00

## 2021-06-25 RX ADMIN — RISPERIDONE 1 MG: 1 TABLET, ORALLY DISINTEGRATING ORAL at 19:45

## 2021-06-25 RX ADMIN — BENZTROPINE MESYLATE 1 MG: 0.5 TABLET ORAL at 21:13

## 2021-06-25 RX ADMIN — HALOPERIDOL 10 MG: 5 TABLET ORAL at 08:00

## 2021-06-25 RX ADMIN — MEMANTINE 5 MG: 5 TABLET ORAL at 08:00

## 2021-06-25 RX ADMIN — ATORVASTATIN CALCIUM 80 MG: 40 TABLET, FILM COATED ORAL at 21:14

## 2021-06-25 RX ADMIN — DOCUSATE SODIUM 100 MG: 100 CAPSULE, LIQUID FILLED ORAL at 21:13

## 2021-06-25 RX ADMIN — RISPERIDONE 3 MG: 1 TABLET, ORALLY DISINTEGRATING ORAL at 08:00

## 2021-06-25 RX ADMIN — RISPERIDONE 3 MG: 1 TABLET, ORALLY DISINTEGRATING ORAL at 21:14

## 2021-06-25 RX ADMIN — ASPIRIN 81 MG: 81 TABLET, COATED ORAL at 08:00

## 2021-06-25 RX ADMIN — DIVALPROEX SODIUM 500 MG: 500 TABLET, FILM COATED, EXTENDED RELEASE ORAL at 21:13

## 2021-06-25 RX ADMIN — DOCUSATE SODIUM 100 MG: 100 CAPSULE, LIQUID FILLED ORAL at 08:00

## 2021-06-25 RX ADMIN — SALMETEROL XINAFOATE 1 PUFF: 50 POWDER, METERED ORAL; RESPIRATORY (INHALATION) at 08:03

## 2021-06-25 RX ADMIN — RISPERIDONE 1 MG: 1 TABLET, ORALLY DISINTEGRATING ORAL at 12:12

## 2021-06-25 RX ADMIN — SALMETEROL XINAFOATE 1 PUFF: 50 POWDER, METERED ORAL; RESPIRATORY (INHALATION) at 21:14

## 2021-06-25 RX ADMIN — HALOPERIDOL 10 MG: 5 TABLET ORAL at 21:13

## 2021-06-25 RX ADMIN — GUAIFENESIN 600 MG: 600 TABLET, EXTENDED RELEASE ORAL at 08:00

## 2021-06-25 RX ADMIN — GUAIFENESIN 600 MG: 600 TABLET, EXTENDED RELEASE ORAL at 21:13

## 2021-06-25 RX ADMIN — LEVOTHYROXINE SODIUM 88 MCG: 88 TABLET ORAL at 08:00

## 2021-06-25 ASSESSMENT — ACTIVITIES OF DAILY LIVING (ADL)
ADLS_ACUITY_SCORE: 14
ADLS_ACUITY_SCORE: 11
ADLS_ACUITY_SCORE: 13
ADLS_ACUITY_SCORE: 11
ADLS_ACUITY_SCORE: 13
ADLS_ACUITY_SCORE: 11

## 2021-06-25 NOTE — PLAN OF CARE
Summary: Dementia w/aggression  DATE & TIME: 6/24/2021 3399 - 6673  COGNITIVE CONCERNS/ORIENTATION: Alert to self and sometimes place, whispers. Flat affect.   BEHAVIOR & AGGRESSION TOOL COLOR: Yellow at times, gets restless and agitated at times.  ABNL VS/O2: VSS on RA  MOBILITY: Independent in room, SBA in hallway- unsteady at times  PAIN MANAGMENT: denies  DIET: Regular. Good appetite.   BOWEL/BLADDER: Continent, up to BR  ABNL LAB/BG: none new  DRAIN/DEVICES: No IV access  SKIN: Scattered bruising, scab on L shin  D/C DATE: Discharge pending guardianship and potential inpatient psych placement. Per hospitalist note, [place] declined patient transfer.   OTHER IMPORTANT INFO: Sitter and door alarm in place. Commitment and court hold through 7/31/21. Has intermittent hallucinations visual/auditory. Encouraged ambulation, games, music, TV to keep patient busy. Per DAVINA note, a request to amend Yanez order (remove Zyprexa & add Clozaril) was submitted. DAVINA following, psychiatry re-consulted

## 2021-06-25 NOTE — PROGRESS NOTES
"Pt slightly agitated at 2030, said he wanted to call his sister so she could pick him up.  When patient was told that he couldn't go to his sister's, patient said \"this is bullshit.\" Patient began pacing slightly in his room.  Writer gave patient his scheduled medications, which patient took without any issues.     Patient redirected to watch TV, played games with sitter, asleep by 2230.  Will continue to monitor.  "

## 2021-06-25 NOTE — PLAN OF CARE
Summary: Dementia w/aggression  DATE & TIME: 6/25/2021 0402-8469  COGNITIVE CONCERNS/ORIENTATION: Alert to self and sometimes place, whispers. Flat affect.   BEHAVIOR & AGGRESSION TOOL COLOR: Yellow at times, gets restless and agitated at times.  ABNL VS/O2: VSS on RA  MOBILITY: Independent in room, SBA in hallway- unsteady at times  PAIN MANAGMENT: denies  DIET: Regular. Good appetite.   BOWEL/BLADDER: Continent, up to BR  ABNL LAB/BG: none new  DRAIN/DEVICES: No IV access  SKIN: Scattered bruising, scab on L shin  D/C DATE: Discharge pending guardianship and potential inpatient psych placement. Per hospitalist note, [place] declined patient transfer.   OTHER IMPORTANT INFO: Sitter and door alarm in place. Commitment and court hold through 7/31/21. Has intermittent hallucinations visual/auditory. Encouraged ambulation, games, music, TV to keep patient busy. Per DAVINA note, a request to amend Yanez order (remove Zyprexa & add Clozaril) was submitted. DAVINA following, psychiatry re-consulted

## 2021-06-25 NOTE — PROGRESS NOTES
[facility]    Hospitalist Progress Note      Assessment & Plan   John Juárez is a 55 year old male with PMHx of schizophrenia, hx of TBI, alcohol use disorder, COPD, hyperlipidemia and hypothyroidism who was admitted on 9/9/2020 for evaluation of aggressive behavior at his group home.   Was evaluated by Psychiatry and his condition stabilized, though his group home was unwilling to take him back and thus hospitalization has been prolonged awaiting new placement and guardianship. Under civil commitment through [facility].  Noted increasingly agitated behavior and attempts at elopement in May 2021.    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  Brought to hospital for evaluation of aggressive behaviors. Group home now unwilling to take him back. Has had numerous CODE 21s called this stay. Seen by psych, meds adjusted. Is currently under civil commitment and court hold through [facility] until 7/31/21.  -Appreciate Psychiatry involvement  -Haldol increased to 10 mg TID on 5/23, because of increasing agitation.  -Benztropine 1mg BID for EPS.  -Valproic acid 500mg at bedtime.  -Door alarm, 1:1 as needed.  -SW following for placement, placement will occur once guardianship is established. The anticipated discharge is to [facility] which is a skilled nursing facility which has a secured male unit for patients with mental health diagnosis.  - 6/8/21 - consulted psychiatry again due to ongoing aggressive behaviors/hallucinations/delusions. See Dr. Murrieta's note from 6/8/21, appreciate his assistance.  - 6/9/21 - psychiatry saw patient again. Increased Risperdal to 3 mg PO BID. Pursuing transfer to inpatient psychiatric unit for ongoing care until guardian is appointed.  - consulted psychiatry for 6/15 and 6/17, consult noted, possible trial for clozaril  added to his Yanez petition.  [place] he is best served in inpatient psychiatry unit and possible long acting injectable for behavior control and people willing to take guardianship role.  - 6/21 Psych evaluated- continue commitment, still awaiting determination for clozaril--> trying to investigate transfer to [facility]. Patient also tried to elope x2, had code 21s.   - 6/22 reconsult psych for med adjustment, most of his behaviors are during day---added scheduled 1mg risperdal in afternoon  -6/23: Another attempt to elope with code 21 called  -6/24: Discussed case with lead psychiatrist at [facility] who stated that at this time patient could not transfer to [facility] given underlying severe cognitive issues.  He will discuss further with rest of the team.    Vitamin D Deficiency  Vit D level 16, 17 in May and June. Has been in hospital since Sept 2020.  - started on cholecalciferol 55192 units on 6/17     Chronic/Stable/Resolved  Possible Tardive Dyskinesia vs EPSE  Per psych assessment on 4/1/21, noted to have possible tardive dyskinesia vs extrapyramidal side effects of meds.  - At that time, recommended to increase Cogentin to 1mg BID and add vitamin E 800 international unit(s) daily.      Hyperlipidemia  - Chronic and stable on low dose ASA and atorvastatin.     COPD  - Not O2 dependent. PTA on salmeterol and albuterol PRN  - occasionally refusing salmeterol  - 6/22 noted to have a cough intermittently over past few days--dry  - add mucinex BID  - encourage inhaler compliance     Hypothyroidism  - Chronic and stable on levothyroxine.     Tobacco use disorder  - Using nicotine patch and PRN gum.     Hx of infective endocarditis with severe mitral and aortic regurgitation S/P bioprosthetic valve replacement (10/2015)  Systolic heart murmur  HFrEF, not in acute exacerbation  Follows with Dr. Dockery of Heart and Vascular Cardiology, last seen in 1/2020. Note heart murmur on exam, strongest in aortic region.  No CP, SOB, dizziness, syncope. Echocardiogram in 5/2016 with EF 50%, no evidence for prosthetic mitral and aortic valve dysfunction.   - Follow-up with outpatient Cardiology upon dismissal from the hospital (on every 2 year follow-ups).  - Continue ASA.     Hx of CVA  - Note basal ganglia stroke in 2015. No focal neuro deficits aside from cognitive dysfunction as above.      Non-severe malnutrition  - Dietitian consulted.    DVT Prophylaxis: Ambulate  Code Status: Full Code  Expected discharge: once placement found.  Discussed case with psychiatrist at [facility].  Declined transfer to [place] at this time given underlying severe cognitive disorder which is an exclusion criteria for admission to inpatient psychiatry unit there.    Ashtyn Barrientos MD  Text Page (7am - 6pm)    Interval History   Stable overnight.  This morning was laying in his bed, resting comfortably.  No concerns noted today per RN.    -Data reviewed today: I reviewed all new labs and imaging results over the last 24 hours. I personally reviewed no images or EKG's today.    Physical Exam   Temp: 98  F (36.7  C) Temp src: Oral BP: 114/81 Pulse: 66   Resp: 18 SpO2: 96 % O2 Device: None (Room air)    Vitals:    12/11/20 0700 03/19/21 2120 03/31/21 1700   Weight: 82.6 kg (182 lb) 84.4 kg (186 lb) 84.2 kg (185 lb 9.6 oz)     Vital Signs with Ranges  Temp:  [98  F (36.7  C)] 98  F (36.7  C)  Pulse:  [66] 66  Resp:  [18] 18  BP: (114)/(81) 114/81  SpO2:  [96 %] 96 %  I/O last 3 completed shifts:  In: 360 [P.O.:360]  Out: -     Constitutional: Resting in bed  Respiratory: Nonlabored breathing  Cardiovascular:   GI: Nondistended  Skin/Integumen: No rashes on exposed skin  Other: Quiet speech  Psych: Calm    Medications       aspirin  81 mg Oral Daily     atorvastatin  80 mg Oral At Bedtime     benztropine  1 mg Oral BID     cholecalciferol  1,250 mcg Oral Q7 Days     divalproex sodium extended-release  500 mg Oral At Bedtime     docusate sodium  100 mg  Oral BID     guaiFENesin  600 mg Oral BID     haloperidol  10 mg Oral TID     levothyroxine  88 mcg Oral Daily     memantine  5 mg Oral Daily     risperiDONE  1 mg Sublingual Daily     risperiDONE  3 mg Sublingual BID     salmeterol  1 puff Inhalation BID     vitamin E  800 Units Oral Daily       Data   No lab results found in last 7 days.    No results found for this or any previous visit (from the past 24 hour(s)).

## 2021-06-26 PROCEDURE — 250N000013 HC RX MED GY IP 250 OP 250 PS 637: Performed by: PSYCHIATRY & NEUROLOGY

## 2021-06-26 PROCEDURE — 99231 SBSQ HOSP IP/OBS SF/LOW 25: CPT | Performed by: HOSPITALIST

## 2021-06-26 PROCEDURE — 250N000013 HC RX MED GY IP 250 OP 250 PS 637: Performed by: INTERNAL MEDICINE

## 2021-06-26 PROCEDURE — 120N000001 HC R&B MED SURG/OB

## 2021-06-26 PROCEDURE — 250N000013 HC RX MED GY IP 250 OP 250 PS 637: Performed by: HOSPITALIST

## 2021-06-26 RX ADMIN — RISPERIDONE 1 MG: 1 TABLET, ORALLY DISINTEGRATING ORAL at 12:43

## 2021-06-26 RX ADMIN — RISPERIDONE 3 MG: 1 TABLET, ORALLY DISINTEGRATING ORAL at 08:33

## 2021-06-26 RX ADMIN — RISPERIDONE 1 MG: 1 TABLET, ORALLY DISINTEGRATING ORAL at 16:07

## 2021-06-26 RX ADMIN — SALMETEROL XINAFOATE 1 PUFF: 50 POWDER, METERED ORAL; RESPIRATORY (INHALATION) at 08:33

## 2021-06-26 RX ADMIN — MEMANTINE 5 MG: 5 TABLET ORAL at 08:34

## 2021-06-26 RX ADMIN — BENZTROPINE MESYLATE 1 MG: 0.5 TABLET ORAL at 08:34

## 2021-06-26 RX ADMIN — Medication 800 UNITS: at 08:34

## 2021-06-26 RX ADMIN — LEVOTHYROXINE SODIUM 88 MCG: 88 TABLET ORAL at 08:34

## 2021-06-26 RX ADMIN — BENZTROPINE MESYLATE 1 MG: 0.5 TABLET ORAL at 21:14

## 2021-06-26 RX ADMIN — SALMETEROL XINAFOATE 1 PUFF: 50 POWDER, METERED ORAL; RESPIRATORY (INHALATION) at 21:14

## 2021-06-26 RX ADMIN — HALOPERIDOL 10 MG: 5 TABLET ORAL at 08:34

## 2021-06-26 RX ADMIN — LORAZEPAM 0.5 MG: 0.5 TABLET ORAL at 15:27

## 2021-06-26 RX ADMIN — ATORVASTATIN CALCIUM 80 MG: 40 TABLET, FILM COATED ORAL at 21:14

## 2021-06-26 RX ADMIN — GUAIFENESIN 600 MG: 600 TABLET, EXTENDED RELEASE ORAL at 08:34

## 2021-06-26 RX ADMIN — HALOPERIDOL 10 MG: 5 TABLET ORAL at 14:25

## 2021-06-26 RX ADMIN — ASPIRIN 81 MG: 81 TABLET, COATED ORAL at 08:33

## 2021-06-26 RX ADMIN — HALOPERIDOL 10 MG: 5 TABLET ORAL at 21:14

## 2021-06-26 RX ADMIN — DIVALPROEX SODIUM 500 MG: 500 TABLET, FILM COATED, EXTENDED RELEASE ORAL at 21:14

## 2021-06-26 RX ADMIN — RISPERIDONE 3 MG: 1 TABLET, ORALLY DISINTEGRATING ORAL at 21:14

## 2021-06-26 RX ADMIN — GUAIFENESIN 600 MG: 600 TABLET, EXTENDED RELEASE ORAL at 21:14

## 2021-06-26 RX ADMIN — DOCUSATE SODIUM 100 MG: 100 CAPSULE, LIQUID FILLED ORAL at 21:14

## 2021-06-26 RX ADMIN — DOCUSATE SODIUM 100 MG: 100 CAPSULE, LIQUID FILLED ORAL at 08:34

## 2021-06-26 ASSESSMENT — ACTIVITIES OF DAILY LIVING (ADL)
ADLS_ACUITY_SCORE: 11

## 2021-06-26 NOTE — PLAN OF CARE
"DATE & TIME: 6/25/21,5789-5430           Cognitive Concerns/ Orientation : Alert, confused and irritable at times. Talking to self, getting angry and wanting to talk to the police per attendant, PRN ativan given. Coming out of room and asking for a different room due to \"too many voices\" in his room, PRN Risperdal given. Repeatedly saying he has been told that he is supposed to be \"leaving here today!\"  BEHAVIOR & AGGRESSION TOOL COLOR: Green     ABNL VS/O2: Daily VS and daily assessment  MOBILITY: Independent in room, SBA in hallway  PAIN MANAGMENT: Non verbal indicators absence  DIET: Regular  BOWEL/BLADDER: Continent  ABNL LAB/BG: NA  DRAIN/DEVICES: No IV access  SKIN: intact   TESTS/PROCEDURES: NA  D/C DATE: Discharge pending guardianship and potential inpatient psych placement  OTHER IMPORTANT INFO: Door alarm in place. Commitment and court hold through 7/31/2.     "

## 2021-06-26 NOTE — PLAN OF CARE
DATE & TIME: 6/25/21 1900-2300    Cognitive Concerns/ Orientation : Pt A/Ox1, oriented to self, whispers. Flat affect.   BEHAVIOR & AGGRESSION TOOL COLOR: Yellow, agitated at times. Punching hand into fist.   ABNL VS/O2: VSS on RA  MOBILITY: Independent in room. SBA in hallway.  PAIN MANAGMENT: Denies  DIET: Regular  BOWEL/BLADDER: Continent  DRAIN/DEVICES: No IV access  SKIN: Scattered bruising  D/C DATE: Discharge pending guardianship and potential inpatient psych placement.  OTHER IMPORTANT INFO: Sitter and door alarm in place. Commitment and court hold through 7/31/21. Pt had intermittent visual and auditory hallucinations this evening. Pt received PRN Risperdal this evening after he began punching fist into hand, yelling at hallucinations and slamming doors.

## 2021-06-26 NOTE — PROGRESS NOTES
[facility]    Hospitalist Progress Note      Assessment & Plan   John Juárez is a 55 year old male with PMHx of schizophrenia, hx of TBI, alcohol use disorder, COPD, hyperlipidemia and hypothyroidism who was admitted on 9/9/2020 for evaluation of aggressive behavior at his group home.   Was evaluated by Psychiatry and his condition stabilized, though his group home was unwilling to take him back and thus hospitalization has been prolonged awaiting new placement and guardianship. Under civil commitment through [facility].  Noted increasingly agitated behavior and attempts at elopement in May 2021.    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  Brought to hospital for evaluation of aggressive behaviors. Group home now unwilling to take him back. Has had numerous CODE 21s called this stay. Seen by psych, meds adjusted. Is currently under civil commitment and court hold through [facility] until 7/31/21.  -Appreciate Psychiatry involvement  -Haldol increased to 10 mg TID on 5/23, because of increasing agitation.  -Benztropine 1mg BID for EPS.  -Valproic acid 500mg at bedtime.  -Door alarm, 1:1 as needed.  -SW following for placement, placement will occur once guardianship is established. The anticipated discharge is to [facility] which is a skilled nursing facility which has a secured male unit for patients with mental health diagnosis.  - 6/8/21 - consulted psychiatry again due to ongoing aggressive behaviors/hallucinations/delusions. See Dr. Murrieta's note from 6/8/21, appreciate his assistance.  - 6/9/21 - psychiatry saw patient again. Increased Risperdal to 3 mg PO BID. Pursuing transfer to inpatient psychiatric unit for ongoing care until guardian is appointed.  - consulted psychiatry for 6/15 and 6/17, consult noted, possible trial for clozaril  added to his [facility] petition.  [place] he is best served in inpatient psychiatry unit and possible long acting injectable for behavior control and people willing to take guardianship role.  - 6/21 Psych evaluated- continue commitment, still awaiting determination for clozaril--> trying to investigate transfer to [facility]. Patient also tried to elope x2, had code 21s.   - 6/22 reconsult psych for med adjustment, most of his behaviors are during day---added scheduled 1mg risperdal in afternoon  -6/23: Another attempt to elope with code 21 called  -6/24: Discussed case with lead psychiatrist at [facility] who stated that at this time patient could not transfer to [facility] given underlying severe cognitive issues.  He will discuss further with rest of the team.    Vitamin D Deficiency  Vit D level 16, 17 in May and June. Has been in hospital since Sept 2020.  - started on cholecalciferol 91389 units on 6/17     Chronic/Stable/Resolved  Possible Tardive Dyskinesia vs EPSE  Per psych assessment on 4/1/21, noted to have possible tardive dyskinesia vs extrapyramidal side effects of meds.  - At that time, recommended to increase Cogentin to 1mg BID and add vitamin E 800 international unit(s) daily.      Hyperlipidemia  - Chronic and stable on low dose ASA and atorvastatin.     COPD  - Not O2 dependent. PTA on salmeterol and albuterol PRN  - occasionally refusing salmeterol  - 6/22 noted to have a cough intermittently over past few days--dry  - add mucinex BID  - encourage inhaler compliance     Hypothyroidism  - Chronic and stable on levothyroxine.     Tobacco use disorder  - Using nicotine patch and PRN gum.     Hx of infective endocarditis with severe mitral and aortic regurgitation S/P bioprosthetic valve replacement (10/2015)  Systolic heart murmur  HFrEF, not in acute exacerbation  Follows with Dr. Dockery of Heart and Vascular Cardiology, last seen in 1/2020. Note heart murmur on exam, strongest in aortic region.  No CP, SOB, dizziness, syncope. Echocardiogram in 5/2016 with EF 50%, no evidence for prosthetic mitral and aortic valve dysfunction.   - Follow-up with outpatient Cardiology upon dismissal from the hospital (on every 2 year follow-ups).  - Continue ASA.     Hx of CVA  - Note basal ganglia stroke in 2015. No focal neuro deficits aside from cognitive dysfunction as above.      Non-severe malnutrition  - Dietitian consulted.    DVT Prophylaxis: Ambulate  Code Status: Full Code  Expected discharge: once placement found.  Discussed case with psychiatrist at [facility].  Declined transfer to [facility] at this time given underlying severe cognitive disorder which is an exclusion criteria for admission to inpatient psychiatry unit there.    Ashtyn Barrientos MD  Text Page (7am - 6pm)    Interval History   Stable overnight.  This morning patient was seen ambulating in his room.  Reportedly, last night had some visual hallucinations.  However did not sleep through the night later.    -Data reviewed today: I reviewed all new labs and imaging results over the last 24 hours. I personally reviewed no images or EKG's today.    Physical Exam   Temp: 97.9  F (36.6  C) Temp src: Oral BP: 101/68 Pulse: 83   Resp: 18 SpO2: 94 % O2 Device: None (Room air)    Vitals:    12/11/20 0700 03/19/21 2120 03/31/21 1700   Weight: 82.6 kg (182 lb) 84.4 kg (186 lb) 84.2 kg (185 lb 9.6 oz)     Vital Signs with Ranges  Temp:  [97.9  F (36.6  C)] 97.9  F (36.6  C)  Pulse:  [83] 83  Resp:  [18] 18  BP: (101)/(68) 101/68  SpO2:  [94 %] 94 %  I/O last 3 completed shifts:  In: 1089 [P.O.:1089]  Out: -     Constitutional: Resting in bed  Respiratory: Nonlabored breathing  Cardiovascular:   GI: Nondistended  Skin/Integumen: No rashes on exposed skin  Other: Quiet speech  Psych: Calm    Medications       aspirin  81 mg Oral Daily     atorvastatin  80 mg Oral At Bedtime     benztropine  1 mg Oral BID     cholecalciferol  1,250 mcg Oral Q7 Days     divalproex  sodium extended-release  500 mg Oral At Bedtime     docusate sodium  100 mg Oral BID     guaiFENesin  600 mg Oral BID     haloperidol  10 mg Oral TID     levothyroxine  88 mcg Oral Daily     memantine  5 mg Oral Daily     risperiDONE  1 mg Sublingual Daily     risperiDONE  3 mg Sublingual BID     salmeterol  1 puff Inhalation BID     vitamin E  800 Units Oral Daily       Data   No lab results found in last 7 days.    No results found for this or any previous visit (from the past 24 hour(s)).

## 2021-06-26 NOTE — PLAN OF CARE
DATE & TIME: 6/25/21, 2300 - 0730   Cognitive Concerns/ Orientation : WERO, slept all night   BEHAVIOR & AGGRESSION TOOL COLOR: Green   ABNL VS/O2: Daily VS  MOBILITY: Independent in room, SBA in hallway  PAIN MANAGMENT: Non verbal indicators absence  DIET: Regular  BOWEL/BLADDER: Continent  ABNL LAB/BG: NA  DRAIN/DEVICES: No IV access  SKIN: Skin assessment not done. Patient slept all night  TESTS/PROCEDURES: NA  D/C DATE: Discharge pending guardianship and potential inpatient psych placement  OTHER IMPORTANT INFO: Door alarm in place. Commitment and court hold through 7/31/2. Patient slept all of the night.

## 2021-06-27 PROCEDURE — 250N000013 HC RX MED GY IP 250 OP 250 PS 637: Performed by: HOSPITALIST

## 2021-06-27 PROCEDURE — 120N000001 HC R&B MED SURG/OB

## 2021-06-27 PROCEDURE — 250N000011 HC RX IP 250 OP 636: Performed by: INTERNAL MEDICINE

## 2021-06-27 PROCEDURE — 250N000013 HC RX MED GY IP 250 OP 250 PS 637: Performed by: PSYCHIATRY & NEUROLOGY

## 2021-06-27 PROCEDURE — 250N000013 HC RX MED GY IP 250 OP 250 PS 637: Performed by: INTERNAL MEDICINE

## 2021-06-27 PROCEDURE — 99231 SBSQ HOSP IP/OBS SF/LOW 25: CPT | Performed by: HOSPITALIST

## 2021-06-27 PROCEDURE — 99207 PR NO CHARGE LOS: CPT | Performed by: NURSE PRACTITIONER

## 2021-06-27 RX ADMIN — LORAZEPAM 1 MG: 0.5 TABLET ORAL at 14:18

## 2021-06-27 RX ADMIN — GUAIFENESIN 600 MG: 600 TABLET, EXTENDED RELEASE ORAL at 08:34

## 2021-06-27 RX ADMIN — HALOPERIDOL 10 MG: 5 TABLET ORAL at 13:14

## 2021-06-27 RX ADMIN — SALMETEROL XINAFOATE 1 PUFF: 50 POWDER, METERED ORAL; RESPIRATORY (INHALATION) at 08:37

## 2021-06-27 RX ADMIN — DIVALPROEX SODIUM 500 MG: 500 TABLET, FILM COATED, EXTENDED RELEASE ORAL at 20:29

## 2021-06-27 RX ADMIN — RISPERIDONE 1 MG: 1 TABLET, ORALLY DISINTEGRATING ORAL at 12:00

## 2021-06-27 RX ADMIN — MEMANTINE 5 MG: 5 TABLET ORAL at 08:34

## 2021-06-27 RX ADMIN — BENZTROPINE MESYLATE 1 MG: 0.5 TABLET ORAL at 20:29

## 2021-06-27 RX ADMIN — HALOPERIDOL LACTATE 10 MG: 5 INJECTION, SOLUTION INTRAMUSCULAR at 17:05

## 2021-06-27 RX ADMIN — DOCUSATE SODIUM 100 MG: 100 CAPSULE, LIQUID FILLED ORAL at 20:30

## 2021-06-27 RX ADMIN — SALMETEROL XINAFOATE 1 PUFF: 50 POWDER, METERED ORAL; RESPIRATORY (INHALATION) at 20:32

## 2021-06-27 RX ADMIN — Medication 800 UNITS: at 08:33

## 2021-06-27 RX ADMIN — RISPERIDONE 3 MG: 1 TABLET, ORALLY DISINTEGRATING ORAL at 08:34

## 2021-06-27 RX ADMIN — BENZTROPINE MESYLATE 1 MG: 0.5 TABLET ORAL at 08:34

## 2021-06-27 RX ADMIN — GUAIFENESIN 600 MG: 600 TABLET, EXTENDED RELEASE ORAL at 20:30

## 2021-06-27 RX ADMIN — ASPIRIN 81 MG: 81 TABLET, COATED ORAL at 08:34

## 2021-06-27 RX ADMIN — RISPERIDONE 1 MG: 1 TABLET, ORALLY DISINTEGRATING ORAL at 16:47

## 2021-06-27 RX ADMIN — DOCUSATE SODIUM 100 MG: 100 CAPSULE, LIQUID FILLED ORAL at 08:34

## 2021-06-27 RX ADMIN — ATORVASTATIN CALCIUM 80 MG: 40 TABLET, FILM COATED ORAL at 20:29

## 2021-06-27 RX ADMIN — RISPERIDONE 3 MG: 1 TABLET, ORALLY DISINTEGRATING ORAL at 20:28

## 2021-06-27 RX ADMIN — LEVOTHYROXINE SODIUM 88 MCG: 88 TABLET ORAL at 08:34

## 2021-06-27 RX ADMIN — HALOPERIDOL 10 MG: 5 TABLET ORAL at 20:29

## 2021-06-27 RX ADMIN — HALOPERIDOL 10 MG: 5 TABLET ORAL at 08:34

## 2021-06-27 ASSESSMENT — ACTIVITIES OF DAILY LIVING (ADL)
ADLS_ACUITY_SCORE: 11

## 2021-06-27 NOTE — PROGRESS NOTES
"1314- Writer pullled 10mg scheduled Haldol, entered room and pt was with sitter. He was trying to call his sister but the phone call was not going through. He got mad and accused his brother in law of changing the numbers on him. He then started punching his hand in the air calling her a \"stupuid Bitch\". Writer asked pt if he wanted his Haldol to help calm him down and he took the med's and laied back down. Writer contemplated Pulling ativan but decided to see how Scheduled Haldol worked.     Pt was out for a walk with sitter around 1415 and they got by the [facility] stairway and he made a break for the door and started running down the stairs. Cheyenneter reported he just said \"I want to get out of here\". They made it to door 6 where he was met by security. Sitter & Security walked patient back to room.     Writer then pulled 1mg Ativan for patient after he was back in his room. He agreed to take the meds and I asked him if there was anything else I could do for him and he said to \"Get a hold of his sister\".     1445- Sitter and Pt were playing cards in patients room, he seemed content.     1540- Patient asked to talk to the police and security was called to come and talk with him for a bit.   "

## 2021-06-27 NOTE — CODE/RAPID RESPONSE
Brief House Officer Note:  Mr. Juárez attempted to leave the floor, Code 21 called. Mr. Juárez had returned to his room. Nursing and Security assisted with JOSIANE ALLEN.     MAIA Guadalupe, CNP  Hospitalist Service, House Officer  [facility]     Text Page  Pager: [phone]

## 2021-06-27 NOTE — PROGRESS NOTES
[facility]    Hospitalist Progress Note      Assessment & Plan   John Juárez is a 55 year old male with PMHx of schizophrenia, hx of TBI, alcohol use disorder, COPD, hyperlipidemia and hypothyroidism who was admitted on 9/9/2020 for evaluation of aggressive behavior at his group home.   Was evaluated by Psychiatry and his condition stabilized, though his group home was unwilling to take him back and thus hospitalization has been prolonged awaiting new placement and guardianship. Under civil commitment through [facility].  Noted increasingly agitated behavior and attempts at elopement in May 2021.    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  Brought to hospital for evaluation of aggressive behaviors. Group home now unwilling to take him back. Has had numerous CODE 21s called this stay. Seen by psych, meds adjusted. Is currently under civil commitment and court hold through [facility] until 7/31/21.  -Appreciate Psychiatry involvement  -Haldol increased to 10 mg TID on 5/23, because of increasing agitation.  -Benztropine 1mg BID for EPS.  -Valproic acid 500mg at bedtime.  -Door alarm, 1:1 as needed.  -SW following for placement, placement will occur once guardianship is established. The anticipated discharge is to [facility] which is a skilled nursing facility which has a secured male unit for patients with mental health diagnosis.  - 6/8/21 - consulted psychiatry again due to ongoing aggressive behaviors/hallucinations/delusions. See Dr. Murrieta's note from 6/8/21, appreciate his assistance.  - 6/9/21 - psychiatry saw patient again. Increased Risperdal to 3 mg PO BID. Pursuing transfer to inpatient psychiatric unit for ongoing care until guardian is appointed.  - consulted psychiatry for 6/15 and 6/17, consult noted, possible trial for clozaril  added to his [facility] petition.  [place] he is best served in inpatient psychiatry unit and possible long acting injectable for behavior control and people willing to take guardianship role.  - 6/21 Psych evaluated- continue commitment, still awaiting determination for clozaril--> trying to investigate transfer to [facility]. Patient also tried to elope x2, had code 21s.   - 6/22 reconsult psych for med adjustment, most of his behaviors are during day---added scheduled 1mg risperdal in afternoon  -6/23: Another attempt to elope with code 21 called  -6/24: Discussed case with lead psychiatrist at [facility] who stated that at this time patient could not transfer to [facility] given underlying severe cognitive issues.  He will discuss further with rest of the team.    Vitamin D Deficiency  Vit D level 16, 17 in May and June. Has been in hospital since Sept 2020.  - started on cholecalciferol 69317 units on 6/17     Chronic/Stable/Resolved  Possible Tardive Dyskinesia vs EPSE  Per psych assessment on 4/1/21, noted to have possible tardive dyskinesia vs extrapyramidal side effects of meds.  - At that time, recommended to increase Cogentin to 1mg BID and add vitamin E 800 international unit(s) daily.      Hyperlipidemia  - Chronic and stable on low dose ASA and atorvastatin.     COPD  - Not O2 dependent. PTA on salmeterol and albuterol PRN  - occasionally refusing salmeterol  - 6/22 noted to have a cough intermittently over past few days--dry  - add mucinex BID  - encourage inhaler compliance     Hypothyroidism  - Chronic and stable on levothyroxine.     Tobacco use disorder  - Using nicotine patch and PRN gum.     Hx of infective endocarditis with severe mitral and aortic regurgitation S/P bioprosthetic valve replacement (10/2015)  Systolic heart murmur  HFrEF, not in acute exacerbation  Follows with Dr. Dockery of Heart and Vascular Cardiology, last seen in 1/2020. Note heart murmur on exam, strongest in aortic region.  No CP, SOB, dizziness, syncope. Echocardiogram in 5/2016 with EF 50%, no evidence for prosthetic mitral and aortic valve dysfunction.   - Follow-up with outpatient Cardiology upon dismissal from the hospital (on every 2 year follow-ups).  - Continue ASA.     Hx of CVA  - Note basal ganglia stroke in 2015. No focal neuro deficits aside from cognitive dysfunction as above.      Non-severe malnutrition  - Dietitian consulted.    DVT Prophylaxis: Ambulate  Code Status: Full Code  Expected discharge: once placement found.  Discussed case with psychiatrist at [facility].  Declined transfer to [facility] at this time given underlying severe cognitive disorder which is an exclusion criteria for admission to inpatient psychiatry unit there.    Ashtyn Barrientos MD  Text Page (7am - 6pm)    Interval History   Stable overnight.  This morning patient was seen ambulating in his room.  This afternoon again eloped from the floor and went to another floor and needed to be escorted back to his room with security.      -Data reviewed today: I reviewed all new labs and imaging results over the last 24 hours. I personally reviewed no images or EKG's today.    Physical Exam   Temp: 97.4  F (36.3  C) Temp src: Oral BP: 106/74 Pulse: 83   Resp: 16 SpO2: 97 % O2 Device: None (Room air)    Vitals:    12/11/20 0700 03/19/21 2120 03/31/21 1700   Weight: 82.6 kg (182 lb) 84.4 kg (186 lb) 84.2 kg (185 lb 9.6 oz)     Vital Signs with Ranges  Temp:  [97.4  F (36.3  C)] 97.4  F (36.3  C)  Pulse:  [83] 83  Resp:  [16] 16  BP: (106)/(74) 106/74  SpO2:  [97 %] 97 %  I/O last 3 completed shifts:  In: 600 [P.O.:600]  Out: -     Constitutional: Alert and ambulating  Respiratory: Nonlabored breathing  Cardiovascular:   GI: Nondistended  Skin/Integumen: No rashes on exposed skin  Other: Quiet speech  Psych: Calm    Medications       aspirin  81 mg Oral Daily     atorvastatin  80 mg Oral At Bedtime     benztropine  1 mg Oral BID     cholecalciferol  1,250 mcg  Oral Q7 Days     divalproex sodium extended-release  500 mg Oral At Bedtime     docusate sodium  100 mg Oral BID     guaiFENesin  600 mg Oral BID     haloperidol  10 mg Oral TID     levothyroxine  88 mcg Oral Daily     memantine  5 mg Oral Daily     risperiDONE  1 mg Sublingual Daily     risperiDONE  3 mg Sublingual BID     salmeterol  1 puff Inhalation BID     vitamin E  800 Units Oral Daily       Data   No lab results found in last 7 days.    No results found for this or any previous visit (from the past 24 hour(s)).

## 2021-06-27 NOTE — CODE/RAPID RESPONSE
Brief House Officer Note:  Mr. Juárez left the floor via stairwell. He was escorted back to his room by Security and Mental Health Associate.     MAIA Guadalupe, CNP  Hospitalist Service, House Officer  [facility]     Text Page  Pager: [phone]

## 2021-06-27 NOTE — PLAN OF CARE
DATE & TIME: 6/26/21 5222-5870    Cognitive Concerns/ Orientation : Pt A/Ox1, oriented to self. Pt calm this evening. Ambulating in hallway with staff. Laying in bed in room. Pt came out a couple times asking for clothes to leave and then going back into room.   BEHAVIOR & AGGRESSION TOOL COLOR: Hx of yellow   ABNL VS/O2: Daily VS and assessment  MOBILITY: Independent in room, SBA in hallway  PAIN MANAGMENT: Denies  DIET: Regular  BOWEL/BLADDER: Continent  SKIN: Intact  D/C DATE: Discharge pending guardianship and potential inpatient psych placement  OTHER IMPORTANT INFO: Door alarm in place. Sitter at bedside.

## 2021-06-27 NOTE — PLAN OF CARE
DATE & TIME: 6/26/21, 2330 - 1989   Cognitive Concerns/ Orientation : WERO, asleep   BEHAVIOR & AGGRESSION TOOL COLOR: Green. Hx of yellow   ABNL VS/O2: Daily VS and assessment  MOBILITY: Independent in room, SBA in hallway  PAIN MANAGMENT: Asleep, absence of non verbal indicators  DIET: Regular  BOWEL/BLADDER: Continent  ABNL LAB/BG: NA  DRAIN/DEVICES: NA  SKIN: Not assessed, asleep  D/C DATE: Discharge pending guardianship and potential inpatient psych placement.  OTHER IMPORTANT INFO: Sitter available. Door alarm in place.

## 2021-06-27 NOTE — CODE/RAPID RESPONSE
"1647- Pt came out of room and said he wanted to talk to the police, Security was called to come up. While waiting for security Pt walked out of room and was met by sitter and just kept walking. Sitter and writer followed patient [address]. Sitter was next to door side writer followed behind. Patient side stepped and reached for the door. Sitter and writer grabbed the handle and held door shut. Pt started yelling and writer got patient to let go and walk away from door. Pt said \"he wanted to get out of here\" \"we were going to loose our jobs\" and \"im going to break your fucking legs\". Pt was led back to his room on the way he stopped at the desk and tried calling someone on phone and stated pounding on desk. Code 21 was called. Writer pulled 10mg PRN IM haldol. Pt went back into room calmly, Another CNA on floor got him a slice of pie, and he sat by window. Writer waited for security to give meds at 1705.  "

## 2021-06-27 NOTE — PLAN OF CARE
DATE & TIME: 6/27/21, 2296-2778       Cognitive Concerns/ Orientation : AO to self  BEHAVIOR & AGGRESSION TOOL COLOR: Yellow Today 6/27/21    ABNL VS/O2: Daily VS and assessment  MOBILITY: Independent in room, SBA in hallway  PAIN MANAGMENT: Denies Pain  DIET: Regular  BOWEL/BLADDER: Continent  ABNL LAB/BG: NA  DRAIN/DEVICES: NA  SKIN: WDL  D/C DATE: Discharge pending guardianship and potential inpatient psych placement.  OTHER IMPORTANT INFO: Sitter available. Door alarm in place. Two code 21 called. Pt went down stairs with sitter, tries to elope again with sitter and RN and pt was not let down stairs see notes.

## 2021-06-28 PROCEDURE — 250N000013 HC RX MED GY IP 250 OP 250 PS 637: Performed by: INTERNAL MEDICINE

## 2021-06-28 PROCEDURE — 250N000013 HC RX MED GY IP 250 OP 250 PS 637: Performed by: PSYCHIATRY & NEUROLOGY

## 2021-06-28 PROCEDURE — 250N000013 HC RX MED GY IP 250 OP 250 PS 637: Performed by: HOSPITALIST

## 2021-06-28 PROCEDURE — 120N000001 HC R&B MED SURG/OB

## 2021-06-28 PROCEDURE — 99231 SBSQ HOSP IP/OBS SF/LOW 25: CPT | Performed by: HOSPITALIST

## 2021-06-28 RX ADMIN — LEVOTHYROXINE SODIUM 88 MCG: 88 TABLET ORAL at 07:58

## 2021-06-28 RX ADMIN — GUAIFENESIN 600 MG: 600 TABLET, EXTENDED RELEASE ORAL at 07:58

## 2021-06-28 RX ADMIN — DOCUSATE SODIUM 100 MG: 100 CAPSULE, LIQUID FILLED ORAL at 20:41

## 2021-06-28 RX ADMIN — HALOPERIDOL 10 MG: 5 TABLET ORAL at 13:13

## 2021-06-28 RX ADMIN — Medication 800 UNITS: at 07:58

## 2021-06-28 RX ADMIN — RISPERIDONE 1 MG: 1 TABLET, ORALLY DISINTEGRATING ORAL at 12:33

## 2021-06-28 RX ADMIN — BENZTROPINE MESYLATE 1 MG: 0.5 TABLET ORAL at 20:41

## 2021-06-28 RX ADMIN — DOCUSATE SODIUM 100 MG: 100 CAPSULE, LIQUID FILLED ORAL at 07:58

## 2021-06-28 RX ADMIN — DIVALPROEX SODIUM 500 MG: 500 TABLET, FILM COATED, EXTENDED RELEASE ORAL at 20:41

## 2021-06-28 RX ADMIN — ASPIRIN 81 MG: 81 TABLET, COATED ORAL at 07:58

## 2021-06-28 RX ADMIN — RISPERIDONE 3 MG: 1 TABLET, ORALLY DISINTEGRATING ORAL at 07:58

## 2021-06-28 RX ADMIN — GUAIFENESIN 600 MG: 600 TABLET, EXTENDED RELEASE ORAL at 20:41

## 2021-06-28 RX ADMIN — MEMANTINE 5 MG: 5 TABLET ORAL at 07:59

## 2021-06-28 RX ADMIN — BENZTROPINE MESYLATE 1 MG: 0.5 TABLET ORAL at 07:59

## 2021-06-28 RX ADMIN — RISPERIDONE 3 MG: 1 TABLET, ORALLY DISINTEGRATING ORAL at 20:41

## 2021-06-28 RX ADMIN — ATORVASTATIN CALCIUM 80 MG: 40 TABLET, FILM COATED ORAL at 20:41

## 2021-06-28 RX ADMIN — HALOPERIDOL 10 MG: 5 TABLET ORAL at 20:41

## 2021-06-28 RX ADMIN — SALMETEROL XINAFOATE 1 PUFF: 50 POWDER, METERED ORAL; RESPIRATORY (INHALATION) at 20:41

## 2021-06-28 RX ADMIN — HALOPERIDOL 10 MG: 5 TABLET ORAL at 07:58

## 2021-06-28 ASSESSMENT — ACTIVITIES OF DAILY LIVING (ADL)
ADLS_ACUITY_SCORE: 11

## 2021-06-28 NOTE — PROGRESS NOTES
[facility]    Hospitalist Progress Note      Assessment & Plan   John Juárez is a 55 year old male with PMHx of schizophrenia, hx of TBI, alcohol use disorder, COPD, hyperlipidemia and hypothyroidism who was admitted on 9/9/2020 for evaluation of aggressive behavior at his group home.   Was evaluated by Psychiatry and his condition stabilized, though his group home was unwilling to take him back and thus hospitalization has been prolonged awaiting new placement and guardianship. Under civil commitment through [facility].  Noted increasingly agitated behavior and attempts at elopement in May 2021.    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  Brought to hospital for evaluation of aggressive behaviors. Group home now unwilling to take him back. Has had numerous CODE 21s called this stay. Seen by psych, meds adjusted. Is currently under civil commitment and court hold through [facility] until 7/31/21.  -Appreciate Psychiatry involvement  -Haldol increased to 10 mg TID on 5/23, because of increasing agitation.  -Benztropine 1mg BID for EPS.  -Valproic acid 500mg at bedtime.  -Door alarm, 1:1 as needed.  -SW following for placement, placement will occur once guardianship is established. The anticipated discharge is to [facility] which is a skilled nursing facility which has a secured male unit for patients with mental health diagnosis.  - 6/8/21 - consulted psychiatry again due to ongoing aggressive behaviors/hallucinations/delusions. See Dr. Murrieta's note from 6/8/21, appreciate his assistance.  - 6/9/21 - psychiatry saw patient again. Increased Risperdal to 3 mg PO BID. Pursuing transfer to inpatient psychiatric unit for ongoing care until guardian is appointed.  - consulted psychiatry for 6/15 and 6/17, consult noted, possible trial for clozaril  added to his [facility] petition.  [place] is best served in inpatient psychiatry unit and possible long acting injectable for behavior control and people willing to take guardianship role.  - 6/21 Psych evaluated- continue commitment, still awaiting determination for clozaril--> trying to investigate transfer to [facility]. Patient also tried to elope x2, had code 21s.   - 6/22 reconsult psych for med adjustment, most of his behaviors are during day---added scheduled 1mg risperdal in afternoon  -6/23: Another attempt to elope with code 21 called  -6/24: Discussed case with lead psychiatrist at [place] who stated that at this time patient could not transfer to [place] given underlying severe cognitive issues.  He will discuss further with rest of the team.    Vitamin D Deficiency  Vit D level 16, 17 in May and June. Has been in hospital since Sept 2020.  - started on cholecalciferol 48977 units on 6/17     Chronic/Stable/Resolved  Possible Tardive Dyskinesia vs EPSE  Per psych assessment on 4/1/21, noted to have possible tardive dyskinesia vs extrapyramidal side effects of meds.  - At that time, recommended to increase Cogentin to 1mg BID and add vitamin E 800 international unit(s) daily.      Hyperlipidemia  - Chronic and stable on low dose ASA and atorvastatin.     COPD  - Not O2 dependent. PTA on salmeterol and albuterol PRN  - occasionally refusing salmeterol  - 6/22 noted to have a cough intermittently over past few days--dry  - add mucinex BID  - encourage inhaler compliance     Hypothyroidism  - Chronic and stable on levothyroxine.     Tobacco use disorder  - Using nicotine patch and PRN gum.     Hx of infective endocarditis with severe mitral and aortic regurgitation S/P bioprosthetic valve replacement (10/2015)  Systolic heart murmur  HFrEF, not in acute exacerbation  Follows with Dr. Dockery of Heart and Vascular Cardiology, last seen in 1/2020. Note heart murmur on exam, strongest in aortic region.  No CP, SOB, dizziness, syncope. Echocardiogram in 5/2016 with EF 50%, no evidence for prosthetic mitral and aortic valve dysfunction.   - Follow-up with outpatient Cardiology upon dismissal from the hospital (on every 2 year follow-ups).  - Continue ASA.     Hx of CVA  - Note basal ganglia stroke in 2015. No focal neuro deficits aside from cognitive dysfunction as above.      Non-severe malnutrition  - Dietitian consulted.    DVT Prophylaxis: Ambulate  Code Status: Full Code  Expected discharge: once placement found.  Discussed case with psychiatrist at [place].  Declined transfer to [place] at this time given underlying severe cognitive disorder which is an exclusion criteria for admission to inpatient psychiatry unit there.    Ashtyn Barrientos MD  Text Page (7am - 6pm)    Interval History   Stable overnight.  This morning patient is seen resting comfortably in bed.  No concerns per RN.    -Data reviewed today: I reviewed all new labs and imaging results over the last 24 hours. I personally reviewed no images or EKG's today.    Physical Exam   Temp: 97.2  F (36.2  C) Temp src: Oral BP: 116/76 Pulse: 90   Resp: 16 SpO2: 98 % O2 Device: None (Room air)    Vitals:    12/11/20 0700 03/19/21 2120 03/31/21 1700   Weight: 82.6 kg (182 lb) 84.4 kg (186 lb) 84.2 kg (185 lb 9.6 oz)     Vital Signs with Ranges  Temp:  [97.2  F (36.2  C)] 97.2  F (36.2  C)  Pulse:  [90] 90  Resp:  [16] 16  BP: (116)/(76) 116/76  SpO2:  [98 %] 98 %  I/O last 3 completed shifts:  In: 2010 [P.O.:2010]  Out: -     Constitutional: Resting in bed  Respiratory: Nonlabored breathing  Cardiovascular:   Psych: Calm    Medications       aspirin  81 mg Oral Daily     atorvastatin  80 mg Oral At Bedtime     benztropine  1 mg Oral BID     cholecalciferol  1,250 mcg Oral Q7 Days     divalproex sodium extended-release  500 mg Oral At Bedtime     docusate sodium  100 mg Oral BID     guaiFENesin  600 mg Oral BID     haloperidol  10 mg Oral TID      levothyroxine  88 mcg Oral Daily     memantine  5 mg Oral Daily     risperiDONE  1 mg Sublingual Daily     risperiDONE  3 mg Sublingual BID     salmeterol  1 puff Inhalation BID     vitamin E  800 Units Oral Daily       Data   No lab results found in last 7 days.    No results found for this or any previous visit (from the past 24 hour(s)).

## 2021-06-28 NOTE — PLAN OF CARE
DATE & TIME: 6/27/21 5156-6109   Cognitive Concerns/ Orientation : A&O to self, sleeping throughout the night. Came out to hallway x1 looking for the bathroom.  BEHAVIOR & AGGRESSION TOOL COLOR: Green. Hx of yellow  ABNL VS/O2: Daily VS and assessment  MOBILITY: Independent in room, SBA in hallway  PAIN MANAGMENT: Denies Pain  DIET: Regular  BOWEL/BLADDER: Continent, up to BR   ABNL LAB/BG: NA  DRAIN/DEVICES: NA  SKIN: WDL  D/C DATE: Discharge pending guardianship and potential inpatient psych placement.  OTHER IMPORTANT INFO: Door alarm in place, sitter at bedside due to elopement risk.

## 2021-06-28 NOTE — PLAN OF CARE
DATE & TIME: 6/28/21 7-3pm  Cognitive Concerns/ Orientation : A&O to self  BEHAVIOR & AGGRESSION TOOL COLOR: Green. Hx of yellow  ABNL VS/O2: Daily VS and assessment  MOBILITY: Independent in room, SBA in hallway  PAIN MANAGMENT: Denies Pain  DIET: Regular  BOWEL/BLADDER: Continent, up to BR   ABNL LAB/BG: NA  DRAIN/DEVICES: NA  SKIN: WDL  D/C DATE: Discharge pending guardianship and potential inpatient psych placement.  OTHER IMPORTANT INFO: Door alarm in place, sitter at bedside due to elopement risk.

## 2021-06-28 NOTE — PLAN OF CARE
DATE & TIME: 6/27/21, [id number]      Cognitive Concerns/ Orientation : A&O to self, calm during the evening aother than he was wanting to talk to the  who admitted  BEHAVIOR & AGGRESSION TOOL COLOR: Green/yellow, Hx of yellow  ABNL VS/O2: Daily VS and assessment  MOBILITY: Independent in room, SBA in hallway  PAIN MANAGMENT: Denies Pain  DIET: Regular  BOWEL/BLADDER: Continent  ABNL LAB/BG: NA  DRAIN/DEVICES: NA  SKIN: WDL  D/C DATE: Discharge pending guardianship and potential inpatient psych placement.  OTHER IMPORTANT INFO: door alarm in place, sitter at bedside.

## 2021-06-29 ENCOUNTER — TELEPHONE (OUTPATIENT)
Dept: BEHAVIORAL HEALTH | Facility: CLINIC | Age: 56
End: 2021-06-29

## 2021-06-29 PROCEDURE — 250N000013 HC RX MED GY IP 250 OP 250 PS 637: Performed by: HOSPITALIST

## 2021-06-29 PROCEDURE — 250N000013 HC RX MED GY IP 250 OP 250 PS 637: Performed by: PSYCHIATRY & NEUROLOGY

## 2021-06-29 PROCEDURE — 250N000013 HC RX MED GY IP 250 OP 250 PS 637: Performed by: INTERNAL MEDICINE

## 2021-06-29 PROCEDURE — 99231 SBSQ HOSP IP/OBS SF/LOW 25: CPT | Performed by: INTERNAL MEDICINE

## 2021-06-29 PROCEDURE — 120N000001 HC R&B MED SURG/OB

## 2021-06-29 RX ORDER — BENZTROPINE MESYLATE 1 MG/1
1 TABLET ORAL 2 TIMES DAILY
Status: ON HOLD
Start: 2021-06-29 | End: 2022-01-13

## 2021-06-29 RX ORDER — DIVALPROEX SODIUM 500 MG/1
500 TABLET, EXTENDED RELEASE ORAL AT BEDTIME
Status: ON HOLD
Start: 2021-06-29 | End: 2022-01-13

## 2021-06-29 RX ORDER — HALOPERIDOL 10 MG/1
10 TABLET ORAL 3 TIMES DAILY
Status: ON HOLD
Start: 2021-06-29 | End: 2022-01-13

## 2021-06-29 RX ORDER — CHOLECALCIFEROL (VITAMIN D3) 1250 MCG
1250 CAPSULE ORAL
Status: ON HOLD
Start: 2021-07-01 | End: 2022-01-13

## 2021-06-29 RX ADMIN — GUAIFENESIN 600 MG: 600 TABLET, EXTENDED RELEASE ORAL at 08:29

## 2021-06-29 RX ADMIN — HALOPERIDOL 10 MG: 5 TABLET ORAL at 08:29

## 2021-06-29 RX ADMIN — ATORVASTATIN CALCIUM 80 MG: 40 TABLET, FILM COATED ORAL at 20:33

## 2021-06-29 RX ADMIN — LORAZEPAM 1 MG: 0.5 TABLET ORAL at 11:37

## 2021-06-29 RX ADMIN — DIVALPROEX SODIUM 500 MG: 500 TABLET, FILM COATED, EXTENDED RELEASE ORAL at 20:28

## 2021-06-29 RX ADMIN — SALMETEROL XINAFOATE 1 PUFF: 50 POWDER, METERED ORAL; RESPIRATORY (INHALATION) at 20:38

## 2021-06-29 RX ADMIN — DOCUSATE SODIUM 100 MG: 100 CAPSULE, LIQUID FILLED ORAL at 20:28

## 2021-06-29 RX ADMIN — RISPERIDONE 1 MG: 1 TABLET, ORALLY DISINTEGRATING ORAL at 13:32

## 2021-06-29 RX ADMIN — BENZTROPINE MESYLATE 1 MG: 0.5 TABLET ORAL at 20:33

## 2021-06-29 RX ADMIN — HALOPERIDOL 10 MG: 5 TABLET ORAL at 20:33

## 2021-06-29 RX ADMIN — DOCUSATE SODIUM 100 MG: 100 CAPSULE, LIQUID FILLED ORAL at 08:29

## 2021-06-29 RX ADMIN — SALMETEROL XINAFOATE 1 PUFF: 50 POWDER, METERED ORAL; RESPIRATORY (INHALATION) at 08:30

## 2021-06-29 RX ADMIN — MEMANTINE 5 MG: 5 TABLET ORAL at 08:29

## 2021-06-29 RX ADMIN — LEVOTHYROXINE SODIUM 88 MCG: 88 TABLET ORAL at 08:29

## 2021-06-29 RX ADMIN — RISPERIDONE 3 MG: 1 TABLET, ORALLY DISINTEGRATING ORAL at 08:28

## 2021-06-29 RX ADMIN — HALOPERIDOL 10 MG: 5 TABLET ORAL at 13:32

## 2021-06-29 RX ADMIN — RISPERIDONE 3 MG: 1 TABLET, ORALLY DISINTEGRATING ORAL at 20:28

## 2021-06-29 RX ADMIN — Medication 800 UNITS: at 08:29

## 2021-06-29 RX ADMIN — GUAIFENESIN 600 MG: 600 TABLET, EXTENDED RELEASE ORAL at 20:28

## 2021-06-29 RX ADMIN — ASPIRIN 81 MG: 81 TABLET, COATED ORAL at 08:29

## 2021-06-29 RX ADMIN — BENZTROPINE MESYLATE 1 MG: 0.5 TABLET ORAL at 08:29

## 2021-06-29 ASSESSMENT — ACTIVITIES OF DAILY LIVING (ADL)
ADLS_ACUITY_SCORE: 11

## 2021-06-29 NOTE — PLAN OF CARE
DATE & TIME: 6/28/21 1979-0604  Cognitive Concerns/ Orientation : A&O to self  BEHAVIOR & AGGRESSION TOOL COLOR: Green. Hx of yellow  ABNL VS/O2: Daily VS and assessment  MOBILITY: Independent in room, SBA in hallway  PAIN MANAGMENT: Denies Pain  DIET: Regular  BOWEL/BLADDER: Continent, up to BR   ABNL LAB/BG: NA  DRAIN/DEVICES: NA  SKIN: WDL  D/C DATE: Discharge pending guardianship and potential inpatient psych placement.  OTHER IMPORTANT INFO: Door alarm in place, sitter at bedside due to elopement risk.

## 2021-06-29 NOTE — PROGRESS NOTES
[facility]    Medicine Progress Note - Hospitalist Service        Date of Admission:  9/9/2020  6:29 AM    Assessment & Plan:      John Juárez is a 55 year old male with PMHx of schizophrenia, hx of TBI, alcohol use disorder, COPD, hyperlipidemia and hypothyroidism who was admitted on 9/9/2020 for evaluation of aggressive behavior at his group home.   Was evaluated by Psychiatry and his condition stabilized, though his group home was unwilling to take him back and thus hospitalization has been prolonged awaiting new placement and guardianship. Under civil commitment through [facility].  Noted increasingly agitated behavior and attempts at elopement in May 2021.     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  Brought to hospital for evaluation of aggressive behaviors. Group home now unwilling to take him back. Has had numerous CODE 21s called this stay. Seen by psych, meds adjusted. Is currently under civil commitment and court hold through [facility] until 7/31/21.  -Appreciate Psychiatry involvement  -Haldol increased to 10 mg TID on 5/23, because of increasing agitation.  -Benztropine 1mg BID for EPS.  -Valproic acid 500mg at bedtime.  -Door alarm, 1:1 as needed.  -SW following for placement, placement will occur once guardianship is established. The anticipated discharge is to [facility] which is a skilled nursing facility which has a secured male unit for patients with mental health diagnosis.  - 6/8/21 - consulted psychiatry again due to ongoing aggressive behaviors/hallucinations/delusions. See Dr. Murrieta's note from 6/8/21, appreciate his assistance.  - 6/9/21 - psychiatry saw patient again. Increased Risperdal to 3 mg PO BID. Pursuing transfer to inpatient psychiatric unit for ongoing care until guardian is appointed.  - consulted psychiatry for  6/15 and 6/17, consult noted, possible trial for clozaril added to his [facility]z petition.  [place] he is best served in inpatient psychiatry unit and possible long acting injectable for behavior control and people willing to take guardianship role.  - 6/21 Psych evaluated- continue commitment, still awaiting determination for clozaril--> trying to investigate transfer to [facility]. Patient also tried to elope x2, had code 21s.   - 6/22 reconsult psych for med adjustment, most of his behaviors are during day---added scheduled 1mg risperdal in afternoon  -6/23: Another attempt to elope with code 21 called  -6/24: previous provider discussed case with lead psychiatrist at [place] who stated that at this time patient could not transfer to [place] given underlying severe cognitive issues.  He will discuss further with rest of the team; however per Dr Batres, in Patient Psychiatry has agreed to accept patient. Awaiting final confirmation.     Vitamin D Deficiency  Vit D level 16, 17 in May and June. Has been in hospital since Sept 2020.  -started on cholecalciferol 04584 units on 6/17     Chronic/Stable/Resolved  Possible Tardive Dyskinesia vs EPSE  Per psych assessment on 4/1/21, noted to have possible tardive dyskinesia vs extrapyramidal side effects of meds.  - At that time, recommended to increase Cogentin to 1mg BID and add vitamin E 800 international unit(s) daily.      Hyperlipidemia  - Chronic and stable on low dose ASA and atorvastatin.     COPD  - Not O2 dependent. PTA on salmeterol and albuterol PRN  - occasionally refusing salmeterol  - 6/22 noted to have a cough intermittently over past few days--dry  - add mucinex BID  - encourage inhaler compliance     Hypothyroidism  - Chronic and stable on levothyroxine.     Tobacco use disorder  - Using nicotine patch and PRN gum.     Hx of infective endocarditis with severe mitral and aortic regurgitation S/P bioprosthetic valve replacement (10/2015)  Systolic  heart murmur  HFrEF, not in acute exacerbation  Follows with Dr. Dockery of Heart and Vascular Cardiology, last seen in 1/2020. Note heart murmur on exam, strongest in aortic region. No CP, SOB, dizziness, syncope. Echocardiogram in 5/2016 with EF 50%, no evidence for prosthetic mitral and aortic valve dysfunction.   - Follow-up with outpatient Cardiology upon dismissal from the hospital (on every 2 year follow-ups).  - Continue ASA.     Hx of CVA  - Note basal ganglia stroke in 2015. No focal neuro deficits aside from cognitive dysfunction as above.      Non-severe malnutrition  - Dietitian consulted.    Diet: Room Service  Diet  Combination Diet Regular Diet Adult; Safe Tray - with utensils  Snacks/Supplements Adult: Other; Bedtime snack; Between Meals  Room Service     DVT Prophylaxis: Pneumatic Compression Devices   Valentin Catheter: Not present  Code Status: Full Code     Disposition Plan    Expected discharge: Likely today if accepted at in-pt psych  Entered: Teja Cardona MD 06/29/2021, 3:04 PM        The patient's care was discussed with the Bedside Nurse and Patient.    Teja Cardona MD  Hospitalist Service  [facility]  Securely message with the Vocera Web Console (learn more here)  Text page via [employer] Paging/Directory    ______________________________________________________________________    Interval History   No acute events overnight. No new nursing concerns.    Data reviewed today: I reviewed all medications, new labs and imaging results over the last 24 hours. I personally reviewed no images or EKG's today.    Physical Exam   Vital signs:  Temp: 97.4  F (36.3  C) Temp src: Oral BP: 110/75 Pulse: 63   Resp: 16 SpO2: 96 % O2 Device: None (Room air)   Height: 182.9 cm (6') Weight: 84.2 kg (185 lb 9.6 oz)  Estimated body mass index is 25.17 kg/m  as calculated from the following:    Height as of this encounter: 1.829 m (6').    Weight as of this encounter: 84.2 kg (185 lb  9.6 oz).      Wt Readings from Last 2 Encounters:   03/31/21 84.2 kg (185 lb 9.6 oz)   08/26/20 70.3 kg (155 lb)       Gen: AAOX1-2, NAD  Resp: CTA B/L, normal WOB  CVS: RRR, no murmur  Abd/GI: Soft, non-tender. BS- normoactive.    Neuro- CN- intact.      Data   No lab results found in last 7 days.    No results found for this or any previous visit (from the past 24 hour(s)).  Medications       aspirin  81 mg Oral Daily     atorvastatin  80 mg Oral At Bedtime     benztropine  1 mg Oral BID     cholecalciferol  1,250 mcg Oral Q7 Days     divalproex sodium extended-release  500 mg Oral At Bedtime     docusate sodium  100 mg Oral BID     guaiFENesin  600 mg Oral BID     haloperidol  10 mg Oral TID     levothyroxine  88 mcg Oral Daily     memantine  5 mg Oral Daily     risperiDONE  1 mg Sublingual Daily     risperiDONE  3 mg Sublingual BID     salmeterol  1 puff Inhalation BID     vitamin E  800 Units Oral Daily         }

## 2021-06-29 NOTE — PROGRESS NOTES
Writer called [facility] @ [phone] to check on the status of transferring Gene to [facility]. I called at 11:45 and they said he was not on there list. I asked to talk to Yaa Hu, they transferred me to her phone she did not answer so I left a message for her to call me back. At 13:45 I called central back I talked to Aneesh who said he did not know anything about this. He was going to check on it and get back to me. He said he will call Yaa again.

## 2021-06-29 NOTE — TELEPHONE ENCOUNTER
S:   Pt is a 55 yrs old male in the [facility].  Attending provider requests transfer to inpt mental health placement.   [phone]    B: Pt has been hospitalized since September of 2020.  He has a dx of Schizophrenia and TBI.  Pt came in due to agitation.  Pt's behavior can be good and bad.  Pt tries to leave 1-2x/day and when he tries to they code 21.  Pt talks to someone else, tells him he can go back to his mansion.  Pt is on a lot of meds and gets them changed every other day.      Pt is medically cleared for mh inpt.  Pt ambulates, eats, and voids independently.  COVID is negative and gets checked every 7 days.  His last COVID test result was on 6/25/21 and it was negative.      A: Pt is his own guardian.  He is on a court hold for almost a year now and will meet in court again for a decision on July 8th- to be on court hold for another year (through [facility]).     R: Per Yaa, Dr. Sorenson has reviewed and Pt is ready for mh inpt placement.      Attending provider is Dr. Cardona.  His direct# is [phone] for a warm hand off.       4:10-Paged Dr. Fontaine.  She stated that Pt does not meet criteria for 12.      4:43PM- Dr. Morfin accepts for 32.  He completed doc to doc last week.       Patient cleared and ready for behavioral bed placement: Yes

## 2021-06-29 NOTE — PLAN OF CARE
DATE & TIME: 6/29/21 5822-6519  Cognitive Concerns/ Orientation : A&O to self  BEHAVIOR & AGGRESSION TOOL COLOR: Green. Hx of yellow  ABNL VS/O2: Daily VS and assessment  MOBILITY: Independent in room, SBA in hallway  PAIN MANAGMENT: Denies Pain  DIET: Regular  BOWEL/BLADDER: Continent, up to BR   ABNL LAB/BG: NA  DRAIN/DEVICES: NA  SKIN: WDL  D/C DATE: Discharge pending guardianship and potential inpatient psych placement.  OTHER IMPORTANT INFO: Door alarm in place, sitter at bedside due to elopement risk. Calm and cooperative this shift, slept well

## 2021-06-29 NOTE — PLAN OF CARE
DATE & TIME: 6/29/21 9072-5149  Cognitive Concerns/ Orientation : A&O to self  BEHAVIOR & AGGRESSION TOOL COLOR: Yellow, left the floor x1.   ABNL VS/O2: VSS, Daily VS and assessment  MOBILITY: Independent in room, SBA in hallway  PAIN MANAGMENT: Denies Pain  DIET: Regular  BOWEL/BLADDER: Continent, up to BR   ABNL LAB/BG: NA  DRAIN/DEVICES: NA  SKIN: WDL  D/C DATE: Discharge pending guardianship and potential inpatient psych placement.  OTHER IMPORTANT INFO: Door alarm in place, sitter at bedside due to elopement risk. Attempted to leave the floor x1, able to redirect back to the room, received prn Ativan 1 mg x1.

## 2021-06-29 NOTE — PROGRESS NOTES
Just talked to Kelly in [facility] who is accessing Gene. She said she did not know if she had an appropriate bed. She will call me back. Then she said that she would need unit 12 that is a locked unit. She said she would call me back.

## 2021-06-29 NOTE — PROGRESS NOTES
Care Management Follow Up    Length of Stay (days): 283    Expected Discharge Date: (when psychiatry has a bed)     Concerns to be Addressed: patient refuses services, discharge planning     Patient plan of care discussed at interdisciplinary rounds: Yes    Anticipated Discharge Disposition: Inpatient Mental Health     Anticipated Discharge Services: (ongoig commitment process)  Anticipated Discharge DME: None    Patient/family educated on Medicare website which has current facility and service quality ratings: (not applicable)  Education Provided on the Discharge Plan:    Patient/Family in Agreement with the Plan: other (see comments)(family not involved, patient delusional)    Referrals Placed by CM/SW: Post Acute Facilities  Private pay costs discussed: Not applicable    Additional Information:  Dr Batres contacted writer and stated [facility]  In Patient Psychiatry has agreed to accept patient.  Next step is for 66 Unit Charge nurse to contact Intake and explain  patient is medically stable for discharge from this hospital and to ask when Behavioral Health can accept patient.  The two supervisors at Intake who are familiar with this transfer are Yaa and Akin.    Dr Batres requested to be contacted if there are concerns which arise when Charge calls Intake.   Writer communicated this to Shellie Martinez RN Charge Nurse.  Once specific day and time is known, writer will send secure email to the following people to notify them of patient's new location.  1.  [facility] Assistant  Lynda Mo.  Ms Mo is the  handling the hearing requesting the extension of patients MI Commitment. Examination is schedlued for July 8th @ at 1:00 and Hearing at 1:45 [email or web address].  2.  Patient's Behavior  Kurtis Chapin.  Mr Chapin is on vacation thru July 6th, will send him a secure email at [email or web address] and copy his supervisor Suzi Rodríguez at [email or web address]  3.  Will contact his sister  Freida Lopez does contact her from time to time.          Lulu Contreras, [facility] Medical CenterSW

## 2021-06-30 ENCOUNTER — HOSPITAL ENCOUNTER (INPATIENT)
Facility: CLINIC | Age: 56
LOS: 202 days | Discharge: ANOTHER HOSPITAL | DRG: 885 | End: 2022-01-18
Attending: PSYCHIATRY & NEUROLOGY | Admitting: PSYCHIATRY & NEUROLOGY
Payer: COMMERCIAL

## 2021-06-30 VITALS
DIASTOLIC BLOOD PRESSURE: 70 MMHG | HEART RATE: 72 BPM | RESPIRATION RATE: 16 BRPM | TEMPERATURE: 97.2 F | SYSTOLIC BLOOD PRESSURE: 115 MMHG | BODY MASS INDEX: 25.14 KG/M2 | OXYGEN SATURATION: 95 % | WEIGHT: 185.6 LBS | HEIGHT: 72 IN

## 2021-06-30 DIAGNOSIS — I10 HYPERTENSION, UNSPECIFIED TYPE: ICD-10-CM

## 2021-06-30 DIAGNOSIS — F03.91 DEMENTIA WITH BEHAVIORAL DISTURBANCE, UNSPECIFIED DEMENTIA TYPE: ICD-10-CM

## 2021-06-30 DIAGNOSIS — U07.1 INFECTION DUE TO 2019 NOVEL CORONAVIRUS: ICD-10-CM

## 2021-06-30 DIAGNOSIS — F20.9 CHRONIC SCHIZOPHRENIA (H): ICD-10-CM

## 2021-06-30 DIAGNOSIS — F17.200 SMOKER: ICD-10-CM

## 2021-06-30 DIAGNOSIS — E78.5 HYPERLIPIDEMIA, UNSPECIFIED HYPERLIPIDEMIA TYPE: ICD-10-CM

## 2021-06-30 DIAGNOSIS — F41.9 ANXIETY: ICD-10-CM

## 2021-06-30 DIAGNOSIS — R74.01 TRANSAMINITIS: Primary | ICD-10-CM

## 2021-06-30 DIAGNOSIS — F02.818 DEMENTIA ASSOCIATED WITH OTHER UNDERLYING DISEASE WITH BEHAVIORAL DISTURBANCE (H): ICD-10-CM

## 2021-06-30 DIAGNOSIS — K74.60 CIRRHOSIS OF LIVER WITHOUT ASCITES, UNSPECIFIED HEPATIC CIRRHOSIS TYPE (H): ICD-10-CM

## 2021-06-30 DIAGNOSIS — Z86.19 HISTORY OF HEPATITIS C: ICD-10-CM

## 2021-06-30 PROBLEM — F91.9 BEHAVIOR DISTURBANCE: Status: ACTIVE | Noted: 2021-06-30

## 2021-06-30 LAB — VALPROATE SERPL-MCNC: 73 MG/L (ref 50–100)

## 2021-06-30 PROCEDURE — 99239 HOSP IP/OBS DSCHRG MGMT >30: CPT | Performed by: INTERNAL MEDICINE

## 2021-06-30 PROCEDURE — 124N000002 HC R&B MH UMMC

## 2021-06-30 PROCEDURE — 250N000013 HC RX MED GY IP 250 OP 250 PS 637: Performed by: PSYCHIATRY & NEUROLOGY

## 2021-06-30 PROCEDURE — 250N000013 HC RX MED GY IP 250 OP 250 PS 637: Performed by: INTERNAL MEDICINE

## 2021-06-30 PROCEDURE — 99232 SBSQ HOSP IP/OBS MODERATE 35: CPT | Performed by: PSYCHIATRY & NEUROLOGY

## 2021-06-30 PROCEDURE — 250N000013 HC RX MED GY IP 250 OP 250 PS 637: Performed by: HOSPITALIST

## 2021-06-30 PROCEDURE — 80164 ASSAY DIPROPYLACETIC ACD TOT: CPT | Performed by: PSYCHIATRY & NEUROLOGY

## 2021-06-30 PROCEDURE — 36415 COLL VENOUS BLD VENIPUNCTURE: CPT | Performed by: PSYCHIATRY & NEUROLOGY

## 2021-06-30 RX ORDER — ALBUTEROL SULFATE 90 UG/1
1-2 AEROSOL, METERED RESPIRATORY (INHALATION) EVERY 4 HOURS PRN
Status: DISCONTINUED | OUTPATIENT
Start: 2021-06-30 | End: 2022-01-18 | Stop reason: HOSPADM

## 2021-06-30 RX ORDER — DIVALPROEX SODIUM 500 MG/1
500 TABLET, EXTENDED RELEASE ORAL AT BEDTIME
Status: DISCONTINUED | OUTPATIENT
Start: 2021-06-30 | End: 2021-07-02

## 2021-06-30 RX ORDER — BENZTROPINE MESYLATE 1 MG/1
1 TABLET ORAL 2 TIMES DAILY
Status: DISCONTINUED | OUTPATIENT
Start: 2021-06-30 | End: 2021-10-15

## 2021-06-30 RX ORDER — GUAIFENESIN 600 MG/1
600 TABLET, EXTENDED RELEASE ORAL 2 TIMES DAILY
Status: DISCONTINUED | OUTPATIENT
Start: 2021-06-30 | End: 2021-10-19

## 2021-06-30 RX ORDER — LANOLIN ALCOHOL/MO/W.PET/CERES
3 CREAM (GRAM) TOPICAL
Status: DISCONTINUED | OUTPATIENT
Start: 2021-06-30 | End: 2022-01-18 | Stop reason: HOSPADM

## 2021-06-30 RX ORDER — ATORVASTATIN CALCIUM 80 MG/1
80 TABLET, FILM COATED ORAL AT BEDTIME
Status: DISCONTINUED | OUTPATIENT
Start: 2021-06-30 | End: 2022-01-18 | Stop reason: HOSPADM

## 2021-06-30 RX ORDER — VITAMIN B COMPLEX
50 TABLET ORAL DAILY
Status: DISCONTINUED | OUTPATIENT
Start: 2021-07-01 | End: 2021-11-25

## 2021-06-30 RX ORDER — HALOPERIDOL 10 MG/1
10 TABLET ORAL 3 TIMES DAILY
Status: DISCONTINUED | OUTPATIENT
Start: 2021-06-30 | End: 2021-07-02

## 2021-06-30 RX ORDER — POLYETHYLENE GLYCOL 3350 17 G
2 POWDER IN PACKET (EA) ORAL
Status: DISCONTINUED | OUTPATIENT
Start: 2021-06-30 | End: 2022-01-18 | Stop reason: HOSPADM

## 2021-06-30 RX ORDER — HALOPERIDOL 5 MG/ML
10 INJECTION INTRAMUSCULAR EVERY 6 HOURS PRN
Status: DISCONTINUED | OUTPATIENT
Start: 2021-06-30 | End: 2021-07-08

## 2021-06-30 RX ORDER — RISPERIDONE 1 MG/1
1 TABLET, ORALLY DISINTEGRATING ORAL EVERY 6 HOURS PRN
Status: DISCONTINUED | OUTPATIENT
Start: 2021-06-30 | End: 2022-01-18 | Stop reason: HOSPADM

## 2021-06-30 RX ORDER — ACETAMINOPHEN 325 MG/1
650 TABLET ORAL EVERY 4 HOURS PRN
Status: DISCONTINUED | OUTPATIENT
Start: 2021-06-30 | End: 2022-01-18 | Stop reason: HOSPADM

## 2021-06-30 RX ORDER — MEMANTINE HYDROCHLORIDE 5 MG/1
5 TABLET ORAL DAILY
Status: DISCONTINUED | OUTPATIENT
Start: 2021-07-01 | End: 2021-07-14

## 2021-06-30 RX ORDER — RISPERIDONE 1 MG/1
1 TABLET, ORALLY DISINTEGRATING ORAL EVERY 6 HOURS PRN
Status: ON HOLD
Start: 2021-06-30 | End: 2022-09-20

## 2021-06-30 RX ORDER — CHOLECALCIFEROL (VITAMIN D3) 1250 MCG
1250 CAPSULE ORAL
Status: DISCONTINUED | OUTPATIENT
Start: 2021-07-01 | End: 2022-01-18 | Stop reason: HOSPADM

## 2021-06-30 RX ORDER — LORAZEPAM 0.5 MG/1
.5-1 TABLET ORAL EVERY 4 HOURS PRN
Status: DISCONTINUED | OUTPATIENT
Start: 2021-06-30 | End: 2021-07-14

## 2021-06-30 RX ORDER — RISPERIDONE 1 MG/1
1 TABLET, ORALLY DISINTEGRATING ORAL DAILY
Status: DISCONTINUED | OUTPATIENT
Start: 2021-07-01 | End: 2021-07-06

## 2021-06-30 RX ORDER — POLYETHYLENE GLYCOL 3350 17 G/17G
17 POWDER, FOR SOLUTION ORAL DAILY
Status: DISCONTINUED | OUTPATIENT
Start: 2021-07-01 | End: 2021-10-11

## 2021-06-30 RX ORDER — ASPIRIN 81 MG/1
81 TABLET ORAL DAILY
Status: DISCONTINUED | OUTPATIENT
Start: 2021-07-01 | End: 2022-01-18 | Stop reason: HOSPADM

## 2021-06-30 RX ORDER — MAGNESIUM HYDROXIDE/ALUMINUM HYDROXICE/SIMETHICONE 120; 1200; 1200 MG/30ML; MG/30ML; MG/30ML
30 SUSPENSION ORAL EVERY 4 HOURS PRN
Status: DISCONTINUED | OUTPATIENT
Start: 2021-06-30 | End: 2022-01-18 | Stop reason: HOSPADM

## 2021-06-30 RX ORDER — RISPERIDONE 3 MG/1
3 TABLET, ORALLY DISINTEGRATING ORAL 2 TIMES DAILY
Status: COMPLETED | OUTPATIENT
Start: 2021-06-30 | End: 2021-07-08

## 2021-06-30 RX ORDER — RISPERIDONE 1 MG/1
1 TABLET, ORALLY DISINTEGRATING ORAL DAILY
Status: ON HOLD
Start: 2021-07-01 | End: 2022-01-13

## 2021-06-30 RX ORDER — DOCUSATE SODIUM 100 MG/1
100 CAPSULE, LIQUID FILLED ORAL 2 TIMES DAILY
Status: DISCONTINUED | OUTPATIENT
Start: 2021-06-30 | End: 2021-10-11

## 2021-06-30 RX ORDER — RISPERIDONE 3 MG/1
3 TABLET, ORALLY DISINTEGRATING ORAL 2 TIMES DAILY
Status: ON HOLD
Start: 2021-06-30 | End: 2022-01-13

## 2021-06-30 RX ORDER — LEVOTHYROXINE SODIUM 88 UG/1
88 TABLET ORAL DAILY
Status: DISCONTINUED | OUTPATIENT
Start: 2021-07-01 | End: 2022-01-18 | Stop reason: HOSPADM

## 2021-06-30 RX ADMIN — SALMETEROL XINAFOATE 1 PUFF: 50 POWDER, METERED ORAL; RESPIRATORY (INHALATION) at 23:55

## 2021-06-30 RX ADMIN — RISPERIDONE 1 MG: 1 TABLET, ORALLY DISINTEGRATING ORAL at 13:01

## 2021-06-30 RX ADMIN — DOCUSATE SODIUM 100 MG: 100 CAPSULE, LIQUID FILLED ORAL at 23:47

## 2021-06-30 RX ADMIN — DOCUSATE SODIUM 100 MG: 100 CAPSULE, LIQUID FILLED ORAL at 08:20

## 2021-06-30 RX ADMIN — SALMETEROL XINAFOATE 1 PUFF: 50 POWDER, METERED ORAL; RESPIRATORY (INHALATION) at 08:24

## 2021-06-30 RX ADMIN — LEVOTHYROXINE SODIUM 88 MCG: 88 TABLET ORAL at 08:20

## 2021-06-30 RX ADMIN — ASPIRIN 81 MG: 81 TABLET, COATED ORAL at 08:20

## 2021-06-30 RX ADMIN — GUAIFENESIN 600 MG: 600 TABLET, EXTENDED RELEASE ORAL at 23:47

## 2021-06-30 RX ADMIN — HALOPERIDOL 10 MG: 5 TABLET ORAL at 08:20

## 2021-06-30 RX ADMIN — HALOPERIDOL 10 MG: 5 TABLET ORAL at 13:01

## 2021-06-30 RX ADMIN — GUAIFENESIN 600 MG: 600 TABLET, EXTENDED RELEASE ORAL at 08:20

## 2021-06-30 RX ADMIN — BENZTROPINE MESYLATE 1 MG: 1 TABLET ORAL at 23:47

## 2021-06-30 RX ADMIN — ATORVASTATIN CALCIUM 80 MG: 80 TABLET, FILM COATED ORAL at 23:46

## 2021-06-30 RX ADMIN — RISPERIDONE 3 MG: 1 TABLET, ORALLY DISINTEGRATING ORAL at 08:20

## 2021-06-30 RX ADMIN — RISPERIDONE 3 MG: 3 TABLET, ORALLY DISINTEGRATING ORAL at 23:47

## 2021-06-30 RX ADMIN — MEMANTINE 5 MG: 5 TABLET ORAL at 08:20

## 2021-06-30 RX ADMIN — DIVALPROEX SODIUM 500 MG: 500 TABLET, EXTENDED RELEASE ORAL at 23:47

## 2021-06-30 RX ADMIN — HALOPERIDOL 10 MG: 10 TABLET ORAL at 23:47

## 2021-06-30 RX ADMIN — Medication 800 UNITS: at 08:20

## 2021-06-30 RX ADMIN — BENZTROPINE MESYLATE 1 MG: 0.5 TABLET ORAL at 08:20

## 2021-06-30 ASSESSMENT — ACTIVITIES OF DAILY LIVING (ADL)
DRESS: INDEPENDENT
ORAL_HYGIENE: INDEPENDENT
ADLS_ACUITY_SCORE: 11
LAUNDRY: WITH SUPERVISION
HYGIENE/GROOMING: INDEPENDENT
ADLS_ACUITY_SCORE: 11

## 2021-06-30 ASSESSMENT — MIFFLIN-ST. JEOR: SCORE: 1754.32

## 2021-06-30 NOTE — PROGRESS NOTES
Writer called central intake @ [phone] to check on the status of transferring Gene to [facility]. I called at 8:58am and they said they are discussing a more high risk locked unit (station 12). Aneesh is the care coordinator working on Gene's case today. Coordinator is trying to figure out if Gene is still accepted on station 32 or not. Per Psych note from this AM pt was accepted to station 32. Aneesh said he would keep in touch with charge on any updates.    UPDATE 1006: Aneesh the coordinator called and said that the hospital  and nurse managers are working together to create a plan as to where pt should go. Aneesh said the case is primarily out of his hands at this point but will update as new information is available.

## 2021-06-30 NOTE — PLAN OF CARE
Discharge    Patient discharged to [facility] station #30 via Stret[facility] with [facility]   Care plan note  DATE & TIME: 6/30/21 1943-0730  Cognitive Concerns/ Orientation : A&O to self  BEHAVIOR & AGGRESSION TOOL COLOR: Green today, calm and cooperative.   ABNL VS/O2: VSS, Daily VS and assessment  MOBILITY: Independent in room, SBA in hallway  PAIN MANAGMENT: Denies Pain  DIET: Regular  BOWEL/BLADDER: Continent, up to BR   ABNL LAB/BG: NA  DRAIN/DEVICES: NA  SKIN: WDL  D/C DATE: Discharging today 6/30/21 to inpatient [facility] sation #30.   OTHER IMPORTANT INFO: Door alarm in place, sitter at bedside due to elopement risk. Called the receiving RN for report. [facility] transport set up at 1900, who are running half hour late, reported they will be here around 1930. Listed belongings sweat shirt, sweat pant, pair of socks returned to the pt to change into. A box of cigarette, lighter, poker game device and 3 cards in a separate bag to sent with the transport.     Listed belongings gathered and returned to patient. Yes  Belongings returned to patient from security/pharmacy Yes  Care Plan and Patient education resolved: Yes  Prescriptions if needed, hard copies sent with patient  NA  Home and hospital acquired medications returned to patient: Yes  Medication Bin checked and emptied on discharge Yes  Follow up appointment made for patient: Yes
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
## 2021-06-30 NOTE — PROGRESS NOTES
Care Management Follow Up    Length of Stay (days): 284    Expected Discharge Date: (when psychiatry has a bed)     Concerns to be Addressed: patient refuses services, discharge planning     Patient plan of care discussed at interdisciplinary rounds: Yes    Anticipated Discharge Disposition: Inpatient Mental Health     Anticipated Discharge Services: (ongoig commitment process)  Anticipated Discharge DME: None    Patient/family educated on Medicare website which has current facility and service quality ratings: (not applicable)  Education Provided on the Discharge Plan:    Patient/Family in Agreement with the Plan: other (see comments)(family not involved, patient delusional)    Referrals Placed by CM/SW: Post Acute Facilities  Private pay costs discussed: Not applicable    Additional Information:  The hospital is waiting to hear if the request to add Clozaril to the current Yanez has been approved by the  who represented patient during the original hearing.  The request was submitted to [facility] on 6/23/21.  Writer contacted tatiana Ross at [phone].  She has not heard back from the  and will reach out to the  to determine the delay in their response.       ASIM CastilloSW

## 2021-06-30 NOTE — CONSULTS
"Patient seen this morning, full note to follow      [facility] Psychiatric Consult Progress Note    Interval History:   Pt seen, chart reviewed, case discussed with nursing staff.  John is much the same.  This morning he is lying in bed, he does make eye contact with me when I verbally address him but he tends to mumble.  He has been intermittently delusional about owning mentions, still gets intermittently agitated and there have been numerous incidences where a code 21 has been called due to agitation, and elopement attempts.  I have spoken with Dr. Batres and we have requested transfer to [facility] for obvious reasons.  My understanding is that this request has been approved, Dr.Joe Morfin will be the accepting physician.  From what I can see in the chart intake has been notified of this request and apparently he is slated to go to station 32 today per my discussion with them this morning though I was told they have \"staffing issues\"..  We are still waiting to hear back from the court regarding adjustment of Yanez to include Clozaril.      R0 point Review of Systems completed by Sushil Wilks MD  , and is  is negative other than noted in the HPI     Medications:       aspirin  81 mg Oral Daily     atorvastatin  80 mg Oral At Bedtime     benztropine  1 mg Oral BID     cholecalciferol  1,250 mcg Oral Q7 Days     divalproex sodium extended-release  500 mg Oral At Bedtime     docusate sodium  100 mg Oral BID     guaiFENesin  600 mg Oral BID     haloperidol  10 mg Oral TID     levothyroxine  88 mcg Oral Daily     memantine  5 mg Oral Daily     risperiDONE  1 mg Sublingual Daily     risperiDONE  3 mg Sublingual BID     salmeterol  1 puff Inhalation BID     vitamin E  800 Units Oral Daily     acetaminophen, albuterol, alum & mag hydroxide-simethicone, sore throat lozenge, haloperidol lactate, LORazepam, melatonin, naloxone **OR** naloxone **OR** naloxone **OR** naloxone, " "nicotine, nicotine, ondansetron **OR** ondansetron, polyethylene glycol, polyethylene glycol-propylene glycol, risperiDONE    Mental Status Examination:     Appearance: He is lying down, opens eyes to voice  Eye Contact: poor  speech:  Impoverished, dysarthric, monotone, speaks softly, slowly. slightly louder when he gets frustrated  language: Poor  Psychomotor Behavior:  some buccal-lingual dyskinesia, chewing movements  Mood: \"OK\"  affect: Blunted   thought Process:  paucity of speech, no obvious loosening of associations flight of ideas or formal thought disorder  Thought Content:  no evidence of suicidal ideation or homicidal ideation, but he appears paranoid, making delusional statements frequently when more awake and talking  per chart notes  Oriented to: x2    Recent and Remote Memory:  limited, intermittently oriented to hospital, difficult to assess   Fund of Knowledge: delayed  Insight:  limited  Judgment:  limited         Labs/Vitals:     No results found for this or any previous visit (from the past 24 hour(s)).  /75 (BP Location: Right arm)   Pulse 63   Temp 97.4  F (36.3  C) (Oral)   Resp 16   Ht 1.829 m (6')   Wt 84.2 kg (185 lb 9.6 oz)   SpO2 96%   BMI 25.17 kg/m     Impression  John is a 55-year-old gentleman with a known major neurocognitive disorder and schizophrenic spectrum illness.  He continues to show significant impairment, poor insight, fluctuating levels of psychosis and agitation. He is on the wait list for [facility] station [facility] per discussion today with intake.    1.  Schizophrenia, unspecified  2.  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  Alcohol use disorder in remission.   4.  Stimulant use disorder, in remission.   5.  Chronic obstructive pulmonary disease.   6.  Hypertension.   7. R/O mild TD vs EPSE    1.  Continue current psychotropic medications unchanged   2.  Guardianship and ultimate placement remains a significant issue.  I " believe that he would best be managed on a locked psychiatric unit where his medications can be adequately monitored, he has more freedom to move around, more accurate assessment can be undertaken. I understand he is approved for transfer to [facility] station [facility]  3.  We are awaiting determination if Clozaril can be added to Yanez    4.  Continue commitment, Yanez, pursuit of guardianship-these issues are well documented in the current medical record  5. I ordered a depakote level today  Attestation:  Patient has been seen and evaluated by me,  Sushil Wilks MD

## 2021-06-30 NOTE — PLAN OF CARE
DATE & TIME: 6/29/21 7553-2975  Cognitive Concerns/ Orientation : A&O to self only. Pacing in halls during evening x2, able to redirect and give scheduled medications. Resting overnight.  BEHAVIOR & AGGRESSION TOOL COLOR: Green, hx yellow  ABNL VS/O2: VSS, daily VS and assessment  MOBILITY: Independent in room, SBA in hallway  PAIN MANAGMENT: Denies   DIET: Regular  BOWEL/BLADDER: Continent, up to BR   ABNL LAB/BG: NA  DRAIN/DEVICES: NA  SKIN: WDL  D/C DATE: Discharge pending guardianship and potential inpatient psych placement. Unable to tx to [facility] 6/29. Plan to transfer 6/30.  OTHER IMPORTANT INFO: Psych consult placed for 6/30. Door alarm in place, sitter at bedside due to elopement risk.     Charge RN during evenings notified writer that pt was unable to tx to [facility] on 6/29 evening due to short staffing on unit pt was assigned to and unit RN stating that pt should be assigned to a different unit.

## 2021-06-30 NOTE — PROGRESS NOTES
[facility]    Medicine Progress Note - Hospitalist Service        Date of Admission:  9/9/2020  6:29 AM    Assessment & Plan:      John Juárez is a 55 year old male with PMHx of schizophrenia, hx of TBI, alcohol use disorder, COPD, hyperlipidemia and hypothyroidism who was admitted on 9/9/2020 for evaluation of aggressive behavior at his group home.   Was evaluated by Psychiatry and his condition stabilized, though his group home was unwilling to take him back and thus hospitalization has been prolonged awaiting new placement and guardianship. Under civil commitment through [facility].  Noted increasingly agitated behavior and attempts at elopement in May 2021.     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  Brought to hospital for evaluation of aggressive behaviors. Group home now unwilling to take him back. Has had numerous CODE 21s called this stay. Seen by psych, meds adjusted. Is currently under civil commitment and court hold through [facility] until 7/31/21.  -Appreciate Psychiatry involvement  -Haldol increased to 10 mg TID on 5/23, because of increasing agitation.  -Benztropine 1mg BID for EPS.  -Valproic acid 500mg at bedtime.  -Door alarm, 1:1 as needed.  -SW following for placement, placement will occur once guardianship is established. The anticipated discharge is to [facility] which is a skilled nursing facility which has a secured male unit for patients with mental health diagnosis.  - 6/8/21 - consulted psychiatry again due to ongoing aggressive behaviors/hallucinations/delusions. See Dr. Murrieta's note from 6/8/21, appreciate his assistance.  - 6/9/21 - psychiatry saw patient again. Increased Risperdal to 3 mg PO BID. Pursuing transfer to inpatient psychiatric unit for ongoing care until guardian is appointed.  - consulted psychiatry for  6/15 and 6/17, consult noted, possible trial for clozaril added to his Yanez petition.  [place] he is best served in inpatient psychiatry unit and possible long acting injectable for behavior control and people willing to take guardianship role.  - 6/21 Psych evaluated- continue commitment, still awaiting determination for clozaril--> trying to investigate transfer to [facility]. Patient also tried to elope x2, had code 21s.   - 6/22 reconsult psych for med adjustment, most of his behaviors are during day---added scheduled 1mg risperdal in afternoon  -6/23: Another attempt to elope with code 21 called  -Awaiting transfer to in-pt psych todayt     Vitamin D Deficiency  Vit D level 16, 17 in May and June. Has been in hospital since Sept 2020.  -started on cholecalciferol 79652 units on 6/17     Chronic/Stable/Resolved  Possible Tardive Dyskinesia vs EPSE  Per psych assessment on 4/1/21, noted to have possible tardive dyskinesia vs extrapyramidal side effects of meds.  - At that time, recommended to increase Cogentin to 1mg BID and add vitamin E 800 international unit(s) daily.      Hyperlipidemia  - Chronic and stable on low dose ASA and atorvastatin.     COPD  - Not O2 dependent. PTA on salmeterol and albuterol PRN  - occasionally refusing salmeterol  - 6/22 noted to have a cough intermittently over past few days--dry  - add mucinex BID  - encourage inhaler compliance     Hypothyroidism  - Chronic and stable on levothyroxine.     Tobacco use disorder  - Using nicotine patch and PRN gum.     Hx of infective endocarditis with severe mitral and aortic regurgitation S/P bioprosthetic valve replacement (10/2015)  Systolic heart murmur  HFrEF, not in acute exacerbation  Follows with Dr. Dockery of Heart and Vascular Cardiology, last seen in 1/2020. Note heart murmur on exam, strongest in aortic region. No CP, SOB, dizziness, syncope. Echocardiogram in 5/2016 with EF 50%, no evidence for prosthetic mitral and aortic  valve dysfunction.   - Follow-up with outpatient Cardiology upon dismissal from the hospital (on every 2 year follow-ups).  - Continue ASA.     Hx of CVA  - Note basal ganglia stroke in 2015. No focal neuro deficits aside from cognitive dysfunction as above.      Non-severe malnutrition  - Dietitian consulted.    Diet: Room Service  Diet  Combination Diet Regular Diet Adult; Safe Tray - with utensils  Snacks/Supplements Adult: Other; Bedtime snack; Between Meals  Room Service     DVT Prophylaxis: Pneumatic Compression Devices   Valentin Catheter: Not present  Code Status: Full Code     Disposition Plan    Expected discharge: Likely today to in-pt psych  Entered: Teja Cardona MD 06/30/2021, 1:22 PM        The patient's care was discussed with the Bedside Nurse and Patient.    Teja Cardona MD  Hospitalist Service  [facility]  Securely message with the Vocera Web Console (learn more here)  Text page via True[employer] Paging/Directory    ______________________________________________________________________    Interval History   No acute events overnight.    Data reviewed today: I reviewed all medications, new labs and imaging results over the last 24 hours. I personally reviewed no images or EKG's today.    Physical Exam   Vital signs:  Temp: 97.2  F (36.2  C) Temp src: Oral BP: 115/70 Pulse: 72   Resp: 16 SpO2: 95 % O2 Device: None (Room air)   Height: 182.9 cm (6') Weight: 84.2 kg (185 lb 9.6 oz)  Estimated body mass index is 25.17 kg/m  as calculated from the following:    Height as of this encounter: 1.829 m (6').    Weight as of this encounter: 84.2 kg (185 lb 9.6 oz).      Wt Readings from Last 2 Encounters:   03/31/21 84.2 kg (185 lb 9.6 oz)   08/26/20 70.3 kg (155 lb)       Gen: AAOX1-2, NAD  Resp: CTA B/L, normal WOB  CVS: RRR, no murmur  Abd/GI: Soft, non-tender. BS- normoactive.    Neuro- CN- intact.      Data   No lab results found in last 7 days.    No results found for this or any  previous visit (from the past 24 hour(s)).  Medications       aspirin  81 mg Oral Daily     atorvastatin  80 mg Oral At Bedtime     benztropine  1 mg Oral BID     cholecalciferol  1,250 mcg Oral Q7 Days     divalproex sodium extended-release  500 mg Oral At Bedtime     docusate sodium  100 mg Oral BID     guaiFENesin  600 mg Oral BID     haloperidol  10 mg Oral TID     levothyroxine  88 mcg Oral Daily     memantine  5 mg Oral Daily     risperiDONE  1 mg Sublingual Daily     risperiDONE  3 mg Sublingual BID     salmeterol  1 puff Inhalation BID     vitamin E  800 Units Oral Daily         }

## 2021-06-30 NOTE — PROGRESS NOTES
RN called [facility] phone # [phone] to give report, told to call back between 4pm-5pm. They can't accept patients right now because they don't have any bed available. However they're expecting beds to open up next shift after few discharges.

## 2021-06-30 NOTE — PLAN OF CARE
DATE & TIME: 6/30/21 1656-0920  Cognitive Concerns/ Orientation : A&O to self  BEHAVIOR & AGGRESSION TOOL COLOR: Green today, calm and cooperative.   ABNL VS/O2: VSS, Daily VS and assessment  MOBILITY: Independent in room, SBA in hallway  PAIN MANAGMENT: Denies Pain  DIET: Regular  BOWEL/BLADDER: Continent, up to BR   ABNL LAB/BG: NA  DRAIN/DEVICES: NA  SKIN: WDL  D/C DATE: Possible discharge later today 6/30/21 to inpatient [facility].   OTHER IMPORTANT INFO: Door alarm in place, sitter at bedside due to elopement risk. RN is told to call for report between 4p-5p.       Update 1620  Called for report, they said they will call back in about 10 mins once they make sure they have a staff sitter for 1:1.      Update 1645   [place] station #30 called, okayed to transfer anytime. [facility] do not have transport available until 1900.     Update 1850  [facility] transport running half hour late, will be here around 1930. Updated Station [facility]30, they are okay with it.

## 2021-07-01 LAB
ALBUMIN SERPL-MCNC: 3.6 G/DL (ref 3.4–5)
ALP SERPL-CCNC: 109 U/L (ref 40–150)
ALT SERPL W P-5'-P-CCNC: 174 U/L (ref 0–70)
ANION GAP SERPL CALCULATED.3IONS-SCNC: 5 MMOL/L (ref 3–14)
AST SERPL W P-5'-P-CCNC: ABNORMAL U/L (ref 0–45)
BILIRUB SERPL-MCNC: 0.6 MG/DL (ref 0.2–1.3)
BUN SERPL-MCNC: 14 MG/DL (ref 7–30)
CALCIUM SERPL-MCNC: 9.1 MG/DL (ref 8.5–10.1)
CHLORIDE SERPL-SCNC: 107 MMOL/L (ref 94–109)
CO2 SERPL-SCNC: 28 MMOL/L (ref 20–32)
CREAT SERPL-MCNC: 0.9 MG/DL (ref 0.66–1.25)
ERYTHROCYTE [DISTWIDTH] IN BLOOD BY AUTOMATED COUNT: 14.3 % (ref 10–15)
GFR SERPL CREATININE-BSD FRML MDRD: >90 ML/MIN/{1.73_M2}
GLUCOSE SERPL-MCNC: 84 MG/DL (ref 70–99)
HCT VFR BLD AUTO: 47.3 % (ref 40–53)
HGB BLD-MCNC: 16.5 G/DL (ref 13.3–17.7)
MCH RBC QN AUTO: 33.1 PG (ref 26.5–33)
MCHC RBC AUTO-ENTMCNC: 34.9 G/DL (ref 31.5–36.5)
MCV RBC AUTO: 95 FL (ref 78–100)
PLATELET # BLD AUTO: 168 10E9/L (ref 150–450)
POTASSIUM SERPL-SCNC: 4.3 MMOL/L (ref 3.4–5.3)
PROT SERPL-MCNC: 8.8 G/DL (ref 6.8–8.8)
RBC # BLD AUTO: 4.99 10E12/L (ref 4.4–5.9)
SODIUM SERPL-SCNC: 140 MMOL/L (ref 133–144)
TSH SERPL DL<=0.005 MIU/L-ACNC: 3.14 MU/L (ref 0.4–4)
WBC # BLD AUTO: 5.1 10E9/L (ref 4–11)

## 2021-07-01 PROCEDURE — 80053 COMPREHEN METABOLIC PANEL: CPT | Performed by: PHYSICIAN ASSISTANT

## 2021-07-01 PROCEDURE — 36415 COLL VENOUS BLD VENIPUNCTURE: CPT | Performed by: PHYSICIAN ASSISTANT

## 2021-07-01 PROCEDURE — 99222 1ST HOSP IP/OBS MODERATE 55: CPT | Mod: AI | Performed by: PSYCHIATRY & NEUROLOGY

## 2021-07-01 PROCEDURE — 84443 ASSAY THYROID STIM HORMONE: CPT | Performed by: PHYSICIAN ASSISTANT

## 2021-07-01 PROCEDURE — 124N000002 HC R&B MH UMMC

## 2021-07-01 PROCEDURE — 85027 COMPLETE CBC AUTOMATED: CPT | Performed by: PHYSICIAN ASSISTANT

## 2021-07-01 PROCEDURE — 250N000013 HC RX MED GY IP 250 OP 250 PS 637: Performed by: PSYCHIATRY & NEUROLOGY

## 2021-07-01 RX ADMIN — SALMETEROL XINAFOATE 1 PUFF: 50 POWDER, METERED ORAL; RESPIRATORY (INHALATION) at 08:28

## 2021-07-01 RX ADMIN — GUAIFENESIN 600 MG: 600 TABLET, EXTENDED RELEASE ORAL at 08:06

## 2021-07-01 RX ADMIN — LEVOTHYROXINE SODIUM 88 MCG: 88 TABLET ORAL at 08:05

## 2021-07-01 RX ADMIN — BENZTROPINE MESYLATE 1 MG: 1 TABLET ORAL at 08:05

## 2021-07-01 RX ADMIN — DOCUSATE SODIUM 100 MG: 100 CAPSULE, LIQUID FILLED ORAL at 20:21

## 2021-07-01 RX ADMIN — DOCUSATE SODIUM 100 MG: 100 CAPSULE, LIQUID FILLED ORAL at 08:05

## 2021-07-01 RX ADMIN — ASPIRIN 81 MG CHEWABLE TABLET 81 MG: 81 TABLET CHEWABLE at 08:06

## 2021-07-01 RX ADMIN — HALOPERIDOL 10 MG: 10 TABLET ORAL at 08:05

## 2021-07-01 RX ADMIN — CHOLECALCIFEROL CAP 1.25 MG (50000 UNIT) 1250 MCG: 1.25 CAP at 08:09

## 2021-07-01 RX ADMIN — MEMANTINE 5 MG: 5 TABLET ORAL at 08:09

## 2021-07-01 RX ADMIN — HALOPERIDOL 10 MG: 10 TABLET ORAL at 20:21

## 2021-07-01 RX ADMIN — Medication 50 MCG: at 08:06

## 2021-07-01 RX ADMIN — GUAIFENESIN 600 MG: 600 TABLET, EXTENDED RELEASE ORAL at 20:21

## 2021-07-01 RX ADMIN — BENZTROPINE MESYLATE 1 MG: 1 TABLET ORAL at 20:21

## 2021-07-01 RX ADMIN — RISPERIDONE 3 MG: 3 TABLET, ORALLY DISINTEGRATING ORAL at 08:27

## 2021-07-01 RX ADMIN — DIVALPROEX SODIUM 500 MG: 500 TABLET, EXTENDED RELEASE ORAL at 20:21

## 2021-07-01 RX ADMIN — Medication 50 MCG: at 08:07

## 2021-07-01 RX ADMIN — RISPERIDONE 1 MG: 1 TABLET, ORALLY DISINTEGRATING ORAL at 20:22

## 2021-07-01 RX ADMIN — LORAZEPAM 1 MG: 0.5 TABLET ORAL at 16:40

## 2021-07-01 RX ADMIN — HALOPERIDOL 10 MG: 10 TABLET ORAL at 13:29

## 2021-07-01 RX ADMIN — POLYETHYLENE GLYCOL 3350 17 G: 17 POWDER, FOR SOLUTION ORAL at 08:05

## 2021-07-01 RX ADMIN — NICOTINE 7 MG/24 HR DAILY TRANSDERMAL PATCH 1 PATCH: at 08:05

## 2021-07-01 RX ADMIN — RISPERIDONE 3 MG: 3 TABLET, ORALLY DISINTEGRATING ORAL at 20:22

## 2021-07-01 ASSESSMENT — ACTIVITIES OF DAILY LIVING (ADL)
HYGIENE/GROOMING: INDEPENDENT
LAUNDRY: WITH SUPERVISION
ORAL_HYGIENE: INDEPENDENT
DRESS: INDEPENDENT
LAUNDRY: WITH SUPERVISION
DRESS: INDEPENDENT
HYGIENE/GROOMING: INDEPENDENT
ORAL_HYGIENE: INDEPENDENT

## 2021-07-01 NOTE — PROGRESS NOTES
Patient arrived  6/30/2021  8:06 PM to station 30 N from station 30N    EPIC Admitting  DX: Behavior disturbance [F91.9]    Vital signs reviewed related to admission.  /88   Pulse 84   Temp 97.7  F (36.5  C) (Oral)   Ht 1.829 m (6')   Wt 89.1 kg (196 lb 8 oz)   SpO2 99%   BMI 26.65 kg/m  .    Patient arrived on the unit and was somewhat cooperative with the clothing search. Patient came out of the locker room and needed to be redirected back to complete the search. Patient initially was upset about being on the unit and stated that he was going to call his attorneys at [facility] and [facility] to get this resolved. Patient also was upset about not being able to smoke. Patient was able to sit and watch some television and went to bed shortly after. Patient appears confused and unable to sit and do an admission interview. Patient was placed on SIO staffing.     Patient has a medical device ID- in his locker

## 2021-07-01 NOTE — PLAN OF CARE
Pt appears to have slept 6.5 hours this shift.  Pt has remained awake in his room since 0615.  Pt initially reluctant to take HS scheduled meds at start of shift, with prompting pt compliant w/ meds.  SIO 1:1 order in place.  No acute events overnight.  Will continue to monitor and support plan of care.

## 2021-07-01 NOTE — PHARMACY-ADMISSION MEDICATION HISTORY
Please see Admission Medication History note completed on 9/9/20 and Physician Discharge Summary on 6/30/21  under previous encounter at [facility] for information regarding prior to admission medications.    Nicollette McMann, PharmD  [facility]: Ascom *[phone]

## 2021-07-01 NOTE — PLAN OF CARE
Problem: Behavioral Health Plan of Care  Goal: Develops/Participates in [facility] [place] to Support Successful Transition  Intervention: Foster [facility] [place]  Recent Flowsheet Documentation  Taken 7/1/2021 1400 by Tess Vaca RN  Trust Relationship/Rapport:   care explained   reassurance provided   thoughts/feelings   Irritable mood, some disorganized thoughts, tried to go into other rooms , needing redirection. Appears responding, staff said he was responding to someone or something in his room. Punched his hand . Quite , hard to understand, talks in a whisper.  Blunted tense affect. He was medication compliant.
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
## 2021-07-01 NOTE — PROGRESS NOTES
06/30/21 2238   Patient Belongings   Did you bring any home meds/supplements to the hospital?  Yes   Patient Belongings locker   Patient Belongings Put in Hospital Secure Location (Security or Locker, etc.) glasses;clothing;plastic bag   Belongings Search Yes   Clothing Search Yes   Second Staff Kirt GORDON ([facility])     Pt had clothes, cigarettes, a [place] card, [place] Medical Device ID card; these items were placed in pt locker #9.    Pt had a Salmetrol Inhaler given to RN upon admission.    Pt had no cash or credit cards with him upon admission upon admission therefore ZERO items were sent to hospital security.    A               Admission:  I am responsible for any personal items that are not sent to the safe or pharmacy.  [facility] is not responsible for loss, theft or damage of any property in my possession.    Signature:  _________________________________ Date: _______  Time: _____                                              Staff Signature:  ____________________________ Date: ________  Time: _____      2nd Staff person, if patient is unable/unwilling to sign:    Signature: ________________________________ Date: ________  Time: _____     Discharge:  [facility] has returned all of my personal belongings:    Signature: _________________________________ Date: ________  Time: _____                                          Staff Signature:  ____________________________ Date: ________  Time: _____

## 2021-07-01 NOTE — CONSULTS
Circumstances of recent discharge and re-admittance noted. Please refer to recent medicine discharge summary in chart dated 6/30/2021, which was reviewed.    John Juárez is a 54 yo M with PMHx Schizophrenia, hx of TBI, alcohol use disorder, COPD, HLD, Hx of cardiomyopathy that has since resolved (EF 50% 2016), hx of endocarditis s/p bioprosthetic valve replacement 10/2015, Hx of CVA, and Hypothyroidism who was initially admitted to [facility] pm 9/9/2020 for evaluation of aggressive behavior at his group home. Psychiatry evaluated patient and condition stabilized. Unfortunately patient's group home was unwilling to take him back. Patient subsequently had a prolonged hospitalization awaiting new placement and guardianship, under civil commitment through [facility]. Hospital course complicated by increasingly agitated behavior and attempts at elopement.     Diagnoses:    #Major neurocognitive disorder dt hx of TBI and alcohol use disorder  #Schizophrenia  #Under commitment  Had been residing in group home prior to admission.  Brought to hospital for evaluation of aggressive behaviors. Group home now unwilling to take him back. Has had numerous CODE 21s called this stay. Seen by psych, meds adjusted. Is currently under civil commitment and court hold through [facility] until 7/31/21. Please see recent discharge summary for details on 6/30/20201.   -Management per psychiatry     #Vitamin D Deficiency- Vit D level 16. Has been in hospital since Sept 2020. Started on cholecalciferol 67220 units on 6/17. Continue high-dose weekly replacement for 6 weeks total. Following high dose replacement recommend starting vitamin D 2000 international unit(s) daily replacement (start date 8/5/2021).      #Possible Tardive Dyskinesia vs EPSE- Per psych assessment on 4/1/21, noted to have possible tardive dyskinesia vs extrapyramidal side effects of meds.  - At that time, recommended to increase Cogentin to 1mg BID  and add vitamin E 800 international unit(s) daily.     #Hx of CVA - hx of basal ganglia stroke in 2015. No focal neuro deficits aside from cognitive dysfunction as above.   #Hyperlipidemia - Continue PTA ASA 81 mg daily and atorvastatin 80 mg daily.      #COPD - Not O2 dependent. Continue PTA on salmeterol and albuterol PRN. Patient occasionally refusing inhalers, encourage compliance. Mucinex added for intermittent cough at [facility].      #Hypothyroidism- TSH 3.18 on 9/2020. Repeat recently on 5/29/2021 slightly elevated 5.18, with no T4 . Continue PTA levothyroxine 88 mcg daily. Repeat TSH with reflex to T4.      #Hx of infective endocarditis with severe mitral and aortic regurgitation S/P bioprosthetic valve replacement (10/2015)  #Hx of HFrEF, now recovered, not in acute exacerbation  Follows with Dr. Dockery of Heart and Vascular Cardiology, last seen in 1/2020. Echocardiogram in 5/2016 with EF 50%, no evidence for prosthetic mitral and aortic valve dysfunction.   - Follow-up with outpatient Cardiology upon dismissal from the hospital (on every 2 year follow-ups).  - Continue ASA 81 mg daily      #Tobacco use disorder- Using nicotine patch and PRN gum.     #Non-severe malnutrition- Nutrition consulted and following at OSH.     Ordered CMP, CBC, and TSH with reflex.  No further recommendations at this time. Please page the on-call SHREE for any intercurrent medical issues which arise.     ADDENDUM: TSH normal. CBC normal. ALT elevated at 174, with unsatisfactory AST. Per chart review, LFTs last checked in December with  and . T bili and alk phos.  Reviewed with pharmacy, and possible medications causing would be Haldol, depakote, and statin. Discussed with psych who will taper haldol and Depakote. Holding statin, will check LDL.  Will repeat LFTs in AM, and check CK. Medicine will follow up on repeat LFTs, and CK.     Edith Alfaro PA-C  Hospitalist Service  Contact information available via  [facility] Paging/Directory
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
## 2021-07-01 NOTE — PROGRESS NOTES
Care Management Discharge Note    Discharge Date: 06/30/21  Expected Time of Departure: 1930    Discharge Disposition: Inpatient Mental Health    Discharge Services: None    Discharge DME: None    Discharge Transportation: health plan transportation    Private pay costs discussed: Not applicable    PAS Confirmation Code: (n/a)  Patient/family educated on Medicare website which has current facility and service quality ratings: (not applicable)    Education Provided on the Discharge Plan:  Yes to sister  Persons Notified of Discharge Plans: sister Freida  Patient/Family in Agreement with the Plan: (sister Freida Roberts notified of the transfer)    Handoff Referral Completed: Will place a call to  at [facility]    Additional Information:  Aakash Linda Vandana at [facility] Attorney;s Office ([phone]) was notified today by patient's commitment  that he does not oppose the request to add Clozaril to the current [facility]z.  Writer updated Ms Ross of patient's transfer to [facility] at [facility] and asked her to have the court order sent to [facility].    The following people were notified of patient's transfer to [facility] In Patient psychiatry [facility].  Emails sent to patient's Behavioral  under his commitment Kurtis Chapin at [email or web address],  Mr Candelarioug's supervisor, [email or web address], Lynda Mo  at [facility] Attorney's office, [email or web address].   Mr Chapin is on vacation thru July 6th. Ms Rodríguez is covering during his absence.     Contacted patient's sister Freida Roberts at [phone]  She asked if she would be contacted when John is discharged from [facility].  Writer explained she is listed as the family contact and the  will update her at that time.  Explained the purpose of the transfer is to provide ongoing pyschiatry to help John with his delusions.  Also explained a factor in John's discharge will be in  securing a guardian for her brother.    ealth ALS was arranged for the transfer due to patient's unpredictable behavior and potential need fo r4 point restraints.  PCS and face sheet faxed to [facility] before discharge. .            Lulu Contreras, LICSW

## 2021-07-01 NOTE — PLAN OF CARE
BEHAVIORAL TEAM DISCUSSION    Participants:   Dr. Morfin, Tess Vaca RN, Melissa Fang [facility]    Progress:   Pt was transferred from [facility] for treatment on an inpatient psychiatry unit and a trial of Clozaril.  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 He has been trying to elope from [facility].    He is on a 1:1 SIO and shows disorganized behaviors.      Anticipated length of stay:   3 months    Continued Stay Criteria/Rationale:   The pt needs a safe structured place to beging his trial of Clozaril.    Medical/Physical:   COVID is negative and gets checked every 7 days.  His last COVID test result was on 6/25/21 and it was negative.    Hepatitis C  COPD  CHF  CAD  Subarachnoid hemmorage, aortic valve and mitral valve replacement  #Vitamin D Deficiency  #Possible Tardive Dyskinesia vs EPSE-  #Hx of CVA   #Hyperlipidemia   #Hypothyroidism  #Non-severe malnutrition  #Tobacco use disorder  #Hx of infective endocarditis with severe mitral and aortic regurgitation S/P bioprosthetic valve replacement (10/2015)  #Hx of HFrEF, now recovered, not in acute exacerbation          Precautions:   Behavioral Orders   Procedures     Assault precautions     Code 1 - Restrict to Unit     Elopement precautions     Routine Programming     As clinically indicated     Single Room     Status 15     Every 15 minutes.     Status Individual Observation     Patient SIO status reviewed with team/RN.  Please also refer to RN/team documentation for add'l detail.    -SIO staff to monitor following which have contributed to patient being on SIO:  Patient with history of aggression in hospital prior to admission. Patient with multiple elopement attempts while in hospital.     -Possible interventions SIO staff could use to support patient's treatment progress:  Redirection.     -When following observed, team will review discontinuation of  SIO:  Patient is able to exhibit safe behaviors on the unit.     Order Specific Question:   CONTINUOUS 24 hours / day     Answer:   5 feet     Order Specific Question:   Indications for SIO     Answer:   Assault risk     Plan:   multidisciplinary evaluation  Medication management  Start Clozaril  Locate secure placement  Coordinate recommitment and [place] court proceedings for July 8  On SIO, will try to get pt off this.  Coordinate efforts in re: to  Guardianship and rep payee  Seen by internal medicine with follow up on physical health needs    Rationale for change in precautions or plan: initial review

## 2021-07-01 NOTE — PROGRESS NOTES
Discharge    Patient discharged to [place] [facility] from [facility] via [facility] transport @ 1935.    Belongings sent with transport.    Writer called [facility] ([phone]) to notify of pt leaving.

## 2021-07-02 LAB
ALBUMIN SERPL-MCNC: 3.1 G/DL (ref 3.4–5)
ALP SERPL-CCNC: 88 U/L (ref 40–150)
ALT SERPL W P-5'-P-CCNC: 133 U/L (ref 0–70)
AST SERPL W P-5'-P-CCNC: 217 U/L (ref 0–45)
BASOPHILS # BLD AUTO: 0.1 10E9/L (ref 0–0.2)
BASOPHILS NFR BLD AUTO: 1.2 %
BILIRUB DIRECT SERPL-MCNC: 0.2 MG/DL (ref 0–0.2)
BILIRUB SERPL-MCNC: 0.5 MG/DL (ref 0.2–1.3)
CHOLEST SERPL-MCNC: 91 MG/DL
CK SERPL-CCNC: 190 U/L (ref 30–300)
DIFFERENTIAL METHOD BLD: ABNORMAL
EOSINOPHIL # BLD AUTO: 1 10E9/L (ref 0–0.7)
EOSINOPHIL NFR BLD AUTO: 17.6 %
HDLC SERPL-MCNC: 27 MG/DL
IMM GRANULOCYTES # BLD: 0 10E9/L (ref 0–0.4)
IMM GRANULOCYTES NFR BLD: 0.3 %
LDLC SERPL CALC-MCNC: 41 MG/DL
LYMPHOCYTES # BLD AUTO: 1.6 10E9/L (ref 0.8–5.3)
LYMPHOCYTES NFR BLD AUTO: 27.4 %
MONOCYTES # BLD AUTO: 0.6 10E9/L (ref 0–1.3)
MONOCYTES NFR BLD AUTO: 10 %
NEUTROPHILS # BLD AUTO: 2.6 10E9/L (ref 1.6–8.3)
NEUTROPHILS NFR BLD AUTO: 43.5 %
NONHDLC SERPL-MCNC: 64 MG/DL
NRBC # BLD AUTO: 0 10*3/UL
NRBC BLD AUTO-RTO: 0 /100
PROT SERPL-MCNC: 7.6 G/DL (ref 6.8–8.8)
TRIGL SERPL-MCNC: 116 MG/DL
WBC # BLD AUTO: 5.9 10E9/L (ref 4–11)

## 2021-07-02 PROCEDURE — 99207 PR CDG-MDM COMPONENT: MEETS HIGH - UP CODED: CPT | Performed by: PSYCHIATRY & NEUROLOGY

## 2021-07-02 PROCEDURE — 80076 HEPATIC FUNCTION PANEL: CPT | Performed by: PHYSICIAN ASSISTANT

## 2021-07-02 PROCEDURE — 124N000002 HC R&B MH UMMC

## 2021-07-02 PROCEDURE — 85004 AUTOMATED DIFF WBC COUNT: CPT | Performed by: PSYCHIATRY & NEUROLOGY

## 2021-07-02 PROCEDURE — 250N000013 HC RX MED GY IP 250 OP 250 PS 637: Performed by: PSYCHIATRY & NEUROLOGY

## 2021-07-02 PROCEDURE — 36415 COLL VENOUS BLD VENIPUNCTURE: CPT | Performed by: PSYCHIATRY & NEUROLOGY

## 2021-07-02 PROCEDURE — 99233 SBSQ HOSP IP/OBS HIGH 50: CPT | Performed by: PSYCHIATRY & NEUROLOGY

## 2021-07-02 PROCEDURE — 85048 AUTOMATED LEUKOCYTE COUNT: CPT | Performed by: PSYCHIATRY & NEUROLOGY

## 2021-07-02 PROCEDURE — 82550 ASSAY OF CK (CPK): CPT | Performed by: PHYSICIAN ASSISTANT

## 2021-07-02 PROCEDURE — 36415 COLL VENOUS BLD VENIPUNCTURE: CPT | Performed by: PHYSICIAN ASSISTANT

## 2021-07-02 PROCEDURE — 80061 LIPID PANEL: CPT | Performed by: PHYSICIAN ASSISTANT

## 2021-07-02 RX ORDER — CLOZAPINE 25 MG/1
25 TABLET ORAL AT BEDTIME
Status: DISCONTINUED | OUTPATIENT
Start: 2021-07-02 | End: 2021-07-02

## 2021-07-02 RX ORDER — DIVALPROEX SODIUM 250 MG/1
250 TABLET, EXTENDED RELEASE ORAL AT BEDTIME
Status: DISCONTINUED | OUTPATIENT
Start: 2021-07-02 | End: 2021-07-05

## 2021-07-02 RX ORDER — HALOPERIDOL 5 MG/1
5 TABLET ORAL 3 TIMES DAILY
Status: DISCONTINUED | OUTPATIENT
Start: 2021-07-02 | End: 2021-07-04

## 2021-07-02 RX ORDER — CLOZAPINE 25 MG/1
25 TABLET ORAL AT BEDTIME
Status: DISCONTINUED | OUTPATIENT
Start: 2021-07-02 | End: 2021-07-04

## 2021-07-02 RX ADMIN — BENZTROPINE MESYLATE 1 MG: 1 TABLET ORAL at 21:09

## 2021-07-02 RX ADMIN — ASPIRIN 81 MG CHEWABLE TABLET 81 MG: 81 TABLET CHEWABLE at 08:23

## 2021-07-02 RX ADMIN — SALMETEROL XINAFOATE 1 PUFF: 50 POWDER, METERED ORAL; RESPIRATORY (INHALATION) at 21:10

## 2021-07-02 RX ADMIN — HALOPERIDOL 5 MG: 5 TABLET ORAL at 21:09

## 2021-07-02 RX ADMIN — MEMANTINE 5 MG: 5 TABLET ORAL at 08:22

## 2021-07-02 RX ADMIN — GUAIFENESIN 600 MG: 600 TABLET, EXTENDED RELEASE ORAL at 21:10

## 2021-07-02 RX ADMIN — SALMETEROL XINAFOATE 1 PUFF: 50 POWDER, METERED ORAL; RESPIRATORY (INHALATION) at 08:22

## 2021-07-02 RX ADMIN — DOCUSATE SODIUM 100 MG: 100 CAPSULE, LIQUID FILLED ORAL at 08:23

## 2021-07-02 RX ADMIN — DIVALPROEX SODIUM 250 MG: 250 TABLET, EXTENDED RELEASE ORAL at 21:10

## 2021-07-02 RX ADMIN — POLYETHYLENE GLYCOL 3350 17 G: 17 POWDER, FOR SOLUTION ORAL at 08:22

## 2021-07-02 RX ADMIN — HALOPERIDOL 5 MG: 5 TABLET ORAL at 08:23

## 2021-07-02 RX ADMIN — Medication 50 MCG: at 08:23

## 2021-07-02 RX ADMIN — HALOPERIDOL 5 MG: 5 TABLET ORAL at 13:37

## 2021-07-02 RX ADMIN — RISPERIDONE 1 MG: 1 TABLET, ORALLY DISINTEGRATING ORAL at 21:09

## 2021-07-02 RX ADMIN — GUAIFENESIN 600 MG: 600 TABLET, EXTENDED RELEASE ORAL at 08:23

## 2021-07-02 RX ADMIN — BENZTROPINE MESYLATE 1 MG: 1 TABLET ORAL at 08:22

## 2021-07-02 RX ADMIN — RISPERIDONE 3 MG: 3 TABLET, ORALLY DISINTEGRATING ORAL at 21:10

## 2021-07-02 RX ADMIN — CLOZAPINE 25 MG: 25 TABLET ORAL at 21:09

## 2021-07-02 RX ADMIN — LEVOTHYROXINE SODIUM 88 MCG: 88 TABLET ORAL at 08:23

## 2021-07-02 RX ADMIN — RISPERIDONE 3 MG: 3 TABLET, ORALLY DISINTEGRATING ORAL at 08:23

## 2021-07-02 RX ADMIN — DOCUSATE SODIUM 100 MG: 100 CAPSULE, LIQUID FILLED ORAL at 21:09

## 2021-07-02 ASSESSMENT — ACTIVITIES OF DAILY LIVING (ADL)
ORAL_HYGIENE: INDEPENDENT
HYGIENE/GROOMING: INDEPENDENT;PROMPTS
HYGIENE/GROOMING: INDEPENDENT
DRESS: INDEPENDENT
ORAL_HYGIENE: INDEPENDENT;PROMPTS
DRESS: SCRUBS (BEHAVIORAL HEALTH);INDEPENDENT
LAUNDRY: WITH SUPERVISION

## 2021-07-02 NOTE — PLAN OF CARE
Night Shift Summary (7/1/21 into 07/02/21)    Pt in bed sleeping at start of shift. Breathing quiet and unlabored. Fasting after midnight for scheduled AM labs. Appears to have slept 6.75 hours.    Pt continues on 1:1 SIO with 5 foot ft staff space distance. Assault and Elopement precautions in addition to Single Room order, with no related events occurring this shift.     Will continue to monitor and assess.       Problem: Cognitive Impairment (Psychotic Signs/Symptoms)  Goal: Optimal Cognitive Function (Psychotic Signs/Symptoms)  Outcome: No Change   Remains confused.     Problem: Behavioral Health Plan of Care  Goal: Absence of New-Onset Illness or Injury  Outcome: Improving  Remains safe on the unit.      Problem: Sleep Disturbance  Goal: Adequate Sleep/Rest  Outcome: Improving   Slept ? 6 hours.

## 2021-07-02 NOTE — PLAN OF CARE
"Pt has a flat blunted affect and attended part of OT group after lunch. Pt has disorganized thinking. Pt is alert to self only. Pt thought the year was 2031, and didn't know where he was or why he was in the  hospital. Pt requested to leave and go to his sisters house stating \"I live with my sister and my meds are there\". Pt needed re direction trying to use a peers bathroom thinking it was his bathroom. Pt will continue on a SIO for intrusiveness and confusion.  Pt rates anxiety at 0/10 and depression 0/10. Pt rates pain at 0/10. Pt reports no SI/HI and contracts for safety. Pt was visible on unit but withdrawn to self. Pt not engaged. Pt denies hallucinations but noted possibly responding to internal stimuli. Pt was noted talking to himself. Continue current POC.    "

## 2021-07-02 NOTE — PLAN OF CARE
Patient has spent majority of the shift in the milieu. Continues to be on SIO staffing. Patient while in the milieu began to punch into his palm and scream out. Was given prn ativan which appeared to be helpful. Denies suicidal ideation and self injurious thoughts. Denies anxiety. Denies depression. Patient appears to be responding and is distracted. Patient told writer told that 2  are going to pick him up and take him to see his sister, then walked down the cruz. Med compliant. Ate dinner.     Patient evaluation continues. Assessed mood,anxiety,thoughts and behavior.     Patient gradually progressing towards goals.    Patient is encouraged to participate in groups and assisted to develop healthy coping skills.     VS reviewed: /77   Pulse 92   Temp 97.5  F (36.4  C) (Oral)   Ht 1.829 m (6')   Wt 89.1 kg (196 lb 8 oz)   SpO2 100%   BMI 26.65 kg/m      Length of stay: 1    Refer to daily team meeting notes for individualized plan of care. Nursing will continue to assess.

## 2021-07-02 NOTE — PLAN OF CARE
Nonattendance OT Note  Problem: OT General Care Plan  Goal: OT Goal 1  Description: OT Goal 1  Will attend OT groups and participate actively in all OT opportunities. Will assess and set goals.    Pt has not attended scheduled occupational therapy sessions. He has been on 1:1 and he and staff came in to the group room after group had just started. He sat down and joined the group activity that focused on cognitive word completion and matching. He was able to engage in 2 rounds of play. Affect was blunt, volume of voice was quiet and response was brief. He left after a couple of minutes, reported he was done. Encourage attendance and participation.

## 2021-07-02 NOTE — PROGRESS NOTES
[facility], [place]   Psychiatric Progress Note        Interim History:   The patient's care was discussed with the treatment team during the daily team meeting and/or staff's chart notes were reviewed.  Staff report patient has been disorganized. Did attempt to enter other patients rooms. May have disrobed. Yanez amended to include Clozaril.     The patient says that he slept well. Reports good appetite. Denies SI or HI. Denies AH or VH. Discussed Clozaril and patient agreeable but may not comprehend.          Medications:       aspirin  81 mg Oral Daily     [Held by provider] atorvastatin  80 mg Oral At Bedtime     benztropine  1 mg Oral BID     cholecalciferol  1,250 mcg Oral Q7 Days     cloZAPine  25 mg Oral At Bedtime     divalproex sodium extended-release  250 mg Oral At Bedtime     docusate sodium  100 mg Oral BID     guaiFENesin  600 mg Oral BID     haloperidol  5 mg Oral TID     levothyroxine  88 mcg Oral Daily     memantine  5 mg Oral Daily     nicotine  1 patch Transdermal Daily     nicotine   Transdermal Q8H     polyethylene glycol  17 g Oral Daily     risperiDONE  1 mg Sublingual Daily     risperiDONE  3 mg Sublingual BID     salmeterol  1 puff Inhalation BID     vitamin D3  50 mcg Oral Daily          Allergies:     Allergies   Allergen Reactions     Ibuprofen      Latex           Labs:     Recent Results (from the past 24 hour(s))   Hepatic panel    Collection Time: 07/02/21  7:17 AM   Result Value Ref Range    Bilirubin Direct 0.2 0.0 - 0.2 mg/dL    Bilirubin Total 0.5 0.2 - 1.3 mg/dL    Albumin 3.1 (L) 3.4 - 5.0 g/dL    Protein Total 7.6 6.8 - 8.8 g/dL    Alkaline Phosphatase 88 40 - 150 U/L     (H) 0 - 70 U/L     (H) 0 - 45 U/L   Lipid panel reflex to direct LDL    Collection Time: 07/02/21  7:17 AM   Result Value Ref Range    Cholesterol 91 <200 mg/dL    Triglycerides 116 <150 mg/dL    HDL Cholesterol 27 (L) >39 mg/dL    LDL Cholesterol Calculated 41 <100  mg/dL    Non HDL Cholesterol 64 <130 mg/dL   CK total    Collection Time: 07/02/21  7:17 AM   Result Value Ref Range    CK Total 190 30 - 300 U/L          Psychiatric Examination:     /76   Pulse 75   Temp 97.6  F (36.4  C) (Oral)   Resp 16   Ht 1.829 m (6')   Wt 89.1 kg (196 lb 8 oz)   SpO2 99%   BMI 26.65 kg/m    Weight is 196 lbs 8 oz  Body mass index is 26.65 kg/m .  Orthostatic Vitals       Most Recent      Sitting Orthostatic /76 07/02 0812    Sitting Orthostatic Pulse (bpm) 75 07/02 0812    Standing Orthostatic BP 95/58 07/02 0812    Standing Orthostatic Pulse (bpm) 74 07/02 0812            Appearance: awake, alert  Attitude:  cooperative  Eye Contact:  poor   Mood:  good  Affect:  intensity is flat  Speech:  paucity of speech  Psychomotor Behavior:  physical retardation  Thought Process:  evidence of thought blocking present  Associations:  no loose associations  Thought Content:  no evidence of suicidal ideation or homicidal ideation and obsessions present  Insight:  limited  Judgement:  limited  Oriented to:  person only  Attention Span and Concentration:  fair  Recent and Remote Memory:  poor    Clinical Global Impressions  First:  Considering your total clinical experience with this particular patient population, how severe are the patient's symptoms at this time?: 7 (07/01/21 0630)  Compared to the patient's condition at the START of treatment, this patient's condition is: 4 (07/01/21 0630)  Most recent:  Considering your total clinical experience with this particular patient population, how severe are the patient's symptoms at this time?: 7 (07/01/21 0630)  Compared to the patient's condition at the START of treatment, this patient's condition is: 4 (07/01/21 0630)         Precautions:     Behavioral Orders   Procedures     Assault precautions     Code 1 - Restrict to Unit     Elopement precautions     Routine Programming     As clinically indicated     Single Room     Status 15      Every 15 minutes.     Status Individual Observation     Patient SIO status reviewed with team/RN.  Please also refer to RN/team documentation for add'l detail.    -SIO staff to monitor following which have contributed to patient being on SIO:  Patient with history of aggression in hospital prior to admission. Patient with multiple elopement attempts while in hospital.     -Possible interventions SIO staff could use to support patient's treatment progress:  Redirection.     -When following observed, team will review discontinuation of SIO:  Patient is able to exhibit safe behaviors on the unit.     Order Specific Question:   CONTINUOUS 24 hours / day     Answer:   5 feet     Order Specific Question:   Indications for SIO     Answer:   Assault risk          Diagnoses:     1.  Schizophrenia, unspecified  2.  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  Alcohol use disorder in remission.   4.  Stimulant use disorder, in remission.           Assessment & Plan:      1) Start Clozaril 25mg at bedtime. Will titrate to response. Continue Risperdal 3mg BID and 1mg Qday. Will likely taper after stabilizing on Clozaril.   2) Decrease Haldol to 5mg TID. Continue Haldol PRN.   3) Decrease VPA to 250mg due to elevated LFTs. May retitrate if needed. .   4) Continue commitment, Yanez, pursuit of guardianship. Amended Yanez to include Clozaril.         Disposition Plan   Reason for ongoing admission: is unable to care for self due to severe psychosis or mariusz  Discharge location: TBD  Discharge Medications: not ordered  Follow-up Appointments: not scheduled  Legal Status: full commitment    Entered by: Wilton Morfin on 7/2/2021 at 1:23 PM

## 2021-07-02 NOTE — PLAN OF CARE
Assessment/Intervention/Current Symtoms and Care Coordination  -Chart review  -Pre round meeting with team  -Rounded with team, addressed patient needs/concerns  -Post round meeting with team  Current Symptoms include the following: Psychosis    I received a call from [facility] Care Coordinator Luba Wolf @ [phone].  We discussed updates to this case. She reports that she had been following along with the [employer]  as pt was on her call out list. She was formally assigned to this case yesterday.  She said that the [employer]  has been thinking the pt was on the  list for an [facility] Home. She was surprised to hear that he has not been on the list at [facility] since September 2020.    Discharge Plan or Goal  Pending stabilization & development of a safe discharge plan.  Considerations include: Structured Placement    Barriers to Discharge  Patient requires further psychiatric stabilization due to current symptomology    Referral Status  group home    Legal Status  Patient is under MI commitment in [employer] [facility]  Pt is committed as MI to [facility] and the Commissioner.  Pt has a re-commitment hearing on July 8 and the venue will be changed to [facility].    I called [employer] Probate Court and requested the original Yanez.  File No. [id number] Dated January 31, 2020  The Head of the [facility], or any other facility providing care and treatment to Respondent pursuant to this Court's Order for Commitment is authorized to administer to Respondent Zyprexa ( olanzapine), Haldol (haloperidil), Risperdol (risperdone), and/or Invega (paliperidone), in medically-indicated dosages and in all formulations, for the duration of the current commitment, but no beyond one year without additional review bu the Court.    I received the amendment yesterday.  Court Order File No: [id number]  Dated July 1, 2021  Order Amending Order Authorizing Use  of Neuroleptic Medication  It is hereby Ordered:  That the Order Authorizing Use of Neuroleptic Medication previously ordered herein on January 31, 2020 which was continued on July 10, 2020, is amended to allow use of the neuroleptic medication Clozaril and to remove Zyprexa.

## 2021-07-03 PROCEDURE — 250N000013 HC RX MED GY IP 250 OP 250 PS 637: Performed by: PSYCHIATRY & NEUROLOGY

## 2021-07-03 PROCEDURE — 124N000002 HC R&B MH UMMC

## 2021-07-03 RX ADMIN — NICOTINE 7 MG/24 HR DAILY TRANSDERMAL PATCH 1 PATCH: at 08:43

## 2021-07-03 RX ADMIN — BENZTROPINE MESYLATE 1 MG: 1 TABLET ORAL at 20:07

## 2021-07-03 RX ADMIN — SALMETEROL XINAFOATE 1 PUFF: 50 POWDER, METERED ORAL; RESPIRATORY (INHALATION) at 08:48

## 2021-07-03 RX ADMIN — GUAIFENESIN 600 MG: 600 TABLET, EXTENDED RELEASE ORAL at 08:42

## 2021-07-03 RX ADMIN — ASPIRIN 81 MG CHEWABLE TABLET 81 MG: 81 TABLET CHEWABLE at 08:43

## 2021-07-03 RX ADMIN — RISPERIDONE 3 MG: 3 TABLET, ORALLY DISINTEGRATING ORAL at 20:07

## 2021-07-03 RX ADMIN — DOCUSATE SODIUM 100 MG: 100 CAPSULE, LIQUID FILLED ORAL at 08:43

## 2021-07-03 RX ADMIN — POLYETHYLENE GLYCOL 3350 17 G: 17 POWDER, FOR SOLUTION ORAL at 08:41

## 2021-07-03 RX ADMIN — Medication 50 MCG: at 08:41

## 2021-07-03 RX ADMIN — RISPERIDONE 3 MG: 3 TABLET, ORALLY DISINTEGRATING ORAL at 08:42

## 2021-07-03 RX ADMIN — CLOZAPINE 25 MG: 25 TABLET ORAL at 20:08

## 2021-07-03 RX ADMIN — SALMETEROL XINAFOATE 1 PUFF: 50 POWDER, METERED ORAL; RESPIRATORY (INHALATION) at 20:08

## 2021-07-03 RX ADMIN — HALOPERIDOL 5 MG: 5 TABLET ORAL at 08:41

## 2021-07-03 RX ADMIN — LEVOTHYROXINE SODIUM 88 MCG: 88 TABLET ORAL at 08:43

## 2021-07-03 RX ADMIN — DIVALPROEX SODIUM 250 MG: 250 TABLET, EXTENDED RELEASE ORAL at 20:07

## 2021-07-03 RX ADMIN — GUAIFENESIN 600 MG: 600 TABLET, EXTENDED RELEASE ORAL at 20:07

## 2021-07-03 RX ADMIN — MEMANTINE 5 MG: 5 TABLET ORAL at 08:42

## 2021-07-03 RX ADMIN — BENZTROPINE MESYLATE 1 MG: 1 TABLET ORAL at 08:42

## 2021-07-03 RX ADMIN — HALOPERIDOL 5 MG: 5 TABLET ORAL at 13:50

## 2021-07-03 RX ADMIN — RISPERIDONE 1 MG: 1 TABLET, ORALLY DISINTEGRATING ORAL at 20:08

## 2021-07-03 RX ADMIN — HALOPERIDOL 5 MG: 5 TABLET ORAL at 20:07

## 2021-07-03 RX ADMIN — DOCUSATE SODIUM 100 MG: 100 CAPSULE, LIQUID FILLED ORAL at 20:07

## 2021-07-03 ASSESSMENT — ACTIVITIES OF DAILY LIVING (ADL)
ORAL_HYGIENE: INDEPENDENT
HYGIENE/GROOMING: INDEPENDENT;PROMPTS
LAUNDRY: WITH SUPERVISION
DRESS: PROMPTS
ORAL_HYGIENE: INDEPENDENT;PROMPTS
DRESS: INDEPENDENT
LAUNDRY: WITH SUPERVISION
HYGIENE/GROOMING: INDEPENDENT

## 2021-07-03 NOTE — PROGRESS NOTES
Patient appears to have slept a total of 6.5 hours this  Night shift.  Appears comfortable.  No prns or snacks given or requested.  Remains on SIO staffing.

## 2021-07-03 NOTE — PLAN OF CARE
Patient remains disorganizes, restless, delusional. Fills a cup of coffee sits for one minute gets up and moves without coffee. Doing this through out the day. Came up to this staff complaining about his sister is getting his checks, and has spent over one millon dollars of his.  Poor ADL'S , VSS, appetite good. Denies any safety issues.    Blunted, tense affect, poor tracking.

## 2021-07-03 NOTE — PROGRESS NOTES
Brief Medicine Note    Please see initial consult note on 7/1/2021. Medicine following up on repeat LFTs,  and . Normal T bili and alk phos. Normal CK.  Per nursing, patient denies any abdominal pain and N/V. Again per chart review, LFTs slowly rising over the last several months (last LFTs last checked in December with  and . T bili and alk phos normal). Reviewed with pharmacy, and possible medications causing would be Haldol, depakote, and statin. Discussed with psych who will taper haldol and Depakote. Holding statin, (total cholesterol 91, with LDL 41).     Plan to repeat in 2 days on 7/4, and if still rising will obtain abdominal US and viral hepatitis panel.     Medicine will follow up on repeat LFTs. No further recommendations at this time. Please page the on-call SHREE for any intercurrent medical issues which arise.     Edith Alfaro PA-C  Hospitalist Service  Contact information available via [facility] Paging/Directory

## 2021-07-03 NOTE — PLAN OF CARE
Patient has spent majority of the shift in the milieu. Continues to be restless and anxious. Ate dinner. Med compliant. Denies SI and SIB. Appears to be responding to internal stimuli, talking to himself at times.     Patient evaluation continues. Assessed mood,anxiety,thoughts and behavior.     Patient gradually progressing towards goals.    Patient is encouraged to participate in groups and assisted to develop healthy coping skills.     VS reviewed: /83   Pulse 74   Temp 98.2  F (36.8  C) (Oral)   Resp 16   Ht 1.829 m (6')   Wt 89.1 kg (196 lb 8 oz)   SpO2 99%   BMI 26.65 kg/m      Length of stay: 2    Refer to daily team meeting notes for individualized plan of care. Nursing will continue to assess.

## 2021-07-04 LAB
ALBUMIN SERPL-MCNC: 3.4 G/DL (ref 3.4–5)
ALP SERPL-CCNC: 94 U/L (ref 40–150)
ALT SERPL W P-5'-P-CCNC: 147 U/L (ref 0–70)
AST SERPL W P-5'-P-CCNC: 233 U/L (ref 0–45)
BILIRUB DIRECT SERPL-MCNC: 0.2 MG/DL (ref 0–0.2)
BILIRUB SERPL-MCNC: 0.5 MG/DL (ref 0.2–1.3)
PROT SERPL-MCNC: 8 G/DL (ref 6.8–8.8)

## 2021-07-04 PROCEDURE — 80076 HEPATIC FUNCTION PANEL: CPT | Performed by: PHYSICIAN ASSISTANT

## 2021-07-04 PROCEDURE — 250N000013 HC RX MED GY IP 250 OP 250 PS 637: Performed by: PSYCHIATRY & NEUROLOGY

## 2021-07-04 PROCEDURE — 124N000002 HC R&B MH UMMC

## 2021-07-04 PROCEDURE — 99207 PR CDG-MDM COMPONENT: MEETS MODERATE - UP CODED: CPT | Performed by: PHYSICIAN ASSISTANT

## 2021-07-04 PROCEDURE — 36415 COLL VENOUS BLD VENIPUNCTURE: CPT | Performed by: PHYSICIAN ASSISTANT

## 2021-07-04 PROCEDURE — 99232 SBSQ HOSP IP/OBS MODERATE 35: CPT | Performed by: PHYSICIAN ASSISTANT

## 2021-07-04 RX ORDER — CLOZAPINE 25 MG/1
25 TABLET ORAL 2 TIMES DAILY
Status: DISCONTINUED | OUTPATIENT
Start: 2021-07-04 | End: 2021-07-05

## 2021-07-04 RX ADMIN — SALMETEROL XINAFOATE 1 PUFF: 50 POWDER, METERED ORAL; RESPIRATORY (INHALATION) at 22:45

## 2021-07-04 RX ADMIN — DOCUSATE SODIUM 100 MG: 100 CAPSULE, LIQUID FILLED ORAL at 22:44

## 2021-07-04 RX ADMIN — GUAIFENESIN 600 MG: 600 TABLET, EXTENDED RELEASE ORAL at 22:43

## 2021-07-04 RX ADMIN — RISPERIDONE 3 MG: 3 TABLET, ORALLY DISINTEGRATING ORAL at 22:43

## 2021-07-04 RX ADMIN — LEVOTHYROXINE SODIUM 88 MCG: 88 TABLET ORAL at 08:31

## 2021-07-04 RX ADMIN — CLOZAPINE 25 MG: 25 TABLET ORAL at 08:31

## 2021-07-04 RX ADMIN — DOCUSATE SODIUM 100 MG: 100 CAPSULE, LIQUID FILLED ORAL at 08:31

## 2021-07-04 RX ADMIN — RISPERIDONE 3 MG: 3 TABLET, ORALLY DISINTEGRATING ORAL at 08:31

## 2021-07-04 RX ADMIN — LORAZEPAM 0.5 MG: 0.5 TABLET ORAL at 16:40

## 2021-07-04 RX ADMIN — BENZTROPINE MESYLATE 1 MG: 1 TABLET ORAL at 08:31

## 2021-07-04 RX ADMIN — CLOZAPINE 25 MG: 25 TABLET ORAL at 22:43

## 2021-07-04 RX ADMIN — MEMANTINE 5 MG: 5 TABLET ORAL at 08:31

## 2021-07-04 RX ADMIN — DIVALPROEX SODIUM 250 MG: 250 TABLET, EXTENDED RELEASE ORAL at 22:43

## 2021-07-04 RX ADMIN — RISPERIDONE 1 MG: 1 TABLET, ORALLY DISINTEGRATING ORAL at 22:44

## 2021-07-04 RX ADMIN — POLYETHYLENE GLYCOL 3350 17 G: 17 POWDER, FOR SOLUTION ORAL at 08:32

## 2021-07-04 RX ADMIN — ASPIRIN 81 MG CHEWABLE TABLET 81 MG: 81 TABLET CHEWABLE at 08:31

## 2021-07-04 RX ADMIN — SALMETEROL XINAFOATE 1 PUFF: 50 POWDER, METERED ORAL; RESPIRATORY (INHALATION) at 08:43

## 2021-07-04 RX ADMIN — GUAIFENESIN 600 MG: 600 TABLET, EXTENDED RELEASE ORAL at 08:31

## 2021-07-04 RX ADMIN — BENZTROPINE MESYLATE 1 MG: 1 TABLET ORAL at 22:44

## 2021-07-04 RX ADMIN — RISPERIDONE 1 MG: 1 TABLET, ORALLY DISINTEGRATING ORAL at 11:23

## 2021-07-04 RX ADMIN — Medication 50 MCG: at 08:31

## 2021-07-04 ASSESSMENT — ACTIVITIES OF DAILY LIVING (ADL)
DRESS: INDEPENDENT;PROMPTS
LAUNDRY: UNABLE TO COMPLETE
LAUNDRY: WITH SUPERVISION
ORAL_HYGIENE: INDEPENDENT
HYGIENE/GROOMING: INDEPENDENT;PROMPTS
HYGIENE/GROOMING: INDEPENDENT
ORAL_HYGIENE: INDEPENDENT;PROMPTS
DRESS: INDEPENDENT

## 2021-07-04 ASSESSMENT — MIFFLIN-ST. JEOR: SCORE: 1737.99

## 2021-07-04 NOTE — PROGRESS NOTES
Brief Internal Medicine Note    Please see initial consult note on 7/1/2021, and follow up note on 7/3/2021. Medicine following up on repeat LFTs, which are trending up.      Patient denies any abdominal pain, N/V. Denies any hx of hepatitis, viral hepatitis, or liver disease. Eating and drinking normally.         /82   Pulse 107   Temp 97.5  F (36.4  C) (Tympanic)   Resp 16   Ht 1.829 m (6')   Wt 87.5 kg (192 lb 14.4 oz)   SpO2 96%   BMI 26.16 kg/m    General: Alert and awake. NAD. Disheveled.   Resp: non-labored breathing.   Abd: Soft. Non-distended. Non-tender in all quadrants without any rebound tenderness or guarding. No hepatomegaly.       A/P:  #Transaminitis - slowly rising LFTs since December. Asymptomatic. Reviewed with pharmacy, and possible medications causing would be Haldol, depakote, and statin. Discussed with psych who will taper haldol and Depakote. Holding statin, (total cholesterol 91, with LDL 41). Repeat LFTs continue to rise slightly.   -Abdominal US tomorrow (NPO after midnight)  -Hepatitis B surface ab and agn and Hep C ab   -Repeat LFTs in 3 days     Medicine will follow up on Abdominal US, viral hepatitis studies and repeat LFTs.  No further recommendations at this time. Please page the on-call SHREE for any intercurrent medical issues which arise.     Edith Alfaro PA-C  Hospitalist Service  Contact information available via [facility] Paging/Directory

## 2021-07-04 NOTE — PLAN OF CARE
Pt appears to have slept 7 hours this shift.  No concerns reported or noted.  Pt continues on SIO 1:1 for safety.  Pt on assault and elopement precautions.  Will continue to monitor and support plan of care.

## 2021-07-04 NOTE — PLAN OF CARE
Patient has spent majority of the shift in the milieu. Continues to be restless and disorganized. Asked several times tonight about if his sister was coming. Patient denies suicidal ideation and self injurious thoughts. Denies anxiety and depression. Denies auditory and visual hallucinations, but talks to himself at times. Ate dinner and snack. Med compliant. Continues to be on SIO staffing for confusion and elopement risk.     Patient evaluation continues. Assessed mood,anxiety,thoughts and behavior.     Patient gradually progressing towards goals.    Patient is encouraged to participate in groups and assisted to develop healthy coping skills.     VS reviewed: /74   Pulse 97   Temp 98  F (36.7  C) (Oral)   Resp 16   Ht 1.829 m (6')   Wt 89.1 kg (196 lb 8 oz)   SpO2 98%   BMI 26.65 kg/m      Length of stay: 3    Refer to daily team meeting notes for individualized plan of care. Nursing will continue to assess.

## 2021-07-05 ENCOUNTER — APPOINTMENT (OUTPATIENT)
Dept: ULTRASOUND IMAGING | Facility: CLINIC | Age: 56
DRG: 885 | End: 2021-07-05
Attending: PHYSICIAN ASSISTANT
Payer: COMMERCIAL

## 2021-07-05 LAB
HBV SURFACE AB SERPL IA-ACNC: 0 M[IU]/ML
HBV SURFACE AG SERPL QL IA: NONREACTIVE
HCV AB SERPL QL IA: REACTIVE

## 2021-07-05 PROCEDURE — 93975 VASCULAR STUDY: CPT

## 2021-07-05 PROCEDURE — 87340 HEPATITIS B SURFACE AG IA: CPT | Performed by: PHYSICIAN ASSISTANT

## 2021-07-05 PROCEDURE — 93975 VASCULAR STUDY: CPT | Mod: 26 | Performed by: RADIOLOGY

## 2021-07-05 PROCEDURE — 99232 SBSQ HOSP IP/OBS MODERATE 35: CPT | Performed by: PSYCHIATRY & NEUROLOGY

## 2021-07-05 PROCEDURE — 124N000002 HC R&B MH UMMC

## 2021-07-05 PROCEDURE — 86706 HEP B SURFACE ANTIBODY: CPT | Performed by: PHYSICIAN ASSISTANT

## 2021-07-05 PROCEDURE — 250N000013 HC RX MED GY IP 250 OP 250 PS 637: Performed by: PSYCHIATRY & NEUROLOGY

## 2021-07-05 PROCEDURE — 86803 HEPATITIS C AB TEST: CPT | Performed by: PHYSICIAN ASSISTANT

## 2021-07-05 PROCEDURE — 36415 COLL VENOUS BLD VENIPUNCTURE: CPT | Performed by: PHYSICIAN ASSISTANT

## 2021-07-05 RX ORDER — CLOZAPINE 25 MG/1
50 TABLET ORAL 2 TIMES DAILY
Status: DISCONTINUED | OUTPATIENT
Start: 2021-07-05 | End: 2021-07-06

## 2021-07-05 RX ADMIN — CLOZAPINE 50 MG: 25 TABLET ORAL at 20:02

## 2021-07-05 RX ADMIN — GUAIFENESIN 600 MG: 600 TABLET, EXTENDED RELEASE ORAL at 08:22

## 2021-07-05 RX ADMIN — MEMANTINE 5 MG: 5 TABLET ORAL at 08:22

## 2021-07-05 RX ADMIN — BENZTROPINE MESYLATE 1 MG: 1 TABLET ORAL at 20:02

## 2021-07-05 RX ADMIN — Medication 50 MCG: at 08:22

## 2021-07-05 RX ADMIN — DOCUSATE SODIUM 100 MG: 100 CAPSULE, LIQUID FILLED ORAL at 08:22

## 2021-07-05 RX ADMIN — SALMETEROL XINAFOATE 1 PUFF: 50 POWDER, METERED ORAL; RESPIRATORY (INHALATION) at 20:03

## 2021-07-05 RX ADMIN — SALMETEROL XINAFOATE 1 PUFF: 50 POWDER, METERED ORAL; RESPIRATORY (INHALATION) at 08:24

## 2021-07-05 RX ADMIN — LEVOTHYROXINE SODIUM 88 MCG: 88 TABLET ORAL at 08:22

## 2021-07-05 RX ADMIN — ASPIRIN 81 MG CHEWABLE TABLET 81 MG: 81 TABLET CHEWABLE at 08:22

## 2021-07-05 RX ADMIN — BENZTROPINE MESYLATE 1 MG: 1 TABLET ORAL at 08:22

## 2021-07-05 RX ADMIN — POLYETHYLENE GLYCOL 3350 17 G: 17 POWDER, FOR SOLUTION ORAL at 08:23

## 2021-07-05 RX ADMIN — CLOZAPINE 25 MG: 25 TABLET ORAL at 08:22

## 2021-07-05 RX ADMIN — RISPERIDONE 1 MG: 1 TABLET, ORALLY DISINTEGRATING ORAL at 20:01

## 2021-07-05 RX ADMIN — DOCUSATE SODIUM 100 MG: 100 CAPSULE, LIQUID FILLED ORAL at 20:02

## 2021-07-05 RX ADMIN — GUAIFENESIN 600 MG: 600 TABLET, EXTENDED RELEASE ORAL at 20:02

## 2021-07-05 RX ADMIN — RISPERIDONE 3 MG: 3 TABLET, ORALLY DISINTEGRATING ORAL at 08:22

## 2021-07-05 RX ADMIN — RISPERIDONE 3 MG: 3 TABLET, ORALLY DISINTEGRATING ORAL at 20:02

## 2021-07-05 NOTE — PLAN OF CARE
Patient spent the early part of the shift in the milieu. Patient has been restless and tense. After receiving his prn he appeared to be more calm. Denies suicidal ideation and self injurious thoughts. Denies auditory and visual hallucinations. Denies anxiety and depression. Ate dinner. Med compliant.     Patient evaluation continues. Assessed mood,anxiety,thoughts and behavior.     Patient gradually progressing towards goals.    Patient is encouraged to participate in groups and assisted to develop healthy coping skills.     VS reviewed: /77   Pulse 104   Temp 98.1  F (36.7  C) (Oral)   Resp 16   Ht 1.829 m (6')   Wt 87.5 kg (192 lb 14.4 oz)   SpO2 96%   BMI 26.16 kg/m      Length of stay: 4    Refer to daily team meeting notes for individualized plan of care. Nursing will continue to assess.

## 2021-07-05 NOTE — PLAN OF CARE
Problem: Sleep Disturbance  Goal: Adequate Sleep/Rest  Outcome: Improving  Patient is on SIO 1:1 for elopement and assault precautions. She appears to have slept for about 6.75 hours. No problem observed or reported.

## 2021-07-05 NOTE — PLAN OF CARE
Assessment/Intervention/Current Symtoms and Care Coordination  -Chart review  -Pre round meeting with team  -Rounded with team, addressed patient needs/concerns  -Post round meeting with team  Current Symptoms include the following: Psychosis    There will be a care conference on Thursday, July 8 @ 3:15PM with internal and external stakeholders to discuss the issues of guardianship of this patient.    I have asked for the pt to be placed on the state programs list.    I have left a vm for the targeted  to call when he returns from vacation tomorrow to do care coordination.    There is a recommitment hearing on Thursday, July 8, 2021 at 1PM.    I called the  line to check the status of his case. They migue till processing the faxes.      Discharge Plan or Goal  Pending stabilization & development of a safe discharge plan.  Considerations include: Return to group home    Barriers to Discharge  Patient requires further psychiatric stabilization due to current symptomology    Referral Status  Psychiatry    Legal Status  Patient is under MI commitment in [facility]

## 2021-07-05 NOTE — PLAN OF CARE
Problem: Suicidal Behavior  Goal: Suicidal Behavior is Absent or Managed  Outcome: No Change    Pt has been in and out of his room.  Pt continues on SIO.  Pt presents with a flat affect, mood is irritable and tensed.  Pt ate his meals and was med complaint.  No medication side effects reported or noted.  Will continue to assess.

## 2021-07-05 NOTE — PROGRESS NOTES
"[facility], [place]   Psychiatric Progress Note        Interim History:   The patient's care was discussed with the treatment team during the daily team meeting and/or staff's chart notes were reviewed.  Staff report patient has been confused. Meeting on Thursday to determine guardianship.     The patient says that he is okay. Wants to leave. Says \"this is bullshit\" when discussed that he needs to stabilize further. Oriented to person only. Denies SI or HI. Denies AH or VH. Sleeping and eating well per patient.          Medications:       aspirin  81 mg Oral Daily     [Held by provider] atorvastatin  80 mg Oral At Bedtime     benztropine  1 mg Oral BID     cholecalciferol  1,250 mcg Oral Q7 Days     cloZAPine  50 mg Oral BID     docusate sodium  100 mg Oral BID     guaiFENesin  600 mg Oral BID     levothyroxine  88 mcg Oral Daily     memantine  5 mg Oral Daily     nicotine  1 patch Transdermal Daily     nicotine   Transdermal Q8H     polyethylene glycol  17 g Oral Daily     risperiDONE  1 mg Sublingual Daily     risperiDONE  3 mg Sublingual BID     salmeterol  1 puff Inhalation BID     vitamin D3  50 mcg Oral Daily          Allergies:     Allergies   Allergen Reactions     Ibuprofen      Latex           Labs:     No results found for this or any previous visit (from the past 24 hour(s)).       Psychiatric Examination:     /77   Pulse 80   Temp 97.8  F (36.6  C) (Oral)   Resp 16   Ht 1.829 m (6')   Wt 87.5 kg (192 lb 14.4 oz)   SpO2 96%   BMI 26.16 kg/m    Weight is 192 lbs 14.4 oz  Body mass index is 26.16 kg/m .  Orthostatic Vitals       Most Recent      Sitting Orthostatic /76 07/02 0812    Sitting Orthostatic Pulse (bpm) 75 07/02 0812    Standing Orthostatic BP 95/58 07/02 0812    Standing Orthostatic Pulse (bpm) 74 07/02 0812            Appearance: awake, alert  Attitude:  somewhat cooperative  Eye Contact:  fair  Mood:  good  Affect:  intensity is " heightened  Speech:  paucity of speech  Psychomotor Behavior:  no evidence of tardive dyskinesia, dystonia, or tics  Thought Process:  evidence of thought blocking present  Associations:  no loose associations  Thought Content:  no evidence of suicidal ideation or homicidal ideation and obsessions present  Insight:  limited  Judgement:  limited  Oriented to:  person only  Attention Span and Concentration:  fair  Recent and Remote Memory:  poor    Clinical Global Impressions  First:  Considering your total clinical experience with this particular patient population, how severe are the patient's symptoms at this time?: 7 (07/01/21 0630)  Compared to the patient's condition at the START of treatment, this patient's condition is: 4 (07/01/21 0630)  Most recent:  Considering your total clinical experience with this particular patient population, how severe are the patient's symptoms at this time?: 7 (07/01/21 0630)  Compared to the patient's condition at the START of treatment, this patient's condition is: 4 (07/01/21 0630)         Precautions:     Behavioral Orders   Procedures     Assault precautions     Code 2     Elopement precautions     Routine Programming     As clinically indicated     Single Room     Status 15     Every 15 minutes.     Status Individual Observation     Patient SIO status reviewed with team/RN.  Please also refer to RN/team documentation for add'l detail.    -SIO staff to monitor following which have contributed to patient being on SIO:  Patient with history of aggression in hospital prior to admission. Patient with multiple elopement attempts while in hospital.     -Possible interventions SIO staff could use to support patient's treatment progress:  Redirection.     -When following observed, team will review discontinuation of SIO:  Patient is able to exhibit safe behaviors on the unit.     Order Specific Question:   CONTINUOUS 24 hours / day     Answer:   5 feet     Order Specific Question:    Indications for SIO     Answer:   Assault risk          Diagnoses:     1.  Schizophrenia, unspecified  2.  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  Alcohol use disorder in remission.   4.  Stimulant use disorder, in remission.           Assessment & Plan:      1) Increase Clozaril to 50mg BID. Will titrate to response. Continue Risperdal 3mg BID and 1mg Qday. Will likely taper after stabilizing on Clozaril.   2) Stopped Haldol due to elevated LFTs.   3) Stopped VPA due to elevated LFTs.    4) Continue commitment, Yanez, pursuit of guardianship. Amended Yanez to include Clozaril.         Disposition Plan   Reason for ongoing admission: is unable to care for self due to severe psychosis or mariusz  Discharge location: TBD  Discharge Medications: not ordered  Follow-up Appointments: not scheduled  Legal Status: full commitment    Entered by: Wilton Morfin on 7/5/2021 at 9:28 AM

## 2021-07-06 PROCEDURE — 99232 SBSQ HOSP IP/OBS MODERATE 35: CPT | Performed by: PSYCHIATRY & NEUROLOGY

## 2021-07-06 PROCEDURE — 250N000013 HC RX MED GY IP 250 OP 250 PS 637: Performed by: PSYCHIATRY & NEUROLOGY

## 2021-07-06 PROCEDURE — 124N000002 HC R&B MH UMMC

## 2021-07-06 RX ORDER — CLOZAPINE 25 MG/1
75 TABLET ORAL 2 TIMES DAILY
Status: DISCONTINUED | OUTPATIENT
Start: 2021-07-06 | End: 2021-07-08

## 2021-07-06 RX ADMIN — MEMANTINE 5 MG: 5 TABLET ORAL at 08:32

## 2021-07-06 RX ADMIN — RISPERIDONE 3 MG: 3 TABLET, ORALLY DISINTEGRATING ORAL at 20:43

## 2021-07-06 RX ADMIN — CLOZAPINE 50 MG: 25 TABLET ORAL at 08:31

## 2021-07-06 RX ADMIN — GUAIFENESIN 600 MG: 600 TABLET, EXTENDED RELEASE ORAL at 20:43

## 2021-07-06 RX ADMIN — Medication 50 MCG: at 08:31

## 2021-07-06 RX ADMIN — DOCUSATE SODIUM 100 MG: 100 CAPSULE, LIQUID FILLED ORAL at 20:43

## 2021-07-06 RX ADMIN — BENZTROPINE MESYLATE 1 MG: 1 TABLET ORAL at 20:43

## 2021-07-06 RX ADMIN — LEVOTHYROXINE SODIUM 88 MCG: 88 TABLET ORAL at 08:31

## 2021-07-06 RX ADMIN — DOCUSATE SODIUM 100 MG: 100 CAPSULE, LIQUID FILLED ORAL at 08:31

## 2021-07-06 RX ADMIN — ASPIRIN 81 MG CHEWABLE TABLET 81 MG: 81 TABLET CHEWABLE at 08:32

## 2021-07-06 RX ADMIN — GUAIFENESIN 600 MG: 600 TABLET, EXTENDED RELEASE ORAL at 08:31

## 2021-07-06 RX ADMIN — BENZTROPINE MESYLATE 1 MG: 1 TABLET ORAL at 08:32

## 2021-07-06 RX ADMIN — RISPERIDONE 3 MG: 3 TABLET, ORALLY DISINTEGRATING ORAL at 08:32

## 2021-07-06 RX ADMIN — POLYETHYLENE GLYCOL 3350 17 G: 17 POWDER, FOR SOLUTION ORAL at 08:31

## 2021-07-06 RX ADMIN — SALMETEROL XINAFOATE 1 PUFF: 50 POWDER, METERED ORAL; RESPIRATORY (INHALATION) at 20:44

## 2021-07-06 RX ADMIN — CLOZAPINE 75 MG: 25 TABLET ORAL at 20:43

## 2021-07-06 RX ADMIN — SALMETEROL XINAFOATE 1 PUFF: 50 POWDER, METERED ORAL; RESPIRATORY (INHALATION) at 08:33

## 2021-07-06 ASSESSMENT — ACTIVITIES OF DAILY LIVING (ADL)
DRESS: INDEPENDENT
DRESS: INDEPENDENT
HYGIENE/GROOMING: INDEPENDENT
HYGIENE/GROOMING: INDEPENDENT;PROMPTS
LAUNDRY: WITH SUPERVISION
ORAL_HYGIENE: INDEPENDENT;PROMPTS
LAUNDRY: WITH SUPERVISION
ORAL_HYGIENE: INDEPENDENT

## 2021-07-06 NOTE — PROGRESS NOTES
"[facility], [place]   Psychiatric Progress Note        Interim History:   The patient's care was discussed with the treatment team during the daily team meeting and/or staff's chart notes were reviewed.  Staff report patient has been confused at times. May do okay off SIO.     The patient says that he is good. Asks, \"Why can't I go home?\" Reports good sleep and appetite. Denies SI or HI. Denies AH or VH. Is only oriented to person.          Medications:       aspirin  81 mg Oral Daily     [Held by provider] atorvastatin  80 mg Oral At Bedtime     benztropine  1 mg Oral BID     cholecalciferol  1,250 mcg Oral Q7 Days     cloZAPine  75 mg Oral BID     docusate sodium  100 mg Oral BID     guaiFENesin  600 mg Oral BID     levothyroxine  88 mcg Oral Daily     memantine  5 mg Oral Daily     nicotine  1 patch Transdermal Daily     nicotine   Transdermal Q8H     polyethylene glycol  17 g Oral Daily     risperiDONE  3 mg Sublingual BID     salmeterol  1 puff Inhalation BID     vitamin D3  50 mcg Oral Daily          Allergies:     Allergies   Allergen Reactions     Ibuprofen      Latex           Labs:     No results found for this or any previous visit (from the past 24 hour(s)).       Psychiatric Examination:     /77 (BP Location: Left arm)   Pulse 87   Temp 98.1  F (36.7  C) (Oral)   Resp 16   Ht 1.829 m (6')   Wt 87.5 kg (192 lb 14.4 oz)   SpO2 97%   BMI 26.16 kg/m    Weight is 192 lbs 14.4 oz  Body mass index is 26.16 kg/m .  Orthostatic Vitals       Most Recent      Sitting Orthostatic /76 07/02 0812    Sitting Orthostatic Pulse (bpm) 75 07/02 0812    Standing Orthostatic BP 95/58 07/02 0812    Standing Orthostatic Pulse (bpm) 74 07/02 0812            Appearance: awake, alert  Attitude:  somewhat cooperative  Eye Contact:  fair  Mood:  good  Affect:  intensity is heightened  Speech:  paucity of speech  Psychomotor Behavior:  no evidence of tardive dyskinesia, dystonia, or " tics  Thought Process:  evidence of thought blocking present  Associations:  no loose associations  Thought Content:  no evidence of suicidal ideation or homicidal ideation and obsessions present  Insight:  limited  Judgement:  limited  Oriented to:  person only  Attention Span and Concentration:  fair  Recent and Remote Memory:  poor    Clinical Global Impressions  First:  Considering your total clinical experience with this particular patient population, how severe are the patient's symptoms at this time?: 7 (07/01/21 0630)  Compared to the patient's condition at the START of treatment, this patient's condition is: 4 (07/01/21 0630)  Most recent:  Considering your total clinical experience with this particular patient population, how severe are the patient's symptoms at this time?: 7 (07/01/21 0630)  Compared to the patient's condition at the START of treatment, this patient's condition is: 4 (07/01/21 0630)         Precautions:     Behavioral Orders   Procedures     Assault precautions     Code 2     Elopement precautions     Routine Programming     As clinically indicated     Single Room     Status 15     Every 15 minutes.          Diagnoses:     1.  Schizophrenia, unspecified  2.  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  Alcohol use disorder in remission.   4.  Stimulant use disorder, in remission.           Assessment & Plan:      1) Increase Clozaril to 75mg BID. Will titrate to response. Continue Risperdal 3mg BID and stop 1mg Qday. Continue to cross-taper.   2) Stopped Haldol due to elevated LFTs.   3) Stopped VPA due to elevated LFTs.    4) Continue commitment, Yanez, pursuit of guardianship. Amended Yanez to include Clozaril.   5) Meeting on Thursday, July 8 for guardianship questions.   6) Stop SIO.       Disposition Plan   Reason for ongoing admission: is unable to care for self due to severe psychosis or mariusz  Discharge location: TBD  Discharge Medications:  not ordered  Follow-up Appointments: not scheduled  Legal Status: full commitment    Entered by: Wilton Morfin on 7/6/2021 at 1:10 PM

## 2021-07-06 NOTE — PLAN OF CARE
Assessment/Intervention/Current Symtoms and Care Coordination  -Chart review  -Pre round meeting with team  -Rounded with team, addressed patient needs/concerns  -Post round meeting with team  Current Symptoms include the following: Psychosis      I received a return call from the [facility]. He says that the pt has been on the [facility] list as being coordinated by Monica Flores at [facility].      I called  and they said the pt has been placed on the [facility] waiting list. They report that Monica Flores works for [facility].  She is managing the pt on the MSOC state program list.  said that the pt can also be on the [facility] list.    I contacted Monica Flores at [facility] and asked what the options are for the pt.  I did not hear back.      [facility] called back. This pt is no longer a client there.      Discharge Plan or Goal  Pending stabilization & development of a safe discharge plan.  Considerations include: Return to group home    Barriers to Discharge  Patient requires further psychiatric stabilization due to current symptomology    Referral Status  [facility]    Legal Status  Patient is under MI commitment in [facility]

## 2021-07-06 NOTE — PLAN OF CARE
Pt appears to have slept 6.75 hours this shift, remains on SIO.    Pt continues on elopement, assault and single room precautions.     No concerns noted or reported at this time, will continue to monitor and support plan of care.

## 2021-07-06 NOTE — PLAN OF CARE
Laying in bed most of am.  He did get up took meds, ate breakfast then went back to bed. Poor ADL's, this staff tried to encourage shower but he declined.  Blunted affect poor eye contact hair hanging  in front of his face. Wants to go home repetitive requests.  Sleeping ok, appetite good VSS

## 2021-07-06 NOTE — PLAN OF CARE
"  Problem: Psychotic Symptoms  Goal: Psychotic Symptoms  Description: Signs and symptoms of listed problems will be absent or manageable.  Outcome: No Change    Patient is oriented to self only.  Pt denies auditory and visual hallucination.  Pt denies SI/HI/SIB, pt appears to be responding.  Pt denies all mental health symptoms.  Pt states \"Am leaving tomorrow\".  Pt was med complaint.  No medication side effects reported or noted.         "

## 2021-07-07 LAB
ALBUMIN SERPL-MCNC: 3.2 G/DL (ref 3.4–5)
ALP SERPL-CCNC: 82 U/L (ref 40–150)
ALT SERPL W P-5'-P-CCNC: 129 U/L (ref 0–70)
AST SERPL W P-5'-P-CCNC: 182 U/L (ref 0–45)
BILIRUB DIRECT SERPL-MCNC: 0.1 MG/DL (ref 0–0.2)
BILIRUB SERPL-MCNC: 0.5 MG/DL (ref 0.2–1.3)
PROT SERPL-MCNC: 8 G/DL (ref 6.8–8.8)

## 2021-07-07 PROCEDURE — 36415 COLL VENOUS BLD VENIPUNCTURE: CPT | Performed by: PHYSICIAN ASSISTANT

## 2021-07-07 PROCEDURE — 80076 HEPATIC FUNCTION PANEL: CPT | Performed by: PHYSICIAN ASSISTANT

## 2021-07-07 PROCEDURE — 124N000002 HC R&B MH UMMC

## 2021-07-07 PROCEDURE — 250N000013 HC RX MED GY IP 250 OP 250 PS 637: Performed by: PSYCHIATRY & NEUROLOGY

## 2021-07-07 RX ADMIN — CLOZAPINE 75 MG: 25 TABLET ORAL at 08:56

## 2021-07-07 RX ADMIN — SALMETEROL XINAFOATE 1 PUFF: 50 POWDER, METERED ORAL; RESPIRATORY (INHALATION) at 21:57

## 2021-07-07 RX ADMIN — CLOZAPINE 75 MG: 25 TABLET ORAL at 21:55

## 2021-07-07 RX ADMIN — MEMANTINE 5 MG: 5 TABLET ORAL at 08:56

## 2021-07-07 RX ADMIN — Medication 50 MCG: at 08:56

## 2021-07-07 RX ADMIN — LEVOTHYROXINE SODIUM 88 MCG: 88 TABLET ORAL at 08:56

## 2021-07-07 RX ADMIN — BENZTROPINE MESYLATE 1 MG: 1 TABLET ORAL at 08:56

## 2021-07-07 RX ADMIN — RISPERIDONE 3 MG: 3 TABLET, ORALLY DISINTEGRATING ORAL at 21:56

## 2021-07-07 RX ADMIN — BENZTROPINE MESYLATE 1 MG: 1 TABLET ORAL at 21:56

## 2021-07-07 RX ADMIN — DOCUSATE SODIUM 100 MG: 100 CAPSULE, LIQUID FILLED ORAL at 21:56

## 2021-07-07 RX ADMIN — DOCUSATE SODIUM 100 MG: 100 CAPSULE, LIQUID FILLED ORAL at 08:56

## 2021-07-07 RX ADMIN — POLYETHYLENE GLYCOL 3350 17 G: 17 POWDER, FOR SOLUTION ORAL at 08:57

## 2021-07-07 RX ADMIN — ASPIRIN 81 MG CHEWABLE TABLET 81 MG: 81 TABLET CHEWABLE at 08:56

## 2021-07-07 RX ADMIN — GUAIFENESIN 600 MG: 600 TABLET, EXTENDED RELEASE ORAL at 21:56

## 2021-07-07 RX ADMIN — RISPERIDONE 3 MG: 3 TABLET, ORALLY DISINTEGRATING ORAL at 08:56

## 2021-07-07 RX ADMIN — SALMETEROL XINAFOATE 1 PUFF: 50 POWDER, METERED ORAL; RESPIRATORY (INHALATION) at 08:58

## 2021-07-07 RX ADMIN — GUAIFENESIN 600 MG: 600 TABLET, EXTENDED RELEASE ORAL at 08:56

## 2021-07-07 ASSESSMENT — ACTIVITIES OF DAILY LIVING (ADL)
DRESS: INDEPENDENT
LAUNDRY: WITH SUPERVISION
HYGIENE/GROOMING: INDEPENDENT
ORAL_HYGIENE: INDEPENDENT

## 2021-07-07 NOTE — PLAN OF CARE
Patient has spent majority of the shift in the milieu. Has been going in and out of his room throughout the evening. Denies suicidal ideation and self injurious thoughts. Denies auditory and visual hallucinations. Denies anxiety. Stated that he was a little depressed. Affect is flat. Patient when getting his medication told writer that his sister was coming in 30 minutes and that he needed to take his medications first. Appears distracted. Ate dinner. Med compliant.      Patient evaluation continues. Assessed mood,anxiety,thoughts and behavior.     Patient gradually progressing towards goals.    Patient is encouraged to participate in groups and assisted to develop healthy coping skills.     VS reviewed: /85 (BP Location: Right arm)   Pulse 109   Temp 98.5  F (36.9  C) (Oral)   Resp 16   Ht 1.829 m (6')   Wt 87.5 kg (192 lb 14.4 oz)   SpO2 97%   BMI 26.16 kg/m      Length of stay: 6    Refer to daily team meeting notes for individualized plan of care. Nursing will continue to assess.

## 2021-07-07 NOTE — PLAN OF CARE
"  Problem: Psychotic Symptoms  Goal: Psychotic Symptoms  Description: Signs and symptoms of listed problems will be absent or manageable.  Outcome: No Change    Pt's affect is flat blunted.  Pt denies auditory and visual hallucinations.  Pt is visible in the milieu, comes out for meals.  Pt keeps to himself, no interaction with peers.  Pt talking of discharge and states \"I think am discharging today\".  Pt took his medication as prescribed.  Will continue to assess.       "

## 2021-07-07 NOTE — PLAN OF CARE
Assessment/Intervention/Current Symtoms and Care Coordination  -Chart review  -Pre round meeting with team  -Rounded with team, addressed patient needs/concerns  -Post round meeting with team  Current Symptoms include the following: Psychosis    Pt's  - Anup Fang called - and wanted to make sure the pt was able to participate in the court proceedings. He also said the pt should sit close to the microphone since his voice is low.      Discharge Plan or Goal  Pending stabilization & development of a safe discharge plan.  Considerations include: state operated facilities    Barriers to Discharge  Patient requires further psychiatric stabilization due to current symptomology    Referral Status  state operated facilites    Legal Status  Patient is under MI commitment in [facility]

## 2021-07-07 NOTE — PLAN OF CARE
Plan of care   Problem: Sleep Disturbance  Goal: Adequate Sleep/Rest  Outcome: Improving   Pt had a good shift; pt has been sleeping with regular unlabored respiration; pt appeared to have slept for 7 hrs this shift; is on elopement, and assault precautions. No PRN was administered; No safety concern to report; will continue to monitor and assess.

## 2021-07-08 PROCEDURE — 250N000013 HC RX MED GY IP 250 OP 250 PS 637: Performed by: PSYCHIATRY & NEUROLOGY

## 2021-07-08 PROCEDURE — 87635 SARS-COV-2 COVID-19 AMP PRB: CPT | Performed by: PSYCHIATRY & NEUROLOGY

## 2021-07-08 PROCEDURE — 99233 SBSQ HOSP IP/OBS HIGH 50: CPT | Performed by: PSYCHIATRY & NEUROLOGY

## 2021-07-08 PROCEDURE — 124N000002 HC R&B MH UMMC

## 2021-07-08 RX ORDER — RISPERIDONE 1 MG/1
2 TABLET, ORALLY DISINTEGRATING ORAL DAILY
Status: DISCONTINUED | OUTPATIENT
Start: 2021-07-09 | End: 2021-07-09

## 2021-07-08 RX ORDER — RISPERIDONE 3 MG/1
3 TABLET, ORALLY DISINTEGRATING ORAL AT BEDTIME
Status: DISCONTINUED | OUTPATIENT
Start: 2021-07-09 | End: 2021-07-12

## 2021-07-08 RX ORDER — CLOZAPINE 25 MG/1
100 TABLET ORAL 2 TIMES DAILY
Status: COMPLETED | OUTPATIENT
Start: 2021-07-08 | End: 2021-07-09

## 2021-07-08 RX ADMIN — DOCUSATE SODIUM 100 MG: 100 CAPSULE, LIQUID FILLED ORAL at 20:27

## 2021-07-08 RX ADMIN — LORAZEPAM 0.5 MG: 0.5 TABLET ORAL at 18:15

## 2021-07-08 RX ADMIN — SALMETEROL XINAFOATE 1 PUFF: 50 POWDER, METERED ORAL; RESPIRATORY (INHALATION) at 20:29

## 2021-07-08 RX ADMIN — Medication 50 MCG: at 08:34

## 2021-07-08 RX ADMIN — RISPERIDONE 3 MG: 3 TABLET, ORALLY DISINTEGRATING ORAL at 20:27

## 2021-07-08 RX ADMIN — DOCUSATE SODIUM 100 MG: 100 CAPSULE, LIQUID FILLED ORAL at 08:34

## 2021-07-08 RX ADMIN — BENZTROPINE MESYLATE 1 MG: 1 TABLET ORAL at 20:27

## 2021-07-08 RX ADMIN — SALMETEROL XINAFOATE 1 PUFF: 50 POWDER, METERED ORAL; RESPIRATORY (INHALATION) at 08:35

## 2021-07-08 RX ADMIN — CLOZAPINE 100 MG: 25 TABLET ORAL at 20:27

## 2021-07-08 RX ADMIN — NICOTINE 7 MG/24 HR DAILY TRANSDERMAL PATCH 1 PATCH: at 08:34

## 2021-07-08 RX ADMIN — RISPERIDONE 3 MG: 3 TABLET, ORALLY DISINTEGRATING ORAL at 08:33

## 2021-07-08 RX ADMIN — RISPERIDONE 1 MG: 1 TABLET, ORALLY DISINTEGRATING ORAL at 18:13

## 2021-07-08 RX ADMIN — GUAIFENESIN 600 MG: 600 TABLET, EXTENDED RELEASE ORAL at 08:34

## 2021-07-08 RX ADMIN — CLOZAPINE 75 MG: 25 TABLET ORAL at 08:33

## 2021-07-08 RX ADMIN — GUAIFENESIN 600 MG: 600 TABLET, EXTENDED RELEASE ORAL at 20:27

## 2021-07-08 RX ADMIN — CHOLECALCIFEROL CAP 1.25 MG (50000 UNIT) 1250 MCG: 1.25 CAP at 09:11

## 2021-07-08 RX ADMIN — ASPIRIN 81 MG CHEWABLE TABLET 81 MG: 81 TABLET CHEWABLE at 08:33

## 2021-07-08 RX ADMIN — POLYETHYLENE GLYCOL 3350 17 G: 17 POWDER, FOR SOLUTION ORAL at 08:34

## 2021-07-08 RX ADMIN — MEMANTINE 5 MG: 5 TABLET ORAL at 08:33

## 2021-07-08 RX ADMIN — BENZTROPINE MESYLATE 1 MG: 1 TABLET ORAL at 08:34

## 2021-07-08 RX ADMIN — LEVOTHYROXINE SODIUM 88 MCG: 88 TABLET ORAL at 08:34

## 2021-07-08 ASSESSMENT — ACTIVITIES OF DAILY LIVING (ADL)
LAUNDRY: WITH SUPERVISION
HYGIENE/GROOMING: INDEPENDENT
ORAL_HYGIENE: INDEPENDENT
DRESS: INDEPENDENT
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
## 2021-07-08 NOTE — PLAN OF CARE
Patient has spent majority of the shift in the milieu. Patient denies suicidal ideation and self injurious thoughts. Denies auditory and visual hallucinations. Denies depression and anxiety. Affect is flat and guarded. Med compliant. Ate dinner. Continues to appear a bit confused and distracted. Adl's could use improvement.    Patient evaluation continues. Assessed mood,anxiety,thoughts and behavior.     Patient gradually progressing towards goals.    Patient is encouraged to participate in groups and assisted to develop healthy coping skills.     VS reviewed: /87 (BP Location: Left arm)   Pulse 121   Temp 97.9  F (36.6  C) (Oral)   Resp 16   Ht 1.829 m (6')   Wt 87.5 kg (192 lb 14.4 oz)   SpO2 95%   BMI 26.16 kg/m      Length of stay: 7    Refer to daily team meeting notes for individualized plan of care. Nursing will continue to assess.

## 2021-07-08 NOTE — PLAN OF CARE
BEHAVIORAL TEAM DISCUSSION    Participants:   Dr. Dobbins, Melissa WEN, Karla WEN, Gloria Calix RN, Kala Chawla RN      Progress:   Pt was transferred from [facility] to [facility] [address] on 6/30/21.  Pt is experiencing psychosis and had a cognitive disorder.  He is under a commitment with a Yanez.  He was not able to be placed due to guardianship issues.  Pt has been managing well off the 1:1.  Pt is not bale to benefit from group programming.  Hospital has not been able to find a guardian for the pt.    Anticipated length of stay:   3 months    Continued Stay Criteria/Rationale:     Medical/Physical:   Pt has a host of medical issues mostly stemming from alochol use.LFTs are trending down.  He has no teeth and speech is difficult.    Precautions:   Behavioral Orders   Procedures     Assault precautions     Code 2     Elopement precautions     Routine Programming     As clinically indicated     Single Room     Status 15     Every 15 minutes.     Plan:   Continue with Clozaril  Weekly WBC  Care Conference today to discuss placement barriers  Monitor and treat medical problems        Rationale for change in precautions or plan: no changes

## 2021-07-08 NOTE — PLAN OF CARE
Assessment/Intervention/Current Symtoms and Care Coordination  -Chart review  -Pre round meeting with team  -Rounded with team, addressed patient needs/concerns  -Post round meeting with team  Current Symptoms include the following: Psychosis    Care Conference to  held today to discuss barriers to placement with [facility] Attorney and other [facility] staff.  Plan is to try to place pt in locked nursing home.      Discharge Plan or Goal  Pending stabilization & development of a safe discharge plan.  Considerations include: Return to group home    Barriers to Discharge  Patient requires further psychiatric stabilization due to current symptomology    Referral Status  Skilled home nursing    Legal Status  Patient is under MI commitment in [facility]
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
## 2021-07-08 NOTE — PLAN OF CARE
"Patient napped in morning coming out of room for breakfast (ate 100%) and was cooperative-med-compliant with a flat affect. After lunch (ate 100%) patient in lounge for TV. Patient waits patiently, very still and will speak when spoken to stating \"I am fine- I just want to go home.\" and at this time denies depression and denies anxiety. (Patient is difficult to hear r/t quiet-hoarse voice). Patient independently ambulates and observed having difficulty going from lying to sitting position (did without assist). Patient declined groups. NO behaviors.    Patient had court online this shift and does not recall event.    Patient denies SI/SIB.    /79   Pulse 91   Temp 98.1  F (36.7  C) (Oral)   Resp 18   Ht 1.829 m (6')   Wt 87.5 kg (192 lb 14.4 oz)   SpO2 96%   BMI 26.16 kg/m  . No c/o pain.    Nursing will continue to monitor.  "

## 2021-07-09 LAB
BASOPHILS # BLD AUTO: 0.1 10E9/L (ref 0–0.2)
BASOPHILS NFR BLD AUTO: 1.2 %
DIFFERENTIAL METHOD BLD: ABNORMAL
EOSINOPHIL # BLD AUTO: 0.8 10E9/L (ref 0–0.7)
EOSINOPHIL NFR BLD AUTO: 12.6 %
ERYTHROCYTE [DISTWIDTH] IN BLOOD BY AUTOMATED COUNT: 14 % (ref 10–15)
HCT VFR BLD AUTO: 47.2 % (ref 40–53)
HGB BLD-MCNC: 16.5 G/DL (ref 13.3–17.7)
IMM GRANULOCYTES # BLD: 0 10E9/L (ref 0–0.4)
IMM GRANULOCYTES NFR BLD: 0.3 %
LYMPHOCYTES # BLD AUTO: 2.1 10E9/L (ref 0.8–5.3)
LYMPHOCYTES NFR BLD AUTO: 31.6 %
MCH RBC QN AUTO: 32.9 PG (ref 26.5–33)
MCHC RBC AUTO-ENTMCNC: 35 G/DL (ref 31.5–36.5)
MCV RBC AUTO: 94 FL (ref 78–100)
MONOCYTES # BLD AUTO: 0.7 10E9/L (ref 0–1.3)
MONOCYTES NFR BLD AUTO: 10.3 %
NEUTROPHILS # BLD AUTO: 2.9 10E9/L (ref 1.6–8.3)
NEUTROPHILS NFR BLD AUTO: 44 %
NRBC # BLD AUTO: 0 10*3/UL
NRBC BLD AUTO-RTO: 0 /100
PLATELET # BLD AUTO: 169 10E9/L (ref 150–450)
RBC # BLD AUTO: 5.02 10E12/L (ref 4.4–5.9)
WBC # BLD AUTO: 6.5 10E9/L (ref 4–11)

## 2021-07-09 PROCEDURE — 250N000013 HC RX MED GY IP 250 OP 250 PS 637: Performed by: PSYCHIATRY & NEUROLOGY

## 2021-07-09 PROCEDURE — 124N000002 HC R&B MH UMMC

## 2021-07-09 PROCEDURE — 36415 COLL VENOUS BLD VENIPUNCTURE: CPT | Performed by: PSYCHIATRY & NEUROLOGY

## 2021-07-09 PROCEDURE — 99233 SBSQ HOSP IP/OBS HIGH 50: CPT | Performed by: PSYCHIATRY & NEUROLOGY

## 2021-07-09 PROCEDURE — 85025 COMPLETE CBC W/AUTO DIFF WBC: CPT | Performed by: PSYCHIATRY & NEUROLOGY

## 2021-07-09 RX ORDER — CLOZAPINE 100 MG/1
200 TABLET ORAL AT BEDTIME
Status: DISCONTINUED | OUTPATIENT
Start: 2021-07-10 | End: 2021-07-19

## 2021-07-09 RX ORDER — RISPERIDONE 1 MG/1
1 TABLET, ORALLY DISINTEGRATING ORAL DAILY
Status: COMPLETED | OUTPATIENT
Start: 2021-07-10 | End: 2021-07-10

## 2021-07-09 RX ORDER — CLOZAPINE 25 MG/1
50 TABLET ORAL DAILY
Status: DISCONTINUED | OUTPATIENT
Start: 2021-07-10 | End: 2021-07-19

## 2021-07-09 RX ADMIN — POLYETHYLENE GLYCOL 3350 17 G: 17 POWDER, FOR SOLUTION ORAL at 08:44

## 2021-07-09 RX ADMIN — CLOZAPINE 100 MG: 25 TABLET ORAL at 09:07

## 2021-07-09 RX ADMIN — ASPIRIN 81 MG CHEWABLE TABLET 81 MG: 81 TABLET CHEWABLE at 08:45

## 2021-07-09 RX ADMIN — DOCUSATE SODIUM 100 MG: 100 CAPSULE, LIQUID FILLED ORAL at 08:45

## 2021-07-09 RX ADMIN — MEMANTINE 5 MG: 5 TABLET ORAL at 08:45

## 2021-07-09 RX ADMIN — GUAIFENESIN 600 MG: 600 TABLET, EXTENDED RELEASE ORAL at 20:33

## 2021-07-09 RX ADMIN — Medication 50 MCG: at 08:44

## 2021-07-09 RX ADMIN — LEVOTHYROXINE SODIUM 88 MCG: 88 TABLET ORAL at 08:45

## 2021-07-09 RX ADMIN — SALMETEROL XINAFOATE 1 PUFF: 50 POWDER, METERED ORAL; RESPIRATORY (INHALATION) at 20:40

## 2021-07-09 RX ADMIN — CLOZAPINE 100 MG: 25 TABLET ORAL at 20:33

## 2021-07-09 RX ADMIN — SALMETEROL XINAFOATE 1 PUFF: 50 POWDER, METERED ORAL; RESPIRATORY (INHALATION) at 09:12

## 2021-07-09 RX ADMIN — BENZTROPINE MESYLATE 1 MG: 1 TABLET ORAL at 08:45

## 2021-07-09 RX ADMIN — BENZTROPINE MESYLATE 1 MG: 1 TABLET ORAL at 20:33

## 2021-07-09 RX ADMIN — GUAIFENESIN 600 MG: 600 TABLET, EXTENDED RELEASE ORAL at 08:44

## 2021-07-09 RX ADMIN — MELATONIN TAB 3 MG 3 MG: 3 TAB at 22:42

## 2021-07-09 RX ADMIN — RISPERIDONE 2 MG: 1 TABLET, ORALLY DISINTEGRATING ORAL at 08:44

## 2021-07-09 RX ADMIN — DOCUSATE SODIUM 100 MG: 100 CAPSULE, LIQUID FILLED ORAL at 20:33

## 2021-07-09 RX ADMIN — RISPERIDONE 3 MG: 3 TABLET, ORALLY DISINTEGRATING ORAL at 20:33

## 2021-07-09 ASSESSMENT — ACTIVITIES OF DAILY LIVING (ADL)
DRESS: INDEPENDENT
ORAL_HYGIENE: INDEPENDENT
HYGIENE/GROOMING: INDEPENDENT

## 2021-07-09 NOTE — PLAN OF CARE
Assessment/Intervention/Current Symtoms and Care Coordination  -Refer to psychosocial completed on July 1, 2021 for assessment/social functioning  -Chart review  -Pre round meeting with team  -Rounded with team, addressed patient needs/concerns  -Post round meeting with team  Current Symptoms include the following:       I received e-mail from Oli  asking if he could visit the pt and what the names of the nursing homes were. I gave him the names of [facility] nursing homes and said he can come on the unit.   VALERI Etienne,      (direct)[phone] (fax) [phone] (cell) [phone]     Discharge Plan or Goal  Locked nursing facility    Barriers to Discharge  Patient requires further psychiatric stabilization due to current symptomology    Referral Status  Skilled home nursing    Legal Status  Patient is under MI commitment in [facility]

## 2021-07-09 NOTE — PLAN OF CARE
"  Problem: Psychotic Symptoms  Goal: Social and Therapeutic (Psychotic Symptoms)  Description: Signs and symptoms of listed problems will be absent or manageable.  Recent Flowsheet Documentation  Taken 7/9/2021 1118 by Radha Donnelly RN  Psychotic Symptoms Assessed: all  Psychotic Symptoms Present:    insight    thought process    mood    speech    orientation    memory deficits    Pt's affect is flat blunted.  Pt is oriented to self only.  During check in pt is focused on discharge, appears to have poor insight into his mental health asking \"when am I discharging\".  Pt has a good appetite.  No interaction with others, keeps to himself.  Pt was incontinent of urine this shift.  Encouraged pt to take a shower, pt declined. Staff helped pt to change the linen on his bed and also pt changed into clean scrubs.  Will continue to monitor client closely.           "
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
## 2021-07-09 NOTE — PLAN OF CARE
Pt appears to have slept 6.75 hours this shift.  No concerns reported or noted.  15 minute checks remain ongoing.  Will continue to monitor and support plan of care.

## 2021-07-10 PROCEDURE — 250N000013 HC RX MED GY IP 250 OP 250 PS 637: Performed by: PSYCHIATRY & NEUROLOGY

## 2021-07-10 PROCEDURE — 124N000002 HC R&B MH UMMC

## 2021-07-10 RX ADMIN — BENZTROPINE MESYLATE 1 MG: 1 TABLET ORAL at 08:43

## 2021-07-10 RX ADMIN — DOCUSATE SODIUM 100 MG: 100 CAPSULE, LIQUID FILLED ORAL at 08:44

## 2021-07-10 RX ADMIN — CLOZAPINE 50 MG: 25 TABLET ORAL at 08:44

## 2021-07-10 RX ADMIN — BENZTROPINE MESYLATE 1 MG: 1 TABLET ORAL at 21:53

## 2021-07-10 RX ADMIN — ASPIRIN 81 MG CHEWABLE TABLET 81 MG: 81 TABLET CHEWABLE at 08:43

## 2021-07-10 RX ADMIN — LEVOTHYROXINE SODIUM 88 MCG: 88 TABLET ORAL at 08:44

## 2021-07-10 RX ADMIN — GUAIFENESIN 600 MG: 600 TABLET, EXTENDED RELEASE ORAL at 21:52

## 2021-07-10 RX ADMIN — CLOZAPINE 200 MG: 100 TABLET ORAL at 21:54

## 2021-07-10 RX ADMIN — MEMANTINE 5 MG: 5 TABLET ORAL at 08:44

## 2021-07-10 RX ADMIN — SALMETEROL XINAFOATE 1 PUFF: 50 POWDER, METERED ORAL; RESPIRATORY (INHALATION) at 21:55

## 2021-07-10 RX ADMIN — Medication 50 MCG: at 08:43

## 2021-07-10 RX ADMIN — SALMETEROL XINAFOATE 1 PUFF: 50 POWDER, METERED ORAL; RESPIRATORY (INHALATION) at 08:45

## 2021-07-10 RX ADMIN — RISPERIDONE 1 MG: 1 TABLET, ORALLY DISINTEGRATING ORAL at 08:44

## 2021-07-10 RX ADMIN — GUAIFENESIN 600 MG: 600 TABLET, EXTENDED RELEASE ORAL at 08:44

## 2021-07-10 RX ADMIN — RISPERIDONE 3 MG: 3 TABLET, ORALLY DISINTEGRATING ORAL at 21:53

## 2021-07-10 RX ADMIN — POLYETHYLENE GLYCOL 3350 17 G: 17 POWDER, FOR SOLUTION ORAL at 08:43

## 2021-07-10 RX ADMIN — DOCUSATE SODIUM 100 MG: 100 CAPSULE, LIQUID FILLED ORAL at 21:53

## 2021-07-10 ASSESSMENT — ACTIVITIES OF DAILY LIVING (ADL)
HYGIENE/GROOMING: INDEPENDENT
DRESS: INDEPENDENT
ORAL_HYGIENE: INDEPENDENT

## 2021-07-10 NOTE — PLAN OF CARE
"Problem: Psychotic Symptoms  Goal: Psychotic Symptoms  Description: Signs and symptoms of listed problems will be absent or manageable.  Outcome: No Change     Gene has been isolative to his room the entire evening. He presents with flat affect and tense, irritable mood. He is not pleasant in interactions with staff. When RN gave pt his HS medications, he disgruntledly stated, \"What are all these fucking meds?!\", though he took them without issue. He complained of difficulty sleeping and received melatonin 3mg PRN at 2245.   "

## 2021-07-10 NOTE — PLAN OF CARE
Pt appears to have slept 6 hours this shift.  Pt requested/received extra blankets during the night.  No acute events overnight.  15 minute checks remain ongoing.  Will continue to monitor and support plan of care.

## 2021-07-10 NOTE — PLAN OF CARE
Problem: Psychotic Symptoms  Goal: Psychotic Symptoms  Description: Signs and symptoms of listed problems will be absent or manageable.  Outcome: No Change       Pt is oriented to self only.  Pt appears confused at times, needed to be shown his room.  Pt has been co-operative with medication and unit rules. Pt ate his meals.  Pt denies pain or any discomfort.  Will continue to assess.

## 2021-07-11 PROCEDURE — 250N000013 HC RX MED GY IP 250 OP 250 PS 637: Performed by: PSYCHIATRY & NEUROLOGY

## 2021-07-11 PROCEDURE — 124N000002 HC R&B MH UMMC

## 2021-07-11 RX ADMIN — Medication 50 MCG: at 09:27

## 2021-07-11 RX ADMIN — MEMANTINE 5 MG: 5 TABLET ORAL at 09:26

## 2021-07-11 RX ADMIN — POLYETHYLENE GLYCOL 3350 17 G: 17 POWDER, FOR SOLUTION ORAL at 09:29

## 2021-07-11 RX ADMIN — SALMETEROL XINAFOATE 1 PUFF: 50 POWDER, METERED ORAL; RESPIRATORY (INHALATION) at 09:30

## 2021-07-11 RX ADMIN — RISPERIDONE 3 MG: 3 TABLET, ORALLY DISINTEGRATING ORAL at 21:13

## 2021-07-11 RX ADMIN — BENZTROPINE MESYLATE 1 MG: 1 TABLET ORAL at 20:16

## 2021-07-11 RX ADMIN — CLOZAPINE 200 MG: 100 TABLET ORAL at 20:16

## 2021-07-11 RX ADMIN — CLOZAPINE 50 MG: 25 TABLET ORAL at 09:26

## 2021-07-11 RX ADMIN — ASPIRIN 81 MG CHEWABLE TABLET 81 MG: 81 TABLET CHEWABLE at 09:26

## 2021-07-11 RX ADMIN — LEVOTHYROXINE SODIUM 88 MCG: 88 TABLET ORAL at 09:27

## 2021-07-11 RX ADMIN — DOCUSATE SODIUM 100 MG: 100 CAPSULE, LIQUID FILLED ORAL at 20:16

## 2021-07-11 RX ADMIN — GUAIFENESIN 600 MG: 600 TABLET, EXTENDED RELEASE ORAL at 09:27

## 2021-07-11 RX ADMIN — DOCUSATE SODIUM 100 MG: 100 CAPSULE, LIQUID FILLED ORAL at 09:26

## 2021-07-11 RX ADMIN — SALMETEROL XINAFOATE 1 PUFF: 50 POWDER, METERED ORAL; RESPIRATORY (INHALATION) at 20:16

## 2021-07-11 RX ADMIN — BENZTROPINE MESYLATE 1 MG: 1 TABLET ORAL at 09:27

## 2021-07-11 RX ADMIN — GUAIFENESIN 600 MG: 600 TABLET, EXTENDED RELEASE ORAL at 20:16

## 2021-07-11 ASSESSMENT — ACTIVITIES OF DAILY LIVING (ADL)
DRESS: INDEPENDENT
HYGIENE/GROOMING: INDEPENDENT
ORAL_HYGIENE: INDEPENDENT

## 2021-07-11 NOTE — PLAN OF CARE
Problem: Psychotic Symptoms  Goal: Social and Therapeutic (Psychotic Symptoms)  Description: Signs and symptoms of listed problems will be absent or manageable.  Recent Flowsheet Documentation  Taken 7/11/2021 1110 by Radha Donnelly RN  Psychotic Symptoms Assessed: all  Psychotic Symptoms Present:    orientation    insight    thought process    Pt's affect is flat blunted.  Mood is calm.  Pt is oriented to self only.  Pt has a good appetite eating his meals without issue.  Reports sleep is good and did take a nap this shift.   Pt is compliant with his medication regimen.  Pt was incontinent of urine this morning, encouraged pt to shower, bed linen changed. Will continue to monitor pt and orient as needed.
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
## 2021-07-11 NOTE — PLAN OF CARE
Problem: Cognitive Impairment (Psychotic Signs/Symptoms)  Goal: Optimal Cognitive Function (Psychotic Signs/Symptoms)  Outcome: No Change     Gene has been isolative to his room the entire evening, with the exception of eating dinner in the dining room with peers. He rarely speaks. He presents with flat affect and tense mood.

## 2021-07-12 LAB
ALBUMIN SERPL-MCNC: 3.2 G/DL (ref 3.4–5)
ALP SERPL-CCNC: 81 U/L (ref 40–150)
ALT SERPL W P-5'-P-CCNC: 92 U/L (ref 0–70)
ANION GAP SERPL CALCULATED.3IONS-SCNC: 8 MMOL/L (ref 3–14)
AST SERPL W P-5'-P-CCNC: 137 U/L (ref 0–45)
BILIRUB SERPL-MCNC: 0.5 MG/DL (ref 0.2–1.3)
BUN SERPL-MCNC: 16 MG/DL (ref 7–30)
CALCIUM SERPL-MCNC: 8.9 MG/DL (ref 8.5–10.1)
CHLORIDE BLD-SCNC: 108 MMOL/L (ref 94–109)
CO2 SERPL-SCNC: 21 MMOL/L (ref 20–32)
CREAT SERPL-MCNC: 0.74 MG/DL (ref 0.66–1.25)
GFR SERPL CREATININE-BSD FRML MDRD: >90 ML/MIN/1.73M2
GLUCOSE BLD-MCNC: 203 MG/DL (ref 70–99)
POTASSIUM BLD-SCNC: 4.1 MMOL/L (ref 3.4–5.3)
PROT SERPL-MCNC: 8.1 G/DL (ref 6.8–8.8)
SODIUM SERPL-SCNC: 137 MMOL/L (ref 133–144)

## 2021-07-12 PROCEDURE — 124N000002 HC R&B MH UMMC

## 2021-07-12 PROCEDURE — 99233 SBSQ HOSP IP/OBS HIGH 50: CPT | Performed by: PSYCHIATRY & NEUROLOGY

## 2021-07-12 PROCEDURE — 250N000013 HC RX MED GY IP 250 OP 250 PS 637: Performed by: PSYCHIATRY & NEUROLOGY

## 2021-07-12 PROCEDURE — 36415 COLL VENOUS BLD VENIPUNCTURE: CPT | Performed by: PHYSICIAN ASSISTANT

## 2021-07-12 PROCEDURE — 80053 COMPREHEN METABOLIC PANEL: CPT | Performed by: PHYSICIAN ASSISTANT

## 2021-07-12 RX ORDER — RISPERIDONE 1 MG/1
2 TABLET, ORALLY DISINTEGRATING ORAL AT BEDTIME
Status: COMPLETED | OUTPATIENT
Start: 2021-07-12 | End: 2021-07-13

## 2021-07-12 RX ADMIN — GUAIFENESIN 600 MG: 600 TABLET, EXTENDED RELEASE ORAL at 08:22

## 2021-07-12 RX ADMIN — DOCUSATE SODIUM 100 MG: 100 CAPSULE, LIQUID FILLED ORAL at 08:22

## 2021-07-12 RX ADMIN — SALMETEROL XINAFOATE 1 PUFF: 50 POWDER, METERED ORAL; RESPIRATORY (INHALATION) at 20:50

## 2021-07-12 RX ADMIN — MEMANTINE 5 MG: 5 TABLET ORAL at 08:22

## 2021-07-12 RX ADMIN — POLYETHYLENE GLYCOL 3350 17 G: 17 POWDER, FOR SOLUTION ORAL at 08:23

## 2021-07-12 RX ADMIN — ASPIRIN 81 MG CHEWABLE TABLET 81 MG: 81 TABLET CHEWABLE at 08:21

## 2021-07-12 RX ADMIN — GUAIFENESIN 600 MG: 600 TABLET, EXTENDED RELEASE ORAL at 20:49

## 2021-07-12 RX ADMIN — LEVOTHYROXINE SODIUM 88 MCG: 88 TABLET ORAL at 08:22

## 2021-07-12 RX ADMIN — CLOZAPINE 50 MG: 25 TABLET ORAL at 08:22

## 2021-07-12 RX ADMIN — BENZTROPINE MESYLATE 1 MG: 1 TABLET ORAL at 20:49

## 2021-07-12 RX ADMIN — RISPERIDONE 2 MG: 1 TABLET, ORALLY DISINTEGRATING ORAL at 20:50

## 2021-07-12 RX ADMIN — BENZTROPINE MESYLATE 1 MG: 1 TABLET ORAL at 08:22

## 2021-07-12 RX ADMIN — Medication 50 MCG: at 08:23

## 2021-07-12 RX ADMIN — CLOZAPINE 200 MG: 100 TABLET ORAL at 20:49

## 2021-07-12 RX ADMIN — SALMETEROL XINAFOATE 1 PUFF: 50 POWDER, METERED ORAL; RESPIRATORY (INHALATION) at 08:24

## 2021-07-12 RX ADMIN — DOCUSATE SODIUM 100 MG: 100 CAPSULE, LIQUID FILLED ORAL at 20:50

## 2021-07-12 ASSESSMENT — ACTIVITIES OF DAILY LIVING (ADL)
HYGIENE/GROOMING: INDEPENDENT
LAUNDRY: WITH SUPERVISION
DRESS: SCRUBS (BEHAVIORAL HEALTH);INDEPENDENT
ORAL_HYGIENE: INDEPENDENT
DRESS: INDEPENDENT
HYGIENE/GROOMING: SHOWER;INDEPENDENT
ORAL_HYGIENE: INDEPENDENT

## 2021-07-12 NOTE — PLAN OF CARE
"Problem: Cognitive Impairment (Psychotic Signs/Symptoms)  Goal: Optimal Cognitive Function (Psychotic Signs/Symptoms)  Outcome: No Change     Gene has been isolative to his room the entire evening, with the exception of eating dinner in the dining room with peers. He asked RN when his ride was coming, apparently expecting that he was about to discharge. He asked another staff when he is leaving, and when staff replied they weren't sure about placement, he replied, \"What the fuck does that mean?!\"      When this RN asked pt what year it was, he replied 1998. When RN asked where he was, he replied [place] [facility]. When RN asked who the president was, he said he didn't know.         "

## 2021-07-12 NOTE — PROGRESS NOTES
Brief Medicine Note    Medicine following peripherally for LFT monitoring.  LFTs today improved:  (182) and ALT 92 (129). T bili and ALP WNL.     Today's vital signs, medications, and nursing notes were reviewed.      /77 (BP Location: Left arm)   Pulse 110   Temp 97.8  F (36.6  C) (Oral)   Resp 16   Ht 1.829 m (6')   Wt 87.5 kg (192 lb 14.4 oz)   SpO2 95%   BMI 26.16 kg/m      Continue current plan of care. Will continue to trend CMP In 3 days to ensure continued downtrend.     Kim Harman PA-C  Hospitalist Service  Pager [phone]
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
## 2021-07-12 NOTE — PLAN OF CARE
Assessment/Intervention/Current Symtoms and Care Coordination  -Chart review  -Pre round meeting with team  -Rounded with team, addressed patient needs/concerns  -Post round meeting with team  Current Symptoms include the following: disorganization      Plan for this pt is for [facility] to find a ;locked nursing home that will accept this pt with a plan to go back to court to add the nursing home to the commitment- but the commitment to [place] and the Commissioner would remain in place.      I have been calling nursing homes.     I left a vm for  [facility] Inpatient Liaison - Annia Burciaga, RN - [phone] asking if she had any resources.    I called the Senior Linkage Line @ [phone] to get a list of locked nursing home memory units. They referred me to [employer] website.    I left a vm for the [facility] asking if the pt had a CADI .      Nursing Home Work Sheet - [facility]  [address] [place], [place]  [facility] @[phone]    [email or web address]  Jeremias gave me this contact as someone who agreed to take this pt.     7/12/21@2:15PM  Left vm for [facility]   3:30PM  Received call back. She said they would take him with the commitment and the rep payee.   However after we hung up I realized I didn t  clarify the setting type. I e-mailed her to get this information. It looks like it could be only customized living. She wrote back and said this:  This is a customized living facility with a 24/7 awake  staff. It has a potential for a surveillance camera placement but it s not a locked unit.   Thanks      Company:[employer]   Facility:[facility]  [phone]  Jeremias gave me this contact as someone who agreed to take this pt.     7/12/21@10:15AM   left vm for Marilyn  3:45 called again but did not leave vm   [facility] Jimmy  [phone]  7/12/21@10:30AM   I reached [facility] live and explained the situation. She said they still would not take the pt  even if  if under a commitment and Yanez. She states it is a state mandate especially in a secure unit.     [facility]        No locked unit Pilar  59    No [phone]  7/12/21@10:40AM   Pilar said they do not have a locked unit. She recommends their [facility] which has a wander-guard system. I discussed this with the provider and we both agree that the pt is not appropriate for a wander-guard system.     [place], [place]      No locked unit, it is a secure unit with wanderguard [phone]  Cell: [phone]  7/12/21 @ 2:50PM  Could not reach the right staff  3:50PM left  for admissions staff. They called back and said there is No locked unit, it is a secure unit with wanderguard     [place]      No locked unit   7/12/21 @ 3:05PM  Was told there was no locked unit   [facility]      No locked unit [phone]  Fax:[phone]  7/12/21 @ 2:40PM  They do not have a locked unit.   [facility]    7/12/21 - Called,  Neurocognitive Program  no locked unit   [facility] [phone]  7/12/21 - Called  Neurocognitive Program. Left .     [facility] 218-  7/12/21 - Called,  Neurocognitive Program  no locked unit         Discharge Plan or Goal  Pending stabilization & development of a safe discharge plan.  Considerations include: locked nursing home    Barriers to Discharge  Patient requires further psychiatric stabilization due to current symptomology    Referral Status  locked nursing home    Legal Status  Patient is under MI commitment in [facility]

## 2021-07-12 NOTE — PROGRESS NOTES
"[facility], [employer]   Psychiatric Progress Note  Hospital Day: 12        Interim History:   The patient's care was discussed with the treatment team during the daily team meeting and/or staff's chart notes were reviewed.  Staff report patient has tended to isolate to room, out for meals, continues to be confused, taking medications as prescribed, did have one episode of urinary incontinence overnight, declining to shower, no acute events overnight.     Upon interview, pt was lying in bed resting but awake. Mood is \"good\", he denies SI or HI. Denies AVH. He is tolerating medications, reports feeling tired, denies other side effects. He inquired about discharge, again reviewed team is working on finding place. He again mentioned he has \"mansions all over the [place] and writer again shared this information does not appear to be based in reality. He is oriented to person only. He denies any physical health discomfort, inquired about incontinence and he denies any urinary symptoms and denies episode of incontinence overnight. Will plan to have labs drawn for clozapine level tomorrow morning. He does agree to take a shower today and will let staff know when he is ready to do so. No additional concerns.          Medications:       aspirin  81 mg Oral Daily     [Held by provider] atorvastatin  80 mg Oral At Bedtime     benztropine  1 mg Oral BID     cholecalciferol  1,250 mcg Oral Q7 Days     cloZAPine  200 mg Oral At Bedtime     cloZAPine  50 mg Oral Daily     docusate sodium  100 mg Oral BID     guaiFENesin  600 mg Oral BID     levothyroxine  88 mcg Oral Daily     memantine  5 mg Oral Daily     nicotine  1 patch Transdermal Daily     nicotine   Transdermal Q8H     polyethylene glycol  17 g Oral Daily     risperiDONE  3 mg Sublingual At Bedtime     salmeterol  1 puff Inhalation BID     vitamin D3  50 mcg Oral Daily          Allergies:     Allergies   Allergen Reactions     Ibuprofen      Latex  " "         Labs:     No results found for this or any previous visit (from the past 48 hour(s)).       Psychiatric Examination:     /82   Pulse 99   Temp 98.3  F (36.8  C) (Oral)   Resp 18   Ht 1.829 m (6')   Wt 87.5 kg (192 lb 14.4 oz)   SpO2 100%   BMI 26.16 kg/m    Weight is 192 lbs 14.4 oz  Body mass index is 26.16 kg/m .    Orthostatic Vitals       Most Recent      Sitting Orthostatic /77 07/12 0800    Sitting Orthostatic Pulse (bpm) 110 07/12 0800    Standing Orthostatic BP --  Comment: declined 07/12 0800        Appearance: awake, alert and disheveled   Attitude: somewhat cooperative  Eye Contact:  fair  Mood:  \"good\"  Affect:  mood congruent and intensity is flat  Speech:  soft volume, raspy ,dysarthric   Language: fluent and intact in English  Psychomotor, Gait, Musculoskeletal:  no evidence of tardive dyskinesia, dystonia, or tics, no noted chewing movements of buccal-ingual dyskinesia during interview today  Thought Process:  disorganized  Associations:  no loose associations  Thought Content:  no evidence of suicidal ideation or homicidal ideation and delusional  Insight:  limited  Judgement:  limited  Oriented to:  person  Attention Span and Concentration:  limited  Recent and Remote Memory:  limited  Fund of Knowledge:  delayed    Clinical Global Impressions  First:  Considering your total clinical experience with this particular patient population, how severe are the patient's symptoms at this time?: 7 (07/01/21 0630)  Compared to the patient's condition at the START of treatment, this patient's condition is: 4 (07/01/21 0630)  Most recent:  Considering your total clinical experience with this particular patient population, how severe are the patient's symptoms at this time?: 7 (07/01/21 0630)  Compared to the patient's condition at the START of treatment, this patient's condition is: 4 (07/01/21 0630)           Precautions:     Behavioral Orders   Procedures     Assault precautions "     Code 2     Elopement precautions     Routine Programming     As clinically indicated     Single Room     Status 15     Every 15 minutes.          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   Stimulant use disorder, in remission.   Transaminitis, possibly medication induced   Hx of CVA  Vit D deficiency  Hypertension  COPD  Hx of infective endocarditis with severe mitral and aortic regurgitation s/p biprostehtic valve replacement 2015  Hx of HFrEF now recovered  Tobacco use disorder  Non-severe malnutrition   Urinary incontinence         Assessment & Plan:   Assessment and hospital summary:  The patient is a 54yo male with a history of schizophrenia and TBI and multiple medical co-morbidities as above who was admitted after being transferred from [facility] medical [facility] after a nearly year-long stay. Is currently under commitment with [facility] recently amended to include Clozaril. While at [facility] was noted to have ongoing agitation and frequently attempting to elope from unit requiring 1:1 staffing for safety. He was started on 1:1 staff upon transfer, this was discontinued as patient has been more cooperative without elopement attempts. IM consult completed on admission and continue to follow due to elevated LFTs. Haldol and VPA discontinued due to LFTs. Currently cross titrating from risperidone to clozaril after Yanez amended.     Psychiatric treatment/inteventions:  Medications:   -continue Clozaril 50mg in AM and 200mg at bedtime for psychosis and agitation. Will continue to titrate to response following clozapine level (target of level >350)  -decrease risperdal to 2mg at bedtime in continued cross-taper  -continue benztropine 1mg BID for possible EPSE  -continue memantine 5mg daily for neurocognitive disorder  -continue risperdal 1mg Q6H PRN agitation    -continue lorazepam 0.5-1.0mg Q4H PRN anxiety or severe agitation     Laboratory/Imaging: clozapine level ordered for AM; weekly WBC and ANC for clozapine monitoring, last ANC 7/9 was 2.9; next ANC 7/16     Patient will be treated in therapeutic milieu with appropriate individual and group therapies as described.     Medical treatment/interventions:  Medical concerns:   1) IM consult placed on admission, per consult note on 7/1/21:   Diagnoses:    #Major neurocognitive disorder dt hx of TBI and alcohol use disorder  #Schizophrenia  #Under commitment  Had been residing in group home prior to admission.  Brought to hospital for evaluation of aggressive behaviors. Group home now unwilling to take him back. Has had numerous CODE 21s called this stay. Seen by psych, meds adjusted. Is currently under civil commitment and court hold through [facility] until 7/31/21. Please see recent discharge summary for details on 6/30/20201.   -Management per psychiatry      #Vitamin D Deficiency- Vit D level 16. Has been in hospital since Sept 2020. Started on cholecalciferol 23642 units on 6/17. Continue high-dose weekly replacement for 6 weeks total. Following high dose replacement recommend starting vitamin D 2000 international unit(s) daily replacement (start date 8/5/2021).      #Possible Tardive Dyskinesia vs EPSE- Per psych assessment on 4/1/21, noted to have possible tardive dyskinesia vs extrapyramidal side effects of meds.  - At that time, recommended to increase Cogentin to 1mg BID and add vitamin E 800 international unit(s) daily.      #Hx of CVA - hx of basal ganglia stroke in 2015. No focal neuro deficits aside from cognitive dysfunction as above.     #Hyperlipidemia - Continue PTA ASA 81 mg daily and atorvastatin 80 mg daily.      #COPD - Not O2 dependent. Continue PTA on salmeterol and albuterol PRN. Patient occasionally refusing inhalers, encourage compliance. Mucinex added for intermittent cough at [facility].       #Hypothyroidism- TSH 3.18 on 9/2020. Repeat recently on 5/29/2021 slightly elevated 5.18, with no T4 . Continue PTA levothyroxine 88 mcg daily. Repeat TSH with reflex to T4.      #Hx of infective endocarditis with severe mitral and aortic regurgitation S/P bioprosthetic valve replacement (10/2015)  #Hx of HFrEF, now recovered, not in acute exacerbation  Follows with Dr. Dockery of [facility] V[facility] Cardiology, last seen in 1/2020. Echocardiogram in 5/2016 with EF 50%, no evidence for prosthetic mitral and aortic valve dysfunction.   - Follow-up with outpatient Cardiology upon dismissal from the hospital (on every 2 year follow-ups).  - Continue ASA 81 mg daily      #Tobacco use disorder- Using nicotine patch and PRN gum.     #Non-severe malnutrition- Nutrition consulted and following at OSH.      Ordered CMP, CBC, and TSH with reflex.  No further recommendations at this time. Please page the on-call SHREE for any intercurrent medical issues which arise.      ADDENDUM: TSH normal. CBC normal. ALT elevated at 174, with unsatisfactory AST. Per chart review, LFTs last checked in December with  and . T bili and alk phos.  Reviewed with pharmacy, and possible medications causing would be Haldol, depakote, and statin. Discussed with psych who will taper haldol and Depakote. Holding statin, will check LDL.  Will repeat LFTs in AM, and check CK. Medicine will follow up on repeat LFTs, and CK.      Edith Alfaro PA-C  Hospitalist Service    2) Per updated progress note by IM 7/4:   A/P:  #Transaminitis - slowly rising LFTs since December. Asymptomatic. Reviewed with pharmacy, and possible medications causing would be Haldol, depakote, and statin. Discussed with psych who will taper haldol and Depakote. Holding statin, (total cholesterol 91, with LDL 41). Repeat LFTs continue to rise slightly.   -Abdominal US tomorrow (NPO after midnight)  -Hepatitis B surface ab and agn and Hep C ab   -Repeat LFTs in 3  days     Medicine will follow up on Abdominal US, viral hepatitis studies and repeat LFTs.  No further recommendations at this time. Please page the on-call SHREE for any intercurrent medical issues which arise.      Edith Alfaro PA-C  Hospitalist Service      3) RN staff reported urinary incontinence over weekend 7/9, per chart review patient has history of intermittent urinary incontinence going back to at least 2016, will continue to monitor and discuss with IM if patient has future episodes     This note was created by undersigned using a Dragon dictation system. All typing errors or contextual distortion are unintentional and software inherent.     Disposition Plan   Reason for ongoing admission: is unable to care for self due to severe psychosis or mariusz and neurocognitive disorder  Discharge location: TBD  Discharge Medications: not ordered  Follow-up Appointments: not scheduled  Legal Status: MI commitment with Beau  Entered by: Jeanette Dobbins on 7/12/2021 at 7:51 AM

## 2021-07-12 NOTE — PLAN OF CARE
Problem: Cognitive Impairment (Psychotic Signs/Symptoms)  Goal: Optimal Cognitive Function (Psychotic Signs/Symptoms)  Outcome: No Change   Patient is oriented to self but not place or date, he said Ina was president.  He said he is depressed from being here. Denies anxiety. Was asking when he can leave, was directed to speak with doctor. Ate meals in lounge, has been  resting/sleeping in bed the rest of the shift. Took shower at the end of the shift.

## 2021-07-13 PROCEDURE — 36415 COLL VENOUS BLD VENIPUNCTURE: CPT | Performed by: PSYCHIATRY & NEUROLOGY

## 2021-07-13 PROCEDURE — 250N000013 HC RX MED GY IP 250 OP 250 PS 637: Performed by: PSYCHIATRY & NEUROLOGY

## 2021-07-13 PROCEDURE — 124N000002 HC R&B MH UMMC

## 2021-07-13 PROCEDURE — 99233 SBSQ HOSP IP/OBS HIGH 50: CPT | Performed by: PSYCHIATRY & NEUROLOGY

## 2021-07-13 PROCEDURE — 80159 DRUG ASSAY CLOZAPINE: CPT | Performed by: PSYCHIATRY & NEUROLOGY

## 2021-07-13 RX ORDER — RISPERIDONE 1 MG/1
1 TABLET, ORALLY DISINTEGRATING ORAL AT BEDTIME
Status: DISCONTINUED | OUTPATIENT
Start: 2021-07-14 | End: 2021-07-19

## 2021-07-13 RX ADMIN — GUAIFENESIN 600 MG: 600 TABLET, EXTENDED RELEASE ORAL at 20:34

## 2021-07-13 RX ADMIN — DOCUSATE SODIUM 100 MG: 100 CAPSULE, LIQUID FILLED ORAL at 08:45

## 2021-07-13 RX ADMIN — BENZTROPINE MESYLATE 1 MG: 1 TABLET ORAL at 08:45

## 2021-07-13 RX ADMIN — POLYETHYLENE GLYCOL 3350 17 G: 17 POWDER, FOR SOLUTION ORAL at 08:45

## 2021-07-13 RX ADMIN — LEVOTHYROXINE SODIUM 88 MCG: 88 TABLET ORAL at 08:45

## 2021-07-13 RX ADMIN — ASPIRIN 81 MG CHEWABLE TABLET 81 MG: 81 TABLET CHEWABLE at 08:45

## 2021-07-13 RX ADMIN — RISPERIDONE 2 MG: 1 TABLET, ORALLY DISINTEGRATING ORAL at 20:34

## 2021-07-13 RX ADMIN — BENZTROPINE MESYLATE 1 MG: 1 TABLET ORAL at 20:34

## 2021-07-13 RX ADMIN — SALMETEROL XINAFOATE 1 PUFF: 50 POWDER, METERED ORAL; RESPIRATORY (INHALATION) at 20:34

## 2021-07-13 RX ADMIN — CLOZAPINE 200 MG: 100 TABLET ORAL at 20:34

## 2021-07-13 RX ADMIN — MEMANTINE 5 MG: 5 TABLET ORAL at 08:45

## 2021-07-13 RX ADMIN — GUAIFENESIN 600 MG: 600 TABLET, EXTENDED RELEASE ORAL at 08:45

## 2021-07-13 RX ADMIN — DOCUSATE SODIUM 100 MG: 100 CAPSULE, LIQUID FILLED ORAL at 20:34

## 2021-07-13 RX ADMIN — CLOZAPINE 50 MG: 25 TABLET ORAL at 08:45

## 2021-07-13 RX ADMIN — SALMETEROL XINAFOATE 1 PUFF: 50 POWDER, METERED ORAL; RESPIRATORY (INHALATION) at 08:53

## 2021-07-13 RX ADMIN — Medication 50 MCG: at 08:45

## 2021-07-13 ASSESSMENT — ACTIVITIES OF DAILY LIVING (ADL)
LAUNDRY: UNABLE TO COMPLETE
DRESS: INDEPENDENT;SCRUBS (BEHAVIORAL HEALTH)
LAUNDRY: UNABLE TO COMPLETE
ORAL_HYGIENE: INDEPENDENT
HYGIENE/GROOMING: INDEPENDENT
ORAL_HYGIENE: INDEPENDENT
HYGIENE/GROOMING: INDEPENDENT
DRESS: INDEPENDENT;SCRUBS (BEHAVIORAL HEALTH)

## 2021-07-13 NOTE — PLAN OF CARE
Assessment/Intervention/Current Symtoms and Care Coordination  -Chart review  -Pre round meeting with team  -Rounded with team, addressed patient needs/concerns  -Post round meeting with team  Current Symptoms include the following: Psychosis      7/13/21@2pm  Spoke with Marilyn at [place] re: [facility].   She said that they had denied this case last year due to the pt s aggression. I explained the administrative proposal and that the pt may be improved since then. She agreed to talk to the  at [facility] re: the administrative proposal and I agrees to send notes to show then updated clinical information.  Fax to: [phone]  Notes sent.    Aneesh BAILON,  got back to me and said that the pt peñaloza snot have a CADI .    I received a list of nursing homes with locked units from other [facility]'s.      Discharge Plan or Goal  Pending stabilization & development of a safe discharge plan.  Considerations include: locked nursing home    Barriers to Discharge  Patient requires further psychiatric stabilization due to current symptomology    Referral Status  nursing home    Legal Status  Patient is under MI commitment in [facility]    I received the recommitment order today.  Order  Respondent is recommitted as a person who poses a risk of harm due to a mental illnes to the Commsioner of Human Services and the head of [facility] until July 12, 2022.

## 2021-07-13 NOTE — PROGRESS NOTES
"[facility], [place]   Psychiatric Progress Note  Hospital Day: 13        Interim History:   The patient's care was discussed with the treatment team during the daily team meeting and/or staff's chart notes were reviewed.  Staff report patient was visible in the milieu, had one outburst where he yelled out with profanities, not prompted, pt reported hearing AH, inquiring about discharge, taking medications as prescribed, no acute events overnight.     Upon interview, patient was lying in bed resting but awake, had been visible out in the milieu earlier in the morning and did attend community meeting. He reports mood is \"good\", denies SI or HI, denies AVH currently but did report having AH last night. Tolerating medications, denies side effects, including sedation, reviewed plan to be reducing his risperidone and increase clozapine further if indicated clinically and pending level. He inquired about discharge, reviewed again team is working on finding placement, he again mentions he has mansions all over the [place]. When writer challenged this information he became somewhat agitated stating \"the titles are in my name\". He denied having any further questions/concerns.          Medications:       aspirin  81 mg Oral Daily     [Held by provider] atorvastatin  80 mg Oral At Bedtime     benztropine  1 mg Oral BID     cholecalciferol  1,250 mcg Oral Q7 Days     cloZAPine  200 mg Oral At Bedtime     cloZAPine  50 mg Oral Daily     docusate sodium  100 mg Oral BID     guaiFENesin  600 mg Oral BID     levothyroxine  88 mcg Oral Daily     memantine  5 mg Oral Daily     nicotine  1 patch Transdermal Daily     nicotine   Transdermal Q8H     polyethylene glycol  17 g Oral Daily     risperiDONE  2 mg Sublingual At Bedtime     salmeterol  1 puff Inhalation BID     vitamin D3  50 mcg Oral Daily          Allergies:     Allergies   Allergen Reactions     Ibuprofen      Latex           Labs:     Recent Results " "(from the past 48 hour(s))   Comprehensive metabolic panel    Collection Time: 07/12/21  9:06 AM   Result Value Ref Range    Sodium 137 133 - 144 mmol/L    Potassium 4.1 3.4 - 5.3 mmol/L    Chloride 108 94 - 109 mmol/L    Carbon Dioxide (CO2) 21 20 - 32 mmol/L    Anion Gap 8 3 - 14 mmol/L    Urea Nitrogen 16 7 - 30 mg/dL    Creatinine 0.74 0.66 - 1.25 mg/dL    Calcium 8.9 8.5 - 10.1 mg/dL    Glucose 203 (H) 70 - 99 mg/dL    Alkaline Phosphatase 81 40 - 150 U/L     (H) 0 - 45 U/L    ALT 92 (H) 0 - 70 U/L    Protein Total 8.1 6.8 - 8.8 g/dL    Albumin 3.2 (L) 3.4 - 5.0 g/dL    Bilirubin Total 0.5 0.2 - 1.3 mg/dL    GFR Estimate >90 >60 mL/min/1.73m2          Psychiatric Examination:     /88 (BP Location: Right arm)   Pulse 116   Temp 98  F (36.7  C) (Oral)   Resp 16   Ht 1.829 m (6')   Wt 87.5 kg (192 lb 14.4 oz)   SpO2 95%   BMI 26.16 kg/m    Weight is 192 lbs 14.4 oz  Body mass index is 26.16 kg/m .    Orthostatic Vitals       Most Recent      Sitting Orthostatic /77 07/12 0800    Sitting Orthostatic Pulse (bpm) 110 07/12 0800    Standing Orthostatic BP --  Comment: declined 07/12 0800          Appearance: awake, alert and disheveled   Attitude: somewhat cooperative  Eye Contact:  fair  Mood:  \"good\"  Affect:  mood congruent and intensity is flat  Speech:  soft volume, raspy ,dysarthric   Language: fluent and intact in English  Psychomotor, Gait, Musculoskeletal:  no evidence of tardive dyskinesia, dystonia, or tics, no noted chewing movements of buccal-ingual dyskinesia during interview today  Thought Process:  perseverative on discharge otherwise disorganized  Associations:  no loose associations  Thought Content:  no evidence of suicidal ideation or homicidal ideation and delusional  Insight:  limited  Judgement:  limited  Oriented to:  person  Attention Span and Concentration:  limited  Recent and Remote Memory:  limited  Fund of Knowledge:  delayed    Clinical Global " Impressions  First:  Considering your total clinical experience with this particular patient population, how severe are the patient's symptoms at this time?: 7 (07/01/21 0630)  Compared to the patient's condition at the START of treatment, this patient's condition is: 4 (07/01/21 0630)  Most recent:  Considering your total clinical experience with this particular patient population, how severe are the patient's symptoms at this time?: 6 (07/13/21 1513)  Compared to the patient's condition at the START of treatment, this patient's condition is: 3 (07/13/21 1513)         Precautions:     Behavioral Orders   Procedures     Assault precautions     Code 2     Elopement precautions     Routine Programming     As clinically indicated     Single Room     Status 15     Every 15 minutes.          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   Stimulant use disorder, in remission.   Transaminitis, possibly medication induced   Hx of CVA  Vit D deficiency  Hypertension  COPD  Hx of infective endocarditis with severe mitral and aortic regurgitation s/p biprostehtic valve replacement 2015  Hx of HFrEF now recovered  Tobacco use disorder  Non-severe malnutrition   Urinary incontinence         Assessment & Plan:   Assessment and hospital summary:  The patient is a 56yo male with a history of schizophrenia and TBI and multiple medical co-morbidities as above who was admitted after being transferred from [facility] medical [facility] after a nearly year-long stay. Is currently under commitment with [facility] recently amended to include Clozaril. While at [facility] was noted to have ongoing agitation and frequently attempting to elope from unit requiring 1:1 staffing for safety. He was started on 1:1 staff upon transfer, this was discontinued as patient has been more cooperative without elopement  attempts. IM consult completed on admission and continue to follow due to elevated LFTs. Haldol and VPA discontinued due to LFTs. Currently cross titrating from risperidone to clozaril after [facility]z amended.    Psychiatric treatment/inteventions:  Medications:   -continue Clozaril 50mg in AM and 200mg at bedtime for psychosis and agitation. Will continue to titrate to response following clozapine level (target of level >350)  -continue risperdal 2mg at bedtime tonight, will decrease to 1mg 7/14 in continued cross-taper  -continue benztropine 1mg BID for possible EPSE  -continue memantine 5mg daily for neurocognitive disorder  -continue risperdal 1mg Q6H PRN agitation   -continue lorazepam 0.5-1.0mg Q4H PRN anxiety or severe agitation     Laboratory/Imaging: clozapine level ordered this AM; weekly WBC and ANC for clozapine monitoring continues, last ANC 7/9 was 2.9; next ANC 7/16     Patient will be treated in therapeutic milieu with appropriate individual and group therapies as described.     Medical treatment/interventions:  Medical concerns:   1) IM consult placed on admission, per consult note on 7/1/21:   Diagnoses:    #Major neurocognitive disorder dt hx of TBI and alcohol use disorder  #Schizophrenia  #Under commitment  Had been residing in group home prior to admission.  Brought to hospital for evaluation of aggressive behaviors. Group home now unwilling to take him back. Has had numerous CODE 21s called this stay. Seen by psych, meds adjusted. Is currently under civil commitment and court hold through [facility] until 7/31/21. Please see recent discharge summary for details on 6/30/20201.   -Management per psychiatry      #Vitamin D Deficiency- Vit D level 16. Has been in hospital since Sept 2020. Started on cholecalciferol 44111 units on 6/17. Continue high-dose weekly replacement for 6 weeks total. Following high dose replacement recommend starting vitamin D 2000 international unit(s) daily replacement  (start date 8/5/2021).      #Possible Tardive Dyskinesia vs EPSE- Per psych assessment on 4/1/21, noted to have possible tardive dyskinesia vs extrapyramidal side effects of meds.  - At that time, recommended to increase Cogentin to 1mg BID and add vitamin E 800 international unit(s) daily.      #Hx of CVA - hx of basal ganglia stroke in 2015. No focal neuro deficits aside from cognitive dysfunction as above.     #Hyperlipidemia - Continue PTA ASA 81 mg daily and atorvastatin 80 mg daily.      #COPD - Not O2 dependent. Continue PTA on salmeterol and albuterol PRN. Patient occasionally refusing inhalers, encourage compliance. Mucinex added for intermittent cough at [facility].      #Hypothyroidism- TSH 3.18 on 9/2020. Repeat recently on 5/29/2021 slightly elevated 5.18, with no T4 . Continue PTA levothyroxine 88 mcg daily. Repeat TSH with reflex to T4.      #Hx of infective endocarditis with severe mitral and aortic regurgitation S/P bioprosthetic valve replacement (10/2015)  #Hx of HFrEF, now recovered, not in acute exacerbation  Follows with Dr. Dockery of [facility] V[facility] Cardiology, last seen in 1/2020. Echocardiogram in 5/2016 with EF 50%, no evidence for prosthetic mitral and aortic valve dysfunction.   - Follow-up with outpatient Cardiology upon dismissal from the hospital (on every 2 year follow-ups).  - Continue ASA 81 mg daily      #Tobacco use disorder- Using nicotine patch and PRN gum.     #Non-severe malnutrition- Nutrition consulted and following at OSH.      Ordered CMP, CBC, and TSH with reflex.  No further recommendations at this time. Please page the on-call SHREE for any intercurrent medical issues which arise.      ADDENDUM: TSH normal. CBC normal. ALT elevated at 174, with unsatisfactory AST. Per chart review, LFTs last checked in December with  and . T bili and alk phos.  Reviewed with pharmacy, and possible medications causing would be Haldol, depakote, and statin. Discussed with  psych who will taper haldol and Depakote. Holding statin, will check LDL.  Will repeat LFTs in AM, and check CK. Medicine will follow up on repeat LFTs, and CK.      Edith Alfaro PA-C  Hospitalist Service    2) Per updated progress note by IM 7/4:   A/P:  #Transaminitis - slowly rising LFTs since December. Asymptomatic. Reviewed with pharmacy, and possible medications causing would be Haldol, depakote, and statin. Discussed with psych who will taper haldol and Depakote. Holding statin, (total cholesterol 91, with LDL 41). Repeat LFTs continue to rise slightly.   -Abdominal US tomorrow (NPO after midnight)  -Hepatitis B surface ab and agn and Hep C ab   -Repeat LFTs in 3 days     Medicine will follow up on Abdominal US, viral hepatitis studies and repeat LFTs.  No further recommendations at this time. Please page the on-call SHREE for any intercurrent medical issues which arise.      Edith Alfaro PA-C  Hospitalist Service    Per progress note 7/12:     Brief Medicine Note     Medicine following peripherally for LFT monitoring.  LFTs today improved:  (182) and ALT 92 (129). T bili and ALP WNL.      Today's vital signs, medications, and nursing notes were reviewed.       /77 (BP Location: Left arm)   Pulse 110   Temp 97.8  F (36.6  C) (Oral)   Resp 16   Ht 1.829 m (6')   Wt 87.5 kg (192 lb 14.4 oz)   SpO2 95%   BMI 26.16 kg/m       Continue current plan of care. Will continue to trend CMP In 3 days to ensure continued downtrend.      Kim Harman PA-C  Hospitalist Service      3) Urinary incontinence: RN staff reported urinary incontinence over weekend 7/9, per chart review patient has history of intermittent urinary incontinence going back to at least 2016, will continue to monitor and discuss with IM if patient has future episodes    This note was created by undersigned using a Dragon dictation system. All typing errors or contextual distortion are unintentional and software inherent.      Disposition Plan   Reason for ongoing admission: is unable to care for self due to severe psychosis or mariusz and neurocognitive disorder  Discharge location: TBD  Discharge Medications: not ordered  Follow-up Appointments: not scheduled  Legal Status: MI commitment with [facility]  Entered by: Jeanette Dobbins on 7/13/2021 at 7:41 AM

## 2021-07-13 NOTE — PLAN OF CARE
Patient spent majority of the shift in the milieu. Ate dinner. Patient kept mostly to himself. Patient had one incident where he yelled out some profanities not directed towards anyone while sitting in the lounge area. When asked by staff why he did this he said that he was hearing voices. Denies suicidal ideation and self injurious thoughts. Reports being depressed. Denies anxiety.  Med compliant.     Patient evaluation continues. Assessed mood,anxiety,thoughts and behavior.     Patient  gradually progressing towards goals.    Patient is encouraged to participate in groups and assisted to develop healthy coping skills.     VS reviewed: /88 (BP Location: Right arm)   Pulse 116   Temp 98  F (36.7  C) (Oral)   Resp 16   Ht 1.829 m (6')   Wt 87.5 kg (192 lb 14.4 oz)   SpO2 95%   BMI 26.16 kg/m      Length of stay: 13    Refer to daily team meeting notes for individualized plan of care. Nursing will continue to assess.-

## 2021-07-13 NOTE — PLAN OF CARE
Problem: Psychotic Symptoms  Goal: Psychotic Symptoms  Outcome: No Change  Goal: Social and Therapeutic (Psychotic Symptoms)  Outcome: No Change     Patient is irritable, guarded, and dismissive this shift.  He denies anxiety, depression, pain, SI, HI, and SIB.  Denies hallucinations but does appear to be responding to internal stimuli as he is observed talking to self.  He is medication compliant.  Will continue to monitor.  [place] RN

## 2021-07-14 LAB
CLOZAPINE SERPL-MCNC: 370 NG/ML
CLOZAPINE+NOR SERPL-MCNC: 553 NG/ML
CNO SERPL-MCNC: <100 NG/ML
NORCLOZAPINE SERPL-MCNC: 183 NG/ML

## 2021-07-14 PROCEDURE — 250N000013 HC RX MED GY IP 250 OP 250 PS 637: Performed by: PSYCHIATRY & NEUROLOGY

## 2021-07-14 PROCEDURE — 124N000002 HC R&B MH UMMC

## 2021-07-14 PROCEDURE — 99232 SBSQ HOSP IP/OBS MODERATE 35: CPT | Performed by: PSYCHIATRY & NEUROLOGY

## 2021-07-14 RX ORDER — MEMANTINE HYDROCHLORIDE 5 MG/1
5 TABLET ORAL 2 TIMES DAILY
Status: DISCONTINUED | OUTPATIENT
Start: 2021-07-14 | End: 2021-07-19

## 2021-07-14 RX ADMIN — BENZTROPINE MESYLATE 1 MG: 1 TABLET ORAL at 08:48

## 2021-07-14 RX ADMIN — Medication 50 MCG: at 08:48

## 2021-07-14 RX ADMIN — SALMETEROL XINAFOATE 1 PUFF: 50 POWDER, METERED ORAL; RESPIRATORY (INHALATION) at 08:53

## 2021-07-14 RX ADMIN — DOCUSATE SODIUM 100 MG: 100 CAPSULE, LIQUID FILLED ORAL at 08:48

## 2021-07-14 RX ADMIN — GUAIFENESIN 600 MG: 600 TABLET, EXTENDED RELEASE ORAL at 20:42

## 2021-07-14 RX ADMIN — BENZTROPINE MESYLATE 1 MG: 1 TABLET ORAL at 20:41

## 2021-07-14 RX ADMIN — RISPERIDONE 1 MG: 1 TABLET, ORALLY DISINTEGRATING ORAL at 20:42

## 2021-07-14 RX ADMIN — CLOZAPINE 200 MG: 100 TABLET ORAL at 20:42

## 2021-07-14 RX ADMIN — SALMETEROL XINAFOATE 1 PUFF: 50 POWDER, METERED ORAL; RESPIRATORY (INHALATION) at 20:43

## 2021-07-14 RX ADMIN — DOCUSATE SODIUM 100 MG: 100 CAPSULE, LIQUID FILLED ORAL at 20:42

## 2021-07-14 RX ADMIN — CLOZAPINE 50 MG: 25 TABLET ORAL at 08:49

## 2021-07-14 RX ADMIN — MEMANTINE 5 MG: 5 TABLET ORAL at 08:49

## 2021-07-14 RX ADMIN — MEMANTINE 5 MG: 5 TABLET ORAL at 20:42

## 2021-07-14 RX ADMIN — ASPIRIN 81 MG CHEWABLE TABLET 81 MG: 81 TABLET CHEWABLE at 08:48

## 2021-07-14 RX ADMIN — POLYETHYLENE GLYCOL 3350 17 G: 17 POWDER, FOR SOLUTION ORAL at 08:51

## 2021-07-14 RX ADMIN — GUAIFENESIN 600 MG: 600 TABLET, EXTENDED RELEASE ORAL at 08:49

## 2021-07-14 RX ADMIN — LEVOTHYROXINE SODIUM 88 MCG: 88 TABLET ORAL at 08:49

## 2021-07-14 ASSESSMENT — ACTIVITIES OF DAILY LIVING (ADL)
LAUNDRY: WITH SUPERVISION
ORAL_HYGIENE: INDEPENDENT
DRESS: INDEPENDENT
HYGIENE/GROOMING: HANDWASHING;INDEPENDENT
ORAL_HYGIENE: INDEPENDENT
LAUNDRY: WITH SUPERVISION
HYGIENE/GROOMING: INDEPENDENT
DRESS: INDEPENDENT

## 2021-07-14 NOTE — PLAN OF CARE
Problem: Psychotic Symptoms  Goal: Psychotic Symptoms  Outcome: No Change     Problem: Psychotic Symptoms  Goal: Social and Therapeutic (Psychotic Symptoms)  Outcome: No Change     Patient continues to be withdrawn, confused, and paranoid this shift.  He mumbles to self and is difficult to understand upon approach.  Patient approaches writer asking for directions to hospital and seems to believe that he is discharging today.  Writer informed patient that there is no plan to discharge today.  He seemed confused by this but was ultimately accepting of information.  Patient is up in lounge but does tend to keep to self.  Patient denies anxiety, depression, pain, SI, HI, and SIB.  Denies hallucinations but is observed talking to self.  No aggressive behaviors are observed.  Patient is medication compliant.  Patient remains safe on unit.  Will continue to monitor.  [place] RN

## 2021-07-14 NOTE — PLAN OF CARE
Night Shift Summary (7/13/21 into 07/14/21)    Pt in bed sleeping at start of shift. Breathing quiet and unlabored. Appears to have slept 7 hours.    Assault and Elopement precautions in addition to Single Room order, with no related events occurring this shift.     Will continue to monitor and assess.       Problem: Behavioral Health Plan of Care  Goal: Absence of New-Onset Illness or Injury  Outcome: Improving   Remains safe on the unit.     Problem: Sleep Disturbance  Goal: Adequate Sleep/Rest  Outcome: Improving   Slept ? 6 hours.

## 2021-07-14 NOTE — PLAN OF CARE
Patient isolates to self most of day. Comes out for meals.  Little to say to this nurse and nothing to say to peers. Refused need for a shower, he was medication compliant, VSS,  appetite good.  Can be irritable when asked questions . Does not like to be bothered. Blunted affect, mood labile.

## 2021-07-14 NOTE — PLAN OF CARE
Assessment/Intervention/Current Symtoms and Care Coordination  -Chart review  -Pre round meeting with team  -Rounded with team, addressed patient needs/concerns  -Post round meeting with team  Current Symptoms include the following: Psychosis        Discharge Plan or Goal  Pending stabilization & development of a safe discharge plan.  Considerations include: locked nursing facility    Barriers to Discharge  Patient requires further psychiatric stabilization due to current symptomology    Referral Status  locked nursing facility    Legal Status  Patient is under MI commitment in [facility]      I received a new [facility] document. I sent to scanning and placed in hard copy and on the AVS.  Order Authorizing Use of Neuroleptic Medication dated July 13, 2021  The head of [facility], or any other facility providing care and treatment to Respondent pursuant to this Court's current Order for Commitment is authorized to administer to Respondent  Clozaril (clozapine), Haldol (haloperidol), Risperdal (risperidone), an Invega ( paliperidone), in medically-indicated dosages, for the duration of the commitment, but not beyond one year without additional review by the Court.

## 2021-07-15 LAB
ALBUMIN SERPL-MCNC: 3.3 G/DL (ref 3.4–5)
ALP SERPL-CCNC: 75 U/L (ref 40–150)
ALT SERPL W P-5'-P-CCNC: 93 U/L (ref 0–70)
ANION GAP SERPL CALCULATED.3IONS-SCNC: 7 MMOL/L (ref 3–14)
AST SERPL W P-5'-P-CCNC: 133 U/L (ref 0–45)
BILIRUB SERPL-MCNC: 0.5 MG/DL (ref 0.2–1.3)
BUN SERPL-MCNC: 17 MG/DL (ref 7–30)
CALCIUM SERPL-MCNC: 9.5 MG/DL (ref 8.5–10.1)
CHLORIDE BLD-SCNC: 106 MMOL/L (ref 94–109)
CO2 SERPL-SCNC: 24 MMOL/L (ref 20–32)
CREAT SERPL-MCNC: 0.83 MG/DL (ref 0.66–1.25)
ERYTHROCYTE [DISTWIDTH] IN BLOOD BY AUTOMATED COUNT: 13.5 % (ref 10–15)
GFR SERPL CREATININE-BSD FRML MDRD: >90 ML/MIN/1.73M2
GLUCOSE BLD-MCNC: 117 MG/DL (ref 70–99)
HCT VFR BLD AUTO: 44.2 % (ref 40–53)
HGB BLD-MCNC: 15.6 G/DL (ref 13.3–17.7)
INR PPP: 0.97 (ref 0.86–1.14)
MCH RBC QN AUTO: 33.3 PG (ref 26.5–33)
MCHC RBC AUTO-ENTMCNC: 35.3 G/DL (ref 31.5–36.5)
MCV RBC AUTO: 94 FL (ref 78–100)
PLATELET # BLD AUTO: 192 10E3/UL (ref 150–450)
POTASSIUM BLD-SCNC: 4.2 MMOL/L (ref 3.4–5.3)
PROT SERPL-MCNC: 8.4 G/DL (ref 6.8–8.8)
RBC # BLD AUTO: 4.69 10E6/UL (ref 4.4–5.9)
SODIUM SERPL-SCNC: 137 MMOL/L (ref 133–144)
WBC # BLD AUTO: 6.9 10E3/UL (ref 4–11)

## 2021-07-15 PROCEDURE — 99232 SBSQ HOSP IP/OBS MODERATE 35: CPT | Performed by: PHYSICIAN ASSISTANT

## 2021-07-15 PROCEDURE — 93005 ELECTROCARDIOGRAM TRACING: CPT

## 2021-07-15 PROCEDURE — 250N000013 HC RX MED GY IP 250 OP 250 PS 637: Performed by: PSYCHIATRY & NEUROLOGY

## 2021-07-15 PROCEDURE — 85610 PROTHROMBIN TIME: CPT | Performed by: PHYSICIAN ASSISTANT

## 2021-07-15 PROCEDURE — H2032 ACTIVITY THERAPY, PER 15 MIN: HCPCS

## 2021-07-15 PROCEDURE — 87522 HEPATITIS C REVRS TRNSCRPJ: CPT | Performed by: PHYSICIAN ASSISTANT

## 2021-07-15 PROCEDURE — 80053 COMPREHEN METABOLIC PANEL: CPT | Performed by: PHYSICIAN ASSISTANT

## 2021-07-15 PROCEDURE — 99207 PR CDG-MDM COMPONENT: MEETS MODERATE - UP CODED: CPT | Performed by: PHYSICIAN ASSISTANT

## 2021-07-15 PROCEDURE — 93010 ELECTROCARDIOGRAM REPORT: CPT | Performed by: INTERNAL MEDICINE

## 2021-07-15 PROCEDURE — 85027 COMPLETE CBC AUTOMATED: CPT | Performed by: PHYSICIAN ASSISTANT

## 2021-07-15 PROCEDURE — 124N000002 HC R&B MH UMMC

## 2021-07-15 PROCEDURE — 36415 COLL VENOUS BLD VENIPUNCTURE: CPT | Performed by: PHYSICIAN ASSISTANT

## 2021-07-15 RX ADMIN — GUAIFENESIN 600 MG: 600 TABLET, EXTENDED RELEASE ORAL at 20:32

## 2021-07-15 RX ADMIN — CHOLECALCIFEROL CAP 1.25 MG (50000 UNIT) 1250 MCG: 1.25 CAP at 08:15

## 2021-07-15 RX ADMIN — MEMANTINE 5 MG: 5 TABLET ORAL at 08:14

## 2021-07-15 RX ADMIN — RISPERIDONE 1 MG: 1 TABLET, ORALLY DISINTEGRATING ORAL at 20:27

## 2021-07-15 RX ADMIN — LEVOTHYROXINE SODIUM 88 MCG: 88 TABLET ORAL at 08:14

## 2021-07-15 RX ADMIN — BENZOCAINE AND MENTHOL 1 LOZENGE: 15; 3.6 LOZENGE ORAL at 20:27

## 2021-07-15 RX ADMIN — SALMETEROL XINAFOATE 1 PUFF: 50 POWDER, METERED ORAL; RESPIRATORY (INHALATION) at 09:08

## 2021-07-15 RX ADMIN — CLOZAPINE 50 MG: 25 TABLET ORAL at 08:14

## 2021-07-15 RX ADMIN — Medication 50 MCG: at 08:13

## 2021-07-15 RX ADMIN — SALMETEROL XINAFOATE 1 PUFF: 50 POWDER, METERED ORAL; RESPIRATORY (INHALATION) at 20:37

## 2021-07-15 RX ADMIN — ASPIRIN 81 MG CHEWABLE TABLET 81 MG: 81 TABLET CHEWABLE at 08:13

## 2021-07-15 RX ADMIN — MEMANTINE 5 MG: 5 TABLET ORAL at 20:32

## 2021-07-15 RX ADMIN — GUAIFENESIN 600 MG: 600 TABLET, EXTENDED RELEASE ORAL at 08:14

## 2021-07-15 RX ADMIN — POLYETHYLENE GLYCOL 3350 17 G: 17 POWDER, FOR SOLUTION ORAL at 08:13

## 2021-07-15 RX ADMIN — CLOZAPINE 200 MG: 100 TABLET ORAL at 20:27

## 2021-07-15 RX ADMIN — DOCUSATE SODIUM 100 MG: 100 CAPSULE, LIQUID FILLED ORAL at 20:27

## 2021-07-15 RX ADMIN — DOCUSATE SODIUM 100 MG: 100 CAPSULE, LIQUID FILLED ORAL at 08:13

## 2021-07-15 RX ADMIN — BENZTROPINE MESYLATE 1 MG: 1 TABLET ORAL at 08:15

## 2021-07-15 RX ADMIN — BENZTROPINE MESYLATE 1 MG: 1 TABLET ORAL at 20:27

## 2021-07-15 ASSESSMENT — ACTIVITIES OF DAILY LIVING (ADL)
LAUNDRY: UNABLE TO COMPLETE
DRESS: INDEPENDENT
ORAL_HYGIENE: INDEPENDENT
ORAL_HYGIENE: PROMPTS;INDEPENDENT
LAUNDRY: WITH SUPERVISION
DRESS: PROMPTS;INDEPENDENT
HYGIENE/GROOMING: PROMPTS;INDEPENDENT
HYGIENE/GROOMING: HANDWASHING;INDEPENDENT

## 2021-07-15 ASSESSMENT — MIFFLIN-ST. JEOR: SCORE: 1755.68

## 2021-07-15 NOTE — PLAN OF CARE
Denies feeling suicidal, only frustration with being stuck here in hospital. He had no idea of where he was and needed to be told. Where he was. His answers was ' I got to get out of here' He does come out for meals , appetite good, VSS, sleeping well.  States feeling groggy. Poor ADL'S cooperative with meds and labs.  Denies all safety issues on the unit.

## 2021-07-15 NOTE — PLAN OF CARE
"  Problem: Psychotic Symptoms  Goal: Psychotic Symptoms  Description: Signs and symptoms of listed problems will be absent or manageable.  Outcome: No Change     Problem: Psychotic Symptoms  Goal: Social and Therapeutic (Psychotic Symptoms)  Description: Signs and symptoms of listed problems will be absent or manageable.  Outcome: No Change    John has spent the majority of this shift in his room. Has come out for supper. Sat for a short period of time in the television lounge withdrawn to himself. Irritated at times when asked questions. Appears disheveled and unkept. Informed this writer that his \" is coming to pick him up tonight to bring him home.\" His appetite is good. He continues on Elopement and Assault precautions. No stated or observations of SI/SIB/AH/VH. Nursing to continue to support current plan of care.       "

## 2021-07-15 NOTE — PROGRESS NOTES
Brief Medicine Note    Medicine following peripherally for LFT monitoring.  LFTs stable today 7-15 compared to 7-12.  Per chart review patient has had a longstanding history of intermittent elevated LFTs, with elevated ALT back in February 2020, and AST elevations in 2014.  Ultrasound this admission 7/5 with hepatosteatosis of cirrhosis without focal liver lesion.  No evidence of HCC.  The splenic and portal veins all antegrade in flow.  The patient did end the exam and refused to continue.     Today, vitals reviewed and noted patient to be tachycardia in 120s. Called RN and patient is doing well, is anxious and wants to be able to be discharged. He did have mild nose bleed per RN and this has already resolved.     He currently denies any chest pain, SOB, palpitations, orthopnea or PND. He notes occasional lightheadedness when he stands up quickly, but none currently at rest. Is eating and drinking well. Denies any pain. No lower extremity edema.     A/P:  GENERAL: Alert and oriented x 3. Appears comfortable. Answering questions appropriately.   HEENT: Anicteric sclera. Mucous membranes moist.   CV: Grade 3/6 systolic murmur. S1, S2. Tachycardic, regular rhythm. JVD not elevated.   RESPIRATORY: Effort normal on room air. Lungs CTAB with no wheezing, rales, rhonchi.   GI: Abdomen soft and non distended, bowel sounds present. No tenderness, rebound, guarding. No RUQ tenderness.   MUSCULOSKELETAL: No joint swelling or tenderness.   NEUROLOGICAL: No focal deficits. Moves all extremities.   EXTREMITIES: No peripheral edema. Intact bilateral pedal pulses.   SKIN: No jaundice. No rashes.     # Transaminitis  # History of Hepatitis C s/p treatment (reported)  # Hepatosteatosis vs cirrhosis:   Has had LFT elevations ALT dating back to 2/2020 and AST dating back to 2014. He has had slowly rising LFTs since December. Asymptomatic. Edith Dorado PA-C reviewed with pharmacy, and possible medications causing would be Haldol,  depakote, and statin. Discussed with psych who will taper haldol and Depakote. Holding statin, (total cholesterol 91, with LDL 41). Ultrasound this admission 7/5 with hepatosteatosis vs  cirrhosis without focal liver lesion.  No evidence of HCC.  The splenic and portal veins all antegrade in flow.  The patient did end the exam and refused to continue. Hepatitis C was reactive. Hepatitis B non-reactive. Patient reports a history of hepatitis C infection that was treated 25 years ago that he got from his girlfriend. He is asymptomatic. No RUQ tenderness.   - Will obtain HCV RNA now to assess if active viral load   - Given concern for cirrhosis, will need referral to Hepatology upon discharge for follow-up   - Continue to hold PTA statin   - Trend LFTs on 7/18   - Add on INR     # Tachycardia  # History of infectious endocarditis (2015) of mitral and aortic valves with severe regurgitation s/p replacement.   # History of HFrEF, resolved (EF 50% in 2016)  # Non-obstructive CAD:  Patient newly started on Clozaril on 7/6 and also on Cogentin and Risperidone. Has had intermittent tachycardia since 7/4. Asymptomatic. TSH WNL on 7/1. CMP today with stable Cr and lytes. CBC WNL 7/9. BP has been controlled and somewhat soft this admission. Had mild epistaxis today per RN which has resolved. He denies chest pain, palpitations, PND, orthopnea, or pain. Euvolemic on exam. Tolerating PO intake. Last ECHO 2016 w/ EF 50%. Had Cardiac CTA 1/19/12 with calcium score: 77. No congenital coronary anomalies are seen. Left main coronary: No stenosis. There is a large ramus intermedius, without stenosis. LAD: There is large calcified plaque at the origin of the second diagonal, causing moderate stenosis. LAD distal to the second diagonal is patent. Circumflex: Small vessel without stenosis. Right coronary artery: No stenosis.  The acute marginal, posterolateral and posterior descending arteries are patent.  Last saw Cardiology in 1/2020 who  recommended routine ECHO which patient has not yet done. No infectious signs or symptoms.   - Will obtain CBC now   - ECG ordered to ensure sinus rhythm     - ECG with sinus tachycardia with HR 115bpm, QTc 480 with RBBB    - Discussed ECG with Cardiology - there are no signs of new infarct (there was Q wave in lead III which can be normal variant, and V4 which could be lead placement in setting of RBBB).   - Monitor while on Clozaril, Cogentin and Risperidone as medication induced causes likely the culprit - Psychiatry to consider dose adjustment    - Given softer BP - may not tolerate Metoprolol. Will hold at this time and monitor HR  - Encourage fluids, please notify medicine if HR persistently >120bpm or symptomatic      Medicine will continue to follow. Please notify medicine with any additional questions or concerns.     Kim Harman PA-C  Hospitalist Service  Pager [phone]

## 2021-07-15 NOTE — PROGRESS NOTES
PSYCHIATRIC PROGRESS NOTE    Admission Date: 06/30/2021  Date of Service: 07/14/2021    The patient was seen, his chart reviewed, his case discussed with staff in the Team Meeting.  He has been quiet spending most of the time in his room getting out for meals only. He has been medication compliant. No grossly disruptive behavior was noted.  The patient has been committed and Jarvised with Clozaril recently added to his Yanez medication list which also included Haldol, Risperdal, Zyprexa and Invega.    The patient is a 55-year-old single white male with a lifelong history of mental illness, TBI, neurocognitive disorder and polysubstance abuse, history of multiple psychiatric hospitalizations and 3 MI Commitments as well as a history of one CD commitment who was transferred to [facility] from [facility] where he was seen for psychiatric consultation by Dr. Wilks who recommended transferring him here.  Initially the patient was seen by Dr. Morfin on 07/01/2021 and has been followed by Dr. Morfin and then by Dr. Dobbins since 07/08/2021.    MEDICATIONS  Clozaril 50 mg daily and 200 mg at bedtime  Cogentin 1 mg BID  Namenda 5 mg daily  Risperdal M-Tab 1 mg at bedtime  Ativan 0.5 - 1 mg Q4H PRN for anxiety  Melatonin 3 mg HS PRN  Risperdal M-Tab 1 mg Q6H PRN for agitation    LABS  No new results    VS: Pulse - 108, T - 97.6, BP - 118/93, Resp - 16, SpO2 - 97%    MENTAL STATUS EXAMINATION  Revealed somewhat disheveled 55-year old white male appearing older than his stated age. He was alert but only partially oriented. He presented with poverty of speech and paucity of ideas. His gait was unsteady. He appeared to be somewhat depressed but denied suicidal ideation or intent. No overt psychotic features were noted. He presented with significant cognitive impairment.  He had no insight into his problems and his judgement was impaired.    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 Discontinue Ativan as contributing to cognitive impairment. Increase Namenda to 5 mg BID. Continue Status 15, continue elopement and assault precautions.    Juarez Nickerson MD

## 2021-07-15 NOTE — PLAN OF CARE
Assessment/Intervention/Current Symtoms and Care Coordination  -Chart review  -Pre round meeting with team  -Rounded with team, addressed patient needs/concerns  -Post round meeting with team  Current Symptoms include the following: verbally intimidating      I left a voice mail for the TCM to return call to discuss the issue of the pt being on the MSOC list with no response from her.He called back and said he will call [facility].    I sent a message to the MSOC Coordinator.    I left vm's at nursing homes.      Nursing Home Work Sheet - [facility] Phone:  [phone]  Fax: [phone] Hard to place pt s 7/15/21 @ 4PM  I spoke with Admissions. They have one locked bed for a male who is vaccinated. Pt is not vaccinated. There is concern about his ability to get a vaccination without a decision maker. So no referral was made.   [facility] Phone: [phone]  7/15/21 @ 4:15PM  Liz in Admission says they have no locked bed and there is a lake right outside the facility.   [facility]  [address] [place], [place]  [place] @[phone]    [email or web address]  Jeremias gave me this contact as someone who agreed to take this pt.     7/12/21@2:15PM  Left vm for [place]ma   3:30PM  Received call back. She said they would take him with the commitment and the rep payee.   However after we hung up I realized I didn t clarify the setting type. I e-mailed her to get this information. It looks like it could be only customized living. She wrote back and said this:  This is a customized living facility with a 24/7 awake  staff. It has a potential for a surveillance camera placement but it s not a locked unit.   Thanks      Company:[employer]   [facility] [place] [address]      Transition Liaison:  Marilyn  [phone]  Fax: [phone]      Direct to Facility  [phone]    Jeremias gave me this contact as someone who agreed to take this  pt.     7/12/21@10:15AM   left vm for Marilyn  3:45 called again but did not leave vm    7/13/21@2pm   left vm. Marilyn called me back. She said that they had denied this case last year due to the pt s aggression. I explained the administrative proposal and that the pt may be improved since then. She agreed to talk to the  at [facility] re: the administrative proposal and I agrees to send notes to show then updated clinical information. I faxed notes.      7/15/21   @9:20AM  Left vm for Marilyn for an update  @9:25  Left vm for Becky in  on the 2nd floor  Marilyn called back and said that the  is still requiring that the pt have a guardian.   [facility]  [phone]  7/12/21@10:30AM   I reached Jimmy spencer and explained the situation. She said they still would not take the pt even if under a commitment and Yanez. She states it is a state mandate especially in a secure unit.     [facility]        No locked unit Pilar Concepcion    No [phone]  7/12/21@10:40AM   Pilar said they do not have a locked unit. She recommends their sister facility [facility] which has a wander-guard system. I discussed this with the provider and we both agree that the pt is not appropriate for a wander-guard system.    7/15/21 @1:45PM  ADAMARIS (Kurtis) is going to talk to [facility] about this client as we may have to consider other options since the locked secure units won t take him with out a decision maker.     [place], [place]      No locked unit, it is a secure unit with wanderguard [phone]  Cell: [phone]  7/12/21 @ 2:50PM  Could not reach the right staff  3:50PM left vm for admissions staff. They called back and said there is No locked unit, it is a secure unit with wanderguard     [place]      No locked unit   7/12/21 @ 3:05PM  Was told there was no locked unit   [facility]  Center      No locked unit [phone]  Fax:[phone]  7/12/21 @ 2:40PM  They do not have a locked unit.   [facility]    7/12/21 - Called,  Neurocognitive Program  no locked unit   [facility] [phone]  7/12/21 - Called  Neurocognitive Program. Left [place]     [facility] 218-  7/12/21 - Called,  Neurocognitive Program  no locked unit         Discharge Plan or Goal  Pending stabilization & development of a safe discharge plan.  Considerations include: locked nursing home    Barriers to Discharge  Patient requires further psychiatric stabilization due to current symptomology    Referral Status  locked nursing home    Legal Status  Patient is under MI commitment in [facility]

## 2021-07-16 LAB
ATRIAL RATE - MUSE: 115 BPM
BASOPHILS # BLD AUTO: 0.1 10E3/UL (ref 0–0.2)
BASOPHILS NFR BLD AUTO: 1 %
DIASTOLIC BLOOD PRESSURE - MUSE: NORMAL MMHG
EOSINOPHIL # BLD AUTO: 0.8 10E3/UL (ref 0–0.7)
EOSINOPHIL NFR BLD AUTO: 12 %
ERYTHROCYTE [DISTWIDTH] IN BLOOD BY AUTOMATED COUNT: 13.5 % (ref 10–15)
HCT VFR BLD AUTO: 43.4 % (ref 40–53)
HGB BLD-MCNC: 14.9 G/DL (ref 13.3–17.7)
IMM GRANULOCYTES # BLD: 0 10E3/UL
IMM GRANULOCYTES NFR BLD: 0 %
INTERPRETATION ECG - MUSE: NORMAL
LYMPHOCYTES # BLD AUTO: 1.5 10E3/UL (ref 0.8–5.3)
LYMPHOCYTES NFR BLD AUTO: 20 %
MCH RBC QN AUTO: 32.3 PG (ref 26.5–33)
MCHC RBC AUTO-ENTMCNC: 34.3 G/DL (ref 31.5–36.5)
MCV RBC AUTO: 94 FL (ref 78–100)
MONOCYTES # BLD AUTO: 0.6 10E3/UL (ref 0–1.3)
MONOCYTES NFR BLD AUTO: 8 %
NEUTROPHILS # BLD AUTO: 4.2 10E3/UL (ref 1.6–8.3)
NEUTROPHILS NFR BLD AUTO: 59 %
NRBC # BLD AUTO: 0 10E3/UL
NRBC BLD AUTO-RTO: 0 /100
P AXIS - MUSE: 56 DEGREES
PLATELET # BLD AUTO: 199 10E3/UL (ref 150–450)
PR INTERVAL - MUSE: 166 MS
QRS DURATION - MUSE: 112 MS
QT - MUSE: 348 MS
QTC - MUSE: 481 MS
R AXIS - MUSE: 39 DEGREES
RBC # BLD AUTO: 4.62 10E6/UL (ref 4.4–5.9)
SYSTOLIC BLOOD PRESSURE - MUSE: NORMAL MMHG
T AXIS - MUSE: 48 DEGREES
VENTRICULAR RATE- MUSE: 115 BPM
WBC # BLD AUTO: 7.1 10E3/UL (ref 4–11)

## 2021-07-16 PROCEDURE — 250N000013 HC RX MED GY IP 250 OP 250 PS 637: Performed by: PSYCHIATRY & NEUROLOGY

## 2021-07-16 PROCEDURE — 85025 COMPLETE CBC W/AUTO DIFF WBC: CPT | Performed by: PSYCHIATRY & NEUROLOGY

## 2021-07-16 PROCEDURE — 90853 GROUP PSYCHOTHERAPY: CPT

## 2021-07-16 PROCEDURE — 36415 COLL VENOUS BLD VENIPUNCTURE: CPT | Performed by: PSYCHIATRY & NEUROLOGY

## 2021-07-16 PROCEDURE — 99232 SBSQ HOSP IP/OBS MODERATE 35: CPT | Performed by: PSYCHIATRY & NEUROLOGY

## 2021-07-16 PROCEDURE — 124N000002 HC R&B MH UMMC

## 2021-07-16 RX ADMIN — BENZTROPINE MESYLATE 1 MG: 1 TABLET ORAL at 10:06

## 2021-07-16 RX ADMIN — GUAIFENESIN 600 MG: 600 TABLET, EXTENDED RELEASE ORAL at 21:39

## 2021-07-16 RX ADMIN — Medication 50 MCG: at 10:06

## 2021-07-16 RX ADMIN — MEMANTINE 5 MG: 5 TABLET ORAL at 10:06

## 2021-07-16 RX ADMIN — SALMETEROL XINAFOATE 1 PUFF: 50 POWDER, METERED ORAL; RESPIRATORY (INHALATION) at 21:40

## 2021-07-16 RX ADMIN — DOCUSATE SODIUM 100 MG: 100 CAPSULE, LIQUID FILLED ORAL at 10:06

## 2021-07-16 RX ADMIN — POLYETHYLENE GLYCOL 3350 17 G: 17 POWDER, FOR SOLUTION ORAL at 10:06

## 2021-07-16 RX ADMIN — BENZTROPINE MESYLATE 1 MG: 1 TABLET ORAL at 21:39

## 2021-07-16 RX ADMIN — CLOZAPINE 200 MG: 100 TABLET ORAL at 21:39

## 2021-07-16 RX ADMIN — ASPIRIN 81 MG CHEWABLE TABLET 81 MG: 81 TABLET CHEWABLE at 10:05

## 2021-07-16 RX ADMIN — RISPERIDONE 1 MG: 1 TABLET, ORALLY DISINTEGRATING ORAL at 21:39

## 2021-07-16 RX ADMIN — GUAIFENESIN 600 MG: 600 TABLET, EXTENDED RELEASE ORAL at 10:05

## 2021-07-16 RX ADMIN — DOCUSATE SODIUM 100 MG: 100 CAPSULE, LIQUID FILLED ORAL at 21:39

## 2021-07-16 RX ADMIN — SALMETEROL XINAFOATE 1 PUFF: 50 POWDER, METERED ORAL; RESPIRATORY (INHALATION) at 10:07

## 2021-07-16 RX ADMIN — CLOZAPINE 50 MG: 25 TABLET ORAL at 10:06

## 2021-07-16 RX ADMIN — MEMANTINE 5 MG: 5 TABLET ORAL at 21:39

## 2021-07-16 RX ADMIN — LEVOTHYROXINE SODIUM 88 MCG: 88 TABLET ORAL at 10:05

## 2021-07-16 ASSESSMENT — ACTIVITIES OF DAILY LIVING (ADL)
HYGIENE/GROOMING: INDEPENDENT
ORAL_HYGIENE: INDEPENDENT
LAUNDRY: WITH SUPERVISION
DRESS: INDEPENDENT

## 2021-07-16 NOTE — PLAN OF CARE
"Music Therapy Group note    Total time in session: 35-40 minutes    Number of patients in group: 5-7    Scope of service: psychodynamic     Patient progress: initial encounter    Intervention: Music [place]     Goal of group: relaxation, improve mood, reminiscence     Patient response/reaction to treatment intervention(s): John's speech was difficult to understand, but he was the first person to chose a song for today's \"Music [place]\".  He selected [employer] Halen, \"anything\" he said.  MT played \"Right Now\" a piano driven piece that he approved of about living in the moment.  John was a calm and quiet participant.  He left group and came back twice.  His mood appeared to improve with the music.     Luba Paniagua, MT-BC  Board-Certified Music Therapist           "

## 2021-07-16 NOTE — PROGRESS NOTES
PSYCHIATRIC PROGRESS NOTE    Admission Date: 06/30/2021  Date of Service: 07/16/2021    The patient was seen, his chart reviewed, his case discussed with staff at the Team Meeting  He continued to be isolative and withdrawn spending most of the time in his room. He has been pleasant and polite upon approach. He has been medication compliant.  He has been committed and Jarvised.    John is a 55 year old single white male with a lifelong history of mental illness, TBI, neurocognitive disorder and polysubstance abuse, history of three MI commitments and one CD commitment who was transferred here from [facility] where he has been since 09/09/2020 after he was seen for psychiatric consultation by Dr. Wilks .  Here he was initially seen and followed by Dr. Morfin and since 07/08/21 -  by Dr. Dobbins.    MEDICATIONS  Clozaril 50 mg QAM and 200 mg at bedtime  Risperdal M-Tab 1 mg at bedtime and 1 mg Q6H PRN for agitation  Cogentin 1 mg BID  Namenda 5 mg BID  Melatonin 3 mg HS PRN    LABS  CBC was WNL except for slightly increased Absolute Eosinophils of 8  His INR was 0.97    ECG OF 07/15/21 was abnormal revealing Sinus Tachycardia with Right Bundle Branch Block with minimal criteria for inferior infarct    VS: T - 97.7, Pulse 109, BP - 116/80, Resp - 16, SpO2 - 96%    MENTAL STATUS EXAMINATION   Revealed a normally built and normally developed, somewhat disheveled 55 year old white male. He was alert but only partially oriented. His speech was rumbling and difficult to understand. He appeared to be somewhat depressed with blunted affect. No overt psychotic features were noted or elicited. He presented with marked impairment of memory and cognition. He had no insight into his problems and his judgement was impaired.    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 Continue Status 15 as well as assault and elopement precautions.    Juarez Nickerson MD

## 2021-07-16 NOTE — PLAN OF CARE
Problem: Cognitive Impairment (Psychotic Signs/Symptoms)  Goal: Optimal Cognitive Function (Psychotic Signs/Symptoms)  Outcome: No Change  Intervention: Support and Promote Cognitive Ability  Recent Flowsheet Documentation  Taken 7/15/2021 1913 by Laura Mason RN  Trust Relationship/Rapport:   care explained   choices provided   emotional support provided   empathic listening provided   questions answered   questions encouraged   reassurance provided   thoughts/feelings acknowledged    John had his usual evening. Was in and out of his room and watched some television or peers in the lounge. Ate well for supper. Participated in evening programing. Kept to himself. Little to no conversation with peers or staff. Nursing to continue to support current plan of care.

## 2021-07-16 NOTE — PLAN OF CARE
Problem: Behavioral Health Plan of Care  Goal: Adheres to Safety Considerations for Self and Others  Outcome: Improving   Pt slept approximately 7 hours tonight.  He voiced no concerns when awake.

## 2021-07-16 NOTE — PLAN OF CARE
Assessment/Intervention/Current Symtoms and Care Coordination  -Chart review  -Pre round meeting with team  -Rounded with team, addressed patient needs/concerns  -Post round meeting with team  Current Symptoms include the following: disorganization, oriented to 1  Pt is med compliant and responds to direction.  No side effects noted with the Clozaril.      I approached the [facility] Care Team to reconvene for internal care conference to discuss next steps.To recap it is appearing to me that the facilities we are interested in are still requiring a guardian or decision maker to accept the referral. They are not agreeing to the plan that Camacho Gonzalez suggested - placing the nursing facility on the commitment order with the promise of taking the pt back if he is not clinically stable there. I came to realize yesterday that we might be in the same boat about treating him for non-Yanez meds and interventions without a decision maker.        Discharge Plan or Goal  Locked nursing home    Barriers to Discharge  Pt does not have a decision  maker      Referral Status  Locked nursing home won't take the pt without a guardian in place      Legal Status  Patient is under MI commitment in [facility]

## 2021-07-16 NOTE — PLAN OF CARE
Patient is isolative and withdrawn. Stays mostly in his room sleeping. Is cooperative and medication complaint. Appetite is good.

## 2021-07-16 NOTE — PLAN OF CARE
BEHAVIORAL TEAM DISCUSSION    Participants:   Dr. Nickerson, eMlissa Fang [facility] HospitalSW, Milena Laguna SW, Bárbara Zimmerman RN      Progress:   Pt has been here 16 days.  Pt is oriented only to self.  Pt does not have a decision maker.  Pt does not attend group programming.  Pt has responded to encouragement to join in the unit events.  Pt needs nursing care.  Locked nursing facilities are being explored but they want the pt to have a guardian. 5 family members and 25 agencies have been explored and they have all declined to take on the guardianship. responsibility.  Pt does not have the capacity to consent to his care.  One facility would not accept the referral unless he is vaccinated but he is not and it is thought that he does not have the capacity to consent to this.    Anticipated length of stay:   3 months    Continued Stay Criteria/Rationale:   The pt needs a care facility to be discharged to.    Medical/Physical:   Pt has been incontinent of urine      Precautions:   Behavioral Orders   Procedures     Assault precautions     Code 2     Elopement precautions     Routine Programming     As clinically indicated     Single Room     Status 15     Every 15 minutes.     Plan:   Encourage pt to come out of room and join in groups and socialization  in the lounge.  Clozaril Management  Continue to discuss issue of need for guardianship      Rationale for change in precautions or plan: no changes

## 2021-07-17 LAB — SARS-COV-2 RNA RESP QL NAA+PROBE: NEGATIVE

## 2021-07-17 PROCEDURE — 250N000013 HC RX MED GY IP 250 OP 250 PS 637: Performed by: PSYCHIATRY & NEUROLOGY

## 2021-07-17 PROCEDURE — 124N000002 HC R&B MH UMMC

## 2021-07-17 PROCEDURE — 87635 SARS-COV-2 COVID-19 AMP PRB: CPT | Performed by: PSYCHIATRY & NEUROLOGY

## 2021-07-17 RX ADMIN — Medication 50 MCG: at 08:57

## 2021-07-17 RX ADMIN — GUAIFENESIN 600 MG: 600 TABLET, EXTENDED RELEASE ORAL at 08:57

## 2021-07-17 RX ADMIN — CLOZAPINE 50 MG: 25 TABLET ORAL at 08:57

## 2021-07-17 RX ADMIN — BENZTROPINE MESYLATE 1 MG: 1 TABLET ORAL at 21:04

## 2021-07-17 RX ADMIN — CLOZAPINE 200 MG: 100 TABLET ORAL at 21:04

## 2021-07-17 RX ADMIN — POLYETHYLENE GLYCOL 3350 17 G: 17 POWDER, FOR SOLUTION ORAL at 08:57

## 2021-07-17 RX ADMIN — BENZTROPINE MESYLATE 1 MG: 1 TABLET ORAL at 08:57

## 2021-07-17 RX ADMIN — RISPERIDONE 1 MG: 1 TABLET, ORALLY DISINTEGRATING ORAL at 21:03

## 2021-07-17 RX ADMIN — MEMANTINE 5 MG: 5 TABLET ORAL at 08:57

## 2021-07-17 RX ADMIN — DOCUSATE SODIUM 100 MG: 100 CAPSULE, LIQUID FILLED ORAL at 21:03

## 2021-07-17 RX ADMIN — SALMETEROL XINAFOATE 1 PUFF: 50 POWDER, METERED ORAL; RESPIRATORY (INHALATION) at 21:06

## 2021-07-17 RX ADMIN — GUAIFENESIN 600 MG: 600 TABLET, EXTENDED RELEASE ORAL at 21:03

## 2021-07-17 RX ADMIN — ASPIRIN 81 MG CHEWABLE TABLET 81 MG: 81 TABLET CHEWABLE at 08:57

## 2021-07-17 RX ADMIN — MEMANTINE 5 MG: 5 TABLET ORAL at 21:03

## 2021-07-17 RX ADMIN — DOCUSATE SODIUM 100 MG: 100 CAPSULE, LIQUID FILLED ORAL at 08:57

## 2021-07-17 RX ADMIN — LEVOTHYROXINE SODIUM 88 MCG: 88 TABLET ORAL at 08:57

## 2021-07-17 ASSESSMENT — ACTIVITIES OF DAILY LIVING (ADL)
HYGIENE/GROOMING: INDEPENDENT
ORAL_HYGIENE: INDEPENDENT
LAUNDRY: WITH SUPERVISION
DRESS: INDEPENDENT
DRESS: INDEPENDENT
LAUNDRY: WITH SUPERVISION
ORAL_HYGIENE: INDEPENDENT
HYGIENE/GROOMING: INDEPENDENT

## 2021-07-17 NOTE — PLAN OF CARE
Problem: Psychotic Symptoms  Goal: Social and Therapeutic (Psychotic Symptoms)  Description: Signs and symptoms of listed problems will be absent or manageable.  Recent Flowsheet Documentation  Taken 7/17/2021 1342 by Tess Vaca RN  Psychotic Symptoms Assessed: all  Psychotic Symptoms Present:   mood   insight   orientation   thought process   memory deficits   Blunted affect, mood calm, spends a lot of time in room.  He comes out for meals with a good appetite. He was agreeable today for shower and a change of clothes. Oakley x 1, needed to be told where he was.  He was compliant with medications, and covid test.

## 2021-07-17 NOTE — PLAN OF CARE
Night Shift Summary (7/16/21 into 07/17/21)    Pt in bed sleeping at start of shift. Breathing quiet and unlabored. Appears to have slept 7 hours.    Assault and Elopement precautions in addition to Single Room order, with no related events occurring this shift.     Will continue to monitor and assess.       Problem: Behavioral Health Plan of Care  Goal: Absence of New-Onset Illness or Injury  Outcome: Improving  Remains safe on the unit.      Problem: Sleep Disturbance  Goal: Adequate Sleep/Rest  Outcome: Improving   Slept ? 6 hours.

## 2021-07-17 NOTE — PROGRESS NOTES
" 07/16/21 1900   Groups   Details    (Psychotherapy)   Number of patients attending the group:  3  Group Length: 1 hour     Group Therapy Type: Psychotherapy     Summary of Group / Topics Discussed:        The  Psychotherapy group goal is to promote insight to positive choice and change. Group processing is within a supportive and safe environment. Patients will process emotions using verbal group and expressive psychotherapy interventions including visual art/writing interventions.     Group interventions support patients by: creative self expression, self esteem,communication/social skills and supports, self efficacy/empowerment and emotional regulation     Modalities to reach these goals include: Affirmation, Expressive Arts Therapies and Mindfulness practices     Subjective -patient report of mood today- he didn't say a mood, but did have a narrative below     Objective/ Intervention- Goal of group and Therapeutic modality utilized- tempera paint sticks and tape - affirmation poster     Group Response- engaged     Patient Response- Pt  was engaged in conversation in group for a short time. He didn't want to make art. He was difficult to understand when he spoke, but was basically saying he wanted to leave. He said he wanted to convey this to the doctors who think that he is homeless but that he has sisters whom he can stay with. He also talked about having two SSN cards. He offered to the affirmation conversation \" It's not the end of the world.\"    Azael Valdovinos, LMFT, ATR-BC                 "

## 2021-07-17 NOTE — PLAN OF CARE
Patient has spent majority of the shift in the milieu. Keeps to himself. Appears distracted. Patient denies suicidal ideation and self injurious thoughts. Reported having some anxiety and depression. Med compliant. Ate dinner and snack. Med compliant.    Patient evaluation continues. Assessed mood,anxiety,thoughts and behavior.     Patient gradually progressing towards goals.    Patient is encouraged to participate in groups and assisted to develop healthy coping skills.     VS reviewed: /80 (BP Location: Right arm)   Pulse 109   Temp 97.7  F (36.5  C) (Oral)   Resp 16   Ht 1.829 m (6')   Wt 89.3 kg (196 lb 12.8 oz)   SpO2 96%   BMI 26.69 kg/m      Length of stay: 17    Refer to daily team meeting notes for individualized plan of care. Nursing will continue to assess.

## 2021-07-18 LAB
ALBUMIN SERPL-MCNC: 3.1 G/DL (ref 3.4–5)
ALP SERPL-CCNC: 69 U/L (ref 40–150)
ALT SERPL W P-5'-P-CCNC: 66 U/L (ref 0–70)
ANION GAP SERPL CALCULATED.3IONS-SCNC: 5 MMOL/L (ref 3–14)
AST SERPL W P-5'-P-CCNC: 89 U/L (ref 0–45)
BILIRUB SERPL-MCNC: 0.4 MG/DL (ref 0.2–1.3)
BUN SERPL-MCNC: 16 MG/DL (ref 7–30)
CALCIUM SERPL-MCNC: 8.4 MG/DL (ref 8.5–10.1)
CHLORIDE BLD-SCNC: 111 MMOL/L (ref 94–109)
CO2 SERPL-SCNC: 22 MMOL/L (ref 20–32)
CREAT SERPL-MCNC: 0.8 MG/DL (ref 0.66–1.25)
GFR SERPL CREATININE-BSD FRML MDRD: >90 ML/MIN/1.73M2
GLUCOSE BLD-MCNC: 122 MG/DL (ref 70–99)
HCV RNA SERPL NAA+PROBE-ACNC: ABNORMAL IU/ML
HCV RNA SERPL NAA+PROBE-LOG IU: 5.9 {LOG_IU}/ML
HOLD SPECIMEN: NORMAL
POTASSIUM BLD-SCNC: 3.8 MMOL/L (ref 3.4–5.3)
PROT SERPL-MCNC: 7.8 G/DL (ref 6.8–8.8)
SODIUM SERPL-SCNC: 138 MMOL/L (ref 133–144)

## 2021-07-18 PROCEDURE — 250N000013 HC RX MED GY IP 250 OP 250 PS 637: Performed by: PSYCHIATRY & NEUROLOGY

## 2021-07-18 PROCEDURE — 36415 COLL VENOUS BLD VENIPUNCTURE: CPT | Performed by: PHYSICIAN ASSISTANT

## 2021-07-18 PROCEDURE — 124N000002 HC R&B MH UMMC

## 2021-07-18 PROCEDURE — 80053 COMPREHEN METABOLIC PANEL: CPT | Performed by: PHYSICIAN ASSISTANT

## 2021-07-18 RX ADMIN — GUAIFENESIN 600 MG: 600 TABLET, EXTENDED RELEASE ORAL at 20:04

## 2021-07-18 RX ADMIN — SALMETEROL XINAFOATE 1 PUFF: 50 POWDER, METERED ORAL; RESPIRATORY (INHALATION) at 08:57

## 2021-07-18 RX ADMIN — GUAIFENESIN 600 MG: 600 TABLET, EXTENDED RELEASE ORAL at 08:55

## 2021-07-18 RX ADMIN — BENZTROPINE MESYLATE 1 MG: 1 TABLET ORAL at 20:04

## 2021-07-18 RX ADMIN — BENZTROPINE MESYLATE 1 MG: 1 TABLET ORAL at 08:57

## 2021-07-18 RX ADMIN — ATORVASTATIN CALCIUM 80 MG: 80 TABLET, FILM COATED ORAL at 20:04

## 2021-07-18 RX ADMIN — MEMANTINE 5 MG: 5 TABLET ORAL at 08:56

## 2021-07-18 RX ADMIN — MEMANTINE 5 MG: 5 TABLET ORAL at 20:04

## 2021-07-18 RX ADMIN — CLOZAPINE 200 MG: 100 TABLET ORAL at 20:04

## 2021-07-18 RX ADMIN — CLOZAPINE 50 MG: 25 TABLET ORAL at 08:56

## 2021-07-18 RX ADMIN — Medication 50 MCG: at 08:56

## 2021-07-18 RX ADMIN — SALMETEROL XINAFOATE 1 PUFF: 50 POWDER, METERED ORAL; RESPIRATORY (INHALATION) at 20:05

## 2021-07-18 RX ADMIN — ASPIRIN 81 MG CHEWABLE TABLET 81 MG: 81 TABLET CHEWABLE at 08:56

## 2021-07-18 RX ADMIN — DOCUSATE SODIUM 100 MG: 100 CAPSULE, LIQUID FILLED ORAL at 20:04

## 2021-07-18 RX ADMIN — RISPERIDONE 1 MG: 1 TABLET, ORALLY DISINTEGRATING ORAL at 20:05

## 2021-07-18 RX ADMIN — LEVOTHYROXINE SODIUM 88 MCG: 88 TABLET ORAL at 08:56

## 2021-07-18 RX ADMIN — POLYETHYLENE GLYCOL 3350 17 G: 17 POWDER, FOR SOLUTION ORAL at 08:57

## 2021-07-18 RX ADMIN — DOCUSATE SODIUM 100 MG: 100 CAPSULE, LIQUID FILLED ORAL at 08:56

## 2021-07-18 ASSESSMENT — ACTIVITIES OF DAILY LIVING (ADL)
HYGIENE/GROOMING: HANDWASHING;SHOWER;INDEPENDENT
DRESS: SCRUBS (BEHAVIORAL HEALTH);INDEPENDENT
HYGIENE/GROOMING: INDEPENDENT
ORAL_HYGIENE: INDEPENDENT
ORAL_HYGIENE: INDEPENDENT
LAUNDRY: WITH SUPERVISION
LAUNDRY: WITH SUPERVISION
DRESS: INDEPENDENT

## 2021-07-18 NOTE — PROGRESS NOTES
Brief Medicine Note    Medicine following peripherally for CMP for LFT monitoring. LFTs improved today: AST 89 (133), ALT 66 (93), T bili and ALP WNL. Hepatitis C RNA pending (has history of hepatitis C s/p treatment ~25 years ago).     HR reviewed and have improved - currently 99bpm.    A/P:  # Transaminitis  # History of Hepatitis C s/p treatment (reported)  # Hepatosteatosis vs cirrhosis:   Has had LFT elevations ALT dating back to 2/2020 and AST dating back to 2014. He has had slowly rising LFTs since December. Asymptomatic. Edith Dorado PA-C reviewed with pharmacy, and possible medications causing would be Haldol, depakote, and statin. Discussed with psych who will taper haldol and Depakote. Holding statin, (total cholesterol 91, with LDL 41). Ultrasound this admission 7/5 with hepatosteatosis vs  cirrhosis without focal liver lesion.  No evidence of HCC.  The splenic and portal veins all antegrade in flow.  The patient did end the exam and refused to continue. Hepatitis C was reactive. Hepatitis B non-reactive. Patient reports a history of hepatitis C infection that was treated 25 years ago that he got from his girlfriend. He is asymptomatic. No RUQ tenderness. LFTs improved today: AST 89 (133), ALT 66 (93), T bili and ALP WNL. INR 0.97.   - HCV RNA pending   - Given concern for cirrhosis, will need referral to Hepatology upon discharge for follow-up   - Resume PTA Atorvastatin 80mg daily today given improvement in LFTs  - Trend LFTs on 7/21      # Tachycardia  # History of infectious endocarditis (2015) of mitral and aortic valves with severe regurgitation s/p replacement.   # History of HFrEF, resolved (EF 50% in 2016)  # Non-obstructive CAD:  Please see note from 7/15 for details. HR now improved to 99bpm. Labs from 7/15 unremarkable. ECG 7/15 reviewed with Cardiology and ECG with sinus tachycardia with HR 115bpm, QTc 480 with RBBB.   - Monitor while on Clozaril, Cogentin and Risperidone  - Encourage  fluids, please notify medicine if HR persistently >120bpm or symptomatic       Medicine will continue to follow-up on HCV RNA and CMP on 7/21 peripherally. Please notify medicine with any additional questions or concerns.     Kim Harman PA-C  Hospitalist Service  Pager [phone]

## 2021-07-18 NOTE — PLAN OF CARE
Night Shift Summary (7/17/21 into 07/18/21)    Pt in bed sleeping at start of shift. Breathing quiet and unlabored. Appears to have slept 7 hours.    Assault and Elopement precautions in addition to Single Room order, with no related events occurring this shift.     Will continue to monitor and assess.       Problem: Behavioral Health Plan of Care  Goal: Absence of New-Onset Illness or Injury  Outcome: Improving     Problem: Sleep Disturbance  Goal: Adequate Sleep/Rest  Outcome: Improving

## 2021-07-18 NOTE — PLAN OF CARE
Patient has spent majority of the shift in the milieu.  Patient denies suicidal ideation and self injurious thoughts. Reports being depressed about being here. Denies anxiety. Denies auditory and visual hallucinations. Slow to respond and his speech is low. Med compliant. Affect is flat. Cooperative on unit.     Patient evaluation continues. Assessed mood,anxiety,thoughts and behavior.     Patient gradually progressing towards goals.    Patient is encouraged to participate in groups and assisted to develop healthy coping skills.     VS reviewed: BP (!) 128/94 (BP Location: Right arm)   Pulse 99   Temp 97.4  F (36.3  C) (Oral)   Resp 16   Ht 1.829 m (6')   Wt 89.3 kg (196 lb 12.8 oz)   SpO2 94%   BMI 26.69 kg/m      Length of stay: 17    Refer to daily team meeting notes for individualized plan of care. Nursing will continue to assess.

## 2021-07-18 NOTE — PLAN OF CARE
"Pt is resting in his room; bkfst brought into him. He was incontinent of urine, this am. His bed was changed and he was given new clothing. He said he will shower, today. Pt's affect is rather flat, his mood is \"neutral.\" He rates depression 2; and reports he is not hopeful. Denies si. See PCS for shift asmnt.    1404) Up walking in the [place]z a bit, this afternoon. Non eventful shift.  "

## 2021-07-19 ENCOUNTER — TELEPHONE (OUTPATIENT)
Dept: GASTROENTEROLOGY | Facility: CLINIC | Age: 56
End: 2021-07-19

## 2021-07-19 PROCEDURE — 250N000013 HC RX MED GY IP 250 OP 250 PS 637: Performed by: PSYCHIATRY & NEUROLOGY

## 2021-07-19 PROCEDURE — 124N000002 HC R&B MH UMMC

## 2021-07-19 PROCEDURE — 99232 SBSQ HOSP IP/OBS MODERATE 35: CPT | Performed by: PHYSICIAN ASSISTANT

## 2021-07-19 PROCEDURE — 99231 SBSQ HOSP IP/OBS SF/LOW 25: CPT | Performed by: PSYCHIATRY & NEUROLOGY

## 2021-07-19 PROCEDURE — 99207 PR CDG-MDM COMPONENT: MEETS MODERATE - UP CODED: CPT | Performed by: PHYSICIAN ASSISTANT

## 2021-07-19 RX ORDER — MEMANTINE HYDROCHLORIDE 5 MG/1
10 TABLET ORAL 2 TIMES DAILY
Status: DISCONTINUED | OUTPATIENT
Start: 2021-07-19 | End: 2021-10-18

## 2021-07-19 RX ORDER — CLOZAPINE 100 MG/1
300 TABLET ORAL AT BEDTIME
Status: DISCONTINUED | OUTPATIENT
Start: 2021-07-19 | End: 2021-09-24

## 2021-07-19 RX ADMIN — SALMETEROL XINAFOATE 1 PUFF: 50 POWDER, METERED ORAL; RESPIRATORY (INHALATION) at 08:35

## 2021-07-19 RX ADMIN — DOCUSATE SODIUM 100 MG: 100 CAPSULE, LIQUID FILLED ORAL at 08:34

## 2021-07-19 RX ADMIN — MEMANTINE 10 MG: 5 TABLET ORAL at 21:50

## 2021-07-19 RX ADMIN — MEMANTINE 5 MG: 5 TABLET ORAL at 08:34

## 2021-07-19 RX ADMIN — GUAIFENESIN 600 MG: 600 TABLET, EXTENDED RELEASE ORAL at 08:34

## 2021-07-19 RX ADMIN — Medication 50 MCG: at 08:35

## 2021-07-19 RX ADMIN — BENZTROPINE MESYLATE 1 MG: 1 TABLET ORAL at 08:34

## 2021-07-19 RX ADMIN — POLYETHYLENE GLYCOL 3350 17 G: 17 POWDER, FOR SOLUTION ORAL at 08:34

## 2021-07-19 RX ADMIN — BENZTROPINE MESYLATE 1 MG: 1 TABLET ORAL at 21:50

## 2021-07-19 RX ADMIN — LEVOTHYROXINE SODIUM 88 MCG: 88 TABLET ORAL at 08:34

## 2021-07-19 RX ADMIN — SALMETEROL XINAFOATE 1 PUFF: 50 POWDER, METERED ORAL; RESPIRATORY (INHALATION) at 21:51

## 2021-07-19 RX ADMIN — GUAIFENESIN 600 MG: 600 TABLET, EXTENDED RELEASE ORAL at 21:50

## 2021-07-19 RX ADMIN — ACETAMINOPHEN 650 MG: 325 TABLET, FILM COATED ORAL at 15:58

## 2021-07-19 RX ADMIN — ACETAMINOPHEN 650 MG: 325 TABLET, FILM COATED ORAL at 08:43

## 2021-07-19 RX ADMIN — DOCUSATE SODIUM 100 MG: 100 CAPSULE, LIQUID FILLED ORAL at 21:50

## 2021-07-19 RX ADMIN — ATORVASTATIN CALCIUM 80 MG: 80 TABLET, FILM COATED ORAL at 21:50

## 2021-07-19 RX ADMIN — ASPIRIN 81 MG CHEWABLE TABLET 81 MG: 81 TABLET CHEWABLE at 08:34

## 2021-07-19 RX ADMIN — CLOZAPINE 300 MG: 100 TABLET ORAL at 21:50

## 2021-07-19 RX ADMIN — CLOZAPINE 50 MG: 25 TABLET ORAL at 08:34

## 2021-07-19 NOTE — PLAN OF CARE
Night Shift Summary (7/18/21 into 07/19/21)     Pt in bed sleeping at start of shift. Breathing quiet and unlabored. Appears to have slept 6.75 hours.     Assault and Elopement precautions in addition to Single Room order, with no related events occurring this shift.      Will continue to monitor and assess.       Problem: Behavioral Health Plan of Care  Goal: Absence of New-Onset Illness or Injury  Outcome: Improving   Remains safe on the unit.     Problem: Sleep Disturbance  Goal: Adequate Sleep/Rest  Outcome: Improving   Slept ? 6 hours.

## 2021-07-19 NOTE — PLAN OF CARE
Problem: Psychotic Symptoms  Goal: Psychotic Symptoms  Description: Signs and symptoms of listed problems will be absent or manageable.  Outcome: No Change            Pt spent the entire shift in his room and coming out for meals only.  Pt ate his meals.  Med complaint.  Pt denies auditory and visual hallucinations.  Pt denies SI/SIB.  Affect is flat blunted.  Pt reported having a headache, tylenol prn administered with some relief.  Pulse  elevated at 126 and 122.  IM is aware.  Rechecked again and it came down to 108.  Pt has been co-operative on the unit.  Will continue to assess.

## 2021-07-19 NOTE — PROGRESS NOTES
PSYCHIATRIC PROGRESS NOTE    The patient was seen, his chart reviewed, his case discussed with staff at the Team Meeting  Reportedly, he continued to spent a lot of time in his room but was seen in the [facility] more often. He slept about 7 hours at night. He has been medication compliant.    This is a 55-year-old single white male with a lifelong history of mental illness, TBI, neurocognitive disorder and polysubstance abuse. He has a history of 3 MI commitments and one CD commitment. This time he also was committed and Jarvised. He had spent several months at [facility] and was transferred here after being seen by Dr. Wilks for psychiatric consultation.  He was initially seen and followed by Dr. Morfin and has been followed by Dr. Dobbins since 07/08/21.    MEDICATIONS;  Clozaril 50 mg QAM and 200 mg at bedtime  Risperdal M-Tab 1 mg at bedtime and 1 mg Q6H PRN for agitation  Cogentin 1 mg BID  Namenda 5 mg BID  Melatonin 3 mg HS PRN    LABS  As of 07/18/21 his Blood Sugar was 122  His blood Chemistry was remarkable for increased Chloride of 111, decreased Calcium of 8.4, decreased Albumin of 3.1 and elevated AST at 89    VS: Pulse - 110, T - 97.8, BP - 115/82, Resp - 16, SpO2 - 95%    MENTAL STATUS EXAMINATION  Revealed a normally built and normally developed, disheveled 55-year-old white male appearing older than his stated age. He was alert but only partially oriented. His speech was rambling and difficult to understand. He presented with blunted affect. He denied suicidal thoughts. He presented with marked impairment of memory and cognition. 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 I will optimize NNamenta by increasing the dose to 10 mg BID.    Juarez Nickerson MD

## 2021-07-19 NOTE — PLAN OF CARE
Patient has spent majority of the shift in the milieu. Keeps to herself. Affect is flat. Appears distracted. Approached staff and notified them he would be leaving tonight. Patient denies suicidal ideation and self injurious thoughts. Denies anxiety and depression. Denies auditory and visual hallucinations. Ate dinner and snack. Cooperative on unit.     Patient evaluation continues. Assessed mood,anxiety,thoughts and behavior.     Patient gradually progressing towards goals.    Patient is encouraged to participate in groups and assisted to develop healthy coping skills.     VS reviewed: BP (!) 133/96   Pulse 114   Temp 98.3  F (36.8  C) (Oral)   Resp 16   Ht 1.829 m (6')   Wt 89.3 kg (196 lb 12.8 oz)   SpO2 96%   BMI 26.69 kg/m      Length of stay: 18    Refer to daily team meeting notes for individualized plan of care. Nursing will continue to assess.

## 2021-07-19 NOTE — PROGRESS NOTES
Brief medicine note:     Followed up on patients transaminitis and Hep C RNA.     Hepatitis C   Transaminitis   Hep C RNA reactive at 722,158, log 5.9. Patient reports hx of hep c that was treated about 25 years ago. He is unsure if it was fully treated.  Repeat LFTs on 7/21.    - Given positive Hep C, will need close follow up with hepatology on discharge to discuss treatment    - Provided education on ways to reduce transmission   - PTA atorvastatin resumed 7/18. Follow up repeat LFTs on 7/21     Tachycardia   History of infectious endocarditis (2015) of mitral and aortic valves with severe regurgitation s/p replacement.   History of HFrEF, resolved (EF 50% in 2016)  Non-obstructive CAD   Continues to have intermittent tachycardia. 's this AM. BP stable. Labs from 7/18 unremarkable. EKG 7/15 with sinus tachycardia, RBBB. Suspect medications effect. Patient denies lightheadedness, dizziness or chest pain.   - Monitor on Clozaril, Cogentin and Risperidone   - Encourage fluids, please notify medicine if HR persistently > 120 BPM or symptomatic    - May consider starting low dose metoprolol if tachycardia persists and BP will allow     Today's vital signs, medications and nursing notes were reviewed.     /78 (BP Location: Left arm)   Pulse (!) 122   Temp 98.4  F (36.9  C) (Oral)   Resp 16   Ht 1.829 m (6')   Wt 89.3 kg (196 lb 12.8 oz)   SpO2 97%   BMI 26.69 kg/m      GENERAL: Alert, mildly agitated.   HEENT: Anicteric sclera. Mucous membranes moist and without lesions.   CV: tachycardia. S1, S2. 3/6 murmurs appreciated.   RESPIRATORY: Effort normal on RA. Lungs CTAB with no wheezing, rales, rhonchi anteriorly.    GI: Abdomen soft and non distended. No tenderness, rebound, guarding.   SKIN: No jaundice. No rashes.       Medicine will continue to follow peripherally for repeat LFTs 7/21.     No Mays PA-C  Internal Medicine [facility] Hospitalist   Contact information available via [facility]  Paging/Directory

## 2021-07-19 NOTE — PLAN OF CARE
Assessment/Intervention/Current Symtoms and Care Coordination  WR attended team and reviewed chart. PT has been in the hospital for a very long time and appears to have lack of available placement at this time.     Discharge Plan or Goal  Goal is locked nursing facility however this does not seem possible without guardianship     Barriers to Discharge   Pt requires a legal decision maker and cannot access locked nursing home until this happens     Referral Status  None at this time     Legal Status  Commitment/Yanez

## 2021-07-20 PROCEDURE — 250N000013 HC RX MED GY IP 250 OP 250 PS 637: Performed by: PSYCHIATRY & NEUROLOGY

## 2021-07-20 PROCEDURE — 124N000002 HC R&B MH UMMC

## 2021-07-20 PROCEDURE — H2032 ACTIVITY THERAPY, PER 15 MIN: HCPCS

## 2021-07-20 RX ADMIN — SALMETEROL XINAFOATE 1 PUFF: 50 POWDER, METERED ORAL; RESPIRATORY (INHALATION) at 10:46

## 2021-07-20 RX ADMIN — BENZTROPINE MESYLATE 1 MG: 1 TABLET ORAL at 10:38

## 2021-07-20 RX ADMIN — DOCUSATE SODIUM 100 MG: 100 CAPSULE, LIQUID FILLED ORAL at 10:38

## 2021-07-20 RX ADMIN — GUAIFENESIN 600 MG: 600 TABLET, EXTENDED RELEASE ORAL at 20:43

## 2021-07-20 RX ADMIN — MEMANTINE 10 MG: 5 TABLET ORAL at 20:43

## 2021-07-20 RX ADMIN — POLYETHYLENE GLYCOL 3350 17 G: 17 POWDER, FOR SOLUTION ORAL at 10:39

## 2021-07-20 RX ADMIN — DOCUSATE SODIUM 100 MG: 100 CAPSULE, LIQUID FILLED ORAL at 20:43

## 2021-07-20 RX ADMIN — CLOZAPINE 300 MG: 100 TABLET ORAL at 20:43

## 2021-07-20 RX ADMIN — BENZTROPINE MESYLATE 1 MG: 1 TABLET ORAL at 20:43

## 2021-07-20 RX ADMIN — MEMANTINE 10 MG: 5 TABLET ORAL at 10:37

## 2021-07-20 RX ADMIN — ASPIRIN 81 MG CHEWABLE TABLET 81 MG: 81 TABLET CHEWABLE at 10:38

## 2021-07-20 RX ADMIN — ATORVASTATIN CALCIUM 80 MG: 80 TABLET, FILM COATED ORAL at 20:43

## 2021-07-20 RX ADMIN — GUAIFENESIN 600 MG: 600 TABLET, EXTENDED RELEASE ORAL at 10:37

## 2021-07-20 RX ADMIN — SALMETEROL XINAFOATE 1 PUFF: 50 POWDER, METERED ORAL; RESPIRATORY (INHALATION) at 20:45

## 2021-07-20 RX ADMIN — LEVOTHYROXINE SODIUM 88 MCG: 88 TABLET ORAL at 10:38

## 2021-07-20 RX ADMIN — Medication 50 MCG: at 10:37

## 2021-07-20 ASSESSMENT — ACTIVITIES OF DAILY LIVING (ADL)
DRESS: INDEPENDENT
ORAL_HYGIENE: INDEPENDENT
ORAL_HYGIENE: INDEPENDENT
LAUNDRY: WITH SUPERVISION
HYGIENE/GROOMING: INDEPENDENT
HYGIENE/GROOMING: INDEPENDENT
LAUNDRY: WITH SUPERVISION
DRESS: INDEPENDENT

## 2021-07-20 NOTE — PLAN OF CARE
Pt was visible in the milieu for a majority of the shift, he watched movies with peers but tended to keep to himself. He did not attend the group offered this evening. Pt currently denies mental health symptoms. Med compliant with no stated or observed side effects. Received PRN tylenol for back pain. No other concerns at this time.

## 2021-07-20 NOTE — PLAN OF CARE
Patient has spent the early part of the shift in his room. Came out late and ate breakfast. Ate lunch. Denies suicidal ideation and self injurious thoughts. Denies anxiety and depression. States that he is hearing voices. Asked for a new scrub bottom because of incontinence. Needed writer assistance to sit up in bed to take his medications. Affect is flat and appears tired. Med compliant.     Patient evaluation continues. Assessed mood,anxiety,thoughts and behavior.     Patient gradually progressing towards goals.    Patient is encouraged to participate in groups and assisted to develop healthy coping skills.     VS reviewed: /82   Pulse 110   Temp 97.6  F (36.4  C) (Oral)   Resp 16   Ht 1.829 m (6')   Wt 89.3 kg (196 lb 12.8 oz)   SpO2 95%   BMI 26.69 kg/m      Length of stay: 20    Refer to daily team meeting notes for individualized plan of care. Nursing will continue to assess.

## 2021-07-20 NOTE — PLAN OF CARE
Problem: Sleep Disturbance  Goal: Adequate Sleep/Rest  7/20/2021 0328 by Zulma Merino, RN  Outcome: Improving  7/20/2021 0323 by Zulma Merino, RN  Outcome: Improving   Patient slept most part of the shift, no concerns noted, safety checks in place every 15 minutes per unit policy, will continue to monitor as needed.

## 2021-07-20 NOTE — PLAN OF CARE
Problem: Sleep Disturbance  Goal: Adequate Sleep/Rest  Outcome: Improving   Patient slept most part of the shift, no  concerns noted, safety checks in place every 15 minutes per unit protocol, will continue to monitor as needed.

## 2021-07-21 LAB
ALBUMIN SERPL-MCNC: 3.2 G/DL (ref 3.4–5)
ALP SERPL-CCNC: 77 U/L (ref 40–150)
ALT SERPL W P-5'-P-CCNC: 56 U/L (ref 0–70)
ANION GAP SERPL CALCULATED.3IONS-SCNC: 4 MMOL/L (ref 3–14)
AST SERPL W P-5'-P-CCNC: 75 U/L (ref 0–45)
BILIRUB SERPL-MCNC: 0.4 MG/DL (ref 0.2–1.3)
BUN SERPL-MCNC: 10 MG/DL (ref 7–30)
CALCIUM SERPL-MCNC: 8.9 MG/DL (ref 8.5–10.1)
CHLORIDE BLD-SCNC: 112 MMOL/L (ref 94–109)
CO2 SERPL-SCNC: 23 MMOL/L (ref 20–32)
CREAT SERPL-MCNC: 0.76 MG/DL (ref 0.66–1.25)
GFR SERPL CREATININE-BSD FRML MDRD: >90 ML/MIN/1.73M2
GLUCOSE BLD-MCNC: 120 MG/DL (ref 70–99)
POTASSIUM BLD-SCNC: 3.7 MMOL/L (ref 3.4–5.3)
PROT SERPL-MCNC: 8 G/DL (ref 6.8–8.8)
SODIUM SERPL-SCNC: 139 MMOL/L (ref 133–144)

## 2021-07-21 PROCEDURE — 36415 COLL VENOUS BLD VENIPUNCTURE: CPT | Performed by: PHYSICIAN ASSISTANT

## 2021-07-21 PROCEDURE — 99231 SBSQ HOSP IP/OBS SF/LOW 25: CPT | Performed by: PSYCHIATRY & NEUROLOGY

## 2021-07-21 PROCEDURE — 250N000013 HC RX MED GY IP 250 OP 250 PS 637: Performed by: PSYCHIATRY & NEUROLOGY

## 2021-07-21 PROCEDURE — 250N000013 HC RX MED GY IP 250 OP 250 PS 637: Performed by: PHYSICIAN ASSISTANT

## 2021-07-21 PROCEDURE — 80053 COMPREHEN METABOLIC PANEL: CPT | Performed by: PHYSICIAN ASSISTANT

## 2021-07-21 PROCEDURE — 124N000002 HC R&B MH UMMC

## 2021-07-21 RX ORDER — METOPROLOL TARTRATE 25 MG/1
25 TABLET, FILM COATED ORAL 2 TIMES DAILY
Status: DISCONTINUED | OUTPATIENT
Start: 2021-07-21 | End: 2022-01-18 | Stop reason: HOSPADM

## 2021-07-21 RX ADMIN — DOCUSATE SODIUM 100 MG: 100 CAPSULE, LIQUID FILLED ORAL at 20:31

## 2021-07-21 RX ADMIN — ASPIRIN 81 MG CHEWABLE TABLET 81 MG: 81 TABLET CHEWABLE at 08:30

## 2021-07-21 RX ADMIN — ACETAMINOPHEN 650 MG: 325 TABLET, FILM COATED ORAL at 08:37

## 2021-07-21 RX ADMIN — DOCUSATE SODIUM 100 MG: 100 CAPSULE, LIQUID FILLED ORAL at 08:30

## 2021-07-21 RX ADMIN — GUAIFENESIN 600 MG: 600 TABLET, EXTENDED RELEASE ORAL at 20:30

## 2021-07-21 RX ADMIN — MEMANTINE 10 MG: 5 TABLET ORAL at 20:30

## 2021-07-21 RX ADMIN — GUAIFENESIN 600 MG: 600 TABLET, EXTENDED RELEASE ORAL at 08:30

## 2021-07-21 RX ADMIN — CLOZAPINE 300 MG: 100 TABLET ORAL at 20:30

## 2021-07-21 RX ADMIN — METOPROLOL TARTRATE 25 MG: 25 TABLET, FILM COATED ORAL at 20:31

## 2021-07-21 RX ADMIN — POLYETHYLENE GLYCOL 3350 17 G: 17 POWDER, FOR SOLUTION ORAL at 08:30

## 2021-07-21 RX ADMIN — ATORVASTATIN CALCIUM 80 MG: 80 TABLET, FILM COATED ORAL at 20:30

## 2021-07-21 RX ADMIN — LEVOTHYROXINE SODIUM 88 MCG: 88 TABLET ORAL at 08:30

## 2021-07-21 RX ADMIN — MEMANTINE 10 MG: 5 TABLET ORAL at 08:30

## 2021-07-21 RX ADMIN — BENZTROPINE MESYLATE 1 MG: 1 TABLET ORAL at 08:30

## 2021-07-21 RX ADMIN — BENZTROPINE MESYLATE 1 MG: 1 TABLET ORAL at 20:31

## 2021-07-21 RX ADMIN — SALMETEROL XINAFOATE 1 PUFF: 50 POWDER, METERED ORAL; RESPIRATORY (INHALATION) at 08:31

## 2021-07-21 RX ADMIN — Medication 50 MCG: at 08:30

## 2021-07-21 RX ADMIN — SALMETEROL XINAFOATE 1 PUFF: 50 POWDER, METERED ORAL; RESPIRATORY (INHALATION) at 20:38

## 2021-07-21 NOTE — PROGRESS NOTES
PSYCHIATRIC PROGRESS NOTE    Admission Date: 06/30/21  Date of Service: 07/21/21    The patient was seen, his chart reviewed, his case discussed with staff at the Team Meeting.  Observation on the unit revealed the patient to be doing somewhat better spending more time in the milieu and attending some groups. Not much interactions with peers were noted. He has been medication compliant. ASH Campos input was noted and appreciated.  John is a 55-year-old single white male with a lifelong history of mental illness, TBI, neurocognitive disorder and polysubstance abuse. He has a history of 3 MI commitments and one CD commitment. This time he also was committed and Jarvised. He had spent several months at [facility] and was transferred here after being seen by Dr. Wilks for psychiatric consultation.  He was initially seen and followed by Dr. Morfin and has been followed by Dr. Dobbins since 07/08/21.  MEDICATIONS  Clozaril 300 mg at bedtime  Namenda 10 mg BID  Cogentin 1 mg BID  Risperdal M-Tab 1 mg Q6H PRN  Melatonin 3 mg HS PRN    LABS  His blood Glucose was 120.   His Blood Chemistry this morning was remarkable for high Clhoride of 112, low Albumin   of 3.2 and high AST of 75.    VS: T - 97.9, Pulse - 110, BP - 120/87, Resp - 16, SpO2 - 96%    MENTAL STATUS EXAMINATION  Revealed the patient to be normally built and normally developed, still somewhat disheveled 55-year old white male appearing older than his stated age. He was alert and partially oriented. His speech was rambling and difficult to understand. His affect was blunted. He denied SI and HI. He denied hallucinations and no prominent delusions were noted or elicited.    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     Juarez Nickerson MD

## 2021-07-21 NOTE — PROGRESS NOTES
Brief medicine note:     Followed up on patients HR and LFTs.     Patient continues to have intermittent tachycardia however overall improving. HR has been <120. LFTs improving.     Today's vital signs, medications and nursing notes were reviewed.     BP (!) 127/90 (BP Location: Left arm)   Pulse 110   Temp 97.9  F (36.6  C) (Tympanic)   Resp 16   Ht 1.829 m (6')   Wt 89.3 kg (196 lb 12.8 oz)   SpO2 96%   BMI 26.69 kg/m      Hepatitis C   Transaminitis   Hep C RNA reactive at 722,158, log 5.9. Patient reports hx of hep c that was treated about 25 years ago. He is unsure if it was fully treated.  Repeat LFTs on 7/21.    - Given positive Hep C, will need close follow up with hepatology on discharge to discuss treatment    - Provided education on ways to reduce transmission   - PTA atorvastatin resumed 7/18. LFTs stable today.      Tachycardia   History of infectious endocarditis (2015) of mitral and aortic valves with severe regurgitation s/p replacement.   History of HFrEF, resolved (EF 50% in 2016)  Non-obstructive CAD   Continues to have intermittent tachycardia.  this AM. BP stable. Labs from 7/18 unremarkable. EKG 7/15 with sinus tachycardia, RBBB. Suspect medications effect. Patient denies lightheadedness, dizziness or chest pain.   - Monitor on Clozaril, Cogentin and Risperidone    - Psychiatry to consider medication adjustments   - Encourage fluids, please notify medicine if HR persistently > 120 BPM or symptomatic    - Start low dose metoprolol of 25 mg BID with hold parameters   - Notify medicine if BP <95/50 after initiation of metoprolol   - Follow up with PCP in 1-2 weeks for ongoing monitoring     ADDENDUM 7/22:   HR this . BP stable at 114/64 sitting, 120/85 standing. Blood pressure tolerating metoprolol.    - Continue metoprolol 25 mg BID   - Notify medicine if BP < 95/50 or HR consistently >120 or <60   - Follow up with PCP within 1-2 weeks for ongoing monitoring and management      Currently patient is medically stable and medicine will sign off. Thank you for allowing us to be a part of this patients care. Please notify on call [facility] if any intercurrent medical issues arise.     No Red PA-C  Internal Medicine [facility] Hospitalist   Contact information available via [facility] Paging/Directory

## 2021-07-21 NOTE — PLAN OF CARE
Problem: Sleep Disturbance  Goal: Adequate Sleep/Rest  Outcome: Improving     Patient appeared to be asleep for 6.5 hours during safety checks this shift. No complaints or concerns voiced by patient or noted by staff. Will continue to monitor and update if there are changes.

## 2021-07-21 NOTE — PLAN OF CARE
Pt actively participated in a structured Therapeutic Recreation group with a focus on leisure participation, stress reduction, and social engagement via a healthy leisure activity. Pt remained focused and engaged throughout full duration of group.  Pt mood was calm and was appropriate with interactions. Pt chose to roll small dice instead of tossing large, foam dice for the activity. Pt caught onto the game quickly, often able to take turns without much guidance.

## 2021-07-21 NOTE — TELEPHONE ENCOUNTER
RECORDS RECEIVED FROM: Internal   Appt Date: 08.03.2021   NOTES STATUS DETAILS   OFFICE NOTE from referring provider Internal 06.30.2021 No Red PA   OFFICE NOTES from other specialists N/A    DISCHARGE SUMMARY from hospital Internal 06.30.2021   MEDICATION LIST Internal    LIVER BIOSPY (IF APPLICABLE)      PATHOLOGY REPORTS  N/A    IMAGING     ENDOSCOPY (IF AVAILABLE) N/A    COLONOSCOPY (IF AVAILABLE) N/A    ULTRASOUND LIVER Internal 07.05.2021 ULTRASOUND ABDOMEN COMPLETE WITH    CT OF ABDOMEN N/A    MRI OF LIVER N/A    FIBROSCAN, US ELASTOGRAPHY, FIBROSIS SCAN, MR ELASTOGRAPHY N/A    LABS     HEPATIC PANEL (LIVER PANEL) Internal 07.07.2021   BASIC METABOLIC PANEL Internal 06.18.2021   COMPLETE METABOLIC PANEL Internal 07.16.2021   COMPLETE BLOOD COUNT (CBC) Internal 07.16.2021   INTERNATIONAL NORMALIZED RATIO (INR) Internal 07.15.2021   HEPATITIS C ANTIBODY Internal 07.05.2021   HEPATITIS C VIRAL LOAD/PCR N/A    HEPATITIS C GENOTYPE N/A    HEPATITIS B SURFACE ANTIGEN Internal 07.05.2021   HEPATITIS B SURFACE ANTIBODY Internal 07.05.2021   HEPATITIS B DNA QUANT LEVEL N/A    HEPATITIS B CORE ANTIBODY N/A

## 2021-07-22 PROCEDURE — 250N000013 HC RX MED GY IP 250 OP 250 PS 637: Performed by: PHYSICIAN ASSISTANT

## 2021-07-22 PROCEDURE — H2032 ACTIVITY THERAPY, PER 15 MIN: HCPCS

## 2021-07-22 PROCEDURE — 250N000013 HC RX MED GY IP 250 OP 250 PS 637: Performed by: PSYCHIATRY & NEUROLOGY

## 2021-07-22 PROCEDURE — 124N000002 HC R&B MH UMMC

## 2021-07-22 RX ADMIN — GUAIFENESIN 600 MG: 600 TABLET, EXTENDED RELEASE ORAL at 22:39

## 2021-07-22 RX ADMIN — POLYETHYLENE GLYCOL 3350 17 G: 17 POWDER, FOR SOLUTION ORAL at 08:49

## 2021-07-22 RX ADMIN — CLOZAPINE 300 MG: 100 TABLET ORAL at 22:37

## 2021-07-22 RX ADMIN — MEMANTINE 10 MG: 5 TABLET ORAL at 22:38

## 2021-07-22 RX ADMIN — GUAIFENESIN 600 MG: 600 TABLET, EXTENDED RELEASE ORAL at 08:49

## 2021-07-22 RX ADMIN — METOPROLOL TARTRATE 25 MG: 25 TABLET, FILM COATED ORAL at 08:50

## 2021-07-22 RX ADMIN — CHOLECALCIFEROL CAP 1.25 MG (50000 UNIT) 1250 MCG: 1.25 CAP at 08:50

## 2021-07-22 RX ADMIN — ASPIRIN 81 MG CHEWABLE TABLET 81 MG: 81 TABLET CHEWABLE at 08:49

## 2021-07-22 RX ADMIN — Medication 50 MCG: at 08:49

## 2021-07-22 RX ADMIN — DOCUSATE SODIUM 100 MG: 100 CAPSULE, LIQUID FILLED ORAL at 22:39

## 2021-07-22 RX ADMIN — METOPROLOL TARTRATE 25 MG: 25 TABLET, FILM COATED ORAL at 22:38

## 2021-07-22 RX ADMIN — BENZTROPINE MESYLATE 1 MG: 1 TABLET ORAL at 08:49

## 2021-07-22 RX ADMIN — MEMANTINE 10 MG: 5 TABLET ORAL at 08:49

## 2021-07-22 RX ADMIN — BENZTROPINE MESYLATE 1 MG: 1 TABLET ORAL at 22:39

## 2021-07-22 RX ADMIN — ATORVASTATIN CALCIUM 80 MG: 80 TABLET, FILM COATED ORAL at 22:38

## 2021-07-22 RX ADMIN — SALMETEROL XINAFOATE 1 PUFF: 50 POWDER, METERED ORAL; RESPIRATORY (INHALATION) at 08:50

## 2021-07-22 RX ADMIN — DOCUSATE SODIUM 100 MG: 100 CAPSULE, LIQUID FILLED ORAL at 08:49

## 2021-07-22 RX ADMIN — LEVOTHYROXINE SODIUM 88 MCG: 88 TABLET ORAL at 08:49

## 2021-07-22 ASSESSMENT — ACTIVITIES OF DAILY LIVING (ADL)
HYGIENE/GROOMING: INDEPENDENT
ORAL_HYGIENE: INDEPENDENT
DRESS: INDEPENDENT
LAUNDRY: WITH SUPERVISION
HYGIENE/GROOMING: INDEPENDENT
ORAL_HYGIENE: INDEPENDENT
DRESS: INDEPENDENT

## 2021-07-22 ASSESSMENT — MIFFLIN-ST. JEOR: SCORE: 1754.77

## 2021-07-22 NOTE — PLAN OF CARE
Problem: Psychotic Symptoms  Goal: Psychotic Symptoms  Description: Signs and symptoms of listed problems will be absent or manageable.  7/21/2021 2239 by Radha Donnelly, RN  Outcome: No Change    [facility]  Pt was observed talking to himself for most of the shift. Pt appears to be responding to some internal stimuli.   Pt ate his meals and was med compliant.  Denies SI/SIB.

## 2021-07-22 NOTE — PLAN OF CARE
"Music Therapy Group note    Total time in session: 45 minutes    Number of patients in group: 5    Scope of service: psychodynamic     Patient progress: returning to baseline, possibly    Intervention: MT Preferences Assessment    Goal of group: to assess and treat through music     Patient response/reaction to treatment intervention(s): John attended the evening MT group offered today.  He was noticed to be talking to himself.  He also made the comment that \"I had a good conversation just now\" when the Pink Maksim song he requested played, explaining to the group \"you may think I'm crazy, but I had a good conversation just now, it's relaxing.\"  It was unclear who he was having the conversation with, but he was calm and cooperative.   No redirections needed.  Pleasant.    Luba Paniagua, [facility]  Board-Certified Music Therapist           "

## 2021-07-22 NOTE — PLAN OF CARE
Patient has spent majority of the shift in the milieu. Patient denies suicidal ideation and self injurious thoughts. Denies anxiety. Continues to report depression and auditory hallucinations. Ate meals. Med compliant. Affect is flat and keeps to himself.     Patient evaluation continues. Assessed mood,anxiety,thoughts and behavior.     Patient gradually progressing towards goals.    Patient is encouraged to participate in groups and assisted to develop healthy coping skills.     VS reviewed: /64   Pulse 108   Temp 98  F (36.7  C) (Oral)   Resp 16   Ht 1.829 m (6')   Wt 89.2 kg (196 lb 9.6 oz)   SpO2 98%   BMI 26.66 kg/m      Length of stay: 22    Refer to daily team meeting notes for individualized plan of care. Nursing will continue to assess.

## 2021-07-23 LAB
BASOPHILS # BLD AUTO: 0.1 10E3/UL (ref 0–0.2)
BASOPHILS NFR BLD AUTO: 1 %
EOSINOPHIL # BLD AUTO: 0.8 10E3/UL (ref 0–0.7)
EOSINOPHIL NFR BLD AUTO: 10 %
ERYTHROCYTE [DISTWIDTH] IN BLOOD BY AUTOMATED COUNT: 13.8 % (ref 10–15)
HCT VFR BLD AUTO: 46.2 % (ref 40–53)
HGB BLD-MCNC: 15.5 G/DL (ref 13.3–17.7)
HOLD SPECIMEN: NORMAL
HOLD SPECIMEN: NORMAL
IMM GRANULOCYTES # BLD: 0 10E3/UL
IMM GRANULOCYTES NFR BLD: 1 %
LYMPHOCYTES # BLD AUTO: 1.7 10E3/UL (ref 0.8–5.3)
LYMPHOCYTES NFR BLD AUTO: 21 %
MCH RBC QN AUTO: 32.3 PG (ref 26.5–33)
MCHC RBC AUTO-ENTMCNC: 33.5 G/DL (ref 31.5–36.5)
MCV RBC AUTO: 96 FL (ref 78–100)
MONOCYTES # BLD AUTO: 0.4 10E3/UL (ref 0–1.3)
MONOCYTES NFR BLD AUTO: 5 %
NEUTROPHILS # BLD AUTO: 4.9 10E3/UL (ref 1.6–8.3)
NEUTROPHILS NFR BLD AUTO: 62 %
NRBC # BLD AUTO: 0 10E3/UL
NRBC BLD AUTO-RTO: 0 /100
PLATELET # BLD AUTO: 252 10E3/UL (ref 150–450)
RBC # BLD AUTO: 4.8 10E6/UL (ref 4.4–5.9)
WBC # BLD AUTO: 7.8 10E3/UL (ref 4–11)

## 2021-07-23 PROCEDURE — 36415 COLL VENOUS BLD VENIPUNCTURE: CPT | Performed by: PSYCHIATRY & NEUROLOGY

## 2021-07-23 PROCEDURE — 250N000013 HC RX MED GY IP 250 OP 250 PS 637: Performed by: PHYSICIAN ASSISTANT

## 2021-07-23 PROCEDURE — 250N000013 HC RX MED GY IP 250 OP 250 PS 637: Performed by: PSYCHIATRY & NEUROLOGY

## 2021-07-23 PROCEDURE — 99231 SBSQ HOSP IP/OBS SF/LOW 25: CPT | Performed by: PSYCHIATRY & NEUROLOGY

## 2021-07-23 PROCEDURE — 124N000002 HC R&B MH UMMC

## 2021-07-23 PROCEDURE — 85025 COMPLETE CBC W/AUTO DIFF WBC: CPT | Performed by: PSYCHIATRY & NEUROLOGY

## 2021-07-23 RX ADMIN — POLYETHYLENE GLYCOL 3350 17 G: 17 POWDER, FOR SOLUTION ORAL at 08:34

## 2021-07-23 RX ADMIN — METOPROLOL TARTRATE 25 MG: 25 TABLET, FILM COATED ORAL at 08:34

## 2021-07-23 RX ADMIN — Medication 50 MCG: at 08:34

## 2021-07-23 RX ADMIN — DOCUSATE SODIUM 100 MG: 100 CAPSULE, LIQUID FILLED ORAL at 19:14

## 2021-07-23 RX ADMIN — DOCUSATE SODIUM 100 MG: 100 CAPSULE, LIQUID FILLED ORAL at 08:34

## 2021-07-23 RX ADMIN — METOPROLOL TARTRATE 25 MG: 25 TABLET, FILM COATED ORAL at 19:14

## 2021-07-23 RX ADMIN — MEMANTINE 10 MG: 5 TABLET ORAL at 08:34

## 2021-07-23 RX ADMIN — RISPERIDONE 1 MG: 1 TABLET, ORALLY DISINTEGRATING ORAL at 17:00

## 2021-07-23 RX ADMIN — BENZTROPINE MESYLATE 1 MG: 1 TABLET ORAL at 19:14

## 2021-07-23 RX ADMIN — CLOZAPINE 300 MG: 100 TABLET ORAL at 19:13

## 2021-07-23 RX ADMIN — BENZTROPINE MESYLATE 1 MG: 1 TABLET ORAL at 08:34

## 2021-07-23 RX ADMIN — ASPIRIN 81 MG CHEWABLE TABLET 81 MG: 81 TABLET CHEWABLE at 08:34

## 2021-07-23 RX ADMIN — GUAIFENESIN 600 MG: 600 TABLET, EXTENDED RELEASE ORAL at 08:34

## 2021-07-23 RX ADMIN — SALMETEROL XINAFOATE 1 PUFF: 50 POWDER, METERED ORAL; RESPIRATORY (INHALATION) at 19:18

## 2021-07-23 RX ADMIN — MEMANTINE 10 MG: 5 TABLET ORAL at 19:14

## 2021-07-23 RX ADMIN — GUAIFENESIN 600 MG: 600 TABLET, EXTENDED RELEASE ORAL at 19:14

## 2021-07-23 RX ADMIN — LEVOTHYROXINE SODIUM 88 MCG: 88 TABLET ORAL at 08:34

## 2021-07-23 RX ADMIN — ATORVASTATIN CALCIUM 80 MG: 80 TABLET, FILM COATED ORAL at 19:14

## 2021-07-23 RX ADMIN — SALMETEROL XINAFOATE 1 PUFF: 50 POWDER, METERED ORAL; RESPIRATORY (INHALATION) at 08:41

## 2021-07-23 NOTE — PROGRESS NOTES
PSYCHIATRIC PROGRESS NOTE    Admission Date: 06/30/2021  Date of Service: 07/23/2021    The patient was seen, his chart reviewed, his case discussed with staff at the Team Meeting.  No significant change was noted in his thinking and behavior. He continued to spent most of time in his room with minimal, if any socialization with peers or staff. He has been medication compliant. He seems to have tolerated a higher doses of Namenda well.  The patient will require a guardianship in order to place him in the locked nursing facility.    This is a 55-year-old single white male with a lifelong history of mental illness, TBI, neurocognitive disorder and polysubstance abuse. He has a history of 3 MI commitments and one CD commitment. This time he also was committed and Jarvised. He had spent several months at [facility] and was transferred here after being seen by Dr. Wilks for psychiatric consultation. He was initially seen and followed by Dr. Morfin and has been followed by Dr. Dobbins since 07/08/21. He has been under my care since 07/14/21.  MEDICATIONS   Clozaril 300 mg at bedtime   Namenda 10 mg BID   Cogentin 1 mg BID   Risperdal M-Tab 1 mg Q6H PRN   Melatonin 3 mg HS PRN    LABS: His CBC this morning was WNL except for slightly elevated Absolute Eosinophils - 0.8    VS: T - 98, Pulse - 93, BP - 98/74, Resp - 18, SpO2 - 95%    MENTAL STATUS EXAMINATION   Revealed the patient to be normally built and normally developed 55-year-old white male appearing older than his stated age. His speech was slurred and difficult to understand. He presented with poverty of speech and paucity of ideas. His affect was blunted. No overt psychotic features were noted. He denied SI and HI. He had no insight into his problems and his judgement was impaired.    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     Juarez Nickerson MD

## 2021-07-23 NOTE — PLAN OF CARE
Problem: Psychotic Symptoms  Goal: Psychotic Symptoms  Description: Signs and symptoms of listed problems will be absent or manageable.  Outcome: No Change       Pt displays a flat affect.  Mood is calm.  Pt has been isolative to his room coming out for meals.  Pt is med complaint.  Ate meals.  Pt continues to report auditory hallucinations.  Will continue to assess.

## 2021-07-23 NOTE — PLAN OF CARE
"Problem: Cognitive Impairment (Psychotic Signs/Symptoms)  Goal: Optimal Cognitive Function (Psychotic Signs/Symptoms)  Outcome: No Change     Gene has been isolative to his room the entire evening. He presents with flat affect. Pt appeared to have been asleep from 1930 onward, but when RN brought pt his meds at 2230, he denied having been asleep; though he was drooling. After taking his meds, he hurriedly sat up and insisted, \"I need to go the bathroom!\" and went to urinate. RN directed pt to put on his depends.     "

## 2021-07-23 NOTE — PLAN OF CARE
NOC Shift Report     Pt in bed at beginning of shift, breathing quiet and unlabored. Patient woke up a few times however without any issues, he slept 5.7 5hours.      No pt complaints or concerns at this time.      No PRNs given. Will continue to monitor.

## 2021-07-23 NOTE — PLAN OF CARE
BEHAVIORAL TEAM DISCUSSION    Participants: Dr. Nickerson, Diane CALIX, Radha Donnelly RN    Progress: Minimal progress. Currently in the process of finding guardianship.     Anticipated length of stay: Unable to determine at this point    Continued Stay Criteria/Rationale: Pt has a complex case in terms of his mental health and medical needs. He will be requiring a locked nursing facility which he is not able to access until guardianship is is secured for pt. This is proving to be a lengthy process.     Medical/Physical: None acute     Precautions:   Behavioral Orders   Procedures     Assault precautions     Code 2     Elopement precautions     Routine Programming     As clinically indicated     Single Room     Status 15     Every 15 minutes.     Plan: Encourage group participation. Medication management. Continue to work toward guardianship.     Rationale for change in precautions or plan: no changes

## 2021-07-23 NOTE — PLAN OF CARE
Assessment/Intervention/Current Symtoms and Care Coordination  WR attended team and reviewed chart.     Discharge Plan or Goal  Pursuing guardianship in order to qualify pt for [facility] nursing facility.     Barriers to Discharge   Guardianship is being sought. Pt cannot discharge until this is in place and a discharge plan is established.     Referral Status  Pending     Legal Status  Commitment/Yanez/in the process of guardianship

## 2021-07-24 PROCEDURE — G0177 OPPS/PHP; TRAIN & EDUC SERV: HCPCS

## 2021-07-24 PROCEDURE — 250N000013 HC RX MED GY IP 250 OP 250 PS 637: Performed by: PSYCHIATRY & NEUROLOGY

## 2021-07-24 PROCEDURE — 250N000013 HC RX MED GY IP 250 OP 250 PS 637: Performed by: PHYSICIAN ASSISTANT

## 2021-07-24 PROCEDURE — 124N000002 HC R&B MH UMMC

## 2021-07-24 RX ADMIN — CLOZAPINE 300 MG: 100 TABLET ORAL at 19:09

## 2021-07-24 RX ADMIN — METOPROLOL TARTRATE 25 MG: 25 TABLET, FILM COATED ORAL at 19:10

## 2021-07-24 RX ADMIN — RISPERIDONE 1 MG: 1 TABLET, ORALLY DISINTEGRATING ORAL at 19:10

## 2021-07-24 RX ADMIN — POLYETHYLENE GLYCOL 3350 17 G: 17 POWDER, FOR SOLUTION ORAL at 09:08

## 2021-07-24 RX ADMIN — ASPIRIN 81 MG CHEWABLE TABLET 81 MG: 81 TABLET CHEWABLE at 09:08

## 2021-07-24 RX ADMIN — GUAIFENESIN 600 MG: 600 TABLET, EXTENDED RELEASE ORAL at 19:10

## 2021-07-24 RX ADMIN — DOCUSATE SODIUM 100 MG: 100 CAPSULE, LIQUID FILLED ORAL at 09:08

## 2021-07-24 RX ADMIN — MEMANTINE 10 MG: 5 TABLET ORAL at 09:08

## 2021-07-24 RX ADMIN — SALMETEROL XINAFOATE 1 PUFF: 50 POWDER, METERED ORAL; RESPIRATORY (INHALATION) at 09:09

## 2021-07-24 RX ADMIN — MEMANTINE 10 MG: 5 TABLET ORAL at 19:10

## 2021-07-24 RX ADMIN — Medication 50 MCG: at 09:08

## 2021-07-24 RX ADMIN — DOCUSATE SODIUM 100 MG: 100 CAPSULE, LIQUID FILLED ORAL at 19:09

## 2021-07-24 RX ADMIN — METOPROLOL TARTRATE 25 MG: 25 TABLET, FILM COATED ORAL at 09:08

## 2021-07-24 RX ADMIN — ATORVASTATIN CALCIUM 80 MG: 80 TABLET, FILM COATED ORAL at 19:09

## 2021-07-24 RX ADMIN — BENZTROPINE MESYLATE 1 MG: 1 TABLET ORAL at 19:10

## 2021-07-24 RX ADMIN — BENZTROPINE MESYLATE 1 MG: 1 TABLET ORAL at 09:08

## 2021-07-24 RX ADMIN — SALMETEROL XINAFOATE 1 PUFF: 50 POWDER, METERED ORAL; RESPIRATORY (INHALATION) at 19:12

## 2021-07-24 RX ADMIN — LEVOTHYROXINE SODIUM 88 MCG: 88 TABLET ORAL at 09:08

## 2021-07-24 RX ADMIN — GUAIFENESIN 600 MG: 600 TABLET, EXTENDED RELEASE ORAL at 09:08

## 2021-07-24 NOTE — PLAN OF CARE
Night Shift Summary (7/23/21 into 07/24/21)    Pt in bed sleeping at start of shift. Breathing quiet and unlabored. Appears to have slept 7 hours.    Assault and Elopement precautions in addition to Single Room order, with no related events occurring this shift.     Will continue to monitor and assess.       Problem: Behavioral Health Plan of Care  Goal: Absence of New-Onset Illness or Injury  Outcome: Improving   Remains safe on the unit.     Problem: Sleep Disturbance  Goal: Adequate Sleep/Rest  Outcome: Improving   Slept ? 6 hours.

## 2021-07-24 NOTE — PLAN OF CARE
"  Problem: Psychotic Symptoms  Goal: Psychotic Symptoms  Description: Signs and symptoms of listed problems will be absent or manageable.  Outcome: No Change    \"Where am I\". \"When am I going home\".  Pt appears confused and has to be reminded to come out and eat meals. Staff continues to re-orient pt.  Pt is med compliant.  No nutritional concerns, pt is eating his meals and drinking adequately.  Pt was incontinent of urine, bed linen changed and prompted pt to take a shower.  Pt declined the shower and said \"later\".  Will continue to assess.         "

## 2021-07-24 NOTE — PLAN OF CARE
Problem: Psychotic Symptoms  Goal: Psychotic Symptoms  Description: Signs and symptoms of listed problems will be absent or manageable.  Outcome: No Change     John continues to respond to internal stimuli. He remains disoriented. RN administered PRN Risperidone 1mg this evening when staff observed pt actively hallucinating.

## 2021-07-24 NOTE — PLAN OF CARE
"Problem: Psychotic Symptoms  Goal: Psychotic Symptoms  Description: Signs and symptoms of listed problems will be absent or manageable.  Outcome: No Change     John appears to be hallucinating this evening. He was observed having conversations with nobody; when RN asked he said he was speaking to \"some \". RN also overheard him say \"hanh Lund\". He was given Risperidone 1mg PRN and also received HS medications early to help with psychotic symptoms. RN again reminded him to use his Depends, as he had urinated and defecated in his bed today.   "

## 2021-07-24 NOTE — PROGRESS NOTES
Pt came into group for a very short time. He made a continuous landscape line on paper. He was struggling with mask. Writer told him if it was that much of a problem to breathe, writer would make an exception and allow him to stay in group. He seemed irritable and said, no he was leaving.

## 2021-07-25 PROCEDURE — 250N000013 HC RX MED GY IP 250 OP 250 PS 637: Performed by: PSYCHIATRY & NEUROLOGY

## 2021-07-25 PROCEDURE — 250N000013 HC RX MED GY IP 250 OP 250 PS 637: Performed by: PHYSICIAN ASSISTANT

## 2021-07-25 PROCEDURE — 124N000002 HC R&B MH UMMC

## 2021-07-25 RX ADMIN — DOCUSATE SODIUM 100 MG: 100 CAPSULE, LIQUID FILLED ORAL at 20:25

## 2021-07-25 RX ADMIN — Medication 50 MCG: at 08:58

## 2021-07-25 RX ADMIN — METOPROLOL TARTRATE 25 MG: 25 TABLET, FILM COATED ORAL at 20:26

## 2021-07-25 RX ADMIN — SALMETEROL XINAFOATE 1 PUFF: 50 POWDER, METERED ORAL; RESPIRATORY (INHALATION) at 09:00

## 2021-07-25 RX ADMIN — SALMETEROL XINAFOATE 1 PUFF: 50 POWDER, METERED ORAL; RESPIRATORY (INHALATION) at 20:27

## 2021-07-25 RX ADMIN — LEVOTHYROXINE SODIUM 88 MCG: 88 TABLET ORAL at 08:57

## 2021-07-25 RX ADMIN — GUAIFENESIN 600 MG: 600 TABLET, EXTENDED RELEASE ORAL at 08:58

## 2021-07-25 RX ADMIN — CLOZAPINE 300 MG: 100 TABLET ORAL at 20:25

## 2021-07-25 RX ADMIN — GUAIFENESIN 600 MG: 600 TABLET, EXTENDED RELEASE ORAL at 20:25

## 2021-07-25 RX ADMIN — RISPERIDONE 1 MG: 1 TABLET, ORALLY DISINTEGRATING ORAL at 17:22

## 2021-07-25 RX ADMIN — MEMANTINE 10 MG: 5 TABLET ORAL at 20:25

## 2021-07-25 RX ADMIN — MEMANTINE 10 MG: 5 TABLET ORAL at 08:58

## 2021-07-25 RX ADMIN — ATORVASTATIN CALCIUM 80 MG: 80 TABLET, FILM COATED ORAL at 20:25

## 2021-07-25 RX ADMIN — NICOTINE POLACRILEX 2 MG: 2 LOZENGE ORAL at 18:34

## 2021-07-25 RX ADMIN — ASPIRIN 81 MG CHEWABLE TABLET 81 MG: 81 TABLET CHEWABLE at 08:57

## 2021-07-25 RX ADMIN — BENZTROPINE MESYLATE 1 MG: 1 TABLET ORAL at 20:26

## 2021-07-25 RX ADMIN — BENZTROPINE MESYLATE 1 MG: 1 TABLET ORAL at 08:58

## 2021-07-25 RX ADMIN — DOCUSATE SODIUM 100 MG: 100 CAPSULE, LIQUID FILLED ORAL at 08:57

## 2021-07-25 RX ADMIN — POLYETHYLENE GLYCOL 3350 17 G: 17 POWDER, FOR SOLUTION ORAL at 08:57

## 2021-07-25 RX ADMIN — METOPROLOL TARTRATE 25 MG: 25 TABLET, FILM COATED ORAL at 08:57

## 2021-07-25 ASSESSMENT — ACTIVITIES OF DAILY LIVING (ADL)
HYGIENE/GROOMING: PROMPTS
HYGIENE/GROOMING: SHOWER;PROMPTS
DRESS: SCRUBS (BEHAVIORAL HEALTH);INDEPENDENT
DRESS: SCRUBS (BEHAVIORAL HEALTH)
ORAL_HYGIENE: PROMPTS
ORAL_HYGIENE: PROMPTS

## 2021-07-25 NOTE — PLAN OF CARE
Problem: Psychotic Symptoms  Goal: Psychotic Symptoms  Description: Signs and symptoms of listed problems will be absent or manageable.  Outcome: No Change  Flowsheets (Taken 7/25/2021 0454)  Psychotic Symptoms Assessed: all    Pt is only oriented to self.  Affect is flat blunted.  Pt has been in and out of his room this shift.  Pt continues to appear confused.  Pt was med compliant.  Good appetite eating meals and drinking adequately.  Will continue to assess.

## 2021-07-25 NOTE — PROGRESS NOTES
John  attended a Life Skills group this evening that involved sharing reflections according to question prompts. Appeared to be responding to internal stimuli. Preoccupied but agreeable.      07/24/21 2100   Occupational Therapy   Type of Intervention structured groups   Response Initiates, socially acceptable   Hours 1

## 2021-07-25 NOTE — PLAN OF CARE
Problem: Sleep Disturbance  Goal: Adequate Sleep/Rest  Outcome: Improving     Night shift summary:   John appears to have slept 7 hours overnight. He remained asleep in his room for the entire overnight shift.

## 2021-07-25 NOTE — PLAN OF CARE
Problem: Psychotic Symptoms  Goal: Psychotic Symptoms  Description: Signs and symptoms of listed problems will be absent or manageable.  Outcome: No Change     John continues to respond to internal stimuli, though he denies hallucinations when RN asks. He was observed as if having a conversation with nobody multiple times this evening. RN administered PRN Risperidone 1mg at approximately 1730. His memory is significantly impaired; he asked staff members where his bathroom is x2 and continuously asks for cigarettes. He appears disoriented to time, as he asked for coffee at least 3 times after 2200 this evening. With multiple prompts, pt showered this evening; he was given disposable underwear (Depends) to put on after his shower.

## 2021-07-26 PROCEDURE — 250N000013 HC RX MED GY IP 250 OP 250 PS 637: Performed by: PHYSICIAN ASSISTANT

## 2021-07-26 PROCEDURE — H2032 ACTIVITY THERAPY, PER 15 MIN: HCPCS

## 2021-07-26 PROCEDURE — 250N000013 HC RX MED GY IP 250 OP 250 PS 637: Performed by: PSYCHIATRY & NEUROLOGY

## 2021-07-26 PROCEDURE — 124N000002 HC R&B MH UMMC

## 2021-07-26 PROCEDURE — 99231 SBSQ HOSP IP/OBS SF/LOW 25: CPT | Performed by: PSYCHIATRY & NEUROLOGY

## 2021-07-26 RX ADMIN — NICOTINE POLACRILEX 2 MG: 2 LOZENGE ORAL at 14:05

## 2021-07-26 RX ADMIN — SALMETEROL XINAFOATE 1 PUFF: 50 POWDER, METERED ORAL; RESPIRATORY (INHALATION) at 22:28

## 2021-07-26 RX ADMIN — GUAIFENESIN 600 MG: 600 TABLET, EXTENDED RELEASE ORAL at 09:55

## 2021-07-26 RX ADMIN — LEVOTHYROXINE SODIUM 88 MCG: 88 TABLET ORAL at 09:55

## 2021-07-26 RX ADMIN — SALMETEROL XINAFOATE 1 PUFF: 50 POWDER, METERED ORAL; RESPIRATORY (INHALATION) at 22:29

## 2021-07-26 RX ADMIN — CLOZAPINE 300 MG: 100 TABLET ORAL at 22:26

## 2021-07-26 RX ADMIN — METOPROLOL TARTRATE 25 MG: 25 TABLET, FILM COATED ORAL at 22:30

## 2021-07-26 RX ADMIN — DOCUSATE SODIUM 100 MG: 100 CAPSULE, LIQUID FILLED ORAL at 22:27

## 2021-07-26 RX ADMIN — METOPROLOL TARTRATE 25 MG: 25 TABLET, FILM COATED ORAL at 09:53

## 2021-07-26 RX ADMIN — MEMANTINE 10 MG: 5 TABLET ORAL at 22:27

## 2021-07-26 RX ADMIN — ATORVASTATIN CALCIUM 80 MG: 80 TABLET, FILM COATED ORAL at 22:27

## 2021-07-26 RX ADMIN — BENZTROPINE MESYLATE 1 MG: 1 TABLET ORAL at 09:58

## 2021-07-26 RX ADMIN — GUAIFENESIN 600 MG: 600 TABLET, EXTENDED RELEASE ORAL at 22:26

## 2021-07-26 RX ADMIN — MEMANTINE 10 MG: 5 TABLET ORAL at 09:52

## 2021-07-26 RX ADMIN — BENZTROPINE MESYLATE 1 MG: 1 TABLET ORAL at 22:28

## 2021-07-26 ASSESSMENT — ACTIVITIES OF DAILY LIVING (ADL)
HYGIENE/GROOMING: INDEPENDENT
ORAL_HYGIENE: INDEPENDENT
ORAL_HYGIENE: INDEPENDENT
LAUNDRY: WITH SUPERVISION
HYGIENE/GROOMING: INDEPENDENT
DRESS: INDEPENDENT
LAUNDRY: WITH SUPERVISION
DRESS: INDEPENDENT

## 2021-07-26 NOTE — PLAN OF CARE
"Patient alert and oriented to person, place, and time. Pt is irritable in morning throwing med cup at this writer and stating \"I didn't get enough sleep. I want to sleep.\" Pt refused half of meds. Later pt pleasant, good eye contact, cooperative, medication compliant. PRN MIKA lozenge given. Pt on assault and elopement precautions with no behaviors observed. Pt denies SI/HI/SIB, endorses anxiety and depression stating \"I wouldn't be if I had my cigarettes.\"  Pt contracts for safety. Pt did not demonstrate delusional thinking, did not appear to be responding to internal stimuli. Pt isolative in room most of shift, out in milieu afternoon,  appropriate with peers and staff. Pt communicates and verbalizes needs to staff. No other behaviors noted. Will continue to monitor behavior and encourage engagement. Waiting for state appointed guardianship.     Problem: Behavioral Health Plan of Care  Goal: Plan of Care Review  Outcome: Improving     Problem: Behavioral Health Plan of Care  Goal: Adheres to Safety Considerations for Self and Others  Outcome: Improving     Problem: Behavioral Health Plan of Care  Goal: Develops/Participates in Therapeutic [place] to Support Successful Transition  Outcome: Improving     Problem: Psychotic Symptoms  Goal: Psychotic Symptoms  Description: Signs and symptoms of listed problems will be absent or manageable.  Outcome: Improving     Problem: Sleep Disturbance  Goal: Adequate Sleep/Rest  Outcome: Improving     "

## 2021-07-26 NOTE — PLAN OF CARE
Night Shift Summary (7/25/21 into 07/26/21)    Pt in bed sleeping at start of shift. Breathing quiet and unlabored. Appears to have slept 6.25 hours.    Assault and Elopement precautions in addition to Single Room order, with no related events occurring this shift.     Will continue to monitor and assess.       Problem: Behavioral Health Plan of Care  Goal: Absence of New-Onset Illness or Injury  Outcome: Improving     Problem: Sleep Disturbance  Goal: Adequate Sleep/Rest  Outcome: Improving

## 2021-07-26 NOTE — PLAN OF CARE
Assessment/Intervention/Current Symtoms and Care Coordination  -Chart review  -Team meeting  -Researched possible locked facilities.  Current Symptoms include the following: Psychosis, disorganization, and confusion    Discharge Plan or Goal  Pending guardianship order.  Considerations include:  Nursing facility    Barriers to Discharge  Guardianship is being sought. Pt cannot discharge until this is in place and a discharge plan is established.     Referral Status  Pending guardianship    Legal Status  Patient is under MI commitment in [facility]

## 2021-07-26 NOTE — PROGRESS NOTES
"  PSYCHIATRIC PROGRESS NOTE    Admission date: 06/30/2021  Date of Service: 07/26/2021    The patient was seen, his chart reviewed, his case discussed with staff at the Team Meeting.  Reportedly, he has been spending majority of time in his room napping he did verbalize that he has been hearing \"voices\" which he confirmed when I talked to him this morning. He told me that he wanted to go home.  He has been medication compliant. PRN Risperdal was given to the patient at least once daily.    This is a 55-year-old single white male with a lifelong history of mental illness, TBI, neurocognitive disorder and polysubstance abuse. He has a history of 3 MI commitments and one CD commitment. This time he also was committed and Jarvised. He had spent several months at [facility] and was transferred here after being seen by Dr. Wilks for psychiatric consultation. He was initially seen and followed by Dr. Morfin and has been followed by Dr. Dobbins since 07/08/21. He has been under my care since 07/14/21.     MEDICATIONS   Clozaril 300 mg at bedtime   Namenda 10 mg BID   Cogentin 1 mg BID   Risperdal M-Tab 1 mg Q6H PRN   Melatonin 3 mg HS PRN     LABS: No new results    VS: T - 96.6, Pulse - 97, BP - 113/84, Resp - 18, SpO2 - 96%    MENTAL STATUS EXAMINATION   Revealed the patient to be normally built and normally developed 55-year-old white male appearing older than his stated age. His speech was slurred and difficult to understand. He presented with poverty of speech and paucity of ideas. His affect was blunted.   He denied SI and HI. He admitted to auditory hallucinations without imperative content. No prominent delusions were noted or elicited. He presented with some impairment of memory and cognition He had no insight into his problems and his judgement was impaired.     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 CBC will also be ordered.    Juarez Nickerson MD

## 2021-07-27 LAB
BASOPHILS # BLD AUTO: 0.1 10E3/UL (ref 0–0.2)
BASOPHILS NFR BLD AUTO: 1 %
EOSINOPHIL # BLD AUTO: 1 10E3/UL (ref 0–0.7)
EOSINOPHIL NFR BLD AUTO: 17 %
ERYTHROCYTE [DISTWIDTH] IN BLOOD BY AUTOMATED COUNT: 13.6 % (ref 10–15)
HCT VFR BLD AUTO: 47.2 % (ref 40–53)
HGB BLD-MCNC: 16.1 G/DL (ref 13.3–17.7)
IMM GRANULOCYTES # BLD: 0 10E3/UL
IMM GRANULOCYTES NFR BLD: 0 %
LYMPHOCYTES # BLD AUTO: 1.5 10E3/UL (ref 0.8–5.3)
LYMPHOCYTES NFR BLD AUTO: 26 %
MCH RBC QN AUTO: 32.3 PG (ref 26.5–33)
MCHC RBC AUTO-ENTMCNC: 34.1 G/DL (ref 31.5–36.5)
MCV RBC AUTO: 95 FL (ref 78–100)
MONOCYTES # BLD AUTO: 0.6 10E3/UL (ref 0–1.3)
MONOCYTES NFR BLD AUTO: 10 %
NEUTROPHILS # BLD AUTO: 2.6 10E3/UL (ref 1.6–8.3)
NEUTROPHILS NFR BLD AUTO: 46 %
NRBC # BLD AUTO: 0 10E3/UL
NRBC BLD AUTO-RTO: 0 /100
PLATELET # BLD AUTO: 260 10E3/UL (ref 150–450)
RBC # BLD AUTO: 4.99 10E6/UL (ref 4.4–5.9)
WBC # BLD AUTO: 5.7 10E3/UL (ref 4–11)

## 2021-07-27 PROCEDURE — 250N000013 HC RX MED GY IP 250 OP 250 PS 637: Performed by: PSYCHIATRY & NEUROLOGY

## 2021-07-27 PROCEDURE — 80159 DRUG ASSAY CLOZAPINE: CPT | Performed by: PSYCHIATRY & NEUROLOGY

## 2021-07-27 PROCEDURE — 85025 COMPLETE CBC W/AUTO DIFF WBC: CPT | Performed by: PSYCHIATRY & NEUROLOGY

## 2021-07-27 PROCEDURE — 250N000013 HC RX MED GY IP 250 OP 250 PS 637: Performed by: PHYSICIAN ASSISTANT

## 2021-07-27 PROCEDURE — 124N000002 HC R&B MH UMMC

## 2021-07-27 PROCEDURE — 36415 COLL VENOUS BLD VENIPUNCTURE: CPT | Performed by: PSYCHIATRY & NEUROLOGY

## 2021-07-27 PROCEDURE — 99233 SBSQ HOSP IP/OBS HIGH 50: CPT | Performed by: PSYCHIATRY & NEUROLOGY

## 2021-07-27 RX ADMIN — MEMANTINE 10 MG: 5 TABLET ORAL at 20:49

## 2021-07-27 RX ADMIN — Medication 50 MCG: at 09:10

## 2021-07-27 RX ADMIN — BENZTROPINE MESYLATE 1 MG: 1 TABLET ORAL at 20:50

## 2021-07-27 RX ADMIN — DOCUSATE SODIUM 100 MG: 100 CAPSULE, LIQUID FILLED ORAL at 20:50

## 2021-07-27 RX ADMIN — GUAIFENESIN 600 MG: 600 TABLET, EXTENDED RELEASE ORAL at 09:10

## 2021-07-27 RX ADMIN — SALMETEROL XINAFOATE 1 PUFF: 50 POWDER, METERED ORAL; RESPIRATORY (INHALATION) at 21:09

## 2021-07-27 RX ADMIN — METOPROLOL TARTRATE 25 MG: 25 TABLET, FILM COATED ORAL at 09:10

## 2021-07-27 RX ADMIN — DOCUSATE SODIUM 100 MG: 100 CAPSULE, LIQUID FILLED ORAL at 09:10

## 2021-07-27 RX ADMIN — GUAIFENESIN 600 MG: 600 TABLET, EXTENDED RELEASE ORAL at 20:49

## 2021-07-27 RX ADMIN — NICOTINE POLACRILEX 2 MG: 2 LOZENGE ORAL at 16:06

## 2021-07-27 RX ADMIN — BENZTROPINE MESYLATE 1 MG: 1 TABLET ORAL at 09:10

## 2021-07-27 RX ADMIN — SALMETEROL XINAFOATE 1 PUFF: 50 POWDER, METERED ORAL; RESPIRATORY (INHALATION) at 09:11

## 2021-07-27 RX ADMIN — ASPIRIN 81 MG CHEWABLE TABLET 81 MG: 81 TABLET CHEWABLE at 09:10

## 2021-07-27 RX ADMIN — MEMANTINE 10 MG: 5 TABLET ORAL at 09:10

## 2021-07-27 RX ADMIN — NICOTINE 7 MG/24 HR DAILY TRANSDERMAL PATCH 1 PATCH: at 09:10

## 2021-07-27 RX ADMIN — ATORVASTATIN CALCIUM 80 MG: 80 TABLET, FILM COATED ORAL at 20:49

## 2021-07-27 RX ADMIN — LEVOTHYROXINE SODIUM 88 MCG: 88 TABLET ORAL at 09:11

## 2021-07-27 RX ADMIN — RISPERIDONE 1 MG: 1 TABLET, ORALLY DISINTEGRATING ORAL at 16:05

## 2021-07-27 RX ADMIN — POLYETHYLENE GLYCOL 3350 17 G: 17 POWDER, FOR SOLUTION ORAL at 09:11

## 2021-07-27 RX ADMIN — CLOZAPINE 300 MG: 100 TABLET ORAL at 20:49

## 2021-07-27 RX ADMIN — METOPROLOL TARTRATE 25 MG: 25 TABLET, FILM COATED ORAL at 20:49

## 2021-07-27 ASSESSMENT — ACTIVITIES OF DAILY LIVING (ADL)
DRESS: INDEPENDENT
ORAL_HYGIENE: PROMPTS
HYGIENE/GROOMING: SHOWER
LAUNDRY: WITH SUPERVISION

## 2021-07-27 NOTE — PLAN OF CARE
Problem: Cognitive Impairment (Psychotic Signs/Symptoms)  Goal: Optimal Cognitive Function (Psychotic Signs/Symptoms)  Outcome: No Change  Flowsheets (Taken 7/27/2021 4104)  Mutually Determined Action Steps (Optimal Cognitive Function): remains focused during activity     Patient isolated to his room until lunch time.  Breakfast was brought in for the patient.  Patient is alert and aware of the writer when in the room.  Patient affect is flat, speech is incoherent with nonsensical statements.  Patient is med compliant and ate 100% of breakfast.  In the afternoon, the patient is observed in the milieu.  When prompted, the patient took a shower.  The patient had an episode of incontinent.  Patients mattress is cleaned and clean linen is placed on the bed.  Patient remained in the lounge.  Ate lunch.  Patient did not show any unsafe behaviors.  Continue with plan of care.

## 2021-07-27 NOTE — PLAN OF CARE
Assessment/Intervention/Current Symtoms and Care Coordination  -Reviewed chart  -Met with team  -Met with John, he reported he's affiliated with [employer]. He presented as delusional, stating he owns several properties across the country and owns the country of [place]. He was not responsive to reality testing.  [phone]- Left VM with Oli Melendrez at [phone] requesting return call regarding guardianship  Current Symptoms include the following: Psychosis, delusional thinking, tongue twisting relevant to tardive dyskinesia.    Discharge Plan or Goal  Pending stabilization & development of a safe discharge plan.  Considerations include:  Guardianship and secure nursing facility    Barriers to Discharge  Patient requires further psychiatric stabilization due to current symptomology    Referral Status  [facility] Health Case Management and guardianship    Legal Status  Patient is under MI commitment in [facility]

## 2021-07-27 NOTE — PLAN OF CARE
Pt slept 6.5 hours without issue. Pt remains on Assault and Elopement precautions. Pt remains on 15 minute checks. Will continue to monitor.

## 2021-07-27 NOTE — PLAN OF CARE
Patient has been in the milieu for majority of the shift. Patient denies suicidal ideation and self injurious thoughts. Denies anxiety and depression. Continues to have auditory hallucinations. Patient was hard to arouse to take his HS medications. Affect is flat and irritable. Ate dinner. Med compliant.    Patient evaluation continues. Assessed mood,anxiety,thoughts and behavior.     Patient gradually progressing towards goals.    Patient is encouraged to participate in groups and assisted to develop healthy coping skills.     VS reviewed: /70 (BP Location: Left arm)   Pulse 86   Temp 98.6  F (37  C) (Oral)   Resp 18   Ht 1.829 m (6')   Wt 89.2 kg (196 lb 9.6 oz)   SpO2 96%   BMI 26.66 kg/m      Length of stay: 26    Refer to daily team meeting notes for individualized plan of care. Nursing will continue to assess.

## 2021-07-27 NOTE — PLAN OF CARE
Pt actively participated in a structured Therapeutic Recreation group with a focus on leisure participation and exercise. Pt participated in the guided exercise for approximately x30 minutes. Pt entered group late and left group in the middle for several minutes. Pt followed along, engaged in the guided chair exercise routine.  Pt was encouraged to use positive imagery with the deep breathing and stretching to foster relaxation, improves focus, and reduce stress. Pt seemed to be responding to internal stimuli, often talking softly to himself and looking in the direction where no one was at. Pt added to the discussion prompts, but often was rambling on and mummbling making it difficult to understand. Some of the content of what was being said was unrelated to the discussion.

## 2021-07-28 LAB
CLOZAPINE SERPL-MCNC: 453 NG/ML
CLOZAPINE+NOR SERPL-MCNC: 767 NG/ML
CNO SERPL-MCNC: <100 NG/ML
NORCLOZAPINE SERPL-MCNC: 314 NG/ML

## 2021-07-28 PROCEDURE — 250N000013 HC RX MED GY IP 250 OP 250 PS 637: Performed by: PSYCHIATRY & NEUROLOGY

## 2021-07-28 PROCEDURE — 99233 SBSQ HOSP IP/OBS HIGH 50: CPT | Performed by: PSYCHIATRY & NEUROLOGY

## 2021-07-28 PROCEDURE — 124N000002 HC R&B MH UMMC

## 2021-07-28 PROCEDURE — 250N000013 HC RX MED GY IP 250 OP 250 PS 637: Performed by: PHYSICIAN ASSISTANT

## 2021-07-28 RX ADMIN — DOCUSATE SODIUM 100 MG: 100 CAPSULE, LIQUID FILLED ORAL at 21:20

## 2021-07-28 RX ADMIN — GUAIFENESIN 600 MG: 600 TABLET, EXTENDED RELEASE ORAL at 08:28

## 2021-07-28 RX ADMIN — BENZTROPINE MESYLATE 1 MG: 1 TABLET ORAL at 21:20

## 2021-07-28 RX ADMIN — SALMETEROL XINAFOATE 1 PUFF: 50 POWDER, METERED ORAL; RESPIRATORY (INHALATION) at 21:22

## 2021-07-28 RX ADMIN — SALMETEROL XINAFOATE 1 PUFF: 50 POWDER, METERED ORAL; RESPIRATORY (INHALATION) at 08:32

## 2021-07-28 RX ADMIN — DOCUSATE SODIUM 100 MG: 100 CAPSULE, LIQUID FILLED ORAL at 08:28

## 2021-07-28 RX ADMIN — POLYETHYLENE GLYCOL 3350 17 G: 17 POWDER, FOR SOLUTION ORAL at 08:27

## 2021-07-28 RX ADMIN — LEVOTHYROXINE SODIUM 88 MCG: 88 TABLET ORAL at 08:28

## 2021-07-28 RX ADMIN — METOPROLOL TARTRATE 25 MG: 25 TABLET, FILM COATED ORAL at 21:20

## 2021-07-28 RX ADMIN — ATORVASTATIN CALCIUM 80 MG: 80 TABLET, FILM COATED ORAL at 21:20

## 2021-07-28 RX ADMIN — GUAIFENESIN 600 MG: 600 TABLET, EXTENDED RELEASE ORAL at 21:20

## 2021-07-28 RX ADMIN — Medication 50 MCG: at 08:28

## 2021-07-28 RX ADMIN — METOPROLOL TARTRATE 25 MG: 25 TABLET, FILM COATED ORAL at 08:28

## 2021-07-28 RX ADMIN — CLOZAPINE 300 MG: 100 TABLET ORAL at 21:20

## 2021-07-28 RX ADMIN — MEMANTINE 10 MG: 5 TABLET ORAL at 21:20

## 2021-07-28 RX ADMIN — MEMANTINE 10 MG: 5 TABLET ORAL at 08:27

## 2021-07-28 RX ADMIN — ASPIRIN 81 MG CHEWABLE TABLET 81 MG: 81 TABLET CHEWABLE at 08:27

## 2021-07-28 RX ADMIN — BENZTROPINE MESYLATE 1 MG: 1 TABLET ORAL at 08:28

## 2021-07-28 ASSESSMENT — ACTIVITIES OF DAILY LIVING (ADL)
HYGIENE/GROOMING: INDEPENDENT
LAUNDRY: WITH SUPERVISION
HYGIENE/GROOMING: INDEPENDENT
ORAL_HYGIENE: INDEPENDENT
DRESS: INDEPENDENT
ORAL_HYGIENE: INDEPENDENT;PROMPTS
LAUNDRY: WITH SUPERVISION
DRESS: SCRUBS (BEHAVIORAL HEALTH)

## 2021-07-28 NOTE — PLAN OF CARE
Night Shift Summary (7/27/21 into 07/28/21)    Pt in bed sleeping at start of shift. Breathing quiet and unlabored. Came out around 0015 asking if the TV was on. Informed that it was off until morning. Came out again about 5 min later and asked the same thing. Informed that it's still nighttime and the TV will be on in the morning. Both times pt was calm and accepting of answer. Remained sleeping for the rest of the shift. Appears to have slept 6.75 hours.    Assault and Elopement precautions in addition to Single Room order, with no related events occurring this shift.     Will continue to monitor and assess.       Problem: Behavioral Health Plan of Care  Goal: Absence of New-Onset Illness or Injury  Outcome: Improving     Problem: Sleep Disturbance  Goal: Adequate Sleep/Rest  Outcome: Improving

## 2021-07-28 NOTE — PLAN OF CARE
Problem: Psychotic Symptoms  Goal: Psychotic Symptoms  Description: Signs and symptoms of listed problems will be absent or manageable.  Outcome: No Change     Patient was incontinent of bladder.  Patient showered and bed linen is changed.  Patient remained in the lounge for the evening.  Patient is social with peers.  Patient is observed responding to internal stimuli.  His voice is loud and disturbing to peers.  Patient accepts offered PRN.  Patient ate a snack.  Compliant with medication.  Continue with plan of care.

## 2021-07-28 NOTE — PROGRESS NOTES
"[facility], [place]   Psychiatric Progress Note  Hospital Day: 28        Interim History:   The patient's care was discussed with the treatment team during the daily team meeting and/or staff's chart notes were reviewed.  Staff report patient slept 6.75 hours, came out into lounge appearing confused, requesting TV to be turned on, needed to be given response twice, making some nonsensical statements, taking medications as prescribed, no acute events overnight.     Upon interview, patient was sitting out in lounge watching TV with peers, he agreed to meet in his room. He is not able to tell writer what he was watching, reviewed it appeared it was the [employer]. He is oriented to self, he is not oriented to place or situation or time, thinking it was \"1999\". He believes he is at [facility]. Re-oriented patient. He inquired about being able to discharge, reviewed he is under commitment and team working on discharge plan. He continues to be aware of the movements noted in his tongue today, he reports it is due to not having his teeth. He does believe his dentures are at his sisters, will discuss with [facility]. Mood is \"good\", denies SI or HI, denies AVH including \"voices\". Also discussed ongoing incontinence, he denies having any pain with urination, feeling urge to urinate, foul odor. He was encouraged to let staff know if he is having any pain or discomfort. He reports eating and sleeping without difficulty. No additional concerns.          Medications:       aspirin  81 mg Oral Daily     atorvastatin  80 mg Oral At Bedtime     benztropine  1 mg Oral BID     cholecalciferol  1,250 mcg Oral Q7 Days     cloZAPine  300 mg Oral At Bedtime     docusate sodium  100 mg Oral BID     guaiFENesin  600 mg Oral BID     levothyroxine  88 mcg Oral Daily     memantine  10 mg Oral BID     metoprolol tartrate  25 mg Oral BID     nicotine  1 patch Transdermal Daily     nicotine   Transdermal Q8H     polyethylene " "glycol  17 g Oral Daily     salmeterol  1 puff Inhalation BID     vitamin D3  50 mcg Oral Daily          Allergies:     Allergies   Allergen Reactions     Ibuprofen      Latex           Labs:     Recent Results (from the past 48 hour(s))   CBC with platelets and differential    Collection Time: 07/27/21  8:53 AM   Result Value Ref Range    WBC Count 5.7 4.0 - 11.0 10e3/uL    RBC Count 4.99 4.40 - 5.90 10e6/uL    Hemoglobin 16.1 13.3 - 17.7 g/dL    Hematocrit 47.2 40.0 - 53.0 %    MCV 95 78 - 100 fL    MCH 32.3 26.5 - 33.0 pg    MCHC 34.1 31.5 - 36.5 g/dL    RDW 13.6 10.0 - 15.0 %    Platelet Count 260 150 - 450 10e3/uL    % Neutrophils 46 %    % Lymphocytes 26 %    % Monocytes 10 %    % Eosinophils 17 %    % Basophils 1 %    % Immature Granulocytes 0 %    NRBCs per 100 WBC 0 <1 /100    Absolute Neutrophils 2.6 1.6 - 8.3 10e3/uL    Absolute Lymphocytes 1.5 0.8 - 5.3 10e3/uL    Absolute Monocytes 0.6 0.0 - 1.3 10e3/uL    Absolute Eosinophils 1.0 (H) 0.0 - 0.7 10e3/uL    Absolute Basophils 0.1 0.0 - 0.2 10e3/uL    Absolute Immature Granulocytes 0.0 <=0.0 10e3/uL    Absolute NRBCs 0.0 10e3/uL          Psychiatric Examination:     /86   Pulse 84   Temp 98.1  F (36.7  C) (Oral)   Resp 18   Ht 1.829 m (6')   Wt 89.2 kg (196 lb 9.6 oz)   SpO2 98%   BMI 26.66 kg/m    Weight is 196 lbs 9.6 oz  Body mass index is 26.66 kg/m .    Orthostatic Vitals       Most Recent      Sitting Orthostatic /91 07/27 0908    Sitting Orthostatic Pulse (bpm) 90 07/27 0908    Standing Orthostatic /80 07/27 0908    Standing Orthostatic Pulse (bpm) 96 07/27 0908        Appearance: awake, alert and disheveled   Attitude: somewhat cooperative  Eye Contact:  fair   Mood:  \"good\"  Affect:  mood congruent and intensity is flat  Speech:  soft volume, raspy ,dysarthric   Language: fluent and intact in English  Psychomotor, Gait, Musculoskeletal:  no evidence of tardive dyskinesia, dystonia, or tics, noted chewing movements of " buccal-ingual dyskinesia during interview today  Thought Process:  perseverative on discharge otherwise disorganized  Associations:  no loose associations  Thought Content:  no evidence of suicidal ideation or homicidal ideation and delusional  Insight:  limited  Judgement:  limited  Oriented to:  person  Attention Span and Concentration:  limited  Recent and Remote Memory:  limited  Fund of Knowledge:  delayed    Clinical Global Impressions  First:  Considering your total clinical experience with this particular patient population, how severe are the patient's symptoms at this time?: 7 (07/01/21 0630)  Compared to the patient's condition at the START of treatment, this patient's condition is: 4 (07/01/21 0630)  Most recent:  Considering your total clinical experience with this particular patient population, how severe are the patient's symptoms at this time?: 6 (07/27/21 1613)  Compared to the patient's condition at the START of treatment, this patient's condition is: 3 (07/27/21 1613)           Precautions:     Behavioral Orders   Procedures     Assault precautions     Code 2     Elopement precautions     Routine Programming     As clinically indicated     Single Room     Status 15     Every 15 minutes.          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   Stimulant use disorder, in remission.   Transaminitis, possibly medication induced   Hx of CVA  Vit D deficiency  Hypertension  COPD  Hx of infective endocarditis with severe mitral and aortic regurgitation s/p biprostehtic valve replacement 2015  Hx of HFrEF now recovered  Tobacco use disorder  Non-severe malnutrition   Urinary incontinence         Assessment & Plan:   Assessment and hospital summary:  The patient is a 56yo male with a history of schizophrenia and TBI and multiple medical  co-morbidities as above who was admitted after being transferred from [facility] medical [facility] after a nearly year-long stay. Is currently under commitment with [facility] recently amended to include Clozaril. While at [facility] was noted to have ongoing agitation and frequently attempting to elope from unit requiring 1:1 staffing for safety. He was started on 1:1 staff upon transfer, this was discontinued as patient has been more cooperative without elopement attempts. IM consult completed on admission and continue to follow due to elevated LFTs. Haldol and VPA discontinued due to LFTs. Cross titration compelted from risperidone to clozaril after Yanez amended. Increasing EPSE/TD movements noted.     Psychiatric treatment/inteventions:  Medications:   -continue Clozaril 300mg at bedtime for psychosis and agitation. Will not adjust dose further at this time, movements consistent with TD noted, continue to await clozapine level that is in process (target of level >350)  -continue benztropine 1mg BID for EPSE, may consider dose increase give more notable involuntary movements evident past 2 days  -continue memantine 5mg daily for neurocognitive disorder  -continue risperdal 1mg Q6H PRN agitation   -continue lorazepam 0.5-1.0mg Q4H PRN anxiety or severe agitation     Laboratory/Imaging: clozapine level ordered and in process; weekly WBC and ANC for clozapine monitoring continues, last ANC 7/27 was 2.6; next ANC 7/30     Patient will be treated in therapeutic milieu with appropriate individual and group therapies as described.     Medical treatment/interventions:  Medical concerns:   1) IM consult placed on admission, per consult note on 7/1/21:   Diagnoses:    #Major neurocognitive disorder dt hx of TBI and alcohol use disorder  #Schizophrenia  #Under commitment  Had been residing in group home prior to admission.  Brought to hospital for evaluation of aggressive behaviors. Group home now unwilling to take him back. Has had  numerous CODE 21s called this stay. Seen by psych, meds adjusted. Is currently under civil commitment and court hold through [facility] until 7/31/21. Please see recent discharge summary for details on 6/30/20201.   -Management per psychiatry      #Vitamin D Deficiency- Vit D level 16. Has been in hospital since Sept 2020. Started on cholecalciferol 10986 units on 6/17. Continue high-dose weekly replacement for 6 weeks total. Following high dose replacement recommend starting vitamin D 2000 international unit(s) daily replacement (start date 8/5/2021).      #Possible Tardive Dyskinesia vs EPSE- Per psych assessment on 4/1/21, noted to have possible tardive dyskinesia vs extrapyramidal side effects of meds.  - At that time, recommended to increase Cogentin to 1mg BID and add vitamin E 800 international unit(s) daily.      #Hx of CVA - hx of basal ganglia stroke in 2015. No focal neuro deficits aside from cognitive dysfunction as above.      #Hyperlipidemia - Continue PTA ASA 81 mg daily and atorvastatin 80 mg daily.      #COPD - Not O2 dependent. Continue PTA on salmeterol and albuterol PRN. Patient occasionally refusing inhalers, encourage compliance. Mucinex added for intermittent cough at [facility].      #Hypothyroidism- TSH 3.18 on 9/2020. Repeat recently on 5/29/2021 slightly elevated 5.18, with no T4 . Continue PTA levothyroxine 88 mcg daily. Repeat TSH with reflex to T4.      #Hx of infective endocarditis with severe mitral and aortic regurgitation S/P bioprosthetic valve replacement (10/2015)  #Hx of HFrEF, now recovered, not in acute exacerbation  Follows with Dr. Dockery of [facility], last seen in 1/2020. Echocardiogram in 5/2016 with EF 50%, no evidence for prosthetic mitral and aortic valve dysfunction.   - Follow-up with outpatient Cardiology upon dismissal from the hospital (on every 2 year follow-ups).  - Continue ASA 81 mg daily      #Tobacco use disorder- Using nicotine patch  and PRN gum.     #Non-severe malnutrition- Nutrition consulted and following at OSH.      Ordered CMP, CBC, and TSH with reflex.  No further recommendations at this time. Please page the on-call SHREE for any intercurrent medical issues which arise.      ADDENDUM: TSH normal. CBC normal. ALT elevated at 174, with unsatisfactory AST. Per chart review, LFTs last checked in December with  and . T bili and alk phos.  Reviewed with pharmacy, and possible medications causing would be Haldol, depakote, and statin. Discussed with psych who will taper haldol and Depakote. Holding statin, will check LDL.  Will repeat LFTs in AM, and check CK. Medicine will follow up on repeat LFTs, and CK.      Edith Alfaro PA-C  Hospitalist Service     2) Per updated progress note by IM 7/4:   A/P:  #Transaminitis - slowly rising LFTs since December. Asymptomatic. Reviewed with pharmacy, and possible medications causing would be Haldol, depakote, and statin. Discussed with psych who will taper haldol and Depakote. Holding statin, (total cholesterol 91, with LDL 41). Repeat LFTs continue to rise slightly.   -Abdominal US tomorrow (NPO after midnight)  -Hepatitis B surface ab and agn and Hep C ab   -Repeat LFTs in 3 days     Medicine will follow up on Abdominal US, viral hepatitis studies and repeat LFTs.  No further recommendations at this time. Please page the on-call SHREE for any intercurrent medical issues which arise.      Edith Alfaro PA-C  Hospitalist Service     Per progress note 7/12:      Brief Medicine Note     Medicine following peripherally for LFT monitoring.  LFTs today improved:  (182) and ALT 92 (129). T bili and ALP WNL.      Today's vital signs, medications, and nursing notes were reviewed.       /77 (BP Location: Left arm)   Pulse 110   Temp 97.8  F (36.6  C) (Oral)   Resp 16   Ht 1.829 m (6')   Wt 87.5 kg (192 lb 14.4 oz)   SpO2 95%   BMI 26.16 kg/m       Continue current plan of  care. Will continue to trend CMP In 3 days to ensure continued downtrend.      Kim Harman PA-C  Hospitalist Service        3) Urinary incontinence: RN staff first reported urinary incontinence over weekend 7/9, per chart review patient has history of intermittent urinary incontinence going back to at least 2016, continues to have intermittent episodes of incontinence, most recently evening of 7/27, will continue to monitor and discuss with IM if patient develops urinary symptoms      This note was created by undersigned using a Dragon dictation system. All typing errors or contextual distortion are unintentional and software inherent.     Disposition Plan   Reason for ongoing admission: is unable to care for self due to severe psychosis or mariusz and neurocognitive disorder  Discharge location: D, likely locked NH facility   Discharge Medications: not ordered  Follow-up Appointments: not scheduled  Legal Status: Full MI commitment and Yanez  Entered by: Jeanette Dobbins on 7/28/2021 at 7:52 AM

## 2021-07-28 NOTE — PLAN OF CARE
Problem: Behavioral Health Plan of Care  Goal: Develops/Participates in [facility] [place] to Support Successful Transition  Intervention: Foster [facility] [place]  Recent Flowsheet Documentation  Taken 7/28/2021 1141 by Tess Vaca RN  Trust Relationship/Rapport:   care explained   reassurance provided   thoughts/feelings acknowledged   Disorganized thoughts, confusion. Incontinent of urine, changed clothes, staff changed linens.  He declined a shower ' I had one a couple days ago' he also refused to put on pull up to protect himself. Whispers when talking to this staff. Complained of feeling hungry, this staff went down to his room were his breakfast was sitting not touched.  He was medication compliant, usually calm and cooperative.

## 2021-07-28 NOTE — PLAN OF CARE
Assessment/Intervention/Current Symtoms and Care Coordination:  Current Symptoms include the following: Psychosis, AH, disorganization, and confusion  Chart Review  Received a VM from CM stating he had no new information    Discharge Plan or Goal:  Pending stabilization & development of a safe discharge plan.  Considerations include:  Locked nursing facility    Barriers to Discharge:  Patient requires further psychiatric stabilization due to current symptomology and guardinship    Referral Status:  Nursing home    Legal Status:  Patient is under MI commitment in [facility]     Patient wife called back.

## 2021-07-29 PROCEDURE — H2032 ACTIVITY THERAPY, PER 15 MIN: HCPCS

## 2021-07-29 PROCEDURE — 250N000013 HC RX MED GY IP 250 OP 250 PS 637: Performed by: PSYCHIATRY & NEUROLOGY

## 2021-07-29 PROCEDURE — 250N000013 HC RX MED GY IP 250 OP 250 PS 637: Performed by: PHYSICIAN ASSISTANT

## 2021-07-29 PROCEDURE — 99233 SBSQ HOSP IP/OBS HIGH 50: CPT | Performed by: PSYCHIATRY & NEUROLOGY

## 2021-07-29 PROCEDURE — 124N000002 HC R&B MH UMMC

## 2021-07-29 RX ADMIN — CHOLECALCIFEROL CAP 1.25 MG (50000 UNIT) 1250 MCG: 1.25 CAP at 10:22

## 2021-07-29 RX ADMIN — Medication 50 MCG: at 09:21

## 2021-07-29 RX ADMIN — METOPROLOL TARTRATE 25 MG: 25 TABLET, FILM COATED ORAL at 21:21

## 2021-07-29 RX ADMIN — MEMANTINE 10 MG: 5 TABLET ORAL at 21:21

## 2021-07-29 RX ADMIN — METOPROLOL TARTRATE 25 MG: 25 TABLET, FILM COATED ORAL at 09:21

## 2021-07-29 RX ADMIN — BENZTROPINE MESYLATE 1 MG: 1 TABLET ORAL at 09:20

## 2021-07-29 RX ADMIN — SALMETEROL XINAFOATE 1 PUFF: 50 POWDER, METERED ORAL; RESPIRATORY (INHALATION) at 21:23

## 2021-07-29 RX ADMIN — LEVOTHYROXINE SODIUM 88 MCG: 88 TABLET ORAL at 09:21

## 2021-07-29 RX ADMIN — ASPIRIN 81 MG CHEWABLE TABLET 81 MG: 81 TABLET CHEWABLE at 09:21

## 2021-07-29 RX ADMIN — SALMETEROL XINAFOATE 1 PUFF: 50 POWDER, METERED ORAL; RESPIRATORY (INHALATION) at 09:21

## 2021-07-29 RX ADMIN — GUAIFENESIN 600 MG: 600 TABLET, EXTENDED RELEASE ORAL at 09:23

## 2021-07-29 RX ADMIN — DOCUSATE SODIUM 100 MG: 100 CAPSULE, LIQUID FILLED ORAL at 21:21

## 2021-07-29 RX ADMIN — GUAIFENESIN 600 MG: 600 TABLET, EXTENDED RELEASE ORAL at 21:21

## 2021-07-29 RX ADMIN — CLOZAPINE 300 MG: 100 TABLET ORAL at 21:21

## 2021-07-29 RX ADMIN — MEMANTINE 10 MG: 5 TABLET ORAL at 09:22

## 2021-07-29 RX ADMIN — BENZTROPINE MESYLATE 1 MG: 1 TABLET ORAL at 21:21

## 2021-07-29 RX ADMIN — NICOTINE 7 MG/24 HR DAILY TRANSDERMAL PATCH 1 PATCH: at 09:22

## 2021-07-29 RX ADMIN — DOCUSATE SODIUM 100 MG: 100 CAPSULE, LIQUID FILLED ORAL at 09:22

## 2021-07-29 RX ADMIN — ATORVASTATIN CALCIUM 80 MG: 80 TABLET, FILM COATED ORAL at 21:21

## 2021-07-29 RX ADMIN — POLYETHYLENE GLYCOL 3350 17 G: 17 POWDER, FOR SOLUTION ORAL at 09:21

## 2021-07-29 ASSESSMENT — ACTIVITIES OF DAILY LIVING (ADL)
ORAL_HYGIENE: PROMPTS
LAUNDRY: WITH SUPERVISION
ORAL_HYGIENE: INDEPENDENT
HYGIENE/GROOMING: INDEPENDENT
DRESS: SCRUBS (BEHAVIORAL HEALTH)
LAUNDRY: WITH SUPERVISION
HYGIENE/GROOMING: PROMPTS
DRESS: INDEPENDENT

## 2021-07-29 NOTE — PLAN OF CARE
John appeared to sleep a total of 6.5 hours this night shift.  No prns or snacks given or requested.

## 2021-07-29 NOTE — PROGRESS NOTES
"[facility], [place]   Psychiatric Progress Note  Hospital Day: 29        Interim History:   The patient's care was discussed with the treatment team during the daily team meeting and/or staff's chart notes were reviewed.  Staff report patient has been visible in the milieu, irritable but cooperative, appears responding to internal stimuli, talking to self, making delusional/gradiose statements, taking medications as prescribed, no acute events overnight, slept 6.5 hours.     Upon interview, pt was resting in bed, reports mood is \"good\", denies SI or HI, denies AVH or paranoia. He denies any side effects from medications, he states he is \"tired\" but does not feel the medications are making him tired. He continues to deny being aware of any movements in his mouth/tongue. Expressed concern over increasing episodes of urinary incontinence, he denies any burning with urination, increased frequency, flank pain, states \"i'm fine\". Reviewed results of clozapine are still pending and waiting for level and may adjust medication. When asked if there was anything else he needed he stated \"a cigarette\". He denied having any additional concerns.          Medications:       aspirin  81 mg Oral Daily     atorvastatin  80 mg Oral At Bedtime     benztropine  1 mg Oral BID     cholecalciferol  1,250 mcg Oral Q7 Days     cloZAPine  300 mg Oral At Bedtime     docusate sodium  100 mg Oral BID     guaiFENesin  600 mg Oral BID     levothyroxine  88 mcg Oral Daily     memantine  10 mg Oral BID     metoprolol tartrate  25 mg Oral BID     nicotine  1 patch Transdermal Daily     nicotine   Transdermal Q8H     polyethylene glycol  17 g Oral Daily     salmeterol  1 puff Inhalation BID     vitamin D3  50 mcg Oral Daily          Allergies:     Allergies   Allergen Reactions     Ibuprofen      Latex           Labs:     Recent Results (from the past 48 hour(s))   Clozapine and Metabolites Quant    Collection Time: " "07/27/21  8:53 AM   Result Value Ref Range    Clozapine Quant 453 ng/mL    Norclozapine Quant 314 ng/mL    Clozapine-N-Oxide Quant <100 ng/mL    Clozapine and Metabolites Total 767 <=1500 ng/mL   CBC with platelets and differential    Collection Time: 07/27/21  8:53 AM   Result Value Ref Range    WBC Count 5.7 4.0 - 11.0 10e3/uL    RBC Count 4.99 4.40 - 5.90 10e6/uL    Hemoglobin 16.1 13.3 - 17.7 g/dL    Hematocrit 47.2 40.0 - 53.0 %    MCV 95 78 - 100 fL    MCH 32.3 26.5 - 33.0 pg    MCHC 34.1 31.5 - 36.5 g/dL    RDW 13.6 10.0 - 15.0 %    Platelet Count 260 150 - 450 10e3/uL    % Neutrophils 46 %    % Lymphocytes 26 %    % Monocytes 10 %    % Eosinophils 17 %    % Basophils 1 %    % Immature Granulocytes 0 %    NRBCs per 100 WBC 0 <1 /100    Absolute Neutrophils 2.6 1.6 - 8.3 10e3/uL    Absolute Lymphocytes 1.5 0.8 - 5.3 10e3/uL    Absolute Monocytes 0.6 0.0 - 1.3 10e3/uL    Absolute Eosinophils 1.0 (H) 0.0 - 0.7 10e3/uL    Absolute Basophils 0.1 0.0 - 0.2 10e3/uL    Absolute Immature Granulocytes 0.0 <=0.0 10e3/uL    Absolute NRBCs 0.0 10e3/uL          Psychiatric Examination:     /87   Pulse 100   Temp 98.2  F (36.8  C) (Oral)   Resp 18   Ht 1.829 m (6')   Wt 89.2 kg (196 lb 9.6 oz)   SpO2 97%   BMI 26.66 kg/m    Weight is 196 lbs 9.6 oz  Body mass index is 26.66 kg/m .    Orthostatic Vitals     None        Appearance: awake, alert and disheveled   Attitude: somewhat cooperative  Eye Contact:  fair   Mood:  \"good\"  Affect:  mood congruent and intensity is flat  Speech:  soft volume, raspy ,dysarthric   Language: fluent and intact in English  Psychomotor, Gait, Musculoskeletal:  no evidence of tardive dyskinesia, dystonia, or tics, continued movements of buccolingual dyskinesia present during interview today  Thought Process:  perseverative on discharge otherwise disorganized  Associations:  no loose associations  Thought Content:  no evidence of suicidal ideation or homicidal ideation and " delusional  Insight:  limited  Judgement:  limited  Oriented to:  person  Attention Span and Concentration:  limited  Recent and Remote Memory:  limited  Fund of Knowledge:  delayed    Clinical Global Impressions  First:  Considering your total clinical experience with this particular patient population, how severe are the patient's symptoms at this time?: 7 (07/01/21 0630)  Compared to the patient's condition at the START of treatment, this patient's condition is: 4 (07/01/21 0630)  Most recent:  Considering your total clinical experience with this particular patient population, how severe are the patient's symptoms at this time?: 6 (07/27/21 1613)  Compared to the patient's condition at the START of treatment, this patient's condition is: 3 (07/27/21 1613)           Precautions:     Behavioral Orders   Procedures     Assault precautions     Code 2     Elopement precautions     Routine Programming     As clinically indicated     Single Room     Status 15     Every 15 minutes.          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   Stimulant use disorder, in remission.   Transaminitis, possibly medication induced   Hx of CVA  Vit D deficiency  Hypertension  COPD  Hx of infective endocarditis with severe mitral and aortic regurgitation s/p biprostehtic valve replacement 2015  Hx of HFrEF now recovered  Tobacco use disorder  Non-severe malnutrition   Urinary incontinence         Assessment & Plan:   Assessment and hospital summary:  The patient is a 56yo male with a history of schizophrenia and TBI and multiple medical co-morbidities as above who was admitted after being transferred from [facility] after a nearly year-long stay. Is currently under commitment with Yanez recently amended to include Clozaril. While at [facility] was noted to  have ongoing agitation and frequently attempting to elope from unit requiring 1:1 staffing for safety. He was started on 1:1 staff upon transfer, this was discontinued as patient has been more cooperative without elopement attempts. IM consult completed on admission and continue to follow due to elevated LFTs. Haldol and VPA discontinued due to LFTs. Cross titration compelted from risperidone to clozaril after Yanez amended. Increasing EPSE/TD movements noted.     Psychiatric treatment/inteventions:  Medications:   -continue Clozaril 300mg at bedtime for psychosis and agitation. Will not adjust dose further at this time, movements consistent with TD noted, continue to await clozapine level that is in process (target of level >350)  -continue benztropine 1mg BID for EPSE, may consider dose increase give more notable involuntary movements evident past 3 days  -continue memantine 5mg daily for neurocognitive disorder  -continue risperdal 1mg Q6H PRN agitation   -continue lorazepam 0.5-1.0mg Q4H PRN anxiety or severe agitation     Laboratory/Imaging: clozapine level ordered and in process; weekly WBC and ANC for clozapine monitoring continues, last ANC 7/27 was 2.6; next ANC 7/30     Patient will be treated in therapeutic milieu with appropriate individual and group therapies as described.     Medical treatment/interventions:  Medical concerns:   1) IM consult placed on admission, per consult note on 7/1/21:   Diagnoses:    #Major neurocognitive disorder dt hx of TBI and alcohol use disorder  #Schizophrenia  #Under commitment  Had been residing in group home prior to admission.  Brought to hospital for evaluation of aggressive behaviors. Group home now unwilling to take him back. Has had numerous CODE 21s called this stay. Seen by psych, meds adjusted. Is currently under civil commitment and court hold through [facility] until 7/31/21. Please see recent discharge summary for details on 6/30/20201.   -Management  per psychiatry      #Vitamin D Deficiency- Vit D level 16. Has been in hospital since Sept 2020. Started on cholecalciferol 30461 units on 6/17. Continue high-dose weekly replacement for 6 weeks total. Following high dose replacement recommend starting vitamin D 2000 international unit(s) daily replacement (start date 8/5/2021).      #Possible Tardive Dyskinesia vs EPSE- Per psych assessment on 4/1/21, noted to have possible tardive dyskinesia vs extrapyramidal side effects of meds.  - At that time, recommended to increase Cogentin to 1mg BID and add vitamin E 800 international unit(s) daily.      #Hx of CVA - hx of basal ganglia stroke in 2015. No focal neuro deficits aside from cognitive dysfunction as above.      #Hyperlipidemia - Continue PTA ASA 81 mg daily and atorvastatin 80 mg daily.      #COPD - Not O2 dependent. Continue PTA on salmeterol and albuterol PRN. Patient occasionally refusing inhalers, encourage compliance. Mucinex added for intermittent cough at [facility].      #Hypothyroidism- TSH 3.18 on 9/2020. Repeat recently on 5/29/2021 slightly elevated 5.18, with no T4 . Continue PTA levothyroxine 88 mcg daily. Repeat TSH with reflex to T4.      #Hx of infective endocarditis with severe mitral and aortic regurgitation S/P bioprosthetic valve replacement (10/2015)  #Hx of HFrEF, now recovered, not in acute exacerbation  Follows with Dr. Dockery of [facility] and V[facility] Cardiology, last seen in 1/2020. Echocardiogram in 5/2016 with EF 50%, no evidence for prosthetic mitral and aortic valve dysfunction.   - Follow-up with outpatient Cardiology upon dismissal from the hospital (on every 2 year follow-ups).  - Continue ASA 81 mg daily      #Tobacco use disorder- Using nicotine patch and PRN gum.     #Non-severe malnutrition- Nutrition consulted and following at OSH.      Ordered CMP, CBC, and TSH with reflex.  No further recommendations at this time. Please page the on-call SHREE for any intercurrent medical  issues which arise.      ADDENDUM: TSH normal. CBC normal. ALT elevated at 174, with unsatisfactory AST. Per chart review, LFTs last checked in December with  and . T bili and alk phos.  Reviewed with pharmacy, and possible medications causing would be Haldol, depakote, and statin. Discussed with psych who will taper haldol and Depakote. Holding statin, will check LDL.  Will repeat LFTs in AM, and check CK. Medicine will follow up on repeat LFTs, and CK.      Edith Alfaro PA-C  Hospitalist Service     2) Per updated progress note by IM 7/4:   A/P:  #Transaminitis - slowly rising LFTs since December. Asymptomatic. Reviewed with pharmacy, and possible medications causing would be Haldol, depakote, and statin. Discussed with psych who will taper haldol and Depakote. Holding statin, (total cholesterol 91, with LDL 41). Repeat LFTs continue to rise slightly.   -Abdominal US tomorrow (NPO after midnight)  -Hepatitis B surface ab and agn and Hep C ab   -Repeat LFTs in 3 days     Medicine will follow up on Abdominal US, viral hepatitis studies and repeat LFTs.  No further recommendations at this time. Please page the on-call SHREE for any intercurrent medical issues which arise.      Edith Alfaro PA-C  Hospitalist Service     Per progress note 7/12:      Brief Medicine Note     Medicine following peripherally for LFT monitoring.  LFTs today improved:  (182) and ALT 92 (129). T bili and ALP WNL.      Today's vital signs, medications, and nursing notes were reviewed.       /77 (BP Location: Left arm)   Pulse 110   Temp 97.8  F (36.6  C) (Oral)   Resp 16   Ht 1.829 m (6')   Wt 87.5 kg (192 lb 14.4 oz)   SpO2 95%   BMI 26.16 kg/m       Continue current plan of care. Will continue to trend CMP In 3 days to ensure continued downtrend.      Kim Harman PA-C  Hospitalist Service        3) Urinary incontinence and new stool incontinence: RN staff first reported urinary incontinence  over weekend 7/9, per chart review patient has history of intermittent urinary incontinence going back to at least 2016, continues to have intermittent episodes of incontinence, most recently evening of 7/29 with now stool incontinence, have placed IM consult for further assessment of need for additional work up given pts limitations as historian, appreciate assistance     This note was created by undersigned using a Dragon dictation system. All typing errors or contextual distortion are unintentional and software inherent.     Disposition Plan   Reason for ongoing admission: is unable to care for self due to severe psychosis or mariusz and neurocognitive disorder  Discharge location: [facility], likely locked NH facility   Discharge Medications: not ordered  Follow-up Appointments: not scheduled  Legal Status: Full MI commitment and Yanez  Entered by: Jeanette Dobbins on 7/29/2021 at 7:41 AM

## 2021-07-29 NOTE — PLAN OF CARE
Patient has spent majority of the shift in the milieu. Patient denies suicidal ideation and self injurious thoughts. Denies anxiety and depression. Denies auditory and visual hallucinations. Patient while checking his vitals began to look to the right of him and use profanities and hold a conversation. Patient was redirected back to writers questions. Ate dinner. Med compliant.    Patient evaluation continues. Assessed mood,anxiety,thoughts and behavior.     Patient gradually progressing towards goals.    Patient is encouraged to participate in groups and assisted to develop healthy coping skills.     VS reviewed: /87   Pulse 100   Temp 98.2  F (36.8  C) (Oral)   Resp 18   Ht 1.829 m (6')   Wt 89.2 kg (196 lb 9.6 oz)   SpO2 97%   BMI 26.66 kg/m      Length of stay: 28    Refer to daily team meeting notes for individualized plan of care. Nursing will continue to assess.

## 2021-07-29 NOTE — PROGRESS NOTES
07/28/21 2300   Groups   Details   (Pscyhotherapy)   Number of patients attending the group:  1  Group Length:  1 Hours    Group Therapy Type: Psychothrapy    Summary of Group / Topics Discussed:      The  Psychotherapy group goal is to promote insight to positive choice and change. Group processing is within a supportive and safe environment. Patients will process emotions using verbal group and expressive psychotherapy interventions including visual art/writing interventions.    Group interventions support patients by:process 1:1 session    Modalities to reach these goals include: Narrative psychology , supportive psychology, active listening    Subjective -patient report of mood today- no mood report      Objective/ Intervention- Goal of group and Therapeutic modality utilized- 1:1, process     Group Response- pt was the only one in attendance    Patient Response- Pt wanted to just talk. He was pretty good about keeping his mask on which he has refused past groups. He would talk some breaks to drink water and breathe. He is difficult to understand but spoke about staying with a sister he is close to. He said they were in the same foster home when he was little. He had awareness about his drinking and said she doesn't want him there because of his drinking. He seems to feel good that he is sober. He talked about having a felony from when he was young and it is hard to find work. Writer told him about a resource .     He spoke a lot of delusional thoughts about sharing a birthday with , although he said his brithday is in December. He said  gave one dai , (him),  Mansions all over the world because they share the birthday.  He said they never had met but he liked his music. Writer played a song for him and he was singing along. He mentioned many [place] cities and [place] where he has mansions. There was a comment to about Michelle Lund giving him something as well, not clear if it was another mansion.  He seemed like he just needed someone to listen to his thoughts. He said he enjoys tinkering with cars and taking walks in nature.    Azael Valdovinos, DOMONIQUEFT, ATR-BC

## 2021-07-29 NOTE — PLAN OF CARE
Assessment/Intervention/Current Symtoms and Care Coordination  WR completed Chart review and attended team. No updates today re: guardianship. Pt is still presenting with psychosis, occassionally unaware where he is or which decade it is. Presents with some TD Sx (buckling of the mouth).     Discharge Plan or Goal  Secure guardianship and refer pt to a locked nursing facility.     Barriers to Discharge   Current legal status; commitment and pursuable  guardianship. Pt is not stable enough to be able to discharge to a lower level of care at this time.     Referral Status  None at this time     Legal Status  Commitment/Yanez; Guardianship being pursued

## 2021-07-30 LAB
ALBUMIN UR-MCNC: NEGATIVE MG/DL
APPEARANCE UR: CLEAR
BASOPHILS # BLD AUTO: 0.1 10E3/UL (ref 0–0.2)
BASOPHILS NFR BLD AUTO: 2 %
BILIRUB UR QL STRIP: NEGATIVE
COLOR UR AUTO: NORMAL
EOSINOPHIL # BLD AUTO: 0.9 10E3/UL (ref 0–0.7)
EOSINOPHIL NFR BLD AUTO: 17 %
ERYTHROCYTE [DISTWIDTH] IN BLOOD BY AUTOMATED COUNT: 13.4 % (ref 10–15)
GLUCOSE UR STRIP-MCNC: NEGATIVE MG/DL
HCT VFR BLD AUTO: 44.6 % (ref 40–53)
HGB BLD-MCNC: 15.5 G/DL (ref 13.3–17.7)
HGB UR QL STRIP: NEGATIVE
IMM GRANULOCYTES # BLD: 0 10E3/UL
IMM GRANULOCYTES NFR BLD: 1 %
KETONES UR STRIP-MCNC: NEGATIVE MG/DL
LEUKOCYTE ESTERASE UR QL STRIP: NEGATIVE
LYMPHOCYTES # BLD AUTO: 1.6 10E3/UL (ref 0.8–5.3)
LYMPHOCYTES NFR BLD AUTO: 29 %
MCH RBC QN AUTO: 32.1 PG (ref 26.5–33)
MCHC RBC AUTO-ENTMCNC: 34.8 G/DL (ref 31.5–36.5)
MCV RBC AUTO: 92 FL (ref 78–100)
MONOCYTES # BLD AUTO: 0.5 10E3/UL (ref 0–1.3)
MONOCYTES NFR BLD AUTO: 9 %
NEUTROPHILS # BLD AUTO: 2.5 10E3/UL (ref 1.6–8.3)
NEUTROPHILS NFR BLD AUTO: 42 %
NITRATE UR QL: NEGATIVE
NRBC # BLD AUTO: 0 10E3/UL
NRBC BLD AUTO-RTO: 0 /100
PH UR STRIP: 7 [PH] (ref 5–7)
PLATELET # BLD AUTO: 255 10E3/UL (ref 150–450)
RBC # BLD AUTO: 4.83 10E6/UL (ref 4.4–5.9)
SP GR UR STRIP: 1 (ref 1–1.03)
UROBILINOGEN UR STRIP-MCNC: NORMAL MG/DL
WBC # BLD AUTO: 5.6 10E3/UL (ref 4–11)

## 2021-07-30 PROCEDURE — 99233 SBSQ HOSP IP/OBS HIGH 50: CPT | Performed by: PSYCHIATRY & NEUROLOGY

## 2021-07-30 PROCEDURE — 81003 URINALYSIS AUTO W/O SCOPE: CPT | Performed by: PHYSICIAN ASSISTANT

## 2021-07-30 PROCEDURE — 99231 SBSQ HOSP IP/OBS SF/LOW 25: CPT | Performed by: PHYSICIAN ASSISTANT

## 2021-07-30 PROCEDURE — 250N000013 HC RX MED GY IP 250 OP 250 PS 637: Performed by: PSYCHIATRY & NEUROLOGY

## 2021-07-30 PROCEDURE — 90853 GROUP PSYCHOTHERAPY: CPT

## 2021-07-30 PROCEDURE — 250N000013 HC RX MED GY IP 250 OP 250 PS 637: Performed by: PHYSICIAN ASSISTANT

## 2021-07-30 PROCEDURE — 85025 COMPLETE CBC W/AUTO DIFF WBC: CPT | Performed by: PSYCHIATRY & NEUROLOGY

## 2021-07-30 PROCEDURE — 124N000002 HC R&B MH UMMC

## 2021-07-30 PROCEDURE — 36415 COLL VENOUS BLD VENIPUNCTURE: CPT | Performed by: PSYCHIATRY & NEUROLOGY

## 2021-07-30 RX ADMIN — BENZTROPINE MESYLATE 1 MG: 1 TABLET ORAL at 19:52

## 2021-07-30 RX ADMIN — MEMANTINE 10 MG: 5 TABLET ORAL at 09:01

## 2021-07-30 RX ADMIN — CLOZAPINE 300 MG: 100 TABLET ORAL at 20:20

## 2021-07-30 RX ADMIN — MEMANTINE 10 MG: 5 TABLET ORAL at 19:52

## 2021-07-30 RX ADMIN — DOCUSATE SODIUM 100 MG: 100 CAPSULE, LIQUID FILLED ORAL at 19:52

## 2021-07-30 RX ADMIN — GUAIFENESIN 600 MG: 600 TABLET, EXTENDED RELEASE ORAL at 09:00

## 2021-07-30 RX ADMIN — Medication 50 MCG: at 09:00

## 2021-07-30 RX ADMIN — POLYETHYLENE GLYCOL 3350 17 G: 17 POWDER, FOR SOLUTION ORAL at 09:00

## 2021-07-30 RX ADMIN — ATORVASTATIN CALCIUM 80 MG: 80 TABLET, FILM COATED ORAL at 19:52

## 2021-07-30 RX ADMIN — SALMETEROL XINAFOATE 1 PUFF: 50 POWDER, METERED ORAL; RESPIRATORY (INHALATION) at 20:20

## 2021-07-30 RX ADMIN — ASPIRIN 81 MG CHEWABLE TABLET 81 MG: 81 TABLET CHEWABLE at 09:00

## 2021-07-30 RX ADMIN — DOCUSATE SODIUM 100 MG: 100 CAPSULE, LIQUID FILLED ORAL at 09:00

## 2021-07-30 RX ADMIN — BENZTROPINE MESYLATE 1 MG: 1 TABLET ORAL at 09:01

## 2021-07-30 RX ADMIN — METOPROLOL TARTRATE 25 MG: 25 TABLET, FILM COATED ORAL at 19:52

## 2021-07-30 RX ADMIN — GUAIFENESIN 600 MG: 600 TABLET, EXTENDED RELEASE ORAL at 19:52

## 2021-07-30 RX ADMIN — LEVOTHYROXINE SODIUM 88 MCG: 88 TABLET ORAL at 09:01

## 2021-07-30 NOTE — PLAN OF CARE
BEHAVIORAL TEAM DISCUSSION    Participants: Jeanette Dobbins MD, Bárbara Zimmerman RN, Lidia CALIX, Coral Rosales OT  Progress: No change.  Anticipated length of stay: TBD  Continued Stay Criteria/Rationale: Continued confusion, delusional thoughts, unable to care for needs.   Medical/Physical: Increase in episodes of incontinence.   Precautions:   Behavioral Orders   Procedures    Assault precautions    Code 2    Elopement precautions    Routine Programming     As clinically indicated    Single Room    Status 15     Every 15 minutes.     Plan: Medical consult was ordered. Continue to wait for guardianship.  Rationale for change in precautions or plan: No changes in behavioral orders.

## 2021-07-30 NOTE — PLAN OF CARE
Patient has spent majority of the shift in the milieu. Denies suicidal ideation and self injurious thoughts. Denies anxiety and depression. Denies auditory and visual hallucinations. Pleasant and cooperative on the unit. Med compliant.    Patient evaluation continues. Assessed mood,anxiety,thoughts and behavior.     Patient gradually progressing towards goals.    Patient is encouraged to participate in groups and assisted to develop healthy coping skills.     VS reviewed: BP (!) 118/92 (BP Location: Left arm)   Pulse 75   Temp (!) 96.7  F (35.9  C) (Tympanic)   Resp 16   Ht 1.829 m (6')   Wt 89.2 kg (196 lb 9.6 oz)   SpO2 96%   BMI 26.66 kg/m      Length of stay: 29    Refer to daily team meeting notes for individualized plan of care. Nursing will continue to assess.

## 2021-07-30 NOTE — PLAN OF CARE
Patient has spent majority of the shift in the milieu. Denies suicidal ideation and self injurious thoughts. Denies anxiety and depression. Denies auditory and visual hallucinations. Pleasant and cooperative on the unit. Became irritated with writer when some water from his med cup fell on his leg.  Appears to be responding to internal stimuli. Med compliant. Ate dinner.     Patient evaluation continues. Assessed mood,anxiety,thoughts and behavior.     Patient gradually progressing towards goals.    Patient is encouraged to participate in groups and assisted to develop healthy coping skills.     VS reviewed: BP (!) 118/92 (BP Location: Left arm)   Pulse 75   Temp (!) 96.7  F (35.9  C) (Tympanic)   Resp 16   Ht 1.829 m (6')   Wt 89.2 kg (196 lb 9.6 oz)   SpO2 96%   BMI 26.66 kg/m      Length of stay: 29    Refer to daily team meeting notes for individualized plan of care. Nursing will continue to assess.

## 2021-07-30 NOTE — PLAN OF CARE
Night Shift Summary (7/29/21 into 07/30/21)    Pt in bed sleeping at start of shift. Breathing quiet and unlabored. Appears to have slept 6.5 hours.    Assault and Elopement precautions in addition to Single Room order. Any related events noted above.     Will continue to monitor and assess.       Problem: Behavioral Health Plan of Care  Goal: Absence of New-Onset Illness or Injury  Outcome: Improving     Problem: Sleep Disturbance  Goal: Adequate Sleep/Rest  Outcome: Improving

## 2021-07-30 NOTE — CONSULTS
Brief medicine note:  - Please refer to initial medicine consult in charting by Edith Alfaro dated 7/1 for full details. Re-consulted yesterday urine and fecal incontinence. Met with pt in his room, and he confirms that sometimes he just urinates while in bed because he's too tired to get up to the bathroom. Same reason for stool noted on bed. Denies urinary retention, urgency, frequency, dysuria and hematuria. States having normal BMs. Denies abd pain. UA obtained today negative.     GEN: In NAD  Blood pressure (!) 118/92, pulse 75, temperature 97.3  F (36.3  C), temperature source Oral, resp. rate 16, height 1.829 m (6'), weight 89.2 kg (196 lb 9.6 oz), SpO2 96 %.  ABD: SNTND  RECTAL: Deferred    Color Urine (no units)   Date Value   07/30/2021 Straw   09/09/2020 Light Yellow     Appearance Urine (no units)   Date Value   07/30/2021 Clear   09/09/2020 Clear     Glucose Urine (mg/dL)   Date Value   07/30/2021 Negative   09/09/2020 Negative     Bilirubin Urine (no units)   Date Value   07/30/2021 Negative   09/09/2020 Negative     Ketones Urine (mg/dL)   Date Value   07/30/2021 Negative   09/09/2020 Negative     Specific Gravity Urine (no units)   Date Value   07/30/2021 1.005   09/09/2020 1.006     pH Urine   Date Value   07/30/2021 7.0   09/09/2020 5.5 pH     Protein Albumin Urine (mg/dL)   Date Value   07/30/2021 Negative   09/09/2020 Negative     Nitrite Urine (no units)   Date Value   07/30/2021 Negative   09/09/2020 Negative     Leukocyte Esterase Urine (no units)   Date Value   07/30/2021 Negative   09/09/2020 Negative       ASSESSMENT:  Intermittent urinary, and less frequently fecal incontinence, most likely behavioral as pt admits he voids and occasionally will have BM in briefs while in bed as he is too tired to get out of bed to bathroom. UA unremarkable and pt denies any abnormal urinary or bowel concerns.     PLAN:  Will check bladder scan but anticipate PVR to be little to none. If so, medicine  signing off. Please reconsult if pt develops any abnormal urinary complaints. Please feel free to call with questions.     Mk Antonio PA-C  Internal Medicine Hospitalist   [facility] Hospitalist group  [phone]

## 2021-07-30 NOTE — PROGRESS NOTES
"[facility], [place]   Psychiatric Progress Note  Hospital Day: 30        Interim History:   The patient's care was discussed with the treatment team during the daily team meeting and/or staff's chart notes were reviewed.  Staff report patient was visible in the milieu, attended music group, was irritable in evening and appeared responding to internal stimuli, taking medications as prescribed, no acute events overnight.     Upon interview, patient was resting in bed. Reports mood is \"fine\", asks about discharge, again provided patient update that team is working on finding placement, he again stated he has \"mansions all over the [place]\" including one that he got from Michelle Lund. Informed patient that this thought does not appear to be based in reality. He continues to state he is at [facility], year is 1998, does not know month or day. Does not recall who writer is from previous interactions. He dnies having any noted side effects to medications, continues to deny noticing any abnormal involuntary movements including movements of mouth/tongue. He denies SI or HI, denies hearing voices today, denies VH. Denies having any physical pain. Reviewed plan to have internal medicine evaluate him given ongoing issues with incontinence, he denies that he has issues with bowel or bladder incontinence stating \"I'm fine.\". No additional concerns.          Medications:       aspirin  81 mg Oral Daily     atorvastatin  80 mg Oral At Bedtime     benztropine  1 mg Oral BID     cholecalciferol  1,250 mcg Oral Q7 Days     cloZAPine  300 mg Oral At Bedtime     docusate sodium  100 mg Oral BID     guaiFENesin  600 mg Oral BID     levothyroxine  88 mcg Oral Daily     memantine  10 mg Oral BID     metoprolol tartrate  25 mg Oral BID     nicotine  1 patch Transdermal Daily     nicotine   Transdermal Q8H     polyethylene glycol  17 g Oral Daily     salmeterol  1 puff Inhalation BID     vitamin D3  50 mcg Oral " "Daily          Allergies:     Allergies   Allergen Reactions     Ibuprofen      Latex           Labs:     No results found for this or any previous visit (from the past 48 hour(s)).       Psychiatric Examination:     BP (!) 118/92 (BP Location: Left arm)   Pulse 75   Temp (!) 96.7  F (35.9  C) (Tympanic)   Resp 16   Ht 1.829 m (6')   Wt 89.2 kg (196 lb 9.6 oz)   SpO2 96%   BMI 26.66 kg/m    Weight is 196 lbs 9.6 oz  Body mass index is 26.66 kg/m .    Orthostatic Vitals       Most Recent      Sitting Orthostatic BP 98/73 07/30 0833    Sitting Orthostatic Pulse (bpm) 89 07/30 0833    Standing Orthostatic BP 92/66 07/30 0833    Standing Orthostatic Pulse (bpm) 96 07/30 0833        Appearance: awake, alert and disheveled   Attitude: somewhat cooperative  Eye Contact:  fair   Mood:  \"fine\"  Affect:  mood congruent and intensity is flat  Speech:  soft volume, raspy ,dysarthric   Language: fluent and intact in English  Psychomotor, Gait, Musculoskeletal:  no evidence of tardive dyskinesia, dystonia, or tics, continued movements of buccolingual dyskinesia present during interview today  Thought Process:  perseverative on discharge otherwise disorganized  Associations:  no loose associations  Thought Content:  no evidence of suicidal ideation or homicidal ideation and delusional/grandiose  Insight:  limited  Judgement:  limited  Oriented to:  person  Attention Span and Concentration:  limited  Recent and Remote Memory:  limited  Fund of Knowledge:  delayed    Clinical Global Impressions  First:  Considering your total clinical experience with this particular patient population, how severe are the patient's symptoms at this time?: 7 (07/01/21 0630)  Compared to the patient's condition at the START of treatment, this patient's condition is: 4 (07/01/21 0630)  Most recent:  Considering your total clinical experience with this particular patient population, how severe are the patient's symptoms at this time?: 6 (07/27/21 " 1613)  Compared to the patient's condition at the START of treatment, this patient's condition is: 3 (07/27/21 1613)           Precautions:     Behavioral Orders   Procedures     Assault precautions     Code 2     Elopement precautions     Routine Programming     As clinically indicated     Single Room     Status 15     Every 15 minutes.          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   Stimulant use disorder, in remission.   Transaminitis, possibly medication induced   Hx of CVA  Vit D deficiency  Hypertension  COPD  Hx of infective endocarditis with severe mitral and aortic regurgitation s/p biprostehtic valve replacement 2015  Hx of HFrEF now recovered  Tobacco use disorder  Non-severe malnutrition   Urinary incontinence         Assessment & Plan:   Assessment and hospital summary:  The patient is a 54yo male with a history of schizophrenia and TBI and multiple medical co-morbidities as above who was admitted after being transferred from [facility] after a nearly year-long stay. Is currently under commitment with Yanez recently amended to include Clozaril. While at [facility] was noted to have ongoing agitation and frequently attempting to elope from unit requiring 1:1 staffing for safety. He was started on 1:1 staff upon transfer, this was discontinued as patient has been more cooperative without elopement attempts. IM consult completed on admission and continue to follow due to elevated LFTs. Haldol and VPA discontinued due to LFTs. Cross titration compelted from risperidone to clozaril after Yanez amended. Increasing EPSE/TD movements noted.     Psychiatric treatment/inteventions:  Medications:   -continue Clozaril 300mg at bedtime for psychosis and agitation. Will not adjust dose further at this time, movements consistent  with TD noted, and level in therapeutic range as below (target of level >350)  -continue benztropine 1mg BID for EPSE  -continue memantine 5mg daily for neurocognitive disorder  -continue risperdal 1mg Q6H PRN agitation   -continue lorazepam 0.5-1.0mg Q4H PRN anxiety or severe agitation     Laboratory/Imaging: clozapine level resulted: , NorCLZ 314; CLZ-N-Oxide <100; CLZ + metabolite 767; weekly WBC and ANC for clozapine monitoring continues, last ANC 7/30 was 2.5; next ANC 8/7     Patient will be treated in therapeutic milieu with appropriate individual and group therapies as described.     Medical treatment/interventions:  Medical concerns:   1) IM consult placed on admission, per consult note on 7/1/21:   Diagnoses:    #Major neurocognitive disorder dt hx of TBI and alcohol use disorder  #Schizophrenia  #Under commitment  Had been residing in group home prior to admission.  Brought to hospital for evaluation of aggressive behaviors. Group home now unwilling to take him back. Has had numerous CODE 21s called this stay. Seen by psych, meds adjusted. Is currently under civil commitment and court hold through [facility] until 7/31/21. Please see recent discharge summary for details on 6/30/20201.   -Management per psychiatry      #Vitamin D Deficiency- Vit D level 16. Has been in hospital since Sept 2020. Started on cholecalciferol 91246 units on 6/17. Continue high-dose weekly replacement for 6 weeks total. Following high dose replacement recommend starting vitamin D 2000 international unit(s) daily replacement (start date 8/5/2021).      #Possible Tardive Dyskinesia vs EPSE- Per psych assessment on 4/1/21, noted to have possible tardive dyskinesia vs extrapyramidal side effects of meds.  - At that time, recommended to increase Cogentin to 1mg BID and add vitamin E 800 international unit(s) daily.      #Hx of CVA - hx of basal ganglia stroke in 2015. No focal neuro deficits aside from cognitive  dysfunction as above.      #Hyperlipidemia - Continue PTA ASA 81 mg daily and atorvastatin 80 mg daily.      #COPD - Not O2 dependent. Continue PTA on salmeterol and albuterol PRN. Patient occasionally refusing inhalers, encourage compliance. Mucinex added for intermittent cough at [facility].      #Hypothyroidism- TSH 3.18 on 9/2020. Repeat recently on 5/29/2021 slightly elevated 5.18, with no T4 . Continue PTA levothyroxine 88 mcg daily. Repeat TSH with reflex to T4.      #Hx of infective endocarditis with severe mitral and aortic regurgitation S/P bioprosthetic valve replacement (10/2015)  #Hx of HFrEF, now recovered, not in acute exacerbation  Follows with Dr. Dockery of [facility], last seen in 1/2020. Echocardiogram in 5/2016 with EF 50%, no evidence for prosthetic mitral and aortic valve dysfunction.   - Follow-up with outpatient Cardiology upon dismissal from the hospital (on every 2 year follow-ups).  - Continue ASA 81 mg daily      #Tobacco use disorder- Using nicotine patch and PRN gum.     #Non-severe malnutrition- Nutrition consulted and following at OSH.      Ordered CMP, CBC, and TSH with reflex.  No further recommendations at this time. Please page the on-call SHREE for any intercurrent medical issues which arise.      ADDENDUM: TSH normal. CBC normal. ALT elevated at 174, with unsatisfactory AST. Per chart review, LFTs last checked in December with  and . T bili and alk phos.  Reviewed with pharmacy, and possible medications causing would be Haldol, depakote, and statin. Discussed with psych who will taper haldol and Depakote. Holding statin, will check LDL.  Will repeat LFTs in AM, and check CK. Medicine will follow up on repeat LFTs, and CK.      Edith Alfaro PA-C  Hospitalist Service     2) Per updated progress note by IM 7/4:   A/P:  #Transaminitis - slowly rising LFTs since December. Asymptomatic. Reviewed with pharmacy, and possible medications causing would  be Haldol, depakote, and statin. Discussed with psych who will taper haldol and Depakote. Holding statin, (total cholesterol 91, with LDL 41). Repeat LFTs continue to rise slightly.   -Abdominal US tomorrow (NPO after midnight)  -Hepatitis B surface ab and agn and Hep C ab   -Repeat LFTs in 3 days     Medicine will follow up on Abdominal US, viral hepatitis studies and repeat LFTs.  No further recommendations at this time. Please page the on-call SHREE for any intercurrent medical issues which arise.      Edith Alfaro PA-C  Hospitalist Service     Per progress note 7/12:      Brief Medicine Note     Medicine following peripherally for LFT monitoring.  LFTs today improved:  (182) and ALT 92 (129). T bili and ALP WNL.      Today's vital signs, medications, and nursing notes were reviewed.       /77 (BP Location: Left arm)   Pulse 110   Temp 97.8  F (36.6  C) (Oral)   Resp 16   Ht 1.829 m (6')   Wt 87.5 kg (192 lb 14.4 oz)   SpO2 95%   BMI 26.16 kg/m       Continue current plan of care. Will continue to trend CMP In 3 days to ensure continued downtrend.      Kim Harman PA-C  Hospitalist Service        3) Urinary incontinence and new stool incontinence: RN staff first reported urinary incontinence over weekend 7/9, per chart review patient has history of intermittent urinary incontinence going back to at least 2016, continues to have intermittent episodes of incontinence, most recently evening of 7/29 with now stool incontinence, have placed IM consult for further assessment of need for additional work up given pts limitations as historian, appreciate assistance     This note was created by undersigned using a Dragon dictation system. All typing errors or contextual distortion are unintentional and software inherent.     Disposition Plan   Reason for ongoing admission: is unable to care for self due to severe psychosis or mariusz and neurocognitive disorder  Discharge location: TBD, likely  locked NH facility   Discharge Medications: not ordered  Follow-up Appointments: not scheduled  Legal Status: Full MI commitment and Yanez       Dr Nickerson to resume care 8/2.     Entered by: Jeanette Dobbins on 7/30/2021 at 7:14 AM

## 2021-07-30 NOTE — PLAN OF CARE
Patient is in and out of milieu, most often to eat meals in the dining room or fill up his coffee cup. Patient responds with one or two word answers, does not initiate conversation. Asked me when he was going to get out of the hospital. Able to follow directions to give urine sample. No incontinence on this shift. Mood is calm with some irritability.

## 2021-07-30 NOTE — PLAN OF CARE
Assessment/Intervention/Current Symtoms and Care Coordination:  Current Symptoms include the following: Psychosis, AH, disorganization, and confusion  Chart Review    Discharge Plan or Goal:  Pending stabilization & development of a safe discharge plan.  Considerations include:  Locked nursing facility    Barriers to Discharge:  Patient requires further psychiatric stabilization due to current symptomology and he requires a guardian for alternative decision making.    Referral Status:  Locked nursing facility    Legal Status:  Patient is under MI commitment in [facility] with Yanez order.

## 2021-07-30 NOTE — PLAN OF CARE
"Music Therapy Group note    Clinical Hours in session: 1.0    Number of patients in group: 6    Scope of service: psychodynamic     Patient progress: baseline    Intervention: Shell and Song Analysis     Goal of group: self-reflection     Patient response/reaction to treatment intervention(s): John was talking to himself in group today.  At one point, he began to explain \"I am having a conversation with 2 people\" motioning to the air above his shoulder.  MT redirected back to the music.  He was redirectable.  Likes Classic Rock bands.  Was not able to meaningfully process the music's significance for him.  Compliant and pleasant.  Resonates with the music well.    Luba Paniagua, MT-BC  Board-Certified Music Therapist           "

## 2021-07-30 NOTE — PLAN OF CARE
Patient consented to allow a bladder scan after he voided. The largest detected scanned amount was 182 ml.

## 2021-07-31 PROCEDURE — 250N000013 HC RX MED GY IP 250 OP 250 PS 637: Performed by: PSYCHIATRY & NEUROLOGY

## 2021-07-31 PROCEDURE — 124N000002 HC R&B MH UMMC

## 2021-07-31 PROCEDURE — 250N000013 HC RX MED GY IP 250 OP 250 PS 637: Performed by: PHYSICIAN ASSISTANT

## 2021-07-31 RX ADMIN — CLOZAPINE 300 MG: 100 TABLET ORAL at 22:23

## 2021-07-31 RX ADMIN — GUAIFENESIN 600 MG: 600 TABLET, EXTENDED RELEASE ORAL at 09:03

## 2021-07-31 RX ADMIN — DOCUSATE SODIUM 100 MG: 100 CAPSULE, LIQUID FILLED ORAL at 09:03

## 2021-07-31 RX ADMIN — Medication 50 MCG: at 09:03

## 2021-07-31 RX ADMIN — MEMANTINE 10 MG: 5 TABLET ORAL at 22:23

## 2021-07-31 RX ADMIN — ASPIRIN 81 MG CHEWABLE TABLET 81 MG: 81 TABLET CHEWABLE at 09:03

## 2021-07-31 RX ADMIN — METOPROLOL TARTRATE 25 MG: 25 TABLET, FILM COATED ORAL at 09:03

## 2021-07-31 RX ADMIN — DOCUSATE SODIUM 100 MG: 100 CAPSULE, LIQUID FILLED ORAL at 22:23

## 2021-07-31 RX ADMIN — BENZTROPINE MESYLATE 1 MG: 1 TABLET ORAL at 22:23

## 2021-07-31 RX ADMIN — POLYETHYLENE GLYCOL 3350 17 G: 17 POWDER, FOR SOLUTION ORAL at 09:03

## 2021-07-31 RX ADMIN — MEMANTINE 10 MG: 5 TABLET ORAL at 09:03

## 2021-07-31 RX ADMIN — LEVOTHYROXINE SODIUM 88 MCG: 88 TABLET ORAL at 09:03

## 2021-07-31 RX ADMIN — SALMETEROL XINAFOATE 1 PUFF: 50 POWDER, METERED ORAL; RESPIRATORY (INHALATION) at 09:14

## 2021-07-31 RX ADMIN — SALMETEROL XINAFOATE 1 PUFF: 50 POWDER, METERED ORAL; RESPIRATORY (INHALATION) at 22:24

## 2021-07-31 RX ADMIN — BENZTROPINE MESYLATE 1 MG: 1 TABLET ORAL at 09:03

## 2021-07-31 RX ADMIN — METOPROLOL TARTRATE 25 MG: 25 TABLET, FILM COATED ORAL at 22:23

## 2021-07-31 RX ADMIN — GUAIFENESIN 600 MG: 600 TABLET, EXTENDED RELEASE ORAL at 22:23

## 2021-07-31 RX ADMIN — ATORVASTATIN CALCIUM 80 MG: 80 TABLET, FILM COATED ORAL at 22:23

## 2021-07-31 ASSESSMENT — ACTIVITIES OF DAILY LIVING (ADL)
ORAL_HYGIENE: PROMPTS
HYGIENE/GROOMING: PROMPTS
DRESS: INDEPENDENT
ORAL_HYGIENE: PROMPTS
DRESS: INDEPENDENT
HYGIENE/GROOMING: PROMPTS
LAUNDRY: WITH SUPERVISION
LAUNDRY: WITH SUPERVISION
DRESS: SCRUBS (BEHAVIORAL HEALTH)
LAUNDRY: UNABLE TO COMPLETE
ORAL_HYGIENE: INDEPENDENT
HYGIENE/GROOMING: INDEPENDENT

## 2021-07-31 NOTE — PLAN OF CARE
Problem: Behavioral Health Plan of Care  Goal: Plan of Care Review  Recent Flowsheet Documentation  Taken 7/31/2021 1200 by Lynn Singh RN  Plan of Care Reviewed With: patient       RN Assessment:    Patient is in the lounge for breakfast and received vital signs without prompting.  Patient ate 100% of breakfast and lunch.  Patient did not have any episodes of incontinence this shift.  Patient denies SI, SIB, HI and hallucinations.  Patient is observed talking to himself and responding to internal stimuli in the lounge.  He denies any questions or concerns. He did not receive a PRN this shift.  Continue with plan of care.

## 2021-07-31 NOTE — PLAN OF CARE
Night Shift Summary (7/30/21 into 07/31/21)    Pt in bed sleeping at start of shift. Breathing quiet and unlabored. Appears to have slept 7 hours.    Assault and Elopement precautions in addition to Single Room order. Any related events noted above.     Will continue to monitor and assess.       Problem: Behavioral Health Plan of Care  Goal: Absence of New-Onset Illness or Injury  Outcome: Improving     Problem: Sleep Disturbance  Goal: Adequate Sleep/Rest  Outcome: Improving

## 2021-07-31 NOTE — PLAN OF CARE
Problem: Cognitive Impairment (Psychotic Signs/Symptoms)  Goal: Optimal Cognitive Function (Psychotic Signs/Symptoms)  Outcome: No Change  Intervention: Support and Promote Cognitive Ability  Recent Flowsheet Documentation  Taken 7/31/2021 0000 by Luis Alberto Yo RN  Trust Relationship/Rapport:   care explained   questions encouraged    Nursing Assessment    Recent Vitals: B/P:127/88  T:98.1  P:91      General Shift Summary  Pt in milieu the majority of shift, keeps mostly to self, although did attend group. Pt had some humor with writer and affect brightened when engaged. Pt observed talking to self throughout shift. No behavioral concerns this shift, pt was calm and pleasant.     Patient is medication compliant, did not report any side effects, and received no PRN medications.    Hygiene is neglected. Pt observed to have large brown smears on bed and was given new sheets. Appetite was appropriate.     Pt was bladder scanned for post void residual of 140.     Luis Alberto Yo, RN

## 2021-07-31 NOTE — PROGRESS NOTES
07/30/21 1900   Groups   Details    (Psychotherapy)   Number of patients attending the group:  4  Group Length:  1 Hours     Group Therapy Type: Psychotherapy     Summary of Group / Topics Discussed:        The  Psychotherapy group goal is to promote insight to positive choice and change. Group processing is within a supportive and safe environment. Patients will process emotions using verbal group and expressive psychotherapy interventions including visual art/writing interventions.     Group interventions support patients by: fostering self awareness and creative self expression, verbal processing, expressing feelings     Modalities to reach these goals include:Mindfulness,and Expressive Arts Therapies     Objective/ Intervention- Goal of group and Therapeutic modality utilized- peg, DBT/ Mindfulness processing, ACT leaves on a steam     Subjective- mood-alright     Group Response- disruptive dynamic with one pt in particular     Patient Response-Pt was pleasant, cooperative, and quietly engaged. He is difficult to understand but said something about not remembering how he got here, not remembering much but that he remembers his sister's name. He has spoken about being close to this sister that he was in foster care with when he was a child. He observed only, he chose not to make art.    Azael Valdovinos, MINDY, ATR-BC

## 2021-08-01 PROCEDURE — 250N000013 HC RX MED GY IP 250 OP 250 PS 637: Performed by: PHYSICIAN ASSISTANT

## 2021-08-01 PROCEDURE — 124N000002 HC R&B MH UMMC

## 2021-08-01 PROCEDURE — 250N000013 HC RX MED GY IP 250 OP 250 PS 637: Performed by: PSYCHIATRY & NEUROLOGY

## 2021-08-01 RX ADMIN — DOCUSATE SODIUM 100 MG: 100 CAPSULE, LIQUID FILLED ORAL at 21:47

## 2021-08-01 RX ADMIN — ASPIRIN 81 MG CHEWABLE TABLET 81 MG: 81 TABLET CHEWABLE at 09:24

## 2021-08-01 RX ADMIN — SALMETEROL XINAFOATE 1 PUFF: 50 POWDER, METERED ORAL; RESPIRATORY (INHALATION) at 09:28

## 2021-08-01 RX ADMIN — GUAIFENESIN 600 MG: 600 TABLET, EXTENDED RELEASE ORAL at 09:26

## 2021-08-01 RX ADMIN — Medication 50 MCG: at 09:24

## 2021-08-01 RX ADMIN — POLYETHYLENE GLYCOL 3350 17 G: 17 POWDER, FOR SOLUTION ORAL at 09:24

## 2021-08-01 RX ADMIN — BENZTROPINE MESYLATE 1 MG: 1 TABLET ORAL at 09:26

## 2021-08-01 RX ADMIN — ATORVASTATIN CALCIUM 80 MG: 80 TABLET, FILM COATED ORAL at 21:46

## 2021-08-01 RX ADMIN — BENZTROPINE MESYLATE 1 MG: 1 TABLET ORAL at 21:46

## 2021-08-01 RX ADMIN — SALMETEROL XINAFOATE 1 PUFF: 50 POWDER, METERED ORAL; RESPIRATORY (INHALATION) at 21:47

## 2021-08-01 RX ADMIN — CLOZAPINE 300 MG: 100 TABLET ORAL at 21:47

## 2021-08-01 RX ADMIN — GUAIFENESIN 600 MG: 600 TABLET, EXTENDED RELEASE ORAL at 21:46

## 2021-08-01 RX ADMIN — MEMANTINE 10 MG: 5 TABLET ORAL at 21:46

## 2021-08-01 RX ADMIN — LEVOTHYROXINE SODIUM 88 MCG: 88 TABLET ORAL at 09:26

## 2021-08-01 RX ADMIN — METOPROLOL TARTRATE 25 MG: 25 TABLET, FILM COATED ORAL at 21:47

## 2021-08-01 RX ADMIN — DOCUSATE SODIUM 100 MG: 100 CAPSULE, LIQUID FILLED ORAL at 09:24

## 2021-08-01 RX ADMIN — MEMANTINE 10 MG: 5 TABLET ORAL at 09:26

## 2021-08-01 RX ADMIN — METOPROLOL TARTRATE 25 MG: 25 TABLET, FILM COATED ORAL at 09:25

## 2021-08-01 ASSESSMENT — ACTIVITIES OF DAILY LIVING (ADL)
LAUNDRY: WITH SUPERVISION
LAUNDRY: UNABLE TO COMPLETE
HYGIENE/GROOMING: INDEPENDENT;PROMPTS
DRESS: SCRUBS (BEHAVIORAL HEALTH);INDEPENDENT
HYGIENE/GROOMING: INDEPENDENT;PROMPTS
ORAL_HYGIENE: INDEPENDENT;PROMPTS
ORAL_HYGIENE: INDEPENDENT
DRESS: SCRUBS (BEHAVIORAL HEALTH)

## 2021-08-01 NOTE — PLAN OF CARE
Patient has spent majority of the shift in the milieu. Attended group. Denies suicidal ideation and self injurious thoughts. Denies auditory and visual hallucinations, but was actively observed talking to himself and yelling out. Ate dinner. Med compliant. Cooperative with the bladder scan. Affect is flat and tense.    Patient evaluation continues. Assessed mood,anxiety,thoughts and behavior.     Patient gradually progressing towards goals.    Patient is encouraged to participate in groups and assisted to develop healthy coping skills.     VS reviewed: /84   Pulse 94   Temp 97.7  F (36.5  C)   Resp 16   Ht 1.829 m (6')   Wt 89.2 kg (196 lb 9.6 oz)   SpO2 96%   BMI 26.66 kg/m      Length of stay: 31    Refer to daily team meeting notes for individualized plan of care. Nursing will continue to assess.

## 2021-08-01 NOTE — PLAN OF CARE
Problem: Cognitive Impairment (Psychotic Signs/Symptoms)  Goal: Optimal Cognitive Function (Psychotic Signs/Symptoms)  Intervention: Support and Promote Cognitive Ability  Recent Flowsheet Documentation  Taken 8/1/2021 1200 by Tess Vaca RN  Trust Relationship/Rapport:   care explained   thoughts/feelings acknowledged   Patient remains confused to his situation. He asked this staff to buy him a pack of cigs. Sitting out in lounge, stays to himself, little interaction.  Appetite good, sleeping ok. He denies safety concerns, appears responding at times.  Blunted affect mood calm, medication compliant.

## 2021-08-01 NOTE — PLAN OF CARE
Night Shift Summary (7/31/21 into 08/01/21)    Pt in bed sleeping at start of shift. Breathing quiet and unlabored. Pt was heard voiding a large amount of urine in his toilet during the night. Appears to have slept 6 hours.    Assault and Elopement precautions in addition to Single Room order. Any related events noted above.     Will continue to monitor and assess.       Problem: Behavioral Health Plan of Care  Goal: Absence of New-Onset Illness or Injury  Outcome: Improving   Remains safe on the unit.     Problem: Sleep Disturbance  Goal: Adequate Sleep/Rest  Outcome: Improving   Slept ? 6 hours.

## 2021-08-01 NOTE — PROGRESS NOTES
Bladder scan was completed by medical nurse flyer. PVR was 189. Patient states that he is having no issue with urinating. States that he also drinks a lot of coffee. Patient was reminded that coffee is a diuretic.

## 2021-08-02 PROCEDURE — 124N000002 HC R&B MH UMMC

## 2021-08-02 PROCEDURE — 250N000013 HC RX MED GY IP 250 OP 250 PS 637: Performed by: PSYCHIATRY & NEUROLOGY

## 2021-08-02 PROCEDURE — 99231 SBSQ HOSP IP/OBS SF/LOW 25: CPT | Performed by: PSYCHIATRY & NEUROLOGY

## 2021-08-02 PROCEDURE — 250N000013 HC RX MED GY IP 250 OP 250 PS 637: Performed by: PHYSICIAN ASSISTANT

## 2021-08-02 RX ORDER — RIVASTIGMINE 4.6 MG/24H
1 PATCH, EXTENDED RELEASE TRANSDERMAL DAILY
Status: DISCONTINUED | OUTPATIENT
Start: 2021-08-03 | End: 2021-10-01

## 2021-08-02 RX ADMIN — GUAIFENESIN 600 MG: 600 TABLET, EXTENDED RELEASE ORAL at 09:10

## 2021-08-02 RX ADMIN — CLOZAPINE 300 MG: 100 TABLET ORAL at 22:18

## 2021-08-02 RX ADMIN — METOPROLOL TARTRATE 25 MG: 25 TABLET, FILM COATED ORAL at 22:18

## 2021-08-02 RX ADMIN — MEMANTINE 10 MG: 5 TABLET ORAL at 09:10

## 2021-08-02 RX ADMIN — POLYETHYLENE GLYCOL 3350 17 G: 17 POWDER, FOR SOLUTION ORAL at 09:09

## 2021-08-02 RX ADMIN — MEMANTINE 10 MG: 5 TABLET ORAL at 22:18

## 2021-08-02 RX ADMIN — BENZTROPINE MESYLATE 1 MG: 1 TABLET ORAL at 22:18

## 2021-08-02 RX ADMIN — ASPIRIN 81 MG CHEWABLE TABLET 81 MG: 81 TABLET CHEWABLE at 09:10

## 2021-08-02 RX ADMIN — GUAIFENESIN 600 MG: 600 TABLET, EXTENDED RELEASE ORAL at 22:18

## 2021-08-02 RX ADMIN — DOCUSATE SODIUM 100 MG: 100 CAPSULE, LIQUID FILLED ORAL at 22:18

## 2021-08-02 RX ADMIN — METOPROLOL TARTRATE 25 MG: 25 TABLET, FILM COATED ORAL at 09:10

## 2021-08-02 RX ADMIN — LEVOTHYROXINE SODIUM 88 MCG: 88 TABLET ORAL at 09:10

## 2021-08-02 RX ADMIN — DOCUSATE SODIUM 100 MG: 100 CAPSULE, LIQUID FILLED ORAL at 09:10

## 2021-08-02 RX ADMIN — SALMETEROL XINAFOATE 1 PUFF: 50 POWDER, METERED ORAL; RESPIRATORY (INHALATION) at 22:20

## 2021-08-02 RX ADMIN — ATORVASTATIN CALCIUM 80 MG: 80 TABLET, FILM COATED ORAL at 22:18

## 2021-08-02 RX ADMIN — Medication 50 MCG: at 09:10

## 2021-08-02 RX ADMIN — BENZTROPINE MESYLATE 1 MG: 1 TABLET ORAL at 09:10

## 2021-08-02 ASSESSMENT — ACTIVITIES OF DAILY LIVING (ADL)
DRESS: INDEPENDENT
ORAL_HYGIENE: INDEPENDENT
LAUNDRY: WITH SUPERVISION
DRESS: SCRUBS (BEHAVIORAL HEALTH);INDEPENDENT
ORAL_HYGIENE: INDEPENDENT
LAUNDRY: WITH SUPERVISION
HYGIENE/GROOMING: INDEPENDENT
HYGIENE/GROOMING: INDEPENDENT

## 2021-08-02 NOTE — PLAN OF CARE
Problem: Psychotic Symptoms  Goal: Psychotic Symptoms  Description: Signs and symptoms of listed problems will be absent or manageable.  Outcome: No Change       Pt has spent this shift sleeping and resting in his bed and spending time in the lounge watching television with peers. Pt ate both meals and left his room around noon, linens were changed at this time. No SI/SIB/HI behaviors noted or reported this shift, pt continues to spend time in lounge talking to himself and appears to be responding. Pt continues to appear confused and oriented to self only, pt ADLs are poor, eating and sleeping well. Pt was medication compliant and denies any SE, when asked if he had any needs or questions pt only requested to be left alone to rest. Will continue to monitor and support plan of care.

## 2021-08-02 NOTE — PLAN OF CARE
Assessment/Intervention/Current Symtoms and Care Coordination  Attend Team Meeting  Review chart     Discharge Plan or Goal  Secure guardianship and refer pt to a locked nursing facility.      Barriers to Discharge   Current legal status; commitment and pursuable  guardianship. Pt is not stable enough to be able to discharge to a lower level of care at this time.      Referral Status  Pending     Legal Status  Committed withJarvis; Guardianship being pursued

## 2021-08-02 NOTE — PLAN OF CARE
Patient has spent majority of the shift in the milieu. Patient denies auditory and visual hallucinations. Denies anxiety and depression. Patient denies suicidal ideation and self injurious thoughts. Ate dinner. Med compliant. Patient continues to drink excessive amounts of coffee. Patient is observed talking to herself. Staff cleaned up his bed linen after it was observed to have staining. Affect if flat and tense.     Patient evaluation continues. Assessed mood,anxiety,thoughts and behavior.     Patient gradually progressing towards goals.    Patient is encouraged to participate in groups and assisted to develop healthy coping skills.     VS reviewed: /82 (BP Location: Right arm)   Pulse 96   Temp 97.4  F (36.3  C) (Oral)   Resp 16   Ht 1.829 m (6')   Wt 89.2 kg (196 lb 9.6 oz)   SpO2 96%   BMI 26.66 kg/m      Length of stay: 33    Refer to daily team meeting notes for individualized plan of care. Nursing will continue to assess.

## 2021-08-03 ENCOUNTER — PRE VISIT (OUTPATIENT)
Dept: GASTROENTEROLOGY | Facility: CLINIC | Age: 56
End: 2021-08-03

## 2021-08-03 PROCEDURE — 124N000002 HC R&B MH UMMC

## 2021-08-03 PROCEDURE — 250N000013 HC RX MED GY IP 250 OP 250 PS 637: Performed by: PSYCHIATRY & NEUROLOGY

## 2021-08-03 PROCEDURE — 250N000013 HC RX MED GY IP 250 OP 250 PS 637: Performed by: PHYSICIAN ASSISTANT

## 2021-08-03 RX ADMIN — SALMETEROL XINAFOATE 1 PUFF: 50 POWDER, METERED ORAL; RESPIRATORY (INHALATION) at 20:56

## 2021-08-03 RX ADMIN — MEMANTINE 10 MG: 5 TABLET ORAL at 20:52

## 2021-08-03 RX ADMIN — LEVOTHYROXINE SODIUM 88 MCG: 88 TABLET ORAL at 09:24

## 2021-08-03 RX ADMIN — GUAIFENESIN 600 MG: 600 TABLET, EXTENDED RELEASE ORAL at 09:24

## 2021-08-03 RX ADMIN — RIVASTIGMINE 1 PATCH: 4.6 PATCH TRANSDERMAL at 09:26

## 2021-08-03 RX ADMIN — METOPROLOL TARTRATE 25 MG: 25 TABLET, FILM COATED ORAL at 20:52

## 2021-08-03 RX ADMIN — MEMANTINE 10 MG: 5 TABLET ORAL at 09:23

## 2021-08-03 RX ADMIN — GUAIFENESIN 600 MG: 600 TABLET, EXTENDED RELEASE ORAL at 20:52

## 2021-08-03 RX ADMIN — ASPIRIN 81 MG CHEWABLE TABLET 81 MG: 81 TABLET CHEWABLE at 09:23

## 2021-08-03 RX ADMIN — BENZTROPINE MESYLATE 1 MG: 1 TABLET ORAL at 09:23

## 2021-08-03 RX ADMIN — DOCUSATE SODIUM 100 MG: 100 CAPSULE, LIQUID FILLED ORAL at 20:52

## 2021-08-03 RX ADMIN — BENZTROPINE MESYLATE 1 MG: 1 TABLET ORAL at 20:52

## 2021-08-03 RX ADMIN — SALMETEROL XINAFOATE 1 PUFF: 50 POWDER, METERED ORAL; RESPIRATORY (INHALATION) at 09:25

## 2021-08-03 RX ADMIN — ATORVASTATIN CALCIUM 80 MG: 80 TABLET, FILM COATED ORAL at 20:52

## 2021-08-03 RX ADMIN — Medication 50 MCG: at 09:22

## 2021-08-03 RX ADMIN — POLYETHYLENE GLYCOL 3350 17 G: 17 POWDER, FOR SOLUTION ORAL at 09:22

## 2021-08-03 RX ADMIN — CLOZAPINE 300 MG: 100 TABLET ORAL at 20:52

## 2021-08-03 RX ADMIN — DOCUSATE SODIUM 100 MG: 100 CAPSULE, LIQUID FILLED ORAL at 09:24

## 2021-08-03 RX ADMIN — METOPROLOL TARTRATE 25 MG: 25 TABLET, FILM COATED ORAL at 09:23

## 2021-08-03 ASSESSMENT — ACTIVITIES OF DAILY LIVING (ADL)
HYGIENE/GROOMING: INDEPENDENT
LAUNDRY: WITH SUPERVISION
DRESS: INDEPENDENT
ORAL_HYGIENE: INDEPENDENT

## 2021-08-03 NOTE — PLAN OF CARE
Patient has been out in the milieu for majority of the shift. Patient denies suicidal ideation and self injurious thoughts. Denies anxiety and depression. Denies auditory and visual hallucinations. Patient appears distracted and does a lot of grunting and talks to himself. Ate dinner. Med compliant. Showered. Cooperative on the unit.     Patient evaluation continues. Assessed mood,anxiety,thoughts and behavior.     Patient gradually progressing towards goals.    Patient is encouraged to participate in groups and assisted to develop healthy coping skills.     VS reviewed: /79   Pulse 87   Temp 97.8  F (36.6  C) (Oral)   Resp 16   Ht 1.829 m (6')   Wt 89.2 kg (196 lb 9.6 oz)   SpO2 97%   BMI 26.66 kg/m      Length of stay: 33    Refer to daily team meeting notes for individualized plan of care. Nursing will continue to assess.

## 2021-08-03 NOTE — PLAN OF CARE
Problem: Cognitive Impairment (Psychotic Signs/Symptoms)  Goal: Optimal Cognitive Function (Psychotic Signs/Symptoms)  Outcome: No Change      Pt out of his room observed sitting in the lounge area.    Pt denies auditory and visual hallucinations although was observed talking to himself.   Pt was med complaint.  Pt ate meals.  Pt denies SI/SIB.  Will continue to assess.

## 2021-08-03 NOTE — PLAN OF CARE
Night Shift Summary (8/2/21 into 08/03/21)    Pt in bed sleeping at start of shift. Breathing quiet and unlabored. Appears to have slept 6.5 hours.    Assault and Elopement precautions in addition to Single Room order; any related events noted above.     Will continue to monitor and assess.       Problem: Behavioral Health Plan of Care  Goal: Absence of New-Onset Illness or Injury  Outcome: Improving     Problem: Sleep Disturbance  Goal: Adequate Sleep/Rest  Outcome: Improving

## 2021-08-03 NOTE — PROGRESS NOTES
PSYCHIATRIC PROGRESS NOTE    Admission Date: 06/30/2021  Date of Service: 08/02/2021    The patient was seen, his chart reviewed, his case discussed with staff.  No significant changes noted in his behavior, his attitude and in his cognitive functioning. His Clozaril level came back within the therapeutic range. He continued to be confused with some ideas of grandeur.  He has been medication compliant.    This is a 55-year-old single white male with a lifelong history of mental illness, TBI, neurocognitive disorder and polysubstance abuse. He has a history of 3 MI commitments and one CD commitment. This time he also was committed and Jarvised. He had spent several months at [facility] and was transferred here after being seen by Dr. Wilks for psychiatric consultation. He was initially seen and followed by Dr. Morfin and has been followed by Dr. Dobbins since 07/08/21. He has been under my care since 07/14/21. His scheduled Risperdal was discontinued and he was started on Clozaril since his Yanez was amended.    MEDICATIONS   Clozaril 300 mg at bedtime   Namenda 10 mg BID   Cogentin 1 mg BID   Risperdal M-Tab 1 mg Q6H PRN   Melatonin 3 mg HS PRN     LABS: No new results     VS: T - 97.8, Pulse - 87, BP - 109/79, Resp - 16, SpO2 - 97%    MENTAL STATUS EXAMINATION   Revealed a normally built and normally developed 55-year-old white male appearing older than his stated age. His speech was slurred and difficult to understand. He presented with poverty of speech and paucity of ideas but continued to intermittently verbalize grandiose delusional material . His affect was blunted. He denied SI and HI. He was alert but oriented to self only. He could not name the current [place] President or his predecessors. He failed a draw a clock test. He admitted to auditory hallucinations  but would not elaborate on their content.  He had no insight into his problems and his judgement was impaired.      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 Start Exelon Patch 4.6 mg every 24 hours.    Juarez Nickerson MD

## 2021-08-03 NOTE — PLAN OF CARE
Assessment/Intervention/Current Symtoms and Care Coordination  Provided coverage on unit for 1/2 day.    There was no team meeting  Reviewed chart  Per chart, pt appears to be slowly progressing toward goals and should be encouraged to attend groups    Per coverage notes his [facility] CM Aneesh Chapin [phone] has said [facility] will consider him after guardianship is established.  P/c and left message for Cm to coordinate care-particularly to determine whether he had made additional referrals that could be followed up on or that hospital could provide assistance on re: placement once guardianship is obtained    Completed some research on possible placements:    NURSING HOME FACILITY STATUS   [facility]  [phone]/Fax  [phone] [address] [facility] [address]  Has locked unit 8/3 Lm vm to confirm whether they still have locked unit-Soniya called back saying they have a secure unit but pt must have dementia dx - and they have no beds currently   [facility]  [phone]/fax [phone]  [address] [facility] [place] [address]  Has locked unit 8/3 Spoke w/Babita, Admissions coordinator-she does not have any beds available   [facility]  [phone]/ Fax [phone]  [address] [place] [address]  Has a locked unit 8/3 Confirmed that they do have locked unit - can call Erendira in Admissions: [phone] or [phone]   [facility]  [phone]/Fax: [phone]   8/3 They do not have a locked unit - they do have memory care, however           Discharge Plan or Goal  Secure guardianship and refer pt to a locked nursing facility.      Barriers to Discharge   Current legal status; commitment and pursuable   guardianship. Pt is not stable enough to be able to discharge to a lower level of care at this time.      Referral Status  Pending     Legal Status  Committed withJarvis; Guardianship being pursued

## 2021-08-04 PROCEDURE — 99231 SBSQ HOSP IP/OBS SF/LOW 25: CPT | Performed by: PSYCHIATRY & NEUROLOGY

## 2021-08-04 PROCEDURE — 250N000013 HC RX MED GY IP 250 OP 250 PS 637: Performed by: PSYCHIATRY & NEUROLOGY

## 2021-08-04 PROCEDURE — 124N000002 HC R&B MH UMMC

## 2021-08-04 PROCEDURE — H2032 ACTIVITY THERAPY, PER 15 MIN: HCPCS

## 2021-08-04 PROCEDURE — 250N000013 HC RX MED GY IP 250 OP 250 PS 637: Performed by: PHYSICIAN ASSISTANT

## 2021-08-04 RX ADMIN — DOCUSATE SODIUM 100 MG: 100 CAPSULE, LIQUID FILLED ORAL at 09:02

## 2021-08-04 RX ADMIN — GUAIFENESIN 600 MG: 600 TABLET, EXTENDED RELEASE ORAL at 20:27

## 2021-08-04 RX ADMIN — POLYETHYLENE GLYCOL 3350 17 G: 17 POWDER, FOR SOLUTION ORAL at 09:02

## 2021-08-04 RX ADMIN — LEVOTHYROXINE SODIUM 88 MCG: 88 TABLET ORAL at 09:01

## 2021-08-04 RX ADMIN — MEMANTINE 10 MG: 5 TABLET ORAL at 20:27

## 2021-08-04 RX ADMIN — BENZTROPINE MESYLATE 1 MG: 1 TABLET ORAL at 20:27

## 2021-08-04 RX ADMIN — MEMANTINE 10 MG: 5 TABLET ORAL at 09:01

## 2021-08-04 RX ADMIN — METOPROLOL TARTRATE 25 MG: 25 TABLET, FILM COATED ORAL at 09:01

## 2021-08-04 RX ADMIN — CLOZAPINE 300 MG: 100 TABLET ORAL at 20:28

## 2021-08-04 RX ADMIN — BENZTROPINE MESYLATE 1 MG: 1 TABLET ORAL at 09:01

## 2021-08-04 RX ADMIN — Medication 50 MCG: at 09:02

## 2021-08-04 RX ADMIN — ATORVASTATIN CALCIUM 80 MG: 80 TABLET, FILM COATED ORAL at 20:27

## 2021-08-04 RX ADMIN — RIVASTIGMINE 1 PATCH: 4.6 PATCH TRANSDERMAL at 09:02

## 2021-08-04 RX ADMIN — METOPROLOL TARTRATE 25 MG: 25 TABLET, FILM COATED ORAL at 20:27

## 2021-08-04 RX ADMIN — GUAIFENESIN 600 MG: 600 TABLET, EXTENDED RELEASE ORAL at 09:01

## 2021-08-04 RX ADMIN — DOCUSATE SODIUM 100 MG: 100 CAPSULE, LIQUID FILLED ORAL at 20:27

## 2021-08-04 ASSESSMENT — ACTIVITIES OF DAILY LIVING (ADL)
LAUNDRY: WITH SUPERVISION
DRESS: INDEPENDENT
ORAL_HYGIENE: INDEPENDENT
HYGIENE/GROOMING: INDEPENDENT

## 2021-08-04 NOTE — PLAN OF CARE
Problem: Cognitive Impairment (Psychotic Signs/Symptoms)  Goal: Optimal Cognitive Function (Psychotic Signs/Symptoms)  Outcome: No Change       John has been visible in the milieu.  Pt denies auditory and visual hallucinations although appears to be responding.  Pt observed talking and smiling to himself.  Pt has a good appetite ate both meals.  Pt denies SI/SIB.  Will continue to assess.

## 2021-08-04 NOTE — PROGRESS NOTES
PSYCHIATRIC PROGRESS NOTE    The patient was seen, his chart reviewed, his case discussed with staff at the Team Meeting.  Reportedly, he showed no significant change in his condition. He continued to show some diurnal activity changes whereby he would spent mornings in his bed and would spent the rest of thew day in the lounge with no interactions with peers or staff.  He seems to have tolerated Exelon Patch application without any problems.  Today he presented with no complaints.    This is a 55-year-old single white male with a lifelong history of mental illness, TBI, neurocognitive disorder and polysubstance abuse. He has a history of 3 MI commitments and one CD commitment. This time he also was committed and Jarvised. He had spent several months at [facility] and was transferred here after being seen by Dr. Wilks for psychiatric consultation. He was initially seen and followed by Dr. Morfin and has been followed by Dr. Dobbins since 07/08/21. He has been under my care since 07/14/21. His scheduled Risperdal was discontinued and he was started on Clozaril since his Yanez was amended.     MEDICATIONS   Clozaril 300 mg at bedtime   Namenda 10 mg BID   Cogentin 1 mg BID   Exelon Patch 4.6 mg Q24H  Risperdal M-Tab 1 mg Q6H PRN   Melatonin 3 mg HS PRN     LABS: No new results     VS: T - 97.6, Pulse - 94, BP - 105/76, Resp - 16, SpO2 - 98%     MENTAL STATUS EXAMINATION   Revealed a normally built and normally developed 55-year-old white male appearing older than his stated age. His speech was slurred and difficult to understand. He presented with poverty of speech and paucity of ideas but continued to intermittently verbalize grandiose delusional material . His affect was blunted. He denied SI and HI. He was alert but oriented to self only. He admitted to auditory hallucinations but would not elaborate on their content. He had no insight into his problems and his judgement was impaired.  He continued to present with marked impairment of his memory and cognition.    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    PLAN: Continue current medications without change. Disposition TBD    Juarez Nickerson MD

## 2021-08-04 NOTE — PLAN OF CARE
Problem: General Rehab Plan of Care  Goal: Therapeutic Recreation/Music Therapy Goal  Description: The patient and/or their representative will achieve their patient-specific goals related to the plan of care.  Outcome: No Change     John attended art therapy group with one other peer. John participated in coloring a coloring sheet that featured a boat. This brought up some memories of being at a felder when John was young. Writer continued asking questions about the lake and John talked about memories of fishing and being on a boat. He smiled and laughed twice while talking about these memories- a quiroz contrast from his usually flat affect. Male peer added his own memories of a lake to the conversation. John did not acknowledge or respond to peer. John started talking about other subjects that appeared to be delusions such winning the lottery several times and having many duffle bags full of money. He appeared to be responding to internal stimuli at times. Writer redirected John back to conversations about reality. He left group early.

## 2021-08-04 NOTE — PLAN OF CARE
Assessment/Intervention/Current Symtoms and Care Coordination  -Chart review  -Pre round meeting with team  -Rounded with team, addressed patient needs/concerns  -Post round meeting with team  Current Symptoms include the following:  dementia symptoms      Updated medical records were sent to outside  who is looking for a guardian for the pt.  I have asked for an update.  The  has continued to look for a guardian but still has not found one,mostly due to the low reimbursement that [facility] pays.        Discharge Plan or Goal  Pending stabilization & development of a safe discharge plan.  Considerations include:locked nursing home    Barriers to Discharge  Patient requires senior care care.    Referral Status  Have been made to nursing facilities.    Legal Status  Commitment with Beau

## 2021-08-05 PROCEDURE — 250N000013 HC RX MED GY IP 250 OP 250 PS 637: Performed by: PSYCHIATRY & NEUROLOGY

## 2021-08-05 PROCEDURE — 250N000013 HC RX MED GY IP 250 OP 250 PS 637: Performed by: PHYSICIAN ASSISTANT

## 2021-08-05 PROCEDURE — 124N000002 HC R&B MH UMMC

## 2021-08-05 RX ADMIN — LEVOTHYROXINE SODIUM 88 MCG: 88 TABLET ORAL at 08:42

## 2021-08-05 RX ADMIN — BENZTROPINE MESYLATE 1 MG: 1 TABLET ORAL at 21:34

## 2021-08-05 RX ADMIN — Medication 50 MCG: at 08:42

## 2021-08-05 RX ADMIN — MEMANTINE 10 MG: 5 TABLET ORAL at 08:42

## 2021-08-05 RX ADMIN — CLOZAPINE 300 MG: 100 TABLET ORAL at 21:34

## 2021-08-05 RX ADMIN — BENZTROPINE MESYLATE 1 MG: 1 TABLET ORAL at 08:42

## 2021-08-05 RX ADMIN — ATORVASTATIN CALCIUM 80 MG: 80 TABLET, FILM COATED ORAL at 21:33

## 2021-08-05 RX ADMIN — ASPIRIN 81 MG CHEWABLE TABLET 81 MG: 81 TABLET CHEWABLE at 08:42

## 2021-08-05 RX ADMIN — METOPROLOL TARTRATE 25 MG: 25 TABLET, FILM COATED ORAL at 21:33

## 2021-08-05 RX ADMIN — SALMETEROL XINAFOATE 1 PUFF: 50 POWDER, METERED ORAL; RESPIRATORY (INHALATION) at 08:47

## 2021-08-05 RX ADMIN — METOPROLOL TARTRATE 25 MG: 25 TABLET, FILM COATED ORAL at 08:42

## 2021-08-05 RX ADMIN — DOCUSATE SODIUM 100 MG: 100 CAPSULE, LIQUID FILLED ORAL at 08:43

## 2021-08-05 RX ADMIN — GUAIFENESIN 600 MG: 600 TABLET, EXTENDED RELEASE ORAL at 21:34

## 2021-08-05 RX ADMIN — POLYETHYLENE GLYCOL 3350 17 G: 17 POWDER, FOR SOLUTION ORAL at 08:43

## 2021-08-05 RX ADMIN — MEMANTINE 10 MG: 5 TABLET ORAL at 21:34

## 2021-08-05 RX ADMIN — CHOLECALCIFEROL CAP 1.25 MG (50000 UNIT) 1250 MCG: 1.25 CAP at 13:05

## 2021-08-05 RX ADMIN — DOCUSATE SODIUM 100 MG: 100 CAPSULE, LIQUID FILLED ORAL at 21:33

## 2021-08-05 RX ADMIN — MELATONIN TAB 3 MG 3 MG: 3 TAB at 21:33

## 2021-08-05 RX ADMIN — GUAIFENESIN 600 MG: 600 TABLET, EXTENDED RELEASE ORAL at 08:42

## 2021-08-05 RX ADMIN — RIVASTIGMINE 1 PATCH: 4.6 PATCH TRANSDERMAL at 08:43

## 2021-08-05 ASSESSMENT — ACTIVITIES OF DAILY LIVING (ADL)
LAUNDRY: WITH SUPERVISION
DRESS: SCRUBS (BEHAVIORAL HEALTH)
HYGIENE/GROOMING: PROMPTS
ORAL_HYGIENE: PROMPTS
LAUNDRY: WITH SUPERVISION
HYGIENE/GROOMING: PROMPTS
DRESS: SCRUBS (BEHAVIORAL HEALTH);INDEPENDENT
ORAL_HYGIENE: PROMPTS

## 2021-08-05 NOTE — PLAN OF CARE
"  Problem: Behavioral Health Plan of Care  Goal: Plan of Care Review  Recent Flowsheet Documentation  Taken 8/5/2021 1300 by Lynn Singh RN  Plan of Care Reviewed With: patient     Patient is assessed for suicidal risk and mental health symptoms.  Patient spent time in the milieu and in his room.  Patient ate breakfast and lunch in the lounge.  He is compliant with medications.  Patient has a patch application.  The previous patch must be removed prior to applying the new patch.  Patient became verbally upset when directed to remove his sweat shirt to allow the nurse to remove the patch.  Patient yelled, \" why are you bothering me?\"  Patient response to suicidal questions were a soft no.  Patients appeite is good.  Denies any questions or concerns.  Continue with plan of care.   "

## 2021-08-05 NOTE — PLAN OF CARE
Pt asleep at start of shift. Breathing quiet and unlabored.     Pt had no c/o pain or discomfort during the HS.     Appears to have slept 6.75 hours.     Pt on ASSAULT and ELOPEMENT precautions in addition to single room order. Any related events noted above.     Will continue to monitor and assess.   Problem: Sleep Disturbance  Goal: Adequate Sleep/Rest  Outcome: No Change

## 2021-08-05 NOTE — PLAN OF CARE
Assessment/Intervention/Current Symtoms and Care Coordination  -Chart review    Pt was visible on the unit. It appears that there are no behavioral issues.    Discharge Plan or Goal  Considerations include: locked nursing home      Barriers to Discharge  No guardianship available       Referral Status  Referrals to more guardianship agencies have not yielded any availability       Nursing Home Referrrals  [facility]  [phone]/Fax  [phone] [address] [facility] [address]  Has locked unit    Locked unit    Need dementia diagnosis  8/3 - Per Zulma, Soniya says they need dementia diagnosis and no beds currently   [facility]  [phone]/fax [phone] [address] [facility] [place] [address]  Has locked unit    Locked unit 8/3 - Per Zulma, no beds available   [facility]  [phone]/ Fax [phone]  [address] [place] [address]  Has a locked unit   [facility]  [phone]  [phone]     Locked Unit  8/5/21 - left a vm for Erendira   [facility] Phone:  [phone]  Fax: [phone] Hard to place pt s    no locked beds    requiring vaccination 7/15/21 @ 4PM  I spoke with Admissions. They have one locked bed for a male who is vaccinated. Pt is not vaccinated. There is concern about his ability to get a vaccination without a decision maker. So no referral was made.   [facility] Phone: [phone]  7/15/21 @ 4:15PM  Liz in Admission says they have no locked bed and there is a lake right outside the facility.   [facility]  [address] [facility]  [place]  Nnemma @[phone]    [email or web address]  Jeremias gave me this contact as someone who agreed to take this pt.     7/12/21@2:15PM  Left vm for Nnemma   3:30PM  Received call back. She said they would take him with the commitment and the rep payee.   However after we hung up I realized I didn t clarify the setting type. I e-mailed her to get this  information. It looks like it could be only customized living. She wrote back and said this:  This is a customized living facility with a 24/7 awake  staff. It has a potential for a surveillance camera placement but it s not a locked unit.   Thanks      Company:[employer]   Facility:[facility] [address] [place] [address]      Transition Liaison:  Marilyn  [phone]  Fax: [phone]      Direct to Facility  [phone]    Jeremias gave me this contact as someone who agreed to take this pt.     7/12/21@10:15AM   left vm for Marilyn  3:45 called again but did not leave vm    7/13/21@2pm   left vm. Marilyn called me back. She said that they had denied this case last year due to the pt s aggression. I explained the administrative proposal and that the pt may be improved since then. She agreed to talk to the  at [facility] re: the administrative proposal and I agrees to send notes to show then updated clinical information. I faxed notes.      7/15/21   @9:20AM  Left vm for Marilyn for an update  @9:25  Left vm for Becky in  on the 2nd floor  Marilyn called back and said that the  is still requiring that the pt have a guardian.   [facility]  [phone]  7/12/21@10:30AM   I reached Jimmy spencer and explained the situation. She said they still would not take the pt even if under a commitment and Yanez. She states it is a state mandate especially in a secure unit.     [facility]        No locked unit Pilar  [phone]  7/12/21@10:40AM   Pilar said they do not have a locked unit. She recommends their sister facility [facility] which has a wander-guard system. I discussed this with the provider and we both agree that the pt is not appropriate for a wander-guard system.    7/15/21 @1:45PM  ADAMARIS Hoffman) is going to talk to [facility] about this client as we may have to consider other options since the locked secure units  won t take him with out a decision maker.     [facility]  [address]  [place], [place]      No locked unit, it is a secure unit with wanderguard [phone]  Cell: [phone]  7/12/21 @ 2:50PM  Could not reach the right staff  3:50PM left vm for admissions staff. They called back and said there is No locked unit, it is a secure unit with wanderguard       [facility]      No locked unit   7/12/21 @ 3:05PM  Was told there was no locked unit   [facility]      No locked unit [phone]  Fax:[phone]  7/12/21 @ 2:40PM  They do not have a locked unit.   [facility]    7/12/21 - Called,  Neurocognitive Program  no locked unit   [facility] [phone]  7/12/21 - Called  Neurocognitive Program. Left VM.     [facility] [address]  7/12/21 - Called,  Neurocognitive Program  no locked unit   [facility] [phone]     [facility] [phone]     [facility] [phone] Fax:[phone]    [facility] [phone]       [facility]  [phone]  Cell:[phone] Fax:[phone]    [facility] [phone] Fax:[phone]                    Legal Status  Committed with Beau

## 2021-08-05 NOTE — PLAN OF CARE
Problem: Cognitive Impairment (Psychotic Signs/Symptoms)  Goal: Optimal Cognitive Function (Psychotic Signs/Symptoms)  Outcome: No Change      Pt was visible in the lounge area watching TV.  Pt denies auditory and visual hallucinations. Pt has a good appetite, ate meals.  Pt was med compliant.  No behavioral concerns.  Will continue to assess.

## 2021-08-06 LAB
BASOPHILS # BLD AUTO: 0.1 10E3/UL (ref 0–0.2)
BASOPHILS NFR BLD AUTO: 2 %
EOSINOPHIL # BLD AUTO: 1.1 10E3/UL (ref 0–0.7)
EOSINOPHIL NFR BLD AUTO: 19 %
ERYTHROCYTE [DISTWIDTH] IN BLOOD BY AUTOMATED COUNT: 13.3 % (ref 10–15)
HCT VFR BLD AUTO: 47 % (ref 40–53)
HGB BLD-MCNC: 16.1 G/DL (ref 13.3–17.7)
HOLD SPECIMEN: NORMAL
IMM GRANULOCYTES # BLD: 0 10E3/UL
IMM GRANULOCYTES NFR BLD: 1 %
LYMPHOCYTES # BLD AUTO: 1.3 10E3/UL (ref 0.8–5.3)
LYMPHOCYTES NFR BLD AUTO: 22 %
MCH RBC QN AUTO: 32.1 PG (ref 26.5–33)
MCHC RBC AUTO-ENTMCNC: 34.3 G/DL (ref 31.5–36.5)
MCV RBC AUTO: 94 FL (ref 78–100)
MONOCYTES # BLD AUTO: 0.4 10E3/UL (ref 0–1.3)
MONOCYTES NFR BLD AUTO: 7 %
NEUTROPHILS # BLD AUTO: 2.9 10E3/UL (ref 1.6–8.3)
NEUTROPHILS NFR BLD AUTO: 49 %
NRBC # BLD AUTO: 0 10E3/UL
NRBC BLD AUTO-RTO: 0 /100
PLATELET # BLD AUTO: 230 10E3/UL (ref 150–450)
RBC # BLD AUTO: 5.01 10E6/UL (ref 4.4–5.9)
WBC # BLD AUTO: 5.9 10E3/UL (ref 4–11)

## 2021-08-06 PROCEDURE — 250N000013 HC RX MED GY IP 250 OP 250 PS 637: Performed by: PHYSICIAN ASSISTANT

## 2021-08-06 PROCEDURE — 99232 SBSQ HOSP IP/OBS MODERATE 35: CPT | Performed by: PSYCHIATRY & NEUROLOGY

## 2021-08-06 PROCEDURE — 250N000013 HC RX MED GY IP 250 OP 250 PS 637: Performed by: PSYCHIATRY & NEUROLOGY

## 2021-08-06 PROCEDURE — 124N000002 HC R&B MH UMMC

## 2021-08-06 PROCEDURE — 85025 COMPLETE CBC W/AUTO DIFF WBC: CPT | Performed by: PSYCHIATRY & NEUROLOGY

## 2021-08-06 PROCEDURE — 36415 COLL VENOUS BLD VENIPUNCTURE: CPT | Performed by: PSYCHIATRY & NEUROLOGY

## 2021-08-06 RX ADMIN — BENZTROPINE MESYLATE 1 MG: 1 TABLET ORAL at 08:55

## 2021-08-06 RX ADMIN — MEMANTINE 10 MG: 5 TABLET ORAL at 08:55

## 2021-08-06 RX ADMIN — RISPERIDONE 1 MG: 1 TABLET, ORALLY DISINTEGRATING ORAL at 15:41

## 2021-08-06 RX ADMIN — POLYETHYLENE GLYCOL 3350 17 G: 17 POWDER, FOR SOLUTION ORAL at 08:55

## 2021-08-06 RX ADMIN — ASPIRIN 81 MG CHEWABLE TABLET 81 MG: 81 TABLET CHEWABLE at 08:55

## 2021-08-06 RX ADMIN — DOCUSATE SODIUM 100 MG: 100 CAPSULE, LIQUID FILLED ORAL at 08:55

## 2021-08-06 RX ADMIN — LEVOTHYROXINE SODIUM 88 MCG: 88 TABLET ORAL at 08:55

## 2021-08-06 RX ADMIN — MEMANTINE 10 MG: 5 TABLET ORAL at 20:53

## 2021-08-06 RX ADMIN — GUAIFENESIN 600 MG: 600 TABLET, EXTENDED RELEASE ORAL at 20:52

## 2021-08-06 RX ADMIN — Medication 50 MCG: at 08:55

## 2021-08-06 RX ADMIN — BENZTROPINE MESYLATE 1 MG: 1 TABLET ORAL at 20:53

## 2021-08-06 RX ADMIN — RIVASTIGMINE 1 PATCH: 4.6 PATCH TRANSDERMAL at 10:46

## 2021-08-06 RX ADMIN — DOCUSATE SODIUM 100 MG: 100 CAPSULE, LIQUID FILLED ORAL at 20:52

## 2021-08-06 RX ADMIN — GUAIFENESIN 600 MG: 600 TABLET, EXTENDED RELEASE ORAL at 08:55

## 2021-08-06 RX ADMIN — CLOZAPINE 300 MG: 100 TABLET ORAL at 20:52

## 2021-08-06 RX ADMIN — METOPROLOL TARTRATE 25 MG: 25 TABLET, FILM COATED ORAL at 08:55

## 2021-08-06 RX ADMIN — METOPROLOL TARTRATE 25 MG: 25 TABLET, FILM COATED ORAL at 20:52

## 2021-08-06 RX ADMIN — SALMETEROL XINAFOATE 1 PUFF: 50 POWDER, METERED ORAL; RESPIRATORY (INHALATION) at 21:37

## 2021-08-06 RX ADMIN — ATORVASTATIN CALCIUM 80 MG: 80 TABLET, FILM COATED ORAL at 20:52

## 2021-08-06 ASSESSMENT — ACTIVITIES OF DAILY LIVING (ADL)
HYGIENE/GROOMING: PROMPTS
DRESS: SCRUBS (BEHAVIORAL HEALTH)
ORAL_HYGIENE: INDEPENDENT
LAUNDRY: WITH SUPERVISION

## 2021-08-06 NOTE — PLAN OF CARE
"  Problem: Cognitive Impairment (Psychotic Signs/Symptoms)  Goal: Optimal Cognitive Function (Psychotic Signs/Symptoms)  Outcome: No Change       Pt spent most of the shift in bed.  Pt denies auditory and visual hallucination.  Pt denies pain reports \"am tired\".  Pt took his medication but declined the patch stating \"I do not want the nicotine patch\".  Explained to pt it was not a nicotine patch but pt was adamant he would not take it and did swear \"Fuck\" a couple of times.  This writer re-approached pt again.  Pt was still lying in bed and he did agree to the patch without any hesitation.  Good appetite.  No SI/SIB.  Will continue to assess.    "

## 2021-08-06 NOTE — PROGRESS NOTES
PSYCHIATRIC PROGRESS NOTE    Admission Date: 06/30/2021  Date of Service: 08/06/2021    The patient was seen, his chart reviewed, his case discussed with staff at the Team Meeting.  It should be noted that he still has no guardian and the plan was to approach [facility] Attorney's Office in this regard  No significant changes were noted in his condition. He continued to spend most of the mornings in bed and the rest of the days in the lounge. Today he refused to have an Exelon Patch. He thought it was a Nicotine Patch and he didn't need it. He continued to refuse it even after he was explained the purpose of the patch and reassured that it wasn't a Nicotine Patch. Finally, by 12 noon he agreed to have it.  Today he c/o feeling tired but voiced no other concerns.    This is a 55-year-old single white male with a lifelong history of mental illness, TBI, neurocognitive disorder and polysubstance abuse. He has a history of 3 MI commitments and one CD commitment. This time he also was committed and Jarvised. He had spent several months at [facility] and was transferred here after being seen by Dr. Wilks for psychiatric consultation. He was initially seen and followed by Dr. Morfin and has been followed by Dr. Dobbins since 07/08/21. He has been under my care since 07/14/21. His scheduled Risperdal was discontinued and he was started on Clozaril since his Yanez was amended. Subsequently I started him on Namenda and Exelon Patch    MEDICATIONS   Clozaril 300 mg at bedtime   Namenda 10 mg BID   Cogentin 1 mg BID   Exelon Patch 4.6 mg Q24H   Risperdal M-Tab 1 mg Q6H PRN   Melatonin 3 mg HS PRN     LABS: His CBC this morning was WNL except for still elevated Absolute Neutrophils at 1.1    VS: T - 97.6, Pulse - 99, BP - 116/76, Resp - 16, SpO2 - 97%     MENTAL STATUS EXAMINATION   Revealed a normally built and normally developed 55-year-old white male appearing older than his stated age. His  speech was slurred and difficult to understand. He presented with poverty of speech and paucity of ideas but continued to intermittently verbalize grandiose delusional material . His affect was blunted. He denied SI and HI. He was alert but oriented to self only. He denied hallucinations.. He had no insight into his problems and his judgement was impaired. He continued to present with marked impairment of his memory and cognition.     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     PLAN: Continue current medications without change. Disposition TBD     Juarez Nickerson MD

## 2021-08-06 NOTE — PLAN OF CARE
Assessment/Intervention/Current Symtoms and Care Coordination  -Chart review  -Pre round meeting with team  -Rounded with team, addressed patient needs/concerns  -Post round meeting with team  Current Symptoms include the following: disorganization, confusion and patient is irritable      He seems to have tolerated Exelon Patch application without any problems. This was started for dementia symptoms. Pt believes that this is a nicotine patch and doesnt want to take it. This patch has to be given every morning.      Discharge Plan or Goal  Pending stabilization & development of a safe discharge plan.  Considerations include: locked nursing home    Barriers to Discharge  No guardian is available for patient and [facility] a [facility]  Management is going to approach the [facility] Attorney's Office      Referral Status  Guardian and nursing home referrals are being placed      Legal Status  Patient is under MI commitment in [facility]

## 2021-08-06 NOTE — PLAN OF CARE
Problem: Cognitive Impairment (Psychotic Signs/Symptoms)  Goal: Optimal Cognitive Function (Psychotic Signs/Symptoms)  Outcome: No Change    John has been watching television all evening.  His appetite has been good. His behavior has been appropriate on the unit. He has been medication compliant. He continues to be on elopement and Assault precautions. Nursing to continue to support current plan of care.

## 2021-08-06 NOTE — PLAN OF CARE
BEHAVIORAL TEAM DISCUSSION    Participants:   Dr. Nickerson, Marylu WEN, Radha Donnelly RN      Progress:   This is day 37 of this admission here after a long stay at [facility].  The team discussed whether the pt does have a diagnoses of dementia. It has been a Rule Out but the pt has been given the Exelon Patch which is a treatment for dementia.   Pt keeps thinking that the Exelon Patch is a nicotine patch and he does not want a nicotine patch.  Pt does sit in the lounge and watches TV and continues to talk to himself.    Anticipated length of stay:   2 months    Continued Stay Criteria/Rationale:   The pt does not have a safe place to discharge.      Medical/Physical:   He seems to have tolerated Exelon Patch application without any problems.    Precautions:   Behavioral Orders   Procedures     Assault precautions     Code 2     Elopement precautions     Routine Programming     As clinically indicated     Single Room     Status 15     Every 15 minutes.     Plan:   Continue to problem solve barriers to placement  Continue to evaluate for cognitive symptoms        Rationale for change in precautions or plan: no changes

## 2021-08-07 PROCEDURE — 250N000013 HC RX MED GY IP 250 OP 250 PS 637: Performed by: PHYSICIAN ASSISTANT

## 2021-08-07 PROCEDURE — 250N000013 HC RX MED GY IP 250 OP 250 PS 637: Performed by: PSYCHIATRY & NEUROLOGY

## 2021-08-07 PROCEDURE — 124N000002 HC R&B MH UMMC

## 2021-08-07 RX ADMIN — MELATONIN TAB 3 MG 3 MG: 3 TAB at 20:15

## 2021-08-07 RX ADMIN — MEMANTINE 10 MG: 5 TABLET ORAL at 08:27

## 2021-08-07 RX ADMIN — ATORVASTATIN CALCIUM 80 MG: 80 TABLET, FILM COATED ORAL at 20:12

## 2021-08-07 RX ADMIN — GUAIFENESIN 600 MG: 600 TABLET, EXTENDED RELEASE ORAL at 08:26

## 2021-08-07 RX ADMIN — GUAIFENESIN 600 MG: 600 TABLET, EXTENDED RELEASE ORAL at 20:12

## 2021-08-07 RX ADMIN — POLYETHYLENE GLYCOL 3350 17 G: 17 POWDER, FOR SOLUTION ORAL at 08:26

## 2021-08-07 RX ADMIN — CLOZAPINE 300 MG: 100 TABLET ORAL at 20:12

## 2021-08-07 RX ADMIN — LEVOTHYROXINE SODIUM 88 MCG: 88 TABLET ORAL at 08:26

## 2021-08-07 RX ADMIN — ASPIRIN 81 MG CHEWABLE TABLET 81 MG: 81 TABLET CHEWABLE at 08:27

## 2021-08-07 RX ADMIN — BENZTROPINE MESYLATE 1 MG: 1 TABLET ORAL at 20:12

## 2021-08-07 RX ADMIN — SALMETEROL XINAFOATE 1 PUFF: 50 POWDER, METERED ORAL; RESPIRATORY (INHALATION) at 08:27

## 2021-08-07 RX ADMIN — RIVASTIGMINE 1 PATCH: 4.6 PATCH TRANSDERMAL at 08:28

## 2021-08-07 RX ADMIN — DOCUSATE SODIUM 100 MG: 100 CAPSULE, LIQUID FILLED ORAL at 08:26

## 2021-08-07 RX ADMIN — METOPROLOL TARTRATE 25 MG: 25 TABLET, FILM COATED ORAL at 08:26

## 2021-08-07 RX ADMIN — MEMANTINE 10 MG: 5 TABLET ORAL at 20:12

## 2021-08-07 RX ADMIN — Medication 50 MCG: at 08:26

## 2021-08-07 RX ADMIN — DOCUSATE SODIUM 100 MG: 100 CAPSULE, LIQUID FILLED ORAL at 20:12

## 2021-08-07 RX ADMIN — BENZTROPINE MESYLATE 1 MG: 1 TABLET ORAL at 08:27

## 2021-08-07 RX ADMIN — METOPROLOL TARTRATE 25 MG: 25 TABLET, FILM COATED ORAL at 20:12

## 2021-08-07 RX ADMIN — SALMETEROL XINAFOATE 1 PUFF: 50 POWDER, METERED ORAL; RESPIRATORY (INHALATION) at 20:15

## 2021-08-07 RX ADMIN — RISPERIDONE 1 MG: 1 TABLET, ORALLY DISINTEGRATING ORAL at 18:54

## 2021-08-07 ASSESSMENT — ACTIVITIES OF DAILY LIVING (ADL)
DRESS: SCRUBS (BEHAVIORAL HEALTH)
LAUNDRY: WITH SUPERVISION
HYGIENE/GROOMING: PROMPTS
ORAL_HYGIENE: INDEPENDENT

## 2021-08-07 NOTE — PLAN OF CARE
Problem: Psychotic Symptoms  Goal: Psychotic Symptoms  Description: Signs and symptoms of listed problems will be absent or manageable.  Outcome: No Change         John presents with a flat blunted affect.  Mood is calm.  Pt denies auditory and visual hallucinations.  Pt denies suicidal ideation.  Pt was med complaint, no medication side effects reported or noted.  Good appetite ate all his meals.  Will continue to assess.

## 2021-08-07 NOTE — PLAN OF CARE
"  Problem: Psychotic Symptoms  Goal: Psychotic Symptoms  Description: Signs and symptoms of listed problems will be absent or manageable.  Outcome: No Change  Flowsheets (Taken 8/6/2021 2055)  Psychotic Symptoms Assessed: all  Psychotic Symptoms Present:    anxiety    insight    memory deficits    orientation    thought process    affect    mood    sleep     John has been responding to auditory hallucinations all evening, at times he has been yelling at them. He Has has been confused this evening. He walked into another patient's room by mistake. He was also requesting his shoes this evening because \"I'm going to go out and see a movie.\" He momentarily lost his dinner this evening when he got unit(s) from dinner, walked away, returned and couldn't find it again until a staff member directed him to it. He has been compliant with his medications. Denies any SI/SIB/AH/VH. His affect remains flat and blunted. He continues to appear disheveled and unkept. Nursing to continue to support current plan of care.  "

## 2021-08-07 NOTE — PLAN OF CARE
Plan of care   Problem: Sleep Disturbance  Goal: Adequate Sleep/Rest  Outcome: Improving  Received pt sleeping with regular unlabored respiration; NO PRN was requested or administered; pt appeared have slept for 6.25 hrs; will continue to monitor and assess.

## 2021-08-08 PROCEDURE — 250N000013 HC RX MED GY IP 250 OP 250 PS 637: Performed by: PSYCHIATRY & NEUROLOGY

## 2021-08-08 PROCEDURE — 250N000013 HC RX MED GY IP 250 OP 250 PS 637: Performed by: PHYSICIAN ASSISTANT

## 2021-08-08 PROCEDURE — 124N000002 HC R&B MH UMMC

## 2021-08-08 RX ADMIN — METOPROLOL TARTRATE 25 MG: 25 TABLET, FILM COATED ORAL at 08:53

## 2021-08-08 RX ADMIN — GUAIFENESIN 600 MG: 600 TABLET, EXTENDED RELEASE ORAL at 20:13

## 2021-08-08 RX ADMIN — METOPROLOL TARTRATE 25 MG: 25 TABLET, FILM COATED ORAL at 20:13

## 2021-08-08 RX ADMIN — MELATONIN TAB 3 MG 3 MG: 3 TAB at 20:15

## 2021-08-08 RX ADMIN — MEMANTINE 10 MG: 5 TABLET ORAL at 20:13

## 2021-08-08 RX ADMIN — CLOZAPINE 300 MG: 100 TABLET ORAL at 20:13

## 2021-08-08 RX ADMIN — BENZTROPINE MESYLATE 1 MG: 1 TABLET ORAL at 08:53

## 2021-08-08 RX ADMIN — POLYETHYLENE GLYCOL 3350 17 G: 17 POWDER, FOR SOLUTION ORAL at 08:53

## 2021-08-08 RX ADMIN — GUAIFENESIN 600 MG: 600 TABLET, EXTENDED RELEASE ORAL at 08:53

## 2021-08-08 RX ADMIN — SALMETEROL XINAFOATE 1 PUFF: 50 POWDER, METERED ORAL; RESPIRATORY (INHALATION) at 20:14

## 2021-08-08 RX ADMIN — DOCUSATE SODIUM 100 MG: 100 CAPSULE, LIQUID FILLED ORAL at 08:53

## 2021-08-08 RX ADMIN — DOCUSATE SODIUM 100 MG: 100 CAPSULE, LIQUID FILLED ORAL at 20:13

## 2021-08-08 RX ADMIN — SALMETEROL XINAFOATE 1 PUFF: 50 POWDER, METERED ORAL; RESPIRATORY (INHALATION) at 10:41

## 2021-08-08 RX ADMIN — RIVASTIGMINE 1 PATCH: 4.6 PATCH TRANSDERMAL at 10:41

## 2021-08-08 RX ADMIN — MEMANTINE 10 MG: 5 TABLET ORAL at 08:53

## 2021-08-08 RX ADMIN — LEVOTHYROXINE SODIUM 88 MCG: 88 TABLET ORAL at 08:53

## 2021-08-08 RX ADMIN — BENZTROPINE MESYLATE 1 MG: 1 TABLET ORAL at 20:13

## 2021-08-08 RX ADMIN — ATORVASTATIN CALCIUM 80 MG: 80 TABLET, FILM COATED ORAL at 20:13

## 2021-08-08 RX ADMIN — Medication 50 MCG: at 08:53

## 2021-08-08 RX ADMIN — ASPIRIN 81 MG CHEWABLE TABLET 81 MG: 81 TABLET CHEWABLE at 08:53

## 2021-08-08 ASSESSMENT — ACTIVITIES OF DAILY LIVING (ADL)
HYGIENE/GROOMING: PROMPTS
DRESS: SCRUBS (BEHAVIORAL HEALTH)
ORAL_HYGIENE: INDEPENDENT

## 2021-08-08 ASSESSMENT — MIFFLIN-ST. JEOR: SCORE: 1760.67

## 2021-08-08 NOTE — PLAN OF CARE
Plan of care   Problem: Sleep Disturbance  Goal: Adequate Sleep/Rest  Outcome: Improving  Received pt sleeping in bed with regular unlabored respiration; No PRN was requested/administered; No safety concern to report; pt appeared to have slept for 6.5 hrs this shift; will continue to monitor and assess.

## 2021-08-08 NOTE — PLAN OF CARE
"Problem: Psychotic Symptoms  Goal: Psychotic Symptoms  Description: Signs and symptoms of listed problems will be absent or manageable.  Outcome: No Change     John spent time in the TV lounge and in his room tonight. Pt was engaging in self talk consistently throughout shift. At times, his self talk became aggressive, loud, and threatening, but was aimed at no person in particular. He accepted PRN Risperdal, as he was upsetting the milieu . He continued with self talk, but was quieter and slightly more calm. Pt told writer, \"she got the book, but you got my license, you saw her get my head, but I'm going to get the money out!\" At one point, he turned his head fast and looked over his shoulder stating, \"What was that?\" but there was nothing there. Speech is nonsensical with poverty of content. Pt refused COVID test. Compliant with medications. Continue to monitor behavior and offer medications as needed.  "

## 2021-08-08 NOTE — PLAN OF CARE
"  Problem: Psychotic Symptoms  Goal: Psychotic Symptoms  Description: Signs and symptoms of listed problems will be absent or manageable.  8/8/2021 1441 by Radha Donnelly, RN  Outcome: No Change  8/8/2021 1439 by Radha Donnelly, RN  Outcome: Improving       Pt has been visible in the [facility] area at times watching tv.  No interaction with peers.  Pt ate his meals and was med compliant.  Pt was incontinent of urine this morning, linen changed.  Prompted pt to shower pt responded by saying \"later\".  No behavioral concerns.  Will continue to assess.    "

## 2021-08-09 PROCEDURE — 250N000013 HC RX MED GY IP 250 OP 250 PS 637: Performed by: PSYCHIATRY & NEUROLOGY

## 2021-08-09 PROCEDURE — 99232 SBSQ HOSP IP/OBS MODERATE 35: CPT | Performed by: PSYCHIATRY & NEUROLOGY

## 2021-08-09 PROCEDURE — 250N000013 HC RX MED GY IP 250 OP 250 PS 637: Performed by: PHYSICIAN ASSISTANT

## 2021-08-09 PROCEDURE — 124N000002 HC R&B MH UMMC

## 2021-08-09 RX ADMIN — BENZTROPINE MESYLATE 1 MG: 1 TABLET ORAL at 20:00

## 2021-08-09 RX ADMIN — Medication 50 MCG: at 09:27

## 2021-08-09 RX ADMIN — ATORVASTATIN CALCIUM 80 MG: 80 TABLET, FILM COATED ORAL at 20:00

## 2021-08-09 RX ADMIN — GUAIFENESIN 600 MG: 600 TABLET, EXTENDED RELEASE ORAL at 09:25

## 2021-08-09 RX ADMIN — METOPROLOL TARTRATE 25 MG: 25 TABLET, FILM COATED ORAL at 09:25

## 2021-08-09 RX ADMIN — GUAIFENESIN 600 MG: 600 TABLET, EXTENDED RELEASE ORAL at 19:59

## 2021-08-09 RX ADMIN — RIVASTIGMINE 1 PATCH: 4.6 PATCH TRANSDERMAL at 09:31

## 2021-08-09 RX ADMIN — MEMANTINE 10 MG: 5 TABLET ORAL at 20:00

## 2021-08-09 RX ADMIN — METOPROLOL TARTRATE 25 MG: 25 TABLET, FILM COATED ORAL at 20:00

## 2021-08-09 RX ADMIN — SALMETEROL XINAFOATE 1 PUFF: 50 POWDER, METERED ORAL; RESPIRATORY (INHALATION) at 10:18

## 2021-08-09 RX ADMIN — ASPIRIN 81 MG CHEWABLE TABLET 81 MG: 81 TABLET CHEWABLE at 09:24

## 2021-08-09 RX ADMIN — DOCUSATE SODIUM 100 MG: 100 CAPSULE, LIQUID FILLED ORAL at 09:25

## 2021-08-09 RX ADMIN — BENZTROPINE MESYLATE 1 MG: 1 TABLET ORAL at 09:24

## 2021-08-09 RX ADMIN — POLYETHYLENE GLYCOL 3350 17 G: 17 POWDER, FOR SOLUTION ORAL at 09:26

## 2021-08-09 RX ADMIN — SALMETEROL XINAFOATE 1 PUFF: 50 POWDER, METERED ORAL; RESPIRATORY (INHALATION) at 21:17

## 2021-08-09 RX ADMIN — DOCUSATE SODIUM 100 MG: 100 CAPSULE, LIQUID FILLED ORAL at 20:00

## 2021-08-09 RX ADMIN — LEVOTHYROXINE SODIUM 88 MCG: 88 TABLET ORAL at 09:25

## 2021-08-09 RX ADMIN — CLOZAPINE 300 MG: 100 TABLET ORAL at 21:16

## 2021-08-09 RX ADMIN — MEMANTINE 10 MG: 5 TABLET ORAL at 09:25

## 2021-08-09 ASSESSMENT — ACTIVITIES OF DAILY LIVING (ADL)
HYGIENE/GROOMING: SHOWER;PROMPTS
ORAL_HYGIENE: INDEPENDENT
HYGIENE/GROOMING: PROMPTS
DRESS: SCRUBS (BEHAVIORAL HEALTH);INDEPENDENT
DRESS: SCRUBS (BEHAVIORAL HEALTH)
ORAL_HYGIENE: INDEPENDENT

## 2021-08-09 NOTE — PLAN OF CARE
Patient denies depression, anxiety and suicidal thoughts. States has auditory hallucinations which patient states are neutral. Denies visual hallucinations. Went to community meeting but did not attend other groups. Was incontinent of urine on the night shift. Took a shower this morning. Writer asked where patient was and response was [place]. Writer told patient was at [facility] and patient became angry wanting to leave today or tomorrow. Sat in lounge most the shift.

## 2021-08-09 NOTE — PROGRESS NOTES
"  PSYCHIATRIC PROGRESS NOTE    Admission Date: 06/30/2021  Date of Service: 08/09/2021    The patient was seen, his chart reviewed, his case discussed with staff at the Team Meeting. It appears the the patient gives different information about the way he feels to different examiner. He admitted to auditory hallucination to some staff and denied hearing \"voices\" to me. He has been spending more time in the [facility] but has not been attending groups. He had periods of unprovoked agitation. He slept well at night but has been incontinent with urine. He has been medication compliant but refused COVID test.    This is a 55-year-old single white male with a lifelong history of mental illness, TBI, neurocognitive disorder and polysubstance abuse. He has a history of 3 MI commitments and one CD commitment. This time he also was committed and Jarvised. He had spent several months at [facility] and was transferred here after being seen by Dr. Wilks for psychiatric consultation. He was initially seen and followed by Dr. Morfin and has been followed by Dr. Dobbins since 07/08/21. He has been under my care since 07/14/21. His scheduled Risperdal was discontinued and he was started on Clozaril since his Yanez was amended. Subsequently I started him on Namenda and Exelon Patch     MEDICATIONS   Clozaril 300 mg at bedtime   Namenda 10 mg BID   Cogentin 1 mg BID   Exelon Patch 4.6 mg Q24H   Risperdal M-Tab 1 mg Q6H PRN   Melatonin 3 mg HS PRN     LABS: No new results.    VS: T - 97.4, Pulse - 98, BP - 134/88, Resp - 16, SpO2 - 98%    MENTAL STATUS EXAMINATION   Revealed a normally built and normally developed 55-year-old white male appearing older than his stated age. He presented with involuntary tongue movement that may appear as if he was talking to himself. His speech was slurred and difficult to understand. He presented with poverty of speech and paucity of ideas but continued to intermittently " verbalize grandiose delusional material . His affect was blunted. He denied SI and HI. He was alert but oriented to self only. He denied hallucinations.. He had no insight into his problems and his judgement was impaired. He continued to present with marked impairment of his memory and cognition.     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   PLAN: Continue current medications without change. Disposition TBD depending on developing a guardianship.    Juarez Nickerson MD

## 2021-08-09 NOTE — PLAN OF CARE
Problem: Psychotic Symptoms  Goal: Psychotic Symptoms  Description: Signs and symptoms of listed problems will be absent or manageable.  Outcome: No Change  Flowsheets (Taken 8/8/2021 1913)  Psychotic Symptoms Assessed: all  Psychotic Symptoms Present:    anxiety    insight    memory deficits    thought process    affect    mood    speech     John has been in the television lounge all evening watching television. He at times has been talking to himself and appears to be responding to internal stimuli.  He continues to appear unkept and disheveled. Ambulates unassisted by himself. He has been eating well. Sleeping well. No offered complaints of SI/SIB/AH/VH. Flat, blunted affect. Compliant with medications. Nursing to continue to support current plan of care.

## 2021-08-09 NOTE — PLAN OF CARE
"    Assessment/Intervention/Current Symtoms and Care Coordination  -Chart review  -Pre round meeting with team  -Rounded with team, addressed patient needs/concerns  -Post round meeting with team  Current Symptoms include the following:   Incontinent of urine  Responding to internal stimuli At times, his self talk became aggressive, loud, and threatening, but was aimed at no person in particular.  Speech is nonsensical with poverty of content.     Pt was sitting in the lounge alone with the CARE channel on. I checked in with him. He said everything was \"ok.\"    Pt refused COVID test. Pt has been assessed as not  Having the capability to consent to medications.  Therefore the team is not able to give pt the Covid-Vaccine.    Pt is on Clozaril.  Pt has been given the Exelon patch for dementia.      We continue to have an outside resource look for a guardian. She is planning on contacting the [facility] Attorney's Office.  I e-mailed the [employer] CM and the supervisor to see if they have any creative ideas.  I thought about:  [facility] Guardianship Program  Consultation with [facility]  Mental Health Consultation, Shelli DAILEY  Reviewed web sites for [facility] [employer] [facility]  Services and  Health Board but there were no obvious resources for guardianship.      Discharge Plan or Goal  Pending stabilization & development of a safe discharge plan.  Considerations include: locked nursing home    Barriers to Discharge  Patient requires further psychiatric stabilization due to current symptomology    Referral Status  jim for guardian and nursing home placement    Legal Status  Patient is under MI commitment in [facility]            "

## 2021-08-09 NOTE — PLAN OF CARE
Plan of care   Problem: Sleep Disturbance  Goal: Adequate Sleep/Rest  Outcome: Improving  Pt had a good night sleep; No somatic complain; NO PRN was requested or administered this shift; appeared to have slept for 6.5 hrs; will continue to monitor and assess.

## 2021-08-09 NOTE — PLAN OF CARE
"Problem: Psychotic Symptoms  Goal: Psychotic Symptoms  Description: Signs and symptoms of listed problems will be absent or manageable.  Outcome: No Change    John continues to engage in self talk, occasionally raising his voice, but then self regulates. Writer asked who he was talking to, and he states \"someone else, over there\" looking off into the distance at nobody in particular. He is responding to internal stimuli. He endorses hearing voices \"always.\" Appears to have AH/VH at baseline that are not concerning to him. Denies SI/HI. Spent majority of shift watching TV in the TV lounge. He remained behaviorally in control tonight. Gave him depends and encouraged him to use those before he goes to bed d/t ongoing nighttime incontinence. Continue with plan of care.     "

## 2021-08-10 LAB — SARS-COV-2 RNA RESP QL NAA+PROBE: NEGATIVE

## 2021-08-10 PROCEDURE — 250N000013 HC RX MED GY IP 250 OP 250 PS 637: Performed by: PSYCHIATRY & NEUROLOGY

## 2021-08-10 PROCEDURE — 124N000002 HC R&B MH UMMC

## 2021-08-10 PROCEDURE — 250N000013 HC RX MED GY IP 250 OP 250 PS 637: Performed by: PHYSICIAN ASSISTANT

## 2021-08-10 PROCEDURE — 87635 SARS-COV-2 COVID-19 AMP PRB: CPT | Performed by: PSYCHIATRY & NEUROLOGY

## 2021-08-10 RX ADMIN — DOCUSATE SODIUM 100 MG: 100 CAPSULE, LIQUID FILLED ORAL at 08:34

## 2021-08-10 RX ADMIN — SALMETEROL XINAFOATE 1 PUFF: 50 POWDER, METERED ORAL; RESPIRATORY (INHALATION) at 19:40

## 2021-08-10 RX ADMIN — METOPROLOL TARTRATE 25 MG: 25 TABLET, FILM COATED ORAL at 19:38

## 2021-08-10 RX ADMIN — METOPROLOL TARTRATE 25 MG: 25 TABLET, FILM COATED ORAL at 08:34

## 2021-08-10 RX ADMIN — BENZTROPINE MESYLATE 1 MG: 1 TABLET ORAL at 08:33

## 2021-08-10 RX ADMIN — LEVOTHYROXINE SODIUM 88 MCG: 88 TABLET ORAL at 08:33

## 2021-08-10 RX ADMIN — ASPIRIN 81 MG CHEWABLE TABLET 81 MG: 81 TABLET CHEWABLE at 08:34

## 2021-08-10 RX ADMIN — POLYETHYLENE GLYCOL 3350 17 G: 17 POWDER, FOR SOLUTION ORAL at 08:33

## 2021-08-10 RX ADMIN — MEMANTINE 10 MG: 5 TABLET ORAL at 19:36

## 2021-08-10 RX ADMIN — GUAIFENESIN 600 MG: 600 TABLET, EXTENDED RELEASE ORAL at 19:38

## 2021-08-10 RX ADMIN — GUAIFENESIN 600 MG: 600 TABLET, EXTENDED RELEASE ORAL at 08:33

## 2021-08-10 RX ADMIN — ATORVASTATIN CALCIUM 80 MG: 80 TABLET, FILM COATED ORAL at 19:38

## 2021-08-10 RX ADMIN — DOCUSATE SODIUM 100 MG: 100 CAPSULE, LIQUID FILLED ORAL at 19:36

## 2021-08-10 RX ADMIN — MEMANTINE 10 MG: 5 TABLET ORAL at 08:33

## 2021-08-10 RX ADMIN — RIVASTIGMINE 1 PATCH: 4.6 PATCH TRANSDERMAL at 08:33

## 2021-08-10 RX ADMIN — CLOZAPINE 300 MG: 100 TABLET ORAL at 21:03

## 2021-08-10 RX ADMIN — BENZTROPINE MESYLATE 1 MG: 1 TABLET ORAL at 19:36

## 2021-08-10 RX ADMIN — Medication 50 MCG: at 08:33

## 2021-08-10 ASSESSMENT — ACTIVITIES OF DAILY LIVING (ADL)
DRESS: INDEPENDENT
DRESS: INDEPENDENT
LAUNDRY: WITH SUPERVISION
HYGIENE/GROOMING: PROMPTS
HYGIENE/GROOMING: PROMPTS
ORAL_HYGIENE: PROMPTS
ORAL_HYGIENE: PROMPTS
LAUNDRY: WITH SUPERVISION

## 2021-08-10 ASSESSMENT — MIFFLIN-ST. JEOR: SCORE: 1761.58

## 2021-08-10 NOTE — PLAN OF CARE
"Problem: Cognitive Impairment (Psychotic Signs/Symptoms)  Goal: Optimal Cognitive Function (Psychotic Signs/Symptoms)  Outcome: No Change     John spent almost entire shift in TV lounge watching TV. He did allow writer to complete COVID swab. Results negative.     Denies SI/HI. Endorses hearing voices, per baseline. Denies visual hallucinations. Engaged in some self talk this shift, but no yelling observed. During check in, he did get slightly irritable saying that he \"was supposed to be out of here by August 3rd.\" Writer asked where he thinks he was supposed to go, and he reports, \"I got mansions all over the country. I have one in [place] and [place]. I could go anywhere.\" He also mentioned someone named \"Yessenia Lawson\" who he states, \"she's keeping me here and it's bullshit fucking shit.\" He appears delusional but is redirectable. He did approach a staff member asking \"where am I?\" When explained which hospital he was at, he accepted the answer and walked away.     He continues to show sx of TD, with tongue thrusting and lateral tongue movements. These do not appear to bother him. Pt also continues to have NOC incontinence. Writer gave pt Depends and waited outside his room until he put them on. Continue with plan of care.  "

## 2021-08-10 NOTE — PLAN OF CARE
Problem: Behavioral Health Plan of Care  Goal: Plan of Care Review  Recent Flowsheet Documentation  Taken 8/10/2021 0900 by Lynn Singh RN  Plan of Care Reviewed With: patient  Patient Agreement with Plan of Care: refuses to participate           NURSING ASSESSMENT:  Patient aroused after breakfast.  Once patient is up.  He tends to remain in the lounge.  Patient does not attend group.  During group he remains in the lounge watching the care channel.  Patient was not observed responding to internal stimuli this shift.  Patient is incontinent during the noc.  Linen is changed by staff.   Clean clothes are given.  Patient is prompted to shower or provide appropriate skin care.  Patient refused.      MENTAL HEALTH:   Pt refuses to participate in answering questions.  Patient does not demonstrate unsafe behaviors that are at risk for SI, SIB, and HI.      Appetite= Ate 100% of breakfast and lunch. .      Sleep= adequate    MSSA = N/A  COWS= N/A     EDUCATION: New injury prevention r/t skin care d/t incontience.         Will continue with plan of care.      PRNS this shift None.

## 2021-08-10 NOTE — PLAN OF CARE
Assessment/Intervention/Current Symtoms and Care Coordination  -Chart review  -Pre round meeting with team  -Rounded with team, addressed patient needs/concerns  -Post round meeting with team  Current Symptoms include the following: Psychosis    Discharge Plan or Goal  Pending stabilization & development of a safe discharge plan.  Considerations include: locked nursing home    Barriers to Discharge  Patient requires further psychiatric stabilization due to current symptomology    Referral Status  locked nursing home with guardian    Legal Status  Patient is under MI commitment in [facility]

## 2021-08-11 PROCEDURE — 124N000002 HC R&B MH UMMC

## 2021-08-11 PROCEDURE — 99231 SBSQ HOSP IP/OBS SF/LOW 25: CPT | Performed by: PSYCHIATRY & NEUROLOGY

## 2021-08-11 PROCEDURE — 250N000013 HC RX MED GY IP 250 OP 250 PS 637: Performed by: PSYCHIATRY & NEUROLOGY

## 2021-08-11 PROCEDURE — 250N000013 HC RX MED GY IP 250 OP 250 PS 637: Performed by: PHYSICIAN ASSISTANT

## 2021-08-11 RX ADMIN — MEMANTINE 10 MG: 5 TABLET ORAL at 08:28

## 2021-08-11 RX ADMIN — Medication 50 MCG: at 08:28

## 2021-08-11 RX ADMIN — DOCUSATE SODIUM 100 MG: 100 CAPSULE, LIQUID FILLED ORAL at 20:35

## 2021-08-11 RX ADMIN — POLYETHYLENE GLYCOL 3350 17 G: 17 POWDER, FOR SOLUTION ORAL at 08:27

## 2021-08-11 RX ADMIN — ASPIRIN 81 MG CHEWABLE TABLET 81 MG: 81 TABLET CHEWABLE at 08:29

## 2021-08-11 RX ADMIN — GUAIFENESIN 600 MG: 600 TABLET, EXTENDED RELEASE ORAL at 20:35

## 2021-08-11 RX ADMIN — METOPROLOL TARTRATE 25 MG: 25 TABLET, FILM COATED ORAL at 08:28

## 2021-08-11 RX ADMIN — DOCUSATE SODIUM 100 MG: 100 CAPSULE, LIQUID FILLED ORAL at 08:29

## 2021-08-11 RX ADMIN — LEVOTHYROXINE SODIUM 88 MCG: 88 TABLET ORAL at 08:29

## 2021-08-11 RX ADMIN — METOPROLOL TARTRATE 25 MG: 25 TABLET, FILM COATED ORAL at 20:35

## 2021-08-11 RX ADMIN — BENZTROPINE MESYLATE 1 MG: 1 TABLET ORAL at 08:29

## 2021-08-11 RX ADMIN — RIVASTIGMINE 1 PATCH: 4.6 PATCH TRANSDERMAL at 08:28

## 2021-08-11 RX ADMIN — GUAIFENESIN 600 MG: 600 TABLET, EXTENDED RELEASE ORAL at 08:28

## 2021-08-11 RX ADMIN — MEMANTINE 10 MG: 5 TABLET ORAL at 20:35

## 2021-08-11 RX ADMIN — BENZTROPINE MESYLATE 1 MG: 1 TABLET ORAL at 20:35

## 2021-08-11 RX ADMIN — POLYETHYLENE GLYCOL 3350 17 G: 17 POWDER, FOR SOLUTION ORAL at 08:40

## 2021-08-11 RX ADMIN — SALMETEROL XINAFOATE 1 PUFF: 50 POWDER, METERED ORAL; RESPIRATORY (INHALATION) at 20:43

## 2021-08-11 RX ADMIN — ATORVASTATIN CALCIUM 80 MG: 80 TABLET, FILM COATED ORAL at 20:35

## 2021-08-11 RX ADMIN — CLOZAPINE 300 MG: 100 TABLET ORAL at 20:35

## 2021-08-11 ASSESSMENT — ACTIVITIES OF DAILY LIVING (ADL)
ORAL_HYGIENE: PROMPTS
HYGIENE/GROOMING: PROMPTS
LAUNDRY: WITH SUPERVISION
ORAL_HYGIENE: PROMPTS
DRESS: PROMPTS
DRESS: PROMPTS
LAUNDRY: WITH SUPERVISION
HYGIENE/GROOMING: PROMPTS

## 2021-08-11 NOTE — PROGRESS NOTES
John entered the group room for art therapy group and talked about fishing (perhaps remembering a past conversation with writer). He left group after a couple minutes and didn't return.

## 2021-08-11 NOTE — PLAN OF CARE
Patient had a incontinent episode last jennie.  Not only urine but also BM.  He is resistant with cares, and staff have been encouraging  him for a shower.  Became upset once at this staff when giving meds and trying to put on his medication patch. . Appetite good , sleeping on and off through out this shift. Little to say to this staff, or peers. Usually cooperative and medication compliant.

## 2021-08-11 NOTE — PLAN OF CARE
Night Shift Summary (8/10/21 into 08/11/21)    Pt in bed sleeping at start of shift. Breathing quiet and unlabored. Appears to have slept 6.5 hours.    Assault and Elopement precautions in addition to Single Room order; any related events noted above.     Will continue to monitor and assess.       Problem: Behavioral Health Plan of Care  Goal: Absence of New-Onset Illness or Injury  Outcome: Improving   Remains safe on the unit.     Problem: Sleep Disturbance  Goal: Adequate Sleep/Rest  Outcome: Improving   Slept ? 6 hours.

## 2021-08-11 NOTE — PLAN OF CARE
Assessment/Intervention/Current Symtoms and Care Coordination    -Chart review  -Pre round meeting with team  -Rounded with team, addressed patient needs/concerns  -Post round meeting with team  Current Symptoms include the following: disorganization, confusion, and patient is irritable    Pt has been incontinent of urine and stool. Recommendation is for Depends on days and nights.    Discharge Plan or Goal  Pending stabilization & development of a safe discharge plan.  Considerations include:  locked nursing home    Barriers to Discharge  Patient requires further psychiatric stabilization due to current symptomology    Referral Status  Guardian and nursing home    Legal Status  Patient is under MI commitment in [facility]

## 2021-08-12 PROCEDURE — 250N000013 HC RX MED GY IP 250 OP 250 PS 637: Performed by: PHYSICIAN ASSISTANT

## 2021-08-12 PROCEDURE — 250N000013 HC RX MED GY IP 250 OP 250 PS 637: Performed by: PSYCHIATRY & NEUROLOGY

## 2021-08-12 PROCEDURE — 124N000002 HC R&B MH UMMC

## 2021-08-12 PROCEDURE — 99233 SBSQ HOSP IP/OBS HIGH 50: CPT | Performed by: PSYCHIATRY & NEUROLOGY

## 2021-08-12 RX ADMIN — DOCUSATE SODIUM 100 MG: 100 CAPSULE, LIQUID FILLED ORAL at 08:18

## 2021-08-12 RX ADMIN — BENZTROPINE MESYLATE 1 MG: 1 TABLET ORAL at 22:18

## 2021-08-12 RX ADMIN — MEMANTINE 10 MG: 5 TABLET ORAL at 22:17

## 2021-08-12 RX ADMIN — GUAIFENESIN 600 MG: 600 TABLET, EXTENDED RELEASE ORAL at 08:18

## 2021-08-12 RX ADMIN — SALMETEROL XINAFOATE 1 PUFF: 50 POWDER, METERED ORAL; RESPIRATORY (INHALATION) at 22:18

## 2021-08-12 RX ADMIN — LEVOTHYROXINE SODIUM 88 MCG: 88 TABLET ORAL at 08:18

## 2021-08-12 RX ADMIN — MEMANTINE 10 MG: 5 TABLET ORAL at 08:18

## 2021-08-12 RX ADMIN — METOPROLOL TARTRATE 25 MG: 25 TABLET, FILM COATED ORAL at 08:18

## 2021-08-12 RX ADMIN — BENZTROPINE MESYLATE 1 MG: 1 TABLET ORAL at 08:18

## 2021-08-12 RX ADMIN — GUAIFENESIN 600 MG: 600 TABLET, EXTENDED RELEASE ORAL at 22:17

## 2021-08-12 RX ADMIN — DOCUSATE SODIUM 100 MG: 100 CAPSULE, LIQUID FILLED ORAL at 22:17

## 2021-08-12 RX ADMIN — Medication 50 MCG: at 08:17

## 2021-08-12 RX ADMIN — CHOLECALCIFEROL CAP 1.25 MG (50000 UNIT) 1250 MCG: 1.25 CAP at 08:21

## 2021-08-12 RX ADMIN — ASPIRIN 81 MG CHEWABLE TABLET 81 MG: 81 TABLET CHEWABLE at 08:18

## 2021-08-12 RX ADMIN — ATORVASTATIN CALCIUM 80 MG: 80 TABLET, FILM COATED ORAL at 22:17

## 2021-08-12 RX ADMIN — RIVASTIGMINE 1 PATCH: 4.6 PATCH TRANSDERMAL at 08:18

## 2021-08-12 RX ADMIN — Medication 50 MCG: at 08:19

## 2021-08-12 RX ADMIN — CLOZAPINE 300 MG: 100 TABLET ORAL at 22:17

## 2021-08-12 RX ADMIN — SALMETEROL XINAFOATE 1 PUFF: 50 POWDER, METERED ORAL; RESPIRATORY (INHALATION) at 08:20

## 2021-08-12 RX ADMIN — POLYETHYLENE GLYCOL 3350 17 G: 17 POWDER, FOR SOLUTION ORAL at 08:17

## 2021-08-12 ASSESSMENT — ACTIVITIES OF DAILY LIVING (ADL)
DRESS: PROMPTS
ORAL_HYGIENE: PROMPTS
HYGIENE/GROOMING: PROMPTS
DRESS: PROMPTS
HYGIENE/GROOMING: PROMPTS
LAUNDRY: WITH SUPERVISION
LAUNDRY: WITH SUPERVISION
ORAL_HYGIENE: PROMPTS
DRESS: PROMPTS
LAUNDRY: WITH SUPERVISION
ORAL_HYGIENE: PROMPTS
HYGIENE/GROOMING: PROMPTS

## 2021-08-12 ASSESSMENT — MIFFLIN-ST. JEOR: SCORE: 1754.32

## 2021-08-12 NOTE — PROGRESS NOTES
PSYCHIATRIC PROGRESS NOTE    Admission Date: 06/30/2021  Date of Service; 08/11/2021    The patient was seen, his chart reviewed, his case discussed with staff at the Team Meeting.   No significant changes were noted in his behavior. Reportedly he was incontinent with stool and urine last night. He has been spending more time in the lounge but would not initiate any contacts with peers..  He has been medication compliant.    This is a 55-year-old single white male with a lifelong history of mental illness, TBI, neurocognitive disorder and polysubstance abuse. He has a history of 3 MI commitments and one CD commitment. This time he also was committed and Jarvised. He had spent several months at [facility] and was transferred here after being seen by Dr. Wilks for psychiatric consultation. He was initially seen and followed by Dr. Morfin and has been followed by Dr. Dobbins since 07/08/21. He has been under my care since 07/14/21. His scheduled Risperdal was discontinued and he was started on Clozaril since his Yanez was amended. Subsequently I started him on Namenda and Exelon Patch.    MEDICATIONS   Clozaril 300 mg at bedtime   Namenda 10 mg BID   Cogentin 1 mg BID   Exelon Patch 4.6 mg Q24H   Risperdal M-Tab 1 mg Q6H PRN   Melatonin 3 mg HS PRN     LABS: No new result.    VS: T - 98.8, Pulse - 91, BP - 124/88, Resp - 16, SpO2 - 95%    MENTAL STATUS EXAMINATION   Revealed a normally built and normally developed 55-year-old white male appearing older than his stated age. He presented with involuntary tongue movement that may appear as if he was talking to himself. His speech was more coherent and he articulated better.. He presented with paucity of ideas but continued to intermittently verbalize grandiose delusional material . His affect was blunted. He denied SI and HI. He was alert but oriented to self only. He denied hallucinations.. He had no insight into his problems and his  judgement was impaired. He continued to present with marked impairment of his memory and cognition.     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   PLAN: I will discontinue Nicotine Patch. Continue the rest of medications without change. Disposition TBD depending on developing a guardianship.     Juarez Nickerson MD

## 2021-08-12 NOTE — PLAN OF CARE
Assessment/Intervention/Current Symtoms and Care Coordination  -Chart review  -Pre round meeting with team  -Rounded with team, addressed patient needs/concerns  -Post round meeting with team  Current Symptoms include the following: disorganization and confusion    Discharge Plan or Goal  Pending stabilization & development of a safe discharge plan.  Considerations include:  locked nursing home    Barriers to Discharge  Nursing homes will not take the pt without a guardian and no one will accept the pt for guardianship.    Referral Status  Locked nursing home    Legal Status  Patient is under MI commitment in [facility]

## 2021-08-12 NOTE — PLAN OF CARE
Woke patient up today, he was laying in a wet bed. This has been a ongoing situation. He is refusing to put on any kind of pull-up, or depends. Staff continue to bring pull-up to him but then find them on the floor. Staff were able to encourage patient into shower.   He has been medication compliant, and usually cooperative on the unit.  Sleeping ok except waking up with a wet bed. Blunted affect, mood frustrated.

## 2021-08-12 NOTE — PLAN OF CARE
BEHAVIORAL TEAM DISCUSSION    Participants:   Dr. Dobbins, Marylu WEN, Tess Vaca RN      Progress:   This is day 43 of this admission.  Pt has a neurocognitive disorder, is not oriented and is not able to care for himself.    Team would like the pt to have a Covid-19 vaccine.    Anticipated length of stay:   3 months    Continued Stay Criteria/Rationale:   The pt is permanently disabled and not able to car e for self and does not have a structured place to go.      Medical/Physical:   He has been incontinent of urine and stool. He has been refusing to wear depends.  There has been much clean up the staff have to attend to.      Precautions:   Behavioral Orders   Procedures     Assault precautions     Code 2     Elopement precautions     Routine Programming     As clinically indicated     Single Room     Status 15     Every 15 minutes.     Plan:   Look for guardian and nursing home placement.  Medication Management  Clozaril with weekly WBC  Covid-19 vaccine.      Rationale for change in precautions or plan: no changes

## 2021-08-12 NOTE — PLAN OF CARE
Patient has spent majority of the shift in the milieu. Watches television programming, keeps mostly to himself.  Patient denies suicidal ideation and self injurious thoughts. Denies auditory and visual hallucinations. Ate dinner. Med compliant. Observed talking to himself in the milieu.     Patient evaluation continues. Assessed mood,anxiety,thoughts and behavior.     Patient gradually progressing towards goals.    Patient is encouraged to participate in groups and assisted to develop healthy coping skills.     VS reviewed: /87   Pulse 102   Temp 98.8  F (37.1  C) (Oral)   Resp 16   Ht 1.829 m (6')   Wt 89.9 kg (198 lb 1.6 oz)   SpO2 95%   BMI 26.87 kg/m      Length of stay: 42    Refer to daily team meeting notes for individualized plan of care. Nursing will continue to assess.

## 2021-08-12 NOTE — PROGRESS NOTES
"[facility], [place]   Psychiatric Progress Note  Hospital Day: 43        Interim History:   The patient's care was discussed with the treatment team during the daily team meeting and/or staff's chart notes were reviewed.  Staff report patient has been visible in the milieu, irritable, talking to self, taking medications as prescribed, no acute events overnight.     Upon interview, pt was resting in bed, had been observed out in milieu earlier in AM watching TV. Reports mood is \"alright\", tolerating medications, denies side effects.  Inquired about incontinence, he denies feeling over sedated at night but states that it is \"too cold\" at night for him to get out of bed to use restroom. He continues to decline to put briefs on and was strongly encouraged to do this for his own health and hygiene. He denies SI or HI, denies AVH. Denies noting any abnormal movements. He inquired about discharge, again stated he has \"places all over\" and they are in his sisters name but they are his properties including one \"in [place]\". Oriented to self. Denies having any physical health concerns.          Medications:       aspirin  81 mg Oral Daily     atorvastatin  80 mg Oral At Bedtime     benztropine  1 mg Oral BID     cholecalciferol  1,250 mcg Oral Q7 Days     cloZAPine  300 mg Oral At Bedtime     docusate sodium  100 mg Oral BID     guaiFENesin  600 mg Oral BID     levothyroxine  88 mcg Oral Daily     memantine  10 mg Oral BID     metoprolol tartrate  25 mg Oral BID     polyethylene glycol  17 g Oral Daily     rivastigmine  1 patch Transdermal Daily     rivastigmine   Transdermal Q8H     salmeterol  1 puff Inhalation BID     vitamin D3  50 mcg Oral Daily          Allergies:     Allergies   Allergen Reactions     Ibuprofen      Latex           Labs:     Recent Results (from the past 48 hour(s))   SARS-COV2 (COVID-19) Virus RT-PCR    Collection Time: 08/10/21  6:37 PM    Specimen: Nasopharyngeal; " "Swab   Result Value Ref Range    SARS CoV2 PCR Negative Negative          Psychiatric Examination:     /87   Pulse 102   Temp 98.8  F (37.1  C) (Oral)   Resp 16   Ht 1.829 m (6')   Wt 89.9 kg (198 lb 1.6 oz)   SpO2 95%   BMI 26.87 kg/m    Weight is 198 lbs 1.6 oz  Body mass index is 26.87 kg/m .    Orthostatic Vitals       Most Recent      Sitting Orthostatic /86 08/12 0849    Sitting Orthostatic Pulse (bpm) 113 08/12 0849    Standing Orthostatic /78 08/12 0849    Standing Orthostatic Pulse (bpm) 114 08/12 0849        Appearance: awake, alert and disheveled   Attitude: somewhat cooperative  Eye Contact:  fair   Mood:  \"alright\"  Affect:  mood congruent and intensity is flat  Speech:  soft volume, raspy ,dysarthric   Language: fluent and intact in English  Psychomotor, Gait, Musculoskeletal:  no evidence of tardive dyskinesia, dystonia, or tics, less movements of buccolingual dyskinesia present during interview today  Thought Process:  perseverative on discharge otherwise disorganized  Associations:  no loose associations  Thought Content:  no evidence of suicidal ideation or homicidal ideation and delusional/grandiose  Insight:  limited  Judgement:  limited  Oriented to:  person  Attention Span and Concentration:  limited  Recent and Remote Memory:  limited  Fund of Knowledge:  delayed    Clinical Global Impressions  First:  Considering your total clinical experience with this particular patient population, how severe are the patient's symptoms at this time?: 7 (07/01/21 0630)  Compared to the patient's condition at the START of treatment, this patient's condition is: 4 (07/01/21 0630)  Most recent:  Considering your total clinical experience with this particular patient population, how severe are the patient's symptoms at this time?: 7 (08/12/21 1836)  Compared to the patient's condition at the START of treatment, this patient's condition is: 4 (08/12/21 1836)           Precautions: "     Behavioral Orders   Procedures     Assault precautions     Code 2     Elopement precautions     Routine Programming     As clinically indicated     Single Room     Status 15     Every 15 minutes.          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   Stimulant use disorder, in remission.   Transaminitis, possibly medication induced   Hx of CVA  Vit D deficiency  Hypertension  COPD  Hx of infective endocarditis with severe mitral and aortic regurgitation s/p biprostehtic valve replacement 2015  Hx of HFrEF now recovered  Tobacco use disorder  Non-severe malnutrition   Urinary incontinence         Assessment & Plan:   Assessment and hospital summary:  The patient is a 56yo male with a history of schizophrenia and TBI and multiple medical co-morbidities as above who was admitted after being transferred from [facility] medical [facility] after a nearly year-long stay. Is currently under commitment with Yanez recently amended to include Clozaril. While at [facility] was noted to have ongoing agitation and frequently attempting to elope from unit requiring 1:1 staffing for safety. He was started on 1:1 staff upon transfer, this was discontinued as patient has been more cooperative without elopement attempts. IM consult completed on admission and continue to follow due to elevated LFTs. Haldol and VPA discontinued due to LFTs. Cross titration compelted from risperidone to clozaril after Yanez amended. Increasing EPSE/TD movements noted.     Psychiatric treatment/inteventions:  Medications:   -continue Clozaril 300mg at bedtime for psychosis and agitation. Will not adjust dose further at this time, movements consistent with TD noted, and level in therapeutic range   -continue benztropine 1mg BID for EPSE  -continue memantine 5mg daily for  neurocognitive disorder  -continue risperdal 1mg Q6H PRN agitation   -continue lorazepam 0.5-1.0mg Q4H PRN anxiety or severe agitation  -continue rivastigmine patch started 8/2 to target neurocognitive disorder     Laboratory/Imaging: weekly WBC and ANC for clozapine monitoring continues, last ANC 8/6 was 2.9 next ANC 8/13; weekly covid negative 8/10      Patient will be treated in therapeutic milieu with appropriate individual and group therapies as described.     Medical treatment/interventions:  Medical concerns:   1) IM consult placed on admission, per consult note on 7/1/21:   Diagnoses:    #Major neurocognitive disorder dt hx of TBI and alcohol use disorder  #Schizophrenia  #Under commitment  Had been residing in group home prior to admission.  Brought to hospital for evaluation of aggressive behaviors. Group home now unwilling to take him back. Has had numerous CODE 21s called this stay. Seen by psych, meds adjusted. Is currently under civil commitment and court hold through [facility] until 7/31/21. Please see recent discharge summary for details on 6/30/20201.   -Management per psychiatry      #Vitamin D Deficiency- Vit D level 16. Has been in hospital since Sept 2020. Started on cholecalciferol 87418 units on 6/17. Continue high-dose weekly replacement for 6 weeks total. Following high dose replacement recommend starting vitamin D 2000 international unit(s) daily replacement (start date 8/5/2021).      #Possible Tardive Dyskinesia vs EPSE- Per psych assessment on 4/1/21, noted to have possible tardive dyskinesia vs extrapyramidal side effects of meds.  - At that time, recommended to increase Cogentin to 1mg BID and add vitamin E 800 international unit(s) daily.      #Hx of CVA - hx of basal ganglia stroke in 2015. No focal neuro deficits aside from cognitive dysfunction as above.      #Hyperlipidemia - Continue PTA ASA 81 mg daily and atorvastatin 80 mg daily.      #COPD - Not O2 dependent.  Continue PTA on salmeterol and albuterol PRN. Patient occasionally refusing inhalers, encourage compliance. Mucinex added for intermittent cough at [facility].      #Hypothyroidism- TSH 3.18 on 9/2020. Repeat recently on 5/29/2021 slightly elevated 5.18, with no T4 . Continue PTA levothyroxine 88 mcg daily. Repeat TSH with reflex to T4.      #Hx of infective endocarditis with severe mitral and aortic regurgitation S/P bioprosthetic valve replacement (10/2015)  #Hx of HFrEF, now recovered, not in acute exacerbation  Follows with Dr. Dockery of [facility], last seen in 1/2020. Echocardiogram in 5/2016 with EF 50%, no evidence for prosthetic mitral and aortic valve dysfunction.   - Follow-up with outpatient Cardiology upon dismissal from the hospital (on every 2 year follow-ups).  - Continue ASA 81 mg daily      #Tobacco use disorder- Using nicotine patch and PRN gum.     #Non-severe malnutrition- Nutrition consulted and following at OSH.      Ordered CMP, CBC, and TSH with reflex.  No further recommendations at this time. Please page the on-call SHREE for any intercurrent medical issues which arise.      ADDENDUM: TSH normal. CBC normal. ALT elevated at 174, with unsatisfactory AST. Per chart review, LFTs last checked in December with  and . T bili and alk phos.  Reviewed with pharmacy, and possible medications causing would be Haldol, depakote, and statin. Discussed with psych who will taper haldol and Depakote. Holding statin, will check LDL.  Will repeat LFTs in AM, and check CK. Medicine will follow up on repeat LFTs, and CK.      Edith Alfaro PA-C  Hospitalist Service     2) Per updated progress note by IM 7/4:   A/P:  #Transaminitis - slowly rising LFTs since December. Asymptomatic. Reviewed with pharmacy, and possible medications causing would be Haldol, depakote, and statin. Discussed with psych who will taper haldol and Depakote. Holding statin, (total cholesterol 91, with  LDL 41). Repeat LFTs continue to rise slightly.   -Abdominal US tomorrow (NPO after midnight)  -Hepatitis B surface ab and agn and Hep C ab   -Repeat LFTs in 3 days     Medicine will follow up on Abdominal US, viral hepatitis studies and repeat LFTs.  No further recommendations at this time. Please page the on-call SHREE for any intercurrent medical issues which arise.      Edith Alfaro PA-C  Hospitalist Service     Per progress note 7/12:      Brief Medicine Note     Medicine following peripherally for LFT monitoring.  LFTs today improved:  (182) and ALT 92 (129). T bili and ALP WNL.      Today's vital signs, medications, and nursing notes were reviewed.       /77 (BP Location: Left arm)   Pulse 110   Temp 97.8  F (36.6  C) (Oral)   Resp 16   Ht 1.829 m (6')   Wt 87.5 kg (192 lb 14.4 oz)   SpO2 95%   BMI 26.16 kg/m       Continue current plan of care. Will continue to trend CMP In 3 days to ensure continued downtrend.      Kim Harman PA-C  Hospitalist Service        3) Urinary incontinence and stool incontinence: RN staff first reported urinary incontinence over weekend 7/9, per chart review patient has history of intermittent urinary incontinence going back to at least 2016, continues to have intermittent episodes of incontinence, continues to have both urinary and stool incontinence, placed IM consult for further assessment of need for additional work up given pts limitations as historian, appreciate assistance, no abnormalities noted on evaluation including bladder scan. Patient declining to wear incontinence briefs, staff continuing to encourage.      This note was created by undersigned using a Dragon dictation system. All typing errors or contextual distortion are unintentional and software inherent.     Disposition Plan   Reason for ongoing admission: is unable to care for self due to severe psychosis or mariusz and neurocognitive disorder  Discharge location: TBD, likely locked  [facility] facility , unable to progress on discharge planning due to guardianship not being in place, see CTC notes  Discharge Medications: not ordered  Follow-up Appointments: not scheduled  Legal Status: Full MI commitment and Yanez     Entered by: Jeanette Dobbins on 8/12/2021 at 7:40 AM

## 2021-08-13 LAB
BASOPHILS # BLD AUTO: 0.1 10E3/UL (ref 0–0.2)
BASOPHILS NFR BLD AUTO: 1 %
EOSINOPHIL # BLD AUTO: 1.1 10E3/UL (ref 0–0.7)
EOSINOPHIL NFR BLD AUTO: 17 %
ERYTHROCYTE [DISTWIDTH] IN BLOOD BY AUTOMATED COUNT: 13.3 % (ref 10–15)
HCT VFR BLD AUTO: 45.2 % (ref 40–53)
HGB BLD-MCNC: 15.5 G/DL (ref 13.3–17.7)
IMM GRANULOCYTES # BLD: 0 10E3/UL
IMM GRANULOCYTES NFR BLD: 0 %
LYMPHOCYTES # BLD AUTO: 1.6 10E3/UL (ref 0.8–5.3)
LYMPHOCYTES NFR BLD AUTO: 24 %
MCH RBC QN AUTO: 32 PG (ref 26.5–33)
MCHC RBC AUTO-ENTMCNC: 34.3 G/DL (ref 31.5–36.5)
MCV RBC AUTO: 93 FL (ref 78–100)
MONOCYTES # BLD AUTO: 0.5 10E3/UL (ref 0–1.3)
MONOCYTES NFR BLD AUTO: 8 %
NEUTROPHILS # BLD AUTO: 3.2 10E3/UL (ref 1.6–8.3)
NEUTROPHILS NFR BLD AUTO: 50 %
NRBC # BLD AUTO: 0 10E3/UL
NRBC BLD AUTO-RTO: 0 /100
PLATELET # BLD AUTO: 208 10E3/UL (ref 150–450)
RBC # BLD AUTO: 4.84 10E6/UL (ref 4.4–5.9)
WBC # BLD AUTO: 6.5 10E3/UL (ref 4–11)

## 2021-08-13 PROCEDURE — 250N000013 HC RX MED GY IP 250 OP 250 PS 637: Performed by: PSYCHIATRY & NEUROLOGY

## 2021-08-13 PROCEDURE — 90853 GROUP PSYCHOTHERAPY: CPT

## 2021-08-13 PROCEDURE — 85025 COMPLETE CBC W/AUTO DIFF WBC: CPT | Performed by: PSYCHIATRY & NEUROLOGY

## 2021-08-13 PROCEDURE — 124N000002 HC R&B MH UMMC

## 2021-08-13 PROCEDURE — 99233 SBSQ HOSP IP/OBS HIGH 50: CPT | Performed by: PSYCHIATRY & NEUROLOGY

## 2021-08-13 PROCEDURE — 250N000013 HC RX MED GY IP 250 OP 250 PS 637: Performed by: PHYSICIAN ASSISTANT

## 2021-08-13 PROCEDURE — 36415 COLL VENOUS BLD VENIPUNCTURE: CPT | Performed by: PSYCHIATRY & NEUROLOGY

## 2021-08-13 RX ADMIN — ATORVASTATIN CALCIUM 80 MG: 80 TABLET, FILM COATED ORAL at 21:21

## 2021-08-13 RX ADMIN — RIVASTIGMINE 1 PATCH: 4.6 PATCH TRANSDERMAL at 08:28

## 2021-08-13 RX ADMIN — ASPIRIN 81 MG CHEWABLE TABLET 81 MG: 81 TABLET CHEWABLE at 08:28

## 2021-08-13 RX ADMIN — Medication 50 MCG: at 08:29

## 2021-08-13 RX ADMIN — MEMANTINE 10 MG: 5 TABLET ORAL at 08:29

## 2021-08-13 RX ADMIN — SALMETEROL XINAFOATE 1 PUFF: 50 POWDER, METERED ORAL; RESPIRATORY (INHALATION) at 21:22

## 2021-08-13 RX ADMIN — GUAIFENESIN 600 MG: 600 TABLET, EXTENDED RELEASE ORAL at 21:21

## 2021-08-13 RX ADMIN — DOCUSATE SODIUM 100 MG: 100 CAPSULE, LIQUID FILLED ORAL at 21:21

## 2021-08-13 RX ADMIN — BENZTROPINE MESYLATE 1 MG: 1 TABLET ORAL at 08:29

## 2021-08-13 RX ADMIN — CLOZAPINE 300 MG: 100 TABLET ORAL at 21:22

## 2021-08-13 RX ADMIN — DOCUSATE SODIUM 100 MG: 100 CAPSULE, LIQUID FILLED ORAL at 08:29

## 2021-08-13 RX ADMIN — SALMETEROL XINAFOATE 1 PUFF: 50 POWDER, METERED ORAL; RESPIRATORY (INHALATION) at 08:29

## 2021-08-13 RX ADMIN — METOPROLOL TARTRATE 25 MG: 25 TABLET, FILM COATED ORAL at 21:21

## 2021-08-13 RX ADMIN — MEMANTINE 10 MG: 5 TABLET ORAL at 21:21

## 2021-08-13 RX ADMIN — METOPROLOL TARTRATE 25 MG: 25 TABLET, FILM COATED ORAL at 08:29

## 2021-08-13 RX ADMIN — GUAIFENESIN 600 MG: 600 TABLET, EXTENDED RELEASE ORAL at 08:28

## 2021-08-13 RX ADMIN — LEVOTHYROXINE SODIUM 88 MCG: 88 TABLET ORAL at 08:29

## 2021-08-13 RX ADMIN — POLYETHYLENE GLYCOL 3350 17 G: 17 POWDER, FOR SOLUTION ORAL at 08:28

## 2021-08-13 RX ADMIN — BENZTROPINE MESYLATE 1 MG: 1 TABLET ORAL at 21:21

## 2021-08-13 ASSESSMENT — ACTIVITIES OF DAILY LIVING (ADL)
HYGIENE/GROOMING: PROMPTS
DRESS: INDEPENDENT
ORAL_HYGIENE: PROMPTS
ORAL_HYGIENE: INDEPENDENT;PROMPTS
LAUNDRY: WITH SUPERVISION
LAUNDRY: WITH SUPERVISION
HYGIENE/GROOMING: INDEPENDENT;PROMPTS
DRESS: PROMPTS

## 2021-08-13 NOTE — PLAN OF CARE
Night Shift Summary (8/12/21 into 08/13/21)    Pt in bed sleeping at start of shift. Breathing quiet and unlabored. Staff heard pt's toilet flush around 0145. Room smelled like pt had a bowel movement. Pt's room noted to be clean, pt was appropriately dressed with pull-up on (per pt report), and pt did not have any BM on his hands. Staff notified pt that they were placing an extra set of pull-up and scrubs on his shelf, and to let staff know if he needs any help during the night. Pt demonstrated that he understood staff and returned to sleep shortly after. Appears to have slept 6.75 hours.    Assault and Elopement precautions in addition to Single Room order; any related events noted above.     Will continue to monitor and assess.       Problem: Behavioral Health Plan of Care  Goal: Absence of New-Onset Illness or Injury  Outcome: Improving   Remains safe on the unit.     Problem: Sleep Disturbance  Goal: Adequate Sleep/Rest  Outcome: Improving   Slept ? 6 hours.

## 2021-08-13 NOTE — PLAN OF CARE
BEHAVIORAL TEAM DISCUSSION    Participants: Dr. Dobbins; Lynn Mobley RN; Maile CALIX; Barbara CALIX.  Progress: some improvements noted.  Anticipated length of stay: Unknown pending location of placement  Continued Stay Criteria/Rationale: Patient is unable to care for himself.  Medical/Physical: multiple medical issues that are being monitored by medicine team.  Precautions:   Behavioral Orders   Procedures    Assault precautions    Code 2    Elopement precautions    Routine Programming     As clinically indicated    Single Room    Status 15     Every 15 minutes.     Plan: Transfer to appropriate placement when placement is located.  Rationale for change in precautions or plan: no change
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
## 2021-08-13 NOTE — PLAN OF CARE
Assessment/Intervention/Current Symptoms and Care Coordination  Patient has been observed in the lounge, is medication compliant.  Did not attend group today.      Discharge Plan or Goal  Patient will need a locked facility and a guardian.      Barriers to Discharge   Patient is unable to care for himself.    Referral Status  Search for new placement ongoing.     Legal Status  Commitment and [facility]

## 2021-08-13 NOTE — PLAN OF CARE
Problem: Behavioral Health Plan of Care  Goal: Plan of Care Review  Recent Flowsheet Documentation  Taken 8/13/2021 1100 by Lynn Singh RN  Plan of Care Reviewed With: patient  Patient Agreement with Plan of Care: agrees     Patient is woke up for breakfast.  He is absent of incontinence this morning.  Accepted his morning meds.  Patient has a 24 hour patch that needs to be removed before application of a new patch.  Patient is asked to sit up and take his sweatshirt off.  Patient cursed as he complied.  He ate breakfast in the lounge with peers.  Did not attend group.  He enjoys watching TV.  Continue with plan of care.

## 2021-08-13 NOTE — PLAN OF CARE
Patient has spent majority of the shift in the milieu. Patient denies suicidal ideation and self injurious thoughts. Denies anxiety and depression. Denies auditory and visual hallucinations. Ate dinner. Med compliant. Continues to be observed talking to himself.    Patient evaluation continues. Assessed mood,anxiety,thoughts and behavior.     Patient gradually progressing towards goals.    Patient is encouraged to participate in groups and assisted to develop healthy coping skills.     VS reviewed: BP 95/67   Pulse 88   Temp 97.9  F (36.6  C) (Oral)   Resp 17   Ht 1.829 m (6')   Wt 89.1 kg (196 lb 8 oz)   SpO2 97%   BMI 26.65 kg/m      Length of stay: 43    Refer to daily team meeting notes for individualized plan of care. Nursing will continue to assess.

## 2021-08-14 PROCEDURE — 124N000002 HC R&B MH UMMC

## 2021-08-14 PROCEDURE — 250N000013 HC RX MED GY IP 250 OP 250 PS 637: Performed by: PHYSICIAN ASSISTANT

## 2021-08-14 PROCEDURE — 250N000013 HC RX MED GY IP 250 OP 250 PS 637: Performed by: PSYCHIATRY & NEUROLOGY

## 2021-08-14 RX ADMIN — Medication 50 MCG: at 08:02

## 2021-08-14 RX ADMIN — DOCUSATE SODIUM 100 MG: 100 CAPSULE, LIQUID FILLED ORAL at 21:27

## 2021-08-14 RX ADMIN — BENZTROPINE MESYLATE 1 MG: 1 TABLET ORAL at 08:01

## 2021-08-14 RX ADMIN — POLYETHYLENE GLYCOL 3350 17 G: 17 POWDER, FOR SOLUTION ORAL at 08:02

## 2021-08-14 RX ADMIN — SALMETEROL XINAFOATE 1 PUFF: 50 POWDER, METERED ORAL; RESPIRATORY (INHALATION) at 08:03

## 2021-08-14 RX ADMIN — METOPROLOL TARTRATE 25 MG: 25 TABLET, FILM COATED ORAL at 21:28

## 2021-08-14 RX ADMIN — LEVOTHYROXINE SODIUM 88 MCG: 88 TABLET ORAL at 08:01

## 2021-08-14 RX ADMIN — CLOZAPINE 300 MG: 100 TABLET ORAL at 21:27

## 2021-08-14 RX ADMIN — GUAIFENESIN 600 MG: 600 TABLET, EXTENDED RELEASE ORAL at 21:28

## 2021-08-14 RX ADMIN — MEMANTINE 10 MG: 5 TABLET ORAL at 21:27

## 2021-08-14 RX ADMIN — ASPIRIN 81 MG CHEWABLE TABLET 81 MG: 81 TABLET CHEWABLE at 08:02

## 2021-08-14 RX ADMIN — RIVASTIGMINE 1 PATCH: 4.6 PATCH TRANSDERMAL at 08:01

## 2021-08-14 RX ADMIN — DOCUSATE SODIUM 100 MG: 100 CAPSULE, LIQUID FILLED ORAL at 08:01

## 2021-08-14 RX ADMIN — SALMETEROL XINAFOATE 1 PUFF: 50 POWDER, METERED ORAL; RESPIRATORY (INHALATION) at 21:29

## 2021-08-14 RX ADMIN — GUAIFENESIN 600 MG: 600 TABLET, EXTENDED RELEASE ORAL at 08:01

## 2021-08-14 RX ADMIN — MEMANTINE 10 MG: 5 TABLET ORAL at 08:02

## 2021-08-14 RX ADMIN — BENZTROPINE MESYLATE 1 MG: 1 TABLET ORAL at 21:28

## 2021-08-14 RX ADMIN — ATORVASTATIN CALCIUM 80 MG: 80 TABLET, FILM COATED ORAL at 21:27

## 2021-08-14 RX ADMIN — METOPROLOL TARTRATE 25 MG: 25 TABLET, FILM COATED ORAL at 08:02

## 2021-08-14 ASSESSMENT — ACTIVITIES OF DAILY LIVING (ADL)
DRESS: SCRUBS (BEHAVIORAL HEALTH)
ORAL_HYGIENE: INDEPENDENT;PROMPTS
HYGIENE/GROOMING: PROMPTS
LAUNDRY: WITH SUPERVISION
HYGIENE/GROOMING: INDEPENDENT
ORAL_HYGIENE: INDEPENDENT
DRESS: INDEPENDENT
LAUNDRY: WITH SUPERVISION

## 2021-08-14 NOTE — PLAN OF CARE
Patient was wet again this am. ' I need some dry clothes'  He was medication compliant  only starts crabbing  at this staff when trying to change his medication patch.  He calms down right after patch in place.  Appetite good, mood calm, blunted affect.  He is usually in his room sleeping on and off through out the day.

## 2021-08-14 NOTE — PLAN OF CARE
Patient has spent majority of the shift in the milieu. Patient denies suicidal ideation and self injurious thoughts. Denies auditory and visual hallucinations. Denies anxiety and depression. Patient continues to be distracted and occasionally talks to himself. Med compliant.     Patient evaluation continues. Assessed mood,anxiety,thoughts and behavior.     Patient gradually progressing towards goals.    Patient is encouraged to participate in groups and assisted to develop healthy coping skills.     VS reviewed: /86   Pulse 96   Temp 97.4  F (36.3  C) (Oral)   Resp 16   Ht 1.829 m (6')   Wt 89.1 kg (196 lb 8 oz)   SpO2 98%   BMI 26.65 kg/m      Length of stay: 44    Refer to daily team meeting notes for individualized plan of care. Nursing will continue to assess.
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
## 2021-08-14 NOTE — PLAN OF CARE
Problem: Behavioral Health Plan of Care  Goal: Plan of Care Review  Outcome: No Change   Night Shift Summary (8/13/21 into 08/14/21)    Pt asleep at start of shift. Breathing quiet and unlabored. Pt awoke at 0600, went to the bathroom and then returned to bed.    Appears to have slept 6.75 hours.

## 2021-08-15 PROCEDURE — 250N000013 HC RX MED GY IP 250 OP 250 PS 637: Performed by: PSYCHIATRY & NEUROLOGY

## 2021-08-15 PROCEDURE — 124N000002 HC R&B MH UMMC

## 2021-08-15 PROCEDURE — 250N000013 HC RX MED GY IP 250 OP 250 PS 637: Performed by: PHYSICIAN ASSISTANT

## 2021-08-15 RX ADMIN — METOPROLOL TARTRATE 25 MG: 25 TABLET, FILM COATED ORAL at 21:38

## 2021-08-15 RX ADMIN — MEMANTINE 10 MG: 5 TABLET ORAL at 21:38

## 2021-08-15 RX ADMIN — POLYETHYLENE GLYCOL 3350 17 G: 17 POWDER, FOR SOLUTION ORAL at 08:30

## 2021-08-15 RX ADMIN — CLOZAPINE 300 MG: 100 TABLET ORAL at 21:37

## 2021-08-15 RX ADMIN — SALMETEROL XINAFOATE 1 PUFF: 50 POWDER, METERED ORAL; RESPIRATORY (INHALATION) at 21:40

## 2021-08-15 RX ADMIN — METOPROLOL TARTRATE 25 MG: 25 TABLET, FILM COATED ORAL at 08:32

## 2021-08-15 RX ADMIN — Medication 50 MCG: at 08:31

## 2021-08-15 RX ADMIN — BENZTROPINE MESYLATE 1 MG: 1 TABLET ORAL at 21:38

## 2021-08-15 RX ADMIN — GUAIFENESIN 600 MG: 600 TABLET, EXTENDED RELEASE ORAL at 21:38

## 2021-08-15 RX ADMIN — GUAIFENESIN 600 MG: 600 TABLET, EXTENDED RELEASE ORAL at 08:31

## 2021-08-15 RX ADMIN — RIVASTIGMINE 1 PATCH: 4.6 PATCH TRANSDERMAL at 08:31

## 2021-08-15 RX ADMIN — DOCUSATE SODIUM 100 MG: 100 CAPSULE, LIQUID FILLED ORAL at 08:33

## 2021-08-15 RX ADMIN — DOCUSATE SODIUM 100 MG: 100 CAPSULE, LIQUID FILLED ORAL at 21:38

## 2021-08-15 RX ADMIN — LEVOTHYROXINE SODIUM 88 MCG: 88 TABLET ORAL at 08:33

## 2021-08-15 RX ADMIN — ASPIRIN 81 MG CHEWABLE TABLET 81 MG: 81 TABLET CHEWABLE at 08:35

## 2021-08-15 RX ADMIN — BENZTROPINE MESYLATE 1 MG: 1 TABLET ORAL at 08:31

## 2021-08-15 RX ADMIN — MEMANTINE 10 MG: 5 TABLET ORAL at 08:31

## 2021-08-15 RX ADMIN — ATORVASTATIN CALCIUM 80 MG: 80 TABLET, FILM COATED ORAL at 21:37

## 2021-08-15 ASSESSMENT — ACTIVITIES OF DAILY LIVING (ADL)
HYGIENE/GROOMING: PROMPTS;INDEPENDENT
HYGIENE/GROOMING: INDEPENDENT
LAUNDRY: WITH SUPERVISION
LAUNDRY: WITH SUPERVISION
ORAL_HYGIENE: PROMPTS
DRESS: INDEPENDENT
ORAL_HYGIENE: INDEPENDENT
DRESS: SCRUBS (BEHAVIORAL HEALTH)

## 2021-08-15 NOTE — PLAN OF CARE
Problem: Behavioral Health Plan of Care  Goal: Plan of Care Review  Outcome: No Change   Night Shift Summary (8/14/21 into 08/15/21)    Pt asleep at start of shift. Breathing quiet and unlabored.     Appears to have slept 7 hours.

## 2021-08-15 NOTE — PLAN OF CARE
Patient has spent majority of the shift in the milieu. Patient denies suicidal ideation and self injurious thoughts. Denies anxiety and depression. Denies auditory and visual hallucinations. Ate dinner. Med compliant. Affect is flat and irritable at times. Continues to be distracted and talks to himself.     Patient evaluation continues. Assessed mood,anxiety,thoughts and behavior.     Patient gradually progressing towards goals.    Patient is encouraged to participate in groups and assisted to develop healthy coping skills.     VS reviewed: /84   Pulse 99   Temp 97.9  F (36.6  C) (Oral)   Resp 16   Ht 1.829 m (6')   Wt 89.1 kg (196 lb 8 oz)   SpO2 98%   BMI 26.65 kg/m      Length of stay: 45    Refer to daily team meeting notes for individualized plan of care. Nursing will continue to assess.

## 2021-08-15 NOTE — PLAN OF CARE
Problem: Behavioral Health Plan of Care  Goal: Plan of Care Review  Recent Flowsheet Documentation  Taken 8/15/2021 1100 by Lynn Singh RN  Plan of Care Reviewed With: patient  Patient Agreement with Plan of Care: agrees           NURSING ASSESSMENT: Patient is assessed for suicidal risk and mental health symptoms. Patient had an uneventful day. Slept in till the end of breakfast.  Compliant with his medication.  Did not yell or curse during the exchange of patches.  Patient new patch is located on the patient left upper backs.  This should decreased patient disturbances.  Patient ate breakfast and lunch.  Patient is encouraged to shower.  He denied, he responded to the request, that he will shower in the am.  He was given a new pair of scrubs.         MENTAL HEALTH:   Pt denies SI, SIB, HI, and hallucinations. Patient is observed having a conversation by his self.  When asked he denies.     Appetite= appropriate.    Sleep= Adequate.     MSSA =   NACOWS=NA     EDUCATION:        Will continue with plan of care.      PRNS this shift

## 2021-08-16 PROCEDURE — 124N000002 HC R&B MH UMMC

## 2021-08-16 PROCEDURE — 250N000013 HC RX MED GY IP 250 OP 250 PS 637: Performed by: PSYCHIATRY & NEUROLOGY

## 2021-08-16 PROCEDURE — 250N000013 HC RX MED GY IP 250 OP 250 PS 637: Performed by: PHYSICIAN ASSISTANT

## 2021-08-16 PROCEDURE — 99233 SBSQ HOSP IP/OBS HIGH 50: CPT | Performed by: PSYCHIATRY & NEUROLOGY

## 2021-08-16 RX ADMIN — METOPROLOL TARTRATE 25 MG: 25 TABLET, FILM COATED ORAL at 08:47

## 2021-08-16 RX ADMIN — GUAIFENESIN 600 MG: 600 TABLET, EXTENDED RELEASE ORAL at 21:18

## 2021-08-16 RX ADMIN — ASPIRIN 81 MG CHEWABLE TABLET 81 MG: 81 TABLET CHEWABLE at 08:47

## 2021-08-16 RX ADMIN — POLYETHYLENE GLYCOL 3350 17 G: 17 POWDER, FOR SOLUTION ORAL at 08:57

## 2021-08-16 RX ADMIN — LEVOTHYROXINE SODIUM 88 MCG: 88 TABLET ORAL at 08:48

## 2021-08-16 RX ADMIN — CLOZAPINE 300 MG: 100 TABLET ORAL at 21:17

## 2021-08-16 RX ADMIN — GUAIFENESIN 600 MG: 600 TABLET, EXTENDED RELEASE ORAL at 08:47

## 2021-08-16 RX ADMIN — BENZTROPINE MESYLATE 1 MG: 1 TABLET ORAL at 21:17

## 2021-08-16 RX ADMIN — RIVASTIGMINE 1 PATCH: 4.6 PATCH TRANSDERMAL at 08:48

## 2021-08-16 RX ADMIN — METOPROLOL TARTRATE 25 MG: 25 TABLET, FILM COATED ORAL at 21:18

## 2021-08-16 RX ADMIN — ATORVASTATIN CALCIUM 80 MG: 80 TABLET, FILM COATED ORAL at 21:18

## 2021-08-16 RX ADMIN — BENZTROPINE MESYLATE 1 MG: 1 TABLET ORAL at 08:47

## 2021-08-16 RX ADMIN — MEMANTINE 10 MG: 5 TABLET ORAL at 08:48

## 2021-08-16 RX ADMIN — Medication 50 MCG: at 08:47

## 2021-08-16 RX ADMIN — DOCUSATE SODIUM 100 MG: 100 CAPSULE, LIQUID FILLED ORAL at 21:17

## 2021-08-16 RX ADMIN — SALMETEROL XINAFOATE 1 PUFF: 50 POWDER, METERED ORAL; RESPIRATORY (INHALATION) at 21:18

## 2021-08-16 RX ADMIN — MEMANTINE 10 MG: 5 TABLET ORAL at 21:17

## 2021-08-16 RX ADMIN — DOCUSATE SODIUM 100 MG: 100 CAPSULE, LIQUID FILLED ORAL at 08:48

## 2021-08-16 NOTE — PLAN OF CARE
Problem: Psychotic Symptoms  Goal: Psychotic Symptoms  Description: Signs and symptoms of listed problems will be absent or manageable.  Outcome: No Change       Pt has been visible in the unit.  Pt denies auditory and visual hallucinations although appears responding.  Pt observed talking to himself.  Pt is oriented to self only.  Pt has been calm and co-operative on the unit.  Good appetite.  No concerns.  Will continue to assess.

## 2021-08-16 NOTE — PLAN OF CARE
Patient has spent majority of the shift in the milieu. Patient denies suicidal ideation and self injurious thoughts. Denies anxiety and depression. Denies auditory and visual hallucinations. Continues to appear to be responding to internal stimuli, talks to himself under his breathe. Ate dinner and med compliant.     Patient evaluation continues. Assessed mood,anxiety,thoughts and behavior.     Patient gradually progressing towards goals.    Patient is encouraged to participate in groups and assisted to develop healthy coping skills.     VS reviewed: BP (!) 136/97   Pulse 101   Temp 97.6  F (36.4  C) (Oral)   Resp 16   Ht 1.829 m (6')   Wt 89.1 kg (196 lb 8 oz)   SpO2 96%   BMI 26.65 kg/m      Length of stay: 46    Refer to daily team meeting notes for individualized plan of care. Nursing will continue to assess.

## 2021-08-16 NOTE — PLAN OF CARE
Assessment/Intervention/Current Symtoms and Care Coordination  Attended team meeting  Reviewed chart notes.  Patient observed in lounge watching TV. Nurses report pt is med compliant, not oriented to place.      Discharge Plan or Goal  Patient will need a locked facility and a guardian.      Barriers to Discharge   Patient unable to care for himself.     Referral Status  Search for new placement is on-going.      Legal Status  Commitment/Yanez (pursuing guardianship)

## 2021-08-16 NOTE — PROGRESS NOTES
"[facility], Mobile   Psychiatric Progress Note  Hospital Day: 47        Interim History:   The patient's care was discussed with the treatment team during the daily team meeting and/or staff's chart notes were reviewed.  Staff report patient was visible in the milieu, taking medications as prescribed, appears confused and responding to internal stimuli, continues to have episodes of incontinence, no acute events overnight.     Upon interview, pt was in his room sleeping, woke up to voice. Reports mood is \"tired\", he denies SI or HI, denies AVH. Mentioned staff have observed him talking to self at times, he mumbled something writer could not hear and then stated \"fuck, I did not.\" He was oriented to self, stated \"[facility]\" when asked about where he was. He reports feeling well, denies any pain or other physical health concerns. Inquired as to whether patient was wearing incontinence briefs, he again stated he was \"tired\" and declining to wear them. Requests to discharge and reminded team working on finding placement. No additional concerns.          Medications:       aspirin  81 mg Oral Daily     atorvastatin  80 mg Oral At Bedtime     benztropine  1 mg Oral BID     cholecalciferol  1,250 mcg Oral Q7 Days     cloZAPine  300 mg Oral At Bedtime     docusate sodium  100 mg Oral BID     guaiFENesin  600 mg Oral BID     levothyroxine  88 mcg Oral Daily     memantine  10 mg Oral BID     metoprolol tartrate  25 mg Oral BID     polyethylene glycol  17 g Oral Daily     rivastigmine  1 patch Transdermal Daily     rivastigmine   Transdermal Q8H     salmeterol  1 puff Inhalation BID     vitamin D3  50 mcg Oral Daily          Allergies:     Allergies   Allergen Reactions     Ibuprofen      Latex           Labs:     No results found for this or any previous visit (from the past 48 hour(s)).       Psychiatric Examination:     BP (!) 136/97   Pulse 101   Temp 97.6  F (36.4  C) (Oral)   " "Resp 16   Ht 1.829 m (6')   Wt 89.1 kg (196 lb 8 oz)   SpO2 96%   BMI 26.65 kg/m    Weight is 196 lbs 8 oz  Body mass index is 26.65 kg/m .    Orthostatic Vitals     None        Appearance: awake, alert and disheveled   Attitude: somewhat cooperative  Eye Contact:  fair   Mood:  \"tired\"  Affect:  mood congruent and intensity is flat  Speech:  soft volume, raspy ,dysarthric   Language: fluent and intact in English  Psychomotor, Gait, Musculoskeletal:  no evidence of tardive dyskinesia, dystonia, or tics, minimal movements of buccolingual dyskinesia present during interview today  Thought Process:  perseverative on discharge otherwise disorganized  Associations:  no loose associations  Thought Content:  no evidence of suicidal ideation or homicidal ideation and delusional/grandiose  Insight:  limited  Judgement:  limited  Oriented to:  person  Attention Span and Concentration:  limited  Recent and Remote Memory:  limited  Fund of Knowledge:  delayed    Clinical Global Impressions  First:  Considering your total clinical experience with this particular patient population, how severe are the patient's symptoms at this time?: 7 (07/01/21 0630)  Compared to the patient's condition at the START of treatment, this patient's condition is: 4 (07/01/21 0630)  Most recent:  Considering your total clinical experience with this particular patient population, how severe are the patient's symptoms at this time?: 7 (08/12/21 1836)  Compared to the patient's condition at the START of treatment, this patient's condition is: 4 (08/12/21 1836)           Precautions:     Behavioral Orders   Procedures     Assault precautions     Code 2     Elopement precautions     Routine Programming     As clinically indicated     Single Room     Status 15     Every 15 minutes.          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   Stimulant use disorder, in remission.   Transaminitis, possibly medication induced   Hx of CVA  Vit D deficiency  Hypertension  COPD  Hx of infective endocarditis with severe mitral and aortic regurgitation s/p biprostehtic valve replacement 2015  Hx of HFrEF now recovered  Tobacco use disorder  Non-severe malnutrition   Urinary incontinence         Assessment & Plan:   Assessment and hospital summary:  The patient is a 54yo male with a history of schizophrenia and TBI and multiple medical co-morbidities as above who was admitted after being transferred from [facility] medical [facility] after a nearly year-long stay. Is currently under commitment with [employer] recently amended to include Clozaril. While at [facility] was noted to have ongoing agitation and frequently attempting to elope from unit requiring 1:1 staffing for safety. He was started on 1:1 staff upon transfer, this was discontinued as patient has been more cooperative without elopement attempts. IM consult completed on admission and continued to follow due to elevated LFTs. Haldol and VPA discontinued due to LFTs. Cross titration completed from risperidone to clozaril after Yanez amended. EPSE/TD movements noted, have appeared stable over past few weeks.     Psychiatric treatment/inteventions:  Medications:   -continue Clozaril 300mg at bedtime for psychosis and agitation. Will not adjust dose further at this time, movements consistent with TD noted, and level in therapeutic range   -continue benztropine 1mg BID for EPSE  -continue memantine 5mg daily for neurocognitive disorder  -continue risperdal 1mg Q6H PRN agitation   -continue lorazepam 0.5-1.0mg Q4H PRN anxiety or severe agitation  -continue rivastigmine patch started 8/2 to target neurocognitive disorder     Laboratory/Imaging: weekly WBC and ANC for clozapine monitoring continues, last ANC 8/13 was 3.2 next ANC 8/20; weekly covid  negative 8/10      Patient will be treated in therapeutic milieu with appropriate individual and group therapies as described.     Medical treatment/interventions:  Medical concerns:   1) IM consult placed on admission, per consult note on 7/1/21:   Diagnoses:    #Major neurocognitive disorder dt hx of TBI and alcohol use disorder  #Schizophrenia  #Under commitment  Had been residing in group home prior to admission.  Brought to hospital for evaluation of aggressive behaviors. Group home now unwilling to take him back. Has had numerous CODE 21s called this stay. Seen by psych, meds adjusted. Is currently under civil commitment and court hold through [facility] until 7/31/21. Please see recent discharge summary for details on 6/30/20201.   -Management per psychiatry      #Vitamin D Deficiency- Vit D level 16. Has been in hospital since Sept 2020. Started on cholecalciferol 61643 units on 6/17. Continue high-dose weekly replacement for 6 weeks total. Following high dose replacement recommend starting vitamin D 2000 international unit(s) daily replacement (start date 8/5/2021).      #Possible Tardive Dyskinesia vs EPSE- Per psych assessment on 4/1/21, noted to have possible tardive dyskinesia vs extrapyramidal side effects of meds.  - At that time, recommended to increase Cogentin to 1mg BID and add vitamin E 800 international unit(s) daily.      #Hx of CVA - hx of basal ganglia stroke in 2015. No focal neuro deficits aside from cognitive dysfunction as above.      #Hyperlipidemia - Continue PTA ASA 81 mg daily and atorvastatin 80 mg daily.      #COPD - Not O2 dependent. Continue PTA on salmeterol and albuterol PRN. Patient occasionally refusing inhalers, encourage compliance. Mucinex added for intermittent cough at [facility].      #Hypothyroidism- TSH 3.18 on 9/2020. Repeat recently on 5/29/2021 slightly elevated 5.18, with no T4 . Continue PTA levothyroxine 88 mcg daily. Repeat TSH with reflex to T4.      #Hx of  infective endocarditis with severe mitral and aortic regurgitation S/P bioprosthetic valve replacement (10/2015)  #Hx of HFrEF, now recovered, not in acute exacerbation  Follows with Dr. Dockery of [facility], last seen in 1/2020. Echocardiogram in 5/2016 with EF 50%, no evidence for prosthetic mitral and aortic valve dysfunction.   - Follow-up with outpatient Cardiology upon dismissal from the hospital (on every 2 year follow-ups).  - Continue ASA 81 mg daily      #Tobacco use disorder- Using nicotine patch and PRN gum.     #Non-severe malnutrition- Nutrition consulted and following at OSH.      Ordered CMP, CBC, and TSH with reflex.  No further recommendations at this time. Please page the on-call SHREE for any intercurrent medical issues which arise.      ADDENDUM: TSH normal. CBC normal. ALT elevated at 174, with unsatisfactory AST. Per chart review, LFTs last checked in December with  and . T bili and alk phos.  Reviewed with pharmacy, and possible medications causing would be Haldol, depakote, and statin. Discussed with psych who will taper haldol and Depakote. Holding statin, will check LDL.  Will repeat LFTs in AM, and check CK. Medicine will follow up on repeat LFTs, and CK.      Edith Alfaro PA-C  Hospitalist Service     2) Per updated progress note by IM 7/4:   A/P:  #Transaminitis - slowly rising LFTs since December. Asymptomatic. Reviewed with pharmacy, and possible medications causing would be Haldol, depakote, and statin. Discussed with psych who will taper haldol and Depakote. Holding statin, (total cholesterol 91, with LDL 41). Repeat LFTs continue to rise slightly.   -Abdominal US tomorrow (NPO after midnight)  -Hepatitis B surface ab and agn and Hep C ab   -Repeat LFTs in 3 days     Medicine will follow up on Abdominal US, viral hepatitis studies and repeat LFTs.  No further recommendations at this time. Please page the on-call SHREE for any intercurrent medical  issues which arise.      Edith Alfaro PA-C  Hospitalist Service     Per progress note 7/12:      Brief Medicine Note     Medicine following peripherally for LFT monitoring.  LFTs today improved:  (182) and ALT 92 (129). T bili and ALP WNL.      Today's vital signs, medications, and nursing notes were reviewed.       /77 (BP Location: Left arm)   Pulse 110   Temp 97.8  F (36.6  C) (Oral)   Resp 16   Ht 1.829 m (6')   Wt 87.5 kg (192 lb 14.4 oz)   SpO2 95%   BMI 26.16 kg/m       Continue current plan of care. Will continue to trend CMP In 3 days to ensure continued downtrend.      Kim Harman PA-C  Hospitalist Service        3) Urinary incontinence and stool incontinence: RN staff first reported urinary incontinence over weekend 7/9, per chart review patient has history of intermittent urinary incontinence going back to at least 2016, continues to have intermittent episodes of incontinence, continues to have both urinary and stool incontinence, placed IM consult for further assessment of need for additional work up given pts limitations as historian, appreciate assistance, no abnormalities noted on evaluation including bladder scan. Patient declining to wear incontinence briefs most days, staff continuing to encourage.      This note was created by undersigned using a Dragon dictation system. All typing errors or contextual distortion are unintentional and software inherent.     Disposition Plan   Reason for ongoing admission: is unable to care for self due to severe psychosis or mariusz and neurocognitive disorder  Discharge location: [facility], likely locked NH facility , unable to progress on discharge planning due to guardianship not being in place, see CTC notes  Discharge Medications: not ordered  Follow-up Appointments: not scheduled  Legal Status: Full MI commitment and Yanez     Entered by: Jeanette Dobbins on 8/16/2021 at 7:19 AM

## 2021-08-17 PROCEDURE — 250N000013 HC RX MED GY IP 250 OP 250 PS 637: Performed by: PSYCHIATRY & NEUROLOGY

## 2021-08-17 PROCEDURE — 99232 SBSQ HOSP IP/OBS MODERATE 35: CPT | Performed by: PSYCHIATRY & NEUROLOGY

## 2021-08-17 PROCEDURE — 124N000002 HC R&B MH UMMC

## 2021-08-17 PROCEDURE — 250N000013 HC RX MED GY IP 250 OP 250 PS 637: Performed by: PHYSICIAN ASSISTANT

## 2021-08-17 PROCEDURE — 99207 PR CDG-MDM COMPONENT: MEETS MODERATE - DOWN CODED: CPT | Performed by: PSYCHIATRY & NEUROLOGY

## 2021-08-17 RX ADMIN — GUAIFENESIN 600 MG: 600 TABLET, EXTENDED RELEASE ORAL at 08:52

## 2021-08-17 RX ADMIN — MEMANTINE 10 MG: 5 TABLET ORAL at 21:35

## 2021-08-17 RX ADMIN — SALMETEROL XINAFOATE 1 PUFF: 50 POWDER, METERED ORAL; RESPIRATORY (INHALATION) at 09:05

## 2021-08-17 RX ADMIN — LEVOTHYROXINE SODIUM 88 MCG: 88 TABLET ORAL at 08:52

## 2021-08-17 RX ADMIN — BENZTROPINE MESYLATE 1 MG: 1 TABLET ORAL at 08:51

## 2021-08-17 RX ADMIN — MEMANTINE 10 MG: 5 TABLET ORAL at 08:51

## 2021-08-17 RX ADMIN — CLOZAPINE 300 MG: 100 TABLET ORAL at 21:35

## 2021-08-17 RX ADMIN — SALMETEROL XINAFOATE 1 PUFF: 50 POWDER, METERED ORAL; RESPIRATORY (INHALATION) at 21:38

## 2021-08-17 RX ADMIN — ASPIRIN 81 MG CHEWABLE TABLET 81 MG: 81 TABLET CHEWABLE at 08:52

## 2021-08-17 RX ADMIN — Medication 50 MCG: at 08:51

## 2021-08-17 RX ADMIN — RIVASTIGMINE 1 PATCH: 4.6 PATCH TRANSDERMAL at 08:52

## 2021-08-17 RX ADMIN — BENZTROPINE MESYLATE 1 MG: 1 TABLET ORAL at 21:36

## 2021-08-17 RX ADMIN — METOPROLOL TARTRATE 25 MG: 25 TABLET, FILM COATED ORAL at 21:36

## 2021-08-17 RX ADMIN — POLYETHYLENE GLYCOL 3350 17 G: 17 POWDER, FOR SOLUTION ORAL at 08:51

## 2021-08-17 RX ADMIN — ATORVASTATIN CALCIUM 80 MG: 80 TABLET, FILM COATED ORAL at 21:36

## 2021-08-17 RX ADMIN — METOPROLOL TARTRATE 25 MG: 25 TABLET, FILM COATED ORAL at 08:52

## 2021-08-17 RX ADMIN — DOCUSATE SODIUM 100 MG: 100 CAPSULE, LIQUID FILLED ORAL at 21:36

## 2021-08-17 RX ADMIN — GUAIFENESIN 600 MG: 600 TABLET, EXTENDED RELEASE ORAL at 21:36

## 2021-08-17 RX ADMIN — DOCUSATE SODIUM 100 MG: 100 CAPSULE, LIQUID FILLED ORAL at 08:52

## 2021-08-17 ASSESSMENT — ACTIVITIES OF DAILY LIVING (ADL)
HYGIENE/GROOMING: INDEPENDENT
LAUNDRY: WITH SUPERVISION
ORAL_HYGIENE: INDEPENDENT
DRESS: INDEPENDENT

## 2021-08-17 NOTE — PLAN OF CARE
Problem: Behavioral Health Plan of Care  Goal: Plan of Care Review  Outcome: Improving  Flowsheets (Taken 8/16/2021 1840)  Plan of Care Reviewed With: patient  Patient Agreement with Plan of Care: agrees   Pt has been visible in the lounge at the start of the shift. He presented with a flat affect, fair mood, and delayed responses. Pt denies SI/SIB, states that he is eating and sleeping well. There were no A/VH's, anxiety, depression, and medication side effects observed. He is oriented to himself and placed only. Will monitor his bowel status; pt does not remember the date of his last BM. He is on Miralax and Colace. Pt stated goal is to watch a movie before going to bed. He is medication compliant.

## 2021-08-17 NOTE — PLAN OF CARE
Assessment/Intervention/Current Symtoms and Care Coordination  -Chart review  -Pre round meeting with team  -Rounded with team, addressed patient needs/concerns  -Post round meeting with team  Current Symptoms include the following: disorganization      I am gathering records to help with pursuit of guardianship.    Discharge Plan or Goal  Pending stabilization & development of a safe discharge plan.  Considerations include: locked nursing home    Barriers to Discharge  Patient requires further psychiatric stabilization due to current symptomology    Referral Status  locked nursing home    Legal Status  Patient is under MI commitment in [facility]

## 2021-08-17 NOTE — PROGRESS NOTES
"[facility], [place]   Psychiatric Progress Note  Hospital Day: 48        Interim History:   The patient's care was discussed with the treatment team during the daily team meeting and/or staff's chart notes were reviewed.  Staff report patient was visible in lounge, eating and sleeping well, taking medications as prescribed, no acute events overnight.     Upon interview, patient was lying in bed awake resting. Reports he is still feeling tired, mood is \"okay\", denies SI or HI, denies AVH. Tolerating medications, denies noticing any new/worsening side effects, denies noticing any involuntary movements. Again encouraged him to wear incontinence briefs as he did not have them on. Discussed concern for his health including possible skin breakdown with ongoing incontinence and not showering when staff prompt him to. He states he will shower \"in an hour\", again attempted to provide education around importance of hygiene and he again repeated \"in an hour\". He denies having any pain or discomfort or other physical health concerns.     Later patient approach writer and asked what floor he was on, he was provided orientation to place and situation, he remained only oriented to self.          Medications:       aspirin  81 mg Oral Daily     atorvastatin  80 mg Oral At Bedtime     benztropine  1 mg Oral BID     cholecalciferol  1,250 mcg Oral Q7 Days     cloZAPine  300 mg Oral At Bedtime     docusate sodium  100 mg Oral BID     guaiFENesin  600 mg Oral BID     levothyroxine  88 mcg Oral Daily     memantine  10 mg Oral BID     metoprolol tartrate  25 mg Oral BID     polyethylene glycol  17 g Oral Daily     rivastigmine  1 patch Transdermal Daily     rivastigmine   Transdermal Q8H     salmeterol  1 puff Inhalation BID     vitamin D3  50 mcg Oral Daily          Allergies:     Allergies   Allergen Reactions     Ibuprofen      Latex           Labs:     No results found for this or any previous visit (from " "the past 48 hour(s)).       Psychiatric Examination:     /84   Pulse 91   Temp 97.3  F (36.3  C) (Oral)   Resp 16   Ht 1.829 m (6')   Wt 89.1 kg (196 lb 8 oz)   SpO2 97%   BMI 26.65 kg/m    Weight is 196 lbs 8 oz  Body mass index is 26.65 kg/m .    Orthostatic Vitals       Most Recent      Sitting Orthostatic /80 08/17 1600    Sitting Orthostatic Pulse (bpm) 90 08/17 1600    Standing Orthostatic /82 08/17 0941    Standing Orthostatic Pulse (bpm) 93 08/17 0941        Appearance: awake, alert and disheveled, had been incontinent in AM and still declining to shower  Attitude: somewhat cooperative  Eye Contact:  fair   Mood:  \"okay\"  Affect:  mood congruent and intensity is flat  Speech:  soft volume, raspy ,dysarthric   Language: fluent and intact in English  Psychomotor, Gait, Musculoskeletal:  no evidence of tardive dyskinesia, dystonia, or tics, minimal movements of buccolingual dyskinesia present during interview today  Thought Process:  perseverative on discharge otherwise disorganized  Associations:  no loose associations  Thought Content:  no evidence of suicidal ideation or homicidal ideation and delusional/grandiose  Insight:  limited  Judgement:  limited  Oriented to:  person  Attention Span and Concentration:  limited  Recent and Remote Memory:  limited  Fund of Knowledge:  delayed    Clinical Global Impressions  First:  Considering your total clinical experience with this particular patient population, how severe are the patient's symptoms at this time?: 7 (07/01/21 0630)  Compared to the patient's condition at the START of treatment, this patient's condition is: 4 (07/01/21 0630)  Most recent:  Considering your total clinical experience with this particular patient population, how severe are the patient's symptoms at this time?: 7 (08/12/21 1836)  Compared to the patient's condition at the START of treatment, this patient's condition is: 4 (08/12/21 1836)           Precautions: "     Behavioral Orders   Procedures     Assault precautions     Code 2     Elopement precautions     Routine Programming     As clinically indicated     Single Room     Status 15     Every 15 minutes.          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   Stimulant use disorder, in remission.   Transaminitis, possibly medication induced   Hx of CVA  Vit D deficiency  Hypertension  COPD  Hx of infective endocarditis with severe mitral and aortic regurgitation s/p biprostehtic valve replacement 2015  Hx of HFrEF now recovered  Tobacco use disorder  Non-severe malnutrition   Urinary incontinence         Assessment & Plan:   Assessment and hospital summary:  The patient is a 54yo male with a history of schizophrenia and TBI and multiple medical co-morbidities as above who was admitted after being [facility] after a nearly year-long stay. Is currently under commitment with Yanez recently amended to include Clozaril. While at [facility] was noted to have ongoing agitation and frequently attempting to elope from unit requiring 1:1 staffing for safety. He was started on 1:1 staff upon transfer, this was discontinued as patient has been more cooperative without elopement attempts. IM consult completed on admission and continued to follow due to elevated LFTs. Haldol and VPA discontinued due to LFTs. Cross titration completed from risperidone to clozaril after Yanez amended. EPSE/TD movements noted, have appeared stable over past few weeks.     Psychiatric treatment/inteventions:  Medications:   -continue Clozaril 300mg at bedtime for psychosis and agitation. Will not adjust dose further at this time, movements consistent with TD noted, and level in therapeutic range   -continue benztropine 1mg BID for EPSE  -continue  memantine 5mg daily for neurocognitive disorder  -continue risperdal 1mg Q6H PRN agitation   -continue lorazepam 0.5-1.0mg Q4H PRN anxiety or severe agitation  -continue rivastigmine patch started 8/2 to target neurocognitive disorder     Laboratory/Imaging: weekly WBC and ANC for clozapine monitoring continues, last ANC 8/13 was 3.2 next ANC 8/20; weekly covid negative 8/10      Patient will be treated in therapeutic milieu with appropriate individual and group therapies as described.     Medical treatment/interventions:  Medical concerns:   1) IM consult placed on admission, per consult note on 7/1/21:   Diagnoses:    #Major neurocognitive disorder dt hx of TBI and alcohol use disorder  #Schizophrenia  #Under commitment  Had been residing in group home prior to admission.  Brought to hospital for evaluation of aggressive behaviors. Group home now unwilling to take him back. Has had numerous CODE 21s called this stay. Seen by psych, meds adjusted. Is currently under civil commitment and court hold through [facility] until 7/31/21. Please see recent discharge summary for details on 6/30/20201.   -Management per psychiatry      #Vitamin D Deficiency- Vit D level 16. Has been in hospital since Sept 2020. Started on cholecalciferol 17915 units on 6/17. Continue high-dose weekly replacement for 6 weeks total. Following high dose replacement recommend starting vitamin D 2000 international unit(s) daily replacement (start date 8/5/2021).      #Possible Tardive Dyskinesia vs EPSE- Per psych assessment on 4/1/21, noted to have possible tardive dyskinesia vs extrapyramidal side effects of meds.  - At that time, recommended to increase Cogentin to 1mg BID and add vitamin E 800 international unit(s) daily.      #Hx of CVA - hx of basal ganglia stroke in 2015. No focal neuro deficits aside from cognitive dysfunction as above.      #Hyperlipidemia - Continue PTA ASA 81 mg daily and atorvastatin 80 mg daily.      #COPD - Not  O2 dependent. Continue PTA on salmeterol and albuterol PRN. Patient occasionally refusing inhalers, encourage compliance. Mucinex added for intermittent cough at [facility].      #Hypothyroidism- TSH 3.18 on 9/2020. Repeat recently on 5/29/2021 slightly elevated 5.18, with no T4 . Continue PTA levothyroxine 88 mcg daily. Repeat TSH with reflex to T4.      #Hx of infective endocarditis with severe mitral and aortic regurgitation S/P bioprosthetic valve replacement (10/2015)  #Hx of HFrEF, now recovered, not in acute exacerbation  Follows with Dr. Dockery of [facility], last seen in 1/2020. Echocardiogram in 5/2016 with EF 50%, no evidence for prosthetic mitral and aortic valve dysfunction.   - Follow-up with outpatient Cardiology upon dismissal from the hospital (on every 2 year follow-ups).  - Continue ASA 81 mg daily      #Tobacco use disorder- Using nicotine patch and PRN gum.     #Non-severe malnutrition- Nutrition consulted and following at OSH.      Ordered CMP, CBC, and TSH with reflex.  No further recommendations at this time. Please page the on-call SHREE for any intercurrent medical issues which arise.      ADDENDUM: TSH normal. CBC normal. ALT elevated at 174, with unsatisfactory AST. Per chart review, LFTs last checked in December with  and . T bili and alk phos.  Reviewed with pharmacy, and possible medications causing would be Haldol, depakote, and statin. Discussed with psych who will taper haldol and Depakote. Holding statin, will check LDL.  Will repeat LFTs in AM, and check CK. Medicine will follow up on repeat LFTs, and CK.      Edith Alfaro PA-C  Hospitalist Service     2) Per updated progress note by IM 7/4:   A/P:  #Transaminitis - slowly rising LFTs since December. Asymptomatic. Reviewed with pharmacy, and possible medications causing would be Haldol, depakote, and statin. Discussed with psych who will taper haldol and Depakote. Holding statin, (total  cholesterol 91, with LDL 41). Repeat LFTs continue to rise slightly.   -Abdominal US tomorrow (NPO after midnight)  -Hepatitis B surface ab and agn and Hep C ab   -Repeat LFTs in 3 days     Medicine will follow up on Abdominal US, viral hepatitis studies and repeat LFTs.  No further recommendations at this time. Please page the on-call SHREE for any intercurrent medical issues which arise.      Edith Alfaro PA-C  Hospitalist Service     Per progress note 7/12:      Brief Medicine Note     Medicine following peripherally for LFT monitoring.  LFTs today improved:  (182) and ALT 92 (129). T bili and ALP WNL.      Today's vital signs, medications, and nursing notes were reviewed.       /77 (BP Location: Left arm)   Pulse 110   Temp 97.8  F (36.6  C) (Oral)   Resp 16   Ht 1.829 m (6')   Wt 87.5 kg (192 lb 14.4 oz)   SpO2 95%   BMI 26.16 kg/m       Continue current plan of care. Will continue to trend CMP In 3 days to ensure continued downtrend.      Kim Harman PA-C  Hospitalist Service        3) Urinary incontinence and stool incontinence: RN staff first reported urinary incontinence over weekend 7/9, per chart review patient has history of intermittent urinary incontinence going back to at least 2016, continues to have intermittent episodes of incontinence, continues to have both urinary and stool incontinence, placed IM consult for further assessment of need for additional work up given pts limitations as historian, appreciate assistance, no abnormalities noted on evaluation including bladder scan. Patient declining to wear incontinence briefs most days, staff continuing to encourage.      This note was created by undersigned using a Dragon dictation system. All typing errors or contextual distortion are unintentional and software inherent.     Disposition Plan   Reason for ongoing admission: is unable to care for self due to severe psychosis or mariusz and neurocognitive disorder  Discharge  location: TBD, likely locked NH facility , unable to progress on discharge planning due to guardianship not being in place, see [facility] notes  Discharge Medications: not ordered  Follow-up Appointments: not scheduled  Legal Status: Full MI commitment and Yanez     Entered by: Jeanette Dobbins on 8/17/2021 at 7:25 AM

## 2021-08-17 NOTE — PLAN OF CARE
Problem: Sleep Disturbance  Goal: Adequate Sleep/Rest  Outcome: Improving   Pt. slept 6.75hrs during the night.No psych SxS observed.No report of SI/SIB or HI. Pt observed to be breathing normally. The author gave no medications.

## 2021-08-17 NOTE — PLAN OF CARE
"  Problem: Psychotic Symptoms  Goal: Psychotic Symptoms  Description: Signs and symptoms of listed problems will be absent or manageable.  Outcome: No Change       John out in the lounge at times, watching TV.  Pt denies all mental health symptoms.  Pt continues to talk to himself.  No interaction with peers.  Pt was incontinent of urine this morning.  Linen changed and pt given clean scrubs.  Encouraged pt to shower pt stated \"later\"  Pt is oriented to self only.  Asking \"when am I leaving?\"  Good appetite has been eating his meals.  No behavioral concerns.  Will continue to assess.    "

## 2021-08-18 PROCEDURE — 250N000013 HC RX MED GY IP 250 OP 250 PS 637: Performed by: PHYSICIAN ASSISTANT

## 2021-08-18 PROCEDURE — 99232 SBSQ HOSP IP/OBS MODERATE 35: CPT | Performed by: PSYCHIATRY & NEUROLOGY

## 2021-08-18 PROCEDURE — 124N000002 HC R&B MH UMMC

## 2021-08-18 PROCEDURE — 250N000013 HC RX MED GY IP 250 OP 250 PS 637: Performed by: PSYCHIATRY & NEUROLOGY

## 2021-08-18 RX ADMIN — MEMANTINE 10 MG: 5 TABLET ORAL at 20:34

## 2021-08-18 RX ADMIN — POLYETHYLENE GLYCOL 3350 17 G: 17 POWDER, FOR SOLUTION ORAL at 08:55

## 2021-08-18 RX ADMIN — DOCUSATE SODIUM 100 MG: 100 CAPSULE, LIQUID FILLED ORAL at 20:34

## 2021-08-18 RX ADMIN — LEVOTHYROXINE SODIUM 88 MCG: 88 TABLET ORAL at 08:56

## 2021-08-18 RX ADMIN — METOPROLOL TARTRATE 25 MG: 25 TABLET, FILM COATED ORAL at 20:34

## 2021-08-18 RX ADMIN — RIVASTIGMINE 1 PATCH: 4.6 PATCH TRANSDERMAL at 08:56

## 2021-08-18 RX ADMIN — ASPIRIN 81 MG CHEWABLE TABLET 81 MG: 81 TABLET CHEWABLE at 08:56

## 2021-08-18 RX ADMIN — SALMETEROL XINAFOATE 1 PUFF: 50 POWDER, METERED ORAL; RESPIRATORY (INHALATION) at 20:34

## 2021-08-18 RX ADMIN — GUAIFENESIN 600 MG: 600 TABLET, EXTENDED RELEASE ORAL at 20:34

## 2021-08-18 RX ADMIN — GUAIFENESIN 600 MG: 600 TABLET, EXTENDED RELEASE ORAL at 08:56

## 2021-08-18 RX ADMIN — METOPROLOL TARTRATE 25 MG: 25 TABLET, FILM COATED ORAL at 08:56

## 2021-08-18 RX ADMIN — SALMETEROL XINAFOATE 1 PUFF: 50 POWDER, METERED ORAL; RESPIRATORY (INHALATION) at 09:01

## 2021-08-18 RX ADMIN — CLOZAPINE 300 MG: 100 TABLET ORAL at 20:34

## 2021-08-18 RX ADMIN — DOCUSATE SODIUM 100 MG: 100 CAPSULE, LIQUID FILLED ORAL at 08:56

## 2021-08-18 RX ADMIN — ATORVASTATIN CALCIUM 80 MG: 80 TABLET, FILM COATED ORAL at 20:34

## 2021-08-18 RX ADMIN — Medication 50 MCG: at 08:56

## 2021-08-18 RX ADMIN — BENZTROPINE MESYLATE 1 MG: 1 TABLET ORAL at 08:56

## 2021-08-18 RX ADMIN — MEMANTINE 10 MG: 5 TABLET ORAL at 08:56

## 2021-08-18 RX ADMIN — BENZTROPINE MESYLATE 1 MG: 1 TABLET ORAL at 20:34

## 2021-08-18 ASSESSMENT — MIFFLIN-ST. JEOR: SCORE: 1757.04

## 2021-08-18 NOTE — PROGRESS NOTES
"[facility], [place]   Psychiatric Progress Note  Hospital Day: 49        Interim History:   The patient's care was discussed with the treatment team during the daily team meeting and/or staff's chart notes were reviewed.  Staff report patient has been visible in the milieu, continues to appear to talk to self, be resistant to some cares including tending to ADLs, taking shower, taking medications as prescribed, no acute events overnight.     Upon interview, pt was lying in bed with head at foot of bed, was sleeping, awoke to voice. Reports mood is \"tired\" and discussed recommendation for him to shower, he states he is feeling too tired to do this, again provided education around concerns for skin breakdown, etc with poor hygiene and ongoing incontinence. He became increasingly irritable and stated that he took a shower yesterday, however staff report does not confirm this. He is tolerating medications, denies any new/worsening side effects. More TD movements noted today and he continues to deny that he notices them. Denies SI or HI, denies AVH. Oriented only to self. Inquired about discharge and again told team is working on placement. No additional concerns.          Medications:       aspirin  81 mg Oral Daily     atorvastatin  80 mg Oral At Bedtime     benztropine  1 mg Oral BID     cholecalciferol  1,250 mcg Oral Q7 Days     cloZAPine  300 mg Oral At Bedtime     docusate sodium  100 mg Oral BID     guaiFENesin  600 mg Oral BID     levothyroxine  88 mcg Oral Daily     memantine  10 mg Oral BID     metoprolol tartrate  25 mg Oral BID     polyethylene glycol  17 g Oral Daily     rivastigmine  1 patch Transdermal Daily     rivastigmine   Transdermal Q8H     salmeterol  1 puff Inhalation BID     vitamin D3  50 mcg Oral Daily          Allergies:     Allergies   Allergen Reactions     Ibuprofen      Latex           Labs:     No results found for this or any previous visit (from the past 48 " "hour(s)).       Psychiatric Examination:     /83   Pulse 96   Temp 97.9  F (36.6  C) (Tympanic)   Resp 16   Ht 1.829 m (6')   Wt 89.1 kg (196 lb 8 oz)   SpO2 97%   BMI 26.65 kg/m    Weight is 196 lbs 8 oz  Body mass index is 26.65 kg/m .    Orthostatic Vitals       Most Recent      Sitting Orthostatic /80 08/17 1600    Sitting Orthostatic Pulse (bpm) 90 08/17 1600    Standing Orthostatic /82 08/17 0941    Standing Orthostatic Pulse (bpm) 93 08/17 0941        Appearance: awake, alert and disheveled, had been incontinent past 2 AMs and still declining to shower  Attitude: somewhat cooperative  Eye Contact:  fair   Mood:  \"tired\", more irritable as interview progressed  Affect:  mood congruent and intensity is flat  Speech:  soft volume, raspy ,dysarthric   Language: fluent and intact in English  Psychomotor, Gait, Musculoskeletal:  no evidence of tardive dyskinesia, dystonia, or tics, minimal movements of buccolingual dyskinesia present during interview today  Thought Process:  perseverative on discharge otherwise disorganized  Associations:  no loose associations  Thought Content:  no evidence of suicidal ideation or homicidal ideation and delusional/grandiose  Insight:  limited  Judgement:  limited  Oriented to:  person  Attention Span and Concentration:  limited  Recent and Remote Memory:  limited  Fund of Knowledge:  delayed    Clinical Global Impressions  First:  Considering your total clinical experience with this particular patient population, how severe are the patient's symptoms at this time?: 7 (07/01/21 0630)  Compared to the patient's condition at the START of treatment, this patient's condition is: 4 (07/01/21 0630)  Most recent:  Considering your total clinical experience with this particular patient population, how severe are the patient's symptoms at this time?: 7 (08/12/21 1836)  Compared to the patient's condition at the START of treatment, this patient's condition is: 4 " (08/12/21 3916)           Precautions:     Behavioral Orders   Procedures     Assault precautions     Code 2     Elopement precautions     Routine Programming     As clinically indicated     Single Room     Status 15     Every 15 minutes.          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   Stimulant use disorder, in remission.   Transaminitis, possibly medication induced   Hx of CVA  Vit D deficiency  Hypertension  COPD  Hx of infective endocarditis with severe mitral and aortic regurgitation s/p biprostehtic valve replacement 2015  Hx of HFrEF now recovered  Tobacco use disorder  Non-severe malnutrition   Urinary incontinence         Assessment & Plan:   Assessment and hospital summary:  The patient is a 54yo male with a history of schizophrenia and TBI and multiple medical co-morbidities as above who was admitted after being transferred from [facility] medical [facility] after a nearly year-long stay. Is currently under commitment with [facility]ane[facility] recently amended to include Clozaril. While at [facility] was noted to have ongoing agitation and frequently attempting to elope from unit requiring 1:1 staffing for safety. He was started on 1:1 staff upon transfer, this was discontinued as patient has been more cooperative without elopement attempts. IM consult completed on admission and continued to follow due to elevated LFTs. Haldol and VPA discontinued due to LFTs. Cross titration completed from risperidone to clozaril after Yanez amended. EPSE/TD movements noted, have appeared stable over past few weeks.     Psychiatric treatment/inteventions:  Medications:   -continue Clozaril 300mg at bedtime for psychosis and agitation. Will not adjust dose further at this time, movements consistent with TD noted, and level in therapeutic range   -continue  benztropine 1mg BID for EPSE  -continue memantine 5mg daily for neurocognitive disorder  -continue risperdal 1mg Q6H PRN agitation   -continue lorazepam 0.5-1.0mg Q4H PRN anxiety or severe agitation  -continue rivastigmine patch started 8/2 to target neurocognitive disorder     Laboratory/Imaging: weekly WBC and ANC for clozapine monitoring continues, last ANC 8/13 was 3.2 next ANC 8/20; weekly covid ordered     Patient will be treated in therapeutic milieu with appropriate individual and group therapies as described.     Medical treatment/interventions:  Medical concerns:   1) IM consult placed on admission, per consult note on 7/1/21:   Diagnoses:    #Major neurocognitive disorder dt hx of TBI and alcohol use disorder  #Schizophrenia  #Under commitment  Had been residing in group home prior to admission.  Brought to hospital for evaluation of aggressive behaviors. Group home now unwilling to take him back. Has had numerous CODE 21s called this stay. Seen by psych, meds adjusted. Is currently under civil commitment and court hold through [facility] until 7/31/21. Please see recent discharge summary for details on 6/30/20201.   -Management per psychiatry      #Vitamin D Deficiency- Vit D level 16. Has been in hospital since Sept 2020. Started on cholecalciferol 80208 units on 6/17. Continue high-dose weekly replacement for 6 weeks total. Following high dose replacement recommend starting vitamin D 2000 international unit(s) daily replacement (start date 8/5/2021).      #Possible Tardive Dyskinesia vs EPSE- Per psych assessment on 4/1/21, noted to have possible tardive dyskinesia vs extrapyramidal side effects of meds.  - At that time, recommended to increase Cogentin to 1mg BID and add vitamin E 800 international unit(s) daily.      #Hx of CVA - hx of basal ganglia stroke in 2015. No focal neuro deficits aside from cognitive dysfunction as above.      #Hyperlipidemia - Continue PTA ASA 81 mg daily and  atorvastatin 80 mg daily.      #COPD - Not O2 dependent. Continue PTA on salmeterol and albuterol PRN. Patient occasionally refusing inhalers, encourage compliance. Mucinex added for intermittent cough at [facility].      #Hypothyroidism- TSH 3.18 on 9/2020. Repeat recently on 5/29/2021 slightly elevated 5.18, with no T4 . Continue PTA levothyroxine 88 mcg daily. Repeat TSH with reflex to T4.      #Hx of infective endocarditis with severe mitral and aortic regurgitation S/P bioprosthetic valve replacement (10/2015)  #Hx of HFrEF, now recovered, not in acute exacerbation  Follows with Dr. Dockery of [facility], last seen in 1/2020. Echocardiogram in 5/2016 with EF 50%, no evidence for prosthetic mitral and aortic valve dysfunction.   - Follow-up with outpatient Cardiology upon dismissal from the hospital (on every 2 year follow-ups).  - Continue ASA 81 mg daily      #Tobacco use disorder- Using nicotine patch and PRN gum.     #Non-severe malnutrition- Nutrition consulted and following at OSH.      Ordered CMP, CBC, and TSH with reflex.  No further recommendations at this time. Please page the on-call SHREE for any intercurrent medical issues which arise.      ADDENDUM: TSH normal. CBC normal. ALT elevated at 174, with unsatisfactory AST. Per chart review, LFTs last checked in December with  and . T bili and alk phos.  Reviewed with pharmacy, and possible medications causing would be Haldol, depakote, and statin. Discussed with psych who will taper haldol and Depakote. Holding statin, will check LDL.  Will repeat LFTs in AM, and check CK. Medicine will follow up on repeat LFTs, and CK.      Edith Alfaro PA-C  Hospitalist Service     2) Per updated progress note by IM 7/4:   A/P:  #Transaminitis - slowly rising LFTs since December. Asymptomatic. Reviewed with pharmacy, and possible medications causing would be Haldol, depakote, and statin. Discussed with psych who will taper haldol and  Depakote. Holding statin, (total cholesterol 91, with LDL 41). Repeat LFTs continue to rise slightly.   -Abdominal US tomorrow (NPO after midnight)  -Hepatitis B surface ab and agn and Hep C ab   -Repeat LFTs in 3 days     Medicine will follow up on Abdominal US, viral hepatitis studies and repeat LFTs.  No further recommendations at this time. Please page the on-call SHREE for any intercurrent medical issues which arise.      Edith Alfaro PA-C  Hospitalist Service     Per progress note 7/12:      Brief Medicine Note     Medicine following peripherally for LFT monitoring.  LFTs today improved:  (182) and ALT 92 (129). T bili and ALP WNL.      Today's vital signs, medications, and nursing notes were reviewed.       /77 (BP Location: Left arm)   Pulse 110   Temp 97.8  F (36.6  C) (Oral)   Resp 16   Ht 1.829 m (6')   Wt 87.5 kg (192 lb 14.4 oz)   SpO2 95%   BMI 26.16 kg/m       Continue current plan of care. Will continue to trend CMP In 3 days to ensure continued downtrend.      Kim Harman PA-C  Hospitalist Service        3) Urinary incontinence and stool incontinence: RN staff first reported urinary incontinence over weekend 7/9, per chart review patient has history of intermittent urinary incontinence going back to at least 2016, continues to have intermittent episodes of incontinence, continues to have both urinary and stool incontinence, placed IM consult for further assessment of need for additional work up given pts limitations as historian, appreciate assistance, no abnormalities noted on evaluation including bladder scan. Patient declining to wear incontinence briefs most days, staff continuing to encourage.      This note was created by abhiigned using a Dragon dictation system. All typing errors or contextual distortion are unintentional and software inherent.     Disposition Plan   Reason for ongoing admission: is unable to care for self due to severe psychosis or mariusz and  neurocognitive disorder  Discharge location: TBD, likely locked NH facility , unable to progress on discharge planning due to guardianship not being in place, see CTC notes  Discharge Medications: not ordered  Follow-up Appointments: not scheduled  Legal Status: Full MI commitment and Yanez     Entered by: Jeanette Dobbins on 8/18/2021 at 7:11 AM

## 2021-08-18 NOTE — PLAN OF CARE
Assessment/Intervention/Current Symtoms and Care Coordination  -Chart review  -Pre round meeting with team  -Rounded with team, addressed patient needs/concerns  -Post round meeting with team  Current Symptoms include the following: Psychosis, disorganization and confusion    Discharge Plan or Goal  Pending stabilization & development of a safe discharge plan.  Considerations include: locked nursing home    Barriers to Discharge  Patient requires further psychiatric stabilization due to current symptomology    Referral Status  locked nursing home    Legal Status  Patient is under MI commitment in [facility]

## 2021-08-18 NOTE — PLAN OF CARE
Problem: Sleep Disturbance  Goal: Adequate Sleep/Rest  Outcome: No Change   Patient was sleeping when shift commenced. Observed to have slept through the night  for about 7 hours with non labored respirations. No behavioral issues, no prn given.

## 2021-08-18 NOTE — PLAN OF CARE
Patient has spent majority of the shift in the milieu. Keeps to himself.  Patient denies suicidal ideation and self injurious thoughts. Denies anxiety and depression. Denies auditory and visual hallucinations. Observed talking to himself and mumbles under his breathe. Med compliant. Cooperative on the unit. Ate dinner.     Patient evaluation continues. Assessed mood,anxiety,thoughts and behavior.     Patient gradually  progressing towards goals.    Patient is encouraged to participate in groups and assisted to develop healthy coping skills.     VS reviewed: /83   Pulse 96   Temp 97.9  F (36.6  C) (Tympanic)   Resp 16   Ht 1.829 m (6')   Wt 89.1 kg (196 lb 8 oz)   SpO2 97%   BMI 26.65 kg/m      Length of stay: 48    Refer to daily team meeting notes for individualized plan of care. Nursing will continue to assess.

## 2021-08-18 NOTE — PLAN OF CARE
"  Problem: Cognitive Impairment (Psychotic Signs/Symptoms)  Goal: Optimal Cognitive Function (Psychotic Signs/Symptoms)  Outcome: No Change     Pt's affect is flat blunted, mood is labile.  Pt has been visible in the lounge at times.  No interaction with peers.  Pt ate meals and was medication compliant.  Pt was incontinent of urine this morning, linen changed and pt was encouraged to shower.  Pt stated \"am tired now need to nap.\"  Staff re-approached pt after lunch and he did agree to take a shower.  Will continue to assess.      "

## 2021-08-19 PROCEDURE — 250N000013 HC RX MED GY IP 250 OP 250 PS 637: Performed by: PHYSICIAN ASSISTANT

## 2021-08-19 PROCEDURE — 99232 SBSQ HOSP IP/OBS MODERATE 35: CPT | Performed by: PSYCHIATRY & NEUROLOGY

## 2021-08-19 PROCEDURE — 250N000013 HC RX MED GY IP 250 OP 250 PS 637: Performed by: PSYCHIATRY & NEUROLOGY

## 2021-08-19 PROCEDURE — 124N000002 HC R&B MH UMMC

## 2021-08-19 RX ADMIN — GUAIFENESIN 600 MG: 600 TABLET, EXTENDED RELEASE ORAL at 08:25

## 2021-08-19 RX ADMIN — DOCUSATE SODIUM 100 MG: 100 CAPSULE, LIQUID FILLED ORAL at 21:01

## 2021-08-19 RX ADMIN — CHOLECALCIFEROL CAP 1.25 MG (50000 UNIT) 1250 MCG: 1.25 CAP at 10:21

## 2021-08-19 RX ADMIN — CLOZAPINE 300 MG: 100 TABLET ORAL at 21:01

## 2021-08-19 RX ADMIN — LEVOTHYROXINE SODIUM 88 MCG: 88 TABLET ORAL at 08:25

## 2021-08-19 RX ADMIN — SALMETEROL XINAFOATE 1 PUFF: 50 POWDER, METERED ORAL; RESPIRATORY (INHALATION) at 21:02

## 2021-08-19 RX ADMIN — METOPROLOL TARTRATE 25 MG: 25 TABLET, FILM COATED ORAL at 08:25

## 2021-08-19 RX ADMIN — MEMANTINE 10 MG: 5 TABLET ORAL at 21:01

## 2021-08-19 RX ADMIN — GUAIFENESIN 600 MG: 600 TABLET, EXTENDED RELEASE ORAL at 21:01

## 2021-08-19 RX ADMIN — BENZTROPINE MESYLATE 1 MG: 1 TABLET ORAL at 21:01

## 2021-08-19 RX ADMIN — BENZTROPINE MESYLATE 1 MG: 1 TABLET ORAL at 08:25

## 2021-08-19 RX ADMIN — RIVASTIGMINE 1 PATCH: 4.6 PATCH TRANSDERMAL at 08:25

## 2021-08-19 RX ADMIN — MEMANTINE 10 MG: 5 TABLET ORAL at 08:26

## 2021-08-19 RX ADMIN — POLYETHYLENE GLYCOL 3350 17 G: 17 POWDER, FOR SOLUTION ORAL at 08:25

## 2021-08-19 RX ADMIN — Medication 50 MCG: at 08:25

## 2021-08-19 RX ADMIN — METOPROLOL TARTRATE 25 MG: 25 TABLET, FILM COATED ORAL at 21:01

## 2021-08-19 RX ADMIN — ATORVASTATIN CALCIUM 80 MG: 80 TABLET, FILM COATED ORAL at 21:01

## 2021-08-19 RX ADMIN — DOCUSATE SODIUM 100 MG: 100 CAPSULE, LIQUID FILLED ORAL at 08:25

## 2021-08-19 RX ADMIN — ASPIRIN 81 MG CHEWABLE TABLET 81 MG: 81 TABLET CHEWABLE at 08:25

## 2021-08-19 ASSESSMENT — ACTIVITIES OF DAILY LIVING (ADL)
DRESS: INDEPENDENT
HYGIENE/GROOMING: INDEPENDENT
LAUNDRY: WITH SUPERVISION
DRESS: INDEPENDENT
HYGIENE/GROOMING: INDEPENDENT
ORAL_HYGIENE: INDEPENDENT
LAUNDRY: WITH SUPERVISION
ORAL_HYGIENE: INDEPENDENT

## 2021-08-19 NOTE — PROGRESS NOTES
"[facility], [place]   Psychiatric Progress Note  Hospital Day: 50        Interim History:   The patient's care was discussed with the treatment team during the daily team meeting and/or staff's chart notes were reviewed.  Staff report patient has been visible in the milieu but keeps to self, blunted, observed to be talking to self, denying MH symptoms, took a shower during day shift, taking medications as prescribed, no acute events overnight.     Upon interview, patient reported mood is \"good\", he denies SI or HI, denies AVH. He is tolerating medications. Reports feeling well physically, denies having any pain anywhere, continues to state he is \"tired\". Denies noticing TD movements. Oriented to self.     Attempted to discuss recommendation for Covid vaccine with patient. Patient stated \"yes\" if he had heard of Covid, when asked what Covid was he stated \"I don't know\", he was not able to provide any further information. Attempted to provide education around R/B/A of obtaining a Covid vaccine, discussed in writers opinion as his treating physician that given his age and other co-morbidities including COPD he would be high risk for serious complications/illness course if he contracted Covid-19. He was not able to demonstrate capacity regarding this matter, he did not show understanding of Covid, what the R/B/A of receiving vaccine would be, he did not show understanding/awareness of his own physical health diagnosis that would make him high risk including COPD and age. Informed patient that in writer's opinion benefits outweigh risks for being vaccinated against Covid and given he has no next of kin acting as surrogate decision maker and awaiting patient being placed under guardianship, would ask another provider to give a second opinion regarding this recommendation. He stated \"okay\" and when asked if he would be willing to have a vaccine he stated \"I don't know\". Encouraged him to " "consider this further. No additional concerns.          Medications:       aspirin  81 mg Oral Daily     atorvastatin  80 mg Oral At Bedtime     benztropine  1 mg Oral BID     cholecalciferol  1,250 mcg Oral Q7 Days     cloZAPine  300 mg Oral At Bedtime     docusate sodium  100 mg Oral BID     guaiFENesin  600 mg Oral BID     levothyroxine  88 mcg Oral Daily     memantine  10 mg Oral BID     metoprolol tartrate  25 mg Oral BID     polyethylene glycol  17 g Oral Daily     rivastigmine  1 patch Transdermal Daily     rivastigmine   Transdermal Q8H     salmeterol  1 puff Inhalation BID     vitamin D3  50 mcg Oral Daily          Allergies:     Allergies   Allergen Reactions     Ibuprofen      Latex           Labs:     No results found for this or any previous visit (from the past 48 hour(s)).       Psychiatric Examination:     /82   Pulse 84   Temp 97  F (36.1  C) (Oral)   Resp 16   Ht 1.829 m (6')   Wt 89.4 kg (197 lb 1.6 oz)   SpO2 98%   BMI 26.73 kg/m    Weight is 197 lbs 1.6 oz  Body mass index is 26.73 kg/m .    Orthostatic Vitals       Most Recent      Lying Orthostatic BP 97/70 08/19 0900    Lying Orthostatic Pulse (bpm) 82 08/19 0900    Sitting Orthostatic /83 08/19 0900    Sitting Orthostatic Pulse (bpm) 88 08/19 0900          Appearance: awake, alert and untidy, did shower yesterday  Attitude: somewhat cooperative  Eye Contact:  fair   Mood:  \"good\", irritable  Affect:  mood congruent and intensity is flat  Speech:  soft volume, raspy ,dysarthric   Language: fluent and intact in English  Psychomotor, Gait, Musculoskeletal:  no evidence of tardive dyskinesia, dystonia, or tics, minimal movements of buccolingual dyskinesia present during interview today  Thought Process: disorganized  Associations:  no loose associations  Thought Content:  no evidence of suicidal ideation or homicidal ideation and delusional/grandiose  Insight:  limited  Judgement:  limited  Oriented to:  person  Attention Span " and Concentration:  limited  Recent and Remote Memory:  limited  Fund of Knowledge:  delayed    Clinical Global Impressions  First:  Considering your total clinical experience with this particular patient population, how severe are the patient's symptoms at this time?: 7 (07/01/21 0630)  Compared to the patient's condition at the START of treatment, this patient's condition is: 4 (07/01/21 0630)  Most recent:  Considering your total clinical experience with this particular patient population, how severe are the patient's symptoms at this time?: 7 (08/12/21 1836)  Compared to the patient's condition at the START of treatment, this patient's condition is: 4 (08/12/21 1836)           Precautions:     Behavioral Orders   Procedures     Assault precautions     Code 2     Elopement precautions     Routine Programming     As clinically indicated     Single Room     Status 15     Every 15 minutes.          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   Stimulant use disorder, in remission.   Transaminitis, possibly medication induced   Hx of CVA  Vit D deficiency  Hypertension  COPD  Hx of infective endocarditis with severe mitral and aortic regurgitation s/p biprostehtic valve replacement 2015  Hx of HFrEF now recovered  Tobacco use disorder  Non-severe malnutrition   Urinary incontinence         Assessment & Plan:   Assessment and hospital summary:  The patient is a 54yo male with a history of schizophrenia and TBI and multiple medical co-morbidities as above who was admitted after being transferred from [facility] medical [facility] after a nearly year-long stay. Is currently under commitment with Yanez recently amended to include Clozaril. While at [facility] was noted to have ongoing agitation and frequently attempting to elope from unit requiring  1:1 staffing for safety. He was started on 1:1 staff upon transfer, this was discontinued as patient has been more cooperative without elopement attempts. IM consult completed on admission and continued to follow due to elevated LFTs. Haldol and VPA discontinued due to LFTs. Cross titration completed from risperidone to clozaril after Yanez amended. EPSE/TD movements noted, have appeared stable over past few weeks.     Psychiatric treatment/inteventions:  Medications:   -continue Clozaril 300mg at bedtime for psychosis and agitation. Will not adjust dose further at this time, movements consistent with TD noted, and level in therapeutic range   -continue benztropine 1mg BID for EPSE  -continue memantine 5mg daily for neurocognitive disorder  -continue risperdal 1mg Q6H PRN agitation   -continue lorazepam 0.5-1.0mg Q4H PRN anxiety or severe agitation  -continue rivastigmine patch started 8/2 to target neurocognitive disorder     Laboratory/Imaging: weekly WBC and ANC for clozapine monitoring continues, last ANC 8/13 was 3.2 next ANC 8/20; weekly covid ordered     Patient will be treated in therapeutic milieu with appropriate individual and group therapies as described.     Medical treatment/interventions:  Medical concerns:   1) IM consult placed on admission, per consult note on 7/1/21:   Diagnoses:    #Major neurocognitive disorder dt hx of TBI and alcohol use disorder  #Schizophrenia  #Under commitment  Had been residing in group home prior to admission.  Brought to hospital for evaluation of aggressive behaviors. Group home now unwilling to take him back. Has had numerous CODE 21s called this stay. Seen by psych, meds adjusted. Is currently under civil commitment and court hold through [facility] until 7/31/21. Please see recent discharge summary for details on 6/30/20201.   -Management per psychiatry      #Vitamin D Deficiency- Vit D level 16. Has been in hospital since Sept 2020. Started on cholecalciferol  76149 units on 6/17. Continue high-dose weekly replacement for 6 weeks total. Following high dose replacement recommend starting vitamin D 2000 international unit(s) daily replacement (start date 8/5/2021).      #Possible Tardive Dyskinesia vs EPSE- Per psych assessment on 4/1/21, noted to have possible tardive dyskinesia vs extrapyramidal side effects of meds.  - At that time, recommended to increase Cogentin to 1mg BID and add vitamin E 800 international unit(s) daily.      #Hx of CVA - hx of basal ganglia stroke in 2015. No focal neuro deficits aside from cognitive dysfunction as above.      #Hyperlipidemia - Continue PTA ASA 81 mg daily and atorvastatin 80 mg daily.      #COPD - Not O2 dependent. Continue PTA on salmeterol and albuterol PRN. Patient occasionally refusing inhalers, encourage compliance. Mucinex added for intermittent cough at [facility].      #Hypothyroidism- TSH 3.18 on 9/2020. Repeat recently on 5/29/2021 slightly elevated 5.18, with no T4 . Continue PTA levothyroxine 88 mcg daily. Repeat TSH with reflex to T4.      #Hx of infective endocarditis with severe mitral and aortic regurgitation S/P bioprosthetic valve replacement (10/2015)  #Hx of HFrEF, now recovered, not in acute exacerbation  Follows with Dr. Dockery of [facility] V[facility] Card[facility], last seen in 1/2020. Echocardiogram in 5/2016 with EF 50%, no evidence for prosthetic mitral and aortic valve dysfunction.   - Follow-up with outpatient Cardiology upon dismissal from the hospital (on every 2 year follow-ups).  - Continue ASA 81 mg daily      #Tobacco use disorder- Using nicotine patch and PRN gum.     #Non-severe malnutrition- Nutrition consulted and following at OSH.      Ordered CMP, CBC, and TSH with reflex.  No further recommendations at this time. Please page the on-call SHREE for any intercurrent medical issues which arise.      ADDENDUM: TSH normal. CBC normal. ALT elevated at 174, with unsatisfactory AST. Per chart review, LFTs  last checked in December with  and . T bili and alk phos.  Reviewed with pharmacy, and possible medications causing would be Haldol, depakote, and statin. Discussed with psych who will taper haldol and Depakote. Holding statin, will check LDL.  Will repeat LFTs in AM, and check CK. Medicine will follow up on repeat LFTs, and CK.      Edith Alfaro PA-C  Hospitalist Service     2) Per updated progress note by IM 7/4:   A/P:  #Transaminitis - slowly rising LFTs since December. Asymptomatic. Reviewed with pharmacy, and possible medications causing would be Haldol, depakote, and statin. Discussed with psych who will taper haldol and Depakote. Holding statin, (total cholesterol 91, with LDL 41). Repeat LFTs continue to rise slightly.   -Abdominal US tomorrow (NPO after midnight)  -Hepatitis B surface ab and agn and Hep C ab   -Repeat LFTs in 3 days     Medicine will follow up on Abdominal US, viral hepatitis studies and repeat LFTs.  No further recommendations at this time. Please page the on-call SHREE for any intercurrent medical issues which arise.      Edith Alfaro PA-C  Hospitalist Service     Per progress note 7/12:      Brief Medicine Note     Medicine following peripherally for LFT monitoring.  LFTs today improved:  (182) and ALT 92 (129). T bili and ALP WNL.      Today's vital signs, medications, and nursing notes were reviewed.       /77 (BP Location: Left arm)   Pulse 110   Temp 97.8  F (36.6  C) (Oral)   Resp 16   Ht 1.829 m (6')   Wt 87.5 kg (192 lb 14.4 oz)   SpO2 95%   BMI 26.16 kg/m       Continue current plan of care. Will continue to trend CMP In 3 days to ensure continued downtrend.      Kim Harman PA-C  Hospitalist Service        3) Urinary incontinence and stool incontinence: RN staff first reported urinary incontinence over weekend 7/9, per chart review patient has history of intermittent urinary incontinence going back to at least 2016, continues to  have intermittent episodes of incontinence, continues to have both urinary and stool incontinence, placed IM consult for further assessment of need for additional work up given pts limitations as historian, appreciate assistance, no abnormalities noted on evaluation including bladder scan. Patient declining to wear incontinence briefs most days, staff continuing to encourage.    4) Unvaccinated for Covid-19, see interim history, attempted to discuss R/B/A of vaccination with patient, he did not demonstrate capacity as above, has no next of kin acting as surrogate decision maker, is awaiting guardianship, given patients age and medical co-morbidities the risks of receiving Covid vaccine are outweighed by the benefits, including patient is required to be vaccinated to be considered for discharge to some nursing home facilities which would be a more appropriate milieu and less restrictive environment for patient than locked inpatient psychiatric unit. Will ask Dr. Nickerson to provide second opinion regarding administration of Covid vaccine.       This note was created by undersigned using a Dragon dictation system. All typing errors or contextual distortion are unintentional and software inherent.     Disposition Plan   Reason for ongoing admission: is unable to care for self due to severe psychosis or mariusz and neurocognitive disorder  Discharge location: TBD, likely locked NH facility , unable to progress on discharge planning due to guardianship not being in place, see CTC notes  Discharge Medications: not ordered  Follow-up Appointments: not scheduled  Legal Status: Full MI commitment and Yanez     Entered by: Jeanette Dobbins on 8/19/2021 at 7:32 AM

## 2021-08-19 NOTE — PLAN OF CARE
BEHAVIORAL TEAM DISCUSSION    Participants:   Marylu Hanson, Lynn Singh RN    Progress:   This is day 50 of this admission.  Pt is oriented only to self.  Pt has cognitive impairment and is not able to care for himself.  Pt is incontinent of urine and feces. He is refusing to wear the depends undergarment.  He remains here while guardianship is being pursued.    Anticipated length of stay:   3 months    Continued Stay Criteria/Rationale:   Pt needs a structured placement to be discharged to.    Medical/Physical:   incontinence    Precautions:   Behavioral Orders   Procedures    Assault precautions    Code 2    Elopement precautions    Routine Programming     As clinically indicated    Single Room    Status 15     Every 15 minutes.     Plan:   Manage direct cares especially with incontinence  Look for guardianship so nursing home will accept the pt  Monitor bowel movements, concern about bowel obstruction  Give Covid vaccine after Dr. Nickerson also comments need for vaccine as a 2nd doctor    Rationale for change in precautions or plan: no changes

## 2021-08-19 NOTE — PLAN OF CARE
Problem: Cognitive Impairment (Psychotic Signs/Symptoms)  Goal: Optimal Cognitive Function (Psychotic Signs/Symptoms)  8/18/2021 2109 by Radha Donnelly RN  Outcome: No Change       Pt's affect is flat blunted.  Pt spent most of the evening in the lounge area watching tv, observed having conversation with himself.  Pt denies auditory and visual hallucinations.   No interaction with peers observed.  Pt was med compliant.  Pt took a shower in the day shift today.  Co-operative in the unit, ate dinner.

## 2021-08-19 NOTE — PLAN OF CARE
"  Problem: Behavioral Health Plan of Care  Goal: Plan of Care Review  Recent Flowsheet Documentation  Taken 8/19/2021 1000 by Lynn Singh RN  Plan of Care Reviewed With: patient  Patient Agreement with Plan of Care: agrees         NURSING ASSESSMENT: Patient remained in bed most of the shift.  Reports feeling \"tired.\"  Patient remained in bed til lunch.  Patient showered and staff changed patients linen.  Patient linen had brown smear of feces.  Patient affect is flat and blunted.  Speech is ramblings.  Thoughts are confused.  Patient is irritated with check in questions.  Quickly denies suicidal ideation.  Patient has not demonstrated any unsafe behaviors.         Appetite= Patient did not breakfast.  Ate 100% of lunch.      Sleep= Slept most in the morning.        EDUCATION: Patient refused.         Will continue with plan of care.      PRNS this shift         "

## 2021-08-19 NOTE — PLAN OF CARE
Assessment/Intervention/Current Symtoms and Care Coordination  -Chart review  -Pre round meeting with team  -Rounded with team, addressed patient needs/concerns  -Post round meeting with team  Current Symptoms include the following: Psychosis    Discharge Plan or Goal  Pending stabilization & development of a safe discharge plan.  Considerations include: locked nursing home    Barriers to Discharge  Patient requires further psychiatric stabilization due to current symptomology    Referral Status  locked nursing home    Legal Status  Patient is under MI commitment in [facility]

## 2021-08-20 LAB
BASOPHILS # BLD AUTO: 0.1 10E3/UL (ref 0–0.2)
BASOPHILS NFR BLD AUTO: 1 %
EOSINOPHIL # BLD AUTO: 1.1 10E3/UL (ref 0–0.7)
EOSINOPHIL NFR BLD AUTO: 16 %
ERYTHROCYTE [DISTWIDTH] IN BLOOD BY AUTOMATED COUNT: 13.2 % (ref 10–15)
HCT VFR BLD AUTO: 44.1 % (ref 40–53)
HGB BLD-MCNC: 15.4 G/DL (ref 13.3–17.7)
IMM GRANULOCYTES # BLD: 0 10E3/UL
IMM GRANULOCYTES NFR BLD: 0 %
LYMPHOCYTES # BLD AUTO: 1.5 10E3/UL (ref 0.8–5.3)
LYMPHOCYTES NFR BLD AUTO: 22 %
MCH RBC QN AUTO: 32.4 PG (ref 26.5–33)
MCHC RBC AUTO-ENTMCNC: 34.9 G/DL (ref 31.5–36.5)
MCV RBC AUTO: 93 FL (ref 78–100)
MONOCYTES # BLD AUTO: 0.7 10E3/UL (ref 0–1.3)
MONOCYTES NFR BLD AUTO: 10 %
NEUTROPHILS # BLD AUTO: 3.3 10E3/UL (ref 1.6–8.3)
NEUTROPHILS NFR BLD AUTO: 51 %
NRBC # BLD AUTO: 0 10E3/UL
NRBC BLD AUTO-RTO: 0 /100
PLATELET # BLD AUTO: 218 10E3/UL (ref 150–450)
RBC # BLD AUTO: 4.76 10E6/UL (ref 4.4–5.9)
WBC # BLD AUTO: 6.6 10E3/UL (ref 4–11)

## 2021-08-20 PROCEDURE — 250N000013 HC RX MED GY IP 250 OP 250 PS 637: Performed by: PHYSICIAN ASSISTANT

## 2021-08-20 PROCEDURE — 36415 COLL VENOUS BLD VENIPUNCTURE: CPT | Performed by: PSYCHIATRY & NEUROLOGY

## 2021-08-20 PROCEDURE — 85004 AUTOMATED DIFF WBC COUNT: CPT | Performed by: PSYCHIATRY & NEUROLOGY

## 2021-08-20 PROCEDURE — 124N000002 HC R&B MH UMMC

## 2021-08-20 PROCEDURE — 250N000013 HC RX MED GY IP 250 OP 250 PS 637: Performed by: PSYCHIATRY & NEUROLOGY

## 2021-08-20 PROCEDURE — 99232 SBSQ HOSP IP/OBS MODERATE 35: CPT | Performed by: PSYCHIATRY & NEUROLOGY

## 2021-08-20 RX ADMIN — CLOZAPINE 300 MG: 100 TABLET ORAL at 21:00

## 2021-08-20 RX ADMIN — DOCUSATE SODIUM 100 MG: 100 CAPSULE, LIQUID FILLED ORAL at 09:11

## 2021-08-20 RX ADMIN — POLYETHYLENE GLYCOL 3350 17 G: 17 POWDER, FOR SOLUTION ORAL at 09:11

## 2021-08-20 RX ADMIN — RIVASTIGMINE 1 PATCH: 4.6 PATCH TRANSDERMAL at 09:10

## 2021-08-20 RX ADMIN — LEVOTHYROXINE SODIUM 88 MCG: 88 TABLET ORAL at 09:11

## 2021-08-20 RX ADMIN — METOPROLOL TARTRATE 25 MG: 25 TABLET, FILM COATED ORAL at 09:11

## 2021-08-20 RX ADMIN — BENZTROPINE MESYLATE 1 MG: 1 TABLET ORAL at 21:00

## 2021-08-20 RX ADMIN — GUAIFENESIN 600 MG: 600 TABLET, EXTENDED RELEASE ORAL at 21:00

## 2021-08-20 RX ADMIN — SALMETEROL XINAFOATE 1 PUFF: 50 POWDER, METERED ORAL; RESPIRATORY (INHALATION) at 21:02

## 2021-08-20 RX ADMIN — MEMANTINE 10 MG: 5 TABLET ORAL at 21:00

## 2021-08-20 RX ADMIN — ATORVASTATIN CALCIUM 80 MG: 80 TABLET, FILM COATED ORAL at 21:00

## 2021-08-20 RX ADMIN — BENZTROPINE MESYLATE 1 MG: 1 TABLET ORAL at 09:11

## 2021-08-20 RX ADMIN — METOPROLOL TARTRATE 25 MG: 25 TABLET, FILM COATED ORAL at 21:00

## 2021-08-20 RX ADMIN — ASPIRIN 81 MG CHEWABLE TABLET 81 MG: 81 TABLET CHEWABLE at 09:11

## 2021-08-20 RX ADMIN — Medication 50 MCG: at 09:11

## 2021-08-20 RX ADMIN — DOCUSATE SODIUM 100 MG: 100 CAPSULE, LIQUID FILLED ORAL at 21:00

## 2021-08-20 RX ADMIN — GUAIFENESIN 600 MG: 600 TABLET, EXTENDED RELEASE ORAL at 09:10

## 2021-08-20 RX ADMIN — MEMANTINE 10 MG: 5 TABLET ORAL at 09:11

## 2021-08-20 ASSESSMENT — ACTIVITIES OF DAILY LIVING (ADL)
LAUNDRY: WITH SUPERVISION
DRESS: INDEPENDENT
HYGIENE/GROOMING: SHOWER
LAUNDRY: WITH SUPERVISION
ORAL_HYGIENE: PROMPTS
HYGIENE/GROOMING: PROMPTS
DRESS: INDEPENDENT
ORAL_HYGIENE: PROMPTS

## 2021-08-20 NOTE — PROGRESS NOTES
This writer has attempted to Get nasal pharyngeal swab from John for his Corvid 19 test that was ordered yesterday. John continues to refuse to aloow nurse to collect.

## 2021-08-20 NOTE — PLAN OF CARE
Assessment/Intervention/Current Symtoms and Care Coordination  -Chart review  -Pre round meeting with team  -Rounded with team, addressed patient needs/concerns  -Post round meeting with team  Current Symptoms include the following:   disorganization  Excessive sleeping  Irritabilty        Discharge Plan or Goal  Pending stabilization & development of a safe discharge plan.  Considerations include: locked nursing home    Barriers to Discharge  Patient requires further psychiatric stabilization due to current symptomology    Referral Status  guardianship and locked nursing home    Legal Status  Patient is under MI commitment in [facility]

## 2021-08-20 NOTE — PROGRESS NOTES
PSYCHIATRIC PROGRESS NOTE    Admission Date: 06/30/2021  Date of Service: 08/20/2021    The patient was seen, his chart reviewed, his case discussed with staff at the Team Meeting.  Observation on the unit revealed the patient to show the same diurnal pattern; he spends most of the morning in bed and is up and around for the rest of the day.  Upon interview he appears to be more verbal and more interactive but does not seem to be capable to comprehend the importance of Covid vaccinations which I tried to discuss with him.  I support consideration for Covid vaccination for this patient because of the high risk of serious complications if he contracted Covid-19.    This is a 55-year-old single white male with a lifelong history of mental illness, TBI, neurocognitive disorder and polysubstance abuse. He has a history of 3 MI commitments and one CD commitment. This time he also was committed and Jar[facility]. He had spent several months at [facility] and was transferred here after being seen by Dr. Wilks for psychiatric consultation. He was initially seen and followed by Dr. Morfin and has been followed by Dr. Dobbins since 07/08/21. He has been under my care since 07/14/21. His scheduled Risperdal was discontinued and he was started on Clozaril since his Yanez was amended. Subsequently I started him on Namenda and Exelon Patch. He seems to have tolerated his medications well     MEDICATIONS   Clozaril 300 mg at bedtime   Namenda 10 mg BID   Cogentin 1 mg BID   Exelon Patch 4.6 mg Q24H   Risperdal M-Tab 1 mg Q6H PRN   Melatonin 3 mg HS PRN     LABS: His CBC this morning was WNL except for rlrvated Absolute Eosinophils.    VS: T - 97.1, Pulse - 90, BP - 119/96, Resp - 16, SpO2 - 98%    MENTAL STATUS EXAMINATION   Revealed a normally built and normally developed 55-year-old white male appearing older than his stated age. He presented with involuntary tongue movement that may appear as if he was  talking to himself. His speech was more coherent and he articulated better.. He presented with paucity of ideas. His affect was blunted. He denied SI and HI. He was alert but oriented to self only. He denied hallucinations.. He had no insight into his problems and his judgement was impaired. He continued to present with marked impairment of his memory and cognition.     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   PLAN: Continue medications without change. Disposition TBD depending on developing a guardianship.     Juarez Nickerson MD

## 2021-08-20 NOTE — PLAN OF CARE
Problem: Behavioral Disturbance  Goal: Behavioral Disturbance  Description: Signs and symptoms of listed problems will be absent or manageable by discharge or transition of care.  8/19/2021 2216 by Laura Mason, RN  Outcome: No Change  Flowsheets (Taken 8/19/2021 2216)  Behavioral Disturbance Assessed: all  Behavioral Disturbance Present:   insight   thought process   psychomotor activity   sleep   speech   affect   mood   orientation   memory deficits  8/19/2021 2215 by Laura Mason, RN  Outcome: No Change    John has continued to be confused this evening. He was out watching television in the lounge for a short period of time. He ate dinner without difficulty. He retired early this evening. Attempted to get him t pull up on for incontinence and John resisted and started yelling profanities that he wasn't going to wear it. At times when watching television he does talk to himself. He is oriented to self. Ambulates independently. Resistant to any assistance with personal care. This writer needed to bring medications to him this evening. As he did not come to the medication window and request them as usual. He was medication compliant. He had no stated complaints of SI/SIB/AH/VH. Nursing to continue to support current plan of care.

## 2021-08-20 NOTE — PLAN OF CARE
Problem: Behavioral Health Plan of Care  Goal: Plan of Care Review  Recent Flowsheet Documentation  Taken 8/20/2021 0900 by Lynn Singh RN  Plan of Care Reviewed With: patient  Patient Agreement with Plan of Care: agrees       Patient remained in his room sleeping this shift.  Opens his eyes to speech.  He notified staff this morning that he was cold, his bed linen needed to be change and he needed clean clothes.  He showered with encouragement.  Allowed lab drawn with encouragement.  Patient affect is flat and blunted.  Speech is soft and limited response.  Thoughts show confusion.,  He is oriented only to self.  Patient remains AVSS.  Labs are unremarkable.  Appetite is appropriate.  Patient denies pain.  Continue with current plan of care.

## 2021-08-20 NOTE — PLAN OF CARE
Problem: Behavioral Health Plan of Care  Goal: Plan of Care Review  Outcome: No Change   Night Shift Summary (8/19/21 into 08/20/21)    Pt asleep at start of shift. Breathing quiet and unlabored.     Appears to have slept 7 hours.

## 2021-08-21 LAB — SARS-COV-2 RNA RESP QL NAA+PROBE: NEGATIVE

## 2021-08-21 PROCEDURE — 250N000013 HC RX MED GY IP 250 OP 250 PS 637: Performed by: PSYCHIATRY & NEUROLOGY

## 2021-08-21 PROCEDURE — 124N000002 HC R&B MH UMMC

## 2021-08-21 PROCEDURE — U0005 INFEC AGEN DETEC AMPLI PROBE: HCPCS | Performed by: PSYCHIATRY & NEUROLOGY

## 2021-08-21 PROCEDURE — 250N000013 HC RX MED GY IP 250 OP 250 PS 637: Performed by: PHYSICIAN ASSISTANT

## 2021-08-21 RX ADMIN — SALMETEROL XINAFOATE 1 PUFF: 50 POWDER, METERED ORAL; RESPIRATORY (INHALATION) at 20:26

## 2021-08-21 RX ADMIN — GUAIFENESIN 600 MG: 600 TABLET, EXTENDED RELEASE ORAL at 20:26

## 2021-08-21 RX ADMIN — CLOZAPINE 300 MG: 100 TABLET ORAL at 20:26

## 2021-08-21 RX ADMIN — GUAIFENESIN 600 MG: 600 TABLET, EXTENDED RELEASE ORAL at 08:59

## 2021-08-21 RX ADMIN — DOCUSATE SODIUM 100 MG: 100 CAPSULE, LIQUID FILLED ORAL at 08:59

## 2021-08-21 RX ADMIN — RIVASTIGMINE 1 PATCH: 4.6 PATCH TRANSDERMAL at 09:00

## 2021-08-21 RX ADMIN — SALMETEROL XINAFOATE 1 PUFF: 50 POWDER, METERED ORAL; RESPIRATORY (INHALATION) at 09:25

## 2021-08-21 RX ADMIN — METOPROLOL TARTRATE 25 MG: 25 TABLET, FILM COATED ORAL at 20:26

## 2021-08-21 RX ADMIN — BENZTROPINE MESYLATE 1 MG: 1 TABLET ORAL at 20:26

## 2021-08-21 RX ADMIN — BENZTROPINE MESYLATE 1 MG: 1 TABLET ORAL at 08:59

## 2021-08-21 RX ADMIN — MEMANTINE 10 MG: 5 TABLET ORAL at 20:27

## 2021-08-21 RX ADMIN — ASPIRIN 81 MG CHEWABLE TABLET 81 MG: 81 TABLET CHEWABLE at 08:59

## 2021-08-21 RX ADMIN — LEVOTHYROXINE SODIUM 88 MCG: 88 TABLET ORAL at 08:59

## 2021-08-21 RX ADMIN — POLYETHYLENE GLYCOL 3350 17 G: 17 POWDER, FOR SOLUTION ORAL at 08:59

## 2021-08-21 RX ADMIN — MEMANTINE 10 MG: 5 TABLET ORAL at 08:59

## 2021-08-21 RX ADMIN — METOPROLOL TARTRATE 25 MG: 25 TABLET, FILM COATED ORAL at 09:00

## 2021-08-21 RX ADMIN — ATORVASTATIN CALCIUM 80 MG: 80 TABLET, FILM COATED ORAL at 20:26

## 2021-08-21 RX ADMIN — DOCUSATE SODIUM 100 MG: 100 CAPSULE, LIQUID FILLED ORAL at 20:27

## 2021-08-21 RX ADMIN — Medication 50 MCG: at 09:00

## 2021-08-21 ASSESSMENT — ACTIVITIES OF DAILY LIVING (ADL)
DRESS: INDEPENDENT
ORAL_HYGIENE: INDEPENDENT
ORAL_HYGIENE: INDEPENDENT;PROMPTS
HYGIENE/GROOMING: INDEPENDENT
DRESS: INDEPENDENT
HYGIENE/GROOMING: INDEPENDENT
LAUNDRY: WITH SUPERVISION

## 2021-08-21 NOTE — PLAN OF CARE
Problem: Behavioral Health Plan of Care  Goal: Absence of New-Onset Illness or Injury  Outcome: No Change  Intervention: Identify and Manage Fall Risk  Recent Flowsheet Documentation  Taken 8/21/2021 1400 by Sandra Mohr RN  Safety Measures:   safety rounds completed   suicide check-in completed       Pt has spent this shift in the lounge watching television with peers and resting in his room. Pt slept in late and was irritable when awoken, ate his breakfast and took meds without issue, when attemping orthostatic vitals pt started to sway and reported dizziness. Pt was encouraged to sit back down and stand slowly when getting up, BP sitting 112/80, P 84, noted to be steady and balanced when ambulating in hallway and has been observed drinking fluids regularly. Pt is not social with peers, is noted to be rolling and moving his tongue, whispering and engaging in self talk when seated in [facility]. Pt often distracted when asked questions and is oriented to self only. Pt states he is at [place] and when writer informed him he is at [place] he was irritated and stated he has been telling everyone to visit him at [place]. Pt also asking writer to bring him to the store to buy more clothes. Pt denies pain or medication SE, no SI/SIB/HI observed or reported. Pt did not have any noted incontinence overnight, will continue to monitor and support plan of care.

## 2021-08-21 NOTE — PLAN OF CARE
"Night Shift Summary (8/20/21 into 08/21/21)    Pt in bed sleeping at start of shift. Breathing quiet and unlabored. Came out around 0115 asking about a \"Martinez\" and something about a movie. Was reoriented to the time of night and encouraged to get more sleep. Pt complied. Woke up again and came out to inquire about ordering pizza at 0450. Redirected back to his room. Appears to have slept 5.75 hours.    Assault and Elopement precautions in addition to Single Room order; any related events noted above.     Will continue to monitor and assess.       Problem: Behavioral Health Plan of Care  Goal: Absence of New-Onset Illness or Injury  Outcome: Improving   Remains safe on the unit.     Problem: Sleep Disturbance  Goal: Adequate Sleep/Rest  Outcome: Declining   Slept < 6 hours.     "

## 2021-08-21 NOTE — PROVIDER NOTIFICATION
08/20/21 2100   Rinku Risk Assessment   Sensory Perception 4-->no impairment   Moisture 3-->occasionally moist   Activity 4-->walks frequently   Mobility 4-->no limitation   Nutrition 3-->adequate   Friction and Shear 3-->no apparent problem   Rinku Score 21   Moisture Interventions Encourage regular toileting       Patient has history of NOC incontinence of bowel and bladder.  Patient is at risk for skin breakdown d/t incontinence. Patient refused the following interventions.  Depends at NOC and skin assessment.  Patient is encouraged to increase fluid intake.  Recommend for team to discuss and initiate a care plan.  Continue to monitor.

## 2021-08-22 PROCEDURE — 250N000013 HC RX MED GY IP 250 OP 250 PS 637: Performed by: PHYSICIAN ASSISTANT

## 2021-08-22 PROCEDURE — 250N000013 HC RX MED GY IP 250 OP 250 PS 637: Performed by: PSYCHIATRY & NEUROLOGY

## 2021-08-22 PROCEDURE — 124N000002 HC R&B MH UMMC

## 2021-08-22 RX ADMIN — Medication 50 MCG: at 09:19

## 2021-08-22 RX ADMIN — GUAIFENESIN 600 MG: 600 TABLET, EXTENDED RELEASE ORAL at 20:19

## 2021-08-22 RX ADMIN — ASPIRIN 81 MG CHEWABLE TABLET 81 MG: 81 TABLET CHEWABLE at 09:19

## 2021-08-22 RX ADMIN — LEVOTHYROXINE SODIUM 88 MCG: 88 TABLET ORAL at 09:20

## 2021-08-22 RX ADMIN — BENZTROPINE MESYLATE 1 MG: 1 TABLET ORAL at 09:20

## 2021-08-22 RX ADMIN — MEMANTINE 10 MG: 5 TABLET ORAL at 20:19

## 2021-08-22 RX ADMIN — ATORVASTATIN CALCIUM 80 MG: 80 TABLET, FILM COATED ORAL at 20:19

## 2021-08-22 RX ADMIN — CLOZAPINE 300 MG: 100 TABLET ORAL at 20:19

## 2021-08-22 RX ADMIN — GUAIFENESIN 600 MG: 600 TABLET, EXTENDED RELEASE ORAL at 09:19

## 2021-08-22 RX ADMIN — DOCUSATE SODIUM 100 MG: 100 CAPSULE, LIQUID FILLED ORAL at 09:20

## 2021-08-22 RX ADMIN — DOCUSATE SODIUM 100 MG: 100 CAPSULE, LIQUID FILLED ORAL at 20:20

## 2021-08-22 RX ADMIN — METOPROLOL TARTRATE 25 MG: 25 TABLET, FILM COATED ORAL at 20:19

## 2021-08-22 RX ADMIN — METOPROLOL TARTRATE 25 MG: 25 TABLET, FILM COATED ORAL at 09:20

## 2021-08-22 RX ADMIN — SALMETEROL XINAFOATE 1 PUFF: 50 POWDER, METERED ORAL; RESPIRATORY (INHALATION) at 20:21

## 2021-08-22 RX ADMIN — BENZTROPINE MESYLATE 1 MG: 1 TABLET ORAL at 20:20

## 2021-08-22 RX ADMIN — MEMANTINE 10 MG: 5 TABLET ORAL at 09:20

## 2021-08-22 RX ADMIN — RIVASTIGMINE 1 PATCH: 4.6 PATCH TRANSDERMAL at 09:20

## 2021-08-22 RX ADMIN — POLYETHYLENE GLYCOL 3350 17 G: 17 POWDER, FOR SOLUTION ORAL at 09:19

## 2021-08-22 ASSESSMENT — ACTIVITIES OF DAILY LIVING (ADL)
ORAL_HYGIENE: INDEPENDENT
LAUNDRY: WITH SUPERVISION
DRESS: SCRUBS (BEHAVIORAL HEALTH);INDEPENDENT
DRESS: SCRUBS (BEHAVIORAL HEALTH);INDEPENDENT
LAUNDRY: WITH SUPERVISION
HYGIENE/GROOMING: INDEPENDENT;PROMPTS
ORAL_HYGIENE: INDEPENDENT
HYGIENE/GROOMING: INDEPENDENT;PROMPTS

## 2021-08-22 NOTE — PLAN OF CARE
Problem: Behavioral Health Plan of Care  Goal: Adheres to Safety Considerations for Self and Others  Outcome: Improving     Problem: Cognitive Impairment (Psychotic Signs/Symptoms)  Goal: Optimal Cognitive Function (Psychotic Signs/Symptoms)  Outcome: No Change       Pt has spent this shift resting in his room, eating meals in the dining room and watching television in the lounge with peers. Pt remains on the periphery and is not social with his peers, responses are minimal when interacting with writer and pt noted to be labile and occasionally irritable. Pt affect is flat/blunted, oriented to self only, believes he is at [facility] and asked writer when he could get a cigarette or go out for a few drinks. Pt was reoriented to place and situation, evidence of understanding was minimal. Pt was incontinent of urine and stool overnight, co-RN assisted pt in putting on brief and jeanie cares/hygiene. Writer offered pt assistance again after lunch and pt was resistant but allowed writer to assist, no further incontinence observed at this time. Pt has been observed in the lounge engaging in self talk, occasionally smiling to self, TD tongue movements also noted. No observed or reported SI/SIB/HI at this time, will continue to monitor and support plan of care.

## 2021-08-22 NOTE — PLAN OF CARE
Night Shift Summary (8/21/21 into 08/22/21)    Pt in bed sleeping at start of shift. Breathing quiet and unlabored. Appears to have slept 6.5 hours.    Assault and Elopement precautions in addition to No Roommate order; any related events noted above.     Will continue to monitor and assess.       Problem: Behavioral Health Plan of Care  Goal: Absence of New-Onset Illness or Injury  Outcome: Improving     Problem: Sleep Disturbance  Goal: Adequate Sleep/Rest  Outcome: Improving

## 2021-08-23 PROCEDURE — 124N000002 HC R&B MH UMMC

## 2021-08-23 PROCEDURE — 99232 SBSQ HOSP IP/OBS MODERATE 35: CPT | Performed by: PSYCHIATRY & NEUROLOGY

## 2021-08-23 PROCEDURE — 250N000013 HC RX MED GY IP 250 OP 250 PS 637: Performed by: PSYCHIATRY & NEUROLOGY

## 2021-08-23 PROCEDURE — 250N000013 HC RX MED GY IP 250 OP 250 PS 637: Performed by: PHYSICIAN ASSISTANT

## 2021-08-23 RX ADMIN — Medication 50 MCG: at 08:12

## 2021-08-23 RX ADMIN — DOCUSATE SODIUM 100 MG: 100 CAPSULE, LIQUID FILLED ORAL at 22:25

## 2021-08-23 RX ADMIN — LEVOTHYROXINE SODIUM 88 MCG: 88 TABLET ORAL at 08:12

## 2021-08-23 RX ADMIN — GUAIFENESIN 600 MG: 600 TABLET, EXTENDED RELEASE ORAL at 22:24

## 2021-08-23 RX ADMIN — BENZTROPINE MESYLATE 1 MG: 1 TABLET ORAL at 22:24

## 2021-08-23 RX ADMIN — ASPIRIN 81 MG CHEWABLE TABLET 81 MG: 81 TABLET CHEWABLE at 08:12

## 2021-08-23 RX ADMIN — DOCUSATE SODIUM 100 MG: 100 CAPSULE, LIQUID FILLED ORAL at 08:12

## 2021-08-23 RX ADMIN — RIVASTIGMINE 1 PATCH: 4.6 PATCH TRANSDERMAL at 08:13

## 2021-08-23 RX ADMIN — MEMANTINE 10 MG: 5 TABLET ORAL at 08:12

## 2021-08-23 RX ADMIN — METOPROLOL TARTRATE 25 MG: 25 TABLET, FILM COATED ORAL at 08:12

## 2021-08-23 RX ADMIN — CLOZAPINE 300 MG: 100 TABLET ORAL at 22:24

## 2021-08-23 RX ADMIN — BENZTROPINE MESYLATE 1 MG: 1 TABLET ORAL at 08:12

## 2021-08-23 RX ADMIN — ATORVASTATIN CALCIUM 80 MG: 80 TABLET, FILM COATED ORAL at 22:24

## 2021-08-23 RX ADMIN — GUAIFENESIN 600 MG: 600 TABLET, EXTENDED RELEASE ORAL at 08:12

## 2021-08-23 RX ADMIN — POLYETHYLENE GLYCOL 3350 17 G: 17 POWDER, FOR SOLUTION ORAL at 08:13

## 2021-08-23 RX ADMIN — SALMETEROL XINAFOATE 1 PUFF: 50 POWDER, METERED ORAL; RESPIRATORY (INHALATION) at 22:25

## 2021-08-23 RX ADMIN — MEMANTINE 10 MG: 5 TABLET ORAL at 22:24

## 2021-08-23 ASSESSMENT — ACTIVITIES OF DAILY LIVING (ADL)
LAUNDRY: WITH SUPERVISION
LAUNDRY: UNABLE TO COMPLETE
DRESS: SCRUBS (BEHAVIORAL HEALTH);INDEPENDENT
ORAL_HYGIENE: INDEPENDENT
ORAL_HYGIENE: INDEPENDENT
HYGIENE/GROOMING: INDEPENDENT;PROMPTS
HYGIENE/GROOMING: INDEPENDENT;PROMPTS
DRESS: INDEPENDENT

## 2021-08-23 NOTE — PLAN OF CARE
"  Problem: Cognitive Impairment (Psychotic Signs/Symptoms)  Goal: Optimal Cognitive Function (Psychotic Signs/Symptoms)  8/22/2021 2046 by Sandra Mohr RN  Outcome: No Change       Pt has spent this shift in his room resting and watching TV in the lounge with peers. Pt is remains withdrawn and on the periphery when in common areas of unit. Pt was distracted and unable to focus when first approached by writer to do check in, he was staring into space having a full conversation with himself and appeared to be responding. Later in the evening when writer attempted to check in with pt again he denied SI/SIB/HI and when asked about A/VH he stated \"yeah, ghosts\" and then laughed. Pt remains flat in affect, less irritable this shift. Pt stated he wants to \"go home and get the whiskey he left there.\" Pt reports his mood is \"alright,\" denies pain or medication SE. Pt remains focused on discharge and is confused, oriented to self only. Pt stated he was leaving with Obed dhillon when heading to bed. Pt allowed writer to look at his brief this evening and it did not appear soiled, denied wanting a urinal at bedside. Will continue to monitor and support plan of care.   "

## 2021-08-23 NOTE — PLAN OF CARE
Problem: Sleep Disturbance  Goal: Adequate Sleep/Rest  Outcome: No Change     Night shift summary: Sunday 8/22 into Monday 8/23     John had an uneventful night. He appears to have slept 6.75 hours this shift. Pt did not leave his room overnight; no snacks or PRN medications given.      Pt remains on elopement and precautions; has an order for a private room.

## 2021-08-23 NOTE — PLAN OF CARE
Assessment/Intervention/Current Symtoms and Care Coordination  -Chart review  -Pre round meeting with team  -Rounded with team, addressed patient needs/concerns  -Post round meeting with team  Current Symptoms include the following: Psychosis, disorganization and confusion    Discharge Plan or Goal  Pending stabilization & development of a safe discharge plan.  Considerations include: Locked nursing home    Barriers to Discharge  No guardianship is available to this patient    Referral Status  guardianship and locked nursing home    Legal Status  Patient is under MI commitment in [facility]

## 2021-08-23 NOTE — PROGRESS NOTES
"[facility], [place]   Psychiatric Progress Note  Hospital Day: 54        Interim History:   The patient's care was discussed with the treatment team during the daily team meeting and/or staff's chart notes were reviewed.  Staff report patient was visible in the milieu, continues to have incontinence, resistant to cares and wearing briefs, only oriented to self, observed talking to self, TD movements continue to be noted, taking medications as prescribed, remains irritable, no acute events over weekend.     Upon interview, patient was sleeping, awoke to voice, reports his mood was \"fine\" over the weekend. Denies depression or anxiety. Denies SI or HI. Denies AVH or paranoia. Again attempted to challenge patient on observations by staff of him talking to self and he continued to deny AH. Tolerating medications, is \"tired\" but denies other side effects. Continues to deny noticing TD movements. Expressed concern again around incontinence issues and not wearing incontinence briefs and how this could lead to skin breakdown, etc and encouraged hygiene and showers, he responded he would do this \"later\". Reviewed recommendation again that patient receive Covid vaccine and that Dr Nickerson agreed with this recommendation. John reports he \"already got it\" and writer discussed that chart does not reflect this, he did agree to allow vaccine. Continued to be oriented only to self, not date, place or situation. No additional concerns.          Medications:       aspirin  81 mg Oral Daily     atorvastatin  80 mg Oral At Bedtime     benztropine  1 mg Oral BID     cholecalciferol  1,250 mcg Oral Q7 Days     cloZAPine  300 mg Oral At Bedtime     docusate sodium  100 mg Oral BID     guaiFENesin  600 mg Oral BID     levothyroxine  88 mcg Oral Daily     memantine  10 mg Oral BID     metoprolol tartrate  25 mg Oral BID     polyethylene glycol  17 g Oral Daily     rivastigmine  1 patch Transdermal Daily     " "rivastigmine   Transdermal Q8H     salmeterol  1 puff Inhalation BID     vitamin D3  50 mcg Oral Daily          Allergies:     Allergies   Allergen Reactions     Ibuprofen      Latex           Labs:     Recent Results (from the past 48 hour(s))   Asymptomatic COVID-19 Virus (Coronavirus) by PCR Nasopharyngeal    Collection Time: 08/21/21  3:50 PM    Specimen: Nasopharyngeal; Swab   Result Value Ref Range    SARS CoV2 PCR Negative Negative          Psychiatric Examination:     /84   Pulse 97   Temp 96.8  F (36  C) (Oral)   Resp 16   Ht 1.829 m (6')   Wt 89.4 kg (197 lb 1.6 oz)   SpO2 97%   BMI 26.73 kg/m    Weight is 197 lbs 1.6 oz  Body mass index is 26.73 kg/m .    Orthostatic Vitals       Most Recent      Sitting Orthostatic /93 08/22 0900    Sitting Orthostatic Pulse (bpm) 97 08/22 0900    Standing Orthostatic /83 08/22 0900    Standing Orthostatic Pulse (bpm) 94 08/22 0900        Appearance: awake, alert and untidy  Attitude: somewhat cooperative  Eye Contact:  fair   Mood:  \"fine\", irritable  Affect:  mood congruent and intensity is flat  Speech:  soft volume, raspy ,dysarthric   Language: fluent and intact in English  Psychomotor, Gait, Musculoskeletal:  no evidence of dystonia, or tics, minimal movements of buccolingual dyskinesia present during interview today  Thought Process: disorganized  Associations:  no loose associations  Thought Content:  no evidence of suicidal ideation or homicidal ideation and continues to be delusional and observed responding to internal stimuli at times  Insight:  limited  Judgement:  limited  Oriented to:  person  Attention Span and Concentration:  limited  Recent and Remote Memory:  limited  Fund of Knowledge:  delayed    Clinical Global Impressions  First:  Considering your total clinical experience with this particular patient population, how severe are the patient's symptoms at this time?: 7 (07/01/21 0630)  Compared to the patient's condition at " the START of treatment, this patient's condition is: 4 (07/01/21 0630)  Most recent:  Considering your total clinical experience with this particular patient population, how severe are the patient's symptoms at this time?: 7 (08/12/21 1836)  Compared to the patient's condition at the START of treatment, this patient's condition is: 4 (08/12/21 1836)           Precautions:     Behavioral Orders   Procedures     Assault precautions     Code 2     Elopement precautions     Routine Programming     As clinically indicated     Single Room     Status 15     Every 15 minutes.          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   Stimulant use disorder, in remission.   Transaminitis, possibly medication induced   Hx of CVA  Vit D deficiency  Hypertension  COPD  Hx of infective endocarditis with severe mitral and aortic regurgitation s/p biprostehtic valve replacement 2015  Hx of HFrEF now recovered  Tobacco use disorder  Non-severe malnutrition   Urinary incontinence         Assessment & Plan:   Assessment and hospital summary:  The patient is a 54yo male with a history of schizophrenia and TBI and multiple medical co-morbidities as above who was admitted after being transferred from [facility] medical [facility] after a nearly year-long stay. Is currently under commitment with [facility] recently amended to include Clozaril. While at [facility] was noted to have ongoing agitation and frequently attempting to elope from unit requiring 1:1 staffing for safety. He was started on 1:1 staff upon transfer, this was discontinued as patient has been more cooperative without elopement attempts. IM consult completed on admission and continued to follow due to elevated LFTs. Haldol and VPA discontinued due to LFTs. Cross titration completed from risperidone to  clozaril after Yanez amended. EPSE/TD movements noted, have appeared stable over past few weeks.     Psychiatric treatment/inteventions:  Medications:   -continue Clozaril 300mg at bedtime for psychosis and agitation. Will not adjust dose further at this time, movements consistent with TD noted, and level in therapeutic range   -continue benztropine 1mg BID for EPSE  -continue memantine 5mg daily for neurocognitive disorder  -continue risperdal 1mg Q6H PRN agitation   -continue lorazepam 0.5-1.0mg Q4H PRN anxiety or severe agitation  -continue rivastigmine patch started 8/2 to target neurocognitive disorder     Laboratory/Imaging: weekly WBC and ANC for clozapine monitoring continues, last ANC 8/20 was 3.3 next ANC 8/27; weekly covid negative 8/21     Patient will be treated in therapeutic milieu with appropriate individual and group therapies as described.     Medical treatment/interventions:  Medical concerns:   1) IM consult placed on admission, per consult note on 7/1/21:   Diagnoses:    #Major neurocognitive disorder dt hx of TBI and alcohol use disorder  #Schizophrenia  #Under commitment  Had been residing in group home prior to admission.  Brought to hospital for evaluation of aggressive behaviors. Group home now unwilling to take him back. Has had numerous CODE 21s called this stay. Seen by psych, meds adjusted. Is currently under civil commitment and court hold through [facility] until 7/31/21. Please see recent discharge summary for details on 6/30/20201.   -Management per psychiatry      #Vitamin D Deficiency- Vit D level 16. Has been in hospital since Sept 2020. Started on cholecalciferol 91544 units on 6/17. Continue high-dose weekly replacement for 6 weeks total. Following high dose replacement recommend starting vitamin D 2000 international unit(s) daily replacement (start date 8/5/2021).      #Possible Tardive Dyskinesia vs EPSE- Per psych assessment on 4/1/21, noted to have possible tardive  dyskinesia vs extrapyramidal side effects of meds.  - At that time, recommended to increase Cogentin to 1mg BID and add vitamin E 800 international unit(s) daily.      #Hx of CVA - hx of basal ganglia stroke in 2015. No focal neuro deficits aside from cognitive dysfunction as above.      #Hyperlipidemia - Continue PTA ASA 81 mg daily and atorvastatin 80 mg daily.      #COPD - Not O2 dependent. Continue PTA on salmeterol and albuterol PRN. Patient occasionally refusing inhalers, encourage compliance. Mucinex added for intermittent cough at [facility].      #Hypothyroidism- TSH 3.18 on 9/2020. Repeat recently on 5/29/2021 slightly elevated 5.18, with no T4 . Continue PTA levothyroxine 88 mcg daily. Repeat TSH with reflex to T4.      #Hx of infective endocarditis with severe mitral and aortic regurgitation S/P bioprosthetic valve replacement (10/2015)  #Hx of HFrEF, now recovered, not in acute exacerbation  Follows with Dr. Dockery of Heart and Vascular Cardiology, last seen in 1/2020. Echocardiogram in 5/2016 with EF 50%, no evidence for prosthetic mitral and aortic valve dysfunction.   - Follow-up with outpatient Cardiology upon dismissal from the hospital (on every 2 year follow-ups).  - Continue ASA 81 mg daily      #Tobacco use disorder- Using nicotine patch and PRN gum.     #Non-severe malnutrition- Nutrition consulted and following at OSH.      Ordered CMP, CBC, and TSH with reflex.  No further recommendations at this time. Please page the on-call SHREE for any intercurrent medical issues which arise.      ADDENDUM: TSH normal. CBC normal. ALT elevated at 174, with unsatisfactory AST. Per chart review, LFTs last checked in December with  and . T bili and alk phos.  Reviewed with pharmacy, and possible medications causing would be Haldol, depakote, and statin. Discussed with psych who will taper haldol and Depakote. Holding statin, will check LDL.  Will repeat LFTs in AM, and check CK. Medicine will  follow up on repeat LFTs, and CK.      Edith Alfaro PA-C  Hospitalist Service     2) Per updated progress note by IM 7/4:   A/P:  #Transaminitis - slowly rising LFTs since December. Asymptomatic. Reviewed with pharmacy, and possible medications causing would be Haldol, depakote, and statin. Discussed with psych who will taper haldol and Depakote. Holding statin, (total cholesterol 91, with LDL 41). Repeat LFTs continue to rise slightly.   -Abdominal US tomorrow (NPO after midnight)  -Hepatitis B surface ab and agn and Hep C ab   -Repeat LFTs in 3 days     Medicine will follow up on Abdominal US, viral hepatitis studies and repeat LFTs.  No further recommendations at this time. Please page the on-call SHREE for any intercurrent medical issues which arise.      Edith Alfaro PA-C  Hospitalist Service     Per progress note 7/12:      Brief Medicine Note     Medicine following peripherally for LFT monitoring.  LFTs today improved:  (182) and ALT 92 (129). T bili and ALP WNL.      Today's vital signs, medications, and nursing notes were reviewed.       /77 (BP Location: Left arm)   Pulse 110   Temp 97.8  F (36.6  C) (Oral)   Resp 16   Ht 1.829 m (6')   Wt 87.5 kg (192 lb 14.4 oz)   SpO2 95%   BMI 26.16 kg/m       Continue current plan of care. Will continue to trend CMP In 3 days to ensure continued downtrend.      Kim Harman PA-C  Hospitalist Service        3) Urinary incontinence and stool incontinence: RN staff first reported urinary incontinence over weekend 7/9, per chart review patient has history of intermittent urinary incontinence going back to at least 2016, continues to have intermittent episodes of incontinence, continues to have both urinary and stool incontinence, placed IM consult for further assessment of need for additional work up given pts limitations as historian, appreciate assistance, no abnormalities noted on evaluation including bladder scan. Patient declining to  wear incontinence briefs most days, staff continuing to encourage.    4) Unvaccinated for Covid-19, see interim history of note 8/19 by writer, attempted to discuss R/B/A of vaccination with patient, he did not demonstrate capacity, has no next of kin acting as surrogate decision maker, is awaiting guardianship, given patients age and medical co-morbidities the risks of receiving Covid vaccine are outweighed by the benefits, including patient is required to be vaccinated to be considered for discharge to some nursing home facilities which would be a more appropriate milieu and less restrictive environment for patient than locked inpatient psychiatric unit. Dr. Nickerson provided second opinion regarding administration of Covid vaccine on 8.20 and agrees with recommendation.   -Covid vaccine order placed 8/23     This note was created by undersigned using a Dragon dictation system. All typing errors or contextual distortion are unintentional and software inherent.     Disposition Plan   Reason for ongoing admission: is unable to care for self due to severe psychosis or mariusz and neurocognitive disorder  Discharge location: TBD, likely locked NH facility , unable to progress on discharge planning due to guardianship not being in place, see CTC notes  Discharge Medications: not ordered  Follow-up Appointments: not scheduled  Legal Status: Full MI commitment and Yanez     Entered by: Jeanette Dobbins on 8/23/2021 at 7:07 AM

## 2021-08-23 NOTE — PROGRESS NOTES
"Staff noted that pt was awake in room and headed to the bathroom during 0200 rounds. Staff felt pt's pants and they were wet. Bedding was changed and writer handed pt a pair of clean scrubs and an incontinence brief. Pt didn't continue into the bathroom, so writer asked if he would like the bathroom light on. Pt paused and replied, \"yeah\". Writer turned the light on, and watched pt enter the bathroom. Writer wished pt a good night sleep and left the room.   "

## 2021-08-23 NOTE — PLAN OF CARE
Problem: Cognitive Impairment (Psychotic Signs/Symptoms)  Goal: Optimal Cognitive Function (Psychotic Signs/Symptoms)  Outcome: No Change       Pt has spent this shift resting in his room, out for meals briefly and watched TV in lounge with peers for a short while. Pt is not social and remains withdrawn, appears confused and distracted. Pt oriented to self only, continues to make comments about [facility] and going outside for a smoke. Pt changed his clothing today, would not allow writer to assess skin on his bottom or jeanie area but clothing did not appear soiled and bedding was not soiled that writer was able to observe. Pt denied SI/SIB/HI as well as A/VH but partway through conversation pt began to stare at wall over writers shoulder and began speaking and responding to internal stimuli, not able to be redirected to back to conversation with writer at this time. Pt denies pain or medication SE at this time, noted TD. Will continue to monitor and support plan of care.

## 2021-08-24 PROCEDURE — 124N000002 HC R&B MH UMMC

## 2021-08-24 PROCEDURE — 250N000013 HC RX MED GY IP 250 OP 250 PS 637: Performed by: PSYCHIATRY & NEUROLOGY

## 2021-08-24 PROCEDURE — 99232 SBSQ HOSP IP/OBS MODERATE 35: CPT | Performed by: PSYCHIATRY & NEUROLOGY

## 2021-08-24 PROCEDURE — 250N000011 HC RX IP 250 OP 636: Performed by: PSYCHIATRY & NEUROLOGY

## 2021-08-24 PROCEDURE — 250N000013 HC RX MED GY IP 250 OP 250 PS 637: Performed by: PHYSICIAN ASSISTANT

## 2021-08-24 RX ORDER — DIPHENHYDRAMINE HCL 50 MG
50 CAPSULE ORAL
Status: ACTIVE | OUTPATIENT
Start: 2021-08-24 | End: 2021-08-27

## 2021-08-24 RX ORDER — EPINEPHRINE 0.3 MG/.3ML
0.3 INJECTION SUBCUTANEOUS
Status: ACTIVE | OUTPATIENT
Start: 2021-08-24 | End: 2021-08-27

## 2021-08-24 RX ORDER — DIPHENHYDRAMINE HYDROCHLORIDE 50 MG/ML
50 INJECTION INTRAMUSCULAR; INTRAVENOUS
Status: ACTIVE | OUTPATIENT
Start: 2021-08-24 | End: 2021-08-27

## 2021-08-24 RX ADMIN — ASPIRIN 81 MG CHEWABLE TABLET 81 MG: 81 TABLET CHEWABLE at 09:01

## 2021-08-24 RX ADMIN — BENZTROPINE MESYLATE 1 MG: 1 TABLET ORAL at 21:07

## 2021-08-24 RX ADMIN — GUAIFENESIN 600 MG: 600 TABLET, EXTENDED RELEASE ORAL at 09:01

## 2021-08-24 RX ADMIN — CLOZAPINE 300 MG: 100 TABLET ORAL at 21:06

## 2021-08-24 RX ADMIN — MEMANTINE 10 MG: 5 TABLET ORAL at 09:01

## 2021-08-24 RX ADMIN — DOCUSATE SODIUM 100 MG: 100 CAPSULE, LIQUID FILLED ORAL at 21:07

## 2021-08-24 RX ADMIN — POLYETHYLENE GLYCOL 3350 17 G: 17 POWDER, FOR SOLUTION ORAL at 09:00

## 2021-08-24 RX ADMIN — SALMETEROL XINAFOATE 1 PUFF: 50 POWDER, METERED ORAL; RESPIRATORY (INHALATION) at 09:01

## 2021-08-24 RX ADMIN — Medication 50 MCG: at 09:01

## 2021-08-24 RX ADMIN — SALMETEROL XINAFOATE 1 PUFF: 50 POWDER, METERED ORAL; RESPIRATORY (INHALATION) at 21:11

## 2021-08-24 RX ADMIN — ATORVASTATIN CALCIUM 80 MG: 80 TABLET, FILM COATED ORAL at 21:06

## 2021-08-24 RX ADMIN — GUAIFENESIN 600 MG: 600 TABLET, EXTENDED RELEASE ORAL at 21:07

## 2021-08-24 RX ADMIN — MEMANTINE 10 MG: 5 TABLET ORAL at 21:07

## 2021-08-24 RX ADMIN — METOPROLOL TARTRATE 25 MG: 25 TABLET, FILM COATED ORAL at 21:07

## 2021-08-24 RX ADMIN — LEVOTHYROXINE SODIUM 88 MCG: 88 TABLET ORAL at 09:01

## 2021-08-24 RX ADMIN — JNJ-78436735 0.5 ML: 50000000000 SUSPENSION INTRAMUSCULAR at 13:06

## 2021-08-24 RX ADMIN — RIVASTIGMINE 1 PATCH: 4.6 PATCH TRANSDERMAL at 09:01

## 2021-08-24 RX ADMIN — DOCUSATE SODIUM 100 MG: 100 CAPSULE, LIQUID FILLED ORAL at 09:01

## 2021-08-24 RX ADMIN — BENZTROPINE MESYLATE 1 MG: 1 TABLET ORAL at 09:00

## 2021-08-24 ASSESSMENT — ACTIVITIES OF DAILY LIVING (ADL)
DRESS: INDEPENDENT
DRESS: INDEPENDENT
LAUNDRY: WITH SUPERVISION
ORAL_HYGIENE: PROMPTS
LAUNDRY: WITH SUPERVISION
HYGIENE/GROOMING: PROMPTS
ORAL_HYGIENE: PROMPTS
HYGIENE/GROOMING: PROMPTS

## 2021-08-24 NOTE — PLAN OF CARE
Patient slept in this morning.  Got up for breakfast with encouragement.  Patient affect is flat and blunted.  Mood is calm.  Patient occasionally makes irritable statements followed by compliance with asked to change positions when resting.    Patient has an order for the Covid Vaccine.  The Behavioral Med Flyer and staff nurse approached the patient to receive the vaccination.  The patient reports he already was vaccinated.  The [facility] and Immunization record did not show the patient already receiving the vaccination.  Patient was given the vaccination and monitored by the Beh Med Flyer for symptoms.  Patient is absent of symptoms.      Patient has a history of NOC incontinence and responding to internal stimuli.  Patient did not have an incontinence episode last NOC.   Patient denies thoughts SI, SIB, HI and hallucinations.  Patients continues to show safe behaviors and follow directions.  In the afternoon, the patient remained in the lounge to watch tv with peers.  Continue with plan of care.

## 2021-08-24 NOTE — PLAN OF CARE
Problem: Sleep Disturbance  Goal: Adequate Sleep/Rest  Outcome: No Change  Received patient sleeping in his room at start of this shift with non labored respirations. No requests for prn. Continues on elopement, assault and single room precautions. Pt was observed to have slept for 7 hours this night.

## 2021-08-24 NOTE — PROGRESS NOTES
Pt was given the [employer]enny [employer]enny COVID Vaccine in his left arm . Pt was monitor for 30 Minutes with no reaction noted . Pt refused education sheets about  COVID Vaccine

## 2021-08-24 NOTE — PROGRESS NOTES
"[facility], [place]   Psychiatric Progress Note  Hospital Day: 55        Interim History:   The patient's care was discussed with the treatment team during the daily team meeting and/or staff's chart notes were reviewed.  Staff report spent majority of evening shift in milieu, denying SI and AVH, still appears internally preoccupied and talking to self, taking medications as prescribed, RN reached out to medical flyer re: covid vaccine; no acute events overnight.     Upon interview, patient was in bed sleeping, he had not gotten up for breakfast yet but stated he planned to go down to dining area \"soon\". Eating and sleeping without difficulty. Mood is \"tired\", denies SI or HI, denies AVH. He asked about being able to leave, again reviewed team is working on disposition. He did not make any statements today about having mansions [place] over the [place]. Reviewed the plan again to have him receive the covid vaccine as recommended by writer and Dr. Nickerson, he stated \"okay\". He was oriented to self only. Writer provided update they will be going on vacation and another provider will be seeing him tomorrow. No additional concerns.          Medications:       aspirin  81 mg Oral Daily     atorvastatin  80 mg Oral At Bedtime     benztropine  1 mg Oral BID     cholecalciferol  1,250 mcg Oral Q7 Days     cloZAPine  300 mg Oral At Bedtime     docusate sodium  100 mg Oral BID     guaiFENesin  600 mg Oral BID     levothyroxine  88 mcg Oral Daily     memantine  10 mg Oral BID     metoprolol tartrate  25 mg Oral BID     polyethylene glycol  17 g Oral Daily     rivastigmine  1 patch Transdermal Daily     rivastigmine   Transdermal Q8H     salmeterol  1 puff Inhalation BID     vitamin D3  50 mcg Oral Daily          Allergies:     Allergies   Allergen Reactions     Ibuprofen      Latex           Labs:     No results found for this or any previous visit (from the past 48 hour(s)).       Psychiatric Examination: " "    BP 93/68   Pulse 83   Temp (!) 96.7  F (35.9  C) (Oral)   Resp 16   Ht 1.829 m (6')   Wt 89.4 kg (197 lb 1.6 oz)   SpO2 96%   BMI 26.73 kg/m    Weight is 197 lbs 1.6 oz  Body mass index is 26.73 kg/m .    Orthostatic Vitals     None        Appearance: awake, alert and untidy  Attitude: somewhat cooperative  Eye Contact:  fair   Mood:  \"tired\", irritable  Affect:  mood congruent and intensity is flat  Speech:  soft volume, raspy ,dysarthric   Language: fluent and intact in English  Psychomotor, Gait, Musculoskeletal:  no evidence of dystonia, or tics, continues to have movements of buccolingual dyskinesia present during interview  Thought Process: disorganized  Associations:  no loose associations  Thought Content:  no evidence of suicidal ideation or homicidal ideation and continues to be delusional and observed responding to internal stimuli at times  Insight:  limited  Judgement:  limited  Oriented to:  person  Attention Span and Concentration:  limited  Recent and Remote Memory:  limited  Fund of Knowledge:  delayed    Clinical Global Impressions  First:  Considering your total clinical experience with this particular patient population, how severe are the patient's symptoms at this time?: 7 (07/01/21 0630)  Compared to the patient's condition at the START of treatment, this patient's condition is: 4 (07/01/21 0630)  Most recent:  Considering your total clinical experience with this particular patient population, how severe are the patient's symptoms at this time?: 7 (08/12/21 1836)  Compared to the patient's condition at the START of treatment, this patient's condition is: 4 (08/12/21 1836)           Precautions:     Behavioral Orders   Procedures     Assault precautions     Code 2     Elopement precautions     Routine Programming     As clinically indicated     Single Room     Status 15     Every 15 minutes.          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   Stimulant use disorder, in remission.   Transaminitis, possibly medication induced   Hx of CVA  Vit D deficiency  Hypertension  COPD  Hx of infective endocarditis with severe mitral and aortic regurgitation s/p biprostehtic valve replacement 2015  Hx of HFrEF now recovered  Tobacco use disorder  Non-severe malnutrition   Urinary incontinence         Assessment & Plan:   Assessment and hospital summary:  The patient is a 54yo male with a history of schizophrenia and TBI and multiple medical co-morbidities as above who was admitted after being transferred from [facility] medical [facility] after a nearly year-long stay. Is currently under commitment with Yanez recently amended to include Clozaril. While at [facility] was noted to have ongoing agitation and frequently attempting to elope from unit requiring 1:1 staffing for safety. He was started on 1:1 staff upon transfer, this was discontinued as patient has been more cooperative without elopement attempts. IM consult completed on admission and continued to follow due to elevated LFTs. Haldol and VPA discontinued due to LFTs. Cross titration completed from risperidone to clozaril after Yanez amended. EPSE/TD movements noted, have appeared stable over past few weeks. Patient has continued to have disorganization and active psychosis symptoms which appear to be at patients baseline. Team continues to work on disposition which barriers include patient has no guardian or next of kin willing to act as surrogate decision maker, see [facility] notes for complete details.     Psychiatric treatment/inteventions:  Medications:   -continue Clozaril 300mg at bedtime for psychosis and agitation. Will not adjust dose further at this time, movements consistent with TD noted, and level in therapeutic range   -continue benztropine 1mg BID for  EPSE  -continue memantine 5mg daily for neurocognitive disorder  -continue risperdal 1mg Q6H PRN agitation   -continue lorazepam 0.5-1.0mg Q4H PRN anxiety or severe agitation  -continue rivastigmine patch started 8/2 to target neurocognitive disorder     Laboratory/Imaging: weekly WBC and ANC for clozapine monitoring continues, last ANC 8/20 was 3.3 next ANC 8/27; weekly covid negative 8/21     Patient will be treated in therapeutic milieu with appropriate individual and group therapies as described.     Medical treatment/interventions:  Medical concerns:   1) IM consult placed on admission, per consult note on 7/1/21:   Diagnoses:    #Major neurocognitive disorder dt hx of TBI and alcohol use disorder  #Schizophrenia  #Under commitment  Had been residing in group home prior to admission.  Brought to hospital for evaluation of aggressive behaviors. Group home now unwilling to take him back. Has had numerous CODE 21s called this stay. Seen by psych, meds adjusted. Is currently under civil commitment and court hold through [facility] until 7/31/21. Please see recent discharge summary for details on 6/30/20201.   -Management per psychiatry      #Vitamin D Deficiency- Vit D level 16. Has been in hospital since Sept 2020. Started on cholecalciferol 47315 units on 6/17. Continue high-dose weekly replacement for 6 weeks total. Following high dose replacement recommend starting vitamin D 2000 international unit(s) daily replacement (start date 8/5/2021).      #Possible Tardive Dyskinesia vs EPSE- Per psych assessment on 4/1/21, noted to have possible tardive dyskinesia vs extrapyramidal side effects of meds.  - At that time, recommended to increase Cogentin to 1mg BID and add vitamin E 800 international unit(s) daily.      #Hx of CVA - hx of basal ganglia stroke in 2015. No focal neuro deficits aside from cognitive dysfunction as above.      #Hyperlipidemia - Continue PTA ASA 81 mg daily and atorvastatin 80 mg  daily.      #COPD - Not O2 dependent. Continue PTA on salmeterol and albuterol PRN. Patient occasionally refusing inhalers, encourage compliance. Mucinex added for intermittent cough at [facility].      #Hypothyroidism- TSH 3.18 on 9/2020. Repeat recently on 5/29/2021 slightly elevated 5.18, with no T4 . Continue PTA levothyroxine 88 mcg daily. Repeat TSH with reflex to T4.      #Hx of infective endocarditis with severe mitral and aortic regurgitation S/P bioprosthetic valve replacement (10/2015)  #Hx of HFrEF, now recovered, not in acute exacerbation  Follows with Dr. Dockery of [facility], last seen in 1/2020. Echocardiogram in 5/2016 with EF 50%, no evidence for prosthetic mitral and aortic valve dysfunction.   - Follow-up with outpatient Cardiology upon dismissal from the hospital (on every 2 year follow-ups).  - Continue ASA 81 mg daily      #Tobacco use disorder- Using nicotine patch and PRN gum.     #Non-severe malnutrition- Nutrition consulted and following at OSH.      Ordered CMP, CBC, and TSH with reflex.  No further recommendations at this time. Please page the on-call SHREE for any intercurrent medical issues which arise.      ADDENDUM: TSH normal. CBC normal. ALT elevated at 174, with unsatisfactory AST. Per chart review, LFTs last checked in December with  and . T bili and alk phos.  Reviewed with pharmacy, and possible medications causing would be Haldol, depakote, and statin. Discussed with psych who will taper haldol and Depakote. Holding statin, will check LDL.  Will repeat LFTs in AM, and check CK. Medicine will follow up on repeat LFTs, and CK.      Edith Alfaro PA-C  Hospitalist Service     2) Per updated progress note by IM 7/4:   A/P:  #Transaminitis - slowly rising LFTs since December. Asymptomatic. Reviewed with pharmacy, and possible medications causing would be Haldol, depakote, and statin. Discussed with psych who will taper haldol and Depakote. Holding  statin, (total cholesterol 91, with LDL 41). Repeat LFTs continue to rise slightly.   -Abdominal US tomorrow (NPO after midnight)  -Hepatitis B surface ab and agn and Hep C ab   -Repeat LFTs in 3 days     Medicine will follow up on Abdominal US, viral hepatitis studies and repeat LFTs.  No further recommendations at this time. Please page the on-call SHREE for any intercurrent medical issues which arise.      Edith Alfaro PA-C  Hospitalist Service     Per progress note 7/12:      Brief Medicine Note     Medicine following peripherally for LFT monitoring.  LFTs today improved:  (182) and ALT 92 (129). T bili and ALP WNL.      Today's vital signs, medications, and nursing notes were reviewed.       /77 (BP Location: Left arm)   Pulse 110   Temp 97.8  F (36.6  C) (Oral)   Resp 16   Ht 1.829 m (6')   Wt 87.5 kg (192 lb 14.4 oz)   SpO2 95%   BMI 26.16 kg/m       Continue current plan of care. Will continue to trend CMP In 3 days to ensure continued downtrend.      Kim Harman PA-C  Hospitalist Service        3) Urinary incontinence and stool incontinence: RN staff first reported urinary incontinence over weekend 7/9, per chart review patient has history of intermittent urinary incontinence going back to at least 2016, continues to have intermittent episodes of incontinence, continues to have both urinary and stool incontinence, placed IM consult for further assessment of need for additional work up given pts limitations as historian, appreciate assistance, no abnormalities noted on evaluation including bladder scan. Patient declining to wear incontinence briefs most days, staff continuing to encourage.    4) Unvaccinated for Covid-19, see interim history of note 8/19 by writer, attempted to discuss R/B/A of vaccination with patient, he did not demonstrate capacity, has no next of kin acting as surrogate decision maker, is awaiting guardianship, given patients age and medical co-morbidities  the risks of receiving Covid vaccine are outweighed by the benefits, including patient is required to be vaccinated to be considered for discharge to some nursing home facilities which would be a more appropriate milieu and less restrictive environment for patient than locked inpatient psychiatric unit. Dr. Nickerson provided second opinion regarding administration of Covid vaccine on 8.20 and agrees with recommendation.   -Covid vaccine order placed 8/23     This note was created by undersigned using a Dragon dictation system. All typing errors or contextual distortion are unintentional and software inherent.     Disposition Plan   Reason for ongoing admission: is unable to care for self due to severe psychosis or mariusz and neurocognitive disorder  Discharge location: TBD, likely locked NH facility , unable to progress on discharge planning due to guardianship not being in place, see CTC notes  Discharge Medications: not ordered  Follow-up Appointments: not scheduled  Legal Status: Full MI commitment and Yanez     Patients care will be transferred to another provider 8/25.    Entered by: Jeanette Dobbins on 8/24/2021 at 7:23 AM

## 2021-08-24 NOTE — PLAN OF CARE
Patient has spent majority of the shift in the milieu. Patient denies suicidal ideation and self injurious thoughts. Denies anxiety and depression. Denies auditory and visual hallucinations. Still preoccupied and talks to himself. Ate dinner. Med compliant.     Patient evaluation continues. Assessed mood,anxiety,thoughts and behavior.     Patient gradually progressing towards goals.    Patient is encouraged to participate in groups and assisted to develop healthy coping skills.     VS reviewed: BP 93/68   Pulse 83   Temp (!) 96.7  F (35.9  C) (Oral)   Resp 16   Ht 1.829 m (6')   Wt 89.4 kg (197 lb 1.6 oz)   SpO2 96%   BMI 26.73 kg/m      Length of stay: 54    Refer to daily team meeting notes for individualized plan of care. Nursing will continue to assess.

## 2021-08-24 NOTE — PLAN OF CARE
Assessment/Intervention/Current Symtoms and Care Coordination  -Chart review  -Pre round meeting with team  -Rounded with team, addressed patient needs/concerns  -Post round meeting with team  Current Symptoms include the following: Psychosis      I have provided management with the documentation needed as guardianship is pursued with [facility].    Pt received the Covid vaccine today.   I called the [facility] who required vaccination and there are no beds available.      Discharge Plan or Goal  Considerations include: locked nursing home    Barriers to Discharge  No guardianship available to the pt    Referral Status  locked nursing home, need to have a guardian    Legal Status  Patient is under MI commitment in [facility]

## 2021-08-25 PROCEDURE — 250N000013 HC RX MED GY IP 250 OP 250 PS 637: Performed by: PHYSICIAN ASSISTANT

## 2021-08-25 PROCEDURE — 250N000013 HC RX MED GY IP 250 OP 250 PS 637: Performed by: PSYCHIATRY & NEUROLOGY

## 2021-08-25 PROCEDURE — 124N000002 HC R&B MH UMMC

## 2021-08-25 PROCEDURE — 99232 SBSQ HOSP IP/OBS MODERATE 35: CPT | Performed by: PSYCHIATRY & NEUROLOGY

## 2021-08-25 RX ADMIN — MEMANTINE 10 MG: 5 TABLET ORAL at 08:35

## 2021-08-25 RX ADMIN — METOPROLOL TARTRATE 25 MG: 25 TABLET, FILM COATED ORAL at 08:31

## 2021-08-25 RX ADMIN — BENZTROPINE MESYLATE 1 MG: 1 TABLET ORAL at 08:34

## 2021-08-25 RX ADMIN — BENZTROPINE MESYLATE 1 MG: 1 TABLET ORAL at 22:02

## 2021-08-25 RX ADMIN — DOCUSATE SODIUM 100 MG: 100 CAPSULE, LIQUID FILLED ORAL at 08:33

## 2021-08-25 RX ADMIN — SALMETEROL XINAFOATE 1 PUFF: 50 POWDER, METERED ORAL; RESPIRATORY (INHALATION) at 22:05

## 2021-08-25 RX ADMIN — ASPIRIN 81 MG CHEWABLE TABLET 81 MG: 81 TABLET CHEWABLE at 08:34

## 2021-08-25 RX ADMIN — DOCUSATE SODIUM 100 MG: 100 CAPSULE, LIQUID FILLED ORAL at 22:02

## 2021-08-25 RX ADMIN — GUAIFENESIN 600 MG: 600 TABLET, EXTENDED RELEASE ORAL at 08:30

## 2021-08-25 RX ADMIN — SALMETEROL XINAFOATE 1 PUFF: 50 POWDER, METERED ORAL; RESPIRATORY (INHALATION) at 08:35

## 2021-08-25 RX ADMIN — CLOZAPINE 300 MG: 100 TABLET ORAL at 22:01

## 2021-08-25 RX ADMIN — Medication 50 MCG: at 08:33

## 2021-08-25 RX ADMIN — METOPROLOL TARTRATE 25 MG: 25 TABLET, FILM COATED ORAL at 22:02

## 2021-08-25 RX ADMIN — RIVASTIGMINE 1 PATCH: 4.6 PATCH TRANSDERMAL at 08:30

## 2021-08-25 RX ADMIN — MEMANTINE 10 MG: 5 TABLET ORAL at 22:02

## 2021-08-25 RX ADMIN — LEVOTHYROXINE SODIUM 88 MCG: 88 TABLET ORAL at 08:33

## 2021-08-25 RX ADMIN — ATORVASTATIN CALCIUM 80 MG: 80 TABLET, FILM COATED ORAL at 22:02

## 2021-08-25 RX ADMIN — POLYETHYLENE GLYCOL 3350 17 G: 17 POWDER, FOR SOLUTION ORAL at 08:30

## 2021-08-25 RX ADMIN — GUAIFENESIN 600 MG: 600 TABLET, EXTENDED RELEASE ORAL at 22:02

## 2021-08-25 ASSESSMENT — ACTIVITIES OF DAILY LIVING (ADL)
LAUNDRY: WITH SUPERVISION
HYGIENE/GROOMING: INDEPENDENT
ORAL_HYGIENE: INDEPENDENT
DRESS: INDEPENDENT
LAUNDRY: WITH SUPERVISION
HYGIENE/GROOMING: INDEPENDENT;PROMPTS
DRESS: INDEPENDENT
ORAL_HYGIENE: PROMPTS

## 2021-08-25 NOTE — PLAN OF CARE
Assessment/Intervention/Current Symtoms and Care Coordination  -Chart review  -Pre round meeting with team  -Rounded with team, addressed patient needs/concerns  -Post round meeting with team  Current Symptoms include the following: disorganization and confusion      We are working with [facility] to see if they will somehow appoint a guardian.      Discharge Plan or Goal  Pending stabilization & development of a safe discharge plan.  Considerations include:  locked nursing home    Barriers to Discharge  Guardianship has not been available to pt so this is the barrier to go to a nursing home    Referral Status  Guardianship and locked nursing home    Legal Status  Patient is under MI commitment in [facility]

## 2021-08-25 NOTE — PLAN OF CARE
Feeling tired, hard time waking up in the morning. Got up with encouragement took all medications, vital signs taken.  Appetite good eating 100% of breakfast, needs help filling out menu.  Usually cooperative , little to say to staff or peers.  Feeling frustrated wanting to leave.  Blunted affect, needs encouragement with his ADL'S

## 2021-08-25 NOTE — PLAN OF CARE
Problem: Behavioral Health Plan of Care  Goal: Plan of Care Review  Outcome: No Change   Night Shift Summary (8/24/21 into 08/25/21)    Pt asleep at start of shift. Breathing quiet and unlabored.     Woke up around 0100 and wanted to watch TV. Pt was oriented to time and went back to bed. Pt awake again 0145 to 0215. Pt declined having incontinence and bed was not wet at this time.     Pt had no c/o pain or discomfort during the HS.     Appears to have slept 6.25 hours.

## 2021-08-25 NOTE — PROGRESS NOTES
"[facility], [place]   Psychiatric Progress Note        Interim History:   The patient's care was discussed with the treatment team during the daily team meeting and/or staff's chart notes were reviewed.  Staff report patient is neurocognitively impaired. Oriented to person only.     The patient reports that he is \"doing fine.\" Denies problems with sleep or appetite. Planning to take a nap. Denies SI or HI. Denies AH or VH. Does not remember this provider from previous visit.          Medications:       aspirin  81 mg Oral Daily     atorvastatin  80 mg Oral At Bedtime     benztropine  1 mg Oral BID     cholecalciferol  1,250 mcg Oral Q7 Days     cloZAPine  300 mg Oral At Bedtime     docusate sodium  100 mg Oral BID     guaiFENesin  600 mg Oral BID     levothyroxine  88 mcg Oral Daily     memantine  10 mg Oral BID     metoprolol tartrate  25 mg Oral BID     polyethylene glycol  17 g Oral Daily     rivastigmine  1 patch Transdermal Daily     rivastigmine   Transdermal Q8H     salmeterol  1 puff Inhalation BID     vitamin D3  50 mcg Oral Daily          Allergies:     Allergies   Allergen Reactions     Ibuprofen      Latex           Labs:   No results found for this or any previous visit (from the past 24 hour(s)).       Psychiatric Examination:     BP (!) 130/90   Pulse 92   Temp 97.8  F (36.6  C) (Oral)   Resp 16   Ht 1.829 m (6')   Wt 89.4 kg (197 lb 1.6 oz)   SpO2 94%   BMI 26.73 kg/m    Weight is 197 lbs 1.6 oz  Body mass index is 26.73 kg/m .  Orthostatic Vitals       Most Recent      Sitting Orthostatic /90 08/25 0903    Sitting Orthostatic Pulse (bpm) 92 08/25 0903    Standing Orthostatic /87 08/25 0903    Standing Orthostatic Pulse (bpm) 109 08/25 0903            Appearance: awake, alert and adequately groomed  Attitude:  cooperative  Eye Contact:  fair  Mood:  good  Affect:  intensity is blunted  Speech:  clear, coherent  Psychomotor Behavior:  no evidence of " tardive dyskinesia, dystonia, or tics  Thought Process:  goal oriented  Associations:  no loose associations  Thought Content:  no evidence of suicidal ideation or homicidal ideation and obsessions present  Insight:  limited  Judgement:  limited  Oriented to:  person only  Attention Span and Concentration:  fair  Recent and Remote Memory:  poor    Clinical Global Impressions  First:  Considering your total clinical experience with this particular patient population, how severe are the patient's symptoms at this time?: 7 (07/01/21 0630)  Compared to the patient's condition at the START of treatment, this patient's condition is: 4 (07/01/21 0630)  Most recent:  Considering your total clinical experience with this particular patient population, how severe are the patient's symptoms at this time?: 6 (08/23/21 1705)  Compared to the patient's condition at the START of treatment, this patient's condition is: 3 (08/23/21 1705)         Precautions:     Behavioral Orders   Procedures     Assault precautions     Code 2     Elopement precautions     Routine Programming     As clinically indicated     Single Room     Status 15     Every 15 minutes.          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   Stimulant use disorder, in remission.          Plan:     Assessment and hospital summary:  The patient is a 54yo male with a history of schizophrenia and TBI and multiple medical co-morbidities as above who was admitted after being transferred from [facility] medical [facility] after a nearly year-long stay. Is currently under commitment with Yanez recently amended to include Clozaril. While at [facility] was noted to have ongoing agitation and frequently attempting to elope from unit requiring 1:1 staffing for safety. He was started on 1:1 staff upon  transfer, this was discontinued as patient has been more cooperative without elopement attempts. IM consult completed on admission and continued to follow due to elevated LFTs. Haldol and VPA discontinued due to LFTs. Cross titration completed from risperidone to clozaril after Yanez amended. EPSE/TD movements noted, have appeared stable over past few weeks. Patient has continued to have disorganization and active psychosis symptoms which appear to be at patients baseline. Team continues to work on disposition which barriers include patient has no guardian or next of kin willing to act as surrogate decision maker, see CTC notes for complete details.      Psychiatric treatment/inteventions:  Medications:   -continue Clozaril 300mg at bedtime for psychosis and agitation. Will not adjust dose further at this time, movements consistent with TD noted, and level in therapeutic range   -continue benztropine 1mg BID for EPSE  -continue memantine 5mg daily for neurocognitive disorder  -continue risperdal 1mg Q6H PRN agitation   -continue lorazepam 0.5-1.0mg Q4H PRN anxiety or severe agitation  -continue rivastigmine patch started 8/2 to target neurocognitive disorder       Disposition Plan   Reason for ongoing admission: is unable to care for self due to severe psychosis or mariusz  Discharge location: SNF  Discharge Medications: not ordered  Follow-up Appointments: not scheduled  Legal Status: full commitment    Entered by: Wilton Morfin on 6/30/21 at 12:09 PM

## 2021-08-25 NOTE — PLAN OF CARE
Problem: Behavioral Health Plan of Care  Goal: Plan of Care Review  Recent Flowsheet Documentation  Taken 8/24/2021 1700 by Lynn Singh RN  Plan of Care Reviewed With: patient  Patient Agreement with Plan of Care: agrees with comment (describe)  Taken 8/24/2021 1300 by Lynn Singh RN  Plan of Care Reviewed With: patient  Patient Agreement with Plan of Care: (Daily request to discharge. )   agrees with comment (describe)   other (see comments)     Patient has an uneventful evening.  Spent most of the shift in the lounge.  Ate 100% of supper.  Patient is observed responding to internal stimuli while sitting in the lounge.  It does not appear to be disruptive to peers.  Patient remains medication compliant.  Continue with plan of care.

## 2021-08-26 PROCEDURE — 99232 SBSQ HOSP IP/OBS MODERATE 35: CPT | Performed by: PSYCHIATRY & NEUROLOGY

## 2021-08-26 PROCEDURE — 124N000002 HC R&B MH UMMC

## 2021-08-26 PROCEDURE — 250N000013 HC RX MED GY IP 250 OP 250 PS 637: Performed by: PHYSICIAN ASSISTANT

## 2021-08-26 PROCEDURE — 250N000013 HC RX MED GY IP 250 OP 250 PS 637: Performed by: PSYCHIATRY & NEUROLOGY

## 2021-08-26 RX ADMIN — BENZTROPINE MESYLATE 1 MG: 1 TABLET ORAL at 08:58

## 2021-08-26 RX ADMIN — LEVOTHYROXINE SODIUM 88 MCG: 88 TABLET ORAL at 08:59

## 2021-08-26 RX ADMIN — ASPIRIN 81 MG CHEWABLE TABLET 81 MG: 81 TABLET CHEWABLE at 08:59

## 2021-08-26 RX ADMIN — ATORVASTATIN CALCIUM 80 MG: 80 TABLET, FILM COATED ORAL at 22:04

## 2021-08-26 RX ADMIN — SALMETEROL XINAFOATE 1 PUFF: 50 POWDER, METERED ORAL; RESPIRATORY (INHALATION) at 08:58

## 2021-08-26 RX ADMIN — MEMANTINE 10 MG: 5 TABLET ORAL at 22:05

## 2021-08-26 RX ADMIN — CHOLECALCIFEROL CAP 1.25 MG (50000 UNIT) 1250 MCG: 1.25 CAP at 09:02

## 2021-08-26 RX ADMIN — METOPROLOL TARTRATE 25 MG: 25 TABLET, FILM COATED ORAL at 22:05

## 2021-08-26 RX ADMIN — Medication 50 MCG: at 08:59

## 2021-08-26 RX ADMIN — CLOZAPINE 300 MG: 100 TABLET ORAL at 22:04

## 2021-08-26 RX ADMIN — DOCUSATE SODIUM 100 MG: 100 CAPSULE, LIQUID FILLED ORAL at 08:58

## 2021-08-26 RX ADMIN — DOCUSATE SODIUM 100 MG: 100 CAPSULE, LIQUID FILLED ORAL at 22:04

## 2021-08-26 RX ADMIN — RIVASTIGMINE 1 PATCH: 4.6 PATCH TRANSDERMAL at 08:59

## 2021-08-26 RX ADMIN — GUAIFENESIN 600 MG: 600 TABLET, EXTENDED RELEASE ORAL at 22:04

## 2021-08-26 RX ADMIN — GUAIFENESIN 600 MG: 600 TABLET, EXTENDED RELEASE ORAL at 08:58

## 2021-08-26 RX ADMIN — METOPROLOL TARTRATE 25 MG: 25 TABLET, FILM COATED ORAL at 08:59

## 2021-08-26 RX ADMIN — BENZTROPINE MESYLATE 1 MG: 1 TABLET ORAL at 22:05

## 2021-08-26 RX ADMIN — POLYETHYLENE GLYCOL 3350 17 G: 17 POWDER, FOR SOLUTION ORAL at 08:59

## 2021-08-26 RX ADMIN — MEMANTINE 10 MG: 5 TABLET ORAL at 08:59

## 2021-08-26 RX ADMIN — SALMETEROL XINAFOATE 1 PUFF: 50 POWDER, METERED ORAL; RESPIRATORY (INHALATION) at 22:09

## 2021-08-26 ASSESSMENT — ACTIVITIES OF DAILY LIVING (ADL)
ORAL_HYGIENE: INDEPENDENT
LAUNDRY: WITH SUPERVISION
DRESS: INDEPENDENT
LAUNDRY: WITH SUPERVISION
HYGIENE/GROOMING: INDEPENDENT;SHOWER
DRESS: INDEPENDENT
ORAL_HYGIENE: INDEPENDENT
HYGIENE/GROOMING: INDEPENDENT

## 2021-08-26 NOTE — PLAN OF CARE
"  Problem: Behavioral Health Plan of Care  Goal: Plan of Care Review  Recent Flowsheet Documentation  Taken 8/26/2021 1000 by Lynn Singh RN  Plan of Care Reviewed With: patient    Patient came to the dining room and ate 100% of breakfast.  Quickly returned to his room.  Vitals are obtained.  Patient is AVSS.  Patient is compliant with medication administration.  Pt affect is flat and blunted.  Thoughts appear confused.  Patient is oriented to self.  Patient is calm and cooperative.  Follows directions.  Speech is difficult to understand. The patient rambles and speaks in a low tone.  Patient response to suicidal ideation and mental health symptoms.  \"No I'm fine, when can I order pizza?\" \"I have money.\"  Patient is reminded he is in the hospital and can not order pizza.  Later, patient independently approached the nursing station and asked for change of clothes.  He is offered to shower as well.  Patient agreed.   Staff assist to change pt bed linen.  Patient has no further complaints and denies pain.  Continue with plan of care.   "

## 2021-08-26 NOTE — PLAN OF CARE
BEHAVIORAL TEAM DISCUSSION    Participants:   Marylu WEN, Lynn Singh RN      Progress:   Pt's psychosis is being treated with Clozaril.    RN reported improvement seen lately that the pt is asking for shower, clean clothes and clean linens.    Anticipated length of stay:   2 months      Continued Stay Criteria/Rationale:   The pt needs a secure facility to be transferred to since he is not able to care for self.      Medical/Physical:   WBC Count 4.0 - 11.0 10e3/uL 6.6      RBC Count 4.40 - 5.90 10e6/uL 4.76     Hemoglobin 13.3 - 17.7 g/dL 15.4     Hematocrit 40.0 - 53.0 % 44.1     MCV 78 - 100 fL 93     MCH 26.5 - 33.0 pg 32.4     MCHC 31.5 - 36.5 g/dL 34.9     RDW 10.0 - 15.0 % 13.2     Platelet Count 150 - 450 10e3/uL 218     % Neutrophils % 51     % Lymphocytes % 22     % Monocytes % 10     % Eosinophils % 16     % Basophils % 1     % Immature Granulocytes % 0     NRBCs per 100 WBC <1 /100 0     Absolute Neutrophils 1.6 - 8.3 10e3/uL 3.3     Absolute Lymphocytes 0.8 - 5.3 10e3/uL 1.5     Absolute Monocytes 0.0 - 1.3 10e3/uL 0.7     Absolute Eosinophils 0.0 - 0.7 10e3/uL 1.1High      Absolute Basophils 0.0 - 0.2 10e3/uL 0.1     Absolute Immature Granulocytes <=0.0 10e3/uL 0.0     Absolute NRBCs 10e3/uL 0.0    Resulting Agency  [facility]         Specimen Collected: 08/20/21  8:03 AM Last Resulted: 08/20/21  8:43 AM          Precautions:   Behavioral Orders   Procedures    Assault precautions    Code 2    Elopement precautions    Routine Programming     As clinically indicated    Single Room    Status 15     Every 15 minutes.     Plan:   Pursue guardianship through [facility]  Clozaril with weekly CBC    Rationale for change in precautions or plan: no changes

## 2021-08-26 NOTE — PLAN OF CARE
Assessment/Intervention/Current Symtoms and Care Coordination  -Rounded with team, addressed patient needs/concerns  Current Symptoms include the following: disorganization and confusion          Discharge Plan or Goal  Locked nursing home.          Barriers to Discharge  Pt does not have a guardian.        Referral Status  [facility] Attorney is being approached for guardianship solution.        Legal Status  Patient is under MI commitment in [facility]

## 2021-08-26 NOTE — PROGRESS NOTES
"[facility], [place]   Psychiatric Progress Note        Interim History:   The patient's care was discussed with the treatment team during the daily team meeting and/or staff's chart notes were reviewed.  Staff report patient is about the same. Did receive his covid vaccine.     The patient reports that he is \"okay.\" Lying down and somewhat cooperative. Poor eye contact. Denies problems with appetite. Says that he \"did not really\" sleep well. Denies SI or HI. Denies AH or VH.          Medications:       aspirin  81 mg Oral Daily     atorvastatin  80 mg Oral At Bedtime     benztropine  1 mg Oral BID     cholecalciferol  1,250 mcg Oral Q7 Days     cloZAPine  300 mg Oral At Bedtime     docusate sodium  100 mg Oral BID     guaiFENesin  600 mg Oral BID     levothyroxine  88 mcg Oral Daily     memantine  10 mg Oral BID     metoprolol tartrate  25 mg Oral BID     polyethylene glycol  17 g Oral Daily     rivastigmine  1 patch Transdermal Daily     rivastigmine   Transdermal Q8H     salmeterol  1 puff Inhalation BID     vitamin D3  50 mcg Oral Daily          Allergies:     Allergies   Allergen Reactions     Ibuprofen      Latex           Labs:   No results found for this or any previous visit (from the past 24 hour(s)).       Psychiatric Examination:     /80   Pulse 94   Temp 97.6  F (36.4  C) (Oral)   Resp 16   Ht 1.829 m (6')   Wt 89.4 kg (197 lb 1.6 oz)   SpO2 94%   BMI 26.73 kg/m    Weight is 197 lbs 1.6 oz  Body mass index is 26.73 kg/m .  Orthostatic Vitals       Most Recent      Sitting Orthostatic /90 08/25 0903    Sitting Orthostatic Pulse (bpm) 92 08/25 0903    Standing Orthostatic /87 08/25 0903    Standing Orthostatic Pulse (bpm) 109 08/25 0903            Appearance: awake, alert and adequately groomed  Attitude:  somewhat cooperative  Eye Contact:  poor   Mood:  \"okay\"  Affect:  intensity is blunted  Speech:  mumbling  Psychomotor Behavior:  no evidence of " tardive dyskinesia, dystonia, or tics  Thought Process:  goal oriented  Associations:  no loose associations  Thought Content:  no evidence of suicidal ideation or homicidal ideation and obsessions present  Insight:  limited  Judgement:  limited  Oriented to:  person only  Attention Span and Concentration:  fair  Recent and Remote Memory:  poor    Clinical Global Impressions  First:  Considering your total clinical experience with this particular patient population, how severe are the patient's symptoms at this time?: 7 (07/01/21 0630)  Compared to the patient's condition at the START of treatment, this patient's condition is: 4 (07/01/21 0630)  Most recent:  Considering your total clinical experience with this particular patient population, how severe are the patient's symptoms at this time?: 6 (08/23/21 1705)  Compared to the patient's condition at the START of treatment, this patient's condition is: 3 (08/23/21 1705)         Precautions:     Behavioral Orders   Procedures     Assault precautions     Code 2     Elopement precautions     Routine Programming     As clinically indicated     Single Room     Status 15     Every 15 minutes.          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   Stimulant use disorder, in remission.          Plan:     Assessment and hospital summary:  The patient is a 54yo male with a history of schizophrenia and TBI and multiple medical co-morbidities as above who was admitted after being transferred from [facility] medical [facility] after a nearly year-long stay. Is currently under commitment with [facility] recently amended to include Clozaril. While at [facility] was noted to have ongoing agitation and frequently attempting to elope from unit requiring 1:1 staffing for safety. He was started on 1:1 staff upon  transfer, this was discontinued as patient has been more cooperative without elopement attempts. IM consult completed on admission and continued to follow due to elevated LFTs. Haldol and VPA discontinued due to LFTs. Cross titration completed from risperidone to clozaril after Yanez amended. EPSE/TD movements noted, have appeared stable over past few weeks. Patient has continued to have disorganization and active psychosis symptoms which appear to be at patients baseline. Team continues to work on disposition which barriers include patient has no guardian or next of kin willing to act as surrogate decision maker, see CTC notes for complete details.      Psychiatric treatment/inteventions:  Medications:   -continue Clozaril 300mg at bedtime for psychosis and agitation. Will not adjust dose further at this time, movements consistent with TD noted, and level in therapeutic range   -continue benztropine 1mg BID for EPSE  -continue memantine 5mg daily for neurocognitive disorder  -continue risperdal 1mg Q6H PRN agitation   -continue lorazepam 0.5-1.0mg Q4H PRN anxiety or severe agitation  -continue rivastigmine patch started 8/2 to target neurocognitive disorder       Disposition Plan   Reason for ongoing admission: is unable to care for self due to severe psychosis or mariusz  Discharge location: SNF  Discharge Medications: not ordered  Follow-up Appointments: not scheduled  Legal Status: full commitment    Entered by: Wilton Morfin on 8/26/21 at 2:17 PM

## 2021-08-26 NOTE — PLAN OF CARE
Patient has spent majority of the shift in the milieu. Keeps to himself. Patient denies suicidal ideation and self injurious thoughts. Denies anxiety and depression. Denies auditory and visual hallucinations. Continues to appear preoccupied. Mumbles and is difficulty to understand when he speaks. Ate dinner. Med compliant.     Patient evaluation continues. Assessed mood,anxiety,thoughts and behavior.     Patient gradually progressing towards goals.    Patient is encouraged to participate in groups and assisted to develop healthy coping skills.     VS reviewed: /81   Pulse 89   Temp 97.3  F (36.3  C) (Oral)   Resp 16   Ht 1.829 m (6')   Wt 89.4 kg (197 lb 1.6 oz)   SpO2 94%   BMI 26.73 kg/m      Length of stay: 56    Refer to daily team meeting notes for individualized plan of care. Nursing will continue to assess.

## 2021-08-27 LAB
BASOPHILS # BLD AUTO: 0.1 10E3/UL (ref 0–0.2)
BASOPHILS NFR BLD AUTO: 2 %
EOSINOPHIL # BLD AUTO: 1.2 10E3/UL (ref 0–0.7)
EOSINOPHIL NFR BLD AUTO: 18 %
ERYTHROCYTE [DISTWIDTH] IN BLOOD BY AUTOMATED COUNT: 13.2 % (ref 10–15)
HCT VFR BLD AUTO: 45.9 % (ref 40–53)
HGB BLD-MCNC: 16.2 G/DL (ref 13.3–17.7)
IMM GRANULOCYTES # BLD: 0 10E3/UL
IMM GRANULOCYTES NFR BLD: 0 %
LYMPHOCYTES # BLD AUTO: 1.4 10E3/UL (ref 0.8–5.3)
LYMPHOCYTES NFR BLD AUTO: 22 %
MCH RBC QN AUTO: 32.6 PG (ref 26.5–33)
MCHC RBC AUTO-ENTMCNC: 35.3 G/DL (ref 31.5–36.5)
MCV RBC AUTO: 92 FL (ref 78–100)
MONOCYTES # BLD AUTO: 0.8 10E3/UL (ref 0–1.3)
MONOCYTES NFR BLD AUTO: 13 %
NEUTROPHILS # BLD AUTO: 2.9 10E3/UL (ref 1.6–8.3)
NEUTROPHILS NFR BLD AUTO: 45 %
NRBC # BLD AUTO: 0 10E3/UL
NRBC BLD AUTO-RTO: 0 /100
PLATELET # BLD AUTO: 220 10E3/UL (ref 150–450)
RBC # BLD AUTO: 4.97 10E6/UL (ref 4.4–5.9)
WBC # BLD AUTO: 6.4 10E3/UL (ref 4–11)

## 2021-08-27 PROCEDURE — 250N000013 HC RX MED GY IP 250 OP 250 PS 637: Performed by: PHYSICIAN ASSISTANT

## 2021-08-27 PROCEDURE — 250N000013 HC RX MED GY IP 250 OP 250 PS 637: Performed by: PSYCHIATRY & NEUROLOGY

## 2021-08-27 PROCEDURE — 124N000002 HC R&B MH UMMC

## 2021-08-27 PROCEDURE — 36415 COLL VENOUS BLD VENIPUNCTURE: CPT | Performed by: PSYCHIATRY & NEUROLOGY

## 2021-08-27 PROCEDURE — 85014 HEMATOCRIT: CPT | Performed by: PSYCHIATRY & NEUROLOGY

## 2021-08-27 RX ADMIN — BENZTROPINE MESYLATE 1 MG: 1 TABLET ORAL at 21:50

## 2021-08-27 RX ADMIN — SALMETEROL XINAFOATE 1 PUFF: 50 POWDER, METERED ORAL; RESPIRATORY (INHALATION) at 21:53

## 2021-08-27 RX ADMIN — SALMETEROL XINAFOATE 1 PUFF: 50 POWDER, METERED ORAL; RESPIRATORY (INHALATION) at 08:57

## 2021-08-27 RX ADMIN — Medication 50 MCG: at 08:55

## 2021-08-27 RX ADMIN — ATORVASTATIN CALCIUM 80 MG: 80 TABLET, FILM COATED ORAL at 21:50

## 2021-08-27 RX ADMIN — RIVASTIGMINE 1 PATCH: 4.6 PATCH TRANSDERMAL at 08:55

## 2021-08-27 RX ADMIN — CLOZAPINE 300 MG: 100 TABLET ORAL at 21:50

## 2021-08-27 RX ADMIN — METOPROLOL TARTRATE 25 MG: 25 TABLET, FILM COATED ORAL at 22:00

## 2021-08-27 RX ADMIN — POLYETHYLENE GLYCOL 3350 17 G: 17 POWDER, FOR SOLUTION ORAL at 08:57

## 2021-08-27 RX ADMIN — MEMANTINE 10 MG: 5 TABLET ORAL at 08:55

## 2021-08-27 RX ADMIN — MEMANTINE 10 MG: 5 TABLET ORAL at 21:50

## 2021-08-27 RX ADMIN — DOCUSATE SODIUM 100 MG: 100 CAPSULE, LIQUID FILLED ORAL at 21:50

## 2021-08-27 RX ADMIN — GUAIFENESIN 600 MG: 600 TABLET, EXTENDED RELEASE ORAL at 08:55

## 2021-08-27 RX ADMIN — BENZTROPINE MESYLATE 1 MG: 1 TABLET ORAL at 08:55

## 2021-08-27 RX ADMIN — DOCUSATE SODIUM 100 MG: 100 CAPSULE, LIQUID FILLED ORAL at 08:55

## 2021-08-27 RX ADMIN — ASPIRIN 81 MG CHEWABLE TABLET 81 MG: 81 TABLET CHEWABLE at 08:55

## 2021-08-27 RX ADMIN — METOPROLOL TARTRATE 25 MG: 25 TABLET, FILM COATED ORAL at 08:55

## 2021-08-27 RX ADMIN — LEVOTHYROXINE SODIUM 88 MCG: 88 TABLET ORAL at 08:55

## 2021-08-27 RX ADMIN — GUAIFENESIN 600 MG: 600 TABLET, EXTENDED RELEASE ORAL at 21:49

## 2021-08-27 ASSESSMENT — ACTIVITIES OF DAILY LIVING (ADL)
HYGIENE/GROOMING: INDEPENDENT
DRESS: INDEPENDENT
LAUNDRY: WITH SUPERVISION
ORAL_HYGIENE: INDEPENDENT

## 2021-08-27 NOTE — PLAN OF CARE
Assessment/Intervention/Current Symtoms and Care Coordination  -Chart review  -Pre round meeting with team  -Rounded with team, addressed patient needs/concerns  -Post round meeting with team  Current Symptoms include the following: Psychosis, disorganization, and confusion    Discharge Plan or Goal  Pending stabilization & development of a safe discharge plan.  Considerations include:  Locked nursing home    Barriers to Discharge  Patient requires further psychiatric stabilization due to current symptomology    Referral Status  Guardianship and nursing home    Legal Status  Patient is under MI commitment in [facility]

## 2021-08-27 NOTE — PLAN OF CARE
Plan of care   Problem: Sleep Disturbance  Goal: Adequate Sleep/Rest  Outcome: Improving   Received pt sleeping in her bed with regular unlabored respiration; No PRN was requested or administered; No safety concern noted or reported; pt appeared to have slept for 7 hrs this shift; will continue to monitor and assess.

## 2021-08-27 NOTE — PLAN OF CARE
Patient has spent majority of the shift in the milieu. Patient denies suicidal ideation and self injurious thoughts. Denies anxiety and depression. Denies auditory and visual hallucinations. Continues to appears less distracted and focused when spoken to. Ate dinner. Med compliant. Pleasant on approach.    Patient evaluation continues. Assessed mood,anxiety,thoughts and behavior.     Patient gradually progressing towards goals.    Patient is encouraged to participate in groups and assisted to develop healthy coping skills.     VS reviewed: /83   Pulse 101   Temp 98  F (36.7  C) (Oral)   Resp 16   Ht 1.829 m (6')   Wt 89.4 kg (197 lb 1.6 oz)   SpO2 98%   BMI 26.73 kg/m      Length of stay: 57    Refer to daily team meeting notes for individualized plan of care. Nursing will continue to assess.

## 2021-08-27 NOTE — PLAN OF CARE
"Patient was dry this am. Cooperative with vital signs and lab draw this afternoon. Does not offer spontaneous conversation. Eating well at meals. Talks to himself while sitting in the lounge. During lab draw, made a statement with a curse in it and then said, \"Oh, I thought you were somebody else.\"  "

## 2021-08-28 PROCEDURE — 124N000002 HC R&B MH UMMC

## 2021-08-28 PROCEDURE — 250N000013 HC RX MED GY IP 250 OP 250 PS 637: Performed by: PSYCHIATRY & NEUROLOGY

## 2021-08-28 PROCEDURE — 250N000013 HC RX MED GY IP 250 OP 250 PS 637: Performed by: PHYSICIAN ASSISTANT

## 2021-08-28 RX ADMIN — METOPROLOL TARTRATE 25 MG: 25 TABLET, FILM COATED ORAL at 08:36

## 2021-08-28 RX ADMIN — MEMANTINE 10 MG: 5 TABLET ORAL at 08:35

## 2021-08-28 RX ADMIN — CLOZAPINE 300 MG: 100 TABLET ORAL at 21:29

## 2021-08-28 RX ADMIN — BENZTROPINE MESYLATE 1 MG: 1 TABLET ORAL at 21:30

## 2021-08-28 RX ADMIN — BENZTROPINE MESYLATE 1 MG: 1 TABLET ORAL at 08:36

## 2021-08-28 RX ADMIN — SALMETEROL XINAFOATE 1 PUFF: 50 POWDER, METERED ORAL; RESPIRATORY (INHALATION) at 08:39

## 2021-08-28 RX ADMIN — GUAIFENESIN 600 MG: 600 TABLET, EXTENDED RELEASE ORAL at 21:29

## 2021-08-28 RX ADMIN — SALMETEROL XINAFOATE 1 PUFF: 50 POWDER, METERED ORAL; RESPIRATORY (INHALATION) at 21:30

## 2021-08-28 RX ADMIN — ATORVASTATIN CALCIUM 80 MG: 80 TABLET, FILM COATED ORAL at 21:29

## 2021-08-28 RX ADMIN — RIVASTIGMINE 1 PATCH: 4.6 PATCH TRANSDERMAL at 08:35

## 2021-08-28 RX ADMIN — Medication 50 MCG: at 08:35

## 2021-08-28 RX ADMIN — LEVOTHYROXINE SODIUM 88 MCG: 88 TABLET ORAL at 08:36

## 2021-08-28 RX ADMIN — GUAIFENESIN 600 MG: 600 TABLET, EXTENDED RELEASE ORAL at 08:36

## 2021-08-28 RX ADMIN — DOCUSATE SODIUM 100 MG: 100 CAPSULE, LIQUID FILLED ORAL at 21:29

## 2021-08-28 RX ADMIN — ASPIRIN 81 MG CHEWABLE TABLET 81 MG: 81 TABLET CHEWABLE at 08:35

## 2021-08-28 RX ADMIN — POLYETHYLENE GLYCOL 3350 17 G: 17 POWDER, FOR SOLUTION ORAL at 08:36

## 2021-08-28 RX ADMIN — METOPROLOL TARTRATE 25 MG: 25 TABLET, FILM COATED ORAL at 21:29

## 2021-08-28 RX ADMIN — MEMANTINE 10 MG: 5 TABLET ORAL at 21:29

## 2021-08-28 RX ADMIN — DOCUSATE SODIUM 100 MG: 100 CAPSULE, LIQUID FILLED ORAL at 08:35

## 2021-08-28 ASSESSMENT — ACTIVITIES OF DAILY LIVING (ADL)
DRESS: INDEPENDENT
LAUNDRY: WITH SUPERVISION
HYGIENE/GROOMING: INDEPENDENT
HYGIENE/GROOMING: INDEPENDENT
LAUNDRY: WITH SUPERVISION
DRESS: INDEPENDENT
ORAL_HYGIENE: INDEPENDENT
ORAL_HYGIENE: INDEPENDENT

## 2021-08-28 NOTE — PLAN OF CARE
Problem: Psychotic Symptoms  Goal: Social and Therapeutic (Psychotic Symptoms)  Description: Signs and symptoms of listed problems will be absent or manageable.  Recent Flowsheet Documentation  Taken 8/28/2021 1304 by Tess Vaca RN  Psychotic Symptoms Assessed: all  Psychotic Symptoms Present:    thought process    insight    speech    orientation   Patient out for breakfast, ate 100 % he then went back to bed.  When this staff requested that he sit up to take meds and get a set of vitals, he started cussing in anger . He did take meds  and  was naeem compliant with vitals. Staff requested him to shower but he declined.  Poor ADL'S, incontinent usually at night.  Will continue to monitor.   Staff just came to this staff and said that patient did agree to get into the shower.

## 2021-08-28 NOTE — PLAN OF CARE
Patient has spent majority of the shift in the milieu. Patient denies suicidal ideation and self injurious thoughts. Denies auditory and visual hallucinations. Continues to talk to himself. Ate dinner. Med compliant. Cooperative and pleasant on approach.     Patient evaluation continues. Assessed mood,anxiety,thoughts and behavior.     Patient gradually progressing towards goals.    Patient is encouraged to participate in groups and assisted to develop healthy coping skills.     VS reviewed: BP 97/67   Pulse 83   Temp 97.9  F (36.6  C) (Oral)   Resp 16   Ht 1.829 m (6')   Wt 89.4 kg (197 lb 1.6 oz)   SpO2 95%   BMI 26.73 kg/m      Length of stay: 58    Refer to daily team meeting notes for individualized plan of care. Nursing will continue to assess.

## 2021-08-29 PROCEDURE — 250N000013 HC RX MED GY IP 250 OP 250 PS 637: Performed by: PSYCHIATRY & NEUROLOGY

## 2021-08-29 PROCEDURE — 250N000013 HC RX MED GY IP 250 OP 250 PS 637: Performed by: PHYSICIAN ASSISTANT

## 2021-08-29 PROCEDURE — 124N000002 HC R&B MH UMMC

## 2021-08-29 RX ADMIN — GUAIFENESIN 600 MG: 600 TABLET, EXTENDED RELEASE ORAL at 21:05

## 2021-08-29 RX ADMIN — METOPROLOL TARTRATE 25 MG: 25 TABLET, FILM COATED ORAL at 21:05

## 2021-08-29 RX ADMIN — Medication 50 MCG: at 08:17

## 2021-08-29 RX ADMIN — DOCUSATE SODIUM 100 MG: 100 CAPSULE, LIQUID FILLED ORAL at 08:18

## 2021-08-29 RX ADMIN — MEMANTINE 10 MG: 5 TABLET ORAL at 08:17

## 2021-08-29 RX ADMIN — MEMANTINE 10 MG: 5 TABLET ORAL at 21:05

## 2021-08-29 RX ADMIN — SALMETEROL XINAFOATE 1 PUFF: 50 POWDER, METERED ORAL; RESPIRATORY (INHALATION) at 08:18

## 2021-08-29 RX ADMIN — POLYETHYLENE GLYCOL 3350 17 G: 17 POWDER, FOR SOLUTION ORAL at 08:17

## 2021-08-29 RX ADMIN — BENZTROPINE MESYLATE 1 MG: 1 TABLET ORAL at 08:17

## 2021-08-29 RX ADMIN — RIVASTIGMINE 1 PATCH: 4.6 PATCH TRANSDERMAL at 08:17

## 2021-08-29 RX ADMIN — BENZTROPINE MESYLATE 1 MG: 1 TABLET ORAL at 21:05

## 2021-08-29 RX ADMIN — CLOZAPINE 300 MG: 100 TABLET ORAL at 21:04

## 2021-08-29 RX ADMIN — LEVOTHYROXINE SODIUM 88 MCG: 88 TABLET ORAL at 08:17

## 2021-08-29 RX ADMIN — SALMETEROL XINAFOATE 1 PUFF: 50 POWDER, METERED ORAL; RESPIRATORY (INHALATION) at 21:08

## 2021-08-29 RX ADMIN — ASPIRIN 81 MG CHEWABLE TABLET 81 MG: 81 TABLET CHEWABLE at 08:17

## 2021-08-29 RX ADMIN — ATORVASTATIN CALCIUM 80 MG: 80 TABLET, FILM COATED ORAL at 21:04

## 2021-08-29 RX ADMIN — DOCUSATE SODIUM 100 MG: 100 CAPSULE, LIQUID FILLED ORAL at 21:05

## 2021-08-29 RX ADMIN — METOPROLOL TARTRATE 25 MG: 25 TABLET, FILM COATED ORAL at 08:19

## 2021-08-29 RX ADMIN — GUAIFENESIN 600 MG: 600 TABLET, EXTENDED RELEASE ORAL at 08:17

## 2021-08-29 ASSESSMENT — ACTIVITIES OF DAILY LIVING (ADL)
HYGIENE/GROOMING: INDEPENDENT
DRESS: INDEPENDENT
ORAL_HYGIENE: INDEPENDENT
DRESS: INDEPENDENT
ORAL_HYGIENE: INDEPENDENT
LAUNDRY: WITH SUPERVISION
HYGIENE/GROOMING: INDEPENDENT
LAUNDRY: WITH SUPERVISION

## 2021-08-29 ASSESSMENT — MIFFLIN-ST. JEOR: SCORE: 1757.95

## 2021-08-29 NOTE — PLAN OF CARE
Patient continues to wet his bed, but refuses to wear pull up's at night. Staff talked him in getting into shower but came right out  and he was dry.. He said he took a shower but he did not. Staff changed his linen and picked up room.  He was medication compliant, appetite good.  No cussing or outbursts today. Sitting out in lounge but stays to self, little to say to this staff Or peers.

## 2021-08-29 NOTE — PLAN OF CARE
Patient has spent majority of the shift in the milieu. Patient denies suicidal ideation and self injurious thoughts. Denies anxiety and depression. Denies auditory and visual hallucinations. Ate dinner. Med compliant. Continues to be distracted and talking to himself.     Patient evaluation continues. Assessed mood,anxiety,thoughts and behavior.     Patient gradually progressing towards goals.    Patient is encouraged to participate in groups and assisted to develop healthy coping skills.     VS reviewed: /89   Pulse 76   Temp 98  F (36.7  C) (Oral)   Resp 16   Ht 1.829 m (6')   Wt 89.4 kg (197 lb 1.6 oz)   SpO2 98%   BMI 26.73 kg/m      Length of stay: 59    Refer to daily team meeting notes for individualized plan of care. Nursing will continue to assess.

## 2021-08-30 PROCEDURE — 124N000002 HC R&B MH UMMC

## 2021-08-30 PROCEDURE — 250N000013 HC RX MED GY IP 250 OP 250 PS 637: Performed by: PHYSICIAN ASSISTANT

## 2021-08-30 PROCEDURE — 250N000013 HC RX MED GY IP 250 OP 250 PS 637: Performed by: PSYCHIATRY & NEUROLOGY

## 2021-08-30 PROCEDURE — 99232 SBSQ HOSP IP/OBS MODERATE 35: CPT | Performed by: PSYCHIATRY & NEUROLOGY

## 2021-08-30 RX ADMIN — METOPROLOL TARTRATE 25 MG: 25 TABLET, FILM COATED ORAL at 20:52

## 2021-08-30 RX ADMIN — DOCUSATE SODIUM 100 MG: 100 CAPSULE, LIQUID FILLED ORAL at 20:51

## 2021-08-30 RX ADMIN — BENZTROPINE MESYLATE 1 MG: 1 TABLET ORAL at 08:30

## 2021-08-30 RX ADMIN — POLYETHYLENE GLYCOL 3350 17 G: 17 POWDER, FOR SOLUTION ORAL at 08:30

## 2021-08-30 RX ADMIN — RIVASTIGMINE 1 PATCH: 4.6 PATCH TRANSDERMAL at 08:38

## 2021-08-30 RX ADMIN — ASPIRIN 81 MG CHEWABLE TABLET 81 MG: 81 TABLET CHEWABLE at 08:30

## 2021-08-30 RX ADMIN — SALMETEROL XINAFOATE 1 PUFF: 50 POWDER, METERED ORAL; RESPIRATORY (INHALATION) at 08:39

## 2021-08-30 RX ADMIN — RISPERIDONE 1 MG: 1 TABLET, ORALLY DISINTEGRATING ORAL at 03:10

## 2021-08-30 RX ADMIN — MEMANTINE 10 MG: 5 TABLET ORAL at 08:30

## 2021-08-30 RX ADMIN — Medication 50 MCG: at 08:30

## 2021-08-30 RX ADMIN — BENZTROPINE MESYLATE 1 MG: 1 TABLET ORAL at 20:52

## 2021-08-30 RX ADMIN — DOCUSATE SODIUM 100 MG: 100 CAPSULE, LIQUID FILLED ORAL at 08:30

## 2021-08-30 RX ADMIN — ATORVASTATIN CALCIUM 80 MG: 80 TABLET, FILM COATED ORAL at 20:52

## 2021-08-30 RX ADMIN — METOPROLOL TARTRATE 25 MG: 25 TABLET, FILM COATED ORAL at 08:30

## 2021-08-30 RX ADMIN — LEVOTHYROXINE SODIUM 88 MCG: 88 TABLET ORAL at 08:30

## 2021-08-30 RX ADMIN — CLOZAPINE 300 MG: 100 TABLET ORAL at 20:52

## 2021-08-30 RX ADMIN — MELATONIN TAB 3 MG 3 MG: 3 TAB at 03:10

## 2021-08-30 RX ADMIN — MEMANTINE 10 MG: 5 TABLET ORAL at 20:52

## 2021-08-30 RX ADMIN — SALMETEROL XINAFOATE 1 PUFF: 50 POWDER, METERED ORAL; RESPIRATORY (INHALATION) at 20:56

## 2021-08-30 RX ADMIN — GUAIFENESIN 600 MG: 600 TABLET, EXTENDED RELEASE ORAL at 20:53

## 2021-08-30 RX ADMIN — GUAIFENESIN 600 MG: 600 TABLET, EXTENDED RELEASE ORAL at 08:30

## 2021-08-30 NOTE — PROGRESS NOTES
Pt appears to have difficulty staying asleep at this time.  He has been resting in bed but will occasionally come out of his room to walk the hallway.  Staff asked if he needed assistance and Pt declined.  Pt's clothes appear dry at this time.  Appears mumbling and preoccupied with IS.    At 2:30 am Pt approached staff and requested something for sleep.  He denies a/v halls at this time but did say he would like to take PRN Risperdal with melatonin.  Pt went back to bed after taking PRN.

## 2021-08-30 NOTE — PROGRESS NOTES
"[facility], [place]   Psychiatric Progress Note        Interim History:   The patient's care was discussed with the treatment team during the daily team meeting and/or staff's chart notes were reviewed.  Staff report patient is doing fairly well on the unit. Has been incontinent at times. Slept 4.25 hours.     The patient reports that he is \"all right.\" Reports that he is sleeping and eating well. Denies SI or HI. Denies any hallucinations. No concerns at this time.          Medications:       aspirin  81 mg Oral Daily     atorvastatin  80 mg Oral At Bedtime     benztropine  1 mg Oral BID     cholecalciferol  1,250 mcg Oral Q7 Days     cloZAPine  300 mg Oral At Bedtime     docusate sodium  100 mg Oral BID     guaiFENesin  600 mg Oral BID     levothyroxine  88 mcg Oral Daily     memantine  10 mg Oral BID     metoprolol tartrate  25 mg Oral BID     polyethylene glycol  17 g Oral Daily     rivastigmine  1 patch Transdermal Daily     rivastigmine   Transdermal Q8H     salmeterol  1 puff Inhalation BID     vitamin D3  50 mcg Oral Daily          Allergies:     Allergies   Allergen Reactions     Ibuprofen      Latex           Labs:   No results found for this or any previous visit (from the past 24 hour(s)).       Psychiatric Examination:     /83 (BP Location: Left arm)   Pulse 96   Temp 97.5  F (36.4  C) (Oral)   Resp 16   Ht 1.829 m (6')   Wt 89.5 kg (197 lb 4.8 oz)   SpO2 95%   BMI 26.76 kg/m    Weight is 197 lbs 4.8 oz  Body mass index is 26.76 kg/m .  Orthostatic Vitals       Most Recent      Sitting Orthostatic /90 08/25 0903    Sitting Orthostatic Pulse (bpm) 92 08/25 0903    Standing Orthostatic /87 08/25 0903    Standing Orthostatic Pulse (bpm) 109 08/25 0903            Appearance: awake, alert and adequately groomed  Attitude:  more cooperative  Eye Contact:  poor   Mood:  \"all right\"  Affect:  intensity is blunted  Speech:  mumbling  Psychomotor Behavior:  no " evidence of tardive dyskinesia, dystonia, or tics  Thought Process:  goal oriented  Associations:  no loose associations  Thought Content:  no evidence of suicidal ideation or homicidal ideation and obsessions present  Insight:  limited  Judgement:  limited  Oriented to:  person only  Attention Span and Concentration:  fair  Recent and Remote Memory:  poor    Clinical Global Impressions  First:  Considering your total clinical experience with this particular patient population, how severe are the patient's symptoms at this time?: 7 (07/01/21 0630)  Compared to the patient's condition at the START of treatment, this patient's condition is: 4 (07/01/21 0630)  Most recent:  Considering your total clinical experience with this particular patient population, how severe are the patient's symptoms at this time?: 6 (08/23/21 1705)  Compared to the patient's condition at the START of treatment, this patient's condition is: 3 (08/23/21 1705)         Precautions:     Behavioral Orders   Procedures     Assault precautions     Code 2     Elopement precautions     Routine Programming     As clinically indicated     Single Room     Status 15     Every 15 minutes.          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   Stimulant use disorder, in remission.          Plan:     Assessment and hospital summary:  The patient is a 54yo male with a history of schizophrenia and TBI and multiple medical co-morbidities as above who was admitted after being transferred from [facility] medical floor after a nearly year-long stay. Is currently under commitment with [facility] recently amended to include Clozaril. While at [facility] was noted to have ongoing agitation and frequently attempting to elope from unit requiring 1:1 staffing for safety. He was started on 1:1 staff  upon transfer, this was discontinued as patient has been more cooperative without elopement attempts. IM consult completed on admission and continued to follow due to elevated LFTs. Haldol and VPA discontinued due to LFTs. Cross titration completed from risperidone to clozaril after Yanez amended. EPSE/TD movements noted, have appeared stable over past few weeks. Patient has continued to have disorganization and active psychosis symptoms which appear to be at patients baseline. Team continues to work on disposition which barriers include patient has no guardian or next of kin willing to act as surrogate decision maker, see CTC notes for complete details.      Psychiatric treatment/inteventions:  Medications:   -continue Clozaril 300mg at bedtime for psychosis and agitation. Will not adjust dose further at this time, movements consistent with TD noted, and level in therapeutic range   -continue benztropine 1mg BID for EPSE  -continue memantine 5mg daily for neurocognitive disorder  -continue risperdal 1mg Q6H PRN agitation   -continue lorazepam 0.5-1.0mg Q4H PRN anxiety or severe agitation  -continue rivastigmine patch started 8/2 to target neurocognitive disorder       Disposition Plan   Reason for ongoing admission: is unable to care for self due to severe psychosis or mariusz  Discharge location: SNF  Discharge Medications: not ordered  Follow-up Appointments: not scheduled  Legal Status: full commitment    Entered by: Wilton Morfin on 8/30/21 at 11:01 AM

## 2021-08-30 NOTE — PLAN OF CARE
Problem: Psychotic Symptoms  Goal: Psychotic Symptoms  Description: Signs and symptoms of listed problems will be absent or manageable.  Outcome: No Change       Pt is oriented to self only.  Pt came out for meals, good appetite.  Pt denies auditory and visual hallucinations.  Pt denies all mental health symptoms.  Pt spent sometime sitting in the lounge watching TV.  Pt appears talking to himself.  Will continue to assess.

## 2021-08-30 NOTE — PLAN OF CARE
Patient has spent majority of the shift in the milieu. Denies SI and SIB. Denies auditory and visual hallucinations. Denies anxiety and depression. Med compliant. Ate dinner. Cooperative and pleasant on the unit.    Patient evaluation continues. Assessed mood,anxiety,thoughts and behavior.     Patient gradually progressing towards goals.    Patient is encouraged to participate in groups and assisted to develop healthy coping skills.     VS reviewed: /83 (BP Location: Left arm)   Pulse 96   Temp 97.2  F (36.2  C) (Oral)   Resp 16   Ht 1.829 m (6')   Wt 89.5 kg (197 lb 4.8 oz)   SpO2 95%   BMI 26.76 kg/m      Length of stay: 60    Refer to daily team meeting notes for individualized plan of care. Nursing will continue to assess.

## 2021-08-30 NOTE — PLAN OF CARE
Assessment/Intervention/Current Symtoms and Care Coordination  -Chart review  -Pre round meeting with team  -Rounded with team, addressed patient needs/concerns  -Post round meeting with team  Current Symptoms include the following: Psychosis    Discharge Plan or Goal  Pending stabilization & development of a safe discharge plan.  Considerations include:  locked nursing home    Barriers to Discharge  No guardianship    Referral Status  Nursing homes will not take the pt  without a guardian    Legal Status  Patient is under MI commitment in [facility]

## 2021-08-31 PROCEDURE — 250N000013 HC RX MED GY IP 250 OP 250 PS 637: Performed by: PSYCHIATRY & NEUROLOGY

## 2021-08-31 PROCEDURE — 250N000013 HC RX MED GY IP 250 OP 250 PS 637: Performed by: PHYSICIAN ASSISTANT

## 2021-08-31 PROCEDURE — 124N000002 HC R&B MH UMMC

## 2021-08-31 PROCEDURE — 99232 SBSQ HOSP IP/OBS MODERATE 35: CPT | Performed by: PSYCHIATRY & NEUROLOGY

## 2021-08-31 RX ADMIN — ATORVASTATIN CALCIUM 80 MG: 80 TABLET, FILM COATED ORAL at 20:55

## 2021-08-31 RX ADMIN — GUAIFENESIN 600 MG: 600 TABLET, EXTENDED RELEASE ORAL at 09:02

## 2021-08-31 RX ADMIN — CLOZAPINE 300 MG: 100 TABLET ORAL at 20:55

## 2021-08-31 RX ADMIN — RIVASTIGMINE 1 PATCH: 4.6 PATCH TRANSDERMAL at 09:02

## 2021-08-31 RX ADMIN — SALMETEROL XINAFOATE 1 PUFF: 50 POWDER, METERED ORAL; RESPIRATORY (INHALATION) at 20:56

## 2021-08-31 RX ADMIN — GUAIFENESIN 600 MG: 600 TABLET, EXTENDED RELEASE ORAL at 20:56

## 2021-08-31 RX ADMIN — BENZTROPINE MESYLATE 1 MG: 1 TABLET ORAL at 20:55

## 2021-08-31 RX ADMIN — METOPROLOL TARTRATE 25 MG: 25 TABLET, FILM COATED ORAL at 09:02

## 2021-08-31 RX ADMIN — BENZTROPINE MESYLATE 1 MG: 1 TABLET ORAL at 09:02

## 2021-08-31 RX ADMIN — MEMANTINE 10 MG: 5 TABLET ORAL at 09:02

## 2021-08-31 RX ADMIN — METOPROLOL TARTRATE 25 MG: 25 TABLET, FILM COATED ORAL at 20:56

## 2021-08-31 RX ADMIN — SALMETEROL XINAFOATE 1 PUFF: 50 POWDER, METERED ORAL; RESPIRATORY (INHALATION) at 09:09

## 2021-08-31 RX ADMIN — POLYETHYLENE GLYCOL 3350 17 G: 17 POWDER, FOR SOLUTION ORAL at 09:02

## 2021-08-31 RX ADMIN — MEMANTINE 10 MG: 5 TABLET ORAL at 20:56

## 2021-08-31 RX ADMIN — DOCUSATE SODIUM 100 MG: 100 CAPSULE, LIQUID FILLED ORAL at 09:02

## 2021-08-31 RX ADMIN — DOCUSATE SODIUM 100 MG: 100 CAPSULE, LIQUID FILLED ORAL at 20:56

## 2021-08-31 RX ADMIN — Medication 50 MCG: at 09:02

## 2021-08-31 RX ADMIN — ASPIRIN 81 MG CHEWABLE TABLET 81 MG: 81 TABLET CHEWABLE at 09:02

## 2021-08-31 RX ADMIN — LEVOTHYROXINE SODIUM 88 MCG: 88 TABLET ORAL at 09:02

## 2021-08-31 ASSESSMENT — ACTIVITIES OF DAILY LIVING (ADL)
HYGIENE/GROOMING: PROMPTS
DRESS: SCRUBS (BEHAVIORAL HEALTH)
ORAL_HYGIENE: PROMPTS

## 2021-08-31 NOTE — PLAN OF CARE
"Problem: Psychotic Symptoms  Goal: Psychotic Symptoms  Description: Signs and symptoms of listed problems will be absent or manageable.  Outcome: No Change     John [place] [facility] [place]. He is still occasionally engaging in self talk and responding to internal stimuli. He is disoriented to place and time, stating he thinks he is at \"[facility]\" and that the year is 2099. He asked writer to order two pizzas but was receptive to redirection. He is disheveled.    He denies SI/HI. He refused Depends when writer offered them for his NOC incontinence, stating \"no, get outta here.\" Depends left in bedroom on bed to encourage him to wear them.  "

## 2021-08-31 NOTE — PLAN OF CARE
Assessment/Intervention/Current Symtoms and Care Coordination  -Chart review  -Pre round meeting with team  -Rounded with team, addressed patient needs/concerns  -Post round meeting with team  Current Symptoms include the following: Psychosis    Discharge Plan or Goal  Considerations include:  locked nursing  home    Barriers to Discharge  No guardianship    Referral Status  Unable to rfer to nursing homes as no guardian    Legal Status  Patient is under MI commitment in [facility]

## 2021-08-31 NOTE — PLAN OF CARE
Problem: Psychotic Symptoms  Goal: Social and Therapeutic (Psychotic Symptoms)  Description: Signs and symptoms of listed problems will be absent or manageable.  Recent Flowsheet Documentation  Taken 8/31/2021 2593 by Radha Donnelly RN  Psychotic Symptoms Assessed: all  Psychotic Symptoms Present:    memory deficits    thought process      Pt was incontinent of urine this morning.  Bed linen changed and pt prompted to take a shower.  Pt was able to complete the shower with staff prompting him to shower.  Pt's affect is flat blunted, oriented to self only.  Good appetite and was med compliant. Pt denies all mental health symptoms.  Will continue to assess.

## 2021-08-31 NOTE — PLAN OF CARE
Night Shift Summary (8/30/21 into 08/31/21)    Pt in bed sleeping at start of shift. Breathing quiet and unlabored. Appears to have slept 7 hours.    Assault and Elopement precautions in addition to No Roommate order; any related events noted above.     Will continue to monitor and assess.       Problem: Behavioral Health Plan of Care  Goal: Absence of New-Onset Illness or Injury  Outcome: Improving     Problem: Sleep Disturbance  Goal: Adequate Sleep/Rest  Outcome: Improving

## 2021-08-31 NOTE — PROGRESS NOTES
"[facility], [place]   Psychiatric Progress Note        Interim History:   The patient's care was discussed with the treatment team during the daily team meeting and/or staff's chart notes were reviewed.  Staff report patient is okay. Slept 7 hours. Was incontinent this morning. Still refusing to wear briefs.     The patient reports that he is \"okay.\" Denies problems with sleep or appetite. Denies SI or HI. Denies AH or VH.          Medications:       aspirin  81 mg Oral Daily     atorvastatin  80 mg Oral At Bedtime     benztropine  1 mg Oral BID     cholecalciferol  1,250 mcg Oral Q7 Days     cloZAPine  300 mg Oral At Bedtime     docusate sodium  100 mg Oral BID     guaiFENesin  600 mg Oral BID     levothyroxine  88 mcg Oral Daily     memantine  10 mg Oral BID     metoprolol tartrate  25 mg Oral BID     polyethylene glycol  17 g Oral Daily     rivastigmine  1 patch Transdermal Daily     rivastigmine   Transdermal Q8H     salmeterol  1 puff Inhalation BID     vitamin D3  50 mcg Oral Daily          Allergies:     Allergies   Allergen Reactions     Ibuprofen      Latex           Labs:   No results found for this or any previous visit (from the past 24 hour(s)).       Psychiatric Examination:     /76   Pulse 101   Temp 97.6  F (36.4  C) (Oral)   Resp 16   Ht 1.829 m (6')   Wt 89.5 kg (197 lb 4.8 oz)   SpO2 95%   BMI 26.76 kg/m    Weight is 197 lbs 4.8 oz  Body mass index is 26.76 kg/m .  Orthostatic Vitals       Most Recent      Sitting Orthostatic /90 08/25 0903    Sitting Orthostatic Pulse (bpm) 92 08/25 0903    Standing Orthostatic /87 08/25 0903    Standing Orthostatic Pulse (bpm) 109 08/25 0903            Appearance: fatigued  Attitude:  somewhat cooperative  Eye Contact:  poor   Mood:  \"okay\"  Affect:  intensity is blunted  Speech:  mumbling  Psychomotor Behavior:  no evidence of tardive dyskinesia, dystonia, or tics  Thought Process:  goal " oriented  Associations:  no loose associations  Thought Content:  no evidence of suicidal ideation or homicidal ideation and obsessions present  Insight:  limited  Judgement:  limited  Oriented to:  person only  Attention Span and Concentration:  fair  Recent and Remote Memory:  poor    Clinical Global Impressions  First:  Considering your total clinical experience with this particular patient population, how severe are the patient's symptoms at this time?: 7 (07/01/21 0630)  Compared to the patient's condition at the START of treatment, this patient's condition is: 4 (07/01/21 0630)  Most recent:  Considering your total clinical experience with this particular patient population, how severe are the patient's symptoms at this time?: 6 (08/23/21 1705)  Compared to the patient's condition at the START of treatment, this patient's condition is: 3 (08/23/21 1705)         Precautions:     Behavioral Orders   Procedures     Assault precautions     Code 2     Elopement precautions     Routine Programming     As clinically indicated     Single Room     Status 15     Every 15 minutes.          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   Stimulant use disorder, in remission.          Plan:     Assessment and hospital summary:  The patient is a 54yo male with a history of schizophrenia and TBI and multiple medical co-morbidities as above who was admitted after being transferred from [facility] medical [facility] after a nearly year-long stay. Is currently under commitment with Yanez recently amended to include Clozaril. While at [facility] was noted to have ongoing agitation and frequently attempting to elope from unit requiring 1:1 staffing for safety. He was started on 1:1 staff upon transfer, this was discontinued as patient has been more cooperative  without elopement attempts. IM consult completed on admission and continued to follow due to elevated LFTs. Haldol and VPA discontinued due to LFTs. Cross titration completed from risperidone to clozaril after Yanez amended. EPSE/TD movements noted, have appeared stable over past few weeks. Patient has continued to have disorganization and active psychosis symptoms which appear to be at patients baseline. Team continues to work on disposition which barriers include patient has no guardian or next of kin willing to act as surrogate decision maker, see CTC notes for complete details.      Psychiatric treatment/inteventions:  Medications:   -continue Clozaril 300mg at bedtime for psychosis and agitation. Will not adjust dose further at this time, movements consistent with TD noted, and level in therapeutic range   -continue benztropine 1mg BID for EPSE  -continue memantine 5mg daily for neurocognitive disorder  -continue risperdal 1mg Q6H PRN agitation   -continue lorazepam 0.5-1.0mg Q4H PRN anxiety or severe agitation  -continue rivastigmine patch started 8/2 to target neurocognitive disorder       Disposition Plan   Reason for ongoing admission: is unable to care for self due to severe psychosis or mariusz  Discharge location: SNF  Discharge Medications: not ordered  Follow-up Appointments: not scheduled  Legal Status: full commitment    Entered by: Wilton Morfin on 8/31/21 at 10:59 AM

## 2021-09-01 PROCEDURE — 250N000013 HC RX MED GY IP 250 OP 250 PS 637: Performed by: PSYCHIATRY & NEUROLOGY

## 2021-09-01 PROCEDURE — 250N000013 HC RX MED GY IP 250 OP 250 PS 637: Performed by: PHYSICIAN ASSISTANT

## 2021-09-01 PROCEDURE — 124N000002 HC R&B MH UMMC

## 2021-09-01 RX ADMIN — SALMETEROL XINAFOATE 1 PUFF: 50 POWDER, METERED ORAL; RESPIRATORY (INHALATION) at 08:25

## 2021-09-01 RX ADMIN — CLOZAPINE 300 MG: 100 TABLET ORAL at 21:17

## 2021-09-01 RX ADMIN — POLYETHYLENE GLYCOL 3350 17 G: 17 POWDER, FOR SOLUTION ORAL at 08:24

## 2021-09-01 RX ADMIN — ATORVASTATIN CALCIUM 80 MG: 80 TABLET, FILM COATED ORAL at 21:18

## 2021-09-01 RX ADMIN — DOCUSATE SODIUM 100 MG: 100 CAPSULE, LIQUID FILLED ORAL at 21:17

## 2021-09-01 RX ADMIN — METOPROLOL TARTRATE 25 MG: 25 TABLET, FILM COATED ORAL at 08:24

## 2021-09-01 RX ADMIN — RIVASTIGMINE 1 PATCH: 4.6 PATCH TRANSDERMAL at 08:25

## 2021-09-01 RX ADMIN — MEMANTINE 10 MG: 5 TABLET ORAL at 21:18

## 2021-09-01 RX ADMIN — DOCUSATE SODIUM 100 MG: 100 CAPSULE, LIQUID FILLED ORAL at 08:25

## 2021-09-01 RX ADMIN — MEMANTINE 10 MG: 5 TABLET ORAL at 08:25

## 2021-09-01 RX ADMIN — BENZTROPINE MESYLATE 1 MG: 1 TABLET ORAL at 08:25

## 2021-09-01 RX ADMIN — GUAIFENESIN 600 MG: 600 TABLET, EXTENDED RELEASE ORAL at 08:24

## 2021-09-01 RX ADMIN — SALMETEROL XINAFOATE 1 PUFF: 50 POWDER, METERED ORAL; RESPIRATORY (INHALATION) at 21:19

## 2021-09-01 RX ADMIN — LEVOTHYROXINE SODIUM 88 MCG: 88 TABLET ORAL at 08:25

## 2021-09-01 RX ADMIN — METOPROLOL TARTRATE 25 MG: 25 TABLET, FILM COATED ORAL at 21:18

## 2021-09-01 RX ADMIN — Medication 50 MCG: at 08:24

## 2021-09-01 RX ADMIN — ASPIRIN 81 MG CHEWABLE TABLET 81 MG: 81 TABLET CHEWABLE at 08:24

## 2021-09-01 RX ADMIN — BENZTROPINE MESYLATE 1 MG: 1 TABLET ORAL at 21:18

## 2021-09-01 RX ADMIN — GUAIFENESIN 600 MG: 600 TABLET, EXTENDED RELEASE ORAL at 21:18

## 2021-09-01 ASSESSMENT — ACTIVITIES OF DAILY LIVING (ADL)
ORAL_HYGIENE: PROMPTS
DRESS: SCRUBS (BEHAVIORAL HEALTH)
LAUNDRY: UNABLE TO COMPLETE
HYGIENE/GROOMING: PROMPTS

## 2021-09-01 NOTE — PLAN OF CARE
Assessment/Intervention/Current Symtoms and Care Coordination  -Chart review  -Pre round meeting with team  -Rounded with team, addressed patient needs/concerns  -Post round meeting with team  Current Symptoms include the following: Psychosis    Discharge Plan or Goal  Pending stabilization & development of a safe discharge plan.  Considerations include:  memory care unit    Barriers to Discharge  They have not been able to find a guardian.    Referral Status  Guardianship and  memory care unit    Legal Status  Patient is under MI commitment in [facility]

## 2021-09-01 NOTE — PLAN OF CARE
Problem: Psychotic Symptoms  Goal: Psychotic Symptoms  Description: Signs and symptoms of listed problems will be absent or manageable.  Outcome: No Change      Pt has been visible in the milieu.  Pt spent some time out in the lounge area, pt appears responding, talks to himself.  Pt denies all mental health symptoms.  Pt was med compliant.  Calm and co-operative on the unit.   Will continue to assess.

## 2021-09-01 NOTE — PLAN OF CARE
Pt is presents as paranoid and some what labile. He states he is only on a commitment so Bridgette Montague can steal all his millions and the duffle bags of cash in his Cadillacs. He wants to discharge to one of his mansions and prefers either the one in [place] or off the [facility]. He yelled and swore about the commitment but it was brief and he went back to enjoying his coffee.   He remains disoriented to date and place.   Cooperative with medication.   Went to bed early.   Declined to get up for the bathroom after given meds despite prompts. Also would not put on an attends.   Denies SI. Responding to hallucinations.

## 2021-09-01 NOTE — PLAN OF CARE
Night Shift Summary (8/31/21 into 09/01/21)    Pt in bed sleeping at start of shift. Breathing quiet and unlabored. Appears to have slept 6.25 hours.    Assault and Elopement precautions in addition to Single Room order; any related events noted above.     Will continue to monitor and assess.       Problem: Behavioral Health Plan of Care  Goal: Absence of New-Onset Illness or Injury  Outcome: Improving     Problem: Sleep Disturbance  Goal: Adequate Sleep/Rest  Outcome: Improving

## 2021-09-02 PROCEDURE — 250N000013 HC RX MED GY IP 250 OP 250 PS 637: Performed by: PHYSICIAN ASSISTANT

## 2021-09-02 PROCEDURE — 124N000002 HC R&B MH UMMC

## 2021-09-02 PROCEDURE — 250N000013 HC RX MED GY IP 250 OP 250 PS 637: Performed by: PSYCHIATRY & NEUROLOGY

## 2021-09-02 PROCEDURE — 99232 SBSQ HOSP IP/OBS MODERATE 35: CPT | Performed by: PSYCHIATRY & NEUROLOGY

## 2021-09-02 RX ADMIN — LEVOTHYROXINE SODIUM 88 MCG: 88 TABLET ORAL at 09:07

## 2021-09-02 RX ADMIN — SALMETEROL XINAFOATE 1 PUFF: 50 POWDER, METERED ORAL; RESPIRATORY (INHALATION) at 21:19

## 2021-09-02 RX ADMIN — METOPROLOL TARTRATE 25 MG: 25 TABLET, FILM COATED ORAL at 09:08

## 2021-09-02 RX ADMIN — METOPROLOL TARTRATE 25 MG: 25 TABLET, FILM COATED ORAL at 21:18

## 2021-09-02 RX ADMIN — ASPIRIN 81 MG CHEWABLE TABLET 81 MG: 81 TABLET CHEWABLE at 09:07

## 2021-09-02 RX ADMIN — ATORVASTATIN CALCIUM 80 MG: 80 TABLET, FILM COATED ORAL at 21:18

## 2021-09-02 RX ADMIN — MEMANTINE 10 MG: 5 TABLET ORAL at 09:07

## 2021-09-02 RX ADMIN — Medication 50 MCG: at 09:07

## 2021-09-02 RX ADMIN — GUAIFENESIN 600 MG: 600 TABLET, EXTENDED RELEASE ORAL at 21:18

## 2021-09-02 RX ADMIN — MEMANTINE 10 MG: 5 TABLET ORAL at 21:18

## 2021-09-02 RX ADMIN — BENZTROPINE MESYLATE 1 MG: 1 TABLET ORAL at 21:18

## 2021-09-02 RX ADMIN — DOCUSATE SODIUM 100 MG: 100 CAPSULE, LIQUID FILLED ORAL at 09:07

## 2021-09-02 RX ADMIN — GUAIFENESIN 600 MG: 600 TABLET, EXTENDED RELEASE ORAL at 09:07

## 2021-09-02 RX ADMIN — DOCUSATE SODIUM 100 MG: 100 CAPSULE, LIQUID FILLED ORAL at 21:18

## 2021-09-02 RX ADMIN — BENZTROPINE MESYLATE 1 MG: 1 TABLET ORAL at 09:07

## 2021-09-02 RX ADMIN — SALMETEROL XINAFOATE 1 PUFF: 50 POWDER, METERED ORAL; RESPIRATORY (INHALATION) at 09:11

## 2021-09-02 RX ADMIN — RIVASTIGMINE 1 PATCH: 4.6 PATCH TRANSDERMAL at 09:06

## 2021-09-02 RX ADMIN — CLOZAPINE 300 MG: 100 TABLET ORAL at 21:17

## 2021-09-02 RX ADMIN — POLYETHYLENE GLYCOL 3350 17 G: 17 POWDER, FOR SOLUTION ORAL at 09:06

## 2021-09-02 RX ADMIN — CHOLECALCIFEROL CAP 1.25 MG (50000 UNIT) 1250 MCG: 1.25 CAP at 09:16

## 2021-09-02 ASSESSMENT — ACTIVITIES OF DAILY LIVING (ADL)
ORAL_HYGIENE: INDEPENDENT
DRESS: INDEPENDENT
HYGIENE/GROOMING: INDEPENDENT
DRESS: INDEPENDENT
LAUNDRY: WITH SUPERVISION
ORAL_HYGIENE: INDEPENDENT
LAUNDRY: WITH SUPERVISION
HYGIENE/GROOMING: INDEPENDENT

## 2021-09-02 NOTE — PLAN OF CARE
Assessment/Intervention/Current Symtoms and Care Coordination  -Chart review  -Pre round meeting with team  -Rounded with team, addressed patient needs/concerns  -Post round meeting with team  Current Symptoms include the following: Psychosis        Discharge Plan or Goal  Pending stabilization & development of a safe discharge plan.  Considerations include: memory care unit    Barriers to Discharge  No guardianship is available to the pt and this is required for nursing home placement    Referral Status  Guardian and Nursing Home    Legal Status  Patient is under MI commitment in [facility]

## 2021-09-02 NOTE — PLAN OF CARE
"Problem: Psychotic Symptoms  Goal: Psychotic Symptoms  Description: Signs and symptoms of listed problems will be absent or manageable.  Outcome: No Change     John spent the majority of the shift in the milieu watching TV. He is withdrawn. He responds to internal stimuli and laughs to himself frequently. When asked if he is hearing the voices in his head, he laughed and said \"oh yeah, always.\" He remained behaviorally in control this shift. He denies HI/SI.    He continues to have NOC incontinence. Writer gave him briefs when he was going into the bathroom around 2200 and he said he would put them on. An extra pair is in his room as well.          "

## 2021-09-02 NOTE — PLAN OF CARE
BEHAVIORAL TEAM DISCUSSION    Participants:   Dr. Morfin, Marylu Fang [facility], Pilar Castaneda    Progress:  Pt is here on day 64.  There are no behavioral management issues.  Pt is incontinent and declines to wear depends.  Room has an odor and is in disarray.  PT has been asking to order pizza.    Anticipated length of stay:   3 months    Continued Stay Criteria/Rationale:   The pt is not able to care for self and needs a structures placement which is not available.    Medical/Physical:   No active issues were discussed    Precautions:   Behavioral Orders   Procedures     Assault precautions     Code 2     Elopement precautions     Routine Programming     As clinically indicated     Single Room     Status 15     Every 15 minutes.     Plan:   [facility] is being approached to resolve guardianship issue  Continue to encourage pt to wear depends  Staff is contacting rep  payee to see if the pt can have pizza ordered.    Rationale for change in precautions or plan: no change

## 2021-09-02 NOTE — PLAN OF CARE
Night Shift Summary (9/1/21 into 09/02/21)    Pt in bed sleeping at start of shift. Breathing quiet and unlabored. Appears to have slept 7 hours.    Assault and Elopement precautions in addition to Single Room order; any related events noted above.     Will continue to monitor and assess.       Problem: Behavioral Health Plan of Care  Goal: Absence of New-Onset Illness or Injury  Outcome: Improving     Problem: Sleep Disturbance  Goal: Adequate Sleep/Rest  Outcome: Improving

## 2021-09-02 NOTE — PROGRESS NOTES
"[facility], [place]   Psychiatric Progress Note        Interim History:   The patient's care was discussed with the treatment team during the daily team meeting and/or staff's chart notes were reviewed.  Staff report patient did wear briefs last night. Was reporting AH.     The patient reports that he is \"okay.\" Denies problems with sleep or appetite. Denies SI or HI. Denies current AH or VH. Patient does say \"you need to get this commitment the f**k over with.\"          Medications:       aspirin  81 mg Oral Daily     atorvastatin  80 mg Oral At Bedtime     benztropine  1 mg Oral BID     cholecalciferol  1,250 mcg Oral Q7 Days     cloZAPine  300 mg Oral At Bedtime     docusate sodium  100 mg Oral BID     guaiFENesin  600 mg Oral BID     levothyroxine  88 mcg Oral Daily     memantine  10 mg Oral BID     metoprolol tartrate  25 mg Oral BID     polyethylene glycol  17 g Oral Daily     rivastigmine  1 patch Transdermal Daily     rivastigmine   Transdermal Q8H     salmeterol  1 puff Inhalation BID     vitamin D3  50 mcg Oral Daily          Allergies:     Allergies   Allergen Reactions     Ibuprofen      Latex           Labs:   No results found for this or any previous visit (from the past 24 hour(s)).       Psychiatric Examination:     /85 (BP Location: Left arm)   Pulse 94   Temp 97.6  F (36.4  C) (Oral)   Resp 16   Ht 1.829 m (6')   Wt 89.5 kg (197 lb 4.8 oz)   SpO2 97%   BMI 26.76 kg/m    Weight is 197 lbs 4.8 oz  Body mass index is 26.76 kg/m .  Orthostatic Vitals       Most Recent      Sitting Orthostatic /90 08/25 0903    Sitting Orthostatic Pulse (bpm) 92 08/25 0903    Standing Orthostatic /87 08/25 0903    Standing Orthostatic Pulse (bpm) 109 08/25 0903            Appearance: awake, alert  Attitude:  somewhat cooperative  Eye Contact:  poor   Mood:  \"okay\"  Affect:  intensity is blunted  Speech:  clear, coherent  Psychomotor Behavior:  no evidence of tardive " dyskinesia, dystonia, or tics  Thought Process:  goal oriented  Associations:  no loose associations  Thought Content:  no evidence of suicidal ideation or homicidal ideation and obsessions present  Insight:  limited  Judgement:  limited  Oriented to:  person only  Attention Span and Concentration:  fair  Recent and Remote Memory:  poor    Clinical Global Impressions  First:  Considering your total clinical experience with this particular patient population, how severe are the patient's symptoms at this time?: 7 (07/01/21 0630)  Compared to the patient's condition at the START of treatment, this patient's condition is: 4 (07/01/21 0630)  Most recent:  Considering your total clinical experience with this particular patient population, how severe are the patient's symptoms at this time?: 6 (08/23/21 1705)  Compared to the patient's condition at the START of treatment, this patient's condition is: 3 (08/23/21 1705)         Precautions:     Behavioral Orders   Procedures     Assault precautions     Code 2     Elopement precautions     Routine Programming     As clinically indicated     Single Room     Status 15     Every 15 minutes.          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   Stimulant use disorder, in remission.          Plan:     Assessment and hospital summary:  The patient is a 54yo male with a history of schizophrenia and TBI and multiple medical co-morbidities as above who was admitted after being transferred from [facility] medical [facility] after a nearly year-long stay. Is currently under commitment with Yanez recently amended to include Clozaril. While at [facility] was noted to have ongoing agitation and frequently attempting to elope from unit requiring 1:1 staffing for safety. He was started on 1:1 staff upon transfer, this  was discontinued as patient has been more cooperative without elopement attempts. IM consult completed on admission and continued to follow due to elevated LFTs. Haldol and VPA discontinued due to LFTs. Cross titration completed from risperidone to clozaril after Yanez amended. EPSE/TD movements noted, have appeared stable over past few weeks. Patient has continued to have disorganization and active psychosis symptoms which appear to be at patients baseline. Team continues to work on disposition which barriers include patient has no guardian or next of kin willing to act as surrogate decision maker, see CTC notes for complete details.      Psychiatric treatment/inteventions:  Medications:   -continue Clozaril 300mg at bedtime for psychosis and agitation. Will not adjust dose further at this time, movements consistent with TD noted, and level in therapeutic range   -continue benztropine 1mg BID for EPSE  -continue memantine 5mg daily for neurocognitive disorder  -continue risperdal 1mg Q6H PRN agitation   -continue lorazepam 0.5-1.0mg Q4H PRN anxiety or severe agitation  -continue rivastigmine patch started 8/2 to target neurocognitive disorder       Disposition Plan   Reason for ongoing admission: is unable to care for self due to severe psychosis or mariusz  Discharge location: SNF  Discharge Medications: not ordered  Follow-up Appointments: not scheduled  Legal Status: full commitment    Entered by: Wilton Morfin on 9/2/21 at 2:56 PM

## 2021-09-02 NOTE — PLAN OF CARE
Patient has spent majority of the shift in the milieu. Patient denies suicidal ideation  and self injurious thoughts. States that he is experiencing auditory hallucinations. Ate meals. Patient was incontinent this morning. Linen was changed and patient showered and changed into new scrubs and sweater. Observed talking to himself. Keeps to himself. Cooperative and pleasant on approach.     Patient evaluation continues. Assessed mood,anxiety,thoughts and behavior.     Patient gradually progressing towards goals.    Patient is encouraged to participate in groups and assisted to develop healthy coping skills.     VS reviewed: /85 (BP Location: Left arm)   Pulse 94   Temp 97.6  F (36.4  C) (Oral)   Resp 16   Ht 1.829 m (6')   Wt 89.5 kg (197 lb 4.8 oz)   SpO2 97%   BMI 26.76 kg/m      Length of stay: 64    Refer to daily team meeting notes for individualized plan of care. Nursing will continue to assess.

## 2021-09-03 LAB
BASOPHILS # BLD AUTO: 0.1 10E3/UL (ref 0–0.2)
BASOPHILS NFR BLD AUTO: 1 %
EOSINOPHIL # BLD AUTO: 1.1 10E3/UL (ref 0–0.7)
EOSINOPHIL NFR BLD AUTO: 16 %
ERYTHROCYTE [DISTWIDTH] IN BLOOD BY AUTOMATED COUNT: 13.2 % (ref 10–15)
HCT VFR BLD AUTO: 45 % (ref 40–53)
HGB BLD-MCNC: 15.6 G/DL (ref 13.3–17.7)
IMM GRANULOCYTES # BLD: 0 10E3/UL
IMM GRANULOCYTES NFR BLD: 1 %
LYMPHOCYTES # BLD AUTO: 1.4 10E3/UL (ref 0.8–5.3)
LYMPHOCYTES NFR BLD AUTO: 22 %
MCH RBC QN AUTO: 32 PG (ref 26.5–33)
MCHC RBC AUTO-ENTMCNC: 34.7 G/DL (ref 31.5–36.5)
MCV RBC AUTO: 92 FL (ref 78–100)
MONOCYTES # BLD AUTO: 0.7 10E3/UL (ref 0–1.3)
MONOCYTES NFR BLD AUTO: 10 %
NEUTROPHILS # BLD AUTO: 3.3 10E3/UL (ref 1.6–8.3)
NEUTROPHILS NFR BLD AUTO: 50 %
NRBC # BLD AUTO: 0 10E3/UL
NRBC BLD AUTO-RTO: 0 /100
PLATELET # BLD AUTO: 207 10E3/UL (ref 150–450)
RBC # BLD AUTO: 4.87 10E6/UL (ref 4.4–5.9)
SARS-COV-2 RNA RESP QL NAA+PROBE: NEGATIVE
WBC # BLD AUTO: 6.5 10E3/UL (ref 4–11)

## 2021-09-03 PROCEDURE — 250N000013 HC RX MED GY IP 250 OP 250 PS 637: Performed by: PSYCHIATRY & NEUROLOGY

## 2021-09-03 PROCEDURE — 250N000013 HC RX MED GY IP 250 OP 250 PS 637: Performed by: PHYSICIAN ASSISTANT

## 2021-09-03 PROCEDURE — 99232 SBSQ HOSP IP/OBS MODERATE 35: CPT | Performed by: PSYCHIATRY & NEUROLOGY

## 2021-09-03 PROCEDURE — 85025 COMPLETE CBC W/AUTO DIFF WBC: CPT | Performed by: PSYCHIATRY & NEUROLOGY

## 2021-09-03 PROCEDURE — 36415 COLL VENOUS BLD VENIPUNCTURE: CPT | Performed by: PSYCHIATRY & NEUROLOGY

## 2021-09-03 PROCEDURE — U0005 INFEC AGEN DETEC AMPLI PROBE: HCPCS | Performed by: PSYCHIATRY & NEUROLOGY

## 2021-09-03 PROCEDURE — 124N000002 HC R&B MH UMMC

## 2021-09-03 RX ADMIN — SALMETEROL XINAFOATE 1 PUFF: 50 POWDER, METERED ORAL; RESPIRATORY (INHALATION) at 08:53

## 2021-09-03 RX ADMIN — POLYETHYLENE GLYCOL 3350 17 G: 17 POWDER, FOR SOLUTION ORAL at 08:51

## 2021-09-03 RX ADMIN — DOCUSATE SODIUM 100 MG: 100 CAPSULE, LIQUID FILLED ORAL at 08:51

## 2021-09-03 RX ADMIN — ASPIRIN 81 MG CHEWABLE TABLET 81 MG: 81 TABLET CHEWABLE at 08:51

## 2021-09-03 RX ADMIN — ATORVASTATIN CALCIUM 80 MG: 80 TABLET, FILM COATED ORAL at 20:12

## 2021-09-03 RX ADMIN — GUAIFENESIN 600 MG: 600 TABLET, EXTENDED RELEASE ORAL at 08:51

## 2021-09-03 RX ADMIN — DOCUSATE SODIUM 100 MG: 100 CAPSULE, LIQUID FILLED ORAL at 20:11

## 2021-09-03 RX ADMIN — Medication 50 MCG: at 08:51

## 2021-09-03 RX ADMIN — SALMETEROL XINAFOATE 1 PUFF: 50 POWDER, METERED ORAL; RESPIRATORY (INHALATION) at 20:12

## 2021-09-03 RX ADMIN — MEMANTINE 10 MG: 5 TABLET ORAL at 20:11

## 2021-09-03 RX ADMIN — BENZTROPINE MESYLATE 1 MG: 1 TABLET ORAL at 08:51

## 2021-09-03 RX ADMIN — BENZTROPINE MESYLATE 1 MG: 1 TABLET ORAL at 20:11

## 2021-09-03 RX ADMIN — RIVASTIGMINE 1 PATCH: 4.6 PATCH TRANSDERMAL at 08:51

## 2021-09-03 RX ADMIN — METOPROLOL TARTRATE 25 MG: 25 TABLET, FILM COATED ORAL at 08:51

## 2021-09-03 RX ADMIN — LEVOTHYROXINE SODIUM 88 MCG: 88 TABLET ORAL at 08:51

## 2021-09-03 RX ADMIN — METOPROLOL TARTRATE 25 MG: 25 TABLET, FILM COATED ORAL at 20:11

## 2021-09-03 RX ADMIN — GUAIFENESIN 600 MG: 600 TABLET, EXTENDED RELEASE ORAL at 20:11

## 2021-09-03 RX ADMIN — MEMANTINE 10 MG: 5 TABLET ORAL at 08:51

## 2021-09-03 RX ADMIN — CLOZAPINE 300 MG: 100 TABLET ORAL at 20:12

## 2021-09-03 ASSESSMENT — ACTIVITIES OF DAILY LIVING (ADL)
HYGIENE/GROOMING: INDEPENDENT
ORAL_HYGIENE: PROMPTS
DRESS: SCRUBS (BEHAVIORAL HEALTH)
LAUNDRY: UNABLE TO COMPLETE

## 2021-09-03 NOTE — PLAN OF CARE
Assessment/Intervention/Current Symtoms and Care Coordination  -Chart review  -Pre round meeting with team  -Rounded with team, addressed patient needs/concerns  -Post round meeting with team  Current Symptoms include the following: Psychosis    Discharge Plan or Goal  Pending stabilization & development of a safe discharge plan.  Considerations include: locked nursing home    Barriers to Discharge  Patient requires further psychiatric stabilization due to current symptomology    Referral Status  guardian and nursing home    Legal Status  Patient is under MI commitment in [facility]

## 2021-09-03 NOTE — PROGRESS NOTES
"[facility], [place]   Psychiatric Progress Note        Interim History:   The patient's care was discussed with the treatment team during the daily team meeting and/or staff's chart notes were reviewed.  Staff report patient slept 7 hours. Was incontinent. Waiting to hear back from legal and risk.      The patient reports that he is \"okay.\" Denies problems with sleep or appetite. Denies SI or HI. Denies current AH or VH. Is more engaged today. Rambling at times about \"getting to my system\" and \"Dr. Ledezma.\"          Medications:       aspirin  81 mg Oral Daily     atorvastatin  80 mg Oral At Bedtime     benztropine  1 mg Oral BID     cholecalciferol  1,250 mcg Oral Q7 Days     cloZAPine  300 mg Oral At Bedtime     docusate sodium  100 mg Oral BID     guaiFENesin  600 mg Oral BID     levothyroxine  88 mcg Oral Daily     memantine  10 mg Oral BID     metoprolol tartrate  25 mg Oral BID     polyethylene glycol  17 g Oral Daily     rivastigmine  1 patch Transdermal Daily     rivastigmine   Transdermal Q8H     salmeterol  1 puff Inhalation BID     vitamin D3  50 mcg Oral Daily          Allergies:     Allergies   Allergen Reactions     Ibuprofen      Latex           Labs:     Recent Results (from the past 24 hour(s))   CBC with platelets and differential    Collection Time: 09/03/21  7:55 AM   Result Value Ref Range    WBC Count 6.5 4.0 - 11.0 10e3/uL    RBC Count 4.87 4.40 - 5.90 10e6/uL    Hemoglobin 15.6 13.3 - 17.7 g/dL    Hematocrit 45.0 40.0 - 53.0 %    MCV 92 78 - 100 fL    MCH 32.0 26.5 - 33.0 pg    MCHC 34.7 31.5 - 36.5 g/dL    RDW 13.2 10.0 - 15.0 %    Platelet Count 207 150 - 450 10e3/uL    % Neutrophils 50 %    % Lymphocytes 22 %    % Monocytes 10 %    % Eosinophils 16 %    % Basophils 1 %    % Immature Granulocytes 1 %    NRBCs per 100 WBC 0 <1 /100    Absolute Neutrophils 3.3 1.6 - 8.3 10e3/uL    Absolute Lymphocytes 1.4 0.8 - 5.3 10e3/uL    Absolute Monocytes 0.7 0.0 - 1.3 " "10e3/uL    Absolute Eosinophils 1.1 (H) 0.0 - 0.7 10e3/uL    Absolute Basophils 0.1 0.0 - 0.2 10e3/uL    Absolute Immature Granulocytes 0.0 <=0.0 10e3/uL    Absolute NRBCs 0.0 10e3/uL          Psychiatric Examination:     /77   Pulse 97   Temp 98  F (36.7  C) (Oral)   Resp 16   Ht 1.829 m (6')   Wt 89.5 kg (197 lb 4.8 oz)   SpO2 97%   BMI 26.76 kg/m    Weight is 197 lbs 4.8 oz  Body mass index is 26.76 kg/m .  Orthostatic Vitals       Most Recent      Sitting Orthostatic /90 08/25 0903    Sitting Orthostatic Pulse (bpm) 92 08/25 0903    Standing Orthostatic /87 08/25 0903    Standing Orthostatic Pulse (bpm) 109 08/25 0903            Appearance: awake, alert  Attitude:  somewhat cooperative  Eye Contact:  fair  Mood:  \"okay\"  Affect:  intensity is blunted  Speech:  rambling  Psychomotor Behavior:  no evidence of tardive dyskinesia, dystonia, or tics  Thought Process:  goal oriented  Associations:  no loose associations  Thought Content:  no evidence of suicidal ideation or homicidal ideation and obsessions present  Insight:  limited  Judgement:  limited  Oriented to:  person only  Attention Span and Concentration:  fair  Recent and Remote Memory:  poor    Clinical Global Impressions  First:  Considering your total clinical experience with this particular patient population, how severe are the patient's symptoms at this time?: 7 (07/01/21 0630)  Compared to the patient's condition at the START of treatment, this patient's condition is: 4 (07/01/21 0630)  Most recent:  Considering your total clinical experience with this particular patient population, how severe are the patient's symptoms at this time?: 6 (08/23/21 1705)  Compared to the patient's condition at the START of treatment, this patient's condition is: 3 (08/23/21 1705)         Precautions:     Behavioral Orders   Procedures     Assault precautions     Code 2     Elopement precautions     Routine Programming     As clinically " indicated     Single Room     Status 15     Every 15 minutes.          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   Stimulant use disorder, in remission.          Plan:     Assessment and hospital summary:  The patient is a 56yo male with a history of schizophrenia and TBI and multiple medical co-morbidities as above who was admitted after being transferred from [facility] after a nearly year-long stay. Is currently under commitment with Yanez recently amended to include Clozaril. While at [facility] was noted to have ongoing agitation and frequently attempting to elope from unit requiring 1:1 staffing for safety. He was started on 1:1 staff upon transfer, this was discontinued as patient has been more cooperative without elopement attempts. IM consult completed on admission and continued to follow due to elevated LFTs. Haldol and VPA discontinued due to LFTs. Cross titration completed from risperidone to clozaril after Yanez amended. EPSE/TD movements noted, have appeared stable over past few weeks. Patient has continued to have disorganization and active psychosis symptoms which appear to be at patients baseline. Team continues to work on disposition which barriers include patient has no guardian or next of kin willing to act as surrogate decision maker, see [facility] notes for complete details.      Psychiatric treatment/inteventions:  Medications:   -continue Clozaril 300mg at bedtime for psychosis and agitation. Will not adjust dose further at this time, movements consistent with TD noted, and level in therapeutic range   -continue benztropine 1mg BID for EPSE  -continue memantine 5mg daily for neurocognitive disorder  -continue risperdal 1mg Q6H PRN agitation   -continue lorazepam 0.5-1.0mg Q4H PRN anxiety or severe  agitation  -continue rivastigmine patch started 8/2 to target neurocognitive disorder       Disposition Plan   Reason for ongoing admission: is unable to care for self due to severe psychosis or mariusz  Discharge location: SNF  Discharge Medications: not ordered  Follow-up Appointments: not scheduled  Legal Status: full commitment    Entered by: Wilton Morfin on 9/3/21 at 11:57 AM

## 2021-09-03 NOTE — PLAN OF CARE
Problem: Psychotic Symptoms  Goal: Psychotic Symptoms  Description: Signs and symptoms of listed problems will be absent or manageable.  Outcome: No Change      Pt was incontinent of urine this morning.  Linen changed and pt prompted to take a shower.  Pt did take a shower and changed to clean scrubs.  Pt is oriented to self only.  Pt has been visible intermittently in the lounge area.  Pt was observed talking to himself.  Pt reports doing ok no behavioral concerns.  Good appetite eating his meals without any concerns.  Will continue to assess.

## 2021-09-03 NOTE — PLAN OF CARE
Problem: Psychotic Symptoms  Goal: Psychotic Symptoms  Description: Signs and symptoms of listed problems will be absent or manageable.  Outcome: No Change     John continues to engage in self talk and is disoriented to his surroundings, thinking he is at [facility]. He is redirectable. He spends his time in the milieu watching TV and laughing/talking to himself. He has tardive dyskinesia sx, tongue thrusting and lateral movements. This is baseline for him and does not bother him. He is behaviorally in control.    He allowed writer to collect COVID test this shift. Result was negative.

## 2021-09-03 NOTE — PLAN OF CARE
Patient has spent majority of the shift in the milieu. Keeps to himself. Ate dinner. Med compliant. Continues to appear to be responding to internal stimuli actively talking to himself. Denies suicidal ideation and self injurious thoughts. Denies depression and anxiety.     Patient evaluation continues. Assessed mood,anxiety,thoughts and behavior.     Patient gradually progressing towards goals.    Patient is encouraged to participate in groups and assisted to develop healthy coping skills.     VS reviewed: /77   Pulse 97   Temp 98  F (36.7  C) (Oral)   Resp 16   Ht 1.829 m (6')   Wt 89.5 kg (197 lb 4.8 oz)   SpO2 95%   BMI 26.76 kg/m      Length of stay: 64    Refer to daily team meeting notes for individualized plan of care. Nursing will continue to assess.

## 2021-09-03 NOTE — PLAN OF CARE
Plan of care   Problem: Sleep Disturbance  Goal: Adequate Sleep/Rest  Outcome: Improving  Received pt sleeping in bed with regular unlabored respiration; appeared to have slept for 7 hrs this shift. No PRN was requested or administered;will continue to monitor and assess.     Asif

## 2021-09-04 PROCEDURE — 250N000013 HC RX MED GY IP 250 OP 250 PS 637: Performed by: PSYCHIATRY & NEUROLOGY

## 2021-09-04 PROCEDURE — 250N000013 HC RX MED GY IP 250 OP 250 PS 637: Performed by: PHYSICIAN ASSISTANT

## 2021-09-04 PROCEDURE — 124N000002 HC R&B MH UMMC

## 2021-09-04 RX ADMIN — Medication 50 MCG: at 08:50

## 2021-09-04 RX ADMIN — SALMETEROL XINAFOATE 1 PUFF: 50 POWDER, METERED ORAL; RESPIRATORY (INHALATION) at 20:42

## 2021-09-04 RX ADMIN — BENZTROPINE MESYLATE 1 MG: 1 TABLET ORAL at 08:50

## 2021-09-04 RX ADMIN — POLYETHYLENE GLYCOL 3350 17 G: 17 POWDER, FOR SOLUTION ORAL at 08:50

## 2021-09-04 RX ADMIN — DOCUSATE SODIUM 100 MG: 100 CAPSULE, LIQUID FILLED ORAL at 08:50

## 2021-09-04 RX ADMIN — LEVOTHYROXINE SODIUM 88 MCG: 88 TABLET ORAL at 08:50

## 2021-09-04 RX ADMIN — BENZTROPINE MESYLATE 1 MG: 1 TABLET ORAL at 20:41

## 2021-09-04 RX ADMIN — METOPROLOL TARTRATE 25 MG: 25 TABLET, FILM COATED ORAL at 08:50

## 2021-09-04 RX ADMIN — RIVASTIGMINE 1 PATCH: 4.6 PATCH TRANSDERMAL at 08:50

## 2021-09-04 RX ADMIN — MEMANTINE 10 MG: 5 TABLET ORAL at 20:41

## 2021-09-04 RX ADMIN — MEMANTINE 10 MG: 5 TABLET ORAL at 08:50

## 2021-09-04 RX ADMIN — DOCUSATE SODIUM 100 MG: 100 CAPSULE, LIQUID FILLED ORAL at 20:41

## 2021-09-04 RX ADMIN — METOPROLOL TARTRATE 25 MG: 25 TABLET, FILM COATED ORAL at 20:42

## 2021-09-04 RX ADMIN — CLOZAPINE 300 MG: 100 TABLET ORAL at 20:41

## 2021-09-04 RX ADMIN — ATORVASTATIN CALCIUM 80 MG: 80 TABLET, FILM COATED ORAL at 20:41

## 2021-09-04 RX ADMIN — SALMETEROL XINAFOATE 1 PUFF: 50 POWDER, METERED ORAL; RESPIRATORY (INHALATION) at 08:58

## 2021-09-04 RX ADMIN — ASPIRIN 81 MG CHEWABLE TABLET 81 MG: 81 TABLET CHEWABLE at 08:50

## 2021-09-04 RX ADMIN — GUAIFENESIN 600 MG: 600 TABLET, EXTENDED RELEASE ORAL at 08:50

## 2021-09-04 RX ADMIN — GUAIFENESIN 600 MG: 600 TABLET, EXTENDED RELEASE ORAL at 20:41

## 2021-09-04 NOTE — PLAN OF CARE
"Problem: Cognitive Impairment (Psychotic Signs/Symptoms)  Goal: Optimal Cognitive Function (Psychotic Signs/Symptoms)  Outcome: No Change     Ojhn continues to engage in self talk. He spends his time in the TV lounge talking and laughing to himself and responding to internal stimuli. He endorses auditory hallucinations. He is tangential with garbled speech and flight of ideas.    Pt asked writer for \"Giovanny Benson\" and \"cigarettes to smoke right now.\" He is redirectable after these requests. Overall mood was calm this shift.    Continues to have NOC incontinence. Refused to put on briefs tonight. Adamantly says \"no!\" when prompted. Writer left briefs on his bed and next to his bed and requested he put these on when he gets up to go to the bathroom next. Pt replies, \"No!\"  "

## 2021-09-04 NOTE — PLAN OF CARE
Night Shift Summary (9/3/21 into 09/04/21)    Pt in bed sleeping at start of shift. Breathing quiet and unlabored. Appears to have slept 6.5 hours.    Assault and Elopement precautions in addition to Single Room order; any related events noted above.     Will continue to monitor and assess.       Problem: Behavioral Health Plan of Care  Goal: Adheres to Safety Considerations for Self and Others  Outcome: Improving     Problem: Sleep Disturbance  Goal: Adequate Sleep/Rest  Outcome: Improving

## 2021-09-04 NOTE — PLAN OF CARE
Problem: Psychotic Symptoms  Goal: Psychotic Symptoms  Description: Signs and symptoms of listed problems will be absent or manageable.  Outcome: No Change      Pt was incontinent of urine this morning.  Linen changed and pt prompted to take a shower.  Shower was completed by pt with prompts.  Pt has [place] [facility] [place] observed talking to himself.   Pt took all his medication.  Ate meals.  Behaviorally controlled.  Will continue to assess.

## 2021-09-05 PROCEDURE — 250N000013 HC RX MED GY IP 250 OP 250 PS 637: Performed by: PHYSICIAN ASSISTANT

## 2021-09-05 PROCEDURE — 124N000002 HC R&B MH UMMC

## 2021-09-05 PROCEDURE — 250N000013 HC RX MED GY IP 250 OP 250 PS 637: Performed by: PSYCHIATRY & NEUROLOGY

## 2021-09-05 RX ADMIN — DOCUSATE SODIUM 100 MG: 100 CAPSULE, LIQUID FILLED ORAL at 09:10

## 2021-09-05 RX ADMIN — ATORVASTATIN CALCIUM 80 MG: 80 TABLET, FILM COATED ORAL at 20:30

## 2021-09-05 RX ADMIN — MEMANTINE 10 MG: 5 TABLET ORAL at 09:09

## 2021-09-05 RX ADMIN — BENZTROPINE MESYLATE 1 MG: 1 TABLET ORAL at 09:10

## 2021-09-05 RX ADMIN — GUAIFENESIN 600 MG: 600 TABLET, EXTENDED RELEASE ORAL at 20:30

## 2021-09-05 RX ADMIN — SALMETEROL XINAFOATE 1 PUFF: 50 POWDER, METERED ORAL; RESPIRATORY (INHALATION) at 09:10

## 2021-09-05 RX ADMIN — Medication 50 MCG: at 09:09

## 2021-09-05 RX ADMIN — METOPROLOL TARTRATE 25 MG: 25 TABLET, FILM COATED ORAL at 09:10

## 2021-09-05 RX ADMIN — GUAIFENESIN 600 MG: 600 TABLET, EXTENDED RELEASE ORAL at 09:10

## 2021-09-05 RX ADMIN — BENZTROPINE MESYLATE 1 MG: 1 TABLET ORAL at 20:31

## 2021-09-05 RX ADMIN — LEVOTHYROXINE SODIUM 88 MCG: 88 TABLET ORAL at 09:10

## 2021-09-05 RX ADMIN — SALMETEROL XINAFOATE 1 PUFF: 50 POWDER, METERED ORAL; RESPIRATORY (INHALATION) at 20:32

## 2021-09-05 RX ADMIN — RIVASTIGMINE 1 PATCH: 4.6 PATCH TRANSDERMAL at 09:10

## 2021-09-05 RX ADMIN — MEMANTINE 10 MG: 5 TABLET ORAL at 20:30

## 2021-09-05 RX ADMIN — POLYETHYLENE GLYCOL 3350 17 G: 17 POWDER, FOR SOLUTION ORAL at 09:08

## 2021-09-05 RX ADMIN — ASPIRIN 81 MG CHEWABLE TABLET 81 MG: 81 TABLET CHEWABLE at 09:09

## 2021-09-05 RX ADMIN — CLOZAPINE 300 MG: 100 TABLET ORAL at 20:31

## 2021-09-05 RX ADMIN — DOCUSATE SODIUM 100 MG: 100 CAPSULE, LIQUID FILLED ORAL at 20:30

## 2021-09-05 RX ADMIN — METOPROLOL TARTRATE 25 MG: 25 TABLET, FILM COATED ORAL at 20:31

## 2021-09-05 ASSESSMENT — ACTIVITIES OF DAILY LIVING (ADL)
LAUNDRY: UNABLE TO COMPLETE
HYGIENE/GROOMING: PROMPTS
DRESS: SCRUBS (BEHAVIORAL HEALTH)
ORAL_HYGIENE: PROMPTS

## 2021-09-05 ASSESSMENT — MIFFLIN-ST. JEOR: SCORE: 1763.84
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
## 2021-09-05 NOTE — PLAN OF CARE
Problem: Psychotic Symptoms  Goal: Psychotic Symptoms  Description: Signs and symptoms of listed problems will be absent or manageable.  Outcome: No Change       Pt was incontinent of urine this morning, linen changed and pt was prompted to take a shower, pt did take a shower.  Pt needs prompting to complete the shower.  Pt's affect is flat blunted, mood is calm.  Pt has been visible in the milieu, continues to engage in self talk appears responding to some internal stimuli.  Pt denies suicidal ideation.  Good appetite.  Will continue to assess.

## 2021-09-06 PROCEDURE — 250N000013 HC RX MED GY IP 250 OP 250 PS 637: Performed by: PSYCHIATRY & NEUROLOGY

## 2021-09-06 PROCEDURE — 124N000002 HC R&B MH UMMC

## 2021-09-06 PROCEDURE — 250N000013 HC RX MED GY IP 250 OP 250 PS 637: Performed by: PHYSICIAN ASSISTANT

## 2021-09-06 RX ADMIN — METOPROLOL TARTRATE 25 MG: 25 TABLET, FILM COATED ORAL at 21:13

## 2021-09-06 RX ADMIN — SALMETEROL XINAFOATE 1 PUFF: 50 POWDER, METERED ORAL; RESPIRATORY (INHALATION) at 09:22

## 2021-09-06 RX ADMIN — MEMANTINE 10 MG: 5 TABLET ORAL at 09:19

## 2021-09-06 RX ADMIN — BENZTROPINE MESYLATE 1 MG: 1 TABLET ORAL at 21:13

## 2021-09-06 RX ADMIN — ASPIRIN 81 MG CHEWABLE TABLET 81 MG: 81 TABLET CHEWABLE at 09:18

## 2021-09-06 RX ADMIN — CLOZAPINE 300 MG: 100 TABLET ORAL at 21:13

## 2021-09-06 RX ADMIN — DOCUSATE SODIUM 100 MG: 100 CAPSULE, LIQUID FILLED ORAL at 09:19

## 2021-09-06 RX ADMIN — GUAIFENESIN 600 MG: 600 TABLET, EXTENDED RELEASE ORAL at 21:13

## 2021-09-06 RX ADMIN — POLYETHYLENE GLYCOL 3350 17 G: 17 POWDER, FOR SOLUTION ORAL at 09:20

## 2021-09-06 RX ADMIN — METOPROLOL TARTRATE 25 MG: 25 TABLET, FILM COATED ORAL at 09:20

## 2021-09-06 RX ADMIN — BENZTROPINE MESYLATE 1 MG: 1 TABLET ORAL at 09:19

## 2021-09-06 RX ADMIN — ATORVASTATIN CALCIUM 80 MG: 80 TABLET, FILM COATED ORAL at 21:13

## 2021-09-06 RX ADMIN — GUAIFENESIN 600 MG: 600 TABLET, EXTENDED RELEASE ORAL at 09:19

## 2021-09-06 RX ADMIN — DOCUSATE SODIUM 100 MG: 100 CAPSULE, LIQUID FILLED ORAL at 21:13

## 2021-09-06 RX ADMIN — RIVASTIGMINE 1 PATCH: 4.6 PATCH TRANSDERMAL at 09:22

## 2021-09-06 RX ADMIN — LEVOTHYROXINE SODIUM 88 MCG: 88 TABLET ORAL at 09:19

## 2021-09-06 RX ADMIN — MEMANTINE 10 MG: 5 TABLET ORAL at 21:14

## 2021-09-06 RX ADMIN — SALMETEROL XINAFOATE 1 PUFF: 50 POWDER, METERED ORAL; RESPIRATORY (INHALATION) at 21:14

## 2021-09-06 RX ADMIN — Medication 50 MCG: at 09:23

## 2021-09-06 ASSESSMENT — ACTIVITIES OF DAILY LIVING (ADL)
HYGIENE/GROOMING: PROMPTS
DRESS: SCRUBS (BEHAVIORAL HEALTH);INDEPENDENT
ORAL_HYGIENE: PROMPTS
DRESS: SCRUBS (BEHAVIORAL HEALTH)
HYGIENE/GROOMING: PROMPTS
ORAL_HYGIENE: PROMPTS
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
## 2021-09-06 NOTE — PLAN OF CARE
"  Problem: Psychotic Symptoms  Goal: Psychotic Symptoms  Description: Signs and symptoms of listed problems will be absent or manageable.  Outcome: No Change   \"I'm feeling good.\" Denies depression, \"I'm anxious to get out of here.\" Denies hallucinations but appears to be responding to internal stimuli. Talks frequently to self. Patient mumbles frequently when he is talking and it is difficult to understand him. Has been sitting out in lounge most the shift in front of TV but does not appear to be watching the TV. Declined shower, said he would take a shower tomorrow.  "

## 2021-09-07 PROCEDURE — 250N000013 HC RX MED GY IP 250 OP 250 PS 637: Performed by: PHYSICIAN ASSISTANT

## 2021-09-07 PROCEDURE — 124N000002 HC R&B MH UMMC

## 2021-09-07 PROCEDURE — 99232 SBSQ HOSP IP/OBS MODERATE 35: CPT | Performed by: PSYCHIATRY & NEUROLOGY

## 2021-09-07 PROCEDURE — 250N000013 HC RX MED GY IP 250 OP 250 PS 637: Performed by: PSYCHIATRY & NEUROLOGY

## 2021-09-07 RX ADMIN — RIVASTIGMINE 1 PATCH: 4.6 PATCH TRANSDERMAL at 08:37

## 2021-09-07 RX ADMIN — ATORVASTATIN CALCIUM 80 MG: 80 TABLET, FILM COATED ORAL at 21:11

## 2021-09-07 RX ADMIN — MEMANTINE 10 MG: 5 TABLET ORAL at 08:36

## 2021-09-07 RX ADMIN — DOCUSATE SODIUM 100 MG: 100 CAPSULE, LIQUID FILLED ORAL at 21:11

## 2021-09-07 RX ADMIN — DOCUSATE SODIUM 100 MG: 100 CAPSULE, LIQUID FILLED ORAL at 08:36

## 2021-09-07 RX ADMIN — METOPROLOL TARTRATE 25 MG: 25 TABLET, FILM COATED ORAL at 21:11

## 2021-09-07 RX ADMIN — METOPROLOL TARTRATE 25 MG: 25 TABLET, FILM COATED ORAL at 08:35

## 2021-09-07 RX ADMIN — SALMETEROL XINAFOATE 1 PUFF: 50 POWDER, METERED ORAL; RESPIRATORY (INHALATION) at 08:37

## 2021-09-07 RX ADMIN — Medication 50 MCG: at 08:36

## 2021-09-07 RX ADMIN — POLYETHYLENE GLYCOL 3350 17 G: 17 POWDER, FOR SOLUTION ORAL at 08:36

## 2021-09-07 RX ADMIN — GUAIFENESIN 600 MG: 600 TABLET, EXTENDED RELEASE ORAL at 08:35

## 2021-09-07 RX ADMIN — LEVOTHYROXINE SODIUM 88 MCG: 88 TABLET ORAL at 08:36

## 2021-09-07 RX ADMIN — CLOZAPINE 300 MG: 100 TABLET ORAL at 21:11

## 2021-09-07 RX ADMIN — ASPIRIN 81 MG CHEWABLE TABLET 81 MG: 81 TABLET CHEWABLE at 08:35

## 2021-09-07 RX ADMIN — GUAIFENESIN 600 MG: 600 TABLET, EXTENDED RELEASE ORAL at 21:10

## 2021-09-07 RX ADMIN — BENZTROPINE MESYLATE 1 MG: 1 TABLET ORAL at 21:10

## 2021-09-07 RX ADMIN — MEMANTINE 10 MG: 5 TABLET ORAL at 21:11

## 2021-09-07 RX ADMIN — BENZTROPINE MESYLATE 1 MG: 1 TABLET ORAL at 08:36

## 2021-09-07 RX ADMIN — SALMETEROL XINAFOATE 1 PUFF: 50 POWDER, METERED ORAL; RESPIRATORY (INHALATION) at 21:10

## 2021-09-07 ASSESSMENT — ACTIVITIES OF DAILY LIVING (ADL)
HYGIENE/GROOMING: PROMPTS
DRESS: SCRUBS (BEHAVIORAL HEALTH)
ORAL_HYGIENE: PROMPTS
DRESS: INDEPENDENT;PROMPTS
ORAL_HYGIENE: INDEPENDENT;PROMPTS
HYGIENE/GROOMING: INDEPENDENT;PROMPTS

## 2021-09-07 ASSESSMENT — MIFFLIN-ST. JEOR: SCORE: 1762.94

## 2021-09-07 NOTE — PROGRESS NOTES
"[facility], [place]   Psychiatric Progress Note        Interim History:   The patient's care was discussed with the treatment team during the daily team meeting and/or staff's chart notes were reviewed.  Staff report patient has been about the same. Has been engaged in \"self talk.\"     The patient reports that he is \"good.\" Says that he had a good weekend. Denies problems with sleep or appetite. Denies SI or HI. Says that his AH have been \"all right.\"          Medications:       aspirin  81 mg Oral Daily     atorvastatin  80 mg Oral At Bedtime     benztropine  1 mg Oral BID     cholecalciferol  1,250 mcg Oral Q7 Days     cloZAPine  300 mg Oral At Bedtime     docusate sodium  100 mg Oral BID     guaiFENesin  600 mg Oral BID     levothyroxine  88 mcg Oral Daily     memantine  10 mg Oral BID     metoprolol tartrate  25 mg Oral BID     polyethylene glycol  17 g Oral Daily     rivastigmine  1 patch Transdermal Daily     rivastigmine   Transdermal Q8H     salmeterol  1 puff Inhalation BID     vitamin D3  50 mcg Oral Daily          Allergies:     Allergies   Allergen Reactions     Ibuprofen      Latex           Labs:     No results found for this or any previous visit (from the past 24 hour(s)).       Psychiatric Examination:     BP (!) 124/90   Pulse 94   Temp 98  F (36.7  C) (Oral)   Resp 18   Ht 1.829 m (6')   Wt 90 kg (198 lb 6.4 oz)   SpO2 98%   BMI 26.91 kg/m    Weight is 198 lbs 6.4 oz  Body mass index is 26.91 kg/m .  Orthostatic Vitals       Most Recent      Sitting Orthostatic /90 08/25 0903    Sitting Orthostatic Pulse (bpm) 92 08/25 0903    Standing Orthostatic /87 08/25 0903    Standing Orthostatic Pulse (bpm) 109 08/25 0903            Appearance: awake, alert  Attitude:  more cooperative  Eye Contact:  fair  Mood:  good  Affect:  intensity is blunted, full at times  Speech:  mumbling  Psychomotor Behavior:  no evidence of tardive dyskinesia, dystonia, or " tics  Thought Process:  goal oriented  Associations:  no loose associations  Thought Content:  no evidence of suicidal ideation or homicidal ideation and obsessions present  Insight:  limited  Judgement:  limited  Oriented to:  person only  Attention Span and Concentration:  fair  Recent and Remote Memory:  poor    Clinical Global Impressions  First:  Considering your total clinical experience with this particular patient population, how severe are the patient's symptoms at this time?: 7 (07/01/21 0630)  Compared to the patient's condition at the START of treatment, this patient's condition is: 4 (07/01/21 0630)  Most recent:  Considering your total clinical experience with this particular patient population, how severe are the patient's symptoms at this time?: 6 (08/23/21 1705)  Compared to the patient's condition at the START of treatment, this patient's condition is: 3 (08/23/21 1705)         Precautions:     Behavioral Orders   Procedures     Assault precautions     Code 2     Elopement precautions     Routine Programming     As clinically indicated     Single Room     Status 15     Every 15 minutes.          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   Stimulant use disorder, in remission.          Plan:     Assessment and hospital summary:  The patient is a 54yo male with a history of schizophrenia and TBI and multiple medical co-morbidities as above who was admitted after being transferred from [facility] medical [facility] after a nearly year-long stay. Is currently under commitment with [facility] recently amended to include Clozaril. While at [facility] was noted to have ongoing agitation and frequently attempting to elope from unit requiring 1:1 staffing for safety. He was started on 1:1 staff upon transfer, this was discontinued as  patient has been more cooperative without elopement attempts. IM consult completed on admission and continued to follow due to elevated LFTs. Haldol and VPA discontinued due to LFTs. Cross titration completed from risperidone to clozaril after Yanez amended. EPSE/TD movements noted, have appeared stable over past few weeks. Patient has continued to have disorganization and active psychosis symptoms which appear to be at patients baseline. Team continues to work on disposition which barriers include patient has no guardian or next of kin willing to act as surrogate decision maker, see CTC notes for complete details.      Psychiatric treatment/inteventions:  Medications:   -continue Clozaril 300mg at bedtime for psychosis and agitation. Will not adjust dose further at this time, movements consistent with TD noted, and level in therapeutic range   -continue benztropine 1mg BID for EPSE  -continue memantine 5mg daily for neurocognitive disorder  -continue risperdal 1mg Q6H PRN agitation   -continue lorazepam 0.5-1.0mg Q4H PRN anxiety or severe agitation  -continue rivastigmine patch started 8/2 to target neurocognitive disorder       Disposition Plan   Reason for ongoing admission: is unable to care for self due to severe psychosis or mariusz  Discharge location: SNF  Discharge Medications: not ordered  Follow-up Appointments: not scheduled  Legal Status: full commitment    Entered by: Wilton Morfin on 9/7/21 at 12:44 PM

## 2021-09-07 NOTE — PLAN OF CARE
Assessment/Intervention/Current Symptoms and Care Coordination  WR attended team and reviewed chart.   During team discussed disposition. Pt is far from stabilized at this point.     Discharge Plan or Goal  Pending stabilization & development of a safe discharge plan.  Considerations include: locked nursing home    Barriers to Discharge   Patient requires further psychiatric stabilization due to current symptomology    Referral Status  Working on guardian, will need to locate nursing home     Legal Status  MI commitment in [place]

## 2021-09-07 NOTE — PLAN OF CARE
"Problem: Behavioral Disturbance  Goal: Behavioral Disturbance  Description: Signs and symptoms of listed problems will be absent or manageable by discharge or transition of care.  Outcome: No Change     John is overall pleasant with staff. He did have one verbal outburst in the TV lounge early in the shift, where he was yelling at nobody in particular. When writer followed up, pt states, \"get these guys out of my head. They're in my brain trying to say stuff to me.\" Offered PRN for agitation/auditory hallucinations, but pt refused. He was behaviorally in control the rest of the shift.    He denies SI/HI. He responds to internal stimuli and engages in self talk. He has oral tardive dyskinesia sx which have remained consistent and are not bothersome to him. He has \"voices\" that he hears. He was med compliant and had no concerns.    Pt continues to have NOC incontinence. Pt continues to refuse to wear briefs.  "

## 2021-09-07 NOTE — PLAN OF CARE
Pt has a flat blunted affect with a irritable mood this morning. Pt was less irritable as shift went on. Pt declined to attend any groups. Pt was noted in the milieu but withdrawn to himself. Pt not social with peers. Speech was tangential. Pt rates anxiety at 0/10 and depression 0/10. Pt rates pain at 0/10. Pt reports no SI/HI and contracts for safety. Pt denies any hallucinations but noted responding to internal stimuli and self talk. Pt was medication compliant. No reported medication side effects, but some tardive noted. Continue current POC.
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
## 2021-09-08 PROCEDURE — 250N000013 HC RX MED GY IP 250 OP 250 PS 637: Performed by: PHYSICIAN ASSISTANT

## 2021-09-08 PROCEDURE — 250N000013 HC RX MED GY IP 250 OP 250 PS 637: Performed by: PSYCHIATRY & NEUROLOGY

## 2021-09-08 PROCEDURE — 124N000002 HC R&B MH UMMC

## 2021-09-08 PROCEDURE — 99232 SBSQ HOSP IP/OBS MODERATE 35: CPT | Performed by: PSYCHIATRY & NEUROLOGY

## 2021-09-08 RX ADMIN — METOPROLOL TARTRATE 25 MG: 25 TABLET, FILM COATED ORAL at 21:15

## 2021-09-08 RX ADMIN — ATORVASTATIN CALCIUM 80 MG: 80 TABLET, FILM COATED ORAL at 21:15

## 2021-09-08 RX ADMIN — DOCUSATE SODIUM 100 MG: 100 CAPSULE, LIQUID FILLED ORAL at 21:15

## 2021-09-08 RX ADMIN — CLOZAPINE 300 MG: 100 TABLET ORAL at 21:15

## 2021-09-08 RX ADMIN — Medication 50 MCG: at 08:23

## 2021-09-08 RX ADMIN — ASPIRIN 81 MG CHEWABLE TABLET 81 MG: 81 TABLET CHEWABLE at 08:22

## 2021-09-08 RX ADMIN — GUAIFENESIN 600 MG: 600 TABLET, EXTENDED RELEASE ORAL at 21:15

## 2021-09-08 RX ADMIN — MEMANTINE 10 MG: 5 TABLET ORAL at 08:23

## 2021-09-08 RX ADMIN — GUAIFENESIN 600 MG: 600 TABLET, EXTENDED RELEASE ORAL at 08:23

## 2021-09-08 RX ADMIN — LEVOTHYROXINE SODIUM 88 MCG: 88 TABLET ORAL at 08:23

## 2021-09-08 RX ADMIN — METOPROLOL TARTRATE 25 MG: 25 TABLET, FILM COATED ORAL at 08:23

## 2021-09-08 RX ADMIN — BENZTROPINE MESYLATE 1 MG: 1 TABLET ORAL at 08:23

## 2021-09-08 RX ADMIN — SALMETEROL XINAFOATE 1 PUFF: 50 POWDER, METERED ORAL; RESPIRATORY (INHALATION) at 21:16

## 2021-09-08 RX ADMIN — MEMANTINE 10 MG: 5 TABLET ORAL at 21:15

## 2021-09-08 RX ADMIN — BENZTROPINE MESYLATE 1 MG: 1 TABLET ORAL at 21:15

## 2021-09-08 RX ADMIN — SALMETEROL XINAFOATE 1 PUFF: 50 POWDER, METERED ORAL; RESPIRATORY (INHALATION) at 08:24

## 2021-09-08 RX ADMIN — RIVASTIGMINE 1 PATCH: 4.6 PATCH TRANSDERMAL at 08:36

## 2021-09-08 RX ADMIN — POLYETHYLENE GLYCOL 3350 17 G: 17 POWDER, FOR SOLUTION ORAL at 08:22

## 2021-09-08 RX ADMIN — DOCUSATE SODIUM 100 MG: 100 CAPSULE, LIQUID FILLED ORAL at 08:23

## 2021-09-08 ASSESSMENT — ACTIVITIES OF DAILY LIVING (ADL)
LAUNDRY: WITH SUPERVISION
LAUNDRY: WITH SUPERVISION
DRESS: INDEPENDENT
ORAL_HYGIENE: INDEPENDENT
DRESS: SCRUBS (BEHAVIORAL HEALTH)
ORAL_HYGIENE: PROMPTS
HYGIENE/GROOMING: PROMPTS
HYGIENE/GROOMING: INDEPENDENT

## 2021-09-08 NOTE — PLAN OF CARE
Patient was up early was not incontinent last night.  He did allow staff to take vitals, was medication compliant .  His appetite is good states sleeping ok.  Has little to say to staff, needs encouragement with ADL'S . Needs total assist with room care, changing bed linens .  He will just throw his wet clothe or linens on the floor and sleep on the plastic mattress. .

## 2021-09-08 NOTE — PROGRESS NOTES
"[facility], [place]   Psychiatric Progress Note        Interim History:   The patient's care was discussed with the treatment team during the daily team meeting and/or staff's chart notes were reviewed.  Staff report patient has been engaged in self talk and laughing to himself at times.     The patient reports that he is \"all right.\" Denies problems with appetite. Says that he didn't sleep well last night and feels tired today. Denies SI or HI. Denies any problems with pain.          Medications:       aspirin  81 mg Oral Daily     atorvastatin  80 mg Oral At Bedtime     benztropine  1 mg Oral BID     cholecalciferol  1,250 mcg Oral Q7 Days     cloZAPine  300 mg Oral At Bedtime     docusate sodium  100 mg Oral BID     guaiFENesin  600 mg Oral BID     levothyroxine  88 mcg Oral Daily     memantine  10 mg Oral BID     metoprolol tartrate  25 mg Oral BID     polyethylene glycol  17 g Oral Daily     rivastigmine  1 patch Transdermal Daily     rivastigmine   Transdermal Q8H     salmeterol  1 puff Inhalation BID     vitamin D3  50 mcg Oral Daily          Allergies:     Allergies   Allergen Reactions     Ibuprofen      Latex           Labs:     No results found for this or any previous visit (from the past 24 hour(s)).       Psychiatric Examination:     BP (!) 131/92 (BP Location: Right arm)   Pulse 77   Temp 97.5  F (36.4  C) (Oral)   Resp 16   Ht 1.829 m (6')   Wt 90 kg (198 lb 6.4 oz)   SpO2 98%   BMI 26.91 kg/m    Weight is 198 lbs 6.4 oz  Body mass index is 26.91 kg/m .  Orthostatic Vitals       Most Recent      Sitting Orthostatic /90 08/25 0903    Sitting Orthostatic Pulse (bpm) 92 08/25 0903    Standing Orthostatic /87 08/25 0903    Standing Orthostatic Pulse (bpm) 109 08/25 0903            Appearance: awake, alert  Attitude:  somewhat cooperative  Eye Contact:  fair  Mood:  good  Affect:  intensity is blunted  Speech:  mumbling  Psychomotor Behavior:  no evidence of " tardive dyskinesia, dystonia, or tics  Thought Process:  goal oriented  Associations:  no loose associations  Thought Content:  no evidence of suicidal ideation or homicidal ideation and obsessions present  Insight:  limited  Judgement:  limited  Oriented to:  person only  Attention Span and Concentration:  fair  Recent and Remote Memory:  poor    Clinical Global Impressions  First:  Considering your total clinical experience with this particular patient population, how severe are the patient's symptoms at this time?: 7 (07/01/21 0630)  Compared to the patient's condition at the START of treatment, this patient's condition is: 4 (07/01/21 0630)  Most recent:  Considering your total clinical experience with this particular patient population, how severe are the patient's symptoms at this time?: 6 (08/23/21 1705)  Compared to the patient's condition at the START of treatment, this patient's condition is: 3 (08/23/21 1705)         Precautions:     Behavioral Orders   Procedures     Assault precautions     Code 2     Elopement precautions     Routine Programming     As clinically indicated     Single Room     Status 15     Every 15 minutes.          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   Stimulant use disorder, in remission.          Plan:     Assessment and hospital summary:  The patient is a 56yo male with a history of schizophrenia and TBI and multiple medical co-morbidities as above who was admitted after being transferred from [facility] medical [facility] after a nearly year-long stay. Is currently under commitment with [facility] recently amended to include Clozaril. While at [facility] was noted to have ongoing agitation and frequently attempting to elope from unit requiring 1:1 staffing for safety. He was started on 1:1 staff upon  transfer, this was discontinued as patient has been more cooperative without elopement attempts. IM consult completed on admission and continued to follow due to elevated LFTs. Haldol and VPA discontinued due to LFTs. Cross titration completed from risperidone to clozaril after Yanez amended. EPSE/TD movements noted, have appeared stable over past few weeks. Patient has continued to have disorganization and active psychosis symptoms which appear to be at patients baseline. Team continues to work on disposition which barriers include patient has no guardian or next of kin willing to act as surrogate decision maker, see CTC notes for complete details.      Psychiatric treatment/inteventions:  Medications:   -continue Clozaril 300mg at bedtime for psychosis and agitation. Will not adjust dose further at this time, movements consistent with TD noted, and level in therapeutic range   -continue benztropine 1mg BID for EPSE  -continue memantine 5mg daily for neurocognitive disorder  -continue risperdal 1mg Q6H PRN agitation   -continue lorazepam 0.5-1.0mg Q4H PRN anxiety or severe agitation  -continue rivastigmine patch started 8/2 to target neurocognitive disorder       Disposition Plan   Reason for ongoing admission: is unable to care for self due to severe psychosis or mariusz  Discharge location: SNF  Discharge Medications: not ordered  Follow-up Appointments: not scheduled  Legal Status: full commitment    Entered by: Wilton Morfin on 9/8/21 at 12:22 PM
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
## 2021-09-08 NOTE — PLAN OF CARE
Assessment/Intervention/Current Symtoms and Care Coordination  -Chart review  -Pre round meeting with team  -Rounded with team, addressed patient needs/concerns  -Post round meeting with team  Current Symptoms include the following: Psychosis    Discharge Plan or Goal  Pending stabilization & development of a safe discharge plan.  Considerations include:  nursing home    Barriers to Discharge  No guardians available    Referral Status  Can not refer to nursing homes since there is not a guardian    Legal Status  Patient is under MI commitment in [facility]

## 2021-09-09 PROCEDURE — 250N000013 HC RX MED GY IP 250 OP 250 PS 637: Performed by: PSYCHIATRY & NEUROLOGY

## 2021-09-09 PROCEDURE — 124N000002 HC R&B MH UMMC

## 2021-09-09 PROCEDURE — 99232 SBSQ HOSP IP/OBS MODERATE 35: CPT | Performed by: PSYCHIATRY & NEUROLOGY

## 2021-09-09 PROCEDURE — 250N000013 HC RX MED GY IP 250 OP 250 PS 637: Performed by: PHYSICIAN ASSISTANT

## 2021-09-09 RX ADMIN — GUAIFENESIN 600 MG: 600 TABLET, EXTENDED RELEASE ORAL at 21:34

## 2021-09-09 RX ADMIN — SALMETEROL XINAFOATE 1 PUFF: 50 POWDER, METERED ORAL; RESPIRATORY (INHALATION) at 08:37

## 2021-09-09 RX ADMIN — Medication 50 MCG: at 08:36

## 2021-09-09 RX ADMIN — NICOTINE POLACRILEX 2 MG: 2 LOZENGE ORAL at 21:39

## 2021-09-09 RX ADMIN — POLYETHYLENE GLYCOL 3350 17 G: 17 POWDER, FOR SOLUTION ORAL at 08:36

## 2021-09-09 RX ADMIN — METOPROLOL TARTRATE 25 MG: 25 TABLET, FILM COATED ORAL at 21:34

## 2021-09-09 RX ADMIN — RIVASTIGMINE 1 PATCH: 4.6 PATCH TRANSDERMAL at 08:36

## 2021-09-09 RX ADMIN — MEMANTINE 10 MG: 5 TABLET ORAL at 08:37

## 2021-09-09 RX ADMIN — ATORVASTATIN CALCIUM 80 MG: 80 TABLET, FILM COATED ORAL at 21:34

## 2021-09-09 RX ADMIN — DOCUSATE SODIUM 100 MG: 100 CAPSULE, LIQUID FILLED ORAL at 21:34

## 2021-09-09 RX ADMIN — BENZTROPINE MESYLATE 1 MG: 1 TABLET ORAL at 21:34

## 2021-09-09 RX ADMIN — CHOLECALCIFEROL CAP 1.25 MG (50000 UNIT) 1250 MCG: 1.25 CAP at 08:41

## 2021-09-09 RX ADMIN — DOCUSATE SODIUM 100 MG: 100 CAPSULE, LIQUID FILLED ORAL at 08:37

## 2021-09-09 RX ADMIN — SALMETEROL XINAFOATE 1 PUFF: 50 POWDER, METERED ORAL; RESPIRATORY (INHALATION) at 21:36

## 2021-09-09 RX ADMIN — METOPROLOL TARTRATE 25 MG: 25 TABLET, FILM COATED ORAL at 08:37

## 2021-09-09 RX ADMIN — GUAIFENESIN 600 MG: 600 TABLET, EXTENDED RELEASE ORAL at 08:36

## 2021-09-09 RX ADMIN — CLOZAPINE 300 MG: 100 TABLET ORAL at 21:34

## 2021-09-09 RX ADMIN — LEVOTHYROXINE SODIUM 88 MCG: 88 TABLET ORAL at 08:37

## 2021-09-09 RX ADMIN — BENZTROPINE MESYLATE 1 MG: 1 TABLET ORAL at 08:37

## 2021-09-09 RX ADMIN — ASPIRIN 81 MG CHEWABLE TABLET 81 MG: 81 TABLET CHEWABLE at 08:36

## 2021-09-09 RX ADMIN — MEMANTINE 10 MG: 5 TABLET ORAL at 21:34

## 2021-09-09 ASSESSMENT — ACTIVITIES OF DAILY LIVING (ADL)
DRESS: INDEPENDENT
ORAL_HYGIENE: INDEPENDENT
LAUNDRY: WITH SUPERVISION
LAUNDRY: WITH SUPERVISION
HYGIENE/GROOMING: INDEPENDENT
HYGIENE/GROOMING: INDEPENDENT
ORAL_HYGIENE: INDEPENDENT
ORAL_HYGIENE: INDEPENDENT
LAUNDRY: WITH SUPERVISION
HYGIENE/GROOMING: INDEPENDENT

## 2021-09-09 NOTE — PROGRESS NOTES
"[facility], [place]   Psychiatric Progress Note  Hospital Day: 71        Interim History:   The patient's care was discussed with the treatment team during the daily team meeting and/or staff's chart notes were reviewed.  Staff report patient has been visible in the milieu, cooperative with vitals, keeping to self, denying SI or AVH, will be noted to be mumbling and talking to self at times, taking medications as prescribed, no acute events overnight.     Upon interview, patient did not remember writer from previous visits. Mood is \"fine\", denies SI or HI, denies AVH. Oriented to self. Denies side effects from medications other than being tired. Is requesting to discharge, reviewed commitment and team working on disposition plans, no additional concerns.          Medications:       aspirin  81 mg Oral Daily     atorvastatin  80 mg Oral At Bedtime     benztropine  1 mg Oral BID     cholecalciferol  1,250 mcg Oral Q7 Days     cloZAPine  300 mg Oral At Bedtime     docusate sodium  100 mg Oral BID     guaiFENesin  600 mg Oral BID     levothyroxine  88 mcg Oral Daily     memantine  10 mg Oral BID     metoprolol tartrate  25 mg Oral BID     polyethylene glycol  17 g Oral Daily     rivastigmine  1 patch Transdermal Daily     rivastigmine   Transdermal Q8H     salmeterol  1 puff Inhalation BID     vitamin D3  50 mcg Oral Daily          Allergies:     Allergies   Allergen Reactions     Ibuprofen      Latex           Labs:     No results found for this or any previous visit (from the past 48 hour(s)).       Psychiatric Examination:     /84   Pulse 87   Temp 98  F (36.7  C) (Oral)   Resp 16   Ht 1.829 m (6')   Wt 90 kg (198 lb 6.4 oz)   SpO2 98%   BMI 26.91 kg/m    Weight is 198 lbs 6.4 oz  Body mass index is 26.91 kg/m .    Orthostatic Vitals       Most Recent      Sitting Orthostatic /80 09/09 0825    Sitting Orthostatic Pulse (bpm) 89 09/09 0825    Standing Orthostatic BP " "111/78 09/09 0825    Standing Orthostatic Pulse (bpm) 108 09/09 0825        Appearance: awake, alert and untidy  Attitude: somewhat cooperative  Eye Contact:  fair   Mood:  \"fine\", irritable  Affect:  mood congruent and intensity is flat  Speech:  soft volume, raspy ,dysarthric   Language: fluent and intact in English  Psychomotor, Gait, Musculoskeletal:  no evidence of dystonia, or tics, continues to have movements of buccolingual dyskinesia present during interview  Thought Process: disorganized  Associations:  no loose associations  Thought Content:  no evidence of suicidal ideation or homicidal ideation and continues to be delusional and observed responding to internal stimuli at times  Insight:  limited  Judgement:  limited  Oriented to:  person  Attention Span and Concentration:  limited  Recent and Remote Memory:  limited  Fund of Knowledge:  delayed    Clinical Global Impressions  First:  Considering your total clinical experience with this particular patient population, how severe are the patient's symptoms at this time?: 7 (07/01/21 0630)  Compared to the patient's condition at the START of treatment, this patient's condition is: 4 (07/01/21 0630)  Most recent:  Considering your total clinical experience with this particular patient population, how severe are the patient's symptoms at this time?: 6 (09/08/21 0551)  Compared to the patient's condition at the START of treatment, this patient's condition is: 3 (09/08/21 0551)           Precautions:     Behavioral Orders   Procedures     Assault precautions     Code 2     Elopement precautions     Routine Programming     As clinically indicated     Single Room     Status 15     Every 15 minutes.          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   Stimulant use disorder, in " remission.   Transaminitis, possibly medication induced   Hx of CVA  Vit D deficiency  Hypertension  COPD  Hx of infective endocarditis with severe mitral and aortic regurgitation s/p biprostehtic valve replacement 2015  Hx of HFrEF now recovered  Tobacco use disorder  Non-severe malnutrition   Urinary incontinence         Assessment & Plan:   Assessment and hospital summary:  The patient is a 56yo male with a history of schizophrenia and TBI and multiple medical co-morbidities as above who was admitted after being transferred from [facility] after a nearly year-long stay. Is currently under commitment with Yanez recently amended to include Clozaril. While at [facility] was noted to have ongoing agitation and frequently attempting to elope from unit requiring 1:1 staffing for safety. He was started on 1:1 staff upon transfer, this was discontinued as patient has been more cooperative without elopement attempts. IM consult completed on admission and continued to follow due to elevated LFTs. Haldol and VPA discontinued due to LFTs. Cross titration completed from risperidone to clozaril after Yanez amended. EPSE/TD movements noted, have appeared stable over past few weeks. Patient has continued to have disorganization and active psychosis symptoms which appear to be at patients baseline. Team continues to work on disposition which barriers include patient has no guardian or next of kin willing to act as surrogate decision maker, see CTC notes for complete details.     Psychiatric treatment/inteventions:  Medications:   -continue Clozaril 300mg at bedtime for psychosis and agitation. Will not adjust dose further at this time, movements consistent with TD noted, and level in therapeutic range   -continue benztropine 1mg BID for EPSE  -continue memantine 5mg daily for neurocognitive disorder  -continue risperdal 1mg Q6H PRN agitation   -continue lorazepam 0.5-1.0mg Q4H PRN anxiety or severe  agitation  -continue rivastigmine patch started 8/2 to target neurocognitive disorder     Laboratory/Imaging: weekly WBC and ANC for clozapine monitoring continues, last ANC 9/3 was 3.3 next ANC 9/10; weekly covid negative 9/3     Patient will be treated in therapeutic milieu with appropriate individual and group therapies as described.     Medical treatment/interventions:  Medical concerns:   1) IM consult placed on admission, per consult note on 7/1/21:   Diagnoses:    #Major neurocognitive disorder dt hx of TBI and alcohol use disorder  #Schizophrenia  #Under commitment  Had been residing in group home prior to admission.  Brought to hospital for evaluation of aggressive behaviors. Group home now unwilling to take him back. Has had numerous CODE 21s called this stay. Seen by psych, meds adjusted. Is currently under civil commitment and court hold through [facility] until 7/31/21. Please see recent discharge summary for details on 6/30/20201.   -Management per psychiatry      #Vitamin D Deficiency- Vit D level 16. Has been in hospital since Sept 2020. Started on cholecalciferol 56043 units on 6/17. Continue high-dose weekly replacement for 6 weeks total. Following high dose replacement recommend starting vitamin D 2000 international unit(s) daily replacement (start date 8/5/2021).      #Possible Tardive Dyskinesia vs EPSE- Per psych assessment on 4/1/21, noted to have possible tardive dyskinesia vs extrapyramidal side effects of meds.  - At that time, recommended to increase Cogentin to 1mg BID and add vitamin E 800 international unit(s) daily.      #Hx of CVA - hx of basal ganglia stroke in 2015. No focal neuro deficits aside from cognitive dysfunction as above.      #Hyperlipidemia - Continue PTA ASA 81 mg daily and atorvastatin 80 mg daily.      #COPD - Not O2 dependent. Continue PTA on salmeterol and albuterol PRN. Patient occasionally refusing inhalers, encourage compliance. Mucinex added for  intermittent cough at [facility].      #Hypothyroidism- TSH 3.18 on 9/2020. Repeat recently on 5/29/2021 slightly elevated 5.18, with no T4 . Continue PTA levothyroxine 88 mcg daily. Repeat TSH with reflex to T4.      #Hx of infective endocarditis with severe mitral and aortic regurgitation S/P bioprosthetic valve replacement (10/2015)  #Hx of HFrEF, now recovered, not in acute exacerbation  Follows with Dr. Dockery of [facility], last seen in 1/2020. Echocardiogram in 5/2016 with EF 50%, no evidence for prosthetic mitral and aortic valve dysfunction.   - Follow-up with outpatient Cardiology upon dismissal from the hospital (on every 2 year follow-ups).  - Continue ASA 81 mg daily      #Tobacco use disorder- Using nicotine patch and PRN gum.     #Non-severe malnutrition- Nutrition consulted and following at OSH.      Ordered CMP, CBC, and TSH with reflex.  No further recommendations at this time. Please page the on-call SHREE for any intercurrent medical issues which arise.      ADDENDUM: TSH normal. CBC normal. ALT elevated at 174, with unsatisfactory AST. Per chart review, LFTs last checked in December with  and . T bili and alk phos.  Reviewed with pharmacy, and possible medications causing would be Haldol, depakote, and statin. Discussed with psych who will taper haldol and Depakote. Holding statin, will check LDL.  Will repeat LFTs in AM, and check CK. Medicine will follow up on repeat LFTs, and CK.      Edith Alfaro PA-C  Hospitalist Service     2) Per updated progress note by IM 7/4:   A/P:  #Transaminitis - slowly rising LFTs since December. Asymptomatic. Reviewed with pharmacy, and possible medications causing would be Haldol, depakote, and statin. Discussed with psych who will taper haldol and Depakote. Holding statin, (total cholesterol 91, with LDL 41). Repeat LFTs continue to rise slightly.   -Abdominal US tomorrow (NPO after midnight)  -Hepatitis B surface ab and agn  and Hep C ab   -Repeat LFTs in 3 days     Medicine will follow up on Abdominal US, viral hepatitis studies and repeat LFTs.  No further recommendations at this time. Please page the on-call SHREE for any intercurrent medical issues which arise.      Edith Alfaro PA-C  Hospitalist Service     Per progress note 7/12:      Brief Medicine Note     Medicine following peripherally for LFT monitoring.  LFTs today improved:  (182) and ALT 92 (129). T bili and ALP WNL.      Today's vital signs, medications, and nursing notes were reviewed.       /77 (BP Location: Left arm)   Pulse 110   Temp 97.8  F (36.6  C) (Oral)   Resp 16   Ht 1.829 m (6')   Wt 87.5 kg (192 lb 14.4 oz)   SpO2 95%   BMI 26.16 kg/m       Continue current plan of care. Will continue to trend CMP In 3 days to ensure continued downtrend.      Kim Harman PA-C  Hospitalist Service        3) Urinary incontinence and stool incontinence: RN staff first reported urinary incontinence over weekend 7/9, per chart review patient has history of intermittent urinary incontinence going back to at least 2016, continues to have intermittent episodes of incontinence, continues to have both urinary and stool incontinence, placed IM consult for further assessment of need for additional work up given pts limitations as historian, appreciate assistance, no abnormalities noted on evaluation including bladder scan. Patient declining to wear incontinence briefs most days, staff continuing to encourage.    4) Unvaccinated for Covid-19, see interim history of note 8/19 by writer, attempted to discuss R/B/A of vaccination with patient, he did not demonstrate capacity, has no next of kin acting as surrogate decision maker, is awaiting guardianship, given patients age and medical co-morbidities the risks of receiving Covid vaccine are outweighed by the benefits, including patient is required to be vaccinated to be considered for discharge to some nursing  home facilities which would be a more appropriate milieu and less restrictive environment for patient than locked inpatient psychiatric unit. Dr. Nickerson provided second opinion regarding administration of Covid vaccine on 8.20 and agrees with recommendation.   -Covid vaccine order placed 8/23, J&J vaccine given 8/24     This note was created by undersigned using a Dragon dictation system. All typing errors or contextual distortion are unintentional and software inherent.     Disposition Plan   Reason for ongoing admission: is unable to care for self due to severe psychosis or mariusz and neurocognitive disorder  Discharge location: TBD, likely locked NH facility , unable to progress on discharge planning due to guardianship not being in place, see CTC notes  Discharge Medications: not ordered  Follow-up Appointments: not scheduled  Legal Status: Full MI commitment and Yanez     Entered by: Jeanette Dobbins on 9/9/2021 at 7:35 AM

## 2021-09-09 NOTE — PLAN OF CARE
BEHAVIORAL TEAM DISCUSSION    Participants:   Dr. Dobbins, Marylu WEN, Lynn Murry RN, Coral Mkceon OT      Progress:   Pt is on a therapeutic dose of Clozaril. He does have tardive dyskinesia.  Pt is compliant with medications.  He continues to respond to internal stimuli.    Anticipated length of stay:   2 months      Continued Stay Criteria/Rationale:   The pt is unable to care for himself.    Medical/Physical:   Pt continues with incontinence and declines depends.    Precautions:   Behavioral Orders   Procedures     Assault precautions     Code 2     Elopement precautions     Routine Programming     As clinically indicated     Single Room     Status 15     Every 15 minutes.     Plan:   Wait for [facility] to obtain a guardian for the pt so we can refer to a nursing home    Rationale for change in precautions or plan: no changes

## 2021-09-09 NOTE — PLAN OF CARE
Problem: Sleep Disturbance  Goal: Adequate Sleep/Rest  Outcome: No Change         NURSING ASSESSMENT: Patient is assessed for suicidal risk and mental health symptoms.  Patient is observed in the milieu watching TV.  Patient ate 100% of breakfast and lunch.  He is compliant with medications.  Patient affect is flat and blunted.  Mood is calm.  Rambles for speech.  Patient is cooperative.  Denies any questions or concerns.       MENTAL HEALTH:   Pt denies SI, SIB, HI, and hallucinations.  Patient denies, but is frequently observed responding to internal stimuli.      Appetite= Appropriate     Sleep= appropriate    MSSA = NA  COWS= NA     EDUCATION:        Will continue with plan of care.      PRNS this shift None

## 2021-09-09 NOTE — PLAN OF CARE
Patient spent majority of the shift in the milieu. Keeps to himself while watching television programming. Patient denies suicidal ideation. Denies suicidal ideation and self injurious thoughts. Denies auditory and visual hallucinations. Ate dinner. Med compliant. Continues to talk under his breath and mumbles when he talks. Cooperative on unit.     Patient evaluation continues. Assessed mood,anxiety,thoughts and behavior.     Patient gradually progressing towards goals.    Patient is encouraged to participate in groups and assisted to develop healthy coping skills.     VS reviewed: /84   Pulse 87   Temp 98  F (36.7  C) (Oral)   Resp 16   Ht 1.829 m (6')   Wt 90 kg (198 lb 6.4 oz)   SpO2 98%   BMI 26.91 kg/m      Length of stay: 70    Refer to daily team meeting notes for individualized plan of care. Nursing will continue to assess.

## 2021-09-09 NOTE — PLAN OF CARE
Assessment/Intervention/Current Symtoms and Care Coordination  -Chart review  -Pre round meeting with team  -Rounded with team, addressed patient needs/concerns  -Post round meeting with team  Current Symptoms include the following: Psychosis    Discharge Plan or Goal  Pending stabilization & development of a safe discharge plan.  Considerations include: nursing home    Barriers to Discharge  Patient requires further psychiatric stabilization due to current symptomology    Referral Status  needs guardian before referal for nursing home    Legal Status  Patient is under MI commitment in [facility]

## 2021-09-10 LAB
BASOPHILS # BLD AUTO: 0.1 10E3/UL (ref 0–0.2)
BASOPHILS NFR BLD AUTO: 2 %
EOSINOPHIL # BLD AUTO: 1.1 10E3/UL (ref 0–0.7)
EOSINOPHIL NFR BLD AUTO: 15 %
ERYTHROCYTE [DISTWIDTH] IN BLOOD BY AUTOMATED COUNT: 13 % (ref 10–15)
HCT VFR BLD AUTO: 45.6 % (ref 40–53)
HGB BLD-MCNC: 15.6 G/DL (ref 13.3–17.7)
IMM GRANULOCYTES # BLD: 0.1 10E3/UL
IMM GRANULOCYTES NFR BLD: 1 %
LYMPHOCYTES # BLD AUTO: 0.9 10E3/UL (ref 0.8–5.3)
LYMPHOCYTES NFR BLD AUTO: 14 %
MCH RBC QN AUTO: 32 PG (ref 26.5–33)
MCHC RBC AUTO-ENTMCNC: 34.2 G/DL (ref 31.5–36.5)
MCV RBC AUTO: 93 FL (ref 78–100)
MONOCYTES # BLD AUTO: 0.5 10E3/UL (ref 0–1.3)
MONOCYTES NFR BLD AUTO: 7 %
NEUTROPHILS # BLD AUTO: 4.2 10E3/UL (ref 1.6–8.3)
NEUTROPHILS NFR BLD AUTO: 61 %
NRBC # BLD AUTO: 0 10E3/UL
NRBC BLD AUTO-RTO: 0 /100
PLATELET # BLD AUTO: 188 10E3/UL (ref 150–450)
RBC # BLD AUTO: 4.88 10E6/UL (ref 4.4–5.9)
WBC # BLD AUTO: 6.8 10E3/UL (ref 4–11)

## 2021-09-10 PROCEDURE — 250N000013 HC RX MED GY IP 250 OP 250 PS 637: Performed by: PSYCHIATRY & NEUROLOGY

## 2021-09-10 PROCEDURE — 36415 COLL VENOUS BLD VENIPUNCTURE: CPT | Performed by: PSYCHIATRY & NEUROLOGY

## 2021-09-10 PROCEDURE — 99232 SBSQ HOSP IP/OBS MODERATE 35: CPT | Performed by: PSYCHIATRY & NEUROLOGY

## 2021-09-10 PROCEDURE — 124N000002 HC R&B MH UMMC

## 2021-09-10 PROCEDURE — 250N000013 HC RX MED GY IP 250 OP 250 PS 637: Performed by: PHYSICIAN ASSISTANT

## 2021-09-10 PROCEDURE — 85025 COMPLETE CBC W/AUTO DIFF WBC: CPT | Performed by: PSYCHIATRY & NEUROLOGY

## 2021-09-10 RX ADMIN — SALMETEROL XINAFOATE 1 PUFF: 50 POWDER, METERED ORAL; RESPIRATORY (INHALATION) at 21:51

## 2021-09-10 RX ADMIN — DOCUSATE SODIUM 100 MG: 100 CAPSULE, LIQUID FILLED ORAL at 21:50

## 2021-09-10 RX ADMIN — RIVASTIGMINE 1 PATCH: 4.6 PATCH TRANSDERMAL at 08:23

## 2021-09-10 RX ADMIN — BENZTROPINE MESYLATE 1 MG: 1 TABLET ORAL at 08:23

## 2021-09-10 RX ADMIN — GUAIFENESIN 600 MG: 600 TABLET, EXTENDED RELEASE ORAL at 21:49

## 2021-09-10 RX ADMIN — Medication 50 MCG: at 08:23

## 2021-09-10 RX ADMIN — ASPIRIN 81 MG CHEWABLE TABLET 81 MG: 81 TABLET CHEWABLE at 08:23

## 2021-09-10 RX ADMIN — METOPROLOL TARTRATE 25 MG: 25 TABLET, FILM COATED ORAL at 21:55

## 2021-09-10 RX ADMIN — GUAIFENESIN 600 MG: 600 TABLET, EXTENDED RELEASE ORAL at 08:24

## 2021-09-10 RX ADMIN — ATORVASTATIN CALCIUM 80 MG: 80 TABLET, FILM COATED ORAL at 21:50

## 2021-09-10 RX ADMIN — BENZTROPINE MESYLATE 1 MG: 1 TABLET ORAL at 21:49

## 2021-09-10 RX ADMIN — DOCUSATE SODIUM 100 MG: 100 CAPSULE, LIQUID FILLED ORAL at 08:24

## 2021-09-10 RX ADMIN — CLOZAPINE 300 MG: 100 TABLET ORAL at 21:49

## 2021-09-10 RX ADMIN — MEMANTINE 10 MG: 5 TABLET ORAL at 21:49

## 2021-09-10 RX ADMIN — METOPROLOL TARTRATE 25 MG: 25 TABLET, FILM COATED ORAL at 08:23

## 2021-09-10 RX ADMIN — LEVOTHYROXINE SODIUM 88 MCG: 88 TABLET ORAL at 08:23

## 2021-09-10 RX ADMIN — POLYETHYLENE GLYCOL 3350 17 G: 17 POWDER, FOR SOLUTION ORAL at 08:23

## 2021-09-10 RX ADMIN — MEMANTINE 10 MG: 5 TABLET ORAL at 08:24

## 2021-09-10 NOTE — PLAN OF CARE
Patient has spent majority of the shift in the milieu. Doesn't socialize with peers or staff. When attempting to check in with patient was talking under his breath and distracted. No stated SI or SIB. Ate dinner. Med compliant. Had one outburst yelling at no one in  particular, was able to calm himself down without any prn's.    Patient evaluation continues. Assessed mood,anxiety,thoughts and behavior.     Patient gradually progressing towards goals.    Patient is encouraged to participate in groups and assisted to develop healthy coping skills.     VS reviewed: /89   Pulse 88   Temp 96.8  F (36  C) (Tympanic)   Resp 16   Ht 1.829 m (6')   Wt 90 kg (198 lb 6.4 oz)   SpO2 97%   BMI 26.91 kg/m      Length of stay: 71    Refer to daily team meeting notes for individualized plan of care. Nursing will continue to assess.

## 2021-09-10 NOTE — PLAN OF CARE
Initially refused lab work, but agreed to lab draw after breakfast. In the milieu. Keeps to himself. Sits and drinks coffee throughout the morning. Needs requests repeated to him. Appears to be hearing voices. Mood remained calm. Observed patient grinning and looking into space, absorbed within himself.

## 2021-09-10 NOTE — PLAN OF CARE
Assessment/Intervention/Current Symtoms and Care Coordination  -Chart review  -Pre round meeting with team  -Rounded with team, addressed patient needs/concerns  Current Symptoms include the following: Psychosis, disorganization, and confusion    Pt is at a therapeutic level of Clozaril.   He does have tardive dyskinesia.      Discharge Plan or Goal  Considerations include:  nursing home    Barriers to Discharge  Patient requires further psychiatric stabilization due to current symptomology    Referral Status  Guardianship must be found before pt can go to a  locked nursing home    Legal Status  Patient is under MI commitment in [facility]

## 2021-09-10 NOTE — PROGRESS NOTES
"[facility], [place]   Psychiatric Progress Note  Hospital Day: 72        Interim History:   The patient's care was discussed with the treatment team during the daily team meeting and/or staff's chart notes were reviewed.  Staff report patient has been visible in the milieu but keeps to self, taking medications as prescribed, observed talking to self and had one outburst, was able to calm, declined lab draw this morning.     Upon interview, patient was out in lounge but agreed to meet in his room, he wanted to get some coffee prior to meeting. He met writer down in his room, he reported his mood was \"good\", he asked about \"getting out of here\" and reviewed that team is working on finding placement for discharge. He was then noted to be mumbling to self and started to look around room, he denied AVH but was noted to smile to self. He took almost a minute to get patient to attend to writer again. Discussed concern around his ongoing incontinence and not wearing briefs, he doesn't like the feeling of \"a diaper\" and discussed concern around possible health issues with ongoing incontinence and wet clothing. Discussed behavioral plan of utilizing briefs overnight with incentive of having food delivery of pizza next week if he does this nightly over weekend. He was in agreement with this. He is tolerating medications. Denied SI or HI. No additional concerns.          Medications:       aspirin  81 mg Oral Daily     atorvastatin  80 mg Oral At Bedtime     benztropine  1 mg Oral BID     cholecalciferol  1,250 mcg Oral Q7 Days     cloZAPine  300 mg Oral At Bedtime     docusate sodium  100 mg Oral BID     guaiFENesin  600 mg Oral BID     levothyroxine  88 mcg Oral Daily     memantine  10 mg Oral BID     metoprolol tartrate  25 mg Oral BID     polyethylene glycol  17 g Oral Daily     rivastigmine  1 patch Transdermal Daily     rivastigmine   Transdermal Q8H     salmeterol  1 puff Inhalation BID     " "vitamin D3  50 mcg Oral Daily          Allergies:     Allergies   Allergen Reactions     Ibuprofen      Latex           Labs:     No results found for this or any previous visit (from the past 48 hour(s)).       Psychiatric Examination:     /89   Pulse 88   Temp 96.8  F (36  C) (Tympanic)   Resp 16   Ht 1.829 m (6')   Wt 90 kg (198 lb 6.4 oz)   SpO2 97%   BMI 26.91 kg/m    Weight is 198 lbs 6.4 oz  Body mass index is 26.91 kg/m .    Orthostatic Vitals       Most Recent      Sitting Orthostatic /93 09/10 0809    Sitting Orthostatic Pulse (bpm) 88 09/10 0809    Standing Orthostatic /78 09/10 0809    Standing Orthostatic Pulse (bpm) 92 09/10 0809        Appearance: awake, alert and untidy  Attitude: somewhat cooperative  Eye Contact:  fair   Mood:  \"good\", irritable  Affect:  mood congruent and intensity is flat  Speech:  soft volume, raspy ,dysarthric   Language: fluent and intact in English  Psychomotor, Gait, Musculoskeletal:  no evidence of dystonia, or tics, continues to have movements of buccolingual dyskinesia present during interview  Thought Process: disorganized  Associations:  no loose associations  Thought Content:  no evidence of suicidal ideation or homicidal ideation and continues to be delusional and observed responding to internal stimuli during interview and looking around room   Insight:  limited  Judgement:  limited  Oriented to:  person  Attention Span and Concentration:  limited  Recent and Remote Memory:  limited  Fund of Knowledge:  delayed    Clinical Global Impressions  First:  Considering your total clinical experience with this particular patient population, how severe are the patient's symptoms at this time?: 7 (07/01/21 0630)  Compared to the patient's condition at the START of treatment, this patient's condition is: 4 (07/01/21 0630)  Most recent:  Considering your total clinical experience with this particular patient population, how severe are the patient's " symptoms at this time?: 6 (09/08/21 0551)  Compared to the patient's condition at the START of treatment, this patient's condition is: 3 (09/08/21 0551)           Precautions:     Behavioral Orders   Procedures     Assault precautions     Code 2     Elopement precautions     Routine Programming     As clinically indicated     Single Room     Status 15     Every 15 minutes.          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   Stimulant use disorder, in remission.   Transaminitis, possibly medication induced   Hx of CVA  Vit D deficiency  Hypertension  COPD  Hx of infective endocarditis with severe mitral and aortic regurgitation s/p biprostehtic valve replacement 2015  Hx of HFrEF now recovered  Tobacco use disorder  Non-severe malnutrition   Urinary incontinence         Assessment & Plan:   Assessment and hospital summary:  The patient is a 56yo male with a history of schizophrenia and TBI and multiple medical co-morbidities as above who was admitted after being transferred from [facility] after a nearly year-long stay. Is currently under commitment with [employer] recently amended to include Clozaril. While at [facility] was noted to have ongoing agitation and frequently attempting to elope from unit requiring 1:1 staffing for safety. He was started on 1:1 staff upon transfer, this was discontinued as patient has been more cooperative without elopement attempts. IM consult completed on admission and continued to follow due to elevated LFTs. Haldol and VPA discontinued due to LFTs. Cross titration completed from risperidone to clozaril after Yanez amended. EPSE/TD movements noted, have appeared stable over past few weeks. Patient has continued to have disorganization and active psychosis symptoms which appear to be at patients  baseline. Team continues to work on disposition which barriers include patient has no guardian or next of kin willing to act as surrogate decision maker, see CTC notes for complete details.     Psychiatric treatment/inteventions:  Medications:   -continue Clozaril 300mg at bedtime for psychosis and agitation. Will not adjust dose further at this time, movements consistent with TD noted, and level in therapeutic range   -continue benztropine 1mg BID for EPSE  -continue memantine 5mg daily for neurocognitive disorder  -continue risperdal 1mg Q6H PRN agitation   -continue lorazepam 0.5-1.0mg Q4H PRN anxiety or severe agitation  -continue rivastigmine patch started 8/2 to target neurocognitive disorder     Laboratory/Imaging: weekly WBC and ANC for clozapine monitoring continues, initially declined labs, agreeable to draw later, ANC today 4.2, next ANC 9/17; weekly covid negative 9/3     Patient will be treated in therapeutic milieu with appropriate individual and group therapies as described.     Medical treatment/interventions:  Medical concerns:   1) IM consult placed on admission, per consult note on 7/1/21:   Diagnoses:    #Major neurocognitive disorder dt hx of TBI and alcohol use disorder  #Schizophrenia  #Under commitment  Had been residing in group home prior to admission.  Brought to hospital for evaluation of aggressive behaviors. Group home now unwilling to take him back. Has had numerous CODE 21s called this stay. Seen by psych, meds vita. Is currently under civil commitment and court hold through [facility] until 7/31/21. Please see recent discharge summary for details on 6/30/20201.   -Management per psychiatry      #Vitamin D Deficiency- Vit D level 16. Has been in hospital since Sept 2020. Started on cholecalciferol 31059 units on 6/17. Continue high-dose weekly replacement for 6 weeks total. Following high dose replacement recommend starting vitamin D 2000 international unit(s)  daily replacement (start date 8/5/2021).      #Possible Tardive Dyskinesia vs EPSE- Per psych assessment on 4/1/21, noted to have possible tardive dyskinesia vs extrapyramidal side effects of meds.  - At that time, recommended to increase Cogentin to 1mg BID and add vitamin E 800 international unit(s) daily.      #Hx of CVA - hx of basal ganglia stroke in 2015. No focal neuro deficits aside from cognitive dysfunction as above.      #Hyperlipidemia - Continue PTA ASA 81 mg daily and atorvastatin 80 mg daily.      #COPD - Not O2 dependent. Continue PTA on salmeterol and albuterol PRN. Patient occasionally refusing inhalers, encourage compliance. Mucinex added for intermittent cough at [facility].      #Hypothyroidism- TSH 3.18 on 9/2020. Repeat recently on 5/29/2021 slightly elevated 5.18, with no T4 . Continue PTA levothyroxine 88 mcg daily. Repeat TSH with reflex to T4.      #Hx of infective endocarditis with severe mitral and aortic regurgitation S/P bioprosthetic valve replacement (10/2015)  #Hx of HFrEF, now recovered, not in acute exacerbation  Follows with Dr. Dockery of [facility] V[facility], last seen in 1/2020. Echocardiogram in 5/2016 with EF 50%, no evidence for prosthetic mitral and aortic valve dysfunction.   - Follow-up with outpatient Cardiology upon dismissal from the hospital (on every 2 year follow-ups).  - Continue ASA 81 mg daily      #Tobacco use disorder- Using nicotine patch and PRN gum.     #Non-severe malnutrition- Nutrition consulted and following at OSH.      Ordered CMP, CBC, and TSH with reflex.  No further recommendations at this time. Please page the on-call SHREE for any intercurrent medical issues which arise.      ADDENDUM: TSH normal. CBC normal. ALT elevated at 174, with unsatisfactory AST. Per chart review, LFTs last checked in December with  and . T bili and alk phos.  Reviewed with pharmacy, and possible medications causing would be Haldol, depakote, and  statin. Discussed with psych who will taper haldol and Depakote. Holding statin, will check LDL.  Will repeat LFTs in AM, and check CK. Medicine will follow up on repeat LFTs, and CK.      Ediht Alfaro PA-C  Hospitalist Service     2) Per updated progress note by IM 7/4:   A/P:  #Transaminitis - slowly rising LFTs since December. Asymptomatic. Reviewed with pharmacy, and possible medications causing would be Haldol, depakote, and statin. Discussed with psych who will taper haldol and Depakote. Holding statin, (total cholesterol 91, with LDL 41). Repeat LFTs continue to rise slightly.   -Abdominal US tomorrow (NPO after midnight)  -Hepatitis B surface ab and agn and Hep C ab   -Repeat LFTs in 3 days     Medicine will follow up on Abdominal US, viral hepatitis studies and repeat LFTs.  No further recommendations at this time. Please page the on-call SHREE for any intercurrent medical issues which arise.      Edith Alfaro PA-C  Hospitalist Service     Per progress note 7/12:      Brief Medicine Note     Medicine following peripherally for LFT monitoring.  LFTs today improved:  (182) and ALT 92 (129). T bili and ALP WNL.      Today's vital signs, medications, and nursing notes were reviewed.       /77 (BP Location: Left arm)   Pulse 110   Temp 97.8  F (36.6  C) (Oral)   Resp 16   Ht 1.829 m (6')   Wt 87.5 kg (192 lb 14.4 oz)   SpO2 95%   BMI 26.16 kg/m       Continue current plan of care. Will continue to trend CMP In 3 days to ensure continued downtrend.      Kim Harman PA-C  Hospitalist Service        3) Urinary incontinence and stool incontinence: RN staff first reported urinary incontinence over weekend 7/9, per chart review patient has history of intermittent urinary incontinence going back to at least 2016, continues to have intermittent episodes of incontinence, continues to have both urinary and stool incontinence, placed IM consult for further assessment of need for additional  work up given pts limitations as historian, appreciate assistance, no abnormalities noted on evaluation including bladder scan. Patient declining to wear incontinence briefs most days, staff continuing to encourage and will start behavioral plan as above in interim history.    4) Unvaccinated for Covid-19, see interim history of note 8/19 by writer, attempted to discuss R/B/A of vaccination with patient, he did not demonstrate capacity, has no next of kin acting as surrogate decision maker, is awaiting guardianship, given patients age and medical co-morbidities the risks of receiving Covid vaccine are outweighed by the benefits, including patient is required to be vaccinated to be considered for discharge to some nursing home facilities which would be a more appropriate milieu and less restrictive environment for patient than locked inpatient psychiatric unit. Dr. Nickerson provided second opinion regarding administration of Covid vaccine on 8.20 and agrees with recommendation.   -Covid vaccine order placed 8/23, J&J vaccine given 8/24     This note was created by undersigned using a Dragon dictation system. All typing errors or contextual distortion are unintentional and software inherent.     Disposition Plan   Reason for ongoing admission: is unable to care for self due to severe psychosis or mariusz and neurocognitive disorder  Discharge location: TBD, likely locked NH facility , unable to progress on discharge planning due to guardianship not being in place, see CTC notes  Discharge Medications: not ordered  Follow-up Appointments: not scheduled  Legal Status: Full MI commitment and [facility]anez     Entered by: Jeanette Dobbins on 9/10/2021 at 8:00 AM

## 2021-09-11 PROCEDURE — 250N000013 HC RX MED GY IP 250 OP 250 PS 637: Performed by: PHYSICIAN ASSISTANT

## 2021-09-11 PROCEDURE — 250N000013 HC RX MED GY IP 250 OP 250 PS 637: Performed by: PSYCHIATRY & NEUROLOGY

## 2021-09-11 PROCEDURE — 124N000002 HC R&B MH UMMC

## 2021-09-11 RX ADMIN — METOPROLOL TARTRATE 25 MG: 25 TABLET, FILM COATED ORAL at 21:51

## 2021-09-11 RX ADMIN — MEMANTINE 10 MG: 5 TABLET ORAL at 08:40

## 2021-09-11 RX ADMIN — SALMETEROL XINAFOATE 1 PUFF: 50 POWDER, METERED ORAL; RESPIRATORY (INHALATION) at 21:52

## 2021-09-11 RX ADMIN — DOCUSATE SODIUM 100 MG: 100 CAPSULE, LIQUID FILLED ORAL at 08:40

## 2021-09-11 RX ADMIN — POLYETHYLENE GLYCOL 3350 17 G: 17 POWDER, FOR SOLUTION ORAL at 08:39

## 2021-09-11 RX ADMIN — BENZTROPINE MESYLATE 1 MG: 1 TABLET ORAL at 21:51

## 2021-09-11 RX ADMIN — RIVASTIGMINE 1 PATCH: 4.6 PATCH TRANSDERMAL at 08:39

## 2021-09-11 RX ADMIN — METOPROLOL TARTRATE 25 MG: 25 TABLET, FILM COATED ORAL at 08:40

## 2021-09-11 RX ADMIN — Medication 50 MCG: at 08:39

## 2021-09-11 RX ADMIN — ATORVASTATIN CALCIUM 80 MG: 80 TABLET, FILM COATED ORAL at 21:51

## 2021-09-11 RX ADMIN — GUAIFENESIN 600 MG: 600 TABLET, EXTENDED RELEASE ORAL at 21:51

## 2021-09-11 RX ADMIN — GUAIFENESIN 600 MG: 600 TABLET, EXTENDED RELEASE ORAL at 08:39

## 2021-09-11 RX ADMIN — ASPIRIN 81 MG CHEWABLE TABLET 81 MG: 81 TABLET CHEWABLE at 08:39

## 2021-09-11 RX ADMIN — MEMANTINE 10 MG: 5 TABLET ORAL at 21:51

## 2021-09-11 RX ADMIN — SALMETEROL XINAFOATE 1 PUFF: 50 POWDER, METERED ORAL; RESPIRATORY (INHALATION) at 08:56

## 2021-09-11 RX ADMIN — DOCUSATE SODIUM 100 MG: 100 CAPSULE, LIQUID FILLED ORAL at 21:51

## 2021-09-11 RX ADMIN — BENZTROPINE MESYLATE 1 MG: 1 TABLET ORAL at 08:40

## 2021-09-11 RX ADMIN — LEVOTHYROXINE SODIUM 88 MCG: 88 TABLET ORAL at 08:40

## 2021-09-11 RX ADMIN — CLOZAPINE 300 MG: 100 TABLET ORAL at 21:50

## 2021-09-11 ASSESSMENT — ACTIVITIES OF DAILY LIVING (ADL)
DRESS: INDEPENDENT
LAUNDRY: WITH SUPERVISION
LAUNDRY: UNABLE TO COMPLETE
HYGIENE/GROOMING: INDEPENDENT;PROMPTS
ORAL_HYGIENE: INDEPENDENT
HYGIENE/GROOMING: INDEPENDENT
ORAL_HYGIENE: INDEPENDENT;PROMPTS
DRESS: INDEPENDENT

## 2021-09-11 NOTE — PLAN OF CARE
Patient has spent majority of the evening in the milieu. Patient denies suicidal ideation and self injurious thoughts. Denies anxiety and depression. Denies auditory and visual hallucinations. Continues to talk to himself and is distracted. Patient used a pull up tonight. Med compliant.    Patient evaluation continues. Assessed mood,anxiety,thoughts and behavior.     Patient gradually progressing towards goals.    Patient is encouraged to participate in groups and assisted to develop healthy coping skills.     VS reviewed: /75   Pulse 92   Temp 98.2  F (36.8  C) (Oral)   Resp 16   Ht 1.829 m (6')   Wt 90 kg (198 lb 6.4 oz)   SpO2 96%   BMI 26.91 kg/m      Length of stay: 72    Refer to daily team meeting notes for individualized plan of care. Nursing will continue to assess.

## 2021-09-11 NOTE — PLAN OF CARE
"Patient spending 75% of day in the milieu. Compliant with medication,eating well at meals, does not initiate conversation with anyone. Encouraged to shower and he he replied,\"later.\" Patient asked again later and agreed to shower which he does independently after being set up with shower supplies. Displays flat affect, calm mood.  "

## 2021-09-12 PROCEDURE — 250N000013 HC RX MED GY IP 250 OP 250 PS 637: Performed by: PSYCHIATRY & NEUROLOGY

## 2021-09-12 PROCEDURE — 250N000013 HC RX MED GY IP 250 OP 250 PS 637: Performed by: PHYSICIAN ASSISTANT

## 2021-09-12 PROCEDURE — 124N000002 HC R&B MH UMMC

## 2021-09-12 RX ADMIN — POLYETHYLENE GLYCOL 3350 17 G: 17 POWDER, FOR SOLUTION ORAL at 09:05

## 2021-09-12 RX ADMIN — SALMETEROL XINAFOATE 1 PUFF: 50 POWDER, METERED ORAL; RESPIRATORY (INHALATION) at 09:10

## 2021-09-12 RX ADMIN — BENZTROPINE MESYLATE 1 MG: 1 TABLET ORAL at 09:05

## 2021-09-12 RX ADMIN — SALMETEROL XINAFOATE 1 PUFF: 50 POWDER, METERED ORAL; RESPIRATORY (INHALATION) at 21:40

## 2021-09-12 RX ADMIN — MEMANTINE 10 MG: 5 TABLET ORAL at 21:39

## 2021-09-12 RX ADMIN — METOPROLOL TARTRATE 25 MG: 25 TABLET, FILM COATED ORAL at 09:06

## 2021-09-12 RX ADMIN — GUAIFENESIN 600 MG: 600 TABLET, EXTENDED RELEASE ORAL at 09:06

## 2021-09-12 RX ADMIN — ASPIRIN 81 MG CHEWABLE TABLET 81 MG: 81 TABLET CHEWABLE at 09:05

## 2021-09-12 RX ADMIN — BENZTROPINE MESYLATE 1 MG: 1 TABLET ORAL at 21:39

## 2021-09-12 RX ADMIN — Medication 50 MCG: at 09:05

## 2021-09-12 RX ADMIN — MEMANTINE 10 MG: 5 TABLET ORAL at 09:05

## 2021-09-12 RX ADMIN — DOCUSATE SODIUM 100 MG: 100 CAPSULE, LIQUID FILLED ORAL at 21:39

## 2021-09-12 RX ADMIN — ATORVASTATIN CALCIUM 80 MG: 80 TABLET, FILM COATED ORAL at 21:40

## 2021-09-12 RX ADMIN — METOPROLOL TARTRATE 25 MG: 25 TABLET, FILM COATED ORAL at 21:42

## 2021-09-12 RX ADMIN — RIVASTIGMINE 1 PATCH: 4.6 PATCH TRANSDERMAL at 09:06

## 2021-09-12 RX ADMIN — CLOZAPINE 300 MG: 100 TABLET ORAL at 21:39

## 2021-09-12 RX ADMIN — DOCUSATE SODIUM 100 MG: 100 CAPSULE, LIQUID FILLED ORAL at 09:05

## 2021-09-12 RX ADMIN — GUAIFENESIN 600 MG: 600 TABLET, EXTENDED RELEASE ORAL at 21:40

## 2021-09-12 RX ADMIN — LEVOTHYROXINE SODIUM 88 MCG: 88 TABLET ORAL at 09:05

## 2021-09-12 ASSESSMENT — ACTIVITIES OF DAILY LIVING (ADL)
ORAL_HYGIENE: INDEPENDENT
LAUNDRY: UNABLE TO COMPLETE
DRESS: INDEPENDENT
LAUNDRY: UNABLE TO COMPLETE
HYGIENE/GROOMING: INDEPENDENT;PROMPTS
HYGIENE/GROOMING: INDEPENDENT
ORAL_HYGIENE: INDEPENDENT
DRESS: SCRUBS (BEHAVIORAL HEALTH);INDEPENDENT

## 2021-09-12 NOTE — PLAN OF CARE
Sits quietly in milieu watching football/TV. Speaks infrequently. Mood is calm. Agreed that he wore his pull-ups last night but bed was wet. Eats well at mealtime. Compliant with medication.

## 2021-09-12 NOTE — PROGRESS NOTES
Pt slept most of the night and was awake at 5 am to request incontinence briefs and pants.  Pt appeared appropriately dressed and his pants did not appear soiled.  He mumbled that he ran out of briefs then slowly walked back to his room.  Staff also provided him with towels.

## 2021-09-12 NOTE — PLAN OF CARE
Patient has spent majority of the shift in the milieu. Patient denies suicidal ideation and self injurious thoughts. Denies anxiety and depression. Denies auditory and visual hallucinations. Distracted and needs to be spoken to several times before he responds. Talks under his breathe. Ate dinner. Med compliant. Agreed to wear his pull-up this evening.     Patient evaluation continues. Assessed mood,anxiety,thoughts and behavior.     Patient gradually progressing towards goals.    Patient is encouraged to participate in groups and assisted to develop healthy coping skills.     VS reviewed: BP 99/80   Pulse 76   Temp 98.2  F (36.8  C) (Oral)   Resp 16   Ht 1.829 m (6')   Wt 90 kg (198 lb 6.4 oz)   SpO2 97%   BMI 26.91 kg/m      Length of stay: 73    Refer to daily team meeting notes for individualized plan of care. Nursing will continue to assess.

## 2021-09-13 LAB — SARS-COV-2 RNA RESP QL NAA+PROBE: NEGATIVE

## 2021-09-13 PROCEDURE — 124N000002 HC R&B MH UMMC

## 2021-09-13 PROCEDURE — 250N000013 HC RX MED GY IP 250 OP 250 PS 637: Performed by: PHYSICIAN ASSISTANT

## 2021-09-13 PROCEDURE — 250N000013 HC RX MED GY IP 250 OP 250 PS 637: Performed by: PSYCHIATRY & NEUROLOGY

## 2021-09-13 PROCEDURE — U0003 INFECTIOUS AGENT DETECTION BY NUCLEIC ACID (DNA OR RNA); SEVERE ACUTE RESPIRATORY SYNDROME CORONAVIRUS 2 (SARS-COV-2) (CORONAVIRUS DISEASE [COVID-19]), AMPLIFIED PROBE TECHNIQUE, MAKING USE OF HIGH THROUGHPUT TECHNOLOGIES AS DESCRIBED BY CMS-2020-01-R: HCPCS | Performed by: PSYCHIATRY & NEUROLOGY

## 2021-09-13 PROCEDURE — 99232 SBSQ HOSP IP/OBS MODERATE 35: CPT | Performed by: PSYCHIATRY & NEUROLOGY

## 2021-09-13 RX ADMIN — RISPERIDONE 1 MG: 1 TABLET, ORALLY DISINTEGRATING ORAL at 13:40

## 2021-09-13 RX ADMIN — DOCUSATE SODIUM 100 MG: 100 CAPSULE, LIQUID FILLED ORAL at 08:26

## 2021-09-13 RX ADMIN — BENZTROPINE MESYLATE 1 MG: 1 TABLET ORAL at 21:56

## 2021-09-13 RX ADMIN — LEVOTHYROXINE SODIUM 88 MCG: 88 TABLET ORAL at 08:26

## 2021-09-13 RX ADMIN — METOPROLOL TARTRATE 25 MG: 25 TABLET, FILM COATED ORAL at 21:56

## 2021-09-13 RX ADMIN — GUAIFENESIN 600 MG: 600 TABLET, EXTENDED RELEASE ORAL at 21:56

## 2021-09-13 RX ADMIN — POLYETHYLENE GLYCOL 3350 17 G: 17 POWDER, FOR SOLUTION ORAL at 08:35

## 2021-09-13 RX ADMIN — ASPIRIN 81 MG CHEWABLE TABLET 81 MG: 81 TABLET CHEWABLE at 08:26

## 2021-09-13 RX ADMIN — SALMETEROL XINAFOATE 1 PUFF: 50 POWDER, METERED ORAL; RESPIRATORY (INHALATION) at 21:56

## 2021-09-13 RX ADMIN — BENZTROPINE MESYLATE 1 MG: 1 TABLET ORAL at 08:26

## 2021-09-13 RX ADMIN — METOPROLOL TARTRATE 25 MG: 25 TABLET, FILM COATED ORAL at 08:26

## 2021-09-13 RX ADMIN — MEMANTINE 10 MG: 5 TABLET ORAL at 21:56

## 2021-09-13 RX ADMIN — METOPROLOL TARTRATE 25 MG: 25 TABLET, FILM COATED ORAL at 08:27

## 2021-09-13 RX ADMIN — ATORVASTATIN CALCIUM 80 MG: 80 TABLET, FILM COATED ORAL at 21:56

## 2021-09-13 RX ADMIN — GUAIFENESIN 600 MG: 600 TABLET, EXTENDED RELEASE ORAL at 08:26

## 2021-09-13 RX ADMIN — RIVASTIGMINE 1 PATCH: 4.6 PATCH TRANSDERMAL at 08:25

## 2021-09-13 RX ADMIN — CLOZAPINE 300 MG: 100 TABLET ORAL at 21:56

## 2021-09-13 RX ADMIN — DOCUSATE SODIUM 100 MG: 100 CAPSULE, LIQUID FILLED ORAL at 21:56

## 2021-09-13 RX ADMIN — MEMANTINE 10 MG: 5 TABLET ORAL at 08:26

## 2021-09-13 RX ADMIN — Medication 50 MCG: at 08:26

## 2021-09-13 ASSESSMENT — ACTIVITIES OF DAILY LIVING (ADL)
LAUNDRY: UNABLE TO COMPLETE
HYGIENE/GROOMING: INDEPENDENT
ORAL_HYGIENE: INDEPENDENT
ORAL_HYGIENE: PROMPTS
HYGIENE/GROOMING: INDEPENDENT
DRESS: SCRUBS (BEHAVIORAL HEALTH)
DRESS: INDEPENDENT
LAUNDRY: UNABLE TO COMPLETE

## 2021-09-13 NOTE — PLAN OF CARE
Assessment/Intervention/Current Symtoms and Care Coordination  -Chart review  -Pre round meeting with team  -Rounded with team, addressed patient needs/concerns  -Post round meeting with team  Current Symptoms include the following: Psychosis, disorganization and confusion      Pt signed Jeremy for rep payee.  Pt will be getting pizza as part of the behavioral contract of wearing depends.  I left a vm for:  Rep Payee  [employer]  [address]  [place] [address]  Phone: [phone]  Fax: [phone]    Madan called me back. She was surprised that no one had updated her that the pt has been transferred to another hospital. She said that since the pt only gets SSI which is to meet his basic needs and he is getting his basic needs met in the hospital he is only getting $30 a month. He does not get disability income as neither he nor his parents have a work his darrell.  When he is not in the hospital he gets $794. They have only been his rep payee since May so there is only $150 in his account.    She says that the pt will only get $30 a month when he goes to a nursing home.  She does not feel that there is enough to pay for pizza.  Today RN paid for the pizza from her personal funds as the RN manager was not available.      Pt was in the lounge sitting by himself shouting and using profanity this afternoon.    Discharge Plan or Goal  Memory care    Barriers to Discharge  Patient requires further psychiatric stabilization due to current symptomology    Referral Status  memory care    Legal Status  Patient is under MI commitment in [facility]

## 2021-09-13 NOTE — PLAN OF CARE
"Mumbled to staff this am that he was going to be picked up later Said,\"I've been here over six months......\" [facility]. Accepted doing a COVID nasal swab. Responded with yelling \"go on fuck,\" when given repeated directions.Watching TV with others in [facility].medication compliant.  "

## 2021-09-13 NOTE — PLAN OF CARE
Patient has spent majority of the shift in the milieu. Patient keeps to himself. Patient denies suicidal ideation and self injurious thoughts. Denies anxiety and depression. Denies auditory and visual hallucinations. Ate dinner. Med compliant. Continues to be distracted and confused. Talks to himself. Patient agreed to wear his pull up and was confirmed he had it on by writer. Cooperative on unit.     Patient evaluation continues. Assessed mood,anxiety,thoughts and behavior.     Patient gradually progressing towards goals.    Patient is encouraged to participate in groups and assisted to develop healthy coping skills.     VS reviewed: /81   Pulse 93   Temp 97.5  F (36.4  C) (Oral)   Resp 16   Ht 1.829 m (6')   Wt 90 kg (198 lb 6.4 oz)   SpO2 97%   BMI 26.91 kg/m      Length of stay: 74    Refer to daily team meeting notes for individualized plan of care. Nursing will continue to assess.

## 2021-09-13 NOTE — PROGRESS NOTES
"[facility], [place]   Psychiatric Progress Note  Hospital Day: 75        Interim History:   The patient's care was discussed with the treatment team during the daily team meeting and/or staff's chart notes were reviewed.  Staff report patient has been visible in the milieu, confirmed to have worn briefs at least 2 of 3 nights, one of the nights was noted to have soiled bed, took shower on Friday, taking medications as prescribed, continues to appear to be responding to internal stimuli, no acute events overnight.     Upon interview, pt was lying in bed resting, he was noted to have incontinence briefs on. He continues to be agreeable to wearing them and is incentivised by behavioral contract to have pizza provided weekly if this continues. He would like to have pizza today and discussed what types of pizza he enjoys. He then became abruptly irritable talking about \"waiting for [his] ride\" and that \"6 months is too long\" to be in the hospital. Writer provided some reorientation and explanation for ongoing stay. He then stated he is going to hire [employer] to represent him and get him out of the hospital and if he can't leave he is going to shoot someone. He denied having any specific targets, denied having any thoughts of harming others on unit. He denied SI. He did endorse  today stating they \"are still there\". Tolerating medications. Continues to deny noticing TD movements that are evident on exam, they are not bothersome. No additional concerns.          Medications:       aspirin  81 mg Oral Daily     atorvastatin  80 mg Oral At Bedtime     benztropine  1 mg Oral BID     cholecalciferol  1,250 mcg Oral Q7 Days     cloZAPine  300 mg Oral At Bedtime     docusate sodium  100 mg Oral BID     guaiFENesin  600 mg Oral BID     levothyroxine  88 mcg Oral Daily     memantine  10 mg Oral BID     metoprolol tartrate  25 mg Oral BID     polyethylene glycol  17 g Oral Daily     rivastigmine "  1 patch Transdermal Daily     rivastigmine   Transdermal Q8H     salmeterol  1 puff Inhalation BID     vitamin D3  50 mcg Oral Daily          Allergies:     Allergies   Allergen Reactions     Ibuprofen      Latex           Labs:     No results found for this or any previous visit (from the past 48 hour(s)).       Psychiatric Examination:     /81   Pulse 93   Temp 97.5  F (36.4  C) (Oral)   Resp 16   Ht 1.829 m (6')   Wt 90 kg (198 lb 6.4 oz)   SpO2 97%   BMI 26.91 kg/m    Weight is 198 lbs 6.4 oz  Body mass index is 26.91 kg/m .    Orthostatic Vitals       Most Recent      Sitting Orthostatic /88 09/13 0823    Sitting Orthostatic Pulse (bpm) 102 09/13 0823    Standing Orthostatic /80 09/13 0823    Standing Orthostatic Pulse (bpm) 110 09/13 0823        Appearance: awake, alert and untidy  Attitude: somewhat cooperative  Eye Contact:  fair   Mood:  irritable  Affect:  mood congruent and intensity is flat  Speech:  soft volume, raspy ,dysarthric   Language: fluent and intact in English  Psychomotor, Gait, Musculoskeletal:  no evidence of dystonia, or tics, continues to have movements of buccolingual dyskinesia present during interview  Thought Process: disorganized  Associations:  no loose associations  Thought Content:  no evidence of suicidal ideation, patient made non-specific statements about shooting someone; denies specific HI; continues to be delusional and observed responding to internal stimuli during interview  Insight:  limited  Judgement:  limited  Oriented to:  person  Attention Span and Concentration:  limited  Recent and Remote Memory:  limited  Fund of Knowledge:  delayed    Clinical Global Impressions  First:  Considering your total clinical experience with this particular patient population, how severe are the patient's symptoms at this time?: 7 (07/01/21 0630)  Compared to the patient's condition at the START of treatment, this patient's condition is: 4 (07/01/21 0630)  Most  recent:  Considering your total clinical experience with this particular patient population, how severe are the patient's symptoms at this time?: 6 (09/08/21 0551)  Compared to the patient's condition at the START of treatment, this patient's condition is: 3 (09/08/21 0551)           Precautions:     Behavioral Orders   Procedures     Assault precautions     Code 1     Elopement precautions     Routine Programming     As clinically indicated     Single Room     Status 15     Every 15 minutes.          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   Stimulant use disorder, in remission.   Transaminitis, possibly medication induced   Hx of CVA  Vit D deficiency  Hypertension  COPD  Hx of infective endocarditis with severe mitral and aortic regurgitation s/p biprostehtic valve replacement 2015  Hx of HFrEF now recovered  Tobacco use disorder  Non-severe malnutrition   Urinary incontinence         Assessment & Plan:   Assessment and hospital summary:  The patient is a 56yo male with a history of schizophrenia and TBI and multiple medical co-morbidities as above who was admitted after being transferred from [facility] medical [facility] after a nearly year-long stay. Is currently under commitment with [employer] recently amended to include Clozaril. While at [facility] was noted to have ongoing agitation and frequently attempting to elope from unit requiring 1:1 staffing for safety. He was started on 1:1 staff upon transfer, this was discontinued as patient has been more cooperative without elopement attempts. IM consult completed on admission and continued to follow due to elevated LFTs. Haldol and VPA discontinued due to LFTs. Cross titration completed from risperidone to clozaril after Yanez amended. EPSE/TD movements noted, have appeared stable over  past few weeks. Patient has continued to have disorganization and active psychosis symptoms which appear to be at patients baseline. Team continues to work on disposition which barriers include patient has no guardian or next of kin willing to act as surrogate decision maker, see CTC notes for complete details.     Psychiatric treatment/inteventions:  Medications:   -continue Clozaril 300mg at bedtime for psychosis and agitation. Will not adjust dose further at this time, movements consistent with TD noted, and level in therapeutic range   -continue benztropine 1mg BID for EPSE  -continue memantine 5mg daily for neurocognitive disorder  -continue risperdal 1mg Q6H PRN agitation   -continue lorazepam 0.5-1.0mg Q4H PRN anxiety or severe agitation  -continue rivastigmine patch started 8/2 to target neurocognitive disorder     Laboratory/Imaging: weekly WBC and ANC for clozapine monitoring continues, initially declined labs, agreeable to draw later, ANC 9/10 was 4.2, next ANC 9/17; weekly covid ordered     Patient will be treated in therapeutic milieu with appropriate individual and group therapies as described.     Medical treatment/interventions:  Medical concerns:   1) IM consult placed on admission, per consult note on 7/1/21:   Diagnoses:    #Major neurocognitive disorder dt hx of TBI and alcohol use disorder  #Schizophrenia  #Under commitment  Had been residing in group home prior to admission.  Brought to hospital for evaluation of aggressive behaviors. Group home now unwilling to take him back. Has had numerous CODE 21s called this stay. Seen by psych, meds adjusted. Is currently under civil commitment and court hold through [facility] until 7/31/21. Please see recent discharge summary for details on 6/30/20201.   -Management per psychiatry      #Vitamin D Deficiency- Vit D level 16. Has been in hospital since Sept 2020. Started on cholecalciferol 12564 units on 6/17. Continue high-dose weekly replacement  for 6 weeks total. Following high dose replacement recommend starting vitamin D 2000 international unit(s) daily replacement (start date 8/5/2021).      #Possible Tardive Dyskinesia vs EPSE- Per psych assessment on 4/1/21, noted to have possible tardive dyskinesia vs extrapyramidal side effects of meds.  - At that time, recommended to increase Cogentin to 1mg BID and add vitamin E 800 international unit(s) daily.      #Hx of CVA - hx of basal ganglia stroke in 2015. No focal neuro deficits aside from cognitive dysfunction as above.      #Hyperlipidemia - Continue PTA ASA 81 mg daily and atorvastatin 80 mg daily.      #COPD - Not O2 dependent. Continue PTA on salmeterol and albuterol PRN. Patient occasionally refusing inhalers, encourage compliance. Mucinex added for intermittent cough at [facility].      #Hypothyroidism- TSH 3.18 on 9/2020. Repeat recently on 5/29/2021 slightly elevated 5.18, with no T4 . Continue PTA levothyroxine 88 mcg daily. Repeat TSH with reflex to T4.      #Hx of infective endocarditis with severe mitral and aortic regurgitation S/P bioprosthetic valve replacement (10/2015)  #Hx of HFrEF, now recovered, not in acute exacerbation  Follows with Dr. Dockery of Heart and Vascular Cardiology, last seen in 1/2020. Echocardiogram in 5/2016 with EF 50%, no evidence for prosthetic mitral and aortic valve dysfunction.   - Follow-up with outpatient Cardiology upon dismissal from the hospital (on every 2 year follow-ups).  - Continue ASA 81 mg daily      #Tobacco use disorder- Using nicotine patch and PRN gum.     #Non-severe malnutrition- Nutrition consulted and following at OSH.      Ordered CMP, CBC, and TSH with reflex.  No further recommendations at this time. Please page the on-call SHREE for any intercurrent medical issues which arise.      ADDENDUM: TSH normal. CBC normal. ALT elevated at 174, with unsatisfactory AST. Per chart review, LFTs last checked in December with  and . T bili  and alk phos.  Reviewed with pharmacy, and possible medications causing would be Haldol, depakote, and statin. Discussed with psych who will taper haldol and Depakote. Holding statin, will check LDL.  Will repeat LFTs in AM, and check CK. Medicine will follow up on repeat LFTs, and CK.      Edith Alfaro PA-C  Hospitalist Service     2) Per updated progress note by IM 7/4:   A/P:  #Transaminitis - slowly rising LFTs since December. Asymptomatic. Reviewed with pharmacy, and possible medications causing would be Haldol, depakote, and statin. Discussed with psych who will taper haldol and Depakote. Holding statin, (total cholesterol 91, with LDL 41). Repeat LFTs continue to rise slightly.   -Abdominal US tomorrow (NPO after midnight)  -Hepatitis B surface ab and agn and Hep C ab   -Repeat LFTs in 3 days     Medicine will follow up on Abdominal US, viral hepatitis studies and repeat LFTs.  No further recommendations at this time. Please page the on-call SHREE for any intercurrent medical issues which arise.      Edith Alfaro PA-C  Hospitalist Service     Per progress note 7/12:      Brief Medicine Note     Medicine following peripherally for LFT monitoring.  LFTs today improved:  (182) and ALT 92 (129). T bili and ALP WNL.      Today's vital signs, medications, and nursing notes were reviewed.       /77 (BP Location: Left arm)   Pulse 110   Temp 97.8  F (36.6  C) (Oral)   Resp 16   Ht 1.829 m (6')   Wt 87.5 kg (192 lb 14.4 oz)   SpO2 95%   BMI 26.16 kg/m       Continue current plan of care. Will continue to trend CMP In 3 days to ensure continued downtrend.      Kim Harman PA-C  Hospitalist Service        3) Urinary incontinence and stool incontinence: RN staff first reported urinary incontinence over weekend 7/9, per chart review patient has history of intermittent urinary incontinence going back to at least 2016, continues to have intermittent episodes of incontinence, continues to  have both urinary and stool incontinence, placed IM consult for further assessment of need for additional work up given pts limitations as historian, appreciate assistance, no abnormalities noted on evaluation including bladder scan. Patient declining to wear incontinence briefs most days, staff continuing to encourage and started behavioral plan 9/10 to provide patient with pizza 1x per week for cooperation with incontinence cares.    4) Unvaccinated for Covid-19, see interim history of note 8/19 by writer, attempted to discuss R/B/A of vaccination with patient, he did not demonstrate capacity, has no next of kin acting as surrogate decision maker, is awaiting guardianship, given patients age and medical co-morbidities the risks of receiving Covid vaccine are outweighed by the benefits, including patient is required to be vaccinated to be considered for discharge to some nursing home facilities which would be a more appropriate milieu and less restrictive environment for patient than locked inpatient psychiatric unit. Dr. Nickerson provided second opinion regarding administration of Covid vaccine on 8.20 and agrees with recommendation.   -Covid vaccine order placed 8/23, J&J vaccine given 8/24     This note was created by undersigned using a Dragon dictation system. All typing errors or contextual distortion are unintentional and software inherent.     Disposition Plan   Reason for ongoing admission: is unable to care for self due to severe psychosis or mariusz and neurocognitive disorder  Discharge location: D, likely locked NH facility , unable to progress on discharge planning due to guardianship not being in place, see CTC notes  Discharge Medications: not ordered  Follow-up Appointments: not scheduled  Legal Status: Full MI commitment and Yanez     Entered by: Jeanette Dobbins on 9/13/2021 at 7:53 AM

## 2021-09-14 PROCEDURE — 250N000013 HC RX MED GY IP 250 OP 250 PS 637: Performed by: PSYCHIATRY & NEUROLOGY

## 2021-09-14 PROCEDURE — 124N000002 HC R&B MH UMMC

## 2021-09-14 PROCEDURE — 250N000013 HC RX MED GY IP 250 OP 250 PS 637: Performed by: PHYSICIAN ASSISTANT

## 2021-09-14 RX ADMIN — MEMANTINE 10 MG: 5 TABLET ORAL at 21:01

## 2021-09-14 RX ADMIN — DOCUSATE SODIUM 100 MG: 100 CAPSULE, LIQUID FILLED ORAL at 08:39

## 2021-09-14 RX ADMIN — DOCUSATE SODIUM 100 MG: 100 CAPSULE, LIQUID FILLED ORAL at 21:01

## 2021-09-14 RX ADMIN — LEVOTHYROXINE SODIUM 88 MCG: 88 TABLET ORAL at 08:39

## 2021-09-14 RX ADMIN — CLOZAPINE 300 MG: 100 TABLET ORAL at 21:01

## 2021-09-14 RX ADMIN — SALMETEROL XINAFOATE 1 PUFF: 50 POWDER, METERED ORAL; RESPIRATORY (INHALATION) at 08:40

## 2021-09-14 RX ADMIN — MEMANTINE 10 MG: 5 TABLET ORAL at 08:39

## 2021-09-14 RX ADMIN — BENZTROPINE MESYLATE 1 MG: 1 TABLET ORAL at 21:01

## 2021-09-14 RX ADMIN — ATORVASTATIN CALCIUM 80 MG: 80 TABLET, FILM COATED ORAL at 21:01

## 2021-09-14 RX ADMIN — METOPROLOL TARTRATE 25 MG: 25 TABLET, FILM COATED ORAL at 08:39

## 2021-09-14 RX ADMIN — Medication 50 MCG: at 08:39

## 2021-09-14 RX ADMIN — METOPROLOL TARTRATE 25 MG: 25 TABLET, FILM COATED ORAL at 21:01

## 2021-09-14 RX ADMIN — RIVASTIGMINE 1 PATCH: 4.6 PATCH TRANSDERMAL at 08:40

## 2021-09-14 RX ADMIN — SALMETEROL XINAFOATE 1 PUFF: 50 POWDER, METERED ORAL; RESPIRATORY (INHALATION) at 21:02

## 2021-09-14 RX ADMIN — ASPIRIN 81 MG CHEWABLE TABLET 81 MG: 81 TABLET CHEWABLE at 08:39

## 2021-09-14 RX ADMIN — BENZTROPINE MESYLATE 1 MG: 1 TABLET ORAL at 08:39

## 2021-09-14 RX ADMIN — GUAIFENESIN 600 MG: 600 TABLET, EXTENDED RELEASE ORAL at 08:39

## 2021-09-14 RX ADMIN — GUAIFENESIN 600 MG: 600 TABLET, EXTENDED RELEASE ORAL at 21:01

## 2021-09-14 RX ADMIN — POLYETHYLENE GLYCOL 3350 17 G: 17 POWDER, FOR SOLUTION ORAL at 08:40

## 2021-09-14 ASSESSMENT — ACTIVITIES OF DAILY LIVING (ADL)
DRESS: SCRUBS (BEHAVIORAL HEALTH);INDEPENDENT
HYGIENE/GROOMING: INDEPENDENT
ORAL_HYGIENE: PROMPTS
HYGIENE/GROOMING: INDEPENDENT;PROMPTS
DRESS: INDEPENDENT
ORAL_HYGIENE: INDEPENDENT
LAUNDRY: WITH SUPERVISION
LAUNDRY: UNABLE TO COMPLETE

## 2021-09-14 NOTE — PLAN OF CARE
"Pt spent his evening resting or watching t.v. He continues to talk with himself, often out loud, but was not irritable or agitated this evening.     Pt received his pizza this evening and was happy to get it. Said he felt like he \"ate too much but it was good\"    Denies SI. Says yes to anxiety and depression.     No concerns identified.   "

## 2021-09-14 NOTE — PLAN OF CARE
Problem: Psychotic Symptoms  Goal: Psychotic Symptoms  Description: Signs and symptoms of listed problems will be absent or manageable.  Outcome: No Change       Pt appears responding to internal stimuli, continues to talk to himself.  Pt endorses auditory hallucination.  Pt was med compliant, no medication side effects noted or reported.  No outbursts this shift.  Will continue to assess.

## 2021-09-14 NOTE — PLAN OF CARE
Assessment/Intervention/Current Symtoms and Care Coordination  -Chart review  -Pre round meeting with team  -Rounded with team, addressed patient needs/concerns  -Post round meeting with team  Current Symptoms include the following: Psychosis      Provider is not here today so will move the team note to tomorrow.      Discharge Plan or Goal  Pending stabilization & development of a safe discharge plan.  Considerations include: memory care nursing home    Barriers to Discharge  No guardian has been found for the pt.  Nursing homes are requiring that the pt have a guardian.    Referral Status  memory care nursing home    Legal Status  Patient is under MI commitment in [facility]

## 2021-09-15 PROCEDURE — 250N000013 HC RX MED GY IP 250 OP 250 PS 637: Performed by: PSYCHIATRY & NEUROLOGY

## 2021-09-15 PROCEDURE — 124N000002 HC R&B MH UMMC

## 2021-09-15 PROCEDURE — 250N000013 HC RX MED GY IP 250 OP 250 PS 637: Performed by: PHYSICIAN ASSISTANT

## 2021-09-15 PROCEDURE — 99232 SBSQ HOSP IP/OBS MODERATE 35: CPT | Performed by: PSYCHIATRY & NEUROLOGY

## 2021-09-15 RX ADMIN — ASPIRIN 81 MG CHEWABLE TABLET 81 MG: 81 TABLET CHEWABLE at 08:43

## 2021-09-15 RX ADMIN — DOCUSATE SODIUM 100 MG: 100 CAPSULE, LIQUID FILLED ORAL at 08:43

## 2021-09-15 RX ADMIN — MEMANTINE 10 MG: 5 TABLET ORAL at 20:27

## 2021-09-15 RX ADMIN — METOPROLOL TARTRATE 25 MG: 25 TABLET, FILM COATED ORAL at 08:43

## 2021-09-15 RX ADMIN — BENZTROPINE MESYLATE 1 MG: 1 TABLET ORAL at 20:26

## 2021-09-15 RX ADMIN — LEVOTHYROXINE SODIUM 88 MCG: 88 TABLET ORAL at 08:43

## 2021-09-15 RX ADMIN — SALMETEROL XINAFOATE 1 PUFF: 50 POWDER, METERED ORAL; RESPIRATORY (INHALATION) at 20:27

## 2021-09-15 RX ADMIN — BENZTROPINE MESYLATE 1 MG: 1 TABLET ORAL at 08:43

## 2021-09-15 RX ADMIN — GUAIFENESIN 600 MG: 600 TABLET, EXTENDED RELEASE ORAL at 20:26

## 2021-09-15 RX ADMIN — Medication 50 MCG: at 08:43

## 2021-09-15 RX ADMIN — POLYETHYLENE GLYCOL 3350 17 G: 17 POWDER, FOR SOLUTION ORAL at 08:43

## 2021-09-15 RX ADMIN — MEMANTINE 10 MG: 5 TABLET ORAL at 08:43

## 2021-09-15 RX ADMIN — RIVASTIGMINE 1 PATCH: 4.6 PATCH TRANSDERMAL at 08:43

## 2021-09-15 RX ADMIN — METOPROLOL TARTRATE 25 MG: 25 TABLET, FILM COATED ORAL at 20:26

## 2021-09-15 RX ADMIN — GUAIFENESIN 600 MG: 600 TABLET, EXTENDED RELEASE ORAL at 08:43

## 2021-09-15 RX ADMIN — CLOZAPINE 300 MG: 100 TABLET ORAL at 20:26

## 2021-09-15 RX ADMIN — DOCUSATE SODIUM 100 MG: 100 CAPSULE, LIQUID FILLED ORAL at 20:26

## 2021-09-15 RX ADMIN — SALMETEROL XINAFOATE 1 PUFF: 50 POWDER, METERED ORAL; RESPIRATORY (INHALATION) at 08:44

## 2021-09-15 RX ADMIN — ATORVASTATIN CALCIUM 80 MG: 80 TABLET, FILM COATED ORAL at 20:26

## 2021-09-15 ASSESSMENT — ACTIVITIES OF DAILY LIVING (ADL)
ORAL_HYGIENE: INDEPENDENT
DRESS: SCRUBS (BEHAVIORAL HEALTH)
LAUNDRY: UNABLE TO COMPLETE
HYGIENE/GROOMING: PROMPTS

## 2021-09-15 NOTE — PLAN OF CARE
Assessment/Intervention/Current Symtoms and Care Coordination  -Chart review  -Pre round meeting with team  -Rounded with team, addressed patient needs/concerns  -Post round meeting with team  Current Symptoms include the following: Psychosis        I reached the [facility] worker live today when I called her. She said he has been on the list for a year. However he has never been a good fit for any openings that have come up. She said that he can be on more than one list.    I called  and they state that he is on the [facility] list but they have to place the Rule 20 client first so have not been able to place him.    I was informed that the TCM was  here on the unit to visit the pt today.      Discharge Plan or Goal  Pending stabilization & development of a safe discharge plan.  Considerations include: memory care    Barriers to Discharge  [facility] can not find a guardian for the pt.      Referral Status  guardianship and memory care    Legal Status  Patient is under MI commitment in [facility]

## 2021-09-15 NOTE — PLAN OF CARE
BEHAVIORAL TEAM DISCUSSION    Participants:   Dr. Dobbins,  Radha Donnelly RN, Coral Mckeon OT, pharmacy resident      Progress:   Pt has been in the care of [facility] for over a year now.  Pt does not exhibit problematic behaviors.  Pt does talk to himself and at times grunts and shout foul language.  The biggest concern is that the pt is incontinent of stool and urine and does not want to wear depends.  Nursing has to change linens and help pt into the shower.  Team is hoping that unit manager will agree to pay for weekly pizza as a reinforcement for wearing depends.    Outside  is trying to obtain guardianship for the pt.    Anticipated length of stay:   3 more months      Continued Stay Criteria/Rationale:   The pt needs a memory care unit to be discharged to and this is not available.      Medical/Physical:   Has tardive dyskinesia  Gets weekly blood draws as apar tof Clozarill management    Precautions:   Behavioral Orders   Procedures    Assault precautions    Code 1    Elopement precautions    Routine Programming     As clinically indicated    Single Room    Status 15     Every 15 minutes.     Plan:   Continue to look for guardian for the patient  Attempt to arrange for weekly pizza as a behavioral reinforcement      Rationale for change in precautions or plan: *no changes

## 2021-09-15 NOTE — PLAN OF CARE
Problem: Psychotic Symptoms  Goal: Psychotic Symptoms  Description: Signs and symptoms of listed problems will be absent or manageable.  Outcome: No Change     Pt has been visible in the milieu, continues to talk to himself, appears responding to some internal stimuli.  Pt denies all mental health symptoms but endorses auditory hallucinations.  Pt was incontinent of urine this morning, linen changed and pt prompted to take a shower which he completed.  Pt was med compliant.  No behavioral concerns.  No outbursts this shift.  Will continue to assess.

## 2021-09-15 NOTE — PLAN OF CARE
Problem: Sleep Disturbance  Goal: Adequate Sleep/Rest  Outcome: No Change    John slept approximately7 hours throughout the night without difficulty.  Will continue to encourage to wear briefs at bedtime.  Continue to offer support and encouragement.

## 2021-09-15 NOTE — PROGRESS NOTES
"[facility], [place]   Psychiatric Progress Note  Hospital Day: 77        Interim History:   The patient's care was discussed with the treatment team during the daily team meeting and/or staff's chart notes were reviewed.  Staff report patient has been visible in the milieu, taking medication as prescribed, observed responding to internal stimuli, oriented only the self, incontinent overnight, did not wear briefs, was cooperative with taking shower this AM, no acute events overnight.     Upon interview, patient was out in milieu watching TV, he agreed to meet in his room with writer and pharmacy resident. He reports feeling tired today, unsure if it is from the medications. He does feel that the people coming into his head are causing him to feel tired and endorsed voices. He did not elaborate on what they were saying, stated it was not mean/scary things. He appeared responding during interview. He also continues to have TD movements, denied noticing them or any discomfort. He enjoyed his pizza and reviewed that if he is adherent with wearing incontinence briefs team will work for him to have pizza as incentive. He then asked when he will be discharged and that he has been here \"longer than 6 months\". Continued to be oriented only to self and believes he is at [facility]. He states his sister has money to pay for him for a place to live and reviewed team is working on disposition. No additional concerns.          Medications:       aspirin  81 mg Oral Daily     atorvastatin  80 mg Oral At Bedtime     benztropine  1 mg Oral BID     cholecalciferol  1,250 mcg Oral Q7 Days     cloZAPine  300 mg Oral At Bedtime     docusate sodium  100 mg Oral BID     guaiFENesin  600 mg Oral BID     levothyroxine  88 mcg Oral Daily     memantine  10 mg Oral BID     metoprolol tartrate  25 mg Oral BID     polyethylene glycol  17 g Oral Daily     rivastigmine  1 patch Transdermal Daily     rivastigmine   " Transdermal Q8H     salmeterol  1 puff Inhalation BID     vitamin D3  50 mcg Oral Daily          Allergies:     Allergies   Allergen Reactions     Ibuprofen      Latex           Labs:     Recent Results (from the past 48 hour(s))   Asymptomatic COVID-19 Virus (Coronavirus) by PCR Lung, Left    Collection Time: 09/13/21  1:30 PM    Specimen: Lung, Left; Swab   Result Value Ref Range    SARS CoV2 PCR Negative Negative          Psychiatric Examination:     /77 (BP Location: Left arm)   Pulse 94   Temp 98.4  F (36.9  C) (Oral)   Resp 16   Ht 1.829 m (6')   Wt 90 kg (198 lb 6.4 oz)   SpO2 96%   BMI 26.91 kg/m    Weight is 198 lbs 6.4 oz  Body mass index is 26.91 kg/m .    Orthostatic Vitals       Most Recent      Lying Orthostatic /70 09/14 0907    Lying Orthostatic Pulse (bpm) 98 09/14 0907        Appearance: awake, alert and untidy, did just take shower  Attitude: somewhat cooperative  Eye Contact:  fair   Mood:  irritable  Affect:  mood congruent and intensity is flat  Speech:  soft volume, raspy ,dysarthric   Language: fluent and intact in English  Psychomotor, Gait, Musculoskeletal:  no evidence of dystonia, or tics, continues to have movements of buccolingual dyskinesia present during interview  Thought Process: disorganized  Associations:  no loose associations  Thought Content:  no evidence of suicidal ideation, patient made no HI statements today; continues to be delusional and observed responding to internal stimuli during interview  Insight:  limited  Judgement:  limited  Oriented to:  person  Attention Span and Concentration:  limited  Recent and Remote Memory:  limited  Fund of Knowledge:  delayed    Clinical Global Impressions  First:  Considering your total clinical experience with this particular patient population, how severe are the patient's symptoms at this time?: 7 (07/01/21 0630)  Compared to the patient's condition at the START of treatment, this patient's condition is: 4  (07/01/21 0630)  Most recent:  Considering your total clinical experience with this particular patient population, how severe are the patient's symptoms at this time?: 6 (09/08/21 0551)  Compared to the patient's condition at the START of treatment, this patient's condition is: 3 (09/08/21 0551)           Precautions:     Behavioral Orders   Procedures     Assault precautions     Code 1     Elopement precautions     Routine Programming     As clinically indicated     Single Room     Status 15     Every 15 minutes.          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   Stimulant use disorder, in remission.   Transaminitis, possibly medication induced   Hx of CVA  Vit D deficiency  Hypertension  COPD  Hx of infective endocarditis with severe mitral and aortic regurgitation s/p biprostehtic valve replacement 2015  Hx of HFrEF now recovered  Tobacco use disorder  Non-severe malnutrition   Urinary incontinence         Assessment & Plan:   Assessment and hospital summary:  The patient is a 54yo male with a history of schizophrenia and TBI and multiple medical co-morbidities as above who was admitted after being transferred from [facility] after a nearly year-long stay. Is currently under commitment with [facility] recently amended to include Clozaril. While at [facility] was noted to have ongoing agitation and frequently attempting to elope from unit requiring 1:1 staffing for safety. He was started on 1:1 staff upon transfer, this was discontinued as patient has been more cooperative without elopement attempts. IM consult completed on admission and continued to follow due to elevated LFTs. Haldol and VPA discontinued due to LFTs. Cross titration completed from risperidone to clozaril after Yanez amended. EPSE/TD movements noted, have  appeared stable over past few weeks. Patient has continued to have disorganization and active psychosis symptoms which appear to be at patients baseline. Team continues to work on disposition which barriers include patient has no guardian or next of kin willing to act as surrogate decision maker, see CTC notes for complete details.     Psychiatric treatment/inteventions:  Medications:   -continue Clozaril 300mg at bedtime for psychosis and agitation. Will not adjust dose further at this time, movements consistent with TD noted, and level in therapeutic range   -continue benztropine 1mg BID for EPSE  -continue memantine 5mg daily for neurocognitive disorder  -continue risperdal 1mg Q6H PRN agitation   -continue lorazepam 0.5-1.0mg Q4H PRN anxiety or severe agitation  -continue rivastigmine patch started 8/2 to target neurocognitive disorder     Laboratory/Imaging: weekly WBC and ANC for clozapine monitoring continues, initially declined labs, agreeable to draw later, ANC 9/10 was 4.2, next ANC 9/17; weekly covid ordered     Patient will be treated in therapeutic milieu with appropriate individual and group therapies as described.     Medical treatment/interventions:  Medical concerns:   1) IM consult placed on admission, per consult note on 7/1/21:   Diagnoses:    #Major neurocognitive disorder dt hx of TBI and alcohol use disorder  #Schizophrenia  #Under commitment  Had been residing in group home prior to admission.  Brought to hospital for evaluation of aggressive behaviors. Group home now unwilling to take him back. Has had numerous CODE 21s called this stay. Seen by psych, meds adjusted. Is currently under civil commitment and court hold through [facility] until 7/31/21. Please see recent discharge summary for details on 6/30/20201.   -Management per psychiatry      #Vitamin D Deficiency- Vit D level 16. Has been in hospital since Sept 2020. Started on cholecalciferol 04161 units on 6/17. Continue high-dose  weekly replacement for 6 weeks total. Following high dose replacement recommend starting vitamin D 2000 international unit(s) daily replacement (start date 8/5/2021).      #Possible Tardive Dyskinesia vs EPSE- Per psych assessment on 4/1/21, noted to have possible tardive dyskinesia vs extrapyramidal side effects of meds.  - At that time, recommended to increase Cogentin to 1mg BID and add vitamin E 800 international unit(s) daily.      #Hx of CVA - hx of basal ganglia stroke in 2015. No focal neuro deficits aside from cognitive dysfunction as above.      #Hyperlipidemia - Continue PTA ASA 81 mg daily and atorvastatin 80 mg daily.      #COPD - Not O2 dependent. Continue PTA on salmeterol and albuterol PRN. Patient occasionally refusing inhalers, encourage compliance. Mucinex added for intermittent cough at [facility].      #Hypothyroidism- TSH 3.18 on 9/2020. Repeat recently on 5/29/2021 slightly elevated 5.18, with no T4 . Continue PTA levothyroxine 88 mcg daily. Repeat TSH with reflex to T4.      #Hx of infective endocarditis with severe mitral and aortic regurgitation S/P bioprosthetic valve replacement (10/2015)  #Hx of HFrEF, now recovered, not in acute exacerbation  Follows with Dr. Dockery of [facility] V[facility] Cardiology, last seen in 1/2020. Echocardiogram in 5/2016 with EF 50%, no evidence for prosthetic mitral and aortic valve dysfunction.   - Follow-up with outpatient Cardiology upon dismissal from the hospital (on every 2 year follow-ups).  - Continue ASA 81 mg daily      #Tobacco use disorder- Using nicotine patch and PRN gum.     #Non-severe malnutrition- Nutrition consulted and following at OSH.      Ordered CMP, CBC, and TSH with reflex.  No further recommendations at this time. Please page the on-call SHREE for any intercurrent medical issues which arise.      ADDENDUM: TSH normal. CBC normal. ALT elevated at 174, with unsatisfactory AST. Per chart review, LFTs last checked in December with  and  . T bili and alk phos.  Reviewed with pharmacy, and possible medications causing would be Haldol, depakote, and statin. Discussed with psych who will taper haldol and Depakote. Holding statin, will check LDL.  Will repeat LFTs in AM, and check CK. Medicine will follow up on repeat LFTs, and CK.      Edith Alfaro PA-C  Hospitalist Service     2) Per updated progress note by IM 7/4:   A/P:  #Transaminitis - slowly rising LFTs since December. Asymptomatic. Reviewed with pharmacy, and possible medications causing would be Haldol, depakote, and statin. Discussed with psych who will taper haldol and Depakote. Holding statin, (total cholesterol 91, with LDL 41). Repeat LFTs continue to rise slightly.   -Abdominal US tomorrow (NPO after midnight)  -Hepatitis B surface ab and agn and Hep C ab   -Repeat LFTs in 3 days     Medicine will follow up on Abdominal US, viral hepatitis studies and repeat LFTs.  No further recommendations at this time. Please page the on-call SHREE for any intercurrent medical issues which arise.      Edith Alfaro PA-C  Hospitalist Service     Per progress note 7/12:      Brief Medicine Note     Medicine following peripherally for LFT monitoring.  LFTs today improved:  (182) and ALT 92 (129). T bili and ALP WNL.      Today's vital signs, medications, and nursing notes were reviewed.       /77 (BP Location: Left arm)   Pulse 110   Temp 97.8  F (36.6  C) (Oral)   Resp 16   Ht 1.829 m (6')   Wt 87.5 kg (192 lb 14.4 oz)   SpO2 95%   BMI 26.16 kg/m       Continue current plan of care. Will continue to trend CMP In 3 days to ensure continued downtrend.      Kim Harman PA-C  Hospitalist Service        3) Urinary incontinence and stool incontinence: RN staff first reported urinary incontinence over weekend 7/9, per chart review patient has history of intermittent urinary incontinence going back to at least 2016, continues to have intermittent episodes of incontinence,  continues to have both urinary and stool incontinence, placed IM consult for further assessment of need for additional work up given pts limitations as historian, appreciate assistance, no abnormalities noted on evaluation including bladder scan. Patient declining to wear incontinence briefs most days, staff continuing to encourage and started behavioral plan 9/10 to provide patient with pizza 1x per week for cooperation with incontinence cares. Pt did not wear briefs evening of 9/14.     4) Unvaccinated for Covid-19, see interim history of note 8/19 by writer, attempted to discuss R/B/A of vaccination with patient, he did not demonstrate capacity, has no next of kin acting as surrogate decision maker, is awaiting guardianship, given patients age and medical co-morbidities the risks of receiving Covid vaccine are outweighed by the benefits, including patient is required to be vaccinated to be considered for discharge to some nursing home facilities which would be a more appropriate milieu and less restrictive environment for patient than locked inpatient psychiatric unit. Dr. Nickerson provided second opinion regarding administration of Covid vaccine on 8.20 and agrees with recommendation.   -Covid vaccine order placed 8/23, J&J vaccine given 8/24     This note was created by undersigned using a Dragon dictation system. All typing errors or contextual distortion are unintentional and software inherent.     Disposition Plan   Reason for ongoing admission: is unable to care for self due to severe psychosis or mariusz and neurocognitive disorder   Discharge location: D, likely locked NH facility , unable to progress on discharge planning due to guardianship not being in place, see CTC notes  Discharge Medications: not ordered  Follow-up Appointments: not scheduled  Legal Status: Full MI commitment and Yanez     Entered by: Jeanette Dobbins on 9/15/2021 at 7:31 AM

## 2021-09-15 NOTE — PLAN OF CARE
Patient has spent majority of the shift in the milieu. Patient denies suicidal ideation and self injurious thoughts. Denies auditory and visual hallucinations. Denies anxiety and depression. Ate dinner. Med compliant. Affect is flat and irritable.     Patient evaluation continues. Assessed mood,anxiety,thoughts and behavior.     Patient gradually progressing towards goals.    Patient is encouraged to participate in groups and assisted to develop healthy coping skills.     VS reviewed: /77 (BP Location: Left arm)   Pulse 94   Temp 98.4  F (36.9  C) (Oral)   Resp 16   Ht 1.829 m (6')   Wt 90 kg (198 lb 6.4 oz)   SpO2 96%   BMI 26.91 kg/m      Length of stay: 76    Refer to daily team meeting notes for individualized plan of care. Nursing will continue to assess.

## 2021-09-16 PROCEDURE — 250N000013 HC RX MED GY IP 250 OP 250 PS 637: Performed by: PHYSICIAN ASSISTANT

## 2021-09-16 PROCEDURE — 124N000002 HC R&B MH UMMC

## 2021-09-16 PROCEDURE — 99232 SBSQ HOSP IP/OBS MODERATE 35: CPT | Performed by: PSYCHIATRY & NEUROLOGY

## 2021-09-16 PROCEDURE — G0177 OPPS/PHP; TRAIN & EDUC SERV: HCPCS

## 2021-09-16 PROCEDURE — 250N000013 HC RX MED GY IP 250 OP 250 PS 637: Performed by: PSYCHIATRY & NEUROLOGY

## 2021-09-16 RX ADMIN — MEMANTINE 10 MG: 5 TABLET ORAL at 20:57

## 2021-09-16 RX ADMIN — BENZTROPINE MESYLATE 1 MG: 1 TABLET ORAL at 08:40

## 2021-09-16 RX ADMIN — DOCUSATE SODIUM 100 MG: 100 CAPSULE, LIQUID FILLED ORAL at 08:40

## 2021-09-16 RX ADMIN — METOPROLOL TARTRATE 25 MG: 25 TABLET, FILM COATED ORAL at 20:56

## 2021-09-16 RX ADMIN — DOCUSATE SODIUM 100 MG: 100 CAPSULE, LIQUID FILLED ORAL at 20:57

## 2021-09-16 RX ADMIN — POLYETHYLENE GLYCOL 3350 17 G: 17 POWDER, FOR SOLUTION ORAL at 08:40

## 2021-09-16 RX ADMIN — GUAIFENESIN 600 MG: 600 TABLET, EXTENDED RELEASE ORAL at 20:56

## 2021-09-16 RX ADMIN — SALMETEROL XINAFOATE 1 PUFF: 50 POWDER, METERED ORAL; RESPIRATORY (INHALATION) at 20:56

## 2021-09-16 RX ADMIN — SALMETEROL XINAFOATE 1 PUFF: 50 POWDER, METERED ORAL; RESPIRATORY (INHALATION) at 08:40

## 2021-09-16 RX ADMIN — ASPIRIN 81 MG CHEWABLE TABLET 81 MG: 81 TABLET CHEWABLE at 08:41

## 2021-09-16 RX ADMIN — MEMANTINE 10 MG: 5 TABLET ORAL at 08:40

## 2021-09-16 RX ADMIN — GUAIFENESIN 600 MG: 600 TABLET, EXTENDED RELEASE ORAL at 08:40

## 2021-09-16 RX ADMIN — METOPROLOL TARTRATE 25 MG: 25 TABLET, FILM COATED ORAL at 08:41

## 2021-09-16 RX ADMIN — CHOLECALCIFEROL CAP 1.25 MG (50000 UNIT) 1250 MCG: 1.25 CAP at 08:42

## 2021-09-16 RX ADMIN — RIVASTIGMINE 1 PATCH: 4.6 PATCH TRANSDERMAL at 08:40

## 2021-09-16 RX ADMIN — Medication 50 MCG: at 08:40

## 2021-09-16 RX ADMIN — ATORVASTATIN CALCIUM 80 MG: 80 TABLET, FILM COATED ORAL at 20:56

## 2021-09-16 RX ADMIN — CLOZAPINE 300 MG: 100 TABLET ORAL at 20:56

## 2021-09-16 RX ADMIN — BENZTROPINE MESYLATE 1 MG: 1 TABLET ORAL at 20:57

## 2021-09-16 RX ADMIN — LEVOTHYROXINE SODIUM 88 MCG: 88 TABLET ORAL at 08:40

## 2021-09-16 ASSESSMENT — ACTIVITIES OF DAILY LIVING (ADL)
DRESS: PROMPTS
HYGIENE/GROOMING: PROMPTS
ORAL_HYGIENE: PROMPTS
ORAL_HYGIENE: PROMPTS
LAUNDRY: WITH SUPERVISION
HYGIENE/GROOMING: PROMPTS
DRESS: INDEPENDENT
LAUNDRY: WITH SUPERVISION

## 2021-09-16 ASSESSMENT — MIFFLIN-ST. JEOR: SCORE: 1753.86

## 2021-09-16 NOTE — PLAN OF CARE
Problem: OT General Care Plan  Goal: OT Goal 1  Description: OT Goal 1  Will attend OT groups and participate actively in all OT opportunities. Will assess and set goals.    Pt attended 1 out of 3 OT groups offered. Pt participated in occupational therapy group with 7 patients total x45 minutes. Focus of group activity was a cognitive game to think of word associations and try to match other players in the game. Patient was slightly late to group being led in by psych associate. He was able to follow the directions for the activity with 1 repeat and initially was saying the word he was choosing out loud and then was able to write it down. He answered when called upon to state his word he had chosen. He did match a few of the other patients with similar words and smiled on occasion when this happened. His words were appropriate and creative. He did not leave the group early, which he has done in the past.

## 2021-09-16 NOTE — PLAN OF CARE
Problem: Behavioral Health Plan of Care  Goal: Plan of Care Review  Recent Flowsheet Documentation  Taken 9/16/2021 1100 by Lynn Singh RN  Plan of Care Reviewed With: patient  Patient Agreement with Plan of Care: agrees     Patient request to sleep in this am and have his breakfast tray brought to him.  Patient's breakfast and am meds are brought to the patients room.  Patient said thank you.  During medication administration, the patient is asked to sit up in bed.  Patient grumbled and said a curse word, quietly.  Patient accepted his medications.      Patient is assessed for suicidal ideation and mental health symptoms.  Patient has a flat and blunted affect.  Mood is calm and cooperative.  Speech is incoherent.  Unable to assess thoughts, as patient does not engage in a conversation.  Assessment for SI, SIB, and HI is done by observation.  Patient has shown no unsafe behaviors.  In the afternoon, the patient is observed in the lounge.  He does not engage with peers, but likes to sit in the lounge with his peers.  Continue to monitor.

## 2021-09-16 NOTE — PLAN OF CARE
Assessment/Intervention/Current Symtoms and Care Coordination  -Chart review  -Pre round meeting with team  -Rounded with team, addressed patient needs/concerns  -Post round meeting with team  Current Symptoms include the following: Psychosis    Discharge Plan or Goal  Pending stabilization & development of a safe discharge plan.  Considerations include: memory care    Barriers to Discharge  Patient requires further psychiatric stabilization due to current symptomology    Referral Status  guardians and memory care needed    Legal Status  Patient is under MI commitment in [facility]

## 2021-09-16 NOTE — PLAN OF CARE
"Problem: Psychotic Symptoms  Goal: Psychotic Symptoms  Description: Signs and symptoms of listed problems will be absent or manageable.  Outcome: No Change     John spent his time in the TV lounge and in his room. He presents as calm tonight. He responds to internal stimuli, laughs and talks to himself, and thinks he is in [place]. He endorses hearing voices, which are \"okay\" and always there. He has oral TD sx, per baseline, which he does not notice. He stares off into space and does not make eye contact. He reports doing \"real good\" and actually did not make any delusional statements tonight. He remains behaviorally in contorl.    Pt continues to have NOC incontinence. Writer reminded him that he can order pizza next week if he wears briefs. W administered briefs and prompted/observed pt put them on; he states he will continue to do this \"for pizza.\" Staff should continue to remind him of this and bring briefs into his room to help him put them on.  "

## 2021-09-16 NOTE — PLAN OF CARE
Pt slept most of the evening. He was up twice for the bathroom and also came to the lounge for water. Respirations even and unlabored. Loose cough noted when up to the bathroom.    Sleep time: 6 hours

## 2021-09-16 NOTE — PROGRESS NOTES
"[facility], [place]   Psychiatric Progress Note  Hospital Day: 78        Interim History:   The patient's care was discussed with the treatment team during the daily team meeting and/or staff's chart notes were reviewed.  Staff report patient was visible in the milieu, reporting AH, oral TD movements still noted, did not make any delusional statements, was behaviorally controlled, taking medications as prescribed, did wear briefs with reminder of behavioral plan, no acute events overnight.     Prior to interview patient was sitting in lounge in TV area by himself, TV was not on and he was observed talking to self quietly and laughing. It was difficult to get his attention and he appeared to be responding to internal stimuli. He reported mood is \"good\", he endorses AH, states they are unchanged from previous. Tolerating medications, denies noted side effects, continues to deny noticing TD movements. He was reminded of behavioral plan for weekly pizza if wearing incontinence briefs and continues to be agreeable to this. Denies SI or HI, denies VH. No additional concerns.          Medications:       aspirin  81 mg Oral Daily     atorvastatin  80 mg Oral At Bedtime     benztropine  1 mg Oral BID     cholecalciferol  1,250 mcg Oral Q7 Days     cloZAPine  300 mg Oral At Bedtime     docusate sodium  100 mg Oral BID     guaiFENesin  600 mg Oral BID     levothyroxine  88 mcg Oral Daily     memantine  10 mg Oral BID     metoprolol tartrate  25 mg Oral BID     polyethylene glycol  17 g Oral Daily     rivastigmine  1 patch Transdermal Daily     rivastigmine   Transdermal Q8H     salmeterol  1 puff Inhalation BID     vitamin D3  50 mcg Oral Daily          Allergies:     Allergies   Allergen Reactions     Ibuprofen      Latex           Labs:     No results found for this or any previous visit (from the past 48 hour(s)).       Psychiatric Examination:     BP (!) 135/94   Pulse 99   Temp 97.3  F (36.3 " " C) (Oral)   Resp 16   Ht 1.829 m (6')   Wt 90 kg (198 lb 6.4 oz)   SpO2 98%   BMI 26.91 kg/m    Weight is 198 lbs 6.4 oz  Body mass index is 26.91 kg/m .    Orthostatic Vitals       Most Recent      Sitting Orthostatic /91 09/16 0816    Sitting Orthostatic Pulse (bpm) 104 09/16 0816    Standing Orthostatic /88 09/16 0816    Standing Orthostatic Pulse (bpm) 108 09/16 0816        Appearance: awake, alert and untidy, did just take shower  Attitude: somewhat cooperative  Eye Contact:  fair   Mood:  \"good\" continues to have periods of irritability   Affect:  mood congruent and intensity is flat  Speech:  soft volume, raspy ,dysarthric   Language: fluent and intact in English  Psychomotor, Gait, Musculoskeletal:  no evidence of dystonia, or tics, continues to have movements of buccolingual dyskinesia present during interview  Thought Process: disorganized  Associations:  no loose associations  Thought Content:  denies SI and HI; continues to be delusional and observed responding to internal stimuli during interview, endorses AH, denies VH  Insight:  limited  Judgement:  limited  Oriented to:  person  Attention Span and Concentration:  limited  Recent and Remote Memory:  limited  Fund of Knowledge:  delayed    Clinical Global Impressions  First:  Considering your total clinical experience with this particular patient population, how severe are the patient's symptoms at this time?: 7 (07/01/21 0630)  Compared to the patient's condition at the START of treatment, this patient's condition is: 4 (07/01/21 0630)  Most recent:  Considering your total clinical experience with this particular patient population, how severe are the patient's symptoms at this time?: 6 (09/08/21 0551)  Compared to the patient's condition at the START of treatment, this patient's condition is: 3 (09/08/21 0551)           Precautions:     Behavioral Orders   Procedures     Assault precautions     Code 1     Elopement precautions     " Routine Programming     As clinically indicated     Single Room     Status 15     Every 15 minutes.          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   Stimulant use disorder, in remission.   Transaminitis, possibly medication induced   Hx of CVA  Vit D deficiency  Hypertension  COPD  Hx of infective endocarditis with severe mitral and aortic regurgitation s/p biprostehtic valve replacement 2015  Hx of HFrEF now recovered  Tobacco use disorder  Non-severe malnutrition   Urinary incontinence         Assessment & Plan:   Assessment and hospital summary:  The patient is a 54yo male with a history of schizophrenia and TBI and multiple medical co-morbidities as above who was admitted after being transferred from [facility] after a nearly year-long stay. Is currently under commitment with Yanez recently amended to include Clozaril. While at [facility] was noted to have ongoing agitation and frequently attempting to elope from unit requiring 1:1 staffing for safety. He was started on 1:1 staff upon transfer, this was discontinued as patient has been more cooperative without elopement attempts. IM consult completed on admission and continued to follow due to elevated LFTs. Haldol and VPA discontinued due to LFTs. Cross titration completed from risperidone to clozaril after Yanez amended. EPSE/TD movements noted, have appeared stable over past few weeks. Patient has continued to have disorganization and active psychosis symptoms which appear to be at patients baseline. Team continues to work on disposition which barriers include patient has no guardian or next of kin willing to act as surrogate decision maker, see CTC notes for complete details.     Psychiatric treatment/inteventions:  Medications:   -continue Clozaril 300mg at  bedtime for psychosis and agitation. Will not adjust dose further at this time, movements consistent with TD noted, and level in therapeutic range   -continue benztropine 1mg BID for EPSE  -continue memantine 5mg daily for neurocognitive disorder  -continue risperdal 1mg Q6H PRN agitation   -continue lorazepam 0.5-1.0mg Q4H PRN anxiety or severe agitation  -continue rivastigmine patch started 8/2 to target neurocognitive disorder     Laboratory/Imaging: weekly WBC and ANC for clozapine monitoring continues, initially declined labs, agreeable to draw later, ANC 9/10 was 4.2, next ANC 9/17; weekly covid negative on 9/13     Patient will be treated in therapeutic milieu with appropriate individual and group therapies as described.     Medical treatment/interventions:  Medical concerns:   1) IM consult placed on admission, per consult note on 7/1/21:   Diagnoses:    #Major neurocognitive disorder dt hx of TBI and alcohol use disorder  #Schizophrenia  #Under commitment  Had been residing in group home prior to admission.  Brought to hospital for evaluation of aggressive behaviors. Group home now unwilling to take him back. Has had numerous CODE 21s called this stay. Seen by psych, meds adjusted. Is currently under civil commitment and court hold through [facility] until 7/31/21. Please see recent discharge summary for details on 6/30/20201.   -Management per psychiatry      #Vitamin D Deficiency- Vit D level 16. Has been in hospital since Sept 2020. Started on cholecalciferol 92443 units on 6/17. Continue high-dose weekly replacement for 6 weeks total. Following high dose replacement recommend starting vitamin D 2000 international unit(s) daily replacement (start date 8/5/2021).      #Possible Tardive Dyskinesia vs EPSE- Per psych assessment on 4/1/21, noted to have possible tardive dyskinesia vs extrapyramidal side effects of meds.  - At that time, recommended to increase Cogentin to 1mg BID and add vitamin E 800  international unit(s) daily.      #Hx of CVA - hx of basal ganglia stroke in 2015. No focal neuro deficits aside from cognitive dysfunction as above.      #Hyperlipidemia - Continue PTA ASA 81 mg daily and atorvastatin 80 mg daily.      #COPD - Not O2 dependent. Continue PTA on salmeterol and albuterol PRN. Patient occasionally refusing inhalers, encourage compliance. Mucinex added for intermittent cough at [facility].      #Hypothyroidism- TSH 3.18 on 9/2020. Repeat recently on 5/29/2021 slightly elevated 5.18, with no T4 . Continue PTA levothyroxine 88 mcg daily. Repeat TSH with reflex to T4.      #Hx of infective endocarditis with severe mitral and aortic regurgitation S/P bioprosthetic valve replacement (10/2015)  #Hx of HFrEF, now recovered, not in acute exacerbation  Follows with Dr. Dockery of [facility], last seen in 1/2020. Echocardiogram in 5/2016 with EF 50%, no evidence for prosthetic mitral and aortic valve dysfunction.   - Follow-up with outpatient Cardiology upon dismissal from the hospital (on every 2 year follow-ups).  - Continue ASA 81 mg daily      #Tobacco use disorder- Using nicotine patch and PRN gum.     #Non-severe malnutrition- Nutrition consulted and following at OSH.      Ordered CMP, CBC, and TSH with reflex.  No further recommendations at this time. Please page the on-call SHREE for any intercurrent medical issues which arise.      ADDENDUM: TSH normal. CBC normal. ALT elevated at 174, with unsatisfactory AST. Per chart review, LFTs last checked in December with  and . T bili and alk phos.  Reviewed with pharmacy, and possible medications causing would be Haldol, depakote, and statin. Discussed with psych who will taper haldol and Depakote. Holding statin, will check LDL.  Will repeat LFTs in AM, and check CK. Medicine will follow up on repeat LFTs, and CK.      Edith Alfaro PA-C  Hospitalist Service     2) Per updated progress note by IM 7/4:    A/P:  #Transaminitis - slowly rising LFTs since December. Asymptomatic. Reviewed with pharmacy, and possible medications causing would be Haldol, depakote, and statin. Discussed with psych who will taper haldol and Depakote. Holding statin, (total cholesterol 91, with LDL 41). Repeat LFTs continue to rise slightly.   -Abdominal US tomorrow (NPO after midnight)  -Hepatitis B surface ab and agn and Hep C ab   -Repeat LFTs in 3 days     Medicine will follow up on Abdominal US, viral hepatitis studies and repeat LFTs.  No further recommendations at this time. Please page the on-call SHREE for any intercurrent medical issues which arise.      Edith Alfaro PA-C  Hospitalist Service     Per progress note 7/12:      Brief Medicine Note     Medicine following peripherally for LFT monitoring.  LFTs today improved:  (182) and ALT 92 (129). T bili and ALP WNL.      Today's vital signs, medications, and nursing notes were reviewed.       /77 (BP Location: Left arm)   Pulse 110   Temp 97.8  F (36.6  C) (Oral)   Resp 16   Ht 1.829 m (6')   Wt 87.5 kg (192 lb 14.4 oz)   SpO2 95%   BMI 26.16 kg/m       Continue current plan of care. Will continue to trend CMP In 3 days to ensure continued downtrend.      Kim aHrman PA-C  Hospitalist Service        3) Urinary incontinence and stool incontinence: RN staff first reported urinary incontinence over weekend 7/9, per chart review patient has history of intermittent urinary incontinence going back to at least 2016, continues to have intermittent episodes of incontinence, continues to have both urinary and stool incontinence, placed IM consult for further assessment of need for additional work up given pts limitations as historian, appreciate assistance, no abnormalities noted on evaluation including bladder scan. Patient declining to wear incontinence briefs most days, staff continuing to encourage and started behavioral plan 9/10 to provide patient with  lance 1x per week for cooperation with incontinence cares. Pt did not wear briefs evening of 9/14, more agreeable 9/15.     4) Unvaccinated for Covid-19, see interim history of note 8/19 by writer, attempted to discuss R/B/A of vaccination with patient, he did not demonstrate capacity, has no next of kin acting as surrogate decision maker, is awaiting guardianship, given patients age and medical co-morbidities the risks of receiving Covid vaccine are outweighed by the benefits, including patient is required to be vaccinated to be considered for discharge to some nursing home facilities which would be a more appropriate milieu and less restrictive environment for patient than locked inpatient psychiatric unit. Dr. Nickerson provided second opinion regarding administration of Covid vaccine on 8.20 and agrees with recommendation.   -Covid vaccine order placed 8/23, J&J vaccine given 8/24     This note was created by undersigned using a Dragon dictation system. All typing errors or contextual distortion are unintentional and software inherent.     Disposition Plan   Reason for ongoing admission: is unable to care for self due to severe psychosis or mariusz and neurocognitive disorder   Discharge location: TBD, likely locked NH facility , unable to progress on discharge planning due to guardianship not being in place, see CTC notes  Discharge Medications: not ordered  Follow-up Appointments: not scheduled  Legal Status: Full MI commitment and Yanez     Entered by: Jeanette Dobbins on 9/16/2021 at 7:18 AM

## 2021-09-17 LAB
BASOPHILS # BLD AUTO: 0.1 10E3/UL (ref 0–0.2)
BASOPHILS NFR BLD AUTO: 2 %
EOSINOPHIL # BLD AUTO: 1 10E3/UL (ref 0–0.7)
EOSINOPHIL NFR BLD AUTO: 16 %
ERYTHROCYTE [DISTWIDTH] IN BLOOD BY AUTOMATED COUNT: 13.2 % (ref 10–15)
HCT VFR BLD AUTO: 46.4 % (ref 40–53)
HGB BLD-MCNC: 16.4 G/DL (ref 13.3–17.7)
IMM GRANULOCYTES # BLD: 0 10E3/UL
IMM GRANULOCYTES NFR BLD: 0 %
LYMPHOCYTES # BLD AUTO: 1.5 10E3/UL (ref 0.8–5.3)
LYMPHOCYTES NFR BLD AUTO: 23 %
MCH RBC QN AUTO: 32.5 PG (ref 26.5–33)
MCHC RBC AUTO-ENTMCNC: 35.3 G/DL (ref 31.5–36.5)
MCV RBC AUTO: 92 FL (ref 78–100)
MONOCYTES # BLD AUTO: 0.5 10E3/UL (ref 0–1.3)
MONOCYTES NFR BLD AUTO: 8 %
NEUTROPHILS # BLD AUTO: 3.4 10E3/UL (ref 1.6–8.3)
NEUTROPHILS NFR BLD AUTO: 51 %
NRBC # BLD AUTO: 0 10E3/UL
NRBC BLD AUTO-RTO: 0 /100
PLATELET # BLD AUTO: 171 10E3/UL (ref 150–450)
RBC # BLD AUTO: 5.04 10E6/UL (ref 4.4–5.9)
TROPONIN I SERPL-MCNC: <0.015 UG/L (ref 0–0.04)
WBC # BLD AUTO: 6.4 10E3/UL (ref 4–11)

## 2021-09-17 PROCEDURE — 36415 COLL VENOUS BLD VENIPUNCTURE: CPT | Performed by: PHYSICIAN ASSISTANT

## 2021-09-17 PROCEDURE — 36416 COLLJ CAPILLARY BLOOD SPEC: CPT | Performed by: PSYCHIATRY & NEUROLOGY

## 2021-09-17 PROCEDURE — 99231 SBSQ HOSP IP/OBS SF/LOW 25: CPT | Performed by: PHYSICIAN ASSISTANT

## 2021-09-17 PROCEDURE — 250N000013 HC RX MED GY IP 250 OP 250 PS 637: Performed by: PSYCHIATRY & NEUROLOGY

## 2021-09-17 PROCEDURE — 85025 COMPLETE CBC W/AUTO DIFF WBC: CPT | Performed by: PSYCHIATRY & NEUROLOGY

## 2021-09-17 PROCEDURE — 99207 PR CDG-MDM COMPONENT: MEETS MODERATE - DOWN CODED: CPT | Performed by: PHYSICIAN ASSISTANT

## 2021-09-17 PROCEDURE — 99232 SBSQ HOSP IP/OBS MODERATE 35: CPT | Performed by: PSYCHIATRY & NEUROLOGY

## 2021-09-17 PROCEDURE — 84484 ASSAY OF TROPONIN QUANT: CPT | Performed by: PHYSICIAN ASSISTANT

## 2021-09-17 PROCEDURE — 250N000013 HC RX MED GY IP 250 OP 250 PS 637: Performed by: PHYSICIAN ASSISTANT

## 2021-09-17 PROCEDURE — 124N000002 HC R&B MH UMMC

## 2021-09-17 PROCEDURE — 93010 ELECTROCARDIOGRAM REPORT: CPT | Performed by: INTERNAL MEDICINE

## 2021-09-17 RX ADMIN — DOCUSATE SODIUM 100 MG: 100 CAPSULE, LIQUID FILLED ORAL at 22:24

## 2021-09-17 RX ADMIN — CLOZAPINE 300 MG: 100 TABLET ORAL at 22:23

## 2021-09-17 RX ADMIN — GUAIFENESIN 600 MG: 600 TABLET, EXTENDED RELEASE ORAL at 22:24

## 2021-09-17 RX ADMIN — Medication 50 MCG: at 08:15

## 2021-09-17 RX ADMIN — SALMETEROL XINAFOATE 1 PUFF: 50 POWDER, METERED ORAL; RESPIRATORY (INHALATION) at 22:25

## 2021-09-17 RX ADMIN — ASPIRIN 81 MG CHEWABLE TABLET 81 MG: 81 TABLET CHEWABLE at 08:15

## 2021-09-17 RX ADMIN — BENZTROPINE MESYLATE 1 MG: 1 TABLET ORAL at 22:24

## 2021-09-17 RX ADMIN — POLYETHYLENE GLYCOL 3350 17 G: 17 POWDER, FOR SOLUTION ORAL at 08:16

## 2021-09-17 RX ADMIN — METOPROLOL TARTRATE 25 MG: 25 TABLET, FILM COATED ORAL at 22:24

## 2021-09-17 RX ADMIN — GUAIFENESIN 600 MG: 600 TABLET, EXTENDED RELEASE ORAL at 08:15

## 2021-09-17 RX ADMIN — MEMANTINE 10 MG: 5 TABLET ORAL at 08:15

## 2021-09-17 RX ADMIN — DOCUSATE SODIUM 100 MG: 100 CAPSULE, LIQUID FILLED ORAL at 08:15

## 2021-09-17 RX ADMIN — METOPROLOL TARTRATE 25 MG: 25 TABLET, FILM COATED ORAL at 08:15

## 2021-09-17 RX ADMIN — ATORVASTATIN CALCIUM 80 MG: 80 TABLET, FILM COATED ORAL at 22:24

## 2021-09-17 RX ADMIN — MEMANTINE 10 MG: 5 TABLET ORAL at 22:24

## 2021-09-17 RX ADMIN — SALMETEROL XINAFOATE 1 PUFF: 50 POWDER, METERED ORAL; RESPIRATORY (INHALATION) at 08:25

## 2021-09-17 RX ADMIN — BENZTROPINE MESYLATE 1 MG: 1 TABLET ORAL at 08:15

## 2021-09-17 RX ADMIN — LEVOTHYROXINE SODIUM 88 MCG: 88 TABLET ORAL at 08:16

## 2021-09-17 RX ADMIN — RIVASTIGMINE 1 PATCH: 4.6 PATCH TRANSDERMAL at 08:16

## 2021-09-17 ASSESSMENT — ACTIVITIES OF DAILY LIVING (ADL)
LAUNDRY: UNABLE TO COMPLETE
ORAL_HYGIENE: INDEPENDENT
HYGIENE/GROOMING: INDEPENDENT;PROMPTS
DRESS: INDEPENDENT;PROMPTS

## 2021-09-17 NOTE — PLAN OF CARE
Problem: Psychotic Symptoms  Goal: Psychotic Symptoms  Description: Signs and symptoms of listed problems will be absent or manageable.  Outcome: No Change       Pt visible in the milieu.  Affect is flat blunted.  Pt was medication compliant.  Pt continues to talk to himself, appears responding to internal stimuli.  EKG completed today and pt was seen by internal medicine for complaints of chest pain.  Pt denies chest pain or any other discomfort.  Will continue to assess.

## 2021-09-17 NOTE — PLAN OF CARE
Pt was asleep at the start of the shift, respirations even and regular, occasional loose cough noted.     No concerns identified. Precautions maintained.     Sleep hours: 7

## 2021-09-17 NOTE — PLAN OF CARE
Assessment/Intervention/Current Symtoms and Care Coordination  -Chart review  -Pre round meeting with team  -Rounded with team, addressed patient needs/concerns  -Post round meeting with team  Current Symptoms include the following: Psychosis and confusion      Pt wore the Depends.    Discharge Plan or Goal  Pending stabilization & development of a safe discharge plan.  [facility]    Barriers to Discharge  Patient requires further psychiatric stabilization due to current symptomology    Referral Status  guardian and memory care unit    Legal Status  Patient is under MI commitment in [facility]

## 2021-09-17 NOTE — PLAN OF CARE
"Pt spent most of the evening in the lounge. He wanted to order a pizza this evening but was reminded that the unit would buy him a pizza next week if he continues to wear attends at .     About 1930 patient began yelling while in his room. When checked on he said he was yelling at a former boss and might have to call the police on him. He then sat in the lounge and was calm for a few minutes but began yelling again. This time he said the voices outside his head were too loud and it was upsetting him. He was offered PRN medication but he declined. He said \"I'm ok now.\" Pt did ask for a soda which was provided.   He has been sitting in the lounge since, occasionally talking to himself but no further yelling.     Pt reluctant to wear attends tonight but eventually agreed.    "

## 2021-09-17 NOTE — PROGRESS NOTES
"[facility], [place]   Psychiatric Progress Note  Hospital Day: 79        Interim History:   The patient's care was discussed with the treatment team during the daily team meeting and/or staff's chart notes were reviewed.  Staff report patient has been visible in the milieu but isolates to self, observed mumbling to self, did yell out overnight, reporting AH, declined PRN, did wear incontinence briefs with much encouragement, taking medications as prescribed, no acute events overnight.     Upon interview pt was lying in bed but awoke to voice, he reported that his \"heart and arms hurt\", he did clarified his chest hurt, he stated both arms had pain, he denied palpitations \"not anymore\", could not give more details as to what kind of pain and that it started \"today\" but did not give time. Denies nausea, vomiting, shortness of breath. He then stated he needs Vicodin for his pain. Offered EKG, he declined initially. He is tolerating medications, denies side effects. Mood \"okay\", continues to endorse AH, when asked about this he smiled in response, states they are \"the same\", denies VH. Denies noticing TD movements or any discomfort. Reminded of behavioral plan to order pizza Monday if he continues to wear briefs. No additional concerns.          Medications:       aspirin  81 mg Oral Daily     atorvastatin  80 mg Oral At Bedtime     benztropine  1 mg Oral BID     cholecalciferol  1,250 mcg Oral Q7 Days     cloZAPine  300 mg Oral At Bedtime     docusate sodium  100 mg Oral BID     guaiFENesin  600 mg Oral BID     levothyroxine  88 mcg Oral Daily     memantine  10 mg Oral BID     metoprolol tartrate  25 mg Oral BID     polyethylene glycol  17 g Oral Daily     rivastigmine  1 patch Transdermal Daily     rivastigmine   Transdermal Q8H     salmeterol  1 puff Inhalation BID     vitamin D3  50 mcg Oral Daily          Allergies:     Allergies   Allergen Reactions     Ibuprofen      Latex           " "Labs:     No results found for this or any previous visit (from the past 48 hour(s)).       Psychiatric Examination:     BP (!) 118/90   Pulse 91   Temp 98.1  F (36.7  C) (Oral)   Resp 16   Ht 1.829 m (6')   Wt 89.1 kg (196 lb 6.4 oz)   SpO2 100%   BMI 26.64 kg/m    Weight is 196 lbs 6.4 oz  Body mass index is 26.64 kg/m .    Orthostatic Vitals       Most Recent      Sitting Orthostatic /91 09/16 0816    Sitting Orthostatic Pulse (bpm) 104 09/16 0816    Standing Orthostatic /88 09/16 0816    Standing Orthostatic Pulse (bpm) 108 09/16 0816        Appearance: awake, alert and untidy, did just take shower  Attitude: somewhat cooperative  Eye Contact:  fair   Mood:  \"okay\" continues to have periods of irritability   Affect:  mood congruent and intensity is flat  Speech:  soft volume, raspy ,dysarthric   Language: fluent and intact in English  Psychomotor, Gait, Musculoskeletal:  no evidence of dystonia, or tics, continues to have movements of buccolingual dyskinesia present during interview  Thought Process: disorganized  Associations:  no loose associations  Thought Content:  denies SI and HI; continues to be delusional and observed responding to internal stimuli during interview, endorses AH, denies VH  Insight:  limited  Judgement:  limited  Oriented to:  person  Attention Span and Concentration:  limited  Recent and Remote Memory:  limited  Fund of Knowledge:  delayed    Clinical Global Impressions  First:  Considering your total clinical experience with this particular patient population, how severe are the patient's symptoms at this time?: 7 (07/01/21 0630)  Compared to the patient's condition at the START of treatment, this patient's condition is: 4 (07/01/21 0630)  Most recent:  Considering your total clinical experience with this particular patient population, how severe are the patient's symptoms at this time?: 6 (09/08/21 0551)  Compared to the patient's condition at the START of treatment, " this patient's condition is: 3 (09/08/21 0551)           Precautions:     Behavioral Orders   Procedures     Assault precautions     Code 1     Elopement precautions     Routine Programming     As clinically indicated     Single Room     Status 15     Every 15 minutes.          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   Stimulant use disorder, in remission.   Transaminitis, possibly medication induced   Hx of CVA  Vit D deficiency  Hypertension  COPD  Hx of infective endocarditis with severe mitral and aortic regurgitation s/p biprostehtic valve replacement 2015  Hx of HFrEF now recovered  Tobacco use disorder  Non-severe malnutrition   Urinary incontinence         Assessment & Plan:   Assessment and hospital summary:  The patient is a 56yo male with a history of schizophrenia and TBI and multiple medical co-morbidities as above who was admitted after being transferred from [facility] after a nearly year-long stay. Is currently under commitment with Yanez recently amended to include Clozaril. While at [facility] was noted to have ongoing agitation and frequently attempting to elope from unit requiring 1:1 staffing for safety. He was started on 1:1 staff upon transfer, this was discontinued as patient has been more cooperative without elopement attempts. IM consult completed on admission and continued to follow due to elevated LFTs. Haldol and VPA discontinued due to LFTs. Cross titration completed from risperidone to clozaril after Yanez amended. EPSE/TD movements noted, have appeared stable over past few weeks. Patient has continued to have disorganization and active psychosis symptoms which appear to be at patients baseline. Team continues to work on disposition which barriers include patient has no guardian or next of  kin willing to act as surrogate decision maker, see CTC notes for complete details.     Psychiatric treatment/inteventions:  Medications:   -continue Clozaril 300mg at bedtime for psychosis and agitation. Will not adjust dose further at this time, movements consistent with TD noted, and level in therapeutic range   -continue benztropine 1mg BID for EPSE  -continue memantine 5mg daily for neurocognitive disorder  -continue risperdal 1mg Q6H PRN agitation   -continue lorazepam 0.5-1.0mg Q4H PRN anxiety or severe agitation  -continue rivastigmine patch started 8/2 to target neurocognitive disorder     Laboratory/Imaging: weekly WBC and ANC for clozapine monitoring continues ANC today 3.4, clozapine level also added to draw; weekly covid negative on 9/13     Patient will be treated in therapeutic milieu with appropriate individual and group therapies as described.     Medical treatment/interventions:  Medical concerns:   1) IM consult placed on admission, per consult note on 7/1/21:   Diagnoses:    #Major neurocognitive disorder dt hx of TBI and alcohol use disorder  #Schizophrenia  #Under commitment  Had been residing in group home prior to admission.  Brought to hospital for evaluation of aggressive behaviors. Group home now unwilling to take him back. Has had numerous CODE 21s called this stay. Seen by psych, meds adjusted. Is currently under civil commitment and court hold through [facility] until 7/31/21. Please see recent discharge summary for details on 6/30/20201.   -Management per psychiatry      #Vitamin D Deficiency- Vit D level 16. Has been in hospital since Sept 2020. Started on cholecalciferol 07429 units on 6/17. Continue high-dose weekly replacement for 6 weeks total. Following high dose replacement recommend starting vitamin D 2000 international unit(s) daily replacement (start date 8/5/2021).      #Possible Tardive Dyskinesia vs EPSE- Per psych assessment on 4/1/21, noted to have possible tardive  dyskinesia vs extrapyramidal side effects of meds.  - At that time, recommended to increase Cogentin to 1mg BID and add vitamin E 800 international unit(s) daily.      #Hx of CVA - hx of basal ganglia stroke in 2015. No focal neuro deficits aside from cognitive dysfunction as above.      #Hyperlipidemia - Continue PTA ASA 81 mg daily and atorvastatin 80 mg daily.      #COPD - Not O2 dependent. Continue PTA on salmeterol and albuterol PRN. Patient occasionally refusing inhalers, encourage compliance. Mucinex added for intermittent cough at [facility].      #Hypothyroidism- TSH 3.18 on 9/2020. Repeat recently on 5/29/2021 slightly elevated 5.18, with no T4 . Continue PTA levothyroxine 88 mcg daily. Repeat TSH with reflex to T4.      #Hx of infective endocarditis with severe mitral and aortic regurgitation S/P bioprosthetic valve replacement (10/2015)  #Hx of HFrEF, now recovered, not in acute exacerbation  Follows with Dr. Dockery of [facility], last seen in 1/2020. Echocardiogram in 5/2016 with EF 50%, no evidence for prosthetic mitral and aortic valve dysfunction.   - Follow-up with outpatient Cardiology upon dismissal from the hospital (on every 2 year follow-ups).  - Continue ASA 81 mg daily      #Tobacco use disorder- Using nicotine patch and PRN gum.     #Non-severe malnutrition- Nutrition consulted and following at OSH.      Ordered CMP, CBC, and TSH with reflex.  No further recommendations at this time. Please page the on-call [facility] for any intercurrent medical issues which arise.      ADDENDUM: TSH normal. CBC normal. ALT elevated at 174, with unsatisfactory AST. Per chart review, LFTs last checked in December with  and . T bili and alk phos.  Reviewed with pharmacy, and possible medications causing would be Haldol, depakote, and statin. Discussed with psych who will taper haldol and Depakote. Holding statin, will check LDL.  Will repeat LFTs in AM, and check CK. Medicine will  follow up on repeat LFTs, and CK.      Edith Alfaro PA-C  Hospitalist Service     2) Per updated progress note by IM 7/4:   A/P:  #Transaminitis - slowly rising LFTs since December. Asymptomatic. Reviewed with pharmacy, and possible medications causing would be Haldol, depakote, and statin. Discussed with psych who will taper haldol and Depakote. Holding statin, (total cholesterol 91, with LDL 41). Repeat LFTs continue to rise slightly.   -Abdominal US tomorrow (NPO after midnight)  -Hepatitis B surface ab and agn and Hep C ab   -Repeat LFTs in 3 days     Medicine will follow up on Abdominal US, viral hepatitis studies and repeat LFTs.  No further recommendations at this time. Please page the on-call [facility] for any intercurrent medical issues which arise.      Edith Alfaro PA-C  Hospitalist Service     Per progress note 7/12:      Brief Medicine Note     Medicine following peripherally for LFT monitoring.  LFTs today improved:  (182) and ALT 92 (129). T bili and ALP WNL.      Today's vital signs, medications, and nursing notes were reviewed.       /77 (BP Location: Left arm)   Pulse 110   Temp 97.8  F (36.6  C) (Oral)   Resp 16   Ht 1.829 m (6')   Wt 87.5 kg (192 lb 14.4 oz)   SpO2 95%   BMI 26.16 kg/m       Continue current plan of care. Will continue to trend CMP In 3 days to ensure continued downtrend.      Kim Harman PA-C  Hospitalist Service        3) Urinary incontinence and stool incontinence: RN staff first reported urinary incontinence over weekend 7/9, per chart review patient has history of intermittent urinary incontinence going back to at least 2016, continues to have intermittent episodes of incontinence, continues to have both urinary and stool incontinence, placed IM consult for further assessment of need for additional work up given pts limitations as historian, appreciate assistance, no abnormalities noted on evaluation including bladder scan. Patient declining to  "wear incontinence briefs most days, staff continuing to encourage and started behavioral plan 9/10 to provide patient with pizza 1x per week for cooperation with incontinence cares. Pt did not wear briefs evening of 9/14, more agreeable 9/15.     4) Unvaccinated for Covid-19, see interim history of note 8/19 by writer, attempted to discuss R/B/A of vaccination with patient, he did not demonstrate capacity, has no next of kin acting as surrogate decision maker, is awaiting guardianship, given patients age and medical co-morbidities the risks of receiving Covid vaccine are outweighed by the benefits, including patient is required to be vaccinated to be considered for discharge to some nursing home facilities which would be a more appropriate milieu and less restrictive environment for patient than locked inpatient psychiatric unit. Dr. Nickerson provided second opinion regarding administration of Covid vaccine on 8.20 and agrees with recommendation.   -Covid vaccine order placed 8/23, J&J vaccine given 8/24    5) Pt reporting pain in \"heart and arms\", discussed with IM who will come evaluate patient given significant cardiac history, will order EKG    This note was created by undersigned using a Dragon dictation system. All typing errors or contextual distortion are unintentional and software inherent.     Disposition Plan   Reason for ongoing admission: is unable to care for self due to severe psychosis or mariusz and neurocognitive disorder   Discharge location: D, likely locked NH facility , unable to progress on discharge planning due to guardianship not being in place, see CTC notes  Discharge Medications: not ordered  Follow-up Appointments: not scheduled  Legal Status: Full MI commitment and Yanez     Entered by: Jeanetet Dobbins on 9/17/2021 at 7:08 AM              "

## 2021-09-17 NOTE — PROGRESS NOTES
"  [facility]  Daily Progress Note  John Juárez  [id number]  September 17, 2021     Interval History:   During primary provider interview, pt states \"my heart and arms hurt\". Given heart hx concern for acute or developing MI. Prior to  assessment by this writer, pt up ambulating in the milieu. At time of interview by this writer, pt denies any chest pain, palpitations or difficulty breathing. He also denied any arm pain. Complains of feeling very tired and states that he did not sleep well last night.    ROS:   Please see HPI, otherwise, complete 10 point review of systems is negative.      Physical Exam:   Vitals were reviewed  Blood pressure 103/72, pulse 97, temperature 98  F (36.7  C), temperature source Oral, resp. rate 16, height 1.829 m (6'), weight 89.1 kg (196 lb 6.4 oz), SpO2 98 %.  General:alert, NAD  Cardiovascular: RRR, S1S2, no murmurs appreciated  Lungs:CTAB, no wheezing or crackles  Abdomen: soft with no distention and no tenderness   Vascular: no peripheral edema  Neurologic: AAO X 3, CN 2-12 grossly intact, no focal deficits  Skin: no jaundice, rashes, or lesions on exposed surfaces     Assessment and Plan:   John Juárez is a 55 year old male with a hc of schizophrenia, hx of TBI, alcohol use diosrder, COPD, HLD, hx of cardiomyopathy that has since resolved (EF 50% 2016), hx of endocarditis s/p bioprosthetic valve replacement 10/2015, hx of CVA, and hypothyroidism who was transferred from [facility] on 6/30/21 for ongoing psychiatric care. Please see initial Consul date date 7/1/21 by my colleague Edith Alfaro, as well as subsequent medicine progress notes for extensive details on medical hx. IM was asked to see this patient in follow up today regarding new complaints of \"heart and arms\" hurting.     # Chest pain statement. Pt made statement of chest and arm discomfort to attending provider during interview. Denies on my visit. Heart RRR on exam. Non diaphoretic. " Ambulating in the milieu. EKG showing sinus rhythm and RBBB (unchanged from prior), no T-wave inversion or ST elevation to suggest developing MI.   Troponin <0.015.  - Unlikely developing ACS given resolution of symptoms, stable VS, unremarkable EKG and negative troponin  - CTM          Attestation:  I have reviewed today's vital signs, notes, medications, labs and imaging.        Lenora Cortes PA-C PA-C  Internal Medicine [facility] Hospitalist  [phone]  September 17, 2021

## 2021-09-18 PROCEDURE — 250N000013 HC RX MED GY IP 250 OP 250 PS 637: Performed by: PSYCHIATRY & NEUROLOGY

## 2021-09-18 PROCEDURE — 250N000013 HC RX MED GY IP 250 OP 250 PS 637: Performed by: PHYSICIAN ASSISTANT

## 2021-09-18 PROCEDURE — 124N000002 HC R&B MH UMMC

## 2021-09-18 RX ADMIN — Medication 50 MCG: at 08:56

## 2021-09-18 RX ADMIN — ASPIRIN 81 MG CHEWABLE TABLET 81 MG: 81 TABLET CHEWABLE at 08:56

## 2021-09-18 RX ADMIN — GUAIFENESIN 600 MG: 600 TABLET, EXTENDED RELEASE ORAL at 20:39

## 2021-09-18 RX ADMIN — DOCUSATE SODIUM 100 MG: 100 CAPSULE, LIQUID FILLED ORAL at 08:56

## 2021-09-18 RX ADMIN — GUAIFENESIN 600 MG: 600 TABLET, EXTENDED RELEASE ORAL at 08:56

## 2021-09-18 RX ADMIN — LEVOTHYROXINE SODIUM 88 MCG: 88 TABLET ORAL at 08:56

## 2021-09-18 RX ADMIN — BENZTROPINE MESYLATE 1 MG: 1 TABLET ORAL at 20:38

## 2021-09-18 RX ADMIN — BENZTROPINE MESYLATE 1 MG: 1 TABLET ORAL at 08:56

## 2021-09-18 RX ADMIN — SALMETEROL XINAFOATE 1 PUFF: 50 POWDER, METERED ORAL; RESPIRATORY (INHALATION) at 09:04

## 2021-09-18 RX ADMIN — CLOZAPINE 300 MG: 100 TABLET ORAL at 20:38

## 2021-09-18 RX ADMIN — SALMETEROL XINAFOATE 1 PUFF: 50 POWDER, METERED ORAL; RESPIRATORY (INHALATION) at 20:39

## 2021-09-18 RX ADMIN — POLYETHYLENE GLYCOL 3350 17 G: 17 POWDER, FOR SOLUTION ORAL at 08:56

## 2021-09-18 RX ADMIN — MEMANTINE 10 MG: 5 TABLET ORAL at 20:38

## 2021-09-18 RX ADMIN — METOPROLOL TARTRATE 25 MG: 25 TABLET, FILM COATED ORAL at 08:56

## 2021-09-18 RX ADMIN — METOPROLOL TARTRATE 25 MG: 25 TABLET, FILM COATED ORAL at 20:39

## 2021-09-18 RX ADMIN — DOCUSATE SODIUM 100 MG: 100 CAPSULE, LIQUID FILLED ORAL at 20:38

## 2021-09-18 RX ADMIN — ATORVASTATIN CALCIUM 80 MG: 80 TABLET, FILM COATED ORAL at 20:39

## 2021-09-18 RX ADMIN — MEMANTINE 10 MG: 5 TABLET ORAL at 08:56

## 2021-09-18 RX ADMIN — RIVASTIGMINE 1 PATCH: 4.6 PATCH TRANSDERMAL at 08:56

## 2021-09-18 NOTE — PLAN OF CARE
Patient has spent majority of the shift in the milieu. Denies suicidal ideation and self injurious thoughts. Denies auditory and visual hallucinations. Observed talking to herself. Denies anxiety and depression.Continues to be labile and occasionally is irritable but overall cooperative. Ate dinner. Med compliant.     Patient evaluation continues. Assessed mood,anxiety,thoughts and behavior.     Patient gradually progressing towards goals.    Patient is encouraged to participate in groups and assisted to develop healthy coping skills.     VS reviewed: /86 (BP Location: Left arm)   Pulse 86   Temp 97.5  F (36.4  C) (Oral)   Resp 16   Ht 1.829 m (6')   Wt 89.1 kg (196 lb 6.4 oz)   SpO2 98%   BMI 26.64 kg/m      Length of stay: 79    Refer to daily team meeting notes for individualized plan of care. Nursing will continue to assess.

## 2021-09-18 NOTE — PLAN OF CARE
Pt has slept through the night, waking once for the bathroom. Respirations were even and unlabored.   Precautions maintained and no concerns identified.     Sleep hours: 6.5

## 2021-09-18 NOTE — PLAN OF CARE
Problem: Psychotic Symptoms  Goal: Psychotic Symptoms  Description: Signs and symptoms of listed problems will be absent or manageable.  Outcome: No Change       Pt has been in and out of his room today.  Pt was out in the milieu watching tv, continues to talk to himself.  Affect is flat blunted, mood is calm.  No irritability noted this shift.  Good appetite.  Medication compliant.  Will continue to assess.

## 2021-09-19 LAB
ATRIAL RATE - MUSE: 83 BPM
DIASTOLIC BLOOD PRESSURE - MUSE: NORMAL MMHG
INTERPRETATION ECG - MUSE: NORMAL
P AXIS - MUSE: 73 DEGREES
PR INTERVAL - MUSE: 188 MS
QRS DURATION - MUSE: 128 MS
QT - MUSE: 402 MS
QTC - MUSE: 472 MS
R AXIS - MUSE: 59 DEGREES
SYSTOLIC BLOOD PRESSURE - MUSE: NORMAL MMHG
T AXIS - MUSE: 59 DEGREES
VENTRICULAR RATE- MUSE: 83 BPM

## 2021-09-19 PROCEDURE — 250N000013 HC RX MED GY IP 250 OP 250 PS 637: Performed by: PSYCHIATRY & NEUROLOGY

## 2021-09-19 PROCEDURE — 124N000002 HC R&B MH UMMC

## 2021-09-19 PROCEDURE — 250N000013 HC RX MED GY IP 250 OP 250 PS 637: Performed by: PHYSICIAN ASSISTANT

## 2021-09-19 RX ADMIN — POLYETHYLENE GLYCOL 3350 17 G: 17 POWDER, FOR SOLUTION ORAL at 08:48

## 2021-09-19 RX ADMIN — DOCUSATE SODIUM 100 MG: 100 CAPSULE, LIQUID FILLED ORAL at 20:21

## 2021-09-19 RX ADMIN — BENZTROPINE MESYLATE 1 MG: 1 TABLET ORAL at 08:48

## 2021-09-19 RX ADMIN — LEVOTHYROXINE SODIUM 88 MCG: 88 TABLET ORAL at 08:48

## 2021-09-19 RX ADMIN — DOCUSATE SODIUM 100 MG: 100 CAPSULE, LIQUID FILLED ORAL at 08:48

## 2021-09-19 RX ADMIN — GUAIFENESIN 600 MG: 600 TABLET, EXTENDED RELEASE ORAL at 08:49

## 2021-09-19 RX ADMIN — ATORVASTATIN CALCIUM 80 MG: 80 TABLET, FILM COATED ORAL at 20:22

## 2021-09-19 RX ADMIN — ASPIRIN 81 MG CHEWABLE TABLET 81 MG: 81 TABLET CHEWABLE at 08:48

## 2021-09-19 RX ADMIN — SALMETEROL XINAFOATE 1 PUFF: 50 POWDER, METERED ORAL; RESPIRATORY (INHALATION) at 20:24

## 2021-09-19 RX ADMIN — RIVASTIGMINE 1 PATCH: 4.6 PATCH TRANSDERMAL at 08:49

## 2021-09-19 RX ADMIN — GUAIFENESIN 600 MG: 600 TABLET, EXTENDED RELEASE ORAL at 20:22

## 2021-09-19 RX ADMIN — SALMETEROL XINAFOATE 1 PUFF: 50 POWDER, METERED ORAL; RESPIRATORY (INHALATION) at 08:59

## 2021-09-19 RX ADMIN — METOPROLOL TARTRATE 25 MG: 25 TABLET, FILM COATED ORAL at 20:21

## 2021-09-19 RX ADMIN — Medication 50 MCG: at 08:49

## 2021-09-19 RX ADMIN — METOPROLOL TARTRATE 25 MG: 25 TABLET, FILM COATED ORAL at 08:49

## 2021-09-19 RX ADMIN — BENZTROPINE MESYLATE 1 MG: 1 TABLET ORAL at 20:21

## 2021-09-19 RX ADMIN — CLOZAPINE 300 MG: 100 TABLET ORAL at 20:22

## 2021-09-19 RX ADMIN — MEMANTINE 10 MG: 5 TABLET ORAL at 08:49

## 2021-09-19 RX ADMIN — MEMANTINE 10 MG: 5 TABLET ORAL at 20:21

## 2021-09-19 ASSESSMENT — ACTIVITIES OF DAILY LIVING (ADL)
DRESS: SCRUBS (BEHAVIORAL HEALTH)
LAUNDRY: UNABLE TO COMPLETE
HYGIENE/GROOMING: PROMPTS
ORAL_HYGIENE: PROMPTS

## 2021-09-19 NOTE — PLAN OF CARE
"Problem: Psychotic Symptoms  Goal: Psychotic Symptoms  Description: Signs and symptoms of listed problems will be absent or manageable.  Outcome: No Change     John had a good shift and remained behaviorally in control for a majority of the time. He had one outburst in the TV lounge, yelling \"fucking shit bull shit!\" but is receptive when Writer reminds him not to yell. He then laughs to himself. He continues to engage in self-talk. He is withdrawn but polite with staff.    Pt put on briefs and is wearing these to bed in hopes of ordering pizza next week as a reward for doing this.  "

## 2021-09-19 NOTE — PLAN OF CARE
Problem: Psychotic Symptoms  Goal: Psychotic Symptoms  Description: Signs and symptoms of listed problems will be absent or manageable.  Outcome: No Change       John had a good shift.  Pt spent some time in the lounge area.  No interaction with peers.  Pt is constantly talking to himself,  appears to be responding to internal stimuli.  No nutritional concerns, eating his meals.  Med compliant.  Will continue to assess.

## 2021-09-19 NOTE — PLAN OF CARE
Pt slept for a total of  6.75 hours during this overnight shift. No PRN medications were administered and no concerns were noted.   Problem: Sleep Disturbance  Goal: Adequate Sleep/Rest  Outcome: Improving

## 2021-09-19 NOTE — PLAN OF CARE
"Problem: Cognitive Impairment (Psychotic Signs/Symptoms)  Goal: Optimal Cognitive Function (Psychotic Signs/Symptoms)  Outcome: No Change     John remains overall behaviorally in control this shift. He did yell \"fuck shit!\" in the TV lounge once and told Writer that his voice \"Fidel\" is \"saying some shit.\" Writer reminded him to not yell and he was receptive to this reminder.     He continues to respond to internal stimuli and engage in self talk and laughs to himself. Pt stares off into space and states to nobody in particular, \"I've never been to detention. Don't say that about me.\" He then laughs. He reports doing \"fine.\" He asked multiple times tonight, \"What floor am I on? Where is this?\" His oral tardive dyskinesia sx remain the same and are not bothersome. He is pleasant with writer and his overall mood is calm.    Pt was compliant with putting on briefs tonight when W reminded him that he has trouble making it to the bathroom. W also reminded him that he may get a chance to order pizza if he keeps wearing briefs. He put them on and went to sleep.  "

## 2021-09-20 LAB — SARS-COV-2 RNA RESP QL NAA+PROBE: NEGATIVE

## 2021-09-20 PROCEDURE — U0005 INFEC AGEN DETEC AMPLI PROBE: HCPCS | Performed by: PSYCHIATRY & NEUROLOGY

## 2021-09-20 PROCEDURE — 99232 SBSQ HOSP IP/OBS MODERATE 35: CPT | Performed by: PSYCHIATRY & NEUROLOGY

## 2021-09-20 PROCEDURE — 36415 COLL VENOUS BLD VENIPUNCTURE: CPT | Performed by: PSYCHIATRY & NEUROLOGY

## 2021-09-20 PROCEDURE — 80159 DRUG ASSAY CLOZAPINE: CPT | Performed by: PSYCHIATRY & NEUROLOGY

## 2021-09-20 PROCEDURE — 250N000013 HC RX MED GY IP 250 OP 250 PS 637: Performed by: PSYCHIATRY & NEUROLOGY

## 2021-09-20 PROCEDURE — 124N000002 HC R&B MH UMMC

## 2021-09-20 PROCEDURE — 250N000013 HC RX MED GY IP 250 OP 250 PS 637: Performed by: PHYSICIAN ASSISTANT

## 2021-09-20 RX ADMIN — RIVASTIGMINE 1 PATCH: 4.6 PATCH TRANSDERMAL at 08:49

## 2021-09-20 RX ADMIN — ASPIRIN 81 MG CHEWABLE TABLET 81 MG: 81 TABLET CHEWABLE at 08:46

## 2021-09-20 RX ADMIN — SALMETEROL XINAFOATE 1 PUFF: 50 POWDER, METERED ORAL; RESPIRATORY (INHALATION) at 08:50

## 2021-09-20 RX ADMIN — LEVOTHYROXINE SODIUM 88 MCG: 88 TABLET ORAL at 08:47

## 2021-09-20 RX ADMIN — BENZTROPINE MESYLATE 1 MG: 1 TABLET ORAL at 19:59

## 2021-09-20 RX ADMIN — DOCUSATE SODIUM 100 MG: 100 CAPSULE, LIQUID FILLED ORAL at 08:46

## 2021-09-20 RX ADMIN — GUAIFENESIN 600 MG: 600 TABLET, EXTENDED RELEASE ORAL at 08:46

## 2021-09-20 RX ADMIN — CLOZAPINE 300 MG: 100 TABLET ORAL at 20:00

## 2021-09-20 RX ADMIN — POLYETHYLENE GLYCOL 3350 17 G: 17 POWDER, FOR SOLUTION ORAL at 08:50

## 2021-09-20 RX ADMIN — ATORVASTATIN CALCIUM 80 MG: 80 TABLET, FILM COATED ORAL at 19:59

## 2021-09-20 RX ADMIN — METOPROLOL TARTRATE 25 MG: 25 TABLET, FILM COATED ORAL at 19:59

## 2021-09-20 RX ADMIN — MEMANTINE 10 MG: 5 TABLET ORAL at 19:59

## 2021-09-20 RX ADMIN — Medication 50 MCG: at 08:50

## 2021-09-20 RX ADMIN — GUAIFENESIN 600 MG: 600 TABLET, EXTENDED RELEASE ORAL at 19:59

## 2021-09-20 RX ADMIN — MEMANTINE 10 MG: 5 TABLET ORAL at 08:51

## 2021-09-20 RX ADMIN — SALMETEROL XINAFOATE 1 PUFF: 50 POWDER, METERED ORAL; RESPIRATORY (INHALATION) at 20:01

## 2021-09-20 RX ADMIN — DOCUSATE SODIUM 100 MG: 100 CAPSULE, LIQUID FILLED ORAL at 19:59

## 2021-09-20 RX ADMIN — BENZTROPINE MESYLATE 1 MG: 1 TABLET ORAL at 08:46

## 2021-09-20 RX ADMIN — METOPROLOL TARTRATE 25 MG: 25 TABLET, FILM COATED ORAL at 10:37

## 2021-09-20 ASSESSMENT — ACTIVITIES OF DAILY LIVING (ADL)
LAUNDRY: WITH SUPERVISION
DRESS: PROMPTS
DRESS: SCRUBS (BEHAVIORAL HEALTH)
ORAL_HYGIENE: PROMPTS
HYGIENE/GROOMING: PROMPTS
ORAL_HYGIENE: PROMPTS
HYGIENE/GROOMING: PROMPTS

## 2021-09-20 NOTE — PLAN OF CARE
Assessment/Intervention/Current Symtoms and Care Coordination  -Chart review  -Pre round meeting with team  -Rounded with team, addressed patient needs/concerns  -Post round meeting with team  Current Symptoms include the following: Psychosis    Discharge Plan or Goal  Pending stabilization & development of a safe discharge plan.  Considerations include: nursing home    Barriers to Discharge  Patient requires further psychiatric stabilization due to current symptomology    Referral Status  guardianship and nursing home    Legal Status  Patient is under MI commitment in [facility]

## 2021-09-20 NOTE — PLAN OF CARE
Problem: Psychotic Symptoms  Goal: Psychotic Symptoms  Description: Signs and symptoms of listed problems will be absent or manageable.  Outcome: No Change     John was compliant with taking a shower this shift, in exchange for an extra snack. He also allowed Writer to collect COVID test. Results: Negative.    He continues to engage in self talk and respond to internal stimuli. Pt remained in behavioral control this shift and is polite and pleasant with Writer. Pt continues to have oral TD sx that are baseline and not bothersome to him. He endorses hearing voices and looks at the wall next to him and talks to it.     Pt was compliant with putting on briefs before bed in exchange for ordering pizza sometime this week.

## 2021-09-20 NOTE — PROGRESS NOTES
"[facility], [place]   Psychiatric Progress Note  Hospital Day: 82        Interim History:   The patient's care was discussed with the treatment team during the daily team meeting and/or staff's chart notes were reviewed.  Staff report patient was visible in milieu over weekend but isolates to self, observed responding to internal stimuli, making paranoid statements and having verbal outburst, was redirectable, taking medications as prescribed, was adherent with incontinence briefs, no acute events over weekend.     Upon interview pt was sitting in the lounge alone, he appeared to be talking to self. Requested patient meet with writer in his room or other more private location, he declined stating he wanted to meet in his current spot in the milieu. He stated mood is \"fine\", he then appeared distracted and responding to internal stimuli, he mumbled something about $10,000 and was difficult to engage, when asked if he was hearing voices he started to laugh and stated \"yes\", he denied SI or HI. Attempted to discuss plan to have pichrissiea ordered for dinner for him tonight, he would not respond to writers questions any further, encouraged him to take a shower and he continued to not engage and interview terminated.          Medications:       aspirin  81 mg Oral Daily     atorvastatin  80 mg Oral At Bedtime     benztropine  1 mg Oral BID     cholecalciferol  1,250 mcg Oral Q7 Days     cloZAPine  300 mg Oral At Bedtime     docusate sodium  100 mg Oral BID     guaiFENesin  600 mg Oral BID     levothyroxine  88 mcg Oral Daily     memantine  10 mg Oral BID     metoprolol tartrate  25 mg Oral BID     polyethylene glycol  17 g Oral Daily     rivastigmine  1 patch Transdermal Daily     rivastigmine   Transdermal Q8H     salmeterol  1 puff Inhalation BID     vitamin D3  50 mcg Oral Daily          Allergies:     Allergies   Allergen Reactions     Ibuprofen      Latex           Labs:     No results found " "for this or any previous visit (from the past 48 hour(s)).       Psychiatric Examination:     /74   Pulse 101   Temp 98.6  F (37  C) (Oral)   Resp 16   Ht 1.829 m (6')   Wt 89.1 kg (196 lb 6.4 oz)   SpO2 97%   BMI 26.64 kg/m    Weight is 196 lbs 6.4 oz  Body mass index is 26.64 kg/m .    Orthostatic Vitals       Most Recent      Sitting Orthostatic /76 09/20 1036    Sitting Orthostatic Pulse (bpm) 96 09/20 1036    Standing Orthostatic /75 09/20 1036    Standing Orthostatic Pulse (bpm) 96 09/20 1036        Appearance: awake, alert and untidy  Attitude: somewhat cooperative  Eye Contact:  fair   Mood:  \"fine\" continues to have periods of irritability   Affect:  mood congruent and intensity is flat  Speech:  soft volume, raspy ,dysarthric   Language: fluent and intact in English  Psychomotor, Gait, Musculoskeletal:  no evidence of dystonia, or tics, continues to have movements of buccolingual dyskinesia present during interview  Thought Process: disorganized  Associations:  no loose associations  Thought Content:  denies SI and HI; continues to be delusional and observed responding to internal stimuli during interview, endorses AH, denies VH  Insight:  limited  Judgement:  limited  Oriented to:  person  Attention Span and Concentration:  limited  Recent and Remote Memory:  limited  Fund of Knowledge:  delayed    Clinical Global Impressions  First:  Considering your total clinical experience with this particular patient population, how severe are the patient's symptoms at this time?: 7 (07/01/21 0630)  Compared to the patient's condition at the START of treatment, this patient's condition is: 4 (07/01/21 0630)  Most recent:  Considering your total clinical experience with this particular patient population, how severe are the patient's symptoms at this time?: 6 (09/08/21 0551)  Compared to the patient's condition at the START of treatment, this patient's condition is: 3 (09/08/21 0551)           " Precautions:     Behavioral Orders   Procedures     Assault precautions     Code 1     Elopement precautions     Routine Programming     As clinically indicated     Single Room     Status 15     Every 15 minutes.          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   Stimulant use disorder, in remission.   Transaminitis, possibly medication induced   Hx of CVA  Vit D deficiency  Hypertension  COPD  Hx of infective endocarditis with severe mitral and aortic regurgitation s/p biprostehtic valve replacement 2015  Hx of HFrEF now recovered  Tobacco use disorder  Non-severe malnutrition   Urinary incontinence         Assessment & Plan:   Assessment and hospital summary:  The patient is a 56yo male with a history of schizophrenia and TBI and multiple medical co-morbidities as above who was admitted after being transferred from [facility] medical [facility] after a nearly year-long stay. Is currently under commitment with Yanez recently amended to include Clozaril. While at [facility] was noted to have ongoing agitation and frequently attempting to elope from unit requiring 1:1 staffing for safety. He was started on 1:1 staff upon transfer, this was discontinued as patient has been more cooperative without elopement attempts. IM consult completed on admission and continued to follow due to elevated LFTs. Haldol and VPA discontinued due to LFTs. Cross titration completed from risperidone to clozaril after Yanez amended. EPSE/TD movements noted, have appeared stable over past few weeks. Patient has continued to have disorganization and active psychosis symptoms which appear to be at patients baseline. Team continues to work on disposition which barriers include patient has no guardian or next of kin willing to act as surrogate decision maker, see CTC  notes for complete details.     Psychiatric treatment/inteventions:  Medications:   -continue Clozaril 300mg at bedtime for psychosis and agitation. Will not adjust dose further at this time, movements consistent with TD noted, and level in therapeutic range, repeat level ordered 9/17 but cancelled by lab, unclear why, re-ordered  -continue benztropine 1mg BID for EPSE  -continue memantine 5mg daily for neurocognitive disorder  -continue risperdal 1mg Q6H PRN agitation   -continue lorazepam 0.5-1.0mg Q4H PRN anxiety or severe agitation  -continue rivastigmine patch started 8/2 to target neurocognitive disorder     Laboratory/Imaging: weekly WBC and ANC for clozapine monitoring continues ANC 9/17 3.4, clozapine level also added to draw but appears to have been cancelled, have re-ordered; weekly covid ordered     Patient will be treated in therapeutic milieu with appropriate individual and group therapies as described.     Medical treatment/interventions:  Medical concerns:   1) IM consult placed on admission, per consult note on 7/1/21:   Diagnoses:    #Major neurocognitive disorder dt hx of TBI and alcohol use disorder  #Schizophrenia  #Under commitment  Had been residing in group home prior to admission.  Brought to hospital for evaluation of aggressive behaviors. Group home now unwilling to take him back. Has had numerous CODE 21s called this stay. Seen by psych, meds adjusted. Is currently under civil commitment and court hold through [facility] until 7/31/21. Please see recent discharge summary for details on 6/30/20201.   -Management per psychiatry      #Vitamin D Deficiency- Vit D level 16. Has been in hospital since Sept 2020. Started on cholecalciferol 10438 units on 6/17. Continue high-dose weekly replacement for 6 weeks total. Following high dose replacement recommend starting vitamin D 2000 international unit(s) daily replacement (start date 8/5/2021).      #Possible Tardive Dyskinesia vs EPSE- Per  psych assessment on 4/1/21, noted to have possible tardive dyskinesia vs extrapyramidal side effects of meds.  - At that time, recommended to increase Cogentin to 1mg BID and add vitamin E 800 international unit(s) daily.      #Hx of CVA - hx of basal ganglia stroke in 2015. No focal neuro deficits aside from cognitive dysfunction as above.      #Hyperlipidemia - Continue PTA ASA 81 mg daily and atorvastatin 80 mg daily.      #COPD - Not O2 dependent. Continue PTA on salmeterol and albuterol PRN. Patient occasionally refusing inhalers, encourage compliance. Mucinex added for intermittent cough at [facility].      #Hypothyroidism- TSH 3.18 on 9/2020. Repeat recently on 5/29/2021 slightly elevated 5.18, with no T4 . Continue PTA levothyroxine 88 mcg daily. Repeat TSH with reflex to T4.      #Hx of infective endocarditis with severe mitral and aortic regurgitation S/P bioprosthetic valve replacement (10/2015)  #Hx of HFrEF, now recovered, not in acute exacerbation  Follows with Dr. Dockery of [facility] V[facility], last seen in 1/2020. Echocardiogram in 5/2016 with EF 50%, no evidence for prosthetic mitral and aortic valve dysfunction.   - Follow-up with outpatient Cardiology upon dismissal from the hospital (on every 2 year follow-ups).  - Continue ASA 81 mg daily      #Tobacco use disorder- Using nicotine patch and PRN gum.     #Non-severe malnutrition- Nutrition consulted and following at OSH.      Ordered CMP, CBC, and TSH with reflex.  No further recommendations at this time. Please page the on-call SHREE for any intercurrent medical issues which arise.      ADDENDUM: TSH normal. CBC normal. ALT elevated at 174, with unsatisfactory AST. Per chart review, LFTs last checked in December with  and . T bili and alk phos.  Reviewed with pharmacy, and possible medications causing would be Haldol, depakote, and statin. Discussed with psych who will taper haldol and Depakote. Holding statin, will check  LDL.  Will repeat LFTs in AM, and check CK. Medicine will follow up on repeat LFTs, and CK.      Edith Alfaro PA-C  Hospitalist Service     2) Per updated progress note by IM 7/4:   A/P:  #Transaminitis - slowly rising LFTs since December. Asymptomatic. Reviewed with pharmacy, and possible medications causing would be Haldol, depakote, and statin. Discussed with psych who will taper haldol and Depakote. Holding statin, (total cholesterol 91, with LDL 41). Repeat LFTs continue to rise slightly.   -Abdominal US tomorrow (NPO after midnight)  -Hepatitis B surface ab and agn and Hep C ab   -Repeat LFTs in 3 days     Medicine will follow up on Abdominal US, viral hepatitis studies and repeat LFTs.  No further recommendations at this time. Please page the on-call SHREE for any intercurrent medical issues which arise.      Edith Alfaro PA-C  Hospitalist Service     Per progress note 7/12:      Brief Medicine Note     Medicine following peripherally for LFT monitoring.  LFTs today improved:  (182) and ALT 92 (129). T bili and ALP WNL.      Today's vital signs, medications, and nursing notes were reviewed.       /77 (BP Location: Left arm)   Pulse 110   Temp 97.8  F (36.6  C) (Oral)   Resp 16   Ht 1.829 m (6')   Wt 87.5 kg (192 lb 14.4 oz)   SpO2 95%   BMI 26.16 kg/m       Continue current plan of care. Will continue to trend CMP In 3 days to ensure continued downtrend.      Kim Harman PA-C  Hospitalist Service        3) Urinary incontinence and stool incontinence: RN staff first reported urinary incontinence over weekend 7/9, per chart review patient has history of intermittent urinary incontinence going back to at least 2016, continues to have intermittent episodes of incontinence, continues to have both urinary and stool incontinence, placed IM consult for further assessment of need for additional work up given pts limitations as historian, appreciate assistance, no abnormalities noted  "on evaluation including bladder scan. Patient declining to wear incontinence briefs most days, staff continuing to encourage and started behavioral plan 9/10 to provide patient with pizza 1x per week for cooperation with incontinence cares. Pt did not wear briefs evening of 9/14, more agreeable 9/15.     4) Unvaccinated for Covid-19, see interim history of note 8/19 by writer, attempted to discuss R/B/A of vaccination with patient, he did not demonstrate capacity, has no next of kin acting as surrogate decision maker, is awaiting guardianship, given patients age and medical co-morbidities the risks of receiving Covid vaccine are outweighed by the benefits, including patient is required to be vaccinated to be considered for discharge to some nursing home facilities which would be a more appropriate milieu and less restrictive environment for patient than locked inpatient psychiatric unit. Dr. Nickerson provided second opinion regarding administration of Covid vaccine on 8.20 and agrees with recommendation.   -Covid vaccine order placed 8/23, J&J vaccine given 8/24    5) Pt reporting pain in \"heart and arms\", discussed with IM who will come evaluate patient given significant cardiac history, EKG completed and IM evaluated patient who was no longer reporting any pain/discomfort, see IM progress note 9/17 for complete details, agree with assessment and plan as outlined by IM    This note was created by undersigned using a Dragon dictation system. All typing errors or contextual distortion are unintentional and software inherent.     Disposition Plan   Reason for ongoing admission: is unable to care for self due to severe psychosis or mariusz and neurocognitive disorder   Discharge location: TBD, likely locked NH facility , unable to progress on discharge planning due to guardianship not being in place, see CTC notes  Discharge Medications: not ordered  Follow-up Appointments: not scheduled  Legal Status: Full MI commitment " and Beau     Entered by: Jeanette Dobbins on 9/20/2021 at 8:26 AM

## 2021-09-20 NOTE — PLAN OF CARE
Problem: Sleep Disturbance  Goal: Adequate Sleep/Rest  Outcome: Improving    Patient slept for the most part of the night, woke up at 6am. Patient has no complaints, slept for a total of 7.25 hours.

## 2021-09-20 NOTE — PLAN OF CARE
"  Problem: Psychotic Symptoms  Goal: Psychotic Symptoms  Description: Signs and symptoms of listed problems will be absent or manageable.  Outcome: No Change   Patient ate breakfast and then slept in bed until late morning. Patient denies depression, anxiety, suicidal thoughts, pain, and hallucinations. Declined a shower. Writer asked patient what he enjoys doing and patient said \"laying down.\" Writer asked patient where he is at and patient said \"[facility].\"  Had one outburst with writer other wise has been calm the rest of the shift. Patient's mouth is moving almost continuously while awake.  When writer asked patient questions sometimes patient would just stare into space and not respond. Patient did not have any eye contact with writer during check in. Patient ate pizza for lunch.  "

## 2021-09-21 PROCEDURE — 250N000013 HC RX MED GY IP 250 OP 250 PS 637: Performed by: PSYCHIATRY & NEUROLOGY

## 2021-09-21 PROCEDURE — 124N000002 HC R&B MH UMMC

## 2021-09-21 PROCEDURE — 250N000013 HC RX MED GY IP 250 OP 250 PS 637: Performed by: PHYSICIAN ASSISTANT

## 2021-09-21 PROCEDURE — 99232 SBSQ HOSP IP/OBS MODERATE 35: CPT | Performed by: PSYCHIATRY & NEUROLOGY

## 2021-09-21 RX ADMIN — METOPROLOL TARTRATE 25 MG: 25 TABLET, FILM COATED ORAL at 20:14

## 2021-09-21 RX ADMIN — SALMETEROL XINAFOATE 1 PUFF: 50 POWDER, METERED ORAL; RESPIRATORY (INHALATION) at 08:55

## 2021-09-21 RX ADMIN — MEMANTINE 10 MG: 5 TABLET ORAL at 20:14

## 2021-09-21 RX ADMIN — POLYETHYLENE GLYCOL 3350 17 G: 17 POWDER, FOR SOLUTION ORAL at 08:54

## 2021-09-21 RX ADMIN — RIVASTIGMINE 1 PATCH: 4.6 PATCH TRANSDERMAL at 08:52

## 2021-09-21 RX ADMIN — ASPIRIN 81 MG CHEWABLE TABLET 81 MG: 81 TABLET CHEWABLE at 08:57

## 2021-09-21 RX ADMIN — CLOZAPINE 300 MG: 100 TABLET ORAL at 20:14

## 2021-09-21 RX ADMIN — LEVOTHYROXINE SODIUM 88 MCG: 88 TABLET ORAL at 08:54

## 2021-09-21 RX ADMIN — DOCUSATE SODIUM 100 MG: 100 CAPSULE, LIQUID FILLED ORAL at 20:14

## 2021-09-21 RX ADMIN — BENZTROPINE MESYLATE 1 MG: 1 TABLET ORAL at 08:53

## 2021-09-21 RX ADMIN — DOCUSATE SODIUM 100 MG: 100 CAPSULE, LIQUID FILLED ORAL at 08:53

## 2021-09-21 RX ADMIN — METOPROLOL TARTRATE 25 MG: 25 TABLET, FILM COATED ORAL at 09:01

## 2021-09-21 RX ADMIN — Medication 50 MCG: at 09:01

## 2021-09-21 RX ADMIN — GUAIFENESIN 600 MG: 600 TABLET, EXTENDED RELEASE ORAL at 20:14

## 2021-09-21 RX ADMIN — SALMETEROL XINAFOATE 1 PUFF: 50 POWDER, METERED ORAL; RESPIRATORY (INHALATION) at 20:15

## 2021-09-21 RX ADMIN — BENZTROPINE MESYLATE 1 MG: 1 TABLET ORAL at 20:14

## 2021-09-21 RX ADMIN — MEMANTINE 10 MG: 5 TABLET ORAL at 08:53

## 2021-09-21 RX ADMIN — ATORVASTATIN CALCIUM 80 MG: 80 TABLET, FILM COATED ORAL at 20:14

## 2021-09-21 ASSESSMENT — ACTIVITIES OF DAILY LIVING (ADL)
DRESS: PROMPTS
HYGIENE/GROOMING: PROMPTS
DRESS: SCRUBS (BEHAVIORAL HEALTH)
LAUNDRY: WITH SUPERVISION
ORAL_HYGIENE: PROMPTS
HYGIENE/GROOMING: PROMPTS
LAUNDRY: WITH SUPERVISION
ORAL_HYGIENE: PROMPTS

## 2021-09-21 NOTE — PLAN OF CARE
Problem: Psychotic Symptoms  Goal: Psychotic Symptoms  Description: Signs and symptoms of listed problems will be absent or manageable.  Outcome: No Change       Patient slept past breakfast this morning.  With encouragement, the patient attended group.  Patient's affect is flat and blunted.  Speech is incoherent as patient rambles.  It is difficult to assess patient thoughts process d/t incoherent speech and refusal of answers.  Patient did not curse during morning medication administration.  Patient behavior remains calm and cooperative.  Patient is absent of any unsafe behaviors.  Continue to monitor.

## 2021-09-21 NOTE — PLAN OF CARE
Assessment/Intervention/Current Symtoms and Care Coordination  -Chart review  -Pre round meeting with team  -Rounded with team, addressed patient needs/concerns  -Post round meeting with team  Current Symptoms include the following: Psychosis    Discharge Plan or Goal  Pending stabilization & development of a safe discharge plan.  Considerations include:  nursing home    Barriers to Discharge  Nursing homes are requiring a guardian and pt does not have a guardian     Referral Status  Guardianship and nursing home  are needed  Management is working to find a guardian      Legal Status  Patient is under MI commitment in [facility]

## 2021-09-21 NOTE — PLAN OF CARE
Night Shift Summary (9/20/21 into 09/21/21)    Pt in bed sleeping at start of shift. Breathing quiet and unlabored. Appears to have slept 7 hours.    Assault and Elopement precautions in addition to Single Room order; any related events noted above.     Will continue to monitor and assess.       Problem: Behavioral Health Plan of Care  Goal: Absence of New-Onset Illness or Injury  Outcome: Improving     Problem: Sleep Disturbance  Goal: Adequate Sleep/Rest  Outcome: Improving

## 2021-09-21 NOTE — PLAN OF CARE
BEHAVIORAL TEAM DISCUSSION    Participants:   Dr. Dobbins, Lynn Singh RN, Marylu WEN, Coral reed OT      Progress:   Pt's symptoms have improved.  Pt is not exhibiting any behavioral problems.  Pt does have internal stimuli and sometimes shouts out to no one.    Anticipated length of stay:   2 months    Continued Stay Criteria/Rationale:   The pt needs a safe place to discharge too as he si not able to care for himself.    Medical/Physical:  Covid testbriana weekly - negative  incontinent but now wearing briefs       Precautions:   Behavioral Orders   Procedures     Assault precautions     Code 1     Elopement precautions     Routine Programming     As clinically indicated     Single Room     Status 15     Every 15 minutes.     Plan:   Encourage briefs  Weekly Pizza  Look for guardian  Clozaril    Rationale for change in precautions or plan: no changes

## 2021-09-21 NOTE — PROGRESS NOTES
"[facility], [place]   Psychiatric Progress Note  Hospital Day: 83        Interim History:   The patient's care was discussed with the treatment team during the daily team meeting and/or staff's chart notes were reviewed.  Staff report patient visible in milieu but keeps to self, cooperative with taking a shower in evening, taking medications as prescribed, continues to be observed responding to internal stimuli, no acute events overnight.     Upon interview pt reports mood is \"fine\", he is tolerating medications, denies noticing side effects including noticing TD movements. He denies SI or HI, endorses AH and continues to appear responding to internal stimuli during interview. Oriented only to self, continues to believe he is at [facility]. Denies any pain/discomfort. No additional concerns outside of wanting a cigarette.          Medications:       aspirin  81 mg Oral Daily     atorvastatin  80 mg Oral At Bedtime     benztropine  1 mg Oral BID     cholecalciferol  1,250 mcg Oral Q7 Days     cloZAPine  300 mg Oral At Bedtime     docusate sodium  100 mg Oral BID     guaiFENesin  600 mg Oral BID     levothyroxine  88 mcg Oral Daily     memantine  10 mg Oral BID     metoprolol tartrate  25 mg Oral BID     polyethylene glycol  17 g Oral Daily     rivastigmine  1 patch Transdermal Daily     rivastigmine   Transdermal Q8H     salmeterol  1 puff Inhalation BID     vitamin D3  50 mcg Oral Daily          Allergies:     Allergies   Allergen Reactions     Ibuprofen      Latex           Labs:     Recent Results (from the past 48 hour(s))   Asymptomatic COVID-19 Virus (Coronavirus) by PCR Nasopharyngeal    Collection Time: 09/20/21  6:59 PM    Specimen: Nasopharyngeal; Swab   Result Value Ref Range    SARS CoV2 PCR Negative Negative          Psychiatric Examination:     /78   Pulse 86   Temp 98.1  F (36.7  C) (Oral)   Resp 16   Ht 1.829 m (6')   Wt 89.1 kg (196 lb 6.4 " "oz)   SpO2 97%   BMI 26.64 kg/m    Weight is 196 lbs 6.4 oz  Body mass index is 26.64 kg/m .    Orthostatic Vitals       Most Recent      Lying Orthostatic /74 09/21 0858    Lying Orthostatic Pulse (bpm) 85 09/21 0858    Sitting Orthostatic /76 09/20 1036    Sitting Orthostatic Pulse (bpm) 96 09/20 1036    Standing Orthostatic /75 09/20 1036    Standing Orthostatic Pulse (bpm) 96 09/20 1036        Appearance: awake, alert and untidy  Attitude: somewhat cooperative  Eye Contact:  fair   Mood:  \"fine\" continues to have periods of irritability   Affect:  mood congruent and intensity is flat  Speech:  soft volume, raspy ,dysarthric   Language: fluent and intact in English  Psychomotor, Gait, Musculoskeletal:  no evidence of dystonia, or tics, continues to have movements of buccolingual dyskinesia present during interview  Thought Process: disorganized  Associations:  no loose associations  Thought Content:  denies SI and HI; continues to be delusional and observed responding to internal stimuli during interview, endorses AH, denies VH  Insight:  limited  Judgement:  limited  Oriented to:  person  Attention Span and Concentration:  limited  Recent and Remote Memory:  limited  Fund of Knowledge:  delayed    Clinical Global Impressions  First:  Considering your total clinical experience with this particular patient population, how severe are the patient's symptoms at this time?: 7 (07/01/21 0630)  Compared to the patient's condition at the START of treatment, this patient's condition is: 4 (07/01/21 0630)  Most recent:  Considering your total clinical experience with this particular patient population, how severe are the patient's symptoms at this time?: 6 (09/08/21 0551)  Compared to the patient's condition at the START of treatment, this patient's condition is: 3 (09/08/21 0551)           Precautions:     Behavioral Orders   Procedures     Assault precautions     Code 1     Elopement precautions     " Routine Programming     As clinically indicated     Single Room     Status 15     Every 15 minutes.          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   Stimulant use disorder, in remission.   Transaminitis, possibly medication induced   Hx of CVA  Vit D deficiency  Hypertension  COPD  Hx of infective endocarditis with severe mitral and aortic regurgitation s/p biprostehtic valve replacement 2015  Hx of HFrEF now recovered  Tobacco use disorder  Non-severe malnutrition   Urinary incontinence         Assessment & Plan:   Assessment and hospital summary:  The patient is a 54yo male with a history of schizophrenia and TBI and multiple medical co-morbidities as above who was admitted after being transferred from [facility] after a nearly year-long stay. Is currently under commitment with Yanez recently amended to include Clozaril. While at [facility] was noted to have ongoing agitation and frequently attempting to elope from unit requiring 1:1 staffing for safety. He was started on 1:1 staff upon transfer, this was discontinued as patient has been more cooperative without elopement attempts. IM consult completed on admission and continued to follow due to elevated LFTs. Haldol and VPA discontinued due to LFTs. Cross titration completed from risperidone to clozaril after Yanez amended. EPSE/TD movements noted, have appeared stable over past few weeks. Patient has continued to have disorganization and active psychosis symptoms which appear to be at patients baseline. Team continues to work on disposition which barriers include patient has no guardian or next of kin willing to act as surrogate decision maker, see CTC notes for complete details.     Psychiatric treatment/inteventions:  Medications:   -continue Clozaril 300mg at  bedtime for psychosis and agitation. Will not adjust dose further at this time, movements consistent with TD noted, and level in therapeutic range, repeat level ordered 9/17 but cancelled by lab, unclear why, re-ordered  -continue benztropine 1mg BID for EPSE  -continue memantine 5mg daily for neurocognitive disorder  -continue risperdal 1mg Q6H PRN agitation   -continue lorazepam 0.5-1.0mg Q4H PRN anxiety or severe agitation  -continue rivastigmine patch started 8/2 to target neurocognitive disorder     Laboratory/Imaging: weekly WBC and ANC for clozapine monitoring continues ANC 9/17 3.4, clozapine level also added to draw but appears to have been cancelled, have re-ordered; weekly covid negative 9/20     Patient will be treated in therapeutic milieu with appropriate individual and group therapies as described.     Medical treatment/interventions:  Medical concerns:   1) IM consult placed on admission, per consult note on 7/1/21:   Diagnoses:    #Major neurocognitive disorder dt hx of TBI and alcohol use disorder  #Schizophrenia  #Under commitment  Had been residing in group home prior to admission.  Brought to hospital for evaluation of aggressive behaviors. Group home now unwilling to take him back. Has had numerous CODE 21s called this stay. Seen by psych, meds adjusted. Is currently under civil commitment and court hold through [facility] until 7/31/21. Please see recent discharge summary for details on 6/30/20201.   -Management per psychiatry      #Vitamin D Deficiency- Vit D level 16. Has been in hospital since Sept 2020. Started on cholecalciferol 48159 units on 6/17. Continue high-dose weekly replacement for 6 weeks total. Following high dose replacement recommend starting vitamin D 2000 international unit(s) daily replacement (start date 8/5/2021).      #Possible Tardive Dyskinesia vs EPSE- Per psych assessment on 4/1/21, noted to have possible tardive dyskinesia vs extrapyramidal side effects of  meds.  - At that time, recommended to increase Cogentin to 1mg BID and add vitamin E 800 international unit(s) daily.      #Hx of CVA - hx of basal ganglia stroke in 2015. No focal neuro deficits aside from cognitive dysfunction as above.      #Hyperlipidemia - Continue PTA ASA 81 mg daily and atorvastatin 80 mg daily.      #COPD - Not O2 dependent. Continue PTA on salmeterol and albuterol PRN. Patient occasionally refusing inhalers, encourage compliance. Mucinex added for intermittent cough at [facility].      #Hypothyroidism- TSH 3.18 on 9/2020. Repeat recently on 5/29/2021 slightly elevated 5.18, with no T4 . Continue PTA levothyroxine 88 mcg daily. Repeat TSH with reflex to T4.      #Hx of infective endocarditis with severe mitral and aortic regurgitation S/P bioprosthetic valve replacement (10/2015)  #Hx of HFrEF, now recovered, not in acute exacerbation  Follows with Dr. Dockery of Heart and V[facility] Cardiology, last seen in 1/2020. Echocardiogram in 5/2016 with EF 50%, no evidence for prosthetic mitral and aortic valve dysfunction.   - Follow-up with outpatient Cardiology upon dismissal from the hospital (on every 2 year follow-ups).  - Continue ASA 81 mg daily      #Tobacco use disorder- Using nicotine patch and PRN gum.     #Non-severe malnutrition- Nutrition consulted and following at OSH.      Ordered CMP, CBC, and TSH with reflex.  No further recommendations at this time. Please page the on-call SHREE for any intercurrent medical issues which arise.      ADDENDUM: TSH normal. CBC normal. ALT elevated at 174, with unsatisfactory AST. Per chart review, LFTs last checked in December with  and . T bili and alk phos.  Reviewed with pharmacy, and possible medications causing would be Haldol, depakote, and statin. Discussed with psych who will taper haldol and Depakote. Holding statin, will check LDL.  Will repeat LFTs in AM, and check CK. Medicine will follow up on repeat LFTs, and CK.      Edith  MADHAVI Alfaro  Hospitalist Service     2) Per updated progress note by IM 7/4:   A/P:  #Transaminitis - slowly rising LFTs since December. Asymptomatic. Reviewed with pharmacy, and possible medications causing would be Haldol, depakote, and statin. Discussed with psych who will taper haldol and Depakote. Holding statin, (total cholesterol 91, with LDL 41). Repeat LFTs continue to rise slightly.   -Abdominal US tomorrow (NPO after midnight)  -Hepatitis B surface ab and agn and Hep C ab   -Repeat LFTs in 3 days     Medicine will follow up on Abdominal US, viral hepatitis studies and repeat LFTs.  No further recommendations at this time. Please page the on-call SHREE for any intercurrent medical issues which arise.      Edith Alfaro PA-C  Hospitalist Service     Per progress note 7/12:      Brief Medicine Note     Medicine following peripherally for LFT monitoring.  LFTs today improved:  (182) and ALT 92 (129). T bili and ALP WNL.      Today's vital signs, medications, and nursing notes were reviewed.       /77 (BP Location: Left arm)   Pulse 110   Temp 97.8  F (36.6  C) (Oral)   Resp 16   Ht 1.829 m (6')   Wt 87.5 kg (192 lb 14.4 oz)   SpO2 95%   BMI 26.16 kg/m       Continue current plan of care. Will continue to trend CMP In 3 days to ensure continued downtrend.      Kim Harman PA-C  Hospitalist Service        3) Urinary incontinence and stool incontinence: RN staff first reported urinary incontinence over weekend 7/9, per chart review patient has history of intermittent urinary incontinence going back to at least 2016, continues to have intermittent episodes of incontinence, continues to have both urinary and stool incontinence, placed IM consult for further assessment of need for additional work up given pts limitations as historian, appreciate assistance, no abnormalities noted on evaluation including bladder scan. Patient declining to wear incontinence briefs most days, staff  "continuing to encourage and started behavioral plan 9/10 to provide patient with pizza 1x per week for cooperation with incontinence cares. Pt did not wear briefs evening of 9/14, more agreeable 9/15.     4) Unvaccinated for Covid-19, see interim history of note 8/19 by writer, attempted to discuss R/B/A of vaccination with patient, he did not demonstrate capacity, has no next of kin acting as surrogate decision maker, is awaiting guardianship, given patients age and medical co-morbidities the risks of receiving Covid vaccine are outweighed by the benefits, including patient is required to be vaccinated to be considered for discharge to some nursing home facilities which would be a more appropriate milieu and less restrictive environment for patient than locked inpatient psychiatric unit. Dr. Nickerson provided second opinion regarding administration of Covid vaccine on 8.20 and agrees with recommendation.   -Covid vaccine order placed 8/23, J&J vaccine given 8/24    5) On 9/17 Pt reported pain in \"heart and arms\", discussed with IM who evaluated patient given significant cardiac history, EKG completed and patient reported to IM he  was no longer having any pain/discomfort, see IM progress note 9/17 for complete details, agree with assessment and plan as outlined by IM    This note was created by undersigned using a Dragon dictation system. All typing errors or contextual distortion are unintentional and software inherent.     Disposition Plan   Reason for ongoing admission: is unable to care for self due to severe psychosis or mariusz and neurocognitive disorder   Discharge location: TBD, likely locked NH facility , unable to progress on discharge planning due to guardianship not being in place, see CTC notes  Discharge Medications: not ordered  Follow-up Appointments: not scheduled  Legal Status: Full MI commitment and Yanez     Entered by: Jeanette Dobbins on 9/21/2021 at 6:12 AM              "

## 2021-09-22 PROCEDURE — 250N000013 HC RX MED GY IP 250 OP 250 PS 637: Performed by: PSYCHIATRY & NEUROLOGY

## 2021-09-22 PROCEDURE — 250N000013 HC RX MED GY IP 250 OP 250 PS 637: Performed by: PHYSICIAN ASSISTANT

## 2021-09-22 PROCEDURE — 124N000002 HC R&B MH UMMC

## 2021-09-22 RX ADMIN — ATORVASTATIN CALCIUM 80 MG: 80 TABLET, FILM COATED ORAL at 20:39

## 2021-09-22 RX ADMIN — ASPIRIN 81 MG CHEWABLE TABLET 81 MG: 81 TABLET CHEWABLE at 09:34

## 2021-09-22 RX ADMIN — GUAIFENESIN 600 MG: 600 TABLET, EXTENDED RELEASE ORAL at 20:39

## 2021-09-22 RX ADMIN — DOCUSATE SODIUM 100 MG: 100 CAPSULE, LIQUID FILLED ORAL at 20:39

## 2021-09-22 RX ADMIN — METOPROLOL TARTRATE 25 MG: 25 TABLET, FILM COATED ORAL at 20:39

## 2021-09-22 RX ADMIN — SALMETEROL XINAFOATE 1 PUFF: 50 POWDER, METERED ORAL; RESPIRATORY (INHALATION) at 09:41

## 2021-09-22 RX ADMIN — Medication 50 MCG: at 09:34

## 2021-09-22 RX ADMIN — POLYETHYLENE GLYCOL 3350 17 G: 17 POWDER, FOR SOLUTION ORAL at 09:41

## 2021-09-22 RX ADMIN — GUAIFENESIN 600 MG: 600 TABLET, EXTENDED RELEASE ORAL at 09:33

## 2021-09-22 RX ADMIN — MEMANTINE 10 MG: 5 TABLET ORAL at 09:34

## 2021-09-22 RX ADMIN — BENZTROPINE MESYLATE 1 MG: 1 TABLET ORAL at 09:33

## 2021-09-22 RX ADMIN — MEMANTINE 10 MG: 5 TABLET ORAL at 20:39

## 2021-09-22 RX ADMIN — DOCUSATE SODIUM 100 MG: 100 CAPSULE, LIQUID FILLED ORAL at 09:34

## 2021-09-22 RX ADMIN — METOPROLOL TARTRATE 25 MG: 25 TABLET, FILM COATED ORAL at 09:33

## 2021-09-22 RX ADMIN — BENZTROPINE MESYLATE 1 MG: 1 TABLET ORAL at 20:39

## 2021-09-22 RX ADMIN — CLOZAPINE 300 MG: 100 TABLET ORAL at 20:39

## 2021-09-22 RX ADMIN — RIVASTIGMINE 1 PATCH: 4.6 PATCH TRANSDERMAL at 09:41

## 2021-09-22 RX ADMIN — LEVOTHYROXINE SODIUM 88 MCG: 88 TABLET ORAL at 09:34

## 2021-09-22 RX ADMIN — SALMETEROL XINAFOATE 1 PUFF: 50 POWDER, METERED ORAL; RESPIRATORY (INHALATION) at 20:43

## 2021-09-22 ASSESSMENT — ACTIVITIES OF DAILY LIVING (ADL)
HYGIENE/GROOMING: PROMPTS
DRESS: SCRUBS (BEHAVIORAL HEALTH)
LAUNDRY: UNABLE TO COMPLETE
ORAL_HYGIENE: PROMPTS

## 2021-09-22 NOTE — PLAN OF CARE
Problem: Psychotic Symptoms  Goal: Psychotic Symptoms  Outcome: No Change  Goal: Social and Therapeutic (Psychotic Symptoms).  Outcome: No Change    Patient is flat and blunted.  He continues to be confused and preoccupied today.  Up on unit but keeps to self.  Difficult to understand when patient speaks.  Patient does appear to be responding to internal stimuli.  He is observed engaging in self talk and is observed starring blankly.  Patient is medication compliant and remains safe on unit.  Will continue to monitor.  [place] RN

## 2021-09-22 NOTE — PLAN OF CARE
"Problem: Psychotic Symptoms  Goal: Psychotic Symptoms  Description: Signs and symptoms of listed problems will be absent or manageable.  Outcome: No Change     John remains behaviorally in control and had a good shift. He spends the shift in the milieu watching TV and not engaging with staff or peers. Pt engages in self talk and responds to internal stimuli. Writer asked if he is still hearing voices, pt laughs and states, \"all the time, you know that.\" He reports the voices are not bothersome to him. He has mild TD oral sx that do not bother him either.     He is overall pleasant with staff. W offered pt brief, pt states \"I got this one on!\" and shows W his brief. Unclear if this is the same brief from last night, W recommended he change it, pt says, \"No. Goodbye.\" Pt received HS meds and went to sleep.  "

## 2021-09-22 NOTE — PROGRESS NOTES
Behavioral Health  Note   Behavioral Health  Spirituality Group Note     Unit 30    Name: John Juárez    YOB: 1965   MRN: [id number]    Age: 55 year old     Patient attended -led group, which included discussion of spirituality, coping with illness and building resilience.   Patient attended group for 1.0 hrs.   patient minimally participated, but was respectful to the group process.     [facility]  Staff    Page [phone]

## 2021-09-22 NOTE — PLAN OF CARE
Night Shift Summary (9/21/21 into 09/22/21)    Pt in bed sleeping at start of shift. Breathing quiet and unlabored. Appears to have slept 7 hours.    Assault and Elopement precautions in addition to Single Room order; any related events noted above.     Will continue to monitor and assess.       Problem: Behavioral Health Plan of Care  Goal: Absence of New-Onset Illness or Injury  Outcome: Improving     Problem: Sleep Disturbance  Goal: Adequate Sleep/Rest  Outcome: Improving

## 2021-09-22 NOTE — PLAN OF CARE
Assessment/Intervention/Current Symtoms and Care Coordination  -Chart review  -Pre round meeting with team  -Rounded with team, addressed patient needs/concerns  -Post round meeting with team  Current Symptoms include the following: Psychosis, disorganization and confusion    Discharge Plan or Goal  Pending stabilization & development of a safe discharge plan.  Considerations include: locked behavorial unit in nursing home    Barriers to Discharge  The pt peñaloza snot have a guardian and a guardian has not been found for this pt. Nursing homes are requiring a guardian.    Referral Status  locked behavioral unit in nursing home    Legal Status  Patient is under MI commitment in [facility]

## 2021-09-22 NOTE — PLAN OF CARE
"  Problem: Psychotic Symptoms  Goal: Psychotic Symptoms  Description: Signs and symptoms of listed problems will be absent or manageable.  Outcome: No Change  Flowsheets (Taken 9/22/2021 1747)  Psychotic Symptoms Assessed: all  Psychotic Symptoms Present:   orientation   insight   memory deficits   thought process   mood   speech     Problem: Psychotic Symptoms  Goal: Social and Therapeutic (Psychotic Symptoms)  Description: Signs and symptoms of listed problems will be absent or manageable.  Recent Flowsheet Documentation  Taken 9/22/2021 1747 by Laura Mason RN  Psychotic Symptoms Assessed: all  Psychotic Symptoms Present:   orientation   insight   memory deficits   thought process   mood   speech    John has been present in the television lounge all evening. He has been continuously sitting in a chair and talking to himself.  He rambles a lot and most of what he says is not understandable. He is disheveled and unkept. He states \"I'm doing okay.\" John is eating well, drinking lots of decaf coffee. Nursing to continue to support current plan of care.     "

## 2021-09-23 LAB
CLOZAPINE SERPL-MCNC: 389 NG/ML
CLOZAPINE+NOR SERPL-MCNC: 677 NG/ML
CNO SERPL-MCNC: <100 NG/ML
NORCLOZAPINE SERPL-MCNC: 288 NG/ML

## 2021-09-23 PROCEDURE — 124N000002 HC R&B MH UMMC

## 2021-09-23 PROCEDURE — 250N000013 HC RX MED GY IP 250 OP 250 PS 637: Performed by: PSYCHIATRY & NEUROLOGY

## 2021-09-23 PROCEDURE — 99232 SBSQ HOSP IP/OBS MODERATE 35: CPT | Performed by: PSYCHIATRY & NEUROLOGY

## 2021-09-23 PROCEDURE — 250N000013 HC RX MED GY IP 250 OP 250 PS 637: Performed by: PHYSICIAN ASSISTANT

## 2021-09-23 RX ADMIN — ASPIRIN 81 MG CHEWABLE TABLET 81 MG: 81 TABLET CHEWABLE at 08:48

## 2021-09-23 RX ADMIN — SALMETEROL XINAFOATE 1 PUFF: 50 POWDER, METERED ORAL; RESPIRATORY (INHALATION) at 08:47

## 2021-09-23 RX ADMIN — GUAIFENESIN 600 MG: 600 TABLET, EXTENDED RELEASE ORAL at 20:11

## 2021-09-23 RX ADMIN — LEVOTHYROXINE SODIUM 88 MCG: 88 TABLET ORAL at 08:48

## 2021-09-23 RX ADMIN — BENZTROPINE MESYLATE 1 MG: 1 TABLET ORAL at 08:48

## 2021-09-23 RX ADMIN — DOCUSATE SODIUM 100 MG: 100 CAPSULE, LIQUID FILLED ORAL at 20:10

## 2021-09-23 RX ADMIN — POLYETHYLENE GLYCOL 3350 17 G: 17 POWDER, FOR SOLUTION ORAL at 08:48

## 2021-09-23 RX ADMIN — DOCUSATE SODIUM 100 MG: 100 CAPSULE, LIQUID FILLED ORAL at 08:48

## 2021-09-23 RX ADMIN — RIVASTIGMINE 1 PATCH: 4.6 PATCH TRANSDERMAL at 08:47

## 2021-09-23 RX ADMIN — BENZTROPINE MESYLATE 1 MG: 1 TABLET ORAL at 20:10

## 2021-09-23 RX ADMIN — Medication 50 MCG: at 08:47

## 2021-09-23 RX ADMIN — CHOLECALCIFEROL CAP 1.25 MG (50000 UNIT) 1250 MCG: 1.25 CAP at 08:53

## 2021-09-23 RX ADMIN — METOPROLOL TARTRATE 25 MG: 25 TABLET, FILM COATED ORAL at 20:17

## 2021-09-23 RX ADMIN — ATORVASTATIN CALCIUM 80 MG: 80 TABLET, FILM COATED ORAL at 20:10

## 2021-09-23 RX ADMIN — METOPROLOL TARTRATE 25 MG: 25 TABLET, FILM COATED ORAL at 08:48

## 2021-09-23 RX ADMIN — SALMETEROL XINAFOATE 1 PUFF: 50 POWDER, METERED ORAL; RESPIRATORY (INHALATION) at 20:20

## 2021-09-23 RX ADMIN — MEMANTINE 10 MG: 5 TABLET ORAL at 08:48

## 2021-09-23 RX ADMIN — GUAIFENESIN 600 MG: 600 TABLET, EXTENDED RELEASE ORAL at 08:47

## 2021-09-23 RX ADMIN — MEMANTINE 10 MG: 5 TABLET ORAL at 20:11

## 2021-09-23 RX ADMIN — CLOZAPINE 300 MG: 100 TABLET ORAL at 20:10

## 2021-09-23 ASSESSMENT — MIFFLIN-ST. JEOR: SCORE: 1754.32

## 2021-09-23 NOTE — PLAN OF CARE
Assessment/Intervention/Current Symtoms and Care Coordination  -Chart review  -Pre round meeting with team  -Rounded with team, addressed patient needs/concerns  -Post round meeting with team  Current Symptoms include the following: Psychosis, disorganization and confusion     Discharge Plan or Goal  Pending stabilization & development of a safe discharge plan.  Considerations include: locked behavorial unit in nursing home     Barriers to Discharge  The pt does not have a guardian and a guardian has not been found for this pt. Nursing homes are requiring a guardian.     Referral Status  locked behavioral unit in nursing home-no new outside referrals were made today      Legal Status  Patient is under MI commitment in [facility]

## 2021-09-23 NOTE — PLAN OF CARE
Night Shift Summary (9/22/21 into 09/23/21)    Pt in bed sleeping at start of shift. Breathing quiet and unlabored. Appears to have slept 6.5 hours.    Assault and Elopement precautions in addition to Single Room order; any related events noted above.     Will continue to monitor and assess.       Problem: Behavioral Health Plan of Care  Goal: Absence of New-Onset Illness or Injury  Outcome: Improving     Problem: Sleep Disturbance  Goal: Adequate Sleep/Rest  Outcome: Improving

## 2021-09-23 NOTE — PLAN OF CARE
Problem: Behavioral Health Plan of Care  Goal: Plan of Care Review  Recent Flowsheet Documentation  Taken 9/23/2021 1200 by Lynn Singh RN  Plan of Care Reviewed With: patient     Patient is observed in the milieu watching TV.  Patient did not attend group and did not have any phone calls.  Patient affect is flat and blunted.  Has rambling speech. Thoughts are confused.  Patient is calm and cooperative.  Patient is compliant with medications.  Appetite is good, sleep is good.  Patient is observed for suicidal and self injurious behaviors.  Patient has not shown any unsafe behaviors.  Continue with plan of care.

## 2021-09-23 NOTE — PROGRESS NOTES
"[facility], [place]   Psychiatric Progress Note  Hospital Day: 85        Interim History:   The patient's care was discussed with the treatment team during the daily team meeting and/or staff's chart notes were reviewed.  Staff report patient continues to be visible in milieu, often sitting in chair in lounge, keeps to self, observed responding to internal stimuli, taking medications as prescribed, no acute events overnight.     Upon interview pt had just finished breakfast, reports appetite is good, mood is \"fine\", he denies SI or HI, denies VH, reports ongoing AH that are not bothersome. Denies side effects to medications, again states he is ready to be discharged, believes he is at [facility]. He then requested interview be terminated and walked into his bathroom.          Medications:       aspirin  81 mg Oral Daily     atorvastatin  80 mg Oral At Bedtime     benztropine  1 mg Oral BID     cholecalciferol  1,250 mcg Oral Q7 Days     cloZAPine  300 mg Oral At Bedtime     docusate sodium  100 mg Oral BID     guaiFENesin  600 mg Oral BID     levothyroxine  88 mcg Oral Daily     memantine  10 mg Oral BID     metoprolol tartrate  25 mg Oral BID     polyethylene glycol  17 g Oral Daily     rivastigmine  1 patch Transdermal Daily     rivastigmine   Transdermal Q8H     salmeterol  1 puff Inhalation BID     vitamin D3  50 mcg Oral Daily          Allergies:     Allergies   Allergen Reactions     Ibuprofen      Latex           Labs:     No results found for this or any previous visit (from the past 48 hour(s)).       Psychiatric Examination:     /87   Pulse 89   Temp 98  F (36.7  C) (Oral)   Resp 16   Ht 1.829 m (6')   Wt 89.1 kg (196 lb 6.4 oz)   SpO2 98%   BMI 26.64 kg/m    Weight is 196 lbs 6.4 oz  Body mass index is 26.64 kg/m .    Orthostatic Vitals       Most Recent      Sitting Orthostatic /84 09/23 0838    Sitting Orthostatic Pulse (bpm) 90 09/23 0838    Standing " "Orthostatic BP --  Comment: pt couldn't stay standing  09/23 0838    Standing Orthostatic Pulse (bpm) 87 09/22 0900        Appearance: awake, alert and untidy  Attitude: somewhat cooperative  Eye Contact:  fair   Mood:  \"fine\" continues to have periods of irritability   Affect:  mood congruent and intensity is flat  Speech:  soft volume, raspy ,dysarthric   Language: fluent and intact in English  Psychomotor, Gait, Musculoskeletal:  no evidence of dystonia, or tics, continues to have movements of buccolingual dyskinesia present during interview  Thought Process: disorganized  Associations:  no loose associations  Thought Content:  denies SI and HI; continues to be delusional and observed responding to internal stimuli during interview, endorses AH, denies VH  Insight:  limited  Judgement:  limited  Oriented to:  person  Attention Span and Concentration:  limited  Recent and Remote Memory:  limited  Fund of Knowledge:  delayed    Clinical Global Impressions  First:  Considering your total clinical experience with this particular patient population, how severe are the patient's symptoms at this time?: 7 (07/01/21 0630)  Compared to the patient's condition at the START of treatment, this patient's condition is: 4 (07/01/21 0630)  Most recent:  Considering your total clinical experience with this particular patient population, how severe are the patient's symptoms at this time?: 6 (09/08/21 0551)  Compared to the patient's condition at the START of treatment, this patient's condition is: 3 (09/08/21 0551)           Precautions:     Behavioral Orders   Procedures     Assault precautions     Code 1     Elopement precautions     Routine Programming     As clinically indicated     Single Room     Status 15     Every 15 minutes.          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   Stimulant use disorder, in remission.   Transaminitis, possibly medication induced   Hx of CVA  Vit D deficiency  Hypertension  COPD  Hx of infective endocarditis with severe mitral and aortic regurgitation s/p biprostehtic valve replacement 2015  Hx of HFrEF now recovered  Tobacco use disorder  Non-severe malnutrition   Urinary incontinence         Assessment & Plan:   Assessment and hospital summary:  The patient is a 54yo male with a history of schizophrenia and TBI and multiple medical co-morbidities as above who was admitted after being transferred from [facility] after a nearly year-long stay. Is currently under commitment with Yanez recently amended to include Clozaril. While at [facility] was noted to have ongoing agitation and frequently attempting to elope from unit requiring 1:1 staffing for safety. He was started on 1:1 staff upon transfer, this was discontinued as patient has been more cooperative without elopement attempts. IM consult completed on admission and continued to follow due to elevated LFTs. Haldol and VPA discontinued due to LFTs. Cross titration completed from risperidone to clozaril after Yanez amended. EPSE/TD movements noted, have appeared stable over past few weeks. Patient has continued to have disorganization and active psychosis symptoms which appear to be at patients baseline. Team continues to work on disposition which barriers include patient has no guardian or next of kin willing to act as surrogate decision maker, see CTC notes for complete details.     Psychiatric treatment/inteventions:  Medications:   -continue Clozaril 300mg at bedtime for psychosis and agitation. Will not adjust dose further at this time, movements consistent with TD noted, and level in therapeutic range, repeat level ordered 9/17 but cancelled by lab, unclear why, re-ordered and processing  -continue benztropine 1mg BID for EPSE  -continue memantine 5mg  daily for neurocognitive disorder  -continue risperdal 1mg Q6H PRN agitation   -continue lorazepam 0.5-1.0mg Q4H PRN anxiety or severe agitation  -continue rivastigmine patch started 8/2 to target neurocognitive disorder     Laboratory/Imaging: weekly WBC and ANC for clozapine monitoring continues ANC 9/17 3.4, clozapine level also added to draw but appears to have been cancelled, have re-ordered and is processing; weekly covid negative 9/20     Patient will be treated in therapeutic milieu with appropriate individual and group therapies as described.     Medical treatment/interventions:  Medical concerns:   1) IM consult placed on admission, per consult note on 7/1/21:   Diagnoses:    #Major neurocognitive disorder dt hx of TBI and alcohol use disorder  #Schizophrenia  #Under commitment  Had been residing in group home prior to admission.  Brought to hospital for evaluation of aggressive behaviors. Group home now unwilling to take him back. Has had numerous CODE 21s called this stay. Seen by psych, meds adjusted. Is currently under civil commitment and court hold through [facility] until 7/31/21. Please see recent discharge summary for details on 6/30/20201.   -Management per psychiatry      #Vitamin D Deficiency- Vit D level 16. Has been in hospital since Sept 2020. Started on cholecalciferol 28517 units on 6/17. Continue high-dose weekly replacement for 6 weeks total. Following high dose replacement recommend starting vitamin D 2000 international unit(s) daily replacement (start date 8/5/2021).      #Possible Tardive Dyskinesia vs EPSE- Per psych assessment on 4/1/21, noted to have possible tardive dyskinesia vs extrapyramidal side effects of meds.  - At that time, recommended to increase Cogentin to 1mg BID and add vitamin E 800 international unit(s) daily.      #Hx of CVA - hx of basal ganglia stroke in 2015. No focal neuro deficits aside from cognitive dysfunction as above.      #Hyperlipidemia - Continue  PTA ASA 81 mg daily and atorvastatin 80 mg daily.      #COPD - Not O2 dependent. Continue PTA on salmeterol and albuterol PRN. Patient occasionally refusing inhalers, encourage compliance. Mucinex added for intermittent cough at [facility].      #Hypothyroidism- TSH 3.18 on 9/2020. Repeat recently on 5/29/2021 slightly elevated 5.18, with no T4 . Continue PTA levothyroxine 88 mcg daily. Repeat TSH with reflex to T4.      #Hx of infective endocarditis with severe mitral and aortic regurgitation S/P bioprosthetic valve replacement (10/2015)  #Hx of HFrEF, now recovered, not in acute exacerbation  Follows with Dr. Dockery of [facility] V[facility], last seen in 1/2020. Echocardiogram in 5/2016 with EF 50%, no evidence for prosthetic mitral and aortic valve dysfunction.   - Follow-up with outpatient Cardiology upon dismissal from the hospital (on every 2 year follow-ups).  - Continue ASA 81 mg daily      #Tobacco use disorder- Using nicotine patch and PRN gum.     #Non-severe malnutrition- Nutrition consulted and following at OSH.      Ordered CMP, CBC, and TSH with reflex.  No further recommendations at this time. Please page the on-call SHREE for any intercurrent medical issues which arise.      ADDENDUM: TSH normal. CBC normal. ALT elevated at 174, with unsatisfactory AST. Per chart review, LFTs last checked in December with  and . T bili and alk phos.  Reviewed with pharmacy, and possible medications causing would be Haldol, depakote, and statin. Discussed with psych who will taper haldol and Depakote. Holding statin, will check LDL.  Will repeat LFTs in AM, and check CK. Medicine will follow up on repeat LFTs, and CK.      Edith Alfaro PA-C  Hospitalist Service     2) Per updated progress note by IM 7/4:   A/P:  #Transaminitis - slowly rising LFTs since December. Asymptomatic. Reviewed with pharmacy, and possible medications causing would be Haldol, depakote, and statin. Discussed with psych  who will taper haldol and Depakote. Holding statin, (total cholesterol 91, with LDL 41). Repeat LFTs continue to rise slightly.   -Abdominal US tomorrow (NPO after midnight)  -Hepatitis B surface ab and agn and Hep C ab   -Repeat LFTs in 3 days     Medicine will follow up on Abdominal US, viral hepatitis studies and repeat LFTs.  No further recommendations at this time. Please page the on-call SHREE for any intercurrent medical issues which arise.      Edith Alfaro PA-C  Hospitalist Service     Per progress note 7/12:      Brief Medicine Note     Medicine following peripherally for LFT monitoring.  LFTs today improved:  (182) and ALT 92 (129). T bili and ALP WNL.      Today's vital signs, medications, and nursing notes were reviewed.       /77 (BP Location: Left arm)   Pulse 110   Temp 97.8  F (36.6  C) (Oral)   Resp 16   Ht 1.829 m (6')   Wt 87.5 kg (192 lb 14.4 oz)   SpO2 95%   BMI 26.16 kg/m       Continue current plan of care. Will continue to trend CMP In 3 days to ensure continued downtrend.      Kim Harman PA-C  Hospitalist Service        3) Urinary incontinence and stool incontinence: RN staff first reported urinary incontinence over weekend 7/9, per chart review patient has history of intermittent urinary incontinence going back to at least 2016, continues to have intermittent episodes of incontinence, continues to have both urinary and stool incontinence, placed IM consult for further assessment of need for additional work up given pts limitations as historian, appreciate assistance, no abnormalities noted on evaluation including bladder scan. Patient declining to wear incontinence briefs most days, staff continuing to encourage and started behavioral plan 9/10 to provide patient with pizza 1x per week for cooperation with incontinence cares. Pt did not wear briefs evening of 9/14, more agreeable 9/15.     4) Unvaccinated for Covid-19, see interim history of note 8/19 by  "writer, attempted to discuss R/B/A of vaccination with patient, he did not demonstrate capacity, has no next of kin acting as surrogate decision maker, is awaiting guardianship, given patients age and medical co-morbidities the risks of receiving Covid vaccine are outweighed by the benefits, including patient is required to be vaccinated to be considered for discharge to some nursing home facilities which would be a more appropriate milieu and less restrictive environment for patient than locked inpatient psychiatric unit. Dr. Nickerson provided second opinion regarding administration of Covid vaccine on 8.20 and agrees with recommendation.   -Covid vaccine order placed 8/23, J&J vaccine given 8/24    5) On 9/17 Pt reported pain in \"heart and arms\", discussed with IM who evaluated patient given significant cardiac history, EKG completed and patient reported to IM he  was no longer having any pain/discomfort, see IM progress note 9/17 for complete details, agree with assessment and plan as outlined by IM    This note was created by undersigned using a Dragon dictation system. All typing errors or contextual distortion are unintentional and software inherent.     Disposition Plan   Reason for ongoing admission: is unable to care for self due to severe psychosis or mariusz and neurocognitive disorder   Discharge location: TBD, likely locked NH facility , unable to progress on discharge planning due to guardianship not being in place, see CTC notes  Discharge Medications: not ordered  Follow-up Appointments: not scheduled  Legal Status: Full MI commitment and Yanez     Entered by: Jeanette Dobbins on 9/23/2021 at 6:41 AM              "

## 2021-09-24 LAB
BASOPHILS # BLD AUTO: 0.1 10E3/UL (ref 0–0.2)
BASOPHILS NFR BLD AUTO: 1 %
EOSINOPHIL # BLD AUTO: 0.9 10E3/UL (ref 0–0.7)
EOSINOPHIL NFR BLD AUTO: 14 %
ERYTHROCYTE [DISTWIDTH] IN BLOOD BY AUTOMATED COUNT: 12.9 % (ref 10–15)
HCT VFR BLD AUTO: 45.3 % (ref 40–53)
HGB BLD-MCNC: 15.5 G/DL (ref 13.3–17.7)
HOLD SPECIMEN: NORMAL
IMM GRANULOCYTES # BLD: 0 10E3/UL
IMM GRANULOCYTES NFR BLD: 0 %
LYMPHOCYTES # BLD AUTO: 1.4 10E3/UL (ref 0.8–5.3)
LYMPHOCYTES NFR BLD AUTO: 21 %
MCH RBC QN AUTO: 32 PG (ref 26.5–33)
MCHC RBC AUTO-ENTMCNC: 34.2 G/DL (ref 31.5–36.5)
MCV RBC AUTO: 94 FL (ref 78–100)
MONOCYTES # BLD AUTO: 0.3 10E3/UL (ref 0–1.3)
MONOCYTES NFR BLD AUTO: 5 %
NEUTROPHILS # BLD AUTO: 3.9 10E3/UL (ref 1.6–8.3)
NEUTROPHILS NFR BLD AUTO: 59 %
NRBC # BLD AUTO: 0 10E3/UL
NRBC BLD AUTO-RTO: 0 /100
PLATELET # BLD AUTO: 194 10E3/UL (ref 150–450)
RBC # BLD AUTO: 4.84 10E6/UL (ref 4.4–5.9)
WBC # BLD AUTO: 6.5 10E3/UL (ref 4–11)

## 2021-09-24 PROCEDURE — 250N000013 HC RX MED GY IP 250 OP 250 PS 637: Performed by: PSYCHIATRY & NEUROLOGY

## 2021-09-24 PROCEDURE — 99232 SBSQ HOSP IP/OBS MODERATE 35: CPT | Performed by: PSYCHIATRY & NEUROLOGY

## 2021-09-24 PROCEDURE — 124N000002 HC R&B MH UMMC

## 2021-09-24 PROCEDURE — 250N000013 HC RX MED GY IP 250 OP 250 PS 637: Performed by: PHYSICIAN ASSISTANT

## 2021-09-24 PROCEDURE — 36415 COLL VENOUS BLD VENIPUNCTURE: CPT | Performed by: PSYCHIATRY & NEUROLOGY

## 2021-09-24 PROCEDURE — 85025 COMPLETE CBC W/AUTO DIFF WBC: CPT | Performed by: PSYCHIATRY & NEUROLOGY

## 2021-09-24 RX ADMIN — GUAIFENESIN 600 MG: 600 TABLET, EXTENDED RELEASE ORAL at 21:12

## 2021-09-24 RX ADMIN — ATORVASTATIN CALCIUM 80 MG: 80 TABLET, FILM COATED ORAL at 21:12

## 2021-09-24 RX ADMIN — DOCUSATE SODIUM 100 MG: 100 CAPSULE, LIQUID FILLED ORAL at 21:12

## 2021-09-24 RX ADMIN — SALMETEROL XINAFOATE 1 PUFF: 50 POWDER, METERED ORAL; RESPIRATORY (INHALATION) at 21:11

## 2021-09-24 RX ADMIN — CLOZAPINE 350 MG: 100 TABLET ORAL at 21:11

## 2021-09-24 RX ADMIN — POLYETHYLENE GLYCOL 3350 17 G: 17 POWDER, FOR SOLUTION ORAL at 08:32

## 2021-09-24 RX ADMIN — LEVOTHYROXINE SODIUM 88 MCG: 88 TABLET ORAL at 08:32

## 2021-09-24 RX ADMIN — RIVASTIGMINE 1 PATCH: 4.6 PATCH TRANSDERMAL at 08:32

## 2021-09-24 RX ADMIN — MEMANTINE 10 MG: 5 TABLET ORAL at 08:32

## 2021-09-24 RX ADMIN — Medication 50 MCG: at 08:32

## 2021-09-24 RX ADMIN — MEMANTINE 10 MG: 5 TABLET ORAL at 21:12

## 2021-09-24 RX ADMIN — BENZTROPINE MESYLATE 1 MG: 1 TABLET ORAL at 08:32

## 2021-09-24 RX ADMIN — METOPROLOL TARTRATE 25 MG: 25 TABLET, FILM COATED ORAL at 21:12

## 2021-09-24 RX ADMIN — BENZTROPINE MESYLATE 1 MG: 1 TABLET ORAL at 21:17

## 2021-09-24 RX ADMIN — METOPROLOL TARTRATE 25 MG: 25 TABLET, FILM COATED ORAL at 08:32

## 2021-09-24 RX ADMIN — GUAIFENESIN 600 MG: 600 TABLET, EXTENDED RELEASE ORAL at 08:32

## 2021-09-24 RX ADMIN — ASPIRIN 81 MG CHEWABLE TABLET 81 MG: 81 TABLET CHEWABLE at 08:32

## 2021-09-24 RX ADMIN — SALMETEROL XINAFOATE 1 PUFF: 50 POWDER, METERED ORAL; RESPIRATORY (INHALATION) at 08:38

## 2021-09-24 RX ADMIN — DOCUSATE SODIUM 100 MG: 100 CAPSULE, LIQUID FILLED ORAL at 08:32

## 2021-09-24 ASSESSMENT — ACTIVITIES OF DAILY LIVING (ADL)
DRESS: INDEPENDENT
LAUNDRY: UNABLE TO COMPLETE
HYGIENE/GROOMING: PROMPTS
ORAL_HYGIENE: PROMPTS

## 2021-09-24 NOTE — PLAN OF CARE
Assessment/Intervention/Current Symtoms and Care Coordination  -Chart review  -Pre round meeting with team  -Rounded with team, addressed patient needs/concerns  -Post round meeting with team  Current Symptoms include the following: Psychosis, disorganization and confusion  Completed team note     Discharge Plan or Goal  Pending stabilization & development of a safe discharge plan.  Considerations include: locked behavorial unit in nursing home     Barriers to Discharge  The pt does not have a guardian and a guardian has not been found for this pt. Nursing homes are requiring a guardian.     Referral Status  locked behavioral unit in nursing home-no new outside referrals were made today      Legal Status  Patient is under MI commitment in [facility]

## 2021-09-24 NOTE — PLAN OF CARE
Patient visible in milieu. Sits quietly by himself and watches TV or just sits. Appetite good. Patient is disheveled and encouraged to shower, but patient declined. Patient mumbles, difficult to understand.

## 2021-09-24 NOTE — PLAN OF CARE
BEHAVIORAL TEAM DISCUSSION    Participants: Bárbara Gomez DO RN, crys Osorio [facility]  Progress: Patient continues to await guardianship.  Anticipated length of stay: Unknown at this time  Continued Stay Criteria/Rationale: No safe discharge placement available until guardianship is found - pt will be referred to locked secure unit at that time  Medical/Physical: Wearing incontinence briefs  Precautions:   Behavioral Orders   Procedures    Assault precautions    Code 1    Elopement precautions    Routine Programming     As clinically indicated    Single Room    Status 15     Every 15 minutes.     Plan: Hospital to find guardian: pt to remain hospitalized until guardianship is found - pt will be referred to locked secure unit at that time  Medical/Physic  Rationale for change in precautions or plan: N/A

## 2021-09-24 NOTE — PROGRESS NOTES
"[facility], [place]   Psychiatric Progress Note  Hospital Day: 86        Interim History:   The patient's care was discussed with the treatment team during the daily team meeting and/or staff's chart notes were reviewed.  Staff report patient has been visible in the milieu but keeps to self, continues to respond to internal stimuli, taking medications as prescribed, behaviorally controlled, no acute events overnight.     Upon interview pt was lying in his room resting on bed but awake. Mood is \"tired\" reports difficulty sleeping last night due to \"all the people entering [his] head\", endorses AH, denies they are command in nature. Denies VH. Denies SI or HI. Tolerating medications, continues to not notice TD movements, he asked about having a cigarette and informed he cannot smoke in hospital. Denied having any physical discomfort. No additional concerns.          Medications:       aspirin  81 mg Oral Daily     atorvastatin  80 mg Oral At Bedtime     benztropine  1 mg Oral BID     cholecalciferol  1,250 mcg Oral Q7 Days     cloZAPine  300 mg Oral At Bedtime     docusate sodium  100 mg Oral BID     guaiFENesin  600 mg Oral BID     levothyroxine  88 mcg Oral Daily     memantine  10 mg Oral BID     metoprolol tartrate  25 mg Oral BID     polyethylene glycol  17 g Oral Daily     rivastigmine  1 patch Transdermal Daily     rivastigmine   Transdermal Q8H     salmeterol  1 puff Inhalation BID     vitamin D3  50 mcg Oral Daily          Allergies:     Allergies   Allergen Reactions     Ibuprofen      Latex           Labs:     No results found for this or any previous visit (from the past 48 hour(s)).       Psychiatric Examination:     /88   Pulse 91   Temp 98.2  F (36.8  C) (Oral)   Resp 16   Ht 1.829 m (6')   Wt 89.1 kg (196 lb 8 oz)   SpO2 97%   BMI 26.65 kg/m    Weight is 196 lbs 8 oz  Body mass index is 26.65 kg/m .    Orthostatic Vitals       Most Recent      Sitting " "Orthostatic /84 09/23 0838    Sitting Orthostatic Pulse (bpm) 90 09/23 0838    Standing Orthostatic BP --  Comment: pt couldn't stay standing  09/23 0838        Appearance: awake, alert and untidy  Attitude: somewhat cooperative  Eye Contact:  poor, often looking away from writer  Mood:  \"tired\" continues to have periods of irritability   Affect:  mood congruent and intensity is flat  Speech:  soft volume, raspy ,dysarthric   Language: fluent and intact in English  Psychomotor, Gait, Musculoskeletal:  no evidence of dystonia, or tics, continues to have movements of buccolingual dyskinesia present during interview  Thought Process: disorganized  Associations:  no loose associations  Thought Content:  denies SI and HI; continues to be delusional and observed responding to internal stimuli during interview, endorses AH, denies VH  Insight:  limited  Judgement:  limited  Oriented to:  person  Attention Span and Concentration:  limited  Recent and Remote Memory:  limited  Fund of Knowledge:  delayed    Clinical Global Impressions  First:  Considering your total clinical experience with this particular patient population, how severe are the patient's symptoms at this time?: 7 (07/01/21 0630)  Compared to the patient's condition at the START of treatment, this patient's condition is: 4 (07/01/21 0630)  Most recent:  Considering your total clinical experience with this particular patient population, how severe are the patient's symptoms at this time?: 6 (09/24/21 1727)  Compared to the patient's condition at the START of treatment, this patient's condition is: 3 (09/24/21 1727)         Precautions:     Behavioral Orders   Procedures     Assault precautions     Code 1     Elopement precautions     Routine Programming     As clinically indicated     Single Room     Status 15     Every 15 minutes.          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   Stimulant use disorder, in remission.   Transaminitis, possibly medication induced   Hx of CVA  Vit D deficiency  Hypertension  COPD  Hx of infective endocarditis with severe mitral and aortic regurgitation s/p biprostehtic valve replacement 2015  Hx of HFrEF now recovered  Tobacco use disorder  Non-severe malnutrition   Urinary incontinence         Assessment & Plan:   Assessment and hospital summary:  The patient is a 54yo male with a history of schizophrenia and TBI and multiple medical co-morbidities as above who was admitted after being transferred from [facility] after a nearly year-long stay. Is currently under commitment with Yanez recently amended to include Clozaril. While at [facility] was noted to have ongoing agitation and frequently attempting to elope from unit requiring 1:1 staffing for safety. He was started on 1:1 staff upon transfer, this was discontinued as patient has been more cooperative without elopement attempts. IM consult completed on admission and continued to follow due to elevated LFTs. Haldol and VPA discontinued due to LFTs. Cross titration completed from risperidone to clozaril after Yanez amended. EPSE/TD movements noted, have appeared stable over past few weeks. Patient has continued to have disorganization and active psychosis symptoms which appear to be at patients baseline. Team continues to work on disposition which barriers include patient has no guardian or next of kin willing to act as surrogate decision maker, see [facility] notes for complete details.     Psychiatric treatment/inteventions:  Medications:   -increase Clozaril to 350mg at bedtime for psychosis and agitation, recent level was lower than previous at 389 on 9/20, continue to monitor movements consistent with TD  -continue benztropine 1mg BID for EPSE  -continue memantine 5mg daily for  neurocognitive disorder  -continue risperdal 1mg Q6H PRN agitation   -continue lorazepam 0.5-1.0mg Q4H PRN anxiety or severe agitation  -continue rivastigmine patch started 8/2 to target neurocognitive disorder     Laboratory/Imaging: weekly WBC and ANC for clozapine monitoring continues ANC 9/24 3.9, clozapine level 9/20 was 389;  weekly covid negative 9/20     Patient will be treated in therapeutic milieu with appropriate individual and group therapies as described.     Medical treatment/interventions:  Medical concerns:   1) IM consult placed on admission, per consult note on 7/1/21:   Diagnoses:    #Major neurocognitive disorder dt hx of TBI and alcohol use disorder  #Schizophrenia  #Under commitment  Had been residing in group home prior to admission.  Brought to hospital for evaluation of aggressive behaviors. Group home now unwilling to take him back. Has had numerous CODE 21s called this stay. Seen by psych, meds adjusted. Is currently under civil commitment and court hold through [facility] until 7/31/21. Please see recent discharge summary for details on 6/30/20201.   -Management per psychiatry      #Vitamin D Deficiency- Vit D level 16. Has been in hospital since Sept 2020. Started on cholecalciferol 03880 units on 6/17. Continue high-dose weekly replacement for 6 weeks total. Following high dose replacement recommend starting vitamin D 2000 international unit(s) daily replacement (start date 8/5/2021).      #Possible Tardive Dyskinesia vs EPSE- Per psych assessment on 4/1/21, noted to have possible tardive dyskinesia vs extrapyramidal side effects of meds.  - At that time, recommended to increase Cogentin to 1mg BID and add vitamin E 800 international unit(s) daily.      #Hx of CVA - hx of basal ganglia stroke in 2015. No focal neuro deficits aside from cognitive dysfunction as above.      #Hyperlipidemia - Continue PTA ASA 81 mg daily and atorvastatin 80 mg daily.      #COPD - Not O2 dependent.  Continue PTA on salmeterol and albuterol PRN. Patient occasionally refusing inhalers, encourage compliance. Mucinex added for intermittent cough at [facility].      #Hypothyroidism- TSH 3.18 on 9/2020. Repeat recently on 5/29/2021 slightly elevated 5.18, with no T4 . Continue PTA levothyroxine 88 mcg daily. Repeat TSH with reflex to T4.      #Hx of infective endocarditis with severe mitral and aortic regurgitation S/P bioprosthetic valve replacement (10/2015)  #Hx of HFrEF, now recovered, not in acute exacerbation  Follows with Dr. Dockery of [facility] V[facility] Card[facility], last seen in 1/2020. Echocardiogram in 5/2016 with EF 50%, no evidence for prosthetic mitral and aortic valve dysfunction.   - Follow-up with outpatient Cardiology upon dismissal from the hospital (on every 2 year follow-ups).  - Continue ASA 81 mg daily      #Tobacco use disorder- Using nicotine patch and PRN gum.     #Non-severe malnutrition- Nutrition consulted and following at OSH.      Ordered CMP, CBC, and TSH with reflex.  No further recommendations at this time. Please page the on-call SHREE for any intercurrent medical issues which arise.      ADDENDUM: TSH normal. CBC normal. ALT elevated at 174, with unsatisfactory AST. Per chart review, LFTs last checked in December with  and . T bili and alk phos.  Reviewed with pharmacy, and possible medications causing would be Haldol, depakote, and statin. Discussed with psych who will taper haldol and Depakote. Holding statin, will check LDL.  Will repeat LFTs in AM, and check CK. Medicine will follow up on repeat LFTs, and CK.      Edith Alfaro PA-C  Hospitalist Service     2) Per updated progress note by IM 7/4:   A/P:  #Transaminitis - slowly rising LFTs since December. Asymptomatic. Reviewed with pharmacy, and possible medications causing would be Haldol, depakote, and statin. Discussed with psych who will taper haldol and Depakote. Holding statin, (total cholesterol 91, with  LDL 41). Repeat LFTs continue to rise slightly.   -Abdominal US tomorrow (NPO after midnight)  -Hepatitis B surface ab and agn and Hep C ab   -Repeat LFTs in 3 days     Medicine will follow up on Abdominal US, viral hepatitis studies and repeat LFTs.  No further recommendations at this time. Please page the on-call SHREE for any intercurrent medical issues which arise.      Edith Alfaro PA-C  Hospitalist Service     Per progress note 7/12:      Brief Medicine Note     Medicine following peripherally for LFT monitoring.  LFTs today improved:  (182) and ALT 92 (129). T bili and ALP WNL.      Today's vital signs, medications, and nursing notes were reviewed.       /77 (BP Location: Left arm)   Pulse 110   Temp 97.8  F (36.6  C) (Oral)   Resp 16   Ht 1.829 m (6')   Wt 87.5 kg (192 lb 14.4 oz)   SpO2 95%   BMI 26.16 kg/m       Continue current plan of care. Will continue to trend CMP In 3 days to ensure continued downtrend.      Kim Harman PA-C  Hospitalist Service        3) Urinary incontinence and stool incontinence: RN staff first reported urinary incontinence over weekend 7/9, per chart review patient has history of intermittent urinary incontinence going back to at least 2016, continues to have intermittent episodes of incontinence, continues to have both urinary and stool incontinence, placed IM consult for further assessment of need for additional work up given pts limitations as historian, appreciate assistance, no abnormalities noted on evaluation including bladder scan. Patient declining to wear incontinence briefs most days, staff continuing to encourage and started behavioral plan 9/10 to provide patient with pizza 1x per week for cooperation with incontinence cares. Pt did not wear briefs evening of 9/14, more agreeable 9/15.     4) Unvaccinated for Covid-19, see interim history of note 8/19 by writer, attempted to discuss R/B/A of vaccination with patient, he did not  "demonstrate capacity, has no next of kin acting as surrogate decision maker, is awaiting guardianship, given patients age and medical co-morbidities the risks of receiving Covid vaccine are outweighed by the benefits, including patient is required to be vaccinated to be considered for discharge to some nursing home facilities which would be a more appropriate milieu and less restrictive environment for patient than locked inpatient psychiatric unit. Dr. Nickerson provided second opinion regarding administration of Covid vaccine on 8.20 and agrees with recommendation.   -Covid vaccine order placed 8/23, J&J vaccine given 8/24    5) On 9/17 Pt reported pain in \"heart and arms\", discussed with IM who evaluated patient given significant cardiac history, EKG completed and patient reported to IM he  was no longer having any pain/discomfort, see IM progress note 9/17 for complete details, agree with assessment and plan as outlined by IM    This note was created by undersigned using a Dragon dictation system. All typing errors or contextual distortion are unintentional and software inherent.     Disposition Plan   Reason for ongoing admission: is unable to care for self due to severe psychosis or mariusz and neurocognitive disorder   Discharge location: TBD, likely locked NH facility , unable to progress on discharge planning due to guardianship not being in place, see [facility] notes  Discharge Medications: not ordered  Follow-up Appointments: not scheduled  Legal Status: Full MI commitment and Yanez     Entered by: Jeanette Dobbins on 9/24/2021 at 6:59 AM              "

## 2021-09-24 NOTE — PLAN OF CARE
"Patient has been calm and cooperative all evening, was out in the milieu watching television with peers, no complain of pain or discomfort. Ate 100% of meal during dinner. At times patient was observed sitting in a chair in the milieu talking to himself and just stirring blankly. Patient denied anxiety and depression but had blunted and flat affect. Patient refuse for writer to check his skin for the [employer] patch stated\" I don't have any patch on skin\". Patient check both his shoulder and stated\" No patch on my shoulder\". In bed resting comfortably.    "

## 2021-09-25 PROCEDURE — 250N000013 HC RX MED GY IP 250 OP 250 PS 637: Performed by: PSYCHIATRY & NEUROLOGY

## 2021-09-25 PROCEDURE — 250N000013 HC RX MED GY IP 250 OP 250 PS 637: Performed by: PHYSICIAN ASSISTANT

## 2021-09-25 PROCEDURE — 124N000002 HC R&B MH UMMC

## 2021-09-25 RX ADMIN — GUAIFENESIN 600 MG: 600 TABLET, EXTENDED RELEASE ORAL at 20:10

## 2021-09-25 RX ADMIN — Medication 50 MCG: at 08:54

## 2021-09-25 RX ADMIN — RIVASTIGMINE 1 PATCH: 4.6 PATCH TRANSDERMAL at 08:55

## 2021-09-25 RX ADMIN — MEMANTINE 10 MG: 5 TABLET ORAL at 20:10

## 2021-09-25 RX ADMIN — CLOZAPINE 350 MG: 100 TABLET ORAL at 20:10

## 2021-09-25 RX ADMIN — DOCUSATE SODIUM 100 MG: 100 CAPSULE, LIQUID FILLED ORAL at 20:10

## 2021-09-25 RX ADMIN — ATORVASTATIN CALCIUM 80 MG: 80 TABLET, FILM COATED ORAL at 20:10

## 2021-09-25 RX ADMIN — SALMETEROL XINAFOATE 1 PUFF: 50 POWDER, METERED ORAL; RESPIRATORY (INHALATION) at 20:09

## 2021-09-25 RX ADMIN — GUAIFENESIN 600 MG: 600 TABLET, EXTENDED RELEASE ORAL at 08:55

## 2021-09-25 RX ADMIN — BENZTROPINE MESYLATE 1 MG: 1 TABLET ORAL at 08:54

## 2021-09-25 RX ADMIN — POLYETHYLENE GLYCOL 3350 17 G: 17 POWDER, FOR SOLUTION ORAL at 08:55

## 2021-09-25 RX ADMIN — MEMANTINE 10 MG: 5 TABLET ORAL at 08:54

## 2021-09-25 RX ADMIN — METOPROLOL TARTRATE 25 MG: 25 TABLET, FILM COATED ORAL at 08:54

## 2021-09-25 RX ADMIN — BENZTROPINE MESYLATE 1 MG: 1 TABLET ORAL at 20:10

## 2021-09-25 RX ADMIN — SALMETEROL XINAFOATE 1 PUFF: 50 POWDER, METERED ORAL; RESPIRATORY (INHALATION) at 08:56

## 2021-09-25 RX ADMIN — LEVOTHYROXINE SODIUM 88 MCG: 88 TABLET ORAL at 08:54

## 2021-09-25 RX ADMIN — METOPROLOL TARTRATE 25 MG: 25 TABLET, FILM COATED ORAL at 20:10

## 2021-09-25 RX ADMIN — ASPIRIN 81 MG CHEWABLE TABLET 81 MG: 81 TABLET CHEWABLE at 08:54

## 2021-09-25 RX ADMIN — DOCUSATE SODIUM 100 MG: 100 CAPSULE, LIQUID FILLED ORAL at 08:55

## 2021-09-25 ASSESSMENT — ACTIVITIES OF DAILY LIVING (ADL)
ORAL_HYGIENE: PROMPTS
HYGIENE/GROOMING: PROMPTS
DRESS: INDEPENDENT
LAUNDRY: UNABLE TO COMPLETE

## 2021-09-25 NOTE — PLAN OF CARE
In and out of room watching TV, eating well. Had one short outburst of yelling when watching TV. Appears to be responding to voices. Disheveled. Turning down shower.

## 2021-09-25 NOTE — PLAN OF CARE
Pt slept most of the night. He woke about 5am for the bathroom and came out for water then returned to bed. No concerns identified.   Precautions maintained.     Sleep hours: 6.5 hours

## 2021-09-25 NOTE — PLAN OF CARE
Pt has spent more time in his room this evening, but only napping briefly. [facility] when invited to [facility] a [facility].   Patient denies SI. Did not answer questions about his hallucinations, but continues to appear to be responding.   He has been cooperative with cares and was informed of the increase dose of clozaril. He denied questions or concerns.

## 2021-09-26 PROCEDURE — 250N000013 HC RX MED GY IP 250 OP 250 PS 637: Performed by: PHYSICIAN ASSISTANT

## 2021-09-26 PROCEDURE — 250N000013 HC RX MED GY IP 250 OP 250 PS 637: Performed by: PSYCHIATRY & NEUROLOGY

## 2021-09-26 PROCEDURE — 124N000002 HC R&B MH UMMC

## 2021-09-26 RX ADMIN — ATORVASTATIN CALCIUM 80 MG: 80 TABLET, FILM COATED ORAL at 20:28

## 2021-09-26 RX ADMIN — DOCUSATE SODIUM 100 MG: 100 CAPSULE, LIQUID FILLED ORAL at 20:28

## 2021-09-26 RX ADMIN — LEVOTHYROXINE SODIUM 88 MCG: 88 TABLET ORAL at 08:06

## 2021-09-26 RX ADMIN — GUAIFENESIN 600 MG: 600 TABLET, EXTENDED RELEASE ORAL at 20:28

## 2021-09-26 RX ADMIN — SALMETEROL XINAFOATE 1 PUFF: 50 POWDER, METERED ORAL; RESPIRATORY (INHALATION) at 20:28

## 2021-09-26 RX ADMIN — POLYETHYLENE GLYCOL 3350 17 G: 17 POWDER, FOR SOLUTION ORAL at 08:06

## 2021-09-26 RX ADMIN — CLOZAPINE 350 MG: 100 TABLET ORAL at 20:28

## 2021-09-26 RX ADMIN — METOPROLOL TARTRATE 25 MG: 25 TABLET, FILM COATED ORAL at 08:06

## 2021-09-26 RX ADMIN — BENZTROPINE MESYLATE 1 MG: 1 TABLET ORAL at 08:06

## 2021-09-26 RX ADMIN — ASPIRIN 81 MG CHEWABLE TABLET 81 MG: 81 TABLET CHEWABLE at 08:06

## 2021-09-26 RX ADMIN — BENZTROPINE MESYLATE 1 MG: 1 TABLET ORAL at 20:28

## 2021-09-26 RX ADMIN — Medication 50 MCG: at 08:06

## 2021-09-26 RX ADMIN — MEMANTINE 10 MG: 5 TABLET ORAL at 20:28

## 2021-09-26 RX ADMIN — RISPERIDONE 1 MG: 1 TABLET, ORALLY DISINTEGRATING ORAL at 18:00

## 2021-09-26 RX ADMIN — SALMETEROL XINAFOATE 1 PUFF: 50 POWDER, METERED ORAL; RESPIRATORY (INHALATION) at 08:07

## 2021-09-26 RX ADMIN — MEMANTINE 10 MG: 5 TABLET ORAL at 08:06

## 2021-09-26 RX ADMIN — GUAIFENESIN 600 MG: 600 TABLET, EXTENDED RELEASE ORAL at 08:06

## 2021-09-26 RX ADMIN — METOPROLOL TARTRATE 25 MG: 25 TABLET, FILM COATED ORAL at 20:28

## 2021-09-26 RX ADMIN — DOCUSATE SODIUM 100 MG: 100 CAPSULE, LIQUID FILLED ORAL at 08:06

## 2021-09-26 RX ADMIN — RIVASTIGMINE 1 PATCH: 4.6 PATCH TRANSDERMAL at 08:06

## 2021-09-26 ASSESSMENT — ACTIVITIES OF DAILY LIVING (ADL)
ORAL_HYGIENE: PROMPTS
DRESS: INDEPENDENT
LAUNDRY: UNABLE TO COMPLETE
HYGIENE/GROOMING: PROMPTS

## 2021-09-26 NOTE — PLAN OF CARE
Night Shift Summary (9/25/21 into 09/26/21)    Pt in bed sleeping at start of shift. Breathing quiet and unlabored. Appears to have slept 6.75 hours.    Assault and Elopement precautions in addition to Single Room order; any related events noted above.     Will continue to monitor and assess.       Problem: Behavioral Health Plan of Care  Goal: Absence of New-Onset Illness or Injury  Outcome: Improving     Problem: Sleep Disturbance  Goal: Adequate Sleep/Rest  Outcome: Improving

## 2021-09-26 NOTE — PLAN OF CARE
Pt was irritable this evening and seemed disturbed by internal stimuli. He would not respond to questions about AH, but was cooperative with cares and staff direction.   He was out of his room more this evening than yesterday.   Did not attend group. No interaction with peers.   Declined shower.   Denies SI. Denied anxiety.

## 2021-09-27 PROCEDURE — 99233 SBSQ HOSP IP/OBS HIGH 50: CPT | Performed by: PSYCHIATRY & NEUROLOGY

## 2021-09-27 PROCEDURE — 250N000013 HC RX MED GY IP 250 OP 250 PS 637: Performed by: PSYCHIATRY & NEUROLOGY

## 2021-09-27 PROCEDURE — 250N000013 HC RX MED GY IP 250 OP 250 PS 637: Performed by: PHYSICIAN ASSISTANT

## 2021-09-27 PROCEDURE — 124N000002 HC R&B MH UMMC

## 2021-09-27 RX ORDER — CLOZAPINE 50 MG/1
50 TABLET ORAL DAILY
Status: DISCONTINUED | OUTPATIENT
Start: 2021-09-27 | End: 2021-10-14

## 2021-09-27 RX ORDER — CLOZAPINE 100 MG/1
300 TABLET ORAL AT BEDTIME
Status: DISCONTINUED | OUTPATIENT
Start: 2021-09-27 | End: 2021-10-14

## 2021-09-27 RX ADMIN — POLYETHYLENE GLYCOL 3350 17 G: 17 POWDER, FOR SOLUTION ORAL at 09:19

## 2021-09-27 RX ADMIN — GUAIFENESIN 600 MG: 600 TABLET, EXTENDED RELEASE ORAL at 09:18

## 2021-09-27 RX ADMIN — METOPROLOL TARTRATE 25 MG: 25 TABLET, FILM COATED ORAL at 09:18

## 2021-09-27 RX ADMIN — SALMETEROL XINAFOATE 1 PUFF: 50 POWDER, METERED ORAL; RESPIRATORY (INHALATION) at 20:32

## 2021-09-27 RX ADMIN — CLOZAPINE 50 MG: 50 TABLET ORAL at 10:28

## 2021-09-27 RX ADMIN — MEMANTINE 10 MG: 5 TABLET ORAL at 09:18

## 2021-09-27 RX ADMIN — NICOTINE POLACRILEX 2 MG: 2 LOZENGE ORAL at 13:10

## 2021-09-27 RX ADMIN — LEVOTHYROXINE SODIUM 88 MCG: 88 TABLET ORAL at 09:18

## 2021-09-27 RX ADMIN — BENZTROPINE MESYLATE 1 MG: 1 TABLET ORAL at 20:32

## 2021-09-27 RX ADMIN — METOPROLOL TARTRATE 25 MG: 25 TABLET, FILM COATED ORAL at 20:32

## 2021-09-27 RX ADMIN — DOCUSATE SODIUM 100 MG: 100 CAPSULE, LIQUID FILLED ORAL at 09:18

## 2021-09-27 RX ADMIN — BENZTROPINE MESYLATE 1 MG: 1 TABLET ORAL at 09:18

## 2021-09-27 RX ADMIN — SALMETEROL XINAFOATE 1 PUFF: 50 POWDER, METERED ORAL; RESPIRATORY (INHALATION) at 09:18

## 2021-09-27 RX ADMIN — RIVASTIGMINE 1 PATCH: 4.6 PATCH TRANSDERMAL at 09:19

## 2021-09-27 RX ADMIN — DOCUSATE SODIUM 100 MG: 100 CAPSULE, LIQUID FILLED ORAL at 20:32

## 2021-09-27 RX ADMIN — Medication 50 MCG: at 09:18

## 2021-09-27 RX ADMIN — ASPIRIN 81 MG CHEWABLE TABLET 81 MG: 81 TABLET CHEWABLE at 09:18

## 2021-09-27 RX ADMIN — CLOZAPINE 300 MG: 100 TABLET ORAL at 20:32

## 2021-09-27 RX ADMIN — GUAIFENESIN 600 MG: 600 TABLET, EXTENDED RELEASE ORAL at 20:33

## 2021-09-27 RX ADMIN — ATORVASTATIN CALCIUM 80 MG: 80 TABLET, FILM COATED ORAL at 20:32

## 2021-09-27 RX ADMIN — MEMANTINE 10 MG: 5 TABLET ORAL at 20:32

## 2021-09-27 ASSESSMENT — ACTIVITIES OF DAILY LIVING (ADL)
DRESS: SCRUBS (BEHAVIORAL HEALTH);INDEPENDENT;PROMPTS
HYGIENE/GROOMING: PROMPTS
LAUNDRY: WITH SUPERVISION
DRESS: INDEPENDENT
ORAL_HYGIENE: PROMPTS
ORAL_HYGIENE: PROMPTS
HYGIENE/GROOMING: PROMPTS

## 2021-09-27 NOTE — PROGRESS NOTES
"[facility], [place]   Psychiatric Progress Note  Hospital Day: 89        Interim History:   The patient's care was discussed with the treatment team during the daily team meeting and/or staff's chart notes were reviewed.  Staff report patient has been visible in the milieu but keeps to self, appears to be responding to internal stimuli and was more agitated this weekend than previous days, taking medications as prescribed and received some PRN medication, declining to shower, was receptive to some redirection by staff when agitated, no acute events over weekend.     Upon interview pt was resting in room following breakfast, was lying in bed, awake, appeared to be responding to internal stimuli, would not make eye contact with writer initially, he reports mood is \"tired\", he is at \"[facility]\". He endorses AH, denies VH. Reports voices bother him in that they keep him awake. Denies SI or HI. Tolerating increased dose in clozapine, denies changes in side effects, continues to deny noticing TD movements. Denies any physical health concerns outside of being cold and requesting more blankets, request was passed along to RN. Also discussed encouraging fluids/water given elevated HR and having pizza ordered for patient per behavioral plan for wearing incontinence briefs.          Medications:       aspirin  81 mg Oral Daily     atorvastatin  80 mg Oral At Bedtime     benztropine  1 mg Oral BID     cholecalciferol  1,250 mcg Oral Q7 Days     cloZAPine  350 mg Oral At Bedtime     docusate sodium  100 mg Oral BID     guaiFENesin  600 mg Oral BID     levothyroxine  88 mcg Oral Daily     memantine  10 mg Oral BID     metoprolol tartrate  25 mg Oral BID     polyethylene glycol  17 g Oral Daily     rivastigmine  1 patch Transdermal Daily     rivastigmine   Transdermal Q8H     salmeterol  1 puff Inhalation BID     vitamin D3  50 mcg Oral Daily          Allergies:     Allergies " "  Allergen Reactions     Ibuprofen      Latex           Labs:     No results found for this or any previous visit (from the past 48 hour(s)).       Psychiatric Examination:     /79   Pulse 95   Temp 97.9  F (36.6  C) (Oral)   Resp 16   Ht 1.829 m (6')   Wt 89.1 kg (196 lb 8 oz)   SpO2 98%   BMI 26.65 kg/m    Weight is 196 lbs 8 oz  Body mass index is 26.65 kg/m .    Orthostatic Vitals       Most Recent      Sitting Orthostatic /89 09/26 0802    Sitting Orthostatic Pulse (bpm) 100 09/26 0802    Standing Orthostatic /68 09/27 0823    Standing Orthostatic Pulse (bpm) 117 09/27 0823        Appearance: awake, alert and untidy  Attitude: somewhat cooperative  Eye Contact:  poor, often looking away from writer  Mood:  \"tired\" continues to have periods of irritability   Affect:  mood congruent and intensity is flat  Speech:  soft volume, raspy ,dysarthric   Language: fluent and intact in English  Psychomotor, Gait, Musculoskeletal:  no evidence of dystonia, or tics, continues to have movements of buccolingual dyskinesia present during interview  Thought Process: disorganized  Associations:  no loose associations  Thought Content:  denies SI and HI; continues to be delusional and observed responding to internal stimuli during interview, endorses AH, denies VH  Insight:  limited  Judgement:  limited  Oriented to:  person, continues to believe he is a [facility]  Attention Span and Concentration:  limited  Recent and Remote Memory:  limited  Fund of Knowledge:  delayed    Clinical Global Impressions  First:  Considering your total clinical experience with this particular patient population, how severe are the patient's symptoms at this time?: 7 (07/01/21 0630)  Compared to the patient's condition at the START of treatment, this patient's condition is: 4 (07/01/21 0630)  Most recent:  Considering your total clinical experience with this particular patient population, how severe are the patient's symptoms at " this time?: 6 (09/24/21 1727)  Compared to the patient's condition at the START of treatment, this patient's condition is: 3 (09/24/21 1727)         Precautions:     Behavioral Orders   Procedures     Assault precautions     Code 1     Elopement precautions     Routine Programming     As clinically indicated     Single Room     Status 15     Every 15 minutes.          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   Stimulant use disorder, in remission.   Transaminitis, possibly medication induced   Hx of CVA  Vit D deficiency  Hypertension  COPD  Hx of infective endocarditis with severe mitral and aortic regurgitation s/p biprostehtic valve replacement 2015  Hx of HFrEF now recovered  Tobacco use disorder  Non-severe malnutrition   Urinary incontinence         Assessment & Plan:   Assessment and hospital summary:  The patient is a 54yo male with a history of schizophrenia and TBI and multiple medical co-morbidities as above who was admitted after being transferred from [facility] after a nearly year-long stay. Is currently under commitment with Yanez recently amended to include Clozaril. While at [facility] was noted to have ongoing agitation and frequently attempting to elope from unit requiring 1:1 staffing for safety. He was started on 1:1 staff upon transfer, this was discontinued as patient has been more cooperative without elopement attempts. IM consult completed on admission and continued to follow due to elevated LFTs. Haldol and VPA discontinued due to LFTs. Cross titration completed from risperidone to clozaril after Yanez amended. EPSE/TD movements noted, have appeared stable over past few weeks. Patient has continued to have disorganization and active psychosis symptoms which appear to be at patients baseline. Team  continues to work on disposition which barriers include patient has no guardian or next of kin willing to act as surrogate decision maker, see CTC notes for complete details. Patient starting to have increasing symptoms noted week of 9/20, clozapine level ordered and noted to be lower than previous, have added cheeking precautions and increased clozapine dose on 9/24.     Psychiatric treatment/inteventions:  Medications:   -change Clozaril to 50mg in AM and 300mg at bedtime, will trial split dosing to see if patient better tolerates and targets afternoon agitation better to reduce PRN use; recent level was lower than previous at 389 on 9/20, continue to monitor movements consistent with TD  -started cheeking precautions given noted decompensation in recent weeks and lower clozapine level   -continue benztropine 1mg BID for EPSE  -continue memantine 5mg daily for neurocognitive disorder  -continue risperdal 1mg Q6H PRN agitation   -continue lorazepam 0.5-1.0mg Q4H PRN anxiety or severe agitation  -continue rivastigmine patch started 8/2 to target neurocognitive disorder     Laboratory/Imaging: weekly WBC and ANC for clozapine monitoring continues ANC 9/24 3.9, clozapine level 9/20 was 389;  weekly covid negative 9/20     Patient will be treated in therapeutic milieu with appropriate individual and group therapies as described.     Medical treatment/interventions:  Medical concerns:   1) IM consult placed on admission, per consult note on 7/1/21:   Diagnoses:    #Major neurocognitive disorder dt hx of TBI and alcohol use disorder  #Schizophrenia  #Under commitment  Had been residing in group home prior to admission.  Brought to hospital for evaluation of aggressive behaviors. Group home now unwilling to take him back. Has had numerous CODE 21s called this stay. Seen by psych, meds adjusted. Is currently under civil commitment and court hold through [facility] until 7/31/21. Please see recent discharge summary for  details on 6/30/20201.   -Management per psychiatry      #Vitamin D Deficiency- Vit D level 16. Has been in hospital since Sept 2020. Started on cholecalciferol 44890 units on 6/17. Continue high-dose weekly replacement for 6 weeks total. Following high dose replacement recommend starting vitamin D 2000 international unit(s) daily replacement (start date 8/5/2021).      #Possible Tardive Dyskinesia vs EPSE- Per psych assessment on 4/1/21, noted to have possible tardive dyskinesia vs extrapyramidal side effects of meds.  - At that time, recommended to increase Cogentin to 1mg BID and add vitamin E 800 international unit(s) daily.      #Hx of CVA - hx of basal ganglia stroke in 2015. No focal neuro deficits aside from cognitive dysfunction as above.      #Hyperlipidemia - Continue PTA ASA 81 mg daily and atorvastatin 80 mg daily.      #COPD - Not O2 dependent. Continue PTA on salmeterol and albuterol PRN. Patient occasionally refusing inhalers, encourage compliance. Mucinex added for intermittent cough at [facility].      #Hypothyroidism- TSH 3.18 on 9/2020. Repeat recently on 5/29/2021 slightly elevated 5.18, with no T4 . Continue PTA levothyroxine 88 mcg daily. Repeat TSH with reflex to T4.      #Hx of infective endocarditis with severe mitral and aortic regurgitation S/P bioprosthetic valve replacement (10/2015)  #Hx of HFrEF, now recovered, not in acute exacerbation  Follows with Dr. Dockery of Heart and V[facility] Cardiology, last seen in 1/2020. Echocardiogram in 5/2016 with EF 50%, no evidence for prosthetic mitral and aortic valve dysfunction.   - Follow-up with outpatient Cardiology upon dismissal from the hospital (on every 2 year follow-ups).  - Continue ASA 81 mg daily      #Tobacco use disorder- Using nicotine patch and PRN gum.     #Non-severe malnutrition- Nutrition consulted and following at OSH.      Ordered CMP, CBC, and TSH with reflex.  No further recommendations at this time. Please page the on-call  SHREE for any intercurrent medical issues which arise.      ADDENDUM: TSH normal. CBC normal. ALT elevated at 174, with unsatisfactory AST. Per chart review, LFTs last checked in December with  and . T bili and alk phos.  Reviewed with pharmacy, and possible medications causing would be Haldol, depakote, and statin. Discussed with psych who will taper haldol and Depakote. Holding statin, will check LDL.  Will repeat LFTs in AM, and check CK. Medicine will follow up on repeat LFTs, and CK.      Edith Alfaro PA-C  Hospitalist Service     2) Per updated progress note by IM 7/4:   A/P:  #Transaminitis - slowly rising LFTs since December. Asymptomatic. Reviewed with pharmacy, and possible medications causing would be Haldol, depakote, and statin. Discussed with psych who will taper haldol and Depakote. Holding statin, (total cholesterol 91, with LDL 41). Repeat LFTs continue to rise slightly.   -Abdominal US tomorrow (NPO after midnight)  -Hepatitis B surface ab and agn and Hep C ab   -Repeat LFTs in 3 days     Medicine will follow up on Abdominal US, viral hepatitis studies and repeat LFTs.  No further recommendations at this time. Please page the on-call SHREE for any intercurrent medical issues which arise.      Edith Alfaro PA-C  Hospitalist Service     Per progress note 7/12:      Brief Medicine Note     Medicine following peripherally for LFT monitoring.  LFTs today improved:  (182) and ALT 92 (129). T bili and ALP WNL.      Today's vital signs, medications, and nursing notes were reviewed.       /77 (BP Location: Left arm)   Pulse 110   Temp 97.8  F (36.6  C) (Oral)   Resp 16   Ht 1.829 m (6')   Wt 87.5 kg (192 lb 14.4 oz)   SpO2 95%   BMI 26.16 kg/m       Continue current plan of care. Will continue to trend CMP In 3 days to ensure continued downtrend.      Kim Harman PA-C  Hospitalist Service        3) Urinary incontinence and stool incontinence: RN staff first  "reported urinary incontinence over weekend 7/9, per chart review patient has history of intermittent urinary incontinence going back to at least 2016, continues to have intermittent episodes of incontinence, continues to have both urinary and stool incontinence, placed IM consult for further assessment of need for additional work up given pts limitations as historian, appreciate assistance, no abnormalities noted on evaluation including bladder scan. Patient declining to wear incontinence briefs most days, staff continuing to encourage and started behavioral plan 9/10 to provide patient with pizza 1x per week for cooperation with incontinence cares. Pt did not wear briefs evening of 9/14, more agreeable 9/15.     4) Unvaccinated for Covid-19, see interim history of note 8/19 by writer, attempted to discuss R/B/A of vaccination with patient, he did not demonstrate capacity, has no next of kin acting as surrogate decision maker, is awaiting guardianship, given patients age and medical co-morbidities the risks of receiving Covid vaccine are outweighed by the benefits, including patient is required to be vaccinated to be considered for discharge to some nursing home facilities which would be a more appropriate milieu and less restrictive environment for patient than locked inpatient psychiatric unit. Dr. Nickerson provided second opinion regarding administration of Covid vaccine on 8.20 and agrees with recommendation.   -Covid vaccine order placed 8/23, J&J vaccine given 8/24    5) On 9/17 Pt reported pain in \"heart and arms\", discussed with IM who evaluated patient given significant cardiac history, EKG completed and patient reported to IM he  was no longer having any pain/discomfort, see IM progress note 9/17 for complete details, agree with assessment and plan as outlined by IM    This note was created by undersigned using a Dragon dictation system. All typing errors or contextual distortion are unintentional and " software inherent.     Disposition Plan   Reason for ongoing admission: is unable to care for self due to severe psychosis or mariusz and neurocognitive disorder   Discharge location: TBD, likely locked NH facility , unable to progress on discharge planning due to guardianship not being in place, see CTC notes  Discharge Medications: not ordered  Follow-up Appointments: not scheduled  Legal Status: Full MI commitment and Yanez     Entered by: Jeanette Dobbins on 9/27/2021 at 7:37 AM

## 2021-09-27 NOTE — PLAN OF CARE
"Pt was more agitated today than previous shifts. He began yelling about \"Bridgette\" stealing all his money than said she was dead from a car accident. He did not respond when I asked who told him that. He then began yelling and swearing about having a clean record, \"I don't do that shit no more. I didn't rape no one. My records clean!\" Writer asked if his voices were being mean or bothering him, \"Fuckin voices in my head have been yelling all god damn day!\" He was willing to take a PRN medication and was given risperdal ODT.  He continued to yell so was asked to walk down to his room. He didn't respond to writer but continued to respond to hallucinations. Writer helped him to his feet and he then walked to his room. He continued to yell, \"I wouldn't rape no one, who'd want to rape that skinny scare crow. I'm going to shoot all of you.\"  Writer attempted to provide reassurance and support but did not appear that John was able to hear it.   Once to his room, he laid down on his bed, and said \"I'm going to cool my jets for a while\"    Patient came back out to the lounge about 20 minutes later. He began loudly swearing again and was asked to walk back to his room. He said he didn't want to. Writer said he had to stop yelling if he wanted to stay in the unge. He replied \"Ok, just let me watch t.v.\"     He continued to respond to AH throughout the evening, but only had 2 brief outbursts of yelling. He was able to respond to staff inquiries and was otherwise cooperative with cares.   "

## 2021-09-27 NOTE — PLAN OF CARE
Assessment/Intervention/Current Symtoms and Care Coordination  -Chart review  -Pre round meeting with team  -Rounded with team, addressed patient needs/concerns  -Post round meeting with team  Current Symptoms include the following: Psychosis, disorganization and confusion  Completed team note     Discharge Plan or Goal  Pending stabilization & development of a safe discharge plan.  Considerations include: locked behavorial unit in nursing home     Barriers to Discharge  The pt does not have a guardian and a guardian has not been found for this pt. Nursing homes are requiring a guardian.     Referral Status  locked behavioral unit in nursing home-no new outside referrals were made today      Legal Status  Patient is under MI commitment in [facility]

## 2021-09-27 NOTE — PLAN OF CARE
Pt has slept all night. Occasional loose cough noted. No needs identified. Precautions maintained.

## 2021-09-27 NOTE — PLAN OF CARE
Problem: Psychotic Symptoms  Goal: Social and Therapeutic (Psychotic Symptoms)  Description: Signs and symptoms of listed problems will be absent or manageable.  Outcome: No Change       Pt has spent this shift resting in his room and spending time in the lounge, pt ate both meals in lounge. Pt is not social with peers, observed responding to internal stimuli but has not had any verbal outbursts today, affect is flat and blunted. Pt pulse was elevated this morning and he was encouraged to drink fluids. Pt was not wearing brief when writer spoke with him in his room, pt was asked to put them on and he stated he was too tired, writer reminded him about the agreement to wear briefs and he acknowledged this and said he would put them on when he got up to use the restroom, pt linens were dry at this time. Pt was started on cheeking precautions this shift and was compliant with mouth check, requested a cigarette and writer offered him PRN nicotine lozenge which he accepted. No SI/SIB/HI observed or reported this shift, pt continues to have hallucinations. Pt has a flat affect, appears disheveled but ADLs improved. Pt denies pain or physical concerns, baseline TD noted. Will continue to monitor and support plan of care.

## 2021-09-28 LAB — SARS-COV-2 RNA RESP QL NAA+PROBE: NEGATIVE

## 2021-09-28 PROCEDURE — 250N000013 HC RX MED GY IP 250 OP 250 PS 637: Performed by: PSYCHIATRY & NEUROLOGY

## 2021-09-28 PROCEDURE — 124N000002 HC R&B MH UMMC

## 2021-09-28 PROCEDURE — U0005 INFEC AGEN DETEC AMPLI PROBE: HCPCS | Performed by: PSYCHIATRY & NEUROLOGY

## 2021-09-28 PROCEDURE — 250N000013 HC RX MED GY IP 250 OP 250 PS 637: Performed by: PHYSICIAN ASSISTANT

## 2021-09-28 PROCEDURE — 99232 SBSQ HOSP IP/OBS MODERATE 35: CPT | Performed by: PSYCHIATRY & NEUROLOGY

## 2021-09-28 RX ADMIN — POLYETHYLENE GLYCOL 3350 17 G: 17 POWDER, FOR SOLUTION ORAL at 20:49

## 2021-09-28 RX ADMIN — DOCUSATE SODIUM 100 MG: 100 CAPSULE, LIQUID FILLED ORAL at 20:44

## 2021-09-28 RX ADMIN — GUAIFENESIN 600 MG: 600 TABLET, EXTENDED RELEASE ORAL at 20:43

## 2021-09-28 RX ADMIN — POLYETHYLENE GLYCOL 3350 17 G: 17 POWDER, FOR SOLUTION ORAL at 09:30

## 2021-09-28 RX ADMIN — MEMANTINE 10 MG: 5 TABLET ORAL at 09:31

## 2021-09-28 RX ADMIN — MEMANTINE 10 MG: 5 TABLET ORAL at 20:43

## 2021-09-28 RX ADMIN — Medication 50 MCG: at 09:31

## 2021-09-28 RX ADMIN — DOCUSATE SODIUM 100 MG: 100 CAPSULE, LIQUID FILLED ORAL at 09:31

## 2021-09-28 RX ADMIN — ATORVASTATIN CALCIUM 80 MG: 80 TABLET, FILM COATED ORAL at 20:43

## 2021-09-28 RX ADMIN — RIVASTIGMINE 1 PATCH: 4.6 PATCH TRANSDERMAL at 09:33

## 2021-09-28 RX ADMIN — METOPROLOL TARTRATE 25 MG: 25 TABLET, FILM COATED ORAL at 09:31

## 2021-09-28 RX ADMIN — METOPROLOL TARTRATE 25 MG: 25 TABLET, FILM COATED ORAL at 20:43

## 2021-09-28 RX ADMIN — BENZTROPINE MESYLATE 1 MG: 1 TABLET ORAL at 20:44

## 2021-09-28 RX ADMIN — CLOZAPINE 50 MG: 50 TABLET ORAL at 09:31

## 2021-09-28 RX ADMIN — SALMETEROL XINAFOATE 1 PUFF: 50 POWDER, METERED ORAL; RESPIRATORY (INHALATION) at 20:40

## 2021-09-28 RX ADMIN — GUAIFENESIN 600 MG: 600 TABLET, EXTENDED RELEASE ORAL at 09:31

## 2021-09-28 RX ADMIN — ASPIRIN 81 MG CHEWABLE TABLET 81 MG: 81 TABLET CHEWABLE at 09:32

## 2021-09-28 RX ADMIN — BENZTROPINE MESYLATE 1 MG: 1 TABLET ORAL at 09:32

## 2021-09-28 RX ADMIN — LEVOTHYROXINE SODIUM 88 MCG: 88 TABLET ORAL at 09:32

## 2021-09-28 RX ADMIN — CLOZAPINE 300 MG: 100 TABLET ORAL at 20:43

## 2021-09-28 ASSESSMENT — ACTIVITIES OF DAILY LIVING (ADL)
HYGIENE/GROOMING: PROMPTS
DRESS: INDEPENDENT
DRESS: PROMPTS
ORAL_HYGIENE: PROMPTS
HYGIENE/GROOMING: PROMPTS
LAUNDRY: UNABLE TO COMPLETE
ORAL_HYGIENE: PROMPTS
LAUNDRY: UNABLE TO COMPLETE

## 2021-09-28 ASSESSMENT — MIFFLIN-ST. JEOR: SCORE: 1756.13

## 2021-09-28 NOTE — PROGRESS NOTES
"[facility], [place]   Psychiatric Progress Note  Hospital Day: 90        Interim History:   The patient's care was discussed with the treatment team during the daily team meeting and/or staff's chart notes were reviewed.  Staff report patient has been visible in the milieu, taking medications as prescribed, continues to be disorganized and responding to internal stimuli, some outbursts at times, no acute events overnight.     Upon interview pt reports mood is \"tired\" due to \"people coming into [his] head\" continue to be loud and disrupting his sleep. Denies VH however appeared to be looking past writer during interview. Tolerating medications, denies noticing TD movements or being bothered by them. He then requested to lay down and sleep and declined to speak further with writer. Did reply \"no\" to SI or HI or other physical health concerns.          Medications:       aspirin  81 mg Oral Daily     atorvastatin  80 mg Oral At Bedtime     benztropine  1 mg Oral BID     cholecalciferol  1,250 mcg Oral Q7 Days     cloZAPine  300 mg Oral At Bedtime     cloZAPine  50 mg Oral Daily     docusate sodium  100 mg Oral BID     guaiFENesin  600 mg Oral BID     levothyroxine  88 mcg Oral Daily     memantine  10 mg Oral BID     metoprolol tartrate  25 mg Oral BID     polyethylene glycol  17 g Oral Daily     rivastigmine  1 patch Transdermal Daily     rivastigmine   Transdermal Q8H     salmeterol  1 puff Inhalation BID     vitamin D3  50 mcg Oral Daily          Allergies:     Allergies   Allergen Reactions     Ibuprofen      Latex           Labs:     No results found for this or any previous visit (from the past 48 hour(s)).       Psychiatric Examination:     /71 (BP Location: Left arm)   Pulse 99   Temp 97.5  F (36.4  C) (Oral)   Resp 16   Ht 1.829 m (6')   Wt 89.3 kg (196 lb 14.4 oz)   SpO2 97%   BMI 26.70 kg/m    Weight is 196 lbs 14.4 oz  Body mass index is 26.7 kg/m .    Orthostatic " "Vitals       Most Recent      Sitting Orthostatic /92 09/28 0700    Sitting Orthostatic Pulse (bpm) 114 09/28 0700    Standing Orthostatic /81 09/28 0700    Standing Orthostatic Pulse (bpm) 102 09/28 0700        Appearance: awake, alert and untidy  Attitude: somewhat cooperative  Eye Contact:  poor, often looking away from writer  Mood:  \"tired\" continues to have periods of irritability   Affect:  mood congruent and intensity is flat  Speech:  soft volume, raspy ,dysarthric   Language: fluent and intact in English  Psychomotor, Gait, Musculoskeletal:  no evidence of dystonia, or tics, continues to have movements of buccolingual dyskinesia present during interview  Thought Process: disorganized  Associations:  no loose associations  Thought Content:  denies SI and HI; continues to be delusional and observed responding to internal stimuli during interview, endorses AH, denies VH however appears to be experiencing VH  Insight:  limited  Judgement:  limited  Oriented to:  person, continues to believe he is a [facility]  Attention Span and Concentration:  limited  Recent and Remote Memory:  limited  Fund of Knowledge:  delayed    Clinical Global Impressions  First:  Considering your total clinical experience with this particular patient population, how severe are the patient's symptoms at this time?: 7 (07/01/21 0630)  Compared to the patient's condition at the START of treatment, this patient's condition is: 4 (07/01/21 0630)  Most recent:  Considering your total clinical experience with this particular patient population, how severe are the patient's symptoms at this time?: 6 (09/24/21 1727)  Compared to the patient's condition at the START of treatment, this patient's condition is: 3 (09/24/21 1727)         Precautions:     Behavioral Orders   Procedures     Assault precautions     Cheeking Precautions (behavioral units)     Patient Observed swallowing PO medications; Patient asked to drink water after " swallowing medication; Patient in Staff line of sight for 15 minutes after medication given; Mouth checks after PO administration (patient asked to open mouth and stick out their tongue).     Code 1     Elopement precautions     Routine Programming     As clinically indicated     Single Room     Status 15     Every 15 minutes.          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   Stimulant use disorder, in remission.   Transaminitis, possibly medication induced   Hx of CVA  Vit D deficiency  Hypertension  COPD  Hx of infective endocarditis with severe mitral and aortic regurgitation s/p biprostehtic valve replacement 2015  Hx of HFrEF now recovered  Tobacco use disorder  Non-severe malnutrition   Urinary incontinence         Assessment & Plan:   Assessment and hospital summary:  The patient is a 54yo male with a history of schizophrenia and TBI and multiple medical co-morbidities as above who was admitted after being transferred from [facility] after a nearly year-long stay. Is currently under commitment with Yanez recently amended to include Clozaril. While at [facility] was noted to have ongoing agitation and frequently attempting to elope from unit requiring 1:1 staffing for safety. He was started on 1:1 staff upon transfer, this was discontinued as patient has been more cooperative without elopement attempts. IM consult completed on admission and continued to follow due to elevated LFTs. Haldol and VPA discontinued due to LFTs. Cross titration completed from risperidone to clozaril after Yanez amended. EPSE/TD movements noted, have appeared stable over past few weeks. Patient has continued to have disorganization and active psychosis symptoms which appear to be at patients baseline. Team continues to work on disposition  which barriers include patient has no guardian or next of kin willing to act as surrogate decision maker, see CTC notes for complete details. Patient starting to have increasing symptoms noted week of 9/20, clozapine level ordered and noted to be lower than previous, have added cheeking precautions and increased clozapine dose on 9/24.     Psychiatric treatment/inteventions:  Medications:   -continue Clozaril 50mg in AM and 300mg at bedtime, started trial of split dosing 9/27 to see if patient better tolerates and targets afternoon agitation better to reduce PRN use; recent level was lower than previous at 389 on 9/20, continue to monitor movements consistent with TD  -started cheeking precautions 9/27 given noted decompensation in recent weeks and lower clozapine level   -continue benztropine 1mg BID for EPSE  -continue memantine 5mg daily for neurocognitive disorder  -continue risperdal 1mg Q6H PRN agitation   -continue lorazepam 0.5-1.0mg Q4H PRN anxiety or severe agitation  -continue rivastigmine patch started 8/2 to target neurocognitive disorder     Laboratory/Imaging: weekly WBC and ANC for clozapine monitoring continues ANC 9/24 3.9, clozapine level 9/20 was 389;  weekly covid negative 9/20, ordered new test today     Patient will be treated in therapeutic milieu with appropriate individual and group therapies as described.     Medical treatment/interventions:  Medical concerns:   1) IM consult placed on admission, per consult note on 7/1/21:   Diagnoses:    #Major neurocognitive disorder dt hx of TBI and alcohol use disorder  #Schizophrenia  #Under commitment  Had been residing in group home prior to admission.  Brought to hospital for evaluation of aggressive behaviors. Group home now unwilling to take him back. Has had numerous CODE 21s called this stay. Seen by psych, meds adjusted. Is currently under civil commitment and court hold through [facility] until 7/31/21. Please see recent discharge  summary for details on 6/30/20201.   -Management per psychiatry      #Vitamin D Deficiency- Vit D level 16. Has been in hospital since Sept 2020. Started on cholecalciferol 02563 units on 6/17. Continue high-dose weekly replacement for 6 weeks total. Following high dose replacement recommend starting vitamin D 2000 international unit(s) daily replacement (start date 8/5/2021).      #Possible Tardive Dyskinesia vs EPSE- Per psych assessment on 4/1/21, noted to have possible tardive dyskinesia vs extrapyramidal side effects of meds.  - At that time, recommended to increase Cogentin to 1mg BID and add vitamin E 800 international unit(s) daily.      #Hx of CVA - hx of basal ganglia stroke in 2015. No focal neuro deficits aside from cognitive dysfunction as above.      #Hyperlipidemia - Continue PTA ASA 81 mg daily and atorvastatin 80 mg daily.      #COPD - Not O2 dependent. Continue PTA on salmeterol and albuterol PRN. Patient occasionally refusing inhalers, encourage compliance. Mucinex added for intermittent cough at [facility].      #Hypothyroidism- TSH 3.18 on 9/2020. Repeat recently on 5/29/2021 slightly elevated 5.18, with no T4 . Continue PTA levothyroxine 88 mcg daily. Repeat TSH with reflex to T4.      #Hx of infective endocarditis with severe mitral and aortic regurgitation S/P bioprosthetic valve replacement (10/2015)  #Hx of HFrEF, now recovered, not in acute exacerbation  Follows with Dr. Dockery of [facility], last seen in 1/2020. Echocardiogram in 5/2016 with EF 50%, no evidence for prosthetic mitral and aortic valve dysfunction.   - Follow-up with outpatient Cardiology upon dismissal from the hospital (on every 2 year follow-ups).  - Continue ASA 81 mg daily      #Tobacco use disorder- Using nicotine patch and PRN gum.     #Non-severe malnutrition- Nutrition consulted and following at OSH.      Ordered CMP, CBC, and TSH with reflex.  No further recommendations at this time. Please page  the on-call SHREE for any intercurrent medical issues which arise.      ADDENDUM: TSH normal. CBC normal. ALT elevated at 174, with unsatisfactory AST. Per chart review, LFTs last checked in December with  and . T bili and alk phos.  Reviewed with pharmacy, and possible medications causing would be Haldol, depakote, and statin. Discussed with psych who will taper haldol and Depakote. Holding statin, will check LDL.  Will repeat LFTs in AM, and check CK. Medicine will follow up on repeat LFTs, and CK.      Edith Alfaro PA-C  Hospitalist Service     2) Per updated progress note by ALEJANDRO 7/4:   A/P:  #Transaminitis - slowly rising LFTs since December. Asymptomatic. Reviewed with pharmacy, and possible medications causing would be Haldol, depakote, and statin. Discussed with psych who will taper haldol and Depakote. Holding statin, (total cholesterol 91, with LDL 41). Repeat LFTs continue to rise slightly.   -Abdominal US tomorrow (NPO after midnight)  -Hepatitis B surface ab and agn and Hep C ab   -Repeat LFTs in 3 days     Medicine will follow up on Abdominal US, viral hepatitis studies and repeat LFTs.  No further recommendations at this time. Please page the on-call SHREE for any intercurrent medical issues which arise.      dEith Alfaro PA-C  Hospitalist Service     Per progress note 7/12:      Brief Medicine Note     Medicine following peripherally for LFT monitoring.  LFTs today improved:  (182) and ALT 92 (129). T bili and ALP WNL.      Today's vital signs, medications, and nursing notes were reviewed.       /77 (BP Location: Left arm)   Pulse 110   Temp 97.8  F (36.6  C) (Oral)   Resp 16   Ht 1.829 m (6')   Wt 87.5 kg (192 lb 14.4 oz)   SpO2 95%   BMI 26.16 kg/m       Continue current plan of care. Will continue to trend CMP In 3 days to ensure continued downtrend.      Kim Harman PA-C  Hospitalist Service        3) Urinary incontinence and stool incontinence: RN  "staff first reported urinary incontinence over weekend 7/9, per chart review patient has history of intermittent urinary incontinence going back to at least 2016, continues to have intermittent episodes of incontinence, continues to have both urinary and stool incontinence, placed IM consult for further assessment of need for additional work up given pts limitations as historian, appreciate assistance, no abnormalities noted on evaluation including bladder scan. Patient declining to wear incontinence briefs most days, staff continuing to encourage and started behavioral plan 9/10 to provide patient with pizza 1x per week for cooperation with incontinence cares. Pt did not wear briefs evening of 9/14, more agreeable 9/15.     4) Unvaccinated for Covid-19, see interim history of note 8/19 by writer, attempted to discuss R/B/A of vaccination with patient, he did not demonstrate capacity, has no next of kin acting as surrogate decision maker, is awaiting guardianship, given patients age and medical co-morbidities the risks of receiving Covid vaccine are outweighed by the benefits, including patient is required to be vaccinated to be considered for discharge to some nursing home facilities which would be a more appropriate milieu and less restrictive environment for patient than locked inpatient psychiatric unit. Dr. Nickerson provided second opinion regarding administration of Covid vaccine on 8.20 and agrees with recommendation.   -Covid vaccine order placed 8/23, J&J vaccine given 8/24    5) On 9/17 Pt reported pain in \"heart and arms\", discussed with IM who evaluated patient given significant cardiac history, EKG completed and patient reported to IM he  was no longer having any pain/discomfort, see IM progress note 9/17 for complete details, agree with assessment and plan as outlined by IM    This note was created by undersigned using a Dragon dictation system. All typing errors or contextual distortion are " unintentional and software inherent.     Disposition Plan   Reason for ongoing admission: is unable to care for self due to severe psychosis or mariusz and neurocognitive disorder   Discharge location: TBD, likely locked NH facility , unable to progress on discharge planning due to guardianship not being in place, see CTC notes  Discharge Medications: not ordered  Follow-up Appointments: not scheduled  Legal Status: Full MI commitment and Yanez     Entered by: Jeanette Dobbins on 9/28/2021 at 9:19 AM

## 2021-09-28 NOTE — PLAN OF CARE
Pt was calmer this shift. Continues to respond to hallucinations but said they were quieter and less bothersome than yesterday. Had one brief outburst of swearing and raised voice about 10pm but lasted only 5-10 sec and did not require redirection. Did not respond to staff asking if he was doing ok.   He was cooperative with cares but did not want to put on his attends.   Reminded of change to clozaril schedule and he did not respond.

## 2021-09-28 NOTE — PLAN OF CARE
Patient alert and oriented to person, place, and time, adequately groomed showered this morning. Pt is pleasant, good eye contact, cooperative, medication compliant. PRN given for anxiety. Pt on suicidal precautions, no behaviors observed. Pt became irritated when presented with medications, refused to answer assessment, communication regarding needs. This writer did not observe SI/HI/SIB behavior, unable to assess anxiety and depression. Pt did not appear to be responding to internal stimuli. Pt engaged in community group, completed his menu with help from staff. Pt observed sitting in lounge area, no communicatoin with peers. Pt will communicate needs to staff one minute then mute the next minute.  No other behaviors noted. Pt napped this afternoon and was incontinent, bedding changed, pt changed scrubs, sock, and sweatshirt. Will continue to monitor behavior, and encourage engagement and communication.     Problem: Behavioral Health Plan of Care  Goal: Plan of Care Review  Outcome: No Change     Problem: Behavioral Health Plan of Care  Goal: Patient-Specific Goal (Individualization)  Outcome: No Change  Note:        Problem: Behavioral Health Plan of Care  Goal: Adheres to Safety Considerations for Self and Others  Outcome: No Change     Problem: Sleep Disturbance  Goal: Adequate Sleep/Rest  Outcome: No Change     Problem: Cognitive Impairment (Psychotic Signs/Symptoms)  Goal: Optimal Cognitive Function (Psychotic Signs/Symptoms)  Outcome: No Change     Problem: Behavioral Disturbance  Goal: Behavioral Disturbance  Description: Signs and symptoms of listed problems will be absent or manageable by discharge or transition of care.  Outcome: No Change

## 2021-09-28 NOTE — PLAN OF CARE
Night Shift Summary (9/27/21 into 09/28/21)    Pt in bed sleeping at start of shift. Breathing quiet and unlabored. Appears to have slept 7 hours.    Assault, Elopement and Cheeking precautions in addition to Single Room order; any related events noted above.     Will continue to monitor and assess.       Problem: Behavioral Health Plan of Care  Goal: Absence of New-Onset Illness or Injury  Outcome: Improving     Problem: Sleep Disturbance  Goal: Adequate Sleep/Rest  Outcome: Improving

## 2021-09-28 NOTE — PLAN OF CARE
Assessment/Intervention/Current Symtoms and Care Coordination  -Chart review  -Pre round meeting with team  -Rounded with team, addressed patient needs/concerns  -Post round meeting with team  Current Symptoms include the following: Psychosis, disorganization and confusion.     Discharge Plan or Goal  Pending stabilization & development of a safe discharge plan.  Considerations include: locked behavorial unit in nursing home     Barriers to Discharge  The pt does not have a guardian and a guardian has not been found for this pt. Nursing homes are requiring a guardian.     Referral Status  locked behavioral unit in nursing home  no new outside referrals were made today      Legal Status  Patient is under MI commitment in [facility]

## 2021-09-29 PROCEDURE — 99232 SBSQ HOSP IP/OBS MODERATE 35: CPT | Performed by: PSYCHIATRY & NEUROLOGY

## 2021-09-29 PROCEDURE — 250N000013 HC RX MED GY IP 250 OP 250 PS 637: Performed by: PHYSICIAN ASSISTANT

## 2021-09-29 PROCEDURE — 124N000002 HC R&B MH UMMC

## 2021-09-29 PROCEDURE — 250N000013 HC RX MED GY IP 250 OP 250 PS 637: Performed by: PSYCHIATRY & NEUROLOGY

## 2021-09-29 RX ADMIN — ATORVASTATIN CALCIUM 80 MG: 80 TABLET, FILM COATED ORAL at 20:56

## 2021-09-29 RX ADMIN — SALMETEROL XINAFOATE 1 PUFF: 50 POWDER, METERED ORAL; RESPIRATORY (INHALATION) at 20:56

## 2021-09-29 RX ADMIN — POLYETHYLENE GLYCOL 3350 17 G: 17 POWDER, FOR SOLUTION ORAL at 08:16

## 2021-09-29 RX ADMIN — METOPROLOL TARTRATE 25 MG: 25 TABLET, FILM COATED ORAL at 20:56

## 2021-09-29 RX ADMIN — CLOZAPINE 50 MG: 50 TABLET ORAL at 08:15

## 2021-09-29 RX ADMIN — GUAIFENESIN 600 MG: 600 TABLET, EXTENDED RELEASE ORAL at 20:55

## 2021-09-29 RX ADMIN — BENZTROPINE MESYLATE 1 MG: 1 TABLET ORAL at 20:55

## 2021-09-29 RX ADMIN — GUAIFENESIN 600 MG: 600 TABLET, EXTENDED RELEASE ORAL at 08:15

## 2021-09-29 RX ADMIN — BENZTROPINE MESYLATE 1 MG: 1 TABLET ORAL at 08:15

## 2021-09-29 RX ADMIN — MEMANTINE 10 MG: 5 TABLET ORAL at 20:55

## 2021-09-29 RX ADMIN — MEMANTINE 10 MG: 5 TABLET ORAL at 08:15

## 2021-09-29 RX ADMIN — Medication 50 MCG: at 08:15

## 2021-09-29 RX ADMIN — ASPIRIN 81 MG CHEWABLE TABLET 81 MG: 81 TABLET CHEWABLE at 08:15

## 2021-09-29 RX ADMIN — DOCUSATE SODIUM 100 MG: 100 CAPSULE, LIQUID FILLED ORAL at 20:55

## 2021-09-29 RX ADMIN — CLOZAPINE 300 MG: 100 TABLET ORAL at 20:55

## 2021-09-29 RX ADMIN — RIVASTIGMINE 1 PATCH: 4.6 PATCH TRANSDERMAL at 08:15

## 2021-09-29 RX ADMIN — LEVOTHYROXINE SODIUM 88 MCG: 88 TABLET ORAL at 08:15

## 2021-09-29 RX ADMIN — DOCUSATE SODIUM 100 MG: 100 CAPSULE, LIQUID FILLED ORAL at 08:15

## 2021-09-29 RX ADMIN — METOPROLOL TARTRATE 25 MG: 25 TABLET, FILM COATED ORAL at 08:15

## 2021-09-29 NOTE — PROGRESS NOTES
"[facility], [place]   Psychiatric Progress Note  Hospital Day: 91        Interim History:   The patient's care was discussed with the treatment team during the daily team meeting and/or staff's chart notes were reviewed.  Staff report patient was visible in milieu but keeps to self, taking medications as prescribed, had 3-4 outbursts, declined PRN medications, appears responding to internal stimuli, TD movements, continue, declined to put on incontinence briefs, no acute events overnight.     Upon interview patient was lying in bed awake, states his mood is again \"tired\" due to   \"the people coming in [his] head\" at night. He does states they have been worse recently and writer reviewed that is why medications are being adjusted. He denies VH. Denies SI or HI. He denies noticing the TD movements. He denies other side effects, reports going to the bathroom without difficulty, no pain or discomfort. He asked when he is \"getting out of this place\" continues to state he is at [facility], reviewed team is working on placement and he mentioned his \"[place] of mansions [place] over [place]\" and specifically mansions in [place] and [place]. Redirected back to treatment plan, he denies having any other concerns.          Medications:       aspirin  81 mg Oral Daily     atorvastatin  80 mg Oral At Bedtime     benztropine  1 mg Oral BID     cholecalciferol  1,250 mcg Oral Q7 Days     cloZAPine  300 mg Oral At Bedtime     cloZAPine  50 mg Oral Daily     docusate sodium  100 mg Oral BID     guaiFENesin  600 mg Oral BID     levothyroxine  88 mcg Oral Daily     memantine  10 mg Oral BID     metoprolol tartrate  25 mg Oral BID     polyethylene glycol  17 g Oral Daily     rivastigmine  1 patch Transdermal Daily     rivastigmine   Transdermal Q8H     salmeterol  1 puff Inhalation BID     vitamin D3  50 mcg Oral Daily          Allergies:     Allergies   Allergen Reactions     Ibuprofen      Latex           " "Labs:     Recent Results (from the past 48 hour(s))   Asymptomatic COVID-19 Virus (Coronavirus) by PCR Nasopharyngeal    Collection Time: 09/28/21  1:24 PM    Specimen: Nasopharyngeal; Swab   Result Value Ref Range    SARS CoV2 PCR Negative Negative          Psychiatric Examination:     /77   Pulse 93   Temp 98.1  F (36.7  C) (Oral)   Resp 18   Ht 1.829 m (6')   Wt 89.3 kg (196 lb 14.4 oz)   SpO2 98%   BMI 26.70 kg/m    Weight is 196 lbs 14.4 oz  Body mass index is 26.7 kg/m .    Orthostatic Vitals       Most Recent      Sitting Orthostatic /82 09/29 0829    Sitting Orthostatic Pulse (bpm) 100 09/29 0829    Standing Orthostatic /79 09/29 0829    Standing Orthostatic Pulse (bpm) 106 09/29 0829        Appearance: awake, alert and untidy  Attitude: more cooperative today   Eye Contact:  poor, often looking away from writer  Mood:  \"tired\" continues to have periods of irritability   Affect:  mood congruent and intensity is flat  Speech:  soft volume, raspy ,dysarthric   Language: fluent and intact in English  Psychomotor, Gait, Musculoskeletal:  no evidence of dystonia, or tics, continues to have movements of buccolingual dyskinesia present during interview  Thought Process: disorganized  Associations:  no loose associations  Thought Content:  denies SI and HI; continues to be delusional and observed responding to internal stimuli during interview, endorses AH, denies VH however appears to be experiencing VH  Insight:  limited  Judgement:  limited  Oriented to:  person, continues to believe he is a [facility]  Attention Span and Concentration:  limited  Recent and Remote Memory:  limited  Fund of Knowledge:  delayed    Clinical Global Impressions  First:  Considering your total clinical experience with this particular patient population, how severe are the patient's symptoms at this time?: 7 (07/01/21 0630)  Compared to the patient's condition at the START of treatment, this patient's condition is: 4 " (07/01/21 0630)  Most recent:  Considering your total clinical experience with this particular patient population, how severe are the patient's symptoms at this time?: 6 (09/24/21 1727)  Compared to the patient's condition at the START of treatment, this patient's condition is: 3 (09/24/21 1727)         Precautions:     Behavioral Orders   Procedures     Assault precautions     Cheeking Precautions (behavioral units)     Patient Observed swallowing PO medications; Patient asked to drink water after swallowing medication; Patient in Staff line of sight for 15 minutes after medication given; Mouth checks after PO administration (patient asked to open mouth and stick out their tongue).     Code 1     Elopement precautions     Routine Programming     As clinically indicated     Single Room     Status 15     Every 15 minutes.          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   Stimulant use disorder, in remission.   Transaminitis, possibly medication induced   Hx of CVA  Vit D deficiency  Hypertension  COPD  Hx of infective endocarditis with severe mitral and aortic regurgitation s/p biprostehtic valve replacement 2015  Hx of HFrEF now recovered  Tobacco use disorder  Non-severe malnutrition   Urinary incontinence         Assessment & Plan:   Assessment and hospital summary:  The patient is a 54yo male with a history of schizophrenia and TBI and multiple medical co-morbidities as above who was admitted after being transferred from [facility] medical [facility] after a nearly year-long stay. Is currently under commitment with Yanez recently amended to include Clozaril. While at [facility] was noted to have ongoing agitation and frequently attempting to elope from unit requiring 1:1 staffing for safety. He was started on 1:1 staff upon transfer,  this was discontinued as patient has been more cooperative without elopement attempts. IM consult completed on admission and continued to follow due to elevated LFTs. Haldol and VPA discontinued due to LFTs. Cross titration completed from risperidone to clozaril after Yanez amended. EPSE/TD movements noted, have appeared stable over past few weeks. Patient has continued to have disorganization and active psychosis symptoms which appear to be at patients baseline. Team continues to work on disposition which barriers include patient has no guardian or next of kin willing to act as surrogate decision maker, see CTC notes for complete details. Patient starting to have increasing symptoms noted week of 9/20, clozapine level ordered and noted to be lower than previous, have added cheeking precautions and increased clozapine dose on 9/24 and moved to split dosing given increasing afternoon agitation.    Psychiatric treatment/inteventions:  Medications:   -continue Clozaril 50mg in AM and 300mg at bedtime, started trial of split dosing 9/27 to see if patient better tolerates and targets afternoon agitation better to reduce PRN use; recent level was lower than previous at 389 on 9/20, continue to monitor movements consistent with TD  -continue cheeking precautions, started 9/27 given noted decompensation in recent weeks and lower clozapine level   -continue benztropine 1mg BID for EPSE  -continue memantine 5mg daily for neurocognitive disorder  -continue risperdal 1mg Q6H PRN agitation   -continue lorazepam 0.5-1.0mg Q4H PRN anxiety or severe agitation  -continue rivastigmine patch started 8/2 to target neurocognitive disorder     Laboratory/Imaging: weekly WBC and ANC for clozapine monitoring continues ANC 9/24 3.9, clozapine level 9/20 was 389;  weekly covid negative 9/20, ordered new test today     Patient will be treated in therapeutic milieu with appropriate individual and group therapies as described.     Medical  treatment/interventions:  Medical concerns:   1) IM consult placed on admission, per consult note on 7/1/21:   Diagnoses:    #Major neurocognitive disorder dt hx of TBI and alcohol use disorder  #Schizophrenia  #Under commitment  Had been residing in group home prior to admission.  Brought to hospital for evaluation of aggressive behaviors. Group home now unwilling to take him back. Has had numerous CODE 21s called this stay. Seen by psych, meds adjusted. Is currently under civil commitment and court hold through [facility] until 7/31/21. Please see recent discharge summary for details on 6/30/20201.   -Management per psychiatry      #Vitamin D Deficiency- Vit D level 16. Has been in hospital since Sept 2020. Started on cholecalciferol 16941 units on 6/17. Continue high-dose weekly replacement for 6 weeks total. Following high dose replacement recommend starting vitamin D 2000 international unit(s) daily replacement (start date 8/5/2021).      #Possible Tardive Dyskinesia vs EPSE- Per psych assessment on 4/1/21, noted to have possible tardive dyskinesia vs extrapyramidal side effects of meds.  - At that time, recommended to increase Cogentin to 1mg BID and add vitamin E 800 international unit(s) daily.      #Hx of CVA - hx of basal ganglia stroke in 2015. No focal neuro deficits aside from cognitive dysfunction as above.      #Hyperlipidemia - Continue PTA ASA 81 mg daily and atorvastatin 80 mg daily.      #COPD - Not O2 dependent. Continue PTA on salmeterol and albuterol PRN. Patient occasionally refusing inhalers, encourage compliance. Mucinex added for intermittent cough at [facility].      #Hypothyroidism- TSH 3.18 on 9/2020. Repeat recently on 5/29/2021 slightly elevated 5.18, with no T4 . Continue PTA levothyroxine 88 mcg daily. Repeat TSH with reflex to T4.      #Hx of infective endocarditis with severe mitral and aortic regurgitation S/P bioprosthetic valve replacement (10/2015)  #Hx of HFrEF, now  recovered, not in acute exacerbation  Follows with Dr. Dockery of [facility] V[facility], last seen in 1/2020. Echocardiogram in 5/2016 with EF 50%, no evidence for prosthetic mitral and aortic valve dysfunction.   - Follow-up with outpatient Cardiology upon dismissal from the hospital (on every 2 year follow-ups).  - Continue ASA 81 mg daily      #Tobacco use disorder- Using nicotine patch and PRN gum.     #Non-severe malnutrition- Nutrition consulted and following at OSH.      Ordered CMP, CBC, and TSH with reflex.  No further recommendations at this time. Please page the on-call SHREE for any intercurrent medical issues which arise.      ADDENDUM: TSH normal. CBC normal. ALT elevated at 174, with unsatisfactory AST. Per chart review, LFTs last checked in December with  and . T bili and alk phos.  Reviewed with pharmacy, and possible medications causing would be Haldol, depakote, and statin. Discussed with psych who will taper haldol and Depakote. Holding statin, will check LDL.  Will repeat LFTs in AM, and check CK. Medicine will follow up on repeat LFTs, and CK.      Edith Alfaro PA-C  Hospitalist Service     2) Per updated progress note by IM 7/4:   A/P:  #Transaminitis - slowly rising LFTs since December. Asymptomatic. Reviewed with pharmacy, and possible medications causing would be Haldol, depakote, and statin. Discussed with psych who will taper haldol and Depakote. Holding statin, (total cholesterol 91, with LDL 41). Repeat LFTs continue to rise slightly.   -Abdominal US tomorrow (NPO after midnight)  -Hepatitis B surface ab and agn and Hep C ab   -Repeat LFTs in 3 days     Medicine will follow up on Abdominal US, viral hepatitis studies and repeat LFTs.  No further recommendations at this time. Please page the on-call SHREE for any intercurrent medical issues which arise.      Edith Alfaro PA-C  Hospitalist Service     Per progress note 7/12:      Brief Medicine Note     Medicine  following peripherally for LFT monitoring.  LFTs today improved:  (182) and ALT 92 (129). T bili and ALP WNL.      Today's vital signs, medications, and nursing notes were reviewed.       /77 (BP Location: Left arm)   Pulse 110   Temp 97.8  F (36.6  C) (Oral)   Resp 16   Ht 1.829 m (6')   Wt 87.5 kg (192 lb 14.4 oz)   SpO2 95%   BMI 26.16 kg/m       Continue current plan of care. Will continue to trend CMP In 3 days to ensure continued downtrend.      Kim Harman PA-C  Hospitalist Service        3) Urinary incontinence and stool incontinence: RN staff first reported urinary incontinence over weekend 7/9, per chart review patient has history of intermittent urinary incontinence going back to at least 2016, continues to have intermittent episodes of incontinence, continues to have both urinary and stool incontinence, placed IM consult for further assessment of need for additional work up given pts limitations as historian, appreciate assistance, no abnormalities noted on evaluation including bladder scan. Patient declining to wear incontinence briefs most days, staff continuing to encourage and started behavioral plan 9/10 to provide patient with pizza 1x per week for cooperation with incontinence cares. Pt did not wear briefs evening of 9/14, more agreeable 9/15.     4) Unvaccinated for Covid-19, see interim history of note 8/19 by writer, attempted to discuss R/B/A of vaccination with patient, he did not demonstrate capacity, has no next of kin acting as surrogate decision maker, is awaiting guardianship, given patients age and medical co-morbidities the risks of receiving Covid vaccine are outweighed by the benefits, including patient is required to be vaccinated to be considered for discharge to some nursing home facilities which would be a more appropriate milieu and less restrictive environment for patient than locked inpatient psychiatric unit. Dr. Nickerson provided second opinion  "regarding administration of Covid vaccine on 8.20 and agrees with recommendation.   -Covid vaccine order placed 8/23, J&J vaccine given 8/24    5) On 9/17 Pt reported pain in \"heart and arms\", discussed with IM who evaluated patient given significant cardiac history, EKG completed and patient reported to IM he  was no longer having any pain/discomfort, see IM progress note 9/17 for complete details, agree with assessment and plan as outlined by IM    This note was created by undersigned using a Dragon dictation system. All typing errors or contextual distortion are unintentional and software inherent.     Disposition Plan   Reason for ongoing admission: is unable to care for self due to severe psychosis or mariusz and neurocognitive disorder   Discharge location: TBD, likely locked NH facility , unable to progress on discharge planning due to guardianship not being in place, see CTC notes  Discharge Medications: not ordered  Follow-up Appointments: not scheduled  Legal Status: Full MI commitment and Yanez     Entered by: Jeanette Dobbins on 9/29/2021 at 7:40 AM              "

## 2021-09-29 NOTE — PLAN OF CARE
Night Shift Summary (9/28/21 into 09/29/21)    Pt in bed sleeping at start of shift. Breathing quiet and unlabored. Appears to have slept 7 hours.    Assault, Elopement and Cheeking precautions in addition to Single Room order; any related events noted above.     Will continue to monitor and assess.       Problem: Behavioral Health Plan of Care  Goal: Absence of New-Onset Illness or Injury  Outcome: Improving     Problem: Sleep Disturbance  Goal: Adequate Sleep/Rest  Outcome: Improving

## 2021-09-29 NOTE — PROGRESS NOTES
Behavioral Health  Note   Behavioral Health  Spirituality Group Note     Unit 30    Name: John Juárez    YOB: 1965   MRN: [id number]    Age: 55 year old     Patient attended -led group, which included discussion of spirituality, coping with illness and building resilience.   Patient attended group for 1.0 hrs.   patient minimally participated, but was respectful to the group process.     Subhash [facility]  Staff    Page [phone]

## 2021-09-29 NOTE — PLAN OF CARE
Pt had 3-4 brief outbursts. He declined offer of PRN medication. He spent most of the evening watching t.v. in the lounge and responds to internal stimuli continuously.   He refused to brush his teeth or put on depends tonight.   New toothbrush provided several days ago has not been opened. Toothpaste appears unused.     Cooperative with meds.

## 2021-09-29 NOTE — PLAN OF CARE
"\"People are in my head and I couldn't sleep.\" Patient declined to come out for breakfast because he was tired. Ate a few bites of food when breakfast was brought to him. Had one short loud verbal outburst in the [facility] area nit directed at anyone as he was watching TV.Unable to recognize the content of outburst. Took shower before lunch.  "

## 2021-09-29 NOTE — PLAN OF CARE
"Problem: Psychotic Symptoms  Goal: Psychotic Symptoms  Description: Signs and symptoms of listed problems will be absent or manageable.  Outcome: No Change     John remained behaviorally in control this shift. He did not have any verbal outbursts and did not require PRN's. He responds to internal stimuli and laughs to himself. He endorses hearing voices and when asked if they bother him he states, \"oh yeah,\" which is actually different than what he usually reports. His mild oral TD sx remain the same and they continue to not be bothersome. He denies SI/HI.    Pt continues to be delusional, stating, \"I have mansions in [place] and all over. They got to let me out of this fucking place.\" Writer encouraged him to continue working on discharge with team and that we will help him find a place to live, pt states, \"okay.\"    Pt was compliant with meds and cheeking precautions. He is wearing briefs to bed d/t NOC incontinence. Pt spent time in the TV lounge and then went to sleep.  "

## 2021-09-29 NOTE — PLAN OF CARE
Assessment/Intervention/Current Symtoms and Care Coordination  -Chart review  -Pre round meeting with team  -Rounded with team, addressed patient needs/concerns  -Post round meeting with team  Current Symptoms include the following: Psychosis, disorganization and confusion.     Discharge Plan or Goal  Pending stabilization & development of a safe discharge plan.  Considerations include: locked behavorial unit in nursing home     Barriers to Discharge  The pt does not have a guardian and a guardian has not been found for this pt. Nursing homes are requiring a guardian.     Referral Status  locked behavioral unit in nursing home  no new outside referrals were made today      Legal Status  Patient is under MI commitment in [facility]

## 2021-09-30 PROCEDURE — 250N000013 HC RX MED GY IP 250 OP 250 PS 637: Performed by: PSYCHIATRY & NEUROLOGY

## 2021-09-30 PROCEDURE — 250N000013 HC RX MED GY IP 250 OP 250 PS 637: Performed by: PHYSICIAN ASSISTANT

## 2021-09-30 PROCEDURE — 124N000002 HC R&B MH UMMC

## 2021-09-30 RX ADMIN — POLYETHYLENE GLYCOL 3350 17 G: 17 POWDER, FOR SOLUTION ORAL at 10:17

## 2021-09-30 RX ADMIN — RIVASTIGMINE 1 PATCH: 4.6 PATCH TRANSDERMAL at 10:21

## 2021-09-30 RX ADMIN — MEMANTINE 10 MG: 5 TABLET ORAL at 10:14

## 2021-09-30 RX ADMIN — CHOLECALCIFEROL CAP 1.25 MG (50000 UNIT) 1250 MCG: 1.25 CAP at 10:18

## 2021-09-30 RX ADMIN — SALMETEROL XINAFOATE 1 PUFF: 50 POWDER, METERED ORAL; RESPIRATORY (INHALATION) at 10:15

## 2021-09-30 RX ADMIN — MELATONIN TAB 3 MG 3 MG: 3 TAB at 20:25

## 2021-09-30 RX ADMIN — DOCUSATE SODIUM 100 MG: 100 CAPSULE, LIQUID FILLED ORAL at 20:24

## 2021-09-30 RX ADMIN — LEVOTHYROXINE SODIUM 88 MCG: 88 TABLET ORAL at 10:14

## 2021-09-30 RX ADMIN — METOPROLOL TARTRATE 25 MG: 25 TABLET, FILM COATED ORAL at 20:24

## 2021-09-30 RX ADMIN — ASPIRIN 81 MG CHEWABLE TABLET 81 MG: 81 TABLET CHEWABLE at 10:14

## 2021-09-30 RX ADMIN — Medication 50 MCG: at 10:13

## 2021-09-30 RX ADMIN — GUAIFENESIN 600 MG: 600 TABLET, EXTENDED RELEASE ORAL at 10:13

## 2021-09-30 RX ADMIN — DOCUSATE SODIUM 100 MG: 100 CAPSULE, LIQUID FILLED ORAL at 10:14

## 2021-09-30 RX ADMIN — METOPROLOL TARTRATE 25 MG: 25 TABLET, FILM COATED ORAL at 10:13

## 2021-09-30 RX ADMIN — BENZTROPINE MESYLATE 1 MG: 1 TABLET ORAL at 10:14

## 2021-09-30 RX ADMIN — BENZTROPINE MESYLATE 1 MG: 1 TABLET ORAL at 20:23

## 2021-09-30 RX ADMIN — CLOZAPINE 50 MG: 50 TABLET ORAL at 10:13

## 2021-09-30 RX ADMIN — MEMANTINE 10 MG: 5 TABLET ORAL at 20:23

## 2021-09-30 RX ADMIN — SALMETEROL XINAFOATE 1 PUFF: 50 POWDER, METERED ORAL; RESPIRATORY (INHALATION) at 20:28

## 2021-09-30 RX ADMIN — ATORVASTATIN CALCIUM 80 MG: 80 TABLET, FILM COATED ORAL at 20:24

## 2021-09-30 RX ADMIN — CLOZAPINE 300 MG: 100 TABLET ORAL at 20:23

## 2021-09-30 RX ADMIN — GUAIFENESIN 600 MG: 600 TABLET, EXTENDED RELEASE ORAL at 20:23

## 2021-09-30 ASSESSMENT — ACTIVITIES OF DAILY LIVING (ADL)
LAUNDRY: UNABLE TO COMPLETE
ORAL_HYGIENE: PROMPTS;INDEPENDENT
DRESS: SCRUBS (BEHAVIORAL HEALTH);INDEPENDENT;PROMPTS
HYGIENE/GROOMING: INDEPENDENT;PROMPTS
DRESS: SCRUBS (BEHAVIORAL HEALTH)
HYGIENE/GROOMING: PROMPTS;INDEPENDENT
ORAL_HYGIENE: INDEPENDENT;PROMPTS
LAUNDRY: UNABLE TO COMPLETE

## 2021-09-30 NOTE — PLAN OF CARE
Night Shift Summary (9/29/21 into 09/30/21)    Pt in bed sleeping at start of shift. Breathing quiet and unlabored. Appears to have slept 7 hours.    Assault, Elopement and Cheeking precautions in addition to Single Room order; any related events noted above.     Will continue to monitor and assess.       Problem: Behavioral Health Plan of Care  Goal: Absence of New-Onset Illness or Injury  Outcome: Improving     Problem: Sleep Disturbance  Goal: Adequate Sleep/Rest  Outcome: Improving

## 2021-09-30 NOTE — PLAN OF CARE
Assessment/Intervention/Current Symtoms and Care Coordination  -Chart review  -Pre round meeting with team  -Rounded with team, addressed patient needs/concerns  -Post round meeting with team  Current Symptoms include the following: Psychosis, disorganization and confusion.     Discharge Plan or Goal  Pending stabilization & development of a safe discharge plan.  Considerations include: locked behavorial unit in nursing home     Barriers to Discharge  The pt does not have a guardian and a guardian has not been found for this pt. Nursing homes are requiring a guardian.     Referral Status  locked behavioral unit in nursing home  no new outside referrals were made today      Legal Status  Patient is under MI commitment in [facility]

## 2021-09-30 NOTE — PLAN OF CARE
Problem: Cognitive Impairment (Psychotic Signs/Symptoms)  Goal: Optimal Cognitive Function (Psychotic Signs/Symptoms)  Outcome: No Change  Intervention: Support and Promote Cognitive Ability       Problem: Behavioral Disturbance  Goal: Behavioral Disturbance  Outcome: No Change     Patient is labile today.  At initial approach he was irritable saying he wanted to sleep.  He is mostly withdrawn, confused, and pleasant throughout the shift.  He did have brief episode of incoherent yelling but stopped this quickly.  Patient does appear to be responding to internal stimuli.  He is observed starring blankly and talking to self.  Patient is medication compliant and remains safe on unit.  Will continue to monitor.  [place] RN

## 2021-10-01 LAB
BASOPHILS # BLD AUTO: 0.1 10E3/UL (ref 0–0.2)
BASOPHILS NFR BLD AUTO: 2 %
EOSINOPHIL # BLD AUTO: 0.7 10E3/UL (ref 0–0.7)
EOSINOPHIL NFR BLD AUTO: 16 %
ERYTHROCYTE [DISTWIDTH] IN BLOOD BY AUTOMATED COUNT: 13.1 % (ref 10–15)
HCT VFR BLD AUTO: 47.4 % (ref 40–53)
HGB BLD-MCNC: 15.7 G/DL (ref 13.3–17.7)
IMM GRANULOCYTES # BLD: 0 10E3/UL
IMM GRANULOCYTES NFR BLD: 0 %
LYMPHOCYTES # BLD AUTO: 1.3 10E3/UL (ref 0.8–5.3)
LYMPHOCYTES NFR BLD AUTO: 31 %
MCH RBC QN AUTO: 32.6 PG (ref 26.5–33)
MCHC RBC AUTO-ENTMCNC: 33.1 G/DL (ref 31.5–36.5)
MCV RBC AUTO: 99 FL (ref 78–100)
MONOCYTES # BLD AUTO: 0.3 10E3/UL (ref 0–1.3)
MONOCYTES NFR BLD AUTO: 6 %
NEUTROPHILS # BLD AUTO: 1.9 10E3/UL (ref 1.6–8.3)
NEUTROPHILS NFR BLD AUTO: 45 %
NRBC # BLD AUTO: 0 10E3/UL
NRBC BLD AUTO-RTO: 0 /100
PLATELET # BLD AUTO: 190 10E3/UL (ref 150–450)
RBC # BLD AUTO: 4.81 10E6/UL (ref 4.4–5.9)
WBC # BLD AUTO: 4.2 10E3/UL (ref 4–11)

## 2021-10-01 PROCEDURE — 250N000013 HC RX MED GY IP 250 OP 250 PS 637: Performed by: PSYCHIATRY & NEUROLOGY

## 2021-10-01 PROCEDURE — 36416 COLLJ CAPILLARY BLOOD SPEC: CPT | Performed by: PSYCHIATRY & NEUROLOGY

## 2021-10-01 PROCEDURE — 250N000013 HC RX MED GY IP 250 OP 250 PS 637: Performed by: PHYSICIAN ASSISTANT

## 2021-10-01 PROCEDURE — 99231 SBSQ HOSP IP/OBS SF/LOW 25: CPT | Performed by: PSYCHIATRY & NEUROLOGY

## 2021-10-01 PROCEDURE — 85025 COMPLETE CBC W/AUTO DIFF WBC: CPT | Performed by: PSYCHIATRY & NEUROLOGY

## 2021-10-01 PROCEDURE — 124N000002 HC R&B MH UMMC

## 2021-10-01 RX ORDER — RIVASTIGMINE 9.5 MG/24H
1 PATCH, EXTENDED RELEASE TRANSDERMAL DAILY
Status: DISCONTINUED | OUTPATIENT
Start: 2021-10-01 | End: 2021-10-18

## 2021-10-01 RX ADMIN — METOPROLOL TARTRATE 25 MG: 25 TABLET, FILM COATED ORAL at 21:12

## 2021-10-01 RX ADMIN — RIVASTIGMINE 1 PATCH: 4.6 PATCH TRANSDERMAL at 09:01

## 2021-10-01 RX ADMIN — Medication 50 MCG: at 09:00

## 2021-10-01 RX ADMIN — MEMANTINE 10 MG: 5 TABLET ORAL at 09:00

## 2021-10-01 RX ADMIN — MEMANTINE 10 MG: 5 TABLET ORAL at 21:12

## 2021-10-01 RX ADMIN — ATORVASTATIN CALCIUM 80 MG: 80 TABLET, FILM COATED ORAL at 21:11

## 2021-10-01 RX ADMIN — SALMETEROL XINAFOATE 1 PUFF: 50 POWDER, METERED ORAL; RESPIRATORY (INHALATION) at 09:26

## 2021-10-01 RX ADMIN — POLYETHYLENE GLYCOL 3350 17 G: 17 POWDER, FOR SOLUTION ORAL at 09:01

## 2021-10-01 RX ADMIN — GUAIFENESIN 600 MG: 600 TABLET, EXTENDED RELEASE ORAL at 09:00

## 2021-10-01 RX ADMIN — SALMETEROL XINAFOATE 1 PUFF: 50 POWDER, METERED ORAL; RESPIRATORY (INHALATION) at 21:32

## 2021-10-01 RX ADMIN — ASPIRIN 81 MG CHEWABLE TABLET 81 MG: 81 TABLET CHEWABLE at 09:00

## 2021-10-01 RX ADMIN — CLOZAPINE 300 MG: 100 TABLET ORAL at 21:12

## 2021-10-01 RX ADMIN — GUAIFENESIN 600 MG: 600 TABLET, EXTENDED RELEASE ORAL at 21:11

## 2021-10-01 RX ADMIN — LEVOTHYROXINE SODIUM 88 MCG: 88 TABLET ORAL at 09:00

## 2021-10-01 RX ADMIN — CLOZAPINE 50 MG: 50 TABLET ORAL at 09:00

## 2021-10-01 RX ADMIN — RIVASTIGMINE 1 PATCH: 9.5 PATCH, EXTENDED RELEASE TRANSDERMAL at 21:15

## 2021-10-01 RX ADMIN — BENZTROPINE MESYLATE 1 MG: 1 TABLET ORAL at 09:01

## 2021-10-01 RX ADMIN — METOPROLOL TARTRATE 25 MG: 25 TABLET, FILM COATED ORAL at 09:00

## 2021-10-01 RX ADMIN — BENZTROPINE MESYLATE 1 MG: 1 TABLET ORAL at 21:12

## 2021-10-01 RX ADMIN — DOCUSATE SODIUM 100 MG: 100 CAPSULE, LIQUID FILLED ORAL at 21:11

## 2021-10-01 RX ADMIN — DOCUSATE SODIUM 100 MG: 100 CAPSULE, LIQUID FILLED ORAL at 08:59

## 2021-10-01 ASSESSMENT — ACTIVITIES OF DAILY LIVING (ADL)
DRESS: INDEPENDENT
ORAL_HYGIENE: INDEPENDENT;PROMPTS
HYGIENE/GROOMING: INDEPENDENT;PROMPTS

## 2021-10-01 NOTE — PLAN OF CARE
Problem: Psychotic Symptoms  Goal: Psychotic Symptoms  Description: Signs and symptoms of listed problems will be absent or manageable.  Outcome: Improving  Flowsheets (Taken 9/30/2021 2149)  Psychotic Symptoms Assessed:   anxiety   insight   suicidality   self injury   affect   mood   speech   orientation  Psychotic Symptoms Present:   insight   orientation   affect   speech  The patient alternated between his room and the lounge and kept to himself. He presented with a flat, blunted, and at times tense affect. His concentration and insight appeared poor, judgment impaired, and speech incoherent. There were no signs of SI/SIB, or anxiety but he seemed to be responding to internal stimuli as he was observed mumbling to himself several times. He was medication compliant, ate meals and snacks, and watched television in the lounge. He was also given Melatonin 3 mg PRN with his HS medications.

## 2021-10-01 NOTE — PLAN OF CARE
Problem: Psychotic Symptoms  Goal: Psychotic Symptoms  Description: Signs and symptoms of listed problems will be absent or manageable.  Outcome: No Change    Assessed mood, anxiety, thoughts, and behavior.  Pt is oriented to self only.  Affect is flat blunted, mood is irritable.  Pt visible in the lounge area, socially withdrawn.  Pt denies auditory and visual hallucinations, appears responding observed talking to himself.  Pt was medication compliant.  Good appetite ate meals.  Will continue to assess.

## 2021-10-01 NOTE — PLAN OF CARE
Problem: Behavioral Health Plan of Care  Goal: Plan of Care Review  Outcome: Improving  Flowsheets (Taken 10/1/2021 1712)  Plan of Care Reviewed With: patient  Patient Agreement with Plan of Care: agrees     Problem: Psychotic Symptoms  Goal: Psychotic Symptoms  Description: Signs and symptoms of listed problems will be absent or manageable.  Outcome: Improving     Problem: Behavioral Disturbance  Goal: Behavioral Disturbance  Description: Signs and symptoms of listed problems will be absent or manageable by discharge or transition of care.  Outcome: Improving     Problem: Behavioral Health Plan of Care  Goal: Adheres to Safety Considerations for Self and Others  Intervention: Develop and Maintain Individualized Safety Plan  Recent Flowsheet Documentation  Taken 10/1/2021 1712 by Sandie Lewis, RN  Safety Measures:    environmental rounds completed    safety rounds completed  Goal: Absence of New-Onset Illness or Injury  Intervention: Identify and Manage Fall Risk  Recent Flowsheet Documentation  Taken 10/1/2021 1712 by Sandie Lewis, RN  Safety Measures:    environmental rounds completed    safety rounds completed    Patient is isolative to self but visible on the unit, watching TV, blunted and flat affect. Cooperative with assessment. Patient's speech unclear, but understandable.     Denies any psych symptoms.     Reports having no concerns or questions.    Medication compliant.  Staff monitoring patient closely.

## 2021-10-01 NOTE — PROGRESS NOTES
PSYCHIATRIC PROGRESS NOTE    Admission Date: 06/30/2021  Date of Service: 10/01/2021    The patient was seen, his chart reviewed, his case discussed with staff at the Team Meeting.  No significant changes noted in his mental status or attitude since I saw him last time about a month ago. He has been spending time in the lounge talking to himself but not socializing with peers. He has been medication compliant. No guardian has been found so far which continues to complicate appropriate NH placement.    This is a 55-year-old single white male with a lifelong history of mental illness, TBI, neurocognitive disorder and polysubstance abuse. He has a history of 3 MI commitments and one CD commitment. This time he also was committed and Jarvised. He had spent several months at [facility] and was transferred here after being seen by Dr. Wilks for psychiatric consultation. He was initially seen and followed by Dr. Morfin and has been followed by Dr. Dobbins since 07/08/21. He has been under my care since 07/14/21. His scheduled Risperdal was discontinued and he was started on Clozaril since his Yanez was amended. Subsequently I started him on Namenda and Exelon Patch. His Clozaril dose was slightly increased. He seems to have tolerated his medications well.    MEDICATIONS   Clozaril 50 mg QAM and 300 mg at bedtime   Namenda 10 mg BID   Cogentin 1 mg BID   Exelon Patch 4.6 mg Q24H   Risperdal M-Tab 1 mg Q6H PRN   Melatonin 3 mg HS PRN     LABS: His CBC this morning was entirely WNL. His was SARS CoV2 PCR negative on 09/28/21    VS: Pulse - 93, BP - 110/84, T - 97.7, Resp - 16, SpO2 - 95%    MENTAL STATUS EXAMINATION   Revealed a normally built and normally developed 55-year-old white male appearing older than his stated age. He presented with involuntary tongue movement that may appear as if he was talking to himself. His speech was more coherent and he articulated better.. He presented with  paucity of ideas. His affect was blunted. He denied SI and HI. He was alert but oriented to self only. He denied hallucinations but appeared to be responding to internal stimuli. He had no insight into his problems and his judgement was impaired. He continued to present with marked impairment of his memory and cognition.     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 Continue the rest of medications without change.    Juarez Nickerson MD

## 2021-10-01 NOTE — PLAN OF CARE
Night Shift Summary (9/30/21 into 10/01/21)    Pt in bed sleeping at start of shift. Breathing quiet and unlabored. Appears to have slept 7 hours.    Assault, Elopement and Cheeking precautions in addition to Single Room order; any related events noted above.     Will continue to monitor and assess.       Problem: Behavioral Health Plan of Care  Goal: Absence of New-Onset Illness or Injury  Outcome: Improving     Problem: Sleep Disturbance  Goal: Adequate Sleep/Rest  Outcome: Improving

## 2021-10-01 NOTE — PLAN OF CARE
Assessment/Intervention/Current Symtoms and Care Coordination  -Chart review  -Pre round meeting with team  -Rounded with team, addressed patient needs/concerns  -Post round meeting with team  Current Symptoms include the following: Psychosis, disorganization and confusion.     Discharge Plan or Goal  Pending stabilization & development of a safe discharge plan.  Considerations include: locked behavorial unit in nursing home     Barriers to Discharge  The pt does not have a guardian and a guardian has not been found for this pt. Nursing homes are requiring a guardian.     Referral Status  locked behavioral unit in nursing home  no new outside referrals were made today      Legal Status  Patient is under MI commitment in [facility]

## 2021-10-02 PROCEDURE — 250N000013 HC RX MED GY IP 250 OP 250 PS 637: Performed by: PSYCHIATRY & NEUROLOGY

## 2021-10-02 PROCEDURE — 250N000013 HC RX MED GY IP 250 OP 250 PS 637: Performed by: PHYSICIAN ASSISTANT

## 2021-10-02 PROCEDURE — 124N000002 HC R&B MH UMMC

## 2021-10-02 RX ADMIN — RIVASTIGMINE 1 PATCH: 9.5 PATCH, EXTENDED RELEASE TRANSDERMAL at 09:00

## 2021-10-02 RX ADMIN — METOPROLOL TARTRATE 25 MG: 25 TABLET, FILM COATED ORAL at 21:00

## 2021-10-02 RX ADMIN — POLYETHYLENE GLYCOL 3350 17 G: 17 POWDER, FOR SOLUTION ORAL at 08:56

## 2021-10-02 RX ADMIN — Medication 50 MCG: at 08:57

## 2021-10-02 RX ADMIN — DOCUSATE SODIUM 100 MG: 100 CAPSULE, LIQUID FILLED ORAL at 08:57

## 2021-10-02 RX ADMIN — ASPIRIN 81 MG CHEWABLE TABLET 81 MG: 81 TABLET CHEWABLE at 08:57

## 2021-10-02 RX ADMIN — ATORVASTATIN CALCIUM 80 MG: 80 TABLET, FILM COATED ORAL at 20:59

## 2021-10-02 RX ADMIN — BENZTROPINE MESYLATE 1 MG: 1 TABLET ORAL at 20:59

## 2021-10-02 RX ADMIN — CLOZAPINE 300 MG: 100 TABLET ORAL at 21:00

## 2021-10-02 RX ADMIN — MEMANTINE 10 MG: 5 TABLET ORAL at 08:57

## 2021-10-02 RX ADMIN — SALMETEROL XINAFOATE 1 PUFF: 50 POWDER, METERED ORAL; RESPIRATORY (INHALATION) at 08:57

## 2021-10-02 RX ADMIN — MEMANTINE 10 MG: 5 TABLET ORAL at 21:00

## 2021-10-02 RX ADMIN — BENZTROPINE MESYLATE 1 MG: 1 TABLET ORAL at 08:57

## 2021-10-02 RX ADMIN — LEVOTHYROXINE SODIUM 88 MCG: 88 TABLET ORAL at 08:57

## 2021-10-02 RX ADMIN — CLOZAPINE 50 MG: 50 TABLET ORAL at 08:57

## 2021-10-02 RX ADMIN — METOPROLOL TARTRATE 25 MG: 25 TABLET, FILM COATED ORAL at 08:57

## 2021-10-02 RX ADMIN — GUAIFENESIN 600 MG: 600 TABLET, EXTENDED RELEASE ORAL at 08:57

## 2021-10-02 RX ADMIN — SALMETEROL XINAFOATE 1 PUFF: 50 POWDER, METERED ORAL; RESPIRATORY (INHALATION) at 21:00

## 2021-10-02 RX ADMIN — GUAIFENESIN 600 MG: 600 TABLET, EXTENDED RELEASE ORAL at 21:00

## 2021-10-02 RX ADMIN — DOCUSATE SODIUM 100 MG: 100 CAPSULE, LIQUID FILLED ORAL at 20:59

## 2021-10-02 ASSESSMENT — ACTIVITIES OF DAILY LIVING (ADL)
LAUNDRY: UNABLE TO COMPLETE
DRESS: SCRUBS (BEHAVIORAL HEALTH)
ORAL_HYGIENE: PROMPTS
HYGIENE/GROOMING: PROMPTS

## 2021-10-02 NOTE — PLAN OF CARE
Night Shift Summary (10/1/21 into 10/02/21)    Pt in bed sleeping at start of shift. Breathing quiet and unlabored. Appears to have slept 7 hours.    Assault, Elopement and Cheeking precautions in addition to Single Room order; any related events noted above.     Will continue to monitor and assess.       Problem: Behavioral Health Plan of Care  Goal: Absence of New-Onset Illness or Injury  Outcome: Improving     Problem: Sleep Disturbance  Goal: Adequate Sleep/Rest  Outcome: Improving

## 2021-10-02 NOTE — PLAN OF CARE
Problem: Cognitive Impairment (Psychotic Signs/Symptoms)  Goal: Optimal Cognitive Function (Psychotic Signs/Symptoms)  Outcome: No Change    Pt is oriented to self only.  Affect is flat blunted, mood is irritable at times.  Pt denies auditory and visual hallucination.  Pt was incontinent of stool this morning, bed linen changed.  Pt refused to shower.  Pt is eating his meals and was med compliant.  Will continue to assess.

## 2021-10-03 PROCEDURE — 250N000013 HC RX MED GY IP 250 OP 250 PS 637: Performed by: PSYCHIATRY & NEUROLOGY

## 2021-10-03 PROCEDURE — 250N000013 HC RX MED GY IP 250 OP 250 PS 637: Performed by: PHYSICIAN ASSISTANT

## 2021-10-03 PROCEDURE — 124N000002 HC R&B MH UMMC

## 2021-10-03 RX ADMIN — MEMANTINE 10 MG: 5 TABLET ORAL at 09:08

## 2021-10-03 RX ADMIN — SALMETEROL XINAFOATE 1 PUFF: 50 POWDER, METERED ORAL; RESPIRATORY (INHALATION) at 20:28

## 2021-10-03 RX ADMIN — SALMETEROL XINAFOATE 1 PUFF: 50 POWDER, METERED ORAL; RESPIRATORY (INHALATION) at 09:09

## 2021-10-03 RX ADMIN — BENZTROPINE MESYLATE 1 MG: 1 TABLET ORAL at 09:09

## 2021-10-03 RX ADMIN — BENZTROPINE MESYLATE 1 MG: 1 TABLET ORAL at 20:28

## 2021-10-03 RX ADMIN — GUAIFENESIN 600 MG: 600 TABLET, EXTENDED RELEASE ORAL at 20:27

## 2021-10-03 RX ADMIN — DOCUSATE SODIUM 100 MG: 100 CAPSULE, LIQUID FILLED ORAL at 20:27

## 2021-10-03 RX ADMIN — DOCUSATE SODIUM 100 MG: 100 CAPSULE, LIQUID FILLED ORAL at 09:08

## 2021-10-03 RX ADMIN — Medication 50 MCG: at 09:15

## 2021-10-03 RX ADMIN — MEMANTINE 10 MG: 5 TABLET ORAL at 20:28

## 2021-10-03 RX ADMIN — RIVASTIGMINE 1 PATCH: 9.5 PATCH, EXTENDED RELEASE TRANSDERMAL at 09:09

## 2021-10-03 RX ADMIN — POLYETHYLENE GLYCOL 3350 17 G: 17 POWDER, FOR SOLUTION ORAL at 09:09

## 2021-10-03 RX ADMIN — CLOZAPINE 50 MG: 50 TABLET ORAL at 09:08

## 2021-10-03 RX ADMIN — LEVOTHYROXINE SODIUM 88 MCG: 88 TABLET ORAL at 09:08

## 2021-10-03 RX ADMIN — METOPROLOL TARTRATE 25 MG: 25 TABLET, FILM COATED ORAL at 09:08

## 2021-10-03 RX ADMIN — METOPROLOL TARTRATE 25 MG: 25 TABLET, FILM COATED ORAL at 20:28

## 2021-10-03 RX ADMIN — ASPIRIN 81 MG CHEWABLE TABLET 81 MG: 81 TABLET CHEWABLE at 09:09

## 2021-10-03 RX ADMIN — GUAIFENESIN 600 MG: 600 TABLET, EXTENDED RELEASE ORAL at 09:08

## 2021-10-03 RX ADMIN — CLOZAPINE 300 MG: 100 TABLET ORAL at 20:27

## 2021-10-03 RX ADMIN — ATORVASTATIN CALCIUM 80 MG: 80 TABLET, FILM COATED ORAL at 20:28

## 2021-10-03 NOTE — PLAN OF CARE
"Problem: Behavioral Disturbance  Goal: Behavioral Disturbance  Description: Signs and symptoms of listed problems will be absent or manageable by discharge or transition of care.  Outcome: No Change     John remains behaviorally in control. He engages in self-talk, laughs to himself, and stares off into the distance rather than looking at Writer. He has mild oral TD sx that are not bothersome to him, per baseline. Pt endorses hearing voices, but \"they're okay.\" Pt hears voices at baseline. Pt is calm and cooperative with staff. He had no outbursts this shift.    Pt spends most of shift in TV lounge. He is withdrawn and does not engage with others. Pt refused a shower this shift, just as he did on day shift. Pt states, \"tomorrow.\"     Pt is compliant with meds and cheeking precautions. Pt went to sleep before Writer could offer briefs so it is unclear if he is wearing them currently.   "

## 2021-10-03 NOTE — PLAN OF CARE
Problem: Psychotic Symptoms  Goal: Psychotic Symptoms  Description: Signs and symptoms of listed problems will be absent or manageable.  Outcome: No Change       Affect remains flat blunted.  Pt endorses some auditory hallucination.  Denies depression and anxiety.  Pt is socially withdrawn,  continues to engage in self talk.  No irritability or outbursts this shift.  Pt was incontinent of urine this morning, linen changed and pt was prompted to shower.  Pt did shower.  Please continue to encourage pt to wear briefs at HS.

## 2021-10-03 NOTE — PROVIDER NOTIFICATION
10/03/21 0621   Sleep/Rest/Relaxation   Sleep/Rest/Relaxation (WDL) WDL   Sleep/Rest/Relaxation appears asleep   Night Time # Hours 7 hours     NOC Shift Report    Pt in bed at beginning of shift, breathing quiet and unlabored. Pt slept through shift. Pt slept 7 hours.     No pt complaints or concerns at this time. No PRNs given. Will continue to monitor.

## 2021-10-03 NOTE — PLAN OF CARE
"Problem: Behavioral Disturbance  Goal: Behavioral Disturbance  Description: Signs and symptoms of listed problems will be absent or manageable by discharge or transition of care.  Outcome: No Change     John continues to be behaviorally in control. He was responding to internal stimuli significantly tonight; it appeared to be so distracting he rarely responded to Writer's questions. He engages in self talk and laughs to himself. He is hearing voices that do not bother him, per baseline. He has mild oral TD sx that do not affect him, per baseline. No outbursts or concerns this shift.    Pt is compliant with cheeking precautions and is medication compliant. Pt refused to put on briefs. Writer reminded him that he may get the option to order pizza if he puts on the briefs, and also reminded him that he was incontinent of stool/urine this morning. Pt states, \"I don't need no fucking diaper and I don't need no fucking pizza. You can't make me wear those.\" Briefs placed on bed if he changes his mind.     Pt then went to sleep and is currently sleeping in his room.  "

## 2021-10-04 PROCEDURE — 124N000002 HC R&B MH UMMC

## 2021-10-04 PROCEDURE — 250N000013 HC RX MED GY IP 250 OP 250 PS 637: Performed by: PSYCHIATRY & NEUROLOGY

## 2021-10-04 PROCEDURE — 99232 SBSQ HOSP IP/OBS MODERATE 35: CPT | Performed by: PSYCHIATRY & NEUROLOGY

## 2021-10-04 PROCEDURE — 250N000013 HC RX MED GY IP 250 OP 250 PS 637: Performed by: PHYSICIAN ASSISTANT

## 2021-10-04 RX ADMIN — METOPROLOL TARTRATE 25 MG: 25 TABLET, FILM COATED ORAL at 20:21

## 2021-10-04 RX ADMIN — BENZTROPINE MESYLATE 1 MG: 1 TABLET ORAL at 20:20

## 2021-10-04 RX ADMIN — CLOZAPINE 300 MG: 100 TABLET ORAL at 20:20

## 2021-10-04 RX ADMIN — SALMETEROL XINAFOATE 1 PUFF: 50 POWDER, METERED ORAL; RESPIRATORY (INHALATION) at 20:20

## 2021-10-04 RX ADMIN — RIVASTIGMINE 1 PATCH: 9.5 PATCH, EXTENDED RELEASE TRANSDERMAL at 10:04

## 2021-10-04 RX ADMIN — Medication 50 MCG: at 10:05

## 2021-10-04 RX ADMIN — LEVOTHYROXINE SODIUM 88 MCG: 88 TABLET ORAL at 10:01

## 2021-10-04 RX ADMIN — POLYETHYLENE GLYCOL 3350 17 G: 17 POWDER, FOR SOLUTION ORAL at 10:03

## 2021-10-04 RX ADMIN — GUAIFENESIN 600 MG: 600 TABLET, EXTENDED RELEASE ORAL at 20:21

## 2021-10-04 RX ADMIN — BENZTROPINE MESYLATE 1 MG: 1 TABLET ORAL at 09:55

## 2021-10-04 RX ADMIN — ATORVASTATIN CALCIUM 80 MG: 80 TABLET, FILM COATED ORAL at 20:20

## 2021-10-04 RX ADMIN — DOCUSATE SODIUM 100 MG: 100 CAPSULE, LIQUID FILLED ORAL at 20:20

## 2021-10-04 RX ADMIN — CLOZAPINE 50 MG: 50 TABLET ORAL at 10:00

## 2021-10-04 RX ADMIN — DOCUSATE SODIUM 100 MG: 100 CAPSULE, LIQUID FILLED ORAL at 10:00

## 2021-10-04 RX ADMIN — SALMETEROL XINAFOATE 1 PUFF: 50 POWDER, METERED ORAL; RESPIRATORY (INHALATION) at 10:04

## 2021-10-04 RX ADMIN — MEMANTINE 10 MG: 5 TABLET ORAL at 10:01

## 2021-10-04 RX ADMIN — METOPROLOL TARTRATE 25 MG: 25 TABLET, FILM COATED ORAL at 10:03

## 2021-10-04 RX ADMIN — MEMANTINE 10 MG: 5 TABLET ORAL at 20:21

## 2021-10-04 RX ADMIN — GUAIFENESIN 600 MG: 600 TABLET, EXTENDED RELEASE ORAL at 10:00

## 2021-10-04 RX ADMIN — ASPIRIN 81 MG CHEWABLE TABLET 81 MG: 81 TABLET CHEWABLE at 09:55

## 2021-10-04 ASSESSMENT — ACTIVITIES OF DAILY LIVING (ADL)
DRESS: SCRUBS (BEHAVIORAL HEALTH)
HYGIENE/GROOMING: PROMPTS
ORAL_HYGIENE: PROMPTS

## 2021-10-04 NOTE — PLAN OF CARE
Problem: Psychotic Symptoms  Goal: Psychotic Symptoms  Description: Signs and symptoms of listed problems will be absent or manageable.  Outcome: No Change   Patient has been sleeping resting in bed most the shift. Denies depression, anxiety and hallucinations, (appears to be hallucinating,) stares into space and will be talking to self. Denies suicidal thoughts. Declined breakfast, ate lunch in the lounge. This morning agreed to take a shower and afterwards when out in lounge started shouting he did not want to take a shower.

## 2021-10-04 NOTE — PROGRESS NOTES
"[facility], [place]   Psychiatric Progress Note        Interim History:   The patient's care was discussed with the treatment team during the daily team meeting and/or staff's chart notes were reviewed.  Staff report patient has been incontinent and resistant to wearing briefs. A lot of \"self talk.\"     The patient reports that he is \"fine.\" Denies problems with sleep or appetite. Denies SI or HI. Does endorse AH that are of command nature at times. Says that his weekend was \"okay.\"          Medications:       aspirin  81 mg Oral Daily     atorvastatin  80 mg Oral At Bedtime     benztropine  1 mg Oral BID     cholecalciferol  1,250 mcg Oral Q7 Days     cloZAPine  300 mg Oral At Bedtime     cloZAPine  50 mg Oral Daily     docusate sodium  100 mg Oral BID     guaiFENesin  600 mg Oral BID     levothyroxine  88 mcg Oral Daily     memantine  10 mg Oral BID     metoprolol tartrate  25 mg Oral BID     polyethylene glycol  17 g Oral Daily     rivastigmine  1 patch Transdermal Daily     rivastigmine   Transdermal Q8H     salmeterol  1 puff Inhalation BID     vitamin D3  50 mcg Oral Daily          Allergies:     Allergies   Allergen Reactions     Ibuprofen      Latex           Labs:   No results found for this or any previous visit (from the past 24 hour(s)).       Psychiatric Examination:     /71 (BP Location: Right arm)   Pulse 80   Temp 97.3  F (36.3  C) (Oral)   Resp 16   Ht 1.829 m (6')   Wt 89.3 kg (196 lb 14.4 oz)   SpO2 97%   BMI 26.70 kg/m    Weight is 196 lbs 14.4 oz  Body mass index is 26.7 kg/m .  Orthostatic Vitals       Most Recent      Lying Orthostatic /71 10/04 1011    Lying Orthostatic Pulse (bpm) 80 10/04 1011            Appearance: awake, alert and adequately groomed  Attitude:  cooperative  Eye Contact:  fair  Mood:  good  Affect:  intensity is blunted  Speech:  paucity of speech  Psychomotor Behavior:  evidence of tardive dyskinesia  Thought Process:  " logical  Associations:  no loose associations  Thought Content:  no evidence of suicidal ideation or homicidal ideation and auditory hallucinations present  Insight:  limited  Judgement:  limited  Oriented to:  person and place  Attention Span and Concentration:  fair  Recent and Remote Memory:  poor    Clinical Global Impressions  First:  Considering your total clinical experience with this particular patient population, how severe are the patient's symptoms at this time?: 7 (07/01/21 0630)  Compared to the patient's condition at the START of treatment, this patient's condition is: 4 (07/01/21 0630)  Most recent:  Considering your total clinical experience with this particular patient population, how severe are the patient's symptoms at this time?: 4 (10/04/21 1307)  Compared to the patient's condition at the START of treatment, this patient's condition is: 2 (10/04/21 1307)         Precautions:     Behavioral Orders   Procedures     Assault precautions     Cheeking Precautions (behavioral units)     Patient Observed swallowing PO medications; Patient asked to drink water after swallowing medication; Patient in Staff line of sight for 15 minutes after medication given; Mouth checks after PO administration (patient asked to open mouth and stick out their tongue).     Code 1     Elopement precautions     Routine Programming     As clinically indicated     Single Room     Status 15     Every 15 minutes.          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   Stimulant use disorder, in remission.          Plan:     1) Continue Clozaril at current dose. May titrate further.   2) CTC working on options for placement.       Disposition Plan   Reason for ongoing admission: is unable to care for self due to severe psychosis or mariusz  Discharge  location: TBD  Discharge Medications: not ordered  Follow-up Appointments: not scheduled  Legal Status: full commitment    Entered by: Wilton Morfin on October 4, 2021 at 1:08 PM

## 2021-10-04 NOTE — PLAN OF CARE
Assessment/Intervention/Current Symtoms and Care Coordination  -Chart review  -Pre round meeting with team  -Rounded with team, addressed patient needs/concerns  -Post round meeting with team  Current Symptoms include the following: Psychosis, disorganization and confusion.     Discharge Plan or Goal  Pending stabilization & development of a safe discharge plan.  Considerations include: locked behavorial unit in nursing home     Barriers to Discharge  The pt does not have a guardian and a guardian has not been found for this pt. Nursing homes are requiring a guardian.     Referral Status  locked behavioral unit in nursing home  no new outside referrals were made today      Legal Status  Patient is under MI commitment in [facility]

## 2021-10-04 NOTE — PLAN OF CARE
Patient Education     When to Use Antibiotics    Antibiotics are medicines used to treat infections caused by bacteria. They don’t work for an illness caused by a virus. And they don't work for an allergic reaction. In fact, taking antibiotics for reasons other than an infection by bacteria can cause problems. You may have side effects from the medicine. And if you need an antibiotic in the future, it may not work well. This is because the bacteria can become immune to the medicine. You can also get a type of diarrhea that's hard to treat. This diarrhea is called C. diff.   When antibiotics likely won’t help  Your healthcare provider won’t usually give you antibiotics for the conditions listed below. You can help by not asking for them if you have:   · A cold. This type of illness is caused by a virus. It can cause a runny nose, stuffed-up nose, sneezing, coughing, and headache. You may also have mild body aches and low fever. A cold gets better on its own in a few days to a week.  · The flu (influenza). This is a respiratory illness caused by a virus. The flu usually goes away on its own in a week or so. It can cause fever, body aches, sore throat, and tiredness.  · Bronchitis. This is an infection in the lungs. It is most often caused by a virus. You may have coughing, phlegm, body aches, and a low fever. A common type of bronchitis is known as a chest cold. This is called acute bronchitis. This often happens after you have a respiratory infection like a cold. Bronchitis can take weeks to go away. Antibiotics often don’t help.  · Most sore throats. Sore throats are most often caused by viruses. Your throat may feel scratchy or achy. It may hurt to swallow. You may also have a low fever and body aches. A sore throat usually gets better in a few days.  · Most outer ear infections. An ear infection may be caused by a virus or bacteria. It causes pain in the ear. Antibiotics by mouth usually don’t help. Low-dose  Phone call to Benjamín, confirmed he is requesting amlodipine 10 mg one tablet daily.  Yes.   Ike Perez RN     Pt attended some of the Therapeutic Recreation group this evening. Pt participated in a group activity as a healthy outlet to gain self-esteem, manage behaviors, improve social skills, and decrease isolation.  Pt entered group late and participated for approximately x10 minutes total. Pt left the group room on a couple occasions and had to leave to talk with a unit nurse. While pt was in the group, he had a difficult time participating in the activity, due to leaving often. Pt also seemed distracted, possibly responding to internal stimuli.    [id number] [employer]. [address] [place] [address]. All rights reserved. This information is not intended as a substitute for professional medical care. Always follow your healthcare professional's instructions.

## 2021-10-04 NOTE — PLAN OF CARE
Night Shift Summary (10/3/21 into 10/04/21)    Pt in bed sleeping at start of shift. Breathing quiet and unlabored. Appears to have slept 7 hours.    Assault, Elopement and Cheeking precautions in addition to No Roommate order; any related events noted above.     Will continue to monitor and assess.       Problem: Behavioral Health Plan of Care  Goal: Absence of New-Onset Illness or Injury  Outcome: Improving     Problem: Sleep Disturbance  Goal: Adequate Sleep/Rest  Outcome: Improving

## 2021-10-05 PROCEDURE — 250N000013 HC RX MED GY IP 250 OP 250 PS 637: Performed by: PSYCHIATRY & NEUROLOGY

## 2021-10-05 PROCEDURE — 250N000013 HC RX MED GY IP 250 OP 250 PS 637: Performed by: PHYSICIAN ASSISTANT

## 2021-10-05 PROCEDURE — 124N000002 HC R&B MH UMMC

## 2021-10-05 RX ADMIN — Medication 50 MCG: at 09:05

## 2021-10-05 RX ADMIN — GUAIFENESIN 600 MG: 600 TABLET, EXTENDED RELEASE ORAL at 21:18

## 2021-10-05 RX ADMIN — BENZTROPINE MESYLATE 1 MG: 1 TABLET ORAL at 09:05

## 2021-10-05 RX ADMIN — SALMETEROL XINAFOATE 1 PUFF: 50 POWDER, METERED ORAL; RESPIRATORY (INHALATION) at 09:04

## 2021-10-05 RX ADMIN — SALMETEROL XINAFOATE 1 PUFF: 50 POWDER, METERED ORAL; RESPIRATORY (INHALATION) at 21:18

## 2021-10-05 RX ADMIN — GUAIFENESIN 600 MG: 600 TABLET, EXTENDED RELEASE ORAL at 09:04

## 2021-10-05 RX ADMIN — LEVOTHYROXINE SODIUM 88 MCG: 88 TABLET ORAL at 09:05

## 2021-10-05 RX ADMIN — BENZTROPINE MESYLATE 1 MG: 1 TABLET ORAL at 21:18

## 2021-10-05 RX ADMIN — DOCUSATE SODIUM 100 MG: 100 CAPSULE, LIQUID FILLED ORAL at 09:05

## 2021-10-05 RX ADMIN — CLOZAPINE 50 MG: 50 TABLET ORAL at 09:04

## 2021-10-05 RX ADMIN — MEMANTINE 10 MG: 5 TABLET ORAL at 21:17

## 2021-10-05 RX ADMIN — RIVASTIGMINE 1 PATCH: 9.5 PATCH, EXTENDED RELEASE TRANSDERMAL at 09:05

## 2021-10-05 RX ADMIN — ASPIRIN 81 MG CHEWABLE TABLET 81 MG: 81 TABLET CHEWABLE at 09:05

## 2021-10-05 RX ADMIN — METOPROLOL TARTRATE 25 MG: 25 TABLET, FILM COATED ORAL at 09:05

## 2021-10-05 RX ADMIN — DOCUSATE SODIUM 100 MG: 100 CAPSULE, LIQUID FILLED ORAL at 21:18

## 2021-10-05 RX ADMIN — MEMANTINE 10 MG: 5 TABLET ORAL at 09:05

## 2021-10-05 RX ADMIN — ATORVASTATIN CALCIUM 80 MG: 80 TABLET, FILM COATED ORAL at 21:17

## 2021-10-05 RX ADMIN — METOPROLOL TARTRATE 25 MG: 25 TABLET, FILM COATED ORAL at 21:17

## 2021-10-05 RX ADMIN — CLOZAPINE 300 MG: 100 TABLET ORAL at 21:18

## 2021-10-05 RX ADMIN — POLYETHYLENE GLYCOL 3350 17 G: 17 POWDER, FOR SOLUTION ORAL at 09:04

## 2021-10-05 ASSESSMENT — ACTIVITIES OF DAILY LIVING (ADL)
LAUNDRY: UNABLE TO COMPLETE
ORAL_HYGIENE: PROMPTS
HYGIENE/GROOMING: PROMPTS
HYGIENE/GROOMING: PROMPTS
DRESS: SCRUBS (BEHAVIORAL HEALTH)
LAUNDRY: UNABLE TO COMPLETE
DRESS: SCRUBS (BEHAVIORAL HEALTH)
ORAL_HYGIENE: PROMPTS

## 2021-10-05 NOTE — PLAN OF CARE
"Problem: Psychotic Symptoms  Goal: Psychotic Symptoms  Description: Signs and symptoms of listed problems will be absent or manageable.  Outcome: No Change     John is oriented to self only. He thinks he is at [facility] and that it is June 19th. When reoriented, pt gets upset and yells, \"No! It's June 19th!\" Pt self regulates after this. Pt endorses hearing voices, per baseline. States, they're \"okay.\" He occasionally responds to internal stimuli and laughs to himself. He has a blunted affect. He took two naps tonight.    Pt remains behaviorally in control. He is overall calm and pleasant with staff. Refused to wear briefs again tonight. Pt takes HS meds, is compliant with cheeking precautions, and then goes to bed.   "

## 2021-10-05 NOTE — PLAN OF CARE
Problem: Behavioral Health Plan of Care  Goal: Plan of Care Review  Recent Flowsheet Documentation  Taken 10/5/2021 1000 by Lynn Singh RN  Plan of Care Reviewed With: patient  Patient Agreement with Plan of Care: agrees       Patient slept in this morning and did not eat breakfast.  Patient awakes to voice for medication administration and to obtain vital signs.  Patient work for [employer].  Reported a good appetite.  Patient denies thoughts of SI, SIB, HI, and hallucinations.  Denies depression and anxiety.  Patient ate lunch in the lounge and remained in the lounge after lunch. Patients mood is calm.  Affect if flat and blunted.  If you given too many directions, the patient can be irritable.  Patient rambles with speech.  Is able to answer yes or no to questions regarding safety.  Continue with plan of care.

## 2021-10-05 NOTE — PLAN OF CARE
Assessment/Intervention/Current Symtoms and Care Coordination  -Chart review  -Pre round meeting with team  -Rounded with team, addressed patient needs/concerns  -Post round meeting with team  Current Symptoms include the following: Psychosis, disorganization and confusion.     Discharge Plan or Goal  Pending stabilization & development of a safe discharge plan.  Considerations include: locked behavorial unit in nursing home     Barriers to Discharge  The pt does not have a guardian and a guardian has not been found for this pt. Nursing homes are requiring a guardian.     Referral Status  locked behavioral unit in nursing home  no new outside referrals were made today      Legal Status  Patient is under MI commitment in [facility]

## 2021-10-05 NOTE — PLAN OF CARE
Night Shift Summary    10/4/2021 - 10/5/2021      John was asleep at beginning of shift.  Monitored q15 minutes for safety.  Appeared to sleep throughout night with difficulty.    Slept 7 hrs.

## 2021-10-05 NOTE — PLAN OF CARE
"Problem: Psychotic Symptoms  Goal: Psychotic Symptoms  Description: Signs and symptoms of listed problems will be absent or manageable.  Outcome: No Change     John remains behaviorally in control. His mood is calm and he has not had any outbursts. He responds to internal stimuli. He is withdrawn and does not interact with peers or staff.  He reports his \"voices won't get out of my head. Fucking shit they're in my head.\" Pt has auditory hallucinations at baseline; however, they appear to be more bothersome to him lately.     Pt has the same oral TD sx: involuntary chewing motion and athetoid lingual movements noted during cheeking precautions. These are baseline for him and he maintains they are not bothersome. Pt accepts HS meds and complies with cheeking precautions.    Pt refused to wear briefs again. Offered extra snacks if he wears them, pt states, \"I don't need a fucking diaper.\" Pt now asleep in his room.      "

## 2021-10-06 LAB — SARS-COV-2 RNA RESP QL NAA+PROBE: NEGATIVE

## 2021-10-06 PROCEDURE — U0005 INFEC AGEN DETEC AMPLI PROBE: HCPCS | Performed by: PSYCHIATRY & NEUROLOGY

## 2021-10-06 PROCEDURE — 250N000013 HC RX MED GY IP 250 OP 250 PS 637: Performed by: PHYSICIAN ASSISTANT

## 2021-10-06 PROCEDURE — 250N000013 HC RX MED GY IP 250 OP 250 PS 637: Performed by: PSYCHIATRY & NEUROLOGY

## 2021-10-06 PROCEDURE — 124N000002 HC R&B MH UMMC

## 2021-10-06 PROCEDURE — 99232 SBSQ HOSP IP/OBS MODERATE 35: CPT | Performed by: PSYCHIATRY & NEUROLOGY

## 2021-10-06 RX ADMIN — METOPROLOL TARTRATE 25 MG: 25 TABLET, FILM COATED ORAL at 08:28

## 2021-10-06 RX ADMIN — RIVASTIGMINE 1 PATCH: 9.5 PATCH, EXTENDED RELEASE TRANSDERMAL at 08:26

## 2021-10-06 RX ADMIN — CLOZAPINE 300 MG: 100 TABLET ORAL at 20:37

## 2021-10-06 RX ADMIN — METOPROLOL TARTRATE 25 MG: 25 TABLET, FILM COATED ORAL at 20:37

## 2021-10-06 RX ADMIN — GUAIFENESIN 600 MG: 600 TABLET, EXTENDED RELEASE ORAL at 08:27

## 2021-10-06 RX ADMIN — SALMETEROL XINAFOATE 1 PUFF: 50 POWDER, METERED ORAL; RESPIRATORY (INHALATION) at 08:28

## 2021-10-06 RX ADMIN — BENZTROPINE MESYLATE 1 MG: 1 TABLET ORAL at 20:37

## 2021-10-06 RX ADMIN — SALMETEROL XINAFOATE 1 PUFF: 50 POWDER, METERED ORAL; RESPIRATORY (INHALATION) at 20:38

## 2021-10-06 RX ADMIN — ASPIRIN 81 MG CHEWABLE TABLET 81 MG: 81 TABLET CHEWABLE at 08:27

## 2021-10-06 RX ADMIN — Medication 50 MCG: at 08:28

## 2021-10-06 RX ADMIN — POLYETHYLENE GLYCOL 3350 17 G: 17 POWDER, FOR SOLUTION ORAL at 08:27

## 2021-10-06 RX ADMIN — MEMANTINE 10 MG: 5 TABLET ORAL at 20:36

## 2021-10-06 RX ADMIN — DOCUSATE SODIUM 100 MG: 100 CAPSULE, LIQUID FILLED ORAL at 08:27

## 2021-10-06 RX ADMIN — DOCUSATE SODIUM 100 MG: 100 CAPSULE, LIQUID FILLED ORAL at 20:37

## 2021-10-06 RX ADMIN — GUAIFENESIN 600 MG: 600 TABLET, EXTENDED RELEASE ORAL at 20:37

## 2021-10-06 RX ADMIN — CLOZAPINE 50 MG: 50 TABLET ORAL at 08:27

## 2021-10-06 RX ADMIN — ATORVASTATIN CALCIUM 80 MG: 80 TABLET, FILM COATED ORAL at 20:37

## 2021-10-06 RX ADMIN — MEMANTINE 10 MG: 5 TABLET ORAL at 08:27

## 2021-10-06 RX ADMIN — BENZTROPINE MESYLATE 1 MG: 1 TABLET ORAL at 08:27

## 2021-10-06 RX ADMIN — LEVOTHYROXINE SODIUM 88 MCG: 88 TABLET ORAL at 08:28

## 2021-10-06 ASSESSMENT — ACTIVITIES OF DAILY LIVING (ADL)
DRESS: SCRUBS (BEHAVIORAL HEALTH);PROMPTS
HYGIENE/GROOMING: INDEPENDENT;PROMPTS
ORAL_HYGIENE: INDEPENDENT;PROMPTS
DRESS: SCRUBS (BEHAVIORAL HEALTH)
LAUNDRY: UNABLE TO COMPLETE
ORAL_HYGIENE: INDEPENDENT;PROMPTS
HYGIENE/GROOMING: INDEPENDENT;PROMPTS
LAUNDRY: UNABLE TO COMPLETE

## 2021-10-06 NOTE — PLAN OF CARE
BEHAVIORAL TEAM DISCUSSION    Participants:   Dr. Dobbins, Melissa DAILEYSW, Brenda Mckeon OT, Tess Vaca RN  Nursing students  Pharmacy Resident      Progress:   This is day 98 at this hospital.  Pt is civilly committed.  Pt continues to respond to internal stimuli.  Pt has been more resistant to wearing depends.  Pt does not go to groups but rather sits kristy in the lounge.      Barriers to NH placement: lack of guardianship    Anticipated length of stay:   3 months    Continued Stay Criteria/Rationale:     Medical/Physical:   incontinence      Precautions:   Behavioral Orders   Procedures     Assault precautions     Cheeking Precautions (behavioral units)     Patient Observed swallowing PO medications; Patient asked to drink water after swallowing medication; Patient in Staff line of sight for 15 minutes after medication given; Mouth checks after PO administration (patient asked to open mouth and stick out their tongue).     Code 1     Elopement precautions     Routine Programming     As clinically indicated     Single Room     Status 15     Every 15 minutes.     Plan:   Medication Management  Waiting for news of guardianship and then will look for nursing home  Do not give regular underwear so pt will wear Depends    Rationale for change in precautions or plan: no changes

## 2021-10-06 NOTE — PLAN OF CARE
Assessment/Intervention/Current Symtoms and Care Coordination  -Chart review  -Pre round meeting with team  -Rounded with team, addressed patient needs/concerns  -Post round meeting with team  Current Symptoms include the following: Psychosis, disorganization and confusion.     Discharge Plan or Goal  Pending stabilization & development of a safe discharge plan.  Considerations include: locked behavorial unit in nursing home     Barriers to Discharge  The pt does not have a guardian and a guardian has not been found for this pt. Nursing homes are requiring a guardian.     Referral Status  locked behavioral unit in nursing home  no new outside referrals were made today      Legal Status  Patient is under MI commitment in [facility]

## 2021-10-06 NOTE — PROGRESS NOTES
"[facility], [place]   Psychiatric Progress Note  Hospital Day: 98        Interim History:   The patient's care was discussed with the treatment team during the daily team meeting and/or staff's chart notes were reviewed.  Staff report patient continues to be resistant to wearing incontinence briefs, taking medications as prescribed, continues to respond to internal stimuli, no acute events overnight.     Upon interview pt was lying in bed awake, reports he is tired, mood is \"fine\", denies SI. Denies HI. AH are \"still there\", denies they are getting worse. Denies VH. He asked when his commitment is \"up\" and writer shared he is still under commitment and that team is working on finding placement, he continues to believe he is [facility]. When discussing team is working on placement he stated \"I don't need a facility, this is bullshit\". Tolerating medications, continues to deny noticing TD movements. No additional concerns.          Medications:       aspirin  81 mg Oral Daily     atorvastatin  80 mg Oral At Bedtime     benztropine  1 mg Oral BID     cholecalciferol  1,250 mcg Oral Q7 Days     cloZAPine  300 mg Oral At Bedtime     cloZAPine  50 mg Oral Daily     docusate sodium  100 mg Oral BID     guaiFENesin  600 mg Oral BID     levothyroxine  88 mcg Oral Daily     memantine  10 mg Oral BID     metoprolol tartrate  25 mg Oral BID     polyethylene glycol  17 g Oral Daily     rivastigmine  1 patch Transdermal Daily     rivastigmine   Transdermal Q8H     salmeterol  1 puff Inhalation BID     vitamin D3  50 mcg Oral Daily          Allergies:     Allergies   Allergen Reactions     Ibuprofen      Latex           Labs:     No results found for this or any previous visit (from the past 48 hour(s)).       Psychiatric Examination:     /60   Pulse 99   Temp 97  F (36.1  C) (Tympanic)   Resp 16   Ht 1.829 m (6')   Wt 89.3 kg (196 lb 14.4 oz)   SpO2 97%   BMI 26.70 kg/m    Weight is 196 lbs " "14.4 oz  Body mass index is 26.7 kg/m .    Orthostatic Vitals       Most Recent      Sitting Orthostatic /87 10/05 1727    Sitting Orthostatic Pulse (bpm) 92 10/05 1727        Appearance: awake, alert and untidy  Attitude: somewhat cooperative   Eye Contact:  poor, often looking away from writer  Mood:  \"fine\" continues to have periods of irritability   Affect:  mood congruent and intensity is flat  Speech:  soft volume, raspy ,dysarthric   Language: fluent and intact in English  Psychomotor, Gait, Musculoskeletal:  no evidence of dystonia, or tics, continues to have movements of buccolingual dyskinesia present during interview  Thought Process: disorganized  Associations:  no loose associations  Thought Content:  denies SI and HI; continues to be delusional and observed responding to internal stimuli during interview, endorses AH, denies VH however appears to be experiencing VH  Insight:  limited  Judgement:  limited  Oriented to:  person, continues to believe he is a [facility]  Attention Span and Concentration:  limited  Recent and Remote Memory:  limited  Fund of Knowledge:  delayed    Clinical Global Impressions  First:  Considering your total clinical experience with this particular patient population, how severe are the patient's symptoms at this time?: 7 (07/01/21 0630)  Compared to the patient's condition at the START of treatment, this patient's condition is: 4 (07/01/21 0630)  Most recent:  Considering your total clinical experience with this particular patient population, how severe are the patient's symptoms at this time?: 4 (10/04/21 1307)  Compared to the patient's condition at the START of treatment, this patient's condition is: 2 (10/04/21 1307)         Precautions:     Behavioral Orders   Procedures     Assault precautions     Cheeking Precautions (behavioral units)     Patient Observed swallowing PO medications; Patient asked to drink water after swallowing medication; Patient in Staff line of " sight for 15 minutes after medication given; Mouth checks after PO administration (patient asked to open mouth and stick out their tongue).     Code 1     Elopement precautions     Routine Programming     As clinically indicated     Single Room     Status 15     Every 15 minutes.          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   Stimulant use disorder, in remission.   Transaminitis, possibly medication induced   Hx of CVA  Vit D deficiency  Hypertension  COPD  Hx of infective endocarditis with severe mitral and aortic regurgitation s/p biprostehtic valve replacement 2015  Hx of HFrEF now recovered  Tobacco use disorder  Non-severe malnutrition   Urinary incontinence         Assessment & Plan:   Assessment and hospital summary:  The patient is a 56yo male with a history of schizophrenia and TBI and multiple medical co-morbidities as above who was admitted after being transferred from [facility] medical [facility] after a nearly year-long stay. Is currently under commitment with [facility] recently amended to include Clozaril. While at [facility] was noted to have ongoing agitation and frequently attempting to elope from unit requiring 1:1 staffing for safety. He was started on 1:1 staff upon transfer, this was discontinued as patient has been more cooperative without elopement attempts. IM consult completed on admission and continued to follow due to elevated LFTs. Haldol and VPA discontinued due to LFTs. Cross titration completed from risperidone to clozaril after Yanez amended. EPSE/TD movements noted, have appeared stable over past few weeks. Patient has continued to have disorganization and active psychosis symptoms which appear to be at patients baseline. Team continues to work on disposition which barriers include patient has no guardian  or next of kin willing to act as surrogate decision maker, see CTC notes for complete details. Patient starting to have increasing symptoms noted week of 9/20, clozapine level ordered and noted to be lower than previous, have added cheeking precautions and increased clozapine dose on 9/24 and moved to split dosing given increasing afternoon agitation.    Psychiatric treatment/inteventions:  Medications:   -continue Clozaril 50mg in AM and 300mg at bedtime, started trial of split dosing 9/27 to see if patient better tolerates and targets afternoon agitation better to reduce PRN use; recent level was lower than previous at 389 on 9/20, continue to monitor movements consistent with TD  -continue cheeking precautions, started 9/27 given noted decompensation in recent weeks and lower clozapine level   -continue benztropine 1mg BID for EPSE  -continue memantine 5mg daily for neurocognitive disorder  -continue risperdal 1mg Q6H PRN agitation   -continue lorazepam 0.5-1.0mg Q4H PRN anxiety or severe agitation  -continue rivastigmine patch started 8/2 to target neurocognitive disorder, increased by Dr Tamayo on 10/1 to 9.5mg    Laboratory/Imaging: weekly WBC and ANC for clozapine monitoring continues ANC 10/1 was 1.9, clozapine level 9/20 was 389; will order repeat clozapine level with CBC this week on 10/8; weekly covid ordered     Patient will be treated in therapeutic milieu with appropriate individual and group therapies as described.     Medical treatment/interventions:  Medical concerns:   1) IM consult placed on admission, per consult note on 7/1/21:   Diagnoses:    #Major neurocognitive disorder dt hx of TBI and alcohol use disorder  #Schizophrenia  #Under commitment  Had been residing in group home prior to admission.  Brought to hospital for evaluation of aggressive behaviors. Group home now unwilling to take him back. Has had numerous CODE 21s called this stay. Seen by psych, meds adjusted. Is currently under  civil commitment and court hold through [facility] until 7/31/21. Please see recent discharge summary for details on 6/30/20201.   -Management per psychiatry      #Vitamin D Deficiency- Vit D level 16. Has been in hospital since Sept 2020. Started on cholecalciferol 39757 units on 6/17. Continue high-dose weekly replacement for 6 weeks total. Following high dose replacement recommend starting vitamin D 2000 international unit(s) daily replacement (start date 8/5/2021).      #Possible Tardive Dyskinesia vs EPSE- Per psych assessment on 4/1/21, noted to have possible tardive dyskinesia vs extrapyramidal side effects of meds.  - At that time, recommended to increase Cogentin to 1mg BID and add vitamin E 800 international unit(s) daily.      #Hx of CVA - hx of basal ganglia stroke in 2015. No focal neuro deficits aside from cognitive dysfunction as above.      #Hyperlipidemia - Continue PTA ASA 81 mg daily and atorvastatin 80 mg daily.      #COPD - Not O2 dependent. Continue PTA on salmeterol and albuterol PRN. Patient occasionally refusing inhalers, encourage compliance. Mucinex added for intermittent cough at [facility].      #Hypothyroidism- TSH 3.18 on 9/2020. Repeat recently on 5/29/2021 slightly elevated 5.18, with no T4 . Continue PTA levothyroxine 88 mcg daily. Repeat TSH with reflex to T4.      #Hx of infective endocarditis with severe mitral and aortic regurgitation S/P bioprosthetic valve replacement (10/2015)  #Hx of HFrEF, now recovered, not in acute exacerbation  Follows with Dr. Dockery of Heart and Vascular Cardiology, last seen in 1/2020. Echocardiogram in 5/2016 with EF 50%, no evidence for prosthetic mitral and aortic valve dysfunction.   - Follow-up with outpatient Cardiology upon dismissal from the hospital (on every 2 year follow-ups).  - Continue ASA 81 mg daily      #Tobacco use disorder- Using nicotine patch and PRN gum.     #Non-severe malnutrition- Nutrition consulted and following at OSH.       Ordered CMP, CBC, and TSH with reflex.  No further recommendations at this time. Please page the on-call SHREE for any intercurrent medical issues which arise.      ADDENDUM: TSH normal. CBC normal. ALT elevated at 174, with unsatisfactory AST. Per chart review, LFTs last checked in December with  and . T bili and alk phos.  Reviewed with pharmacy, and possible medications causing would be Haldol, depakote, and statin. Discussed with psych who will taper haldol and Depakote. Holding statin, will check LDL.  Will repeat LFTs in AM, and check CK. Medicine will follow up on repeat LFTs, and CK.      Edith Alfaro PA-C  Hospitalist Service     2) Per updated progress note by IM 7/4:   A/P:  #Transaminitis - slowly rising LFTs since December. Asymptomatic. Reviewed with pharmacy, and possible medications causing would be Haldol, depakote, and statin. Discussed with psych who will taper haldol and Depakote. Holding statin, (total cholesterol 91, with LDL 41). Repeat LFTs continue to rise slightly.   -Abdominal US tomorrow (NPO after midnight)  -Hepatitis B surface ab and agn and Hep C ab   -Repeat LFTs in 3 days     Medicine will follow up on Abdominal US, viral hepatitis studies and repeat LFTs.  No further recommendations at this time. Please page the on-call SHREE for any intercurrent medical issues which arise.      Edith Alfaro PA-C  Hospitalist Service     Per progress note 7/12:      Brief Medicine Note     Medicine following peripherally for LFT monitoring.  LFTs today improved:  (182) and ALT 92 (129). T bili and ALP WNL.      Today's vital signs, medications, and nursing notes were reviewed.       /77 (BP Location: Left arm)   Pulse 110   Temp 97.8  F (36.6  C) (Oral)   Resp 16   Ht 1.829 m (6')   Wt 87.5 kg (192 lb 14.4 oz)   SpO2 95%   BMI 26.16 kg/m       Continue current plan of care. Will continue to trend CMP In 3 days to ensure continued downtrend.      Kim  "MADHAVI Harman  Hospitalist Service        3) Urinary incontinence and stool incontinence: RN staff first reported urinary incontinence over weekend 7/9, per chart review patient has history of intermittent urinary incontinence going back to at least 2016, continues to have intermittent episodes of incontinence, continues to have both urinary and stool incontinence, placed IM consult for further assessment of need for additional work up given pts limitations as historian, appreciate assistance, no abnormalities noted on evaluation including bladder scan. Patient declining to wear incontinence briefs most days, staff continuing to encourage and started behavioral plan 9/10 to provide patient with pizza 1x per week for cooperation with incontinence cares. Pt did not wear briefs evening of 9/14, more agreeable 9/15.     4) Unvaccinated for Covid-19, see interim history of note 8/19 by writer, attempted to discuss R/B/A of vaccination with patient, he did not demonstrate capacity, has no next of kin acting as surrogate decision maker, is awaiting guardianship, given patients age and medical co-morbidities the risks of receiving Covid vaccine are outweighed by the benefits, including patient is required to be vaccinated to be considered for discharge to some nursing home facilities which would be a more appropriate milieu and less restrictive environment for patient than locked inpatient psychiatric unit. Dr. Nickerson provided second opinion regarding administration of Covid vaccine on 8.20 and agrees with recommendation.   -Covid vaccine order placed 8/23, J&J vaccine given 8/24    5) On 9/17 Pt reported pain in \"heart and arms\", discussed with IM who evaluated patient given significant cardiac history, EKG completed and patient reported to IM he  was no longer having any pain/discomfort, see IM progress note 9/17 for complete details, agree with assessment and plan as outlined by IM    This note was created by " undersigned using a Dragon dictation system. All typing errors or contextual distortion are unintentional and software inherent.     Disposition Plan   Reason for ongoing admission: is unable to care for self due to severe psychosis or mariusz and neurocognitive disorder   Discharge location: D, likely locked NH facility , unable to progress on discharge planning due to guardianship not being in place, see CTC notes  Discharge Medications: not ordered  Follow-up Appointments: not scheduled  Legal Status: Full MI commitment and Yanez     Entered by: Jeanette Dobbins on 10/6/2021 at 7:56 AM

## 2021-10-06 NOTE — PLAN OF CARE
Problem: Behavioral Health Plan of Care  Goal: Develops/Participates in [facility] [place] to Support Successful Transition  Intervention: Foster [facility] [place]  Recent Flowsheet Documentation  Taken 10/6/2021 1000 by Tess Vaca RN  Trust Relationship/Rapport:   care explained   reassurance provided   thoughts/feelings acknowledged   Patient remains in his room the majority of the day not attending groups.  Out for meals, took am meds went back to his room.  Poor eye contact, mood labile. Refused a shower today and also refused pull -up's. He is hard to understand at times, mumbles.  Sitting in lounge alone talking to self.

## 2021-10-06 NOTE — PLAN OF CARE
Night Shift Summary (10/5/21 into 10/06/21)    Pt in bed sleeping at start of shift. Breathing quiet and unlabored. Appears to have slept 6.5 hours.    Assault, Elopement, and Cheeking precautions in addition to Single Room order; any related events noted above.     Will continue to monitor and assess.       Problem: Behavioral Health Plan of Care  Goal: Absence of New-Onset Illness or Injury  Outcome: Improving     Problem: Sleep Disturbance  Goal: Adequate Sleep/Rest  Outcome: Improving

## 2021-10-06 NOTE — PLAN OF CARE
Patient presents with a blunted affect. Mood presents as calm. Is cooperative. Speech is mumbled. Eye contact is poor. Alert to self only. Patient denies depression, anxiety, suicidal ideation, SIB, and homicidal ideation. Patient denies auditory/visual hallucinations, yet appears to be responding to internal stimuli; as evidenced by, staring into space and lips moving as if talking to imaginary person or persons. No medical issues noted. Vital signs stable. Medication compliant. No medication cheeking noted. Ate evening meal. Patient out in the milieu occasionally watching movies. No interaction with staff or peers noted.     /87   Pulse 89   Temp 97.6  F (36.4  C) (Oral)   Resp 16   Ht 1.829 m (6')   Wt 89.3 kg (196 lb 14.4 oz)   SpO2 97%   BMI 26.70 kg/m

## 2021-10-07 PROCEDURE — 250N000013 HC RX MED GY IP 250 OP 250 PS 637: Performed by: PSYCHIATRY & NEUROLOGY

## 2021-10-07 PROCEDURE — 250N000013 HC RX MED GY IP 250 OP 250 PS 637: Performed by: PHYSICIAN ASSISTANT

## 2021-10-07 PROCEDURE — 99232 SBSQ HOSP IP/OBS MODERATE 35: CPT | Performed by: PSYCHIATRY & NEUROLOGY

## 2021-10-07 PROCEDURE — 124N000002 HC R&B MH UMMC

## 2021-10-07 RX ADMIN — SALMETEROL XINAFOATE 1 PUFF: 50 POWDER, METERED ORAL; RESPIRATORY (INHALATION) at 09:12

## 2021-10-07 RX ADMIN — GUAIFENESIN 600 MG: 600 TABLET, EXTENDED RELEASE ORAL at 09:09

## 2021-10-07 RX ADMIN — ASPIRIN 81 MG CHEWABLE TABLET 81 MG: 81 TABLET CHEWABLE at 09:09

## 2021-10-07 RX ADMIN — CLOZAPINE 50 MG: 50 TABLET ORAL at 09:09

## 2021-10-07 RX ADMIN — LEVOTHYROXINE SODIUM 88 MCG: 88 TABLET ORAL at 09:09

## 2021-10-07 RX ADMIN — GUAIFENESIN 600 MG: 600 TABLET, EXTENDED RELEASE ORAL at 20:48

## 2021-10-07 RX ADMIN — CLOZAPINE 300 MG: 100 TABLET ORAL at 20:48

## 2021-10-07 RX ADMIN — MEMANTINE 10 MG: 5 TABLET ORAL at 20:48

## 2021-10-07 RX ADMIN — Medication 50 MCG: at 09:09

## 2021-10-07 RX ADMIN — CHOLECALCIFEROL CAP 1.25 MG (50000 UNIT) 1250 MCG: 1.25 CAP at 09:11

## 2021-10-07 RX ADMIN — BENZTROPINE MESYLATE 1 MG: 1 TABLET ORAL at 09:08

## 2021-10-07 RX ADMIN — DOCUSATE SODIUM 100 MG: 100 CAPSULE, LIQUID FILLED ORAL at 09:08

## 2021-10-07 RX ADMIN — RIVASTIGMINE 1 PATCH: 9.5 PATCH, EXTENDED RELEASE TRANSDERMAL at 09:09

## 2021-10-07 RX ADMIN — MEMANTINE 10 MG: 5 TABLET ORAL at 09:09

## 2021-10-07 RX ADMIN — BENZTROPINE MESYLATE 1 MG: 1 TABLET ORAL at 20:48

## 2021-10-07 RX ADMIN — POLYETHYLENE GLYCOL 3350 17 G: 17 POWDER, FOR SOLUTION ORAL at 09:09

## 2021-10-07 RX ADMIN — SALMETEROL XINAFOATE 1 PUFF: 50 POWDER, METERED ORAL; RESPIRATORY (INHALATION) at 20:48

## 2021-10-07 RX ADMIN — METOPROLOL TARTRATE 25 MG: 25 TABLET, FILM COATED ORAL at 09:08

## 2021-10-07 RX ADMIN — ATORVASTATIN CALCIUM 80 MG: 80 TABLET, FILM COATED ORAL at 20:48

## 2021-10-07 RX ADMIN — METOPROLOL TARTRATE 25 MG: 25 TABLET, FILM COATED ORAL at 20:48

## 2021-10-07 RX ADMIN — DOCUSATE SODIUM 100 MG: 100 CAPSULE, LIQUID FILLED ORAL at 20:48

## 2021-10-07 ASSESSMENT — ACTIVITIES OF DAILY LIVING (ADL)
HYGIENE/GROOMING: PROMPTS
HYGIENE/GROOMING: PROMPTS
ORAL_HYGIENE: PROMPTS
DRESS: SCRUBS (BEHAVIORAL HEALTH);PROMPTS
LAUNDRY: UNABLE TO COMPLETE
DRESS: SCRUBS (BEHAVIORAL HEALTH)
ORAL_HYGIENE: PROMPTS
LAUNDRY: UNABLE TO COMPLETE

## 2021-10-07 NOTE — PLAN OF CARE
Night Shift Summary (10/6/21 into 10/07/21)    Pt in bed sleeping at start of shift. Breathing quiet and unlabored. Appears to have slept 5 hours.     Assault, Elopement, and Cheeking precautions in addition to Single Room order; any related events noted above.      Will continue to monitor and assess.       Problem: Behavioral Health Plan of Care  Goal: Absence of New-Onset Illness or Injury  Outcome: Improving     Problem: Sleep Disturbance  Goal: Adequate Sleep/Rest  Outcome: Declining

## 2021-10-07 NOTE — PROGRESS NOTES
"[facility], [place]   Psychiatric Progress Note  Hospital Day: 99        Interim History:   The patient's care was discussed with the treatment team during the daily team meeting and/or staff's chart notes were reviewed.  Staff report patient has been visible in milieu but keeps to self, observed responding to internal stimuli, taking medications as prescribed, declined to shower yesterday, no acute events overnight.     Upon interview pt reports mood is \"alright\", continues to have AH \"people that come into [his] head\", denies VH. Does not feel AH are getting worse. Denies SI or HI. Tolerating medications, continues to deny noticing TD side effect. Encouraged to take a shower and he stated \"later\", no additional concerns.          Medications:       aspirin  81 mg Oral Daily     atorvastatin  80 mg Oral At Bedtime     benztropine  1 mg Oral BID     cholecalciferol  1,250 mcg Oral Q7 Days     cloZAPine  300 mg Oral At Bedtime     cloZAPine  50 mg Oral Daily     docusate sodium  100 mg Oral BID     guaiFENesin  600 mg Oral BID     levothyroxine  88 mcg Oral Daily     memantine  10 mg Oral BID     metoprolol tartrate  25 mg Oral BID     polyethylene glycol  17 g Oral Daily     rivastigmine  1 patch Transdermal Daily     rivastigmine   Transdermal Q8H     salmeterol  1 puff Inhalation BID     vitamin D3  50 mcg Oral Daily          Allergies:     Allergies   Allergen Reactions     Ibuprofen      Latex           Labs:     Recent Results (from the past 48 hour(s))   Asymptomatic COVID-19 Virus (Coronavirus) by PCR Nasopharyngeal    Collection Time: 10/06/21 11:44 AM    Specimen: Nasopharyngeal; Swab   Result Value Ref Range    SARS CoV2 PCR Negative Negative          Psychiatric Examination:     /72 (BP Location: Right arm)   Pulse 91   Temp 97.6  F (36.4  C) (Oral)   Resp 16   Ht 1.829 m (6')   Wt 89.3 kg (196 lb 14.4 oz)   SpO2 97%   BMI 26.70 kg/m    Weight is 196 lbs 14.4 oz  " "Body mass index is 26.7 kg/m .    Orthostatic Vitals       Most Recent      Sitting Orthostatic /73 10/06 0904    Sitting Orthostatic Pulse (bpm) 86 10/06 0904        Appearance: awake, alert and untidy  Attitude: somewhat cooperative   Eye Contact:  poor, often looking away from writer  Mood:  \"alright\" continues to have periods of irritability   Affect:  mood congruent and intensity is flat  Speech:  soft volume, raspy ,dysarthric   Language: fluent and intact in English  Psychomotor, Gait, Musculoskeletal:  no evidence of dystonia, or tics, continues to have movements of buccolingual dyskinesia present during interview  Thought Process: disorganized  Associations:  no loose associations  Thought Content:  denies SI and HI; continues to be delusional and observed responding to internal stimuli during interview, endorses AH, denies VH however appears to be experiencing VH  Insight:  limited  Judgement:  limited  Oriented to:  person  Attention Span and Concentration:  limited  Recent and Remote Memory:  limited  Fund of Knowledge:  delayed    Clinical Global Impressions  First:  Considering your total clinical experience with this particular patient population, how severe are the patient's symptoms at this time?: 7 (07/01/21 0630)  Compared to the patient's condition at the START of treatment, this patient's condition is: 4 (07/01/21 0630)  Most recent:  Considering your total clinical experience with this particular patient population, how severe are the patient's symptoms at this time?: 4 (10/04/21 1307)  Compared to the patient's condition at the START of treatment, this patient's condition is: 2 (10/04/21 1307)         Precautions:     Behavioral Orders   Procedures     Assault precautions     Cheeking Precautions (behavioral units)     Patient Observed swallowing PO medications; Patient asked to drink water after swallowing medication; Patient in Staff line of sight for 15 minutes after medication given; " Mouth checks after PO administration (patient asked to open mouth and stick out their tongue).     Code 1     Elopement precautions     Routine Programming     As clinically indicated     Single Room     Status 15     Every 15 minutes.          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   Stimulant use disorder, in remission.   Transaminitis, possibly medication induced   Hx of CVA  Vit D deficiency  Hypertension  COPD  Hx of infective endocarditis with severe mitral and aortic regurgitation s/p biprostehtic valve replacement 2015  Hx of HFrEF now recovered  Tobacco use disorder  Non-severe malnutrition   Urinary incontinence         Assessment & Plan:   Assessment and hospital summary:  The patient is a 54yo male with a history of schizophrenia and TBI and multiple medical co-morbidities as above who was admitted after being transferred from [facility] medical [facility] after a nearly year-long stay. Is currently under commitment with Y[facility]z recently amended to include Clozaril. While at [facility] was noted to have ongoing agitation and frequently attempting to elope from unit requiring 1:1 staffing for safety. He was started on 1:1 staff upon transfer, this was discontinued as patient has been more cooperative without elopement attempts. IM consult completed on admission and continued to follow due to elevated LFTs. Haldol and VPA discontinued due to LFTs. Cross titration completed from risperidone to clozaril after Yanez amended. EPSE/TD movements noted, have appeared stable over past few weeks. Patient has continued to have disorganization and active psychosis symptoms which appear to be at patients baseline. Team continues to work on disposition which barriers include patient has no guardian or next of kin willing to act as surrogate  decision maker, see CTC notes for complete details. Patient starting to have increasing symptoms noted week of 9/20, clozapine level ordered and noted to be lower than previous, have added cheeking precautions and increased clozapine dose on 9/24 and moved to split dosing given increasing afternoon agitation.    Psychiatric treatment/inteventions:  Medications:   -continue Clozaril 50mg in AM and 300mg at bedtime, started trial of split dosing 9/27 to see if patient better tolerates and targets afternoon agitation better to reduce PRN use; recent level was lower than previous at 389 on 9/20, continue to monitor movements consistent with TD  -continue cheeking precautions, started 9/27 given noted decompensation in recent weeks and lower clozapine level   -continue benztropine 1mg BID for EPSE  -continue memantine 5mg daily for neurocognitive disorder  -continue risperdal 1mg Q6H PRN agitation   -continue lorazepam 0.5-1.0mg Q4H PRN anxiety or severe agitation  -continue rivastigmine patch started 8/2 to target neurocognitive disorder, increased by Dr Tamayo on 10/1 to 9.5mg    Laboratory/Imaging: weekly WBC and ANC for clozapine monitoring continues ANC 10/1 was 1.9, clozapine level 9/20 was 389; will order repeat clozapine level with CBC this week on 10/8; weekly covid negative 10/6     Patient will be treated in therapeutic milieu with appropriate individual and group therapies as described.     Medical treatment/interventions:  Medical concerns:   1) IM consult placed on admission, per consult note on 7/1/21:   Diagnoses:    #Major neurocognitive disorder dt hx of TBI and alcohol use disorder  #Schizophrenia  #Under commitment  Had been residing in group home prior to admission.  Brought to hospital for evaluation of aggressive behaviors. Group home now unwilling to take him back. Has had numerous CODE 21s called this stay. Seen by psych, meds adjusted. Is currently under civil commitment and court hold through  [facility] until 7/31/21. Please see recent discharge summary for details on 6/30/20201.   -Management per psychiatry      #Vitamin D Deficiency- Vit D level 16. Has been in hospital since Sept 2020. Started on cholecalciferol 91521 units on 6/17. Continue high-dose weekly replacement for 6 weeks total. Following high dose replacement recommend starting vitamin D 2000 international unit(s) daily replacement (start date 8/5/2021).      #Possible Tardive Dyskinesia vs EPSE- Per psych assessment on 4/1/21, noted to have possible tardive dyskinesia vs extrapyramidal side effects of meds.  - At that time, recommended to increase Cogentin to 1mg BID and add vitamin E 800 international unit(s) daily.      #Hx of CVA - hx of basal ganglia stroke in 2015. No focal neuro deficits aside from cognitive dysfunction as above.      #Hyperlipidemia - Continue PTA ASA 81 mg daily and atorvastatin 80 mg daily.      #COPD - Not O2 dependent. Continue PTA on salmeterol and albuterol PRN. Patient occasionally refusing inhalers, encourage compliance. Mucinex added for intermittent cough at [facility].      #Hypothyroidism- TSH 3.18 on 9/2020. Repeat recently on 5/29/2021 slightly elevated 5.18, with no T4 . Continue PTA levothyroxine 88 mcg daily. Repeat TSH with reflex to T4.      #Hx of infective endocarditis with severe mitral and aortic regurgitation S/P bioprosthetic valve replacement (10/2015)  #Hx of HFrEF, now recovered, not in acute exacerbation  Follows with Dr. Dockery of [facility], last seen in 1/2020. Echocardiogram in 5/2016 with EF 50%, no evidence for prosthetic mitral and aortic valve dysfunction.   - Follow-up with outpatient Cardiology upon dismissal from the hospital (on every 2 year follow-ups).  - Continue ASA 81 mg daily      #Tobacco use disorder- Using nicotine patch and PRN gum.     #Non-severe malnutrition- Nutrition consulted and following at OSH.      Ordered CMP, CBC, and TSH with  reflex.  No further recommendations at this time. Please page the on-call SHREE for any intercurrent medical issues which arise.      ADDENDUM: TSH normal. CBC normal. ALT elevated at 174, with unsatisfactory AST. Per chart review, LFTs last checked in December with  and . T bili and alk phos.  Reviewed with pharmacy, and possible medications causing would be Haldol, depakote, and statin. Discussed with psych who will taper haldol and Depakote. Holding statin, will check LDL.  Will repeat LFTs in AM, and check CK. Medicine will follow up on repeat LFTs, and CK.      Edtih Alfaro PA-C  Hospitalist Service     2) Per updated progress note by IM 7/4:   A/P:  #Transaminitis - slowly rising LFTs since December. Asymptomatic. Reviewed with pharmacy, and possible medications causing would be Haldol, depakote, and statin. Discussed with psych who will taper haldol and Depakote. Holding statin, (total cholesterol 91, with LDL 41). Repeat LFTs continue to rise slightly.   -Abdominal US tomorrow (NPO after midnight)  -Hepatitis B surface ab and agn and Hep C ab   -Repeat LFTs in 3 days     Medicine will follow up on Abdominal US, viral hepatitis studies and repeat LFTs.  No further recommendations at this time. Please page the on-call SHREE for any intercurrent medical issues which arise.      Edith Alfaro PA-C  Hospitalist Service     Per progress note 7/12:      Brief Medicine Note     Medicine following peripherally for LFT monitoring.  LFTs today improved:  (182) and ALT 92 (129). T bili and ALP WNL.      Today's vital signs, medications, and nursing notes were reviewed.       /77 (BP Location: Left arm)   Pulse 110   Temp 97.8  F (36.6  C) (Oral)   Resp 16   Ht 1.829 m (6')   Wt 87.5 kg (192 lb 14.4 oz)   SpO2 95%   BMI 26.16 kg/m       Continue current plan of care. Will continue to trend CMP In 3 days to ensure continued downtrend.      Kim Harman PA-C  Hospitalist  "Service        3) Urinary incontinence and stool incontinence: RN staff first reported urinary incontinence over weekend 7/9, per chart review patient has history of intermittent urinary incontinence going back to at least 2016, continues to have intermittent episodes of incontinence, continues to have both urinary and stool incontinence, placed IM consult for further assessment of need for additional work up given pts limitations as historian, appreciate assistance, no abnormalities noted on evaluation including bladder scan. Patient declining to wear incontinence briefs most days, staff continuing to encourage and started behavioral plan 9/10 to provide patient with pizza 1x per week for cooperation with incontinence cares. Pt did not wear briefs evening of 9/14, more agreeable 9/15.     4) Unvaccinated for Covid-19, see interim history of note 8/19 by writer, attempted to discuss R/B/A of vaccination with patient, he did not demonstrate capacity, has no next of kin acting as surrogate decision maker, is awaiting guardianship, given patients age and medical co-morbidities the risks of receiving Covid vaccine are outweighed by the benefits, including patient is required to be vaccinated to be considered for discharge to some nursing home facilities which would be a more appropriate milieu and less restrictive environment for patient than locked inpatient psychiatric unit. Dr. Nickerson provided second opinion regarding administration of Covid vaccine on 8.20 and agrees with recommendation.   -Covid vaccine order placed 8/23, J&J vaccine given 8/24    5) On 9/17 Pt reported pain in \"heart and arms\", discussed with IM who evaluated patient given significant cardiac history, EKG completed and patient reported to IM he  was no longer having any pain/discomfort, see IM progress note 9/17 for complete details, agree with assessment and plan as outlined by IM    This note was created by undersigned using a Dragon dictation " system. All typing errors or contextual distortion are unintentional and software inherent.     Disposition Plan   Reason for ongoing admission: is unable to care for self due to severe psychosis or mariusz and neurocognitive disorder   Discharge location: TBD, likely locked NH facility , unable to progress on discharge planning due to guardianship not being in place, see CTC notes  Discharge Medications: not ordered  Follow-up Appointments: not scheduled  Legal Status: Full MI commitment and Yanez     Entered by: Jeanette Dobbins on 10/7/2021 at 7:27 AM

## 2021-10-07 NOTE — PLAN OF CARE
Problem: Behavioral Health Plan of Care  Goal: Plan of Care Review  Recent Flowsheet Documentation  Taken 10/7/2021 1000 by Lynn Singh RN  Plan of Care Reviewed With: patient  Patient Agreement with Plan of Care: unable to participate    Patient is withdrawn to his room this morning.  Refused breakfast.  Compliant with medications.  Patient stated, he was tired and wanting to sleep in this morning.  Writer checked in with the patient.  Upon approach to the patients room, the patient is lying in bed with his eyes open.  Patient has a flat and blunted affect.  Mood is calm.  Patient ate all of lunch.  Denies any questions or concerns.  Patient returned to his room after lunch.  Continue with plan of care.

## 2021-10-07 NOTE — PLAN OF CARE
Assessment/Intervention/Current Symtoms and Care Coordination  -Chart review  -Pre round meeting with team  -Rounded with team, addressed patient needs/concerns  -Post round meeting with team  Current Symptoms include the following: Psychosis, disorganization and confusion.     I had a conversation with the TCM today to review some of hte past options and why they didn't work.          Discharge Plan or Goal  Pending stabilization & development of a safe discharge plan.  Considerations include: locked behavorial unit in nursing home     Barriers to Discharge  The pt does not have a guardian and a guardian has not been found for this pt. Nursing homes are requiring a guardian.     Referral Status  locked behavioral unit in nursing home  no new outside referrals were made today      Legal Status  Patient is under MI commitment in [facility]

## 2021-10-07 NOTE — PLAN OF CARE
Patient presents with a blunted affect. Mood presents as calm. Is cooperative. Speech is mumbled. Eye contact is poor. Alert x2, self and place. Patient denies depression, anxiety, suicidal ideation, SIB, and homicidal ideation. Patient denies auditory/visual hallucinations, yet appears to be responding to internal stimuli; as evidenced by, staring into space and lips moving as if talking to imaginary person or persons. No medical issues noted. Vital signs stable. Medication compliant. No medication cheeking noted. Ate evening meal. Patient spent the majority of this shift in his room and in bed. No interaction with staff or peers noted.     /79 (BP Location: Right arm)   Pulse 98   Temp 97.9  F (36.6  C) (Tympanic)   Resp 16   Ht 1.829 m (6')   Wt 89.3 kg (196 lb 14.4 oz)   SpO2 95%   BMI 26.70 kg/m

## 2021-10-08 LAB
BASOPHILS # BLD AUTO: 0.1 10E3/UL (ref 0–0.2)
BASOPHILS NFR BLD AUTO: 2 %
EOSINOPHIL # BLD AUTO: 1 10E3/UL (ref 0–0.7)
EOSINOPHIL NFR BLD AUTO: 21 %
ERYTHROCYTE [DISTWIDTH] IN BLOOD BY AUTOMATED COUNT: 12.3 % (ref 10–15)
HCT VFR BLD AUTO: 44.2 % (ref 40–53)
HGB BLD-MCNC: 15.2 G/DL (ref 13.3–17.7)
IMM GRANULOCYTES # BLD: 0 10E3/UL
IMM GRANULOCYTES NFR BLD: 0 %
LYMPHOCYTES # BLD AUTO: 1.3 10E3/UL (ref 0.8–5.3)
LYMPHOCYTES NFR BLD AUTO: 26 %
MCH RBC QN AUTO: 31.5 PG (ref 26.5–33)
MCHC RBC AUTO-ENTMCNC: 34.4 G/DL (ref 31.5–36.5)
MCV RBC AUTO: 92 FL (ref 78–100)
MONOCYTES # BLD AUTO: 0.4 10E3/UL (ref 0–1.3)
MONOCYTES NFR BLD AUTO: 8 %
NEUTROPHILS # BLD AUTO: 2.1 10E3/UL (ref 1.6–8.3)
NEUTROPHILS NFR BLD AUTO: 43 %
NRBC # BLD AUTO: 0 10E3/UL
NRBC BLD AUTO-RTO: 0 /100
PLATELET # BLD AUTO: 232 10E3/UL (ref 150–450)
RBC # BLD AUTO: 4.82 10E6/UL (ref 4.4–5.9)
WBC # BLD AUTO: 4.8 10E3/UL (ref 4–11)

## 2021-10-08 PROCEDURE — 80159 DRUG ASSAY CLOZAPINE: CPT | Performed by: PSYCHIATRY & NEUROLOGY

## 2021-10-08 PROCEDURE — 250N000013 HC RX MED GY IP 250 OP 250 PS 637: Performed by: PSYCHIATRY & NEUROLOGY

## 2021-10-08 PROCEDURE — 124N000002 HC R&B MH UMMC

## 2021-10-08 PROCEDURE — 85025 COMPLETE CBC W/AUTO DIFF WBC: CPT | Performed by: PSYCHIATRY & NEUROLOGY

## 2021-10-08 PROCEDURE — 36415 COLL VENOUS BLD VENIPUNCTURE: CPT | Performed by: PSYCHIATRY & NEUROLOGY

## 2021-10-08 PROCEDURE — 99232 SBSQ HOSP IP/OBS MODERATE 35: CPT | Performed by: PSYCHIATRY & NEUROLOGY

## 2021-10-08 PROCEDURE — 250N000013 HC RX MED GY IP 250 OP 250 PS 637: Performed by: PHYSICIAN ASSISTANT

## 2021-10-08 RX ADMIN — BENZTROPINE MESYLATE 1 MG: 1 TABLET ORAL at 20:12

## 2021-10-08 RX ADMIN — ATORVASTATIN CALCIUM 80 MG: 80 TABLET, FILM COATED ORAL at 20:12

## 2021-10-08 RX ADMIN — RIVASTIGMINE 1 PATCH: 9.5 PATCH, EXTENDED RELEASE TRANSDERMAL at 08:40

## 2021-10-08 RX ADMIN — DOCUSATE SODIUM 100 MG: 100 CAPSULE, LIQUID FILLED ORAL at 08:39

## 2021-10-08 RX ADMIN — POLYETHYLENE GLYCOL 3350 17 G: 17 POWDER, FOR SOLUTION ORAL at 08:39

## 2021-10-08 RX ADMIN — BENZTROPINE MESYLATE 1 MG: 1 TABLET ORAL at 08:39

## 2021-10-08 RX ADMIN — DOCUSATE SODIUM 100 MG: 100 CAPSULE, LIQUID FILLED ORAL at 20:12

## 2021-10-08 RX ADMIN — METOPROLOL TARTRATE 25 MG: 25 TABLET, FILM COATED ORAL at 20:12

## 2021-10-08 RX ADMIN — MEMANTINE 10 MG: 5 TABLET ORAL at 08:39

## 2021-10-08 RX ADMIN — Medication 50 MCG: at 08:38

## 2021-10-08 RX ADMIN — CLOZAPINE 50 MG: 50 TABLET ORAL at 08:38

## 2021-10-08 RX ADMIN — GUAIFENESIN 600 MG: 600 TABLET, EXTENDED RELEASE ORAL at 08:39

## 2021-10-08 RX ADMIN — GUAIFENESIN 600 MG: 600 TABLET, EXTENDED RELEASE ORAL at 20:12

## 2021-10-08 RX ADMIN — LEVOTHYROXINE SODIUM 88 MCG: 88 TABLET ORAL at 08:38

## 2021-10-08 RX ADMIN — ASPIRIN 81 MG CHEWABLE TABLET 81 MG: 81 TABLET CHEWABLE at 08:38

## 2021-10-08 RX ADMIN — MEMANTINE 10 MG: 5 TABLET ORAL at 20:12

## 2021-10-08 RX ADMIN — SALMETEROL XINAFOATE 1 PUFF: 50 POWDER, METERED ORAL; RESPIRATORY (INHALATION) at 20:14

## 2021-10-08 RX ADMIN — METOPROLOL TARTRATE 25 MG: 25 TABLET, FILM COATED ORAL at 08:39

## 2021-10-08 RX ADMIN — CLOZAPINE 300 MG: 100 TABLET ORAL at 20:12

## 2021-10-08 RX ADMIN — SALMETEROL XINAFOATE 1 PUFF: 50 POWDER, METERED ORAL; RESPIRATORY (INHALATION) at 08:38

## 2021-10-08 ASSESSMENT — ACTIVITIES OF DAILY LIVING (ADL)
LAUNDRY: WITH SUPERVISION
DRESS: PROMPTS
ORAL_HYGIENE: PROMPTS
ORAL_HYGIENE: PROMPTS
DRESS: WITH ASSISTANCE
HYGIENE/GROOMING: PROMPTS
HYGIENE/GROOMING: PROMPTS
LAUNDRY: UNABLE TO COMPLETE

## 2021-10-08 NOTE — PROGRESS NOTES
"[facility] [place]   Psychiatric Progress Note  Hospital Day: 100        Interim History:   The patient's care was discussed with the treatment team during the daily team meeting and/or staff's chart notes were reviewed.  Staff report patient was more isolative to room yesterday, reporting being tired, taking medications as prescribed, denying MH symptoms including AVH but continues to appear responding, no acute events overnight.     Upon interview reports mood is \"fine\", denies SI or HI, denies VH, reports having voices that continue to be bothersome but are not getting worse. Encouraged to take a shower, reports he will \"later\" and get some clean clothes from staff. Not noticing the TD movements. Denies having any physical health concerns. No additional concerns.          Medications:       aspirin  81 mg Oral Daily     atorvastatin  80 mg Oral At Bedtime     benztropine  1 mg Oral BID     cholecalciferol  1,250 mcg Oral Q7 Days     cloZAPine  300 mg Oral At Bedtime     cloZAPine  50 mg Oral Daily     docusate sodium  100 mg Oral BID     guaiFENesin  600 mg Oral BID     levothyroxine  88 mcg Oral Daily     memantine  10 mg Oral BID     metoprolol tartrate  25 mg Oral BID     polyethylene glycol  17 g Oral Daily     rivastigmine  1 patch Transdermal Daily     rivastigmine   Transdermal Q8H     salmeterol  1 puff Inhalation BID     vitamin D3  50 mcg Oral Daily          Allergies:     Allergies   Allergen Reactions     Ibuprofen      Latex           Labs:     Recent Results (from the past 48 hour(s))   Asymptomatic COVID-19 Virus (Coronavirus) by PCR Nasopharyngeal    Collection Time: 10/06/21 11:44 AM    Specimen: Nasopharyngeal; Swab   Result Value Ref Range    SARS CoV2 PCR Negative Negative          Psychiatric Examination:     /76 (BP Location: Right arm)   Pulse 86   Temp 97.9  F (36.6  C) (Tympanic)   Resp 16   Ht 1.829 m (6')   Wt 89.3 kg (196 lb 14.4 oz)   SpO2 95% " "  BMI 26.70 kg/m    Weight is 196 lbs 14.4 oz  Body mass index is 26.7 kg/m .    Orthostatic Vitals     None        Appearance: awake, alert and untidy  Attitude: somewhat cooperative   Eye Contact:  poor, often looking away from writer  Mood:  \"fine\" continues to have periods of irritability   Affect:  mood congruent and intensity is flat  Speech:  soft volume, raspy ,dysarthric   Language: fluent and intact in English  Psychomotor, Gait, Musculoskeletal:  no evidence of dystonia, or tics, continues to have movements of buccolingual dyskinesia present during interview  Thought Process: disorganized  Associations:  no loose associations  Thought Content:  denies SI and HI; continues to be delusional and observed responding to internal stimuli during interview, endorses AH, denies VH however appears to be experiencing VH  Insight:  limited  Judgement:  limited  Oriented to:  person  Attention Span and Concentration:  limited  Recent and Remote Memory:  limited  Fund of Knowledge:  delayed    Clinical Global Impressions  First:  Considering your total clinical experience with this particular patient population, how severe are the patient's symptoms at this time?: 7 (07/01/21 0630)  Compared to the patient's condition at the START of treatment, this patient's condition is: 4 (07/01/21 0630)  Most recent:  Considering your total clinical experience with this particular patient population, how severe are the patient's symptoms at this time?: 4 (10/04/21 1307)  Compared to the patient's condition at the START of treatment, this patient's condition is: 2 (10/04/21 1307)         Precautions:     Behavioral Orders   Procedures     Assault precautions     Cheeking Precautions (behavioral units)     Patient Observed swallowing PO medications; Patient asked to drink water after swallowing medication; Patient in Staff line of sight for 15 minutes after medication given; Mouth checks after PO administration (patient asked to " open mouth and stick out their tongue).     Code 1     Elopement precautions     Routine Programming     As clinically indicated     Single Room     Status 15     Every 15 minutes.          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   Stimulant use disorder, in remission.   Transaminitis, possibly medication induced   Hx of CVA  Vit D deficiency  Hypertension  COPD  Hx of infective endocarditis with severe mitral and aortic regurgitation s/p biprostehtic valve replacement 2015  Hx of HFrEF now recovered  Tobacco use disorder  Non-severe malnutrition   Urinary incontinence         Assessment & Plan:   Assessment and hospital summary:  The patient is a 56yo male with a history of schizophrenia and TBI and multiple medical co-morbidities as above who was admitted after being transferred from [facility] after a nearly year-long stay. Is currently under commitment with [facility] recently amended to include Clozaril. While at [facility] was noted to have ongoing agitation and frequently attempting to elope from unit requiring 1:1 staffing for safety. He was started on 1:1 staff upon transfer, this was discontinued as patient has been more cooperative without elopement attempts. IM consult completed on admission and continued to follow due to elevated LFTs. Haldol and VPA discontinued due to LFTs. Cross titration completed from risperidone to clozaril after Yanez amended. EPSE/TD movements noted, have appeared stable over past few weeks. Patient has continued to have disorganization and active psychosis symptoms which appear to be at patients baseline. Team continues to work on disposition which barriers include patient has no guardian or next of kin willing to act as surrogate decision maker, see CTC notes for complete details.  Patient starting to have increasing symptoms noted week of 9/20, clozapine level ordered and noted to be lower than previous, have added cheeking precautions and increased clozapine dose on 9/24 and moved to split dosing given increasing afternoon agitation.    Psychiatric treatment/inteventions:  Medications:   -continue Clozaril 50mg in AM and 300mg at bedtime, started trial of split dosing 9/27 to see if patient better tolerates and targets afternoon agitation better to reduce PRN use; recent level was lower than previous at 389 on 9/20, continue to monitor movements consistent with TD  -continue cheeking precautions, started 9/27 given noted decompensation in recent weeks and lower clozapine level   -continue benztropine 1mg BID for EPSE  -continue memantine 5mg daily for neurocognitive disorder  -continue risperdal 1mg Q6H PRN agitation   -continue lorazepam 0.5-1.0mg Q4H PRN anxiety or severe agitation  -continue rivastigmine patch started 8/2 to target neurocognitive disorder, increased by Dr Tamayo on 10/1 to 9.5mg    Laboratory/Imaging: weekly WBC and ANC for clozapine monitoring continues ANC 10/1 was 1.9, clozapine level 9/20 was 389; will order repeat clozapine level with CBC this week on 10/8; weekly covid negative 10/6     Patient will be treated in therapeutic milieu with appropriate individual and group therapies as described.     Medical treatment/interventions:  Medical concerns:   1) IM consult placed on admission, per consult note on 7/1/21:   Diagnoses:    #Major neurocognitive disorder dt hx of TBI and alcohol use disorder  #Schizophrenia  #Under commitment  Had been residing in group home prior to admission.  Brought to hospital for evaluation of aggressive behaviors. Group home now unwilling to take him back. Has had numerous CODE 21s called this stay. Seen by psych, meds adjusted. Is currently under civil commitment and court hold through [facility] until 7/31/21. Please see recent  discharge summary for details on 6/30/20201.   -Management per psychiatry      #Vitamin D Deficiency- Vit D level 16. Has been in hospital since Sept 2020. Started on cholecalciferol 10873 units on 6/17. Continue high-dose weekly replacement for 6 weeks total. Following high dose replacement recommend starting vitamin D 2000 international unit(s) daily replacement (start date 8/5/2021).      #Possible Tardive Dyskinesia vs EPSE- Per psych assessment on 4/1/21, noted to have possible tardive dyskinesia vs extrapyramidal side effects of meds.  - At that time, recommended to increase Cogentin to 1mg BID and add vitamin E 800 international unit(s) daily.      #Hx of CVA - hx of basal ganglia stroke in 2015. No focal neuro deficits aside from cognitive dysfunction as above.      #Hyperlipidemia - Continue PTA ASA 81 mg daily and atorvastatin 80 mg daily.      #COPD - Not O2 dependent. Continue PTA on salmeterol and albuterol PRN. Patient occasionally refusing inhalers, encourage compliance. Mucinex added for intermittent cough at [facility].      #Hypothyroidism- TSH 3.18 on 9/2020. Repeat recently on 5/29/2021 slightly elevated 5.18, with no T4 . Continue PTA levothyroxine 88 mcg daily. Repeat TSH with reflex to T4.      #Hx of infective endocarditis with severe mitral and aortic regurgitation S/P bioprosthetic valve replacement (10/2015)  #Hx of HFrEF, now recovered, not in acute exacerbation  Follows with Dr. Dockery of [facility], last seen in 1/2020. Echocardiogram in 5/2016 with EF 50%, no evidence for prosthetic mitral and aortic valve dysfunction.   - Follow-up with outpatient Cardiology upon dismissal from the hospital (on every 2 year follow-ups).  - Continue ASA 81 mg daily      #Tobacco use disorder- Using nicotine patch and PRN gum.     #Non-severe malnutrition- Nutrition consulted and following at OSH.      Ordered CMP, CBC, and TSH with reflex.  No further recommendations at this  time. Please page the on-call SHREE for any intercurrent medical issues which arise.      ADDENDUM: TSH normal. CBC normal. ALT elevated at 174, with unsatisfactory AST. Per chart review, LFTs last checked in December with  and . T bili and alk phos.  Reviewed with pharmacy, and possible medications causing would be Haldol, depakote, and statin. Discussed with psych who will taper haldol and Depakote. Holding statin, will check LDL.  Will repeat LFTs in AM, and check CK. Medicine will follow up on repeat LFTs, and CK.      Edith Alfaro PA-C  Hospitalist Service     2) Per updated progress note by IM 7/4:   A/P:  #Transaminitis - slowly rising LFTs since December. Asymptomatic. Reviewed with pharmacy, and possible medications causing would be Haldol, depakote, and statin. Discussed with psych who will taper haldol and Depakote. Holding statin, (total cholesterol 91, with LDL 41). Repeat LFTs continue to rise slightly.   -Abdominal US tomorrow (NPO after midnight)  -Hepatitis B surface ab and agn and Hep C ab   -Repeat LFTs in 3 days     Medicine will follow up on Abdominal US, viral hepatitis studies and repeat LFTs.  No further recommendations at this time. Please page the on-call SHREE for any intercurrent medical issues which arise.      Edith Alfaro PA-C  Hospitalist Service     Per progress note 7/12:      Brief Medicine Note     Medicine following peripherally for LFT monitoring.  LFTs today improved:  (182) and ALT 92 (129). T bili and ALP WNL.      Today's vital signs, medications, and nursing notes were reviewed.       /77 (BP Location: Left arm)   Pulse 110   Temp 97.8  F (36.6  C) (Oral)   Resp 16   Ht 1.829 m (6')   Wt 87.5 kg (192 lb 14.4 oz)   SpO2 95%   BMI 26.16 kg/m       Continue current plan of care. Will continue to trend CMP In 3 days to ensure continued downtrend.      Kim Harman PA-C  Hospitalist Service        3) Urinary incontinence and stool  "incontinence: RN staff first reported urinary incontinence over weekend 7/9, per chart review patient has history of intermittent urinary incontinence going back to at least 2016, continues to have intermittent episodes of incontinence, continues to have both urinary and stool incontinence, placed IM consult for further assessment of need for additional work up given pts limitations as historian, appreciate assistance, no abnormalities noted on evaluation including bladder scan. Patient declining to wear incontinence briefs most days, staff continuing to encourage and started behavioral plan 9/10 to provide patient with pizza 1x per week for cooperation with incontinence cares. Pt did not wear briefs evening of 9/14, more agreeable 9/15.     4) Unvaccinated for Covid-19, see interim history of note 8/19 by writer, attempted to discuss R/B/A of vaccination with patient, he did not demonstrate capacity, has no next of kin acting as surrogate decision maker, is awaiting guardianship, given patients age and medical co-morbidities the risks of receiving Covid vaccine are outweighed by the benefits, including patient is required to be vaccinated to be considered for discharge to some nursing home facilities which would be a more appropriate milieu and less restrictive environment for patient than locked inpatient psychiatric unit. Dr. Nickerson provided second opinion regarding administration of Covid vaccine on 8.20 and agrees with recommendation.   -Covid vaccine order placed 8/23, J&J vaccine given 8/24    5) On 9/17 Pt reported pain in \"heart and arms\", discussed with IM who evaluated patient given significant cardiac history, EKG completed and patient reported to IM he  was no longer having any pain/discomfort, see IM progress note 9/17 for complete details, agree with assessment and plan as outlined by IM    This note was created by undersigned using a Dragon dictation system. All typing errors or contextual " distortion are unintentional and software inherent.     Disposition Plan   Reason for ongoing admission: is unable to care for self due to severe psychosis or mariusz and neurocognitive disorder   Discharge location: TBD, likely locked NH facility , unable to progress on discharge planning due to guardianship not being in place, see CTC notes  Discharge Medications: not ordered  Follow-up Appointments: not scheduled  Legal Status: Full MI commitment and [facility]     Entered by: Jeanette Dobbins on 10/8/2021 at 7:35 AM

## 2021-10-08 NOTE — PLAN OF CARE
Night Shift Summary (10/7/21 into 10/08/21)     Pt in bed sleeping at start of shift. Breathing quiet and unlabored. Appears to have slept 7 hours.     Assault, Elopement, and Cheeking precautions in addition to Single Room order; any related events noted above.      Will continue to monitor and assess.       Problem: Behavioral Health Plan of Care  Goal: Absence of New-Onset Illness or Injury  Outcome: Improving     Problem: Sleep Disturbance  Goal: Adequate Sleep/Rest  Outcome: Improving

## 2021-10-08 NOTE — PLAN OF CARE
Assessment/Intervention/Current Symtoms and Care Coordination  -Chart review  -Pre round meeting with team  -Rounded with team, addressed patient needs/concerns  -Post round meeting with team  Current Symptoms include the following: Psychosis, disorganization and confusion.     I had a conversation with the TCM today to review some of hte past options and why they didn't work.              Discharge Plan or Goal  Pending stabilization & development of a safe discharge plan.  Considerations include: locked behavorial unit in nursing home     Barriers to Discharge  The pt does not have a guardian and a guardian has not been found for this pt. Nursing homes are requiring a guardian.     Referral Status  locked behavioral unit in nursing home  no new outside referrals were made today      Legal Status  Patient is under MI commitment in [facility]

## 2021-10-08 NOTE — PLAN OF CARE
Patient presents with a blunted affect. Mood presents as calm. Is cooperative. Speech is mumbled. Eye contact is fair to poor. Alert x1, self only. Patient denies depression, anxiety, suicidal ideation, SIB, and homicidal ideation. Patient denies auditory/visual hallucinations, yet appears to be responding to internal stimuli; as evidenced by, staring into space and lips moving as if talking to imaginary person or persons. No medical issues noted. Vital signs stable. Medication compliant. No medication cheeking noted. Ate evening meal. Patient was out in the milieu occasionally this shift. No interaction with staff or peers noted.     /89 (BP Location: Left arm)   Pulse 99   Temp 97.2  F (36.2  C) (Tympanic)   Resp 16   Ht 1.829 m (6')   Wt 89.3 kg (196 lb 14.4 oz)   SpO2 96%   BMI 26.70 kg/m

## 2021-10-08 NOTE — PLAN OF CARE
Problem: Cognitive Impairment (Psychotic Signs/Symptoms)  Goal: Optimal Cognitive Function (Psychotic Signs/Symptoms)  Intervention: Support and Promote Cognitive Ability  Recent Flowsheet Documentation  Taken 10/8/2021 1022 by Tess Vaca RN  Trust Relationship/Rapport:   care explained   emotional support provided   reassurance provided   thoughts/feelings acknowledged   Patient continues to have hard time following simple direction.  When asked to put his head back so this staff could do Covid swab he lifted his arm.  Needed to manually lift his chin for him.  Did not come out for breakfast today, this staff went to his room to give his am medications.  Asked if he was wet which he denied, and tried to encourage a shower but he declined. , will continue to encourage help with his ADL'S. .

## 2021-10-09 PROCEDURE — 124N000002 HC R&B MH UMMC

## 2021-10-09 PROCEDURE — 250N000013 HC RX MED GY IP 250 OP 250 PS 637: Performed by: PSYCHIATRY & NEUROLOGY

## 2021-10-09 PROCEDURE — 250N000013 HC RX MED GY IP 250 OP 250 PS 637: Performed by: PHYSICIAN ASSISTANT

## 2021-10-09 RX ADMIN — RIVASTIGMINE 1 PATCH: 9.5 PATCH, EXTENDED RELEASE TRANSDERMAL at 08:55

## 2021-10-09 RX ADMIN — Medication 50 MCG: at 08:55

## 2021-10-09 RX ADMIN — ASPIRIN 81 MG CHEWABLE TABLET 81 MG: 81 TABLET CHEWABLE at 08:55

## 2021-10-09 RX ADMIN — GUAIFENESIN 600 MG: 600 TABLET, EXTENDED RELEASE ORAL at 08:55

## 2021-10-09 RX ADMIN — CLOZAPINE 50 MG: 50 TABLET ORAL at 08:55

## 2021-10-09 RX ADMIN — METOPROLOL TARTRATE 25 MG: 25 TABLET, FILM COATED ORAL at 08:55

## 2021-10-09 RX ADMIN — GUAIFENESIN 600 MG: 600 TABLET, EXTENDED RELEASE ORAL at 20:29

## 2021-10-09 RX ADMIN — MEMANTINE 10 MG: 5 TABLET ORAL at 20:29

## 2021-10-09 RX ADMIN — DOCUSATE SODIUM 100 MG: 100 CAPSULE, LIQUID FILLED ORAL at 20:29

## 2021-10-09 RX ADMIN — BENZTROPINE MESYLATE 1 MG: 1 TABLET ORAL at 20:29

## 2021-10-09 RX ADMIN — POLYETHYLENE GLYCOL 3350 17 G: 17 POWDER, FOR SOLUTION ORAL at 08:54

## 2021-10-09 RX ADMIN — BENZTROPINE MESYLATE 1 MG: 1 TABLET ORAL at 08:55

## 2021-10-09 RX ADMIN — MEMANTINE 10 MG: 5 TABLET ORAL at 08:55

## 2021-10-09 RX ADMIN — CLOZAPINE 300 MG: 100 TABLET ORAL at 20:33

## 2021-10-09 RX ADMIN — ATORVASTATIN CALCIUM 80 MG: 80 TABLET, FILM COATED ORAL at 20:29

## 2021-10-09 RX ADMIN — SALMETEROL XINAFOATE 1 PUFF: 50 POWDER, METERED ORAL; RESPIRATORY (INHALATION) at 20:29

## 2021-10-09 RX ADMIN — LEVOTHYROXINE SODIUM 88 MCG: 88 TABLET ORAL at 08:55

## 2021-10-09 RX ADMIN — DOCUSATE SODIUM 100 MG: 100 CAPSULE, LIQUID FILLED ORAL at 08:55

## 2021-10-09 RX ADMIN — METOPROLOL TARTRATE 25 MG: 25 TABLET, FILM COATED ORAL at 20:32

## 2021-10-09 RX ADMIN — SALMETEROL XINAFOATE 1 PUFF: 50 POWDER, METERED ORAL; RESPIRATORY (INHALATION) at 09:01

## 2021-10-09 ASSESSMENT — ACTIVITIES OF DAILY LIVING (ADL)
LAUNDRY: UNABLE TO COMPLETE
ORAL_HYGIENE: PROMPTS
DRESS: WITH ASSISTANCE
HYGIENE/GROOMING: PROMPTS

## 2021-10-09 NOTE — PLAN OF CARE
"Patient presents with a blunted affect. Mood presents as calm. Is cooperative. Speech is mumbled. Eye contact is fair to poor. Alert x2, self and place. Patient denies depression, anxiety, suicidal ideation, SIB, and homicidal ideation. Patient denies auditory/visual hallucinations, yet appears to be responding to internal stimuli; as evidenced by, staring into space and lips moving as if talking to imaginary person or persons. No medical issues noted. Vital signs stable. Medication compliant. No medication cheeking noted. Did not eat evening meal. Patient stated that he ws \"too tired\". Spent the majority of this shift in his room and in bed. No interaction with staff or peers noted.    /78   Pulse 99   Temp 97.4  F (36.3  C) (Oral)   Resp 16   Ht 1.829 m (6')   Wt 89.3 kg (196 lb 14.4 oz)   SpO2 96%   BMI 26.70 kg/m      "

## 2021-10-09 NOTE — PLAN OF CARE
"  Problem: Psychotic Symptoms  Goal: Psychotic Symptoms  Description: Signs and symptoms of listed problems will be absent or manageable.  Outcome: No Change       Pt was incontinent of urine this morning.  Linen changed and pt encouraged to shower.  Pt agreed to the shower went in and staff had the water ready and running for him.  Pt was in there for less than 2 minutes came out stating \"am too tired to shower\".  Pt has been in bed most of the shift.  Pt denies any pain or discomfort states \"Am really tired\".  Vitals within normal limits.  Encouraged pt to drink fluids.  Lunch tray was brought to the room and he ate 50%.  Pt observed talking to himself, using profanities directed to some female.  Will continue to assess.    "

## 2021-10-09 NOTE — PLAN OF CARE
Pt has appeared to sleep through the night. Respirations even and regular. No needs identified.     Sleep hours: 7

## 2021-10-10 PROCEDURE — 124N000002 HC R&B MH UMMC

## 2021-10-10 PROCEDURE — 250N000013 HC RX MED GY IP 250 OP 250 PS 637: Performed by: PSYCHIATRY & NEUROLOGY

## 2021-10-10 PROCEDURE — 250N000013 HC RX MED GY IP 250 OP 250 PS 637: Performed by: PHYSICIAN ASSISTANT

## 2021-10-10 RX ADMIN — POLYETHYLENE GLYCOL 3350 17 G: 17 POWDER, FOR SOLUTION ORAL at 08:32

## 2021-10-10 RX ADMIN — CLOZAPINE 50 MG: 50 TABLET ORAL at 08:33

## 2021-10-10 RX ADMIN — MEMANTINE 10 MG: 5 TABLET ORAL at 19:59

## 2021-10-10 RX ADMIN — MEMANTINE 10 MG: 5 TABLET ORAL at 08:34

## 2021-10-10 RX ADMIN — SALMETEROL XINAFOATE 1 PUFF: 50 POWDER, METERED ORAL; RESPIRATORY (INHALATION) at 19:59

## 2021-10-10 RX ADMIN — DOCUSATE SODIUM 100 MG: 100 CAPSULE, LIQUID FILLED ORAL at 19:59

## 2021-10-10 RX ADMIN — BENZTROPINE MESYLATE 1 MG: 1 TABLET ORAL at 08:34

## 2021-10-10 RX ADMIN — CLOZAPINE 300 MG: 100 TABLET ORAL at 19:58

## 2021-10-10 RX ADMIN — DOCUSATE SODIUM 100 MG: 100 CAPSULE, LIQUID FILLED ORAL at 08:33

## 2021-10-10 RX ADMIN — METOPROLOL TARTRATE 25 MG: 25 TABLET, FILM COATED ORAL at 19:59

## 2021-10-10 RX ADMIN — Medication 50 MCG: at 08:34

## 2021-10-10 RX ADMIN — METOPROLOL TARTRATE 25 MG: 25 TABLET, FILM COATED ORAL at 08:33

## 2021-10-10 RX ADMIN — LEVOTHYROXINE SODIUM 88 MCG: 88 TABLET ORAL at 08:33

## 2021-10-10 RX ADMIN — RIVASTIGMINE 1 PATCH: 9.5 PATCH, EXTENDED RELEASE TRANSDERMAL at 08:32

## 2021-10-10 RX ADMIN — ASPIRIN 81 MG CHEWABLE TABLET 81 MG: 81 TABLET CHEWABLE at 08:33

## 2021-10-10 RX ADMIN — ATORVASTATIN CALCIUM 80 MG: 80 TABLET, FILM COATED ORAL at 19:59

## 2021-10-10 RX ADMIN — SALMETEROL XINAFOATE 1 PUFF: 50 POWDER, METERED ORAL; RESPIRATORY (INHALATION) at 08:32

## 2021-10-10 RX ADMIN — GUAIFENESIN 600 MG: 600 TABLET, EXTENDED RELEASE ORAL at 19:58

## 2021-10-10 RX ADMIN — BENZTROPINE MESYLATE 1 MG: 1 TABLET ORAL at 19:59

## 2021-10-10 RX ADMIN — GUAIFENESIN 600 MG: 600 TABLET, EXTENDED RELEASE ORAL at 08:33

## 2021-10-10 ASSESSMENT — ACTIVITIES OF DAILY LIVING (ADL)
DRESS: INDEPENDENT
ORAL_HYGIENE: PROMPTS
LAUNDRY: WITH SUPERVISION
LAUNDRY: UNABLE TO COMPLETE
HYGIENE/GROOMING: INDEPENDENT
HYGIENE/GROOMING: PROMPTS
DRESS: WITH ASSISTANCE
ORAL_HYGIENE: INDEPENDENT

## 2021-10-10 NOTE — PLAN OF CARE
He continues to appears to be responding to internal stimulation.  Sits in lounge talking to self. When asked about why he is so tired he states all the voices are interfering with his sleep. This staff has been trying to get him into the shower today but he complains about feeling too tired. Blunted affect, mood appears frustrated. He is medication compliant with encouragement. Did not eat breakfast, but will encourage lunch.

## 2021-10-10 NOTE — PLAN OF CARE
Patient presents with a blunted affect. Mood presents as calm. Is cooperative. Speech is mumbled. Eye contact is fair to poor. Alert x1, self only. Patient denies depression, anxiety, suicidal ideation, SIB, and homicidal ideation. Patient denies auditory/visual hallucinations, yet appears to be responding to internal stimuli; as evidenced by, staring into space and lips moving as if talking to imaginary person or persons. No medical issues noted. Vital signs stable. Medication compliant. No medication cheeking noted. Ate evening meal. Patient out in the milieu briefly at start of this shift. Spent the rest of this shift in his room and in bed. No interaction with staff or peers noted. Did not shower this shift.    /72 (BP Location: Left arm)   Pulse 89   Temp 98.8  F (37.1  C) (Tympanic)   Resp 16   Ht 1.829 m (6')   Wt 89.3 kg (196 lb 14.4 oz)   SpO2 98%   BMI 26.70 kg/m

## 2021-10-10 NOTE — PLAN OF CARE
Night Shift Summary (10/9/21 into 10/10/21)    Pt in bed sleeping at start of shift. Breathing quiet and unlabored. Appears to have slept 7 hours.    Assault, Elopement and Cheeking precautions in addition to Single Room order; any related events noted above.     Will continue to monitor and assess.       Problem: Behavioral Health Plan of Care  Goal: Absence of New-Onset Illness or Injury  Outcome: Improving     Problem: Sleep Disturbance  Goal: Adequate Sleep/Rest  Outcome: Improving

## 2021-10-11 ENCOUNTER — APPOINTMENT (OUTPATIENT)
Dept: GENERAL RADIOLOGY | Facility: CLINIC | Age: 56
DRG: 885 | End: 2021-10-11
Attending: PHYSICIAN ASSISTANT
Payer: COMMERCIAL

## 2021-10-11 LAB
ALBUMIN SERPL-MCNC: 3.2 G/DL (ref 3.4–5)
ALP SERPL-CCNC: 89 U/L (ref 40–150)
ALT SERPL W P-5'-P-CCNC: 63 U/L (ref 0–70)
ANION GAP SERPL CALCULATED.3IONS-SCNC: 5 MMOL/L (ref 3–14)
AST SERPL W P-5'-P-CCNC: 106 U/L (ref 0–45)
BILIRUB SERPL-MCNC: 0.6 MG/DL (ref 0.2–1.3)
BUN SERPL-MCNC: 20 MG/DL (ref 7–30)
CALCIUM SERPL-MCNC: 8.6 MG/DL (ref 8.5–10.1)
CHLORIDE BLD-SCNC: 109 MMOL/L (ref 94–109)
CO2 SERPL-SCNC: 25 MMOL/L (ref 20–32)
CREAT SERPL-MCNC: 0.91 MG/DL (ref 0.66–1.25)
ERYTHROCYTE [DISTWIDTH] IN BLOOD BY AUTOMATED COUNT: 12.1 % (ref 10–15)
GFR SERPL CREATININE-BSD FRML MDRD: >90 ML/MIN/1.73M2
GLUCOSE BLD-MCNC: 87 MG/DL (ref 70–99)
HCT VFR BLD AUTO: 44.8 % (ref 40–53)
HGB BLD-MCNC: 15.9 G/DL (ref 13.3–17.7)
MCH RBC QN AUTO: 32.1 PG (ref 26.5–33)
MCHC RBC AUTO-ENTMCNC: 35.5 G/DL (ref 31.5–36.5)
MCV RBC AUTO: 91 FL (ref 78–100)
PLATELET # BLD AUTO: 219 10E3/UL (ref 150–450)
POTASSIUM BLD-SCNC: 3.7 MMOL/L (ref 3.4–5.3)
PROT SERPL-MCNC: 8.1 G/DL (ref 6.8–8.8)
RBC # BLD AUTO: 4.95 10E6/UL (ref 4.4–5.9)
SODIUM SERPL-SCNC: 139 MMOL/L (ref 133–144)
TSH SERPL DL<=0.005 MIU/L-ACNC: 2.24 MU/L (ref 0.4–4)
WBC # BLD AUTO: 4.9 10E3/UL (ref 4–11)

## 2021-10-11 PROCEDURE — 250N000013 HC RX MED GY IP 250 OP 250 PS 637: Performed by: PHYSICIAN ASSISTANT

## 2021-10-11 PROCEDURE — 85027 COMPLETE CBC AUTOMATED: CPT | Performed by: PHYSICIAN ASSISTANT

## 2021-10-11 PROCEDURE — 99232 SBSQ HOSP IP/OBS MODERATE 35: CPT | Performed by: PSYCHIATRY & NEUROLOGY

## 2021-10-11 PROCEDURE — 74019 RADEX ABDOMEN 2 VIEWS: CPT

## 2021-10-11 PROCEDURE — 250N000013 HC RX MED GY IP 250 OP 250 PS 637: Performed by: PSYCHIATRY & NEUROLOGY

## 2021-10-11 PROCEDURE — 36415 COLL VENOUS BLD VENIPUNCTURE: CPT | Performed by: PHYSICIAN ASSISTANT

## 2021-10-11 PROCEDURE — 84443 ASSAY THYROID STIM HORMONE: CPT | Performed by: PHYSICIAN ASSISTANT

## 2021-10-11 PROCEDURE — 124N000002 HC R&B MH UMMC

## 2021-10-11 PROCEDURE — 99232 SBSQ HOSP IP/OBS MODERATE 35: CPT | Performed by: PHYSICIAN ASSISTANT

## 2021-10-11 PROCEDURE — 74019 RADEX ABDOMEN 2 VIEWS: CPT | Mod: 26 | Performed by: RADIOLOGY

## 2021-10-11 PROCEDURE — 80053 COMPREHEN METABOLIC PANEL: CPT | Performed by: PHYSICIAN ASSISTANT

## 2021-10-11 RX ORDER — BISACODYL 10 MG
10 SUPPOSITORY, RECTAL RECTAL DAILY PRN
Status: DISCONTINUED | OUTPATIENT
Start: 2021-10-11 | End: 2022-01-18 | Stop reason: HOSPADM

## 2021-10-11 RX ORDER — POLYETHYLENE GLYCOL 3350 17 G/17G
17 POWDER, FOR SOLUTION ORAL 2 TIMES DAILY
Status: DISCONTINUED | OUTPATIENT
Start: 2021-10-11 | End: 2021-10-17

## 2021-10-11 RX ORDER — AMOXICILLIN 250 MG
2 CAPSULE ORAL 2 TIMES DAILY
Status: DISCONTINUED | OUTPATIENT
Start: 2021-10-11 | End: 2021-10-17

## 2021-10-11 RX ADMIN — METOPROLOL TARTRATE 25 MG: 25 TABLET, FILM COATED ORAL at 08:56

## 2021-10-11 RX ADMIN — CLOZAPINE 50 MG: 50 TABLET ORAL at 08:56

## 2021-10-11 RX ADMIN — RIVASTIGMINE 1 PATCH: 9.5 PATCH, EXTENDED RELEASE TRANSDERMAL at 08:56

## 2021-10-11 RX ADMIN — ASPIRIN 81 MG CHEWABLE TABLET 81 MG: 81 TABLET CHEWABLE at 08:56

## 2021-10-11 RX ADMIN — MEMANTINE 10 MG: 5 TABLET ORAL at 08:56

## 2021-10-11 RX ADMIN — ATORVASTATIN CALCIUM 80 MG: 80 TABLET, FILM COATED ORAL at 20:37

## 2021-10-11 RX ADMIN — GUAIFENESIN 600 MG: 600 TABLET, EXTENDED RELEASE ORAL at 08:55

## 2021-10-11 RX ADMIN — Medication 50 MCG: at 08:56

## 2021-10-11 RX ADMIN — SALMETEROL XINAFOATE 1 PUFF: 50 POWDER, METERED ORAL; RESPIRATORY (INHALATION) at 08:58

## 2021-10-11 RX ADMIN — BENZTROPINE MESYLATE 1 MG: 1 TABLET ORAL at 20:37

## 2021-10-11 RX ADMIN — POLYETHYLENE GLYCOL 3350 17 G: 17 POWDER, FOR SOLUTION ORAL at 08:56

## 2021-10-11 RX ADMIN — METOPROLOL TARTRATE 25 MG: 25 TABLET, FILM COATED ORAL at 20:37

## 2021-10-11 RX ADMIN — DOCUSATE SODIUM 100 MG: 100 CAPSULE, LIQUID FILLED ORAL at 08:56

## 2021-10-11 RX ADMIN — SENNOSIDES AND DOCUSATE SODIUM 2 TABLET: 8.6; 5 TABLET ORAL at 19:05

## 2021-10-11 RX ADMIN — MEMANTINE 10 MG: 5 TABLET ORAL at 20:37

## 2021-10-11 RX ADMIN — CLOZAPINE 300 MG: 100 TABLET ORAL at 20:37

## 2021-10-11 RX ADMIN — BENZTROPINE MESYLATE 1 MG: 1 TABLET ORAL at 08:56

## 2021-10-11 RX ADMIN — POLYETHYLENE GLYCOL 3350 17 G: 17 POWDER, FOR SOLUTION ORAL at 20:37

## 2021-10-11 RX ADMIN — GUAIFENESIN 600 MG: 600 TABLET, EXTENDED RELEASE ORAL at 20:37

## 2021-10-11 RX ADMIN — MAGNESIUM HYDROXIDE 30 ML: 400 SUSPENSION ORAL at 17:18

## 2021-10-11 RX ADMIN — LEVOTHYROXINE SODIUM 88 MCG: 88 TABLET ORAL at 08:56

## 2021-10-11 RX ADMIN — SALMETEROL XINAFOATE 1 PUFF: 50 POWDER, METERED ORAL; RESPIRATORY (INHALATION) at 20:37

## 2021-10-11 ASSESSMENT — ACTIVITIES OF DAILY LIVING (ADL)
LAUNDRY: UNABLE TO COMPLETE
HYGIENE/GROOMING: PROMPTS
DRESS: WITH ASSISTANCE

## 2021-10-11 NOTE — PROGRESS NOTES
"[facility], [place]   Psychiatric Progress Note  Hospital Day: 103        Interim History:   The patient's care was discussed with the treatment team during the daily team meeting and/or staff's chart notes were reviewed.  Staff report patient has been visible in milieu but keeps to self, continues to respond to internal stimuli, taking medications as prescribed, staff attempting to have patient shower, reporting \"too tired\", no acute events over weekend.     Upon interview pt reports mood is \"fine\", he is tired and states this is because of the people coming into his head. Continues to have AH, denies VH, denies SI or HI. His breakfast tray was in room, untouched, he has not been hungry, nauseated \"sometimes\" and made a comment about peanut butter making him feel ill, believes he had a BM \"a few days ago\". Denies abdominal pain, denies any discomfort outside of being tired. No additional concerns.          Medications:       aspirin  81 mg Oral Daily     atorvastatin  80 mg Oral At Bedtime     benztropine  1 mg Oral BID     cholecalciferol  1,250 mcg Oral Q7 Days     cloZAPine  300 mg Oral At Bedtime     cloZAPine  50 mg Oral Daily     docusate sodium  100 mg Oral BID     guaiFENesin  600 mg Oral BID     levothyroxine  88 mcg Oral Daily     memantine  10 mg Oral BID     metoprolol tartrate  25 mg Oral BID     polyethylene glycol  17 g Oral Daily     rivastigmine  1 patch Transdermal Daily     rivastigmine   Transdermal Q8H     rivastigmine   Transdermal Q8H     salmeterol  1 puff Inhalation BID     vitamin D3  50 mcg Oral Daily          Allergies:     Allergies   Allergen Reactions     Ibuprofen      Latex           Labs:     No results found for this or any previous visit (from the past 48 hour(s)).       Psychiatric Examination:     /69 (BP Location: Left arm)   Pulse 89   Temp 98.8  F (37.1  C) (Tympanic)   Resp 16   Ht 1.829 m (6')   Wt 89.3 kg (196 lb 14.4 oz)   SpO2 " "98%   BMI 26.70 kg/m    Weight is 196 lbs 14.4 oz  Body mass index is 26.7 kg/m .    Orthostatic Vitals       Most Recent      Lying Orthostatic /86 10/11 0852    Lying Orthostatic Pulse (bpm) 89 10/11 0852    Sitting Orthostatic /72 10/10 1545    Sitting Orthostatic Pulse (bpm) 89 10/10 1545        Appearance: awake, alert and untidy  Attitude: somewhat cooperative   Eye Contact:  poor, often looking away from writer  Mood:  \"fine\" continues to have periods of irritability   Affect:  mood congruent and intensity is flat  Speech:  soft volume, raspy ,dysarthric   Language: fluent and intact in English  Psychomotor, Gait, Musculoskeletal:  no evidence of dystonia, or tics, continues to have movements of buccolingual dyskinesia present during interview  Thought Process: disorganized  Associations:  no loose associations  Thought Content:  denies SI and HI; continues to be delusional and observed responding to internal stimuli during interview, endorses AH, denies VH however appears to be experiencing VH  Insight:  limited  Judgement:  limited  Oriented to:  person  Attention Span and Concentration:  limited  Recent and Remote Memory:  limited  Fund of Knowledge:  delayed    Clinical Global Impressions  First:  Considering your total clinical experience with this particular patient population, how severe are the patient's symptoms at this time?: 7 (07/01/21 0630)  Compared to the patient's condition at the START of treatment, this patient's condition is: 4 (07/01/21 0630)  Most recent:  Considering your total clinical experience with this particular patient population, how severe are the patient's symptoms at this time?: 4 (10/04/21 1307)  Compared to the patient's condition at the START of treatment, this patient's condition is: 2 (10/04/21 1307)         Precautions:     Behavioral Orders   Procedures     Assault precautions     Cheeking Precautions (behavioral units)     Patient Observed swallowing PO " medications; Patient asked to drink water after swallowing medication; Patient in Staff line of sight for 15 minutes after medication given; Mouth checks after PO administration (patient asked to open mouth and stick out their tongue).     Code 1     Elopement precautions     Routine Programming     As clinically indicated     Single Room     Status 15     Every 15 minutes.          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   Stimulant use disorder, in remission.   Transaminitis, possibly medication induced   Hx of CVA  Vit D deficiency  Hypertension  COPD  Hx of infective endocarditis with severe mitral and aortic regurgitation s/p biprostehtic valve replacement 2015  Hx of HFrEF now recovered  Tobacco use disorder  Non-severe malnutrition   Urinary incontinence         Assessment & Plan:   Assessment and hospital summary:  The patient is a 56yo male with a history of schizophrenia and TBI and multiple medical co-morbidities as above who was admitted after being transferred from [facility] medical [facility] after a nearly year-long stay. Is currently under commitment with Yanez recently amended to include Clozaril. While at [facility] was noted to have ongoing agitation and frequently attempting to elope from unit requiring 1:1 staffing for safety. He was started on 1:1 staff upon transfer, this was discontinued as patient has been more cooperative without elopement attempts. IM consult completed on admission and continued to follow due to elevated LFTs. Haldol and VPA discontinued due to LFTs. Cross titration completed from risperidone to clozaril after Yanez amended. EPSE/TD movements noted, have appeared stable over past few weeks. Patient has continued to have disorganization and active psychosis symptoms which appear to be at patients  baseline. Team continues to work on disposition which barriers include patient has no guardian or next of kin willing to act as surrogate decision maker, see CTC notes for complete details. Patient starting to have increasing symptoms noted week of 9/20, clozapine level ordered and noted to be lower than previous, have added cheeking precautions and increased clozapine dose on 9/24 and moved to split dosing given increasing afternoon agitation.    Psychiatric treatment/inteventions:  Medications:   -continue Clozaril 50mg in AM and 300mg at bedtime, started trial of split dosing 9/27 to see if patient better tolerates and targets afternoon agitation better to reduce PRN use; recent level was lower than previous at 389 on 9/20, continue to monitor movements consistent with TD  -continue cheeking precautions, started 9/27 given noted decompensation in recent weeks and lower clozapine level   -continue benztropine 1mg BID for EPSE  -continue memantine 5mg daily for neurocognitive disorder  -continue risperdal 1mg Q6H PRN agitation   -continue lorazepam 0.5-1.0mg Q4H PRN anxiety or severe agitation  -continue rivastigmine patch started 8/2 to target neurocognitive disorder, increased by Dr Tamayo on 10/1 to 9.5mg    Laboratory/Imaging: weekly WBC and ANC for clozapine monitoring continues ANC 10/8 was 2.1, clozapine level 9/20 was 389; repeat clozapine level 10/8 still processing, ;weekly covid negative 10/6     Patient will be treated in therapeutic milieu with appropriate individual and group therapies as described.     Medical treatment/interventions:  Medical concerns:   1) RN reporting increasing malaise and poor appetite over weekend on 10/11  -IM consult placed, appreciate assistance       2) IM consult completed on admission, per consult note on 7/1/21:  Diagnoses:    #Major neurocognitive disorder dt hx of TBI and alcohol use disorder  #Schizophrenia  #Under commitment  Had been residing in group home prior  to admission.  Brought to hospital for evaluation of aggressive behaviors. Group home now unwilling to take him back. Has had numerous CODE 21s called this stay. Seen by psych, meds adjusted. Is currently under civil commitment and court hold through [facility] until 7/31/21. Please see recent discharge summary for details on 6/30/20201.   -Management per psychiatry      #Vitamin D Deficiency- Vit D level 16. Has been in hospital since Sept 2020. Started on cholecalciferol 59141 units on 6/17. Continue high-dose weekly replacement for 6 weeks total. Following high dose replacement recommend starting vitamin D 2000 international unit(s) daily replacement (start date 8/5/2021).      #Possible Tardive Dyskinesia vs EPSE- Per psych assessment on 4/1/21, noted to have possible tardive dyskinesia vs extrapyramidal side effects of meds.  - At that time, recommended to increase Cogentin to 1mg BID and add vitamin E 800 international unit(s) daily.      #Hx of CVA - hx of basal ganglia stroke in 2015. No focal neuro deficits aside from cognitive dysfunction as above.      #Hyperlipidemia - Continue PTA ASA 81 mg daily and atorvastatin 80 mg daily.      #COPD - Not O2 dependent. Continue PTA on salmeterol and albuterol PRN. Patient occasionally refusing inhalers, encourage compliance. Mucinex added for intermittent cough at [facility].      #Hypothyroidism- TSH 3.18 on 9/2020. Repeat recently on 5/29/2021 slightly elevated 5.18, with no T4 . Continue PTA levothyroxine 88 mcg daily. Repeat TSH with reflex to T4.      #Hx of infective endocarditis with severe mitral and aortic regurgitation S/P bioprosthetic valve replacement (10/2015)  #Hx of HFrEF, now recovered, not in acute exacerbation  Follows with Dr. Dockery of [facility], last seen in 1/2020. Echocardiogram in 5/2016 with EF 50%, no evidence for prosthetic mitral and aortic valve dysfunction.   - Follow-up with outpatient Cardiology upon dismissal  from the hospital (on every 2 year follow-ups).  - Continue ASA 81 mg daily      #Tobacco use disorder- Using nicotine patch and PRN gum.     #Non-severe malnutrition- Nutrition consulted and following at OSH.      Ordered CMP, CBC, and TSH with reflex.  No further recommendations at this time. Please page the on-call SHREE for any intercurrent medical issues which arise.      ADDENDUM: TSH normal. CBC normal. ALT elevated at 174, with unsatisfactory AST. Per chart review, LFTs last checked in December with  and . T bili and alk phos.  Reviewed with pharmacy, and possible medications causing would be Haldol, depakote, and statin. Discussed with psych who will taper haldol and Depakote. Holding statin, will check LDL.  Will repeat LFTs in AM, and check CK. Medicine will follow up on repeat LFTs, and CK.      Edith Alfaro PA-C  Hospitalist Service     3) Per updated progress note by IM 7/4:   A/P:  #Transaminitis - slowly rising LFTs since December. Asymptomatic. Reviewed with pharmacy, and possible medications causing would be Haldol, depakote, and statin. Discussed with psych who will taper haldol and Depakote. Holding statin, (total cholesterol 91, with LDL 41). Repeat LFTs continue to rise slightly.   -Abdominal US tomorrow (NPO after midnight)  -Hepatitis B surface ab and agn and Hep C ab   -Repeat LFTs in 3 days     Medicine will follow up on Abdominal US, viral hepatitis studies and repeat LFTs.  No further recommendations at this time. Please page the on-call SHREE for any intercurrent medical issues which arise.      Edith Alfaro PA-C  Hospitalist Service     Per progress note 7/12:      Brief Medicine Note     Medicine following peripherally for LFT monitoring.  LFTs today improved:  (182) and ALT 92 (129). T bili and ALP WNL.      Today's vital signs, medications, and nursing notes were reviewed.       /77 (BP Location: Left arm)   Pulse 110   Temp 97.8  F (36.6  C) (Oral)  "  Resp 16   Ht 1.829 m (6')   Wt 87.5 kg (192 lb 14.4 oz)   SpO2 95%   BMI 26.16 kg/m       Continue current plan of care. Will continue to trend CMP In 3 days to ensure continued downtrend.      Kim Harman PA-C  Hospitalist Service        4) Urinary incontinence and stool incontinence: RN staff first reported urinary incontinence over weekend 7/9, per chart review patient has history of intermittent urinary incontinence going back to at least 2016, continues to have intermittent episodes of incontinence, continues to have both urinary and stool incontinence, placed IM consult for further assessment of need for additional work up given pts limitations as historian, appreciate assistance, no abnormalities noted on evaluation including bladder scan. Patient declining to wear incontinence briefs most days, staff continuing to encourage and started behavioral plan 9/10 to provide patient with pizza 1x per week for cooperation with incontinence cares. Pt did not wear briefs evening of 9/14, more agreeable 9/15.     5) Unvaccinated for Covid-19, see interim history of note 8/19 by writer, attempted to discuss R/B/A of vaccination with patient, he did not demonstrate capacity, has no next of kin acting as surrogate decision maker, is awaiting guardianship, given patients age and medical co-morbidities the risks of receiving Covid vaccine are outweighed by the benefits, including patient is required to be vaccinated to be considered for discharge to some nursing home facilities which would be a more appropriate milieu and less restrictive environment for patient than locked inpatient psychiatric unit. Dr. Nickerson provided second opinion regarding administration of Covid vaccine on 8.20 and agrees with recommendation.   -Covid vaccine order placed 8/23, J&J vaccine given 8/24    6) On 9/17 Pt reported pain in \"heart and arms\", discussed with IM who evaluated patient given significant cardiac history, EKG " completed and patient reported to IM he  was no longer having any pain/discomfort, see IM progress note 9/17 for complete details, agree with assessment and plan as outlined by IM    This note was created by undersigned using a Dragon dictation system. All typing errors or contextual distortion are unintentional and software inherent.     Disposition Plan   Reason for ongoing admission: is unable to care for self due to severe psychosis or mariusz and neurocognitive disorder   Discharge location: [facility], likely locked NH facility , unable to progress on discharge planning due to guardianship not being in place, see CTC notes  Discharge Medications: not ordered  Follow-up Appointments: not scheduled  Legal Status: Full MI commitment and Yanez     Entered by: Jeanette Dobbins on 10/11/2021 at 7:47 AM

## 2021-10-11 NOTE — PLAN OF CARE
Assessment/Intervention/Current Symtoms and Care Coordination  -Chart review  -Pre round meeting with team  -Rounded with team, addressed patient needs/concerns  -Post round meeting with team  Current Symptoms include the following: Psychosis, disorganization and confusion.                   Discharge Plan or Goal  Pending stabilization & development of a safe discharge plan.  Considerations include: locked behavorial unit in nursing home     Barriers to Discharge  The pt does not have a guardian and a guardian has not been found for this pt. Nursing homes are requiring a guardian.     Referral Status  locked behavioral unit in nursing home  no new outside referrals were made today      Legal Status  Patient is under MI commitment in [facility]

## 2021-10-11 NOTE — CONSULTS
"[facility]  Consult Note - Hospitalist Service     Date of Admission: 6/30/2021  Consult Requested by: Dr. Dobbins  Reason for Consult: Malaise, Poor PO Intake    Assessment and Plan  John Juárez is a 55 year old male admitted on 6/30/2021. He has a history of schizophrenia, TBI, alcohol use disorder, COPD, HLD, cardiomyopathy (now resolved), endocarditis s/p bioprosthetic valve replacement (2015), CVA, and hypothyroidism. Please see initial IM consult from 7/1/21 for details regarding chronic medical conditions.    #Malaise  #Poor PO Intake  Non-specific symptoms reported by RN staff. Difficult to obtain history from patient. Possible abdominal pain, but exam benign. Unclear date of last BM.  - CMP, CBC, TSH  - AXR to evaluate for acute pathology and stool burden  - Nutrition Consult    Medicine will follow up on labs and AXR.    ASH Richardson  Securely message with the Vocera Web Console (learn more here)  Text page via [facility] Paging/Directory  ______________________________________________________________________    Interval History  Medicine consulted for poor PO intake and increasing malaise. History primarily obtained by RN staff as patient is a poor historian. Per RN, patient with increasing malaise over the weekend and decreasing PO intake. Previously would get out of bed for meals, but today did not eat breakfast. He did eat some of his lunch.    Patient reports he intermittently has poor PO intake. He reports upper abdominal pain, but when asked further details of this, he begins talking about toe pain and wool slippers. He denies nausea or vomiting. He reports his last BM was this morning, but unable to verify this with staff. Patient reports that \"nothing is wrong\" with him.    Physical Exam  Vital Signs: Temp: 97.1  F (36.2  C) Temp src: Tympanic BP: 102/69 Pulse: 89     SpO2: 98 % O2 Device: None (Room air)    Weight: 196 lbs 14.4 oz    General: Lying in " bed. NAD.  HEENT: Anicteric sclera. Dentition in poor repair.  CV: RRR  Respiratory: Normal effort on RA. Lungs CTAB.  GI: Abdomen is soft, non-tender, and non-distended. Hypoactive bowel sounds.  Extremities: No peripheral edema.  Skin: No visible rashes or jaundice.

## 2021-10-11 NOTE — PLAN OF CARE
"  Problem: Cognitive Impairment (Psychotic Signs/Symptoms)  Goal: Optimal Cognitive Function (Psychotic Signs/Symptoms)  Outcome: No Change       Pt is oriented to self only.  Pt stayed in bed most of the shift.  Pt denies being sick or any having any discomfort.  Pt stated \"am tired.  I want to sleep\".  Pt declined to eat breakfast stated \"am not hungry\".  Encouraged pt to eat lunch which he did.  Pt was med compliant.  Pt continues to talk to himself, appears responding.  Will continue to monitor pt closely.    "

## 2021-10-11 NOTE — PROGRESS NOTES
Brief Medicine Note    Medicine following up on labs and imaging. Please see initial consult note for details. CMP notable for elevated , and per chart review, AST with similar previously elevations. TSH and CBC normal. My personal read of KUB with significant stool burden, likely constipation contributing to poor PO intake. Will follow up formal radiology read.  Patient on multiple anticholingeric medications including rivastigmine and cogentin, likely increasing risk for developing constipation. Could consider adjusting those medications to avoid these side effects.  Increased miralax to BID, added senna-colace 2 tabs BID, and added MOM daily PRN and Bisacodyl suppositories daily PRN.     Medicine will continue to follow up on formal KUB read and effects from bowel medications.  No further recommendations at this time. Please page the on-call SHREE for any intercurrent medical issues which arise.     Edith Alfaro PA-C  Hospitalist Service  Contact information available via [facility] Paging/Directory

## 2021-10-12 LAB
CLOZAPINE SERPL-MCNC: 774 NG/ML
CLOZAPINE+NOR SERPL-MCNC: 1255 NG/ML
CNO SERPL-MCNC: 164 NG/ML
NORCLOZAPINE SERPL-MCNC: 317 NG/ML

## 2021-10-12 PROCEDURE — 99232 SBSQ HOSP IP/OBS MODERATE 35: CPT | Performed by: PSYCHIATRY & NEUROLOGY

## 2021-10-12 PROCEDURE — 250N000013 HC RX MED GY IP 250 OP 250 PS 637: Performed by: PSYCHIATRY & NEUROLOGY

## 2021-10-12 PROCEDURE — 124N000002 HC R&B MH UMMC

## 2021-10-12 PROCEDURE — 250N000013 HC RX MED GY IP 250 OP 250 PS 637: Performed by: PHYSICIAN ASSISTANT

## 2021-10-12 RX ADMIN — MAGNESIUM HYDROXIDE 30 ML: 400 SUSPENSION ORAL at 16:01

## 2021-10-12 RX ADMIN — Medication 50 MCG: at 08:33

## 2021-10-12 RX ADMIN — BENZTROPINE MESYLATE 1 MG: 1 TABLET ORAL at 08:31

## 2021-10-12 RX ADMIN — MEMANTINE 10 MG: 5 TABLET ORAL at 08:32

## 2021-10-12 RX ADMIN — METOPROLOL TARTRATE 25 MG: 25 TABLET, FILM COATED ORAL at 08:32

## 2021-10-12 RX ADMIN — GUAIFENESIN 600 MG: 600 TABLET, EXTENDED RELEASE ORAL at 19:50

## 2021-10-12 RX ADMIN — CLOZAPINE 300 MG: 100 TABLET ORAL at 19:52

## 2021-10-12 RX ADMIN — SALMETEROL XINAFOATE 1 PUFF: 50 POWDER, METERED ORAL; RESPIRATORY (INHALATION) at 08:33

## 2021-10-12 RX ADMIN — SENNOSIDES AND DOCUSATE SODIUM 2 TABLET: 8.6; 5 TABLET ORAL at 08:33

## 2021-10-12 RX ADMIN — ATORVASTATIN CALCIUM 80 MG: 80 TABLET, FILM COATED ORAL at 19:50

## 2021-10-12 RX ADMIN — RIVASTIGMINE 1 PATCH: 9.5 PATCH, EXTENDED RELEASE TRANSDERMAL at 08:31

## 2021-10-12 RX ADMIN — GUAIFENESIN 600 MG: 600 TABLET, EXTENDED RELEASE ORAL at 08:32

## 2021-10-12 RX ADMIN — POLYETHYLENE GLYCOL 3350 17 G: 17 POWDER, FOR SOLUTION ORAL at 19:50

## 2021-10-12 RX ADMIN — ASPIRIN 81 MG CHEWABLE TABLET 81 MG: 81 TABLET CHEWABLE at 08:31

## 2021-10-12 RX ADMIN — SALMETEROL XINAFOATE 1 PUFF: 50 POWDER, METERED ORAL; RESPIRATORY (INHALATION) at 19:53

## 2021-10-12 RX ADMIN — POLYETHYLENE GLYCOL 3350 17 G: 17 POWDER, FOR SOLUTION ORAL at 08:32

## 2021-10-12 RX ADMIN — SENNOSIDES AND DOCUSATE SODIUM 2 TABLET: 8.6; 5 TABLET ORAL at 19:50

## 2021-10-12 RX ADMIN — METOPROLOL TARTRATE 25 MG: 25 TABLET, FILM COATED ORAL at 19:51

## 2021-10-12 RX ADMIN — CLOZAPINE 50 MG: 50 TABLET ORAL at 08:31

## 2021-10-12 RX ADMIN — BENZTROPINE MESYLATE 1 MG: 1 TABLET ORAL at 19:50

## 2021-10-12 RX ADMIN — MEMANTINE 10 MG: 5 TABLET ORAL at 19:50

## 2021-10-12 RX ADMIN — LEVOTHYROXINE SODIUM 88 MCG: 88 TABLET ORAL at 08:32

## 2021-10-12 ASSESSMENT — ACTIVITIES OF DAILY LIVING (ADL)
DRESS: WITH ASSISTANCE
LAUNDRY: UNABLE TO COMPLETE
HYGIENE/GROOMING: PROMPTS

## 2021-10-12 ASSESSMENT — MIFFLIN-ST. JEOR: SCORE: 1736.63

## 2021-10-12 NOTE — PLAN OF CARE
Problem: Psychotic Symptoms  Goal: Social and Therapeutic (Psychotic Symptoms)  Description: Signs and symptoms of listed problems will be absent or manageable.  Recent Flowsheet Documentation  Taken 10/12/2021 1434 by Radha Donnelly RN  Psychotic Symptoms Assessed: all  Psychotic Symptoms Present:    orientation    thought process    mood    affect     Pt is oriented to self only.  Pt showered this morning after being prompted by staff.  Pt continues to have a poor appetite did not eat breakfast, ate 20% of his lunch. Assessed whether he has had a bowel movement today, pt responded by saying yes.  Pt took all his medication.  Pt continues to stare into space, talking to himself appears responding.  Will continue to assess.

## 2021-10-12 NOTE — PLAN OF CARE
Problem: Sleep Disturbance  Goal: Adequate Sleep/Rest  Outcome: Improving     Pt slept a total of 7 hours with no problems identified.

## 2021-10-12 NOTE — PROGRESS NOTES
"[facility], [place]   Psychiatric Progress Note  Hospital Day: 104        Interim History:   The patient's care was discussed with the treatment team during the daily team meeting and/or staff's chart notes were reviewed.  Staff report patient was visible in the milieu, having some outbursts and irritability, had KUB Xray completed which showed large stool burdern, IM increased bowel regimen, taking medications as prescribed, did eat evening meal, staff able to get patient into shower this morning with much encouragement and assistance from several staff members, no acute events overnight.     Upon interview pt was awake but lying in bed, breakfast tray was by bedside, untouched. He reports he continues to be \"tired\" and therefore not wanting to eat, denies feeling nauseated today, reviewed concern for constipation impacting appetite and IM made adjustments to bowel regimen. He reports he has not had a BM this morning. Mood is \"alright, mellow\", denies SI or HI, denies VH. AH are \"still there\" denies any changes. Tolerating medications, he then asked for interview to be terminated \"let me sleep\". No additional concerns.          Medications:       aspirin  81 mg Oral Daily     atorvastatin  80 mg Oral At Bedtime     benztropine  1 mg Oral BID     cholecalciferol  1,250 mcg Oral Q7 Days     cloZAPine  300 mg Oral At Bedtime     cloZAPine  50 mg Oral Daily     guaiFENesin  600 mg Oral BID     levothyroxine  88 mcg Oral Daily     memantine  10 mg Oral BID     metoprolol tartrate  25 mg Oral BID     polyethylene glycol  17 g Oral BID     rivastigmine  1 patch Transdermal Daily     rivastigmine   Transdermal Q8H     rivastigmine   Transdermal Q8H     salmeterol  1 puff Inhalation BID     senna-docusate  2 tablet Oral BID     vitamin D3  50 mcg Oral Daily          Allergies:     Allergies   Allergen Reactions     Ibuprofen      Latex           Labs:     Recent Results (from the past 48 " "hour(s))   Comprehensive metabolic panel    Collection Time: 10/11/21  4:22 PM   Result Value Ref Range    Sodium 139 133 - 144 mmol/L    Potassium 3.7 3.4 - 5.3 mmol/L    Chloride 109 94 - 109 mmol/L    Carbon Dioxide (CO2) 25 20 - 32 mmol/L    Anion Gap 5 3 - 14 mmol/L    Urea Nitrogen 20 7 - 30 mg/dL    Creatinine 0.91 0.66 - 1.25 mg/dL    Calcium 8.6 8.5 - 10.1 mg/dL    Glucose 87 70 - 99 mg/dL    Alkaline Phosphatase 89 40 - 150 U/L     (H) 0 - 45 U/L    ALT 63 0 - 70 U/L    Protein Total 8.1 6.8 - 8.8 g/dL    Albumin 3.2 (L) 3.4 - 5.0 g/dL    Bilirubin Total 0.6 0.2 - 1.3 mg/dL    GFR Estimate >90 >60 mL/min/1.73m2   CBC with platelets    Collection Time: 10/11/21  4:22 PM   Result Value Ref Range    WBC Count 4.9 4.0 - 11.0 10e3/uL    RBC Count 4.95 4.40 - 5.90 10e6/uL    Hemoglobin 15.9 13.3 - 17.7 g/dL    Hematocrit 44.8 40.0 - 53.0 %    MCV 91 78 - 100 fL    MCH 32.1 26.5 - 33.0 pg    MCHC 35.5 31.5 - 36.5 g/dL    RDW 12.1 10.0 - 15.0 %    Platelet Count 219 150 - 450 10e3/uL   TSH with free T4 reflex    Collection Time: 10/11/21  4:22 PM   Result Value Ref Range    TSH 2.24 0.40 - 4.00 mU/L          Psychiatric Examination:     BP 96/68 (BP Location: Left arm)   Pulse 85   Temp 98.6  F (37  C) (Tympanic)   Resp 16   Ht 1.829 m (6')   Wt 89.3 kg (196 lb 14.4 oz)   SpO2 94%   BMI 26.70 kg/m    Weight is 196 lbs 14.4 oz  Body mass index is 26.7 kg/m .    Orthostatic Vitals       Most Recent      Lying Orthostatic /86 10/11 0852    Lying Orthostatic Pulse (bpm) 89 10/11 0852    Sitting Orthostatic /72 10/12 0824    Sitting Orthostatic Pulse (bpm) 103 10/12 0824    Standing Orthostatic /72 10/12 0824    Standing Orthostatic Pulse (bpm) 103 10/12 0824        Appearance: awake, alert and improved hygiene, had shower  Attitude: somewhat cooperative   Eye Contact:  poor, often looking away from writer  Mood:  \"alright, mellow\" continues to have periods of irritability   Affect:  " mood congruent and intensity is flat  Speech:  soft volume, raspy ,dysarthric   Language: fluent and intact in English  Psychomotor, Gait, Musculoskeletal:  no evidence of dystonia, or tics, continues to have movements of buccolingual dyskinesia present during interview  Thought Process: disorganized  Associations:  no loose associations  Thought Content:  denies SI and HI; continues to be delusional and observed responding to internal stimuli during interview, endorses AH, denies VH however appears to be experiencing VH  Insight:  limited  Judgement:  limited  Oriented to:  person  Attention Span and Concentration:  limited  Recent and Remote Memory:  limited  Fund of Knowledge:  delayed    Clinical Global Impressions  First:  Considering your total clinical experience with this particular patient population, how severe are the patient's symptoms at this time?: 7 (07/01/21 0630)  Compared to the patient's condition at the START of treatment, this patient's condition is: 4 (07/01/21 0630)  Most recent:  Considering your total clinical experience with this particular patient population, how severe are the patient's symptoms at this time?: 6 (10/12/21 1031)  Compared to the patient's condition at the START of treatment, this patient's condition is: 3 (10/12/21 1031)         Precautions:     Behavioral Orders   Procedures     Assault precautions     Cheeking Precautions (behavioral units)     Patient Observed swallowing PO medications; Patient asked to drink water after swallowing medication; Patient in Staff line of sight for 15 minutes after medication given; Mouth checks after PO administration (patient asked to open mouth and stick out their tongue).     Code 1     Elopement precautions     Routine Programming     As clinically indicated     Single Room     Status 15     Every 15 minutes.          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   Stimulant use disorder, in remission.   Transaminitis, possibly medication induced   Hx of CVA  Vit D deficiency  Hypertension  COPD  Hx of infective endocarditis with severe mitral and aortic regurgitation s/p biprostehtic valve replacement 2015  Hx of HFrEF now recovered  Tobacco use disorder  Non-severe malnutrition   Urinary incontinence  Constipation          Assessment & Plan:   Assessment and hospital summary:  The patient is a 54yo male with a history of schizophrenia and TBI and multiple medical co-morbidities as above who was admitted after being transferred from [facility] after a nearly year-long stay. Is currently under commitment with Yanez recently amended to include Clozaril. While at [facility] was noted to have ongoing agitation and frequently attempting to elope from unit requiring 1:1 staffing for safety. He was started on 1:1 staff upon transfer, this was discontinued as patient has been more cooperative without elopement attempts. IM consult completed on admission and continued to follow due to elevated LFTs. Haldol and VPA discontinued due to LFTs. Cross titration completed from risperidone to clozaril after Yanez amended. EPSE/TD movements noted, have appeared stable over past few weeks. Patient has continued to have disorganization and active psychosis symptoms which appear to be at patients baseline. Team continues to work on disposition which barriers include patient has no guardian or next of kin willing to act as surrogate decision maker, see [facility] notes for complete details. Patient starting to have increasing symptoms noted week of 9/20, clozapine level ordered and noted to be lower than previous, have added cheeking precautions and increased clozapine dose on 9/24 and moved to split dosing given increasing afternoon agitation. Pt having decreased appetite,  increased fatigue over weekend of 10/9-10/10, IM consult placed 10/11, KUB Xray showed large stool burden, bowel regimen modified.     Psychiatric treatment/inteventions:  Medications:   -continue Clozaril 50mg in AM and 300mg at bedtime, started trial of split dosing 9/27 to see if patient better tolerates and targets afternoon agitation better to reduce PRN use; recent level was lower than previous at 389 on 9/20, continue to monitor movements consistent with TD  -continue cheeking precautions, started 9/27 given noted decompensation in recent weeks and lower clozapine level   -continue benztropine 1mg BID for EPSE  -continue memantine 5mg daily for neurocognitive disorder  -continue risperdal 1mg Q6H PRN agitation   -continue lorazepam 0.5-1.0mg Q4H PRN anxiety or severe agitation  -continue rivastigmine patch started 8/2 to target neurocognitive disorder, increased by Dr Tamayo on 10/1 to 9.5mg    Laboratory/Imaging: weekly WBC and ANC for clozapine monitoring continues ANC 10/8 was 2.1, clozapine level 9/20 was 389; repeat clozapine level 10/8 still processing, ;weekly covid negative 10/6; KUB Xray 10/11 showed large stool burden     Patient will be treated in therapeutic milieu with appropriate individual and group therapies as described.     Medical treatment/interventions:  Medical concerns:   1) RN reporting increasing malaise and poor appetite over weekend on 10/11  -IM consult placed, appreciate assistance, KUB xray ordered along with additional labs, see IM consult note 10/11 and progress notes, agree with assessment and plan as outlined by IM    2) IM consult completed on admission, per consult note on 7/1/21:  Diagnoses:    #Major neurocognitive disorder dt hx of TBI and alcohol use disorder  #Schizophrenia  #Under commitment  Had been residing in group home prior to admission.  Brought to hospital for evaluation of aggressive behaviors. Group home now unwilling to take him back. Has had numerous  CODE 21s called this stay. Seen by psych, meds adjusted. Is currently under civil commitment and court hold through [facility] until 7/31/21. Please see recent discharge summary for details on 6/30/20201.   -Management per psychiatry      #Vitamin D Deficiency- Vit D level 16. Has been in hospital since Sept 2020. Started on cholecalciferol 45165 units on 6/17. Continue high-dose weekly replacement for 6 weeks total. Following high dose replacement recommend starting vitamin D 2000 international unit(s) daily replacement (start date 8/5/2021).      #Possible Tardive Dyskinesia vs EPSE- Per psych assessment on 4/1/21, noted to have possible tardive dyskinesia vs extrapyramidal side effects of meds.  - At that time, recommended to increase Cogentin to 1mg BID and add vitamin E 800 international unit(s) daily.      #Hx of CVA - hx of basal ganglia stroke in 2015. No focal neuro deficits aside from cognitive dysfunction as above.      #Hyperlipidemia - Continue PTA ASA 81 mg daily and atorvastatin 80 mg daily.      #COPD - Not O2 dependent. Continue PTA on salmeterol and albuterol PRN. Patient occasionally refusing inhalers, encourage compliance. Mucinex added for intermittent cough at [facility].      #Hypothyroidism- TSH 3.18 on 9/2020. Repeat recently on 5/29/2021 slightly elevated 5.18, with no T4 . Continue PTA levothyroxine 88 mcg daily. Repeat TSH with reflex to T4.      #Hx of infective endocarditis with severe mitral and aortic regurgitation S/P bioprosthetic valve replacement (10/2015)  #Hx of HFrEF, now recovered, not in acute exacerbation  Follows with Dr. Dockery of Heart and Vascular Cardiology, last seen in 1/2020. Echocardiogram in 5/2016 with EF 50%, no evidence for prosthetic mitral and aortic valve dysfunction.   - Follow-up with outpatient Cardiology upon dismissal from the hospital (on every 2 year follow-ups).  - Continue ASA 81 mg daily      #Tobacco use disorder- Using nicotine patch and PRN  gum.     #Non-severe malnutrition- Nutrition consulted and following at OSH.      Ordered CMP, CBC, and TSH with reflex.  No further recommendations at this time. Please page the on-call SHREE for any intercurrent medical issues which arise.      ADDENDUM: TSH normal. CBC normal. ALT elevated at 174, with unsatisfactory AST. Per chart review, LFTs last checked in December with  and . T bili and alk phos.  Reviewed with pharmacy, and possible medications causing would be Haldol, depakote, and statin. Discussed with psych who will taper haldol and Depakote. Holding statin, will check LDL.  Will repeat LFTs in AM, and check CK. Medicine will follow up on repeat LFTs, and CK.      Edith Alfaro PA-C  Hospitalist Service     3) Per updated progress note by IM 7/4:   A/P:  #Transaminitis - slowly rising LFTs since December. Asymptomatic. Reviewed with pharmacy, and possible medications causing would be Haldol, depakote, and statin. Discussed with psych who will taper haldol and Depakote. Holding statin, (total cholesterol 91, with LDL 41). Repeat LFTs continue to rise slightly.   -Abdominal US tomorrow (NPO after midnight)  -Hepatitis B surface ab and agn and Hep C ab   -Repeat LFTs in 3 days     Medicine will follow up on Abdominal US, viral hepatitis studies and repeat LFTs.  No further recommendations at this time. Please page the on-call SHREE for any intercurrent medical issues which arise.      Edith Alfaro PA-C  Hospitalist Service     Per progress note 7/12:      Brief Medicine Note     Medicine following peripherally for LFT monitoring.  LFTs today improved:  (182) and ALT 92 (129). T bili and ALP WNL.      Today's vital signs, medications, and nursing notes were reviewed.       /77 (BP Location: Left arm)   Pulse 110   Temp 97.8  F (36.6  C) (Oral)   Resp 16   Ht 1.829 m (6')   Wt 87.5 kg (192 lb 14.4 oz)   SpO2 95%   BMI 26.16 kg/m       Continue current plan of care. Will  "continue to trend CMP In 3 days to ensure continued downtrend.      Kim Harman PA-C  Hospitalist Service        4) Urinary incontinence and stool incontinence: RN staff first reported urinary incontinence over weekend 7/9, per chart review patient has history of intermittent urinary incontinence going back to at least 2016, continues to have intermittent episodes of incontinence, continues to have both urinary and stool incontinence, placed IM consult for further assessment of need for additional work up given pts limitations as historian, appreciate assistance, no abnormalities noted on evaluation including bladder scan. Patient declining to wear incontinence briefs most days, staff continuing to encourage and started behavioral plan 9/10 to provide patient with pizza 1x per week for cooperation with incontinence cares. Pt did not wear briefs evening of 9/14, more agreeable 9/15.     5) Unvaccinated for Covid-19, see interim history of note 8/19 by writer, attempted to discuss R/B/A of vaccination with patient, he did not demonstrate capacity, has no next of kin acting as surrogate decision maker, is awaiting guardianship, given patients age and medical co-morbidities the risks of receiving Covid vaccine are outweighed by the benefits, including patient is required to be vaccinated to be considered for discharge to some nursing home facilities which would be a more appropriate milieu and less restrictive environment for patient than locked inpatient psychiatric unit. Dr. Nickerson provided second opinion regarding administration of Covid vaccine on 8.20 and agrees with recommendation.   -Covid vaccine order placed 8/23, J&J vaccine given 8/24    6) On 9/17 Pt reported pain in \"heart and arms\", discussed with IM who evaluated patient given significant cardiac history, EKG completed and patient reported to IM he  was no longer having any pain/discomfort, see IM progress note 9/17 for complete details, agree " with assessment and plan as outlined by IM    This note was created by undersigned using a Dragon dictation system. All typing errors or contextual distortion are unintentional and software inherent.     Disposition Plan   Reason for ongoing admission: is unable to care for self due to severe psychosis or mariusz and neurocognitive disorder   Discharge location: D, likely locked NH facility , unable to progress on discharge planning due to guardianship not being in place, see [facility] notes  Discharge Medications: not ordered  Follow-up Appointments: not scheduled  Legal Status: Full MI commitment and Yanez     Entered by: Jeanette Dobbins on 10/12/2021 at 7:24 AM

## 2021-10-12 NOTE — PLAN OF CARE
"  Problem: Psychotic Symptoms  Goal: Psychotic Symptoms  Description: Signs and symptoms of listed problems will be absent or manageable.  Outcome: Declining  Flowsheets (Taken 10/12/2021 1826)  Psychotic Symptoms Assessed: all  Psychotic Symptoms Present:    orientation    anxiety    insight    memory deficits    thought process    affect    speech      John has been sleeping/napping in his bed all shift. He needed to be aroused to come out and eat dinner. He has been irritable. He has informed this writer that \"The voices in my head are coming out my mouth.\" He has been rambling and not making a lot of sense when speaking. He is refusing any prn's for the voices. He has been isolating all shift. He continues on Elopement, Assault and cheeking precautions. He doesn't drink all of his Miralax, given prn of MOM this evening. Nursing to continue to support current plan of care.     "

## 2021-10-12 NOTE — PROGRESS NOTES
Brief Medicine Note    Medicine re-consulted yesterday for malaise and poor PO intake. Labs unrevealing aside from mild stable AST elevation (106). AXR with non-obstructive bowel gas pattern and large stool burden. Constipation likely 2/2 psychiatric meds. Bowel regimen increased. Patient reported BM today to RN staff, though this was not verified and he is not a wholly reliable historian.    Continue aggressive bowel regimen with Miralax BID, Senna-Docusate 2 tab BID, MOM PRN, and Bisacodyl suppositories PRN. Monitor for results. Notify Medicine if abdominal distention, pain, or n/v develop. May need to repeat AXR in coming days to reassess stool burden if unable to accurately assess stool output.    Medicine will continue to follow for constipation.    Ora Gonsalves PA-C  Hospitalist Service  [phone]

## 2021-10-12 NOTE — PLAN OF CARE
"  Problem: Behavioral Disturbance  Goal: Behavioral Disturbance  Description: Signs and symptoms of listed problems will be absent or manageable by discharge or transition of care.  Outcome: No Change  John has been isolated and withdrawn to his room for the majority of the evening. He has been irritable all evening, erupts loudly with profanity when this writer encourages him to drink some water. ABD. Xray this evening showed high stool burden. He did come out for supper and ate 90% of food on his tray. He reports \"Being tired, and too tired to shower.\" He is disheveled and has body odor. He was cooperative with a lab draw early in the shift. He continues on Cheeking, Elopement and Assault precautions. He has been medication compliant with scheduled and prn medications. He denies any SI/SIB/AH/VH. Nursing to continue to support current plan of care.     "

## 2021-10-13 PROCEDURE — 250N000013 HC RX MED GY IP 250 OP 250 PS 637: Performed by: PHYSICIAN ASSISTANT

## 2021-10-13 PROCEDURE — 250N000013 HC RX MED GY IP 250 OP 250 PS 637: Performed by: PSYCHIATRY & NEUROLOGY

## 2021-10-13 PROCEDURE — 124N000002 HC R&B MH UMMC

## 2021-10-13 RX ORDER — LACTULOSE 10 G/15ML
20 SOLUTION ORAL DAILY
Status: DISCONTINUED | OUTPATIENT
Start: 2021-10-13 | End: 2021-10-16

## 2021-10-13 RX ADMIN — LEVOTHYROXINE SODIUM 88 MCG: 88 TABLET ORAL at 08:14

## 2021-10-13 RX ADMIN — LACTULOSE 20 G: 20 SOLUTION ORAL at 12:31

## 2021-10-13 RX ADMIN — CLOZAPINE 50 MG: 50 TABLET ORAL at 08:14

## 2021-10-13 RX ADMIN — METOPROLOL TARTRATE 25 MG: 25 TABLET, FILM COATED ORAL at 20:31

## 2021-10-13 RX ADMIN — GUAIFENESIN 600 MG: 600 TABLET, EXTENDED RELEASE ORAL at 08:13

## 2021-10-13 RX ADMIN — MEMANTINE 10 MG: 5 TABLET ORAL at 08:13

## 2021-10-13 RX ADMIN — CLOZAPINE 300 MG: 100 TABLET ORAL at 20:31

## 2021-10-13 RX ADMIN — SALMETEROL XINAFOATE 1 PUFF: 50 POWDER, METERED ORAL; RESPIRATORY (INHALATION) at 08:20

## 2021-10-13 RX ADMIN — BENZTROPINE MESYLATE 1 MG: 1 TABLET ORAL at 08:13

## 2021-10-13 RX ADMIN — ASPIRIN 81 MG CHEWABLE TABLET 81 MG: 81 TABLET CHEWABLE at 08:14

## 2021-10-13 RX ADMIN — RIVASTIGMINE 1 PATCH: 9.5 PATCH, EXTENDED RELEASE TRANSDERMAL at 08:14

## 2021-10-13 RX ADMIN — POLYETHYLENE GLYCOL 3350 17 G: 17 POWDER, FOR SOLUTION ORAL at 08:14

## 2021-10-13 RX ADMIN — METOPROLOL TARTRATE 25 MG: 25 TABLET, FILM COATED ORAL at 08:13

## 2021-10-13 RX ADMIN — BENZTROPINE MESYLATE 1 MG: 1 TABLET ORAL at 20:31

## 2021-10-13 RX ADMIN — POLYETHYLENE GLYCOL 3350 17 G: 17 POWDER, FOR SOLUTION ORAL at 20:31

## 2021-10-13 RX ADMIN — SENNOSIDES AND DOCUSATE SODIUM 2 TABLET: 8.6; 5 TABLET ORAL at 20:31

## 2021-10-13 RX ADMIN — ATORVASTATIN CALCIUM 80 MG: 80 TABLET, FILM COATED ORAL at 20:31

## 2021-10-13 RX ADMIN — GUAIFENESIN 600 MG: 600 TABLET, EXTENDED RELEASE ORAL at 20:31

## 2021-10-13 RX ADMIN — SENNOSIDES AND DOCUSATE SODIUM 2 TABLET: 8.6; 5 TABLET ORAL at 08:13

## 2021-10-13 RX ADMIN — Medication 50 MCG: at 08:14

## 2021-10-13 RX ADMIN — MEMANTINE 10 MG: 5 TABLET ORAL at 20:31

## 2021-10-13 ASSESSMENT — ACTIVITIES OF DAILY LIVING (ADL)
HYGIENE/GROOMING: INDEPENDENT;PROMPTS
DRESS: WITH ASSISTANCE
LAUNDRY: UNABLE TO COMPLETE
ORAL_HYGIENE: INDEPENDENT;PROMPTS

## 2021-10-13 NOTE — PLAN OF CARE
"    Problem: Psychotic Symptoms  Goal: Psychotic Symptoms  Description: Signs and symptoms of listed problems will be absent or manageable.  Outcome: No Change       Pt has been isolative to his room reports feeling tired.  Pt denies having pain or any discomfort.  Poor appetite ate 50% of his lunch.  Did not eat breakfast.  Pt's linen were dry this morning except some stool stains noted on linen.  Linen changed.  Staff prompted pt to shower, pt declined to shower pt stated \"am too tired\". Pt was med compliant, was able to finish his miralax this morning and also took the lactolose.  Will continue to assess.     "

## 2021-10-13 NOTE — PLAN OF CARE
Problem: Sleep Disturbance  Goal: Adequate Sleep/Rest  Outcome: Improving     Patient appeared  to have slept for 6.5  hours with no known concerns, safety precautions in place every 15 minutes, No PRN given or requested. Will continue to monitor and offer support as needed

## 2021-10-13 NOTE — PLAN OF CARE
Assessment/Intervention/Current Symtoms and Care Coordination  -Chart review  -Pre round meeting with team  -Rounded with team, addressed patient needs/concerns  -Post round meeting with team  Current Symptoms include the following: Psychosis, disorganization and confusion.        Discharge Plan or Goal  Pending stabilization & development of a safe discharge plan.  Considerations include: locked behavorial unit in nursing home     Barriers to Discharge  The pt does not have a guardian and a guardian has not been found for this pt. Nursing homes are requiring a guardian.     Referral Status  locked behavioral unit in nursing home  no new outside referrals were made today      Legal Status  Patient is under MI commitment in [facility]

## 2021-10-13 NOTE — PLAN OF CARE
BEHAVIORAL TEAM DISCUSSION    Participants:   Dr. Dobbins, Lynn Singh RN, Melissa Fang [facility], Coral Mckeon OT      Progress:   Pt continues with psychiatric symptoms. There is   Provider moved Clozaril meds to all at bedtime due to sedation.    Anticipated length of stay:   3 months    Continued Stay Criteria/Rationale:   The pt does not have a structured place to be discharged to.    Medical/Physical:   Constipation - addressing bowel medication regimen, now has had a BM      Precautions:   Behavioral Orders   Procedures     Assault precautions     Cheeking Precautions (behavioral units)     Patient Observed swallowing PO medications; Patient asked to drink water after swallowing medication; Patient in Staff line of sight for 15 minutes after medication given; Mouth checks after PO administration (patient asked to open mouth and stick out their tongue).     Code 1     Elopement precautions     Routine Programming     As clinically indicated     Single Room     Status 15     Every 15 minutes.     Plan:   Clozaril management  Encourage depends  Assist with ADL's  On civil commitment  Waiting for guardianship issues to resolve      Rationale for change in precautions or plan: no changes

## 2021-10-13 NOTE — PROGRESS NOTES
Brief Medicine Note    Medicine following for constipation likely 2/2 psychiatric meds. Bowel regimen increased on 10/11. Patient reported BM yesterday and today to RN staff, though difficult to determine as he is not a reliable historian. RN did note stool on patient's linens today which is reassuring. No n/v per RN staff.    Start Lactulose 20 g daily. Continue Miralax BID, Senna-Docusate 2 tab BID, MOM PRN, and Bisacodyl suppositories PRN. RN staff do not feel patient would cooperate with taking GoLytely.    Medicine will continue to follow for constipation. Notify our team if n/v, abdominal pain, or distention occur.    Ora Gonsalves PA-C  Hospitalist Service  [phone]

## 2021-10-14 PROCEDURE — 99232 SBSQ HOSP IP/OBS MODERATE 35: CPT | Performed by: PSYCHIATRY & NEUROLOGY

## 2021-10-14 PROCEDURE — 250N000013 HC RX MED GY IP 250 OP 250 PS 637: Performed by: PHYSICIAN ASSISTANT

## 2021-10-14 PROCEDURE — 250N000013 HC RX MED GY IP 250 OP 250 PS 637: Performed by: PSYCHIATRY & NEUROLOGY

## 2021-10-14 PROCEDURE — 124N000002 HC R&B MH UMMC

## 2021-10-14 RX ADMIN — CLOZAPINE 350 MG: 100 TABLET ORAL at 20:58

## 2021-10-14 RX ADMIN — ASPIRIN 81 MG CHEWABLE TABLET 81 MG: 81 TABLET CHEWABLE at 08:43

## 2021-10-14 RX ADMIN — RIVASTIGMINE 1 PATCH: 9.5 PATCH, EXTENDED RELEASE TRANSDERMAL at 08:42

## 2021-10-14 RX ADMIN — GUAIFENESIN 600 MG: 600 TABLET, EXTENDED RELEASE ORAL at 20:58

## 2021-10-14 RX ADMIN — GUAIFENESIN 600 MG: 600 TABLET, EXTENDED RELEASE ORAL at 08:42

## 2021-10-14 RX ADMIN — LEVOTHYROXINE SODIUM 88 MCG: 88 TABLET ORAL at 08:43

## 2021-10-14 RX ADMIN — CHOLECALCIFEROL CAP 1.25 MG (50000 UNIT) 1250 MCG: 1.25 CAP at 08:48

## 2021-10-14 RX ADMIN — BENZTROPINE MESYLATE 1 MG: 1 TABLET ORAL at 20:58

## 2021-10-14 RX ADMIN — MEMANTINE 10 MG: 5 TABLET ORAL at 08:42

## 2021-10-14 RX ADMIN — POLYETHYLENE GLYCOL 3350 17 G: 17 POWDER, FOR SOLUTION ORAL at 20:57

## 2021-10-14 RX ADMIN — Medication 50 MCG: at 08:43

## 2021-10-14 RX ADMIN — SALMETEROL XINAFOATE 1 PUFF: 50 POWDER, METERED ORAL; RESPIRATORY (INHALATION) at 08:46

## 2021-10-14 RX ADMIN — METOPROLOL TARTRATE 25 MG: 25 TABLET, FILM COATED ORAL at 20:58

## 2021-10-14 RX ADMIN — SALMETEROL XINAFOATE 1 PUFF: 50 POWDER, METERED ORAL; RESPIRATORY (INHALATION) at 20:58

## 2021-10-14 RX ADMIN — BENZTROPINE MESYLATE 1 MG: 1 TABLET ORAL at 08:48

## 2021-10-14 RX ADMIN — SENNOSIDES AND DOCUSATE SODIUM 2 TABLET: 8.6; 5 TABLET ORAL at 20:58

## 2021-10-14 RX ADMIN — METOPROLOL TARTRATE 25 MG: 25 TABLET, FILM COATED ORAL at 08:43

## 2021-10-14 RX ADMIN — ATORVASTATIN CALCIUM 80 MG: 80 TABLET, FILM COATED ORAL at 20:58

## 2021-10-14 RX ADMIN — MEMANTINE 10 MG: 5 TABLET ORAL at 20:58

## 2021-10-14 ASSESSMENT — ACTIVITIES OF DAILY LIVING (ADL)
ORAL_HYGIENE: PROMPTS
ORAL_HYGIENE: INDEPENDENT;PROMPTS
HYGIENE/GROOMING: WITH ASSISTANCE
DRESS: WITH ASSISTANCE
HYGIENE/GROOMING: INDEPENDENT;PROMPTS
LAUNDRY: UNABLE TO COMPLETE
LAUNDRY: UNABLE TO COMPLETE
DRESS: WITH ASSISTANCE

## 2021-10-14 NOTE — PLAN OF CARE
Patient presents with a blunted affect. Mood presents as calm. Is cooperative. Speech is mumbled. Eye contact is fair to poor. Alert x2, self and place. Patient denies depression, anxiety, suicidal ideation, SIB, and homicidal ideation. Patient denies auditory/visual hallucinations, yet appears to be responding to internal stimuli; as evidenced by, staring into space and lips moving as if talking to imaginary person or persons. No medical issues noted. Vital signs stable. Medication compliant. No medication cheeking noted. Ate evening meal. Patient out in the milieu briefly at start of this shift. Spent the rest of this shift in his room and in bed. No interaction with staff or peers noted. Did not shower this shift.    /80 (BP Location: Left arm)   Pulse 99   Temp 99.9  F (37.7  C) (Tympanic)   Resp 18   Ht 1.829 m (6')   Wt 87.4 kg (192 lb 9.6 oz)   SpO2 97%   BMI 26.12 kg/m

## 2021-10-14 NOTE — PLAN OF CARE
Problem: Behavioral Health Plan of Care  Goal: Plan of Care Review  Recent Flowsheet Documentation  Taken 10/14/2021 1100 by Lynn Singh RN  Patient Agreement with Plan of Care: unable to participate       Patient agreed to come to the dinning room for breakfast, where he ate 90% of the meal.  After breakfast, the patient returned to his room.  Patient is irritable when asked to perform tasks.  Compliant with medications.  He is encourage to shower, he refused.  Patient asked when he is leaving.  Continue with plan of care.

## 2021-10-14 NOTE — PROGRESS NOTES
"[facility], [place]   Psychiatric Progress Note  Hospital Day: 106        Interim History:   The patient's care was discussed with the treatment team during the daily team meeting and/or staff's chart notes were reviewed.  Staff report patient has been visible in the milieu, continues to report tiredness, taking medications as prescribed, was incontinent of stool and resistant to cares/shower, continues to appear responding to internal stimuli, no acute events overnight.     Upon interview pt was lying in bed awake, continues to state he is \"tired\", discussed plan to move all clozapine to bedtime to see if that helps him sleep more at night and less during day. He is tolerating medications, continues to deny noticing TD movements. Believes he is at [facility], asks when he can go home, again reviewed team working on disposition. Denies SI or HI, denies VH, endorses AH. Was responding during interview, at times very distracted and needing frequent prompts. Encouraged him to take a shower, especially for good hygiene with incontinence episodes, he declines and states again he is tired. No additional concerns.          Medications:       aspirin  81 mg Oral Daily     atorvastatin  80 mg Oral At Bedtime     benztropine  1 mg Oral BID     cholecalciferol  1,250 mcg Oral Q7 Days     cloZAPine  350 mg Oral At Bedtime     guaiFENesin  600 mg Oral BID     lactulose  20 g Oral Daily     levothyroxine  88 mcg Oral Daily     memantine  10 mg Oral BID     metoprolol tartrate  25 mg Oral BID     polyethylene glycol  17 g Oral BID     rivastigmine  1 patch Transdermal Daily     rivastigmine   Transdermal Q8H     rivastigmine   Transdermal Q8H     salmeterol  1 puff Inhalation BID     senna-docusate  2 tablet Oral BID     vitamin D3  50 mcg Oral Daily          Allergies:     Allergies   Allergen Reactions     Ibuprofen      Latex           Labs:     No results found for this or any previous visit (from the " "past 48 hour(s)).       Psychiatric Examination:     /81 (BP Location: Left arm)   Pulse 99   Temp 97.2  F (36.2  C) (Oral)   Resp 18   Ht 1.829 m (6')   Wt 87.4 kg (192 lb 9.6 oz)   SpO2 95%   BMI 26.12 kg/m    Weight is 192 lbs 9.6 oz  Body mass index is 26.12 kg/m .    Orthostatic Vitals       Most Recent      Lying Orthostatic /66 10/14 0921    Lying Orthostatic Pulse (bpm) 82 10/14 0921    Sitting Orthostatic /81 10/13 0856    Sitting Orthostatic Pulse (bpm) 91 10/13 0856        Appearance: awake, alert and poor hygiene   Attitude: somewhat cooperative   Eye Contact:  poor, often looking away from writer  Mood:  \"tired\" continues to have periods of irritability   Affect:  mood congruent and intensity is flat  Speech:  soft volume, raspy ,dysarthric   Language: fluent and intact in English  Psychomotor, Gait, Musculoskeletal:  no evidence of dystonia, or tics, continues to have movements of buccolingual dyskinesia present during interview  Thought Process: disorganized  Associations:  no loose associations  Thought Content:  denies SI and HI; continues to be delusional and observed responding to internal stimuli during interview, endorses AH, denies VH however appears to be experiencing VH  Insight:  limited  Judgement:  limited  Oriented to:  person  Attention Span and Concentration:  limited  Recent and Remote Memory:  limited  Fund of Knowledge:  delayed    Clinical Global Impressions  First:  Considering your total clinical experience with this particular patient population, how severe are the patient's symptoms at this time?: 7 (07/01/21 0630)  Compared to the patient's condition at the START of treatment, this patient's condition is: 4 (07/01/21 0630)  Most recent:  Considering your total clinical experience with this particular patient population, how severe are the patient's symptoms at this time?: 6 (10/12/21 1031)  Compared to the patient's condition at the START of treatment, " this patient's condition is: 3 (10/12/21 1031)         Precautions:     Behavioral Orders   Procedures     Assault precautions     Cheeking Precautions (behavioral units)     Patient Observed swallowing PO medications; Patient asked to drink water after swallowing medication; Patient in Staff line of sight for 15 minutes after medication given; Mouth checks after PO administration (patient asked to open mouth and stick out their tongue).     Code 1     Elopement precautions     Routine Programming     As clinically indicated     Single Room     Status 15     Every 15 minutes.          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   Stimulant use disorder, in remission.   Transaminitis, possibly medication induced   Hx of CVA  Vit D deficiency  Hypertension  COPD  Hx of infective endocarditis with severe mitral and aortic regurgitation s/p biprostehtic valve replacement 2015  Hx of HFrEF now recovered  Tobacco use disorder  Non-severe malnutrition   Urinary incontinence  Constipation          Assessment & Plan:   Assessment and hospital summary:  The patient is a 56yo male with a history of schizophrenia and TBI and multiple medical co-morbidities as above who was admitted after being transferred from [facility] medical [facility] after a nearly year-long stay. Is currently under commitment with [employer] recently amended to include Clozaril. While at [facility] was noted to have ongoing agitation and frequently attempting to elope from unit requiring 1:1 staffing for safety. He was started on 1:1 staff upon transfer, this was discontinued as patient has been more cooperative without elopement attempts. IM consult completed on admission and continued to follow due to elevated LFTs. Haldol and VPA discontinued due to LFTs. Cross titration completed  from risperidone to clozaril after Yanez amended. EPSE/TD movements noted, have appeared stable over past few weeks. Patient has continued to have disorganization and active psychosis symptoms which appear to be at patients baseline. Team continues to work on disposition which barriers include patient has no guardian or next of kin willing to act as surrogate decision maker, see CTC notes for complete details. Patient starting to have increasing symptoms noted week of 9/20, clozapine level ordered and noted to be lower than previous, have added cheeking precautions and increased clozapine dose on 9/24 and moved to split dosing given increasing afternoon agitation. Pt having decreased appetite, increased fatigue over weekend of 10/9-10/10, IM consult placed 10/11, KUB Xray showed large stool burden, bowel regimen modified. Pt was incontinent of stool evening of 10/13.     Psychiatric treatment/inteventions:  Medications:   -consolidate clozapine to bedtime due to daytime sedation, will continue at 350mg at bedtime, started trial of split dosing 9/27 to see if patient better tolerates and targets afternoon agitation better to reduce PRN use however patient having increasing daytime sedation impacting ADLs; recent level improved and therapeutic on 10/8, was lower than previous at 389 on 9/20, continue to monitor movements consistent with TD  -continue cheeking precautions, started 9/27 given noted decompensation in recent weeks and lower clozapine level   -continue benztropine 1mg BID for EPSE  -continue memantine 5mg daily for neurocognitive disorder  -continue risperdal 1mg Q6H PRN agitation   -continue lorazepam 0.5-1.0mg Q4H PRN anxiety or severe agitation  -continue rivastigmine patch started 8/2 to target neurocognitive disorder, increased by Dr Tamayo on 10/1 to 9.5mg    Laboratory/Imaging: weekly WBC and ANC for clozapine monitoring continues ANC 10/8 was 2.1, clozapine level 9/20 was 389; repeat clozapine  level 10/8 774 ; weekly covid ordered      Patient will be treated in therapeutic milieu with appropriate individual and group therapies as described.     Medical treatment/interventions:  Medical concerns:   1) RN reporting increasing malaise and poor appetite over weekend on 10/11  -IM consult placed, appreciate assistance, KUB xray ordered along with additional labs, see IM consult note 10/11 and progress notes, agree with assessment and plan as outlined by IM  -pt had episode of fecal incontinence evening of 10/13    2) IM consult completed on admission, per consult note on 7/1/21:  Diagnoses:    #Major neurocognitive disorder dt hx of TBI and alcohol use disorder  #Schizophrenia  #Under commitment  Had been residing in group home prior to admission.  Brought to hospital for evaluation of aggressive behaviors. Group home now unwilling to take him back. Has had numerous CODE 21s called this stay. Seen by psych, meds adjusted. Is currently under civil commitment and court hold through [facility] until 7/31/21. Please see recent discharge summary for details on 6/30/20201.   -Management per psychiatry      #Vitamin D Deficiency- Vit D level 16. Has been in hospital since Sept 2020. Started on cholecalciferol 02658 units on 6/17. Continue high-dose weekly replacement for 6 weeks total. Following high dose replacement recommend starting vitamin D 2000 international unit(s) daily replacement (start date 8/5/2021).      #Possible Tardive Dyskinesia vs EPSE- Per psych assessment on 4/1/21, noted to have possible tardive dyskinesia vs extrapyramidal side effects of meds.  - At that time, recommended to increase Cogentin to 1mg BID and add vitamin E 800 international unit(s) daily.      #Hx of CVA - hx of basal ganglia stroke in 2015. No focal neuro deficits aside from cognitive dysfunction as above.      #Hyperlipidemia - Continue PTA ASA 81 mg daily and atorvastatin 80 mg daily.      #COPD - Not O2 dependent.  Continue PTA on salmeterol and albuterol PRN. Patient occasionally refusing inhalers, encourage compliance. Mucinex added for intermittent cough at [facility].      #Hypothyroidism- TSH 3.18 on 9/2020. Repeat recently on 5/29/2021 slightly elevated 5.18, with no T4 . Continue PTA levothyroxine 88 mcg daily. Repeat TSH with reflex to T4.      #Hx of infective endocarditis with severe mitral and aortic regurgitation S/P bioprosthetic valve replacement (10/2015)  #Hx of HFrEF, now recovered, not in acute exacerbation  Follows with Dr. Dockery of Heart and Vascular Cardiology, last seen in 1/2020. Echocardiogram in 5/2016 with EF 50%, no evidence for prosthetic mitral and aortic valve dysfunction.   - Follow-up with outpatient Cardiology upon dismissal from the hospital (on every 2 year follow-ups).  - Continue ASA 81 mg daily      #Tobacco use disorder- Using nicotine patch and PRN gum.     #Non-severe malnutrition- Nutrition consulted and following at OSH.      Ordered CMP, CBC, and TSH with reflex.  No further recommendations at this time. Please page the on-call SHREE for any intercurrent medical issues which arise.      ADDENDUM: TSH normal. CBC normal. ALT elevated at 174, with unsatisfactory AST. Per chart review, LFTs last checked in December with  and . T bili and alk phos.  Reviewed with pharmacy, and possible medications causing would be Haldol, depakote, and statin. Discussed with psych who will taper haldol and Depakote. Holding statin, will check LDL.  Will repeat LFTs in AM, and check CK. Medicine will follow up on repeat LFTs, and CK.      Edith Alfaro PA-C  Hospitalist Service     3) Per updated progress note by IM 7/4:   A/P:  #Transaminitis - slowly rising LFTs since December. Asymptomatic. Reviewed with pharmacy, and possible medications causing would be Haldol, depakote, and statin. Discussed with psych who will taper haldol and Depakote. Holding statin, (total cholesterol 91, with  LDL 41). Repeat LFTs continue to rise slightly.   -Abdominal US tomorrow (NPO after midnight)  -Hepatitis B surface ab and agn and Hep C ab   -Repeat LFTs in 3 days     Medicine will follow up on Abdominal US, viral hepatitis studies and repeat LFTs.  No further recommendations at this time. Please page the on-call SHREE for any intercurrent medical issues which arise.      Edith Alfaro PA-C  Hospitalist Service     Per progress note 7/12:      Brief Medicine Note     Medicine following peripherally for LFT monitoring.  LFTs today improved:  (182) and ALT 92 (129). T bili and ALP WNL.      Today's vital signs, medications, and nursing notes were reviewed.       /77 (BP Location: Left arm)   Pulse 110   Temp 97.8  F (36.6  C) (Oral)   Resp 16   Ht 1.829 m (6')   Wt 87.5 kg (192 lb 14.4 oz)   SpO2 95%   BMI 26.16 kg/m       Continue current plan of care. Will continue to trend CMP In 3 days to ensure continued downtrend.      Kim Harman PA-C  Hospitalist Service        4) Urinary incontinence and stool incontinence: RN staff first reported urinary incontinence over weekend 7/9, per chart review patient has history of intermittent urinary incontinence going back to at least 2016, continues to have intermittent episodes of incontinence, continues to have both urinary and stool incontinence, placed IM consult for further assessment of need for additional work up given pts limitations as historian, appreciate assistance, no abnormalities noted on evaluation including bladder scan. Patient declining to wear incontinence briefs most days, staff continuing to encourage and started behavioral plan 9/10 to provide patient with pizza 1x per week for cooperation with incontinence cares. Pt did not wear briefs evening of 9/14, more agreeable 9/15.     5) Unvaccinated for Covid-19, see interim history of note 8/19 by writer, attempted to discuss R/B/A of vaccination with patient, he did not  "demonstrate capacity, has no next of kin acting as surrogate decision maker, is awaiting guardianship, given patients age and medical co-morbidities the risks of receiving Covid vaccine are outweighed by the benefits, including patient is required to be vaccinated to be considered for discharge to some nursing home facilities which would be a more appropriate milieu and less restrictive environment for patient than locked inpatient psychiatric unit. Dr. Nickerson provided second opinion regarding administration of Covid vaccine on 8.20 and agrees with recommendation.   -Covid vaccine order placed 8/23, J&J vaccine given 8/24    6) On 9/17 Pt reported pain in \"heart and arms\", discussed with IM who evaluated patient given significant cardiac history, EKG completed and patient reported to IM he  was no longer having any pain/discomfort, see IM progress note 9/17 for complete details, agree with assessment and plan as outlined by IM    This note was created by undersigned using a Dragon dictation system. All typing errors or contextual distortion are unintentional and software inherent.     Disposition Plan   Reason for ongoing admission: is unable to care for self due to severe psychosis or mariusz and neurocognitive disorder   Discharge location: TBD, likely locked NH facility , unable to progress on discharge planning due to guardianship not being in place, see CTC notes  Discharge Medications: not ordered  Follow-up Appointments: not scheduled  Legal Status: Full MI commitment and Yanez     Entered by: Jeanette Dobbins on 10/14/2021 at 7:42 AM              "

## 2021-10-14 NOTE — PLAN OF CARE
Assessment/Intervention/Current Symtoms and Care Coordination  -Chart review  -Pre round meeting with team  -Rounded with team, addressed patient needs/concerns  -Post round meeting with team  Current Symptoms include the following: Psychosis, disorganization and confusion.        Discharge Plan or Goal  Pending stabilization & development of a safe discharge plan.  Considerations include: locked behavorial unit in nursing home     Barriers to Discharge  The pt does not have a guardian and a guardian has not been found for this pt. Nursing homes are requiring a guardian.     Referral Status  locked behavioral unit in nursing home  no new outside referrals were made today      Legal Status  Patient is under MI commitment in [facility]

## 2021-10-14 NOTE — PLAN OF CARE
"Problem: Sleep Disturbance  Goal: Adequate Sleep/Rest  Outcome: No Change     Pt in bed sleeping at start of shift. Breathing quiet and unlabored.     0115, pt awake in bed. Pt is incontinent of stool. His clothes have stool on top and bottom. Socks soaked in stool. Stool on floor of room and in bathroom. Pt states, \"I shit my pants!\" Pt refuses to shower, but did allow cleansing with hygiene wipes briefly, before yelling \"I'm done with that shit!\" Linens/clothes changed. Writer helped pt with brief as well. Pt then went back to sleep.     Assault, Elopement and Cheeking precautions in addition to Single Room order; any related events noted above.      Appears to have slept 5.75 hours. Will continue to monitor and assess.   "

## 2021-10-15 LAB
BASOPHILS # BLD AUTO: 0.1 10E3/UL (ref 0–0.2)
BASOPHILS NFR BLD AUTO: 1 %
EOSINOPHIL # BLD AUTO: 1.2 10E3/UL (ref 0–0.7)
EOSINOPHIL NFR BLD AUTO: 18 %
ERYTHROCYTE [DISTWIDTH] IN BLOOD BY AUTOMATED COUNT: 12.4 % (ref 10–15)
HCT VFR BLD AUTO: 46.6 % (ref 40–53)
HGB BLD-MCNC: 16 G/DL (ref 13.3–17.7)
IMM GRANULOCYTES # BLD: 0 10E3/UL
IMM GRANULOCYTES NFR BLD: 0 %
LYMPHOCYTES # BLD AUTO: 1.2 10E3/UL (ref 0.8–5.3)
LYMPHOCYTES NFR BLD AUTO: 18 %
MCH RBC QN AUTO: 31.3 PG (ref 26.5–33)
MCHC RBC AUTO-ENTMCNC: 34.3 G/DL (ref 31.5–36.5)
MCV RBC AUTO: 91 FL (ref 78–100)
MONOCYTES # BLD AUTO: 0.6 10E3/UL (ref 0–1.3)
MONOCYTES NFR BLD AUTO: 9 %
NEUTROPHILS # BLD AUTO: 3.4 10E3/UL (ref 1.6–8.3)
NEUTROPHILS NFR BLD AUTO: 54 %
NRBC # BLD AUTO: 0 10E3/UL
NRBC BLD AUTO-RTO: 0 /100
PLATELET # BLD AUTO: 234 10E3/UL (ref 150–450)
RBC # BLD AUTO: 5.11 10E6/UL (ref 4.4–5.9)
SARS-COV-2 RNA RESP QL NAA+PROBE: NEGATIVE
WBC # BLD AUTO: 6.3 10E3/UL (ref 4–11)

## 2021-10-15 PROCEDURE — 250N000013 HC RX MED GY IP 250 OP 250 PS 637: Performed by: PHYSICIAN ASSISTANT

## 2021-10-15 PROCEDURE — U0005 INFEC AGEN DETEC AMPLI PROBE: HCPCS | Performed by: PSYCHIATRY & NEUROLOGY

## 2021-10-15 PROCEDURE — 99232 SBSQ HOSP IP/OBS MODERATE 35: CPT | Performed by: PSYCHIATRY & NEUROLOGY

## 2021-10-15 PROCEDURE — 124N000002 HC R&B MH UMMC

## 2021-10-15 PROCEDURE — 250N000013 HC RX MED GY IP 250 OP 250 PS 637: Performed by: PSYCHIATRY & NEUROLOGY

## 2021-10-15 PROCEDURE — 85025 COMPLETE CBC W/AUTO DIFF WBC: CPT | Performed by: PSYCHIATRY & NEUROLOGY

## 2021-10-15 PROCEDURE — 36415 COLL VENOUS BLD VENIPUNCTURE: CPT | Performed by: PSYCHIATRY & NEUROLOGY

## 2021-10-15 RX ORDER — CLOZAPINE 100 MG/1
400 TABLET ORAL AT BEDTIME
Status: DISCONTINUED | OUTPATIENT
Start: 2021-10-15 | End: 2021-10-21

## 2021-10-15 RX ORDER — BENZTROPINE MESYLATE 0.5 MG/1
0.5 TABLET ORAL 2 TIMES DAILY
Status: DISCONTINUED | OUTPATIENT
Start: 2021-10-15 | End: 2021-10-19

## 2021-10-15 RX ADMIN — Medication 50 MCG: at 08:36

## 2021-10-15 RX ADMIN — LEVOTHYROXINE SODIUM 88 MCG: 88 TABLET ORAL at 08:36

## 2021-10-15 RX ADMIN — ASPIRIN 81 MG CHEWABLE TABLET 81 MG: 81 TABLET CHEWABLE at 08:36

## 2021-10-15 RX ADMIN — MEMANTINE 10 MG: 5 TABLET ORAL at 20:08

## 2021-10-15 RX ADMIN — ATORVASTATIN CALCIUM 80 MG: 80 TABLET, FILM COATED ORAL at 20:07

## 2021-10-15 RX ADMIN — BENZTROPINE MESYLATE 0.5 MG: 0.5 TABLET ORAL at 20:08

## 2021-10-15 RX ADMIN — MEMANTINE 10 MG: 5 TABLET ORAL at 08:36

## 2021-10-15 RX ADMIN — SALMETEROL XINAFOATE 1 PUFF: 50 POWDER, METERED ORAL; RESPIRATORY (INHALATION) at 20:07

## 2021-10-15 RX ADMIN — SALMETEROL XINAFOATE 1 PUFF: 50 POWDER, METERED ORAL; RESPIRATORY (INHALATION) at 08:55

## 2021-10-15 RX ADMIN — GUAIFENESIN 600 MG: 600 TABLET, EXTENDED RELEASE ORAL at 20:07

## 2021-10-15 RX ADMIN — METOPROLOL TARTRATE 25 MG: 25 TABLET, FILM COATED ORAL at 08:35

## 2021-10-15 RX ADMIN — SENNOSIDES AND DOCUSATE SODIUM 2 TABLET: 8.6; 5 TABLET ORAL at 20:07

## 2021-10-15 RX ADMIN — RIVASTIGMINE 1 PATCH: 9.5 PATCH, EXTENDED RELEASE TRANSDERMAL at 08:55

## 2021-10-15 RX ADMIN — BENZTROPINE MESYLATE 1 MG: 1 TABLET ORAL at 08:36

## 2021-10-15 RX ADMIN — METOPROLOL TARTRATE 25 MG: 25 TABLET, FILM COATED ORAL at 20:07

## 2021-10-15 RX ADMIN — SENNOSIDES AND DOCUSATE SODIUM 2 TABLET: 8.6; 5 TABLET ORAL at 08:35

## 2021-10-15 RX ADMIN — CLOZAPINE 400 MG: 100 TABLET ORAL at 20:07

## 2021-10-15 RX ADMIN — POLYETHYLENE GLYCOL 3350 17 G: 17 POWDER, FOR SOLUTION ORAL at 20:08

## 2021-10-15 RX ADMIN — GUAIFENESIN 600 MG: 600 TABLET, EXTENDED RELEASE ORAL at 08:36

## 2021-10-15 RX ADMIN — POLYETHYLENE GLYCOL 3350 17 G: 17 POWDER, FOR SOLUTION ORAL at 08:36

## 2021-10-15 ASSESSMENT — ACTIVITIES OF DAILY LIVING (ADL)
DRESS: SCRUBS (BEHAVIORAL HEALTH)
ORAL_HYGIENE: PROMPTS
LAUNDRY: UNABLE TO COMPLETE
HYGIENE/GROOMING: PROMPTS

## 2021-10-15 NOTE — PLAN OF CARE
Problem: Behavioral Health Plan of Care  Goal: Plan of Care Review  Outcome: No Change     Pt appears to have slept 7 hours this shift.  No concerns reported or noted.  15 minute checks remain ongoing.  Will continue to monitor and support plan of care.

## 2021-10-15 NOTE — PLAN OF CARE
"Problem: Cognitive Impairment (Psychotic Signs/Symptoms)  Goal: Optimal Cognitive Function (Psychotic Signs/Symptoms)  Outcome: No Change     John is isolative. He spends the entire shift in bed. When asked if he wants to come out for snack or to go to the TV lounge like he used to, pt states, \"No. I'm too tired.\" Pt initially refused COVID test, states \"I don't have no fucking coronavirus!\" Eventually he did allow Writer to collect COVID swab in exchange for extra juice. Result: Negative. Pt responds to internal stimuli and endorses hearing voices. Pt looks at staff and mouths words, then tells staff \"I'm talking to my brother-in-law.\" Pt denies SI/HI. He has a flat affect.     At 2000, pt tells Writer, \"I shit my pants.\" Encouraged shower, pt adamantly refuses. Pt allows use of hygienic wipes, then puts on a brief and new pants. Pt accepts HS meds, complies with cheeking precautions, and then lies down to go to sleep.    At 2200, pt is incontinent of stool again. It is all over his bed, room, and clothes. This time, pt showers but refuses to clean hair/face and only cleans body. When asked why he thinks incontinence keeps happening, pt states \"I'm too damn tired\" and that is why he does not make it to the bathroom. Pt denies pain or discomfort.     At 2230, pt states \"I shit my pants again! Fucking shit!\" Briefs and clothes changed, pt used hygienic wipes himself to clean up. As soon as pt puts on new brief, pt states, \"I'm still shitting myself!\" The stool is soft/liquid. Finally, pt is done on the toilet, puts on briefs, and goes to sleep.     Of note, Writer asked if he is not coming in the milieu tonight because he continues to have stool incontinence, pt states, \"yeah it just keeps coming out. I have to be in here, fuck.\" That may be a factor in his isolation.         "

## 2021-10-15 NOTE — PLAN OF CARE
"  Problem: Psychotic Symptoms  Goal: Psychotic Symptoms  Description: Signs and symptoms of listed problems will be absent or manageable.  Outcome: No Change       Pt spent entire shift in bed.  Pt reports being tired.  Poor appetite declined to eat breakfast.  Pt ate 50% of his lunch.  Pt denies nausea and denies having any abdominal discomfort.  Pt took his medication but refused the lactulose this morning.  When asked if he had a bowel movement today pt states \"no\".  Pt continues to stare into space appears responding, observed talking to himself.  Will continue to monitor pt closely.    "

## 2021-10-15 NOTE — PROGRESS NOTES
"[facility], [place]   Psychiatric Progress Note  Hospital Day: 107        Interim History:   The patient's care was discussed with the treatment team during the daily team meeting and/or staff's chart notes were reviewed.  Staff report patient tended to isolate in room yesterday, appears responding to internal stimuli, taking medications as prescribed, irritable, declining to shower, no acute events overnight.     Upon interview pt was lying in bed awake, appeared to be responding to internal stimuli when writer entered. Reports mood is \"tired\", breakfast tray at bedside and he states he is not hungry, denies feeling nauseated or having any abdominal pain. Reports having BM yesterday. Tolerating medications, denies noticing TD. Discussed plan to keep clozapine all at bedtime to help him feel less tired during the day. Denies SI or HI, continues to have AVH. He asked about being able to go home, writer reviewed team is working on disposition, he states writer needs to \"look in your computer for the death records for the mansions in [place], [place] and 2 in [place]\". He was redirected back to treatment plan and encouraged to shower, she declined. No additional concerns.          Medications:       aspirin  81 mg Oral Daily     atorvastatin  80 mg Oral At Bedtime     benztropine  1 mg Oral BID     cholecalciferol  1,250 mcg Oral Q7 Days     cloZAPine  350 mg Oral At Bedtime     guaiFENesin  600 mg Oral BID     lactulose  20 g Oral Daily     levothyroxine  88 mcg Oral Daily     memantine  10 mg Oral BID     metoprolol tartrate  25 mg Oral BID     polyethylene glycol  17 g Oral BID     rivastigmine  1 patch Transdermal Daily     rivastigmine   Transdermal Q8H     rivastigmine   Transdermal Q8H     salmeterol  1 puff Inhalation BID     senna-docusate  2 tablet Oral BID     vitamin D3  50 mcg Oral Daily          Allergies:     Allergies   Allergen Reactions     Ibuprofen      Latex  " "         Labs:     No results found for this or any previous visit (from the past 48 hour(s)).       Psychiatric Examination:     /80 (BP Location: Left arm)   Pulse 99   Temp 99.9  F (37.7  C) (Tympanic)   Resp 18   Ht 1.829 m (6')   Wt 87.4 kg (192 lb 9.6 oz)   SpO2 97%   BMI 26.12 kg/m    Weight is 192 lbs 9.6 oz  Body mass index is 26.12 kg/m .    Orthostatic Vitals       Most Recent      Lying Orthostatic /66 10/14 0921    Lying Orthostatic Pulse (bpm) 82 10/14 0921    Sitting Orthostatic BP 98/76 10/15 0843    Sitting Orthostatic Pulse (bpm) 108 10/15 0843    Standing Orthostatic /71 10/15 0843    Standing Orthostatic Pulse (bpm) 103 10/15 0843        Appearance: awake, alert and poor hygiene   Attitude: somewhat cooperative   Eye Contact:  poor, often looking away from writer  Mood:  \"tired\" continues to have periods of irritability   Affect:  mood congruent and intensity is flat  Speech:  soft volume, raspy ,dysarthric   Language: fluent and intact in English  Psychomotor, Gait, Musculoskeletal:  no evidence of dystonia, or tics, continues to have movements of buccolingual dyskinesia present during interview  Thought Process: disorganized  Associations:  no loose associations  Thought Content:  denies SI and HI; continues to be delusional and observed responding to internal stimuli during interview, endorses AH, denies VH however appears to be experiencing VH  Insight:  limited  Judgement:  limited  Oriented to:  person  Attention Span and Concentration:  limited  Recent and Remote Memory:  limited  Fund of Knowledge:  delayed    Clinical Global Impressions  First:  Considering your total clinical experience with this particular patient population, how severe are the patient's symptoms at this time?: 7 (07/01/21 0630)  Compared to the patient's condition at the START of treatment, this patient's condition is: 4 (07/01/21 0630)  Most recent:  Considering your total clinical experience " with this particular patient population, how severe are the patient's symptoms at this time?: 6 (10/12/21 1031)  Compared to the patient's condition at the START of treatment, this patient's condition is: 3 (10/12/21 1031)         Precautions:     Behavioral Orders   Procedures     Assault precautions     Cheeking Precautions (behavioral units)     Patient Observed swallowing PO medications; Patient asked to drink water after swallowing medication; Patient in Staff line of sight for 15 minutes after medication given; Mouth checks after PO administration (patient asked to open mouth and stick out their tongue).     Code 1     Elopement precautions     Routine Programming     As clinically indicated     Single Room     Status 15     Every 15 minutes.          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   Stimulant use disorder, in remission.   Transaminitis, possibly medication induced   Hx of CVA  Vit D deficiency  Hypertension  COPD  Hx of infective endocarditis with severe mitral and aortic regurgitation s/p biprostehtic valve replacement 2015  Hx of HFrEF now recovered  Tobacco use disorder  Non-severe malnutrition   Urinary incontinence  Constipation          Assessment & Plan:   Assessment and hospital summary:  The patient is a 54yo male with a history of schizophrenia and TBI and multiple medical co-morbidities as above who was admitted after being transferred from [facility] after a nearly year-long stay. Is currently under commitment with Yanez recently amended to include Clozaril. While at [facility] was noted to have ongoing agitation and frequently attempting to elope from unit requiring 1:1 staffing for safety. He was started on 1:1 staff upon transfer, this was discontinued as patient has been more cooperative  without elopement attempts. IM consult completed on admission and continued to follow due to elevated LFTs. Haldol and VPA discontinued due to LFTs. Cross titration completed from risperidone to clozaril after Yanez amended. EPSE/TD movements noted, have appeared stable over past few weeks. Patient has continued to have disorganization and active psychosis symptoms which appear to be at patients baseline. Team continues to work on disposition which barriers include patient has no guardian or next of kin willing to act as surrogate decision maker, see CTC notes for complete details. Patient starting to have increasing symptoms noted week of 9/20, clozapine level ordered and noted to be lower than previous, have added cheeking precautions and increased clozapine dose on 9/24 and moved to split dosing given increasing afternoon agitation. Pt having decreased appetite, increased fatigue over weekend of 10/9-10/10, IM consult placed 10/11, KUB Xray showed large stool burden, bowel regimen modified. Pt was incontinent of stool evening of 10/13. Consolidated clozapine dose to bedtime given concern for daytime sedation with split dosing.     Psychiatric treatment/inteventions:  Medications:   -increase clozapine to 400mg at bedtime; continue to monitor movements consistent with TD, outside of pt reporting feeling tired, no other noted adverse effects from clozapine to indicate toxicity, will monitor closely   -continue cheeking precautions, started 9/27 given noted decompensation in recent weeks and lower clozapine level   -decrease benztropine to 0.5mg BID to reduce anticholinergic load  -continue memantine 10mg BID for neurocognitive disorder (increased by Dr. Nickerson, may consider dose reduction/taper off as patient may not be getting benefit)   -continue risperdal 1mg Q6H PRN agitation   -continue lorazepam 0.5-1.0mg Q4H PRN anxiety or severe agitation  -continue rivastigmine patch started 8/2 to target neurocognitive  disorder, increased by Dr Tamayo on 10/1 to 9.5mg; may consider tapering off/discontinuing in future as patient has not seemed to have benefit and to reduce anticholinergic load    Laboratory/Imaging: weekly WBC and ANC for clozapine monitoring continues ANC 10/8 was 2.1, clozapine level 9/20 was 389; repeat clozapine level 10/8 774 ; weekly covid ordered      Patient will be treated in therapeutic milieu with appropriate individual and group therapies as described.     Medical treatment/interventions:  Medical concerns:   1) RN reporting increasing malaise and poor appetite over weekend on 10/11  -IM consult placed, appreciate assistance, KUB xray ordered along with additional labs, see IM consult note 10/11 and progress notes, agree with assessment and plan as outlined by IM  -pt had episode of fecal incontinence evening of 10/13, bowel regimen continues to be monitored and adjusted per IM     2) IM consult completed on admission, per consult note on 7/1/21:  Diagnoses:    #Major neurocognitive disorder dt hx of TBI and alcohol use disorder  #Schizophrenia  #Under commitment  Had been residing in group home prior to admission.  Brought to hospital for evaluation of aggressive behaviors. Group home now unwilling to take him back. Has had numerous CODE 21s called this stay. Seen by psych, meds adjusted. Is currently under civil commitment and court hold through [facility] until 7/31/21. Please see recent discharge summary for details on 6/30/20201.   -Management per psychiatry      #Vitamin D Deficiency- Vit D level 16. Has been in hospital since Sept 2020. Started on cholecalciferol 61070 units on 6/17. Continue high-dose weekly replacement for 6 weeks total. Following high dose replacement recommend starting vitamin D 2000 international unit(s) daily replacement (start date 8/5/2021).      #Possible Tardive Dyskinesia vs EPSE- Per psych assessment on 4/1/21, noted to have possible tardive dyskinesia vs  extrapyramidal side effects of meds.  - At that time, recommended to increase Cogentin to 1mg BID and add vitamin E 800 international unit(s) daily.      #Hx of CVA - hx of basal ganglia stroke in 2015. No focal neuro deficits aside from cognitive dysfunction as above.      #Hyperlipidemia - Continue PTA ASA 81 mg daily and atorvastatin 80 mg daily.      #COPD - Not O2 dependent. Continue PTA on salmeterol and albuterol PRN. Patient occasionally refusing inhalers, encourage compliance. Mucinex added for intermittent cough at [facility].      #Hypothyroidism- TSH 3.18 on 9/2020. Repeat recently on 5/29/2021 slightly elevated 5.18, with no T4 . Continue PTA levothyroxine 88 mcg daily. Repeat TSH with reflex to T4.      #Hx of infective endocarditis with severe mitral and aortic regurgitation S/P bioprosthetic valve replacement (10/2015)  #Hx of HFrEF, now recovered, not in acute exacerbation  Follows with Dr. Dockery of Heart and Vascular Cardiology, last seen in 1/2020. Echocardiogram in 5/2016 with EF 50%, no evidence for prosthetic mitral and aortic valve dysfunction.   - Follow-up with outpatient Cardiology upon dismissal from the hospital (on every 2 year follow-ups).  - Continue ASA 81 mg daily      #Tobacco use disorder- Using nicotine patch and PRN gum.     #Non-severe malnutrition- Nutrition consulted and following at OSH.      Ordered CMP, CBC, and TSH with reflex.  No further recommendations at this time. Please page the on-call SHREE for any intercurrent medical issues which arise.      ADDENDUM: TSH normal. CBC normal. ALT elevated at 174, with unsatisfactory AST. Per chart review, LFTs last checked in December with  and . T bili and alk phos.  Reviewed with pharmacy, and possible medications causing would be Haldol, depakote, and statin. Discussed with psych who will taper haldol and Depakote. Holding statin, will check LDL.  Will repeat LFTs in AM, and check CK. Medicine will follow up on  repeat LFTs, and CK.      Edith Alfaro PA-C  Hospitalist Service     3) Per updated progress note by IM 7/4:   A/P:  #Transaminitis - slowly rising LFTs since December. Asymptomatic. Reviewed with pharmacy, and possible medications causing would be Haldol, depakote, and statin. Discussed with psych who will taper haldol and Depakote. Holding statin, (total cholesterol 91, with LDL 41). Repeat LFTs continue to rise slightly.   -Abdominal US tomorrow (NPO after midnight)  -Hepatitis B surface ab and agn and Hep C ab   -Repeat LFTs in 3 days     Medicine will follow up on Abdominal US, viral hepatitis studies and repeat LFTs.  No further recommendations at this time. Please page the on-call SHREE for any intercurrent medical issues which arise.      Edith Alfaro PA-C  Hospitalist Service     Per progress note 7/12:      Brief Medicine Note     Medicine following peripherally for LFT monitoring.  LFTs today improved:  (182) and ALT 92 (129). T bili and ALP WNL.      Today's vital signs, medications, and nursing notes were reviewed.       /77 (BP Location: Left arm)   Pulse 110   Temp 97.8  F (36.6  C) (Oral)   Resp 16   Ht 1.829 m (6')   Wt 87.5 kg (192 lb 14.4 oz)   SpO2 95%   BMI 26.16 kg/m       Continue current plan of care. Will continue to trend CMP In 3 days to ensure continued downtrend.      Kim Harman PA-C  Hospitalist Service        4) Urinary incontinence and stool incontinence: RN staff first reported urinary incontinence over weekend 7/9, per chart review patient has history of intermittent urinary incontinence going back to at least 2016, continues to have intermittent episodes of incontinence, continues to have both urinary and stool incontinence, placed IM consult for further assessment of need for additional work up given pts limitations as historian, appreciate assistance, no abnormalities noted on evaluation including bladder scan. Patient declining to wear  "incontinence briefs most days, staff continuing to encourage and started behavioral plan 9/10 to provide patient with pizza 1x per week for cooperation with incontinence cares. Pt did not wear briefs evening of 9/14, more agreeable 9/15.     5) Unvaccinated for Covid-19, see interim history of note 8/19 by writer, attempted to discuss R/B/A of vaccination with patient, he did not demonstrate capacity, has no next of kin acting as surrogate decision maker, is awaiting guardianship, given patients age and medical co-morbidities the risks of receiving Covid vaccine are outweighed by the benefits, including patient is required to be vaccinated to be considered for discharge to some nursing home facilities which would be a more appropriate milieu and less restrictive environment for patient than locked inpatient psychiatric unit. Dr. Nickerson provided second opinion regarding administration of Covid vaccine on 8.20 and agrees with recommendation.   -Covid vaccine order placed 8/23, J&J vaccine given 8/24    6) On 9/17 Pt reported pain in \"heart and arms\", discussed with IM who evaluated patient given significant cardiac history, EKG completed and patient reported to IM he  was no longer having any pain/discomfort, see IM progress note 9/17 for complete details, agree with assessment and plan as outlined by IM    This note was created by undersigned using a Dragon dictation system. All typing errors or contextual distortion are unintentional and software inherent.     Disposition Plan   Reason for ongoing admission: is unable to care for self due to severe psychosis or mariusz and neurocognitive disorder   Discharge location: D, likely locked NH facility , unable to progress on discharge planning due to guardianship not being in place, see CTC notes  Discharge Medications: not ordered  Follow-up Appointments: not scheduled  Legal Status: Full MI commitment and Yanez     Entered by: Jeanette Dobbins on 10/15/2021 at 6:35 " AM

## 2021-10-15 NOTE — PROGRESS NOTES
Brief Medicine Note    Medicine following for constipation likely 2/2 psychiatric meds. Bowel regimen increased on 10/11 and 10/13. Patient will large BM on 10/14, no BM yet today. Per RN, no n/v, abdominal pain, or distention.    Continue Lactulose 20 g daily until having more regular BMs then can change to PRN. Note patient refused dose today, please encourage compliance.    Continue Miralax BID, Senna-Docusate 2 tab BID, MOM PRN, and Bisacodyl suppositories PRN indefinitely unless loose stools develop.    Medicine will continue to follow for constipation. Notify our team if n/v, abdominal pain, or distention occur.    Ora Gonsalves PA-C  Hospitalist Service  [phone]

## 2021-10-15 NOTE — PLAN OF CARE
Assessment/Intervention/Current Symtoms and Care Coordination  -Chart review  -Pre round meeting with team  -Rounded with team, addressed patient needs/concerns  -Post round meeting with team  Current Symptoms include the following: Psychosis, disorganization and confusion.        Discharge Plan or Goal  Pending stabilization & development of a safe discharge plan.  Considerations include: locked behavorial unit in nursing home     Barriers to Discharge  The pt does not have a guardian and a guardian has not been found for this pt. Nursing homes are requiring a guardian.     Referral Status  locked behavioral unit in nursing home  no new outside referrals were made today      Legal Status  Patient is under MI commitment in [facility] [place]

## 2021-10-16 PROCEDURE — 250N000013 HC RX MED GY IP 250 OP 250 PS 637: Performed by: PSYCHIATRY & NEUROLOGY

## 2021-10-16 PROCEDURE — 124N000002 HC R&B MH UMMC

## 2021-10-16 PROCEDURE — 250N000013 HC RX MED GY IP 250 OP 250 PS 637: Performed by: PHYSICIAN ASSISTANT

## 2021-10-16 RX ORDER — LOPERAMIDE HCL 2 MG
2 CAPSULE ORAL 4 TIMES DAILY PRN
Status: DISCONTINUED | OUTPATIENT
Start: 2021-10-16 | End: 2021-10-16

## 2021-10-16 RX ADMIN — SALMETEROL XINAFOATE 1 PUFF: 50 POWDER, METERED ORAL; RESPIRATORY (INHALATION) at 20:34

## 2021-10-16 RX ADMIN — GUAIFENESIN 600 MG: 600 TABLET, EXTENDED RELEASE ORAL at 09:07

## 2021-10-16 RX ADMIN — RIVASTIGMINE 1 PATCH: 9.5 PATCH, EXTENDED RELEASE TRANSDERMAL at 09:08

## 2021-10-16 RX ADMIN — LOPERAMIDE HYDROCHLORIDE 2 MG: 2 CAPSULE ORAL at 03:40

## 2021-10-16 RX ADMIN — Medication 50 MCG: at 09:07

## 2021-10-16 RX ADMIN — ASPIRIN 81 MG CHEWABLE TABLET 81 MG: 81 TABLET CHEWABLE at 09:08

## 2021-10-16 RX ADMIN — MEMANTINE 10 MG: 5 TABLET ORAL at 09:08

## 2021-10-16 RX ADMIN — METOPROLOL TARTRATE 25 MG: 25 TABLET, FILM COATED ORAL at 20:33

## 2021-10-16 RX ADMIN — SALMETEROL XINAFOATE 1 PUFF: 50 POWDER, METERED ORAL; RESPIRATORY (INHALATION) at 09:09

## 2021-10-16 RX ADMIN — BENZTROPINE MESYLATE 0.5 MG: 0.5 TABLET ORAL at 20:33

## 2021-10-16 RX ADMIN — GUAIFENESIN 600 MG: 600 TABLET, EXTENDED RELEASE ORAL at 20:33

## 2021-10-16 RX ADMIN — CLOZAPINE 400 MG: 100 TABLET ORAL at 20:33

## 2021-10-16 RX ADMIN — LEVOTHYROXINE SODIUM 88 MCG: 88 TABLET ORAL at 09:08

## 2021-10-16 RX ADMIN — METOPROLOL TARTRATE 25 MG: 25 TABLET, FILM COATED ORAL at 09:07

## 2021-10-16 RX ADMIN — BENZTROPINE MESYLATE 0.5 MG: 0.5 TABLET ORAL at 09:08

## 2021-10-16 RX ADMIN — MEMANTINE 10 MG: 5 TABLET ORAL at 20:34

## 2021-10-16 RX ADMIN — ATORVASTATIN CALCIUM 80 MG: 80 TABLET, FILM COATED ORAL at 20:33

## 2021-10-16 ASSESSMENT — ACTIVITIES OF DAILY LIVING (ADL): HYGIENE/GROOMING: WITH ASSISTANCE;WITH SUPERVISION

## 2021-10-16 NOTE — PLAN OF CARE
Problem: Sleep Disturbance  Goal: Adequate Sleep/Rest  Outcome: No Change     At shift change pt incontinent BM.    0200: Pt incontinent large BM.  Pt asleep at this time & had removed his brief w/ BM and BM was scattered throughout floor.  BM scattered in pt's bed linens, on pt's limbs, and throughout room and bathroom.  D/t pt being covered w/ large amount of BM that was difficult to clean, pt took shower w/ staff assist at this time.  Was difficult to assess consistency of BM as it appeared BM had dried, medium brown color.  Bed linens changed and room cleaned.  Fluids encouraged and pt compliant w/ this.  Pt noted unsteady on his feet at times however this improved the longer pt was awake.     On-call provider was contacted & ordered prn imodium.    0330: during rounds BM odor coming from pt's room again. This time, pt incontinent med/large liquid brown BM. Pt received prn imodium at this time. Pt was assisted to toilet and cleaned self w/ staff assist of 1 at times.  Pt assisted back to bed w/ clean brief and scrubs.    Pt appears to have slept 5.25 hours this shift.  Pt on elopement, assault, fall and cheeking precautions.  15 minute checks remain ongoing.  Will continue to monitor and support plan of care.

## 2021-10-16 NOTE — PLAN OF CARE
"Problem: Behavioral Health Plan of Care  Goal: Absence of New-Onset Illness or Injury  Outcome: No Change     John reports his mood as \"okay.\" He thinks he is at [facility] and that the year is 1999. He does not remember why he is here. Pt oriented to self only. He has a flat affect and engages in internal stimuli. He endorses hearing voices and denies visual hallucinations. Pt remains isolative the entire shift, lying in bed, stating \"I'm fucking tired.\" When asked why he thinks he is so tired, pt states, \"I need alcohol in my system.\" Pt ate 75% of dinner. He denies pain or discomfort.     At 1600, pt incontinent of stool, pt states, \"I shit my pants.\" Stool is loose. Cleansed with hygienic wipes, changed clothes and brief.     At 2030, pt incontinent of stool, continues to refuse shower. Cleansed with hygienic wipes, changed clothes and briefs. Miralax and Senna held d/t parameters requiring holding med if pt has loose stools. Pt accepts HS meds, complies with cheeking precautions, and goes to sleep.  "

## 2021-10-16 NOTE — PROVIDER NOTIFICATION
"   10/16/21 1259   Vital Signs   Temp 96.8  F (36  C)   Temp src Tympanic   Resp 16   Pulse 86   Pulse Rate Source Monitor   /69   BP Location Right arm       Pt has been laying in bed all day, occasionally has been up to use the bathroom.  Pt reports poor appetite declined to eat breakfast and lunch.  Pt denies abdominal pain or nausea.  No loose stools this shift and pt reports no bowel movement today.   Pt encouraged to drink fluids which he did drank 480 ml of water.  Pt is urinating.  Pt was encouraged to eat, pt requested for peanut butter jelly toast.  Pt was able to eat and tolerated it well.  Denies any pain or any discomfort.  Pt c/o of being \"tired\".  Will continue to monitor pt closely.    "

## 2021-10-17 PROCEDURE — 250N000013 HC RX MED GY IP 250 OP 250 PS 637: Performed by: PSYCHIATRY & NEUROLOGY

## 2021-10-17 PROCEDURE — 250N000013 HC RX MED GY IP 250 OP 250 PS 637: Performed by: PHYSICIAN ASSISTANT

## 2021-10-17 PROCEDURE — 124N000002 HC R&B MH UMMC

## 2021-10-17 RX ORDER — POLYETHYLENE GLYCOL 3350 17 G/17G
17 POWDER, FOR SOLUTION ORAL DAILY PRN
Status: DISCONTINUED | OUTPATIENT
Start: 2021-10-17 | End: 2021-11-26

## 2021-10-17 RX ORDER — POLYETHYLENE GLYCOL 3350 17 G/17G
17 POWDER, FOR SOLUTION ORAL DAILY
Status: DISCONTINUED | OUTPATIENT
Start: 2021-10-18 | End: 2021-10-29

## 2021-10-17 RX ORDER — AMOXICILLIN 250 MG
2 CAPSULE ORAL DAILY
Status: DISCONTINUED | OUTPATIENT
Start: 2021-10-18 | End: 2021-10-29

## 2021-10-17 RX ADMIN — METOPROLOL TARTRATE 25 MG: 25 TABLET, FILM COATED ORAL at 20:42

## 2021-10-17 RX ADMIN — METOPROLOL TARTRATE 25 MG: 25 TABLET, FILM COATED ORAL at 08:26

## 2021-10-17 RX ADMIN — BENZTROPINE MESYLATE 0.5 MG: 0.5 TABLET ORAL at 20:42

## 2021-10-17 RX ADMIN — ATORVASTATIN CALCIUM 80 MG: 80 TABLET, FILM COATED ORAL at 20:42

## 2021-10-17 RX ADMIN — LEVOTHYROXINE SODIUM 88 MCG: 88 TABLET ORAL at 08:26

## 2021-10-17 RX ADMIN — RIVASTIGMINE 1 PATCH: 9.5 PATCH, EXTENDED RELEASE TRANSDERMAL at 08:26

## 2021-10-17 RX ADMIN — GUAIFENESIN 600 MG: 600 TABLET, EXTENDED RELEASE ORAL at 20:42

## 2021-10-17 RX ADMIN — Medication 50 MCG: at 08:26

## 2021-10-17 RX ADMIN — ASPIRIN 81 MG CHEWABLE TABLET 81 MG: 81 TABLET CHEWABLE at 08:26

## 2021-10-17 RX ADMIN — CLOZAPINE 400 MG: 100 TABLET ORAL at 20:41

## 2021-10-17 RX ADMIN — MEMANTINE 10 MG: 5 TABLET ORAL at 20:41

## 2021-10-17 RX ADMIN — BENZTROPINE MESYLATE 0.5 MG: 0.5 TABLET ORAL at 08:26

## 2021-10-17 RX ADMIN — GUAIFENESIN 600 MG: 600 TABLET, EXTENDED RELEASE ORAL at 08:26

## 2021-10-17 RX ADMIN — SALMETEROL XINAFOATE 1 PUFF: 50 POWDER, METERED ORAL; RESPIRATORY (INHALATION) at 08:27

## 2021-10-17 RX ADMIN — MEMANTINE 10 MG: 5 TABLET ORAL at 08:26

## 2021-10-17 RX ADMIN — SALMETEROL XINAFOATE 1 PUFF: 50 POWDER, METERED ORAL; RESPIRATORY (INHALATION) at 20:42

## 2021-10-17 ASSESSMENT — ACTIVITIES OF DAILY LIVING (ADL)
LAUNDRY: UNABLE TO COMPLETE
DRESS: SCRUBS (BEHAVIORAL HEALTH);WITH ASSISTANCE
HYGIENE/GROOMING: PROMPTS;WITH ASSISTANCE
ORAL_HYGIENE: PROMPTS

## 2021-10-17 NOTE — PLAN OF CARE
"Problem: Psychotic Symptoms  Goal: Psychotic Symptoms  Description: Signs and symptoms of listed problems will be absent or manageable.  Outcome: No Change     John remains calm and behaviorally in control. Per baseline, pt endorses hearing voices that are not bothersome. Pt denies SI/HI/visual hallucinations. He is only oriented to self. He continues to have baseline lingual tardive movements that are also not bothersome to him. Pt is isolative and spends entire shift in bed, states \"I'm fucking tired.\"    At 1545, Writer asked if pt needs new briefs and pt states, \"yeah I shit myself.\" However, when briefs changed, there is just a scant streak of BM and pt is not actually incontinent.     Vitals at 1600 included BP 88/63 and temp 99.4. Pt just endorses fatigue, no pain or other complaints. Increased fluids--pt accepts multiple juices and awan. Later, BP 99/68 and temp 98.0. Vitals now WNL.    Pt only ate 25% of dinner and refused HS snack. Pt had no episodes of stool incontinence this shift. He accepts HS meds, complies with cheeking precautions, and went to bed.    "

## 2021-10-17 NOTE — PROGRESS NOTES
Brief Medicine Note    Medicine following for constipation likely 2/2 psychiatric meds. Bowel regimen increased on 10/11 and 10/13. Patient with large BM on 10/14 and multiple large BMs overnight 10/15-10/16 for which he received PRN Imodium.    Discontinue Lactulose 20 g daily. Continue Miralax BID, Senna-Docusate 2 tab BID, MOM PRN, and Bisacodyl suppositories PRN. If loose stools develop, can either hold meds that day or decrease scheduled bowel meds from BID to daily.    Would not recommend using Imodium if patient has frequent BMs as patient is highly prone to constipation with current psych meds. Instead, would adjust bowel meds as above.    Ora Gonsalves PA-C  Hospitalist Service  [phone]

## 2021-10-17 NOTE — PLAN OF CARE
Problem: Psychotic Symptoms  Goal: Psychotic Symptoms  Description: Signs and symptoms of listed problems will be absent or manageable.  Outcome: No Change       Pt's affect is flat blunted with a calm mood.  Pt endorses auditory hallucination.  Pt has been isolating in his room, spent the entire shift in bed reports being tired.  Pt was able to eat 75% of his breakfast.  Pt did not eat lunch.  Pt has been drinking.   Pt denies abdominal pain or any discomfort.  No nausea.  Pt reports no loose stools this shift.  Pt was med compliant.  Will continue to monitor pt closely.

## 2021-10-17 NOTE — PLAN OF CARE
Problem: Behavioral Health Plan of Care  Goal: Plan of Care Review  Outcome: No Change     Pt appears to have slept 7 hours this shift.  No episodes of BM incontinence this shift.  No concerns reported or noted.  Pt on elopement, fall, assault & cheeking precautions; single room status.  15 minute checks remain ongoing.  Will continue to monitor and support plan of care.

## 2021-10-17 NOTE — PROGRESS NOTES
Brief Medicine Note    Medicine following for constipation likely 2/2 psychiatric meds. Bowel regimen increased on 10/11 and 10/13. Patient with two loose BMs daily for past 2 days.    Decrease Miralax and Senna-Docusate to daily. Continue MOM and Bisacodyl suppositories PRN. If loose stools develop, can hold bowel meds that day.     Would not recommend discontinuing scheduled bowel regimen or giving Imodium given high risk for constipation.    Medicine will sign off. Please page the on-call SHREE for any intercurrent medical issues.    Ora Gonsalves PA-C  Hospitalist Service  [phone]

## 2021-10-18 PROCEDURE — 99207 PR NO CHARGE LOS: CPT | Performed by: NURSE PRACTITIONER

## 2021-10-18 PROCEDURE — 250N000013 HC RX MED GY IP 250 OP 250 PS 637: Performed by: PHYSICIAN ASSISTANT

## 2021-10-18 PROCEDURE — 124N000002 HC R&B MH UMMC

## 2021-10-18 PROCEDURE — 250N000013 HC RX MED GY IP 250 OP 250 PS 637: Performed by: PSYCHIATRY & NEUROLOGY

## 2021-10-18 PROCEDURE — 99232 SBSQ HOSP IP/OBS MODERATE 35: CPT | Performed by: PSYCHIATRY & NEUROLOGY

## 2021-10-18 RX ORDER — RIVASTIGMINE 9.5 MG/24H
1 PATCH, EXTENDED RELEASE TRANSDERMAL DAILY
Status: DISCONTINUED | OUTPATIENT
Start: 2021-10-19 | End: 2021-10-18

## 2021-10-18 RX ORDER — RIVASTIGMINE 4.6 MG/24H
1 PATCH, EXTENDED RELEASE TRANSDERMAL DAILY
Status: DISCONTINUED | OUTPATIENT
Start: 2021-10-19 | End: 2021-10-21

## 2021-10-18 RX ORDER — MEMANTINE HYDROCHLORIDE 5 MG/1
5 TABLET ORAL 2 TIMES DAILY
Status: DISCONTINUED | OUTPATIENT
Start: 2021-10-18 | End: 2021-11-16

## 2021-10-18 RX ADMIN — POLYETHYLENE GLYCOL 3350 17 G: 17 POWDER, FOR SOLUTION ORAL at 09:43

## 2021-10-18 RX ADMIN — RIVASTIGMINE 1 PATCH: 9.5 PATCH, EXTENDED RELEASE TRANSDERMAL at 09:45

## 2021-10-18 RX ADMIN — LEVOTHYROXINE SODIUM 88 MCG: 88 TABLET ORAL at 09:42

## 2021-10-18 RX ADMIN — DOCUSATE SODIUM AND SENNOSIDES 2 TABLET: 8.6; 5 TABLET ORAL at 09:46

## 2021-10-18 RX ADMIN — MEMANTINE 10 MG: 5 TABLET ORAL at 09:42

## 2021-10-18 RX ADMIN — BENZTROPINE MESYLATE 0.5 MG: 0.5 TABLET ORAL at 09:42

## 2021-10-18 RX ADMIN — MEMANTINE 5 MG: 5 TABLET ORAL at 21:05

## 2021-10-18 RX ADMIN — CLOZAPINE 400 MG: 100 TABLET ORAL at 21:04

## 2021-10-18 RX ADMIN — SALMETEROL XINAFOATE 1 PUFF: 50 POWDER, METERED ORAL; RESPIRATORY (INHALATION) at 09:46

## 2021-10-18 RX ADMIN — GUAIFENESIN 600 MG: 600 TABLET, EXTENDED RELEASE ORAL at 09:42

## 2021-10-18 RX ADMIN — ASPIRIN 81 MG CHEWABLE TABLET 81 MG: 81 TABLET CHEWABLE at 09:42

## 2021-10-18 RX ADMIN — METOPROLOL TARTRATE 25 MG: 25 TABLET, FILM COATED ORAL at 21:05

## 2021-10-18 RX ADMIN — GUAIFENESIN 600 MG: 600 TABLET, EXTENDED RELEASE ORAL at 21:04

## 2021-10-18 RX ADMIN — BENZTROPINE MESYLATE 0.5 MG: 0.5 TABLET ORAL at 21:05

## 2021-10-18 RX ADMIN — ATORVASTATIN CALCIUM 80 MG: 80 TABLET, FILM COATED ORAL at 21:05

## 2021-10-18 RX ADMIN — METOPROLOL TARTRATE 25 MG: 25 TABLET, FILM COATED ORAL at 09:43

## 2021-10-18 RX ADMIN — Medication 50 MCG: at 09:46

## 2021-10-18 ASSESSMENT — ACTIVITIES OF DAILY LIVING (ADL)
ADLS_ACUITY_SCORE: 7
DRESS: SCRUBS (BEHAVIORAL HEALTH);PROMPTS
ADLS_ACUITY_SCORE: 7
HYGIENE/GROOMING: PROMPTS
ORAL_HYGIENE: PROMPTS
ADLS_ACUITY_SCORE: 7

## 2021-10-18 NOTE — PLAN OF CARE
Assessment/Intervention/Current Symtoms and Care Coordination  -Chart review  -Pre round meeting with team  -Rounded with team, addressed patient needs/concerns  -Post round meeting with team  Current Symptoms include the following: Psychosis, disorganization and confusion.        Discharge Plan or Goal  Pending stabilization & development of a safe discharge plan.  Considerations include: locked behavorial unit in nursing home     Barriers to Discharge  The pt does not have a guardian and a guardian has not been found for this pt. Nursing homes are requiring a guardian.     Referral Status  locked behavioral unit in nursing home  no new outside referrals were made today      Legal Status  Patient is under MI commitment in [facility]

## 2021-10-18 NOTE — PROGRESS NOTES
BRIEF MEDICINE NOTE:    Contacted by bedside RN regarding temperature up to 99.5 and patient feeling fatigued    Chart was reviewed. Discussed with bedside RN by phone who notes that patient's complaint is of feeling tired. He is denying other physical symptoms on RN assessment. There is no concern for abnormal vitals at this time (I would consider 99.5 acceptable temperature)    Plan:  1. Notify hospital medicine if patient were to develop fever >101 (stated in vital sign orders) or if concern for infection arises.    Call with further question.    Inocencia Henderson, APRN, ACNPC-AG  BA, BSN-RN, CNRN, TCRN  Pgr [phone]

## 2021-10-18 NOTE — PLAN OF CARE
Problem: Cognitive Impairment (Psychotic Signs/Symptoms)  Goal: Optimal Cognitive Function (Psychotic Signs/Symptoms)  Outcome: No Change     John remains behaviorally in control. His mood is calm and he is pleasant with Writer. Endorses hearing voices, per baseline. Denies depression/SI/HI/visual hallucinations. Pt responds to internal stimuli. He has a flat affect. He denies pain or discomfort. No episodes of stool incontinence this shift. Pt reports he has not had a BM today, but his memory deficits can make him an inaccurate .    Pt is isolative. Spends entire shift in bed. Pt eats less than 25% of his dinner, only drinking his juices, water, Gatorade, and eating his dessert. Fluid intake this shift is good despite poor nutritional intake. Vitals WNL.    Pt compliant with HS meds and cheeking precautions and goes to sleep.

## 2021-10-18 NOTE — PROGRESS NOTES
"[facility], [place]   Psychiatric Progress Note  Hospital Day: 110        Interim History:   The patient's care was discussed with the treatment team during the daily team meeting and/or staff's chart notes were reviewed.  Staff report patient had several episodes of stool incontinence over weekend, taking medications as prescribed, IM adjusting bowel regimen and has now signed off, continues to isolate and reports being tired, only oriented to self, continues to respond to internal stimuli and have TD movements, no acute events over weekend.     Upon interview pt was lying in bed sleeping, awoke to voice, continues to report mood as \"Tired\". He denied SI or HI, continues to have AH, denies they have changed since last week. Denies VH. Denies side effects to medications outside of feeling tired, continues to deny noticing TD movements. Reports having bowel movements, denies having any pain or physical discomfort, did not eat breakfast, states he is \"not hungry\", he believes he is at [facility], not oriented to time/year either. Continues to be irritable during interview requesting it to be completed due to his being tired. No additional concerns.          Medications:       aspirin  81 mg Oral Daily     atorvastatin  80 mg Oral At Bedtime     benztropine  0.5 mg Oral BID     cholecalciferol  1,250 mcg Oral Q7 Days     cloZAPine  400 mg Oral At Bedtime     guaiFENesin  600 mg Oral BID     levothyroxine  88 mcg Oral Daily     memantine  10 mg Oral BID     metoprolol tartrate  25 mg Oral BID     polyethylene glycol  17 g Oral Daily     rivastigmine  1 patch Transdermal Daily     rivastigmine   Transdermal Q8H     salmeterol  1 puff Inhalation BID     senna-docusate  2 tablet Oral Daily     vitamin D3  50 mcg Oral Daily          Allergies:     Allergies   Allergen Reactions     Ibuprofen      Latex           Labs:     No results found for this or any previous visit (from the past 48 " "hour(s)).       Psychiatric Examination:     BP 99/68   Pulse 105   Temp 98  F (36.7  C) (Tympanic)   Resp 16   Ht 1.829 m (6')   Wt 87.4 kg (192 lb 9.6 oz)   SpO2 95%   BMI 26.12 kg/m    Weight is 192 lbs 9.6 oz  Body mass index is 26.12 kg/m .    Orthostatic Vitals       Most Recent      Lying Orthostatic /77 10/18 1416    Lying Orthostatic Pulse (bpm) 94 10/18 1416    Sitting Orthostatic /73 10/17 0853    Sitting Orthostatic Pulse (bpm) 84 10/17 0853        Appearance: awake, alert and poor hygiene   Attitude: less cooperative   Eye Contact:  poor, often looking away from writer  Mood:  \"tired\" continues to have periods of irritability   Affect:  mood congruent and intensity is flat  Speech:  soft volume, raspy ,dysarthric   Language: fluent and intact in English  Psychomotor, Gait, Musculoskeletal:  no evidence of dystonia, or tics, continues to have movements of buccolingual dyskinesia present during interview  Thought Process: disorganized  Associations:  no loose associations  Thought Content:  denies SI and HI; continues to be delusional and observed responding to internal stimuli during interview, endorses AH, denies VH however appears to be experiencing VH per staff observations  Insight:  limited  Judgement:  limited  Oriented to:  person  Attention Span and Concentration:  limited  Recent and Remote Memory:  limited  Fund of Knowledge:  delayed    Clinical Global Impressions  First:  Considering your total clinical experience with this particular patient population, how severe are the patient's symptoms at this time?: 7 (07/01/21 0630)  Compared to the patient's condition at the START of treatment, this patient's condition is: 4 (07/01/21 0630)  Most recent:  Considering your total clinical experience with this particular patient population, how severe are the patient's symptoms at this time?: 6 (10/12/21 1031)  Compared to the patient's condition at the START of treatment, this " patient's condition is: 3 (10/12/21 1031)         Precautions:     Behavioral Orders   Procedures     Assault precautions     Cheeking Precautions (behavioral units)     Patient Observed swallowing PO medications; Patient asked to drink water after swallowing medication; Patient in Staff line of sight for 15 minutes after medication given; Mouth checks after PO administration (patient asked to open mouth and stick out their tongue).     Code 1     Elopement precautions     Fall precautions     Routine Programming     As clinically indicated     Single Room     Status 15     Every 15 minutes.          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   Stimulant use disorder, in remission.   Transaminitis, possibly medication induced   Hx of CVA  Vit D deficiency  Hypertension  COPD  Hx of infective endocarditis with severe mitral and aortic regurgitation s/p biprostehtic valve replacement 2015  Hx of HFrEF now recovered  Tobacco use disorder  Non-severe malnutrition   Urinary incontinence  Constipation          Assessment & Plan:   Assessment and hospital summary:  The patient is a 56yo male with a history of schizophrenia and TBI and multiple medical co-morbidities as above who was admitted after being transferred from [facility] after a nearly year-long stay. Is currently under commitment with [facility] recently amended to include Clozaril. While at [facility] was noted to have ongoing agitation and frequently attempting to elope from unit requiring 1:1 staffing for safety. He was started on 1:1 staff upon transfer, this was discontinued as patient has been more cooperative without elopement attempts. IM consult completed on admission and continued to follow due to elevated LFTs. Haldol and VPA discontinued due to LFTs. Cross  titration completed from risperidone to clozaril after Yanez amended. EPSE/TD movements noted, have appeared stable over past few weeks. Patient has continued to have disorganization and active psychosis symptoms which appear to be at patients baseline. Team continues to work on disposition which barriers include patient has no guardian or next of kin willing to act as surrogate decision maker, see CTC notes for complete details. Patient starting to have increasing symptoms noted week of 9/20, clozapine level ordered and noted to be lower than previous, have added cheeking precautions and increased clozapine dose on 9/24 and moved to split dosing given increasing afternoon agitation. Pt having decreased appetite, increased fatigue over weekend of 10/9-10/10, IM consult placed 10/11, KUB Xray showed large stool burden, bowel regimen modified. Pt was incontinent of stool evening of 10/13. Consolidated clozapine dose to bedtime given concern for daytime sedation with split dosing.     Psychiatric treatment/inteventions:  Medications:   -continue clozapine 400mg at bedtime; continue to monitor movements consistent with TD, outside of pt reporting feeling tired, no other noted adverse effects from clozapine to indicate toxicity, will monitor closely   -continue cheeking precautions, started 9/27 given noted decompensation in recent weeks and lower clozapine level   -continue benztropine 0.5mg BID, will continue to plan to taper to reduce anticholinergic load  -decrease memantine to 5mg BID for neurocognitive disorder, starting dose reduction/taper off as patient does not appear to have benefit and reporting oversedation and to reduce anticholinergic load  -continue risperdal 1mg Q6H PRN agitation   -continue lorazepam 0.5-1.0mg Q4H PRN anxiety or severe agitation  -decrease rivastigmine patch started 8/2 to target neurocognitive disorder, increased by Dr Tamayo on 10/1 to 9.5mg; will reduce to 4.5mg tomorrow as  patient has not seemed to have benefit and to reduce anticholinergic load    Laboratory/Imaging: weekly WBC and ANC for clozapine monitoring continues ANC 10/15 was 3.4, clozapine level 9/20 was 389; repeat clozapine level 10/8 774 ; weekly covid ordered      Patient will be treated in therapeutic milieu with appropriate individual and group therapies as described.     Medical treatment/interventions:  Medical concerns:   1) RN reporting increasing malaise and poor appetite over weekend on 10/11  -IM consult placed, appreciate assistance, KUB xray ordered along with additional labs, see IM consult note 10/11 and progress notes, agree with assessment and plan as outlined by IM  -pt had episode of fecal incontinence evening of 10/13, bowel regimen continues to be monitored and adjusted per IM, see most recent progress note 10/17 for complete details     2) IM consult completed on admission, per consult note on 7/1/21:  Diagnoses:    #Major neurocognitive disorder dt hx of TBI and alcohol use disorder  #Schizophrenia  #Under commitment  Had been residing in group home prior to admission.  Brought to hospital for evaluation of aggressive behaviors. Group home now unwilling to take him back. Has had numerous CODE 21s called this stay. Seen by psych, meds adjusted. Is currently under civil commitment and court hold through [facility] until 7/31/21. Please see recent discharge summary for details on 6/30/20201.   -Management per psychiatry      #Vitamin D Deficiency- Vit D level 16. Has been in hospital since Sept 2020. Started on cholecalciferol 61192 units on 6/17. Continue high-dose weekly replacement for 6 weeks total. Following high dose replacement recommend starting vitamin D 2000 international unit(s) daily replacement (start date 8/5/2021).      #Possible Tardive Dyskinesia vs EPSE- Per psych assessment on 4/1/21, noted to have possible tardive dyskinesia vs extrapyramidal side effects of meds.  - At that  time, recommended to increase Cogentin to 1mg BID and add vitamin E 800 international unit(s) daily.      #Hx of CVA - hx of basal ganglia stroke in 2015. No focal neuro deficits aside from cognitive dysfunction as above.      #Hyperlipidemia - Continue PTA ASA 81 mg daily and atorvastatin 80 mg daily.      #COPD - Not O2 dependent. Continue PTA on salmeterol and albuterol PRN. Patient occasionally refusing inhalers, encourage compliance. Mucinex added for intermittent cough at [facility].      #Hypothyroidism- TSH 3.18 on 9/2020. Repeat recently on 5/29/2021 slightly elevated 5.18, with no T4 . Continue PTA levothyroxine 88 mcg daily. Repeat TSH with reflex to T4.      #Hx of infective endocarditis with severe mitral and aortic regurgitation S/P bioprosthetic valve replacement (10/2015)  #Hx of HFrEF, now recovered, not in acute exacerbation  Follows with Dr. Dockery of Heart and Vascular Cardiology, last seen in 1/2020. Echocardiogram in 5/2016 with EF 50%, no evidence for prosthetic mitral and aortic valve dysfunction.   - Follow-up with outpatient Cardiology upon dismissal from the hospital (on every 2 year follow-ups).  - Continue ASA 81 mg daily      #Tobacco use disorder- Using nicotine patch and PRN gum.     #Non-severe malnutrition- Nutrition consulted and following at OSH.      Ordered CMP, CBC, and TSH with reflex.  No further recommendations at this time. Please page the on-call SHREE for any intercurrent medical issues which arise.      ADDENDUM: TSH normal. CBC normal. ALT elevated at 174, with unsatisfactory AST. Per chart review, LFTs last checked in December with  and . T bili and alk phos.  Reviewed with pharmacy, and possible medications causing would be Haldol, depakote, and statin. Discussed with psych who will taper haldol and Depakote. Holding statin, will check LDL.  Will repeat LFTs in AM, and check CK. Medicine will follow up on repeat LFTs, and CK.      Edith Alfaro,  MADHAVI  Hospitalist Service     3) Per updated progress note by IM 7/4:   A/P:  #Transaminitis - slowly rising LFTs since December. Asymptomatic. Reviewed with pharmacy, and possible medications causing would be Haldol, depakote, and statin. Discussed with psych who will taper haldol and Depakote. Holding statin, (total cholesterol 91, with LDL 41). Repeat LFTs continue to rise slightly.   -Abdominal US tomorrow (NPO after midnight)  -Hepatitis B surface ab and agn and Hep C ab   -Repeat LFTs in 3 days     Medicine will follow up on Abdominal US, viral hepatitis studies and repeat LFTs.  No further recommendations at this time. Please page the on-call SHREE for any intercurrent medical issues which arise.      Edith Alfaro PA-C  Hospitalist Service     Per progress note 7/12:      Brief Medicine Note     Medicine following peripherally for LFT monitoring.  LFTs today improved:  (182) and ALT 92 (129). T bili and ALP WNL.      Today's vital signs, medications, and nursing notes were reviewed.       /77 (BP Location: Left arm)   Pulse 110   Temp 97.8  F (36.6  C) (Oral)   Resp 16   Ht 1.829 m (6')   Wt 87.5 kg (192 lb 14.4 oz)   SpO2 95%   BMI 26.16 kg/m       Continue current plan of care. Will continue to trend CMP In 3 days to ensure continued downtrend.      Kim Harman PA-C  Hospitalist Service        4) Urinary incontinence and stool incontinence: RN staff first reported urinary incontinence over weekend 7/9, per chart review patient has history of intermittent urinary incontinence going back to at least 2016, continues to have intermittent episodes of incontinence, continues to have both urinary and stool incontinence, placed IM consult for further assessment of need for additional work up given pts limitations as historian, appreciate assistance, no abnormalities noted on evaluation including bladder scan. Patient declining to wear incontinence briefs most days, staff continuing to  "encourage and started behavioral plan 9/10 to provide patient with pizza 1x per week for cooperation with incontinence cares. Pt did not wear briefs evening of 9/14, more agreeable 9/15.     5) Unvaccinated for Covid-19, see interim history of note 8/19 by writer, attempted to discuss R/B/A of vaccination with patient, he did not demonstrate capacity, has no next of kin acting as surrogate decision maker, is awaiting guardianship, given patients age and medical co-morbidities the risks of receiving Covid vaccine are outweighed by the benefits, including patient is required to be vaccinated to be considered for discharge to some nursing home facilities which would be a more appropriate milieu and less restrictive environment for patient than locked inpatient psychiatric unit. Dr. Nickerson provided second opinion regarding administration of Covid vaccine on 8.20 and agrees with recommendation.   -Covid vaccine order placed 8/23, J&J vaccine given 8/24    6) On 9/17 Pt reported pain in \"heart and arms\", discussed with IM who evaluated patient given significant cardiac history, EKG completed and patient reported to IM he  was no longer having any pain/discomfort, see IM progress note 9/17 for complete details, agree with assessment and plan as outlined by IM    This note was created by undersigned using a Dragon dictation system. All typing errors or contextual distortion are unintentional and software inherent.     Disposition Plan   Reason for ongoing admission: is unable to care for self due to severe psychosis or mariusz and neurocognitive disorder   Discharge location: D, likely locked NH facility , unable to progress on discharge planning due to guardianship not being in place, see CTC notes  Discharge Medications: not ordered  Follow-up Appointments: not scheduled  Legal Status: Full MI commitment and [facility]     Entered by: Jeanette Dobbins on 10/18/2021 at 7:08 AM              "

## 2021-10-18 NOTE — PLAN OF CARE
Problem: Behavioral Health Plan of Care  Goal: Plan of Care Review  Outcome: No Change     Pt appears to have slept 7 hours this shift.  No episodes of BM incontinence this shift.  No acute events overnight.  Pt on elopement, fall, assault & cheeking precautions; single room status.  15 minute checks remain ongoing.  Will continue to monitor and support plan of care.

## 2021-10-18 NOTE — PLAN OF CARE
Problem: Psychotic Symptoms  Goal: Psychotic Symptoms  Description: Signs and symptoms of listed problems will be absent or manageable.  Outcome: No Change   Patient has been lying in bed all day. Declined to get out of bed and walk. Did not eat breakfast. Ate half of lunch with staff sitting with him. Drank approximately 360ccs of Gatorade. Denies depression and anxiety. Has auditory hallucinations. Denies pain. C/o feeling tired. Temp 99.5 tympanic at noon. Dr. Dobbins notified and she said notify IM. IM was notified and asked if there are any other symptoms beside feeling tired. See IM note.

## 2021-10-19 PROCEDURE — 250N000013 HC RX MED GY IP 250 OP 250 PS 637: Performed by: PSYCHIATRY & NEUROLOGY

## 2021-10-19 PROCEDURE — 99232 SBSQ HOSP IP/OBS MODERATE 35: CPT | Performed by: PSYCHIATRY & NEUROLOGY

## 2021-10-19 PROCEDURE — 124N000002 HC R&B MH UMMC

## 2021-10-19 PROCEDURE — 250N000013 HC RX MED GY IP 250 OP 250 PS 637: Performed by: PHYSICIAN ASSISTANT

## 2021-10-19 RX ADMIN — ASPIRIN 81 MG CHEWABLE TABLET 81 MG: 81 TABLET CHEWABLE at 08:59

## 2021-10-19 RX ADMIN — MEMANTINE 5 MG: 5 TABLET ORAL at 08:59

## 2021-10-19 RX ADMIN — CLOZAPINE 400 MG: 100 TABLET ORAL at 20:57

## 2021-10-19 RX ADMIN — METOPROLOL TARTRATE 25 MG: 25 TABLET, FILM COATED ORAL at 20:57

## 2021-10-19 RX ADMIN — DOCUSATE SODIUM AND SENNOSIDES 2 TABLET: 8.6; 5 TABLET ORAL at 08:58

## 2021-10-19 RX ADMIN — ATORVASTATIN CALCIUM 80 MG: 80 TABLET, FILM COATED ORAL at 20:57

## 2021-10-19 RX ADMIN — Medication 50 MCG: at 08:59

## 2021-10-19 RX ADMIN — Medication 0.25 MG: at 20:56

## 2021-10-19 RX ADMIN — POLYETHYLENE GLYCOL 3350 17 G: 17 POWDER, FOR SOLUTION ORAL at 09:00

## 2021-10-19 RX ADMIN — MEMANTINE 5 MG: 5 TABLET ORAL at 20:57

## 2021-10-19 RX ADMIN — LEVOTHYROXINE SODIUM 88 MCG: 88 TABLET ORAL at 08:59

## 2021-10-19 RX ADMIN — SALMETEROL XINAFOATE 1 PUFF: 50 POWDER, METERED ORAL; RESPIRATORY (INHALATION) at 08:58

## 2021-10-19 RX ADMIN — SALMETEROL XINAFOATE 1 PUFF: 50 POWDER, METERED ORAL; RESPIRATORY (INHALATION) at 20:58

## 2021-10-19 RX ADMIN — BENZTROPINE MESYLATE 0.5 MG: 0.5 TABLET ORAL at 08:59

## 2021-10-19 RX ADMIN — METOPROLOL TARTRATE 25 MG: 25 TABLET, FILM COATED ORAL at 08:59

## 2021-10-19 RX ADMIN — RIVASTIGMINE 1 PATCH: 4.6 PATCH TRANSDERMAL at 08:59

## 2021-10-19 RX ADMIN — GUAIFENESIN 600 MG: 600 TABLET, EXTENDED RELEASE ORAL at 08:59

## 2021-10-19 ASSESSMENT — ACTIVITIES OF DAILY LIVING (ADL)
ADLS_ACUITY_SCORE: 7
LAUNDRY: UNABLE TO COMPLETE
ORAL_HYGIENE: PROMPTS
ADLS_ACUITY_SCORE: 7
HYGIENE/GROOMING: PROMPTS
DRESS: SCRUBS (BEHAVIORAL HEALTH);PROMPTS
ADLS_ACUITY_SCORE: 7
ADLS_ACUITY_SCORE: 7

## 2021-10-19 NOTE — PLAN OF CARE
Assessment/Intervention/Current Symtoms and Care Coordination  -Chart review  -Pre round meeting with team  -Rounded with team, addressed patient needs/concerns  -Post round meeting with team  Current Symptoms include the following: Psychosis, disorganization and confusion.        Discharge Plan or Goal  Considerations include: locked behavorial unit in nursing home     Barriers to Discharge  The pt does not have a guardian and a guardian has not been found for this pt. Nursing homes are requiring a guardian.     Referral Status  locked behavioral unit in nursing home  no new outside referrals were made today      Legal Status  Patient is under MI commitment in [facility]

## 2021-10-19 NOTE — PROGRESS NOTES
"[facility], [place]   Psychiatric Progress Note  Hospital Day: 111        Interim History:   The patient's care was discussed with the treatment team during the daily team meeting and/or staff's chart notes were reviewed.  Staff report patient isolated to room yesterday, stayed in bed throughout the day, had poor nutritional intake but fluid intake was good, taking medications as prescribed, IM notified of patients status including elevated temp at 99.5 and recommended no intervention and ongoing monitoring, no acute events overnight.     Upon interview pt was sleeping, awoke to voice after multiple attempts, states mood is \"tired\", he denies feeling depressed or anxious with prompts, denies SI or HI. Reports he is having AH \"people in my head\", denies that they have gotten worse. Has been able to sleep more easily and the AH used to keep him awake. He denies having any pain or discomfort. Encouraged him to eat meals, had breakfast at bedside and stated he was not hungry. Denies side effects to medications outside of feeling tired, denies noticing TD, not additional concerns.          Medications:       aspirin  81 mg Oral Daily     atorvastatin  80 mg Oral At Bedtime     benztropine  0.5 mg Oral BID     cholecalciferol  1,250 mcg Oral Q7 Days     cloZAPine  400 mg Oral At Bedtime     guaiFENesin  600 mg Oral BID     levothyroxine  88 mcg Oral Daily     memantine  5 mg Oral BID     metoprolol tartrate  25 mg Oral BID     polyethylene glycol  17 g Oral Daily     rivastigmine  1 patch Transdermal Daily     rivastigmine   Transdermal Q8H     salmeterol  1 puff Inhalation BID     senna-docusate  2 tablet Oral Daily     vitamin D3  50 mcg Oral Daily          Allergies:     Allergies   Allergen Reactions     Ibuprofen      Latex           Labs:     No results found for this or any previous visit (from the past 48 hour(s)).       Psychiatric Examination:     /74 (BP Location: Right arm)  " " Pulse 89   Temp 99.4  F (37.4  C) (Tympanic)   Resp 16   Ht 1.829 m (6')   Wt 87.4 kg (192 lb 9.6 oz)   SpO2 96%   BMI 26.12 kg/m    Weight is 192 lbs 9.6 oz  Body mass index is 26.12 kg/m .    Orthostatic Vitals       Most Recent      Lying Orthostatic /77 10/18 1416    Lying Orthostatic Pulse (bpm) 94 10/18 1416    Standing Orthostatic /72 10/19 0856    Standing Orthostatic Pulse (bpm) 106 10/19 0856        Appearance: awake, alert and poor hygiene   Attitude: minimally cooperative   Eye Contact:  fair, would close eyes but when open looking at writer  Mood:  \"tired\" continues to have periods of irritability   Affect:  mood congruent and intensity is flat  Speech:  soft volume, raspy ,dysarthric   Language: fluent and intact in English  Psychomotor, Gait, Musculoskeletal:  no evidence of dystonia, or tics, continues to have movements of buccolingual dyskinesia present at times, minimal TD noted today on interview   Thought Process: disorganized  Associations:  no loose associations  Thought Content:  denies SI and HI; continues to be delusional and observed responding to internal stimuli when on unit, endorses AH, denies VH however appears to be experiencing VH per staff observations  Insight:  limited  Judgement:  limited  Oriented to:  person  Attention Span and Concentration:  limited  Recent and Remote Memory:  limited  Fund of Knowledge:  delayed    Clinical Global Impressions  First:  Considering your total clinical experience with this particular patient population, how severe are the patient's symptoms at this time?: 7 (07/01/21 0630)  Compared to the patient's condition at the START of treatment, this patient's condition is: 4 (07/01/21 0630)  Most recent:  Considering your total clinical experience with this particular patient population, how severe are the patient's symptoms at this time?: 6 (10/12/21 1031)  Compared to the patient's condition at the START of treatment, this " patient's condition is: 3 (10/12/21 1031)         Precautions:     Behavioral Orders   Procedures     Assault precautions     Cheeking Precautions (behavioral units)     Patient Observed swallowing PO medications; Patient asked to drink water after swallowing medication; Patient in Staff line of sight for 15 minutes after medication given; Mouth checks after PO administration (patient asked to open mouth and stick out their tongue).     Code 1     Elopement precautions     Fall precautions     Routine Programming     As clinically indicated     Single Room     Status 15     Every 15 minutes.          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   Stimulant use disorder, in remission.   Transaminitis, possibly medication induced   Hx of CVA  Vit D deficiency  Hypertension  COPD  Hx of infective endocarditis with severe mitral and aortic regurgitation s/p biprostehtic valve replacement 2015  Hx of HFrEF now recovered  Tobacco use disorder  Non-severe malnutrition   Urinary incontinence  Constipation          Assessment & Plan:   Assessment and hospital summary:  The patient is a 56yo male with a history of schizophrenia and TBI and multiple medical co-morbidities as above who was admitted after being transferred from [facility] medical [facility] after a nearly year-long stay. Is currently under commitment with [employer] recently amended to include Clozaril. While at [facility] was noted to have ongoing agitation and frequently attempting to elope from unit requiring 1:1 staffing for safety. He was started on 1:1 staff upon transfer, this was discontinued as patient has been more cooperative without elopement attempts. IM consult completed on admission and continued to follow due to elevated LFTs. Haldol and VPA discontinued due to LFTs. Cross  titration completed from risperidone to clozaril after Yanez amended. EPSE/TD movements noted, have appeared stable over past few weeks. Patient has continued to have disorganization and active psychosis symptoms which appear to be at patients baseline. Team continues to work on disposition which barriers include patient has no guardian or next of kin willing to act as surrogate decision maker, see CTC notes for complete details. Patient starting to have increasing symptoms noted week of 9/20, clozapine level ordered and noted to be lower than previous, have added cheeking precautions and increased clozapine dose on 9/24 and moved to split dosing given increasing afternoon agitation. Pt having decreased appetite, increased fatigue over weekend of 10/9-10/10, IM consult placed 10/11, KUB Xray showed large stool burden, bowel regimen modified. Pt was incontinent of stool evening of 10/13. Consolidated clozapine dose to bedtime given concern for daytime sedation with split dosing.     Psychiatric treatment/inteventions:  Medications:   -continue clozapine 400mg at bedtime; continue to monitor movements consistent with TD, outside of pt reporting feeling tired, no other noted adverse effects from clozapine to indicate toxicity, will monitor closely, if patient continues to appear sedated during day will reduce dose, working to decrease other medications first to prevent psychiatric decompensation   -continue cheeking precautions, started 9/27 given noted decompensation in recent weeks and lower clozapine level   -decrease benztropine to 0.25mg BID, will continue to plan to taper to reduce anticholinergic load  -decrease memantine to 5mg daily for neurocognitive disorder, starting dose reduction/taper off as patient does not appear to have benefit and reporting oversedation and to reduce anticholinergic load  -continue risperdal 1mg Q6H PRN agitation   -continue lorazepam 0.5-1.0mg Q4H PRN anxiety or severe  agitation  -decrease rivastigmine patch to 4.5mg tomorrow as patient has not seemed to have benefit and to reduce anticholinergic load    Laboratory/Imaging: weekly WBC and ANC for clozapine monitoring continues ANC 10/15 was 3.4, clozapine level 9/20 was 389; repeat clozapine level 10/8 774 ; weekly covid negative 10/15; repeat CBC and CMP ordered given patients ongoing fatigue     Patient will be treated in therapeutic milieu with appropriate individual and group therapies as described.     Medical treatment/interventions:  Medical concerns:   1) RN reporting increasing malaise and poor appetite over weekend on 10/11; has continued to appear more withdrawn  -IM consult placed, appreciate assistance, KUB xray ordered along with additional labs, see IM consult note 10/11 and progress notes, agree with assessment and plan as outlined by IM  -pt had episode of fecal incontinence evening of 10/13, bowel regimen continues to be monitored and adjusted per IM, see most recent progress note 10/17 for complete details   -continues to appear more withdrawn 10/19, working to reduce medications, continue to coordinate with IM as indicated, VS stable and pt declining any physical health concerns    2) IM consult completed on admission, per consult note on 7/1/21:  Diagnoses:    #Major neurocognitive disorder dt hx of TBI and alcohol use disorder  #Schizophrenia  #Under commitment  Had been residing in group home prior to admission.  Brought to hospital for evaluation of aggressive behaviors. Group home now unwilling to take him back. Has had numerous CODE 21s called this stay. Seen by psych, meds adjusted. Is currently under civil commitment and court hold through [facility] until 7/31/21. Please see recent discharge summary for details on 6/30/20201.   -Management per psychiatry      #Vitamin D Deficiency- Vit D level 16. Has been in hospital since Sept 2020. Started on cholecalciferol 40989 units on 6/17. Continue  high-dose weekly replacement for 6 weeks total. Following high dose replacement recommend starting vitamin D 2000 international unit(s) daily replacement (start date 8/5/2021).      #Possible Tardive Dyskinesia vs EPSE- Per psych assessment on 4/1/21, noted to have possible tardive dyskinesia vs extrapyramidal side effects of meds.  - At that time, recommended to increase Cogentin to 1mg BID and add vitamin E 800 international unit(s) daily.      #Hx of CVA - hx of basal ganglia stroke in 2015. No focal neuro deficits aside from cognitive dysfunction as above.      #Hyperlipidemia - Continue PTA ASA 81 mg daily and atorvastatin 80 mg daily.      #COPD - Not O2 dependent. Continue PTA on salmeterol and albuterol PRN. Patient occasionally refusing inhalers, encourage compliance. Mucinex added for intermittent cough at [facility].      #Hypothyroidism- TSH 3.18 on 9/2020. Repeat recently on 5/29/2021 slightly elevated 5.18, with no T4 . Continue PTA levothyroxine 88 mcg daily. Repeat TSH with reflex to T4.      #Hx of infective endocarditis with severe mitral and aortic regurgitation S/P bioprosthetic valve replacement (10/2015)  #Hx of HFrEF, now recovered, not in acute exacerbation  Follows with Dr. Dockery of Heart and Vascular Cardiology, last seen in 1/2020. Echocardiogram in 5/2016 with EF 50%, no evidence for prosthetic mitral and aortic valve dysfunction.   - Follow-up with outpatient Cardiology upon dismissal from the hospital (on every 2 year follow-ups).  - Continue ASA 81 mg daily      #Tobacco use disorder- Using nicotine patch and PRN gum.     #Non-severe malnutrition- Nutrition consulted and following at OSH.      Ordered CMP, CBC, and TSH with reflex.  No further recommendations at this time. Please page the on-call SHREE for any intercurrent medical issues which arise.      ADDENDUM: TSH normal. CBC normal. ALT elevated at 174, with unsatisfactory AST. Per chart review, LFTs last checked in December with   and . T bili and alk phos.  Reviewed with pharmacy, and possible medications causing would be Haldol, depakote, and statin. Discussed with psych who will taper haldol and Depakote. Holding statin, will check LDL.  Will repeat LFTs in AM, and check CK. Medicine will follow up on repeat LFTs, and CK.      Edith Alfaro PA-C  Hospitalist Service     3) Per updated progress note by IM 7/4:   A/P:  #Transaminitis - slowly rising LFTs since December. Asymptomatic. Reviewed with pharmacy, and possible medications causing would be Haldol, depakote, and statin. Discussed with psych who will taper haldol and Depakote. Holding statin, (total cholesterol 91, with LDL 41). Repeat LFTs continue to rise slightly.   -Abdominal US tomorrow (NPO after midnight)  -Hepatitis B surface ab and agn and Hep C ab   -Repeat LFTs in 3 days     Medicine will follow up on Abdominal US, viral hepatitis studies and repeat LFTs.  No further recommendations at this time. Please page the on-call SHREE for any intercurrent medical issues which arise.      Edith Alfaro PA-C  Hospitalist Service     Per progress note 7/12:      Brief Medicine Note     Medicine following peripherally for LFT monitoring.  LFTs today improved:  (182) and ALT 92 (129). T bili and ALP WNL.      Today's vital signs, medications, and nursing notes were reviewed.       /77 (BP Location: Left arm)   Pulse 110   Temp 97.8  F (36.6  C) (Oral)   Resp 16   Ht 1.829 m (6')   Wt 87.5 kg (192 lb 14.4 oz)   SpO2 95%   BMI 26.16 kg/m       Continue current plan of care. Will continue to trend CMP In 3 days to ensure continued downtrend.      Kim Harman PA-C  Hospitalist Service        4) Urinary incontinence and stool incontinence: RN staff first reported urinary incontinence over weekend 7/9, per chart review patient has history of intermittent urinary incontinence going back to at least 2016, continues to have intermittent episodes of  "incontinence, continues to have both urinary and stool incontinence, placed IM consult for further assessment of need for additional work up given pts limitations as historian, appreciate assistance, no abnormalities noted on evaluation including bladder scan. Patient declining to wear incontinence briefs most days, staff continuing to encourage and started behavioral plan 9/10 to provide patient with pizza 1x per week for cooperation with incontinence cares. Pt did not wear briefs evening of 9/14, more agreeable 9/15.     5) Unvaccinated for Covid-19, see interim history of note 8/19 by writer, attempted to discuss R/B/A of vaccination with patient, he did not demonstrate capacity, has no next of kin acting as surrogate decision maker, is awaiting guardianship, given patients age and medical co-morbidities the risks of receiving Covid vaccine are outweighed by the benefits, including patient is required to be vaccinated to be considered for discharge to some nursing home facilities which would be a more appropriate milieu and less restrictive environment for patient than locked inpatient psychiatric unit. Dr. Nickerson provided second opinion regarding administration of Covid vaccine on 8.20 and agrees with recommendation.   -Covid vaccine order placed 8/23, J&J vaccine given 8/24    6) On 9/17 Pt reported pain in \"heart and arms\", discussed with IM who evaluated patient given significant cardiac history, EKG completed and patient reported to IM he  was no longer having any pain/discomfort, see IM progress note 9/17 for complete details, agree with assessment and plan as outlined by IM    This note was created by undersigned using a Dragon dictation system. All typing errors or contextual distortion are unintentional and software inherent.     Disposition Plan   Reason for ongoing admission: is unable to care for self due to severe psychosis or mariusz and neurocognitive disorder   Discharge location: TBD, likely " locked NH facility , unable to progress on discharge planning due to guardianship not being in place, see CTC notes  Discharge Medications: not ordered  Follow-up Appointments: not scheduled  Legal Status: Full MI commitment and Yanez     Entered by: Jeanette Dobbins on 10/19/2021 at 7:23 AM

## 2021-10-19 NOTE — PLAN OF CARE
Problem: Behavioral Health Plan of Care  Goal: Plan of Care Review  Recent Flowsheet Documentation  Taken 10/19/2021 0900 by Lynn Singh RN  Plan of Care Reviewed With: patient  Patient Agreement with Plan of Care: unable to participate       Patient remained in his room this shift.  Responds to voice upon approach.  Patient vital signs are WNL.  Patient denies pain.  Patient does not show signs of distress.  Patient mental status is assessed.  Patient has a flat and blunted affect.  Looks at the examiner during assessment.  Speech is garbled with minimal words.  This is patients baseline.  Unable to assess thoughts as patient does not elaborate with speech.  Mood is calm.  Patient reports increased tiredness and is absent of unsafe behaviors.      After lunch, the patient is approached for assessment.  Patient is laying in the bed and his food tray is sitting on the bedside table and has not been touched.  Patient is encouraged to eat. Patient did not respond to the prompt.  Writer handed the patient his sandwich and offered his drink.  Patient took a large bite of his sandwich and drank half of drink.      Nutritionist called to notify, she will be following the patient.  New orders are expected including magic cup with meals and PRN.  Staff is able to call for additional magic cups if needed.  Recommend staff to prompt patient to come to the dining room for meals.  If patient refuse, prompt patient to eat throughout the meal to increase food intake.  Patient remains on Status 15.  Continue with plan of care.

## 2021-10-19 NOTE — PLAN OF CARE
Problem: Behavioral Health Plan of Care  Goal: Plan of Care Review  Outcome: No Change     Pt appears to have slept 7 hours this shift.  Pt noted lightly snoring during rounds, did not appear to be awake.  It was mentioned on previous shift that pt will appear asleep in bed however his eyes remain open, this was not noted this shift.  Pt on elopement, fall, assault & cheeking precautions; single room status.  15 minute checks remain ongoing.  Will continue to monitor and support plan of care.

## 2021-10-19 NOTE — PROGRESS NOTES
"CLINICAL NUTRITION SERVICES - ASSESSMENT NOTE     Nutrition Prescription    RECOMMENDATIONS FOR MDs/PROVIDERS TO ORDER:  None at this time    Malnutrition Status:    Patient does not meet two of the established criteria necessary for diagnosing malnutrition but is at risk for malnutrition    Recommendations already ordered by Registered Dietitian (RD):  Medical food supplement therapy- Magic Cup tid with meals, Ensure Enlive daily between meals--RN may request additional supplements prn    Future/Additional Recommendations:  Monitor intake, weight, supplement acceptance- adjust prn     REASON FOR ASSESSMENT  John Juárez is a/an 55 year old male assessed by the dietitian for Provider Order - \"Pts PO intake at meals poor, fluid intake adequate, wondering about nutritional supplements? Thanks.\"    NUTRITION HISTORY  - Information obtained from chart and RN. Pt is oriented only to self. RN, who is quite familiar with patient, reports pt was eating very well for the majority of his admission until about 2 weeks ago when he began to be very inconsistent with his intake. IM was consulted and GI xray was done and pt found to have high stool burden, which has been resolved with bowel regimen. However, patient's intake remains very inconsistent. RN does note that patient likes desserts.  - NKFA    CURRENT NUTRITION ORDERS  Diet: Regular  Intake/Tolerance: Pt was eating 100% earlier in admission, but has been eating 0-100% for past     LABS  Labs reviewed    MEDICATIONS    aspirin  81 mg Oral Daily     atorvastatin  80 mg Oral At Bedtime     benztropine  0.5 mg Oral BID     cholecalciferol  1,250 mcg Oral Q7 Days     cloZAPine  400 mg Oral At Bedtime     levothyroxine  88 mcg Oral Daily     memantine  5 mg Oral BID     metoprolol tartrate  25 mg Oral BID     polyethylene glycol  17 g Oral Daily     rivastigmine  1 patch Transdermal Daily     rivastigmine   Transdermal Q8H     salmeterol  1 puff Inhalation BID     " "senna-docusate  2 tablet Oral Daily     vitamin D3  50 mcg Oral Daily        ANTHROPOMETRICS  Height: 182.9 cm (6' 0\")  Most Recent Weight: 87.4 kg (192 lb 9.6 oz)    IBW: 80.9 kg  Body mass index is 26.12 kg/m .  BMI: Overweight BMI 25-29.9  Weight History:   Wt Readings from Last 10 Encounters:   10/12/21 87.4 kg (192 lb 9.6 oz)   03/31/21 84.2 kg (185 lb 9.6 oz)   08/26/20 70.3 kg (155 lb)   07/11/20 69.9 kg (154 lb)   06/27/20 70.3 kg (155 lb)   02/04/20 67.6 kg (149 lb)   01/20/20 69.2 kg (152 lb 9 oz)   09/03/18 78 kg (172 lb)     Weights since admission: Noted 2.1% weight loss over 2 weeks  Vitals:    06/30/21 2025 07/04/21 0836 07/15/21 0837 07/22/21 0841   Weight: 89.1 kg (196 lb 8 oz) 87.5 kg (192 lb 14.4 oz) 89.3 kg (196 lb 12.8 oz) 89.2 kg (196 lb 9.6 oz)    08/08/21 0932 08/10/21 0753 08/12/21 0849 08/18/21 0700   Weight: 89.8 kg (197 lb 14.4 oz) 89.9 kg (198 lb 1.6 oz) 89.1 kg (196 lb 8 oz) 89.4 kg (197 lb 1.6 oz)    08/29/21 0800 09/05/21 0853 09/07/21 0835 09/16/21 0816   Weight: 89.5 kg (197 lb 4.8 oz) 90.1 kg (198 lb 9.6 oz) 90 kg (198 lb 6.4 oz) 89.1 kg (196 lb 6.4 oz)    09/23/21 0838 09/28/21 0700 10/12/21 0824   Weight: 89.1 kg (196 lb 8 oz) 89.3 kg (196 lb 14.4 oz) 87.4 kg (192 lb 9.6 oz)     Dosing Weight: 87.4 kg, current weight    ASSESSED NUTRITION NEEDS  Estimated Energy Needs: 8519-8579 kcals/day (25 - 30 kcals/kg)  Justification: Maintenance  Estimated Protein Needs:  grams protein/day (1 - 1.2 grams of pro/kg)  Justification: Maintenance vs repletion  Estimated Fluid Needs: (1 mL/kcal)   Justification: Maintenance    PHYSICAL FINDINGS  See malnutrition section below.    MALNUTRITION  % Intake: < 75% for > 7 days (moderate)  % Weight Loss: Weight loss does not meet criteria  Subcutaneous Fat Loss: Unable to assess  Muscle Loss: Unable to assess  Fluid Accumulation/Edema: None noted  Malnutrition Diagnosis: Patient does not meet two of the established criteria necessary for " diagnosing malnutrition but is at risk for malnutrition    NUTRITION DIAGNOSIS  Inadequate oral intake of unclear etiology (GI, menu fatigue) as evidenced by very inconsistent intake and weight loss      INTERVENTIONS  Implementation  Nutrition Education: Not appropriate at this time due to patient condition   Medical food supplement therapy- Magic Cup tid with meals, Ensure Enlive daily between meals--RN may request additional supplements prn    Goals  Patient to consume % of nutritionally adequate meal trays TID, or the equivalent with supplements/snacks.     Monitoring/Evaluation  Progress toward goals will be monitored and evaluated per protocol.  Najma Suarez RD, LD  ICU/5A/OB/Mental Health Pager (M-F): [phone]  On Call Pager (weekends only): [phone]

## 2021-10-20 LAB
ALBUMIN SERPL-MCNC: 3 G/DL (ref 3.4–5)
ALP SERPL-CCNC: 78 U/L (ref 40–150)
ALT SERPL W P-5'-P-CCNC: 52 U/L (ref 0–70)
ANION GAP SERPL CALCULATED.3IONS-SCNC: 6 MMOL/L (ref 3–14)
AST SERPL W P-5'-P-CCNC: 87 U/L (ref 0–45)
BASOPHILS # BLD AUTO: 0.1 10E3/UL (ref 0–0.2)
BASOPHILS NFR BLD AUTO: 1 %
BILIRUB SERPL-MCNC: 0.7 MG/DL (ref 0.2–1.3)
BUN SERPL-MCNC: 17 MG/DL (ref 7–30)
CALCIUM SERPL-MCNC: 8.7 MG/DL (ref 8.5–10.1)
CHLORIDE BLD-SCNC: 110 MMOL/L (ref 94–109)
CO2 SERPL-SCNC: 23 MMOL/L (ref 20–32)
CREAT SERPL-MCNC: 0.88 MG/DL (ref 0.66–1.25)
EOSINOPHIL # BLD AUTO: 1.2 10E3/UL (ref 0–0.7)
EOSINOPHIL NFR BLD AUTO: 20 %
ERYTHROCYTE [DISTWIDTH] IN BLOOD BY AUTOMATED COUNT: 12.1 % (ref 10–15)
GFR SERPL CREATININE-BSD FRML MDRD: >90 ML/MIN/1.73M2
GLUCOSE BLD-MCNC: 94 MG/DL (ref 70–99)
HCT VFR BLD AUTO: 45.6 % (ref 40–53)
HGB BLD-MCNC: 15.9 G/DL (ref 13.3–17.7)
IMM GRANULOCYTES # BLD: 0 10E3/UL
IMM GRANULOCYTES NFR BLD: 0 %
LYMPHOCYTES # BLD AUTO: 1 10E3/UL (ref 0.8–5.3)
LYMPHOCYTES NFR BLD AUTO: 16 %
MCH RBC QN AUTO: 31.3 PG (ref 26.5–33)
MCHC RBC AUTO-ENTMCNC: 34.9 G/DL (ref 31.5–36.5)
MCV RBC AUTO: 90 FL (ref 78–100)
MONOCYTES # BLD AUTO: 0.5 10E3/UL (ref 0–1.3)
MONOCYTES NFR BLD AUTO: 8 %
NEUTROPHILS # BLD AUTO: 3.3 10E3/UL (ref 1.6–8.3)
NEUTROPHILS NFR BLD AUTO: 55 %
NRBC # BLD AUTO: 0 10E3/UL
NRBC BLD AUTO-RTO: 0 /100
PLATELET # BLD AUTO: 231 10E3/UL (ref 150–450)
POTASSIUM BLD-SCNC: 4 MMOL/L (ref 3.4–5.3)
PROT SERPL-MCNC: 7.7 G/DL (ref 6.8–8.8)
RBC # BLD AUTO: 5.08 10E6/UL (ref 4.4–5.9)
SODIUM SERPL-SCNC: 139 MMOL/L (ref 133–144)
WBC # BLD AUTO: 6 10E3/UL (ref 4–11)

## 2021-10-20 PROCEDURE — 250N000013 HC RX MED GY IP 250 OP 250 PS 637: Performed by: PSYCHIATRY & NEUROLOGY

## 2021-10-20 PROCEDURE — 124N000002 HC R&B MH UMMC

## 2021-10-20 PROCEDURE — 250N000013 HC RX MED GY IP 250 OP 250 PS 637: Performed by: PHYSICIAN ASSISTANT

## 2021-10-20 PROCEDURE — 85025 COMPLETE CBC W/AUTO DIFF WBC: CPT | Performed by: PSYCHIATRY & NEUROLOGY

## 2021-10-20 PROCEDURE — 80053 COMPREHEN METABOLIC PANEL: CPT | Performed by: PSYCHIATRY & NEUROLOGY

## 2021-10-20 PROCEDURE — 36415 COLL VENOUS BLD VENIPUNCTURE: CPT | Performed by: PSYCHIATRY & NEUROLOGY

## 2021-10-20 RX ADMIN — MEMANTINE 5 MG: 5 TABLET ORAL at 20:15

## 2021-10-20 RX ADMIN — DOCUSATE SODIUM AND SENNOSIDES 2 TABLET: 8.6; 5 TABLET ORAL at 08:16

## 2021-10-20 RX ADMIN — LEVOTHYROXINE SODIUM 88 MCG: 88 TABLET ORAL at 08:00

## 2021-10-20 RX ADMIN — Medication 0.25 MG: at 20:15

## 2021-10-20 RX ADMIN — RIVASTIGMINE 1 PATCH: 4.6 PATCH TRANSDERMAL at 08:16

## 2021-10-20 RX ADMIN — SALMETEROL XINAFOATE 1 PUFF: 50 POWDER, METERED ORAL; RESPIRATORY (INHALATION) at 08:19

## 2021-10-20 RX ADMIN — POLYETHYLENE GLYCOL 3350 17 G: 17 POWDER, FOR SOLUTION ORAL at 08:15

## 2021-10-20 RX ADMIN — ASPIRIN 81 MG CHEWABLE TABLET 81 MG: 81 TABLET CHEWABLE at 08:16

## 2021-10-20 RX ADMIN — MEMANTINE 5 MG: 5 TABLET ORAL at 08:16

## 2021-10-20 RX ADMIN — SALMETEROL XINAFOATE 1 PUFF: 50 POWDER, METERED ORAL; RESPIRATORY (INHALATION) at 20:15

## 2021-10-20 RX ADMIN — METOPROLOL TARTRATE 25 MG: 25 TABLET, FILM COATED ORAL at 20:15

## 2021-10-20 RX ADMIN — Medication 0.25 MG: at 08:15

## 2021-10-20 RX ADMIN — METOPROLOL TARTRATE 25 MG: 25 TABLET, FILM COATED ORAL at 08:16

## 2021-10-20 RX ADMIN — CLOZAPINE 400 MG: 100 TABLET ORAL at 20:15

## 2021-10-20 RX ADMIN — ACETAMINOPHEN 650 MG: 325 TABLET, FILM COATED ORAL at 20:39

## 2021-10-20 RX ADMIN — Medication 50 MCG: at 08:16

## 2021-10-20 RX ADMIN — ATORVASTATIN CALCIUM 80 MG: 80 TABLET, FILM COATED ORAL at 20:15

## 2021-10-20 ASSESSMENT — ACTIVITIES OF DAILY LIVING (ADL)
LAUNDRY: UNABLE TO COMPLETE
ORAL_HYGIENE: INDEPENDENT
LAUNDRY: WITH SUPERVISION
DRESS: INDEPENDENT
DRESS: PROMPTS;INDEPENDENT
ORAL_HYGIENE: PROMPTS;INDEPENDENT
HYGIENE/GROOMING: INDEPENDENT
HYGIENE/GROOMING: PROMPTS;INDEPENDENT

## 2021-10-20 NOTE — PLAN OF CARE
Pt appeared to be a sleep @ all safety checks.  He slept for 7 hours.  No medications administered this shift.

## 2021-10-20 NOTE — PLAN OF CARE
"Problem: Malnutrition  Goal: Improved Nutritional Intake  Outcome: No Change     John remains isolative and behaviorally in control. He spent the entire shift in bed. He did not come out of his room. Pt states, \"I'm tired.\" He denies SI/HI/VH. Pt endorses hearing voices, per baseline, that do not bother him. Pt has very mild baseline lingual TD movements that Writer noticed when he is complying with cheeking precautions. He does not notice them though.     Pt reports no BM this shift. He ate 50% of dinner. Drank multiple juices, Gatorades, and glasses of water. Fluid intake remains good. He did not eat his Magic Cup ice cream, states \"I already ate dinner so I'm sleeping now.\" Denies pain or discomfort.     Pt accepts HS med, complies with cheeking precautions, and goes to sleep.  "

## 2021-10-20 NOTE — PLAN OF CARE
Problem: Behavioral Health Plan of Care  Goal: Develops/Participates in [facility] [place] to Support Successful Transition  Intervention: Foster [facility] [place]  Recent Flowsheet Documentation  Taken 10/20/2021 9102 by Tess Vaca RN  Trust Relationship/Rapport:   care explained   reassurance provided   thoughts/feelings acknowledged  In bed all am, refuses to get out of bed.  This staff has gone down to his room through out the morning trying to encourage nutrition. He keeps refuses his breakfast  And became upset at this writer for bugging him. He is refusing shower but will continue to encourage this afternoon. Some confusion, appears responding, poor eye contact, blunted affect.

## 2021-10-20 NOTE — PLAN OF CARE
Patient presents with a blunted affect. Mood presents as calm. Is cooperative. Speech is mumbled. Eye contact is poor. Alert x1, self only. Patient denies depression, anxiety, suicidal ideation, SIB, and homicidal ideation. Patient denies auditory/visual hallucinations, yet appears to be responding to internal stimuli; as evidenced by, staring into space and lips moving as if talking to imaginary person or persons. No medical issues noted. Medication compliant. No medication cheeking noted. Did not eat evening meal. Patient spent entire shift in his room in bed. No interaction with staff or peers noted.     /78 (BP Location: Left arm)   Pulse 102   Temp (!) 101.2  F (38.4  C) (Tympanic)   Resp 16   Ht 1.829 m (6')   Wt 87.4 kg (192 lb 9.6 oz)   SpO2 95%   BMI 26.12 kg/m      This shift patient's temp and HR were elevated (see flowsheet for details). Did not eat evening meal. In bed entire shift stating that he is tired. Last COVID test on 10/15 and was negative. PRN Tylenol x1 given for elevated temperature. On-call physician paged and updated. New order for Internal Medicine Consult for tomorrow morning Thursday 10/21 received. Temperature trending down and currently 100.1 at 2202. Will continue to monitor..

## 2021-10-20 NOTE — PLAN OF CARE
Assessment/Intervention/Current Symtoms and Care Coordination  -Chart review  -Pre round meeting with team  -Rounded with team, addressed patient needs/concerns  -Post round meeting with team  Current Symptoms include the following: Psychosis, disorganization and confusion.        Discharge Plan or Goal  Considerations include: locked behavorial unit in nursing home     Barriers to Discharge  The pt does not have a guardian and a guardian has not been found for this pt. Nursing homes are requiring a guardian.     Referral Status  locked behavioral unit in nursing home  no new outside referrals were made today      Legal Status  Patient is under MI commitment in [facility]

## 2021-10-21 LAB
ALBUMIN SERPL-MCNC: 3.1 G/DL (ref 3.4–5)
ALP SERPL-CCNC: 75 U/L (ref 40–150)
ALT SERPL W P-5'-P-CCNC: 54 U/L (ref 0–70)
ANION GAP SERPL CALCULATED.3IONS-SCNC: 5 MMOL/L (ref 3–14)
AST SERPL W P-5'-P-CCNC: 99 U/L (ref 0–45)
BILIRUB SERPL-MCNC: 0.7 MG/DL (ref 0.2–1.3)
BUN SERPL-MCNC: 20 MG/DL (ref 7–30)
CALCIUM SERPL-MCNC: 9 MG/DL (ref 8.5–10.1)
CHLORIDE BLD-SCNC: 109 MMOL/L (ref 94–109)
CK SERPL-CCNC: 159 U/L (ref 30–300)
CO2 SERPL-SCNC: 24 MMOL/L (ref 20–32)
CREAT SERPL-MCNC: 0.89 MG/DL (ref 0.66–1.25)
ERYTHROCYTE [DISTWIDTH] IN BLOOD BY AUTOMATED COUNT: 12 % (ref 10–15)
FLUAV AG SPEC QL IA: NEGATIVE
FLUBV AG SPEC QL IA: NEGATIVE
GFR SERPL CREATININE-BSD FRML MDRD: >90 ML/MIN/1.73M2
GLUCOSE BLD-MCNC: 104 MG/DL (ref 70–99)
HCT VFR BLD AUTO: 45.4 % (ref 40–53)
HGB BLD-MCNC: 16.1 G/DL (ref 13.3–17.7)
MCH RBC QN AUTO: 31.6 PG (ref 26.5–33)
MCHC RBC AUTO-ENTMCNC: 35.5 G/DL (ref 31.5–36.5)
MCV RBC AUTO: 89 FL (ref 78–100)
PLATELET # BLD AUTO: 230 10E3/UL (ref 150–450)
POTASSIUM BLD-SCNC: 4.1 MMOL/L (ref 3.4–5.3)
PROT SERPL-MCNC: 7.9 G/DL (ref 6.8–8.8)
RBC # BLD AUTO: 5.09 10E6/UL (ref 4.4–5.9)
SARS-COV-2 RNA RESP QL NAA+PROBE: NEGATIVE
SODIUM SERPL-SCNC: 138 MMOL/L (ref 133–144)
TROPONIN I SERPL-MCNC: <0.015 UG/L (ref 0–0.04)
WBC # BLD AUTO: 6.2 10E3/UL (ref 4–11)

## 2021-10-21 PROCEDURE — 84484 ASSAY OF TROPONIN QUANT: CPT | Performed by: PHYSICIAN ASSISTANT

## 2021-10-21 PROCEDURE — 87804 INFLUENZA ASSAY W/OPTIC: CPT | Performed by: PHYSICIAN ASSISTANT

## 2021-10-21 PROCEDURE — 250N000013 HC RX MED GY IP 250 OP 250 PS 637: Performed by: PSYCHIATRY & NEUROLOGY

## 2021-10-21 PROCEDURE — 36415 COLL VENOUS BLD VENIPUNCTURE: CPT | Performed by: PHYSICIAN ASSISTANT

## 2021-10-21 PROCEDURE — 80053 COMPREHEN METABOLIC PANEL: CPT | Performed by: PHYSICIAN ASSISTANT

## 2021-10-21 PROCEDURE — 250N000013 HC RX MED GY IP 250 OP 250 PS 637: Performed by: PHYSICIAN ASSISTANT

## 2021-10-21 PROCEDURE — 93010 ELECTROCARDIOGRAM REPORT: CPT | Performed by: INTERNAL MEDICINE

## 2021-10-21 PROCEDURE — 82040 ASSAY OF SERUM ALBUMIN: CPT | Performed by: PHYSICIAN ASSISTANT

## 2021-10-21 PROCEDURE — 93005 ELECTROCARDIOGRAM TRACING: CPT

## 2021-10-21 PROCEDURE — U0005 INFEC AGEN DETEC AMPLI PROBE: HCPCS | Performed by: PHYSICIAN ASSISTANT

## 2021-10-21 PROCEDURE — 82550 ASSAY OF CK (CPK): CPT | Performed by: PHYSICIAN ASSISTANT

## 2021-10-21 PROCEDURE — 99232 SBSQ HOSP IP/OBS MODERATE 35: CPT | Performed by: PHYSICIAN ASSISTANT

## 2021-10-21 PROCEDURE — 85014 HEMATOCRIT: CPT | Performed by: PHYSICIAN ASSISTANT

## 2021-10-21 PROCEDURE — 124N000002 HC R&B MH UMMC

## 2021-10-21 PROCEDURE — 99233 SBSQ HOSP IP/OBS HIGH 50: CPT | Performed by: PSYCHIATRY & NEUROLOGY

## 2021-10-21 RX ADMIN — SALMETEROL XINAFOATE 1 PUFF: 50 POWDER, METERED ORAL; RESPIRATORY (INHALATION) at 21:23

## 2021-10-21 RX ADMIN — ACETAMINOPHEN 650 MG: 325 TABLET, FILM COATED ORAL at 21:22

## 2021-10-21 RX ADMIN — POLYETHYLENE GLYCOL 3350 17 G: 17 POWDER, FOR SOLUTION ORAL at 07:53

## 2021-10-21 RX ADMIN — CLOZAPINE 375 MG: 100 TABLET ORAL at 21:22

## 2021-10-21 RX ADMIN — ATORVASTATIN CALCIUM 80 MG: 80 TABLET, FILM COATED ORAL at 21:21

## 2021-10-21 RX ADMIN — DOCUSATE SODIUM AND SENNOSIDES 2 TABLET: 8.6; 5 TABLET ORAL at 07:53

## 2021-10-21 RX ADMIN — Medication 50 MCG: at 07:53

## 2021-10-21 RX ADMIN — MEMANTINE 5 MG: 5 TABLET ORAL at 21:22

## 2021-10-21 RX ADMIN — CHOLECALCIFEROL CAP 1.25 MG (50000 UNIT) 1250 MCG: 1.25 CAP at 10:03

## 2021-10-21 RX ADMIN — MEMANTINE 5 MG: 5 TABLET ORAL at 10:04

## 2021-10-21 RX ADMIN — METOPROLOL TARTRATE 25 MG: 25 TABLET, FILM COATED ORAL at 21:21

## 2021-10-21 RX ADMIN — LEVOTHYROXINE SODIUM 88 MCG: 88 TABLET ORAL at 07:53

## 2021-10-21 RX ADMIN — SALMETEROL XINAFOATE 1 PUFF: 50 POWDER, METERED ORAL; RESPIRATORY (INHALATION) at 10:02

## 2021-10-21 RX ADMIN — ASPIRIN 81 MG CHEWABLE TABLET 81 MG: 81 TABLET CHEWABLE at 07:53

## 2021-10-21 ASSESSMENT — ACTIVITIES OF DAILY LIVING (ADL)
LAUNDRY: UNABLE TO COMPLETE
ORAL_HYGIENE: INDEPENDENT
ORAL_HYGIENE: PROMPTS;INDEPENDENT
DRESS: PROMPTS;INDEPENDENT
DRESS: INDEPENDENT
LAUNDRY: UNABLE TO COMPLETE
HYGIENE/GROOMING: INDEPENDENT
HYGIENE/GROOMING: PROMPTS;INDEPENDENT

## 2021-10-21 NOTE — PROGRESS NOTES
"[facility], [place]   Psychiatric Progress Note  Hospital Day: 113        Interim History:   The patient's care was discussed with the treatment team during the daily team meeting and/or staff's chart notes were reviewed.  Staff report patient continues to isolate to room, needs encouragement for fluid and food intake, taking medications as prescribed, had temp of 101.2, IM consult placed overnight, no acute events overnight.     Upon interview pt was sleeping with eyes closed in bed, opened eyes to voice, reports he is \"still tired\", mood is \"fine\", denies VH, reports AH that are unchanged and still interfere with sleep, has been sleeping more. He is not able to explain why he is more tired, reports \"maybe from my heart surgery\", reviewed he had this awhile ago and shouldn't be impacting energy now. Breakfast was at bedside untouched, encouraged to eat. He denies having any pain or discomfort. Tolerating medications, reviewed doses are being decreased to help improve fatigue. No additional concerns.          Medications:       aspirin  81 mg Oral Daily     atorvastatin  80 mg Oral At Bedtime     benztropine  0.25 mg Oral BID     cholecalciferol  1,250 mcg Oral Q7 Days     cloZAPine  400 mg Oral At Bedtime     levothyroxine  88 mcg Oral Daily     memantine  5 mg Oral BID     metoprolol tartrate  25 mg Oral BID     polyethylene glycol  17 g Oral Daily     rivastigmine  1 patch Transdermal Daily     rivastigmine   Transdermal Q8H     salmeterol  1 puff Inhalation BID     senna-docusate  2 tablet Oral Daily     vitamin D3  50 mcg Oral Daily          Allergies:     Allergies   Allergen Reactions     Ibuprofen      Latex           Labs:     Recent Results (from the past 48 hour(s))   Comprehensive metabolic panel    Collection Time: 10/20/21  8:00 AM   Result Value Ref Range    Sodium 139 133 - 144 mmol/L    Potassium 4.0 3.4 - 5.3 mmol/L    Chloride 110 (H) 94 - 109 mmol/L    Carbon Dioxide " "(CO2) 23 20 - 32 mmol/L    Anion Gap 6 3 - 14 mmol/L    Urea Nitrogen 17 7 - 30 mg/dL    Creatinine 0.88 0.66 - 1.25 mg/dL    Calcium 8.7 8.5 - 10.1 mg/dL    Glucose 94 70 - 99 mg/dL    Alkaline Phosphatase 78 40 - 150 U/L    AST 87 (H) 0 - 45 U/L    ALT 52 0 - 70 U/L    Protein Total 7.7 6.8 - 8.8 g/dL    Albumin 3.0 (L) 3.4 - 5.0 g/dL    Bilirubin Total 0.7 0.2 - 1.3 mg/dL    GFR Estimate >90 >60 mL/min/1.73m2   CBC with platelets and differential    Collection Time: 10/20/21  8:00 AM   Result Value Ref Range    WBC Count 6.0 4.0 - 11.0 10e3/uL    RBC Count 5.08 4.40 - 5.90 10e6/uL    Hemoglobin 15.9 13.3 - 17.7 g/dL    Hematocrit 45.6 40.0 - 53.0 %    MCV 90 78 - 100 fL    MCH 31.3 26.5 - 33.0 pg    MCHC 34.9 31.5 - 36.5 g/dL    RDW 12.1 10.0 - 15.0 %    Platelet Count 231 150 - 450 10e3/uL    % Neutrophils 55 %    % Lymphocytes 16 %    % Monocytes 8 %    % Eosinophils 20 %    % Basophils 1 %    % Immature Granulocytes 0 %    NRBCs per 100 WBC 0 <1 /100    Absolute Neutrophils 3.3 1.6 - 8.3 10e3/uL    Absolute Lymphocytes 1.0 0.8 - 5.3 10e3/uL    Absolute Monocytes 0.5 0.0 - 1.3 10e3/uL    Absolute Eosinophils 1.2 (H) 0.0 - 0.7 10e3/uL    Absolute Basophils 0.1 0.0 - 0.2 10e3/uL    Absolute Immature Granulocytes 0.0 <=0.0 10e3/uL    Absolute NRBCs 0.0 10e3/uL          Psychiatric Examination:     BP 91/66 (BP Location: Right arm)   Pulse 88   Temp 97.9  F (36.6  C) (Tympanic)   Resp 18   Ht 1.829 m (6')   Wt 87.4 kg (192 lb 9.6 oz)   SpO2 96%   BMI 26.12 kg/m    Weight is 192 lbs 9.6 oz  Body mass index is 26.12 kg/m .    Orthostatic Vitals       Most Recent      Lying Orthostatic /65 10/21 1218    Lying Orthostatic Pulse (bpm) 95 10/21 1218        Appearance: awake, alert and poor hygiene   Attitude: minimally cooperative  Eye Contact:  fair, would close eyes but when open looking at writer  Mood:  \"fine\" continues to have periods of irritability   Affect:  mood congruent and intensity is " flat  Speech:  soft volume, raspy ,dysarthric   Language: fluent and intact in English  Psychomotor, Gait, Musculoskeletal:  no evidence of dystonia, or tics, continues to have movements of buccolingual dyskinesia present at times, minimal TD noted today on interview   Thought Process: disorganized  Associations:  no loose associations  Thought Content:  denies SI and HI; continues to be delusional and observed responding to internal stimuli when on unit, endorses AH, denies VH however appears to be experiencing VH per staff observations  Insight:  limited  Judgement:  limited  Oriented to:  person  Attention Span and Concentration:  limited  Recent and Remote Memory:  limited  Fund of Knowledge:  delayed    Clinical Global Impressions  First:  Considering your total clinical experience with this particular patient population, how severe are the patient's symptoms at this time?: 7 (07/01/21 0630)  Compared to the patient's condition at the START of treatment, this patient's condition is: 4 (07/01/21 0630)  Most recent:  Considering your total clinical experience with this particular patient population, how severe are the patient's symptoms at this time?: 6 (10/12/21 1031)  Compared to the patient's condition at the START of treatment, this patient's condition is: 3 (10/12/21 1031)         Precautions:     Behavioral Orders   Procedures     Assault precautions     Cheeking Precautions (behavioral units)     Patient Observed swallowing PO medications; Patient asked to drink water after swallowing medication; Patient in Staff line of sight for 15 minutes after medication given; Mouth checks after PO administration (patient asked to open mouth and stick out their tongue).     Code 1     Elopement precautions     Fall precautions     Routine Programming     As clinically indicated     Single Room     Status 15     Every 15 minutes.          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   Stimulant use disorder, in remission.   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 Is currently under commitment with [facility] recently amended to include Clozaril. While at [facility] was noted to have ongoing agitation and frequently attempting to elope from unit requiring 1:1 staffing for safety. He was started on 1:1 staff upon transfer, this was discontinued as patient has been more cooperative without elopement attempts. IM consult completed on admission and continued to follow due to elevated LFTs. Haldol and VPA discontinued due to LFTs. Cross titration completed from risperidone to clozaril after [facility] amended. EPSE/TD movements noted, have appeared stable over past few weeks. Patient has continued to have disorganization and active psychosis symptoms which appear to be at patients baseline. Team continues to work on disposition which barriers include patient has no guardian or next of kin willing to act as surrogate decision maker, see [facility] notes for complete details. Patient starting to have increasing symptoms noted week of 9/20, clozapine level ordered and noted to be lower than previous, have added cheeking precautions and increased clozapine dose on 9/24 and moved to split dosing given increasing afternoon  agitation. Pt having decreased appetite, increased fatigue over weekend of 10/9-10/10, IM consult placed 10/11, KUB Xray showed large stool burden, bowel regimen modified. Pt was incontinent of stool evening of 10/13. Consolidated clozapine dose to bedtime given concern for daytime sedation with split dosing. Ongoing malaise noted starting 10/11, internal medicine has been contacted periodically.     Psychiatric treatment/inteventions:  Medications:   -reduce clozapine to 375mg at bedtime due to ongoing sedation/fatigue; continue to monitor movements consistent with TD, outside of pt reporting feeling tired, no other noted adverse effects from clozapine to indicate toxicity, will monitor closely, if patient continues to appear sedated during day will reduce dose further, working to decrease other medications as well  -continue cheeking precautions, started 9/27 given noted decompensation in recent weeks and lower clozapine level   -discontinue benztropine 0.25mg BID, reducing anticholinergic load  -decrease memantine to 5mg daily for neurocognitive disorder, starting dose reduction/taper off as patient does not appear to have benefit and reporting oversedation and to reduce anticholinergic load  -continue risperdal 1mg Q6H PRN agitation   -continue lorazepam 0.5-1.0mg Q4H PRN anxiety or severe agitation  -discontinue rivastigmine patch to 4.5mg as patient has not seemed to have benefit and to reduce anticholinergic load    Laboratory/Imaging: weekly WBC and ANC for clozapine monitoring continues ANC 10/15 was 3.4, clozapine level 9/20 was 389; repeat clozapine level 10/8 774 ; weekly covid negative 10/15; additional lab work ordered by IM today as below      Patient will be treated in therapeutic milieu with appropriate individual and group therapies as described.     Medical treatment/interventions:  Medical concerns:   IM re-consulted due to fever and ongoing malaise 10/21, per progress note:   Assessment and  plan:  John Juárez is a 55 year old male admitted on 6/30/2021. He has a history of schizophrenia, TBI, alcohol use disorder, COPD, HLD, cardiomyopathy (now resolved), endocarditis s/p bioprosthetic valve replacement (2015), CVA, and hypothyroidism. Please see initial IM consult from 7/1/21 for details regarding chronic medical conditions. Internal medicine asked to reassess patient due to fever overnight      Transient fever: Febrile to 101.2 overnight, currently afebrile and VSS. Patient denies s/s infection. No muscle pains or myalgias, or difficulty moving joints. Per nursing, no acute mental status changes. Writer unable to obtain further information or complete full physical exam due to verbal abuse as below. Etiology viral illness vs Clozaril induced vs NMS vs other  - Check COVID and influenza  - CBC, CMP, CK, trop  - EKG  - Contact medicine if recurrent fever >100.4 or new clinical changes   ** Addendum: COVID and Flu negative. CBC, CMP, CK, and trop unremarkable. EKG NSR with RBBB. Of note, QTc 472, so would advise caution with use of prolonging meds. Patient remains afebrile  - Continue close monitoring in the setting Clozapine use. Medicine will sign off. please contact with future questions or concerns     This note was created by undersigned using a Dragon dictation system. All typing errors or contextual distortion are unintentional and software inherent.     Disposition Plan   Reason for ongoing admission: is unable to care for self due to severe psychosis or mariusz and neurocognitive disorder   Discharge location: [facility], likely locked NH facility , unable to progress on discharge planning due to guardianship not being in place, see [facility] notes  Discharge Medications: not ordered  Follow-up Appointments: not scheduled  Legal Status: Full MI commitment and [facility]     Entered by: Jeanette Dobbins on 10/21/2021 at 6:31 AM

## 2021-10-21 NOTE — PLAN OF CARE
Continues to feel fatigued, ' Im so tired' Laying in bed little to say to this staff. He declined breakfast , he did take his medication and was cooperative with nasal swab today . He is refusing cares at this time, says he is not wet, and maybe he would take a shower later. Blunted sad affect, mood calm except when frustrated with staff for checking on him so much. No temp this am 97.9, 114/78,95  Will continue to offer support .

## 2021-10-21 NOTE — PLAN OF CARE
Assessment/Intervention/Current Symtoms and Care Coordination  -Chart review  -Pre round meeting with team  -Rounded with team, addressed patient needs/concerns  -Post round meeting with team  Current Symptoms include the following: Psychosis, disorganization and confusion.        Discharge Plan or Goal  Considerations include: locked behavorial unit in nursing home     Barriers to Discharge  The pt does not have a guardian and a guardian has not been found for this pt. Nursing homes are requiring a guardian.     Referral Status  locked behavioral unit in nursing home  no new outside referrals were made today      Legal Status  Patient is under MI commitment in [facility]

## 2021-10-21 NOTE — PLAN OF CARE
Patient presents with a blunted affect. Mood presents as calm. Is cooperative. Speech is mumbled. Eye contact is poor. Alert x1, self only. Patient denies depression, anxiety, suicidal ideation, SIB, and homicidal ideation. Patient denies auditory/visual hallucinations, yet appears to be responding to internal stimuli; as evidenced by, staring into space and lips moving as if talking to imaginary person or persons. Medication compliant. No medication cheeking noted. Did not eat evening meal. Patient spent entire shift in his room in bed. No interaction with staff or peers noted.     /68   Pulse 97   Temp 98.9  F (37.2  C) (Oral)   Resp 18   Ht 1.829 m (6')   Wt 87.4 kg (192 lb 9.6 oz)   SpO2 98%   BMI 26.12 kg/m       This shift patient's temp and HR were elevated again this shift (see flowsheet for details). Did not eat evening meal. In bed entire shift stating that he is tired. Was seen by Internal Medicine on day sift today Thursday 10/21. Labs were drawn and WNL. COVID test and Influenza A and B labs were completed. All were negative. On 10/21 and was negative. PRN Tylenol x1 given for elevated temperature. On-call physician paged and updated. New order for Internal Medicine Consult for tomorrow morning Friday 10/22 received. Temperature trending down and currently 100.1 at 2202. Will continue to monitor.

## 2021-10-21 NOTE — PLAN OF CARE
"Pt appeared to sleep approximately 6.5 hours tonight.  Awake at 6 am and cooperated with VS assessment.  T=97.9 tympanic, P 88, BP 91/66.  Denies aches and pain at this time.  Pt asked \"When can I go home?\".  Staff encouraged Pt to drink more fluids and to do more activity as he had been resting in bed much recently.  When asked if he is wearing an incontinence brief he replied \"Yea.\"  Pt remains resting in bed with drinks and snacks at bedside table.    "

## 2021-10-21 NOTE — CONSULTS
"Internal Medicine Progress Note      Assessment and plan:  John Juárez is a 55 year old male admitted on 6/30/2021. He has a history of schizophrenia, TBI, alcohol use disorder, COPD, HLD, cardiomyopathy (now resolved), endocarditis s/p bioprosthetic valve replacement (2015), CVA, and hypothyroidism. Please see initial IM consult from 7/1/21 for details regarding chronic medical conditions. Internal medicine asked to reassess patient due to fever overnight     Transient fever: Febrile to 101.2 overnight, currently afebrile and VSS. Patient denies s/s infection. No muscle pains or myalgias, or difficulty moving joints. Per nursing, no acute mental status changes. Writer unable to obtain further information or complete full physical exam due to verbal abuse as below. Etiology viral illness vs Clozaril induced vs NMS vs other  - Check COVID and influenza  - CBC, CMP, CK, trop  - EKG  - Contact medicine if recurrent fever >100.4 or new clinical changes   ** Addendum: COVID and Flu negative. CBC, CMP, CK, and trop unremarkable. EKG NSR with RBBB. Of note, QTc 472, so would advise caution with use of prolonging meds. Patient remains afebrile  - Continue close monitoring in the setting Clozapine use. Medicine will sign off. please contact with future questions or concerns         Objective:  BP 91/66 (BP Location: Right arm)   Pulse 88   Temp 97.9  F (36.6  C) (Tympanic)   Resp 18   Ht 1.829 m (6')   Wt 87.4 kg (192 lb 9.6 oz)   SpO2 96%   BMI 26.12 kg/m      Vitals signs reviewed and noted. Writer unable to complete full physical exam as patient states, \"Don't you god damn fucking touch me!\"    GENERAL: Alert and oriented x3, disheveled   HEENT: Anicteric sclera. MMM. Unable to examine oropharynx due to verbal abuse as above  CV: Refused   RESPIRATORY: Effort normal on room air. Refused ascultation  GI: Refused  NEUROLOGICAL: No focal deficits. Moves all extremities. Unable to complete full neuro " "exam  EXTREMITIES: No peripheral edema on inspection, refused full exam  SKIN: No jaundice, no rash    Pertinent labs and procedures were reviewed     Subjective:   Patient reports feeling tired. He is hungry but does not want his food at bedside. Denies AMS. Denies chest pain, dyspnea, fever, chills, sore throat. Intermittently does not answer writer. When writer touches patient's foot to ensure patient is aware of writer, patient states, \"Don't you goddamn fucking touch me.\" At this time interview and exam terminated for safety of writer and patient preference     Twyla Kumar PA-C  Internal Medicine  [phone]      "

## 2021-10-21 NOTE — PLAN OF CARE
BEHAVIORAL TEAM DISCUSSION    Participants:   Dr. Dobbins, Melissa WEN, Kim Vaca RN, Coral Mckeon OT      Progress:   Pt has been here 113 days.  Pt is being treated for chronic mental health symptoms with Clozaril.  Pt continues to show symptoms of responding to internal stimuli.  Pt is reporting being tired.  There are concerns about the pt not eating and not coming out of his room.  Medications are being adjusted.     Pt can not be accepted into a nursing home as he does not have a guardian.    Anticipated length of stay:   3 months    Continued Stay Criteria/Rationale:   The pt peñaloza snot have a structured place that he can be discharged to.    Medical/Physical:   Pt is being evaluated by inteal medicine for concerns about his physical health.  Transient fever  COVID and Flu negative. CBC, CMP, CK, and trop unremarkable. EKG NSR with RBBB. Of note, QTc 472, so would advise caution with use of prolonging meds. Patient remains afebrile      Precautions:   Behavioral Orders   Procedures     Assault precautions     Cheeking Precautions (behavioral units)     Patient Observed swallowing PO medications; Patient asked to drink water after swallowing medication; Patient in Staff line of sight for 15 minutes after medication given; Mouth checks after PO administration (patient asked to open mouth and stick out their tongue).     Code 1     Elopement precautions     Fall precautions     Routine Programming     As clinically indicated     Single Room     Status 15     Every 15 minutes.     Plan:   Contact precautions  Adjust medications that may be causing sedation  Continue to look for guardian.    Rationale for change in precautions or plan: no changes

## 2021-10-22 LAB
ALBUMIN UR-MCNC: 50 MG/DL
APPEARANCE UR: CLEAR
BASOPHILS # BLD AUTO: 0.1 10E3/UL (ref 0–0.2)
BASOPHILS NFR BLD AUTO: 1 %
BILIRUB UR QL STRIP: NEGATIVE
C PNEUM DNA SPEC QL NAA+PROBE: NOT DETECTED
COLOR UR AUTO: YELLOW
CRP SERPL-MCNC: 30 MG/L (ref 0–8)
EOSINOPHIL # BLD AUTO: 1.4 10E3/UL (ref 0–0.7)
EOSINOPHIL NFR BLD AUTO: 18 %
ERYTHROCYTE [DISTWIDTH] IN BLOOD BY AUTOMATED COUNT: 12 % (ref 10–15)
ERYTHROCYTE [SEDIMENTATION RATE] IN BLOOD BY WESTERGREN METHOD: 37 MM/HR (ref 0–20)
FLUAV H1 2009 PAND RNA SPEC QL NAA+PROBE: NOT DETECTED
FLUAV H1 RNA SPEC QL NAA+PROBE: NOT DETECTED
FLUAV H3 RNA SPEC QL NAA+PROBE: NOT DETECTED
FLUAV RNA SPEC QL NAA+PROBE: NOT DETECTED
FLUBV RNA SPEC QL NAA+PROBE: NOT DETECTED
GLUCOSE UR STRIP-MCNC: NEGATIVE MG/DL
HADV DNA SPEC QL NAA+PROBE: NOT DETECTED
HCOV PNL SPEC NAA+PROBE: NOT DETECTED
HCT VFR BLD AUTO: 47.2 % (ref 40–53)
HGB BLD-MCNC: 16.3 G/DL (ref 13.3–17.7)
HGB UR QL STRIP: NEGATIVE
HMPV RNA SPEC QL NAA+PROBE: NOT DETECTED
HPIV1 RNA SPEC QL NAA+PROBE: NOT DETECTED
HPIV2 RNA SPEC QL NAA+PROBE: NOT DETECTED
HPIV3 RNA SPEC QL NAA+PROBE: NOT DETECTED
HPIV4 RNA SPEC QL NAA+PROBE: NOT DETECTED
IMM GRANULOCYTES # BLD: 0 10E3/UL
IMM GRANULOCYTES NFR BLD: 0 %
KETONES UR STRIP-MCNC: NEGATIVE MG/DL
LEUKOCYTE ESTERASE UR QL STRIP: NEGATIVE
LYMPHOCYTES # BLD AUTO: 0.9 10E3/UL (ref 0.8–5.3)
LYMPHOCYTES NFR BLD AUTO: 12 %
M PNEUMO DNA SPEC QL NAA+PROBE: NOT DETECTED
MCH RBC QN AUTO: 31.3 PG (ref 26.5–33)
MCHC RBC AUTO-ENTMCNC: 34.5 G/DL (ref 31.5–36.5)
MCV RBC AUTO: 91 FL (ref 78–100)
MONOCYTES # BLD AUTO: 0.7 10E3/UL (ref 0–1.3)
MONOCYTES NFR BLD AUTO: 8 %
MUCOUS THREADS #/AREA URNS LPF: PRESENT /LPF
NEUTROPHILS # BLD AUTO: 4.8 10E3/UL (ref 1.6–8.3)
NEUTROPHILS NFR BLD AUTO: 61 %
NITRATE UR QL: NEGATIVE
NRBC # BLD AUTO: 0 10E3/UL
NRBC BLD AUTO-RTO: 0 /100
PH UR STRIP: 6 [PH] (ref 5–7)
PLATELET # BLD AUTO: 240 10E3/UL (ref 150–450)
RBC # BLD AUTO: 5.2 10E6/UL (ref 4.4–5.9)
RBC URINE: <1 /HPF
RSV RNA SPEC QL NAA+PROBE: NOT DETECTED
RSV RNA SPEC QL NAA+PROBE: NOT DETECTED
RV+EV RNA SPEC QL NAA+PROBE: NOT DETECTED
SP GR UR STRIP: 1.03 (ref 1–1.03)
SPERM #/AREA URNS HPF: PRESENT /HPF
SQUAMOUS EPITHELIAL: 1 /HPF
TSH SERPL DL<=0.005 MIU/L-ACNC: 2.25 MU/L (ref 0.4–4)
UROBILINOGEN UR STRIP-MCNC: 6 MG/DL
WBC # BLD AUTO: 7.9 10E3/UL (ref 4–11)
WBC URINE: 5 /HPF

## 2021-10-22 PROCEDURE — 87389 HIV-1 AG W/HIV-1&-2 AB AG IA: CPT | Performed by: PHYSICIAN ASSISTANT

## 2021-10-22 PROCEDURE — 99232 SBSQ HOSP IP/OBS MODERATE 35: CPT | Performed by: PHYSICIAN ASSISTANT

## 2021-10-22 PROCEDURE — 99222 1ST HOSP IP/OBS MODERATE 55: CPT | Performed by: STUDENT IN AN ORGANIZED HEALTH CARE EDUCATION/TRAINING PROGRAM

## 2021-10-22 PROCEDURE — 36415 COLL VENOUS BLD VENIPUNCTURE: CPT | Performed by: PHYSICIAN ASSISTANT

## 2021-10-22 PROCEDURE — 87633 RESP VIRUS 12-25 TARGETS: CPT | Performed by: PHYSICIAN ASSISTANT

## 2021-10-22 PROCEDURE — 36415 COLL VENOUS BLD VENIPUNCTURE: CPT | Performed by: PSYCHIATRY & NEUROLOGY

## 2021-10-22 PROCEDURE — 250N000013 HC RX MED GY IP 250 OP 250 PS 637: Performed by: PSYCHIATRY & NEUROLOGY

## 2021-10-22 PROCEDURE — 86140 C-REACTIVE PROTEIN: CPT | Performed by: PHYSICIAN ASSISTANT

## 2021-10-22 PROCEDURE — 124N000002 HC R&B MH UMMC

## 2021-10-22 PROCEDURE — 258N000003 HC RX IP 258 OP 636: Performed by: PHYSICIAN ASSISTANT

## 2021-10-22 PROCEDURE — 81001 URINALYSIS AUTO W/SCOPE: CPT | Performed by: PSYCHIATRY & NEUROLOGY

## 2021-10-22 PROCEDURE — 87040 BLOOD CULTURE FOR BACTERIA: CPT | Performed by: PHYSICIAN ASSISTANT

## 2021-10-22 PROCEDURE — 99233 SBSQ HOSP IP/OBS HIGH 50: CPT | Performed by: PSYCHIATRY & NEUROLOGY

## 2021-10-22 PROCEDURE — 250N000013 HC RX MED GY IP 250 OP 250 PS 637: Performed by: PHYSICIAN ASSISTANT

## 2021-10-22 PROCEDURE — 85652 RBC SED RATE AUTOMATED: CPT | Performed by: PHYSICIAN ASSISTANT

## 2021-10-22 PROCEDURE — 999N000127 HC STATISTIC PERIPHERAL IV START W US GUIDANCE

## 2021-10-22 PROCEDURE — 999N000040 HC STATISTIC CONSULT NO CHARGE VASC ACCESS

## 2021-10-22 PROCEDURE — 84443 ASSAY THYROID STIM HORMONE: CPT | Performed by: PHYSICIAN ASSISTANT

## 2021-10-22 PROCEDURE — 85025 COMPLETE CBC W/AUTO DIFF WBC: CPT | Performed by: PSYCHIATRY & NEUROLOGY

## 2021-10-22 RX ADMIN — POLYETHYLENE GLYCOL 3350 17 G: 17 POWDER, FOR SOLUTION ORAL at 08:53

## 2021-10-22 RX ADMIN — Medication 50 MCG: at 08:53

## 2021-10-22 RX ADMIN — ACETAMINOPHEN 650 MG: 325 TABLET, FILM COATED ORAL at 20:36

## 2021-10-22 RX ADMIN — ASPIRIN 81 MG CHEWABLE TABLET 81 MG: 81 TABLET CHEWABLE at 08:53

## 2021-10-22 RX ADMIN — DOCUSATE SODIUM AND SENNOSIDES 2 TABLET: 8.6; 5 TABLET ORAL at 08:53

## 2021-10-22 RX ADMIN — MEMANTINE 5 MG: 5 TABLET ORAL at 20:21

## 2021-10-22 RX ADMIN — METOPROLOL TARTRATE 25 MG: 25 TABLET, FILM COATED ORAL at 20:21

## 2021-10-22 RX ADMIN — ATORVASTATIN CALCIUM 80 MG: 80 TABLET, FILM COATED ORAL at 20:20

## 2021-10-22 RX ADMIN — SALMETEROL XINAFOATE 1 PUFF: 50 POWDER, METERED ORAL; RESPIRATORY (INHALATION) at 20:20

## 2021-10-22 RX ADMIN — LEVOTHYROXINE SODIUM 88 MCG: 88 TABLET ORAL at 08:53

## 2021-10-22 RX ADMIN — SALMETEROL XINAFOATE 1 PUFF: 50 POWDER, METERED ORAL; RESPIRATORY (INHALATION) at 08:53

## 2021-10-22 RX ADMIN — CLOZAPINE 375 MG: 100 TABLET ORAL at 20:20

## 2021-10-22 RX ADMIN — SODIUM CHLORIDE, POTASSIUM CHLORIDE, SODIUM LACTATE AND CALCIUM CHLORIDE 1000 ML: 600; 310; 30; 20 INJECTION, SOLUTION INTRAVENOUS at 17:48

## 2021-10-22 RX ADMIN — MEMANTINE 5 MG: 5 TABLET ORAL at 08:53

## 2021-10-22 RX ADMIN — METOPROLOL TARTRATE 25 MG: 25 TABLET, FILM COATED ORAL at 08:53

## 2021-10-22 ASSESSMENT — ACTIVITIES OF DAILY LIVING (ADL)
DRESS: SCRUBS (BEHAVIORAL HEALTH);WITH ASSISTANCE
LAUNDRY: UNABLE TO COMPLETE
LAUNDRY: UNABLE TO COMPLETE
DRESS: PROMPTS;INDEPENDENT
HYGIENE/GROOMING: PROMPTS;INDEPENDENT
ORAL_HYGIENE: PROMPTS;INDEPENDENT
ORAL_HYGIENE: WITH ASSISTANCE

## 2021-10-22 NOTE — PROGRESS NOTES
Internal Medicine called the unit and was updated on Pt status.  They will follow up with Pt in the morning.    Consult to internal medicine was modified from ASAP to routine per Dr Lincoln.  Will continue to monitor Pt.

## 2021-10-22 NOTE — PROGRESS NOTES
Pt allowed VS assessment but then became irritable during assessment.  Writer reminded Pt that he has a urine collection receptacle in his toilet and to provide urine specimen when he can.  No urine specimen provided yet.   /72 (BP Location: Right arm)   Pulse 89   Temp 98.9  F (37.2  C) (Tympanic)   Resp 20   Ht 1.829 m (6')   Wt 87.4 kg (192 lb 9.6 oz)   SpO2 96%   BMI 26.12 kg/m    Remains resting in bed.
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
## 2021-10-22 NOTE — PLAN OF CARE
Assessment/Intervention/Current Symtoms and Care Coordination  -Chart review  -Pre round meeting with team  -Rounded with team, addressed patient needs/concerns  -Post round meeting with team  Current Symptoms include the following: Psychosis, disorganization and confusion.        Discharge Plan or Goal  Considerations include: locked behavorial unit in nursing home     Barriers to Discharge  The pt does not have a guardian and a guardian has not been found for this pt. Nursing homes are requiring a guardian.     Referral Status  locked behavioral unit in nursing home  no new outside referrals were made today      Legal Status  Patient is under MI commitment in [facility]
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
## 2021-10-22 NOTE — PROGRESS NOTES
Pt resting in bed at this time.  Awake briefly then returned to sleep.  VSS.     BP 99/70 (BP Location: Right arm)   Pulse 68   Temp 98.1  F (36.7  C) (Tympanic)   Resp 18   Ht 1.829 m (6')   Wt 87.4 kg (192 lb 9.6 oz)   SpO2 98%   BMI 26.12 kg/m     Will continue to monitor.

## 2021-10-22 NOTE — CONSULTS
"Internal medicine progress note    Assessment and plan   John Juárez is a 55 year old male with a past medical history of schizophrenia, TBI, alcohol use disorder, COPD, HLD, cardiomyopathy (now resolved), endocarditis s/p bioprosthetic valve replacement (2015), CVA, and hypothyroidism. Patient has been hospitalized since 9/2020 initially at [facility], transferred to inpatient psychiatry 7/2021. Internal medicine was consulted 10/21 for transient fever.     Fatigue   Fever   Patient reports to have 1-2 weeks of increased fatigue and poor oral intake. Development of fever 10/20 to with temp 101.2. Recurrent fever 10/21 evening of 101.3. Patient has been afebrile today. However hypotensive this afternoon at 89/61, repeat 96/69. Workup thus far including CBC, CMP, CK, troponin have been unremarkable. UA without evidence of infection. ESR and CRP elevated at 30 and 37 respectively.  EKG with NSR with RBBB (old). QTc 472. COVID and influenza negative. Patient has also noted to have periods of \"stareing off\" over the last two weeks, where he will not answer questions or respond. Appeared to be responding to internal stimuli during exam. Of note Clozapine initiated during hospitalization. Clozapine level 9/20 was 389; repeat clozapine level 10/8 774. Clozapine reduced to 375 mg HS today given ongoing concern for sedation/fatigue. Absolute eosinophils elevated since 7/2021, increasing to 1.4 today giving rise to concern for clozapine induced myocarditis with fever, elevated inflammatory markers. As above, EKG stable, also considering clozapine induced seizures given periods of \"staring off.\" Patient denies chest pain, dyspnea or SOB, cough, abdominal pain, dysuria.    - Check RVP   - Check TSH    - Obtain CXR   - I and O's   - Give 1L IVF with LR now   - Obtain blood cultures given hx of endocarditis and valve replacement    - Echocardiogram to assess for myocarditis   - Agree with neurology consult to " "evaluate for possible clozapine induced seizure     Today's vital signs, medications and nursing notes were reviewed.     /75   Pulse 86   Temp 99.1  F (37.3  C) (Oral)   Resp 20   Ht 1.829 m (6')   Wt 87.4 kg (192 lb 9.6 oz)   SpO2 95%   BMI 26.12 kg/m        GENERAL: Alert and oriented to self. NAD.   HEENT: Anicteric sclera. Mucous membranes moist.   CV: RRR. S1, S2. No murmurs appreciated.   RESPIRATORY: Effort normal on RA. Lungs CTAB with no wheezing, rales, rhonchi.   GI: Abdomen soft and non distended with normoactive bowel sounds present in all quadrants. No tenderness, rebound, guarding.   NEUROLOGICAL: No focal deficits. Moves all extremities.  Intermittent noted to stare off during interview and not respond. Appeared to be responding to internal stimuli at the time, last about 30 sec-1 minute. Answers questions appropriately following episode.   EXTREMITIES: No peripheral edema. Intact bilateral pedal pulses.   SKIN: No jaundice. No rashes of exposed skin.    Pertinent labs and procedures were reviewed.     Subjective:   Patient reports feeling fine today. Reports I have been more tired \"because there is nothing to do out there.\" Denies any symptoms. Denies chest pain, SOB, cough, dyspnea, dysuria, abdominal pain, rash. Patient intermittent does not answer questions, noted to stare off and appears to be responding to internal stimuli.     Medicine will continue to follow.     No Red PA-C  [facility]  Securely message with the Vocera Web Console (learn more here)  Text page via Mobile [employer] Paging/Directory        "

## 2021-10-22 NOTE — PLAN OF CARE
Pt remained in his room, in bed, all day except to go use the bathroom. Pt declined breakfast, but did eat his magic cup drank some fluids with breakfast. Pt was medication compliant.  Pt ate about 20% of breakfast. Pt refused labs in the morning, but consented with lots of encouragement this afternoon. Urine sent to lab. Pt's respiratory panel sent . Pt stated repeatedly that he wanted to be left alone that there were too many people in his head when writer encouraging lab draw. No BM today. Voided x 2 this shift.

## 2021-10-23 ENCOUNTER — APPOINTMENT (OUTPATIENT)
Dept: CARDIOLOGY | Facility: CLINIC | Age: 56
DRG: 885 | End: 2021-10-23
Attending: PHYSICIAN ASSISTANT
Payer: COMMERCIAL

## 2021-10-23 ENCOUNTER — APPOINTMENT (OUTPATIENT)
Dept: GENERAL RADIOLOGY | Facility: CLINIC | Age: 56
DRG: 885 | End: 2021-10-23
Attending: PHYSICIAN ASSISTANT
Payer: COMMERCIAL

## 2021-10-23 LAB
ATRIAL RATE - MUSE: 93 BPM
DIASTOLIC BLOOD PRESSURE - MUSE: NORMAL MMHG
INTERPRETATION ECG - MUSE: NORMAL
LVEF ECHO: NORMAL
P AXIS - MUSE: 74 DEGREES
PR INTERVAL - MUSE: 168 MS
QRS DURATION - MUSE: 126 MS
QT - MUSE: 376 MS
QTC - MUSE: 467 MS
R AXIS - MUSE: 76 DEGREES
SYSTOLIC BLOOD PRESSURE - MUSE: NORMAL MMHG
T AXIS - MUSE: 44 DEGREES
VENTRICULAR RATE- MUSE: 93 BPM

## 2021-10-23 PROCEDURE — 124N000002 HC R&B MH UMMC

## 2021-10-23 PROCEDURE — 93306 TTE W/DOPPLER COMPLETE: CPT | Mod: 26 | Performed by: INTERNAL MEDICINE

## 2021-10-23 PROCEDURE — 93306 TTE W/DOPPLER COMPLETE: CPT

## 2021-10-23 PROCEDURE — 71046 X-RAY EXAM CHEST 2 VIEWS: CPT

## 2021-10-23 PROCEDURE — 99232 SBSQ HOSP IP/OBS MODERATE 35: CPT | Performed by: PHYSICIAN ASSISTANT

## 2021-10-23 PROCEDURE — 250N000013 HC RX MED GY IP 250 OP 250 PS 637: Performed by: PSYCHIATRY & NEUROLOGY

## 2021-10-23 PROCEDURE — 250N000013 HC RX MED GY IP 250 OP 250 PS 637: Performed by: PHYSICIAN ASSISTANT

## 2021-10-23 PROCEDURE — 99207 PR CDG-MDM COMPONENT: MEETS MODERATE - DOWN CODED: CPT | Performed by: PHYSICIAN ASSISTANT

## 2021-10-23 RX ADMIN — METOPROLOL TARTRATE 25 MG: 25 TABLET, FILM COATED ORAL at 08:11

## 2021-10-23 RX ADMIN — ATORVASTATIN CALCIUM 80 MG: 80 TABLET, FILM COATED ORAL at 21:01

## 2021-10-23 RX ADMIN — ACETAMINOPHEN 650 MG: 325 TABLET, FILM COATED ORAL at 16:20

## 2021-10-23 RX ADMIN — SALMETEROL XINAFOATE 1 PUFF: 50 POWDER, METERED ORAL; RESPIRATORY (INHALATION) at 21:01

## 2021-10-23 RX ADMIN — CLOZAPINE 375 MG: 100 TABLET ORAL at 21:01

## 2021-10-23 RX ADMIN — POLYETHYLENE GLYCOL 3350 17 G: 17 POWDER, FOR SOLUTION ORAL at 08:10

## 2021-10-23 RX ADMIN — MEMANTINE 5 MG: 5 TABLET ORAL at 08:11

## 2021-10-23 RX ADMIN — LEVOTHYROXINE SODIUM 88 MCG: 88 TABLET ORAL at 08:11

## 2021-10-23 RX ADMIN — MEMANTINE 5 MG: 5 TABLET ORAL at 21:01

## 2021-10-23 RX ADMIN — ASPIRIN 81 MG CHEWABLE TABLET 81 MG: 81 TABLET CHEWABLE at 08:11

## 2021-10-23 RX ADMIN — DOCUSATE SODIUM AND SENNOSIDES 2 TABLET: 8.6; 5 TABLET ORAL at 08:10

## 2021-10-23 RX ADMIN — METOPROLOL TARTRATE 25 MG: 25 TABLET, FILM COATED ORAL at 21:01

## 2021-10-23 RX ADMIN — SALMETEROL XINAFOATE 1 PUFF: 50 POWDER, METERED ORAL; RESPIRATORY (INHALATION) at 08:12

## 2021-10-23 RX ADMIN — Medication 50 MCG: at 08:11

## 2021-10-23 ASSESSMENT — ACTIVITIES OF DAILY LIVING (ADL)
ORAL_HYGIENE: PROMPTS;INDEPENDENT
HYGIENE/GROOMING: PROMPTS
HYGIENE/GROOMING: PROMPTS;INDEPENDENT
DRESS: PROMPTS
LAUNDRY: WITH SUPERVISION
LAUNDRY: UNABLE TO COMPLETE
ORAL_HYGIENE: PROMPTS
DRESS: PROMPTS;INDEPENDENT

## 2021-10-23 NOTE — PLAN OF CARE
Patient presents with a blunted affect. Mood presents as calm yet irritable. Is cooperative. Speech is mumbled. Eye contact is poor. Alert x1, self only. Patient denies depression, anxiety, suicidal ideation, SIB, and homicidal ideation. Patient denies auditory/visual hallucinations, yet appears to be responding to internal stimuli; as evidenced by, staring into space and lips moving as if talking to imaginary person or persons. Medication compliant. No medication cheeking noted. Did not eat evening meal. Drank aprroximately 40 ounces of fluids. Patient spent entire shift in his room in bed. No interaction with staff or peers noted.      /80   Pulse 99   Temp 98.9  F (37.2  C) (Tympanic)   Resp 18   Ht 1.829 m (6')   Wt 87.4 kg (192 lb 9.6 oz)   SpO2 96%   BMI 26.12 kg/m         This shift patient's temperature was fluctuating this shift. Had spiked at 99.3. PRN Tylenol x1 given. Upon recheck patient was afebrile (see Vitals flowsheet for details). Was seen by Internal Medicine on day shift today Thursday 10/22. New orders for Blood Cultures, Labs, Chest Xray, Echocardiogram, I&Os, and a one time bolus of Lactated Ringers 1000mL received. Blood Cultures and labs were drawn. Lactated ringers given. See Internal Medicine note for today Friday 10/22 for details of labs, tests, and assesment. Blood Cultures results pending. Negative for COVID, Influenza A and B with today's labs of 10/22 (see Results Review for details of labs for today 10/22). Patient refused to go for Chest Xray this shift. Stated that he was too tired and will go tomorrow Saturday 10/23. Was also seen by Neurology today Friday 10/22 to evaluate for possible Clozapine induced seizures (see Neurology note for details). Will continue to monitor.

## 2021-10-23 NOTE — PLAN OF CARE
Patient has an order for a chest xray.  Patient is transported using a wheelchair and escorted with a RN and Security to the Xray department.  A ticket to ride is completed.       Patient returned from the Xray department.  No incidences occurred during the transport and procedure.

## 2021-10-23 NOTE — PROGRESS NOTES
"Internal Medicine Progress Note      Assessment and plan:  perla Juárez is a 55 year old male admitted on 6/30/2021. He has a history of schizophrenia, TBI, alcohol use disorder, COPD, HLD, cardiomyopathy (now resolved), endocarditis s/p bioprosthetic valve replacement (2015), CVA, and hypothyroidism. Please see initial IM consult from 7/1/21 for details regarding chronic medical conditions. Medicine following in the setting of intermittent fevers and fatigue    Intermittent fevers, fatigue: Febrile evenings of 10/20 and 10/21. Flu and COVID negative 10/20. CK, CMP, CBC, trop unremarkable 10/21. EKG NSR with RBBB. Recurrent fever evening 10/21 prompting further workup: RVP negative. UA band. TSH normal. CXR 10/21 no acute findings. BCx NGTD. ESR 37, CRP 30. Neurology consulted 10/21 and did not feel as if further inpatient neurological workup indicated. Etiology of fevers remains unclear at this time: 2/2 Clozaril vs occult infection vs malignancy vs HIV vs AI vs other. Afebrile over the past 36 hours  - Will defer neurologic workup to neurology and psychiatry  - Echo pending  - Follow BCx  - Check HIV   - CRP and ESR 10/24  - Pending fever curve may need TB testing, [place], possibly [place], etc    Medicine will continue to follow    Objective:  /73 (BP Location: Left arm)   Pulse 95   Temp 98.8  F (37.1  C) (Tympanic)   Resp 16   Ht 1.829 m (6')   Wt 87.4 kg (192 lb 9.6 oz)   SpO2 95%   BMI 26.12 kg/m      Vitals signs reviewed and noted. Writer unable to complete physical exam due to patient refusal and for her own safety as patient sates, \"Fuck you, you cunt!\"    GENERAL: Alert, in bed. Oriented to person. Disoriented to place ([facility]) and time (2099). Disheveled   HEENT: Anicteric sclera. MMM  CV: Refused, deferred for writer safety   RESPIRATORY: Effort normal on room air. Ascultation refused, deferred for writer safety   GI: Refused, deferred for writer safety   NEUROLOGICAL: No focal " "deficits. Moves all extremities. Full exam refused, deferred for writer safety   EXTREMITIES: Refused, deferred for writer safety   SKIN: Refused, deferred for writer safety     Pertinent labs and procedures were reviewed     Subjective:   Writer introducers self and asks writer how he is feeling. He reports he is, \"Fine.\" Writer asks orientation questions, and patient thinks he is at [facility]. When writer informs patient is at [facility], patient immediately asks for dilaudid. Writer declines to give dilaudid, and patient asks if he can have dilaudid tomorrow. When writer says there is no current indication for dilaudid, patient YELLS, \"Fuck you, you cunt!\" Writer dismisses self for her own safety      Twyla Kumar PA-C  Internal Medicine  [phone]      "

## 2021-10-23 NOTE — PLAN OF CARE
Patient presents with a blunted affect. Mood presents as calm yet irritable. Is cooperative. Speech is mumbled. Eye contact is poor. Alert x1, self only. Patient denies depression, anxiety, suicidal ideation, SIB, and homicidal ideation. Patient denies auditory/visual hallucinations, yet appears to be responding to internal stimuli; as evidenced by, staring into space and lips moving as if talking to imaginary person or persons. Medication compliant. No medication cheeking noted. Ate approximately 25% of evening meal and drank 1234cc's of fluids (see I&O flowsheet for details). Patient spent entire shift in his room in bed. No interaction with staff or peers noted.     /74 (BP Location: Right arm)   Pulse 92   Temp 98.9  F (37.2  C) (Tympanic)   Resp 18   Ht 1.829 m (6')   Wt 87.4 kg (192 lb 9.6 oz)   SpO2 95%   BMI 26.12 kg/m       Temperature at start of this shift was WNL. At 1750 temperature was 99.6. PRN Tylenol given. At recheck temperature was WNL limits. Patient was afebrile. See Vitals flowsheet for details. Will continue to monitor.

## 2021-10-24 LAB
CRP SERPL-MCNC: 28 MG/L (ref 0–8)
ERYTHROCYTE [SEDIMENTATION RATE] IN BLOOD BY WESTERGREN METHOD: 27 MM/HR (ref 0–20)

## 2021-10-24 PROCEDURE — 85652 RBC SED RATE AUTOMATED: CPT | Performed by: PHYSICIAN ASSISTANT

## 2021-10-24 PROCEDURE — 124N000002 HC R&B MH UMMC

## 2021-10-24 PROCEDURE — 86140 C-REACTIVE PROTEIN: CPT | Performed by: PHYSICIAN ASSISTANT

## 2021-10-24 PROCEDURE — 36415 COLL VENOUS BLD VENIPUNCTURE: CPT | Performed by: PHYSICIAN ASSISTANT

## 2021-10-24 PROCEDURE — 250N000013 HC RX MED GY IP 250 OP 250 PS 637: Performed by: PSYCHIATRY & NEUROLOGY

## 2021-10-24 PROCEDURE — 250N000013 HC RX MED GY IP 250 OP 250 PS 637: Performed by: PHYSICIAN ASSISTANT

## 2021-10-24 RX ADMIN — DOCUSATE SODIUM AND SENNOSIDES 2 TABLET: 8.6; 5 TABLET ORAL at 08:13

## 2021-10-24 RX ADMIN — ACETAMINOPHEN 650 MG: 325 TABLET, FILM COATED ORAL at 09:56

## 2021-10-24 RX ADMIN — ACETAMINOPHEN 650 MG: 325 TABLET, FILM COATED ORAL at 16:18

## 2021-10-24 RX ADMIN — CLOZAPINE 375 MG: 100 TABLET ORAL at 20:56

## 2021-10-24 RX ADMIN — MEMANTINE 5 MG: 5 TABLET ORAL at 20:56

## 2021-10-24 RX ADMIN — SALMETEROL XINAFOATE 1 PUFF: 50 POWDER, METERED ORAL; RESPIRATORY (INHALATION) at 20:55

## 2021-10-24 RX ADMIN — MEMANTINE 5 MG: 5 TABLET ORAL at 08:13

## 2021-10-24 RX ADMIN — ATORVASTATIN CALCIUM 80 MG: 80 TABLET, FILM COATED ORAL at 20:55

## 2021-10-24 RX ADMIN — ASPIRIN 81 MG CHEWABLE TABLET 81 MG: 81 TABLET CHEWABLE at 08:13

## 2021-10-24 RX ADMIN — POLYETHYLENE GLYCOL 3350 17 G: 17 POWDER, FOR SOLUTION ORAL at 08:13

## 2021-10-24 RX ADMIN — SALMETEROL XINAFOATE 1 PUFF: 50 POWDER, METERED ORAL; RESPIRATORY (INHALATION) at 08:14

## 2021-10-24 RX ADMIN — Medication 50 MCG: at 08:13

## 2021-10-24 RX ADMIN — LEVOTHYROXINE SODIUM 88 MCG: 88 TABLET ORAL at 08:13

## 2021-10-24 RX ADMIN — METOPROLOL TARTRATE 25 MG: 25 TABLET, FILM COATED ORAL at 20:56

## 2021-10-24 RX ADMIN — METOPROLOL TARTRATE 25 MG: 25 TABLET, FILM COATED ORAL at 08:13

## 2021-10-24 ASSESSMENT — ACTIVITIES OF DAILY LIVING (ADL)
HYGIENE/GROOMING: PROMPTS;INDEPENDENT
HYGIENE/GROOMING: PROMPTS
DRESS: PROMPTS;INDEPENDENT
LAUNDRY: UNABLE TO COMPLETE
ORAL_HYGIENE: PROMPTS;INDEPENDENT
DRESS: PROMPTS
LAUNDRY: UNABLE TO COMPLETE
ORAL_HYGIENE: PROMPTS

## 2021-10-24 NOTE — PLAN OF CARE
Patient only picked at his breakfast, ate about 25 % of lunch.  625 ml in, out put large amount of incontinint   Urine mixed with Bm. Was up and urinated but did not go in the hat.

## 2021-10-24 NOTE — PROGRESS NOTES
Pt had approximately 6.5 hours of sleep and remains resting in bed at this time.  Pt did not report any concerns at this time and did not require any PRN medications tonight.   I&O totals for shift:  240 PO intake, 550 mL UOP.

## 2021-10-24 NOTE — PROGRESS NOTES
Brief Medicine Note    Medicine following for intermittent fevers and fatigue. Did have one recurrent fever of 100.6F on 10/23 at 1630. No recurrence, HD stable.     ECHO reviewed today which was a technically difficult study but with EF 55-60%, RV poorly visualized but function probably normal. S/p mitral valve and aortic valve replacement with normal doppler interrogation and aortic root repair with pericardial patch.     ESR 27 (37), CRP 28 (30). HIV pending. Blood cultures with NGTD.     Today's vital signs, medications, and nursing notes were reviewed.      A/P:  Intermittent fevers, fatigue:   Febrile evenings of 10/20 and 10/21 and again on 10/23. Flu and COVID negative 10/20. CK, CMP, CBC, trop unremarkable 10/21. EKG NSR with RBBB. Recurrent fever evening 10/21 prompting further workup: RVP negative. UA band. TSH normal. CXR 10/21 no acute findings. BCx NGTD. ESR 37, CRP 30. Neurology consulted 10/21 and did not feel as if further inpatient neurological workup indicated. Etiology of fevers remains unclear at this time: 2/2 Clozaril vs occult infection vs malignancy vs HIV vs AI vs other. ECHO techically limited study but no obvious findings (See above). ESR now 27, CRP stable at 28.   - Will defer neurologic workup to neurology and psychiatry  - Follow BCx - NGTD  - HIV pending  - CBC, CRP and ESR 10/25  - Pending fever curve may need TB testing, [place], possibly CT, etc -> if recurrent fever, would consult ID for assistance with further work-up at that time     Medicine will continue to follow. Please notify medicine with any additional questions or concerns.    Kmi Harman PA-C  Hospitalist Service  Pager [phone]

## 2021-10-24 NOTE — PLAN OF CARE
Problem: Cognitive Impairment (Psychotic Signs/Symptoms)  Goal: Optimal Cognitive Function (Psychotic Signs/Symptoms)  Flowsheets (Taken 10/24/2021 1006)  Mutually Determined Action Steps (Optimal Cognitive Function):   follows step-by-step instructions   participates in attention training  Intervention: Support and Promote Cognitive Ability  Recent Flowsheet Documentation  Taken 10/24/2021 0909 by Tess Vaca RN  Trust Relationship/Rapport:   emotional support provided   thoughts/feelings acknowledged     Problem: Cognitive Impairment (Psychotic Signs/Symptoms)  Goal: Optimal Cognitive Function (Psychotic Signs/Symptoms)  Flowsheets (Taken 10/24/2021 1006)  Mutually Determined Action Steps (Optimal Cognitive Function):   follows step-by-step instructions   participates in attention training  Intervention: Support and Promote Cognitive Ability  Recent Flowsheet Documentation  Taken 10/24/2021 0909 by Tess Vaca RN  Trust Relationship/Rapport:   emotional support provided   thoughts/feelings acknowledged   Patient is alert and oriented x1.  Temp 99.2  97/74.  Ate very little breakfast, took a few bites of pancakes and a few gulps of juice. He was medication compliant, and did drink water with meds. Blunted affect, mood remain labile but usually calm.  Easily frustrated when staff are trying to encourage cares.  Talking more to this staff but very hard to understand. He was talking about he has relatives all over the states that he could go to although could not say where.  He still is focused on smoking not understanding why he cant smoke here. He also brought up about his heart surgery again but not understanding anything else.  He refuses any cares when it comes to washing up or shower he becomes upset. He had a large soaked pull-up on the floor which looked like dark bm mixed.  Will continue to monitor.

## 2021-10-25 LAB
CRP SERPL-MCNC: 22 MG/L (ref 0–8)
ERYTHROCYTE [DISTWIDTH] IN BLOOD BY AUTOMATED COUNT: 12 % (ref 10–15)
ERYTHROCYTE [SEDIMENTATION RATE] IN BLOOD BY WESTERGREN METHOD: 33 MM/HR (ref 0–20)
HCT VFR BLD AUTO: 45.4 % (ref 40–53)
HGB BLD-MCNC: 15.8 G/DL (ref 13.3–17.7)
HIV 1+2 AB+HIV1 P24 AG SERPL QL IA: NONREACTIVE
MCH RBC QN AUTO: 30.7 PG (ref 26.5–33)
MCHC RBC AUTO-ENTMCNC: 34.8 G/DL (ref 31.5–36.5)
MCV RBC AUTO: 88 FL (ref 78–100)
PLATELET # BLD AUTO: 257 10E3/UL (ref 150–450)
RBC # BLD AUTO: 5.14 10E6/UL (ref 4.4–5.9)
WBC # BLD AUTO: 5.8 10E3/UL (ref 4–11)

## 2021-10-25 PROCEDURE — 85652 RBC SED RATE AUTOMATED: CPT | Performed by: PHYSICIAN ASSISTANT

## 2021-10-25 PROCEDURE — 36415 COLL VENOUS BLD VENIPUNCTURE: CPT | Performed by: PHYSICIAN ASSISTANT

## 2021-10-25 PROCEDURE — 36415 COLL VENOUS BLD VENIPUNCTURE: CPT | Performed by: PSYCHIATRY & NEUROLOGY

## 2021-10-25 PROCEDURE — 250N000013 HC RX MED GY IP 250 OP 250 PS 637: Performed by: PHYSICIAN ASSISTANT

## 2021-10-25 PROCEDURE — 80159 DRUG ASSAY CLOZAPINE: CPT | Performed by: PSYCHIATRY & NEUROLOGY

## 2021-10-25 PROCEDURE — 250N000013 HC RX MED GY IP 250 OP 250 PS 637: Performed by: PSYCHIATRY & NEUROLOGY

## 2021-10-25 PROCEDURE — 124N000002 HC R&B MH UMMC

## 2021-10-25 PROCEDURE — 85027 COMPLETE CBC AUTOMATED: CPT | Performed by: PHYSICIAN ASSISTANT

## 2021-10-25 PROCEDURE — 86140 C-REACTIVE PROTEIN: CPT | Performed by: PHYSICIAN ASSISTANT

## 2021-10-25 PROCEDURE — 99233 SBSQ HOSP IP/OBS HIGH 50: CPT | Performed by: PSYCHIATRY & NEUROLOGY

## 2021-10-25 RX ADMIN — ATORVASTATIN CALCIUM 80 MG: 80 TABLET, FILM COATED ORAL at 20:31

## 2021-10-25 RX ADMIN — METOPROLOL TARTRATE 25 MG: 25 TABLET, FILM COATED ORAL at 09:18

## 2021-10-25 RX ADMIN — METOPROLOL TARTRATE 25 MG: 25 TABLET, FILM COATED ORAL at 20:31

## 2021-10-25 RX ADMIN — MEMANTINE 5 MG: 5 TABLET ORAL at 09:18

## 2021-10-25 RX ADMIN — Medication 50 MCG: at 09:18

## 2021-10-25 RX ADMIN — LEVOTHYROXINE SODIUM 88 MCG: 88 TABLET ORAL at 09:18

## 2021-10-25 RX ADMIN — ASPIRIN 81 MG CHEWABLE TABLET 81 MG: 81 TABLET CHEWABLE at 09:18

## 2021-10-25 RX ADMIN — POLYETHYLENE GLYCOL 3350 17 G: 17 POWDER, FOR SOLUTION ORAL at 09:18

## 2021-10-25 RX ADMIN — MEMANTINE 5 MG: 5 TABLET ORAL at 20:31

## 2021-10-25 RX ADMIN — SALMETEROL XINAFOATE 1 PUFF: 50 POWDER, METERED ORAL; RESPIRATORY (INHALATION) at 20:32

## 2021-10-25 RX ADMIN — DOCUSATE SODIUM AND SENNOSIDES 2 TABLET: 8.6; 5 TABLET ORAL at 09:18

## 2021-10-25 RX ADMIN — CLOZAPINE 375 MG: 100 TABLET ORAL at 20:31

## 2021-10-25 ASSESSMENT — ACTIVITIES OF DAILY LIVING (ADL)
DRESS: SCRUBS (BEHAVIORAL HEALTH)
LAUNDRY: WITH SUPERVISION
HYGIENE/GROOMING: INDEPENDENT
DRESS: SCRUBS (BEHAVIORAL HEALTH);INDEPENDENT
HYGIENE/GROOMING: INDEPENDENT;PROMPTS
ORAL_HYGIENE: INDEPENDENT;PROMPTS
ORAL_HYGIENE: INDEPENDENT
LAUNDRY: WITH SUPERVISION

## 2021-10-25 NOTE — PLAN OF CARE
"  Problem: Behavioral Health Plan of Care  Goal: Develops/Participates in Therapeutic [place] to Support Successful Transition  Outcome: Declining       Pt has remained in bed napping/resting almost the entire shift, sat in lounge for 20-30 minutes while room was being cleaned. Pt ate one bite of breakfast and 25% of lunch, reports no appetite. Intake 725mL of fluids and output 450 mL, urine is franko colored, no pronounced odor noted. No noted incontinence this shift, pt refused assistance or change of brief. Pt denies SI/SIB/HI, noted to be responding to internal stimuli and appears distracted, often taking writer several tries to capture pt attention with verbal cues. Pt oriented to self only, baseline tardive dyskinesia observed, affect is flat and blunted, often with vacant stare. Pt did state \"there's something wrong with my feet,\" pt unable to elaborate any further and refused to allow writer to remove socks and visualize or check circulation. Pt able to wiggle toes on both feet as well as pull up and push down on writer hand with both feet. Pt slightly tachycardic this morning, encouraged to drink fluids every 60-90 minutes by writer and other staff. Pt denies pain, will continue to monitor and support plan of care.   "

## 2021-10-25 NOTE — PLAN OF CARE
Assessment/Intervention/Current Symtoms and Care Coordination  -Chart review  -Pre round meeting with team  -Rounded with team, addressed patient needs/concerns  -Post round meeting with team  Current Symptoms include the following: Psychosis, disorganization and confusion.        Discharge Plan or Goal  Considerations include: locked behavorial unit in nursing home     Barriers to Discharge  The pt does not have a guardian and a guardian has not been found for this pt. Nursing homes are requiring a guardian.     Referral Status  locked behavioral unit in nursing home  no new outside referrals were made today      Legal Status  Patient is under MI commitment in [facility]

## 2021-10-25 NOTE — PROGRESS NOTES
"[facility], [place]   Psychiatric Progress Note  Hospital Day: 117        Interim History:   The patient's care was discussed with the treatment team during the daily team meeting and/or staff's chart notes were reviewed.  Staff report patient isolated to bed, had 2 episodes of elevated temps, improved quickly with tylenol, IM has continued to follow, had Chest Xray and Echo, was agreeable to shower and spent time out of room yesterday, eating 50% of evening meal, no acute events over weekend.     Upon interview pt was lying in bed but awake, reports his mood is \"fine\", denies SI or HI, endorses AH, denies VH. Tolerating medications, denies noticing TD. Writer expressed concern about his abnormal vital signs and team working to find out what could be causing his increased tiredness and elevated temperatures. He states he is feeling more cold and requests another blanket. He agreed to let writer briefly assess his arms and legs for rigidity before placing blanket over him, he had no rigidity noted in upper or lower extremities. He states he is not eating because he is \"mad\" that he can't leave and that his commitment  and used several expletives to express himself. He then requested to rest.          Medications:       aspirin  81 mg Oral Daily     atorvastatin  80 mg Oral At Bedtime     cholecalciferol  1,250 mcg Oral Q7 Days     cloZAPine  375 mg Oral At Bedtime     levothyroxine  88 mcg Oral Daily     memantine  5 mg Oral BID     metoprolol tartrate  25 mg Oral BID     polyethylene glycol  17 g Oral Daily     salmeterol  1 puff Inhalation BID     senna-docusate  2 tablet Oral Daily     vitamin D3  50 mcg Oral Daily          Allergies:     Allergies   Allergen Reactions     Ibuprofen      Latex           Labs:     Recent Results (from the past 48 hour(s))   Echo Complete    Collection Time: 10/23/21 12:25 PM   Result Value Ref Range    LVEF  55-60%    CRP inflammation    " "Collection Time: 10/24/21  7:59 AM   Result Value Ref Range    CRP Inflammation 28.0 (H) 0.0 - 8.0 mg/L   Erythrocyte sedimentation rate auto    Collection Time: 10/24/21  7:59 AM   Result Value Ref Range    Erythrocyte Sedimentation Rate 27 (H) 0 - 20 mm/hr          Psychiatric Examination:     /71 (BP Location: Right arm)   Pulse 104   Temp 97  F (36.1  C) (Tympanic)   Resp 18   Ht 1.829 m (6')   Wt 87.4 kg (192 lb 9.6 oz)   SpO2 95%   BMI 26.12 kg/m    Weight is 192 lbs 9.6 oz  Body mass index is 26.12 kg/m .    Orthostatic Vitals     None        Appearance: awake, alert and poor hygiene   Attitude: slightly more cooperative   Eye Contact:  fair, at times would stare away from writer appears responding to stimuli  Mood:  \"fine\" continues to have periods of irritability   Affect:  mood congruent and intensity is flat  Speech:  soft volume, raspy ,dysarthric; yelled towards end of interview   Language: fluent and intact in English  Psychomotor, Gait, Musculoskeletal:  no evidence of dystonia, or tics, continues to have movements of buccolingual dyskinesia present; assessed for rigidity today, pt tolerated briefly, no noted rigidity in UE or LE bilaterally   Thought Process: disorganized  Associations:  no loose associations  Thought Content:  denies SI and HI; continues to be delusional and observed responding to internal stimuli; endorses AH, denies VH however appears to be experiencing VH as he looks away from interviewer  Insight:  limited  Judgement:  limited  Oriented to:  person  Attention Span and Concentration:  limited  Recent and Remote Memory:  limited  Fund of Knowledge:  delayed    Clinical Global Impressions  First:  Considering your total clinical experience with this particular patient population, how severe are the patient's symptoms at this time?: 7 (07/01/21 0630)  Compared to the patient's condition at the START of treatment, this patient's condition is: 4 (07/01/21 0630)  Most " recent:  Considering your total clinical experience with this particular patient population, how severe are the patient's symptoms at this time?: 7 (10/22/21 1520)  Compared to the patient's condition at the START of treatment, this patient's condition is: 5 (10/22/21 1520)           Precautions:     Behavioral Orders   Procedures     Assault precautions     Cheeking Precautions (behavioral units)     Patient Observed swallowing PO medications; Patient asked to drink water after swallowing medication; Patient in Staff line of sight for 15 minutes after medication given; Mouth checks after PO administration (patient asked to open mouth and stick out their tongue).     Code 1     Code 2     With Security for any imaging/testing needed.     Elopement precautions     Fall precautions     Routine Programming     As clinically indicated     Single Room     Status 15     Every 15 minutes.          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   Stimulant use disorder, in remission.   Transaminitis, possibly medication induced   Hx of CVA  Vit D deficiency  Hypertension  COPD  Hx of infective endocarditis with severe mitral and aortic regurgitation s/p biprostehtic valve replacement 2015  Hx of HFrEF now recovered  Tobacco use disorder  Non-severe malnutrition   Urinary incontinence  Constipation   Malaise         Assessment & Plan:   Assessment and hospital summary:  The patient is a 56yo male with a history of schizophrenia and TBI and multiple medical co-morbidities as above who was admitted after being transferred from [facility] medical [facility] after a nearly year-long stay. Is currently under commitment with Y[facility]z recently amended to include Clozaril. While at [facility] was noted to have ongoing agitation and frequently attempting to elope from  unit requiring 1:1 staffing for safety. He was started on 1:1 staff upon transfer, this was discontinued as patient has been more cooperative without elopement attempts. IM consult completed on admission and continued to follow due to elevated LFTs. Haldol and VPA discontinued due to LFTs. Cross titration completed from risperidone to clozaril after Yanez amended. EPSE/TD movements noted, have appeared stable over past few weeks. Patient has continued to have disorganization and active psychosis symptoms which appear to be at patients baseline. Team continues to work on disposition which barriers include patient has no guardian or next of kin willing to act as surrogate decision maker, see CTC notes for complete details. Patient starting to have increasing symptoms noted week of 9/20, clozapine level ordered and noted to be lower than previous, have added cheeking precautions and increased clozapine dose on 9/24 and moved to split dosing given increasing afternoon agitation. Pt having decreased appetite, increased fatigue over weekend of 10/9-10/10, IM consult placed 10/11, KUB Xray showed large stool burden, bowel regimen modified. Pt was incontinent of stool evening of 10/13. Consolidated clozapine dose to bedtime given concern for daytime sedation with split dosing. Ongoing malaise noted starting 10/11, internal medicine has been contacted periodically.     Psychiatric treatment/inteventions:  Medications:   -continue clozapine at reduced dose of 375mg at bedtime due to ongoing sedation/fatigue; continue to monitor movements consistent with TD, due to ongoing malaise will consider reducing further if work up indicates clozapine contributing to presentation   -continue cheeking precautions, started 9/27 given noted decompensation in recent weeks and lower clozapine level   -continue memantine at 5mg daily for neurocognitive disorder, dose was up to 10mg BID and have been reducing dose, may taper off as patient  does not appear to have benefit and reporting oversedation and to reduce anticholinergic load  -continue risperdal 1mg Q6H PRN agitation   -continue lorazepam 0.5-1.0mg Q4H PRN anxiety or severe agitation    -discontinued benztropine 0.25mg BID on 10/21, tapered off to reduce anticholinergic load  -discontinued rivastigmine patch on 10/21,pt did not seemed to have benefit and to reduce anticholinergic load    Laboratory/Imaging: weekly WBC and ANC for clozapine monitoring continues ANC 10/22 was 4.8 clozapine level 9/20 was 389; repeat clozapine level 10/8 774 ; weekly covid negative 10/21; additional lab work ordered by IM      Patient will be treated in therapeutic milieu with appropriate individual and group therapies as described.     Medical treatment/interventions:  Medical concerns:   IM re-consulted due to return of fever and ongoing malaise 10/22, additional labs ordered along with Echo, updated progress note incomplete at this time. Also placed Neurology consult to rule out any possibility of seizure activity. Neurology did not feel any further work up warranted.    Per last IM progress note on 10/24:  A/P:  Intermittent fevers, fatigue:   Febrile evenings of 10/20 and 10/21 and again on 10/23. Flu and COVID negative 10/20. CK, CMP, CBC, trop unremarkable 10/21. EKG NSR with RBBB. Recurrent fever evening 10/21 prompting further workup: RVP negative. UA band. TSH normal. CXR 10/21 no acute findings. BCx NGTD. ESR 37, CRP 30. Neurology consulted 10/21 and did not feel as if further inpatient neurological workup indicated. Etiology of fevers remains unclear at this time: 2/2 Clozaril vs occult infection vs malignancy vs HIV vs AI vs other. ECHO techically limited study but no obvious findings (See above). ESR now 27, CRP stable at 28.   - Will defer neurologic workup to neurology and psychiatry  - Follow BCx - NGTD  - HIV pending  - CBC, CRP and ESR 10/25  - Pending fever curve may need TB testing, [place],  possibly [place], etc -> if recurrent fever, would consult ID for assistance with further work-up at that time      Medicine will continue to follow. Please notify medicine with any additional questions or concerns.     Kim Harman PA-C  Hospitalist Service    This note was created by undersigned using a Dragon dictation system. All typing errors or contextual distortion are unintentional and software inherent.     Disposition Plan   Reason for ongoing admission: is unable to care for self due to severe psychosis or mariusz and neurocognitive disorder   Discharge location: [facility], likely locked NH facility , unable to progress on discharge planning due to guardianship not being in place, see CTC notes  Discharge Medications: not ordered  Follow-up Appointments: not scheduled  Legal Status: Full MI commitment and Yanez     Entered by: Jeanette Dobbins on 10/25/2021 at 7:27 AM

## 2021-10-26 LAB
CRP SERPL-MCNC: 25 MG/L (ref 0–8)
ERYTHROCYTE [DISTWIDTH] IN BLOOD BY AUTOMATED COUNT: 12.3 % (ref 10–15)
ERYTHROCYTE [SEDIMENTATION RATE] IN BLOOD BY WESTERGREN METHOD: 30 MM/HR (ref 0–20)
HCT VFR BLD AUTO: 47.2 % (ref 40–53)
HGB BLD-MCNC: 16.3 G/DL (ref 13.3–17.7)
MCH RBC QN AUTO: 31.5 PG (ref 26.5–33)
MCHC RBC AUTO-ENTMCNC: 34.5 G/DL (ref 31.5–36.5)
MCV RBC AUTO: 91 FL (ref 78–100)
PLATELET # BLD AUTO: 254 10E3/UL (ref 150–450)
RBC # BLD AUTO: 5.17 10E6/UL (ref 4.4–5.9)
WBC # BLD AUTO: 7.5 10E3/UL (ref 4–11)

## 2021-10-26 PROCEDURE — 36416 COLLJ CAPILLARY BLOOD SPEC: CPT | Performed by: PHYSICIAN ASSISTANT

## 2021-10-26 PROCEDURE — 250N000013 HC RX MED GY IP 250 OP 250 PS 637: Performed by: PHYSICIAN ASSISTANT

## 2021-10-26 PROCEDURE — 85652 RBC SED RATE AUTOMATED: CPT | Performed by: PHYSICIAN ASSISTANT

## 2021-10-26 PROCEDURE — 86140 C-REACTIVE PROTEIN: CPT | Performed by: PHYSICIAN ASSISTANT

## 2021-10-26 PROCEDURE — 99232 SBSQ HOSP IP/OBS MODERATE 35: CPT | Performed by: PSYCHIATRY & NEUROLOGY

## 2021-10-26 PROCEDURE — 250N000013 HC RX MED GY IP 250 OP 250 PS 637: Performed by: PSYCHIATRY & NEUROLOGY

## 2021-10-26 PROCEDURE — 85027 COMPLETE CBC AUTOMATED: CPT | Performed by: PHYSICIAN ASSISTANT

## 2021-10-26 PROCEDURE — 124N000002 HC R&B MH UMMC

## 2021-10-26 RX ADMIN — LEVOTHYROXINE SODIUM 88 MCG: 88 TABLET ORAL at 08:36

## 2021-10-26 RX ADMIN — SALMETEROL XINAFOATE 1 PUFF: 50 POWDER, METERED ORAL; RESPIRATORY (INHALATION) at 08:35

## 2021-10-26 RX ADMIN — MEMANTINE 5 MG: 5 TABLET ORAL at 08:36

## 2021-10-26 RX ADMIN — CLOZAPINE 375 MG: 100 TABLET ORAL at 21:00

## 2021-10-26 RX ADMIN — METOPROLOL TARTRATE 25 MG: 25 TABLET, FILM COATED ORAL at 08:36

## 2021-10-26 RX ADMIN — DOCUSATE SODIUM AND SENNOSIDES 2 TABLET: 8.6; 5 TABLET ORAL at 08:36

## 2021-10-26 RX ADMIN — MEMANTINE 5 MG: 5 TABLET ORAL at 21:00

## 2021-10-26 RX ADMIN — METOPROLOL TARTRATE 25 MG: 25 TABLET, FILM COATED ORAL at 21:00

## 2021-10-26 RX ADMIN — POLYETHYLENE GLYCOL 3350 17 G: 17 POWDER, FOR SOLUTION ORAL at 08:35

## 2021-10-26 RX ADMIN — ASPIRIN 81 MG CHEWABLE TABLET 81 MG: 81 TABLET CHEWABLE at 08:36

## 2021-10-26 RX ADMIN — Medication 50 MCG: at 08:36

## 2021-10-26 RX ADMIN — SALMETEROL XINAFOATE 1 PUFF: 50 POWDER, METERED ORAL; RESPIRATORY (INHALATION) at 21:00

## 2021-10-26 RX ADMIN — ATORVASTATIN CALCIUM 80 MG: 80 TABLET, FILM COATED ORAL at 21:00

## 2021-10-26 ASSESSMENT — ACTIVITIES OF DAILY LIVING (ADL)
DRESS: SCRUBS (BEHAVIORAL HEALTH);INDEPENDENT;PROMPTS
HYGIENE/GROOMING: INDEPENDENT
ORAL_HYGIENE: INDEPENDENT;PROMPTS
HYGIENE/GROOMING: INDEPENDENT;PROMPTS
LAUNDRY: UNABLE TO COMPLETE
LAUNDRY: UNABLE TO COMPLETE
DRESS: SCRUBS (BEHAVIORAL HEALTH)
ORAL_HYGIENE: INDEPENDENT

## 2021-10-26 NOTE — PROGRESS NOTES
"John is refusing to eat. He will not touch his pizza stating \"It hurts my gums.\" Stated \"I'll eat a banana and a peanut butter sandwich\". John was given a bananna but Dietary stated \"Can't give him a peanut butter sandwich as no peanut Butter allowed on Patient unit\". Dietary to send a sunbutter sandwich .Dietary staff was told John has not been eating. Sun butter sandwich at bedside and John now refuses to eat it. John continues to refuse to get OOB. He continues to urinate around the hat pain what is in the hat pan is dark franko in color.  He has drank almost 12 oz. Of fluids. He needed to be reminded that he was at [place] and not [place]. His affect remains flat. hehas been slightly irritable. He continues on Cheeking, Elopement,Fall and Assault precautions. Nursing to continue to support current plan of care.   "

## 2021-10-26 NOTE — PROGRESS NOTES
Brief Medicine Progress Note:    Medicine following for intermittent fevers and fatigue.  Work up thus far with unremarkable CK, CMP, CBC, trop unremarkable 10/21. EKG NSR with RBBB (not new). RVP negative. UA negative. TSH normal. HIV negative. CXR 10/21 no acute findings. BCx NGTD.     TTE 10/23: ECHO reviewed which was a technically difficult study but with EF 55-60%, RV poorly visualized but function probably normal. S/p mitral valve and aortic valve replacement with normal doppler interrogation and aortic root repair with pericardial patch.     CRP 30 (10/22)-->28-->22--> 25 (10/26)    ESR 37 (10/22)-->27-->33-->30 (10/26)    Patient with highest recorded temperature 99.2F in 48 hours.    Given down-trending and then stabilizing CRP, stable ESR and down-trending fever curve, will continue to monitor for BC growth for now (negative at 3 days). Further workup TBD pending any recurrent fevers. Would consult ID should this occur for further direction.    Of note, neurology consulted 10/21 and indicated no further inpatient neurological work-up indicated. 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    Gordo Jordan PA-C on 10/26/2021 at 1:56 PM  Medicine team will continue to follow in the periphery. Please page with questions.

## 2021-10-26 NOTE — PLAN OF CARE
Pt appeared to sleep 7 hours tonight.  Was awake briefly during the night for bathroom use and on one instance had missed the urine collection hat placed in his toilet.  250 mL urine was recorded at 5:30 am.  Pt did not drink any fluids even with encouragement this morning. Pt requesting to sleep in bed some more.

## 2021-10-26 NOTE — PLAN OF CARE
Problem: Psychotic Symptoms  Goal: Psychotic Symptoms  Description: Signs and symptoms of listed problems will be absent or manageable.  Outcome: No Change     RN Assessment:  SI/Self harm:  pt denies  Aggression/agitation/HI:  pt is verbally hostile at times, was swearing at writer this morning. By lunch time pt was much more calm and cooperative  AVH:  pt denies, though does appear to be responding at times AEB talking to self, looking around the room  Sleep: pt slept 7 hours, denies sleep concerns  PRN Med: No PRNs administered this shift  Medication AE: pt denies, continue to monitor for potential TD  Physical Complaints/Issues: pt denies  I & O: see flowsheet for details. Pt ate nothing for breakfast, about half of lunch (half the magic cup, half the pound cake, entire bottle ensure, about 25% of carne asada). Pt reports he voided x1 but flushed it. Pt denies BM this shift.  Pt eats a lot better when guided and encouraged by staff.  LBM: uncertain  ADLs: needs prompts  Visits: none  Vitals:  WNL  COVID 19 Assessment: negative   Milieu Participation: none. Pt spent the whole shift in his room. Refused to come out of room despite multiple prompts.  Behavior: irritable and verbally hostile this morning, much calmer by afternoon. Pt appears distracted and is disoriented. Pt denies all MH concerns.     No other concerns at this time. Nursing will continue to monitor and assess.

## 2021-10-26 NOTE — PROGRESS NOTES
CLINICAL NUTRITION SERVICES - REASSESSMENT NOTE     Nutrition Prescription    RECOMMENDATIONS FOR MDs/PROVIDERS TO ORDER:  None at this time    Malnutrition Status:    Unable to determine     Recommendations already ordered by Registered Dietitian (RD):  Medical food supplement therapy- increase Ensure Enlive to tid with meals and decrease Magic Cup to bid with meals  Place patient care order requesting encouragement for patient with PO as he seems to have better intake at those times  Ordered new weight--RN reports patient not always cooperative, but they will attempt to obtain weight       EVALUATION OF THE PROGRESS TOWARD GOALS   Diet: Regular  Supplements: Magic Cup tid with meals, Ensure Enlive daily at 2 pm  Intake: bites to 50% of meals      NEW FINDINGS   Reviewed chart, noted fevers on 10/20, 10/21, 10/23- unclear etiology at this point, but appears to be improving. Pt irritable and swearing off and on. Spoke with RN who reports pt's intake is better when encouraged. He ate none of his breakfast, but ate about 50% of his lunch including 50% of a Magic Cup and 100% of an Ensure Enlive (495 kcal, 24 g protein from supplements alone)    Weight: weight down 1.9 kg,  2.1% weight loss over 2 weeks, no more recent weights available  Vitals:    06/30/21 2025 07/04/21 0836 07/15/21 0837 07/22/21 0841   Weight: 89.1 kg (196 lb 8 oz) 87.5 kg (192 lb 14.4 oz) 89.3 kg (196 lb 12.8 oz) 89.2 kg (196 lb 9.6 oz)    08/08/21 0932 08/10/21 0753 08/12/21 0849 08/18/21 0700   Weight: 89.8 kg (197 lb 14.4 oz) 89.9 kg (198 lb 1.6 oz) 89.1 kg (196 lb 8 oz) 89.4 kg (197 lb 1.6 oz)    08/29/21 0800 09/05/21 0853 09/07/21 0835 09/16/21 0816   Weight: 89.5 kg (197 lb 4.8 oz) 90.1 kg (198 lb 9.6 oz) 90 kg (198 lb 6.4 oz) 89.1 kg (196 lb 6.4 oz)    09/23/21 0838 09/28/21 0700 10/12/21 0824   Weight: 89.1 kg (196 lb 8 oz) 89.3 kg (196 lb 14.4 oz) 87.4 kg (192 lb 9.6 oz)     Labs: CRP- 25    Malnutrition Assessment  % Intake: </= 50% for  >/= 5 days (severe)  % Weight Loss: Weight loss does not meet criteria  Subcutaneous Fat Loss: Unable to assess  Muscle Loss: Unable to assess  Fluid Accumulation/Edema: None noted  Malnutrition Diagnosis: Unable to determine     Previous Goals   Patient to consume % of nutritionally adequate meal trays TID, or the equivalent with supplements/snacks.  Evaluation: Not met    Previous Nutrition Diagnosis  Inadequate oral intake of unclear etiology (GI, menu fatigue) as evidenced by very inconsistent intake and weight loss    Evaluation: No change    CURRENT NUTRITION DIAGNOSIS  Inadequate oral intake of unclear etiology (GI, menu fatigue) as evidenced by very inconsistent intake and weight loss      INTERVENTIONS  Implementation  Medical food supplement therapy- increase Ensure Enlive to tid with meals and decrease Magic Cup to bid with meals  Place patient care order requesting encouragement for patient with PO as he seems to have better intake at those times  Ordered new weight--RN reports patient not always cooperative, but they will attempt to obtain weight    Goals  Patient to consume % of nutritionally adequate meal trays TID, or the equivalent with supplements/snacks.    Monitoring/Evaluation  Progress toward goals will be monitored and evaluated per protocol.    Najma Suarez RD, LD  ICU/5A/OB/Mental Health Pager (M-F): [phone]  On Call Pager (weekends only): [phone]

## 2021-10-26 NOTE — PLAN OF CARE
Assessment/Intervention/Current Symtoms and Care Coordination  -Chart review  -Pre round meeting with team  -Rounded with team, addressed patient needs/concerns  -Post round meeting with team  Current Symptoms include the following: Psychosis, disorganization and confusion.        Discharge Plan or Goal  Considerations include: locked behavorial unit in nursing home     Barriers to Discharge  The pt does not have a guardian and a guardian has not been found for this pt. Nursing homes are requiring a guardian.     Referral Status  locked behavioral unit in nursing home  no new outside referrals were made today      Legal Status  Patient is under MI commitment in [facility]

## 2021-10-26 NOTE — PLAN OF CARE
"  Problem: Behavioral Disturbance  Goal: Behavioral Disturbance  Description: Signs and symptoms of listed problems will be absent or manageable by discharge or transition of care.  Outcome: No Change     John has been in bed in the dark all shift. He has been irritable when staff is in his room. He has been encouraged to drink some fluids. He has been responding to internal stimuli.  At times when checked on he is talking to himself. Ate nothing for supper. He informed this writer \"I'd drink some Giovanny Benson if you have some of that.\" \"I would like a cigarette too.\" He has a flat, blunted affect. Did not want to get out of bed this evening. Refused fluids the majority of the evening. John continues on Elopement , Fall and assault precautions. Nursing to continue to support current plan of care.  "

## 2021-10-26 NOTE — PROGRESS NOTES
"[facility], [place]   Psychiatric Progress Note  Hospital Day: 118        Interim History:   The patient's care was discussed with the treatment team during the daily team meeting and/or staff's chart notes were reviewed.  Staff report patient continues to isolate in room, did go out to lounge when room being cleaned, eating small amounts at meals, continues to be irritable, no elevated temp overnight, no acute events.     Upon interview pt was sleeping in bed, awoke to voice, reports mood is \"tired\" when asked again states \"fine\", denies SI or HI, denies VH, reports AH. Denies they have changed. [facility] and he stated \"where is the TV?\" and writer directed, he stated he would go out \"later\" but wanted to go to sleep. Oriented to self. Again asks about being discharged and states his commitment is up, it has been more than 6 months. Believes he is at [facility]. Denies having any pain or discomfort. No additional concerns.          Medications:       aspirin  81 mg Oral Daily     atorvastatin  80 mg Oral At Bedtime     cholecalciferol  1,250 mcg Oral Q7 Days     cloZAPine  375 mg Oral At Bedtime     levothyroxine  88 mcg Oral Daily     memantine  5 mg Oral BID     metoprolol tartrate  25 mg Oral BID     polyethylene glycol  17 g Oral Daily     salmeterol  1 puff Inhalation BID     senna-docusate  2 tablet Oral Daily     vitamin D3  50 mcg Oral Daily          Allergies:     Allergies   Allergen Reactions     Ibuprofen      Latex           Labs:     Recent Results (from the past 48 hour(s))   CRP inflammation    Collection Time: 10/24/21  7:59 AM   Result Value Ref Range    CRP Inflammation 28.0 (H) 0.0 - 8.0 mg/L   Erythrocyte sedimentation rate auto    Collection Time: 10/24/21  7:59 AM   Result Value Ref Range    Erythrocyte Sedimentation Rate 27 (H) 0 - 20 mm/hr   Erythrocyte sedimentation rate auto    Collection Time: 10/25/21  8:08 AM   Result " "Value Ref Range    Erythrocyte Sedimentation Rate 33 (H) 0 - 20 mm/hr   CRP inflammation    Collection Time: 10/25/21  8:08 AM   Result Value Ref Range    CRP Inflammation 22.0 (H) 0.0 - 8.0 mg/L   CBC with platelets    Collection Time: 10/25/21  8:08 AM   Result Value Ref Range    WBC Count 5.8 4.0 - 11.0 10e3/uL    RBC Count 5.14 4.40 - 5.90 10e6/uL    Hemoglobin 15.8 13.3 - 17.7 g/dL    Hematocrit 45.4 40.0 - 53.0 %    MCV 88 78 - 100 fL    MCH 30.7 26.5 - 33.0 pg    MCHC 34.8 31.5 - 36.5 g/dL    RDW 12.0 10.0 - 15.0 %    Platelet Count 257 150 - 450 10e3/uL          Psychiatric Examination:     BP (!) 128/90   Pulse 87   Temp 97.8  F (36.6  C)   Resp 20   Ht 1.829 m (6')   Wt 87.4 kg (192 lb 9.6 oz)   SpO2 97%   BMI 26.12 kg/m    Weight is 192 lbs 9.6 oz  Body mass index is 26.12 kg/m .    Orthostatic Vitals     None        Appearance: awake, alert and poor hygiene   Attitude: slightly more cooperative/responsive than last week  Eye Contact:  fair, at times would stare away from writer appears responding to stimuli  Mood:  \"fine\" continues to have periods of irritability   Affect:  mood congruent and intensity is flat  Speech:  soft volume, raspy ,dysarthric; yelled towards end of interview   Language: fluent and intact in English  Psychomotor, Gait, Musculoskeletal:  no evidence of dystonia, or tics, continues to have movements of buccolingual dyskinesia present  Thought Process: disorganized  Associations:  no loose associations  Thought Content:  denies SI and HI; continues to be delusional and observed responding to internal stimuli; endorses AH, denies VH however appears to be experiencing VH as he looks away from interviewer  Insight:  limited  Judgement:  limited  Oriented to:  person  Attention Span and Concentration:  limited  Recent and Remote Memory:  limited  Fund of Knowledge:  delayed    Clinical Global Impressions  First:  Considering your total clinical experience with this particular " patient population, how severe are the patient's symptoms at this time?: 7 (07/01/21 0630)  Compared to the patient's condition at the START of treatment, this patient's condition is: 4 (07/01/21 0630)  Most recent:  Considering your total clinical experience with this particular patient population, how severe are the patient's symptoms at this time?: 7 (10/22/21 1520)  Compared to the patient's condition at the START of treatment, this patient's condition is: 5 (10/22/21 1520)           Precautions:     Behavioral Orders   Procedures     Assault precautions     Cheeking Precautions (behavioral units)     Patient Observed swallowing PO medications; Patient asked to drink water after swallowing medication; Patient in Staff line of sight for 15 minutes after medication given; Mouth checks after PO administration (patient asked to open mouth and stick out their tongue).     Code 1     Code 2     With Security for any imaging/testing needed.     Elopement precautions     Fall precautions     Routine Programming     As clinically indicated     Single Room     Status 15     Every 15 minutes.          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   Stimulant use disorder, in remission.   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 Is currently under commitment with [employer] recently amended to include Clozaril. While at [facility] was noted to have ongoing agitation and frequently attempting to elope from unit requiring 1:1 staffing for safety. He was started on 1:1 staff upon transfer, this was discontinued as patient has been more cooperative without elopement attempts. IM consult completed on admission and continued to follow due to elevated LFTs. Haldol and VPA discontinued due to LFTs. Cross titration completed from risperidone to clozaril after Yanez amended. EPSE/TD movements noted, have appeared stable over past few weeks. Patient has continued to have disorganization and active psychosis symptoms which appear to be at patients baseline. Team continues to work on disposition which barriers include patient has no guardian or next of kin willing to act as surrogate decision maker, see CTC notes for complete details. Patient starting to have increasing symptoms noted week of 9/20, clozapine level ordered and noted to be lower than previous, have added cheeking precautions and increased clozapine dose on 9/24 and moved to split dosing given increasing afternoon agitation. Pt having decreased appetite, increased fatigue over weekend of 10/9-10/10, IM consult placed 10/11, KUB Xray showed large stool burden, bowel regimen modified. Pt was incontinent of stool evening of 10/13. Consolidated clozapine dose to bedtime given concern for daytime sedation with split dosing. Ongoing malaise noted starting 10/11, internal medicine has been contacted periodically.     Psychiatric treatment/inteventions:  Medications:   -continue clozapine at reduced dose of 375mg at bedtime due to ongoing sedation/fatigue; continue to monitor movements consistent with TD, due to ongoing malaise will consider reducing further if work up indicates clozapine contributing to presentation   -continue cheeking precautions, started  9/27 given noted decompensation in recent weeks and lower clozapine level   -continue memantine at 5mg daily for neurocognitive disorder, dose was up to 10mg BID and have been reducing dose, may taper off as patient does not appear to have benefit and reporting oversedation and to reduce anticholinergic load  -continue risperdal 1mg Q6H PRN agitation   -continue lorazepam 0.5-1.0mg Q4H PRN anxiety or severe agitation    -discontinued benztropine 0.25mg BID on 10/21, tapered off to reduce anticholinergic load  -discontinued rivastigmine patch on 10/21,pt did not seemed to have benefit and to reduce anticholinergic load    Laboratory/Imaging: weekly WBC and ANC for clozapine monitoring continues ANC 10/22 was 4.8 clozapine level 9/20 was 389; repeat clozapine level 10/8 774 ; weekly covid negative 10/21; repeat clozapine level ordered      Patient will be treated in therapeutic milieu with appropriate individual and group therapies as described.     Medical treatment/interventions:  Medical concerns:   IM re-consulted due to return of fever and ongoing malaise 10/22, additional labs ordered along with Echo, updated progress note incomplete at this time. Also placed Neurology consult to rule out any possibility of seizure activity. Neurology did not feel any further work up warranted.    Per last IM progress note on 10/26:     Medicine following for intermittent fevers and fatigue.  Work up thus far with unremarkable CK, CMP, CBC, trop unremarkable 10/21. EKG NSR with RBBB (not new). RVP negative. UA negative. TSH normal. HIV negative. CXR 10/21 no acute findings. BCx NGTD.      TTE 10/23: ECHO reviewed which was a technically difficult study but with EF 55-60%, RV poorly visualized but function probably normal. S/p mitral valve and aortic valve replacement with normal doppler interrogation and aortic root repair with pericardial patch.      CRP 30 (10/22)-->28-->22--> 25 (10/26)     ESR 37 (10/22)-->27-->33-->30  (10/26)     Patient with highest recorded temperature 99.2F in 48 hours.     Given down-trending and then stabilizing CRP, stable ESR and down-trending fever curve, will continue to monitor for BC growth for now (negative at 3 days). Further workup TBD pending any recurrent fevers. Would consult ID should this occur for further direction.     Of note, neurology consulted 10/21 and indicated no further inpatient neurological work-up indicated. 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     Gordo Jordan PA-C     This note was created by undersigned using a Dragon dictation system. All typing errors or contextual distortion are unintentional and software inherent.     Disposition Plan   Reason for ongoing admission: is unable to care for self due to severe psychosis or mariusz and neurocognitive disorder   Discharge location: D, likely locked NH facility , unable to progress on discharge planning due to guardianship not being in place, see CTC notes  Discharge Medications: not ordered  Follow-up Appointments: not scheduled  Legal Status: Full MI commitment and [facility]     Entered by: Jeanette Dobbins on 10/26/2021 at 7:08 AM

## 2021-10-27 LAB — BACTERIA BLD CULT: NO GROWTH

## 2021-10-27 PROCEDURE — 250N000013 HC RX MED GY IP 250 OP 250 PS 637: Performed by: PHYSICIAN ASSISTANT

## 2021-10-27 PROCEDURE — 124N000002 HC R&B MH UMMC

## 2021-10-27 PROCEDURE — 250N000013 HC RX MED GY IP 250 OP 250 PS 637: Performed by: PSYCHIATRY & NEUROLOGY

## 2021-10-27 RX ADMIN — METOPROLOL TARTRATE 25 MG: 25 TABLET, FILM COATED ORAL at 20:08

## 2021-10-27 RX ADMIN — LEVOTHYROXINE SODIUM 88 MCG: 88 TABLET ORAL at 09:27

## 2021-10-27 RX ADMIN — MEMANTINE 5 MG: 5 TABLET ORAL at 20:07

## 2021-10-27 RX ADMIN — Medication 50 MCG: at 09:26

## 2021-10-27 RX ADMIN — SALMETEROL XINAFOATE 1 PUFF: 50 POWDER, METERED ORAL; RESPIRATORY (INHALATION) at 09:26

## 2021-10-27 RX ADMIN — POLYETHYLENE GLYCOL 3350 17 G: 17 POWDER, FOR SOLUTION ORAL at 09:28

## 2021-10-27 RX ADMIN — ATORVASTATIN CALCIUM 80 MG: 80 TABLET, FILM COATED ORAL at 20:08

## 2021-10-27 RX ADMIN — ASPIRIN 81 MG CHEWABLE TABLET 81 MG: 81 TABLET CHEWABLE at 09:27

## 2021-10-27 RX ADMIN — SALMETEROL XINAFOATE 1 PUFF: 50 POWDER, METERED ORAL; RESPIRATORY (INHALATION) at 20:08

## 2021-10-27 RX ADMIN — MEMANTINE 5 MG: 5 TABLET ORAL at 09:27

## 2021-10-27 RX ADMIN — CLOZAPINE 375 MG: 100 TABLET ORAL at 20:07

## 2021-10-27 RX ADMIN — DOCUSATE SODIUM AND SENNOSIDES 2 TABLET: 8.6; 5 TABLET ORAL at 09:26

## 2021-10-27 ASSESSMENT — ACTIVITIES OF DAILY LIVING (ADL)
ORAL_HYGIENE: INDEPENDENT
DRESS: SCRUBS (BEHAVIORAL HEALTH)
DRESS: PROMPTS;INDEPENDENT
HYGIENE/GROOMING: PROMPTS;INDEPENDENT
LAUNDRY: UNABLE TO COMPLETE
ORAL_HYGIENE: PROMPTS;INDEPENDENT
HYGIENE/GROOMING: INDEPENDENT

## 2021-10-27 NOTE — PLAN OF CARE
"Patient alert and oriented to person, pleasant, blunted, good eye contact, medication compliant. No PRN's given. Pt uncooperative with cares, refusing to get up or eat/drink stating \"I'm not hungry, I'm tired.\" This writer provided education regarding nutritional needs for energy. Pt refused breakfast. I took pts lunch tray into his room and encouraged intake. Pt consumed all of the Ensure, half of the magic cup ice cream, one bite of hamburger, one bite of potatoes/gravy. I encouraged pt to continue eating. When I returned for the tray pt was awake, no additional intake. As I picked up the tray to leave pt stated \"leave the brownie.\"  When asked, pt states he is using the bathroom to urinate this shift. Pt on assault, elopement, cheeking, falls precautions, no behaviors observed. Pt refused participation in assessment regarding SI/HI/SIB, anxiety and depression. Pt did not demonstrate delusional thinking, did not appear to be responding to internal stimuli. Pt isolative in room entire shift, slept most of shift 7.0 hours, appropriate with staff. Pt is able to communicate needs asking this writer for a blanket when I checked in with him. No behaviors noted. Will continue to monitor behavior and encourage engagement.      Problem: Behavioral Health Plan of Care  Goal: Plan of Care Review  Outcome: No Change  Flowsheets (Taken 10/27/2021 1219)  Plan of Care Reviewed With: patient  Patient Agreement with Plan of Care: (did not participate/communicate) other (see comments)     Problem: Sleep Disturbance  Goal: Adequate Sleep/Rest  Outcome: No Change     Problem: Cognitive Impairment (Psychotic Signs/Symptoms)  Goal: Optimal Cognitive Function (Psychotic Signs/Symptoms)  Outcome: No Change  Intervention: Support and Promote Cognitive Ability  Recent Flowsheet Documentation  Taken 10/27/2021 1219 by Lu Clark, RN  Trust Relationship/Rapport:    care explained    choices provided    emotional support provided    " empathic listening provided    questions encouraged    reassurance provided     Problem: Malnutrition  Goal: Improved Nutritional Intake  Outcome: Declining

## 2021-10-27 NOTE — PLAN OF CARE
Assessment/Intervention/Current Symtoms and Care Coordination  -Chart review  -Pre round meeting with team  -Rounded with team, addressed patient needs/concerns  -Post round meeting with team  Current Symptoms include the following: Psychosis, disorganization and confusion.        Discharge Plan or Goal  Considerations include: locked behavorial unit in nursing home     Barriers to Discharge  The pt does not have a guardian and a guardian has not been found for this pt. Nursing homes are requiring a guardian.     Referral Status  locked behavioral unit in nursing home  no new outside referrals were made today      Legal Status  Patient is under MI commitment in [facility]

## 2021-10-27 NOTE — PLAN OF CARE
Patient presents with a blunted affect. Mood presents as calm. Is cooperative. Speech is soft and mumbled. Eye contact is fair. Alert x1, self only. Patient denies depression, anxiety, suicidal ideation, SIB, and homicidal ideation. Patient denies auditory/visual hallucinations, yet appears to be responding to internal stimuli; as evidenced by, staring into space and lips moving as if talking to imaginary person or persons. Medication compliant. No medication cheeking noted. At evening meal patient ate 3 bite of mashed potatoes/gravy, 3 bites of carrots, 3 bites of Magic Cup. Drank 538cc's of fluids for this shift. Had incontinent BM x1 this shift. Patient spent the entire shift in his room in bed. No interaction with staff or peers noted.     /80   Pulse 114   Temp 98.4  F (36.9  C) (Oral)   Resp 16   Ht 1.829 m (6')   Wt 87.4 kg (192 lb 9.6 oz)   SpO2 90%   BMI 26.12 kg/m       Patient was tachycardic this shift. BP, temperature, and O2 all WNL. See Vitals Flowsheet for details.

## 2021-10-27 NOTE — PLAN OF CARE
Pt slept approximately 7 hours tonight.  Awake briefly for BR use.  BR voided but missed urine collection hat.  Recorded 200 mL franko urine at 6 am.  Pt needs encouragement to drink fluids.  Took 120 mL of fluids this shift.  Affect is sleepy and irritable.  Denies any concerns.  Pt reports he is still wearing his incontinence brief at this time and requesting to be left alone so he can sleep.

## 2021-10-28 PROCEDURE — 250N000013 HC RX MED GY IP 250 OP 250 PS 637: Performed by: PSYCHIATRY & NEUROLOGY

## 2021-10-28 PROCEDURE — 250N000013 HC RX MED GY IP 250 OP 250 PS 637: Performed by: PHYSICIAN ASSISTANT

## 2021-10-28 PROCEDURE — 99232 SBSQ HOSP IP/OBS MODERATE 35: CPT | Performed by: PSYCHIATRY & NEUROLOGY

## 2021-10-28 PROCEDURE — 124N000002 HC R&B MH UMMC

## 2021-10-28 RX ADMIN — SALMETEROL XINAFOATE 1 PUFF: 50 POWDER, METERED ORAL; RESPIRATORY (INHALATION) at 08:52

## 2021-10-28 RX ADMIN — SALMETEROL XINAFOATE 1 PUFF: 50 POWDER, METERED ORAL; RESPIRATORY (INHALATION) at 20:36

## 2021-10-28 RX ADMIN — CLOZAPINE 375 MG: 100 TABLET ORAL at 20:36

## 2021-10-28 RX ADMIN — METOPROLOL TARTRATE 25 MG: 25 TABLET, FILM COATED ORAL at 08:51

## 2021-10-28 RX ADMIN — ASPIRIN 81 MG CHEWABLE TABLET 81 MG: 81 TABLET CHEWABLE at 08:52

## 2021-10-28 RX ADMIN — MEMANTINE 5 MG: 5 TABLET ORAL at 20:36

## 2021-10-28 RX ADMIN — LEVOTHYROXINE SODIUM 88 MCG: 88 TABLET ORAL at 08:52

## 2021-10-28 RX ADMIN — POLYETHYLENE GLYCOL 3350 17 G: 17 POWDER, FOR SOLUTION ORAL at 08:51

## 2021-10-28 RX ADMIN — Medication 50 MCG: at 08:52

## 2021-10-28 RX ADMIN — MEMANTINE 5 MG: 5 TABLET ORAL at 08:52

## 2021-10-28 RX ADMIN — CHOLECALCIFEROL CAP 1.25 MG (50000 UNIT) 1250 MCG: 1.25 CAP at 10:48

## 2021-10-28 RX ADMIN — METOPROLOL TARTRATE 25 MG: 25 TABLET, FILM COATED ORAL at 20:36

## 2021-10-28 RX ADMIN — ATORVASTATIN CALCIUM 80 MG: 80 TABLET, FILM COATED ORAL at 20:36

## 2021-10-28 RX ADMIN — DOCUSATE SODIUM AND SENNOSIDES 2 TABLET: 8.6; 5 TABLET ORAL at 08:51

## 2021-10-28 ASSESSMENT — ACTIVITIES OF DAILY LIVING (ADL)
ORAL_HYGIENE: PROMPTS;INDEPENDENT
DRESS: PROMPTS;SCRUBS (BEHAVIORAL HEALTH);INDEPENDENT
HYGIENE/GROOMING: PROMPTS;INDEPENDENT
ORAL_HYGIENE: PROMPTS;INDEPENDENT
DRESS: PROMPTS;INDEPENDENT
HYGIENE/GROOMING: PROMPTS;INDEPENDENT
LAUNDRY: UNABLE TO COMPLETE
LAUNDRY: UNABLE TO COMPLETE

## 2021-10-28 NOTE — PLAN OF CARE
Assessment/Intervention/Current Symtoms and Care Coordination  -Chart review  -Pre round meeting with team  -Rounded with team, addressed patient needs/concerns  -Post round meeting with team  Current Symptoms include the following: Psychosis, disorganization and confusion.        Discharge Plan or Goal  Considerations include: locked behavorial unit in nursing home     Barriers to Discharge  The pt does not have a guardian and a guardian has not been found for this pt. Nursing homes are requiring a guardian.     Referral Status  locked behavioral unit in nursing home  no new outside referrals were made today      Legal Status  Patient is under MI commitment in [facility]

## 2021-10-28 NOTE — PLAN OF CARE
BEHAVIORAL TEAM DISCUSSION    Participants:   Dr. Dobbins, Melissa WEN, Sandra Munguia RN. Coral Mckeon OT      Progress:   This is day 120 of this admission.  Pt has been isolating, not eating.  Pt continues to be incontinent.    Anticipated length of stay:   5 months      Continued Stay Criteria/Rationale:   The pt needs a structured placement and there is no guardian so NHs will not take him.      Medical/Physical:   Pt is being worked up medically and there have been no findings.      Precautions:   Behavioral Orders   Procedures     Assault precautions     Cheeking Precautions (behavioral units)     Patient Observed swallowing PO medications; Patient asked to drink water after swallowing medication; Patient in Staff line of sight for 15 minutes after medication given; Mouth checks after PO administration (patient asked to open mouth and stick out their tongue).     Code 1     Code 2     With Security for any imaging/testing needed.     Elopement precautions     Fall precautions     Routine Programming     As clinically indicated     Single Room     Status 15     Every 15 minutes.     Plan:   Medical work up for physical health symptoms  Medication adjustment  Assist with ADLs  Take away egg crate mattress because of the incontinence      Rationale for change in precautions or plan: no changes

## 2021-10-28 NOTE — PROGRESS NOTES
"[facility] [place]   Psychiatric Progress Note  Hospital Day: 120        Interim History:   The patient's care was discussed with the treatment team during the daily team meeting and/or staff's chart notes were reviewed.  Staff report patient has remained in room, needing much encouragement to eat, intake continues to be limited, taking medications as prescribed, oriented only to self, no further elevated temperatures, no acute events overnight.     Upon interview pt was in bed sleeping, awoke to voice, states mood is \"fine\", denies SI or HI, denies VH. Continues to have AH, states they have not changed. Tolerating medications, denies noticing side effects including TD. Expressed concern about the fact that he is not leaving his room or eating much, he denies having any physical pain or discomfort and states that he lost his appetite \"after my heart surgery\". Informed patient this occurred awhile ago and his appetite was much better a month ago. He was encouraged to spend time out of his room and stated \"later\". No additional concerns.          Medications:       aspirin  81 mg Oral Daily     atorvastatin  80 mg Oral At Bedtime     cholecalciferol  1,250 mcg Oral Q7 Days     cloZAPine  375 mg Oral At Bedtime     levothyroxine  88 mcg Oral Daily     memantine  5 mg Oral BID     metoprolol tartrate  25 mg Oral BID     polyethylene glycol  17 g Oral Daily     salmeterol  1 puff Inhalation BID     senna-docusate  2 tablet Oral Daily     vitamin D3  50 mcg Oral Daily          Allergies:     Allergies   Allergen Reactions     Ibuprofen      Latex           Labs:     Recent Results (from the past 48 hour(s))   CRP inflammation    Collection Time: 10/26/21 11:53 AM   Result Value Ref Range    CRP Inflammation 25.0 (H) 0.0 - 8.0 mg/L   CBC with platelets    Collection Time: 10/26/21 11:53 AM   Result Value Ref Range    WBC Count 7.5 4.0 - 11.0 10e3/uL    RBC Count 5.17 4.40 - 5.90 10e6/uL    " "Hemoglobin 16.3 13.3 - 17.7 g/dL    Hematocrit 47.2 40.0 - 53.0 %    MCV 91 78 - 100 fL    MCH 31.5 26.5 - 33.0 pg    MCHC 34.5 31.5 - 36.5 g/dL    RDW 12.3 10.0 - 15.0 %    Platelet Count 254 150 - 450 10e3/uL   Erythrocyte sedimentation rate auto    Collection Time: 10/26/21 11:53 AM   Result Value Ref Range    Erythrocyte Sedimentation Rate 30 (H) 0 - 20 mm/hr          Psychiatric Examination:     /73 (BP Location: Right arm)   Pulse 119   Temp 98.5  F (36.9  C) (Oral)   Resp 16   Ht 1.829 m (6')   Wt 87.4 kg (192 lb 9.6 oz)   SpO2 94%   BMI 26.12 kg/m    Weight is 192 lbs 9.6 oz  Body mass index is 26.12 kg/m .    Orthostatic Vitals       Most Recent      Lying Orthostatic BP 94/69 10/27 0937    Lying Orthostatic Pulse (bpm) 84 10/27 0937        Appearance: awake, alert and poor hygiene   Attitude: slightly more cooperative/responsive than last week  Eye Contact:  fair, at times would stare away from writer appears responding to stimuli  Mood:  \"fine\" continues to have periods of irritability   Affect:  mood congruent and intensity is flat  Speech:  soft volume, raspy ,dysarthric; did not yell today  Language: fluent and intact in English  Psychomotor, Gait, Musculoskeletal:  no evidence of dystonia, or tics, continues to have movements of buccolingual dyskinesia present  Thought Process: disorganized  Associations:  no loose associations  Thought Content:  denies SI and HI; continues to be delusional and observed responding to internal stimuli; endorses AH, denies VH however appears to be experiencing VH as he looks away from interviewer  Insight:  limited  Judgement:  limited  Oriented to:  person  Attention Span and Concentration:  limited  Recent and Remote Memory:  limited  Fund of Knowledge:  delayed    Clinical Global Impressions  First:  Considering your total clinical experience with this particular patient population, how severe are the patient's symptoms at this time?: 7 (07/01/21 " 0630)  Compared to the patient's condition at the START of treatment, this patient's condition is: 4 (07/01/21 0630)  Most recent:  Considering your total clinical experience with this particular patient population, how severe are the patient's symptoms at this time?: 7 (10/22/21 1520)  Compared to the patient's condition at the START of treatment, this patient's condition is: 5 (10/22/21 1520)           Precautions:     Behavioral Orders   Procedures     Assault precautions     Cheeking Precautions (behavioral units)     Patient Observed swallowing PO medications; Patient asked to drink water after swallowing medication; Patient in Staff line of sight for 15 minutes after medication given; Mouth checks after PO administration (patient asked to open mouth and stick out their tongue).     Code 1     Code 2     With Security for any imaging/testing needed.     Elopement precautions     Fall precautions     Routine Programming     As clinically indicated     Single Room     Status 15     Every 15 minutes.          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   Stimulant use disorder, in remission.   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 Is currently under commitment with Beau  recently amended to include Clozaril. While at [facility] was noted to have ongoing agitation and frequently attempting to elope from unit requiring 1:1 staffing for safety. He was started on 1:1 staff upon transfer, this was discontinued as patient has been more cooperative without elopement attempts. IM consult completed on admission and continued to follow due to elevated LFTs. Haldol and VPA discontinued due to LFTs. Cross titration completed from risperidone to clozaril after Yanez amended. EPSE/TD movements noted, have appeared stable over past few weeks. Patient has continued to have disorganization and active psychosis symptoms which appear to be at patients baseline. Team continues to work on disposition which barriers include patient has no guardian or next of kin willing to act as surrogate decision maker, see CTC notes for complete details. Patient starting to have increasing symptoms noted week of 9/20, clozapine level ordered and noted to be lower than previous, have added cheeking precautions and increased clozapine dose on 9/24 and moved to split dosing given increasing afternoon agitation. Pt having decreased appetite, increased fatigue over weekend of 10/9-10/10, IM consult placed 10/11, KUB Xray showed large stool burden, bowel regimen modified. Pt was incontinent of stool evening of 10/13. Consolidated clozapine dose to bedtime given concern for daytime sedation with split dosing. Ongoing malaise noted starting 10/11, internal medicine has been contacted periodically.     Psychiatric treatment/inteventions:  Medications:   -continue clozapine at reduced dose of 375mg at bedtime due to ongoing sedation/fatigue; continue to monitor movements consistent with TD, due to ongoing malaise will consider reducing further if work up indicates clozapine contributing to presentation   -continue cheeking precautions, started 9/27 given noted decompensation in recent weeks and lower clozapine level   -continue  memantine at 5mg daily for neurocognitive disorder, dose was up to 10mg BID and have been reducing dose, may taper off as patient does not appear to have benefit and reporting oversedation and to reduce anticholinergic load  -continue risperdal 1mg Q6H PRN agitation   -continue lorazepam 0.5-1.0mg Q4H PRN anxiety or severe agitation    -discontinued benztropine 0.25mg BID on 10/21, tapered off to reduce anticholinergic load  -discontinued rivastigmine patch on 10/21,pt did not seemed to have benefit and to reduce anticholinergic load    Laboratory/Imaging: weekly WBC and ANC for clozapine monitoring continues ANC 10/22 was 4.8 clozapine level 9/20 was 389; repeat clozapine level 10/8 774 ; weekly covid negative 10/21; repeat clozapine level ordered 10/25 and in process      Patient will be treated in therapeutic milieu with appropriate individual and group therapies as described.     Medical treatment/interventions:  Medical concerns:   IM re-consulted due to return of fever and ongoing malaise 10/22, additional labs ordered along with Echo. Also placed Neurology consult to rule out any possibility of seizure activity. Neurology did not feel any further work up warranted.    Per last IM progress note on 10/26:     Medicine following for intermittent fevers and fatigue.  Work up thus far with unremarkable CK, CMP, CBC, trop unremarkable 10/21. EKG NSR with RBBB (not new). RVP negative. UA negative. TSH normal. HIV negative. CXR 10/21 no acute findings. BCx NGTD.      TTE 10/23: ECHO reviewed which was a technically difficult study but with EF 55-60%, RV poorly visualized but function probably normal. S/p mitral valve and aortic valve replacement with normal doppler interrogation and aortic root repair with pericardial patch.      CRP 30 (10/22)-->28-->22--> 25 (10/26)     ESR 37 (10/22)-->27-->33-->30 (10/26)     Patient with highest recorded temperature 99.2F in 48 hours.     Given down-trending and then stabilizing  CRP, stable ESR and down-trending fever curve, will continue to monitor for BC growth for now (negative at 3 days). Further workup TBD pending any recurrent fevers. Would consult ID should this occur for further direction.     Of note, neurology consulted 10/21 and indicated no further inpatient neurological work-up indicated. 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     Gordo Jordan PA-C     This note was created by undersigned using a Dragon dictation system. All typing errors or contextual distortion are unintentional and software inherent.     Disposition Plan   Reason for ongoing admission: is unable to care for self due to severe psychosis or mariusz and neurocognitive disorder   Discharge location: TBD, likely locked NH facility , unable to progress on discharge planning due to guardianship not being in place, see CTC notes  Discharge Medications: not ordered  Follow-up Appointments: not scheduled  Legal Status: Full MI commitment and Yanez     Entered by: Jeanette Dobbins on 10/28/2021 at 6:36 AM

## 2021-10-28 NOTE — PLAN OF CARE
Problem: Behavioral Health Plan of Care  Goal: Develops/Participates in Therapeutic Gillespie to Support Successful Transition  Outcome: Declining  Intervention: Foster Therapeutic Gillespie  Recent Flowsheet Documentation  Taken 10/28/2021 1400 by Sandra Mohr RN  Trust Relationship/Rapport:    care explained    emotional support provided    empathic listening provided    questions answered    reassurance provided    questions encouraged    thoughts/feelings acknowledged    choices provided       Pt has spent the entirety of the shift in his room, has been up to the bathroom but did not utilize the hat in his toilet, urine in the bowl noted to be franko in color and clear, no appreciable odor noted at this time. Pt ate approximately 3 bites of breakfast and 25%  of lunch, noted to drink 520mL of fluids this shift. Pt denies any pain, reports he has no appetite and just wants to sleep. Pt noted to be irritable with staff twice this shift, oriented to self only and continues to ask if he can smoke here. Pt denies any thoughts of harming himself or wanting to die, denies wanting to hurt anyone else at this time. Pt endorses AH but states they are not upsetting to him, ADLs are poor but pt refused a shower this shift. No noted incontinence and pt refused to allow writer to check his brief or change his brief when offered. Pt affect is flat and blunted, appears distracted and takes several tries for writer to get his attention at times, will continue to monitor and support plan of care.

## 2021-10-28 NOTE — PLAN OF CARE
Problem: Sleep Disturbance  Goal: Adequate Sleep/Rest  Outcome: Improving     John remains on assault, cheeking, fall, and elopement precautions.    Pt is asleep in bed at beginning of shift. Breathing even and unlabored.     Pt sleeps 7 hours tonight. No overnight concerns or events.

## 2021-10-28 NOTE — PLAN OF CARE
"Patient presents with a blunted affect. Mood presents as irritable yet calm. Is cooperative. Speech is soft and mumbled. Eye contact is fair. Alert x1, self only. Patient denies depression, anxiety, suicidal ideation, SIB, and homicidal ideation. Patient denies auditory/visual hallucinations, yet appears to be responding to internal stimuli; as evidenced by, staring into space and lips moving as if talking to imaginary person or persons. Medication compliant. No medication cheeking noted. At evening meal patient ate 0%, 0% of Magic Cup. Drank 537cc's of fluids for this shift (237 cc's was chocolate Ensure). Patient spent the entire shift in his room in bed. No interaction with staff or peers noted.     /76 (BP Location: Left arm)   Pulse 92   Temp 97.8  F (36.6  C) (Oral)   Resp 16   Ht 1.829 m (6')   Wt 87.4 kg (192 lb 9.6 oz)   SpO2 95%   BMI 26.12 kg/m      This writer sat with patient when his evening meal tray arrived, and was encouraging patient to eat and to drink the fluids that were provided. Patient became upset and began swearing stating \"I'm not fucking hungry and I'm not going to fucking eat. I'm tired and want to sleep.\" This writer asked patient to talk to me and tell me what is going on with him. Writer then verbalized to patient my concern regarding his refusal to eat and to drink fluids. Patient stated \"I've been here for franki long. 6 fucking months. I'm tired. I don't want to fucking eat. I just want to sleep.\" This writer provided empathetic listening, emotional support, and encouragement.     Upon assessment of patient this shift this writer noticed that the entire top of patient's tongue has a white coating.     Patient was tachycardic x1 this shift. BP, temperature, and O2 all WNL. See Vitals Flowsheet for details.  "

## 2021-10-29 LAB
ALBUMIN SERPL-MCNC: 2.8 G/DL (ref 3.4–5)
ALP SERPL-CCNC: 77 U/L (ref 40–150)
ALT SERPL W P-5'-P-CCNC: 39 U/L (ref 0–70)
ANION GAP SERPL CALCULATED.3IONS-SCNC: 6 MMOL/L (ref 3–14)
AST SERPL W P-5'-P-CCNC: 71 U/L (ref 0–45)
BASOPHILS # BLD AUTO: 0.1 10E3/UL (ref 0–0.2)
BASOPHILS NFR BLD AUTO: 1 %
BILIRUB SERPL-MCNC: 0.7 MG/DL (ref 0.2–1.3)
BUN SERPL-MCNC: 17 MG/DL (ref 7–30)
CALCIUM SERPL-MCNC: 8.8 MG/DL (ref 8.5–10.1)
CHLORIDE BLD-SCNC: 109 MMOL/L (ref 94–109)
CLOZAPINE SERPL-MCNC: 828 NG/ML
CLOZAPINE+NOR SERPL-MCNC: 1259 NG/ML
CNO SERPL-MCNC: <100 NG/ML
CO2 SERPL-SCNC: 23 MMOL/L (ref 20–32)
CREAT SERPL-MCNC: 0.83 MG/DL (ref 0.66–1.25)
CRP SERPL-MCNC: 27 MG/L (ref 0–8)
EOSINOPHIL # BLD AUTO: 1.5 10E3/UL (ref 0–0.7)
EOSINOPHIL NFR BLD AUTO: 21 %
ERYTHROCYTE [DISTWIDTH] IN BLOOD BY AUTOMATED COUNT: 12.1 % (ref 10–15)
GFR SERPL CREATININE-BSD FRML MDRD: >90 ML/MIN/1.73M2
GLUCOSE BLD-MCNC: 119 MG/DL (ref 70–99)
HCT VFR BLD AUTO: 43.8 % (ref 40–53)
HGB BLD-MCNC: 15.2 G/DL (ref 13.3–17.7)
IMM GRANULOCYTES # BLD: 0 10E3/UL
IMM GRANULOCYTES NFR BLD: 0 %
LYMPHOCYTES # BLD AUTO: 1 10E3/UL (ref 0.8–5.3)
LYMPHOCYTES NFR BLD AUTO: 14 %
MAGNESIUM SERPL-MCNC: 2.2 MG/DL (ref 1.6–2.3)
MCH RBC QN AUTO: 31.1 PG (ref 26.5–33)
MCHC RBC AUTO-ENTMCNC: 34.7 G/DL (ref 31.5–36.5)
MCV RBC AUTO: 90 FL (ref 78–100)
MONOCYTES # BLD AUTO: 0.5 10E3/UL (ref 0–1.3)
MONOCYTES NFR BLD AUTO: 7 %
NEUTROPHILS # BLD AUTO: 3.9 10E3/UL (ref 1.6–8.3)
NEUTROPHILS NFR BLD AUTO: 57 %
NORCLOZAPINE SERPL-MCNC: 431 NG/ML
NRBC # BLD AUTO: 0 10E3/UL
NRBC BLD AUTO-RTO: 0 /100
PHOSPHATE SERPL-MCNC: 3.6 MG/DL (ref 2.5–4.5)
PLATELET # BLD AUTO: 286 10E3/UL (ref 150–450)
POTASSIUM BLD-SCNC: 3.9 MMOL/L (ref 3.4–5.3)
PROT SERPL-MCNC: 7.9 G/DL (ref 6.8–8.8)
RBC # BLD AUTO: 4.88 10E6/UL (ref 4.4–5.9)
SODIUM SERPL-SCNC: 138 MMOL/L (ref 133–144)
WBC # BLD AUTO: 6.9 10E3/UL (ref 4–11)

## 2021-10-29 PROCEDURE — 250N000013 HC RX MED GY IP 250 OP 250 PS 637: Performed by: PHYSICIAN ASSISTANT

## 2021-10-29 PROCEDURE — 36416 COLLJ CAPILLARY BLOOD SPEC: CPT | Performed by: PSYCHIATRY & NEUROLOGY

## 2021-10-29 PROCEDURE — 258N000003 HC RX IP 258 OP 636: Performed by: PHYSICIAN ASSISTANT

## 2021-10-29 PROCEDURE — 250N000013 HC RX MED GY IP 250 OP 250 PS 637: Performed by: PSYCHIATRY & NEUROLOGY

## 2021-10-29 PROCEDURE — 83735 ASSAY OF MAGNESIUM: CPT | Performed by: PSYCHIATRY & NEUROLOGY

## 2021-10-29 PROCEDURE — 99232 SBSQ HOSP IP/OBS MODERATE 35: CPT | Performed by: PHYSICIAN ASSISTANT

## 2021-10-29 PROCEDURE — 84100 ASSAY OF PHOSPHORUS: CPT | Performed by: PSYCHIATRY & NEUROLOGY

## 2021-10-29 PROCEDURE — 999N000128 HC STATISTIC PERIPHERAL IV START W/O US GUIDANCE

## 2021-10-29 PROCEDURE — 93005 ELECTROCARDIOGRAM TRACING: CPT

## 2021-10-29 PROCEDURE — 93010 ELECTROCARDIOGRAM REPORT: CPT | Performed by: INTERNAL MEDICINE

## 2021-10-29 PROCEDURE — 85025 COMPLETE CBC W/AUTO DIFF WBC: CPT | Performed by: PSYCHIATRY & NEUROLOGY

## 2021-10-29 PROCEDURE — 80053 COMPREHEN METABOLIC PANEL: CPT | Performed by: PSYCHIATRY & NEUROLOGY

## 2021-10-29 PROCEDURE — 99233 SBSQ HOSP IP/OBS HIGH 50: CPT | Performed by: PSYCHIATRY & NEUROLOGY

## 2021-10-29 PROCEDURE — 36415 COLL VENOUS BLD VENIPUNCTURE: CPT | Performed by: PSYCHIATRY & NEUROLOGY

## 2021-10-29 PROCEDURE — 99207 PR CONSULT E&M CHANGED TO SUBSEQUENT LEVEL: CPT | Performed by: PHYSICIAN ASSISTANT

## 2021-10-29 PROCEDURE — 86140 C-REACTIVE PROTEIN: CPT | Performed by: PHYSICIAN ASSISTANT

## 2021-10-29 PROCEDURE — 124N000002 HC R&B MH UMMC

## 2021-10-29 RX ORDER — AMOXICILLIN 250 MG
2 CAPSULE ORAL
Status: DISCONTINUED | OUTPATIENT
Start: 2021-10-29 | End: 2022-01-18 | Stop reason: HOSPADM

## 2021-10-29 RX ORDER — POLYETHYLENE GLYCOL 3350 17 G/17G
17 POWDER, FOR SOLUTION ORAL DAILY PRN
Status: DISCONTINUED | OUTPATIENT
Start: 2021-10-29 | End: 2022-01-17

## 2021-10-29 RX ORDER — CLOTRIMAZOLE 10 MG/1
1 LOZENGE ORAL
Status: DISPENSED | OUTPATIENT
Start: 2021-10-29 | End: 2021-11-05

## 2021-10-29 RX ADMIN — POLYETHYLENE GLYCOL 3350 17 G: 17 POWDER, FOR SOLUTION ORAL at 08:28

## 2021-10-29 RX ADMIN — METOPROLOL TARTRATE 25 MG: 25 TABLET, FILM COATED ORAL at 21:19

## 2021-10-29 RX ADMIN — SODIUM CHLORIDE, POTASSIUM CHLORIDE, SODIUM LACTATE AND CALCIUM CHLORIDE 1000 ML: 600; 310; 30; 20 INJECTION, SOLUTION INTRAVENOUS at 13:48

## 2021-10-29 RX ADMIN — SALMETEROL XINAFOATE 1 PUFF: 50 POWDER, METERED ORAL; RESPIRATORY (INHALATION) at 21:20

## 2021-10-29 RX ADMIN — MEMANTINE 5 MG: 5 TABLET ORAL at 21:19

## 2021-10-29 RX ADMIN — Medication 50 MCG: at 08:28

## 2021-10-29 RX ADMIN — MEMANTINE 5 MG: 5 TABLET ORAL at 08:28

## 2021-10-29 RX ADMIN — ASPIRIN 81 MG CHEWABLE TABLET 81 MG: 81 TABLET CHEWABLE at 08:28

## 2021-10-29 RX ADMIN — CLOZAPINE 350 MG: 100 TABLET ORAL at 21:19

## 2021-10-29 RX ADMIN — CLOTRIMAZOLE 1 LOZENGE: 10 LOZENGE ORAL; TOPICAL at 13:39

## 2021-10-29 RX ADMIN — ATORVASTATIN CALCIUM 80 MG: 80 TABLET, FILM COATED ORAL at 21:20

## 2021-10-29 RX ADMIN — DOCUSATE SODIUM AND SENNOSIDES 2 TABLET: 8.6; 5 TABLET ORAL at 08:28

## 2021-10-29 RX ADMIN — LEVOTHYROXINE SODIUM 88 MCG: 88 TABLET ORAL at 08:28

## 2021-10-29 RX ADMIN — SALMETEROL XINAFOATE 1 PUFF: 50 POWDER, METERED ORAL; RESPIRATORY (INHALATION) at 08:32

## 2021-10-29 RX ADMIN — CLOTRIMAZOLE 1 LOZENGE: 10 LOZENGE ORAL; TOPICAL at 18:58

## 2021-10-29 ASSESSMENT — ACTIVITIES OF DAILY LIVING (ADL)
ORAL_HYGIENE: INDEPENDENT;PROMPTS
HYGIENE/GROOMING: INDEPENDENT;PROMPTS
LAUNDRY: UNABLE TO COMPLETE
DRESS: SCRUBS (BEHAVIORAL HEALTH)

## 2021-10-29 NOTE — PROGRESS NOTES
Patient got up to use bathroom, most of the urine not in the toilet hat. Agreed to drink his Boost after writer placed the straw near his mouth, drank 80% of it. Asking when he is going home. Encouraged to eat his breakfast tray which was placed in his room but is saying that he is tired.

## 2021-10-29 NOTE — PROGRESS NOTES
"[facility], [place]   Psychiatric Progress Note  Hospital Day: 121        Interim History:   The patient's care was discussed with the treatment team during the daily team meeting and/or staff's chart notes were reviewed.  Staff report patient continues to isolate to room, taking medications as prescribed, intake minimally and needing much staff encouragement, noted to have white film on tongue, reporting being tired, no acute events overnight.     Upon interview pt was lying in bed, asleep, woke briefly, made eye contact with writer, stated he was \"Tired\", denied SI or HI, denied VH, stated \"yes\" to AH but did not expand. When encouraged to eat breakfast he stated \"no\" and returned to sleeping and difficult to engage. Stated \"no\" to pain. No additional concerns.     Writer discussed patients care with IM who will see patient, concern for bed sores with limited time out of bed, poor intake, hypotension, ongoing malaise and white film on tongue, consult placed, appreciate assistance.    IM ordered EKG which showed QTc of 510ms and discussed with PharmD, clozapine will be reduced further and recheck next week.          Medications:       aspirin  81 mg Oral Daily     atorvastatin  80 mg Oral At Bedtime     cholecalciferol  1,250 mcg Oral Q7 Days     cloZAPine  375 mg Oral At Bedtime     levothyroxine  88 mcg Oral Daily     memantine  5 mg Oral BID     metoprolol tartrate  25 mg Oral BID     polyethylene glycol  17 g Oral Daily     salmeterol  1 puff Inhalation BID     senna-docusate  2 tablet Oral Daily     vitamin D3  50 mcg Oral Daily          Allergies:     Allergies   Allergen Reactions     Ibuprofen      Latex           Labs:     No results found for this or any previous visit (from the past 48 hour(s)).       Psychiatric Examination:     /76 (BP Location: Left arm)   Pulse 110   Temp 97.8  F (36.6  C) (Oral)   Resp 16   Ht 1.829 m (6')   Wt 87.4 kg (192 lb 9.6 oz)   SpO2 " "95%   BMI 26.12 kg/m    Weight is 192 lbs 9.6 oz  Body mass index is 26.12 kg/m .    Orthostatic Vitals     None        Appearance: sleeping, poor hygiene, no distress  Attitude: less cooperative   Eye Contact:  poor  Mood:  \"tired\" continues to have periods of irritability   Affect:  mood congruent and intensity is flat  Speech:  soft volume, raspy ,dysarthric; did not yell today  Language: fluent and intact in English  Psychomotor, Gait, Musculoskeletal:  no evidence of dystonia, or tics, continues to have movements of buccolingual dyskinesia present  Thought Process: disorganized  Associations:  no loose associations  Thought Content:  denies SI and HI; continues to be delusional and observed responding to internal stimuli; endorses AH, denies VH   Insight:  limited  Judgement:  limited  Oriented to:  person  Attention Span and Concentration:  limited  Recent and Remote Memory:  limited  Fund of Knowledge:  delayed    Clinical Global Impressions  First:  Considering your total clinical experience with this particular patient population, how severe are the patient's symptoms at this time?: 7 (07/01/21 0630)  Compared to the patient's condition at the START of treatment, this patient's condition is: 4 (07/01/21 0630)  Most recent:  Considering your total clinical experience with this particular patient population, how severe are the patient's symptoms at this time?: 7 (10/22/21 1520)  Compared to the patient's condition at the START of treatment, this patient's condition is: 5 (10/22/21 1520)           Precautions:     Behavioral Orders   Procedures     Assault precautions     Cheeking Precautions (behavioral units)     Patient Observed swallowing PO medications; Patient asked to drink water after swallowing medication; Patient in Staff line of sight for 15 minutes after medication given; Mouth checks after PO administration (patient asked to open mouth and stick out their tongue).     Code 1     Code 2     With " Security for any imaging/testing needed.     Elopement precautions     Fall precautions     Routine Programming     As clinically indicated     Single Room     Status 15     Every 15 minutes.          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   Stimulant use disorder, in remission.   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 Is currently under commitment with Yanez recently amended to include Clozaril. While at [facility] was noted to have ongoing agitation and frequently attempting to elope from unit requiring 1:1 staffing for safety. He was started on 1:1 staff upon transfer, this was discontinued as patient has been more cooperative without elopement attempts. IM consult completed on admission and continued to follow due to elevated LFTs. Haldol and VPA discontinued due to LFTs. Cross titration completed from risperidone to clozaril after Yanez amended. EPSE/TD movements noted, have appeared stable over past few weeks. Patient has continued to have disorganization and active psychosis symptoms which appear to be at patients baseline. Team continues to work on disposition which barriers include patient has no guardian or next of kin willing to act as surrogate decision maker, see  CTC notes for complete details. Patient starting to have increasing symptoms noted week of 9/20, clozapine level ordered and noted to be lower than previous, have added cheeking precautions and increased clozapine dose on 9/24 and moved to split dosing given increasing afternoon agitation. Pt having decreased appetite, increased fatigue over weekend of 10/9-10/10, IM consult placed 10/11, KUB Xray showed large stool burden, bowel regimen modified. Pt was incontinent of stool evening of 10/13. Consolidated clozapine dose to bedtime given concern for daytime sedation with split dosing. Ongoing malaise noted starting 10/11, internal medicine has been contacted periodically.     Psychiatric treatment/inteventions:  Medications:   -reduce clozapine to 350mg at bedtime due to ongoing sedation/fatigue and now prolonged QTc; continue to monitor movements consistent with TD  -continue cheeking precautions, started 9/27 given noted decompensation in recent weeks and lower clozapine level   -continue memantine at 5mg daily for neurocognitive disorder, dose was up to 10mg BID and have been reducing dose, may taper off as patient does not appear to have benefit and reporting oversedation and to reduce anticholinergic load  -continue risperdal 1mg Q6H PRN agitation   -continue lorazepam 0.5-1.0mg Q4H PRN anxiety or severe agitation    -discontinued benztropine 0.25mg BID on 10/21, tapered off to reduce anticholinergic load  -discontinued rivastigmine patch on 10/21,pt did not seemed to have benefit and to reduce anticholinergic load    Laboratory/Imaging: CMP with Mg, Phos ordered due to intake decrease; weekly WBC and ANC for clozapine monitoring continues ANC 10/22 was 4.8 clozapine level 9/20 was 389; repeat clozapine level 10/8 774 ; weekly covid negative 10/21; repeat clozapine level 10/25: , NorCLZ 431, CLZ-N-Oxide <100, CLZ+Metabolite 1259     Patient will be treated in therapeutic milieu with appropriate individual  and group therapies as described.     Medical treatment/interventions:  Medical concerns:   IM re-consulted due to return of fever and ongoing malaise 10/22, additional labs ordered along with Echo. Also placed Neurology consult to rule out any possibility of seizure activity. Neurology did not feel any further work up warranted. Pt continued to have poor intake, now with tachycardia, dark urine and hypotension and possible thrush, also concern for bed sores and loose stools.     Per IM Consult today: Assessment & Recommendations: John Juárez is a 55 year old man with a history of COPD history of prior cardiomyopathy, endocarditis status post bioprosthetic valves (2015), CVA, hypothyroidism, TBI, schizophrenia who is admitted to station 30 with protracted hospitalization for psychoses. Medicine seeing today for concern for thrush, low blood pressure, poor p.o. intake, loose stools, tachycardia, malaise x1 month     (Please see also prior medicine note for further details on patient's other history including consult note on 10/22/2021)     Malaise and fatigue   Poor p.o. intake  Malnutrition status unable to be determined  Low bp  patient previously had a fever noted back on 10/20/2021 and had a very extensive is detailed in his consult note which is per above. Ultimately was negative for infection. He was also assessed per neurology. His work-up did not reveal an underlying neurologic or metabolic pathology. Patient is now presenting with a x1 measure blood pressure of 86/68 and has been noted per nursing to have poor p.o. intake.  -Most recent RD follow-up on 10/26/2021 appreciate input  -Continue to pursue goal of patient consuming 75 to 100% of meals and continue supplements as ordered per RD  -LR bolus x1 now  -Follow-up CMP and CBC as ordered per psychiatry-- labs are stable  -Trend vitals     Concern for thrush  Patient has a white macule on the tongue that is not completely sent with thrush and has no  clear exudates, throat erythema or difficulty eating or swallowing.  -Encourage improved dental cares and brushing teeth twice daily  -Given cannot rule out thrush will start clotrimazole 5 times daily x7 days     Loose stools  Occurs in the setting of patient receiving scheduled bowel meds  -Change MiraLAX daily to as needed  -Change senna oral daily to prn     Addendum:  bp now normotensive after 1L LR bolus, continue to monitor     Medicine will follow up bp, please page with any additional concerns.      Milagros Ray PA-C  Hospitalist Service      This note was created by laila using a Dragon dictation system. All typing errors or contextual distortion are unintentional and software inherent.     Disposition Plan   Reason for ongoing admission: is unable to care for self due to severe psychosis or mariusz and neurocognitive disorder   Discharge location: [facility], likely locked NH facility , unable to progress on discharge planning due to guardianship not being in place, see CTC notes  Discharge Medications: not ordered  Follow-up Appointments: not scheduled  Legal Status: Full MI commitment and Yanez     Entered by: Jeanette Dobbins on 10/29/2021 at 6:29 AM

## 2021-10-29 NOTE — PROGRESS NOTES
Patient has spent the entire time in his room. Patient was encouraged to come out the lounge and a wheelchair brought down to his room but became quite agitated and angry refusing. Patient only had his boost for breakfast. For lunch he ate a few bites of his mashed potatoes, gravy and soup. Patient's adls are poor. Patient had an IV running with LR but attempted to remove it, needed to be redirected by the staff monitoring him with the IV.  Patient was med compliant. Cooperative with blood pressure checks. Drank approximately 630 mL, patient not urinating in hat so approximately 200 was recorded.     Patient evaluation continues. Assessed mood,anxiety,thoughts and behavior.     Patient gradually progressing towards goals.    Patient is encouraged to participate in groups and assisted to develop healthy coping skills.     VS reviewed: BP (!) 88/59   Pulse 101   Temp 98  F (36.7  C) (Oral)   Resp 16   Ht 1.829 m (6')   Wt 87.4 kg (192 lb 9.6 oz)   SpO2 96%   BMI 26.12 kg/m      Length of stay: 121    Refer to daily team meeting notes for individualized plan of care. Nursing will continue to assess.

## 2021-10-29 NOTE — PLAN OF CARE
"Problem: Malnutrition  Goal: Improved Nutritional Intake  Outcome: No Change     John endorses auditory hallucinations, per baseline. He reports hearing the voice of his brother-in-law. This is a comforting thing for him and does not bother him. He denies SI/HI/visual hallucinations. Reports his mood as \"tired.\" Pt only oriented to self. Does not know where he is, why he is here, or what year it is. Requests Giovanny Benson and cigarettes. Tiara affect. Mood is calm. Pt is isolative. Lies in bed all shift.    Pt eats 0% of dinner. He took two sips of his scheduled Ensure. He did not eat his magic cup. Pt accepts fluids frequently this shift.     Intake: About 925 ml  Output: 200 ml. It appears he is inconsistent about using the hat, as Writer also saw urine in the toilet bowl.    Pt accepts HS meds and goes to sleep.  "

## 2021-10-29 NOTE — PLAN OF CARE
Assessment/Intervention/Current Symtoms and Care Coordination  -Chart review  -Pre round meeting with team  -Rounded with team, addressed patient needs/concerns  -Post round meeting with team  Current Symptoms include the following: Psychosis, disorganization and confusion.        Discharge Plan or Goal  Considerations include: locked behavorial unit in nursing home     Barriers to Discharge  The pt does not have a guardian and a guardian has not been found for this pt. Nursing homes are requiring a guardian.     Referral Status  locked behavioral unit in nursing home  no new outside referrals were made today      Legal Status  Patient is under MI commitment in [facility]

## 2021-10-29 NOTE — PLAN OF CARE
"At 0200 rounds, BM odor noted from pt's room.  Pt asleep at this time.  Liquid BM noted in pt's bed linens and scrub pants.  Pt was assisted to toilet and had BM running down both legs.  While on toilet, pt had 1 small loose stool in toilet and another incontinent loose stool while standing in bathroom.  Urine in hat was franko in color, unable to measure amount as it was mixed w/ BM.  Pt was assisted in cleaning up and became frustrated and irritable regarding this, told writer \"F*cking leave me alone b*tch!\"  Pt needed clean scrub x2 during this process.    Pt appears to have slept 5.25 hours this shift.  Pt on elopement, assault, fall & cheeking precautions.  15 minute checks remain ongoing.  Will continue to monitor and support plan of care.      "

## 2021-10-29 NOTE — CONSULTS
Internal Medicine Follow Up Note     Patient: John Juárez  MRN: [id number]  Admission Date: 6/30/2021  Hospital Day # 121    Assessment & Recommendations: John Juárez is a 55 year old man with a history of COPD history of prior cardiomyopathy, endocarditis status post bioprosthetic valves (2015), CVA, hypothyroidism, TBI, schizophrenia who is admitted to station 30 with protracted hospitalization for psychoses. Medicine seeing today for concern for thrush, low blood pressure, poor p.o. intake, loose stools, tachycardia, malaise x1 month    (Please see also prior medicine note for further details on patient's other history including consult note on 10/22/2021)    Malaise and fatigue   Poor p.o. intake  Malnutrition status unable to be determined  Low bp  patient previously had a fever noted back on 10/20/2021 and had a very extensive is detailed in his consult note which is per above. Ultimately was negative for infection. He was also assessed per neurology. His work-up did not reveal an underlying neurologic or metabolic pathology. Patient is now presenting with a x1 measure blood pressure of 86/68 and has been noted per nursing to have poor p.o. intake.  -Most recent RD follow-up on 10/26/2021 appreciate input  -Continue to pursue goal of patient consuming 75 to 100% of meals and continue supplements as ordered per RD  -LR bolus x1 now  -Follow-up CMP and CBC as ordered per psychiatry-- labs are stable  -Trend vitals    Concern for thrush  Patient has a white macule on the tongue that is not completely sent with thrush and has no clear exudates, throat erythema or difficulty eating or swallowing.  -Encourage improved dental cares and brushing teeth twice daily  -Given cannot rule out thrush will start clotrimazole 5 times daily x7 days    Loose stools  Occurs in the setting of patient receiving scheduled bowel meds  -Change MiraLAX daily to as needed  -Change senna oral daily to prn    Addendum:  bp now  normotensive after 1L LR bolus, continue to monitor    Medicine will follow up bp, please page with any additional concerns.     Milagros Ray PA-C  Hospitalist Service  Pager:   [facility] Paging/Directory  _________________________________________________________________    Subjective & Interval History:  Chief complaint of low bp    Primary psychiatrist contacted medicine and noted multiple concerns that included concern for thrush, malaise x1 month, poor p.o. intake, concern for dehydration, loose stools and review of bowel regimen, question risk for bedsores, tachycardia    Patient's history is difficult to obtain as his speech is garbled and somewhat difficult to understand. Patient intermittently responds appropriately to questions especially yes/no questions he denies having any nausea, vomiting, abdominal pain, urinary complaints and endorses that he feels constipated he denies blood in stool. Does not endorse fevers, chills swelling, cough, dysphagia. He endorses having a poor appetite    Last 24 hour care team notes reviewed.   ROS: 4 point ROS (including Respiratory, CV, GI and ) was performed and negative unless otherwise noted in HPI.     Medications: Reviewed in EPIC.    Physical Exam:    Blood pressure (!) 86/68, pulse 92, temperature 98  F (36.7  C), temperature source Oral, resp. rate 16, height 1.829 m (6'), weight 87.4 kg (192 lb 9.6 oz), SpO2 96 %.    GENERAL: Alert and appears oriented to person, speech is somewhat garbled. NAD, resting supine in bed and is able to reposition independently  HEENT: Tongue with white macule without erythema or ulcerations, throat is clear without tonsillar exudates erythema, poor dentition anicteric sclera. Mucous membranes moist.   CV: Positive systolic murmur RRR. S1, S2.    RESPIRATORY: Effort normal. Lungs CTAB with no wheezing, rales, rhonchi.   GI: Abdomen soft, non distended, non tender.   NEUROLOGICAL: No focal deficits. Moves all extremities.     EXTREMITIES: No peripheral edema. Intact bilateral pedal pulses.   SKIN: No jaundice. No rashes on exposed skin.      Labs & Studies of Note: I personally reviewed the following studies:  CBC RESULTS: Recent Labs   Lab Test 10/29/21  0926   WBC 6.9   RBC 4.88   HGB 15.2   HCT 43.8   MCV 90   MCH 31.1   MCHC 34.7   RDW 12.1        Last Comprehensive Metabolic Panel:  Sodium   Date Value Ref Range Status   10/29/2021 138 133 - 144 mmol/L Final   07/01/2021 140 133 - 144 mmol/L Final     Potassium   Date Value Ref Range Status   10/29/2021 3.9 3.4 - 5.3 mmol/L Final   07/01/2021 4.3 3.4 - 5.3 mmol/L Final     Comment:     Specimen slightly hemolyzed, potassium may be falsely elevated     Chloride   Date Value Ref Range Status   10/29/2021 109 94 - 109 mmol/L Final   07/01/2021 107 94 - 109 mmol/L Final     Carbon Dioxide   Date Value Ref Range Status   07/01/2021 28 20 - 32 mmol/L Final     Carbon Dioxide (CO2)   Date Value Ref Range Status   10/29/2021 23 20 - 32 mmol/L Final     Anion Gap   Date Value Ref Range Status   10/29/2021 6 3 - 14 mmol/L Final   07/01/2021 5 3 - 14 mmol/L Final     Glucose   Date Value Ref Range Status   10/29/2021 119 (H) 70 - 99 mg/dL Final   07/01/2021 84 70 - 99 mg/dL Final     Urea Nitrogen   Date Value Ref Range Status   10/29/2021 17 7 - 30 mg/dL Final   07/01/2021 14 7 - 30 mg/dL Final     Creatinine   Date Value Ref Range Status   10/29/2021 0.83 0.66 - 1.25 mg/dL Final   07/01/2021 0.90 0.66 - 1.25 mg/dL Final     GFR Estimate   Date Value Ref Range Status   10/29/2021 >90 >60 mL/min/1.73m2 Final     Comment:     As of July 11, 2021, eGFR is calculated by the CKD-EPI creatinine equation, without race adjustment. eGFR can be influenced by muscle mass, exercise, and diet. The reported eGFR is an estimation only and is only applicable if the renal function is stable.   07/01/2021 >90 >60 mL/min/[1.73_m2] Final     Comment:     Non  GFR Calc  Starting  12/18/2018, serum creatinine based estimated GFR (eGFR) will be   calculated using the Chronic Kidney Disease Epidemiology Collaboration   (CKD-EPI) equation.       Calcium   Date Value Ref Range Status   10/29/2021 8.8 8.5 - 10.1 mg/dL Final   07/01/2021 9.1 8.5 - 10.1 mg/dL Final     Bilirubin Total   Date Value Ref Range Status   10/29/2021 0.7 0.2 - 1.3 mg/dL Final   07/07/2021 0.5 0.2 - 1.3 mg/dL Final     Alkaline Phosphatase   Date Value Ref Range Status   10/29/2021 77 40 - 150 U/L Final   07/07/2021 82 40 - 150 U/L Final     ALT   Date Value Ref Range Status   10/29/2021 39 0 - 70 U/L Final   07/07/2021 129 (H) 0 - 70 U/L Final     AST   Date Value Ref Range Status   10/29/2021 71 (H) 0 - 45 U/L Final   07/07/2021 182 (H) 0 - 45 U/L Final

## 2021-10-30 PROCEDURE — 250N000013 HC RX MED GY IP 250 OP 250 PS 637: Performed by: PHYSICIAN ASSISTANT

## 2021-10-30 PROCEDURE — 124N000002 HC R&B MH UMMC

## 2021-10-30 PROCEDURE — 250N000013 HC RX MED GY IP 250 OP 250 PS 637: Performed by: PSYCHIATRY & NEUROLOGY

## 2021-10-30 RX ADMIN — SALMETEROL XINAFOATE 1 PUFF: 50 POWDER, METERED ORAL; RESPIRATORY (INHALATION) at 20:58

## 2021-10-30 RX ADMIN — MEMANTINE 5 MG: 5 TABLET ORAL at 20:57

## 2021-10-30 RX ADMIN — Medication 50 MCG: at 08:27

## 2021-10-30 RX ADMIN — LEVOTHYROXINE SODIUM 88 MCG: 88 TABLET ORAL at 08:27

## 2021-10-30 RX ADMIN — ATORVASTATIN CALCIUM 80 MG: 80 TABLET, FILM COATED ORAL at 20:57

## 2021-10-30 RX ADMIN — METOPROLOL TARTRATE 25 MG: 25 TABLET, FILM COATED ORAL at 08:41

## 2021-10-30 RX ADMIN — CLOTRIMAZOLE 1 LOZENGE: 10 LOZENGE ORAL; TOPICAL at 08:29

## 2021-10-30 RX ADMIN — MEMANTINE 5 MG: 5 TABLET ORAL at 08:27

## 2021-10-30 RX ADMIN — SALMETEROL XINAFOATE 1 PUFF: 50 POWDER, METERED ORAL; RESPIRATORY (INHALATION) at 08:28

## 2021-10-30 RX ADMIN — CLOZAPINE 350 MG: 100 TABLET ORAL at 20:57

## 2021-10-30 RX ADMIN — ASPIRIN 81 MG CHEWABLE TABLET 81 MG: 81 TABLET CHEWABLE at 08:27

## 2021-10-30 RX ADMIN — CLOTRIMAZOLE 1 LOZENGE: 10 LOZENGE ORAL; TOPICAL at 21:00

## 2021-10-30 ASSESSMENT — ACTIVITIES OF DAILY LIVING (ADL)
LAUNDRY: UNABLE TO COMPLETE
ORAL_HYGIENE: INDEPENDENT
DRESS: INDEPENDENT;PROMPTS
LAUNDRY: UNABLE TO COMPLETE
ORAL_HYGIENE: INDEPENDENT;PROMPTS
DRESS: INDEPENDENT
HYGIENE/GROOMING: INDEPENDENT;PROMPTS
HYGIENE/GROOMING: INDEPENDENT;PROMPTS

## 2021-10-30 NOTE — PROGRESS NOTES
Brief Medicine Note    Follow up regarding bp.     Today's vital signs, medications, and nursing notes were reviewed. Labs review.     /78   Pulse 91   Temp 97  F (36.1  C) (Tympanic)   Resp 16   Ht 1.829 m (6')   Wt 87.4 kg (192 lb 9.6 oz)   SpO2 93%   BMI 26.12 kg/m        A/P:  Hypotension (resolved)  bp is now normotensive  - continue to to pursue goal of patient consuming 75 to 100% of meals and continue supplements as ordered per RD  - encourage po fluids    Medicine will sign off at this time, please call with questions or concerns    Milagros Ray PA-C  [facility]  Contact information available via [facility] Paging/Directory

## 2021-10-30 NOTE — PLAN OF CARE
Patient has spent the entire shift in his room. Patient was encouraged to get out of bed several times but refused. Patient becomes upset and started to yell and swear when told to sit up and eat. Stated he would shower later tonight. Only drank a few sips of juice and 100% boost for breakfast. Ate none of his lunch. Adls are poor. Observed talking to himself. Distracted and sometimes doesn't respond when spoken to. Makes states about wanting to leave.     Patient evaluation continues. Assessed mood,anxiety,thoughts and behavior.     Patient gradually progressing towards goals.    Patient is encouraged to participate in groups and assisted to develop healthy coping skills.     VS reviewed: /78   Pulse 91   Temp 97  F (36.1  C) (Tympanic)   Resp 16   Ht 1.829 m (6')   Wt 87.4 kg (192 lb 9.6 oz)   SpO2 93%   BMI 26.12 kg/m      Length of stay: 122    Refer to daily team meeting notes for individualized plan of care. Nursing will continue to assess.

## 2021-10-30 NOTE — PLAN OF CARE
Problem: Behavioral Health Plan of Care  Goal: Plan of Care Review  Outcome: No Change     Pt appears to have slept 7 hours this shift.  No concerns reported or noted.  No episodes of BM incontinence this shift.  Pt on elopement, fall, assault & cheeking precautions; no roommate status.  Will continue to monitor and support plan of care.

## 2021-10-31 LAB
ATRIAL RATE - MUSE: 100 BPM
DIASTOLIC BLOOD PRESSURE - MUSE: NORMAL MMHG
INTERPRETATION ECG - MUSE: NORMAL
P AXIS - MUSE: 63 DEGREES
PR INTERVAL - MUSE: 162 MS
QRS DURATION - MUSE: 128 MS
QT - MUSE: 396 MS
QTC - MUSE: 510 MS
R AXIS - MUSE: 61 DEGREES
SYSTOLIC BLOOD PRESSURE - MUSE: NORMAL MMHG
T AXIS - MUSE: 45 DEGREES
VENTRICULAR RATE- MUSE: 100 BPM

## 2021-10-31 PROCEDURE — 124N000002 HC R&B MH UMMC

## 2021-10-31 PROCEDURE — 250N000013 HC RX MED GY IP 250 OP 250 PS 637: Performed by: PSYCHIATRY & NEUROLOGY

## 2021-10-31 PROCEDURE — 250N000013 HC RX MED GY IP 250 OP 250 PS 637: Performed by: PHYSICIAN ASSISTANT

## 2021-10-31 RX ADMIN — Medication 50 MCG: at 09:29

## 2021-10-31 RX ADMIN — SALMETEROL XINAFOATE 1 PUFF: 50 POWDER, METERED ORAL; RESPIRATORY (INHALATION) at 09:29

## 2021-10-31 RX ADMIN — ATORVASTATIN CALCIUM 80 MG: 80 TABLET, FILM COATED ORAL at 20:27

## 2021-10-31 RX ADMIN — MEMANTINE 5 MG: 5 TABLET ORAL at 09:29

## 2021-10-31 RX ADMIN — METOPROLOL TARTRATE 25 MG: 25 TABLET, FILM COATED ORAL at 09:29

## 2021-10-31 RX ADMIN — ASPIRIN 81 MG CHEWABLE TABLET 81 MG: 81 TABLET CHEWABLE at 09:29

## 2021-10-31 RX ADMIN — SALMETEROL XINAFOATE 1 PUFF: 50 POWDER, METERED ORAL; RESPIRATORY (INHALATION) at 20:28

## 2021-10-31 RX ADMIN — LEVOTHYROXINE SODIUM 88 MCG: 88 TABLET ORAL at 09:29

## 2021-10-31 RX ADMIN — MEMANTINE 5 MG: 5 TABLET ORAL at 20:27

## 2021-10-31 RX ADMIN — CLOZAPINE 350 MG: 100 TABLET ORAL at 20:26

## 2021-10-31 RX ADMIN — RISPERIDONE 1 MG: 1 TABLET, ORALLY DISINTEGRATING ORAL at 17:39

## 2021-10-31 ASSESSMENT — ACTIVITIES OF DAILY LIVING (ADL)
HYGIENE/GROOMING: INDEPENDENT;PROMPTS
DRESS: SCRUBS (BEHAVIORAL HEALTH);INDEPENDENT;PROMPTS
LAUNDRY: UNABLE TO COMPLETE
ORAL_HYGIENE: INDEPENDENT;PROMPTS

## 2021-10-31 NOTE — PLAN OF CARE
John spent the day in bed. He did not eat breakfast, but drank 240cc of gatorade and 1/2 ensure. He ate 10% of lunch, drank 16 oz fluids, juice and gatorade. He refused a shower, refused to get up and ambulate.    He took all morning meds but refused lozenges.    /80 (BP Location: Left arm)   Pulse 99   Temp 98.3  F (36.8  C) (Oral)   Resp 16   Ht 1.829 m (6')   Wt 87.4 kg (192 lb 9.6 oz)   SpO2 97%   BMI 26.12 kg/m

## 2021-10-31 NOTE — PLAN OF CARE
Problem: Behavioral Health Plan of Care  Goal: Plan of Care Review  Outcome: No Change     Pt appears to have slept 6.5 hours this shift.  No acute events overnight.  Zero fluid intake.  Pt remains on elopement, fall, assault & cheeking precautions; no roommate status.  15 minute checks remain ongoing.  Will continue to monitor and support plan of care.

## 2021-10-31 NOTE — PLAN OF CARE
Patient has spent the entire shift in his room. Patient again was asked to get out of his bed and sit in the lounge. Patient continues to be irritable and agitated. Refused to leave his bed saying that he is tired. Only drank about 4 ounces of his juice. Med compliant.     Patient evaluation continues. Assessed mood,anxiety,thoughts and behavior.     Patient gradually progressing towards goals.    Patient is encouraged to participate in groups and assisted to develop healthy coping skills.     VS reviewed: BP 94/63   Pulse 109   Temp 98.3  F (36.8  C) (Oral)   Resp 16   Ht 1.829 m (6')   Wt 87.4 kg (192 lb 9.6 oz)   SpO2 93%   BMI 26.12 kg/m      Length of stay: 122    Refer to daily team meeting notes for individualized plan of care. Nursing will continue to assess.

## 2021-11-01 PROCEDURE — 250N000013 HC RX MED GY IP 250 OP 250 PS 637: Performed by: PSYCHIATRY & NEUROLOGY

## 2021-11-01 PROCEDURE — 999N000040 HC STATISTIC CONSULT NO CHARGE VASC ACCESS

## 2021-11-01 PROCEDURE — 124N000002 HC R&B MH UMMC

## 2021-11-01 PROCEDURE — 250N000013 HC RX MED GY IP 250 OP 250 PS 637: Performed by: PHYSICIAN ASSISTANT

## 2021-11-01 PROCEDURE — 999N000127 HC STATISTIC PERIPHERAL IV START W US GUIDANCE

## 2021-11-01 PROCEDURE — 258N000003 HC RX IP 258 OP 636: Performed by: PHYSICIAN ASSISTANT

## 2021-11-01 PROCEDURE — 99233 SBSQ HOSP IP/OBS HIGH 50: CPT | Performed by: PSYCHIATRY & NEUROLOGY

## 2021-11-01 RX ADMIN — ATORVASTATIN CALCIUM 80 MG: 80 TABLET, FILM COATED ORAL at 20:52

## 2021-11-01 RX ADMIN — MEMANTINE 5 MG: 5 TABLET ORAL at 20:51

## 2021-11-01 RX ADMIN — CLOTRIMAZOLE 1 LOZENGE: 10 LOZENGE ORAL; TOPICAL at 12:21

## 2021-11-01 RX ADMIN — ASPIRIN 81 MG CHEWABLE TABLET 81 MG: 81 TABLET CHEWABLE at 09:17

## 2021-11-01 RX ADMIN — CLOTRIMAZOLE 1 LOZENGE: 10 LOZENGE ORAL; TOPICAL at 20:51

## 2021-11-01 RX ADMIN — MEMANTINE 5 MG: 5 TABLET ORAL at 09:18

## 2021-11-01 RX ADMIN — SALMETEROL XINAFOATE 1 PUFF: 50 POWDER, METERED ORAL; RESPIRATORY (INHALATION) at 21:49

## 2021-11-01 RX ADMIN — SALMETEROL XINAFOATE 1 PUFF: 50 POWDER, METERED ORAL; RESPIRATORY (INHALATION) at 09:19

## 2021-11-01 RX ADMIN — CLOTRIMAZOLE 1 LOZENGE: 10 LOZENGE ORAL; TOPICAL at 21:48

## 2021-11-01 RX ADMIN — Medication 50 MCG: at 09:19

## 2021-11-01 RX ADMIN — LEVOTHYROXINE SODIUM 88 MCG: 88 TABLET ORAL at 09:17

## 2021-11-01 RX ADMIN — SODIUM CHLORIDE, POTASSIUM CHLORIDE, SODIUM LACTATE AND CALCIUM CHLORIDE 1000 ML: 600; 310; 30; 20 INJECTION, SOLUTION INTRAVENOUS at 17:06

## 2021-11-01 RX ADMIN — CLOZAPINE 350 MG: 100 TABLET ORAL at 21:48

## 2021-11-01 RX ADMIN — CLOTRIMAZOLE 1 LOZENGE: 10 LOZENGE ORAL; TOPICAL at 09:17

## 2021-11-01 RX ADMIN — METOPROLOL TARTRATE 25 MG: 25 TABLET, FILM COATED ORAL at 21:48

## 2021-11-01 ASSESSMENT — ACTIVITIES OF DAILY LIVING (ADL)
ORAL_HYGIENE: INDEPENDENT;PROMPTS
HYGIENE/GROOMING: INDEPENDENT;PROMPTS
LAUNDRY: WITH SUPERVISION
HYGIENE/GROOMING: PROMPTS
DRESS: SCRUBS (BEHAVIORAL HEALTH);PROMPTS
DRESS: INDEPENDENT;PROMPTS

## 2021-11-01 NOTE — PLAN OF CARE
Problem: Behavioral Health Plan of Care  Goal: Plan of Care Review  Outcome: No Change     Pt appears to have slept through the night, 6.75 hours.  No acute events overnight.  Zero fluid intake.  Pt on elopement, fall, assault & cheeking precautions; no roommate status.  15 minute checks remain ongoing.  Will continue to monitor and support plan of care.

## 2021-11-01 NOTE — PROGRESS NOTES
Brief Medicine Note    Contacted by nursing regarding patient c/o choking after swallowing gatorade and low grade temp 99F and intermittent lower O2 sats in the 90-91% range on RA    Patient states that he is feeling tired and remarks multiple times about the number of days he has been in the hospital and indeed it is a protracted hospitalization    Patient says he does not want me to take a listen.     Today's vital signs, medications, and nursing notes were reviewed. Labs reviewed.     BP 92/65 (BP Location: Right arm)   Pulse 100   Temp 99.6  F (37.6  C) (Oral)   Resp 20   Ht 1.829 m (6')   Wt 87.4 kg (192 lb 9.6 oz)   SpO2 95%   BMI 26.12 kg/m    General: A&O. NAD. Supine in bed  Chest: nonlabored breathing  HEENT: NC AT      A/P:  Hypotension  Poor po intake  bp is now normotensive  - repeat LR bolus 1L x 1 today  - continue to to pursue goal of patient consuming 75 to 100% of meals and continue supplements as ordered per RD  - encourage po fluids    Low O2 sats  COPD  Given COPD 90-91% is reasonable provided patient remains asymptomatic  - monitor  - patient refused exam  - patient refused chest x-ray    Medicine will follow peripherally for UA, vitals-- Please call with questions or concerns    Milagros Ray PA-C  [facility]  Contact information available via [facility] Paging/Directory

## 2021-11-01 NOTE — PLAN OF CARE
"  Problem: Malnutrition  Goal: Improved Nutritional Intake  Outcome: Improving     Intake: approximately 25% of dinner meal  472 mL of fluid  -Intake requires much prompting and encouragement    Pt reports he was not incontinent of urine. Pt continues to utilize a brief.    Placed UA cup in pt bathroom. He states that he will provide a urine sample \"tomorrow morning.\"    Pt isolated to his room for the evening. Pt encouraged to spend time in the milieu but he declined. When asked how he is feeling, pt responds, \"The voices in my head are making it difficult to sleep\"; causing pt anxiety. Provided pt with risperidone prn.     Pt encouraged to shower but he declined, stating \"I will do that tomorrow morning.\"     Currently denies SI/SIB. No additional concerns at this time. Will continue to assess and offer support.   "

## 2021-11-01 NOTE — PLAN OF CARE
"  Problem: Cognitive Impairment (Psychotic Signs/Symptoms)  Goal: Optimal Cognitive Function (Psychotic Signs/Symptoms)  Outcome: No Change   This morning patient's O2 sats while lying down were 90%. Staff then had patient sit up and take some deep breaths and O2 sat was 97% with a pulse of 113. For breakfast staff fed patient 3 small bites of Bengali toast with syrup. Patient drank one whole Ensure and 170cc's of Gatorade. At lunch time patient voided 100ccs of dark franko urine. Patient took one bite of pepperoni pizza and then he said \"I'm not hungry. Drank 160ccs of Ensure  and 190ccs of Gatorade. Writer has encouraged patient to eat and drink several times throughout the shift and most of the time patient will yell and tell writer to leave him alone. Declined to walk down [address] to [facility]. Declined to change scrubs and pull ups. Has been stating \"I'm tired.\" Temperature this afternoon was 99.6 oral-123-20. O2 sat lying down was 91-92%. Patient sat up and cleared throat and O2 sat was 95%. Patient's face appears flushed. IM was notified and came to see patient. Patient c/o/yelled several times about the fact he is still here and shouldn't be. Patient said he was depressed and anxious. Spoke about living in [place]. Patient turns self in bed frequently throughout shift.   "

## 2021-11-01 NOTE — PROGRESS NOTES
"[facility], [place]   Psychiatric Progress Note  Hospital Day: 124        Interim History:   The patient's care was discussed with the treatment team during the daily team meeting and/or staff's chart notes were reviewed.  Staff report patient has continued to isolate to room and refuse to leave his bed, getting up only to use restroom, declined to go out into the milieu, taking medications as prescribed, internal medicine gave patient IV fluids on Friday and have since signed off. Pt having ongoing malaise, refusing to leave room, today O2 sats are low, IM being notified by RN.     Upon interview pt was lying in bed, awake, attempted to encourage patient to come out into lounge and meet with writer elsewhere, he stated \"no, I'm tired\". Expressed concern about how he has not been getting out of bed, he stated \"I have been here too long, my commitment is up, I should leave\". Inquired as to where he believes he is and he stated \"[facility]\". Reoriented pt he is on the psychiatric unit in the hospital and again attempted to encourage him to get out of bed, he stated \"I'm too tired, all the people are in my head\". Denies SI, HI, VH. Tolerating medications, denies noticing TD movements. Denies feeling uncomfortable or having any pain anywhere. No additional concerns.          Medications:       aspirin  81 mg Oral Daily     atorvastatin  80 mg Oral At Bedtime     cholecalciferol  1,250 mcg Oral Q7 Days     clotrimazole  1 lozenge Buccal 5x Daily     cloZAPine  350 mg Oral At Bedtime     levothyroxine  88 mcg Oral Daily     memantine  5 mg Oral BID     metoprolol tartrate  25 mg Oral BID     salmeterol  1 puff Inhalation BID     vitamin D3  50 mcg Oral Daily          Allergies:     Allergies   Allergen Reactions     Ibuprofen      Latex           Labs:     No results found for this or any previous visit (from the past 48 hour(s)).       Psychiatric Examination:     BP (!) 89/60   " "Pulse 97   Temp 98.7  F (37.1  C) (Oral)   Resp 16   Ht 1.829 m (6')   Wt 87.4 kg (192 lb 9.6 oz)   SpO2 97%   BMI 26.12 kg/m    Weight is 192 lbs 9.6 oz  Body mass index is 26.12 kg/m .    Orthostatic Vitals       Most Recent      Lying Orthostatic /72 11/01 1425    Lying Orthostatic Pulse (bpm) 123 11/01 1425        Appearance: sleeping, poor hygiene, no distress  Attitude: less cooperative   Eye Contact:  poor  Mood:  \"tired\" continues to have periods of irritability   Affect:  mood congruent and intensity is flat  Speech:  soft volume, raspy ,dysarthric; did not yell today  Language: fluent and intact in English  Psychomotor, Gait, Musculoskeletal:  no evidence of dystonia, or tics, continues to have movements of buccolingual dyskinesia present  Thought Process: disorganized  Associations:  no loose associations  Thought Content:  denies SI and HI; continues to be delusional and observed responding to internal stimuli; endorses AH, denies VH   Insight:  limited  Judgement:  limited  Oriented to:  person  Attention Span and Concentration:  limited  Recent and Remote Memory:  limited  Fund of Knowledge:  delayed    Clinical Global Impressions  First:  Considering your total clinical experience with this particular patient population, how severe are the patient's symptoms at this time?: 7 (07/01/21 0630)  Compared to the patient's condition at the START of treatment, this patient's condition is: 4 (07/01/21 0630)  Most recent:  Considering your total clinical experience with this particular patient population, how severe are the patient's symptoms at this time?: 6 (11/01/21 1609)  Compared to the patient's condition at the START of treatment, this patient's condition is: 5 (11/01/21 1609)         Precautions:     Behavioral Orders   Procedures     Assault precautions     Cheeking Precautions (behavioral units)     Patient Observed swallowing PO medications; Patient asked to drink water after swallowing " medication; Patient in Staff line of sight for 15 minutes after medication given; Mouth checks after PO administration (patient asked to open mouth and stick out their tongue).     Code 1     Code 2     With Security for any imaging/testing needed.     Elopement precautions     Fall precautions     Routine Programming     As clinically indicated     Single Room     Status 15     Every 15 minutes.          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   Stimulant use disorder, in remission.   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 Is currently under commitment with Yanez recently amended to include Clozaril. While at [facility] was noted to have ongoing agitation and frequently attempting to elope from unit requiring 1:1 staffing for safety. He was started on 1:1 staff upon transfer, this was discontinued as patient has been more cooperative without elopement attempts. IM consult completed on admission and continued to follow due to elevated LFTs. Haldol and VPA discontinued due to LFTs. Cross titration completed from risperidone to clozaril after Yanez amended. EPSE/TD movements noted, have appeared stable over past few weeks. Patient has continued to have disorganization and active  psychosis symptoms which appear to be at patients baseline. Team continues to work on disposition which barriers include patient has no guardian or next of kin willing to act as surrogate decision maker, see CTC notes for complete details. Patient starting to have increasing symptoms noted week of 9/20, clozapine level ordered and noted to be lower than previous, have added cheeking precautions and increased clozapine dose on 9/24 and moved to split dosing given increasing afternoon agitation. Pt having decreased appetite, increased fatigue over weekend of 10/9-10/10, IM consult placed 10/11, KUB Xray showed large stool burden, bowel regimen modified. Pt was incontinent of stool evening of 10/13. Consolidated clozapine dose to bedtime given concern for daytime sedation with split dosing. Ongoing malaise noted starting 10/11, internal medicine has been contacted periodically.     Psychiatric treatment/inteventions:  Medications:   -conitnue clozapine 350mg at bedtime, reduced over the past few weeks due to ongoing sedation/fatigue and now prolonged QTc; continue to monitor movements consistent with TD  -continue cheeking precautions, started 9/27 given noted decompensation in recent weeks and lower clozapine level   -continue memantine at 5mg daily for neurocognitive disorder, dose was up to 10mg BID and have been reducing dose, may taper off as patient does not appear to have benefit and reporting oversedation and to reduce anticholinergic load  -continue risperdal 1mg Q6H PRN agitation   -continue lorazepam 0.5-1.0mg Q4H PRN anxiety or severe agitation    -discontinued benztropine 0.25mg BID on 10/21, tapered off to reduce anticholinergic load  -discontinued rivastigmine patch on 10/21,pt did not seemed to have benefit and to reduce anticholinergic load    Laboratory/Imaging: weekly WBC and ANC for clozapine monitoring continues ANC 10/29 was 3.9, additional labs ordered per IM     Patient will be treated in  therapeutic milieu with appropriate individual and group therapies as described.     Medical treatment/interventions:  Medical concerns:   IM re-consulted due to return of fever and ongoing malaise 10/22, additional labs ordered along with Echo. Also placed Neurology consult to rule out any possibility of seizure activity. Neurology did not feel any further work up warranted. Pt continued to have poor intake, now with tachycardia, dark urine and hypotension and possible thrush, also concern for bed sores and loose stools. Pt was given 1L IVF on 10/29, BP improved, IM signed off. Having low O2 sats, ongoing malaise, IM contacted by RN. Will also work to get patient medical bed.     Per IM progress note today:  Brief Medicine Note     Contacted by nursing regarding patient c/o choking after swallowing gatorade and low grade temp 99F and intermittent lower O2 sats in the 90-91% range on RA     Patient states that he is feeling tired and remarks multiple times about the number of days he has been in the hospital and indeed it is a protracted hospitalization     Patient says he does not want me to take a listen.      Today's vital signs, medications, and nursing notes were reviewed. Labs reviewed.      BP 92/65 (BP Location: Right arm)   Pulse 100   Temp 99.6  F (37.6  C) (Oral)   Resp 20   Ht 1.829 m (6')   Wt 87.4 kg (192 lb 9.6 oz)   SpO2 95%   BMI 26.12 kg/m    General: A&O. NAD. Supine in bed  Chest: nonlabored breathing  HEENT: NC AT        A/P:  Hypotension  Poor po intake  bp is now normotensive  - repeat LR bolus 1L x 1 today  - continue to to pursue goal of patient consuming 75 to 100% of meals and continue supplements as ordered per RD  - encourage po fluids     Low O2 sats  COPD  Given COPD 90-91% is reasonable provided patient remains asymptomatic  - monitor  - patient refused exam  - patient refused chest x-ray     Medicine will follow peripherally for UA, vitals-- Please call with questions or  concerns     Milagros Ray PA-C        This note was created by undersigned using a Dragon dictation system. All typing errors or contextual distortion are unintentional and software inherent.     Disposition Plan   Reason for ongoing admission: is unable to care for self due to severe psychosis or mariusz and neurocognitive disorder   Discharge location: D, likely locked NH facility , unable to progress on discharge planning due to guardianship not being in place, see CTC notes  Discharge Medications: not ordered  Follow-up Appointments: not scheduled  Legal Status: Full MI commitment and Yanez     Entered by: Jeanette Dobbins on 11/1/2021 at 7:19 AM

## 2021-11-01 NOTE — PLAN OF CARE
Assessment/Intervention/Current Symtoms and Care Coordination  -Chart review  -Pre round meeting with team  -Rounded with team, addressed patient needs/concerns  -Post round meeting with team  Current Symptoms include the following: Psychosis, disorganization and confusion.        Discharge Plan or Goal  Considerations include: locked behavorial unit in nursing home     Barriers to Discharge  The pt does not have a guardian and a guardian has not been found for this pt. Nursing homes are requiring a guardian.     Referral Status  locked behavioral unit in nursing home  no new outside referrals were made today      Legal Status  Patient is under MI commitment in [facility]

## 2021-11-02 PROCEDURE — 250N000013 HC RX MED GY IP 250 OP 250 PS 637: Performed by: PSYCHIATRY & NEUROLOGY

## 2021-11-02 PROCEDURE — 99233 SBSQ HOSP IP/OBS HIGH 50: CPT | Performed by: PSYCHIATRY & NEUROLOGY

## 2021-11-02 PROCEDURE — 250N000013 HC RX MED GY IP 250 OP 250 PS 637: Performed by: PHYSICIAN ASSISTANT

## 2021-11-02 PROCEDURE — 124N000002 HC R&B MH UMMC

## 2021-11-02 PROCEDURE — 258N000003 HC RX IP 258 OP 636: Performed by: PHYSICIAN ASSISTANT

## 2021-11-02 RX ADMIN — CLOTRIMAZOLE 1 LOZENGE: 10 LOZENGE ORAL; TOPICAL at 18:17

## 2021-11-02 RX ADMIN — METOPROLOL TARTRATE 25 MG: 25 TABLET, FILM COATED ORAL at 22:33

## 2021-11-02 RX ADMIN — MEMANTINE 5 MG: 5 TABLET ORAL at 10:12

## 2021-11-02 RX ADMIN — MEMANTINE 5 MG: 5 TABLET ORAL at 22:33

## 2021-11-02 RX ADMIN — CLOTRIMAZOLE 1 LOZENGE: 10 LOZENGE ORAL; TOPICAL at 10:11

## 2021-11-02 RX ADMIN — CLOZAPINE 350 MG: 100 TABLET ORAL at 22:31

## 2021-11-02 RX ADMIN — METOPROLOL TARTRATE 25 MG: 25 TABLET, FILM COATED ORAL at 10:13

## 2021-11-02 RX ADMIN — CLOTRIMAZOLE 1 LOZENGE: 10 LOZENGE ORAL; TOPICAL at 22:33

## 2021-11-02 RX ADMIN — Medication 50 MCG: at 10:10

## 2021-11-02 RX ADMIN — SODIUM CHLORIDE, POTASSIUM CHLORIDE, SODIUM LACTATE AND CALCIUM CHLORIDE 1000 ML: 600; 310; 30; 20 INJECTION, SOLUTION INTRAVENOUS at 16:16

## 2021-11-02 RX ADMIN — SALMETEROL XINAFOATE 1 PUFF: 50 POWDER, METERED ORAL; RESPIRATORY (INHALATION) at 10:11

## 2021-11-02 RX ADMIN — CLOTRIMAZOLE 1 LOZENGE: 10 LOZENGE ORAL; TOPICAL at 14:05

## 2021-11-02 RX ADMIN — ASPIRIN 81 MG CHEWABLE TABLET 81 MG: 81 TABLET CHEWABLE at 10:13

## 2021-11-02 RX ADMIN — LEVOTHYROXINE SODIUM 88 MCG: 88 TABLET ORAL at 10:12

## 2021-11-02 RX ADMIN — ATORVASTATIN CALCIUM 80 MG: 80 TABLET, FILM COATED ORAL at 22:33

## 2021-11-02 ASSESSMENT — ACTIVITIES OF DAILY LIVING (ADL)
ORAL_HYGIENE: PROMPTS
ORAL_HYGIENE: INDEPENDENT;PROMPTS
HYGIENE/GROOMING: PROMPTS
HYGIENE/GROOMING: PROMPTS;WITH ASSISTANCE
DRESS: PROMPTS
DRESS: INDEPENDENT;PROMPTS

## 2021-11-02 ASSESSMENT — MIFFLIN-ST. JEOR: SCORE: 1673.58

## 2021-11-02 NOTE — PLAN OF CARE
.Assessment/Intervention/Current Symtoms and Care Coordination  -Chart review  -Pre round meeting with team  -Rounded with team, addressed patient needs/concerns  -Post round meeting with team  Current Symptoms include the following: Psychosis      Pt is failing to thrive. A medical work- up as yield no findings.  Weight today was 178.  Pt was steady in the 196 to 198 range until October 12 and then weight started declining at this point. So this is a 20 lb weight loss from admission.    Team is considering moving the pt to 3B thinking that  a change of environment would be beneficial to the pt.  I have informed the TCM at [facility] of the pending transfer.    There have been no family visits.      Discharge Plan or Goal  Considerations include:  locked nursing home      Barriers to Discharge  There is no guardian and attempts to find a guardian have been unsuccessful.      Referral Status  Memory care and Hospice    Legal Status  Patient is under MI commitment in [facility]

## 2021-11-02 NOTE — PROGRESS NOTES
CLINICAL NUTRITION SERVICES - REASSESSMENT NOTE     Nutrition Prescription    RECOMMENDATIONS FOR MDs/PROVIDERS TO ORDER:  Consider initiation of Enteral Nutrition if appropriate based on patient's goals of care    Malnutrition Status:    Severe malnutrition in the context of acute illness    Recommendations already ordered by Registered Dietitian (RD):  Medical food supplement therapy- Trial Special K bar once daily with additional instruction that RN may order up to three daily if patient will eat them, reduce Ensure Enlive to bid with meals    Future/Additional Recommendations:  Monitor intake, goals of care, tolerance of supplements     EVALUATION OF THE PROGRESS TOWARD GOALS   Diet: Regular  Supplements: Ensure Enlive tid with meals, Magic Cup bid at lunch and dinner  Intake: very poor, sometimes will take a Magic Cup and other times won't. Pt is simply disinterested   Pt on calorie counts, but patient is eating almost nothing, calorie count information not very useful aside from confirming that patient is meeting <50% of needs      NEW FINDINGS   Had a conversation with patient's RN today. Pt irritable, listless, fatigued doesn't want to get out of bed. Pt has no family/friends to bring him foods and he has been hospitalized for many months and was actually at [facility] for a few months prior to transfer here.      Weight: 6.3 kg (7%) weight loss over 3 weeks, 8.2 kg (9%) over 5 weeks  Vitals:    06/30/21 2025 07/04/21 0836 07/15/21 0837 07/22/21 0841   Weight: 89.1 kg (196 lb 8 oz) 87.5 kg (192 lb 14.4 oz) 89.3 kg (196 lb 12.8 oz) 89.2 kg (196 lb 9.6 oz)    08/08/21 0932 08/10/21 0753 08/12/21 0849 08/18/21 0700   Weight: 89.8 kg (197 lb 14.4 oz) 89.9 kg (198 lb 1.6 oz) 89.1 kg (196 lb 8 oz) 89.4 kg (197 lb 1.6 oz)    08/29/21 0800 09/05/21 0853 09/07/21 0835 09/16/21 0816   Weight: 89.5 kg (197 lb 4.8 oz) 90.1 kg (198 lb 9.6 oz) 90 kg (198 lb 6.4 oz) 89.1 kg (196 lb 6.4 oz)    09/23/21 0838 09/28/21 0700  10/12/21 0824 11/02/21 0924   Weight: 89.1 kg (196 lb 8 oz) 89.3 kg (196 lb 14.4 oz) 87.4 kg (192 lb 9.6 oz) 81.1 kg (178 lb 11.2 oz)     Labs: AST 71, CRP 27, lytes WNL    GI: No GI complains noted    Meds: reviewed    MALNUTRITION  % Intake: </= 50% for >/= 5 days (severe)  % Weight Loss: > 5% in 1 month (severe)  Subcutaneous Fat Loss: Unable to assess  Muscle Loss: Unable to assess  Fluid Accumulation/Edema: None noted  Malnutrition Diagnosis: Severe malnutrition in the context of acute illness    Previous Goals   Patient to consume % of nutritionally adequate meal trays TID, or the equivalent with supplements/snacks.  Evaluation: Not met    Previous Nutrition Diagnosis  Inadequate oral intake of unclear etiology (GI, menu fatigue) as evidenced by very inconsistent intake and weight loss    Evaluation: No change    CURRENT NUTRITION DIAGNOSIS  Inadequate oral intake of unclear etiology (failure to thrive, menu fatigue) as evidenced by poor intake and severe weight loss      INTERVENTIONS  Implementation  Medical food supplement therapy- Trial Special K bar once daily with additional instruction that RN may order up to three daily if patient will eat them, reduce Ensure Enlive to bid with meals    Goals  Patient to consume % of nutritionally adequate meal trays TID, or the equivalent with supplements/snacks.    Monitoring/Evaluation  Progress toward goals will be monitored and evaluated per protocol.    Najma Suarez RD, LD  ICU/5A/OB/Mental Health Pager (M-F): [phone]  On Call Pager (weekends only): [phone]

## 2021-11-02 NOTE — PLAN OF CARE
Patient has spent the entire shift  in his room sleeping or napping. Patient continues to refuse to eat dinner or snack. Drank approximately 16 ounces of fluids with encouragement. Had a 1,000 mL lactated ringers bolus. Still frustrated about still being here. Adl's are poor but refused to get out of bed.    Patient evaluation continues. Assessed mood,anxiety,thoughts and behavior.     Patient gradually progressing towards goals.    Patient is encouraged to participate in groups and assisted to develop healthy coping skills.     VS reviewed: BP 98/69   Pulse 99   Temp 98.8  F (37.1  C) (Oral)   Resp 16   Ht 1.829 m (6')   Wt 87.4 kg (192 lb 9.6 oz)   SpO2 95%   BMI 26.12 kg/m      Length of stay: 124    Refer to daily team meeting notes for individualized plan of care. Nursing will continue to assess.

## 2021-11-02 NOTE — PROGRESS NOTES
BEH Med flyer and Lynn (patients nurse) tried to perform a head to toe skin assessment. Patient is constantly laying in the bed, not eating or drinking much, and barely ambulates. This can make the patient more prone to having bed sores/skin breakdown. Patient let us assess his arms, legs,back, and torso but not his genital area.

## 2021-11-02 NOTE — PROGRESS NOTES
"[facility], [place]   Psychiatric Progress Note  Hospital Day: 125        Interim History:   The patient's care was discussed with the treatment team during the daily team meeting and/or staff's chart notes were reviewed.  Staff report patient continued to isolate to bed, only up to use bathroom, intake remains poor, received 1L IV LR, declined snacks or to eat dinner. Skin check completed this AM without evidence of skin breakdown or wounds.     Patient was lying in bed, reported mood is \"tired\", states he has been in the hospital too long. Denies SI or HI, reports AH which have not changed, denied VH. Writer encouraged patient to sit up but he declined, denies having any pain, expressed concern about lack of intake and not leaving room, he again stated his commitment is up and has been here too long. He closed eyes and stopped responding to writer.     RN and psych associate went into room and brought wheel chair as some maintenance needed to be done on his room, he got up with staff assist, appeared a bit unsteady on feet, sat in wheelchair and brought out to dining room with breakfast, observed to eat 25% of meal. He remained in milieu for an hour then returned to room and went back to bed, staff able to obtain vitals including weight.     Writer visited patient again in room, shared concern about observed unsteadiness on feet when walking and needing to leave his room and get activity or else he can become deconditioned, he again stated he was tired and closed eyes and no longer engaged in interview. Writer shared staff is working on getting him a medical bed and he will likely need to change rooms.          Medications:       aspirin  81 mg Oral Daily     atorvastatin  80 mg Oral At Bedtime     cholecalciferol  1,250 mcg Oral Q7 Days     clotrimazole  1 lozenge Buccal 5x Daily     cloZAPine  350 mg Oral At Bedtime     levothyroxine  88 mcg Oral Daily     memantine  5 mg Oral BID " "    metoprolol tartrate  25 mg Oral BID     salmeterol  1 puff Inhalation BID     vitamin D3  50 mcg Oral Daily          Allergies:     Allergies   Allergen Reactions     Ibuprofen      Latex           Labs:     No results found for this or any previous visit (from the past 48 hour(s)).       Psychiatric Examination:     BP 98/69   Pulse 99   Temp 98.8  F (37.1  C) (Oral)   Resp 16   Ht 1.829 m (6')   Wt 87.4 kg (192 lb 9.6 oz)   SpO2 95%   BMI 26.12 kg/m    Weight is 192 lbs 9.6 oz  Body mass index is 26.12 kg/m .    Orthostatic Vitals       Most Recent      Lying Orthostatic /72 11/01 1425    Lying Orthostatic Pulse (bpm) 123 11/01 1425        Appearance: sleeping, poor hygiene, no distress  Attitude:  uncooperative   Eye Contact:  poor  Mood:  \"tired\" continues to have periods of irritability   Affect:  mood congruent and intensity is flat  Speech:  soft volume, raspy, dysarthric  Language: fluent and intact in English  Psychomotor, Gait, Musculoskeletal:  no evidence of dystonia, or tics, continues to have movements of buccolingual dyskinesia present intermittently   Thought Process: disorganized  Associations:  no loose associations  Thought Content:  denies SI and HI; continues to be delusional and observed responding to internal stimuli; endorses AH, denies VH   Insight:  limited  Judgement:  limited  Oriented to:  person  Attention Span and Concentration:  limited  Recent and Remote Memory:  limited  Fund of Knowledge:  delayed    Clinical Global Impressions  First:  Considering your total clinical experience with this particular patient population, how severe are the patient's symptoms at this time?: 7 (07/01/21 0630)  Compared to the patient's condition at the START of treatment, this patient's condition is: 4 (07/01/21 0630)  Most recent:  Considering your total clinical experience with this particular patient population, how severe are the patient's symptoms at this time?: 6 (11/01/21 " 1609)  Compared to the patient's condition at the START of treatment, this patient's condition is: 5 (11/01/21 1609)         Precautions:     Behavioral Orders   Procedures     Assault precautions     Cheeking Precautions (behavioral units)     Patient Observed swallowing PO medications; Patient asked to drink water after swallowing medication; Patient in Staff line of sight for 15 minutes after medication given; Mouth checks after PO administration (patient asked to open mouth and stick out their tongue).     Code 1     Code 2     With Security for any imaging/testing needed.     Elopement precautions     Fall precautions     Routine Programming     As clinically indicated     Single Room     Status 15     Every 15 minutes.          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   Stimulant use disorder, in remission.   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 after a nearly year-long stay. Is currently under commitment with Yanez recently amended to include Clozaril. While at [facility] was noted to have ongoing agitation and frequently attempting to elope from unit requiring 1:1 staffing for safety. He was started on 1:1 staff upon transfer, this was discontinued as patient has  been more cooperative without elopement attempts. IM consult completed on admission and continued to follow due to elevated LFTs. Haldol and VPA discontinued due to LFTs. Cross titration completed from risperidone to clozaril after Yanez amended. EPSE/TD movements noted, have appeared stable over past few weeks. Patient has continued to have disorganization and active psychosis symptoms which appear to be at patients baseline. Team continues to work on disposition which barriers include patient has no guardian or next of kin willing to act as surrogate decision maker, see CTC notes for complete details. Patient starting to have increasing symptoms noted week of 9/20, clozapine level ordered and noted to be lower than previous, have added cheeking precautions and increased clozapine dose on 9/24 and moved to split dosing given increasing afternoon agitation. Pt having decreased appetite, increased fatigue over weekend of 10/9-10/10, IM consult placed 10/11, KUB Xray showed large stool burden, bowel regimen modified. Pt was incontinent of stool evening of 10/13. Consolidated clozapine dose to bedtime given concern for daytime sedation with split dosing. Ongoing malaise noted starting 10/11, internal medicine has been contacted periodically.     Psychiatric treatment/inteventions:  Medications:   -conitnue clozapine 350mg at bedtime, reduced over the past few weeks due to ongoing sedation/fatigue and now prolonged QTc; continue to monitor movements consistent with TD  -continue cheeking precautions, started 9/27 given noted decompensation in recent weeks and lower clozapine level   -continue memantine at 5mg daily for neurocognitive disorder, dose was up to 10mg BID and have been reducing dose, may taper off as patient does not appear to have benefit and reporting oversedation and to reduce anticholinergic load  -continue risperdal 1mg Q6H PRN agitation   -continue lorazepam 0.5-1.0mg Q4H PRN anxiety or severe  agitation    -discontinued benztropine 0.25mg BID on 10/21, tapered off to reduce anticholinergic load  -discontinued rivastigmine patch on 10/21,pt did not seemed to have benefit and to reduce anticholinergic load    Laboratory/Imaging: weekly WBC and ANC for clozapine monitoring continues ANC 10/29 was 3.9, will order repeat EKG for Friday to check QTc     Patient will be treated in therapeutic milieu with appropriate individual and group therapies as described.     Medical treatment/interventions:  Medical concerns:   IM re-consulted due to return of fever and ongoing malaise 10/22, additional labs ordered along with Echo. Also placed Neurology consult to rule out any possibility of seizure activity. Neurology did not feel any further work up warranted. Pt continued to have poor intake, now with tachycardia, dark urine and hypotension and possible thrush, also concern for bed sores and loose stools. Pt was given 1L IVF on 10/29, BP improved, IM signed off. Having low O2 sats, ongoing malaise, IM contacted by RN. Will also work to get patient medical bed. RN checking with IM 11/2 to see if IV should be pulled or if patient requires more fluids.     Per IM progress note 11/1:  Brief Medicine Note     Contacted by nursing regarding patient c/o choking after swallowing gatorade and low grade temp 99F and intermittent lower O2 sats in the 90-91% range on RA     Patient states that he is feeling tired and remarks multiple times about the number of days he has been in the hospital and indeed it is a protracted hospitalization     Patient says he does not want me to take a listen.      Today's vital signs, medications, and nursing notes were reviewed. Labs reviewed.      BP 92/65 (BP Location: Right arm)   Pulse 100   Temp 99.6  F (37.6  C) (Oral)   Resp 20   Ht 1.829 m (6')   Wt 87.4 kg (192 lb 9.6 oz)   SpO2 95%   BMI 26.12 kg/m    General: A&O. NAD. Supine in bed  Chest: nonlabored breathing  HEENT: NC  AT        A/P:  Hypotension  Poor po intake  bp is now normotensive  - repeat LR bolus 1L x 1 today  - continue to to pursue goal of patient consuming 75 to 100% of meals and continue supplements as ordered per RD  - encourage po fluids     Low O2 sats  COPD  Given COPD 90-91% is reasonable provided patient remains asymptomatic  - monitor  - patient refused exam  - patient refused chest x-ray     Medicine will follow peripherally for UA, vitals-- Please call with questions or concerns     Milagros Ray PA-C        This note was created by undersigned using a Dragon dictation system. All typing errors or contextual distortion are unintentional and software inherent.     Disposition Plan   Reason for ongoing admission: is unable to care for self due to severe psychosis or mariusz and neurocognitive disorder   Discharge location: D, likely locked NH facility , unable to progress on discharge planning due to guardianship not being in place, see CTC notes  Discharge Medications: not ordered  Follow-up Appointments: not scheduled  Legal Status: Full MI commitment and Yanez     Entered by: Jeanette Dobbins on 11/2/2021 at 6:29 AM

## 2021-11-02 NOTE — PLAN OF CARE
"  Problem: Malnutrition  Goal: Improved Nutritional Intake  Outcome: No Change       Patient would benefit from having a medical bed.      Patient has a weight loss of 18.14#.  On 9/28/21 patient weighed 196.48 #.  Patients weight today is 178.34#.  Patient has a poor oral intake.  Patient has received fluid bolus d/t poor intake and low BP.  Patient remains in bed most of the shift.  Patient refuses to get out of bed.  States, \"I am tired.\"      Patient appears gaunt.  Skin assessment is completed by the Behavioral Medical Flyer.  Patient skin is intact.  Patient is encouraged to come out to the lounge.  A wheel chair is provided.  Patient appears to have weakness in the lower extremities.  Patient required assist of two as he transferred from the weight scale to the wheelchair.      Blankets are folded and placed under the patients heels bilaterally.  Patient is encouraged to sit up and change positions frequently. I's and O's are recorded and patient is encouraged to increase oral intake.  Continue to monitor.   "

## 2021-11-02 NOTE — PLAN OF CARE
Night Shift Summary (11/1/21 into 11/02/21)    Pt in bed sleeping at start of shift. Breathing quiet and unlabored. Appears to have slept 7 hours.    Assault, Elopement, Cheeking, and Fall precautions in addition to Single Room order; any related events noted above.     Will continue to monitor and assess.       Problem: Behavioral Health Plan of Care  Goal: Absence of New-Onset Illness or Injury  Outcome: Improving     Problem: Sleep Disturbance  Goal: Adequate Sleep/Rest  Outcome: Improving

## 2021-11-03 PROCEDURE — 250N000013 HC RX MED GY IP 250 OP 250 PS 637: Performed by: PSYCHIATRY & NEUROLOGY

## 2021-11-03 PROCEDURE — 250N000013 HC RX MED GY IP 250 OP 250 PS 637: Performed by: PHYSICIAN ASSISTANT

## 2021-11-03 PROCEDURE — 124N000002 HC R&B MH UMMC

## 2021-11-03 RX ADMIN — MEMANTINE 5 MG: 5 TABLET ORAL at 21:02

## 2021-11-03 RX ADMIN — RISPERIDONE 1 MG: 1 TABLET, ORALLY DISINTEGRATING ORAL at 14:05

## 2021-11-03 RX ADMIN — MEMANTINE 5 MG: 5 TABLET ORAL at 08:07

## 2021-11-03 RX ADMIN — CLOTRIMAZOLE 1 LOZENGE: 10 LOZENGE ORAL; TOPICAL at 08:08

## 2021-11-03 RX ADMIN — METOPROLOL TARTRATE 25 MG: 25 TABLET, FILM COATED ORAL at 08:08

## 2021-11-03 RX ADMIN — CLOTRIMAZOLE 1 LOZENGE: 10 LOZENGE ORAL; TOPICAL at 14:05

## 2021-11-03 RX ADMIN — CLOTRIMAZOLE 1 LOZENGE: 10 LOZENGE ORAL; TOPICAL at 18:19

## 2021-11-03 RX ADMIN — CLOZAPINE 350 MG: 100 TABLET ORAL at 21:02

## 2021-11-03 RX ADMIN — SALMETEROL XINAFOATE 1 PUFF: 50 POWDER, METERED ORAL; RESPIRATORY (INHALATION) at 08:09

## 2021-11-03 RX ADMIN — METOPROLOL TARTRATE 25 MG: 25 TABLET, FILM COATED ORAL at 21:02

## 2021-11-03 RX ADMIN — Medication 50 MCG: at 08:08

## 2021-11-03 RX ADMIN — SALMETEROL XINAFOATE 1 PUFF: 50 POWDER, METERED ORAL; RESPIRATORY (INHALATION) at 21:01

## 2021-11-03 RX ADMIN — ATORVASTATIN CALCIUM 80 MG: 80 TABLET, FILM COATED ORAL at 21:02

## 2021-11-03 RX ADMIN — CLOTRIMAZOLE 1 LOZENGE: 10 LOZENGE ORAL; TOPICAL at 21:01

## 2021-11-03 RX ADMIN — LEVOTHYROXINE SODIUM 88 MCG: 88 TABLET ORAL at 08:08

## 2021-11-03 RX ADMIN — ASPIRIN 81 MG CHEWABLE TABLET 81 MG: 81 TABLET CHEWABLE at 08:11

## 2021-11-03 ASSESSMENT — ACTIVITIES OF DAILY LIVING (ADL)
ORAL_HYGIENE: TOTAL CARE
HYGIENE/GROOMING: PROMPTS
ORAL_HYGIENE: PROMPTS
LAUNDRY: UNABLE TO COMPLETE
HYGIENE/GROOMING: PROMPTS
LAUNDRY: UNABLE TO COMPLETE
DRESS: PROMPTS
DRESS: WITH ASSISTANCE

## 2021-11-03 NOTE — PROGRESS NOTES
Beh Med flyer was able to auscultate patients lungs anterior was he was lying down. Lungs sounds clear bilaterally.  Patient needs to ambulate out of his bed more, to decrease the chances of developing pneumonia.

## 2021-11-03 NOTE — PLAN OF CARE
Problem: Malnutrition  Goal: Improved Nutritional Intake  Outcome: No Change       Patient has poor oral intake.  Nutrition called to update the unit on patient food offerings.      Internal Medicine is called and updated on patient status.  New orders are received.  Behavioral Medical Flyer is at the patient bedside administrating IV bolus fluids. Patient tolerated the bolus well.  Patient requires a medical bed to allow for position changes and raising the HOB >30 degrees. Patient is at risk for skin breakdown. Staff continues to encourage patient oral intake.  Patient sat in the lounge for >1 hour as his room was cleaned and medical bed is placed.  Patient tolerated the room transfer well.  Continue with plan of care.

## 2021-11-03 NOTE — PLAN OF CARE
"Night Shift Summary (11/2/21 into 11/03/21)    Pt in bed sleeping at start of shift. Breathing quiet and unlabored. Unwitnessed fall at 0625. Staff heard a noise from down the cruz. Entered room and found pt sitting on the floor at the foot of the platform bed in his room with his pants prison down. Pt sleeps in the medical bed. Denies hitting head. Stated he fell on his bottom when he tried to sit on the edge of the platform bed, thinking it was the toilet. Vitals WNL. Denies pain. Denies dizziness. Endorses lightheadedness. Given water per request but didn't drink despite encouragement. Stated, \"I will in the morning.\" Irritable and back to bed.   Of note, pt's dinner tray was still in his room. Had not eaten any of the food or drank any of the fluids.     On-call provider notified and requested that this information get passed on to day shift provider. Writer recommends that the platform bed in pt's room be removed to reduce confusion during the night when independently ambulating to bathroom.     Appears to have slept 7 hours as of 0615.    Assault, Elopement, Cheeking and Fall precautions in addition to No Roommate order; any related events noted above.     Will continue to monitor and assess.       Problem: Cognitive Impairment (Psychotic Signs/Symptoms)  Goal: Optimal Cognitive Function (Psychotic Signs/Symptoms)  Outcome: Declining     Problem: Fall Injury Risk  Goal: Absence of Fall and Fall-Related Injury  Outcome: Declining  Intervention: Promote Injury-Free Environment  Recent Flowsheet Documentation  Taken 11/3/2021 0000 by Nan Corona RN  Safety Promotion/Fall Prevention:    clutter free environment maintained    nonskid shoes/slippers when out of bed       "

## 2021-11-03 NOTE — PLAN OF CARE
Assessment/Intervention/Current Symtoms and Care Coordination  -Chart review  -Pre round meeting with team  -Rounded with team, addressed patient needs/concerns  -Post round meeting with team  Current Symptoms include the following: Psychosis      This is day 126 of this admission.  Pt is being taken out of his room via wheelchair to increase physical activity and decrease social isolation.  Pt was changed into a medical bed yesterday.   Pt is not eating or drinking and has lost 20 lbs, administrating IV bolus fluids.  Pt had a fall in his room.    RN reported pt is taking sips of ensure and is complaining of voices.    Discharge Plan or Goal  Considerations include: nursing home placement    Barriers to Discharge  Pt does not have a guardian.  Guardians have declined taking his case.      Referral Status  Skilled home nursing    Legal Status  Patient is under MI commitment in [facility]

## 2021-11-03 NOTE — PLAN OF CARE
Problem: Behavioral Health Plan of Care  Goal: Develops/Participates in Therapeutic Vina to Support Successful Transition  Intervention: Foster Therapeutic Vina  Recent Flowsheet Documentation  Taken 11/3/2021 1144 by Tess Vaca RN  Trust Relationship/Rapport:   empathic listening provided   thoughts/feelings acknowledged   In bed all day refusing to get out of room. Three staff were able to get him out of bed to change his soaked, dirty diaper. He is odorous , and unkept . When changing him this staff washed away large amounts of brown substance from jeanie area. Staff have been trying to encourage fluids but he only took sip with his am meds. He refused any of food on his tray.This staff has been trying to encourage patient to change position through out day. Blunted affect, mood sad, feeling helpless being ;Stuck here so long'

## 2021-11-04 ENCOUNTER — APPOINTMENT (OUTPATIENT)
Dept: GENERAL RADIOLOGY | Facility: CLINIC | Age: 56
DRG: 885 | End: 2021-11-04
Attending: PHYSICIAN ASSISTANT
Payer: COMMERCIAL

## 2021-11-04 LAB
ALBUMIN SERPL-MCNC: 2.9 G/DL (ref 3.4–5)
ALP SERPL-CCNC: 89 U/L (ref 40–150)
ALT SERPL W P-5'-P-CCNC: 37 U/L (ref 0–70)
ANION GAP SERPL CALCULATED.3IONS-SCNC: 7 MMOL/L (ref 3–14)
AST SERPL W P-5'-P-CCNC: 76 U/L (ref 0–45)
BASOPHILS # BLD AUTO: 0.1 10E3/UL (ref 0–0.2)
BASOPHILS NFR BLD AUTO: 1 %
BILIRUB SERPL-MCNC: 0.5 MG/DL (ref 0.2–1.3)
BUN SERPL-MCNC: 15 MG/DL (ref 7–30)
CALCIUM SERPL-MCNC: 9.4 MG/DL (ref 8.5–10.1)
CHLORIDE BLD-SCNC: 108 MMOL/L (ref 94–109)
CK SERPL-CCNC: 180 U/L (ref 30–300)
CO2 SERPL-SCNC: 24 MMOL/L (ref 20–32)
CREAT SERPL-MCNC: 0.89 MG/DL (ref 0.66–1.25)
CRP SERPL-MCNC: 31 MG/L (ref 0–8)
EOSINOPHIL # BLD AUTO: 1.5 10E3/UL (ref 0–0.7)
EOSINOPHIL NFR BLD AUTO: 19 %
ERYTHROCYTE [DISTWIDTH] IN BLOOD BY AUTOMATED COUNT: 12.5 % (ref 10–15)
ERYTHROCYTE [SEDIMENTATION RATE] IN BLOOD BY WESTERGREN METHOD: 38 MM/HR (ref 0–20)
FOLATE SERPL-MCNC: 10.4 NG/ML
GFR SERPL CREATININE-BSD FRML MDRD: >90 ML/MIN/1.73M2
GLUCOSE BLD-MCNC: 117 MG/DL (ref 70–99)
HCT VFR BLD AUTO: 46.3 % (ref 40–53)
HGB BLD-MCNC: 15.7 G/DL (ref 13.3–17.7)
IMM GRANULOCYTES # BLD: 0 10E3/UL
IMM GRANULOCYTES NFR BLD: 1 %
LYMPHOCYTES # BLD AUTO: 1.3 10E3/UL (ref 0.8–5.3)
LYMPHOCYTES NFR BLD AUTO: 16 %
MAGNESIUM SERPL-MCNC: 2.1 MG/DL (ref 1.6–2.3)
MCH RBC QN AUTO: 30.7 PG (ref 26.5–33)
MCHC RBC AUTO-ENTMCNC: 33.9 G/DL (ref 31.5–36.5)
MCV RBC AUTO: 90 FL (ref 78–100)
MONOCYTES # BLD AUTO: 0.4 10E3/UL (ref 0–1.3)
MONOCYTES NFR BLD AUTO: 5 %
NEUTROPHILS # BLD AUTO: 4.5 10E3/UL (ref 1.6–8.3)
NEUTROPHILS NFR BLD AUTO: 58 %
NRBC # BLD AUTO: 0 10E3/UL
NRBC BLD AUTO-RTO: 0 /100
PHOSPHATE SERPL-MCNC: 4.4 MG/DL (ref 2.5–4.5)
PLATELET # BLD AUTO: 281 10E3/UL (ref 150–450)
POTASSIUM BLD-SCNC: 3.5 MMOL/L (ref 3.4–5.3)
PROT SERPL-MCNC: 8.1 G/DL (ref 6.8–8.8)
RBC # BLD AUTO: 5.12 10E6/UL (ref 4.4–5.9)
SARS-COV-2 RNA RESP QL NAA+PROBE: NEGATIVE
SODIUM SERPL-SCNC: 139 MMOL/L (ref 133–144)
TSH SERPL DL<=0.005 MIU/L-ACNC: 2.71 MU/L (ref 0.4–4)
VIT B12 SERPL-MCNC: 489 PG/ML (ref 193–986)
WBC # BLD AUTO: 7.9 10E3/UL (ref 4–11)

## 2021-11-04 PROCEDURE — 82746 ASSAY OF FOLIC ACID SERUM: CPT | Performed by: PHYSICIAN ASSISTANT

## 2021-11-04 PROCEDURE — 86140 C-REACTIVE PROTEIN: CPT | Performed by: PHYSICIAN ASSISTANT

## 2021-11-04 PROCEDURE — 71045 X-RAY EXAM CHEST 1 VIEW: CPT

## 2021-11-04 PROCEDURE — 86481 TB AG RESPONSE T-CELL SUSP: CPT | Performed by: PHYSICIAN ASSISTANT

## 2021-11-04 PROCEDURE — 99233 SBSQ HOSP IP/OBS HIGH 50: CPT | Performed by: PHYSICIAN ASSISTANT

## 2021-11-04 PROCEDURE — 250N000013 HC RX MED GY IP 250 OP 250 PS 637: Performed by: PHYSICIAN ASSISTANT

## 2021-11-04 PROCEDURE — 71045 X-RAY EXAM CHEST 1 VIEW: CPT | Mod: 26 | Performed by: RADIOLOGY

## 2021-11-04 PROCEDURE — 82306 VITAMIN D 25 HYDROXY: CPT | Performed by: PHYSICIAN ASSISTANT

## 2021-11-04 PROCEDURE — 82607 VITAMIN B-12: CPT | Performed by: PHYSICIAN ASSISTANT

## 2021-11-04 PROCEDURE — 85652 RBC SED RATE AUTOMATED: CPT | Performed by: PHYSICIAN ASSISTANT

## 2021-11-04 PROCEDURE — 36415 COLL VENOUS BLD VENIPUNCTURE: CPT | Performed by: PHYSICIAN ASSISTANT

## 2021-11-04 PROCEDURE — 82040 ASSAY OF SERUM ALBUMIN: CPT | Performed by: PHYSICIAN ASSISTANT

## 2021-11-04 PROCEDURE — U0003 INFECTIOUS AGENT DETECTION BY NUCLEIC ACID (DNA OR RNA); SEVERE ACUTE RESPIRATORY SYNDROME CORONAVIRUS 2 (SARS-COV-2) (CORONAVIRUS DISEASE [COVID-19]), AMPLIFIED PROBE TECHNIQUE, MAKING USE OF HIGH THROUGHPUT TECHNOLOGIES AS DESCRIBED BY CMS-2020-01-R: HCPCS | Performed by: PHYSICIAN ASSISTANT

## 2021-11-04 PROCEDURE — 84443 ASSAY THYROID STIM HORMONE: CPT | Performed by: PHYSICIAN ASSISTANT

## 2021-11-04 PROCEDURE — 82550 ASSAY OF CK (CPK): CPT | Performed by: PHYSICIAN ASSISTANT

## 2021-11-04 PROCEDURE — 124N000002 HC R&B MH UMMC

## 2021-11-04 PROCEDURE — 99233 SBSQ HOSP IP/OBS HIGH 50: CPT | Performed by: PSYCHIATRY & NEUROLOGY

## 2021-11-04 PROCEDURE — 250N000013 HC RX MED GY IP 250 OP 250 PS 637: Performed by: PSYCHIATRY & NEUROLOGY

## 2021-11-04 PROCEDURE — 80053 COMPREHEN METABOLIC PANEL: CPT | Performed by: PHYSICIAN ASSISTANT

## 2021-11-04 PROCEDURE — 84100 ASSAY OF PHOSPHORUS: CPT | Performed by: PHYSICIAN ASSISTANT

## 2021-11-04 PROCEDURE — 85025 COMPLETE CBC W/AUTO DIFF WBC: CPT | Performed by: PHYSICIAN ASSISTANT

## 2021-11-04 PROCEDURE — 83735 ASSAY OF MAGNESIUM: CPT | Performed by: PHYSICIAN ASSISTANT

## 2021-11-04 RX ADMIN — CLOTRIMAZOLE 1 LOZENGE: 10 LOZENGE ORAL; TOPICAL at 11:41

## 2021-11-04 RX ADMIN — SALMETEROL XINAFOATE 1 PUFF: 50 POWDER, METERED ORAL; RESPIRATORY (INHALATION) at 08:32

## 2021-11-04 RX ADMIN — CLOTRIMAZOLE 1 LOZENGE: 10 LOZENGE ORAL; TOPICAL at 08:26

## 2021-11-04 RX ADMIN — CLOTRIMAZOLE 1 LOZENGE: 10 LOZENGE ORAL; TOPICAL at 14:19

## 2021-11-04 RX ADMIN — ASPIRIN 81 MG CHEWABLE TABLET 81 MG: 81 TABLET CHEWABLE at 08:26

## 2021-11-04 RX ADMIN — LEVOTHYROXINE SODIUM 88 MCG: 88 TABLET ORAL at 08:25

## 2021-11-04 RX ADMIN — ATORVASTATIN CALCIUM 80 MG: 80 TABLET, FILM COATED ORAL at 21:14

## 2021-11-04 RX ADMIN — MEMANTINE 5 MG: 5 TABLET ORAL at 21:14

## 2021-11-04 RX ADMIN — Medication 50 MCG: at 08:26

## 2021-11-04 RX ADMIN — METOPROLOL TARTRATE 25 MG: 25 TABLET, FILM COATED ORAL at 21:14

## 2021-11-04 RX ADMIN — CHOLECALCIFEROL CAP 1.25 MG (50000 UNIT) 1250 MCG: 1.25 CAP at 11:41

## 2021-11-04 RX ADMIN — CLOZAPINE 350 MG: 100 TABLET ORAL at 21:14

## 2021-11-04 RX ADMIN — MEMANTINE 5 MG: 5 TABLET ORAL at 08:26

## 2021-11-04 RX ADMIN — METOPROLOL TARTRATE 25 MG: 25 TABLET, FILM COATED ORAL at 08:26

## 2021-11-04 ASSESSMENT — ACTIVITIES OF DAILY LIVING (ADL)
HYGIENE/GROOMING: WITH ASSISTANCE
ORAL_HYGIENE: WITH ASSISTANCE
DRESS: WITH ASSISTANCE
DRESS: WITH ASSISTANCE
LAUNDRY: UNABLE TO COMPLETE
LAUNDRY: UNABLE TO COMPLETE
HYGIENE/GROOMING: PROMPTS;WITH ASSISTANCE
ORAL_HYGIENE: WITH ASSISTANCE

## 2021-11-04 NOTE — PROGRESS NOTES
"[facility], [place]   Psychiatric Progress Note  Hospital Day: 127        Interim History:   The patient's care was discussed with the treatment team during the daily team meeting and/or staff's chart notes were reviewed.  Staff report patient continues to decline to leave bed, minimal intake, expresses this is due to being in hospital so long, had unwitnessed fall in the past day, no injury noted, BPs low with some tachycardia, taking medications as prescribed.     Upon interview pt was sitting in recliner by medical bed, with breakfast tray in front of him, 2 RNs attending to patients care. He kept stating that he was \"tired\" and wanted to go lay down in his bed, he was encouraged to remain sitting for period of time. Discussed concern about his decreased ambulation over the past few weeks, unsteadiness on his feet and recent fall and that if he does not use his legs or eat he will continue to get weak and be unable to walk at all. Stressed importance of increasing intake and spending more time out of bed, he did not appear to retain this information and kept stating his \"date is up\" with his commitment on \"August 3rd\" and wanting to discharge. [facility]. Continues to have TD, denies noticing. Continues to have AH that are unchanged. Denies VH. Denies SI. No additional concerns.     Discussed concerns for patients ongoing decline and possible failure to thrive with internal medicine, will evaluate patient and order additional work up.          Medications:       aspirin  81 mg Oral Daily     atorvastatin  80 mg Oral At Bedtime     cholecalciferol  1,250 mcg Oral Q7 Days     clotrimazole  1 lozenge Buccal 5x Daily     cloZAPine  350 mg Oral At Bedtime     levothyroxine  88 mcg Oral Daily     memantine  5 mg Oral BID     metoprolol tartrate  25 mg Oral BID     salmeterol  1 puff Inhalation BID     vitamin D3  50 mcg Oral Daily          Allergies: " "    Allergies   Allergen Reactions     Ibuprofen      Latex           Labs:     No results found for this or any previous visit (from the past 48 hour(s)).       Psychiatric Examination:     /70 (BP Location: Right arm, Patient Position: Supine)   Pulse 117   Temp 98.2  F (36.8  C) (Oral)   Resp 18   Ht 1.829 m (6')   Wt 81.1 kg (178 lb 11.2 oz)   SpO2 93%   BMI 24.24 kg/m    Weight is 178 lbs 11.2 oz  Body mass index is 24.24 kg/m .    Orthostatic Vitals       Most Recent      Lying Orthostatic BP 95/68 11/03 0916    Lying Orthostatic Pulse (bpm) 85 11/03 0916        Appearance: awake, alert, poor hygiene, no distress  Attitude:  uncooperative   Eye Contact:  improved from Tuesday, eyes open, intermittent eye contact  Mood:  \"tired\" continues to have periods of irritability   Affect:  mood congruent and intensity is flat  Speech:  soft volume, raspy, dysarthric  Language: fluent and intact in English  Psychomotor, Gait, Musculoskeletal:  no evidence of dystonia, or tics, continues to have movements of buccolingual dyskinesia present intermittently; noted to have unsteady gait when trying to get up from chair   Thought Process: disorganized  Associations:  no loose associations  Thought Content:  no evidence of SI or HI; continues to be delusional and observed responding to internal stimuli; endorses AH, denies VH   Insight:  limited  Judgement:  limited  Oriented to:  person  Attention Span and Concentration:  limited  Recent and Remote Memory:  limited  Fund of Knowledge:  delayed    Clinical Global Impressions  First:  Considering your total clinical experience with this particular patient population, how severe are the patient's symptoms at this time?: 7 (07/01/21 0630)  Compared to the patient's condition at the START of treatment, this patient's condition is: 4 (07/01/21 0630)  Most recent:  Considering your total clinical experience with this particular patient population, how severe are the " patient's symptoms at this time?: 6 (11/01/21 1609)  Compared to the patient's condition at the START of treatment, this patient's condition is: 5 (11/01/21 1609)         Precautions:     Behavioral Orders   Procedures     Assault precautions     Cheeking Precautions (behavioral units)     Patient Observed swallowing PO medications; Patient asked to drink water after swallowing medication; Patient in Staff line of sight for 15 minutes after medication given; Mouth checks after PO administration (patient asked to open mouth and stick out their tongue).     Code 1     Code 2     With Security for any imaging/testing needed.     Elopement precautions     Fall precautions     Routine Programming     As clinically indicated     Single Room     Status 15     Every 15 minutes.          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   Stimulant use disorder, in remission.   Transaminitis, possibly medication induced   Hx of CVA  Vit D deficiency  Hypertension  COPD  Hx of infective endocarditis with severe mitral and aortic regurgitation s/p biprostehtic valve replacement 2015  Hx of HFrEF now recovered  Tobacco use disorder  Severe malnutrition in context of acute illness   Urinary incontinence  Constipation   Malaise  Prolonged QTc         Assessment & Plan:   Assessment and hospital summary:  The patient is a 54yo male with a history of schizophrenia and TBI and multiple medical co-morbidities as above who was admitted after being transferred from [facility] medical floor after a nearly year-long stay. Is currently under commitment with Yanez recently amended to include Clozaril. While at [facility] was noted to have ongoing agitation and frequently attempting to elope from unit requiring 1:1 staffing for safety. He was started on 1:1 staff upon  transfer, this was discontinued as patient has been more cooperative without elopement attempts. IM consult completed on admission and continued to follow due to elevated LFTs. Haldol and VPA discontinued due to LFTs. Cross titration completed from risperidone to clozaril after Yanez amended. EPSE/TD movements noted, have appeared stable over past few weeks. Patient has continued to have disorganization and active psychosis symptoms which appear to be at patients baseline. Team continues to work on disposition which barriers include patient has no guardian or next of kin willing to act as surrogate decision maker, see CTC notes for complete details. Patient starting to have increasing symptoms noted week of 9/20, clozapine level ordered and noted to be lower than previous, have added cheeking precautions and increased clozapine dose on 9/24 and moved to split dosing given increasing afternoon agitation. Pt having decreased appetite, increased fatigue over weekend of 10/9-10/10, IM consult placed 10/11, KUB Xray showed large stool burden, bowel regimen modified. Pt was incontinent of stool evening of 10/13. Consolidated clozapine dose to bedtime given concern for daytime sedation with split dosing. Ongoing malaise noted starting 10/11, internal medicine has been contacted periodically. Noted 18# weight loss in 2 months, IM contacted for possible FTT on 11/4.    Psychiatric treatment/inteventions:  Medications:   -conitnue clozapine 350mg at bedtime, reduced over the past few weeks due to ongoing sedation/fatigue and now prolonged QTc; continue to monitor movements consistent with TD  -continue cheeking precautions, started 9/27 given noted decompensation in recent weeks and lower clozapine level   -continue memantine at 5mg daily for neurocognitive disorder, dose was up to 10mg BID and have been reducing dose, may taper off as patient does not appear to have benefit and reporting oversedation and to reduce  anticholinergic load  -continue risperdal 1mg Q6H PRN agitation   -continue lorazepam 0.5-1.0mg Q4H PRN anxiety or severe agitation    -discontinued benztropine 0.25mg BID on 10/21, tapered off to reduce anticholinergic load  -discontinued rivastigmine patch on 10/21,pt did not seemed to have benefit and to reduce anticholinergic load    Laboratory/Imaging: weekly WBC and ANC for clozapine monitoring continues ANC 10/29 was 3.9, will order repeat EKG for tomorrow to check QTc     Patient will be treated in therapeutic milieu with appropriate individual and group therapies as described.     Medical treatment/interventions:  Medical concerns:   IM re-consulted due to return of fever and ongoing malaise 10/22, additional labs ordered along with Echo. Also placed Neurology consult to rule out any possibility of seizure activity. Neurology did not feel any further work up warranted. Pt continued to have poor intake, now with tachycardia, dark urine and hypotension and possible thrush, also concern for bed sores and loose stools. Pt was given 1L IVF on 10/29, BP improved, IM signed off. Having low O2 sats, ongoing malaise, IM contacted by RN. Will also work to get patient medical bed. RN checking with IM 11/2 to see if IV should be pulled or if patient requires more fluids.     Per IM progress note 11/4:  Assessment and plan:  John Juárez is a 54 yo M with PMHx Schizophrenia, hx of TBI, alcohol use disorder, COPD, HLD, Hx of cardiomyopathy that has since resolved (EF 50% 2016), hx of endocarditis s/p bioprosthetic valve replacement 10/2015, Hx of CVA, and Hypothyroidism who was initially admitted to [facility] pm 9/9/2020 for evaluation of aggressive behavior at his group home, with a prolonged hospitalization due to difficulties finding guardianship. Medicine asked to evaluate for failure to thrive.      #Failure to thrive - Patient with poor PO intake, not getting out of bed and with 18 lb weight loss.  Patient denies any focal symptoms. Does not state why he won't eat, but denies any difficulty with swallowing or N/V. VSS, with mild tachycardia. Afebrile. Patient with significant work up 2 weeks ago for fever, which was negative and fevers have since resolved. Will repeat infectious and metabolic work up, but suspect this is likely due to depression or hopelessness due to prolonged hospital stay.   - Repeat CMP, CBC, CK, CRP, Sed rate, TSH, Folate, B12, Mag, phos, COVID swab  - Check AM cortisol, and quant gold   - CXR and UA   - May consider pan CT for possible underlying malignancy   - Consider starting Remeron for possible depression and appetite stimulant, consider Marinol for appetite stimulant, or adderall      #Severe malnutrition- Nutrition consulted and following. Patient with 18 lb weight loss. Patient would benefit from additional nutrition, like enteral nutrition via [place] though worry about patient removing tube. TPN via PICC would be very risky due to infections. Could consider PEG. Unfortunately patient is not decisional and no guardian in place to assist in making decisions.   - Recommend discussing with risk management, and nutrition about starting tube feeds.  - Nutrition consulted, appreciate assistance  - Calorie counts      #Major neurocognitive disorder dt hx of TBI and alcohol use disorder  #Schizophrenia  #Under commitment  Had been residing in group home prior to admission.  Brought to hospital for evaluation of aggressive behaviors. Group home now unwilling to take him back. Has had numerous CODE 21s called this stay. Seen by psych, meds adjusted. Is currently under civil commitment and court hold through [facility] until 7/31/21. Please see recent discharge summary for details on 6/30/20201.   -Management per psychiatry      #Oral thrush - Continue Cotrimazole      #Vitamin D Deficiency- Continue Cholecalciferol 50 mcg daily       #Hx of CVA - hx of basal ganglia stroke in 2015. No  focal neuro deficits aside from cognitive dysfunction as above.      #Hyperlipidemia - Continue PTA ASA 81 mg daily and atorvastatin 80 mg daily.      #COPD - Not O2 dependent. Continue PTA on salmeterol and albuterol PRN.      #Hypothyroidism- TSH 2.25 on 10/22/21.  Continue PTA levothyroxine 88 mcg daily.      #Hx of infective endocarditis with severe mitral and aortic regurgitation S/P bioprosthetic valve replacement (10/2015)  #Hx of HFrEF, now recovered, not in acute exacerbation  Follows with Dr. Dockery of Heart and Vascular Cardiology, last seen in 1/2020. Echocardiogram in 5/2016 with EF 50%, no evidence for prosthetic mitral and aortic valve dysfunction. Repeat blood cultures on 10/22 in setting of fevers NGTD. Repeat TTE on 10/23, technically difficult study with MV and AV replacement with normal doppler interrogation and s/p aortic root repair with pericardial patch.    - Follow-up with outpatient Cardiology upon dismissal from the hospital (on every 2 year follow-ups).  - Continue ASA 81 mg daily      # Transaminitis  # History of Hepatitis C   # Hepatosteatosis vs cirrhosis:   Has had LFT elevations ALT dating back to 2/2020 and AST dating back to 2014. He has had slowly rising LFTs since December. Asymptomatic. Possible medications causing would be Haldol, depakote which were tapered off. Statin was temporarily held. Abd Ultrasound this admission 7/5 with hepatosteatosis vs  cirrhosis without focal liver lesion.  No evidence of HCC.  The splenic and portal veins all antegrade in flow.  The patient did end the exam and refused to continue. Hepatitis C was reactive.  Hep C RNA reactive at 722,158, log 5.9. Patient reports hx of hep c that was treated about 25 years ago. He is unsure if it was fully treated.     - Given positive Hep C, will need close follow up with hepatology on discharge to discuss treatment    - Provided education on ways to reduce transmission      #Tobacco use disorder- Using  nicotine patch and PRN gum.        Medicine will follow up on CMP, CBC, CK, UA, CXR, quant gold, CRP, Sed rate, AM cortisol, magnesium, phos, No further recommendations at this time. Please page the on-call SHREE for any intercurrent medical issues which arise.      Edith Alfaro PA-C  Hospitalist Service      This note was created by undersigned using a Dragon dictation system. All typing errors or contextual distortion are unintentional and software inherent.     Disposition Plan   Reason for ongoing admission: is unable to care for self due to severe psychosis or mariusz and neurocognitive disorder   Discharge location: D, likely locked NH facility , unable to progress on discharge planning due to guardianship not being in place, see CTC notes  Discharge Medications: not ordered  Follow-up Appointments: not scheduled  Legal Status: Full MI commitment and Yanez     Entered by: Jeanette Dobbins on 11/4/2021 at 7:37 AM

## 2021-11-04 NOTE — PLAN OF CARE
BEHAVIORAL TEAM DISCUSSION    Participants:   Dr. Dobbins, Melissa WEN, Tess Vaca RN    Progress:  This is day 127 of this admission.  Pt is declining physically. Pt is refusing to eat.  Pt has lost 20 lbs recently.  Pt has not been getting out of bed and due to the concern about skin breakdown, has now been placed in a medical bed.  Pt has been getting some IV fluids.      Anticipated length of stay:   3 months      Continued Stay Criteria/Rationale:   The pt is unable to care for himself     Medical/Physical:   Patient was tachycardic x1  yesterday      Precautions:   Behavioral Orders   Procedures     Assault precautions     Cheeking Precautions (behavioral units)     Patient Observed swallowing PO medications; Patient asked to drink water after swallowing medication; Patient in Staff line of sight for 15 minutes after medication given; Mouth checks after PO administration (patient asked to open mouth and stick out their tongue).     Code 1     Code 2     With Security for any imaging/testing needed.     Elopement precautions     Fall precautions     Routine Programming     As clinically indicated     Single Room     Status 15     Every 15 minutes.     Plan:   Changed to a medical bed so had to change rooms  Transfer to 3B the senior unit  Another Internal Medicine Consult        Rationale for change in precautions or plan: the patient is physically  decompensating.

## 2021-11-04 NOTE — PLAN OF CARE
Patient presents with a blunted affect. Mood presents as depressed yet calm. Is cooperative. Speech is soft and mumbled. Eye contact is fair. Alert x1, self only. Patient denies depression, anxiety, suicidal ideation, SIB, and homicidal ideation. Patient denies auditory/visual hallucinations. Did not appear to be responding to internal stimuli this shift. Medication compliant. No medication cheeking noted. Patient refused evening meal. Ate 3 bites of Magic Cup. Drank 553 cc's of Ensure and sips of orange juice for this shift. This writer encouraged patient to eat, but patient refused. Patient spent the entire shift in his room in bed. No interaction with staff or peers noted.      /83   Pulse 91   Temp 97.6  F (36.4  C) (Oral)   Resp 16   Ht 1.829 m (6')   Wt 81.1 kg (178 lb 11.2 oz)   SpO2 93%   BMI 24.24 kg/m       Patient was tachycardic x1 this shift. BP, temperature, and O2 all WNL. See Vitals Flowsheet for details.

## 2021-11-04 NOTE — PLAN OF CARE
Problem: Cognitive Impairment (Psychotic Signs/Symptoms)  Goal: Optimal Cognitive Function (Psychotic Signs/Symptoms)  Intervention: Support and Promote Cognitive Ability  Recent Flowsheet Documentation  Taken 11/4/2021 1056 by Tess Vaca, RN  Trust Relationship/Rapport:   care explained   emotional support provided   reassurance provided   thoughts/feelings acknowledged   Continues to refuse nutrition this staff was able to encourage about 300 ml in so far. Staff were able to get him into a chair with assist of two.  While in chair staff tried to do a chair bath, as patient crabbed away. Hair was washed, and comb through as much as possible.  Would only sit in chair for about 20 min. He kept repeating' Im to tired, I want to go to bed.' He was medication compliant, had two large wet diapers. He did allow this staff to clean him up. Hr 117, 103/70 98.2 Usually he has been cooperative with this staff, but does not like to be bugged.

## 2021-11-04 NOTE — PLAN OF CARE
Pt slept 7 hours without issue. Pt remains on Assault, Elopement, Cheeking and Fall precautions. Pt remains on 15 minute checks. Will continue to monitor.

## 2021-11-04 NOTE — PLAN OF CARE
Assessment/Intervention/Current Symtoms and Care Coordination  -Chart review  -Pre round meeting with team  -Rounded with team, addressed patient needs/concerns  -Post round meeting with team  Current Symptoms include the following: Psychosis, VH, disorganization and confusion    Pt  is not eating and has lost 18 lbs. He is being given IV fluids some of the days. He does not get out of bed. He refuses to shower even with complete incontinence.   Our unit does not have the equipment to hoist him up for bathing/showering. He has been now placed in a new room where he can have a medical bed due to the concerns about skin breakdown.  Some staff are able to get him into a wheelchair to get out in the lounge for socialization. A thorough medical work- up has not yielded any obvious reason for this decline. This is being termed a failure to thrive. Another Internal Medicine consult is being placed. He does hear voices and cries out  I ve been here so long.   We are trying to transfer the patient to  hoping a change of environment would be therapeutic.  We are very concerned about him. We have been working on the guardianship issue for so long, I haven t made any nursing home referrals and I do not know how a nursing home would respond to such a referral.    Dr. Dobbins is working with Internal Medicine on pt's care.      Discharge Plan or Goal  Considerations include: nursing home placement    Barriers to Discharge  Patient  Does not have a guardian and efforts to obtain one have not been succuessful      Referral Status  legal is looking for a guardian    Legal Status  Patient is under MI commitment in [facility]

## 2021-11-04 NOTE — PLAN OF CARE
Patient presents with a blunted affect. Mood presents as depressed and irritable, yet calm. Is cooperative. Speech is soft and mumbled. Eye contact is fair. Alert x1, self only. Patient denies depression, anxiety, suicidal ideation, SIB, homicidal ideation, and auditory/visual hallucinations. Did not appear to be responding to internal stimuli this shift. Somewhat medication compliant. Refused 1600 and HS Clotrimazole Lozenge; and HS Salmeterol inhaler. No medication cheeking noted. Patient refused evening meal except for one bite of stuffing. Drank 652 ml's of Ensure and 295 ml's of fluids for this shift. This writer encouraged patient to eat, but patient refused. Patient showered this shift. Tolerated shower well. Patient spent the entire shift in his room in bed. No interaction with staff or peers noted.     /76   Pulse 101   Temp 98  F (36.7  C) (Oral)   Resp 18   Ht 1.829 m (6')   Wt 81.1 kg (178 lb 11.2 oz)   SpO2 96%   BMI 24.24 kg/m       COVID swab obtained. Patient is COVID negative.

## 2021-11-04 NOTE — PROGRESS NOTES
"Medicine Progress Note    Subjective:   John Juárez is a 56 yo M with PMHx Schizophrenia, hx of TBI, alcohol use disorder, COPD, HLD, Hx of cardiomyopathy that has since resolved (EF 50% 2016), hx of endocarditis s/p bioprosthetic valve replacement 10/2015, Hx of CVA, and Hypothyroidism who was initially admitted to [facility] pm 9/9/2020 for evaluation of aggressive behavior at his group home. Psychiatry evaluated patient and condition stabilized. Unfortunately patient's group home was unwilling to take him back. Patient subsequently had a prolonged hospitalization awaiting new placement and guardianship, under civil commitment through [facility]. Hospital course complicated by increasingly agitated behavior and attempts at elopement. Please see prior medicine consult notes for prior details, as medicine has been following for constipation, urinary incontinence and more recently fever and fatigue.     Medicine asked to re-evaluate patient with decline over the last month, failure to thrive, no PO intake, not getting out of bed or walking over the last 2 days and weight loss of 18 pounds over the last 2 months.     Patient poor historian and also behavioral he did not want answer questions.  After reassessment with nursing staff he was more alert and interactive.  Denies any chest pain or shortness of breath.  Denies any abdominal pain or nausea or vomiting.  Went asked why he was not eating he states \"there is nothing wrong with me\".  Denies any pain with swallowing.  Patient does not have any dentures and currently edentulous.     Objective:  /70 (BP Location: Right arm, Patient Position: Supine)   Pulse 117   Temp 98.2  F (36.8  C) (Oral)   Resp 18   Ht 1.829 m (6')   Wt 81.1 kg (178 lb 11.2 oz)   SpO2 93%   BMI 24.24 kg/m    General: Alert, interactive eventually after encouragement, NAD. Cachetic. Frail.   HEENT: Sclera anicteric, PERRL and EOMI. Buccal cavity with white plaque. "   Resp: Non-labored breathing on RA. Lungs clear to ascultation. No rales, rhonchi or wheezing.   Cardiac: S1, S2, regular rate and rhythm, no murmur  Abdomen: Soft, nontender, nondistended. +BS.  No masses, no rebound or guarding.  Extremities: No LE edema  Skin: Warm and dry, no generalized jaundice   Neuro: CN II - XII intact and symmetric. Strength 5/5 in upper extremities. Patient did not cooperate with assessing strength of LE, but did have 5/5 strength with plantar and dorsal flexion of the feet. Sensation intact. No pronator drift. DTR brisk +2.     Labs/diagnostics: Pending.     Assessment and plan:  John Juárez is a 56 yo M with PMHx Schizophrenia, hx of TBI, alcohol use disorder, COPD, HLD, Hx of cardiomyopathy that has since resolved (EF 50% 2016), hx of endocarditis s/p bioprosthetic valve replacement 10/2015, Hx of CVA, and Hypothyroidism who was initially admitted to [facility] pm 9/9/2020 for evaluation of aggressive behavior at his group home, with a prolonged hospitalization due to difficulties finding guardianship. Medicine asked to evaluate for failure to thrive.     #Failure to thrive - Patient with poor PO intake, not getting out of bed and with 18 lb weight loss. Patient denies any focal symptoms. Does not state why he won't eat, but denies any difficulty with swallowing or N/V. VSS, with mild tachycardia. Afebrile. Patient with significant work up 2 weeks ago for fever, which was negative and fevers have since resolved. Will repeat infectious and metabolic work up, but suspect this is likely due to depression or hopelessness due to prolonged hospital stay.   - Repeat CMP, CBC, CK, CRP, Sed rate, TSH, Folate, B12, Mag, phos, COVID swab  - Check AM cortisol, and quant gold   - CXR and UA   - May consider pan CT for possible underlying malignancy   - Consider starting Remeron for possible depression and appetite stimulant, consider Marinol for appetite stimulant, or adderall      #Severe malnutrition- Nutrition consulted and following. Patient with 18 lb weight loss. Patient would benefit from additional nutrition, like enteral nutrition via [place] though worry about patient removing tube. TPN via PICC would be very risky due to infections. Could consider PEG. Unfortunately patient is not decisional and no guardian in place to assist in making decisions.   - Recommend discussing with risk management, and nutrition about starting tube feeds.  - Nutrition consulted, appreciate assistance  - Calorie counts     #Major neurocognitive disorder dt hx of TBI and alcohol use disorder  #Schizophrenia  #Under commitment  Had been residing in group home prior to admission.  Brought to hospital for evaluation of aggressive behaviors. Group home now unwilling to take him back. Has had numerous CODE 21s called this stay. Seen by psych, meds adjusted. Is currently under civil commitment and court hold through [facility] until 7/31/21. Please see recent discharge summary for details on 6/30/20201.   -Management per psychiatry     #Oral thrush - Continue Cotrimazole      #Vitamin D Deficiency- Continue Cholecalciferol 50 mcg daily      #Hx of CVA - hx of basal ganglia stroke in 2015. No focal neuro deficits aside from cognitive dysfunction as above.     #Hyperlipidemia - Continue PTA ASA 81 mg daily and atorvastatin 80 mg daily.      #COPD - Not O2 dependent. Continue PTA on salmeterol and albuterol PRN.      #Hypothyroidism- TSH 2.25 on 10/22/21.  Continue PTA levothyroxine 88 mcg daily.      #Hx of infective endocarditis with severe mitral and aortic regurgitation S/P bioprosthetic valve replacement (10/2015)  #Hx of HFrEF, now recovered, not in acute exacerbation  Follows with Dr. Dockery of [facility], last seen in 1/2020. Echocardiogram in 5/2016 with EF 50%, no evidence for prosthetic mitral and aortic valve dysfunction. Repeat blood cultures on 10/22 in setting of fevers NGTD.  Repeat TTE on 10/23, technically difficult study with MV and AV replacement with normal doppler interrogation and s/p aortic root repair with pericardial patch.    - Follow-up with outpatient Cardiology upon dismissal from the hospital (on every 2 year follow-ups).  - Continue ASA 81 mg daily     # Transaminitis  # History of Hepatitis C   # Hepatosteatosis vs cirrhosis:   Has had LFT elevations ALT dating back to 2/2020 and AST dating back to 2014. He has had slowly rising LFTs since December. Asymptomatic. Possible medications causing would be Haldol, depakote which were tapered off. Statin was temporarily held. Abd Ultrasound this admission 7/5 with hepatosteatosis vs  cirrhosis without focal liver lesion.  No evidence of HCC.  The splenic and portal veins all antegrade in flow.  The patient did end the exam and refused to continue. Hepatitis C was reactive.  Hep C RNA reactive at 722,158, log 5.9. Patient reports hx of hep c that was treated about 25 years ago. He is unsure if it was fully treated.     - Given positive Hep C, will need close follow up with hepatology on discharge to discuss treatment    - Provided education on ways to reduce transmission      #Tobacco use disorder- Using nicotine patch and PRN gum.      Medicine will follow up on CMP, CBC, CK, UA, CXR, quant gold, CRP, Sed rate, AM cortisol, magnesium, phos. Discussed case with IM physician on call, Dr. Justin Martinez. No further recommendations at this time. Please page the on-call [facility] for any intercurrent medical issues which arise.     Edith Alfaro PA-C  Hospitalist Service  Contact information available via [facility] Paging/Directory

## 2021-11-05 LAB
ALBUMIN UR-MCNC: 20 MG/DL
AMORPH CRY #/AREA URNS HPF: ABNORMAL /HPF
APPEARANCE UR: ABNORMAL
BILIRUB UR QL STRIP: NEGATIVE
COLOR UR AUTO: YELLOW
CORTIS SERPL-MCNC: 20.9 UG/DL (ref 4–22)
DEPRECATED CALCIDIOL+CALCIFEROL SERPL-MC: 99 UG/L (ref 20–75)
GAMMA INTERFERON BACKGROUND BLD IA-ACNC: 0.76 IU/ML
GLUCOSE UR STRIP-MCNC: NEGATIVE MG/DL
HGB UR QL STRIP: NEGATIVE
KETONES UR STRIP-MCNC: NEGATIVE MG/DL
LEUKOCYTE ESTERASE UR QL STRIP: NEGATIVE
M TB IFN-G BLD-IMP: NEGATIVE
M TB IFN-G CD4+ BCKGRND COR BLD-ACNC: 9.24 IU/ML
MITOGEN IGNF BCKGRD COR BLD-ACNC: -0.01 IU/ML
MITOGEN IGNF BCKGRD COR BLD-ACNC: 0 IU/ML
MUCOUS THREADS #/AREA URNS LPF: PRESENT /LPF
NITRATE UR QL: NEGATIVE
NT-PROBNP SERPL-MCNC: 254 PG/ML (ref 0–900)
PH UR STRIP: 6.5 [PH] (ref 5–7)
QUANTIFERON MITOGEN: 10 IU/ML
QUANTIFERON NIL TUBE: 0.76 IU/ML
QUANTIFERON TB1 TUBE: 0.75 IU/ML
QUANTIFERON TB2 TUBE: 0.76
RBC URINE: 1 /HPF
SP GR UR STRIP: 1.02 (ref 1–1.03)
SPERM #/AREA URNS HPF: PRESENT /HPF
TROPONIN I SERPL-MCNC: <0.015 UG/L (ref 0–0.04)
UROBILINOGEN UR STRIP-MCNC: 3 MG/DL
WBC URINE: 1 /HPF

## 2021-11-05 PROCEDURE — 250N000013 HC RX MED GY IP 250 OP 250 PS 637: Performed by: PHYSICIAN ASSISTANT

## 2021-11-05 PROCEDURE — 258N000003 HC RX IP 258 OP 636: Performed by: PHYSICIAN ASSISTANT

## 2021-11-05 PROCEDURE — 82533 TOTAL CORTISOL: CPT | Performed by: PHYSICIAN ASSISTANT

## 2021-11-05 PROCEDURE — 87077 CULTURE AEROBIC IDENTIFY: CPT | Performed by: PHYSICIAN ASSISTANT

## 2021-11-05 PROCEDURE — 93010 ELECTROCARDIOGRAM REPORT: CPT | Performed by: INTERNAL MEDICINE

## 2021-11-05 PROCEDURE — 250N000013 HC RX MED GY IP 250 OP 250 PS 637: Performed by: PSYCHIATRY & NEUROLOGY

## 2021-11-05 PROCEDURE — 83880 ASSAY OF NATRIURETIC PEPTIDE: CPT | Performed by: PHYSICIAN ASSISTANT

## 2021-11-05 PROCEDURE — 36415 COLL VENOUS BLD VENIPUNCTURE: CPT | Performed by: PHYSICIAN ASSISTANT

## 2021-11-05 PROCEDURE — 81003 URINALYSIS AUTO W/O SCOPE: CPT | Performed by: PHYSICIAN ASSISTANT

## 2021-11-05 PROCEDURE — 99233 SBSQ HOSP IP/OBS HIGH 50: CPT | Performed by: PSYCHIATRY & NEUROLOGY

## 2021-11-05 PROCEDURE — 87149 DNA/RNA DIRECT PROBE: CPT | Performed by: PHYSICIAN ASSISTANT

## 2021-11-05 PROCEDURE — 124N000002 HC R&B MH UMMC

## 2021-11-05 PROCEDURE — 999N000127 HC STATISTIC PERIPHERAL IV START W US GUIDANCE

## 2021-11-05 PROCEDURE — 84484 ASSAY OF TROPONIN QUANT: CPT | Performed by: PHYSICIAN ASSISTANT

## 2021-11-05 PROCEDURE — 93005 ELECTROCARDIOGRAM TRACING: CPT

## 2021-11-05 RX ADMIN — SODIUM CHLORIDE, POTASSIUM CHLORIDE, SODIUM LACTATE AND CALCIUM CHLORIDE 1000 ML: 600; 310; 30; 20 INJECTION, SOLUTION INTRAVENOUS at 16:00

## 2021-11-05 RX ADMIN — Medication 50 MCG: at 08:39

## 2021-11-05 RX ADMIN — ATORVASTATIN CALCIUM 80 MG: 80 TABLET, FILM COATED ORAL at 21:10

## 2021-11-05 RX ADMIN — MEMANTINE 5 MG: 5 TABLET ORAL at 21:10

## 2021-11-05 RX ADMIN — ACETAMINOPHEN 650 MG: 325 TABLET, FILM COATED ORAL at 21:19

## 2021-11-05 RX ADMIN — CLOTRIMAZOLE 1 LOZENGE: 10 LOZENGE ORAL; TOPICAL at 08:39

## 2021-11-05 RX ADMIN — CLOZAPINE 350 MG: 100 TABLET ORAL at 21:10

## 2021-11-05 RX ADMIN — LEVOTHYROXINE SODIUM 88 MCG: 88 TABLET ORAL at 08:40

## 2021-11-05 RX ADMIN — MEMANTINE 5 MG: 5 TABLET ORAL at 08:39

## 2021-11-05 RX ADMIN — METOPROLOL TARTRATE 25 MG: 25 TABLET, FILM COATED ORAL at 08:39

## 2021-11-05 RX ADMIN — METOPROLOL TARTRATE 25 MG: 25 TABLET, FILM COATED ORAL at 21:10

## 2021-11-05 RX ADMIN — ASPIRIN 81 MG CHEWABLE TABLET 81 MG: 81 TABLET CHEWABLE at 08:39

## 2021-11-05 ASSESSMENT — ACTIVITIES OF DAILY LIVING (ADL)
ORAL_HYGIENE: WITH ASSISTANCE
DRESS: WITH ASSISTANCE
ORAL_HYGIENE: WITH ASSISTANCE
LAUNDRY: UNABLE TO COMPLETE
HYGIENE/GROOMING: WITH ASSISTANCE
HYGIENE/GROOMING: WITH ASSISTANCE
LAUNDRY: WITH SUPERVISION
DRESS: WITH ASSISTANCE

## 2021-11-05 NOTE — PROGRESS NOTES
"[facility], [place]   Psychiatric Progress Note  Hospital Day: 128        Interim History:   The patient's care was discussed with the treatment team during the daily team meeting and/or staff's chart notes were reviewed.  Staff report patient has continued to isolate to room, did take a shower, only ate one bite of stuffing for dinner, did drink some Ensure and fluids, taking Yanez medication, declining other lozenges or inhaler, no acute events overnight.     Upon interview pt was sleeping with head tilted almost hanging off bed, able to wake to voice, encouraged to move head and sit up and he followed direction. He then started asking more questions and engaged more than previous visits \"whats the name of this place\" was provided orientation several times, he did not assume he was [facility] as he has done previously. Denied SI. Reports ongoing AH. Continues to state he is tired and asked when he can \"leave this place\" and again reviewed plan to finding nursing home \"I don't need nursing home, I have mansions all over [facility]\". Redirected back to treatment plan. Encouraged him to get out of bed, spend time out of room, continue to ambulate due to concern for weakness and physical deterioration. Also encouraged him to eat. No additional concerns.     Writer coordinated care with Internal Medicine and Ethics, had placed Ethics consult earlier in day to discuss possibility of NJ or PEG feedings in future.            Medications:       aspirin  81 mg Oral Daily     atorvastatin  80 mg Oral At Bedtime     cholecalciferol  1,250 mcg Oral Q7 Days     clotrimazole  1 lozenge Buccal 5x Daily     cloZAPine  350 mg Oral At Bedtime     levothyroxine  88 mcg Oral Daily     memantine  5 mg Oral BID     metoprolol tartrate  25 mg Oral BID     salmeterol  1 puff Inhalation BID     vitamin D3  50 mcg Oral Daily          Allergies:     Allergies   Allergen Reactions     Ibuprofen      " Latex           Labs:     Recent Results (from the past 48 hour(s))   Asymptomatic COVID-19 Virus (Coronavirus) by PCR Nasopharyngeal    Collection Time: 11/04/21  5:24 PM    Specimen: Nasopharyngeal; Swab   Result Value Ref Range    SARS CoV2 PCR Negative Negative   TSH with free T4 reflex    Collection Time: 11/04/21  5:35 PM   Result Value Ref Range    TSH 2.71 0.40 - 4.00 mU/L   Comprehensive metabolic panel    Collection Time: 11/04/21  5:35 PM   Result Value Ref Range    Sodium 139 133 - 144 mmol/L    Potassium 3.5 3.4 - 5.3 mmol/L    Chloride 108 94 - 109 mmol/L    Carbon Dioxide (CO2) 24 20 - 32 mmol/L    Anion Gap 7 3 - 14 mmol/L    Urea Nitrogen 15 7 - 30 mg/dL    Creatinine 0.89 0.66 - 1.25 mg/dL    Calcium 9.4 8.5 - 10.1 mg/dL    Glucose 117 (H) 70 - 99 mg/dL    Alkaline Phosphatase 89 40 - 150 U/L    AST 76 (H) 0 - 45 U/L    ALT 37 0 - 70 U/L    Protein Total 8.1 6.8 - 8.8 g/dL    Albumin 2.9 (L) 3.4 - 5.0 g/dL    Bilirubin Total 0.5 0.2 - 1.3 mg/dL    GFR Estimate >90 >60 mL/min/1.73m2   CRP inflammation    Collection Time: 11/04/21  5:35 PM   Result Value Ref Range    CRP Inflammation 31.0 (H) 0.0 - 8.0 mg/L   Magnesium    Collection Time: 11/04/21  5:35 PM   Result Value Ref Range    Magnesium 2.1 1.6 - 2.3 mg/dL   Phosphorus    Collection Time: 11/04/21  5:35 PM   Result Value Ref Range    Phosphorus 4.4 2.5 - 4.5 mg/dL   CK total    Collection Time: 11/04/21  5:35 PM   Result Value Ref Range     30 - 300 U/L   Erythrocyte sedimentation rate auto    Collection Time: 11/04/21  5:35 PM   Result Value Ref Range    Erythrocyte Sedimentation Rate 38 (H) 0 - 20 mm/hr   Vitamin B12    Collection Time: 11/04/21  5:35 PM   Result Value Ref Range    Vitamin B12 489 193 - 986 pg/mL   Folate    Collection Time: 11/04/21  5:35 PM   Result Value Ref Range    Folic Acid 10.4 >=5.4 ng/mL   CBC with platelets and differential    Collection Time: 11/04/21  5:35 PM   Result Value Ref Range    WBC Count 7.9 4.0 -  "11.0 10e3/uL    RBC Count 5.12 4.40 - 5.90 10e6/uL    Hemoglobin 15.7 13.3 - 17.7 g/dL    Hematocrit 46.3 40.0 - 53.0 %    MCV 90 78 - 100 fL    MCH 30.7 26.5 - 33.0 pg    MCHC 33.9 31.5 - 36.5 g/dL    RDW 12.5 10.0 - 15.0 %    Platelet Count 281 150 - 450 10e3/uL    % Neutrophils 58 %    % Lymphocytes 16 %    % Monocytes 5 %    % Eosinophils 19 %    % Basophils 1 %    % Immature Granulocytes 1 %    NRBCs per 100 WBC 0 <1 /100    Absolute Neutrophils 4.5 1.6 - 8.3 10e3/uL    Absolute Lymphocytes 1.3 0.8 - 5.3 10e3/uL    Absolute Monocytes 0.4 0.0 - 1.3 10e3/uL    Absolute Eosinophils 1.5 (H) 0.0 - 0.7 10e3/uL    Absolute Basophils 0.1 0.0 - 0.2 10e3/uL    Absolute Immature Granulocytes 0.0 <=0.0 10e3/uL    Absolute NRBCs 0.0 10e3/uL          Psychiatric Examination:     /75 (BP Location: Left arm, Patient Position: Supine)   Pulse 112   Temp 98.7  F (37.1  C) (Oral)   Resp 18   Ht 1.829 m (6')   Wt 81.1 kg (178 lb 11.2 oz)   SpO2 92%   BMI 24.24 kg/m    Weight is 178 lbs 11.2 oz  Body mass index is 24.24 kg/m .    Orthostatic Vitals       Most Recent      Sitting Orthostatic /73 11/05 0832    Sitting Orthostatic Pulse (bpm) 91 11/05 0832        Appearance: awake, alert, untidy  Attitude:  A little more cooperative  Eye Contact:  improved from yesterday, kept eyes open with more contact during interview   Mood:  \"fine\" continues to have periods of irritability   Affect:  mood congruent and intensity is flat  Speech:  soft volume, raspy, dysarthric  Language: fluent and intact in English  Psychomotor, Gait, Musculoskeletal:  no evidence of dystonia, or tics, continues to have movements of buccolingual dyskinesia present intermittently  Thought Process: disorganized  Associations:  no loose associations  Thought Content:  no evidence of SI or HI; continues to be delusional and observed responding to internal stimuli; endorses AH, denies VH   Insight:  limited  Judgement:  limited  Oriented to:  " person  Attention Span and Concentration:  limited  Recent and Remote Memory:  limited  Fund of Knowledge:  delayed    Clinical Global Impressions  First:  Considering your total clinical experience with this particular patient population, how severe are the patient's symptoms at this time?: 7 (07/01/21 0630)  Compared to the patient's condition at the START of treatment, this patient's condition is: 4 (07/01/21 0630)  Most recent:  Considering your total clinical experience with this particular patient population, how severe are the patient's symptoms at this time?: 6 (11/01/21 1609)  Compared to the patient's condition at the START of treatment, this patient's condition is: 5 (11/01/21 1609)         Precautions:     Behavioral Orders   Procedures     Assault precautions     Cheeking Precautions (behavioral units)     Patient Observed swallowing PO medications; Patient asked to drink water after swallowing medication; Patient in Staff line of sight for 15 minutes after medication given; Mouth checks after PO administration (patient asked to open mouth and stick out their tongue).     Code 1     Code 2     With Security for any imaging/testing needed.     Elopement precautions     Fall precautions     Routine Programming     As clinically indicated     Single Room     Status 15     Every 15 minutes.          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   Stimulant use disorder, in remission.   Transaminitis, possibly medication induced   Hx of CVA  Vit D deficiency  Hypertension  COPD  Hx of infective endocarditis with severe mitral and aortic regurgitation s/p biprostehtic valve replacement 2015  Hx of HFrEF now recovered  Tobacco use disorder  Severe malnutrition in context of acute illness   Urinary incontinence  Constipation    Malaise  Prolonged QTc         Assessment & Plan:   Assessment and hospital summary:  The patient is a 56yo male with a history of schizophrenia and TBI and multiple medical co-morbidities as above who was admitted after being transferred from [facility] medical [facility] after a nearly year-long stay. Is currently under commitment with Yanez recently amended to include Clozaril. While at [facility] was noted to have ongoing agitation and frequently attempting to elope from unit requiring 1:1 staffing for safety. He was started on 1:1 staff upon transfer, this was discontinued as patient has been more cooperative without elopement attempts. IM consult completed on admission and continued to follow due to elevated LFTs. Haldol and VPA discontinued due to LFTs. Cross titration completed from risperidone to clozaril after Yanez amended. EPSE/TD movements noted, have appeared stable over past few weeks. Patient has continued to have disorganization and active psychosis symptoms which appear to be at patients baseline. Team continues to work on disposition which barriers include patient has no guardian or next of kin willing to act as surrogate decision maker, see CTC notes for complete details. Patient starting to have increasing symptoms noted week of 9/20, clozapine level ordered and noted to be lower than previous, have added cheeking precautions and increased clozapine dose on 9/24 and moved to split dosing given increasing afternoon agitation. Pt having decreased appetite, increased fatigue over weekend of 10/9-10/10, IM consult placed 10/11, KUB Xray showed large stool burden, bowel regimen modified. Pt was incontinent of stool evening of 10/13. Consolidated clozapine dose to bedtime given concern for daytime sedation with split dosing. Ongoing malaise noted starting 10/11, internal medicine has been contacted periodically. Noted 18# weight loss in 2 months, IM contacted for possible FTT on 11/4.    Psychiatric  treatment/inteventions:  Medications:   -conitnue clozapine 350mg at bedtime, reduced over the past few weeks due to ongoing sedation/fatigue and now prolonged QTc; continue to monitor movements consistent with TD  -continue cheeking precautions, started 9/27 given noted decompensation in recent weeks and lower clozapine level   -continue memantine at 5mg daily for neurocognitive disorder, dose was up to 10mg BID and have been reducing dose, may taper off as patient does not appear to have benefit and reporting oversedation and to reduce anticholinergic load  -continue risperdal 1mg Q6H PRN agitation   -continue lorazepam 0.5-1.0mg Q4H PRN anxiety or severe agitation    -discontinued benztropine 0.25mg BID on 10/21, tapered off to reduce anticholinergic load  -discontinued rivastigmine patch on 10/21,pt did not seemed to have benefit and to reduce anticholinergic load      -Ethics consult placed 11/5 due to patient lacking decision making capacity without guardian or surrogate decision maker and possible need for medical interventions including NJ or PEG tube in future, appreciate assistance     Laboratory/Imaging: weekly WBC and ANC for clozapine monitoring continues ANC 10/29 was 3.9,  repeat EKG showed improvement in QTc at 451ms     Patient will be treated in therapeutic milieu with appropriate individual and group therapies as described.     Medical treatment/interventions:  Medical concerns:   IM re-consulted due to return of fever and ongoing malaise 10/22, additional labs ordered along with Echo. Also placed Neurology consult to rule out any possibility of seizure activity. Neurology did not feel any further work up warranted. Pt continued to have poor intake, now with tachycardia, dark urine and hypotension and possible thrush, also concern for bed sores and loose stools. Pt was given 1L IVF on 10/29, BP improved, IM signed off. Having low O2 sats, ongoing malaise, IM contacted by RN. Will also work to get  patient medical bed. RN checking with IM 11/2 to see if IV should be pulled or if patient requires more fluids. Discussed concern for failure to thrive with IM on 11/4 who re-evaluated patient.    Per IM progress note 11/4:  Assessment and plan:  John Juárez is a 54 yo M with PMHx Schizophrenia, hx of TBI, alcohol use disorder, COPD, HLD, Hx of cardiomyopathy that has since resolved (EF 50% 2016), hx of endocarditis s/p bioprosthetic valve replacement 10/2015, Hx of CVA, and Hypothyroidism who was initially admitted to [facility] pm 9/9/2020 for evaluation of aggressive behavior at his group home, with a prolonged hospitalization due to difficulties finding guardianship. Medicine asked to evaluate for failure to thrive.      #Failure to thrive - Patient with poor PO intake, not getting out of bed and with 18 lb weight loss. Patient denies any focal symptoms. Does not state why he won't eat, but denies any difficulty with swallowing or N/V. VSS, with mild tachycardia. Afebrile. Patient with significant work up 2 weeks ago for fever, which was negative and fevers have since resolved. Will repeat infectious and metabolic work up, but suspect this is likely due to depression or hopelessness due to prolonged hospital stay.   - Repeat CMP, CBC, CK, CRP, Sed rate, TSH, Folate, B12, Mag, phos, COVID swab  - Check AM cortisol, and quant gold   - CXR and UA   - May consider pan CT for possible underlying malignancy   - Consider starting Remeron for possible depression and appetite stimulant, consider Marinol for appetite stimulant, or adderall      #Severe malnutrition- Nutrition consulted and following. Patient with 18 lb weight loss. Patient would benefit from additional nutrition, like enteral nutrition via NJ though worry about patient removing tube. TPN via PICC would be very risky due to infections. Could consider PEG. Unfortunately patient is not decisional and no guardian in place to assist in making  decisions.   - Recommend discussing with risk management, and nutrition about starting tube feeds.  - Nutrition consulted, appreciate assistance  - Calorie counts      #Major neurocognitive disorder dt hx of TBI and alcohol use disorder  #Schizophrenia  #Under commitment  Had been residing in group home prior to admission.  Brought to hospital for evaluation of aggressive behaviors. Group home now unwilling to take him back. Has had numerous CODE 21s called this stay. Seen by psych, meds adjusted. Is currently under civil commitment and court hold through [facility] until 7/31/21. Please see recent discharge summary for details on 6/30/20201.   -Management per psychiatry      #Oral thrush - Continue Cotrimazole      #Vitamin D Deficiency- Continue Cholecalciferol 50 mcg daily       #Hx of CVA - hx of basal ganglia stroke in 2015. No focal neuro deficits aside from cognitive dysfunction as above.      #Hyperlipidemia - Continue PTA ASA 81 mg daily and atorvastatin 80 mg daily.      #COPD - Not O2 dependent. Continue PTA on salmeterol and albuterol PRN.      #Hypothyroidism- TSH 2.25 on 10/22/21.  Continue PTA levothyroxine 88 mcg daily.      #Hx of infective endocarditis with severe mitral and aortic regurgitation S/P bioprosthetic valve replacement (10/2015)  #Hx of HFrEF, now recovered, not in acute exacerbation  Follows with Dr. Dockery of [facility], last seen in 1/2020. Echocardiogram in 5/2016 with EF 50%, no evidence for prosthetic mitral and aortic valve dysfunction. Repeat blood cultures on 10/22 in setting of fevers NGTD. Repeat TTE on 10/23, technically difficult study with MV and AV replacement with normal doppler interrogation and s/p aortic root repair with pericardial patch.    - Follow-up with outpatient Cardiology upon dismissal from the hospital (on every 2 year follow-ups).  - Continue ASA 81 mg daily      # Transaminitis  # History of Hepatitis C   # Hepatosteatosis vs  cirrhosis:   Has had LFT elevations ALT dating back to 2/2020 and AST dating back to 2014. He has had slowly rising LFTs since December. Asymptomatic. Possible medications causing would be Haldol, depakote which were tapered off. Statin was temporarily held. Abd Ultrasound this admission 7/5 with hepatosteatosis vs  cirrhosis without focal liver lesion.  No evidence of HCC.  The splenic and portal veins all antegrade in flow.  The patient did end the exam and refused to continue. Hepatitis C was reactive.  Hep C RNA reactive at 722,158, log 5.9. Patient reports hx of hep c that was treated about 25 years ago. He is unsure if it was fully treated.     - Given positive Hep C, will need close follow up with hepatology on discharge to discuss treatment    - Provided education on ways to reduce transmission      #Tobacco use disorder- Using nicotine patch and PRN gum.        Medicine will follow up on CMP, CBC, CK, UA, CXR, quant gold, CRP, Sed rate, AM cortisol, magnesium, phos, No further recommendations at this time. Please page the on-call SHREE for any intercurrent medical issues which arise.      Edith Alfaro PA-C  Hospitalist Service      This note was created by undersigned using a Dragon dictation system. All typing errors or contextual distortion are unintentional and software inherent.     Disposition Plan   Reason for ongoing admission: is unable to care for self due to severe psychosis or mariusz and neurocognitive disorder   Discharge location: [facility], likely locked NH facility , unable to progress on discharge planning due to guardianship not being in place, see CTC notes  Discharge Medications: not ordered  Follow-up Appointments: not scheduled  Legal Status: Full MI commitment and Yanez     Entered by: Jeanette Dobbins on 11/5/2021 at 6:34 AM

## 2021-11-05 NOTE — PROGRESS NOTES
Ethics Consultation - Progress Note        Date: 11/5    Time:  1300     Attending physician: Jeanette Dobbins     Consult requested by: Jeanette Dobbins     Reason for consult: Appropriateness of PEG vs NG tube for pt with aggression and no PO intake     Participants in Consult:      Mk Humphrey, luca Waldrop, luca Dobbins MD, Psychiatry  Edith Alfaro PA-C, Medicine     Decisional capacity: unable to make decisions    Advance directive: none    HCA: none     Code status: full code      Background: (Medical)       Past history of schizophrenia, Traumatic brain Injury, alcohol use disorder     COPD, HLD, Cardiomyopathy since resolved, Endocarditis with bioprosthetic valve replacement october 2015, CVA and hypothyroidism    Presented to [facility] ED for aggressive behavior at group home 9/9/2020    Decline over last month, failure to thrive, no oral intake, does not get out of bed, weight loss (18 lbs in last two months)    Social:       Group home unwilling take patient back once stable    prolonged hospitalization while awaiting placement and guardianship    Pt is poor historian and does not answer medical team questions regarding refusal to eat.    Pt has been hospitalized for over a year waiting guardianship for safe disposition     Ethical Issue: The ethical permissibility of involuntary artificial nutrition without consent in a nondecisional patient with no guardian      Ethics Analysis:    Mr Juárez has a significant psychiatric, neurocognitive, and medical history. Mr. Juárez has stopped eating and the care team is considering if it is appropriate to begin artificial nutrition without consent as Mr Juárez is not decisional and has no surrogate or guardian. The medical team believes the patient would benefit from enteral nutrition via NJ but are concerned the patient will remove the tube which might lead to the use of restraints. The patient s inability to consent to artificial  nutrition, the lack of guardian and the possibility of restraint all indicate that the question at hand is the ethical permissibility of involuntarily providing artificial nutrition.    In general, medical interventions need to balance autonomy, beneficence, and non-maleficence. Acting without the patient s voluntary consent to a procedure is only defensible if the benefit of the intervention outweighs the risks and burdens. Diagnosis and prognosis are important in the determination of risks and burdens. If the underlying condition is not treatable it is difficult to ethically justify a burdensome procedure which infringes on autonomy. Justifying an involuntary procedure depends, then, on the specific achievable goals of the intervention.    The question of whether involuntary artificial nutrition is ethically appropriate or not depends on the patient s underlying diagnosis and prognosis. In order to know about permissibility of artificial nutrition, one must first have vital questions answered. Questions include what is the patient's prognosis, is this recoverable to the best of prognostic ability, and is there underlying diagnosis of dementia, depression, or other cognitive impairment. The direct ethical question of NOAH cannot be answered without these first. This is imperative as the ethical permissibility of providing nutrition for patients with dementia differs depending on how advanced their dementia is and if they are actively in the dying process. While Mr Juárez is only 55, his significant psychiatric history and neurocognitive disorder as well as a history of traumatic brain injury indicate that early dementia is possible in this case.     Artificial nutrition is ethically permissible for patients who are likely to survive hospitalization. However, artificial nutrition has not been shown to prolong life, ensure adequate nutrition, or prevent aspiration in patients with advanced dementia or who are in the  active process of dying. See [facility] WARNER, Pancho MURO, Azam ALVARENGA. Tube Feeding in Patients With Advanced Dementia: A Review of the Evidence. NIC? 1999;282(14):5299-6082. A neurocognitive assessment would be helpful to clarify whether dementia or depression is the primary factor in this patients cessation of eating.    Additionally, there are concerns from the bedside that the patient might be in the dying process. A palliative care consult could lead to a better understanding of the patient s prognosis and his ability to return to life outside of a hospital setting.        Recommendations:     Medical team care conference with ethics to clarify questions regarding prognosis, underlying diagnosis (dementia or depression) and prognosis    Neurocognitive evaluation to assist team in clarifying underlying source    Palliative care consult to better understand prognosis and patient s ability to return to life outside hospital setting    Mk Humphrey  Ethics Consultant  Pager [phone]

## 2021-11-05 NOTE — PLAN OF CARE
Patient presents with a blunted, tense, and irritable affect. Mood presents as irritable and tense. Is cooperative. Speech is mumbled. Eye contact is fair. Alert x1, self only. Patient denies depression, anxiety, suicidal ideation, SIB, and homicidal ideation. Patient denies auditory/visual hallucinations, yet appears to be responding to internal stimuli; as evidenced by, staring into space and lips moving as if talking to imaginary person or persons. Somewhat medication compliant. RefusedHS Salmeterol inhaler. No medication cheeking noted. Patient refused evening meal. Ate 3 bites of Magic Cup. Drank 415 ml's of Ensure and 954 ml's of fluids for this shift. Patient is now on calorie count. This writer and staff encouraged patient to eat and drink, but patient refused to eat. Patient spent the entire shift in his room in bed. No interaction with staff or peers noted.     BP 98/69 (BP Location: Left arm, Patient Position: Supine)   Pulse 102   Temp 98.8  F (37.1  C) (Oral)   Resp 18   Ht 1.829 m (6')   Wt 81.1 kg (178 lb 11.2 oz)   SpO2 96%   BMI 24.24 kg/m       Patient was straight cathed x1 per order this shift with 70 ml's output of clear franko urine noted. Post void residual bladder scan showed 194 ml. Patient got up to the restroom x1 to void.    Patient received 1,000 mL Bolus of Lactated Ringers x1 this shift. Tolerated well.    Patient slightly tachycardic this shift. HS temperature was 99.7. PRN Tylenol x1 given. Follow-up temperature was 100.0. See Vitals Flowsheet for details. Will continue to monitor.

## 2021-11-05 NOTE — PLAN OF CARE
"  Problem: Behavioral Health Plan of Care  Goal: Plan of Care Review  Recent Flowsheet Documentation  Taken 11/5/2021 1400 by Lynn Singh RN  Plan of Care Reviewed With: patient    S:  Patient is at risk for dehydration, d/t poor oral intake, AEB eating <25% of meals and hypotension.     B:  Patient had a recent behavioral change.  Patient c/o \"I'm tired\"  Patient does not ambulate to the dining room independently.  Patient has refused meals. Encouragement to ambulate to a wheelchair or increase oral intake is successful 25% of the time.      A:  Patient facial appearance is gaunt.  Patient has a white coating on his tongue and urine is dark franko.       R: Patient meals are brought to the room and staff assist patient with sitting up in bed, offering food and fluids, assist to the bathroom as needed, provide oral and hygiene care.  Document intake and output.  Patient is followed by Internal Medicine. New order are received, labs, IV fluid bolus, calorie counts and Ethics Consult. Please reviewRecommendation includes staff that complete 15 min checks, use this opportunity to offer the patient fluids.  Continue to monitor.      "

## 2021-11-05 NOTE — PLAN OF CARE
Assessment/Intervention/Current Symtoms and Care Coordination  -Chart review  -Pre round meeting with team  -Rounded with team, addressed patient needs/concerns  -Post round meeting with team  Current Symptoms include the following: Psychosis      Pt has not eating or drinking.  Pt is isolating in his room.      11/5/21  I called the [facility] ([place]) @[phone]. the intake line was closed. The hours are M-Th 9:30 to 11:30 and 1:30 to 3:30. On Fridays only 9:30 to 11:30.  I will call on Monday.  I called the [facility] @ [phone]. I left a voice mail.      Nursing Home Work Sheet - [facility]  [phone]/Fax  [phone] [address] [facility] [address]  Has locked unit    Locked unit    Need dementia diagnosis  8/3 - Per Zulma, Soniya says they need dementia diagnosis and no beds currently   [facility]  [phone]/fax [phone]  [address] [place] [address]  Has locked unit    Locked unit 8/3 - Per [facility], no beds available   [facility]  [phone]/ Fax [phone]  [address] [place] [address]  Has a locked unit   [facility]  [phone]  [phone]     Locked Unit  8/5/21 - left a  for [facility]     Phone:  [phone]  Fax: [phone] Hard to place pt s      Have locked memory care unit.      requiring vaccination 7/15/21 @ 4PM  I spoke with Admissions. They have one locked bed for a male who is vaccinated. Pt is not vaccinated.   8/24/21  Pt vaccinated. CTC called and there are no beds available.   [facility] Phone: [phone]  7/15/21 @ 4:15PM  Liz in Admission says they have no locked bed and there is a lake right outside the facility.   [facility]  [address] [facility]  ADRIAN Riddle @[phone]    [email or web address]  Jeremias gave me this contact as someone who agreed to take this  pt.     7/12/21@2:15PM  Left vm for Nncherryma   3:30PM  Received call back. She said they would take him with the commitment and the rep payee.   However after we hung up I realized I didn t clarify the setting type. I e-mailed her to get this information. It looks like it could be only customized living. She wrote back and said this:  This is a customized living facility with a 24/7 awake  staff. It has a potential for a surveillance camera placement but it s not a locked unit.   Thanks      Company:[employer]   Facility:[address] [address]      Transition Liaison:  Marilyn  [phone]  Fax: [phone]      Direct to Facility  [phone]    Jeremias gave me this contact as someone who agreed to take this pt.     7/12/21@10:15AM   left vm for Marilyn  3:45 called again but did not leave vm    7/13/21@2pm   left vm. Marilyn called me back. She said that they had denied this case last year due to the pt s aggression. I explained the administrative proposal and that the pt may be improved since then. She agreed to talk to the  at [facility] re: the administrative proposal and I agrees to send notes to show then updated clinical information. I faxed notes.      7/15/21   @9:20AM  Left vm for Marilyn for an update  @9:25  Left vm for Becky in  on the 2nd floor  Marilyn called back and said that the  is still requiring that the pt have a guardian.   [facility]  [phone]  7/12/21@10:30AM   I reached Jimmy live and explained the situation. She said they still would not take the pt even if under a commitment and Yanez. She states it is a state mandate especially in a secure unit.     [facility]        No locked unit Pilar  [phone]  7/12/21@10:40AM   Pilar said they do not have a locked unit. She recommends their sister facility [facility] which has a wander-guard system. I discussed this with  the provider and we both agree that the pt is not appropriate for a wander-guard system.    7/15/21 @1:45PM  TCM (Kurtis) is going to talk to [facility] about this client as we may have to consider other options since the locked secure units won t take him with out a decision maker.     [place]      No locked unit, it is a secure unit with wanderguard [phone]  Cell: [phone]  7/12/21 @ 2:50PM  Could not reach the right staff  3:50PM left vm for admissions staff. They called back and said there is No locked unit, it is a secure unit with wanderguard       [place]      No locked unit   7/12/21 @ 3:05PM  Was told there was no locked unit   [facility]      No locked unit [phone]  Fax:[phone]  7/12/21 @ 2:40PM  They do not have a locked unit.   [facility]    7/12/21 - Called,  Neurocognitive Program  no locked unit   [facility] [phone]  7/12/21 - Called  Neurocognitive Program. Left VM.     [facility] [address]  7/12/21 - Called,  Neurocognitive Program  no locked unit   [facility] [phone]     [facility] [phone]     [facility] [phone] Fax:[phone]    [facility] [phone]       [facility]  [phone]  Cell:[phone] Fax:[phone]    [facility] [phone] Fax:[phone]                    Discharge Plan or Goal  Pending  development of a safe discharge plan.  Considerations include: nursing home    Barriers to Discharge  Patient requires further psychiatric stabilization due to current symptomology    Referral Status  [facility] referalls paused while looking for a guardian    Legal Status  Patient is under MI commitment in [facility]

## 2021-11-05 NOTE — PROGRESS NOTES
Brief Medicine Note    Medicine following up on results. Please see initial consult note and follow up note from 11/4/21 for details.     ROUTINE IP LABS (Last four results)  CMP Recent Labs   Lab 11/04/21  1735      POTASSIUM 3.5   CHLORIDE 108   CO2 24   ANIONGAP 7   *   BUN 15   CR 0.89   LESLEE 9.4   MAG 2.1   PHOS 4.4   PROTTOTAL 8.1   ALBUMIN 2.9*   BILITOTAL 0.5   ALKPHOS 89   AST 76*   ALT 37     CBC   Recent Labs   Lab 11/04/21  1735   WBC 7.9   RBC 5.12   HGB 15.7   HCT 46.3   MCV 90   MCH 30.7   MCHC 33.9   RDW 12.5        INR No lab results found in last 7 days.    Other: A.m. cortisol 20.9.  Folate 10.4.  B12 49.  Vitamin D 99.  TSH 2.71.  Troponin less than 0.015.  NT proBNP 254.  Absolute eosinophil count 1.5.  Covid 19 PCR negative. CRP 31.0. Sed Rate 38.     CXR: No acute air opacities. Radiographic changes of COPD     Assessment/plan:   John Juárez is a 56 yo M with PMHx Schizophrenia, hx of TBI, alcohol use disorder, COPD, HLD, Hx of cardiomyopathy that has since resolved (EF 50% 2016), hx of endocarditis s/p bioprosthetic valve replacement 10/2015, Hx of CVA, and Hypothyroidism who was initially admitted to [facility] pm 9/9/2020 for evaluation of aggressive behavior at his group home, with a prolonged hospitalization due to difficulties finding guardianship. Medicine asked to evaluate for failure to thrive.     #Failure to thrive- Patient with poor PO intake, not getting out of bed and with 18 lb weight loss. Patient denies any focal symptoms. Does not state why he won't eat, but denies any difficulty with swallowing or N/V. VSS, with mild tachycardia. Afebrile. Patient with significant work up 2 weeks ago for fever, which was negative and fevers have since resolved. Repeat work up with similarly elevated CRP, but no leukocytosis, electrolyte abnormalities, or renal failure. CXR clear. Ruled out adrenal insuffiencey with normal AM cortisol. Reviewed case with  cardiology with elevated inflammatory markers, tachycardia, fatigue and recent fevers if this could be a possible Clozaril cardiomyopathy, but with normal troponin, that is ruled out.  Suspect this is likely due to depression or hopelessness due to prolonged hospital stay. Ethics and psych considering if patient is developing severe dementia.   - Follow up quant gold   - Follow up UA (straight cath ordered), and repeat blood cultures   - 1L of LR for tachycardia and poor PO intake   - May consider hernandez CT for possible underlying malignancy if work up negative   - Consider starting Remeron for possible depression and appetite stimulant, consider Marinol for appetite stimulant, or adderall   - Ethics recommended palliative care consult and neurocognitive evaluation      #Severe malnutrition - Nutrition consulted and following. Patient with 18 lb weight loss. Patient would benefit from additional nutrition, like enteral nutrition via NJ though worry about patient removing tube. TPN via PICC would be very risky due to infections. Could consider PEG. Unfortunately patient is not decisional and no guardian in place to assist in making decisions.   - Ethics consulted, appreciate recs, please see their note for details   - Nutrition consulted, appreciate assistance  - Calorie counts    - as ethics note mentions - Will need multi-disciplinary discussion between psych, medicine, ethics, palliative, etc  to determine if patient has underlying severe dementia contributing. If so ethically would not be permissible to pursue tube feedings. If no concern for severe dementia and patient deemed likely to survive hospitalization will consider placing NJ next week if continues to have poor PO intake.     Medicine follow up Quant gold, UA, blood cultures. Case discussed with Dr. Justin Martinez. No further recommendations at this time. Please page the on-call SHREE for any intercurrent medical issues which arise.     Edith Alfaro,  MADHAVI  Hospitalist Service  Contact information available via [facility] Paging/Directory

## 2021-11-06 ENCOUNTER — APPOINTMENT (OUTPATIENT)
Dept: GENERAL RADIOLOGY | Facility: CLINIC | Age: 56
DRG: 885 | End: 2021-11-06
Attending: PHYSICIAN ASSISTANT
Payer: COMMERCIAL

## 2021-11-06 LAB
ANION GAP SERPL CALCULATED.3IONS-SCNC: 4 MMOL/L (ref 3–14)
BUN SERPL-MCNC: 16 MG/DL (ref 7–30)
CALCIUM SERPL-MCNC: 8.9 MG/DL (ref 8.5–10.1)
CHLORIDE BLD-SCNC: 108 MMOL/L (ref 94–109)
CO2 SERPL-SCNC: 27 MMOL/L (ref 20–32)
CREAT SERPL-MCNC: 0.78 MG/DL (ref 0.66–1.25)
ERYTHROCYTE [DISTWIDTH] IN BLOOD BY AUTOMATED COUNT: 12.7 % (ref 10–15)
GFR SERPL CREATININE-BSD FRML MDRD: >90 ML/MIN/1.73M2
GLUCOSE BLD-MCNC: 157 MG/DL (ref 70–99)
HCT VFR BLD AUTO: 41.1 % (ref 40–53)
HGB BLD-MCNC: 13.9 G/DL (ref 13.3–17.7)
MAGNESIUM SERPL-MCNC: 1.9 MG/DL (ref 1.6–2.3)
MCH RBC QN AUTO: 30.8 PG (ref 26.5–33)
MCHC RBC AUTO-ENTMCNC: 33.8 G/DL (ref 31.5–36.5)
MCV RBC AUTO: 91 FL (ref 78–100)
PLATELET # BLD AUTO: 258 10E3/UL (ref 150–450)
POTASSIUM BLD-SCNC: 3.6 MMOL/L (ref 3.4–5.3)
RBC # BLD AUTO: 4.51 10E6/UL (ref 4.4–5.9)
SODIUM SERPL-SCNC: 139 MMOL/L (ref 133–144)
WBC # BLD AUTO: 8.2 10E3/UL (ref 4–11)

## 2021-11-06 PROCEDURE — 250N000013 HC RX MED GY IP 250 OP 250 PS 637: Performed by: PHYSICIAN ASSISTANT

## 2021-11-06 PROCEDURE — 71046 X-RAY EXAM CHEST 2 VIEWS: CPT | Mod: 26 | Performed by: RADIOLOGY

## 2021-11-06 PROCEDURE — 250N000013 HC RX MED GY IP 250 OP 250 PS 637: Performed by: PSYCHIATRY & NEUROLOGY

## 2021-11-06 PROCEDURE — 124N000002 HC R&B MH UMMC

## 2021-11-06 PROCEDURE — 83735 ASSAY OF MAGNESIUM: CPT | Performed by: PHYSICIAN ASSISTANT

## 2021-11-06 PROCEDURE — 99232 SBSQ HOSP IP/OBS MODERATE 35: CPT | Performed by: PHYSICIAN ASSISTANT

## 2021-11-06 PROCEDURE — 85027 COMPLETE CBC AUTOMATED: CPT | Performed by: PHYSICIAN ASSISTANT

## 2021-11-06 PROCEDURE — 36415 COLL VENOUS BLD VENIPUNCTURE: CPT | Performed by: PHYSICIAN ASSISTANT

## 2021-11-06 PROCEDURE — 71046 X-RAY EXAM CHEST 2 VIEWS: CPT

## 2021-11-06 PROCEDURE — 80048 BASIC METABOLIC PNL TOTAL CA: CPT | Performed by: PHYSICIAN ASSISTANT

## 2021-11-06 RX ADMIN — CLOZAPINE 350 MG: 100 TABLET ORAL at 20:49

## 2021-11-06 RX ADMIN — MEMANTINE 5 MG: 5 TABLET ORAL at 20:49

## 2021-11-06 RX ADMIN — LEVOTHYROXINE SODIUM 88 MCG: 88 TABLET ORAL at 08:54

## 2021-11-06 RX ADMIN — ASPIRIN 81 MG CHEWABLE TABLET 81 MG: 81 TABLET CHEWABLE at 08:54

## 2021-11-06 RX ADMIN — Medication 50 MCG: at 08:54

## 2021-11-06 RX ADMIN — METOPROLOL TARTRATE 25 MG: 25 TABLET, FILM COATED ORAL at 20:49

## 2021-11-06 RX ADMIN — ATORVASTATIN CALCIUM 80 MG: 80 TABLET, FILM COATED ORAL at 20:49

## 2021-11-06 RX ADMIN — SALMETEROL XINAFOATE 1 PUFF: 50 POWDER, METERED ORAL; RESPIRATORY (INHALATION) at 08:55

## 2021-11-06 RX ADMIN — METOPROLOL TARTRATE 25 MG: 25 TABLET, FILM COATED ORAL at 08:54

## 2021-11-06 RX ADMIN — MEMANTINE 5 MG: 5 TABLET ORAL at 08:54

## 2021-11-06 ASSESSMENT — ACTIVITIES OF DAILY LIVING (ADL)
DRESS: SCRUBS (BEHAVIORAL HEALTH)
ORAL_HYGIENE: WITH ASSISTANCE
HYGIENE/GROOMING: WITH ASSISTANCE
LAUNDRY: UNABLE TO COMPLETE
DRESS: WITH ASSISTANCE
HYGIENE/GROOMING: WITH ASSISTANCE
LAUNDRY: UNABLE TO COMPLETE
ORAL_HYGIENE: WITH ASSISTANCE

## 2021-11-06 ASSESSMENT — MIFFLIN-ST. JEOR: SCORE: 1685

## 2021-11-06 NOTE — PLAN OF CARE
Patient presents with a blunted affect. Mood presents as calm. Is cooperative. Speech is soft and mumbled. Eye contact is fair. Alert x1, self only. Patient denies depression, anxiety, suicidal ideation, SIB, and homicidal ideation. Patient denies auditory/visual hallucinations, yet appears to be responding to internal stimuli; as evidenced by, staring into space and lips moving as if talking to imaginary person or persons. Somewhat medication compliant. Refused HS Salmeterol inhaler. No medication cheeking noted. Patient's oral intake continues to be poor. Refused evening meal. This writer attempted to encourage patient to eat his evening meal, but he refused. Drank 355 ml's of Ensure and 532 ml's of fluids for this shift. Continues on calorie count. This writer and staff continue to frequently check on patient and encourage him to eat and drink. Patient spent the entire shift in his room in bed. No interaction with staff or peers noted.     /76 (BP Location: Right arm, Patient Position: Semi-Nobles's)   Pulse 101   Temp 98.6  F (37  C) (Oral)   Resp 18   Ht 1.829 m (6')   Wt 82.2 kg (181 lb 3.5 oz)   SpO2 92%   BMI 24.58 kg/m      Patient slightly tachycardic x1 this shift. All other vital signs were WNL. See Vitals Flowsheet for details.     Patient refused to use the Incentive Spirometer this shift.     Patient got up on his own, walked to his restroom, and voided x1 this shift with this writer in attendance.

## 2021-11-06 NOTE — PLAN OF CARE
Problem: Behavioral Health Plan of Care  Goal: Plan of Care Review  Recent Flowsheet Documentation  Taken 11/6/2021 1000 by Lynn Singh RN  Plan of Care Reviewed With: patient  Patient Agreement with Plan of Care: unable to participate       Patient is resting in bed.  Eyes spontaneously opens.  Vital signs are taken.  O2 sats are abnormal.  The Hospitalist is called.  New orders are received.  Patient c/o of having difficulty chewing food.  Patient has poor oral intake.  Writer put in a diet order of Mechanical Soft diet in hopes this increases patient appetite.  Staff has increased the frequency of checking in the the patient.  Patient is offered liquid and meals.  Patient has received 250 mls of Ensure.  Ate 3 small bites of chicken and 78% of mandrin oranges. Patients O2 is 93% and /71, Pulse 83.  Patient was repositioned this morning when transferred to a cart for transportation to Radiology.  Patient refused any other repositioning.  Patient has not exhibited unsafe behaviors this shift.  Continue with plan of care.

## 2021-11-06 NOTE — PROVIDER NOTIFICATION
11/06/21 0800   Vital Signs   Temp 99.1  F (37.3  C)   Temp src Temporal   Pulse 91   Pulse Rate Source Monitor   /76   BP Location Left arm   Patient Position Sitting   Sitting Orthostatic BP   Sitting Orthostatic /76   Sitting Orthostatic Pulse 91 bpm   Oxygen Therapy   SpO2 92 %       PA notified RN, patients O2 sats are at 90%.  O2 sats are assessed.  Initial result is 88%.  With repositioning patient with HOB >30, O2 sats reached 92%.  Patients temp 99.1.  Oncall Hospitalist is paged.

## 2021-11-06 NOTE — PLAN OF CARE
"Pt up to the bathroom once during the night.  UOP appeared to be approximately 100 mL.  Pt slept approximately 7 hours and remains resting in bed when approached for VS assessment.    BP (!) 83/60 (BP Location: Right arm, Patient Position: Supine)   Pulse 85   Temp 98.3  F (36.8  C) (Oral)   Resp 18   Ht 1.829 m (6')   Wt 81.1 kg (178 lb 11.2 oz)   SpO2 93%   BMI 24.24 kg/m    Denies pain.  Pt does state that he feels cold as staff peeled away the blankets to do assessments.  Affect is irritable.  \"Come ON.  Hurry up!\"  but Pt did not threaten staff.  He did drink 100  mL of water and 120 mL of OJ with encouragement this morning.    "

## 2021-11-06 NOTE — PROGRESS NOTES
Internal Medicine Progress Note      Assessment and plan:  John Juárez is a 55 year old male admitted on 6/30/2021. He has a history of schizophrenia, TBI, alcohol use disorder, COPD, HLD, cardiomyopathy (now resolved), endocarditis s/p bioprosthetic valve replacement (2015), CVA, and hypothyroidism. Please see initial IM consult from 7/1/21 for details regarding chronic medical conditions. Medicine closely following over the past several weeks due to failure to thrive.      Severe protein calorie malnutrition, failure to thrive: See detailed medicine notes 11/5 and prior for complete details. In short, 18 lb weight loss. Quant gold negative. Would benefit from additional nutrition, such as NJ TF, though worry about patient removing tube. TPN via PICC very risky due to infections. Could consider PEG. Unfortunately patient is not decisional and no guardian in place to assist in making decisions.  Etiology thought 2/2 depression and prolonged hospital stay  - Nutrition following, calorie counts    - As ethics note mentions - will need multi-disciplinary discussion between psych, medicine, ethics, palliative, etc to determine if patient has underlying severe dementia contributing. If so ethically would not be permissible to pursue tube feedings. If no concern for severe dementia and patient deemed likely to survive hospitalization will consider placing NJ next week if continues to have poor PO intake   - Follow BCx  - May consider hernandez CT for possible underlying malignancy if work up negative   - Consider starting Remeron for possible depression and appetite stimulant, consider Marinol for appetite stimulant, or adderall   - Ethics recommended palliative care consult and neurocognitive evaluation     Transient hypoxia: O2 sats 89% while in bed this am. Improved with 92% with deep breathing. Patient denies dyspnea, chest pain, cough (although noted to cough while in the room with writer). No fever or chills. WBC  "normal. CXR no acute findings. COVID negative 11/4. O2 sats 93-95% while in the room for interview. Upper airway congestion noted  - IS, acapella  - Continue close monitoring, contact medicine if O2 sats <90% while in bed    Medicine will continue to closely follow    Objective:  /76 (BP Location: Left arm, Patient Position: Sitting)   Pulse 91   Temp 99.1  F (37.3  C) (Temporal)   Resp 18   Ht 1.829 m (6')   Wt 81.1 kg (178 lb 11.2 oz)   SpO2 95%   BMI 24.24 kg/m      Vitals signs reviewed and noted    GENERAL: Alert and oriented x3  HEENT: Anicteric sclera. MMM  CV: RRR, S1, S2. No murmur noted  RESPIRATORY: Effort normal on room air, sats 93-95%. Upper airway (tracheal congestion). Lungs CTAB with no wheezing or rales  GI: Abdomen soft, non-tender with active bowel sounds. No rebound or guarding  NEUROLOGICAL: No focal deficits. Moves all extremities  EXTREMITIES: No peripheral edema. Intact bilateral pedal pulses  SKIN: No jaundice, no rash    Pertinent labs and procedures were reviewed     Subjective:   Reports feeling \"fine.\" denies dyspnea, chest pain, cough, fever, chills. Appetite is poor. Not interested in getting out of bed or eating. Denies AMS. States that he, \"Does not fucking belong here\" and that he is being held against his will      Twyla Kumar PA-C  Internal Medicine  [phone]      "

## 2021-11-06 NOTE — PROGRESS NOTES
Patient has new orders for a 2 View Chest Xray.  Patient is placed on a gurney and escorted with 2 RN's and Security, to Radiology.  Patient was able to follow directions and allow imaging to be obtained.  Patient tolerated the procedure and returned to his room without incident.

## 2021-11-07 ENCOUNTER — APPOINTMENT (OUTPATIENT)
Dept: CT IMAGING | Facility: CLINIC | Age: 56
DRG: 885 | End: 2021-11-07
Attending: PHYSICIAN ASSISTANT
Payer: COMMERCIAL

## 2021-11-07 LAB
ALBUMIN SERPL-MCNC: 2.5 G/DL (ref 3.4–5)
ALP SERPL-CCNC: 66 U/L (ref 40–150)
ALT SERPL W P-5'-P-CCNC: 32 U/L (ref 0–70)
ANION GAP SERPL CALCULATED.3IONS-SCNC: 3 MMOL/L (ref 3–14)
AST SERPL W P-5'-P-CCNC: 74 U/L (ref 0–45)
BILIRUB SERPL-MCNC: 0.5 MG/DL (ref 0.2–1.3)
BUN SERPL-MCNC: 15 MG/DL (ref 7–30)
CALCIUM SERPL-MCNC: 9.8 MG/DL (ref 8.5–10.1)
CHLORIDE BLD-SCNC: 110 MMOL/L (ref 94–109)
CO2 SERPL-SCNC: 25 MMOL/L (ref 20–32)
CREAT SERPL-MCNC: 0.88 MG/DL (ref 0.66–1.25)
D DIMER PPP FEU-MCNC: 1.8 UG/ML FEU (ref 0–0.5)
ENTEROCOCCUS FAECALIS: NOT DETECTED
ENTEROCOCCUS FAECIUM: NOT DETECTED
ERYTHROCYTE [DISTWIDTH] IN BLOOD BY AUTOMATED COUNT: 12.8 % (ref 10–15)
GFR SERPL CREATININE-BSD FRML MDRD: >90 ML/MIN/1.73M2
GLUCOSE BLD-MCNC: 121 MG/DL (ref 70–99)
HCT VFR BLD AUTO: 41.3 % (ref 40–53)
HGB BLD-MCNC: 14.2 G/DL (ref 13.3–17.7)
LACTATE SERPL-SCNC: 1.2 MMOL/L (ref 0.7–2)
LISTERIA SPECIES (DETECTED/NOT DETECTED): NOT DETECTED
MCH RBC QN AUTO: 30.8 PG (ref 26.5–33)
MCHC RBC AUTO-ENTMCNC: 34.4 G/DL (ref 31.5–36.5)
MCV RBC AUTO: 90 FL (ref 78–100)
PLATELET # BLD AUTO: 268 10E3/UL (ref 150–450)
POTASSIUM BLD-SCNC: 3.8 MMOL/L (ref 3.4–5.3)
PROT SERPL-MCNC: 7.3 G/DL (ref 6.8–8.8)
RBC # BLD AUTO: 4.61 10E6/UL (ref 4.4–5.9)
SODIUM SERPL-SCNC: 138 MMOL/L (ref 133–144)
STAPHYLOCOCCUS AUREUS: NOT DETECTED
STAPHYLOCOCCUS EPIDERMIDIS: NOT DETECTED
STAPHYLOCOCCUS LUGDUNENSIS: NOT DETECTED
STAPHYLOCOCCUS SPECIES: DETECTED
STREPTOCOCCUS AGALACTIAE: NOT DETECTED
STREPTOCOCCUS ANGINOSUS GROUP: NOT DETECTED
STREPTOCOCCUS PNEUMONIAE: NOT DETECTED
STREPTOCOCCUS PYOGENES: NOT DETECTED
STREPTOCOCCUS SPECIES: NOT DETECTED
WBC # BLD AUTO: 8.8 10E3/UL (ref 4–11)

## 2021-11-07 PROCEDURE — 124N000002 HC R&B MH UMMC

## 2021-11-07 PROCEDURE — 71275 CT ANGIOGRAPHY CHEST: CPT | Mod: 26 | Performed by: RADIOLOGY

## 2021-11-07 PROCEDURE — 85027 COMPLETE CBC AUTOMATED: CPT | Performed by: PHYSICIAN ASSISTANT

## 2021-11-07 PROCEDURE — 250N000009 HC RX 250: Performed by: PSYCHIATRY & NEUROLOGY

## 2021-11-07 PROCEDURE — 250N000013 HC RX MED GY IP 250 OP 250 PS 637: Performed by: PHYSICIAN ASSISTANT

## 2021-11-07 PROCEDURE — 83605 ASSAY OF LACTIC ACID: CPT | Performed by: PHYSICIAN ASSISTANT

## 2021-11-07 PROCEDURE — 250N000013 HC RX MED GY IP 250 OP 250 PS 637: Performed by: PSYCHIATRY & NEUROLOGY

## 2021-11-07 PROCEDURE — 85379 FIBRIN DEGRADATION QUANT: CPT | Performed by: PHYSICIAN ASSISTANT

## 2021-11-07 PROCEDURE — 82374 ASSAY BLOOD CARBON DIOXIDE: CPT | Performed by: PHYSICIAN ASSISTANT

## 2021-11-07 PROCEDURE — 71275 CT ANGIOGRAPHY CHEST: CPT

## 2021-11-07 PROCEDURE — 250N000011 HC RX IP 250 OP 636: Performed by: PSYCHIATRY & NEUROLOGY

## 2021-11-07 PROCEDURE — 87040 BLOOD CULTURE FOR BACTERIA: CPT | Performed by: PHYSICIAN ASSISTANT

## 2021-11-07 PROCEDURE — 36415 COLL VENOUS BLD VENIPUNCTURE: CPT | Performed by: PHYSICIAN ASSISTANT

## 2021-11-07 PROCEDURE — 258N000003 HC RX IP 258 OP 636: Performed by: PHYSICIAN ASSISTANT

## 2021-11-07 RX ORDER — IOPAMIDOL 755 MG/ML
100 INJECTION, SOLUTION INTRAVASCULAR ONCE
Status: COMPLETED | OUTPATIENT
Start: 2021-11-07 | End: 2021-11-07

## 2021-11-07 RX ADMIN — CLOZAPINE 350 MG: 100 TABLET ORAL at 20:36

## 2021-11-07 RX ADMIN — SALMETEROL XINAFOATE 1 PUFF: 50 POWDER, METERED ORAL; RESPIRATORY (INHALATION) at 08:11

## 2021-11-07 RX ADMIN — ATORVASTATIN CALCIUM 80 MG: 80 TABLET, FILM COATED ORAL at 20:36

## 2021-11-07 RX ADMIN — SODIUM CHLORIDE 84 ML: 9 INJECTION, SOLUTION INTRAVENOUS at 19:27

## 2021-11-07 RX ADMIN — MEMANTINE 5 MG: 5 TABLET ORAL at 20:36

## 2021-11-07 RX ADMIN — METOPROLOL TARTRATE 25 MG: 25 TABLET, FILM COATED ORAL at 08:10

## 2021-11-07 RX ADMIN — MEMANTINE 5 MG: 5 TABLET ORAL at 08:11

## 2021-11-07 RX ADMIN — SODIUM CHLORIDE, POTASSIUM CHLORIDE, SODIUM LACTATE AND CALCIUM CHLORIDE 1000 ML: 600; 310; 30; 20 INJECTION, SOLUTION INTRAVENOUS at 09:08

## 2021-11-07 RX ADMIN — LEVOTHYROXINE SODIUM 88 MCG: 88 TABLET ORAL at 08:11

## 2021-11-07 RX ADMIN — Medication 50 MCG: at 08:11

## 2021-11-07 RX ADMIN — SALMETEROL XINAFOATE 1 PUFF: 50 POWDER, METERED ORAL; RESPIRATORY (INHALATION) at 20:36

## 2021-11-07 RX ADMIN — ASPIRIN 81 MG CHEWABLE TABLET 81 MG: 81 TABLET CHEWABLE at 08:11

## 2021-11-07 RX ADMIN — METOPROLOL TARTRATE 25 MG: 25 TABLET, FILM COATED ORAL at 20:36

## 2021-11-07 RX ADMIN — IOPAMIDOL 64 ML: 755 INJECTION, SOLUTION INTRAVENOUS at 19:27

## 2021-11-07 ASSESSMENT — ACTIVITIES OF DAILY LIVING (ADL)
ORAL_HYGIENE: INDEPENDENT
DRESS: WITH ASSISTANCE
ORAL_HYGIENE: WITH ASSISTANCE
LAUNDRY: UNABLE TO COMPLETE
HYGIENE/GROOMING: WITH ASSISTANCE
HYGIENE/GROOMING: WITH ASSISTANCE
LAUNDRY: UNABLE TO COMPLETE
DRESS: WITH ASSISTANCE

## 2021-11-07 NOTE — PROGRESS NOTES
"[facility] GP panel was ordered by PA this morning.   Awaiting further lab results.  On call MD was notified of blood Cx results.  Pt not diaphoretic and no evidence of rigors.  Awake at 5 am but declined prompt for BR use. VS:   BP (!) 83/63 (BP Location: Right arm, Patient Position: Supine)   Pulse 89   Temp 99.1  F (37.3  C) (Oral)   Resp 18   Ht 1.829 m (6')   Wt 82.2 kg (181 lb 3.5 oz)   SpO2 94%   BMI 24.58 kg/m        Pt sipping on orange juice with encouragement.  (PO intake 100mL at this time).  He then stated \"Can I go home now?\".  Denies pain.  Requesting to be left alone so he can sleep.  Will continue to monitor and assess.  Continue to encourage PO intake and OOB activity.    "

## 2021-11-07 NOTE — PLAN OF CARE
"Patient presents with a blunted affect. Mood presents as irritable yet calm. Is cooperative. Speech is soft and mumbled. Eye contact is fair. Alert x1, self only. Patient asked what the date today is and was repetedly asking where he is. Patient denies depression, anxiety, suicidal ideation, SIB, and homicidal ideation. Patient denies auditory/visual hallucinations, yet appears to be responding to internal stimuli; as evidenced by, staring into space and lips moving as if talking to imaginary person or persons. Medication compliant. Vital signs stable this shift. Patient held oral temperature probe on his own. No medication cheeking noted. Patient's oral intake continues to be poor. Refused evening meal. This writer attempted to encourage patient to eat his evening meal, but he refused. Drank 474 ml's of Ensure and 649 ml's of fluids for this shift. Continues on calorie count. This writer and staff continue to frequently check on patient and encourage him to eat and drink. Patient spent the entire shift in his room in bed. No interaction with staff or peers noted.     BP (!) 126/90   Pulse 87   Temp 98.5  F (36.9  C) (Oral)   Resp 16   Ht 1.829 m (6')   Wt 82.2 kg (181 lb 3.5 oz)   SpO2 93%   BMI 24.58 kg/m       Patient refused to use the Incentive Spirometer this shift.     Patient went for a CT Chest Pulmonary Embolism w/Contrast scan this shift via wheelchair. Per CT staff an 18 gauge IV catheter was inserted into left antecubital for the procedure. Patient tolerated procedure well. Results of CT: No pulmonary embolism noted. Possible mild pulmonary edema. See Results Review for further details.     At approximately 2130 patient got out of bed, walked into the hallway, and started yelling about a \"treatment center\". Staff approached patient and asked if they could help. Patient yelled about \"treatment center\" again, told staff to ask about it at the , began swearing, turned around and went back " to bed.

## 2021-11-07 NOTE — PROGRESS NOTES
Blood culture indicates possible collection associated skin contaminant.    [facility] GP panel result:  Positive for coagulase-negative Staphylococci, other than Staphylococcus epidermidis and Staphylococcus lugdunensis, by [facility] multiplex nucleic acid test. Coagulase-negative Staphylococci are the most common venipuncture or collection associated skin contaminants grown in blood cultures. Final identification and antimicrobial susceptibility testing will be verified by standard methods.

## 2021-11-07 NOTE — PROGRESS NOTES
Brief Medicine Note    John Juárez is a 55 year old male admitted on 6/30/2021. He has a history of schizophrenia, TBI, alcohol use disorder, COPD, HLD, cardiomyopathy (now resolved), endocarditis s/p bioprosthetic valve replacement (2015), CVA, and hypothyroidism. Please see initial IM consult from 7/1/21 for details regarding chronic medical conditions. Medicine closely following over the past several weeks due to failure to thrive. Please see prior notes for full history and details.      #Severe protein calorie malnutrition, failure to thrive: See detailed medicine notes 11/5 and prior for complete details. In short, 18 lb weight loss. Quant gold negative. Would benefit from additional nutrition, such as NJ TF, though worry about patient removing tube. TPN via PICC very risky due to infections. Could consider PEG. Unfortunately patient is not decisional and no guardian in place to assist in making decisions.  Etiology thought 2/2 depression and prolonged hospital stay  - Nutrition following, calorie counts    - As ethics note mentions - will need multi-disciplinary discussion between psych, medicine, ethics, palliative, etc to determine if patient has underlying severe dementia contributing. If so ethically would not be permissible to pursue tube feedings. If no concern for severe dementia and patient deemed likely to survive hospitalization will consider placing NJ next week if continues to have poor PO intake   - Follow BCx  - May consider hernandez CT for possible underlying malignancy if work up negative   - Consider starting Remeron for possible depression and appetite stimulant, consider Marinol for appetite stimulant, or adderall   - Ethics recommended palliative care consult and neurocognitive evaluation      #Transient hypoxia: O2 sats 89% while in bed this am. Improved with 92% with deep breathing. Patient denies dyspnea, chest pain, cough (although noted to cough while in the room with writer). No  fever or chills. WBC normal. CXR no acute findings. COVID negative 11/4. Patient developed transient hypoxia again today, with tachycardia and tachypnea.   - IS, acapella  - Continue close monitoring, contact medicine if O2 sats <90% while in bed  - with transient hypoxia again this AM, tachycardia and tachypnea, and elevated D-dimer will obtain CTA chest    #Positive blood cultures- 1/2 bottles positive for gram + cocci with verigene GP panel positive for staphylococcus species likely contaminant from skin deana.   - Repeat blood cultures  - If patient develops fever to 100.4F or above please notify medicine and will likely need to be transferred to medicine for IV abx. Hold abx for now      #Major neurocognitive disorder dt hx of TBI and alcohol use disorder  #Schizophrenia  #Under commitment  Had been residing in group home prior to admission.  Brought to hospital for evaluation of aggressive behaviors. Group home now unwilling to take him back. Has had numerous CODE 21s called this stay. Seen by psych, meds adjusted. Is currently under civil commitment and court hold through [facility] until 7/31/21. Please see recent discharge summary for details on 6/30/20201.   -Management per psychiatry     Medicine will follow up on CT chest and repeat blood cultures. Case discussed with Dr. Justin Martinez. No further recommendations at this time. Please page the on-call SHREE for any intercurrent medical issues which arise.     Edith Alfaro PA-C  Hospitalist Service  Contact information available via [facility] Paging/Directory

## 2021-11-07 NOTE — PROGRESS NOTES
Lab called at 0138 stating that pt's L hand blood draw from 11/5 @ 4618 shows gram positive cocci in clusters. They state they will call back in a couple hours to let staff know specifics. Assigned RN notified and will contact provider as needed.

## 2021-11-07 NOTE — PROGRESS NOTES
Pt's breathing noted to be somewhat shallow and fast during 0715 rounds. Writer watched pt for 60 seconds and pt's RR was 22. Assigned RN informed.

## 2021-11-08 ENCOUNTER — ANCILLARY PROCEDURE (OUTPATIENT)
Dept: NEUROLOGY | Facility: CLINIC | Age: 56
End: 2021-11-08
Attending: PSYCHIATRY & NEUROLOGY
Payer: COMMERCIAL

## 2021-11-08 PROCEDURE — 250N000013 HC RX MED GY IP 250 OP 250 PS 637: Performed by: PSYCHIATRY & NEUROLOGY

## 2021-11-08 PROCEDURE — 99233 SBSQ HOSP IP/OBS HIGH 50: CPT | Performed by: PSYCHIATRY & NEUROLOGY

## 2021-11-08 PROCEDURE — 99232 SBSQ HOSP IP/OBS MODERATE 35: CPT | Performed by: PHYSICIAN ASSISTANT

## 2021-11-08 PROCEDURE — 95816 EEG AWAKE AND DROWSY: CPT

## 2021-11-08 PROCEDURE — 99232 SBSQ HOSP IP/OBS MODERATE 35: CPT | Performed by: PSYCHIATRY & NEUROLOGY

## 2021-11-08 PROCEDURE — 95816 EEG AWAKE AND DROWSY: CPT | Mod: 26 | Performed by: PSYCHIATRY & NEUROLOGY

## 2021-11-08 PROCEDURE — 124N000002 HC R&B MH UMMC

## 2021-11-08 PROCEDURE — 250N000013 HC RX MED GY IP 250 OP 250 PS 637: Performed by: PHYSICIAN ASSISTANT

## 2021-11-08 RX ADMIN — SALMETEROL XINAFOATE 1 PUFF: 50 POWDER, METERED ORAL; RESPIRATORY (INHALATION) at 21:53

## 2021-11-08 RX ADMIN — MEMANTINE 5 MG: 5 TABLET ORAL at 21:53

## 2021-11-08 RX ADMIN — ASPIRIN 81 MG CHEWABLE TABLET 81 MG: 81 TABLET CHEWABLE at 08:30

## 2021-11-08 RX ADMIN — Medication 50 MCG: at 08:30

## 2021-11-08 RX ADMIN — CLOZAPINE 350 MG: 100 TABLET ORAL at 21:53

## 2021-11-08 RX ADMIN — LEVOTHYROXINE SODIUM 88 MCG: 88 TABLET ORAL at 08:30

## 2021-11-08 RX ADMIN — MEMANTINE 5 MG: 5 TABLET ORAL at 08:30

## 2021-11-08 RX ADMIN — METOPROLOL TARTRATE 25 MG: 25 TABLET, FILM COATED ORAL at 22:25

## 2021-11-08 RX ADMIN — ATORVASTATIN CALCIUM 80 MG: 80 TABLET, FILM COATED ORAL at 21:53

## 2021-11-08 RX ADMIN — SALMETEROL XINAFOATE 1 PUFF: 50 POWDER, METERED ORAL; RESPIRATORY (INHALATION) at 08:32

## 2021-11-08 ASSESSMENT — ACTIVITIES OF DAILY LIVING (ADL)
DRESS: INDEPENDENT;PROMPTS;WITH ASSISTANCE
ORAL_HYGIENE: INDEPENDENT;PROMPTS;WITH ASSISTANCE
DRESS: INDEPENDENT;PROMPTS
LAUNDRY: WITH SUPERVISION
HYGIENE/GROOMING: INDEPENDENT;PROMPTS;WITH ASSISTANCE
HYGIENE/GROOMING: INDEPENDENT;PROMPTS
LAUNDRY: UNABLE TO COMPLETE
ORAL_HYGIENE: INDEPENDENT;PROMPTS

## 2021-11-08 NOTE — PROGRESS NOTES
John refused his vital signs this afternoon. RN notified.       11/08/21 9782   Vital Signs   Temp   (Refused)   Pulse   (Refused)   BP   (Refused)   Oxygen Therapy   SpO2   (Refused)

## 2021-11-08 NOTE — PLAN OF CARE
"    Assessment/Intervention/Current Symtoms and Care Coordination  -Chart review  -Pre round meeting with team  -Rounded with team, addressed patient needs/concerns  -Post round meeting with team  Current Symptoms include the following:     This is day 131.      11/5/21  I called the [facility] ([facility]) @[phone]. the intake line was closed. The hours are M-Th 9:30 to 11:30 and 1:30 to 3:30. On Fridays only 9:30 to 11:30.  I will call on Monday.  I called the [facility] @ [phone]. I left a voice mail.   11/8/21  I called the [facility] ([facility]) @ 10:55AM. After explaining the situation to the  she said \"what is it that you want us to do.?' I asked if they could help the pt get a guardian so he could be placed out of the hospital. She said that they help people become their own guardian, they do not help get guardians for people.    Ethics care conference will be held on Thursday. Team agreed I could invite the targeted . I have left him a voice mail.    I left a voice mail for the  payee to keep her updated. She returned the call and appreciated the update.      Discharge Plan or Goal  Considerations include: ethics consult with medicine on Thursday      Barriers to Discharge  Patient does not have a guardian    Referral Status  paused    Legal Status  Patient is under MI commitment in [facility]            "

## 2021-11-08 NOTE — PROGRESS NOTES
"Pt is observed to be in bed all night during rounds.  He drank orange juice and water with encouragement but did not use the bathroom when prompted.  He does report that he got up to use the bathroom x1 but did not urinate into the collection unit.   BP (!) 89/62 (BP Location: Right arm, Patient Position: Supine)   Pulse 81   Temp 98.3  F (36.8  C) (Oral)   Resp 16   Ht 1.829 m (6')   Wt 82.2 kg (181 lb 3.5 oz)   SpO2 91%   BMI 24.58 kg/m    He asked \"Where am I?\"  When staff told him that pt was in the hospital he asked \"When can I go home?\"  Then made statements that he has family who are coming to pick him up today.  Affect is irritable.  Declined IS use.  Pt remains resting in bed at this time.  PO intake on nights 300 mL.  UOP was not measured as he reports voiding into toilet.  Pt is not currently wearing incontinence briefs but remains dry at this time.  VAD in right forearm remains intact.      "

## 2021-11-08 NOTE — PROGRESS NOTES
Patient ate 100% of his chocolate milk and yogurt container, but needed to be fed and straw brought up to his mouth by writer. Med compliant.

## 2021-11-08 NOTE — PLAN OF CARE
Patient has spent the entire shift in his room resting/sleeping. Patient continues to appear to be responding to internal stimuli. Also reaches out for things that are not there. Patient needs encouragement to eat and drink. Staff needs to put food and drink up to his mouth and then he is receptive to it. ADl's are poor, attempted to get patient out of his bed to shower, declined. Will attempt again this evening. Med compliant. Drank 2 boosts, 1 8 ounce of chocolate milk, 1 8 ounce of gatorade, 4 bites of his mashed potatoes and gravy from his lunch tray. Agreeable to go down for his EEG.       Patient evaluation continues. Assessed mood,anxiety,thoughts and behavior.     Patient gradually progressing towards goals.    Patient is encouraged to participate in groups and assisted to develop healthy coping skills.     VS reviewed: BP 95/65   Pulse 83   Temp 98.9  F (37.2  C) (Temporal)   Resp 16   Ht 1.829 m (6')   Wt 82.2 kg (181 lb 3.5 oz)   SpO2 92%   BMI 24.58 kg/m      Length of stay: 131    Refer to daily team meeting notes for individualized plan of care. Nursing will continue to assess.

## 2021-11-08 NOTE — CONSULTS
[facility]  Neurology Consultation - Progress Note    Patient Name:  John Juárez  Date of Service:  November 8, 2021    Subjective:    Neurology asked by psychiatry to reassess.  No change in his status and main concern is anything from neurologic standpoint that would preclude him from getting feeding tube from an ethical standpoint.  Patient is not particularly talkative about his history but does state he knows he is in hospital and wants to leave. Denies pain complaints.   He did have episode where he stared and was not responding to my questions lasting about 5 seconds but when pinched immediately looked at me, cursed at me and told me to stop.      Objective:    Vitals: BP 95/65   Pulse 83   Temp 98.9  F (37.2  C) (Temporal)   Resp 16   Ht 1.829 m (6')   Wt 82.2 kg (181 lb 3.5 oz)   SpO2 92%   BMI 24.58 kg/m    General: Lying in bed, NAD  Cardiac: no lower extremity edema  Neurologic:  Awake, alert, oriented to person and hospital but not name of hospital, month or year.  Able to name but had trouble repeating sentence.  3/3 on immediate but 0/3 on delayed recall.  Could not follow 3 step crossed commands but did simple commands.   Could tell me quarters in a dollar but not $1.75.  Able to name about 6 animals in a minute.  Pupils reactive, and EOMI.  Tardive movements of tongue and oral region, no pronator drift but mild L satelliting on rapid arm roll.  No leg drift.  FNF intact with minimal tremor.  Had trouble with sensory exam but  Responds to noxious stimuli in all 4 extremities but unfortunately after that became upset at me and didn't participate much in exam.      Pertinent Investigations:    I have personally reviewed most recent and pertinent labs, tests, and radiological images.     Dementia Eval:  My impression is patient has a multifactorial dementia related to polysubstance abuse including alcohol, traumatic brain injury, and prior stroke. I doubt  that he will be able to sit for the 4 hours to undergo formal cognitive testing at this time, he is not concerned about his memory. The most active issue are his burst of agitation.  MMSE 20/30 at that time.      MRI  3/2020:  Brain MRI 3/19/20: There is diffuse atrophy. There is a chronic right motor strip and left basal ganglia infarction.  1. No evidence for acute infarct.  2. Chronic lacunar infarcts within the white matter.  3. Minimal chronic microvascular ischemic changes and mild volume loss.  4. No abnormal intra-axial enhancement        Assessment  #vascular Dementia  #Failure to thrive  #History of ischemic stroke and traumatic subarachnoid  #Schizophrenia    Neurology asked to reassess his cognitive status and if severe neurodegenerative dementia present.  My exam today was better than previous neurology evaluation this admission and based on his previous dementia evaluation in the outpatient setting it was not clearly felt this represented a neurodegenerative dementia.   Their impression at that time was that it was likely related to his strokes, subarachnoid, TBI, and alcohol use.  He scored in mild/moderate dementia range at that time.  This outpatient exam is a more thorough and complete exam than I am able to do today unfortunately.  Would have OT see to perform bedside cognitive eval as I do think he would likely tolerate.  Would also get updated head imaging and a EEG is reasonable although abnormal findings will have to be interpreted with caution in setting of clozaril use.  Since was evaluated by dementia specialist and at that time was not necessarily felt to have neurodegenerative disease I cannot say that feeding tube should be held from a ethical standpoint.  Agree with ethics and palliative care consults.          Recommendations:   - Routine EEG (ordered for you)  - Head CT  - OT cognitive screeningeval  - Ethics and palliative care consults  - [facility] medicine assistance for medical  w/u of poor PO intake.      Thank you for involving Neurology in the care of John Juárez.    Lorraine Alvarez, DO    My total floor time today for this patient was at least 50 minutes, greater than half of which was for counseling and coordination of care

## 2021-11-08 NOTE — PROGRESS NOTES
"Internal Medicine Progress Note      Assessment and plan:  John Juárez is a 55 year old male admitted on 6/30/2021. He has a history of schizophrenia, TBI, alcohol use disorder, COPD, HLD, cardiomyopathy (now resolved), endocarditis s/p bioprosthetic valve replacement (2015), CVA, and hypothyroidism. Please see initial IM consult from 7/1/21 for details regarding chronic medical conditions. Medicine closely following over the past several weeks due to failure to thrive.        Severe protein calorie malnutrition, failure to thrive: See medicine notes 11/6 and prior for details and workup. Ethics is involved given that patient would physiologically benefit from a feeding tube (possible NJ TF, though worry about patient removing tube. TPN via PICC very risky due to infections. Could consider PEG, but patient is not decisional and no guardian in place to assist in making decisions). Artificial feeding would not be ethically permissible if patient has underlying dementia. Unfortunately, patient is not appropriate for neuropsychiatric testing at this time per psychiatry. 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, alcoholism, stroke, and subarachnoid hemorrhage\"  - Ethics, psychiatry and medicine all closely following to coordinate care and determine treatment plan  - Consider palliative care consult pending treatment plan   - Nutrition following, calorie counts   - Per prior notes, \"If no concern for severe dementia and patient deemed likely to survive hospitalization will consider placing NJ next week if continues to have poor PO intake\"   - May consider hernandez CT for possible underlying malignancy if work up negative   - Consider starting Remeron for possible depression and appetite stimulant, consider Marinol for appetite stimulant, or adderall      Transient hypoxia: O2 sats 89% while in bed this 11/6 and to 88% in bed 11/7. No hypoxia documented >24 " "hours. CXR 11/6 no acute findings. COVID negative 11/4. D-dimer 1.8 but CT PE 11/7 no PE, mild intralobular septal thickening and lung apices with diffuse GGO representing atelectasis vs edema, advanced emphysematous changes. Lungs CTAB   - Continue salmeterol and albuterol   - IS, acapella  - Continue close monitoring, contact medicine if O2 sats <90% while in bed    Positive blood x1: 1/2 bottles positive for GPC with verigene GP panel positive for staphylococcus species likely contaminant from skin deana. Repeat BCx 11/7 NGTD  - Follow cultures  - Hold off on abx for now  - If patient develops fever to 100.4F or above please notify medicine and will likely need to be transferred to medicine for IV abx     Major neurocognitive disorder, h/o TBI and alcohol use disorder, schizophrenia: Under commitment. Previously resided in a group home. Group home unwilling to take him back. Numerous CODE 21s called. Currently under civil commitment and court hold through [facility] until 7/31/21  - Management per psychiatry         Medicine will continue to closely follow    Objective:  BP 95/65   Pulse 83   Temp 98.9  F (37.2  C) (Temporal)   Resp 16   Ht 1.829 m (6')   Wt 82.2 kg (181 lb 3.5 oz)   SpO2 92%   BMI 24.58 kg/m      Vitals signs reviewed and noted    GENERAL: Alert and oriented x3  HEENT: Anicteric sclera. MMM  CV: RRR, S1, S2. No murmur noted  RESPIRATORY: Effort normal on room air. Lungs CTAB with no wheezing or rales  GI: Abdomen soft, non-tender with active bowel sounds. No rebound or guarding  SKIN: No jaundice, no rash    Pertinent labs and procedures were reviewed     Subjective:   Patient reports feeling \"fine.\" He is tired and would like to rest. He denies chest pain or dyspnea. He is not interested answering further questions at this time    Twyla Kumar PA-C  Internal Medicine  [phone]      "

## 2021-11-08 NOTE — PROGRESS NOTES
"[facility], [place]   Psychiatric Progress Note  Hospital Day: 131        Interim History:   The patient's care was discussed with the treatment team during the daily team meeting and/or staff's chart notes were reviewed.  Staff report patient has tended to isolate in room and be in bed, did come out at one point in evening and started yelling, walked down to desk, taking medications as prescribed, IM following closely, had positive blood culture return over weekend, has received IV fluids and had CT chest.     Upon interview pt was lying in bed, eyes closed, opened spontaneously upon writer entering room. Greeted writer with hello, believes he is in [facility], not oriented to date, provided reorientation. He requests to leave, stating commitment is up. Writer shared concern about his health should he leave hospital, encouraged him to eat and get out of bed, he repeatedly stated he is \"tired\", when asked about AVH he appeared responding to internal stimuli, did become more attentive when writer tapped lightly on his leg, stating \"what!\", he continues to endorse AH, denies VH. Denies SI or HI. Denies side effects to medications other than being tired. He then rolled over and stated he wanted to sleep.     Writer spoke with IM and Neurology, Neurology will reassess and consider EEG to completely rule out medical etiology for patients presentation and recommendations regarding cognition and dementia, will plan to continue to coordinate with possible care conference within disciplines on Thursday.          Medications:       aspirin  81 mg Oral Daily     atorvastatin  80 mg Oral At Bedtime     cholecalciferol  1,250 mcg Oral Q7 Days     cloZAPine  350 mg Oral At Bedtime     levothyroxine  88 mcg Oral Daily     memantine  5 mg Oral BID     metoprolol tartrate  25 mg Oral BID     salmeterol  1 puff Inhalation BID     vitamin D3  50 mcg Oral Daily          Allergies: "     Allergies   Allergen Reactions     Ibuprofen      Latex           Labs:     Recent Results (from the past 48 hour(s))   Basic metabolic panel    Collection Time: 11/06/21  9:30 AM   Result Value Ref Range    Sodium 139 133 - 144 mmol/L    Potassium 3.6 3.4 - 5.3 mmol/L    Chloride 108 94 - 109 mmol/L    Carbon Dioxide (CO2) 27 20 - 32 mmol/L    Anion Gap 4 3 - 14 mmol/L    Urea Nitrogen 16 7 - 30 mg/dL    Creatinine 0.78 0.66 - 1.25 mg/dL    Calcium 8.9 8.5 - 10.1 mg/dL    Glucose 157 (H) 70 - 99 mg/dL    GFR Estimate >90 >60 mL/min/1.73m2   Magnesium    Collection Time: 11/06/21  9:30 AM   Result Value Ref Range    Magnesium 1.9 1.6 - 2.3 mg/dL   CBC with platelets    Collection Time: 11/06/21  9:30 AM   Result Value Ref Range    WBC Count 8.2 4.0 - 11.0 10e3/uL    RBC Count 4.51 4.40 - 5.90 10e6/uL    Hemoglobin 13.9 13.3 - 17.7 g/dL    Hematocrit 41.1 40.0 - 53.0 %    MCV 91 78 - 100 fL    MCH 30.8 26.5 - 33.0 pg    MCHC 33.8 31.5 - 36.5 g/dL    RDW 12.7 10.0 - 15.0 %    Platelet Count 258 150 - 450 10e3/uL   Blood Culture Peripheral Blood    Collection Time: 11/07/21  8:21 AM    Specimen: Peripheral Blood   Result Value Ref Range    Culture No growth after 12 hours    Lactic acid whole blood    Collection Time: 11/07/21  8:27 AM   Result Value Ref Range    Lactic Acid 1.2 0.7 - 2.0 mmol/L   CBC with platelets    Collection Time: 11/07/21  8:27 AM   Result Value Ref Range    WBC Count 8.8 4.0 - 11.0 10e3/uL    RBC Count 4.61 4.40 - 5.90 10e6/uL    Hemoglobin 14.2 13.3 - 17.7 g/dL    Hematocrit 41.3 40.0 - 53.0 %    MCV 90 78 - 100 fL    MCH 30.8 26.5 - 33.0 pg    MCHC 34.4 31.5 - 36.5 g/dL    RDW 12.8 10.0 - 15.0 %    Platelet Count 268 150 - 450 10e3/uL   Comprehensive metabolic panel    Collection Time: 11/07/21  8:27 AM   Result Value Ref Range    Sodium 138 133 - 144 mmol/L    Potassium 3.8 3.4 - 5.3 mmol/L    Chloride 110 (H) 94 - 109 mmol/L    Carbon Dioxide (CO2) 25 20 - 32 mmol/L    Anion Gap 3 3 - 14  "mmol/L    Urea Nitrogen 15 7 - 30 mg/dL    Creatinine 0.88 0.66 - 1.25 mg/dL    Calcium 9.8 8.5 - 10.1 mg/dL    Glucose 121 (H) 70 - 99 mg/dL    Alkaline Phosphatase 66 40 - 150 U/L    AST 74 (H) 0 - 45 U/L    ALT 32 0 - 70 U/L    Protein Total 7.3 6.8 - 8.8 g/dL    Albumin 2.5 (L) 3.4 - 5.0 g/dL    Bilirubin Total 0.5 0.2 - 1.3 mg/dL    GFR Estimate >90 >60 mL/min/1.73m2   Blood Culture Peripheral Blood    Collection Time: 11/07/21  8:28 AM    Specimen: Peripheral Blood   Result Value Ref Range    Culture No growth after 12 hours    D dimer quantitative    Collection Time: 11/07/21 11:48 AM   Result Value Ref Range    D-Dimer Quantitative 1.80 (H) 0.00 - 0.50 ug/mL FEU          Psychiatric Examination:     BP (!) 129/96 (BP Location: Right arm, Patient Position: Semi-Nobles's)   Pulse 99   Temp 98.2  F (36.8  C) (Oral)   Resp 18   Ht 1.829 m (6')   Wt 82.2 kg (181 lb 3.5 oz)   SpO2 93%   BMI 24.58 kg/m    Weight is 181 lbs 3.49 oz  Body mass index is 24.58 kg/m .    Orthostatic Vitals  Report      Most Recent      Lying Orthostatic BP 95/65 11/08 1214    Lying Orthostatic Pulse (bpm) 83 11/08 1214          Appearance: awake, alert, disheveled  Attitude:  Somewhat cooperative  Eye Contact:  fair   Mood:  \"tired\" continues to have periods of irritability   Affect:  mood congruent and intensity is flat  Speech:  soft volume, raspy, dysarthric, volume increases with frustrations  Language: fluent and intact in English  Psychomotor, Gait, Musculoskeletal:  no evidence of dystonia, or tics, continues to have movements of buccolingual dyskinesia present intermittently  Thought Process: disorganized  Associations:  no loose associations  Thought Content:  no evidence of SI or HI; continues to be delusional and observed responding to internal stimuli; endorses AH, denies VH   Insight:  limited  Judgement:  limited  Oriented to:  person  Attention Span and Concentration:  limited  Recent and Remote Memory:  " limited  Fund of Knowledge:  delayed    Clinical Global Impressions  First:  Considering your total clinical experience with this particular patient population, how severe are the patient's symptoms at this time?: 7 (07/01/21 0630)  Compared to the patient's condition at the START of treatment, this patient's condition is: 4 (07/01/21 0630)  Most recent:  Considering your total clinical experience with this particular patient population, how severe are the patient's symptoms at this time?: 6 (11/01/21 1609)  Compared to the patient's condition at the START of treatment, this patient's condition is: 5 (11/01/21 1609)         Precautions:     Behavioral Orders   Procedures     Assault precautions     Cheeking Precautions (behavioral units)     Patient Observed swallowing PO medications; Patient asked to drink water after swallowing medication; Patient in Staff line of sight for 15 minutes after medication given; Mouth checks after PO administration (patient asked to open mouth and stick out their tongue).     Code 1     Code 2     With Security for any imaging/testing needed.     Elopement precautions     Fall precautions     Routine Programming     As clinically indicated     Single Room     Status 15     Every 15 minutes.          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   Stimulant use disorder, in remission.   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 after a nearly year-long stay. Is currently under commitment with Yanez recently amended to include Clozaril. While at [facility] was noted to have ongoing agitation and frequently attempting to elope from unit requiring 1:1 staffing for safety. He was started on 1:1 staff upon transfer, this was discontinued as patient has been more cooperative without elopement attempts. IM consult completed on admission and continued to follow due to elevated LFTs. Haldol and VPA discontinued due to LFTs. Cross titration completed from risperidone to clozaril after Yanez amended. EPSE/TD movements noted, have appeared stable over past few weeks. Patient has continued to have disorganization and active psychosis symptoms which appear to be at patients baseline. Team continues to work on disposition which barriers include patient has no guardian or next of kin willing to act as surrogate decision maker, see CTC notes for complete details. Patient starting to have increasing symptoms noted week of 9/20, clozapine level ordered and noted to be lower than previous, have added cheeking precautions and increased clozapine dose on 9/24 and moved to split dosing given increasing afternoon agitation. Pt having decreased appetite, increased fatigue over weekend of 10/9-10/10, IM consult placed 10/11, KUB Xray showed large stool burden, bowel regimen modified. Pt was incontinent of stool evening of 10/13. Consolidated clozapine dose to bedtime given concern for daytime sedation with split dosing. Ongoing malaise noted starting 10/11, internal medicine has been contacted periodically. Noted 18# weight loss in 2 months, IM contacted for possible FTT on 11/4. Ethics consult placed 11/5. Neurology re-consulted 11/8.     Psychiatric  treatment/inteventions:  Medications:   -conitnue clozapine 350mg at bedtime, reduced over the past few weeks due to ongoing sedation/fatigue and now prolonged QTc; continue to monitor movements consistent with TD  -continue cheeking precautions, started 9/27 given noted decompensation in recent weeks and lower clozapine level   -continue memantine at 5mg daily for neurocognitive disorder, dose was up to 10mg BID and have been reducing dose, may taper off as patient does not appear to have benefit and reporting oversedation and to reduce anticholinergic load  -continue risperdal 1mg Q6H PRN agitation   -continue lorazepam 0.5-1.0mg Q4H PRN anxiety or severe agitation    -discontinued benztropine 0.25mg BID on 10/21, tapered off to reduce anticholinergic load  -discontinued rivastigmine patch on 10/21,pt did not seemed to have benefit and to reduce anticholinergic load    -Ethics consult placed 11/5 due to patient lacking decision making capacity without guardian or surrogate decision maker and possible need for medical interventions including NJ or PEG tube in future, appreciate assistance     Laboratory/Imaging: weekly WBC and ANC for clozapine monitoring continues ANC 10/29 was 3.9,  repeat EKG showed improvement in QTc at 451ms     Patient will be treated in therapeutic milieu with appropriate individual and group therapies as described.     Medical treatment/interventions:  Medical concerns:   IM re-consulted due to return of fever and ongoing malaise 10/22, additional labs ordered along with Echo. Also placed Neurology consult to rule out any possibility of seizure activity. Neurology did not feel any further work up warranted. Pt continued to have poor intake, now with tachycardia, dark urine and hypotension and possible thrush, also concern for bed sores and loose stools. Pt was given 1L IVF on 10/29, BP improved, IM signed off. Having low O2 sats, ongoing malaise, IM contacted by RN. Will also work to get  "patient medical bed. RN checking with IM 11/2 to see if IV should be pulled or if patient requires more fluids. Discussed concern for failure to thrive with IM on 11/4 who re-evaluated patient. Also re-consulted Neurology 11/8 in discussion with IM, appreciate assistance.     Per IM progress note 11/8:  John Juárez is a 55 year old male admitted on 6/30/2021. He has a history of schizophrenia, TBI, alcohol use disorder, COPD, HLD, cardiomyopathy (now resolved), endocarditis s/p bioprosthetic valve replacement (2015), CVA, and hypothyroidism. Please see initial IM consult from 7/1/21 for details regarding chronic medical conditions. Medicine closely following over the past several weeks due to failure to thrive.     Severe protein calorie malnutrition, failure to thrive: See medicine notes 11/6 and prior for details and workup. Ethics is involved given that patient would physiologically benefit from a feeding tube (possible NJ TF, though worry about patient removing tube. TPN via PICC very risky due to infections. Could consider PEG, but patient is not decisional and no guardian in place to assist in making decisions). Artificial feeding would not be ethically permissible if patient has underlying dementia. Unfortunately, patient is not appropriate for neuropsychiatric testing at this time per psychiatry. 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, alcoholism, stroke, and subarachnoid hemorrhage\"  - Ethics, psychiatry and medicine all closely following to coordinate care and determine treatment plan  - Consider palliative care consult pending treatment plan   - Nutrition following, calorie counts   - Per prior notes, \"If no concern for severe dementia and patient deemed likely to survive hospitalization will consider placing NJ next week if continues to have poor PO intake\"   - May consider hernandez CT for possible underlying malignancy if work up " negative   - Consider starting Remeron for possible depression and appetite stimulant, consider Marinol for appetite stimulant, or adderall      Transient hypoxia: O2 sats 89% while in bed this 11/6 and to 88% in bed 11/7. No hypoxia documented >24 hours. CXR 11/6 no acute findings. COVID negative 11/4. D-dimer 1.8 but CT PE 11/7 no PE, mild intralobular septal thickening and lung apices with diffuse GGO representing atelectasis vs edema, advanced emphysematous changes. Lungs CTAB   - Continue salmeterol and albuterol   - IS, acapella  - Continue close monitoring, contact medicine if O2 sats <90% while in bed     Positive blood x1: 1/2 bottles positive for GPC with verigene GP panel positive for staphylococcus species likely contaminant from skin deana. Repeat BCx 11/7 NGTD  - Follow cultures  - Hold off on abx for now  - If patient develops fever to 100.4F or above please notify medicine and will likely need to be transferred to medicine for IV abx     Major neurocognitive disorder, h/o TBI and alcohol use disorder, schizophrenia: Under commitment. Previously resided in a group home. Group home unwilling to take him back. Numerous CODE 21s called. Currently under civil commitment and court hold through [facility] until 7/31/21  - Management per psychiatry         Medicine will continue to closely follow    This note was created by undersigned using a Dragon dictation system. All typing errors or contextual distortion are unintentional and software inherent.     Disposition Plan   Reason for ongoing admission: is unable to care for self due to severe psychosis or mariusz and neurocognitive disorder   Discharge location: [facility], likely locked NH facility , unable to progress on discharge planning due to guardianship not being in place, see CTC notes  Discharge Medications: not ordered  Follow-up Appointments: not scheduled  Legal Status: Full MI commitment and Yanez     Entered by: Jeanette Dobbins on 11/8/2021 at  6:00 AM

## 2021-11-09 LAB
BACTERIA BLD CULT: ABNORMAL
BACTERIA BLD CULT: ABNORMAL

## 2021-11-09 PROCEDURE — 250N000013 HC RX MED GY IP 250 OP 250 PS 637: Performed by: PSYCHIATRY & NEUROLOGY

## 2021-11-09 PROCEDURE — 124N000002 HC R&B MH UMMC

## 2021-11-09 PROCEDURE — 250N000013 HC RX MED GY IP 250 OP 250 PS 637: Performed by: PHYSICIAN ASSISTANT

## 2021-11-09 PROCEDURE — 99233 SBSQ HOSP IP/OBS HIGH 50: CPT | Performed by: PSYCHIATRY & NEUROLOGY

## 2021-11-09 RX ADMIN — Medication 50 MCG: at 08:53

## 2021-11-09 RX ADMIN — METOPROLOL TARTRATE 25 MG: 25 TABLET, FILM COATED ORAL at 08:53

## 2021-11-09 RX ADMIN — LEVOTHYROXINE SODIUM 88 MCG: 88 TABLET ORAL at 08:54

## 2021-11-09 RX ADMIN — MEMANTINE 5 MG: 5 TABLET ORAL at 21:42

## 2021-11-09 RX ADMIN — SALMETEROL XINAFOATE 1 PUFF: 50 POWDER, METERED ORAL; RESPIRATORY (INHALATION) at 08:54

## 2021-11-09 RX ADMIN — SALMETEROL XINAFOATE 1 PUFF: 50 POWDER, METERED ORAL; RESPIRATORY (INHALATION) at 21:41

## 2021-11-09 RX ADMIN — CLOZAPINE 350 MG: 100 TABLET ORAL at 21:42

## 2021-11-09 RX ADMIN — MEMANTINE 5 MG: 5 TABLET ORAL at 08:54

## 2021-11-09 RX ADMIN — ASPIRIN 81 MG CHEWABLE TABLET 81 MG: 81 TABLET CHEWABLE at 08:54

## 2021-11-09 RX ADMIN — ATORVASTATIN CALCIUM 80 MG: 80 TABLET, FILM COATED ORAL at 21:43

## 2021-11-09 ASSESSMENT — MIFFLIN-ST. JEOR: SCORE: 1655.89

## 2021-11-09 ASSESSMENT — ACTIVITIES OF DAILY LIVING (ADL)
ORAL_HYGIENE: PROMPTS;WITH ASSISTANCE
ORAL_HYGIENE: PROMPTS;WITH ASSISTANCE
HYGIENE/GROOMING: PROMPTS;WITH ASSISTANCE
DRESS: INDEPENDENT;PROMPTS;WITH ASSISTANCE
LAUNDRY: UNABLE TO COMPLETE
HYGIENE/GROOMING: PROMPTS;WITH ASSISTANCE
LAUNDRY: UNABLE TO COMPLETE
DRESS: PROMPTS;WITH ASSISTANCE

## 2021-11-09 NOTE — PROGRESS NOTES
Pt refused vital signs       11/09/21 1621   Vital Signs   Temp   (refused)   Pulse   (refused)   BP   (refused)

## 2021-11-09 NOTE — PROGRESS NOTES
Patient ate a few bites of his breakfast with writer encouragement. Drank 100% of a strawberry boost. Drank 1/2 of a second boost. Was able to get him out of his bed. Linen changed. New scrubs and pull up changed with staff assist. Patient refuses to shower. Was cleaned up in his room with warm water and soap.

## 2021-11-09 NOTE — PROGRESS NOTES
Patient continues to isolate to his room. Patient with assistance and prompting drank a 8 oz Boost, mandarin oranges, and 1/2 magic cup. When checking in on the patient he also drank approximately 4 ounces of water. Continues to be irritable and labile. Continues to say he is tired. Reporting having hallucinations. Easily distracted.     Patient evaluation continues. Assessed mood,anxiety,thoughts and behavior.     Patient is encouraged to participate in groups and assisted to develop healthy coping skills.     VS reviewed: BP 94/64 (BP Location: Right arm)   Pulse 86   Temp 98.9  F (37.2  C) (Oral)   Resp 16   Ht 1.829 m (6')   Wt 79.3 kg (174 lb 12.8 oz)   SpO2 92%   BMI 23.71 kg/m      Length of stay: 132    Refer to daily team meeting notes for individualized plan of care. Nursing will continue to assess.

## 2021-11-09 NOTE — PLAN OF CARE
Patient has spent the entire shift in his room. Patient refused to eat his dinner tray or drink any fluids. Staff attempted several times to encourage him to eat and drink but he continuously says that he is tired. Med compliant. Continues to appear distracted and responding to internal stimuli.    Patient evaluation continues. Assessed mood,anxiety,thoughts and behavior.     Patient gradually progressing towards goals.    Patient is encouraged to participate in groups and assisted to develop healthy coping skills.     VS reviewed: /75   Pulse 102   Temp 99.3  F (37.4  C) (Oral)   Resp 16   Ht 1.829 m (6')   Wt 82.2 kg (181 lb 3.5 oz)   SpO2 91%   BMI 24.58 kg/m      Length of stay: 131    Refer to daily team meeting notes for individualized plan of care. Nursing will continue to assess.

## 2021-11-09 NOTE — PROGRESS NOTES
"[facility], [address]   Psychiatric Progress Note  Hospital Day: 132        Interim History:   The patient's care was discussed with the treatment team during the daily team meeting and/or staff's chart notes were reviewed.  Staff report patient got up from bed 1x overnight to utilize bathroom, good urine output, did not use collection hat, declined dinner, did have fluids throughout evening/night, continues to report being tired and observed responding to internal stimuli.     Upon interview pt was lying in bed with head elevated, RN at bedside encouraging intake currently providing Ensure with straw, pt had not eaten much of his breakfast. He reports mood is \"fine\", continues to state he is \"tired\", encouraged him to eat and drink and leave his bed in order to help with his energy. He believes he is at [facility] and the year is 2099 and he is 66 years ago. Writer provided orientation. He inquired as to when he leave, his commitment \"is up\" and became increasingly irritable and using profanities, writer shared team is also wanting him to discharge but concerned with his physical condition and again encouraged him to increase intake and activity. He denies SI or HI, report AH which are \"the same\", denies VH. Tolerating medications, unaware of TD movements. Denies having any pain or discomfort. No additional concerns.          Medications:       aspirin  81 mg Oral Daily     atorvastatin  80 mg Oral At Bedtime     cholecalciferol  1,250 mcg Oral Q7 Days     cloZAPine  350 mg Oral At Bedtime     levothyroxine  88 mcg Oral Daily     memantine  5 mg Oral BID     metoprolol tartrate  25 mg Oral BID     salmeterol  1 puff Inhalation BID     vitamin D3  50 mcg Oral Daily          Allergies:     Allergies   Allergen Reactions     Ibuprofen      Latex           Labs:     Recent Results (from the past 48 hour(s))   Blood Culture Peripheral Blood    Collection Time: 11/07/21  8:21 AM    Specimen: " Peripheral Blood   Result Value Ref Range    Culture No growth after 1 day    Lactic acid whole blood    Collection Time: 11/07/21  8:27 AM   Result Value Ref Range    Lactic Acid 1.2 0.7 - 2.0 mmol/L   CBC with platelets    Collection Time: 11/07/21  8:27 AM   Result Value Ref Range    WBC Count 8.8 4.0 - 11.0 10e3/uL    RBC Count 4.61 4.40 - 5.90 10e6/uL    Hemoglobin 14.2 13.3 - 17.7 g/dL    Hematocrit 41.3 40.0 - 53.0 %    MCV 90 78 - 100 fL    MCH 30.8 26.5 - 33.0 pg    MCHC 34.4 31.5 - 36.5 g/dL    RDW 12.8 10.0 - 15.0 %    Platelet Count 268 150 - 450 10e3/uL   Comprehensive metabolic panel    Collection Time: 11/07/21  8:27 AM   Result Value Ref Range    Sodium 138 133 - 144 mmol/L    Potassium 3.8 3.4 - 5.3 mmol/L    Chloride 110 (H) 94 - 109 mmol/L    Carbon Dioxide (CO2) 25 20 - 32 mmol/L    Anion Gap 3 3 - 14 mmol/L    Urea Nitrogen 15 7 - 30 mg/dL    Creatinine 0.88 0.66 - 1.25 mg/dL    Calcium 9.8 8.5 - 10.1 mg/dL    Glucose 121 (H) 70 - 99 mg/dL    Alkaline Phosphatase 66 40 - 150 U/L    AST 74 (H) 0 - 45 U/L    ALT 32 0 - 70 U/L    Protein Total 7.3 6.8 - 8.8 g/dL    Albumin 2.5 (L) 3.4 - 5.0 g/dL    Bilirubin Total 0.5 0.2 - 1.3 mg/dL    GFR Estimate >90 >60 mL/min/1.73m2   Blood Culture Peripheral Blood    Collection Time: 11/07/21  8:28 AM    Specimen: Peripheral Blood   Result Value Ref Range    Culture No growth after 1 day    D dimer quantitative    Collection Time: 11/07/21 11:48 AM   Result Value Ref Range    D-Dimer Quantitative 1.80 (H) 0.00 - 0.50 ug/mL FEU          Psychiatric Examination:     /81 (BP Location: Right arm, Patient Position: Supine)   Pulse 86   Temp 97.4  F (36.3  C) (Oral)   Resp 16   Ht 1.829 m (6')   Wt 82.2 kg (181 lb 3.5 oz)   SpO2 93%   BMI 24.58 kg/m    Weight is 181 lbs 3.49 oz  Body mass index is 24.58 kg/m .    Orthostatic Vitals  Report      Most Recent      Lying Orthostatic BP 94/64 11/09 1225    Lying Orthostatic Pulse (bpm) 86 11/09 1225     "    Appearance: awake, alert, disheveled  Attitude:  Somewhat cooperative  Eye Contact:  fair, improved from yesterday  Mood:  \"fine\" continues to have periods of irritability   Affect:  mood congruent and intensity is flat  Speech:  soft volume, raspy, dysarthric, volume increases with frustrations being voiced  Language: fluent and intact in English  Psychomotor, Gait, Musculoskeletal:  no evidence of dystonia, or tics, continues to have movements of buccolingual dyskinesia present   Thought Process: disorganized  Associations:  no loose associations  Thought Content:  no evidence of SI or HI; continues to be delusional and observed responding to internal stimuli; endorses AH, denies VH   Insight:  limited  Judgement:  limited  Oriented to:  person  Attention Span and Concentration:  limited  Recent and Remote Memory:  limited  Fund of Knowledge:  delayed    Clinical Global Impressions  First:  Considering your total clinical experience with this particular patient population, how severe are the patient's symptoms at this time?: 7 (07/01/21 0630)  Compared to the patient's condition at the START of treatment, this patient's condition is: 4 (07/01/21 0630)  Most recent:  Considering your total clinical experience with this particular patient population, how severe are the patient's symptoms at this time?: 6 (11/01/21 1609)  Compared to the patient's condition at the START of treatment, this patient's condition is: 5 (11/01/21 1609)         Precautions:     Behavioral Orders   Procedures     Assault precautions     Cheeking Precautions (behavioral units)     Patient Observed swallowing PO medications; Patient asked to drink water after swallowing medication; Patient in Staff line of sight for 15 minutes after medication given; Mouth checks after PO administration (patient asked to open mouth and stick out their tongue).     Code 1     Code 2     With Security for any imaging/testing needed.     Elopement precautions "     Fall precautions     Routine Programming     As clinically indicated     Single Room     Status 15     Every 15 minutes.          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   Stimulant use disorder, in remission.   Transaminitis, possibly medication induced   Hx of CVA  Vit D deficiency  Hypertension  COPD  Hx of infective endocarditis with severe mitral and aortic regurgitation s/p biprostehtic valve replacement 2015  Hx of HFrEF now recovered  Tobacco use disorder  Severe malnutrition in context of acute illness   Urinary incontinence  Constipation   Malaise  Prolonged QTc         Assessment & Plan:   Assessment and hospital summary:  The patient is a 54yo male with a history of schizophrenia and TBI and multiple medical co-morbidities as above who was admitted after being transferred from [facility] medical [facility] after a nearly year-long stay. Is currently under commitment with Yanez recently amended to include Clozaril. While at [facility] was noted to have ongoing agitation and frequently attempting to elope from unit requiring 1:1 staffing for safety. He was started on 1:1 staff upon transfer, this was discontinued as patient has been more cooperative without elopement attempts. IM consult completed on admission and continued to follow due to elevated LFTs. Haldol and VPA discontinued due to LFTs. Cross titration completed from risperidone to clozaril after Yanez amended. EPSE/TD movements noted, have appeared stable over past few weeks. Patient has continued to have disorganization and active psychosis symptoms which appear to be at patients baseline. Team continues to work on disposition which barriers include patient has no guardian or next of kin willing to act as surrogate decision maker, see CTC notes for complete  details. Patient starting to have increasing symptoms noted week of 9/20, clozapine level ordered and noted to be lower than previous, have added cheeking precautions and increased clozapine dose on 9/24 and moved to split dosing given increasing afternoon agitation. Pt having decreased appetite, increased fatigue over weekend of 10/9-10/10, IM consult placed 10/11, KUB Xray showed large stool burden, bowel regimen modified. Pt was incontinent of stool evening of 10/13. Consolidated clozapine dose to bedtime given concern for daytime sedation with split dosing. Ongoing malaise noted starting 10/11, internal medicine has been contacted periodically. Noted 18# weight loss in 2 months, IM contacted for possible FTT on 11/4. Ethics consult placed 11/5. Neurology re-consulted 11/8.     Psychiatric treatment/inteventions:  Medications:   -continue clozapine 350mg at bedtime, reduced over the past few weeks due to ongoing sedation/fatigue and now prolonged QTc; continue to monitor movements consistent with TD  -continue cheeking precautions, started 9/27 given noted decompensation in recent weeks and lower clozapine level   -continue memantine at 5mg daily for neurocognitive disorder, dose was up to 10mg BID and have been reducing dose, may taper off as patient does not appear to have benefit and reporting oversedation and to reduce anticholinergic load  -continue risperdal 1mg Q6H PRN agitation   -continue lorazepam 0.5-1.0mg Q4H PRN anxiety or severe agitation    -discontinued benztropine 0.25mg BID on 10/21, tapered off to reduce anticholinergic load  -discontinued rivastigmine patch on 10/21,pt did not seemed to have benefit and to reduce anticholinergic load    -Ethics consult placed 11/5 due to patient lacking decision making capacity without guardian or surrogate decision maker and possible need for medical interventions including NJ or PEG tube in future, appreciate assistance, plan to have care conference on  Thursday 11/11 with Ethics and IM to discuss interventions needed    Laboratory/Imaging: weekly WBC and ANC for clozapine monitoring continues ANC 11/4 was 4.5; other labs continue per IM; EEG ordered, report pending     Patient will be treated in therapeutic milieu with appropriate individual and group therapies as described.     Medical treatment/interventions:  Medical concerns:   IM re-consulted due to return of fever and ongoing malaise 10/22, additional labs ordered along with Echo. Also placed Neurology consult to rule out any possibility of seizure activity. Neurology did not feel any further work up warranted. Pt continued to have poor intake, now with tachycardia, dark urine and hypotension and possible thrush, also concern for bed sores and loose stools. Pt was given 1L IVF on 10/29, BP improved, IM signed off. Having low O2 sats, ongoing malaise, IM contacted by RN. Will also work to get patient medical bed. RN checking with IM 11/2 to see if IV should be pulled or if patient requires more fluids. Discussed concern for failure to thrive with IM on 11/4 who re-evaluated patient. Also re-consulted Neurology 11/8 in discussion with IM, appreciate assistance, see consult note 11/8 for complete details.     Per IM progress note 11/8:  John Juárez is a 55 year old male admitted on 6/30/2021. He has a history of schizophrenia, TBI, alcohol use disorder, COPD, HLD, cardiomyopathy (now resolved), endocarditis s/p bioprosthetic valve replacement (2015), CVA, and hypothyroidism. Please see initial IM consult from 7/1/21 for details regarding chronic medical conditions. Medicine closely following over the past several weeks due to failure to thrive.     Severe protein calorie malnutrition, failure to thrive: See medicine notes 11/6 and prior for details and workup. Ethics is involved given that patient would physiologically benefit from a feeding tube (possible NJ TF, though worry about patient removing tube.  "TPN via PICC very risky due to infections. Could consider PEG, but patient is not decisional and no guardian in place to assist in making decisions). Artificial feeding would not be ethically permissible if patient has underlying dementia. Unfortunately, patient is not appropriate for neuropsychiatric testing at this time per psychiatry. 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, alcoholism, stroke, and subarachnoid hemorrhage\"  - Ethics, psychiatry and medicine all closely following to coordinate care and determine treatment plan  - Consider palliative care consult pending treatment plan   - Nutrition following, calorie counts   - Per prior notes, \"If no concern for severe dementia and patient deemed likely to survive hospitalization will consider placing NJ next week if continues to have poor PO intake\"   - May consider hernandez CT for possible underlying malignancy if work up negative   - Consider starting Remeron for possible depression and appetite stimulant, consider Marinol for appetite stimulant, or adderall      Transient hypoxia: O2 sats 89% while in bed this 11/6 and to 88% in bed 11/7. No hypoxia documented >24 hours. CXR 11/6 no acute findings. COVID negative 11/4. D-dimer 1.8 but CT PE 11/7 no PE, mild intralobular septal thickening and lung apices with diffuse GGO representing atelectasis vs edema, advanced emphysematous changes. Lungs CTAB   - Continue salmeterol and albuterol   - IS, acapella  - Continue close monitoring, contact medicine if O2 sats <90% while in bed     Positive blood x1: 1/2 bottles positive for GPC with verigene GP panel positive for staphylococcus species likely contaminant from skin deana. Repeat BCx 11/7 NGTD  - Follow cultures  - Hold off on abx for now  - If patient develops fever to 100.4F or above please notify medicine and will likely need to be transferred to medicine for IV abx     Major neurocognitive disorder, " h/o TBI and alcohol use disorder, schizophrenia: Under commitment. Previously resided in a group home. Group home unwilling to take him back. Numerous CODE 21s called. Currently under civil commitment and court hold through [facility] until 7/31/21  - Management per psychiatry         Medicine will continue to closely follow    This note was created by undersigned using a Dragon dictation system. All typing errors or contextual distortion are unintentional and software inherent.     Disposition Plan   Reason for ongoing admission: is unable to care for self due to severe psychosis or mariusz and neurocognitive disorder   Discharge location: D, likely locked NH facility , unable to progress on discharge planning due to guardianship not being in place, see CTC notes  Discharge Medications: not ordered  Follow-up Appointments: not scheduled  Legal Status: Full MI commitment and Yanez     Entered by: Jeanette Dobbins on 11/9/2021 at 7:20 AM

## 2021-11-09 NOTE — PROGRESS NOTES
CLINICAL NUTRITION SERVICES - REASSESSMENT NOTE     Nutrition Prescription    RECOMMENDATIONS FOR MDs/PROVIDERS TO ORDER:  Continue to recommend initiation of nutrition support if appropriate based on goals of care as patient continues to meet <50% of estimated needs     Malnutrition Status:    Severe malnutrition in the context of acute illness- previously noted, remains current    Recommendations already ordered by Registered Dietitian (RD):  Medical food supplement therapy- discontinue special K protein bars as they are non-compliant with current diet, increase Ensure Enlive to tid with meals, trial TwoCal HN once daily between meals    Future/Additional Recommendations:  Monitor goals of care, potential to initiate nutrition support     EVALUATION OF THE PROGRESS TOWARD GOALS   Diet: Mechanical/Dental Soft  Supplements: Special K bar once daily (ordered when on regular diet), Ensure Enlive bid with meals, Magic Cup bid with meals  Intake: overall poor, but patient is taking Ensure and sometimes also takes Magic Cup. Spoke with RN who reports intake of meals is quite poor.   Calorie counts:    11/7: 922 kcal/53 g protein  11/6: 700 kcal/40 g protein  11/5: 437 kcal/25 g protein    NEW FINDINGS   Reviewed notes and spoke with RN. Medical and psychiatric team questioning advancing dementia in which case feeding tube is not appropriate. Ethics consulted.    Weight: 89.3 kg (9/28)-->79.3 kg (11/9)- 12.6% weight loss over 6 weeks    Labs: Noted inflammatory markers remain elevated    Electrolytes  Sodium (mmol/L)   Date Value   11/07/2021 138   07/01/2021 140     Potassium (mmol/L)   Date Value   11/07/2021 3.8   07/01/2021 4.3     Magnesium (mg/dL)   Date Value   11/06/2021 1.9     Phosphorus (mg/dL)   Date Value   11/04/2021 4.4   09/28/2020 2.9    Renal  Urea Nitrogen (mg/dL)   Date Value   11/07/2021 15   07/01/2021 14     Creatinine (mg/dL)   Date Value   11/07/2021 0.88   07/01/2021 0.90     Inflammatory  Markers  CRP Inflammation (mg/L)   Date Value   11/04/2021 31.0 (H)   10/29/2021 27.0 (H)   10/26/2021 25.0 (H)     WBC (10e9/L)   Date Value   07/09/2021 6.5     WBC Count (10e3/uL)   Date Value   11/07/2021 8.8     Albumin (g/dL)   Date Value   11/07/2021 2.5 (L)   07/07/2021 3.2 (L)      Blood Glucose  Glucose (mg/dL)   Date Value   11/07/2021 121 (H)   07/01/2021 84     Hemoglobin A1C (%)   Date Value   08/26/2010 5.9   08/25/2010 5.8    Hepatic  ALT (U/L)   Date Value   11/07/2021 32   07/07/2021 129 (H)     AST (U/L)   Date Value   11/07/2021 74 (H)   07/07/2021 182 (H)     Alkaline Phosphatase (U/L)   Date Value   11/07/2021 66   07/07/2021 82     Bilirubin Total (mg/dL)   Date Value   11/07/2021 0.5   07/07/2021 0.5    Additional  Triglycerides (mg/dL)   Date Value   07/02/2021 116     Ketones Urine (mg/dL)   Date Value   11/05/2021 Negative   09/09/2020 Negative          MALNUTRITION  % Intake: </= 50% for >/= 5 days (severe)  % Weight Loss: > 5% in 1 month (severe)  Subcutaneous Fat Loss: Unable to assess  Muscle Loss: Unable to assess  Fluid Accumulation/Edema: None noted  Malnutrition Diagnosis: Severe malnutrition in the context of acute illness     Previous Goals   Patient to consume % of nutritionally adequate meal trays TID, or the equivalent with supplements/snacks.  Evaluation: Not met    Previous Nutrition Diagnosis  Inadequate oral intake of unclear etiology (failure to thrive, menu fatigue) as evidenced by poor intake and severe weight loss    Evaluation: No change    CURRENT NUTRITION DIAGNOSIS  Inadequate oral intake of unclear etiology (failure to thrive, menu fatigue) as evidenced by poor intake and severe weight loss      INTERVENTIONS  Implementation  Medical food supplement therapy- discontinue special K protein bars as they are non-compliant with current diet, increase Ensure Enlive to tid with meals, trial TwoCal HN once daily between meals    Goals  Patient to consume % of  nutritionally adequate meal trays TID, or the equivalent with supplements/snacks.    Monitoring/Evaluation  Progress toward goals will be monitored and evaluated per protocol.    Najma Suarez RD, LD  ICU/5A/OB/Mental Health Pager (M-F): [phone]  On Call Pager (weekends only): [phone]

## 2021-11-09 NOTE — PLAN OF CARE
"    Assessment/Intervention/Current Symtoms and Care Coordination  -Chart review  -Pre round meeting with team  -Rounded with team, addressed patient needs/concerns  -Post round meeting with team  Current Symptoms include the following:   Pt is not eating or drinking.  Pt is not leaving his bed.      Pt has been getting a medical work up to see what might be the cause of his malaise.      The TCM is not able to come to the Ethics Care Conference as Thursday is 's Day and the agency is closed.      11/5/21  I called the [place] [facility] ([facility]) @[phone]. the intake line was closed. The hours are M-Th 9:30 to 11:30 and 1:30 to 3:30. On Fridays only 9:30 to 11:30.  I will call on Monday.  I called the [facility] @ [phone]. I left a voice mail.   11/8/21  I called the [place] [facility] ([facility]) @ 10:55AM. After explaining the situation to the  she said \"what is it that you want us to do.?' I asked if they could help the pt get a guardian so he could be placed out of the hospital. She said that they help people become their own guardian, they do not help get guardians for people.  11/9/21, I received a phone call from Whitney [phone], Director  at the [facility] . She apologized for my call getting lost in their system. We discussed system barriers. She stated \"there is nothing magical about our resources, if no one will take him and you have called 25 places, I am not hopeful that we can help.\" She said there have been developing problems with nursing homes as they aer lulú bought by out of [employer]. She says there is no state law that there has to be a guardian, in fact the opposite is true that they can not demand a guardian but they can ask for a decision maker and a rep payee. She asked about his clinical condition when updated on this said that if the pt rebounds " "and stabilizes I can call her again. She said that the guardians we have called may reconsider if there concern was the clinical condition as \" this is now a very different person.\"      Discharge Plan or Goal  Nursing home placement    Barriers to Discharge  No guardian, pt is experiencing physical health decline      Referral Status  paused    Legal Status  Patient is under MI commitment in [facility]            "

## 2021-11-09 NOTE — PROGRESS NOTES
Pt was observed attempting to get out of his bed at 5:15 am.  Writer went to assist as Pt commented that he needed to go to the bathroom.  Staff encouraged him to get up slowly and walk when he feels ready.  Pt appeared unsteady at first but ambulated slow and steady once he started walking.  Good urine output (450mL).  Needs reminders to urinate into the collection unit placed in the toilet.  Pt spilled water on his pants while washing his hands this morning and declined to change.  Pt walked back to his bed without difficulty.  He declined to put on an incontinence brief in case he doesn't make it to the toilet.  Becomes irritable with prompts and assessments.  Declines IS use.  Drank 1 large cup of water and had 1 juice cup earlier in the night.   VS: /81 (BP Location: Right arm, Patient Position: Supine)   Pulse 86   Temp 97.4  F (36.3  C) (Oral)   Resp 16   Ht 1.829 m (6')   Wt 82.2 kg (181 lb 3.5 oz)   SpO2 93%   BMI 24.58 kg/m    I&O:  450mL UOP, 360 PO intake

## 2021-11-10 LAB
ATRIAL RATE - MUSE: 84 BPM
DIASTOLIC BLOOD PRESSURE - MUSE: NORMAL MMHG
INTERPRETATION ECG - MUSE: NORMAL
P AXIS - MUSE: 31 DEGREES
PR INTERVAL - MUSE: 168 MS
QRS DURATION - MUSE: 124 MS
QT - MUSE: 382 MS
QTC - MUSE: 451 MS
R AXIS - MUSE: 39 DEGREES
SYSTOLIC BLOOD PRESSURE - MUSE: NORMAL MMHG
T AXIS - MUSE: 31 DEGREES
VENTRICULAR RATE- MUSE: 84 BPM

## 2021-11-10 PROCEDURE — 250N000013 HC RX MED GY IP 250 OP 250 PS 637: Performed by: PSYCHIATRY & NEUROLOGY

## 2021-11-10 PROCEDURE — 250N000013 HC RX MED GY IP 250 OP 250 PS 637: Performed by: PHYSICIAN ASSISTANT

## 2021-11-10 PROCEDURE — 124N000002 HC R&B MH UMMC

## 2021-11-10 RX ADMIN — ASPIRIN 81 MG CHEWABLE TABLET 81 MG: 81 TABLET CHEWABLE at 08:31

## 2021-11-10 RX ADMIN — MEMANTINE 5 MG: 5 TABLET ORAL at 20:31

## 2021-11-10 RX ADMIN — Medication 50 MCG: at 08:31

## 2021-11-10 RX ADMIN — METOPROLOL TARTRATE 25 MG: 25 TABLET, FILM COATED ORAL at 08:31

## 2021-11-10 RX ADMIN — CLOZAPINE 350 MG: 100 TABLET ORAL at 20:31

## 2021-11-10 RX ADMIN — MEMANTINE 5 MG: 5 TABLET ORAL at 08:31

## 2021-11-10 RX ADMIN — ATORVASTATIN CALCIUM 80 MG: 80 TABLET, FILM COATED ORAL at 20:31

## 2021-11-10 RX ADMIN — SALMETEROL XINAFOATE 1 PUFF: 50 POWDER, METERED ORAL; RESPIRATORY (INHALATION) at 20:32

## 2021-11-10 RX ADMIN — SALMETEROL XINAFOATE 1 PUFF: 50 POWDER, METERED ORAL; RESPIRATORY (INHALATION) at 08:31

## 2021-11-10 RX ADMIN — LEVOTHYROXINE SODIUM 88 MCG: 88 TABLET ORAL at 08:31

## 2021-11-10 ASSESSMENT — ACTIVITIES OF DAILY LIVING (ADL)
LAUNDRY: UNABLE TO COMPLETE
DRESS: INDEPENDENT;WITH ASSISTANCE
ORAL_HYGIENE: INDEPENDENT;PROMPTS;WITH ASSISTANCE
HYGIENE/GROOMING: INDEPENDENT;PROMPTS
HYGIENE/GROOMING: INDEPENDENT;PROMPTS;WITH ASSISTANCE
DRESS: INDEPENDENT;PROMPTS;WITH ASSISTANCE
ORAL_HYGIENE: INDEPENDENT;PROMPTS
LAUNDRY: UNABLE TO COMPLETE

## 2021-11-10 NOTE — PLAN OF CARE
Pt resting in bed after returning from bathroom use.  Staff observed 350 mL yellow urine in collection unit and some in toilet.  Pt slow to respond verbally but opens eyes spontaneously to staff prompts.  Writer assisted him to reposition higher in his bed.    VS: BP (!) 88/63 (BP Location: Right arm, Patient Position: Supine)   Pulse 106   Temp 99.3  F (37.4  C) (Oral)   Resp 16   Ht 1.829 m (6')   Wt 79.3 kg (174 lb 12.8 oz)   SpO2 92%   BMI 23.71 kg/m    Encouraged fluid intake.  He drank approximately 50 mL at this time with staff assist/encouragement at bedside.

## 2021-11-10 NOTE — PLAN OF CARE
Patient continues to isolate to his room. Has been in his room sleeping. Continues to say that he is tired and wants to be left alone. Continues to respond to internal stimuli. Patient continues to need encouragement to eat and drink. Affect is irritable at times and other times cooperative with prompts to eat and drink.    Patient evaluation continues. Assessed mood,anxiety,thoughts and behavior.     Patient gradually progressing towards goals.    Patient is encouraged to participate in groups and assisted to develop healthy coping skills.     VS reviewed: /86   Pulse 111   Temp 99.7  F (37.6  C) (Oral)   Resp 16   Ht 1.829 m (6')   Wt 79.3 kg (174 lb 12.8 oz)   SpO2 93%   BMI 23.71 kg/m      Length of stay: 132    Refer to daily team meeting notes for individualized plan of care. Nursing will continue to assess.

## 2021-11-10 NOTE — PROGRESS NOTES
Patient drank 1 boost drink this evening as well as 1 1/2 cartons of chocolate milk. Ate about 1/4 of his mashed potato, gravy,potato dish. A few bites of his pineapples and about 3 ounces of gatorade.

## 2021-11-10 NOTE — PROGRESS NOTES
Patient has been sleeping majority of the shift unless to use the bathroom or eat. Patient had 100% of a magic cup, 4 ounces of his boost and a couple bites of mashed potatoes. Patient continues to be distracted and talking to himself. Affect is flat and labile.     Patient evaluation continues. Assessed mood,anxiety,thoughts and behavior.     Patient is encouraged to participate in groups and assisted to develop healthy coping skills.     VS reviewed: /76 (BP Location: Left arm)   Pulse 94   Temp 98.4  F (36.9  C) (Temporal)   Resp 16   Ht 1.829 m (6')   Wt 79.3 kg (174 lb 12.8 oz)   SpO2 92%   BMI 23.71 kg/m      Length of stay: 133    Refer to daily team meeting notes for individualized plan of care. Nursing will continue to assess.

## 2021-11-10 NOTE — PROGRESS NOTES
Patient's appetite has been good this morning. Ate 90% of his 2 slices of Kyrgyz toast and sausage. 1/2 of a banana. Drank 8 ounce of gatorade, 4 ounces of apples juices and some sips of water with his meds. Patient continues to need encouragement to eat and drink. Encouraged to come out to the milieu but has refused. Will attempt again later.

## 2021-11-11 ENCOUNTER — APPOINTMENT (OUTPATIENT)
Dept: CT IMAGING | Facility: CLINIC | Age: 56
DRG: 885 | End: 2021-11-11
Attending: PSYCHIATRY & NEUROLOGY
Payer: COMMERCIAL

## 2021-11-11 LAB
BACTERIA BLD CULT: NO GROWTH
BASOPHILS # BLD AUTO: 0.1 10E3/UL (ref 0–0.2)
BASOPHILS NFR BLD AUTO: 1 %
EOSINOPHIL # BLD AUTO: 1.1 10E3/UL (ref 0–0.7)
EOSINOPHIL NFR BLD AUTO: 10 %
ERYTHROCYTE [DISTWIDTH] IN BLOOD BY AUTOMATED COUNT: 13.2 % (ref 10–15)
HCT VFR BLD AUTO: 42.3 % (ref 40–53)
HGB BLD-MCNC: 14.2 G/DL (ref 13.3–17.7)
IMM GRANULOCYTES # BLD: 0.1 10E3/UL
IMM GRANULOCYTES NFR BLD: 1 %
LYMPHOCYTES # BLD AUTO: 1.2 10E3/UL (ref 0.8–5.3)
LYMPHOCYTES NFR BLD AUTO: 10 %
MCH RBC QN AUTO: 30.6 PG (ref 26.5–33)
MCHC RBC AUTO-ENTMCNC: 33.6 G/DL (ref 31.5–36.5)
MCV RBC AUTO: 91 FL (ref 78–100)
MONOCYTES # BLD AUTO: 1.2 10E3/UL (ref 0–1.3)
MONOCYTES NFR BLD AUTO: 10 %
NEUTROPHILS # BLD AUTO: 7.9 10E3/UL (ref 1.6–8.3)
NEUTROPHILS NFR BLD AUTO: 68 %
NRBC # BLD AUTO: 0 10E3/UL
NRBC BLD AUTO-RTO: 0 /100
PLATELET # BLD AUTO: 278 10E3/UL (ref 150–450)
RBC # BLD AUTO: 4.64 10E6/UL (ref 4.4–5.9)
WBC # BLD AUTO: 11.6 10E3/UL (ref 4–11)

## 2021-11-11 PROCEDURE — 85025 COMPLETE CBC W/AUTO DIFF WBC: CPT | Performed by: PSYCHIATRY & NEUROLOGY

## 2021-11-11 PROCEDURE — 36415 COLL VENOUS BLD VENIPUNCTURE: CPT | Performed by: PSYCHIATRY & NEUROLOGY

## 2021-11-11 PROCEDURE — 70450 CT HEAD/BRAIN W/O DYE: CPT

## 2021-11-11 PROCEDURE — 99233 SBSQ HOSP IP/OBS HIGH 50: CPT | Performed by: PSYCHIATRY & NEUROLOGY

## 2021-11-11 PROCEDURE — 250N000013 HC RX MED GY IP 250 OP 250 PS 637: Performed by: PHYSICIAN ASSISTANT

## 2021-11-11 PROCEDURE — 250N000013 HC RX MED GY IP 250 OP 250 PS 637: Performed by: PSYCHIATRY & NEUROLOGY

## 2021-11-11 PROCEDURE — 124N000003 HC R&B MH SENIOR/ADOLESCENT

## 2021-11-11 PROCEDURE — 70450 CT HEAD/BRAIN W/O DYE: CPT | Mod: 26 | Performed by: RADIOLOGY

## 2021-11-11 PROCEDURE — 99233 SBSQ HOSP IP/OBS HIGH 50: CPT | Performed by: PHYSICIAN ASSISTANT

## 2021-11-11 RX ADMIN — MEMANTINE 5 MG: 5 TABLET ORAL at 19:40

## 2021-11-11 RX ADMIN — CHOLECALCIFEROL CAP 1.25 MG (50000 UNIT) 1250 MCG: 1.25 CAP at 09:37

## 2021-11-11 RX ADMIN — MEMANTINE 5 MG: 5 TABLET ORAL at 08:14

## 2021-11-11 RX ADMIN — CLOZAPINE 350 MG: 100 TABLET ORAL at 20:02

## 2021-11-11 RX ADMIN — Medication 50 MCG: at 08:14

## 2021-11-11 RX ADMIN — METOPROLOL TARTRATE 25 MG: 25 TABLET, FILM COATED ORAL at 19:41

## 2021-11-11 RX ADMIN — SALMETEROL XINAFOATE 1 PUFF: 50 POWDER, METERED ORAL; RESPIRATORY (INHALATION) at 19:41

## 2021-11-11 RX ADMIN — ASPIRIN 81 MG CHEWABLE TABLET 81 MG: 81 TABLET CHEWABLE at 08:14

## 2021-11-11 RX ADMIN — ATORVASTATIN CALCIUM 80 MG: 80 TABLET, FILM COATED ORAL at 19:40

## 2021-11-11 RX ADMIN — METOPROLOL TARTRATE 25 MG: 25 TABLET, FILM COATED ORAL at 08:14

## 2021-11-11 RX ADMIN — LEVOTHYROXINE SODIUM 88 MCG: 88 TABLET ORAL at 08:14

## 2021-11-11 RX ADMIN — SALMETEROL XINAFOATE 1 PUFF: 50 POWDER, METERED ORAL; RESPIRATORY (INHALATION) at 08:15

## 2021-11-11 ASSESSMENT — ACTIVITIES OF DAILY LIVING (ADL)
HYGIENE/GROOMING: INDEPENDENT;PROMPTS
ORAL_HYGIENE: INDEPENDENT;PROMPTS
LAUNDRY: UNABLE TO COMPLETE
HYGIENE/GROOMING: WITH ASSISTANCE
HYGIENE/GROOMING: INDEPENDENT
ORAL_HYGIENE: INDEPENDENT
DRESS: WITH ASSISTANCE
LAUNDRY: WITH SUPERVISION
DRESS: INDEPENDENT
DRESS: INDEPENDENT;PROMPTS
ORAL_HYGIENE: WITH ASSISTANCE

## 2021-11-11 ASSESSMENT — MIFFLIN-ST. JEOR: SCORE: 1661.33

## 2021-11-11 NOTE — PLAN OF CARE
Problem: Behavioral Health Plan of Care  Goal: Absence of New-Onset Illness or Injury  Outcome: Declining    Patient has an order for CT procedure.  The CT department called to notify the unit they are ready for the patient.  Patient required assistance to sit up in bed and a staff of two to transfer the patient from the bed to the wheelchair.     Medication is administered while patient is in the wheelchair.  While administrating patients medication, the patient did not raise his arms to accept the med cup and water.  This writer, placed the cup to the patients mouth were he opened his mouth to accept and opened his mouth for fluids.     Patient is escorted to the CT department by Security and [facility].  Ticket to Ride and Code 2 is verified.   Patient returned from the procedure without incident.           Problem: Malnutrition  Goal: Improved Nutritional Intake  Outcome: No Change

## 2021-11-11 NOTE — PROGRESS NOTES
"[facility], [place]   Psychiatric Progress Note  Hospital Day: 134        Interim History:   The patient's care was discussed with the treatment team during the daily team meeting and/or staff's chart notes were reviewed.  Staff report patient has continued to decline to leave room, did get up and sit in chair and eat some of his meal yesterday, appetite was improved in the morning yesterday, taking medications as prescribed, continues to report being tired, observed responding to internal stimuli, no acute events overnight.     Upon interview pt was lying in bed, did not wake to voice, woke briefly to gentle touch on shoulders and foot. Reports he is \"tired\", denies having any pain or discomfort, denies having any SI or HI. Having AH. Again encouraged him to spend more time out of bed and to improve intake, he would not keep eyes open or respond and was sleeping.     Writer participated in care conference with [facility], CTC supervisor, System Director for Clinical Services, Internal Medicine and Ethics to discuss next steps in care for patient, see IM progress note for complete details. Discussed having patient transfer to  as had been discussed for past several weeks for change in milieu to see if this would be therapeutic for patients FTT if related to LOS in hospital and will attempt placement of feeding tube tomorrow. Given patient has not had unstable labs or vital signs, IM provider did not feel patient met criteria for transfer to [facility]. Writer discussed with Dr. Morfin who was in agreement with transfer to  when bed available.         Medications:       aspirin  81 mg Oral Daily     atorvastatin  80 mg Oral At Bedtime     cholecalciferol  1,250 mcg Oral Q7 Days     cloZAPine  350 mg Oral At Bedtime     levothyroxine  88 mcg Oral Daily     memantine  5 mg Oral BID     metoprolol tartrate  25 mg Oral BID     salmeterol  1 puff Inhalation BID     vitamin D3  50 mcg Oral Daily " "         Allergies:     Allergies   Allergen Reactions     Ibuprofen      Latex           Labs:     No results found for this or any previous visit (from the past 48 hour(s)).       Psychiatric Examination:     /78   Pulse 102   Temp 98.5  F (36.9  C) (Oral)   Resp 16   Ht 1.829 m (6')   Wt 79.3 kg (174 lb 12.8 oz)   SpO2 96%   BMI 23.71 kg/m    Weight is 174 lbs 12.8 oz  Body mass index is 23.71 kg/m .    Orthostatic Vitals  Report      Most Recent      Lying Orthostatic BP 95/63 11/11 1350    Lying Orthostatic Pulse (bpm) 80 11/11 1350        Appearance: disheveled, awoke briefly to touch  Attitude:  Less cooperative  Eye Contact:  poor, eyes remained closed   Mood:  \"tired\"   Affect:  mood congruent and intensity is flat  Speech:  soft volume, raspy, dysarthric  Language: fluent and intact in English  Psychomotor, Gait, Musculoskeletal:  no evidence of dystonia, or tics, no noted TD movements as patient mostly sleeping and difficult to rouse, when awake previous days movements of buccolingual dyskinesia present   Thought Process: disorganized  Associations:  no loose associations  Thought Content:  no evidence of SI or HI; pt remains delusional with AVH  Insight:  limited  Judgement:  limited  Oriented to:  person  Attention Span and Concentration:  limited  Recent and Remote Memory:  limited  Fund of Knowledge:  delayed    Clinical Global Impressions  First:  Considering your total clinical experience with this particular patient population, how severe are the patient's symptoms at this time?: 7 (07/01/21 0630)  Compared to the patient's condition at the START of treatment, this patient's condition is: 4 (07/01/21 0630)  Most recent:  Considering your total clinical experience with this particular patient population, how severe are the patient's symptoms at this time?: 6 (11/01/21 1609)  Compared to the patient's condition at the START of treatment, this patient's condition is: 5 (11/01/21 1609)     "     Precautions:     Behavioral Orders   Procedures     Assault precautions     Cheeking Precautions (behavioral units)     Patient Observed swallowing PO medications; Patient asked to drink water after swallowing medication; Patient in Staff line of sight for 15 minutes after medication given; Mouth checks after PO administration (patient asked to open mouth and stick out their tongue).     Code 1     Code 2     With Security for any imaging/testing needed.     Elopement precautions     Fall precautions     Routine Programming     As clinically indicated     Single Room     Status 15     Every 15 minutes.          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   Stimulant use disorder, in remission.   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 Is currently under commitment with [employer] recently amended to include Clozaril. While at [facility] was noted to have ongoing agitation and frequently attempting to elope from unit requiring 1:1 staffing for safety. He was started on 1:1 staff upon transfer, this was discontinued as patient has been more cooperative without elopement attempts. IM consult completed on admission and continued to follow due to  elevated LFTs. Haldol and VPA discontinued due to LFTs. Cross titration completed from risperidone to clozaril after Yanez amended. EPSE/TD movements noted, have appeared stable over past few weeks. Patient has continued to have disorganization and active psychosis symptoms which appear to be at patients baseline. Team continues to work on disposition which barriers include patient has no guardian or next of kin willing to act as surrogate decision maker, see CTC notes for complete details. Patient starting to have increasing symptoms noted week of 9/20, clozapine level ordered and noted to be lower than previous, have added cheeking precautions and increased clozapine dose on 9/24 and moved to split dosing given increasing afternoon agitation. Pt having decreased appetite, increased fatigue over weekend of 10/9-10/10, IM consult placed 10/11, KUB Xray showed large stool burden, bowel regimen modified. Pt was incontinent of stool evening of 10/13. Consolidated clozapine dose to bedtime given concern for daytime sedation with split dosing. Ongoing malaise noted starting 10/11, internal medicine has been contacted periodically. Noted 18# weight loss in 2 months, IM contacted for possible FTT on 11/4. Ethics consult placed 11/5. Neurology re-consulted 11/8.     Psychiatric treatment/inteventions:  Medications:   -continue clozapine 350mg at bedtime, reduced over the past few weeks due to ongoing sedation/fatigue and now prolonged QTc; continue to monitor movements consistent with TD  -continue cheeking precautions, started 9/27 given noted decompensation in recent weeks and lower clozapine level   -continue memantine at 5mg daily for neurocognitive disorder, dose was up to 10mg BID and have been reducing dose, may taper off as patient does not appear to have benefit and reporting oversedation and to reduce anticholinergic load  -continue risperdal 1mg Q6H PRN agitation   -continue lorazepam 0.5-1.0mg Q4H PRN anxiety  or severe agitation    -discontinued benztropine 0.25mg BID on 10/21, tapered off to reduce anticholinergic load  -discontinued rivastigmine patch on 10/21,pt did not seemed to have benefit and to reduce anticholinergic load    -Ethics consult placed 11/5 due to patient lacking decision making capacity without guardian or surrogate decision maker and possible need for medical interventions including NJ/NG or PEG tube in future, appreciate assistance, care conference completed today as above     Laboratory/Imaging: weekly WBC and ANC for clozapine monitoring continues ANC 11/11 is 7.9; other labs/imaging continue per IM:    CT Head completed 11/11:   Impression:  1. No acute intracranial abnormalities.  2. Moderate cerebral volume loss with chronic small vessel ischemic  changes.  3. Chronic lacunar infarcts.     I have personally reviewed the examination and initial interpretation  and I agree with the findings.     SINGH HECK MD          Patient will be treated in therapeutic milieu with appropriate individual and group therapies as described.     Medical treatment/interventions:  Medical concerns:   IM re-consulted due to return of fever and ongoing malaise 10/22, additional labs ordered along with Echo. Also placed Neurology consult to rule out any possibility of seizure activity. Neurology did not feel any further work up warranted. Pt continued to have poor intake, now with tachycardia, dark urine and hypotension and possible thrush, also concern for bed sores and loose stools. Pt was given 1L IVF on 10/29, BP improved, IM signed off. Having low O2 sats, ongoing malaise, IM contacted by RN. Obtained medical bed for patient. RN checking with IM 11/2 to see if IV should be pulled or if patient requires more fluids. Discussed concern for failure to thrive with IM on 11/4 who re-evaluated patient and have continued to follow as below. Also re-consulted Neurology 11/8 in discussion with IM, appreciate  assistance, see consult note 11/8 for complete details.     Per IM progress note 11/11:  Assessment and plan:  John Juárez is a 55 year old male admitted on 6/30/2021. He has a history of schizophrenia, TBI, alcohol use disorder, COPD, HLD, cardiomyopathy (now resolved), endocarditis s/p bioprosthetic valve replacement (2015), CVA, and hypothyroidism. Please see initial IM consult from 7/1/21 for details regarding chronic medical conditions. Medicine closely following over the past several weeks due to failure to thrive.     Severe protein calorie malnutrition, failure to thrive: See medicine notes 11/6 and prior for details and workup. Patient continues to refuse almost all oral intake. Weight is dropping. Physical conditioning is worsening  - Psychiatry team, ethics and writer met 11/11 to determine next steps going forward. Patient is a poor candidate for TPN given risk of infection and hygiene state. PEG could be considered if needed but also at risk for infection, is a major surgical procedure and would need consent (but patient is not decisional and no guardian in place to assist in making decisions). Per d/w psychiatry and ethics: will trial NG tube placement for tube feeds. Per Ethics: if patient refuses, pulls tube, or is unable to tolerate TF at anytime tube will be pulled and not replaced.   - Will re-consult nutrition for tube feeds after NG placed  - BMP, Mg, phos 11/12  - Consider palliative care consult pending success vs failure with NG tube  - Nutrition following, calorie counts   - Consider starting Remeron for possible depression and appetite stimulant, consider Marinol for appetite stimulant, or adderall      Transient hypoxia: Resolved. Occurred 11/6 and 11/7. Workup benign including CXR, CT PE, COVID. O2 sats 90s over the past several days  - Continue salmeterol and albuterol   - IS, acapella  - Continue close monitoring, contact medicine if O2 sats <90% while in bed     Positive blood x1:  1/2 bottles positive for GPC with verigene GP panel positive for staphylococcus species likely contaminant from skin deana. Repeat BCx 11/7 NGTD  - Follow cultures  - No indication for abx unless clinical changes  - If patient develops fever to 100.4F or above please notify medicine and will likely need to be transferred to medicine for IV abx     Major neurocognitive disorder, h/o TBI and alcohol use disorder, schizophrenia: Under commitment. Previously resided in a group home. Group home unwilling to take him back. Numerous CODE 21s called. Currently under civil commitment and court hold through [facility]   - Management per psychiatry         Medicine will continue to follow    This note was created by undersigned using a Dragon dictation system. All typing errors or contextual distortion are unintentional and software inherent.     Disposition Plan   Reason for ongoing admission: is unable to care for self due to severe psychosis or mariusz and neurocognitive disorder   Discharge location: [facility], likely locked NH facility , unable to progress on discharge planning due to guardianship not being in place, see CTC notes  Discharge Medications: not ordered  Follow-up Appointments: not scheduled  Legal Status: Full MI commitment and Yanez     Discussed transferring patient to  with Dr. Morfin, he is agreeable to transfer under his care to .     Entered by: Jeanette Dobbins on 11/11/2021 at 6:25 AM

## 2021-11-11 NOTE — PLAN OF CARE
BEHAVIORAL TEAM DISCUSSION    Participants:   Dr. Dobbins, Melissa Fang [facility], Minh Alaniz RN    Progress:   Pt continues to decline physically.  He does not get out of bed.  Medical work- up has not yielded an origin of his malaise.    Anticipated length of stay:    one week - may be transferred to medicine.    Continued Stay Criteria/Rationale:   The pt is not able to care for himself      Medical/Physical:   Pt is unresponsive to nursing touch,  Needs 2 staff to transfer  Staff need to feed the pt    Precautions:   Behavioral Orders   Procedures     Assault precautions     Cheeking Precautions (behavioral units)     Patient Observed swallowing PO medications; Patient asked to drink water after swallowing medication; Patient in Staff line of sight for 15 minutes after medication given; Mouth checks after PO administration (patient asked to open mouth and stick out their tongue).     Code 1     Code 2     With Security for any imaging/testing needed.     Elopement precautions     Fall precautions     Routine Programming     As clinically indicated     Single Room     Status 15     Every 15 minutes.     Plan:   Help pt eat  Medication Management  Ethics conference today at 2 with medicine      Rationale for change in precautions or plan: pt is decompensating physically

## 2021-11-11 NOTE — PLAN OF CARE
Assessment/Intervention/Current Symtoms and Care Coordination  -Chart review  -Pre round meeting with team  -Rounded with team, addressed patient needs/concerns  -Post round meeting with team  Current Symptoms include the following: Psychosis  Pt is physically decompensating.  RN reporting that the pt is unresponsive to voice and touch      Medicine/Ethics phone conference is 2pm today.  Decision was made to transfer pt to  and place a feeding tube unless the pt resists this.    Discharge Plan or Goal  Considerations include: medical unit    Barriers to Discharge  Patient does not have a guardian. Efforts to get a guardian have lizandro unsuccessful.    Referral Status  has medical needs    Legal Status  Patient is under MI commitment in [facility]

## 2021-11-11 NOTE — PROGRESS NOTES
Internal Medicine Progress Note      Assessment and plan:  John Juárez is a 55 year old male admitted on 6/30/2021. He has a history of schizophrenia, TBI, alcohol use disorder, COPD, HLD, cardiomyopathy (now resolved), endocarditis s/p bioprosthetic valve replacement (2015), CVA, and hypothyroidism. Please see initial IM consult from 7/1/21 for details regarding chronic medical conditions. Medicine closely following over the past several weeks due to failure to thrive.        Severe protein calorie malnutrition, failure to thrive: See medicine notes 11/6 and prior for details and workup. Patient continues to refuse almost all oral intake. Weight is dropping. Physical conditioning is worsening  - Psychiatry team, ethics and writer met 11/11 to determine next steps going forward. Patient is a poor candidate for TPN given risk of infection and hygiene state. PEG could be considered if needed but also at risk for infection, is a major surgical procedure and would need consent (but patient is not decisional and no guardian in place to assist in making decisions). Per d/w psychiatry and ethics: will trial NG tube placement for tube feeds. Per Ethics: if patient refuses, pulls tube, or is unable to tolerate TF at anytime tube will be pulled and not replaced.   - Will re-consult nutrition for tube feeds after NG placed  - BMP, Mg, phos 11/12  - Consider palliative care consult pending success vs failure with NG tube  - Nutrition following, calorie counts   - Consider starting Remeron for possible depression and appetite stimulant, consider Marinol for appetite stimulant, or adderall      Transient hypoxia: Resolved. Occurred 11/6 and 11/7. Workup benign including CXR, CT PE, COVID. O2 sats 90s over the past several days  - Continue salmeterol and albuterol   - IS, acapella  - Continue close monitoring, contact medicine if O2 sats <90% while in bed    Positive blood x1: 1/2 bottles positive for GPC with verigene GP  panel positive for staphylococcus species likely contaminant from skin deana. Repeat BCx 11/7 NGTD  - Follow cultures  - No indication for abx unless clinical changes  - If patient develops fever to 100.4F or above please notify medicine and will likely need to be transferred to medicine for IV abx     Major neurocognitive disorder, h/o TBI and alcohol use disorder, schizophrenia: Under commitment. Previously resided in a group home. Group home unwilling to take him back. Numerous CODE 21s called. Currently under civil commitment and court hold through [facility]   - Management per psychiatry         Medicine will continue to follow      Objective:  BP 90/65 (BP Location: Left arm)   Pulse 94   Temp 97  F (36.1  C) (Tympanic)   Resp 16   Ht 1.829 m (6')   Wt 79.8 kg (176 lb)   SpO2 96%   BMI 23.87 kg/m      Vitals signs reviewed and noted    GENERAL: Tapping but arouses easily. Oriented to self only. In bed. Appears chronically ill. Disheveled   HEENT: Anicteric sclera. MMM  CV: RRR, S1, S2. No murmur noted  RESPIRATORY: Effort normal on room air. Lungs CTAB with no wheezing or rales  GI: Abdomen soft, non-tender with active bowel sounds. No rebound or guarding  SKIN: No jaundice, no rash    Pertinent labs and procedures were reviewed     Subjective:   No acute changes. Not interested in eating lunch. Denies abdominal pain, N/V. Not hungry. Denies chest pain or dyspnea.     Twyla Kumar PA-C  Internal Medicine  [phone]

## 2021-11-11 NOTE — PROGRESS NOTES
Patient continues to isolate to his room. Writer insisted that patient get out of his bed and sit in his chair to eat dinner. Patient complied after much yelling and initial resistance. Patient sat for about 30 minutes. Took about 3 bites of his mashed potatoes/gravy and shredded meat. Patient also independently opened a carton of chocolate milk drinking the whole container. He then proceeded to open another container only drinking 1/2 of it. Patient then became irritated and jumped back into his bed refusing to eat any more. Continues to appear to be responding to internal stimuli. Appears tired. Med compliant. Cooperative with vitals. Urinating fine.     Patient evaluation continues. Assessed mood,anxiety,thoughts and behavior.     Patient gradually progressing towards goals.    Patient is encouraged to participate in groups and assisted to develop healthy coping skills.     VS reviewed: /78   Pulse 102   Temp 98.5  F (36.9  C) (Oral)   Resp 16   Ht 1.829 m (6')   Wt 79.3 kg (174 lb 12.8 oz)   SpO2 96%   BMI 23.71 kg/m      Length of stay: 133    Refer to daily team meeting notes for individualized plan of care. Nursing will continue to assess.

## 2021-11-11 NOTE — PLAN OF CARE
Night Nursing Note  11/10/21 - 11/11/21        John was in bed at beginning of shift and appeared asleep.  Monitored q15 mins for safety.  Head of bed elevated.  Noted to be snoring loudly at intervals throughout night.  Intake (0) output (0).  Will continue to monitor per care plan.    Slept   6.75 hrs

## 2021-11-12 LAB
ANION GAP SERPL CALCULATED.3IONS-SCNC: 4 MMOL/L (ref 3–14)
BACTERIA BLD CULT: NO GROWTH
BACTERIA BLD CULT: NO GROWTH
BUN SERPL-MCNC: 18 MG/DL (ref 7–30)
CALCIUM SERPL-MCNC: 10 MG/DL (ref 8.5–10.1)
CHLORIDE BLD-SCNC: 110 MMOL/L (ref 94–109)
CO2 SERPL-SCNC: 24 MMOL/L (ref 20–32)
CREAT SERPL-MCNC: 0.71 MG/DL (ref 0.66–1.25)
GFR SERPL CREATININE-BSD FRML MDRD: >90 ML/MIN/1.73M2
GLUCOSE BLD-MCNC: 118 MG/DL (ref 70–99)
MAGNESIUM SERPL-MCNC: 2.2 MG/DL (ref 1.6–2.3)
PHOSPHATE SERPL-MCNC: 4.1 MG/DL (ref 2.5–4.5)
POTASSIUM BLD-SCNC: 3.7 MMOL/L (ref 3.4–5.3)
SODIUM SERPL-SCNC: 138 MMOL/L (ref 133–144)

## 2021-11-12 PROCEDURE — 250N000013 HC RX MED GY IP 250 OP 250 PS 637: Performed by: PSYCHIATRY & NEUROLOGY

## 2021-11-12 PROCEDURE — 82310 ASSAY OF CALCIUM: CPT | Performed by: PHYSICIAN ASSISTANT

## 2021-11-12 PROCEDURE — 83735 ASSAY OF MAGNESIUM: CPT | Performed by: PHYSICIAN ASSISTANT

## 2021-11-12 PROCEDURE — 36415 COLL VENOUS BLD VENIPUNCTURE: CPT | Performed by: PHYSICIAN ASSISTANT

## 2021-11-12 PROCEDURE — 99233 SBSQ HOSP IP/OBS HIGH 50: CPT | Performed by: PSYCHIATRY & NEUROLOGY

## 2021-11-12 PROCEDURE — 84100 ASSAY OF PHOSPHORUS: CPT | Performed by: PHYSICIAN ASSISTANT

## 2021-11-12 PROCEDURE — 124N000003 HC R&B MH SENIOR/ADOLESCENT

## 2021-11-12 RX ORDER — CLOZAPINE 100 MG/1
300 TABLET ORAL AT BEDTIME
Status: DISCONTINUED | OUTPATIENT
Start: 2021-11-12 | End: 2021-11-22

## 2021-11-12 RX ADMIN — MEMANTINE 5 MG: 5 TABLET ORAL at 21:13

## 2021-11-12 RX ADMIN — ASPIRIN 81 MG CHEWABLE TABLET 81 MG: 81 TABLET CHEWABLE at 09:54

## 2021-11-12 RX ADMIN — LEVOTHYROXINE SODIUM 88 MCG: 88 TABLET ORAL at 09:54

## 2021-11-12 RX ADMIN — SALMETEROL XINAFOATE 1 PUFF: 50 POWDER, METERED ORAL; RESPIRATORY (INHALATION) at 09:54

## 2021-11-12 RX ADMIN — Medication 50 MCG: at 09:53

## 2021-11-12 RX ADMIN — MEMANTINE 5 MG: 5 TABLET ORAL at 09:53

## 2021-11-12 RX ADMIN — CLOZAPINE 300 MG: 100 TABLET ORAL at 21:12

## 2021-11-12 RX ADMIN — SALMETEROL XINAFOATE 1 PUFF: 50 POWDER, METERED ORAL; RESPIRATORY (INHALATION) at 21:13

## 2021-11-12 ASSESSMENT — ACTIVITIES OF DAILY LIVING (ADL)
LAUNDRY: UNABLE TO COMPLETE
DRESS: SCRUBS (BEHAVIORAL HEALTH)
ORAL_HYGIENE: PROMPTS

## 2021-11-12 NOTE — PLAN OF CARE
Problem: Sleep Disturbance  Goal: Adequate Sleep/Rest  Outcome: Improving     Patient slept soundly for a total of11  hours. No concerns raised the whole shift. Intake -0. Output-0.     He is scheduled for lab draw today- Phosphorus, Magnesium and BMP. He is also scheduled for Covid testing  and Tube Feeding placement today.

## 2021-11-12 NOTE — PLAN OF CARE
BEHAVIORAL TEAM DISCUSSION     Participants: Wilton Morfin MD; Joi Stearns OT; [facility];      Progress: Pt was transferred from Station 10. Assessment is ongoing to determine adjustments and implementations necessary for continued wellness. Noted for decline, physically.  Pt does not get out of bed.  Medical work- up has not yielded an origin of his malaise.     Anticipated length of stay:    one week - may be transferred to medicine.     Continued Stay Criteria/Rationale:   The pt is not able to care for himself        Medical/Physical: See medical for details  Needs 2 staff to transfer  Staff need to feed the pt     Precautions:        Behavioral Orders   Procedures     Assault precautions     Cheeking Precautions (behavioral units)       Patient Observed swallowing PO medications; Patient asked to drink water after swallowing medication; Patient in Staff line of sight for 15 minutes after medication given; Mouth checks after PO administration (patient asked to open mouth and stick out their tongue).     Code 1     Code 2       With Security for any imaging/testing needed.     Elopement precautions     Fall precautions     Routine Programming       As clinically indicated     Single Room     Status 15       Every 15 minutes.      Plan:   Help pt eat  Medication Management  Ethics consultations as needed  Medications:      aspirin  81 mg Oral Daily     atorvastatin  80 mg Oral At Bedtime     cholecalciferol  1,250 mcg Oral Q7 Days     cloZAPine  300 mg Oral At Bedtime     levothyroxine  88 mcg Oral Daily     memantine  5 mg Oral BID     metoprolol tartrate  25 mg Oral BID     salmeterol  1 puff Inhalation BID     vitamin D3  50 mcg Oral Daily       Pt will be encouraged to engaged in the therapeutic milieu  Pt will be encouraged to attend groups to learn and practice positive skills to cope with his symptoms  Medical consultations will be implemented as needed  Care coordination will be  implemented.

## 2021-11-12 NOTE — PROGRESS NOTES
11/11/21 2101   Patient Belongings   Did you bring any home meds/supplements to the hospital?  No   Patient Belongings remains with patient;locker   Patient Belongings Remaining with Patient other (see comments)   Patient Belongings Put in Hospital Secure Location (Security or Locker, etc.) none   Belongings Search Yes   Clothing Search No   Comment Patient was internal transfer from unit 30     Patient was internal transfer from unit 30.  Patient belongings put into locker: pull ups, bag of snacks from the previous unit, clothing, paper work, ID, cigarettes, lighter, insurance card, handheld game, connect 4 game, 3 books, markers, play dough, deck of cards.    Patient belongings given to patient: reading glasses were put into patient's room.    A               Admission:  I am responsible for any personal items that are not sent to the safe or pharmacy.  [facility] is not responsible for loss, theft or damage of any property in my possession.    Signature:  _________________________________ Date: _______  Time: _____                                              Staff Signature:  ____________________________ Date: ________  Time: _____      2nd Staff person, if patient is unable/unwilling to sign:    Signature: ________________________________ Date: ________  Time: _____     Discharge:  [facility] has returned all of my personal belongings:    Signature: _________________________________ Date: ________  Time: _____                                          Staff Signature:  ____________________________ Date: ________  Time: _____

## 2021-11-12 NOTE — PROGRESS NOTES
"[facility], [place]   Psychiatric Progress Note        Interim History:   The patient's care was discussed with the treatment team during the daily team meeting and/or staff's chart notes were reviewed.  Staff report patient has been isolating more on Station 30. Did drink 1/2 of his Ensure and two juices and ate some yogurt this morning.     The patient states that he is \"tired.\" Says that he didn't sleep well last night. Says that he is too tired to eat anything right now but will drink his smoothie later. Denies SI. Is more verbally engaged this past visits.          Medications:       aspirin  81 mg Oral Daily     atorvastatin  80 mg Oral At Bedtime     cholecalciferol  1,250 mcg Oral Q7 Days     cloZAPine  300 mg Oral At Bedtime     levothyroxine  88 mcg Oral Daily     memantine  5 mg Oral BID     metoprolol tartrate  25 mg Oral BID     salmeterol  1 puff Inhalation BID     vitamin D3  50 mcg Oral Daily          Allergies:     Allergies   Allergen Reactions     Ibuprofen      Latex           Labs:     Recent Results (from the past 24 hour(s))   Basic metabolic panel    Collection Time: 11/12/21  8:38 AM   Result Value Ref Range    Sodium 138 133 - 144 mmol/L    Potassium 3.7 3.4 - 5.3 mmol/L    Chloride 110 (H) 94 - 109 mmol/L    Carbon Dioxide (CO2) 24 20 - 32 mmol/L    Anion Gap 4 3 - 14 mmol/L    Urea Nitrogen 18 7 - 30 mg/dL    Creatinine 0.71 0.66 - 1.25 mg/dL    Calcium 10.0 8.5 - 10.1 mg/dL    Glucose 118 (H) 70 - 99 mg/dL    GFR Estimate >90 >60 mL/min/1.73m2   Magnesium    Collection Time: 11/12/21  8:38 AM   Result Value Ref Range    Magnesium 2.2 1.6 - 2.3 mg/dL   Phosphorus    Collection Time: 11/12/21  8:38 AM   Result Value Ref Range    Phosphorus 4.1 2.5 - 4.5 mg/dL          Psychiatric Examination:     BP 93/65   Pulse 88   Temp 98.8  F (37.1  C) (Temporal)   Resp 16   Ht 1.829 m (6')   Wt 79.8 kg (176 lb)   SpO2 95%   BMI 23.87 kg/m    Weight is 176 lbs 0 oz  " "Body mass index is 23.87 kg/m .  Orthostatic Vitals  Report      Most Recent      Lying Orthostatic BP 75/58 11/12 0850    Lying Orthostatic Pulse (bpm) 84 11/12 0850            Appearance: awake, alert  Attitude:  somewhat cooperative  Eye Contact:  poor   Mood:  \"tired\"  Affect:  intensity is flat  Speech:  paucity of speech  Psychomotor Behavior:  no evidence of tardive dyskinesia, dystonia, or tics  Thought Process:  illogical  Associations:  no loose associations  Thought Content:  no evidence of suicidal ideation or homicidal ideation and obsessions present  Insight:  limited  Judgement:  limited  Oriented to:  person and place  Attention Span and Concentration:  fair  Recent and Remote Memory:  poor    Clinical Global Impressions  First:  Considering your total clinical experience with this particular patient population, how severe are the patient's symptoms at this time?: 7 (07/01/21 0630)  Compared to the patient's condition at the START of treatment, this patient's condition is: 4 (07/01/21 0630)  Most recent:  Considering your total clinical experience with this particular patient population, how severe are the patient's symptoms at this time?: 7 (11/09/21 1642)  Compared to the patient's condition at the START of treatment, this patient's condition is: 5 (11/09/21 1642)         Precautions:     Behavioral Orders   Procedures     Assault precautions     Cheeking Precautions (behavioral units)     Patient Observed swallowing PO medications; Patient asked to drink water after swallowing medication; Patient in Staff line of sight for 15 minutes after medication given; Mouth checks after PO administration (patient asked to open mouth and stick out their tongue).     Code 1     Code 2     With Security for any imaging/testing needed.     Elopement precautions     Fall precautions     Routine Programming     As clinically indicated     Single Room     Status 15     Every 15 minutes.          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   Stimulant use disorder, in remission.   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 Is currently under commitment with Yanez recently amended to include Clozaril. While at [facility] was noted to have ongoing agitation and frequently attempting to elope from unit requiring 1:1 staffing for safety. He was started on 1:1 staff upon transfer, this was discontinued as patient has been more cooperative without elopement attempts. IM consult completed on admission and continued to follow due to elevated LFTs. Haldol and VPA discontinued due to LFTs. Cross titration completed from risperidone to clozaril after Yanez amended. EPSE/TD movements noted, have appeared stable over past few weeks. Patient has continued to have disorganization and active psychosis symptoms which appear to be at patients baseline. Team continues to work on disposition which barriers include patient has no guardian or next of kin willing to act as surrogate decision maker, see CTC notes for complete details. Patient starting to have increasing symptoms noted week of 9/20, clozapine level ordered and noted to be lower than previous, have added cheeking  precautions and increased clozapine dose on 9/24 and moved to split dosing given increasing afternoon agitation. Pt having decreased appetite, increased fatigue over weekend of 10/9-10/10, IM consult placed 10/11, KUB Xray showed large stool burden, bowel regimen modified. Pt was incontinent of stool evening of 10/13. Consolidated clozapine dose to bedtime given concern for daytime sedation with split dosing. Ongoing malaise noted starting 10/11, internal medicine has been contacted periodically. Noted 18# weight loss in 2 months, IM contacted for possible FTT on 11/4. Ethics consult placed 11/5. Neurology re-consulted 11/8.      Psychiatric treatment/inteventions:  Medications:   -decrease clozapine to 300mg at bedtime, reduced over the past few weeks due to ongoing sedation/fatigue and now prolonged QTc; continue to monitor movements consistent with TD  -continue cheeking precautions, started 9/27 given noted decompensation in recent weeks and lower clozapine level   -continue memantine at 5mg daily for neurocognitive disorder, dose was up to 10mg BID and have been reducing dose, may taper off as patient does not appear to have benefit and reporting oversedation and to reduce anticholinergic load  -continue risperdal 1mg Q6H PRN agitation   -continue lorazepam 0.5-1.0mg Q4H PRN anxiety or severe agitation     -discontinued benztropine 0.25mg BID on 10/21, tapered off to reduce anticholinergic load  -discontinued rivastigmine patch on 10/21,pt did not seemed to have benefit and to reduce anticholinergic load     -Ethics consult placed 11/5 due to patient lacking decision making capacity without guardian or surrogate decision maker and possible need for medical interventions including NJ/NG or PEG tube in future, appreciate assistance, care conference on 11/11    Discussed plan for NG tube with medicine and agree to see how patient does on PO intake this weekend as he is eating and drinking some.     Disposition  Plan   Reason for ongoing admission: is unable to care for self due to severe psychosis or mariusz  Discharge location: group home  Discharge Medications: not ordered  Follow-up Appointments: not scheduled  Legal Status: full commitment    Entered by: Wilton Morfin on November 12, 2021 at 9:39 AM

## 2021-11-12 NOTE — PLAN OF CARE
"  Problem: Behavioral Health Plan of Care  Goal: Plan of Care Review  Recent Flowsheet Documentation  Taken 11/12/2021 2981 by Izzy Gallego RN  Plan of Care Reviewed With: patient  Patient Agreement with Plan of Care: refuses to participate     Problem: Psychotic Symptoms  Goal: Psychotic Symptoms  Description: Signs and symptoms of listed problems will be absent or manageable.  Outcome: No Change       Patient alert, oriented to self, confused about time, place, and situation. Patient mood is calm but easily irritable. Affect is flat/blunted. Patient spent all shift in his room, resting in bed, stating \"I am tired\". He became agitated and loud when staff encouraged him to come in the dinning room or take a shower. Patient got up and used the bathroom, supervision assistance, x2. Appetite is poor, patient refused breakfast and lunch but had two juices and some of his ensure. Patient allowed the writer to assess his coccyx area - skin intact, no redness.   Patient compliant with his medications.   Speech is slurred and confused.   No BM this shift.   "

## 2021-11-12 NOTE — PROGRESS NOTES
Brief medicine note:     John Juárez is a 55 year old male admitted on 6/30/2021. He has a history of schizophrenia, TBI, alcohol use disorder, COPD, HLD, cardiomyopathy (now resolved), endocarditis s/p bioprosthetic valve replacement (2015), CVA, and hypothyroidism. Please see initial IM consult from 7/1/21 for details regarding chronic medical conditions. Medicine closely following over the past several weeks due to failure to thrive. Please see medicine note 11/11/21 for detail regarding meeting with medicine, psychiatry and ethics. Plan established was trial of NG tube placement for tube feeds. Per ethics: if patient refuses, pulls tube, or unable to tolerate TF at anytime tube will be pulled and not replaced. Patient transferred to  11/11 evening.   - Following up BMP, mg and phos which are wnl   - Discussed with psychiatry, patient more awake today and drinking some 1/2 ensure and two juices and ate yogurt in new environment of     - Discussed plan for NG tube with psychiatry, agree to see how patient does on PO intake this weekend as he is eating and drinking some today   - Will continue to monitor electrolytes closely while patient PO intake improves  - Repeat BMP, Mag and phos in AM     ADDENDUM 11/13:   BMP, mag and phos remain wnl this AM. Per nursing notes, patient did not eat dinner 11/12, drank 200 ml Ensure, 100 ml milk and 360 ml water.   - Continue to document strict I and O   - Will assess Monday regarding placement of NG and initiation of tube feeds     Medicine will continue to follow.     No Red PA-C  [facility]  Securely message with the Vocera Web Console (learn more here)  Text page via [facility] Paging/Directory

## 2021-11-12 NOTE — PLAN OF CARE
Problem: Behavioral Disturbance  Goal: Behavioral Disturbance  Description: Signs and symptoms of listed problems will be absent or manageable by discharge or transition of care.  Outcome: No Change  Flowsheets  Taken 11/11/2021 2153 by Wen Broderick RN  Behavioral Disturbance Present:    speech    memory deficits    affect    mood    insight    thought process  Taken 8/19/2021 2216 by Lauar Mason RN  Behavioral Disturbance Assessed: all   Nursing Assessment    Behavior disturbance [F91.9]    Admit Date: 6/30/2021    Length of Stay: 134    Patient evaluation continues. Assessed mood,anxiety,thoughts and behavior. Patient is not progressing towards goals. Pt was transferred from sta 30 at 1900 accompanied by staff. Pt was cooperative and pleasant reporting that he wanted togo to his room to rest. Pt complained of being cold and received a warm blanket which was helpful. Pt was medication compliant on sta 30.Patient is encouraged to participate in groups and assisted to develop healthy coping skills.  Patient denies auditory or visual hallucinations at this time.     Mood: pt states that he is sad and had little else to say. Pt denied anxiety. Pt had body odor but stated he was too tired at 2200 to shower. Will encourage shower in am. Pt was continent of urine in toilet.  Pt denies any pain or nausea. Not interested in a snack.    Patient reports depression : 7/10 and reports anxiety as not much.     Affect:flat blunted    Sleep: sleeping at this time    Appetite: poor, see orders in chart for tomorrow     SI: denies    HI: denies    SIB: denies      Medication Compliance  medication complaint    Group participation:no    ADL's: encouragement    Fall risk interventions: proper foot wear monitor for unsteady gait, education,     Rinku Score Interventions:none    Discharge planning none at this time    Refer to daily team meeting notes for individualized plan of care. Nursing will continue to  assess.    *Scale is 1-10 and 10 is the worst.

## 2021-11-12 NOTE — PLAN OF CARE
Problem: OT General Care Plan  Goal: OT Goal 1  Description: OT Goal 1  Will attend OT groups and participate actively in all OT opportunities. Will assess and set goals.        Note: Pt transferred to  and did not attend OT groups today.  He will be invited and encouraged to attend when able and willing. OT will re orient him to the OT groups here with invitations.

## 2021-11-12 NOTE — PROGRESS NOTES
Patient has spent the early part of this shift isolative to his room. Patient drank 1 ensure container. Later after getting him in the wheelchair for transport ate 1 whole banana and 100% of the 475 calories shake. Patient continues to yell out that he is tired and just wants to sleep. Patient continues to appear to be responding to internal stimuli, talking under his breath. Makes statements of wanting to leave. Med compliant. Cooperative with the transfer to station 3B.     Patient evaluation continues. Assessed mood,anxiety,thoughts and behavior.     Patient slowly progressing towards goals.    Patient is encouraged to participate in groups and assisted to develop healthy coping skills.     VS reviewed: /81   Pulse 112   Temp 97.9  F (36.6  C) (Oral)   Resp 16   Ht 1.829 m (6')   Wt 79.8 kg (176 lb)   SpO2 95%   BMI 23.87 kg/m      Length of stay: 134    Refer to daily team meeting notes for individualized plan of care. Nursing will continue to assess.

## 2021-11-13 LAB
ANION GAP SERPL CALCULATED.3IONS-SCNC: 6 MMOL/L (ref 3–14)
BUN SERPL-MCNC: 16 MG/DL (ref 7–30)
CALCIUM SERPL-MCNC: 9.5 MG/DL (ref 8.5–10.1)
CHLORIDE BLD-SCNC: 107 MMOL/L (ref 94–109)
CO2 SERPL-SCNC: 25 MMOL/L (ref 20–32)
CREAT SERPL-MCNC: 0.77 MG/DL (ref 0.66–1.25)
GFR SERPL CREATININE-BSD FRML MDRD: >90 ML/MIN/1.73M2
GLUCOSE BLD-MCNC: 114 MG/DL (ref 70–99)
MAGNESIUM SERPL-MCNC: 2 MG/DL (ref 1.6–2.3)
PHOSPHATE SERPL-MCNC: 3.8 MG/DL (ref 2.5–4.5)
POTASSIUM BLD-SCNC: 3.9 MMOL/L (ref 3.4–5.3)
SODIUM SERPL-SCNC: 138 MMOL/L (ref 133–144)

## 2021-11-13 PROCEDURE — 250N000013 HC RX MED GY IP 250 OP 250 PS 637: Performed by: PSYCHIATRY & NEUROLOGY

## 2021-11-13 PROCEDURE — 36415 COLL VENOUS BLD VENIPUNCTURE: CPT | Performed by: PHYSICIAN ASSISTANT

## 2021-11-13 PROCEDURE — 124N000003 HC R&B MH SENIOR/ADOLESCENT

## 2021-11-13 PROCEDURE — 84100 ASSAY OF PHOSPHORUS: CPT | Performed by: PHYSICIAN ASSISTANT

## 2021-11-13 PROCEDURE — 80048 BASIC METABOLIC PNL TOTAL CA: CPT | Performed by: PHYSICIAN ASSISTANT

## 2021-11-13 PROCEDURE — 250N000013 HC RX MED GY IP 250 OP 250 PS 637: Performed by: PHYSICIAN ASSISTANT

## 2021-11-13 PROCEDURE — 83735 ASSAY OF MAGNESIUM: CPT | Performed by: PHYSICIAN ASSISTANT

## 2021-11-13 RX ADMIN — ATORVASTATIN CALCIUM 80 MG: 80 TABLET, FILM COATED ORAL at 20:42

## 2021-11-13 RX ADMIN — Medication 50 MCG: at 09:22

## 2021-11-13 RX ADMIN — ASPIRIN 81 MG CHEWABLE TABLET 81 MG: 81 TABLET CHEWABLE at 09:22

## 2021-11-13 RX ADMIN — LEVOTHYROXINE SODIUM 88 MCG: 88 TABLET ORAL at 09:22

## 2021-11-13 RX ADMIN — SALMETEROL XINAFOATE 1 PUFF: 50 POWDER, METERED ORAL; RESPIRATORY (INHALATION) at 21:15

## 2021-11-13 RX ADMIN — CLOZAPINE 300 MG: 100 TABLET ORAL at 20:41

## 2021-11-13 RX ADMIN — METOPROLOL TARTRATE 25 MG: 25 TABLET, FILM COATED ORAL at 20:41

## 2021-11-13 RX ADMIN — SALMETEROL XINAFOATE 1 PUFF: 50 POWDER, METERED ORAL; RESPIRATORY (INHALATION) at 09:29

## 2021-11-13 RX ADMIN — MEMANTINE 5 MG: 5 TABLET ORAL at 20:42

## 2021-11-13 RX ADMIN — MEMANTINE 5 MG: 5 TABLET ORAL at 09:22

## 2021-11-13 ASSESSMENT — ACTIVITIES OF DAILY LIVING (ADL)
HYGIENE/GROOMING: PROMPTS
HYGIENE/GROOMING: INDEPENDENT;PROMPTS
ORAL_HYGIENE: INDEPENDENT;PROMPTS
ORAL_HYGIENE: PROMPTS
DRESS: INDEPENDENT;PROMPTS

## 2021-11-13 NOTE — PLAN OF CARE
Problem: Behavioral Disturbance  Goal: Behavioral Disturbance  Description: Signs and symptoms of listed problems will be absent or manageable by discharge or transition of care.  Outcome: No Change  Flowsheets  Taken 11/11/2021 2153 by Wen Broderick RN  Behavioral Disturbance Present:    speech    memory deficits    affect    mood    insight    thought process  Taken 8/19/2021 2216 by Laura Mason RN  Behavioral Disturbance Assessed: all   Nursing Assessment    Behavior disturbance [F91.9]    Admit Date: 6/30/2021    Length of Stay: 136    Patient evaluation continues. Assessed mood,anxiety,thoughts and behavior. Patient is not progressing towards goals. Patient is encouraged to participate in groups and assisted to develop healthy coping skills.  Patient denies auditory or visual hallucinations. Pt has refused much of cares offered. Pt gets up independently and uses toilet. Pt states he has 3 homes and wishes to be discharged. Writer explained to pt why he was here and he would not respond.     Mood: sad    Patient reports depression: unable to report  and reports anxiety high because you are keeping me here    Affect:flat,blunted    Sleep: much of the day    Appetite: poor    SI: denies    HI: denies    SIB: denies      Medication Compliance medication compliant    Group participation: no    ADL's: refuses    Fall risk interventions: proper foot wear, monitor for unsteadiness    Rinku Score Interventions: none, turns and repositions per self    Discharge planning none    Refer to daily team meeting notes for individualized plan of care. Nursing will continue to assess.    *Scale is 1-10 and 10 is the worst.

## 2021-11-13 NOTE — PLAN OF CARE
"  Problem: Behavioral Disturbance  Goal: Behavioral Disturbance  Description: Signs and symptoms of listed problems will be absent or manageable by discharge or transition of care.  Outcome: No Change       Patient continuous to refuse care and food. Patient spent all of the shift in his room, resting in bed. When approached by the writer and encouraged activity or nutrition patient became irritated and yelled \"Get out, I am tired, I want to sleep\".   Patient did not ate dinner, drank 200ml of Ensure 100ml of milk and 360ml of water. Patient had low urinary output, urine concentrated. BP has been soft. Discussed with IM, advise to continue encourage oral fluid intake, labs in the morning.   Patient refused COVID -19 swab. Compliant with medications.   Patient denies SI/SIB and hallucinations. Denies anxiety and depression.   "

## 2021-11-14 PROCEDURE — 124N000003 HC R&B MH SENIOR/ADOLESCENT

## 2021-11-14 PROCEDURE — 250N000013 HC RX MED GY IP 250 OP 250 PS 637: Performed by: PSYCHIATRY & NEUROLOGY

## 2021-11-14 RX ADMIN — LEVOTHYROXINE SODIUM 88 MCG: 88 TABLET ORAL at 09:00

## 2021-11-14 RX ADMIN — ASPIRIN 81 MG CHEWABLE TABLET 81 MG: 81 TABLET CHEWABLE at 09:00

## 2021-11-14 RX ADMIN — Medication 50 MCG: at 09:00

## 2021-11-14 RX ADMIN — SALMETEROL XINAFOATE 1 PUFF: 50 POWDER, METERED ORAL; RESPIRATORY (INHALATION) at 09:02

## 2021-11-14 RX ADMIN — MEMANTINE 5 MG: 5 TABLET ORAL at 19:50

## 2021-11-14 RX ADMIN — CLOZAPINE 300 MG: 100 TABLET ORAL at 19:50

## 2021-11-14 RX ADMIN — ATORVASTATIN CALCIUM 80 MG: 80 TABLET, FILM COATED ORAL at 19:50

## 2021-11-14 RX ADMIN — MEMANTINE 5 MG: 5 TABLET ORAL at 09:00

## 2021-11-14 ASSESSMENT — ACTIVITIES OF DAILY LIVING (ADL)
DRESS: SCRUBS (BEHAVIORAL HEALTH);INDEPENDENT
HYGIENE/GROOMING: WITH ASSISTANCE
HYGIENE/GROOMING: INDEPENDENT
ORAL_HYGIENE: INDEPENDENT
LAUNDRY: UNABLE TO COMPLETE
ORAL_HYGIENE: WITH ASSISTANCE
DRESS: WITH ASSISTANCE

## 2021-11-14 NOTE — PLAN OF CARE
Problem: Malnutrition  Goal: Improved Nutritional Intake  Outcome: No Change      Problem: Behavioral Disturbance  Goal: Behavioral Disturbance  Description: Signs and symptoms of listed problems will be absent or manageable by discharge or transition of care.  Outcome: No Change    Assessment/ Observations:    Mood/ Affect: blunted, depressed, withdrawn, poor eye contact    Thought Process: poor concentration   Speech: garbled, difficult to understand, yes/ no answers   Cognition: poor,    Appearance: untidy, disheveled, unshaven, greasy hair- refuses to allow staff to help clean him up.     Safety Screening:  Denies SI.   Precautions:    Assault- No assaultive bx,    Elopement- does not leave his room.    Cheecking- cooperative with checks.    Fall- urinal at bedside, tap bell, have not observed pt walking today.     Intervention:  Medication compliant, except for inhaler  does not request PRNs    Medical: Tongue has white film- he was treated for thrush on 10/29. Paged Justin Martinez, on call. No response. Page HO- no response   Refuses Oral cares   Refuses incentive spirometry  Refused to get weight      Mobility: able to rotate himself in bed, shifts side to side frequently. monitor to ensure his is not lying in same position for more than 2 hours.   Refuses to come out of his room.     Skin assessment: observed not marks on buttocks, heels, elbows, knees, ears, or shoulders.   Armbands switched sides.     Bowels: reports having bowel movements, none this shift.  Urinal by bedside.     Medical bed: bed mobility in failure to thrive pt.     I & O intake remains poor, labs WNL, VS    Pt refuses to eat the solid food on his supper tray.   Tray set up.   Enjoys ice water.   Drank ensure after given encouragement.      Intake- ensure 240 ml, refused solid foods.    Total PO- 720 ml   Output- 0- empty urinal all shift

## 2021-11-14 NOTE — PLAN OF CARE
Patient able to repositioned independently while lying in bed.  Encouraged to use the restroom overnight however declined and became irritated.  Encouraged to push fluids overnight however patient declined.  Patient slept for 11 hrs. Continent of urine this AM.

## 2021-11-14 NOTE — PLAN OF CARE
"Shift update: John remained in bed all throughout the evening shift. He has not voided, so encouraged him to drink fluids. He drank maybe 400ml total for evening shift which included Ensure, lemonade and ice water.   He was very irritableguanako. Said \"I'm tired, leave me alone\" many times.   He self repositioned from side to side throughout the shift.   He severely lacks personal hygiene, but refused to get out of bed.   No issues taking scheduled meds.     Mood/Affect: irritable, guanako    SI: none   SIB: none     Hallucinations/Psychosis:     Activity/Participation: remained in room in bed all shift  PRN Meds: none   Appetite: refused to eat. Drank 400ml with encouragement.     Medical: monitor skin and urine output - 0 urine output for evening shift.     Plan:        "

## 2021-11-14 NOTE — PLAN OF CARE
"  Problem: Malnutrition  Goal: Improved Nutritional Intake  Outcome: No Change   Nursing Assessment    Behavior disturbance [F91.9]    Admit Date: 6/30/2021    Length of Stay: 137    Patient evaluation continues. Assessed mood,anxiety,thoughts and behavior. Patient is not progressing towards goals. Patient is encouraged to participate in groups and assisted to develop healthy coping skills.  Patient denies auditory or visual hallucinations.     Mood: poor,sad,depressed    Patient reports depression  as \"how would you feel\" being locked up at [facility]\"     Sleep: sleeps much of the day and night    Appetite: poor drinks supplements only drank 4 supplements this shift.     SI: denies    HI: denies    SIB: denies      Medication Compliance medication compliant. Pt was monitored for cheeking per orders and protocal    Group participation: no    ADL's: refuses    Fall risk interventions: proper foot wear, increased observation, education    Rinku Score Interventions:none    Discharge planning none  Denies pain or discomfort    Refer to daily team meeting notes for individualized plan of care. Nursing will continue to assess.    *Scale is 1-10 and 10 is the worst.         "

## 2021-11-15 LAB
ANION GAP SERPL CALCULATED.3IONS-SCNC: 7 MMOL/L (ref 3–14)
BUN SERPL-MCNC: 22 MG/DL (ref 7–30)
CALCIUM SERPL-MCNC: 9.7 MG/DL (ref 8.5–10.1)
CHLORIDE BLD-SCNC: 109 MMOL/L (ref 94–109)
CO2 SERPL-SCNC: 21 MMOL/L (ref 20–32)
CREAT SERPL-MCNC: 0.86 MG/DL (ref 0.66–1.25)
GFR SERPL CREATININE-BSD FRML MDRD: >90 ML/MIN/1.73M2
GLUCOSE BLD-MCNC: 109 MG/DL (ref 70–99)
MAGNESIUM SERPL-MCNC: 2.3 MG/DL (ref 1.6–2.3)
PHOSPHATE SERPL-MCNC: 4 MG/DL (ref 2.5–4.5)
POTASSIUM BLD-SCNC: 4.2 MMOL/L (ref 3.4–5.3)
SODIUM SERPL-SCNC: 137 MMOL/L (ref 133–144)

## 2021-11-15 PROCEDURE — 124N000003 HC R&B MH SENIOR/ADOLESCENT

## 2021-11-15 PROCEDURE — 250N000013 HC RX MED GY IP 250 OP 250 PS 637: Performed by: PHYSICIAN ASSISTANT

## 2021-11-15 PROCEDURE — 250N000013 HC RX MED GY IP 250 OP 250 PS 637: Performed by: PSYCHIATRY & NEUROLOGY

## 2021-11-15 PROCEDURE — 82310 ASSAY OF CALCIUM: CPT | Performed by: PHYSICIAN ASSISTANT

## 2021-11-15 PROCEDURE — 36416 COLLJ CAPILLARY BLOOD SPEC: CPT | Performed by: PHYSICIAN ASSISTANT

## 2021-11-15 PROCEDURE — 83735 ASSAY OF MAGNESIUM: CPT | Performed by: PHYSICIAN ASSISTANT

## 2021-11-15 PROCEDURE — 84100 ASSAY OF PHOSPHORUS: CPT | Performed by: PHYSICIAN ASSISTANT

## 2021-11-15 PROCEDURE — 99233 SBSQ HOSP IP/OBS HIGH 50: CPT | Performed by: PSYCHIATRY & NEUROLOGY

## 2021-11-15 RX ORDER — NYSTATIN 100000/ML
500000 SUSPENSION, ORAL (FINAL DOSE FORM) ORAL 4 TIMES DAILY
Status: DISPENSED | OUTPATIENT
Start: 2021-11-15 | End: 2021-11-25

## 2021-11-15 RX ADMIN — CLOZAPINE 300 MG: 100 TABLET ORAL at 19:43

## 2021-11-15 RX ADMIN — Medication 50 MCG: at 09:09

## 2021-11-15 RX ADMIN — NYSTATIN 500000 UNITS: 100000 SUSPENSION ORAL at 13:05

## 2021-11-15 RX ADMIN — ASPIRIN 81 MG CHEWABLE TABLET 81 MG: 81 TABLET CHEWABLE at 09:09

## 2021-11-15 RX ADMIN — LEVOTHYROXINE SODIUM 88 MCG: 88 TABLET ORAL at 09:09

## 2021-11-15 RX ADMIN — MELATONIN TAB 3 MG 3 MG: 3 TAB at 19:44

## 2021-11-15 RX ADMIN — METOPROLOL TARTRATE 25 MG: 25 TABLET, FILM COATED ORAL at 09:12

## 2021-11-15 RX ADMIN — MEMANTINE 5 MG: 5 TABLET ORAL at 19:44

## 2021-11-15 RX ADMIN — ATORVASTATIN CALCIUM 80 MG: 80 TABLET, FILM COATED ORAL at 19:43

## 2021-11-15 RX ADMIN — MEMANTINE 5 MG: 5 TABLET ORAL at 09:09

## 2021-11-15 RX ADMIN — NYSTATIN 500000 UNITS: 100000 SUSPENSION ORAL at 16:24

## 2021-11-15 RX ADMIN — NYSTATIN 500000 UNITS: 100000 SUSPENSION ORAL at 19:44

## 2021-11-15 ASSESSMENT — ACTIVITIES OF DAILY LIVING (ADL)
DRESS: WITH ASSISTANCE
ORAL_HYGIENE: WITH ASSISTANCE
LAUNDRY: UNABLE TO COMPLETE
DRESS: WITH ASSISTANCE
ORAL_HYGIENE: WITH ASSISTANCE
HYGIENE/GROOMING: WITH ASSISTANCE
LAUNDRY: UNABLE TO COMPLETE
HYGIENE/GROOMING: WITH ASSISTANCE

## 2021-11-15 NOTE — PLAN OF CARE
Patient has remained in his room sleeping throughout the night.  Encouraged to use the restroom q 2 hrs however refuses.  Remains mute staff engages in communication.  No episodes of incontinence.  Patient slept for 11.5 hrs

## 2021-11-15 NOTE — PLAN OF CARE
Assessment/Intervention/Current Symtoms and Care Coordination  Pt was transferred from [facility] to this unit for continuation of care.  Team met and rounded on this patient. His care plan was reviewed. Met with pt, although he was barely responsive to the questions posed. At this time, stabilization is ongoing. Pt is being motivated to eat food and engage in activities.     Contacted pt's mental health  (Kurtis Chapin) and requested to have a conversation concerning the next steps for this patient, in terms of care planning and after-care.      Discharge Plan or Goal  Considerations include: medical unit     Barriers to Discharge  Patient does not have a guardian. Efforts to get a guardian have lizandro unsuccessful.     Referral Status  None  Options:  Possible secured SNF for P[facility]. Consider [facility] in [facility] for male memory care opening. The intake person is Karina and referrals can be faxed to: [phone], her phone is [phone].    Case Management Services:  [facility]   Kurtis Chapin. Phone: [phone]  Supervisor is [email or web address]     Legal Status  Patient is under MI commitment in [facility]

## 2021-11-15 NOTE — PROGRESS NOTES
"Brief Medicine Note    Medicine following oral intake closely. Patient consumed ~700 calories on 11/12 and 11/13. He did have increased oral intake yesterday on 11/14 at 1525 kcals. Repeat labs today are stable: BMP, mag, phos are all within normal limits. Blood pressure soft at times. Discussed with RN, patient continues to remain isolated in his room. He is compliant with all medications except for his inhaler. He is otherwise non participatory with cares. Discussed with Dr. Morfin of Psychiatry. Patient has not made meaningful psychiatric progress since transitioning to station [facility] and it is likely that his current state represents his new baseline. Guardianship is pending.     I met with the patient this afternoon to discuss NG tube placement. He was found laying in bed, in no apparent distress but appeared to be responding to internal stimuli, mumbling nonsensical words under his breath. Oriented to place of \"institution\" and self. Not oriented to day, month, year, or current president.     When asked how he was feeling he responded with \"get the fuck out of my room.\" He told me his eating habits were \"fine\" and was not able to tell me any details of his food or liquid intake. He denied having any pain or nausea. He became agitated when I stepped within approximately 2 feet of his bed. As I got closer to him he started to thrash his arms around and told me to \"fuck off.\" He demanded a cigarette. I explained the indications for tube feedings, NG tube placement, and the procedural details of NG placement. I asked him if he would be willing to have an NG tube placed and he again told me to \"fuck off.\" I did not feel that I could safely continue discussing NG placement and I left the room.     Plan:   - Hold on NG-tube placement at this time as patient is not agreeable and I am concerned that placement would require 4-point restraints. Patient would also be high risk for self-removing NG tube.   - Will discuss with " Ethics team tomorrow   - Continue to encourage oral intake    Medicine will continue to follow.     Francine Couch PA-C  Hospitalist Service  Pager: [phone]

## 2021-11-15 NOTE — PLAN OF CARE
Problem: Behavioral Health Plan of Care  Goal: Plan of Care Review  Outcome: No Change  Flowsheets (Taken 11/15/2021 1200)  Plan of Care Reviewed With: patient  Patient Agreement with Plan of Care: refuses to participate     Problem: Psychotic Symptoms  Goal: Psychotic Symptoms  Description: Signs and symptoms of listed problems will be absent or manageable.  Outcome: No Change     Problem: Cognitive Impairment (Psychotic Signs/Symptoms)  Goal: Optimal Cognitive Function (Psychotic Signs/Symptoms)  Outcome: No Change  Intervention: Support and Promote Cognitive Ability  Recent Flowsheet Documentation  Taken 11/15/2021 1200 by Juarez Murphy, RN  Trust Relationship/Rapport: care explained     Problem: Malnutrition  Goal: Improved Nutritional Intake  Outcome: Declining     Patient irritable and isolative to room. Denies mental health illness. Denies SI/SIB. Poor insight and judgement. Patient is alert to self only. Medication compliant except for inhaler. Patient speech garbled and difficult to understand. Did not get out bed this shift, voided x1 in urinal and incontinent in pad requiring linen change and bed bath. Patient declined shower when offered. Patient had about 320cc (including 1 ensure) intake and 300cc out measured and x1 urinary incontinence.  Patient did not eat any of his food from breakfast for lunch. Nystatin solution added to combat oral thrush, patient received first dose this shift. IM came to unit to assess patient for TF, patient declined. Will continue with plan of care.    Dietician will order 3 Cans of TwoCal HN and 2 ensure daily. Nursing will be able to order more TwoCal as needed if patient is drinking them well. Please keep TwoCalHN refrigerator.

## 2021-11-15 NOTE — PROGRESS NOTES
CLINICAL NUTRITION SERVICES - REASSESSMENT NOTE     Nutrition Prescription    RECOMMENDATIONS FOR MDs/PROVIDERS TO ORDER:  Continue to recommend initiation of nutrition support if appropriate based on goals of care as average intake continues to meet <50% of estimated needs     Malnutrition Status:    Severe malnutrition in the context of acute illness- previously noted, remains current    Recommendations already ordered by Registered Dietitian (RD):  Medical food supplement therapy--adjust supplements- TwoCal HN 3x/day with meals- 475 kcal/20 g protein per container), Ensure Enlive - bid between meals (350 kcal/20 g protein per container). If all supplements are consumed, patient will meet needs    Future/Additional Recommendations:  Monitor goals of care, potential to initiate nutrition support, acceptance of supplements     EVALUATION OF THE PROGRESS TOWARD GOALS   Diet: Mechanical/Dental Soft   Supplements: Ensure Enlive tid with meals, Magic Cup bid with meals, TwoCal HN daily between meals  Intake: highly variable, today patient has refused most intake, has taken some Ensure Enlive    11/14: 1525 kcal/80 g protein (pt consumed 4 supplements- 3 Ensure Enlive and 1 TwoCal HN)  11/13: 712 kcal/29 g protein  11/12: 750 kcal/45 g protein     NEW FINDINGS   Spoke with RN today. RN reports patient taking supplements inconsistently--at times he takes them well and at other times he doesn't. He is currently being treated for oral thrush, which is likely contributing to his poor intake.    Weight: 89.3 kg (9/28)-->79.8 kg (11/1)- 12.6% weight loss over 6 weeks    Labs: Reviewed    GI: No concerns noted    Skin: No concerns at this time    MALNUTRITION  % Intake: </= 50% for >/= 5 days (severe)  % Weight Loss: > 5% in 1 month (severe)  Subcutaneous Fat Loss: Unable to assess  Muscle Loss: Unable to assess  Fluid Accumulation/Edema: None noted  Malnutrition Diagnosis: Severe malnutrition in the context of acute  illness     Previous Goals   Patient to consume % of nutritionally adequate meal trays TID, or the equivalent with supplements/snacks.  Evaluation: Not met    Previous Nutrition Diagnosis  Inadequate oral intake of unclear etiology (failure to thrive, menu fatigue) as evidenced by poor intake and severe weight loss  Evaluation: No change    CURRENT NUTRITION DIAGNOSIS  Inadequate oral intake of unclear etiology (failure to thrive, menu fatigue) as evidenced by poor intake and severe weight loss    INTERVENTIONS  Implementation  Medical food supplement therapy--adjust supplements- TwoCal HN 3x/day with meals- 475 kcal/20 g protein per container), Ensure Enlive - bid between meals (350 kcal/20 g protein per container). If all supplements are consumed, patient will meet needs    Goals  Patient to consume % of nutritionally adequate meal trays TID, or the equivalent with supplements/snacks.    Monitoring/Evaluation  Progress toward goals will be monitored and evaluated per protocol.    Najma Suarez RD, LD  ICU/5A/OB/Mental Health Pager (M-F): [phone]  On Call Pager (weekends only): [phone]

## 2021-11-15 NOTE — PROGRESS NOTES
"[facility], [place]   Psychiatric Progress Note        Interim History:   The patient's care was discussed with the treatment team during the daily team meeting and/or staff's chart notes were reviewed.  Staff report patient has been irritable if asked to come out to the lounge. Did have some \"sips\" of Ensure this morning but not eating well. May have thrush.     The patient states that he is \"all right.\" Says that he is sleeping well. Denies SI. Discussed his lack of eating and possibility of NG tube and patient says, \"I can eat.\" Does not want to come to the lounge but says that he will eat more of his breakfast later this morning.          Medications:       aspirin  81 mg Oral Daily     atorvastatin  80 mg Oral At Bedtime     cholecalciferol  1,250 mcg Oral Q7 Days     cloZAPine  300 mg Oral At Bedtime     levothyroxine  88 mcg Oral Daily     memantine  5 mg Oral BID     metoprolol tartrate  25 mg Oral BID     nystatin  500,000 Units Swish & Swallow 4x Daily     salmeterol  1 puff Inhalation BID     vitamin D3  50 mcg Oral Daily          Allergies:     Allergies   Allergen Reactions     Ibuprofen      Latex           Labs:     Recent Results (from the past 24 hour(s))   Basic metabolic panel    Collection Time: 11/15/21  8:57 AM   Result Value Ref Range    Sodium 137 133 - 144 mmol/L    Potassium 4.2 3.4 - 5.3 mmol/L    Chloride 109 94 - 109 mmol/L    Carbon Dioxide (CO2) 21 20 - 32 mmol/L    Anion Gap 7 3 - 14 mmol/L    Urea Nitrogen 22 7 - 30 mg/dL    Creatinine 0.86 0.66 - 1.25 mg/dL    Calcium 9.7 8.5 - 10.1 mg/dL    Glucose 109 (H) 70 - 99 mg/dL    GFR Estimate >90 >60 mL/min/1.73m2   Magnesium    Collection Time: 11/15/21  8:57 AM   Result Value Ref Range    Magnesium 2.3 1.6 - 2.3 mg/dL   Phosphorus    Collection Time: 11/15/21  8:57 AM   Result Value Ref Range    Phosphorus 4.0 2.5 - 4.5 mg/dL          Psychiatric Examination:     /82   Pulse 86   Temp 98.6  F (37  C) " "(Temporal)   Resp 16   Ht 1.829 m (6')   Wt 79.8 kg (176 lb)   SpO2 98%   BMI 23.87 kg/m    Weight is 176 lbs 0 oz  Body mass index is 23.87 kg/m .  Orthostatic Vitals  Report      Most Recent      Lying Orthostatic BP 75/58 11/12 0850    Lying Orthostatic Pulse (bpm) 84 11/12 0850            Appearance: awake, alert  Attitude:  somewhat cooperative  Eye Contact:  fair  Mood:  \"all right\"  Affect:  intensity is flat  Speech:  paucity of speech  Psychomotor Behavior:  no evidence of tardive dyskinesia, dystonia, or tics  Thought Process:  illogical  Associations:  no loose associations  Thought Content:  no evidence of suicidal ideation or homicidal ideation and obsessions present  Insight:  limited  Judgement:  limited  Oriented to:  person and place  Attention Span and Concentration:  fair  Recent and Remote Memory:  poor    Clinical Global Impressions  First:  Considering your total clinical experience with this particular patient population, how severe are the patient's symptoms at this time?: 7 (07/01/21 0630)  Compared to the patient's condition at the START of treatment, this patient's condition is: 4 (07/01/21 0630)  Most recent:  Considering your total clinical experience with this particular patient population, how severe are the patient's symptoms at this time?: 7 (11/09/21 1642)  Compared to the patient's condition at the START of treatment, this patient's condition is: 5 (11/09/21 1642)         Precautions:     Behavioral Orders   Procedures     Assault precautions     Cheeking Precautions (behavioral units)     Patient Observed swallowing PO medications; Patient asked to drink water after swallowing medication; Patient in Staff line of sight for 15 minutes after medication given; Mouth checks after PO administration (patient asked to open mouth and stick out their tongue).     Code 1     Code 2     With Security for any imaging/testing needed.     Elopement precautions     Fall precautions     " Routine Programming     As clinically indicated     Single Room     Status 15     Every 15 minutes.          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   Stimulant use disorder, in remission.   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 Is currently under commitment with Yanez recently amended to include Clozaril. While at [facility] was noted to have ongoing agitation and frequently attempting to elope from unit requiring 1:1 staffing for safety. He was started on 1:1 staff upon transfer, this was discontinued as patient has been more cooperative without elopement attempts. IM consult completed on admission and continued to follow due to elevated LFTs. Haldol and VPA discontinued due to LFTs. Cross titration completed from risperidone to clozaril after Yanez amended. EPSE/TD movements noted, have appeared stable over past few weeks. Patient has continued to have disorganization and active psychosis symptoms which appear to be at patients baseline. Team continues to work on disposition which barriers include patient has no guardian or next of kin willing to act as surrogate decision maker, see CTC notes for complete details. Patient starting to have increasing  symptoms noted week of 9/20, clozapine level ordered and noted to be lower than previous, have added cheeking precautions and increased clozapine dose on 9/24 and moved to split dosing given increasing afternoon agitation. Pt having decreased appetite, increased fatigue over weekend of 10/9-10/10, IM consult placed 10/11, KUB Xray showed large stool burden, bowel regimen modified. Pt was incontinent of stool evening of 10/13. Consolidated clozapine dose to bedtime given concern for daytime sedation with split dosing. Ongoing malaise noted starting 10/11, internal medicine has been contacted periodically. Noted 18# weight loss in 2 months, IM contacted for possible FTT on 11/4. Ethics consult placed 11/5. Neurology re-consulted 11/8.      Psychiatric treatment/inteventions:  Medications:   -continue clozapine 300mg at bedtime, reduced over the past few weeks due to ongoing sedation/fatigue and prolonged QTc; continue to monitor movements consistent with TD  -continue cheeking precautions, started 9/27 given noted decompensation in recent weeks and lower clozapine level   -continue memantine at 5mg daily for neurocognitive disorder, dose was up to 10mg BID and have been reducing dose, may taper off as patient does not appear to have benefit and reporting oversedation and to reduce anticholinergic load  -continue risperdal 1mg Q6H PRN agitation   -continue lorazepam 0.5-1.0mg Q4H PRN anxiety or severe agitation     -discontinued benztropine 0.25mg BID on 10/21, tapered off to reduce anticholinergic load  -discontinued rivastigmine patch on 10/21,pt did not seemed to have benefit and to reduce anticholinergic load     -Ethics consult placed 11/5 due to patient lacking decision making capacity without guardian or surrogate decision maker and possible need for medical interventions including NJ/NG or PEG tube in future, appreciate assistance, care conference on 11/11    Discussed plan for NG tube with medicine and they  are following this closely.     Disposition Plan   Reason for ongoing admission: is unable to care for self due to severe psychosis or mariusz  Discharge location: group home  Discharge Medications: not ordered  Follow-up Appointments: not scheduled  Legal Status: full commitment    Entered by: Wilton Morfin on November 15, 2021 at 12:18 PM

## 2021-11-16 PROCEDURE — 250N000013 HC RX MED GY IP 250 OP 250 PS 637: Performed by: PSYCHIATRY & NEUROLOGY

## 2021-11-16 PROCEDURE — 250N000013 HC RX MED GY IP 250 OP 250 PS 637: Performed by: PHYSICIAN ASSISTANT

## 2021-11-16 PROCEDURE — 124N000003 HC R&B MH SENIOR/ADOLESCENT

## 2021-11-16 RX ORDER — MEGESTROL ACETATE 20 MG/1
20 TABLET ORAL DAILY
Status: DISCONTINUED | OUTPATIENT
Start: 2021-11-16 | End: 2022-01-18 | Stop reason: HOSPADM

## 2021-11-16 RX ORDER — MEMANTINE HYDROCHLORIDE 10 MG/1
10 TABLET ORAL 2 TIMES DAILY
Status: DISCONTINUED | OUTPATIENT
Start: 2021-11-16 | End: 2022-01-18 | Stop reason: HOSPADM

## 2021-11-16 RX ORDER — MIRTAZAPINE 7.5 MG/1
7.5 TABLET, FILM COATED ORAL AT BEDTIME
Status: DISCONTINUED | OUTPATIENT
Start: 2021-11-16 | End: 2021-11-17

## 2021-11-16 RX ORDER — BUSPIRONE HYDROCHLORIDE 5 MG/1
5 TABLET ORAL 3 TIMES DAILY
Status: DISCONTINUED | OUTPATIENT
Start: 2021-11-16 | End: 2021-11-18

## 2021-11-16 RX ADMIN — NYSTATIN 500000 UNITS: 100000 SUSPENSION ORAL at 08:40

## 2021-11-16 RX ADMIN — LEVOTHYROXINE SODIUM 88 MCG: 88 TABLET ORAL at 08:40

## 2021-11-16 RX ADMIN — MEMANTINE 5 MG: 5 TABLET ORAL at 10:11

## 2021-11-16 RX ADMIN — Medication 50 MCG: at 08:40

## 2021-11-16 RX ADMIN — ATORVASTATIN CALCIUM 80 MG: 80 TABLET, FILM COATED ORAL at 19:28

## 2021-11-16 RX ADMIN — CLOZAPINE 300 MG: 100 TABLET ORAL at 19:28

## 2021-11-16 RX ADMIN — SALMETEROL XINAFOATE 1 PUFF: 50 POWDER, METERED ORAL; RESPIRATORY (INHALATION) at 19:32

## 2021-11-16 RX ADMIN — MEGESTROL ACETATE 20 MG: 20 TABLET ORAL at 19:28

## 2021-11-16 RX ADMIN — BUSPIRONE HYDROCHLORIDE 5 MG: 5 TABLET ORAL at 19:28

## 2021-11-16 RX ADMIN — NYSTATIN 500000 UNITS: 100000 SUSPENSION ORAL at 19:28

## 2021-11-16 RX ADMIN — SALMETEROL XINAFOATE 1 PUFF: 50 POWDER, METERED ORAL; RESPIRATORY (INHALATION) at 08:43

## 2021-11-16 RX ADMIN — NYSTATIN 500000 UNITS: 100000 SUSPENSION ORAL at 11:58

## 2021-11-16 RX ADMIN — ASPIRIN 81 MG CHEWABLE TABLET 81 MG: 81 TABLET CHEWABLE at 08:40

## 2021-11-16 RX ADMIN — MIRTAZAPINE 7.5 MG: 7.5 TABLET, FILM COATED ORAL at 21:15

## 2021-11-16 RX ADMIN — MEMANTINE 10 MG: 10 TABLET ORAL at 19:28

## 2021-11-16 ASSESSMENT — ACTIVITIES OF DAILY LIVING (ADL)
HYGIENE/GROOMING: WITH ASSISTANCE
LAUNDRY: UNABLE TO COMPLETE
DRESS: WITH ASSISTANCE
ORAL_HYGIENE: WITH ASSISTANCE
DRESS: WITH ASSISTANCE
LAUNDRY: UNABLE TO COMPLETE
ORAL_HYGIENE: WITH ASSISTANCE
HYGIENE/GROOMING: WITH ASSISTANCE

## 2021-11-16 ASSESSMENT — MIFFLIN-ST. JEOR: SCORE: 1651

## 2021-11-16 NOTE — PLAN OF CARE
Problem: Behavioral Disturbance  Goal: Behavioral Disturbance  Description: Signs and symptoms of listed problems will be absent or manageable by discharge or transition of care.  Outcome: No Change     Patient alert, oriented to self, confused about time, day, place and situation. Affect is blunted/flat. Mood is mostly calm but gets irritable when staff encourages activities out of bed or food. Shouts at staff  get out of here  and  leave me alone, I am tired  when frustrated.  Patient does not threaten with violence and does not show signs of physical aggression. Patient compliant with medications. Patient is disheveled, with neglected grooming and body odor. Shower was offered many times during the shift, but patient refused. At bedtime patient agree on Shampoo cap, washed his face, rinsed his mouth with mouth wash, washed groin with soap and water and put new pull up.   Patient is isolative and withdrawn. Spent all shift resting in bed. He reported feeling  tired  and unable to sleep. He stated that he has been lying in bed but unable fall asleep even during the night. PRN Melatonin given at bedtime.   Patient appetite is very poor had few spoons of pudding. Oral fluids encouraged, mostly calory containing liquids. Patient had one chocolate milk and 2 juices, total fluid intake of 510ml.   Denies SI/SIB and hallucinations.   Metoprolol held for soft BP.   Skin inspected: coccyx clear, feet dry. No skin breakdown noted.

## 2021-11-16 NOTE — PROGRESS NOTES
Previously the ethics consult service recommended an attempt to provide artifical hydration and nutrition, but with the caveat that significant intrusions on patient autonomy and the use of restraint or coercion should not be used.     Today, Francine Couch advised me that it is unlikely that an NG tube can be placed without the use of restraints.     Following discussion with members of the ethics consult service, we agree that escalation of care that requires restraint or coercion would be ethically problematic.     Here, there is some tension between obligations to affirm patient autonomy, and obligations to prevent harm and to provide the benefits of medical intervention, and for a patient that has significant impairments in decision making capacity but also retains some control over his own body and preferences.     In the absence of reliable information about the patient's values and preferences, and in the absence of an appropriate surrogate decision maker, a best interest standard applies. In this case, it appears that the risks and burdens of restraint for artificial hydration and nutrition outweigh the potential benefits. Ongoing and indefinite restraint for an involuntary treatment is a significant intrusion of autonomy- even though the patient is not per se decisional, he still has interests in bodily integrity and control over how his body is treated. Proceeding with restraint and involuntary treatment would be harmful. The prospect of benefit is also limited, in that NOAH doesn't directly address the underlying medical and psychiatric issues.     We recommend a transition to a plan of care that emphasizes symptom management.           Wilton Mata JD, MPH, MA, LEE-C                                   Clinical Ethics Lead, [employer]     Please contact the service if we can be of any further assistance, service pager [phone].

## 2021-11-16 NOTE — PLAN OF CARE
Problem: Behavioral Health Plan of Care  Goal: Plan of Care Review  Outcome: No Change  Flowsheets (Taken 11/16/2021 1100)  Plan of Care Reviewed With: patient  Patient Agreement with Plan of Care: refuses to participate     Problem: Psychotic Symptoms  Goal: Psychotic Symptoms  Description: Signs and symptoms of listed problems will be absent or manageable.  Outcome: No Change     Problem: Malnutrition  Goal: Improved Nutritional Intake  Outcome: Declining     Patient Alert to self, affect flat bunted but can become labile. Poor insight and poor judgement. Writer and OT encouraged patient to leave room to attend groups or have meals in the milieu, patient declined and became irritable when pushed. Patient reports social anxiety contributes to his isolation, but when asked about it again this afternoon patient denies social anxiety. Patient often pushes requests back, stating he will complete requested tasks later but when is asked about it again patient reports i'm tired. Denies SI,SIB,HI, and depression. Patient asked writer why is my commitment so long. Writer encourages patient to participate in plan of care to expedite his discharge. Patient became increasingly irritable and started to yell at writer but was able to calm himself.   Patient blood pressure soft this shift metoprolol held, and provider placed hold on future doses. Currently /67.  Patient had poor oral intake today, drank 370ml with 100mls being TwoCalHN supplement.  Voided 300ml tea colored urine.     IM notified staff that ethics consultation was had about NG tube placement for patient and came to conclusion that patient's autonomy must be maintained and no placement will occur at this time. Please see ethics consultation note for more details.

## 2021-11-16 NOTE — PROGRESS NOTES
Brief Medicine Note    Discussed patient with Ethics team.  As John was not agreable to NG tube placement and I feel would likely require restraints to have this placed, we will not move forward with NG tube placement and tube feed initiation. Please see note by Wilton Mata of Ethics team for more details. We could re-consider NG tube placement if the patient is at some point agreeable to having this done.     Will continue to monitor his intake and twice weekly labs.     Medicine will follow peripherally. Please do not hesitate to contact if new questions or concerns arise.     Francine Couch PA-C  Hospitalist Service  Pager: [phone]

## 2021-11-16 NOTE — PLAN OF CARE
Assessment/Intervention/Current Symtoms and Care Coordination  Pt was transferred from [facility] to this unit for continuation of care.  Chart reviewed. Called pt's  (Kurtis for care coordination). Left him a message for follow-up. He provided brief information related to pt's status related to care coordination but writer is requesting more. Plan is to have another meeting tomorrow.       Discharge Plan or Goal  Considerations include: medical unit     Barriers to Discharge  Patient does not have a guardian. Efforts to get a guardian have lizandro unsuccessful.     Referral Status  None  Options:  Possible secured SNF for P.G. Consider [facility] for male memory care opening. The intake person is Karina and referrals can be faxed to: [phone], her phone is [phone].     Case Management Services:  [facility]   Kurtis Chapin. Phone: [phone]  Supervisor is [email or web address]     Legal Status  Patient is under MI commitment in [facility]

## 2021-11-16 NOTE — PROGRESS NOTES
Patient seen, chart reviewed, care discussed with staff.  Care assumed.  Blood pressure 102/79, pulse 92, temperature 98.3  F (36.8  C), temperature source Temporal, resp. rate 16, height 1.829 m (6'), weight 78.8 kg (173 lb 11.6 oz), SpO2 96 %.    Extensive chart review occurred.    Ethics consult appreciated.    Diagnosis of Schizophrenia, Major neurocognitive disorder due to TBI and alcohol history.  Sober recently.    Currently  Isolates to room  Intake very poor, irritable.  Clozaril level plus metabolite = 1259.  QTC = 510.    General appearance: discheveled  Alert.   Affect: sad, blunted  Mood: fair  Speech:  Dysarthric, production decreased.   Eye contact:  good.    Psychomotor behavior: decreased, curled in bed  Gait: not observed  Abnormal movements: Moderate oral facial TD  Delusions: uncertain, likelt  Hallucinations:  Unknown, likelyThoughts: logical  Associations: intact  Judgement:  Insight:  Cognitions: impaired  Memory:  Impaired  Orientation: not oriented   Not suicidal.    Plan:  Add low dose Remeron for mood, and appetite  2.  Add Megace  3.  Add Buspar for anxiety    Current Facility-Administered Medications   Medication     acetaminophen (TYLENOL) tablet 650 mg     albuterol (PROAIR HFA/PROVENTIL HFA/VENTOLIN HFA) 108 (90 Base) MCG/ACT inhaler 1-2 puff     alum & mag hydroxide-simethicone (MAALOX) suspension 30 mL     aspirin EC tablet 81 mg     atorvastatin (LIPITOR) tablet 80 mg     benzocaine-menthol (CEPACOL) 15-3.6 MG lozenge 1 lozenge     bisacodyl (DULCOLAX) Suppository 10 mg     busPIRone (BUSPAR) tablet 5 mg     cholecalciferol (VITAMIN D3) 1250 mcg (85932 units) capsule 1,250 mcg     cloZAPine (CLOZARIL) tablet 300 mg     levothyroxine (SYNTHROID/LEVOTHROID) tablet 88 mcg     magnesium hydroxide (MILK OF MAGNESIA) suspension 30 mL     megestrol (MEGACE) tablet 20 mg     melatonin tablet 3 mg     memantine (NAMENDA) tablet 10 mg     [Held by provider] metoprolol tartrate (LOPRESSOR)  tablet 25 mg     mirtazapine (REMERON) tablet TABS 7.5 mg     nicotine (COMMIT) lozenge 2 mg     nystatin (MYCOSTATIN) suspension 500,000 Units     polyethylene glycol (MIRALAX) Packet 17 g     polyethylene glycol (MIRALAX) Packet 17 g     polyethylene glycol-propylene glycol PF (SYSTANE ULTRA PF) opthalmic solution 1 drop     risperiDONE (risperDAL M-TABS) ODT tab 1 mg     salmeterol (SEREVENT) diskus inhaler 1 puff     senna-docusate (SENOKOT-S/PERICOLACE) 8.6-50 MG per tablet 2 tablet     Vitamin D3 (CHOLECALCIFEROL) tablet 50 mcg     Recent Results (from the past 168 hour(s))   CBC with platelets and differential    Collection Time: 11/11/21  8:21 AM   Result Value Ref Range    WBC Count 11.6 (H) 4.0 - 11.0 10e3/uL    RBC Count 4.64 4.40 - 5.90 10e6/uL    Hemoglobin 14.2 13.3 - 17.7 g/dL    Hematocrit 42.3 40.0 - 53.0 %    MCV 91 78 - 100 fL    MCH 30.6 26.5 - 33.0 pg    MCHC 33.6 31.5 - 36.5 g/dL    RDW 13.2 10.0 - 15.0 %    Platelet Count 278 150 - 450 10e3/uL    % Neutrophils 68 %    % Lymphocytes 10 %    % Monocytes 10 %    % Eosinophils 10 %    % Basophils 1 %    % Immature Granulocytes 1 %    NRBCs per 100 WBC 0 <1 /100    Absolute Neutrophils 7.9 1.6 - 8.3 10e3/uL    Absolute Lymphocytes 1.2 0.8 - 5.3 10e3/uL    Absolute Monocytes 1.2 0.0 - 1.3 10e3/uL    Absolute Eosinophils 1.1 (H) 0.0 - 0.7 10e3/uL    Absolute Basophils 0.1 0.0 - 0.2 10e3/uL    Absolute Immature Granulocytes 0.1 (H) <=0.0 10e3/uL    Absolute NRBCs 0.0 10e3/uL   Basic metabolic panel    Collection Time: 11/12/21  8:38 AM   Result Value Ref Range    Sodium 138 133 - 144 mmol/L    Potassium 3.7 3.4 - 5.3 mmol/L    Chloride 110 (H) 94 - 109 mmol/L    Carbon Dioxide (CO2) 24 20 - 32 mmol/L    Anion Gap 4 3 - 14 mmol/L    Urea Nitrogen 18 7 - 30 mg/dL    Creatinine 0.71 0.66 - 1.25 mg/dL    Calcium 10.0 8.5 - 10.1 mg/dL    Glucose 118 (H) 70 - 99 mg/dL    GFR Estimate >90 >60 mL/min/1.73m2   Magnesium    Collection Time: 11/12/21  8:38 AM    Result Value Ref Range    Magnesium 2.2 1.6 - 2.3 mg/dL   Phosphorus    Collection Time: 11/12/21  8:38 AM   Result Value Ref Range    Phosphorus 4.1 2.5 - 4.5 mg/dL   Basic metabolic panel    Collection Time: 11/13/21  7:55 AM   Result Value Ref Range    Sodium 138 133 - 144 mmol/L    Potassium 3.9 3.4 - 5.3 mmol/L    Chloride 107 94 - 109 mmol/L    Carbon Dioxide (CO2) 25 20 - 32 mmol/L    Anion Gap 6 3 - 14 mmol/L    Urea Nitrogen 16 7 - 30 mg/dL    Creatinine 0.77 0.66 - 1.25 mg/dL    Calcium 9.5 8.5 - 10.1 mg/dL    Glucose 114 (H) 70 - 99 mg/dL    GFR Estimate >90 >60 mL/min/1.73m2   Phosphorus    Collection Time: 11/13/21  7:55 AM   Result Value Ref Range    Phosphorus 3.8 2.5 - 4.5 mg/dL   Magnesium    Collection Time: 11/13/21  7:55 AM   Result Value Ref Range    Magnesium 2.0 1.6 - 2.3 mg/dL   Basic metabolic panel    Collection Time: 11/15/21  8:57 AM   Result Value Ref Range    Sodium 137 133 - 144 mmol/L    Potassium 4.2 3.4 - 5.3 mmol/L    Chloride 109 94 - 109 mmol/L    Carbon Dioxide (CO2) 21 20 - 32 mmol/L    Anion Gap 7 3 - 14 mmol/L    Urea Nitrogen 22 7 - 30 mg/dL    Creatinine 0.86 0.66 - 1.25 mg/dL    Calcium 9.7 8.5 - 10.1 mg/dL    Glucose 109 (H) 70 - 99 mg/dL    GFR Estimate >90 >60 mL/min/1.73m2   Magnesium    Collection Time: 11/15/21  8:57 AM   Result Value Ref Range    Magnesium 2.3 1.6 - 2.3 mg/dL   Phosphorus    Collection Time: 11/15/21  8:57 AM   Result Value Ref Range    Phosphorus 4.0 2.5 - 4.5 mg/dL

## 2021-11-16 NOTE — PROGRESS NOTES
Pt appeared to be sleeping a total of about 9.5 hours. Writer offered to change pt but he declined. No other concerns during this shift. Will continue to monitor.

## 2021-11-16 NOTE — PROGRESS NOTES
CLINICAL NUTRITION SERVICES - BRIEF NOTE    Noted calorie counts ordered for patient. RD currently following patient closely due to prolonged very poor PO intake. Pt is unwilling to consider NG tube at this point. Ethics supports management of symptoms rather than an involuntary treatment that may require restraints. Spoke with Provider regarding ongoing poor PO and continuing recommendation for nutrition support (IF appropriate based on patient's goals of care, which does not include NG tube at this time) as pt's average intake remains well below 50% of estimated needs. Discussed that calorie counts won't change the recommendation--will discontinue. RD will continue to follow closely--currently checking chart every 2-3 days.     Najma Suarez RD, LD  ICU/5A/OB/Mental Health Pager (M-F): [phone]  On Call Pager (weekends only): [phone]

## 2021-11-17 PROCEDURE — 124N000003 HC R&B MH SENIOR/ADOLESCENT

## 2021-11-17 PROCEDURE — 250N000013 HC RX MED GY IP 250 OP 250 PS 637: Performed by: PSYCHIATRY & NEUROLOGY

## 2021-11-17 PROCEDURE — 250N000013 HC RX MED GY IP 250 OP 250 PS 637: Performed by: PHYSICIAN ASSISTANT

## 2021-11-17 RX ORDER — HALOPERIDOL 1 MG/1
1 TABLET ORAL 2 TIMES DAILY
Status: DISCONTINUED | OUTPATIENT
Start: 2021-11-17 | End: 2021-11-24

## 2021-11-17 RX ORDER — MIRTAZAPINE 7.5 MG/1
15 TABLET, FILM COATED ORAL AT BEDTIME
Status: DISCONTINUED | OUTPATIENT
Start: 2021-11-17 | End: 2022-01-18 | Stop reason: HOSPADM

## 2021-11-17 RX ADMIN — BUSPIRONE HYDROCHLORIDE 5 MG: 5 TABLET ORAL at 08:55

## 2021-11-17 RX ADMIN — MEMANTINE 10 MG: 10 TABLET ORAL at 20:30

## 2021-11-17 RX ADMIN — MEMANTINE 10 MG: 10 TABLET ORAL at 08:55

## 2021-11-17 RX ADMIN — BUSPIRONE HYDROCHLORIDE 5 MG: 5 TABLET ORAL at 20:30

## 2021-11-17 RX ADMIN — HALOPERIDOL 1 MG: 1 TABLET ORAL at 12:24

## 2021-11-17 RX ADMIN — BUSPIRONE HYDROCHLORIDE 5 MG: 5 TABLET ORAL at 14:04

## 2021-11-17 RX ADMIN — ASPIRIN 81 MG CHEWABLE TABLET 81 MG: 81 TABLET CHEWABLE at 08:55

## 2021-11-17 RX ADMIN — SALMETEROL XINAFOATE 1 PUFF: 50 POWDER, METERED ORAL; RESPIRATORY (INHALATION) at 20:30

## 2021-11-17 RX ADMIN — LEVOTHYROXINE SODIUM 88 MCG: 88 TABLET ORAL at 08:55

## 2021-11-17 RX ADMIN — ATORVASTATIN CALCIUM 80 MG: 80 TABLET, FILM COATED ORAL at 20:29

## 2021-11-17 RX ADMIN — SALMETEROL XINAFOATE 1 PUFF: 50 POWDER, METERED ORAL; RESPIRATORY (INHALATION) at 08:54

## 2021-11-17 RX ADMIN — MEGESTROL ACETATE 20 MG: 20 TABLET ORAL at 08:55

## 2021-11-17 RX ADMIN — MIRTAZAPINE 15 MG: 15 TABLET, FILM COATED ORAL at 20:38

## 2021-11-17 RX ADMIN — CLOZAPINE 300 MG: 100 TABLET ORAL at 20:29

## 2021-11-17 RX ADMIN — NYSTATIN 500000 UNITS: 100000 SUSPENSION ORAL at 20:30

## 2021-11-17 RX ADMIN — Medication 50 MCG: at 08:55

## 2021-11-17 RX ADMIN — HALOPERIDOL 1 MG: 1 TABLET ORAL at 20:30

## 2021-11-17 ASSESSMENT — ACTIVITIES OF DAILY LIVING (ADL)
LAUNDRY: UNABLE TO COMPLETE
ORAL_HYGIENE: WITH ASSISTANCE
LAUNDRY: UNABLE TO COMPLETE
DRESS: WITH ASSISTANCE
ORAL_HYGIENE: WITH ASSISTANCE
HYGIENE/GROOMING: WITH ASSISTANCE
DRESS: WITH ASSISTANCE
HYGIENE/GROOMING: WITH ASSISTANCE

## 2021-11-17 NOTE — PLAN OF CARE
"Flat affect, pt verbalized he is \"tired\", mood labile. Pt denies SI/SIB, when pt reminded he has not been eating or taking fluids pt verbalized \"I'll be fine, I am fine, I'll eat scrambled eggs tomorrow\". Pt oriented to self only, pt verbalized the month is \"June\", the year is \"1998\" and he is in a \"treatment center\", pt reoriented to date and place, pt did not verbalize understanding. Pt declines to get out of bed, pt declines to eat, pt declines skin assessment, pt frequently changing positions independently in bed. Pt agreeable to drinking water with his AM medications. No evidence of \"cheeking\" observed. Pt has pungent smell, pt verbalized he will shower in \"2 hours, no tomorrow\", pt offered bed bath, pt refused verbalizing \"fuck you\". Pt frequently asking \"when will I be discharged\", plan of care reviewed with pt, pt verbalized \"fuck you now I am definitely not eating\"    I: 830 mL  O: pt verbalized he has voided today, pt poor historian.     BP 99/68 (lying) P 83 O2 93% RA, pt declined orthostatic VS check.     Cornel counts discontinoue   Pt declined breakfast, drank 100% clear ensure with strong encouragement, pt declined lunch    Pt continuous to use a medical bed for bed mobility in failure to thrive pt.     Plan is to continue to establish a therapeutic relationship with pt, encouraged ADLs, encourage oral intake and monitor.  "

## 2021-11-17 NOTE — PROGRESS NOTES
Patient seen, chart reviewed, care discussed with staff.  Blood pressure 99/68, pulse 83, temperature 97.6  F (36.4  C), temperature source Temporal, resp. rate 16, height 1.829 m (6'), weight 78.8 kg (173 lb 11.6 oz), SpO2 93 %.    Intake continues very low.  Does not want bed bath or shower, states he is too cold.    General appearance: discheveled  Alert.   Affect: fair  Mood: fair    Speech:  Decreased and dysarthric  Eye contact:  good.    Psychomotor behavior: decreased.  No tremor, akathisia, or cogwheel  Gait: not observed  Abnormal movements: none  Delusions: none voiced  Hallucinations:  None noted  Thoughts: logical  Associations: intact  Judgement: poor  Insight: poor  Cognitions: impaired  Memory: impaired  Orientation: not oriented to place, day, month  Acknowledged depression, he states haldol helped in the past  Not suicidal.    I believe his decline is due to dementia primarily, with multiple risk factors including TBI, Schizophrenia, and alcoholism    Imp: Schizophrenia  2.  Major neurocognitive    Plan: Increase Remeron to 15mg  2.  Add Haldol 1mg BID  3.  Continue Clozaril.      Jarvise meds include Clozaril, Risperdal, Invega, and Risperdal    Current Facility-Administered Medications   Medication     acetaminophen (TYLENOL) tablet 650 mg     albuterol (PROAIR HFA/PROVENTIL HFA/VENTOLIN HFA) 108 (90 Base) MCG/ACT inhaler 1-2 puff     alum & mag hydroxide-simethicone (MAALOX) suspension 30 mL     aspirin EC tablet 81 mg     atorvastatin (LIPITOR) tablet 80 mg     benzocaine-menthol (CEPACOL) 15-3.6 MG lozenge 1 lozenge     bisacodyl (DULCOLAX) Suppository 10 mg     busPIRone (BUSPAR) tablet 5 mg     cholecalciferol (VITAMIN D3) 1250 mcg (27052 units) capsule 1,250 mcg     cloZAPine (CLOZARIL) tablet 300 mg     haloperidol (HALDOL) tablet 1 mg     levothyroxine (SYNTHROID/LEVOTHROID) tablet 88 mcg     magnesium hydroxide (MILK OF MAGNESIA) suspension 30 mL     megestrol (MEGACE) tablet 20 mg      melatonin tablet 3 mg     memantine (NAMENDA) tablet 10 mg     [Held by provider] metoprolol tartrate (LOPRESSOR) tablet 25 mg     mirtazapine (REMERON) tablet 15 mg     nicotine (COMMIT) lozenge 2 mg     nystatin (MYCOSTATIN) suspension 500,000 Units     polyethylene glycol (MIRALAX) Packet 17 g     polyethylene glycol (MIRALAX) Packet 17 g     polyethylene glycol-propylene glycol PF (SYSTANE ULTRA PF) opthalmic solution 1 drop     risperiDONE (risperDAL M-TABS) ODT tab 1 mg     salmeterol (SEREVENT) diskus inhaler 1 puff     senna-docusate (SENOKOT-S/PERICOLACE) 8.6-50 MG per tablet 2 tablet     Vitamin D3 (CHOLECALCIFEROL) tablet 50 mcg     Recent Results (from the past 168 hour(s))   CBC with platelets and differential    Collection Time: 11/11/21  8:21 AM   Result Value Ref Range    WBC Count 11.6 (H) 4.0 - 11.0 10e3/uL    RBC Count 4.64 4.40 - 5.90 10e6/uL    Hemoglobin 14.2 13.3 - 17.7 g/dL    Hematocrit 42.3 40.0 - 53.0 %    MCV 91 78 - 100 fL    MCH 30.6 26.5 - 33.0 pg    MCHC 33.6 31.5 - 36.5 g/dL    RDW 13.2 10.0 - 15.0 %    Platelet Count 278 150 - 450 10e3/uL    % Neutrophils 68 %    % Lymphocytes 10 %    % Monocytes 10 %    % Eosinophils 10 %    % Basophils 1 %    % Immature Granulocytes 1 %    NRBCs per 100 WBC 0 <1 /100    Absolute Neutrophils 7.9 1.6 - 8.3 10e3/uL    Absolute Lymphocytes 1.2 0.8 - 5.3 10e3/uL    Absolute Monocytes 1.2 0.0 - 1.3 10e3/uL    Absolute Eosinophils 1.1 (H) 0.0 - 0.7 10e3/uL    Absolute Basophils 0.1 0.0 - 0.2 10e3/uL    Absolute Immature Granulocytes 0.1 (H) <=0.0 10e3/uL    Absolute NRBCs 0.0 10e3/uL   Basic metabolic panel    Collection Time: 11/12/21  8:38 AM   Result Value Ref Range    Sodium 138 133 - 144 mmol/L    Potassium 3.7 3.4 - 5.3 mmol/L    Chloride 110 (H) 94 - 109 mmol/L    Carbon Dioxide (CO2) 24 20 - 32 mmol/L    Anion Gap 4 3 - 14 mmol/L    Urea Nitrogen 18 7 - 30 mg/dL    Creatinine 0.71 0.66 - 1.25 mg/dL    Calcium 10.0 8.5 - 10.1 mg/dL    Glucose 118  (H) 70 - 99 mg/dL    GFR Estimate >90 >60 mL/min/1.73m2   Magnesium    Collection Time: 11/12/21  8:38 AM   Result Value Ref Range    Magnesium 2.2 1.6 - 2.3 mg/dL   Phosphorus    Collection Time: 11/12/21  8:38 AM   Result Value Ref Range    Phosphorus 4.1 2.5 - 4.5 mg/dL   Basic metabolic panel    Collection Time: 11/13/21  7:55 AM   Result Value Ref Range    Sodium 138 133 - 144 mmol/L    Potassium 3.9 3.4 - 5.3 mmol/L    Chloride 107 94 - 109 mmol/L    Carbon Dioxide (CO2) 25 20 - 32 mmol/L    Anion Gap 6 3 - 14 mmol/L    Urea Nitrogen 16 7 - 30 mg/dL    Creatinine 0.77 0.66 - 1.25 mg/dL    Calcium 9.5 8.5 - 10.1 mg/dL    Glucose 114 (H) 70 - 99 mg/dL    GFR Estimate >90 >60 mL/min/1.73m2   Phosphorus    Collection Time: 11/13/21  7:55 AM   Result Value Ref Range    Phosphorus 3.8 2.5 - 4.5 mg/dL   Magnesium    Collection Time: 11/13/21  7:55 AM   Result Value Ref Range    Magnesium 2.0 1.6 - 2.3 mg/dL   Basic metabolic panel    Collection Time: 11/15/21  8:57 AM   Result Value Ref Range    Sodium 137 133 - 144 mmol/L    Potassium 4.2 3.4 - 5.3 mmol/L    Chloride 109 94 - 109 mmol/L    Carbon Dioxide (CO2) 21 20 - 32 mmol/L    Anion Gap 7 3 - 14 mmol/L    Urea Nitrogen 22 7 - 30 mg/dL    Creatinine 0.86 0.66 - 1.25 mg/dL    Calcium 9.7 8.5 - 10.1 mg/dL    Glucose 109 (H) 70 - 99 mg/dL    GFR Estimate >90 >60 mL/min/1.73m2   Magnesium    Collection Time: 11/15/21  8:57 AM   Result Value Ref Range    Magnesium 2.3 1.6 - 2.3 mg/dL   Phosphorus    Collection Time: 11/15/21  8:57 AM   Result Value Ref Range    Phosphorus 4.0 2.5 - 4.5 mg/dL

## 2021-11-17 NOTE — PLAN OF CARE
Assessment/Intervention/Current Symtoms and Care Coordination  Pt was transferred from [facility] 10 to this unit for continuation of care.  Rounded in team and chart reviewed. Continuing with seeking additional information and considering engaging other systems for ideas on how to secure emergency guardianship.        Discharge Plan or Goal  Considerations include: medical unit     Barriers to Discharge  Patient does not have a guardian. Efforts to get a guardian have lizandro unsuccessful.     Referral Status  None  Options:  Possible secured SNF for P.G. Consider [facility] in [facility] for male memory care opening. The intake person is Karina and referrals can be faxed to: [phone], her phone is [phone].     Case Management Services:  [facility]   Kutris Chapin. Phone: [phone]  Supervisor is [email or web address]     Legal Status  Patient is under MI commitment in [facility]

## 2021-11-17 NOTE — PROGRESS NOTES
Patient slept a total of 11 hours. Able to reposition himself in bed independently. Remained dry the whole shift. Refused to get up to go to the bathroom. He said he was just too tired to get up from bed.

## 2021-11-17 NOTE — PROGRESS NOTES
Brief Medicine Note    Discussed patient with Adolph Mcgarry CNP, of Palliative Care. Mr. Mcgarry recommends use of appetite stimulants (as already ordered by Dr. Johnson) and consideration of medical causes of decreased appetite. I suspect his decreased appetite is most likely secondary to underlying neurocognitive disorder. Other potential but less likely etiologies of his unintentional weight loss could include malignancy, inflammatory bowel disease (though no other symptoms of this), chronic infectious process (negative HIV screening, no other s/s suggestive of infection). Thyroid function normal, he is not diabetic, no other s/s to suggest other endocrine issue.     While this patient has risk factors for underlying malignancy he is 1) unable to consent for and 2) would likely not be compliant with advanced cancer screening such as further imaging. Could certainly consider further lab work-up with PSA, Cologuard, etc. but positive test results would not  at this time.     Plan:   - At this time, there is no clear palliative question that would benefit from formal Palliative Care consultation.    - Continue to encourage oral intake   - Monitor BMP, mag, phos twice weekly    Medicine will follow labs peripherally. Please do not hesitate to contact if new questions or concerns arise.       Francine Couch PA-C  Hospitalist Service  Pager: [phone]

## 2021-11-17 NOTE — PLAN OF CARE
"Problem: Behavioral Health Plan of Care  Goal: Develops/Participates in Therapeutic [place] to Support Successful Transition  Intervention: Foster Therapeutic [place]  Recent Flowsheet Documentation  Taken 11/16/2021 2100 by Lucius Vega RN  Trust Relationship/Rapport:   care explained   choices provided   emotional support provided   empathic listening provided   questions answered   questions encouraged   reassurance provided   thoughts/feelings acknowledged    The patient spent the entire shift in his room and declined prompts and encouragement to socialize with peers or attend therapy groups. He presented with a blunted affect and appeared slightly irritable. He verbalized being anxious and depressed because \" I've been here too long!\" He refused his dinner, telling this writer, \" I ate lunch,\" but drank two cartons of milk and two cups of water with his medication totaling about 772 ml. He had a straw color urine output of about 480 ml but no bowel movement during the shift. He took his medicines with prompts and had no other concerns.  "

## 2021-11-18 LAB
ANION GAP SERPL CALCULATED.3IONS-SCNC: 6 MMOL/L (ref 3–14)
BASOPHILS # BLD AUTO: 0.1 10E3/UL (ref 0–0.2)
BASOPHILS NFR BLD AUTO: 1 %
BUN SERPL-MCNC: 19 MG/DL (ref 7–30)
CALCIUM SERPL-MCNC: 9.4 MG/DL (ref 8.5–10.1)
CHLORIDE BLD-SCNC: 110 MMOL/L (ref 94–109)
CO2 SERPL-SCNC: 21 MMOL/L (ref 20–32)
CREAT SERPL-MCNC: 0.64 MG/DL (ref 0.66–1.25)
EOSINOPHIL # BLD AUTO: 0.3 10E3/UL (ref 0–0.7)
EOSINOPHIL NFR BLD AUTO: 4 %
ERYTHROCYTE [DISTWIDTH] IN BLOOD BY AUTOMATED COUNT: 13.2 % (ref 10–15)
GFR SERPL CREATININE-BSD FRML MDRD: >90 ML/MIN/1.73M2
GLUCOSE BLD-MCNC: 118 MG/DL (ref 70–99)
HCT VFR BLD AUTO: 50.9 % (ref 40–53)
HGB BLD-MCNC: 16 G/DL (ref 13.3–17.7)
IMM GRANULOCYTES # BLD: 0 10E3/UL
IMM GRANULOCYTES NFR BLD: 0 %
LYMPHOCYTES # BLD AUTO: 1 10E3/UL (ref 0.8–5.3)
LYMPHOCYTES NFR BLD AUTO: 11 %
MAGNESIUM SERPL-MCNC: 2.2 MG/DL (ref 1.6–2.3)
MCH RBC QN AUTO: 30.7 PG (ref 26.5–33)
MCHC RBC AUTO-ENTMCNC: 31.4 G/DL (ref 31.5–36.5)
MCV RBC AUTO: 98 FL (ref 78–100)
MONOCYTES # BLD AUTO: 0.6 10E3/UL (ref 0–1.3)
MONOCYTES NFR BLD AUTO: 7 %
NEUTROPHILS # BLD AUTO: 7 10E3/UL (ref 1.6–8.3)
NEUTROPHILS NFR BLD AUTO: 77 %
NRBC # BLD AUTO: 0 10E3/UL
NRBC BLD AUTO-RTO: 0 /100
PHOSPHATE SERPL-MCNC: 3.7 MG/DL (ref 2.5–4.5)
PLATELET # BLD AUTO: 274 10E3/UL (ref 150–450)
POTASSIUM BLD-SCNC: 4.1 MMOL/L (ref 3.4–5.3)
RBC # BLD AUTO: 5.21 10E6/UL (ref 4.4–5.9)
SODIUM SERPL-SCNC: 137 MMOL/L (ref 133–144)
WBC # BLD AUTO: 9.1 10E3/UL (ref 4–11)

## 2021-11-18 PROCEDURE — 36415 COLL VENOUS BLD VENIPUNCTURE: CPT | Performed by: PHYSICIAN ASSISTANT

## 2021-11-18 PROCEDURE — 124N000003 HC R&B MH SENIOR/ADOLESCENT

## 2021-11-18 PROCEDURE — 82310 ASSAY OF CALCIUM: CPT | Performed by: PHYSICIAN ASSISTANT

## 2021-11-18 PROCEDURE — 85025 COMPLETE CBC W/AUTO DIFF WBC: CPT | Performed by: PSYCHIATRY & NEUROLOGY

## 2021-11-18 PROCEDURE — 83735 ASSAY OF MAGNESIUM: CPT | Performed by: PHYSICIAN ASSISTANT

## 2021-11-18 PROCEDURE — 250N000013 HC RX MED GY IP 250 OP 250 PS 637: Performed by: PSYCHIATRY & NEUROLOGY

## 2021-11-18 PROCEDURE — 84100 ASSAY OF PHOSPHORUS: CPT | Performed by: PHYSICIAN ASSISTANT

## 2021-11-18 PROCEDURE — 250N000013 HC RX MED GY IP 250 OP 250 PS 637: Performed by: PHYSICIAN ASSISTANT

## 2021-11-18 RX ORDER — GABAPENTIN 100 MG/1
200 CAPSULE ORAL
Status: DISCONTINUED | OUTPATIENT
Start: 2021-11-18 | End: 2021-11-24

## 2021-11-18 RX ORDER — BUSPIRONE HYDROCHLORIDE 10 MG/1
10 TABLET ORAL 3 TIMES DAILY
Status: DISCONTINUED | OUTPATIENT
Start: 2021-11-18 | End: 2021-12-10

## 2021-11-18 RX ADMIN — CLOZAPINE 300 MG: 100 TABLET ORAL at 20:44

## 2021-11-18 RX ADMIN — CHOLECALCIFEROL CAP 1.25 MG (50000 UNIT) 1250 MCG: 1.25 CAP at 08:47

## 2021-11-18 RX ADMIN — HALOPERIDOL 1 MG: 1 TABLET ORAL at 20:44

## 2021-11-18 RX ADMIN — SALMETEROL XINAFOATE 1 PUFF: 50 POWDER, METERED ORAL; RESPIRATORY (INHALATION) at 08:46

## 2021-11-18 RX ADMIN — NYSTATIN 500000 UNITS: 100000 SUSPENSION ORAL at 13:50

## 2021-11-18 RX ADMIN — NYSTATIN 500000 UNITS: 100000 SUSPENSION ORAL at 20:44

## 2021-11-18 RX ADMIN — ASPIRIN 81 MG CHEWABLE TABLET 81 MG: 81 TABLET CHEWABLE at 08:44

## 2021-11-18 RX ADMIN — BUSPIRONE HYDROCHLORIDE 5 MG: 5 TABLET ORAL at 13:50

## 2021-11-18 RX ADMIN — MIRTAZAPINE 15 MG: 15 TABLET, FILM COATED ORAL at 20:43

## 2021-11-18 RX ADMIN — LEVOTHYROXINE SODIUM 88 MCG: 88 TABLET ORAL at 08:44

## 2021-11-18 RX ADMIN — SALMETEROL XINAFOATE 1 PUFF: 50 POWDER, METERED ORAL; RESPIRATORY (INHALATION) at 20:44

## 2021-11-18 RX ADMIN — MEMANTINE 10 MG: 10 TABLET ORAL at 08:44

## 2021-11-18 RX ADMIN — BUSPIRONE HYDROCHLORIDE 10 MG: 10 TABLET ORAL at 20:43

## 2021-11-18 RX ADMIN — ATORVASTATIN CALCIUM 80 MG: 80 TABLET, FILM COATED ORAL at 20:44

## 2021-11-18 RX ADMIN — Medication 50 MCG: at 08:44

## 2021-11-18 RX ADMIN — BUSPIRONE HYDROCHLORIDE 5 MG: 5 TABLET ORAL at 08:44

## 2021-11-18 RX ADMIN — HALOPERIDOL 1 MG: 1 TABLET ORAL at 08:44

## 2021-11-18 RX ADMIN — GABAPENTIN 200 MG: 100 CAPSULE ORAL at 17:32

## 2021-11-18 RX ADMIN — NYSTATIN 500000 UNITS: 100000 SUSPENSION ORAL at 08:44

## 2021-11-18 RX ADMIN — MEGESTROL ACETATE 20 MG: 20 TABLET ORAL at 08:44

## 2021-11-18 RX ADMIN — NYSTATIN 500000 UNITS: 100000 SUSPENSION ORAL at 16:25

## 2021-11-18 RX ADMIN — ALUMINUM HYDROXIDE, MAGNESIUM HYDROXIDE, AND SIMETHICONE 30 ML: 200; 200; 20 SUSPENSION ORAL at 12:50

## 2021-11-18 RX ADMIN — MEMANTINE 10 MG: 10 TABLET ORAL at 20:44

## 2021-11-18 ASSESSMENT — ACTIVITIES OF DAILY LIVING (ADL)
HYGIENE/GROOMING: PROMPTS;INDEPENDENT
ORAL_HYGIENE: WITH ASSISTANCE
ORAL_HYGIENE: WITH ASSISTANCE
HYGIENE/GROOMING: WITH ASSISTANCE
LAUNDRY: UNABLE TO COMPLETE
DRESS: INDEPENDENT;WITH ASSISTANCE
DRESS: WITH ASSISTANCE

## 2021-11-18 NOTE — PLAN OF CARE
Patient has remained in his room sleeping for the entire shift.  Refused to use the restroom when reminded. Patient slept for 9 hrs.  Continent this AM.

## 2021-11-18 NOTE — PLAN OF CARE
"  Pt presents with distractible thinking and poor concentration. Affect blunted and tense. Mood calm and depressed. Mood seen to be irritable intermittently. Activity isolative and withdrawn. Pt seen to spend majority of day in his bed resting/napping. Multiple staff have encouraged Pt throughout shift to go to group, the dining room for meals, the milieu and hallways. Pt declines each time. Pt seen to become irritable and angry surrounding encouragement to leave room, lab draw, meal/fluid encouragement and medication administration/cares.     Pt states his appetite is \"alright;\" however, Pt seen to decline both breakfast and lunch. Pt seen to drink Ensure slowly throughout day with encouragement. Pt also seen to intake water. See flowsheet for further detail. Pt states \"I'm not hungry!\" When asked about sleep hygiene Pt states \"I haven't been able to get a good night rest. At home I sleep naked.\" Pt encouraged to tell staff if he is having difficulty sleeping.     Pt denies any acute physical concerns; however, Pt endorses heart burn at approximately 1115 when writer was encouraging Pt to drink more ensure. Writer went to get Maalox and afternoon med for Pt. Writer returned to room and Pt declined med and states \"No! Not right now! I want to sleep!.\" Lab was able to get enough specimen on second attempt for blood work. BMP and WBC exhibits abnormalities (BMP: chloride, creatinine, glucose. WBC: WBC count, MCHC, absolute eosinophils, absolute immature granulocytes). SEE CHART REVIEW/LABORATORY for further details.     Pt denies any adverse/side effects to medications. Pt rates his anxiety and depression \"low\" on a low-medium-high scale. Pt denies SI/SIB/HI. Pt denies AH/VH; however, Pt appears to be responding to internal stimuli. Pt seen to break eye contact during some conversations, stare into space, start talking to self, smile and laugh slightly.     ADDENDUM 1250  Writer approached Pt at 1250 to administer " 1200 medication and to encourage Pt to eat lunch. Pt accepted MAALOX from writer. Writer then attempted to administer Pt's scheduled Nystatin mouth rinse. Pt declined multiple times. Pt was offered lunch, to get out of bed, Ensure, water and asked if he needing anything to make him more comfortable. Pt declined lunch, declined to get up from bed, declined Ensure and declined water. Pt declined needing anything to make him more comfortable.     ADDENDUM 1350  Pt accepted his Nystatin mouth rinse and his afternoon Buspar. Pt states that he voided X2 in his toilet today. Pt's total input: 480 mL. Pt drank approximately 50% of one Ensure despite much encouragement. Pt ate 0% of breakfast and 0% of lunch.
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
## 2021-11-18 NOTE — PROGRESS NOTES
Patient seen, chart reviewed, care discussed with staff.  Blood pressure 122/83, pulse 103, temperature 98  F (36.7  C), temperature source Temporal, resp. rate 16, height 1.829 m (6'), weight 78.8 kg (173 lb 11.6 oz), SpO2 94 %.    By report, irritable if disturbed, refusing to eat, hygine, refuses to get out of bed.    General appearance: discheveled, hair unwashed  Alert.   Affect: fair  Mood: fair    Speech:  Spontaneity decreased, latency.   Eye contact:  good.    Psychomotor behavior: decreased  Gait: not observed.    Abnormal movements: oral facial TD  Delusions: unable to assess  Hallucinations:  Unable to assess  Thoughts: concrete  Associations: intact  Judgement: poor  Insight: poor  Cognitions: impaired  Memory:  Impaired  Orientation: self only  Not suicidal.    Imp:  Major neurocognitive disorder  2.  Schizophrenia    Plan:  Add Gabapentin for irritability  2.  Increase Buspar    Current Facility-Administered Medications   Medication     acetaminophen (TYLENOL) tablet 650 mg     albuterol (PROAIR HFA/PROVENTIL HFA/VENTOLIN HFA) 108 (90 Base) MCG/ACT inhaler 1-2 puff     alum & mag hydroxide-simethicone (MAALOX) suspension 30 mL     aspirin EC tablet 81 mg     atorvastatin (LIPITOR) tablet 80 mg     benzocaine-menthol (CEPACOL) 15-3.6 MG lozenge 1 lozenge     bisacodyl (DULCOLAX) Suppository 10 mg     busPIRone (BUSPAR) tablet 10 mg     cholecalciferol (VITAMIN D3) 1250 mcg (13772 units) capsule 1,250 mcg     cloZAPine (CLOZARIL) tablet 300 mg     gabapentin (NEURONTIN) capsule 200 mg     haloperidol (HALDOL) tablet 1 mg     levothyroxine (SYNTHROID/LEVOTHROID) tablet 88 mcg     magnesium hydroxide (MILK OF MAGNESIA) suspension 30 mL     megestrol (MEGACE) tablet 20 mg     melatonin tablet 3 mg     memantine (NAMENDA) tablet 10 mg     [Held by provider] metoprolol tartrate (LOPRESSOR) tablet 25 mg     mirtazapine (REMERON) tablet 15 mg     nicotine (COMMIT) lozenge 2 mg     nystatin (MYCOSTATIN)  suspension 500,000 Units     polyethylene glycol (MIRALAX) Packet 17 g     polyethylene glycol (MIRALAX) Packet 17 g     polyethylene glycol-propylene glycol PF (SYSTANE ULTRA PF) opthalmic solution 1 drop     risperiDONE (risperDAL M-TABS) ODT tab 1 mg     salmeterol (SEREVENT) diskus inhaler 1 puff     senna-docusate (SENOKOT-S/PERICOLACE) 8.6-50 MG per tablet 2 tablet     Vitamin D3 (CHOLECALCIFEROL) tablet 50 mcg     Recent Results (from the past 168 hour(s))   Basic metabolic panel    Collection Time: 11/12/21  8:38 AM   Result Value Ref Range    Sodium 138 133 - 144 mmol/L    Potassium 3.7 3.4 - 5.3 mmol/L    Chloride 110 (H) 94 - 109 mmol/L    Carbon Dioxide (CO2) 24 20 - 32 mmol/L    Anion Gap 4 3 - 14 mmol/L    Urea Nitrogen 18 7 - 30 mg/dL    Creatinine 0.71 0.66 - 1.25 mg/dL    Calcium 10.0 8.5 - 10.1 mg/dL    Glucose 118 (H) 70 - 99 mg/dL    GFR Estimate >90 >60 mL/min/1.73m2   Magnesium    Collection Time: 11/12/21  8:38 AM   Result Value Ref Range    Magnesium 2.2 1.6 - 2.3 mg/dL   Phosphorus    Collection Time: 11/12/21  8:38 AM   Result Value Ref Range    Phosphorus 4.1 2.5 - 4.5 mg/dL   Basic metabolic panel    Collection Time: 11/13/21  7:55 AM   Result Value Ref Range    Sodium 138 133 - 144 mmol/L    Potassium 3.9 3.4 - 5.3 mmol/L    Chloride 107 94 - 109 mmol/L    Carbon Dioxide (CO2) 25 20 - 32 mmol/L    Anion Gap 6 3 - 14 mmol/L    Urea Nitrogen 16 7 - 30 mg/dL    Creatinine 0.77 0.66 - 1.25 mg/dL    Calcium 9.5 8.5 - 10.1 mg/dL    Glucose 114 (H) 70 - 99 mg/dL    GFR Estimate >90 >60 mL/min/1.73m2   Phosphorus    Collection Time: 11/13/21  7:55 AM   Result Value Ref Range    Phosphorus 3.8 2.5 - 4.5 mg/dL   Magnesium    Collection Time: 11/13/21  7:55 AM   Result Value Ref Range    Magnesium 2.0 1.6 - 2.3 mg/dL   Basic metabolic panel    Collection Time: 11/15/21  8:57 AM   Result Value Ref Range    Sodium 137 133 - 144 mmol/L    Potassium 4.2 3.4 - 5.3 mmol/L    Chloride 109 94 - 109 mmol/L     Carbon Dioxide (CO2) 21 20 - 32 mmol/L    Anion Gap 7 3 - 14 mmol/L    Urea Nitrogen 22 7 - 30 mg/dL    Creatinine 0.86 0.66 - 1.25 mg/dL    Calcium 9.7 8.5 - 10.1 mg/dL    Glucose 109 (H) 70 - 99 mg/dL    GFR Estimate >90 >60 mL/min/1.73m2   Magnesium    Collection Time: 11/15/21  8:57 AM   Result Value Ref Range    Magnesium 2.3 1.6 - 2.3 mg/dL   Phosphorus    Collection Time: 11/15/21  8:57 AM   Result Value Ref Range    Phosphorus 4.0 2.5 - 4.5 mg/dL   Basic metabolic panel    Collection Time: 11/18/21 10:19 AM   Result Value Ref Range    Sodium 137 133 - 144 mmol/L    Potassium 4.1 3.4 - 5.3 mmol/L    Chloride 110 (H) 94 - 109 mmol/L    Carbon Dioxide (CO2) 21 20 - 32 mmol/L    Anion Gap 6 3 - 14 mmol/L    Urea Nitrogen 19 7 - 30 mg/dL    Creatinine 0.64 (L) 0.66 - 1.25 mg/dL    Calcium 9.4 8.5 - 10.1 mg/dL    Glucose 118 (H) 70 - 99 mg/dL    GFR Estimate >90 >60 mL/min/1.73m2   Magnesium    Collection Time: 11/18/21 10:19 AM   Result Value Ref Range    Magnesium 2.2 1.6 - 2.3 mg/dL   Phosphorus    Collection Time: 11/18/21 10:19 AM   Result Value Ref Range    Phosphorus 3.7 2.5 - 4.5 mg/dL   CBC with platelets and differential    Collection Time: 11/18/21 10:19 AM   Result Value Ref Range    WBC Count 9.1 4.0 - 11.0 10e3/uL    RBC Count 5.21 4.40 - 5.90 10e6/uL    Hemoglobin 16.0 13.3 - 17.7 g/dL    Hematocrit 50.9 40.0 - 53.0 %    MCV 98 78 - 100 fL    MCH 30.7 26.5 - 33.0 pg    MCHC 31.4 (L) 31.5 - 36.5 g/dL    RDW 13.2 10.0 - 15.0 %    Platelet Count 274 150 - 450 10e3/uL    % Neutrophils 77 %    % Lymphocytes 11 %    % Monocytes 7 %    % Eosinophils 4 %    % Basophils 1 %    % Immature Granulocytes 0 %    NRBCs per 100 WBC 0 <1 /100    Absolute Neutrophils 7.0 1.6 - 8.3 10e3/uL    Absolute Lymphocytes 1.0 0.8 - 5.3 10e3/uL    Absolute Monocytes 0.6 0.0 - 1.3 10e3/uL    Absolute Eosinophils 0.3 0.0 - 0.7 10e3/uL    Absolute Basophils 0.1 0.0 - 0.2 10e3/uL    Absolute Immature Granulocytes 0.0 <=0.0  10e3/uL    Absolute NRBCs 0.0 10e3/uL

## 2021-11-18 NOTE — PLAN OF CARE
Assessment/Intervention/Current Symtoms and Care Coordination  Pt was transferred from [facility] to this unit for continuation of care.  Chart reviewed. Spoke with  this morning who said he has been running into problems and that [facility] cannot petition for guardianship. Stated that the [facility] Attorney had indicated it is possible for the [facility] to petition for guardianship but it does not happen often. Stated there is a huge systems challenges with the case. Stated he had connected with [facility] guardianship advocacy. Stated that the patient is under civil commitment. Stated that they had tried to see if the  can assign a guardian but the  declined.     Plan: Connect with [facility] Attorney.        Discharge Plan or Goal  Considerations include: medical unit     Barriers to Discharge  Patient does not have a guardian. Efforts to get a guardian have lizandro unsuccessful.     Referral Status  None  Options:  Possible secured SNF for [facility] in [facility] for male memory care opening. The intake person is Karina and referrals can be faxed to: [phone], her phone is [phone].     Case Management Services:  [facility]   Kurtis Chapin. Phone: [phone]  Supervisor is [email or web address]     Legal Status  Patient is under MI commitment in [facility]

## 2021-11-19 LAB — SARS-COV-2 RNA RESP QL NAA+PROBE: NEGATIVE

## 2021-11-19 PROCEDURE — 250N000013 HC RX MED GY IP 250 OP 250 PS 637: Performed by: PSYCHIATRY & NEUROLOGY

## 2021-11-19 PROCEDURE — 124N000003 HC R&B MH SENIOR/ADOLESCENT

## 2021-11-19 PROCEDURE — 250N000013 HC RX MED GY IP 250 OP 250 PS 637: Performed by: PHYSICIAN ASSISTANT

## 2021-11-19 PROCEDURE — 87635 SARS-COV-2 COVID-19 AMP PRB: CPT | Performed by: PSYCHIATRY & NEUROLOGY

## 2021-11-19 RX ADMIN — ASPIRIN 81 MG CHEWABLE TABLET 81 MG: 81 TABLET CHEWABLE at 08:26

## 2021-11-19 RX ADMIN — NYSTATIN 500000 UNITS: 100000 SUSPENSION ORAL at 17:12

## 2021-11-19 RX ADMIN — NYSTATIN 500000 UNITS: 100000 SUSPENSION ORAL at 20:24

## 2021-11-19 RX ADMIN — GABAPENTIN 200 MG: 100 CAPSULE ORAL at 17:13

## 2021-11-19 RX ADMIN — NYSTATIN 500000 UNITS: 100000 SUSPENSION ORAL at 13:02

## 2021-11-19 RX ADMIN — LEVOTHYROXINE SODIUM 88 MCG: 88 TABLET ORAL at 08:26

## 2021-11-19 RX ADMIN — MEMANTINE 10 MG: 10 TABLET ORAL at 08:26

## 2021-11-19 RX ADMIN — GABAPENTIN 200 MG: 100 CAPSULE ORAL at 13:02

## 2021-11-19 RX ADMIN — HALOPERIDOL 1 MG: 1 TABLET ORAL at 08:27

## 2021-11-19 RX ADMIN — Medication 50 MCG: at 08:27

## 2021-11-19 RX ADMIN — SALMETEROL XINAFOATE 1 PUFF: 50 POWDER, METERED ORAL; RESPIRATORY (INHALATION) at 08:29

## 2021-11-19 RX ADMIN — SALMETEROL XINAFOATE 1 PUFF: 50 POWDER, METERED ORAL; RESPIRATORY (INHALATION) at 20:25

## 2021-11-19 RX ADMIN — BUSPIRONE HYDROCHLORIDE 10 MG: 10 TABLET ORAL at 20:25

## 2021-11-19 RX ADMIN — ATORVASTATIN CALCIUM 80 MG: 80 TABLET, FILM COATED ORAL at 20:24

## 2021-11-19 RX ADMIN — HALOPERIDOL 1 MG: 1 TABLET ORAL at 20:25

## 2021-11-19 RX ADMIN — BUSPIRONE HYDROCHLORIDE 10 MG: 10 TABLET ORAL at 13:02

## 2021-11-19 RX ADMIN — MEGESTROL ACETATE 20 MG: 20 TABLET ORAL at 08:26

## 2021-11-19 RX ADMIN — CLOZAPINE 300 MG: 100 TABLET ORAL at 20:52

## 2021-11-19 RX ADMIN — GABAPENTIN 200 MG: 100 CAPSULE ORAL at 08:26

## 2021-11-19 RX ADMIN — BUSPIRONE HYDROCHLORIDE 10 MG: 10 TABLET ORAL at 08:26

## 2021-11-19 RX ADMIN — MIRTAZAPINE 15 MG: 15 TABLET, FILM COATED ORAL at 20:25

## 2021-11-19 RX ADMIN — NYSTATIN 500000 UNITS: 100000 SUSPENSION ORAL at 08:27

## 2021-11-19 RX ADMIN — MEMANTINE 10 MG: 10 TABLET ORAL at 20:25

## 2021-11-19 ASSESSMENT — ACTIVITIES OF DAILY LIVING (ADL)
DRESS: WITH ASSISTANCE
HYGIENE/GROOMING: PROMPTS;INDEPENDENT
ORAL_HYGIENE: PROMPTS;INDEPENDENT
HYGIENE/GROOMING: PROMPTS;INDEPENDENT
LAUNDRY: UNABLE TO COMPLETE
LAUNDRY: UNABLE TO COMPLETE
ORAL_HYGIENE: WITH ASSISTANCE
DRESS: SCRUBS (BEHAVIORAL HEALTH);INDEPENDENT

## 2021-11-19 NOTE — PROGRESS NOTES
Patient seen, chart reviewed, care discussed with staff.  Blood pressure 100/69, pulse 107, temperature 98.3  F (36.8  C), temperature source Oral, resp. rate 16, height 1.829 m (6'), weight 78.8 kg (173 lb 11.6 oz), SpO2 95 %.    By report, irritable if approached, very poor ability to learn.  Slept ok.  Noted talking to himself.  Intake poor.    General appearance: discheveled  Alert.   Affect: labile, ittitable  Mood: labile, sad  Speech:  Decreased production, dysarthric.   Eye contact:  good.    Psychomotor behavior: decreased  Gait: resists being out of bed  Abnormal movements: moderate oral facial TD  Delusions: uncertain  Hallucinations:  present  Thoughts: concrete  Associations: few  Judgement: poor  Insight: poor  Cognitions: impaired  Memory:  Impaired, does not remember where he is  Orientation: only to self  Not suicidal.    Imp: Schizophrenia,   2.  Major cognitive disorder, probably end stage    Plan:  Treat the irritability; Gabapentin started yesterday, and Buspar was increased yesterday.    Current Facility-Administered Medications   Medication     acetaminophen (TYLENOL) tablet 650 mg     albuterol (PROAIR HFA/PROVENTIL HFA/VENTOLIN HFA) 108 (90 Base) MCG/ACT inhaler 1-2 puff     alum & mag hydroxide-simethicone (MAALOX) suspension 30 mL     aspirin EC tablet 81 mg     atorvastatin (LIPITOR) tablet 80 mg     benzocaine-menthol (CEPACOL) 15-3.6 MG lozenge 1 lozenge     bisacodyl (DULCOLAX) Suppository 10 mg     busPIRone (BUSPAR) tablet 10 mg     cholecalciferol (VITAMIN D3) 1250 mcg (12777 units) capsule 1,250 mcg     cloZAPine (CLOZARIL) tablet 300 mg     gabapentin (NEURONTIN) capsule 200 mg     haloperidol (HALDOL) tablet 1 mg     levothyroxine (SYNTHROID/LEVOTHROID) tablet 88 mcg     magnesium hydroxide (MILK OF MAGNESIA) suspension 30 mL     megestrol (MEGACE) tablet 20 mg     melatonin tablet 3 mg     memantine (NAMENDA) tablet 10 mg     [Held by provider] metoprolol tartrate (LOPRESSOR)  tablet 25 mg     mirtazapine (REMERON) tablet 15 mg     nicotine (COMMIT) lozenge 2 mg     nystatin (MYCOSTATIN) suspension 500,000 Units     polyethylene glycol (MIRALAX) Packet 17 g     polyethylene glycol (MIRALAX) Packet 17 g     polyethylene glycol-propylene glycol PF (SYSTANE ULTRA PF) opthalmic solution 1 drop     risperiDONE (risperDAL M-TABS) ODT tab 1 mg     salmeterol (SEREVENT) diskus inhaler 1 puff     senna-docusate (SENOKOT-S/PERICOLACE) 8.6-50 MG per tablet 2 tablet     Vitamin D3 (CHOLECALCIFEROL) tablet 50 mcg     .  Recent Results (from the past 168 hour(s))   Basic metabolic panel    Collection Time: 11/13/21  7:55 AM   Result Value Ref Range    Sodium 138 133 - 144 mmol/L    Potassium 3.9 3.4 - 5.3 mmol/L    Chloride 107 94 - 109 mmol/L    Carbon Dioxide (CO2) 25 20 - 32 mmol/L    Anion Gap 6 3 - 14 mmol/L    Urea Nitrogen 16 7 - 30 mg/dL    Creatinine 0.77 0.66 - 1.25 mg/dL    Calcium 9.5 8.5 - 10.1 mg/dL    Glucose 114 (H) 70 - 99 mg/dL    GFR Estimate >90 >60 mL/min/1.73m2   Phosphorus    Collection Time: 11/13/21  7:55 AM   Result Value Ref Range    Phosphorus 3.8 2.5 - 4.5 mg/dL   Magnesium    Collection Time: 11/13/21  7:55 AM   Result Value Ref Range    Magnesium 2.0 1.6 - 2.3 mg/dL   Basic metabolic panel    Collection Time: 11/15/21  8:57 AM   Result Value Ref Range    Sodium 137 133 - 144 mmol/L    Potassium 4.2 3.4 - 5.3 mmol/L    Chloride 109 94 - 109 mmol/L    Carbon Dioxide (CO2) 21 20 - 32 mmol/L    Anion Gap 7 3 - 14 mmol/L    Urea Nitrogen 22 7 - 30 mg/dL    Creatinine 0.86 0.66 - 1.25 mg/dL    Calcium 9.7 8.5 - 10.1 mg/dL    Glucose 109 (H) 70 - 99 mg/dL    GFR Estimate >90 >60 mL/min/1.73m2   Magnesium    Collection Time: 11/15/21  8:57 AM   Result Value Ref Range    Magnesium 2.3 1.6 - 2.3 mg/dL   Phosphorus    Collection Time: 11/15/21  8:57 AM   Result Value Ref Range    Phosphorus 4.0 2.5 - 4.5 mg/dL   Basic metabolic panel    Collection Time: 11/18/21 10:19 AM   Result  Value Ref Range    Sodium 137 133 - 144 mmol/L    Potassium 4.1 3.4 - 5.3 mmol/L    Chloride 110 (H) 94 - 109 mmol/L    Carbon Dioxide (CO2) 21 20 - 32 mmol/L    Anion Gap 6 3 - 14 mmol/L    Urea Nitrogen 19 7 - 30 mg/dL    Creatinine 0.64 (L) 0.66 - 1.25 mg/dL    Calcium 9.4 8.5 - 10.1 mg/dL    Glucose 118 (H) 70 - 99 mg/dL    GFR Estimate >90 >60 mL/min/1.73m2   Magnesium    Collection Time: 11/18/21 10:19 AM   Result Value Ref Range    Magnesium 2.2 1.6 - 2.3 mg/dL   Phosphorus    Collection Time: 11/18/21 10:19 AM   Result Value Ref Range    Phosphorus 3.7 2.5 - 4.5 mg/dL   CBC with platelets and differential    Collection Time: 11/18/21 10:19 AM   Result Value Ref Range    WBC Count 9.1 4.0 - 11.0 10e3/uL    RBC Count 5.21 4.40 - 5.90 10e6/uL    Hemoglobin 16.0 13.3 - 17.7 g/dL    Hematocrit 50.9 40.0 - 53.0 %    MCV 98 78 - 100 fL    MCH 30.7 26.5 - 33.0 pg    MCHC 31.4 (L) 31.5 - 36.5 g/dL    RDW 13.2 10.0 - 15.0 %    Platelet Count 274 150 - 450 10e3/uL    % Neutrophils 77 %    % Lymphocytes 11 %    % Monocytes 7 %    % Eosinophils 4 %    % Basophils 1 %    % Immature Granulocytes 0 %    NRBCs per 100 WBC 0 <1 /100    Absolute Neutrophils 7.0 1.6 - 8.3 10e3/uL    Absolute Lymphocytes 1.0 0.8 - 5.3 10e3/uL    Absolute Monocytes 0.6 0.0 - 1.3 10e3/uL    Absolute Eosinophils 0.3 0.0 - 0.7 10e3/uL    Absolute Basophils 0.1 0.0 - 0.2 10e3/uL    Absolute Immature Granulocytes 0.0 <=0.0 10e3/uL    Absolute NRBCs 0.0 10e3/uL

## 2021-11-19 NOTE — PLAN OF CARE
"Assessment/Intervention/Current Symtoms and Care Coordination  Pt was transferred from [facility] to this unit for continuation of care.  Chart reviewed. Had a meeting with pt and his  today.  During the meeting with pt's  and writer, pt responded \"I am speaking with my sister, right now\" when asked if she has been in touch with her sister. She laid in bed but responded to all questions. Following a few conversations, he reported \"I have to go back to sleep, shortly.\" During the meeting, pt requested Macaroni and Cheese. Staff assisted in calling the cafeteria for this, and pt is currently eating it as at 3:37pm.     Plan: Connect with [facility] Attorney.     Discharge Plan or Goal  Considerations include: medical unit     Barriers to Discharge  Patient does not have a guardian. Efforts to get a guardian have lizandro unsuccessful.     Referral Status  None  Options:  Possible secured SNF for [facility] Consider [place] in [facility] for male memory care opening. The intake person is Karina and referrals can be faxed to: [phone], her phone is [phone].     Case Management Services:  [facility]   Kurtis Chapin. Phone: [phone]  Supervisor is [email or web address]     Legal Status  Patient is under MI commitment in [facility]  "

## 2021-11-19 NOTE — PLAN OF CARE
Problem: Sleep Disturbance  Goal: Adequate Sleep/Rest  Outcome: No Change      Patient slept well throughout the shift, a total of 2.5  hours. Reminded to get up to void, but patient refused to  do so. Remained dry the whole shift.

## 2021-11-19 NOTE — PLAN OF CARE
Patient has been isolative to room all shift. Her presents as withdrawn, irritable with flat affect. He refused to eat dinner after numerous attempts. He refused supplements as well. He did drink 600 ml of water with prompts and strong encouragement. He refused covid swab. Urin output 400 ml tea colored urine. When patient is encouraged to drink, eat and come out of his room he becomes very irritable and yells. He refuses to attend groups and take a shower. He denies all MH symptoms. He appears responding to internal stimuli but denies AH and VH. He denies pain. He is disheveled and unkept. His speech is mumbled and soft. VSS. He was started on 200 gabapentin TID and Buspar increased to 10 mg. He is medication compliant. He voices no concerns.

## 2021-11-19 NOTE — PLAN OF CARE
"  Problem: Behavioral Health Plan of Care  Goal: Plan of Care Review  Outcome: No Change  Flowsheets (Taken 11/19/2021 1100)  Plan of Care Reviewed With: patient  Patient Agreement with Plan of Care: refuses to participate     Problem: Psychotic Symptoms  Goal: Social and Therapeutic (Psychotic Symptoms)  Description: Signs and symptoms of listed problems will be absent or manageable.  Outcome: No Change     Problem: Cognitive Impairment (Psychotic Signs/Symptoms)  Goal: Optimal Cognitive Function (Psychotic Signs/Symptoms)  Outcome: No Change  Intervention: Support and Promote Cognitive Ability  Recent Flowsheet Documentation  Taken 11/19/2021 1100 by Juarez Murphy, RN  Trust Relationship/Rapport: care explained     Problem: Malnutrition  Goal: Improved Nutritional Intake  Outcome: Declining     Patient isolative to room and withdrawn. Did not attend groups. Affect flat and blunted, patient is unmotivated to participate in care plan. Patient was found mumbling to self and smiling on one occasion. Speech difficult to understand d/t mumbling but patient denies anxiety, depression, SI,SIB. Oriented to self. Patient asked \"how long will I be here?\". Writer encouraged patient to participate in care plan to expedite discharge. When encouraged patient becomes irritable and begins to raise voice.   Poor oral intake, did not eat breakfast or lunch. Intake was 300cc with encouragement. Patient reported voiding but no observed void, and collection container empty in toilet. Patient is medication compliant, covid swab test resulted negative.      Patient's  arrived to unit around 3 PM to assess patient  "

## 2021-11-20 LAB — GLUCOSE BLDC GLUCOMTR-MCNC: 101 MG/DL (ref 70–99)

## 2021-11-20 PROCEDURE — 250N000013 HC RX MED GY IP 250 OP 250 PS 637: Performed by: PSYCHIATRY & NEUROLOGY

## 2021-11-20 PROCEDURE — 250N000013 HC RX MED GY IP 250 OP 250 PS 637: Performed by: PHYSICIAN ASSISTANT

## 2021-11-20 PROCEDURE — 124N000003 HC R&B MH SENIOR/ADOLESCENT

## 2021-11-20 RX ADMIN — LEVOTHYROXINE SODIUM 88 MCG: 88 TABLET ORAL at 09:25

## 2021-11-20 RX ADMIN — GABAPENTIN 200 MG: 100 CAPSULE ORAL at 17:32

## 2021-11-20 RX ADMIN — BUSPIRONE HYDROCHLORIDE 10 MG: 10 TABLET ORAL at 13:15

## 2021-11-20 RX ADMIN — MEMANTINE 10 MG: 10 TABLET ORAL at 09:24

## 2021-11-20 RX ADMIN — HALOPERIDOL 1 MG: 1 TABLET ORAL at 22:05

## 2021-11-20 RX ADMIN — CLOZAPINE 300 MG: 100 TABLET ORAL at 22:04

## 2021-11-20 RX ADMIN — MEGESTROL ACETATE 20 MG: 20 TABLET ORAL at 09:25

## 2021-11-20 RX ADMIN — GABAPENTIN 200 MG: 100 CAPSULE ORAL at 13:15

## 2021-11-20 RX ADMIN — Medication 50 MCG: at 09:26

## 2021-11-20 RX ADMIN — NYSTATIN 500000 UNITS: 100000 SUSPENSION ORAL at 17:50

## 2021-11-20 RX ADMIN — SALMETEROL XINAFOATE 1 PUFF: 50 POWDER, METERED ORAL; RESPIRATORY (INHALATION) at 09:22

## 2021-11-20 RX ADMIN — ASPIRIN 81 MG CHEWABLE TABLET 81 MG: 81 TABLET CHEWABLE at 09:23

## 2021-11-20 RX ADMIN — GABAPENTIN 200 MG: 100 CAPSULE ORAL at 09:23

## 2021-11-20 RX ADMIN — BUSPIRONE HYDROCHLORIDE 10 MG: 10 TABLET ORAL at 09:24

## 2021-11-20 RX ADMIN — HALOPERIDOL 1 MG: 1 TABLET ORAL at 09:22

## 2021-11-20 RX ADMIN — BUSPIRONE HYDROCHLORIDE 10 MG: 10 TABLET ORAL at 22:05

## 2021-11-20 ASSESSMENT — ACTIVITIES OF DAILY LIVING (ADL)
HYGIENE/GROOMING: PROMPTS;WITH ASSISTANCE
DRESS: PROMPTS;WITH ASSISTANCE
ORAL_HYGIENE: PROMPTS;WITH ASSISTANCE
DRESS: SCRUBS (BEHAVIORAL HEALTH)
LAUNDRY: UNABLE TO COMPLETE
LAUNDRY: UNABLE TO COMPLETE
ORAL_HYGIENE: PROMPTS;INDEPENDENT
HYGIENE/GROOMING: PROMPTS;INDEPENDENT

## 2021-11-20 NOTE — PLAN OF CARE
"Patient has been isolative to his room all shift. He did walk to the dining room once stating his friend was\" coming to pick him up\". His gait was unsteady. He is disheveled and unkept but refuses shower. His affect is flat, tense. His mood is labile, irritable. His speech is mumbled and pressured at times. He is oriented to self only. He asked this writer where he was, how he got here and who brought him. His questions were answered. He then said \"my sister is taking me home today\". This writer explained he was not discharging until he ate and got better. He screamed \"then get the fuck out of here\". He denies SI and SIB. He denies anxiety and depression. He denies pain. His thoughts are disorganized. He makes delusional statements. He has been sleeping all shift. He refused to eat dinner or drink supplements. He did drink 540 ml of water and tea. His output measured was 100 ml. He is medication compliant.   "

## 2021-11-20 NOTE — PLAN OF CARE
"Flat affect, mood is labile. Pt will tell staff to \"get the fuck out\" at times. Pt oriented to self only, pt thinks he is in \"[place]\" and the date is June 2006, pt reoriented, calender and clock in pt's room. Pt keeps asking when his ride will be here, pt reminded he is not discharging yet, pt did not appear to understand. Pt declined skin assessment, pt declined to get out of bed, urinal at bedside. Pt repositioning independently in bed. Red area near pt's right temple observed, pt declined further assessment at this time.     Pt agreeable to taking sips of water, few bits of magic cup, eggs and mandarin oranges.     I: 400  O: none witnessed    /79 P 110 O2 97% on RA, pt declines orthostatic VS checks.   "

## 2021-11-20 NOTE — PLAN OF CARE
Problem: Sleep Disturbance  Goal: Adequate Sleep/Rest  Outcome: No Change     Patient slept soundly and uninterrupted for 12.5 hours. Was asked to get up to go to the bathroom once during the night but he refused. No concerns raised the whole shift.

## 2021-11-21 PROCEDURE — 124N000003 HC R&B MH SENIOR/ADOLESCENT

## 2021-11-21 PROCEDURE — 250N000013 HC RX MED GY IP 250 OP 250 PS 637: Performed by: PSYCHIATRY & NEUROLOGY

## 2021-11-21 PROCEDURE — 250N000013 HC RX MED GY IP 250 OP 250 PS 637: Performed by: PHYSICIAN ASSISTANT

## 2021-11-21 RX ADMIN — GABAPENTIN 200 MG: 100 CAPSULE ORAL at 12:26

## 2021-11-21 RX ADMIN — ASPIRIN 81 MG CHEWABLE TABLET 81 MG: 81 TABLET CHEWABLE at 08:37

## 2021-11-21 RX ADMIN — GABAPENTIN 200 MG: 100 CAPSULE ORAL at 17:46

## 2021-11-21 RX ADMIN — LEVOTHYROXINE SODIUM 88 MCG: 88 TABLET ORAL at 08:37

## 2021-11-21 RX ADMIN — NYSTATIN 500000 UNITS: 100000 SUSPENSION ORAL at 12:26

## 2021-11-21 RX ADMIN — BUSPIRONE HYDROCHLORIDE 10 MG: 10 TABLET ORAL at 13:44

## 2021-11-21 RX ADMIN — MEGESTROL ACETATE 20 MG: 20 TABLET ORAL at 08:38

## 2021-11-21 RX ADMIN — ATORVASTATIN CALCIUM 80 MG: 80 TABLET, FILM COATED ORAL at 20:50

## 2021-11-21 RX ADMIN — HALOPERIDOL 1 MG: 1 TABLET ORAL at 20:50

## 2021-11-21 RX ADMIN — HALOPERIDOL 1 MG: 1 TABLET ORAL at 08:37

## 2021-11-21 RX ADMIN — MEMANTINE 10 MG: 10 TABLET ORAL at 20:52

## 2021-11-21 RX ADMIN — GABAPENTIN 200 MG: 100 CAPSULE ORAL at 08:36

## 2021-11-21 RX ADMIN — NYSTATIN 500000 UNITS: 100000 SUSPENSION ORAL at 08:38

## 2021-11-21 RX ADMIN — BUSPIRONE HYDROCHLORIDE 10 MG: 10 TABLET ORAL at 20:51

## 2021-11-21 RX ADMIN — SALMETEROL XINAFOATE 1 PUFF: 50 POWDER, METERED ORAL; RESPIRATORY (INHALATION) at 08:38

## 2021-11-21 RX ADMIN — CLOZAPINE 300 MG: 100 TABLET ORAL at 20:49

## 2021-11-21 RX ADMIN — Medication 50 MCG: at 08:36

## 2021-11-21 RX ADMIN — MIRTAZAPINE 15 MG: 15 TABLET, FILM COATED ORAL at 20:51

## 2021-11-21 RX ADMIN — POLYETHYLENE GLYCOL 3350 17 G: 17 POWDER, FOR SOLUTION ORAL at 12:26

## 2021-11-21 RX ADMIN — NYSTATIN 500000 UNITS: 100000 SUSPENSION ORAL at 16:36

## 2021-11-21 RX ADMIN — BUSPIRONE HYDROCHLORIDE 10 MG: 10 TABLET ORAL at 08:37

## 2021-11-21 RX ADMIN — MEMANTINE 10 MG: 10 TABLET ORAL at 08:36

## 2021-11-21 ASSESSMENT — ACTIVITIES OF DAILY LIVING (ADL)
HYGIENE/GROOMING: PROMPTS;WITH ASSISTANCE
DRESS: PROMPTS;WITH ASSISTANCE
LAUNDRY: UNABLE TO COMPLETE
HYGIENE/GROOMING: PROMPTS;WITH ASSISTANCE
DRESS: PROMPTS;WITH ASSISTANCE
LAUNDRY: UNABLE TO COMPLETE
ORAL_HYGIENE: PROMPTS;WITH ASSISTANCE
ORAL_HYGIENE: PROMPTS;WITH ASSISTANCE

## 2021-11-21 NOTE — PLAN OF CARE
Flat affect (smiled at staff once), mood calm this shift. Pt declined to come out of his room for dinner. Pt declined to shower, pt declined bed bath. Pt neglected grooming, disheveled. Pt's finger and toe nails are long, pt declines to have them clipped. Pt's feet have thick white skin, pt declined to soak feet, lotion applied, no open areas observed. Pt thanked this writer for assistance.     I: 600 mL  O: cont of Icteric/yellow urine x 1, incont x1 Pt agreeable to having brief and pants changed. Pt declined jeanie cares, no skin breakdown observed, pt did not allow thorough assessment. Pt declined to have shirt changed.     Pt at 50% of mashed potatoes and 75% of muffin with strong encouragement.      [facility]. Pt able to stand unassisted after several attempts, pt appeared unsteady on his feet, pt reports feeling weak, pt sat back down on his bed, pt given a cup of water, pt dropped the cup, pt given a banana, pt dropped the banana, pt apologized for dropping items. /87 P 106 O2 95% RA  Pt verbalied he was tired and wanted to lay down in bed. Pt has tap bell and urinal at bedside, pt verbalized understanding to call for assistance before getting out of bed. Will continue to monitor.      Pt declined some of his HS meds: atorvastatin, memantine, mirtazapine, nystatin and salmeterol.

## 2021-11-21 NOTE — PLAN OF CARE
"  Problem: Behavioral Disturbance  Goal: Behavioral Disturbance  Description: Signs and symptoms of listed problems will be absent or manageable by discharge or transition of care.  Outcome: Improving     Problem: Malnutrition  Goal: Improved Nutritional Intake  Outcome: Improving     Patient withdrawn and isolative to his room. Flat affect and calm mood.   Patient refused to come out of his room, re-aproached many times. Refused shower and bed bath but washed his face with warm wet washcloth and went to the bathroom independently.   Ate 25% of his breakfast and lunch with 1080ml fluid intake for the whole shift, mostly juice and milk.   Patient has no recorded last BM in his chart, he denies feeling constipated, abdomen is soft and not distended with active bowel sounds In all 4 quadrants. Reported to IM and PRN Miralax x1 given.   Patient denies anxiety, depression, SI, SIB and hallucinations. Did not voice any concerns this shift. When asked how he feels he stated \"OK\". Slightly tachycardic with HR of 112, the rest of his vitals were WDL.   "

## 2021-11-21 NOTE — PLAN OF CARE
Problem: Sleep Disturbance  Goal: Adequate Sleep/Rest  Outcome: No Change     Patient had a total of 11 hours of interrupted sleep. Refused to get up during the night to go to the bathroom. No concerns raised the whole shift.

## 2021-11-22 LAB
ANION GAP SERPL CALCULATED.3IONS-SCNC: 6 MMOL/L (ref 3–14)
BUN SERPL-MCNC: 16 MG/DL (ref 7–30)
CALCIUM SERPL-MCNC: 9.4 MG/DL (ref 8.5–10.1)
CHLORIDE BLD-SCNC: 110 MMOL/L (ref 94–109)
CO2 SERPL-SCNC: 24 MMOL/L (ref 20–32)
CREAT SERPL-MCNC: 0.72 MG/DL (ref 0.66–1.25)
GFR SERPL CREATININE-BSD FRML MDRD: >90 ML/MIN/1.73M2
GLUCOSE BLD-MCNC: 126 MG/DL (ref 70–99)
MAGNESIUM SERPL-MCNC: 2.2 MG/DL (ref 1.6–2.3)
PHOSPHATE SERPL-MCNC: 2.7 MG/DL (ref 2.5–4.5)
POTASSIUM BLD-SCNC: 3.4 MMOL/L (ref 3.4–5.3)
SODIUM SERPL-SCNC: 140 MMOL/L (ref 133–144)

## 2021-11-22 PROCEDURE — 250N000013 HC RX MED GY IP 250 OP 250 PS 637: Performed by: PSYCHIATRY & NEUROLOGY

## 2021-11-22 PROCEDURE — 80048 BASIC METABOLIC PNL TOTAL CA: CPT | Performed by: PHYSICIAN ASSISTANT

## 2021-11-22 PROCEDURE — 250N000013 HC RX MED GY IP 250 OP 250 PS 637: Performed by: PHYSICIAN ASSISTANT

## 2021-11-22 PROCEDURE — 84100 ASSAY OF PHOSPHORUS: CPT | Performed by: PHYSICIAN ASSISTANT

## 2021-11-22 PROCEDURE — 124N000003 HC R&B MH SENIOR/ADOLESCENT

## 2021-11-22 PROCEDURE — 83735 ASSAY OF MAGNESIUM: CPT | Performed by: PHYSICIAN ASSISTANT

## 2021-11-22 PROCEDURE — 36415 COLL VENOUS BLD VENIPUNCTURE: CPT | Performed by: PHYSICIAN ASSISTANT

## 2021-11-22 RX ADMIN — NYSTATIN 500000 UNITS: 100000 SUSPENSION ORAL at 17:52

## 2021-11-22 RX ADMIN — BUSPIRONE HYDROCHLORIDE 10 MG: 10 TABLET ORAL at 13:05

## 2021-11-22 RX ADMIN — MEGESTROL ACETATE 20 MG: 20 TABLET ORAL at 08:37

## 2021-11-22 RX ADMIN — HALOPERIDOL 1 MG: 1 TABLET ORAL at 08:37

## 2021-11-22 RX ADMIN — SALMETEROL XINAFOATE 1 PUFF: 50 POWDER, METERED ORAL; RESPIRATORY (INHALATION) at 21:14

## 2021-11-22 RX ADMIN — BUSPIRONE HYDROCHLORIDE 10 MG: 10 TABLET ORAL at 21:14

## 2021-11-22 RX ADMIN — CLOZAPINE 250 MG: 100 TABLET ORAL at 21:14

## 2021-11-22 RX ADMIN — NYSTATIN 500000 UNITS: 100000 SUSPENSION ORAL at 08:37

## 2021-11-22 RX ADMIN — GABAPENTIN 200 MG: 100 CAPSULE ORAL at 13:05

## 2021-11-22 RX ADMIN — NYSTATIN 500000 UNITS: 100000 SUSPENSION ORAL at 21:14

## 2021-11-22 RX ADMIN — MEMANTINE 10 MG: 10 TABLET ORAL at 08:37

## 2021-11-22 RX ADMIN — MEMANTINE 10 MG: 10 TABLET ORAL at 21:14

## 2021-11-22 RX ADMIN — HALOPERIDOL 1 MG: 1 TABLET ORAL at 21:14

## 2021-11-22 RX ADMIN — Medication 50 MCG: at 08:37

## 2021-11-22 RX ADMIN — MIRTAZAPINE 15 MG: 15 TABLET, FILM COATED ORAL at 21:14

## 2021-11-22 RX ADMIN — BUSPIRONE HYDROCHLORIDE 10 MG: 10 TABLET ORAL at 08:37

## 2021-11-22 RX ADMIN — ASPIRIN 81 MG CHEWABLE TABLET 81 MG: 81 TABLET CHEWABLE at 08:37

## 2021-11-22 RX ADMIN — GABAPENTIN 200 MG: 100 CAPSULE ORAL at 17:53

## 2021-11-22 RX ADMIN — SALMETEROL XINAFOATE 1 PUFF: 50 POWDER, METERED ORAL; RESPIRATORY (INHALATION) at 08:37

## 2021-11-22 RX ADMIN — GABAPENTIN 200 MG: 100 CAPSULE ORAL at 08:37

## 2021-11-22 RX ADMIN — LEVOTHYROXINE SODIUM 88 MCG: 88 TABLET ORAL at 08:37

## 2021-11-22 RX ADMIN — ATORVASTATIN CALCIUM 80 MG: 80 TABLET, FILM COATED ORAL at 21:14

## 2021-11-22 RX ADMIN — NYSTATIN 500000 UNITS: 100000 SUSPENSION ORAL at 13:06

## 2021-11-22 ASSESSMENT — ACTIVITIES OF DAILY LIVING (ADL)
DRESS: WITH ASSISTANCE
ORAL_HYGIENE: WITH ASSISTANCE
DRESS: WITH ASSISTANCE
HYGIENE/GROOMING: WITH ASSISTANCE
LAUNDRY: UNABLE TO COMPLETE
HYGIENE/GROOMING: WITH ASSISTANCE
LAUNDRY: UNABLE TO COMPLETE
ORAL_HYGIENE: WITH ASSISTANCE

## 2021-11-22 NOTE — PLAN OF CARE
"  Problem: Psychotic Symptoms  Goal: Psychotic Symptoms  Description: Signs and symptoms of listed problems will be absent or manageable.  Outcome: No Change  Flowsheets  Taken 11/22/2021 1702 by Jonelle Delatorre RN  Psychotic Symptoms Present:    insight    speech    orientation    affect  Taken 11/19/2021 2059 by Janeth Gaffney RN  Psychotic Symptoms Assessed: all     Problem: Psychotic Symptoms  Goal: Social and Therapeutic (Psychotic Symptoms)  Description: Signs and symptoms of listed problems will be absent or manageable.  Outcome: No Change  Flowsheets (Taken 11/22/2021 1702)  Psychotic Symptoms Present:    insight    speech    orientation    affect    Patient is in his room most of the shift. His mood is irritable with a flat affect. He denies SI/SIB nor hallucinations. He denies racing thoughts. Patient still with poor insight of his hospitalization and has poor judgment too. He expresses his desire to go home because \"nothing is wrong with me\".  His speech is pressured and rambling. He is oriented to self only. With disorganized thoughts. Some minimal movements of the mouth observed. Pt. is med compliant. No cheeking of the meds observed. Patient refuses to attend the community meeting because he is tired as claimed. He yelled at this writer when his refusal to go to the community meeting was validated. Patient is unkempt. He looks disheveled. He denied pain in any part of his body. He refused to be assisted in going to the bathroom and other adl's. He only ate approximately 20-25% of his dinner. Fluids pushed. Intake was 460 ml (mainly water and orange juice). No BM this shift.  Incontinent pad provided.  Patient repositioned himself while in bed.  Patient refused to have his vital signs taken.     "

## 2021-11-22 NOTE — PLAN OF CARE
"Pt continues to be isolative and withdrawn.  Spent the shift lying in bed.  Pt approached by writer several times to ambulate the hallway with assist  - pt refused.  Responses to questions are brief mumbles or mute.  Pt denies all mental health symptoms / issues.  Pt changing positions in bed independently and with prompts.  Pt allowed writer to briefly check coccyx area - no redness observed and skin is intact.  Pt continent of urine, pull-up is dry.  Pt states the date is \"the 29th.\"      Pt refused scheduled Nystatin and Serevent inhaler; pt compliant with all other medications.      Appetite:  ate 25% of dinner meal (30% pasta, 20% magic cup)  Fluid intake:  560 mL, water and juice    BP 93/69 at start of shift.  Pt encouraged to drink fluids.  Follow-up /77.    Pt requires medical bed due to mobility issues and failure to thrive.      "

## 2021-11-22 NOTE — PLAN OF CARE
"  Problem: Cognitive Impairment (Psychotic Signs/Symptoms)  Goal: Optimal Cognitive Function (Psychotic Signs/Symptoms)  Outcome: Improving  Flowsheets (Taken 11/21/2021 3137)  Mutually Determined Action Steps (Optimal Cognitive Function): follows step-by-step instructions     Problem: Malnutrition  Goal: Improved Nutritional Intake  Outcome: No Change     Patient alert, oriented to self only. Denies pain.   Calm but easily gets frustrated. When frustrated yells \"get out of here\" or \"leave me alone, I am tired\", but is not aggressive towards self or staff. Patient denies anxiety, depression, SI, SIB or hallucinations. Does not appear responding to hallucinations.   Patient appetite is poor, ate only few bites with each meal and had 540ml of oral fluid intake for the whole shift. Patient requires A1 for feeding, SBA or supervision with ADLs. Gait is unsteady.   Skin intact, no skin issues or breakdown seen upon inspection.  Feet dry and callused. Patient was incontinent of large amount of urine at the beginning of the shift, he was agreeable to take shower and have his scrubs and bed linens changed. Refused to wash his hair.   No BM this shift.   "

## 2021-11-22 NOTE — PROGRESS NOTES
CLINICAL NUTRITION SERVICES - REASSESSMENT NOTE     Nutrition Prescription    RECOMMENDATIONS FOR MDs/PROVIDERS TO ORDER:  Recommend enteral nutrition support if patient accepts    Malnutrition Status:    Severe in the context of acute illness    Recommendations already ordered by Registered Dietitian (RD):  Continue current diet orders    Future/Additional Recommendations:  Follow goals of care, monitor intake, weight, appropriateness of nutrition support based on plan/goals of care     EVALUATION OF THE PROGRESS TOWARD GOALS   Diet: Mechanical/Dental Soft   Nutrition Support: TwoCal HN 3x/day with meals- 475 kcal/20 g protein per container), Ensure Enlive - bid between meals (350 kcal/20 g protein per container). If all supplements are consumed, patient will meet needs  Intake: intake remains poor--Pt will at times take part of meals--consumed 30% of pasta last night and 20% of magic cup with dinner, 25% of breakfast and lunch, and 1080 ml of juice and milk, but at other times refuses to eat any of meal or takes bites only. Intake of supplements is equally variable.      NEW FINDINGS   Weight: 89.3 kg (9/28)-->78.8 kg (11/16)- 10.5 kg (12%) weight loss over 2 months    Labs: reviewed    GI: constipation, LBM???      MALNUTRITION  % Intake: </= 50% for >/= 5 days (severe)  % Weight Loss: > 5% in 1 month (severe)  Subcutaneous Fat Loss: Unable to assess  Muscle Loss: Unable to assess  Fluid Accumulation/Edema: None noted  Malnutrition Diagnosis: Severe malnutrition in the context of acute illness    Previous Goals   Patient to consume % of nutritionally adequate meal trays TID, or the equivalent with supplements/snacks.  Evaluation: Not met    Previous Nutrition Diagnosis  Inadequate oral intake of unclear etiology (failure to thrive, menu fatigue) as evidenced by poor intake and severe weight loss  Evaluation: No change    CURRENT NUTRITION DIAGNOSIS  Inadequate oral intake of unclear etiology (failure to  thrive, menu fatigue) as evidenced by poor intake and severe weight loss     INTERVENTIONS  Implementation  Continue current diet orders    Goals  Patient to consume % of nutritionally adequate meal trays TID, or the equivalent with supplements/snacks.    Monitoring/Evaluation  Progress toward goals will be monitored and evaluated per protocol.    Najma Suarez RD, LD  ICU/5A/OB/Mental Health Pager (M-F): [phone]  On Call Pager (weekends only): [phone]

## 2021-11-22 NOTE — PLAN OF CARE
Patient had a total of 11.75 hours of sleep. Pt refused to get up during the night to go to the bathroom.

## 2021-11-22 NOTE — PROGRESS NOTES
Patient seen, chart reviewed, care discussed with staff.  Blood pressure 102/67, pulse 106, temperature 98.2  F (36.8  C), temperature source Temporal, resp. rate 16, height 1.829 m (6'), weight 78.8 kg (173 lb 11.6 oz), SpO2 95 %.    By report, showered!  Unsteady when up    General appearance: less discheveled  Alert, not irritable  Affect: fair  Mood: fair    Speech:  Decreased, but could converse with me today.  Dysarthric..   Eye contact:  good.    Psychomotor behavior: Decreased  Gait: as abovew  Abnormal movements: TD same  Delusions: present by report  Hallucinations:  Not certain  Thoughts: concretew  Associations: intact  Judgement: poor  Insight: poor  Cognitions: impaired  Memory:  impaired  Orientation: self only  Not suicidal.  He notes he is tired    Imp: Schizophrenia, major neurocognitive    Plan:  PT consult re gait  2.  Decrease Clozaril to 250mg.    Current Facility-Administered Medications   Medication     acetaminophen (TYLENOL) tablet 650 mg     albuterol (PROAIR HFA/PROVENTIL HFA/VENTOLIN HFA) 108 (90 Base) MCG/ACT inhaler 1-2 puff     alum & mag hydroxide-simethicone (MAALOX) suspension 30 mL     aspirin EC tablet 81 mg     atorvastatin (LIPITOR) tablet 80 mg     benzocaine-menthol (CEPACOL) 15-3.6 MG lozenge 1 lozenge     bisacodyl (DULCOLAX) Suppository 10 mg     busPIRone (BUSPAR) tablet 10 mg     cholecalciferol (VITAMIN D3) 1250 mcg (06449 units) capsule 1,250 mcg     cloZAPine (CLOZARIL) tablet 250 mg     gabapentin (NEURONTIN) capsule 200 mg     haloperidol (HALDOL) tablet 1 mg     levothyroxine (SYNTHROID/LEVOTHROID) tablet 88 mcg     megestrol (MEGACE) tablet 20 mg     melatonin tablet 3 mg     memantine (NAMENDA) tablet 10 mg     [Held by provider] metoprolol tartrate (LOPRESSOR) tablet 25 mg     mirtazapine (REMERON) tablet 15 mg     nicotine (COMMIT) lozenge 2 mg     nystatin (MYCOSTATIN) suspension 500,000 Units     polyethylene glycol (MIRALAX) Packet 17 g     polyethylene  glycol (MIRALAX) Packet 17 g     polyethylene glycol-propylene glycol PF (SYSTANE ULTRA PF) opthalmic solution 1 drop     risperiDONE (risperDAL M-TABS) ODT tab 1 mg     salmeterol (SEREVENT) diskus inhaler 1 puff     senna-docusate (SENOKOT-S/PERICOLACE) 8.6-50 MG per tablet 2 tablet     Vitamin D3 (CHOLECALCIFEROL) tablet 50 mcg     Recent Results (from the past 168 hour(s))   Basic metabolic panel    Collection Time: 11/18/21 10:19 AM   Result Value Ref Range    Sodium 137 133 - 144 mmol/L    Potassium 4.1 3.4 - 5.3 mmol/L    Chloride 110 (H) 94 - 109 mmol/L    Carbon Dioxide (CO2) 21 20 - 32 mmol/L    Anion Gap 6 3 - 14 mmol/L    Urea Nitrogen 19 7 - 30 mg/dL    Creatinine 0.64 (L) 0.66 - 1.25 mg/dL    Calcium 9.4 8.5 - 10.1 mg/dL    Glucose 118 (H) 70 - 99 mg/dL    GFR Estimate >90 >60 mL/min/1.73m2   Magnesium    Collection Time: 11/18/21 10:19 AM   Result Value Ref Range    Magnesium 2.2 1.6 - 2.3 mg/dL   Phosphorus    Collection Time: 11/18/21 10:19 AM   Result Value Ref Range    Phosphorus 3.7 2.5 - 4.5 mg/dL   CBC with platelets and differential    Collection Time: 11/18/21 10:19 AM   Result Value Ref Range    WBC Count 9.1 4.0 - 11.0 10e3/uL    RBC Count 5.21 4.40 - 5.90 10e6/uL    Hemoglobin 16.0 13.3 - 17.7 g/dL    Hematocrit 50.9 40.0 - 53.0 %    MCV 98 78 - 100 fL    MCH 30.7 26.5 - 33.0 pg    MCHC 31.4 (L) 31.5 - 36.5 g/dL    RDW 13.2 10.0 - 15.0 %    Platelet Count 274 150 - 450 10e3/uL    % Neutrophils 77 %    % Lymphocytes 11 %    % Monocytes 7 %    % Eosinophils 4 %    % Basophils 1 %    % Immature Granulocytes 0 %    NRBCs per 100 WBC 0 <1 /100    Absolute Neutrophils 7.0 1.6 - 8.3 10e3/uL    Absolute Lymphocytes 1.0 0.8 - 5.3 10e3/uL    Absolute Monocytes 0.6 0.0 - 1.3 10e3/uL    Absolute Eosinophils 0.3 0.0 - 0.7 10e3/uL    Absolute Basophils 0.1 0.0 - 0.2 10e3/uL    Absolute Immature Granulocytes 0.0 <=0.0 10e3/uL    Absolute NRBCs 0.0 10e3/uL   Asymptomatic COVID-19 Virus (Coronavirus) by  PCR Nasopharyngeal    Collection Time: 11/19/21 11:58 AM    Specimen: Nasopharyngeal; Swab   Result Value Ref Range    SARS CoV2 PCR Negative Negative   Glucose by meter    Collection Time: 11/20/21  8:26 PM   Result Value Ref Range    GLUCOSE BY METER POCT 101 (H) 70 - 99 mg/dL   Basic metabolic panel    Collection Time: 11/22/21 11:02 AM   Result Value Ref Range    Sodium 140 133 - 144 mmol/L    Potassium 3.4 3.4 - 5.3 mmol/L    Chloride 110 (H) 94 - 109 mmol/L    Carbon Dioxide (CO2) 24 20 - 32 mmol/L    Anion Gap 6 3 - 14 mmol/L    Urea Nitrogen 16 7 - 30 mg/dL    Creatinine 0.72 0.66 - 1.25 mg/dL    Calcium 9.4 8.5 - 10.1 mg/dL    Glucose 126 (H) 70 - 99 mg/dL    GFR Estimate >90 >60 mL/min/1.73m2   Magnesium    Collection Time: 11/22/21 11:02 AM   Result Value Ref Range    Magnesium 2.2 1.6 - 2.3 mg/dL   Phosphorus    Collection Time: 11/22/21 11:02 AM   Result Value Ref Range    Phosphorus 2.7 2.5 - 4.5 mg/dL     \

## 2021-11-23 PROCEDURE — 124N000003 HC R&B MH SENIOR/ADOLESCENT

## 2021-11-23 PROCEDURE — 250N000013 HC RX MED GY IP 250 OP 250 PS 637: Performed by: PHYSICIAN ASSISTANT

## 2021-11-23 PROCEDURE — 250N000013 HC RX MED GY IP 250 OP 250 PS 637: Performed by: PSYCHIATRY & NEUROLOGY

## 2021-11-23 RX ADMIN — BUSPIRONE HYDROCHLORIDE 10 MG: 10 TABLET ORAL at 21:52

## 2021-11-23 RX ADMIN — NYSTATIN 500000 UNITS: 100000 SUSPENSION ORAL at 08:11

## 2021-11-23 RX ADMIN — ATORVASTATIN CALCIUM 80 MG: 80 TABLET, FILM COATED ORAL at 21:57

## 2021-11-23 RX ADMIN — NYSTATIN 500000 UNITS: 100000 SUSPENSION ORAL at 12:00

## 2021-11-23 RX ADMIN — MEMANTINE 10 MG: 10 TABLET ORAL at 08:11

## 2021-11-23 RX ADMIN — HALOPERIDOL 1 MG: 1 TABLET ORAL at 21:52

## 2021-11-23 RX ADMIN — Medication 50 MCG: at 08:11

## 2021-11-23 RX ADMIN — MIRTAZAPINE 15 MG: 15 TABLET, FILM COATED ORAL at 21:56

## 2021-11-23 RX ADMIN — NYSTATIN 500000 UNITS: 100000 SUSPENSION ORAL at 18:00

## 2021-11-23 RX ADMIN — MEMANTINE 10 MG: 10 TABLET ORAL at 21:56

## 2021-11-23 RX ADMIN — GABAPENTIN 200 MG: 100 CAPSULE ORAL at 17:47

## 2021-11-23 RX ADMIN — ASPIRIN 81 MG CHEWABLE TABLET 81 MG: 81 TABLET CHEWABLE at 08:10

## 2021-11-23 RX ADMIN — HALOPERIDOL 1 MG: 1 TABLET ORAL at 08:10

## 2021-11-23 RX ADMIN — LEVOTHYROXINE SODIUM 88 MCG: 88 TABLET ORAL at 08:10

## 2021-11-23 RX ADMIN — BUSPIRONE HYDROCHLORIDE 10 MG: 10 TABLET ORAL at 13:45

## 2021-11-23 RX ADMIN — CLOZAPINE 250 MG: 100 TABLET ORAL at 21:51

## 2021-11-23 RX ADMIN — BUSPIRONE HYDROCHLORIDE 10 MG: 10 TABLET ORAL at 08:11

## 2021-11-23 RX ADMIN — SALMETEROL XINAFOATE 1 PUFF: 50 POWDER, METERED ORAL; RESPIRATORY (INHALATION) at 09:53

## 2021-11-23 RX ADMIN — GABAPENTIN 200 MG: 100 CAPSULE ORAL at 08:11

## 2021-11-23 RX ADMIN — GABAPENTIN 200 MG: 100 CAPSULE ORAL at 11:59

## 2021-11-23 RX ADMIN — MEGESTROL ACETATE 20 MG: 20 TABLET ORAL at 08:10

## 2021-11-23 ASSESSMENT — ACTIVITIES OF DAILY LIVING (ADL)
HYGIENE/GROOMING: WITH ASSISTANCE
ORAL_HYGIENE: WITH ASSISTANCE
LAUNDRY: UNABLE TO COMPLETE
DRESS: WITH ASSISTANCE

## 2021-11-23 ASSESSMENT — MIFFLIN-ST. JEOR: SCORE: 1639.1

## 2021-11-23 NOTE — PLAN OF CARE
Problem: Cognitive Impairment (Psychotic Signs/Symptoms)  Goal: Optimal Cognitive Function (Psychotic Signs/Symptoms)  Outcome: No Change  Intervention: Support and Promote Cognitive Ability  Recent Flowsheet Documentation  Taken 11/23/2021 1000 by Juarez Murphy RN  Trust Relationship/Rapport:   care explained   thoughts/feelings acknowledged   questions answered     Problem: General Rehab Plan of Care  Goal: Therapeutic Recreation/Music Therapy Goal  Description: The patient and/or their representative will achieve their patient-specific goals related to the plan of care.  The patient-specific goals include:  Outcome: No Change     Problem: Malnutrition  Goal: Improved Nutritional Intake  Outcome: No Change     Patient is calm, affect is flat and blunted. Medication compliant. Oriented to self only. Patient denies SI/SIB, and HI. Denies mental health symptoms and is eager to discharge. Patient is Isolative to room, patient did not attend groups, only left the bed to go to the bathroom per his report. Patient seemed highly motivated to get out of bed and leave the room only if he could smoke a cigarette. Patients pants do not appear to be visible soiled and patient would not allow staff to check pull up. Poor oral intake this shift, ate only a few bites of breakfast and lunch, fluids pushed. Patient intake about 700cc. Will continue to follow plane of care.

## 2021-11-23 NOTE — PLAN OF CARE
Problem: Sleep Disturbance  Goal: Adequate Sleep/Rest  Outcome: No Change     Slept most of the night for 8 hours.  Pt was awake at 0300 rounds and requested for water, drank 1/2 cup. Offered to help him to the toilet but pt declined.  No behavioral issues encountered this shift.

## 2021-11-23 NOTE — PLAN OF CARE
Assessment/Intervention/Current Symtoms and Care Coordination  -Met with team  -Reviewed chart  -Met with patient     Plan: awaiting [facility] guardianship outcomes related to this case.        Discharge Plan or Goal  Considerations include: medical unit; assessment and considerations are ongoing at this point.     Barriers to Discharge  Patient does not have a guardian. Efforts to get a guardian have lizandro unsuccessful.     Referral Status  None  Options:  Possible secured SNF for [facility]. Consider [facility] in [facility] for male memory care opening. The intake person is Karina and referrals can be faxed to: [phone], her phone is [phone].     Case Management Services:  [facility]   Kurtis Chapin. Phone: [phone]  Supervisor is [email or web address]     Legal Status  Patient is under MI commitment in [facility]

## 2021-11-23 NOTE — PROGRESS NOTES
Patient seen, chart reviewed, care discussed with staff.  Blood pressure 112/75, pulse 108, temperature 99.1  F (37.3  C), temperature source Temporal, resp. rate 16, height 1.829 m (6'), weight 77.6 kg (171 lb 1.6 oz), SpO2 95 %.    By report, irritable if pressed    General appearance: discheveled  Alert, complains of tiredness  Affect: fair  Mood: fair    Speech:  Decreased, dysarthric.   Eye contact:  good.    Psychomotor behavior: decreased  Gait: unsteady by yesterdays report   Abnormal movements: oral facial dyskinesia  Delusions: uncertain  Hallucinations:  probable  Thoughts: concrete  Associations: intact  Judgement: poor  Insight: poor  Cognitions: impaired  Memory:  Impaired  Orientation: self only  Not suicidal.    Imp:  Schizophrenia  2.  Major neurocognitive    Plan:  Clozaril decreased yesterday    Current Facility-Administered Medications   Medication     acetaminophen (TYLENOL) tablet 650 mg     albuterol (PROAIR HFA/PROVENTIL HFA/VENTOLIN HFA) 108 (90 Base) MCG/ACT inhaler 1-2 puff     alum & mag hydroxide-simethicone (MAALOX) suspension 30 mL     aspirin EC tablet 81 mg     atorvastatin (LIPITOR) tablet 80 mg     benzocaine-menthol (CEPACOL) 15-3.6 MG lozenge 1 lozenge     bisacodyl (DULCOLAX) Suppository 10 mg     busPIRone (BUSPAR) tablet 10 mg     cholecalciferol (VITAMIN D3) 1250 mcg (10021 units) capsule 1,250 mcg     cloZAPine (CLOZARIL) tablet 250 mg     gabapentin (NEURONTIN) capsule 200 mg     haloperidol (HALDOL) tablet 1 mg     levothyroxine (SYNTHROID/LEVOTHROID) tablet 88 mcg     megestrol (MEGACE) tablet 20 mg     melatonin tablet 3 mg     memantine (NAMENDA) tablet 10 mg     [Held by provider] metoprolol tartrate (LOPRESSOR) tablet 25 mg     mirtazapine (REMERON) tablet 15 mg     nicotine (COMMIT) lozenge 2 mg     nystatin (MYCOSTATIN) suspension 500,000 Units     polyethylene glycol (MIRALAX) Packet 17 g     polyethylene glycol (MIRALAX) Packet 17 g     polyethylene  glycol-propylene glycol PF (SYSTANE ULTRA PF) opthalmic solution 1 drop     risperiDONE (risperDAL M-TABS) ODT tab 1 mg     salmeterol (SEREVENT) diskus inhaler 1 puff     senna-docusate (SENOKOT-S/PERICOLACE) 8.6-50 MG per tablet 2 tablet     Vitamin D3 (CHOLECALCIFEROL) tablet 50 mcg     Recent Results (from the past 168 hour(s))   Basic metabolic panel    Collection Time: 11/18/21 10:19 AM   Result Value Ref Range    Sodium 137 133 - 144 mmol/L    Potassium 4.1 3.4 - 5.3 mmol/L    Chloride 110 (H) 94 - 109 mmol/L    Carbon Dioxide (CO2) 21 20 - 32 mmol/L    Anion Gap 6 3 - 14 mmol/L    Urea Nitrogen 19 7 - 30 mg/dL    Creatinine 0.64 (L) 0.66 - 1.25 mg/dL    Calcium 9.4 8.5 - 10.1 mg/dL    Glucose 118 (H) 70 - 99 mg/dL    GFR Estimate >90 >60 mL/min/1.73m2   Magnesium    Collection Time: 11/18/21 10:19 AM   Result Value Ref Range    Magnesium 2.2 1.6 - 2.3 mg/dL   Phosphorus    Collection Time: 11/18/21 10:19 AM   Result Value Ref Range    Phosphorus 3.7 2.5 - 4.5 mg/dL   CBC with platelets and differential    Collection Time: 11/18/21 10:19 AM   Result Value Ref Range    WBC Count 9.1 4.0 - 11.0 10e3/uL    RBC Count 5.21 4.40 - 5.90 10e6/uL    Hemoglobin 16.0 13.3 - 17.7 g/dL    Hematocrit 50.9 40.0 - 53.0 %    MCV 98 78 - 100 fL    MCH 30.7 26.5 - 33.0 pg    MCHC 31.4 (L) 31.5 - 36.5 g/dL    RDW 13.2 10.0 - 15.0 %    Platelet Count 274 150 - 450 10e3/uL    % Neutrophils 77 %    % Lymphocytes 11 %    % Monocytes 7 %    % Eosinophils 4 %    % Basophils 1 %    % Immature Granulocytes 0 %    NRBCs per 100 WBC 0 <1 /100    Absolute Neutrophils 7.0 1.6 - 8.3 10e3/uL    Absolute Lymphocytes 1.0 0.8 - 5.3 10e3/uL    Absolute Monocytes 0.6 0.0 - 1.3 10e3/uL    Absolute Eosinophils 0.3 0.0 - 0.7 10e3/uL    Absolute Basophils 0.1 0.0 - 0.2 10e3/uL    Absolute Immature Granulocytes 0.0 <=0.0 10e3/uL    Absolute NRBCs 0.0 10e3/uL   Asymptomatic COVID-19 Virus (Coronavirus) by PCR Nasopharyngeal    Collection Time: 11/19/21  11:58 AM    Specimen: Nasopharyngeal; Swab   Result Value Ref Range    SARS CoV2 PCR Negative Negative   Glucose by meter    Collection Time: 11/20/21  8:26 PM   Result Value Ref Range    GLUCOSE BY METER POCT 101 (H) 70 - 99 mg/dL   Basic metabolic panel    Collection Time: 11/22/21 11:02 AM   Result Value Ref Range    Sodium 140 133 - 144 mmol/L    Potassium 3.4 3.4 - 5.3 mmol/L    Chloride 110 (H) 94 - 109 mmol/L    Carbon Dioxide (CO2) 24 20 - 32 mmol/L    Anion Gap 6 3 - 14 mmol/L    Urea Nitrogen 16 7 - 30 mg/dL    Creatinine 0.72 0.66 - 1.25 mg/dL    Calcium 9.4 8.5 - 10.1 mg/dL    Glucose 126 (H) 70 - 99 mg/dL    GFR Estimate >90 >60 mL/min/1.73m2   Magnesium    Collection Time: 11/22/21 11:02 AM   Result Value Ref Range    Magnesium 2.2 1.6 - 2.3 mg/dL   Phosphorus    Collection Time: 11/22/21 11:02 AM   Result Value Ref Range    Phosphorus 2.7 2.5 - 4.5 mg/dL

## 2021-11-23 NOTE — PLAN OF CARE
Assessment/Intervention/Current Symtoms and Care Coordination  -Met with team  -Reviewed chart  -Met with patient     Plan: awaiting [facility] guardianship outcomes related to this case.        Discharge Plan or Goal  Considerations include: medical unit; assessment and considerations are ongoing at this point.     Barriers to Discharge  Patient does not have a guardian. Efforts to get a guardian have lizandro unsuccessful.     Referral Status  None  Options:  Possible secured SNF for [facility] Consider [facility] in [facility] for male memory care opening. The intake person is Karina and referrals can be faxed to: [phone], her phone is [phone].     Case Management Services:  [facility]   Kurtis Chapin. Phone: [phone]  Supervisor is [email or web address]     Legal Status  Patient is under MI commitment in [facility]

## 2021-11-24 PROCEDURE — 250N000013 HC RX MED GY IP 250 OP 250 PS 637: Performed by: PSYCHIATRY & NEUROLOGY

## 2021-11-24 PROCEDURE — 250N000013 HC RX MED GY IP 250 OP 250 PS 637: Performed by: PHYSICIAN ASSISTANT

## 2021-11-24 PROCEDURE — 999N000147 HC STATISTIC PT IP EVAL DEFER: Performed by: PHYSICAL THERAPIST

## 2021-11-24 PROCEDURE — 124N000003 HC R&B MH SENIOR/ADOLESCENT

## 2021-11-24 RX ORDER — GABAPENTIN 100 MG/1
100 CAPSULE ORAL
Status: DISCONTINUED | OUTPATIENT
Start: 2021-11-24 | End: 2022-01-18 | Stop reason: HOSPADM

## 2021-11-24 RX ORDER — HALOPERIDOL 1 MG/1
1 TABLET ORAL AT BEDTIME
Status: DISCONTINUED | OUTPATIENT
Start: 2021-11-24 | End: 2022-01-18 | Stop reason: HOSPADM

## 2021-11-24 RX ORDER — CLOZAPINE 100 MG/1
200 TABLET ORAL AT BEDTIME
Status: DISCONTINUED | OUTPATIENT
Start: 2021-11-24 | End: 2022-01-18 | Stop reason: HOSPADM

## 2021-11-24 RX ADMIN — NYSTATIN 500000 UNITS: 100000 SUSPENSION ORAL at 12:07

## 2021-11-24 RX ADMIN — MEMANTINE 10 MG: 10 TABLET ORAL at 08:13

## 2021-11-24 RX ADMIN — BUSPIRONE HYDROCHLORIDE 10 MG: 10 TABLET ORAL at 13:29

## 2021-11-24 RX ADMIN — ASPIRIN 81 MG CHEWABLE TABLET 81 MG: 81 TABLET CHEWABLE at 08:13

## 2021-11-24 RX ADMIN — GABAPENTIN 100 MG: 100 CAPSULE ORAL at 16:45

## 2021-11-24 RX ADMIN — HALOPERIDOL 1 MG: 1 TABLET ORAL at 08:14

## 2021-11-24 RX ADMIN — NYSTATIN 500000 UNITS: 100000 SUSPENSION ORAL at 08:14

## 2021-11-24 RX ADMIN — MEGESTROL ACETATE 20 MG: 20 TABLET ORAL at 08:13

## 2021-11-24 RX ADMIN — HALOPERIDOL 1 MG: 1 TABLET ORAL at 20:17

## 2021-11-24 RX ADMIN — GABAPENTIN 200 MG: 100 CAPSULE ORAL at 12:02

## 2021-11-24 RX ADMIN — MEMANTINE 10 MG: 10 TABLET ORAL at 20:17

## 2021-11-24 RX ADMIN — MIRTAZAPINE 15 MG: 15 TABLET, FILM COATED ORAL at 20:17

## 2021-11-24 RX ADMIN — NYSTATIN 500000 UNITS: 100000 SUSPENSION ORAL at 16:45

## 2021-11-24 RX ADMIN — SALMETEROL XINAFOATE 1 PUFF: 50 POWDER, METERED ORAL; RESPIRATORY (INHALATION) at 20:18

## 2021-11-24 RX ADMIN — LEVOTHYROXINE SODIUM 88 MCG: 88 TABLET ORAL at 08:13

## 2021-11-24 RX ADMIN — GABAPENTIN 200 MG: 100 CAPSULE ORAL at 08:13

## 2021-11-24 RX ADMIN — SALMETEROL XINAFOATE 1 PUFF: 50 POWDER, METERED ORAL; RESPIRATORY (INHALATION) at 08:14

## 2021-11-24 RX ADMIN — CLOZAPINE 200 MG: 100 TABLET ORAL at 20:17

## 2021-11-24 RX ADMIN — NYSTATIN 500000 UNITS: 100000 SUSPENSION ORAL at 20:17

## 2021-11-24 RX ADMIN — BUSPIRONE HYDROCHLORIDE 10 MG: 10 TABLET ORAL at 08:14

## 2021-11-24 RX ADMIN — Medication 50 MCG: at 08:13

## 2021-11-24 RX ADMIN — BUSPIRONE HYDROCHLORIDE 10 MG: 10 TABLET ORAL at 20:17

## 2021-11-24 RX ADMIN — ATORVASTATIN CALCIUM 80 MG: 80 TABLET, FILM COATED ORAL at 20:17

## 2021-11-24 ASSESSMENT — ACTIVITIES OF DAILY LIVING (ADL)
HYGIENE/GROOMING: PROMPTS;WITH ASSISTANCE
HYGIENE/GROOMING: PROMPTS;WITH ASSISTANCE
DRESS: PROMPTS;WITH ASSISTANCE
ORAL_HYGIENE: PROMPTS;WITH ASSISTANCE
ORAL_HYGIENE: PROMPTS;WITH ASSISTANCE
DRESS: SCRUBS (BEHAVIORAL HEALTH);WITH ASSISTANCE
LAUNDRY: UNABLE TO COMPLETE
LAUNDRY: UNABLE TO COMPLETE

## 2021-11-24 NOTE — PLAN OF CARE
Problem: Behavioral Disturbance  Goal: Behavioral Disturbance  Description: Signs and symptoms of listed problems will be absent or manageable by discharge or transition of care.  Outcome: Improving     Received awake at 0300, heard clearing his throat.  Offered water, drank 200ml  Declined to take supplement offered to him.  Slept for 12.5 hours

## 2021-11-24 NOTE — PLAN OF CARE
"Pt declined to get out of bed all shift. Pt verbalized he is voiding, none witnessed. Pt declined skin assessment. Pt took all scheduled meds except inhaler and swish-n-spit. Pt oriented to person only. Pt ate 50% of mashed potatoes and 200 mL of ensure. Intake: 300 mL. Blunt/flat affect, mood irritable at times. Pt appears responding, pt told this writer \" (staff unable to understand as pt mumbled) said I could leave today\", pt turned his head and verbalized \"thanks  (staff unable to understand)\". Pt repositioning independently in bed.   "

## 2021-11-24 NOTE — PLAN OF CARE
Problem: Psychotic Symptoms  Goal: Psychotic Symptoms  Description: Signs and symptoms of listed problems will be absent or manageable.  Outcome: No Change     Problem: Cognitive Impairment (Psychotic Signs/Symptoms)  Goal: Optimal Cognitive Function (Psychotic Signs/Symptoms)  Outcome: No Change  Intervention: Support and Promote Cognitive Ability  Recent Flowsheet Documentation  Taken 11/24/2021 0900 by Juarez Murphy RN  Trust Relationship/Rapport:    care explained    questions answered    thoughts/feelings acknowledged     Problem: Malnutrition  Goal: Improved Nutritional Intake  Outcome: No Change     Patient calm, and pleasant. Patient alert to self only. Denies mental health illness. Poor judgement and insight noted. Patient reports feeling less tired today than previous days as his medications are being adjusted. Writer encouraged patient to get up and show how well he walks. Patient walked from bed to bathroom with SBA, patient declined to leave the room. Voided in toilet, urine franko color. Patient appearance is disheveled, refuses to perform ADLs. Intake encouraged patient drank about 600 cc of water and 60 ml of ensure. Only ate a brownie from lunch tray. Patient refused afternoon lab draw, staff will try again this evening. No pain noted, no other concerns.

## 2021-11-24 NOTE — PROGRESS NOTES
Patient seen, chart reviewed, care discussed with staff.  Blood pressure 92/66, pulse 59, temperature 99  F (37.2  C), temperature source Temporal, resp. rate 16, height 1.829 m (6'), weight 77.6 kg (171 lb 1.6 oz), SpO2 96 %.    By report, more compliant and less irritable.  Intake still poor    General appearance: discheveled, but   Very sleepy  Affect: fair  Mood: fair    Speech: none.   Eye contact:  low    Psychomotor behavior: decreased  Gait: ok by report  Abnormal movements: has TD  Delusions: could not assess  Hallucinations:  Could not assess  Thoughts: could not assess  Associations: could not assess  Judgement: poor  Insight:low  Cognitions and memory impaired  Orientation: self  Not suicidal.    Imp: Schizophrenia  2.  Major neurocognitive    Plan: Decrease Clozaril to 200mg, Gabapentin to 100mg TID, and Haldol to one mg at hs only  2.  Re-check Ammonia (was 51 in May)    Current Facility-Administered Medications   Medication     acetaminophen (TYLENOL) tablet 650 mg     albuterol (PROAIR HFA/PROVENTIL HFA/VENTOLIN HFA) 108 (90 Base) MCG/ACT inhaler 1-2 puff     alum & mag hydroxide-simethicone (MAALOX) suspension 30 mL     aspirin EC tablet 81 mg     atorvastatin (LIPITOR) tablet 80 mg     benzocaine-menthol (CEPACOL) 15-3.6 MG lozenge 1 lozenge     bisacodyl (DULCOLAX) Suppository 10 mg     busPIRone (BUSPAR) tablet 10 mg     cholecalciferol (VITAMIN D3) 1250 mcg (97998 units) capsule 1,250 mcg     cloZAPine (CLOZARIL) tablet 200 mg     gabapentin (NEURONTIN) capsule 100 mg     haloperidol (HALDOL) tablet 1 mg     levothyroxine (SYNTHROID/LEVOTHROID) tablet 88 mcg     megestrol (MEGACE) tablet 20 mg     melatonin tablet 3 mg     memantine (NAMENDA) tablet 10 mg     [Held by provider] metoprolol tartrate (LOPRESSOR) tablet 25 mg     mirtazapine (REMERON) tablet 15 mg     nicotine (COMMIT) lozenge 2 mg     nystatin (MYCOSTATIN) suspension 500,000 Units     polyethylene glycol (MIRALAX) Packet 17  g     polyethylene glycol (MIRALAX) Packet 17 g     polyethylene glycol-propylene glycol PF (SYSTANE ULTRA PF) opthalmic solution 1 drop     risperiDONE (risperDAL M-TABS) ODT tab 1 mg     salmeterol (SEREVENT) diskus inhaler 1 puff     senna-docusate (SENOKOT-S/PERICOLACE) 8.6-50 MG per tablet 2 tablet     Vitamin D3 (CHOLECALCIFEROL) tablet 50 mcg     Recent Results (from the past 168 hour(s))   Basic metabolic panel    Collection Time: 11/18/21 10:19 AM   Result Value Ref Range    Sodium 137 133 - 144 mmol/L    Potassium 4.1 3.4 - 5.3 mmol/L    Chloride 110 (H) 94 - 109 mmol/L    Carbon Dioxide (CO2) 21 20 - 32 mmol/L    Anion Gap 6 3 - 14 mmol/L    Urea Nitrogen 19 7 - 30 mg/dL    Creatinine 0.64 (L) 0.66 - 1.25 mg/dL    Calcium 9.4 8.5 - 10.1 mg/dL    Glucose 118 (H) 70 - 99 mg/dL    GFR Estimate >90 >60 mL/min/1.73m2   Magnesium    Collection Time: 11/18/21 10:19 AM   Result Value Ref Range    Magnesium 2.2 1.6 - 2.3 mg/dL   Phosphorus    Collection Time: 11/18/21 10:19 AM   Result Value Ref Range    Phosphorus 3.7 2.5 - 4.5 mg/dL   CBC with platelets and differential    Collection Time: 11/18/21 10:19 AM   Result Value Ref Range    WBC Count 9.1 4.0 - 11.0 10e3/uL    RBC Count 5.21 4.40 - 5.90 10e6/uL    Hemoglobin 16.0 13.3 - 17.7 g/dL    Hematocrit 50.9 40.0 - 53.0 %    MCV 98 78 - 100 fL    MCH 30.7 26.5 - 33.0 pg    MCHC 31.4 (L) 31.5 - 36.5 g/dL    RDW 13.2 10.0 - 15.0 %    Platelet Count 274 150 - 450 10e3/uL    % Neutrophils 77 %    % Lymphocytes 11 %    % Monocytes 7 %    % Eosinophils 4 %    % Basophils 1 %    % Immature Granulocytes 0 %    NRBCs per 100 WBC 0 <1 /100    Absolute Neutrophils 7.0 1.6 - 8.3 10e3/uL    Absolute Lymphocytes 1.0 0.8 - 5.3 10e3/uL    Absolute Monocytes 0.6 0.0 - 1.3 10e3/uL    Absolute Eosinophils 0.3 0.0 - 0.7 10e3/uL    Absolute Basophils 0.1 0.0 - 0.2 10e3/uL    Absolute Immature Granulocytes 0.0 <=0.0 10e3/uL    Absolute NRBCs 0.0 10e3/uL   Asymptomatic COVID-19 Virus  (Coronavirus) by PCR Nasopharyngeal    Collection Time: 11/19/21 11:58 AM    Specimen: Nasopharyngeal; Swab   Result Value Ref Range    SARS CoV2 PCR Negative Negative   Glucose by meter    Collection Time: 11/20/21  8:26 PM   Result Value Ref Range    GLUCOSE BY METER POCT 101 (H) 70 - 99 mg/dL   Basic metabolic panel    Collection Time: 11/22/21 11:02 AM   Result Value Ref Range    Sodium 140 133 - 144 mmol/L    Potassium 3.4 3.4 - 5.3 mmol/L    Chloride 110 (H) 94 - 109 mmol/L    Carbon Dioxide (CO2) 24 20 - 32 mmol/L    Anion Gap 6 3 - 14 mmol/L    Urea Nitrogen 16 7 - 30 mg/dL    Creatinine 0.72 0.66 - 1.25 mg/dL    Calcium 9.4 8.5 - 10.1 mg/dL    Glucose 126 (H) 70 - 99 mg/dL    GFR Estimate >90 >60 mL/min/1.73m2   Magnesium    Collection Time: 11/22/21 11:02 AM   Result Value Ref Range    Magnesium 2.2 1.6 - 2.3 mg/dL   Phosphorus    Collection Time: 11/22/21 11:02 AM   Result Value Ref Range    Phosphorus 2.7 2.5 - 4.5 mg/dL

## 2021-11-25 LAB
AMMONIA PLAS-SCNC: 31 UMOL/L (ref 10–50)
ANION GAP SERPL CALCULATED.3IONS-SCNC: 5 MMOL/L (ref 3–14)
BASOPHILS # BLD AUTO: 0 10E3/UL (ref 0–0.2)
BASOPHILS NFR BLD AUTO: 0 %
BUN SERPL-MCNC: 17 MG/DL (ref 7–30)
CALCIUM SERPL-MCNC: 9 MG/DL (ref 8.5–10.1)
CHLORIDE BLD-SCNC: 111 MMOL/L (ref 94–109)
CO2 SERPL-SCNC: 22 MMOL/L (ref 20–32)
CREAT SERPL-MCNC: 0.66 MG/DL (ref 0.66–1.25)
EOSINOPHIL # BLD AUTO: 0.4 10E3/UL (ref 0–0.7)
EOSINOPHIL NFR BLD AUTO: 4 %
ERYTHROCYTE [DISTWIDTH] IN BLOOD BY AUTOMATED COUNT: 13.6 % (ref 10–15)
GFR SERPL CREATININE-BSD FRML MDRD: >90 ML/MIN/1.73M2
GLUCOSE BLD-MCNC: 138 MG/DL (ref 70–99)
HCT VFR BLD AUTO: 42.8 % (ref 40–53)
HGB BLD-MCNC: 14.5 G/DL (ref 13.3–17.7)
IMM GRANULOCYTES # BLD: 0 10E3/UL
IMM GRANULOCYTES NFR BLD: 0 %
LYMPHOCYTES # BLD AUTO: 1.2 10E3/UL (ref 0.8–5.3)
LYMPHOCYTES NFR BLD AUTO: 12 %
MAGNESIUM SERPL-MCNC: 2 MG/DL (ref 1.6–2.3)
MCH RBC QN AUTO: 30.3 PG (ref 26.5–33)
MCHC RBC AUTO-ENTMCNC: 33.9 G/DL (ref 31.5–36.5)
MCV RBC AUTO: 90 FL (ref 78–100)
MONOCYTES # BLD AUTO: 0.6 10E3/UL (ref 0–1.3)
MONOCYTES NFR BLD AUTO: 6 %
NEUTROPHILS # BLD AUTO: 7.6 10E3/UL (ref 1.6–8.3)
NEUTROPHILS NFR BLD AUTO: 78 %
NRBC # BLD AUTO: 0 10E3/UL
NRBC BLD AUTO-RTO: 0 /100
PHOSPHATE SERPL-MCNC: 3.2 MG/DL (ref 2.5–4.5)
PLATELET # BLD AUTO: 226 10E3/UL (ref 150–450)
POTASSIUM BLD-SCNC: 3.6 MMOL/L (ref 3.4–5.3)
RBC # BLD AUTO: 4.78 10E6/UL (ref 4.4–5.9)
SODIUM SERPL-SCNC: 138 MMOL/L (ref 133–144)
WBC # BLD AUTO: 9.8 10E3/UL (ref 4–11)

## 2021-11-25 PROCEDURE — 250N000013 HC RX MED GY IP 250 OP 250 PS 637: Performed by: PSYCHIATRY & NEUROLOGY

## 2021-11-25 PROCEDURE — 80048 BASIC METABOLIC PNL TOTAL CA: CPT | Performed by: PHYSICIAN ASSISTANT

## 2021-11-25 PROCEDURE — 84100 ASSAY OF PHOSPHORUS: CPT | Performed by: PHYSICIAN ASSISTANT

## 2021-11-25 PROCEDURE — 82140 ASSAY OF AMMONIA: CPT | Performed by: PSYCHIATRY & NEUROLOGY

## 2021-11-25 PROCEDURE — 124N000003 HC R&B MH SENIOR/ADOLESCENT

## 2021-11-25 PROCEDURE — 83735 ASSAY OF MAGNESIUM: CPT | Performed by: PHYSICIAN ASSISTANT

## 2021-11-25 PROCEDURE — 85025 COMPLETE CBC W/AUTO DIFF WBC: CPT | Performed by: PSYCHIATRY & NEUROLOGY

## 2021-11-25 PROCEDURE — 36415 COLL VENOUS BLD VENIPUNCTURE: CPT | Performed by: PHYSICIAN ASSISTANT

## 2021-11-25 PROCEDURE — 250N000013 HC RX MED GY IP 250 OP 250 PS 637: Performed by: PHYSICIAN ASSISTANT

## 2021-11-25 RX ADMIN — BUSPIRONE HYDROCHLORIDE 10 MG: 10 TABLET ORAL at 19:54

## 2021-11-25 RX ADMIN — CHOLECALCIFEROL CAP 1.25 MG (50000 UNIT) 1250 MCG: 1.25 CAP at 11:49

## 2021-11-25 RX ADMIN — MEMANTINE 10 MG: 10 TABLET ORAL at 09:06

## 2021-11-25 RX ADMIN — GABAPENTIN 100 MG: 100 CAPSULE ORAL at 11:55

## 2021-11-25 RX ADMIN — NYSTATIN 500000 UNITS: 100000 SUSPENSION ORAL at 09:05

## 2021-11-25 RX ADMIN — GABAPENTIN 100 MG: 100 CAPSULE ORAL at 18:46

## 2021-11-25 RX ADMIN — BUSPIRONE HYDROCHLORIDE 10 MG: 10 TABLET ORAL at 13:49

## 2021-11-25 RX ADMIN — HALOPERIDOL 1 MG: 1 TABLET ORAL at 19:54

## 2021-11-25 RX ADMIN — GABAPENTIN 100 MG: 100 CAPSULE ORAL at 09:06

## 2021-11-25 RX ADMIN — SALMETEROL XINAFOATE 1 PUFF: 50 POWDER, METERED ORAL; RESPIRATORY (INHALATION) at 19:26

## 2021-11-25 RX ADMIN — LEVOTHYROXINE SODIUM 88 MCG: 88 TABLET ORAL at 09:06

## 2021-11-25 RX ADMIN — SALMETEROL XINAFOATE 1 PUFF: 50 POWDER, METERED ORAL; RESPIRATORY (INHALATION) at 09:06

## 2021-11-25 RX ADMIN — MIRTAZAPINE 15 MG: 15 TABLET, FILM COATED ORAL at 19:54

## 2021-11-25 RX ADMIN — MEMANTINE 10 MG: 10 TABLET ORAL at 19:54

## 2021-11-25 RX ADMIN — CLOZAPINE 200 MG: 100 TABLET ORAL at 19:53

## 2021-11-25 RX ADMIN — MEGESTROL ACETATE 20 MG: 20 TABLET ORAL at 09:06

## 2021-11-25 RX ADMIN — ATORVASTATIN CALCIUM 80 MG: 80 TABLET, FILM COATED ORAL at 19:53

## 2021-11-25 RX ADMIN — ASPIRIN 81 MG CHEWABLE TABLET 81 MG: 81 TABLET CHEWABLE at 09:05

## 2021-11-25 RX ADMIN — BUSPIRONE HYDROCHLORIDE 10 MG: 10 TABLET ORAL at 09:06

## 2021-11-25 ASSESSMENT — ACTIVITIES OF DAILY LIVING (ADL)
HYGIENE/GROOMING: PROMPTS
ORAL_HYGIENE: PROMPTS
ORAL_HYGIENE: PROMPTS
DRESS: PROMPTS
HYGIENE/GROOMING: PROMPTS
LAUNDRY: UNABLE TO COMPLETE
DRESS: PROMPTS

## 2021-11-25 NOTE — PLAN OF CARE
Assessment/Intervention/Current Symtoms and Care Coordination  -Met with team  -Reviewed chart  -Met with patient     Plan: awaiting [facility] guardianship outcomes related to this case.        Discharge Plan or Goal  Considerations include: medical unit; assessment and considerations are ongoing at this point.     Barriers to Discharge  Patient does not have a guardian. Efforts to get a guardian have lizandro unsuccessful.     Referral Status  None  Options:  Possible secured SNF for [facility]. Consider [place] in [facility] for male memory care opening. The intake person is Karina and referrals can be faxed to: [phone], her phone is [phone].     Case Management Services:  [facility]   Kurtis Chapin. Phone: [phone]  Supervisor is [email or web address]     Legal Status  Patient is under MI commitment in [facility]

## 2021-11-25 NOTE — PLAN OF CARE
"  Problem: Behavioral Disturbance  Goal: Behavioral Disturbance  Description: Signs and symptoms of listed problems will be absent or manageable by discharge or transition of care.  Outcome: No Change  Flowsheets (Taken 11/25/2021 0939)  Behavioral Disturbance Assessed: all  Behavioral Disturbance Present:    mood    insight    orientation    thought process    affect    psychomotor activity     Problem: Malnutrition  Goal: Improved Nutritional Intake  Outcome: No Change     Problem: Fall Injury Risk  Goal: Absence of Fall and Fall-Related Injury  Outcome: No Change      Pt was assessed in his bedroom this morning. Appearance was disheveled, hair was long and greasy. Speech was slow and slightly slurred, which is baseline. Pt is alert and oriented to self only. Pt asked what hospital and what the day was when he could not figure it out.  When asked to join the lounge area for breakfast, pt stood up, took a few step, and then stated \"I want to lay down,\" and proceeded to lay down in bed again.  Pt is independently positioning with verbal prompts.  Pt was asked to come out for a snack and medication at 2pm. He came out although he asked to go back to his room. Writer asked him to finish his apple sauce, ensure drink, and take his medication which he did complete by 3pm. Pt is sitting in the lounge at time of entry.    Appetite = Poor  No gatorade at breakfast. Contacted Dietary, they will send it up with all meals now.     11/25/21 1000 11/25/21 1400   Intake (mL)   P.O. 120 mL  (orange juice)   (1/2 apple juice (small red box))   Intake (%) 25%  (3 bites applesauce, a few bites of eggs and sausage) 25%  (few bites chx tenders; 50% magic edgardo & 50% apple juice)   Appetite Poor Poor   Carbohydrate Intake (gm) 14 gm  (14 gm for orange juice) 26 gm   Snacks/Supplements Adult: Magic Cup; With Meals   Volume (mL) Supplement  --  80 mL   Snacks/Supplements Adult: Ensure Enlive; With Meals   Volume (mL) Supplement  --  240 " mL  (ensure vanilla)       Output = There is a hat in pt's toilet. Writer had pt use bathroom and pt did not urinate as of yet.    Sleep = pt reported adequate sleep with no disruptions    ADLs = Pt declined a shower. Prompted pt to change into new scrubs, but he did not yet.    Medication side effects = None observed. Pt took medications with apple sauce and a sip of water this morning.    Vital signs     11/25/21 0842   Vital Signs   Temp 97.1  F (36.2  C)   Temp src Temporal   Pulse 66   Pulse Rate Source Monitor   /76   Lying Orthostatic BP   Lying Orthostatic /76   Lying Orthostatic Pulse 66 bpm   Oxygen Therapy   SpO2 98 %   Pain/Comfort   Patient Currently in Pain denies     Plan  Continue with plan of care. Push fluids, encourage ambulation. When asking pt to join the milieu, tell him it's time to get his medication or get his snack, which he followed those directives today.

## 2021-11-25 NOTE — PLAN OF CARE
"Patient has been laying in bed all shift. He was encouraged to walk but refused stating \"I'm too tired, I just want to sleep\".His affect is flat. His mood is calm. He denies SI and SIB. He denies all MH symptoms.He is alert to self only.  He is not social with peers or staff. This writer attempted to get him to eat but he stated \"I'm not hungry\". He did eat bites of his food. Oral intake is adequate=see flow sheet. Hat placed in toilet but patient voids around it. He is disheveled but states he took a shower 2 days ago. His nails are long and dirty but refuses to have staff cut or clean them. He refused to attend groups. He did not come out of his room. His speech is slow and delayed and mumbles. He denies pain. He is medication compliant. He voices no concerns. He is pleasant and cooperative saying \"thank you\" often.   "

## 2021-11-25 NOTE — PLAN OF CARE
"Assessment/Intervention/Current   -Reviewed chart  -Met with patient: Pt was able to stand up and take a walk from her room to the visiting room near the unit [facility] station. Pt was observed to have an unsteady gait while he walked. He was able to ambulate without help. He ate some of his meals while in the room with this writer, but refused others. During this meeting, pt reported he wants to go home. When asked where is home, he reported it to be [address] [place] [address]. When this writer asked other things he needs, pt responded \"I want alcohol.\" Some of the words used by the patient were incomprehensible and this writer was not able to make sense of them.          Plan:   1. Made an agreement with the patient to take a similar walk in the [facility], tomorrow. Plan is for pt to have/eat his lunch in the same place.  2. Awaiting [facility] guardianship outcomes related to this case.        Discharge Plan or Goal  Considerations include: medical unit; assessment and considerations are ongoing at this point.     Barriers to Discharge  Patient does not have a guardian. Efforts to get a guardian have lizandro unsuccessful.     Referral Status  None  Options:  Possible secured SNF for [facility] Consider [place] in [facility] for male memory care opening. The intake person is Karina and referrals can be faxed to: [phone], her phone is [phone].     Case Management Services:  [facility]   Kurtis Chapin. Phone: [phone]  Supervisor is [email or web address]     Legal Status  Patient is under MI commitment in [facility]  "

## 2021-11-25 NOTE — PLAN OF CARE
Patient has remained in his room sleeping overnight.  Declined to use the restroom when reminded.  Observed repositioning himself throughout the night. Continent of urine this AM.  Patient slept for 8.5 hrs.

## 2021-11-26 ENCOUNTER — APPOINTMENT (OUTPATIENT)
Dept: PHYSICAL THERAPY | Facility: CLINIC | Age: 56
DRG: 885 | End: 2021-11-26
Attending: PSYCHIATRY & NEUROLOGY
Payer: COMMERCIAL

## 2021-11-26 PROCEDURE — 124N000003 HC R&B MH SENIOR/ADOLESCENT

## 2021-11-26 PROCEDURE — 250N000013 HC RX MED GY IP 250 OP 250 PS 637: Performed by: PSYCHIATRY & NEUROLOGY

## 2021-11-26 RX ADMIN — BUSPIRONE HYDROCHLORIDE 10 MG: 10 TABLET ORAL at 08:10

## 2021-11-26 RX ADMIN — GABAPENTIN 100 MG: 100 CAPSULE ORAL at 12:01

## 2021-11-26 RX ADMIN — BUSPIRONE HYDROCHLORIDE 10 MG: 10 TABLET ORAL at 19:50

## 2021-11-26 RX ADMIN — MEGESTROL ACETATE 20 MG: 20 TABLET ORAL at 08:09

## 2021-11-26 RX ADMIN — ATORVASTATIN CALCIUM 80 MG: 80 TABLET, FILM COATED ORAL at 19:49

## 2021-11-26 RX ADMIN — BUSPIRONE HYDROCHLORIDE 10 MG: 10 TABLET ORAL at 13:35

## 2021-11-26 RX ADMIN — ASPIRIN 81 MG CHEWABLE TABLET 81 MG: 81 TABLET CHEWABLE at 08:09

## 2021-11-26 RX ADMIN — GABAPENTIN 100 MG: 100 CAPSULE ORAL at 08:10

## 2021-11-26 RX ADMIN — GABAPENTIN 100 MG: 100 CAPSULE ORAL at 17:19

## 2021-11-26 RX ADMIN — SALMETEROL XINAFOATE 1 PUFF: 50 POWDER, METERED ORAL; RESPIRATORY (INHALATION) at 08:10

## 2021-11-26 RX ADMIN — MEMANTINE 10 MG: 10 TABLET ORAL at 08:10

## 2021-11-26 RX ADMIN — CLOZAPINE 200 MG: 100 TABLET ORAL at 19:49

## 2021-11-26 RX ADMIN — MIRTAZAPINE 15 MG: 15 TABLET, FILM COATED ORAL at 19:50

## 2021-11-26 RX ADMIN — SALMETEROL XINAFOATE 1 PUFF: 50 POWDER, METERED ORAL; RESPIRATORY (INHALATION) at 19:52

## 2021-11-26 RX ADMIN — LEVOTHYROXINE SODIUM 88 MCG: 88 TABLET ORAL at 08:09

## 2021-11-26 RX ADMIN — MEMANTINE 10 MG: 10 TABLET ORAL at 19:50

## 2021-11-26 RX ADMIN — HALOPERIDOL 1 MG: 1 TABLET ORAL at 19:50

## 2021-11-26 ASSESSMENT — ACTIVITIES OF DAILY LIVING (ADL)
LAUNDRY: UNABLE TO COMPLETE
DRESS: PROMPTS
HYGIENE/GROOMING: PROMPTS
ORAL_HYGIENE: PROMPTS
HYGIENE/GROOMING: PROMPTS
ORAL_HYGIENE: PROMPTS
DRESS: PROMPTS

## 2021-11-26 NOTE — PLAN OF CARE
Problem: Sleep Disturbance  Goal: Adequate Sleep/Rest  Outcome: No Change     Problem: Cognitive Impairment (Psychotic Signs/Symptoms)  Goal: Optimal Cognitive Function (Psychotic Signs/Symptoms)  Outcome: No Change  Intervention: Support and Promote Cognitive Ability  Recent Flowsheet Documentation  Taken 11/26/2021 1100 by Juarez Murphy RN  Trust Relationship/Rapport: care explained     Problem: Psychotic Symptoms  Goal: Psychotic Symptoms  Description: Signs and symptoms of listed problems will be absent or manageable.  Outcome: Improving  Goal: Social and Therapeutic (Psychotic Symptoms)  Description: Signs and symptoms of listed problems will be absent or manageable.  Outcome: Improving     Patient alert to self, poor insight and judgement noted. Patient reports to staff he wont be here tomorrow, writer asked where are you going to be? Patient states I am going home. This morning patient walked to the milieu and spent 1 hour in milieu per staff request. Patient seems less irritable today but mood is still labile. fair intake this shift, patient did not eat breakfast but ate about 75% of lunch in the milieu. Patient seems more agreeable to prompts to leave room for meals, staff will continue to strongly encourage this behavior and positively reinforce it.  Patient drank 420cc of fluids this shift, output is undetermined at this time. Hat is empty but patient says he voided in toilet today.   FYI Clozapine found in patients room this morning, Monitor patient for cheeking.  Order for covid swab came in ~1400 writer unable to complete this shift.

## 2021-11-26 NOTE — PLAN OF CARE
Patient refused any activity at all tonight.  Sat on the side of the bed and was up to the bathroom once and would not change his pull ups and said that they were dry.  Refused dinner but ate 1/2 container of applesauce, and approx 100cc of liquid.  Urine is dark franko, clear and odorless.  Voided only 150cc.  P:  continue same plan of care.  Needs much encouragement to eat, drink and be more avtive

## 2021-11-26 NOTE — PLAN OF CARE
Problem: Sleep Disturbance  Goal: Adequate Sleep/Rest  Outcome: No Change     Slept well tonight for 13.5 hours  Pt able to reposition self in bed  Voided 160 ml of dark yellow , concentrated urine  Offered fluids, drank 200 ml of water  O2 sats 95 % at room air.

## 2021-11-26 NOTE — PLAN OF CARE
BEHAVIORAL TEAM DISCUSSION     Participants: Wilton Morfin MD; Joi Stearns, OT;Juarez Murphy, RN; COLETT[facility];      Progress: Pt was transferred from [facility]. Pt is appearing to respond to some treatment in the context of accepting some food, engaging in some minimal discussion and has come out to the [facility] area x2, this week. He continues to have poor activities of daily living and needing assistance completing most tasks.      Anticipated length of stay:    one week - may be transferred to medicine.     Continued Stay Criteria/Rationale:   The pt is not able to care for himself        Medical/Physical: See medical for details  Needs 2 staff to transfer  Staff need to feed the pt     Precautions:           Behavioral Orders   Procedures     Assault precautions     Cheeking Precautions (behavioral units)       Patient Observed swallowing PO medications; Patient asked to drink water after swallowing medication; Patient in Staff line of sight for 15 minutes after medication given; Mouth checks after PO administration (patient asked to open mouth and stick out their tongue).     Code 1     Code 2       With Security for any imaging/testing needed.     Elopement precautions     Fall precautions     Routine Programming       As clinically indicated     Single Room     Status 15       Every 15 minutes.      Imp: Schizophrenia and major neurocognitive  Plan:   Help pt eat  Medication Management  Ethics consultations as needed  Medications:     Current Facility-Administered Medications   Medication     acetaminophen (TYLENOL) tablet 650 mg     albuterol (PROAIR HFA/PROVENTIL HFA/VENTOLIN HFA) 108 (90 Base) MCG/ACT inhaler 1-2 puff     alum & mag hydroxide-simethicone (MAALOX) suspension 30 mL     aspirin EC tablet 81 mg     atorvastatin (LIPITOR) tablet 80 mg     benzocaine-menthol (CEPACOL) 15-3.6 MG lozenge 1 lozenge     bisacodyl (DULCOLAX) Suppository 10 mg     busPIRone (BUSPAR) tablet 10 mg      cholecalciferol (VITAMIN D3) 1250 mcg (69647 units) capsule 1,250 mcg     cloZAPine (CLOZARIL) tablet 200 mg     gabapentin (NEURONTIN) capsule 100 mg     haloperidol (HALDOL) tablet 1 mg     levothyroxine (SYNTHROID/LEVOTHROID) tablet 88 mcg     megestrol (MEGACE) tablet 20 mg     melatonin tablet 3 mg     memantine (NAMENDA) tablet 10 mg     [Held by provider] metoprolol tartrate (LOPRESSOR) tablet 25 mg     mirtazapine (REMERON) tablet 15 mg     nicotine (COMMIT) lozenge 2 mg     polyethylene glycol (MIRALAX) Packet 17 g     polyethylene glycol-propylene glycol PF (SYSTANE ULTRA PF) opthalmic solution 1 drop     risperiDONE (risperDAL M-TABS) ODT tab 1 mg     salmeterol (SEREVENT) diskus inhaler 1 puff     senna-docusate (SENOKOT-S/PERICOLACE) 8.6-50 MG per tablet 2 tablet           Pt will be encouraged to engaged in the therapeutic milieu  Pt will be encouraged to attend groups to learn and practice positive skills to cope with his symptoms  Medical consultations will be implemented as needed  Care coordination will be implemented.

## 2021-11-26 NOTE — PROGRESS NOTES
Patient seen, chart reviewed, care discussed with staff.  Blood pressure 110/81, pulse 111, temperature (!) 96.2  F (35.7  C), resp. rate 16, height 1.829 m (6'), weight 77.6 kg (171 lb 1.6 oz), SpO2 95 %.    By report, able to leave room and less irritable  Slept 13 hours  General appearance: discheveled  Alert.   Affect: fair  Mood: fair    Speech:  Decreased, dysarthric   Eye contact:  good.    Psychomotor behavior: decreased, in bed  Gait: safe by report   Abnormal movements: TD same  Delusions: not able to assess  Hallucinations:  He notes he is talking to both myself and his mother  Thoughts: concrete  Associations: intact  Judgement: poor  Insight: poor  Cognitions and memory impaired  Orientation: self   Not suicidal.     Imp: Schizophrenia and major neurocognitive  Plan:  Same meds today    Current Facility-Administered Medications   Medication     acetaminophen (TYLENOL) tablet 650 mg     albuterol (PROAIR HFA/PROVENTIL HFA/VENTOLIN HFA) 108 (90 Base) MCG/ACT inhaler 1-2 puff     alum & mag hydroxide-simethicone (MAALOX) suspension 30 mL     aspirin EC tablet 81 mg     atorvastatin (LIPITOR) tablet 80 mg     benzocaine-menthol (CEPACOL) 15-3.6 MG lozenge 1 lozenge     bisacodyl (DULCOLAX) Suppository 10 mg     busPIRone (BUSPAR) tablet 10 mg     cholecalciferol (VITAMIN D3) 1250 mcg (99040 units) capsule 1,250 mcg     cloZAPine (CLOZARIL) tablet 200 mg     gabapentin (NEURONTIN) capsule 100 mg     haloperidol (HALDOL) tablet 1 mg     levothyroxine (SYNTHROID/LEVOTHROID) tablet 88 mcg     megestrol (MEGACE) tablet 20 mg     melatonin tablet 3 mg     memantine (NAMENDA) tablet 10 mg     [Held by provider] metoprolol tartrate (LOPRESSOR) tablet 25 mg     mirtazapine (REMERON) tablet 15 mg     nicotine (COMMIT) lozenge 2 mg     polyethylene glycol (MIRALAX) Packet 17 g     polyethylene glycol-propylene glycol PF (SYSTANE ULTRA PF) opthalmic solution 1 drop     risperiDONE (risperDAL M-TABS) ODT tab 1 mg      salmeterol (SEREVENT) diskus inhaler 1 puff     senna-docusate (SENOKOT-S/PERICOLACE) 8.6-50 MG per tablet 2 tablet     Recent Results (from the past 168 hour(s))   Glucose by meter    Collection Time: 11/20/21  8:26 PM   Result Value Ref Range    GLUCOSE BY METER POCT 101 (H) 70 - 99 mg/dL   Basic metabolic panel    Collection Time: 11/22/21 11:02 AM   Result Value Ref Range    Sodium 140 133 - 144 mmol/L    Potassium 3.4 3.4 - 5.3 mmol/L    Chloride 110 (H) 94 - 109 mmol/L    Carbon Dioxide (CO2) 24 20 - 32 mmol/L    Anion Gap 6 3 - 14 mmol/L    Urea Nitrogen 16 7 - 30 mg/dL    Creatinine 0.72 0.66 - 1.25 mg/dL    Calcium 9.4 8.5 - 10.1 mg/dL    Glucose 126 (H) 70 - 99 mg/dL    GFR Estimate >90 >60 mL/min/1.73m2   Magnesium    Collection Time: 11/22/21 11:02 AM   Result Value Ref Range    Magnesium 2.2 1.6 - 2.3 mg/dL   Phosphorus    Collection Time: 11/22/21 11:02 AM   Result Value Ref Range    Phosphorus 2.7 2.5 - 4.5 mg/dL   Basic metabolic panel    Collection Time: 11/25/21  9:21 AM   Result Value Ref Range    Sodium 138 133 - 144 mmol/L    Potassium 3.6 3.4 - 5.3 mmol/L    Chloride 111 (H) 94 - 109 mmol/L    Carbon Dioxide (CO2) 22 20 - 32 mmol/L    Anion Gap 5 3 - 14 mmol/L    Urea Nitrogen 17 7 - 30 mg/dL    Creatinine 0.66 0.66 - 1.25 mg/dL    Calcium 9.0 8.5 - 10.1 mg/dL    Glucose 138 (H) 70 - 99 mg/dL    GFR Estimate >90 >60 mL/min/1.73m2   Magnesium    Collection Time: 11/25/21  9:21 AM   Result Value Ref Range    Magnesium 2.0 1.6 - 2.3 mg/dL   Phosphorus    Collection Time: 11/25/21  9:21 AM   Result Value Ref Range    Phosphorus 3.2 2.5 - 4.5 mg/dL   Ammonia    Collection Time: 11/25/21  9:21 AM   Result Value Ref Range    Ammonia 31 10 - 50 umol/L   CBC with platelets and differential    Collection Time: 11/25/21  9:21 AM   Result Value Ref Range    WBC Count 9.8 4.0 - 11.0 10e3/uL    RBC Count 4.78 4.40 - 5.90 10e6/uL    Hemoglobin 14.5 13.3 - 17.7 g/dL    Hematocrit 42.8 40.0 - 53.0 %    MCV 90  78 - 100 fL    MCH 30.3 26.5 - 33.0 pg    MCHC 33.9 31.5 - 36.5 g/dL    RDW 13.6 10.0 - 15.0 %    Platelet Count 226 150 - 450 10e3/uL    % Neutrophils 78 %    % Lymphocytes 12 %    % Monocytes 6 %    % Eosinophils 4 %    % Basophils 0 %    % Immature Granulocytes 0 %    NRBCs per 100 WBC 0 <1 /100    Absolute Neutrophils 7.6 1.6 - 8.3 10e3/uL    Absolute Lymphocytes 1.2 0.8 - 5.3 10e3/uL    Absolute Monocytes 0.6 0.0 - 1.3 10e3/uL    Absolute Eosinophils 0.4 0.0 - 0.7 10e3/uL    Absolute Basophils 0.0 0.0 - 0.2 10e3/uL    Absolute Immature Granulocytes 0.0 <=0.0 10e3/uL    Absolute NRBCs 0.0 10e3/uL

## 2021-11-27 PROCEDURE — 124N000003 HC R&B MH SENIOR/ADOLESCENT

## 2021-11-27 PROCEDURE — 250N000013 HC RX MED GY IP 250 OP 250 PS 637: Performed by: PSYCHIATRY & NEUROLOGY

## 2021-11-27 RX ADMIN — HALOPERIDOL 1 MG: 1 TABLET ORAL at 19:50

## 2021-11-27 RX ADMIN — BUSPIRONE HYDROCHLORIDE 10 MG: 10 TABLET ORAL at 08:27

## 2021-11-27 RX ADMIN — LEVOTHYROXINE SODIUM 88 MCG: 88 TABLET ORAL at 08:28

## 2021-11-27 RX ADMIN — MEMANTINE 10 MG: 10 TABLET ORAL at 08:27

## 2021-11-27 RX ADMIN — CLOZAPINE 200 MG: 100 TABLET ORAL at 19:49

## 2021-11-27 RX ADMIN — ASPIRIN 81 MG CHEWABLE TABLET 81 MG: 81 TABLET CHEWABLE at 08:28

## 2021-11-27 RX ADMIN — SALMETEROL XINAFOATE 1 PUFF: 50 POWDER, METERED ORAL; RESPIRATORY (INHALATION) at 19:47

## 2021-11-27 RX ADMIN — MEMANTINE 10 MG: 10 TABLET ORAL at 19:49

## 2021-11-27 RX ADMIN — BUSPIRONE HYDROCHLORIDE 10 MG: 10 TABLET ORAL at 13:07

## 2021-11-27 RX ADMIN — GABAPENTIN 100 MG: 100 CAPSULE ORAL at 12:51

## 2021-11-27 RX ADMIN — BUSPIRONE HYDROCHLORIDE 10 MG: 10 TABLET ORAL at 19:50

## 2021-11-27 RX ADMIN — ATORVASTATIN CALCIUM 80 MG: 80 TABLET, FILM COATED ORAL at 19:49

## 2021-11-27 RX ADMIN — MEGESTROL ACETATE 20 MG: 20 TABLET ORAL at 08:28

## 2021-11-27 RX ADMIN — GABAPENTIN 100 MG: 100 CAPSULE ORAL at 17:36

## 2021-11-27 RX ADMIN — GABAPENTIN 100 MG: 100 CAPSULE ORAL at 08:28

## 2021-11-27 RX ADMIN — SALMETEROL XINAFOATE 1 PUFF: 50 POWDER, METERED ORAL; RESPIRATORY (INHALATION) at 08:20

## 2021-11-27 RX ADMIN — MIRTAZAPINE 15 MG: 15 TABLET, FILM COATED ORAL at 19:49

## 2021-11-27 ASSESSMENT — ACTIVITIES OF DAILY LIVING (ADL)
HYGIENE/GROOMING: PROMPTS
HYGIENE/GROOMING: PROMPTS
DRESS: PROMPTS
ORAL_HYGIENE: PROMPTS
DRESS: PROMPTS
ORAL_HYGIENE: PROMPTS
LAUNDRY: UNABLE TO COMPLETE

## 2021-11-27 NOTE — PLAN OF CARE
"  Problem: Sleep Disturbance  Goal: Adequate Sleep/Rest  Outcome: Improving    Patient slept comfortably for 11 hours the whole night. Nothing unusual noted. No concerns raised up.    Patient came out of his room paced in the hallway. When asked by the staff what he is doing patient stated that   \"I am looking for my uncle who is stuck in the garage\".  Voided once to clear, franko-colored urine, 250 ml.     "

## 2021-11-27 NOTE — PLAN OF CARE
Patient isolates to his room and refuses most cares.  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  Patient did come down to the lounge for dinner and ate approx 25% of the meal.  Little unsteady on his feet.  Denies mental health symptoms.  Refuses further activity or asistance with ADL's.  Urine remains dark franko, clear and odorless.  Med compliant.   P:  Continue same plan of care.

## 2021-11-27 NOTE — PLAN OF CARE
Patient is withdrawn and isolative to room all shift. He continues to have poor oral intake, ate only 25% of his meals with a lot of encouragement. He was able to sit out in the lounge area for meals, he also showered with a lot of encouragement and assistance from senior psych associate. He appears confused and was seen talking and rambling to self. Pt reports voiding but no urine was observed in hat, was educated to not flush and use the hat when urinating.

## 2021-11-28 PROCEDURE — 124N000003 HC R&B MH SENIOR/ADOLESCENT

## 2021-11-28 PROCEDURE — 250N000013 HC RX MED GY IP 250 OP 250 PS 637: Performed by: PSYCHIATRY & NEUROLOGY

## 2021-11-28 RX ADMIN — LEVOTHYROXINE SODIUM 88 MCG: 88 TABLET ORAL at 08:50

## 2021-11-28 RX ADMIN — SALMETEROL XINAFOATE 1 PUFF: 50 POWDER, METERED ORAL; RESPIRATORY (INHALATION) at 21:44

## 2021-11-28 RX ADMIN — HALOPERIDOL 1 MG: 1 TABLET ORAL at 21:42

## 2021-11-28 RX ADMIN — GABAPENTIN 100 MG: 100 CAPSULE ORAL at 13:16

## 2021-11-28 RX ADMIN — BUSPIRONE HYDROCHLORIDE 10 MG: 10 TABLET ORAL at 13:16

## 2021-11-28 RX ADMIN — ASPIRIN 81 MG CHEWABLE TABLET 81 MG: 81 TABLET CHEWABLE at 08:51

## 2021-11-28 RX ADMIN — MEMANTINE 10 MG: 10 TABLET ORAL at 08:51

## 2021-11-28 RX ADMIN — CLOZAPINE 200 MG: 100 TABLET ORAL at 21:42

## 2021-11-28 RX ADMIN — MEMANTINE 10 MG: 10 TABLET ORAL at 21:42

## 2021-11-28 RX ADMIN — GABAPENTIN 100 MG: 100 CAPSULE ORAL at 17:30

## 2021-11-28 RX ADMIN — GABAPENTIN 100 MG: 100 CAPSULE ORAL at 08:51

## 2021-11-28 RX ADMIN — MEGESTROL ACETATE 20 MG: 20 TABLET ORAL at 08:50

## 2021-11-28 RX ADMIN — BUSPIRONE HYDROCHLORIDE 10 MG: 10 TABLET ORAL at 08:50

## 2021-11-28 RX ADMIN — BUSPIRONE HYDROCHLORIDE 10 MG: 10 TABLET ORAL at 21:42

## 2021-11-28 RX ADMIN — ATORVASTATIN CALCIUM 80 MG: 80 TABLET, FILM COATED ORAL at 21:41

## 2021-11-28 RX ADMIN — MIRTAZAPINE 15 MG: 15 TABLET, FILM COATED ORAL at 21:42

## 2021-11-28 ASSESSMENT — ACTIVITIES OF DAILY LIVING (ADL)
DRESS: PROMPTS;WITH ASSISTANCE
HYGIENE/GROOMING: PROMPTS
DRESS: PROMPTS
LAUNDRY: UNABLE TO COMPLETE
HYGIENE/GROOMING: PROMPTS
ORAL_HYGIENE: PROMPTS
ORAL_HYGIENE: PROMPTS

## 2021-11-28 NOTE — PLAN OF CARE
Problem: Sleep Disturbance  Goal: Adequate Sleep/Rest  Outcome: No Change     Slept 11 hours tonight, independent in repositioning in bed.  No intake or output this shift  O2 sta 95% at room air.

## 2021-11-28 NOTE — PLAN OF CARE
Patient remains isolative to his room but when told he had to sit in the lounge for an hour after eating, he did.  Oriented only to person and possibly knows that he is in a hospital.  Speech incoherent for the most part, very garbled. Answers usually yes and no to most questions and some he doesn't answer at all.  Urinating mostly in the toilet.  Had 250cc and ate only a few bites of his meal tonight.  Heart rate is 114-115, O2 sats 93%.     Recheck of heart rate remains 115-116, O2 sat 96%.  Encouraged greater fluid intake.  No measurable unine output tonight.  Patient urinated in the toilet at least twice.    P:  Continue to monitor I&O, vital signs.  Encourage more activity.

## 2021-11-28 NOTE — PLAN OF CARE
Patient continues to be withdrawn and isolative to room. He declined all encouragement to come out during meal times. He appeared very flat, depressed and has been refusing to eat and drink. He only had 320 ml of fluid intake for breakfast and 100 ml at lunch time, declined to eat or drink ensure. Pt's output was 150 ml, pt was also incontinent once during this shift. Encouraged pt to get out of bed and increase nutritional intake.

## 2021-11-29 ENCOUNTER — APPOINTMENT (OUTPATIENT)
Dept: PHYSICAL THERAPY | Facility: CLINIC | Age: 56
DRG: 885 | End: 2021-11-29
Attending: PSYCHIATRY & NEUROLOGY
Payer: COMMERCIAL

## 2021-11-29 LAB
ANION GAP SERPL CALCULATED.3IONS-SCNC: 9 MMOL/L (ref 3–14)
BUN SERPL-MCNC: 15 MG/DL (ref 7–30)
CALCIUM SERPL-MCNC: 9.1 MG/DL (ref 8.5–10.1)
CHLORIDE BLD-SCNC: 111 MMOL/L (ref 94–109)
CO2 SERPL-SCNC: 21 MMOL/L (ref 20–32)
CREAT SERPL-MCNC: 0.75 MG/DL (ref 0.66–1.25)
GFR SERPL CREATININE-BSD FRML MDRD: >90 ML/MIN/1.73M2
GLUCOSE BLD-MCNC: 147 MG/DL (ref 70–99)
MAGNESIUM SERPL-MCNC: 2.1 MG/DL (ref 1.6–2.3)
PHOSPHATE SERPL-MCNC: 3.3 MG/DL (ref 2.5–4.5)
POTASSIUM BLD-SCNC: 3.3 MMOL/L (ref 3.4–5.3)
SODIUM SERPL-SCNC: 141 MMOL/L (ref 133–144)

## 2021-11-29 PROCEDURE — 124N000003 HC R&B MH SENIOR/ADOLESCENT

## 2021-11-29 PROCEDURE — 83735 ASSAY OF MAGNESIUM: CPT | Performed by: PHYSICIAN ASSISTANT

## 2021-11-29 PROCEDURE — 250N000013 HC RX MED GY IP 250 OP 250 PS 637: Performed by: PSYCHIATRY & NEUROLOGY

## 2021-11-29 PROCEDURE — 97530 THERAPEUTIC ACTIVITIES: CPT | Mod: GP | Performed by: PHYSICAL THERAPIST

## 2021-11-29 PROCEDURE — 97116 GAIT TRAINING THERAPY: CPT | Mod: GP | Performed by: PHYSICAL THERAPIST

## 2021-11-29 PROCEDURE — 84100 ASSAY OF PHOSPHORUS: CPT | Performed by: PHYSICIAN ASSISTANT

## 2021-11-29 PROCEDURE — 99233 SBSQ HOSP IP/OBS HIGH 50: CPT | Performed by: PSYCHIATRY & NEUROLOGY

## 2021-11-29 PROCEDURE — 82310 ASSAY OF CALCIUM: CPT | Performed by: PHYSICIAN ASSISTANT

## 2021-11-29 PROCEDURE — 250N000013 HC RX MED GY IP 250 OP 250 PS 637: Performed by: NURSE PRACTITIONER

## 2021-11-29 PROCEDURE — 36415 COLL VENOUS BLD VENIPUNCTURE: CPT | Performed by: PHYSICIAN ASSISTANT

## 2021-11-29 RX ORDER — POTASSIUM CHLORIDE 750 MG/1
20 TABLET, EXTENDED RELEASE ORAL ONCE
Status: COMPLETED | OUTPATIENT
Start: 2021-11-29 | End: 2021-11-29

## 2021-11-29 RX ADMIN — SALMETEROL XINAFOATE 1 PUFF: 50 POWDER, METERED ORAL; RESPIRATORY (INHALATION) at 21:33

## 2021-11-29 RX ADMIN — ATORVASTATIN CALCIUM 80 MG: 80 TABLET, FILM COATED ORAL at 21:32

## 2021-11-29 RX ADMIN — BUSPIRONE HYDROCHLORIDE 10 MG: 10 TABLET ORAL at 08:51

## 2021-11-29 RX ADMIN — BUSPIRONE HYDROCHLORIDE 10 MG: 10 TABLET ORAL at 13:17

## 2021-11-29 RX ADMIN — MEGESTROL ACETATE 20 MG: 20 TABLET ORAL at 08:51

## 2021-11-29 RX ADMIN — GABAPENTIN 100 MG: 100 CAPSULE ORAL at 08:51

## 2021-11-29 RX ADMIN — MEMANTINE 10 MG: 10 TABLET ORAL at 08:51

## 2021-11-29 RX ADMIN — GABAPENTIN 100 MG: 100 CAPSULE ORAL at 13:17

## 2021-11-29 RX ADMIN — SALMETEROL XINAFOATE 1 PUFF: 50 POWDER, METERED ORAL; RESPIRATORY (INHALATION) at 08:51

## 2021-11-29 RX ADMIN — BUSPIRONE HYDROCHLORIDE 10 MG: 10 TABLET ORAL at 21:33

## 2021-11-29 RX ADMIN — GABAPENTIN 100 MG: 100 CAPSULE ORAL at 18:41

## 2021-11-29 RX ADMIN — CLOZAPINE 200 MG: 100 TABLET ORAL at 21:32

## 2021-11-29 RX ADMIN — HALOPERIDOL 1 MG: 1 TABLET ORAL at 21:31

## 2021-11-29 RX ADMIN — MEMANTINE 10 MG: 10 TABLET ORAL at 21:32

## 2021-11-29 RX ADMIN — LEVOTHYROXINE SODIUM 88 MCG: 88 TABLET ORAL at 08:51

## 2021-11-29 RX ADMIN — POTASSIUM CHLORIDE 20 MEQ: 750 TABLET, EXTENDED RELEASE ORAL at 13:17

## 2021-11-29 RX ADMIN — ASPIRIN 81 MG CHEWABLE TABLET 81 MG: 81 TABLET CHEWABLE at 08:51

## 2021-11-29 RX ADMIN — MIRTAZAPINE 15 MG: 15 TABLET, FILM COATED ORAL at 21:32

## 2021-11-29 ASSESSMENT — ACTIVITIES OF DAILY LIVING (ADL)
DRESS: PROMPTS
HYGIENE/GROOMING: PROMPTS
HYGIENE/GROOMING: PROMPTS
ORAL_HYGIENE: PROMPTS
LAUNDRY: UNABLE TO COMPLETE
DRESS: PROMPTS
ORAL_HYGIENE: PROMPTS
LAUNDRY: UNABLE TO COMPLETE

## 2021-11-29 NOTE — PLAN OF CARE
"Pt continues to be isolative to his room. Pt is oriented to self only.  Pt responds to some questions with mumbling speech and at other times he turns his head and does not respond.  Pt encouraged to ambulate the hallway - he declined several times, becoming agitated and loudly stating \"I am not going to, I am tired.\" Pt refused to ambulate to the lounge for dinner.  Pt declined all food from his meal tray - \"I am not hungry\".  Pt declines assistance and cares.  Pt drank  fluids when offered - see below.  Pt compliant with all scheduled medications.  He is changing position independently in bed.  BP 92/63, .    Pt declined covid swab.    Intake:  700 mL (1Two Cornel drink 240 mL, 40 mL Gatorade, 40 mL Magic Cup, 80 mL Ensure Clear, 300 mL water)    Output:  75 mL measured dark franko urine; remainder of urine was in the toilet.  Despite adjusting the toilet hat, urine misses and goes into the toilet.  "

## 2021-11-29 NOTE — PLAN OF CARE
"Assessment:  John \"Jose\" was up ad holley, spent almost the entire shift in his room, in bed, resting. Jose did get up for both breakfast and lunch out in the lounge. Writer also ambulated Jose the length of the unit and back to his room x 1 after lunch. Writer asked Pt if he would like to go for a walk, Gene replied \"sure\" then asked when we were standing in the Dining room \"when is my ride coming\"     Jose continues confused, he is  oriented to self only. Multiple times this shift Pt asked this writer \"Where am I', and \"how long have I been here\".    Pt's appetite was fair to poor, his fluid intake was fair. Pt has had 1.5 bottles of Chocolate Ensure this shift (12 oz), 2 orange juice (8 oz),  1/3 Tracy Magic Cup.   Jose's urine output is at least 110 clear, dk franko urine. Pt appears to have missed his collection basin while urinating as urine was observed in his toilet as well. Additionally, writer observed stool smeared on the urine collection basin x 1 this shift.     Jose was cooperative with [facility] [employer] labs, he was med adherent. Jose refused to take a shower, refused to allow writer to collect Covid swab for testing. Pt did not attend groups. Pt denied having suicidal ideation or self harm thoughts. Pt's affect was flat, pt did not demonstrate any dysregulated behavior.   "

## 2021-11-29 NOTE — PROGRESS NOTES
CLINICAL NUTRITION SERVICES - REASSESSMENT NOTE     Nutrition Prescription    RECOMMENDATIONS FOR MDs/PROVIDERS TO ORDER:  EN if pt is agreeable     Malnutrition Status:    Severe malnutrition in the context of acute illness     Recommendations already ordered by Registered Dietitian (RD):  Adjusted ensure-chocolate only    Future/Additional Recommendations:  Follow goals of care, monitor intake, weight, appropriateness of nutrition support based on plan/goals of care     EVALUATION OF THE PROGRESS TOWARD GOALS   Diet: mechanical/dental soft  Supplements: twice daily with meals, Magic cup with lunch and dinner, two marcos q meal, additional supplements PRN   Intake: 0-25% of meals per flowsheet       NEW FINDINGS   Spoke to RN, intakes of meals remain poor however pt seemed to take more supplements today. Per RN pt had ~25% of breakfast this morning and 1/2 of an ensure. Ate ~1/3 of a magic cup and a full ensure for lunch. RN stated pt seems to do better with chocolate ensure, order updated. Pt has been receiving twocal, RN placed it in the fridge as it came up warm. Discussed offering this supplement between meals. Remains on megace. Pt has been declining nutrition support. Ethics consulted (11/16), plan for symptom management rather than involuntary treatment (nutrition support) requiring restraints.      Refused wt 11/27. 2# (1.2%) wt loss in 1 week. 21# (10.9%) wt loss in ~1 month, 26# (13.2%) wt loss in 3 months   11/23/21 77.6 kg (171 lb 1.6 oz)   11/16/21 78.8 kg (173 lb 11.6 oz)   10/12/21 87.4 kg (192 lb 9.6 oz)   09/28/21 89.3 kg (196 lb 14.4 oz)   08/29/21 89.5 kg (197 lb 4.8 oz)   07/22/21 89.2 kg (196 lb 12.8 oz)   06/30/21 89.1 kg (196 lb 8 oz)   03/31/21 84.2 kg (185 lb 9.6 oz)   08/26/20 70.3 kg (155 lb)   07/11/20 69.9 kg (154 lb)   06/27/20 70.3 kg (155 lb)   02/04/20 67.6 kg (149 lb)   01/20/20 69.2 kg (152 lb 9 oz)   09/03/18 78 kg (172 lb)     Labs: K low at 3.3 today, received replacement (20  mEq). Mg and phos WNL  MALNUTRITION  % Intake: </= 50% for >/= 5 days (severe)  % Weight Loss: > 7.5% in 3 months (severe)  Subcutaneous Fat Loss: Unable to assess  Muscle Loss: Unable to assess  Fluid Accumulation/Edema: None noted  Malnutrition Diagnosis: Severe malnutrition in the context of acute illness     Previous Goals   Patient to consume % of nutritionally adequate meal trays TID, or the equivalent with supplements/snacks.  Evaluation: Not met    Previous Nutrition Diagnosis  Inadequate oral intake of unclear etiology (failure to thrive, menu fatigue) as evidenced by poor intake and severe weight loss   Evaluation: No change    CURRENT NUTRITION DIAGNOSIS  Inadequate oral intake of unclear etiology (menu fatigue, mental healthy) as evidenced by poor intake and severe weight loss     INTERVENTIONS  Implementation  Adjusted ensure-chocolate only    Goals  Patient to consume % of nutritionally adequate meal trays TID, or the equivalent with supplements/snacks.    Monitoring/Evaluation  Progress toward goals will be monitored and evaluated per protocol.    Sharny Carnes MS, RD, LDN  Unit Pager [phone]

## 2021-11-29 NOTE — PLAN OF CARE
Assessment/Intervention/Current Symtoms and Care Coordination:    Current Symptoms include the following: Confusion, delusional thinking, irritability, oriented to only self.    Chart Review    Met with team to discuss patient care.    Spoke with patient's sister, Freida Machuca, [phone]. She said patient had previously called her frequently but stopped. Gave her an update on his progress. Provided her with the unit phone number. She confirmed patient is an enrolled member of [employer], she is not sure if there are resources for Guardianship.    Left a voicemail with Joi Zeng with [employer] ([phone]) requesting a return call to discuss possible resources for guardianship for a [employer] member.    Emailed [employer] to community fund  inquiring if jake funding is available for payment of guardian services.    Discharge Plan or Goal:  Pending stabilization & development of a safe discharge plan.  Considerations include: Nursing home    Barriers to Discharge:  Patient requires further psychiatric stabilization due to current symptomology, Medication management with possible adjustments and guardianship    Referral Status:  No new referrals made.    Legal Status:  Patient is under MI commitment in [facility] with [facility]. [id number]-[id number]      Contacts:  Case Management Services:  [facility]   Kurtis Chapin. Phone: [phone]  Supervisor is [email or web address]    Freida Machuca  Sister  [phone]

## 2021-11-29 NOTE — PLAN OF CARE
Slept a lot tonight for 14 hours, independent with repositioning in bed  Continent of small amount of urine, 120 ml dark yellow and concentrated.  Declined fluids offered to him  O2 sats 96% @ room air Pulse 93

## 2021-11-29 NOTE — PROGRESS NOTES
Potassium = 3.3  Internal Med paged, called back and  informed of Pt's  K+ level. Awaiting order.

## 2021-11-29 NOTE — PLAN OF CARE
Patient isolative to his room.  With much encouragement he came out of his room, walked about 8 feet and refused to come to the lounge to eat.  Became very irritated so patient was allowed to go back into room.  With help the patient ate 1 bite of hamburger and bun, 1 bite of lemon ice, 1/2 of protein drink, 1 carton of apple juice and 2 bites of applesauce.    Continues to be irritable through the rest of the shift. More difficult time for this writer to get him to take his pills tonight.    P:  Continue same plan of care.

## 2021-11-29 NOTE — PROGRESS NOTES
"[facility], [place]   Psychiatric Progress Note        Interim History:   The patient's care was discussed with the treatment team during the daily team meeting and/or staff's chart notes were reviewed.  Staff report patient has been irritable and suspicious. Vitamin D level was high at 99. Did come out for breakfast today.     The patient states that he is \"all right.\" Says that he wants to go home and states his address. Reports good sleep and appetite. Denies SI. Denies AH.          Medications:       aspirin  81 mg Oral Daily     atorvastatin  80 mg Oral At Bedtime     busPIRone  10 mg Oral TID     [Held by provider] cholecalciferol  1,250 mcg Oral Q7 Days     cloZAPine  200 mg Oral At Bedtime     gabapentin  100 mg Oral TID w/meals     haloperidol  1 mg Oral At Bedtime     levothyroxine  88 mcg Oral Daily     megestrol  20 mg Oral Daily     memantine  10 mg Oral BID     [Held by provider] metoprolol tartrate  25 mg Oral BID     mirtazapine  15 mg Oral At Bedtime     salmeterol  1 puff Inhalation BID          Allergies:     Allergies   Allergen Reactions     Ibuprofen      Latex           Labs:     Recent Results (from the past 24 hour(s))   Basic metabolic panel    Collection Time: 11/29/21  8:43 AM   Result Value Ref Range    Sodium 141 133 - 144 mmol/L    Potassium 3.3 (L) 3.4 - 5.3 mmol/L    Chloride 111 (H) 94 - 109 mmol/L    Carbon Dioxide (CO2) 21 20 - 32 mmol/L    Anion Gap 9 3 - 14 mmol/L    Urea Nitrogen 15 7 - 30 mg/dL    Creatinine 0.75 0.66 - 1.25 mg/dL    Calcium 9.1 8.5 - 10.1 mg/dL    Glucose 147 (H) 70 - 99 mg/dL    GFR Estimate >90 >60 mL/min/1.73m2   Magnesium    Collection Time: 11/29/21  8:43 AM   Result Value Ref Range    Magnesium 2.1 1.6 - 2.3 mg/dL   Phosphorus    Collection Time: 11/29/21  8:43 AM   Result Value Ref Range    Phosphorus 3.3 2.5 - 4.5 mg/dL          Psychiatric Examination:     BP 97/71   Pulse 118   Temp 97.4  F (36.3  C) (Temporal)   Resp " "18   Ht 1.829 m (6')   Wt 77.6 kg (171 lb 1.6 oz)   SpO2 96%   BMI 23.21 kg/m    Weight is 171 lbs 1.6 oz  Body mass index is 23.21 kg/m .  Orthostatic Vitals  Report      Most Recent      Lying Orthostatic BP 75/58 11/12 0850    Lying Orthostatic Pulse (bpm) 84 11/12 0850            Appearance: awake, alert  Attitude:  more cooperative  Eye Contact:  fair  Mood:  \"all right\"  Affect:  intensity is flat  Speech:  mumbling  Psychomotor Behavior:  no evidence of tardive dyskinesia, dystonia, or tics  Thought Process:  illogical  Associations:  no loose associations  Thought Content:  no evidence of suicidal ideation or homicidal ideation and obsessions present  Insight:  limited  Judgement:  limited  Oriented to:  person and place  Attention Span and Concentration:  fair  Recent and Remote Memory:  poor    Clinical Global Impressions  First:  Considering your total clinical experience with this particular patient population, how severe are the patient's symptoms at this time?: 7 (07/01/21 0630)  Compared to the patient's condition at the START of treatment, this patient's condition is: 4 (07/01/21 0630)  Most recent:  Considering your total clinical experience with this particular patient population, how severe are the patient's symptoms at this time?: 7 (11/09/21 1642)  Compared to the patient's condition at the START of treatment, this patient's condition is: 5 (11/09/21 1642)         Precautions:     Behavioral Orders   Procedures     Assault precautions     Cheeking Precautions (behavioral units)     Patient Observed swallowing PO medications; Patient asked to drink water after swallowing medication; Patient in Staff line of sight for 15 minutes after medication given; Mouth checks after PO administration (patient asked to open mouth and stick out their tongue).     Code 1     Code 2     With Security for any imaging/testing needed.     Elopement precautions     Fall precautions     Routine Programming     As " clinically indicated     Single Room     Status 15     Every 15 minutes.          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   Stimulant use disorder, in remission.   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 Is currently under commitment with Yanez recently amended to include Clozaril. While at [facility] was noted to have ongoing agitation and frequently attempting to elope from unit requiring 1:1 staffing for safety. He was started on 1:1 staff upon transfer, this was discontinued as patient has been more cooperative without elopement attempts. IM consult completed on admission and continued to follow due to elevated LFTs. Haldol and VPA discontinued due to LFTs. Cross titration completed from risperidone to clozaril after Yanez amended. EPSE/TD movements noted, have appeared stable over past few weeks. Patient has continued to have disorganization and active psychosis symptoms which appear to be at patients baseline. Team continues to work on disposition which barriers include patient has no guardian or next of kin willing to act as surrogate decision maker, see [facility] notes for complete details. Patient starting to have increasing symptoms noted week of  9/20, clozapine level ordered and noted to be lower than previous, have added cheeking precautions and increased clozapine dose on 9/24 and moved to split dosing given increasing afternoon agitation. Pt having decreased appetite, increased fatigue over weekend of 10/9-10/10, IM consult placed 10/11, KUB Xray showed large stool burden, bowel regimen modified. Pt was incontinent of stool evening of 10/13. Consolidated clozapine dose to bedtime given concern for daytime sedation with split dosing. Ongoing malaise noted starting 10/11, internal medicine has been contacted periodically. Noted 18# weight loss in 2 months, IM contacted for possible FTT on 11/4. Ethics consult placed 11/5. Neurology re-consulted 11/8.      Psychiatric treatment/inteventions:  Medications:   -continue clozapine 200mg at bedtime  -continue Haldol 1mg Qday  -continue gabapentin 100mg TID  -continue Remeron 15mg at bedtime.   -continue cheeking precautions, started 9/27 given noted decompensation in recent weeks and lower clozapine level   -continue memantine at 5mg daily for neurocognitive disorder, dose was up to 10mg BID and have been reducing dose, may taper off as patient does not appear to have benefit and reporting oversedation and to reduce anticholinergic load  -continue risperdal 1mg Q6H PRN agitation   -continue lorazepam 0.5-1.0mg Q4H PRN anxiety or severe agitation     -discontinued benztropine 0.25mg BID on 10/21, tapered off to reduce anticholinergic load  -discontinued rivastigmine patch on 10/21,pt did not seemed to have benefit and to reduce anticholinergic load     -Ethics consult placed 11/5 due to patient lacking decision making capacity without guardian or surrogate decision maker and possible need for medical interventions including NJ/NG or PEG tube in future, appreciate assistance, care conference on 11/11    Discussed plan for NG tube with medicine and they are following this closely.   Continue Megace    Disposition  Plan   Reason for ongoing admission: is unable to care for self due to severe psychosis or mariusz  Discharge location: group home  Discharge Medications: not ordered  Follow-up Appointments: not scheduled  Legal Status: full commitment    Entered by: Wilton Morfin on November 29, 2021 at 1:29 PM

## 2021-11-30 PROCEDURE — 250N000013 HC RX MED GY IP 250 OP 250 PS 637: Performed by: PSYCHIATRY & NEUROLOGY

## 2021-11-30 PROCEDURE — 99233 SBSQ HOSP IP/OBS HIGH 50: CPT | Performed by: PSYCHIATRY & NEUROLOGY

## 2021-11-30 PROCEDURE — 124N000003 HC R&B MH SENIOR/ADOLESCENT

## 2021-11-30 RX ADMIN — MIRTAZAPINE 15 MG: 15 TABLET, FILM COATED ORAL at 20:00

## 2021-11-30 RX ADMIN — SALMETEROL XINAFOATE 1 PUFF: 50 POWDER, METERED ORAL; RESPIRATORY (INHALATION) at 20:02

## 2021-11-30 RX ADMIN — CLOZAPINE 200 MG: 100 TABLET ORAL at 20:01

## 2021-11-30 RX ADMIN — BUSPIRONE HYDROCHLORIDE 10 MG: 10 TABLET ORAL at 08:40

## 2021-11-30 RX ADMIN — GABAPENTIN 100 MG: 100 CAPSULE ORAL at 18:04

## 2021-11-30 RX ADMIN — SALMETEROL XINAFOATE 1 PUFF: 50 POWDER, METERED ORAL; RESPIRATORY (INHALATION) at 08:42

## 2021-11-30 RX ADMIN — HALOPERIDOL 1 MG: 1 TABLET ORAL at 20:01

## 2021-11-30 RX ADMIN — MEMANTINE 10 MG: 10 TABLET ORAL at 08:40

## 2021-11-30 RX ADMIN — BUSPIRONE HYDROCHLORIDE 10 MG: 10 TABLET ORAL at 13:14

## 2021-11-30 RX ADMIN — ATORVASTATIN CALCIUM 80 MG: 80 TABLET, FILM COATED ORAL at 20:00

## 2021-11-30 RX ADMIN — GABAPENTIN 100 MG: 100 CAPSULE ORAL at 13:14

## 2021-11-30 RX ADMIN — ASPIRIN 81 MG CHEWABLE TABLET 81 MG: 81 TABLET CHEWABLE at 08:40

## 2021-11-30 RX ADMIN — BUSPIRONE HYDROCHLORIDE 10 MG: 10 TABLET ORAL at 20:01

## 2021-11-30 RX ADMIN — GABAPENTIN 100 MG: 100 CAPSULE ORAL at 08:40

## 2021-11-30 RX ADMIN — MEMANTINE 10 MG: 10 TABLET ORAL at 20:01

## 2021-11-30 RX ADMIN — MEGESTROL ACETATE 20 MG: 20 TABLET ORAL at 08:40

## 2021-11-30 RX ADMIN — LEVOTHYROXINE SODIUM 88 MCG: 88 TABLET ORAL at 08:41

## 2021-11-30 ASSESSMENT — ACTIVITIES OF DAILY LIVING (ADL)
LAUNDRY: UNABLE TO COMPLETE
HYGIENE/GROOMING: PROMPTS
ORAL_HYGIENE: PROMPTS
LAUNDRY: UNABLE TO COMPLETE
DRESS: SCRUBS (BEHAVIORAL HEALTH)
HYGIENE/GROOMING: PROMPTS
DRESS: PROMPTS
ORAL_HYGIENE: PROMPTS

## 2021-11-30 NOTE — PLAN OF CARE
Assessment/Intervention/Current Symtoms and Care Coordination:    Reviewed chart    Reviewed care with team    Pt's guardianship situation is being worked on by the [facility] Attorney's office. The guardianship process is complex and the [facility] has been working on it. Now they are negotiating with an agency that is working with them on finalizing this. No need for new referrals.     Discharge Plan or Goal:  Pending stabilization & development of a safe discharge plan.  Considerations include: Nursing home     Barriers to Discharge:  Patient requires further psychiatric stabilization due to current symptomology, Medication management with possible adjustments and guardianship     Referral Status:  No new referrals made.     Legal Status:  Patient is under MI commitment in [facility] with Yanez. 10-GD-CN-        Contacts:  Case Management Services:  [facility]   Kurtis Chapin. Phone: [phone]  Supervisor is [email or web address]     Freida Talley  [phone]

## 2021-11-30 NOTE — PLAN OF CARE
"  Problem: Behavioral Health Plan of Care  Goal: Plan of Care Review  Outcome: No Change  Flowsheets (Taken 11/30/2021 1000)  Plan of Care Reviewed With: patient  Patient Agreement with Plan of Care: (\"I don't know why I am here, you tell me why?\") other (see comments)     Problem: Psychotic Symptoms  Goal: Psychotic Symptoms  Description: Signs and symptoms of listed problems will be absent or manageable.  Outcome: No Change  Goal: Social and Therapeutic (Psychotic Symptoms)  Description: Signs and symptoms of listed problems will be absent or manageable.  Outcome: No Change     Problem: Sleep Disturbance  Goal: Adequate Sleep/Rest  Outcome: No Change     Problem: Cognitive Impairment (Psychotic Signs/Symptoms)  Goal: Optimal Cognitive Function (Psychotic Signs/Symptoms)  Outcome: No Change  Intervention: Support and Promote Cognitive Ability  Recent Flowsheet Documentation  Taken 11/30/2021 1000 by Juarez Murphy RN  Trust Relationship/Rapport: care explained     Problem: Malnutrition  Goal: Improved Nutritional Intake  Outcome: No Change     Patient calm and medication compliant, affect is flat. Mood can become labile when encouraged to perform activity. Patient is Alert to self, with poor insight and judgement. Denies SI/SIB, anxiety and depression. Poor oral intake states, \"I'm not hungry\". Writer was able to get 3 bites breakfast in patient and 0% lunch. Patient had  540 ml of fluids. Output unmeasured but patient states he is voiding. Patient declining Covid swab at this time but will continue to re approach. Patient K+ recently replaced, watch for labs on Thursday.  "

## 2021-11-30 NOTE — PROGRESS NOTES
"[facility], [place]   Psychiatric Progress Note        Interim History:   The patient's care was discussed with the treatment team during the daily team meeting and/or staff's chart notes were reviewed.  Staff report patient has been oriented to self only. Denies SI or SIB. May have a per capita with his [place] that would help pay for his guardianship. Eating some. Out of his room more.     The patient states that he is \"all right.\" More engaged in the interview. Asks when he can leave and says that \"I can't hear you\" when this provider's mask muffles a response. Denies problems with sleep or mood. Denies SI or AH. Does report that he is eating better and agreeable to try and eat more in order to help facilitate discharge.          Medications:       aspirin  81 mg Oral Daily     atorvastatin  80 mg Oral At Bedtime     busPIRone  10 mg Oral TID     [Held by provider] cholecalciferol  1,250 mcg Oral Q7 Days     cloZAPine  200 mg Oral At Bedtime     gabapentin  100 mg Oral TID w/meals     haloperidol  1 mg Oral At Bedtime     levothyroxine  88 mcg Oral Daily     megestrol  20 mg Oral Daily     memantine  10 mg Oral BID     [Held by provider] metoprolol tartrate  25 mg Oral BID     mirtazapine  15 mg Oral At Bedtime     salmeterol  1 puff Inhalation BID          Allergies:     Allergies   Allergen Reactions     Ibuprofen      Latex           Labs:     No results found for this or any previous visit (from the past 24 hour(s)).       Psychiatric Examination:     /71 (BP Location: Right arm)   Pulse 99   Temp 98  F (36.7  C) (Temporal)   Resp 16   Ht 1.829 m (6')   Wt 77.6 kg (171 lb 1.6 oz)   SpO2 96%   BMI 23.21 kg/m    Weight is 171 lbs 1.6 oz  Body mass index is 23.21 kg/m .  Orthostatic Vitals  Report      Most Recent      Lying Orthostatic BP 75/58 11/12 0850    Lying Orthostatic Pulse (bpm) 84 11/12 0850            Appearance: awake, alert  Attitude:  more cooperative  Eye " "Contact:  good  Mood:  \"all right\"  Affect:  intensity is flat  Speech:  clear, coherent  Psychomotor Behavior:  no evidence of tardive dyskinesia, dystonia, or tics  Thought Process:  illogical  Associations:  no loose associations  Thought Content:  no evidence of suicidal ideation or homicidal ideation and obsessions present  Insight:  limited  Judgement:  limited  Oriented to:  person and place  Attention Span and Concentration:  fair  Recent and Remote Memory:  poor    Clinical Global Impressions  First:  Considering your total clinical experience with this particular patient population, how severe are the patient's symptoms at this time?: 7 (07/01/21 0630)  Compared to the patient's condition at the START of treatment, this patient's condition is: 4 (07/01/21 0630)  Most recent:  Considering your total clinical experience with this particular patient population, how severe are the patient's symptoms at this time?: 7 (11/09/21 1642)  Compared to the patient's condition at the START of treatment, this patient's condition is: 5 (11/09/21 1642)         Precautions:     Behavioral Orders   Procedures     Assault precautions     Cheeking Precautions (behavioral units)     Patient Observed swallowing PO medications; Patient asked to drink water after swallowing medication; Patient in Staff line of sight for 15 minutes after medication given; Mouth checks after PO administration (patient asked to open mouth and stick out their tongue).     Code 1     Code 2     With Security for any imaging/testing needed.     Elopement precautions     Fall precautions     Routine Programming     As clinically indicated     Single Room     Status 15     Every 15 minutes.          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   Stimulant use disorder, in " remission.   Transaminitis, possibly medication induced   Hx of CVA  Vit D deficiency  Hypertension  COPD  Hx of infective endocarditis with severe mitral and aortic regurgitation s/p biprostehtic valve replacement 2015  Hx of HFrEF now recovered  Tobacco use disorder  Severe malnutrition in context of acute illness   Urinary incontinence  Constipation   Malaise  Prolonged QTc         Plan:     Assessment and hospital summary:  The patient is a 56yo male with a history of schizophrenia and TBI and multiple medical co-morbidities as above who was admitted after being transferred from [facility] after a nearly year-long stay. Is currently under commitment with Yanez recently amended to include Clozaril. While at [facility] was noted to have ongoing agitation and frequently attempting to elope from unit requiring 1:1 staffing for safety. He was started on 1:1 staff upon transfer, this was discontinued as patient has been more cooperative without elopement attempts. IM consult completed on admission and continued to follow due to elevated LFTs. Haldol and VPA discontinued due to LFTs. Cross titration completed from risperidone to clozaril after Yanez amended. EPSE/TD movements noted, have appeared stable over past few weeks. Patient has continued to have disorganization and active psychosis symptoms which appear to be at patients baseline. Team continues to work on disposition which barriers include patient has no guardian or next of kin willing to act as surrogate decision maker, see CTC notes for complete details. Patient starting to have increasing symptoms noted week of 9/20, clozapine level ordered and noted to be lower than previous, have added cheeking precautions and increased clozapine dose on 9/24 and moved to split dosing given increasing afternoon agitation. Pt having decreased appetite, increased fatigue over weekend of 10/9-10/10, IM consult placed 10/11, KUB Xray showed large stool burden,  bowel regimen modified. Pt was incontinent of stool evening of 10/13. Consolidated clozapine dose to bedtime given concern for daytime sedation with split dosing. Ongoing malaise noted starting 10/11, internal medicine has been contacted periodically. Noted 18# weight loss in 2 months, IM contacted for possible FTT on 11/4. Ethics consult placed 11/5. Neurology re-consulted 11/8.      Psychiatric treatment/inteventions:  Medications:   -continue clozapine 200mg at bedtime  -continue Haldol 1mg Qday  -continue gabapentin 100mg TID  -continue Remeron 15mg at bedtime.   -continue cheeking precautions, started 9/27 given noted decompensation in recent weeks and lower clozapine level   -continue memantine at 5mg daily for neurocognitive disorder, dose was up to 10mg BID and have been reducing dose, may taper off as patient does not appear to have benefit and reporting oversedation and to reduce anticholinergic load  -continue risperdal 1mg Q6H PRN agitation   -continue lorazepam 0.5-1.0mg Q4H PRN anxiety or severe agitation     -discontinued benztropine 0.25mg BID on 10/21, tapered off to reduce anticholinergic load  -discontinued rivastigmine patch on 10/21,pt did not seemed to have benefit and to reduce anticholinergic load     -Ethics consult placed 11/5 due to patient lacking decision making capacity without guardian or surrogate decision maker and possible need for medical interventions including NJ/NG or PEG tube in future, appreciate assistance, care conference on 11/11    Discussed plan for NG tube with medicine and they are following this closely.   Continue Megace    Disposition Plan   Reason for ongoing admission: is unable to care for self due to severe psychosis or mariusz  Discharge location: group home  Discharge Medications: not ordered  Follow-up Appointments: not scheduled  Legal Status: full commitment    Entered by: Wilton Morfin on November 30, 2021 at 10:25 AM

## 2021-11-30 NOTE — PLAN OF CARE
Problem: Sleep Disturbance  Goal: Adequate Sleep/Rest  Outcome: No Change     Slept uninterrupted for 9 hours  O2 sats 95% at room air  Offered fluids, drank 240 ml of water  Incontinent of urine and assisted to the toilet, had a large void,missed the hat, dark yellow and clear.  Allowed paricare.

## 2021-12-01 PROCEDURE — 99233 SBSQ HOSP IP/OBS HIGH 50: CPT | Performed by: PSYCHIATRY & NEUROLOGY

## 2021-12-01 PROCEDURE — 124N000003 HC R&B MH SENIOR/ADOLESCENT

## 2021-12-01 PROCEDURE — 250N000013 HC RX MED GY IP 250 OP 250 PS 637: Performed by: PSYCHIATRY & NEUROLOGY

## 2021-12-01 RX ADMIN — MEMANTINE 10 MG: 10 TABLET ORAL at 08:11

## 2021-12-01 RX ADMIN — GABAPENTIN 100 MG: 100 CAPSULE ORAL at 13:05

## 2021-12-01 RX ADMIN — GABAPENTIN 100 MG: 100 CAPSULE ORAL at 18:00

## 2021-12-01 RX ADMIN — ATORVASTATIN CALCIUM 80 MG: 80 TABLET, FILM COATED ORAL at 20:19

## 2021-12-01 RX ADMIN — BUSPIRONE HYDROCHLORIDE 10 MG: 10 TABLET ORAL at 08:11

## 2021-12-01 RX ADMIN — LEVOTHYROXINE SODIUM 88 MCG: 88 TABLET ORAL at 08:11

## 2021-12-01 RX ADMIN — SALMETEROL XINAFOATE 1 PUFF: 50 POWDER, METERED ORAL; RESPIRATORY (INHALATION) at 20:00

## 2021-12-01 RX ADMIN — HALOPERIDOL 1 MG: 1 TABLET ORAL at 20:26

## 2021-12-01 RX ADMIN — ASPIRIN 81 MG CHEWABLE TABLET 81 MG: 81 TABLET CHEWABLE at 08:11

## 2021-12-01 RX ADMIN — MIRTAZAPINE 15 MG: 15 TABLET, FILM COATED ORAL at 20:27

## 2021-12-01 RX ADMIN — CLOZAPINE 200 MG: 100 TABLET ORAL at 20:27

## 2021-12-01 RX ADMIN — GABAPENTIN 100 MG: 100 CAPSULE ORAL at 08:11

## 2021-12-01 RX ADMIN — BUSPIRONE HYDROCHLORIDE 10 MG: 10 TABLET ORAL at 20:27

## 2021-12-01 RX ADMIN — BUSPIRONE HYDROCHLORIDE 10 MG: 10 TABLET ORAL at 13:05

## 2021-12-01 RX ADMIN — MEGESTROL ACETATE 20 MG: 20 TABLET ORAL at 08:11

## 2021-12-01 RX ADMIN — SALMETEROL XINAFOATE 1 PUFF: 50 POWDER, METERED ORAL; RESPIRATORY (INHALATION) at 08:11

## 2021-12-01 RX ADMIN — MEMANTINE 10 MG: 10 TABLET ORAL at 20:27

## 2021-12-01 ASSESSMENT — ACTIVITIES OF DAILY LIVING (ADL)
DRESS: SCRUBS (BEHAVIORAL HEALTH)
ORAL_HYGIENE: PROMPTS
HYGIENE/GROOMING: PROMPTS
ORAL_HYGIENE: PROMPTS
DRESS: SCRUBS (BEHAVIORAL HEALTH)
LAUNDRY: UNABLE TO COMPLETE
HYGIENE/GROOMING: PROMPTS

## 2021-12-01 NOTE — PROGRESS NOTES
Podiatry/Ortho:    Consult requested for cutting of fingernails.  Podiatry does not perform this service.  Recommend possibly nursing perform this service, or could wait for outpatient service to perform.  Will look for other alternative options and inform the team if able.    Lizzeth Bernabe PA-C  [phone] (pager)

## 2021-12-01 NOTE — PLAN OF CARE
Anisa ambulatory encounter    ORTHOPEDIC WORKER'S COMP INITIAL EVALUATION      WORK INFORMATION:    EMPLOYER:  [employer]  DATE OF INJURY:  August 30, 2017    CHIEF COMPLAINT:  Workers Compensation Initial Visit (w/c initial visit, right knee pain, doi 8/30/17)       SUBJECTIVE:    Katt Gamez is a 50 year old female, who presents with an initial complaint of an injury that reportedly occurred during work activities.  She works at [employer] as a nurse. She states she was taking the garbage out prior to the end of her shift. After throwing a bag in the dumpster she saw an animal jump out of the dumpster. She then turned around quickly and her right shoulder ran into the post on the dumpster which caused her right knee to twist. She felt immediate right knee pain. She also reports left thumb pain as this did cause her to fall back on the ground. She states since this time her right knee has occasional locking on her. It is swollen and fails to improve. She did report to the emergency department. She has obtained an MRI of her right knee. She has taken over-the-counter anti-inflammatories with no relief. She denies any knee pain prior to this incident.      HISTORIES:    I have personally reviewed and updated the following EPIC sections:  Current Medications, Allergies, Problem List, Social History , Past Medical History and Past Surgical History    REVIEW OF SYSTEMS:    A review of systems was performed and findings relevant to this injury are included in the History of Present IllnessI.     OBJECTIVE:    PHYSICAL EXAMINATION:    Vitals:   Visit Vitals  Temp 97.7 °F (36.5 °C) (Oral)   Ht 5' 6\" (1.676 m)   Wt 86.6 kg   BMI 30.83 kg/m²      Constitutional:  Well-developed and well-nourished female in no acute distress.  Skin: Warm, dry, intact without rash or lesion.  Neurologic:  Alert and oriented x3.   Musculoskeletal:   +3 effusion noted in the right knee. Right knee posterior medial joint line  Assessment/Intervention/Current Symtoms and Care Coordination:    Reviewed chart    Reviewed care with team    Pt's guardianship situation is being worked on by the [facility] Attorney's office.      Discharge Plan or Goal:  Pending stabilization & development of a safe discharge plan.  Considerations include: Nursing home     Barriers to Discharge:  Patient requires further psychiatric stabilization due to current symptomology, Medication management with possible adjustments and guardianship     Referral Status:  No new referrals made.     Legal Status:  Patient is under MI commitment in [facility] with Yanez. [id number]        Contacts:  Case Management Services:  [facility]   Kurtis Chapin. Phone: [phone]  Supervisor is [email or web address]     Freidadani Machuca  Sister  [phone]   is exquisitely tender palpation. Patella is nontender palpation. Stable varus valgus stress test. She does have an exquisitely positive Franco sign. Negative anterior and posterior drawer.  There is a healing bruise noted in right biceps.  Tenderness is noted over the left thenar eminence. Palpable radial pulse. Positive intrinsic .    IMAGING STUDIES:    MRI of her right knee does demonstrate a medial meniscal tear of posterior root. Large effusion is noted. There is also a bony contusion of the medial tibial plateau.  X-ray of her right knee is negative for fracture. Joint lines are relatively well preserved.    ASSESSMENT:    1. Acute pain of right knee    2. Acute medial meniscal injury of right knee, initial encounter    3. Effusion of right knee    4. Pain of right thumb        PLAN:    We had a nice discussion regarding Katt's right knee and left thumb pain. This is certainly a result of the injury that occurred at work on 8/30/17. She does have a quite large right knee effusion. MRI and physical exam are also consistent with a medial meniscal tear. Treatment options were discussed and she does wish to proceed with a right knee cortisone injection to help decrease swelling and pain. Regarding her left thumb pain. This is consistent with a contusion over the thenar eminence. I do recommend a thumb spica splint. We will see her back in 2 weeks. At this time if her pain fails to improve we will likely order an MRI. If the cortisone injection in her right knee fails to improve her right knee pain and/or her right knee locking symptoms continue we will likely further discuss potential for right knee arthroscopy.    .Katt Gamez is aware of the expectations of the injection.  The risks and complications of the injection have been explained as well as the alternatives.  The complications were discussed. There were no assurances or guarantees given to Katt Gamez as to the result of the injection.    The  injection site was prepped with alcohol. The right knee joint space was  injected with 80 mg Depo-Medrol, 3 cc 0.5% Marcaine plain, and 3 cc 1% lidocaine plain. The patient tolerated the injection well and without complication.        No orders of the defined types were placed in this encounter.      No Follow-up on file.    WORK STATUS:  She may resume work with sedentary work only. She is to avoid going up or down stairs.    On 9/8/2017, IBrendon PA-C scribed the services personally performed by Mio Andre MD    I have reviewed the scribes note and agree with their documentation of the patient encounter.

## 2021-12-01 NOTE — PROGRESS NOTES
"[facility], [place]   Psychiatric Progress Note        Interim History:   The patient's care was discussed with the treatment team during the daily team meeting and/or staff's chart notes were reviewed.  Staff report patient has been coming out of his room more. Eating and drinking some. Oriented to self only. No insight. Was delusional last night. Disheveled.     The patient states that he is \"fine.\" Denies problems with sleep. Says that he is \"not hungry\" and didn't eat breakfast. Does plan on coming to the lounge and eating lunch. Denies SI. Denies AH. Asks to leave and gets irritable when attempts are made to discuss the next steps.          Medications:       aspirin  81 mg Oral Daily     atorvastatin  80 mg Oral At Bedtime     busPIRone  10 mg Oral TID     [Held by provider] cholecalciferol  1,250 mcg Oral Q7 Days     cloZAPine  200 mg Oral At Bedtime     gabapentin  100 mg Oral TID w/meals     haloperidol  1 mg Oral At Bedtime     levothyroxine  88 mcg Oral Daily     megestrol  20 mg Oral Daily     memantine  10 mg Oral BID     [Held by provider] metoprolol tartrate  25 mg Oral BID     mirtazapine  15 mg Oral At Bedtime     salmeterol  1 puff Inhalation BID          Allergies:     Allergies   Allergen Reactions     Ibuprofen      Latex           Labs:     No results found for this or any previous visit (from the past 24 hour(s)).       Psychiatric Examination:     BP 96/68   Pulse 100   Temp 97.7  F (36.5  C) (Temporal)   Resp 16   Ht 1.829 m (6')   Wt 77.6 kg (171 lb 1.6 oz)   SpO2 95%   BMI 23.21 kg/m    Weight is 171 lbs 1.6 oz  Body mass index is 23.21 kg/m .  Orthostatic Vitals  Report      Most Recent      Lying Orthostatic BP 75/58 11/12 0850    Lying Orthostatic Pulse (bpm) 84 11/12 0850            Appearance: awake, alert  Attitude:  somewhat cooperative  Eye Contact:  good  Mood:  \"fine\"  Affect:  intensity is flat  Speech:  mumbling  Psychomotor Behavior:  no " evidence of tardive dyskinesia, dystonia, or tics  Thought Process:  illogical  Associations:  no loose associations  Thought Content:  no evidence of suicidal ideation or homicidal ideation and obsessions present  Insight:  limited  Judgement:  limited  Oriented to:  person and place  Attention Span and Concentration:  fair  Recent and Remote Memory:  poor    Clinical Global Impressions  First:  Considering your total clinical experience with this particular patient population, how severe are the patient's symptoms at this time?: 7 (07/01/21 0630)  Compared to the patient's condition at the START of treatment, this patient's condition is: 4 (07/01/21 0630)  Most recent:  Considering your total clinical experience with this particular patient population, how severe are the patient's symptoms at this time?: 7 (11/09/21 1642)  Compared to the patient's condition at the START of treatment, this patient's condition is: 5 (11/09/21 1642)         Precautions:     Behavioral Orders   Procedures     Assault precautions     Cheeking Precautions (behavioral units)     Patient Observed swallowing PO medications; Patient asked to drink water after swallowing medication; Patient in Staff line of sight for 15 minutes after medication given; Mouth checks after PO administration (patient asked to open mouth and stick out their tongue).     Code 1     Code 2     With Security for any imaging/testing needed.     Elopement precautions     Fall precautions     Routine Programming     As clinically indicated     Single Room     Status 15     Every 15 minutes.          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   Stimulant use disorder, in remission.   Transaminitis, possibly medication induced   Hx of CVA  Vit D deficiency  Hypertension  COPD  Hx of  infective endocarditis with severe mitral and aortic regurgitation s/p biprostehtic valve replacement 2015  Hx of HFrEF now recovered  Tobacco use disorder  Severe malnutrition in context of acute illness   Urinary incontinence  Constipation   Malaise  Prolonged QTc         Plan:     Assessment and hospital summary:  The patient is a 56yo male with a history of schizophrenia and TBI and multiple medical co-morbidities as above who was admitted after being transferred from [facility] after a nearly year-long stay. Is currently under commitment with Yanez recently amended to include Clozaril. While at [facility] was noted to have ongoing agitation and frequently attempting to elope from unit requiring 1:1 staffing for safety. He was started on 1:1 staff upon transfer, this was discontinued as patient has been more cooperative without elopement attempts. IM consult completed on admission and continued to follow due to elevated LFTs. Haldol and VPA discontinued due to LFTs. Cross titration completed from risperidone to clozaril after Yanez amended. EPSE/TD movements noted, have appeared stable over past few weeks. Patient has continued to have disorganization and active psychosis symptoms which appear to be at patients baseline. Team continues to work on disposition which barriers include patient has no guardian or next of kin willing to act as surrogate decision maker, see CTC notes for complete details. Patient starting to have increasing symptoms noted week of 9/20, clozapine level ordered and noted to be lower than previous, have added cheeking precautions and increased clozapine dose on 9/24 and moved to split dosing given increasing afternoon agitation. Pt having decreased appetite, increased fatigue over weekend of 10/9-10/10, IM consult placed 10/11, KUB Xray showed large stool burden, bowel regimen modified. Pt was incontinent of stool evening of 10/13. Consolidated clozapine dose to bedtime given  concern for daytime sedation with split dosing. Ongoing malaise noted starting 10/11, internal medicine has been contacted periodically. Noted 18# weight loss in 2 months, IM contacted for possible FTT on 11/4. Ethics consult placed 11/5. Neurology re-consulted 11/8.      Psychiatric treatment/inteventions:  Medications:   -continue clozapine 200mg at bedtime  -continue Haldol 1mg Qday  -continue gabapentin 100mg TID  -continue Remeron 15mg at bedtime.   -continue cheeking precautions, started 9/27 given noted decompensation in recent weeks and lower clozapine level   -continue memantine at 5mg daily for neurocognitive disorder, dose was up to 10mg BID and have been reducing dose, may taper off as patient does not appear to have benefit and reporting oversedation and to reduce anticholinergic load  -continue risperdal 1mg Q6H PRN agitation   -continue lorazepam 0.5-1.0mg Q4H PRN anxiety or severe agitation     -discontinued benztropine 0.25mg BID on 10/21, tapered off to reduce anticholinergic load  -discontinued rivastigmine patch on 10/21,pt did not seemed to have benefit and to reduce anticholinergic load     -Ethics consult placed 11/5 due to patient lacking decision making capacity without guardian or surrogate decision maker and possible need for medical interventions including NJ/NG or PEG tube in future, appreciate assistance, care conference on 11/11    Discussed plan for NG tube with medicine and they are following this closely.   Continue Megace    Consulted podiatry for patient's overgrown fingernails.     Disposition Plan   Reason for ongoing admission: is unable to care for self due to severe psychosis or mariusz  Discharge location: group home  Discharge Medications: not ordered  Follow-up Appointments: not scheduled  Legal Status: full commitment    Entered by: Wilton Morfin on December 1, 2021 at 10:48 AM

## 2021-12-01 NOTE — PLAN OF CARE
"  Problem: Behavioral Disturbance  Goal: Behavioral Disturbance  Description: Signs and symptoms of listed problems will be absent or manageable by discharge or transition of care.  Outcome: No Change       Pt is very resistive to getting out of bed.  Pt becomes agitated when staff approach him to get out of bed, to eat, to perform ADLs.  Pt states - \"Fuck you - I shouldn't even be here.  My commitment is up.  I talked to a  and he is coming to pick me up.\"  Pt states \"leave me alone, I am not hungry.  I am tired.\"  Pt was agreeable to ambulate to the lounge with SBA x 1, gait is unsteady.  Pt remained in the lounge for approximately 30 minutes.  Pt's speech is somewhat more coherent this shift.  He is oriented to self only.   Pt compliant with scheduled medications.  Pt changing positions in bed independently and with prompts.    Pt ate one bite of mashed potatoes from his dinner tray.     Intake:  540 mL (combination of supplements and water)    Vitals:  /69, HR 88, 02 sat 97%, T 97.6    Pt requires medical bed due to mobility issues and failure to thrive  "

## 2021-12-01 NOTE — PLAN OF CARE
Problem: Sleep Disturbance  Goal: Adequate Sleep/Rest  Outcome: No Change       Slept well for 12.5 hrs  Independent in repositioning in bed  Got up to use the toilet once  Offered fluids  Intake : 120 ml of water  Output: 260 ml of clear franko colored urinr  O2 sats 95% at room air P 93

## 2021-12-01 NOTE — PLAN OF CARE
Problem: Psychotic Symptoms  Goal: Psychotic Symptoms  Description: Signs and symptoms of listed problems will be absent or manageable.  Outcome: No Change  Goal: Social and Therapeutic (Psychotic Symptoms)  Description: Signs and symptoms of listed problems will be absent or manageable.  Outcome: No Change     Problem: Cognitive Impairment (Psychotic Signs/Symptoms)  Goal: Optimal Cognitive Function (Psychotic Signs/Symptoms)  Outcome: No Change  Intervention: Support and Promote Cognitive Ability  Recent Flowsheet Documentation  Taken 12/1/2021 1000 by Juarez Murphy RN  Trust Relationship/Rapport: care explained     Problem: Malnutrition  Goal: Improved Nutritional Intake  Outcome: No Change     Problem: Fall Injury Risk  Goal: Absence of Fall and Fall-Related Injury  Outcome: No Change  Intervention: Promote Injury-Free Environment  Recent Flowsheet Documentation  Taken 12/1/2021 1000 by Juarez Murphy RN  Safety Promotion/Fall Prevention: activity supervised     Patient denies mental health illness, mood is flat and blunted. Insight is poor. Patient's appearance is disheveled, refuses ADLs. Patient came out for breakfast upon request to take medications. Ate a few bites. Patient came out for lunch as well, ate a few bites of pasta, mandarin oranges, and icecream. it appears telling patient to come out of room for medications as been working to get him to the milieu for at least a few minutes. Patient voided 300 franko urine this shift. Podiatry consult placed about finger nails, hopeful they can address his fingernails or find a solution for nursing, as patients nails are thick and curling in. BP soft 96/68, fluids pushed. Patient drank 600cc patient remained isolative and withdrawn most of shift. Will continue to push to participate in cares.

## 2021-12-02 ENCOUNTER — APPOINTMENT (OUTPATIENT)
Dept: PHYSICAL THERAPY | Facility: CLINIC | Age: 56
DRG: 885 | End: 2021-12-02
Attending: PSYCHIATRY & NEUROLOGY
Payer: COMMERCIAL

## 2021-12-02 LAB
ANION GAP SERPL CALCULATED.3IONS-SCNC: 8 MMOL/L (ref 3–14)
BASOPHILS # BLD AUTO: 0.1 10E3/UL (ref 0–0.2)
BASOPHILS NFR BLD AUTO: 1 %
BUN SERPL-MCNC: 12 MG/DL (ref 7–30)
CALCIUM SERPL-MCNC: 9 MG/DL (ref 8.5–10.1)
CHLORIDE BLD-SCNC: 113 MMOL/L (ref 94–109)
CO2 SERPL-SCNC: 21 MMOL/L (ref 20–32)
CREAT SERPL-MCNC: 0.73 MG/DL (ref 0.66–1.25)
EOSINOPHIL # BLD AUTO: 1.7 10E3/UL (ref 0–0.7)
EOSINOPHIL NFR BLD AUTO: 19 %
ERYTHROCYTE [DISTWIDTH] IN BLOOD BY AUTOMATED COUNT: 14.3 % (ref 10–15)
GFR SERPL CREATININE-BSD FRML MDRD: >90 ML/MIN/1.73M2
GLUCOSE BLD-MCNC: 88 MG/DL (ref 70–99)
HCT VFR BLD AUTO: 41.4 % (ref 40–53)
HGB BLD-MCNC: 13.9 G/DL (ref 13.3–17.7)
IMM GRANULOCYTES # BLD: 0.1 10E3/UL
IMM GRANULOCYTES NFR BLD: 1 %
LYMPHOCYTES # BLD AUTO: 1.2 10E3/UL (ref 0.8–5.3)
LYMPHOCYTES NFR BLD AUTO: 14 %
MAGNESIUM SERPL-MCNC: 2.2 MG/DL (ref 1.6–2.3)
MCH RBC QN AUTO: 30.4 PG (ref 26.5–33)
MCHC RBC AUTO-ENTMCNC: 33.6 G/DL (ref 31.5–36.5)
MCV RBC AUTO: 91 FL (ref 78–100)
MONOCYTES # BLD AUTO: 0.7 10E3/UL (ref 0–1.3)
MONOCYTES NFR BLD AUTO: 8 %
NEUTROPHILS # BLD AUTO: 5.3 10E3/UL (ref 1.6–8.3)
NEUTROPHILS NFR BLD AUTO: 57 %
NRBC # BLD AUTO: 0 10E3/UL
NRBC BLD AUTO-RTO: 0 /100
PHOSPHATE SERPL-MCNC: 3.8 MG/DL (ref 2.5–4.5)
PLATELET # BLD AUTO: 246 10E3/UL (ref 150–450)
POTASSIUM BLD-SCNC: 4 MMOL/L (ref 3.4–5.3)
RBC # BLD AUTO: 4.57 10E6/UL (ref 4.4–5.9)
SARS-COV-2 RNA RESP QL NAA+PROBE: NEGATIVE
SODIUM SERPL-SCNC: 142 MMOL/L (ref 133–144)
WBC # BLD AUTO: 9.1 10E3/UL (ref 4–11)

## 2021-12-02 PROCEDURE — 36415 COLL VENOUS BLD VENIPUNCTURE: CPT | Performed by: PHYSICIAN ASSISTANT

## 2021-12-02 PROCEDURE — 84100 ASSAY OF PHOSPHORUS: CPT | Performed by: PHYSICIAN ASSISTANT

## 2021-12-02 PROCEDURE — 97116 GAIT TRAINING THERAPY: CPT | Mod: GP | Performed by: PHYSICAL THERAPIST

## 2021-12-02 PROCEDURE — 97530 THERAPEUTIC ACTIVITIES: CPT | Mod: GP | Performed by: PHYSICAL THERAPIST

## 2021-12-02 PROCEDURE — 80048 BASIC METABOLIC PNL TOTAL CA: CPT | Performed by: PHYSICIAN ASSISTANT

## 2021-12-02 PROCEDURE — 250N000013 HC RX MED GY IP 250 OP 250 PS 637: Performed by: PSYCHIATRY & NEUROLOGY

## 2021-12-02 PROCEDURE — U0003 INFECTIOUS AGENT DETECTION BY NUCLEIC ACID (DNA OR RNA); SEVERE ACUTE RESPIRATORY SYNDROME CORONAVIRUS 2 (SARS-COV-2) (CORONAVIRUS DISEASE [COVID-19]), AMPLIFIED PROBE TECHNIQUE, MAKING USE OF HIGH THROUGHPUT TECHNOLOGIES AS DESCRIBED BY CMS-2020-01-R: HCPCS | Performed by: PSYCHIATRY & NEUROLOGY

## 2021-12-02 PROCEDURE — 124N000003 HC R&B MH SENIOR/ADOLESCENT

## 2021-12-02 PROCEDURE — 83735 ASSAY OF MAGNESIUM: CPT | Performed by: PHYSICIAN ASSISTANT

## 2021-12-02 PROCEDURE — 85025 COMPLETE CBC W/AUTO DIFF WBC: CPT | Performed by: PSYCHIATRY & NEUROLOGY

## 2021-12-02 PROCEDURE — 99233 SBSQ HOSP IP/OBS HIGH 50: CPT | Performed by: PSYCHIATRY & NEUROLOGY

## 2021-12-02 RX ADMIN — CLOZAPINE 200 MG: 100 TABLET ORAL at 20:32

## 2021-12-02 RX ADMIN — HALOPERIDOL 1 MG: 1 TABLET ORAL at 20:32

## 2021-12-02 RX ADMIN — BUSPIRONE HYDROCHLORIDE 10 MG: 10 TABLET ORAL at 20:32

## 2021-12-02 RX ADMIN — MIRTAZAPINE 15 MG: 15 TABLET, FILM COATED ORAL at 20:32

## 2021-12-02 RX ADMIN — ATORVASTATIN CALCIUM 80 MG: 80 TABLET, FILM COATED ORAL at 20:32

## 2021-12-02 RX ADMIN — GABAPENTIN 100 MG: 100 CAPSULE ORAL at 09:00

## 2021-12-02 RX ADMIN — GABAPENTIN 100 MG: 100 CAPSULE ORAL at 12:04

## 2021-12-02 RX ADMIN — ASPIRIN 81 MG CHEWABLE TABLET 81 MG: 81 TABLET CHEWABLE at 09:00

## 2021-12-02 RX ADMIN — MEMANTINE 10 MG: 10 TABLET ORAL at 09:00

## 2021-12-02 RX ADMIN — LEVOTHYROXINE SODIUM 88 MCG: 88 TABLET ORAL at 09:00

## 2021-12-02 RX ADMIN — BUSPIRONE HYDROCHLORIDE 10 MG: 10 TABLET ORAL at 14:53

## 2021-12-02 RX ADMIN — SALMETEROL XINAFOATE 1 PUFF: 50 POWDER, METERED ORAL; RESPIRATORY (INHALATION) at 09:01

## 2021-12-02 RX ADMIN — BUSPIRONE HYDROCHLORIDE 10 MG: 10 TABLET ORAL at 09:00

## 2021-12-02 RX ADMIN — GABAPENTIN 100 MG: 100 CAPSULE ORAL at 17:08

## 2021-12-02 RX ADMIN — MEGESTROL ACETATE 20 MG: 20 TABLET ORAL at 09:00

## 2021-12-02 RX ADMIN — MEMANTINE 10 MG: 10 TABLET ORAL at 20:32

## 2021-12-02 RX ADMIN — SALMETEROL XINAFOATE 1 PUFF: 50 POWDER, METERED ORAL; RESPIRATORY (INHALATION) at 20:32

## 2021-12-02 ASSESSMENT — ACTIVITIES OF DAILY LIVING (ADL)
ORAL_HYGIENE: INDEPENDENT;PROMPTS
DRESS: SCRUBS (BEHAVIORAL HEALTH);INDEPENDENT
HYGIENE/GROOMING: INDEPENDENT;PROMPTS
LAUNDRY: UNABLE TO COMPLETE

## 2021-12-02 NOTE — PROGRESS NOTES
"[facility], [place]   Psychiatric Progress Note        Interim History:   The patient's care was discussed with the treatment team during the daily team meeting and/or staff's chart notes were reviewed.  Staff report patient was responding to internal stimuli last night. BP has been low. Did drink 3/4 of his Ensure. Has been med compliant.      The patient states that he is \"fine.\" Denies problems with sleep. Initially says that he is not hungry and then says that he is and will eat some more of his breakfast. Agreeable to come to the lounge for lunch. Agreeable to shower today. Denies SI or AH. Asks if he is discharging today. When told no, he says, \"Thank you.\"         Medications:       aspirin  81 mg Oral Daily     atorvastatin  80 mg Oral At Bedtime     busPIRone  10 mg Oral TID     [Held by provider] cholecalciferol  1,250 mcg Oral Q7 Days     cloZAPine  200 mg Oral At Bedtime     gabapentin  100 mg Oral TID w/meals     haloperidol  1 mg Oral At Bedtime     levothyroxine  88 mcg Oral Daily     megestrol  20 mg Oral Daily     memantine  10 mg Oral BID     [Held by provider] metoprolol tartrate  25 mg Oral BID     mirtazapine  15 mg Oral At Bedtime     salmeterol  1 puff Inhalation BID          Allergies:     Allergies   Allergen Reactions     Ibuprofen      Latex           Labs:     Recent Results (from the past 24 hour(s))   Basic metabolic panel    Collection Time: 12/02/21  7:21 AM   Result Value Ref Range    Sodium 142 133 - 144 mmol/L    Potassium 4.0 3.4 - 5.3 mmol/L    Chloride 113 (H) 94 - 109 mmol/L    Carbon Dioxide (CO2) 21 20 - 32 mmol/L    Anion Gap 8 3 - 14 mmol/L    Urea Nitrogen 12 7 - 30 mg/dL    Creatinine 0.73 0.66 - 1.25 mg/dL    Calcium 9.0 8.5 - 10.1 mg/dL    Glucose 88 70 - 99 mg/dL    GFR Estimate >90 >60 mL/min/1.73m2   Magnesium    Collection Time: 12/02/21  7:21 AM   Result Value Ref Range    Magnesium 2.2 1.6 - 2.3 mg/dL   Phosphorus    Collection Time: " "12/02/21  7:21 AM   Result Value Ref Range    Phosphorus 3.8 2.5 - 4.5 mg/dL   CBC with platelets and differential    Collection Time: 12/02/21  7:21 AM   Result Value Ref Range    WBC Count 9.1 4.0 - 11.0 10e3/uL    RBC Count 4.57 4.40 - 5.90 10e6/uL    Hemoglobin 13.9 13.3 - 17.7 g/dL    Hematocrit 41.4 40.0 - 53.0 %    MCV 91 78 - 100 fL    MCH 30.4 26.5 - 33.0 pg    MCHC 33.6 31.5 - 36.5 g/dL    RDW 14.3 10.0 - 15.0 %    Platelet Count 246 150 - 450 10e3/uL    % Neutrophils 57 %    % Lymphocytes 14 %    % Monocytes 8 %    % Eosinophils 19 %    % Basophils 1 %    % Immature Granulocytes 1 %    NRBCs per 100 WBC 0 <1 /100    Absolute Neutrophils 5.3 1.6 - 8.3 10e3/uL    Absolute Lymphocytes 1.2 0.8 - 5.3 10e3/uL    Absolute Monocytes 0.7 0.0 - 1.3 10e3/uL    Absolute Eosinophils 1.7 (H) 0.0 - 0.7 10e3/uL    Absolute Basophils 0.1 0.0 - 0.2 10e3/uL    Absolute Immature Granulocytes 0.1 <=0.4 10e3/uL    Absolute NRBCs 0.0 10e3/uL          Psychiatric Examination:     BP 94/65   Pulse 96   Temp 97.4  F (36.3  C) (Temporal)   Resp 16   Ht 1.829 m (6')   Wt 77.6 kg (171 lb 1.6 oz)   SpO2 94%   BMI 23.21 kg/m    Weight is 171 lbs 1.6 oz  Body mass index is 23.21 kg/m .  Orthostatic Vitals  Report      Most Recent      Lying Orthostatic BP 75/58 11/12 0850    Lying Orthostatic Pulse (bpm) 84 11/12 0850            Appearance: awake, alert  Attitude:  more cooperative  Eye Contact:  good  Mood:  \"fine\"  Affect:  intensity is flat  Speech:  mumbling  Psychomotor Behavior:  no evidence of tardive dyskinesia, dystonia, or tics  Thought Process:  illogical  Associations:  no loose associations  Thought Content:  no evidence of suicidal ideation or homicidal ideation and obsessions present. Was responding to internal stimuli last night.   Insight:  limited  Judgement:  limited  Oriented to:  person and place  Attention Span and Concentration:  fair  Recent and Remote Memory:  poor    Clinical Global " Impressions  First:  Considering your total clinical experience with this particular patient population, how severe are the patient's symptoms at this time?: 7 (07/01/21 0630)  Compared to the patient's condition at the START of treatment, this patient's condition is: 4 (07/01/21 0630)  Most recent:  Considering your total clinical experience with this particular patient population, how severe are the patient's symptoms at this time?: 7 (11/09/21 1642)  Compared to the patient's condition at the START of treatment, this patient's condition is: 5 (11/09/21 1642)         Precautions:     Behavioral Orders   Procedures     Assault precautions     Cheeking Precautions (behavioral units)     Patient Observed swallowing PO medications; Patient asked to drink water after swallowing medication; Patient in Staff line of sight for 15 minutes after medication given; Mouth checks after PO administration (patient asked to open mouth and stick out their tongue).     Code 1     Code 2     With Security for any imaging/testing needed.     Elopement precautions     Fall precautions     Routine Programming     As clinically indicated     Single Room     Status 15     Every 15 minutes.          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   Stimulant use disorder, in remission.   Transaminitis, possibly medication induced   Hx of CVA  Vit D deficiency  Hypertension  COPD  Hx of infective endocarditis with severe mitral and aortic regurgitation s/p biprostehtic valve replacement 2015  Hx of HFrEF now recovered  Tobacco use disorder  Severe malnutrition in context of acute illness   Urinary incontinence  Constipation   Malaise  Prolonged QTc         Plan:     Assessment and hospital summary:  The patient is a 54yo male with a history of schizophrenia and TBI  and multiple medical co-morbidities as above who was admitted after being transferred from [facility] medical [facility] after a nearly year-long stay. Is currently under commitment with Yanez recently amended to include Clozaril. While at [facility] was noted to have ongoing agitation and frequently attempting to elope from unit requiring 1:1 staffing for safety. He was started on 1:1 staff upon transfer, this was discontinued as patient has been more cooperative without elopement attempts. IM consult completed on admission and continued to follow due to elevated LFTs. Haldol and VPA discontinued due to LFTs. Cross titration completed from risperidone to clozaril after Yanez amended. EPSE/TD movements noted, have appeared stable over past few weeks. Patient has continued to have disorganization and active psychosis symptoms which appear to be at patients baseline. Team continues to work on disposition which barriers include patient has no guardian or next of kin willing to act as surrogate decision maker, see CTC notes for complete details. Patient starting to have increasing symptoms noted week of 9/20, clozapine level ordered and noted to be lower than previous, have added cheeking precautions and increased clozapine dose on 9/24 and moved to split dosing given increasing afternoon agitation. Pt having decreased appetite, increased fatigue over weekend of 10/9-10/10, IM consult placed 10/11, KUB Xray showed large stool burden, bowel regimen modified. Pt was incontinent of stool evening of 10/13. Consolidated clozapine dose to bedtime given concern for daytime sedation with split dosing. Ongoing malaise noted starting 10/11, internal medicine has been contacted periodically. Noted 18# weight loss in 2 months, IM contacted for possible FTT on 11/4. Ethics consult placed 11/5. Neurology re-consulted 11/8.     12/2 - reached out to CTC lead for update on guardianship process.      Psychiatric  treatment/inteventions:  Medications:   -continue clozapine 200mg at bedtime  -continue Haldol 1mg Qday  -continue gabapentin 100mg TID  -continue Remeron 15mg at bedtime.   -continue cheeking precautions, started 9/27 given noted decompensation in recent weeks and lower clozapine level   -continue memantine at 5mg daily for neurocognitive disorder, dose was up to 10mg BID and have been reducing dose, may taper off as patient does not appear to have benefit and reporting oversedation and to reduce anticholinergic load  -continue risperdal 1mg Q6H PRN agitation   -continue lorazepam 0.5-1.0mg Q4H PRN anxiety or severe agitation     -discontinued benztropine 0.25mg BID on 10/21, tapered off to reduce anticholinergic load  -discontinued rivastigmine patch on 10/21,pt did not seemed to have benefit and to reduce anticholinergic load     -Ethics consult placed 11/5 due to patient lacking decision making capacity without guardian or surrogate decision maker and possible need for medical interventions including NJ/NG or PEG tube in future, appreciate assistance, care conference on 11/11    Discussed plan for NG tube with medicine and they are following this closely.   Continue Rahulce    Consulted podiatry for patient's overgrown fingernails. They are looking at options for trimming these.     Disposition Plan   Reason for ongoing admission: is unable to care for self due to severe psychosis or mariusz  Discharge location: group home  Discharge Medications: not ordered  Follow-up Appointments: not scheduled  Legal Status: full commitment    Entered by: Wilton Morfin on December 2, 2021 at 12:49 PM

## 2021-12-02 NOTE — PLAN OF CARE
Assessment/Intervention/Current Symptoms and Care Coordination:    Current Symptoms include the following: Confusion, delusional thinking, irritability, oriented to only self.    Chart Review    Met with team to discuss patient care.    Left a voicemail with Joi Zeng with [employer] ([phone]). Requested her to return a call; previous call was left for her by coverage [facility], 3 days ago. This is regarding guardianship.     Discharge Plan or Goal:  Pending stabilization & development of a safe discharge plan.  Considerations include: Nursing home     Barriers to Discharge:  Patient requires further psychiatric stabilization due to current symptomology, Medication management with possible adjustments and guardianship     Referral Status:  No new referrals made.     Legal Status:  Patient is under MI commitment in [facility] with [facility]. [id number]        Contacts:  Case Management Services:  [facility]   Kurtis Chapin. Phone: [phone]  Supervisor is [email or web address]     Freida Machuca  Sister  [phone]

## 2021-12-02 NOTE — PROGRESS NOTES
Brief Medicine Note:    IM following peripherally for monitoring of electrolytes given patient poor intake. [facility]. Will continue to trend M-Th labs and follow.    Yeimy Lawson PA-C  Internal Medicine [facility] Hospitalist  [facility]  Pager: [phone]

## 2021-12-02 NOTE — PLAN OF CARE
Patient irritable in conversation and told this writer to get out of his room.  BP 84/63 .  Asymptomatic.  Encouraged to drink more fluids.  Patient  denies depression or SI but states he is very anxious.  Appears to be responding to internal stimuli.  Patient states he is talking to himself.  Patient encouraged to drink more tonight but was only taking sips of water.  Last BP taken was 104/72 and patient is sound asleep.    Patient believes he is discharging tomorrow.  P:continue same plan of care.
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
## 2021-12-02 NOTE — PLAN OF CARE
Problem: Sleep Disturbance  Goal: Adequate Sleep/Rest  Outcome: Improving     Patient slept comfortably for 10.5  hours the whole night. No complaints nor concerns raised the whole shift.

## 2021-12-03 PROCEDURE — 250N000013 HC RX MED GY IP 250 OP 250 PS 637: Performed by: PSYCHIATRY & NEUROLOGY

## 2021-12-03 PROCEDURE — 99233 SBSQ HOSP IP/OBS HIGH 50: CPT | Performed by: PSYCHIATRY & NEUROLOGY

## 2021-12-03 PROCEDURE — 124N000003 HC R&B MH SENIOR/ADOLESCENT

## 2021-12-03 RX ADMIN — HALOPERIDOL 1 MG: 1 TABLET ORAL at 20:11

## 2021-12-03 RX ADMIN — MIRTAZAPINE 15 MG: 15 TABLET, FILM COATED ORAL at 20:11

## 2021-12-03 RX ADMIN — BUSPIRONE HYDROCHLORIDE 10 MG: 10 TABLET ORAL at 14:05

## 2021-12-03 RX ADMIN — LEVOTHYROXINE SODIUM 88 MCG: 88 TABLET ORAL at 08:55

## 2021-12-03 RX ADMIN — BUSPIRONE HYDROCHLORIDE 10 MG: 10 TABLET ORAL at 20:11

## 2021-12-03 RX ADMIN — MEGESTROL ACETATE 20 MG: 20 TABLET ORAL at 08:55

## 2021-12-03 RX ADMIN — ATORVASTATIN CALCIUM 80 MG: 80 TABLET, FILM COATED ORAL at 20:11

## 2021-12-03 RX ADMIN — GABAPENTIN 100 MG: 100 CAPSULE ORAL at 17:06

## 2021-12-03 RX ADMIN — MEMANTINE 10 MG: 10 TABLET ORAL at 20:11

## 2021-12-03 RX ADMIN — GABAPENTIN 100 MG: 100 CAPSULE ORAL at 14:05

## 2021-12-03 RX ADMIN — SALMETEROL XINAFOATE 1 PUFF: 50 POWDER, METERED ORAL; RESPIRATORY (INHALATION) at 14:05

## 2021-12-03 RX ADMIN — MEMANTINE 10 MG: 10 TABLET ORAL at 08:55

## 2021-12-03 RX ADMIN — BUSPIRONE HYDROCHLORIDE 10 MG: 10 TABLET ORAL at 08:55

## 2021-12-03 RX ADMIN — GABAPENTIN 100 MG: 100 CAPSULE ORAL at 08:55

## 2021-12-03 RX ADMIN — SALMETEROL XINAFOATE 1 PUFF: 50 POWDER, METERED ORAL; RESPIRATORY (INHALATION) at 20:11

## 2021-12-03 RX ADMIN — CLOZAPINE 200 MG: 100 TABLET ORAL at 20:11

## 2021-12-03 RX ADMIN — ASPIRIN 81 MG CHEWABLE TABLET 81 MG: 81 TABLET CHEWABLE at 08:55

## 2021-12-03 ASSESSMENT — ACTIVITIES OF DAILY LIVING (ADL)
LAUNDRY: UNABLE TO COMPLETE
ORAL_HYGIENE: PROMPTS;INDEPENDENT
HYGIENE/GROOMING: PROMPTS;INDEPENDENT
DRESS: SCRUBS (BEHAVIORAL HEALTH);INDEPENDENT

## 2021-12-03 NOTE — PLAN OF CARE
Assessment/Intervention/Current Symptoms and Care Coordination:    Current Symptoms include the following: Confusion, delusional thinking, irritability, oriented to only self.    Chart Review  Reviewed information yesterday regarding guardianship. Current status is that a firm is still looking for a guardian. Also messages have been left with a new source through a   Organization. Plan: Continue to seek contact with  Mental Health Consultant at [facility]. Met with the patient today; he had agreed to take a walk with this writer at 1:00pm, but when writer went for the walk to take place, the patient declined.     Discharge Plan or Goal:  Pending stabilization & development of a safe discharge plan.  Considerations include: Nursing home. Guardianship process is being pursued.     Barriers to Discharge:  Patient requires further psychiatric stabilization due to current symptomology, Medication management with possible adjustments and guardianship     Referral Status:  No new referrals made.     Legal Status:  Patient is under MI commitment in [facility] with [facility]. [id number]        Contacts:  Case Management Services:  [facility]   Kurtis Chapin. Phone: [phone]  Supervisor is [email or web address]     Freida Machuca  Sister  [phone]

## 2021-12-03 NOTE — PROGRESS NOTES
"[facility], [place]   Psychiatric Progress Note        Interim History:   The patient's care was discussed with the treatment team during the daily team meeting and/or staff's chart notes were reviewed.  Staff report patient did shower. Plans to go on a walk at 1pm. Didn't eat much breakfast.     The patient states that he is \"all right.\" Says that he didn't sleep that well. No appetite now but says that he will \"maybe\" come out for lunch. Denies SI. Plans to walk soon. Says that he did appreciate showering.          Medications:       aspirin  81 mg Oral Daily     atorvastatin  80 mg Oral At Bedtime     busPIRone  10 mg Oral TID     [Held by provider] cholecalciferol  1,250 mcg Oral Q7 Days     cloZAPine  200 mg Oral At Bedtime     gabapentin  100 mg Oral TID w/meals     haloperidol  1 mg Oral At Bedtime     levothyroxine  88 mcg Oral Daily     megestrol  20 mg Oral Daily     memantine  10 mg Oral BID     [Held by provider] metoprolol tartrate  25 mg Oral BID     mirtazapine  15 mg Oral At Bedtime     salmeterol  1 puff Inhalation BID          Allergies:     Allergies   Allergen Reactions     Ibuprofen      Latex           Labs:     Recent Results (from the past 24 hour(s))   Asymptomatic COVID-19 Virus (Coronavirus) by PCR Nasopharyngeal    Collection Time: 12/02/21  5:04 PM    Specimen: Nasopharyngeal; Swab   Result Value Ref Range    SARS CoV2 PCR Negative Negative          Psychiatric Examination:     BP (!) 84/65 (Patient Position: Other (comments))   Pulse 90   Temp 97.4  F (36.3  C) (Temporal)   Resp 16   Ht 1.829 m (6')   Wt 77.6 kg (171 lb 1.6 oz)   SpO2 95%   BMI 23.21 kg/m    Weight is 171 lbs 1.6 oz  Body mass index is 23.21 kg/m .  Orthostatic Vitals  Report      Most Recent      Lying Orthostatic BP 75/58 11/12 0850    Lying Orthostatic Pulse (bpm) 84 11/12 0850            Appearance: awake, alert  Attitude:  more cooperative  Eye Contact:  good  Mood:  " "\"fine\"  Affect:  intensity is flat but a little more full today  Speech:  mumbling but a little more clarity  Psychomotor Behavior:  no evidence of tardive dyskinesia, dystonia, or tics  Thought Process:  linear  Associations:  no loose associations  Thought Content:  no evidence of suicidal ideation or homicidal ideation and obsessions present.   Insight:  limited  Judgement:  limited  Oriented to:  person and place  Attention Span and Concentration:  fair  Recent and Remote Memory:  poor    Clinical Global Impressions  First:  Considering your total clinical experience with this particular patient population, how severe are the patient's symptoms at this time?: 7 (07/01/21 0630)  Compared to the patient's condition at the START of treatment, this patient's condition is: 4 (07/01/21 0630)  Most recent:  Considering your total clinical experience with this particular patient population, how severe are the patient's symptoms at this time?: 7 (11/09/21 1642)  Compared to the patient's condition at the START of treatment, this patient's condition is: 5 (11/09/21 1642)         Precautions:     Behavioral Orders   Procedures     Assault precautions     Cheeking Precautions (behavioral units)     Patient Observed swallowing PO medications; Patient asked to drink water after swallowing medication; Patient in Staff line of sight for 15 minutes after medication given; Mouth checks after PO administration (patient asked to open mouth and stick out their tongue).     Code 1     Code 2     With Security for any imaging/testing needed.     Elopement precautions     Fall precautions     Routine Programming     As clinically indicated     Single Room     Status 15     Every 15 minutes.          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   Stimulant " use disorder, in remission.   Transaminitis, possibly medication induced   Hx of CVA  Vit D deficiency  Hypertension  COPD  Hx of infective endocarditis with severe mitral and aortic regurgitation s/p biprostehtic valve replacement 2015  Hx of HFrEF now recovered  Tobacco use disorder  Severe malnutrition in context of acute illness   Urinary incontinence  Constipation   Malaise  Prolonged QTc         Plan:     Assessment and hospital summary:  The patient is a 54yo male with a history of schizophrenia and TBI and multiple medical co-morbidities as above who was admitted after being transferred from [facility] after a nearly year-long stay. Is currently under commitment with Yanez recently amended to include Clozaril. While at [facility] was noted to have ongoing agitation and frequently attempting to elope from unit requiring 1:1 staffing for safety. He was started on 1:1 staff upon transfer, this was discontinued as patient has been more cooperative without elopement attempts. IM consult completed on admission and continued to follow due to elevated LFTs. Haldol and VPA discontinued due to LFTs. Cross titration completed from risperidone to clozaril after Yanez amended. EPSE/TD movements noted, have appeared stable over past few weeks. Patient has continued to have disorganization and active psychosis symptoms which appear to be at patients baseline. Team continues to work on disposition which barriers include patient has no guardian or next of kin willing to act as surrogate decision maker, see CTC notes for complete details. Patient starting to have increasing symptoms noted week of 9/20, clozapine level ordered and noted to be lower than previous, have added cheeking precautions and increased clozapine dose on 9/24 and moved to split dosing given increasing afternoon agitation. Pt having decreased appetite, increased fatigue over weekend of 10/9-10/10, IM consult placed 10/11, KUB Xray showed large  stool burden, bowel regimen modified. Pt was incontinent of stool evening of 10/13. Consolidated clozapine dose to bedtime given concern for daytime sedation with split dosing. Ongoing malaise noted starting 10/11, internal medicine has been contacted periodically. Noted 18# weight loss in 2 months, IM contacted for possible FTT on 11/4. Ethics consult placed 11/5. Neurology re-consulted 11/8.     12/2 - reached out to CTC lead for update on guardianship process.      Psychiatric treatment/inteventions:  Medications:   -continue clozapine 200mg at bedtime  -continue Haldol 1mg Qday  -continue gabapentin 100mg TID  -continue Remeron 15mg at bedtime.   -continue cheeking precautions, started 9/27 given noted decompensation in recent weeks and lower clozapine level   -continue memantine at 5mg daily for neurocognitive disorder, dose was up to 10mg BID and have been reducing dose, may taper off as patient does not appear to have benefit and reporting oversedation and to reduce anticholinergic load  -continue risperdal 1mg Q6H PRN agitation   -continue lorazepam 0.5-1.0mg Q4H PRN anxiety or severe agitation     -discontinued benztropine 0.25mg BID on 10/21, tapered off to reduce anticholinergic load  -discontinued rivastigmine patch on 10/21,pt did not seemed to have benefit and to reduce anticholinergic load     -Ethics consult placed 11/5 due to patient lacking decision making capacity without guardian or surrogate decision maker and possible need for medical interventions including NJ/NG or PEG tube in future, appreciate assistance, care conference on 11/11    Discussed plan for NG tube with medicine and they are following this closely.   Continue Megace    Consulted podiatry for patient's overgrown fingernails. They are looking at options for trimming these.     Disposition Plan   Reason for ongoing admission: is unable to care for self due to severe psychosis or mariusz  Discharge location: group home  Discharge  Medications: not ordered  Follow-up Appointments: not scheduled  Legal Status: full commitment    Entered by: Wilton Morfin on December 3, 2021 at 11:20 AM

## 2021-12-03 NOTE — PLAN OF CARE
"Patient has been isolative to his room all shift. He has been laying in bed and only sits up for medications. His affect is flat. His mood is calm. He has been pleasant saying \"thank you\". He denies SI and SIB. He denies all MH symtoms. He denies pain. His speech is slow to react and mumbles. He refused to attend meetings, come out of his room and eat. He only took 3 bites of his magic cup with strong encouragement. His fluid intake was 740. He slept all shift. He was accepting to a covid test which came back negative. He voids in the toilet instead of the hat so urine out put not measured. He is medication compliant. He voices no concerns.  "

## 2021-12-03 NOTE — PLAN OF CARE
Problem: Sleep Disturbance  Goal: Adequate Sleep/Rest  Outcome: Improving    Patient slept comfortably the whole night without interruptions for 12.25 hours. No complaints made neither concerns raised.

## 2021-12-04 PROCEDURE — 124N000003 HC R&B MH SENIOR/ADOLESCENT

## 2021-12-04 PROCEDURE — 250N000013 HC RX MED GY IP 250 OP 250 PS 637: Performed by: PSYCHIATRY & NEUROLOGY

## 2021-12-04 RX ADMIN — GABAPENTIN 100 MG: 100 CAPSULE ORAL at 08:01

## 2021-12-04 RX ADMIN — ASPIRIN 81 MG CHEWABLE TABLET 81 MG: 81 TABLET CHEWABLE at 08:01

## 2021-12-04 RX ADMIN — BUSPIRONE HYDROCHLORIDE 10 MG: 10 TABLET ORAL at 20:31

## 2021-12-04 RX ADMIN — MEMANTINE 10 MG: 10 TABLET ORAL at 08:01

## 2021-12-04 RX ADMIN — GABAPENTIN 100 MG: 100 CAPSULE ORAL at 14:21

## 2021-12-04 RX ADMIN — LEVOTHYROXINE SODIUM 88 MCG: 88 TABLET ORAL at 08:01

## 2021-12-04 RX ADMIN — HALOPERIDOL 1 MG: 1 TABLET ORAL at 20:31

## 2021-12-04 RX ADMIN — GABAPENTIN 100 MG: 100 CAPSULE ORAL at 16:53

## 2021-12-04 RX ADMIN — ATORVASTATIN CALCIUM 80 MG: 80 TABLET, FILM COATED ORAL at 20:31

## 2021-12-04 RX ADMIN — MEGESTROL ACETATE 20 MG: 20 TABLET ORAL at 08:01

## 2021-12-04 RX ADMIN — BUSPIRONE HYDROCHLORIDE 10 MG: 10 TABLET ORAL at 14:21

## 2021-12-04 RX ADMIN — SALMETEROL XINAFOATE 1 PUFF: 50 POWDER, METERED ORAL; RESPIRATORY (INHALATION) at 08:03

## 2021-12-04 RX ADMIN — MIRTAZAPINE 15 MG: 15 TABLET, FILM COATED ORAL at 20:31

## 2021-12-04 RX ADMIN — MEMANTINE 10 MG: 10 TABLET ORAL at 20:31

## 2021-12-04 RX ADMIN — CLOZAPINE 200 MG: 100 TABLET ORAL at 20:31

## 2021-12-04 RX ADMIN — BUSPIRONE HYDROCHLORIDE 10 MG: 10 TABLET ORAL at 08:01

## 2021-12-04 RX ADMIN — SALMETEROL XINAFOATE 1 PUFF: 50 POWDER, METERED ORAL; RESPIRATORY (INHALATION) at 20:31

## 2021-12-04 ASSESSMENT — ACTIVITIES OF DAILY LIVING (ADL)
HYGIENE/GROOMING: PROMPTS;INDEPENDENT
DRESS: SCRUBS (BEHAVIORAL HEALTH)
LAUNDRY: UNABLE TO COMPLETE
ORAL_HYGIENE: PROMPTS
DRESS: PROMPTS;INDEPENDENT
HYGIENE/GROOMING: PROMPTS
LAUNDRY: UNABLE TO COMPLETE
ORAL_HYGIENE: PROMPTS;INDEPENDENT

## 2021-12-04 NOTE — PLAN OF CARE
BEHAVIORAL TEAM DISCUSSION     Participants: Wilton Mrofin MD; Joi Stearns OT; [facility];      Progress: Pt was transferred from [facility]. Assessment is ongoing to determine adjustments and implementations necessary for continued wellness. Noted for decline, physically.  Pt does not get out of bed.  Medical work- up has not yielded an origin of his malaise.     Anticipated length of stay:    one week - may be transferred to medicine.     Continued Stay Criteria/Rationale:   The pt is not able to care for himself        Medical/Physical: See medical for details  Needs 2 staff to transfer  Staff need to feed the pt     Precautions:           Behavioral Orders   Procedures     Assault precautions     Cheeking Precautions (behavioral units)       Patient Observed swallowing PO medications; Patient asked to drink water after swallowing medication; Patient in Staff line of sight for 15 minutes after medication given; Mouth checks after PO administration (patient asked to open mouth and stick out their tongue).     Code 1     Code 2       With Security for any imaging/testing needed.     Elopement precautions     Fall precautions     Routine Programming       As clinically indicated     Single Room     Status 15       Every 15 minutes.      Plan:   Help pt eat  Medication Management  Ethics consultations as needed  Medications:          aspirin  81 mg Oral Daily     atorvastatin  80 mg Oral At Bedtime     busPIRone  10 mg Oral TID     [Held by provider] cholecalciferol  1,250 mcg Oral Q7 Days     cloZAPine  200 mg Oral At Bedtime     gabapentin  100 mg Oral TID w/meals     haloperidol  1 mg Oral At Bedtime     levothyroxine  88 mcg Oral Daily     megestrol  20 mg Oral Daily     memantine  10 mg Oral BID     [Held by provider] metoprolol tartrate  25 mg Oral BID     mirtazapine  15 mg Oral At Bedtime     salmeterol  1 puff Inhalation BID        Pt will be encouraged to engaged in the therapeutic milieu  Pt  will be encouraged to attend groups to learn and practice positive skills to cope with his symptoms  Medical consultations will be implemented as needed  Care coordination will be implemented.

## 2021-12-04 NOTE — PLAN OF CARE
Problem: Sleep Disturbance  Goal: Adequate Sleep/Rest  Outcome: Improving     Patient slept comfortably for 13 hours the whole night. Nothing unusual noted. No complaints noted.

## 2021-12-04 NOTE — PLAN OF CARE
"Patient has been isolative and withdrawn. He has refused to come out of his room all shift. When encouraged to get up and come to the lounge he states \"I'm tired, I need to sleep\". His affect is flat. His mood is calm. He denies SI and SIB. He denies all MH symptoms. His fluid intake is adequate-see flow sheet. He drank one ensure this shift and refused to eat dinner. He voids in the toilet, not in the hat. He denies pain. He refused to attend groups. He is oriented to self only. His speech is mumbling and soft. He is medication compliant. He is confused. He makes statements stating people are picking him up to take him home. He appears disheveled and untidy. He took a shower yesterday.   "

## 2021-12-04 NOTE — PLAN OF CARE
"  Problem: Psychotic Symptoms  Goal: Psychotic Symptoms  Description: Signs and symptoms of listed problems will be absent or manageable.  Outcome: No Change  Goal: Social and Therapeutic (Psychotic Symptoms)  Description: Signs and symptoms of listed problems will be absent or manageable.  Outcome: No Change     Problem: Sleep Disturbance  Goal: Adequate Sleep/Rest  Outcome: No Change     Problem: Cognitive Impairment (Psychotic Signs/Symptoms)  Goal: Optimal Cognitive Function (Psychotic Signs/Symptoms)  Outcome: No Change  Intervention: Support and Promote Cognitive Ability  Recent Flowsheet Documentation  Taken 12/4/2021 0900 by Juarez Murphy RN  Trust Relationship/Rapport: care explained     Problem: Behavioral Disturbance  Goal: Behavioral Disturbance  Description: Signs and symptoms of listed problems will be absent or manageable by discharge or transition of care.  Outcome: No Change     Problem: Malnutrition  Goal: Improved Nutritional Intake  Outcome: No Change     Patient calm with flat affect. Isolative to room, when encouraged to come out for group or meals patient states \"I'm tired\". Patient did not eat breakfast but received 475 ml of TwoCalHN supplement. Patient is medication compliant, denies SI,SIB, denies mental health illness. Appearance is disheveled, patient refuses ADLs. Patient is voiding but urine is unmeasured at times as patient does not void in the hat consistently. Patient came out for lunch but only stayed a few minutes and had bites of his meal and then went back to room. Did no attend any groups.   "

## 2021-12-05 PROCEDURE — 250N000013 HC RX MED GY IP 250 OP 250 PS 637: Performed by: PSYCHIATRY & NEUROLOGY

## 2021-12-05 PROCEDURE — 124N000003 HC R&B MH SENIOR/ADOLESCENT

## 2021-12-05 RX ADMIN — MEMANTINE 10 MG: 10 TABLET ORAL at 08:16

## 2021-12-05 RX ADMIN — MIRTAZAPINE 15 MG: 15 TABLET, FILM COATED ORAL at 19:35

## 2021-12-05 RX ADMIN — HALOPERIDOL 1 MG: 1 TABLET ORAL at 19:35

## 2021-12-05 RX ADMIN — MEMANTINE 10 MG: 10 TABLET ORAL at 19:35

## 2021-12-05 RX ADMIN — BUSPIRONE HYDROCHLORIDE 10 MG: 10 TABLET ORAL at 19:35

## 2021-12-05 RX ADMIN — MEGESTROL ACETATE 20 MG: 20 TABLET ORAL at 08:16

## 2021-12-05 RX ADMIN — BUSPIRONE HYDROCHLORIDE 10 MG: 10 TABLET ORAL at 08:17

## 2021-12-05 RX ADMIN — GABAPENTIN 100 MG: 100 CAPSULE ORAL at 08:17

## 2021-12-05 RX ADMIN — CLOZAPINE 200 MG: 100 TABLET ORAL at 19:35

## 2021-12-05 RX ADMIN — SALMETEROL XINAFOATE 1 PUFF: 50 POWDER, METERED ORAL; RESPIRATORY (INHALATION) at 19:43

## 2021-12-05 RX ADMIN — ASPIRIN 81 MG CHEWABLE TABLET 81 MG: 81 TABLET CHEWABLE at 08:17

## 2021-12-05 RX ADMIN — GABAPENTIN 100 MG: 100 CAPSULE ORAL at 13:01

## 2021-12-05 RX ADMIN — ATORVASTATIN CALCIUM 80 MG: 80 TABLET, FILM COATED ORAL at 19:35

## 2021-12-05 RX ADMIN — LEVOTHYROXINE SODIUM 88 MCG: 88 TABLET ORAL at 08:16

## 2021-12-05 RX ADMIN — BUSPIRONE HYDROCHLORIDE 10 MG: 10 TABLET ORAL at 13:01

## 2021-12-05 RX ADMIN — SALMETEROL XINAFOATE 1 PUFF: 50 POWDER, METERED ORAL; RESPIRATORY (INHALATION) at 08:17

## 2021-12-05 RX ADMIN — GABAPENTIN 100 MG: 100 CAPSULE ORAL at 17:13

## 2021-12-05 ASSESSMENT — ACTIVITIES OF DAILY LIVING (ADL)
HYGIENE/GROOMING: PROMPTS;INDEPENDENT
ORAL_HYGIENE: PROMPTS;INDEPENDENT
DRESS: SCRUBS (BEHAVIORAL HEALTH);INDEPENDENT
HYGIENE/GROOMING: PROMPTS;INDEPENDENT
DRESS: PROMPTS;INDEPENDENT
LAUNDRY: UNABLE TO COMPLETE
LAUNDRY: UNABLE TO COMPLETE
ORAL_HYGIENE: PROMPTS;INDEPENDENT

## 2021-12-05 NOTE — PLAN OF CARE
"Patient has been isolative and withdrawn. He has refused to come out of his room all shift. When encouraged to get up and come to the lounge he states \"I'm tired, I need to sleep\". His affect is flat. His mood is calm. He denies SI and SIB. He denies all MH symptoms. His fluid intake is adequate-see flow sheet. He stated he was hungary and wanted a peanut butter and jelly sandwich and corn flakes. He ate 4 bites of his sandwich and stated \"I will try to eat the cereal\". He voids in the toilet, not in the hat. He denies pain. He refused to attend groups. He is oriented to self only. His speech is mumbling and soft. He is medication compliant.  He appears disheveled and untidy. He refused to take a shower. He voices no concerns.          "
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
## 2021-12-05 NOTE — PLAN OF CARE
Problem: Sleep Disturbance  Goal: Adequate Sleep/Rest  Outcome: Improving    Patient slept comfortably the whole night for 10.5  hours. No complaints made.

## 2021-12-06 ENCOUNTER — APPOINTMENT (OUTPATIENT)
Dept: PHYSICAL THERAPY | Facility: CLINIC | Age: 56
DRG: 885 | End: 2021-12-06
Attending: PSYCHIATRY & NEUROLOGY
Payer: COMMERCIAL

## 2021-12-06 LAB
ANION GAP SERPL CALCULATED.3IONS-SCNC: 7 MMOL/L (ref 3–14)
BUN SERPL-MCNC: 13 MG/DL (ref 7–30)
CALCIUM SERPL-MCNC: 9.1 MG/DL (ref 8.5–10.1)
CHLORIDE BLD-SCNC: 114 MMOL/L (ref 94–109)
CO2 SERPL-SCNC: 22 MMOL/L (ref 20–32)
CREAT SERPL-MCNC: 0.68 MG/DL (ref 0.66–1.25)
GFR SERPL CREATININE-BSD FRML MDRD: >90 ML/MIN/1.73M2
GLUCOSE BLD-MCNC: 91 MG/DL (ref 70–99)
MAGNESIUM SERPL-MCNC: 2.3 MG/DL (ref 1.6–2.3)
PHOSPHATE SERPL-MCNC: 4 MG/DL (ref 2.5–4.5)
POTASSIUM BLD-SCNC: 3.6 MMOL/L (ref 3.4–5.3)
SODIUM SERPL-SCNC: 143 MMOL/L (ref 133–144)

## 2021-12-06 PROCEDURE — 124N000003 HC R&B MH SENIOR/ADOLESCENT

## 2021-12-06 PROCEDURE — 83735 ASSAY OF MAGNESIUM: CPT | Performed by: PHYSICIAN ASSISTANT

## 2021-12-06 PROCEDURE — 97530 THERAPEUTIC ACTIVITIES: CPT | Mod: GP | Performed by: PHYSICAL THERAPIST

## 2021-12-06 PROCEDURE — 84100 ASSAY OF PHOSPHORUS: CPT | Performed by: PHYSICIAN ASSISTANT

## 2021-12-06 PROCEDURE — 36415 COLL VENOUS BLD VENIPUNCTURE: CPT | Performed by: PHYSICIAN ASSISTANT

## 2021-12-06 PROCEDURE — 99233 SBSQ HOSP IP/OBS HIGH 50: CPT | Performed by: PSYCHIATRY & NEUROLOGY

## 2021-12-06 PROCEDURE — 80048 BASIC METABOLIC PNL TOTAL CA: CPT | Performed by: PHYSICIAN ASSISTANT

## 2021-12-06 PROCEDURE — 250N000013 HC RX MED GY IP 250 OP 250 PS 637: Performed by: PSYCHIATRY & NEUROLOGY

## 2021-12-06 RX ADMIN — SALMETEROL XINAFOATE 1 PUFF: 50 POWDER, METERED ORAL; RESPIRATORY (INHALATION) at 08:14

## 2021-12-06 RX ADMIN — ASPIRIN 81 MG CHEWABLE TABLET 81 MG: 81 TABLET CHEWABLE at 08:16

## 2021-12-06 RX ADMIN — GABAPENTIN 100 MG: 100 CAPSULE ORAL at 17:34

## 2021-12-06 RX ADMIN — HALOPERIDOL 1 MG: 1 TABLET ORAL at 20:00

## 2021-12-06 RX ADMIN — ATORVASTATIN CALCIUM 80 MG: 80 TABLET, FILM COATED ORAL at 20:00

## 2021-12-06 RX ADMIN — LEVOTHYROXINE SODIUM 88 MCG: 88 TABLET ORAL at 08:16

## 2021-12-06 RX ADMIN — SALMETEROL XINAFOATE 1 PUFF: 50 POWDER, METERED ORAL; RESPIRATORY (INHALATION) at 20:02

## 2021-12-06 RX ADMIN — BUSPIRONE HYDROCHLORIDE 10 MG: 10 TABLET ORAL at 08:16

## 2021-12-06 RX ADMIN — BUSPIRONE HYDROCHLORIDE 10 MG: 10 TABLET ORAL at 13:46

## 2021-12-06 RX ADMIN — MEMANTINE 10 MG: 10 TABLET ORAL at 08:16

## 2021-12-06 RX ADMIN — MEMANTINE 10 MG: 10 TABLET ORAL at 20:00

## 2021-12-06 RX ADMIN — GABAPENTIN 100 MG: 100 CAPSULE ORAL at 13:46

## 2021-12-06 RX ADMIN — MIRTAZAPINE 15 MG: 15 TABLET, FILM COATED ORAL at 20:00

## 2021-12-06 RX ADMIN — MEGESTROL ACETATE 20 MG: 20 TABLET ORAL at 08:16

## 2021-12-06 RX ADMIN — GABAPENTIN 100 MG: 100 CAPSULE ORAL at 08:16

## 2021-12-06 RX ADMIN — BUSPIRONE HYDROCHLORIDE 10 MG: 10 TABLET ORAL at 20:00

## 2021-12-06 RX ADMIN — CLOZAPINE 200 MG: 100 TABLET ORAL at 20:00

## 2021-12-06 ASSESSMENT — ACTIVITIES OF DAILY LIVING (ADL)
DRESS: SCRUBS (BEHAVIORAL HEALTH)
ORAL_HYGIENE: PROMPTS;INDEPENDENT
LAUNDRY: UNABLE TO COMPLETE
HYGIENE/GROOMING: PROMPTS;INDEPENDENT
HYGIENE/GROOMING: PROMPTS;INDEPENDENT
DRESS: SCRUBS (BEHAVIORAL HEALTH)
ORAL_HYGIENE: PROMPTS;INDEPENDENT

## 2021-12-06 ASSESSMENT — MIFFLIN-ST. JEOR: SCORE: 1615.97

## 2021-12-06 NOTE — PLAN OF CARE
Physical Therapy Discharge Summary    Reason for therapy discharge:    All goals and outcomes met, no further needs identified.    Progress towards therapy goal(s). See goals on Care Plan in [facility] electronic health record for goal details.  Goals met    Therapy recommendation(s):    Continued therapy is recommended.  Rationale/Recommendations:  to continue to increase patients strength and endurance. .

## 2021-12-06 NOTE — PLAN OF CARE
Patient slept comfortably for 11 hours, uninterrupted. No complaints made. He has not gone to the toilet yet neither drank fluids.

## 2021-12-06 NOTE — PROGRESS NOTES
"[facility], [place]   Psychiatric Progress Note        Interim History:   The patient's care was discussed with the treatment team during the daily team meeting and/or staff's chart notes were reviewed.  Staff report patient did shower. Was delusional yesterday. Has been drinking some but eating little.     The patient states that he is \"fine.\" Says that the weekend was \"fine.\" Denies problems with sleep. Reports that his appetite is good. Denies SI. Denies AH. Encouraged to continue to come [place] [facility] [place] to [place] and drink as much as he can. Patient agreeable.          Medications:       aspirin  81 mg Oral Daily     atorvastatin  80 mg Oral At Bedtime     busPIRone  10 mg Oral TID     [Held by provider] cholecalciferol  1,250 mcg Oral Q7 Days     cloZAPine  200 mg Oral At Bedtime     gabapentin  100 mg Oral TID w/meals     haloperidol  1 mg Oral At Bedtime     levothyroxine  88 mcg Oral Daily     megestrol  20 mg Oral Daily     memantine  10 mg Oral BID     [Held by provider] metoprolol tartrate  25 mg Oral BID     mirtazapine  15 mg Oral At Bedtime     salmeterol  1 puff Inhalation BID          Allergies:     Allergies   Allergen Reactions     Ibuprofen      Latex           Labs:     Recent Results (from the past 24 hour(s))   Basic metabolic panel    Collection Time: 12/06/21  8:17 AM   Result Value Ref Range    Sodium 143 133 - 144 mmol/L    Potassium 3.6 3.4 - 5.3 mmol/L    Chloride 114 (H) 94 - 109 mmol/L    Carbon Dioxide (CO2) 22 20 - 32 mmol/L    Anion Gap 7 3 - 14 mmol/L    Urea Nitrogen 13 7 - 30 mg/dL    Creatinine 0.68 0.66 - 1.25 mg/dL    Calcium 9.1 8.5 - 10.1 mg/dL    Glucose 91 70 - 99 mg/dL    GFR Estimate >90 >60 mL/min/1.73m2   Magnesium    Collection Time: 12/06/21  8:17 AM   Result Value Ref Range    Magnesium 2.3 1.6 - 2.3 mg/dL   Phosphorus    Collection Time: 12/06/21  8:17 AM   Result Value Ref Range    Phosphorus 4.0 2.5 - 4.5 mg/dL          " "Psychiatric Examination:     BP 97/67   Pulse 101   Temp 97.2  F (36.2  C) (Temporal)   Resp 16   Ht 1.829 m (6')   Wt 77.6 kg (171 lb 1.6 oz)   SpO2 97%   BMI 23.21 kg/m    Weight is 171 lbs 1.6 oz  Body mass index is 23.21 kg/m .  Orthostatic Vitals  Report      Most Recent      Lying Orthostatic BP 75/58 11/12 0850    Lying Orthostatic Pulse (bpm) 84 11/12 0850            Appearance: awake, alert  Attitude:  more cooperative  Eye Contact:  good  Mood:  \"fine\"  Affect:  intensity is flat  Speech:  mumbling but more engaged  Psychomotor Behavior:  no evidence of tardive dyskinesia, dystonia, or tics  Thought Process:  linear  Associations:  no loose associations  Thought Content:  no evidence of suicidal ideation or homicidal ideation and obsessions present.   Insight:  limited  Judgement:  limited  Oriented to:  person and place  Attention Span and Concentration:  fair  Recent and Remote Memory:  poor    Clinical Global Impressions  First:  Considering your total clinical experience with this particular patient population, how severe are the patient's symptoms at this time?: 7 (07/01/21 0630)  Compared to the patient's condition at the START of treatment, this patient's condition is: 4 (07/01/21 0630)  Most recent:  Considering your total clinical experience with this particular patient population, how severe are the patient's symptoms at this time?: 7 (11/09/21 1642)  Compared to the patient's condition at the START of treatment, this patient's condition is: 5 (11/09/21 1642)         Precautions:     Behavioral Orders   Procedures     Assault precautions     Cheeking Precautions (behavioral units)     Patient Observed swallowing PO medications; Patient asked to drink water after swallowing medication; Patient in Staff line of sight for 15 minutes after medication given; Mouth checks after PO administration (patient asked to open mouth and stick out their tongue).     Code 1     Code 2     With Security for " any imaging/testing needed.     Elopement precautions     Fall precautions     Routine Programming     As clinically indicated     Single Room     Status 15     Every 15 minutes.          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   Stimulant use disorder, in remission.   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 after a nearly year-long stay. Is currently under commitment with Yanez recently amended to include Clozaril. While at [facility] was noted to have ongoing agitation and frequently attempting to elope from unit requiring 1:1 staffing for safety. He was started on 1:1 staff upon transfer, this was discontinued as patient has been more cooperative without elopement attempts. IM consult completed on admission and continued to follow due to elevated LFTs. Haldol and VPA discontinued due to LFTs. Cross titration completed from risperidone to clozaril after Yanez amended. EPSE/TD movements noted, have appeared stable over past few weeks. Patient has continued to have disorganization and active psychosis symptoms which appear to be at patients baseline. Team continues to work on disposition which barriers include patient has no guardian or next of kin willing to act as surrogate decision  maker, see CTC notes for complete details. Patient starting to have increasing symptoms noted week of 9/20, clozapine level ordered and noted to be lower than previous, have added cheeking precautions and increased clozapine dose on 9/24 and moved to split dosing given increasing afternoon agitation. Pt having decreased appetite, increased fatigue over weekend of 10/9-10/10, IM consult placed 10/11, KUB Xray showed large stool burden, bowel regimen modified. Pt was incontinent of stool evening of 10/13. Consolidated clozapine dose to bedtime given concern for daytime sedation with split dosing. Ongoing malaise noted starting 10/11, internal medicine has been contacted periodically. Noted 18# weight loss in 2 months, IM contacted for possible FTT on 11/4. Ethics consult placed 11/5. Neurology re-consulted 11/8.     12/2 - reached out to CTC lead for update on guardianship process.      Psychiatric treatment/inteventions:  Medications:   -continue clozapine 200mg at bedtime  -continue Haldol 1mg Qday  -continue gabapentin 100mg TID  -continue Remeron 15mg at bedtime.   -continue cheeking precautions, started 9/27 given noted decompensation in recent weeks and lower clozapine level   -continue memantine at 5mg daily for neurocognitive disorder, dose was up to 10mg BID and have been reducing dose, may taper off as patient does not appear to have benefit and reporting oversedation and to reduce anticholinergic load  -continue risperdal 1mg Q6H PRN agitation   -continue lorazepam 0.5-1.0mg Q4H PRN anxiety or severe agitation     -discontinued benztropine 0.25mg BID on 10/21, tapered off to reduce anticholinergic load  -discontinued rivastigmine patch on 10/21,pt did not seemed to have benefit and to reduce anticholinergic load     -Ethics consult placed 11/5 due to patient lacking decision making capacity without guardian or surrogate decision maker and possible need for medical interventions including NJ/NG or PEG tube  in future, appreciate assistance, care conference on 11/11    Discussed plan for NG tube with medicine and they are following this closely.   Continue Megace    Consulted podiatry for patient's overgrown fingernails. They are looking at options for trimming these.     Disposition Plan   Reason for ongoing admission: is unable to care for self due to severe psychosis or mariusz  Discharge location: group home  Discharge Medications: not ordered  Follow-up Appointments: not scheduled  Legal Status: full commitment    Entered by: Wilton Morfin on December 6, 2021 at 10:24 AM

## 2021-12-06 NOTE — PLAN OF CARE
"  Problem: Psychotic Symptoms  Goal: Psychotic Symptoms  Description: Signs and symptoms of listed problems will be absent or manageable.  Outcome: No Change  Goal: Social and Therapeutic (Psychotic Symptoms)  Description: Signs and symptoms of listed problems will be absent or manageable.  Outcome: No Change     Problem: Cognitive Impairment (Psychotic Signs/Symptoms)  Goal: Optimal Cognitive Function (Psychotic Signs/Symptoms)  Outcome: No Change  Intervention: Support and Promote Cognitive Ability  Recent Flowsheet Documentation  Taken 12/6/2021 1000 by Juarez Murphy RN  Trust Relationship/Rapport:   care explained   choices provided   emotional support provided   empathic listening provided   questions answered   questions encouraged   reassurance provided   thoughts/feelings acknowledged     Problem: Behavioral Disturbance  Goal: Behavioral Disturbance  Description: Signs and symptoms of listed problems will be absent or manageable by discharge or transition of care.  Outcome: No Change     Problem: Malnutrition  Goal: Improved Nutritional Intake  Outcome: Improving     Patient isolative to room this shift, did not get out of room at all. Mood Irritable when asked to come out for meals or groups. Patient reports \"I'm tired\" I need to sleep. Affect flat and blunted. Patient denies mental health illness, no observed AH/VH this shift. Patient had 500ml of fluid intake this shift and 200 ml measured output and urine in toilet. Patient had BMP mg+ and phosphorus labs today. Dietary called and informed staff of discontinuation of supplements daily, they are still available prn when patient is out, there are many in the OT gym refrigerator.  "

## 2021-12-06 NOTE — PROGRESS NOTES
CLINICAL NUTRITION SERVICES - REASSESSMENT NOTE     Nutrition Prescription    RECOMMENDATIONS FOR MDs/PROVIDERS TO ORDER:  Please note continued weight loss, pt has lost an additional 5 lbs in the last 2 week, 30 lbs since admit to facility. If it were decided it was deemed appropriate and or pt agreeing to consider nutrition support, please consult RD services for this.     Malnutrition Status:    Severe malnutrition in the context of acute on chronic illness or injury     Recommendations already ordered by Registered Dietitian (RD):  RD updated supplement orders to one order: Nursing staff can call for any supplement at any time, but will hold auto sending supplements at present due to excess stock on the unit    Future/Additional Recommendations:  Continue to monitor meal/supplement intakes and update orders as needed  Continue to monitor weight/lab trends as available      EVALUATION OF THE PROGRESS TOWARD GOALS   Diet: Mechanical/Dental Soft - Gatorade with all meals   Supplement: Chocolate Ensure Enlive BID between meals, Two Cornel TID at all meals, Magic Cup BID at lunch and dinner   Intake: 0-25% per flow sheets, staff notates bites often, nursing notes indicate staff is pushing between meal fluids and supplements throughout the day        NEW FINDINGS   MAR reviewed, noted pt is on Remeron and Megace. Labs reviewed. Past Provider notes reviewed and Ethics team recommendations noted, pt declined feeding tube placement previously. RD called unit and spoke with bedside RN, reviewed supplement orders noting multiple orders, staff confirm large amount of supplement stock on unit, so agreeing for RD to update orders to PRN only to allow staff to use up stock on floor. RN reports pt does take supplements fairly well with medication administration and at times between meals, still continues to have poor po intakes of foods at meals. Reviewed no new weight in 2 weeks, RN was able to obtain new weight this afternoon.       12/06/21 1348 75.3 kg (166 lb)   11/23/21 0839 77.6 kg (171 lb 1.6 oz)   11/06/21 1100 82.2 kg (181 lb 3.5 oz)   09/05/21 0853 90.1 kg (198 lb 9.6 oz)   08/08/21 0932 89.8 kg (197 lb 14.4 oz)   07/04/21 0836 87.5 kg (192 lb 14.4 oz)   06/30/21 2025 89.1 kg (196 lb 8 oz)     Wt Readings from Last 20 Encounters:   11/23/21 77.6 kg (171 lb 1.6 oz)   03/31/21 84.2 kg (185 lb 9.6 oz)   08/26/20 70.3 kg (155 lb)     Weight assessment: 5 lb wt loss in 2 weeks, significant 8.3% weight loss in 1 month, significant 16.2% weight loss in 3 months, 30 lb wt loss since admission to facility.     MALNUTRITION  % Intake: </=75% for >/= 1 month (severe)  % Weight Loss: >5% weight loss in 1 month (severe)  Subcutaneous Fat Loss: Not assessed   Muscle Loss: Not assessed   Fluid Accumulation/Edema: None noted  Malnutrition Diagnosis: Severe malnutrition in the context of acute on chronic illness or injury    Previous Goals   Patient to consume % of nutritionally adequate meal trays TID, or the equivalent with supplements/snacks  Evaluation: Not met    Previous Nutrition Diagnosis  Inadequate oral intake of unclear etiology (menu fatigue, mental healthy) as evidenced by poor intake and severe weight loss   Evaluation: No change    CURRENT NUTRITION DIAGNOSIS  Inadequate oral intake of unclear etiology (menu fatigue, mental healthy) as evidenced by poor intake and severe weight loss     INTERVENTIONS  Implementation  Medical food supplement therapy - orders adjusted to above due to excess stock on unit     Goals  Patient to consume % of nutritionally adequate meal trays TID, or the equivalent with supplements/snacks.    Monitoring/Evaluation  Progress toward goals will be monitored and evaluated per protocol.    Emily Manzo RD, CNSC, LD  [facility] Pager: [phone]

## 2021-12-06 NOTE — PLAN OF CARE
Assessment/Intervention/Current Symptoms and Care Coordination:    Current Symptoms include the following: Confusion, delusional thinking, irritability, oriented to only self.    Chart Review  Reviewed information yesterday regarding guardianship. Current status is that a firm is still looking for a guardian. Also messages have been left with a new source through a   Organization. Plan: Continue to seek contact with  Mental Health Consultant at [facility].     Discharge Plan or Goal:  Pending stabilization & development of a safe discharge plan.  Considerations include: Nursing home. Guardianship process is being pursued.     Barriers to Discharge:  Patient requires further psychiatric stabilization due to current symptomology, Medication management with possible adjustments and guardianship     Referral Status:  No new referrals made.     Legal Status:  Patient is under MI commitment in [facility] with Yanez. [id number]        Contacts:  Case Management Services:  [facility]   Kurtis Chapin. Phone: [phone]  Supervisor is [email or web address]     Fredia Machuca  Sister  [phone]

## 2021-12-06 NOTE — PLAN OF CARE
Patient only came out of his room for ReDigi and to watch a movie. He only stayed a short time for the movie then went back to his room. His affect is flat. His mood is labile. He gets irritated when he is encouraged to eat, shower, go to groups and drink more fluids. He only ate bites of his dinner and had popcorn for snack. His fluid intake is adequate. He denies SI and SIB. He denies all MH symptoms. He denies pain. He took a shower today. He voids without difficulty. Urine output is not measured due to voiding around the hat. He voices no concerns.

## 2021-12-07 PROCEDURE — 124N000003 HC R&B MH SENIOR/ADOLESCENT

## 2021-12-07 PROCEDURE — 250N000013 HC RX MED GY IP 250 OP 250 PS 637: Performed by: PSYCHIATRY & NEUROLOGY

## 2021-12-07 PROCEDURE — 99233 SBSQ HOSP IP/OBS HIGH 50: CPT | Performed by: PSYCHIATRY & NEUROLOGY

## 2021-12-07 RX ADMIN — GABAPENTIN 100 MG: 100 CAPSULE ORAL at 17:27

## 2021-12-07 RX ADMIN — MIRTAZAPINE 15 MG: 15 TABLET, FILM COATED ORAL at 19:58

## 2021-12-07 RX ADMIN — CLOZAPINE 200 MG: 100 TABLET ORAL at 19:58

## 2021-12-07 RX ADMIN — SALMETEROL XINAFOATE 1 PUFF: 50 POWDER, METERED ORAL; RESPIRATORY (INHALATION) at 09:26

## 2021-12-07 RX ADMIN — MEGESTROL ACETATE 20 MG: 20 TABLET ORAL at 09:25

## 2021-12-07 RX ADMIN — ATORVASTATIN CALCIUM 80 MG: 80 TABLET, FILM COATED ORAL at 19:58

## 2021-12-07 RX ADMIN — ASPIRIN 81 MG CHEWABLE TABLET 81 MG: 81 TABLET CHEWABLE at 09:25

## 2021-12-07 RX ADMIN — GABAPENTIN 100 MG: 100 CAPSULE ORAL at 13:59

## 2021-12-07 RX ADMIN — SALMETEROL XINAFOATE 1 PUFF: 50 POWDER, METERED ORAL; RESPIRATORY (INHALATION) at 19:59

## 2021-12-07 RX ADMIN — LEVOTHYROXINE SODIUM 88 MCG: 88 TABLET ORAL at 09:26

## 2021-12-07 RX ADMIN — BUSPIRONE HYDROCHLORIDE 10 MG: 10 TABLET ORAL at 13:59

## 2021-12-07 RX ADMIN — GABAPENTIN 100 MG: 100 CAPSULE ORAL at 09:26

## 2021-12-07 RX ADMIN — MEMANTINE 10 MG: 10 TABLET ORAL at 09:25

## 2021-12-07 RX ADMIN — MEMANTINE 10 MG: 10 TABLET ORAL at 19:58

## 2021-12-07 RX ADMIN — BUSPIRONE HYDROCHLORIDE 10 MG: 10 TABLET ORAL at 19:58

## 2021-12-07 RX ADMIN — BUSPIRONE HYDROCHLORIDE 10 MG: 10 TABLET ORAL at 09:26

## 2021-12-07 RX ADMIN — HALOPERIDOL 1 MG: 1 TABLET ORAL at 19:58

## 2021-12-07 ASSESSMENT — ACTIVITIES OF DAILY LIVING (ADL)
ORAL_HYGIENE: PROMPTS
HYGIENE/GROOMING: PROMPTS
DRESS: SCRUBS (BEHAVIORAL HEALTH)
ORAL_HYGIENE: PROMPTS
DRESS: SCRUBS (BEHAVIORAL HEALTH);PROMPTS
HYGIENE/GROOMING: PROMPTS
LAUNDRY: UNABLE TO COMPLETE

## 2021-12-07 ASSESSMENT — MIFFLIN-ST. JEOR: SCORE: 1617.33

## 2021-12-07 NOTE — PLAN OF CARE
Patient remains very isolative refusing to come out eat tonight.  Ate only the pineapple and drank H20 and the nutritional beverage. Denies any mental health symptoms.  Irritates easily.   Came out to the lounge for about 20-30 minutes tonight with encouragement. Drank 1/2 of boost. Wondering when the dr will be here to discharge him.  No cheeking of pills noted tonight.  P:  Continue same plan of care.

## 2021-12-07 NOTE — PLAN OF CARE
"Assessment/Intervention/Current Symptoms and Care Coordination:    Current Symptoms include the following: Confusion, delusional thinking, irritability, oriented to only self, and to other.    Chart Review    Met with this patient today. Went to his room and requested to take a walk with him. The patient initially declined and swore at this writer. The writer then went towards his bed and requested to see him in the \"office\" in front. The patient agreed. Writer requested staff assistance (from a psych associate) and pt walked to the office across by the [facility] area. On getting to the room, writer asked what writer could do for him. Pt requested a cup of coffee and a cigarette. Writer informed him he will get the coffee (decafe) but will tell the doctor about the cigarette later. Staff provided a cup of coffee and pt was able to take a few sips. Writer engaged him in an informal discussion related to coffee and the way that coffee makes some people to shake. Pt brought two of his hands and showed writer that he was not shaking. He was able to engage in conversations around how many cups of coffee he usually has in a day. Noticeably, the patient had periods whereby he was responding to internal stimuli in the context of looking at a wall and speaking in an incomprehensible manner. When asked what he was doing, he replied: \"I am speaking to my sister.\" Pt did request another cup of coffee and it was provided. He had a brief conversation with writer after taking a few sips of the coffee, and requested to go back to his room.       Discharge Plan or Goal:  Pending stabilization & development of a safe discharge plan.  Considerations include: Nursing home. Guardianship process is being pursued.     Barriers to Discharge:  Patient requires further psychiatric stabilization due to current symptomology, Medication management with possible adjustments and guardianship     Referral Status:  No new referrals made.     Legal " Status:  Patient is under MI commitment in [facility] with Beau. [id number]        Contacts:  Case Management Services:  [facility]   Kurtis Chapin. Phone: [phone]  Supervisor is [email or web address]     Freida Talley  [phone]

## 2021-12-07 NOTE — PLAN OF CARE
"Problem: Psychotic Symptoms  Goal: Psychotic Symptoms  Outcome: No Change  Goal: Social and Therapeutic (Psychotic Symptoms)  Outcome: No Change     Patient is withdrawn and isolative.  Patient in room this shift despite multiple attempts to encourage patient to come out to milieu. Has poor insight to mental illness.  Asks to smoke cigarettes and to leave hospital.  States, \"I'm going home today there is nothing wrong with me.\"  Patient somewhat redirectable but does continue to ask about leaving throughout the day.  He has poor appetite.  Patient needs encouragement to eat and drink.  Despite encouragement, patient refused breakfast and ate about 25% of lunch.  Total fluid intake this shift was about 360 mL.  Output 175 mL.  BP was low this morning at 81/59.  Recheck this afternoon was 123/89.  Patient is medication compliant and remains safe on unit.  Will continue to monitor.   [place] RN   "

## 2021-12-07 NOTE — PROGRESS NOTES
"[facility], [place]   Psychiatric Progress Note        Interim History:   The patient's care was discussed with the treatment team during the daily team meeting and/or staff's chart notes were reviewed.  Staff report patient has lost another 5 pounds in the past two weeks. Discussed in outlier meeting and some slight progress towards guardianship and subsequent placement.     The patient states that he is \"fine.\" Says that his sleep was \"all right.\" Not eating well and reports low appetite. Discussed that he can help his case by eating and drinking more and patient agreeable to try. Denies SI. Denies AH. Discussed SNF placement and patient says, \"What do I need a nursing home for?\" He does report that he will try and be more independent with eating and his cares.          Medications:       aspirin  81 mg Oral Daily     atorvastatin  80 mg Oral At Bedtime     busPIRone  10 mg Oral TID     [Held by provider] cholecalciferol  1,250 mcg Oral Q7 Days     cloZAPine  200 mg Oral At Bedtime     gabapentin  100 mg Oral TID w/meals     haloperidol  1 mg Oral At Bedtime     levothyroxine  88 mcg Oral Daily     megestrol  20 mg Oral Daily     memantine  10 mg Oral BID     [Held by provider] metoprolol tartrate  25 mg Oral BID     mirtazapine  15 mg Oral At Bedtime     salmeterol  1 puff Inhalation BID          Allergies:     Allergies   Allergen Reactions     Ibuprofen      Latex           Labs:     No results found for this or any previous visit (from the past 24 hour(s)).       Psychiatric Examination:     /89 (BP Location: Right arm, Patient Position: Supine)   Pulse 102   Temp 98.5  F (36.9  C) (Oral)   Resp 12   Ht 1.829 m (6')   Wt 75.4 kg (166 lb 4.8 oz)   SpO2 100%   BMI 22.55 kg/m    Weight is 166 lbs 4.8 oz  Body mass index is 22.55 kg/m .  Orthostatic Vitals  Report      Most Recent      Lying Orthostatic BP 75/58 11/12 0850    Lying Orthostatic Pulse (bpm) 84 11/12 0850    " "        Appearance: awake, alert  Attitude:  somewhat cooperative  Eye Contact:  good  Mood:  \"fine\"  Affect:  intensity is flat  Speech:  mumbling but more clear  Psychomotor Behavior:  no evidence of tardive dyskinesia, dystonia, or tics  Thought Process:  linear  Associations:  no loose associations  Thought Content:  no evidence of suicidal ideation or homicidal ideation and obsessions present.   Insight:  limited  Judgement:  limited  Oriented to:  person and place  Attention Span and Concentration:  fair  Recent and Remote Memory:  poor    Clinical Global Impressions  First:  Considering your total clinical experience with this particular patient population, how severe are the patient's symptoms at this time?: 7 (07/01/21 0630)  Compared to the patient's condition at the START of treatment, this patient's condition is: 4 (07/01/21 0630)  Most recent:  Considering your total clinical experience with this particular patient population, how severe are the patient's symptoms at this time?: 7 (11/09/21 1642)  Compared to the patient's condition at the START of treatment, this patient's condition is: 5 (11/09/21 1642)         Precautions:     Behavioral Orders   Procedures     Assault precautions     Cheeking Precautions (behavioral units)     Patient Observed swallowing PO medications; Patient asked to drink water after swallowing medication; Patient in Staff line of sight for 15 minutes after medication given; Mouth checks after PO administration (patient asked to open mouth and stick out their tongue).     Code 1     Code 2     With Security for any imaging/testing needed.     Elopement precautions     Fall precautions     Routine Programming     As clinically indicated     Single Room     Status 15     Every 15 minutes.          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   Stimulant use disorder, in remission.   Transaminitis, possibly medication induced   Hx of CVA  Vit D deficiency  Hypertension  COPD  Hx of infective endocarditis with severe mitral and aortic regurgitation s/p biprostehtic valve replacement 2015  Hx of HFrEF now recovered  Tobacco use disorder  Severe malnutrition in context of acute illness   Urinary incontinence  Constipation   Malaise  Prolonged QTc         Plan:     Assessment and hospital summary:  The patient is a 54yo male with a history of schizophrenia and TBI and multiple medical co-morbidities as above who was admitted after being transferred from [facility] after a nearly year-long stay. Is currently under commitment with Yanez recently amended to include Clozaril. While at [facility] was noted to have ongoing agitation and frequently attempting to elope from unit requiring 1:1 staffing for safety. He was started on 1:1 staff upon transfer, this was discontinued as patient has been more cooperative without elopement attempts. IM consult completed on admission and continued to follow due to elevated LFTs. Haldol and VPA discontinued due to LFTs. Cross titration completed from risperidone to clozaril after Yanez amended. EPSE/TD movements noted, have appeared stable over past few weeks. Patient has continued to have disorganization and active psychosis symptoms which appear to be at patients baseline. Team continues to work on disposition which barriers include patient has no guardian or next of kin willing to act as surrogate decision maker, see CTC notes for complete details. Patient starting to have increasing symptoms noted week of 9/20, clozapine level ordered and noted to be lower than previous, have added cheeking precautions and increased clozapine dose on 9/24 and moved to split dosing given increasing afternoon agitation. Pt having decreased appetite, increased fatigue over weekend of  10/9-10/10, IM consult placed 10/11, KUB Xray showed large stool burden, bowel regimen modified. Pt was incontinent of stool evening of 10/13. Consolidated clozapine dose to bedtime given concern for daytime sedation with split dosing. Ongoing malaise noted starting 10/11, internal medicine has been contacted periodically. Noted 18# weight loss in 2 months, IM contacted for possible FTT on 11/4. Ethics consult placed 11/5. Neurology re-consulted 11/8.     12/2 - reached out to CTC lead for update on guardianship process.      Psychiatric treatment/inteventions:  Medications:   -continue clozapine 200mg at bedtime  -continue Haldol 1mg Qday  -continue gabapentin 100mg TID  -continue Remeron 15mg at bedtime.   -continue cheeking precautions, started 9/27 given noted decompensation in recent weeks and lower clozapine level   -continue memantine at 5mg daily for neurocognitive disorder, dose was up to 10mg BID and have been reducing dose, may taper off as patient does not appear to have benefit and reporting oversedation and to reduce anticholinergic load  -continue risperdal 1mg Q6H PRN agitation   -continue lorazepam 0.5-1.0mg Q4H PRN anxiety or severe agitation     -discontinued benztropine 0.25mg BID on 10/21, tapered off to reduce anticholinergic load  -discontinued rivastigmine patch on 10/21,pt did not seemed to have benefit and to reduce anticholinergic load     -Ethics consult placed 11/5 due to patient lacking decision making capacity without guardian or surrogate decision maker and possible need for medical interventions including NJ/NG or PEG tube in future, appreciate assistance, care conference on 11/11    Discussed plan for NG tube with medicine and they are following this closely.   Continue Megace    Consulted podiatry for patient's overgrown fingernails. They are looking at options for trimming these.     Disposition Plan   Reason for ongoing admission: is unable to care for self due to severe  psychosis or mariusz  Discharge location: group home  Discharge Medications: not ordered  Follow-up Appointments: not scheduled  Legal Status: full commitment    Entered by: Wilton Morfin on December 7, 2021 at 2:00 PM

## 2021-12-08 PROCEDURE — 250N000013 HC RX MED GY IP 250 OP 250 PS 637: Performed by: PSYCHIATRY & NEUROLOGY

## 2021-12-08 PROCEDURE — 99233 SBSQ HOSP IP/OBS HIGH 50: CPT | Performed by: PSYCHIATRY & NEUROLOGY

## 2021-12-08 PROCEDURE — 124N000003 HC R&B MH SENIOR/ADOLESCENT

## 2021-12-08 RX ORDER — POLYETHYLENE GLYCOL 3350 17 G
2 POWDER IN PACKET (EA) ORAL
Status: DISCONTINUED | OUTPATIENT
Start: 2021-12-08 | End: 2021-12-08

## 2021-12-08 RX ADMIN — ATORVASTATIN CALCIUM 80 MG: 80 TABLET, FILM COATED ORAL at 21:08

## 2021-12-08 RX ADMIN — ASPIRIN 81 MG CHEWABLE TABLET 81 MG: 81 TABLET CHEWABLE at 08:23

## 2021-12-08 RX ADMIN — BUSPIRONE HYDROCHLORIDE 10 MG: 10 TABLET ORAL at 21:09

## 2021-12-08 RX ADMIN — LEVOTHYROXINE SODIUM 88 MCG: 88 TABLET ORAL at 08:23

## 2021-12-08 RX ADMIN — GABAPENTIN 100 MG: 100 CAPSULE ORAL at 17:43

## 2021-12-08 RX ADMIN — CLOZAPINE 200 MG: 100 TABLET ORAL at 21:09

## 2021-12-08 RX ADMIN — MEMANTINE 10 MG: 10 TABLET ORAL at 21:08

## 2021-12-08 RX ADMIN — MEGESTROL ACETATE 20 MG: 20 TABLET ORAL at 08:23

## 2021-12-08 RX ADMIN — HALOPERIDOL 1 MG: 1 TABLET ORAL at 21:08

## 2021-12-08 RX ADMIN — BUSPIRONE HYDROCHLORIDE 10 MG: 10 TABLET ORAL at 08:23

## 2021-12-08 RX ADMIN — MIRTAZAPINE 15 MG: 15 TABLET, FILM COATED ORAL at 21:08

## 2021-12-08 RX ADMIN — MEMANTINE 10 MG: 10 TABLET ORAL at 08:23

## 2021-12-08 RX ADMIN — SALMETEROL XINAFOATE 1 PUFF: 50 POWDER, METERED ORAL; RESPIRATORY (INHALATION) at 08:23

## 2021-12-08 RX ADMIN — GABAPENTIN 100 MG: 100 CAPSULE ORAL at 08:23

## 2021-12-08 RX ADMIN — BUSPIRONE HYDROCHLORIDE 10 MG: 10 TABLET ORAL at 13:10

## 2021-12-08 RX ADMIN — GABAPENTIN 100 MG: 100 CAPSULE ORAL at 13:10

## 2021-12-08 RX ADMIN — SALMETEROL XINAFOATE 1 PUFF: 50 POWDER, METERED ORAL; RESPIRATORY (INHALATION) at 21:08

## 2021-12-08 ASSESSMENT — ACTIVITIES OF DAILY LIVING (ADL)
DRESS: SCRUBS (BEHAVIORAL HEALTH);INDEPENDENT
HYGIENE/GROOMING: PROMPTS
LAUNDRY: UNABLE TO COMPLETE
ORAL_HYGIENE: PROMPTS
LAUNDRY: UNABLE TO COMPLETE
ORAL_HYGIENE: PROMPTS
DRESS: SCRUBS (BEHAVIORAL HEALTH)
HYGIENE/GROOMING: PROMPTS

## 2021-12-08 NOTE — PLAN OF CARE
"Patient out of room for approx 30 minutes for supper and watched tv for a while then back to his room.  Appetite remains poor.  Refuses help with ADL's.  Very poor hygiene.  When asked how he was doing today patient replied \"I'm doing OK\".  Denies any mental health symptoms.  Med compliant.  P:  Continue same plan of care  "

## 2021-12-08 NOTE — PLAN OF CARE
Problem: Sleep Disturbance  Goal: Adequate Sleep/Rest  Outcome: No Change      Patient slept a total of 9.25 hours. Refused to get up when asked to go to the bathroom. Intake-0 Output-0.

## 2021-12-08 NOTE — PROGRESS NOTES
"[facility], [place]   Psychiatric Progress Note        Interim History:   The patient's care was discussed with the treatment team during the daily team meeting and/or staff's chart notes were reviewed.  Staff report patient is \"status quo.\" Was endorsing AH yesterday. Not attending to ADLs.     The patient states that he is \"terrible.\" When asked why, he says, \"Because I can't get out of here.\" Sleeping well. Not eating that well. Denies SI. Discussed nicotine lozenge if he is having more cravings for cigarettes.          Medications:       aspirin  81 mg Oral Daily     atorvastatin  80 mg Oral At Bedtime     busPIRone  10 mg Oral TID     [Held by provider] cholecalciferol  1,250 mcg Oral Q7 Days     cloZAPine  200 mg Oral At Bedtime     gabapentin  100 mg Oral TID w/meals     haloperidol  1 mg Oral At Bedtime     levothyroxine  88 mcg Oral Daily     megestrol  20 mg Oral Daily     memantine  10 mg Oral BID     [Held by provider] metoprolol tartrate  25 mg Oral BID     mirtazapine  15 mg Oral At Bedtime     salmeterol  1 puff Inhalation BID          Allergies:     Allergies   Allergen Reactions     Ibuprofen      Latex           Labs:     No results found for this or any previous visit (from the past 24 hour(s)).       Psychiatric Examination:     /81 (BP Location: Right arm)   Pulse 99   Temp 97  F (36.1  C) (Oral)   Resp 16   Ht 1.829 m (6')   Wt 75.4 kg (166 lb 4.8 oz)   SpO2 96%   BMI 22.55 kg/m    Weight is 166 lbs 4.8 oz  Body mass index is 22.55 kg/m .  Orthostatic Vitals  Report      Most Recent      Lying Orthostatic BP 75/58 11/12 0850    Lying Orthostatic Pulse (bpm) 84 11/12 0850            Appearance: awake, alert  Attitude:  somewhat cooperative  Eye Contact:  good  Mood:  \"terrible\"  Affect:  intensity is flat  Speech:  mumbling but more clear  Psychomotor Behavior:  no evidence of tardive dyskinesia, dystonia, or tics  Thought Process:  linear  Associations: "  no loose associations  Thought Content:  no evidence of suicidal ideation or homicidal ideation and auditory hallucinations present.   Insight:  limited  Judgement:  limited  Oriented to:  person and place  Attention Span and Concentration:  fair  Recent and Remote Memory:  poor    Clinical Global Impressions  First:  Considering your total clinical experience with this particular patient population, how severe are the patient's symptoms at this time?: 7 (07/01/21 0630)  Compared to the patient's condition at the START of treatment, this patient's condition is: 4 (07/01/21 0630)  Most recent:  Considering your total clinical experience with this particular patient population, how severe are the patient's symptoms at this time?: 7 (11/09/21 1642)  Compared to the patient's condition at the START of treatment, this patient's condition is: 5 (11/09/21 1642)         Precautions:     Behavioral Orders   Procedures     Assault precautions     Cheeking Precautions (behavioral units)     Patient Observed swallowing PO medications; Patient asked to drink water after swallowing medication; Patient in Staff line of sight for 15 minutes after medication given; Mouth checks after PO administration (patient asked to open mouth and stick out their tongue).     Code 1     Code 2     With Security for any imaging/testing needed.     Elopement precautions     Fall precautions     Routine Programming     As clinically indicated     Single Room     Status 15     Every 15 minutes.          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   Stimulant use disorder, in remission.   Transaminitis, possibly medication induced   Hx of CVA  Vit D deficiency  Hypertension  COPD  Hx of infective endocarditis with severe mitral and aortic regurgitation s/p biprostehtic  valve replacement 2015  Hx of HFrEF now recovered  Tobacco use disorder  Severe malnutrition in context of acute illness   Urinary incontinence  Constipation   Malaise  Prolonged QTc         Plan:     Assessment and hospital summary:  The patient is a 56yo male with a history of schizophrenia and TBI and multiple medical co-morbidities as above who was admitted after being transferred from [facility] after a nearly year-long stay. Is currently under commitment with Yanez recently amended to include Clozaril. While at [facility] was noted to have ongoing agitation and frequently attempting to elope from unit requiring 1:1 staffing for safety. He was started on 1:1 staff upon transfer, this was discontinued as patient has been more cooperative without elopement attempts. IM consult completed on admission and continued to follow due to elevated LFTs. Haldol and VPA discontinued due to LFTs. Cross titration completed from risperidone to clozaril after Yanez amended. EPSE/TD movements noted, have appeared stable over past few weeks. Patient has continued to have disorganization and active psychosis symptoms which appear to be at patients baseline. Team continues to work on disposition which barriers include patient has no guardian or next of kin willing to act as surrogate decision maker, see CTC notes for complete details. Patient starting to have increasing symptoms noted week of 9/20, clozapine level ordered and noted to be lower than previous, have added cheeking precautions and increased clozapine dose on 9/24 and moved to split dosing given increasing afternoon agitation. Pt having decreased appetite, increased fatigue over weekend of 10/9-10/10, IM consult placed 10/11, KUB Xray showed large stool burden, bowel regimen modified. Pt was incontinent of stool evening of 10/13. Consolidated clozapine dose to bedtime given concern for daytime sedation with split dosing. Ongoing malaise noted starting 10/11,  internal medicine has been contacted periodically. Noted 18# weight loss in 2 months, IM contacted for possible FTT on 11/4. Ethics consult placed 11/5. Neurology re-consulted 11/8.     12/2 - reached out to CTC lead for update on guardianship process.      Psychiatric treatment/inteventions:  Medications:   -continue clozapine 200mg at bedtime  -continue Haldol 1mg Qday  -continue gabapentin 100mg TID  -continue Remeron 15mg at bedtime.   -continue cheeking precautions, started 9/27 given noted decompensation in recent weeks and lower clozapine level   -continue memantine at 5mg daily for neurocognitive disorder, dose was up to 10mg BID and have been reducing dose, may taper off as patient does not appear to have benefit and reporting oversedation and to reduce anticholinergic load  -continue risperdal 1mg Q6H PRN agitation   -continue lorazepam 0.5-1.0mg Q4H PRN anxiety or severe agitation     -discontinued benztropine 0.25mg BID on 10/21, tapered off to reduce anticholinergic load  -discontinued rivastigmine patch on 10/21,pt did not seemed to have benefit and to reduce anticholinergic load     -Ethics consult placed 11/5 due to patient lacking decision making capacity without guardian or surrogate decision maker and possible need for medical interventions including NJ/NG or PEG tube in future, appreciate assistance, care conference on 11/11    Discussed plan for NG tube with medicine and they are following this closely.   Continue Megace    Consulted podiatry for patient's overgrown fingernails. They are looking at options for trimming these.     Encouraged patient to try nicotine lozenge if he is having cravings.     Disposition Plan   Reason for ongoing admission: is unable to care for self due to severe psychosis or mariusz  Discharge location: group home  Discharge Medications: not ordered  Follow-up Appointments: not scheduled  Legal Status: full commitment    Entered by: Wilton Morfin on December 8,  2021 at 2:12 PM

## 2021-12-08 NOTE — PLAN OF CARE
Problem: Psychotic Symptoms  Goal: Psychotic Symptoms  Description: Signs and symptoms of listed problems will be absent or manageable.  Outcome: No Change  Goal: Social and Therapeutic (Psychotic Symptoms)  Description: Signs and symptoms of listed problems will be absent or manageable.  Outcome: No Change     Problem: Sleep Disturbance  Goal: Adequate Sleep/Rest  Outcome: No Change     Problem: Cognitive Impairment (Psychotic Signs/Symptoms)  Goal: Optimal Cognitive Function (Psychotic Signs/Symptoms)  Outcome: No Change  Intervention: Support and Promote Cognitive Ability  Recent Flowsheet Documentation  Taken 12/8/2021 1000 by Juarez Murphy RN  Trust Relationship/Rapport:    care explained    choices provided    emotional support provided    empathic listening provided    questions answered    questions encouraged    reassurance provided    thoughts/feelings acknowledged     Problem: Malnutrition  Goal: Improved Nutritional Intake  Outcome: Improving     Patient denies SI,SIB, denies mental health illness. Poor insight and judgement. Medication compliant. Ate 50% of breakfast and 25% of lunch, came to the milieu for lunch. Did not attend groups, isolative and withdrawn. Intake of 560 ml. Voided in toilet, urine franko colored and unmeasured. Patient not voiding into hat consistently. Patient reports wanting to smoke a cigarette, writer offered nicotine lozenge and patient declined. Patient encouraged to shower today, got as far as the hallway and turned around declining to enter the shower room.

## 2021-12-09 LAB
ANION GAP SERPL CALCULATED.3IONS-SCNC: 6 MMOL/L (ref 3–14)
BASOPHILS # BLD AUTO: 0.1 10E3/UL (ref 0–0.2)
BASOPHILS NFR BLD AUTO: 2 %
BUN SERPL-MCNC: 12 MG/DL (ref 7–30)
CALCIUM SERPL-MCNC: 8.8 MG/DL (ref 8.5–10.1)
CHLORIDE BLD-SCNC: 113 MMOL/L (ref 94–109)
CO2 SERPL-SCNC: 22 MMOL/L (ref 20–32)
CREAT SERPL-MCNC: 0.72 MG/DL (ref 0.66–1.25)
EOSINOPHIL # BLD AUTO: 1.5 10E3/UL (ref 0–0.7)
EOSINOPHIL NFR BLD AUTO: 21 %
ERYTHROCYTE [DISTWIDTH] IN BLOOD BY AUTOMATED COUNT: 14.9 % (ref 10–15)
GFR SERPL CREATININE-BSD FRML MDRD: >90 ML/MIN/1.73M2
GLUCOSE BLD-MCNC: 93 MG/DL (ref 70–99)
HCT VFR BLD AUTO: 41.3 % (ref 40–53)
HGB BLD-MCNC: 14.1 G/DL (ref 13.3–17.7)
IMM GRANULOCYTES # BLD: 0 10E3/UL
IMM GRANULOCYTES NFR BLD: 0 %
LYMPHOCYTES # BLD AUTO: 1.6 10E3/UL (ref 0.8–5.3)
LYMPHOCYTES NFR BLD AUTO: 22 %
MAGNESIUM SERPL-MCNC: 2.4 MG/DL (ref 1.6–2.3)
MCH RBC QN AUTO: 30.4 PG (ref 26.5–33)
MCHC RBC AUTO-ENTMCNC: 34.1 G/DL (ref 31.5–36.5)
MCV RBC AUTO: 89 FL (ref 78–100)
MONOCYTES # BLD AUTO: 0.6 10E3/UL (ref 0–1.3)
MONOCYTES NFR BLD AUTO: 8 %
NEUTROPHILS # BLD AUTO: 3.5 10E3/UL (ref 1.6–8.3)
NEUTROPHILS NFR BLD AUTO: 47 %
NRBC # BLD AUTO: 0 10E3/UL
NRBC BLD AUTO-RTO: 0 /100
PHOSPHATE SERPL-MCNC: 3.3 MG/DL (ref 2.5–4.5)
PLATELET # BLD AUTO: 251 10E3/UL (ref 150–450)
POTASSIUM BLD-SCNC: 3.8 MMOL/L (ref 3.4–5.3)
RBC # BLD AUTO: 4.64 10E6/UL (ref 4.4–5.9)
SODIUM SERPL-SCNC: 141 MMOL/L (ref 133–144)
WBC # BLD AUTO: 7.3 10E3/UL (ref 4–11)

## 2021-12-09 PROCEDURE — 80048 BASIC METABOLIC PNL TOTAL CA: CPT | Performed by: PHYSICIAN ASSISTANT

## 2021-12-09 PROCEDURE — 85025 COMPLETE CBC W/AUTO DIFF WBC: CPT | Performed by: PSYCHIATRY & NEUROLOGY

## 2021-12-09 PROCEDURE — 83735 ASSAY OF MAGNESIUM: CPT | Performed by: PHYSICIAN ASSISTANT

## 2021-12-09 PROCEDURE — 250N000013 HC RX MED GY IP 250 OP 250 PS 637: Performed by: PSYCHIATRY & NEUROLOGY

## 2021-12-09 PROCEDURE — 84100 ASSAY OF PHOSPHORUS: CPT | Performed by: PHYSICIAN ASSISTANT

## 2021-12-09 PROCEDURE — 124N000003 HC R&B MH SENIOR/ADOLESCENT

## 2021-12-09 PROCEDURE — 36415 COLL VENOUS BLD VENIPUNCTURE: CPT | Performed by: PHYSICIAN ASSISTANT

## 2021-12-09 RX ADMIN — MEMANTINE 10 MG: 10 TABLET ORAL at 08:21

## 2021-12-09 RX ADMIN — BUSPIRONE HYDROCHLORIDE 10 MG: 10 TABLET ORAL at 13:24

## 2021-12-09 RX ADMIN — SALMETEROL XINAFOATE 1 PUFF: 50 POWDER, METERED ORAL; RESPIRATORY (INHALATION) at 20:43

## 2021-12-09 RX ADMIN — BUSPIRONE HYDROCHLORIDE 10 MG: 10 TABLET ORAL at 08:22

## 2021-12-09 RX ADMIN — GABAPENTIN 100 MG: 100 CAPSULE ORAL at 08:21

## 2021-12-09 RX ADMIN — MIRTAZAPINE 15 MG: 15 TABLET, FILM COATED ORAL at 20:43

## 2021-12-09 RX ADMIN — GABAPENTIN 100 MG: 100 CAPSULE ORAL at 13:24

## 2021-12-09 RX ADMIN — ASPIRIN 81 MG CHEWABLE TABLET 81 MG: 81 TABLET CHEWABLE at 08:21

## 2021-12-09 RX ADMIN — CLOZAPINE 200 MG: 100 TABLET ORAL at 20:43

## 2021-12-09 RX ADMIN — GABAPENTIN 100 MG: 100 CAPSULE ORAL at 17:16

## 2021-12-09 RX ADMIN — MEGESTROL ACETATE 20 MG: 20 TABLET ORAL at 08:22

## 2021-12-09 RX ADMIN — BUSPIRONE HYDROCHLORIDE 10 MG: 10 TABLET ORAL at 20:43

## 2021-12-09 RX ADMIN — SALMETEROL XINAFOATE 1 PUFF: 50 POWDER, METERED ORAL; RESPIRATORY (INHALATION) at 08:22

## 2021-12-09 RX ADMIN — HALOPERIDOL 1 MG: 1 TABLET ORAL at 20:43

## 2021-12-09 RX ADMIN — ATORVASTATIN CALCIUM 80 MG: 80 TABLET, FILM COATED ORAL at 20:43

## 2021-12-09 RX ADMIN — LEVOTHYROXINE SODIUM 88 MCG: 88 TABLET ORAL at 08:21

## 2021-12-09 RX ADMIN — MEMANTINE 10 MG: 10 TABLET ORAL at 20:43

## 2021-12-09 ASSESSMENT — ACTIVITIES OF DAILY LIVING (ADL)
LAUNDRY: UNABLE TO COMPLETE
ORAL_HYGIENE: PROMPTS
DRESS: PROMPTS
DRESS: INDEPENDENT
HYGIENE/GROOMING: PROMPTS
HYGIENE/GROOMING: PROMPTS
ORAL_HYGIENE: PROMPTS
LAUNDRY: UNABLE TO COMPLETE

## 2021-12-09 NOTE — PLAN OF CARE
"  Problem: Psychotic Symptoms  Goal: Psychotic Symptoms  Description: Signs and symptoms of listed problems will be absent or manageable.  Outcome: No Change     Problem: Psychotic Symptoms  Goal: Social and Therapeutic (Psychotic Symptoms)  Description: Signs and symptoms of listed problems will be absent or manageable.  Outcome: No Change    Patient remains isolative and withdrawn. He does not interact with peers. He responds to verbal stimulation made by staff and this writer but his speech is rambling and mumbles most of the time which can hardly be understood. He denies SI/SIB. No hallucinations reported. His insight is quite limited. He verbalized wanting to get out from here but did not demonstrate loitering around exit doors.  He has disorganized thoughts. His mood is irritable with a flat and blunt affect. His appetite is poor. He only ate very little like nibbling of everything in the tray. He is med compliant. No cheeking of meds observed. He is only oriented to person and place. He went to the toilet x 2 as claimed and had \"No. 1\". Pt. refused to attend groups this evening.     "

## 2021-12-09 NOTE — PLAN OF CARE
Assessment/Intervention/Current Symptoms and Care Coordination:    Current Symptoms include the following: Confusion, delusional thinking, irritability, oriented to only self, and to other.    Chart Review    Met with this patient today.         Discharge Plan or Goal:  Pending stabilization & development of a safe discharge plan.  Considerations include: Nursing home. Guardianship process is being pursued.     Barriers to Discharge:  Patient requires further psychiatric stabilization due to current symptomology, Medication management with possible adjustments and guardianship     Referral Status:  No new referrals made.     Legal Status:  Patient is under MI commitment in [facility] with Yanez. [id number]        Contacts:  Case Management Services:  [facility]   Kurtis Chapin. Phone: [phone]  Supervisor is [email or web address]     Freida Machuca  Sister  [phone]

## 2021-12-09 NOTE — PLAN OF CARE
Assessment/Intervention/Current Symptoms and Care Coordination:    Current Symptoms include the following: Confusion, delusional thinking, irritability, oriented to only self, and to other.    Chart Review    Met with this patient today.    Left an update message for pt's , Aneesh Chapin. Requested him to call back to be updated on pt's status.        Discharge Plan or Goal:  Pending stabilization & development of a safe discharge plan.  Considerations include: Nursing home. Guardianship process is being pursued.     Barriers to Discharge:  Patient requires further psychiatric stabilization due to current symptomology, Medication management with possible adjustments and guardianship     Referral Status:  No new referrals made.     Legal Status:  Patient is under MI commitment in [facility] with Yanez. [id number]        Contacts:  Case Management Services:  [facility]   Kurtis Chapin. Phone: [phone]  Supervisor is [email or web address]     Freida Tlaley  [phone]

## 2021-12-09 NOTE — PLAN OF CARE
Problem: Sleep Disturbance  Goal: Adequate Sleep/Rest  Outcome: No Change     Patient slept a total of 12 hours. Voided once - 100 ml dark franko colored urine. Intake-0. No complaints presented the whole night.

## 2021-12-09 NOTE — PLAN OF CARE
"  Problem: Psychotic Symptoms  Goal: Psychotic Symptoms  Description: Signs and symptoms of listed problems will be absent or manageable.  Outcome: No Change     Problem: Cognitive Impairment (Psychotic Signs/Symptoms)  Goal: Optimal Cognitive Function (Psychotic Signs/Symptoms)  Outcome: No Change  Intervention: Support and Promote Cognitive Ability  Recent Flowsheet Documentation  Taken 12/9/2021 0900 by Juarez Murphy RN  Trust Relationship/Rapport:    care explained    choices provided    emotional support provided    questions answered    empathic listening provided    questions encouraged    reassurance provided    thoughts/feelings acknowledged     Problem: Behavioral Disturbance  Goal: Behavioral Disturbance  Description: Signs and symptoms of listed problems will be absent or manageable by discharge or transition of care.  Outcome: No Change     Problem: Malnutrition  Goal: Improved Nutritional Intake  Outcome: No Change     Patient isolative and withdrawn, mood irritable and labile. Found to be incontinent this morning requiring a bed linen change. Patient is hypoactive needing encouragement to come out for meals. During breakfast patient begin to raise his voice at staff when encouraged to stay in milieu and wait for medications. Patient yelled \"get out of my way!\" and went back to his room. Patient came out for lunch ate about 25% and went back to room without incident. Denies mental health illness, SI, SIB.  "

## 2021-12-10 PROCEDURE — 124N000003 HC R&B MH SENIOR/ADOLESCENT

## 2021-12-10 PROCEDURE — 250N000013 HC RX MED GY IP 250 OP 250 PS 637: Performed by: PSYCHIATRY & NEUROLOGY

## 2021-12-10 RX ORDER — BUSPIRONE HYDROCHLORIDE 15 MG/1
15 TABLET ORAL 3 TIMES DAILY
Status: DISCONTINUED | OUTPATIENT
Start: 2021-12-10 | End: 2021-12-13

## 2021-12-10 RX ADMIN — BUSPIRONE HYDROCHLORIDE 10 MG: 10 TABLET ORAL at 14:16

## 2021-12-10 RX ADMIN — GABAPENTIN 100 MG: 100 CAPSULE ORAL at 09:18

## 2021-12-10 RX ADMIN — SALMETEROL XINAFOATE 1 PUFF: 50 POWDER, METERED ORAL; RESPIRATORY (INHALATION) at 09:19

## 2021-12-10 RX ADMIN — CLOZAPINE 200 MG: 100 TABLET ORAL at 20:07

## 2021-12-10 RX ADMIN — ASPIRIN 81 MG CHEWABLE TABLET 81 MG: 81 TABLET CHEWABLE at 09:18

## 2021-12-10 RX ADMIN — MEMANTINE 10 MG: 10 TABLET ORAL at 20:07

## 2021-12-10 RX ADMIN — MIRTAZAPINE 15 MG: 15 TABLET, FILM COATED ORAL at 20:07

## 2021-12-10 RX ADMIN — LEVOTHYROXINE SODIUM 88 MCG: 88 TABLET ORAL at 09:18

## 2021-12-10 RX ADMIN — SALMETEROL XINAFOATE 1 PUFF: 50 POWDER, METERED ORAL; RESPIRATORY (INHALATION) at 20:08

## 2021-12-10 RX ADMIN — BUSPIRONE HYDROCHLORIDE 15 MG: 15 TABLET ORAL at 20:10

## 2021-12-10 RX ADMIN — GABAPENTIN 100 MG: 100 CAPSULE ORAL at 17:52

## 2021-12-10 RX ADMIN — BUSPIRONE HYDROCHLORIDE 10 MG: 10 TABLET ORAL at 09:18

## 2021-12-10 RX ADMIN — ATORVASTATIN CALCIUM 80 MG: 80 TABLET, FILM COATED ORAL at 20:06

## 2021-12-10 RX ADMIN — MEGESTROL ACETATE 20 MG: 20 TABLET ORAL at 09:19

## 2021-12-10 RX ADMIN — GABAPENTIN 100 MG: 100 CAPSULE ORAL at 12:58

## 2021-12-10 RX ADMIN — MEMANTINE 10 MG: 10 TABLET ORAL at 09:19

## 2021-12-10 RX ADMIN — HALOPERIDOL 1 MG: 1 TABLET ORAL at 20:06

## 2021-12-10 ASSESSMENT — ACTIVITIES OF DAILY LIVING (ADL)
ORAL_HYGIENE: PROMPTS
HYGIENE/GROOMING: PROMPTS
HYGIENE/GROOMING: PROMPTS
ORAL_HYGIENE: PROMPTS
DRESS: PROMPTS
DRESS: PROMPTS;SCRUBS (BEHAVIORAL HEALTH)

## 2021-12-10 NOTE — PLAN OF CARE
Patient denies any mental health symptoms.  Thought the month was august and that his court commitment had .  Continues to ask about where he can go to smoke.  Refused to come out of room tonight.  Taking in adequate fluids but doesn't do well with solids.  Voids in the toilet.  No new issues.  P:  continue same plan of care.

## 2021-12-10 NOTE — PLAN OF CARE
"  Problem: Psychotic Symptoms  Goal: Social and Therapeutic (Psychotic Symptoms)  Description: Signs and symptoms of listed problems will be absent or manageable.  Recent Flowsheet Documentation  Taken 12/10/2021 1153 by Una Gaytan RN  Psychotic Symptoms Assessed: all  Psychotic Symptoms Present:    affect    insight    mood    speech    memory deficits    thought process    Pt has been isolative to his room and has declined to come out to common area's. Pt did sit up on the edge of his bed and ate approximately 50% of his breakfast (50% Turkish toast, 75% pork sausage, 75% of a banana, 240 ml of 1% milk and an additional 200 ml's of 1% milk.   Pt was offered and accepted 60 ml of supplement late morning.  Pt is oriented only to himself. Pt asked writer if he was at \"[facility] \" this AM. Pt was reminded that he was at the [facility]. Pt then asked if it was in [place]. Pt stated \"When can I go home\". Pt's affect is flat. Pt denies thoughts of wanting to harm himself.   Pt continues to be confused and has poor insight. Pt has asked several times today for a cigarette. Pt was reminded that he cannot smoke while he is in the hospital. This PM patient reported that the doctor told him he could leave and that writer should pack up his belongings. Pt ate poorly at lunch (few bites). Pt has taken 1 1/2 of vanilla supplement this shift. Pt has been medication compliant.   "

## 2021-12-10 NOTE — PLAN OF CARE
BEHAVIORAL TEAM DISCUSSION     Participants: Wilton Morfin MD; Lukas Stearns Marijo C, RN; Joi MALDONADO OT; COLETTE [facility];      Progress: Pt was transferred from [facility]. Assessment is ongoing to determine adjustments and implementations necessary for continued wellness. Noted for decline, physically.  Pt is getting out of bed, occasionally for meals.  Medical work- up has not yielded an origin of his malaise.     Anticipated length of stay:    unknown - may be transferred to medicine.     Continued Stay Criteria/Rationale:   The pt is not able to care for himself. Pt is still experiencing psychosis, talking to himself.        Medical/Physical: See medical for details  Needs 2 staff to transfe     Precautions:           Behavioral Orders   Procedures     Assault precautions     Cheeking Precautions (behavioral units)       Patient Observed swallowing PO medications; Patient asked to drink water after swallowing medication; Patient in Staff line of sight for 15 minutes after medication given; Mouth checks after PO administration (patient asked to open mouth and stick out their tongue).     Code 1     Code 2       With Security for any imaging/testing needed.     Elopement precautions     Fall precautions     Routine Programming       As clinically indicated     Single Room     Status 15       Every 15 minutes.      Plan:   Help pt eat  Medication Management  Ethics consultations as needed  Medications:           aspirin  81 mg Oral Daily     atorvastatin  80 mg Oral At Bedtime     busPIRone  10 mg Oral TID     [Held by provider] cholecalciferol  1,250 mcg Oral Q7 Days     cloZAPine  200 mg Oral At Bedtime     gabapentin  100 mg Oral TID w/meals     haloperidol  1 mg Oral At Bedtime     levothyroxine  88 mcg Oral Daily     megestrol  20 mg Oral Daily     memantine  10 mg Oral BID     [Held by provider] metoprolol tartrate  25 mg Oral BID     mirtazapine  15 mg Oral At Bedtime     salmeterol  1 puff Inhalation  BID         Pt will be encouraged to engaged in the therapeutic milieu  Pt will be encouraged to attend groups to learn and practice positive skills to cope with his symptoms  Medical consultations will be implemented as needed  Care coordination will be implemented.

## 2021-12-10 NOTE — PLAN OF CARE
Problem: Psychotic Symptoms  Goal: Psychotic Symptoms  Description: Signs and symptoms of listed problems will be absent or manageable.  Outcome: No Change     Problem: Psychotic Symptoms  Goal: Social and Therapeutic (Psychotic Symptoms)  Description: Signs and symptoms of listed problems will be absent or manageable.  Outcome: No Change    Patient is isolative and withdrawn. He most of the shift laying in his bed; napping. He is not interacting with peers. His responses were very minimal during the 1:1 check-in. He denied SI/SIB nor hallucinations. Just the same, he said that he wants to get out from the hospital. His thoughts are disorganized. No side effects of medications reported. His insights are quite limited. He is med compliant. His appetite is poor. He only ate less than 25% but drank his nutritional supplement. Refused to go to groups. He went to the bathroom and voided x 2 with dark yellow, concentrated urine. His BP lying 84/62; P-105; Sitting 91/64; P-113. Fluids pushed.

## 2021-12-10 NOTE — PLAN OF CARE
Assessment/Intervention/Current Symptoms and Care Coordination:    Current Symptoms include the following: Confusion, delusional thinking, irritability, oriented to only self, and to other.    Chart Review    Met with this patient today.    Left an update message for pt's , Aneesh Chapin. Awaiting a call-back from him. We are still waiting to hear from the 's office regarding guardianship arrangement. Calls made to potential source of cultural guardianship have not been returned. At this point we are hoping the arrangement with the [facility] will work.        Discharge Plan or Goal:  Pending stabilization & development of a safe discharge plan.  Considerations include: Nursing home. Guardianship process is being pursued.     Barriers to Discharge:  Patient requires further psychiatric stabilization due to current symptomology, Medication management with possible adjustments and guardianship     Referral Status:  No new referrals made.     Legal Status:  Patient is under MI commitment in [facility] with Yanez. [id number]        Contacts:  Case Management Services:  [facility]   Kurtis Chapin. Phone: [phone]  Supervisor is [email or web address]     Freida Machuca  Sister  [phone]

## 2021-12-10 NOTE — PLAN OF CARE
Problem: Sleep Disturbance  Goal: Adequate Sleep/Rest  Outcome: No Change      Patient slept a total of 11.5 hours. Intake-0. Output-100 ml franko colored urine. No concerns raised the whole shift.

## 2021-12-11 PROCEDURE — 250N000013 HC RX MED GY IP 250 OP 250 PS 637: Performed by: PSYCHIATRY & NEUROLOGY

## 2021-12-11 PROCEDURE — 124N000003 HC R&B MH SENIOR/ADOLESCENT

## 2021-12-11 RX ADMIN — MIRTAZAPINE 15 MG: 15 TABLET, FILM COATED ORAL at 20:31

## 2021-12-11 RX ADMIN — ASPIRIN 81 MG CHEWABLE TABLET 81 MG: 81 TABLET CHEWABLE at 08:46

## 2021-12-11 RX ADMIN — MEGESTROL ACETATE 20 MG: 20 TABLET ORAL at 08:45

## 2021-12-11 RX ADMIN — BUSPIRONE HYDROCHLORIDE 15 MG: 15 TABLET ORAL at 08:46

## 2021-12-11 RX ADMIN — HALOPERIDOL 1 MG: 1 TABLET ORAL at 20:31

## 2021-12-11 RX ADMIN — ATORVASTATIN CALCIUM 80 MG: 80 TABLET, FILM COATED ORAL at 20:31

## 2021-12-11 RX ADMIN — MEMANTINE 10 MG: 10 TABLET ORAL at 20:31

## 2021-12-11 RX ADMIN — GABAPENTIN 100 MG: 100 CAPSULE ORAL at 17:37

## 2021-12-11 RX ADMIN — SALMETEROL XINAFOATE 1 PUFF: 50 POWDER, METERED ORAL; RESPIRATORY (INHALATION) at 08:46

## 2021-12-11 RX ADMIN — MEMANTINE 10 MG: 10 TABLET ORAL at 08:45

## 2021-12-11 RX ADMIN — BUSPIRONE HYDROCHLORIDE 15 MG: 15 TABLET ORAL at 14:26

## 2021-12-11 RX ADMIN — GABAPENTIN 100 MG: 100 CAPSULE ORAL at 08:45

## 2021-12-11 RX ADMIN — GABAPENTIN 100 MG: 100 CAPSULE ORAL at 12:13

## 2021-12-11 RX ADMIN — CLOZAPINE 200 MG: 100 TABLET ORAL at 20:31

## 2021-12-11 RX ADMIN — BUSPIRONE HYDROCHLORIDE 15 MG: 15 TABLET ORAL at 20:31

## 2021-12-11 RX ADMIN — SALMETEROL XINAFOATE 1 PUFF: 50 POWDER, METERED ORAL; RESPIRATORY (INHALATION) at 20:31

## 2021-12-11 RX ADMIN — LEVOTHYROXINE SODIUM 88 MCG: 88 TABLET ORAL at 08:46

## 2021-12-11 ASSESSMENT — ACTIVITIES OF DAILY LIVING (ADL)
DRESS: PROMPTS
HYGIENE/GROOMING: PROMPTS
ORAL_HYGIENE: PROMPTS
ORAL_HYGIENE: PROMPTS
HYGIENE/GROOMING: PROMPTS
DRESS: PROMPTS

## 2021-12-11 NOTE — PROGRESS NOTES
Pt appeared to be sleeping comfortably. Total sleep hours, about 11 hours. No concerns during the night. Will continue to monitor.

## 2021-12-11 NOTE — PROGRESS NOTES
"  PSYCHIATRIC PROGRESS NOTE    Admission Date; 06/30/2021  Date of Service: 12/10/2021    The patient was seen, his chart reviewed his case discussed with staff.   No significant changes noted in his mental status or attitude since I saw him last time over 2 months ago on [facility]. Since his transfer to [facility] he had spent most of the time in his room. He did not initiate any contacts with peers or staff. He has been irritable  With staff and with the undersigned yelling:\" Get me out of here!\" He has been medication compliant.   John s a 55-year-old single white male with a lifelong history of mental illness, TBI, neurocognitive disorder and polysubstance abuse. He has a history of 3 MI commitments and one CD commitment. This time he also was committed and Jarvised. He had spent several months at [facility] and was transferred here after being seen by Dr. Wilks for psychiatric consultation. He was initially seen and followed by Dr. Morfin and has been followed by Dr. Dobbins since 07/08/21. He has been under my care in July. His scheduled Risperdal was discontinued and he was started on Clozaril since his Yanez was amended. Subsequently I started him on Namenda and Exelon Patch. His Clozaril dose was slightly increased. He seems to have tolerated his medications well. Since I saw him last time in October BuSpar and Remeron were added to his regimen as well as Gabapentin. His Clozaril was decreased and a small dose of Haldol was added at bedtime. His Exelon Patch was discontinued mainly because of his refusal to take it    MEDICATIONS, psychotropic   Clozaril 200 mg at bedtime   Haldol 1 mg at bedtime  Gabapentin 100 mg TID  Buspar 10 mg TID  Remeron 15 mg at bedtime   Namenda 10 mg BID   Risperdal M-Tab 1 mg Q6H PRN   Melatonin 3 mg HS PRN     LABS: On 12/09/21 his CBC was WNL except for elevated Absolute Eosinophils. Blood Chemistry was remarkable for elevated Chloride of 113 " and slightly increased Magnesium    VS: Pulse - 102, BP - 91/69, T - 98.4, Resp - 16, SpO2 - 98%    MENTAL STATUS EXAMINATION   Revealed a normally built and normally developed 55-year-old white male appearing older than his stated age. He presented with involuntary tongue movement that may appear as if he was talking to himself. He presented with poverty of speech and paucity of ideas. His affect was blunted. He denied SI and HI. He was alert but oriented to self only. He denied hallucinations but appeared to be responding to internal stimuli. He had no insight into his problems and his judgement was impaired. He continued to present with marked impairment of his memory and cognition.     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 Continue the rest of medications without change.    Juarez Nickerson MD

## 2021-12-11 NOTE — PLAN OF CARE
"  Problem: Psychotic Symptoms  Goal: Psychotic Symptoms  Description: Signs and symptoms of listed problems will be absent or manageable.  Outcome: No Change  Flowsheets (Taken 12/11/2021 0971)  Psychotic Symptoms Assessed: all  Psychotic Symptoms Present:    mood    affect    thought process    insight    orientation    memory deficits    Pt continues to have poor insight in to his situation. Pt oriented only to himself. Pt declined to change his clothes this AM unless writer allowed him to put his \"street clothes\" on. Pt reported that he will be going home today.   Pt ate poorly at breakfast (25% of his scrambled eggs and 240 ml of 1% milk). Pt accepted 120 ml of supplement with his AM medications. Pt allowed vitals to be taken when he was lying but declined to sit or stand.  Pt agreed to take a shower at one point this AM. Pt walked to the shower with staff but refused to go into the shower room. Pt turned and went back to his room. Pt was unable to accept encouragement from staff to take a shower. Pt became irritated and stated that he would take one at 5 PM. Pt refused to change his clothing or to allow staff to assist him.   Pt has been voiding in his toilet at times so staff is unable to obtain adequate output. Pt continues to need a lot of encouragement to drink fluids. Pt does like milk and has been taking supplements but will only drink a small amount of time and staff needs to approach him often. Pt ate poorly at lunch (15%).     Pt continues to state that he wants to go home. When writer stated that we were tying to find him a safe place to live he stated \"I have a place to go.\" \"I have three mansions in [place]\".    Pt continues to have the need for a medical bed to aide in mobility as well ability to elevate the HOB due to patients diagnosis of COPD.     "

## 2021-12-11 NOTE — PLAN OF CARE
"Patient believes he is being discharged tonight and now refused a shower.  Requesting a cigarette and declines the nicotine lozenges.  Denies depression, SI or the wish to be dead.  Anxiety is rated at \"medium\".  Isolative to his room even with much encouragement to come out.   Poor appetite but taking adequate fluids.  P:  Continue same plan of care  "

## 2021-12-12 PROCEDURE — 250N000013 HC RX MED GY IP 250 OP 250 PS 637: Performed by: PSYCHIATRY & NEUROLOGY

## 2021-12-12 PROCEDURE — 124N000003 HC R&B MH SENIOR/ADOLESCENT

## 2021-12-12 RX ADMIN — MIRTAZAPINE 15 MG: 15 TABLET, FILM COATED ORAL at 19:37

## 2021-12-12 RX ADMIN — GABAPENTIN 100 MG: 100 CAPSULE ORAL at 17:23

## 2021-12-12 RX ADMIN — SALMETEROL XINAFOATE 1 PUFF: 50 POWDER, METERED ORAL; RESPIRATORY (INHALATION) at 19:38

## 2021-12-12 RX ADMIN — GABAPENTIN 100 MG: 100 CAPSULE ORAL at 08:56

## 2021-12-12 RX ADMIN — BUSPIRONE HYDROCHLORIDE 15 MG: 15 TABLET ORAL at 19:37

## 2021-12-12 RX ADMIN — ATORVASTATIN CALCIUM 80 MG: 80 TABLET, FILM COATED ORAL at 19:38

## 2021-12-12 RX ADMIN — SALMETEROL XINAFOATE 1 PUFF: 50 POWDER, METERED ORAL; RESPIRATORY (INHALATION) at 08:56

## 2021-12-12 RX ADMIN — MEGESTROL ACETATE 20 MG: 20 TABLET ORAL at 08:56

## 2021-12-12 RX ADMIN — BUSPIRONE HYDROCHLORIDE 15 MG: 15 TABLET ORAL at 08:56

## 2021-12-12 RX ADMIN — ASPIRIN 81 MG CHEWABLE TABLET 81 MG: 81 TABLET CHEWABLE at 08:56

## 2021-12-12 RX ADMIN — BUSPIRONE HYDROCHLORIDE 15 MG: 15 TABLET ORAL at 13:41

## 2021-12-12 RX ADMIN — LEVOTHYROXINE SODIUM 88 MCG: 88 TABLET ORAL at 08:56

## 2021-12-12 RX ADMIN — CLOZAPINE 200 MG: 100 TABLET ORAL at 19:37

## 2021-12-12 RX ADMIN — GABAPENTIN 100 MG: 100 CAPSULE ORAL at 12:18

## 2021-12-12 RX ADMIN — HALOPERIDOL 1 MG: 1 TABLET ORAL at 19:37

## 2021-12-12 RX ADMIN — MEMANTINE 10 MG: 10 TABLET ORAL at 19:37

## 2021-12-12 RX ADMIN — MEMANTINE 10 MG: 10 TABLET ORAL at 08:56

## 2021-12-12 ASSESSMENT — ACTIVITIES OF DAILY LIVING (ADL)
HYGIENE/GROOMING: PROMPTS
ORAL_HYGIENE: PROMPTS
HYGIENE/GROOMING: PROMPTS
DRESS: PROMPTS
DRESS: PROMPTS
ORAL_HYGIENE: PROMPTS

## 2021-12-12 ASSESSMENT — MIFFLIN-ST. JEOR: SCORE: 1619.15

## 2021-12-12 NOTE — PLAN OF CARE
Patient slept through the night with 10 hrs of sleep.  No concerns reported.  Continue with care plan.

## 2021-12-12 NOTE — PLAN OF CARE
"  Problem: Psychotic Symptoms  Goal: Psychotic Symptoms  Description: Signs and symptoms of listed problems will be absent or manageable.  Outcome: No Change  Flowsheets (Taken 12/12/2021 0918)  Psychotic Symptoms Assessed: all  Psychotic Symptoms Present:    mood    affect    thought process    insight    orientation    memory deficits   Pt continues to lack insight into his mental health/cognitive issues. Pt denies SI/SIB/wishes to be dead. Pt admits to being depressed due to hospitalization. Pt also admits to anxiety and stated \"Why the F--- am I still here\". Pt remains isolative and refused to get up for breakfast. Writer offered to assist patient with changing his clothing and bed linen, however he declined. Pt ate poorly for breakfast (50% of his oatmeal, few bites of egg, and 120 ml of supplement with AM medications). Pt is oriented to self only. Pt thought he was at \"[facility]\", the month was \"June\", the day of the week was \"Tuesday and the year was \"2099 almost 3000\".   Late morning patient was agreeable to orthostatic blood pressure as well as allowed writer to attempt to comb his hair. Pt noted to have a large mat of hair on the back of his neck. Pt also agreed to come to the lounge for a cup of coffee. Pt took approximately 120 ml of coffee and a cookie. Pt would only stay in the lounge for approximately 10 minutes and returned to his room. Bed linen was changed.   Pt declined to eat his lunch when tray presented. Pt did take a container of ice cream when writer fed this to him as well as 120 ml of H2 supplement.    Pt continues to use a medical bed to aid in mobility and elevation of head of bed due to diagnosis of COPD.    "

## 2021-12-13 LAB
ANION GAP SERPL CALCULATED.3IONS-SCNC: 8 MMOL/L (ref 3–14)
BUN SERPL-MCNC: 14 MG/DL (ref 7–30)
CALCIUM SERPL-MCNC: 9.1 MG/DL (ref 8.5–10.1)
CHLORIDE BLD-SCNC: 116 MMOL/L (ref 94–109)
CO2 SERPL-SCNC: 18 MMOL/L (ref 20–32)
CREAT SERPL-MCNC: 0.7 MG/DL (ref 0.66–1.25)
GFR SERPL CREATININE-BSD FRML MDRD: >90 ML/MIN/1.73M2
GLUCOSE BLD-MCNC: 92 MG/DL (ref 70–99)
MAGNESIUM SERPL-MCNC: 2 MG/DL (ref 1.6–2.3)
PHOSPHATE SERPL-MCNC: 3.8 MG/DL (ref 2.5–4.5)
POTASSIUM BLD-SCNC: 4 MMOL/L (ref 3.4–5.3)
SODIUM SERPL-SCNC: 142 MMOL/L (ref 133–144)

## 2021-12-13 PROCEDURE — 82310 ASSAY OF CALCIUM: CPT | Performed by: PHYSICIAN ASSISTANT

## 2021-12-13 PROCEDURE — 83735 ASSAY OF MAGNESIUM: CPT | Performed by: PHYSICIAN ASSISTANT

## 2021-12-13 PROCEDURE — 36415 COLL VENOUS BLD VENIPUNCTURE: CPT | Performed by: PHYSICIAN ASSISTANT

## 2021-12-13 PROCEDURE — 250N000013 HC RX MED GY IP 250 OP 250 PS 637: Performed by: PSYCHIATRY & NEUROLOGY

## 2021-12-13 PROCEDURE — 124N000003 HC R&B MH SENIOR/ADOLESCENT

## 2021-12-13 PROCEDURE — 84100 ASSAY OF PHOSPHORUS: CPT | Performed by: PHYSICIAN ASSISTANT

## 2021-12-13 RX ORDER — BUSPIRONE HYDROCHLORIDE 15 MG/1
30 TABLET ORAL 2 TIMES DAILY
Status: DISCONTINUED | OUTPATIENT
Start: 2021-12-13 | End: 2022-01-18 | Stop reason: HOSPADM

## 2021-12-13 RX ADMIN — BUSPIRONE HYDROCHLORIDE 30 MG: 15 TABLET ORAL at 21:07

## 2021-12-13 RX ADMIN — MIRTAZAPINE 15 MG: 15 TABLET, FILM COATED ORAL at 21:08

## 2021-12-13 RX ADMIN — GABAPENTIN 100 MG: 100 CAPSULE ORAL at 17:21

## 2021-12-13 RX ADMIN — HALOPERIDOL 1 MG: 1 TABLET ORAL at 21:07

## 2021-12-13 RX ADMIN — MEGESTROL ACETATE 20 MG: 20 TABLET ORAL at 08:40

## 2021-12-13 RX ADMIN — BUSPIRONE HYDROCHLORIDE 15 MG: 15 TABLET ORAL at 14:33

## 2021-12-13 RX ADMIN — CLOZAPINE 200 MG: 100 TABLET ORAL at 21:07

## 2021-12-13 RX ADMIN — GABAPENTIN 100 MG: 100 CAPSULE ORAL at 12:17

## 2021-12-13 RX ADMIN — SALMETEROL XINAFOATE 1 PUFF: 50 POWDER, METERED ORAL; RESPIRATORY (INHALATION) at 08:40

## 2021-12-13 RX ADMIN — LEVOTHYROXINE SODIUM 88 MCG: 88 TABLET ORAL at 08:39

## 2021-12-13 RX ADMIN — MEMANTINE 10 MG: 10 TABLET ORAL at 08:39

## 2021-12-13 RX ADMIN — GABAPENTIN 100 MG: 100 CAPSULE ORAL at 08:40

## 2021-12-13 RX ADMIN — SALMETEROL XINAFOATE 1 PUFF: 50 POWDER, METERED ORAL; RESPIRATORY (INHALATION) at 21:07

## 2021-12-13 RX ADMIN — MEMANTINE 10 MG: 10 TABLET ORAL at 21:07

## 2021-12-13 RX ADMIN — ASPIRIN 81 MG CHEWABLE TABLET 81 MG: 81 TABLET CHEWABLE at 08:39

## 2021-12-13 RX ADMIN — BUSPIRONE HYDROCHLORIDE 15 MG: 15 TABLET ORAL at 08:39

## 2021-12-13 RX ADMIN — ATORVASTATIN CALCIUM 80 MG: 80 TABLET, FILM COATED ORAL at 21:07

## 2021-12-13 ASSESSMENT — ACTIVITIES OF DAILY LIVING (ADL)
ORAL_HYGIENE: PROMPTS
HYGIENE/GROOMING: PROMPTS
DRESS: PROMPTS;WITH ASSISTANCE
LAUNDRY: UNABLE TO COMPLETE
HYGIENE/GROOMING: PROMPTS;WITH ASSISTANCE
DRESS: PROMPTS
ORAL_HYGIENE: PROMPTS

## 2021-12-13 NOTE — PLAN OF CARE
BEHAVIORAL TEAM DISCUSSION     Participants: Juarez Nickerson MD; Lukas Stearns Marijo C, RN; Joi MALDONADO OT; COLETTE [facility];      Progress: Pt was transferred from [facility] 10. Assessment is ongoing to determine adjustments and implementations necessary for continued wellness. Noted for decline, physically.  Pt is getting out of bed, occasionally for meals.  Medical work- up has not yielded an origin of his malaise.     Anticipated length of stay:    unknown - may be transferred to medicine.     Continued Stay Criteria/Rationale:   The pt is not able to care for himself. Pt is still experiencing psychosis, talking to himself.        Medical/Physical: See medical for details  Needs 2 staff to transfe     Precautions:           Behavioral Orders   Procedures     Assault precautions     Cheeking Precautions (behavioral units)       Patient Observed swallowing PO medications; Patient asked to drink water after swallowing medication; Patient in Staff line of sight for 15 minutes after medication given; Mouth checks after PO administration (patient asked to open mouth and stick out their tongue).     Code 1     Code 2       With Security for any imaging/testing needed.     Elopement precautions     Fall precautions     Routine Programming       As clinically indicated     Single Room     Status 15       Every 15 minutes.      Plan:   Help pt eat  Medication Management  Ethics consultations as needed  Medications:MEDICATIONS, psychotropic   Clozaril 200 mg at bedtime   Haldol 1 mg at bedtime   Gabapentin 100 mg TID   Buspar 15 mg TID   Remeron 15 mg at bedtime   Namenda 10 mg BID   Risperdal M-Tab 1 mg Q6H PRN   Melatonin 3 mg HS PRN         Pt will be encouraged to engaged in the therapeutic milieu  Pt will be encouraged to attend groups to learn and practice positive skills to cope with his symptoms  Medical consultations will be implemented as needed  Care coordination will be implemented.

## 2021-12-13 NOTE — PROGRESS NOTES
Brief Medicine Progress Note:    Reviewed labs from this morning: Notable for Chloride 116 and CO2 18 on BMP. Electrolytes and renal function otherwise normal. Discussed with nursing and recommended encourage free water intake. Chloride level has been slowly rising since Ensure was recommended TID ~30 days ago. Suspect rising chloride related to poor free water intake in the setting of Ensure and Gatorade use (contains potassium chloride, chromium chloride, etc.)    Temp: 97  F (36.1  C) Temp src: Temporal BP: 111/80 Pulse: 96   Resp: 16 SpO2: 98 % O2 Device: None (Room air)      Will recheck BMP tomorrow to determine if additional interventions are needed.    Medicine team will continue to follow in the periphery.  Gordo Jordan PA-C on 12/13/2021 at 10:36 AM    This is a non-billable encounter.

## 2021-12-13 NOTE — PLAN OF CARE
Patient  continues to isolate to  his room. Walked out to the lounge once and returned immediately to his room.  Declined his dinner tonight.  States he is too tired to do anything tonight in terms of activity.  Med compliant.  No new concerns.  Ate only 1/2 dish of applesauce tonight, drank 1/2 carton of milk and aprox 125cc of lemonade.  P:  Continue same plan of care.

## 2021-12-13 NOTE — PROGRESS NOTES
CLINICAL NUTRITION SERVICES - REASSESSMENT NOTE     Nutrition Prescription    RECOMMENDATIONS FOR MDs/PROVIDERS TO ORDER:  Please note continued weight loss, 30 lbs since admit to facility. If it were decided it was deemed appropriate and or pt agreeing to consider nutrition support, please consult RD services for this.      Malnutrition Status:    Severe malnutrition in the context of acute on chronic illness     Recommendations already ordered by Registered Dietitian (RD):  Allow write ins: ham and american cheese sandwich, mandarin oranges     Future/Additional Recommendations:  Continue to monitor meal/supplement intakes and update orders as needed  Continue to monitor weight/lab trends as available      EVALUATION OF THE PROGRESS TOWARD GOALS   Diet: mechanical/dental soft  Supplements: any PRN  Intake: 0-50% of meals per flowsheet     NEW FINDINGS   Pt oriented to self only. Spoke to RN who reported no improvements in PO. Sated he is mainly taking fluids and minimal solids. He requires frequent encouragement to eat/drink but will frequently refuse help/kick staff out of his room. Pt unable to state why his appetite has been decreased. He did provide some preferences (ham and cheese sandwich, mandarin oranges and sour cream and onion chips). All are available/meet diet restrictions besides chips. RD added write in options. Pt has an order for PRN supplements, stated he enjoys chocolate ensure. Can schedule once pt has gone through stock of supplements previously sent.   7# (4%) wt loss in 1 month, 30# (15.3%) wt loss in 6 months   12/12/21 75.6 kg (166 lb 11.2 oz)   12/07/21 75.4 kg (166 lb 4.8 oz)   11/23/21 77.6 kg (171 lb 1.6 oz)   11/16/21 78.8 kg (173 lb 11.6 oz)   10/12/21 87.4 kg (192 lb 9.6 oz)   09/28/21 89.3 kg (196 lb 14.4 oz)   08/29/21 89.5 kg (197 lb 4.8 oz)   07/22/21 89.2 kg (196 lb 12.8 oz)   06/30/21 89.1 kg (196 lb 8 oz)-admit   03/31/21 84.2 kg (185 lb 9.6 oz)   08/26/20 70.3 kg (155 lb)    07/11/20 69.9 kg (154 lb)   06/27/20 70.3 kg (155 lb)   02/04/20 67.6 kg (149 lb)   01/20/20 69.2 kg (152 lb 9 oz)   09/03/18 78 kg (172 lb)     MALNUTRITION  % Intake: </=50% for >/= 1 month (severe)  % Weight Loss: > 10% in 6 months (severe)}  Subcutaneous Fat Loss: None observed-visual only  Muscle Loss: Temporal:  mild, Facial & jaw region:  moderate and Posterior calf:  Moderate-visual only   Fluid Accumulation/Edema: None noted  Malnutrition Diagnosis: Severe malnutrition in the context of acute on chronic illness     Previous Goals   Patient to consume % of nutritionally adequate meal trays TID, or the equivalent with supplements/snacks.  Evaluation: Not met    Previous Nutrition Diagnosis  Inadequate oral intake of unclear etiology (menu fatigue, mental healthy) as evidenced by poor intake and severe weight loss   Evaluation: No change    CURRENT NUTRITION DIAGNOSIS  Inadequate oral intake of unclear etiology (menu fatigue, mental healthy) as evidenced by poor intake and severe weight loss     INTERVENTIONS  Implementation  Allow write ins: ham and american cheese sandwich, mandarin oranges     Goals  Patient to consume % of nutritionally adequate meal trays TID, or the equivalent with supplements/snacks.    Monitoring/Evaluation  Progress toward goals will be monitored and evaluated per protocol.    Sharyn Carens MS, RD, LDN  Unit Pager [phone]

## 2021-12-13 NOTE — PLAN OF CARE
"  Problem: Behavioral Health Plan of Care  Goal: Absence of New-Onset Illness or Injury  Intervention: Identify and Manage Fall Risk  Recent Flowsheet Documentation  Taken 12/13/2021 1051 by Una Gaytan RN  Safety Measures:   safety rounds completed   environmental rounds completed     Problem: Psychotic Symptoms  Goal: Psychotic Symptoms  Description: Signs and symptoms of listed problems will be absent or manageable.  Outcome: No Change  Flowsheets (Taken 12/13/2021 1057)  Psychotic Symptoms Assessed: all  Psychotic Symptoms Present:   affect   mood   speech   thought process   insight   orientation   memory deficits    Pt remains isolative and withdrawn. Pt continues to refuse/decline assistance to do ADL's. Pt's hygiene is poor. Pt was asked about staff assisting him with a haircut as he has a large mat of hair on the back of his neck he replied \"later\".   Pt continues to have a poor solid intake (75% of Panamanian toast this AM). Pt did take 200 ml of whole milk, 120 ml of crystal light lemonade, and 90 ml of orange Gatorade this AM.   Medical team is requesting staff to encourage patient to drink free water as his chloride level has increased.   Pt is oriented only to himself. Pt lacks insight into his situation and states \"when can I go home\". Pt aware that he is in a hospital but believes he is at [facility]. Pt has been calm during interactions and has had not aggression. Pt's eye contact is inconsistent and his responses are superficial and at times he will mumble responses.   Pt continues to need a medical bed to aid in mobility as well has head elevation due to COPD.      "

## 2021-12-14 LAB
ANION GAP SERPL CALCULATED.3IONS-SCNC: 6 MMOL/L (ref 3–14)
BUN SERPL-MCNC: 14 MG/DL (ref 7–30)
CALCIUM SERPL-MCNC: 9 MG/DL (ref 8.5–10.1)
CHLORIDE BLD-SCNC: 116 MMOL/L (ref 94–109)
CO2 SERPL-SCNC: 19 MMOL/L (ref 20–32)
CREAT SERPL-MCNC: 0.66 MG/DL (ref 0.66–1.25)
GFR SERPL CREATININE-BSD FRML MDRD: >90 ML/MIN/1.73M2
GLUCOSE BLD-MCNC: 100 MG/DL (ref 70–99)
POTASSIUM BLD-SCNC: 4 MMOL/L (ref 3.4–5.3)
SODIUM SERPL-SCNC: 141 MMOL/L (ref 133–144)

## 2021-12-14 PROCEDURE — 124N000003 HC R&B MH SENIOR/ADOLESCENT

## 2021-12-14 PROCEDURE — 80048 BASIC METABOLIC PNL TOTAL CA: CPT | Performed by: PHYSICIAN ASSISTANT

## 2021-12-14 PROCEDURE — 250N000013 HC RX MED GY IP 250 OP 250 PS 637: Performed by: PSYCHIATRY & NEUROLOGY

## 2021-12-14 PROCEDURE — 36415 COLL VENOUS BLD VENIPUNCTURE: CPT | Performed by: PHYSICIAN ASSISTANT

## 2021-12-14 RX ADMIN — MEMANTINE 10 MG: 10 TABLET ORAL at 20:02

## 2021-12-14 RX ADMIN — GABAPENTIN 100 MG: 100 CAPSULE ORAL at 11:42

## 2021-12-14 RX ADMIN — ASPIRIN 81 MG CHEWABLE TABLET 81 MG: 81 TABLET CHEWABLE at 08:33

## 2021-12-14 RX ADMIN — GABAPENTIN 100 MG: 100 CAPSULE ORAL at 08:33

## 2021-12-14 RX ADMIN — SALMETEROL XINAFOATE 1 PUFF: 50 POWDER, METERED ORAL; RESPIRATORY (INHALATION) at 20:03

## 2021-12-14 RX ADMIN — LEVOTHYROXINE SODIUM 88 MCG: 88 TABLET ORAL at 08:32

## 2021-12-14 RX ADMIN — CLOZAPINE 200 MG: 100 TABLET ORAL at 20:02

## 2021-12-14 RX ADMIN — MIRTAZAPINE 15 MG: 15 TABLET, FILM COATED ORAL at 20:02

## 2021-12-14 RX ADMIN — GABAPENTIN 100 MG: 100 CAPSULE ORAL at 17:38

## 2021-12-14 RX ADMIN — MEGESTROL ACETATE 20 MG: 20 TABLET ORAL at 08:32

## 2021-12-14 RX ADMIN — SALMETEROL XINAFOATE 1 PUFF: 50 POWDER, METERED ORAL; RESPIRATORY (INHALATION) at 08:33

## 2021-12-14 RX ADMIN — HALOPERIDOL 1 MG: 1 TABLET ORAL at 20:02

## 2021-12-14 RX ADMIN — ATORVASTATIN CALCIUM 80 MG: 80 TABLET, FILM COATED ORAL at 20:02

## 2021-12-14 RX ADMIN — MEMANTINE 10 MG: 10 TABLET ORAL at 08:33

## 2021-12-14 RX ADMIN — BUSPIRONE HYDROCHLORIDE 30 MG: 15 TABLET ORAL at 08:32

## 2021-12-14 RX ADMIN — BUSPIRONE HYDROCHLORIDE 30 MG: 15 TABLET ORAL at 20:02

## 2021-12-14 ASSESSMENT — ACTIVITIES OF DAILY LIVING (ADL)
HYGIENE/GROOMING: PROMPTS
HYGIENE/GROOMING: PROMPTS
DRESS: SCRUBS (BEHAVIORAL HEALTH);PROMPTS
ORAL_HYGIENE: PROMPTS
DRESS: SCRUBS (BEHAVIORAL HEALTH);PROMPTS
ORAL_HYGIENE: PROMPTS

## 2021-12-14 NOTE — PLAN OF CARE
"  Problem: Psychotic Symptoms  Goal: Social and Therapeutic (Psychotic Symptoms)  Description: Signs and symptoms of listed problems will be absent or manageable.  Recent Flowsheet Documentation  Taken 12/14/2021 1156 by Una Gaytan RN  Psychotic Symptoms Assessed: all  Psychotic Symptoms Present:    mood    speech    affect    thought process    insight    orientation    memory deficits    Pt continues to lack  insight into his mental health/cognitive issues. Pt continues to report that he is at [facility] even though he has been re oriented daily that this is not  the case. Pt has been more verbal today and he has been irritable at  times. When writer approached him he stated \"that f--- whore\". When asked who he was referring to he stated \"my ex girlfriend said I didn't pay her my last rent payment\". Pt is easily distracted and his breakfast tray was brought to him. Pt ate 30% of his breakfast this AM (25% scrambled eggs, 50% blue berry muffin, 180 ml of whole milk, 50% fruit loops). Pt has taken 200 ml of supplement drink this AM.   During the late morning patient asked when he would be able to leave. Pt was reminded that due to his commitment we had to wait for the [facility] to find a place for him to live. Pt went on  to say \"I'm not under commitment anymore\". \"Regan Eaton told me that the commitment was done\". \"Get me a cigarette\".   Pt is medication compliant. Pt has refused to come out to the [facility]. When patient was approached at 1345 he agreed to change his clothing and he was able to do jeanie care with prompts. Pt incontinent brief was dry.   Pt continues to have the need for a medical bed to aide in mobility as well as ability to raise the head of the bed due to COPD.   Pt had 200 ml of orange colored urine emptied from collection  Hat. Pt does urinate in the toilet as well at times.   "

## 2021-12-14 NOTE — PLAN OF CARE
Assessment/Intervention/Current Symptoms and Care Coordination:    Current Symptoms include the following: Confusion, delusional thinking, irritability, oriented to only self, and to other, and poor nutrition.    Chart Review    Met with this patient today.    Spoke with this pt's  (at [facility]) this morning. He reported he has not heard any new thing related to this patient. Said he spoke with Camacho BENNETT at the 's office about a month ago. Writer updated  on pt's current status.  reported he would like to come and visit the patient. Visit will be today at 10:30am. Pt had a visit with his , today.        Discharge Plan or Goal:  Pending stabilization & development of a safe discharge plan.  Considerations include: Nursing home. Guardianship process is being pursued.     Barriers to Discharge:  Patient requires further psychiatric stabilization due to current symptomology, Medication management with possible adjustments and guardianship     Referral Status:  No new referrals made.     Legal Status:  Patient is under MI commitment in [facility] with Yanez. [id number]        Contacts:  Case Management Services:  [facility]   Kurtis Chapin. Phone: [phone]  Supervisor is [email or web address]     Freida Machuca  Sister  [phone]

## 2021-12-14 NOTE — PLAN OF CARE
Patient states his anxiety and depression are high because he cant go home yet.  Denies SI or the wish to be dead.  Isolates to his room most of the time.  Takes much encouragement to come out to the lounge or to eat.  Appetite ;much improved tonight and ate ate least 65% of his meal.  Attended music group tonight and states that he enjoyed it.  P: continue same plan of care.

## 2021-12-14 NOTE — PROGRESS NOTES
PSYCHIATRIC PROGRESS NOTE    Admission Date: 06/30/2021  Date of Service: 12/13/2021    The patient was sen in his room, his chart reviewed, his case discussed with staff at the Team Meeting. Nutrition consult was noted and appreciated.  The patient continued to be withdrawn and asocial. He has been refusing his meals at times. He lost about 14 lbs of weight since October. He has been spending majority of time in his room. He has been disheveled and unkempt but has been refusing assistance for his ADLs. He has been irritable upon approach.    This is a 55-year-old single white male with a lifelong history of mental illness, TBI, neurocognitive disorder and polysubstance abuse. He has a history of 3 MI commitments and one CD commitment. This time he also was committed and Jarvised. He had spent several months at [facility] and was transferred here after being seen by Dr. Wilks for psychiatric consultation. He was initially seen and followed by Dr. Morfin and has been followed by Dr. Dobbins since 07/08/21. He has been under my care in July. His scheduled Risperdal was discontinued and he was started on Clozaril since his Yanez was amended. Subsequently I started him on Namenda and Exelon Patch. His Clozaril dose was slightly increased. He seems to have tolerated his medications well. His Clozaril levels were therapeutic but became too high at the end of October and his Clozaril dose was decreased   Since I saw him last time in October BuSpar and Remeron were added to his regimen as well as Gabapentin and a small dose of Haldol was added at bedtime. His Exelon Patch was discontinued mainly because of his refusal to take it.    MEDICATIONS, psychotropic   Clozaril 200 mg at bedtime   Haldol 1 mg at bedtime   Gabapentin 100 mg TID   Buspar 15 mg TID   Remeron 15 mg at bedtime   Namenda 10 mg BID   Risperdal M-Tab 1 mg Q6H PRN   Melatonin 3 mg HS PRN     LABS: His Blood Chemistry on  12/13/21 was remarkable for elevate Chloride and decreased Carbon Dioxide. His blood glucose was 92. His CBC on 12/09/21 was entirely WNL    VS: Pulse - 100, BP - 110/82, T - 97.3, Resp - 16, SpO2 - 99%    MENTAL STATUS EXAMINATION   Revealed a normally built and normally developed 55-year-old white male appearing older than his stated age. He presented with involuntary tongue movements. He presented with poverty of speech and paucity of ideas. His affect was blunted. He denied SI and HI. He was alert but oriented to self only. He denied hallucinations but appeared to be responding to internal stimuli. He had no insight into his problems and his judgement was impaired. He continued to present with marked impairment of his memory and cognition.     DIAGNOSTIC IMPRESSION   Schizophrenia Chronic, undifferentiated type   Neurocognitive Disorder secondary to TBI   Tardive Dyskinesia   Alcohol Use Disorder, in controlled environment   Stimulant Use Disorder, in controlled environment     Plan: I will further increase BuSpar to 30 mg BID.    Juarez Nickerson MD

## 2021-12-14 NOTE — PLAN OF CARE
CTC: - Meeting with Case Management and 's Office for Care Coordination:    Scheduled a meeting with the case management, [facility] and Camacho Macario at the [facility] Attorney's Office. This meeting will be via [employer] Teams, on December 17 2021 at noon.

## 2021-12-14 NOTE — PLAN OF CARE
"  Problem: Psychotic Symptoms  Goal: Psychotic Symptoms  Description: Signs and symptoms of listed problems will be absent or manageable.  Outcome: No Change  Flowsheets (Taken 12/13/2021 1928)  Psychotic Symptoms Assessed: all  Psychotic Symptoms Present:    anxiety    affect    insight    mood    speech    thought process   Patient is isolative and withdrawn. He kept himself to his room. He only came out during dinner time. He ate only about 25% of the solid food but consumed almost 100% of his liquid food like milk and Gatorade. His mood is calm but with flat and blunt affect. He doesn't talk much. He mumbles when he answers questions during the 1:1 check-in. However, he denies SI/SIB nor hallucinations. He remains consistent of his desire to go out from this unit. His Buspar was increased from 15 mgs 3x/day to 30 mgs 2x/day. He took all his bedtime medications without cheeking as checked. For the first time, this writer heard the patient said \"Hever\".    Intake- 640 ml  Output- 100 ml (clear, dark yellow urine).  "

## 2021-12-14 NOTE — PLAN OF CARE
Ongoing SW/CM Assessment/Plan of Care Note     See SW/CM flowsheets for goals and other objective data.    Patient/Family discharge goal (s):  Goal #1: Communication facilitated  Goal #2: Establish present or future health care decison-maker  Goal #3: Education/provision of information (community resources)    PT Recommendation:  Recommendation for Discharge: PT WI: Less intensive rehab       OT Recommendation:  Recommendations for Discharge: OT WI: Less intensive rehab       SLP Recommendation:  Recommendations for Discharge: SLP: do not anticipate post acute ST if continuing to tolerate current general/thin liquid diet.    Disposition:  Planned Discharge Destination: Rehabilitation/Skilled Care    Progress note:   Chart reviewed and SW participated in OFTs for pt.  Referrals sent to [facility] and pt accepted at [facility] and [facility].  Pts family prefers [facility].  Able to accept tomorrow 12/15.    SW reached out to pts granddaughter Claudette to verify plan.  She reports that pts dtr will transport her to facility at the time of the discharge.  They do not want ambulance.    Plan for discharge to [facility] around 1-130 via family tomorrow 12/15/21.   Sw to follow for discharge.           DATE & TIME: 11/3/20 4875-2921    Cognitive Concerns/ Orientation : Oriented to self   BEHAVIOR & AGGRESSION TOOL COLOR: Green. Calm/cooperative   CIWA SCORE: N/a   ABNL VS/O2: VSS on RA  MOBILITY: Independent  PAIN MANAGMENT: Denies pain  DIET: Regular  BOWEL/BLADDER: Continent  ABNL LAB/BG: N/A  DRAIN/DEVICES: No IV access  TELEMETRY RHYTHM: N/A  SKIN: WDL, ex cut on forehead  TESTS/PROCEDURES: Non  D/C DAY/GOALS/PLACE: TBD  OTHER IMPORTANT INFO: Court hold. Door alarm. Nicotine patch on R shoulder.

## 2021-12-14 NOTE — PLAN OF CARE
Problem: Sleep Disturbance  Goal: Adequate Sleep/Rest  Outcome: No Change     Patient slept a total of 12 hours without any complaints made the whole shift. Refused to get up from bed when asked to go to the bathroom.    Intake-0. Output-0.

## 2021-12-15 PROCEDURE — 250N000013 HC RX MED GY IP 250 OP 250 PS 637: Performed by: PSYCHIATRY & NEUROLOGY

## 2021-12-15 PROCEDURE — 124N000003 HC R&B MH SENIOR/ADOLESCENT

## 2021-12-15 RX ADMIN — GABAPENTIN 100 MG: 100 CAPSULE ORAL at 17:18

## 2021-12-15 RX ADMIN — MEGESTROL ACETATE 20 MG: 20 TABLET ORAL at 08:51

## 2021-12-15 RX ADMIN — LEVOTHYROXINE SODIUM 88 MCG: 88 TABLET ORAL at 08:50

## 2021-12-15 RX ADMIN — HALOPERIDOL 1 MG: 1 TABLET ORAL at 20:50

## 2021-12-15 RX ADMIN — ATORVASTATIN CALCIUM 80 MG: 80 TABLET, FILM COATED ORAL at 20:50

## 2021-12-15 RX ADMIN — MIRTAZAPINE 15 MG: 15 TABLET, FILM COATED ORAL at 20:50

## 2021-12-15 RX ADMIN — MEMANTINE 10 MG: 10 TABLET ORAL at 20:50

## 2021-12-15 RX ADMIN — BUSPIRONE HYDROCHLORIDE 30 MG: 15 TABLET ORAL at 08:51

## 2021-12-15 RX ADMIN — SALMETEROL XINAFOATE 1 PUFF: 50 POWDER, METERED ORAL; RESPIRATORY (INHALATION) at 08:50

## 2021-12-15 RX ADMIN — CLOZAPINE 200 MG: 100 TABLET ORAL at 20:50

## 2021-12-15 RX ADMIN — BUSPIRONE HYDROCHLORIDE 30 MG: 15 TABLET ORAL at 20:50

## 2021-12-15 RX ADMIN — SALMETEROL XINAFOATE 1 PUFF: 50 POWDER, METERED ORAL; RESPIRATORY (INHALATION) at 20:50

## 2021-12-15 RX ADMIN — GABAPENTIN 100 MG: 100 CAPSULE ORAL at 08:51

## 2021-12-15 RX ADMIN — MEMANTINE 10 MG: 10 TABLET ORAL at 08:50

## 2021-12-15 RX ADMIN — GABAPENTIN 100 MG: 100 CAPSULE ORAL at 12:11

## 2021-12-15 RX ADMIN — ASPIRIN 81 MG CHEWABLE TABLET 81 MG: 81 TABLET CHEWABLE at 08:51

## 2021-12-15 ASSESSMENT — ACTIVITIES OF DAILY LIVING (ADL)
ORAL_HYGIENE: PROMPTS
ORAL_HYGIENE: PROMPTS
HYGIENE/GROOMING: PROMPTS
DRESS: PROMPTS
HYGIENE/GROOMING: PROMPTS
DRESS: SCRUBS (BEHAVIORAL HEALTH)

## 2021-12-15 NOTE — PLAN OF CARE
Problem: Sleep Disturbance  Goal: Adequate Sleep/Rest  Outcome: No Change      Patient slept total of 9.5 hours. No complaints made the whole shift.  Intake -0. Output-0.

## 2021-12-15 NOTE — PLAN OF CARE
"Patient alert and oriented to person, place, and time, untidy - refuses to shower. Pt exhibits flat affect, irritable, medication compliant, independent ADL's. No PRN's given. Pt on falls, assault, cheeking, elopement precautions, no behaviors observed. Pt isolative in room, out for meals, refuses engagement with this writer when offered to play cards/games or converse with pt in lounge area or in room. Pt denies pain, refuses assessment stating, \"when can I go home?\" Pt out of room for lunch. Will continue to monitor behavior and encourage engagement.     NURSING ASSESSMENT  Pain: denies  Anxiety: refuses to answer  Depression: refuses to answer  SI: refuses to answer  SIB: refuses to answer  HI: refuses to answer  AVH: refuses to answer, did not demonstrate delusional thinking, did not appear to be responding to internal stimuli.   Mood/Affect: flat, irritable  Sleep: refuses to answer  Medication: compliant, no side effects reported, none observed  PRN: none  Appetite: breakfast 0%    Lunch 25%  ADLs: independent   Visits: none  Vitals: WNL   Medical: refuses to answer  "

## 2021-12-16 LAB — SARS-COV-2 RNA RESP QL NAA+PROBE: NEGATIVE

## 2021-12-16 PROCEDURE — U0005 INFEC AGEN DETEC AMPLI PROBE: HCPCS | Performed by: PSYCHIATRY & NEUROLOGY

## 2021-12-16 PROCEDURE — 124N000003 HC R&B MH SENIOR/ADOLESCENT

## 2021-12-16 PROCEDURE — 250N000013 HC RX MED GY IP 250 OP 250 PS 637: Performed by: PSYCHIATRY & NEUROLOGY

## 2021-12-16 RX ADMIN — MEGESTROL ACETATE 20 MG: 20 TABLET ORAL at 07:57

## 2021-12-16 RX ADMIN — BUSPIRONE HYDROCHLORIDE 30 MG: 15 TABLET ORAL at 21:06

## 2021-12-16 RX ADMIN — ATORVASTATIN CALCIUM 80 MG: 80 TABLET, FILM COATED ORAL at 21:07

## 2021-12-16 RX ADMIN — GABAPENTIN 100 MG: 100 CAPSULE ORAL at 12:06

## 2021-12-16 RX ADMIN — CLOZAPINE 200 MG: 100 TABLET ORAL at 21:07

## 2021-12-16 RX ADMIN — SALMETEROL XINAFOATE 1 PUFF: 50 POWDER, METERED ORAL; RESPIRATORY (INHALATION) at 21:07

## 2021-12-16 RX ADMIN — HALOPERIDOL 1 MG: 1 TABLET ORAL at 21:07

## 2021-12-16 RX ADMIN — ASPIRIN 81 MG CHEWABLE TABLET 81 MG: 81 TABLET CHEWABLE at 07:57

## 2021-12-16 RX ADMIN — MIRTAZAPINE 15 MG: 15 TABLET, FILM COATED ORAL at 21:07

## 2021-12-16 RX ADMIN — MEMANTINE 10 MG: 10 TABLET ORAL at 21:06

## 2021-12-16 RX ADMIN — BUSPIRONE HYDROCHLORIDE 30 MG: 15 TABLET ORAL at 07:57

## 2021-12-16 RX ADMIN — SALMETEROL XINAFOATE 1 PUFF: 50 POWDER, METERED ORAL; RESPIRATORY (INHALATION) at 07:57

## 2021-12-16 RX ADMIN — LEVOTHYROXINE SODIUM 88 MCG: 88 TABLET ORAL at 07:57

## 2021-12-16 RX ADMIN — GABAPENTIN 100 MG: 100 CAPSULE ORAL at 07:57

## 2021-12-16 RX ADMIN — MEMANTINE 10 MG: 10 TABLET ORAL at 07:57

## 2021-12-16 RX ADMIN — GABAPENTIN 100 MG: 100 CAPSULE ORAL at 16:46

## 2021-12-16 ASSESSMENT — ACTIVITIES OF DAILY LIVING (ADL)
HYGIENE/GROOMING: PROMPTS
LAUNDRY: UNABLE TO COMPLETE
ORAL_HYGIENE: PROMPTS
ORAL_HYGIENE: PROMPTS;INDEPENDENT
DRESS: SCRUBS (BEHAVIORAL HEALTH);INDEPENDENT
LAUNDRY: UNABLE TO COMPLETE
DRESS: PROMPTS

## 2021-12-16 NOTE — PLAN OF CARE
Problem: Psychotic Symptoms  Goal: Psychotic Symptoms  Description: Signs and symptoms of listed problems will be absent or manageable.  Outcome: No Change  Flowsheets  Taken 12/16/2021 1241 by Jonelle Delatorre RN  Psychotic Symptoms Present:    insight    affect    mood    thought process    speech    orientation    memory deficits  Taken 12/14/2021 1156 by Una Gaytan RN  Psychotic Symptoms Assessed: all     Patient remains isolative and withdrawn. He stayed in his room throughout the shift. Staff and this writer encouraged him to come out but he refused. Patient did not eat his breakfast instead he ate it almost everything during lunch time. Patient is very minimal in his responses during the 1;1 check-in. He denied SI/SIB nor hallucinations. No side effects of the medications reported. He is untidy and disheveled. He refused to have a shower when offered by this writer. He also refused to have his blood drawn for labs today. His thoughts are disorganized and is distractable. He took all his morning and noontime meds. He slept well last night for a total of 11.75 hours.    Intake - 600  Output- 220 clear, franko-colored urine.

## 2021-12-16 NOTE — PLAN OF CARE
Assessment/Intervention/Current Symptoms and Care Coordination:    Current Symptoms include the following: Confusion, delusional thinking, irritability, oriented to only self, and to other, and poor nutrition.    Chart Review    Met with this patient today.    A meeting with [facility] Attorney's Office, pt's  and the hospital in respect of pt's guardianship will happen tomorrow at 12:00pm        Discharge Plan or Goal:  Pending stabilization & development of a safe discharge plan.  Considerations include: Nursing home. Guardianship process is being pursued.     Barriers to Discharge:  Patient requires further psychiatric stabilization due to current symptomology, Medication management with possible adjustments and guardianship     Referral Status:  No new referrals made.     Legal Status:  Patient is under MI commitment in [facility] with Yanez. [id number]        Contacts:  Case Management Services:  [facility]   Kurtis Chapin. Phone: [phone]  Supervisor is [email or web address]     Freida Machuca  Sister  [phone]

## 2021-12-16 NOTE — PROGRESS NOTES
PSYCHIATRIC PROGRESS NOTE    Admission Date: 06/30/2021  Date of Service: 12/15/2021    The patient was seen, his chart reviewed, his case discussed with staff at the Team Meeting.  Reportedly, the Guardianship has not been yet established and therefore the patient could not be transferred to the nursing home as planned. The meeting with Case Management and  will take place on Friday to discuss his options.  In a mean time the patient continued to spend the entire time in his room napping. He would get irritable and belligerent upon approach but showed no aggressive behavior. He appeared to be somewhat anxious. He slept well last night and his appetite had improved. He attended one music group yesterday.    John is a 55-year-old single white male with a lifelong history of mental illness, TBI, neurocognitive disorder and polysubstance abuse. He has a history of 3 MI commitments and one CD commitment. This time he also was committed and Jarvised. He had spent several months at [facility] and was transferred here after being seen by Dr. Wilks for psychiatric consultation. He was initially seen and followed by Dr. Morfin and has been followed by Dr. Dobbins since 07/08/21. He has been under my care in July. His scheduled Risperdal was discontinued and he was started on Clozaril since his Yanez was amended. Subsequently I started him on Namenda and Exelon Patch. His Clozaril dose was slightly increased. He seems to have tolerated his medications well. His Clozaril levels were therapeutic but became too high at the end of October and his Clozaril dose was decreased. Since I saw him in October BuSpar and Remeron were added to his regimen as well as Gabapentin and a small dose of Haldol was added at bedtime. His Exelon Patch was discontinued mainly because of his refusal to take it. When I saw him last time I increased his BuSpar.    MEDICATIONS, psychotropic   Clozaril 200  mg at bedtime   Haldol 1 mg at bedtime   Gabapentin 100 mg TID   Buspar 30 mg BID   Remeron 15 mg at bedtime   Namenda 10 mg BID   Risperdal M-Tab 1 mg Q6H PRN   Melatonin 3 mg HS PRN     LABS: His Blood Chemistry as of yesterday was marked by still elevated Chloride at 116 and low Carbon Dioxide. His blood Glucose was 100.    MENTAL STATUS EXAMINATION   Revealed a normally built and normally developed, disheveled and unkempt 55-year-old white male appearing older than his stated age. He presented with involuntary tongue movements. He presented with poverty of speech and paucity of ideas. His affect was blunted. He denied SI and HI. He was alert but oriented to self only. He denied hallucinations but appeared to be responding to internal stimuli. He had no insight into his problems and his judgement was impaired. He continued to present with marked impairment of his memory and cognition.     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 Continue medications without change.    Juarez Nickerson MD

## 2021-12-16 NOTE — PLAN OF CARE
Problem: Sleep Disturbance  Goal: Adequate Sleep/Rest  Outcome: No Change     Patient slept a total of 11.75 hours. Patient woke up at past 5am, came out of his room and asked what time breakfast will be served and went back to sleep.  No concerns raised the whole shift.     Intake -0.  Output-0.

## 2021-12-16 NOTE — PLAN OF CARE
"  Pt mumbling on approach and when asked what he said, he stated \"I was talking to someobody else\" although he was in his room alone. When asked how he was feeling today, he stated \"everything is all f---ed up. When can I get out of this f---ing place?\" Appeared reassured when told a discharge/aftercare plan is being worked on. Complied with getting up out of bed and coming to [facility] to eat dinner. Gait unsteady at first when standing. Ate only 20% of solid food on tray, but requested and ate a peanutbutter (\"sunbutter\") and jelly sandwich and a chocolate Ensure. Compliant with taking scheduled medications. Tap bell provided at bedside. Pt encouraged to stand up slowly. Allowed bathroom light to be left on overnight. Declined to allow writer to check/change pullups. Irritable but overall compliant and cooperative. Continue with current treatment plan and recommendations. Continue to monitor and reassess symptoms. Monitor response to medications. Monitor progress towards treatment goals. Encourage groups and participation.   "

## 2021-12-16 NOTE — PLAN OF CARE
Assessment/Intervention/Current Symptoms and Care Coordination:    Current Symptoms include the following: Confusion, delusional thinking, irritability, oriented to only self, and to other, and poor nutrition.    Chart Review    Met with this patient today.    Spoke with this pt's  (at [facility]) this morning. The meeting with the 's office will happen on Friday at 12:00pm        Discharge Plan or Goal:  Pending stabilization & development of a safe discharge plan.  Considerations include: Nursing home. Guardianship process is being pursued.     Barriers to Discharge:  Patient requires further psychiatric stabilization due to current symptomology, Medication management with possible adjustments and guardianship     Referral Status:  No new referrals made.     Legal Status:  Patient is under MI commitment in [facility] with Yanez. [id number]        Contacts:  Case Management Services:  [facility]   Kurtis Chapin. Phone: [phone]  Supervisor is [email or web address]     Freida Machuca  Sister  [phone]

## 2021-12-17 PROCEDURE — 250N000013 HC RX MED GY IP 250 OP 250 PS 637: Performed by: PSYCHIATRY & NEUROLOGY

## 2021-12-17 PROCEDURE — 99233 SBSQ HOSP IP/OBS HIGH 50: CPT | Performed by: PSYCHIATRY & NEUROLOGY

## 2021-12-17 PROCEDURE — 124N000003 HC R&B MH SENIOR/ADOLESCENT

## 2021-12-17 RX ADMIN — GABAPENTIN 100 MG: 100 CAPSULE ORAL at 18:28

## 2021-12-17 RX ADMIN — BUSPIRONE HYDROCHLORIDE 30 MG: 15 TABLET ORAL at 08:34

## 2021-12-17 RX ADMIN — MIRTAZAPINE 15 MG: 15 TABLET, FILM COATED ORAL at 20:25

## 2021-12-17 RX ADMIN — SALMETEROL XINAFOATE 1 PUFF: 50 POWDER, METERED ORAL; RESPIRATORY (INHALATION) at 08:35

## 2021-12-17 RX ADMIN — ATORVASTATIN CALCIUM 80 MG: 80 TABLET, FILM COATED ORAL at 20:26

## 2021-12-17 RX ADMIN — MEGESTROL ACETATE 20 MG: 20 TABLET ORAL at 08:35

## 2021-12-17 RX ADMIN — HALOPERIDOL 1 MG: 1 TABLET ORAL at 20:25

## 2021-12-17 RX ADMIN — CLOZAPINE 200 MG: 100 TABLET ORAL at 20:26

## 2021-12-17 RX ADMIN — BUSPIRONE HYDROCHLORIDE 30 MG: 15 TABLET ORAL at 20:27

## 2021-12-17 RX ADMIN — MEMANTINE 10 MG: 10 TABLET ORAL at 08:34

## 2021-12-17 RX ADMIN — SALMETEROL XINAFOATE 1 PUFF: 50 POWDER, METERED ORAL; RESPIRATORY (INHALATION) at 20:25

## 2021-12-17 RX ADMIN — LEVOTHYROXINE SODIUM 88 MCG: 88 TABLET ORAL at 08:35

## 2021-12-17 RX ADMIN — ASPIRIN 81 MG CHEWABLE TABLET 81 MG: 81 TABLET CHEWABLE at 08:34

## 2021-12-17 RX ADMIN — GABAPENTIN 100 MG: 100 CAPSULE ORAL at 13:05

## 2021-12-17 RX ADMIN — GABAPENTIN 100 MG: 100 CAPSULE ORAL at 08:34

## 2021-12-17 RX ADMIN — MEMANTINE 10 MG: 10 TABLET ORAL at 20:26

## 2021-12-17 ASSESSMENT — ACTIVITIES OF DAILY LIVING (ADL)
HYGIENE/GROOMING: PROMPTS
DRESS: SCRUBS (BEHAVIORAL HEALTH)
ORAL_HYGIENE: PROMPTS
DRESS: SCRUBS (BEHAVIORAL HEALTH);PROMPTS
LAUNDRY: UNABLE TO COMPLETE
HYGIENE/GROOMING: SHOWER;PROMPTS;WITH SUPERVISION
LAUNDRY: UNABLE TO COMPLETE
ORAL_HYGIENE: PROMPTS

## 2021-12-17 NOTE — PLAN OF CARE
Problem: Sleep Disturbance  Goal: Adequate Sleep/Rest  Outcome: No Change     Patient slept for a total of 10.5 hours without any concerns raised the whole shift.    He is scheduled for a blood  draw today to test for  Phosphorus, Magnesium and BMP.

## 2021-12-17 NOTE — PROGRESS NOTES
"[facility], [place]   Psychiatric Progress Note        Interim History:   The patient's care was discussed with the treatment team during the daily team meeting and/or staff's chart notes were reviewed.  Staff report patient is more compliant. Did agree to a shower. Has been out for more meals.  and  are meeting today.     The patient states that he is \"tired.\" Does say \"good morning\" in response to this provider. Reports good sleep. Says that he doesn't feel like eating breakfast. When asked about a supplement, the patient picked it up. Did require help opening it but instructed this provider on how to do so and then drank half of it. Denies SI.          Medications:       aspirin  81 mg Oral Daily     atorvastatin  80 mg Oral At Bedtime     busPIRone  30 mg Oral BID     [Held by provider] cholecalciferol  1,250 mcg Oral Q7 Days     cloZAPine  200 mg Oral At Bedtime     gabapentin  100 mg Oral TID w/meals     haloperidol  1 mg Oral At Bedtime     levothyroxine  88 mcg Oral Daily     megestrol  20 mg Oral Daily     memantine  10 mg Oral BID     [Held by provider] metoprolol tartrate  25 mg Oral BID     mirtazapine  15 mg Oral At Bedtime     salmeterol  1 puff Inhalation BID          Allergies:     Allergies   Allergen Reactions     Ibuprofen      Latex           Labs:     Recent Results (from the past 24 hour(s))   Asymptomatic COVID-19 Virus (Coronavirus) by PCR Nasopharyngeal    Collection Time: 12/16/21  7:04 PM    Specimen: Nasopharyngeal; Swab   Result Value Ref Range    SARS CoV2 PCR Negative Negative          Psychiatric Examination:     /86   Pulse 106   Temp 98  F (36.7  C) (Oral)   Resp 16   Ht 1.829 m (6')   Wt 75.6 kg (166 lb 11.2 oz)   SpO2 98%   BMI 22.61 kg/m    Weight is 166 lbs 11.2 oz  Body mass index is 22.61 kg/m .  Orthostatic Vitals  Report      Most Recent      Lying Orthostatic BP 75/58 11/12 0850    Lying Orthostatic Pulse " "(bpm) 84 11/12 0850            Appearance: awake, alert  Attitude:  more cooperative  Eye Contact:  good  Mood:  \"tired\"  Affect:  intensity is flat, slightly more full  Speech:  mumbling but more clear  Psychomotor Behavior:  no evidence of tardive dyskinesia, dystonia, or tics  Thought Process:  concrete  Associations:  no loose associations  Thought Content:  no evidence of suicidal ideation or homicidal ideation and auditory hallucinations present.   Insight:  limited  Judgement:  limited  Oriented to:  person and place  Attention Span and Concentration:  fair  Recent and Remote Memory:  poor    Clinical Global Impressions  First:  Considering your total clinical experience with this particular patient population, how severe are the patient's symptoms at this time?: 7 (07/01/21 0630)  Compared to the patient's condition at the START of treatment, this patient's condition is: 4 (07/01/21 0630)  Most recent:  Considering your total clinical experience with this particular patient population, how severe are the patient's symptoms at this time?: 7 (11/09/21 1642)  Compared to the patient's condition at the START of treatment, this patient's condition is: 5 (11/09/21 1642)         Precautions:     Behavioral Orders   Procedures     Assault precautions     Cheeking Precautions (behavioral units)     Patient Observed swallowing PO medications; Patient asked to drink water after swallowing medication; Patient in Staff line of sight for 15 minutes after medication given; Mouth checks after PO administration (patient asked to open mouth and stick out their tongue).     Code 1     Code 2     With Security for any imaging/testing needed.     Elopement precautions     Fall precautions     Routine Programming     As clinically indicated     Single Room     Status 15     Every 15 minutes.          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   Stimulant use disorder, in remission.   Transaminitis, possibly medication induced   Hx of CVA  Vit D deficiency  Hypertension  COPD  Hx of infective endocarditis with severe mitral and aortic regurgitation s/p biprostehtic valve replacement 2015  Hx of HFrEF now recovered  Tobacco use disorder  Severe malnutrition in context of acute illness   Urinary incontinence  Constipation   Malaise  Prolonged QTc         Plan:     Assessment and hospital summary:  The patient is a 54yo male with a history of schizophrenia and TBI and multiple medical co-morbidities as above who was admitted after being transferred from [facility] medical [facility] after a nearly year-long stay. Is currently under commitment with Yanez recently amended to include Clozaril. While at [facility] was noted to have ongoing agitation and frequently attempting to elope from unit requiring 1:1 staffing for safety. He was started on 1:1 staff upon transfer, this was discontinued as patient has been more cooperative without elopement attempts. IM consult completed on admission and continued to follow due to elevated LFTs. Haldol and VPA discontinued due to LFTs. Cross titration completed from risperidone to clozaril after Yanez amended. EPSE/TD movements noted, have appeared stable over past few weeks. Patient has continued to have disorganization and active psychosis symptoms which appear to be at patients baseline. Team continues to work on disposition which barriers include patient has no guardian or next of kin willing to act as surrogate decision maker, see [facility] notes for complete details. Patient starting to have increasing symptoms noted week of 9/20, clozapine level ordered and noted to be lower than previous, have added cheeking precautions and increased clozapine dose on 9/24 and moved to split dosing given increasing afternoon agitation. Pt having  decreased appetite, increased fatigue over weekend of 10/9-10/10, IM consult placed 10/11, KUB Xray showed large stool burden, bowel regimen modified. Pt was incontinent of stool evening of 10/13. Consolidated clozapine dose to bedtime given concern for daytime sedation with split dosing. Ongoing malaise noted starting 10/11, internal medicine has been contacted periodically. Noted 18# weight loss in 2 months, IM contacted for possible FTT on 11/4. Ethics consult placed 11/5. Neurology re-consulted 11/8.     12/2 - reached out to CTC lead for update on guardianship process.      Psychiatric treatment/inteventions:  Medications:   -continue clozapine 200mg at bedtime  -continue Haldol 1mg Qday  -continue gabapentin 100mg TID  -continue Remeron 15mg at bedtime.   -continue Buspar 30mg BID.   -continue cheeking precautions, started 9/27 given noted decompensation in recent weeks and lower clozapine level   -continue memantine at 5mg daily for neurocognitive disorder, dose was up to 10mg BID and have been reducing dose, may taper off as patient does not appear to have benefit and reporting oversedation and to reduce anticholinergic load  -continue risperdal 1mg Q6H PRN agitation   -continue lorazepam 0.5-1.0mg Q4H PRN anxiety or severe agitation     -discontinued benztropine 0.25mg BID on 10/21, tapered off to reduce anticholinergic load  -discontinued rivastigmine patch on 10/21,pt did not seemed to have benefit and to reduce anticholinergic load     -Ethics consult placed 11/5 due to patient lacking decision making capacity without guardian or surrogate decision maker and possible need for medical interventions including NJ/NG or PEG tube in future, appreciate assistance, care conference on 11/11    Discussed plan for NG tube with medicine and they are following this closely.   Continue Megace    Encouraged patient to come out for meals and drink the supplements.     Disposition Plan   Reason for ongoing admission: is  unable to care for self due to severe psychosis or mariusz  Discharge location: group home  Discharge Medications: not ordered  Follow-up Appointments: not scheduled  Legal Status: full commitment    Entered by: Wilton Morfin on December 17, 2021 at 12:24 PM

## 2021-12-17 NOTE — PLAN OF CARE
Patient came to the [place] [address] today for dinner and snacks. He ate 2 puddings and a cup of coffee for a snack' At dinner he sat at a table with peers but did not eat only drank tea and milk. His affect is flat. His mood is calm. He has been pleasant. He denies SI and SIB. He denies all MH symptoms. He is oriented to self only. His gait is slow and unsteady at times. He refused a shower. He drank 820 ml for fluids this shift.  He urinated 400 ml of dark yellow urine in hat. Patient was compliant with COVID swab-result negative. He remains withdrawn and isolative, sleeping most of the shift. His speech is difficult to understand due to mumbling. He denies pain. He did not attend groups. He is not social with peers. He is medication compliant. He voices not concerns. He did not appear to be responding to internal stimuli.

## 2021-12-17 NOTE — PLAN OF CARE
"  Problem: Cognitive Impairment (Psychotic Signs/Symptoms)  Goal: Optimal Cognitive Function (Psychotic Signs/Symptoms)  Outcome: Improving  Flowsheets (Taken 12/17/2021 1048)  Mutually Determined Action Steps (Optimal Cognitive Function): follows step-by-step instructions     Patent alert x1, to self. Orientation level fluctuates throughout the day but most of the times he is disoriented about time, place and situation. Denies pain. No s/s of pain.   Patient denies SI, SIB, anxiety, depression or hallucinations. States \"I am tired. I want to sleep\". He was encouraged to come to the dinning area for meals but he refused. After a lot of encouragements patient agreed to take a shower. His scrubs and bed linens were changed as well. No new skin issues /wounds or rush were seen on his visible skin during showering. Patient was continent of urin, output not measured, patient voided in the tolet not in the hat. Patient stated last BM \"Four days ago\" but due to his memory deficit that information might not be accurate. Patient denies feeling constipated. Minimal oral fluid and food intake. Loss of appetite, patient states \"I am not hungry\" or \"I am full\" after couple of bites.   .   "

## 2021-12-17 NOTE — PLAN OF CARE
Assessment/Intervention/Current Symptoms and Care Coordination:    Current Symptoms include the following: Confusion, delusional thinking, irritability, oriented to only self, and to other, and poor nutrition.    Chart Review    Met with this patient today.  Reviewed pt's care in a meeting with pt's  and his supervisor. We discussed ES s case. Writer provided update on  coordination with [facility] Guardianship program as reported initially.  Writer will make a report to the [facility] to see if this can lead to any state support or resources. Before doing so, writer will send a note to the  staff looking through the contract already.     Discharge Plan or Goal:  Pending stabilization & development of a safe discharge plan.  Considerations include: Nursing home. Guardianship process is being pursued.     Barriers to Discharge:  Patient requires further psychiatric stabilization due to current symptomology, Medication management with possible adjustments and guardianship     Referral Status:  No new referrals made.     Legal Status:  Patient is under MI commitment in [facility] with Yanez. [id number]        Contacts:  Case Management Services:  [facility]   Kurtis Chapin. Phone: [phone]  Supervisor is [email or web address]     Freida Talley  [phone]

## 2021-12-18 LAB
BASOPHILS # BLD AUTO: 0.1 10E3/UL (ref 0–0.2)
BASOPHILS NFR BLD AUTO: 1 %
EOSINOPHIL # BLD AUTO: 2.2 10E3/UL (ref 0–0.7)
EOSINOPHIL NFR BLD AUTO: 23 %
IMM GRANULOCYTES # BLD: 0 10E3/UL
IMM GRANULOCYTES NFR BLD: 0 %
LYMPHOCYTES # BLD AUTO: 1.5 10E3/UL (ref 0.8–5.3)
LYMPHOCYTES NFR BLD AUTO: 16 %
MONOCYTES # BLD AUTO: 0.6 10E3/UL (ref 0–1.3)
MONOCYTES NFR BLD AUTO: 7 %
NEUTROPHILS # BLD AUTO: 5.1 10E3/UL (ref 1.6–8.3)
NEUTROPHILS NFR BLD AUTO: 53 %
NRBC # BLD AUTO: 0 10E3/UL
NRBC BLD AUTO-RTO: 0 /100
WBC # BLD AUTO: 9.5 10E3/UL (ref 4–11)

## 2021-12-18 PROCEDURE — 36415 COLL VENOUS BLD VENIPUNCTURE: CPT | Performed by: PSYCHIATRY & NEUROLOGY

## 2021-12-18 PROCEDURE — 85004 AUTOMATED DIFF WBC COUNT: CPT | Performed by: PSYCHIATRY & NEUROLOGY

## 2021-12-18 PROCEDURE — 250N000013 HC RX MED GY IP 250 OP 250 PS 637: Performed by: PSYCHIATRY & NEUROLOGY

## 2021-12-18 PROCEDURE — 124N000003 HC R&B MH SENIOR/ADOLESCENT

## 2021-12-18 RX ADMIN — LEVOTHYROXINE SODIUM 88 MCG: 88 TABLET ORAL at 08:48

## 2021-12-18 RX ADMIN — BUSPIRONE HYDROCHLORIDE 30 MG: 15 TABLET ORAL at 08:48

## 2021-12-18 RX ADMIN — GABAPENTIN 100 MG: 100 CAPSULE ORAL at 12:20

## 2021-12-18 RX ADMIN — MEGESTROL ACETATE 20 MG: 20 TABLET ORAL at 08:48

## 2021-12-18 RX ADMIN — ASPIRIN 81 MG CHEWABLE TABLET 81 MG: 81 TABLET CHEWABLE at 08:48

## 2021-12-18 RX ADMIN — BUSPIRONE HYDROCHLORIDE 30 MG: 15 TABLET ORAL at 20:45

## 2021-12-18 RX ADMIN — GABAPENTIN 100 MG: 100 CAPSULE ORAL at 08:48

## 2021-12-18 RX ADMIN — CLOZAPINE 200 MG: 100 TABLET ORAL at 22:01

## 2021-12-18 RX ADMIN — GABAPENTIN 100 MG: 100 CAPSULE ORAL at 18:00

## 2021-12-18 RX ADMIN — MEMANTINE 10 MG: 10 TABLET ORAL at 08:48

## 2021-12-18 RX ADMIN — HALOPERIDOL 1 MG: 1 TABLET ORAL at 22:02

## 2021-12-18 ASSESSMENT — ACTIVITIES OF DAILY LIVING (ADL)
HYGIENE/GROOMING: PROMPTS
LAUNDRY: UNABLE TO COMPLETE
ORAL_HYGIENE: PROMPTS
DRESS: SCRUBS (BEHAVIORAL HEALTH)

## 2021-12-18 NOTE — PLAN OF CARE
"Pt is confused and disoriented.  Pt asks where he is, how long he needs to stay, and how can he go outside to smoke a cigarette.  Pt states that he would like to leave and that his hold has .   Pt made no attempts to leave the unit.  Pt denies all MH symptoms.  [facility] [place] [facility] with little prompting.      When giving pt his HS medications, he was cooperative with taking Busbar.  Pt spit out Lipitor tablet stating \"that one tastes like shit and it's too big.\"  Pt then declined all other medications.  \"I am not taking anymore - it's too much.  I don't need pills.\"  Pt re-approached one hour later and was agreeable to taking Haldol and Clozaril.  Pt refused Lipitor, Namenda, Remeron and Serevent inhaler.    At 1600 pt's BP 90/65,  - pt lying in bed.  Pt drank water with encouragement.  BP rechecked x 2 and improved by /80, .    Appetite:  40% dinner meal, 1 Ensure, banana, crackers, sunbutter  Intake:  870 mL  "

## 2021-12-18 NOTE — PLAN OF CARE
Patient is alert to self only, has been isolative to his room, came to the lounge for lunch with encouragement, has poor appetite. Denies SI, SIB and hallucination. Denies anxiety and depression. His affect is flat/blunted. His mood is calm. He is not social with staff and peers. Med complaint except his inhaler. Complains of neck pain, refused intervention. Refused to go to group. Total fluid intake was 1190 ml and out put 500 ml of franko color urine. He is on medical bed due to impaired mobility. Continue to monitor patient as plan of care.

## 2021-12-18 NOTE — PLAN OF CARE
"Pt is oriented to self only.  [facility].  Intake is poor - at dinner he ate one applesauce and drank 1/2 milk.  Pt consumed 50% [place] Ambulation is slow, somewhat unsteady.  Total fluid intake: 480 mL.  Pt encouraged to use handrail in hallway.  Pt asked where he was, asked when he could go outside for a cigarette, and asked when would he be going home.  Pt encouraged to wear a pull up at bedtime - pt became quite irritable and stated \"No - I am not a baby.  I don't wear those.\"    Pt continues to need a medical bed due to impaired mobility and dx of COPD.  "

## 2021-12-18 NOTE — PLAN OF CARE
Problem: Sleep Disturbance  Goal: Adequate Sleep/Rest  Outcome: No Change     Patient slept a total of 9 hours without any concerns raised the whole shift.     Total Intake- 0 . Total Output- 0.

## 2021-12-19 LAB
ANION GAP SERPL CALCULATED.3IONS-SCNC: 7 MMOL/L (ref 3–14)
BUN SERPL-MCNC: 17 MG/DL (ref 7–30)
CALCIUM SERPL-MCNC: 9 MG/DL (ref 8.5–10.1)
CHLORIDE BLD-SCNC: 115 MMOL/L (ref 94–109)
CO2 SERPL-SCNC: 21 MMOL/L (ref 20–32)
CREAT SERPL-MCNC: 0.66 MG/DL (ref 0.66–1.25)
GFR SERPL CREATININE-BSD FRML MDRD: >90 ML/MIN/1.73M2
GLUCOSE BLD-MCNC: 96 MG/DL (ref 70–99)
POTASSIUM BLD-SCNC: 3.6 MMOL/L (ref 3.4–5.3)
SODIUM SERPL-SCNC: 143 MMOL/L (ref 133–144)

## 2021-12-19 PROCEDURE — 250N000013 HC RX MED GY IP 250 OP 250 PS 637: Performed by: PSYCHIATRY & NEUROLOGY

## 2021-12-19 PROCEDURE — 36415 COLL VENOUS BLD VENIPUNCTURE: CPT | Performed by: PHYSICIAN ASSISTANT

## 2021-12-19 PROCEDURE — 80048 BASIC METABOLIC PNL TOTAL CA: CPT | Performed by: PHYSICIAN ASSISTANT

## 2021-12-19 PROCEDURE — 124N000003 HC R&B MH SENIOR/ADOLESCENT

## 2021-12-19 RX ADMIN — MEMANTINE 10 MG: 10 TABLET ORAL at 08:40

## 2021-12-19 RX ADMIN — MEGESTROL ACETATE 20 MG: 20 TABLET ORAL at 08:40

## 2021-12-19 RX ADMIN — CLOZAPINE 200 MG: 100 TABLET ORAL at 20:45

## 2021-12-19 RX ADMIN — MIRTAZAPINE 15 MG: 15 TABLET, FILM COATED ORAL at 19:31

## 2021-12-19 RX ADMIN — HALOPERIDOL 1 MG: 1 TABLET ORAL at 20:45

## 2021-12-19 RX ADMIN — BUSPIRONE HYDROCHLORIDE 30 MG: 15 TABLET ORAL at 20:45

## 2021-12-19 RX ADMIN — LEVOTHYROXINE SODIUM 88 MCG: 88 TABLET ORAL at 08:40

## 2021-12-19 RX ADMIN — BUSPIRONE HYDROCHLORIDE 30 MG: 15 TABLET ORAL at 08:40

## 2021-12-19 RX ADMIN — MEMANTINE 10 MG: 10 TABLET ORAL at 19:31

## 2021-12-19 RX ADMIN — ASPIRIN 81 MG CHEWABLE TABLET 81 MG: 81 TABLET CHEWABLE at 08:40

## 2021-12-19 RX ADMIN — SALMETEROL XINAFOATE 1 PUFF: 50 POWDER, METERED ORAL; RESPIRATORY (INHALATION) at 08:34

## 2021-12-19 RX ADMIN — SALMETEROL XINAFOATE 1 PUFF: 50 POWDER, METERED ORAL; RESPIRATORY (INHALATION) at 20:45

## 2021-12-19 RX ADMIN — ATORVASTATIN CALCIUM 80 MG: 80 TABLET, FILM COATED ORAL at 19:31

## 2021-12-19 RX ADMIN — GABAPENTIN 100 MG: 100 CAPSULE ORAL at 18:11

## 2021-12-19 RX ADMIN — GABAPENTIN 100 MG: 100 CAPSULE ORAL at 08:40

## 2021-12-19 RX ADMIN — GABAPENTIN 100 MG: 100 CAPSULE ORAL at 12:49

## 2021-12-19 ASSESSMENT — ACTIVITIES OF DAILY LIVING (ADL)
HYGIENE/GROOMING: PROMPTS
LAUNDRY: UNABLE TO COMPLETE
DRESS: SCRUBS (BEHAVIORAL HEALTH)
HYGIENE/GROOMING: PROMPTS
LAUNDRY: UNABLE TO COMPLETE
ORAL_HYGIENE: TOTAL CARE
DRESS: SCRUBS (BEHAVIORAL HEALTH)
ORAL_HYGIENE: PROMPTS

## 2021-12-19 NOTE — PROGRESS NOTES
"Yelling:  Writer was walking in hallway near [facility]'s room. Pt was heard yelling and swearing. Writer entered Gene's room and asked him why he was yelling, Jose replied \"I was yelling at my sister, don't you know, I can speak to people who aren't here\"  Writer reminded Pt that lunch was being served in the dining area. Pt stopped yelling.   "

## 2021-12-19 NOTE — PLAN OF CARE
"Patient is confused, alert to self only. Continues to be isolative to his room. [facility], walked detention in the [place] and returned back to his room stated, \"I am tired\". Has flat/blunted affect. Denies all mental health symptoms. VSS, denies any pain and discomfort. Speech is somewhat unclear and rambling. Not social with staff and peers. Medication complaint. Didn't attend group. Fluid encouraged, this shift total fluid intake was 840 ml. Out put 150 ml of franko color urine plus noted unmeasured urine in the toilet. Poor appetite, ate 50% of his breakfast and 25% of lunch stated \"I am not hungry\" when asked to eat more. Refused to take shower. He is on medical bed due to impaired mobility. Continue to monitor patient as plan of care  "

## 2021-12-19 NOTE — PLAN OF CARE
Problem: Sleep Disturbance  Goal: Adequate Sleep/Rest  Outcome: No Change      Patient slept a total of 11 hours. No concerns raised the whole shift.     Total Intake - 0. Total Output - 0.

## 2021-12-20 LAB
ANION GAP SERPL CALCULATED.3IONS-SCNC: 7 MMOL/L (ref 3–14)
BUN SERPL-MCNC: 15 MG/DL (ref 7–30)
CALCIUM SERPL-MCNC: 8.7 MG/DL (ref 8.5–10.1)
CHLORIDE BLD-SCNC: 115 MMOL/L (ref 94–109)
CO2 SERPL-SCNC: 22 MMOL/L (ref 20–32)
CREAT SERPL-MCNC: 0.66 MG/DL (ref 0.66–1.25)
GFR SERPL CREATININE-BSD FRML MDRD: >90 ML/MIN/1.73M2
GLUCOSE BLD-MCNC: 98 MG/DL (ref 70–99)
MAGNESIUM SERPL-MCNC: 2.2 MG/DL (ref 1.6–2.3)
PHOSPHATE SERPL-MCNC: 3.6 MG/DL (ref 2.5–4.5)
POTASSIUM BLD-SCNC: 3.6 MMOL/L (ref 3.4–5.3)
SODIUM SERPL-SCNC: 144 MMOL/L (ref 133–144)

## 2021-12-20 PROCEDURE — 250N000013 HC RX MED GY IP 250 OP 250 PS 637: Performed by: PSYCHIATRY & NEUROLOGY

## 2021-12-20 PROCEDURE — 82435 ASSAY OF BLOOD CHLORIDE: CPT | Performed by: PSYCHIATRY & NEUROLOGY

## 2021-12-20 PROCEDURE — 36415 COLL VENOUS BLD VENIPUNCTURE: CPT | Performed by: PSYCHIATRY & NEUROLOGY

## 2021-12-20 PROCEDURE — 99233 SBSQ HOSP IP/OBS HIGH 50: CPT | Performed by: PSYCHIATRY & NEUROLOGY

## 2021-12-20 PROCEDURE — 124N000003 HC R&B MH SENIOR/ADOLESCENT

## 2021-12-20 PROCEDURE — 83735 ASSAY OF MAGNESIUM: CPT | Performed by: PSYCHIATRY & NEUROLOGY

## 2021-12-20 PROCEDURE — 84100 ASSAY OF PHOSPHORUS: CPT | Performed by: PSYCHIATRY & NEUROLOGY

## 2021-12-20 RX ADMIN — LEVOTHYROXINE SODIUM 88 MCG: 88 TABLET ORAL at 08:45

## 2021-12-20 RX ADMIN — SALMETEROL XINAFOATE 1 PUFF: 50 POWDER, METERED ORAL; RESPIRATORY (INHALATION) at 20:23

## 2021-12-20 RX ADMIN — HALOPERIDOL 1 MG: 1 TABLET ORAL at 20:24

## 2021-12-20 RX ADMIN — CLOZAPINE 200 MG: 100 TABLET ORAL at 20:24

## 2021-12-20 RX ADMIN — MEMANTINE 10 MG: 10 TABLET ORAL at 08:45

## 2021-12-20 RX ADMIN — SALMETEROL XINAFOATE 1 PUFF: 50 POWDER, METERED ORAL; RESPIRATORY (INHALATION) at 08:46

## 2021-12-20 RX ADMIN — GABAPENTIN 100 MG: 100 CAPSULE ORAL at 08:46

## 2021-12-20 RX ADMIN — ASPIRIN 81 MG CHEWABLE TABLET 81 MG: 81 TABLET CHEWABLE at 08:45

## 2021-12-20 RX ADMIN — GABAPENTIN 100 MG: 100 CAPSULE ORAL at 13:03

## 2021-12-20 RX ADMIN — ATORVASTATIN CALCIUM 80 MG: 80 TABLET, FILM COATED ORAL at 20:24

## 2021-12-20 RX ADMIN — BUSPIRONE HYDROCHLORIDE 30 MG: 15 TABLET ORAL at 08:45

## 2021-12-20 RX ADMIN — MEGESTROL ACETATE 20 MG: 20 TABLET ORAL at 08:45

## 2021-12-20 RX ADMIN — GABAPENTIN 100 MG: 100 CAPSULE ORAL at 17:45

## 2021-12-20 RX ADMIN — MIRTAZAPINE 15 MG: 15 TABLET, FILM COATED ORAL at 20:24

## 2021-12-20 RX ADMIN — MEMANTINE 10 MG: 10 TABLET ORAL at 20:23

## 2021-12-20 RX ADMIN — BUSPIRONE HYDROCHLORIDE 30 MG: 15 TABLET ORAL at 20:23

## 2021-12-20 NOTE — PROGRESS NOTES
"[facility], [place]   Psychiatric Progress Note        Interim History:   The patient's care was discussed with the treatment team during the daily team meeting and/or staff's chart notes were reviewed.  Staff report patient has been withdrawn but less irritable. Poor appetite. Did report AH.     The patient states that he is \"all right.\" Denies problems with sleep. Breakfast was brought in during this provider's interview, and the patient sat up, opened his fork and began eating his breakfast with a robust appetite. Denies SI. Denies current AH.          Medications:       aspirin  81 mg Oral Daily     atorvastatin  80 mg Oral At Bedtime     busPIRone  30 mg Oral BID     [Held by provider] cholecalciferol  1,250 mcg Oral Q7 Days     cloZAPine  200 mg Oral At Bedtime     gabapentin  100 mg Oral TID w/meals     haloperidol  1 mg Oral At Bedtime     levothyroxine  88 mcg Oral Daily     megestrol  20 mg Oral Daily     memantine  10 mg Oral BID     [Held by provider] metoprolol tartrate  25 mg Oral BID     mirtazapine  15 mg Oral At Bedtime     salmeterol  1 puff Inhalation BID          Allergies:     Allergies   Allergen Reactions     Ibuprofen      Latex           Labs:     Recent Results (from the past 24 hour(s))   Basic metabolic panel    Collection Time: 12/20/21  8:44 AM   Result Value Ref Range    Sodium 144 133 - 144 mmol/L    Potassium 3.6 3.4 - 5.3 mmol/L    Chloride 115 (H) 94 - 109 mmol/L    Carbon Dioxide (CO2) 22 20 - 32 mmol/L    Anion Gap 7 3 - 14 mmol/L    Urea Nitrogen 15 7 - 30 mg/dL    Creatinine 0.66 0.66 - 1.25 mg/dL    Calcium 8.7 8.5 - 10.1 mg/dL    Glucose 98 70 - 99 mg/dL    GFR Estimate >90 >60 mL/min/1.73m2   Magnesium    Collection Time: 12/20/21  8:44 AM   Result Value Ref Range    Magnesium 2.2 1.6 - 2.3 mg/dL   Phosphorus    Collection Time: 12/20/21  8:44 AM   Result Value Ref Range    Phosphorus 3.6 2.5 - 4.5 mg/dL          Psychiatric Examination:     BP " "110/66 (BP Location: Right arm, Patient Position: Sitting)   Pulse 88   Temp 97.2  F (36.2  C) (Temporal)   Resp 16   Ht 1.829 m (6')   Wt 75.6 kg (166 lb 11.2 oz)   SpO2 97%   BMI 22.61 kg/m    Weight is 166 lbs 11.2 oz  Body mass index is 22.61 kg/m .  Orthostatic Vitals  Report      Most Recent      Lying Orthostatic BP 75/58 11/12 0850    Lying Orthostatic Pulse (bpm) 84 11/12 0850            Appearance: awake, alert  Attitude:  more cooperative  Eye Contact:  fair  Mood:  \"all right\"  Affect:  intensity is flat,  more full  Speech:  mumbling but more clear  Psychomotor Behavior:  no evidence of tardive dyskinesia, dystonia, or tics  Thought Process:  concrete  Associations:  no loose associations  Thought Content:  no evidence of suicidal ideation or homicidal ideation and auditory hallucinations present.   Insight:  limited  Judgement:  limited  Oriented to:  person and place  Attention Span and Concentration:  fair  Recent and Remote Memory:  poor    Clinical Global Impressions  First:  Considering your total clinical experience with this particular patient population, how severe are the patient's symptoms at this time?: 7 (07/01/21 0630)  Compared to the patient's condition at the START of treatment, this patient's condition is: 4 (07/01/21 0630)  Most recent:  Considering your total clinical experience with this particular patient population, how severe are the patient's symptoms at this time?: 7 (11/09/21 1642)  Compared to the patient's condition at the START of treatment, this patient's condition is: 5 (11/09/21 1642)         Precautions:     Behavioral Orders   Procedures     Assault precautions     Cheeking Precautions (behavioral units)     Patient Observed swallowing PO medications; Patient asked to drink water after swallowing medication; Patient in Staff line of sight for 15 minutes after medication given; Mouth checks after PO administration (patient asked to open mouth and stick out their " tongue).     Code 1     Code 2     With Security for any imaging/testing needed.     Elopement precautions     Fall precautions     Routine Programming     As clinically indicated     Single Room     Status 15     Every 15 minutes.          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   Stimulant use disorder, in remission.   Transaminitis, possibly medication induced   Hx of CVA  Vit D deficiency  Hypertension  COPD  Hx of infective endocarditis with severe mitral and aortic regurgitation s/p biprostehtic valve replacement 2015  Hx of HFrEF now recovered  Tobacco use disorder  Severe malnutrition in context of acute illness   Urinary incontinence  Constipation   Malaise  Prolonged QTc         Plan:     Assessment and hospital summary:  The patient is a 54yo male with a history of schizophrenia and TBI and multiple medical co-morbidities as above who was admitted after being transferred from [facility] after a nearly year-long stay. Is currently under commitment with Yanez recently amended to include Clozaril. While at [facility] was noted to have ongoing agitation and frequently attempting to elope from unit requiring 1:1 staffing for safety. He was started on 1:1 staff upon transfer, this was discontinued as patient has been more cooperative without elopement attempts. IM consult completed on admission and continued to follow due to elevated LFTs. Haldol and VPA discontinued due to LFTs. Cross titration completed from risperidone to clozaril after Yanez amended. EPSE/TD movements noted, have appeared stable over past few weeks. Patient has continued to have disorganization and active psychosis symptoms which appear to be at patients baseline. Team continues to work on disposition which barriers include patient has no  guardian or next of kin willing to act as surrogate decision maker, see CTC notes for complete details. Patient starting to have increasing symptoms noted week of 9/20, clozapine level ordered and noted to be lower than previous, have added cheeking precautions and increased clozapine dose on 9/24 and moved to split dosing given increasing afternoon agitation. Pt having decreased appetite, increased fatigue over weekend of 10/9-10/10, IM consult placed 10/11, KUB Xray showed large stool burden, bowel regimen modified. Pt was incontinent of stool evening of 10/13. Consolidated clozapine dose to bedtime given concern for daytime sedation with split dosing. Ongoing malaise noted starting 10/11, internal medicine has been contacted periodically. Noted 18# weight loss in 2 months, IM contacted for possible FTT on 11/4. Ethics consult placed 11/5. Neurology re-consulted 11/8.     12/2 - reached out to CTC lead for update on guardianship process.      Psychiatric treatment/inteventions:  Medications:   -continue clozapine 200mg at bedtime  -continue Haldol 1mg Qday  -continue gabapentin 100mg TID  -continue Remeron 15mg at bedtime.   -continue Buspar 30mg BID.   -continue cheeking precautions, started 9/27 given noted decompensation in recent weeks and lower clozapine level   -continue memantine at 5mg daily for neurocognitive disorder, dose was up to 10mg BID and have been reducing dose, may taper off as patient does not appear to have benefit and reporting oversedation and to reduce anticholinergic load  -continue risperdal 1mg Q6H PRN agitation   -continue lorazepam 0.5-1.0mg Q4H PRN anxiety or severe agitation     -discontinued benztropine 0.25mg BID on 10/21, tapered off to reduce anticholinergic load  -discontinued rivastigmine patch on 10/21,pt did not seemed to have benefit and to reduce anticholinergic load     -Ethics consult placed 11/5 due to patient lacking decision making capacity without guardian or  surrogate decision maker and possible need for medical interventions including NJ/NG or PEG tube in future, appreciate assistance, care conference on 11/11    Discussed plan for NG tube with medicine and they are following this closely.   Continue Megace    Encouraged patient to come out for meals and drink the supplements.     Disposition Plan   Reason for ongoing admission: is unable to care for self due to severe psychosis or mariusz  Discharge location: group home  Discharge Medications: not ordered  Follow-up Appointments: not scheduled  Legal Status: full commitment    Entered by: Wilton Morfin on December 20, 2021 at 1:27 PM

## 2021-12-20 NOTE — PLAN OF CARE
"Patient is alert to self only, isolative and withdrawn. Refused to came to the lounge for breakfast, meal brought to his room and ate 50% of his breakfast, came to the lounge for lunch with encouragement and ate 25% of his lunch. His affect is flat/blunted. Denies all mental health symptoms except depression, responded \"Yes\" to depression question but refused for further conversation and unable to explain triggers. Denies any pain and discomfort. He is not social with peers and staff. Medication complaint. Didn't attend group. Fluid encouraged, total fluid intake was 720 ml. Out put 250 ml franko color urine and noted unmeasured urine in the toilet. He is on medical bed due to impaired mobility. Continue to monitor patient as plan of care.  "

## 2021-12-20 NOTE — PROGRESS NOTES
CLINICAL NUTRITION SERVICES - REASSESSMENT NOTE     Nutrition Prescription    RECOMMENDATIONS FOR MDs/PROVIDERS TO ORDER:  Please note continued weight loss, 30 lbs since admit to facility. If it were decided it was deemed appropriate and or pt agreeing to consider nutrition support, please consult RD services for this.     Malnutrition Status:    Severe malnutrition in the context of acute on chronic condition     Recommendations already ordered by Registered Dietitian (RD):  Pt declined additional interventions     Future/Additional Recommendations:  Continue to monitor meal/supplement intakes and update orders as needed  Continue to monitor weight/lab trends as available      EVALUATION OF THE PROGRESS TOWARD GOALS   Diet: mechanical/dental soft, allow write ins: ham/cheese, mandarin oranges   Supplements PRN  Intake: variable, 0-100% of meals per flowsheet        NEW FINDINGS   Pt irritable during visit, limited information provided. Pt stated he is not being sent food and has not received anything to eat today however he is consistently receiving 3 meals/day per health touch.   Ethics consulted (11/16), plan for symptom management rather than involuntary treatment (nutrition support) requiring restraints.    7# (4%) wt loss in 1 month, 30# (15.3%) wt loss in 6 months   12/12/21 75.6 kg (166 lb 11.2 oz)   12/07/21 75.4 kg (166 lb 4.8 oz)   11/23/21 77.6 kg (171 lb 1.6 oz)   11/16/21 78.8 kg (173 lb 11.6 oz)   10/12/21 87.4 kg (192 lb 9.6 oz)   09/28/21 89.3 kg (196 lb 14.4 oz)   08/29/21 89.5 kg (197 lb 4.8 oz)   07/22/21 89.2 kg (196 lb 12.8 oz)   06/30/21 89.1 kg (196 lb 8 oz)-admit   03/31/21 84.2 kg (185 lb 9.6 oz)   08/26/20 70.3 kg (155 lb)   07/11/20 69.9 kg (154 lb)   06/27/20 70.3 kg (155 lb)   02/04/20 67.6 kg (149 lb)   01/20/20 69.2 kg (152 lb 9 oz)   09/03/18 78 kg (172 lb)     MALNUTRITION  % Intake: </=50% for >/= 1 month (severe)  % Weight Loss: > 10% in 6 months (severe)  Subcutaneous Fat Loss:  None observed-visual only  Muscle Loss: Temporal:  mild, Facial & jaw region:  moderate and Posterior calf:  Moderate-visual only   Fluid Accumulation/Edema: None noted  Malnutrition Diagnosis: Severe malnutrition in the context of acute on chronic condition     Previous Goals   Patient to consume % of nutritionally adequate meal trays TID, or the equivalent with supplements/snacks.  Evaluation: Not met    Previous Nutrition Diagnosis  Inadequate oral intake of unclear etiology (menu fatigue, mental healthy) as evidenced by poor intake and severe weight loss   Evaluation: No change    CURRENT NUTRITION DIAGNOSIS  Inadequate oral intake of unclear etiology (menu fatigue, mental healthy) as evidenced by poor intake and severe weight loss    INTERVENTIONS  Implementation  None additionally  Pt declined additional interventions     Goals  Patient to consume % of nutritionally adequate meal trays TID, or the equivalent with supplements/snacks.    Monitoring/Evaluation  Progress toward goals will be monitored and evaluated per protocol.    Sharyn Carnes MS, RD, LDN  Unit Pager [phone]

## 2021-12-20 NOTE — PLAN OF CARE
"Pt states his mood is \"good.\"  Pt smiled a few times during interactions and had good eye contact.  No irritability observed.  Pt had brief conversation with writer - pt states he learned a small amount of [place] and [place] when he was a child.  Pt told writer to \"get me a bottle of Giovanny Benson\" - and then smiled. Pt does not appear to be responding to hallucinations.  Pt remains oriented to self only.  Pt out to the lounge for dinner and snack with prompts.  Appetite is poor - only a few bites of dinner and drank 160 mL Ensure.  Pt reluctant to drink more than small sips when offered fluids - needs strong encouragement.  Pt is wearing a brief and allowed writer to confirm that it is dry.  Pt refused to shower or change scrubs.    Output 325 mL franko urine.    Intake 640 mL (Ensure, juice, water).    Pt needs medical bed due to COPD and mobility issues  "

## 2021-12-21 PROCEDURE — 250N000013 HC RX MED GY IP 250 OP 250 PS 637: Performed by: PSYCHIATRY & NEUROLOGY

## 2021-12-21 PROCEDURE — 124N000003 HC R&B MH SENIOR/ADOLESCENT

## 2021-12-21 PROCEDURE — 99233 SBSQ HOSP IP/OBS HIGH 50: CPT | Performed by: PSYCHIATRY & NEUROLOGY

## 2021-12-21 RX ADMIN — HALOPERIDOL 1 MG: 1 TABLET ORAL at 21:34

## 2021-12-21 RX ADMIN — CLOZAPINE 200 MG: 100 TABLET ORAL at 21:34

## 2021-12-21 RX ADMIN — ATORVASTATIN CALCIUM 80 MG: 80 TABLET, FILM COATED ORAL at 21:33

## 2021-12-21 RX ADMIN — MEMANTINE 10 MG: 10 TABLET ORAL at 08:54

## 2021-12-21 RX ADMIN — GABAPENTIN 100 MG: 100 CAPSULE ORAL at 12:52

## 2021-12-21 RX ADMIN — SALMETEROL XINAFOATE 1 PUFF: 50 POWDER, METERED ORAL; RESPIRATORY (INHALATION) at 21:33

## 2021-12-21 RX ADMIN — LEVOTHYROXINE SODIUM 88 MCG: 88 TABLET ORAL at 08:54

## 2021-12-21 RX ADMIN — GABAPENTIN 100 MG: 100 CAPSULE ORAL at 17:36

## 2021-12-21 RX ADMIN — MEGESTROL ACETATE 20 MG: 20 TABLET ORAL at 08:54

## 2021-12-21 RX ADMIN — BUSPIRONE HYDROCHLORIDE 30 MG: 15 TABLET ORAL at 21:34

## 2021-12-21 RX ADMIN — MEMANTINE 10 MG: 10 TABLET ORAL at 21:34

## 2021-12-21 RX ADMIN — GABAPENTIN 100 MG: 100 CAPSULE ORAL at 08:54

## 2021-12-21 RX ADMIN — MIRTAZAPINE 15 MG: 15 TABLET, FILM COATED ORAL at 21:34

## 2021-12-21 RX ADMIN — BUSPIRONE HYDROCHLORIDE 30 MG: 15 TABLET ORAL at 08:54

## 2021-12-21 RX ADMIN — ASPIRIN 81 MG CHEWABLE TABLET 81 MG: 81 TABLET CHEWABLE at 08:54

## 2021-12-21 ASSESSMENT — ACTIVITIES OF DAILY LIVING (ADL)
LAUNDRY: UNABLE TO COMPLETE
DRESS: SCRUBS (BEHAVIORAL HEALTH)
ORAL_HYGIENE: PROMPTS
DRESS: SCRUBS (BEHAVIORAL HEALTH)
LAUNDRY: UNABLE TO COMPLETE
ORAL_HYGIENE: PROMPTS

## 2021-12-21 NOTE — PLAN OF CARE
Patient very irritable and did not want to talk to this writer.  Admitted he was highly depressed but denied anxiety.  Did not want any body in his room and would not answer any more questions.  Refused to come out of room tonight.  Was wondering if he was going home tomorrow.  No further concerns tonight  P:  Continue same plan of care.

## 2021-12-21 NOTE — PLAN OF CARE
"Patient continues to be confused, he is alert to self only, isolative and withdrawn. Ate 75% of his breakfast, Came to the lounge for lunch ate less than 25% of his meal, drank one ensure and wanted to go back to his room. Patient's door was locked at that time and patient informed to walk with this writer and stay in the milieu at least for one hour, patient refused to walk stated, \"I am tired\" Patient was sat on the chair outside his room till around 1:25 pm. His affect is flat/blunted. Irritable mood at times. He stated he is depressed and declined to answer question about his anxiety, SI, SIB and hallucination. Denies any pain. Total fluid intake was 960 ml. 150 ml output plus voided on the toilet. He refused shower. He is on medical bed due to impaired mobility. Continue to monitor patient as plan of care.  "

## 2021-12-21 NOTE — PLAN OF CARE
Problem: Sleep Disturbance  Goal: Adequate Sleep/Rest  Outcome: No Change     Slept well tonight for 9.5 hours  Independently repositions self in bed  Drank 60 ml of orange juice  Output voided x 1  Os sats 97 % At room Air Pulse 96 bpm

## 2021-12-21 NOTE — PROGRESS NOTES
"[facility], [place]   Psychiatric Progress Note        Interim History:   The patient's care was discussed with the treatment team during the daily team meeting and/or staff's chart notes were reviewed.  Staff report patient ate meals in the lounge twice yesterday. Meeting with county and case management is this Friday on guardianship.     The patient states that he is \"all right.\" Denies problems with sleep. Unclear about appetite. Denies SI. Asks \"when can I get out of here?\" Discussed the guardianship and placement process and patients is more irritated and wants to return to his previous apartment.          Medications:       aspirin  81 mg Oral Daily     atorvastatin  80 mg Oral At Bedtime     busPIRone  30 mg Oral BID     [Held by provider] cholecalciferol  1,250 mcg Oral Q7 Days     cloZAPine  200 mg Oral At Bedtime     gabapentin  100 mg Oral TID w/meals     haloperidol  1 mg Oral At Bedtime     levothyroxine  88 mcg Oral Daily     megestrol  20 mg Oral Daily     memantine  10 mg Oral BID     [Held by provider] metoprolol tartrate  25 mg Oral BID     mirtazapine  15 mg Oral At Bedtime     salmeterol  1 puff Inhalation BID          Allergies:     Allergies   Allergen Reactions     Ibuprofen      Latex           Labs:     No results found for this or any previous visit (from the past 24 hour(s)).       Psychiatric Examination:     /78   Pulse 88   Temp 97.6  F (36.4  C) (Oral)   Resp 16   Ht 1.829 m (6')   Wt 75.6 kg (166 lb 11.2 oz)   SpO2 94%   BMI 22.61 kg/m    Weight is 166 lbs 11.2 oz  Body mass index is 22.61 kg/m .  Orthostatic Vitals  Report      Most Recent      Lying Orthostatic BP 75/58 11/12 0850    Lying Orthostatic Pulse (bpm) 84 11/12 0850            Appearance: awake, alert  Attitude:  somewhat cooperative  Eye Contact:  fair  Mood:  \"all right\"  Affect:  intensity is flat  Speech:  mumbling but more clear  Psychomotor Behavior:  no evidence of tardive " dyskinesia, dystonia, or tics  Thought Process:  concrete  Associations:  no loose associations  Thought Content:  no evidence of suicidal ideation or homicidal ideation and auditory hallucinations present.   Insight:  limited  Judgement:  limited  Oriented to:  person and place  Attention Span and Concentration:  fair  Recent and Remote Memory:  poor    Clinical Global Impressions  First:  Considering your total clinical experience with this particular patient population, how severe are the patient's symptoms at this time?: 7 (07/01/21 0630)  Compared to the patient's condition at the START of treatment, this patient's condition is: 4 (07/01/21 0630)  Most recent:  Considering your total clinical experience with this particular patient population, how severe are the patient's symptoms at this time?: 7 (11/09/21 1642)  Compared to the patient's condition at the START of treatment, this patient's condition is: 5 (11/09/21 1642)         Precautions:     Behavioral Orders   Procedures     Assault precautions     Cheeking Precautions (behavioral units)     Patient Observed swallowing PO medications; Patient asked to drink water after swallowing medication; Patient in Staff line of sight for 15 minutes after medication given; Mouth checks after PO administration (patient asked to open mouth and stick out their tongue).     Code 1     Code 2     With Security for any imaging/testing needed.     Elopement precautions     Fall precautions     Routine Programming     As clinically indicated     Single Room     Status 15     Every 15 minutes.          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   Stimulant use disorder, in remission.   Transaminitis, possibly medication induced   Hx of CVA  Vit D deficiency  Hypertension  COPD  Hx of infective  endocarditis with severe mitral and aortic regurgitation s/p biprostehtic valve replacement 2015  Hx of HFrEF now recovered  Tobacco use disorder  Severe malnutrition in context of acute illness   Urinary incontinence  Constipation   Malaise  Prolonged QTc         Plan:     Assessment and hospital summary:  The patient is a 54yo male with a history of schizophrenia and TBI and multiple medical co-morbidities as above who was admitted after being transferred from [facility] after a nearly year-long stay. Is currently under commitment with Yanez recently amended to include Clozaril. While at [facility] was noted to have ongoing agitation and frequently attempting to elope from unit requiring 1:1 staffing for safety. He was started on 1:1 staff upon transfer, this was discontinued as patient has been more cooperative without elopement attempts. IM consult completed on admission and continued to follow due to elevated LFTs. Haldol and VPA discontinued due to LFTs. Cross titration completed from risperidone to clozaril after Yanez amended. EPSE/TD movements noted, have appeared stable over past few weeks. Patient has continued to have disorganization and active psychosis symptoms which appear to be at patients baseline. Team continues to work on disposition which barriers include patient has no guardian or next of kin willing to act as surrogate decision maker, see CTC notes for complete details. Patient starting to have increasing symptoms noted week of 9/20, clozapine level ordered and noted to be lower than previous, have added cheeking precautions and increased clozapine dose on 9/24 and moved to split dosing given increasing afternoon agitation. Pt having decreased appetite, increased fatigue over weekend of 10/9-10/10, IM consult placed 10/11, KUB Xray showed large stool burden, bowel regimen modified. Pt was incontinent of stool evening of 10/13. Consolidated clozapine dose to bedtime given concern for  daytime sedation with split dosing. Ongoing malaise noted starting 10/11, internal medicine has been contacted periodically. Noted 18# weight loss in 2 months, IM contacted for possible FTT on 11/4. Ethics consult placed 11/5. Neurology re-consulted 11/8.     12/2 - reached out to CTC lead for update on guardianship process.      Psychiatric treatment/inteventions:  Medications:   -continue clozapine 200mg at bedtime  -continue Haldol 1mg Qday  -continue gabapentin 100mg TID  -continue Remeron 15mg at bedtime.   -continue Buspar 30mg BID.   -continue cheeking precautions, started 9/27 given noted decompensation in recent weeks and lower clozapine level   -continue memantine at 5mg daily for neurocognitive disorder, dose was up to 10mg BID and have been reducing dose, may taper off as patient does not appear to have benefit and reporting oversedation and to reduce anticholinergic load  -continue risperdal 1mg Q6H PRN agitation   -continue lorazepam 0.5-1.0mg Q4H PRN anxiety or severe agitation     -discontinued benztropine 0.25mg BID on 10/21, tapered off to reduce anticholinergic load  -discontinued rivastigmine patch on 10/21,pt did not seemed to have benefit and to reduce anticholinergic load     -Ethics consult placed 11/5 due to patient lacking decision making capacity without guardian or surrogate decision maker and possible need for medical interventions including NJ/NG or PEG tube in future, appreciate assistance, care conference on 11/11    Discussed plan for NG tube with medicine and they are following this closely.   Continue Megace    Encouraged patient to come out for meals and drink the supplements.     Disposition Plan   Reason for ongoing admission: is unable to care for self due to severe psychosis or mariusz  Discharge location: group home  Discharge Medications: not ordered  Follow-up Appointments: not scheduled  Legal Status: full commitment    Entered by: Wilton Morfin on December 21, 2021 at  12:14 PM

## 2021-12-21 NOTE — PLAN OF CARE
CTC: Missed a return-call from the regional ombudsperson. Returned his call and left him a message. Requested him to leave a message for this writer regarding a particular time he could be reached for a discussion regarding this patient.

## 2021-12-21 NOTE — PLAN OF CARE
BEHAVIORAL TEAM DISCUSSION     Participants: Wilton Morfin MD; Joi Stearns OT; COLE[facility];Gaviota Acosta RN     Progress: Pt was transferred from [facility]. Pt is being engaged in the treatment process. He is observed to be a bit more responsive to prompting, in terms of eating his meal and bathing, minimally. Pt has continued to display symptoms of confusion, hallucination. His food intake and hygiene is also a concern.   Medical work- up has not yielded an origin of his malaise.     Anticipated length of stay:   Unknown at this time. A report has been made with the [facility]'s office seeking guidance on way forward regarding this patient's need to have a guardian to make his healthcare decisions.     Continued Stay Criteria/Rationale:   The pt is not able to care for himself. He is on a mental health commitment and p[place]man. It has been difficult to secure a guardian for him        Medical/Physical: See medical for details  Needs 2 staff to transfer  Staff need to feed the pt     Precautions:           Behavioral Orders   Procedures     Assault precautions     Cheeking Precautions (behavioral units)       Patient Observed swallowing PO medications; Patient asked to drink water after swallowing medication; Patient in Staff line of sight for 15 minutes after medication given; Mouth checks after PO administration (patient asked to open mouth and stick out their tongue).     Code 1     Code 2       With Security for any imaging/testing needed.     Elopement precautions     Fall precautions     Routine Programming       As clinically indicated     Single Room     Status 15       Every 15 minutes.      Plan:   Help pt eat  Medication Management  Ethics consultations as needed  Medications:       Medications:        aspirin  81 mg Oral Daily     atorvastatin  80 mg Oral At Bedtime     busPIRone  30 mg Oral BID     [Held by provider] cholecalciferol  1,250 mcg Oral Q7 Days     cloZAPine  200 mg Oral At Bedtime      gabapentin  100 mg Oral TID w/meals     haloperidol  1 mg Oral At Bedtime     levothyroxine  88 mcg Oral Daily     megestrol  20 mg Oral Daily     memantine  10 mg Oral BID     [Held by provider] metoprolol tartrate  25 mg Oral BID     mirtazapine  15 mg Oral At Bedtime     salmeterol  1 puff Inhalation BID             Pt will be encouraged to engaged in the therapeutic milieu  Pt will be encouraged to attend groups to learn and practice positive skills to cope with his symptoms  Medical consultations will be implemented as needed  Care coordination will be implemented.

## 2021-12-21 NOTE — PLAN OF CARE
Assessment/Intervention/Current Symptoms and Care Coordination:    Current Symptoms include the following: Confusion, delusional thinking, irritability, oriented to only self, and to other, and poor nutrition.    Chart Review    Complex care review completed today with the doctors and other team members.    Left a message with [place] Barry's office (Asif: [phone]) regarding this patient's care and the obstacles that have been encountered with his placement. Requested a call-back.    Met with this patient today.    Meeting with the [facility] Attorney's Office, the  and his supervisor and this writer to clarify and problem-solve the placement challenges with this patient has been re-scheduled for Thursday, December 30, 2021 from 11:00am to 12:00noon.     Discharge Plan or Goal:  Pending stabilization & development of a safe discharge plan.  Considerations include: Nursing home. Guardianship process is being pursued.     Barriers to Discharge:  Patient requires further psychiatric stabilization due to current symptomology, Medication management with possible adjustments and guardianship     Referral Status:  No new referrals made.     Legal Status:  Patient is under MI commitment in [facility] with Yanez. [id number]        Contacts:  Case Management Services:  [facility]   Kurtis Chapin. Phone: [phone]  Supervisor is [email or web address]     Freida Talley  [phone]

## 2021-12-22 PROCEDURE — 250N000013 HC RX MED GY IP 250 OP 250 PS 637: Performed by: PSYCHIATRY & NEUROLOGY

## 2021-12-22 PROCEDURE — 99233 SBSQ HOSP IP/OBS HIGH 50: CPT | Performed by: PSYCHIATRY & NEUROLOGY

## 2021-12-22 PROCEDURE — 124N000003 HC R&B MH SENIOR/ADOLESCENT

## 2021-12-22 RX ADMIN — GABAPENTIN 100 MG: 100 CAPSULE ORAL at 17:38

## 2021-12-22 RX ADMIN — SALMETEROL XINAFOATE 1 PUFF: 50 POWDER, METERED ORAL; RESPIRATORY (INHALATION) at 20:39

## 2021-12-22 RX ADMIN — ASPIRIN 81 MG CHEWABLE TABLET 81 MG: 81 TABLET CHEWABLE at 08:43

## 2021-12-22 RX ADMIN — CLOZAPINE 200 MG: 100 TABLET ORAL at 20:39

## 2021-12-22 RX ADMIN — HALOPERIDOL 1 MG: 1 TABLET ORAL at 20:39

## 2021-12-22 RX ADMIN — BUSPIRONE HYDROCHLORIDE 30 MG: 15 TABLET ORAL at 08:43

## 2021-12-22 RX ADMIN — MIRTAZAPINE 15 MG: 15 TABLET, FILM COATED ORAL at 20:39

## 2021-12-22 RX ADMIN — GABAPENTIN 100 MG: 100 CAPSULE ORAL at 08:43

## 2021-12-22 RX ADMIN — ATORVASTATIN CALCIUM 80 MG: 80 TABLET, FILM COATED ORAL at 20:39

## 2021-12-22 RX ADMIN — GABAPENTIN 100 MG: 100 CAPSULE ORAL at 12:28

## 2021-12-22 RX ADMIN — LEVOTHYROXINE SODIUM 88 MCG: 88 TABLET ORAL at 08:43

## 2021-12-22 RX ADMIN — MEMANTINE 10 MG: 10 TABLET ORAL at 08:43

## 2021-12-22 RX ADMIN — BUSPIRONE HYDROCHLORIDE 30 MG: 15 TABLET ORAL at 20:39

## 2021-12-22 RX ADMIN — SALMETEROL XINAFOATE 1 PUFF: 50 POWDER, METERED ORAL; RESPIRATORY (INHALATION) at 08:44

## 2021-12-22 RX ADMIN — MEMANTINE 10 MG: 10 TABLET ORAL at 20:39

## 2021-12-22 RX ADMIN — MEGESTROL ACETATE 20 MG: 20 TABLET ORAL at 08:43

## 2021-12-22 ASSESSMENT — ACTIVITIES OF DAILY LIVING (ADL)
HYGIENE/GROOMING: WITH ASSISTANCE
LAUNDRY: UNABLE TO COMPLETE
HYGIENE/GROOMING: WITH ASSISTANCE
ORAL_HYGIENE: PROMPTS;WITH ASSISTANCE
LAUNDRY: UNABLE TO COMPLETE
DRESS: SCRUBS (BEHAVIORAL HEALTH);WITH ASSISTANCE
DRESS: SCRUBS (BEHAVIORAL HEALTH)
ORAL_HYGIENE: PROMPTS;WITH ASSISTANCE

## 2021-12-22 NOTE — PLAN OF CARE
"Flat affect, mood is calm. Pt appears responding, while this writer in pt's room pt looked to the corner of the room and verbalized \"yes you can spend my food stamps\", pt verbalized he \"told her she could spend my food stamps\". Pt declines to get out of his bed at this time. Pt smells of urine, pt declines to shower, pt declines to have this staff change his brief at this time, bedding doesn't appear soiled. No verbal outburst this shift, pt appreciative of staff assistance. Gait slow and steady.     I: 500 mL  O: pt declines to measure urine, pt verbalized he is urinating     Breakfast: Pt ate 75% of [place] toast, staff assist to feed.   Lunch: [facility] to [facility] and ate 75%  "

## 2021-12-22 NOTE — PROGRESS NOTES
"[facility], [place]   Psychiatric Progress Note        Interim History:   The patient's care was discussed with the treatment team during the daily team meeting and/or staff's chart notes were reviewed.  Staff report patient does like eggs but doesn't eat much else of his breakfast.     The patient states that he is \"all right.\" Is tired today. Did eat his eggs. Denies SI. Doesn't ask about leaving today.          Medications:       aspirin  81 mg Oral Daily     atorvastatin  80 mg Oral At Bedtime     busPIRone  30 mg Oral BID     [Held by provider] cholecalciferol  1,250 mcg Oral Q7 Days     cloZAPine  200 mg Oral At Bedtime     gabapentin  100 mg Oral TID w/meals     haloperidol  1 mg Oral At Bedtime     levothyroxine  88 mcg Oral Daily     megestrol  20 mg Oral Daily     memantine  10 mg Oral BID     [Held by provider] metoprolol tartrate  25 mg Oral BID     mirtazapine  15 mg Oral At Bedtime     salmeterol  1 puff Inhalation BID          Allergies:     Allergies   Allergen Reactions     Ibuprofen      Latex           Labs:     No results found for this or any previous visit (from the past 24 hour(s)).       Psychiatric Examination:     BP 99/70   Pulse 98   Temp 97.4  F (36.3  C) (Temporal)   Resp 16   Ht 1.829 m (6')   Wt 75.6 kg (166 lb 11.2 oz)   SpO2 98%   BMI 22.61 kg/m    Weight is 166 lbs 11.2 oz  Body mass index is 22.61 kg/m .  Orthostatic Vitals  Report      Most Recent      Lying Orthostatic BP 75/58 11/12 0850    Lying Orthostatic Pulse (bpm) 84 11/12 0850            Appearance: fatigued  Attitude:  somewhat cooperative  Eye Contact:  poor   Mood:  \"all right\"  Affect:  intensity is flat  Speech:  mumbling   Psychomotor Behavior:  no evidence of tardive dyskinesia, dystonia, or tics  Thought Process:  concrete  Associations:  no loose associations  Thought Content:  no evidence of suicidal ideation or homicidal ideation and no evidence of psychotic thought. "   Insight:  limited  Judgement:  limited  Oriented to:  person and place  Attention Span and Concentration:  fair  Recent and Remote Memory:  poor    Clinical Global Impressions  First:  Considering your total clinical experience with this particular patient population, how severe are the patient's symptoms at this time?: 7 (07/01/21 0630)  Compared to the patient's condition at the START of treatment, this patient's condition is: 4 (07/01/21 0630)  Most recent:  Considering your total clinical experience with this particular patient population, how severe are the patient's symptoms at this time?: 7 (11/09/21 1642)  Compared to the patient's condition at the START of treatment, this patient's condition is: 5 (11/09/21 1642)         Precautions:     Behavioral Orders   Procedures     Assault precautions     Cheeking Precautions (behavioral units)     Patient Observed swallowing PO medications; Patient asked to drink water after swallowing medication; Patient in Staff line of sight for 15 minutes after medication given; Mouth checks after PO administration (patient asked to open mouth and stick out their tongue).     Code 1     Code 2     With Security for any imaging/testing needed.     Elopement precautions     Fall precautions     Routine Programming     As clinically indicated     Single Room     Status 15     Every 15 minutes.          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   Stimulant use disorder, in remission.   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 Is currently under commitment with Yanez recently amended to include Clozaril. While at [facility] was noted to have ongoing agitation and frequently attempting to elope from unit requiring 1:1 staffing for safety. He was started on 1:1 staff upon transfer, this was discontinued as patient has been more cooperative without elopement attempts. IM consult completed on admission and continued to follow due to elevated LFTs. Haldol and VPA discontinued due to LFTs. Cross titration completed from risperidone to clozaril after Yanez amended. EPSE/TD movements noted, have appeared stable over past few weeks. Patient has continued to have disorganization and active psychosis symptoms which appear to be at patients baseline. Team continues to work on disposition which barriers include patient has no guardian or next of kin willing to act as surrogate decision maker, see CTC notes for complete details. Patient starting to have increasing symptoms noted week of 9/20, clozapine level ordered and noted to be lower than previous, have added cheeking precautions and increased clozapine dose on 9/24 and moved to split dosing given increasing afternoon agitation. Pt having decreased appetite, increased fatigue over weekend of 10/9-10/10, IM consult placed 10/11, KUB Xray showed large stool burden, bowel regimen modified. Pt was incontinent of stool evening of 10/13. Consolidated clozapine dose to bedtime given concern for daytime sedation with split dosing. Ongoing malaise noted starting 10/11, internal medicine has been contacted periodically. Noted 18# weight loss in 2 months, IM contacted for possible FTT on 11/4.  Ethics consult placed 11/5. Neurology re-consulted 11/8.     12/2 - reached out to CTC lead for update on guardianship process.      Psychiatric treatment/inteventions:  Medications:   -continue clozapine 200mg at bedtime  -continue Haldol 1mg Qday  -continue gabapentin 100mg TID  -continue Remeron 15mg at bedtime.   -continue Buspar 30mg BID.   -continue cheeking precautions, started 9/27 given noted decompensation in recent weeks and lower clozapine level   -continue memantine at 5mg daily for neurocognitive disorder, dose was up to 10mg BID and have been reducing dose, may taper off as patient does not appear to have benefit and reporting oversedation and to reduce anticholinergic load  -continue risperdal 1mg Q6H PRN agitation   -continue lorazepam 0.5-1.0mg Q4H PRN anxiety or severe agitation     -discontinued benztropine 0.25mg BID on 10/21, tapered off to reduce anticholinergic load  -discontinued rivastigmine patch on 10/21,pt did not seemed to have benefit and to reduce anticholinergic load     -Ethics consult placed 11/5 due to patient lacking decision making capacity without guardian or surrogate decision maker and possible need for medical interventions including NJ/NG or PEG tube in future, appreciate assistance, care conference on 11/11    Discussed plan for NG tube with medicine and they are following this closely.   Continue Megace    Encouraged patient to come out for meals and drink the supplements. Ordered double egg portions with breakfast.    Disposition Plan   Reason for ongoing admission: is unable to care for self due to severe psychosis or mariusz  Discharge location: group home  Discharge Medications: not ordered  Follow-up Appointments: not scheduled  Legal Status: full commitment    Entered by: Wilton Morfin on December 22, 2021 at 11:00 AM

## 2021-12-22 NOTE — PLAN OF CARE
Problem: Sleep Disturbance  Goal: Adequate Sleep/Rest  Outcome: No Change     Slept well for 9 hours  No intake or output this shift  Os sats 98 % at room air; P 100 bpm  No concerns this shift

## 2021-12-22 NOTE — PLAN OF CARE
Assessment/Intervention/Current Symptoms and Care Coordination:    Current Symptoms include the following: Confusion, delusional thinking, irritability, oriented to only self, and to other, and poor nutrition.    Chart Review    Met with pt.    Meeting with the [facility] Attorney's Office, the  and his supervisor and this writer to clarify and problem-solve the placement challenges with this patient has been re-scheduled for Thursday, December 30, 2021 from 11:00am to 12:00noon.     Discharge Plan or Goal:  Pending stabilization & development of a safe discharge plan.  Considerations include: Nursing home. Guardianship process is being pursued.     Barriers to Discharge:  Patient requires further psychiatric stabilization due to current symptomology, Medication management with possible adjustments and guardianship     Referral Status:  No new referrals made.     Legal Status:  Patient is under MI commitment in [facility] with Yanez. [id number]        Contacts:  Case Management Services:  [facility]   Kurtis Chapin. Phone: [phone]  Supervisor is [email or web address]     Freida Machuca  Sister  [phone]

## 2021-12-22 NOTE — PLAN OF CARE
Patient continues to be isolative but did come out for dinner tonight.  Ate approx 25%.  Rates depression as high and wants to go home.  Needs much encouragement to eat and drink. BP 90's systolic and 's.  Refuses ADL's.  Med compliant.  P:  continue same plan of care.

## 2021-12-23 LAB
ANION GAP SERPL CALCULATED.3IONS-SCNC: 9 MMOL/L (ref 3–14)
BASOPHILS # BLD MANUAL: 0.2 10E3/UL (ref 0–0.2)
BASOPHILS NFR BLD MANUAL: 2 %
BUN SERPL-MCNC: 15 MG/DL (ref 7–30)
BURR CELLS BLD QL SMEAR: SLIGHT
CALCIUM SERPL-MCNC: 9 MG/DL (ref 8.5–10.1)
CHLORIDE BLD-SCNC: 113 MMOL/L (ref 94–109)
CO2 SERPL-SCNC: 21 MMOL/L (ref 20–32)
CREAT SERPL-MCNC: 0.73 MG/DL (ref 0.66–1.25)
EOSINOPHIL # BLD MANUAL: 2.3 10E3/UL (ref 0–0.7)
EOSINOPHIL NFR BLD MANUAL: 25 %
ERYTHROCYTE [DISTWIDTH] IN BLOOD BY AUTOMATED COUNT: 15.6 % (ref 10–15)
GFR SERPL CREATININE-BSD FRML MDRD: >90 ML/MIN/1.73M2
GLUCOSE BLD-MCNC: 132 MG/DL (ref 70–99)
HCT VFR BLD AUTO: 40.1 % (ref 40–53)
HGB BLD-MCNC: 13.9 G/DL (ref 13.3–17.7)
LYMPHOCYTES # BLD MANUAL: 1.4 10E3/UL (ref 0.8–5.3)
LYMPHOCYTES NFR BLD MANUAL: 15 %
MAGNESIUM SERPL-MCNC: 2 MG/DL (ref 1.6–2.3)
MCH RBC QN AUTO: 30.7 PG (ref 26.5–33)
MCHC RBC AUTO-ENTMCNC: 34.7 G/DL (ref 31.5–36.5)
MCV RBC AUTO: 89 FL (ref 78–100)
MONOCYTES # BLD MANUAL: 0.5 10E3/UL (ref 0–1.3)
MONOCYTES NFR BLD MANUAL: 6 %
NEUTROPHILS # BLD MANUAL: 4.7 10E3/UL (ref 1.6–8.3)
NEUTROPHILS NFR BLD MANUAL: 52 %
PHOSPHATE SERPL-MCNC: 3.4 MG/DL (ref 2.5–4.5)
PLAT MORPH BLD: ABNORMAL
PLATELET # BLD AUTO: 220 10E3/UL (ref 150–450)
POTASSIUM BLD-SCNC: 3.8 MMOL/L (ref 3.4–5.3)
RBC # BLD AUTO: 4.53 10E6/UL (ref 4.4–5.9)
RBC MORPH BLD: ABNORMAL
SODIUM SERPL-SCNC: 143 MMOL/L (ref 133–144)
WBC # BLD AUTO: 9 10E3/UL (ref 4–11)

## 2021-12-23 PROCEDURE — 36415 COLL VENOUS BLD VENIPUNCTURE: CPT | Performed by: PSYCHIATRY & NEUROLOGY

## 2021-12-23 PROCEDURE — 250N000013 HC RX MED GY IP 250 OP 250 PS 637: Performed by: PSYCHIATRY & NEUROLOGY

## 2021-12-23 PROCEDURE — 99233 SBSQ HOSP IP/OBS HIGH 50: CPT | Performed by: PSYCHIATRY & NEUROLOGY

## 2021-12-23 PROCEDURE — 84100 ASSAY OF PHOSPHORUS: CPT | Performed by: PSYCHIATRY & NEUROLOGY

## 2021-12-23 PROCEDURE — 124N000003 HC R&B MH SENIOR/ADOLESCENT

## 2021-12-23 PROCEDURE — 85027 COMPLETE CBC AUTOMATED: CPT | Performed by: PSYCHIATRY & NEUROLOGY

## 2021-12-23 PROCEDURE — 83735 ASSAY OF MAGNESIUM: CPT | Performed by: PSYCHIATRY & NEUROLOGY

## 2021-12-23 PROCEDURE — 80048 BASIC METABOLIC PNL TOTAL CA: CPT | Performed by: PSYCHIATRY & NEUROLOGY

## 2021-12-23 RX ADMIN — GABAPENTIN 100 MG: 100 CAPSULE ORAL at 17:16

## 2021-12-23 RX ADMIN — HALOPERIDOL 1 MG: 1 TABLET ORAL at 20:26

## 2021-12-23 RX ADMIN — BUSPIRONE HYDROCHLORIDE 30 MG: 15 TABLET ORAL at 20:26

## 2021-12-23 RX ADMIN — MEMANTINE 10 MG: 10 TABLET ORAL at 20:26

## 2021-12-23 RX ADMIN — SALMETEROL XINAFOATE 1 PUFF: 50 POWDER, METERED ORAL; RESPIRATORY (INHALATION) at 08:30

## 2021-12-23 RX ADMIN — ASPIRIN 81 MG CHEWABLE TABLET 81 MG: 81 TABLET CHEWABLE at 08:30

## 2021-12-23 RX ADMIN — MEGESTROL ACETATE 20 MG: 20 TABLET ORAL at 08:30

## 2021-12-23 RX ADMIN — GABAPENTIN 100 MG: 100 CAPSULE ORAL at 11:26

## 2021-12-23 RX ADMIN — ATORVASTATIN CALCIUM 80 MG: 80 TABLET, FILM COATED ORAL at 20:26

## 2021-12-23 RX ADMIN — LEVOTHYROXINE SODIUM 88 MCG: 88 TABLET ORAL at 08:30

## 2021-12-23 RX ADMIN — MEMANTINE 10 MG: 10 TABLET ORAL at 08:30

## 2021-12-23 RX ADMIN — BUSPIRONE HYDROCHLORIDE 30 MG: 15 TABLET ORAL at 08:30

## 2021-12-23 RX ADMIN — GABAPENTIN 100 MG: 100 CAPSULE ORAL at 08:30

## 2021-12-23 RX ADMIN — CLOZAPINE 200 MG: 100 TABLET ORAL at 20:25

## 2021-12-23 RX ADMIN — MIRTAZAPINE 15 MG: 15 TABLET, FILM COATED ORAL at 20:26

## 2021-12-23 RX ADMIN — SALMETEROL XINAFOATE 1 PUFF: 50 POWDER, METERED ORAL; RESPIRATORY (INHALATION) at 20:27

## 2021-12-23 ASSESSMENT — ACTIVITIES OF DAILY LIVING (ADL)
LAUNDRY: UNABLE TO COMPLETE
ORAL_HYGIENE: PROMPTS;WITH ASSISTANCE
DRESS: SCRUBS (BEHAVIORAL HEALTH);INDEPENDENT
DRESS: SCRUBS (BEHAVIORAL HEALTH)
LAUNDRY: UNABLE TO COMPLETE
ORAL_HYGIENE: PROMPTS;INDEPENDENT
HYGIENE/GROOMING: PROMPTS;WITH ASSISTANCE
HYGIENE/GROOMING: PROMPTS;INDEPENDENT

## 2021-12-23 NOTE — PLAN OF CARE
Problem: Sleep Disturbance  Goal: Adequate Sleep/Rest  Outcome: Improving     Problem: Fall Injury Risk  Goal: Absence of Fall and Fall-Related Injury  Outcome: No Change      Incontinent of large amount of urine at 0300, pt had to be woken up to go to the toilet to change.  Declined to wear pull ups.Offered fluids but declined to drink.  Gait unsteady, almost lost balance while going back to bed, staff had to hold him.  O2 sats  97 % at room air P 95 bpm  Slept for 11 hours

## 2021-12-23 NOTE — PROGRESS NOTES
"[facility], [place]   Psychiatric Progress Note        Interim History:   The patient's care was discussed with the treatment team during the daily team meeting and/or staff's chart notes were reviewed.  Staff report patient is denying SI or SIB. Denies any AH today. Was incontinent last night.     The patient states that he is \"all right.\" Agreeable to get vitals checked and go out for breakfast. Denies SI.          Medications:       aspirin  81 mg Oral Daily     atorvastatin  80 mg Oral At Bedtime     busPIRone  30 mg Oral BID     [Held by provider] cholecalciferol  1,250 mcg Oral Q7 Days     cloZAPine  200 mg Oral At Bedtime     gabapentin  100 mg Oral TID w/meals     haloperidol  1 mg Oral At Bedtime     levothyroxine  88 mcg Oral Daily     megestrol  20 mg Oral Daily     memantine  10 mg Oral BID     [Held by provider] metoprolol tartrate  25 mg Oral BID     mirtazapine  15 mg Oral At Bedtime     salmeterol  1 puff Inhalation BID          Allergies:     Allergies   Allergen Reactions     Ibuprofen      Latex           Labs:     No results found for this or any previous visit (from the past 24 hour(s)).       Psychiatric Examination:     /82   Pulse 95   Temp 97.6  F (36.4  C) (Oral)   Resp 16   Ht 1.829 m (6')   Wt 75.6 kg (166 lb 11.2 oz)   SpO2 97%   BMI 22.61 kg/m    Weight is 166 lbs 11.2 oz  Body mass index is 22.61 kg/m .  Orthostatic Vitals  Report      Most Recent      Lying Orthostatic BP 75/58 11/12 0850    Lying Orthostatic Pulse (bpm) 84 11/12 0850            Appearance: fatigued  Attitude:  somewhat cooperative  Eye Contact:  poor   Mood:  \"all right\"  Affect:  intensity is flat  Speech:  mumbling   Psychomotor Behavior:  no evidence of tardive dyskinesia, dystonia, or tics  Thought Process:  concrete  Associations:  no loose associations  Thought Content:  no evidence of suicidal ideation or homicidal ideation and no evidence of psychotic thought. "   Insight:  limited  Judgement:  limited  Oriented to:  person and place  Attention Span and Concentration:  fair  Recent and Remote Memory:  poor    Clinical Global Impressions  First:  Considering your total clinical experience with this particular patient population, how severe are the patient's symptoms at this time?: 7 (07/01/21 0630)  Compared to the patient's condition at the START of treatment, this patient's condition is: 4 (07/01/21 0630)  Most recent:  Considering your total clinical experience with this particular patient population, how severe are the patient's symptoms at this time?: 7 (11/09/21 1642)  Compared to the patient's condition at the START of treatment, this patient's condition is: 5 (11/09/21 1642)         Precautions:     Behavioral Orders   Procedures     Assault precautions     Cheeking Precautions (behavioral units)     Patient Observed swallowing PO medications; Patient asked to drink water after swallowing medication; Patient in Staff line of sight for 15 minutes after medication given; Mouth checks after PO administration (patient asked to open mouth and stick out their tongue).     Code 1     Code 2     With Security for any imaging/testing needed.     Elopement precautions     Fall precautions     Routine Programming     As clinically indicated     Single Room     Status 15     Every 15 minutes.          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   Stimulant use disorder, in remission.   Transaminitis, possibly medication induced   Hx of CVA  Vit D deficiency  Hypertension  COPD  Hx of infective endocarditis with severe mitral and aortic regurgitation s/p biprostehtic valve replacement 2015  Hx of HFrEF now recovered  Tobacco use disorder  Severe malnutrition in context of acute illness   Urinary  incontinence  Constipation   Malaise  Prolonged QTc         Plan:     Assessment and hospital summary:  The patient is a 54yo male with a history of schizophrenia and TBI and multiple medical co-morbidities as above who was admitted after being transferred from [facility] after a nearly year-long stay. Is currently under commitment with Yanez recently amended to include Clozaril. While at [facility] was noted to have ongoing agitation and frequently attempting to elope from unit requiring 1:1 staffing for safety. He was started on 1:1 staff upon transfer, this was discontinued as patient has been more cooperative without elopement attempts. IM consult completed on admission and continued to follow due to elevated LFTs. Haldol and VPA discontinued due to LFTs. Cross titration completed from risperidone to clozaril after Yanez amended. EPSE/TD movements noted, have appeared stable over past few weeks. Patient has continued to have disorganization and active psychosis symptoms which appear to be at patients baseline. Team continues to work on disposition which barriers include patient has no guardian or next of kin willing to act as surrogate decision maker, see CTC notes for complete details. Patient starting to have increasing symptoms noted week of 9/20, clozapine level ordered and noted to be lower than previous, have added cheeking precautions and increased clozapine dose on 9/24 and moved to split dosing given increasing afternoon agitation. Pt having decreased appetite, increased fatigue over weekend of 10/9-10/10, IM consult placed 10/11, KUB Xray showed large stool burden, bowel regimen modified. Pt was incontinent of stool evening of 10/13. Consolidated clozapine dose to bedtime given concern for daytime sedation with split dosing. Ongoing malaise noted starting 10/11, internal medicine has been contacted periodically. Noted 18# weight loss in 2 months, IM contacted for possible FTT on 11/4.  Ethics consult placed 11/5. Neurology re-consulted 11/8.     12/2 - reached out to CTC lead for update on guardianship process.      Psychiatric treatment/inteventions:  Medications:   -continue clozapine 200mg at bedtime  -continue Haldol 1mg Qday  -continue gabapentin 100mg TID  -continue Remeron 15mg at bedtime.   -continue Buspar 30mg BID.   -continue cheeking precautions, started 9/27 given noted decompensation in recent weeks and lower clozapine level   -continue memantine at 5mg daily for neurocognitive disorder, dose was up to 10mg BID and have been reducing dose, may taper off as patient does not appear to have benefit and reporting oversedation and to reduce anticholinergic load  -continue risperdal 1mg Q6H PRN agitation   -continue lorazepam 0.5-1.0mg Q4H PRN anxiety or severe agitation     -discontinued benztropine 0.25mg BID on 10/21, tapered off to reduce anticholinergic load  -discontinued rivastigmine patch on 10/21,pt did not seemed to have benefit and to reduce anticholinergic load     -Ethics consult placed 11/5 due to patient lacking decision making capacity without guardian or surrogate decision maker and possible need for medical interventions including NJ/NG or PEG tube in future, appreciate assistance, care conference on 11/11    Discussed plan for NG tube with medicine and they are following this closely.   Continue Megace    Encouraged patient to come out for meals and drink the supplements. Ordered double egg portions with breakfast.    Disposition Plan   Reason for ongoing admission: is unable to care for self due to severe psychosis or mariusz  Discharge location: group home  Discharge Medications: not ordered  Follow-up Appointments: not scheduled  Legal Status: full commitment    Entered by: Wilton Morfin on December 23, 2021 at 8:40 AM

## 2021-12-23 NOTE — PLAN OF CARE
Problem: Psychotic Symptoms  Goal: Psychotic Symptoms  Description: Signs and symptoms of listed problems will be absent or manageable.  Outcome: No Change     Problem: Cognitive Impairment (Psychotic Signs/Symptoms)  Goal: Optimal Cognitive Function (Psychotic Signs/Symptoms)  Outcome: No Change     Problem: Malnutrition  Goal: Improved Nutritional Intake  Outcome: No Change     Patient alert to self, isolative and withdrawn, denied mental health illness. Irritable at times, but has been calm this shift. Poor intake, ate less than 25% of breakfast and 50% of lunch. Came out of room for lunch, did not attend groups. Not social with staff or peers. Voided in the toilet, unmeasured urine, with little in the hat. Patient labs done today, see results for details. Of note patient had slight amount of [place] Cells in blood, IM updated, informed staff no action will be taken as BUN is WDL. No further concerns at this time.

## 2021-12-23 NOTE — PLAN OF CARE
"  Problem: Psychotic Symptoms  Goal: Psychotic Symptoms  Description: Signs and symptoms of listed problems will be absent or manageable.  Outcome: No Change  Flowsheets (Taken 12/22/2021 2010)  Psychotic Symptoms Assessed: all  Psychotic Symptoms Present:    affect    insight    mood    thought process    speech    memory deficits    Patient's mood is irritable. He remains isolative to his room and withdrawn. He only came out during dinner time and then immediately went back to his room. He refused to attend groups. He is not interacting much with peers. His responses during the 1:1 conversation was minimal. He denies SI/SIB nor hallucinations. His speech is pressured and rambling. He has disorganized thoughts. His insights is poor. He wants to \"get out from this place\". He is alert and oriented to self and persons only. His affect is flat and blunt. He refused to go to groups. His gait is a little bit unsteady but able to balance. He is disheveled and looks untidy, He refused to take a shower when offered.  He ate about 40% of his dinner. He took all his bedtime meds.    Intake - 540 ml  Output - 120 ml clear, dark yellow urine     "

## 2021-12-24 LAB — SARS-COV-2 RNA RESP QL NAA+PROBE: NEGATIVE

## 2021-12-24 PROCEDURE — U0003 INFECTIOUS AGENT DETECTION BY NUCLEIC ACID (DNA OR RNA); SEVERE ACUTE RESPIRATORY SYNDROME CORONAVIRUS 2 (SARS-COV-2) (CORONAVIRUS DISEASE [COVID-19]), AMPLIFIED PROBE TECHNIQUE, MAKING USE OF HIGH THROUGHPUT TECHNOLOGIES AS DESCRIBED BY CMS-2020-01-R: HCPCS | Performed by: PSYCHIATRY & NEUROLOGY

## 2021-12-24 PROCEDURE — 250N000013 HC RX MED GY IP 250 OP 250 PS 637: Performed by: PSYCHIATRY & NEUROLOGY

## 2021-12-24 PROCEDURE — 124N000003 HC R&B MH SENIOR/ADOLESCENT

## 2021-12-24 RX ORDER — DONEPEZIL HYDROCHLORIDE 5 MG/1
5 TABLET, FILM COATED ORAL AT BEDTIME
Status: DISCONTINUED | OUTPATIENT
Start: 2021-12-24 | End: 2021-12-29

## 2021-12-24 RX ADMIN — GABAPENTIN 100 MG: 100 CAPSULE ORAL at 12:08

## 2021-12-24 RX ADMIN — GABAPENTIN 100 MG: 100 CAPSULE ORAL at 16:44

## 2021-12-24 RX ADMIN — GABAPENTIN 100 MG: 100 CAPSULE ORAL at 08:25

## 2021-12-24 RX ADMIN — LEVOTHYROXINE SODIUM 88 MCG: 88 TABLET ORAL at 08:25

## 2021-12-24 RX ADMIN — MEMANTINE 10 MG: 10 TABLET ORAL at 08:25

## 2021-12-24 RX ADMIN — CLOZAPINE 200 MG: 100 TABLET ORAL at 20:10

## 2021-12-24 RX ADMIN — SALMETEROL XINAFOATE 1 PUFF: 50 POWDER, METERED ORAL; RESPIRATORY (INHALATION) at 08:25

## 2021-12-24 RX ADMIN — MIRTAZAPINE 15 MG: 15 TABLET, FILM COATED ORAL at 20:10

## 2021-12-24 RX ADMIN — MEGESTROL ACETATE 20 MG: 20 TABLET ORAL at 08:25

## 2021-12-24 RX ADMIN — BUSPIRONE HYDROCHLORIDE 30 MG: 15 TABLET ORAL at 20:10

## 2021-12-24 RX ADMIN — SALMETEROL XINAFOATE 1 PUFF: 50 POWDER, METERED ORAL; RESPIRATORY (INHALATION) at 20:10

## 2021-12-24 RX ADMIN — ASPIRIN 81 MG CHEWABLE TABLET 81 MG: 81 TABLET CHEWABLE at 08:25

## 2021-12-24 RX ADMIN — ATORVASTATIN CALCIUM 80 MG: 80 TABLET, FILM COATED ORAL at 20:10

## 2021-12-24 RX ADMIN — MEMANTINE 10 MG: 10 TABLET ORAL at 20:10

## 2021-12-24 RX ADMIN — BUSPIRONE HYDROCHLORIDE 30 MG: 15 TABLET ORAL at 08:25

## 2021-12-24 RX ADMIN — HALOPERIDOL 1 MG: 1 TABLET ORAL at 20:10

## 2021-12-24 RX ADMIN — DONEPEZIL HYDROCHLORIDE 5 MG: 5 TABLET, FILM COATED ORAL at 21:04

## 2021-12-24 ASSESSMENT — ACTIVITIES OF DAILY LIVING (ADL)
HYGIENE/GROOMING: PROMPTS;INDEPENDENT
LAUNDRY: UNABLE TO COMPLETE
HYGIENE/GROOMING: PROMPTS;INDEPENDENT
ORAL_HYGIENE: PROMPTS;INDEPENDENT
LAUNDRY: UNABLE TO COMPLETE
ORAL_HYGIENE: PROMPTS;INDEPENDENT
DRESS: SCRUBS (BEHAVIORAL HEALTH)
DRESS: SCRUBS (BEHAVIORAL HEALTH);INDEPENDENT

## 2021-12-24 NOTE — PLAN OF CARE
Assessment/Intervention/Current Symptoms and Care Coordination:    Current Symptoms include the following: Confusion, delusional thinking, irritability, oriented to only self, and to other, and poor nutrition.    Chart Review    Met with pt.    Meeting with the [facility] Attorney's Office, the  and his supervisor and this writer to clarify and problem-solve the placement challenges with this patient has been re-scheduled for Thursday, December 30, 2021 from 11:00am to 12:00noon.     Discharge Plan or Goal:  Pending stabilization & development of a safe discharge plan.  Considerations include: Nursing home. Guardianship process is being pursued.     Barriers to Discharge:  Patient requires further psychiatric stabilization due to current symptomology, Medication management with possible adjustments and guardianship     Referral Status:  No new referrals made.     Legal Status:  Patient is under MI commitment in [facility] with Yanez. [id number]        Contacts:  Case Management Services:  [facility]   Kurtis Chapin. Phone: [phone]  Supervisor is [email or web address]     Freida Machuca  Sister  [phone]

## 2021-12-24 NOTE — PLAN OF CARE
"  Problem: Psychotic Symptoms  Goal: Psychotic Symptoms  Description: Signs and symptoms of listed problems will be absent or manageable.  Outcome: No Change     Problem: Malnutrition  Goal: Improved Nutritional Intake  Outcome: No Change     Patient is calm medication compliant. Untidy and disheveled, declined ADLs. Denies mental health symptoms, does not appear to be responding to hallucinations.  Did not come out for breakfast, when encouraged  he walked to door and got to the hallway and stated \"I cant make it\" and went back to bed. Patient came out for lunch. Withdrawn and isolative this shift,. Voided in toilet, no incontinence noted. Asymptomatic covid swab sent to lab, patient requested Giovanny Benson before the test, writer informed patient that wasn't possible. Patient allowed writer to perform test without incident. No further concerns at this time.  "

## 2021-12-24 NOTE — PLAN OF CARE
BEHAVIORAL TEAM DISCUSSION     Participants:Juarez Nickerson MD; Juarez Murphy, RN; COLE[facility];     Progress: Pt was transferred from [facility]. Pt is being engaged in the treatment process. He is observed to be a bit more responsive to prompting, in terms of eating his meal and bathing, minimally. Pt has continued to display symptoms of confusion, hallucination. His food intake and hygiene is also a concern.   Medical work- up has not yielded an origin of his malaise.     Anticipated length of stay: Pt is showing mild signs of improvement. He is able to come out and stay in the lounge occassionally. Yesterday, he sat and watched television on the lounge.  Unknown at this time. A [facility] seeking guidance on way forward regarding this patient's need to have a guardian to make his healthcare decisions.     Continued Stay Criteria/Rationale:   The pt is not able to care for himself. He is on a mental health commitment and pittman. It has been difficult to secure a guardian for him. Has no guardian. There is a meeting with the 's office and case management on the 30th of December to review the case. Call has been made to the [facility]'s Ombudsperson.        Medical/Physical: See medical for details  Needs 2 staff to transfer  Staff need to feed the pt     Precautions:           Behavioral Orders   Procedures     Assault precautions     Cheeking Precautions (behavioral units)       Patient Observed swallowing PO medications; Patient asked to drink water after swallowing medication; Patient in Staff line of sight for 15 minutes after medication given; Mouth checks after PO administration (patient asked to open mouth and stick out their tongue).     Code 1     Code 2       With Security for any imaging/testing needed.     Elopement precautions     Fall precautions     Routine Programming       As clinically indicated     Single Room     Status 15       Every 15  minutes.      Plan:   Help pt eat  Medication Management  Ethics consultations as needed  Medications:       Medications:        aspirin  81 mg Oral Daily     atorvastatin  80 mg Oral At Bedtime     busPIRone  30 mg Oral BID     [Held by provider] cholecalciferol  1,250 mcg Oral Q7 Days     cloZAPine  200 mg Oral At Bedtime     gabapentin  100 mg Oral TID w/meals     haloperidol  1 mg Oral At Bedtime     levothyroxine  88 mcg Oral Daily     megestrol  20 mg Oral Daily     memantine  10 mg Oral BID     [Held by provider] metoprolol tartrate  25 mg Oral BID     mirtazapine  15 mg Oral At Bedtime     salmeterol  1 puff Inhalation BID        Pt will be encouraged to engaged in the therapeutic milieu  Pt will be encouraged to attend groups to learn and practice positive skills to cope with his symptoms  Medical consultations will be implemented as needed  Care coordination will be implemented.

## 2021-12-24 NOTE — PLAN OF CARE
Patient has spent most of the shift in his room. [facility] and sat and watched TV. His affect is flat but brightens upon approach. His mood is calm. He denies SI and SIB. He denies all MH symptoms. He denies pain. He has good oral intake and ate 75% of his dinner. He did not attend groups. He is not social with peers. He only speaks when spoken to. He is medication compliant. He voids in the toilet with no incontinence. He refused to shower. He does not appear to be responding to internal stimuli. He voices no concerns.

## 2021-12-24 NOTE — PLAN OF CARE
Assessment/Intervention/Current Symptoms and Care Coordination:    Current Symptoms include the following: Confusion, delusional thinking, irritability, oriented to only self, and to other, and poor nutrition.    Chart Review    Met with pt.    Meeting with the [facility] Attorney's Office, the  and his supervisor and this writer to clarify and problem-solve the placement challenges with this patient has been re-scheduled for Thursday, December 30, 2021 from 11:00am to 12:00noon.     Discharge Plan or Goal:  Pending stabilization & development of a safe discharge plan.  Considerations include: Nursing home. Guardianship process is being pursued.     Barriers to Discharge:  Patient requires further psychiatric stabilization due to current symptomology, Medication management with possible adjustments and guardianship     Referral Status:  No new referrals made.     Legal Status:  Patient is under MI commitment in [facility] with Yanez. [id number]        Contacts:  Case Management Services:  [facility]   Kurtis Chapin. Phone: [phone]  Supervisor is [email or web address]     Freida Machuca  Sister  [phone]

## 2021-12-24 NOTE — PLAN OF CARE
Problem: Psychotic Symptoms  Goal: Psychotic Symptoms  Description: Signs and symptoms of listed problems will be absent or manageable.  Outcome: No Change     Slept well for 9.75 hours  Continent of urine x 1, voided 200 ml of dark colored concentrated urine.  Independent in repositioning in bed.  Intake: 0  O2 sats 98%at room air  Pulse 103 bpm

## 2021-12-25 PROCEDURE — 250N000013 HC RX MED GY IP 250 OP 250 PS 637: Performed by: PSYCHIATRY & NEUROLOGY

## 2021-12-25 PROCEDURE — 124N000003 HC R&B MH SENIOR/ADOLESCENT

## 2021-12-25 RX ADMIN — ATORVASTATIN CALCIUM 80 MG: 80 TABLET, FILM COATED ORAL at 20:10

## 2021-12-25 RX ADMIN — LEVOTHYROXINE SODIUM 88 MCG: 88 TABLET ORAL at 08:16

## 2021-12-25 RX ADMIN — GABAPENTIN 100 MG: 100 CAPSULE ORAL at 17:15

## 2021-12-25 RX ADMIN — SALMETEROL XINAFOATE 1 PUFF: 50 POWDER, METERED ORAL; RESPIRATORY (INHALATION) at 08:16

## 2021-12-25 RX ADMIN — SALMETEROL XINAFOATE 1 PUFF: 50 POWDER, METERED ORAL; RESPIRATORY (INHALATION) at 20:10

## 2021-12-25 RX ADMIN — ASPIRIN 81 MG CHEWABLE TABLET 81 MG: 81 TABLET CHEWABLE at 08:16

## 2021-12-25 RX ADMIN — MEGESTROL ACETATE 20 MG: 20 TABLET ORAL at 08:16

## 2021-12-25 RX ADMIN — BUSPIRONE HYDROCHLORIDE 30 MG: 15 TABLET ORAL at 08:16

## 2021-12-25 RX ADMIN — HALOPERIDOL 1 MG: 1 TABLET ORAL at 20:10

## 2021-12-25 RX ADMIN — MIRTAZAPINE 15 MG: 15 TABLET, FILM COATED ORAL at 20:10

## 2021-12-25 RX ADMIN — MEMANTINE 10 MG: 10 TABLET ORAL at 20:10

## 2021-12-25 RX ADMIN — DONEPEZIL HYDROCHLORIDE 5 MG: 5 TABLET, FILM COATED ORAL at 20:10

## 2021-12-25 RX ADMIN — GABAPENTIN 100 MG: 100 CAPSULE ORAL at 08:16

## 2021-12-25 RX ADMIN — GABAPENTIN 100 MG: 100 CAPSULE ORAL at 12:27

## 2021-12-25 RX ADMIN — MEMANTINE 10 MG: 10 TABLET ORAL at 08:16

## 2021-12-25 RX ADMIN — BUSPIRONE HYDROCHLORIDE 30 MG: 15 TABLET ORAL at 20:10

## 2021-12-25 RX ADMIN — CLOZAPINE 200 MG: 100 TABLET ORAL at 20:10

## 2021-12-25 ASSESSMENT — ACTIVITIES OF DAILY LIVING (ADL)
DRESS: SCRUBS (BEHAVIORAL HEALTH);INDEPENDENT
ORAL_HYGIENE: PROMPTS;INDEPENDENT
ORAL_HYGIENE: PROMPTS;INDEPENDENT
HYGIENE/GROOMING: INDEPENDENT;PROMPTS
LAUNDRY: UNABLE TO COMPLETE
HYGIENE/GROOMING: PROMPTS;INDEPENDENT
LAUNDRY: UNABLE TO COMPLETE
DRESS: SCRUBS (BEHAVIORAL HEALTH)

## 2021-12-25 NOTE — PLAN OF CARE
Problem: Psychotic Symptoms  Goal: Psychotic Symptoms  Description: Signs and symptoms of listed problems will be absent or manageable.  Outcome: Improving     Slept well for 9.75 hours  Continent of urine x 1  No intake this shift  Pt was more alert and responsive tonight compared to the previous nights  O2 sats 99 % at room air  Pulse 94 bpm

## 2021-12-25 NOTE — PROGRESS NOTES
PSYCHIATRIC PROGRESS NOTE    Admission Date: 06/30/2021  Date of Service: 12/24/2021    The patient was seen, his chart reviewed, his case discussed with staff at the Team Meeting.  No significant changes were noted in his mental status and attitude. He continued to spend most of the time in his room napping but was in the lounge yesterday watching TV.  No grossly disruptive behavior was noted except for occasional irritability. He has been sleeping well at night and has been medication compliant.    This is a 55-year-old single white male with a lifelong history of mental illness, TBI, neurocognitive disorder and polysubstance abuse who had spent several months at [facility] and was transferred here after being seen by Dr. Wilks for psychiatric consultation. He is committed and Jarvised. He has been under my care in July. His scheduled Risperdal was discontinued and he was started on Clozaril since his Yanez was amended. Subsequently I started him on Namenda and Exelon Patch. His Clozaril dose was slightly increased. He seems to have tolerated his medications well. His Clozaril levels were therapeutic but became too high at the end of October and his Clozaril dose was decreased. Since I saw him in October BuSpar and Remeron were added to his regimen as well as Gabapentin and a small dose of Haldol was added at bedtime. His Exelon Patch was discontinued mainly because of his refusal to take it. When I saw him last time I increased his BuSpar.     MEDICATIONS, psychotropic   Clozaril 200 mg at bedtime   Haldol 1 mg at bedtime   Gabapentin 100 mg TID   Buspar 30 mg BID   Remeron 15 mg at bedtime   Namenda 10 mg BID   Risperdal M-Tab 1 mg Q6H PRN   Melatonin 3 mg HS PRN     LABS: No new results.    VS: Pulse - 106, BP - 110/78, T - 97.4, Resp - 16, SpO2 - 97%    MENTAL STATUS EXAMINATION   Revealed a normally built and normally developed, disheveled and unkempt 55-year-old white male  appearing older than his stated age. He presented with involuntary tongue movements. He presented with poverty of speech and paucity of ideas. His affect was blunted. He denied SI and HI. He was alert but oriented to self only. He denied hallucinations but appeared to be responding to internal stimuli. He had no insight into his problems and his judgement was impaired. He continued to present with marked impairment of his memory and cognition.     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    Juarez Nickerson MD

## 2021-12-25 NOTE — PLAN OF CARE
"Patient spent most of the shift in his room. His affect is flat. His mood is calm. He denies SI and SIB. He denies all MH symptoms. He came to the [facility] for snack and dinner eating 100% both times. He has had good oral intake-see flow sheets. He voids without difficulty. No incontinence. He refused shower. When he came to the dining room he was social with one patient and watched TV with her. He denies pain. He did not attend groups. He is medication compliant. He stated he wanted a a gift certificate for [employer] so he could buy \"smokes\". He voices no concerns. He is alert to self only. He stated \"Idalmis  been in the hospital since 1997 and I'm discharging tomorrow\". Covid test negative. Patient was started on Aricept.  "

## 2021-12-25 NOTE — PLAN OF CARE
Problem: Psychotic Symptoms  Goal: Psychotic Symptoms  Description: Signs and symptoms of listed problems will be absent or manageable.  Outcome: No Change     Problem: Cognitive Impairment (Psychotic Signs/Symptoms)  Goal: Optimal Cognitive Function (Psychotic Signs/Symptoms)  Outcome: No Change  Intervention: Support and Promote Cognitive Ability  Recent Flowsheet Documentation  Taken 12/25/2021 0900 by Juarez Murphy RN  Trust Relationship/Rapport:    care explained    questions answered    thoughts/feelings acknowledged     Problem: Malnutrition  Goal: Improved Nutritional Intake  Outcome: Improving     Patient's mood is calm, affect is flat and blunted. Patient is medication compliant. Denies mental health illness, does not appear to be responding to internal stimuli. Refused ADLs, came out for lunch. Poor appetite for breakfast but good appetite for lunch, ate 75% of meal. Patient was social with one peer at lunch, allowed patient to pat his back. Patient is voiding in toilet, unmeasured franko urine, oral intake 280cc this shift. Denies pain, patient came out of room in the early afternoon to  have gena snacks in the milieu. no further concerns at this time.

## 2021-12-26 PROCEDURE — 124N000003 HC R&B MH SENIOR/ADOLESCENT

## 2021-12-26 PROCEDURE — 250N000013 HC RX MED GY IP 250 OP 250 PS 637: Performed by: PSYCHIATRY & NEUROLOGY

## 2021-12-26 RX ADMIN — BUSPIRONE HYDROCHLORIDE 30 MG: 15 TABLET ORAL at 19:59

## 2021-12-26 RX ADMIN — CLOZAPINE 200 MG: 100 TABLET ORAL at 19:59

## 2021-12-26 RX ADMIN — SALMETEROL XINAFOATE 1 PUFF: 50 POWDER, METERED ORAL; RESPIRATORY (INHALATION) at 08:21

## 2021-12-26 RX ADMIN — SALMETEROL XINAFOATE 1 PUFF: 50 POWDER, METERED ORAL; RESPIRATORY (INHALATION) at 20:42

## 2021-12-26 RX ADMIN — ASPIRIN 81 MG CHEWABLE TABLET 81 MG: 81 TABLET CHEWABLE at 08:21

## 2021-12-26 RX ADMIN — GABAPENTIN 100 MG: 100 CAPSULE ORAL at 08:22

## 2021-12-26 RX ADMIN — MEMANTINE 10 MG: 10 TABLET ORAL at 19:59

## 2021-12-26 RX ADMIN — GABAPENTIN 100 MG: 100 CAPSULE ORAL at 12:42

## 2021-12-26 RX ADMIN — BUSPIRONE HYDROCHLORIDE 30 MG: 15 TABLET ORAL at 08:22

## 2021-12-26 RX ADMIN — GABAPENTIN 100 MG: 100 CAPSULE ORAL at 16:53

## 2021-12-26 RX ADMIN — MEMANTINE 10 MG: 10 TABLET ORAL at 08:22

## 2021-12-26 RX ADMIN — DONEPEZIL HYDROCHLORIDE 5 MG: 5 TABLET, FILM COATED ORAL at 19:59

## 2021-12-26 RX ADMIN — LEVOTHYROXINE SODIUM 88 MCG: 88 TABLET ORAL at 08:22

## 2021-12-26 RX ADMIN — MEGESTROL ACETATE 20 MG: 20 TABLET ORAL at 08:22

## 2021-12-26 RX ADMIN — MIRTAZAPINE 15 MG: 15 TABLET, FILM COATED ORAL at 19:59

## 2021-12-26 RX ADMIN — ATORVASTATIN CALCIUM 80 MG: 80 TABLET, FILM COATED ORAL at 19:59

## 2021-12-26 RX ADMIN — HALOPERIDOL 1 MG: 1 TABLET ORAL at 19:59

## 2021-12-26 ASSESSMENT — ACTIVITIES OF DAILY LIVING (ADL)
HYGIENE/GROOMING: PROMPTS;INDEPENDENT
HYGIENE/GROOMING: INDEPENDENT;PROMPTS
DRESS: SCRUBS (BEHAVIORAL HEALTH)
LAUNDRY: UNABLE TO COMPLETE
LAUNDRY: UNABLE TO COMPLETE
ORAL_HYGIENE: INDEPENDENT;PROMPTS
DRESS: SCRUBS (BEHAVIORAL HEALTH);INDEPENDENT
ORAL_HYGIENE: PROMPTS;INDEPENDENT

## 2021-12-26 NOTE — PLAN OF CARE
Problem: Sleep Disturbance  Goal: Adequate Sleep/Rest  Outcome: No Change     Slept well tonight for 10 hours  No intake/output this shift  Independent in turning to sides.  No behavioral issues encountered this shift.

## 2021-12-26 NOTE — PLAN OF CARE
Patient spent most of the shift in his room. His affect is flat. His mood is calm. He denies SI and SIB. He denies all MH symptoms. He came to the [facility] for snacks and dinner. He ate 75% of his dinner and 5 cupcakes for snacks. He has had good fluid intake-see flow sheets. He voids without difficulty. No incontinence. He refused shower. When he came to the dining room he was social with select peers and watched TV. He denies pain. He did not attend groups. He is medication compliant. He received a [employer] gift this evening and voiced how grateful he was. He is medication compliant. He voices no concerns.

## 2021-12-26 NOTE — PLAN OF CARE
Problem: Psychotic Symptoms  Goal: Psychotic Symptoms  Description: Signs and symptoms of listed problems will be absent or manageable.  Outcome: No Change     Problem: Cognitive Impairment (Psychotic Signs/Symptoms)  Goal: Optimal Cognitive Function (Psychotic Signs/Symptoms)  Outcome: No Change  Intervention: Support and Promote Cognitive Ability  Recent Flowsheet Documentation  Taken 12/26/2021 1000 by Juarez Murphy RN  Trust Relationship/Rapport:   care explained   choices provided   emotional support provided   empathic listening provided   questions encouraged   questions answered   thoughts/feelings acknowledged   reassurance provided     Problem: Behavioral Disturbance  Goal: Behavioral Disturbance  Description: Signs and symptoms of listed problems will be absent or manageable by discharge or transition of care.  Outcome: Improving     Patient was calm and cooperative. Medication compliant. Declined shower this morning but said he might take one this afternoon. Poor oral intake this shift only eating a few bites of both meals, intake 400cc this shift, voids in toilet.  Denies mental health illness, does not appear to be responding to internal stimuli. Isolative and withdrawn.

## 2021-12-27 LAB
ANION GAP SERPL CALCULATED.3IONS-SCNC: 5 MMOL/L (ref 3–14)
BUN SERPL-MCNC: 15 MG/DL (ref 7–30)
CALCIUM SERPL-MCNC: 8.7 MG/DL (ref 8.5–10.1)
CHLORIDE BLD-SCNC: 118 MMOL/L (ref 94–109)
CO2 SERPL-SCNC: 21 MMOL/L (ref 20–32)
CREAT SERPL-MCNC: 0.61 MG/DL (ref 0.66–1.25)
GFR SERPL CREATININE-BSD FRML MDRD: >90 ML/MIN/1.73M2
GLUCOSE BLD-MCNC: 101 MG/DL (ref 70–99)
MAGNESIUM SERPL-MCNC: 2 MG/DL (ref 1.6–2.3)
PHOSPHATE SERPL-MCNC: 3.6 MG/DL (ref 2.5–4.5)
POTASSIUM BLD-SCNC: 3.8 MMOL/L (ref 3.4–5.3)
SODIUM SERPL-SCNC: 144 MMOL/L (ref 133–144)

## 2021-12-27 PROCEDURE — 124N000003 HC R&B MH SENIOR/ADOLESCENT

## 2021-12-27 PROCEDURE — 80048 BASIC METABOLIC PNL TOTAL CA: CPT | Performed by: PSYCHIATRY & NEUROLOGY

## 2021-12-27 PROCEDURE — 250N000013 HC RX MED GY IP 250 OP 250 PS 637: Performed by: PSYCHIATRY & NEUROLOGY

## 2021-12-27 PROCEDURE — 83735 ASSAY OF MAGNESIUM: CPT | Performed by: PSYCHIATRY & NEUROLOGY

## 2021-12-27 PROCEDURE — 84100 ASSAY OF PHOSPHORUS: CPT | Performed by: PSYCHIATRY & NEUROLOGY

## 2021-12-27 PROCEDURE — 36415 COLL VENOUS BLD VENIPUNCTURE: CPT | Performed by: PSYCHIATRY & NEUROLOGY

## 2021-12-27 RX ADMIN — GABAPENTIN 100 MG: 100 CAPSULE ORAL at 17:23

## 2021-12-27 RX ADMIN — BUSPIRONE HYDROCHLORIDE 30 MG: 15 TABLET ORAL at 19:08

## 2021-12-27 RX ADMIN — GABAPENTIN 100 MG: 100 CAPSULE ORAL at 08:26

## 2021-12-27 RX ADMIN — MEMANTINE 10 MG: 10 TABLET ORAL at 19:08

## 2021-12-27 RX ADMIN — BUSPIRONE HYDROCHLORIDE 30 MG: 15 TABLET ORAL at 08:26

## 2021-12-27 RX ADMIN — HALOPERIDOL 1 MG: 1 TABLET ORAL at 19:08

## 2021-12-27 RX ADMIN — GABAPENTIN 100 MG: 100 CAPSULE ORAL at 13:55

## 2021-12-27 RX ADMIN — LEVOTHYROXINE SODIUM 88 MCG: 88 TABLET ORAL at 08:27

## 2021-12-27 RX ADMIN — ATORVASTATIN CALCIUM 80 MG: 80 TABLET, FILM COATED ORAL at 19:08

## 2021-12-27 RX ADMIN — MIRTAZAPINE 15 MG: 15 TABLET, FILM COATED ORAL at 19:08

## 2021-12-27 RX ADMIN — ASPIRIN 81 MG CHEWABLE TABLET 81 MG: 81 TABLET CHEWABLE at 08:27

## 2021-12-27 RX ADMIN — DONEPEZIL HYDROCHLORIDE 5 MG: 5 TABLET, FILM COATED ORAL at 19:08

## 2021-12-27 RX ADMIN — CLOZAPINE 200 MG: 100 TABLET ORAL at 19:08

## 2021-12-27 RX ADMIN — SALMETEROL XINAFOATE 1 PUFF: 50 POWDER, METERED ORAL; RESPIRATORY (INHALATION) at 08:26

## 2021-12-27 RX ADMIN — SALMETEROL XINAFOATE 1 PUFF: 50 POWDER, METERED ORAL; RESPIRATORY (INHALATION) at 19:09

## 2021-12-27 RX ADMIN — MEGESTROL ACETATE 20 MG: 20 TABLET ORAL at 08:27

## 2021-12-27 RX ADMIN — MEMANTINE 10 MG: 10 TABLET ORAL at 08:27

## 2021-12-27 ASSESSMENT — ACTIVITIES OF DAILY LIVING (ADL)
HYGIENE/GROOMING: PROMPTS;INDEPENDENT
LAUNDRY: UNABLE TO COMPLETE
DRESS: SCRUBS (BEHAVIORAL HEALTH)
ORAL_HYGIENE: PROMPTS;INDEPENDENT
HYGIENE/GROOMING: PROMPTS;INDEPENDENT
LAUNDRY: UNABLE TO COMPLETE
DRESS: SCRUBS (BEHAVIORAL HEALTH)
ORAL_HYGIENE: PROMPTS;INDEPENDENT

## 2021-12-27 NOTE — PLAN OF CARE
"  Problem: Psychotic Symptoms  Goal: Psychotic Symptoms  Description: Signs and symptoms of listed problems will be absent or manageable.  Outcome: No Change     Problem: Sleep Disturbance  Goal: Adequate Sleep/Rest  Outcome: No Change     Problem: Cognitive Impairment (Psychotic Signs/Symptoms)  Goal: Optimal Cognitive Function (Psychotic Signs/Symptoms)  Outcome: No Change  Intervention: Support and Promote Cognitive Ability  Recent Flowsheet Documentation  Taken 12/27/2021 1000 by Juarez Murphy RN  Trust Relationship/Rapport: care explained     Problem: Malnutrition  Goal: Improved Nutritional Intake  Outcome: Improving       Patient is calm, isolative and withdrawn. Did not come out for breakfast or lunch, writer encouraged patient to walk to dining room, patient got half way and turned around stating \"I cant make it\".  Denies mental health concerns, Patient observed after lunch responding to internal stimuli, writer was in cruz and observed patient yelling \"Fuc*ing wh*re\", no one was in the room except patient.   Labs drawn today, results unremarkable. Declined ADLs and became agitated when encouraged.    PM:  Patient isolative and withdrawn, affect flat and blunted. Endorses depression stating \"yeah I'm depressed, I don't know why I'm here\". Writer reoriented patient to environment but patient became upset stating \"I can stay with my sister!\". Patient refused ADLs, had poor oral intake ate only bites of dinner. Voided into toilet franko unmeasured urine. Oral intake 400cc.   Patient to get weight taken tomorrow per order.  "

## 2021-12-27 NOTE — PLAN OF CARE
"Patient spent most of the shift in his room. His affect is flat. His mood is calm. He has been pleasant and cooperative. He denies all MH symptoms. He denies pain. He came to the [facility] for snacks and dinner. He ate 75% of his dinner and 100% of his snacks. He has had good fluid intake. His gait is unsteady and slow. His speech is un clear due to mumbling. He asked this writer \"where am I\". He is alert to self only. He refused a shower. He is medication compliant. He is voiding without difficulty. He voices no concerns.   "

## 2021-12-27 NOTE — PLAN OF CARE
Assessment/Intervention/Current Symptoms and Care Coordination:    Current Symptoms include the following: Confusion, delusional thinking, irritability, oriented to only self, and to other, and poor nutrition. Pt is improving in terms of coming out in the milieu and having his food there. Still isolating.    Chart Review    Met with pt.    Meeting with the [facility] Attorney's Office, the  and his supervisor and this writer to clarify and problem-solve the placement challenges with this patient has been re-scheduled for Thursday, December 30, 2021 from 11:00am to 12:00noon.     Discharge Plan or Goal:  Pending stabilization & development of a safe discharge plan.  Considerations include: Nursing home. Guardianship process is being pursued.     Barriers to Discharge:  Patient requires further psychiatric stabilization due to current symptomology, Medication management with possible adjustments and guardianship     Referral Status:  No new referrals made.     Legal Status:  Patient is under MI commitment in [facility] with Yanez. [id number]        Contacts:  Case Management Services:  [facility]   Kurtis Chapin. Phone: [phone]  Supervisor is [email or web address]     Freida Talley  [phone]

## 2021-12-27 NOTE — PLAN OF CARE
Problem: Psychotic Symptoms  Goal: Psychotic Symptoms  Description: Signs and symptoms of listed problems will be absent or manageable.  Outcome: Improving       Slept well for 9.75 hours  Continent of urine x 1  No intake this shift  Able to reposition self on bed  O2 sats 95% at room air P 97 bpm

## 2021-12-28 LAB — GLUCOSE BLDC GLUCOMTR-MCNC: 100 MG/DL (ref 70–99)

## 2021-12-28 PROCEDURE — 250N000013 HC RX MED GY IP 250 OP 250 PS 637: Performed by: PSYCHIATRY & NEUROLOGY

## 2021-12-28 PROCEDURE — 124N000003 HC R&B MH SENIOR/ADOLESCENT

## 2021-12-28 RX ADMIN — MEMANTINE 10 MG: 10 TABLET ORAL at 20:05

## 2021-12-28 RX ADMIN — DONEPEZIL HYDROCHLORIDE 5 MG: 5 TABLET, FILM COATED ORAL at 20:05

## 2021-12-28 RX ADMIN — ATORVASTATIN CALCIUM 80 MG: 80 TABLET, FILM COATED ORAL at 20:04

## 2021-12-28 RX ADMIN — MIRTAZAPINE 15 MG: 15 TABLET, FILM COATED ORAL at 20:05

## 2021-12-28 RX ADMIN — MEMANTINE 10 MG: 10 TABLET ORAL at 09:59

## 2021-12-28 RX ADMIN — SALMETEROL XINAFOATE 1 PUFF: 50 POWDER, METERED ORAL; RESPIRATORY (INHALATION) at 20:11

## 2021-12-28 RX ADMIN — BUSPIRONE HYDROCHLORIDE 30 MG: 15 TABLET ORAL at 09:59

## 2021-12-28 RX ADMIN — LEVOTHYROXINE SODIUM 88 MCG: 88 TABLET ORAL at 09:59

## 2021-12-28 RX ADMIN — HALOPERIDOL 1 MG: 1 TABLET ORAL at 20:10

## 2021-12-28 RX ADMIN — GABAPENTIN 100 MG: 100 CAPSULE ORAL at 09:59

## 2021-12-28 RX ADMIN — ASPIRIN 81 MG CHEWABLE TABLET 81 MG: 81 TABLET CHEWABLE at 09:59

## 2021-12-28 RX ADMIN — MEGESTROL ACETATE 20 MG: 20 TABLET ORAL at 09:59

## 2021-12-28 RX ADMIN — GABAPENTIN 100 MG: 100 CAPSULE ORAL at 13:11

## 2021-12-28 RX ADMIN — CLOZAPINE 200 MG: 100 TABLET ORAL at 20:04

## 2021-12-28 RX ADMIN — GABAPENTIN 100 MG: 100 CAPSULE ORAL at 17:41

## 2021-12-28 RX ADMIN — BUSPIRONE HYDROCHLORIDE 30 MG: 15 TABLET ORAL at 20:04

## 2021-12-28 ASSESSMENT — ACTIVITIES OF DAILY LIVING (ADL)
HYGIENE/GROOMING: PROMPTS;INDEPENDENT
HYGIENE/GROOMING: INDEPENDENT;PROMPTS
DRESS: INDEPENDENT
LAUNDRY: UNABLE TO COMPLETE
ORAL_HYGIENE: PROMPTS
ORAL_HYGIENE: INDEPENDENT;PROMPTS
DRESS: INDEPENDENT;PROMPTS

## 2021-12-28 NOTE — PLAN OF CARE
Problem: Sleep Disturbance  Goal: Adequate Sleep/Rest  Outcome: Improving     Slept well for 9.5 hours.  Independent in going to the toilet, voided x 1  Requested for Orange juice, drank 1/2 of it.  O2 sats  97% at room air P 98

## 2021-12-28 NOTE — PLAN OF CARE
"Patients appetite remains poor.  Taking few bites of his meal but drinking the milk and protein drink.  Admits to \"a little\" anxiety and depression.  Isolates to his room.  Very forgetful.  Keeps asking when he is going home.  P: continue same plan of care.  "

## 2021-12-28 NOTE — PROGRESS NOTES
CLINICAL NUTRITION SERVICES - REASSESSMENT NOTE     Nutrition Prescription    RECOMMENDATIONS FOR PROVIDERS TO ADDRESS:  Unit staff should continue to encourage pt's po intakes  Unit staff need to obtain new weight     Malnutrition Status:    Severe malnutrition in the context of acute on chronic illness    Recommendations already ordered by Registered Dietitian (RD):  None today - continue current orders as reviewed below     Future/Additional Recommendations:  Due to no significant changes in RD interventions with minimal weight history over the last 2 weeks, RD services will plan to monitor progress toward goals q 14 days unless otherwise consulted     EVALUATION OF THE PROGRESS TOWARD GOALS   Diet: Mechanical/Dental Soft - add Gatorade with all meals, allow write ins of ham and american cheese sandwich, mandarin oranges, double portions of eggs with breakfast each morning  Supplement: Any supplement PRN - nursing staff can call for any supplement at any time, but will hold auto sending supplements at present due to excess stock on the unit  Intake: <% per flow sheets        NEW FINDINGS   MAR reviewed, noted pt remains on 2 medications that encourage appetite (Remeron and Megace). Labs reviewed and WNL as of recent. It was previously decided/suggested from Ethics to not seek nutrition support options unless pt would voluntarily agree. Pt continues to have significantly varying po intakes from day to day, continues to isolative to room most of the time. Last RD note indicated pt irritable during visit attempt and provided limited information to RD services. RD services attempted phone call to unit to speak to staff, was disconnected.     12/12/21 1351 75.6 kg (166 lb 11.2 oz)   11/11/21 0850 79.8 kg (176 lb)   09/07/21 0835 90 kg (198 lb 6.4 oz)   09/05/21 0853 90.1 kg (198 lb 9.6 oz)   08/29/21 0800 89.5 kg (197 lb 4.8 oz)   08/18/21 0700 89.4 kg (197 lb 1.6 oz)   08/12/21 0849 89.1 kg (196 lb 8 oz)    08/10/21 0753 89.9 kg (198 lb 1.6 oz)   08/08/21 0932 89.8 kg (197 lb 14.4 oz)   07/22/21 0841 89.2 kg (196 lb 9.6 oz)   07/15/21 0837 89.3 kg (196 lb 12.8 oz)   07/04/21 0836 87.5 kg (192 lb 14.4 oz)   06/30/21 2025 89.1 kg (196 lb 8 oz)     03/31/21 84.2 kg (185 lb 9.6 oz)   08/26/20 70.3 kg (155 lb)     Weight assessment: No new weight in over 2 weeks to assess for any recent change, significant 5.6% weight loss in 1 month, significant 16.1% weight loss in 3 months, significant 15.3% weight loss in almost 6 months, 11 lb wt gain from >1 year ago.     MALNUTRITION  % Intake: </=50% for >/= 1 month (severe)  % Weight Loss: > 5% in 1 month (severe)  Subcutaneous Fat Loss: None observed-visual only - per RD note from 12/20/21  Muscle Loss: Temporal:  mild, Facial & jaw region:  moderate and Posterior calf:  Moderate-visual only - per RD note from 12/20/21  Fluid Accumulation/Edema: None noted  Malnutrition Diagnosis: Severe malnutrition in the context of acute on chronic illness     Previous Goals   Patient to consume % of nutritionally adequate meal trays TID, or the equivalent with supplements/snacks  Evaluation: Not met    Previous Nutrition Diagnosis  Inadequate oral intake of unclear etiology (menu fatigue, mental healthy) as evidenced by poor intake and severe weight loss  Evaluation: No change    CURRENT NUTRITION DIAGNOSIS  Inadequate oral intake related to multifactorial (altered mental status, menu fatigue) as evidenced by continued varying po intakes of <25% up to 100% with significant weight loss over the last month       INTERVENTIONS  Implementation  Medical food supplement therapy - continue as ordered above   Modify composition of meals/snacks - continue as ordered above     Goals  Patient to consume % of nutritionally adequate meal trays TID, or the equivalent with supplements/snacks.    Monitoring/Evaluation  Due to no significant changes in RD interventions with minimal weight history  over the last 2 weeks, RD services will plan to monitor progress toward goals q 14 days unless otherwise consulted.    Emily Manzo RD, CNSC, LD  [facility] RD pager: [phone]

## 2021-12-28 NOTE — PLAN OF CARE
Assessment/Intervention/Current Symptoms and Care Coordination:    Current Symptoms include the following: Confusion, delusional thinking, irritability, oriented to only self, and to other, and poor nutrition. Pt is improving in terms of coming out in the milieu and having his food there. Still isolating.    Chart Review    Contacted Asif, the [facility]'s mental health ombudsperson ([phone]). Writer left a voicemail informing that writer will call again tomorrow. In his voicemail, Asif had indicated he was gone for about 8 days.    Meeting with the [facility] Attorney's Office, the  and his supervisor and this writer to clarify and problem-solve the placement challenges with this patient has been re-scheduled for Thursday, December 30, 2021 from 11:00am to 12:00noon.     Discharge Plan or Goal:  Pending stabilization & development of a safe discharge plan.  Considerations include: Nursing home. Guardianship process is being pursued.     Barriers to Discharge:  Patient requires further psychiatric stabilization due to current symptomology, Medication management with possible adjustments and guardianship     Referral Status:  No new referrals made.     Legal Status:  Patient is under MI commitment in [facility] with Yanez. 25-DL-MI-        Contacts:  Case Management Services:  [facility]   Kurtis Chapin. Phone: [phone]  Supervisor is [email or web address]     Freida Talley  [phone]

## 2021-12-28 NOTE — PROGRESS NOTES
PSYCHIATRIC PROGRESS NOTE    Admission Date: 06/30/2021  Date of Service: 12/27/2021    The patient was seen, his chart reviewed, his case discussed with staff.  He appears to be slightly more animated and has been spending relatively more time out of his room.  He tolerated initial doses of Aricept well. No grossly disruptive behavior noted. He continues to stay by himself.    John is a 55-year-old single white male with a lifelong history of mental illness, TBI, neurocognitive disorder and polysubstance abuse who had spent several months at [facility] and was transferred here after being seen by Dr. Wilks for psychiatric consultation. He is committed and Jarvised. He has been under my care in July. His scheduled Risperdal was discontinued and he was started on Clozaril since his Yanez was amended. Subsequently I started him on Namenda and Exelon Patch. His Clozaril dose was slightly increased. He seems to have tolerated his medications well. His Clozaril levels were therapeutic but became too high at the end of October and his Clozaril dose was decreased. Since I saw him in October BuSpar and Remeron were added to his regimen as well as Gabapentin and a small dose of Haldol was added at bedtime. His Exelon Patch was discontinued mainly because of his refusal to take it. When I saw him last time I started him on Aricept.  .   MEDICATIONS, psychotropic   Aricept 5 mg at bedtime  Clozaril 200 mg at bedtime   Haldol 1 mg at bedtime   Gabapentin 100 mg TID   Buspar 30 mg BID   Remeron 15 mg at bedtime   Namenda 10 mg BID   Risperdal M-Tab 1 mg Q6H PRN   Melatonin 3 mg HS PRN     LABS: No new results.    VS: Pulse - 109, BP - 102//76, T - 97.6, Resp - 16, SpO2 - 99%    MENTAL STATUS EXAMINATION   Revealed a normally built and normally developed, disheveled and unkempt 55-year-old white male appearing older than his stated age. He presented with involuntary tongue movements. He presented  with poverty of speech and paucity of ideas. His affect was blunted. He denied SI and HI. He was alert but oriented to self only. No overt psychotic features were noted or elicited. He had no insight into his problems and his judgement was impaired. He continued to present with marked impairment of his memory and cognition.     DIAGNOSTIC IMPRESSION   Schizophrenia Chronic, undifferentiated type   Neurocognitive Disorder secondary to TBI   Tardive Dyskinesia   Alcohol Use Disorder, in controlled environment   Stimulant Use Disorder, in controlled environment      Plan: Continue medications without change.    Juarez Nickerson MD

## 2021-12-28 NOTE — PLAN OF CARE
Patient alert and oriented to person, place, and time, untidy. Pt exhibits flat affect, medication compliant, independent ADL's. No PRN's given. Pt on falls, assault, cheeking, elopement precautions, no behaviors observed. Pt isolative in room, refuses engagement in groups or milieu. Pt denies pain.  Will continue to monitor behavior and encourage engagement.      NURSING ASSESSMENT  Pain: denies  Anxiety: endorses increased anxiety  Depression: denies  SI: denies  SIB: denies  HI: denies  AVH: denies, did not demonstrate delusional thinking, did not appear to be responding to internal stimuli.   Mood/Affect: flat   Medication: compliant, no side effects reported, none observed  PRN: none  Appetite: breakfast 25%    Lunch refused  ADLs: independent   Visits: none  Vitals: WNL   Medical: denies

## 2021-12-29 PROCEDURE — 250N000013 HC RX MED GY IP 250 OP 250 PS 637: Performed by: PSYCHIATRY & NEUROLOGY

## 2021-12-29 PROCEDURE — H2032 ACTIVITY THERAPY, PER 15 MIN: HCPCS

## 2021-12-29 PROCEDURE — 124N000003 HC R&B MH SENIOR/ADOLESCENT

## 2021-12-29 RX ORDER — DONEPEZIL HYDROCHLORIDE 10 MG/1
10 TABLET, FILM COATED ORAL AT BEDTIME
Status: DISCONTINUED | OUTPATIENT
Start: 2021-12-29 | End: 2022-01-18 | Stop reason: HOSPADM

## 2021-12-29 RX ADMIN — GABAPENTIN 100 MG: 100 CAPSULE ORAL at 08:24

## 2021-12-29 RX ADMIN — LEVOTHYROXINE SODIUM 88 MCG: 88 TABLET ORAL at 08:23

## 2021-12-29 RX ADMIN — GABAPENTIN 100 MG: 100 CAPSULE ORAL at 17:58

## 2021-12-29 RX ADMIN — SALMETEROL XINAFOATE 1 PUFF: 50 POWDER, METERED ORAL; RESPIRATORY (INHALATION) at 20:29

## 2021-12-29 RX ADMIN — SALMETEROL XINAFOATE 1 PUFF: 50 POWDER, METERED ORAL; RESPIRATORY (INHALATION) at 08:24

## 2021-12-29 RX ADMIN — GABAPENTIN 100 MG: 100 CAPSULE ORAL at 12:58

## 2021-12-29 RX ADMIN — MEMANTINE 10 MG: 10 TABLET ORAL at 08:24

## 2021-12-29 RX ADMIN — BUSPIRONE HYDROCHLORIDE 30 MG: 15 TABLET ORAL at 08:23

## 2021-12-29 RX ADMIN — MEMANTINE 10 MG: 10 TABLET ORAL at 20:29

## 2021-12-29 RX ADMIN — CLOZAPINE 200 MG: 100 TABLET ORAL at 20:28

## 2021-12-29 RX ADMIN — ATORVASTATIN CALCIUM 80 MG: 80 TABLET, FILM COATED ORAL at 20:29

## 2021-12-29 RX ADMIN — MEGESTROL ACETATE 20 MG: 20 TABLET ORAL at 08:23

## 2021-12-29 RX ADMIN — DONEPEZIL HYDROCHLORIDE 10 MG: 10 TABLET ORAL at 20:29

## 2021-12-29 RX ADMIN — HALOPERIDOL 1 MG: 1 TABLET ORAL at 20:28

## 2021-12-29 RX ADMIN — ASPIRIN 81 MG CHEWABLE TABLET 81 MG: 81 TABLET CHEWABLE at 08:24

## 2021-12-29 RX ADMIN — MIRTAZAPINE 15 MG: 15 TABLET, FILM COATED ORAL at 20:29

## 2021-12-29 RX ADMIN — BUSPIRONE HYDROCHLORIDE 30 MG: 15 TABLET ORAL at 20:28

## 2021-12-29 ASSESSMENT — ACTIVITIES OF DAILY LIVING (ADL)
HYGIENE/GROOMING: PROMPTS;INDEPENDENT
ORAL_HYGIENE: INDEPENDENT;PROMPTS
HYGIENE/GROOMING: INDEPENDENT;PROMPTS
DRESS: PROMPTS;INDEPENDENT
DRESS: INDEPENDENT;PROMPTS
ORAL_HYGIENE: PROMPTS;INDEPENDENT
LAUNDRY: UNABLE TO COMPLETE
LAUNDRY: UNABLE TO COMPLETE

## 2021-12-29 ASSESSMENT — MIFFLIN-ST. JEOR: SCORE: 1601.45

## 2021-12-29 NOTE — PLAN OF CARE
"  Problem: Psychotic Symptoms  Goal: Psychotic Symptoms  Description: Signs and symptoms of listed problems will be absent or manageable.  Outcome: No Change     Problem: Cognitive Impairment (Psychotic Signs/Symptoms)  Goal: Optimal Cognitive Function (Psychotic Signs/Symptoms)  Outcome: No Change  Intervention: Support and Promote Cognitive Ability  Recent Flowsheet Documentation  Taken 12/29/2021 1000 by Juarez Murphy RN  Trust Relationship/Rapport:    care explained    questions encouraged    questions answered    thoughts/feelings acknowledged     Problem: Malnutrition  Goal: Improved Nutritional Intake  Outcome: No Change     Patient is calm medication compliant., affect flat and blunted. Denies mental health illness, does not appear to be responding to internal stimuli. Patient was confused this afternoon, asked writer \"When is my ride coming to pick me up\". Poor appetite today, ate 50% of breakfast and less than 25% of lunch. 600cc intake and patient is voiding in toilet. Patient did not attend groups, isolative and withdrawn. Refused ADLs.  Denies pain, weight obtained this shift.    Addendum  Aricept modified from 5mg to 10mg at bedtime.  John came to the milieu sat down with a peer and started conversing.  "

## 2021-12-29 NOTE — PLAN OF CARE
Problem: Psychotic Symptoms  Goal: Psychotic Symptoms  Description: Signs and symptoms of listed problems will be absent or manageable.  Outcome: Improving     Problem: Sleep Disturbance  Goal: Adequate Sleep/Rest  Outcome: Improving     Slept well for 9.5 hours  No intake/output this shift  Independent in repositioning in bed  No concerns/complaints made.

## 2021-12-29 NOTE — PLAN OF CARE
Assessment/Intervention/Current Symptoms and Care Coordination:    Current Symptoms include the following: Confusion, delusional thinking, irritability, oriented to only self, and to other, and poor nutrition. Pt is improving in terms of coming out in the milieu and having his food there. Still isolating.    Chart Review    Left a message for Asif regional ombudsman, mental health ombudsperson ([phone]).     UPDATE: Called [facility] ([phone]) for an update on the status of this patient's referral. Spoke with Mk who reported that there are 40 patients currently on the list. The patient has been on the list since July. Per Mk, writer should call back in a month's time. She reported the system is overwhelmed with patients on a wait-list that they are slow to taking patients from [facility].    Sent a message to the ombudsperson.    Meeting with the [facility] Attorney's Office, the  and his supervisor and this writer to clarify and problem-solve the placement challenges with this patient has been re-scheduled for Thursday, December 30, 2021 from 11:00am to 12:00noon.     Discharge Plan or Goal:  Pending stabilization & development of a safe discharge plan.  Considerations include: Nursing home. Guardianship process is being pursued.     Barriers to Discharge:  Patient requires further psychiatric stabilization due to current symptomology, Medication management with possible adjustments and guardianship     Referral Status:  No new referrals made.     Legal Status:  Patient is under MI commitment in [facility] with Yanez. 04-RD-GP-        Contacts:  Case Management Services:  [facility]   Kurtis Chapin. Phone: [phone]  Supervisor is [email or web address]     Freida Talley  [phone]

## 2021-12-30 LAB
ANION GAP SERPL CALCULATED.3IONS-SCNC: 9 MMOL/L (ref 3–14)
BASOPHILS # BLD MANUAL: 0.1 10E3/UL (ref 0–0.2)
BASOPHILS NFR BLD MANUAL: 1 %
BUN SERPL-MCNC: 13 MG/DL (ref 7–30)
BURR CELLS BLD QL SMEAR: ABNORMAL
CALCIUM SERPL-MCNC: 8.5 MG/DL (ref 8.5–10.1)
CHLORIDE BLD-SCNC: 116 MMOL/L (ref 94–109)
CO2 SERPL-SCNC: 18 MMOL/L (ref 20–32)
CREAT SERPL-MCNC: 0.63 MG/DL (ref 0.66–1.25)
EOSINOPHIL # BLD MANUAL: 1.6 10E3/UL (ref 0–0.7)
EOSINOPHIL NFR BLD MANUAL: 17 %
ERYTHROCYTE [DISTWIDTH] IN BLOOD BY AUTOMATED COUNT: 15.9 % (ref 10–15)
GFR SERPL CREATININE-BSD FRML MDRD: >90 ML/MIN/1.73M2
GLUCOSE BLD-MCNC: 93 MG/DL (ref 70–99)
HCT VFR BLD AUTO: 40.6 % (ref 40–53)
HGB BLD-MCNC: 13.7 G/DL (ref 13.3–17.7)
LYMPHOCYTES # BLD MANUAL: 1 10E3/UL (ref 0.8–5.3)
LYMPHOCYTES NFR BLD MANUAL: 11 %
MAGNESIUM SERPL-MCNC: 2.1 MG/DL (ref 1.6–2.3)
MCH RBC QN AUTO: 30.6 PG (ref 26.5–33)
MCHC RBC AUTO-ENTMCNC: 33.7 G/DL (ref 31.5–36.5)
MCV RBC AUTO: 91 FL (ref 78–100)
MONOCYTES # BLD MANUAL: 0.4 10E3/UL (ref 0–1.3)
MONOCYTES NFR BLD MANUAL: 4 %
NEUTROPHILS # BLD MANUAL: 6.2 10E3/UL (ref 1.6–8.3)
NEUTROPHILS NFR BLD MANUAL: 67 %
PHOSPHATE SERPL-MCNC: 3.9 MG/DL (ref 2.5–4.5)
PLAT MORPH BLD: ABNORMAL
PLATELET # BLD AUTO: 201 10E3/UL (ref 150–450)
POTASSIUM BLD-SCNC: 4 MMOL/L (ref 3.4–5.3)
RBC # BLD AUTO: 4.47 10E6/UL (ref 4.4–5.9)
RBC MORPH BLD: ABNORMAL
SODIUM SERPL-SCNC: 143 MMOL/L (ref 133–144)
WBC # BLD AUTO: 9.2 10E3/UL (ref 4–11)

## 2021-12-30 PROCEDURE — 82435 ASSAY OF BLOOD CHLORIDE: CPT | Performed by: PSYCHIATRY & NEUROLOGY

## 2021-12-30 PROCEDURE — 250N000013 HC RX MED GY IP 250 OP 250 PS 637: Performed by: PSYCHIATRY & NEUROLOGY

## 2021-12-30 PROCEDURE — 85027 COMPLETE CBC AUTOMATED: CPT | Performed by: PSYCHIATRY & NEUROLOGY

## 2021-12-30 PROCEDURE — 84100 ASSAY OF PHOSPHORUS: CPT | Performed by: PSYCHIATRY & NEUROLOGY

## 2021-12-30 PROCEDURE — 124N000003 HC R&B MH SENIOR/ADOLESCENT

## 2021-12-30 PROCEDURE — 36415 COLL VENOUS BLD VENIPUNCTURE: CPT | Performed by: PSYCHIATRY & NEUROLOGY

## 2021-12-30 PROCEDURE — 83735 ASSAY OF MAGNESIUM: CPT | Performed by: PSYCHIATRY & NEUROLOGY

## 2021-12-30 RX ADMIN — CLOZAPINE 200 MG: 100 TABLET ORAL at 19:43

## 2021-12-30 RX ADMIN — DONEPEZIL HYDROCHLORIDE 10 MG: 10 TABLET ORAL at 19:44

## 2021-12-30 RX ADMIN — MEMANTINE 10 MG: 10 TABLET ORAL at 19:43

## 2021-12-30 RX ADMIN — LEVOTHYROXINE SODIUM 88 MCG: 88 TABLET ORAL at 08:01

## 2021-12-30 RX ADMIN — BUSPIRONE HYDROCHLORIDE 30 MG: 15 TABLET ORAL at 08:01

## 2021-12-30 RX ADMIN — MEGESTROL ACETATE 20 MG: 20 TABLET ORAL at 08:01

## 2021-12-30 RX ADMIN — GABAPENTIN 100 MG: 100 CAPSULE ORAL at 08:01

## 2021-12-30 RX ADMIN — SALMETEROL XINAFOATE 1 PUFF: 50 POWDER, METERED ORAL; RESPIRATORY (INHALATION) at 08:00

## 2021-12-30 RX ADMIN — MEMANTINE 10 MG: 10 TABLET ORAL at 08:01

## 2021-12-30 RX ADMIN — ATORVASTATIN CALCIUM 80 MG: 80 TABLET, FILM COATED ORAL at 19:44

## 2021-12-30 RX ADMIN — ASPIRIN 81 MG CHEWABLE TABLET 81 MG: 81 TABLET CHEWABLE at 08:01

## 2021-12-30 RX ADMIN — SALMETEROL XINAFOATE 1 PUFF: 50 POWDER, METERED ORAL; RESPIRATORY (INHALATION) at 19:43

## 2021-12-30 RX ADMIN — GABAPENTIN 100 MG: 100 CAPSULE ORAL at 12:10

## 2021-12-30 RX ADMIN — HALOPERIDOL 1 MG: 1 TABLET ORAL at 19:43

## 2021-12-30 RX ADMIN — MIRTAZAPINE 15 MG: 15 TABLET, FILM COATED ORAL at 19:43

## 2021-12-30 RX ADMIN — GABAPENTIN 100 MG: 100 CAPSULE ORAL at 17:03

## 2021-12-30 RX ADMIN — BUSPIRONE HYDROCHLORIDE 30 MG: 15 TABLET ORAL at 19:43

## 2021-12-30 ASSESSMENT — ACTIVITIES OF DAILY LIVING (ADL)
DRESS: SCRUBS (BEHAVIORAL HEALTH);INDEPENDENT
ORAL_HYGIENE: INDEPENDENT
LAUNDRY: UNABLE TO COMPLETE
LAUNDRY: UNABLE TO COMPLETE
HYGIENE/GROOMING: PROMPTS;INDEPENDENT
HYGIENE/GROOMING: INDEPENDENT
ORAL_HYGIENE: PROMPTS;INDEPENDENT
DRESS: SCRUBS (BEHAVIORAL HEALTH);INDEPENDENT

## 2021-12-30 NOTE — PLAN OF CARE
Meeting today at 12:00pm:    Camacho Macario ([facility] Attorney); Becki Stephenson (DHS), Kurtis Chapin (), Amalia Mcdowell (Case ), Jaja Staley (Intern).    Camacho reported that the current  will be leaving in the summer and his successor may take a different route. Reported that a legislative action to make  to sign for guardianship to be assigned, that may be a better route for the future. It is currently not in the statute and the  said he was not going to do that because it is not in the statute. Camacho stated that they have tried everything and there has not been success. Stated the county is unable to pay, and the court is not able to provide a relief. He has gone to case management and they said this is not the kind of guardianship the county does otherwise they will be inundated with guardianships. Camacho reported that the situation is an ongoing on. Said [facility] can remedy this by pushing for legislation to allow  to sign and assign guardianship.     Becki stated she will contact her supervisor to .  [employer] [place] is the program. This program will pay for guardianship and at a higher rate. This might help with transition, temporarily.  Reported she will connect with Rachel for additional support. She said this program allows for flexibility. Becki also said she will bring up this case at the [facility] meeting and the Mental Health Advisory Committee. This Committee can also make recommendation for changes.  Camacho reported that this issue will be the priority issue they will discuss with the new . Camacho said guardianship is the number 1 problem for discharge in the [facility], above all others, from a legal perspective. Becki s supervisor will be back next week. They are seeing this situation at least 2 times a month.     For John:  Kurtis will email Rachel and [facility] DHS and writer and will fill out the application. Kurtis will ask about what to do with John not giving  permission for this application to be filled. Rachel will help problem-solve around consent and legal issues related to this.    has authority to sign releases for benefits and placements.  This is the legislative language that will be needed.    Efrain provided update from [employer]. Camacho responded that the [facility] is not interested in paying for a private guardian. Becki has been speaking with Meredith and Karla.            Introduction Email from :    Rao Ramos,    I am a Targeted  with [facility] in [facility].    I was given your contact information by Becki Stephenson regarding client John Juárez. I m not sure how familiar you are with the case. I m happy to provide additional details if needed. The primary barrier we are having to getting John from the hospital to a nursing facility is the denials from public guardians to take on John ochoa case. The common response from potential public guardian s is that the pay rate for [facility] clients is too low compared to other [facility].    Becki let me know about the [employer] [place] Program as a possible solution to help increase the reimbursement rate for a public guardian. She provided me with the link to the web site. The concern I have is that given my client s current status with dementia symptoms, it is my impression as well that of the hospital that the client is unable to give informed consent which is the reason why we are seeking a guardian for him. I m concerned about the ethics of completing an application for the program when he doesn t consent and/or doesn t understand what the program is that he would be signing up for. Becki recommended that I reach out to you about this concern. She mentioned that you may have experience working with similar situations in regards to completing the application for this program.    VALERI Etienne,    (direct)[phone] (fax) [phone] (cell) [phone]

## 2021-12-30 NOTE — PLAN OF CARE
Problem: Sleep Disturbance  Goal: Adequate Sleep/Rest  Outcome: No Change     Patient slept a total of 9.75 hours without any interruptions. No concerns raised the whole shift.

## 2021-12-30 NOTE — PLAN OF CARE
"  Problem: Behavioral Health Plan of Care  Goal: Optimized Coping Skills in Response to Life Stressors  Outcome: No Change     Problem: Psychotic Symptoms  Goal: Psychotic Symptoms  Description: Signs and symptoms of listed problems will be absent or manageable.  Outcome: No Change    Pt observed isolative and withdrawn this shift. He spent most of his time in his room. Encouraged to be in the milieu. Pt was out in the milieu, watching television with peers and had his dinner. Present with blunted affect, in a calm mood. Described his day as \"ok\". Pt denies SI/SIB/HI. Denies experiencing any type of hallucinations. Denies having anxiety and depression. Denies having pain. Pt exhibiting confusion, claimed he will be picked up by someone tonight in his house. Verbalized \"When are they going to pick me up tonight in my house?\" Pt was reoriented to place.     Consumed 80% of share of dinner and took approximately 750 ml of fluids. Pt voiding in toilet.   Due medications given. Denies experiencing side effects.  Needs attended to. On cheeking, elopement, fall and assault precautions. Staff will continue to monitor. No further concerns noted as of this time.    "

## 2021-12-30 NOTE — PROGRESS NOTES
PSYCHIATRIC PROGRESS NOTE    Admission Date 06/30/2021  Date of Service: 12/29/2021    The patient was seen, his chart reviewed, his case discussed with staff at the Team Meeting. He has been coming out of his room relatively more often and appeared to be more verbal and easier to understand.  He has been medication compliant and denied any side effects of recently started Aricept.    This is a 55-year-old single white male with a lifelong history of mental illness, TBI, neurocognitive disorder and polysubstance abuse who had spent several months at [facility] and was transferred here after being seen by Dr. Wilks for psychiatric consultation. He is committed and Jarvised. His scheduled Risperdal was discontinued and he was started on Clozaril since his Yanez was amended. Subsequently, he was started on Namenda and Exelon Patch. His Clozaril dose was slightly increased. He seems to have tolerated his medications well. His Clozaril levels were therapeutic but became too high at the end of October and his Clozaril dose was decreased. Since I saw him in October BuSpar and Remeron were added to his regimen as well as Gabapentin and a small dose of Haldol was added at bedtime. His Exelon Patch was discontinued mainly because of his refusal to take it. When I saw him last time I started him on Aricept.   .   MEDICATIONS, psychotropic   Aricept 5 mg at bedtime   Clozaril 200 mg at bedtime   Haldol 1 mg at bedtime   Gabapentin 100 mg TID   Buspar 30 mg BID   Remeron 15 mg at bedtime   Namenda 10 mg BID   Risperdal M-Tab 1 mg Q6H PRN   Melatonin 3 mg HS PRN     LABS: His Glucose by meter was 100 on 12/28/21     VS: Pulse - 102, BP - 107//76, T - 97.3, Resp - 16, SpO2 - 97%     MENTAL STATUS EXAMINATION   Revealed a normally built and normally developed, disheveled and unkempt 55-year-old white male appearing older than his stated age. He presented with involuntary tongue movements. He appeared  to be more alert and more verbal. His affect was blunted. He denied SI and HI. No overt psychotic features were noted or elicited. He had no insight into his problems and his judgement was impaired. He continued to present with marked impairment of his memory and cognition.     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

## 2021-12-30 NOTE — PLAN OF CARE
"CTC: Recommendations from the Regional Ombudsperson:    \"I am just wondering, you mentioned in one of your voicemails that he is under a committment. Why can the commitment CM not make the ok to discharge him to that level of care as a provissional discharge? I would think they would be able to. Other resources that I would suggest would be [employer] to see if they would have any suggestions. Not sure what else can be done in terms of guardianship if no one will step to the plate to take the client on. Would the county be willing, since he is on a commitment to pay for a private business guardian?\"    [email or web address]         Thanks,          Asif  "

## 2021-12-30 NOTE — PLAN OF CARE
Problem: Psychotic Symptoms  Goal: Psychotic Symptoms  Description: Signs and symptoms of listed problems will be absent or manageable.  Outcome: No Change     Patient is still isolative and withdrawn. He comes to the dining area for meals but goes back to his room right away after he is done. He is calm but with flat and blunt affect. He denies SI/SIB nor hallucinations. His verbal responses are just too short. Not heard he wants to leave the unit. He is med compliant. Encouraged to drink more liquids. No side effects of medications reported/observed.  He remains not attending groups. He looks disheveled and untidy. Refused to take a shower. He only ate 50% of his breakfast and 25% of his lunch. /70; P-86.

## 2021-12-30 NOTE — PLAN OF CARE
Problem: General Rehab Plan of Care  Goal: Therapeutic Recreation/Music Therapy Goal  Description: The patient and/or their representative will achieve their patient-specific goals related to the plan of care.  The patient-specific goals include:  Outcome: Pranav Lopez attended art therapy group for 1 hour (5 patients total). He appeared disheveled. He did not talk or respond to questions, but appeared to be hearing and understanding others. He followed directions to make a self-compassion collage and worked independently. He chose several images of lakes and fishing, which is an interest of his. He perked up and appeared interested when peers began talking about fishing, but again he didn't engage or respond when a female peer asked him directly if he fished. He appeared calm and focused, placing the images carefully to fit them all on the page without overlapping. He showed his collage to writer and appeared pleased with it.

## 2021-12-31 PROCEDURE — 124N000003 HC R&B MH SENIOR/ADOLESCENT

## 2021-12-31 PROCEDURE — 250N000013 HC RX MED GY IP 250 OP 250 PS 637: Performed by: PSYCHIATRY & NEUROLOGY

## 2021-12-31 RX ADMIN — CLOZAPINE 200 MG: 100 TABLET ORAL at 19:45

## 2021-12-31 RX ADMIN — DONEPEZIL HYDROCHLORIDE 10 MG: 10 TABLET ORAL at 19:46

## 2021-12-31 RX ADMIN — BUSPIRONE HYDROCHLORIDE 30 MG: 15 TABLET ORAL at 07:54

## 2021-12-31 RX ADMIN — MEGESTROL ACETATE 20 MG: 20 TABLET ORAL at 07:55

## 2021-12-31 RX ADMIN — SALMETEROL XINAFOATE 1 PUFF: 50 POWDER, METERED ORAL; RESPIRATORY (INHALATION) at 07:49

## 2021-12-31 RX ADMIN — LEVOTHYROXINE SODIUM 88 MCG: 88 TABLET ORAL at 07:55

## 2021-12-31 RX ADMIN — MEMANTINE 10 MG: 10 TABLET ORAL at 07:55

## 2021-12-31 RX ADMIN — ASPIRIN 81 MG CHEWABLE TABLET 81 MG: 81 TABLET CHEWABLE at 07:55

## 2021-12-31 RX ADMIN — BUSPIRONE HYDROCHLORIDE 30 MG: 15 TABLET ORAL at 19:45

## 2021-12-31 RX ADMIN — GABAPENTIN 100 MG: 100 CAPSULE ORAL at 12:11

## 2021-12-31 RX ADMIN — SALMETEROL XINAFOATE 1 PUFF: 50 POWDER, METERED ORAL; RESPIRATORY (INHALATION) at 19:45

## 2021-12-31 RX ADMIN — HALOPERIDOL 1 MG: 1 TABLET ORAL at 19:46

## 2021-12-31 RX ADMIN — MIRTAZAPINE 15 MG: 15 TABLET, FILM COATED ORAL at 19:46

## 2021-12-31 RX ADMIN — GABAPENTIN 100 MG: 100 CAPSULE ORAL at 07:55

## 2021-12-31 RX ADMIN — ATORVASTATIN CALCIUM 80 MG: 80 TABLET, FILM COATED ORAL at 19:46

## 2021-12-31 RX ADMIN — GABAPENTIN 100 MG: 100 CAPSULE ORAL at 17:23

## 2021-12-31 RX ADMIN — MEMANTINE 10 MG: 10 TABLET ORAL at 19:45

## 2021-12-31 ASSESSMENT — ACTIVITIES OF DAILY LIVING (ADL)
DRESS: SCRUBS (BEHAVIORAL HEALTH)
HYGIENE/GROOMING: PROMPTS;INDEPENDENT
LAUNDRY: UNABLE TO COMPLETE
ORAL_HYGIENE: PROMPTS;INDEPENDENT
ORAL_HYGIENE: PROMPTS;INDEPENDENT
DRESS: SCRUBS (BEHAVIORAL HEALTH);INDEPENDENT
HYGIENE/GROOMING: PROMPTS;INDEPENDENT
LAUNDRY: UNABLE TO COMPLETE

## 2021-12-31 NOTE — PLAN OF CARE
Problem: Cognitive Impairment (Psychotic Signs/Symptoms)  Goal: Optimal Cognitive Function (Psychotic Signs/Symptoms)  Outcome: No Change  Intervention: Support and Promote Cognitive Ability  Recent Flowsheet Documentation  Taken 12/31/2021 1100 by Juarez Murphy RN  Trust Relationship/Rapport: care explained     Problem: Behavioral Disturbance  Goal: Behavioral Disturbance  Description: Signs and symptoms of listed problems will be absent or manageable by discharge or transition of care.  Outcome: No Change     Problem: Malnutrition  Goal: Improved Nutritional Intake  Outcome: No Change     Patient was irritable this morning, came out for breakfast and became frustrated about staff checking vitals prior to eating, did not eat anything on tray and left abruptly. Patient ate 100% of lunch. Did not attend group, isolative and withdrawn. Medication compliant, denies mental health illness, does not appear to be responding to internal stimuli. Refused ADLs.

## 2021-12-31 NOTE — PLAN OF CARE
Problem: Sleep Disturbance  Goal: Adequate Sleep/Rest  Outcome: No Change     Patient slept a total of 9.75 hours. No concerns raised the whole night.

## 2021-12-31 NOTE — PROGRESS NOTES
PSYCHIATRIC PROGRESS NOTE    Admission Date: 06/30/2021  Date of Service: 12/31/2021    The patient was seen, his chart reviewed, his case discussed with staff.  He has been relatively more active coming out of his room more often and spending time in the lounge without socializing with peers or staff. He continued to stay by himself. He is relatively more verbal upon approach but still  having a difficult time providing coherent self-assessment. He has been medication compliant.    John is a 55-year-old single white male with a lifelong history of mental illness, TBI, neurocognitive disorder and polysubstance abuse who had spent several months at [facility] and was transferred here after being seen by Dr. Wilks for psychiatric consultation. He is committed and Jarvised. His scheduled Risperdal was discontinued and he was started on Clozaril since his Yanez was amended. Subsequently, he was started on Namenda and Exelon Patch. His Clozaril dose was slightly increased. He seems to have tolerated his medications well. His Clozaril levels were therapeutic but became too high at the end of October and his Clozaril dose was decreased. Since I saw him in October BuSpar and Remeron were added to his regimen as well as Gabapentin and a small dose of Haldol was added at bedtime. His Exelon Patch was discontinued mainly because of his refusal to take it. I started him on Aricept and recently optimized its dose..   .   MEDICATIONS, psychotropic   Aricept 10 mg at bedtime   Clozaril 200 mg at bedtime   Haldol 1 mg at bedtime   Gabapentin 100 mg TID   Buspar 30 mg BID   Remeron 15 mg at bedtime   Namenda 10 mg BID   Risperdal M-Tab 1 mg Q6H PRN   Melatonin 3 mg HS PRN     LABS: His CBC on 12/30/21 generally WNL. His Blood Chemistry was marked by elevated Chloride at 116, low Creatinine at 0.63 and low Carbon Dioxide of 18.    VS:Pulse - 106, BP - 109/80, T - 96.8, Resp - 16, SpO2 - 98%    MENTAL  STATUS EXAMINATION   Revealed a normally built and normally developed, disheveled and unkempt 55-year-old white male appearing older than his stated age. He presented with involuntary tongue movements. He appeared to be more alert and more verbal. His affect was blunted. He denied SI and HI. No overt psychotic features were noted or elicited. He had no insight into his problems and his judgement was impaired. He continued to present with marked impairment of his memory and cognition.     DIAGNOSTIC IMPRESSION   Schizophrenia Chronic, undifferentiated type   Neurocognitive Disorder secondary to TBI   Tardive Dyskinesia   Alcohol Use Disorder, in controlled environment   Stimulant Use Disorder, in controlled environment     PLAN: Continue medications without change    Juarez Nickerson MD

## 2021-12-31 NOTE — PLAN OF CARE
"Patient has remained isolative to his room. He came out for snacks and dinner only. He ate 100% of his snack eating 2 nutri grain bars, pudding and cookie. He did not eat dinner. His fluid intake was 880 ml. He voids without difficulty. His affect is flat. His mood is calm. He denies SI and SIB. He denies all MH symptoms answering \"no\" without elaboration. He is alert to self only. He mumbles when he speaks. He is not social with peers and staff unless spoken to. He did not attend groups. He refused to take a shower. He denies pain. He is pleasant and cooperative. He is medication compliant. Plan is to find guardianship for patient which has not happened thus far.   "

## 2022-01-01 PROCEDURE — 124N000003 HC R&B MH SENIOR/ADOLESCENT

## 2022-01-01 PROCEDURE — 250N000013 HC RX MED GY IP 250 OP 250 PS 637: Performed by: PSYCHIATRY & NEUROLOGY

## 2022-01-01 RX ADMIN — SALMETEROL XINAFOATE 1 PUFF: 50 POWDER, METERED ORAL; RESPIRATORY (INHALATION) at 21:10

## 2022-01-01 RX ADMIN — BUSPIRONE HYDROCHLORIDE 30 MG: 15 TABLET ORAL at 08:11

## 2022-01-01 RX ADMIN — GABAPENTIN 100 MG: 100 CAPSULE ORAL at 08:11

## 2022-01-01 RX ADMIN — SALMETEROL XINAFOATE 1 PUFF: 50 POWDER, METERED ORAL; RESPIRATORY (INHALATION) at 08:11

## 2022-01-01 RX ADMIN — MEGESTROL ACETATE 20 MG: 20 TABLET ORAL at 08:11

## 2022-01-01 RX ADMIN — CLOZAPINE 200 MG: 100 TABLET ORAL at 21:11

## 2022-01-01 RX ADMIN — GABAPENTIN 100 MG: 100 CAPSULE ORAL at 17:08

## 2022-01-01 RX ADMIN — MIRTAZAPINE 15 MG: 15 TABLET, FILM COATED ORAL at 21:11

## 2022-01-01 RX ADMIN — LEVOTHYROXINE SODIUM 88 MCG: 88 TABLET ORAL at 08:11

## 2022-01-01 RX ADMIN — MEMANTINE 10 MG: 10 TABLET ORAL at 21:11

## 2022-01-01 RX ADMIN — BUSPIRONE HYDROCHLORIDE 30 MG: 15 TABLET ORAL at 21:10

## 2022-01-01 RX ADMIN — ATORVASTATIN CALCIUM 80 MG: 80 TABLET, FILM COATED ORAL at 21:10

## 2022-01-01 RX ADMIN — GABAPENTIN 100 MG: 100 CAPSULE ORAL at 12:20

## 2022-01-01 RX ADMIN — MEMANTINE 10 MG: 10 TABLET ORAL at 08:11

## 2022-01-01 RX ADMIN — ASPIRIN 81 MG CHEWABLE TABLET 81 MG: 81 TABLET CHEWABLE at 08:11

## 2022-01-01 RX ADMIN — HALOPERIDOL 1 MG: 1 TABLET ORAL at 21:11

## 2022-01-01 RX ADMIN — DONEPEZIL HYDROCHLORIDE 10 MG: 10 TABLET ORAL at 21:11

## 2022-01-01 ASSESSMENT — ACTIVITIES OF DAILY LIVING (ADL)
HYGIENE/GROOMING: PROMPTS;INDEPENDENT
LAUNDRY: UNABLE TO COMPLETE
DRESS: SCRUBS (BEHAVIORAL HEALTH);PROMPTS
HYGIENE/GROOMING: PROMPTS;INDEPENDENT
LAUNDRY: UNABLE TO COMPLETE
ORAL_HYGIENE: INDEPENDENT;PROMPTS
DRESS: SCRUBS (BEHAVIORAL HEALTH);INDEPENDENT

## 2022-01-01 NOTE — PLAN OF CARE
"Patient is alert to self, isolative and withdrawn in his room. Came to the lounge for breakfast, ate about 50% of his meal and went back to his room right after. Refused to eat lunch stated \"I am not hungry\" lunch provided to his room, ate 25% of his lunch and consume one ensure. Has flat/blunted affect. His mood is calm. Denies SI, SIB and hallucination. Denies anxiety. He stated that his depression is high related to being here so long, he said \"Get me out off here\"  Total fluid intake during this shift was 1560 ml. He is not social with peers and staff. Medication complaint. Refused his Covid test. Not attending group. His sleep is good. Continue to monitor patient as plan of care.   "

## 2022-01-01 NOTE — PLAN OF CARE
"Patient has remained isolative to his room. He came out for snacks and dinner only. He ate 100% of his snack eating 2 nutri grain bars.  He ate 75% of dinner. His fluid intake was 886 ml. He voids without difficulty. His affect is flat. His mood is calm for the most part.  He denies SI and SIB. He denies all MH symptoms answering \"no\" without elaboration. He is alert to self only. He mumbles when he speaks. He is not social with peers and staff unless spoken to. He did not attend groups. He refused to take a shower. He denies pain. He refused Covid swab and became belligerent yelling \"I'm not taking no fuckin COVID test, I don't have fuckin COVID, I hate how people nag me\".  He is medication compliant. Plan is to find guardianship for patient which has not happened thus far.           "

## 2022-01-01 NOTE — PLAN OF CARE
Problem: Sleep Disturbance  Goal: Adequate Sleep/Rest  Outcome: No Change      Patient slept a total of  9.5 hours without any interruptions. Intake -0. Output-0. No concerns raised the whole night.

## 2022-01-01 NOTE — PLAN OF CARE
BEHAVIORAL TEAM DISCUSSION     Participants:Juarez Nickerson MD; Juarez Murphy, RN; COLETTE Leonkelley [facility]; Joi Stearns OT.     Progress: Pt was transferred from [facility]. Pt is being engaged in the treatment process. He is observed to be a bit more responsive to prompting, in terms of eating his meal and bathing, minimally. Pt has continued to display symptoms of confusion, hallucination. His food intake and hygiene is also a concern.   Medical work- up has not yielded an origin of his malaise. Pt is relatively more active on the unit. He is coming out and watching television. He seems to be irritated when asked to complete a task, for example, taking his blood pressure while he is eating.      Anticipated length of stay: Pt is showing mild signs of improvement. He is able to come out and stay in the lounge occassionally. Yesterday, he sat and watched television on the lounge.  Unknown at this time. A report has been made with the Ombudsman's office seeking guidance on way forward regarding this patient's need to have a guardian to make his healthcare decisions.     Continued Stay Criteria/Rationale:   The pt is not able to care for himself. He is on a mental health commitment and pittman. It has been difficult to secure a guardian for him. Has no guardian. A meeting was completed with the 's office, the DHS office,  and case management on the 30th of December to review the case. Report was also provided in terms of response from the Ombudsperson's office. Pt is on a waitlist with the CABS. He is not priority per CABS.     Medical/Physical: See medical for details  Needs 2 staff to transfer  Staff no longer needing to feed the patient  Patient is no longer on SIO     Precautions:           Behavioral Orders   Procedures     Assault precautions     Cheeking Precautions (behavioral units)       Patient Observed swallowing PO medications; Patient asked to drink water after swallowing medication; Patient in  Staff line of sight for 15 minutes after medication given; Mouth checks after PO administration (patient asked to open mouth and stick out their tongue).     Code 1     Code 2       With Security for any imaging/testing needed.     Elopement precautions     Fall precautions     Routine Programming       As clinically indicated     Single Room     Status 15       Every 15 minutes.      Plan:   Help pt eat  Medication Management  Ethics consultations as needed  Medications:MEDICATIONS, psychotropic   Aricept 10 mg at bedtime   Clozaril 200 mg at bedtime   Haldol 1 mg at bedtime   Gabapentin 100 mg TID   Buspar 30 mg BID   Remeron 15 mg at bedtime   Namenda 10 mg BID   Risperdal M-Tab 1 mg Q6H PRN   Melatonin 3 mg HS PRN      LABS: His CBC on 12/30/21 generally WNL. His Blood Chemistry was marked by elevated Chloride at 116, low Creatinine at 0.63 and low Carbon Dioxide of 18.     VS:Pulse - 106, BP - 109/80, T - 96.8, Resp - 16, SpO2 - 98%   Pt will be encouraged to engaged in the therapeutic milieu  Pt will be encouraged to attend groups to learn and practice positive skills to cope with his symptoms  Medical consultations will be implemented as needed  Care coordination will be implemented.

## 2022-01-02 PROCEDURE — 250N000013 HC RX MED GY IP 250 OP 250 PS 637: Performed by: PSYCHIATRY & NEUROLOGY

## 2022-01-02 PROCEDURE — 124N000003 HC R&B MH SENIOR/ADOLESCENT

## 2022-01-02 PROCEDURE — 99207 PR NO CHARGE LOS: CPT | Performed by: PHYSICIAN ASSISTANT

## 2022-01-02 RX ORDER — DIPHENHYDRAMINE HYDROCHLORIDE 50 MG/ML
25 INJECTION INTRAMUSCULAR; INTRAVENOUS EVERY 6 HOURS PRN
Status: DISCONTINUED | OUTPATIENT
Start: 2022-01-02 | End: 2022-01-18 | Stop reason: HOSPADM

## 2022-01-02 RX ORDER — LORAZEPAM 0.5 MG/1
0.5 TABLET ORAL EVERY 6 HOURS PRN
Status: DISCONTINUED | OUTPATIENT
Start: 2022-01-02 | End: 2022-01-02

## 2022-01-02 RX ORDER — LORAZEPAM 2 MG/ML
0.5 INJECTION INTRAMUSCULAR EVERY 6 HOURS PRN
Status: DISCONTINUED | OUTPATIENT
Start: 2022-01-02 | End: 2022-01-18 | Stop reason: HOSPADM

## 2022-01-02 RX ORDER — DIPHENHYDRAMINE HYDROCHLORIDE 50 MG/ML
25 INJECTION INTRAMUSCULAR; INTRAVENOUS EVERY 6 HOURS PRN
Status: DISCONTINUED | OUTPATIENT
Start: 2022-01-02 | End: 2022-01-02

## 2022-01-02 RX ORDER — HALOPERIDOL 5 MG/ML
2 INJECTION INTRAMUSCULAR EVERY 6 HOURS PRN
Status: DISCONTINUED | OUTPATIENT
Start: 2022-01-02 | End: 2022-01-18 | Stop reason: HOSPADM

## 2022-01-02 RX ORDER — HALOPERIDOL 5 MG/ML
2 INJECTION INTRAMUSCULAR EVERY 6 HOURS PRN
Status: DISCONTINUED | OUTPATIENT
Start: 2022-01-02 | End: 2022-01-02

## 2022-01-02 RX ADMIN — CLOZAPINE 200 MG: 100 TABLET ORAL at 19:14

## 2022-01-02 RX ADMIN — BUSPIRONE HYDROCHLORIDE 30 MG: 15 TABLET ORAL at 19:14

## 2022-01-02 RX ADMIN — RISPERIDONE 1 MG: 1 TABLET, ORALLY DISINTEGRATING ORAL at 15:29

## 2022-01-02 RX ADMIN — DONEPEZIL HYDROCHLORIDE 10 MG: 10 TABLET ORAL at 19:14

## 2022-01-02 RX ADMIN — MIRTAZAPINE 15 MG: 15 TABLET, FILM COATED ORAL at 19:14

## 2022-01-02 RX ADMIN — GABAPENTIN 100 MG: 100 CAPSULE ORAL at 09:02

## 2022-01-02 RX ADMIN — ATORVASTATIN CALCIUM 80 MG: 80 TABLET, FILM COATED ORAL at 19:14

## 2022-01-02 RX ADMIN — MEGESTROL ACETATE 20 MG: 20 TABLET ORAL at 09:02

## 2022-01-02 RX ADMIN — LEVOTHYROXINE SODIUM 88 MCG: 88 TABLET ORAL at 09:02

## 2022-01-02 RX ADMIN — ASPIRIN 81 MG CHEWABLE TABLET 81 MG: 81 TABLET CHEWABLE at 09:02

## 2022-01-02 RX ADMIN — GABAPENTIN 100 MG: 100 CAPSULE ORAL at 17:09

## 2022-01-02 RX ADMIN — GABAPENTIN 100 MG: 100 CAPSULE ORAL at 11:35

## 2022-01-02 RX ADMIN — SALMETEROL XINAFOATE 1 PUFF: 50 POWDER, METERED ORAL; RESPIRATORY (INHALATION) at 09:16

## 2022-01-02 RX ADMIN — SALMETEROL XINAFOATE 1 PUFF: 50 POWDER, METERED ORAL; RESPIRATORY (INHALATION) at 19:14

## 2022-01-02 RX ADMIN — MEMANTINE 10 MG: 10 TABLET ORAL at 19:14

## 2022-01-02 RX ADMIN — BUSPIRONE HYDROCHLORIDE 30 MG: 15 TABLET ORAL at 11:35

## 2022-01-02 RX ADMIN — HALOPERIDOL 1 MG: 1 TABLET ORAL at 19:14

## 2022-01-02 RX ADMIN — MEMANTINE 10 MG: 10 TABLET ORAL at 09:02

## 2022-01-02 ASSESSMENT — ACTIVITIES OF DAILY LIVING (ADL)
ORAL_HYGIENE: INDEPENDENT;PROMPTS
LAUNDRY: UNABLE TO COMPLETE
HYGIENE/GROOMING: INDEPENDENT;PROMPTS
DRESS: SCRUBS (BEHAVIORAL HEALTH);INDEPENDENT

## 2022-01-02 ASSESSMENT — MIFFLIN-ST. JEOR: SCORE: 1595.1

## 2022-01-02 NOTE — PLAN OF CARE
Patient came to the [facility] several times this shift. He watched TV. His is not social with peers and staff. His affect is flat. His mood is calm. He denies SI and SIB. He denies all MH symptoms. He denies pain. He ate 75% of his dinner and 100% of his snack. He has good fluid intake. He refused to shower. He refused Covid test. His sleep and appetite are good. He remains withdrawn. He mumbles when he speaks. He is medication compliant. He is alert to self only.

## 2022-01-02 NOTE — PLAN OF CARE
Problem: Sleep Disturbance  Goal: Adequate Sleep/Rest  Outcome: No Change     Patient slept a  total of 11 hours without interruptions. No complaints noted the whole shift.  Intake-0. Output-0.

## 2022-01-02 NOTE — PLAN OF CARE
Patient has experienced a largely positive day on Jacinto 3A.  He reports absence of all suicidal ideation while on jacinto.  He remained quiet and of minimal words throughout the day.  A propos of nothing, patient in a hostile manner placed hands on female RN (see relevant progress note) With encouragement, he walked independently to his room. He was offered and, with profanity-laden growl and yell directed at this worker, refused PRN oral risperidone.   Provider notified.  Will continue to monitor as prudent.

## 2022-01-02 NOTE — PROGRESS NOTES
"At 3:30 when this writer came out of report at change of shift I approached patient and told him I was giving him medication to calm him. Patient stated \"ok\" put his hand out and took the medication. He asked for pudding, ate the pudding and went to his room. He has been calm and cooperative thus far.  "

## 2022-01-02 NOTE — PROGRESS NOTES
Internal Medicine has been following Mr. Juárez for the past 5 weeks since his ethics discussion to ensure his labs remains stable, which they have, despite his poor eating habits. We will plan to sign off at this time but if there are any new concerns that arise, please do not hesitate to contact us back.     ASH Hanks

## 2022-01-02 NOTE — PROGRESS NOTES
"At 3pm patient came out of his room to the dinning area. He was offered a chair to sit. Patient had flat affect and was repeating \"call the police, call the police\". When redirected to the situation by the writer and asked the reason he wanted the police to be called he stated \"Because I am held here against my will and Bridgette is fucking bitch\". Writer reassured patient that he is on safe place and attempted to re-orient him to situation he jumped from his chair making few fast steps towards the writer and grabbed her forearms.  Writer stepped back, patient released her arms, at the same time the rest of the staff assured safety and redirected the patient to his room. Patient affect and face expression did not change the whole time he appeared flat at the same time tense and silently agitated.   "

## 2022-01-03 LAB
ANION GAP SERPL CALCULATED.3IONS-SCNC: 9 MMOL/L (ref 3–14)
BUN SERPL-MCNC: 15 MG/DL (ref 7–30)
CALCIUM SERPL-MCNC: 8.8 MG/DL (ref 8.5–10.1)
CHLORIDE BLD-SCNC: 115 MMOL/L (ref 94–109)
CO2 SERPL-SCNC: 19 MMOL/L (ref 20–32)
CREAT SERPL-MCNC: 0.62 MG/DL (ref 0.66–1.25)
GFR SERPL CREATININE-BSD FRML MDRD: >90 ML/MIN/1.73M2
GLUCOSE BLD-MCNC: 99 MG/DL (ref 70–99)
MAGNESIUM SERPL-MCNC: 2.1 MG/DL (ref 1.6–2.3)
PHOSPHATE SERPL-MCNC: 3.7 MG/DL (ref 2.5–4.5)
POTASSIUM BLD-SCNC: 3.9 MMOL/L (ref 3.4–5.3)
SODIUM SERPL-SCNC: 143 MMOL/L (ref 133–144)

## 2022-01-03 PROCEDURE — 82374 ASSAY BLOOD CARBON DIOXIDE: CPT | Performed by: PSYCHIATRY & NEUROLOGY

## 2022-01-03 PROCEDURE — 83735 ASSAY OF MAGNESIUM: CPT | Performed by: PSYCHIATRY & NEUROLOGY

## 2022-01-03 PROCEDURE — 82435 ASSAY OF BLOOD CHLORIDE: CPT | Performed by: PSYCHIATRY & NEUROLOGY

## 2022-01-03 PROCEDURE — 36415 COLL VENOUS BLD VENIPUNCTURE: CPT | Performed by: PSYCHIATRY & NEUROLOGY

## 2022-01-03 PROCEDURE — 84100 ASSAY OF PHOSPHORUS: CPT | Performed by: PSYCHIATRY & NEUROLOGY

## 2022-01-03 PROCEDURE — 250N000013 HC RX MED GY IP 250 OP 250 PS 637: Performed by: PSYCHIATRY & NEUROLOGY

## 2022-01-03 PROCEDURE — 124N000003 HC R&B MH SENIOR/ADOLESCENT

## 2022-01-03 PROCEDURE — 99232 SBSQ HOSP IP/OBS MODERATE 35: CPT | Performed by: PSYCHIATRY & NEUROLOGY

## 2022-01-03 RX ADMIN — ATORVASTATIN CALCIUM 80 MG: 80 TABLET, FILM COATED ORAL at 20:23

## 2022-01-03 RX ADMIN — ASPIRIN 81 MG CHEWABLE TABLET 81 MG: 81 TABLET CHEWABLE at 08:54

## 2022-01-03 RX ADMIN — LEVOTHYROXINE SODIUM 88 MCG: 88 TABLET ORAL at 08:54

## 2022-01-03 RX ADMIN — HALOPERIDOL 1 MG: 1 TABLET ORAL at 20:23

## 2022-01-03 RX ADMIN — BUSPIRONE HYDROCHLORIDE 30 MG: 15 TABLET ORAL at 20:23

## 2022-01-03 RX ADMIN — MEGESTROL ACETATE 20 MG: 20 TABLET ORAL at 08:54

## 2022-01-03 RX ADMIN — GABAPENTIN 100 MG: 100 CAPSULE ORAL at 08:54

## 2022-01-03 RX ADMIN — SALMETEROL XINAFOATE 1 PUFF: 50 POWDER, METERED ORAL; RESPIRATORY (INHALATION) at 08:50

## 2022-01-03 RX ADMIN — SALMETEROL XINAFOATE 1 PUFF: 50 POWDER, METERED ORAL; RESPIRATORY (INHALATION) at 20:24

## 2022-01-03 RX ADMIN — MIRTAZAPINE 15 MG: 15 TABLET, FILM COATED ORAL at 20:23

## 2022-01-03 RX ADMIN — BUSPIRONE HYDROCHLORIDE 30 MG: 15 TABLET ORAL at 08:54

## 2022-01-03 RX ADMIN — DONEPEZIL HYDROCHLORIDE 10 MG: 10 TABLET ORAL at 20:23

## 2022-01-03 RX ADMIN — GABAPENTIN 100 MG: 100 CAPSULE ORAL at 17:29

## 2022-01-03 RX ADMIN — GABAPENTIN 100 MG: 100 CAPSULE ORAL at 12:34

## 2022-01-03 RX ADMIN — MEMANTINE 10 MG: 10 TABLET ORAL at 20:23

## 2022-01-03 RX ADMIN — MEMANTINE 10 MG: 10 TABLET ORAL at 08:54

## 2022-01-03 RX ADMIN — CLOZAPINE 200 MG: 100 TABLET ORAL at 20:23

## 2022-01-03 ASSESSMENT — ACTIVITIES OF DAILY LIVING (ADL)
DRESS: SCRUBS (BEHAVIORAL HEALTH);PROMPTS
DRESS: SCRUBS (BEHAVIORAL HEALTH);INDEPENDENT
ORAL_HYGIENE: PROMPTS
HYGIENE/GROOMING: PROMPTS
LAUNDRY: UNABLE TO COMPLETE
ORAL_HYGIENE: PROMPTS
HYGIENE/GROOMING: PROMPTS

## 2022-01-03 NOTE — PLAN OF CARE
Problem: Behavioral Disturbance  Goal: Behavioral Disturbance  Description: Signs and symptoms of listed problems will be absent or manageable by discharge or transition of care.  Outcome: No Change  Flowsheets (Taken 1/3/2022 1140)  Behavioral Disturbance Assessed: all  Behavioral Disturbance Present:    mood    affect    speech    insight    memory deficits     Problem: Fall Injury Risk  Goal: Absence of Fall and Fall-Related Injury  Intervention: Promote Injury-Free Environment  Recent Flowsheet Documentation  Taken 1/3/2022 1135 by Una Gaytan RN  Safety Promotion/Fall Prevention: nonskid shoes/slippers when out of bed    Pt was cooperative with coming out for breakfast this AM. Pt ate approximately 25% of his breakfast and returned to his room promptly after this. Pt accepted all of his AM medications without issue. Pt is oriented only to himself. Pt voiced month as February, date as the 15th, day of the week as Thursday, year as 1999 and that he was at [facility].  Pt continues to need encouragement and prompts to eat/drink. Pt has been continent of urine all shift. Pt was prompted to change his clothing and was independent with jeanie care with prompts. Pt declined to rinse his mouth.  Pt asked writer this PM when he could go home.

## 2022-01-03 NOTE — PROGRESS NOTES
"[facility], [place]   Psychiatric Progress Note        Interim History:   The patient's care was discussed with the treatment team during the daily team meeting and/or staff's chart notes were reviewed.  Staff report patient has been eating better. Is oriented only to self. Overall is much improved. Did grab RN by the forearms but was \"more surprising than threatening.\"     The patient reports that he is doing well. Does say that he had a \"good weekend.\" Sleeping and eating well. Denies problems with mood. Denies SI. Declines to shower today but may do so tomorrow.          Medications:       aspirin  81 mg Oral Daily     atorvastatin  80 mg Oral At Bedtime     busPIRone  30 mg Oral BID     [Held by provider] cholecalciferol  1,250 mcg Oral Q7 Days     cloZAPine  200 mg Oral At Bedtime     donepezil  10 mg Oral At Bedtime     gabapentin  100 mg Oral TID w/meals     haloperidol  1 mg Oral At Bedtime     levothyroxine  88 mcg Oral Daily     megestrol  20 mg Oral Daily     memantine  10 mg Oral BID     [Held by provider] metoprolol tartrate  25 mg Oral BID     mirtazapine  15 mg Oral At Bedtime     salmeterol  1 puff Inhalation BID          Allergies:     Allergies   Allergen Reactions     Ibuprofen      Latex           Labs:     Recent Results (from the past 24 hour(s))   Basic metabolic panel    Collection Time: 01/03/22  8:17 AM   Result Value Ref Range    Sodium 143 133 - 144 mmol/L    Potassium 3.9 3.4 - 5.3 mmol/L    Chloride 115 (H) 94 - 109 mmol/L    Carbon Dioxide (CO2) 19 (L) 20 - 32 mmol/L    Anion Gap 9 3 - 14 mmol/L    Urea Nitrogen 15 7 - 30 mg/dL    Creatinine 0.62 (L) 0.66 - 1.25 mg/dL    Calcium 8.8 8.5 - 10.1 mg/dL    Glucose 99 70 - 99 mg/dL    GFR Estimate >90 >60 mL/min/1.73m2   Magnesium    Collection Time: 01/03/22  8:17 AM   Result Value Ref Range    Magnesium 2.1 1.6 - 2.3 mg/dL   Phosphorus    Collection Time: 01/03/22  8:17 AM   Result Value Ref Range    " Phosphorus 3.7 2.5 - 4.5 mg/dL          Psychiatric Examination:     /88   Pulse 93   Temp 98  F (36.7  C) (Temporal)   Resp 16   Ht 1.829 m (6')   Wt 73.7 kg (162 lb 8 oz)   SpO2 98%   BMI 22.04 kg/m    Weight is 162 lbs 8 oz  Body mass index is 22.04 kg/m .  Orthostatic Vitals  Report      Most Recent      Lying Orthostatic /87 01/02 0906    Sitting Orthostatic /87 01/02 0906    Sitting Orthostatic Pulse (bpm) 84 01/02 0906            Appearance: awake, alert and adequately groomed  Attitude:  more cooperative  Eye Contact:  fair  Mood:  good  Affect:  intensity is flat  Speech:  paucity of speech  Psychomotor Behavior:  no evidence of tardive dyskinesia, dystonia, or tics  Thought Process:  concrete  Associations:  no loose associations  Thought Content:  no evidence of suicidal ideation or homicidal ideation and obsessions present  Insight:  partial  Judgement:  limited  Oriented to:  person only  Attention Span and Concentration:  fair  Recent and Remote Memory:  poor    Clinical Global Impressions  First:  Considering your total clinical experience with this particular patient population, how severe are the patient's symptoms at this time?: 7 (07/01/21 0630)  Compared to the patient's condition at the START of treatment, this patient's condition is: 4 (07/01/21 0630)  Most recent:  Considering your total clinical experience with this particular patient population, how severe are the patient's symptoms at this time?: 4 (01/03/22 1344)  Compared to the patient's condition at the START of treatment, this patient's condition is: 2 (01/03/22 1344)         Precautions:     Behavioral Orders   Procedures     Assault precautions     Cheeking Precautions (behavioral units)     Patient Observed swallowing PO medications; Patient asked to drink water after swallowing medication; Patient in Staff line of sight for 15 minutes after medication given; Mouth checks after PO administration (patient  asked to open mouth and stick out their tongue).     Code 1     Code 2     With Security for any imaging/testing needed.     Elopement precautions     Fall precautions     Routine Programming     As clinically indicated     Single Room     Status 15     Every 15 minutes.          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   2) Continue Remeron, Buspar and gabapentin.   3) Continue Aricept and Namenda.   4) Patient is committed with Yanez.   5) [facility] legal is working on guardianship and placement.   6) Medicine and nutrition following peripherally. Patient has improved eating and drinking.       Disposition Plan   Reason for ongoing admission: is unable to care for self due to severe psychosis or mariusz  Discharge location: group home  Discharge Medications: not ordered  Follow-up Appointments: not scheduled  Legal Status: full commitment    Entered by: Wilton Morfin on January 3, 2022 at 1:45 PM

## 2022-01-03 NOTE — PLAN OF CARE
"At the end of the day shift patient became agitated (see day shift note). This writer approached patient and asked him to take risperidone, he said \"ok\" held out his hand and was compliant with medication. He then went to his room and has been calm thus far. He came to the dining room for dinner and ate 50% of his dinner, 2 pudding's and all beverages provided. Good fluid intake. Voids with out difficulty. Denies SI and SIB. Denies all MH symptoms. He denies pain. His gait is unsteady due to c/o of dizziness when walking to the dining room. He refused vital signs at this time. He refused COVID swab. He is not social with peers or staff. Only talks when spoken to. He is medication compliant. He refused to attend groups. He refused shower.   "

## 2022-01-03 NOTE — PLAN OF CARE
Problem: Sleep Disturbance  Goal: Adequate Sleep/Rest  Outcome: No Change     Patient slept a total of 9 hours. No complaints noted the whole night.

## 2022-01-04 PROCEDURE — 250N000013 HC RX MED GY IP 250 OP 250 PS 637: Performed by: PSYCHIATRY & NEUROLOGY

## 2022-01-04 PROCEDURE — 124N000003 HC R&B MH SENIOR/ADOLESCENT

## 2022-01-04 PROCEDURE — 99233 SBSQ HOSP IP/OBS HIGH 50: CPT | Performed by: PSYCHIATRY & NEUROLOGY

## 2022-01-04 RX ADMIN — CLOZAPINE 200 MG: 100 TABLET ORAL at 20:56

## 2022-01-04 RX ADMIN — BUSPIRONE HYDROCHLORIDE 30 MG: 15 TABLET ORAL at 08:42

## 2022-01-04 RX ADMIN — SALMETEROL XINAFOATE 1 PUFF: 50 POWDER, METERED ORAL; RESPIRATORY (INHALATION) at 20:56

## 2022-01-04 RX ADMIN — ATORVASTATIN CALCIUM 80 MG: 80 TABLET, FILM COATED ORAL at 20:56

## 2022-01-04 RX ADMIN — BUSPIRONE HYDROCHLORIDE 30 MG: 15 TABLET ORAL at 20:56

## 2022-01-04 RX ADMIN — DONEPEZIL HYDROCHLORIDE 10 MG: 10 TABLET ORAL at 20:56

## 2022-01-04 RX ADMIN — GABAPENTIN 100 MG: 100 CAPSULE ORAL at 12:04

## 2022-01-04 RX ADMIN — GABAPENTIN 100 MG: 100 CAPSULE ORAL at 08:42

## 2022-01-04 RX ADMIN — SALMETEROL XINAFOATE 1 PUFF: 50 POWDER, METERED ORAL; RESPIRATORY (INHALATION) at 08:44

## 2022-01-04 RX ADMIN — MEMANTINE 10 MG: 10 TABLET ORAL at 20:56

## 2022-01-04 RX ADMIN — MIRTAZAPINE 15 MG: 15 TABLET, FILM COATED ORAL at 20:56

## 2022-01-04 RX ADMIN — MELATONIN TAB 3 MG 3 MG: 3 TAB at 20:55

## 2022-01-04 RX ADMIN — HALOPERIDOL 1 MG: 1 TABLET ORAL at 20:55

## 2022-01-04 RX ADMIN — GABAPENTIN 100 MG: 100 CAPSULE ORAL at 17:34

## 2022-01-04 RX ADMIN — MEGESTROL ACETATE 20 MG: 20 TABLET ORAL at 08:42

## 2022-01-04 RX ADMIN — LEVOTHYROXINE SODIUM 88 MCG: 88 TABLET ORAL at 08:42

## 2022-01-04 RX ADMIN — MEMANTINE 10 MG: 10 TABLET ORAL at 08:42

## 2022-01-04 RX ADMIN — ASPIRIN 81 MG CHEWABLE TABLET 81 MG: 81 TABLET CHEWABLE at 08:42

## 2022-01-04 ASSESSMENT — ACTIVITIES OF DAILY LIVING (ADL)
HYGIENE/GROOMING: PROMPTS;WITH SUPERVISION
ORAL_HYGIENE: PROMPTS

## 2022-01-04 ASSESSMENT — MIFFLIN-ST. JEOR: SCORE: 1590.56

## 2022-01-04 NOTE — PROGRESS NOTES
"[facility], [place]   Psychiatric Progress Note        Interim History:   The patient's care was discussed with the treatment team during the daily team meeting and/or staff's chart notes were reviewed.  Staff report patient has been overall doing better. Is eating more and sleeping well. Was referred to [facility] [place].     The patient reports that he is \"tired\". Says that he had a difficult time staying asleep. Does admit that he is eating better. Plans to shower tomorrow. Denies SI.          Medications:       aspirin  81 mg Oral Daily     atorvastatin  80 mg Oral At Bedtime     busPIRone  30 mg Oral BID     [Held by provider] cholecalciferol  1,250 mcg Oral Q7 Days     cloZAPine  200 mg Oral At Bedtime     donepezil  10 mg Oral At Bedtime     gabapentin  100 mg Oral TID w/meals     haloperidol  1 mg Oral At Bedtime     levothyroxine  88 mcg Oral Daily     megestrol  20 mg Oral Daily     memantine  10 mg Oral BID     [Held by provider] metoprolol tartrate  25 mg Oral BID     mirtazapine  15 mg Oral At Bedtime     salmeterol  1 puff Inhalation BID          Allergies:     Allergies   Allergen Reactions     Ibuprofen      Latex           Labs:     No results found for this or any previous visit (from the past 24 hour(s)).       Psychiatric Examination:     /84   Pulse 88   Temp 96.8  F (36  C) (Temporal)   Resp 16   Ht 1.829 m (6')   Wt 73.3 kg (161 lb 8 oz)   SpO2 98%   BMI 21.90 kg/m    Weight is 161 lbs 8 oz  Body mass index is 21.9 kg/m .  Orthostatic Vitals  Report      Most Recent      Lying Orthostatic /87 01/02 0906    Sitting Orthostatic /87 01/02 0906    Sitting Orthostatic Pulse (bpm) 84 01/02 0906            Appearance: awake, alert and adequately groomed  Attitude:  more cooperative  Eye Contact:  fair  Mood:  \"tired\"  Affect:  intensity is flat  Speech:  paucity of speech  Psychomotor Behavior:  no evidence of tardive dyskinesia, dystonia, or " tics  Thought Process:  concrete  Associations:  no loose associations  Thought Content:  no evidence of suicidal ideation or homicidal ideation and obsessions present  Insight:  partial  Judgement:  limited  Oriented to:  person only  Attention Span and Concentration:  fair  Recent and Remote Memory:  poor    Clinical Global Impressions  First:  Considering your total clinical experience with this particular patient population, how severe are the patient's symptoms at this time?: 7 (07/01/21 0630)  Compared to the patient's condition at the START of treatment, this patient's condition is: 4 (07/01/21 0630)  Most recent:  Considering your total clinical experience with this particular patient population, how severe are the patient's symptoms at this time?: 4 (01/03/22 1344)  Compared to the patient's condition at the START of treatment, this patient's condition is: 2 (01/03/22 1344)         Precautions:     Behavioral Orders   Procedures     Assault precautions     Cheeking Precautions (behavioral units)     Patient Observed swallowing PO medications; Patient asked to drink water after swallowing medication; Patient in Staff line of sight for 15 minutes after medication given; Mouth checks after PO administration (patient asked to open mouth and stick out their tongue).     Code 1     Code 2     With Security for any imaging/testing needed.     Elopement precautions     Fall precautions     Routine Programming     As clinically indicated     Single Room     Status 15     Every 15 minutes.          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   2) Continue Remeron, Buspar and gabapentin.   3) Continue Aricept and Namenda.   4) Patient is committed with Yanez.   5) [facility] and [place] legal  is working on guardianship and placement.   6) Medicine and nutrition following peripherally. Patient has improved eating and drinking.       Disposition Plan   Reason for ongoing admission: is unable to care for self due to severe psychosis or mariusz  Discharge location: group home  Discharge Medications: not ordered  Follow-up Appointments: not scheduled  Legal Status: full commitment    Entered by: Wilton Morfin on January 3, 2022 at 11:23 AM

## 2022-01-04 NOTE — PLAN OF CARE
"Assessment/Intervention/Current Symptoms and Care Coordination:    Current Symptoms include the following: Confusion, delusional thinking, irritability, oriented to only self, and to other, and poor nutrition. Pt is improving in terms of coming out in the milieu and having his food there. Still isolating.    Chart Review    Rounded with team    Updated  on pt's status    Discharge Plan or Goal:  Pending stabilization & development of a safe discharge plan.  Considerations include: Nursing home. Guardianship process is being pursued. Awaiting the outcome of new DHS \"Going Home Program\"     Barriers to Discharge:  Patient requires further psychiatric stabilization due to current symptomology, Medication management with possible adjustments and guardianship     Referral Status:  No new referrals made.     Legal Status:  Patient is under MI commitment in [facility] with Yanez. [id number]        Contacts:  Case Management Services:  [facility]   Kurtis Chapin. Phone: [phone]  Supervisor is [email or web address]     Freida Machuca  Sister  [phone]  "

## 2022-01-04 NOTE — PLAN OF CARE
"Assessment/Intervention/Current Symptoms and Care Coordination:    Current Symptoms include the following: Confusion, delusional thinking, irritability, oriented to only self, and to other, and poor nutrition. Pt is improving in terms of coming out in the milieu and having his food there. Still isolating.    Chart Review    Rounded with team    Updated . Received communication from [facility] staff regarding arrangements and referrals for [facility] [place]. Apparently, they are still trying to find out how the program will work for this pt. The  will be updating the team on the outcome.     Discharge Plan or Goal:  Pending stabilization & development of a safe discharge plan.  Considerations include: Nursing home. Guardianship process is being pursued. Awaiting the outcome of new DHS \"Going Home Program\"      Barriers to Discharge:  Patient requires further psychiatric stabilization due to current symptomology, Medication management with possible adjustments and guardianship     Referral Status:  No new referrals made.     Legal Status:  Patient is under MI commitment in [facility] with Yanez. [id number]     Contacts:  Case Management Services:  [facility]   Kurtis Chapin. Phone: [phone]  Supervisor is [email or web address]     Freida Machuca  Sister  [phone]  "

## 2022-01-04 NOTE — PLAN OF CARE
"  Pt observed lying in bed. Eye contact appropriate, sustained. Affect flat. Denies anxiety, depression or SI. Denies pain or side effects. Reports his only concern is \"I want someone to bring me a cigarette so I can go smoke.\" Declined to attend community meeting or therapeutic group. Declined shower or assistance with ADL's. Reports changing pull-up independently when needed. Observed changing position in bed independently. Disoriented to date, exact place. Refused skin inspection but denied problems. Ambulating slowly but steadily. A staff brought dinner to pt's room by mistake, so he did not come out to [facility] for dinner but did agree to come out for snack. Intake for shift: 530 ml. Output: pt refuses to measure. C/o \"I'm tired, I was up all night.\" Given melatonin 3 mg po PRN with HS meds and encouraged to continue to be up and out of bed more frequently during daytime hours. Overalcalm, pleasant, cooperative. Continue with current treatment plan and recommendations. Continue to monitor and reassess symptoms. Monitor response to medications. Monitor progress towards treatment goals. Encourage groups and participation.   "

## 2022-01-04 NOTE — PLAN OF CARE
Problem: Psychotic Symptoms  Goal: Psychotic Symptoms  Description: Signs and symptoms of listed problems will be absent or manageable.  Outcome: No Change  Flowsheets (Taken 1/3/2022 1846)  Psychotic Symptoms Assessed: all  Psychotic Symptoms Present:    affect    insight    anxiety    mood    thought process    speech    memory deficits    orientation    Patient came out fr his room during the community meeting but refused to attend. He wanted to watch TV but was told that it is turned off whenever there activities going on. Then, he wanted to use the phone but was upset when they were also turned off. Instead, patient went back to his room seemingly not happy. Patient only came out later during dinner time and then went back to his room again. Patient denied SI/SIB nor hallucinations. He denied wishing himself to be dead. Denied all mental health issues. No S/E of medications observed. He remains isolative and withdrawn.

## 2022-01-04 NOTE — PLAN OF CARE
Problem: Sleep Disturbance  Goal: Adequate Sleep/Rest  Outcome: No Change     Slept well for 11.5 hours  O2 sats 96% at room air Pulse 97 bpm  No intake or output this shift.

## 2022-01-04 NOTE — PLAN OF CARE
"  Problem: Psychotic Symptoms  Goal: Social and Therapeutic (Psychotic Symptoms)  Description: Signs and symptoms of listed problems will be absent or manageable.  Recent Flowsheet Documentation  Taken 1/4/2022 1129 by Una Gaytan RN  Psychotic Symptoms Assessed: all  Psychotic Symptoms Present:   affect   mood   speech   insight   orientation   memory deficits   thought process  Pt continues to have poor insight into his mental health/cognitive issues. Pt was oriented to himself this AM and aware that he was in a hospital but continues to report that he is at [facility]. Pt stated the month as June and the year as 2099. Pt did state the day of the week to be Monday or Tuesday. Pt declined to complete ADL's this AM with writers prompts. Pt continues to have poor hygiene and refuses to shower. Pt's hair is greasy and he appears unkempt. Pt declined to shave or trim his facial hair this AM. Pt's mustache is becoming overly long and lays over most of his top lip.   Pt continues to need prompts to eat and drink.   When writer asked him if she could do anything for him his response was \"get me a cigarette\".  Pt's affect is flat and his eye contact is inconsistent. Pt unable to describe his mood but denies SI/SIB/wishes to be dead.  Pt is medication compliant.      "

## 2022-01-05 PROCEDURE — 99233 SBSQ HOSP IP/OBS HIGH 50: CPT | Performed by: PSYCHIATRY & NEUROLOGY

## 2022-01-05 PROCEDURE — 250N000013 HC RX MED GY IP 250 OP 250 PS 637: Performed by: PSYCHIATRY & NEUROLOGY

## 2022-01-05 PROCEDURE — 124N000003 HC R&B MH SENIOR/ADOLESCENT

## 2022-01-05 RX ADMIN — ATORVASTATIN CALCIUM 80 MG: 80 TABLET, FILM COATED ORAL at 20:00

## 2022-01-05 RX ADMIN — BUSPIRONE HYDROCHLORIDE 30 MG: 15 TABLET ORAL at 20:00

## 2022-01-05 RX ADMIN — BUSPIRONE HYDROCHLORIDE 30 MG: 15 TABLET ORAL at 08:44

## 2022-01-05 RX ADMIN — CLOZAPINE 200 MG: 100 TABLET ORAL at 20:01

## 2022-01-05 RX ADMIN — MEMANTINE 10 MG: 10 TABLET ORAL at 20:01

## 2022-01-05 RX ADMIN — GABAPENTIN 100 MG: 100 CAPSULE ORAL at 08:45

## 2022-01-05 RX ADMIN — SALMETEROL XINAFOATE 1 PUFF: 50 POWDER, METERED ORAL; RESPIRATORY (INHALATION) at 20:00

## 2022-01-05 RX ADMIN — ASPIRIN 81 MG CHEWABLE TABLET 81 MG: 81 TABLET CHEWABLE at 08:43

## 2022-01-05 RX ADMIN — MEMANTINE 10 MG: 10 TABLET ORAL at 08:44

## 2022-01-05 RX ADMIN — MEGESTROL ACETATE 20 MG: 20 TABLET ORAL at 08:43

## 2022-01-05 RX ADMIN — HALOPERIDOL 1 MG: 1 TABLET ORAL at 20:01

## 2022-01-05 RX ADMIN — GABAPENTIN 100 MG: 100 CAPSULE ORAL at 17:16

## 2022-01-05 RX ADMIN — MELATONIN TAB 3 MG 3 MG: 3 TAB at 20:00

## 2022-01-05 RX ADMIN — MIRTAZAPINE 15 MG: 15 TABLET, FILM COATED ORAL at 20:00

## 2022-01-05 RX ADMIN — GABAPENTIN 100 MG: 100 CAPSULE ORAL at 12:11

## 2022-01-05 RX ADMIN — LEVOTHYROXINE SODIUM 88 MCG: 88 TABLET ORAL at 08:44

## 2022-01-05 RX ADMIN — DONEPEZIL HYDROCHLORIDE 10 MG: 10 TABLET ORAL at 20:00

## 2022-01-05 RX ADMIN — SALMETEROL XINAFOATE 1 PUFF: 50 POWDER, METERED ORAL; RESPIRATORY (INHALATION) at 08:45

## 2022-01-05 ASSESSMENT — ACTIVITIES OF DAILY LIVING (ADL)
DRESS: PROMPTS;WITH SUPERVISION
ORAL_HYGIENE: PROMPTS;WITH SUPERVISION
HYGIENE/GROOMING: WITH ASSISTANCE
DRESS: SCRUBS (BEHAVIORAL HEALTH);INDEPENDENT;PROMPTS
HYGIENE/GROOMING: WITH ASSISTANCE
LAUNDRY: UNABLE TO COMPLETE

## 2022-01-05 NOTE — PROGRESS NOTES
"[facility], [place]   Psychiatric Progress Note        Interim History:   The patient's care was discussed with the treatment team during the daily team meeting and/or staff's chart notes were reviewed.  Staff report patient has been pleasant with poor insight. Poor hygiene. Has been referred to \"[facility] [place].\"     The patient reports that he is \"good.\" Reports good sleep and appetite. Denies SI. Says that he does not plan to shower today. When reminded that he told this provider yesterday that he would shower tomorrow, he says, \"I'll shower tomorrow.\"          Medications:       aspirin  81 mg Oral Daily     atorvastatin  80 mg Oral At Bedtime     busPIRone  30 mg Oral BID     [Held by provider] cholecalciferol  1,250 mcg Oral Q7 Days     cloZAPine  200 mg Oral At Bedtime     donepezil  10 mg Oral At Bedtime     gabapentin  100 mg Oral TID w/meals     haloperidol  1 mg Oral At Bedtime     levothyroxine  88 mcg Oral Daily     megestrol  20 mg Oral Daily     memantine  10 mg Oral BID     [Held by provider] metoprolol tartrate  25 mg Oral BID     mirtazapine  15 mg Oral At Bedtime     salmeterol  1 puff Inhalation BID          Allergies:     Allergies   Allergen Reactions     Ibuprofen      Latex           Labs:     No results found for this or any previous visit (from the past 24 hour(s)).       Psychiatric Examination:     /77   Pulse 95   Temp 98.2  F (36.8  C) (Temporal)   Resp 16   Ht 1.829 m (6')   Wt 73.3 kg (161 lb 8 oz)   SpO2 96%   BMI 21.90 kg/m    Weight is 161 lbs 8 oz  Body mass index is 21.9 kg/m .  Orthostatic Vitals  Report      Most Recent      Lying Orthostatic /87 01/02 0906    Sitting Orthostatic /87 01/02 0906    Sitting Orthostatic Pulse (bpm) 84 01/02 0906            Appearance: awake, alert and adequately groomed  Attitude:  more cooperative  Eye Contact:  fair  Mood:  good  Affect:  intensity is flat  Speech:  paucity of " speech  Psychomotor Behavior:  no evidence of tardive dyskinesia, dystonia, or tics  Thought Process:  concrete  Associations:  no loose associations  Thought Content:  no evidence of suicidal ideation or homicidal ideation and obsessions present  Insight:  partial  Judgement:  limited  Oriented to:  person only  Attention Span and Concentration:  fair  Recent and Remote Memory:  poor    Clinical Global Impressions  First:  Considering your total clinical experience with this particular patient population, how severe are the patient's symptoms at this time?: 7 (07/01/21 0630)  Compared to the patient's condition at the START of treatment, this patient's condition is: 4 (07/01/21 0630)  Most recent:  Considering your total clinical experience with this particular patient population, how severe are the patient's symptoms at this time?: 4 (01/03/22 1344)  Compared to the patient's condition at the START of treatment, this patient's condition is: 2 (01/03/22 1344)         Precautions:     Behavioral Orders   Procedures     Assault precautions     Cheeking Precautions (behavioral units)     Patient Observed swallowing PO medications; Patient asked to drink water after swallowing medication; Patient in Staff line of sight for 15 minutes after medication given; Mouth checks after PO administration (patient asked to open mouth and stick out their tongue).     Code 1     Code 2     With Security for any imaging/testing needed.     Elopement precautions     Fall precautions     Routine Programming     As clinically indicated     Single Room     Status 15     Every 15 minutes.          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   2) Continue Remeron, Buspar and gabapentin.   3) Continue Aricept  and Namenda.   4) Patient is committed with Yanez.   5) [facility] and [place] legal is working on guardianship and placement.   6) Medicine and nutrition following peripherally. Patient has improved eating and drinking.       Disposition Plan   Reason for ongoing admission: is unable to care for self due to severe psychosis or mariusz  Discharge location: group home  Discharge Medications: not ordered  Follow-up Appointments: not scheduled  Legal Status: full commitment    Entered by: Wilton Morfin on January 5, 2022 at 10:41 AM

## 2022-01-05 NOTE — PLAN OF CARE
Problem: Sleep Disturbance  Goal: Adequate Sleep/Rest  Outcome: No Change     O2 sat-97% @ R.A.  Patient slept a total of 12.5 hours. Intake-0. Output - voided once. No concerns raised the whole shift.

## 2022-01-05 NOTE — PLAN OF CARE
Problem: Behavioral Disturbance  Goal: Behavioral Disturbance  Description: Signs and symptoms of listed problems will be absent or manageable by discharge or transition of care.  Outcome: Improving  Flowsheets (Taken 1/5/2022 1147)  Behavioral Disturbance Assessed: all  Behavioral Disturbance Present:    mood    affect    speech    thought process    insight    orientation    memory deficits    Pt continues to report that wants to go home. Pt has poor insight into his mental/physical health. Pt is oriented to himself and hospital however her believes he is at [facility]. Pt's affect is flat with inconsistent eye contact. Pt denies SI/SIB/wishes to be dead. Pt needs prompts to complete ADL's. Pt was agreeable to change scrubs today and socks, however he refused to change his incontinent brief(brief appeared clean and dry). Pt declined oral care. [facility] but ate 90% of his cream of wheat, 240 ml of 2% milk and 120 ml of juice for breakfast.    Pt cooperative with coming out late morning for a snack. Pt ate soda crackers with sun butter and coffee. Pt did allow writer to attempt to comb out mat in his hair with de-tangling spray. Writer was unable to complete this due to extensiveness of mat. Pt may need to have hair cut if he allows.

## 2022-01-05 NOTE — PLAN OF CARE
"Assessment/Intervention/Current Symptoms and Care Coordination:    Current Symptoms include the following: Confusion, delusional thinking, irritability, oriented to only self, and to other, and poor nutrition. Pt is improving in terms of coming out in the milieu and having his food there. Still isolating.    Chart Review    Rounded with team     Received communication from [facility] staff regarding arrangements and referrals for [facility] [place]. Apparently, they are still trying to find out how the program will work for this pt. The  will be updating the team on the outcome.     Discharge Plan or Goal:  Pending stabilization & development of a safe discharge plan.  Considerations include: Nursing home. Guardianship process is being pursued. Awaiting the outcome of new [facility] \"Going Home Program\"      Barriers to Discharge:  Patient requires further psychiatric stabilization due to current symptomology, Medication management with possible adjustments and guardianship     Referral Status:  No new referrals made.     Legal Status:  Patient is under MI commitment in [facility] with Yanez. 50-[id number]-YC-     Contacts:  Case Management Services:  [facility]   Kurtis Chapin. Phone: [phone]  Supervisor is [email or web address]     Freida Machuca  Sister  [phone]  "

## 2022-01-06 LAB
ANION GAP SERPL CALCULATED.3IONS-SCNC: 7 MMOL/L (ref 3–14)
BASO+EOS+MONOS # BLD AUTO: 0 10E3/UL (ref 0–2.2)
BASO+EOS+MONOS NFR BLD AUTO: 0 %
BASOPHILS # BLD AUTO: 0.2 10E3/UL (ref 0–0.2)
BASOPHILS NFR BLD AUTO: 2 %
BUN SERPL-MCNC: 18 MG/DL (ref 7–30)
CALCIUM SERPL-MCNC: 8.9 MG/DL (ref 8.5–10.1)
CHLORIDE BLD-SCNC: 116 MMOL/L (ref 94–109)
CO2 SERPL-SCNC: 19 MMOL/L (ref 20–32)
CREAT SERPL-MCNC: 0.65 MG/DL (ref 0.66–1.25)
EOSINOPHIL # BLD AUTO: 3.3 10E3/UL (ref 0–0.7)
EOSINOPHIL NFR BLD AUTO: 43 %
ERYTHROCYTE [DISTWIDTH] IN BLOOD BY AUTOMATED COUNT: 15.7 % (ref 10–15)
GFR SERPL CREATININE-BSD FRML MDRD: >90 ML/MIN/1.73M2
GLUCOSE BLD-MCNC: 99 MG/DL (ref 70–99)
HCT VFR BLD AUTO: 39.9 % (ref 40–53)
HGB BLD-MCNC: 13.7 G/DL (ref 13.3–17.7)
IMM GRANULOCYTES # BLD: 0 10E3/UL
IMM GRANULOCYTES NFR BLD: 0 %
LYMPHOCYTES # BLD AUTO: 1.6 10E3/UL (ref 0.8–5.3)
LYMPHOCYTES NFR BLD AUTO: 21 %
MAGNESIUM SERPL-MCNC: 2.1 MG/DL (ref 1.6–2.3)
MCH RBC QN AUTO: 30.8 PG (ref 26.5–33)
MCHC RBC AUTO-ENTMCNC: 34.3 G/DL (ref 31.5–36.5)
MCV RBC AUTO: 90 FL (ref 78–100)
MONOCYTES # BLD AUTO: 0.1 10E3/UL (ref 0–1.3)
MONOCYTES NFR BLD AUTO: 1 %
NEUTROPHILS # BLD AUTO: 2.5 10E3/UL (ref 1.6–8.3)
NEUTROPHILS NFR BLD AUTO: 33 %
NRBC # BLD AUTO: 0 10E3/UL
NRBC BLD AUTO-RTO: 0 /100
PHOSPHATE SERPL-MCNC: 3.7 MG/DL (ref 2.5–4.5)
PLATELET # BLD AUTO: 211 10E3/UL (ref 150–450)
POTASSIUM BLD-SCNC: 3.8 MMOL/L (ref 3.4–5.3)
RBC # BLD AUTO: 4.45 10E6/UL (ref 4.4–5.9)
SODIUM SERPL-SCNC: 142 MMOL/L (ref 133–144)
WBC # BLD AUTO: 7.6 10E3/UL (ref 4–11)

## 2022-01-06 PROCEDURE — 99231 SBSQ HOSP IP/OBS SF/LOW 25: CPT | Performed by: PHYSICIAN ASSISTANT

## 2022-01-06 PROCEDURE — 83735 ASSAY OF MAGNESIUM: CPT | Performed by: PSYCHIATRY & NEUROLOGY

## 2022-01-06 PROCEDURE — 82310 ASSAY OF CALCIUM: CPT | Performed by: PSYCHIATRY & NEUROLOGY

## 2022-01-06 PROCEDURE — 124N000003 HC R&B MH SENIOR/ADOLESCENT

## 2022-01-06 PROCEDURE — 85014 HEMATOCRIT: CPT | Performed by: PSYCHIATRY & NEUROLOGY

## 2022-01-06 PROCEDURE — 250N000013 HC RX MED GY IP 250 OP 250 PS 637: Performed by: PSYCHIATRY & NEUROLOGY

## 2022-01-06 PROCEDURE — 84100 ASSAY OF PHOSPHORUS: CPT | Performed by: PSYCHIATRY & NEUROLOGY

## 2022-01-06 PROCEDURE — 99233 SBSQ HOSP IP/OBS HIGH 50: CPT | Performed by: PSYCHIATRY & NEUROLOGY

## 2022-01-06 PROCEDURE — 36415 COLL VENOUS BLD VENIPUNCTURE: CPT | Performed by: PSYCHIATRY & NEUROLOGY

## 2022-01-06 RX ORDER — LIDOCAINE 40 MG/G
CREAM TOPICAL
Status: DISCONTINUED | OUTPATIENT
Start: 2022-01-06 | End: 2022-01-18 | Stop reason: HOSPADM

## 2022-01-06 RX ADMIN — SALMETEROL XINAFOATE 1 PUFF: 50 POWDER, METERED ORAL; RESPIRATORY (INHALATION) at 20:31

## 2022-01-06 RX ADMIN — MIRTAZAPINE 15 MG: 15 TABLET, FILM COATED ORAL at 20:31

## 2022-01-06 RX ADMIN — LEVOTHYROXINE SODIUM 88 MCG: 88 TABLET ORAL at 08:29

## 2022-01-06 RX ADMIN — GABAPENTIN 100 MG: 100 CAPSULE ORAL at 08:30

## 2022-01-06 RX ADMIN — BUSPIRONE HYDROCHLORIDE 30 MG: 15 TABLET ORAL at 08:29

## 2022-01-06 RX ADMIN — HALOPERIDOL 1 MG: 1 TABLET ORAL at 20:31

## 2022-01-06 RX ADMIN — MEMANTINE 10 MG: 10 TABLET ORAL at 08:29

## 2022-01-06 RX ADMIN — MEMANTINE 10 MG: 10 TABLET ORAL at 20:31

## 2022-01-06 RX ADMIN — SALMETEROL XINAFOATE 1 PUFF: 50 POWDER, METERED ORAL; RESPIRATORY (INHALATION) at 08:30

## 2022-01-06 RX ADMIN — ASPIRIN 81 MG CHEWABLE TABLET 81 MG: 81 TABLET CHEWABLE at 08:29

## 2022-01-06 RX ADMIN — MEGESTROL ACETATE 20 MG: 20 TABLET ORAL at 08:29

## 2022-01-06 RX ADMIN — GABAPENTIN 100 MG: 100 CAPSULE ORAL at 12:25

## 2022-01-06 RX ADMIN — DONEPEZIL HYDROCHLORIDE 10 MG: 10 TABLET ORAL at 20:31

## 2022-01-06 RX ADMIN — ATORVASTATIN CALCIUM 80 MG: 80 TABLET, FILM COATED ORAL at 20:31

## 2022-01-06 RX ADMIN — BUSPIRONE HYDROCHLORIDE 30 MG: 15 TABLET ORAL at 20:31

## 2022-01-06 RX ADMIN — GABAPENTIN 100 MG: 100 CAPSULE ORAL at 17:10

## 2022-01-06 RX ADMIN — CLOZAPINE 200 MG: 100 TABLET ORAL at 20:31

## 2022-01-06 ASSESSMENT — ACTIVITIES OF DAILY LIVING (ADL)
ORAL_HYGIENE: PROMPTS;WITH ASSISTANCE
DRESS: SCRUBS (BEHAVIORAL HEALTH)
LAUNDRY: UNABLE TO COMPLETE
HYGIENE/GROOMING: WITH ASSISTANCE;PROMPTS
ORAL_HYGIENE: PROMPTS;INDEPENDENT
DRESS: SCRUBS (BEHAVIORAL HEALTH)
LAUNDRY: UNABLE TO COMPLETE
HYGIENE/GROOMING: WITH ASSISTANCE;PROMPTS

## 2022-01-06 ASSESSMENT — MIFFLIN-ST. JEOR: SCORE: 1599.63

## 2022-01-06 NOTE — PROGRESS NOTES
Brief medicine note:  - Asked to see pt regarding tachycardia and soft BPs. Currently pt observed resting in bed. Denies acute physical concerns at this time including fever, chills, chest pain, lightheadedness, SOB, abd pain, nausea, bowel and bladder concerns.     GEN: In NAD  Blood pressure 90/74, pulse 118, temperature 98.7  F (37.1  C), temperature source Oral, resp. rate 18, height 1.829 m (6'), weight 74.2 kg (163 lb 8 oz), SpO2 96 %.  LUNGS: CTAB  CV: Tachy yet regular  EXT: No BLE edema    ASSESSMENT:  Tachycardia and borderline hypotension, likely multifactorial and due to decreased PO intake and SE from current meds (e.g., clozapine).     PLAN: Routine EKG. 1L NS IVF bolus. No further medical intervention at this time. Medicine will follow up and see pt tomorrow and if still asymptomatic, EKG unremarkable, and BPs improve will again sign off. Please feel free to call with questions.      Mk Antonio PA-C  Internal Medicine Hospitalist   [facility] Hospitalist group  [phone]

## 2022-01-06 NOTE — PLAN OF CARE
"Pt presents with blunted, flat affect and calm mood. Denies SI/SIB and hallucinations - after lunch while sitting in the lounge, pt was noted to be responding to internal stimuli, pt was noted to be saying \"That stupid gustavo Lawson put me in here!\" Continues to be alert to self only. This morning when checking in with the pt, he asked if he was at [facility], advised pt he was not, and that he was at [facility]. About five minutes later, pt asked once again if he was at [facility]. Speech continues to be incoherent at times, rambling, and hoarse. Pt remained largely isolated to his room. Did not attend any groups. Refused to come out for breakfast when prompted multiple times and refused his food. Later came out around 1045 and ate 100% of his breakfast tray. Refused lunch at lunchtime but wanted us to save his tray for later. He did come out about 1315 and ate only 25%. Continues to appear disheveled and refuses to perform ADLs. Pt was tachycardic (126) by monitor, 120 radial pulse - paged internal medicine to alert them. Later in the afternoon BP 90/74  O2 99%, radial check of pulse 118 - paged Dr. Morfin who advised to page internal medicine due to concern about pt potentially needing fluids, internal medicine was paged at this time, advised they will review chart and pt may possibly need fluids. All other VS WNL. Denies pain. No behavioral issues noted this shift. Medication compliant. Pt has been urinating in the toilet as he will not urinate in the hat or urinal to measure for I&O. No bowel movement noted today. No other concerns or complaints noted.   "

## 2022-01-06 NOTE — PLAN OF CARE
"Assessment/Intervention/Current Symptoms and Care Coordination:    Current Symptoms include the following: Confusion, delusional thinking, irritability, oriented to only self, and to other, and poor nutrition. Pt is improving in terms of coming out in the milieu and having his food there. Still isolating.    Chart Review    Rounded with team     Discharge Plan or Goal:  Pending stabilization & development of a safe discharge plan.  Considerations include: Nursing home. Guardianship process is being pursued. Awaiting the outcome of new DHS \"Going Home Program\"      Barriers to Discharge:  Patient requires further psychiatric stabilization due to current symptomology, Medication management with possible adjustments and guardianship     Referral Status:  No new referrals made.     Legal Status:  Patient is under MI commitment in [facility] with Yanez. 49-TQ-HA-     Contacts:  Case Management Services:  [facility]   Kurtis Chapin. Phone: [phone]  Supervisor is [email or web address]     Freida Machuca    [phone]  "

## 2022-01-06 NOTE — PLAN OF CARE
Problem: Sleep Disturbance  Goal: Adequate Sleep/Rest  Outcome: No Change     O2 sat-96% @ R.A.  Total Intake-0. Total Output-0. Patient slept a total of 10.25 hours.

## 2022-01-06 NOTE — PLAN OF CARE
"  Pt continues to be pleasant, cooperative, calm. Came out to lounge independently and without prompting prior to dinner. Asked if he was in the \"treatment center\" and was re-oriented. Approx 20-30 minutes later, he asked another staff again where he was. Drank approx 80% of a vanilla Ensure and ate approx 25% of dinner. Drinking adequate fluids. Affect flat. Eye contact appropriate. No irritability observed, no profanity in speech. Verbal responses delayed. Appears to be internally preoccupied at times. When asked, he stated he was hearing voices. Declined PRN stating they were saying \"good things\" and not \"bad things.\" Withdrawn from peers. Lying in bed much of evening except when out to lounge to eat meals & snacks. Med-compliant. Continue with current treatment plan and recommendations. Continue to monitor and reassess symptoms. Monitor response to medications. Monitor progress towards treatment goals. Encourage groups and participation.   "

## 2022-01-06 NOTE — PROGRESS NOTES
Pt refused both EKG and IV fluids - encouraging pt to push fluids at this time. Paged internal medicine to make aware, no response back as of yet. Will continue to monitor pt. VS stable at this time, HR decreased to 115.

## 2022-01-06 NOTE — PROGRESS NOTES
"[facility], [place]   Psychiatric Progress Note        Interim History:   The patient's care was discussed with the treatment team during the daily team meeting and/or staff's chart notes were reviewed.  Staff report patient was endorsing AH of \"good things\" last night. Is \"status quo.\"     The patient reports that he is \"tired.\" Poor eye contact and somnolent today. Is eating fairly well. Denies SI.          Medications:       aspirin  81 mg Oral Daily     atorvastatin  80 mg Oral At Bedtime     busPIRone  30 mg Oral BID     [Held by provider] cholecalciferol  1,250 mcg Oral Q7 Days     cloZAPine  200 mg Oral At Bedtime     donepezil  10 mg Oral At Bedtime     gabapentin  100 mg Oral TID w/meals     haloperidol  1 mg Oral At Bedtime     levothyroxine  88 mcg Oral Daily     megestrol  20 mg Oral Daily     memantine  10 mg Oral BID     [Held by provider] metoprolol tartrate  25 mg Oral BID     mirtazapine  15 mg Oral At Bedtime     salmeterol  1 puff Inhalation BID          Allergies:     Allergies   Allergen Reactions     Ibuprofen      Latex           Labs:     Recent Results (from the past 24 hour(s))   Basic metabolic panel    Collection Time: 01/06/22  8:13 AM   Result Value Ref Range    Sodium 142 133 - 144 mmol/L    Potassium 3.8 3.4 - 5.3 mmol/L    Chloride 116 (H) 94 - 109 mmol/L    Carbon Dioxide (CO2) 19 (L) 20 - 32 mmol/L    Anion Gap 7 3 - 14 mmol/L    Urea Nitrogen 18 7 - 30 mg/dL    Creatinine 0.65 (L) 0.66 - 1.25 mg/dL    Calcium 8.9 8.5 - 10.1 mg/dL    Glucose 99 70 - 99 mg/dL    GFR Estimate >90 >60 mL/min/1.73m2   Magnesium    Collection Time: 01/06/22  8:13 AM   Result Value Ref Range    Magnesium 2.1 1.6 - 2.3 mg/dL   Phosphorus    Collection Time: 01/06/22  8:13 AM   Result Value Ref Range    Phosphorus 3.7 2.5 - 4.5 mg/dL   CBC with platelets and differential    Collection Time: 01/06/22  8:13 AM   Result Value Ref Range    WBC Count 7.6 4.0 - 11.0 10e3/uL    RBC " "Count 4.45 4.40 - 5.90 10e6/uL    Hemoglobin 13.7 13.3 - 17.7 g/dL    Hematocrit 39.9 (L) 40.0 - 53.0 %    MCV 90 78 - 100 fL    MCH 30.8 26.5 - 33.0 pg    MCHC 34.3 31.5 - 36.5 g/dL    RDW 15.7 (H) 10.0 - 15.0 %    Platelet Count 211 150 - 450 10e3/uL          Psychiatric Examination:     /75   Pulse 117   Temp 98.1  F (36.7  C) (Oral)   Resp 16   Ht 1.829 m (6')   Wt 73.3 kg (161 lb 8 oz)   SpO2 96%   BMI 21.90 kg/m    Weight is 161 lbs 8 oz  Body mass index is 21.9 kg/m .  Orthostatic Vitals  Report      Most Recent      Lying Orthostatic /87 01/02 0906    Sitting Orthostatic /87 01/02 0906    Sitting Orthostatic Pulse (bpm) 84 01/02 0906            Appearance: fatigued  Attitude:  more cooperative  Eye Contact:  fair  Mood:  \"tired\"  Affect:  intensity is flat  Speech:  paucity of speech  Psychomotor Behavior:  no evidence of tardive dyskinesia, dystonia, or tics  Thought Process:  concrete  Associations:  no loose associations  Thought Content:  no evidence of suicidal ideation or homicidal ideation and obsessions present  Insight:  partial  Judgement:  limited  Oriented to:  person only  Attention Span and Concentration:  fair  Recent and Remote Memory:  poor    Clinical Global Impressions  First:  Considering your total clinical experience with this particular patient population, how severe are the patient's symptoms at this time?: 7 (07/01/21 0630)  Compared to the patient's condition at the START of treatment, this patient's condition is: 4 (07/01/21 0630)  Most recent:  Considering your total clinical experience with this particular patient population, how severe are the patient's symptoms at this time?: 4 (01/03/22 1344)  Compared to the patient's condition at the START of treatment, this patient's condition is: 2 (01/03/22 1344)         Precautions:     Behavioral Orders   Procedures     Assault precautions     Cheeking Precautions (behavioral units)     Patient Observed " swallowing PO medications; Patient asked to drink water after swallowing medication; Patient in Staff line of sight for 15 minutes after medication given; Mouth checks after PO administration (patient asked to open mouth and stick out their tongue).     Code 1     Code 2     With Security for any imaging/testing needed.     Elopement precautions     Fall precautions     Routine Programming     As clinically indicated     Single Room     Status 15     Every 15 minutes.          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   2) Continue Remeron, Buspar and gabapentin.   3) Continue Aricept and Namenda.   4) Patient is committed with Yanez.   5) [facility] and [facility] is working on guardianship and placement.   6) Medicine and nutrition following peripherally. Patient has improved eating and drinking.       Disposition Plan   Reason for ongoing admission: is unable to care for self due to severe psychosis or mariusz  Discharge location: group home  Discharge Medications: not ordered  Follow-up Appointments: not scheduled  Legal Status: full commitment    Entered by: Wilton Morfin on January 6, 2022 at 10:21 AM

## 2022-01-07 PROCEDURE — 124N000003 HC R&B MH SENIOR/ADOLESCENT

## 2022-01-07 PROCEDURE — 250N000013 HC RX MED GY IP 250 OP 250 PS 637: Performed by: PSYCHIATRY & NEUROLOGY

## 2022-01-07 PROCEDURE — 99233 SBSQ HOSP IP/OBS HIGH 50: CPT | Performed by: PSYCHIATRY & NEUROLOGY

## 2022-01-07 RX ADMIN — ATORVASTATIN CALCIUM 80 MG: 80 TABLET, FILM COATED ORAL at 20:57

## 2022-01-07 RX ADMIN — CLOZAPINE 200 MG: 100 TABLET ORAL at 20:57

## 2022-01-07 RX ADMIN — MEMANTINE 10 MG: 10 TABLET ORAL at 08:59

## 2022-01-07 RX ADMIN — GABAPENTIN 100 MG: 100 CAPSULE ORAL at 08:59

## 2022-01-07 RX ADMIN — BUSPIRONE HYDROCHLORIDE 30 MG: 15 TABLET ORAL at 20:57

## 2022-01-07 RX ADMIN — MEMANTINE 10 MG: 10 TABLET ORAL at 20:57

## 2022-01-07 RX ADMIN — MIRTAZAPINE 15 MG: 15 TABLET, FILM COATED ORAL at 20:57

## 2022-01-07 RX ADMIN — MEGESTROL ACETATE 20 MG: 20 TABLET ORAL at 08:59

## 2022-01-07 RX ADMIN — SALMETEROL XINAFOATE 1 PUFF: 50 POWDER, METERED ORAL; RESPIRATORY (INHALATION) at 20:57

## 2022-01-07 RX ADMIN — HALOPERIDOL 1 MG: 1 TABLET ORAL at 20:57

## 2022-01-07 RX ADMIN — DONEPEZIL HYDROCHLORIDE 10 MG: 10 TABLET ORAL at 20:57

## 2022-01-07 RX ADMIN — GABAPENTIN 100 MG: 100 CAPSULE ORAL at 13:15

## 2022-01-07 RX ADMIN — BUSPIRONE HYDROCHLORIDE 30 MG: 15 TABLET ORAL at 08:59

## 2022-01-07 RX ADMIN — LEVOTHYROXINE SODIUM 88 MCG: 88 TABLET ORAL at 08:59

## 2022-01-07 RX ADMIN — ASPIRIN 81 MG CHEWABLE TABLET 81 MG: 81 TABLET CHEWABLE at 08:59

## 2022-01-07 RX ADMIN — SALMETEROL XINAFOATE 1 PUFF: 50 POWDER, METERED ORAL; RESPIRATORY (INHALATION) at 09:01

## 2022-01-07 RX ADMIN — GABAPENTIN 100 MG: 100 CAPSULE ORAL at 17:26

## 2022-01-07 ASSESSMENT — ACTIVITIES OF DAILY LIVING (ADL)
ORAL_HYGIENE: PROMPTS
LAUNDRY: UNABLE TO COMPLETE
LAUNDRY: UNABLE TO COMPLETE
DRESS: SCRUBS (BEHAVIORAL HEALTH);INDEPENDENT
HYGIENE/GROOMING: WITH ASSISTANCE;PROMPTS
DRESS: SCRUBS (BEHAVIORAL HEALTH)
ORAL_HYGIENE: PROMPTS
HYGIENE/GROOMING: WITH ASSISTANCE

## 2022-01-07 NOTE — PLAN OF CARE
"Assessment/Intervention/Current Symptoms and Care Coordination:    Current Symptoms include the following: Confusion, delusional thinking, irritability, oriented to only self, and to other, and poor nutrition. Pt is improving in terms of coming out in the milieu and having his food there. Still isolating.     Chart Review    Rounded with team     Discharge Plan or Goal:  Pending stabilization & development of a safe discharge plan.  Considerations include: Nursing home. Guardianship process is being pursued. Awaiting the outcome of new [employer] \"Going Home Program\"      Barriers to Discharge:  Patient requires further psychiatric stabilization due to current symptomology, Medication management with possible adjustments and guardianship     Referral Status:  No new referrals made.     Legal Status:  Patient is under MI commitment in [facility] with Yanez. [id number]     Contacts:  Case Management Services:  [facility]   Kurtis Chapin. Phone: [phone]  Supervisor is [email or web address]     Freida Machuca    [phone]  "
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
## 2022-01-07 NOTE — PROGRESS NOTES
NST FLYER/EKG Tech went to unit to follow up on if EKG had been done. Staff on unit said that patient had refused the day before. This writer asked if they wanted this writer to try to get EKG and that I would need a copy of the order. Staff had issues with printer to print the order. Staff also said they doubted that patient would allow the EKG to be done. This writer asked the staff to please call the EKG phone at *[phone] if patient was willing to do EKG.

## 2022-01-07 NOTE — PLAN OF CARE
Problem: Sleep Disturbance  Goal: Adequate Sleep/Rest  Outcome: No Change     Patient slept a total of 10 hours. O2 sat-98% @ R.A. Pulse-101/min. No behavioral concerns noted the whole shift.

## 2022-01-07 NOTE — PLAN OF CARE
Pt presents with blunted, flat affect and calm mood. Denies SI/SIB, not noted to be responding to internal stimuli this evening. Denies depression and anxiety. Speech continues to be rambling, hoarse, and at times incoherent. Continues to talk about Bridgette Lawson and how she put him in here. Alert only to self. Continues to think he is in [facility] - he is reminded each time that he is at [facility] but quickly forgets, within a few minutes he is asking the same question again. Remained mostly isolated to his room, but did come out shortly around 1845 and consumed about 50% of his dinner tray. Encouraged pt to push fluids this evening and he had about 1040 mL in. Pt is voiding on his own but will not use a hat or urinal so measuring output is difficult. Pt was asked about his last BM and he reported last night. Continues to refuse to participate in ADLs and will not allow writer to perform a skin check. Pt is compliant with all scheduled medications. VS much improved since day shift, HR has decreased and BP has come up some - most recent /76 . All other VS WNL. Pt requires frequent encouragement and offering of fluids. Pt did have IV fluids and EKG ordered, but he refused both of these. Denies pain. Compliant with all scheduled medications. No other concerns or complaints noted this evening.

## 2022-01-07 NOTE — PROGRESS NOTES
"[facility], [place]   Psychiatric Progress Note        Interim History:   The patient's care was discussed with the treatment team during the daily team meeting and/or staff's chart notes were reviewed.  Staff report patient refused IV fluids but has been drinking more. Denies SI or SIB. Denies AH. Oriented to person - believes he is in [place].     The patient reports that he is good. Sleeping \"okay.\" Says that he is trying to drink and eat more. Denies SI.          Medications:       sodium chloride 0.9%  1,000 mL Intravenous Once     aspirin  81 mg Oral Daily     atorvastatin  80 mg Oral At Bedtime     busPIRone  30 mg Oral BID     [Held by provider] cholecalciferol  1,250 mcg Oral Q7 Days     cloZAPine  200 mg Oral At Bedtime     donepezil  10 mg Oral At Bedtime     gabapentin  100 mg Oral TID w/meals     haloperidol  1 mg Oral At Bedtime     levothyroxine  88 mcg Oral Daily     megestrol  20 mg Oral Daily     memantine  10 mg Oral BID     [Held by provider] metoprolol tartrate  25 mg Oral BID     mirtazapine  15 mg Oral At Bedtime     salmeterol  1 puff Inhalation BID     sodium chloride (PF)  3 mL Intracatheter Q8H          Allergies:     Allergies   Allergen Reactions     Ibuprofen      Latex           Labs:     No results found for this or any previous visit (from the past 24 hour(s)).       Psychiatric Examination:     /72 (BP Location: Right arm)   Pulse 108   Temp 98.6  F (37  C) (Oral)   Resp 16   Ht 1.829 m (6')   Wt 74.2 kg (163 lb 8 oz)   SpO2 100%   BMI 22.17 kg/m    Weight is 163 lbs 8 oz  Body mass index is 22.17 kg/m .  Orthostatic Vitals  Report      Most Recent      Lying Orthostatic /87 01/02 0906    Sitting Orthostatic /87 01/02 0906    Sitting Orthostatic Pulse (bpm) 84 01/02 0906            Appearance: fatigued  Attitude:  more cooperative  Eye Contact:  fair  Mood:  good  Affect:  intensity is flat  Speech:  paucity of speech  Psychomotor " Behavior:  no evidence of tardive dyskinesia, dystonia, or tics  Thought Process:  concrete  Associations:  no loose associations  Thought Content:  no evidence of suicidal ideation or homicidal ideation and obsessions present  Insight:  partial  Judgement:  limited  Oriented to:  person only  Attention Span and Concentration:  fair  Recent and Remote Memory:  poor    Clinical Global Impressions  First:  Considering your total clinical experience with this particular patient population, how severe are the patient's symptoms at this time?: 7 (07/01/21 0630)  Compared to the patient's condition at the START of treatment, this patient's condition is: 4 (07/01/21 0630)  Most recent:  Considering your total clinical experience with this particular patient population, how severe are the patient's symptoms at this time?: 4 (01/03/22 1344)  Compared to the patient's condition at the START of treatment, this patient's condition is: 2 (01/03/22 1344)         Precautions:     Behavioral Orders   Procedures     Assault precautions     Cheeking Precautions (behavioral units)     Patient Observed swallowing PO medications; Patient asked to drink water after swallowing medication; Patient in Staff line of sight for 15 minutes after medication given; Mouth checks after PO administration (patient asked to open mouth and stick out their tongue).     Code 1     Code 2     With Security for any imaging/testing needed.     Elopement precautions     Fall precautions     Routine Programming     As clinically indicated     Single Room     Status 15     Every 15 minutes.          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   2) Continue Remeron, Buspar and gabapentin.   3) Continue Aricept and Namenda.   4)  Patient is committed with Yanez.   5) [facility] and [facility] legal is working on guardianship and placement.   6) Medicine and nutrition following peripherally.       Disposition Plan   Reason for ongoing admission: is unable to care for self due to severe psychosis or mariusz  Discharge location: group home  Discharge Medications: not ordered  Follow-up Appointments: not scheduled  Legal Status: full commitment    Entered by: Wilton Morfin on January 7, 2022 at 10:14 AM

## 2022-01-07 NOTE — PLAN OF CARE
BEHAVIORAL TEAM DISCUSSION  Participants:   Dr. Wilton Morfin MD; Tyra Paredes, RN; LASHAY Oswald; COLETTE Luu [facility]  Progress:   Pt was transferred from [facility] for treatment on an inpatient psychiatry unit.   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 He has a history of trying to elope from [facility].   Pt has continued to be isolative on the unit; he is not attending groups and continues to speak incoherently.     Anticipated length of stay: Unknown    Continued Stay Criteria/Rationale: Delusional, confused, lacks orientation to place.  Medical/Physical:   Precautions:         Precautions:           Behavioral Orders   Procedures     Assault precautions     Cheeking Precautions (behavioral units)       Patient Observed swallowing PO medications; Patient asked to drink water after swallowing medication; Patient in Staff line of sight for 15 minutes after medication given; Mouth checks after PO administration (patient asked to open mouth and stick out their tongue).     Code 1     Code 2       With Security for any imaging/testing needed.     Elopement precautions     Fall precautions     Routine Programming       As clinically indicated     Single Room     Status 15       Every 15 minutes.              Medications:        sodium chloride 0.9%  1,000 mL Intravenous Once     aspirin  81 mg Oral Daily     atorvastatin  80 mg Oral At Bedtime     busPIRone  30 mg Oral BID     [Held by provider] cholecalciferol  1,250 mcg Oral Q7 Days     cloZAPine  200 mg Oral At Bedtime     donepezil  10 mg Oral At Bedtime     gabapentin  100 mg Oral TID w/meals     haloperidol  1 mg Oral At Bedtime     levothyroxine  88 mcg Oral Daily     megestrol  20 mg Oral Daily     memantine  10 mg Oral BID     [Held by provider] metoprolol tartrate  25 mg Oral BID     mirtazapine  15 mg Oral At Bedtime     salmeterol  1 puff Inhalation BID     sodium chloride  (PF)  3 mL Intracatheter Q8H               Plan:      1) Continue Clozaril and Haldol.   2) Continue Remeron, Buspar and gabapentin.   3) Continue Aricept and Namenda.   4) Patient is committed with Yanez.   5) [facility] legal is working on guardianship and placement.   6) Medicine and nutrition following peripherally.         Disposition Plan   Reason for ongoing admission: is unable to care for self due to severe psychosis or mariusz  Discharge location: group home  Discharge Medications: not ordered  Follow-up Appointments: not scheduled  Legal Status: full commitment

## 2022-01-08 PROCEDURE — 250N000013 HC RX MED GY IP 250 OP 250 PS 637: Performed by: PSYCHIATRY & NEUROLOGY

## 2022-01-08 PROCEDURE — 124N000003 HC R&B MH SENIOR/ADOLESCENT

## 2022-01-08 RX ADMIN — GABAPENTIN 100 MG: 100 CAPSULE ORAL at 12:09

## 2022-01-08 RX ADMIN — ASPIRIN 81 MG CHEWABLE TABLET 81 MG: 81 TABLET CHEWABLE at 08:38

## 2022-01-08 RX ADMIN — MEMANTINE 10 MG: 10 TABLET ORAL at 08:38

## 2022-01-08 RX ADMIN — LEVOTHYROXINE SODIUM 88 MCG: 88 TABLET ORAL at 08:38

## 2022-01-08 RX ADMIN — MEMANTINE 10 MG: 10 TABLET ORAL at 20:35

## 2022-01-08 RX ADMIN — HALOPERIDOL 1 MG: 1 TABLET ORAL at 20:35

## 2022-01-08 RX ADMIN — GABAPENTIN 100 MG: 100 CAPSULE ORAL at 18:33

## 2022-01-08 RX ADMIN — ATORVASTATIN CALCIUM 80 MG: 80 TABLET, FILM COATED ORAL at 20:35

## 2022-01-08 RX ADMIN — MIRTAZAPINE 15 MG: 15 TABLET, FILM COATED ORAL at 20:35

## 2022-01-08 RX ADMIN — SALMETEROL XINAFOATE 1 PUFF: 50 POWDER, METERED ORAL; RESPIRATORY (INHALATION) at 20:36

## 2022-01-08 RX ADMIN — CLOZAPINE 200 MG: 100 TABLET ORAL at 20:35

## 2022-01-08 RX ADMIN — BUSPIRONE HYDROCHLORIDE 30 MG: 15 TABLET ORAL at 20:35

## 2022-01-08 RX ADMIN — SALMETEROL XINAFOATE 1 PUFF: 50 POWDER, METERED ORAL; RESPIRATORY (INHALATION) at 08:38

## 2022-01-08 RX ADMIN — GABAPENTIN 100 MG: 100 CAPSULE ORAL at 08:38

## 2022-01-08 RX ADMIN — MEGESTROL ACETATE 20 MG: 20 TABLET ORAL at 08:38

## 2022-01-08 RX ADMIN — DONEPEZIL HYDROCHLORIDE 10 MG: 10 TABLET ORAL at 20:35

## 2022-01-08 RX ADMIN — BUSPIRONE HYDROCHLORIDE 30 MG: 15 TABLET ORAL at 08:38

## 2022-01-08 ASSESSMENT — ACTIVITIES OF DAILY LIVING (ADL)
DRESS: SCRUBS (BEHAVIORAL HEALTH);PROMPTS
ORAL_HYGIENE: PROMPTS
ORAL_HYGIENE: PROMPTS
DRESS: SCRUBS (BEHAVIORAL HEALTH);PROMPTS
HYGIENE/GROOMING: PROMPTS
HYGIENE/GROOMING: PROMPTS

## 2022-01-08 NOTE — PLAN OF CARE
"  Problem: Behavioral Health Plan of Care  Goal: Absence of New-Onset Illness or Injury  Intervention: Identify and Manage Fall Risk  Recent Flowsheet Documentation  Taken 2022 1226 by Una Gaytan RN  Safety Measures:   environmental rounds completed   safety rounds completed     Problem: Cognitive Impairment  Goal: Optimal Functional [place]  Intervention: Optimize Cognitive Function  Flowsheets (Taken 2022 1239)  Sensory Stimulation Regulation: quiet environment promoted  Reorientation Measures: reorientation provided  Self-Care Promotion:   independence encouraged   meal set-up provided    Pt continues to be oriented to self. Pt is aware that he is in a hospital but will state [facility]. When oriented to [place] his response was \"[place] [facility]\". Pt asked writer if he was in \"[facility]\". Pt has poor insight into his situation. Pt asked writer why he was still here. Pt voiced that his commitment had  in  and that he wanted to go home. Pt is able to be distracted. Pt has not recall of recommitment from 2021.   Pt continues to isolate to his room but comes out for brief periods. Pt appetite is poor and has only taken about 25-30% of meals this shift. Pt continues to need prompts/reminders to drink fluids.  Pt needs SBA and prompts to complete ADL's. Pt refused a shower this AM. Pt did changed scrubs/pull up/sweatshirt and socks this AM with prompts. Pt also completed jeanie care with prompts and used a moist swab to swab his mouth. Pt allowed writer to lotion his feet prior to clean socks being places.   Pt has come for both meals.   "

## 2022-01-08 NOTE — PLAN OF CARE
Patient with O2 of 95% R.A and pulse of 93.  Patient has remained in his room sleeping comfortably for the entire shift.  Up to restroom X1. 10.50 hrs of sleep.  No concerns reported.

## 2022-01-08 NOTE — PLAN OF CARE
Problem: Psychotic Symptoms  Goal: Social and Therapeutic (Psychotic Symptoms)  Description: Signs and symptoms of listed problems will be absent or manageable.  Outcome: No Change     Problem: Cognitive Impairment (Psychotic Signs/Symptoms)  Goal: Optimal Cognitive Function (Psychotic Signs/Symptoms)  Outcome: No Change  Intervention: Support and Promote Cognitive Ability  Recent Flowsheet Documentation  Taken 1/7/2022 1851 by Jonelle Delatorre RN  Trust Relationship/Rapport:    choices provided    questions answered    questions encouraged    thoughts/feelings acknowledged    Patient was visible in the milieu half of the shift. He was watching TV. He declined to attend the group activities. He asked this writer twice, where he is right now. He seemed to be forgetful. Patient encouraged to drink more fluids. His BP-113/81; and P- 91. No dizziness nor lightheadedness reported nor observed. His mood is calm with a flat affect. He looked untidy and disheveled. He is denied SI/SIB nor hallucinations. Denied wishing himself to be dead. Patient only ate about 50 % of his meal at dinner time. His total fluid intake was 720 ml and voided only 2x. Med compliant. He went to bed early.

## 2022-01-09 PROCEDURE — 250N000013 HC RX MED GY IP 250 OP 250 PS 637: Performed by: PSYCHIATRY & NEUROLOGY

## 2022-01-09 PROCEDURE — 124N000003 HC R&B MH SENIOR/ADOLESCENT

## 2022-01-09 RX ADMIN — ASPIRIN 81 MG CHEWABLE TABLET 81 MG: 81 TABLET CHEWABLE at 08:53

## 2022-01-09 RX ADMIN — LEVOTHYROXINE SODIUM 88 MCG: 88 TABLET ORAL at 08:53

## 2022-01-09 RX ADMIN — SALMETEROL XINAFOATE 1 PUFF: 50 POWDER, METERED ORAL; RESPIRATORY (INHALATION) at 08:53

## 2022-01-09 RX ADMIN — MEMANTINE 10 MG: 10 TABLET ORAL at 19:54

## 2022-01-09 RX ADMIN — MIRTAZAPINE 15 MG: 15 TABLET, FILM COATED ORAL at 19:54

## 2022-01-09 RX ADMIN — BENZOCAINE AND MENTHOL 1 LOZENGE: 15; 3.6 LOZENGE ORAL at 06:47

## 2022-01-09 RX ADMIN — MEMANTINE 10 MG: 10 TABLET ORAL at 08:53

## 2022-01-09 RX ADMIN — HALOPERIDOL 1 MG: 1 TABLET ORAL at 19:54

## 2022-01-09 RX ADMIN — ATORVASTATIN CALCIUM 80 MG: 80 TABLET, FILM COATED ORAL at 19:54

## 2022-01-09 RX ADMIN — BUSPIRONE HYDROCHLORIDE 30 MG: 15 TABLET ORAL at 19:54

## 2022-01-09 RX ADMIN — CLOZAPINE 200 MG: 100 TABLET ORAL at 19:54

## 2022-01-09 RX ADMIN — GABAPENTIN 100 MG: 100 CAPSULE ORAL at 08:53

## 2022-01-09 RX ADMIN — DONEPEZIL HYDROCHLORIDE 10 MG: 10 TABLET ORAL at 19:54

## 2022-01-09 RX ADMIN — BUSPIRONE HYDROCHLORIDE 30 MG: 15 TABLET ORAL at 08:53

## 2022-01-09 RX ADMIN — SALMETEROL XINAFOATE 1 PUFF: 50 POWDER, METERED ORAL; RESPIRATORY (INHALATION) at 19:54

## 2022-01-09 RX ADMIN — MEGESTROL ACETATE 20 MG: 20 TABLET ORAL at 08:53

## 2022-01-09 RX ADMIN — GABAPENTIN 100 MG: 100 CAPSULE ORAL at 12:05

## 2022-01-09 RX ADMIN — GABAPENTIN 100 MG: 100 CAPSULE ORAL at 17:19

## 2022-01-09 ASSESSMENT — ACTIVITIES OF DAILY LIVING (ADL)
HYGIENE/GROOMING: PROMPTS
HYGIENE/GROOMING: PROMPTS
LAUNDRY: UNABLE TO COMPLETE
DRESS: PROMPTS;SCRUBS (BEHAVIORAL HEALTH)
ORAL_HYGIENE: PROMPTS
DRESS: PROMPTS
ORAL_HYGIENE: PROMPTS

## 2022-01-09 ASSESSMENT — MIFFLIN-ST. JEOR: SCORE: 1598.72

## 2022-01-09 NOTE — PLAN OF CARE
Patient has remained in his room sleeping comfortably for the entire shift.  Up to the restroom X1 overnight.  97 % O2 sat R.A, P 78. No complaints overnight.  PRN Cepacol given at 0630. Coughing this AM.  Denies sore throat. Patient slept for 9.25 hrs.

## 2022-01-09 NOTE — PLAN OF CARE
"  Problem: Behavioral Disturbance  Goal: Behavioral Disturbance  Description: Signs and symptoms of listed problems will be absent or manageable by discharge or transition of care.  Outcome: Improving  Flowsheets  Taken 1/8/2022 2204 by Wen Broderick RN  Behavioral Disturbance Present:   moodNursing Assessment    Behavior disturbance [F91.9]    Admit Date: 6/30/2021    Length of Stay: 192    Patient evaluation continues. Assessed mood,anxiety,thoughts and behavior. Patient is progressing towards goals. Patient is encouraged to participate in groups and assisted to develop healthy coping skills.  Patient denies auditory or visual hallucinations. /79 (Patient Position: Sitting)   Pulse 113   Temp 97.4  F (36.3  C) (Oral)   Resp 16   Ht 1.829 m (6')   Wt 74.2 kg (163 lb 8 oz)   SpO2 98%   BMI 22.17 kg/m      Mood: \" I dont know\". Non verbal much of the time    Patient reports depression none and reports anxiety none    Affect:flat and blunted    Sleep: naps much of the day and sleep 8-10 hours each night    Appetite: good    SI: denies    HI: denies    SIB: denies      Medication Compliance : medication complaint    Group participation:none    ADL's: independent with prompts    Fall risk interventions: proper foot wear and orthostatic B/p    Rinku Score Interventions:none    Discharge planning in process    Refer to daily team meeting notes for individualized plan of care. Nursing will continue to assess.    *Scale is 1-10 and 10 is the worst.          sleep   insight   thought process   affect  Taken 1/5/2022 1147 by Una Gaytan RN  Behavioral Disturbance Assessed: all     "

## 2022-01-09 NOTE — PLAN OF CARE
Problem: Psychotic Symptoms  Goal: Social and Therapeutic (Psychotic Symptoms)  Description: Signs and symptoms of listed problems will be absent or manageable.  Recent Flowsheet Documentation  Taken 1/9/2022 1020 by Una Gaytan RN  Psychotic Symptoms Assessed: all  Psychotic Symptoms Present:    insight    thought process    speech    memory deficits    orientation    Pt was approached to have weekly covid swab. Pt refused.    Pt was up early for breakfast today and ate approximately 25%. Pt did drink 200 ml of 1% milk and 180 ml of chocolate milk. Pt's blood pressure noted to be on the low side (sitting 99/68 with a pulse of 92 and standing 97/71 with a pulse of 97). Pt denied dizziness. Pt returned to his room quickly after eating. Pt was medication compliant with AM medications.    Pt has declined to change his clothing or shower. Pt is oriented to self and a hospital. Pt's eye contact is inconsistent, his affect is flat without irritability this shift and his speech can be clear at times but at other times mumbled.     Pt came out for lunch and ate 75% of his lunch including 480 ml of milk.  Pt's blood pressure was rechecked after lunch. (127/89 with a pulse of 100 sitting and O2 sat of 99.

## 2022-01-10 PROCEDURE — 250N000013 HC RX MED GY IP 250 OP 250 PS 637: Performed by: PSYCHIATRY & NEUROLOGY

## 2022-01-10 PROCEDURE — 124N000003 HC R&B MH SENIOR/ADOLESCENT

## 2022-01-10 PROCEDURE — 99233 SBSQ HOSP IP/OBS HIGH 50: CPT | Performed by: PSYCHIATRY & NEUROLOGY

## 2022-01-10 RX ADMIN — ASPIRIN 81 MG CHEWABLE TABLET 81 MG: 81 TABLET CHEWABLE at 08:43

## 2022-01-10 RX ADMIN — MIRTAZAPINE 15 MG: 15 TABLET, FILM COATED ORAL at 19:41

## 2022-01-10 RX ADMIN — GABAPENTIN 100 MG: 100 CAPSULE ORAL at 17:36

## 2022-01-10 RX ADMIN — HALOPERIDOL 1 MG: 1 TABLET ORAL at 19:42

## 2022-01-10 RX ADMIN — BUSPIRONE HYDROCHLORIDE 30 MG: 15 TABLET ORAL at 19:41

## 2022-01-10 RX ADMIN — BUSPIRONE HYDROCHLORIDE 30 MG: 15 TABLET ORAL at 08:43

## 2022-01-10 RX ADMIN — SALMETEROL XINAFOATE 1 PUFF: 50 POWDER, METERED ORAL; RESPIRATORY (INHALATION) at 08:44

## 2022-01-10 RX ADMIN — MEMANTINE 10 MG: 10 TABLET ORAL at 19:41

## 2022-01-10 RX ADMIN — MEGESTROL ACETATE 20 MG: 20 TABLET ORAL at 08:43

## 2022-01-10 RX ADMIN — ATORVASTATIN CALCIUM 80 MG: 80 TABLET, FILM COATED ORAL at 19:41

## 2022-01-10 RX ADMIN — LEVOTHYROXINE SODIUM 88 MCG: 88 TABLET ORAL at 08:43

## 2022-01-10 RX ADMIN — GABAPENTIN 100 MG: 100 CAPSULE ORAL at 12:02

## 2022-01-10 RX ADMIN — GABAPENTIN 100 MG: 100 CAPSULE ORAL at 08:43

## 2022-01-10 RX ADMIN — DONEPEZIL HYDROCHLORIDE 10 MG: 10 TABLET ORAL at 19:41

## 2022-01-10 RX ADMIN — SALMETEROL XINAFOATE 1 PUFF: 50 POWDER, METERED ORAL; RESPIRATORY (INHALATION) at 19:47

## 2022-01-10 RX ADMIN — MEMANTINE 10 MG: 10 TABLET ORAL at 08:44

## 2022-01-10 RX ADMIN — CLOZAPINE 200 MG: 100 TABLET ORAL at 19:41

## 2022-01-10 ASSESSMENT — ACTIVITIES OF DAILY LIVING (ADL)
HYGIENE/GROOMING: PROMPTS
ORAL_HYGIENE: PROMPTS
HYGIENE/GROOMING: PROMPTS
DRESS: PROMPTS
LAUNDRY: WITH SUPERVISION
DRESS: PROMPTS
LAUNDRY: UNABLE TO COMPLETE
ORAL_HYGIENE: PROMPTS

## 2022-01-10 NOTE — PROGRESS NOTES
"[facility], [place]   Psychiatric Progress Note        Interim History:   The patient's care was discussed with the treatment team during the daily team meeting and/or staff's chart notes were reviewed.  Staff report patient has been about the same. Is eating and drinking some.     The patient reports that he is \"all right.\" Is walking down the cruz after eating breakfast. Does report that he is eating better. Denies problems with mood or sleep. Denies SI. Asks this provider, \"When am I getting out?\" Discussed that we are getting closer and that he is helping by eating and drinking. Encouraged him to shower and shave.          Medications:       sodium chloride 0.9%  1,000 mL Intravenous Once     aspirin  81 mg Oral Daily     atorvastatin  80 mg Oral At Bedtime     busPIRone  30 mg Oral BID     [Held by provider] cholecalciferol  1,250 mcg Oral Q7 Days     cloZAPine  200 mg Oral At Bedtime     donepezil  10 mg Oral At Bedtime     gabapentin  100 mg Oral TID w/meals     haloperidol  1 mg Oral At Bedtime     levothyroxine  88 mcg Oral Daily     megestrol  20 mg Oral Daily     memantine  10 mg Oral BID     [Held by provider] metoprolol tartrate  25 mg Oral BID     mirtazapine  15 mg Oral At Bedtime     salmeterol  1 puff Inhalation BID     sodium chloride (PF)  3 mL Intracatheter Q8H          Allergies:     Allergies   Allergen Reactions     Ibuprofen      Latex           Labs:     No results found for this or any previous visit (from the past 24 hour(s)).       Psychiatric Examination:     /77   Pulse (!) 123   Temp 97.7  F (36.5  C)   Resp 18   Ht 1.829 m (6')   Wt 74.1 kg (163 lb 4.8 oz)   SpO2 99%   BMI 22.15 kg/m    Weight is 163 lbs 4.8 oz  Body mass index is 22.15 kg/m .  Orthostatic Vitals  Report      Most Recent      Lying Orthostatic /87 01/02 0906    Sitting Orthostatic /87 01/02 0906    Sitting Orthostatic Pulse (bpm) 84 01/02 0906    "         Appearance: awake, alert and poorly groomed  Attitude:  more cooperative  Eye Contact:  fair  Mood:  good  Affect:  intensity is flat, more full at times  Speech:  mumbling  Psychomotor Behavior:  no evidence of tardive dyskinesia, dystonia, or tics  Thought Process:  concrete  Associations:  no loose associations  Thought Content:  no evidence of suicidal ideation or homicidal ideation and obsessions present  Insight:  partial  Judgement:  limited  Oriented to:  person only  Attention Span and Concentration:  fair  Recent and Remote Memory:  poor    Clinical Global Impressions  First:  Considering your total clinical experience with this particular patient population, how severe are the patient's symptoms at this time?: 7 (07/01/21 0630)  Compared to the patient's condition at the START of treatment, this patient's condition is: 4 (07/01/21 0630)  Most recent:  Considering your total clinical experience with this particular patient population, how severe are the patient's symptoms at this time?: 4 (01/03/22 1344)  Compared to the patient's condition at the START of treatment, this patient's condition is: 2 (01/03/22 1344)         Precautions:     Behavioral Orders   Procedures     Assault precautions     Cheeking Precautions (behavioral units)     Patient Observed swallowing PO medications; Patient asked to drink water after swallowing medication; Patient in Staff line of sight for 15 minutes after medication given; Mouth checks after PO administration (patient asked to open mouth and stick out their tongue).     Code 1     Code 2     With Security for any imaging/testing needed.     Elopement precautions     Fall precautions     Routine Programming     As clinically indicated     Single Room     Status 15     Every 15 minutes.          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   2) Continue Remeron, Buspar and gabapentin.   3) Continue Aricept and Namenda.   4) Patient is committed with Yanez.   5) [facility] legal is working on guardianship and placement.   6) Medicine and nutrition following peripherally.       Disposition Plan   Reason for ongoing admission: is unable to care for self due to severe psychosis or mariusz  Discharge location: group home  Discharge Medications: not ordered  Follow-up Appointments: not scheduled  Legal Status: full commitment    Entered by: Wilton Morfin on January 10, 2022 at 11:56 AM

## 2022-01-10 NOTE — PLAN OF CARE
Problem: Psychotic Symptoms  Goal: Psychotic Symptoms  Description: Signs and symptoms of listed problems will be absent or manageable.  Outcome: No Change    Pt has been isolative and irritable at times. Pt refused to have labs drawn this AM. Pt would only come out for meals this shift. Pt took approximately 25% of meals.  Pt is medication compliant.   Pt is oriented only to self. Pt has poor insight into his mental health/coginitve issues. Pt reports that he wants to leave and does not understand why is still in the hospital.

## 2022-01-10 NOTE — PLAN OF CARE
"Assessment/Intervention/Current Symptoms and Care Coordination:    Current Symptoms include the following: Confusion, delusional thinking, irritability, oriented to only self, and to other, and poor nutrition. Pt is improving in terms of coming out in the milieu and having his food there. Still isolating.     Chart Review    Rounded with team     Discharge Plan or Goal:  Pending stabilization & development of a safe discharge plan.  Considerations include: Nursing home. Guardianship process is being pursued. Awaiting the outcome of new DHS \"Going Home Program\"      Barriers to Discharge:  Patient requires further psychiatric stabilization due to current symptomology, Medication management with possible adjustments and guardianship     Referral Status:  No new referrals made.     Legal Status:  Patient is under MI commitment in [facility] with Yanez. [id number]     Contacts:  Case Management Services:  [facility]   Kurtis Chapin. Phone: [phone]  Supervisor is [email or web address]     Freida Machuca    [phone]  "

## 2022-01-10 NOTE — PLAN OF CARE
Problem: Psychotic Symptoms  Goal: Psychotic Symptoms  Description: Signs and symptoms of listed problems will be absent or manageable.  Outcome: No Change     Problem: Psychotic Symptoms  Goal: Social and Therapeutic (Psychotic Symptoms)  Description: Signs and symptoms of listed problems will be absent or manageable.  Outcome: No Change     Patient's mood is calm but with blunt and flat affect. He refused to attend groups. He also did not eat dinner. He said he is not hungry. He wanted to have egg omelette but the kitchen/ dietary is already closed. Writer requested night shift RN to call the dietary early tomorrow morning notifying patient's request. Patient was minimal in his verbal responses. His speech is rambling. He denied SI/SIB nor hallucinations. Denied wishing himself to be dead. He just wants to get out from here (hospital). Patient is alert and oriented x 1 (only to himself). He is not oriented to date; time and place. He again asked where he is right now. He said he is sleeping good at night. Patient's /70 P-105. Intake was 480 ml ; Output-voided x 1.

## 2022-01-10 NOTE — PLAN OF CARE
Patient remained sleeping in his room throughout the shift with hrs of sleep. 98 %O2 R.A, P 114.  No concerns reported.  8 hrs of sleep.

## 2022-01-11 PROCEDURE — 99233 SBSQ HOSP IP/OBS HIGH 50: CPT | Performed by: PSYCHIATRY & NEUROLOGY

## 2022-01-11 PROCEDURE — 124N000003 HC R&B MH SENIOR/ADOLESCENT

## 2022-01-11 PROCEDURE — 250N000013 HC RX MED GY IP 250 OP 250 PS 637: Performed by: PSYCHIATRY & NEUROLOGY

## 2022-01-11 RX ADMIN — HALOPERIDOL 1 MG: 1 TABLET ORAL at 21:03

## 2022-01-11 RX ADMIN — DONEPEZIL HYDROCHLORIDE 10 MG: 10 TABLET ORAL at 21:03

## 2022-01-11 RX ADMIN — BUSPIRONE HYDROCHLORIDE 30 MG: 15 TABLET ORAL at 21:02

## 2022-01-11 RX ADMIN — GABAPENTIN 100 MG: 100 CAPSULE ORAL at 18:25

## 2022-01-11 RX ADMIN — ATORVASTATIN CALCIUM 80 MG: 80 TABLET, FILM COATED ORAL at 21:02

## 2022-01-11 RX ADMIN — GABAPENTIN 100 MG: 100 CAPSULE ORAL at 13:27

## 2022-01-11 RX ADMIN — GABAPENTIN 100 MG: 100 CAPSULE ORAL at 08:14

## 2022-01-11 RX ADMIN — MEMANTINE 10 MG: 10 TABLET ORAL at 08:14

## 2022-01-11 RX ADMIN — SALMETEROL XINAFOATE 1 PUFF: 50 POWDER, METERED ORAL; RESPIRATORY (INHALATION) at 08:22

## 2022-01-11 RX ADMIN — MIRTAZAPINE 15 MG: 15 TABLET, FILM COATED ORAL at 21:02

## 2022-01-11 RX ADMIN — ASPIRIN 81 MG CHEWABLE TABLET 81 MG: 81 TABLET CHEWABLE at 08:14

## 2022-01-11 RX ADMIN — MEGESTROL ACETATE 20 MG: 20 TABLET ORAL at 08:15

## 2022-01-11 RX ADMIN — BUSPIRONE HYDROCHLORIDE 30 MG: 15 TABLET ORAL at 08:14

## 2022-01-11 RX ADMIN — SALMETEROL XINAFOATE 1 PUFF: 50 POWDER, METERED ORAL; RESPIRATORY (INHALATION) at 21:04

## 2022-01-11 RX ADMIN — MEMANTINE 10 MG: 10 TABLET ORAL at 21:02

## 2022-01-11 RX ADMIN — LEVOTHYROXINE SODIUM 88 MCG: 88 TABLET ORAL at 08:15

## 2022-01-11 RX ADMIN — CLOZAPINE 200 MG: 100 TABLET ORAL at 21:01

## 2022-01-11 ASSESSMENT — ACTIVITIES OF DAILY LIVING (ADL)
HYGIENE/GROOMING: PROMPTS
LAUNDRY: WITH SUPERVISION
DRESS: PROMPTS;SCRUBS (BEHAVIORAL HEALTH)
ORAL_HYGIENE: PROMPTS
HYGIENE/GROOMING: PROMPTS
ORAL_HYGIENE: PROMPTS
DRESS: PROMPTS;SCRUBS (BEHAVIORAL HEALTH)

## 2022-01-11 NOTE — PROGRESS NOTES
"[facility], [place]   Psychiatric Progress Note        Interim History:   The patient's care was discussed with the treatment team during the daily team meeting and/or staff's chart notes were reviewed.  Staff report patient has been \"status quo\". Is disheveled and could use a hair cut.     The patient reports that he is \"all right.\" Is somnolent and answers questions briefly. Denies problems with sleep or appetite. Denies SI.          Medications:       sodium chloride 0.9%  1,000 mL Intravenous Once     aspirin  81 mg Oral Daily     atorvastatin  80 mg Oral At Bedtime     busPIRone  30 mg Oral BID     [Held by provider] cholecalciferol  1,250 mcg Oral Q7 Days     cloZAPine  200 mg Oral At Bedtime     donepezil  10 mg Oral At Bedtime     gabapentin  100 mg Oral TID w/meals     haloperidol  1 mg Oral At Bedtime     levothyroxine  88 mcg Oral Daily     megestrol  20 mg Oral Daily     memantine  10 mg Oral BID     [Held by provider] metoprolol tartrate  25 mg Oral BID     mirtazapine  15 mg Oral At Bedtime     salmeterol  1 puff Inhalation BID     sodium chloride (PF)  3 mL Intracatheter Q8H          Allergies:     Allergies   Allergen Reactions     Ibuprofen      Latex           Labs:     No results found for this or any previous visit (from the past 24 hour(s)).       Psychiatric Examination:     /80 (BP Location: Right arm)   Pulse 100   Temp 98.7  F (37.1  C) (Oral)   Resp 16   Ht 1.829 m (6')   Wt 74.1 kg (163 lb 4.8 oz)   SpO2 100%   BMI 22.15 kg/m    Weight is 163 lbs 4.8 oz  Body mass index is 22.15 kg/m .  Orthostatic Vitals  Report      Most Recent      Lying Orthostatic /87 01/02 0906    Sitting Orthostatic /87 01/02 0906    Sitting Orthostatic Pulse (bpm) 84 01/02 0906            Appearance: poorly groomed and fatigued  Attitude:  more cooperative  Eye Contact:  fair  Mood:  good  Affect:  intensity is flat  Speech:  mumbling  Psychomotor Behavior:  no " evidence of tardive dyskinesia, dystonia, or tics  Thought Process:  concrete  Associations:  no loose associations  Thought Content:  no evidence of suicidal ideation or homicidal ideation and obsessions present  Insight:  partial  Judgement:  limited  Oriented to:  person only  Attention Span and Concentration:  fair  Recent and Remote Memory:  poor    Clinical Global Impressions  First:  Considering your total clinical experience with this particular patient population, how severe are the patient's symptoms at this time?: 7 (07/01/21 0630)  Compared to the patient's condition at the START of treatment, this patient's condition is: 4 (07/01/21 0630)  Most recent:  Considering your total clinical experience with this particular patient population, how severe are the patient's symptoms at this time?: 4 (01/03/22 1344)  Compared to the patient's condition at the START of treatment, this patient's condition is: 2 (01/03/22 1344)         Precautions:     Behavioral Orders   Procedures     Assault precautions     Cheeking Precautions (behavioral units)     Patient Observed swallowing PO medications; Patient asked to drink water after swallowing medication; Patient in Staff line of sight for 15 minutes after medication given; Mouth checks after PO administration (patient asked to open mouth and stick out their tongue).     Code 1     Code 2     With Security for any imaging/testing needed.     Elopement precautions     Fall precautions     Routine Programming     As clinically indicated     Single Room     Status 15     Every 15 minutes.          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   2) Continue Remeron, Buspar and gabapentin.   3) Continue Aricept and Namenda.   4) Patient is  committed with Beau.   5) [facility] legal is working on guardianship and placement.   6) Medicine and nutrition following peripherally.   7) Okay for staff to cut hair or trim facial hair if patient agreeable.       Disposition Plan   Reason for ongoing admission: is unable to care for self due to severe psychosis or mariusz  Discharge location: group home  Discharge Medications: not ordered  Follow-up Appointments: not scheduled  Legal Status: full commitment    Entered by: Wilton Morfin on January 11, 2022 at 11:27 AM

## 2022-01-11 NOTE — PLAN OF CARE
Problem: Psychotic Symptoms  Goal: Psychotic Symptoms  Description: Signs and symptoms of listed problems will be absent or manageable.  Outcome: No Change    Patient's mood is calm with a flat and blunt affect. He remains isolative and withdrawn. He provided only minimal answers to questions asked. He mumbles when he talks. Speech is pressured and rambling at times. Patient denied SI/SIB nor hallucinations. He said he is just tired and just wants to be alone and rest. Patient only ate  almost 50%. His intake for both water and juice was 480 ml. He said that he did not void this shift. His /63; P-115; O2 Sat 97%. He is med compliant. No cheeking of meds observed this shift.

## 2022-01-11 NOTE — DISCHARGE INSTRUCTIONS
Behavioral Discharge Planning and Instructions    Summary: You were hospitalized at  [facility] medical unit from 9/9/20 to 6/30/21, You were transferred to [facility] on 6/30/2021  due to need for treatment on a psychiatric unit.  You were treated by  Dr. Mu DO.  You were on a commitment through [facility]. It was decided that a nursing home placement would be most appropriate for you.  You did not have a guardian and the nursing homes were requiring a guardian.  You remained at [facility] while a guardian was sought for you. The efforts to find a guardian were unsuccessful. You started to decline physically. On 11/11/21 you were transferred to the Senior Unit on 3[facility] to give you a change of environment.   You were discharged on ***/***/21 from [facility]  to {[facility] D/C Locations:[id number]}      Main Diagnosis:   Schizophrenia, unspecified  Major neurocognitive disorder secondary to traumatic brain injury and likely substance use by history  Alcohol use disorder in remission.   Stimulant use disorder, in remission.     Health Care Follow-up:   [facility] Member Services - [phone]  [facility] Care Navigation - [phone]  [facility] Inpatient Liaison - Annia Burciaga RN - [phone]      You are on a civil commitment through [facility]. You have a mental health  to help you with the terms of this agreement.  [facility]   Aneesh Chapin  [phone]  Supervisor is [email or web address]        You are on the [facility] waiting list.   at [phone] ([facility]) or [phone] ([facility]).  Fax: [phone]      You are on the wait list for an MSOC Home.  [facility]  MSOC Home Coordinator  Monica Álvarez @ [phone]        Your rep payee needs to be notified when you are discharged from this hospital.   Rep Payee  [employer]  [place]  Madan Gray  [address]  [place] [address]  Phone: [phone]  Fax: [phone]      [address]  [phone]  Fax [phone]  Appointments with Dr. Isaac Watkins MD and MAIA Emerson-CNP         Dr. Dockery of Heart and Vascular Cardiology, last seen in 1/2020.  - Follow-up with outpatient Cardiology upon dismissal from the hospital (on every 2 year follow-ups).            Attend all scheduled appointments with your outpatient providers. Call at least 24 hours in advance if you need to reschedule an appointment to ensure continued access to your outpatient providers.     Major Treatments, Procedures and Findings:  You were provided with: a psychiatric assessment, assessed for medical stability, medication evaluation and/or management, group therapy, milieu management and medical interventions    You received the Covid-19 vaccine wile you were here.  Ember Guerrero, RN   Registered Nurse      Date of Service:  8/24/2021  1:34 PM           []Hide copied text    []Brissa for details  Pt was given the [employer] COVID Vaccine in his left arm . Pt was monitor for 30 Minutes with no reaction noted . Pt refused education sheets about  COVID Vaccine           You received weekly covid testing while you were here. These were all negative.      Symptoms to Report: feeling more aggressive or increased confusion    Early warning signs can include: increased unusual thinking, such as paranoia or hearing voices    Safety and Wellness:  Take all medicines as directed.  Make no changes unless your doctor suggests them.  Follow treatment recommendations. Refrain from alcohol and non-prescribed drugs.  If there is a concern for safety, call 911.    Resources:   Sister Jennifer Roberts @ [phone].      National [place] on Mental Illness (www.[place].org): 226.515.1350 or 286-934-6304.            General Medication Instructions:   See  your medication sheet(s) for instructions.   Take all medicines as directed.  Make no changes unless your doctor suggests them.   Go to all your doctor visits.  Be sure to have all your required lab tests. This way, your medicines can be refilled on time.  Do not use any drugs not prescribed by your doctor.  Avoid alcohol.    Advance Directives:   Scanned document on file with [employer]?    Is document scanned?    Honoring Choices Your Rights Handout:    Was more information offered?      The Treatment team has appreciated the opportunity to work with you. If you have any questions or concerns about your recent admission, you can contact the unit which can receive your call 24 hours a day, 7 days a week. They will be able to get in touch with a Provider if needed. The unit number is [phone].

## 2022-01-11 NOTE — PLAN OF CARE
Problem: Psychotic Symptoms  Goal: Psychotic Symptoms  Description: Signs and symptoms of listed problems will be absent or manageable.  Outcome: No Change  Flowsheets (Taken 1/11/2022 1125)  Psychotic Symptoms Assessed: all  Psychotic Symptoms Present:    mood    affect    speech    thought process    insight    orientation    memory deficits   Pt continues to lack insight into his mental health/cognitive issues/situation. Pt denies SI/SIB/wishes to be dead/hallucinations. Pt is aware he is in a hospital but reports that he is at [facility]. Pt believes that he has a home to go to.   Pt continues to refuse to shower. Pt did do jeanie care with prompts and changed his scrubs and sweatshirt. Pt continues to have a poor appetite. Pt has taken approximately 600 ml of fluids. Pt does like to drink milk.

## 2022-01-11 NOTE — PLAN OF CARE
"Assessment/Intervention/Current Symptoms and Care Coordination:    Current Symptoms include the following: Confusion, delusional thinking, irritability, oriented to only self, and to other, and poor nutrition. Pt is improving in terms of coming out in the milieu and having his food there. Still isolating.     Chart Review    Rounded with team     Discharge Plan or Goal:  Pending stabilization & development of a safe discharge plan.  Considerations include: Nursing home. Guardianship process is being pursued. Awaiting the outcome of new DHS \"Going Home Program\"      Barriers to Discharge:  Patient requires further psychiatric stabilization due to current symptomology, Medication management with possible adjustments and guardianship     Referral Status:  No new referrals made.     Legal Status:  Patient is under MI commitment in [facility] with Yanez. 25-MS-HI-     Contacts:  Case Management Services:  [facility]   Kurtis Chapin. Phone: [phone]  Supervisor is [email or web address]     Freida Machuca    [phone]  "

## 2022-01-11 NOTE — PROGRESS NOTES
CLINICAL NUTRITION SERVICES - REASSESSMENT NOTE     Nutrition Prescription    RECOMMENDATIONS FOR MDs/PROVIDERS TO ORDER:  Will continue holding supplements (has stock on unit) can be resent should pt go through these    Malnutrition Status:    Severe malnutrition in the context of acute on chronic illness     Recommendations already ordered by Registered Dietitian (RD):  None additionally     Future/Additional Recommendations:  D/t no significant changes/new interventions, RD services will plan to monitor progress toward goals q 14 days unless otherwise consulted  From Ethics, no nutrition support options unless pt would voluntarily agree     EVALUATION OF THE PROGRESS TOWARD GOALS   Diet: Mechanical/Dental Soft, double portions of eggs with breakfast   Supplements: Any supplements PRN (when requested by RN)   Intake: 0-50% of meals per flowsheet        NEW FINDINGS   Spoke to RN, reports no major changes in PO however pt is now coming out of his room for meals. They still have stock of supplements on unit, will continue holding off on sending more at this time. Labs and meds reviewed, remains on megace and remeron.   3# (1.8%) wt loss in 1 month, 29# (15.1%) wt loss in 3 months    01/09/22 74.1 kg (163 lb 4.8 oz)   12/12/21 1351 75.6 kg (166 lb 11.2 oz)   11/11/21 0850 79.8 kg (176 lb)   10/12/21 87.4 kg (192 lb 9.6 oz)   09/07/21 0835 90 kg (198 lb 6.4 oz)   09/05/21 0853 90.1 kg (198 lb 9.6 oz)   08/29/21 0800 89.5 kg (197 lb 4.8 oz)   08/18/21 0700 89.4 kg (197 lb 1.6 oz)   08/12/21 0849 89.1 kg (196 lb 8 oz)   08/10/21 0753 89.9 kg (198 lb 1.6 oz)   08/08/21 0932 89.8 kg (197 lb 14.4 oz)   07/22/21 0841 89.2 kg (196 lb 9.6 oz)   07/15/21 0837 89.3 kg (196 lb 12.8 oz)   07/04/21 0836 87.5 kg (192 lb 14.4 oz)   06/30/21 2025 89.1 kg (196 lb 8 oz)   03/31/21 84.2 kg (185 lb 9.6 oz)   08/26/20 70.3 kg (155 lb)      MALNUTRITION  % Intake: </=75% for >/= 1 month (severe)  % Weight Loss: > 7.5% in 3 months  (severe)  Subcutaneous Fat Loss: None observed-visual only - per RD note from 12/20/21  Muscle Loss: Temporal:  mild, Facial & jaw region:  moderate and Posterior calf:  Moderate-visual only - per RD note from 12/20/21  Fluid Accumulation/Edema: None noted  Malnutrition Diagnosis: Severe malnutrition in the context of acute on chronic illness     Previous Goals   Patient to consume % of nutritionally adequate meal trays TID, or the equivalent with supplements/snacks.  Evaluation: Not met    Previous Nutrition Diagnosis  Inadequate oral intake related to multifactorial (altered mental status, menu fatigue) as evidenced by continued varying po intakes of <25% up to 100% with significant weight loss over the last month     Evaluation: No change    CURRENT NUTRITION DIAGNOSIS  Inadequate oral intake related to multifactorial (altered mental status, menu fatigue) as evidenced by continued varying po intakes of (0-50%) with significant weight loss over the last 3 months     INTERVENTIONS  Implementation  None additionally     Goals  Patient to consume % of nutritionally adequate meal trays TID, or the equivalent with supplements/snacks.    Monitoring/Evaluation  Progress toward goals will be monitored and evaluated per protocol.    Sharyn Carnes MS, RD, LDN  Unit Pager [phone]

## 2022-01-11 NOTE — PLAN OF CARE
Problem: Sleep Disturbance  Goal: Adequate Sleep/Rest  Outcome: No Change     Patient slept a total of 10.5 hours. Pulse-108. O2 sat-99% @ R.A. Intake-0. Output-0. No complaints noted the whole night.

## 2022-01-12 LAB
ANION GAP SERPL CALCULATED.3IONS-SCNC: 6 MMOL/L (ref 3–14)
BUN SERPL-MCNC: 14 MG/DL (ref 7–30)
CALCIUM SERPL-MCNC: 8.6 MG/DL (ref 8.5–10.1)
CHLORIDE BLD-SCNC: 112 MMOL/L (ref 94–109)
CO2 SERPL-SCNC: 21 MMOL/L (ref 20–32)
CREAT SERPL-MCNC: 0.53 MG/DL (ref 0.66–1.25)
GFR SERPL CREATININE-BSD FRML MDRD: >90 ML/MIN/1.73M2
GLUCOSE BLD-MCNC: 167 MG/DL (ref 70–99)
POTASSIUM BLD-SCNC: 3.8 MMOL/L (ref 3.4–5.3)
SODIUM SERPL-SCNC: 139 MMOL/L (ref 133–144)
TROPONIN I SERPL HS-MCNC: 4 NG/L

## 2022-01-12 PROCEDURE — 93005 ELECTROCARDIOGRAM TRACING: CPT

## 2022-01-12 PROCEDURE — 250N000013 HC RX MED GY IP 250 OP 250 PS 637: Performed by: PSYCHIATRY & NEUROLOGY

## 2022-01-12 PROCEDURE — 84484 ASSAY OF TROPONIN QUANT: CPT | Performed by: PHYSICIAN ASSISTANT

## 2022-01-12 PROCEDURE — 80048 BASIC METABOLIC PNL TOTAL CA: CPT | Performed by: PHYSICIAN ASSISTANT

## 2022-01-12 PROCEDURE — 36415 COLL VENOUS BLD VENIPUNCTURE: CPT | Performed by: PHYSICIAN ASSISTANT

## 2022-01-12 PROCEDURE — 99231 SBSQ HOSP IP/OBS SF/LOW 25: CPT | Performed by: PHYSICIAN ASSISTANT

## 2022-01-12 PROCEDURE — 99233 SBSQ HOSP IP/OBS HIGH 50: CPT | Performed by: PSYCHIATRY & NEUROLOGY

## 2022-01-12 PROCEDURE — 124N000003 HC R&B MH SENIOR/ADOLESCENT

## 2022-01-12 RX ADMIN — BUSPIRONE HYDROCHLORIDE 30 MG: 15 TABLET ORAL at 09:57

## 2022-01-12 RX ADMIN — DONEPEZIL HYDROCHLORIDE 10 MG: 10 TABLET ORAL at 20:00

## 2022-01-12 RX ADMIN — ASPIRIN 81 MG CHEWABLE TABLET 81 MG: 81 TABLET CHEWABLE at 09:57

## 2022-01-12 RX ADMIN — HALOPERIDOL 1 MG: 1 TABLET ORAL at 19:59

## 2022-01-12 RX ADMIN — MEGESTROL ACETATE 20 MG: 20 TABLET ORAL at 09:56

## 2022-01-12 RX ADMIN — MEMANTINE 10 MG: 10 TABLET ORAL at 20:00

## 2022-01-12 RX ADMIN — ATORVASTATIN CALCIUM 80 MG: 80 TABLET, FILM COATED ORAL at 20:00

## 2022-01-12 RX ADMIN — CLOZAPINE 200 MG: 100 TABLET ORAL at 20:00

## 2022-01-12 RX ADMIN — SALMETEROL XINAFOATE 1 PUFF: 50 POWDER, METERED ORAL; RESPIRATORY (INHALATION) at 20:00

## 2022-01-12 RX ADMIN — BUSPIRONE HYDROCHLORIDE 30 MG: 15 TABLET ORAL at 19:59

## 2022-01-12 RX ADMIN — GABAPENTIN 100 MG: 100 CAPSULE ORAL at 09:57

## 2022-01-12 RX ADMIN — MEMANTINE 10 MG: 10 TABLET ORAL at 09:56

## 2022-01-12 RX ADMIN — GABAPENTIN 100 MG: 100 CAPSULE ORAL at 17:31

## 2022-01-12 RX ADMIN — LEVOTHYROXINE SODIUM 88 MCG: 88 TABLET ORAL at 09:56

## 2022-01-12 RX ADMIN — MIRTAZAPINE 15 MG: 15 TABLET, FILM COATED ORAL at 20:00

## 2022-01-12 ASSESSMENT — ACTIVITIES OF DAILY LIVING (ADL)
HYGIENE/GROOMING: PROMPTS
LAUNDRY: UNABLE TO COMPLETE
DRESS: PROMPTS
ORAL_HYGIENE: PROMPTS
HYGIENE/GROOMING: PROMPTS
DRESS: PROMPTS;SCRUBS (BEHAVIORAL HEALTH)
ORAL_HYGIENE: PROMPTS

## 2022-01-12 NOTE — PLAN OF CARE
"Pt was isolative and withdrawn all shift.  Pt provided very brief answers to questions.  His speech is mumbling.  Pt approached several times to come to the lounge for dinner and snack - pt declined stating \"I am too tired to do anything.\"  Pt's dinner tray was brought to his room - he did not eat anything.  Pt drank one Boost, Gatorade and water totaling 580 mL.  Pt declined attempts to get him into the shower.  Pt declined assistance with hygiene.  Pt compliant with medications.    "

## 2022-01-12 NOTE — PLAN OF CARE
Problem: Sleep Disturbance  Goal: Adequate Sleep/Rest  Outcome: No Change     Patient slept a total of  10 hours without any behavioral concerns noted the whole shift.  O2 sat-96% @ R.A.  Intake- 0 Output-0.

## 2022-01-12 NOTE — PLAN OF CARE
"Flat affect, mood is calm. Pt agreeable to taking AM medication except inhaler. Pt oriented to person only, pt reoriented to reality. Pt appears responding to internal stimuli, pt looking into space and talking, pt verbalized he see other people in his room, pt oriented to reality.     Speech: minimal response to questions. Pt mumbles often and is difficult to understand.    Pt declined to get out of bed stating \"I am too tired\". Pt declined skin assessment.     I: 680 mL   0% breakfast, 25% lunch   O: Pt reports voiding in toilet, declines to measure, pt declined this writer to assess if he is wearing a brief.     1230: BP 89/63 P94 O2 97%, pt reporting chest pain, denies anxiety, IM notifed, EKG ordered, pt declined EKG verbalizing \"I am not having chest pain\", pt declined education. Per EKG tech order is good for 24 hours and to call if/when pt is agreeable. Pt agreeable to labs    Recheck: /80 P 105    Plan:  Continue to encourage fluids and increased activity  Continue to encourage group participation  Continue to encourage choices and independence  Continue to reorient to reality  "

## 2022-01-12 NOTE — PROGRESS NOTES
"[facility], [place]   Psychiatric Progress Note        Interim History:   The patient's care was discussed with the treatment team during the daily team meeting and/or staff's chart notes were reviewed.  Staff report patient has been about the same. Declined grooming. Slept well.     The patient reports that he is \"okay.\" Says that his sleep was \"awful\" and that he had a hard time staying asleep. Does report that he is eating better. Denies SI. Does agree to have his beard and hair trimmed. Says that he has \"many mansions\" in the [place] and in [place]. 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          Medications:       sodium chloride 0.9%  1,000 mL Intravenous Once     aspirin  81 mg Oral Daily     atorvastatin  80 mg Oral At Bedtime     busPIRone  30 mg Oral BID     [Held by provider] cholecalciferol  1,250 mcg Oral Q7 Days     cloZAPine  200 mg Oral At Bedtime     donepezil  10 mg Oral At Bedtime     gabapentin  100 mg Oral TID w/meals     haloperidol  1 mg Oral At Bedtime     levothyroxine  88 mcg Oral Daily     megestrol  20 mg Oral Daily     memantine  10 mg Oral BID     [Held by provider] metoprolol tartrate  25 mg Oral BID     mirtazapine  15 mg Oral At Bedtime     salmeterol  1 puff Inhalation BID     sodium chloride (PF)  3 mL Intracatheter Q8H          Allergies:     Allergies   Allergen Reactions     Ibuprofen      Latex           Labs:     No results found for this or any previous visit (from the past 24 hour(s)).       Psychiatric Examination:     BP 93/69 (BP Location: Right arm, Patient Position: Supine)   Pulse 107   Temp 98.1  F (36.7  C) (Temporal)   Resp 18   Ht 1.829 m (6')   Wt 74.1 kg (163 lb 4.8 oz)   SpO2 97%   BMI 22.15 kg/m    Weight is 163 lbs 4.8 oz  Body mass index is 22.15 kg/m .  Orthostatic Vitals  Report      Most Recent      Lying Orthostatic /87 01/02 0906    Sitting Orthostatic /87 01/02 0906    Sitting " Orthostatic Pulse (bpm) 84 01/02 0906            Appearance: awake, alert and poorly groomed  Attitude:  more cooperative  Eye Contact:  good  Mood:  good  Affect:  intensity is flat  Speech:  mumbling  Psychomotor Behavior:  no evidence of tardive dyskinesia, dystonia, or tics  Thought Process:  concrete  Associations:  no loose associations  Thought Content:  no evidence of suicidal ideation or homicidal ideation and obsessions present  Insight:  partial  Judgement:  limited  Oriented to:  person only  Attention Span and Concentration:  fair  Recent and Remote Memory:  poor    Clinical Global Impressions  First:  Considering your total clinical experience with this particular patient population, how severe are the patient's symptoms at this time?: 7 (07/01/21 0630)  Compared to the patient's condition at the START of treatment, this patient's condition is: 4 (07/01/21 0630)  Most recent:  Considering your total clinical experience with this particular patient population, how severe are the patient's symptoms at this time?: 4 (01/03/22 1344)  Compared to the patient's condition at the START of treatment, this patient's condition is: 2 (01/03/22 1344)         Precautions:     Behavioral Orders   Procedures     Assault precautions     Cheeking Precautions (behavioral units)     Patient Observed swallowing PO medications; Patient asked to drink water after swallowing medication; Patient in Staff line of sight for 15 minutes after medication given; Mouth checks after PO administration (patient asked to open mouth and stick out their tongue).     Code 1     Code 2     With Security for any imaging/testing needed.     Elopement precautions     Fall precautions     Routine Programming     As clinically indicated     Single Room     Status 15     Every 15 minutes.          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   2) Continue Remeron, Buspar and gabapentin.   3) Continue Aricept and Namenda.   4) Patient is committed with Yanez.   5) [facility] and [place] legal is working on guardianship and placement.   6) Medicine and nutrition following peripherally.   7) Okay for staff to cut hair or trim facial hair if patient agreeable.       Disposition Plan   Reason for ongoing admission: is unable to care for self due to severe psychosis or mariusz  Discharge location: group home  Discharge Medications: not ordered  Follow-up Appointments: not scheduled  Legal Status: full commitment    Entered by: Wilton Morfin on January 12, 2022 at 11:18 AM

## 2022-01-12 NOTE — PROGRESS NOTES
Brief Medicine Note    Contacted by nursing regarding soft blood pressure. BP 89/63 this afternoon. SBP has been running low 90s - 110s recently. HR in 90s. He endorses vague chest discomfort but is otherwise asymptomatic. Patient with poor PO intake throughout his admission.     Plan:   - Check EKG, troponin, BMP   - Reassuring that he is not having any dizziness, lightheadedness, etc with the low blood pressure   - Will hold on IVF bolus for now. Reconsider if he appears dehydrated on BMP, if blood pressure worsens, for new tachycardia, or if becomes more symptomatic    - Continue to encourage oral fluid intake     Francine Couch PA-C  Hospitalist Service  Pager: [phone]

## 2022-01-13 ENCOUNTER — HOSPITAL ENCOUNTER (OUTPATIENT)
Age: 57
End: 2022-01-13
Payer: COMMERCIAL

## 2022-01-13 LAB
ALBUMIN SERPL-MCNC: 2.9 G/DL (ref 3.4–5)
ALP SERPL-CCNC: 56 U/L (ref 40–150)
ALT SERPL W P-5'-P-CCNC: 59 U/L (ref 0–70)
ANION GAP SERPL CALCULATED.3IONS-SCNC: 7 MMOL/L (ref 3–14)
ANION GAP SERPL CALCULATED.3IONS-SCNC: 8 MMOL/L (ref 3–14)
AST SERPL W P-5'-P-CCNC: 69 U/L (ref 0–45)
BASOPHILS # BLD AUTO: 0.1 10E3/UL (ref 0–0.2)
BASOPHILS NFR BLD AUTO: 1 %
BILIRUB SERPL-MCNC: 0.4 MG/DL (ref 0.2–1.3)
BUN SERPL-MCNC: 14 MG/DL (ref 7–30)
BUN SERPL-MCNC: 14 MG/DL (ref 7–30)
CALCIUM SERPL-MCNC: 8.7 MG/DL (ref 8.5–10.1)
CALCIUM SERPL-MCNC: 8.9 MG/DL (ref 8.5–10.1)
CHLORIDE BLD-SCNC: 112 MMOL/L (ref 94–109)
CHLORIDE BLD-SCNC: 112 MMOL/L (ref 94–109)
CO2 SERPL-SCNC: 19 MMOL/L (ref 20–32)
CO2 SERPL-SCNC: 20 MMOL/L (ref 20–32)
CREAT SERPL-MCNC: 0.61 MG/DL (ref 0.66–1.25)
CREAT SERPL-MCNC: 0.62 MG/DL (ref 0.66–1.25)
CRP SERPL-MCNC: 18.9 MG/L (ref 0–8)
D DIMER PPP FEU-MCNC: 1.02 UG/ML FEU (ref 0–0.5)
EOSINOPHIL # BLD AUTO: 0.5 10E3/UL (ref 0–0.7)
EOSINOPHIL NFR BLD AUTO: 7 %
ERYTHROCYTE [DISTWIDTH] IN BLOOD BY AUTOMATED COUNT: 16 % (ref 10–15)
GFR SERPL CREATININE-BSD FRML MDRD: >90 ML/MIN/1.73M2
GFR SERPL CREATININE-BSD FRML MDRD: >90 ML/MIN/1.73M2
GLUCOSE BLD-MCNC: 103 MG/DL (ref 70–99)
GLUCOSE BLD-MCNC: 98 MG/DL (ref 70–99)
HCT VFR BLD AUTO: 39.2 % (ref 40–53)
HGB BLD-MCNC: 13.3 G/DL (ref 13.3–17.7)
IMM GRANULOCYTES # BLD: 0 10E3/UL
IMM GRANULOCYTES NFR BLD: 0 %
LYMPHOCYTES # BLD AUTO: 0.8 10E3/UL (ref 0.8–5.3)
LYMPHOCYTES NFR BLD AUTO: 13 %
MAGNESIUM SERPL-MCNC: 2 MG/DL (ref 1.6–2.3)
MCH RBC QN AUTO: 30.4 PG (ref 26.5–33)
MCHC RBC AUTO-ENTMCNC: 33.9 G/DL (ref 31.5–36.5)
MCV RBC AUTO: 90 FL (ref 78–100)
MONOCYTES # BLD AUTO: 0.5 10E3/UL (ref 0–1.3)
MONOCYTES NFR BLD AUTO: 7 %
NEUTROPHILS # BLD AUTO: 4.7 10E3/UL (ref 1.6–8.3)
NEUTROPHILS NFR BLD AUTO: 72 %
NRBC # BLD AUTO: 0 10E3/UL
NRBC BLD AUTO-RTO: 0 /100
PHOSPHATE SERPL-MCNC: 3.2 MG/DL (ref 2.5–4.5)
PLATELET # BLD AUTO: 156 10E3/UL (ref 150–450)
POTASSIUM BLD-SCNC: 3.5 MMOL/L (ref 3.4–5.3)
POTASSIUM BLD-SCNC: 3.7 MMOL/L (ref 3.4–5.3)
PROT SERPL-MCNC: 7 G/DL (ref 6.8–8.8)
RBC # BLD AUTO: 4.37 10E6/UL (ref 4.4–5.9)
SARS-COV-2 RNA RESP QL NAA+PROBE: POSITIVE
SODIUM SERPL-SCNC: 139 MMOL/L (ref 133–144)
SODIUM SERPL-SCNC: 139 MMOL/L (ref 133–144)
WBC # BLD AUTO: 6.5 10E3/UL (ref 4–11)

## 2022-01-13 PROCEDURE — 85004 AUTOMATED DIFF WBC COUNT: CPT | Performed by: PSYCHIATRY & NEUROLOGY

## 2022-01-13 PROCEDURE — 36415 COLL VENOUS BLD VENIPUNCTURE: CPT | Performed by: PSYCHIATRY & NEUROLOGY

## 2022-01-13 PROCEDURE — 99233 SBSQ HOSP IP/OBS HIGH 50: CPT | Performed by: PSYCHIATRY & NEUROLOGY

## 2022-01-13 PROCEDURE — 84100 ASSAY OF PHOSPHORUS: CPT | Performed by: PSYCHIATRY & NEUROLOGY

## 2022-01-13 PROCEDURE — 82310 ASSAY OF CALCIUM: CPT | Performed by: PSYCHIATRY & NEUROLOGY

## 2022-01-13 PROCEDURE — 87635 SARS-COV-2 COVID-19 AMP PRB: CPT | Performed by: PSYCHIATRY & NEUROLOGY

## 2022-01-13 PROCEDURE — 120N000002 HC R&B MED SURG/OB UMMC

## 2022-01-13 PROCEDURE — 36415 COLL VENOUS BLD VENIPUNCTURE: CPT | Performed by: INTERNAL MEDICINE

## 2022-01-13 PROCEDURE — 99222 1ST HOSP IP/OBS MODERATE 55: CPT | Mod: AI | Performed by: INTERNAL MEDICINE

## 2022-01-13 PROCEDURE — 250N000013 HC RX MED GY IP 250 OP 250 PS 637: Performed by: PSYCHIATRY & NEUROLOGY

## 2022-01-13 PROCEDURE — 99207 PR APP CREDIT; MD BILLING SHARED VISIT: CPT | Performed by: PHYSICIAN ASSISTANT

## 2022-01-13 PROCEDURE — 83735 ASSAY OF MAGNESIUM: CPT | Performed by: PSYCHIATRY & NEUROLOGY

## 2022-01-13 PROCEDURE — 99207 PR CDG-CHARGE REQUIRED MANUAL ENTRY: CPT | Performed by: INTERNAL MEDICINE

## 2022-01-13 PROCEDURE — 85379 FIBRIN DEGRADATION QUANT: CPT | Performed by: INTERNAL MEDICINE

## 2022-01-13 PROCEDURE — 86140 C-REACTIVE PROTEIN: CPT | Performed by: INTERNAL MEDICINE

## 2022-01-13 RX ORDER — MEGESTROL ACETATE 20 MG/1
20 TABLET ORAL DAILY
Status: CANCELLED | OUTPATIENT
Start: 2022-01-14

## 2022-01-13 RX ORDER — ALBUTEROL SULFATE 90 UG/1
1-2 AEROSOL, METERED RESPIRATORY (INHALATION) EVERY 4 HOURS PRN
Status: CANCELLED | OUTPATIENT
Start: 2022-01-13

## 2022-01-13 RX ORDER — ASPIRIN 81 MG/1
81 TABLET ORAL DAILY
Status: CANCELLED | OUTPATIENT
Start: 2022-01-14

## 2022-01-13 RX ORDER — HALOPERIDOL 1 MG/1
1 TABLET ORAL AT BEDTIME
Status: CANCELLED | OUTPATIENT
Start: 2022-01-13

## 2022-01-13 RX ORDER — MIRTAZAPINE 15 MG/1
15 TABLET, FILM COATED ORAL AT BEDTIME
Status: CANCELLED | OUTPATIENT
Start: 2022-01-13

## 2022-01-13 RX ORDER — BUSPIRONE HYDROCHLORIDE 30 MG/1
30 TABLET ORAL 2 TIMES DAILY
Status: CANCELLED | OUTPATIENT
Start: 2022-01-13

## 2022-01-13 RX ORDER — LIDOCAINE 40 MG/G
CREAM TOPICAL
Status: CANCELLED | OUTPATIENT
Start: 2022-01-13

## 2022-01-13 RX ORDER — CLOZAPINE 200 MG/1
200 TABLET ORAL AT BEDTIME
Status: ON HOLD
Start: 2022-01-13 | End: 2022-09-20

## 2022-01-13 RX ORDER — ACETAMINOPHEN 325 MG/1
650 TABLET ORAL EVERY 4 HOURS PRN
Status: CANCELLED | OUTPATIENT
Start: 2022-01-13

## 2022-01-13 RX ORDER — BUSPIRONE HYDROCHLORIDE 30 MG/1
30 TABLET ORAL 2 TIMES DAILY
Status: ON HOLD
Start: 2022-01-13 | End: 2022-09-20

## 2022-01-13 RX ORDER — DONEPEZIL HYDROCHLORIDE 10 MG/1
10 TABLET, FILM COATED ORAL AT BEDTIME
Status: ON HOLD
Start: 2022-01-13 | End: 2022-09-20

## 2022-01-13 RX ORDER — MEGESTROL ACETATE 20 MG/1
20 TABLET ORAL DAILY
Status: ON HOLD
Start: 2022-01-14 | End: 2022-09-20

## 2022-01-13 RX ORDER — DONEPEZIL HYDROCHLORIDE 10 MG/1
10 TABLET, FILM COATED ORAL AT BEDTIME
Status: CANCELLED | OUTPATIENT
Start: 2022-01-13

## 2022-01-13 RX ORDER — RISPERIDONE 1 MG/1
1 TABLET, ORALLY DISINTEGRATING ORAL EVERY 6 HOURS PRN
Status: CANCELLED | OUTPATIENT
Start: 2022-01-13

## 2022-01-13 RX ORDER — POLYETHYLENE GLYCOL 3350 17 G/17G
17 POWDER, FOR SOLUTION ORAL DAILY
Status: CANCELLED | OUTPATIENT
Start: 2022-01-14

## 2022-01-13 RX ORDER — CLOZAPINE 200 MG/1
200 TABLET ORAL AT BEDTIME
Status: CANCELLED | OUTPATIENT
Start: 2022-01-13

## 2022-01-13 RX ORDER — MEMANTINE HYDROCHLORIDE 10 MG/1
10 TABLET ORAL 2 TIMES DAILY
Status: ON HOLD
Start: 2022-01-13 | End: 2022-09-20

## 2022-01-13 RX ORDER — HALOPERIDOL 1 MG/1
1 TABLET ORAL AT BEDTIME
Status: ON HOLD
Start: 2022-01-13 | End: 2022-09-20

## 2022-01-13 RX ORDER — MIRTAZAPINE 15 MG/1
15 TABLET, FILM COATED ORAL AT BEDTIME
Status: ON HOLD
Start: 2022-01-13 | End: 2022-09-20

## 2022-01-13 RX ORDER — ATORVASTATIN CALCIUM 80 MG/1
80 TABLET, FILM COATED ORAL AT BEDTIME
Status: CANCELLED | OUTPATIENT
Start: 2022-01-13

## 2022-01-13 RX ORDER — ONDANSETRON 2 MG/ML
4 INJECTION INTRAMUSCULAR; INTRAVENOUS EVERY 6 HOURS PRN
Status: CANCELLED | OUTPATIENT
Start: 2022-01-13

## 2022-01-13 RX ORDER — GABAPENTIN 100 MG/1
100 CAPSULE ORAL
Status: ON HOLD
Start: 2022-01-13 | End: 2022-09-20

## 2022-01-13 RX ORDER — AMOXICILLIN 250 MG
2 CAPSULE ORAL
Status: CANCELLED | OUTPATIENT
Start: 2022-01-13

## 2022-01-13 RX ORDER — ONDANSETRON 4 MG/1
4 TABLET, ORALLY DISINTEGRATING ORAL EVERY 6 HOURS PRN
Status: CANCELLED | OUTPATIENT
Start: 2022-01-13

## 2022-01-13 RX ORDER — MEMANTINE HYDROCHLORIDE 10 MG/1
10 TABLET ORAL 2 TIMES DAILY
Status: CANCELLED | OUTPATIENT
Start: 2022-01-13

## 2022-01-13 RX ORDER — LANOLIN ALCOHOL/MO/W.PET/CERES
3 CREAM (GRAM) TOPICAL
Start: 2022-01-13

## 2022-01-13 RX ORDER — AMOXICILLIN 250 MG
2 CAPSULE ORAL
Status: ON HOLD
Start: 2022-01-13 | End: 2022-01-26

## 2022-01-13 RX ORDER — MAGNESIUM HYDROXIDE/ALUMINUM HYDROXICE/SIMETHICONE 120; 1200; 1200 MG/30ML; MG/30ML; MG/30ML
30 SUSPENSION ORAL EVERY 4 HOURS PRN
Status: CANCELLED | OUTPATIENT
Start: 2022-01-13

## 2022-01-13 RX ORDER — LEVOTHYROXINE SODIUM 88 UG/1
88 TABLET ORAL DAILY
Status: CANCELLED | OUTPATIENT
Start: 2022-01-14

## 2022-01-13 RX ADMIN — SALMETEROL XINAFOATE 1 PUFF: 50 POWDER, METERED ORAL; RESPIRATORY (INHALATION) at 09:00

## 2022-01-13 RX ADMIN — SALMETEROL XINAFOATE 1 PUFF: 50 POWDER, METERED ORAL; RESPIRATORY (INHALATION) at 19:39

## 2022-01-13 RX ADMIN — BUSPIRONE HYDROCHLORIDE 30 MG: 15 TABLET ORAL at 09:00

## 2022-01-13 RX ADMIN — MIRTAZAPINE 15 MG: 15 TABLET, FILM COATED ORAL at 19:40

## 2022-01-13 RX ADMIN — DONEPEZIL HYDROCHLORIDE 10 MG: 10 TABLET ORAL at 19:12

## 2022-01-13 RX ADMIN — LEVOTHYROXINE SODIUM 88 MCG: 88 TABLET ORAL at 09:00

## 2022-01-13 RX ADMIN — HALOPERIDOL 1 MG: 1 TABLET ORAL at 19:12

## 2022-01-13 RX ADMIN — MEMANTINE 10 MG: 10 TABLET ORAL at 19:12

## 2022-01-13 RX ADMIN — GABAPENTIN 100 MG: 100 CAPSULE ORAL at 14:57

## 2022-01-13 RX ADMIN — ATORVASTATIN CALCIUM 80 MG: 80 TABLET, FILM COATED ORAL at 19:12

## 2022-01-13 RX ADMIN — MEGESTROL ACETATE 20 MG: 20 TABLET ORAL at 09:00

## 2022-01-13 RX ADMIN — MEMANTINE 10 MG: 10 TABLET ORAL at 09:00

## 2022-01-13 RX ADMIN — GABAPENTIN 100 MG: 100 CAPSULE ORAL at 09:00

## 2022-01-13 RX ADMIN — BUSPIRONE HYDROCHLORIDE 30 MG: 15 TABLET ORAL at 19:12

## 2022-01-13 RX ADMIN — GABAPENTIN 100 MG: 100 CAPSULE ORAL at 19:12

## 2022-01-13 RX ADMIN — ASPIRIN 81 MG CHEWABLE TABLET 81 MG: 81 TABLET CHEWABLE at 09:00

## 2022-01-13 RX ADMIN — CLOZAPINE 200 MG: 100 TABLET ORAL at 19:12

## 2022-01-13 ASSESSMENT — ACTIVITIES OF DAILY LIVING (ADL)
HYGIENE/GROOMING: PROMPTS
ORAL_HYGIENE: INDEPENDENT;PROMPTS
HYGIENE/GROOMING: INDEPENDENT;PROMPTS
LAUNDRY: UNABLE TO COMPLETE
DRESS: PROMPTS
ORAL_HYGIENE: PROMPTS
DRESS: INDEPENDENT

## 2022-01-13 NOTE — DISCHARGE SUMMARY
"Psychiatric Discharge Summary    John Juárez MRN# [id number]   Age: 56 year old YOB: 1965     Date of Admission:  6/30/2021  Date of Discharge:  1/13/2021  Admitting Physician:  Wilton Morfin MD   Discharge Physician:  Wilton Morfin MD          Event Leading to Hospitalization:   The patient is a 56yo male with a history of schizophrenia and TBI who was admitted after being transferred from [facility] after a nearly year-long stay. Is currently under commitment with Yanez being amended to include Clozaril. Patient reports that he is \"a little bit depressed.\" Says that he didn't sleep well. Has been confused per staff. Denies SI or HI. Denies AH but does endorse possible visual hallucinations. Oriented to self only. Attempted to orient him to change in hospital but patient not able to track this.         See Admission note for additional details.          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   Tardive Dyskinesia   Alcohol Use Disorder, in controlled environment   Stimulant Use Disorder, in controlled environment          Labs:        Lab Results   Component Value Date     01/13/2022     07/01/2021    Lab Results   Component Value Date    CHLORIDE 112 01/13/2022    CHLORIDE 107 07/01/2021    Lab Results   Component Value Date    BUN 14 01/13/2022    BUN 14 07/01/2021      Lab Results   Component Value Date    POTASSIUM 3.5 01/13/2022    POTASSIUM 4.3 07/01/2021    Lab Results   Component Value Date    CO2 20 01/13/2022    CO2 28 07/01/2021    Lab Results   Component Value Date    CR 0.61 01/13/2022    CR 0.90 07/01/2021          Lab Results   Component Value Date    WBC 6.5 01/13/2022    HGB 13.3 01/13/2022    HCT 39.2 (L) 01/13/2022    MCV 90 01/13/2022     01/13/2022     Lab Results   Component Value Date    AST 74 (H) 11/07/2021    ALT 32 11/07/2021    ALKPHOS 66 11/07/2021    " April 25, 2019     Patient: Fior Mendoza   YOB: 1937   Date of Visit: 4/25/2019       To Whom it May Concern:    Please apply Ms. Mendoza's OTC lower extremity compressions stockings daily (on in the morning, off at night).    Sincerely,         Michael Castro,     Medical information is confidential and cannot be disclosed without the written consent of the patient or her representative.       BILITOTAL 0.5 11/07/2021    SAWYER 31 11/25/2021     Lab Results   Component Value Date    TSH 2.71 11/04/2021            Consults:   Consultation during this admission received from internal medicine and neurology         Hospital Course:   John Juárez was admitted to [facility] and transferred to [facility] with attending Wilton Morfin MD  under an ongoing civil commitment. The patient was placed under status 15 (15 minute checks) to ensure patient safety.   CBC, BMP and utox obtained.    All outpatient medications were continued. The patient was stabilized on Clozaril and small dose of Haldol.     John Juárez did not participate in groups and was visible in the milieu.     The patient's symptoms of psychosis improved. The patient was Covid positive and was transferred to medicine.     John Juárez was transfered to medicine floor.           Discharge Medications:     Current Discharge Medication List      START taking these medications    Details   benzocaine-menthol (CEPACOL) 15-3.6 MG lozenge Place 1 lozenge inside cheek every hour as needed for moderate pain    Associated Diagnoses: Smoker      busPIRone (BUSPAR) 30 MG tablet Take 1 tablet (30 mg) by mouth 2 times daily    Associated Diagnoses: Anxiety      cloZAPine (CLOZARIL) 200 MG tablet Take 1 tablet (200 mg) by mouth At Bedtime    Associated Diagnoses: Chronic schizophrenia (H)      donepezil (ARICEPT) 10 MG tablet Take 1 tablet (10 mg) by mouth At Bedtime    Associated Diagnoses: Dementia with behavioral disturbance, unspecified dementia type (H)      gabapentin (NEURONTIN) 100 MG capsule Take 1 capsule (100 mg) by mouth 3 times daily (with meals)    Associated Diagnoses: Chronic schizophrenia (H)      megestrol (MEGACE) 20 MG tablet Take 1 tablet (20 mg) by mouth daily    Associated Diagnoses: Chronic schizophrenia (H)      melatonin 3 MG tablet Take 1 tablet (3 mg) by mouth nightly as needed for sleep    Associated Diagnoses:  Chronic schizophrenia (H)      mirtazapine (REMERON) 15 MG tablet Take 1 tablet (15 mg) by mouth At Bedtime    Associated Diagnoses: Chronic schizophrenia (H)      polyethylene glycol-propylene glycol PF (SYSTANE ULTRA PF) 0.4-0.3 % SOLN opthalmic solution Place 1 drop into both eyes every hour as needed for dry eyes    Associated Diagnoses: Chronic schizophrenia (H)      senna-docusate (SENOKOT-S/PERICOLACE) 8.6-50 MG tablet Take 2 tablets by mouth nightly as needed for constipation    Associated Diagnoses: Chronic schizophrenia (H)         CONTINUE these medications which have CHANGED    Details   haloperidol (HALDOL) 1 MG tablet Take 1 tablet (1 mg) by mouth At Bedtime    Associated Diagnoses: Chronic schizophrenia (H)      memantine (NAMENDA) 10 MG tablet Take 1 tablet (10 mg) by mouth 2 times daily    Associated Diagnoses: Dementia associated with other underlying disease with behavioral disturbance (H)         CONTINUE these medications which have NOT CHANGED    Details   acetaminophen (TYLENOL) 325 MG tablet Take 2 tablets (650 mg) by mouth every 4 hours as needed for mild pain  Qty: 1 Bottle, Refills: 0    Associated Diagnoses: Chronic bronchitis, unspecified chronic bronchitis type (H)      alum & mag hydroxide-simethicone (MAALOX) 200-200-20 MG/5ML SUSP suspension Take 30 mLs by mouth every 4 hours as needed for indigestion  Qty: 1 Bottle, Refills: 0    Associated Diagnoses: Chronic bronchitis, unspecified chronic bronchitis type (H)      aspirin 81 MG EC tablet Take 1 tablet (81 mg) by mouth daily  Qty: 30 tablet, Refills: 0    Associated Diagnoses: Smoker      atorvastatin (LIPITOR) 80 MG tablet Take 1 tablet (80 mg) by mouth At Bedtime  Qty: 30 tablet, Refills: 0    Associated Diagnoses: Hyperlipidemia, unspecified hyperlipidemia type      levothyroxine (SYNTHROID/LEVOTHROID) 88 MCG tablet Take 1 tablet (88 mcg) by mouth daily  Qty: 30 tablet, Refills: 0    Associated Diagnoses: Acquired hypothyroidism       nicotine (NICORETTE) 2 MG gum Place 1 each (2 mg) inside cheek every hour as needed for smoking cessation  Qty: 30 each, Refills: 0    Associated Diagnoses: Smoker      risperiDONE (RISPERDAL M-TABS) 1 MG ODT Place 1 tablet (1 mg) under the tongue every 6 hours as needed (agitation)  Qty:      Associated Diagnoses: Psychosis, unspecified psychosis type (H)      salmeterol (SEREVENT) 50 MCG/DOSE inhaler Inhale 1 puff into the lungs 2 times daily  Qty: 1 Inhaler, Refills: 0    Associated Diagnoses: Chronic bronchitis, unspecified chronic bronchitis type (H)      albuterol (PROAIR HFA/PROVENTIL HFA/VENTOLIN HFA) 108 (90 Base) MCG/ACT inhaler Inhale 1-2 puffs into the lungs every 4 hours as needed for shortness of breath / dyspnea or wheezing  Qty: 1 Inhaler, Refills: 0    Comments: Pharmacy may dispense brand covered by insurance (Proair, or proventil or ventolin or generic albuterol inhaler)  Associated Diagnoses: Chronic bronchitis, unspecified chronic bronchitis type (H)      polyethylene glycol (MIRALAX) 17 g packet Take 17 g by mouth daily Hold for loose stools  Qty: 30 packet, Refills: 0    Associated Diagnoses: Hyperlipidemia, unspecified hyperlipidemia type         STOP taking these medications       benztropine (COGENTIN) 1 MG tablet Comments:   Reason for Stopping:         cholecalciferol (VITAMIN D3) 1250 mcg (34706 units) capsule Comments:   Reason for Stopping:         divalproex sodium extended-release (DEPAKOTE ER) 500 MG 24 hr tablet Comments:   Reason for Stopping:         docusate sodium (COLACE) 100 MG capsule Comments:   Reason for Stopping:         LORazepam (ATIVAN) 1 MG tablet Comments:   Reason for Stopping:         nicotine (NICODERM CQ) 21 MG/24HR 24 hr patch Comments:   Reason for Stopping:         vitamin D3 (CHOLECALCIFEROL) 50 mcg (2000 units) tablet Comments:   Reason for Stopping:                    Psychiatric Examination:   See MSE from progress note on this date.           Discharge Plan:   Continue medications as above.     Transfer to medicine floor.     Attestation:    The patient was seen and evaluated by me. I spent more than 30 minutes on discharge day activities. Wilton Morfin MD

## 2022-01-13 NOTE — PLAN OF CARE
"  Pt out to lounge for dinner but otherwise isolative to bed and withdrawn from peers. Ate only approx 10% of dinner, appetite poor. Affect flat. Appears internally preoccupied at times. Irritable when asked to allow collection of COVID swab, glaring and stating \"What the fuck do you need that again for? No, I'm not doing that!\" Compliant with medications when offered. Writer was later informed that infection control says pt must remain in room for 10 days if he refuses COVID swab, but pt sleeping already. Plan: inform patient if awake again this shift. Continue with current treatment plan and recommendations. Continue to monitor and reassess symptoms. Monitor response to medications. Monitor progress towards treatment goals. Encourage groups and participation.   "

## 2022-01-13 NOTE — PROGRESS NOTES
"[facility], [place]   Psychiatric Progress Note        Interim History:   The patient's care was discussed with the treatment team during the daily team meeting and/or staff's chart notes were reviewed.  Staff report patient refused Covid test. Facial hair interfering with eating.     The patient reports that he is \"all right.\" Says that he didn't sleep that well. Is eating and drinking. Denies SI. Doesn't want Covid nasal swab but is agreeable to saliva test.          Medications:       aspirin  81 mg Oral Daily     atorvastatin  80 mg Oral At Bedtime     busPIRone  30 mg Oral BID     [Held by provider] cholecalciferol  1,250 mcg Oral Q7 Days     cloZAPine  200 mg Oral At Bedtime     donepezil  10 mg Oral At Bedtime     gabapentin  100 mg Oral TID w/meals     haloperidol  1 mg Oral At Bedtime     levothyroxine  88 mcg Oral Daily     megestrol  20 mg Oral Daily     memantine  10 mg Oral BID     [Held by provider] metoprolol tartrate  25 mg Oral BID     mirtazapine  15 mg Oral At Bedtime     salmeterol  1 puff Inhalation BID     sodium chloride (PF)  3 mL Intracatheter Q8H          Allergies:     Allergies   Allergen Reactions     Ibuprofen      Latex           Labs:     Recent Results (from the past 24 hour(s))   Troponin I    Collection Time: 01/12/22  1:29 PM   Result Value Ref Range    Troponin I High Sensitivity 4 <79 ng/L   Basic metabolic panel    Collection Time: 01/12/22  1:29 PM   Result Value Ref Range    Sodium 139 133 - 144 mmol/L    Potassium 3.8 3.4 - 5.3 mmol/L    Chloride 112 (H) 94 - 109 mmol/L    Carbon Dioxide (CO2) 21 20 - 32 mmol/L    Anion Gap 6 3 - 14 mmol/L    Urea Nitrogen 14 7 - 30 mg/dL    Creatinine 0.53 (L) 0.66 - 1.25 mg/dL    Calcium 8.6 8.5 - 10.1 mg/dL    Glucose 167 (H) 70 - 99 mg/dL    GFR Estimate >90 >60 mL/min/1.73m2   Basic metabolic panel    Collection Time: 01/13/22  8:09 AM   Result Value Ref Range    Sodium 139 133 - 144 mmol/L    Potassium 3.5 " "3.4 - 5.3 mmol/L    Chloride 112 (H) 94 - 109 mmol/L    Carbon Dioxide (CO2) 20 20 - 32 mmol/L    Anion Gap 7 3 - 14 mmol/L    Urea Nitrogen 14 7 - 30 mg/dL    Creatinine 0.61 (L) 0.66 - 1.25 mg/dL    Calcium 8.9 8.5 - 10.1 mg/dL    Glucose 103 (H) 70 - 99 mg/dL    GFR Estimate >90 >60 mL/min/1.73m2   Magnesium    Collection Time: 01/13/22  8:09 AM   Result Value Ref Range    Magnesium 2.0 1.6 - 2.3 mg/dL   Phosphorus    Collection Time: 01/13/22  8:09 AM   Result Value Ref Range    Phosphorus 3.2 2.5 - 4.5 mg/dL   CBC with platelets and differential    Collection Time: 01/13/22  8:09 AM   Result Value Ref Range    WBC Count 6.5 4.0 - 11.0 10e3/uL    RBC Count 4.37 (L) 4.40 - 5.90 10e6/uL    Hemoglobin 13.3 13.3 - 17.7 g/dL    Hematocrit 39.2 (L) 40.0 - 53.0 %    MCV 90 78 - 100 fL    MCH 30.4 26.5 - 33.0 pg    MCHC 33.9 31.5 - 36.5 g/dL    RDW 16.0 (H) 10.0 - 15.0 %    Platelet Count 156 150 - 450 10e3/uL    % Neutrophils 72 %    % Lymphocytes 13 %    % Monocytes 7 %    % Eosinophils 7 %    % Basophils 1 %    % Immature Granulocytes 0 %    NRBCs per 100 WBC 0 <1 /100    Absolute Neutrophils 4.7 1.6 - 8.3 10e3/uL    Absolute Lymphocytes 0.8 0.8 - 5.3 10e3/uL    Absolute Monocytes 0.5 0.0 - 1.3 10e3/uL    Absolute Eosinophils 0.5 0.0 - 0.7 10e3/uL    Absolute Basophils 0.1 0.0 - 0.2 10e3/uL    Absolute Immature Granulocytes 0.0 <=0.4 10e3/uL    Absolute NRBCs 0.0 10e3/uL          Psychiatric Examination:     /61   Pulse 109   Temp 97.2  F (36.2  C) (Temporal)   Resp 16   Ht 1.829 m (6')   Wt 74.1 kg (163 lb 4.8 oz)   SpO2 96%   BMI 22.15 kg/m    Weight is 163 lbs 4.8 oz  Body mass index is 22.15 kg/m .  Orthostatic Vitals  Report      Most Recent      Lying Orthostatic /87 01/02 0906    Sitting Orthostatic /87 01/02 0906    Sitting Orthostatic Pulse (bpm) 84 01/02 0906            Appearance: awake, alert and poorly groomed  Attitude:  more cooperative  Eye Contact:  good  Mood:  \"all " "right\"  Affect:  intensity is flat  Speech:  mumbling  Psychomotor Behavior:  no evidence of tardive dyskinesia, dystonia, or tics  Thought Process:  concrete  Associations:  no loose associations  Thought Content:  no evidence of suicidal ideation or homicidal ideation and obsessions present  Insight:  partial  Judgement:  limited  Oriented to:  person only  Attention Span and Concentration:  fair  Recent and Remote Memory:  poor    Clinical Global Impressions  First:  Considering your total clinical experience with this particular patient population, how severe are the patient's symptoms at this time?: 7 (07/01/21 0630)  Compared to the patient's condition at the START of treatment, this patient's condition is: 4 (07/01/21 0630)  Most recent:  Considering your total clinical experience with this particular patient population, how severe are the patient's symptoms at this time?: 4 (01/03/22 1344)  Compared to the patient's condition at the START of treatment, this patient's condition is: 2 (01/03/22 1344)         Precautions:     Behavioral Orders   Procedures     Assault precautions     Cheeking Precautions (behavioral units)     Patient Observed swallowing PO medications; Patient asked to drink water after swallowing medication; Patient in Staff line of sight for 15 minutes after medication given; Mouth checks after PO administration (patient asked to open mouth and stick out their tongue).     Code 1     Code 2     With Security for any imaging/testing needed.     Elopement precautions     Fall precautions     Routine Programming     As clinically indicated     Single Room     Status 15     Every 15 minutes.          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   2) Continue Remeron, " Buspar and gabapentin.   3) Continue Aricept and Namenda.   4) Patient is committed with Yanez.   5) [facility] and [facility] is working on guardianship and placement.   6) Medicine and nutrition following peripherally.   7) Okay for staff to cut hair or trim facial hair if patient agreeable.   8) Patient agreeable to saliva Covid test.       Disposition Plan   Reason for ongoing admission: is unable to care for self due to severe psychosis or mariusz  Discharge location: group home  Discharge Medications: not ordered  Follow-up Appointments: not scheduled  Legal Status: full commitment    Entered by: Wilton Morfin on January 13, 2022 at 10:40 AM

## 2022-01-13 NOTE — PLAN OF CARE
Problem: Cognitive Impairment (Psychotic Signs/Symptoms)  Goal: Optimal Cognitive Function (Psychotic Signs/Symptoms)  Outcome: ImprovingNursing Assessment    Behavior disturbance [F91.9]    Admit Date: 6/30/2021    Length of Stay: 197    Patient evaluation continues. Assessed mood,anxiety,thoughts and behavior. Patient is  progressing towards goals. Patient is encouraged to participate in groups and assisted to develop healthy coping skills.  Patient denies auditory or visual hallucinations. /61   Pulse 109   Temp 97.2  F (36.2  C) (Temporal)   Resp 16   Ht 1.829 m (6')   Wt 74.1 kg (163 lb 4.8 oz)   SpO2 96%   BMI 22.15 kg/m      Mood: calm    Patient reports depression none and reports anxiety none    Affect:flat blunted    Sleep: 9 hours last night    Appetite: fair    SI: denies    HI: denies    SIB: denies      Medication Compliance yes    Group participation:none    ADL's: refuses    Fall risk interventions: proper foot wear    Rinku Score Interventions:none    Discharge planning in process    Refer to daily team meeting notes for individualized plan of care. Nursing will continue to assess.    *Scale is 1-10 and 10 is the worst.         Flowsheets (Taken 12/18/2021 1626 by Zeke Hudson, RN)  Mutually Determined Action Steps (Optimal Cognitive Function): follows step-by-step instructions  Intervention: Support and Promote Cognitive Ability  Recent Flowsheet Documentation  Taken 1/13/2022 1400 by Wen Broderick, RN  Trust Relationship/Rapport:   care explained   choices provided   emotional support provided   empathic listening provided   reassurance provided   thoughts/feelings acknowledged

## 2022-01-13 NOTE — PLAN OF CARE
Pt tested COVID positive.   Discharge order in place.   [phone] is working on establishing a bed on 5 ortho or medical unit, which ever is available.   Waiting call back

## 2022-01-13 NOTE — PLAN OF CARE
"  Problem: Sleep Disturbance  Goal: Adequate Sleep/Rest  Outcome: No Change     Patient slept a total of 9 hours. O2 sat- 94% @ R.A.  P-105/min. Total Intake- 240 ml water. Total Output - 0.    Patient still refusing the COVID swab. When asked why he is refusing, he said, \"I don't know.\"  "

## 2022-01-14 PROCEDURE — 250N000013 HC RX MED GY IP 250 OP 250 PS 637: Performed by: PSYCHIATRY & NEUROLOGY

## 2022-01-14 PROCEDURE — 99233 SBSQ HOSP IP/OBS HIGH 50: CPT | Performed by: INTERNAL MEDICINE

## 2022-01-14 PROCEDURE — 99207 PR CONSULT E&M CHANGED TO SUBSEQUENT LEVEL: CPT | Performed by: PSYCHIATRY & NEUROLOGY

## 2022-01-14 PROCEDURE — 99233 SBSQ HOSP IP/OBS HIGH 50: CPT | Performed by: PSYCHIATRY & NEUROLOGY

## 2022-01-14 PROCEDURE — 120N000002 HC R&B MED SURG/OB UMMC

## 2022-01-14 RX ADMIN — GABAPENTIN 100 MG: 100 CAPSULE ORAL at 12:09

## 2022-01-14 RX ADMIN — SALMETEROL XINAFOATE 1 PUFF: 50 POWDER, METERED ORAL; RESPIRATORY (INHALATION) at 20:28

## 2022-01-14 RX ADMIN — BUSPIRONE HYDROCHLORIDE 30 MG: 15 TABLET ORAL at 09:08

## 2022-01-14 RX ADMIN — GABAPENTIN 100 MG: 100 CAPSULE ORAL at 09:08

## 2022-01-14 RX ADMIN — HALOPERIDOL 1 MG: 1 TABLET ORAL at 20:28

## 2022-01-14 RX ADMIN — MEMANTINE 10 MG: 10 TABLET ORAL at 09:08

## 2022-01-14 RX ADMIN — DONEPEZIL HYDROCHLORIDE 10 MG: 10 TABLET ORAL at 20:28

## 2022-01-14 RX ADMIN — MIRTAZAPINE 15 MG: 15 TABLET, FILM COATED ORAL at 20:27

## 2022-01-14 RX ADMIN — LEVOTHYROXINE SODIUM 88 MCG: 88 TABLET ORAL at 09:08

## 2022-01-14 RX ADMIN — ATORVASTATIN CALCIUM 80 MG: 80 TABLET, FILM COATED ORAL at 20:28

## 2022-01-14 RX ADMIN — CLOZAPINE 200 MG: 100 TABLET ORAL at 20:28

## 2022-01-14 RX ADMIN — MEMANTINE 10 MG: 10 TABLET ORAL at 20:28

## 2022-01-14 RX ADMIN — BUSPIRONE HYDROCHLORIDE 30 MG: 15 TABLET ORAL at 20:28

## 2022-01-14 RX ADMIN — MEGESTROL ACETATE 20 MG: 20 TABLET ORAL at 09:08

## 2022-01-14 RX ADMIN — SALMETEROL XINAFOATE 1 PUFF: 50 POWDER, METERED ORAL; RESPIRATORY (INHALATION) at 09:08

## 2022-01-14 RX ADMIN — ASPIRIN 81 MG CHEWABLE TABLET 81 MG: 81 TABLET CHEWABLE at 09:07

## 2022-01-14 RX ADMIN — GABAPENTIN 100 MG: 100 CAPSULE ORAL at 17:02

## 2022-01-14 NOTE — PROGRESS NOTES
[facility]    Medicine Progress Note - Hospitalist Service       Date of Admission:  6/30/2021    Assessment & Plan      John Juárez is a 55 yo gentleman admitted on 1/13/22.  He has a PMH of COPD, cardiomyopathy, endocarditis s/p bioprosthetic valves (2015), CVA, HLD, hypothyroidism, TBI and schizophrenia.  He was hospitalized at [facility] for ~ a yr and then transferred to station [address] [place] [address] unit where he was hospitalized 6/30 - 1/13/22.  He is currently under commitment with Yanez being amended to include Clozaril. Confused . Denies AH but does endorse possible visual hallucinations. Oriented to self only. Attempted to orient him to change in hospital but patient not able to track this.  Routine COVID-19 survelliance at station  on 1/13/22 was positive prompting his transfer to [facility].     COVID-19 infection    ---   Vaccinated with J&J vaccine on 8/24/2021  ---   Completely asymptomatic  ---   Tested positive on 1/13/22 during routine surveillance test  ---   On RA with O2 sat of 90-96%  ---   He does not qualify for Decadron or other COVID therapy  ---   Continue full barrier isolation  ---   Enoxaparin 40 mg subcutaneous daily for DVT prophylaxis     COPD:     ---   Lungs are clear on exam.    ---   On RA.  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 Dockery of [facility], last seen in 1/2020.   ---   Echocardiogram in 10/2021 with EF 55-60%, no evidence for bioprosthetic mitral and aortic valve dysfunction.   ---   Follow-up with outpatient Cardiology upon dismissal from the hospital (on every 2 year follow-ups)  ---   Continue ASA 81 mg daily     HFrEF, now recovered  ---   Compensated  ---   Not on any diuretics chronically.     Hypothyroidism  ---   TSH 2.71 on 11/4/2021.    ---   Continue PTA levothyroxine 88 mcg daily     ---   Hx of CVA   ---  Hx of basal ganglia stroke in 2015. No focal neuro deficits aside from cognitive dysfunction as above.   ---   Continue PTA ASA 81 mg and atorvastatin 80mg daily     Hyperlipidemia   ---   Continue PTA atorvastatin 80 mg daily.     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  ---   Confused and delusional  ---   Psychiatry consulted  ---   Management per psychiatry        Diet:   Regular Diet  DVT Prophylaxis: Enoxaparin (Lovenox) SQ and Ambulate every shift  Valentin Catheter: Not present  Central Lines: None  Code Status:   Full Code  Disposition Plan:   TBD.        Justin Martinez MD.   Hospitalist.  [phone], pager.    __________________________________________________________      Interval History   No complaints.  Confused  Has a bedside sitter    Data reviewed today: I reviewed all medications, new labs and imaging results over the last 24 hours.     Physical Exam   Vital Signs: Temp: 99.3  F (37.4  C) Temp src: Oral BP: 113/81 Pulse: 98   Resp: 17 SpO2: 98 % O2 Device: None (Room air)    Weight: 163 lbs 4.8 oz  General: confused and disoriented, NAD.  HEENT:  NC/AT, PERRL, EOMI, neck supple, no thyromegaly, op clear, mmm.  CVS:  NL s 1 and s2, no m/r/g.  Lungs:  CTA B/L.   Abd:  Soft, + bs, NT, no rebound or gaurding, no fluid shift.  Ext:  No c/c.  Lymph:  No edema.  Neuro:  Nonfocal.  Musculoskeletal: No calf tenderness to palpation.    Skin:  No rash.  Psychiatry:  Mood and affect appropriate.        Data   Recent Labs   Lab 01/13/22  0809 01/12/22  1329   WBC 6.5  --    HGB 13.3  --    MCV 90  --      --      139 139   POTASSIUM 3.7  3.5 3.8   CHLORIDE 112*  112* 112*    CO2 19*  20 21   BUN 14  14 14   CR 0.62*  0.61* 0.53*   ANIONGAP 8  7 6   LESLEE 8.7  8.9 8.6   GLC 98  103* 167*   ALBUMIN 2.9*  --    PROTTOTAL 7.0  --    BILITOTAL 0.4  --    ALKPHOS 56  --    ALT 59  --    AST 69*  --      No results found for this or any previous visit (from the past 24 hour(s)).  Medications       aspirin  81 mg Oral Daily     atorvastatin  80 mg Oral At Bedtime     busPIRone  30 mg Oral BID     [Held by provider] cholecalciferol  1,250 mcg Oral Q7 Days     cloZAPine  200 mg Oral At Bedtime     donepezil  10 mg Oral At Bedtime     gabapentin  100 mg Oral TID w/meals     haloperidol  1 mg Oral At Bedtime     levothyroxine  88 mcg Oral Daily     megestrol  20 mg Oral Daily     memantine  10 mg Oral BID     [Held by provider] metoprolol tartrate  25 mg Oral BID     mirtazapine  15 mg Oral At Bedtime     salmeterol  1 puff Inhalation BID

## 2022-01-14 NOTE — PLAN OF CARE
Pt informed he will be transferring to a medical unit when bed available. Pt needed to be reminded he tested positive for COVID. He asked if he was contagious. Pt denies any symptoms.   B/P: 111/75, T: 97.8, P: 85, R: 14  Medication compliant with bedtime meds.   Reoriented to place. Pt states he prefers to go home as opposed to medical unit. Reminded he needs medical care.   Compliant and cooperative.

## 2022-01-14 NOTE — PLAN OF CARE
Assumed care of pt at 23:30.  Pt appears to be resting comfortably side-lying in bed, asleep, breathing easily.  No distress noted.  Sitter 1:1 at bedside.
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
## 2022-01-14 NOTE — PROGRESS NOTES
[facility]  Transfer Triage Note    Date of call: 01/13/22  Time of call: 6:51 PM    Current Patient Location: [facility]  Current Level of Care: Mental Health    Vitals:   Diagnosis: Schizophrenia, new COVID19 infection  Is COVID-19 a concern? Yes  Reason for requested transfer: Further diagnostic work up, management, and consultation for specialized care   Isolation Needs: Special Precautions COVID-19    Outside Records: Available  Additional records may be faxed to [phone].    Transfer accepted: Yes  Stability of Patient: Patient is vitally stable, with no critical labs, and will likely remain stable throughout the transfer process  Level of Care Needed: Med Surg  Telemetry Needed:  None  Expected Time of Arrival for Transfer: 0-8 hours  [facility]    Recommendations for Management and Stabilization: Not needed    Additional Comments: Patient will transfer to med-surg bed on [facility] with Psychiatry consult.    Adolph Calvo MD  [facility] Triage  Internal Medicine Hospitalist  Pager: [phone]

## 2022-01-14 NOTE — PROGRESS NOTES
When the writer arrived the pt was sleeping. Pt woke-up approximally 0110am, pt walk unsteady to bathroom, vital signs was taken and chart in. Pt went back to sleep until 0619. Overall, pt mood was calm, cooperative and self-talk (responding HI/VH). Pt's safety intact, no abnormal occur at this time. Will continue to monitor the pt.
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
## 2022-01-14 NOTE — PLAN OF CARE
Pt transferred to  in  with . Pt's sitter accompanied pt. Staff will replace this sitter at shift change.

## 2022-01-14 NOTE — PLAN OF CARE
[id number]  VS: Alert - disoriented x3, oriented to self. VSS. Denied chest pain. Denied SOB.    O2: >95% on RA   Output: Up to bathroom voiding spontaneously w/o difficulty. LBM 1/12/22 per pt report.   Activity: Independent - steady gait   Skin/Dressing: Intact    Pain: Denied pain   CMS: Intact - denied numbness or tingling   Diet: Mechanical dental soft   LDA: N/A   Equipment: Personal belongings   Additional Info/Plan: Pt able to make his needs known. 1:1 at bedside. Slept for half of shift. Cooperative.   Continue POC.

## 2022-01-15 PROCEDURE — 120N000002 HC R&B MED SURG/OB UMMC

## 2022-01-15 PROCEDURE — 250N000013 HC RX MED GY IP 250 OP 250 PS 637: Performed by: PSYCHIATRY & NEUROLOGY

## 2022-01-15 PROCEDURE — 99232 SBSQ HOSP IP/OBS MODERATE 35: CPT | Performed by: INTERNAL MEDICINE

## 2022-01-15 RX ADMIN — MEMANTINE 10 MG: 10 TABLET ORAL at 08:50

## 2022-01-15 RX ADMIN — MIRTAZAPINE 15 MG: 15 TABLET, FILM COATED ORAL at 19:47

## 2022-01-15 RX ADMIN — MEGESTROL ACETATE 20 MG: 20 TABLET ORAL at 08:50

## 2022-01-15 RX ADMIN — CLOZAPINE 200 MG: 100 TABLET ORAL at 19:46

## 2022-01-15 RX ADMIN — BUSPIRONE HYDROCHLORIDE 30 MG: 15 TABLET ORAL at 19:47

## 2022-01-15 RX ADMIN — ATORVASTATIN CALCIUM 80 MG: 80 TABLET, FILM COATED ORAL at 19:47

## 2022-01-15 RX ADMIN — GABAPENTIN 100 MG: 100 CAPSULE ORAL at 08:50

## 2022-01-15 RX ADMIN — BUSPIRONE HYDROCHLORIDE 30 MG: 15 TABLET ORAL at 08:49

## 2022-01-15 RX ADMIN — GABAPENTIN 100 MG: 100 CAPSULE ORAL at 12:26

## 2022-01-15 RX ADMIN — HALOPERIDOL 1 MG: 1 TABLET ORAL at 19:47

## 2022-01-15 RX ADMIN — DONEPEZIL HYDROCHLORIDE 10 MG: 10 TABLET ORAL at 19:47

## 2022-01-15 RX ADMIN — MEMANTINE 10 MG: 10 TABLET ORAL at 19:47

## 2022-01-15 RX ADMIN — LEVOTHYROXINE SODIUM 88 MCG: 88 TABLET ORAL at 08:49

## 2022-01-15 RX ADMIN — ASPIRIN 81 MG CHEWABLE TABLET 81 MG: 81 TABLET CHEWABLE at 08:50

## 2022-01-15 RX ADMIN — GABAPENTIN 100 MG: 100 CAPSULE ORAL at 17:19

## 2022-01-15 NOTE — PLAN OF CARE
Nursing Assessment:  VT: /73 (BP Location: Right arm)   Pulse 92   Temp 97.5  F (36.4  C) (Axillary)   Resp 18   Ht 1.829 m (6')   Wt 74.1 kg (163 lb 4.8 oz)   SpO2 95%   BMI 22.15 kg/m       Additional Notes: pt slept most of the shift. He was compliant with medications and compliant with checking to make sure he swallowed medication. Pt is sometimes hard to understand as his speech is horse/gargled.    Nursing Plan:  Continue POC, monitor for COVID symptoms    Discharge Disposition:   Pt on SANCHEZ, monitoring COVID

## 2022-01-15 NOTE — PLAN OF CARE
Sitter is at bedside.  Patients speech is garbled.  Is tolerating regular diet.  Took all meds today, except he declined his inhaler.  Is not displaying any signs or symptoms of Covid.  Call light is within reach.  Is able to make needs known.  Will continue with plan of care.

## 2022-01-15 NOTE — PROGRESS NOTES
[facility]    Medicine Progress Note - Hospitalist Service       Date of Admission:  6/30/2021    Assessment & Plan      John Juárez is a 57 yo gentleman admitted on 1/13/22.  He has a PMH of COPD, cardiomyopathy, endocarditis s/p bioprosthetic valves (2015), CVA, HLD, hypothyroidism, TBI and schizophrenia.  He was hospitalized at [facility] for ~ a yr and then transferred to [facility] unit for ECT treatment where he was hospitalized 6/30 - 1/13/22.  He is currently under commitment with Yanez being amended to include Clozaril.  He is confused, disoriented and endorses possible visual hallucinations, however he denied auditory hallucination. Oriented to self only. Routine COVID-19 survelliance at station  on 1/13/22 was positive prompting his transfer to [facility].     COVID-19 infection    ---   Vaccinated with J&J vaccine on 8/24/2021  ---   Completely asymptomatic  ---   Tested positive on 1/13/22 during routine surveillance test  ---   On RA with O2 sat of 90-96%  ---   He does not qualify for Decadron or other COVID therapy  ---   Continue full barrier isolation  ---   Enoxaparin 40 mg sq daily for DVT prophylaxis     COPD:     ---   Lungs are clear on exam.    ---   On RA.  ---   Continue PTA salmeterol and albuterol PRN     H/o infective endocarditis with severe mitral and aortic regurgitation, s/p bioprosthetic valve replacement (10/2015)  ---   Follows w/ Dr. Dockery of [facility] V[facility] Card[facility], last seen in 1/2020.   ---   TTE 10/2021 w/ EF 55-60%, no evidence for bioprosthetic mitral and aortic valve dysfunction.   ---   F/u w/ outpt Cardiology upon d/c from the hospital (on every 2 year follow-ups)  ---   Continue ASA 81 mg daily     HFrEF, now recovered  ---   Compensated  ---   Not on any diuretics chronically.     Hypothyroidism  ---   TSH 2.71 on 11/4/2021.   ---   Continue PTA levothyroxine 88 mcg daily     Hx of  CVA   ---   Hx of basal ganglia stroke in 2015.   ---   No focal neuro deficits aside from cognitive dysfunction as above.   ---   Continue PTA ASA 81 mg and atorvastatin 80mg daily     Hyperlipidemia   ---   Continue PTA atorvastatin 80 mg daily.     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  ---   Confused and delusional  ---   Psychiatry consulted  ---   Management per psychiatry  ---   [facility] and [place] legal working on guardianship.   ---   SW to contact [facility] on  for information on this ([phone]).         Diet:   Regular Diet  DVT Prophylaxis: Enoxaparin (Lovenox) SQ and Ambulate every shift  Valentin Catheter: Not present  Central Lines: None  Code Status:   Full Code  Disposition Plan:   TBD.        Justin Martinez MD.   Hospitalist.  [phone], pager.    __________________________________________________________      Interval History   No complaints.  Confused  Has a bedside sitter    Data reviewed today: I reviewed all medications, new labs and imaging results over the last 24 hours.     Physical Exam   Vital Signs: Temp: 98  F (36.7  C) Temp src: Oral BP: 103/85 Pulse: 96   Resp: 18 SpO2: 96 % O2 Device: None (Room air)    Weight: 163 lbs 4.8 oz  General: confused and disoriented, NAD.  HEENT:  NC/AT, PERRL, EOMI, neck supple, no thyromegaly, op clear, mmm.  CVS:  NL s 1 and s2, no m/r/g.  Lungs:  CTA B/L.   Abd:  Soft, + bs, NT, no rebound or gaurding, no fluid shift.  Ext:  No c/c.  Lymph:  No edema.  Neuro:  Nonfocal.  Musculoskeletal: No calf tenderness to palpation.    Skin:  No rash.  Psychiatry:  Mood and affect appropriate.        Data   Recent Labs   Lab 01/13/22  0809 01/12/22  1329   WBC 6.5  --    HGB 13.3  --    MCV 90  --      --    NA  139  139 139   POTASSIUM 3.7  3.5 3.8   CHLORIDE 112*  112* 112*   CO2 19*  20 21   BUN 14  14 14   CR 0.62*  0.61* 0.53*   ANIONGAP 8  7 6   LESLEE 8.7  8.9 8.6   GLC 98  103* 167*   ALBUMIN 2.9*  --    PROTTOTAL 7.0  --    BILITOTAL 0.4  --    ALKPHOS 56  --    ALT 59  --    AST 69*  --      No results found for this or any previous visit (from the past 24 hour(s)).  Medications       aspirin  81 mg Oral Daily     atorvastatin  80 mg Oral At Bedtime     busPIRone  30 mg Oral BID     [Held by provider] cholecalciferol  1,250 mcg Oral Q7 Days     cloZAPine  200 mg Oral At Bedtime     donepezil  10 mg Oral At Bedtime     gabapentin  100 mg Oral TID w/meals     haloperidol  1 mg Oral At Bedtime     levothyroxine  88 mcg Oral Daily     megestrol  20 mg Oral Daily     memantine  10 mg Oral BID     [Held by provider] metoprolol tartrate  25 mg Oral BID     mirtazapine  15 mg Oral At Bedtime     salmeterol  1 puff Inhalation BID

## 2022-01-16 PROCEDURE — 120N000002 HC R&B MED SURG/OB UMMC

## 2022-01-16 PROCEDURE — 250N000013 HC RX MED GY IP 250 OP 250 PS 637: Performed by: PSYCHIATRY & NEUROLOGY

## 2022-01-16 PROCEDURE — 99232 SBSQ HOSP IP/OBS MODERATE 35: CPT | Performed by: INTERNAL MEDICINE

## 2022-01-16 RX ADMIN — LEVOTHYROXINE SODIUM 88 MCG: 88 TABLET ORAL at 08:04

## 2022-01-16 RX ADMIN — SALMETEROL XINAFOATE 1 PUFF: 50 POWDER, METERED ORAL; RESPIRATORY (INHALATION) at 08:16

## 2022-01-16 RX ADMIN — GABAPENTIN 100 MG: 100 CAPSULE ORAL at 17:09

## 2022-01-16 RX ADMIN — BUSPIRONE HYDROCHLORIDE 30 MG: 15 TABLET ORAL at 19:58

## 2022-01-16 RX ADMIN — GABAPENTIN 100 MG: 100 CAPSULE ORAL at 12:03

## 2022-01-16 RX ADMIN — DONEPEZIL HYDROCHLORIDE 10 MG: 10 TABLET ORAL at 19:55

## 2022-01-16 RX ADMIN — SALMETEROL XINAFOATE 1 PUFF: 50 POWDER, METERED ORAL; RESPIRATORY (INHALATION) at 19:58

## 2022-01-16 RX ADMIN — BUSPIRONE HYDROCHLORIDE 30 MG: 15 TABLET ORAL at 08:04

## 2022-01-16 RX ADMIN — MIRTAZAPINE 15 MG: 15 TABLET, FILM COATED ORAL at 19:55

## 2022-01-16 RX ADMIN — CLOZAPINE 200 MG: 100 TABLET ORAL at 19:55

## 2022-01-16 RX ADMIN — ASPIRIN 81 MG CHEWABLE TABLET 81 MG: 81 TABLET CHEWABLE at 08:04

## 2022-01-16 RX ADMIN — MEGESTROL ACETATE 20 MG: 20 TABLET ORAL at 08:03

## 2022-01-16 RX ADMIN — GABAPENTIN 100 MG: 100 CAPSULE ORAL at 08:04

## 2022-01-16 RX ADMIN — MEMANTINE 10 MG: 10 TABLET ORAL at 19:58

## 2022-01-16 RX ADMIN — HALOPERIDOL 1 MG: 1 TABLET ORAL at 19:58

## 2022-01-16 RX ADMIN — ATORVASTATIN CALCIUM 80 MG: 80 TABLET, FILM COATED ORAL at 19:55

## 2022-01-16 RX ADMIN — MEMANTINE 10 MG: 10 TABLET ORAL at 08:04

## 2022-01-16 NOTE — PROGRESS NOTES
[facility]    Medicine Progress Note - Hospitalist Service       Date of Admission:  6/30/2021    Assessment & Plan      John Juárez is a 57 yo gentleman admitted on 1/13/22.  He has a PMH of COPD, cardiomyopathy, endocarditis s/p bioprosthetic valves (2015), CVA, HLD, hypothyroidism, TBI and schizophrenia.  He was hospitalized at [facility] for ~ a yr and then transferred to [facility] mental [facility] unit for ECT treatment where he was hospitalized 6/30/21 - 1/13/22.  He is currently under commitment with Yanez being amended to include Clozaril.  He is confused, disoriented and endorses visual hallucinations, however he denied auditory hallucination.  Oriented to self only. Routine COVID-19 survelliance at station  on 1/13/22 was positive prompting his transfer to [facility].     COVID-19 infection    ---   Vaccinated with J&J vaccine on 8/24/2021  ---   Completely asymptomatic  ---   Tested positive on 1/13/22 during routine surveillance test  ---   On RA with O2 sat of 90-96%  ---   He does not qualify for Decadron or other COVID therapy  ---   Continue full barrier isolation  ---   Enoxaparin 40 mg sq daily for DVT prophylaxis     COPD:     ---   Lungs are clear on exam.    ---   On RA.  ---   Continue PTA salmeterol and albuterol PRN     H/o infective endocarditis with severe mitral and aortic regurgitation, s/p bioprosthetic valve replacement (10/2015)  ---   Follows w/ Dr. Dockery of Heart and Vascular Cardiology, last seen in 1/2020.   ---   TTE 10/2021 w/ EF 55-60%, no evidence for bioprosthetic mitral and aortic valve dysfunction.   ---   F/u w/ outpt Cardiology upon d/c from the hospital (on every 2 year follow-ups)  ---   Continue ASA 81 mg daily     HFrEF, now recovered  ---   Compensated  ---   Not on any diuretics chronically.     Hypothyroidism  ---   TSH 2.71 on 11/4/2021.   ---   Continue PTA levothyroxine 88 mcg daily     Hx of CVA   ---    Hx of basal ganglia stroke in 2015.   ---   No focal neuro deficits aside from cognitive dysfunction as above.   ---   Continue PTA ASA 81 mg and atorvastatin 80mg daily     Hyperlipidemia   ---   Continue PTA atorvastatin 80 mg daily.     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  ---   Confused and delusional  ---   Psychiatry consulted  ---   Management per psychiatry  ---   [facility] and [employer] legal working on guardianship.   ---   SW to contact [facility] on 3B for information on this ([phone]).         Diet:   Regular Diet  DVT Prophylaxis: Enoxaparin (Lovenox) SQ and Ambulate every shift  Valentin Catheter: Not present  Central Lines: None  Code Status:   Full Code  Disposition Plan:   TBD.        Justin Martinez MD.   Hospitalist.  [phone], pager.    __________________________________________________________      Interval History   No complaints.  Confused  Has a bedside sitter    Data reviewed today: I reviewed all medications, new labs and imaging results over the last 24 hours.     Physical Exam   Vital Signs: Temp: 97.8  F (36.6  C) Temp src: Oral BP: 113/87 Pulse: 96   Resp: 16 SpO2: 96 % O2 Device: None (Room air)    Weight: 163 lbs 4.8 oz  General: confused and disoriented, NAD.  HEENT:  NC/AT, PERRL, EOMI, neck supple, no thyromegaly, op clear, mmm.  CVS:  NL s 1 and s2, no m/r/g.  Lungs:  CTA B/L.   Abd:  Soft, + bs, NT, no rebound or gaurding, no fluid shift.  Ext:  No c/c.  Lymph:  No edema.  Neuro:  Nonfocal.  Musculoskeletal: No calf tenderness to palpation.    Skin:  No rash.  Psychiatry:  Mood and affect appropriate.        Data   Recent Labs   Lab 01/13/22  0809 01/12/22  1329   WBC 6.5  --    HGB 13.3  --    MCV 90  --      --      139 139   POTASSIUM 3.7  3.5 3.8   CHLORIDE 112*  112* 112*    CO2 19*  20 21   BUN 14  14 14   CR 0.62*  0.61* 0.53*   ANIONGAP 8  7 6   LESLEE 8.7  8.9 8.6   GLC 98  103* 167*   ALBUMIN 2.9*  --    PROTTOTAL 7.0  --    BILITOTAL 0.4  --    ALKPHOS 56  --    ALT 59  --    AST 69*  --      No results found for this or any previous visit (from the past 24 hour(s)).  Medications       aspirin  81 mg Oral Daily     atorvastatin  80 mg Oral At Bedtime     busPIRone  30 mg Oral BID     [Held by provider] cholecalciferol  1,250 mcg Oral Q7 Days     cloZAPine  200 mg Oral At Bedtime     donepezil  10 mg Oral At Bedtime     gabapentin  100 mg Oral TID w/meals     haloperidol  1 mg Oral At Bedtime     levothyroxine  88 mcg Oral Daily     megestrol  20 mg Oral Daily     memantine  10 mg Oral BID     [Held by provider] metoprolol tartrate  25 mg Oral BID     mirtazapine  15 mg Oral At Bedtime     salmeterol  1 puff Inhalation BID

## 2022-01-16 NOTE — PLAN OF CARE
Nursing Assessment:  VT: BP 96/79   Pulse 66   Temp 98.5  F (36.9  C) (Axillary)   Resp 20   Ht 1.829 m (6')   Wt 74.1 kg (163 lb 4.8 oz)   SpO2 95%   BMI 22.15 kg/m       Additional Note: Took all medications but declined inhaler. Having infrequent dry cough. 1:1 at bedside    Nursing Plan:  Continue POC

## 2022-01-16 NOTE — PLAN OF CARE
VSS. Oriented to self. Disoriented to time, place, and situation.  Denies numbness/tingling. Denies auditory and visual hallucinations this shift.   Pt became more agitated when writer asked assessment questions. No further assessment was able to be made. Pt refused skin assessment.   LS clear. Frequent productive cough  Pt remains cooperative.

## 2022-01-17 ENCOUNTER — TELEPHONE (OUTPATIENT)
Dept: BEHAVIORAL HEALTH | Facility: CLINIC | Age: 57
End: 2022-01-17
Payer: COMMERCIAL

## 2022-01-17 LAB
ANION GAP SERPL CALCULATED.3IONS-SCNC: 6 MMOL/L (ref 3–14)
BUN SERPL-MCNC: 12 MG/DL (ref 7–30)
CALCIUM SERPL-MCNC: 8.8 MG/DL (ref 8.5–10.1)
CHLORIDE BLD-SCNC: 112 MMOL/L (ref 94–109)
CO2 SERPL-SCNC: 21 MMOL/L (ref 20–32)
CREAT SERPL-MCNC: 0.65 MG/DL (ref 0.66–1.25)
GFR SERPL CREATININE-BSD FRML MDRD: >90 ML/MIN/1.73M2
GLUCOSE BLD-MCNC: 83 MG/DL (ref 70–99)
MAGNESIUM SERPL-MCNC: 2 MG/DL (ref 1.6–2.3)
PHOSPHATE SERPL-MCNC: 3.7 MG/DL (ref 2.5–4.5)
POTASSIUM BLD-SCNC: 3.9 MMOL/L (ref 3.4–5.3)
SODIUM SERPL-SCNC: 139 MMOL/L (ref 133–144)

## 2022-01-17 PROCEDURE — 99232 SBSQ HOSP IP/OBS MODERATE 35: CPT | Performed by: INTERNAL MEDICINE

## 2022-01-17 PROCEDURE — 250N000013 HC RX MED GY IP 250 OP 250 PS 637: Performed by: PSYCHIATRY & NEUROLOGY

## 2022-01-17 PROCEDURE — 83735 ASSAY OF MAGNESIUM: CPT | Performed by: PSYCHIATRY & NEUROLOGY

## 2022-01-17 PROCEDURE — 250N000013 HC RX MED GY IP 250 OP 250 PS 637: Performed by: INTERNAL MEDICINE

## 2022-01-17 PROCEDURE — 84100 ASSAY OF PHOSPHORUS: CPT | Performed by: PSYCHIATRY & NEUROLOGY

## 2022-01-17 PROCEDURE — 99207 PR CDG-MDM COMPONENT: MEETS MODERATE - DOWN CODED: CPT | Performed by: PSYCHIATRY & NEUROLOGY

## 2022-01-17 PROCEDURE — 99232 SBSQ HOSP IP/OBS MODERATE 35: CPT | Performed by: PSYCHIATRY & NEUROLOGY

## 2022-01-17 PROCEDURE — 120N000002 HC R&B MED SURG/OB UMMC

## 2022-01-17 PROCEDURE — 36415 COLL VENOUS BLD VENIPUNCTURE: CPT | Performed by: PSYCHIATRY & NEUROLOGY

## 2022-01-17 PROCEDURE — 250N000013 HC RX MED GY IP 250 OP 250 PS 637: Performed by: PHYSICIAN ASSISTANT

## 2022-01-17 PROCEDURE — 80048 BASIC METABOLIC PNL TOTAL CA: CPT | Performed by: PSYCHIATRY & NEUROLOGY

## 2022-01-17 PROCEDURE — 250N000011 HC RX IP 250 OP 636: Performed by: INTERNAL MEDICINE

## 2022-01-17 RX ORDER — POLYETHYLENE GLYCOL 3350 17 G/17G
17 POWDER, FOR SOLUTION ORAL 2 TIMES DAILY
Status: DISCONTINUED | OUTPATIENT
Start: 2022-01-17 | End: 2022-01-18 | Stop reason: HOSPADM

## 2022-01-17 RX ADMIN — POLYETHYLENE GLYCOL 3350 17 G: 17 POWDER, FOR SOLUTION ORAL at 20:31

## 2022-01-17 RX ADMIN — ENOXAPARIN SODIUM 40 MG: 40 INJECTION SUBCUTANEOUS at 17:17

## 2022-01-17 RX ADMIN — ATORVASTATIN CALCIUM 80 MG: 80 TABLET, FILM COATED ORAL at 19:19

## 2022-01-17 RX ADMIN — MEMANTINE 10 MG: 10 TABLET ORAL at 08:24

## 2022-01-17 RX ADMIN — LEVOTHYROXINE SODIUM 88 MCG: 88 TABLET ORAL at 08:24

## 2022-01-17 RX ADMIN — SALMETEROL XINAFOATE 1 PUFF: 50 POWDER, METERED ORAL; RESPIRATORY (INHALATION) at 19:21

## 2022-01-17 RX ADMIN — BUSPIRONE HYDROCHLORIDE 30 MG: 15 TABLET ORAL at 08:24

## 2022-01-17 RX ADMIN — GABAPENTIN 100 MG: 100 CAPSULE ORAL at 17:17

## 2022-01-17 RX ADMIN — BUSPIRONE HYDROCHLORIDE 30 MG: 15 TABLET ORAL at 19:18

## 2022-01-17 RX ADMIN — HALOPERIDOL 1 MG: 1 TABLET ORAL at 19:17

## 2022-01-17 RX ADMIN — DONEPEZIL HYDROCHLORIDE 10 MG: 10 TABLET ORAL at 19:19

## 2022-01-17 RX ADMIN — CLOZAPINE 200 MG: 100 TABLET ORAL at 19:18

## 2022-01-17 RX ADMIN — GABAPENTIN 100 MG: 100 CAPSULE ORAL at 08:24

## 2022-01-17 RX ADMIN — SALMETEROL XINAFOATE 1 PUFF: 50 POWDER, METERED ORAL; RESPIRATORY (INHALATION) at 11:59

## 2022-01-17 RX ADMIN — GABAPENTIN 100 MG: 100 CAPSULE ORAL at 11:57

## 2022-01-17 RX ADMIN — MEMANTINE 10 MG: 10 TABLET ORAL at 19:19

## 2022-01-17 RX ADMIN — MEGESTROL ACETATE 20 MG: 20 TABLET ORAL at 08:24

## 2022-01-17 RX ADMIN — MIRTAZAPINE 15 MG: 15 TABLET, FILM COATED ORAL at 19:16

## 2022-01-17 RX ADMIN — METOPROLOL TARTRATE 25 MG: 25 TABLET, FILM COATED ORAL at 19:17

## 2022-01-17 RX ADMIN — ASPIRIN 81 MG CHEWABLE TABLET 81 MG: 81 TABLET CHEWABLE at 08:24

## 2022-01-17 NOTE — PLAN OF CARE
Pt was admitted from station [facility] for COVID. Pt has a 1:1 sitter and has a Yanez for medications, elopement precautions, cheeking precautions,  and assault precautions. Pt was A and Ox2 - disorientated to time and situation. Pt is on RA and denied SOB and chest pain but has an infrequent and nonproductive cough. Pt was irritable but cooperative - denied skin assessment. Pt is on a regular diet but did not eat overnight. Pt is an assist x1 with a GB - pt was not OOB overnight. Pt does not have IV access and slept through the majority of the night.

## 2022-01-17 NOTE — PROGRESS NOTES
"[facility], [place]   Psychiatric Progress Note        Interim History:   The patient's care was discussed with the treatment team during the daily team meeting and/or staff's chart notes were reviewed.  Staff report patient has been doing okay on the medical unit. Didn't eat much dinner last night.     The patient reports that he is \"all right.\" Says that he is sleeping and eating well. Looking forward to eggs for breakfast. Denies SI. Says that his weekend was \"all right.\" Discussed patient's progress and return to  but unclear if patient was tracking.          Medications:       aspirin  81 mg Oral Daily     atorvastatin  80 mg Oral At Bedtime     busPIRone  30 mg Oral BID     [Held by provider] cholecalciferol  1,250 mcg Oral Q7 Days     cloZAPine  200 mg Oral At Bedtime     donepezil  10 mg Oral At Bedtime     gabapentin  100 mg Oral TID w/meals     haloperidol  1 mg Oral At Bedtime     levothyroxine  88 mcg Oral Daily     megestrol  20 mg Oral Daily     memantine  10 mg Oral BID     [Held by provider] metoprolol tartrate  25 mg Oral BID     mirtazapine  15 mg Oral At Bedtime     salmeterol  1 puff Inhalation BID          Allergies:     Allergies   Allergen Reactions     Ibuprofen      Latex           Labs:     No results found for this or any previous visit (from the past 24 hour(s)).       Psychiatric Examination:     BP (!) 125/94 (BP Location: Right arm)   Pulse 88   Temp 97.8  F (36.6  C) (Oral)   Resp 18   Ht 1.829 m (6')   Wt 74.1 kg (163 lb 4.8 oz)   SpO2 99%   BMI 22.15 kg/m    Weight is 163 lbs 4.8 oz  Body mass index is 22.15 kg/m .  Orthostatic Vitals  Report      Most Recent      Lying Orthostatic /87 01/02 0906    Sitting Orthostatic /87 01/02 0906    Sitting Orthostatic Pulse (bpm) 84 01/02 0906            Appearance: awake, alert and poorly groomed  Attitude:  cooperative  Eye Contact:  fair  Mood:  \"all right\"  Affect:  intensity is " flat  Speech:  mumbling  Psychomotor Behavior:  no evidence of tardive dyskinesia, dystonia, or tics  Thought Process:  concrete  Associations:  no loose associations  Thought Content:  no evidence of suicidal ideation or homicidal ideation and obsessions present  Insight:  partial  Judgement:  limited  Oriented to:  person only  Attention Span and Concentration:  fair  Recent and Remote Memory:  poor    Clinical Global Impressions  First:  Considering your total clinical experience with this particular patient population, how severe are the patient's symptoms at this time?: 7 (07/01/21 0630)  Compared to the patient's condition at the START of treatment, this patient's condition is: 4 (07/01/21 0630)  Most recent:  Considering your total clinical experience with this particular patient population, how severe are the patient's symptoms at this time?: 4 (01/03/22 1344)  Compared to the patient's condition at the START of treatment, this patient's condition is: 2 (01/03/22 1344)         Precautions:     Behavioral Orders   Procedures     Assault precautions     Cheeking Precautions (behavioral units)     Patient Observed swallowing PO medications; Patient asked to drink water after swallowing medication; Patient in Staff line of sight for 15 minutes after medication given; Mouth checks after PO administration (patient asked to open mouth and stick out their tongue).     Code 1     Code 2     With Security for any imaging/testing needed.     Elopement precautions     Fall precautions     Routine Programming     As clinically indicated     Single Room     Status 15     Every 15 minutes.          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   2) Continue Remeron, Buspar and gabapentin.   3)  Continue Aricept and Namenda.   4) Patient is committed with Yanez.   5) [facility] and [facility] is working on guardianship and placement.   6) Will continue to follow patient at least 3/week while on medicine.         Disposition Plan   Reason for ongoing admission: is unable to care for self due to severe psychosis or mariusz  Discharge location: group home  Discharge Medications: not ordered  Follow-up Appointments: not scheduled  Legal Status: full commitment    Entered by: Wilton Morfin on January 17, 2022 at 8:55 AM

## 2022-01-17 NOTE — PLAN OF CARE
/81 (BP Location: Right arm)   Pulse 85   Temp 98.2  F (36.8  C) (Oral)   Resp 18   Ht 1.829 m (6')   Wt 74.1 kg (163 lb 4.8 oz)   SpO2 96%   BMI 22.15 kg/m    RA    Patient is unsteady on feet needs assist x1 with gait belt, may refuse gait belt. Patient refused full skin assessment, visible skin intact. Disoriented to time, place, and situation, reported no N/T, no auditory or visual hallucinations reported this evening, Patient can be seen moving lips and talking to self very quietly in room, can be slow to respond to questions. Patient has sitter in room, on fall, assault, cheeking, and elopement protocols.   Continue plan of care, transport back to psych once COVID recovered.

## 2022-01-17 NOTE — PLAN OF CARE
Pt sleeping this morning until late only getting up to go to the bathroom. When asked pt questions he would usually answer with 1-2 words. Stated that he did not feel like he slept well last night. Disoriented to time and situation. Was asking about when he would be able to go home, reminded pt that he is still COVID positive and the plan was when he was COVID recovered he would go back to inpatient psych. Needed encouragement to eat breakfast/lunch, poor appetite. No BM today, last was 14th per report. Bowel sounds were active. Denies any pain. Also sleeping on and off throughout afternoon. Took pills without difficulty, swallowed all (did try to pocket pills today). 1:1 at bedside.

## 2022-01-17 NOTE — TELEPHONE ENCOUNTER
S: Pt placed on Intake waitlist for placement on IP    B: Pt was transferred to medical unit due to COVID+ results while on [facility] unit. Pt is ready for transfer back to [facility] once bed becomes available.     A: Pt hx of schizophrenia, currently presenting disoriented and manic.     R: Pt remains on waitlist pending bed availability in [facility].   No beds available at this time.

## 2022-01-17 NOTE — PROGRESS NOTES
[facility]    Medicine Progress Note - Hospitalist Service       Date of Admission:  6/30/2021    Assessment & Plan         John Juárez is a 57 yo gentleman admitted on 1/13/22.  He has a PMH of COPD, cardiomyopathy, endocarditis s/p bioprosthetic valves (2015), CVA, HLD, hypothyroidism, TBI and schizophrenia.  He was hospitalized at [facility] for ~ a yr and then transferred to [facility] unit for ECT treatment where he was hospitalized 6/30/21 - 1/13/22.  He is currently under commitment with Yanez being amended to include Clozaril.  He is confused, disoriented and endorses visual hallucinations, however he denied auditory hallucination.  Oriented to self only. Routine COVID-19 survelliance at [facility] on 1/13/22 was positive prompting his transfer to [facility].    Today's changes: 1/17/2022  Doing well.   Constipation.  Increase MiraLAX to twice daily.  No other medical concern/ changes.     Per Psychiatry:     1) Continue Clozaril and Haldol.   2) Continue Remeron, Buspar and gabapentin.   3) Continue Aricept and Namenda.   4) Patient is committed with Yanez.   5) [facility] and [place] legal is working on guardianship and placement.   6) Will continue to follow patient at least 3/week while on medicine.               COVID-19 infection    ---   Vaccinated with J&J vaccine on 8/24/2021  ---   Completely asymptomatic  ---   Tested positive on 1/13/22 during routine surveillance test  ---   On RA with O2 sat of 90-96%  ---   He does not qualify for Decadron or other COVID therapy  ---   Continue full barrier isolation  ---   Enoxaparin 40 mg sq daily for DVT prophylaxis     COPD:     ---   Lungs are clear on exam.    ---   On RA.  ---   Continue PTA salmeterol and albuterol PRN     H/o infective endocarditis with severe mitral and aortic regurgitation, s/p bioprosthetic valve replacement (10/2015)  ---   Follows w/ Dr. Dockery [facility], last seen in 1/2020.   ---   TTE 10/2021 w/ EF 55-60%, no evidence for bioprosthetic mitral and aortic valve dysfunction.   ---   F/u w/ outpt Cardiology upon d/c from the hospital (on every 2 year follow-ups)  ---   Continue ASA 81 mg daily     HFrEF, now recovered  ---   Compensated  ---   Not on any diuretics chronically.     Hypothyroidism  ---   TSH 2.71 on 11/4/2021.   ---   Continue PTA levothyroxine 88 mcg daily     Hx of CVA   ---   Hx of basal ganglia stroke in 2015.   ---   No focal neuro deficits aside from cognitive dysfunction as above.   ---   Continue PTA ASA 81 mg and atorvastatin 80mg daily     Hyperlipidemia   ---   Continue PTA atorvastatin 80 mg daily.     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  ---   Confused and delusional  ---   Psychiatry consulted  ---   Management per psychiatry  ---   [facility] and [place] legal working on guardianship.   ---   SW to contact [facility] on 3B for information on this ([phone]).         Diet:   Regular Diet  DVT Prophylaxis: Enoxaparin (Lovenox) SQ and Ambulate every shift  Valentin Catheter: Not present  Central Lines: None  Code Status:   Full Code  Disposition Plan:   Per psychiatry, Sw.     Damion Pinon MD  [facility]  Contact information available via [facility] Paging/Directory    __________________________________________________________      Interval History   No significant event overnight  Overall doing better   Afebrile   No chest pain or shortness of breath   No nausea vomiting diarrhea   Constipation   Mood stable   Has a bedside sitter    Data reviewed today: I reviewed all medications, new labs and imaging results over the last 24 hours.     Physical Exam   Vital Signs: Temp: 97.8  F (36.6  C)  Temp src: Oral BP: (!) 125/94 Pulse: 88   Resp: 18 SpO2: 99 % O2 Device: None (Room air)    Weight: 163 lbs 4.8 oz      General Appearance: Awake, interactive, NAD  Respiratory: Normal work of breathing.  Cardiovascular: RRR  GI: Soft. NT. ND.  Extremities: Distally wwp. No pedal edema  Skin: No acute rash on exposed areas.   Neuro: Grossly non focal.    Others: Flat affect.            Data   Recent Labs   Lab 01/17/22  1225 01/13/22  0809 01/12/22  1329   WBC  --  6.5  --    HGB  --  13.3  --    MCV  --  90  --    PLT  --  156  --     139  139 139   POTASSIUM 3.9 3.7  3.5 3.8   CHLORIDE 112* 112*  112* 112*   CO2 21 19*  20 21   BUN 12 14  14 14   CR 0.65* 0.62*  0.61* 0.53*   ANIONGAP 6 8  7 6   LESLEE 8.8 8.7  8.9 8.6   GLC 83 98  103* 167*   ALBUMIN  --  2.9*  --    PROTTOTAL  --  7.0  --    BILITOTAL  --  0.4  --    ALKPHOS  --  56  --    ALT  --  59  --    AST  --  69*  --      No results found for this or any previous visit (from the past 24 hour(s)).  Medications       aspirin  81 mg Oral Daily     atorvastatin  80 mg Oral At Bedtime     busPIRone  30 mg Oral BID     [Held by provider] cholecalciferol  1,250 mcg Oral Q7 Days     cloZAPine  200 mg Oral At Bedtime     donepezil  10 mg Oral At Bedtime     gabapentin  100 mg Oral TID w/meals     haloperidol  1 mg Oral At Bedtime     levothyroxine  88 mcg Oral Daily     megestrol  20 mg Oral Daily     memantine  10 mg Oral BID     [Held by provider] metoprolol tartrate  25 mg Oral BID     mirtazapine  15 mg Oral At Bedtime     salmeterol  1 puff Inhalation BID

## 2022-01-18 ENCOUNTER — HOSPITAL ENCOUNTER (INPATIENT)
Facility: CLINIC | Age: 57
LOS: 8 days | Discharge: PSYCHIATRIC OR SUBSTANCE USE FACILITY | DRG: 885 | End: 2022-01-26
Attending: PSYCHIATRY & NEUROLOGY | Admitting: PSYCHIATRY & NEUROLOGY
Payer: COMMERCIAL

## 2022-01-18 VITALS
OXYGEN SATURATION: 95 % | SYSTOLIC BLOOD PRESSURE: 103 MMHG | HEIGHT: 72 IN | HEART RATE: 79 BPM | WEIGHT: 163.3 LBS | BODY MASS INDEX: 22.12 KG/M2 | TEMPERATURE: 98 F | RESPIRATION RATE: 18 BRPM | DIASTOLIC BLOOD PRESSURE: 80 MMHG

## 2022-01-18 DIAGNOSIS — K59.09 OTHER CONSTIPATION: Primary | ICD-10-CM

## 2022-01-18 PROBLEM — F29 PSYCHOSIS (H): Status: ACTIVE | Noted: 2022-01-18

## 2022-01-18 LAB
D DIMER PPP FEU-MCNC: 0.89 UG/ML FEU (ref 0–0.5)
ERYTHROCYTE [DISTWIDTH] IN BLOOD BY AUTOMATED COUNT: 15.6 % (ref 10–15)
HCT VFR BLD AUTO: 38.8 % (ref 40–53)
HGB BLD-MCNC: 13 G/DL (ref 13.3–17.7)
MCH RBC QN AUTO: 30.2 PG (ref 26.5–33)
MCHC RBC AUTO-ENTMCNC: 33.5 G/DL (ref 31.5–36.5)
MCV RBC AUTO: 90 FL (ref 78–100)
PLATELET # BLD AUTO: 178 10E3/UL (ref 150–450)
RBC # BLD AUTO: 4.3 10E6/UL (ref 4.4–5.9)
WBC # BLD AUTO: 3.7 10E3/UL (ref 4–11)

## 2022-01-18 PROCEDURE — 128N000001 HC R&B CD/MH ADULT

## 2022-01-18 PROCEDURE — 99239 HOSP IP/OBS DSCHRG MGMT >30: CPT | Performed by: HOSPITALIST

## 2022-01-18 PROCEDURE — 250N000013 HC RX MED GY IP 250 OP 250 PS 637: Performed by: PSYCHIATRY & NEUROLOGY

## 2022-01-18 PROCEDURE — 85379 FIBRIN DEGRADATION QUANT: CPT | Performed by: INTERNAL MEDICINE

## 2022-01-18 PROCEDURE — 250N000013 HC RX MED GY IP 250 OP 250 PS 637: Performed by: PHYSICIAN ASSISTANT

## 2022-01-18 PROCEDURE — 85027 COMPLETE CBC AUTOMATED: CPT | Performed by: INTERNAL MEDICINE

## 2022-01-18 PROCEDURE — 36415 COLL VENOUS BLD VENIPUNCTURE: CPT | Performed by: INTERNAL MEDICINE

## 2022-01-18 RX ORDER — METOPROLOL TARTRATE 25 MG/1
25 TABLET, FILM COATED ORAL 2 TIMES DAILY
Status: ON HOLD | DISCHARGE
Start: 2022-01-18 | End: 2022-01-26

## 2022-01-18 RX ORDER — BUSPIRONE HYDROCHLORIDE 10 MG/1
30 TABLET ORAL 2 TIMES DAILY
Status: DISCONTINUED | OUTPATIENT
Start: 2022-01-18 | End: 2022-01-26 | Stop reason: HOSPADM

## 2022-01-18 RX ORDER — HALOPERIDOL 1 MG/1
1 TABLET ORAL AT BEDTIME
Status: DISCONTINUED | OUTPATIENT
Start: 2022-01-18 | End: 2022-01-26 | Stop reason: HOSPADM

## 2022-01-18 RX ORDER — GABAPENTIN 100 MG/1
100 CAPSULE ORAL
Status: DISCONTINUED | OUTPATIENT
Start: 2022-01-18 | End: 2022-01-26 | Stop reason: HOSPADM

## 2022-01-18 RX ORDER — MEMANTINE HYDROCHLORIDE 10 MG/1
10 TABLET ORAL 2 TIMES DAILY
Status: DISCONTINUED | OUTPATIENT
Start: 2022-01-18 | End: 2022-01-26 | Stop reason: HOSPADM

## 2022-01-18 RX ORDER — CLOZAPINE 100 MG/1
200 TABLET ORAL AT BEDTIME
Status: DISCONTINUED | OUTPATIENT
Start: 2022-01-18 | End: 2022-01-26 | Stop reason: HOSPADM

## 2022-01-18 RX ORDER — RISPERIDONE 1 MG/1
1 TABLET, ORALLY DISINTEGRATING ORAL EVERY 6 HOURS PRN
Status: DISCONTINUED | OUTPATIENT
Start: 2022-01-18 | End: 2022-01-26 | Stop reason: HOSPADM

## 2022-01-18 RX ORDER — DONEPEZIL HYDROCHLORIDE 5 MG/1
10 TABLET, FILM COATED ORAL AT BEDTIME
Status: DISCONTINUED | OUTPATIENT
Start: 2022-01-18 | End: 2022-01-26 | Stop reason: HOSPADM

## 2022-01-18 RX ORDER — POLYETHYLENE GLYCOL 3350 17 G/17G
17 POWDER, FOR SOLUTION ORAL 2 TIMES DAILY
Qty: 510 G | Refills: 0 | Status: ON HOLD | DISCHARGE
Start: 2022-01-18 | End: 2022-01-26

## 2022-01-18 RX ORDER — MIRTAZAPINE 7.5 MG/1
15 TABLET, FILM COATED ORAL AT BEDTIME
Status: DISCONTINUED | OUTPATIENT
Start: 2022-01-18 | End: 2022-01-26 | Stop reason: HOSPADM

## 2022-01-18 RX ADMIN — LEVOTHYROXINE SODIUM 88 MCG: 88 TABLET ORAL at 08:51

## 2022-01-18 RX ADMIN — MEGESTROL ACETATE 20 MG: 20 TABLET ORAL at 08:51

## 2022-01-18 RX ADMIN — ASPIRIN 81 MG CHEWABLE TABLET 81 MG: 81 TABLET CHEWABLE at 08:51

## 2022-01-18 RX ADMIN — MIRTAZAPINE 15 MG: 7.5 TABLET, FILM COATED ORAL at 20:42

## 2022-01-18 RX ADMIN — DONEPEZIL HYDROCHLORIDE 10 MG: 5 TABLET, FILM COATED ORAL at 20:42

## 2022-01-18 RX ADMIN — METOPROLOL TARTRATE 25 MG: 25 TABLET, FILM COATED ORAL at 08:51

## 2022-01-18 RX ADMIN — MEMANTINE 10 MG: 10 TABLET ORAL at 08:51

## 2022-01-18 RX ADMIN — GABAPENTIN 100 MG: 100 CAPSULE ORAL at 11:44

## 2022-01-18 RX ADMIN — CLOZAPINE 200 MG: 100 TABLET ORAL at 20:43

## 2022-01-18 RX ADMIN — MEMANTINE HYDROCHLORIDE 10 MG: 10 TABLET, FILM COATED ORAL at 20:43

## 2022-01-18 RX ADMIN — BUSPIRONE HYDROCHLORIDE 30 MG: 10 TABLET ORAL at 20:42

## 2022-01-18 RX ADMIN — SALMETEROL XINAFOATE 1 PUFF: 50 POWDER, METERED ORAL; RESPIRATORY (INHALATION) at 08:55

## 2022-01-18 RX ADMIN — GABAPENTIN 100 MG: 100 CAPSULE ORAL at 08:51

## 2022-01-18 RX ADMIN — HALOPERIDOL 1 MG: 1 TABLET ORAL at 20:43

## 2022-01-18 RX ADMIN — BUSPIRONE HYDROCHLORIDE 30 MG: 15 TABLET ORAL at 08:51

## 2022-01-18 RX ADMIN — GABAPENTIN 100 MG: 100 CAPSULE ORAL at 18:21

## 2022-01-18 ASSESSMENT — ACTIVITIES OF DAILY LIVING (ADL)
WERE_AUXILIARY_AIDS_OFFERED?: NO
WALKING_OR_CLIMBING_STAIRS_DIFFICULTY: NO
DIFFICULTY_COMMUNICATING: NO
WEAR_GLASSES_OR_BLIND: NO
HYGIENE/GROOMING: HANDWASHING;INDEPENDENT
TOILETING_ISSUES: NO
DRESS: SCRUBS (BEHAVIORAL HEALTH);INDEPENDENT
FALL_HISTORY_WITHIN_LAST_SIX_MONTHS: NO
DRESSING/BATHING_DIFFICULTY: NO
ADL_ASSESSMENT: WDL
PATIENT'S_PREFERRED_MEANS_OF_COMMUNICATION: VERBAL
EQUIPMENT_CURRENTLY_USED_AT_HOME: WALKER, ROLLING
DESCRIBE_HEARING_LOSS: BILATERAL HEARING LOSS
DIFFICULTY_EATING/SWALLOWING: NO
CONCENTRATING,_REMEMBERING_OR_MAKING_DECISIONS_DIFFICULTY: YES
ORAL_HYGIENE: INDEPENDENT
DOING_ERRANDS_INDEPENDENTLY_DIFFICULTY: NO
PATIENT_/_FAMILY_COMMUNICATION_STYLE: SPOKEN LANGUAGE (ENGLISH OR BILINGUAL)
HEARING_DIFFICULTY_OR_DEAF: YES

## 2022-01-18 ASSESSMENT — MIFFLIN-ST. JEOR: SCORE: 1596.46
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
## 2022-01-18 NOTE — PLAN OF CARE
VS: /81   Pulse 88   Temp 98.2  F (36.8  C) (Axillary)   Resp 18   Ht 1.829 m (6')   Wt 74.1 kg (163 lb 4.8 oz)   SpO2 97%   BMI 22.15 kg/m     O2: RA, Non symptomatic covid   Output: Voids spontaneously   Last BM: 1/14/2022   Activity: Up with assist of 1   Up for meals? Yes   Skin: WDL   Pain: Denies   CMS: Disoriented to place and situation. Cap refill <3 seconds, Denies N/T   Dressing: None   Diet: Regular   LDA: None   Equipment: 1:1 Sitter, sitter computer and chair   Plan: Ride out COVID, then back to psych   Additional Info:

## 2022-01-18 NOTE — PLAN OF CARE
"  Problem: Urinary Incontinence  Goal: Effective Urinary Elimination  Outcome: No Change     Problem: Suicidal Behavior  Goal: Suicidal Behavior is Absent or Managed  Outcome: Improving    Pt alert and oriented X3 after being told 6 times he is in [facility]. Thought process was forgetful and tangential. Speech is muffled and elementary. Patient's verbal reports match affect and behavior.    Pain 00/10, anxiety 5/10, Depression 9/10, Denies SI and HI, Denies VH jbut states AH sayng \"you gotta get the fuck out of here\". Contracts for safety.   Appetite is good eating 3 snacks. Bowels are not moving normally , stated last BM  days ago. VSS.    Pt was observed calm, sleeping  and in own room for the majority of the shift. Pt did not want to participate in groups. Pt was med compliant. Pt sleeps well.  No other behaviors noted this shift.  Pt remains on falls precautions for safety.      Uses no ambulatory aids.      Ella Parson RN, CMSRN  [facility]          "

## 2022-01-18 NOTE — PLAN OF CARE
/82 (BP Location: Right arm)   Pulse 82   Temp 98.2  F (36.8  C) (Oral)   Resp 18   Ht 1.829 m (6')   Wt 74.1 kg (163 lb 4.8 oz)   SpO2 95%   BMI 22.15 kg/m    On RA, denies SOB, chest pain. Denied pain, numbness and tingling. Pt is oriented to self and place, disoriented to time and situation. Pt had bedside attending until 3 am this shift. Pt has not had bedside attending after 3 am, due to lack of staffing. Pt has bed alarm on and was frequently checked for the rest of the shift by this writer. Pt spoke softly during assessment. Pt has been sleeping all night.

## 2022-01-18 NOTE — PLAN OF CARE
Patient is alert to self, disorientated to time, place and situation. Sitter at bedside and assisting with needs.  VSS, LS slightly diminished throughout. CMS intact. No complaints of pain. BS present , Patient is passing gas, no bm this shift, given mirilax, Voiding in the toilet. Tolerating regular diet, has a poor appetite. Denies dizziness, SOB & CP. Report given to EMS, being transported to [facility]. Report called to [facility].

## 2022-01-19 PROBLEM — F20.9 CHRONIC SCHIZOPHRENIA (H): Status: ACTIVE | Noted: 2020-09-10

## 2022-01-19 PROBLEM — J44.9 COPD (CHRONIC OBSTRUCTIVE PULMONARY DISEASE) (H): Status: ACTIVE | Noted: 2020-09-09

## 2022-01-19 PROBLEM — F03.918 DEMENTIA WITH BEHAVIORAL DISTURBANCE (H): Status: ACTIVE | Noted: 2020-09-09

## 2022-01-19 PROBLEM — S06.9XAA TBI (TRAUMATIC BRAIN INJURY) (H): Status: ACTIVE | Noted: 2020-09-09

## 2022-01-19 PROCEDURE — 250N000011 HC RX IP 250 OP 636

## 2022-01-19 PROCEDURE — 99222 1ST HOSP IP/OBS MODERATE 55: CPT | Performed by: PSYCHIATRY & NEUROLOGY

## 2022-01-19 PROCEDURE — 250N000013 HC RX MED GY IP 250 OP 250 PS 637: Performed by: PSYCHIATRY & NEUROLOGY

## 2022-01-19 PROCEDURE — 128N000001 HC R&B CD/MH ADULT

## 2022-01-19 PROCEDURE — 99222 1ST HOSP IP/OBS MODERATE 55: CPT

## 2022-01-19 PROCEDURE — 250N000013 HC RX MED GY IP 250 OP 250 PS 637

## 2022-01-19 RX ORDER — METOPROLOL TARTRATE 25 MG/1
25 TABLET, FILM COATED ORAL 2 TIMES DAILY
Status: DISCONTINUED | OUTPATIENT
Start: 2022-01-19 | End: 2022-01-23 | Stop reason: CLARIF

## 2022-01-19 RX ORDER — ALBUTEROL SULFATE 90 UG/1
2 AEROSOL, METERED RESPIRATORY (INHALATION) EVERY 6 HOURS PRN
Status: DISCONTINUED | OUTPATIENT
Start: 2022-01-19 | End: 2022-01-19

## 2022-01-19 RX ORDER — POLYETHYLENE GLYCOL 3350 17 G/17G
17 POWDER, FOR SOLUTION ORAL DAILY
Status: DISCONTINUED | OUTPATIENT
Start: 2022-01-19 | End: 2022-01-26 | Stop reason: HOSPADM

## 2022-01-19 RX ORDER — ASPIRIN 81 MG/1
81 TABLET ORAL DAILY
Status: DISCONTINUED | OUTPATIENT
Start: 2022-01-19 | End: 2022-01-26 | Stop reason: HOSPADM

## 2022-01-19 RX ORDER — ATORVASTATIN CALCIUM 40 MG/1
80 TABLET, FILM COATED ORAL AT BEDTIME
Status: DISCONTINUED | OUTPATIENT
Start: 2022-01-19 | End: 2022-01-26 | Stop reason: HOSPADM

## 2022-01-19 RX ORDER — ALBUTEROL SULFATE 90 UG/1
1-2 AEROSOL, METERED RESPIRATORY (INHALATION) EVERY 4 HOURS PRN
Status: DISCONTINUED | OUTPATIENT
Start: 2022-01-19 | End: 2022-01-26 | Stop reason: HOSPADM

## 2022-01-19 RX ORDER — AMOXICILLIN 250 MG
1 CAPSULE ORAL 2 TIMES DAILY
Status: DISCONTINUED | OUTPATIENT
Start: 2022-01-19 | End: 2022-01-26 | Stop reason: HOSPADM

## 2022-01-19 RX ORDER — LEVOTHYROXINE SODIUM 88 UG/1
88 TABLET ORAL DAILY
Status: DISCONTINUED | OUTPATIENT
Start: 2022-01-19 | End: 2022-01-26 | Stop reason: HOSPADM

## 2022-01-19 RX ORDER — BISACODYL 10 MG
10 SUPPOSITORY, RECTAL RECTAL DAILY PRN
Status: DISCONTINUED | OUTPATIENT
Start: 2022-01-19 | End: 2022-01-26 | Stop reason: HOSPADM

## 2022-01-19 RX ADMIN — ATORVASTATIN CALCIUM 80 MG: 40 TABLET, FILM COATED ORAL at 20:45

## 2022-01-19 RX ADMIN — BUSPIRONE HYDROCHLORIDE 30 MG: 10 TABLET ORAL at 10:03

## 2022-01-19 RX ADMIN — BUSPIRONE HYDROCHLORIDE 30 MG: 10 TABLET ORAL at 20:46

## 2022-01-19 RX ADMIN — GABAPENTIN 100 MG: 100 CAPSULE ORAL at 10:03

## 2022-01-19 RX ADMIN — ENOXAPARIN SODIUM 40 MG: 40 INJECTION SUBCUTANEOUS at 20:48

## 2022-01-19 RX ADMIN — POLYETHYLENE GLYCOL 3350 17 G: 17 POWDER, FOR SOLUTION ORAL at 15:37

## 2022-01-19 RX ADMIN — MEMANTINE HYDROCHLORIDE 10 MG: 10 TABLET, FILM COATED ORAL at 10:03

## 2022-01-19 RX ADMIN — SENNOSIDES AND DOCUSATE SODIUM 1 TABLET: 50; 8.6 TABLET ORAL at 20:49

## 2022-01-19 RX ADMIN — CLOZAPINE 200 MG: 100 TABLET ORAL at 20:46

## 2022-01-19 RX ADMIN — MIRTAZAPINE 15 MG: 7.5 TABLET, FILM COATED ORAL at 20:45

## 2022-01-19 RX ADMIN — SALMETEROL XINAFOATE 1 PUFF: 50 POWDER, METERED ORAL; RESPIRATORY (INHALATION) at 20:51

## 2022-01-19 RX ADMIN — GABAPENTIN 100 MG: 100 CAPSULE ORAL at 12:36

## 2022-01-19 RX ADMIN — GABAPENTIN 100 MG: 100 CAPSULE ORAL at 20:46

## 2022-01-19 RX ADMIN — HALOPERIDOL 1 MG: 1 TABLET ORAL at 22:13

## 2022-01-19 RX ADMIN — DONEPEZIL HYDROCHLORIDE 10 MG: 5 TABLET, FILM COATED ORAL at 20:45

## 2022-01-19 RX ADMIN — MEMANTINE HYDROCHLORIDE 10 MG: 10 TABLET, FILM COATED ORAL at 20:46

## 2022-01-19 RX ADMIN — ASPIRIN 81 MG: 81 TABLET, COATED ORAL at 15:36

## 2022-01-19 ASSESSMENT — ACTIVITIES OF DAILY LIVING (ADL)
ORAL_HYGIENE: INDEPENDENT
LAUNDRY: UNABLE TO COMPLETE
HYGIENE/GROOMING: INDEPENDENT
ORAL_HYGIENE: INDEPENDENT
DRESS: SCRUBS (BEHAVIORAL HEALTH)
HYGIENE/GROOMING: HANDWASHING;INDEPENDENT
DRESS: SCRUBS (BEHAVIORAL HEALTH)
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
## 2022-01-19 NOTE — PHARMACY-ADMISSION MEDICATION HISTORY
Pharmacy Note - Admission Medication History    Pertinent Provider Information:     The patient has been in the hospital since 09/2020 so the PTA med list reflects the discharge medication summary list from [facility].    The following medications the patient was NOT taking per the MAR but were listed on the discharge summary list:  Cepacol, Systane eye drops, Senna-S, Maalox, melatonin, nicotine gum, risperidone ODT prn, and the albuterol inhaler.    The following medications had some doses on the MAR that were held on a few occasions:  Metoprolol, miralax.    The following medications were listed on the PTA med list and the patient was taking them according to the MAR:  Buspirone, clozapine, donepezil, enoxaparin, gabapentin, megestrol, metoprolol, mirtazapine, haloperidol, memantine, miralax, aspirin, atorvastatin, levothyroxine, and the serevent inhaler.     ______________________________________________________________________    Prior To Admission (PTA) med list completed and updated in EMR.       PTA Med List   Medication Sig Last Dose     acetaminophen (TYLENOL) 325 MG tablet Take 2 tablets (650 mg) by mouth every 4 hours as needed for mild pain Unknown at Unknown time     albuterol (PROAIR HFA/PROVENTIL HFA/VENTOLIN HFA) 108 (90 Base) MCG/ACT inhaler Inhale 1-2 puffs into the lungs every 4 hours as needed for shortness of breath / dyspnea or wheezing Unknown at Unknown time     alum & mag hydroxide-simethicone (MAALOX) 200-200-20 MG/5ML SUSP suspension Take 30 mLs by mouth every 4 hours as needed for indigestion Unknown at Unknown time     aspirin 81 MG EC tablet Take 1 tablet (81 mg) by mouth daily 1/18/2022 at 0900     atorvastatin (LIPITOR) 80 MG tablet Take 1 tablet (80 mg) by mouth At Bedtime 1/17/2022 at 1919     benzocaine-menthol (CEPACOL) 15-3.6 MG lozenge Place 1 lozenge inside cheek every hour as needed for moderate pain Unknown at Unknown time     busPIRone (BUSPAR) 30 MG tablet Take 1 tablet (30 mg)  by mouth 2 times daily 1/18/2022 at 0900     cloZAPine (CLOZARIL) 200 MG tablet Take 1 tablet (200 mg) by mouth At Bedtime 1/17/2022 at 1918     donepezil (ARICEPT) 10 MG tablet Take 1 tablet (10 mg) by mouth At Bedtime 1/17/2022 at 1919     enoxaparin ANTICOAGULANT (LOVENOX) 40 MG/0.4ML syringe Inject 0.4 mLs (40 mg) Subcutaneous every 24 hours 1/17/2022 at 1717     gabapentin (NEURONTIN) 100 MG capsule Take 1 capsule (100 mg) by mouth 3 times daily (with meals) 1/18/2022 at 1144     haloperidol (HALDOL) 1 MG tablet Take 1 tablet (1 mg) by mouth At Bedtime 1/17/2022 at 1917     levothyroxine (SYNTHROID/LEVOTHROID) 88 MCG tablet Take 1 tablet (88 mcg) by mouth daily 1/18/2022 at 0851     megestrol (MEGACE) 20 MG tablet Take 1 tablet (20 mg) by mouth daily 1/18/2022 at 0851     melatonin 3 MG tablet Take 1 tablet (3 mg) by mouth nightly as needed for sleep Unknown at Unknown time     memantine (NAMENDA) 10 MG tablet Take 1 tablet (10 mg) by mouth 2 times daily 1/18/2022 at 0851     metoprolol tartrate (LOPRESSOR) 25 MG tablet Take 1 tablet (25 mg) by mouth 2 times daily 1/18/2022 at 0851     mirtazapine (REMERON) 15 MG tablet Take 1 tablet (15 mg) by mouth At Bedtime 1/17/2022 at 1916     nicotine (NICORETTE) 2 MG gum Place 1 each (2 mg) inside cheek every hour as needed for smoking cessation Unknown at Unknown time     polyethylene glycol (MIRALAX) 17 GM/Dose powder Take 17 g by mouth 2 times daily 1/17/2022 at 2031     polyethylene glycol-propylene glycol PF (SYSTANE ULTRA PF) 0.4-0.3 % SOLN opthalmic solution Place 1 drop into both eyes every hour as needed for dry eyes Unknown at Unknown time     risperiDONE (RISPERDAL M-TABS) 1 MG ODT Place 1 tablet (1 mg) under the tongue every 6 hours as needed (agitation) Unknown at Unknown time     salmeterol (SEREVENT) 50 MCG/DOSE inhaler Inhale 1 puff into the lungs 2 times daily 1/18/2022 at 0855     senna-docusate (SENOKOT-S/PERICOLACE) 8.6-50 MG tablet Take 2 tablets by  mouth nightly as needed for constipation Unknown at Unknown time       Information source(s): Discharge summary and hospital MAR    Summary of Changes to PTA Med List  See notes above    The information provided in this note is only as accurate as the sources available at the time of the update(s).    Thank you for the opportunity to participate in the care of this patient.    Daphne Meeks [facility]  1/18/2022 6:24 PM

## 2022-01-19 NOTE — PHARMACY-CONSULT NOTE
Pharmacy Clozapine Continuation Note    Patient's Name: John Juárez  Patient's : 1965    Current cloZAPine regimen: 200mg daily   Has there been a known interruption in therapy for greater than/equal to 48 hours which would warrant retitration No    Recent ANC Value(s) for last 30 days:  2022: Absolute Neutrophils 2.5 10e3/uL (Ref range: 1.6 - 8.3 10e3/uL)  2022: Absolute Neutrophils 4.7 10e3/uL (Ref range: 1.6 - 8.3 10e3/uL)      A REMS Dispense Authorization was obtained from the clozapine REMS prgram? Yes ([id number])  If no, why was the REMS Dispense Authorization not successful:  A Dispense Rationale was needed to obtain the REMS Dispense Authorization? Yes   Is the ANC (if available) within recommended limits? Yes  Does the patient have any signs or symptoms of infection, including fever? No    Plan:  1. Continue clozapine therapy at 200 mg PO daily .  2. A WBC with differential will be ordered at least weekly, NEXT DUE 2022. ANC values will be entered into the REMS program.    Indira Art, RP,BCCCP, BCCP
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
## 2022-01-19 NOTE — PLAN OF CARE
Problem: Behavioral Health Plan of Care  Goal: Plan of Care Review  Outcome: No Change  Goal: Adheres to Safety Considerations for Self and Others  Outcome: No Change   Pt sleeps comfortably during the night. Q 15 minutes safety checks per unit protocol is ongoing. No sign of pain or discomfort noted or verbalized. There has been no complaint. Nurse call button is within reach. No behavior problem.

## 2022-01-19 NOTE — PLAN OF CARE
Problem: Behavioral Health Plan of Care  Goal: Develops/Participates in Therapeutic [place] to Support Successful Transition  Outcome: No Change  Intervention: Foster Therapeutic [place]  Recent Flowsheet Documentation  Taken 1/18/2022 1800 by Lizzeth Burroughs RN  Trust Relationship/Rapport:    care explained    empathic listening provided    questions answered    thoughts/feelings acknowledged     Problem: Behavioral Health Plan of Care  Goal: Plan of Care Review  Outcome: Improving  Flowsheets (Taken 1/18/2022 1800)  Plan of Care Reviewed With: patient  Patient Agreement with Plan of Care: agrees  Goal: Patient-Specific Goal (Individualization)  Outcome: Improving  Note:     Goal: Adheres to Safety Considerations for Self and Others  Outcome: Improving  Intervention: Develop and Maintain Individualized Safety Plan  Recent Flowsheet Documentation  Taken 1/18/2022 1800 by Lizzeth Burroughs, RN  Safety Measures:    environmental rounds completed    safety rounds completed  Goal: Absence of New-Onset Illness or Injury  Outcome: Improving  Intervention: Identify and Manage Fall Risk  Recent Flowsheet Documentation  Taken 1/18/2022 1800 by Lizzeth Burroughs RN  Safety Measures:    environmental rounds completed    safety rounds completed  Goal: Optimized Coping Skills in Response to Life Stressors  Outcome: Improving     Problem: Suicidal Behavior  Goal: Suicidal Behavior is Absent or Managed  Outcome: Improving     Problem: Urinary Incontinence  Goal: Effective Urinary Elimination  Outcome: Improving   With evening shift, patient remain isolated to room. Comes to the milieu with encouragement only for meals. Disengages with peers and staff. Patient calm, partially cooperative and pleasant on approach but gets irritable during assessment and only answers assessments questions superficially. Denies pain and all psychs symptoms. Contracts for safety. Med complaint. Pt BP noted to be low on SBP on 80,s, fluid encouraged  "but pt refused for BP to be re checked. Patient stated, \"fuck you!\" Continue to encourage fluids.   "
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
## 2022-01-19 NOTE — PLAN OF CARE
Problem: Behavioral Health Plan of Care  Goal: Adheres to Safety Considerations for Self and Others  Outcome: Improving  Intervention: Develop and Maintain Individualized Safety Plan  Recent Flowsheet Documentation  Taken 1/19/2022 1000 by Ella Parson RN  Safety Measures: safety rounds completed  Goal: Absence of New-Onset Illness or Injury  Outcome: Improving  Intervention: Identify and Manage Fall Risk  Recent Flowsheet Documentation  Taken 1/19/2022 1000 by Ella Parson RN  Safety Measures: safety rounds completed    Pt alert and oriented X2. Thought process was forgetful, disoriented. Speech is appropriate and clear. Patient's verbal reports match affect and behavior.    Pain 0/10, anxiety 0/10, Depression 0/10, Denies SI and HI, Denies A/VH. Contracts for safety.   Appetite is poor eating 25% of meals. Bowels are moving normally. VSS. Got order for pt to eat in his room b/c lounge is too noisy.    Pt was observed resting in bed, calm and in room for the majority of the shift. Pt did not participate in groups. Pt was med compliant. Pt sleeps well.     No other behaviors noted this shift.  Pt remains on no precautions for safety.      Uses no ambulatory aids.      Ella Parson RN, CMSRN  [facility]

## 2022-01-20 PROCEDURE — 250N000013 HC RX MED GY IP 250 OP 250 PS 637: Performed by: PSYCHIATRY & NEUROLOGY

## 2022-01-20 PROCEDURE — 250N000013 HC RX MED GY IP 250 OP 250 PS 637

## 2022-01-20 PROCEDURE — 128N000001 HC R&B CD/MH ADULT

## 2022-01-20 PROCEDURE — 99231 SBSQ HOSP IP/OBS SF/LOW 25: CPT | Mod: GT | Performed by: PSYCHIATRY & NEUROLOGY

## 2022-01-20 RX ORDER — MEGESTROL ACETATE 20 MG/1
20 TABLET ORAL DAILY
Status: DISCONTINUED | OUTPATIENT
Start: 2022-01-20 | End: 2022-01-26 | Stop reason: HOSPADM

## 2022-01-20 RX ORDER — LACTULOSE 10 G/15ML
20 SOLUTION ORAL ONCE
Status: COMPLETED | OUTPATIENT
Start: 2022-01-20 | End: 2022-01-20

## 2022-01-20 RX ADMIN — MIRTAZAPINE 15 MG: 7.5 TABLET, FILM COATED ORAL at 20:54

## 2022-01-20 RX ADMIN — GABAPENTIN 100 MG: 100 CAPSULE ORAL at 10:09

## 2022-01-20 RX ADMIN — BUSPIRONE HYDROCHLORIDE 30 MG: 10 TABLET ORAL at 10:09

## 2022-01-20 RX ADMIN — METOPROLOL TARTRATE 25 MG: 25 TABLET, FILM COATED ORAL at 10:10

## 2022-01-20 RX ADMIN — SENNOSIDES AND DOCUSATE SODIUM 1 TABLET: 50; 8.6 TABLET ORAL at 10:10

## 2022-01-20 RX ADMIN — SALMETEROL XINAFOATE 1 PUFF: 50 POWDER, METERED ORAL; RESPIRATORY (INHALATION) at 20:54

## 2022-01-20 RX ADMIN — LACTULOSE 20 G: 20 SOLUTION ORAL at 17:04

## 2022-01-20 RX ADMIN — CLOZAPINE 200 MG: 100 TABLET ORAL at 20:55

## 2022-01-20 RX ADMIN — MEMANTINE HYDROCHLORIDE 10 MG: 10 TABLET, FILM COATED ORAL at 20:54

## 2022-01-20 RX ADMIN — MEGESTROL ACETATE 20 MG: 20 TABLET ORAL at 10:11

## 2022-01-20 RX ADMIN — BUSPIRONE HYDROCHLORIDE 30 MG: 10 TABLET ORAL at 20:55

## 2022-01-20 RX ADMIN — GABAPENTIN 100 MG: 100 CAPSULE ORAL at 12:47

## 2022-01-20 RX ADMIN — GABAPENTIN 100 MG: 100 CAPSULE ORAL at 17:04

## 2022-01-20 RX ADMIN — ASPIRIN 81 MG: 81 TABLET, COATED ORAL at 10:10

## 2022-01-20 RX ADMIN — HALOPERIDOL 1 MG: 1 TABLET ORAL at 20:55

## 2022-01-20 RX ADMIN — LEVOTHYROXINE SODIUM 88 MCG: 0.09 TABLET ORAL at 10:10

## 2022-01-20 RX ADMIN — SALMETEROL XINAFOATE 1 PUFF: 50 POWDER, METERED ORAL; RESPIRATORY (INHALATION) at 10:11

## 2022-01-20 RX ADMIN — DONEPEZIL HYDROCHLORIDE 10 MG: 5 TABLET, FILM COATED ORAL at 20:55

## 2022-01-20 RX ADMIN — SENNOSIDES AND DOCUSATE SODIUM 1 TABLET: 50; 8.6 TABLET ORAL at 20:55

## 2022-01-20 RX ADMIN — ATORVASTATIN CALCIUM 80 MG: 40 TABLET, FILM COATED ORAL at 20:55

## 2022-01-20 RX ADMIN — MEMANTINE HYDROCHLORIDE 10 MG: 10 TABLET, FILM COATED ORAL at 10:11

## 2022-01-20 RX ADMIN — POLYETHYLENE GLYCOL 3350 17 G: 17 POWDER, FOR SOLUTION ORAL at 10:11

## 2022-01-20 ASSESSMENT — ACTIVITIES OF DAILY LIVING (ADL)
LAUNDRY: UNABLE TO COMPLETE
DRESS: PROMPTS
ORAL_HYGIENE: PROMPTS
DRESS: SCRUBS (BEHAVIORAL HEALTH)
HYGIENE/GROOMING: PROMPTS
DRESS: PROMPTS
HYGIENE/GROOMING: HANDWASHING;INDEPENDENT
ORAL_HYGIENE: PROMPTS
HYGIENE/GROOMING: PROMPTS

## 2022-01-20 NOTE — PLAN OF CARE
Problem: Behavioral Health Plan of Care  Goal: Optimized Coping Skills in Response to Life Stressors  Outcome: No Change  Goal: Develops/Participates in Therapeutic [place] to Support Successful Transition  Outcome: No Change  Intervention: Foster Therapeutic [place]  Recent Flowsheet Documentation  Taken 1/19/2022 2045 by Lizzeth Burroughs RN  Trust Relationship/Rapport:    care explained    questions answered    thoughts/feelings acknowledged    reassurance provided     Problem: Behavioral Health Plan of Care  Goal: Plan of Care Review  Outcome: Improving  Flowsheets (Taken 1/19/2022 2045)  Plan of Care Reviewed With: patient  Patient Agreement with Plan of Care: refuses to participate  Goal: Patient-Specific Goal (Individualization)  Outcome: Improving  Note:     Goal: Adheres to Safety Considerations for Self and Others  Outcome: Improving  Intervention: Develop and Maintain Individualized Safety Plan  Recent Flowsheet Documentation  Taken 1/19/2022 2045 by Lizzeth Burroughs, RN  Safety Measures:    safety rounds completed    environmental rounds completed  Goal: Absence of New-Onset Illness or Injury  Outcome: Improving  Intervention: Identify and Manage Fall Risk  Recent Flowsheet Documentation  Taken 1/19/2022 2045 by Lizzeth Burroughs RN  Safety Measures:    safety rounds completed    environmental rounds completed     Problem: Suicidal Behavior  Goal: Suicidal Behavior is Absent or Managed  Outcome: Improving     Problem: Urinary Incontinence  Goal: Effective Urinary Elimination  Outcome: Change based on patient need/priority    Patient remain isolated to room the entire shift.  Felt dizzy upon standing, was given order  to eat in room, had to feed the patient, ate 50% dinner and snacks. Continue to encourage fluids. Disengaged with peers and staff. Patient calm, partially cooperative and pleasant on approach but gets irritable during assessment and only answers assessments questions superficially. Denies  pain and all psychs symptoms. Contracts for safety. Med complaint. Patient will benefit from speech therapy and PT due to difficulty swallowing and unsteady gait. Metoprolol was held toning due to BP parameters not met. BP slightly low.

## 2022-01-20 NOTE — PLAN OF CARE
Problem: Behavioral Health Plan of Care  Goal: Optimized Coping Skills in Response to Life Stressors  Outcome: No Change  Goal: Develops/Participates in Therapeutic [place] to Support Successful Transition  Outcome: No Change  Intervention: Foster Therapeutic [place]  Recent Flowsheet Documentation  Taken 1/19/2022 2045 by Lizzeth Burroughs, RN  Trust Relationship/Rapport:    care explained    questions answered    thoughts/feelings acknowledged    reassurance provided   Patient slept throughout the night without interruption. Slept more than 6 hours. No PRN required. Contracted for safety.

## 2022-01-20 NOTE — PROGRESS NOTES
Speech Therapy    Order(s) received for bedside swallow study.  Unable to address order today.  If concerned about choking/aspiration, consider downgrading diet textures and/or liquids until patient can be evaluated by Speech Therapy on 1/21/22.

## 2022-01-20 NOTE — PROGRESS NOTES
"PSYCHIATRY  PROGRESS NOTE     DATE OF SERVICE   01/20/2022    The patient is a 56 year old male who is being evaluated via a video billable telemedicine visit. The patient/guardian has consented to being seen via telemedicine. The provider was in front of a computer in a home office. The patient was on the inpatient unit at [facility].     Start time: 12:07 PM  Stop time: 12:14 PM    The patient/guardian has been notified of the following:     This telemedicine visit is conducted live between you and your clinician. We have found that certain health care needs can be provided without the need for a physical exam. This service lets us provide the care you need with a telemedicine conversation.           CHIEF COMPLAINT   \" I want to go home.\"       SUBJECTIVE   Nursing reports:Patient remain isolated to room the entire shift.  Felt dizzy upon standing, was given order  to eat in room, had to feed the patient, ate 50% dinner and snacks. Continue to encourage fluids. Disengaged with peers and staff. Patient calm, partially cooperative and pleasant on approach but gets irritable during assessment and only answers assessments questions superficially. Denies pain and all psychs symptoms. Contracts for safety. Med complaint. Patient will benefit from speech therapy and PT due to difficulty swallowing and unsteady gait. Metoprolol was held toning due to BP parameters not met. BP slightly low.     Patient has been discussed with the .  Assessment/Intervention/Current Symtoms and Care Coordination: Writer met with pt on this date and consulted with psychiatrist. Psychiatrist and writer met with pt via the [employer] system ([employer]). Pt presents as confused, apathetic, and only oriented to self. Pt was dismissive and guarded. Pt asked when he could discharge home. Pt states that he owns 2 mansions in [place], [place]. Pt has a hx of delusions based in grandiosity.      Writer called and spoke to pt's " mental health/civil commitment : Aneesh @ [facility]. In regards to discharge planning, Aneesh states that he is in communication with Rachel from [facility] and she is presenting pt's case to board of [employer] program to determine if they would petition for guardianship.        OBJECTIVE   Patient was seen and evaluated at bedside with  present during the assessment, this was done with the use of telehealth.  During the assessment the patient presented as guarded, extremely confused and unable to provide accurate information.  He was answering questions with only 1 word answers and when he was able to formulate full sentences he did not make sense.  Patient asked multiple times when he will be able to go home.  I discussed with the patient that currently he is under a commitment and his  is working on a safe discharge plan.  After I said that patient started starring at the IPad without answering the questions.  Then he said that he has 2 Mansions in [facility] in [place].  He was not able to continue with the assessment as he is extremely confused.     MEDICATIONS   Medications:  Scheduled Meds:    aspirin  81 mg Oral Daily     atorvastatin  80 mg Oral At Bedtime     busPIRone HCl  30 mg Oral BID     cloZAPine  200 mg Oral At Bedtime     donepezil  10 mg Oral At Bedtime     enoxaparin ANTICOAGULANT  40 mg Subcutaneous Q24H     gabapentin  100 mg Oral TID w/meals     haloperidol  1 mg Oral At Bedtime     influenza recomb quadrivalent PF  0.5 mL Intramuscular Prior to discharge     levothyroxine  88 mcg Oral Daily     megestrol  20 mg Oral Daily     memantine  10 mg Oral BID     metoprolol tartrate  25 mg Oral BID     mirtazapine  15 mg Oral At Bedtime     polyethylene glycol  17 g Oral Daily     salmeterol  1 puff Inhalation BID     senna-docusate  1 tablet Oral BID     Continuous Infusions:  PRN Meds:.albuterol, bisacodyl, risperiDONE    Medication adherence issues: MS Med Adherence Y/N: Yes,  "Hospitalization  Medication side effects: MEDICATION SIDE EFFECTS: no side effects reported  Benefit: Yes / No: Yes       ROS   A comprehensive review of systems was negative.       MENTAL STATUS EXAM   Vitals: /68   Pulse 80   Temp 98  F (36.7  C) (Oral)   Resp 17   Ht 1.854 m (6' 1\")   Wt 72.3 kg (159 lb 4.8 oz)   SpO2 99%   BMI 21.02 kg/m      Appearance:  No apparent distress  Mood:  {Mood: \"I want to go home\"  Affect: blunted  was congruent to speech  Suicidal Ideation: Unable to assess  Homicidal Ideation: Unable to assess  Thought process: loose associations   Thought content: significant for delusions [details in Interim History].   Fund of Knowledge: Below average  Attention/Concentration: Easily distracted  Language ability: Limited, speech is slurred at times  Memory: Global memory impairment  Insight:  Poor.  Judgement: Poor  Orientation: Only to person  Psychomotor Behavior: slowed    Muscle Strength and Tone: MuscleStrength: Normal and Atrophy  Gait and Station: Not evaluated       LABS   personally reviewed.     No results found for: PHENYTOIN, PHENOBARB, VALPROATE, CBMZ       DIAGNOSIS   Principal Problem:    Chronic schizophrenia (H)    Active Problem List:  Patient Active Problem List   Diagnosis     TBI with aggressive behavior     HTN (hypertension)     COPD (chronic obstructive pulmonary disease) (H)     Dementia with behavioral disturbance (H)     Chronic schizophrenia (H)     Smoker     Agitation     Behavior disturbance     Psychosis (H)          PLAN   1. Ongoing education given regarding diagnostic and treatment options with risks, benefits and alternatives and adequate verbalization of understanding.  2.  Medications       BuSpar 30 mg 2 times daily       Clozaril 200 mg at bedtime       Aricept 10 mg at bedtime       Gabapentin 100 mg 3 times a day       Haldol 1 mg at bedtime       Namenda 10 mg 2 times a day       Risperdal 15 mg at bedtime  3.  Medical team following the " patient  4.   creating a safe discharge plan with the patient.    Risk Assessment: [facility] RISK ASSESSMENT: Patient able to contract for safety    Coordination of Care:   Treatment Plan reviewed and physician signed, Care discussed with Care/Treatment Team Members, Chart reviewed and Patient seen      Re-Certification I certify that the inpatient psychiatric facility services furnished since the previous certification were, and continue to be, medically necessary for, either, treatment which could reasonably be expected to improve the patient s condition or diagnostic study and that the hospital records indicate that the services furnished were, either, intensive treatment services, admission and related services necessary for diagnostic study, or equivalent services.     I certify that the patient continues to need, on a daily basis, active treatment furnished directly by or requiring the supervision of inpatient psychiatric facility personnel.   I estimate 14 days of hospitalization is necessary for proper treatment of the patient. My plans for post-hospital care for this patient are  TBD     Ginger Dubon MD    -     01/20/2022  -     12:05 PM    Total time  15 minutes with > 50%spent on coordination of cares and psycho-education.    This note was created with help of Dragon dictation system. Grammatical / typing errors are not intentional.    Ginger Dubon MD

## 2022-01-20 NOTE — PROGRESS NOTES
"[facility]    Medicine Progress Note - Hospitalist Service    Date of Admission:  1/18/2022    Assessment & Plan        John Juárez is a 56 year old male admitted on 1/18/2022. He was admitted on 1/13/22 to [facility]. He has a PMH of COPD, cardiomyopathy, endocarditis s/p bioprosthetic valves, CVA, hypothyroidism, TBI and schizophrenia. He is under commitment and tested positive for COVID 1/13. Pt is confused and only oriented to self. Has not had a bowel movement for 5 days. Refused labs this morning, states he would let lab draw now. Says he is \"being starved to death\", nursing staff reports pt had a late breakfast and ate 50% of breakfast today and dinner last night.        # Confirmed COVID-19 infection          Symptom Onset unknown   Date of 1st Positive Test 1/13/2022   Vaccination Status Fully Vaccinated, no booster          - COVID-19 special precautions, continuous pulse-ox  - Oxygen: continue current support with room air; titrate to keep SpO2 between 90-96%  - Labs: CBC, BMP, CRP in am  - Imaging: no additional imaging needed at this time  - Breathing treatments: inhalers ordered for COPD and albuterol prn ; avoid nebulizers in favor of MDIs   - IV fluids: not indicated at this time  - Antibiotics: not indicated   - COVID-Focused Medications: No COVID-specific therapies are appropriate at this time.  - DVT Prophylaxis: at high risk of thrombotic complications due to COVID-19 (DDimer = 0.89 ug/mL FEU (Ref range: 0.00 - 0.50 ug/mL FEU) ).          - PROPHYLACTIC dosing: lovenox 40mg daily- dc'd       - encourage ambulation, PT consult for unsteady gait and potential need for walker    #Constipation  - Has not had a bowel movement for 5 days  -scheduled senna and miralax, pt refused prn suppository  - one time dose of lactulose 20 mg  - continue to monitor       #COPD  - Lungs clear with good air movement, no wheezing  - Continue PTA salmeterol and albuterol " PRN     #Hypertension  - Has been hypotensive since admission  - resumed metoprolol with parameters, if continues to trend low will reduce dose.   - metoprolol was held last night and given this morning for normotensive blood pressure      #Hypothyroidism  - TSH 2.71 on 11/4/21  - Continue PTA levothyroxine 88 mcg daily     #CVA  - Basal ganglia stroke 2015  -Continue PTA aspirin 81 mg and atorvastatin 80 mg daily  -received a pureed diet for lunch, consult Speech therapy to buzz rodriguez      #Schizophrenia, neurocognitive disorder secondary to TBI  - manage per primary care team                Diet: Regular Diet Adult    DVT Prophylaxis: Low Risk/Ambulatory with no VTE prophylaxis indicated  Valentin Catheter: Not present  Central Lines: None  Cardiac Monitoring: None  Code Status: Full Code      Disposition Plan   Expected Discharge:TBD     The patient's care was discussed with the Bedside Nurse.    Lynda Cantu NP  Hospitalist Service  [facility]  Securely message with the Vocera Web Console (learn more here)  Text page via [employer] Paging/Directory         ______________________________________________________________________    Interval History   Slept through the night. Manage constipation. Pt asked for the influenza vaccine and then refused, left on MAR.    Data reviewed today: I reviewed all medications, new labs and imaging results over the last 24 hours.     Physical Exam   Vital Signs: Temp: 98  F (36.7  C) Temp src: Oral       Resp: 16 SpO2: 99 % O2 Device: None (Room air)    Weight: 159 lbs 4.8 oz  Constitutional: fatigued  Respiratory: No increased work of breathing, good air exchange, clear to auscultation bilaterally, no crackles or wheezing  Cardiovascular: Normal apical impulse, regular rate and rhythm, normal S1 and S2, no S3 or S4, and no murmur noted  Musculoskeletal: There is no redness, warmth, or swelling of the joints.  Full range of motion noted.  Motor strength is 5  out of 5 all extremities bilaterally.  Tone is normal. Unsteady  Neurologic: Awake, fatigued, oriented to name.  Disoriented to place and time. Cranial nerves II-XII are grossly intact.  Motor is 5 out of 5 bilaterally.

## 2022-01-20 NOTE — PLAN OF CARE
"  Assessment/Intervention/Current Symtoms and Care Coordination: Writer met with pt on this date and consulted with psychiatrist. Psychiatrist and writer met with pt via the [employer] system (telehealth). Pt presents as confused, apathetic, and only oriented to self. Pt was dismissive and guarded. Pt asked when he could discharge home. Pt states that he owns 2 mansions in [place], [place]. Pt has a hx of delusions based in grandiosity.     Writer called and spoke to pt's mental health/civil commitment : Aneesh @ [facility]. In regards to discharge planning, Aneesh states that he is in communication with Rachel from [facility] and she is presenting pt's case to board of [employer] program to determine if they would petition for guardianship.     Discharge Plan or Goal: Pending stabilization & development of a safe discharge plan.  Considerations include: Nursing home. Guardianship process is being pursued. Awaiting the outcome of new DHS \"Going Home Program\". Have also communicated to the  regarding the [employer] Program which is being engaged at the DHS level at this point.       Barriers to Discharge: Patient requires further psychiatric stabilization due to current symptomology, Medication management with possible adjustments and guardianship      Referral Status: TBD      Legal Status: MI commitment ([facility]) with Beau. [id number]    Important patient contacts:  Contacts:  Case Management Services:  [facility]   Kurtis Chapin. Phone: [phone]  Supervisor is [email or web address]     Freida Machuca  Sister  [phone]         JORDY Patel [facility], 1/20/2022, 12:50 PM     "

## 2022-01-20 NOTE — PLAN OF CARE
Problem: Behavioral Health Plan of Care  Goal: Adheres to Safety Considerations for Self and Others  Outcome: Improving  Intervention: Develop and Maintain Individualized Safety Plan  Recent Flowsheet Documentation  Taken 1/20/2022 1100 by Ella Parson RN  Safety Measures: safety rounds completed  Goal: Absence of New-Onset Illness or Injury  Outcome: Improving  Intervention: Identify and Manage Fall Risk  Recent Flowsheet Documentation  Taken 1/20/2022 1100 by Ella Parson RN  Safety Measures: safety rounds completed    Pt alert and oriented X3. Thought process was linear and organized., but perseverate on leaving.  Speech is appropriate and clear. Patient's verbal reports match affect and behavior.    Pain 0/10, anxiety 0/10, Depression 0/10, Denies SI and HI, Denies A/VH. Contracts for safety.   Appetite is poor eating 25% of meals. Bowels are moving normally. VSS.    Pt was observed sleeping and in own room for the majority of the shift. Pt did not participate in groups. Pt was med compliant. Pt sleeps well.    No other behaviors noted this shift.  Pt remains on cheeking precautions for safety.   No BM, refused suppository, NP notifed.      Uses no ambulatory aids.      Ella Parson RN, CMSRN  Mental Health [facility]

## 2022-01-20 NOTE — PROGRESS NOTES
01/20/22 0951   Engagement   Intervention Group   Topic Wellness strategies;Symptom management   Topic Detail Journaling education and practice   Attendance Did not attend   Reason for Not Attending Refused

## 2022-01-21 LAB
ALBUMIN SERPL-MCNC: 3 G/DL (ref 3.5–5)
ALP SERPL-CCNC: 61 U/L (ref 45–120)
ALT SERPL W P-5'-P-CCNC: 15 U/L (ref 0–45)
ANION GAP SERPL CALCULATED.3IONS-SCNC: 11 MMOL/L (ref 5–18)
AST SERPL W P-5'-P-CCNC: 32 U/L (ref 0–40)
BASOPHILS # BLD MANUAL: 0 10E3/UL (ref 0–0.2)
BASOPHILS NFR BLD MANUAL: 0 %
BILIRUB SERPL-MCNC: 0.4 MG/DL (ref 0–1)
BUN SERPL-MCNC: 13 MG/DL (ref 8–22)
C REACTIVE PROTEIN LHE: 0.7 MG/DL (ref 0–0.8)
CALCIUM SERPL-MCNC: 8.8 MG/DL (ref 8.5–10.5)
CHLORIDE BLD-SCNC: 114 MMOL/L (ref 98–107)
CO2 SERPL-SCNC: 18 MMOL/L (ref 22–31)
CREAT SERPL-MCNC: 0.68 MG/DL (ref 0.7–1.3)
EOSINOPHIL # BLD MANUAL: 0.5 10E3/UL (ref 0–0.7)
EOSINOPHIL NFR BLD MANUAL: 12 %
ERYTHROCYTE [DISTWIDTH] IN BLOOD BY AUTOMATED COUNT: 17.3 % (ref 10–15)
GFR SERPL CREATININE-BSD FRML MDRD: >90 ML/MIN/1.73M2
GLUCOSE BLD-MCNC: 116 MG/DL (ref 70–125)
GLUCOSE BLDC GLUCOMTR-MCNC: 103 MG/DL (ref 70–99)
GLUCOSE BLDC GLUCOMTR-MCNC: 131 MG/DL (ref 70–99)
HCT VFR BLD AUTO: 37.1 % (ref 40–53)
HGB BLD-MCNC: 12.8 G/DL (ref 13.3–17.7)
LYMPHOCYTES # BLD MANUAL: 1 10E3/UL (ref 0.8–5.3)
LYMPHOCYTES NFR BLD MANUAL: 23 %
MCH RBC QN AUTO: 32.5 PG (ref 26.5–33)
MCHC RBC AUTO-ENTMCNC: 34.5 G/DL (ref 31.5–36.5)
MCV RBC AUTO: 94 FL (ref 78–100)
MONOCYTES # BLD MANUAL: 0.4 10E3/UL (ref 0–1.3)
MONOCYTES NFR BLD MANUAL: 10 %
NEUTROPHILS # BLD MANUAL: 2.4 10E3/UL (ref 1.6–8.3)
NEUTROPHILS NFR BLD MANUAL: 55 %
PLAT MORPH BLD: ABNORMAL
PLATELET # BLD AUTO: 174 10E3/UL (ref 150–450)
POTASSIUM BLD-SCNC: 3.6 MMOL/L (ref 3.5–5)
PROT SERPL-MCNC: 6.8 G/DL (ref 6–8)
RBC # BLD AUTO: 3.94 10E6/UL (ref 4.4–5.9)
RBC MORPH BLD: ABNORMAL
SODIUM SERPL-SCNC: 143 MMOL/L (ref 136–145)
WBC # BLD AUTO: 4.3 10E3/UL (ref 4–11)

## 2022-01-21 PROCEDURE — 128N000001 HC R&B CD/MH ADULT

## 2022-01-21 PROCEDURE — 999N000157 HC STATISTIC RCP TIME EA 10 MIN

## 2022-01-21 PROCEDURE — 258N000003 HC RX IP 258 OP 636: Performed by: HOSPITALIST

## 2022-01-21 PROCEDURE — 250N000013 HC RX MED GY IP 250 OP 250 PS 637: Performed by: PSYCHIATRY & NEUROLOGY

## 2022-01-21 PROCEDURE — 86140 C-REACTIVE PROTEIN: CPT

## 2022-01-21 PROCEDURE — 85027 COMPLETE CBC AUTOMATED: CPT

## 2022-01-21 PROCEDURE — 250N000013 HC RX MED GY IP 250 OP 250 PS 637

## 2022-01-21 PROCEDURE — 36415 COLL VENOUS BLD VENIPUNCTURE: CPT

## 2022-01-21 PROCEDURE — 99232 SBSQ HOSP IP/OBS MODERATE 35: CPT | Performed by: PSYCHIATRY & NEUROLOGY

## 2022-01-21 PROCEDURE — 80053 COMPREHEN METABOLIC PANEL: CPT

## 2022-01-21 RX ORDER — SODIUM CHLORIDE 9 MG/ML
INJECTION, SOLUTION INTRAVENOUS CONTINUOUS
Status: DISCONTINUED | OUTPATIENT
Start: 2022-01-21 | End: 2022-01-22

## 2022-01-21 RX ADMIN — HALOPERIDOL 1 MG: 1 TABLET ORAL at 20:28

## 2022-01-21 RX ADMIN — POLYETHYLENE GLYCOL 3350 17 G: 17 POWDER, FOR SOLUTION ORAL at 08:56

## 2022-01-21 RX ADMIN — MIRTAZAPINE 15 MG: 7.5 TABLET, FILM COATED ORAL at 20:28

## 2022-01-21 RX ADMIN — DONEPEZIL HYDROCHLORIDE 10 MG: 5 TABLET, FILM COATED ORAL at 20:28

## 2022-01-21 RX ADMIN — SENNOSIDES AND DOCUSATE SODIUM 1 TABLET: 50; 8.6 TABLET ORAL at 20:28

## 2022-01-21 RX ADMIN — BUSPIRONE HYDROCHLORIDE 30 MG: 10 TABLET ORAL at 20:28

## 2022-01-21 RX ADMIN — GABAPENTIN 100 MG: 100 CAPSULE ORAL at 16:56

## 2022-01-21 RX ADMIN — LEVOTHYROXINE SODIUM 88 MCG: 0.09 TABLET ORAL at 08:55

## 2022-01-21 RX ADMIN — SODIUM CHLORIDE: 9 INJECTION, SOLUTION INTRAVENOUS at 20:47

## 2022-01-21 RX ADMIN — ATORVASTATIN CALCIUM 80 MG: 40 TABLET, FILM COATED ORAL at 20:28

## 2022-01-21 RX ADMIN — SALMETEROL XINAFOATE 1 PUFF: 50 POWDER, METERED ORAL; RESPIRATORY (INHALATION) at 09:03

## 2022-01-21 RX ADMIN — SALMETEROL XINAFOATE 1 PUFF: 50 POWDER, METERED ORAL; RESPIRATORY (INHALATION) at 20:28

## 2022-01-21 RX ADMIN — MEGESTROL ACETATE 20 MG: 20 TABLET ORAL at 08:55

## 2022-01-21 RX ADMIN — SODIUM CHLORIDE: 9 INJECTION, SOLUTION INTRAVENOUS at 20:26

## 2022-01-21 RX ADMIN — MEMANTINE HYDROCHLORIDE 10 MG: 10 TABLET, FILM COATED ORAL at 20:28

## 2022-01-21 RX ADMIN — GABAPENTIN 100 MG: 100 CAPSULE ORAL at 12:30

## 2022-01-21 RX ADMIN — ASPIRIN 81 MG: 81 TABLET, COATED ORAL at 08:55

## 2022-01-21 RX ADMIN — BUSPIRONE HYDROCHLORIDE 30 MG: 10 TABLET ORAL at 08:55

## 2022-01-21 RX ADMIN — CLOZAPINE 200 MG: 100 TABLET ORAL at 20:28

## 2022-01-21 RX ADMIN — MEMANTINE HYDROCHLORIDE 10 MG: 10 TABLET, FILM COATED ORAL at 08:55

## 2022-01-21 RX ADMIN — GABAPENTIN 100 MG: 100 CAPSULE ORAL at 08:55

## 2022-01-21 RX ADMIN — SENNOSIDES AND DOCUSATE SODIUM 1 TABLET: 50; 8.6 TABLET ORAL at 08:55

## 2022-01-21 ASSESSMENT — ACTIVITIES OF DAILY LIVING (ADL)
DRESS: PROMPTS
ORAL_HYGIENE: PROMPTS
HYGIENE/GROOMING: HANDWASHING;PROMPTS

## 2022-01-21 NOTE — PLAN OF CARE
BEHAVIORAL TEAM DISCUSSION    Participants:     -Dr. Dk SANCHEZ  -Azael Schilling RN  -Nan FARNSWORTH OT  -PharmD    Progress: Pt remains the same in regards of status   Anticipated length of stay: TBD  Continued Stay Criteria/Rationale: lack of legal decision maker   Medical/Physical: confirmed covid-19 positive, COPD, hypertension, hypothyroidism, CVA, TBI    Precautions:   Behavioral Orders   Procedures     Cheeking Precautions (behavioral units)     Code 1 - Restrict to Unit     Routine Programming     As clinically indicated     Status 15     Every 15 minutes.     Plan: TBD- exploring guardianship options   Rationale for change in precautions or plan: TBD

## 2022-01-21 NOTE — SIGNIFICANT EVENT
Significant Event Note    Time of event: 10.45 AM    Description of event:  55 y/o male with multiple medical issues admitted to in-pt psy for mental health disorder. Per RN, pt was unresponsive and Rapid response called. On my arrival, pt awake, alert, responding to my questions although speech hard to understand. Per RN, this is his baseline speech. Skipped his breakfast this AM as states he wasn't hungry and has poor appetite overall.     Exam:  General: NAD  HEENT:EOMI, AT,NC  CVS:RRR, no edema  RS:CTAB  Abd: Soft, NT,ND  Neurology:Awake, alert, moving all extremities, speech+ but slow to respond  Psy:Flat affect      Plan:  CBC, CMP ordered  IVF with NS @ 100 ml/hour  RT to assist feed with each meal  Hold metoprolol as BP normotensive.    Discussed with: bedside nurse    Rhianna Ruiz MD

## 2022-01-21 NOTE — PLAN OF CARE
"  Assessment/Intervention/Current Symtoms and Care Coordination: Writer met with pt on this date via the [email or web address] system (telehealth) due to positive covid-19 status. Pt was non-responsive, not responding to verbal cues. Pt's eyes were open and appeared to be sleeping. Psychiatrist went to see pt physically on the unit. Rapid response was called. Pt received medical attention. Please refer to psychiatrist and Medicine Service notes.     There is no update to discharge planning at this time.       Discharge Plan or Goal: Pending stabilization & development of a safe discharge plan.  Considerations include: Nursing home. Guardianship process is being pursued. Awaiting the outcome of new DHS \"Going Home Program\". Have also communicated to the  regarding the [employer] Program which is being engaged at the DHS level at this point.         Barriers to Discharge: Patient requires further psychiatric stabilization due to current symptomology, Medication management with possible adjustments and guardianship        Referral Status: TBD        Legal Status: MI commitment ([facility]) with Beau. [id number]     Important patient contacts:  Contacts:  Case Management Services:  [facility]   Kurtis Cahpin. Phone: [phone]  Supervisor is [email or web address]     Freida Machuca  Sister  [phone]       JORDY Patel [facility], 1/21/2022, 1:12 PM   "

## 2022-01-21 NOTE — PLAN OF CARE
Problem: Behavioral Health Plan of Care  Goal: Plan of Care Review  Outcome: Improving  Flowsheets (Taken 1/20/2022 1700)  Plan of Care Reviewed With: patient  Patient Agreement with Plan of Care: agrees     Problem: Suicidal Behavior  Goal: Suicidal Behavior is Absent or Managed  Outcome: Improving   Pt appears to be sleeping comfortably during all safety checks. No signs of pain or discomfort noted or verbalized.

## 2022-01-21 NOTE — PROGRESS NOTES
01/21/22 6028   Engagement   Intervention Group   Topic Detail Virtual OT: Education on physical wellness with interactive social physical wellness activity (Movement bingo) to increase concentration, attention to task, focus, task follow through, coping with stress, social wellness, physical wellness, and overall wellness   Attendance Did not attend   Reason for Not Attending Refused     Did not attend after face to face invite from unit staff.

## 2022-01-21 NOTE — SIGNIFICANT EVENT
Responded to RRT, NSL was placed and IV fluid started per order.  Lab specimens sent to lab.  Patient currently responsive though mildly drowsy.  Eufemia Morris RN

## 2022-01-21 NOTE — PROGRESS NOTES
[facility]    Medicine Progress Note - Hospitalist Service    Date of Admission:  1/18/2022    Assessment & Plan        John Juárez is a 56 year old male admitted on 1/18/2022. He was admitted on 1/13/22 to [facility]. He has a PMH of COPD, cardiomyopathy, endocarditis s/p bioprosthetic valves, CVA, hypothyroidism, TBI and schizophrenia. He is under commitment and tested positive for COVID 1/13. Pt is confused and only oriented to self. Has not had a bowel movement for 6 days per patient chart, when asked by staff he said he had one 3 days ago. Rapid response was called for unresponsiveness, see Dr Ruiz note 1/21.      # Confirmed COVID-19 infection          Symptom Onset unknown   Date of 1st Positive Test 1/13/2022   Vaccination Status Fully Vaccinated, no booster          - COVID-19 special precautions, spot check pulse ox  - Oxygen: continue current support with room air; titrate to keep SpO2 between 90-96%  - Labs: CBC, BMP  - Imaging: no additional imaging needed at this time  - Breathing treatments: inhalers ordered for COPD and albuterol prn ; avoid nebulizers in favor of MDIs   - IV fluids: not indicated at this time  - Antibiotics: not indicated   - COVID-Focused Medications: No COVID-specific therapies are appropriate at this time.  - DVT Prophylaxis:        - encourage ambulation, PT consult for unsteady gait and potential need for walker. PT still needs to see.    #Constipation  - Has not had a bowel movement for 6 days (pt states it was 3 days ago)  -scheduled senna and miralax, pt refused prn suppository  - one time dose of lactulose 20 mg given yesterday    #risk for malnourishment  - encouraged patient to eat more  - order placed for staff to monitor meals and help patient eat  - ordered ensure with meals    #COPD  - Lungs clear with good air movement, no wheezing  - Continue PTA salmeterol and albuterol PRN     #Hypertension  - Has been hypotensive since admission  -  resumed metoprolol with parameters, now on hold, if is restarted will start at a small dose  - metoprolol put on hold after rapid response  - Getting IVF and blood pressures have improved   - CBC and CMP stable, no significant change    #Hypothyroidism  - TSH 2.71 on 11/4/21  - Continue PTA levothyroxine 88 mcg daily     #CVA  - Basal ganglia stroke 2015  -Continue PTA aspirin 81 mg and atorvastatin 80 mg daily  -received a pureed diet for lunch, consult Speech therapy to eval swallow      #Schizophrenia, neurocognitive disorder secondary to TBI  - manage per primary care team                Diet: Regular Diet Adult    DVT Prophylaxis: Low Risk/Ambulatory with no VTE prophylaxis indicated  Valentin Catheter: Not present  Central Lines: None  Cardiac Monitoring: None  Code Status: Full Code      Disposition Plan   Expected Discharge:TBD     The patient's care was discussed with the Bedside Nurse.    Lynda Cantu NP  Hospitalist Service  [facility]  Securely message with the Vocera Web Console (learn more here)  Text page via [employer] Paging/Directory         ______________________________________________________________________    Interval History   Slept through the night. Manage constipation. Pt asked for the influenza vaccine and then refused, left on MAR.    Data reviewed today: I reviewed all medications, new labs and imaging results over the last 24 hours.     Physical Exam   Vital Signs: Temp: 97.2  F (36.2  C) Temp src: Oral BP: 106/77 Pulse: 84   Resp: 17 SpO2: 98 %      Weight: 159 lbs 4.8 oz  Constitutional: fatigued  Respiratory: No increased work of breathing, good air exchange, clear to auscultation bilaterally, no crackles or wheezing  Cardiovascular: Normal apical impulse, regular rate and rhythm, normal S1 and S2, no S3 or S4, and no murmur noted  Musculoskeletal: There is no redness, warmth, or swelling of the joints.  Full range of motion noted.  Motor strength is 5 out of 5  all extremities bilaterally.  Tone is normal. Unsteady  Neurologic: Awake, fatigued, oriented to name.  Disoriented to place and time. Cranial nerves II-XII are grossly intact.  Motor is 5 out of 5 bilaterally.

## 2022-01-21 NOTE — PLAN OF CARE
"  Problem: Behavioral Health Plan of Care  Goal: Develops/Participates in Therapeutic [place] to Support Successful Transition  Outcome: No Change  Intervention: Foster Therapeutic [place]  Recent Flowsheet Documentation  Taken 1/21/2022 0900 by Lizzeth Burroughs RN  Trust Relationship/Rapport:    care explained    empathic listening provided    questions answered    reassurance provided    thoughts/feelings acknowledged     Problem: Behavioral Health Plan of Care  Goal: Plan of Care Review  Outcome: Improving  Flowsheets (Taken 1/21/2022 0900)  Plan of Care Reviewed With: patient  Patient Agreement with Plan of Care: (\"when can I go home?\") agrees with comment (describe)  Goal: Adheres to Safety Considerations for Self and Others  Outcome: Improving  Intervention: Develop and Maintain Individualized Safety Plan  Recent Flowsheet Documentation  Taken 1/21/2022 0900 by Lizzeth Burroughs, RN  Safety Measures:    environmental rounds completed    safety rounds completed  Goal: Absence of New-Onset Illness or Injury  Outcome: Improving  Intervention: Identify and Manage Fall Risk  Recent Flowsheet Documentation  Taken 1/21/2022 0900 by Lizzeth Burroughs RN  Safety Measures:    environmental rounds completed    safety rounds completed  Goal: Optimized Coping Skills in Response to Life Stressors  Outcome: Improving     Problem: Suicidal Behavior  Goal: Suicidal Behavior is Absent or Managed  Outcome: Improving     Problem: Behavioral Health Plan of Care  Goal: Patient-Specific Goal (Individualization)  Note:   Patient Alert and able to make needs known in the beginning of the day shift. Calm, cooperative and med complaint. Systolic blood pressure was out of parameters. BP med held. Continue to encourage fluids. During initial assessments, Pt denies all psych symptoms and contracts for safety. He continues to be isolated to room, did not eat breakfast. Patient drank some ensure and two cups of orange juice and one serving of " "applesauce. Patient continue to rest in bed. Couple of hours later during tele health,  noticed patient not responsive to his baseline, eyes open and patient was breathing. Rapid response called for more evaluation. Patient was placed on IV infusion and VS checks every 30 minutes. VSS remains stable. Patient on 1:1. Ate lunch and appears to be more alert.  He remained pleasant with a flat and blunted affect. Stated, \"I pooped three days ago.\" No evidence, please ask 1:1 to monitor BM status and I & O. New orders/consults:, Speech Therapy and PT, CBC w/platelets & diff, BMP, CRP Inflammation, Pt to walk around the unit every shift, Pt to sit in recliner every shift after walk, Offer juice, apple sauce and/or pudding             "

## 2022-01-21 NOTE — PROGRESS NOTES
"PSYCHIATRY  PROGRESS NOTE     DATE OF SERVICE   01/21/2022       CHIEF COMPLAINT   Patient currently in the psychiatric unit due to inability to care for himself.       SUBJECTIVE   Nursing reports: Note:   Patient Alert and able to make needs known in the beginning of the day shift. Calm, cooperative and med complaint. Systolic blood pressure was out of parameters. BP med held. Continue to encourage fluids. During initial assessments, Pt denies all psych symptoms and contracts for safety. He continues to be isolated to room, did not eat breakfast. Patient drank some ensure and two cups of orange juice and one serving of applesauce. Patient continue to rest in bed. Couple of hours later during tele health,  noticed patient not responsive to his baseline, eyes open and patient was breathing. Rapid response called for more evaluation. Patient was placed on IV infusion and VS checks every 30 minutes. VSS remains stable. Patient on 1:1. Ate lunch and appears to be more alert.  He remained pleasant with a flat and blunted affect. Stated, \"I pooped three days ago.\" No evidence, please ask 1:1 to monitor BM status and I & O. New orders/consults:, Speech Therapy and PT, CBC w/platelets & diff, BMP, CRP Inflammation, Pt to walk around the unit every shift, Pt to sit in recliner every shift after walk, Offer juice, apple sauce and/or pudding    Patient has been discussed with the .  At this time there are no updates on the discharge plans.       OBJECTIVE   Patient was seen and evaluated at bedside with  present during the assessment, initially this was done with the use of telehealth. Patient was withdrawn, in bed in an ackward position, starring away form the IPad and not responding to any of my questions.  I decided to go into the unit and assess the patient.  When I met with the patient he was not responsive as he has been on prior visits.  He was staring at the walls and was unable to follow " "commands.  At the time I decided to call an RRT.  Patient was evaluated by the medical team.  After 15 minutes patient was able to say a few words.  It seems that throughout the day the patient has been going back again to his baseline.  Multiple blood tests have been ordered and we continue monitoring the patient.       MEDICATIONS   Medications:  Scheduled Meds:    aspirin  81 mg Oral Daily     atorvastatin  80 mg Oral At Bedtime     busPIRone HCl  30 mg Oral BID     cloZAPine  200 mg Oral At Bedtime     donepezil  10 mg Oral At Bedtime     gabapentin  100 mg Oral TID w/meals     haloperidol  1 mg Oral At Bedtime     influenza recomb quadrivalent PF  0.5 mL Intramuscular Prior to discharge     levothyroxine  88 mcg Oral Daily     megestrol  20 mg Oral Daily     memantine  10 mg Oral BID     metoprolol tartrate  25 mg Oral BID     mirtazapine  15 mg Oral At Bedtime     polyethylene glycol  17 g Oral Daily     salmeterol  1 puff Inhalation BID     senna-docusate  1 tablet Oral BID     Continuous Infusions:  PRN Meds:.albuterol, bisacodyl, risperiDONE    Medication adherence issues: MS Med Adherence Y/N: Yes, Hospitalization  Medication side effects: MEDICATION SIDE EFFECTS: no side effects reported  Benefit: Yes / No: Yes       ROS   A comprehensive review of systems was negative.       MENTAL STATUS EXAM   Vitals: BP 98/66   Pulse 88   Temp 97.8  F (36.6  C) (Oral)   Resp 17   Ht 1.854 m (6' 1\")   Wt 72.3 kg (159 lb 4.8 oz)   SpO2 97%   BMI 21.02 kg/m      Appearance:  No apparent distress  Mood: Unable to assess  Affect: blunted  was congruent to speech  Suicidal Ideation: Unable to assess  Homicidal Ideation: Unable to assess  Thought process: Unable to assess  Thought content: Unable to assess  Fund of Knowledge: Below average  Attention/Concentration: Easily distracted  Language ability: Significantly impaired  Memory: Global memory impairment  Insight:  Poor.  Judgement: Poor  Orientation: Only to " person  Psychomotor Behavior: slowed    Muscle Strength and Tone: MuscleStrength: Normal and Atrophy  Gait and Station: Not evaluated       LABS   personally reviewed.     No results found for: PHENYTOIN, PHENOBARB, VALPROATE, CBMZ       DIAGNOSIS   Principal Problem:    Chronic schizophrenia (H)    Active Problem List:  Patient Active Problem List   Diagnosis     TBI with aggressive behavior     HTN (hypertension)     COPD (chronic obstructive pulmonary disease) (H)     Dementia with behavioral disturbance (H)     Chronic schizophrenia (H)     Smoker     Agitation     Behavior disturbance     Psychosis (H)          PLAN   1. Ongoing education given regarding diagnostic and treatment options with risks, benefits and alternatives and adequate verbalization of understanding.  2.  Medications       BuSpar 30 mg 2 times daily       Clozaril 200 mg at bedtime       Aricept 10 mg at bedtime       Gabapentin 100 mg 3 times a day       Haldol 1 mg at bedtime       Namenda 10 mg 2 times a day       Remeron 15 mg at bedtime  3.  Medical team following the patient  4.   creating a safe discharge plan with the patient.    Risk Assessment: [facility] RISK ASSESSMENT: Patient able to contract for safety    Coordination of Care:   Treatment Plan reviewed and physician signed, Care discussed with Care/Treatment Team Members, Chart reviewed and Patient seen      Re-Certification I certify that the inpatient psychiatric facility services furnished since the previous certification were, and continue to be, medically necessary for, either, treatment which could reasonably be expected to improve the patient s condition or diagnostic study and that the hospital records indicate that the services furnished were, either, intensive treatment services, admission and related services necessary for diagnostic study, or equivalent services.     I certify that the patient continues to need, on a daily basis, active treatment furnished  directly by or requiring the supervision of inpatient psychiatric facility personnel.   I estimate 14 days of hospitalization is necessary for proper treatment of the patient. My plans for post-hospital care for this patient are  TBD     Ginger Dubon MD    -     01/21/2022  -     10:12 AM    Total time  25 minutes with > 50%spent on coordination of cares and psycho-education.    This note was created with help of Dragon dictation system. Grammatical / typing errors are not intentional.    Ginger Dubon MD

## 2022-01-22 PROCEDURE — 250N000013 HC RX MED GY IP 250 OP 250 PS 637

## 2022-01-22 PROCEDURE — 128N000001 HC R&B CD/MH ADULT

## 2022-01-22 PROCEDURE — 258N000003 HC RX IP 258 OP 636: Performed by: HOSPITALIST

## 2022-01-22 PROCEDURE — 250N000013 HC RX MED GY IP 250 OP 250 PS 637: Performed by: PSYCHIATRY & NEUROLOGY

## 2022-01-22 RX ADMIN — HALOPERIDOL 1 MG: 1 TABLET ORAL at 21:00

## 2022-01-22 RX ADMIN — MEMANTINE HYDROCHLORIDE 10 MG: 10 TABLET, FILM COATED ORAL at 08:06

## 2022-01-22 RX ADMIN — DONEPEZIL HYDROCHLORIDE 10 MG: 5 TABLET, FILM COATED ORAL at 20:59

## 2022-01-22 RX ADMIN — SENNOSIDES AND DOCUSATE SODIUM 1 TABLET: 50; 8.6 TABLET ORAL at 20:59

## 2022-01-22 RX ADMIN — SENNOSIDES AND DOCUSATE SODIUM 1 TABLET: 50; 8.6 TABLET ORAL at 08:06

## 2022-01-22 RX ADMIN — LEVOTHYROXINE SODIUM 88 MCG: 0.09 TABLET ORAL at 08:06

## 2022-01-22 RX ADMIN — SODIUM CHLORIDE: 9 INJECTION, SOLUTION INTRAVENOUS at 06:23

## 2022-01-22 RX ADMIN — ATORVASTATIN CALCIUM 80 MG: 40 TABLET, FILM COATED ORAL at 20:59

## 2022-01-22 RX ADMIN — SALMETEROL XINAFOATE 1 PUFF: 50 POWDER, METERED ORAL; RESPIRATORY (INHALATION) at 08:05

## 2022-01-22 RX ADMIN — MEMANTINE HYDROCHLORIDE 10 MG: 10 TABLET, FILM COATED ORAL at 20:59

## 2022-01-22 RX ADMIN — ASPIRIN 81 MG: 81 TABLET, COATED ORAL at 08:06

## 2022-01-22 RX ADMIN — MIRTAZAPINE 15 MG: 7.5 TABLET, FILM COATED ORAL at 20:59

## 2022-01-22 RX ADMIN — BUSPIRONE HYDROCHLORIDE 30 MG: 10 TABLET ORAL at 20:58

## 2022-01-22 RX ADMIN — GABAPENTIN 100 MG: 100 CAPSULE ORAL at 08:06

## 2022-01-22 RX ADMIN — POLYETHYLENE GLYCOL 3350 17 G: 17 POWDER, FOR SOLUTION ORAL at 08:07

## 2022-01-22 RX ADMIN — GABAPENTIN 100 MG: 100 CAPSULE ORAL at 11:47

## 2022-01-22 RX ADMIN — SALMETEROL XINAFOATE 1 PUFF: 50 POWDER, METERED ORAL; RESPIRATORY (INHALATION) at 21:05

## 2022-01-22 RX ADMIN — CLOZAPINE 200 MG: 100 TABLET ORAL at 20:59

## 2022-01-22 RX ADMIN — BUSPIRONE HYDROCHLORIDE 30 MG: 10 TABLET ORAL at 08:06

## 2022-01-22 RX ADMIN — MEGESTROL ACETATE 20 MG: 20 TABLET ORAL at 08:06

## 2022-01-22 ASSESSMENT — ACTIVITIES OF DAILY LIVING (ADL)
ORAL_HYGIENE: INDEPENDENT
LAUNDRY: UNABLE TO COMPLETE
DRESS: SCRUBS (BEHAVIORAL HEALTH)
DRESS: SCRUBS (BEHAVIORAL HEALTH)
ORAL_HYGIENE: PROMPTS
HYGIENE/GROOMING: INDEPENDENT
HYGIENE/GROOMING: HANDWASHING;INDEPENDENT

## 2022-01-22 NOTE — PLAN OF CARE
"  Problem: Behavioral Health Plan of Care  Goal: Plan of Care Review  Outcome: No Change  Flowsheets  Taken 1/21/2022 1858 by Ignacia Barraza, RN  Progress: no change  Taken 1/21/2022 0900 by Lizzeth Burroughs RN  Plan of Care Reviewed With: patient  Patient Agreement with Plan of Care: (\"when can I go home?\") agrees with comment (describe)     "

## 2022-01-22 NOTE — PROGRESS NOTES
DANIEL RN f/u on RRT:  Pt is alert and responding to simple yes or no questions.  However he appears exhausted and his systolic blood pressure is still slightly low (95/67). The rest of his vital signs are with in normal limits.  Lungs sounds are clear and bowel sound positive in all quadrants.  He is still on the ivf normal saline at 100 ml/hr and is closely being monitored by his assigned nurse (RN). Continue to monitor. Deepak Mclean RN.

## 2022-01-22 NOTE — PROGRESS NOTES
[facility]    Medicine Progress Note - Hospitalist Service    Date of Admission:  1/18/2022    Brief Summary: John Juárez is a 56 year old male admitted on 1/18/2022. He was admitted on 1/13/22 to [facility]. He has a PMH of COPD, cardiomyopathy, endocarditis s/p bioprosthetic valves, CVA, hypothyroidism, TBI and schizophrenia. He is under commitment and tested positive for COVID 1/13. Pt is confused and only oriented to self, and patient provides limited history. Rapid response was called for unresponsiveness, see Dr Ruiz note 1/21.   Hospital medicine continues to follow for constipation and medical issues.    Assessment & Plan        # Confirmed COVID-19 infection          Symptom Onset unknown   Date of 1st Positive Test 1/13/2022   Vaccination Status Fully Vaccinated, no booster          - COVID-19 special precautions, spot check pulse ox  - Oxygen: continue current support with room air; titrate to keep SpO2 between 90-96%  - Labs: CBC, BMP  - Imaging: no additional imaging needed at this time  - Breathing treatments: inhalers ordered for COPD and albuterol prn ; avoid nebulizers in favor of MDIs   - IV fluids: not indicated at this time  - Antibiotics: not indicated   - COVID-Focused Medications: No COVID-specific therapies are appropriate at this time.  - DVT Prophylaxis:        - encourage ambulation, PT consult for unsteady gait and potential need for walker. PT still needs to see.     #Constipation  - Possibly no bowel movement for 6 days (pt states it was 2 days ago)  -scheduled senna and miralax  - one time dose of lactulose 20 mg given 1/20/22.  Staff report patient had large bowel movement early this morning.  No reports of abdominal pain or nausea.  Continue current scheduled bowel regimen     #risk for malnourishment  - encouraged patient to eat more.  He refused breakfast but had 75% of lunch.  Also ate 100% of his snack  - order placed for staff to monitor meals and  help patient eat.  Patient has no teeth and is on a puréed diet.  He reports having dentures at home  - ordered ensure with meals. Nutrition consult      #COPD  - Lungs clear with good air movement, no wheezing  - Continue PTA salmeterol and albuterol PRN     #Hypertension  - Has been hypotensive since admission  - resumed metoprolol with parameters, now on hold, if is restarted will start at a small dose  - metoprolol put on hold after rapid response  -Received IVF yesterday, blood pressure 104/73.  No further orthostatics taken.  No dizziness with ambulation.  Continue to hold metoprolol and reevaluate tomorrow.  Peripheral IV discontinued today   - CBC and CMP stable, no significant change     #Hypothyroidism  - TSH 2.71 on 11/4/21  - Continue PTA levothyroxine 88 mcg daily     #CVA  - Basal ganglia stroke 2015  -Continue PTA aspirin 81 mg and atorvastatin 80 mg daily  -received a pureed diet for lunch, consult Speech therapy to buzz rodriguez      #Schizophrenia, neurocognitive disorder secondary to TBI  - manage per primary care team        Diet: Regular Diet Adult  Snacks/Supplements Adult: Ensure Enlive; With Meals    DVT Prophylaxis: Low Risk/Ambulatory with no VTE prophylaxis indicated  Valentin Catheter: Not present  Central Lines: None  Cardiac Monitoring: None  Code Status: Full Code      Disposition Plan   Expected Discharge: Patient tells me he was living with his sister and brother-in-law in [place], however demographics state patient lives in a group home.  He appears unkempt and will need social work evaluation for safe discharge planning     Total floor/unit time spent was 25 minutes in reviewing the record, medications, lab results and completing documentation. 15 minutes spent in counseling and discussion with patient regarding diagnosis, medications and treatment plan. Care discussed and coordinated with patient and nurse.     [facility]  Hospitalist Service  [facility]  "[facility]  Securely message with the [employer] Console (learn more here)  Text page via [facility] Paging/Directory        ______________________________________________________________________    Interval History   Patient was sitting in the lounge area eating a doughnut.  He had some difficulty with this because he has no teeth.  Patient ambulated to the room without difficulty.  He asked me \"Am Iin [facility]?\"  He thinks he has had [facility].  I asked him where he lived and he recited a [place] address and tells me he lives with his sister and brother-in-law.  He made no mention of living in a group home.  He appears to be delusional and stares at the wall occasionally, but denies seeing anything on the wall nurse reporting that he refused breakfast but did eat 100% of his puréed lunch and just had his snack.  He has fluids at his bedside.  He does not feel he is having any problem eating and drinking.  He reports having dentures \"at home.\" Regarding COVID symptoms patient denies any fever, chills, muscle aches, sore throat, cough.  I did hear a congested nonproductive slight cough during the visit today.  Patient thinks he had a bowel movement 2 days ago but staff confirmed that he had a large bowel movement early this morning on night shift.    Review of Systems: 10 point review of systems negative except for pertinent positives mentioned in HPI    Data reviewed today: I reviewed all medications, new labs and imaging results over the last 24 hours. I personally reviewed no images or EKG's today.    Physical Exam   Vital Signs: Temp: 97.8  F (36.6  C) Temp src: Oral BP: 106/71 Pulse: 75   Resp: 18 SpO2: 98 % O2 Device: None (Room air)    Weight: 159 lbs 4.8 oz  General: Alert, calm, unkempt in appearance with long hair, untrimmed yellow fingernails, missing teeth, no apparent distress  HEENT: Normocephalic, atraumatic, mucous membranes pink and moist, all teeth missing, no " lymphadenopathy   Respiratory: Lung sounds clear bilaterally, non-labored  Cardiovascular: S1, S2, rhythm rate regular, negative murmur, negative lower extremity edema  Gastrointestinal: Bowel sounds positive x4, nondistended and non-tender  Musculoskeletal: No joint deformities  Neurological: Alert and oriented to self, speech intact, moves all extremities equally, sensation intact    Data   Recent Labs   Lab 01/21/22  2244 01/21/22  1515 01/21/22  1124 01/21/22  1043 01/18/22  0642 01/17/22  1225   WBC  --   --  4.3  --  3.7*  --    HGB  --   --  12.8*  --  13.0*  --    MCV  --   --  94  --  90  --    PLT  --   --  174  --  178  --    NA  --  143  --   --   --  139   POTASSIUM  --  3.6  --   --   --  3.9   CHLORIDE  --  114*  --   --   --  112*   CO2  --  18*  --   --   --  21   BUN  --  13  --   --   --  12   CR  --  0.68*  --   --   --  0.65*   ANIONGAP  --  11  --   --   --  6   LESLEE  --  8.8  --   --   --  8.8   * 116  --  131*  --  83   ALBUMIN  --  3.0*  --   --   --   --    PROTTOTAL  --  6.8  --   --   --   --    BILITOTAL  --  0.4  --   --   --   --    ALKPHOS  --  61  --   --   --   --    ALT  --  15  --   --   --   --    AST  --  32  --   --   --   --      No results found for this or any previous visit (from the past 24 hour(s)).  Medications       aspirin  81 mg Oral Daily     atorvastatin  80 mg Oral At Bedtime     busPIRone HCl  30 mg Oral BID     cloZAPine  200 mg Oral At Bedtime     donepezil  10 mg Oral At Bedtime     gabapentin  100 mg Oral TID w/meals     haloperidol  1 mg Oral At Bedtime     influenza recomb quadrivalent PF  0.5 mL Intramuscular Prior to discharge     levothyroxine  88 mcg Oral Daily     megestrol  20 mg Oral Daily     memantine  10 mg Oral BID     [Held by provider] metoprolol tartrate  25 mg Oral BID     mirtazapine  15 mg Oral At Bedtime     polyethylene glycol  17 g Oral Daily     salmeterol  1 puff Inhalation BID     senna-docusate  1 tablet Oral BID

## 2022-01-22 NOTE — PROGRESS NOTES
"Pt irritable, weak and unsteady on his feet, assist with feeding ate 10% of supper, had snack and 240cc fluid intake, numerous attempts to redirect from pulling his IV, site CDI, denies pain, pt internally stimulated with occasional self talk, up x2 to void, endorses anxiety with passive SI, when asked if he has thoughts of harming self states\" Yes, what the Fuck is wrong with you, , you all keep asking me the same question, I will keep lying every time\", med compliant, given sips of fluid, shouting at writer asking when would he be able to discharge to home, denies depression, pt has a wet non productive cough, incontinent of urine, was wearing an incontinent brief at start of shift, which he took out and would not don on a new brief, currently receiving IV hydration with a bedside sitter for safety.  "

## 2022-01-22 NOTE — PLAN OF CARE
"  Problem: Behavioral Health Plan of Care  Goal: Adheres to Safety Considerations for Self and Others  Outcome: Improving  Goal: Absence of New-Onset Illness or Injury  Outcome: Improving     Problem: Behavioral Health Plan of Care  Goal: Plan of Care Review  Recent Flowsheet Documentation  Taken 1/22/2022 0900 by Sheila Palmer RN  Plan of Care Reviewed With: patient  Patient Agreement with Plan of Care: agrees    Pt. Stayed in room all shift, Refused breakfast, eat 75% of lunch staff has been encouraging fluids all shift, IV pulled and 1:1 discontinues pt. Repeatedly states that he wants to go home. Slept all afternoon. Endorsed anxiety 4, unable to rate depression, denies depression SI/HI and hallucinations, pt. Shakes his head during assessment question and states \"I just want to go home\". Contracted for safety.    "

## 2022-01-22 NOTE — PLAN OF CARE
Problem: Behavioral Health Plan of Care  Goal: Plan of Care Review  Outcome: Improving     Problem: Suicidal Behavior  Goal: Suicidal Behavior is Absent or Managed  Outcome: Improving   Pt endorsed anxiety, and SI, denied depression. When asked if pt has any plans for the SI, he became irritated and started using profanities. He wants his IV access out by all means. He is on a 1:1 monitor for that purpose. Otherwise pt remains pleasant with a flat and blunted affect. Per day shift report, pt stated that he had a bowel movement three days ago. IV fluid infusing at 100 ml/hr. No other concerns noted or verbalized.  Will continue with same plan of care.

## 2022-01-22 NOTE — PLAN OF CARE
Problem: Behavioral Health Plan of Care  Goal: Adheres to Safety Considerations for Self and Others  Intervention: Develop and Maintain Individualized Safety Plan  Recent Flowsheet Documentation  Taken 1/22/2022 0312 by Regina Navarro RN  Safety Measures:    environmental rounds completed    safety rounds completed     Problem: Suicidal Behavior  Goal: Suicidal Behavior is Absent or Managed  Outcome: Improving     Problem: Sleep Disturbance  Goal: Adequate Sleep/Rest  Outcome: Improving    Patient continued on 1:1 supervision for continuous IV fluids infusion, with 0.9 % Sodium Chloride running at 100 ml/hr. He slept for most part of the night, up to the bathroom x 3. No concerns noted/reported. He had more than 6 hrs of sleep. Will continue to monitor.

## 2022-01-23 PROCEDURE — 250N000013 HC RX MED GY IP 250 OP 250 PS 637: Performed by: PSYCHIATRY & NEUROLOGY

## 2022-01-23 PROCEDURE — 250N000013 HC RX MED GY IP 250 OP 250 PS 637

## 2022-01-23 PROCEDURE — 99207 PR NON-BILLABLE SERV PER CHARTING: CPT

## 2022-01-23 PROCEDURE — 128N000001 HC R&B CD/MH ADULT

## 2022-01-23 RX ADMIN — SALMETEROL XINAFOATE 1 PUFF: 50 POWDER, METERED ORAL; RESPIRATORY (INHALATION) at 20:09

## 2022-01-23 RX ADMIN — ATORVASTATIN CALCIUM 80 MG: 40 TABLET, FILM COATED ORAL at 20:11

## 2022-01-23 RX ADMIN — GABAPENTIN 100 MG: 100 CAPSULE ORAL at 16:51

## 2022-01-23 RX ADMIN — HALOPERIDOL 1 MG: 1 TABLET ORAL at 20:11

## 2022-01-23 RX ADMIN — GABAPENTIN 100 MG: 100 CAPSULE ORAL at 11:42

## 2022-01-23 RX ADMIN — ASPIRIN 81 MG: 81 TABLET, COATED ORAL at 08:10

## 2022-01-23 RX ADMIN — MEGESTROL ACETATE 20 MG: 20 TABLET ORAL at 08:10

## 2022-01-23 RX ADMIN — SALMETEROL XINAFOATE 1 PUFF: 50 POWDER, METERED ORAL; RESPIRATORY (INHALATION) at 08:11

## 2022-01-23 RX ADMIN — BUSPIRONE HYDROCHLORIDE 30 MG: 10 TABLET ORAL at 08:10

## 2022-01-23 RX ADMIN — LEVOTHYROXINE SODIUM 88 MCG: 0.09 TABLET ORAL at 08:11

## 2022-01-23 RX ADMIN — CLOZAPINE 200 MG: 100 TABLET ORAL at 20:10

## 2022-01-23 RX ADMIN — DONEPEZIL HYDROCHLORIDE 10 MG: 5 TABLET, FILM COATED ORAL at 20:11

## 2022-01-23 RX ADMIN — POLYETHYLENE GLYCOL 3350 17 G: 17 POWDER, FOR SOLUTION ORAL at 08:12

## 2022-01-23 RX ADMIN — SENNOSIDES AND DOCUSATE SODIUM 1 TABLET: 50; 8.6 TABLET ORAL at 20:11

## 2022-01-23 RX ADMIN — MIRTAZAPINE 15 MG: 7.5 TABLET, FILM COATED ORAL at 20:10

## 2022-01-23 RX ADMIN — MEMANTINE HYDROCHLORIDE 10 MG: 10 TABLET, FILM COATED ORAL at 08:10

## 2022-01-23 RX ADMIN — SENNOSIDES AND DOCUSATE SODIUM 1 TABLET: 50; 8.6 TABLET ORAL at 08:10

## 2022-01-23 RX ADMIN — GABAPENTIN 100 MG: 100 CAPSULE ORAL at 08:10

## 2022-01-23 RX ADMIN — BUSPIRONE HYDROCHLORIDE 30 MG: 10 TABLET ORAL at 20:10

## 2022-01-23 RX ADMIN — MEMANTINE HYDROCHLORIDE 10 MG: 10 TABLET, FILM COATED ORAL at 20:10

## 2022-01-23 ASSESSMENT — ACTIVITIES OF DAILY LIVING (ADL)
DRESS: SCRUBS (BEHAVIORAL HEALTH)
HYGIENE/GROOMING: INDEPENDENT
HYGIENE/GROOMING: INDEPENDENT;HANDWASHING
DRESS: SCRUBS (BEHAVIORAL HEALTH)
ORAL_HYGIENE: INDEPENDENT
LAUNDRY: UNABLE TO COMPLETE
LAUNDRY: UNABLE TO COMPLETE
ORAL_HYGIENE: PROMPTS;INDEPENDENT

## 2022-01-23 NOTE — PLAN OF CARE
"  Problem: Behavioral Health Plan of Care  Goal: Optimized Coping Skills in Response to Life Stressors  Outcome: No Change     Problem: Behavioral Health Plan of Care  Goal: Adheres to Safety Considerations for Self and Others  Outcome: Improving  Intervention: Develop and Maintain Individualized Safety Plan  Recent Flowsheet Documentation  Taken 1/23/2022 0844 by Sheila Palmer RN  Safety Measures:   environmental rounds completed   safety rounds completed   Pt. stayed in room all shift resting, Eat 25% of breakfast, and 50% of lunch. Staff continue to encourage fluids, and pt. drank several glasses of water. Repeatedly asks \"when am I going home?\". Endorses anxiety 7, depression 4, denies pain SI/HI and hallucinations, contracted for safety. AM BP - 106/69  "

## 2022-01-23 NOTE — PLAN OF CARE
"  Problem: Behavioral Health Plan of Care  Goal: Develops/Participates in [facility] [place] to Support Successful Transition  Outcome: No Change  Intervention: Foster The[facility] [place]  Recent Flowsheet Documentation  Taken 1/22/2022 1730 by Lizzeth Burroughs RN  Trust Relationship/Rapport:    care explained    empathic listening provided    questions answered    reassurance provided    thoughts/feelings acknowledged     Problem: Behavioral Health Plan of Care  Goal: Plan of Care Review  Outcome: Improving  Flowsheets (Taken 1/22/2022 1730)  Plan of Care Reviewed With: patient  Patient Agreement with Plan of Care: (\"I want to go home') agrees with comment (describe)  Goal: Patient-Specific Goal (Individualization)  Outcome: Improving  Note:     Goal: Adheres to Safety Considerations for Self and Others  Outcome: Improving  Intervention: Develop and Maintain Individualized Safety Plan  Recent Flowsheet Documentation  Taken 1/22/2022 1730 by Lizzeth Burroughs RN  Safety Measures:    environmental rounds completed    safety rounds completed  Goal: Absence of New-Onset Illness or Injury  Outcome: Improving  Intervention: Identify and Manage Fall Risk  Recent Flowsheet Documentation  Taken 1/22/2022 1730 by Lizzeth Burroughs RN  Safety Measures:    environmental rounds completed    safety rounds completed  Goal: Optimized Coping Skills in Response to Life Stressors  Outcome: Improving     Problem: Suicidal Behavior  Goal: Suicidal Behavior is Absent or Managed  Outcome: Improving     Problem: Sleep Disturbance  Goal: Adequate Sleep/Rest  Outcome: Improving    Pt. Calm, partially cooperative and stayed in room all shift. Refused supper, eat 50% of snacks and drank 600ml. Staff continue to encourage fluids all shift. Repeatedly states that \"when can I go home?\" Slept all afternoon. Denies pain, anxiety, depression, SI/HI and hallucinations, pt. Shakes his head during assessment question and states \"I just want to go " "home\". Contracted for safety.      New orders/consults as follows: Nutrition consult, Speech Therapy and PT, CBC w/platelets & diff, BMP, CRP Inflammation, Pt to walk around the unit every shift, Pt to sit in recliner every shift after walk, Offer juice, apple sauce and/or pudding .     Continue to monitor pt's BP and push fluid. Pt declined to walk around, come out in milieu. pt weak and unsteady.    Pt on medical bed for safety due to unsteady gait.     "

## 2022-01-23 NOTE — PLAN OF CARE
Problem: Behavioral Health Plan of Care  Goal: Patient-Specific Goal (Individualization)  Outcome: Improving  Note: Shift [place]:  Patient isolative in his room. Ate 10% of his dinner. Unsteady gait.    Patient's Stated Goal for Shift:  Safety    Goal Status:  In Process       Patient remains isolative in his room. Unsteady gait noted as patient ambulates to bathroom. No fall incident. Was served dinner in his room. Poor appetite noted. Patient only had sherbet from his tray. He reported anxiety 8 and depression 9. Scheduled Gabapentin administered. Patient denied SI/HI or hallucinations. He had not much of his snacks but was medication compliant. No cheeking observed. Patient on medical bed dur to Failure to thrive and multiple co-morbidities.

## 2022-01-23 NOTE — PLAN OF CARE
Problem: Behavioral Health Plan of Care  Goal: Adheres to Safety Considerations for Self and Others  Intervention: Develop and Maintain Individualized Safety Plan  Recent Flowsheet Documentation  Taken 1/23/2022 0156 by Regina Navarro, RN  Safety Measures:   environmental rounds completed   safety rounds completed     Problem: Sleep Disturbance  Goal: Adequate Sleep/Rest  Outcome: Improving      Patient slept through the night, without any acute distress. Safety checks in place per protocol. No concern noted/reported. He had 7 hours of sleep.

## 2022-01-23 NOTE — PROGRESS NOTES
"Pt. Calm, partially cooperative and stayed in room all shift. Refused supper, eat 50% of snacks and drank 600ml. Staff continue to encourage fluids all shift. Repeatedly states that \"when can I go home?\" Slept all afternoon. Denies pain, anxiety, depression, SI/HI and hallucinations, pt. Shakes his head during assessment question and states \"I just want to go home\". Contracted for safety.      New orders/consults as follows: Nutrition consult, Speech Therapy and PT, CBC w/platelets & diff, BMP, CRP Inflammation, Pt to walk around the unit every shift, Pt to sit in recliner every shift after walk, Offer juice, apple sauce and/or pudding .     Continue to monitor pt's BP and push fluid. Pt declined to walk around, come out in milieu. pt weak and unsteady.    Pt on medical bed for safety due to unsteady gait.   "

## 2022-01-23 NOTE — PROGRESS NOTES
Chart check  Patient states he ate breakfast. Dietician to see and will follow for their recs.   discontinue speech therapy consult, I did not realize he was on pureed because of no teeth. Discontinue metoprolol for soft blood pressures.    Lynda CARVALHO, CNP  Hospital Medicine Service  [facility]

## 2022-01-24 ENCOUNTER — APPOINTMENT (OUTPATIENT)
Dept: PHYSICAL THERAPY | Facility: CLINIC | Age: 57
DRG: 885 | End: 2022-01-24
Payer: COMMERCIAL

## 2022-01-24 ENCOUNTER — TELEPHONE (OUTPATIENT)
Dept: BEHAVIORAL HEALTH | Facility: CLINIC | Age: 57
End: 2022-01-24

## 2022-01-24 PROCEDURE — 128N000001 HC R&B CD/MH ADULT

## 2022-01-24 PROCEDURE — 97161 PT EVAL LOW COMPLEX 20 MIN: CPT | Mod: GP

## 2022-01-24 PROCEDURE — 99232 SBSQ HOSP IP/OBS MODERATE 35: CPT

## 2022-01-24 PROCEDURE — 250N000013 HC RX MED GY IP 250 OP 250 PS 637: Performed by: PSYCHIATRY & NEUROLOGY

## 2022-01-24 PROCEDURE — 99231 SBSQ HOSP IP/OBS SF/LOW 25: CPT | Mod: GT | Performed by: PSYCHIATRY & NEUROLOGY

## 2022-01-24 PROCEDURE — 250N000013 HC RX MED GY IP 250 OP 250 PS 637

## 2022-01-24 RX ADMIN — GABAPENTIN 100 MG: 100 CAPSULE ORAL at 17:14

## 2022-01-24 RX ADMIN — MEGESTROL ACETATE 20 MG: 20 TABLET ORAL at 08:53

## 2022-01-24 RX ADMIN — GABAPENTIN 100 MG: 100 CAPSULE ORAL at 13:43

## 2022-01-24 RX ADMIN — ATORVASTATIN CALCIUM 80 MG: 40 TABLET, FILM COATED ORAL at 21:04

## 2022-01-24 RX ADMIN — LEVOTHYROXINE SODIUM 88 MCG: 0.09 TABLET ORAL at 08:53

## 2022-01-24 RX ADMIN — MEMANTINE HYDROCHLORIDE 10 MG: 10 TABLET, FILM COATED ORAL at 09:00

## 2022-01-24 RX ADMIN — MEMANTINE HYDROCHLORIDE 10 MG: 10 TABLET, FILM COATED ORAL at 21:03

## 2022-01-24 RX ADMIN — DONEPEZIL HYDROCHLORIDE 10 MG: 5 TABLET, FILM COATED ORAL at 21:03

## 2022-01-24 RX ADMIN — SENNOSIDES AND DOCUSATE SODIUM 1 TABLET: 50; 8.6 TABLET ORAL at 21:03

## 2022-01-24 RX ADMIN — SALMETEROL XINAFOATE 1 PUFF: 50 POWDER, METERED ORAL; RESPIRATORY (INHALATION) at 08:57

## 2022-01-24 RX ADMIN — GABAPENTIN 100 MG: 100 CAPSULE ORAL at 08:52

## 2022-01-24 RX ADMIN — BUSPIRONE HYDROCHLORIDE 30 MG: 10 TABLET ORAL at 08:52

## 2022-01-24 RX ADMIN — HALOPERIDOL 1 MG: 1 TABLET ORAL at 21:04

## 2022-01-24 RX ADMIN — POLYETHYLENE GLYCOL 3350 17 G: 17 POWDER, FOR SOLUTION ORAL at 08:53

## 2022-01-24 RX ADMIN — MIRTAZAPINE 15 MG: 7.5 TABLET, FILM COATED ORAL at 21:04

## 2022-01-24 RX ADMIN — SENNOSIDES AND DOCUSATE SODIUM 1 TABLET: 50; 8.6 TABLET ORAL at 08:54

## 2022-01-24 RX ADMIN — CLOZAPINE 200 MG: 100 TABLET ORAL at 21:03

## 2022-01-24 RX ADMIN — ASPIRIN 81 MG: 81 TABLET, COATED ORAL at 08:52

## 2022-01-24 RX ADMIN — BUSPIRONE HYDROCHLORIDE 30 MG: 10 TABLET ORAL at 21:03

## 2022-01-24 RX ADMIN — SALMETEROL XINAFOATE 1 PUFF: 50 POWDER, METERED ORAL; RESPIRATORY (INHALATION) at 21:03

## 2022-01-24 ASSESSMENT — ACTIVITIES OF DAILY LIVING (ADL)
HYGIENE/GROOMING: INDEPENDENT;PROMPTS
DRESS: INDEPENDENT
DRESS: INDEPENDENT;PROMPTS
LAUNDRY: WITH SUPERVISION
HYGIENE/GROOMING: INDEPENDENT
ORAL_HYGIENE: INDEPENDENT;PROMPTS
ORAL_HYGIENE: INDEPENDENT

## 2022-01-24 NOTE — DISCHARGE SUMMARY
"PSYCHIATRY  PROGRESS NOTE     DATE OF SERVICE   01/24/2022  The patient is a 56 year old male who is being evaluated via a video billable telemedicine visit. The patient/guardian has consented to being seen via telemedicine. The provider was in front of a computer in a home office. The patient was on the inpatient unit at [facility].     Start time: 10:33 AM   Stop time: 10:38 AM     The patient/guardian has been notified of the following:     This telemedicine visit is conducted live between you and your clinician. We have found that certain health care needs can be provided without the need for a physical exam. This service lets us provide the care you need with a telemedicine conversation.           CHIEF COMPLAINT   \"I want to go home\"       SUBJECTIVE   Nursing reports: Note:   Patient was in bed throughout shift, sleeping on and off. He was unable to answer assessment, saying \" When can I go home. Call the police now\".  He seems confused, difficult to reorient to situation. Safety interventions maintained per protocol. He ate 25 % of both breakfast and Lunch. Needs encouragement to drink fluids. He continued on cheeking precautions, observed swallowing medications.    Patient has been discussed with the .    Assessment/Intervention/Current Symtoms and Care Coordination: Writer met with pt on this date and consulted with psychiatrist. Writer and psychiatrist met with pt via the [employer] system. Pt asks to discharge home, but appears confused. Pt is observed to experiencing difficulty tracking conversation. Pt repeatedly asks when he is going to leave. Pt is covid-19 recovered on this date.      Writer left  for pt's mental health : Kurtis to provide update on pt being covid-19 recovered and returning to the [facility].       OBJECTIVE   Patient was seen and evaluated at bedside with  present during the assessment, this was done with the use of telehealth. " "Patient present as more alert today. He was constantly asking when he will be able to return home. Was not able to understand the commitment process, need for placement and that he will need to return to [place]. Continues to have the believe that he has a mansion in [facility].    Patient is COVID recovered today. Patient has been discuss with the medical team.       MEDICATIONS   Medications:  Scheduled Meds:    aspirin  81 mg Oral Daily     atorvastatin  80 mg Oral At Bedtime     busPIRone HCl  30 mg Oral BID     cloZAPine  200 mg Oral At Bedtime     donepezil  10 mg Oral At Bedtime     gabapentin  100 mg Oral TID w/meals     haloperidol  1 mg Oral At Bedtime     influenza recomb quadrivalent PF  0.5 mL Intramuscular Prior to discharge     levothyroxine  88 mcg Oral Daily     megestrol  20 mg Oral Daily     memantine  10 mg Oral BID     mirtazapine  15 mg Oral At Bedtime     polyethylene glycol  17 g Oral Daily     salmeterol  1 puff Inhalation BID     senna-docusate  1 tablet Oral BID     Continuous Infusions:  PRN Meds:.albuterol, bisacodyl, risperiDONE    Medication adherence issues: MS Med Adherence Y/N: Yes, Hospitalization  Medication side effects: MEDICATION SIDE EFFECTS: no side effects reported  Benefit: Yes / No: Yes       ROS   A comprehensive review of systems was negative.       MENTAL STATUS EXAM   Vitals: BP 99/69   Pulse 94   Temp 97.3  F (36.3  C) (Oral)   Resp 17   Ht 1.854 m (6' 1\")   Wt 72.3 kg (159 lb 4.8 oz)   SpO2 98%   BMI 21.02 kg/m      Appearance:  No apparent distress  Mood: Unable to assess  Affect: blunted  was congruent to speech  Suicidal Ideation: Unable to assess  Homicidal Ideation: Unable to assess  Thought process: concrete and perseverative   Thought content: confused   Fund of Knowledge: Below average  Attention/Concentration: Easily distracted  Language ability: Significantly impaired  Memory: Global memory impairment  Insight:  Poor.  Judgement: " Poor  Orientation: Only to person  Psychomotor Behavior: slowed    Muscle Strength and Tone: MuscleStrength: Normal and Atrophy  Gait and Station: Not evaluated       LABS   personally reviewed.     No results found for: PHENYTOIN, PHENOBARB, VALPROATE, CBMZ       DIAGNOSIS   Principal Problem:    Chronic schizophrenia (H)    Active Problem List:  Patient Active Problem List   Diagnosis     TBI with aggressive behavior     HTN (hypertension)     COPD (chronic obstructive pulmonary disease) (H)     Dementia with behavioral disturbance (H)     Chronic schizophrenia (H)     Smoker     Agitation     Behavior disturbance     Psychosis (H)          PLAN   1. Ongoing education given regarding diagnostic and treatment options with risks, benefits and alternatives and adequate verbalization of understanding.  2.  Medications       BuSpar 30 mg 2 times daily       Clozaril 200 mg at bedtime       Aricept 10 mg at bedtime       Gabapentin 100 mg 3 times a day       Haldol 1 mg at bedtime       Namenda 10 mg 2 times a day       Remeron 15 mg at bedtime  3.  Medical team following the patient  4.   creating a safe discharge plan with the patient.    Risk Assessment: [facility] RISK ASSESSMENT: Patient able to contract for safety    Coordination of Care:   Treatment Plan reviewed and physician signed, Care discussed with Care/Treatment Team Members, Chart reviewed and Patient seen      Re-Certification I certify that the inpatient psychiatric facility services furnished since the previous certification were, and continue to be, medically necessary for, either, treatment which could reasonably be expected to improve the patient s condition or diagnostic study and that the hospital records indicate that the services furnished were, either, intensive treatment services, admission and related services necessary for diagnostic study, or equivalent services.     I certify that the patient continues to need, on a daily basis,  active treatment furnished directly by or requiring the supervision of inpatient psychiatric facility personnel.   I estimate 14 days of hospitalization is necessary for proper treatment of the patient. My plans for post-hospital care for this patient are  TBD     Ginger Dubon MD    -     01/24/2022  -     8:34 PM    Total time  15 minutes with > 50%spent on coordination of cares and psycho-education.    This note was created with help of Dragon dictation system. Grammatical / typing errors are not intentional.    Ginger Dubon MD

## 2022-01-24 NOTE — PLAN OF CARE
"  Problem: Behavioral Health Plan of Care  Goal: Plan of Care Review  Recent Flowsheet Documentation  Taken 1/24/2022 1034 by Regina Navarro, RN  Plan of Care Reviewed With: patient     Problem: Behavioral Health Plan of Care  Goal: Absence of New-Onset Illness or Injury  Intervention: Identify and Manage Fall Risk  Recent Flowsheet Documentation  Taken 1/24/2022 1034 by Regina Navarro, RN  Safety Measures:    environmental rounds completed    safety rounds completed  Taken 1/24/2022 0247 by Regina Navarro, RN  Safety Measures:    environmental rounds completed    safety rounds completed     Problem: Suicidal Behavior  Goal: Suicidal Behavior is Absent or Managed  Outcome: Improving    Patient was in bed throughout shift, sleeping on and off. He was unable to answer assessment, saying \" When can I go home. Call the police now\".  He seems confused, difficult to reorient to situation. Safety interventions maintained per protocol. He ate 25 % of both breakfast and Lunch. Needs encouragement to drink fluids. He continued on cheeking precautions, observed swallowing medications.      "

## 2022-01-24 NOTE — PROGRESS NOTES
[facility]    Medicine Progress Note - Hospitalist Service    Date of Admission:  1/18/2022    Brief Summary: John Juárez is a 56 year old male admitted on 1/18/2022. He was admitted on 1/13/22 to [facility]. He has a PMH of COPD, cardiomyopathy, endocarditis s/p bioprosthetic valves, CVA, hypothyroidism, TBI and schizophrenia. He is under commitment and tested positive for COVID 1/13. Pt is confused and only oriented to self, and patient provides limited history. Rapid response was called for unresponsiveness, see Dr Ruiz note 1/21.   Hospital medicine continues to follow for constipation and medical issues.    Assessment & Plan   # Confirmed COVID-19 infection  Recovered        Symptom Onset unknown   Date of 1st Positive Test 1/13/2022   Vaccination Status Fully Vaccinated, no booster          - COVID-19 special precautions, spot check pulse ox  - Oxygen: continue current support with room air; titrate to keep SpO2 between 90-96%  - Labs: CBC, BMP  - Imaging: no additional imaging needed at this time  - Breathing treatments: inhalers ordered for COPD and albuterol prn ; avoid nebulizers in favor of MDIs   - IV fluids: not indicated at this time  - Antibiotics: not indicated   - COVID-Focused Medications: No COVID-specific therapies are appropriate at this time.  - DVT Prophylaxis:        - encourage ambulation, PT consult for unsteady gait and potential need for walker. PT still needs to see.         #Constipation  -scheduled senna and miralax  - one time dose of lactulose 20 mg given 1/20/22.  Staff report patient had large bowel movement early this morning.  No reports of abdominal pain or nausea.  Continue current scheduled bowel regimen     #risk for malnourishment  - encouraged patient to eat more.    - order placed for staff to monitor meals and help patient eat.  Patient has no teeth and is on a puréed diet.  He reports having dentures at home  - ordered ensure with meals.     - Nutrition saw patient and recommended pureed diet as needed d/t no teeth or dentures    #COPD  - Lungs clear with good air movement, no wheezing  - Continue PTA salmeterol and albuterol PRN     #Hypertension  - Has been hypotensive since admission  - discontinue metoprolol and do not continue at discharge  - metoprolol put on hold after rapid response  -Received IVF after rapid, blood pressure 91/59.  No further orthostatics taken.  No dizziness with ambulation.         #Hypothyroidism  - TSH 2.71 on 11/4/21  - Continue PTA levothyroxine 88 mcg daily     #CVA  - Basal ganglia stroke 2015  -Continue PTA aspirin 81 mg and atorvastatin 80 mg daily       #Schizophrenia, neurocognitive disorder secondary to TBI  - manage per primary care team     Hospital medicine will sign off. From our standpoint, ok to discharge.        Diet: Regular Diet Adult  Snacks/Supplements Adult: Ensure Enlive; With Meals    DVT Prophylaxis: Low Risk/Ambulatory with no VTE prophylaxis indicated  Valentin Catheter: Not present  Central Lines: None  Cardiac Monitoring: None  Code Status: Full Code      Disposition Plan   Expected Discharge: Patient tells me he was living with his sister and brother-in-law in [place], however demographics state patient lives in a group home.  He appears unkempt and will need social work evaluation for safe discharge planning     Total floor/unit time spent was 25 minutes in reviewing the record, medications, lab results and completing documentation. 15 minutes spent in counseling and discussion with patient regarding diagnosis, medications and treatment plan. Care discussed and coordinated with patient and nurse.     Lynda Cantu NP  Hospitalist Service  [facility]  Securely message with the [employer] Console (learn more here)  Text page via [employer] Paging/Directory        ______________________________________________________________________    Interval History   Resting  comfortably in bed. No complaints. States he is eating.     Review of Systems: 10 point review of systems negative except for pertinent positives mentioned in HPI    Data reviewed today: I reviewed all medications, new labs and imaging results over the last 24 hours. I personally reviewed no images or EKG's today.    Physical Exam   Vital Signs: Temp: 98.2  F (36.8  C) Temp src: Oral BP: 91/59 Pulse: 94   Resp: 16 SpO2: 96 % O2 Device: None (Room air)    Weight: 159 lbs 4.8 oz  General: Alert, calm, unkempt in appearance with long hair, untrimmed yellow fingernails, missing teeth, no apparent distress  HEENT: Normocephalic, atraumatic, mucous membranes pink and moist, all teeth missing, no lymphadenopathy   Respiratory: Lung sounds clear bilaterally, non-labored  Cardiovascular: S1, S2, rhythm rate regular, negative murmur, negative lower extremity edema  Gastrointestinal: Bowel sounds positive x4, nondistended and non-tender  Musculoskeletal: No joint deformities  Neurological: Alert and oriented to self, speech intact, moves all extremities equally, sensation intact    Data   Recent Labs   Lab 01/21/22  2244 01/21/22  1515 01/21/22  1124 01/21/22  1043 01/18/22  0642   WBC  --   --  4.3  --  3.7*   HGB  --   --  12.8*  --  13.0*   MCV  --   --  94  --  90   PLT  --   --  174  --  178   NA  --  143  --   --   --    POTASSIUM  --  3.6  --   --   --    CHLORIDE  --  114*  --   --   --    CO2  --  18*  --   --   --    BUN  --  13  --   --   --    CR  --  0.68*  --   --   --    ANIONGAP  --  11  --   --   --    LESLEE  --  8.8  --   --   --    * 116  --  131*  --    ALBUMIN  --  3.0*  --   --   --    PROTTOTAL  --  6.8  --   --   --    BILITOTAL  --  0.4  --   --   --    ALKPHOS  --  61  --   --   --    ALT  --  15  --   --   --    AST  --  32  --   --   --      No results found for this or any previous visit (from the past 24 hour(s)).  Medications       aspirin  81 mg Oral Daily     atorvastatin  80 mg Oral At  Bedtime     busPIRone HCl  30 mg Oral BID     cloZAPine  200 mg Oral At Bedtime     donepezil  10 mg Oral At Bedtime     gabapentin  100 mg Oral TID w/meals     haloperidol  1 mg Oral At Bedtime     influenza recomb quadrivalent PF  0.5 mL Intramuscular Prior to discharge     levothyroxine  88 mcg Oral Daily     megestrol  20 mg Oral Daily     memantine  10 mg Oral BID     mirtazapine  15 mg Oral At Bedtime     polyethylene glycol  17 g Oral Daily     salmeterol  1 puff Inhalation BID     senna-docusate  1 tablet Oral BID

## 2022-01-24 NOTE — PLAN OF CARE
Problem: Behavioral Health Plan of Care  Goal: Adheres to Safety Considerations for Self and Others  Intervention: Develop and Maintain Individualized Safety Plan  Recent Flowsheet Documentation  Taken 1/24/2022 0247 by Regina Navarro, RN  Safety Measures:    environmental rounds completed    safety rounds completed     Problem: Sleep Disturbance  Goal: Adequate Sleep/Rest  Outcome: Improving      Patient was observed sleeping through the night, without any acute distress. Safety checks in place per protocol. No concerns noted during this shift. He had 7 hrs of sleep.

## 2022-01-24 NOTE — PROGRESS NOTES
01/24/22 0948   Engagement   Intervention Group   Topic Detail OT Wellness group   Attendance Did not attend   Reason for Not Attending Refused

## 2022-01-24 NOTE — PLAN OF CARE
BEHAVIORAL TEAM DISCUSSION    Participants:     -Dr. Dk SANCHEZ  -Minh MALDONADO Charge RN  -Nan WORTHY OT  -Lindsay MURO PharmD    Progress: The same   Anticipated length of stay: Discharge back to [facility]   Continued Stay Criteria/Rationale: covid-19 status   Medical/Physical: positive covid-19 status, COPD, hypertension, hypothroidis,, CVA    Precautions:   Behavioral Orders   Procedures     Cheeking Precautions (behavioral units)     Code 1 - Restrict to Unit     Routine Programming     As clinically indicated     Status 15     Every 15 minutes.     Plan: Return To [facility]   Rationale for change in precautions or plan:  Recommendation is legal guardian

## 2022-01-24 NOTE — PLAN OF CARE
"  Assessment/Intervention/Current Symtoms and Care Coordination: Writer met with pt on this date and consulted with psychiatrist. Writer and psychiatrist met with pt via the [employer] (telehealth) system. Pt asks to discharge home, but appears confused. Pt is observed to experiencing difficulty tracking conversation. Pt repeatedly asks when he is going to leave. Pt is covid-19 recovered on this date.     Writer left  for pt's mental health : Kurtis to provide update on pt being covid-19 recovered and returning to the [facility].     Discharge Plan or Goal: Pending stabilization & development of a safe discharge plan.  Considerations include: Nursing home. Guardianship process is being pursued. Awaiting the outcome of new DHS \"Going Home Program\". Have also communicated to the  regarding the [employer] Program which is being engaged at the DHS level at this point.         Barriers to Discharge: Patient requires further psychiatric stabilization due to current symptomology, Medication management with possible adjustments and guardianship        Referral Status: TBD        Legal Status: MI commitment ([facility]) with Beau. [id number]     Important patient contacts:  Contacts:  Case Management Services:  [facility]   Kurtis Chapin. Phone: [phone]  Supervisor is [email or web address]     Freida Machuca  Sister  [phone]       JORDY Patel [facility], 1/24/2022, 11:45 AM   "

## 2022-01-24 NOTE — PROGRESS NOTES
01/24/22 1523   Quick Adds   Type of Visit Initial PT Evaluation   Living Environment   People in home facility resident   Current Living Arrangements group home   Home Accessibility no concerns   Transportation Anticipated health plan transportation   Self-Care   Usual Activity Tolerance moderate   Current Activity Tolerance fair   Equipment Currently Used at Home other (see comments)  (unknown)   General Information   Referring Physician 1/13/22   Pertinent History of Current Problem (include personal factors and/or comorbidities that impact the POC) John Juárez is a 56 year old male admitted on 1/18/2022. He was admitted on 1/13/22 to [facility]. He has a PMH of COPD, cardiomyopathy, endocarditis s/p bioprosthetic valves, CVA, hypothyroidism, TBI and schizophrenia. He is under commitment and tested positive for COVID 1/13. Pt is confused and only oriented to self, and patient provides limited history. Rapid response was called for unresponsiveness, see Dr Ruiz note 1/21.    Range of Motion (ROM)   ROM Comment declined   Strength   Strength Comments declined   Bed Mobility   Bed Mobility No deficits identified   Transfers   Transfers No deficits identified   Gait/Stairs (Locomotion)   Cougar Level (Gait) modified independence   Assistive Device (Gait)   (none)   Distance in Feet (Required for LE Total Joints) 25x2   Comment (Gait/Stairs) Pt. with slow pace and decreased stride length.  Writer asked pt. to try FWW, pt. firmly declined several times.   Clinical Impression   Criteria for Skilled Therapeutic Intervention evaluation only   PT Diagnosis (PT) impaired balance   Influenced by the following impairments mild gait impairment   Functional limitations due to impairments none   Clinical Presentation Stable/Uncomplicated   Clinical Presentation Rationale per clinical judgement   Clinical Decision Making (Complexity) low complexity   Therapy Frequency (PT) Evaluation only   Risk & Benefits of therapy  have been explained evaluation/treatment results reviewed;risks/benefits reviewed   Clinical Impression Comments Pt. with mildly impaired gait strongly declined use of FWW or to participate in any other activity.  Pt. is not appropriate for therapy at this time.  Writer did leave FWW with unit HUC should pt. change his mind.   PT Discharge Planning    PT Discharge Recommendation (DC Rec) Long term care facility   PT Rationale for DC Rec continue to needs care due to lack of particiaption / failure to thrive   Total Evaluation Time   Total Evaluation Time (Minutes) 12

## 2022-01-24 NOTE — TELEPHONE ENCOUNTER
S:  1-24-22 10 AM  Call from 2800 requesting IP MH transfer of COVID recovered patient.    B:  Hx of schizophrenia and TBI transferred from station 32 to a medical unit on 1/18/22 because was COVID POS, then 2800 and is now COVID recovered.   Prior to that was on a medical unit in [facility] for almost a year.  Patient is under commitment with yoelvais.  Oriented to self only.      A: Commitment w/ jarvais    R:  Patient cleared and ready for behavioral bed placement: Yes

## 2022-01-24 NOTE — CONSULTS
"BRIEF NUTRITION ASSESSMENT      REASON FOR ASSESSMENT:  John Juárez is a 56 year old male seen by Registered Dietitian for Provider Order - assess nutriton risk, poor appetite, IV fluids over weekend.    NUTRITION HISTORY:  Unable to obtain diet history at this time due to Covid-19 restrictions and pt. Unavailable to speak over the phone at this time.  Per chart note \"PMH of COPD, cardiomyopathy, endocarditis s/p bioprosthetic valves, CVA, hypothyroidism, TBI and schizophrenia\"  - pt. Admitted for not being able to care for himself  - was on pureed diet for missing all teeth, now on regular diet  - confused, alert to self  - per CNP note \"He does not feel he is having any problem eating and drinking.  He reports having dentures \"at home.\"\"   LBM 1/22    CURRENT DIET AND INTAKE:  Diet:  regular            Ensure Enlive with meals ordered by NP 1/21 - pt. Has not been receiving, diet office did not enter into meal ordering system ([employer])  - 0-50% meal intakes since admit, overall meeting <50% needs x5 days  - per RN note today, pt. In bed sleeping on and off, needs encouragement to take fluids  - per MD note 1/21 plan is for \"RT to assist feed with each meal\"  1/20: 50% 2 meals  1/21: 0 meal intakes  1/22: 25% 1 meal, 50% 1 meal  1/23: 25% 2 meals    ANTHROPOMETRICS:  Height: 6' 1\"  Weight:  159 lbs 4.8 oz  Body mass index is 21.02 kg/m .   Weight Status: Normal BMI  Weight History: missing weights 1/20, 1/22 (ordered 3x/week per MD)  - 2.5% weight loss in 9 days  - 14% weight loss in 10 months (26# weight loss since March 2021)  Wt Readings from Last 10 Encounters:   01/18/22 72.3 kg (159 lb 4.8 oz)   01/09/22 74.1 kg (163 lb 4.8 oz)   03/31/21 84.2 kg (185 lb 9.6 oz)   08/26/20 70.3 kg (155 lb)   07/11/20 69.9 kg (154 lb)   06/27/20 70.3 kg (155 lb)   02/04/20 67.6 kg (149 lb)   01/20/20 69.2 kg (152 lb 9 oz)   09/03/18 78 kg (172 lb)     Meds:  - levothyroxine, megace, remeron, miralax, " [facility]    LABS:  Labs noted  - electrolytes, BG WNL  Labs:  Electrolytes  Potassium (mmol/L)   Date Value   01/21/2022 3.6   01/17/2022 3.9   01/13/2022 3.5   01/13/2022 3.7   07/01/2021 4.3   06/18/2021 3.9   05/17/2021 4.0     Phosphorus (mg/dL)   Date Value   01/17/2022 3.7   01/13/2022 3.2   01/06/2022 3.7   01/03/2022 3.7   12/30/2021 3.9   09/28/2020 2.9    Blood Glucose  Glucose (mg/dL)   Date Value   01/21/2022 116   01/17/2022 83   01/13/2022 103 (H)   01/13/2022 98   01/12/2022 167 (H)   07/01/2021 84   06/18/2021 106 (H)   05/17/2021 121 (H)   02/06/2021 77   12/16/2020 103 (H)     GLUCOSE BY METER POCT (mg/dL)   Date Value   01/21/2022 103 (H)   01/21/2022 131 (H)     Hemoglobin A1C (%)   Date Value   08/26/2010 5.9   08/25/2010 5.8    Inflammatory Markers  CRP Inflammation (mg/L)   Date Value   01/13/2022 18.9 (H)   11/04/2021 31.0 (H)   10/29/2021 27.0 (H)     CRP (mg/dL)   Date Value   01/21/2022 0.7     WBC (10e9/L)   Date Value   07/09/2021 6.5   07/02/2021 5.9   07/01/2021 5.1     WBC Count (10e3/uL)   Date Value   01/21/2022 4.3   01/18/2022 3.7 (L)   01/13/2022 6.5     Albumin (g/dL)   Date Value   01/21/2022 3.0 (L)   01/13/2022 2.9 (L)   11/07/2021 2.5 (L)   07/07/2021 3.2 (L)   07/04/2021 3.4   07/02/2021 3.1 (L)      Magnesium (mg/dL)   Date Value   01/17/2022 2.0   01/13/2022 2.0   01/06/2022 2.1     Sodium (mmol/L)   Date Value   01/21/2022 143   01/17/2022 139   01/13/2022 139   01/13/2022 139   07/01/2021 140   06/18/2021 141   05/17/2021 140    Renal  Urea Nitrogen (mg/dL)   Date Value   01/21/2022 13   01/17/2022 12   01/13/2022 14   01/13/2022 14   07/01/2021 14   06/18/2021 15   05/17/2021 8     Creatinine (mg/dL)   Date Value   01/21/2022 0.68 (L)   01/17/2022 0.65 (L)   01/13/2022 0.61 (L)   01/13/2022 0.62 (L)   07/01/2021 0.90   06/18/2021 0.79   05/30/2021 0.81     Additional  Triglycerides (mg/dL)   Date Value   07/02/2021 116   08/26/2010 136   08/25/2010 105      Ketones Urine (mg/dL)   Date Value   11/05/2021 Negative   09/09/2020 Negative        MALNUTRITION:  Visual Nutrition Focused Physical Assessment (NFPA) not completed due to restrictions on face-to-face patient care during COVID-19 Pandemic.    % Weight Loss:  Up to 5% in 1 month (moderate malnutrition)  % Intake:  </= 50% for >/= 5 days (severe malnutrition)  Subcutaneous Fat Loss:  Unable to assess  Muscle Loss: Unable to assess  Fluid Retention: Unable to assess  Malnutrition: Patients meets criteria for moderate malnutrition in the context of acute on chronic illness.    NUTRITION INTERVENTION:  Nutrition Diagnosis:  Inadequate oral intake related to covid-19 or mental status as evidenced by pt. Meeting <50% nutrition needs in 6 days, needing IVF for hydration, severe weight loss in 9 days.    Recommendations:  Change diet to pureed if needed d/t no teeth or dentures.    Implementation:  Nutrition Education: unable to provide due to patient unavailable.  Ordered Ensure Enlive 2/day    FOLLOW UP/MONITORING:   Will continue to follow per guidelines.      Xi Yarbrough RDN, LD  Clinical Dietitian

## 2022-01-25 PROCEDURE — 128N000001 HC R&B CD/MH ADULT

## 2022-01-25 PROCEDURE — 250N000013 HC RX MED GY IP 250 OP 250 PS 637

## 2022-01-25 PROCEDURE — 250N000013 HC RX MED GY IP 250 OP 250 PS 637: Performed by: PSYCHIATRY & NEUROLOGY

## 2022-01-25 PROCEDURE — 99231 SBSQ HOSP IP/OBS SF/LOW 25: CPT | Mod: GT | Performed by: PSYCHIATRY & NEUROLOGY

## 2022-01-25 RX ADMIN — BUSPIRONE HYDROCHLORIDE 30 MG: 10 TABLET ORAL at 20:19

## 2022-01-25 RX ADMIN — CLOZAPINE 200 MG: 100 TABLET ORAL at 20:20

## 2022-01-25 RX ADMIN — GABAPENTIN 100 MG: 100 CAPSULE ORAL at 16:58

## 2022-01-25 RX ADMIN — GABAPENTIN 100 MG: 100 CAPSULE ORAL at 08:45

## 2022-01-25 RX ADMIN — ATORVASTATIN CALCIUM 80 MG: 40 TABLET, FILM COATED ORAL at 20:19

## 2022-01-25 RX ADMIN — GABAPENTIN 100 MG: 100 CAPSULE ORAL at 13:29

## 2022-01-25 RX ADMIN — LEVOTHYROXINE SODIUM 88 MCG: 0.09 TABLET ORAL at 08:45

## 2022-01-25 RX ADMIN — SALMETEROL XINAFOATE 1 PUFF: 50 POWDER, METERED ORAL; RESPIRATORY (INHALATION) at 20:18

## 2022-01-25 RX ADMIN — SALMETEROL XINAFOATE 1 PUFF: 50 POWDER, METERED ORAL; RESPIRATORY (INHALATION) at 08:49

## 2022-01-25 RX ADMIN — HALOPERIDOL 1 MG: 1 TABLET ORAL at 20:19

## 2022-01-25 RX ADMIN — SENNOSIDES AND DOCUSATE SODIUM 1 TABLET: 50; 8.6 TABLET ORAL at 08:45

## 2022-01-25 RX ADMIN — ASPIRIN 81 MG: 81 TABLET, COATED ORAL at 08:45

## 2022-01-25 RX ADMIN — SENNOSIDES AND DOCUSATE SODIUM 1 TABLET: 50; 8.6 TABLET ORAL at 20:19

## 2022-01-25 RX ADMIN — BUSPIRONE HYDROCHLORIDE 30 MG: 10 TABLET ORAL at 08:45

## 2022-01-25 RX ADMIN — MIRTAZAPINE 15 MG: 7.5 TABLET, FILM COATED ORAL at 20:19

## 2022-01-25 RX ADMIN — DONEPEZIL HYDROCHLORIDE 10 MG: 5 TABLET, FILM COATED ORAL at 20:19

## 2022-01-25 RX ADMIN — MEMANTINE HYDROCHLORIDE 10 MG: 10 TABLET, FILM COATED ORAL at 08:45

## 2022-01-25 RX ADMIN — POLYETHYLENE GLYCOL 3350 17 G: 17 POWDER, FOR SOLUTION ORAL at 08:44

## 2022-01-25 RX ADMIN — MEMANTINE HYDROCHLORIDE 10 MG: 10 TABLET, FILM COATED ORAL at 20:19

## 2022-01-25 RX ADMIN — MEGESTROL ACETATE 20 MG: 20 TABLET ORAL at 08:45

## 2022-01-25 ASSESSMENT — ACTIVITIES OF DAILY LIVING (ADL)
ORAL_HYGIENE: INDEPENDENT;PROMPTS
HYGIENE/GROOMING: INDEPENDENT
ORAL_HYGIENE: INDEPENDENT
DRESS: PROMPTS
HYGIENE/GROOMING: HANDWASHING;INDEPENDENT
DRESS: SCRUBS (BEHAVIORAL HEALTH)

## 2022-01-25 NOTE — PLAN OF CARE
Problem: Behavioral Health Plan of Care  Goal: Plan of Care Review  Outcome: Improving     Problem: Suicidal Behavior  Goal: Suicidal Behavior is Absent or Managed  Outcome: Improving   Pt appears to be sleeping comfortably during all safety checks. No signs of pain or discomfort noted. Will continue with same plan of care.

## 2022-01-25 NOTE — PROGRESS NOTES
Pt is COVID Recovered. [facility] behavioral intake dept notified and patient placed on the waitlist for an inpatient bed.

## 2022-01-25 NOTE — PROGRESS NOTES
"PSYCHIATRY  PROGRESS NOTE     DATE OF SERVICE   01/24/2022  The patient is a 56 year old male who is being evaluated via a video billable telemedicine visit. The patient/guardian has consented to being seen via telemedicine. The provider was in front of a computer in a home office. The patient was on the inpatient unit at [facility].     Start time: 10:33 AM   Stop time: 10:38 AM     The patient/guardian has been notified of the following:     This telemedicine visit is conducted live between you and your clinician. We have found that certain health care needs can be provided without the need for a physical exam. This service lets us provide the care you need with a telemedicine conversation.           CHIEF COMPLAINT   \"I want to go home\"       SUBJECTIVE   Nursing reports: Note:   Patient was in bed throughout shift, sleeping on and off. He was unable to answer assessment, saying \" When can I go home. Call the police now\".  He seems confused, difficult to reorient to situation. Safety interventions maintained per protocol. He ate 25 % of both breakfast and Lunch. Needs encouragement to drink fluids. He continued on cheeking precautions, observed swallowing medications.    Patient has been discussed with the .    Assessment/Intervention/Current Symtoms and Care Coordination: Writer met with pt on this date and consulted with psychiatrist. Writer and psychiatrist met with pt via the [employer] ([employer]) system. Pt asks to discharge home, but appears confused. Pt is observed to experiencing difficulty tracking conversation. Pt repeatedly asks when he is going to leave. Pt is covid-19 recovered on this date.      Writer left  for pt's mental health : Kurtis to provide update on pt being covid-19 recovered and returning to the [facility].       OBJECTIVE   Patient was seen and evaluated at bedside with  present during the assessment, this was done with the use of telehealth. " "Patient present as more alert today. He was constantly asking when he will be able to return home. Was not able to understand the commitment process, need for placement and that he will need to return to [facility]. Continues to have the believe that he has a mansion in [facility].    Patient is COVID recovered today. Patient has been discuss with the medical team.       MEDICATIONS   Medications:  Scheduled Meds:    aspirin  81 mg Oral Daily     atorvastatin  80 mg Oral At Bedtime     busPIRone HCl  30 mg Oral BID     cloZAPine  200 mg Oral At Bedtime     donepezil  10 mg Oral At Bedtime     gabapentin  100 mg Oral TID w/meals     haloperidol  1 mg Oral At Bedtime     influenza recomb quadrivalent PF  0.5 mL Intramuscular Prior to discharge     levothyroxine  88 mcg Oral Daily     megestrol  20 mg Oral Daily     memantine  10 mg Oral BID     mirtazapine  15 mg Oral At Bedtime     polyethylene glycol  17 g Oral Daily     salmeterol  1 puff Inhalation BID     senna-docusate  1 tablet Oral BID     Continuous Infusions:  PRN Meds:.albuterol, bisacodyl, risperiDONE    Medication adherence issues: MS Med Adherence Y/N: Yes, Hospitalization  Medication side effects: MEDICATION SIDE EFFECTS: no side effects reported  Benefit: Yes / No: Yes       ROS   A comprehensive review of systems was negative.       MENTAL STATUS EXAM   Vitals: BP 99/69   Pulse 94   Temp 97.3  F (36.3  C) (Oral)   Resp 17   Ht 1.854 m (6' 1\")   Wt 72.3 kg (159 lb 4.8 oz)   SpO2 98%   BMI 21.02 kg/m      Appearance:  No apparent distress  Mood: Unable to assess  Affect: blunted  was congruent to speech  Suicidal Ideation: Unable to assess  Homicidal Ideation: Unable to assess  Thought process: concrete and perseverative   Thought content: confused   Fund of Knowledge: Below average  Attention/Concentration: Easily distracted  Language ability: Significantly impaired  Memory: Global memory impairment  Insight:  Poor.  Judgement: " Poor  Orientation: Only to person  Psychomotor Behavior: slowed    Muscle Strength and Tone: MuscleStrength: Normal and Atrophy  Gait and Station: Not evaluated       LABS   personally reviewed.     No results found for: PHENYTOIN, PHENOBARB, VALPROATE, CBMZ       DIAGNOSIS   Principal Problem:    Chronic schizophrenia (H)    Active Problem List:  Patient Active Problem List   Diagnosis     TBI with aggressive behavior     HTN (hypertension)     COPD (chronic obstructive pulmonary disease) (H)     Dementia with behavioral disturbance (H)     Chronic schizophrenia (H)     Smoker     Agitation     Behavior disturbance     Psychosis (H)          PLAN   1. Ongoing education given regarding diagnostic and treatment options with risks, benefits and alternatives and adequate verbalization of understanding.  2.  Medications       BuSpar 30 mg 2 times daily       Clozaril 200 mg at bedtime       Aricept 10 mg at bedtime       Gabapentin 100 mg 3 times a day       Haldol 1 mg at bedtime       Namenda 10 mg 2 times a day       Remeron 15 mg at bedtime  3.  Medical team following the patient  4.   creating a safe discharge plan with the patient.    Risk Assessment: [facility] RISK ASSESSMENT: Patient able to contract for safety    Coordination of Care:   Treatment Plan reviewed and physician signed, Care discussed with Care/Treatment Team Members, Chart reviewed and Patient seen      Re-Certification I certify that the inpatient psychiatric facility services furnished since the previous certification were, and continue to be, medically necessary for, either, treatment which could reasonably be expected to improve the patient s condition or diagnostic study and that the hospital records indicate that the services furnished were, either, intensive treatment services, admission and related services necessary for diagnostic study, or equivalent services.     I certify that the patient continues to need, on a daily basis,  active treatment furnished directly by or requiring the supervision of inpatient psychiatric facility personnel.   I estimate 14 days of hospitalization is necessary for proper treatment of the patient. My plans for post-hospital care for this patient are  TBD     Ginger Dubon MD    -     01/24/2022  -     8:39 PM    Total time  15 minutes with > 50%spent on coordination of cares and psycho-education.    This note was created with help of Dragon dictation system. Grammatical / typing errors are not intentional.    Ginger Dubon MD

## 2022-01-25 NOTE — PLAN OF CARE
"    Assessment/Intervention/Current Symtoms and Care Coordination: Writer met with pt on this date with psychiatrist via the [employer] system (telehealth). Pt was seen and evaluated at bedside. Pt is more alert, but continues to ask to go home. Pt is under civil commitment with the recommendation of getting a legal guardian. Pt appears confused and became irritable when informed that he will return to [facility].       Discharge Plan or Goal: Pending stabilization & development of a safe discharge plan.  Considerations include: Nursing home. Guardianship process is being pursued. Awaiting the outcome of new DHS \"Going Home Program\". Have also communicated to the  regarding the [employer] Program which is being engaged at the DHS level at this point.         Barriers to Discharge: Patient requires further psychiatric stabilization due to current symptomology, Medication management with possible adjustments and guardianship        Referral Status: TBD        Legal Status: MI commitment ([facility]) with Beau. [id number]     Important patient contacts:  Contacts:  Case Management Services:  [facility]   Kurtis Chapin. Phone: [phone]  Supervisor is [email or web address]     Freida Machuca  Sister  [phone]      JORDY Patel [facility], 1/25/2022, 11:08 AM   "

## 2022-01-25 NOTE — PROGRESS NOTES
"PSYCHIATRY  PROGRESS NOTE     DATE OF SERVICE   01/25/2022  The patient is a 56 year old male who is being evaluated via a video billable telemedicine visit. The patient/guardian has consented to being seen via telemedicine. The provider was in front of a computer in a home office. The patient was on the inpatient unit at [facility].     Start time: 12:34 PM  Stop time: 12:36 PM    The patient/guardian has been notified of the following:     This telemedicine visit is conducted live between you and your clinician. We have found that certain health care needs can be provided without the need for a physical exam. This service lets us provide the care you need with a telemedicine conversation.           CHIEF COMPLAINT   \"I am all right\"       SUBJECTIVE   Nursing reports: Patient is isolative in room, sleeping on and off. Patient unable to answer assessment questions and  repeatedly asking \"when can I go home\".  Patient became irritable during the assessment and said \"get the F- out of here.\" Patient observed yelling 2 times in his room right after dinner. When writer checked on him, he was laying in bed. Patient is compliant with medication. No suicidal behavior noted.   Patient came out once at dinner time. Patient has poor appetite. Fluids offered as tolerated.  Patient is unkempt and disheveled.   Speech is hard to discern.  VSS.  Uses medical bed for cardiac and respiratory co-morbidities.    Patient has been discussed with the .    Assessment/Intervention/Current Symtoms and Care Coordination: Writer met with pt on this date with psychiatrist via the [employer] system ([employer]). Pt was seen and evaluated at bedside. Pt is more alert, but continues to ask to go home. Pt is under civil commitment with the recommendation of getting a legal guardian. Pt appears confused and became irritable when informed that he will return to [facility].      OBJECTIVE   Patient was seen and evaluated at bedside " "with  present during the assessment, this was done with the use of telehealth.  Patient seemed to be more alert today.  The patient has continued to ask when he will be able to return home.   and this writer reminded the patient that he is under commitment and the plan is for him to return to [facility].  Patient got irritated with this answer and decided to end the meeting.     MEDICATIONS   Medications:  Scheduled Meds:    aspirin  81 mg Oral Daily     atorvastatin  80 mg Oral At Bedtime     busPIRone HCl  30 mg Oral BID     cloZAPine  200 mg Oral At Bedtime     donepezil  10 mg Oral At Bedtime     gabapentin  100 mg Oral TID w/meals     haloperidol  1 mg Oral At Bedtime     influenza recomb quadrivalent PF  0.5 mL Intramuscular Prior to discharge     levothyroxine  88 mcg Oral Daily     megestrol  20 mg Oral Daily     memantine  10 mg Oral BID     mirtazapine  15 mg Oral At Bedtime     polyethylene glycol  17 g Oral Daily     salmeterol  1 puff Inhalation BID     senna-docusate  1 tablet Oral BID     Continuous Infusions:  PRN Meds:.albuterol, bisacodyl, risperiDONE    Medication adherence issues: MS Med Adherence Y/N: Yes, Hospitalization  Medication side effects: MEDICATION SIDE EFFECTS: no side effects reported  Benefit: Yes / No: Yes       ROS   A comprehensive review of systems was negative.       MENTAL STATUS EXAM   Vitals: BP 99/69   Pulse 94   Temp 97.3  F (36.3  C) (Oral)   Resp 17   Ht 1.854 m (6' 1\")   Wt 72.3 kg (159 lb 4.8 oz)   SpO2 98%   BMI 21.02 kg/m      Appearance:  No apparent distress  Mood: \"I am all right\"  Affect: blunted and irritable  was not congruent to speech  Suicidal Ideation: Unable to assess  Homicidal Ideation: Unable to assess  Thought process: concrete and perseverative   Thought content: confused   Fund of Knowledge: Below average  Attention/Concentration: Easily distracted  Language ability: Significantly impaired  Memory: Global memory " impairment  Insight:  Poor.  Judgement: Poor  Orientation: Only to person  Psychomotor Behavior: slowed    Muscle Strength and Tone: MuscleStrength: Normal and Atrophy  Gait and Station: Not evaluated       LABS   personally reviewed.     No results found for: PHENYTOIN, PHENOBARB, VALPROATE, CBMZ       DIAGNOSIS   Principal Problem:    Chronic schizophrenia (H)    Active Problem List:  Patient Active Problem List   Diagnosis     TBI with aggressive behavior     HTN (hypertension)     COPD (chronic obstructive pulmonary disease) (H)     Dementia with behavioral disturbance (H)     Chronic schizophrenia (H)     Smoker     Agitation     Behavior disturbance     Psychosis (H)          PLAN   1. Ongoing education given regarding diagnostic and treatment options with risks, benefits and alternatives and adequate verbalization of understanding.  2.  Medications       BuSpar 30 mg 2 times daily       Clozaril 200 mg at bedtime       Aricept 10 mg at bedtime       Gabapentin 100 mg 3 times a day       Haldol 1 mg at bedtime       Namenda 10 mg 2 times a day       Remeron 15 mg at bedtime  3.  Medical team following the patient  4.   creating a safe discharge plan with the patient.    Risk Assessment: [facility] RISK ASSESSMENT: Patient able to contract for safety    Coordination of Care:   Treatment Plan reviewed and physician signed, Care discussed with Care/Treatment Team Members, Chart reviewed and Patient seen      Re-Certification I certify that the inpatient psychiatric facility services furnished since the previous certification were, and continue to be, medically necessary for, either, treatment which could reasonably be expected to improve the patient s condition or diagnostic study and that the hospital records indicate that the services furnished were, either, intensive treatment services, admission and related services necessary for diagnostic study, or equivalent services.     I certify that the  patient continues to need, on a daily basis, active treatment furnished directly by or requiring the supervision of inpatient psychiatric facility personnel.   I estimate 14 days of hospitalization is necessary for proper treatment of the patient. My plans for post-hospital care for this patient are  TBD     Ginger Dubon MD    -     01/25/2022  -     12:34 PM    Total time  15 minutes with > 50%spent on coordination of cares and psycho-education.    This note was created with help of Dragon dictation system. Grammatical / typing errors are not intentional.    Ginger Dubon MD

## 2022-01-25 NOTE — PLAN OF CARE
Problem: Behavioral Health Plan of Care  Goal: Adheres to Safety Considerations for Self and Others  Outcome: Improving  Intervention: Develop and Maintain Individualized Safety Plan  Recent Flowsheet Documentation  Taken 1/24/2022 1700 by Almaz Liz RN  Safety Measures: safety rounds completed     Problem: Sleep Disturbance  Goal: Adequate Sleep/Rest  Outcome: Improving   Patient is calm and pleasant on approach. Isolative in room except for meals. At the beginning of the shift, patient asked for Zyprexa. Patient looks anxious not leaving the lounge until writer gave her the medication. Patient rates anxiety 5/10, depression 2/10, denies SI, HI, AH. Rates low back pain 4/10. Zyprexa 10 mg po, Lozenge, nicotine lozenge given at 1627 per request. Patient is medication compliant. Contracts for safety. Went to bed early declining to eat snacks.

## 2022-01-25 NOTE — PROGRESS NOTES
01/25/22 1054   Engagement   Intervention Group   Topic Detail OT Wellness Group   Attendance Did not attend

## 2022-01-25 NOTE — PLAN OF CARE
"  Problem: Behavioral Health Plan of Care  Goal: Absence of New-Onset Illness or Injury  Outcome: Improving  Intervention: Identify and Manage Fall Risk  Recent Flowsheet Documentation  Taken 1/24/2022 1700 by Almaz Liz RN  Safety Measures: safety rounds completed   Patient is isolative in room, sleeping on and off. Patient unable to answer assessment questions and  repeatedly asking \"when can I go home\".  Patient became irritable during the assessment and said \"get the F- out of here.\" Patient observed yelling 2 times in his room right after dinner. When writer checked on him, he was laying in bed. Patient is compliant with medication. No suicidal behavior noted.   Patient came out once at dinner time. Patient has poor appetite. Fluids offered as tolerated.  Patient is unkempt and disheveled.   Speech is hard to discern.  VSS.  Uses medical bed for cardiac and respiratory co-morbidities.  "

## 2022-01-25 NOTE — PLAN OF CARE
"  Problem: Behavioral Health Plan of Care  Goal: Develops/Participates in Therapeutic [place] to Support Successful Transition  Outcome: No Change  Intervention: Foster Therapeutic [place]  Recent Flowsheet Documentation  Taken 1/25/2022 1030 by Lizzeth Burroughs RN  Trust Relationship/Rapport:    care explained    empathic listening provided    questions answered    reassurance provided    thoughts/feelings acknowledged     Problem: Behavioral Health Plan of Care  Goal: Plan of Care Review  Outcome: Improving  Flowsheets (Taken 1/25/2022 1030)  Plan of Care Reviewed With: patient  Patient Agreement with Plan of Care: (\"when can i go home\") agrees with comment (describe)  Goal: Patient-Specific Goal (Individualization)  Outcome: Improving  Goal: Adheres to Safety Considerations for Self and Others  Outcome: Improving  Intervention: Develop and Maintain Individualized Safety Plan  Recent Flowsheet Documentation  Taken 1/25/2022 1030 by Lizzeth Burroughs RN  Safety Measures:    environmental rounds completed    safety rounds completed  Goal: Absence of New-Onset Illness or Injury  Outcome: Improving  Intervention: Identify and Manage Fall Risk  Recent Flowsheet Documentation  Taken 1/25/2022 1030 by Lizzeth Burroughs RN  Safety Measures:    environmental rounds completed    safety rounds completed  Goal: Optimized Coping Skills in Response to Life Stressors  Outcome: Improving     Problem: Suicidal Behavior  Goal: Suicidal Behavior is Absent or Managed  Outcome: Improving     Problem: Sleep Disturbance  Goal: Adequate Sleep/Rest  Outcome: Improving     Patient alert and oriented to self. Appears restless, Isolative to room, in bed majority of the shift. Ate 25% breakfast but refused lunch. Had some ensure, some apple sauce and orange juice. Refused to come out of the room or walk. Patient partially answered assessment questions. He repeatedly asked \"When can I go home\". Patient can easily became irritable during " assessment. Speech is hard to discern. Patient continue to refuse shower and oral care, pt remain unkempt and disheveled. Compliant with medication. Pt refused weight this morning. Possible discharged to [facility] when bed becomes available.

## 2022-01-26 ENCOUNTER — HOSPITAL ENCOUNTER (INPATIENT)
Facility: CLINIC | Age: 57
LOS: 260 days | Discharge: NURSING, REHAB OR LONG-TERM CARE | DRG: 885 | End: 2022-10-13
Attending: PSYCHIATRY & NEUROLOGY | Admitting: PSYCHIATRY & NEUROLOGY
Payer: COMMERCIAL

## 2022-01-26 VITALS
TEMPERATURE: 97.3 F | SYSTOLIC BLOOD PRESSURE: 85 MMHG | HEART RATE: 103 BPM | WEIGHT: 159.3 LBS | OXYGEN SATURATION: 96 % | BODY MASS INDEX: 21.11 KG/M2 | HEIGHT: 73 IN | DIASTOLIC BLOOD PRESSURE: 54 MMHG | RESPIRATION RATE: 18 BRPM

## 2022-01-26 DIAGNOSIS — F20.9 CHRONIC SCHIZOPHRENIA (H): ICD-10-CM

## 2022-01-26 DIAGNOSIS — F25.9 SCHIZOPHRENIA, SCHIZOAFFECTIVE, CHRONIC WITH ACUTE EXACERBATION (H): Primary | ICD-10-CM

## 2022-01-26 DIAGNOSIS — F41.9 ANXIETY: ICD-10-CM

## 2022-01-26 DIAGNOSIS — F02.818 DEMENTIA ASSOCIATED WITH OTHER UNDERLYING DISEASE WITH BEHAVIORAL DISTURBANCE (H): ICD-10-CM

## 2022-01-26 DIAGNOSIS — J42 CHRONIC BRONCHITIS, UNSPECIFIED CHRONIC BRONCHITIS TYPE (H): ICD-10-CM

## 2022-01-26 DIAGNOSIS — F03.91 DEMENTIA WITH BEHAVIORAL DISTURBANCE, UNSPECIFIED DEMENTIA TYPE: ICD-10-CM

## 2022-01-26 PROCEDURE — 250N000013 HC RX MED GY IP 250 OP 250 PS 637: Performed by: PSYCHIATRY & NEUROLOGY

## 2022-01-26 PROCEDURE — 250N000013 HC RX MED GY IP 250 OP 250 PS 637

## 2022-01-26 PROCEDURE — 99231 SBSQ HOSP IP/OBS SF/LOW 25: CPT | Mod: GT | Performed by: PSYCHIATRY & NEUROLOGY

## 2022-01-26 PROCEDURE — 124N000003 HC R&B MH SENIOR/ADOLESCENT

## 2022-01-26 RX ORDER — DONEPEZIL HYDROCHLORIDE 10 MG/1
10 TABLET, FILM COATED ORAL AT BEDTIME
Status: DISCONTINUED | OUTPATIENT
Start: 2022-01-26 | End: 2022-10-13 | Stop reason: HOSPADM

## 2022-01-26 RX ORDER — HYDROXYZINE HYDROCHLORIDE 25 MG/1
25 TABLET, FILM COATED ORAL EVERY 4 HOURS PRN
Status: DISCONTINUED | OUTPATIENT
Start: 2022-01-26 | End: 2022-10-13 | Stop reason: HOSPADM

## 2022-01-26 RX ORDER — ACETAMINOPHEN 325 MG/1
650 TABLET ORAL EVERY 4 HOURS PRN
Status: DISCONTINUED | OUTPATIENT
Start: 2022-01-26 | End: 2022-06-24

## 2022-01-26 RX ORDER — MEGESTROL ACETATE 20 MG/1
20 TABLET ORAL DAILY
Status: DISCONTINUED | OUTPATIENT
Start: 2022-01-27 | End: 2022-10-13 | Stop reason: HOSPADM

## 2022-01-26 RX ORDER — POLYETHYLENE GLYCOL 3350 17 G
2 POWDER IN PACKET (EA) ORAL
Status: DISCONTINUED | OUTPATIENT
Start: 2022-01-26 | End: 2022-09-08

## 2022-01-26 RX ORDER — MIRTAZAPINE 15 MG/1
15 TABLET, FILM COATED ORAL AT BEDTIME
Status: DISCONTINUED | OUTPATIENT
Start: 2022-01-26 | End: 2022-10-13 | Stop reason: HOSPADM

## 2022-01-26 RX ORDER — AMOXICILLIN 250 MG
2 CAPSULE ORAL
Status: DISCONTINUED | OUTPATIENT
Start: 2022-01-26 | End: 2022-10-13 | Stop reason: HOSPADM

## 2022-01-26 RX ORDER — POLYETHYLENE GLYCOL 3350 17 G/17G
17 POWDER, FOR SOLUTION ORAL 2 TIMES DAILY
Status: DISCONTINUED | OUTPATIENT
Start: 2022-01-26 | End: 2022-03-28

## 2022-01-26 RX ORDER — BUSPIRONE HYDROCHLORIDE 15 MG/1
30 TABLET ORAL 2 TIMES DAILY
Status: DISCONTINUED | OUTPATIENT
Start: 2022-01-26 | End: 2022-10-13 | Stop reason: HOSPADM

## 2022-01-26 RX ORDER — HALOPERIDOL 1 MG/1
1 TABLET ORAL AT BEDTIME
Status: DISCONTINUED | OUTPATIENT
Start: 2022-01-26 | End: 2022-05-23

## 2022-01-26 RX ORDER — METOPROLOL TARTRATE 25 MG/1
25 TABLET, FILM COATED ORAL 2 TIMES DAILY
Status: DISCONTINUED | OUTPATIENT
Start: 2022-01-26 | End: 2022-01-27

## 2022-01-26 RX ORDER — ALBUTEROL SULFATE 90 UG/1
1-2 AEROSOL, METERED RESPIRATORY (INHALATION) EVERY 4 HOURS PRN
Status: DISCONTINUED | OUTPATIENT
Start: 2022-01-26 | End: 2022-10-13 | Stop reason: HOSPADM

## 2022-01-26 RX ORDER — GABAPENTIN 100 MG/1
100 CAPSULE ORAL
Status: DISCONTINUED | OUTPATIENT
Start: 2022-01-27 | End: 2022-10-13 | Stop reason: HOSPADM

## 2022-01-26 RX ORDER — MEMANTINE HYDROCHLORIDE 10 MG/1
10 TABLET ORAL 2 TIMES DAILY
Status: DISCONTINUED | OUTPATIENT
Start: 2022-01-26 | End: 2022-10-13 | Stop reason: HOSPADM

## 2022-01-26 RX ORDER — POLYETHYLENE GLYCOL 3350 17 G/17G
17 POWDER, FOR SOLUTION ORAL DAILY
Qty: 1 EACH | Refills: 0 | Status: ON HOLD
Start: 2022-01-27 | End: 2022-09-20

## 2022-01-26 RX ORDER — LEVOTHYROXINE SODIUM 88 UG/1
88 TABLET ORAL DAILY
Status: DISCONTINUED | OUTPATIENT
Start: 2022-01-27 | End: 2022-10-13 | Stop reason: HOSPADM

## 2022-01-26 RX ORDER — ATORVASTATIN CALCIUM 80 MG/1
80 TABLET, FILM COATED ORAL AT BEDTIME
Status: DISCONTINUED | OUTPATIENT
Start: 2022-01-26 | End: 2022-05-24

## 2022-01-26 RX ORDER — ASPIRIN 81 MG/1
81 TABLET ORAL DAILY
Status: DISCONTINUED | OUTPATIENT
Start: 2022-01-27 | End: 2022-10-13 | Stop reason: HOSPADM

## 2022-01-26 RX ORDER — CLOZAPINE 100 MG/1
200 TABLET ORAL AT BEDTIME
Status: DISCONTINUED | OUTPATIENT
Start: 2022-01-26 | End: 2022-05-28

## 2022-01-26 RX ORDER — ACETAMINOPHEN 325 MG/1
650 TABLET ORAL EVERY 4 HOURS PRN
Status: DISCONTINUED | OUTPATIENT
Start: 2022-01-26 | End: 2022-01-26 | Stop reason: DRUGHIGH

## 2022-01-26 RX ORDER — MAGNESIUM HYDROXIDE/ALUMINUM HYDROXICE/SIMETHICONE 120; 1200; 1200 MG/30ML; MG/30ML; MG/30ML
30 SUSPENSION ORAL EVERY 4 HOURS PRN
Status: DISCONTINUED | OUTPATIENT
Start: 2022-01-26 | End: 2022-10-13 | Stop reason: HOSPADM

## 2022-01-26 RX ORDER — AMOXICILLIN 250 MG
1 CAPSULE ORAL 2 TIMES DAILY
Qty: 60 TABLET | Refills: 0 | Status: ON HOLD
Start: 2022-01-26 | End: 2022-09-20

## 2022-01-26 RX ORDER — RISPERIDONE 1 MG/1
1 TABLET, ORALLY DISINTEGRATING ORAL EVERY 6 HOURS PRN
Status: DISCONTINUED | OUTPATIENT
Start: 2022-01-26 | End: 2022-05-22

## 2022-01-26 RX ORDER — AMOXICILLIN 250 MG
1 CAPSULE ORAL 2 TIMES DAILY PRN
Status: DISCONTINUED | OUTPATIENT
Start: 2022-01-26 | End: 2022-01-26

## 2022-01-26 RX ORDER — BISACODYL 10 MG
10 SUPPOSITORY, RECTAL RECTAL DAILY PRN
Qty: 1 SUPPOSITORY | Refills: 0 | Status: ON HOLD
Start: 2022-01-26 | End: 2022-09-20

## 2022-01-26 RX ORDER — TRAZODONE HYDROCHLORIDE 50 MG/1
50 TABLET, FILM COATED ORAL
Status: DISCONTINUED | OUTPATIENT
Start: 2022-01-26 | End: 2022-05-22

## 2022-01-26 RX ADMIN — GABAPENTIN 100 MG: 100 CAPSULE ORAL at 13:57

## 2022-01-26 RX ADMIN — BUSPIRONE HYDROCHLORIDE 30 MG: 10 TABLET ORAL at 08:23

## 2022-01-26 RX ADMIN — SALMETEROL XINAFOATE 1 PUFF: 50 POWDER, METERED ORAL; RESPIRATORY (INHALATION) at 20:35

## 2022-01-26 RX ADMIN — GABAPENTIN 100 MG: 100 CAPSULE ORAL at 17:02

## 2022-01-26 RX ADMIN — SENNOSIDES AND DOCUSATE SODIUM 1 TABLET: 50; 8.6 TABLET ORAL at 08:23

## 2022-01-26 RX ADMIN — BUSPIRONE HYDROCHLORIDE 30 MG: 10 TABLET ORAL at 20:35

## 2022-01-26 RX ADMIN — ASPIRIN 81 MG: 81 TABLET, COATED ORAL at 08:23

## 2022-01-26 RX ADMIN — CLOZAPINE 200 MG: 100 TABLET ORAL at 20:34

## 2022-01-26 RX ADMIN — SENNOSIDES AND DOCUSATE SODIUM 1 TABLET: 50; 8.6 TABLET ORAL at 20:34

## 2022-01-26 RX ADMIN — GABAPENTIN 100 MG: 100 CAPSULE ORAL at 08:23

## 2022-01-26 RX ADMIN — MEMANTINE HYDROCHLORIDE 10 MG: 10 TABLET, FILM COATED ORAL at 08:23

## 2022-01-26 RX ADMIN — DONEPEZIL HYDROCHLORIDE 10 MG: 5 TABLET, FILM COATED ORAL at 20:34

## 2022-01-26 RX ADMIN — ATORVASTATIN CALCIUM 80 MG: 40 TABLET, FILM COATED ORAL at 20:35

## 2022-01-26 RX ADMIN — MEMANTINE HYDROCHLORIDE 10 MG: 10 TABLET, FILM COATED ORAL at 20:34

## 2022-01-26 RX ADMIN — HALOPERIDOL 1 MG: 1 TABLET ORAL at 20:35

## 2022-01-26 RX ADMIN — MEGESTROL ACETATE 20 MG: 20 TABLET ORAL at 08:23

## 2022-01-26 RX ADMIN — LEVOTHYROXINE SODIUM 88 MCG: 0.09 TABLET ORAL at 08:23

## 2022-01-26 RX ADMIN — SALMETEROL XINAFOATE 1 PUFF: 50 POWDER, METERED ORAL; RESPIRATORY (INHALATION) at 08:24

## 2022-01-26 RX ADMIN — MIRTAZAPINE 15 MG: 7.5 TABLET, FILM COATED ORAL at 20:35

## 2022-01-26 RX ADMIN — POLYETHYLENE GLYCOL 3350 17 G: 17 POWDER, FOR SOLUTION ORAL at 08:24

## 2022-01-26 ASSESSMENT — ACTIVITIES OF DAILY LIVING (ADL)
HYGIENE/GROOMING: INDEPENDENT
ORAL_HYGIENE: INDEPENDENT

## 2022-01-26 ASSESSMENT — MIFFLIN-ST. JEOR: SCORE: 1554.73

## 2022-01-26 NOTE — PLAN OF CARE
"  Assessment/Intervention/Current Symtoms and Care Coordination; The plan remains for pt to return back to [facility], pt is covid-19 recovered and continues to wait for bed to become available.     Pt is more alert on the date, but remains oriented to self. Pt states that the year is 2099, then stated year was 2098, and finally changed his response to 2029. When asked whom the president is, pt states Obama. Pt also believes that he is currently hospitalized at [facility]. Pt reports that he is  \"fine\" and was agreeable to taking a shower and changing his clothing.     Discharge Plan or Goal: Pending stabilization & development of a safe discharge plan.  Considerations include: Nursing home. Guardianship process is being pursued. Awaiting the outcome of new DHS \"Going Home Program\". Have also communicated to the  regarding the [employer] Program which is being engaged at the DHS level at this point.         Barriers to Discharge: Patient requires further psychiatric stabilization due to current symptomology, Medication management with possible adjustments and guardianship        Referral Status: TBD        Legal Status: MI commitment ([facility]) with Beau. [id number]     Important patient contacts:  Contacts:  Case Management Services:  [facility]   Kurtis Chapin. Phone: [phone]  Supervisor is [email or web address]      JORDY Patel, 1/26/2022, 10:17 AM   "

## 2022-01-26 NOTE — PROGRESS NOTES
"PSYCHIATRY  PROGRESS NOTE     DATE OF SERVICE   01/26/2022  The patient is a 56 year old male who is being evaluated via a video billable telemedicine visit. The patient/guardian has consented to being seen via telemedicine. The provider was in front of a computer in a home office. The patient was on the inpatient unit at [facility].     Start time: 9:51 AM  Stop time: 9:58 AM    The patient/guardian has been notified of the following:     This telemedicine visit is conducted live between you and your clinician. We have found that certain health care needs can be provided without the need for a physical exam. This service lets us provide the care you need with a telemedicine conversation.           CHIEF COMPLAINT   \"I want to get out of here.\"       SUBJECTIVE   Nursing reports: Patient came out for dinner and went back to bed as soon as he finished eating. Patient denies Covid symptoms saying \"just tired\". He endorses hearing voices and denies all other psych symptoms. Denies SI, HI. Remains compliant with medication. NA reports seeing the pt going to the bathroom.  Bed linens/blanket was changed by Nursing assistant. He refused to changed his scrubs saying he's dry.  Patient did not ask today about going home.       Patient has been discussed with the .    Assessment/Intervention/Current Symtoms and Care Coordination; The plan remains for pt to return back to [facility], pt is covid-19 recovered and continues to wait for bed to become available.      Pt is more alert on the date, but remains oriented to self. Pt states that the year is 2099, then stated year was 2098, and finally changed his response to 2029. When asked whom the president is, pt states Obama. Pt also believes that he is currently hospitalized at [facility]. Pt reports that he is  \"fine\" and was agreeable to taking a shower and changing his clothing.      OBJECTIVE   Patient was seen and evaluated at bedside with  present " "during the assessment, this was done with the use of telehealth.  Patient seemed to be more alert today and able to verbalize his needs.  The patient continues to insist that he needs to leave the hospital soon because he has been here for a long time.  When we asked that the patient if he knew today's date patient said that he was a year 2099 then he changed his answer to say that he is 2029.  Patient thinks that the president is Venessa and that he is hospitalized at [facility].  We discussed with the patient that he is at [facility].  I informed the patient that we are looking to see if he can return to [place] today but currently he is on the wait list.  At the end patient did agree to take a shower and changing his clothing.       MEDICATIONS   Medications:  Scheduled Meds:    aspirin  81 mg Oral Daily     atorvastatin  80 mg Oral At Bedtime     busPIRone HCl  30 mg Oral BID     cloZAPine  200 mg Oral At Bedtime     donepezil  10 mg Oral At Bedtime     gabapentin  100 mg Oral TID w/meals     haloperidol  1 mg Oral At Bedtime     influenza recomb quadrivalent PF  0.5 mL Intramuscular Prior to discharge     levothyroxine  88 mcg Oral Daily     megestrol  20 mg Oral Daily     memantine  10 mg Oral BID     mirtazapine  15 mg Oral At Bedtime     polyethylene glycol  17 g Oral Daily     salmeterol  1 puff Inhalation BID     senna-docusate  1 tablet Oral BID     Continuous Infusions:  PRN Meds:.albuterol, bisacodyl, risperiDONE    Medication adherence issues: MS Med Adherence Y/N: Yes, Hospitalization  Medication side effects: MEDICATION SIDE EFFECTS: no side effects reported  Benefit: Yes / No: Yes       ROS   A comprehensive review of systems was negative.       MENTAL STATUS EXAM   Vitals: BP (!) 85/54 (BP Location: Right arm, Patient Position: Semi-Nobles's)   Pulse 103   Temp 97.3  F (36.3  C) (Oral)   Resp 18   Ht 1.854 m (6' 1\")   Wt 72.3 kg (159 lb 4.8 oz)   SpO2 96%   BMI 21.02 kg/m  " "    Appearance:  No apparent distress  Mood: \"I am fine\"  Affect: blunted and calm  was not congruent to speech  Suicidal Ideation: Unable to assess  Homicidal Ideation: Unable to assess  Thought process: concrete and perseverative   Thought content: confused   Fund of Knowledge: Below average  Attention/Concentration: Easily distracted  Language ability: Significantly impaired  Memory: Global memory impairment  Insight:  Poor.  Judgement: Poor  Orientation: Only to person  Psychomotor Behavior: slowed    Muscle Strength and Tone: MuscleStrength: Normal and Atrophy  Gait and Station: Not evaluated       LABS   personally reviewed.     No results found for: PHENYTOIN, PHENOBARB, VALPROATE, CBMZ       DIAGNOSIS   Principal Problem:    Chronic schizophrenia (H)    Active Problem List:  Patient Active Problem List   Diagnosis     TBI with aggressive behavior     HTN (hypertension)     COPD (chronic obstructive pulmonary disease) (H)     Dementia with behavioral disturbance (H)     Chronic schizophrenia (H)     Smoker     Agitation     Behavior disturbance     Psychosis (H)          PLAN   1. Ongoing education given regarding diagnostic and treatment options with risks, benefits and alternatives and adequate verbalization of understanding.  2.  Medications       BuSpar 30 mg 2 times daily       Clozaril 200 mg at bedtime       Aricept 10 mg at bedtime       Gabapentin 100 mg 3 times a day       Haldol 1 mg at bedtime       Namenda 10 mg 2 times a day       Remeron 15 mg at bedtime  3.  Medical team following the patient  4.   creating a safe discharge plan with the patient.    Risk Assessment: [facility] RISK ASSESSMENT: Patient able to contract for safety    Coordination of Care:   Treatment Plan reviewed and physician signed, Care discussed with Care/Treatment Team Members, Chart reviewed and Patient seen      Re-Certification I certify that the inpatient psychiatric facility services furnished since the " previous certification were, and continue to be, medically necessary for, either, treatment which could reasonably be expected to improve the patient s condition or diagnostic study and that the hospital records indicate that the services furnished were, either, intensive treatment services, admission and related services necessary for diagnostic study, or equivalent services.     I certify that the patient continues to need, on a daily basis, active treatment furnished directly by or requiring the supervision of inpatient psychiatric facility personnel.   I estimate 14 days of hospitalization is necessary for proper treatment of the patient. My plans for post-hospital care for this patient are  TBD     Ginger Dubon MD    -     01/26/2022  -     9:51 AM    Total time  15 minutes with > 50%spent on coordination of cares and psycho-education.    This note was created with help of Dragon dictation system. Grammatical / typing errors are not intentional.    Ginger Dubon MD

## 2022-01-26 NOTE — PLAN OF CARE
Problem: Behavioral Health Plan of Care  Goal: Adheres to Safety Considerations for Self and Others  Intervention: Develop and Maintain Individualized Safety Plan  Recent Flowsheet Documentation  Taken 1/26/2022 0321 by Regina Navarro, RN  Safety Measures:   environmental rounds completed   safety rounds completed     Problem: Behavioral Health Plan of Care  Goal: Absence of New-Onset Illness or Injury  Intervention: Identify and Manage Fall Risk  Recent Flowsheet Documentation  Taken 1/26/2022 0321 by Regina Navarro, RN  Safety Measures:   environmental rounds completed   safety rounds completed     Problem: Sleep Disturbance  Goal: Adequate Sleep/Rest  Outcome: Improving    Patient was observed sleeping for most part of the night. He does not seem to be in any acute distress. Safety checks in place per protocol. He had greater than 6 hrs of sleep.

## 2022-01-26 NOTE — TELEPHONE ENCOUNTER
S:  1-24-22 10 AM  Call from 2800 requesting IP MH transfer of COVID recovered patient.     B:  Hx of schizophrenia and TBI transferred from station 32 to a medical unit on 1/18/22 because was COVID POS, then 2800 and is now COVID recovered.   Prior to that was on a medical unit in [facility] for almost a year.  Patient is under commitment with yoelvais.  Oriented to self only.       A: Commitment w/ jarvais     R:  ana maría/3b  Patient cleared and ready for behavioral bed placement: Yes

## 2022-01-26 NOTE — PLAN OF CARE
"  Problem: Behavioral Health Plan of Care  Goal: Absence of New-Onset Illness or Injury  Outcome: Improving  Intervention: Identify and Manage Fall Risk  Recent Flowsheet Documentation  Taken 1/25/2022 1700 by Almaz Liz RN  Safety Measures: safety rounds completed     Problem: Suicidal Behavior  Goal: Suicidal Behavior is Absent or Managed  Outcome: Improving   Patient came out for dinner and went back to bed as soon as he finished eating. Patient denies Covid symptoms saying \"just tired\". De endorses hearing voices and denies all other psych symptoms. Denies SI, HI. Remains compliant with medication. NA reports seeing the pt going to the bathroom.  Bed linens/blanket was changed by Nursing assistant. He refused to changed his scrubs saying he's dry.  Patient did not ask today about going home.    "

## 2022-01-26 NOTE — PLAN OF CARE
Problem: Behavioral Health Plan of Care  Goal: Plan of Care Review  Outcome: No Change  Flowsheets (Taken 1/26/2022 3659)  Plan of Care Reviewed With: patient  Patient Agreement with Plan of Care: agrees     Problem: Suicidal Behavior  Goal: Suicidal Behavior is Absent or Managed  Outcome: Improving    Patient denied pain and all psych symptoms. Patient appears to be in depressed mood. Poor appetite noted. Patient only had ensure and juice for breakfast and lunch. Shower given this shift. Med complaint and contracts for safety. Stayed in bed this shift. Occasional cough noted.

## 2022-01-27 PROCEDURE — 250N000013 HC RX MED GY IP 250 OP 250 PS 637: Performed by: PSYCHIATRY & NEUROLOGY

## 2022-01-27 PROCEDURE — 124N000003 HC R&B MH SENIOR/ADOLESCENT

## 2022-01-27 PROCEDURE — 99223 1ST HOSP IP/OBS HIGH 75: CPT | Mod: AI | Performed by: PSYCHIATRY & NEUROLOGY

## 2022-01-27 RX ADMIN — METOPROLOL TARTRATE 25 MG: 25 TABLET, FILM COATED ORAL at 08:54

## 2022-01-27 RX ADMIN — MIRTAZAPINE 15 MG: 15 TABLET, FILM COATED ORAL at 19:55

## 2022-01-27 RX ADMIN — SALMETEROL XINAFOATE 1 PUFF: 50 POWDER, METERED ORAL; RESPIRATORY (INHALATION) at 19:56

## 2022-01-27 RX ADMIN — BUSPIRONE HYDROCHLORIDE 30 MG: 15 TABLET ORAL at 19:55

## 2022-01-27 RX ADMIN — GABAPENTIN 100 MG: 100 CAPSULE ORAL at 08:54

## 2022-01-27 RX ADMIN — CLOZAPINE 200 MG: 100 TABLET ORAL at 19:55

## 2022-01-27 RX ADMIN — POLYETHYLENE GLYCOL 3350 17 G: 17 POWDER, FOR SOLUTION ORAL at 08:54

## 2022-01-27 RX ADMIN — ASPIRIN 81 MG: 81 TABLET, COATED ORAL at 08:54

## 2022-01-27 RX ADMIN — HALOPERIDOL 1 MG: 1 TABLET ORAL at 19:55

## 2022-01-27 RX ADMIN — LEVOTHYROXINE SODIUM 88 MCG: 0.09 TABLET ORAL at 08:54

## 2022-01-27 RX ADMIN — GABAPENTIN 100 MG: 100 CAPSULE ORAL at 17:31

## 2022-01-27 RX ADMIN — GABAPENTIN 100 MG: 100 CAPSULE ORAL at 13:22

## 2022-01-27 RX ADMIN — SALMETEROL XINAFOATE 1 PUFF: 50 POWDER, METERED ORAL; RESPIRATORY (INHALATION) at 08:54

## 2022-01-27 RX ADMIN — DONEPEZIL HYDROCHLORIDE 10 MG: 10 TABLET ORAL at 19:55

## 2022-01-27 RX ADMIN — MEGESTROL ACETATE 20 MG: 20 TABLET ORAL at 08:54

## 2022-01-27 RX ADMIN — BUSPIRONE HYDROCHLORIDE 30 MG: 15 TABLET ORAL at 08:54

## 2022-01-27 RX ADMIN — POLYETHYLENE GLYCOL 3350 17 G: 17 POWDER, FOR SOLUTION ORAL at 19:55

## 2022-01-27 RX ADMIN — MEMANTINE 10 MG: 10 TABLET ORAL at 19:55

## 2022-01-27 RX ADMIN — MEMANTINE 10 MG: 10 TABLET ORAL at 08:54

## 2022-01-27 RX ADMIN — ATORVASTATIN CALCIUM 80 MG: 80 TABLET, FILM COATED ORAL at 19:55

## 2022-01-27 ASSESSMENT — ACTIVITIES OF DAILY LIVING (ADL)
DRESS: SCRUBS (BEHAVIORAL HEALTH)
HYGIENE/GROOMING: PROMPTS;INDEPENDENT
ORAL_HYGIENE: PROMPTS;INDEPENDENT
ORAL_HYGIENE: INDEPENDENT;PROMPTS
LAUNDRY: UNABLE TO COMPLETE
HYGIENE/GROOMING: INDEPENDENT;PROMPTS
DRESS: SCRUBS (BEHAVIORAL HEALTH);INDEPENDENT

## 2022-01-27 ASSESSMENT — MIFFLIN-ST. JEOR: SCORE: 1502.56

## 2022-01-27 NOTE — PROGRESS NOTES
"  Admission Summary  Pt admitted to [facility] from [facility]. Pt is committed.  Pt arrived the unit at around 2130.    Per chart review, Pt has been  Hospitalized for over a year. Pt was on on this unit and was transferred to medical as he tested pos for COVID 01/13/2022. Pt was later admitted to [facility] and was cleared to return to [facility]. He has a PMH of COPD, cardiomyopathy, endocarditis s/p bioprosthetic valves (2015), CVA, HLD, hypothyroidism, TBI and schizophrenia.    VS: T 98.1, p 115, /73, R 16.  Pt is alert and oriented to self. Presents with a flat/blunted affect. Pt refuses to take participate in admission interview at this time, stating \"I'm too tired, I wanna go to sleep\". Pt did not respond when asked about SI/SIB/HI\". Pt was able to walk to his room. Gait at one point was wobbly when walking to his room. Pt denied being dizzy.     Tap bell at bed side. Pt reminded to call for help as needed. Pt agreed. Pt had gotten all bed time medications at [facility] prior to admission. Writer offered if patient wanted something to eat or a snack, Pt declined.   "

## 2022-01-27 NOTE — PLAN OF CARE
Problem: Behavioral Health Plan of Care  Goal: Plan of Care Review  1/26/2022 1842 by Davilmar, Claude, RN  Outcome: Adequate for Discharge  1/26/2022 1807 by Davilmar, Claude, RN  Outcome: No Change  Goal: Patient-Specific Goal (Individualization)  Outcome: Adequate for Discharge  Note:     Goal: Adheres to Safety Considerations for Self and Others  Outcome: Adequate for Discharge  Goal: Absence of New-Onset Illness or Injury  Outcome: Adequate for Discharge  Goal: Optimized Coping Skills in Response to Life Stressors  Outcome: Adequate for Discharge  Goal: Develops/Participates in Therapeutic [place] to Support Successful Transition  Outcome: Adequate for Discharge     Problem: Suicidal Behavior  Goal: Suicidal Behavior is Absent or Managed  1/26/2022 1842 by Davilmar, Claude, RN  Outcome: Adequate for Discharge   Pt is discharged as ordered with all of his belongings at 2105 via [place] transport.  When the writer asks pt if he as any reservation regarding his discharge pt answer by no.   This evening pt comes out for dinner and went back to bed immediately after he finishes eating. Patient denies Covid symptoms and all other psych symptoms. Pt takes himself to the bathroom. There has been no behavior problem. He denies having any complaint. His nurse call button within reach.

## 2022-01-27 NOTE — PHARMACY-ADMISSION MEDICATION HISTORY
Please see Admission Medication History notes completed on 1/18/21 and 7/1/21 under previous encounters for information regarding prior to admission medications.    Deuce Montague, PharmD, BCPS  [facility]: Ascom *[id number]

## 2022-01-27 NOTE — PLAN OF CARE
BEHAVIORAL TEAM DISCUSSION     Participants: Dr. Minal SANCHEZ, Ghazala HERNANDEZ [facility], (CTC) , JOSHUA (RN)  Luma (RN); COLETTE Luu [facility]; Wen WORTHY, RN;   Progress: New Patient   Anticipated length of stay: Unknown   Continued Stay Criteria/Rationale: Psychiatry assessment , medication evaluation and stabilization   Medical/Physical: See medical  Precautions:        Behavioral Orders   Procedures     Code 1 - Restrict to Unit     Routine Programming       As clinically indicated     Status 15       Every 15 minutes.      Patient will have psychiatric assessment and medication management by the psychiatrist. Medications will be reviewed and adjusted per MD as indicated. The treatment team will continue to assess and stabilize the patient's mental health symptoms with the use of medications and therapeutic programming. Hospital staff will provide a safe environment and a therapeutic milieu. Staff will continue to assess patient as needed. Patient will participate in unit groups and activities. Patient will receive individual and group support on the unit.      CTC will do individual inpatient treatment planning and after care planning. CTC will discuss options for increasing community supports with the patient. [facility] will coordinate with outpatient providers and will place referrals to ensure appropriate follow up care is in place.

## 2022-01-27 NOTE — PLAN OF CARE
"  Problem: Depression  Goal: Improved Mood  Outcome: No Change   Nursing Assessment    Psychosis (H) [F29]    Admit Date: 1/26/2022    Length of Stay: 1    Patient evaluation continues. Assessed mood,anxiety,thoughts and behavior. Patient is not progressing towards goals. Patient is encouraged to participate in groups and assisted to develop healthy coping skills.  Patient denies auditory or visual hallucinations. /75   Pulse 109   Temp 98.4  F (36.9  C) (Temporal)   Resp 16   Ht 1.778 m (5' 10\")   Wt 67.6 kg (149 lb 1.6 oz)   SpO2 97%   BMI 21.39 kg/m      Mood: \"OK\"    Patient reports depression \" I don't know and smiled. and reports anxiety some    Affect:flat blunted    Sleep: 8 hours last night and napped throughout the day, up for meals and short periods    Appetite: good 75%    SI: denies    HI: denies    SIB: denies      Medication Compliance medication complaint    Group participation:none    ADL's: refuses    Fall risk interventions: proper foot wear, orthostatic B/p    Rinku Score Interventions:none    Discharge planning none    Refer to daily team meeting notes for individualized plan of care. Nursing will continue to assess.    *Scale is 1-10 and 10 is the worst.         "

## 2022-01-27 NOTE — PLAN OF CARE
Problem: General Plan of Care (Inpatient Behavioral)  Goal: Individualization/Patient Specific Goal (IP Behavioral)  Description: The patient and/or their representative will achieve their patient-specific goals related to the plan of care.    The patient-specific goals include:  Outcome: Reduction in psychosis and delusion    Flowsheets (Taken 1/27/2022 )  Areas of Vulnerability: Poor insight into mental illness; poor hygiene and functioning in activities of daily living  Patient Strengths: Occasionally listens to recommendations regarding self-care        Reasons you are in the hospital:  Declined to respond     Goals:   Declined to respond

## 2022-01-27 NOTE — PLAN OF CARE
CTC: This patient is on a return after he left the unit for a medical unit due to COVID-19. No new psychosocial assessment is warranted. On approach, pt declined to answer questions.    CTC: Writer has sent information to TCUs in the form of referrals. Still awaiting [facility] START program response.  Will contact Marlin again tomorrow, regarding the guardianship situation for this patient. Pt is committed as mentally ill. He is also Jarvised.

## 2022-01-27 NOTE — PROGRESS NOTES
01/26/22 2142   Patient Belongings   Patient Belongings other (see comments)   Belongings Search Yes   Clothing Search No   Comment patient search completed by two male staff-- Umer GUTIERREZ and Fredi LARRY     Patient Belongings:   One brown paper bag/white patient belonging bag containing-- ripped brown hoodie, plastic bag containing (1 lighter, an electronic poker game, a pack of cigarettes, speedy rewards card, medical device ID card, and reservation ID card), pack of cleansing wipes, comb, aerobika inhaler  Second brown paper bag containing-- incentives barometer, green shorts, four pairs of gray shorts, three pairs of disposable briefs, one pair of gray hospital socks, two snack bars, three snack bags, one fruit cup, one white patient belonging bag containing (assortment of snacks, antiperspirant, moisturizing cream, vaseline, fidget spinner)   Third brown paper bag/white patient belonging bag containing-- court documents, assortment of markers, pencils, and crayons, two foam balls, three containers of play claudette, one comb, deck of cards, amazing putty, nail clippers, one microwave meal, odor eliminator spray, hand lotion (2), barrier paste, cleansing wipes, three books, one book of word searchers, one box of tissues, rainbow toy    Inhaler was given to nurse    Patient belongings did not include phone, money, or ID beyond what was noted in the first bag     A               Admission:  I am responsible for any personal items that are not sent to the safe or pharmacy.  [facility] is not responsible for loss, theft or damage of any property in my possession.    Signature:  _________________________________ Date: _______  Time: _____                                              Staff Signature:  ____________________________ Date: ________  Time: _____      2nd Staff person, if patient is unable/unwilling to sign:    Signature: ________________________________ Date: ________  Time: _____     Discharge:  [facility] has returned  all of my personal belongings:    Signature: _________________________________ Date: ________  Time: _____                                          Staff Signature:  ____________________________ Date: ________  Time: _____
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
## 2022-01-28 PROCEDURE — 250N000013 HC RX MED GY IP 250 OP 250 PS 637: Performed by: PSYCHIATRY & NEUROLOGY

## 2022-01-28 PROCEDURE — 124N000003 HC R&B MH SENIOR/ADOLESCENT

## 2022-01-28 PROCEDURE — 99232 SBSQ HOSP IP/OBS MODERATE 35: CPT | Performed by: PSYCHIATRY & NEUROLOGY

## 2022-01-28 RX ADMIN — HALOPERIDOL 1 MG: 1 TABLET ORAL at 20:17

## 2022-01-28 RX ADMIN — DONEPEZIL HYDROCHLORIDE 10 MG: 10 TABLET ORAL at 20:18

## 2022-01-28 RX ADMIN — POLYETHYLENE GLYCOL 3350 17 G: 17 POWDER, FOR SOLUTION ORAL at 08:45

## 2022-01-28 RX ADMIN — GABAPENTIN 100 MG: 100 CAPSULE ORAL at 08:45

## 2022-01-28 RX ADMIN — POLYETHYLENE GLYCOL 3350 17 G: 17 POWDER, FOR SOLUTION ORAL at 20:19

## 2022-01-28 RX ADMIN — GABAPENTIN 100 MG: 100 CAPSULE ORAL at 16:49

## 2022-01-28 RX ADMIN — ASPIRIN 81 MG: 81 TABLET, COATED ORAL at 08:45

## 2022-01-28 RX ADMIN — SALMETEROL XINAFOATE 1 PUFF: 50 POWDER, METERED ORAL; RESPIRATORY (INHALATION) at 20:20

## 2022-01-28 RX ADMIN — SALMETEROL XINAFOATE 1 PUFF: 50 POWDER, METERED ORAL; RESPIRATORY (INHALATION) at 08:45

## 2022-01-28 RX ADMIN — ATORVASTATIN CALCIUM 80 MG: 80 TABLET, FILM COATED ORAL at 20:18

## 2022-01-28 RX ADMIN — LEVOTHYROXINE SODIUM 88 MCG: 0.09 TABLET ORAL at 08:45

## 2022-01-28 RX ADMIN — MIRTAZAPINE 15 MG: 15 TABLET, FILM COATED ORAL at 20:18

## 2022-01-28 RX ADMIN — MEGESTROL ACETATE 20 MG: 20 TABLET ORAL at 08:45

## 2022-01-28 RX ADMIN — MEMANTINE 10 MG: 10 TABLET ORAL at 08:45

## 2022-01-28 RX ADMIN — BUSPIRONE HYDROCHLORIDE 30 MG: 15 TABLET ORAL at 20:18

## 2022-01-28 RX ADMIN — CLOZAPINE 200 MG: 100 TABLET ORAL at 20:17

## 2022-01-28 RX ADMIN — GABAPENTIN 100 MG: 100 CAPSULE ORAL at 13:06

## 2022-01-28 RX ADMIN — MEMANTINE 10 MG: 10 TABLET ORAL at 20:20

## 2022-01-28 RX ADMIN — BUSPIRONE HYDROCHLORIDE 30 MG: 15 TABLET ORAL at 08:45

## 2022-01-28 ASSESSMENT — ACTIVITIES OF DAILY LIVING (ADL)
HYGIENE/GROOMING: INDEPENDENT;PROMPTS
LAUNDRY: UNABLE TO COMPLETE
ORAL_HYGIENE: PROMPTS;INDEPENDENT
HYGIENE/GROOMING: INDEPENDENT;PROMPTS
DRESS: INDEPENDENT

## 2022-01-28 NOTE — PLAN OF CARE
Patient has spent most of the shift in his room. He did come to the dining room for dinner and snacks. His affect is flat. His mood is calm. He denies all MH symptoms. He is not social with peers. He is pleasant and cooperative. He did not attend groups. He denies pain. He ate 100% of his dinner. His BP's were soft and he stated he has not been drinking. Fluids were encouraged. Fluid intake for this shift was 880cc. He is oriented to person only. He presents as quiet and mumbles when he speaks. He is medication compliant. He voices not concerns.

## 2022-01-28 NOTE — PROGRESS NOTES
[facility], [place]   Psychiatric Progress Note  Hospital Day: 1        Interim History:   The patient's care was discussed with the treatment team during the daily team meeting and/or staff's chart notes were reviewed.  Staff report patient spent most of the evening shift in his room. He did come to the dining room for dinner and snacks. His affect is flat. His mood is calm. He denies all MH symptoms. He is not social with peers. He is pleasant and cooperative. He did not attend groups. He denies pain. He ate 100% of his dinner. His BP's were soft and he stated he has not been drinking. Fluids were encouraged. Fluid intake for evening shift was 880cc. He is oriented to person only. He presents as quiet and mumbles when he speaks. He is medication compliant. He voices no concerns. No behavioral issues overnight, including violent or aggressive behaviors. Patient did not require seclusion or restraints. Patient is not exhibiting signs/sx of psychosis or mariusz. Patient is medication adherent. Patient is not attending groups. Patient is sleeping well. Patient is eating adequately. Patient is not attending to ADLs.    Upon interview, the patient was lying in bed, awake and alert. He said that it is August, 2099. He again believed that the current season is spring. He did know current president. He said that he has a safe place to discharge to after explaining reasons for ongoing hospitalization. When asked where, he said that he has multiple mansions in [place].     Suicidal ideation: denies current or recent suicidal ideation or behaviors.    Homicidal ideation: denies current or recent homicidal ideation or behaviors.    Psychotic symptoms:  Patient denies AH, VH, paranoia, delusions.     Medication side effects reported: No significant side effects.    Acute medical concerns: none    Other issues reported by patient: Patient had no further questions or concerns.           Medications:  "      aspirin  81 mg Oral Daily     atorvastatin  80 mg Oral At Bedtime     busPIRone HCl  30 mg Oral BID     cloZAPine  200 mg Oral At Bedtime     donepezil  10 mg Oral At Bedtime     gabapentin  100 mg Oral TID w/meals     haloperidol  1 mg Oral At Bedtime     levothyroxine  88 mcg Oral Daily     megestrol  20 mg Oral Daily     memantine  10 mg Oral BID     mirtazapine  15 mg Oral At Bedtime     polyethylene glycol  17 g Oral BID     salmeterol  1 puff Inhalation BID          Allergies:     Allergies   Allergen Reactions     Ibuprofen      Latex           Labs:   No results found for this or any previous visit (from the past 24 hour(s)).       Psychiatric Examination:     /72 (BP Location: Right arm, Patient Position: Sitting)   Pulse 101   Temp 97.3  F (36.3  C) (Oral)   Resp 16   Ht 1.778 m (5' 10\")   Wt 67.6 kg (149 lb 1.6 oz)   SpO2 98%   BMI 21.39 kg/m    Weight is 149 lbs 1.6 oz  Body mass index is 21.39 kg/m .    Weight over time:  Vitals:    01/26/22 2153 01/27/22 0922   Weight: 72.8 kg (160 lb 9.6 oz) 67.6 kg (149 lb 1.6 oz)       Orthostatic Vitals  Report      Most Recent      Sitting Orthostatic BP 92/65 01/27 0922    Sitting Orthostatic Pulse (bpm) 101 01/27 0922            Cardiometabolic risk assessment. 01/27/22      Reviewed patient profile for cardiometabolic risk factors    Date taken /Value  REFERENCE RANGE   Abdominal Obesity  (Waist Circumference)   See nursing flowsheet Women ?35 in (88 cm)   Men ?40 in (102 cm)      Triglycerides  Triglycerides   Date Value Ref Range Status   07/02/2021 116 <150 mg/dL Final       ?150 mg/dL (1.7 mmol/L) or current treatment for elevated triglycerides   HDL cholesterol  HDL Cholesterol   Date Value Ref Range Status   07/02/2021 27 (L) >39 mg/dL Final   ]   Women <50 mg/dL (1.3 mmol/L) in women or current treatment for low HDL cholesterol  Men <40 mg/dL (1 mmol/L) in men or current treatment for low HDL cholesterol     Fasting plasma glucose " "(FPG) Lab Results   Component Value Date     01/21/2022     01/21/2022    GLC 84 07/01/2021      FPG ?100 mg/dL (5.6 mmol/L) or treatment for elevated blood glucose   Blood pressure  BP Readings from Last 3 Encounters:   01/27/22 100/72   01/25/22 (!) 85/54   01/18/22 103/80    Blood pressure ?130/85 mmHg or treatment for elevated blood pressure   Family History  See family history     Appearance: awake, alert, lying in bed  Attitude: slightly irritable  Eye Contact: intense at times  Mood:  \"okay\"  Affect: mood congruent and intensity heightened  Speech:  clear, coherent and normal prosody  Language: fluent in English  Psychomotor Behavior:  no evidence of tardive dyskinesia, dystonia, or tics  Gait/Station: did not assess though walker was present in his room, ambulates with walker  Thought Process: concrete, disorganized, illogical, goal oriented  Associations:  no loose associations  Thought Content:  Denying SI/HI/AVH; no evidence of psychotic thinking  Insight:  poor  Judgement: poor  Oriented to:  self only  Attention Span and Concentration:  limited  Recent and Remote Memory: limited  Fund of Knowledge: low       Clinical Global Impressions  First:  Considering your total clinical experience with this particular patient population, how severe are the patient's symptoms at this time?: 6 (01/27/22 2157)  Compared to the patient's condition at the START of treatment, this patient's condition is: 3 (01/27/22 2157)  Most recent:  Considering your total clinical experience with this particular patient population, how severe are the patient's symptoms at this time?: 6 (01/27/22 2157)  Compared to the patient's condition at the START of treatment, this patient's condition is: 3 (01/27/22 2157)           Precautions:     Behavioral Orders   Procedures     Code 1 - Restrict to Unit     Routine Programming     As clinically indicated     Status 15     Every 15 minutes.          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  He was hospitalized at [facility] for approximately one year and then transferred to station 3B mental health unit for ECT treatment where he was hospitalized 6/30/21 - 1/13/22 until it was discovered that he was COVID-19 positive. He was subsequently transferred to medicine service until 1/19/22 and then discharged to [facility] COVID-19 unit until medically stable. He was readmitted to geriatric unit, station 3B, on 1/26/22. He is currently under commitment with Yanez being amended to include Clozaril.   Patient has been on Zyprexa, Haldol, Risperdal that were not effective. He is confused, disoriented and endorses visual hallucinations, however he denied auditory hallucinations. He is oriented to self only. Patient is unable to take care of himself in the community and the plan will be for the patient to have a guardian moving forward. Inpatient psychiatric hospitalization is warranted at this time for safety, stabilization, and possible adjustment in medications.     Target psychiatric symptoms and interventions:  Continue PTA medications, including:     BuSpar 30 mg 2 times daily  Clozaril 200 mg at bedtime  Aricept 10 mg at bedtime  Gabapentin 100 mg 3 times a day  Haldol 1 mg at bedtime  Namenda 10 mg 2 times a day  Remeron 15 mg at bedtime     Continue hydroxyzine 25 mg q4h prn for acute anxiety  Continue Trazodone 50 mg at bedtime prn for sleep disturbances  Continue risperidone 1 mg q6h prn for severe agitation     Risks, benefits, and  alternatives discussed at length with patient.      Medical Problems and Treatments:  Soft pressures and tachycardia  - Hold parameters for clozapine placed  - Encourage fluids  - Consider splitting clozapine dose if pressures remain soft   - Consider formal IM consult if pt is symptomatic.      Behavioral/Psychological/Social:  - Encourage unit programming     Safety:  - Safety precautions include: None  - Continue precautions as noted above  - Status 15 minute checks     Legal Status: full commitment and pittman     Disposition Plan:     Reason for ongoing admission: poses an imminent risk to self and is unable to care for self due to severe psychosis or mariusz  Discharge location: skilled nursing facility  Discharge Medications: not ordered  Follow-up Appointments: not scheduled    Risks, benefits, and alternatives discussed at length with patient.     Entered by: Belen Fontaine on 1/28/2022 at 9:58 PM

## 2022-01-28 NOTE — PLAN OF CARE
"  Problem: Behavior Regulation Impairment (Psychotic Signs/Symptoms)  Goal: Improved Behavioral Control (Psychotic Signs/Symptoms)  Outcome: Improving   Nursing Assessment    Psychosis (H) [F29]    Admit Date: 1/26/2022    Length of Stay: 2    Patient evaluation continues. Assessed mood,anxiety,thoughts and behavior. Patient is  progressing towards goals. Patient is encouraged to participate in groups and assisted to develop healthy coping skills.  Patient denies auditory or visual hallucinations. /68 (BP Location: Left arm, Patient Position: Sitting)   Pulse 88   Temp 97.6  F (36.4  C) (Oral)   Resp 16   Ht 1.778 m (5' 10\")   Wt 67.6 kg (149 lb 1.6 oz)   SpO2 98%   BMI 21.39 kg/m      Mood: good    Patient reports depression none and reports anxiety none    Affect:flat blunted    Sleep: 11 hours on night shift    Appetite: good    SI: denies    HI: denies    SIB: denies      Medication Compliance : denies    Group participation: no    ADL's: refuses    Fall risk interventions: proper foot wear    Rinku Score Interventions: none    Discharge planning in process    Refer to daily team meeting notes for individualized plan of care. Nursing will continue to assess.    *Scale is 1-10 and 10 is the worst.         "

## 2022-01-28 NOTE — PROGRESS NOTES
"CLINICAL NUTRITION SERVICES - ASSESSMENT NOTE     Nutrition Prescription    RECOMMENDATIONS FOR MDs/PROVIDERS TO ORDER:  None    Malnutrition Status:    Patients meets criteria for moderate malnutrition   in the context of acute on chronic illness.     Recommendations already ordered by Registered Dietitian (RD):  Changed diet to mechanical/dental soft    Future/Additional Recommendations:  Progress toward goals will be monitored and evaluated per protocol.     REASON FOR ASSESSMENT  John Juárez is a/an 56 year old male assessed by the dietitian for Admission Nutrition Risk Screen.    NUTRITION HISTORY  This patient is a 56 year old male with history of chronic schizophrenia, COPD, cardiomyopathy, endocarditis s/p bioprosthetic valves (2015), CVA, HLD, hypothyroidism, and TBI.  He was admitted here 6/30/21-1/13/2022 and then discovered to have COVID-19.  He was transferred to medicine, then to [facility].  Dieititian notes at [facility] reveal poor intake while in recovery there.  Has been readmitted here on 1/26.  Pt unable to provide accurate history or report of what has helped increase his intake.  Pt did affirm interest in receiving Ensure.      CURRENT NUTRITION ORDERS  Diet: Changed to Mechanical/Dental Soft now  Intake/Tolerance:% frequent small meals.     LABS  Labs reviewed    MEDICATIONS  Medications reviewed    ANTHROPOMETRICS  Height: 177.8 cm (5' 10\")  Most Recent Weight: 67.6 kg (149 lb 1.6 oz)    IBW: 73 kg  BMI: Normal BMI  Weight History:   Wt Readings from Last 10 Encounters:   01/27/22 67.6 kg (149 lb 1.6 oz)   01/09/22 74.1 kg (163 lb 4.8 oz)   03/31/21 84.2 kg (185 lb 9.6 oz)   08/26/20 70.3 kg (155 lb)   07/11/20 69.9 kg (154 lb)   06/27/20 70.3 kg (155 lb)   02/04/20 67.6 kg (149 lb)   01/20/20 69.2 kg (152 lb 9 oz)   09/03/18 78 kg (172 lb)   ]  Dosing Weight: 70 kg    ASSESSED NUTRITION NEEDS  Estimated Energy Needs: 9616-3293 kcals/day (25 - 30 kcals/kg)  Justification: " Maintenance  Estimated Protein Needs: 70-84 grams protein/day (1 - 1.2 grams of pro/kg)  Justification: Maintenance  Estimated Fluid Needs: 1ml/kcal    Justification: Maintenance    PHYSICAL FINDINGS  See malnutrition section below.    MALNUTRITION  % Weight Loss: Up to 5% in 1 month (moderate malnutrition)   % Intake: Unable to determine  Subcutaneous Fat Loss: Unable to assess   Muscle Loss: Unable to assess   Fluid Retention: Unable to assess   Malnutrition: Patients meets criteria for moderate malnutrition   in the context of acute on chronic illness.     NUTRITION DIAGNOSIS  Predicted inadequate nutrient intake related to AMS, dentition and weakness post COVID.      INTERVENTIONS  Implementation  Ensure Enlive with meals.  Change diet consistency to mechanical/dental soft.    Goals  Patient to consume % of nutritionally adequate meal trays TID, or the equivalent with supplements/snacks.     Monitoring/Evaluation  Progress toward goals will be monitored and evaluated per protocol.    Yola Montague, MPH, RDN, LD, [facility]  Pager: [phone]

## 2022-01-28 NOTE — PLAN OF CARE
CTC: Referral sent to [facility]  Referrals to attn: :Liz. Fax: [phone], Phone: [phone].  Writer contacted pt's  and [facility] staff and updated them. Requested feedback.  : Kurtis Chapin - (direct)[phone] (fax) [phone] (cell) [phone]  [facility] - Rachel Orozco -  [phone] (, [facility])  Current referral is for START.

## 2022-01-29 PROCEDURE — 250N000013 HC RX MED GY IP 250 OP 250 PS 637: Performed by: PSYCHIATRY & NEUROLOGY

## 2022-01-29 PROCEDURE — 124N000003 HC R&B MH SENIOR/ADOLESCENT

## 2022-01-29 RX ADMIN — SALMETEROL XINAFOATE 1 PUFF: 50 POWDER, METERED ORAL; RESPIRATORY (INHALATION) at 20:16

## 2022-01-29 RX ADMIN — POLYETHYLENE GLYCOL 3350 17 G: 17 POWDER, FOR SOLUTION ORAL at 09:02

## 2022-01-29 RX ADMIN — MEMANTINE 10 MG: 10 TABLET ORAL at 09:02

## 2022-01-29 RX ADMIN — ATORVASTATIN CALCIUM 80 MG: 80 TABLET, FILM COATED ORAL at 20:17

## 2022-01-29 RX ADMIN — GABAPENTIN 100 MG: 100 CAPSULE ORAL at 12:23

## 2022-01-29 RX ADMIN — DONEPEZIL HYDROCHLORIDE 10 MG: 10 TABLET ORAL at 20:17

## 2022-01-29 RX ADMIN — BUSPIRONE HYDROCHLORIDE 30 MG: 15 TABLET ORAL at 09:02

## 2022-01-29 RX ADMIN — MEMANTINE 10 MG: 10 TABLET ORAL at 20:17

## 2022-01-29 RX ADMIN — GABAPENTIN 100 MG: 100 CAPSULE ORAL at 09:02

## 2022-01-29 RX ADMIN — POLYETHYLENE GLYCOL 3350 17 G: 17 POWDER, FOR SOLUTION ORAL at 20:16

## 2022-01-29 RX ADMIN — GABAPENTIN 100 MG: 100 CAPSULE ORAL at 17:56

## 2022-01-29 RX ADMIN — MIRTAZAPINE 15 MG: 15 TABLET, FILM COATED ORAL at 20:17

## 2022-01-29 RX ADMIN — ASPIRIN 81 MG: 81 TABLET, COATED ORAL at 09:02

## 2022-01-29 RX ADMIN — MEGESTROL ACETATE 20 MG: 20 TABLET ORAL at 09:02

## 2022-01-29 RX ADMIN — CLOZAPINE 200 MG: 100 TABLET ORAL at 20:16

## 2022-01-29 RX ADMIN — LEVOTHYROXINE SODIUM 88 MCG: 0.09 TABLET ORAL at 09:02

## 2022-01-29 RX ADMIN — HALOPERIDOL 1 MG: 1 TABLET ORAL at 20:17

## 2022-01-29 RX ADMIN — BUSPIRONE HYDROCHLORIDE 30 MG: 15 TABLET ORAL at 20:17

## 2022-01-29 ASSESSMENT — ACTIVITIES OF DAILY LIVING (ADL)
ORAL_HYGIENE: PROMPTS;INDEPENDENT
HYGIENE/GROOMING: PROMPTS;INDEPENDENT
DRESS: INDEPENDENT;SCRUBS (BEHAVIORAL HEALTH);PROMPTS
LAUNDRY: UNABLE TO COMPLETE
HYGIENE/GROOMING: INDEPENDENT;PROMPTS
DRESS: SCRUBS (BEHAVIORAL HEALTH);INDEPENDENT
LAUNDRY: UNABLE TO COMPLETE
ORAL_HYGIENE: INDEPENDENT;PROMPTS

## 2022-01-29 NOTE — PLAN OF CARE
Patient has spent the shift in his room. He is isolative and withdrawn. He only talks when spoke to. His affect is flat. His mood is calm. He denies all MH symptoms. He ate 75% of his dinner which he also ate in his room. He refused to shower and attend groups. He is not social with peers and staff. His BP was soft. He was encouraged to drink fluids which he complied-see intake. He is disheveled and unkept. He is medication compliant. He denies pain. He voices no concerns.

## 2022-01-29 NOTE — PLAN OF CARE
Problem: Behavioral Health Plan of Care  Goal: Plan of Care Review  Recent Flowsheet Documentation  Taken 1/29/2022 0888 by Izzy Gallego RN  Plan of Care Reviewed With: patient  Patient Agreement with Plan of Care: agrees     Problem: Behavior Regulation Impairment (Psychotic Signs/Symptoms)  Goal: Improved Behavioral Control (Psychotic Signs/Symptoms)  Outcome: Improving  Flowsheets (Taken 1/29/2022 1350)  Mutually Determined Action Steps (Improved Behavioral Control): other (see comments)  Note: Calm and cooperative no psychotic s/s     Patient condition remains without change. Alert and oriented to self. Visible in the milieu for short period at lunch time. Did not attend group activities. Calm and cooperative. Complaint with medications.

## 2022-01-29 NOTE — PLAN OF CARE
Patient slept well the whole night for 1 1 hours the whole night.. Nothing unusual noted. No complaints made.

## 2022-01-30 PROCEDURE — 250N000013 HC RX MED GY IP 250 OP 250 PS 637: Performed by: PSYCHIATRY & NEUROLOGY

## 2022-01-30 PROCEDURE — 124N000003 HC R&B MH SENIOR/ADOLESCENT

## 2022-01-30 RX ADMIN — BUSPIRONE HYDROCHLORIDE 30 MG: 15 TABLET ORAL at 20:35

## 2022-01-30 RX ADMIN — HALOPERIDOL 1 MG: 1 TABLET ORAL at 20:34

## 2022-01-30 RX ADMIN — ASPIRIN 81 MG: 81 TABLET, COATED ORAL at 09:22

## 2022-01-30 RX ADMIN — LEVOTHYROXINE SODIUM 88 MCG: 0.09 TABLET ORAL at 09:22

## 2022-01-30 RX ADMIN — MIRTAZAPINE 15 MG: 15 TABLET, FILM COATED ORAL at 20:35

## 2022-01-30 RX ADMIN — BUSPIRONE HYDROCHLORIDE 30 MG: 15 TABLET ORAL at 09:23

## 2022-01-30 RX ADMIN — MEGESTROL ACETATE 20 MG: 20 TABLET ORAL at 09:22

## 2022-01-30 RX ADMIN — ATORVASTATIN CALCIUM 80 MG: 80 TABLET, FILM COATED ORAL at 20:34

## 2022-01-30 RX ADMIN — SALMETEROL XINAFOATE 1 PUFF: 50 POWDER, METERED ORAL; RESPIRATORY (INHALATION) at 09:23

## 2022-01-30 RX ADMIN — MEMANTINE 10 MG: 10 TABLET ORAL at 09:21

## 2022-01-30 RX ADMIN — GABAPENTIN 100 MG: 100 CAPSULE ORAL at 13:05

## 2022-01-30 RX ADMIN — CLOZAPINE 200 MG: 100 TABLET ORAL at 20:34

## 2022-01-30 RX ADMIN — MEMANTINE 10 MG: 10 TABLET ORAL at 20:34

## 2022-01-30 RX ADMIN — DONEPEZIL HYDROCHLORIDE 10 MG: 10 TABLET ORAL at 20:35

## 2022-01-30 RX ADMIN — POLYETHYLENE GLYCOL 3350 17 G: 17 POWDER, FOR SOLUTION ORAL at 20:35

## 2022-01-30 RX ADMIN — SALMETEROL XINAFOATE 1 PUFF: 50 POWDER, METERED ORAL; RESPIRATORY (INHALATION) at 20:34

## 2022-01-30 RX ADMIN — POLYETHYLENE GLYCOL 3350 17 G: 17 POWDER, FOR SOLUTION ORAL at 09:22

## 2022-01-30 RX ADMIN — GABAPENTIN 100 MG: 100 CAPSULE ORAL at 09:22

## 2022-01-30 RX ADMIN — GABAPENTIN 100 MG: 100 CAPSULE ORAL at 16:59

## 2022-01-30 ASSESSMENT — ACTIVITIES OF DAILY LIVING (ADL)
HYGIENE/GROOMING: INDEPENDENT;PROMPTS
DRESS: SCRUBS (BEHAVIORAL HEALTH);INDEPENDENT
ORAL_HYGIENE: PROMPTS;INDEPENDENT
LAUNDRY: UNABLE TO COMPLETE

## 2022-01-30 NOTE — PLAN OF CARE
Patient has remained in his room all shift. He did not come out for dinner. He ate 50%. His fluid intake is 640 ml with encouragement. His BP remains soft. He denies SI and SIB. He endorses anxiety and depression but would not elaborate. His appearance is untidy and disheveled but refused to take a shower when offered. He denies pain. He refused to attend groups. He is not social with peers or staff. He is medication compliant.

## 2022-01-30 NOTE — PLAN OF CARE
John isolated in his room for the entire shift. Writer attempted to get pt to come to the [facility] to get his breakfast but he would not come out,. Writer took his breakfast to him and he ended up eating 50%. He was medication compliant. Pt would not come out for lunch either. His lunch was brought to him and he ate 50%. During check in pt denies SI, SIB, AH, VH, and HI. Will continue to monitor.

## 2022-01-31 PROCEDURE — 124N000003 HC R&B MH SENIOR/ADOLESCENT

## 2022-01-31 PROCEDURE — 250N000013 HC RX MED GY IP 250 OP 250 PS 637: Performed by: PSYCHIATRY & NEUROLOGY

## 2022-01-31 PROCEDURE — 99233 SBSQ HOSP IP/OBS HIGH 50: CPT | Mod: GT | Performed by: PSYCHIATRY & NEUROLOGY

## 2022-01-31 RX ADMIN — SALMETEROL XINAFOATE 1 PUFF: 50 POWDER, METERED ORAL; RESPIRATORY (INHALATION) at 08:16

## 2022-01-31 RX ADMIN — GABAPENTIN 100 MG: 100 CAPSULE ORAL at 17:34

## 2022-01-31 RX ADMIN — ATORVASTATIN CALCIUM 80 MG: 80 TABLET, FILM COATED ORAL at 20:26

## 2022-01-31 RX ADMIN — MEGESTROL ACETATE 20 MG: 20 TABLET ORAL at 08:15

## 2022-01-31 RX ADMIN — HALOPERIDOL 1 MG: 1 TABLET ORAL at 20:26

## 2022-01-31 RX ADMIN — MEMANTINE 10 MG: 10 TABLET ORAL at 20:26

## 2022-01-31 RX ADMIN — SALMETEROL XINAFOATE 1 PUFF: 50 POWDER, METERED ORAL; RESPIRATORY (INHALATION) at 20:27

## 2022-01-31 RX ADMIN — MEMANTINE 10 MG: 10 TABLET ORAL at 08:15

## 2022-01-31 RX ADMIN — DONEPEZIL HYDROCHLORIDE 10 MG: 10 TABLET ORAL at 20:26

## 2022-01-31 RX ADMIN — BUSPIRONE HYDROCHLORIDE 30 MG: 15 TABLET ORAL at 20:25

## 2022-01-31 RX ADMIN — LEVOTHYROXINE SODIUM 88 MCG: 0.09 TABLET ORAL at 08:15

## 2022-01-31 RX ADMIN — MIRTAZAPINE 15 MG: 15 TABLET, FILM COATED ORAL at 20:26

## 2022-01-31 RX ADMIN — POLYETHYLENE GLYCOL 3350 17 G: 17 POWDER, FOR SOLUTION ORAL at 08:14

## 2022-01-31 RX ADMIN — CLOZAPINE 200 MG: 100 TABLET ORAL at 20:25

## 2022-01-31 RX ADMIN — ASPIRIN 81 MG: 81 TABLET, COATED ORAL at 08:14

## 2022-01-31 RX ADMIN — POLYETHYLENE GLYCOL 3350 17 G: 17 POWDER, FOR SOLUTION ORAL at 20:26

## 2022-01-31 RX ADMIN — BUSPIRONE HYDROCHLORIDE 30 MG: 15 TABLET ORAL at 08:16

## 2022-01-31 RX ADMIN — GABAPENTIN 100 MG: 100 CAPSULE ORAL at 11:51

## 2022-01-31 RX ADMIN — GABAPENTIN 100 MG: 100 CAPSULE ORAL at 08:15

## 2022-01-31 ASSESSMENT — ACTIVITIES OF DAILY LIVING (ADL)
ORAL_HYGIENE: PROMPTS
DRESS: PROMPTS;SCRUBS (BEHAVIORAL HEALTH)
HYGIENE/GROOMING: PROMPTS;SHOWER
LAUNDRY: UNABLE TO COMPLETE

## 2022-01-31 NOTE — PROGRESS NOTES
"[facility], [place]   Psychiatric Progress Note        Interim History:   The patient's care was discussed with the treatment team during the daily team meeting and/or staff's chart notes were reviewed.  Staff report patient had a drop in his blood pressure this morning. Is unkempt.     The patient reports that he is \"all right.\" Denies problems with sleep. Reports that he is eating. Denies SI. Denies AH. Says that he will shower \"tomorrow\" but then is agreeable to consider doing it later today.          Medications:       aspirin  81 mg Oral Daily     atorvastatin  80 mg Oral At Bedtime     busPIRone HCl  30 mg Oral BID     cloZAPine  200 mg Oral At Bedtime     donepezil  10 mg Oral At Bedtime     gabapentin  100 mg Oral TID w/meals     haloperidol  1 mg Oral At Bedtime     levothyroxine  88 mcg Oral Daily     megestrol  20 mg Oral Daily     memantine  10 mg Oral BID     mirtazapine  15 mg Oral At Bedtime     polyethylene glycol  17 g Oral BID     salmeterol  1 puff Inhalation BID          Allergies:     Allergies   Allergen Reactions     Ibuprofen      Latex           Labs:   No results found for this or any previous visit (from the past 24 hour(s)).       Psychiatric Examination:     /80   Pulse 117   Temp 97.3  F (36.3  C) (Temporal)   Resp 16   Ht 1.778 m (5' 10\")   Wt 67.6 kg (149 lb 1.6 oz)   SpO2 99%   BMI 21.39 kg/m    Weight is 149 lbs 1.6 oz  Body mass index is 21.39 kg/m .  Orthostatic Vitals  Report      Most Recent      Standing Orthostatic /71 01/31 0819    Standing Orthostatic Pulse (bpm) 116 01/31 0819            Appearance: awake, alert and poorly groomed  Attitude:  cooperative  Eye Contact:  fair  Mood:  \"all right\"  Affect:  intensity is blunted  Speech:  mumbling  Psychomotor Behavior:  no evidence of tardive dyskinesia, dystonia, or tics  Thought Process:  goal oriented  Associations:  no loose associations  Thought Content:  no evidence of " suicidal ideation or homicidal ideation  Insight:  limited  Judgement:  limited  Oriented to:  person only  Attention Span and Concentration:  fair  Recent and Remote Memory:  poor    Clinical Global Impressions  First:  Considering your total clinical experience with this particular patient population, how severe are the patient's symptoms at this time?: 6 (01/27/22 2157)  Compared to the patient's condition at the START of treatment, this patient's condition is: 3 (01/27/22 2157)  Most recent:  Considering your total clinical experience with this particular patient population, how severe are the patient's symptoms at this time?: 6 (01/27/22 2157)  Compared to the patient's condition at the START of treatment, this patient's condition is: 3 (01/27/22 2157)         Precautions:     Behavioral Orders   Procedures     Code 1 - Restrict to Unit     Routine Programming     As clinically indicated     Status 15     Every 15 minutes.          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  He was hospitalized at [facility] for approximately one year and then transferred to station 3B mental health unit for ECT treatment where he was hospitalized 6/30/21 - 1/13/22 until it was discovered that he was COVID-19 positive. He was subsequently transferred to medicine service until 1/19/22 and then discharged to [facility] COVID-19 unit until medically stable. He was readmitted to geriatric unit, station 3B, on 1/26/22. He is currently under commitment with Yanez being amended to include Clozaril.    Patient has been on Zyprexa, Haldol, Risperdal that were not effective. He is confused, disoriented and endorses visual hallucinations, however he denied auditory hallucinations. He is oriented to self only. Patient is unable to take care of himself in the community and the plan will be for the patient to have a guardian moving forward. Inpatient psychiatric hospitalization is warranted at this time for safety, stabilization, and possible adjustment in medications.     Target psychiatric symptoms and interventions:  Continue PTA medications, including:     BuSpar 30 mg 2 times daily  Clozaril 200 mg at bedtime  Aricept 10 mg at bedtime  Gabapentin 100 mg 3 times a day  Haldol 1 mg at bedtime  Namenda 10 mg 2 times a day  Remeron 15 mg at bedtime     Continue hydroxyzine 25 mg q4h prn for acute anxiety  Continue Trazodone 50 mg at bedtime prn for sleep disturbances  Continue risperidone 1 mg q6h prn for severe agitation        Disposition Plan   Reason for ongoing admission: is unable to care for self due to severe psychosis or mariusz  Discharge location: [place]  Discharge Medications: not ordered  Follow-up Appointments: not scheduled  Legal Status: full commitment    Entered by: Wilton Morfin on January 31, 2022 at 12:03 PM       Video-Visit Details    Type of service:  Video Visit    Video Start Time (time video started): 0820    Video End Time (time video stopped): 0825    Originating Location (pt. Location): Other Station 3B    Distant Location (provider location): Provider remote location    Mode of Communication:  Video Conference via [employer]    Physician has received verbal consent for a Video Visit from the patient? Yes      Wilton Morfin MD

## 2022-01-31 NOTE — PLAN OF CARE
"Patient has been isolative to his room. His affect is flat. His mood is calm. He is withdrawn. He denies SI and SIB. He endorses anxiety and depression. He is not social with peers and staff. He did not attend groups. He is disheveled and unkept. He agreed to shower this evening then refused stating \"I'll take one tomorrow morning\". He ate 50% of his dinner. Only drinks fluids when staff encourages him. He denies pain. He is medication compliant.  "

## 2022-01-31 NOTE — PLAN OF CARE
"  Problem: Behavior Regulation Impairment (Psychotic Signs/Symptoms)  Goal: Improved Behavioral Control (Psychotic Signs/Symptoms)  Outcome: No Change     Problem: Cognitive Impairment (Psychotic Signs/Symptoms)  Goal: Optimal Cognitive Function (Psychotic Signs/Symptoms)  Outcome: No Change     Problem: Mood Impairment (Psychotic Signs/Symptoms)  Goal: Improved Mood Symptoms (Psychotic Signs/Symptoms)  Outcome: No Change    Pt continues to be isolative to his room. Pt affect is flat and his eye contact is inconsistent. Pt remains disheveled. Pt did agree and was assisted with a shower. Pt's hair continues to be very matted and staff is unable to comb thru this. Pt was mumbling when writer was bringing him his noon medications. Pt was asked to repeat what he said and his response was \"I'm talking to my mother\".   Pt is oriented to himself and a hospital. Pt continues to believe that he is at [facility].  Pt declined to come out for breakfast but did take approximately 50% of solids at breakfast (scrambled eggs, fried potatoes and oatmeal) and drank 240 ml milk and 240 ml or orange juice. [place] [facility] [place] and ate 100% of his hamburger and  50% of his mashed potatoes and gravy as well as 240 ml of 1% milk, and 120 cranberry juice.   Pt returned to bed promptly.    Pt noted to be hypotensive prior to lunch (93/66 sitting with a apical pulse of 100). After lunch blood pressure improved (106/64 with a pulse of 68).         "

## 2022-01-31 NOTE — DISCHARGE SUMMARY
PSYCHIATRY  DISCHARGE SUMMARY     DATE OF DISCHARGE   01/31/2022           DISCHARGE DIAGNOSIS   Chronic schizophrenia (H)    Patient Active Problem List   Diagnosis     TBI with aggressive behavior     HTN (hypertension)     COPD (chronic obstructive pulmonary disease) (H)     Dementia with behavioral disturbance (H)     Chronic schizophrenia (H)     Smoker     Agitation     Behavior disturbance     Psychosis (H)          REASON FOR ADMISSION   This is a 56 year old male with history of Chronic schizophrenia (H).    This is a 56 year old male with history of Chronic schizophrenia (H).  Current legal status is court hold.  Patient is a 56-year-old  man, he is single with no children, currently homeless, supported by Social Security disability under a full commitment with a Yanez; that was admitted to the unit 2800 after he tested positive while he was in the unit 3 S at [place].     Today patient was seen and evaluated at bedside with  present during the assessment, this was done with the use of telehealth.  During the assessment the patient presented as extremely confused, concrete and unable to provide any meaningful information for this note.  Overall the patient was calm but he had problems tracking information.     According to the patient he is doing alright and has no problems with the medications.  Patient said that he is doing okay with the nurses.  Patient denies having suicidal ideations.  Patient did reported that he is still hearing voices but when I ask about what the voices are telling him he didn't answer the question and kept staring at these writer.  Patient presented with slurred speech at times making it very difficult for us to be able to understand what he was saying.     According to Care Everywhere:   Patient was admitted in January 2020 at [facility] psychiatric inpatient unit.  During that admission the patient was taken off Zyprexa as this medication was  not effective and he was started on Haldol.  Later on he was admitted on September of the same year at [facility] psychiatric inpatient unit and subsequently transferred to [facility] a year later.  Patient has been on Zyprexa, Haldol, Risperdal that were not effective.  Eventually the decision was made to amend the Yanez to either Clozaril that seemed to be working better for the patient.  Patient has been diagnosed with TBI, alcohol use disorder, stroke, subarachnoid hemorrhage, dementia and schizophrenia     The hospitalization has been prolonged since September 2020 as the patient has decompensated and has had significant cognitive impairment.  Patient is unable to take care of himself in the community and the plan will be for the patient to have a guardian moving forward.       HOSPITAL COURSE   Admitted due to aforementioned presentation.  Education regarding diagnostic and treatment options with risks, benefits and alternatives and adequate verbalization of understanding.  Discussed reviewed in further detail, stressors and events leading to presentation.    Admitted on a commitment    Patient was started on a multimodal therapy that included medications.  No medication changes were made during this admission.  Patient was in the unit 2800 as he developed COVID while he was at [facility].  Patient continues to present as very confused, withdrawn and for the most part uncooperative with the assessment.  Patient continues to be confused and having poor understanding of his medical and psychiatric problems.  He has continued to insist that he owns 2 mansions.  At times have been irritable but for the most part he is redirectable.  Unable to answer questions about his safety.  Currently patient is COVID recovered and he will be returning back to [facility].       MENTAL STATUS EXAM   Vitals: BP (!) 85/54 (BP Location: Right arm, Patient Position: Semi-Nobles's)   Pulse 103    "Temp 97.3  F (36.3  C) (Oral)   Resp 18   Ht 1.854 m (6' 1\")   Wt 72.3 kg (159 lb 4.8 oz)   SpO2 96%   BMI 21.02 kg/m      Mental Status:  Appearance:  No apparent distress  Mood: \"I am fine\"  Affect: blunted and calm  was not congruent to speech  Suicidal Ideation: Unable to assess  Homicidal Ideation: Unable to assess  Thought process: concrete and perseverative   Thought content: confused   Fund of Knowledge: Below average  Attention/Concentration: Easily distracted  Language ability: Significantly impaired  Memory: Global memory impairment  Insight:  Poor.  Judgement: Poor  Orientation: Only to person  Psychomotor Behavior: slowed    Muscle Strength and Tone: MuscleStrength: Normal and Atrophy  Gait and Station: Not evaluated       DISCHARGE MEDICATIONS   Discharge Medication Options:   Discharge Medication List as of 1/26/2022  9:08 PM      START taking these medications    Details   bisacodyl (DULCOLAX) 10 MG suppository Place 1 suppository (10 mg) rectally daily as needed for constipation, Disp-1 suppository, R-0, No Print Out         CONTINUE these medications which have CHANGED    Details   polyethylene glycol (MIRALAX) 17 g packet Take 17 g by mouth daily, Disp-1 each, R-0, No Print Out      senna-docusate (SENOKOT-S/PERICOLACE) 8.6-50 MG tablet Take 1 tablet by mouth 2 times daily, Disp-60 tablet, R-0, No Print Out         CONTINUE these medications which have NOT CHANGED    Details   albuterol (PROAIR HFA/PROVENTIL HFA/VENTOLIN HFA) 108 (90 Base) MCG/ACT inhaler Inhale 1-2 puffs into the lungs every 4 hours as needed for shortness of breath / dyspnea or wheezing, Disp-1 Inhaler, R-0, E-PrescribePharmacy may dispense brand covered by insurance (Proair, or proventil or ventolin or generic albuterol inhaler)      aspirin 81 MG EC tablet Take 1 tablet (81 mg) by mouth daily, Disp-30 tablet, R-0, E-Prescribe      atorvastatin (LIPITOR) 80 MG tablet Take 1 tablet (80 mg) by mouth At Bedtime, Disp-30 " tablet, R-0, E-Prescribe      busPIRone (BUSPAR) 30 MG tablet Take 1 tablet (30 mg) by mouth 2 times daily, No Print Out      cloZAPine (CLOZARIL) 200 MG tablet Take 1 tablet (200 mg) by mouth At Bedtime, No Print Out      donepezil (ARICEPT) 10 MG tablet Take 1 tablet (10 mg) by mouth At Bedtime, No Print Out      gabapentin (NEURONTIN) 100 MG capsule Take 1 capsule (100 mg) by mouth 3 times daily (with meals), No Print Out      haloperidol (HALDOL) 1 MG tablet Take 1 tablet (1 mg) by mouth At Bedtime, No Print Out      levothyroxine (SYNTHROID/LEVOTHROID) 88 MCG tablet Take 1 tablet (88 mcg) by mouth daily, Disp-30 tablet, R-0, E-Prescribe      megestrol (MEGACE) 20 MG tablet Take 1 tablet (20 mg) by mouth daily, No Print Out      melatonin 3 MG tablet Take 1 tablet (3 mg) by mouth nightly as needed for sleep, No Print Out      memantine (NAMENDA) 10 MG tablet Take 1 tablet (10 mg) by mouth 2 times daily, No Print Out      mirtazapine (REMERON) 15 MG tablet Take 1 tablet (15 mg) by mouth At Bedtime, No Print Out      risperiDONE (RISPERDAL M-TABS) 1 MG ODT Place 1 tablet (1 mg) under the tongue every 6 hours as needed (agitation), No Print Out      salmeterol (SEREVENT) 50 MCG/DOSE inhaler Inhale 1 puff into the lungs 2 times daily, Disp-1 Inhaler, R-0, E-Prescribe         STOP taking these medications       acetaminophen (TYLENOL) 325 MG tablet Comments:   Reason for Stopping:         alum & mag hydroxide-simethicone (MAALOX) 200-200-20 MG/5ML SUSP suspension Comments:   Reason for Stopping:         benzocaine-menthol (CEPACOL) 15-3.6 MG lozenge Comments:   Reason for Stopping:         enoxaparin ANTICOAGULANT (LOVENOX) 40 MG/0.4ML syringe Comments:   Reason for Stopping:         metoprolol tartrate (LOPRESSOR) 25 MG tablet Comments:   Reason for Stopping:         nicotine (NICORETTE) 2 MG gum Comments:   Reason for Stopping:         polyethylene glycol-propylene glycol PF (SYSTANE ULTRA PF) 0.4-0.3 % SOLN  opthalmic solution Comments:   Reason for Stopping:               Medication adherence issues: MS Med Adherence Y/N: Yes, Hospitalization  Medication side effects: MEDICATION SIDE EFFECTS: no side effects reported         DISCHARGE PLAN   1.  Education given regarding diagnostic and treatment options with risks, benefits and alternatives with adequate verbalization of understanding.  2.  Discharge to return to [facility].  Upon detailed review of risk factors, patient amenable for release.   3.  Continue aforementioned medications and associated medication changes with follow-up by outpatient mental health provider.  4.  Crisis management planning in place.    5.  Continue efforts for sobriety.    .  Nursing and  to review further discharge recommendations.     TOTAL TIME:  Greater than 30 minutes for discharge planning.    This note was created with help of Dragon dictation system. Grammatical / typing errors are not intentional.    Ginger Dubon MD

## 2022-01-31 NOTE — PLAN OF CARE
"Assessment/Intervention/Current Symtoms and Care Coordination  Patient cooperative with care.  Current symptoms include psychosis, poor hygeine.  -Met with pt. He reported \"I am fine.\"  -Informed him information has been sent to some TCUs for consideration. Pt appeared not to understand what this was because he did not provide any feedback.  -Awaiting feedback from TCUs. [place] plan is to wait on START program, which is being coordinated with the [facility].     Discharge Plan or Goal   Currently being formulated due to guardianship difficulties and ongoing stabilization     Barriers to Discharge   Needs continued psychiatric assessment and stabilization.     Referral Status  START program, with the [facility]  TCUs  Legal Status  Commitment and Jarvised. Court Hold  "

## 2022-02-01 PROCEDURE — 124N000003 HC R&B MH SENIOR/ADOLESCENT

## 2022-02-01 PROCEDURE — 99233 SBSQ HOSP IP/OBS HIGH 50: CPT | Mod: GT | Performed by: PSYCHIATRY & NEUROLOGY

## 2022-02-01 PROCEDURE — 250N000013 HC RX MED GY IP 250 OP 250 PS 637: Performed by: PSYCHIATRY & NEUROLOGY

## 2022-02-01 RX ADMIN — SALMETEROL XINAFOATE 1 PUFF: 50 POWDER, METERED ORAL; RESPIRATORY (INHALATION) at 20:05

## 2022-02-01 RX ADMIN — MEMANTINE 10 MG: 10 TABLET ORAL at 20:05

## 2022-02-01 RX ADMIN — MIRTAZAPINE 15 MG: 15 TABLET, FILM COATED ORAL at 20:06

## 2022-02-01 RX ADMIN — LEVOTHYROXINE SODIUM 88 MCG: 0.09 TABLET ORAL at 07:58

## 2022-02-01 RX ADMIN — POLYETHYLENE GLYCOL 3350 17 G: 17 POWDER, FOR SOLUTION ORAL at 07:58

## 2022-02-01 RX ADMIN — CLOZAPINE 200 MG: 100 TABLET ORAL at 20:05

## 2022-02-01 RX ADMIN — GABAPENTIN 100 MG: 100 CAPSULE ORAL at 12:21

## 2022-02-01 RX ADMIN — BUSPIRONE HYDROCHLORIDE 30 MG: 15 TABLET ORAL at 07:58

## 2022-02-01 RX ADMIN — MEMANTINE 10 MG: 10 TABLET ORAL at 07:58

## 2022-02-01 RX ADMIN — MEGESTROL ACETATE 20 MG: 20 TABLET ORAL at 07:58

## 2022-02-01 RX ADMIN — SALMETEROL XINAFOATE 1 PUFF: 50 POWDER, METERED ORAL; RESPIRATORY (INHALATION) at 08:04

## 2022-02-01 RX ADMIN — GABAPENTIN 100 MG: 100 CAPSULE ORAL at 07:58

## 2022-02-01 RX ADMIN — POLYETHYLENE GLYCOL 3350 17 G: 17 POWDER, FOR SOLUTION ORAL at 20:06

## 2022-02-01 RX ADMIN — HALOPERIDOL 1 MG: 1 TABLET ORAL at 20:05

## 2022-02-01 RX ADMIN — ASPIRIN 81 MG: 81 TABLET, COATED ORAL at 07:58

## 2022-02-01 RX ADMIN — GABAPENTIN 100 MG: 100 CAPSULE ORAL at 17:32

## 2022-02-01 RX ADMIN — BUSPIRONE HYDROCHLORIDE 30 MG: 15 TABLET ORAL at 20:06

## 2022-02-01 RX ADMIN — ATORVASTATIN CALCIUM 80 MG: 80 TABLET, FILM COATED ORAL at 20:06

## 2022-02-01 RX ADMIN — DONEPEZIL HYDROCHLORIDE 10 MG: 10 TABLET ORAL at 20:05

## 2022-02-01 ASSESSMENT — ACTIVITIES OF DAILY LIVING (ADL)
ORAL_HYGIENE: PROMPTS
HYGIENE/GROOMING: PROMPTS
LAUNDRY: UNABLE TO COMPLETE
DRESS: PROMPTS

## 2022-02-01 NOTE — PLAN OF CARE
Patient slept well the whole night for 9.5 hours. Nothing unusual noted. No concerns raised throughout the shhift.

## 2022-02-01 NOTE — PLAN OF CARE
Assessment/Intervention/Current Symtoms and Care Coordination  Patient cooperative with care.  Current symptoms include psychosis, poor hygeine.  -Met with pt. He reported he is doing okay. Awaiting feedback from  and [facility] regarding START.  - Awaiting feedback from TCUs.         Discharge Plan or Goal   Currently being formulated due to guardianship difficulties and ongoing stabilization     Barriers to Discharge   Needs continued psychiatric assessment and stabilization.     Referral Status  START program, with the [facility]  TCUs  Legal Status  Commitment and Jarvised. Court Hold

## 2022-02-01 NOTE — PROGRESS NOTES
"[facility], [place]   Psychiatric Progress Note        Interim History:   The patient's care was discussed with the treatment team during the daily team meeting and/or staff's chart notes were reviewed.  Staff report patient is flat. Did shower yesterday. Has been referred to TCUs.     The patient reports that he is \"huh.\" Less responsive and engaged today. Declines to answer questions about sleep, appetite or mood. Does not respond to queries about suicidal thinking or hallucinations. Commended for showering yesterday.          Medications:       aspirin  81 mg Oral Daily     atorvastatin  80 mg Oral At Bedtime     busPIRone HCl  30 mg Oral BID     cloZAPine  200 mg Oral At Bedtime     donepezil  10 mg Oral At Bedtime     gabapentin  100 mg Oral TID w/meals     haloperidol  1 mg Oral At Bedtime     levothyroxine  88 mcg Oral Daily     megestrol  20 mg Oral Daily     memantine  10 mg Oral BID     mirtazapine  15 mg Oral At Bedtime     polyethylene glycol  17 g Oral BID     salmeterol  1 puff Inhalation BID          Allergies:     Allergies   Allergen Reactions     Ibuprofen      Latex           Labs:   No results found for this or any previous visit (from the past 24 hour(s)).       Psychiatric Examination:     /78   Pulse 114   Temp 98.6  F (37  C) (Temporal)   Resp 16   Ht 1.778 m (5' 10\")   Wt 67.6 kg (149 lb 1.6 oz)   SpO2 99%   BMI 21.39 kg/m    Weight is 149 lbs 1.6 oz  Body mass index is 21.39 kg/m .  Orthostatic Vitals  Report      Most Recent      Standing Orthostatic /71 01/31 0819    Standing Orthostatic Pulse (bpm) 116 01/31 0819            Appearance: awake, alert and adequately groomed  Attitude:  less cooperative  Eye Contact:  fair  Mood:  \"huh\"  Affect:  intensity is blunted  Speech:  paucity of speech and mumbling  Psychomotor Behavior:  no evidence of tardive dyskinesia, dystonia, or tics  Thought Process:  goal oriented  Associations:  no loose " associations  Thought Content:  no evidence of suicidal ideation or homicidal ideation  Insight:  limited  Judgement:  limited  Oriented to:  person only  Attention Span and Concentration:  fair  Recent and Remote Memory:  poor    Clinical Global Impressions  First:  Considering your total clinical experience with this particular patient population, how severe are the patient's symptoms at this time?: 6 (01/27/22 2157)  Compared to the patient's condition at the START of treatment, this patient's condition is: 3 (01/27/22 2157)  Most recent:  Considering your total clinical experience with this particular patient population, how severe are the patient's symptoms at this time?: 6 (01/27/22 2157)  Compared to the patient's condition at the START of treatment, this patient's condition is: 3 (01/27/22 2157)         Precautions:     Behavioral Orders   Procedures     Code 1 - Restrict to Unit     Routine Programming     As clinically indicated     Status 15     Every 15 minutes.          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  He was hospitalized at [facility] for approximately one year and then transferred to station [facility] mental health unit for ECT treatment where he was hospitalized 6/30/21 - 1/13/22 until it was discovered that he was COVID-19 positive. He was subsequently transferred to medicine service until 1/19/22 and then discharged to [facility] COVID-19 unit until medically stable. He was readmitted to geriatric unit, station 3B, on 1/26/22. He is currently under commitment with  Yanez being amended to include Clozaril.   Patient has been on Zyprexa, Haldol, Risperdal that were not effective. He is confused, disoriented and endorses visual hallucinations, however he denied auditory hallucinations. He is oriented to self only. Patient is unable to take care of himself in the community and the plan will be for the patient to have a guardian moving forward. Inpatient psychiatric hospitalization is warranted at this time for safety, stabilization, and possible adjustment in medications.     Target psychiatric symptoms and interventions:  Continue PTA medications, including:     BuSpar 30 mg 2 times daily  Clozaril 200 mg at bedtime  Aricept 10 mg at bedtime  Gabapentin 100 mg 3 times a day  Haldol 1 mg at bedtime  Namenda 10 mg 2 times a day  Remeron 15 mg at bedtime     Continue hydroxyzine 25 mg q4h prn for acute anxiety  Continue Trazodone 50 mg at bedtime prn for sleep disturbances  Continue risperidone 1 mg q6h prn for severe agitation        Disposition Plan   Reason for ongoing admission: is unable to care for self due to severe psychosis or mariusz  Discharge location: [place]  Discharge Medications: not ordered  Follow-up Appointments: not scheduled  Legal Status: full commitment    Entered by: Wilton Morfin on February 1, 2022 at 10:45 AM       Video-Visit Details    Type of service:  Video Visit    Video Start Time (time video started): 0955    Video End Time (time video stopped): 1000    Originating Location (pt. Location): Other Station 3B    Distant Location (provider location): Provider remote location    Mode of Communication:  Video Conference via [employer]    Physician has received verbal consent for a Video Visit from the patient? Yes      Wilton Morfin MD

## 2022-02-01 NOTE — PLAN OF CARE
Pt denied pain, all psych symptoms, and contracted for safety. Stated mood to be sad because he has been here so long, encouraged to be patient and work with social work for placement. Pt looked sharp and more bright to writer, clothes were clean and he looked clean, had a shower in AM shift.Continues to be oriented only to self, thought that was at [facility], was reoriented, was off on date, thought it was June 2099. Refused to come out for dinner, stated he was dizzy, BP was 107/75, , which was 107 at beginning of shift. HR sumes to run high on and off, encouraged to drink more, had 480 ml of fluids this shift. Pt slept after HS medications. No other concerns noted.

## 2022-02-02 PROCEDURE — 124N000003 HC R&B MH SENIOR/ADOLESCENT

## 2022-02-02 PROCEDURE — 250N000013 HC RX MED GY IP 250 OP 250 PS 637: Performed by: PSYCHIATRY & NEUROLOGY

## 2022-02-02 PROCEDURE — 99233 SBSQ HOSP IP/OBS HIGH 50: CPT | Mod: GT | Performed by: PSYCHIATRY & NEUROLOGY

## 2022-02-02 RX ADMIN — POLYETHYLENE GLYCOL 3350 17 G: 17 POWDER, FOR SOLUTION ORAL at 20:08

## 2022-02-02 RX ADMIN — MIRTAZAPINE 15 MG: 15 TABLET, FILM COATED ORAL at 20:09

## 2022-02-02 RX ADMIN — HALOPERIDOL 1 MG: 1 TABLET ORAL at 20:08

## 2022-02-02 RX ADMIN — ASPIRIN 81 MG: 81 TABLET, COATED ORAL at 08:11

## 2022-02-02 RX ADMIN — MEMANTINE 10 MG: 10 TABLET ORAL at 08:10

## 2022-02-02 RX ADMIN — SALMETEROL XINAFOATE 1 PUFF: 50 POWDER, METERED ORAL; RESPIRATORY (INHALATION) at 20:08

## 2022-02-02 RX ADMIN — MEMANTINE 10 MG: 10 TABLET ORAL at 20:08

## 2022-02-02 RX ADMIN — MEGESTROL ACETATE 20 MG: 20 TABLET ORAL at 08:10

## 2022-02-02 RX ADMIN — GABAPENTIN 100 MG: 100 CAPSULE ORAL at 12:19

## 2022-02-02 RX ADMIN — POLYETHYLENE GLYCOL 3350 17 G: 17 POWDER, FOR SOLUTION ORAL at 08:11

## 2022-02-02 RX ADMIN — BUSPIRONE HYDROCHLORIDE 30 MG: 15 TABLET ORAL at 08:10

## 2022-02-02 RX ADMIN — LEVOTHYROXINE SODIUM 88 MCG: 0.09 TABLET ORAL at 08:11

## 2022-02-02 RX ADMIN — SALMETEROL XINAFOATE 1 PUFF: 50 POWDER, METERED ORAL; RESPIRATORY (INHALATION) at 08:14

## 2022-02-02 RX ADMIN — CLOZAPINE 200 MG: 100 TABLET ORAL at 20:08

## 2022-02-02 RX ADMIN — GABAPENTIN 100 MG: 100 CAPSULE ORAL at 17:03

## 2022-02-02 RX ADMIN — GABAPENTIN 100 MG: 100 CAPSULE ORAL at 08:10

## 2022-02-02 RX ADMIN — DONEPEZIL HYDROCHLORIDE 10 MG: 10 TABLET ORAL at 20:09

## 2022-02-02 RX ADMIN — BUSPIRONE HYDROCHLORIDE 30 MG: 15 TABLET ORAL at 20:08

## 2022-02-02 RX ADMIN — ATORVASTATIN CALCIUM 80 MG: 80 TABLET, FILM COATED ORAL at 20:08

## 2022-02-02 ASSESSMENT — ACTIVITIES OF DAILY LIVING (ADL)
DRESS: SCRUBS (BEHAVIORAL HEALTH)
HYGIENE/GROOMING: PROMPTS;INDEPENDENT
HYGIENE/GROOMING: PROMPTS
ORAL_HYGIENE: PROMPTS
LAUNDRY: UNABLE TO COMPLETE
DRESS: SCRUBS (BEHAVIORAL HEALTH);INDEPENDENT
ORAL_HYGIENE: PROMPTS;INDEPENDENT
LAUNDRY: UNABLE TO COMPLETE

## 2022-02-02 NOTE — PLAN OF CARE
Assessment/Intervention/Current Symtoms and Care Coordination  Patient cooperative with care.  Current symptoms include psychosis, poor hygeine.  -Met with pt. He reported he is doing okay. Awaiting feedback from  and [facility] regarding START.  - Awaiting feedback from TCUs.           Discharge Plan or Goal   Currently being formulated due to guardianship difficulties and ongoing stabilization     Barriers to Discharge   Needs continued psychiatric assessment and stabilization.     Referral Status  START program, with the [facility]  TCUs  Legal Status  Commitment and Jarvised. Court Hold

## 2022-02-02 NOTE — PROGRESS NOTES
"[facility], [place]   Psychiatric Progress Note        Interim History:   The patient's care was discussed with the treatment team during the daily team meeting and/or staff's chart notes were reviewed.  Staff report patient did come out for breakfast and ate some. Was in the lounge last night. Did scream in his room but denies AH. Slept 7.5 hours.     The patient is minimally engaged in the interview today. Does admit to being tired. Doesn't answer questions about SI or AH. Doesn't respond to questions about appetite. Did attempt to meet with patient again and still minimally responsive. Denies AH at that time.          Medications:       aspirin  81 mg Oral Daily     atorvastatin  80 mg Oral At Bedtime     busPIRone HCl  30 mg Oral BID     cloZAPine  200 mg Oral At Bedtime     donepezil  10 mg Oral At Bedtime     gabapentin  100 mg Oral TID w/meals     haloperidol  1 mg Oral At Bedtime     levothyroxine  88 mcg Oral Daily     megestrol  20 mg Oral Daily     memantine  10 mg Oral BID     mirtazapine  15 mg Oral At Bedtime     polyethylene glycol  17 g Oral BID     salmeterol  1 puff Inhalation BID          Allergies:     Allergies   Allergen Reactions     Ibuprofen      Latex           Labs:   No results found for this or any previous visit (from the past 24 hour(s)).       Psychiatric Examination:     /80   Pulse 120   Temp 97.6  F (36.4  C) (Oral)   Resp 16   Ht 1.778 m (5' 10\")   Wt 67.6 kg (149 lb 1.6 oz)   SpO2 98%   BMI 21.39 kg/m    Weight is 149 lbs 1.6 oz  Body mass index is 21.39 kg/m .  Orthostatic Vitals  Report      Most Recent      Standing Orthostatic /71 01/31 0819    Standing Orthostatic Pulse (bpm) 116 01/31 0819            Appearance: awake, alert and adequately groomed  Attitude:  less cooperative  Eye Contact:  fair  Mood:  \"huh\"  Affect:  intensity is blunted  Speech:  paucity of speech  Psychomotor Behavior:  no evidence of tardive dyskinesia, " dystonia, or tics  Thought Process:  goal oriented  Associations:  no loose associations  Thought Content:  no evidence of suicidal ideation or homicidal ideation  Insight:  limited  Judgement:  limited  Oriented to:  person only  Attention Span and Concentration:  fair  Recent and Remote Memory:  poor    Clinical Global Impressions  First:  Considering your total clinical experience with this particular patient population, how severe are the patient's symptoms at this time?: 6 (01/27/22 2157)  Compared to the patient's condition at the START of treatment, this patient's condition is: 3 (01/27/22 2157)  Most recent:  Considering your total clinical experience with this particular patient population, how severe are the patient's symptoms at this time?: 6 (01/27/22 2157)  Compared to the patient's condition at the START of treatment, this patient's condition is: 3 (01/27/22 2157)         Precautions:     Behavioral Orders   Procedures     Code 1 - Restrict to Unit     Routine Programming     As clinically indicated     Status 15     Every 15 minutes.          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  He was hospitalized at [facility] for approximately one year and then transferred to station 3B mental health unit for ECT treatment where he was hospitalized 6/30/21 - 1/13/22 until it was discovered that he was COVID-19 positive. He was subsequently transferred to medicine service until 1/19/22 and then discharged to [facility] COVID-19 unit until medically stable. He was readmitted to  geriatric unit, station 3B, on 1/26/22. He is currently under commitment with Yanez being amended to include Clozaril.   Patient has been on Zyprexa, Haldol, Risperdal that were not effective. He is confused, disoriented and endorses visual hallucinations, however he denied auditory hallucinations. He is oriented to self only. Patient is unable to take care of himself in the community and the plan will be for the patient to have a guardian moving forward. Inpatient psychiatric hospitalization is warranted at this time for safety, stabilization, and possible adjustment in medications.     Target psychiatric symptoms and interventions:  Continue PTA medications, including:     BuSpar 30 mg 2 times daily  Clozaril 200 mg at bedtime  Aricept 10 mg at bedtime  Gabapentin 100 mg 3 times a day  Haldol 1 mg at bedtime  Namenda 10 mg 2 times a day  Remeron 15 mg at bedtime     Continue hydroxyzine 25 mg q4h prn for acute anxiety  Continue Trazodone 50 mg at bedtime prn for sleep disturbances  Continue risperidone 1 mg q6h prn for severe agitation        Disposition Plan   Reason for ongoing admission: is unable to care for self due to severe psychosis or mariusz  Discharge location: [place]  Discharge Medications: not ordered  Follow-up Appointments: not scheduled  Legal Status: full commitment    Entered by: Wilton Morfin on February 2, 2022 at 12:08 PM       Video-Visit Details    Type of service:  Video Visit    Video Start Time (time video started): 0950    Video End Time (time video stopped): 0955    Originating Location (pt. Location): Other Station 3B    Distant Location (provider location): Provider remote location    Mode of Communication:  Video Conference via [employer]    Physician has received verbal consent for a Video Visit from the patient? Yes      Wilton Morfin MD

## 2022-02-02 NOTE — PLAN OF CARE
"  Problem: Decreased Participation and Engagement (Psychotic Signs/Symptoms)  Goal: Increased Participation and Engagement (Psychotic Signs/Symptoms)  Outcome: No Change  Patient continues to be isolated and withdrawn to his room, refuse to participate in evening group and no socializing with peers even after writer encourage him to. Ate about 50% of his meal in the room. No nausea or upset stomach after meal, staff continues to encourage fluid intake.   Patient was screaming in his room once on this shift, when ask what happen patient could not remember why he was screaming about\" I don't know\". Endorses visual hallucination but denies SI/SIB and HI. Remained alert to person only, gait slightly unsteady but no fall and needs prompting with ADL's. Writer encourage patient to shower this shift but he refused. Medication compliant and no cheeking noted.          "

## 2022-02-02 NOTE — PLAN OF CARE
Problem: Behavior Regulation Impairment (Psychotic Signs/Symptoms)  Goal: Improved Behavioral Control (Psychotic Signs/Symptoms)  Outcome: No Change     Problem: Cognitive Impairment (Psychotic Signs/Symptoms)  Goal: Optimal Cognitive Function (Psychotic Signs/Symptoms)  Outcome: No Change  Intervention: Support and Promote Cognitive Ability  Recent Flowsheet Documentation  Taken 2/2/2022 1200 by Juaerz Murphy RN  Trust Relationship/Rapport: care explained     Problem: Decreased Participation and Engagement (Psychotic Signs/Symptoms)  Goal: Increased Participation and Engagement (Psychotic Signs/Symptoms)  Outcome: No Change     Patient is calm cooperative, oriented to self, affect flat and blunted. Medication complaints, denies SI, SIB, and hallucinations. Isolative and withdrawn this shift, did not attend groups but came out for meals. Ate a few bites of breakfast and 50% of lunch. Denied pain, no prns given. No further concerns.

## 2022-02-03 PROCEDURE — 124N000003 HC R&B MH SENIOR/ADOLESCENT

## 2022-02-03 PROCEDURE — 250N000013 HC RX MED GY IP 250 OP 250 PS 637: Performed by: PSYCHIATRY & NEUROLOGY

## 2022-02-03 PROCEDURE — 99233 SBSQ HOSP IP/OBS HIGH 50: CPT | Performed by: PSYCHIATRY & NEUROLOGY

## 2022-02-03 RX ADMIN — POLYETHYLENE GLYCOL 3350 17 G: 17 POWDER, FOR SOLUTION ORAL at 20:20

## 2022-02-03 RX ADMIN — BUSPIRONE HYDROCHLORIDE 30 MG: 15 TABLET ORAL at 20:20

## 2022-02-03 RX ADMIN — DONEPEZIL HYDROCHLORIDE 10 MG: 10 TABLET ORAL at 20:20

## 2022-02-03 RX ADMIN — GABAPENTIN 100 MG: 100 CAPSULE ORAL at 08:34

## 2022-02-03 RX ADMIN — MEMANTINE 10 MG: 10 TABLET ORAL at 20:20

## 2022-02-03 RX ADMIN — MEGESTROL ACETATE 20 MG: 20 TABLET ORAL at 08:34

## 2022-02-03 RX ADMIN — ATORVASTATIN CALCIUM 80 MG: 80 TABLET, FILM COATED ORAL at 20:20

## 2022-02-03 RX ADMIN — SALMETEROL XINAFOATE 1 PUFF: 50 POWDER, METERED ORAL; RESPIRATORY (INHALATION) at 08:35

## 2022-02-03 RX ADMIN — SALMETEROL XINAFOATE 1 PUFF: 50 POWDER, METERED ORAL; RESPIRATORY (INHALATION) at 20:20

## 2022-02-03 RX ADMIN — POLYETHYLENE GLYCOL 3350 17 G: 17 POWDER, FOR SOLUTION ORAL at 08:35

## 2022-02-03 RX ADMIN — GABAPENTIN 100 MG: 100 CAPSULE ORAL at 17:11

## 2022-02-03 RX ADMIN — BUSPIRONE HYDROCHLORIDE 30 MG: 15 TABLET ORAL at 08:34

## 2022-02-03 RX ADMIN — ASPIRIN 81 MG: 81 TABLET, COATED ORAL at 08:34

## 2022-02-03 RX ADMIN — LEVOTHYROXINE SODIUM 88 MCG: 0.09 TABLET ORAL at 08:35

## 2022-02-03 RX ADMIN — GABAPENTIN 100 MG: 100 CAPSULE ORAL at 12:17

## 2022-02-03 RX ADMIN — MEMANTINE 10 MG: 10 TABLET ORAL at 08:34

## 2022-02-03 RX ADMIN — MIRTAZAPINE 15 MG: 15 TABLET, FILM COATED ORAL at 20:20

## 2022-02-03 RX ADMIN — HALOPERIDOL 1 MG: 1 TABLET ORAL at 20:20

## 2022-02-03 RX ADMIN — CLOZAPINE 200 MG: 100 TABLET ORAL at 20:20

## 2022-02-03 ASSESSMENT — ACTIVITIES OF DAILY LIVING (ADL)
ORAL_HYGIENE: INDEPENDENT;PROMPTS
ORAL_HYGIENE: PROMPTS;INDEPENDENT
DRESS: SCRUBS (BEHAVIORAL HEALTH);INDEPENDENT
HYGIENE/GROOMING: PROMPTS;INDEPENDENT
LAUNDRY: UNABLE TO COMPLETE
HYGIENE/GROOMING: INDEPENDENT;PROMPTS
DRESS: SCRUBS (BEHAVIORAL HEALTH)
LAUNDRY: UNABLE TO COMPLETE

## 2022-02-03 NOTE — PROGRESS NOTES
"[facility], [place]   Psychiatric Progress Note        Interim History:   The patient's care was discussed with the treatment team during the daily team meeting and/or staff's chart notes were reviewed.  Staff report patient has been isolative. Did come out for dinner. Was endorsing AH last evening.     The patient is more engaged today. Reports that he is tired. Did sleep well. Says that he is eating well. Denies any SI or HI. Denies any AH today. Asked if he could be given a haircut today and patient declines this.          Medications:       aspirin  81 mg Oral Daily     atorvastatin  80 mg Oral At Bedtime     busPIRone HCl  30 mg Oral BID     cloZAPine  200 mg Oral At Bedtime     donepezil  10 mg Oral At Bedtime     gabapentin  100 mg Oral TID w/meals     haloperidol  1 mg Oral At Bedtime     levothyroxine  88 mcg Oral Daily     megestrol  20 mg Oral Daily     memantine  10 mg Oral BID     mirtazapine  15 mg Oral At Bedtime     polyethylene glycol  17 g Oral BID     salmeterol  1 puff Inhalation BID          Allergies:     Allergies   Allergen Reactions     Ibuprofen      Latex           Labs:   No results found for this or any previous visit (from the past 24 hour(s)).       Psychiatric Examination:     /70 (Patient Position: Supine)   Pulse 110   Temp 98.2  F (36.8  C) (Oral)   Resp 16   Ht 1.778 m (5' 10\")   Wt 67.6 kg (149 lb 1.6 oz)   SpO2 96%   BMI 21.39 kg/m    Weight is 149 lbs 1.6 oz  Body mass index is 21.39 kg/m .  Orthostatic Vitals  Report      Most Recent      Standing Orthostatic /71 01/31 0819    Standing Orthostatic Pulse (bpm) 116 01/31 0819            Appearance: awake, alert and adequately groomed  Attitude:  more cooperative  Eye Contact:  fair  Mood:  \"tired\"  Affect:  intensity is blunted  Speech:  paucity of speech, more verbal  Psychomotor Behavior:  no evidence of tardive dyskinesia, dystonia, or tics  Thought Process:  goal " oriented  Associations:  no loose associations  Thought Content:  no evidence of suicidal ideation or homicidal ideation  Insight:  limited  Judgement:  limited  Oriented to:  person only  Attention Span and Concentration:  fair  Recent and Remote Memory:  poor    Clinical Global Impressions  First:  Considering your total clinical experience with this particular patient population, how severe are the patient's symptoms at this time?: 6 (01/27/22 2157)  Compared to the patient's condition at the START of treatment, this patient's condition is: 3 (01/27/22 2157)  Most recent:  Considering your total clinical experience with this particular patient population, how severe are the patient's symptoms at this time?: 6 (01/27/22 2157)  Compared to the patient's condition at the START of treatment, this patient's condition is: 3 (01/27/22 2157)         Precautions:     Behavioral Orders   Procedures     Code 1 - Restrict to Unit     Routine Programming     As clinically indicated     Status 15     Every 15 minutes.          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  He was hospitalized at [facility]"" for approximately one year and then transferred to station 3B mental health unit for ECT treatment where he was hospitalized 6/30/21 - 1/13/22 until it was discovered that he was COVID-19 positive. He was subsequently transferred to medicine service until 1/19/22 and then discharged to [facility] COVID-19 unit until medically stable. He was readmitted to geriatric unit, station 3B, on 1/26/22. He is  currently under commitment with Yanez being amended to include Clozaril.   Patient has been on Zyprexa, Haldol, Risperdal that were not effective. He is confused, disoriented and endorses visual hallucinations, however he denied auditory hallucinations. He is oriented to self only. Patient is unable to take care of himself in the community and the plan will be for the patient to have a guardian moving forward. Inpatient psychiatric hospitalization is warranted at this time for safety, stabilization, and possible adjustment in medications.     Target psychiatric symptoms and interventions:  Continue PTA medications, including:     BuSpar 30 mg 2 times daily  Clozaril 200 mg at bedtime  Aricept 10 mg at bedtime  Gabapentin 100 mg 3 times a day  Haldol 1 mg at bedtime  Namenda 10 mg 2 times a day  Remeron 15 mg at bedtime     Continue hydroxyzine 25 mg q4h prn for acute anxiety  Continue Trazodone 50 mg at bedtime prn for sleep disturbances  Continue risperidone 1 mg q6h prn for severe agitation        Disposition Plan   Reason for ongoing admission: is unable to care for self due to severe psychosis or mariusz  Discharge location: [place]  Discharge Medications: not ordered  Follow-up Appointments: not scheduled  Legal Status: full commitment    Entered by: Wilton Morfin on February 3, 2022 at 10:07 AM       Video-Visit Details    Type of service:  Video Visit    Video Start Time (time video started): 0854    Video End Time (time video stopped): 0900    Originating Location (pt. Location): Other Station 3B    Distant Location (provider location): Provider remote location    Mode of Communication:  Video Conference via [employer]    Physician has received verbal consent for a Video Visit from the patient? Yes      Wilton Morifn MD

## 2022-02-03 NOTE — PLAN OF CARE
Problem: Sleep Disturbance (Psychotic Signs/Symptoms)  Goal: Improved Sleep (Psychotic Signs/Symptoms)  Outcome: No Change     Patient slept a total of 9  hours without any complaints made the whole night.

## 2022-02-03 NOTE — PLAN OF CARE
Problem: Cognitive Impairment (Psychotic Signs/Symptoms)  Goal: Optimal Cognitive Function (Psychotic Signs/Symptoms)  Outcome: No Change  Intervention: Support and Promote Cognitive Ability  Recent Flowsheet Documentation  Taken 2/3/2022 1100 by Juarez Murphy RN  Trust Relationship/Rapport: care explained     Problem: Decreased Participation and Engagement (Psychotic Signs/Symptoms)  Goal: Increased Participation and Engagement (Psychotic Signs/Symptoms)  Outcome: No Change     Problem: Social, Occupational or Functional Impairment (Psychotic Signs/Symptoms)  Goal: Enhanced Social, Occupational or Functional Skills (Psychotic Signs/Symptoms)  Outcome: No Change  Intervention: Promote Social, Occupational and Functional Ability  Recent Flowsheet Documentation  Taken 2/3/2022 1100 by Juarez Murphy RN  Trust Relationship/Rapport: care explained     Patient is isolative and withdrawn only coming out of room for meals. Fair appetite, ate 25% of breakfast and 75% of lunch. Denies SI,SIB, and AH. Denies anxiety and depression. Declined shower, appearance Is disheveled. Not social with peers, did not attend groups.

## 2022-02-03 NOTE — PLAN OF CARE
Assessment/Intervention/Current Symtoms and Care Coordination  Patient cooperative with care.  Current symptoms include psychosis, poor hygeine.  -Met with pt. He reported he is doing okay. Awaiting feedback from  and [facility] regarding START.  - Awaiting feedback from TCUs.  -New referral sent to [facility] - Tel [phone]  Fax [phone]  Awaiting feedback.         Discharge Plan or Goal   Currently being formulated due to guardianship difficulties and ongoing stabilization     Barriers to Discharge   Needs continued psychiatric assessment and stabilization.     Referral Status  START program, with the DHS  TCUs  Legal Status  Commitment and Jarvised. Court Hold    Contacts:  [facility]:  Ruth Garcia, RN, BSN, LALD, [facility]  President at [facility]  Phone: [phone] ([place])  Fax: [phone] ([place])  Mobile: [phone]  Web: [email or web address]  Email:  [email or web address]

## 2022-02-03 NOTE — PLAN OF CARE
"Patient has remained in his room all shift only coming to dining room for dinner. His affect is flat. His mood is calm. He is withdrawn and isolative. He denies SI and SIB. He denies depression and anxiety. He endorses AH stating they are saying \"what's going on?\". He is alert to self only. He denies pain. He refused to shower and go to group. He is not social with peers and staff. He ate 75% of his dinner with good fluid intake. He is medication compliant. His appearance is disheveled. He voices no concerns.  "

## 2022-02-04 LAB
BASOPHILS # BLD AUTO: 0.1 10E3/UL (ref 0–0.2)
BASOPHILS NFR BLD AUTO: 1 %
EOSINOPHIL # BLD AUTO: 1.6 10E3/UL (ref 0–0.7)
EOSINOPHIL NFR BLD AUTO: 23 %
ERYTHROCYTE [DISTWIDTH] IN BLOOD BY AUTOMATED COUNT: 15.8 % (ref 10–15)
HCT VFR BLD AUTO: 43.5 % (ref 40–53)
HGB BLD-MCNC: 14.8 G/DL (ref 13.3–17.7)
IMM GRANULOCYTES # BLD: 0 10E3/UL
IMM GRANULOCYTES NFR BLD: 0 %
LYMPHOCYTES # BLD AUTO: 1.5 10E3/UL (ref 0.8–5.3)
LYMPHOCYTES NFR BLD AUTO: 22 %
MCH RBC QN AUTO: 31.3 PG (ref 26.5–33)
MCHC RBC AUTO-ENTMCNC: 34 G/DL (ref 31.5–36.5)
MCV RBC AUTO: 92 FL (ref 78–100)
MONOCYTES # BLD AUTO: 0.6 10E3/UL (ref 0–1.3)
MONOCYTES NFR BLD AUTO: 8 %
NEUTROPHILS # BLD AUTO: 3.1 10E3/UL (ref 1.6–8.3)
NEUTROPHILS NFR BLD AUTO: 46 %
NRBC # BLD AUTO: 0 10E3/UL
NRBC BLD AUTO-RTO: 0 /100
PLATELET # BLD AUTO: 166 10E3/UL (ref 150–450)
RBC # BLD AUTO: 4.73 10E6/UL (ref 4.4–5.9)
WBC # BLD AUTO: 6.8 10E3/UL (ref 4–11)

## 2022-02-04 PROCEDURE — 85025 COMPLETE CBC W/AUTO DIFF WBC: CPT | Performed by: PSYCHIATRY & NEUROLOGY

## 2022-02-04 PROCEDURE — 36415 COLL VENOUS BLD VENIPUNCTURE: CPT | Performed by: PSYCHIATRY & NEUROLOGY

## 2022-02-04 PROCEDURE — 99233 SBSQ HOSP IP/OBS HIGH 50: CPT | Performed by: PSYCHIATRY & NEUROLOGY

## 2022-02-04 PROCEDURE — 250N000013 HC RX MED GY IP 250 OP 250 PS 637: Performed by: PSYCHIATRY & NEUROLOGY

## 2022-02-04 PROCEDURE — 124N000003 HC R&B MH SENIOR/ADOLESCENT

## 2022-02-04 RX ADMIN — MEMANTINE 10 MG: 10 TABLET ORAL at 20:23

## 2022-02-04 RX ADMIN — ASPIRIN 81 MG: 81 TABLET, COATED ORAL at 08:56

## 2022-02-04 RX ADMIN — GABAPENTIN 100 MG: 100 CAPSULE ORAL at 08:56

## 2022-02-04 RX ADMIN — SALMETEROL XINAFOATE 1 PUFF: 50 POWDER, METERED ORAL; RESPIRATORY (INHALATION) at 20:27

## 2022-02-04 RX ADMIN — BUSPIRONE HYDROCHLORIDE 30 MG: 15 TABLET ORAL at 08:56

## 2022-02-04 RX ADMIN — POLYETHYLENE GLYCOL 3350 17 G: 17 POWDER, FOR SOLUTION ORAL at 20:27

## 2022-02-04 RX ADMIN — ATORVASTATIN CALCIUM 80 MG: 80 TABLET, FILM COATED ORAL at 20:27

## 2022-02-04 RX ADMIN — MEGESTROL ACETATE 20 MG: 20 TABLET ORAL at 08:56

## 2022-02-04 RX ADMIN — POLYETHYLENE GLYCOL 3350 17 G: 17 POWDER, FOR SOLUTION ORAL at 08:56

## 2022-02-04 RX ADMIN — GABAPENTIN 100 MG: 100 CAPSULE ORAL at 17:40

## 2022-02-04 RX ADMIN — HALOPERIDOL 1 MG: 1 TABLET ORAL at 20:23

## 2022-02-04 RX ADMIN — LEVOTHYROXINE SODIUM 88 MCG: 0.09 TABLET ORAL at 08:56

## 2022-02-04 RX ADMIN — MEMANTINE 10 MG: 10 TABLET ORAL at 08:57

## 2022-02-04 RX ADMIN — SALMETEROL XINAFOATE 1 PUFF: 50 POWDER, METERED ORAL; RESPIRATORY (INHALATION) at 08:56

## 2022-02-04 RX ADMIN — GABAPENTIN 100 MG: 100 CAPSULE ORAL at 13:46

## 2022-02-04 RX ADMIN — BUSPIRONE HYDROCHLORIDE 30 MG: 15 TABLET ORAL at 20:23

## 2022-02-04 RX ADMIN — CLOZAPINE 200 MG: 100 TABLET ORAL at 20:23

## 2022-02-04 RX ADMIN — MIRTAZAPINE 15 MG: 15 TABLET, FILM COATED ORAL at 20:23

## 2022-02-04 RX ADMIN — DONEPEZIL HYDROCHLORIDE 10 MG: 10 TABLET ORAL at 20:23

## 2022-02-04 ASSESSMENT — ACTIVITIES OF DAILY LIVING (ADL)
DRESS: INDEPENDENT;PROMPTS
ORAL_HYGIENE: INDEPENDENT;PROMPTS
DRESS: INDEPENDENT;PROMPTS
HYGIENE/GROOMING: INDEPENDENT;PROMPTS
ORAL_HYGIENE: PROMPTS
HYGIENE/GROOMING: INDEPENDENT;PROMPTS

## 2022-02-04 NOTE — PLAN OF CARE
"  Problem: Social, Occupational or Functional Impairment (Psychotic Signs/Symptoms)  Goal: Enhanced Social, Occupational or Functional Skills (Psychotic Signs/Symptoms)  Outcome: No Change  Intervention: Promote Social, ONursing Assessment    Psychosis (H) [F29]    Admit Date: 1/26/2022    Length of Stay: 9    Patient evaluation continues. Assessed mood,anxiety,thoughts and behavior. Patient is  progressing towards goals. Patient is encouraged to participate in groups and assisted to develop healthy coping skills.  Patient denies auditory or visual hallucinations. /75 (BP Location: Right arm, Patient Position: Supine)   Pulse 96   Temp 97.8  F (36.6  C) (Oral)   Resp 16   Ht 1.778 m (5' 10\")   Wt 67.6 kg (149 lb 1.6 oz)   SpO2 100%   BMI 21.39 kg/m      Mood: ok    Patient reports depression 0/10 and reports anxiety 0/10    Affect:flat blunted    Sleep: 9 hours last night    Appetite: good 75%    SI: denies    HI: denies    SIB: denies      Medication Compliance medication complaint    Group participation: no, refuses    ADL's: needs encouragement, prompts but continues to refuse    Fall risk interventions: proper foot ear, med ed, orthostatic B/p     Rinku Score Interventions:none    Discharge planning in process    Refer to daily team meeting notes for individualized plan of care. Nursing will continue to assess.    *Scale is 1-10 and 10 is the worst.       ccupational and Functional Ability  Recent Flowsheet Documentation  Taken 2/4/2022 1100 by Wen Broderick, RN  Trust Relationship/Rapport:    care explained    choices provided    emotional support provided    empathic listening provided    thoughts/feelings acknowledged     "

## 2022-02-04 NOTE — PROGRESS NOTES
"[facility], [place]   Psychiatric Progress Note        Interim History:   The patient's care was discussed with the treatment team during the daily team meeting and/or staff's chart notes were reviewed.  Staff report patient has been disheveled.     The patient is more engaged today. Reports that he is \"okay.\" Sleeping and eating well. Denies any SI. Denies AH. Discussed getting his hair cut and nails trimmed. Patient does not want to do this but is agreeable to do it this weekend.          Medications:       aspirin  81 mg Oral Daily     atorvastatin  80 mg Oral At Bedtime     busPIRone HCl  30 mg Oral BID     cloZAPine  200 mg Oral At Bedtime     donepezil  10 mg Oral At Bedtime     gabapentin  100 mg Oral TID w/meals     haloperidol  1 mg Oral At Bedtime     levothyroxine  88 mcg Oral Daily     megestrol  20 mg Oral Daily     memantine  10 mg Oral BID     mirtazapine  15 mg Oral At Bedtime     polyethylene glycol  17 g Oral BID     salmeterol  1 puff Inhalation BID          Allergies:     Allergies   Allergen Reactions     Ibuprofen      Latex           Labs:     Recent Results (from the past 24 hour(s))   CBC with platelets and differential    Collection Time: 02/04/22  7:26 AM   Result Value Ref Range    WBC Count 6.8 4.0 - 11.0 10e3/uL    RBC Count 4.73 4.40 - 5.90 10e6/uL    Hemoglobin 14.8 13.3 - 17.7 g/dL    Hematocrit 43.5 40.0 - 53.0 %    MCV 92 78 - 100 fL    MCH 31.3 26.5 - 33.0 pg    MCHC 34.0 31.5 - 36.5 g/dL    RDW 15.8 (H) 10.0 - 15.0 %    Platelet Count 166 150 - 450 10e3/uL    % Neutrophils 46 %    % Lymphocytes 22 %    % Monocytes 8 %    % Eosinophils 23 %    % Basophils 1 %    % Immature Granulocytes 0 %    NRBCs per 100 WBC 0 <1 /100    Absolute Neutrophils 3.1 1.6 - 8.3 10e3/uL    Absolute Lymphocytes 1.5 0.8 - 5.3 10e3/uL    Absolute Monocytes 0.6 0.0 - 1.3 10e3/uL    Absolute Eosinophils 1.6 (H) 0.0 - 0.7 10e3/uL    Absolute Basophils 0.1 0.0 - 0.2 10e3/uL    " "Absolute Immature Granulocytes 0.0 <=0.4 10e3/uL    Absolute NRBCs 0.0 10e3/uL          Psychiatric Examination:     /75 (BP Location: Right arm, Patient Position: Supine)   Pulse 96   Temp 97.8  F (36.6  C) (Oral)   Resp 16   Ht 1.778 m (5' 10\")   Wt 67.6 kg (149 lb 1.6 oz)   SpO2 100%   BMI 21.39 kg/m    Weight is 149 lbs 1.6 oz  Body mass index is 21.39 kg/m .  Orthostatic Vitals  Report      Most Recent      Standing Orthostatic /71 01/31 0819    Standing Orthostatic Pulse (bpm) 116 01/31 0819            Appearance: awake, alert and adequately groomed  Attitude:  more cooperative  Eye Contact:  fair  Mood:  \"okay\"  Affect:  intensity is blunted  Speech:  paucity of speech, more verbal  Psychomotor Behavior:  no evidence of tardive dyskinesia, dystonia, or tics  Thought Process:  goal oriented  Associations:  no loose associations  Thought Content:  no evidence of suicidal ideation or homicidal ideation  Insight:  limited  Judgement:  limited  Oriented to:  person only  Attention Span and Concentration:  fair  Recent and Remote Memory:  poor    Clinical Global Impressions  First:  Considering your total clinical experience with this particular patient population, how severe are the patient's symptoms at this time?: 6 (01/27/22 2157)  Compared to the patient's condition at the START of treatment, this patient's condition is: 3 (01/27/22 2157)  Most recent:  Considering your total clinical experience with this particular patient population, how severe are the patient's symptoms at this time?: 6 (01/27/22 2157)  Compared to the patient's condition at the START of treatment, this patient's condition is: 3 (01/27/22 2157)         Precautions:     Behavioral Orders   Procedures     Code 1 - Restrict to Unit     Routine Programming     As clinically indicated     Status 15     Every 15 minutes.          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  He was hospitalized at [facility] for approximately one year and then transferred to station 3B mental health unit for ECT treatment where he was hospitalized 6/30/21 - 1/13/22 until it was discovered that he was COVID-19 positive. He was subsequently transferred to medicine service until 1/19/22 and then discharged to [facility] COVID-19 unit until medically stable. He was readmitted to geriatric unit, station 3B, on 1/26/22. He is currently under commitment with Yanez being amended to include Clozaril.   Patient has been on Zyprexa, Haldol, Risperdal that were not effective. He is confused, disoriented and endorses visual hallucinations, however he denied auditory hallucinations. He is oriented to self only. Patient is unable to take care of himself in the community and the plan will be for the patient to have a guardian moving forward. Inpatient psychiatric hospitalization is warranted at this time for safety, stabilization, and possible adjustment in medications.     Target psychiatric symptoms and interventions:  Continue PTA medications, including:     BuSpar 30 mg 2 times daily  Clozaril 200 mg at bedtime  Aricept 10 mg at bedtime  Gabapentin 100 mg 3 times a day  Haldol 1 mg at bedtime  Namenda 10 mg 2 times a day  Remeron 15 mg at bedtime     Continue hydroxyzine 25 mg q4h prn for acute anxiety  Continue Trazodone 50 mg at bedtime prn for sleep disturbances  Continue risperidone 1 mg q6h prn for severe agitation        Disposition Plan   Reason for ongoing admission: is unable to care for self due to severe psychosis or  mariusz  Discharge location: [place]  Discharge Medications: not ordered  Follow-up Appointments: not scheduled  Legal Status: full commitment    Entered by: Wilton Morfin on February 4, 2022 at 11:02 AM

## 2022-02-04 NOTE — PLAN OF CARE
Problem: Sleep Disturbance (Psychotic Signs/Symptoms)  Goal: Improved Sleep (Psychotic Signs/Symptoms)  Outcome: No Change     Patient slept a total of 9 hours without any complaints made the whole shift.

## 2022-02-04 NOTE — PLAN OF CARE
Patient has spent the shift in his room. He is isolative and withdrawn. He came to the dining room for dinner and ate 100%. He has good fluid intake with encouragement. His affect is flat. His mood is calm. He denies SI and SIB. He denies AH but endorses depression an anxiety. He is alert to self only.  He denies pain. He refused to take a shower. He is disheveled and untidy with matted hair and long nails. He refused to have staff wash hair and cut nails. He refused to attend groups. He is medication compliant. He voices no concerns.

## 2022-02-04 NOTE — PLAN OF CARE
Assessment/Intervention/Current Symtoms and Care Coordination  Patient cooperative with care.  Current symptoms include psychosis, poor hygeine.  -Met with pt. He reported he is doing okay. Awaiting feedback from  and [facility] regarding START.  - Awaiting feedback from TCUs.  -New referral sent to [facility] - Tel [phone]  Fax [phone]  Awaiting feedback.  -Will follow up with referral sources. Last information from  was that the pt reported he does not agree he needs a guardian.           Discharge Plan or Goal   Currently being formulated due to guardianship difficulties and ongoing stabilization     Barriers to Discharge   Needs continued psychiatric assessment and stabilization.     Referral Status  START program, with the DHS  TCUs  Legal Status  Commitment and Jarvised. Court Hold     Contacts:  [facility]:  Ruth Garcia, RN, BSN, [place], [facility]  President at [facility]  Phone: [phone] ([place])  Fax: [phone] ([place])  Mobile: [phone]  Web: [email or web address]  Email:  [email or web address]

## 2022-02-05 PROCEDURE — 250N000013 HC RX MED GY IP 250 OP 250 PS 637: Performed by: PSYCHIATRY & NEUROLOGY

## 2022-02-05 PROCEDURE — 124N000003 HC R&B MH SENIOR/ADOLESCENT

## 2022-02-05 RX ADMIN — POLYETHYLENE GLYCOL 3350 17 G: 17 POWDER, FOR SOLUTION ORAL at 08:26

## 2022-02-05 RX ADMIN — GABAPENTIN 100 MG: 100 CAPSULE ORAL at 11:53

## 2022-02-05 RX ADMIN — CLOZAPINE 200 MG: 100 TABLET ORAL at 20:33

## 2022-02-05 RX ADMIN — GABAPENTIN 100 MG: 100 CAPSULE ORAL at 08:25

## 2022-02-05 RX ADMIN — ASPIRIN 81 MG: 81 TABLET, COATED ORAL at 08:25

## 2022-02-05 RX ADMIN — LEVOTHYROXINE SODIUM 88 MCG: 0.09 TABLET ORAL at 08:26

## 2022-02-05 RX ADMIN — ATORVASTATIN CALCIUM 80 MG: 80 TABLET, FILM COATED ORAL at 20:37

## 2022-02-05 RX ADMIN — HALOPERIDOL 1 MG: 1 TABLET ORAL at 20:34

## 2022-02-05 RX ADMIN — MEGESTROL ACETATE 20 MG: 20 TABLET ORAL at 08:26

## 2022-02-05 RX ADMIN — BUSPIRONE HYDROCHLORIDE 30 MG: 15 TABLET ORAL at 08:25

## 2022-02-05 RX ADMIN — MEMANTINE 10 MG: 10 TABLET ORAL at 20:33

## 2022-02-05 RX ADMIN — MEMANTINE 10 MG: 10 TABLET ORAL at 08:26

## 2022-02-05 RX ADMIN — SALMETEROL XINAFOATE 1 PUFF: 50 POWDER, METERED ORAL; RESPIRATORY (INHALATION) at 08:27

## 2022-02-05 RX ADMIN — SALMETEROL XINAFOATE 1 PUFF: 50 POWDER, METERED ORAL; RESPIRATORY (INHALATION) at 20:43

## 2022-02-05 RX ADMIN — POLYETHYLENE GLYCOL 3350 17 G: 17 POWDER, FOR SOLUTION ORAL at 20:37

## 2022-02-05 RX ADMIN — GABAPENTIN 100 MG: 100 CAPSULE ORAL at 17:45

## 2022-02-05 RX ADMIN — DONEPEZIL HYDROCHLORIDE 10 MG: 10 TABLET ORAL at 20:33

## 2022-02-05 RX ADMIN — BUSPIRONE HYDROCHLORIDE 30 MG: 15 TABLET ORAL at 20:33

## 2022-02-05 RX ADMIN — MIRTAZAPINE 15 MG: 15 TABLET, FILM COATED ORAL at 20:39

## 2022-02-05 ASSESSMENT — ACTIVITIES OF DAILY LIVING (ADL)
DRESS: SCRUBS (BEHAVIORAL HEALTH)
HYGIENE/GROOMING: PROMPTS
DRESS: SCRUBS (BEHAVIORAL HEALTH);PROMPTS
HYGIENE/GROOMING: PROMPTS
ORAL_HYGIENE: PROMPTS
ORAL_HYGIENE: PROMPTS

## 2022-02-05 NOTE — PLAN OF CARE
Problem: Behavior Regulation Impairment (Psychotic Signs/Symptoms)  Goal: Improved Behavioral Control (Psychotic Signs/Symptoms)  Outcome: No Change   Pt  continues to be only oriented to self and a hospital. Pt continues to report that he believes that he is at [facility]. Pt is isolative to his room and has only come out for meals. Pt affect is flat, eye contact is inconsistent and he does not always respond to questions. Pt has not made any SI/SIB/wishes to be dead statements.   Pt is medication compliant.   Pt needs reminders to drink fluids. Pt ate 3 pieces of Maltese toast, 50% of his fried potatoes, 30% of a banana, 120 ml of orange juice, 120 ml of milk and 120 ml of vanilla ensure for breakfast. Pt took 100 ml of orange juice with his noon medications.   Pt came out for lunch when prompted. Pt ate 50% of his chicken tenders, 30% of his mashed potatoes, 100% of his vanilla pudding, 120 ml of orange juice and 240 ml of 1% milk. Pt declined to stay in the lounge after lunch and returned to his bed.

## 2022-02-05 NOTE — PLAN OF CARE
Patient isolates most of the time to his room.  Came out for dinner tonight then returns to his room.  Admits is highly anxious and depressed but doesn't answer further questions.  No further complaints or concerns.

## 2022-02-06 PROCEDURE — 250N000013 HC RX MED GY IP 250 OP 250 PS 637: Performed by: PSYCHIATRY & NEUROLOGY

## 2022-02-06 PROCEDURE — 124N000003 HC R&B MH SENIOR/ADOLESCENT

## 2022-02-06 RX ADMIN — LEVOTHYROXINE SODIUM 88 MCG: 0.09 TABLET ORAL at 08:34

## 2022-02-06 RX ADMIN — POLYETHYLENE GLYCOL 3350 17 G: 17 POWDER, FOR SOLUTION ORAL at 08:37

## 2022-02-06 RX ADMIN — BUSPIRONE HYDROCHLORIDE 30 MG: 15 TABLET ORAL at 19:52

## 2022-02-06 RX ADMIN — SALMETEROL XINAFOATE 1 PUFF: 50 POWDER, METERED ORAL; RESPIRATORY (INHALATION) at 08:33

## 2022-02-06 RX ADMIN — GABAPENTIN 100 MG: 100 CAPSULE ORAL at 11:54

## 2022-02-06 RX ADMIN — CLOZAPINE 200 MG: 100 TABLET ORAL at 19:53

## 2022-02-06 RX ADMIN — DONEPEZIL HYDROCHLORIDE 10 MG: 10 TABLET ORAL at 19:53

## 2022-02-06 RX ADMIN — HALOPERIDOL 1 MG: 1 TABLET ORAL at 19:53

## 2022-02-06 RX ADMIN — BUSPIRONE HYDROCHLORIDE 30 MG: 15 TABLET ORAL at 08:34

## 2022-02-06 RX ADMIN — MEMANTINE 10 MG: 10 TABLET ORAL at 19:53

## 2022-02-06 RX ADMIN — GABAPENTIN 100 MG: 100 CAPSULE ORAL at 08:34

## 2022-02-06 RX ADMIN — GABAPENTIN 100 MG: 100 CAPSULE ORAL at 17:37

## 2022-02-06 RX ADMIN — MEMANTINE 10 MG: 10 TABLET ORAL at 08:34

## 2022-02-06 RX ADMIN — MIRTAZAPINE 15 MG: 15 TABLET, FILM COATED ORAL at 19:53

## 2022-02-06 RX ADMIN — SALMETEROL XINAFOATE 1 PUFF: 50 POWDER, METERED ORAL; RESPIRATORY (INHALATION) at 19:52

## 2022-02-06 RX ADMIN — ATORVASTATIN CALCIUM 80 MG: 80 TABLET, FILM COATED ORAL at 19:52

## 2022-02-06 RX ADMIN — MEGESTROL ACETATE 20 MG: 20 TABLET ORAL at 08:34

## 2022-02-06 RX ADMIN — ASPIRIN 81 MG: 81 TABLET, COATED ORAL at 08:33

## 2022-02-06 ASSESSMENT — MIFFLIN-ST. JEOR: SCORE: 1567.43

## 2022-02-06 ASSESSMENT — ACTIVITIES OF DAILY LIVING (ADL)
ORAL_HYGIENE: PROMPTS
DRESS: PROMPTS;SCRUBS (BEHAVIORAL HEALTH)
DRESS: PROMPTS
ORAL_HYGIENE: PROMPTS
HYGIENE/GROOMING: PROMPTS
HYGIENE/GROOMING: PROMPTS

## 2022-02-06 NOTE — PLAN OF CARE
Patient has remained in his room sleeping throughout the shift with 9 hrs of sleep.  No concerns reported.

## 2022-02-06 NOTE — PLAN OF CARE
Problem: Behavior Regulation Impairment (Psychotic Signs/Symptoms)  Goal: Improved Behavioral Control (Psychotic Signs/Symptoms)  Outcome: No Change   Pt continues to have poor insight into his situation/cognitive issues. Pt remains isolative and comes out for meals with prompting. Pt is oriented only to himself. Pt's affect is flat and his eye contact is inconsistent. Pt's speech is mumbled at times and his answers are brief.  Pt needs prompts and direction with ADL's. Pt has been eating fair at meals. Pt does need prompts to increase fluids. Pt has been medication compliant.   Pt has shown no agitation/aggression/irritability.  Pt denies SI/SIB.  Pt came out for lunch and  ate 75% of his lunch (75% beef stroganoff, 50% chicken noodle soup, 50% broccoli, 100% vanilla ice cream, 200 ml of milk).

## 2022-02-06 NOTE — PLAN OF CARE
Isolative to his room.  Ate 75% of his meal. Answers  any questions asked with a brief statement.  No  new concerns voiced.  P:  continue same plan of care.

## 2022-02-06 NOTE — PLAN OF CARE
"[facility].  Asked the patient if he needed anything and he responded, \"I came to down for a cigarette\".  Kalpana told that smoking wasn't allowed here the patient went back to his room.  Patient denies anxiety but rates his depression as \"high\"  Denies SI or the wish to be dead.  Small to moderate soft to loose incontinent BM .  mirilax was held tonight.    "

## 2022-02-07 LAB
ALBUMIN SERPL-MCNC: 3.3 G/DL (ref 3.4–5)
ALP SERPL-CCNC: 85 U/L (ref 40–150)
ALT SERPL W P-5'-P-CCNC: 141 U/L (ref 0–70)
ANION GAP SERPL CALCULATED.3IONS-SCNC: 10 MMOL/L (ref 3–14)
AST SERPL W P-5'-P-CCNC: 212 U/L (ref 0–45)
BILIRUB SERPL-MCNC: 0.3 MG/DL (ref 0.2–1.3)
BUN SERPL-MCNC: 21 MG/DL (ref 7–30)
CALCIUM SERPL-MCNC: 9 MG/DL (ref 8.5–10.1)
CHLORIDE BLD-SCNC: 114 MMOL/L (ref 94–109)
CO2 SERPL-SCNC: 18 MMOL/L (ref 20–32)
CREAT SERPL-MCNC: 0.67 MG/DL (ref 0.66–1.25)
ERYTHROCYTE [DISTWIDTH] IN BLOOD BY AUTOMATED COUNT: 16.2 % (ref 10–15)
GFR SERPL CREATININE-BSD FRML MDRD: >90 ML/MIN/1.73M2
GLUCOSE BLD-MCNC: 119 MG/DL (ref 70–99)
HCT VFR BLD AUTO: 40 % (ref 40–53)
HGB BLD-MCNC: 14.1 G/DL (ref 13.3–17.7)
MCH RBC QN AUTO: 32.9 PG (ref 26.5–33)
MCHC RBC AUTO-ENTMCNC: 35.3 G/DL (ref 31.5–36.5)
MCV RBC AUTO: 93 FL (ref 78–100)
PLATELET # BLD AUTO: 169 10E3/UL (ref 150–450)
POTASSIUM BLD-SCNC: 3.8 MMOL/L (ref 3.4–5.3)
PROT SERPL-MCNC: 7.6 G/DL (ref 6.8–8.8)
RBC # BLD AUTO: 4.29 10E6/UL (ref 4.4–5.9)
SODIUM SERPL-SCNC: 142 MMOL/L (ref 133–144)
WBC # BLD AUTO: 8.5 10E3/UL (ref 4–11)

## 2022-02-07 PROCEDURE — 85014 HEMATOCRIT: CPT | Performed by: PHYSICIAN ASSISTANT

## 2022-02-07 PROCEDURE — 80053 COMPREHEN METABOLIC PANEL: CPT | Performed by: PHYSICIAN ASSISTANT

## 2022-02-07 PROCEDURE — 250N000013 HC RX MED GY IP 250 OP 250 PS 637: Performed by: PSYCHIATRY & NEUROLOGY

## 2022-02-07 PROCEDURE — 99233 SBSQ HOSP IP/OBS HIGH 50: CPT | Performed by: PSYCHIATRY & NEUROLOGY

## 2022-02-07 PROCEDURE — 36415 COLL VENOUS BLD VENIPUNCTURE: CPT | Performed by: PHYSICIAN ASSISTANT

## 2022-02-07 PROCEDURE — 124N000003 HC R&B MH SENIOR/ADOLESCENT

## 2022-02-07 RX ADMIN — GABAPENTIN 100 MG: 100 CAPSULE ORAL at 17:01

## 2022-02-07 RX ADMIN — CLOZAPINE 200 MG: 100 TABLET ORAL at 20:12

## 2022-02-07 RX ADMIN — SALMETEROL XINAFOATE 1 PUFF: 50 POWDER, METERED ORAL; RESPIRATORY (INHALATION) at 20:11

## 2022-02-07 RX ADMIN — DONEPEZIL HYDROCHLORIDE 10 MG: 10 TABLET ORAL at 20:12

## 2022-02-07 RX ADMIN — GABAPENTIN 100 MG: 100 CAPSULE ORAL at 11:52

## 2022-02-07 RX ADMIN — MIRTAZAPINE 15 MG: 15 TABLET, FILM COATED ORAL at 20:11

## 2022-02-07 RX ADMIN — ASPIRIN 81 MG: 81 TABLET, COATED ORAL at 08:32

## 2022-02-07 RX ADMIN — BUSPIRONE HYDROCHLORIDE 30 MG: 15 TABLET ORAL at 08:31

## 2022-02-07 RX ADMIN — MEMANTINE 10 MG: 10 TABLET ORAL at 08:32

## 2022-02-07 RX ADMIN — HALOPERIDOL 1 MG: 1 TABLET ORAL at 20:12

## 2022-02-07 RX ADMIN — MEGESTROL ACETATE 20 MG: 20 TABLET ORAL at 08:32

## 2022-02-07 RX ADMIN — BUSPIRONE HYDROCHLORIDE 30 MG: 15 TABLET ORAL at 20:11

## 2022-02-07 RX ADMIN — GABAPENTIN 100 MG: 100 CAPSULE ORAL at 08:31

## 2022-02-07 RX ADMIN — LEVOTHYROXINE SODIUM 88 MCG: 0.09 TABLET ORAL at 08:31

## 2022-02-07 RX ADMIN — SALMETEROL XINAFOATE 1 PUFF: 50 POWDER, METERED ORAL; RESPIRATORY (INHALATION) at 08:33

## 2022-02-07 RX ADMIN — MEMANTINE 10 MG: 10 TABLET ORAL at 20:12

## 2022-02-07 ASSESSMENT — ACTIVITIES OF DAILY LIVING (ADL)
LAUNDRY: UNABLE TO COMPLETE
ORAL_HYGIENE: PROMPTS;WITH ASSISTANCE
LAUNDRY: UNABLE TO COMPLETE
DRESS: PROMPTS;WITH ASSISTANCE
HYGIENE/GROOMING: PROMPTS
ORAL_HYGIENE: PROMPTS
HYGIENE/GROOMING: PROMPTS;WITH ASSISTANCE
DRESS: SCRUBS (BEHAVIORAL HEALTH);INDEPENDENT

## 2022-02-07 NOTE — PROGRESS NOTES
"Brief Medicine Note    Contacted by nursing regarding patient having soft blood pressure of 95/64. Reports mild lightheadedness. RN reports patient drank >800mL of fluid this shift. He just finished eating a good lunch. Not on antihypertensives. He is on Clozaril which appears to be a chronic medication at 200mg at bedtime. He has been having some loose stools; RN reports patient having ~2 loose, soft stools today. Miralax is being held. Not watery. No complaints of chest pain or SOB. Afebrile without infectious signs or symptoms.     Today's vital signs, medications, and nursing notes were reviewed. .     BP 95/64   Pulse 79   Temp 97.5  F (36.4  C) (Oral)   Resp 16   Ht 1.778 m (5' 10\")   Wt 74.1 kg (163 lb 6.4 oz)   SpO2 98%   BMI 23.45 kg/m       A/P:  # Soft blood pressure:  BP 95/64 currently. Per review, patient has softer baseline BP of -110. Is tolerating PO intake. No infectious signs or symptoms. Having loose stools but <3 per day and holding miralax; this could be attributing to volume loss.   - Discussed with RN to notify Psychiatry about Clozaril if BP remains soft to consider dose reduction  - Encourage fluids  - Will check CBC and CMP now  - If SBP <90 or DBP <60, MAP <60 or symptomatic, please notify Medicine. Would consider IV bolus of fluids at that time  - If having >3 or more loose stools per day will have low threshold to test for C. Diff. Continue holding laxatives.     Addendum: CMP and CBC reviewed. CMP notable for new transaminitis with , and  (last were wnl on 1/21). Bicarb low at 18 (stable). CBC is unremarkable. Will hold atorvastatin given LFT elevation (suspect medication induced). Reviewed medications and clozaril can cause elevations in LFT up to 2-3x upper limit of normal, but per uptodate; usually it is self-limiting and can be monitored without dose change unless LFT elevation remains constant or if patient became symptomatic. No other obvious " medication culprits. Will consult pharmacy for assistance with medications that can cause LFT elevations. Will add on INR and trend CMP in AM. Please notify Medicine if fever >100.4F, abdominal pain, N/V or anorexia - would get RUQ US imaging at that time.     Medicine will continue to follow. Please notify Medicine with any additional questions or concerns.     Kim Harman PA-C  Hospitalist Service  Pager [phone]

## 2022-02-07 NOTE — PLAN OF CARE
Assessment/Intervention/Current Symtoms and Care Coordination  The patient's care was discussed with the treatment team and chart notes were reviewed  Efrain ([facility]) emailed , DHS worker and introduced writer. This [facility] will continue to coordinate cares with [place].          Discharge Plan or Goal   Currently being formulated due to guardianship difficulties and ongoing stabilization     Barriers to Discharge   Needs continued psychiatric assessment and stabilization.     Referral Status  START program, with the DHS  TCUs  Legal Status  Commitment and Jarvised. Court Hold     Contacts:  [facility]:  Ruth Garcia, RN, BSN, LALD, [facility]  President at [facility]  Phone: [phone] ([place])  Fax: [phone] ([place])  Mobile: [phone]  Web: [email or web address]  Email:  [email or web address]

## 2022-02-07 NOTE — PLAN OF CARE
"  Problem: Behavior Regulation Impairment (Psychotic Signs/Symptoms)  Goal: Improved Behavioral Control (Psychotic Signs/Symptoms)  2/7/2022 0857 by Una Gaytan RN  Outcome: No Change  2/7/2022 0850 by Una Gaytan, RN  Outcome: No Change     Problem: Psychomotor Impairment (Psychotic Signs/Symptoms)  Goal: Improved Psychomotor Symptoms (Psychotic Signs/Symptoms)  Outcome: No Change   Pt is oriented only to himself and being in a hospital. Pt continues to report that he believes that he as at [facility]. Pt believes the month the be \"June\", the year as \"2099\" and the day of the week as \"Thursday\". Pt identifies feeling anxious and identifies not being able to leave as the trigger for this.   Pt declined to come out for breakfast. Pt did take 180 ml of 1% milk and 50% of a container of applesauce with his AM medications.   Pt's AM dose of Miralax was held as he had a loose stool last evening. Pt was assisted with changing clothing this AM and found to have a small amount of soft unformed stool in his pull ups and needed directives to do jeanie care and assistance with changing pull ups. Pt appreciative of care. Pt asked writer when his \"commitment will be over\" and when writer asked if she could get him anything he asked for a \"budweiser\".   Pt came out for lunch and ate well- 75%. Pt has taken approximately 840 ml of fluids this shift.   Pt's blood pressure continues to low (after lunch 94/64 with a pulse of 79). Pt admits to feeling \"a little\" dizzy when asked.   Kim/Medical PA was notified. Continue to monitor and encourage patient to drink water.     Pt noted to have been incontinent of a sm/md unformed stool after lunch. Pt was assisted with a shower and linen was changed.     1515- Pt reporting that he is hungry. Pt came to the [facility] and requested a peanut butter and jelly sandwich. Pt was provided with crackers and sunbutter. Pt also given a large glass of water and has been encouraged to drink this. Pt " "was informed that the medical provider has order a blood draw. Pt stated that he has already had it taken \"three times\". Pt's affect is flat with poor eye contact.   "

## 2022-02-07 NOTE — DISCHARGE INSTRUCTIONS
Behavioral Discharge Planning and Instructions    Summary: You were admitted on 1/26/2022  due to Major neurocognitive disorder, due to multiple etiologies, with behavioral disturbance, severe. You were treated by Ginger Dubon MDand discharged on 10/13/2022 from [facility] to [facility], [address] [place] [address]. 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 - You will be contacted on Thursday and be scheduled to see Dr. Olmos. They require you to actually live in the [facility] before they can schedule you.  Dr. Olmos  [facility]   E-mail: [email or web address]  Benito Daily  Phone: [phone]  Appointment scheduling: You will be contacted on Thursday, October 13, 2022 to schedule your appointment.  [phone]     Fax: [phone]    CLOZARIL BLOOD WORK FOLLOW UP: To be done by nurse in this facility, per Mayda Faulkner, [place] [address]  Phone: [phone]  Fax: [phone]    PRIMARY CARE FOLLOW UP:  Dr. Valero  Address: [address] [place] [address]  Phone: [phone]  Fax: [phone]  Appointment Date/Time: 1:00 pm on October 24, 2022. Staff reported they will prefer [facility]. Please call to cancel this appointment if not to be used.      Primary Care Follow Up  [facility]  Address: [address], [place] [address]  [address]  [place] [address]  Phone: [phone]  Fax: [phone]  Appointment Date/Time: Per Lana  Assisted Living staffMayda this will be coordinated by the Care Facility and pt's guardian.    PHARMACY:  [facility]  Address: [address] [place] [address]  Phone: [phone]    Assisted Living/Memory Care  ([facility])  [facility], [address] [place] [address].   Phone: [phone].  Appointment Date/Time: Thursday, October 13, 2022. Will be discharging at 10:45am    LEGAL GUARDIAN:  Hallie Johnson  Guardian/Yousufator  [facility]  [address], [place] [address]  Office: [phone] / Fax: [phone]       Long Term Services and Supports  JORDY Mckeon  [facility]  Initial   [address]  [place] [address]  [phone] office  [phone] mobile  [phone] fax    Contact:  Joi Roque  [facility]   [facility]  Phone: [phone]    Fax: [phone]      /:  VALERI Etienne,    (direct)[phone] (fax) [phone] (cell) [phone]  [address] [place] [address]  [email or web address]      [facility]:  Jo Mcgraw    [facility]  Assisted Living & Memory Care  [place], [place]  Cell: [phone]  Fax: [phone]  Email:  [email or web address]  Web: [email or web address]      Attend all scheduled appointments with your outpatient providers. Call at least 24 hours in advance if you need to reschedule an appointment to ensure continued access to your outpatient providers.     Major Treatments, Procedures and Findings: You were provided with: a psychiatric assessment, assessed for medical stability, medication evaluation and/or management, group therapy, individual therapy, CD evaluation/assessment, milieu management and medical  "interventions    Symptoms to Report: feeling more aggressive, increased confusion, losing more sleep, mood getting worse or thoughts of suicide    Early warning signs can include: increased depression or anxiety sleep disturbances increased thoughts or behaviors of suicide or self-harm  increased unusual thinking, such as paranoia or hearing voices    Safety and Wellness:  Take all medicines as directed.  Make no changes unless your doctor suggests them. Follow treatment recommendations.  Refrain from alcohol and non-prescribed drugs.  If there is a concern for safety, call 911.    Resources:   Crisis Intervention: 958.452.8637 or 539-463-8861 (TTY: 473.896.8082).  Call anytime for help.  National [place] on Mental Illness (www.[place].martin.org): 104.446.5274 or 913-535-0032.  Alcoholics Anonymous (www.alcoholics-anonymous.org): Check your phone book for your local chapter.  Suicide Awareness Voices of Education (SAVE) (www.save.org): 786-024-UHTZ (8026)  National Suicide Prevention Line (www.mentalhealthmn.org): 575-386-ZYYC (9347)  Mental Health Consumer/Survivor Network of [place] (www.mhcsn.net): 669.750.7294 or 477-245-3576  Mental Health Association of [place] (www.mentalhealth.org): 592.367.8786 or 972-017-2786  Self- Management and Recovery Training., SMART-- Toll free: 594.690.8494  www.getFound.ie.org  [facility] Crisis Response 535 983-0412  [facility] Crisis Response 087-224-9070  [facility] Crisis Response [phone]  [facility] Crisis (COPE) Response - Adult 784 150-2720  [facility] Crisis Response - Adult 622 818-3688  [facility] Rapid Response 702-714-0161  Text 4 Life: txt \"LIFE\" to 33295 for immediate support and crisis intervention  Crisis text line: Text \"[place]\" to 468090. Free, confidential, 24/7.  Crisis Intervention: 597.261.3938 or 163-506-1155. Call anytime for help.   [facility] Mental Health Crisis Team - Child: [phone]  [facility]'s " Mental Health Crisis Response Team - Child: [phone]  [facility] Mental Health Crisis: [phone]   [facility] Mental Health Crisis Team:  [phone]  Marcos Dhaliwal Benton and Alexis [facility]' [facility] Mobile Crisis Response Team (CRT):  [phone] or [phone]     General Medication Instructions:   See your medication sheet(s) for instructions.   Take all medicines as directed.  Make no changes unless your doctor suggests them.   Go to all your doctor visits.  Be sure to have all your required lab tests. This way, your medicines can be refilled on time.  Do not use any drugs not prescribed by your doctor.  Avoid alcohol.    Advance Directives:   Scanned document on file with [employer]? No scanned doc  Is document scanned? No. Copy Requested.  Honoring Choices Your Rights Handout: Informed and given  Was more information offered? Materials given    The Treatment team has appreciated the opportunity to work with you. If you have any questions or concerns about your recent admission, you can contact the unit which can receive your call 24 hours a day, 7 days a week. They will be able to get in touch with a Provider if needed. The unit number is [phone].

## 2022-02-07 NOTE — PLAN OF CARE
Patient has remained in his room sleeping for the entire shift.  No episodes of incontinence.  9.5 hrs of sleep.

## 2022-02-07 NOTE — PROGRESS NOTES
"[facility] [place]   Psychiatric Progress Note        Interim History:   The patient's care was discussed with the treatment team during the daily team meeting and/or staff's chart notes were reviewed.  Staff report patient has been about the same. Did come out to the lounge and asked for a cigarette.     The patient is well groomed - has had a haircut and beard trim. Reports that he is \"fine.\" Sleep was \"like sh*t\". Did eat \"some\" and took some bits of eggs during our interview. Denies SI. Denies AH.          Medications:       aspirin  81 mg Oral Daily     atorvastatin  80 mg Oral At Bedtime     busPIRone HCl  30 mg Oral BID     cloZAPine  200 mg Oral At Bedtime     donepezil  10 mg Oral At Bedtime     gabapentin  100 mg Oral TID w/meals     haloperidol  1 mg Oral At Bedtime     levothyroxine  88 mcg Oral Daily     megestrol  20 mg Oral Daily     memantine  10 mg Oral BID     mirtazapine  15 mg Oral At Bedtime     polyethylene glycol  17 g Oral BID     salmeterol  1 puff Inhalation BID          Allergies:     Allergies   Allergen Reactions     Ibuprofen      Latex           Labs:     No results found for this or any previous visit (from the past 24 hour(s)).       Psychiatric Examination:     /88 (BP Location: Right arm, Patient Position: Supine)   Pulse 97   Temp 98.5  F (36.9  C) (Temporal)   Resp 16   Ht 1.778 m (5' 10\")   Wt 74.1 kg (163 lb 6.4 oz)   SpO2 96%   BMI 23.45 kg/m    Weight is 163 lbs 6.4 oz  Body mass index is 23.45 kg/m .  Orthostatic Vitals  Report      Most Recent      Standing Orthostatic /71 01/31 0819    Standing Orthostatic Pulse (bpm) 116 01/31 0819            Appearance: awake, alert and adequately groomed  Attitude:  more cooperative  Eye Contact:  good  Mood:  \"fine\"  Affect:  intensity is blunted  Speech:  paucity of speech, more verbal  Psychomotor Behavior:  no evidence of tardive dyskinesia, dystonia, or tics  Thought Process:  goal " oriented  Associations:  no loose associations  Thought Content:  no evidence of suicidal ideation or homicidal ideation  Insight:  limited  Judgement:  limited  Oriented to:  person only  Attention Span and Concentration:  fair  Recent and Remote Memory:  poor    Clinical Global Impressions  First:  Considering your total clinical experience with this particular patient population, how severe are the patient's symptoms at this time?: 6 (01/27/22 2157)  Compared to the patient's condition at the START of treatment, this patient's condition is: 3 (01/27/22 2157)  Most recent:  Considering your total clinical experience with this particular patient population, how severe are the patient's symptoms at this time?: 6 (01/27/22 2157)  Compared to the patient's condition at the START of treatment, this patient's condition is: 3 (01/27/22 2157)         Precautions:     Behavioral Orders   Procedures     Code 1 - Restrict to Unit     Routine Programming     As clinically indicated     Status 15     Every 15 minutes.          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  He was hospitalized at [facility] for approximately one year and then transferred to [facility] unit for ECT treatment where he was hospitalized 6/30/21 - 1/13/22 until it was discovered that he was COVID-19 positive. He was subsequently transferred to medicine service until 1/19/22 and then discharged to [facility] COVID-19 unit until medically stable. He was readmitted to geriatric unit, station [facility], on 1/26/22. He is  currently under commitment with Yanez being amended to include Clozaril.   Patient has been on Zyprexa, Haldol, Risperdal that were not effective. He is confused, disoriented and endorses visual hallucinations, however he denied auditory hallucinations. He is oriented to self only. Patient is unable to take care of himself in the community and the plan will be for the patient to have a guardian moving forward. Inpatient psychiatric hospitalization is warranted at this time for safety, stabilization, and possible adjustment in medications.     Target psychiatric symptoms and interventions:  Continue PTA medications, including:     BuSpar 30 mg 2 times daily  Clozaril 200 mg at bedtime  Aricept 10 mg at bedtime  Gabapentin 100 mg 3 times a day  Haldol 1 mg at bedtime  Namenda 10 mg 2 times a day  Remeron 15 mg at bedtime     Continue hydroxyzine 25 mg q4h prn for acute anxiety  Continue Trazodone 50 mg at bedtime prn for sleep disturbances  Continue risperidone 1 mg q6h prn for severe agitation        Disposition Plan   Reason for ongoing admission: is unable to care for self due to severe psychosis or mariusz  Discharge location: [facility]  Discharge Medications: not ordered  Follow-up Appointments: not scheduled  Legal Status: full commitment    Entered by: Wilton Morfin on February 7, 2022 at 11:28 AM

## 2022-02-07 NOTE — PROGRESS NOTES
CLINICAL NUTRITION SERVICES - REASSESSMENT NOTE     Nutrition Prescription    RECOMMENDATIONS FOR MDs/PROVIDERS TO ORDER:  Encourage po intake    Malnutrition Status:    Patient does not meet two of the established criteria necessary for diagnosing malnutrition    Recommendations already ordered by Registered Dietitian (RD):  Continue Magic Cup BID    Future/Additional Recommendations:  Monitor po intake / acceptance of ONS / weight     EVALUATION OF THE PROGRESS TOWARD GOALS   Diet: Mechanical/Dental Soft.  Ensure changed to Magic cup BID 2/4  Intake: per flowsheets, primarily 75% meals, although varies -  bites to 100%     NEW FINDINGS   Weight 2/6 74.1 kg, up from adm weight 1/26 72.8 kg  Observed 25% eggs and sausage + milk consumed from breakfast tray (@ bedside) - patient reports he will keep working on it.    MALNUTRITION  % Intake: Decreased intake does not meet criteria  % Weight Loss: Weight loss does not meet criteria - prior weight loss restored  Subcutaneous Fat Loss: Unable to assess  Muscle Loss: Unable to assess  Fluid Accumulation/Edema: Does not meet criteria  Malnutrition Diagnosis: Patient does not meet two of the established criteria necessary for diagnosing malnutrition    Previous Goals   Patient to consume % of nutritionally adequate meal trays TID, or the equivalent with supplements/snacks  Evaluation: partially met, intake not consistent    Previous Nutrition Diagnosis  Predicted inadequate nutrient intake related to AMS, dentition and weakness post COVID  Evaluation: No change    CURRENT NUTRITION DIAGNOSIS  Predicted inadequate nutrient intake (calorie, protein) related to AMS as evidenced by widely variable po intake - 0 to 100% meals      INTERVENTIONS  Implementation  Medical food supplement therapy - continue Magic Cup BID    Goals  Patient to consume % of nutritionally adequate meal trays TID, or the equivalent with supplements/snacks.  Maintain weight > 74  kg    Monitoring/Evaluation  Progress toward goals will be monitored and evaluated per protocol.    Kamila Jackson RD, LD  Unit pager [phone]

## 2022-02-08 LAB
ALBUMIN SERPL-MCNC: 3.1 G/DL (ref 3.4–5)
ALP SERPL-CCNC: 81 U/L (ref 40–150)
ALT SERPL W P-5'-P-CCNC: 140 U/L (ref 0–70)
ANION GAP SERPL CALCULATED.3IONS-SCNC: 7 MMOL/L (ref 3–14)
AST SERPL W P-5'-P-CCNC: 207 U/L (ref 0–45)
BILIRUB SERPL-MCNC: 0.3 MG/DL (ref 0.2–1.3)
BUN SERPL-MCNC: 16 MG/DL (ref 7–30)
CALCIUM SERPL-MCNC: 8.6 MG/DL (ref 8.5–10.1)
CHLORIDE BLD-SCNC: 115 MMOL/L (ref 94–109)
CO2 SERPL-SCNC: 20 MMOL/L (ref 20–32)
CREAT SERPL-MCNC: 0.58 MG/DL (ref 0.66–1.25)
GFR SERPL CREATININE-BSD FRML MDRD: >90 ML/MIN/1.73M2
GLUCOSE BLD-MCNC: 101 MG/DL (ref 70–99)
INR PPP: 1.01 (ref 0.86–1.14)
POTASSIUM BLD-SCNC: 3.8 MMOL/L (ref 3.4–5.3)
PROT SERPL-MCNC: 7.1 G/DL (ref 6.8–8.8)
SODIUM SERPL-SCNC: 142 MMOL/L (ref 133–144)

## 2022-02-08 PROCEDURE — 250N000013 HC RX MED GY IP 250 OP 250 PS 637: Performed by: PSYCHIATRY & NEUROLOGY

## 2022-02-08 PROCEDURE — 36415 COLL VENOUS BLD VENIPUNCTURE: CPT | Performed by: PHYSICIAN ASSISTANT

## 2022-02-08 PROCEDURE — 80053 COMPREHEN METABOLIC PANEL: CPT | Performed by: PHYSICIAN ASSISTANT

## 2022-02-08 PROCEDURE — 124N000003 HC R&B MH SENIOR/ADOLESCENT

## 2022-02-08 PROCEDURE — 85610 PROTHROMBIN TIME: CPT | Performed by: PHYSICIAN ASSISTANT

## 2022-02-08 RX ADMIN — CLOZAPINE 200 MG: 100 TABLET ORAL at 20:27

## 2022-02-08 RX ADMIN — GABAPENTIN 100 MG: 100 CAPSULE ORAL at 17:35

## 2022-02-08 RX ADMIN — LEVOTHYROXINE SODIUM 88 MCG: 0.09 TABLET ORAL at 09:01

## 2022-02-08 RX ADMIN — DONEPEZIL HYDROCHLORIDE 10 MG: 10 TABLET ORAL at 20:28

## 2022-02-08 RX ADMIN — BUSPIRONE HYDROCHLORIDE 30 MG: 15 TABLET ORAL at 09:00

## 2022-02-08 RX ADMIN — BUSPIRONE HYDROCHLORIDE 30 MG: 15 TABLET ORAL at 20:27

## 2022-02-08 RX ADMIN — GABAPENTIN 100 MG: 100 CAPSULE ORAL at 09:01

## 2022-02-08 RX ADMIN — MEGESTROL ACETATE 20 MG: 20 TABLET ORAL at 09:01

## 2022-02-08 RX ADMIN — SALMETEROL XINAFOATE 1 PUFF: 50 POWDER, METERED ORAL; RESPIRATORY (INHALATION) at 20:29

## 2022-02-08 RX ADMIN — MIRTAZAPINE 15 MG: 15 TABLET, FILM COATED ORAL at 20:28

## 2022-02-08 RX ADMIN — ASPIRIN 81 MG: 81 TABLET, COATED ORAL at 09:01

## 2022-02-08 RX ADMIN — GABAPENTIN 100 MG: 100 CAPSULE ORAL at 11:55

## 2022-02-08 RX ADMIN — SALMETEROL XINAFOATE 1 PUFF: 50 POWDER, METERED ORAL; RESPIRATORY (INHALATION) at 09:02

## 2022-02-08 RX ADMIN — HALOPERIDOL 1 MG: 1 TABLET ORAL at 20:28

## 2022-02-08 RX ADMIN — MEMANTINE 10 MG: 10 TABLET ORAL at 09:01

## 2022-02-08 RX ADMIN — MEMANTINE 10 MG: 10 TABLET ORAL at 20:28

## 2022-02-08 ASSESSMENT — ACTIVITIES OF DAILY LIVING (ADL)
HYGIENE/GROOMING: PROMPTS;WITH ASSISTANCE
ORAL_HYGIENE: PROMPTS;WITH ASSISTANCE

## 2022-02-08 NOTE — PLAN OF CARE
Problem: Sleep Disturbance (Psychotic Signs/Symptoms)  Goal: Improved Sleep (Psychotic Signs/Symptoms)  Outcome: No Change      Patient slept a total of 9.75 hours without any concerns raised the whole shift. No incontinence of urine or stool noted.     Patient is scheduled for CMP draw and Pharm consult today.

## 2022-02-08 NOTE — PHARMACY-CONSULT NOTE
Pharmacy Consult:     Pharmacist was consulted by Kim Harman PA-C to evaluate for medications that may cause elevated liver enzymes.     Date ALT (U/L) AST (U/L)   2/8/2022 140 207   2/7/2022 141 212   1/21/2022 15 32   1/13/2022 59 69   11/7/2021 32 74   Alk Phos and bilirubin have always been WNL.    Current medication list as of 2/8/2022:  Current Facility-Administered Scheduled Medications    Medication Dose Route Frequency      aspirin  81 mg Oral Daily      [Held by provider] atorvastatin  80 mg Oral At Bedtime      busPIRone HCl  30 mg Oral BID      cloZAPine  200 mg Oral At Bedtime      donepezil  10 mg Oral At Bedtime      gabapentin  100 mg Oral TID w/meals      haloperidol  1 mg Oral At Bedtime      levothyroxine  88 mcg Oral Daily      megestrol  20 mg Oral Daily      memantine  10 mg Oral BID      mirtazapine  15 mg Oral At Bedtime      [Held by provider] polyethylene glycol  17 g Oral BID      salmeterol  1 puff Inhalation BID      Current Facility-Administered As Needed Medications    ** Please note, no PRN doses given of any medications since readmission on 1/26/2022. **     Medication Dose Route Frequency     [Held by provider] acetaminophen  650 mg Oral Q4H PRN     albuterol  1-2 puff Inhalation Q4H PRN     alum & mag hydroxide-simethicone  30 mL Oral Q4H PRN     benzocaine-menthol  1 lozenge Buccal Q1H PRN     hydrOXYzine  25 mg Oral Q4H PRN     nicotine  2 mg Buccal Q1H PRN     polyethylene glycol-propylene glycol PF  1 drop Both Eyes Q1H PRN     risperiDONE  1 mg Sublingual Q6H PRN     senna-docusate  2 tablet Oral At Bedtime PRN     traZODone  50 mg Oral At Bedtime PRN     Assessment:   Livertox was utilized to assess the likelihood of medications causing transaminitis and/or hepatoxicity.     Low Risk:  1) Aspirin (at low doses)  2) Buspirone  3) Gabapentin (at low doses):   4) Donepezil: Case reports with typical onset of 1 to 6 weeks  5) Memantine: Case reports with time to onset of 3  weeks     Medium Risk:  1) Mirtazapine:   Occur in up to 10% of patients, elevations are usually modest and do not require dose modification or discontinuation.  Onset varied from several months to several years.     Higher Risk  1) Atorvastatin:   Mild, asymptomatic and usually transient serum aminotransferase elevations occurs 1% to 3% but levels above 3 times ULN in less than 1%.  Higher doses, which patient is currently taking 80mg PO HS and a higher risker. Most elevations were self-limited and did not require dose modification.     2) Clozapine:   Serum LFT elevations can occur in up to two-thirds of patients, but are usually modest and resolve within 6 to 12 weeks.      3) Haloperidol:  Liver test abnormalities have occurred in up to 20% in patients on long term therapy but uncommonly above 3 times the upper limit of normal. Onset is usually within 2 to 6 weeks. Of note, patient is only on 1mg PO HS.       Assessment:   1) Consider holding atorvastatin until LFTs normalize.   2) Consider discontinuing PRN acetaminophen and PRN trazodone (no doses given since at least 1/26).  3) Defer to psychiatry for clozapine taper to weigh benefits vs. risks if LFTs do not normalize once atorvastatin is held.       Lynda Ross, Pharm.D., [facility]  Behavioral Health Inpatient Pharmacist  [facility] ([facility]) Emergency Department  Phone: *[phone] ([employer]match) or [phone]

## 2022-02-08 NOTE — PLAN OF CARE
Problem: Behavior Regulation Impairment (Psychotic Signs/Symptoms)  Goal: Improved Behavioral Control (Psychotic Signs/Symptoms)  Outcome: No Change     Problem: Mood Impairment (Psychotic Signs/Symptoms)  Goal: Improved Mood Symptoms (Psychotic Signs/Symptoms)  Outcome: No Change   Pt continues to isolate to his room. Pt's affect is flat with inconsistent eye contact. Pt is oriented to self. Pt asked writer this AM when he would be able to go home. Pt has been out for meals and eaten approximately 50% of both meals.   Pt denies pain. Pt noted to have been incontinent of a medium unformed stool this AM.

## 2022-02-08 NOTE — PROGRESS NOTES
"Brief Medicine Note    Medicine following for LFT monitoring. CMP today with stable  (212) and  (141). Assessed patient and asymptomatic. Denies abdominal pain, N/V. No fever/chills.     /80 (BP Location: Left arm)   Pulse 96   Temp 98.9  F (37.2  C) (Oral)   Resp 16   Ht 1.778 m (5' 10\")   Wt 74.1 kg (163 lb 6.4 oz)   SpO2 98%   BMI 23.45 kg/m    GENERAL: Alert and oriented x 3. Appears comfortable.    HEENT: Anicteric sclera. Mucous membranes moist.   RESPIRATORY: Effort normal on room air.   GI: Abdomen soft and non distended, bowel sounds present. No tenderness, rebound, guarding.    A/P:  # Transaminitis  # Hepatitis C:  Patient has waxing and waning LFT elevations per chart review. In 7/2021 had similar LFT elevations and US performed at that time notable for hepatosteatosis or cirrhosis without focal liver lesion with antegrade flow of splenic and portal veins. During 7/2021, statin had been held with improvement in LFTs. Had hx of HCV s/p treatment; however, RNA in 7/2021 was 722K noting ongoing infection. Today CMP stable. INR not elevated.  - Will need follow-up with Hepatology upon discharge for hepatitis C treatment and given e/o hepatosteatosis vs cirrhosis on imaging  - Holding atorvastatin since 2/7  - Discontinued tylenol on 2/7  - Trend CMP on 2/10  - If ongoing elevation of LFTs; discussed with psychiatry to consider dose decrease of Clozaril, however, this medication is important to psychiatric stability per Psychiatry team, therefore holding off on dose changes pending LFT trends     Medicine will continue to follow peripherally, please notify Medicine with any additional questions or concerns.    Kim Harman PA-C  Hospitalist Service  Pager [phone]      "

## 2022-02-08 NOTE — PLAN OF CARE
Patient has spent the shift in his room. He did come to the dining room for dinner only. He ate 75%. He had 740cc of fluid intake with strong encouragement.  His affect is flat. His mood is calm. He denies SI and SIB. He denies all MH symptoms. He is alert to self only. He had 3 formed incontinent stools this shift. Miralax on hold. His BP was 119/82. No c/o of dizziness this shift. Labs were drawn this shift-see results. AST and ALT elevated. Medication changes made by provider. He is not social with peers or staff. He refused to attend groups this evening. He is medication compliant. Denies pain. Sleep is good. Patient voices no concerns.

## 2022-02-09 PROCEDURE — 124N000003 HC R&B MH SENIOR/ADOLESCENT

## 2022-02-09 PROCEDURE — 250N000013 HC RX MED GY IP 250 OP 250 PS 637: Performed by: PSYCHIATRY & NEUROLOGY

## 2022-02-09 PROCEDURE — 99233 SBSQ HOSP IP/OBS HIGH 50: CPT | Performed by: PSYCHIATRY & NEUROLOGY

## 2022-02-09 RX ADMIN — MIRTAZAPINE 15 MG: 15 TABLET, FILM COATED ORAL at 20:05

## 2022-02-09 RX ADMIN — SALMETEROL XINAFOATE 1 PUFF: 50 POWDER, METERED ORAL; RESPIRATORY (INHALATION) at 20:05

## 2022-02-09 RX ADMIN — GABAPENTIN 100 MG: 100 CAPSULE ORAL at 11:51

## 2022-02-09 RX ADMIN — GABAPENTIN 100 MG: 100 CAPSULE ORAL at 17:23

## 2022-02-09 RX ADMIN — MEGESTROL ACETATE 20 MG: 20 TABLET ORAL at 09:06

## 2022-02-09 RX ADMIN — DONEPEZIL HYDROCHLORIDE 10 MG: 10 TABLET ORAL at 20:05

## 2022-02-09 RX ADMIN — BUSPIRONE HYDROCHLORIDE 30 MG: 15 TABLET ORAL at 09:04

## 2022-02-09 RX ADMIN — ASPIRIN 81 MG: 81 TABLET, COATED ORAL at 09:05

## 2022-02-09 RX ADMIN — GABAPENTIN 100 MG: 100 CAPSULE ORAL at 09:05

## 2022-02-09 RX ADMIN — BUSPIRONE HYDROCHLORIDE 30 MG: 15 TABLET ORAL at 20:05

## 2022-02-09 RX ADMIN — MEMANTINE 10 MG: 10 TABLET ORAL at 20:05

## 2022-02-09 RX ADMIN — HALOPERIDOL 1 MG: 1 TABLET ORAL at 20:05

## 2022-02-09 RX ADMIN — LEVOTHYROXINE SODIUM 88 MCG: 0.09 TABLET ORAL at 09:05

## 2022-02-09 RX ADMIN — SALMETEROL XINAFOATE 1 PUFF: 50 POWDER, METERED ORAL; RESPIRATORY (INHALATION) at 09:04

## 2022-02-09 RX ADMIN — CLOZAPINE 200 MG: 100 TABLET ORAL at 20:05

## 2022-02-09 RX ADMIN — MEMANTINE 10 MG: 10 TABLET ORAL at 09:05

## 2022-02-09 ASSESSMENT — ACTIVITIES OF DAILY LIVING (ADL)
HYGIENE/GROOMING: PROMPTS;WITH ASSISTANCE
ORAL_HYGIENE: PROMPTS
LAUNDRY: UNABLE TO COMPLETE
DRESS: PROMPTS;SCRUBS (BEHAVIORAL HEALTH)

## 2022-02-09 NOTE — PLAN OF CARE
"Problem: Behavior Regulation Impairment (Psychotic Signs/Symptoms)  Goal: Improved Behavioral Control (Psychotic Signs/Symptoms)  Outcome: Improving  Flowsheets (Taken 2/8/2022 2210)  Mutually Determined Action Steps (Improved Behavioral Control): verbalizes gratifying activity    The patient spent the evening isolated to his room and kept to himself when in the milieu. He appeared with a flat, blunted affect and was calm and cooperative. No incidence of bowel movement was observed or reported. When asked if he had a bowel movement, he denied saying, \" I ain't shit in 2 days.\" He was medication compliant, ate a fair portion of his dinner, and had an adequate fluid intake.  "

## 2022-02-09 NOTE — PLAN OF CARE
Problem: Sleep Disturbance (Psychotic Signs/Symptoms)  Goal: Improved Sleep (Psychotic Signs/Symptoms)  Outcome: No Change      Patient slept for a total of 9.5 hours. No concerns raised the whole shift.

## 2022-02-09 NOTE — PLAN OF CARE
Problem: Behavior Regulation Impairment (Psychotic Signs/Symptoms)  Goal: Improved Behavioral Control (Psychotic Signs/Symptoms)  Outcome: No Change     Problem: Decreased Participation and Engagement (Psychotic Signs/Symptoms)  Goal: Increased Participation and Engagement (Psychotic Signs/Symptoms)  Outcome: No Change     Problem: Psychomotor Impairment (Psychotic Signs/Symptoms)  Goal: Improved Psychomotor Symptoms (Psychotic Signs/Symptoms)  Outcome: No Change  Flowsheets (Taken 2/9/2022 1311)  Mutually Determined Action Steps (Improved Psychomotor Symptoms): adheres to medication regimen   Pt continues to isolative to his room. Pt's affect is flat and his eye contact is inconsistent. Pt responses to questions are also inconsistent and at times he will just look at staff. Pt will also mumble answers at times.   Pt has been medication compliant. Pt declined to come out for breakfast. Pt did come out briefly for lunch. Pt only took about 30-40% of solids this shift. Pt has taken 2 cartons of milk and 2 Ensures.   Pt noted to have been incontinent of a moderate amount of soft formed stool (dirty pull up on bathroom floor). Pt had changed  his clothing independently prior to staff discovering this and he declined to do jeanie care at that time.

## 2022-02-09 NOTE — PROGRESS NOTES
"[facility], [place]   Psychiatric Progress Note        Interim History:   The patient's care was discussed with the treatment team during the daily team meeting and/or staff's chart notes were reviewed.  Staff report patient has been about the same. Is better groomed with a haircut and a beard trim.     The patient reports that he is \"all right.\" Says that he slept \"like crap.\" Eating \"all right.\" Denies SI or HI. Denies AH or VH. Discussed the medications being held. Patient encouraged to continue to eat and drink.          Medications:       aspirin  81 mg Oral Daily     [Held by provider] atorvastatin  80 mg Oral At Bedtime     busPIRone HCl  30 mg Oral BID     cloZAPine  200 mg Oral At Bedtime     donepezil  10 mg Oral At Bedtime     gabapentin  100 mg Oral TID w/meals     haloperidol  1 mg Oral At Bedtime     levothyroxine  88 mcg Oral Daily     megestrol  20 mg Oral Daily     memantine  10 mg Oral BID     mirtazapine  15 mg Oral At Bedtime     [Held by provider] polyethylene glycol  17 g Oral BID     salmeterol  1 puff Inhalation BID          Allergies:     Allergies   Allergen Reactions     Ibuprofen      Latex           Labs:     No results found for this or any previous visit (from the past 24 hour(s)).       Psychiatric Examination:     BP 98/67 (BP Location: Right arm, Patient Position: Supine)   Pulse 95   Temp 97.5  F (36.4  C) (Oral)   Resp 16   Ht 1.778 m (5' 10\")   Wt 74.1 kg (163 lb 6.4 oz)   SpO2 99%   BMI 23.45 kg/m    Weight is 163 lbs 6.4 oz  Body mass index is 23.45 kg/m .  Orthostatic Vitals  Report      Most Recent      Standing Orthostatic /71 01/31 0819    Standing Orthostatic Pulse (bpm) 116 01/31 0819            Appearance: awake, alert and adequately groomed  Attitude:  more cooperative  Eye Contact:  good  Mood:  \"fine\"  Affect:  intensity is blunted  Speech:  paucity of speech, more verbal  Psychomotor Behavior:  no evidence of tardive " dyskinesia, dystonia, or tics  Thought Process:  goal oriented  Associations:  no loose associations  Thought Content:  no evidence of suicidal ideation or homicidal ideation  Insight:  limited  Judgement:  limited  Oriented to:  person only  Attention Span and Concentration:  fair  Recent and Remote Memory:  poor    Clinical Global Impressions  First:  Considering your total clinical experience with this particular patient population, how severe are the patient's symptoms at this time?: 6 (01/27/22 2157)  Compared to the patient's condition at the START of treatment, this patient's condition is: 3 (01/27/22 2157)  Most recent:  Considering your total clinical experience with this particular patient population, how severe are the patient's symptoms at this time?: 6 (01/27/22 2157)  Compared to the patient's condition at the START of treatment, this patient's condition is: 3 (01/27/22 2157)         Precautions:     Behavioral Orders   Procedures     Code 1 - Restrict to Unit     Routine Programming     As clinically indicated     Status 15     Every 15 minutes.          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  He was hospitalized at [facility] for approximately one year and then transferred to [facility] 3B mental health unit for ECT treatment where he was hospitalized 6/30/21 - 1/13/22 until it was discovered that he was COVID-19 positive. He was subsequently transferred to medicine service until 1/19/22 and then discharged to [facility] COVID-19 unit until medically stable. He was  readmitted to geriatric unit, station 3B, on 1/26/22. He is currently under commitment with Yanez being amended to include Clozaril.   Patient has been on Zyprexa, Haldol, Risperdal that were not effective. He is confused, disoriented and endorses visual hallucinations, however he denied auditory hallucinations. He is oriented to self only. Patient is unable to take care of himself in the community and the plan will be for the patient to have a guardian moving forward. Inpatient psychiatric hospitalization is warranted at this time for safety, stabilization, and possible adjustment in medications.     Target psychiatric symptoms and interventions:  Continue PTA medications, including:     BuSpar 30 mg 2 times daily  Clozaril 200 mg at bedtime  Aricept 10 mg at bedtime  Gabapentin 100 mg 3 times a day  Haldol 1 mg at bedtime  Namenda 10 mg 2 times a day  Remeron 15 mg at bedtime     Continue hydroxyzine 25 mg q4h prn for acute anxiety  Continue risperidone 1 mg q6h prn for severe agitation    Medicine following elevated LFTs. Statin, APAP and Trazodone held.     Disposition Plan   Reason for ongoing admission: is unable to care for self due to severe psychosis or mariusz  Discharge location: [facility]  Discharge Medications: not ordered  Follow-up Appointments: not scheduled  Legal Status: full commitment    Entered by: Wilton Morfin on February 9, 2022 at 11:27 AM

## 2022-02-09 NOTE — PLAN OF CARE
Called [facility] at [phone]. Patient is on the waitlist and his preferred program is [facility]. Wait for [facility] cannot be predicted, but screening may select him to screen him for other Direct Care and Treatment facilities.    Attempted to call patient's sister, Freida Machuca at [phone] however the mailbox was full. CTC intended to inquire if patient had other family members who may be willing to be appointed his guardian.    Left a message with Lisa at [facility] court after calling the main phone number and being transferred to her voice mail. [phone]  Requesting return call to this CTC regarding guardianship resources within the [facility] we could utilize to get him discharged.

## 2022-02-10 LAB
ALBUMIN SERPL-MCNC: 3.2 G/DL (ref 3.4–5)
ALP SERPL-CCNC: 74 U/L (ref 40–150)
ALT SERPL W P-5'-P-CCNC: 136 U/L (ref 0–70)
ANION GAP SERPL CALCULATED.3IONS-SCNC: 4 MMOL/L (ref 3–14)
AST SERPL W P-5'-P-CCNC: 169 U/L (ref 0–45)
BILIRUB SERPL-MCNC: 0.3 MG/DL (ref 0.2–1.3)
BUN SERPL-MCNC: 19 MG/DL (ref 7–30)
CALCIUM SERPL-MCNC: 9.1 MG/DL (ref 8.5–10.1)
CHLORIDE BLD-SCNC: 115 MMOL/L (ref 94–109)
CO2 SERPL-SCNC: 22 MMOL/L (ref 20–32)
CREAT SERPL-MCNC: 0.58 MG/DL (ref 0.66–1.25)
GFR SERPL CREATININE-BSD FRML MDRD: >90 ML/MIN/1.73M2
GLUCOSE BLD-MCNC: 144 MG/DL (ref 70–99)
POTASSIUM BLD-SCNC: 3.8 MMOL/L (ref 3.4–5.3)
PROT SERPL-MCNC: 7.3 G/DL (ref 6.8–8.8)
SODIUM SERPL-SCNC: 141 MMOL/L (ref 133–144)

## 2022-02-10 PROCEDURE — 36415 COLL VENOUS BLD VENIPUNCTURE: CPT | Performed by: PHYSICIAN ASSISTANT

## 2022-02-10 PROCEDURE — 99233 SBSQ HOSP IP/OBS HIGH 50: CPT | Performed by: PSYCHIATRY & NEUROLOGY

## 2022-02-10 PROCEDURE — 124N000003 HC R&B MH SENIOR/ADOLESCENT

## 2022-02-10 PROCEDURE — 250N000013 HC RX MED GY IP 250 OP 250 PS 637: Performed by: PSYCHIATRY & NEUROLOGY

## 2022-02-10 PROCEDURE — 80053 COMPREHEN METABOLIC PANEL: CPT | Performed by: PHYSICIAN ASSISTANT

## 2022-02-10 RX ADMIN — DONEPEZIL HYDROCHLORIDE 10 MG: 10 TABLET ORAL at 20:25

## 2022-02-10 RX ADMIN — MEMANTINE 10 MG: 10 TABLET ORAL at 20:25

## 2022-02-10 RX ADMIN — BUSPIRONE HYDROCHLORIDE 30 MG: 15 TABLET ORAL at 20:25

## 2022-02-10 RX ADMIN — GABAPENTIN 100 MG: 100 CAPSULE ORAL at 17:54

## 2022-02-10 RX ADMIN — GABAPENTIN 100 MG: 100 CAPSULE ORAL at 12:23

## 2022-02-10 RX ADMIN — CLOZAPINE 200 MG: 100 TABLET ORAL at 20:25

## 2022-02-10 RX ADMIN — MEGESTROL ACETATE 20 MG: 20 TABLET ORAL at 08:11

## 2022-02-10 RX ADMIN — SALMETEROL XINAFOATE 1 PUFF: 50 POWDER, METERED ORAL; RESPIRATORY (INHALATION) at 20:25

## 2022-02-10 RX ADMIN — MIRTAZAPINE 15 MG: 15 TABLET, FILM COATED ORAL at 20:25

## 2022-02-10 RX ADMIN — HALOPERIDOL 1 MG: 1 TABLET ORAL at 20:25

## 2022-02-10 RX ADMIN — ASPIRIN 81 MG: 81 TABLET, COATED ORAL at 08:11

## 2022-02-10 RX ADMIN — BUSPIRONE HYDROCHLORIDE 30 MG: 15 TABLET ORAL at 08:12

## 2022-02-10 RX ADMIN — SALMETEROL XINAFOATE 1 PUFF: 50 POWDER, METERED ORAL; RESPIRATORY (INHALATION) at 08:10

## 2022-02-10 RX ADMIN — LEVOTHYROXINE SODIUM 88 MCG: 0.09 TABLET ORAL at 08:11

## 2022-02-10 RX ADMIN — MEMANTINE 10 MG: 10 TABLET ORAL at 08:11

## 2022-02-10 RX ADMIN — GABAPENTIN 100 MG: 100 CAPSULE ORAL at 08:11

## 2022-02-10 ASSESSMENT — ACTIVITIES OF DAILY LIVING (ADL)
HYGIENE/GROOMING: PROMPTS;WITH ASSISTANCE
LAUNDRY: UNABLE TO COMPLETE
ORAL_HYGIENE: INDEPENDENT
HYGIENE/GROOMING: INDEPENDENT
DRESS: SCRUBS (BEHAVIORAL HEALTH);INDEPENDENT
LAUNDRY: UNABLE TO COMPLETE
DRESS: PROMPTS;SCRUBS (BEHAVIORAL HEALTH);WITH ASSISTANCE
ORAL_HYGIENE: PROMPTS

## 2022-02-10 NOTE — PLAN OF CARE
"  Problem: Behavior Regulation Impairment (Psychotic Signs/Symptoms)  Goal: Improved Behavioral Control (Psychotic Signs/Symptoms)  Outcome: No Change     Pt has been pleasant during interactions. Pt's affect remains flat and his eye contact is inconsistent. Pt's responses are mumbled and superficial. Pt did ask writer when he would be able to \"leave this place\" this morning. Pt continues to isolate to his room. Pt decline to come out for breakfast. Pt ate 100% of his Pashto toast and 100% of his fried potatoes when his tray was brought to his room. Pt has taken 2 containers of boost and 3 containers of milk this shift. Pt ate 50% of his pudding and 100% of his magic cup, and 50% of his chicken noodle soup at lunch.    "

## 2022-02-10 NOTE — PROGRESS NOTES
Pt evaluation continues. Assessed mood, anxiety, thoughts and behavior.Encourage participation in groups and developing health coping skills.Pt was observed napping in his room, isolative/ withdrawn. Pleasant upon approach. Pt rambles when talking to him. Denies all MH issues. Denies all pain Med compliant Will continue to assess. Refer to daily team meeting notes for individualized plan of care.

## 2022-02-10 NOTE — PLAN OF CARE
Assessment/Intervention/Current Symtoms and Care Coordination  The patient's care was discussed with the treatment team and chart notes were reviewed   -[facility] attempted to reach out to the patient . [facility] was notified that patient's CM is currently on vacation.     Discharge Plan or Goal   Currently being formulated due to guardianship difficulties and ongoing stabilization     Barriers to Discharge   Needs continued psychiatric assessment and stabilization.     Referral Status  [facility]    Legal Status  Commitment and Jarvised. Court Hold     Contacts:  [facility]:  Ruth Garcia, RN, BSN, [place], [facility]  President at [facility]  Phone: [phone] ([place])  Fax: [phone] ([place])  Mobile: [phone]  Web: [email or web address]  Email:  [email or web address]

## 2022-02-10 NOTE — PLAN OF CARE
"Assessment/Intervention/Current Symtoms and Care Coordination  The patient's care was discussed with the treatment team and chart notes were reviewed   -[facility] attempted to reach out to the patient . [facility] was notified that patient's CM is currently on vacation.     [facility] received an email from NANCY Ramos stating \"I wanted to provide an update in regards to guardianship options for ES. Unfortunately, I was notified by my contact at [facility] that they do not feel they would be able to take on the role of guardian for ES at this time. I am unsure as to where this leaves the situation for ES, but wanted to make sure this group was aware of the most up to date information.\"     Discharge Plan or Goal   Currently being formulated due to guardianship difficulties and ongoing stabilization     Barriers to Discharge   Needs continued psychiatric assessment and stabilization.     Referral Status  [facility], [facility]  Legal Status  Commitment and Jarvised. Court Hold     Contacts:  [facility]:  Ruth Garcia RN, BSN, [place], [facility]  President at [facility]  Phone: [phone] ([place])  Fax: [phone] ([place])  Mobile: [phone]  Web: [email or web address]  Email:  [email or web address]              "

## 2022-02-10 NOTE — PROGRESS NOTES
"[facility], [place]   Psychiatric Progress Note        Interim History:   The patient's care was discussed with the treatment team during the daily team meeting and/or staff's chart notes were reviewed.  Staff report patient has been about the same. May have an option for a guardian.     The patient reports that he is \"all right.\" Mood is \"all right.\" Sleeping well. Plans to eat some eggs with breakfast. Denies SI or HI. Denies AH.          Medications:       aspirin  81 mg Oral Daily     [Held by provider] atorvastatin  80 mg Oral At Bedtime     busPIRone HCl  30 mg Oral BID     cloZAPine  200 mg Oral At Bedtime     donepezil  10 mg Oral At Bedtime     gabapentin  100 mg Oral TID w/meals     haloperidol  1 mg Oral At Bedtime     levothyroxine  88 mcg Oral Daily     megestrol  20 mg Oral Daily     memantine  10 mg Oral BID     mirtazapine  15 mg Oral At Bedtime     [Held by provider] polyethylene glycol  17 g Oral BID     salmeterol  1 puff Inhalation BID          Allergies:     Allergies   Allergen Reactions     Ibuprofen      Latex           Labs:     Recent Results (from the past 24 hour(s))   Comprehensive metabolic panel    Collection Time: 02/10/22 10:21 AM   Result Value Ref Range    Sodium 141 133 - 144 mmol/L    Potassium 3.8 3.4 - 5.3 mmol/L    Chloride 115 (H) 94 - 109 mmol/L    Carbon Dioxide (CO2) 22 20 - 32 mmol/L    Anion Gap 4 3 - 14 mmol/L    Urea Nitrogen 19 7 - 30 mg/dL    Creatinine 0.58 (L) 0.66 - 1.25 mg/dL    Calcium 9.1 8.5 - 10.1 mg/dL    Glucose 144 (H) 70 - 99 mg/dL    Alkaline Phosphatase 74 40 - 150 U/L     (H) 0 - 45 U/L     (H) 0 - 70 U/L    Protein Total 7.3 6.8 - 8.8 g/dL    Albumin 3.2 (L) 3.4 - 5.0 g/dL    Bilirubin Total 0.3 0.2 - 1.3 mg/dL    GFR Estimate >90 >60 mL/min/1.73m2          Psychiatric Examination:     /71   Pulse 112   Temp 98.6  F (37  C) (Oral)   Resp 16   Ht 1.778 m (5' 10\")   Wt 74.1 kg (163 lb 6.4 oz)   SpO2 " "98%   BMI 23.45 kg/m    Weight is 163 lbs 6.4 oz  Body mass index is 23.45 kg/m .  Orthostatic Vitals  Report      Most Recent      Standing Orthostatic /71 01/31 0819    Standing Orthostatic Pulse (bpm) 116 01/31 0819            Appearance: awake, alert and adequately groomed  Attitude:  cooperative  Eye Contact:  good  Mood:  \"all right\"  Affect:  intensity is blunted  Speech:  paucity of speech, more verbal  Psychomotor Behavior:  no evidence of tardive dyskinesia, dystonia, or tics  Thought Process:  goal oriented  Associations:  no loose associations  Thought Content:  no evidence of suicidal ideation or homicidal ideation  Insight:  limited  Judgement:  limited  Oriented to:  person only  Attention Span and Concentration:  fair  Recent and Remote Memory:  poor    Clinical Global Impressions  First:  Considering your total clinical experience with this particular patient population, how severe are the patient's symptoms at this time?: 6 (01/27/22 2157)  Compared to the patient's condition at the START of treatment, this patient's condition is: 3 (01/27/22 2157)  Most recent:  Considering your total clinical experience with this particular patient population, how severe are the patient's symptoms at this time?: 6 (01/27/22 2157)  Compared to the patient's condition at the START of treatment, this patient's condition is: 3 (01/27/22 2157)         Precautions:     Behavioral Orders   Procedures     Code 1 - Restrict to Unit     Routine Programming     As clinically indicated     Status 15     Every 15 minutes.          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  [facility] and then [facility] 3[facility] health unit for ECT treatment where he was hospitalized 6/30/21 - 1/13/22 until it was discovered that he was COVID-19 positive. He was subsequently transferred to medicine service until 1/19/22 and then discharged to [facility] unit until medically stable. He was readmitted to geriatric unit, station 3B, on 1/26/22. He is currently under commitment with Yanez being amended to include Clozaril.   Patient has been on Zyprexa, Haldol, Risperdal that were not effective. He is confused, disoriented and endorses visual hallucinations, however he denied auditory hallucinations. He is oriented to self only. Patient is unable to take care of himself in the community and the plan will be for the patient to have a guardian moving forward. Inpatient psychiatric hospitalization is warranted at this time for safety, stabilization, and possible adjustment in medications.     Target psychiatric symptoms and interventions:  Continue PTA medications, including:     BuSpar 30 mg 2 times daily  Clozaril 200 mg at bedtime  Aricept 10 mg at bedtime  Gabapentin 100 mg 3 times a day  Haldol 1 mg at bedtime  Namenda 10 mg 2 times a day  Remeron 15 mg at bedtime     Continue hydroxyzine 25 mg q4h prn for acute anxiety  Continue risperidone 1 mg q6h prn for severe agitation    Medicine following elevated LFTs. Statin, APAP and Trazodone held.     Disposition Plan   Reason for ongoing admission: is unable to care for self due to severe psychosis or mariusz  Discharge location: group home  Discharge Medications: not ordered  Follow-up Appointments: not scheduled  Legal Status: full commitment    Entered by: Wilton Morfin on February 10, 2022 at 11:49 AM

## 2022-02-10 NOTE — PLAN OF CARE
Problem: Sleep Disturbance (Psychotic Signs/Symptoms)  Goal: Improved Sleep (Psychotic Signs/Symptoms)  Outcome: No Change      Patient slept a total of 10.5 hours without any concerns raised the whole shift.

## 2022-02-10 NOTE — PROGRESS NOTES
Brief Medicine Note    Medicine following for LFT elevation.  (207),  (140). Improving. Will continue to hold Atorvastatin. Will need follow up with Hepatology for discussion/treatment of hepatitis C. If no improvement with LFTs, psychiatry aware that they may need to change clozaril dose, but per [facility], LFT changes due to clozaril are usually transient, and patient has been on this medication chronically. Please see note from 2/8 for complete details.     Will trend CMP on 2/12.     Please notify Medicine with any additional questions or concerns.     Kim Harman PA-C  Hospitalist Service  Pager [phone]

## 2022-02-11 PROCEDURE — 124N000003 HC R&B MH SENIOR/ADOLESCENT

## 2022-02-11 PROCEDURE — 250N000013 HC RX MED GY IP 250 OP 250 PS 637: Performed by: PSYCHIATRY & NEUROLOGY

## 2022-02-11 PROCEDURE — 99233 SBSQ HOSP IP/OBS HIGH 50: CPT | Performed by: PSYCHIATRY & NEUROLOGY

## 2022-02-11 RX ADMIN — ASPIRIN 81 MG: 81 TABLET, COATED ORAL at 08:11

## 2022-02-11 RX ADMIN — MEGESTROL ACETATE 20 MG: 20 TABLET ORAL at 08:11

## 2022-02-11 RX ADMIN — GABAPENTIN 100 MG: 100 CAPSULE ORAL at 08:11

## 2022-02-11 RX ADMIN — MIRTAZAPINE 15 MG: 15 TABLET, FILM COATED ORAL at 20:01

## 2022-02-11 RX ADMIN — LEVOTHYROXINE SODIUM 88 MCG: 0.09 TABLET ORAL at 08:11

## 2022-02-11 RX ADMIN — HALOPERIDOL 1 MG: 1 TABLET ORAL at 20:01

## 2022-02-11 RX ADMIN — CLOZAPINE 200 MG: 100 TABLET ORAL at 20:01

## 2022-02-11 RX ADMIN — SALMETEROL XINAFOATE 1 PUFF: 50 POWDER, METERED ORAL; RESPIRATORY (INHALATION) at 08:11

## 2022-02-11 RX ADMIN — MEMANTINE 10 MG: 10 TABLET ORAL at 20:01

## 2022-02-11 RX ADMIN — BUSPIRONE HYDROCHLORIDE 30 MG: 15 TABLET ORAL at 20:01

## 2022-02-11 RX ADMIN — BUSPIRONE HYDROCHLORIDE 30 MG: 15 TABLET ORAL at 08:11

## 2022-02-11 RX ADMIN — DONEPEZIL HYDROCHLORIDE 10 MG: 10 TABLET ORAL at 20:01

## 2022-02-11 RX ADMIN — GABAPENTIN 100 MG: 100 CAPSULE ORAL at 16:55

## 2022-02-11 RX ADMIN — SALMETEROL XINAFOATE 1 PUFF: 50 POWDER, METERED ORAL; RESPIRATORY (INHALATION) at 20:01

## 2022-02-11 RX ADMIN — MEMANTINE 10 MG: 10 TABLET ORAL at 08:11

## 2022-02-11 RX ADMIN — GABAPENTIN 100 MG: 100 CAPSULE ORAL at 12:27

## 2022-02-11 ASSESSMENT — ACTIVITIES OF DAILY LIVING (ADL)
HYGIENE/GROOMING: INDEPENDENT;PROMPTS
ORAL_HYGIENE: PROMPTS;INDEPENDENT
LAUNDRY: UNABLE TO COMPLETE
ORAL_HYGIENE: INDEPENDENT;PROMPTS
DRESS: SCRUBS (BEHAVIORAL HEALTH);INDEPENDENT
DRESS: SCRUBS (BEHAVIORAL HEALTH)
LAUNDRY: UNABLE TO COMPLETE
HYGIENE/GROOMING: INDEPENDENT;PROMPTS

## 2022-02-11 NOTE — PLAN OF CARE
Problem: Sleep Disturbance (Psychotic Signs/Symptoms)  Goal: Improved Sleep (Psychotic Signs/Symptoms)  Outcome: No Change     Patient slept a total of 9.25 hours without any concerns raised the whole shift.

## 2022-02-11 NOTE — PLAN OF CARE
Problem: Cognitive Impairment (Psychotic Signs/Symptoms)  Goal: Optimal Cognitive Function (Psychotic Signs/Symptoms)  Outcome: No Change  Intervention: Support and Promote Cognitive Ability  Recent Flowsheet Documentation  Taken 2/11/2022 1000 by Juarez Murphy RN  Trust Relationship/Rapport:    care explained    choices provided    questions encouraged     Problem: Decreased Participation and Engagement (Psychotic Signs/Symptoms)  Goal: Increased Participation and Engagement (Psychotic Signs/Symptoms)  Outcome: No Change     Problem: Mood Impairment (Psychotic Signs/Symptoms)  Goal: Improved Mood Symptoms (Psychotic Signs/Symptoms)  Outcome: No Change     Patient remains isolative and withdrawn. Mood is depressed, affect flat and blunted. Only came out for meals, otherwise spent most of the time in room. Denies SI,SIB, depression/anxiety  and hallucinations. Fair appetite ate 50% of breakfast and lunch. Did not attend groups

## 2022-02-11 NOTE — PROGRESS NOTES
"[facility], [place]   Psychiatric Progress Note        Interim History:   The patient's care was discussed with the treatment team during the daily team meeting and/or staff's chart notes were reviewed.  Staff report patient has been calm. Slept 9.25 hours. Does have a potential guardian.     The patient reports that he is \"all right.\" Mood is \"all right.\" Sleeping well. Says that he hasn't had breakfast yet. Invited him out to the unge and he says, \"I'll be right down.\" Denies SI. Does endorse AH. Understands the voices but denies any commands. More engaged in the interview today.          Medications:       aspirin  81 mg Oral Daily     [Held by provider] atorvastatin  80 mg Oral At Bedtime     busPIRone HCl  30 mg Oral BID     cloZAPine  200 mg Oral At Bedtime     donepezil  10 mg Oral At Bedtime     gabapentin  100 mg Oral TID w/meals     haloperidol  1 mg Oral At Bedtime     levothyroxine  88 mcg Oral Daily     megestrol  20 mg Oral Daily     memantine  10 mg Oral BID     mirtazapine  15 mg Oral At Bedtime     [Held by provider] polyethylene glycol  17 g Oral BID     salmeterol  1 puff Inhalation BID          Allergies:     Allergies   Allergen Reactions     Ibuprofen      Latex           Labs:     No results found for this or any previous visit (from the past 24 hour(s)).       Psychiatric Examination:     /88 (BP Location: Right arm, Patient Position: Supine)   Pulse 97   Temp 98.8  F (37.1  C) (Oral)   Resp 16   Ht 1.778 m (5' 10\")   Wt 74.1 kg (163 lb 6.4 oz)   SpO2 97%   BMI 23.45 kg/m    Weight is 163 lbs 6.4 oz  Body mass index is 23.45 kg/m .  Orthostatic Vitals  Report      Most Recent      Standing Orthostatic /71 01/31 0819    Standing Orthostatic Pulse (bpm) 116 01/31 0819            Appearance: awake, alert and adequately groomed  Attitude:  cooperative  Eye Contact:  good  Mood:  \"all right\"  Affect:  intensity is blunted  Speech:  paucity of " speech, more verbal  Psychomotor Behavior:  no evidence of tardive dyskinesia, dystonia, or tics  Thought Process:  goal oriented  Associations:  no loose associations  Thought Content:  no evidence of suicidal ideation or homicidal ideation, does endorse AH but denies any commands  Insight:  limited  Judgement:  limited  Oriented to:  person only  Attention Span and Concentration:  fair  Recent and Remote Memory:  poor    Clinical Global Impressions  First:  Considering your total clinical experience with this particular patient population, how severe are the patient's symptoms at this time?: 6 (01/27/22 2157)  Compared to the patient's condition at the START of treatment, this patient's condition is: 3 (01/27/22 2157)  Most recent:  Considering your total clinical experience with this particular patient population, how severe are the patient's symptoms at this time?: 6 (01/27/22 2157)  Compared to the patient's condition at the START of treatment, this patient's condition is: 3 (01/27/22 2157)         Precautions:     Behavioral Orders   Procedures     Code 1 - Restrict to Unit     Routine Programming     As clinically indicated     Status 15     Every 15 minutes.          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  He was hospitalized at [facility] for approximately one year and then transferred to [facility] 3B mental health unit for ECT treatment where he was hospitalized 6/30/21 - 1/13/22 until it was discovered that he was COVID-19 positive. He was subsequently transferred to  medicine service until 1/19/22 and then discharged to [place] [address] unit until medically stable. He was readmitted to geriatric unit, station 3B, on 1/26/22. He is currently under commitment with Yanez being amended to include Clozaril.   Patient has been on Zyprexa, Haldol, Risperdal that were not effective. He is confused, disoriented and endorses visual hallucinations, however he denied auditory hallucinations. He is oriented to self only. Patient is unable to take care of himself in the community and the plan will be for the patient to have a guardian moving forward. Inpatient psychiatric hospitalization is warranted at this time for safety, stabilization, and possible adjustment in medications.     Target psychiatric symptoms and interventions:  Continue PTA medications, including:     BuSpar 30 mg 2 times daily  Clozaril 200 mg at bedtime  Aricept 10 mg at bedtime  Gabapentin 100 mg 3 times a day  Haldol 1 mg at bedtime  Namenda 10 mg 2 times a day  Remeron 15 mg at bedtime     Continue hydroxyzine 25 mg q4h prn for acute anxiety  Continue risperidone 1 mg q6h prn for severe agitation    Medicine following elevated LFTs. Statin, APAP and Trazodone held.     Disposition Plan   Reason for ongoing admission: is unable to care for self due to severe psychosis or mariusz  Discharge location: [facility]  Discharge Medications: not ordered  Follow-up Appointments: not scheduled  Legal Status: full commitment    Entered by: Wilton Morfin on February 11, 2022 at 10:29 AM

## 2022-02-11 NOTE — PLAN OF CARE
Problem: Behavior Regulation Impairment (Psychotic Signs/Symptoms)  Goal: Improved Behavioral Control (Psychotic Signs/Symptoms)  Outcome: No Change     Problem: Decreased Participation and Engagement (Psychotic Signs/Symptoms)  Goal: Increased Participation and Engagement (Psychotic Signs/Symptoms)  Outcome: No Change  Intervention: Facilitate Participation and Engagement  Recent Flowsheet Documentation  Taken 2/10/2022 2120 by Jonelle Delatorre RN  Supportive Measures:   active listening utilized   relaxation techniques promoted   self-care encouraged   verbalization of feelings encouraged    Patient remains isolative to his room and withdrawn. He did not come out for supper but instead ate his dinner in his room. His mood is calm with a flat affect. His speech is rambling with on and off eye contact with this writer during the 1;1 conversation. He denied SI/SIB/HIB nor hallucinations. No racing thoughts as claimed. He is sleeping good at night and takes naps during the day. He is med compliant. No behavioral outburst observed.

## 2022-02-11 NOTE — PLAN OF CARE
Assessment/Intervention/Current Symtoms and Care Coordination  The patient's care was discussed with the treatment team and chart notes were reviewed   -[facility] emailed patient's  and attempted to coordinate services.      Discharge Plan or Goal   Currently being formulated due to guardianship difficulties and ongoing stabilization     Barriers to Discharge   Needs continued psychiatric assessment and stabilization.     Referral Status  START program, with the [facility]  Legal Status  Commitment and Jarvised. Court Hold     Contacts:  [facility]:  Ruth Garcia RN, BSN, LALD, [facility]  President at [facility]  Phone: [phone] ([place])  Fax: [phone] ([place])  Mobile: [phone]  Web: [email or web address]  Email:  [email or web address]

## 2022-02-12 LAB
ALBUMIN SERPL-MCNC: 3.2 G/DL (ref 3.4–5)
ALP SERPL-CCNC: 74 U/L (ref 40–150)
ALT SERPL W P-5'-P-CCNC: 163 U/L (ref 0–70)
ANION GAP SERPL CALCULATED.3IONS-SCNC: 5 MMOL/L (ref 3–14)
AST SERPL W P-5'-P-CCNC: 209 U/L (ref 0–45)
BILIRUB SERPL-MCNC: 0.3 MG/DL (ref 0.2–1.3)
BUN SERPL-MCNC: 19 MG/DL (ref 7–30)
CALCIUM SERPL-MCNC: 9.1 MG/DL (ref 8.5–10.1)
CHLORIDE BLD-SCNC: 116 MMOL/L (ref 94–109)
CO2 SERPL-SCNC: 21 MMOL/L (ref 20–32)
CREAT SERPL-MCNC: 0.68 MG/DL (ref 0.66–1.25)
GFR SERPL CREATININE-BSD FRML MDRD: >90 ML/MIN/1.73M2
GLUCOSE BLD-MCNC: 100 MG/DL (ref 70–99)
POTASSIUM BLD-SCNC: 4 MMOL/L (ref 3.4–5.3)
PROT SERPL-MCNC: 7.2 G/DL (ref 6.8–8.8)
SODIUM SERPL-SCNC: 142 MMOL/L (ref 133–144)

## 2022-02-12 PROCEDURE — 36415 COLL VENOUS BLD VENIPUNCTURE: CPT | Performed by: PHYSICIAN ASSISTANT

## 2022-02-12 PROCEDURE — 124N000003 HC R&B MH SENIOR/ADOLESCENT

## 2022-02-12 PROCEDURE — 250N000013 HC RX MED GY IP 250 OP 250 PS 637: Performed by: PSYCHIATRY & NEUROLOGY

## 2022-02-12 PROCEDURE — 87902 NFCT AGT GNTYP ALYS HEP C: CPT | Performed by: PHYSICIAN ASSISTANT

## 2022-02-12 PROCEDURE — 86803 HEPATITIS C AB TEST: CPT | Performed by: PHYSICIAN ASSISTANT

## 2022-02-12 PROCEDURE — 80053 COMPREHEN METABOLIC PANEL: CPT | Performed by: PHYSICIAN ASSISTANT

## 2022-02-12 RX ADMIN — SALMETEROL XINAFOATE 1 PUFF: 50 POWDER, METERED ORAL; RESPIRATORY (INHALATION) at 20:21

## 2022-02-12 RX ADMIN — ASPIRIN 81 MG: 81 TABLET, COATED ORAL at 08:34

## 2022-02-12 RX ADMIN — MEMANTINE 10 MG: 10 TABLET ORAL at 20:20

## 2022-02-12 RX ADMIN — BUSPIRONE HYDROCHLORIDE 30 MG: 15 TABLET ORAL at 08:34

## 2022-02-12 RX ADMIN — SALMETEROL XINAFOATE 1 PUFF: 50 POWDER, METERED ORAL; RESPIRATORY (INHALATION) at 08:34

## 2022-02-12 RX ADMIN — MEGESTROL ACETATE 20 MG: 20 TABLET ORAL at 08:34

## 2022-02-12 RX ADMIN — LEVOTHYROXINE SODIUM 88 MCG: 0.09 TABLET ORAL at 08:34

## 2022-02-12 RX ADMIN — GABAPENTIN 100 MG: 100 CAPSULE ORAL at 12:04

## 2022-02-12 RX ADMIN — CLOZAPINE 200 MG: 100 TABLET ORAL at 20:21

## 2022-02-12 RX ADMIN — DONEPEZIL HYDROCHLORIDE 10 MG: 10 TABLET ORAL at 20:20

## 2022-02-12 RX ADMIN — HALOPERIDOL 1 MG: 1 TABLET ORAL at 20:20

## 2022-02-12 RX ADMIN — MIRTAZAPINE 15 MG: 15 TABLET, FILM COATED ORAL at 20:21

## 2022-02-12 RX ADMIN — BUSPIRONE HYDROCHLORIDE 30 MG: 15 TABLET ORAL at 20:20

## 2022-02-12 RX ADMIN — MEMANTINE 10 MG: 10 TABLET ORAL at 08:35

## 2022-02-12 RX ADMIN — GABAPENTIN 100 MG: 100 CAPSULE ORAL at 08:35

## 2022-02-12 RX ADMIN — GABAPENTIN 100 MG: 100 CAPSULE ORAL at 16:41

## 2022-02-12 ASSESSMENT — ACTIVITIES OF DAILY LIVING (ADL)
DRESS: SCRUBS (BEHAVIORAL HEALTH)
LAUNDRY: UNABLE TO COMPLETE
ORAL_HYGIENE: INDEPENDENT;PROMPTS
DRESS: SCRUBS (BEHAVIORAL HEALTH);INDEPENDENT
HYGIENE/GROOMING: INDEPENDENT;PROMPTS
HYGIENE/GROOMING: INDEPENDENT;PROMPTS
ORAL_HYGIENE: INDEPENDENT;PROMPTS
LAUNDRY: UNABLE TO COMPLETE

## 2022-02-12 NOTE — PLAN OF CARE
"  Problem: Behavior Regulation Impairment (Psychotic Signs/Symptoms)  Goal: Improved Behavioral Control (Psychotic Signs/Symptoms)  Outcome: No Change     Problem: Cognitive Impairment (Psychotic Signs/Symptoms)  Goal: Optimal Cognitive Function (Psychotic Signs/Symptoms)  Outcome: No Change  Intervention: Support and Promote Cognitive Ability  Recent Flowsheet Documentation  Taken 2/12/2022 1000 by Juarez Murphy RN  Trust Relationship/Rapport: care explained     Problem: Decreased Participation and Engagement (Psychotic Signs/Symptoms)  Goal: Increased Participation and Engagement (Psychotic Signs/Symptoms)  Outcome: No Change     Problem: Mood Impairment (Psychotic Signs/Symptoms)  Goal: Improved Mood Symptoms (Psychotic Signs/Symptoms)  Outcome: No Change     Patient is calm, mood is depressed. Affect is flat. Denied SI, SIB. Endorses AH, reported hearing voices of his sister and mother stating \"I talk to them\".  Appearance is untidy, ADLs encouraged but patient refused. Ate 50% of breakfast and 75% of lunch. Did not attend groups or socialize with peers, spent a little time after lunch in the milieu, reported to be responding to auditory hallucinations.  Liver enzymes increased providers order Hepatitis C screen and abdominal ultrasound. Patient to be NPO at midnight for ultrasound in the morning 2/13.  "

## 2022-02-12 NOTE — PLAN OF CARE
"Patient has remained in his room most of the shift. He came to out for dinner only. He is isolative and withdrawn. His affect is flat. His mood is calm. He denies SI and SIB. He endorses AH stating \"I always have them they are telling me my mom loves me\". He endorses depression and anxiety. He is frustrated about being here for so long stating \"when am I gonna get out of here?\". This writer explained they are looking for a guardian for him so he can be safe when he discharges. He stated \"I'm 66 years old I don't need a [place] guardian\". This writer explained he is only 56 years old and he stated \"I'm 66 I was born 1965 do the [employer] math\". Patient is disheveled and untidy but refused to shower when offered. He ate 50% of his dinner with adequate fluid intake. Fluids have been strongly encouraged. He is not social with peers and staff. He refused to go to groups. He is medication compliant.   "

## 2022-02-12 NOTE — PROGRESS NOTES
"Brief Medicine Follow Up Note    Following up regarding LFTs that are stable, not downtrending.     Today's vital signs, medications, and nursing notes were reviewed. Labs reviewed most recent serum and urine laboratories.     /82   Pulse 96   Temp 97  F (36.1  C) (Temporal)   Resp 16   Ht 1.778 m (5' 10\")   Wt 74.1 kg (163 lb 6.4 oz)   SpO2 98%   BMI 23.45 kg/m      A/P:  Hx Hep C reactive, untreated  Transaminitis  Hepatosteatosis or cirrhosis without focal liver lesion  Hep C + 7/2021. Per US abdomen 7/5/21 \"Hepatosteatosis or cirrhosis without focal liver lesion.       A. LI-RADS US Category: US-1 Negative: No US evidence of HCC       B. Recommend continued surveillance US.  2. Patient ended the examination and refused to continue. 3. Splenic and portal veins all antegrade in flow. \"  - Will need follow up with Hepatology for discussion/treatment of hepatitis C  - added on Hep C screen, reflex to HCV RNA  - liver US to follow up on above  - safe intercourse practices, especially given Hep C +  - ok to hold statin, but suspect this can be resumed as LFTs are stable    Medicine will follow peripherally for Hep C, US    Milagros Ray PA-C  [facility]  Contact information available via [facility] Paging/Directory    "

## 2022-02-13 PROCEDURE — 250N000013 HC RX MED GY IP 250 OP 250 PS 637: Performed by: PSYCHIATRY & NEUROLOGY

## 2022-02-13 PROCEDURE — 124N000003 HC R&B MH SENIOR/ADOLESCENT

## 2022-02-13 RX ADMIN — SALMETEROL XINAFOATE 1 PUFF: 50 POWDER, METERED ORAL; RESPIRATORY (INHALATION) at 21:11

## 2022-02-13 RX ADMIN — GABAPENTIN 100 MG: 100 CAPSULE ORAL at 09:19

## 2022-02-13 RX ADMIN — LEVOTHYROXINE SODIUM 88 MCG: 0.09 TABLET ORAL at 09:19

## 2022-02-13 RX ADMIN — BUSPIRONE HYDROCHLORIDE 30 MG: 15 TABLET ORAL at 21:04

## 2022-02-13 RX ADMIN — BUSPIRONE HYDROCHLORIDE 30 MG: 15 TABLET ORAL at 09:19

## 2022-02-13 RX ADMIN — GABAPENTIN 100 MG: 100 CAPSULE ORAL at 13:15

## 2022-02-13 RX ADMIN — CLOZAPINE 200 MG: 100 TABLET ORAL at 21:05

## 2022-02-13 RX ADMIN — MIRTAZAPINE 15 MG: 15 TABLET, FILM COATED ORAL at 21:05

## 2022-02-13 RX ADMIN — MEMANTINE 10 MG: 10 TABLET ORAL at 09:19

## 2022-02-13 RX ADMIN — SALMETEROL XINAFOATE 1 PUFF: 50 POWDER, METERED ORAL; RESPIRATORY (INHALATION) at 09:19

## 2022-02-13 RX ADMIN — MEGESTROL ACETATE 20 MG: 20 TABLET ORAL at 09:19

## 2022-02-13 RX ADMIN — ASPIRIN 81 MG: 81 TABLET, COATED ORAL at 09:19

## 2022-02-13 RX ADMIN — MEMANTINE 10 MG: 10 TABLET ORAL at 21:05

## 2022-02-13 RX ADMIN — HALOPERIDOL 1 MG: 1 TABLET ORAL at 21:05

## 2022-02-13 RX ADMIN — DONEPEZIL HYDROCHLORIDE 10 MG: 10 TABLET ORAL at 21:05

## 2022-02-13 RX ADMIN — GABAPENTIN 100 MG: 100 CAPSULE ORAL at 17:29

## 2022-02-13 RX ADMIN — ALUMINUM HYDROXIDE, MAGNESIUM HYDROXIDE, AND SIMETHICONE 30 ML: 200; 200; 20 SUSPENSION ORAL at 17:29

## 2022-02-13 ASSESSMENT — ACTIVITIES OF DAILY LIVING (ADL)
ORAL_HYGIENE: INDEPENDENT;PROMPTS
DRESS: SCRUBS (BEHAVIORAL HEALTH)
LAUNDRY: UNABLE TO COMPLETE
LAUNDRY: UNABLE TO COMPLETE
ORAL_HYGIENE: INDEPENDENT;PROMPTS
HYGIENE/GROOMING: INDEPENDENT;PROMPTS
HYGIENE/GROOMING: INDEPENDENT;PROMPTS
DRESS: SCRUBS (BEHAVIORAL HEALTH)

## 2022-02-13 NOTE — PLAN OF CARE
"  Problem: Behavior Regulation Impairment (Psychotic Signs/Symptoms)  Goal: Improved Behavioral Control (Psychotic Signs/Symptoms)  Outcome: No Change     Problem: Cognitive Impairment (Psychotic Signs/Symptoms)  Goal: Optimal Cognitive Function (Psychotic Signs/Symptoms)  Outcome: No Change  Intervention: Support and Promote Cognitive Ability  Recent Flowsheet Documentation  Taken 2/13/2022 1000 by Juarez Murphy RN  Trust Relationship/Rapport:   care explained   emotional support provided   thoughts/feelings acknowledged     Problem: Mood Impairment (Psychotic Signs/Symptoms)  Goal: Improved Mood Symptoms (Psychotic Signs/Symptoms)  Outcome: No Change     Problem: Depression  Goal: Improved Mood  Outcome: No Change     Patient is isolative and withdrawn, affect flat and blunted. Mood is calm but became labile at ultra sound refusing to get scanned, Internal medicine is aware and may recheck liver enzymes in a couple days. Patient reports anxiety and depression related to prolonged hospital stay. Denies hallucinations stating \"Not today\" when asked if patient was experiencing and auditory or visual hallucinations. Good appetite, ate 75% of meals. No further concerns.  "

## 2022-02-13 NOTE — PROGRESS NOTES
"Brief Medicine Follow Up Note    Following up regarding LFTs, Hep C and potential cirrhosis     Patient was ordered for portable US, however per radiology team patient did not meet criteria to have a portable study. Patient refused to have US when brought down to radiology suite.  Remains AVSS    Today's vital signs, medications, and nursing notes were reviewed. Labs reviewed most recent serum and urine laboratories.     /71   Pulse 102   Temp 97.2  F (36.2  C) (Temporal)   Resp 16   Ht 1.778 m (5' 10\")   Wt 74.1 kg (163 lb 6.4 oz)   SpO2 97%   BMI 23.45 kg/m        A/P:  Hx Hep C reactive, untreated  Transaminitis  Hepatosteatosis or cirrhosis without focal liver lesion  Hep C + 7/2021. Per US abdomen 7/5/21 \"Hepatosteatosis or cirrhosis without focal liver lesion.       A. LI-RADS US Category: US-1 Negative: No US evidence of HCC       B. Recommend continued surveillance US.  2. Patient ended the examination and refused to continue. 3. Splenic and portal veins all antegrade in flow. \"  - Will need follow up with Hepatology for discussion/treatment of hepatitis C  - Hep C screen, reflex to HCV RNA is pending  - liver US not done d/t patient refusal, portable study if able per radiology, otherwise obtain outpatient with PCP  - safe intercourse practices, especially given Hep C +  - ok to hold statin, but suspect this can be resumed as LFTs are stable     Medicine will follow peripherally for Hep C, see if US is obtained     Milagros Ray PA-C  [facility]  Contact information available via [facility] Paging/Directory  "

## 2022-02-13 NOTE — PLAN OF CARE
"Patient has spent 50% of his time in the lounge playing cribbage with staff and won. He stated he has been playing cribbage since he was 11. His affect is flat. His mood is calm. He denies SI and SIB. He endorses AH stating \"I hear my sister and my mom talking to me\". He endorses anxiety and depression stating \"I always have them\". He was more social this shift. While in the lounge he was smiling and laughing. He denies pain. He ate 75% of his dinner with good fluid intake-see flow sheet. He did agree to take a shower this evening but when writer approached him again he stated \"I'll take one tomorrow morning\". He did not attend groups. He is medication compliant. He voices no concerns. Patient will be NPO after midnight for abdominal U/S in am for elevated liver enzymes.   "

## 2022-02-13 NOTE — PLAN OF CARE
Problem: Sleep Disturbance (Psychotic Signs/Symptoms)  Goal: Improved Sleep (Psychotic Signs/Symptoms)  Outcome: No Change     Slept well for 11 hours  NPO maintained for Abdominal REYMUNDO today, time TBD  No behavioral issues encountered this shift.

## 2022-02-14 LAB — HCV AB SERPL QL IA: REACTIVE

## 2022-02-14 PROCEDURE — 250N000013 HC RX MED GY IP 250 OP 250 PS 637: Performed by: PSYCHIATRY & NEUROLOGY

## 2022-02-14 PROCEDURE — 124N000003 HC R&B MH SENIOR/ADOLESCENT

## 2022-02-14 PROCEDURE — 99233 SBSQ HOSP IP/OBS HIGH 50: CPT | Performed by: PSYCHIATRY & NEUROLOGY

## 2022-02-14 RX ADMIN — CLOZAPINE 200 MG: 100 TABLET ORAL at 21:34

## 2022-02-14 RX ADMIN — GABAPENTIN 100 MG: 100 CAPSULE ORAL at 08:49

## 2022-02-14 RX ADMIN — SALMETEROL XINAFOATE 1 PUFF: 50 POWDER, METERED ORAL; RESPIRATORY (INHALATION) at 19:33

## 2022-02-14 RX ADMIN — LEVOTHYROXINE SODIUM 88 MCG: 0.09 TABLET ORAL at 08:49

## 2022-02-14 RX ADMIN — BUSPIRONE HYDROCHLORIDE 30 MG: 15 TABLET ORAL at 21:34

## 2022-02-14 RX ADMIN — GABAPENTIN 100 MG: 100 CAPSULE ORAL at 18:00

## 2022-02-14 RX ADMIN — MEMANTINE 10 MG: 10 TABLET ORAL at 08:49

## 2022-02-14 RX ADMIN — SALMETEROL XINAFOATE 1 PUFF: 50 POWDER, METERED ORAL; RESPIRATORY (INHALATION) at 08:50

## 2022-02-14 RX ADMIN — ASPIRIN 81 MG: 81 TABLET, COATED ORAL at 08:49

## 2022-02-14 RX ADMIN — GABAPENTIN 100 MG: 100 CAPSULE ORAL at 12:40

## 2022-02-14 RX ADMIN — HALOPERIDOL 1 MG: 1 TABLET ORAL at 21:34

## 2022-02-14 RX ADMIN — DONEPEZIL HYDROCHLORIDE 10 MG: 10 TABLET ORAL at 21:34

## 2022-02-14 RX ADMIN — MEGESTROL ACETATE 20 MG: 20 TABLET ORAL at 08:49

## 2022-02-14 RX ADMIN — MIRTAZAPINE 15 MG: 15 TABLET, FILM COATED ORAL at 21:34

## 2022-02-14 RX ADMIN — MEMANTINE 10 MG: 10 TABLET ORAL at 21:34

## 2022-02-14 RX ADMIN — BUSPIRONE HYDROCHLORIDE 30 MG: 15 TABLET ORAL at 08:49

## 2022-02-14 ASSESSMENT — ACTIVITIES OF DAILY LIVING (ADL)
DRESS: SCRUBS (BEHAVIORAL HEALTH)
ORAL_HYGIENE: INDEPENDENT
HYGIENE/GROOMING: INDEPENDENT

## 2022-02-14 NOTE — PLAN OF CARE
"  Problem: Behavior Regulation Impairment (Psychotic Signs/Symptoms)  Goal: Improved Behavioral Control (Psychotic Signs/Symptoms)  Outcome: Improving     Problem: Cognitive Impairment (Psychotic Signs/Symptoms)  Goal: Optimal Cognitive Function (Psychotic Signs/Symptoms)  Outcome: Improving  Intervention: Support and Promote Cognitive Ability  Recent Flowsheet Documentation  Taken 2/13/2022 1800 by Domingo Celaya RN  Trust Relationship/Rapport:    care explained    emotional support provided     Patient had flat and blunted affect. Patient spent the shift in room resting in bed except coming out for meals. Patient stated his mood is fine but quickly added \"I want cigarette \". Patient was reminded that cigarette are not allowed in the hospital and cannot be given to him. Patient shakes his head side to side when asked if he will need nicotine gum. Patient denied pain/anxiety/depression/covid 19 symptoms/SI/HI/SIB/AH/VH, and contracted for safety. Patient was medication compliant and had good appetite. Will continue to monitor.      "

## 2022-02-14 NOTE — PROGRESS NOTES
"[facility], [place]   Psychiatric Progress Note        Interim History:   The patient's care was discussed with the treatment team during the daily team meeting and/or staff's chart notes were reviewed.  Staff report patient has been responding to internal stimuli this morning. Did play cribbage this weekend. Did get funding for guardian.     The patient reports that he is \"locked up.\" Reports poor sleep but good appetite. Denies SI or HI. Denies AH. Discussed cribbage and patient does report playing this weekend.          Medications:       aspirin  81 mg Oral Daily     [Held by provider] atorvastatin  80 mg Oral At Bedtime     busPIRone HCl  30 mg Oral BID     cloZAPine  200 mg Oral At Bedtime     donepezil  10 mg Oral At Bedtime     gabapentin  100 mg Oral TID w/meals     haloperidol  1 mg Oral At Bedtime     levothyroxine  88 mcg Oral Daily     megestrol  20 mg Oral Daily     memantine  10 mg Oral BID     mirtazapine  15 mg Oral At Bedtime     [Held by provider] polyethylene glycol  17 g Oral BID     salmeterol  1 puff Inhalation BID          Allergies:     Allergies   Allergen Reactions     Ibuprofen      Latex           Labs:     No results found for this or any previous visit (from the past 24 hour(s)).       Psychiatric Examination:     /75 (BP Location: Right arm, Patient Position: Supine)   Pulse 92   Temp 97.6  F (36.4  C) (Temporal)   Resp 16   Ht 1.778 m (5' 10\")   Wt 74.1 kg (163 lb 6.4 oz)   SpO2 99%   BMI 23.45 kg/m    Weight is 163 lbs 6.4 oz  Body mass index is 23.45 kg/m .  Orthostatic Vitals  Report      Most Recent      Standing Orthostatic /71 01/31 0819    Standing Orthostatic Pulse (bpm) 116 01/31 0819            Appearance: awake, alert and adequately groomed  Attitude:  cooperative  Eye Contact:  good  Mood:  \"locked up\"  Affect:  intensity is blunted  Speech:  paucity of speech, more verbal  Psychomotor Behavior:  no evidence of tardive " dyskinesia, dystonia, or tics  Thought Process:  goal oriented  Associations:  no loose associations  Thought Content:  no evidence of suicidal ideation or homicidal ideation and auditory hallucinations present  Insight:  limited  Judgement:  limited  Oriented to:  person only  Attention Span and Concentration:  fair  Recent and Remote Memory:  poor    Clinical Global Impressions  First:  Considering your total clinical experience with this particular patient population, how severe are the patient's symptoms at this time?: 6 (01/27/22 2157)  Compared to the patient's condition at the START of treatment, this patient's condition is: 3 (01/27/22 2157)  Most recent:  Considering your total clinical experience with this particular patient population, how severe are the patient's symptoms at this time?: 6 (01/27/22 2157)  Compared to the patient's condition at the START of treatment, this patient's condition is: 3 (01/27/22 2157)         Precautions:     Behavioral Orders   Procedures     Code 1 - Restrict to Unit     Code 2     For abdominal US     Routine Programming     As clinically indicated     Status 15     Every 15 minutes.          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  He was hospitalized at [facility] for approximately one year and then transferred to [facility]B mental health unit for ECT treatment where he was hospitalized 6/30/21 - 1/13/22 until it was discovered that he was COVID-19 positive. He was subsequently transferred to medicine service until 1/19/22 and then  discharged to [place] [address] unit until medically stable. He was readmitted to geriatric unit, station 3B, on 1/26/22. He is currently under commitment with Yanez being amended to include Clozaril.   Patient has been on Zyprexa, Haldol, Risperdal that were not effective. He is confused, disoriented and endorses visual hallucinations, however he denied auditory hallucinations. He is oriented to self only. Patient is unable to take care of himself in the community and the plan will be for the patient to have a guardian moving forward. Inpatient psychiatric hospitalization is warranted at this time for safety, stabilization, and possible adjustment in medications.     Target psychiatric symptoms and interventions:  Continue PTA medications, including:     BuSpar 30 mg 2 times daily  Clozaril 200 mg at bedtime  Aricept 10 mg at bedtime  Gabapentin 100 mg 3 times a day  Haldol 1 mg at bedtime  Namenda 10 mg 2 times a day  Remeron 15 mg at bedtime     Continue hydroxyzine 25 mg q4h prn for acute anxiety  Continue risperidone 1 mg q6h prn for severe agitation    Medicine following elevated LFTs. Statin, APAP and Trazodone held. Patient refused abdominal U/S.     Disposition Plan   Reason for ongoing admission: is unable to care for self due to severe psychosis or mariusz  Discharge location: jail  Discharge Medications: not ordered  Follow-up Appointments: not scheduled  Legal Status: full commitment    Entered by: Wilton Morfin on February 14, 2022 at 12:26 PM

## 2022-02-14 NOTE — PLAN OF CARE
"  Problem: Behavior Regulation Impairment (Psychotic Signs/Symptoms)  Goal: Improved Behavioral Control (Psychotic Signs/Symptoms)  Outcome: No Change  Pt's affect is flat and his eye contact is inconsistent. Pt lacks insight into his situation. Pt is oriented to himself and a hospital, however he believes he is at [facility].      Pt was approached and asked if he was willing to have an abdominal ultrasound. Pt was informed of his elevated liver enzymes and the need to have the ultrasound to be able to evaluate his liver further. Pt looked directly at writer and declined stated \"Im not having any of that \"f---- A-- S---\".     When writer approached patient in his room he was noted to be hollering. When asked why he was hollering he stated \"That f--- b--- said she won't pick me up today\". Pt was unable to tell writer who \"she\" was.  Pt stated that his commitment is up because it is June. Pt was reminded that it was on February. Pt was accepting of this and took all of his AM medication without any issue. Pt was calm when talking to writer and his eye contact was appropriate.   Pt came out for breakfast and ate 100% of his chocolate chip pancakes , 120 ml of cranberry juice, 120 ml of 1% milk and 240 ml of water with medications.   "

## 2022-02-14 NOTE — PLAN OF CARE
Assessment/Intervention/Current Symtoms and Care Coordination  The patient's care was discussed with the treatment team and chart notes were reviewed   -[facility] emailed patient's  and attempted to coordinate services.      [facility] completed a chart review and discovered that patient has been previously accepted to the [facility] pending guardianship verification. [facility] called melissa and attempted to give her updates regarding patient's guardianship status. FIFI left a vm and requested a call back    Company:[facility] [employer]  Fac[facility]  Melissa  [phone]  Fax: [phone]  Direct to Facility  [phone]    Discharge Plan or Goal   Currently being formulated due to guardianship difficulties and ongoing stabilization     Barriers to Discharge   Needs continued psychiatric assessment and stabilization.     Referral Status  START program, with the [facility]  Legal Status  Commitment and Jarvised. Court Hold     Contacts:  [facility] [place]:  Ruth Garcia RN, BSN, LALD, [facility]  President at [facility]  Phone: [phone] ([place])  Fax: [phone] ([place])  Mobile: [phone]  Web: [email or web address]  Email:  [email or web address]

## 2022-02-14 NOTE — PLAN OF CARE
BEHAVIORAL TEAM DISCUSSION    Participants: Wilton Morfin MD, Una Gaytan,RN, JORDY Payton, [facility],   Progress: adequate  Anticipated length of stay: unknown  Continued Stay Criteria/Rationale: guardianship issues. Placement has not been secured. Unsafe to discharge.  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     Plan: Guardianship proceedings is currently in process. [facility] is working on securing a placement.   Rationale for change in precautions or plan: continue with current plan.

## 2022-02-14 NOTE — PLAN OF CARE
Problem: Sleep Disturbance (Psychotic Signs/Symptoms)  Goal: Improved Sleep (Psychotic Signs/Symptoms)  Outcome: No Change     Slept well for 11.5 hours  No behavioral issues encountered this shift.

## 2022-02-15 PROCEDURE — 250N000013 HC RX MED GY IP 250 OP 250 PS 637: Performed by: PSYCHIATRY & NEUROLOGY

## 2022-02-15 PROCEDURE — 124N000003 HC R&B MH SENIOR/ADOLESCENT

## 2022-02-15 RX ADMIN — SALMETEROL XINAFOATE 1 PUFF: 50 POWDER, METERED ORAL; RESPIRATORY (INHALATION) at 08:47

## 2022-02-15 RX ADMIN — DONEPEZIL HYDROCHLORIDE 10 MG: 10 TABLET ORAL at 19:59

## 2022-02-15 RX ADMIN — MEMANTINE 10 MG: 10 TABLET ORAL at 19:59

## 2022-02-15 RX ADMIN — GABAPENTIN 100 MG: 100 CAPSULE ORAL at 08:48

## 2022-02-15 RX ADMIN — BUSPIRONE HYDROCHLORIDE 30 MG: 15 TABLET ORAL at 19:59

## 2022-02-15 RX ADMIN — BUSPIRONE HYDROCHLORIDE 30 MG: 15 TABLET ORAL at 08:47

## 2022-02-15 RX ADMIN — SALMETEROL XINAFOATE 1 PUFF: 50 POWDER, METERED ORAL; RESPIRATORY (INHALATION) at 19:56

## 2022-02-15 RX ADMIN — CLOZAPINE 200 MG: 100 TABLET ORAL at 19:59

## 2022-02-15 RX ADMIN — MIRTAZAPINE 15 MG: 15 TABLET, FILM COATED ORAL at 19:59

## 2022-02-15 RX ADMIN — MEMANTINE 10 MG: 10 TABLET ORAL at 08:48

## 2022-02-15 RX ADMIN — HALOPERIDOL 1 MG: 1 TABLET ORAL at 19:59

## 2022-02-15 RX ADMIN — MEGESTROL ACETATE 20 MG: 20 TABLET ORAL at 08:48

## 2022-02-15 RX ADMIN — LEVOTHYROXINE SODIUM 88 MCG: 0.09 TABLET ORAL at 08:48

## 2022-02-15 RX ADMIN — ASPIRIN 81 MG: 81 TABLET, COATED ORAL at 08:48

## 2022-02-15 RX ADMIN — GABAPENTIN 100 MG: 100 CAPSULE ORAL at 18:41

## 2022-02-15 RX ADMIN — GABAPENTIN 100 MG: 100 CAPSULE ORAL at 12:52

## 2022-02-15 ASSESSMENT — ACTIVITIES OF DAILY LIVING (ADL)
DRESS: SCRUBS (BEHAVIORAL HEALTH);PROMPTS
LAUNDRY: UNABLE TO COMPLETE
ORAL_HYGIENE: PROMPTS;INDEPENDENT
LAUNDRY: UNABLE TO COMPLETE
DRESS: SCRUBS (BEHAVIORAL HEALTH)
HYGIENE/GROOMING: PROMPTS;INDEPENDENT
HYGIENE/GROOMING: INDEPENDENT;PROMPTS
ORAL_HYGIENE: INDEPENDENT

## 2022-02-15 ASSESSMENT — MIFFLIN-ST. JEOR: SCORE: 1561.98

## 2022-02-15 NOTE — PLAN OF CARE
Problem: Behavior Regulation Impairment (Psychotic Signs/Symptoms)  Goal: Improved Behavioral Control (Psychotic Signs/Symptoms)  Outcome: No Change  Flowsheets (Taken 2/15/2022 1207)  Mutually Determined Action Steps (Improved Behavioral Control): identifies future-oriented goal     Problem: Decreased Participation and Engagement (Psychotic Signs/Symptoms)  Goal: Increased Participation and Engagement (Psychotic Signs/Symptoms)  Outcome: No Change   Pt's goal is to leave the hospital. Pt lacks insight into his situation and would like to know when he is going to be able to leave. Pt is oriented to himself and a hospital but he believes he is at [facility]. Pt's affect is flat with appropriate eye contact. Pt speech is mumbled at times and he is hard to understand.   Pt's hygiene remains poor. Pt declined a shower this shift. Pt did change his scrubs and was able to do jeanie care with directives.   Pt continues to need reminders and encouragement to drink fluids.   Pt came out for lunch and ate 100% of his macaroni and cheese and returned to his room.

## 2022-02-15 NOTE — PLAN OF CARE
Problem: Social, Occupational or Functional Impairment (Psychotic Signs/Symptoms)  Goal: Enhanced Social, Occupational or Functional Skills (Psychotic Signs/Symptoms)  Outcome: No Change     Slept well for 9.5 hours  No behavioral issues encountered this shift.  Independent with repositioning in bed and toileting.

## 2022-02-15 NOTE — PLAN OF CARE
Attended community meeting today but not OT this evening.  Isolates to his room.  Has no new concerns.  P:  continue same plan of care.

## 2022-02-16 LAB
HCV RNA SERPL NAA+PROBE-ACNC: ABNORMAL IU/ML
HCV RNA SERPL NAA+PROBE-LOG IU: 5.9 {LOG_IU}/ML

## 2022-02-16 PROCEDURE — 124N000003 HC R&B MH SENIOR/ADOLESCENT

## 2022-02-16 PROCEDURE — 250N000013 HC RX MED GY IP 250 OP 250 PS 637: Performed by: PSYCHIATRY & NEUROLOGY

## 2022-02-16 PROCEDURE — 99233 SBSQ HOSP IP/OBS HIGH 50: CPT | Performed by: PSYCHIATRY & NEUROLOGY

## 2022-02-16 RX ADMIN — LEVOTHYROXINE SODIUM 88 MCG: 0.09 TABLET ORAL at 08:20

## 2022-02-16 RX ADMIN — BUSPIRONE HYDROCHLORIDE 30 MG: 15 TABLET ORAL at 08:20

## 2022-02-16 RX ADMIN — DONEPEZIL HYDROCHLORIDE 10 MG: 10 TABLET ORAL at 20:00

## 2022-02-16 RX ADMIN — SALMETEROL XINAFOATE 1 PUFF: 50 POWDER, METERED ORAL; RESPIRATORY (INHALATION) at 19:55

## 2022-02-16 RX ADMIN — GABAPENTIN 100 MG: 100 CAPSULE ORAL at 17:44

## 2022-02-16 RX ADMIN — MIRTAZAPINE 15 MG: 15 TABLET, FILM COATED ORAL at 20:00

## 2022-02-16 RX ADMIN — BUSPIRONE HYDROCHLORIDE 30 MG: 15 TABLET ORAL at 20:00

## 2022-02-16 RX ADMIN — MEMANTINE 10 MG: 10 TABLET ORAL at 20:00

## 2022-02-16 RX ADMIN — ASPIRIN 81 MG: 81 TABLET, COATED ORAL at 08:20

## 2022-02-16 RX ADMIN — MEMANTINE 10 MG: 10 TABLET ORAL at 08:20

## 2022-02-16 RX ADMIN — SALMETEROL XINAFOATE 1 PUFF: 50 POWDER, METERED ORAL; RESPIRATORY (INHALATION) at 08:21

## 2022-02-16 RX ADMIN — CLOZAPINE 200 MG: 100 TABLET ORAL at 20:00

## 2022-02-16 RX ADMIN — GABAPENTIN 100 MG: 100 CAPSULE ORAL at 08:21

## 2022-02-16 RX ADMIN — MEGESTROL ACETATE 20 MG: 20 TABLET ORAL at 08:19

## 2022-02-16 RX ADMIN — GABAPENTIN 100 MG: 100 CAPSULE ORAL at 12:18

## 2022-02-16 RX ADMIN — HALOPERIDOL 1 MG: 1 TABLET ORAL at 20:00

## 2022-02-16 ASSESSMENT — ACTIVITIES OF DAILY LIVING (ADL)
LAUNDRY: UNABLE TO COMPLETE
DRESS: SCRUBS (BEHAVIORAL HEALTH)
HYGIENE/GROOMING: PROMPTS;INDEPENDENT
ORAL_HYGIENE: PROMPTS
DRESS: PROMPTS;SCRUBS (BEHAVIORAL HEALTH)
ORAL_HYGIENE: PROMPTS
LAUNDRY: UNABLE TO COMPLETE
HYGIENE/GROOMING: PROMPTS;INDEPENDENT

## 2022-02-16 NOTE — PLAN OF CARE
Remains isolative and withdrawn.  Nothing new or significant to report.   GILMER (acute kidney injury)

## 2022-02-16 NOTE — PLAN OF CARE
Problem: Behavior Regulation Impairment (Psychotic Signs/Symptoms)  Goal: Improved Behavioral Control (Psychotic Signs/Symptoms)  Outcome: Ongoing, Not Progressing     Problem: Cognitive Impairment (Psychotic Signs/Symptoms)  Goal: Optimal Cognitive Function (Psychotic Signs/Symptoms)  Outcome: Ongoing, Not Progressing   Goal Outcome Evaluation:    Plan of Care Reviewed With: patient           Pt continues to be isolative to his room. Pt does come out for meals and snacks. Pt is oriented to self. Pt stated the month as June, year as 2003, date at Thursday the 17th and the hospital as [facility]. Pt was reoriented. Pt asked if his sister could come and pick him up. Pt was reminded that we would have to wait for discharge orders from provider. Pt was accepting of this. Pt denies mental health issues including SI/SIB/depression/anxiety. Pt does identify feeling frustrated with hospitalization.   Pt ate well at breakfast today (75%). Pt also took chocolate Ensure with his AM medications.     Pt came out for a snack around 1430. When patient went to stand he fell slowly to his knees. Pt declined assistance, stood independently and walked quickly to his room as he reported that he needed to use the bathroom. Pt placed on fall precautions.

## 2022-02-16 NOTE — PROGRESS NOTES
"[facility], [place]   Psychiatric Progress Note        Interim History:   The patient's care was discussed with the treatment team during the daily team meeting and/or staff's chart notes were reviewed.  Staff report patient has been eating better. Is being reviewed by Alejandra.     The patient reports that he is \"all right.\" Sleeping and eating well. Denies SI. Denies AH. Hasn't had breakfast yet and asks that it be brought to him.          Medications:       aspirin  81 mg Oral Daily     [Held by provider] atorvastatin  80 mg Oral At Bedtime     busPIRone HCl  30 mg Oral BID     cloZAPine  200 mg Oral At Bedtime     donepezil  10 mg Oral At Bedtime     gabapentin  100 mg Oral TID w/meals     haloperidol  1 mg Oral At Bedtime     levothyroxine  88 mcg Oral Daily     megestrol  20 mg Oral Daily     memantine  10 mg Oral BID     mirtazapine  15 mg Oral At Bedtime     [Held by provider] polyethylene glycol  17 g Oral BID     salmeterol  1 puff Inhalation BID          Allergies:     Allergies   Allergen Reactions     Ibuprofen      Latex           Labs:     No results found for this or any previous visit (from the past 24 hour(s)).       Psychiatric Examination:     /76   Pulse 112   Temp 97.5  F (36.4  C) (Temporal)   Resp 16   Ht 1.778 m (5' 10\")   Wt 73.6 kg (162 lb 3.2 oz)   SpO2 100%   BMI 23.27 kg/m    Weight is 162 lbs 3.2 oz  Body mass index is 23.27 kg/m .  Orthostatic Vitals  Report      Most Recent      Standing Orthostatic /71 01/31 0819    Standing Orthostatic Pulse (bpm) 116 01/31 0819            Appearance: awake, alert and adequately groomed  Attitude:  cooperative  Eye Contact:  good  Mood:  \"all right\"  Affect:  intensity is blunted  Speech:  paucity of speech, more verbal  Psychomotor Behavior:  no evidence of tardive dyskinesia, dystonia, or tics  Thought Process:  goal oriented  Associations:  no loose associations  Thought Content:  no " evidence of suicidal ideation or homicidal ideation and no evidence of psychotic thought  Insight:  limited  Judgement:  limited  Oriented to:  person only  Attention Span and Concentration:  fair  Recent and Remote Memory:  poor    Clinical Global Impressions  First:  Considering your total clinical experience with this particular patient population, how severe are the patient's symptoms at this time?: 6 (01/27/22 2157)  Compared to the patient's condition at the START of treatment, this patient's condition is: 3 (01/27/22 2157)  Most recent:  Considering your total clinical experience with this particular patient population, how severe are the patient's symptoms at this time?: 6 (01/27/22 2157)  Compared to the patient's condition at the START of treatment, this patient's condition is: 3 (01/27/22 2157)         Precautions:     Behavioral Orders   Procedures     Code 1 - Restrict to Unit     Code 2     For abdominal US     Routine Programming     As clinically indicated     Status 15     Every 15 minutes.          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  He was hospitalized at [facility] for approximately one year and then transferred to station 3B mental health unit for ECT treatment where he was hospitalized 6/30/21 - 1/13/22 until it was discovered that he was COVID-19 positive. He was subsequently transferred to medicine service until 1/19/22 and then discharged to [facility] COVID-19 unit until medically stable. He was readmitted to geriatric unit, station 3B, on 1/26/22. He is  currently under commitment with Yanez being amended to include Clozaril.   Patient has been on Zyprexa, Haldol, Risperdal that were not effective. He is confused, disoriented and endorses visual hallucinations, however he denied auditory hallucinations. He is oriented to self only. Patient is unable to take care of himself in the community and the plan will be for the patient to have a guardian moving forward. Inpatient psychiatric hospitalization is warranted at this time for safety, stabilization, and possible adjustment in medications.     Target psychiatric symptoms and interventions:  Continue PTA medications, including:     BuSpar 30 mg 2 times daily  Clozaril 200 mg at bedtime  Aricept 10 mg at bedtime  Gabapentin 100 mg 3 times a day  Haldol 1 mg at bedtime  Namenda 10 mg 2 times a day  Remeron 15 mg at bedtime     Continue hydroxyzine 25 mg q4h prn for acute anxiety  Continue risperidone 1 mg q6h prn for severe agitation    Medicine following elevated LFTs. Statin, APAP and Trazodone held. Patient refused abdominal U/S.     Disposition Plan   Reason for ongoing admission: is unable to care for self due to severe psychosis or mariusz  Discharge location: [facility]  Discharge Medications: not ordered  Follow-up Appointments: not scheduled  Legal Status: full commitment    Entered by: Wilton Morfin on February 16, 2022 at 2:13 PM

## 2022-02-16 NOTE — PLAN OF CARE
Assessment/Intervention/Current Symtoms and Care Coordination  The patient's care was discussed with the treatment team and chart notes were reviewed   -[facility] emailed patient's  and attempted to coordinate services.      [facility] spoke with Marilyn at [facility]. Notified her patient was previously accepted to their facility but was not admitted due to guardianship issues. Marilyn requested new clinicals. [facility] clinicals.   Facility:[facility]  [phone]  Fax: [phone]  Direct to Facility  [phone]     Patient was previously accepted to [facility] but did not get admitted to the facility due to patient having a behavioral outburst on the day of scheduled admission thus, patient remained in the hospital. Kurtis,  emailed [facility] and asked if they would consider the patient again. FIFI and CM are waiting to hear back from [facility].     Discharge Plan or Goal   Currently being formulated due to guardianship difficulties and ongoing stabilization     Barriers to Discharge   Needs continued psychiatric assessment and stabilization.     Referral Status  START program, with the [facility] Assessment referral   [facility]   Legal Status  Commitment and Jarvised. Court Hold     Contacts:  [facility]:  Ruth Garcia, RN, BSN, LAAMBER, [facility]  President at [facility]  Phone: [phone] ([place])  Fax: [phone] ([place])  Mobile: [phone]  Web: [email or web address]  Email:  [email or web address]

## 2022-02-16 NOTE — PLAN OF CARE
Problem: Behavior Regulation Impairment (Psychotic Signs/Symptoms)  Goal: Improved Behavioral Control (Psychotic Signs/Symptoms)  Outcome: Ongoing, Progressing   Goal Outcome Evaluation:      Slept well for 9.25 hours  No behavioral issues encountered this shift

## 2022-02-17 PROCEDURE — 124N000003 HC R&B MH SENIOR/ADOLESCENT

## 2022-02-17 PROCEDURE — 250N000013 HC RX MED GY IP 250 OP 250 PS 637: Performed by: PSYCHIATRY & NEUROLOGY

## 2022-02-17 RX ADMIN — SALMETEROL XINAFOATE 1 PUFF: 50 POWDER, METERED ORAL; RESPIRATORY (INHALATION) at 21:04

## 2022-02-17 RX ADMIN — CLOZAPINE 200 MG: 100 TABLET ORAL at 20:22

## 2022-02-17 RX ADMIN — GABAPENTIN 100 MG: 100 CAPSULE ORAL at 08:48

## 2022-02-17 RX ADMIN — DONEPEZIL HYDROCHLORIDE 10 MG: 10 TABLET ORAL at 20:22

## 2022-02-17 RX ADMIN — ASPIRIN 81 MG: 81 TABLET, COATED ORAL at 08:48

## 2022-02-17 RX ADMIN — SALMETEROL XINAFOATE 1 PUFF: 50 POWDER, METERED ORAL; RESPIRATORY (INHALATION) at 08:48

## 2022-02-17 RX ADMIN — GABAPENTIN 100 MG: 100 CAPSULE ORAL at 17:30

## 2022-02-17 RX ADMIN — HALOPERIDOL 1 MG: 1 TABLET ORAL at 20:22

## 2022-02-17 RX ADMIN — GABAPENTIN 100 MG: 100 CAPSULE ORAL at 12:14

## 2022-02-17 RX ADMIN — MEGESTROL ACETATE 20 MG: 20 TABLET ORAL at 08:48

## 2022-02-17 RX ADMIN — MEMANTINE 10 MG: 10 TABLET ORAL at 20:22

## 2022-02-17 RX ADMIN — BUSPIRONE HYDROCHLORIDE 30 MG: 15 TABLET ORAL at 08:48

## 2022-02-17 RX ADMIN — BUSPIRONE HYDROCHLORIDE 30 MG: 15 TABLET ORAL at 20:22

## 2022-02-17 RX ADMIN — LEVOTHYROXINE SODIUM 88 MCG: 0.09 TABLET ORAL at 08:48

## 2022-02-17 RX ADMIN — MIRTAZAPINE 15 MG: 15 TABLET, FILM COATED ORAL at 20:22

## 2022-02-17 RX ADMIN — MEMANTINE 10 MG: 10 TABLET ORAL at 08:48

## 2022-02-17 ASSESSMENT — ACTIVITIES OF DAILY LIVING (ADL)
DRESS: SCRUBS (BEHAVIORAL HEALTH);INDEPENDENT
HYGIENE/GROOMING: INDEPENDENT;PROMPTS
LAUNDRY: UNABLE TO COMPLETE
ORAL_HYGIENE: PROMPTS;INDEPENDENT

## 2022-02-17 NOTE — CARE PLAN
Assessment/Intervention/Current Symtoms and Care Coordination  The patient's care was discussed with the treatment team and chart notes were reviewed   -[facility] emailed patient's  and attempted to coordinate services.      [facility] sent a new referral to [facility] Fax: [phone] ([place]).     FIFI spoke with Emily at [facility]. She confirmed that patient was previously accepted to their facility. She is willing to review new and updated clinical documents. She stated if they agree patient is appropriate for their facility, they will only accept him if he has a guardian. [facility] notified him that S will be serving the patient as a guardian however, this guardianship has not been finalized by the court yet. [facility] notified her that the process will likely take few weeks. She notified writer she has 1 bed open.       [address] [place] [address]  Office: [phone]  Fax: [phone]  Email: [email or web address]     Discharge Plan or Goal  Currently being formulated due to guardianship difficulties and ongoing stabilization     -[facility] sent a new referral to [facility].   - Communicate with  and requested that he refers patient to [facility] for [place] [employer] assessment.   -Writer email Marilyn at [place] and asked for a referral update. [facility] is waiting to hear back.   Barriers to Discharge   Needs continued psychiatric assessment and stabilization. Guardianship has not been established yet. Safe and secure placement has not been secured.      Referral Status  START program, with the [facility] [employer] Assessment referral   [facility]   Legal Status  Commitment and Jarvised. Court Hold     Contacts:  [facility]:  Ruth Garcia, RN, BSN, LALD, [facility]  President at [facility]  Phone: [phone] ([place])  Fax: [phone] ([place])  Mobile: [phone]  Web:  [email or web address]  Email:  [email or web address]

## 2022-02-17 NOTE — PROGRESS NOTES
"Brief Medicine Note    A/P:  Hx Hep C reactive, untreated  Transaminitis  Hepatosteatosis or cirrhosis without focal liver lesion  Hep C + 7/2021. Per US abdomen 7/5/21 \"Hepatosteatosis or cirrhosis without focal liver lesion.       A. LI-RADS US Category: US-1 Negative: No US evidence of HCC       B. Recommend continued surveillance US.  2. Patient ended the examination and refused to continue. 3. Splenic and portal veins all antegrade in flow. \"    - Follow up with Hepatology outpatient for discussion/treatment of hepatitis C  - liver US not done d/t patient refusal-  obtain outpatient with PCP  - safe intercourse practices, especially given Hep C +  - ok to hold statin, but suspect this can be resumed as LFTs are stable       IM will sign off. Please notify if any intercurrent medical questions or concerns arise.     BP 99/69 (BP Location: Right arm)   Pulse 105   Temp 98.3  F (36.8  C) (Oral)   Resp 20   Ht 1.778 m (5' 10\")   Wt 73.6 kg (162 lb 3.2 oz)   SpO2 98%   BMI 23.27 kg/m        Lenora Cortes PA-C  Hospitalist Service  Pager [phone]      "

## 2022-02-17 NOTE — PLAN OF CARE
Problem: Decreased Participation and Engagement (Psychotic Signs/Symptoms)  Goal: Increased Participation and Engagement (Psychotic Signs/Symptoms)  Outcome: Ongoing, Progressing     Problem: Depression  Goal: Improved Mood  Outcome: Ongoing, Progressing      Problem: Mood Impairment (Psychotic Signs/Symptoms)  Goal: Improved Mood Symptoms (Psychotic Signs/Symptoms)  Outcome: Ongoing, Progressing     Pt has been isolative to his room most of the shift. Came out to meals. Pt med compliant. Pt mumbles when check in, unable to get all the assessment questions answered. Pt denies SI,SIB depression or anxiety. Mood is calm. Flat blunted affect. Pt is med compliant. No other concerns. Will continue to monitor

## 2022-02-17 NOTE — CARE PLAN
Assessment/Intervention/Current Symtoms and Care Coordination  The patient's care was discussed with the treatment team and chart notes were reviewed   -[facility] emailed patient's  and attempted to coordinate services.      [facility] spoke with Marilyn at [facility]. Notified her patient was previously accepted to their facility but was not admitted due to guardianship issues. Marilyn requested new clinicals. [facility] clinicals.   Facility:[facility]  [phone]  Fax: [phone]  Direct to Facility  [phone]     Patient was previously accepted to [facility] but did not get admitted to the facility due to patient having a behavioral outburst on the day of scheduled admission thus, patient remained in the hospital. Kurtis,  emailed [facility] and asked if they would consider the patient again. [facility] and CM are waiting to hear back from [facility].      Discharge Plan or Goal  Currently being formulated due to guardianship difficulties and ongoing stabilization     -[facility] sent a new referral to [facility].   - Communicate with  and requested that he refers patient to [facility] for [place] [employer] assessment.   -Writer email Marilyn at [place] and asked for a referral update. [facility] is waiting to hear back.   Barriers to Discharge   Needs continued psychiatric assessment and stabilization. Guardianship has not been established yet. Safe and secure placement has not been secured.      Referral Status  START program, with the [facility] [email or web address] Assessment referral   [facility]   Legal Status  Commitment and Jarvised. Court Hold     Contacts:  [facility]:  Ruth Garcia, RN, BSN, LALD, [facility]  President at [facility]  Phone: [phone] ([place])  Fax: [phone] ([place])  Mobile: [phone]  Web: [email or web address]  Email:  [email or web address]

## 2022-02-17 NOTE — PLAN OF CARE
Problem: Behavior Regulation Impairment (Psychotic Signs/Symptoms)  Goal: Improved Behavioral Control (Psychotic Signs/Symptoms)  Outcome: Ongoing, Progressing     Slept well for 9.5 hours  Independent in repositioning  No concerns this shift.

## 2022-02-17 NOTE — PROGRESS NOTES
CLINICAL NUTRITION SERVICES - REASSESSMENT NOTE     Nutrition Prescription    RECOMMENDATIONS FOR MDs/PROVIDERS TO ORDER:  Encourage po intake    Malnutrition Status:    Patient does not meet two of the established criteria necessary for diagnosing malnutrition    Recommendations already ordered by Registered Dietitian (RD):  Continue Magic Cup BID    Future/Additional Recommendations:  No additional nutrition follow-up warranted at this time. RD to sign off. Please consult if further needs arise     EVALUATION OF THE PROGRESS TOWARD GOALS   Diet: Mechanical/Dental Soft.  Ensure changed to Magic cup BID 2/4  Intake: per flowsheets, primarily 75% meals. Meal selection has exceeded 3500 kcals and 120 kcals on a regular basis.  Thus, if pt is receiving 75% of meals, pt is getting, on average 125%of estimated needs.       NEW FINDINGS   Weight 2/17 - 74.2  Weight 2/15 - 73.6   Weight 2/06 - 74.1 kg,   Continues to remain up from adm weight 1/26 72.8 kg    MALNUTRITION  % Intake: No decrease noted.   % Weight Loss: Weight loss does not meet criteria - prior weight loss restored.  Wt slowly increased since 1/26  Subcutaneous Fat Loss: Unable to assess  Muscle Loss: Unable to assess  Fluid Accumulation/Edema: Does not meet criteria  Malnutrition Diagnosis: Patient does not meet two of the established criteria necessary for diagnosing malnutrition    Previous Goals   Patient to consume % of nutritionally adequate meal trays TID, or the equivalent with supplements/snacks  Evaluation: Met    Previous Nutrition Diagnosis  Predicted inadequate nutrient intake related to AMS, dentition and weakness post COVID  Evaluation: No longer meets this diagnosis.  Texture of meals and ONS providing adequate nutrition.     CURRENT NUTRITION DIAGNOSIS  No nutrition diagnosis.     INTERVENTIONS  Implementation  Medical food supplement therapy - continue Magic Cup BID    Goals  Patient to consume % of nutritionally adequate meal  trays TID, or the equivalent with supplements/snacks.  Maintain weight > 74 kg    Monitoring/Evaluation  No additional nutrition follow-up warranted at this time. RD to sign off. Please consult if further needs arise    Yola Montague, MPH, RDN, LD, [facility]  Pager: [phone]

## 2022-02-17 NOTE — PLAN OF CARE
Goal Outcome Evaluation: unchang    Plan of Care Reviewed With: patient    No new concerns or complaints.  Appetite good isolates to room.    P:  Continue same plan of care.                                                                                                                                                                                                              Patient refuses activity other than coming out for his dinner.  Isolates to his room. No further concerns.

## 2022-02-18 LAB
BASOPHILS # BLD AUTO: 0.1 10E3/UL (ref 0–0.2)
BASOPHILS NFR BLD AUTO: 2 %
EOSINOPHIL # BLD AUTO: 2.2 10E3/UL (ref 0–0.7)
EOSINOPHIL NFR BLD AUTO: 27 %
ERYTHROCYTE [DISTWIDTH] IN BLOOD BY AUTOMATED COUNT: 15.9 % (ref 10–15)
HCT VFR BLD AUTO: 41.1 % (ref 40–53)
HGB BLD-MCNC: 14.2 G/DL (ref 13.3–17.7)
IMM GRANULOCYTES # BLD: 0 10E3/UL
IMM GRANULOCYTES NFR BLD: 0 %
LYMPHOCYTES # BLD AUTO: 2 10E3/UL (ref 0.8–5.3)
LYMPHOCYTES NFR BLD AUTO: 25 %
MCH RBC QN AUTO: 31.2 PG (ref 26.5–33)
MCHC RBC AUTO-ENTMCNC: 34.5 G/DL (ref 31.5–36.5)
MCV RBC AUTO: 90 FL (ref 78–100)
MONOCYTES # BLD AUTO: 0.7 10E3/UL (ref 0–1.3)
MONOCYTES NFR BLD AUTO: 9 %
NEUTROPHILS # BLD AUTO: 3 10E3/UL (ref 1.6–8.3)
NEUTROPHILS NFR BLD AUTO: 37 %
NRBC # BLD AUTO: 0 10E3/UL
NRBC BLD AUTO-RTO: 0 /100
PLATELET # BLD AUTO: 179 10E3/UL (ref 150–450)
RBC # BLD AUTO: 4.55 10E6/UL (ref 4.4–5.9)
WBC # BLD AUTO: 8 10E3/UL (ref 4–11)

## 2022-02-18 PROCEDURE — 36415 COLL VENOUS BLD VENIPUNCTURE: CPT | Performed by: PSYCHIATRY & NEUROLOGY

## 2022-02-18 PROCEDURE — 99233 SBSQ HOSP IP/OBS HIGH 50: CPT | Performed by: PSYCHIATRY & NEUROLOGY

## 2022-02-18 PROCEDURE — 250N000013 HC RX MED GY IP 250 OP 250 PS 637: Performed by: PSYCHIATRY & NEUROLOGY

## 2022-02-18 PROCEDURE — 124N000003 HC R&B MH SENIOR/ADOLESCENT

## 2022-02-18 PROCEDURE — 85025 COMPLETE CBC W/AUTO DIFF WBC: CPT | Performed by: PSYCHIATRY & NEUROLOGY

## 2022-02-18 RX ADMIN — BUSPIRONE HYDROCHLORIDE 30 MG: 15 TABLET ORAL at 19:55

## 2022-02-18 RX ADMIN — BUSPIRONE HYDROCHLORIDE 30 MG: 15 TABLET ORAL at 08:12

## 2022-02-18 RX ADMIN — SALMETEROL XINAFOATE 1 PUFF: 50 POWDER, METERED ORAL; RESPIRATORY (INHALATION) at 08:12

## 2022-02-18 RX ADMIN — MIRTAZAPINE 15 MG: 15 TABLET, FILM COATED ORAL at 19:55

## 2022-02-18 RX ADMIN — MEMANTINE 10 MG: 10 TABLET ORAL at 19:55

## 2022-02-18 RX ADMIN — MEMANTINE 10 MG: 10 TABLET ORAL at 08:12

## 2022-02-18 RX ADMIN — GABAPENTIN 100 MG: 100 CAPSULE ORAL at 12:30

## 2022-02-18 RX ADMIN — HALOPERIDOL 1 MG: 1 TABLET ORAL at 19:55

## 2022-02-18 RX ADMIN — CLOZAPINE 200 MG: 100 TABLET ORAL at 19:54

## 2022-02-18 RX ADMIN — ASPIRIN 81 MG: 81 TABLET, COATED ORAL at 08:12

## 2022-02-18 RX ADMIN — GABAPENTIN 100 MG: 100 CAPSULE ORAL at 08:12

## 2022-02-18 RX ADMIN — GABAPENTIN 100 MG: 100 CAPSULE ORAL at 17:20

## 2022-02-18 RX ADMIN — LEVOTHYROXINE SODIUM 88 MCG: 0.09 TABLET ORAL at 08:12

## 2022-02-18 RX ADMIN — SALMETEROL XINAFOATE 1 PUFF: 50 POWDER, METERED ORAL; RESPIRATORY (INHALATION) at 19:54

## 2022-02-18 RX ADMIN — MEGESTROL ACETATE 20 MG: 20 TABLET ORAL at 08:12

## 2022-02-18 RX ADMIN — DONEPEZIL HYDROCHLORIDE 10 MG: 10 TABLET ORAL at 19:55

## 2022-02-18 ASSESSMENT — ACTIVITIES OF DAILY LIVING (ADL)
ORAL_HYGIENE: PROMPTS
ORAL_HYGIENE: PROMPTS
HYGIENE/GROOMING: INDEPENDENT
DRESS: SCRUBS (BEHAVIORAL HEALTH);INDEPENDENT
LAUNDRY: UNABLE TO COMPLETE
LAUNDRY: UNABLE TO COMPLETE
DRESS: SCRUBS (BEHAVIORAL HEALTH)
HYGIENE/GROOMING: INDEPENDENT

## 2022-02-18 NOTE — PLAN OF CARE
Problem: Behavior Regulation Impairment (Psychotic Signs/Symptoms)  Goal: Improved Behavioral Control (Psychotic Signs/Symptoms)  Outcome: Ongoing, Progressing   Goal Outcome Evaluation:    Pt slept well for 9.5 hours  No behavioral concerns encountered this shift

## 2022-02-18 NOTE — CARE PLAN
Assessment/Intervention/Current Symtoms and Care Coordination  The patient's care was discussed with the treatment team and chart notes were reviewed   -[facility] emailed patient's  and attempted to coordinate services.      [facility] received a voicemail from Ruth at [facility][note 3] Fax: [phone] ([place]) and she notified writer gissel patient is appropriate for their facility. She requested to interview the patient. Writer emailed her and asked for day and time that works for her.     Patient completed a virtual interview with Kathryn from [employer]. She notified writer that patient would be appropriate for their secure memory care unit. She stated that they filled the last bed yesterday and therefore will be placing the patient on a waiting list.     Discharge Plan or Goal  Currently being formulated due to guardianship difficulties and ongoing stabilization     -[facility] sent a new referral to [facility].   - Communicate with  and requested that he refers patient to [facility] for [place] [employer] assessment.   -Writer email Marilyn at [place] and asked for a referral update. [facility] is waiting to hear back.   Barriers to Discharge   Needs continued psychiatric assessment and stabilization. Guardianship has not been established yet. Safe and secure placement has not been secured.      Referral Status  START program, with the [facility] Assessment referral   [facility] care Management   Legal Status  Commitment and Jarvised. Court Hold     Contacts:  [facility]:  Ruth Garcia, RN, BSN, LALD, [facility]  President at [facility]  Phone: [phone] ([place])  Fax: [phone] ([place])  Mobile: [phone]  Web: [email or web address]  Email:  [email or web address]

## 2022-02-18 NOTE — PLAN OF CARE
"  Problem: Mood Impairment (Psychotic Signs/Symptoms)  Goal: Improved Mood Symptoms (Psychotic Signs/Symptoms)  Outcome: Ongoing, Progressing  Intervention: Optimize Emotion and Mood  Recent Flowsheet Documentation  Taken 2/18/2022 1109 by Jonelle Delatorre RN  Diversional Activity: (refused to come out in milieu) other (see comments)     Problem: Mood Impairment (Psychotic Signs/Symptoms)  Goal: Improved Mood Symptoms (Psychotic Signs/Symptoms)  Intervention: Optimize Emotion and Mood  Recent Flowsheet Documentation  Taken 2/18/2022 1109 by Jonelle Delatorre RN  Diversional Activity: (refused to come out in milieu) other (see comments)     Problem: Decreased Participation and Engagement (Psychotic Signs/Symptoms)  Goal: Increased Participation and Engagement (Psychotic Signs/Symptoms)  Outcome: Ongoing, Not Progressing  Intervention: Facilitate Participation and Engagement  Recent Flowsheet Documentation  Taken 2/18/2022 1109 by Jonelle Delatorre RN  Diversional Activity: (refused to come out in milieu) other (see comments)   Goal Outcome Evaluation:    Plan of Care Reviewed With: patient     Patient remains isolative and withdrawn. He attends groups intermittently. Today, he refused to go top group activities. He preferred to just stay in his room. He is calm with a blunt affect. His speech is pressured; rambling and he mumbles too which it is hard to understand sometimes. He gets irritated when being asked more questions. He endorses auditory hallucinations. Says \"voices in my head\": but did not elaborate. He says that he is \"a little bit depressed\" but did not go into the details. He doesn't have a good eye contact when one talks to him. He ate 75% of both breakfast and lunch. He went to the toilet x 2. He voided freely as claimed. No BM reported.                    "

## 2022-02-18 NOTE — PLAN OF CARE
Goal Outcome Evaluation: ongoing, no change    Plan of Care Reviewed With: patient     Patient is isolative and withdrawn. He did play bingo with peers and came to the dining room for dinner. His affect is flat. His mood is calm. He denies SI and SIB. He endorses AH hearing his mothers voice. He endorses anxiety and depression but does not elaborate. He denies pain. His appetite is fair eating 75% of dinner. His sleep is good. He is untidy and disheveled. He refused to shower. He is not social with peers and staff. He did attend community meeting. He is medication compliant. He voices no concerns.

## 2022-02-18 NOTE — PROGRESS NOTES
[facility], [place]   Psychiatric Progress Note        Interim History:   The patient's care was discussed with the treatment team during the daily team meeting and/or staff's chart notes were reviewed.  Staff report patient has been denying SI or SIB. Slept 9.5 hours. Has been responding to internal stimuli. Has an interview with [facility] today.     The patient reports that he is good. Met with while walking down to breakfast and eating breakfast. More agile and engaged than previous. Reports good sleep and appetite. Denies SI. Denies AH but reports that he has been hearing them to staff.          Medications:       aspirin  81 mg Oral Daily     [Held by provider] atorvastatin  80 mg Oral At Bedtime     busPIRone HCl  30 mg Oral BID     cloZAPine  200 mg Oral At Bedtime     donepezil  10 mg Oral At Bedtime     gabapentin  100 mg Oral TID w/meals     haloperidol  1 mg Oral At Bedtime     levothyroxine  88 mcg Oral Daily     megestrol  20 mg Oral Daily     memantine  10 mg Oral BID     mirtazapine  15 mg Oral At Bedtime     [Held by provider] polyethylene glycol  17 g Oral BID     salmeterol  1 puff Inhalation BID          Allergies:     Allergies   Allergen Reactions     Ibuprofen      Latex           Labs:     Recent Results (from the past 24 hour(s))   CBC with platelets and differential    Collection Time: 02/18/22  6:19 AM   Result Value Ref Range    WBC Count 8.0 4.0 - 11.0 10e3/uL    RBC Count 4.55 4.40 - 5.90 10e6/uL    Hemoglobin 14.2 13.3 - 17.7 g/dL    Hematocrit 41.1 40.0 - 53.0 %    MCV 90 78 - 100 fL    MCH 31.2 26.5 - 33.0 pg    MCHC 34.5 31.5 - 36.5 g/dL    RDW 15.9 (H) 10.0 - 15.0 %    Platelet Count 179 150 - 450 10e3/uL    % Neutrophils 37 %    % Lymphocytes 25 %    % Monocytes 9 %    % Eosinophils 27 %    % Basophils 2 %    % Immature Granulocytes 0 %    NRBCs per 100 WBC 0 <1 /100    Absolute Neutrophils 3.0 1.6 - 8.3 10e3/uL    Absolute Lymphocytes 2.0 0.8  "- 5.3 10e3/uL    Absolute Monocytes 0.7 0.0 - 1.3 10e3/uL    Absolute Eosinophils 2.2 (H) 0.0 - 0.7 10e3/uL    Absolute Basophils 0.1 0.0 - 0.2 10e3/uL    Absolute Immature Granulocytes 0.0 <=0.4 10e3/uL    Absolute NRBCs 0.0 10e3/uL          Psychiatric Examination:     BP (!) 126/92 (Patient Position: Sitting)   Pulse 88   Temp 98.1  F (36.7  C) (Oral)   Resp 16   Ht 1.778 m (5' 10\")   Wt 74.2 kg (163 lb 8 oz)   SpO2 97%   BMI 23.46 kg/m    Weight is 163 lbs 8 oz  Body mass index is 23.46 kg/m .  Orthostatic Vitals  Report      Most Recent      Standing Orthostatic /71 01/31 0819    Standing Orthostatic Pulse (bpm) 116 01/31 0819            Appearance: awake, alert and adequately groomed  Attitude:  cooperative  Eye Contact:  good  Mood:  good  Affect:  intensity is blunted  Speech:  paucity of speech, more verbal at times  Psychomotor Behavior:  no evidence of tardive dyskinesia, dystonia, or tics  Thought Process:  goal oriented  Associations:  no loose associations  Thought Content:  no evidence of suicidal ideation or homicidal ideation and auditory hallucinations present  Insight:  limited  Judgement:  limited  Oriented to:  person only  Attention Span and Concentration:  fair  Recent and Remote Memory:  poor    Clinical Global Impressions  First:  Considering your total clinical experience with this particular patient population, how severe are the patient's symptoms at this time?: 6 (01/27/22 2157)  Compared to the patient's condition at the START of treatment, this patient's condition is: 3 (01/27/22 2157)  Most recent:  Considering your total clinical experience with this particular patient population, how severe are the patient's symptoms at this time?: 6 (01/27/22 2157)  Compared to the patient's condition at the START of treatment, this patient's condition is: 3 (01/27/22 2157)         Precautions:     Behavioral Orders   Procedures     Code 1 - Restrict to Unit     Code 2     For " abdominal US     Fall precautions     Routine Programming     As clinically indicated     Status 15     Every 15 minutes.          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  He was hospitalized at [facility] for approximately one year and then transferred to station 3B mental health unit for ECT treatment where he was hospitalized 6/30/21 - 1/13/22 until it was discovered that he was COVID-19 positive. He was subsequently transferred to medicine service until 1/19/22 and then discharged to [facility] COVID19 unit until medically stable. He was readmitted to geriatric unit, station 3B, on 1/26/22. He is currently under commitment with Yanez being amended to include Clozaril.   Patient has been on Zyprexa, Haldol, Risperdal that were not effective. He is confused, disoriented and endorses visual hallucinations, however he denied auditory hallucinations. He is oriented to self only. Patient is unable to take care of himself in the community and the plan will be for the patient to have a guardian moving forward. Inpatient psychiatric hospitalization is warranted at this time for safety, stabilization, and possible adjustment in medications.     Target psychiatric symptoms and interventions:  Continue PTA medications, including:     BuSpar 30 mg 2 times daily  Clozaril 200 mg at bedtime  Aricept 10 mg at bedtime  Gabapentin 100 mg 3 times a day  Haldol 1 mg at bedtime  Namenda 10 mg 2 times a day  Remeron 15 mg at bedtime     Continue hydroxyzine 25 mg q4h prn for acute  anxiety  Continue risperidone 1 mg q6h prn for severe agitation    Medicine following elevated LFTs. Statin, APAP and Trazodone held.     Disposition Plan   Reason for ongoing admission: is unable to care for self due to severe psychosis or mariusz  Discharge location: [facility]  Discharge Medications: not ordered  Follow-up Appointments: not scheduled  Legal Status: full commitment    Entered by: Wilton Morfin on February 18, 2022 at 3:05 PM

## 2022-02-18 NOTE — PLAN OF CARE
Goal Outcome Evaluation:unchanged    Plan of Care Reviewed With: patient     Patient remains withdrawn but attends group when encouraged and is visible in the lounge for short periods of time, mostly to have his meals or snacks.  Denies hearing voices and depression, SI, or the wish to be dead.  Endorses anxiety but does not elaborate on it.  Untidy and disheveled but declines taking a shower.  Ate about 50% of his dinner tonight but took a snack.  P:  Continue same plan of care.

## 2022-02-19 PROCEDURE — 250N000013 HC RX MED GY IP 250 OP 250 PS 637: Performed by: PSYCHIATRY & NEUROLOGY

## 2022-02-19 PROCEDURE — 124N000003 HC R&B MH SENIOR/ADOLESCENT

## 2022-02-19 RX ADMIN — CLOZAPINE 200 MG: 100 TABLET ORAL at 21:27

## 2022-02-19 RX ADMIN — MIRTAZAPINE 15 MG: 15 TABLET, FILM COATED ORAL at 21:27

## 2022-02-19 RX ADMIN — GABAPENTIN 100 MG: 100 CAPSULE ORAL at 09:09

## 2022-02-19 RX ADMIN — ASPIRIN 81 MG: 81 TABLET, COATED ORAL at 09:09

## 2022-02-19 RX ADMIN — SALMETEROL XINAFOATE 1 PUFF: 50 POWDER, METERED ORAL; RESPIRATORY (INHALATION) at 09:09

## 2022-02-19 RX ADMIN — BUSPIRONE HYDROCHLORIDE 30 MG: 15 TABLET ORAL at 09:09

## 2022-02-19 RX ADMIN — HALOPERIDOL 1 MG: 1 TABLET ORAL at 21:27

## 2022-02-19 RX ADMIN — MEMANTINE 10 MG: 10 TABLET ORAL at 21:27

## 2022-02-19 RX ADMIN — GABAPENTIN 100 MG: 100 CAPSULE ORAL at 17:51

## 2022-02-19 RX ADMIN — DONEPEZIL HYDROCHLORIDE 10 MG: 10 TABLET ORAL at 21:29

## 2022-02-19 RX ADMIN — MEGESTROL ACETATE 20 MG: 20 TABLET ORAL at 09:09

## 2022-02-19 RX ADMIN — GABAPENTIN 100 MG: 100 CAPSULE ORAL at 13:03

## 2022-02-19 RX ADMIN — LEVOTHYROXINE SODIUM 88 MCG: 0.09 TABLET ORAL at 09:09

## 2022-02-19 RX ADMIN — SALMETEROL XINAFOATE 1 PUFF: 50 POWDER, METERED ORAL; RESPIRATORY (INHALATION) at 21:28

## 2022-02-19 RX ADMIN — BUSPIRONE HYDROCHLORIDE 30 MG: 15 TABLET ORAL at 21:27

## 2022-02-19 RX ADMIN — MEMANTINE 10 MG: 10 TABLET ORAL at 09:09

## 2022-02-19 ASSESSMENT — ACTIVITIES OF DAILY LIVING (ADL)
DRESS: SCRUBS (BEHAVIORAL HEALTH)
HYGIENE/GROOMING: INDEPENDENT
HYGIENE/GROOMING: INDEPENDENT

## 2022-02-19 NOTE — PLAN OF CARE
Problem: Behavior Regulation Impairment (Psychotic Signs/Symptoms)  Goal: Improved Behavioral Control (Psychotic Signs/Symptoms)  Outcome: Ongoing, Progressing   Goal Outcome Evaluation:    Slept well for 9.5 hours  Independent in repositioning and toileting  No concerns raised and no behavioral concerns encountered.

## 2022-02-19 NOTE — PLAN OF CARE
Problem: Behavioral Health Plan of Care  Goal: Optimized Coping Skills in Response to Life Stressors  Outcome: Ongoing, Progressing     Problem: Behavioral Health Plan of Care  Goal: Adheres to Safety Considerations for Self and Others  Intervention: Develop and Maintain Individualized Safety Plan  Recent Flowsheet Documentation  Taken 2/19/2022 1000 by Domingo Celaya RN  Safety Measures:    environmental rounds completed    safety plan mutually developed    safety plan reviewed    safety rounds completed     Problem: Behavior Regulation Impairment (Psychotic Signs/Symptoms)  Goal: Improved Behavioral Control (Psychotic Signs/Symptoms)  Outcome: Ongoing, Progressing   Goal Outcome Evaluation:    Plan of Care Reviewed With: patient      Patient had flat affect. Patient could barely talk during assessment. Patient spent the shift in room sleeping except coming out for meals. Patient appeared depressed. Patient stated his mood fine but declined to come out and socialize with peers. Patient rated anxiety 5/10, depression 5/10. Patient verbalized hearing voices but could state what the voices are telling him. Patient denied covid 19 symptoms/SI/HI/SIB/VH, and contracted for safety. Was medication compliant and had good appetite. Will continue to monitor.

## 2022-02-20 PROCEDURE — 250N000013 HC RX MED GY IP 250 OP 250 PS 637: Performed by: PSYCHIATRY & NEUROLOGY

## 2022-02-20 PROCEDURE — 124N000003 HC R&B MH SENIOR/ADOLESCENT

## 2022-02-20 RX ADMIN — ASPIRIN 81 MG: 81 TABLET, COATED ORAL at 09:14

## 2022-02-20 RX ADMIN — GABAPENTIN 100 MG: 100 CAPSULE ORAL at 09:14

## 2022-02-20 RX ADMIN — GABAPENTIN 100 MG: 100 CAPSULE ORAL at 17:41

## 2022-02-20 RX ADMIN — DONEPEZIL HYDROCHLORIDE 10 MG: 10 TABLET ORAL at 21:14

## 2022-02-20 RX ADMIN — MIRTAZAPINE 15 MG: 15 TABLET, FILM COATED ORAL at 21:14

## 2022-02-20 RX ADMIN — BUSPIRONE HYDROCHLORIDE 30 MG: 15 TABLET ORAL at 21:13

## 2022-02-20 RX ADMIN — BUSPIRONE HYDROCHLORIDE 30 MG: 15 TABLET ORAL at 09:14

## 2022-02-20 RX ADMIN — SALMETEROL XINAFOATE 1 PUFF: 50 POWDER, METERED ORAL; RESPIRATORY (INHALATION) at 21:12

## 2022-02-20 RX ADMIN — SALMETEROL XINAFOATE 1 PUFF: 50 POWDER, METERED ORAL; RESPIRATORY (INHALATION) at 09:14

## 2022-02-20 RX ADMIN — MEGESTROL ACETATE 20 MG: 20 TABLET ORAL at 09:14

## 2022-02-20 RX ADMIN — MEMANTINE 10 MG: 10 TABLET ORAL at 21:14

## 2022-02-20 RX ADMIN — HALOPERIDOL 1 MG: 1 TABLET ORAL at 21:14

## 2022-02-20 RX ADMIN — GABAPENTIN 100 MG: 100 CAPSULE ORAL at 11:42

## 2022-02-20 RX ADMIN — MEMANTINE 10 MG: 10 TABLET ORAL at 09:14

## 2022-02-20 RX ADMIN — LEVOTHYROXINE SODIUM 88 MCG: 0.09 TABLET ORAL at 09:14

## 2022-02-20 RX ADMIN — CLOZAPINE 200 MG: 100 TABLET ORAL at 21:13

## 2022-02-20 ASSESSMENT — ACTIVITIES OF DAILY LIVING (ADL)
DRESS: SCRUBS (BEHAVIORAL HEALTH)
DRESS: SCRUBS (BEHAVIORAL HEALTH)
HYGIENE/GROOMING: INDEPENDENT
ORAL_HYGIENE: PROMPTS
ORAL_HYGIENE: PROMPTS
HYGIENE/GROOMING: INDEPENDENT

## 2022-02-20 NOTE — PLAN OF CARE
Problem: Behavior Regulation Impairment (Psychotic Signs/Symptoms)  Goal: Improved Behavioral Control (Psychotic Signs/Symptoms)  Outcome: Ongoing, Progressing   Goal Outcome Evaluation:      Slept well for 9 hours  No behavioral concerns encountered this shift.

## 2022-02-20 NOTE — PLAN OF CARE
"Goal Outcome Evaluation: Unsure if patient is progressing as he is still very depressed    Plan of Care Reviewed With: patient   Problem: Depression  Goal: Improved Mood  Outcome: Ongoing, Not Progressing    Patient slept 9 hrs last night per NOC report. Patient presents as flat blunted affect, depressed mood, withdrawn and isolative to his room. Patient only came out for meals (breakfast and lunch) and went right back to bed. During assessment patient rambled words which sounded more like \"I just want to get the hell out of hear or get the hell out of hear.\" Patient would not answer any further questions from this RN and covered up his head with a flat bed sheet. His vital signs were WDL and  he is compliant with medications.  Patient did not attend unit groups this shift. He slept for about 2 hrs and awake in bed the rest of the time.     APPETITE: Fair  SLEEP: Sleeping a lot   PRNs: None this shift.                       "

## 2022-02-20 NOTE — PLAN OF CARE
"Goal Outcome Evaluation: unchanged    Plan of Care Reviewed With: patient     Patient yelling in his room.  When asked who he was yelling or for, patient replied \"my sister\"  no further outbursts.  Denies depression, SI or the wish to be dead.  Denies hearing voices.admits to anxiety but is unable to rate.  Ate approx 40% of his meal tonight.  Isolates to his room but did come to the community room to play bingo.  P:  conitinue same plan of care.                  "

## 2022-02-21 PROCEDURE — 124N000003 HC R&B MH SENIOR/ADOLESCENT

## 2022-02-21 PROCEDURE — 250N000013 HC RX MED GY IP 250 OP 250 PS 637: Performed by: PSYCHIATRY & NEUROLOGY

## 2022-02-21 PROCEDURE — 99233 SBSQ HOSP IP/OBS HIGH 50: CPT | Performed by: PSYCHIATRY & NEUROLOGY

## 2022-02-21 RX ADMIN — HALOPERIDOL 1 MG: 1 TABLET ORAL at 20:34

## 2022-02-21 RX ADMIN — GABAPENTIN 100 MG: 100 CAPSULE ORAL at 12:59

## 2022-02-21 RX ADMIN — GABAPENTIN 100 MG: 100 CAPSULE ORAL at 17:28

## 2022-02-21 RX ADMIN — GABAPENTIN 100 MG: 100 CAPSULE ORAL at 09:58

## 2022-02-21 RX ADMIN — BUSPIRONE HYDROCHLORIDE 30 MG: 15 TABLET ORAL at 09:58

## 2022-02-21 RX ADMIN — BUSPIRONE HYDROCHLORIDE 30 MG: 15 TABLET ORAL at 20:33

## 2022-02-21 RX ADMIN — MEMANTINE 10 MG: 10 TABLET ORAL at 20:33

## 2022-02-21 RX ADMIN — DONEPEZIL HYDROCHLORIDE 10 MG: 10 TABLET ORAL at 20:33

## 2022-02-21 RX ADMIN — MIRTAZAPINE 15 MG: 15 TABLET, FILM COATED ORAL at 20:33

## 2022-02-21 RX ADMIN — CLOZAPINE 200 MG: 100 TABLET ORAL at 20:33

## 2022-02-21 RX ADMIN — MEMANTINE 10 MG: 10 TABLET ORAL at 09:59

## 2022-02-21 RX ADMIN — SALMETEROL XINAFOATE 1 PUFF: 50 POWDER, METERED ORAL; RESPIRATORY (INHALATION) at 10:00

## 2022-02-21 RX ADMIN — LEVOTHYROXINE SODIUM 88 MCG: 0.09 TABLET ORAL at 09:58

## 2022-02-21 RX ADMIN — ASPIRIN 81 MG: 81 TABLET, COATED ORAL at 09:58

## 2022-02-21 RX ADMIN — MEGESTROL ACETATE 20 MG: 20 TABLET ORAL at 09:59

## 2022-02-21 RX ADMIN — SALMETEROL XINAFOATE 1 PUFF: 50 POWDER, METERED ORAL; RESPIRATORY (INHALATION) at 20:29

## 2022-02-21 ASSESSMENT — ACTIVITIES OF DAILY LIVING (ADL)
HYGIENE/GROOMING: INDEPENDENT;PROMPTS
ORAL_HYGIENE: PROMPTS
LAUNDRY: UNABLE TO COMPLETE
DRESS: INDEPENDENT
HYGIENE/GROOMING: INDEPENDENT
DRESS: SCRUBS (BEHAVIORAL HEALTH);INDEPENDENT
ORAL_HYGIENE: INDEPENDENT;PROMPTS
LAUNDRY: UNABLE TO COMPLETE

## 2022-02-21 NOTE — PLAN OF CARE
02/21/22 1536   Individualization/Patient Specific Goals   Patient Personal Strengths resourceful;resilient   Patient Vulnerabilities substance abuse/addiction;limited support system;limited social skills;traumatic event;history of unsuccessful treatment;poor physical health;lacks insight into illness;poor impulse control   Interprofessional Rounds   Summary Patient continues to isolate in room, but is in milieu at times. Referrals to several assisted living and memory care facilities have been made.   Participants nursing;psychiatrist;social work   Team Discussion   Participants Wilton Humphrey MD; Paola العراقي RN; Lidia Layne DAVINA [facility]   Progress Minimal changes   Anticipated length of stay 2-4 weeks.   Continued Stay Criteria/Rationale Requires door to door discharge, discharge facility not established.   Medical/Physical Stroke history, wieght loss.   Precautions Behavioral Orders   Procedures    Code 1 - Restrict to Unit    Code 2     For abdominal US    Fall precautions    Routine Programming     As clinically indicated    Status 15     Every 15 minutes.       Plan CTC to continue with Assisted living and memory care referrals.   Rationale for change in precautions or plan No changes.   Anticipated Discharge Disposition assisted living;basic nursing care/long-term care;nursing/skilled nursing care

## 2022-02-21 NOTE — PLAN OF CARE
Goal Outcome Evaluation: no change    Plan of Care Reviewed With: patient     Patient remains withdrawn and isolative, but comes out for meals and goes to groups occasionally with encouragement.  Ate 100% of dinner tonight.  Isolative and withdrawn.  No new concerns.

## 2022-02-21 NOTE — PLAN OF CARE
"Assessment/Intervention/Current Symtoms and Care Coordination:  Upcoming appointments:  Teams interview with Ruth at [facility] 22at 11:30 AM  Chart Review  Met with team to discuss patient care.  Checked in with patient, informed him of interview at [facility]. He asked why [place] couldn't [place] [facility] informed him that he cannot due to his commitment. He stated the commitment was  and that he was going to [facility]. CTC responded \"OK\" and left his room to avoid patient escalation.    Discharge Plan or Goal:  Pending stabilization & development of a safe discharge plan.  Discharge to SNF or Memory Care.    Barriers to Discharge:  Patient requires further psychiatric stabilization due to current symptomology, Medication management with possible adjustments and guardianship    Referral Status:  Interview at [facility] scheduled for 22 via teams.    Legal Status:  Patient is under MI commitment in [facility] with Beau. [id number]     Contacts:  Case Management Services:  [facility]   Kurtis Chapin. Phone: [phone]  Supervisor is [email or web address]     Freida Talley  [phone]    [facility]:  Ruth Garcia, RN, BSN, [place], [facility]  President at [facility]  Phone: [phone] ([place])  Fax: [phone] ([place])  Mobile: [phone]  Web: [email or web address]  Email:  [email or web address]      "

## 2022-02-21 NOTE — CARE PLAN
02/21/22 1536   Team Discussion   Participants Wilton Humphrey MD; Paola العراقي, RN; YAHAIRA Alvarado [facility]   Progress Minimal changes   Anticipated length of stay 2-4 weeks.   Continued Stay Criteria/Rationale Requires door to door discharge, discharge facility not established.   Medical/Physical Stroke history, wieght loss.   Precautions  --    Plan CTC to continue with Assisted living and memory care referrals.   Rationale for change in precautions or plan No changes.

## 2022-02-21 NOTE — PROGRESS NOTES
"[facility], [place]   Psychiatric Progress Note        Interim History:   The patient's care was discussed with the treatment team during the daily team meeting and/or staff's chart notes were reviewed.  Staff report patient has been isolative and withdrawn. Eating well. Medication compliant.     The patient reports that he is \"all right.\" Is tired today and reports poor sleep. Is eating well. Denies current SI. Denies current AH. Discussed that we are getting closer to discharge and patient says \"all right.\"          Medications:       aspirin  81 mg Oral Daily     [Held by provider] atorvastatin  80 mg Oral At Bedtime     busPIRone HCl  30 mg Oral BID     cloZAPine  200 mg Oral At Bedtime     donepezil  10 mg Oral At Bedtime     gabapentin  100 mg Oral TID w/meals     haloperidol  1 mg Oral At Bedtime     levothyroxine  88 mcg Oral Daily     megestrol  20 mg Oral Daily     memantine  10 mg Oral BID     mirtazapine  15 mg Oral At Bedtime     [Held by provider] polyethylene glycol  17 g Oral BID     salmeterol  1 puff Inhalation BID          Allergies:     Allergies   Allergen Reactions     Ibuprofen      Latex           Labs:     No results found for this or any previous visit (from the past 24 hour(s)).       Psychiatric Examination:     /76 (BP Location: Right arm, Patient Position: Supine, Cuff Size: Adult Regular)   Pulse 95   Temp 97.6  F (36.4  C) (Temporal)   Resp 16   Ht 1.778 m (5' 10\")   Wt 74.3 kg (163 lb 14.4 oz)   SpO2 96%   BMI 23.52 kg/m    Weight is 163 lbs 14.4 oz  Body mass index is 23.52 kg/m .  Orthostatic Vitals  Report      Most Recent      Standing Orthostatic /71 01/31 0819    Standing Orthostatic Pulse (bpm) 116 01/31 0819            Appearance: adequately groomed and fatigued  Attitude:  cooperative  Eye Contact:  good  Mood:  \"all right\"  Affect:  intensity is blunted  Speech:  paucity of speech, more verbal at times  Psychomotor Behavior:  " no evidence of tardive dyskinesia, dystonia, or tics  Thought Process:  goal oriented  Associations:  no loose associations  Thought Content:  no evidence of suicidal ideation or homicidal ideation and no evidence of psychotic thought  Insight:  limited  Judgement:  limited  Oriented to:  person only  Attention Span and Concentration:  fair  Recent and Remote Memory:  poor    Clinical Global Impressions  First:  Considering your total clinical experience with this particular patient population, how severe are the patient's symptoms at this time?: 6 (01/27/22 2157)  Compared to the patient's condition at the START of treatment, this patient's condition is: 3 (01/27/22 2157)  Most recent:  Considering your total clinical experience with this particular patient population, how severe are the patient's symptoms at this time?: 6 (01/27/22 2157)  Compared to the patient's condition at the START of treatment, this patient's condition is: 3 (01/27/22 2157)         Precautions:     Behavioral Orders   Procedures     Code 1 - Restrict to Unit     Code 2     For abdominal US     Fall precautions     Routine Programming     As clinically indicated     Status 15     Every 15 minutes.          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  He was hospitalized at [facility] for approximately one year and then transferred to [facility] 3B mental health unit for ECT treatment where he was hospitalized 6/30/21 - 1/13/22 until it was discovered that he was COVID-19 positive. He was subsequently transferred to  medicine service until 1/19/22 and then discharged to [place] [address] unit until medically stable. He was readmitted to geriatric unit, station 3B, on 1/26/22. He is currently under commitment with Yanez being amended to include Clozaril.   Patient has been on Zyprexa, Haldol, Risperdal that were not effective. He is confused, disoriented and endorses visual hallucinations, however he denied auditory hallucinations. He is oriented to self only. Patient is unable to take care of himself in the community and the plan will be for the patient to have a guardian moving forward. Inpatient psychiatric hospitalization is warranted at this time for safety, stabilization, and possible adjustment in medications.     Target psychiatric symptoms and interventions:  Continue PTA medications, including:     BuSpar 30 mg 2 times daily  Clozaril 200 mg at bedtime  Aricept 10 mg at bedtime  Gabapentin 100 mg 3 times a day  Haldol 1 mg at bedtime  Namenda 10 mg 2 times a day  Remeron 15 mg at bedtime     Continue hydroxyzine 25 mg q4h prn for acute anxiety  Continue risperidone 1 mg q6h prn for severe agitation    Medicine following elevated LFTs. Statin, APAP and Trazodone held.     Guardianship in process. Admission to group home. pending finalization of guardianship.     Disposition Plan   Reason for ongoing admission: is unable to care for self due to severe psychosis or mariusz  Discharge location: [facility]  Discharge Medications: not ordered  Follow-up Appointments: not scheduled  Legal Status: full commitment    Entered by: Wilton Morfin on February 21, 2022 at 10:14 AM

## 2022-02-21 NOTE — PLAN OF CARE
Problem: Sleep Disturbance (Psychotic Signs/Symptoms)  Goal: Improved Sleep (Psychotic Signs/Symptoms)  Outcome: Met   Goal Outcome Evaluation    Pt has been sleeping well for the past few nights  Slept uninterrupted for 9 hours  No behavioral issues encountered this shift.

## 2022-02-21 NOTE — PLAN OF CARE
Goal Outcome Evaluation:    Plan of Care Reviewed With: patient      Patient mostly withdrawn and isolated to his room mostly during the shift. Presented with blunted flat affect and depressed mood. Denies all other mental symptoms. Rated depression at 4/10, declined PRN but compliant with scheduled medication. Appetite is good, eats over 70% of his meals and fluids intake is adequate at this time. Denies pain and discomfort.   Will continue with current plan of care and status 15 checks.

## 2022-02-22 PROCEDURE — 250N000013 HC RX MED GY IP 250 OP 250 PS 637: Performed by: PSYCHIATRY & NEUROLOGY

## 2022-02-22 PROCEDURE — 99233 SBSQ HOSP IP/OBS HIGH 50: CPT | Performed by: PSYCHIATRY & NEUROLOGY

## 2022-02-22 PROCEDURE — 124N000003 HC R&B MH SENIOR/ADOLESCENT

## 2022-02-22 RX ORDER — LANOLIN ALCOHOL/MO/W.PET/CERES
3 CREAM (GRAM) TOPICAL AT BEDTIME
Status: DISCONTINUED | OUTPATIENT
Start: 2022-02-22 | End: 2022-02-24

## 2022-02-22 RX ADMIN — MELATONIN TAB 3 MG 3 MG: 3 TAB at 20:41

## 2022-02-22 RX ADMIN — BUSPIRONE HYDROCHLORIDE 30 MG: 15 TABLET ORAL at 20:41

## 2022-02-22 RX ADMIN — MIRTAZAPINE 15 MG: 15 TABLET, FILM COATED ORAL at 20:41

## 2022-02-22 RX ADMIN — DONEPEZIL HYDROCHLORIDE 10 MG: 10 TABLET ORAL at 20:41

## 2022-02-22 RX ADMIN — CLOZAPINE 200 MG: 100 TABLET ORAL at 20:41

## 2022-02-22 RX ADMIN — LEVOTHYROXINE SODIUM 88 MCG: 0.09 TABLET ORAL at 08:46

## 2022-02-22 RX ADMIN — MEMANTINE 10 MG: 10 TABLET ORAL at 08:46

## 2022-02-22 RX ADMIN — GABAPENTIN 100 MG: 100 CAPSULE ORAL at 17:33

## 2022-02-22 RX ADMIN — GABAPENTIN 100 MG: 100 CAPSULE ORAL at 08:45

## 2022-02-22 RX ADMIN — GABAPENTIN 100 MG: 100 CAPSULE ORAL at 13:03

## 2022-02-22 RX ADMIN — SALMETEROL XINAFOATE 1 PUFF: 50 POWDER, METERED ORAL; RESPIRATORY (INHALATION) at 08:45

## 2022-02-22 RX ADMIN — BUSPIRONE HYDROCHLORIDE 30 MG: 15 TABLET ORAL at 08:45

## 2022-02-22 RX ADMIN — HALOPERIDOL 1 MG: 1 TABLET ORAL at 20:41

## 2022-02-22 RX ADMIN — SALMETEROL XINAFOATE 1 PUFF: 50 POWDER, METERED ORAL; RESPIRATORY (INHALATION) at 20:38

## 2022-02-22 RX ADMIN — MEGESTROL ACETATE 20 MG: 20 TABLET ORAL at 08:46

## 2022-02-22 RX ADMIN — MEMANTINE 10 MG: 10 TABLET ORAL at 20:41

## 2022-02-22 RX ADMIN — ASPIRIN 81 MG: 81 TABLET, COATED ORAL at 08:46

## 2022-02-22 ASSESSMENT — ACTIVITIES OF DAILY LIVING (ADL)
LAUNDRY: UNABLE TO COMPLETE
HYGIENE/GROOMING: PROMPTS;INDEPENDENT
DRESS: SCRUBS (BEHAVIORAL HEALTH)
ORAL_HYGIENE: INDEPENDENT
LAUNDRY: UNABLE TO COMPLETE
HYGIENE/GROOMING: INDEPENDENT
ORAL_HYGIENE: PROMPTS;INDEPENDENT
DRESS: SCRUBS (BEHAVIORAL HEALTH)

## 2022-02-22 NOTE — PLAN OF CARE
Problem: Behavioral Health Plan of Care  Goal: Develops/Participates in Therapeutic Dawson to Support Successful Transition  Outcome: Ongoing, Not Progressing  Intervention: Foster Therapeutic Dawson  Recent Flowsheet Documentation  Taken 2/22/2022 1717 by Marilyn Monroe RN  Trust Relationship/Rapport:    care explained    choices provided    emotional support provided    empathic listening provided    questions answered    thoughts/feelings acknowledged    questions encouraged    reassurance provided       Pt presents with blunted, flat affect and calm mood. Denies SI/SIB and hallucinations. Pt remained isolated to his room most of the shift, only coming to the lounge for dinner when prompted. Pt denies having depression and anxiety. Pt is alert to self only. Reports that he is at [facility] and that the year is 1998. He asked this writer how old he is and just reported that he was born in the year 1965. Pt speech continues to be incoherent at times and soft. Appetite and fluid intake adequate - ate 75% of his dinner tray, takes fluids with encouragement. Prompted pt to go to group, but he refused reporting that he was tired. Encouraged pt to shower this evening but he refused. Provided pt with clean scrubs and brief but he refused to get up, reporting that he was tired. Denies pain. VSS. Medication compliant. No other concerns or complaints noted.

## 2022-02-22 NOTE — PROGRESS NOTES
"[facility], [place]   Psychiatric Progress Note        Interim History:   The patient's care was discussed with the treatment team during the daily team meeting and/or staff's chart notes were reviewed.  Staff report patient has been doing fairly well. Has an interview with [facility] today.     The patient reports that he is \"all right.\" Says that he is still not sleeping well and is tired. Open to trying a sleep aid. Eating well. Denies SI or HI. Denies AH.          Medications:       aspirin  81 mg Oral Daily     [Held by provider] atorvastatin  80 mg Oral At Bedtime     busPIRone HCl  30 mg Oral BID     cloZAPine  200 mg Oral At Bedtime     donepezil  10 mg Oral At Bedtime     gabapentin  100 mg Oral TID w/meals     haloperidol  1 mg Oral At Bedtime     levothyroxine  88 mcg Oral Daily     megestrol  20 mg Oral Daily     melatonin  3 mg Oral At Bedtime     memantine  10 mg Oral BID     mirtazapine  15 mg Oral At Bedtime     [Held by provider] polyethylene glycol  17 g Oral BID     salmeterol  1 puff Inhalation BID          Allergies:     Allergies   Allergen Reactions     Ibuprofen      Latex           Labs:     No results found for this or any previous visit (from the past 24 hour(s)).       Psychiatric Examination:     /80   Pulse 97   Temp 98.4  F (36.9  C) (Temporal)   Resp 16   Ht 1.778 m (5' 10\")   Wt 74.5 kg (164 lb 3.2 oz)   SpO2 96%   BMI 23.56 kg/m    Weight is 164 lbs 3.2 oz  Body mass index is 23.56 kg/m .  Orthostatic Vitals  Report      Most Recent      Standing Orthostatic /71 01/31 0819    Standing Orthostatic Pulse (bpm) 116 01/31 0819            Appearance: adequately groomed and fatigued  Attitude:  cooperative  Eye Contact:  good  Mood:  \"all right\"  Affect:  intensity is blunted  Speech:  paucity of speech, more verbal at times  Psychomotor Behavior:  no evidence of tardive dyskinesia, dystonia, or tics  Thought Process:  goal " oriented  Associations:  no loose associations  Thought Content:  no evidence of suicidal ideation or homicidal ideation and no evidence of psychotic thought  Insight:  limited  Judgement:  limited  Oriented to:  person only  Attention Span and Concentration:  fair  Recent and Remote Memory:  poor    Clinical Global Impressions  First:  Considering your total clinical experience with this particular patient population, how severe are the patient's symptoms at this time?: 6 (01/27/22 2157)  Compared to the patient's condition at the START of treatment, this patient's condition is: 3 (01/27/22 2157)  Most recent:  Considering your total clinical experience with this particular patient population, how severe are the patient's symptoms at this time?: 6 (01/27/22 2157)  Compared to the patient's condition at the START of treatment, this patient's condition is: 3 (01/27/22 2157)         Precautions:     Behavioral Orders   Procedures     Code 1 - Restrict to Unit     Code 2     For abdominal US     Fall precautions     Routine Programming     As clinically indicated     Status 15     Every 15 minutes.          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  He was hospitalized at [facility] for approximately one year and then transferred to station 3B mental health unit for ECT treatment where he was hospitalized 6/30/21 - 1/13/22 until it was discovered that he was COVID-19 positive. He was subsequently transferred to medicine service until 1/19/22 and then discharged to [facility] COVID-19 unit  until medically stable. He was readmitted to geriatric unit, station 3B, on 1/26/22. He is currently under commitment with Yanez being amended to include Clozaril.   Patient has been on Zyprexa, Haldol, Risperdal that were not effective. He is confused, disoriented and endorses visual hallucinations, however he denied auditory hallucinations. He is oriented to self only. Patient is unable to take care of himself in the community and the plan will be for the patient to have a guardian moving forward. Inpatient psychiatric hospitalization is warranted at this time for safety, stabilization, and possible adjustment in medications.     Target psychiatric symptoms and interventions:  Continue PTA medications, including:     BuSpar 30 mg 2 times daily  Clozaril 200 mg at bedtime  Aricept 10 mg at bedtime  Gabapentin 100 mg 3 times a day  Haldol 1 mg at bedtime  Namenda 10 mg 2 times a day  Remeron 15 mg at bedtime     Continue hydroxyzine 25 mg q4h prn for acute anxiety  Continue risperidone 1 mg q6h prn for severe agitation    Medicine following elevated LFTs. Statin, APAP and Trazodone held.     Add melatonin 3mg at bedtime.     Guardianship in process. Admission to group home. pending finalization of guardianship.     Disposition Plan   Reason for ongoing admission: is unable to care for self due to severe psychosis or mariusz  Discharge location: [facility]  Discharge Medications: not ordered  Follow-up Appointments: not scheduled  Legal Status: full commitment    Entered by: Wilton Morfin on February 22, 2022 at 10:20 AM

## 2022-02-22 NOTE — PLAN OF CARE
"Goal Outcome Evaluation:    Plan of Care Reviewed With: patient    Patient has been isolative and withdrawn. He came to the dining room for meals only. He ate 75% of his dinner. His affect is flat. His mood is calm. He denies SI and SIB. He denies AH stating \"not today\". He endorses depression and anxiety stating \"I always have both\". He denies pain. He is not social with peers and staff. He refused to attend groups this evening. He is medication compliant. He is untidy and disheveled. He refused to take a shower. He voices no concerns.                    "

## 2022-02-22 NOTE — PLAN OF CARE
Problem: Sleep Disturbance (Psychotic Signs/Symptoms)  Goal: Improved Sleep (Psychotic Signs/Symptoms)  Outcome: Ongoing, Progressing   Goal Outcome Evaluation:    Patient slept a total of  9 hours. No behavioral concerns raised the whole shift.

## 2022-02-22 NOTE — PLAN OF CARE
Assessment/Intervention/Current Symtoms and Care Coordination:    Current Symptoms include the following: Isolative, delusional statements.    Chart Review    Met with team to discuss patient care.    [facility] and nursing provided information first, then they talked with patient. Patient was appropriate during the Zoom interview, remained laying down and discussed his many mansions he can go to.     Ruth will review with her team about patient's fitness for their [facility]. She is concerned that he may be better fit for a SNF as at the [facility], they will encourage him to socialize in the milieu. Discussed previous barriers to SNF is his history of aggression and history of elopement/absconding from facilities.    TCM requesting face to face visit with patient tomorrow around 1p. Face to face is preferred due to patient struggles with engaging over the phone. CTC to follow up tomorrow.    Discharge Plan or Goal:  Pending stabilization & development of a safe discharge plan.  Patient interviewed at assisted living, may be better fit for SNF.    Barriers to Discharge:  History of aggression and elopement. Preference for remaining in bed over socializing. Lack of current guardian to make health decisions.    Referral Status:  No new referrals made    Legal Status:  Patient is under MI commitment in [facility] with Beau. [id number]     Contacts:  Case Management Services:  [facility]   Kurtis Chapin. Phone: [phone]  Supervisor is [email or web address]     Freida Talley  [phone]     [facility] Care:  Ruth Garcia, RN, BSN, [place], [facility]  President at [facility]  Phone: [phone] ([place])  Fax: [phone] ([place])  Mobile: [phone]  Web: [email or web address]  Email:  [email or web address]

## 2022-02-22 NOTE — PLAN OF CARE
Problem: Behavioral Health Plan of Care  Goal: Optimized Coping Skills in Response to Life Stressors  Outcome: Ongoing, Not Progressing   Goal Outcome Evaluation:    Plan of Care Reviewed With: patient     Behavioral  Pt spent the majority of this shift in his room sleeping. He did come out briefly during meals and ate parts of his meals. Pt denies all mental health symptoms denies anxiety, depression, SI, and AVH. Pt's mood was empty and presented with flat blunted affect. Pt is Comm and Yanez at this time.   Medical Alerts  Monitored for hypotension, fluids pushed. BP 11/80.   Plan  Continue to monitor

## 2022-02-23 LAB — HCV GENTYP SERPL NAA+PROBE: NORMAL

## 2022-02-23 PROCEDURE — 124N000003 HC R&B MH SENIOR/ADOLESCENT

## 2022-02-23 PROCEDURE — 250N000013 HC RX MED GY IP 250 OP 250 PS 637: Performed by: PSYCHIATRY & NEUROLOGY

## 2022-02-23 RX ADMIN — MIRTAZAPINE 15 MG: 15 TABLET, FILM COATED ORAL at 20:28

## 2022-02-23 RX ADMIN — MEMANTINE 10 MG: 10 TABLET ORAL at 20:28

## 2022-02-23 RX ADMIN — DONEPEZIL HYDROCHLORIDE 10 MG: 10 TABLET ORAL at 20:28

## 2022-02-23 RX ADMIN — MELATONIN TAB 3 MG 3 MG: 3 TAB at 20:28

## 2022-02-23 RX ADMIN — MEMANTINE 10 MG: 10 TABLET ORAL at 08:28

## 2022-02-23 RX ADMIN — HALOPERIDOL 1 MG: 1 TABLET ORAL at 20:28

## 2022-02-23 RX ADMIN — MEGESTROL ACETATE 20 MG: 20 TABLET ORAL at 08:28

## 2022-02-23 RX ADMIN — BUSPIRONE HYDROCHLORIDE 30 MG: 15 TABLET ORAL at 08:28

## 2022-02-23 RX ADMIN — GABAPENTIN 100 MG: 100 CAPSULE ORAL at 08:28

## 2022-02-23 RX ADMIN — BUSPIRONE HYDROCHLORIDE 30 MG: 15 TABLET ORAL at 20:28

## 2022-02-23 RX ADMIN — GABAPENTIN 100 MG: 100 CAPSULE ORAL at 11:39

## 2022-02-23 RX ADMIN — SALMETEROL XINAFOATE 1 PUFF: 50 POWDER, METERED ORAL; RESPIRATORY (INHALATION) at 11:43

## 2022-02-23 RX ADMIN — CLOZAPINE 200 MG: 100 TABLET ORAL at 20:28

## 2022-02-23 RX ADMIN — ASPIRIN 81 MG: 81 TABLET, COATED ORAL at 08:28

## 2022-02-23 RX ADMIN — SALMETEROL XINAFOATE 1 PUFF: 50 POWDER, METERED ORAL; RESPIRATORY (INHALATION) at 20:27

## 2022-02-23 RX ADMIN — LEVOTHYROXINE SODIUM 88 MCG: 0.09 TABLET ORAL at 08:28

## 2022-02-23 RX ADMIN — GABAPENTIN 100 MG: 100 CAPSULE ORAL at 17:28

## 2022-02-23 ASSESSMENT — ACTIVITIES OF DAILY LIVING (ADL)
DRESS: SCRUBS (BEHAVIORAL HEALTH)
LAUNDRY: UNABLE TO COMPLETE
ORAL_HYGIENE: PROMPTS;INDEPENDENT
LAUNDRY: UNABLE TO COMPLETE
ORAL_HYGIENE: PROMPTS;INDEPENDENT
HYGIENE/GROOMING: PROMPTS;INDEPENDENT
HYGIENE/GROOMING: PROMPTS;INDEPENDENT
DRESS: SCRUBS (BEHAVIORAL HEALTH)

## 2022-02-23 NOTE — PLAN OF CARE
Problem: Cognitive Impairment (Psychotic Signs/Symptoms)  Goal: Optimal Cognitive Function (Psychotic Signs/Symptoms)  Outcome: Ongoing, Progressing     Problem: Psychomotor Impairment (Psychotic Signs/Symptoms)  Goal: Improved Psychomotor Symptoms (Psychotic Signs/Symptoms)  Intervention: Manage Psychomotor Movement  Recent Flowsheet Documentation  Taken 2/23/2022 0900 by Domingo Celaya RN  Patient Performed Hygiene: patient refused  Activity (Behavioral Health): up ad holley   Goal Outcome Evaluation:    Plan of Care Reviewed With: patient        Patient had flat and blunted affect. Patient appeared depressed and had incoherent speech. Patient was oriented to self and disorientated to place, situation, and time. Patient comes out to the lounge for meal by prompting and encouragement. Spent most of the shift in the room, Did not socialize with peers and declined to attend group activities.  Patient denied pain, anxiety, depression/SI/HI/SIB/AH/VH and contracted for safety. Patient was medication complaint and had good appetite. Will continue to monitor.

## 2022-02-23 NOTE — PLAN OF CARE
Assessment/Intervention/Current Symtoms and Care Coordination:  Current Symptoms include the following: Isolative  Chart Review  Met with team to discuss patient care.  Patient had a visit with his  Kurtis winters.  Corresponded with SALVATORE Hull and Pilar Nelson at [facility] to schedule MNChoices assessment. Requested 3/1/22 at 1230p via zoom.    Discharge Plan or Goal:  Pending stabilization & development of a safe discharge plan.  Start referrals for SNF     Barriers to Discharge:  History of aggression and elopement. Preference for remaining in bed over socializing. Lack of current guardian to make health decisions.     Referral Status:  No new referrals made     Legal Status:  Patient is under MI commitment in [facility] with Beau. [id number]     Contacts:  Case Management Services:  [facility]   Kurtis Chapin. Phone: [phone]  [email or web address]  Supervisor is [email or web address]     Freida Machuca  Sister  [phone]     [facility] Care:  Ruth Garcia, RN, BSN, [place], [facility]  President at [facility]  Phone: [phone] ([place])  Fax: [phone] ([place])  Mobile: [phone]  Web: [email or web address]  Email:  [email or web address]      MONICA Baxter, Initial Assessment  Health and Human Services  Long Term Services and Supports  [address] [place] [address]  Office [phone]  Fax [phone]  Email: [email or web address]

## 2022-02-24 PROCEDURE — 99233 SBSQ HOSP IP/OBS HIGH 50: CPT | Performed by: PSYCHIATRY & NEUROLOGY

## 2022-02-24 PROCEDURE — 124N000003 HC R&B MH SENIOR/ADOLESCENT

## 2022-02-24 PROCEDURE — 250N000013 HC RX MED GY IP 250 OP 250 PS 637: Performed by: PSYCHIATRY & NEUROLOGY

## 2022-02-24 RX ADMIN — GABAPENTIN 100 MG: 100 CAPSULE ORAL at 08:52

## 2022-02-24 RX ADMIN — GABAPENTIN 100 MG: 100 CAPSULE ORAL at 17:49

## 2022-02-24 RX ADMIN — MEMANTINE 10 MG: 10 TABLET ORAL at 20:06

## 2022-02-24 RX ADMIN — CLOZAPINE 200 MG: 100 TABLET ORAL at 20:06

## 2022-02-24 RX ADMIN — BUSPIRONE HYDROCHLORIDE 30 MG: 15 TABLET ORAL at 20:06

## 2022-02-24 RX ADMIN — GABAPENTIN 100 MG: 100 CAPSULE ORAL at 12:22

## 2022-02-24 RX ADMIN — Medication 5 MG: at 20:06

## 2022-02-24 RX ADMIN — MEMANTINE 10 MG: 10 TABLET ORAL at 08:52

## 2022-02-24 RX ADMIN — SALMETEROL XINAFOATE 1 PUFF: 50 POWDER, METERED ORAL; RESPIRATORY (INHALATION) at 08:53

## 2022-02-24 RX ADMIN — MIRTAZAPINE 15 MG: 15 TABLET, FILM COATED ORAL at 20:06

## 2022-02-24 RX ADMIN — DONEPEZIL HYDROCHLORIDE 10 MG: 10 TABLET ORAL at 20:06

## 2022-02-24 RX ADMIN — LEVOTHYROXINE SODIUM 88 MCG: 0.09 TABLET ORAL at 08:52

## 2022-02-24 RX ADMIN — HALOPERIDOL 1 MG: 1 TABLET ORAL at 20:06

## 2022-02-24 RX ADMIN — ASPIRIN 81 MG: 81 TABLET, COATED ORAL at 08:52

## 2022-02-24 RX ADMIN — BUSPIRONE HYDROCHLORIDE 30 MG: 15 TABLET ORAL at 08:52

## 2022-02-24 RX ADMIN — MEGESTROL ACETATE 20 MG: 20 TABLET ORAL at 08:52

## 2022-02-24 RX ADMIN — SALMETEROL XINAFOATE 1 PUFF: 50 POWDER, METERED ORAL; RESPIRATORY (INHALATION) at 20:09

## 2022-02-24 ASSESSMENT — ACTIVITIES OF DAILY LIVING (ADL)
LAUNDRY: UNABLE TO COMPLETE
ORAL_HYGIENE: INDEPENDENT;PROMPTS
DRESS: INDEPENDENT;PROMPTS
HYGIENE/GROOMING: INDEPENDENT;PROMPTS
DRESS: INDEPENDENT;PROMPTS
HYGIENE/GROOMING: INDEPENDENT;PROMPTS
ORAL_HYGIENE: INDEPENDENT;PROMPTS

## 2022-02-24 NOTE — PROGRESS NOTES
"[facility], [place]   Psychiatric Progress Note        Interim History:   The patient's care was discussed with the treatment team during the daily team meeting and/or staff's chart notes were reviewed.  Staff report patient has been reporting poor sleep.     The patient reports that he is \"all right.\" Says that he hasn't come out for breakfast as he didn't sleep well even with the addition of melatonin. Is eating well. Denies SI. Does endorse AH. Can understand the voices but denies any commands. Agreeable to shower \"tomorrow morning.\"          Medications:       aspirin  81 mg Oral Daily     [Held by provider] atorvastatin  80 mg Oral At Bedtime     busPIRone HCl  30 mg Oral BID     cloZAPine  200 mg Oral At Bedtime     donepezil  10 mg Oral At Bedtime     gabapentin  100 mg Oral TID w/meals     haloperidol  1 mg Oral At Bedtime     levothyroxine  88 mcg Oral Daily     megestrol  20 mg Oral Daily     melatonin  5 mg Oral At Bedtime     memantine  10 mg Oral BID     mirtazapine  15 mg Oral At Bedtime     [Held by provider] polyethylene glycol  17 g Oral BID     salmeterol  1 puff Inhalation BID          Allergies:     Allergies   Allergen Reactions     Ibuprofen      Latex           Labs:     No results found for this or any previous visit (from the past 24 hour(s)).       Psychiatric Examination:     /69 (BP Location: Right arm, Patient Position: Left side)   Pulse 95   Temp 98.4  F (36.9  C) (Oral)   Resp 16   Ht 1.778 m (5' 10\")   Wt 74.5 kg (164 lb 3.2 oz)   SpO2 98%   BMI 23.56 kg/m    Weight is 164 lbs 3.2 oz  Body mass index is 23.56 kg/m .  Orthostatic Vitals  Report      Most Recent      Standing Orthostatic /71 01/31 0819    Standing Orthostatic Pulse (bpm) 116 01/31 0819            Appearance: adequately groomed and fatigued  Attitude:  cooperative  Eye Contact:  good  Mood:  \"all right\"  Affect:  intensity is blunted  Speech:  paucity of speech, more " verbal   Psychomotor Behavior:  no evidence of tardive dyskinesia, dystonia, or tics  Thought Process:  goal oriented  Associations:  no loose associations  Thought Content:  no evidence of suicidal ideation or homicidal ideation and auditory hallucinations present  Insight:  limited  Judgement:  limited  Oriented to:  person only  Attention Span and Concentration:  fair  Recent and Remote Memory:  poor    Clinical Global Impressions  First:  Considering your total clinical experience with this particular patient population, how severe are the patient's symptoms at this time?: 6 (01/27/22 2157)  Compared to the patient's condition at the START of treatment, this patient's condition is: 3 (01/27/22 2157)  Most recent:  Considering your total clinical experience with this particular patient population, how severe are the patient's symptoms at this time?: 6 (01/27/22 2157)  Compared to the patient's condition at the START of treatment, this patient's condition is: 3 (01/27/22 2157)         Precautions:     Behavioral Orders   Procedures     Code 1 - Restrict to Unit     Code 2     For abdominal US     Fall precautions     Routine Programming     As clinically indicated     Status 15     Every 15 minutes.          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  He was hospitalized at [facility] for approximately one year and then transferred to [facility] 3B mental health unit for ECT treatment where he was hospitalized 6/30/21 - 1/13/22 until it was discovered that he was COVID-19 positive. He was  subsequently transferred to medicine service until 1/19/22 and then discharged to [place] [id number] unit until medically stable. He was readmitted to geriatric unit, station 3B, on 1/26/22. He is currently under commitment with Yanez being amended to include Clozaril.   Patient has been on Zyprexa, Haldol, Risperdal that were not effective. He is confused, disoriented and endorses visual hallucinations, however he denied auditory hallucinations. He is oriented to self only. Patient is unable to take care of himself in the community and the plan will be for the patient to have a guardian moving forward. Inpatient psychiatric hospitalization is warranted at this time for safety, stabilization, and possible adjustment in medications.     Target psychiatric symptoms and interventions:  Continue PTA medications, including:     BuSpar 30 mg 2 times daily  Clozaril 200 mg at bedtime  Aricept 10 mg at bedtime  Gabapentin 100 mg 3 times a day  Haldol 1 mg at bedtime  Namenda 10 mg 2 times a day  Remeron 15 mg at bedtime     Continue hydroxyzine 25 mg q4h prn for acute anxiety  Continue risperidone 1 mg q6h prn for severe agitation    Medicine following elevated LFTs. Statin, APAP and Trazodone held.     Increase melatonin to 5mg at bedtime.     Guardianship in process. Admission to group home. pending finalization of guardianship.     Disposition Plan   Reason for ongoing admission: is unable to care for self due to severe psychosis or mariusz  Discharge location: intermediate  Discharge Medications: not ordered  Follow-up Appointments: not scheduled  Legal Status: full commitment    Entered by: Wilton Morfin on February 24, 2022 at 10:16 AM

## 2022-02-24 NOTE — PLAN OF CARE
Problem: Sleep Disturbance (Psychotic Signs/Symptoms)  Goal: Improved Sleep (Psychotic Signs/Symptoms)  Outcome: Ongoing, Progressing   Goal Outcome Evaluation:    Plan of Care Reviewed With: patient     Patient slept a total of 8.5 hours. He got up and voided in the bathroom once. No behavioral concerns noted the whole shift.

## 2022-02-24 NOTE — PLAN OF CARE
"  Problem: Behavior Regulation Impairment (Psychotic Signs/Symptoms)  Goal: Improved Behavioral Control (Psychotic Signs/Symptoms)  Outcome: Ongoing, Progressing   Goal Outcome Evaluation:    Plan of Care Reviewed With: patient                   Nursing Assessment    Psychosis (H) [F29]    Admit Date: 1/26/2022    Length of Stay: 29    Patient evaluation continues. Assessed mood,anxiety,thoughts and behavior. Patient is progressing towards goals. Patient is encouraged to participate in groups and assisted to develop healthy coping skills.  Patient reports auditory  hallucinations. /75   Pulse 109   Temp 96.9  F (36.1  C) (Temporal)   Resp 18   Ht 1.778 m (5' 10\")   Wt 74.5 kg (164 lb 3.2 oz)   SpO2 99%   BMI 23.56 kg/m      Mood: ok    Patient reports depression none and reports anxiety little    Affect:flat and blunted    Sleep: poor per pt    Appetite: good    SI: denies    HI: denies    SIB: denies      Medication Compliance yes    Group participation:no    ADL's: independent with reminders refuses at times    Fall risk interventions: proper foot wear, orthostatic B/P    Rinku Score Interventions:none    Discharge planning in process    Refer to daily team meeting notes for individualized plan of care. Nursing will continue to assess.    *Scale is 1-10 and 10 is the worst.         "

## 2022-02-24 NOTE — PLAN OF CARE
"Problem: Cognitive Impairment (Psychotic Signs/Symptoms)  Goal: Optimal Cognitive Function (Psychotic Signs/Symptoms)  Outcome: Ongoing, Not Progressing  Intervention: Support and Promote Cognitive Ability  Recent Flowsheet Documentation  Taken 2/23/2022 1752 by Marilyn Monroe RN  Trust Relationship/Rapport:    care explained    choices provided    emotional support provided    empathic listening provided    questions answered    questions encouraged    reassurance provided    thoughts/feelings acknowledged    Pt presents with blunted, flat affect and calm mood. No SI/SIB noted this shift. Pt noted to be experiencing auditory hallucinations and responding to internal stimuli. Pt remained mostly isolated to his room. Did come out to play Bingo with peers for a short period of time and did eat dinner in the lounge. Pt continues to be oriented to self only. Pt not very social with writer this evening and will just stare at writer when a question is asked of him. States \"I am tired.\" Pt appears unkempt but is resistant to showering. Linens on his bed were changed and his room was cleaned up as he had numerous cups around his room. Pt only consumed about 50% of his dinner tray, takes in fluids with encouragement, pt was provided with a new water pitcher which was filled with fresh water this evening for his bedside table. Denies pain. VSS. Medication compliant, no PRNs given. No other concerns or complaints noted this evening.              "

## 2022-02-24 NOTE — PLAN OF CARE
Assessment/Intervention/Current Symtoms and Care Coordination:    Current Symptoms include the following: Isolative    Chart Review    Met with team to discuss patient care.    Patient had a visit with his  Kurtis winters.    Corresponded with SALVATORE Hull and Pilar Nelson at [facility] to schedule MNChoices assessment. Requested 3/1/22 at 1230p via zoom.     Discharge Plan or Goal:  Pending stabilization & development of a safe discharge plan.  Start referrals for SNF     Barriers to Discharge:  History of aggression and elopement. Preference for remaining in bed over socializing. Lack of current guardian to make health decisions.     Referral Status:  No new referrals made     Legal Status:  Patient is under MI commitment in [facility] with Beau. [id number]     Contacts:  Case Management Services:  [facility]   Kurtis Chapin. Phone: [phone]  [email or web address]  Supervisor is [email or web address]     Freida Machuca  Sister  [phone]     [facility] Care:  Ruth Garcia, RN, BSN, [place], [facility]  President at [facility]  Phone: [phone] ([place])  Fax: [phone] ([place])  Mobile: [phone]  Web: [email or web address]  Email:  [email or web address]        MONICA Baxter, Initial Assessment  Health and Human Services  Long Term Services and Supports  [address] [place] [address]  Office [phone]  Fax [phone]  Email: [email or web address]

## 2022-02-25 PROCEDURE — 250N000013 HC RX MED GY IP 250 OP 250 PS 637: Performed by: PSYCHIATRY & NEUROLOGY

## 2022-02-25 PROCEDURE — 124N000003 HC R&B MH SENIOR/ADOLESCENT

## 2022-02-25 PROCEDURE — 99233 SBSQ HOSP IP/OBS HIGH 50: CPT | Performed by: PSYCHIATRY & NEUROLOGY

## 2022-02-25 RX ADMIN — GABAPENTIN 100 MG: 100 CAPSULE ORAL at 18:42

## 2022-02-25 RX ADMIN — GABAPENTIN 100 MG: 100 CAPSULE ORAL at 12:19

## 2022-02-25 RX ADMIN — MEMANTINE 10 MG: 10 TABLET ORAL at 20:41

## 2022-02-25 RX ADMIN — MEGESTROL ACETATE 20 MG: 20 TABLET ORAL at 09:04

## 2022-02-25 RX ADMIN — DONEPEZIL HYDROCHLORIDE 10 MG: 10 TABLET ORAL at 20:41

## 2022-02-25 RX ADMIN — SALMETEROL XINAFOATE 1 PUFF: 50 POWDER, METERED ORAL; RESPIRATORY (INHALATION) at 09:05

## 2022-02-25 RX ADMIN — Medication 5 MG: at 20:41

## 2022-02-25 RX ADMIN — BUSPIRONE HYDROCHLORIDE 30 MG: 15 TABLET ORAL at 20:41

## 2022-02-25 RX ADMIN — SALMETEROL XINAFOATE 1 PUFF: 50 POWDER, METERED ORAL; RESPIRATORY (INHALATION) at 20:42

## 2022-02-25 RX ADMIN — ASPIRIN 81 MG: 81 TABLET, COATED ORAL at 09:04

## 2022-02-25 RX ADMIN — MIRTAZAPINE 15 MG: 15 TABLET, FILM COATED ORAL at 20:41

## 2022-02-25 RX ADMIN — HALOPERIDOL 1 MG: 1 TABLET ORAL at 20:41

## 2022-02-25 RX ADMIN — LEVOTHYROXINE SODIUM 88 MCG: 0.09 TABLET ORAL at 09:04

## 2022-02-25 RX ADMIN — BUSPIRONE HYDROCHLORIDE 30 MG: 15 TABLET ORAL at 09:04

## 2022-02-25 RX ADMIN — CLOZAPINE 200 MG: 100 TABLET ORAL at 20:41

## 2022-02-25 RX ADMIN — GABAPENTIN 100 MG: 100 CAPSULE ORAL at 09:04

## 2022-02-25 RX ADMIN — MEMANTINE 10 MG: 10 TABLET ORAL at 09:04

## 2022-02-25 ASSESSMENT — ACTIVITIES OF DAILY LIVING (ADL)
DRESS: PROMPTS;INDEPENDENT
HYGIENE/GROOMING: PROMPTS;INDEPENDENT
ORAL_HYGIENE: PROMPTS;INDEPENDENT
HYGIENE/GROOMING: INDEPENDENT;PROMPTS
DRESS: INDEPENDENT;PROMPTS
ORAL_HYGIENE: INDEPENDENT;PROMPTS
LAUNDRY: UNABLE TO COMPLETE

## 2022-02-25 NOTE — PLAN OF CARE
Problem: Sleep Disturbance (Psychotic Signs/Symptoms)  Goal: Improved Sleep (Psychotic Signs/Symptoms)  Outcome: Ongoing, Progressing   Goal Outcome Evaluation:    Plan of Care Reviewed With: patient     Patient slept a total of 11.75  hours without any behavioral concerns raised the whole shift. Went to the bathroom and voided once during the shift.

## 2022-02-25 NOTE — PLAN OF CARE
"Nursing Assessment    Psychosis (H) [F29]    Admit Date: 1/26/2022    Length of Stay: 30    Patient evaluation continues. Assessed mood,anxiety,thoughts and behavior. Patient is progressing towards goals. Patient is encouraged to participate in groups and assisted to develop healthy coping skills.  Patient verbalizes auditory hallucinations that are not command nor negative in nature. /69   Pulse 114   Temp 97  F (36.1  C) (Temporal)   Resp 16   Ht 1.778 m (5' 10\")   Wt 74.5 kg (164 lb 3.2 oz)   SpO2 100%   BMI 23.56 kg/m      Mood: ok    Patient reports depression none and reports anxiety 2/10    Affect: flat blunted    Sleep: 10 hours last night    Appetite: good this shift has come out  to eat    SI: denies    HI: denies    SIB: denies      Medication Compliance yes    Group participation:none, refuses    ADL's: refuses, will at times take a shower    Fall risk interventions: proper foot wear, orthostatic B/p    Rinku Score Interventions:none    Discharge planning in process    Refer to daily team meeting notes for individualized plan of care. Nursing will continue to assess.    *Scale is 1-10 and 10 is the worst.         Problem: Behavior Regulation Impairment (Psychotic Signs/Symptoms)  Goal: Improved Behavioral Control (Psychotic Signs/Symptoms)  Outcome: Met   Goal Outcome Evaluation:    Plan of Care Reviewed With: patient                     "

## 2022-02-25 NOTE — PLAN OF CARE
Problem: Mood Impairment (Psychotic Signs/Symptoms)  Goal: Improved Mood Symptoms (Psychotic Signs/Symptoms)  Intervention: Optimize Emotion and Mood  Recent Flowsheet Documentation  Taken 2/24/2022 1900 by Nancy Kerr RN  Diversional Activity: (joined group for binmadhav) other (see comments)   Goal Outcome Evaluation:    Plan of Care Reviewed With: patient     Patient calm,pleasant,cooperative and sociable with peers and staff this shift. Medication compliant, allowed for vitals to be obtained and present for dinner for which he ate majority. Patient visualized attending group and out in the lounge this evening. During MH check in, patient rates his anxiety at a 1 out of 10, and his depression a 5 out of 10. He denies any SI,SIB,HI but, does endorse auditory hallucinations but, denies visual hallucinations. Patient slept quite a lot off/on this shift however, he stated he doesn't sleep much at night therefore that's why he's so tired. Patient still quite hypotensive ( 105/74) however, he reports no dizziness and he's gait is steady. Patient also very diligent this shift with pushing fluids. No other incidents/complaints noted on this shift.

## 2022-02-25 NOTE — PROGRESS NOTES
"[facility], [place]   Psychiatric Progress Note        Interim History:   The patient's care was discussed with the treatment team during the daily team meeting and/or staff's chart notes were reviewed.  Staff report patient has been reporting AH but \"is vague about them.\" Slept 12 hours.     The patient reports that he is \"all right.\" Does report improved sleep on the 5mg of melatonin. Eating well. Denies SI. Has had recent AH. Agreeable to shower this afternoon.          Medications:       aspirin  81 mg Oral Daily     [Held by provider] atorvastatin  80 mg Oral At Bedtime     busPIRone HCl  30 mg Oral BID     cloZAPine  200 mg Oral At Bedtime     donepezil  10 mg Oral At Bedtime     gabapentin  100 mg Oral TID w/meals     haloperidol  1 mg Oral At Bedtime     levothyroxine  88 mcg Oral Daily     megestrol  20 mg Oral Daily     melatonin  5 mg Oral At Bedtime     memantine  10 mg Oral BID     mirtazapine  15 mg Oral At Bedtime     [Held by provider] polyethylene glycol  17 g Oral BID     salmeterol  1 puff Inhalation BID          Allergies:     Allergies   Allergen Reactions     Ibuprofen      Latex           Labs:     No results found for this or any previous visit (from the past 24 hour(s)).       Psychiatric Examination:     /69   Pulse 114   Temp 97  F (36.1  C) (Temporal)   Resp 16   Ht 1.778 m (5' 10\")   Wt 74.5 kg (164 lb 3.2 oz)   SpO2 100%   BMI 23.56 kg/m    Weight is 164 lbs 3.2 oz  Body mass index is 23.56 kg/m .  Orthostatic Vitals  Report      Most Recent      Standing Orthostatic /71 01/31 0819    Standing Orthostatic Pulse (bpm) 116 01/31 0819            Appearance: awake, alert and poorly groomed  Attitude:  cooperative  Eye Contact:  good  Mood:  \"all right\"  Affect:  intensity is blunted  Speech:  paucity of speech, more verbal   Psychomotor Behavior:  no evidence of tardive dyskinesia, dystonia, or tics  Thought Process:  goal " oriented  Associations:  no loose associations  Thought Content:  no evidence of suicidal ideation or homicidal ideation and auditory hallucinations present. Denies commands.   Insight:  limited  Judgement:  limited  Oriented to:  person only  Attention Span and Concentration:  fair  Recent and Remote Memory:  poor    Clinical Global Impressions  First:  Considering your total clinical experience with this particular patient population, how severe are the patient's symptoms at this time?: 6 (01/27/22 2157)  Compared to the patient's condition at the START of treatment, this patient's condition is: 3 (01/27/22 2157)  Most recent:  Considering your total clinical experience with this particular patient population, how severe are the patient's symptoms at this time?: 6 (01/27/22 2157)  Compared to the patient's condition at the START of treatment, this patient's condition is: 3 (01/27/22 2157)         Precautions:     Behavioral Orders   Procedures     Code 1 - Restrict to Unit     Code 2     For abdominal US     Fall precautions     Routine Programming     As clinically indicated     Status 15     Every 15 minutes.          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  He was hospitalized at [facility] for approximately one year and then transferred to station 3B mental health unit for ECT treatment where he was hospitalized 6/30/21 - 1/13/22 until it was discovered that he was COVID-19 positive. He was subsequently transferred to medicine service until 1/19/22 and then discharged to [facility]-19 [facility] until medically stable. He was readmitted to geriatric unit, station 3B, on 1/26/22. He is currently under commitment with Yanez being amended to include Clozaril.   Patient has been on Zyprexa, Haldol, Risperdal that were not effective. He is confused, disoriented and endorses visual hallucinations, however he denied auditory hallucinations. He is oriented to self only. Patient is unable to take care of himself in the community and the plan will be for the patient to have a guardian moving forward. Inpatient psychiatric hospitalization is warranted at this time for safety, stabilization, and possible adjustment in medications.     Target psychiatric symptoms and interventions:  Continue PTA medications, including:     BuSpar 30 mg 2 times daily  Clozaril 200 mg at bedtime  Aricept 10 mg at bedtime  Gabapentin 100 mg 3 times a day  Haldol 1 mg at bedtime  Namenda 10 mg 2 times a day  Remeron 15 mg at bedtime     Continue hydroxyzine 25 mg q4h prn for acute anxiety  Continue risperidone 1 mg q6h prn for severe agitation    Medicine following elevated LFTs. Statin, APAP and Trazodone held.     Continue melatonin 5mg at bedtime.     Guardianship in process. Admission to group home. pending finalization of guardianship.     Disposition Plan   Reason for ongoing admission: is unable to care for self due to severe psychosis or mariusz  Discharge location: [place]  Discharge Medications: not ordered  Follow-up Appointments: not scheduled  Legal Status: full commitment    Entered by: Wilton Morfin on February 25, 2022 at 10:36 AM

## 2022-02-25 NOTE — PLAN OF CARE
Assessment/Intervention/Current Symtoms and Care Coordination:  Patient reports depression as 2 out of 10.  He endorses auditory hallucinations.  He has been out of his room for meals, not attending groups. Sleep and food intake are adequate at this time.  Working with [facility] and  for placement.  Nothing finalized yet.   [facility] assessment has been scheduled for 3/1/22 @ 12:30pm and link has been sent to [facility].     Discharge Plan or Goal:  Pending stabilization & development of a safe discharge plan.  Start referrals for SNF     Barriers to Discharge:  History of aggression and elopement.  Lack of current guardian to make health decisions.  Awaiting [facility] assessment.     Referral Status:  No new referrals made     Legal Status:  Patient is under MI commitment in [facility] with Beau. [id number]

## 2022-02-26 PROCEDURE — 250N000013 HC RX MED GY IP 250 OP 250 PS 637: Performed by: PSYCHIATRY & NEUROLOGY

## 2022-02-26 PROCEDURE — 124N000003 HC R&B MH SENIOR/ADOLESCENT

## 2022-02-26 RX ADMIN — CLOZAPINE 200 MG: 100 TABLET ORAL at 20:08

## 2022-02-26 RX ADMIN — GABAPENTIN 100 MG: 100 CAPSULE ORAL at 17:15

## 2022-02-26 RX ADMIN — BUSPIRONE HYDROCHLORIDE 30 MG: 15 TABLET ORAL at 08:12

## 2022-02-26 RX ADMIN — MIRTAZAPINE 15 MG: 15 TABLET, FILM COATED ORAL at 20:08

## 2022-02-26 RX ADMIN — MEMANTINE 10 MG: 10 TABLET ORAL at 20:08

## 2022-02-26 RX ADMIN — GABAPENTIN 100 MG: 100 CAPSULE ORAL at 08:12

## 2022-02-26 RX ADMIN — MEMANTINE 10 MG: 10 TABLET ORAL at 08:12

## 2022-02-26 RX ADMIN — DONEPEZIL HYDROCHLORIDE 10 MG: 10 TABLET ORAL at 20:08

## 2022-02-26 RX ADMIN — GABAPENTIN 100 MG: 100 CAPSULE ORAL at 12:47

## 2022-02-26 RX ADMIN — SALMETEROL XINAFOATE 1 PUFF: 50 POWDER, METERED ORAL; RESPIRATORY (INHALATION) at 20:08

## 2022-02-26 RX ADMIN — MEGESTROL ACETATE 20 MG: 20 TABLET ORAL at 08:12

## 2022-02-26 RX ADMIN — ASPIRIN 81 MG: 81 TABLET, COATED ORAL at 08:12

## 2022-02-26 RX ADMIN — SALMETEROL XINAFOATE 1 PUFF: 50 POWDER, METERED ORAL; RESPIRATORY (INHALATION) at 08:13

## 2022-02-26 RX ADMIN — LEVOTHYROXINE SODIUM 88 MCG: 0.09 TABLET ORAL at 08:12

## 2022-02-26 RX ADMIN — Medication 5 MG: at 20:10

## 2022-02-26 RX ADMIN — HALOPERIDOL 1 MG: 1 TABLET ORAL at 20:08

## 2022-02-26 RX ADMIN — BUSPIRONE HYDROCHLORIDE 30 MG: 15 TABLET ORAL at 20:08

## 2022-02-26 ASSESSMENT — ACTIVITIES OF DAILY LIVING (ADL)
ORAL_HYGIENE: INDEPENDENT;PROMPTS
ORAL_HYGIENE: PROMPTS;INDEPENDENT
DRESS: SCRUBS (BEHAVIORAL HEALTH);INDEPENDENT
HYGIENE/GROOMING: INDEPENDENT;PROMPTS
LAUNDRY: UNABLE TO COMPLETE
DRESS: PROMPTS;INDEPENDENT
HYGIENE/GROOMING: PROMPTS;INDEPENDENT

## 2022-02-26 NOTE — PLAN OF CARE
Goal Outcome Evaluation:    Plan of Care Reviewed With: patient     Patient alert and oriented to person, place, and time, adequately groomed. Pt presents with flat affect, intermittent eye contact, cooperative, medication compliant. No PRN's given. Pt on falls precautions, no issues observed. Pt contracts for safety. Pt isolates in room, observed in milieu for meals, ate well, attended one group activity, appropriate with peers and staff. Pt communicates and verbalizes needs to staff. No behaviors noted. Will continue to monitor behaviors and encourage engagement.     NURSING ASSESSMENT  Pain: denies  Anxiety: denies  Depression: denies  SI: denies  SIB: none observed  HI: denies  AVH: did not appear to be responding to internal stimuli, did not demonstrate delusional thinking.  Mood/Affect: blunted   Sleep:   Medication: compliant, no side effects reported or observed  PRN: none  Appetite: breakfast 100%     lunch 100%  ADLs: independent   Visits: none  Vitals: WNL   Medical: denies     Problem: Behavioral Health Plan of Care  Goal: Plan of Care Review  Outcome: Ongoing, Progressing  Flowsheets (Taken 2/26/2022 1400)  Plan of Care Reviewed With: patient  Patient Agreement with Plan of Care: agrees     Problem: Behavior Regulation Impairment (Psychotic Signs/Symptoms)  Goal: Improved Behavioral Control (Psychotic Signs/Symptoms)  Outcome: Ongoing, Progressing     Problem: Decreased Participation and Engagement (Psychotic Signs/Symptoms)  Goal: Increased Participation and Engagement (Psychotic Signs/Symptoms)  Outcome: Ongoing, Progressing  Intervention: Facilitate Participation and Engagement  Recent Flowsheet Documentation  Taken 2/26/2022 1400 by Lu Clark, RN  Diversional Activity: structured exercise

## 2022-02-26 NOTE — PLAN OF CARE
Problem: Sleep Disturbance (Psychotic Signs/Symptoms)  Goal: Improved Sleep (Psychotic Signs/Symptoms)  Outcome: Ongoing, Progressing   Goal Outcome Evaluation:    Plan of Care Reviewed With: patient      Patient slept a total of 13 hours without any behavioral concerns raised the whole night. He did not get up to void even a single time during the shift.

## 2022-02-26 NOTE — PLAN OF CARE
"Goal Outcome Evaluation:    Plan of Care Reviewed With: patient  No change.    Patient has been isolative and withdrawn. He came to the dining room for dinner only. He ate 75%. His BP's remain soft. He is encouraged to drink fluids. His affect is flat. His mood is calm. He denies SI and SIB. He denies AH tonight. He denies depression and anxiety. He is alert to self only. He voiced concern about being here for so long and stated \"when am I going to get picked up today?\". He is not social with peers and staff. He is disheveled but refused to take a shower. He refused to attend groups. He has been sleeping all shift. He is medication compliant. He denies pain.                     "

## 2022-02-27 PROCEDURE — 250N000013 HC RX MED GY IP 250 OP 250 PS 637: Performed by: PSYCHIATRY & NEUROLOGY

## 2022-02-27 PROCEDURE — 124N000003 HC R&B MH SENIOR/ADOLESCENT

## 2022-02-27 RX ADMIN — BUSPIRONE HYDROCHLORIDE 30 MG: 15 TABLET ORAL at 08:07

## 2022-02-27 RX ADMIN — GABAPENTIN 100 MG: 100 CAPSULE ORAL at 08:07

## 2022-02-27 RX ADMIN — MEGESTROL ACETATE 20 MG: 20 TABLET ORAL at 08:07

## 2022-02-27 RX ADMIN — ASPIRIN 81 MG: 81 TABLET, COATED ORAL at 08:07

## 2022-02-27 RX ADMIN — CLOZAPINE 200 MG: 100 TABLET ORAL at 20:23

## 2022-02-27 RX ADMIN — DONEPEZIL HYDROCHLORIDE 10 MG: 10 TABLET ORAL at 20:23

## 2022-02-27 RX ADMIN — LEVOTHYROXINE SODIUM 88 MCG: 0.09 TABLET ORAL at 08:07

## 2022-02-27 RX ADMIN — GABAPENTIN 100 MG: 100 CAPSULE ORAL at 16:42

## 2022-02-27 RX ADMIN — GABAPENTIN 100 MG: 100 CAPSULE ORAL at 11:58

## 2022-02-27 RX ADMIN — MEMANTINE 10 MG: 10 TABLET ORAL at 08:07

## 2022-02-27 RX ADMIN — MEMANTINE 10 MG: 10 TABLET ORAL at 20:23

## 2022-02-27 RX ADMIN — Medication 5 MG: at 20:23

## 2022-02-27 RX ADMIN — BUSPIRONE HYDROCHLORIDE 30 MG: 15 TABLET ORAL at 20:23

## 2022-02-27 RX ADMIN — MIRTAZAPINE 15 MG: 15 TABLET, FILM COATED ORAL at 20:23

## 2022-02-27 RX ADMIN — SALMETEROL XINAFOATE 1 PUFF: 50 POWDER, METERED ORAL; RESPIRATORY (INHALATION) at 08:06

## 2022-02-27 RX ADMIN — HALOPERIDOL 1 MG: 1 TABLET ORAL at 20:23

## 2022-02-27 RX ADMIN — SALMETEROL XINAFOATE 1 PUFF: 50 POWDER, METERED ORAL; RESPIRATORY (INHALATION) at 20:24

## 2022-02-27 ASSESSMENT — ACTIVITIES OF DAILY LIVING (ADL)
ORAL_HYGIENE: PROMPTS;INDEPENDENT
DRESS: SCRUBS (BEHAVIORAL HEALTH);INDEPENDENT
HYGIENE/GROOMING: PROMPTS;INDEPENDENT
HYGIENE/GROOMING: PROMPTS;INDEPENDENT
LAUNDRY: UNABLE TO COMPLETE
LAUNDRY: UNABLE TO COMPLETE
ORAL_HYGIENE: PROMPTS;INDEPENDENT
DRESS: SCRUBS (BEHAVIORAL HEALTH)

## 2022-02-27 NOTE — PLAN OF CARE
Problem: Sleep Disturbance (Psychotic Signs/Symptoms)  Goal: Improved Sleep (Psychotic Signs/Symptoms)  Outcome: Ongoing, Progressing    Patient slept well the whole shift, a total of 11 hours.  Did not get up at all to void even once during the shift.

## 2022-02-27 NOTE — PLAN OF CARE
"Goal Outcome Evaluation:    Plan of Care Reviewed With: patient     Patient has remained in his room all shift only coming out for dinner. He ate 50%. He has had good fluid intake. He denies SI and SIB. He denies AH stating \"not today\". He denies depression and anxiety. He is alert to self only. He refused to attend groups. He does not speak unless spoken to. He is not social with peers or staff. His affect is flat. His mood is calm. He is untidy and disheveled. Her refused to take a shower. He denies pain. He is medication compliant. He has been sleeping all shift. He voices no concerns.                    "

## 2022-02-27 NOTE — PLAN OF CARE
"Problem: Behavioral Health Plan of Care  Goal: Plan of Care Review  Outcome: Ongoing, Not Progressing  Flowsheets (Taken 2/27/2022 1030)  Plan of Care Reviewed With: patient  Patient Agreement with Plan of Care: unable to participate     Pt presents with blunted, flat affect and irritable mood. Denies SI/SIB. Pt does endorse auditory hallucinations, just states they are \"people talking.\" Pt was noted this morning at breakfast to be responding to internal stimuli, he was shouting, \"Leave me the fuck alone, you bitch!\" Pt speech remains soft/quiet and at times difficult to understand due to mumbling. He continues to report that he is at [facility] and reported the date as August 15th. When pt was asked about the year, he asked if he was right about the date and would not give an answer for the year, pt was advised on the correct date and stated, \"So that means I have been here over a year now?\" He remained isolated to his room most of the shift, only coming out for meals and does not socialize with peers. Pt is medication compliant, no PRNs given. Pt became irritated with writer when attempting to show pt how to correctly use inhaler, to which he threw inhaler on the table after using it. Appetite and fluid intake are adequate, needs encouragement to push fluids, ate 50% of breakfast and 75% of lunch. Pt noted to be tachycardic this morning, HR was 123, on recheck HR 95, all other VS WNL. Denies pain. Pt noted to have body odor and appears unkept, attempts at having pt shower or perform ADLs are unsuccessful as pt refuses. No other concerns or complaints noted.                   "

## 2022-02-28 PROCEDURE — 250N000013 HC RX MED GY IP 250 OP 250 PS 637: Performed by: PSYCHIATRY & NEUROLOGY

## 2022-02-28 PROCEDURE — 99233 SBSQ HOSP IP/OBS HIGH 50: CPT | Performed by: PSYCHIATRY & NEUROLOGY

## 2022-02-28 PROCEDURE — 124N000003 HC R&B MH SENIOR/ADOLESCENT

## 2022-02-28 RX ADMIN — MEMANTINE 10 MG: 10 TABLET ORAL at 08:49

## 2022-02-28 RX ADMIN — Medication 5 MG: at 20:34

## 2022-02-28 RX ADMIN — MEMANTINE 10 MG: 10 TABLET ORAL at 20:34

## 2022-02-28 RX ADMIN — MIRTAZAPINE 15 MG: 15 TABLET, FILM COATED ORAL at 20:34

## 2022-02-28 RX ADMIN — SALMETEROL XINAFOATE 1 PUFF: 50 POWDER, METERED ORAL; RESPIRATORY (INHALATION) at 08:49

## 2022-02-28 RX ADMIN — BUSPIRONE HYDROCHLORIDE 30 MG: 15 TABLET ORAL at 20:33

## 2022-02-28 RX ADMIN — LEVOTHYROXINE SODIUM 88 MCG: 0.09 TABLET ORAL at 08:49

## 2022-02-28 RX ADMIN — CLOZAPINE 200 MG: 100 TABLET ORAL at 20:33

## 2022-02-28 RX ADMIN — HALOPERIDOL 1 MG: 1 TABLET ORAL at 20:34

## 2022-02-28 RX ADMIN — ASPIRIN 81 MG: 81 TABLET, COATED ORAL at 08:49

## 2022-02-28 RX ADMIN — GABAPENTIN 100 MG: 100 CAPSULE ORAL at 12:32

## 2022-02-28 RX ADMIN — GABAPENTIN 100 MG: 100 CAPSULE ORAL at 17:06

## 2022-02-28 RX ADMIN — GABAPENTIN 100 MG: 100 CAPSULE ORAL at 08:49

## 2022-02-28 RX ADMIN — MEGESTROL ACETATE 20 MG: 20 TABLET ORAL at 08:49

## 2022-02-28 RX ADMIN — SALMETEROL XINAFOATE 1 PUFF: 50 POWDER, METERED ORAL; RESPIRATORY (INHALATION) at 20:33

## 2022-02-28 RX ADMIN — BUSPIRONE HYDROCHLORIDE 30 MG: 15 TABLET ORAL at 08:49

## 2022-02-28 RX ADMIN — DONEPEZIL HYDROCHLORIDE 10 MG: 10 TABLET ORAL at 20:36

## 2022-02-28 ASSESSMENT — ACTIVITIES OF DAILY LIVING (ADL)
LAUNDRY: UNABLE TO COMPLETE
LAUNDRY: UNABLE TO COMPLETE
HYGIENE/GROOMING: PROMPTS
DRESS: PROMPTS;SCRUBS (BEHAVIORAL HEALTH)
DRESS: SCRUBS (BEHAVIORAL HEALTH)
ORAL_HYGIENE: PROMPTS
ORAL_HYGIENE: INDEPENDENT;PROMPTS
HYGIENE/GROOMING: INDEPENDENT;PROMPTS

## 2022-02-28 NOTE — PLAN OF CARE
02/28/22 1524   Individualization/Patient Specific Goals   Patient Personal Strengths resourceful;resilient   Patient Vulnerabilities substance abuse/addiction;limited support system;limited social skills;traumatic event;history of unsuccessful treatment;poor physical health;lacks insight into illness;poor impulse control   Interprofessional Rounds   Participants ;nursing;occupational therapy;psychiatrist   Team Discussion   Participants Wilton Morfin MD; Tawny Marcelino RN; Maile Wan CTC; Margret Diaz OT   Progress Patient at baseline   Anticipated length of stay 1-3 weeks   Continued Stay Criteria/Rationale Guardianship and discharge facility not established yet   Medical/Physical Stroke history; weight loss   Plan CTC continues iwth Assisted living and memory care referrals and [facility] assessment interview.   Rationale for change in precautions or plan No change   Anticipated Discharge Disposition assisted living;basic nursing care/long-term care;nursing/skilled nursing care     PRECAUTIONS AND SAFETY    Behavioral Orders   Procedures    Code 1 - Restrict to Unit    Code 2     For abdominal US    Fall precautions    Routine Programming     As clinically indicated    Status 15     Every 15 minutes.       Safety  Safety WDL: WDL  Patient Location: dining room, patient room, own  Observed Behavior: calm, sleeping  Observed Behavior (Comment):  (sleeping,lounge)  Safety Measures: clinical history reviewed, safety rounds completed  Diversional Activity: structured exercise  Suicidality: Status 15  Assault: status 15, private room  Elopement: Distress about legal situation (holds for mental health or criminal)  Elopement: status 15  Sexual: status 15

## 2022-02-28 NOTE — PROGRESS NOTES
"[facility], [place]   Psychiatric Progress Note        Interim History:   The patient's care was discussed with the treatment team during the daily team meeting and/or staff's chart notes were reviewed.  Staff report patient slept 12 hours last night. Has been about the same.     The patient reports that he is \"tired.\" Doesn't feel that he slept well even with the melatonin. Reports good appetite. Better groomed today. Denies SI. Denies any AH.          Medications:       aspirin  81 mg Oral Daily     [Held by provider] atorvastatin  80 mg Oral At Bedtime     busPIRone HCl  30 mg Oral BID     cloZAPine  200 mg Oral At Bedtime     donepezil  10 mg Oral At Bedtime     gabapentin  100 mg Oral TID w/meals     haloperidol  1 mg Oral At Bedtime     levothyroxine  88 mcg Oral Daily     megestrol  20 mg Oral Daily     melatonin  5 mg Oral At Bedtime     memantine  10 mg Oral BID     mirtazapine  15 mg Oral At Bedtime     [Held by provider] polyethylene glycol  17 g Oral BID     salmeterol  1 puff Inhalation BID          Allergies:     Allergies   Allergen Reactions     Ibuprofen      Latex           Labs:     No results found for this or any previous visit (from the past 24 hour(s)).       Psychiatric Examination:     /86 (BP Location: Right arm)   Pulse 106   Temp 97.1  F (36.2  C) (Tympanic)   Resp 15   Ht 1.778 m (5' 10\")   Wt 75.5 kg (166 lb 6.4 oz)   SpO2 98%   BMI 23.88 kg/m    Weight is 166 lbs 6.4 oz  Body mass index is 23.88 kg/m .  Orthostatic Vitals  Report      Most Recent      Standing Orthostatic /71 01/31 0819    Standing Orthostatic Pulse (bpm) 116 01/31 0819            Appearance: awake, alert and adequately groomed  Attitude:  cooperative  Eye Contact:  good  Mood:  \"tired\"  Affect:  intensity is blunted  Speech:  paucity of speech  Psychomotor Behavior:  no evidence of tardive dyskinesia, dystonia, or tics  Thought Process:  goal oriented  Associations:  no " loose associations  Thought Content:  no evidence of suicidal ideation or homicidal ideation and no evidence of psychotic thought.   Insight:  limited  Judgement:  limited  Oriented to:  person only  Attention Span and Concentration:  fair  Recent and Remote Memory:  poor    Clinical Global Impressions  First:  Considering your total clinical experience with this particular patient population, how severe are the patient's symptoms at this time?: 6 (01/27/22 2157)  Compared to the patient's condition at the START of treatment, this patient's condition is: 3 (01/27/22 2157)  Most recent:  Considering your total clinical experience with this particular patient population, how severe are the patient's symptoms at this time?: 6 (01/27/22 2157)  Compared to the patient's condition at the START of treatment, this patient's condition is: 3 (01/27/22 2157)         Precautions:     Behavioral Orders   Procedures     Code 1 - Restrict to Unit     Code 2     For abdominal US     Fall precautions     Routine Programming     As clinically indicated     Status 15     Every 15 minutes.          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  He was hospitalized at [facility] for approximately one year and then transferred to station 3B mental health unit for ECT treatment where he was hospitalized 6/30/21 - 1/13/22 until it was discovered that he was COVID-19 positive. He was subsequently transferred to medicine service until 1/19/22 and then discharged to [facility] COVID-19 unit until medically stable. He  was readmitted to geriatric unit, station 3B, on 1/26/22. He is currently under commitment with Yanez being amended to include Clozaril.   Patient has been on Zyprexa, Haldol, Risperdal that were not effective. He is confused, disoriented and endorses visual hallucinations, however he denied auditory hallucinations. He is oriented to self only. Patient is unable to take care of himself in the community and the plan will be for the patient to have a guardian moving forward. Inpatient psychiatric hospitalization is warranted at this time for safety, stabilization, and possible adjustment in medications.     Target psychiatric symptoms and interventions:  Continue PTA medications, including:     BuSpar 30 mg 2 times daily  Clozaril 200 mg at bedtime  Aricept 10 mg at bedtime  Gabapentin 100 mg 3 times a day  Haldol 1 mg at bedtime  Namenda 10 mg 2 times a day  Remeron 15 mg at bedtime     Continue hydroxyzine 25 mg q4h prn for acute anxiety  Continue risperidone 1 mg q6h prn for severe agitation    Medicine following elevated LFTs. Statin, APAP and Trazodone held.     Continue melatonin 5mg at bedtime.     Guardianship in process. Admission to group home. pending finalization of guardianship.     Disposition Plan   Reason for ongoing admission: is unable to care for self due to severe psychosis or mariusz  Discharge location: senior living  Discharge Medications: not ordered  Follow-up Appointments: not scheduled  Legal Status: full commitment    Entered by: Wilton Morfin on February 28, 2022 at 10:43 AM

## 2022-02-28 NOTE — PLAN OF CARE
Goal Outcome Evaluation:    Plan of Care Reviewed With: patient     Patient came to out of his room for snacks and dinner. His affect is flat. His mood is calm. He has been pleasant and cooperative. He denies SI and SIB. He denies AH, depression and anxiety. He ate 100% of is meal with a large cupcake for snack. His fluid intake is adequate with encouragement. He is disheveled and untidy. He was approached x2 for a shower but again refused. He denies pain. He has been sleeping most of the shift. He refused to attend groups. He is not social with peers and staff unless spoken to. He is medication compliant.

## 2022-02-28 NOTE — PLAN OF CARE
Assessment/Intervention/Current Symptoms and Care Coordination  CTC communicating with [facility]  Meaghan Layne regarding assessment scheduled for 3/1/22 @ 12:30.  She is requesting further information regarding when guardianship may be in place due to the need for signatures on the assessment.  Conversation ongoing, assessment may need to be postponed.  She will be in touch.      Discharge Plan or Goal  To A.L., SNF or memory care    Barriers to Discharge   Placement needs and guardianship needs    Referral Status  Multiple referrals in process.  [facility] has declined patient.    Legal Status  Commitment/[facility]

## 2022-02-28 NOTE — PLAN OF CARE
"Pt presented with a calm cooperative mood and flat affect. Pt denies SI, SIB and HI. Pt endorses AH stating he hears people \"talking and whispering\". Pt is oriented to self only. Pt was med compliant spending most of day isolated to room, coming out for meals, not social with peers. Pt was med compliant with appropriate interactions with staff.Appetite good, ate 50% of breakfast and lunch. Fluids encouraged.    VS reviewed: /86 (BP Location: Right arm)   Pulse 106   Temp 97.1  F (36.2  C) (Tympanic)   Resp 15   Ht 1.778 m (5' 10\")   Wt 75.5 kg (166 lb 6.4 oz)   SpO2 98%   BMI 23.88 kg/m   . Patient denies pain.      Length of stay: 33                "

## 2022-02-28 NOTE — CARE PLAN
02/28/22 1524   Team Discussion   Participants Wilton Morfin MD; Tawny Marcelino RN; Maile Wan CTC; Margret Diaz OT   Progress Patient at baseline   Anticipated length of stay 1-3 weeks   Continued Stay Criteria/Rationale Guardianship and discharge facility not established yet   Medical/Physical Stroke history; weight loss   Plan CTC continues iwth Assisted living and memory care referrals and M[facility]hoices assessment interview.   Rationale for change in precautions or plan No change

## 2022-02-28 NOTE — PLAN OF CARE
Problem: Sleep Disturbance (Psychotic Signs/Symptoms)  Goal: Improved Sleep (Psychotic Signs/Symptoms)  Outcome: Ongoing, Progressing    Patient slept a total of 12 hours without any behavioral concerns noted the whole shift.

## 2022-03-01 PROCEDURE — 250N000013 HC RX MED GY IP 250 OP 250 PS 637: Performed by: PSYCHIATRY & NEUROLOGY

## 2022-03-01 PROCEDURE — 124N000003 HC R&B MH SENIOR/ADOLESCENT

## 2022-03-01 PROCEDURE — 99233 SBSQ HOSP IP/OBS HIGH 50: CPT | Performed by: PSYCHIATRY & NEUROLOGY

## 2022-03-01 RX ADMIN — GABAPENTIN 100 MG: 100 CAPSULE ORAL at 09:02

## 2022-03-01 RX ADMIN — SALMETEROL XINAFOATE 1 PUFF: 50 POWDER, METERED ORAL; RESPIRATORY (INHALATION) at 09:02

## 2022-03-01 RX ADMIN — MEMANTINE 10 MG: 10 TABLET ORAL at 09:02

## 2022-03-01 RX ADMIN — MEMANTINE 10 MG: 10 TABLET ORAL at 20:18

## 2022-03-01 RX ADMIN — BUSPIRONE HYDROCHLORIDE 30 MG: 15 TABLET ORAL at 20:19

## 2022-03-01 RX ADMIN — CLOZAPINE 200 MG: 100 TABLET ORAL at 20:18

## 2022-03-01 RX ADMIN — LEVOTHYROXINE SODIUM 88 MCG: 0.09 TABLET ORAL at 09:02

## 2022-03-01 RX ADMIN — BUSPIRONE HYDROCHLORIDE 30 MG: 15 TABLET ORAL at 09:02

## 2022-03-01 RX ADMIN — GABAPENTIN 100 MG: 100 CAPSULE ORAL at 17:47

## 2022-03-01 RX ADMIN — SALMETEROL XINAFOATE 1 PUFF: 50 POWDER, METERED ORAL; RESPIRATORY (INHALATION) at 20:19

## 2022-03-01 RX ADMIN — Medication 5 MG: at 20:18

## 2022-03-01 RX ADMIN — HALOPERIDOL 1 MG: 1 TABLET ORAL at 20:18

## 2022-03-01 RX ADMIN — GABAPENTIN 100 MG: 100 CAPSULE ORAL at 12:25

## 2022-03-01 RX ADMIN — ASPIRIN 81 MG: 81 TABLET, COATED ORAL at 09:01

## 2022-03-01 RX ADMIN — DONEPEZIL HYDROCHLORIDE 10 MG: 10 TABLET ORAL at 20:18

## 2022-03-01 RX ADMIN — MIRTAZAPINE 15 MG: 15 TABLET, FILM COATED ORAL at 20:18

## 2022-03-01 RX ADMIN — MEGESTROL ACETATE 20 MG: 20 TABLET ORAL at 09:01

## 2022-03-01 ASSESSMENT — ACTIVITIES OF DAILY LIVING (ADL)
HYGIENE/GROOMING: PROMPTS
ORAL_HYGIENE: TOTAL CARE
LAUNDRY: UNABLE TO COMPLETE
ORAL_HYGIENE: PROMPTS
HYGIENE/GROOMING: PROMPTS
DRESS: SCRUBS (BEHAVIORAL HEALTH)
DRESS: SCRUBS (BEHAVIORAL HEALTH);PROMPTS
LAUNDRY: UNABLE TO COMPLETE

## 2022-03-01 NOTE — PROGRESS NOTES
"[facility], [place]   Psychiatric Progress Note        Interim History:   The patient's care was discussed with the treatment team during the daily team meeting and/or staff's chart notes were reviewed.  Staff report patient is about the same. Has [place] interview today.     The patient reports that he is \"all right.\" Slept \"like hell.\" Denies SI. Denies AH. Reports good appetite. When asked if he had eaten breakfast, he said No. When asked if he would like to, he said, \"When?\" I told him now and he came down to [facility] rapidly with steady gait.          Medications:       aspirin  81 mg Oral Daily     [Held by provider] atorvastatin  80 mg Oral At Bedtime     busPIRone HCl  30 mg Oral BID     cloZAPine  200 mg Oral At Bedtime     donepezil  10 mg Oral At Bedtime     gabapentin  100 mg Oral TID w/meals     haloperidol  1 mg Oral At Bedtime     levothyroxine  88 mcg Oral Daily     megestrol  20 mg Oral Daily     melatonin  5 mg Oral At Bedtime     memantine  10 mg Oral BID     mirtazapine  15 mg Oral At Bedtime     [Held by provider] polyethylene glycol  17 g Oral BID     salmeterol  1 puff Inhalation BID          Allergies:     Allergies   Allergen Reactions     Ibuprofen      Latex           Labs:     No results found for this or any previous visit (from the past 24 hour(s)).       Psychiatric Examination:     BP (!) 128/93   Pulse 68   Temp 97  F (36.1  C) (Temporal)   Resp 16   Ht 1.778 m (5' 10\")   Wt 75.1 kg (165 lb 8 oz)   SpO2 98%   BMI 23.75 kg/m    Weight is 165 lbs 8 oz  Body mass index is 23.75 kg/m .  Orthostatic Vitals  Report      Most Recent      Standing Orthostatic /71 01/31 0819    Standing Orthostatic Pulse (bpm) 116 01/31 0819            Appearance: awake, alert and adequately groomed  Attitude:  cooperative  Eye Contact:  good  Mood:  \"all right\"  Affect:  intensity is blunted, full at times  Speech:  paucity of speech  Psychomotor Behavior:  no " evidence of tardive dyskinesia, dystonia, or tics  Thought Process:  goal oriented  Associations:  no loose associations  Thought Content:  no evidence of suicidal ideation or homicidal ideation and no evidence of psychotic thought.   Insight:  limited  Judgement:  limited  Oriented to:  person only  Attention Span and Concentration:  fair  Recent and Remote Memory:  poor    Clinical Global Impressions  First:  Considering your total clinical experience with this particular patient population, how severe are the patient's symptoms at this time?: 6 (01/27/22 2157)  Compared to the patient's condition at the START of treatment, this patient's condition is: 3 (01/27/22 2157)  Most recent:  Considering your total clinical experience with this particular patient population, how severe are the patient's symptoms at this time?: 6 (01/27/22 2157)  Compared to the patient's condition at the START of treatment, this patient's condition is: 3 (01/27/22 2157)         Precautions:     Behavioral Orders   Procedures     Code 1 - Restrict to Unit     Code 2     For abdominal US     Fall precautions     Routine Programming     As clinically indicated     Status 15     Every 15 minutes.          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  He was hospitalized at [facility] for approximately one year and then transferred to [facility] 3B mental health unit for ECT treatment where he was hospitalized 6/30/21 - 1/13/22 until it was discovered that he was COVID-19 positive. He was subsequently transferred to  medicine service until 1/19/22 and then discharged to [place] [address] unit until medically stable. He was readmitted to geriatric unit, station 3B, on 1/26/22. He is currently under commitment with Yanez being amended to include Clozaril.   Patient has been on Zyprexa, Haldol, Risperdal that were not effective. He is confused, disoriented and endorses visual hallucinations, however he denied auditory hallucinations. He is oriented to self only. Patient is unable to take care of himself in the community and the plan will be for the patient to have a guardian moving forward. Inpatient psychiatric hospitalization is warranted at this time for safety, stabilization, and possible adjustment in medications.     Target psychiatric symptoms and interventions:  Continue PTA medications, including:     BuSpar 30 mg 2 times daily  Clozaril 200 mg at bedtime  Aricept 10 mg at bedtime  Gabapentin 100 mg 3 times a day  Haldol 1 mg at bedtime  Namenda 10 mg 2 times a day  Remeron 15 mg at bedtime     Continue hydroxyzine 25 mg q4h prn for acute anxiety  Continue risperidone 1 mg q6h prn for severe agitation    Medicine following elevated LFTs. Statin, APAP and Trazodone held.     Continue melatonin 5mg at bedtime.     Guardianship in process. Admission to group home. pending finalization of guardianship.     Disposition Plan   Reason for ongoing admission: is unable to care for self due to severe psychosis or mariusz  Discharge location: [place]  Discharge Medications: not ordered  Follow-up Appointments: not scheduled  Legal Status: full commitment    Entered by: Wilton Morfin on March 1, 2022 at 12:42 PM

## 2022-03-01 NOTE — PLAN OF CARE
"Goal Outcome Evaluation:    Plan of Care Reviewed With: patient     Pt again spent most of day isolated to room coming out for meals. Pt was calm and cooperative and med compliant. Pt denies SI, SIB. Pt denies depression/anxiety at this time. Pt mood is calm but somewhat irritable with flat/blunt affect.    Pt endorses AH and denies VH. Pt continues to be withdrawn with no socialization with peers, opting to sit by himself at meals. 1500 Pt sitting in lounge by himself watching tv.    VS reviewed: BP (!) 128/93   Pulse 68   Temp 97  F (36.1  C) (Temporal)   Resp 16   Ht 1.778 m (5' 10\")   Wt 75.1 kg (165 lb 8 oz)   SpO2 98%   BMI 23.75 kg/m   . Patient denies pain.    Length of stay: 34  "

## 2022-03-01 NOTE — PLAN OF CARE
Goal Outcome Evaluation:  no change    Plan of Care Reviewed With: patient     Remains isolative to his room, usually only to come out for meals or snacks.  Occasionally will come out with prompting to attend group for a short time. Denies voices tonight but endorses anxiety and depression.  Appetite fair eating 50% of his meal.    P:  continue same plan of care.

## 2022-03-01 NOTE — PLAN OF CARE
Problem: Behavior Regulation Impairment (Psychotic Signs/Symptoms)  Goal: Improved Behavioral Control (Psychotic Signs/Symptoms)  Outcome: Ongoing, Progressing   Goal Outcome Evaluation:             Slept well for 11 hours  No concerns raised this shift and no behavioral issues encountered.  Independent with ADL's

## 2022-03-01 NOTE — PLAN OF CARE
Assessment/Intervention/Current Symtoms and Care Coordination:  Patient reports anxiety.  Isolative to his room - with prompting, can be encouraged to attend group for a short time. Sleep and food intake are adequate.     [place]hoices assessment today @ 12:30pm with Meaghan Layne at [facility] ([email or web address].).  Writer contacted JEANMARIE who was able to assist with preparing for [place] Cho[place] assessment.  During assessment Meaghan asked that H&P and MAR be faxed to her at [phone] and this was completed.      There is a packet of forms labeled  coordinated Services   on [facility] s desk.  Pts usually sign these, but John is not able.  Meaghan is aware and asked when the guardianship will be in place. Karla has an email out to the  on that question but hasn t heard back.        -Made referrals for possible placement    Discharge Plan or Goal:  Pending stabilization & development of a safe discharge plan.  Started referrals for SNF-[facility] has turned the patient down     Barriers to Discharge:  History of aggression and elopement.  Lack of current guardian to make health decisions.       Referral Status:  FACILITY STATUS   Ana Talbot @ [facility]3]  [phone]/fax [phone]   3/1 referral made   [facility]   [phone]/fax [phone] 3/1 referral made   [facility]  Marilyn Truong [phone]  Fax [phone] 3/1 referral made   [facility]  [phone]/ Fax [phone] 3/1 referral made   [facility] [place]   [phone]/ fax [phone] 3/1 referral made   Pawel Bautista  [phone]/Fax [phone] 3/1 referral made   Shruthi Martinez  [phone]/Fax  [phone]  3[facility]1 [facility]  Address: [address], [place] [address]  Phone: [phone]/Fax [phone]    3/1 referral made   Raul Marquez [phone]  Fax: [phone] 3/1 referral made    [facility]  [place]iz. Fax: [phone], Phone: [phone].   3/1 referral made            Legal Status:  Patient is under MI commitment in [facility] with Beau. [id number]

## 2022-03-02 PROCEDURE — 99233 SBSQ HOSP IP/OBS HIGH 50: CPT | Performed by: PSYCHIATRY & NEUROLOGY

## 2022-03-02 PROCEDURE — 250N000013 HC RX MED GY IP 250 OP 250 PS 637: Performed by: PSYCHIATRY & NEUROLOGY

## 2022-03-02 PROCEDURE — 124N000003 HC R&B MH SENIOR/ADOLESCENT

## 2022-03-02 RX ADMIN — GABAPENTIN 100 MG: 100 CAPSULE ORAL at 12:18

## 2022-03-02 RX ADMIN — HALOPERIDOL 1 MG: 1 TABLET ORAL at 20:15

## 2022-03-02 RX ADMIN — MEMANTINE 10 MG: 10 TABLET ORAL at 08:32

## 2022-03-02 RX ADMIN — GABAPENTIN 100 MG: 100 CAPSULE ORAL at 08:32

## 2022-03-02 RX ADMIN — LEVOTHYROXINE SODIUM 88 MCG: 0.09 TABLET ORAL at 08:32

## 2022-03-02 RX ADMIN — SALMETEROL XINAFOATE 1 PUFF: 50 POWDER, METERED ORAL; RESPIRATORY (INHALATION) at 20:15

## 2022-03-02 RX ADMIN — GABAPENTIN 100 MG: 100 CAPSULE ORAL at 17:09

## 2022-03-02 RX ADMIN — CLOZAPINE 200 MG: 100 TABLET ORAL at 20:14

## 2022-03-02 RX ADMIN — MEGESTROL ACETATE 20 MG: 20 TABLET ORAL at 08:32

## 2022-03-02 RX ADMIN — BUSPIRONE HYDROCHLORIDE 30 MG: 15 TABLET ORAL at 20:14

## 2022-03-02 RX ADMIN — SALMETEROL XINAFOATE 1 PUFF: 50 POWDER, METERED ORAL; RESPIRATORY (INHALATION) at 08:32

## 2022-03-02 RX ADMIN — ASPIRIN 81 MG: 81 TABLET, COATED ORAL at 08:32

## 2022-03-02 RX ADMIN — DONEPEZIL HYDROCHLORIDE 10 MG: 10 TABLET ORAL at 20:14

## 2022-03-02 RX ADMIN — BUSPIRONE HYDROCHLORIDE 30 MG: 15 TABLET ORAL at 08:32

## 2022-03-02 RX ADMIN — MEMANTINE 10 MG: 10 TABLET ORAL at 20:15

## 2022-03-02 RX ADMIN — Medication 5 MG: at 20:14

## 2022-03-02 RX ADMIN — MIRTAZAPINE 15 MG: 15 TABLET, FILM COATED ORAL at 20:14

## 2022-03-02 ASSESSMENT — ACTIVITIES OF DAILY LIVING (ADL)
LAUNDRY: UNABLE TO COMPLETE
DRESS: SCRUBS (BEHAVIORAL HEALTH)
ORAL_HYGIENE: PROMPTS
HYGIENE/GROOMING: PROMPTS;INDEPENDENT
ORAL_HYGIENE: PROMPTS;INDEPENDENT
LAUNDRY: UNABLE TO COMPLETE
HYGIENE/GROOMING: PROMPTS
DRESS: SCRUBS (BEHAVIORAL HEALTH);INDEPENDENT

## 2022-03-02 NOTE — PROGRESS NOTES
"[facility], [place]   Psychiatric Progress Note        Interim History:   The patient's care was discussed with the treatment team during the daily team meeting and/or staff's chart notes were reviewed.  Staff report patient is withdrawn. Did come out for breakfast. Rates depression and anxiety both at 5/10. Last BM was 3 days ago but declined intervention.     The patient reports that he is \"all right.\" Sleep was better last night. Reports good appetite and says that he ate breakfast today. Denies SI. Denies AH. More verbal today and talks about moving into his \"two mansions in [place].\"          Medications:       aspirin  81 mg Oral Daily     [Held by provider] atorvastatin  80 mg Oral At Bedtime     busPIRone HCl  30 mg Oral BID     cloZAPine  200 mg Oral At Bedtime     donepezil  10 mg Oral At Bedtime     gabapentin  100 mg Oral TID w/meals     haloperidol  1 mg Oral At Bedtime     levothyroxine  88 mcg Oral Daily     megestrol  20 mg Oral Daily     melatonin  5 mg Oral At Bedtime     memantine  10 mg Oral BID     mirtazapine  15 mg Oral At Bedtime     [Held by provider] polyethylene glycol  17 g Oral BID     salmeterol  1 puff Inhalation BID          Allergies:     Allergies   Allergen Reactions     Ibuprofen      Latex           Labs:     No results found for this or any previous visit (from the past 24 hour(s)).       Psychiatric Examination:     /79 (BP Location: Right arm, Patient Position: Sitting, Cuff Size: Adult Regular)   Pulse 113   Temp 97.9  F (36.6  C) (Temporal)   Resp 16   Ht 1.778 m (5' 10\")   Wt 75.1 kg (165 lb 8 oz)   SpO2 99%   BMI 23.75 kg/m    Weight is 165 lbs 8 oz  Body mass index is 23.75 kg/m .  Orthostatic Vitals  Report      Most Recent      Standing Orthostatic /71 01/31 0819    Standing Orthostatic Pulse (bpm) 116 01/31 0819            Appearance: awake, alert and adequately groomed  Attitude:  cooperative  Eye Contact:  " "good  Mood:  \"all right\"  Affect:  intensity is blunted, full at times  Speech:  paucity of speech, more verbal today  Psychomotor Behavior:  no evidence of tardive dyskinesia, dystonia, or tics  Thought Process:  goal oriented  Associations:  no loose associations  Thought Content:  no evidence of suicidal ideation or homicidal ideation and no evidence of psychotic thought.   Insight:  limited  Judgement:  limited  Oriented to:  person only  Attention Span and Concentration:  fair  Recent and Remote Memory:  poor    Clinical Global Impressions  First:  Considering your total clinical experience with this particular patient population, how severe are the patient's symptoms at this time?: 6 (01/27/22 2157)  Compared to the patient's condition at the START of treatment, this patient's condition is: 3 (01/27/22 2157)  Most recent:  Considering your total clinical experience with this particular patient population, how severe are the patient's symptoms at this time?: 6 (01/27/22 2157)  Compared to the patient's condition at the START of treatment, this patient's condition is: 3 (01/27/22 2157)         Precautions:     Behavioral Orders   Procedures     Code 1 - Restrict to Unit     Code 2     For abdominal US     Fall precautions     Routine Programming     As clinically indicated     Status 15     Every 15 minutes.          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  He was hospitalized at [facility] for approximately one year and then transferred to station 3B mental health unit for ECT " treatment where he was hospitalized 6/30/21 - 1/13/22 until it was discovered that he was COVID-19 positive. He was subsequently transferred to medicine service until 1/19/22 and then discharged to [facility] unit until medically stable. He was readmitted to geriatric unit, station 3B, on 1/26/22. He is currently under commitment with Yanez being amended to include Clozaril.   Patient has been on Zyprexa, Haldol, Risperdal that were not effective. He is confused, disoriented and endorses visual hallucinations, however he denied auditory hallucinations. He is oriented to self only. Patient is unable to take care of himself in the community and the plan will be for the patient to have a guardian moving forward. Inpatient psychiatric hospitalization is warranted at this time for safety, stabilization, and possible adjustment in medications.     Target psychiatric symptoms and interventions:  Continue PTA medications, including:     BuSpar 30 mg 2 times daily  Clozaril 200 mg at bedtime  Aricept 10 mg at bedtime  Gabapentin 100 mg 3 times a day  Haldol 1 mg at bedtime  Namenda 10 mg 2 times a day  Remeron 15 mg at bedtime     Continue hydroxyzine 25 mg q4h prn for acute anxiety  Continue risperidone 1 mg q6h prn for severe agitation    Medicine following elevated LFTs. Statin, APAP and Trazodone held.     Continue melatonin 5mg at bedtime.     Guardianship in process. Admission to group home. pending finalization of guardianship.     Disposition Plan   Reason for ongoing admission: is unable to care for self due to severe psychosis or mariusz  Discharge location: senior care  Discharge Medications: not ordered  Follow-up Appointments: not scheduled  Legal Status: full commitment    Entered by: Wilton Morfin on March 2, 2022 at 10:54 AM

## 2022-03-02 NOTE — PLAN OF CARE
Assessment/Intervention/Current Symtoms and Care Coordination  -Team meeting  -Following up on current referrals     Discharge Plan or Goal  Considerations include skilled nursing facility-referral status below     Barriers to Discharge   Placement, sx stabilization, medication mgmt     Referral Status  FACILITY STATUS   [facility]  [phone]/fax [phone]  Can also send by inbasket    3/1 referral made-sent inbasket-DECLINED due to complex mental health   Toy Branham   [phone]/fax [phone] 3/1 referral made; 3/2 LM VM for [facility] [phone]  [facility]  Fax [phone] 3/1 referral made; 3/2 LM VM for [facility]  [phone]/ Fax [phone] 3/1 referral made; 3/2 LM VM for [facility] in [place]   [phone]/ fax [phone] 3/1 referral made-DECLINED   [place], Akila  [phone] or [phone]/Fax [phone] 3/1 referral made; DECLINED   [place], Shruthi  [phone]/Fax  [phone]  3/1 referral made-DECLINED   [facility], Roc[place] Hu  Phone: [phone]/Fax [phone]     3/1 referral made; DECLINED-elopement risk   Raul Marquez [phone]  Fax: [phone] 3/1 referral made; DECLINED-criminal hx on BCA, no guardian and polysub abuse   [facility]  [place]. Fax: [phone], Phone: [phone].    3/1 referral made; 3/2 NO MALE BEDS AT THIS TIME           Legal Status  Committed w/Yanez

## 2022-03-02 NOTE — PLAN OF CARE
"Goal Outcome Evaluation:  No change    Pt is isolative and withdrawn.  Pt came to the lounge for dinner, ate 75%.  When asked about depression, pt states he feels \"ripped off - my 6 month commitment is done and they are still not letting me leave.\"  Pt denies AH/VH/SI/SIB. Ambulation is slow and steady.  Pt declined to shower.  Appetite is good.  Drinking fluids with encouragement.  Pt compliant with medications.                "

## 2022-03-02 NOTE — PLAN OF CARE
Problem: Behavioral Health Plan of Care  Goal: Adheres to Safety Considerations for Self and Others  Outcome: Ongoing, Progressing    Problem: Decreased Participation and Engagement (Psychotic Signs/Symptoms)  Goal: Increased Participation and Engagement (Psychotic Signs/Symptoms)  Outcome: Ongoing, Not Progressing     Pt was calm and cooperative with nursing during this shift. Pt ate his meals on time. Pt denies SI/SIB/any hallucinations. Denies pain or physical discomfort. Pt reported having anxiety and depression level of 5/10 today. No PRN requested or given during this shift. Pt stated he does not like to participate in groups. Pt is withdrawn and isolative to his room most of the time. This writer encouraged the patient to drink more fluids. Denies dizziness, lightheadedness, abdominal pain, N/V or anorexia. Pt is on fall precaution. Pt last BM was 3 days ago but declined PRN bowel med when offered by this writer. VSS except for pulse 113. Pt has blunted flat affects. Pt did not participate in any of the groups today.     Goal Outcome Evaluation:    Plan of Care Reviewed With: patient

## 2022-03-02 NOTE — PLAN OF CARE
Problem: Behavior Regulation Impairment (Psychotic Signs/Symptoms)  Goal: Improved Behavioral Control (Psychotic Signs/Symptoms)  Outcome: Ongoing, Progressing   Goal Outcome Evaluation:           Slept all night for 10 hours  No concerns raised this shift  Independent with ADL's

## 2022-03-03 PROCEDURE — 250N000013 HC RX MED GY IP 250 OP 250 PS 637: Performed by: PSYCHIATRY & NEUROLOGY

## 2022-03-03 PROCEDURE — 99233 SBSQ HOSP IP/OBS HIGH 50: CPT | Performed by: PSYCHIATRY & NEUROLOGY

## 2022-03-03 PROCEDURE — 124N000003 HC R&B MH SENIOR/ADOLESCENT

## 2022-03-03 RX ADMIN — MEMANTINE 10 MG: 10 TABLET ORAL at 08:04

## 2022-03-03 RX ADMIN — GABAPENTIN 100 MG: 100 CAPSULE ORAL at 12:30

## 2022-03-03 RX ADMIN — CLOZAPINE 200 MG: 100 TABLET ORAL at 20:27

## 2022-03-03 RX ADMIN — SALMETEROL XINAFOATE 1 PUFF: 50 POWDER, METERED ORAL; RESPIRATORY (INHALATION) at 20:29

## 2022-03-03 RX ADMIN — HALOPERIDOL 1 MG: 1 TABLET ORAL at 20:27

## 2022-03-03 RX ADMIN — LEVOTHYROXINE SODIUM 88 MCG: 0.09 TABLET ORAL at 08:04

## 2022-03-03 RX ADMIN — MEGESTROL ACETATE 20 MG: 20 TABLET ORAL at 08:04

## 2022-03-03 RX ADMIN — ASPIRIN 81 MG: 81 TABLET, COATED ORAL at 08:04

## 2022-03-03 RX ADMIN — GABAPENTIN 100 MG: 100 CAPSULE ORAL at 08:04

## 2022-03-03 RX ADMIN — GABAPENTIN 100 MG: 100 CAPSULE ORAL at 17:30

## 2022-03-03 RX ADMIN — MEMANTINE 10 MG: 10 TABLET ORAL at 20:27

## 2022-03-03 RX ADMIN — Medication 5 MG: at 20:27

## 2022-03-03 RX ADMIN — BUSPIRONE HYDROCHLORIDE 30 MG: 15 TABLET ORAL at 08:04

## 2022-03-03 RX ADMIN — DONEPEZIL HYDROCHLORIDE 10 MG: 10 TABLET ORAL at 20:27

## 2022-03-03 RX ADMIN — BUSPIRONE HYDROCHLORIDE 30 MG: 15 TABLET ORAL at 20:27

## 2022-03-03 RX ADMIN — SALMETEROL XINAFOATE 1 PUFF: 50 POWDER, METERED ORAL; RESPIRATORY (INHALATION) at 08:04

## 2022-03-03 RX ADMIN — MIRTAZAPINE 15 MG: 15 TABLET, FILM COATED ORAL at 20:27

## 2022-03-03 ASSESSMENT — ACTIVITIES OF DAILY LIVING (ADL)
HYGIENE/GROOMING: INDEPENDENT;PROMPTS
ORAL_HYGIENE: INDEPENDENT;PROMPTS
ORAL_HYGIENE: INDEPENDENT;PROMPTS
DRESS: SCRUBS (BEHAVIORAL HEALTH);INDEPENDENT
HYGIENE/GROOMING: INDEPENDENT;PROMPTS
LAUNDRY: UNABLE TO COMPLETE
LAUNDRY: UNABLE TO COMPLETE
DRESS: SCRUBS (BEHAVIORAL HEALTH);INDEPENDENT

## 2022-03-03 NOTE — PLAN OF CARE
Problem: Behavior Regulation Impairment (Psychotic Signs/Symptoms)  Goal: Improved Behavioral Control (Psychotic Signs/Symptoms)  Outcome: Ongoing, Progressing   Goal Outcome Evaluation:    Plan of Care Reviewed With: patient     Patient remains isolative and withdrawn. He ate his breakfast then went back to his room after taking his medications. He declined to attend groups. He said he feels tired. He doesn't have a goal for today. He says that he will take a shower this afternoon. Patient denies SI/SIB/HIB. He denies any form of hallucinations today although in some days he reported auditory hallucinations but not frequent. Writer WERO patient's thought content because he only offers very minimal verbal responses to questions asked during the 1:1 conversation. Patient doesn't have good eye contact. Patient is consistently adhering to the medical regimen like taking his medications. He is alert and oriented to self only. His appetite is good.

## 2022-03-03 NOTE — PLAN OF CARE
Assessment/Intervention/Current Symptoms and Care Coordination:  Attend Team  Review Chart  Contacted all previous referrals:  [facility]  Marilyn or Dionne [phone]  [facility]  Fax [phone] 3/1 referral made; 3/2 LM VM for [facility]  [phone]/ Fax [phone] 3/1 referral made; 3/2 LM VM for [facility] in [place]   [phone]/ fax [phone] 3/1 referral made-DECLINED   [place], [place]  [phone] or [phone]/Fax [phone] 3/1 referral made; DECLINED   [place], Shruthi  [phone]/Fax  [phone]  3/1 referral made-DECLINED   [facility], Roc[place]le Hu  Phone: [phone]/Fax [phone]     3/1 referral made; DECLINED-elopement risk   Raul Marquez [phone]  Fax: [phone] 3/1 referral made; DECLINED-criminal hx on BCA, no guardian and polysub abuse   [facility]  [place]. Fax: [phone], Phone: [phone].    3/1 referral made; 3/2 NO MALE BEDS AT THIS TIME     [place] Declined.  LVM for [facility] Gallo, and Cherie.    New Referrals:  [place]- FAX- [phone], P- [phone]  [place] F- [phone], P- [phone]  [facility], F- [phone], P [phone]  [facility] F- [phone], P- [phone]  [place]- [phone], P [phone]            Discharge Plan or Goal:  Discharge to long term care facility       Barriers to Discharge:  Pt lacks capacity to consent, guardianship is in the court process. His MNSure [employer]sesmsnet is delayed because of his lack of capacity to sign.            Referral Status:     Numerous declined, new added today.    Legal Status:       Committed with Yanez.

## 2022-03-03 NOTE — PLAN OF CARE
"Goal Outcome Evaluation:    Patient has been isolative and withdrawn. He came to the dining room for dinner and snacks only. His affect is flat. His mood is calm. He is alert to self only. He denies SI and SIB. He endorses AH. He states the voices are telling him \" You're going to be ok\". He denies depression and anxiety. He denies pain. He did not attend groups. He refused to take a shower. He is not social with peers and staff. He ate 75% of his meal. His sleep is good. He is medication compliant.                       "

## 2022-03-03 NOTE — PROGRESS NOTES
"[facility], [place]   Psychiatric Progress Note        Interim History:   The patient's care was discussed with the treatment team during the daily team meeting and/or staff's chart notes were reviewed.  Staff report patient slept 11.25 hours. Has a good appetite.     The patient reports that he is \"all right.\" Reports good sleep but is tired today. Says that he is not hungry this morning so hasn't had breakfast yet. Denies SI. Denies AH. Says that he will \"maybe\" shower this afternoon.          Medications:       aspirin  81 mg Oral Daily     [Held by provider] atorvastatin  80 mg Oral At Bedtime     busPIRone HCl  30 mg Oral BID     cloZAPine  200 mg Oral At Bedtime     donepezil  10 mg Oral At Bedtime     gabapentin  100 mg Oral TID w/meals     haloperidol  1 mg Oral At Bedtime     levothyroxine  88 mcg Oral Daily     megestrol  20 mg Oral Daily     melatonin  5 mg Oral At Bedtime     memantine  10 mg Oral BID     mirtazapine  15 mg Oral At Bedtime     [Held by provider] polyethylene glycol  17 g Oral BID     salmeterol  1 puff Inhalation BID          Allergies:     Allergies   Allergen Reactions     Ibuprofen      Latex           Labs:     No results found for this or any previous visit (from the past 24 hour(s)).       Psychiatric Examination:     /75   Pulse 118   Temp 97  F (36.1  C) (Tympanic)   Resp 16   Ht 1.778 m (5' 10\")   Wt 74.6 kg (164 lb 8 oz)   SpO2 100%   BMI 23.60 kg/m    Weight is 164 lbs 8 oz  Body mass index is 23.6 kg/m .  Orthostatic Vitals  Report      Most Recent      Standing Orthostatic /71 01/31 0819    Standing Orthostatic Pulse (bpm) 116 01/31 0819            Appearance: awake, alert and adequately groomed  Attitude:  cooperative  Eye Contact:  good  Mood:  \"all right\"  Affect:  intensity is blunted, full at times  Speech:  paucity of speech  Psychomotor Behavior:  no evidence of tardive dyskinesia, dystonia, or tics  Thought Process:  " goal oriented  Associations:  no loose associations  Thought Content:  no evidence of suicidal ideation or homicidal ideation and no evidence of psychotic thought.   Insight:  limited  Judgement:  limited  Oriented to:  person only  Attention Span and Concentration:  fair  Recent and Remote Memory:  poor    Clinical Global Impressions  First:  Considering your total clinical experience with this particular patient population, how severe are the patient's symptoms at this time?: 6 (01/27/22 2157)  Compared to the patient's condition at the START of treatment, this patient's condition is: 3 (01/27/22 2157)  Most recent:  Considering your total clinical experience with this particular patient population, how severe are the patient's symptoms at this time?: 6 (01/27/22 2157)  Compared to the patient's condition at the START of treatment, this patient's condition is: 3 (01/27/22 2157)         Precautions:     Behavioral Orders   Procedures     Code 1 - Restrict to Unit     Code 2     For abdominal US     Fall precautions     Routine Programming     As clinically indicated     Status 15     Every 15 minutes.          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  He was hospitalized at [facility] for approximately one year and then transferred to [facility] 3B mental health unit for ECT treatment where he was hospitalized 6/30/21 - 1/13/22 until it was discovered that he was COVID-19 positive. He was subsequently transferred to medicine service until 1/19/22 and then discharged to [place] [address]  unit until medically stable. He was readmitted to geriatric unit, station 3B, on 1/26/22. He is currently under commitment with Yanez being amended to include Clozaril.   Patient has been on Zyprexa, Haldol, Risperdal that were not effective. He is confused, disoriented and endorses visual hallucinations, however he denied auditory hallucinations. He is oriented to self only. Patient is unable to take care of himself in the community and the plan will be for the patient to have a guardian moving forward. Inpatient psychiatric hospitalization is warranted at this time for safety, stabilization, and possible adjustment in medications.     Target psychiatric symptoms and interventions:  Continue PTA medications, including:     BuSpar 30 mg 2 times daily  Clozaril 200 mg at bedtime  Aricept 10 mg at bedtime  Gabapentin 100 mg 3 times a day  Haldol 1 mg at bedtime  Namenda 10 mg 2 times a day  Remeron 15 mg at bedtime     Continue hydroxyzine 25 mg q4h prn for acute anxiety  Continue risperidone 1 mg q6h prn for severe agitation    Medicine following elevated LFTs. Statin, APAP and Trazodone held.     Continue melatonin 5mg at bedtime.     Guardianship in process. Admission to group home. pending finalization of guardianship.     Disposition Plan   Reason for ongoing admission: is unable to care for self due to severe psychosis or mariusz  [facility]  Discharge Medications: not ordered  Follow-up Appointments: not scheduled  Legal Status: full commitment    Entered by: Wilton Morfin on March 3, 2022 at 10:49 AM

## 2022-03-03 NOTE — PLAN OF CARE
Problem: Psychomotor Impairment (Psychotic Signs/Symptoms)  Goal: Improved Psychomotor Symptoms (Psychotic Signs/Symptoms)  Outcome: Ongoing, Progressing   Goal Outcome Evaluation:                    Slept all night for 11.25 hours  Independent with ADL's  No complaints made

## 2022-03-03 NOTE — PROGRESS NOTES
PATIENT DEMOGRAPHICS:   Name: John Juárez MRN: [id number]   Address: [address] [place] [address] Sex: Male   City/St/Zip: [place] [address] : 1965 (56 yrs)   Home Phone: [phone] Work Phone:     County: [place] Cell Phone: [phone]     Needed: No [2] Language English [1]   Ethnicity: American [13] Race: White [1]   Country of Origin: [place] [1] Nondenominational: Zoroastrian [3]      EMERGENCY CONTACT:  Contact Name: Freida Roberts Relationship: Sister   Home Phone: [phone] Work Phone:         Cell Phone:        GUARANTOR INFORMATION: Relationship to Patient: Self  Name: John Juárez       Address: [address] [place] [address]  Account Type: Behavioral   City/St/[place] [address] Employer:     Home Phone: [phone] Work Phone:          COVERAGE INFORMATION:  Primary Payor: [employer] Plan: [employer] S*   Subscriber: John Juárez Group #: [id number]   Subscriber Sex: Male       Subscriber : 1965 Patient Rel'ship: Self   Subscriber Effective Date:   Member Effective Date: 2019    Subscriber ID [id number] Member ID: [id number]      Secondary Payor:   Plan:     Subscriber:   Group #:     Subscriber Sex:         Subscriber :   Patient Rel'ship:     Subscriber Effective Date:   Member Effective Date:     Subscriber ID   Member ID:        PROVIDER INFORMATION:  Referring Physician:   Referring Address:     Referring Phone: N/A Referring Fax:     Primary Physician: Sushil Molina Primary Address: [address] [address]   Primary Phone: [phone] Primary Fax: [phone]

## 2022-03-04 LAB
BASOPHILS # BLD AUTO: 0.1 10E3/UL (ref 0–0.2)
BASOPHILS NFR BLD AUTO: 2 %
EOSINOPHIL # BLD AUTO: 1.8 10E3/UL (ref 0–0.7)
EOSINOPHIL NFR BLD AUTO: 30 %
ERYTHROCYTE [DISTWIDTH] IN BLOOD BY AUTOMATED COUNT: 15.1 % (ref 10–15)
HCT VFR BLD AUTO: 43.3 % (ref 40–53)
HGB BLD-MCNC: 14.8 G/DL (ref 13.3–17.7)
IMM GRANULOCYTES # BLD: 0 10E3/UL
IMM GRANULOCYTES NFR BLD: 1 %
LYMPHOCYTES # BLD AUTO: 1.6 10E3/UL (ref 0.8–5.3)
LYMPHOCYTES NFR BLD AUTO: 27 %
MCH RBC QN AUTO: 31.2 PG (ref 26.5–33)
MCHC RBC AUTO-ENTMCNC: 34.2 G/DL (ref 31.5–36.5)
MCV RBC AUTO: 91 FL (ref 78–100)
MONOCYTES # BLD AUTO: 0.5 10E3/UL (ref 0–1.3)
MONOCYTES NFR BLD AUTO: 9 %
NEUTROPHILS # BLD AUTO: 1.8 10E3/UL (ref 1.6–8.3)
NEUTROPHILS NFR BLD AUTO: 31 %
NRBC # BLD AUTO: 0 10E3/UL
NRBC BLD AUTO-RTO: 0 /100
PLATELET # BLD AUTO: 185 10E3/UL (ref 150–450)
RBC # BLD AUTO: 4.74 10E6/UL (ref 4.4–5.9)
WBC # BLD AUTO: 5.7 10E3/UL (ref 4–11)

## 2022-03-04 PROCEDURE — 250N000013 HC RX MED GY IP 250 OP 250 PS 637: Performed by: PSYCHIATRY & NEUROLOGY

## 2022-03-04 PROCEDURE — 85025 COMPLETE CBC W/AUTO DIFF WBC: CPT | Performed by: PSYCHIATRY & NEUROLOGY

## 2022-03-04 PROCEDURE — 99233 SBSQ HOSP IP/OBS HIGH 50: CPT | Performed by: PSYCHIATRY & NEUROLOGY

## 2022-03-04 PROCEDURE — 36415 COLL VENOUS BLD VENIPUNCTURE: CPT | Performed by: PSYCHIATRY & NEUROLOGY

## 2022-03-04 PROCEDURE — 124N000003 HC R&B MH SENIOR/ADOLESCENT

## 2022-03-04 RX ADMIN — BUSPIRONE HYDROCHLORIDE 30 MG: 15 TABLET ORAL at 08:58

## 2022-03-04 RX ADMIN — LEVOTHYROXINE SODIUM 88 MCG: 0.09 TABLET ORAL at 08:58

## 2022-03-04 RX ADMIN — MEMANTINE 10 MG: 10 TABLET ORAL at 08:58

## 2022-03-04 RX ADMIN — Medication 5 MG: at 20:40

## 2022-03-04 RX ADMIN — GABAPENTIN 100 MG: 100 CAPSULE ORAL at 12:43

## 2022-03-04 RX ADMIN — SALMETEROL XINAFOATE 1 PUFF: 50 POWDER, METERED ORAL; RESPIRATORY (INHALATION) at 08:59

## 2022-03-04 RX ADMIN — CLOZAPINE 200 MG: 100 TABLET ORAL at 20:40

## 2022-03-04 RX ADMIN — GABAPENTIN 100 MG: 100 CAPSULE ORAL at 08:58

## 2022-03-04 RX ADMIN — MIRTAZAPINE 15 MG: 15 TABLET, FILM COATED ORAL at 20:40

## 2022-03-04 RX ADMIN — ASPIRIN 81 MG: 81 TABLET, COATED ORAL at 08:58

## 2022-03-04 RX ADMIN — MEMANTINE 10 MG: 10 TABLET ORAL at 20:40

## 2022-03-04 RX ADMIN — SALMETEROL XINAFOATE 1 PUFF: 50 POWDER, METERED ORAL; RESPIRATORY (INHALATION) at 20:41

## 2022-03-04 RX ADMIN — DONEPEZIL HYDROCHLORIDE 10 MG: 10 TABLET ORAL at 20:40

## 2022-03-04 RX ADMIN — MEGESTROL ACETATE 20 MG: 20 TABLET ORAL at 08:58

## 2022-03-04 RX ADMIN — GABAPENTIN 100 MG: 100 CAPSULE ORAL at 17:28

## 2022-03-04 RX ADMIN — BUSPIRONE HYDROCHLORIDE 30 MG: 15 TABLET ORAL at 20:40

## 2022-03-04 RX ADMIN — HALOPERIDOL 1 MG: 1 TABLET ORAL at 20:40

## 2022-03-04 ASSESSMENT — ACTIVITIES OF DAILY LIVING (ADL)
ORAL_HYGIENE: INDEPENDENT;PROMPTS
ORAL_HYGIENE: INDEPENDENT;PROMPTS
HYGIENE/GROOMING: INDEPENDENT
DRESS: SCRUBS (BEHAVIORAL HEALTH);PROMPTS
DRESS: SCRUBS (BEHAVIORAL HEALTH)
HYGIENE/GROOMING: INDEPENDENT;PROMPTS

## 2022-03-04 NOTE — PROGRESS NOTES
"[facility], [place]   Psychiatric Progress Note        Interim History:   The patient's care was discussed with the treatment team during the daily team meeting and/or staff's chart notes were reviewed.  Staff report patient has been about the same. Is cooperative. Good appetite.     The patient reports that he is \"all right.\" Sleeping and eating well. Denies SI. Denies AH. Agreeable to take a shower after lunch.          Medications:       aspirin  81 mg Oral Daily     [Held by provider] atorvastatin  80 mg Oral At Bedtime     busPIRone HCl  30 mg Oral BID     cloZAPine  200 mg Oral At Bedtime     donepezil  10 mg Oral At Bedtime     gabapentin  100 mg Oral TID w/meals     haloperidol  1 mg Oral At Bedtime     levothyroxine  88 mcg Oral Daily     megestrol  20 mg Oral Daily     melatonin  5 mg Oral At Bedtime     memantine  10 mg Oral BID     mirtazapine  15 mg Oral At Bedtime     [Held by provider] polyethylene glycol  17 g Oral BID     salmeterol  1 puff Inhalation BID          Allergies:     Allergies   Allergen Reactions     Ibuprofen      Latex           Labs:     Recent Results (from the past 24 hour(s))   CBC with platelets and differential    Collection Time: 03/04/22  6:24 AM   Result Value Ref Range    WBC Count 5.7 4.0 - 11.0 10e3/uL    RBC Count 4.74 4.40 - 5.90 10e6/uL    Hemoglobin 14.8 13.3 - 17.7 g/dL    Hematocrit 43.3 40.0 - 53.0 %    MCV 91 78 - 100 fL    MCH 31.2 26.5 - 33.0 pg    MCHC 34.2 31.5 - 36.5 g/dL    RDW 15.1 (H) 10.0 - 15.0 %    Platelet Count 185 150 - 450 10e3/uL    % Neutrophils 31 %    % Lymphocytes 27 %    % Monocytes 9 %    % Eosinophils 30 %    % Basophils 2 %    % Immature Granulocytes 1 %    NRBCs per 100 WBC 0 <1 /100    Absolute Neutrophils 1.8 1.6 - 8.3 10e3/uL    Absolute Lymphocytes 1.6 0.8 - 5.3 10e3/uL    Absolute Monocytes 0.5 0.0 - 1.3 10e3/uL    Absolute Eosinophils 1.8 (H) 0.0 - 0.7 10e3/uL    Absolute Basophils 0.1 0.0 - 0.2 10e3/uL    " "Absolute Immature Granulocytes 0.0 <=0.4 10e3/uL    Absolute NRBCs 0.0 10e3/uL          Psychiatric Examination:     /89 (BP Location: Left arm, Patient Position: Sitting)   Pulse 112   Temp 98.1  F (36.7  C) (Oral)   Resp 18   Ht 1.778 m (5' 10\")   Wt 74.6 kg (164 lb 8 oz)   SpO2 99%   BMI 23.60 kg/m    Weight is 164 lbs 8 oz  Body mass index is 23.6 kg/m .  Orthostatic Vitals  Report      Most Recent      Standing Orthostatic /71 01/31 0819    Standing Orthostatic Pulse (bpm) 116 01/31 0819            Appearance: awake, alert and adequately groomed  Attitude:  cooperative  Eye Contact:  good  Mood:  \"all right\"  Affect:  intensity is blunted, full at times  Speech:  paucity of speech  Psychomotor Behavior:  no evidence of tardive dyskinesia, dystonia, or tics  Thought Process:  goal oriented  Associations:  no loose associations  Thought Content:  no evidence of suicidal ideation or homicidal ideation and no evidence of psychotic thought.   Insight:  limited  Judgement:  limited  Oriented to:  person only  Attention Span and Concentration:  fair  Recent and Remote Memory:  poor    Clinical Global Impressions  First:  Considering your total clinical experience with this particular patient population, how severe are the patient's symptoms at this time?: 6 (01/27/22 2157)  Compared to the patient's condition at the START of treatment, this patient's condition is: 3 (01/27/22 2157)  Most recent:  Considering your total clinical experience with this particular patient population, how severe are the patient's symptoms at this time?: 6 (01/27/22 2157)  Compared to the patient's condition at the START of treatment, this patient's condition is: 3 (01/27/22 2157)         Precautions:     Behavioral Orders   Procedures     Code 1 - Restrict to Unit     Code 2     For abdominal US     Fall precautions     Routine Programming     As clinically indicated     Status 15     Every 15 minutes.          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  He was hospitalized at [facility] for approximately one year and then transferred to station 3B mental health unit for ECT treatment where he was hospitalized 6/30/21 - 1/13/22 until it was discovered that he was COVID-19 positive. He was subsequently transferred to medicine service until 1/19/22 and then discharged to [facility] COVID-19 unit until medically stable. He was readmitted to geriatric unit, station 3B, on 1/26/22. He is currently under commitment with Yanez being amended to include Clozaril.   Patient has been on Zyprexa, Haldol, Risperdal that were not effective. He is confused, disoriented and endorses visual hallucinations, however he denied auditory hallucinations. He is oriented to self only. Patient is unable to take care of himself in the community and the plan will be for the patient to have a guardian moving forward. Inpatient psychiatric hospitalization is warranted at this time for safety, stabilization, and possible adjustment in medications.     Target psychiatric symptoms and interventions:  Continue PTA medications, including:     BuSpar 30 mg 2 times daily  Clozaril 200 mg at bedtime  Aricept 10 mg at bedtime  Gabapentin 100 mg 3 times a day  Haldol 1 mg at bedtime  Namenda 10 mg 2 times a day  Remeron 15 mg at bedtime     Continue hydroxyzine 25 mg q4h prn for acute anxiety  Continue risperidone 1 mg q6h prn for severe agitation    Medicine following elevated LFTs. Statin, APAP and Trazodone held.     Continue melatonin 5mg  at bedtime.     Guardianship in process. Admission to group home. pending finalization of guardianship.     Disposition Plan   Reason for ongoing admission: is unable to care for self due to severe psychosis or mariusz  Discharge location: senior living  Discharge Medications: not ordered  Follow-up Appointments: not scheduled  Legal Status: full commitment    Entered by: Wilton Morfin on March 4, 2022 at 2:16 PM

## 2022-03-04 NOTE — PLAN OF CARE
Problem: Behavior Regulation Impairment (Psychotic Signs/Symptoms)  Goal: Improved Behavioral Control (Psychotic Signs/Symptoms)  Outcome: Ongoing, Progressing   Goal Outcome Evaluation:      Slept well for 10.5 hours  Independent with ADL's  No concerns raised this shift

## 2022-03-04 NOTE — PLAN OF CARE
"  Problem: Behavior Regulation Impairment (Psychotic Signs/Symptoms)  Goal: Improved Behavioral Control (Psychotic SigNursing Assessment    Psychosis (H) [F29]    Admit Date: 1/26/2022    Length of Stay: 37    Patient evaluation continues. Assessed mood,anxiety,thoughts and behavior. Patient is progressing towards goals. Patient is encouraged to participate in groups and assisted to develop healthy coping skills.  Patient denies auditory or visual hallucinations. /89 (BP Location: Left arm, Patient Position: Sitting)   Pulse 112   Temp 98.1  F (36.7  C) (Oral)   Resp 18   Ht 1.778 m (5' 10\")   Wt 74.6 kg (164 lb 8 oz)   SpO2 99%   BMI 23.60 kg/m      Mood: good today    Patient reports depression none  and reports anxiety none    Affect:flat blunted    Sleep: good    Appetite: good     SI: denies    HI: denies    SIB: denies      Medication Compliance yes    Group participation: none    ADL's: encourage    Fall risk interventions: proper foot wear, orthostatic    Rinku Score Interventions: none    Discharge planning in process    Refer to daily team meeting notes for individualized plan of care. Nursing will continue to assess.    *Scale is 1-10 and 10 is the worst.       ns/Symptoms)  Outcome: Met   Goal Outcome Evaluation:    Plan of Care Reviewed With: patient                 "

## 2022-03-04 NOTE — PLAN OF CARE
Goal Outcome Evaluation:    Plan of Care Reviewed With: patient        Patient stayed in his room majority of the shift. He just came out during dinner time. He ate 100% at dinner. He again declined to take a shower again this evening. He looks unkempt; untidy and disheveled. He is withdrawn and quiet. He denies SI/SIB nor hallucinations today. No racing thoughts reported. He is alert to self only. Provides short and minimal verbal responses. He is med compliant. His gait is slow but steady. No delusional thoughts reported nor observed. His speech is soft; and mumbles. He declined again to attend and participate in group activities programmed by the unit.

## 2022-03-04 NOTE — PLAN OF CARE
Assessment/Intervention/Current Symtoms and Care Coordination:  -Reviewed pt's record to get up to date with his current status and plan  -Rounded with team  - Received information from [facility] indicating the guardianship negotiation is getting some traction.  -Referral status: No change from yesterday.     Discharge Plan or Goal:  Pending stabilization & development of a safe discharge plan.     Barriers to Discharge:  History of aggression and elopement.  Lack of current guardian to make health decisions.       Referral Status:  No change from yesterday. See yesterday's CTC note.

## 2022-03-05 PROCEDURE — 250N000013 HC RX MED GY IP 250 OP 250 PS 637: Performed by: PSYCHIATRY & NEUROLOGY

## 2022-03-05 PROCEDURE — 124N000003 HC R&B MH SENIOR/ADOLESCENT

## 2022-03-05 RX ADMIN — MEMANTINE 10 MG: 10 TABLET ORAL at 08:08

## 2022-03-05 RX ADMIN — ASPIRIN 81 MG: 81 TABLET, COATED ORAL at 08:07

## 2022-03-05 RX ADMIN — BUSPIRONE HYDROCHLORIDE 30 MG: 15 TABLET ORAL at 08:07

## 2022-03-05 RX ADMIN — CLOZAPINE 200 MG: 100 TABLET ORAL at 21:35

## 2022-03-05 RX ADMIN — BUSPIRONE HYDROCHLORIDE 30 MG: 15 TABLET ORAL at 21:35

## 2022-03-05 RX ADMIN — LEVOTHYROXINE SODIUM 88 MCG: 0.09 TABLET ORAL at 08:08

## 2022-03-05 RX ADMIN — SALMETEROL XINAFOATE 1 PUFF: 50 POWDER, METERED ORAL; RESPIRATORY (INHALATION) at 08:09

## 2022-03-05 RX ADMIN — MIRTAZAPINE 15 MG: 15 TABLET, FILM COATED ORAL at 21:35

## 2022-03-05 RX ADMIN — MEMANTINE 10 MG: 10 TABLET ORAL at 21:36

## 2022-03-05 RX ADMIN — Medication 5 MG: at 21:36

## 2022-03-05 RX ADMIN — HALOPERIDOL 1 MG: 1 TABLET ORAL at 21:36

## 2022-03-05 RX ADMIN — GABAPENTIN 100 MG: 100 CAPSULE ORAL at 17:31

## 2022-03-05 RX ADMIN — MEGESTROL ACETATE 20 MG: 20 TABLET ORAL at 08:08

## 2022-03-05 RX ADMIN — GABAPENTIN 100 MG: 100 CAPSULE ORAL at 12:08

## 2022-03-05 RX ADMIN — GABAPENTIN 100 MG: 100 CAPSULE ORAL at 08:08

## 2022-03-05 RX ADMIN — DONEPEZIL HYDROCHLORIDE 10 MG: 10 TABLET ORAL at 21:36

## 2022-03-05 RX ADMIN — SALMETEROL XINAFOATE 1 PUFF: 50 POWDER, METERED ORAL; RESPIRATORY (INHALATION) at 21:40

## 2022-03-05 ASSESSMENT — ACTIVITIES OF DAILY LIVING (ADL)
HYGIENE/GROOMING: PROMPTS;INDEPENDENT
DRESS: PROMPTS;SCRUBS (BEHAVIORAL HEALTH);INDEPENDENT
HYGIENE/GROOMING: INDEPENDENT;PROMPTS
ORAL_HYGIENE: INDEPENDENT;PROMPTS
ORAL_HYGIENE: PROMPTS;INDEPENDENT

## 2022-03-05 NOTE — PLAN OF CARE
"  Problem: Behavior Regulation Impairment (Psychotic Signs/Symptoms)  Goal: Improved Behavioral Control (Psychotic Signs/Symptoms)  Outcome: Met   Goal Outcome Evaluation:    Plan of Care Reviewed With: patient             Nursing Assessment    Psychosis (H) [F29]    Admit Date: 1/26/2022    Length of Stay: 38    Patient evaluation continues. Assessed mood,anxiety,thoughts and behavior. Patient is progressing towards goals. Patient is encouraged to participate in groups and assisted to develop healthy coping skills.  Patient denies auditory or visual hallucinations. BP (!) 131/95 (BP Location: Right arm, Patient Position: Supine)   Pulse 96   Temp 97.4  F (36.3  C) (Temporal)   Resp 18   Ht 1.778 m (5' 10\")   Wt 74.6 kg (164 lb 8 oz)   SpO2 97%   BMI 23.60 kg/m      Mood: ok    Patient reports depression as none and anxiety as none    Affect:blunted flat    Sleep: good    Appetite: good    SI: denies    HI: denies    SIB: denies      Medication Compliance yes    Group participation:none    ADL's: refuses to shower    Fall risk interventions: proper foot wear, orthostatic B/p    Rinku Score Interventions:none    Discharge planning in process    Refer to daily team meeting notes for individualized plan of care. Nursing will continue to assess.    *Scale is 1-10 and 10 is the worst.           "

## 2022-03-05 NOTE — PLAN OF CARE
Goal Outcome Evaluation:    Plan of Care Reviewed With: patient     Pt continues to isolate to room except for meals. Affect flat. Makes eye contact on approach and returns verbal greeting. Calm, cooperative. Med-compliant. Ate approx 75% of dinner. No complaints of pain or side effects. Appears to have impoverished speech and thought content. Gait steady. Hygiene poor. Refuses to shower when offered. Continue with current treatment plan and recommendations. Continue to monitor and reassess symptoms. Monitor response to medications. Monitor progress towards treatment goals. Encourage groups and participation.

## 2022-03-05 NOTE — PLAN OF CARE
Problem: Mood Impairment (Psychotic Signs/Symptoms)  Goal: Improved Mood Symptoms (Psychotic Signs/Symptoms)  Outcome: Ongoing, Progressing   Goal Outcome Evaluation:             Slept well for 11 hours  Independent with ADL's  No behavioral concerns this shift

## 2022-03-06 PROCEDURE — 250N000013 HC RX MED GY IP 250 OP 250 PS 637: Performed by: PSYCHIATRY & NEUROLOGY

## 2022-03-06 PROCEDURE — 124N000003 HC R&B MH SENIOR/ADOLESCENT

## 2022-03-06 RX ADMIN — BUSPIRONE HYDROCHLORIDE 30 MG: 15 TABLET ORAL at 08:52

## 2022-03-06 RX ADMIN — SALMETEROL XINAFOATE 1 PUFF: 50 POWDER, METERED ORAL; RESPIRATORY (INHALATION) at 08:53

## 2022-03-06 RX ADMIN — MEMANTINE 10 MG: 10 TABLET ORAL at 20:19

## 2022-03-06 RX ADMIN — ASPIRIN 81 MG: 81 TABLET, COATED ORAL at 08:52

## 2022-03-06 RX ADMIN — BUSPIRONE HYDROCHLORIDE 30 MG: 15 TABLET ORAL at 20:19

## 2022-03-06 RX ADMIN — DONEPEZIL HYDROCHLORIDE 10 MG: 10 TABLET ORAL at 20:19

## 2022-03-06 RX ADMIN — MEMANTINE 10 MG: 10 TABLET ORAL at 08:52

## 2022-03-06 RX ADMIN — GABAPENTIN 100 MG: 100 CAPSULE ORAL at 12:33

## 2022-03-06 RX ADMIN — GABAPENTIN 100 MG: 100 CAPSULE ORAL at 17:13

## 2022-03-06 RX ADMIN — SALMETEROL XINAFOATE 1 PUFF: 50 POWDER, METERED ORAL; RESPIRATORY (INHALATION) at 20:19

## 2022-03-06 RX ADMIN — HALOPERIDOL 1 MG: 1 TABLET ORAL at 20:19

## 2022-03-06 RX ADMIN — CLOZAPINE 200 MG: 100 TABLET ORAL at 20:19

## 2022-03-06 RX ADMIN — Medication 5 MG: at 20:19

## 2022-03-06 RX ADMIN — LEVOTHYROXINE SODIUM 88 MCG: 0.09 TABLET ORAL at 08:30

## 2022-03-06 RX ADMIN — GABAPENTIN 100 MG: 100 CAPSULE ORAL at 08:52

## 2022-03-06 RX ADMIN — MIRTAZAPINE 15 MG: 15 TABLET, FILM COATED ORAL at 20:19

## 2022-03-06 RX ADMIN — MEGESTROL ACETATE 20 MG: 20 TABLET ORAL at 08:53

## 2022-03-06 ASSESSMENT — ACTIVITIES OF DAILY LIVING (ADL)
ORAL_HYGIENE: INDEPENDENT;PROMPTS
HYGIENE/GROOMING: INDEPENDENT;PROMPTS
DRESS: SCRUBS (BEHAVIORAL HEALTH);INDEPENDENT

## 2022-03-06 NOTE — PLAN OF CARE
Problem: Behavior Regulation Impairment (Psychotic Signs/Symptoms)  Goal: Improved Behavioral Control (Psychotic Signs/Symptoms)  Outcome: Ongoing, Progressing   Goal Outcome Evaluation:          Slept comfortably for 9.5 hours  No concerns raised

## 2022-03-06 NOTE — PLAN OF CARE
"  Problem: Behavior Regulation Impairment (Psychotic Signs/Symptoms)  Goal: Improved Behavioral Control (Psychotic Signs/Symptoms)  3/6/2022 1528 by Wen Broderick, RN  Outcome: Met  3/6/2022 1528 by Wen Broderick, RN  Outcome: Met   Goal Outcome Evaluation:    Plan of Care Reviewed With: patient             Nursing Assessment    Psychosis (H) [F29]    Admit Date: 1/26/2022    Length of Stay: 39    Patient evaluation continues. Assessed mood,anxiety,thoughts and behavior. Patient is  progressing towards goals. Patient is encouraged to participate in groups and assisted to develop healthy coping skills.  Patient denies auditory or visual hallucinations. BP (!) 126/91   Pulse 114   Temp 98  F (36.7  C) (Temporal)   Resp 16   Ht 1.778 m (5' 10\")   Wt 75.1 kg (165 lb 8 oz)   SpO2 100%   BMI 23.75 kg/m      Mood: ok    Patient reports depression none and reports anxiety none    Affect:flat blunted    Sleep: good    Appetite: good    SI: denies    HI: denies    SIB: denies      Medication Compliance yes    Group participation:none    ADL's: washes face and hands but refuses to shower    Fall risk interventions: proper foot wear, orthostatic B/P    Rinku Score Interventions:none    Discharge planning in process    Refer to daily team meeting notes for individualized plan of care. Nursing will continue to assess.    *Scale is 1-10 and 10 is the worst.           "

## 2022-03-06 NOTE — PLAN OF CARE
"Goal Outcome Evaluation:    Plan of Care Reviewed With: patient        Pt continues to be isolative to bed except for meals. Ate approx 75% of dinner. Hygiene remains inadequate; declines offer of assistance with shower. Denies all mental health symptoms. Affect blunted. Speech and thought appear impoverished. Makes fleeing eye contact. Pleasant, calm, cooperative. No overt signs of psychosis. Reports feeling \"tired,\" appears lethargic. Med-compliant. Continue with current treatment plan and recommendations. Continue to monitor and reassess symptoms. Monitor response to medications. Monitor progress towards treatment goals. Encourage groups and participation.          "

## 2022-03-07 PROCEDURE — 250N000013 HC RX MED GY IP 250 OP 250 PS 637: Performed by: PSYCHIATRY & NEUROLOGY

## 2022-03-07 PROCEDURE — 99233 SBSQ HOSP IP/OBS HIGH 50: CPT | Performed by: PSYCHIATRY & NEUROLOGY

## 2022-03-07 PROCEDURE — 124N000003 HC R&B MH SENIOR/ADOLESCENT

## 2022-03-07 RX ADMIN — CLOZAPINE 200 MG: 100 TABLET ORAL at 20:03

## 2022-03-07 RX ADMIN — BUSPIRONE HYDROCHLORIDE 30 MG: 15 TABLET ORAL at 20:03

## 2022-03-07 RX ADMIN — HALOPERIDOL 1 MG: 1 TABLET ORAL at 20:03

## 2022-03-07 RX ADMIN — DONEPEZIL HYDROCHLORIDE 10 MG: 10 TABLET ORAL at 20:04

## 2022-03-07 RX ADMIN — SALMETEROL XINAFOATE 1 PUFF: 50 POWDER, METERED ORAL; RESPIRATORY (INHALATION) at 20:02

## 2022-03-07 RX ADMIN — LEVOTHYROXINE SODIUM 88 MCG: 0.09 TABLET ORAL at 08:37

## 2022-03-07 RX ADMIN — SALMETEROL XINAFOATE 1 PUFF: 50 POWDER, METERED ORAL; RESPIRATORY (INHALATION) at 08:38

## 2022-03-07 RX ADMIN — GABAPENTIN 100 MG: 100 CAPSULE ORAL at 17:33

## 2022-03-07 RX ADMIN — Medication 5 MG: at 20:05

## 2022-03-07 RX ADMIN — BUSPIRONE HYDROCHLORIDE 30 MG: 15 TABLET ORAL at 08:38

## 2022-03-07 RX ADMIN — MIRTAZAPINE 15 MG: 15 TABLET, FILM COATED ORAL at 20:03

## 2022-03-07 RX ADMIN — MEMANTINE 10 MG: 10 TABLET ORAL at 08:38

## 2022-03-07 RX ADMIN — GABAPENTIN 100 MG: 100 CAPSULE ORAL at 08:37

## 2022-03-07 RX ADMIN — MEGESTROL ACETATE 20 MG: 20 TABLET ORAL at 08:37

## 2022-03-07 RX ADMIN — ASPIRIN 81 MG: 81 TABLET, COATED ORAL at 08:37

## 2022-03-07 RX ADMIN — MEMANTINE 10 MG: 10 TABLET ORAL at 20:04

## 2022-03-07 RX ADMIN — GABAPENTIN 100 MG: 100 CAPSULE ORAL at 12:31

## 2022-03-07 ASSESSMENT — ACTIVITIES OF DAILY LIVING (ADL)
DRESS: INDEPENDENT;PROMPTS
HYGIENE/GROOMING: INDEPENDENT;PROMPTS
ORAL_HYGIENE: INDEPENDENT;PROMPTS
HYGIENE/GROOMING: INDEPENDENT;PROMPTS
LAUNDRY: UNABLE TO COMPLETE
DRESS: INDEPENDENT;PROMPTS
ORAL_HYGIENE: INDEPENDENT;PROMPTS

## 2022-03-07 NOTE — PROGRESS NOTES
"[facility], [place]   Psychiatric Progress Note        Interim History:   The patient's care was discussed with the treatment team during the daily team meeting and/or staff's chart notes were reviewed.  Staff report patient has been about the same. Slept 9.5 hours. Has been out in the lounge more.     The patient reports that he is \"all right.\" Does not answer questions about sleep or appetite. Does not answer questions about suicidality or hallucinations. No concerns at this time.          Medications:       aspirin  81 mg Oral Daily     [Held by provider] atorvastatin  80 mg Oral At Bedtime     busPIRone HCl  30 mg Oral BID     cloZAPine  200 mg Oral At Bedtime     donepezil  10 mg Oral At Bedtime     gabapentin  100 mg Oral TID w/meals     haloperidol  1 mg Oral At Bedtime     levothyroxine  88 mcg Oral Daily     megestrol  20 mg Oral Daily     melatonin  5 mg Oral At Bedtime     memantine  10 mg Oral BID     mirtazapine  15 mg Oral At Bedtime     [Held by provider] polyethylene glycol  17 g Oral BID     salmeterol  1 puff Inhalation BID          Allergies:     Allergies   Allergen Reactions     Ibuprofen      Latex           Labs:     No results found for this or any previous visit (from the past 24 hour(s)).       Psychiatric Examination:     /74   Pulse (!) 122   Temp 98.4  F (36.9  C) (Temporal)   Resp 16   Ht 1.778 m (5' 10\")   Wt 75.1 kg (165 lb 8 oz)   SpO2 99%   BMI 23.75 kg/m    Weight is 165 lbs 8 oz  Body mass index is 23.75 kg/m .  Orthostatic Vitals  Report      Most Recent      Standing Orthostatic /71 01/31 0819    Standing Orthostatic Pulse (bpm) 116 01/31 0819            Appearance: awake, alert and adequately groomed  Attitude:  guarded  Eye Contact:  good  Mood:  \"all right\"  Affect:  intensity is blunted  Speech:  paucity of speech, less engaged today  Psychomotor Behavior:  no evidence of tardive dyskinesia, dystonia, or tics  Thought Process:  " goal oriented  Associations:  no loose associations  Thought Content:  no evidence of suicidal ideation or homicidal ideation and no evidence of psychotic thought.   Insight:  limited  Judgement:  limited  Oriented to:  person only  Attention Span and Concentration:  fair  Recent and Remote Memory:  poor    Clinical Global Impressions  First:  Considering your total clinical experience with this particular patient population, how severe are the patient's symptoms at this time?: 6 (01/27/22 2157)  Compared to the patient's condition at the START of treatment, this patient's condition is: 3 (01/27/22 2157)  Most recent:  Considering your total clinical experience with this particular patient population, how severe are the patient's symptoms at this time?: 6 (01/27/22 2157)  Compared to the patient's condition at the START of treatment, this patient's condition is: 3 (01/27/22 2157)         Precautions:     Behavioral Orders   Procedures     Code 1 - Restrict to Unit     Code 2     For abdominal US     Fall precautions     Routine Programming     As clinically indicated     Status 15     Every 15 minutes.          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  He was hospitalized at [facility] for approximately one year and then transferred to [facility] 3B mental health unit for ECT treatment where he was hospitalized 6/30/21 - 1/13/22 until it was discovered that he was COVID-19 positive. He was subsequently transferred to medicine service until 1/19/22 and then discharged to [place] [address]  unit until medically stable. He was readmitted to geriatric unit, station 3B, on 1/26/22. He is currently under commitment with Yanez being amended to include Clozaril.   Patient has been on Zyprexa, Haldol, Risperdal that were not effective. He is confused, disoriented and endorses visual hallucinations, however he denied auditory hallucinations. He is oriented to self only. Patient is unable to take care of himself in the community and the plan will be for the patient to have a guardian moving forward. Inpatient psychiatric hospitalization is warranted at this time for safety, stabilization, and possible adjustment in medications.     Target psychiatric symptoms and interventions:  Continue PTA medications, including:     BuSpar 30 mg 2 times daily  Clozaril 200 mg at bedtime  Aricept 10 mg at bedtime  Gabapentin 100 mg 3 times a day  Haldol 1 mg at bedtime  Namenda 10 mg 2 times a day  Remeron 15 mg at bedtime     Continue hydroxyzine 25 mg q4h prn for acute anxiety  Continue risperidone 1 mg q6h prn for severe agitation    Medicine following elevated LFTs. Statin, APAP and Trazodone held.     Continue melatonin 5mg at bedtime.     Guardianship in process. Admission to group home. pending finalization of guardianship.     Disposition Plan   Reason for ongoing admission: is unable to care for self due to severe psychosis or mariusz  Discharge location: [facility]  Discharge Medications: not ordered  Follow-up Appointments: not scheduled  Legal Status: full commitment    Entered by: Wilton Morfin on March 7, 2022 at 10:52 AM

## 2022-03-07 NOTE — PLAN OF CARE
Assessment/Intervention/Current Symtoms and Care Coordination:  -Reviewed pt's record to get up to date with his current status and plan  -Rounded with team  - Pt is showing improvements in terms of ambulation and eating. Hygine continues to be a challenge.  -Referral status: No change from last week     Discharge Plan or Goal:  Pending stabilization & development of a safe discharge plan.  LSS is working on streamlining guardianship     Barriers to Discharge:  History of aggression and elopement.  Lack of current guardian to make health decisions.   Several referrals made without acceptance      Referral Status:  Last week. See yesterday's CTC note.

## 2022-03-07 NOTE — PLAN OF CARE
Problem: Mood Impairment (Psychotic Signs/Symptoms)  Goal: Improved Mood Symptoms (Psychotic Signs/Symptoms)  Outcome: Ongoing, Progressing   Goal Outcome Evaluation:           Slept uninterrupted for 9.5 hours  No concerns raised tonight.

## 2022-03-07 NOTE — PLAN OF CARE
"  BEHAVIORAL TEAM DISCUSSION     Participants: Aneesh Johnson MD; Ana Gupta; Juarez Murphy RN; Lu Clark, JENNIFER; Joi BULLARD OT; FIFI Rivera, FIFI Ibarra; Wen RODRIGUEZ.       Progress:  The patient reports that he is \"all right.\" Does not answer questions about sleep or appetite. Does not answer questions about suicidality or hallucinations.   Continued Stay Criteria/Rationale: Stabilization is ongoing. Hygiene continues to be problematic. Pt is not showering or brushing his teeth.  Medical/Physical: See medical.           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   Tardive Dyskinesia   Alcohol Use Disorder, in controlled environment   Stimulant Use Disorder, in controlled environment   COPD  HTN  CVA             Plan:   Psychiatric treatment/inteventions:  Medications:     aspirin  81 mg Oral Daily     [Held by provider] atorvastatin  80 mg Oral At Bedtime     busPIRone HCl  30 mg Oral BID     cloZAPine  200 mg Oral At Bedtime     donepezil  10 mg Oral At Bedtime     gabapentin  100 mg Oral TID w/meals     haloperidol  1 mg Oral At Bedtime     levothyroxine  88 mcg Oral Daily     megestrol  20 mg Oral Daily     melatonin  5 mg Oral At Bedtime     memantine  10 mg Oral BID     mirtazapine  15 mg Oral At Bedtime     [Held by provider] polyethylene glycol  17 g Oral BID     salmeterol  1 puff Inhalation BID          Precautions:           Behavioral Orders   Procedures     Code 1 - Restrict to Unit     Code 2       For abdominal US     Fall precautions     Routine Programming       As clinically indicated     Status 15       Every 15 minutes.        Disposition Plan   Reason for ongoing admission: Pt is currently being stabilized.   Discharge location: [facility] is working on guardianship. Referrals have been sent. None has yet accepted the patient.  Discharge Medications: not ordered  Follow-up Appointments: not scheduled  Legal Status: Pt is " fully committed by the court.  Pt will continue to be engaged in the therapeutic milieu. Pt will continue to receive medical/psychiatric interventions including medications, psychotherapy, occupational and recreational therapy, among other services. Coordination of care will continue targeting suitable disposition, when pt attains stability.

## 2022-03-07 NOTE — PLAN OF CARE
"Goal Outcome Evaluation:        Pt continues to appear flat. Appears to have impoverished thought and speech. Fleeting eye contact on approach. Replies in barely audible voice when spoken to, saying \"hi\" or \"thanks\" only. Cooperative, med-compliant. Out to lounge independently for dinner. Gait steady. Continues to refuse shower when offered. No signs or symptoms of psychosis. Denies all mental health symptoms. Continue with current treatment plan and recommendations. Continue to monitor and reassess symptoms. Monitor response to medications. Monitor progress towards treatment goals. Encourage groups and participation.               "

## 2022-03-07 NOTE — PLAN OF CARE
"  Problem: Behavior Regulation Impairment (Psychotic Signs/Symptoms)  Goal: Improved Behavioral Control (Psychotic Signs/Symptoms)  Outcome: Met   Goal Outcome Evaluation:    Plan of Care Reviewed With: patient               Nursing Assessment    Psychosis (H) [F29]    Admit Date: 1/26/2022    Length of Stay: 40    Patient evaluation continues. Assessed mood,anxiety,thoughts and behavior. Patient is progressing towards goals. Patient is encouraged to participate in groups and assisted to develop healthy coping skills.  Patient denies auditory or visual hallucinations. /74   Pulse (!) 122   Temp 98.4  F (36.9  C) (Temporal)   Resp 16   Ht 1.778 m (5' 10\")   Wt 75.1 kg (165 lb 8 oz)   SpO2 99%   BMI 23.75 kg/m      Mood: ok    Patient reports depression none and reports anxiety none    Affect:flat blunted    Sleep: good    Appetite: good    SI: denies    HI: denies    SIB: denies      Medication Compliance yes    Group participation: yes    ADL's: needs prompts and encouragement    Fall risk interventions: proper foot wear, orthostatic B/p    Rinku Score Interventions:none    Discharge planning in process    Refer to daily team meeting notes for individualized plan of care. Nursing will continue to assess.    *Scale is 1-10 and 10 is the worst.         "

## 2022-03-08 PROCEDURE — 99231 SBSQ HOSP IP/OBS SF/LOW 25: CPT

## 2022-03-08 PROCEDURE — 250N000013 HC RX MED GY IP 250 OP 250 PS 637: Performed by: PSYCHIATRY & NEUROLOGY

## 2022-03-08 PROCEDURE — 124N000003 HC R&B MH SENIOR/ADOLESCENT

## 2022-03-08 RX ADMIN — MEMANTINE 10 MG: 10 TABLET ORAL at 20:59

## 2022-03-08 RX ADMIN — GABAPENTIN 100 MG: 100 CAPSULE ORAL at 18:12

## 2022-03-08 RX ADMIN — Medication 5 MG: at 20:58

## 2022-03-08 RX ADMIN — DONEPEZIL HYDROCHLORIDE 10 MG: 10 TABLET ORAL at 21:00

## 2022-03-08 RX ADMIN — GABAPENTIN 100 MG: 100 CAPSULE ORAL at 12:27

## 2022-03-08 RX ADMIN — SALMETEROL XINAFOATE 1 PUFF: 50 POWDER, METERED ORAL; RESPIRATORY (INHALATION) at 21:01

## 2022-03-08 RX ADMIN — CLOZAPINE 200 MG: 100 TABLET ORAL at 21:00

## 2022-03-08 RX ADMIN — GABAPENTIN 100 MG: 100 CAPSULE ORAL at 08:17

## 2022-03-08 RX ADMIN — MIRTAZAPINE 15 MG: 15 TABLET, FILM COATED ORAL at 21:00

## 2022-03-08 RX ADMIN — BUSPIRONE HYDROCHLORIDE 30 MG: 15 TABLET ORAL at 20:59

## 2022-03-08 RX ADMIN — ASPIRIN 81 MG: 81 TABLET, COATED ORAL at 08:17

## 2022-03-08 RX ADMIN — HALOPERIDOL 1 MG: 1 TABLET ORAL at 21:00

## 2022-03-08 RX ADMIN — SALMETEROL XINAFOATE 1 PUFF: 50 POWDER, METERED ORAL; RESPIRATORY (INHALATION) at 08:17

## 2022-03-08 RX ADMIN — LEVOTHYROXINE SODIUM 88 MCG: 0.09 TABLET ORAL at 08:17

## 2022-03-08 RX ADMIN — MEMANTINE 10 MG: 10 TABLET ORAL at 08:17

## 2022-03-08 RX ADMIN — MEGESTROL ACETATE 20 MG: 20 TABLET ORAL at 08:17

## 2022-03-08 RX ADMIN — BUSPIRONE HYDROCHLORIDE 30 MG: 15 TABLET ORAL at 08:17

## 2022-03-08 ASSESSMENT — ACTIVITIES OF DAILY LIVING (ADL)
ORAL_HYGIENE: INDEPENDENT;PROMPTS
LAUNDRY: UNABLE TO COMPLETE
HYGIENE/GROOMING: INDEPENDENT;PROMPTS
HYGIENE/GROOMING: INDEPENDENT;PROMPTS
LAUNDRY: UNABLE TO COMPLETE
DRESS: INDEPENDENT;PROMPTS
ORAL_HYGIENE: INDEPENDENT;PROMPTS
DRESS: PROMPTS;INDEPENDENT

## 2022-03-08 NOTE — PLAN OF CARE
Assessment/Intervention/Current Symtoms and Care Coordination:  -Reviewed pt's record to get up to date with his current status and plan  -Rounded with team  - Pt is showing improvements in terms of ambulation and eating. Pt comes out and eats his food in the milieu. He has been noticed to ask questions related to when he will be discharging from the hospital.  Hygine continues to be a challenge.  -Referral status: No change from yesterday     Discharge Plan or Goal:  Pending stabilization & development of a safe discharge plan.  LSS is working on streamlining guardianship     Barriers to Discharge:  History of aggression and elopement.  Lack of current guardian to make health decisions.   Several referrals made without acceptance      Referral Status:  Last week. See yesterday's CTC note.

## 2022-03-08 NOTE — PLAN OF CARE
"Goal Outcome Evaluation:  Problem: Behavioral Health Plan of Care  Goal: Develops/Participates in Therapeutic [place] to Support Successful Transition  Outcome: Ongoing, Not Progressing           Pt continues to be isolative and withdrawn.  Pt ate dinner in the lounge.  Appetite  is good - ate 100% at dinner.  Pt is calm and cooperative.  No overt signs of psychosis observed.  Pt states his mood is \"OK, but I am tired.\"  Later pt stated, \"I don't think they are ever going to let me out of here.\"  Pt provides very brief response to questions - and sometimes no response.  Pt continues to decline a shower. Compliant with medications.              "

## 2022-03-08 NOTE — PLAN OF CARE
Problem: Psychomotor Impairment (Psychotic Signs/Symptoms)  Goal: Improved Psychomotor Symptoms (Psychotic Signs/Symptoms)  Outcome: Ongoing, Progressing   Goal Outcome Evaluation:           Slept well tonight for 8.5 hours  No concerns raised this shift  Independent in repositioning in bed.

## 2022-03-08 NOTE — PROGRESS NOTES
[facility], [place]   Psychiatric Progress Note  Hospital Day: 41        Interim History:   The patient's care was discussed with the treatment team during the daily team meeting and staff's chart notes were reviewed. Patient seen by MAIA Davila CNP  Staff report patient Slept well tonight for 8.5 hours with no concerns raised during the shift, no changes.    Upon interview, the patient was sleeping in bed. Does not answer any questions, last night stated to staff his frustration about being in the hospital for as long as he has been. No concerns at this time.          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  He was hospitalized at [facility] for approximately one year and then transferred to station 3B mental health unit for ECT treatment where he was hospitalized 6/30/21 - 1/13/22 until it was discovered that he was COVID-19 positive. He was subsequently transferred to medicine service until 1/19/22 and then discharged to [place]'s COVID-19 unit until medically stable. He was readmitted to geriatric unit, station 3B, on 1/26/22. He is currently under commitment with Yanez being amended to include Clozaril.   Patient has been on Zyprexa, Haldol, Risperdal that were not effective. He is confused, disoriented and endorses visual hallucinations, however he denied auditory hallucinations. He is oriented to self only. Patient is unable to take care of himself in the community and the plan will be for the patient to have a guardian moving forward. Inpatient  "psychiatric hospitalization is warranted at this time for safety, stabilization, and possible adjustment in medications.     Target psychiatric symptoms and interventions:  Continue PTA medications, including:     BuSpar 30 mg 2 times daily  Clozaril 200 mg at bedtime  Aricept 10 mg at bedtime  Gabapentin 100 mg 3 times a day  Haldol 1 mg at bedtime  Namenda 10 mg 2 times a day  Remeron 15 mg at bedtime     Continue hydroxyzine 25 mg q4h prn for acute anxiety  Continue risperidone 1 mg q6h prn for severe agitation     Medicine following elevated LFTs. Statin, APAP and Trazodone held.      Continue melatonin 5mg at bedtime.      Guardianship in process. Admission to group home. pending finalization of guardianship.       Disposition Plan   Reason for ongoing admission: is unable to care for self due to severe psychosis or mariusz  Discharge location: [facility]  Discharge Medications: not ordered  Follow-up Appointments: not scheduled  Legal Status: full commitment         Medications:       aspirin  81 mg Oral Daily     [Held by provider] atorvastatin  80 mg Oral At Bedtime     busPIRone HCl  30 mg Oral BID     cloZAPine  200 mg Oral At Bedtime     donepezil  10 mg Oral At Bedtime     gabapentin  100 mg Oral TID w/meals     haloperidol  1 mg Oral At Bedtime     levothyroxine  88 mcg Oral Daily     megestrol  20 mg Oral Daily     melatonin  5 mg Oral At Bedtime     memantine  10 mg Oral BID     mirtazapine  15 mg Oral At Bedtime     [Held by provider] polyethylene glycol  17 g Oral BID     salmeterol  1 puff Inhalation BID            Psychiatric Examination:     /88   Pulse 72   Temp 97.1  F (36.2  C) (Temporal)   Resp 16   Ht 1.778 m (5' 10\")   Wt 75.3 kg (165 lb 14.4 oz)   SpO2 99%   BMI 23.80 kg/m    Weight is 165 lbs 14.4 oz  Body mass index is 23.8 kg/m .  Orthostatic Vitals  Report      Most Recent      Standing Orthostatic /77 03/08 0842    Standing Orthostatic Pulse (bpm) 128 03/08 0842    "         Appearance: sleeping  Attitude:  uncooperative  Eye Contact:  poor   Mood:  discouraged  Affect:  intensity is blunted  Speech:  mute  Psychomotor Behavior:  no evidence of tardive dyskinesia, dystonia, or tics  Thought Process:  unable to assess  Associations:  unable to assess  Thought Content:  no evidence of suicidal ideation or homicidal ideation and no evidence of psychotic thought  Insight:  limited  Judgement:  limited  Oriented to:  unable to assess  Attention Span and Concentration:  fair  Recent and Remote Memory:  poor           Allergies:     Allergies   Allergen Reactions     Ibuprofen      Latex           Labs:   No results found for this or any previous visit (from the past 24 hour(s)).         Precautions:     Behavioral Orders   Procedures     Code 1 - Restrict to Unit     Code 2     For abdominal US     Fall precautions     Routine Programming     As clinically indicated     Status 15     Every 15 minutes.       Entered by: Barbara Albrecht on March 8, 2022 at 10:02 AM

## 2022-03-08 NOTE — PLAN OF CARE
Problem: Behavior Regulation Impairment (Psychotic Signs/Symptoms)  Goal: Improved Behavioral Control (Psychotic Signs/Symptoms)  Outcome: Ongoing, Progressing     Problem: Decreased Participation and Engagement (Psychotic Signs/Symptoms)  Goal: Increased Participation and Engagement (Psychotic Signs/Symptoms)  Outcome: Ongoing, Progressing   Goal Outcome Evaluation:    Plan of Care Reviewed With: patient       Patient is calm cooperative, medication compliant. Denies SI, SIB and depression. Endorses AH, when asked who's voice or what they are saying, patient mumbled something incoherently. Writer did not observe patient responding to internal stimuli. Patient also endorses anxiety relating to still being here, asking the writer when am I getting out of here. Patient spent time in the milieu for meals, good appetite ate 75%. Did not attend groups. No prns given.

## 2022-03-09 PROCEDURE — 124N000003 HC R&B MH SENIOR/ADOLESCENT

## 2022-03-09 PROCEDURE — 99231 SBSQ HOSP IP/OBS SF/LOW 25: CPT

## 2022-03-09 PROCEDURE — 250N000013 HC RX MED GY IP 250 OP 250 PS 637: Performed by: PSYCHIATRY & NEUROLOGY

## 2022-03-09 RX ADMIN — LEVOTHYROXINE SODIUM 88 MCG: 0.09 TABLET ORAL at 09:21

## 2022-03-09 RX ADMIN — Medication 5 MG: at 20:50

## 2022-03-09 RX ADMIN — ASPIRIN 81 MG: 81 TABLET, COATED ORAL at 09:22

## 2022-03-09 RX ADMIN — GABAPENTIN 100 MG: 100 CAPSULE ORAL at 17:27

## 2022-03-09 RX ADMIN — BUSPIRONE HYDROCHLORIDE 30 MG: 15 TABLET ORAL at 09:21

## 2022-03-09 RX ADMIN — SALMETEROL XINAFOATE 1 PUFF: 50 POWDER, METERED ORAL; RESPIRATORY (INHALATION) at 09:23

## 2022-03-09 RX ADMIN — MIRTAZAPINE 15 MG: 15 TABLET, FILM COATED ORAL at 20:50

## 2022-03-09 RX ADMIN — CLOZAPINE 200 MG: 100 TABLET ORAL at 20:50

## 2022-03-09 RX ADMIN — GABAPENTIN 100 MG: 100 CAPSULE ORAL at 12:53

## 2022-03-09 RX ADMIN — MEGESTROL ACETATE 20 MG: 20 TABLET ORAL at 09:21

## 2022-03-09 RX ADMIN — DONEPEZIL HYDROCHLORIDE 10 MG: 10 TABLET ORAL at 20:50

## 2022-03-09 RX ADMIN — GABAPENTIN 100 MG: 100 CAPSULE ORAL at 09:21

## 2022-03-09 RX ADMIN — MEMANTINE 10 MG: 10 TABLET ORAL at 09:20

## 2022-03-09 RX ADMIN — MEMANTINE 10 MG: 10 TABLET ORAL at 20:50

## 2022-03-09 RX ADMIN — BUSPIRONE HYDROCHLORIDE 30 MG: 15 TABLET ORAL at 20:50

## 2022-03-09 RX ADMIN — SALMETEROL XINAFOATE 1 PUFF: 50 POWDER, METERED ORAL; RESPIRATORY (INHALATION) at 20:50

## 2022-03-09 RX ADMIN — HALOPERIDOL 1 MG: 1 TABLET ORAL at 20:50

## 2022-03-09 ASSESSMENT — ACTIVITIES OF DAILY LIVING (ADL)
LAUNDRY: UNABLE TO COMPLETE
ORAL_HYGIENE: INDEPENDENT
LAUNDRY: UNABLE TO COMPLETE
HYGIENE/GROOMING: INDEPENDENT
DRESS: INDEPENDENT
ORAL_HYGIENE: INDEPENDENT
HYGIENE/GROOMING: INDEPENDENT
DRESS: INDEPENDENT

## 2022-03-09 NOTE — PLAN OF CARE
" Goal Outcome Evaluation:  Problem: Decreased Participation and Engagement (Psychotic Signs/Symptoms)  Goal: Increased Participation and Engagement (Psychotic Signs/Symptoms)  Outcome: Ongoing, Not Progressing    Problem: Decreased Participation and Engagement (Psychotic Signs/Symptoms)  Goal: Increased Participation and Engagement (Psychotic Signs/Symptoms)  Outcome: Ongoing, Not Progressing     Problem: Mood Impairment (Psychotic Signs/Symptoms)  Goal: Improved Mood Symptoms (Psychotic Signs/Symptoms)  Outcome: Ongoing, Not Progressing       Pt continues to be isolative and withdrawn.  Pt denies depression; pt endorses anxiety r/t the need \"to figure out when I can get out of here.\"  Pt endorses hearing voices, \"they just talk and talk in my head.\"  Pt denies they are command in nature and states that they do bother him, but he tries to ignore them.  Pt denies SI/SIB.  Pt ate 75% dinner meal in the lounge, but was otherwise in his room all shift.  Pt declined evening snack.  Pt drinking fluids with set-up and encouragement.  Pt offered assistance with ADLs and a shower - pt declined.  Pt refused an examination of his skin.  Pt is noted to be changing position frequently in bed.  Pt denies any pain.                 "

## 2022-03-09 NOTE — PLAN OF CARE
Problem: Behavior Regulation Impairment (Psychotic Signs/Symptoms)  Goal: Improved Behavioral Control (Psychotic Signs/Symptoms)  Outcome: Ongoing, Progressing   Goal Outcome Evaluation:           Slept well tonight for 9.5 hours  Independently repositioned self in bed  No behavioral concerns encountered this shift.

## 2022-03-09 NOTE — PROVIDER NOTIFICATION
03/09/22 0836   Vital Signs   Temp 97.5  F (36.4  C)   Pulse 120   /71   Standing Orthostatic BP   Standing Orthostatic /74   Standing Orthostatic Pulse 122 bpm   Oxygen Therapy   SpO2 99 %     Patient presenting tachycardic this AM. Fluids encouraged, will plan to recheck at 1200 (noon).

## 2022-03-09 NOTE — PROGRESS NOTES
"[facility], [place]   Psychiatric Progress Note  Hospital Day: 42        Interim History:   The patient's care was discussed with the treatment team during the daily team meeting and staff's chart notes were reviewed. Patient seen by MAIA Davila CNP  Staff report patient slept 9.5 hours and came out to the lounge for breakfast and ate 100%.     Upon interview, the patient was laying in bed and was frustrated about still being in the hospital. Patient stated \"all you can do for me is get me out of here.\" States his mood feels \"down.\"    Suicidal ideation: denies current or recent suicidal ideation or behaviors.    Homicidal ideation: denies current or recent homicidal ideation or behaviors.    Psychotic symptoms: Patient denies AH, VH, paranoia, delusions.     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  He was hospitalized at [facility] for approximately one year and then transferred to station 3[facility] mental health unit for ECT treatment where he was hospitalized 6/30/21 - 1/13/22 until it was discovered that he was COVID-19 positive. He was subsequently transferred to medicine service until 1/19/22 and then discharged to [place]'s COVID-19 unit until medically stable. He was readmitted to geriatric unit, station 3B, on 1/26/22. He is currently under commitment with Yanez being amended to include Clozaril.   Patient has been on Zyprexa, Haldol, Risperdal that were not effective. He is confused, disoriented and endorses visual " "hallucinations, however he denied auditory hallucinations. He is oriented to self only. Patient is unable to take care of himself in the community and the plan will be for the patient to have a guardian moving forward. Inpatient psychiatric hospitalization is warranted at this time for safety, stabilization, and possible adjustment in medications.     Target psychiatric symptoms and interventions:  Continue PTA medications, including:     BuSpar 30 mg 2 times daily  Clozaril 200 mg at bedtime  Aricept 10 mg at bedtime  Gabapentin 100 mg 3 times a day  Haldol 1 mg at bedtime  Namenda 10 mg 2 times a day  Remeron 15 mg at bedtime     Continue hydroxyzine 25 mg q4h prn for acute anxiety  Continue risperidone 1 mg q6h prn for severe agitation     Medicine following elevated LFTs. Statin, APAP and Trazodone held.      Continue melatonin 5mg at bedtime.      Guardianship in process. Admission to group home. pending finalization of guardianship.         Disposition Plan   Reason for ongoing admission: is unable to care for self due to severe psychosis or mariusz  Discharge location: long term  Discharge Medications: not ordered  Follow-up Appointments: not scheduled  Legal Status: full commitment         Medications:       aspirin  81 mg Oral Daily     [Held by provider] atorvastatin  80 mg Oral At Bedtime     busPIRone HCl  30 mg Oral BID     cloZAPine  200 mg Oral At Bedtime     donepezil  10 mg Oral At Bedtime     gabapentin  100 mg Oral TID w/meals     haloperidol  1 mg Oral At Bedtime     levothyroxine  88 mcg Oral Daily     megestrol  20 mg Oral Daily     melatonin  5 mg Oral At Bedtime     memantine  10 mg Oral BID     mirtazapine  15 mg Oral At Bedtime     [Held by provider] polyethylene glycol  17 g Oral BID     salmeterol  1 puff Inhalation BID            Psychiatric Examination:     /71   Pulse 120   Temp 97.5  F (36.4  C)   Resp 16   Ht 1.778 m (5' 10\")   Wt 75.3 kg (165 lb 14.4 oz)   SpO2 99%   BMI 23.80 " kg/m    Weight is 165 lbs 14.4 oz  Body mass index is 23.8 kg/m .  Orthostatic Vitals  Report      Most Recent      Standing Orthostatic /74 03/09 0836    Standing Orthostatic Pulse (bpm) 122 03/09 0836          Appearance: awake, alert and dressed in hospital scrubs  Attitude:  guarded  Eye Contact:  poor   Mood:  angry  Affect:  intensity is blunted  Speech:  mute  Psychomotor Behavior:  no evidence of tardive dyskinesia, dystonia, or tics  Thought Process:  unable to assess  Associations:  unable to assess  Thought Content:  no evidence of suicidal ideation or homicidal ideation and no evidence of psychotic thought  Insight:  limited  Judgement:  limited  Oriented to:  unable to assess  Attention Span and Concentration:  fair  Recent and Remote Memory:  poor         Allergies:     Allergies   Allergen Reactions     Ibuprofen      Latex           Labs:   No results found for this or any previous visit (from the past 24 hour(s)).         Precautions:     Behavioral Orders   Procedures     Code 1 - Restrict to Unit     Code 2     For abdominal US     Fall precautions     Routine Programming     As clinically indicated     Status 15     Every 15 minutes.       Entered by: Barbara Albrecht on March 9, 2022 at 10:09 AM

## 2022-03-09 NOTE — PLAN OF CARE
"Goal Outcome Evaluation:    Plan of Care Reviewed With: patient     RN Note:    Patient presents with Blunted/Flat affect and irritable mood. Patient was willing to engage in 1:1 check in this shift. He was mildly irritable during check in, asking when he will be discharged. Patient expressed frustration r/t ongoing hospitalization. Patient was disoriented to place and time. Patient was cooperative with treatment plan during this shift, with the exception of declining a skin assessment. Patient denies  SI, SIB, HI. Patient reports hearing \"mumbling\" voices and is ignoring them. Denies command hallucinations. Patient denies  pain. Patient was observed isolating/resting in bed for a majority of the shift. Patient  did not attend groups. Patient is med-compliant. No medication side effects observed or endorsed by patient. Patient was up eating 100% of meals. Encouraged fluid intake throughout shift and to spend time out of bed.      /71   Pulse 120   Temp 97.5  F (36.4  C)   Resp 16   Ht 1.778 m (5' 10\")   Wt 75.3 kg (165 lb 14.4 oz)   SpO2 99%   BMI 23.80 kg/m                "

## 2022-03-10 PROCEDURE — 99231 SBSQ HOSP IP/OBS SF/LOW 25: CPT

## 2022-03-10 PROCEDURE — 250N000013 HC RX MED GY IP 250 OP 250 PS 637: Performed by: PSYCHIATRY & NEUROLOGY

## 2022-03-10 PROCEDURE — 124N000003 HC R&B MH SENIOR/ADOLESCENT

## 2022-03-10 RX ADMIN — MEMANTINE 10 MG: 10 TABLET ORAL at 08:16

## 2022-03-10 RX ADMIN — MEGESTROL ACETATE 20 MG: 20 TABLET ORAL at 08:16

## 2022-03-10 RX ADMIN — GABAPENTIN 100 MG: 100 CAPSULE ORAL at 08:16

## 2022-03-10 RX ADMIN — MIRTAZAPINE 15 MG: 15 TABLET, FILM COATED ORAL at 20:05

## 2022-03-10 RX ADMIN — SALMETEROL XINAFOATE 1 PUFF: 50 POWDER, METERED ORAL; RESPIRATORY (INHALATION) at 08:17

## 2022-03-10 RX ADMIN — BUSPIRONE HYDROCHLORIDE 30 MG: 15 TABLET ORAL at 08:16

## 2022-03-10 RX ADMIN — BUSPIRONE HYDROCHLORIDE 30 MG: 15 TABLET ORAL at 20:05

## 2022-03-10 RX ADMIN — SALMETEROL XINAFOATE 1 PUFF: 50 POWDER, METERED ORAL; RESPIRATORY (INHALATION) at 20:06

## 2022-03-10 RX ADMIN — HALOPERIDOL 1 MG: 1 TABLET ORAL at 20:05

## 2022-03-10 RX ADMIN — ASPIRIN 81 MG: 81 TABLET, COATED ORAL at 08:16

## 2022-03-10 RX ADMIN — GABAPENTIN 100 MG: 100 CAPSULE ORAL at 12:21

## 2022-03-10 RX ADMIN — CLOZAPINE 200 MG: 100 TABLET ORAL at 20:05

## 2022-03-10 RX ADMIN — Medication 5 MG: at 20:05

## 2022-03-10 RX ADMIN — LEVOTHYROXINE SODIUM 88 MCG: 0.09 TABLET ORAL at 08:16

## 2022-03-10 RX ADMIN — GABAPENTIN 100 MG: 100 CAPSULE ORAL at 17:31

## 2022-03-10 RX ADMIN — MEMANTINE 10 MG: 10 TABLET ORAL at 20:05

## 2022-03-10 RX ADMIN — DONEPEZIL HYDROCHLORIDE 10 MG: 10 TABLET ORAL at 20:05

## 2022-03-10 ASSESSMENT — ACTIVITIES OF DAILY LIVING (ADL)
ORAL_HYGIENE: INDEPENDENT;PROMPTS
LAUNDRY: UNABLE TO COMPLETE
ORAL_HYGIENE: PROMPTS
HYGIENE/GROOMING: PROMPTS
HYGIENE/GROOMING: INDEPENDENT;PROMPTS
DRESS: SCRUBS (BEHAVIORAL HEALTH)
DRESS: SCRUBS (BEHAVIORAL HEALTH);INDEPENDENT

## 2022-03-10 NOTE — PLAN OF CARE
Problem: Sleep Disturbance (Psychotic Signs/Symptoms)  Goal: Improved Sleep (Psychotic Signs/Symptoms)  Outcome: Ongoing, Progressing    Patient slept a total of 9 hours. No complaints made the whole night.

## 2022-03-10 NOTE — PLAN OF CARE
"  Problem: Depression  Goal: Improved Mood  Outcome: Met   Goal Outcome Evaluation:    Plan of Care Reviewed With: patient               Nursing Assessment    Psychosis (H) [F29]    Admit Date: 1/26/2022    Length of Stay: 43    Patient evaluation continues. Assessed mood,anxiety,thoughts and behavior. Patient is progressing towards goals. Patient is encouraged to participate in groups and assisted to develop healthy coping skills.  Patient denies auditory or visual hallucinations. /74 (BP Location: Left arm, Patient Position: Sitting)   Pulse 97   Temp 97.4  F (36.3  C) (Temporal)   Resp 16   Ht 1.778 m (5' 10\")   Wt 75.2 kg (165 lb 11.2 oz)   SpO2 96%   BMI 23.78 kg/m      Mood: ok    Patient reports depression 0/10 and reports anxiety 0/10    Affect:flat blunted    Sleep: good 9-10 hours each night    Appetite: good 100%    SI: denies    HI: denies    SIB: denies      Medication Compliance yes    Group participation:none    ADL's: refuses at times combed hair    Fall risk interventions: proper foot wear,orthostatic B/P    Rinku Score Interventions: none    Discharge planning in process    Refer to daily team meeting notes for individualized plan of care. Nursing will continue to assess.    *Scale is 1-10 and 10 is the worst.         "

## 2022-03-10 NOTE — PROGRESS NOTES
"[facility], [place]   Psychiatric Progress Note  Hospital Day: 43        Interim History:   The patient's care was discussed with the treatment team during the daily team meeting and staff's chart notes were reviewed. Patient seen by MAIA Davila CNP  Staff report patient slept for 9 hours, came out for breakfast, and ate 100%.     Upon interview, the patient was agreeable to evaluation. Stated his mood was \"alright\" and that he slept \"good.\" States he is excited to get discharged from the hospital.     Suicidal ideation: denies current or recent suicidal ideation or behaviors.    Homicidal ideation: denies current or recent homicidal ideation or behaviors.    Psychotic symptoms:  Patient denies AH, VH, paranoia, delusions.     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  He was hospitalized at [facility] for approximately one year and then transferred to station 3[facility] mental health unit for ECT treatment where he was hospitalized 6/30/21 - 1/13/22 until it was discovered that he was COVID-19 positive. He was subsequently transferred to medicine service until 1/19/22 and then discharged to [facility] COVID-19 unit until medically stable. He was readmitted to geriatric unit, station 3B, on 1/26/22. He is currently under commitment with Yanez being amended to include Clozaril.   Patient has been on Zyprexa, Haldol, Risperdal that were not effective. He is confused, disoriented and endorses visual hallucinations, however he denied " "auditory hallucinations. He is oriented to self only. Patient is unable to take care of himself in the community and the plan will be for the patient to have a guardian moving forward. Inpatient psychiatric hospitalization is warranted at this time for safety, stabilization, and possible adjustment in medications.     Target psychiatric symptoms and interventions:  Continue PTA medications, including:     BuSpar 30 mg 2 times daily  Clozaril 200 mg at bedtime  Aricept 10 mg at bedtime  Gabapentin 100 mg 3 times a day  Haldol 1 mg at bedtime  Namenda 10 mg 2 times a day  Remeron 15 mg at bedtime     Continue hydroxyzine 25 mg q4h prn for acute anxiety  Continue risperidone 1 mg q6h prn for severe agitation     Medicine following elevated LFTs. Statin, APAP and Trazodone held.      Continue melatonin 5mg at bedtime.      Guardianship in process. Admission to group home. pending finalization of guardianship.         Disposition Plan   Reason for ongoing admission: is unable to care for self due to severe psychosis or mariusz  Discharge location: [place]  Discharge Medications: not ordered  Follow-up Appointments: not scheduled  Legal Status: full commitment         Medications:       aspirin  81 mg Oral Daily     [Held by provider] atorvastatin  80 mg Oral At Bedtime     busPIRone HCl  30 mg Oral BID     cloZAPine  200 mg Oral At Bedtime     donepezil  10 mg Oral At Bedtime     gabapentin  100 mg Oral TID w/meals     haloperidol  1 mg Oral At Bedtime     levothyroxine  88 mcg Oral Daily     megestrol  20 mg Oral Daily     melatonin  5 mg Oral At Bedtime     memantine  10 mg Oral BID     mirtazapine  15 mg Oral At Bedtime     [Held by provider] polyethylene glycol  17 g Oral BID     salmeterol  1 puff Inhalation BID            Psychiatric Examination:     /74 (BP Location: Left arm, Patient Position: Sitting)   Pulse 97   Temp 97.4  F (36.3  C) (Temporal)   Resp 16   Ht 1.778 m (5' 10\")   Wt 75.2 kg (165 lb 11.2 " oz)   SpO2 96%   BMI 23.78 kg/m    Weight is 165 lbs 11.2 oz  Body mass index is 23.78 kg/m .  Orthostatic Vitals  Report      Most Recent      Standing Orthostatic /74 03/09 0836    Standing Orthostatic Pulse (bpm) 122 03/09 0836          Appearance: awake, alert  Attitude:  cooperative  Eye Contact:  fair  Mood:  better  Affect:  appropriate and in normal range and mood congruent  Speech:  mumbling  Psychomotor Behavior:  no evidence of tardive dyskinesia, dystonia, or tics  Thought Process:  logical, linear and goal oriented  Associations:  no loose associations  Thought Content:  no evidence of suicidal ideation or homicidal ideation and no evidence of psychotic thought  Insight:  limited  Judgement:  limited  Oriented to:  time, person, and place  Attention Span and Concentration:  fair  Recent and Remote Memory:  poor           Allergies:     Allergies   Allergen Reactions     Ibuprofen      Latex           Labs:   No results found for this or any previous visit (from the past 24 hour(s)).         Precautions:     Behavioral Orders   Procedures     Code 1 - Restrict to Unit     Code 2     For abdominal US     Fall precautions     Routine Programming     As clinically indicated     Status 15     Every 15 minutes.       Entered by: Barbara Albrecht on March 10, 2022 at 10:59 AM

## 2022-03-10 NOTE — PLAN OF CARE
"Goal Outcome Evaluation:no change    Patient admits to being depressed but denies anxiety and SI.  Also hears \"soft\" voices that are not troublesome to him.  Isolates to his room and is withdrawn.  Offers no new concerns.  P:  continue same plan of care.                      "

## 2022-03-11 PROCEDURE — 250N000013 HC RX MED GY IP 250 OP 250 PS 637: Performed by: PSYCHIATRY & NEUROLOGY

## 2022-03-11 PROCEDURE — 124N000003 HC R&B MH SENIOR/ADOLESCENT

## 2022-03-11 PROCEDURE — 99231 SBSQ HOSP IP/OBS SF/LOW 25: CPT

## 2022-03-11 RX ADMIN — BUSPIRONE HYDROCHLORIDE 30 MG: 15 TABLET ORAL at 20:17

## 2022-03-11 RX ADMIN — CLOZAPINE 200 MG: 100 TABLET ORAL at 20:18

## 2022-03-11 RX ADMIN — Medication 5 MG: at 20:18

## 2022-03-11 RX ADMIN — GABAPENTIN 100 MG: 100 CAPSULE ORAL at 07:57

## 2022-03-11 RX ADMIN — HALOPERIDOL 1 MG: 1 TABLET ORAL at 20:18

## 2022-03-11 RX ADMIN — MIRTAZAPINE 15 MG: 15 TABLET, FILM COATED ORAL at 20:18

## 2022-03-11 RX ADMIN — MEMANTINE 10 MG: 10 TABLET ORAL at 07:57

## 2022-03-11 RX ADMIN — MEMANTINE 10 MG: 10 TABLET ORAL at 20:18

## 2022-03-11 RX ADMIN — SALMETEROL XINAFOATE 1 PUFF: 50 POWDER, METERED ORAL; RESPIRATORY (INHALATION) at 20:18

## 2022-03-11 RX ADMIN — SALMETEROL XINAFOATE 1 PUFF: 50 POWDER, METERED ORAL; RESPIRATORY (INHALATION) at 07:58

## 2022-03-11 RX ADMIN — MEGESTROL ACETATE 20 MG: 20 TABLET ORAL at 07:57

## 2022-03-11 RX ADMIN — LEVOTHYROXINE SODIUM 88 MCG: 0.09 TABLET ORAL at 07:57

## 2022-03-11 RX ADMIN — ASPIRIN 81 MG: 81 TABLET, COATED ORAL at 07:57

## 2022-03-11 RX ADMIN — DONEPEZIL HYDROCHLORIDE 10 MG: 10 TABLET ORAL at 20:18

## 2022-03-11 RX ADMIN — GABAPENTIN 100 MG: 100 CAPSULE ORAL at 17:31

## 2022-03-11 RX ADMIN — BUSPIRONE HYDROCHLORIDE 30 MG: 15 TABLET ORAL at 07:57

## 2022-03-11 RX ADMIN — GABAPENTIN 100 MG: 100 CAPSULE ORAL at 12:16

## 2022-03-11 ASSESSMENT — ACTIVITIES OF DAILY LIVING (ADL)
ORAL_HYGIENE: INDEPENDENT;PROMPTS
ORAL_HYGIENE: INDEPENDENT;PROMPTS
LAUNDRY: UNABLE TO COMPLETE
HYGIENE/GROOMING: INDEPENDENT;PROMPTS
HYGIENE/GROOMING: INDEPENDENT;PROMPTS
DRESS: SCRUBS (BEHAVIORAL HEALTH)
DRESS: SCRUBS (BEHAVIORAL HEALTH);INDEPENDENT

## 2022-03-11 NOTE — PLAN OF CARE
Problem: Sleep Disturbance (Psychotic Signs/Symptoms)  Goal: Improved Sleep (Psychotic Signs/Symptoms)  Outcome: Ongoing, Progressing     Patient went to the bathroom and voided once last night. Went back to sleep as soon as he returned to bed.     Slept a total of  11.5 hours. No psychotic symptoms noted the whole shift.

## 2022-03-11 NOTE — PLAN OF CARE
Goal Outcome Evaluation:    Plan of Care Reviewed With: patient       Patient has remained in his room all shift only coming out for dinner. He ate 100%. His affect is flat. His mood is calm. He denies SI and SIB. He denies depression and anxiety. He denies AH and VH. He is alert to self only. He is disheveled and unkept. He refused to take a shower when offered. He is medication compliant. He denies pain. He voices no concerns.

## 2022-03-11 NOTE — CARE PLAN
Assessment/Intervention/Current Symtoms and Care Coordination:  -Reviewed pt's record to get up to date with his current status and plan  -Rounded with team  - Pt is showing improvements in terms of ambulation and eating. Pt comes out and eats his food in the milieu. He has been noticed to ask questions related to when he will be discharging from the hospital.  Hygine continues to be a challenge.  -Referral status: Called [facility] and attempted to get a referral update. Emily told writer she will call back. Writer did not receive a call yet.   -[facility] called Julia and attempted to coordinate services with him. Writer left a  a requested a call back.     Discharge Plan or Goal:  Pending stabilization & development of a safe discharge plan.  LSS is working on streamlining guardianship     Barriers to Discharge:  History of aggression and elopement.  Lack of current guardian to make health decisions.   Several referrals made without acceptance      Referral Status:  None today

## 2022-03-11 NOTE — PLAN OF CARE
"  Problem: Suicidal Behavior  Goal: Suicidal Behavior is Absent or Managed  Outcome: Met  Flowsheets (Taken 3/11/2022 1353)  Mutually Determined Action Steps (Suicidal Behavior Absent/Managed): verbalizes safety check rationale   Goal Outcome Evaluation:    Plan of Care Reviewed With: patient               Nursing Assessment    Psychosis (H) [F29]    Admit Date: 1/26/2022    Length of Stay: 44    Patient evaluation continues. Assessed mood,anxiety,thoughts and behavior. Patient is progressing towards goals. Patient is encouraged to participate in groups and assisted to develop healthy coping skills.  Patient denies auditory or visual hallucinations. /70 (BP Location: Right arm)   Pulse 94   Temp 97.3  F (36.3  C) (Temporal)   Resp 16   Ht 1.778 m (5' 10\")   Wt 75.2 kg (165 lb 11.2 oz)   SpO2 97%   BMI 23.78 kg/m      Mood: \"ok\"    Patient reports depression 0/10 and reports anxiety 0/10    Affect:flat blunted    Sleep: good    Appetite: good    SI: denies    HI: denies    SIB: denies      Medication Compliance medication complaint    Group participation:none    ADL's: refuses, needs encouragement    Fall risk interventions: proper foot wear, orthostatic B/P    Rinku Score Interventions: none    Discharge planning in process    Refer to daily team meeting notes for individualized plan of care. Nursing will continue to assess.    *Scale is 1-10 and 10 is the worst.         "

## 2022-03-11 NOTE — PROGRESS NOTES
"[facility], [place]   Psychiatric Progress Note  Hospital Day: 44        Interim History:   The patient's care was discussed with the treatment team during the daily team meeting and staff's chart notes were reviewed. Patient seen by MAIA Davila CNP  Staff report patient slept 11 hours with no concerns throughout the shift.     Upon interview, the patient states his mood is \"alright\" and looks forward to getting discharged from the hospital once his guardianship is finalized. Reports feeling tired. Was able to attend to ADL's and changed his clothes into clean ones.     Suicidal ideation: denies current or recent suicidal ideation or behaviors.    Homicidal ideation: denies current or recent homicidal ideation or behaviors.    Psychotic symptoms:  Patient denies AH, VH, paranoia, delusions.          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  He was hospitalized at [facility] for approximately one year and then transferred to station 3[facility] mental health unit for ECT treatment where he was hospitalized 6/30/21 - 1/13/22 until it was discovered that he was COVID-19 positive. He was subsequently transferred to medicine service until 1/19/22 and then discharged to [facility] COVID-19 unit until medically stable. He was readmitted to geriatric unit, station 3B, on 1/26/22. He is currently under commitment with Yanez being amended to include Clozaril.   Patient has been on Zyprexa, Haldol, Risperdal that were not effective. He is confused, disoriented and endorses visual " "hallucinations, however he denied auditory hallucinations. He is oriented to self only. Patient is unable to take care of himself in the community and the plan will be for the patient to have a guardian moving forward. Inpatient psychiatric hospitalization is warranted at this time for safety, stabilization, and possible adjustment in medications.     Target psychiatric symptoms and interventions:  Continue PTA medications, including:     BuSpar 30 mg 2 times daily  Clozaril 200 mg at bedtime  Aricept 10 mg at bedtime  Gabapentin 100 mg 3 times a day  Haldol 1 mg at bedtime  Namenda 10 mg 2 times a day  Remeron 15 mg at bedtime     Continue hydroxyzine 25 mg q4h prn for acute anxiety  Continue risperidone 1 mg q6h prn for severe agitation     Medicine following elevated LFTs. Statin, APAP and Trazodone held.      Continue melatonin 5mg at bedtime.      Guardianship in process. Admission to group home. pending finalization of guardianship.         Disposition Plan   Reason for ongoing admission: is unable to care for self due to severe psychosis or mariusz  Discharge location: group home  Discharge Medications: not ordered  Follow-up Appointments: not scheduled  Legal Status: full commitment         Medications:       aspirin  81 mg Oral Daily     [Held by provider] atorvastatin  80 mg Oral At Bedtime     busPIRone HCl  30 mg Oral BID     cloZAPine  200 mg Oral At Bedtime     donepezil  10 mg Oral At Bedtime     gabapentin  100 mg Oral TID w/meals     haloperidol  1 mg Oral At Bedtime     levothyroxine  88 mcg Oral Daily     megestrol  20 mg Oral Daily     melatonin  5 mg Oral At Bedtime     memantine  10 mg Oral BID     mirtazapine  15 mg Oral At Bedtime     [Held by provider] polyethylene glycol  17 g Oral BID     salmeterol  1 puff Inhalation BID            Psychiatric Examination:     /76   Pulse 94   Temp 97.3  F (36.3  C) (Temporal)   Resp 16   Ht 1.778 m (5' 10\")   Wt 75.2 kg (165 lb 11.2 oz)   SpO2 97%   " "BMI 23.78 kg/m    Weight is 165 lbs 11.2 oz  Body mass index is 23.78 kg/m .  Orthostatic Vitals  Report    None        Appearance: awake, alert and dressed in hospital scrubs  Attitude:  cooperative  Eye Contact:  fair  Mood:  \"alright\"  Affect:  appropriate and in normal range and mood congruent  Speech:  mumbling  Psychomotor Behavior:  no evidence of tardive dyskinesia, dystonia, or tics  Thought Process:  concrete  Associations:  no loose associations  Thought Content:  no evidence of suicidal ideation or homicidal ideation and no evidence of psychotic thought  Insight:  limited  Judgement:  limited  Oriented to:  time, person, and place  Attention Span and Concentration:  limited  Recent and Remote Memory:  poor           Allergies:     Allergies   Allergen Reactions     Ibuprofen      Latex           Labs:   No results found for this or any previous visit (from the past 24 hour(s)).         Precautions:     Behavioral Orders   Procedures     Code 1 - Restrict to Unit     Code 2     For abdominal US     Fall precautions     Routine Programming     As clinically indicated     Status 15     Every 15 minutes.       Entered by: Babrara Albrecht on March 11, 2022 at 9:57 AM              "

## 2022-03-12 PROCEDURE — 250N000013 HC RX MED GY IP 250 OP 250 PS 637: Performed by: PSYCHIATRY & NEUROLOGY

## 2022-03-12 PROCEDURE — 124N000003 HC R&B MH SENIOR/ADOLESCENT

## 2022-03-12 RX ADMIN — ASPIRIN 81 MG: 81 TABLET, COATED ORAL at 09:25

## 2022-03-12 RX ADMIN — MEMANTINE 10 MG: 10 TABLET ORAL at 20:16

## 2022-03-12 RX ADMIN — SALMETEROL XINAFOATE 1 PUFF: 50 POWDER, METERED ORAL; RESPIRATORY (INHALATION) at 09:30

## 2022-03-12 RX ADMIN — LEVOTHYROXINE SODIUM 88 MCG: 0.09 TABLET ORAL at 09:28

## 2022-03-12 RX ADMIN — MEMANTINE 10 MG: 10 TABLET ORAL at 09:29

## 2022-03-12 RX ADMIN — HALOPERIDOL 1 MG: 1 TABLET ORAL at 20:16

## 2022-03-12 RX ADMIN — GABAPENTIN 100 MG: 100 CAPSULE ORAL at 09:29

## 2022-03-12 RX ADMIN — MIRTAZAPINE 15 MG: 15 TABLET, FILM COATED ORAL at 20:16

## 2022-03-12 RX ADMIN — DONEPEZIL HYDROCHLORIDE 10 MG: 10 TABLET ORAL at 20:16

## 2022-03-12 RX ADMIN — GABAPENTIN 100 MG: 100 CAPSULE ORAL at 12:17

## 2022-03-12 RX ADMIN — BUSPIRONE HYDROCHLORIDE 30 MG: 15 TABLET ORAL at 09:27

## 2022-03-12 RX ADMIN — BUSPIRONE HYDROCHLORIDE 30 MG: 15 TABLET ORAL at 20:16

## 2022-03-12 RX ADMIN — SALMETEROL XINAFOATE 1 PUFF: 50 POWDER, METERED ORAL; RESPIRATORY (INHALATION) at 20:16

## 2022-03-12 RX ADMIN — CLOZAPINE 200 MG: 100 TABLET ORAL at 20:16

## 2022-03-12 RX ADMIN — GABAPENTIN 100 MG: 100 CAPSULE ORAL at 17:15

## 2022-03-12 RX ADMIN — Medication 5 MG: at 20:16

## 2022-03-12 RX ADMIN — MEGESTROL ACETATE 20 MG: 20 TABLET ORAL at 09:29

## 2022-03-12 ASSESSMENT — ACTIVITIES OF DAILY LIVING (ADL)
DRESS: INDEPENDENT;SCRUBS (BEHAVIORAL HEALTH)
LAUNDRY: UNABLE TO COMPLETE
HYGIENE/GROOMING: INDEPENDENT
ORAL_HYGIENE: INDEPENDENT;PROMPTS
DRESS: INDEPENDENT;SCRUBS (BEHAVIORAL HEALTH)
LAUNDRY: UNABLE TO COMPLETE
HYGIENE/GROOMING: INDEPENDENT;PROMPTS
ORAL_HYGIENE: INDEPENDENT

## 2022-03-12 NOTE — PLAN OF CARE
Patient was out in the dinning room for meals. Presented with blunted flat affect with calm mood. Withdrawn and isolative, spent majority of time sleeping. Refused to perform personal hygiene, unkempt, unclean and odor noted. Encouraged to participate in activities and socialize with peers in the lounge area but declined all the time that was approached. Denies all mental health symptoms including SI/SIB/AVH. Denies pain. Medication compliant, no side effects noted during the shift.   Plan: Will continue to encourage patient to participate in group activities, continue with current plan of care and status 15 checks.     Problem: Mood Impairment (Psychotic Signs/Symptoms)  Goal: Improved Mood Symptoms (Psychotic Signs/Symptoms)  Outcome: Ongoing, Not Progressing       Goal Outcome Evaluation:    Plan of Care Reviewed With: patient

## 2022-03-12 NOTE — PLAN OF CARE
"Goal Outcome Evaluation:    Plan of Care Reviewed With: patient     Patient is isolative and withdrawn. He came to the dining room for dinner only. He ate 100%. He refused to attend groups. His affect is flat. His mood is calm. He denies anxiety and depression. He endorses AH stating \"I always hear voices\". He refused to shower. He is medication compliant. He denies pain. He voices no concerns. He is alert to self only.                "

## 2022-03-12 NOTE — PLAN OF CARE
Problem: Sleep Disturbance (Psychotic Signs/Symptoms)  Goal: Improved Sleep (Psychotic Signs/Symptoms)  Outcome: Ongoing, Progressing     Patient slept a total of10 hours. No complaints presented the whole shift

## 2022-03-13 PROCEDURE — 250N000013 HC RX MED GY IP 250 OP 250 PS 637: Performed by: PSYCHIATRY & NEUROLOGY

## 2022-03-13 PROCEDURE — 124N000003 HC R&B MH SENIOR/ADOLESCENT

## 2022-03-13 RX ADMIN — Medication 5 MG: at 19:54

## 2022-03-13 RX ADMIN — MEMANTINE 10 MG: 10 TABLET ORAL at 08:51

## 2022-03-13 RX ADMIN — MEMANTINE 10 MG: 10 TABLET ORAL at 19:54

## 2022-03-13 RX ADMIN — CLOZAPINE 200 MG: 100 TABLET ORAL at 19:53

## 2022-03-13 RX ADMIN — GABAPENTIN 100 MG: 100 CAPSULE ORAL at 17:17

## 2022-03-13 RX ADMIN — BUSPIRONE HYDROCHLORIDE 30 MG: 15 TABLET ORAL at 19:53

## 2022-03-13 RX ADMIN — BUSPIRONE HYDROCHLORIDE 30 MG: 15 TABLET ORAL at 08:51

## 2022-03-13 RX ADMIN — GABAPENTIN 100 MG: 100 CAPSULE ORAL at 08:51

## 2022-03-13 RX ADMIN — MIRTAZAPINE 15 MG: 15 TABLET, FILM COATED ORAL at 19:53

## 2022-03-13 RX ADMIN — GABAPENTIN 100 MG: 100 CAPSULE ORAL at 12:35

## 2022-03-13 RX ADMIN — SALMETEROL XINAFOATE 1 PUFF: 50 POWDER, METERED ORAL; RESPIRATORY (INHALATION) at 19:55

## 2022-03-13 RX ADMIN — HALOPERIDOL 1 MG: 1 TABLET ORAL at 19:53

## 2022-03-13 RX ADMIN — DONEPEZIL HYDROCHLORIDE 10 MG: 10 TABLET ORAL at 19:54

## 2022-03-13 RX ADMIN — LEVOTHYROXINE SODIUM 88 MCG: 0.09 TABLET ORAL at 08:51

## 2022-03-13 RX ADMIN — MEGESTROL ACETATE 20 MG: 20 TABLET ORAL at 08:51

## 2022-03-13 RX ADMIN — ASPIRIN 81 MG: 81 TABLET, COATED ORAL at 08:51

## 2022-03-13 RX ADMIN — SALMETEROL XINAFOATE 1 PUFF: 50 POWDER, METERED ORAL; RESPIRATORY (INHALATION) at 08:52

## 2022-03-13 ASSESSMENT — ACTIVITIES OF DAILY LIVING (ADL)
ORAL_HYGIENE: INDEPENDENT;PROMPTS
LAUNDRY: UNABLE TO COMPLETE
HYGIENE/GROOMING: INDEPENDENT;PROMPTS
DRESS: SCRUBS (BEHAVIORAL HEALTH);INDEPENDENT
HYGIENE/GROOMING: INDEPENDENT;PROMPTS
DRESS: INDEPENDENT
ORAL_HYGIENE: INDEPENDENT;PROMPTS
LAUNDRY: UNABLE TO COMPLETE

## 2022-03-13 NOTE — PLAN OF CARE
"  Problem: Behavior Regulation Impairment (Psychotic Signs/Symptoms)  Goal: Improved Behavioral Control (Psychotic Signs/Symptoms)  Outcome: Ongoing, Not Progressing     Problem: Cognitive Impairment (Psychotic Signs/Symptoms)  Goal: Optimal Cognitive Function (Psychotic Signs/Symptoms)  Outcome: Ongoing, Not Progressing     Problem: Mood Impairment (Psychotic Signs/Symptoms)  Goal: Improved Mood Symptoms (Psychotic Signs/Symptoms)  Outcome: Ongoing, Not Progressing     Behavioral  Pt slept 11.75 hours overnight; Pt oriented to self and place; disoriented situation and date -stated it is June 1999; Pt guarded and irritable upon approach; pt difficult to engage;  pt compliant with medications; declined shower and self cares despite encouragement;  Pt eating 75% of meals and hydrating adequately; Pt isolative and withdrawn to room/self; visible in the milieu for lunch; did not attend groups; speech quiet and garbled; Pt denied SI, SIB, HI; endorsed visual and auditory hallucinations; pt stated he sees his mom in the room and hears voices (did not elaborate on voices). Pt endorses depression stating \"I am depressed because I have been stuck in the [facility] for over a year.\"    Medical  Pt endorses no new medical complaints    Plan  LSS working on streamlining guardianship; place and date of discharge from hospital TBC  "

## 2022-03-13 NOTE — PROVIDER NOTIFICATION
03/13/22 0625   Sleep/Rest   Night Time # Hours 7 hours     Pt slept approximately 7 hours, no problems identified during the night.

## 2022-03-13 NOTE — PLAN OF CARE
"Goal Outcome Evaluation:    Patient has been isolative and withdrawn. He only came out of his room for dinner. He ate 100%. He denies SI and SIB. He denies AH. He denies depression and anxiety. He did not attend groups. He refused to take a shower stating \"I'll take one tomorrow morning\". He is not social with peers and staff. He is medication compliant. He is alert to self only. He denies pain.                      "

## 2022-03-14 PROCEDURE — 250N000013 HC RX MED GY IP 250 OP 250 PS 637: Performed by: PSYCHIATRY & NEUROLOGY

## 2022-03-14 PROCEDURE — 99231 SBSQ HOSP IP/OBS SF/LOW 25: CPT

## 2022-03-14 PROCEDURE — 124N000003 HC R&B MH SENIOR/ADOLESCENT

## 2022-03-14 RX ADMIN — GABAPENTIN 100 MG: 100 CAPSULE ORAL at 17:21

## 2022-03-14 RX ADMIN — GABAPENTIN 100 MG: 100 CAPSULE ORAL at 12:31

## 2022-03-14 RX ADMIN — MEMANTINE 10 MG: 10 TABLET ORAL at 20:35

## 2022-03-14 RX ADMIN — MEMANTINE 10 MG: 10 TABLET ORAL at 08:31

## 2022-03-14 RX ADMIN — Medication 5 MG: at 20:35

## 2022-03-14 RX ADMIN — BUSPIRONE HYDROCHLORIDE 30 MG: 15 TABLET ORAL at 20:34

## 2022-03-14 RX ADMIN — BUSPIRONE HYDROCHLORIDE 30 MG: 15 TABLET ORAL at 08:31

## 2022-03-14 RX ADMIN — HALOPERIDOL 1 MG: 1 TABLET ORAL at 20:34

## 2022-03-14 RX ADMIN — ASPIRIN 81 MG: 81 TABLET, COATED ORAL at 08:30

## 2022-03-14 RX ADMIN — MEGESTROL ACETATE 20 MG: 20 TABLET ORAL at 08:30

## 2022-03-14 RX ADMIN — SALMETEROL XINAFOATE 1 PUFF: 50 POWDER, METERED ORAL; RESPIRATORY (INHALATION) at 20:36

## 2022-03-14 RX ADMIN — GABAPENTIN 100 MG: 100 CAPSULE ORAL at 08:30

## 2022-03-14 RX ADMIN — LEVOTHYROXINE SODIUM 88 MCG: 0.09 TABLET ORAL at 08:31

## 2022-03-14 RX ADMIN — MIRTAZAPINE 15 MG: 15 TABLET, FILM COATED ORAL at 20:34

## 2022-03-14 RX ADMIN — SALMETEROL XINAFOATE 1 PUFF: 50 POWDER, METERED ORAL; RESPIRATORY (INHALATION) at 08:31

## 2022-03-14 RX ADMIN — CLOZAPINE 200 MG: 100 TABLET ORAL at 20:34

## 2022-03-14 RX ADMIN — DONEPEZIL HYDROCHLORIDE 10 MG: 10 TABLET ORAL at 20:35

## 2022-03-14 ASSESSMENT — ACTIVITIES OF DAILY LIVING (ADL)
LAUNDRY: UNABLE TO COMPLETE
ORAL_HYGIENE: INDEPENDENT;PROMPTS
DRESS: SCRUBS (BEHAVIORAL HEALTH)
HYGIENE/GROOMING: INDEPENDENT;PROMPTS

## 2022-03-14 NOTE — PLAN OF CARE
Goal Outcome Evaluation:  unchanged    Patient remains isolative and withdrawn, disoriented to time and place.  Endorses depression (not able to rate) denies anxiety and visual hallucinations, but admits to auditory hallucinations which he says do not bother him.    No further concerns or complaints.

## 2022-03-14 NOTE — PROGRESS NOTES
"[facility], [place]   Psychiatric Progress Note  Hospital Day: 47        Interim History:   The patient's care was discussed with the treatment team during the daily team meeting and staff's chart notes were reviewed. Patient seen by MAIA Davila CNP  Staff report patient slept a total of 11.25 hours without any interruptions. No behavioral issues noted the whole shift. Treatment team talked about possibly going outside with patient accompanied by security later this week weather permitting.     Upon interview, the patient stated he was \"tired\" but states he slept well. Denies suicidal/homicidal ideation. Guardianship paperwork is still in process,  does have placement for patient pending guardianship approval.      Suicidal ideation: denies current or recent suicidal ideation or behaviors.    Homicidal ideation: denies current or recent homicidal ideation or behaviors.    Psychotic symptoms: Staff report patient stated yesterday and today that he is seeing images of his mother. Denies auditory hallucinations.            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  He was hospitalized at [facility] for approximately one year and then transferred to [facility] 3B mental health unit for ECT treatment where he was hospitalized 6/30/21 - 1/13/22 until it was discovered that he was COVID-19 positive. He was subsequently transferred to medicine service until 1/19/22 and then discharged to [facility] COVID-19 unit until medically stable. He was readmitted to geriatric " unit, station 3B, on 1/26/22. He is currently under commitment with Yanez being amended to include Clozaril.   Patient has been on Zyprexa, Haldol, Risperdal that were not effective. He is confused, disoriented and endorses visual hallucinations, however he denied auditory hallucinations. He is oriented to self only. Patient is unable to take care of himself in the community and the plan will be for the patient to have a guardian moving forward. Inpatient psychiatric hospitalization is warranted at this time for safety, stabilization, and possible adjustment in medications.     Target psychiatric symptoms and interventions:   1. Continue PTA medications, including:     BuSpar 30 mg 2 times daily  Clozaril 200 mg at bedtime  Aricept 10 mg at bedtime  Gabapentin 100 mg 3 times a day  Haldol 1 mg at bedtime  Namenda 10 mg 2 times a day  Remeron 15 mg at bedtime     Continue hydroxyzine 25 mg q4h prn for acute anxiety  Continue risperidone 1 mg q6h prn for severe agitation  Continue melatonin 5mg at bedtime.     2. Medicine following elevated LFTs. Statin, APAP and Trazodone held.          3. Guardianship in process. Admission to group home. pending finalization of guardianship.     4. Working with treatment team, director of nursing to look into possibility of letting patient go outside to garden with staff/security.  Last Vitamin D level was 99 on 11/4/21, however patient is no longer getting supplement. Will add Vitamin D level to CBC lab scheduled for every 14 days. Pharmacy also recommended rechecking LFT's, will order that.     5. Continue Status 15 minute checks.     Spent more than 35 minutes on this patient's case. More than 50% was spent working with treatment team for coordination of care, ordering labs, and counseling/evaluating patient regarding visual hallucinations, discharge planning, and guardianship process.         Disposition Plan   Reason for ongoing admission: is unable to care for self due to  "severe psychosis or mariusz  Discharge location: [place]  Discharge Medications: not ordered  Follow-up Appointments: not scheduled  Legal Status: full commitment         Medications:       aspirin  81 mg Oral Daily     [Held by provider] atorvastatin  80 mg Oral At Bedtime     busPIRone HCl  30 mg Oral BID     cloZAPine  200 mg Oral At Bedtime     donepezil  10 mg Oral At Bedtime     gabapentin  100 mg Oral TID w/meals     haloperidol  1 mg Oral At Bedtime     levothyroxine  88 mcg Oral Daily     megestrol  20 mg Oral Daily     melatonin  5 mg Oral At Bedtime     memantine  10 mg Oral BID     mirtazapine  15 mg Oral At Bedtime     [Held by provider] polyethylene glycol  17 g Oral BID     salmeterol  1 puff Inhalation BID            Psychiatric Examination:     /83 (Patient Position: Supine)   Pulse 93   Temp 97.6  F (36.4  C) (Temporal)   Resp 16   Ht 1.778 m (5' 10\")   Wt 75.2 kg (165 lb 11.2 oz)   SpO2 99%   BMI 23.78 kg/m    Weight is 165 lbs 11.2 oz  Body mass index is 23.78 kg/m .  Orthostatic Vitals  Report    None          Appearance: awake, alert and adequately groomed  Attitude:  cooperative  Eye Contact:  poor   Mood:  sad   Affect:  intensity is blunted  Speech:  mumbling  Psychomotor Behavior:  no evidence of tardive dyskinesia, dystonia, or tics  Thought Process:  concrete  Associations:  no loose associations  Thought Content:  no evidence of suicidal ideation or homicidal ideation  Insight:  limited  Judgement:  limited  Oriented to:  time, person, and place  Attention Span and Concentration:  poor  Recent and Remote Memory:  poor           Allergies:     Allergies   Allergen Reactions     Ibuprofen      Latex           Labs:   No results found for this or any previous visit (from the past 24 hour(s)).         Precautions:     Behavioral Orders   Procedures     Code 1 - Restrict to Unit     Code 2     For abdominal US     Fall precautions     Routine Programming     As clinically indicated     " Status 15     Every 15 minutes.       Entered by: Barbara Albrecht on March 14, 2022 at 10:45 AM

## 2022-03-14 NOTE — PLAN OF CARE
Problem: Sleep Disturbance (Psychotic Signs/Symptoms)  Goal: Improved Sleep (Psychotic Signs/Symptoms)  Outcome: Ongoing, Progressing     Patient slept a total of 11.25 hours without any interruptions. No behavioral issues noted the whole shift.

## 2022-03-14 NOTE — PLAN OF CARE
"Goal Outcome Evaluation:    Patient is isolative and withdrawn. His affect is flat. His mood is calm. He came out of his room for dinner only. He ate 100%. He avoids social interaction. He refused to attend groups. He is disheveled and unkept with poor hygiene. He refused to take a shower when offered. He is medication compliant. He is alert to self only. He denies SI and SIB. He denies AH. He denies depression. He states he has anxiety from \"being in this fucking place for so long, I'm going to amanda this place and everyone\". He stated \"I have a mansion in [place],  I have places to live\". 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                         "

## 2022-03-14 NOTE — PLAN OF CARE
"  Problem: Behavioral Health Plan of Care  Goal: Optimized Coping Skills in Response to Life Stressors  Outcome: Ongoing, Not Progressing     Problem: Behavior Regulation Impairment (Psychotic Signs/Symptoms)  Goal: Improved Behavioral Control (Psychotic Signs/Symptoms)  Outcome: Ongoing, Not Progressing     Problem: Cognitive Impairment (Psychotic Signs/Symptoms)  Goal: Optimal Cognitive Function (Psychotic Signs/Symptoms)  Outcome: Ongoing, Not Progressing     Behavioral  Pt slept 11.25 hours overnight; Pt disoriented to situation and time; Pt guarded, but  cooperative upon approach; pt engages in minimal conversation and minimally engages with eye contact; pt compliant with medications; pt declines cares- encouraged to shower;  Pt eating and hydrating adequately; Pt isolative and withdrawn to room/self; did not attend groups; speech fluent and appropriate; Pt denied SI, SIB, and HI; endorsed visual hallucinations as \"seeing my mother,\" denied auditory hallucinations; pt endorses depression, primarily relating it to prolonged hospitalization; Pt affect flat and blunted;    Medical  Pt endorses no new medical complaints    Plan  Patient is under MI commitment in [facility] with Beau; referrals made; place and date TBD.      "

## 2022-03-15 PROCEDURE — 99231 SBSQ HOSP IP/OBS SF/LOW 25: CPT

## 2022-03-15 PROCEDURE — 250N000013 HC RX MED GY IP 250 OP 250 PS 637: Performed by: PSYCHIATRY & NEUROLOGY

## 2022-03-15 PROCEDURE — 124N000003 HC R&B MH SENIOR/ADOLESCENT

## 2022-03-15 RX ADMIN — HALOPERIDOL 1 MG: 1 TABLET ORAL at 19:58

## 2022-03-15 RX ADMIN — ASPIRIN 81 MG: 81 TABLET, COATED ORAL at 08:42

## 2022-03-15 RX ADMIN — GABAPENTIN 100 MG: 100 CAPSULE ORAL at 17:43

## 2022-03-15 RX ADMIN — LEVOTHYROXINE SODIUM 88 MCG: 0.09 TABLET ORAL at 08:42

## 2022-03-15 RX ADMIN — Medication 5 MG: at 20:02

## 2022-03-15 RX ADMIN — MEMANTINE 10 MG: 10 TABLET ORAL at 08:42

## 2022-03-15 RX ADMIN — GABAPENTIN 100 MG: 100 CAPSULE ORAL at 08:42

## 2022-03-15 RX ADMIN — DONEPEZIL HYDROCHLORIDE 10 MG: 10 TABLET ORAL at 19:57

## 2022-03-15 RX ADMIN — CLOZAPINE 200 MG: 100 TABLET ORAL at 19:57

## 2022-03-15 RX ADMIN — SALMETEROL XINAFOATE 1 PUFF: 50 POWDER, METERED ORAL; RESPIRATORY (INHALATION) at 19:57

## 2022-03-15 RX ADMIN — SALMETEROL XINAFOATE 1 PUFF: 50 POWDER, METERED ORAL; RESPIRATORY (INHALATION) at 08:47

## 2022-03-15 RX ADMIN — MEMANTINE 10 MG: 10 TABLET ORAL at 19:57

## 2022-03-15 RX ADMIN — MEGESTROL ACETATE 20 MG: 20 TABLET ORAL at 08:41

## 2022-03-15 RX ADMIN — GABAPENTIN 100 MG: 100 CAPSULE ORAL at 13:04

## 2022-03-15 RX ADMIN — MIRTAZAPINE 15 MG: 15 TABLET, FILM COATED ORAL at 19:57

## 2022-03-15 RX ADMIN — BUSPIRONE HYDROCHLORIDE 30 MG: 15 TABLET ORAL at 08:42

## 2022-03-15 RX ADMIN — BUSPIRONE HYDROCHLORIDE 30 MG: 15 TABLET ORAL at 19:57

## 2022-03-15 ASSESSMENT — ACTIVITIES OF DAILY LIVING (ADL)
LAUNDRY: UNABLE TO COMPLETE
ORAL_HYGIENE: INDEPENDENT
LAUNDRY: UNABLE TO COMPLETE
ORAL_HYGIENE: INDEPENDENT
HYGIENE/GROOMING: INDEPENDENT
DRESS: SCRUBS (BEHAVIORAL HEALTH)
DRESS: SCRUBS (BEHAVIORAL HEALTH);INDEPENDENT
HYGIENE/GROOMING: INDEPENDENT

## 2022-03-15 NOTE — PROGRESS NOTES
03/14/22 1336   Individualization/Patient Specific Goals   Patient Vulnerabilities limited social skills;limited support system;lacks insight into illness   Interprofessional Rounds   Summary Patient continues to isolate in room, but is in milieu at times. Patient was accepted to [facility] pending guardianship approval.   Participants advanced practice nurse;occupational therapy;social work;nursing   Team Discussion   Participants MAIA Vega Rose, JENNIFER, YAHAIRA Singh, Joi Stearns, OT.   Progress patient is at base line   Anticipated length of stay 3-5 weeks pending guardianship approval   Continued Stay Criteria/Rationale guardianship not established.   Plan Discharge to [facility] once guardianship is established.   Rationale for change in precautions or plan No change.   Anticipated Discharge Disposition assisted living;nursing/skilled nursing care;basic nursing care/long-term care;group home

## 2022-03-15 NOTE — CARE PLAN
"Assessment/Intervention/Current Symtoms and Care Coordination:  -Reviewed pt's record to get up to date with his current status and plan  -Rounded with team  - Pt is showing improvements in terms of ambulation and eating. Pt comes out and eats his food in the milieu. He has been noticed to ask questions related to when he will be discharging from the hospital.  Hygine continues to be a challenge. Patient did shower today.   -Referral status: none today    CTC emailed Kurtis Chapin (CM) and Celia Layne ([facility]ices assessment)- notified them that the last update I received regarding the status of the guardian petition was from march 4th, 2022 and it stated \"petition is getting ready to be filed and submitted to court. Will await court hearing to appoint LLS in time frame of 3-5 weeks\"     Discharge Plan or Goal:  Discharge to [facility] homes when guardianship have been established.  LSS is working on streamlining guardianship        Barriers to Discharge:  History of aggression and elopement.  Lack of current guardian to make health decisions.   Several referrals made without acceptance      Referral Status:  None today  "

## 2022-03-15 NOTE — PLAN OF CARE
Problem: Decreased Participation and Engagement (Psychotic Signs/Symptoms)  Goal: Increased Participation and Engagement (Psychotic Signs/Symptoms)  Outcome: Ongoing, Progressing   Goal Outcome Evaluation:          Slept well for 8.5 hours tonight  Was observed awake once this shift but pt did not engage with staff,  Repositions self in bed .  No behavioral concerns noted

## 2022-03-15 NOTE — PLAN OF CARE
"Assessment/Intervention/Current Symtoms and Care Coordination:  -Reviewed pt's record to get up to date with his current status and plan  -Rounded with team  - Pt is showing improvements in terms of ambulation and eating. Pt comes out and eats his food in the milieu. He has been noticed to ask questions related to when he will be discharging from the hospital.  Hygine continues to be a challenge.  -Referral status: none today    CTC received the following email from Kurtis CHASE\"   _______________________________________________________________________  Kurtis Chapin <[email or web address]>  Mon 3/14/2022 8:25 AM    Rao Chavezo,    I m responding to the voicemail you left me on Friday regarding John Juárez so that [facility]  could also be included in the conversation.    John had his [facility] Assessment on 3/1 with Meaghan Layne from [facility]. My understanding was that Meaghan was going to reach out to [place] to coordinate regarding the status of John obtaining guardianship. It sounded like she couldn t move forward with finalizing the assessment until there was a guardian in place. [facility] is willing to accept John but wants to have a guardian in place.    Do you have any updates on the guardianship process with LSS     VALERI Etienne,    (direct)[phone] (fax) [phone] (cell) [phone]  __________________________________________________________________________  Meaghan Layne [email or web address]>  Mon 3/14/2022 2:08 PM  Hello - I ve been advised that the [facility] is on hold until guardianship has been established. At that time I will need he guardian to sign and complete the [facility] documents. Thank you  __________________________________________________________________________    Discharge Plan or Goal:  Discharge to [facility] when guardianship have been established.  LSS is working on streamlining guardianship       Barriers to " Discharge:  History of aggression and elopement.  Lack of current guardian to make health decisions.   Several referrals made without acceptance      Referral Status:  None today

## 2022-03-15 NOTE — PROGRESS NOTES
[facility], [place]   Psychiatric Progress Note  Hospital Day: 48        Interim History:   The patient's care was discussed with the treatment team during the daily team meeting and staff's chart notes were reviewed. Patient seen by MAIA Davila CNP  Staff report patient slept well for 8 hours.     Upon interview, the patient stated his mood was tired. Patient asked to be reoriented to today's date and wanted to know when he was getting out of here. Patient declined to take a shower, stating he was tired, agreed to cleaning himself with a washcloth. No other concerns or symptoms endorsed by patient today.     Suicidal ideation: denies current or recent suicidal ideation or behaviors.    Homicidal ideation: denies current or recent homicidal ideation or behaviors.    Psychotic symptoms: Patient denies AH, VH, paranoia, delusions.            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  He was hospitalized at [facility] for approximately one year and then transferred to station 3B mental health unit for ECT treatment where he was hospitalized 6/30/21 - 1/13/22 until it was discovered that he was COVID-19 positive. He was subsequently transferred to medicine service until 1/19/22 and then discharged to [facility] COVID-19 unit until medically stable. He was readmitted to geriatric unit, station 3B, on 1/26/22. He is currently under commitment with Yanez being amended to include Clozaril.   Patient has been on Zyprexa, Haldol, Risperdal that were not effective. He is  confused, disoriented and endorses visual hallucinations, however he denied auditory hallucinations. He is oriented to self only. Patient is unable to take care of himself in the community and the plan will be for the patient to have a guardian moving forward. Inpatient psychiatric hospitalization is warranted at this time for safety, stabilization, and possible adjustment in medications.     Target psychiatric symptoms and interventions:   1. Continue PTA medications, including:     BuSpar 30 mg 2 times daily  Clozaril 200 mg at bedtime  Aricept 10 mg at bedtime  Gabapentin 100 mg 3 times a day  Haldol 1 mg at bedtime  Namenda 10 mg 2 times a day  Remeron 15 mg at bedtime     Continue hydroxyzine 25 mg q4h prn for acute anxiety  Continue risperidone 1 mg q6h prn for severe agitation  Continue melatonin 5mg at bedtime.     2. Medicine following elevated LFTs. Statin, APAP and Trazodone held.       3. Guardianship in process. Admission to group home. pending finalization of guardianship.      4. Working with treatment team, director of nursing to look into possibility of letting patient go outside to garden with staff/security.  Last Vitamin D level was 99 on 11/4/21, however patient is no longer getting supplement. Ordered Vitamin D level to CBC lab scheduled for every 14 days. Pharmacy also recommended rechecking LFT's ordered yesterday.      5. Continue Status 15 minute checks.      Disposition Plan   Reason for ongoing admission: is unable to care for self due to severe psychosis or mariusz  Discharge location: [facility]  Discharge Medications: not ordered  Follow-up Appointments: not scheduled  Legal Status: full commitment         Medications:       aspirin  81 mg Oral Daily     [Held by provider] atorvastatin  80 mg Oral At Bedtime     busPIRone HCl  30 mg Oral BID     cloZAPine  200 mg Oral At Bedtime     donepezil  10 mg Oral At Bedtime     gabapentin  100 mg Oral TID w/meals     haloperidol  1 mg Oral At Bedtime  "    levothyroxine  88 mcg Oral Daily     megestrol  20 mg Oral Daily     melatonin  5 mg Oral At Bedtime     memantine  10 mg Oral BID     mirtazapine  15 mg Oral At Bedtime     [Held by provider] polyethylene glycol  17 g Oral BID     salmeterol  1 puff Inhalation BID            Psychiatric Examination:     /87 (BP Location: Right arm, Patient Position: Supine, Cuff Size: Adult Regular)   Pulse 102   Temp (!) 96.7  F (35.9  C) (Temporal)   Resp 18   Ht 1.778 m (5' 10\")   Wt 76.1 kg (167 lb 11.2 oz)   SpO2 96%   BMI 24.06 kg/m    Weight is 167 lbs 11.2 oz  Body mass index is 24.06 kg/m .  Orthostatic Vitals  Report    None            Appearance: dressed in hospital scrubs and poorly groomed  Attitude:  somewhat cooperative  Eye Contact:  poor   Mood:  tired  Affect:  intensity is blunted  Speech:  clear, coherent  Psychomotor Behavior:  no evidence of tardive dyskinesia, dystonia, or tics  Thought Process:  concrete  Associations:  no loose associations  Thought Content:  no evidence of suicidal ideation or homicidal ideation and no evidence of psychotic thought  Insight:  limited  Judgement:  limited  Oriented to:  time, person, and place  Attention Span and Concentration:  limited  Recent and Remote Memory:  limited           Allergies:     Allergies   Allergen Reactions     Ibuprofen      Latex           Labs:   No results found for this or any previous visit (from the past 24 hour(s)).         Precautions:     Behavioral Orders   Procedures     Code 1 - Restrict to Unit     Code 2     For abdominal US     Fall precautions     Routine Programming     As clinically indicated     Status 15     Every 15 minutes.       Entered by: Barbara Albrecht on March 15, 2022 at 1:00 PM              "

## 2022-03-15 NOTE — PLAN OF CARE
"Goal Outcome Evaluation:    Plan of Care Reviewed With: patient     Patient isolates mostly to his room but comes out for meals and briefly to watch TV or get a snack.  When asked if he has depression or anxiety, patient responds \"a little\".  When asked if he has hallucinations the patient responds \"I can communicate with people without talking out loud to them\".  Continues to deny injury from the fall earlier today.  No further concerns.  P:  Continue same plan of care.              "

## 2022-03-15 NOTE — CARE PLAN
03/14/22 8961   Team Discussion   Participants MAIA Vega, Becky, RN, Casie Jones, [facility], Joi Stearns, OT.   Progress patient is at base line   Anticipated length of stay 3-5 weeks pending guardianship approval   Continued Stay Criteria/Rationale guardianship not established.   Plan Discharge to [facility] once guardianship is established.   Rationale for change in precautions or plan No change.

## 2022-03-15 NOTE — PLAN OF CARE
"Goal Outcome Evaluation:    Plan of Care Reviewed With: patient      /87 (BP Location: Right arm, Patient Position: Supine, Cuff Size: Adult Regular)   Pulse 102   Temp (!) 96.7  F (35.9  C) (Temporal)   Resp 18   Ht 1.778 m (5' 10\")   Wt 76.1 kg (167 lb 11.2 oz)   SpO2 96%   BMI 24.06 kg/m    Patient had a shower this shift, patient was found sitting on the floor in the shower room applying his pants on. Patient states he slid but did not hit his head.Denies pain, dizziness or light headed. This was not a witnessed fall.  Writer assessed patient denies pain, neuro's  and CMS intact. Patient reported numbness in lower Bilat LE but not new.Skin is intact    Patient was assisted to the dinning to eat  and walked back to his room with supervision without difficulty.  NP Barbara, notified. Will continue to monitor and update changes.   Interventions:Stay with patient during a shower.      "

## 2022-03-16 PROCEDURE — 99231 SBSQ HOSP IP/OBS SF/LOW 25: CPT

## 2022-03-16 PROCEDURE — 250N000013 HC RX MED GY IP 250 OP 250 PS 637: Performed by: PSYCHIATRY & NEUROLOGY

## 2022-03-16 PROCEDURE — 124N000003 HC R&B MH SENIOR/ADOLESCENT

## 2022-03-16 RX ADMIN — MEGESTROL ACETATE 20 MG: 20 TABLET ORAL at 08:48

## 2022-03-16 RX ADMIN — MIRTAZAPINE 15 MG: 15 TABLET, FILM COATED ORAL at 20:22

## 2022-03-16 RX ADMIN — DONEPEZIL HYDROCHLORIDE 10 MG: 10 TABLET ORAL at 20:22

## 2022-03-16 RX ADMIN — SALMETEROL XINAFOATE 1 PUFF: 50 POWDER, METERED ORAL; RESPIRATORY (INHALATION) at 09:02

## 2022-03-16 RX ADMIN — MEMANTINE 10 MG: 10 TABLET ORAL at 08:49

## 2022-03-16 RX ADMIN — ASPIRIN 81 MG: 81 TABLET, COATED ORAL at 08:49

## 2022-03-16 RX ADMIN — BUSPIRONE HYDROCHLORIDE 30 MG: 15 TABLET ORAL at 20:22

## 2022-03-16 RX ADMIN — SALMETEROL XINAFOATE 1 PUFF: 50 POWDER, METERED ORAL; RESPIRATORY (INHALATION) at 20:22

## 2022-03-16 RX ADMIN — CLOZAPINE 200 MG: 100 TABLET ORAL at 20:22

## 2022-03-16 RX ADMIN — Medication 5 MG: at 20:22

## 2022-03-16 RX ADMIN — GABAPENTIN 100 MG: 100 CAPSULE ORAL at 08:49

## 2022-03-16 RX ADMIN — HALOPERIDOL 1 MG: 1 TABLET ORAL at 20:22

## 2022-03-16 RX ADMIN — GABAPENTIN 100 MG: 100 CAPSULE ORAL at 17:27

## 2022-03-16 RX ADMIN — MEMANTINE 10 MG: 10 TABLET ORAL at 20:22

## 2022-03-16 RX ADMIN — LEVOTHYROXINE SODIUM 88 MCG: 0.09 TABLET ORAL at 08:49

## 2022-03-16 RX ADMIN — GABAPENTIN 100 MG: 100 CAPSULE ORAL at 12:21

## 2022-03-16 RX ADMIN — BUSPIRONE HYDROCHLORIDE 30 MG: 15 TABLET ORAL at 08:48

## 2022-03-16 ASSESSMENT — ACTIVITIES OF DAILY LIVING (ADL)
ORAL_HYGIENE: PROMPTS;INDEPENDENT
DRESS: SCRUBS (BEHAVIORAL HEALTH)
HYGIENE/GROOMING: PROMPTS;INDEPENDENT
LAUNDRY: UNABLE TO COMPLETE
DRESS: INDEPENDENT;SCRUBS (BEHAVIORAL HEALTH)
ORAL_HYGIENE: PROMPTS
HYGIENE/GROOMING: PROMPTS

## 2022-03-16 NOTE — PLAN OF CARE
Problem: Behavior Regulation Impairment (Psychotic Signs/Symptoms)  Goal: Improved Behavioral Control (Psychotic Signs/Symptoms)  Outcome: Ongoing, Progressing   Goal Outcome Evaluation:    Slept well for 9.5 hours  Repositions self in bed  Able to make needs known

## 2022-03-16 NOTE — PLAN OF CARE
"Goal Outcome Evaluation:    Plan of Care Reviewed With: patient      Shift update: John says he feels better after the shower and he appears much less disheveled. He said he became a little weak in the shower and slid down to sit down, and next time a chair might be helpful (unsure if there was already one present yesterday). He mood is \"fine\". Heeat both meals in lounge and ate well, then went back to bed. Med compliant.     Mood/Affect: \"fine\" calm/cooperative    SI: no   SIB: no     Hallucinations/Psychosis: denies               "

## 2022-03-16 NOTE — PROGRESS NOTES
"[facility], [place]   Psychiatric Progress Note  Hospital Day: 49        Interim History:   The patient's care was discussed with the treatment team during the daily team meeting and staff's chart notes were reviewed. Patient seen by MAIA Davila CNP  Staff report patient slept well. Nursing staff reported patient was able to take a shower. Of note, patient declines shower when asked by female staff but is agreeable when male staff assist.     Upon interview, the patient states his mood is \"alright\" and is slightly perseverative on \"why am I still here?\"  Patient did not bring up any questions or concerns when asked. Looks less disheveled after showering.     Suicidal ideation: denies current or recent suicidal ideation or behaviors.    Homicidal ideation: denies current or recent homicidal ideation or behaviors.    Psychotic symptoms: Patient denies AH, VH, paranoia, delusions.            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  He was hospitalized at [facility] for approximately one year and then transferred to [facility] mental health unit for ECT treatment where he was hospitalized 6/30/21 - 1/13/22 until it was discovered that he was COVID-19 positive. He was subsequently transferred to medicine service until 1/19/22 and then discharged to [facility] COVID-19 unit until medically stable. He was readmitted to geriatric unit, station 3B, on 1/26/22. He is currently under commitment with Yanez being amended to include Clozaril.   Patient has been on Zyprexa, " Haldol, Risperdal that were not effective. He is confused, disoriented and endorses visual hallucinations, however he denied auditory hallucinations. He is oriented to self only. Patient is unable to take care of himself in the community and the plan will be for the patient to have a guardian moving forward. Inpatient psychiatric hospitalization is warranted at this time for safety, stabilization, and possible adjustment in medications.     Target psychiatric symptoms and interventions:   1. Continue PTA medications, including:     BuSpar 30 mg 2 times daily  Clozaril 200 mg at bedtime  Aricept 10 mg at bedtime  Gabapentin 100 mg 3 times a day  Haldol 1 mg at bedtime  Namenda 10 mg 2 times a day  Remeron 15 mg at bedtime     Continue hydroxyzine 25 mg q4h prn for acute anxiety  Continue risperidone 1 mg q6h prn for severe agitation  Continue melatonin 5mg at bedtime.     2. Medicine following elevated LFTs. Statin, APAP and Trazodone held.       3. Guardianship in process. Admission to group home. pending finalization of guardianship.      4. Working with treatment team, director of nursing to look into possibility of letting patient go outside to garden with staff/security.  Last Vitamin D level was 99 on 11/4/21, however patient is no longer getting supplemental Vitamin D. Ordered Vitamin D level to CBC lab scheduled for every 14 days. Pharmacy also recommended rechecking LFT's ordered two days ago.      5. Continue Status 15 minute checks.      Disposition Plan   Reason for ongoing admission: is unable to care for self due to severe psychosis or mariusz  Discharge location: [place]  Discharge Medications: not ordered  Follow-up Appointments: not scheduled  Legal Status: full commitment         Medications:       aspirin  81 mg Oral Daily     [Held by provider] atorvastatin  80 mg Oral At Bedtime     busPIRone HCl  30 mg Oral BID     cloZAPine  200 mg Oral At Bedtime     donepezil  10 mg Oral At Bedtime     gabapentin   "100 mg Oral TID w/meals     haloperidol  1 mg Oral At Bedtime     levothyroxine  88 mcg Oral Daily     megestrol  20 mg Oral Daily     melatonin  5 mg Oral At Bedtime     memantine  10 mg Oral BID     mirtazapine  15 mg Oral At Bedtime     [Held by provider] polyethylene glycol  17 g Oral BID     salmeterol  1 puff Inhalation BID            Psychiatric Examination:     /80   Pulse 110   Temp 98.1  F (36.7  C)   Resp 18   Ht 1.778 m (5' 10\")   Wt 76.1 kg (167 lb 11.2 oz)   SpO2 99%   BMI 24.06 kg/m    Weight is 167 lbs 11.2 oz  Body mass index is 24.06 kg/m .  Orthostatic Vitals  Report    None        Appearance: awake, alert and adequately groomed  Attitude:  somewhat cooperative  Eye Contact:  poor   Mood:  depressed  Affect:  appropriate and in normal range and mood congruent  Speech:  mumbling  Psychomotor Behavior:  no evidence of tardive dyskinesia, dystonia, or tics  Thought Process:  logical, linear and concrete  Associations:  no loose associations  Thought Content:  no evidence of suicidal ideation or homicidal ideation and no evidence of psychotic thought  Insight:  poor  Judgement:  poor  Oriented to:  time, person, and place  Attention Span and Concentration:  limited  Recent and Remote Memory:  poor           Allergies:     Allergies   Allergen Reactions     Ibuprofen      Latex           Labs:   No results found for this or any previous visit (from the past 24 hour(s)).         Precautions:     Behavioral Orders   Procedures     Code 1 - Restrict to Unit     Code 2     For abdominal US     Fall precautions     Routine Programming     As clinically indicated     Status 15     Every 15 minutes.       Entered by: Barbara Albrecht on March 16, 2022 at 9:51 AM              "

## 2022-03-17 PROCEDURE — 124N000003 HC R&B MH SENIOR/ADOLESCENT

## 2022-03-17 PROCEDURE — 99231 SBSQ HOSP IP/OBS SF/LOW 25: CPT

## 2022-03-17 PROCEDURE — 250N000013 HC RX MED GY IP 250 OP 250 PS 637: Performed by: PSYCHIATRY & NEUROLOGY

## 2022-03-17 RX ADMIN — MEGESTROL ACETATE 20 MG: 20 TABLET ORAL at 08:43

## 2022-03-17 RX ADMIN — SALMETEROL XINAFOATE 1 PUFF: 50 POWDER, METERED ORAL; RESPIRATORY (INHALATION) at 08:44

## 2022-03-17 RX ADMIN — Medication 5 MG: at 20:55

## 2022-03-17 RX ADMIN — CLOZAPINE 200 MG: 100 TABLET ORAL at 20:55

## 2022-03-17 RX ADMIN — DONEPEZIL HYDROCHLORIDE 10 MG: 10 TABLET ORAL at 20:55

## 2022-03-17 RX ADMIN — BUSPIRONE HYDROCHLORIDE 30 MG: 15 TABLET ORAL at 08:43

## 2022-03-17 RX ADMIN — ASPIRIN 81 MG: 81 TABLET, COATED ORAL at 08:43

## 2022-03-17 RX ADMIN — MIRTAZAPINE 15 MG: 15 TABLET, FILM COATED ORAL at 20:54

## 2022-03-17 RX ADMIN — LEVOTHYROXINE SODIUM 88 MCG: 0.09 TABLET ORAL at 08:43

## 2022-03-17 RX ADMIN — GABAPENTIN 100 MG: 100 CAPSULE ORAL at 17:06

## 2022-03-17 RX ADMIN — SALMETEROL XINAFOATE 1 PUFF: 50 POWDER, METERED ORAL; RESPIRATORY (INHALATION) at 20:55

## 2022-03-17 RX ADMIN — BUSPIRONE HYDROCHLORIDE 30 MG: 15 TABLET ORAL at 20:54

## 2022-03-17 RX ADMIN — MEMANTINE 10 MG: 10 TABLET ORAL at 08:43

## 2022-03-17 RX ADMIN — GABAPENTIN 100 MG: 100 CAPSULE ORAL at 12:32

## 2022-03-17 RX ADMIN — MEMANTINE 10 MG: 10 TABLET ORAL at 20:54

## 2022-03-17 RX ADMIN — HALOPERIDOL 1 MG: 1 TABLET ORAL at 20:54

## 2022-03-17 RX ADMIN — GABAPENTIN 100 MG: 100 CAPSULE ORAL at 08:43

## 2022-03-17 ASSESSMENT — ACTIVITIES OF DAILY LIVING (ADL)
LAUNDRY: UNABLE TO COMPLETE
ORAL_HYGIENE: PROMPTS;INDEPENDENT
DRESS: SCRUBS (BEHAVIORAL HEALTH);INDEPENDENT
HYGIENE/GROOMING: PROMPTS;INDEPENDENT

## 2022-03-17 NOTE — PLAN OF CARE
Problem: Behavior Regulation Impairment (Psychotic Signs/Symptoms)  Goal: Improved Behavioral Control (Psychotic Signs/Symptoms)  Outcome: Ongoing, Progressing   Goal Outcome Evaluation:           Slept well for 9 hours  No concerns raised this shift  Independent in repositioning in bed.

## 2022-03-17 NOTE — PROGRESS NOTES
"[facility], [place]   Psychiatric Progress Note  Hospital Day: 50        Interim History:   The patient's care was discussed with the treatment team during the daily team meeting and staff's chart notes were reviewed. Patient seen by MAIA Davila CNP  Staff report patient slept well for 9 hours and states he feels much better after his shower. Patient did feel weak during his shower and assisted himself to the ground, no injuries.     Upon interview, the patient states his mood is \"alright.\" No psychiatric or medical concerns noted.     Suicidal ideation: denies current or recent suicidal ideation or behaviors.    Homicidal ideation: denies current or recent homicidal ideation or behaviors.    Psychotic symptoms: When asked about psychotic symptoms, patient stated something about his  Mother, but was unable to expand.          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  He was hospitalized at [facility] for approximately one year and then transferred to station 3[facility] mental health unit for ECT treatment where he was hospitalized 6/30/21 - 1/13/22 until it was discovered that he was COVID-19 positive. He was subsequently transferred to medicine service until 1/19/22 and then discharged to [place]'s COVID-19 unit until medically stable. He was readmitted to geriatric unit, station 3B, on 1/26/22. He is currently under commitment with Yanez being amended to include Clozaril.   Patient has been on Zyprexa, Haldol, Risperdal that were not effective. He is confused, " disoriented and endorses visual hallucinations, however he denied auditory hallucinations. He is oriented to self only. Patient is unable to take care of himself in the community and the plan will be for the patient to have a guardian moving forward. Inpatient psychiatric hospitalization is warranted at this time for safety, stabilization, and possible adjustment in medications.     Target psychiatric symptoms and interventions:   1. Continue PTA medications, including:     BuSpar 30 mg 2 times daily  Clozaril 200 mg at bedtime  Aricept 10 mg at bedtime  Gabapentin 100 mg 3 times a day  Haldol 1 mg at bedtime  Namenda 10 mg 2 times a day  Remeron 15 mg at bedtime     Continue hydroxyzine 25 mg q4h prn for acute anxiety  Continue risperidone 1 mg q6h prn for severe agitation  Continue melatonin 5mg at bedtime.     2. Medicine following elevated LFTs. Statin, APAP and Trazodone held.       3. Guardianship in process. Admission to group home. pending finalization of guardianship.      4. Working with treatment team, director of nursing to look into possibility of letting patient go outside to garden with staff/security.  Last Vitamin D level was 99 on 11/4/21, however patient is no longer getting supplemental Vitamin D. Ordered Vitamin D level to CBC lab scheduled for every 14 days. Pharmacy also recommended rechecking LFT's ordered three days ago.      5. Continue Status 15 minute checks.      Disposition Plan   Reason for ongoing admission: is unable to care for self due to severe psychosis or mariusz  Discharge location: [place]  Discharge Medications: not ordered  Follow-up Appointments: not scheduled  Legal Status: full commitment         Medications:       aspirin  81 mg Oral Daily     [Held by provider] atorvastatin  80 mg Oral At Bedtime     busPIRone HCl  30 mg Oral BID     cloZAPine  200 mg Oral At Bedtime     donepezil  10 mg Oral At Bedtime     gabapentin  100 mg Oral TID w/meals     haloperidol  1 mg Oral At  "Bedtime     levothyroxine  88 mcg Oral Daily     megestrol  20 mg Oral Daily     melatonin  5 mg Oral At Bedtime     memantine  10 mg Oral BID     mirtazapine  15 mg Oral At Bedtime     [Held by provider] polyethylene glycol  17 g Oral BID     salmeterol  1 puff Inhalation BID            Psychiatric Examination:     /88 (BP Location: Right arm, Patient Position: Supine)   Pulse 71   Temp 98.8  F (37.1  C) (Oral)   Resp 16   Ht 1.778 m (5' 10\")   Wt 76.1 kg (167 lb 11.2 oz)   SpO2 100%   BMI 24.06 kg/m    Weight is 167 lbs 11.2 oz  Body mass index is 24.06 kg/m .  Orthostatic Vitals  Report    None          Appearance: awake, alert, adequately groomed and dressed in hospital scrubs  Attitude:  somewhat cooperative  Eye Contact:  poor   Mood:  \"alright\"  Affect:  intensity is blunted  Speech:  mumbling  Psychomotor Behavior:  no evidence of tardive dyskinesia, dystonia, or tics  Thought Process:  logical and concrete  Associations:  no loose associations  Thought Content:  no evidence of suicidal ideation or homicidal ideation  Insight:  partial  Judgement:  poor  Oriented to:  time, person, and place  Attention Span and Concentration:  limited  Recent and Remote Memory:  poor           Allergies:     Allergies   Allergen Reactions     Ibuprofen      Latex           Labs:   No results found for this or any previous visit (from the past 24 hour(s)).         Precautions:     Behavioral Orders   Procedures     Code 1 - Restrict to Unit     Code 2     For abdominal US     Fall precautions     Routine Programming     As clinically indicated     Status 15     Every 15 minutes.       Entered by: Barbara Albrecht on March 17, 2022 at 10:46 AM              "

## 2022-03-17 NOTE — PLAN OF CARE
Assessment/Intervention/Current Symtoms and Care Coordination:  -Reviewed pt's record to get up to date with his current status and plan  -Rounded with team     Discharge Plan or Goal:  Discharge to [facility] when guardianship have been established.  LSS is working on streamlining guardianship     Barriers to Discharge:  History of aggression and elopement.  Lack of current guardian to make health decisions.   Several referrals made without acceptance      Referral Status:  None today

## 2022-03-17 NOTE — PLAN OF CARE
Assessment/Intervention/Current Symtoms and Care Coordination:  -Reviewed pt's record to get up to date with his current status and plan  -Rounded with team  - Pt is showing improvements in terms of ambulation and eating. Pt comes out and eats his food in the milieu. He has been noticed to ask questions related to when he will be discharging from the hospital.       Discharge Plan or Goal:  Discharge to [facility] when guardianship have been established.  LSS is working on streamlining guardianship        Barriers to Discharge:  History of aggression and elopement.  Lack of current guardian to make health decisions.   Several referrals made without acceptance      Referral Status:  None today

## 2022-03-17 NOTE — PLAN OF CARE
"John had a shift fairly consistent with prior. He was in his room primarily resting all shift, sleeping on and off. He was out of his room and ate meals in the lounge. He would be prompted that lunch was here, but would not walk out to the lounge unless he was escorted by staff.     His affect was blunted, flat, guarded, one word responses. Speech was soft and at times difficult to understand. When asked about his mood, how he feels, stated \"fine\", but did not expand. He stated that he was leaving [facility], either to [facility] or [facility]. He was informed that he was hopefully leaving soon, but not today.     He was offered the opportunity to shower, but declined.       Problem: Cognitive Impairment (Psychotic Signs/Symptoms)  Goal: Optimal Cognitive Function (Psychotic Signs/Symptoms)  Outcome: Ongoing, Progressing   Goal Outcome Evaluation:                      "

## 2022-03-17 NOTE — PLAN OF CARE
"Goal Outcome Evaluation:    Plan of Care Reviewed With: patient     Patient has been isolative and withdrawn. His affect is flat. His mood is calm. He denies SI and SIB. He denies AH and VH. He denies depression. He endorses anxiety stating \"I just wanna get out of this fucking place\". He did not attend groups. He came out of his room for dinner only. He ate 100%. He is not social with peers and staff. He is alert to self only. He is medication compliant.                 "

## 2022-03-17 NOTE — PLAN OF CARE
03/17/22 1516   Group Therapy Session   Group Attendance attended group session   Total Time patient participated (minutes) 10   Total # Attendees 7   Group Type   (Occupational Therapy)   Group Topic Covered cognitive therapy techniques;coping skills/lifestyle management;problem-solving         Attended this first OT group with this author. Attended this afternoon OT group with spontaneous entrance to group though stayed for only 10 minutes with 7 pts, focused on understanding of things that are able to be controled and things that are not,and relating it to different types of anxiety thinking. Pt Response: he sat with the group. Offered one answer which was slightly out of context and a few minutes stated the need to leave the group and did not return.  Will encourage attendance to groups and support all efforts.

## 2022-03-18 LAB
ALBUMIN SERPL-MCNC: 3.2 G/DL (ref 3.4–5)
ALP SERPL-CCNC: 79 U/L (ref 40–150)
ALT SERPL W P-5'-P-CCNC: 112 U/L (ref 0–70)
AST SERPL W P-5'-P-CCNC: 143 U/L (ref 0–45)
BASOPHILS # BLD AUTO: 0.1 10E3/UL (ref 0–0.2)
BASOPHILS NFR BLD AUTO: 1 %
BILIRUB DIRECT SERPL-MCNC: 0.1 MG/DL (ref 0–0.2)
BILIRUB SERPL-MCNC: 0.4 MG/DL (ref 0.2–1.3)
DEPRECATED CALCIDIOL+CALCIFEROL SERPL-MC: 25 UG/L (ref 20–75)
EOSINOPHIL # BLD AUTO: 1.4 10E3/UL (ref 0–0.7)
EOSINOPHIL NFR BLD AUTO: 23 %
ERYTHROCYTE [DISTWIDTH] IN BLOOD BY AUTOMATED COUNT: 14.4 % (ref 10–15)
HCT VFR BLD AUTO: 42.2 % (ref 40–53)
HGB BLD-MCNC: 14.9 G/DL (ref 13.3–17.7)
IMM GRANULOCYTES # BLD: 0 10E3/UL
IMM GRANULOCYTES NFR BLD: 1 %
LYMPHOCYTES # BLD AUTO: 1.8 10E3/UL (ref 0.8–5.3)
LYMPHOCYTES NFR BLD AUTO: 30 %
MCH RBC QN AUTO: 31.4 PG (ref 26.5–33)
MCHC RBC AUTO-ENTMCNC: 35.3 G/DL (ref 31.5–36.5)
MCV RBC AUTO: 89 FL (ref 78–100)
MONOCYTES # BLD AUTO: 0.6 10E3/UL (ref 0–1.3)
MONOCYTES NFR BLD AUTO: 10 %
NEUTROPHILS # BLD AUTO: 2.1 10E3/UL (ref 1.6–8.3)
NEUTROPHILS NFR BLD AUTO: 35 %
NRBC # BLD AUTO: 0 10E3/UL
NRBC BLD AUTO-RTO: 0 /100
PLATELET # BLD AUTO: 174 10E3/UL (ref 150–450)
PROT SERPL-MCNC: 7.4 G/DL (ref 6.8–8.8)
RBC # BLD AUTO: 4.75 10E6/UL (ref 4.4–5.9)
WBC # BLD AUTO: 6.1 10E3/UL (ref 4–11)

## 2022-03-18 PROCEDURE — 124N000003 HC R&B MH SENIOR/ADOLESCENT

## 2022-03-18 PROCEDURE — 85018 HEMOGLOBIN: CPT | Performed by: PSYCHIATRY & NEUROLOGY

## 2022-03-18 PROCEDURE — 250N000013 HC RX MED GY IP 250 OP 250 PS 637: Performed by: PSYCHIATRY & NEUROLOGY

## 2022-03-18 PROCEDURE — 82306 VITAMIN D 25 HYDROXY: CPT

## 2022-03-18 PROCEDURE — 36415 COLL VENOUS BLD VENIPUNCTURE: CPT

## 2022-03-18 PROCEDURE — 80076 HEPATIC FUNCTION PANEL: CPT

## 2022-03-18 PROCEDURE — 99231 SBSQ HOSP IP/OBS SF/LOW 25: CPT

## 2022-03-18 RX ADMIN — Medication 5 MG: at 20:40

## 2022-03-18 RX ADMIN — ASPIRIN 81 MG: 81 TABLET, COATED ORAL at 08:38

## 2022-03-18 RX ADMIN — MEMANTINE 10 MG: 10 TABLET ORAL at 08:38

## 2022-03-18 RX ADMIN — MIRTAZAPINE 15 MG: 15 TABLET, FILM COATED ORAL at 20:40

## 2022-03-18 RX ADMIN — GABAPENTIN 100 MG: 100 CAPSULE ORAL at 12:49

## 2022-03-18 RX ADMIN — SALMETEROL XINAFOATE 1 PUFF: 50 POWDER, METERED ORAL; RESPIRATORY (INHALATION) at 20:40

## 2022-03-18 RX ADMIN — BUSPIRONE HYDROCHLORIDE 30 MG: 15 TABLET ORAL at 20:40

## 2022-03-18 RX ADMIN — MEMANTINE 10 MG: 10 TABLET ORAL at 20:40

## 2022-03-18 RX ADMIN — CLOZAPINE 200 MG: 100 TABLET ORAL at 20:40

## 2022-03-18 RX ADMIN — GABAPENTIN 100 MG: 100 CAPSULE ORAL at 17:34

## 2022-03-18 RX ADMIN — LEVOTHYROXINE SODIUM 88 MCG: 0.09 TABLET ORAL at 08:38

## 2022-03-18 RX ADMIN — MEGESTROL ACETATE 20 MG: 20 TABLET ORAL at 08:38

## 2022-03-18 RX ADMIN — HALOPERIDOL 1 MG: 1 TABLET ORAL at 20:40

## 2022-03-18 RX ADMIN — BUSPIRONE HYDROCHLORIDE 30 MG: 15 TABLET ORAL at 08:37

## 2022-03-18 RX ADMIN — GABAPENTIN 100 MG: 100 CAPSULE ORAL at 08:38

## 2022-03-18 RX ADMIN — DONEPEZIL HYDROCHLORIDE 10 MG: 10 TABLET ORAL at 20:40

## 2022-03-18 ASSESSMENT — ACTIVITIES OF DAILY LIVING (ADL)
ORAL_HYGIENE: INDEPENDENT;PROMPTS
DRESS: SCRUBS (BEHAVIORAL HEALTH)
HYGIENE/GROOMING: INDEPENDENT;PROMPTS;WITH SUPERVISION
ORAL_HYGIENE: INDEPENDENT;PROMPTS
DRESS: INDEPENDENT;PROMPTS
HYGIENE/GROOMING: INDEPENDENT;PROMPTS

## 2022-03-18 NOTE — PROGRESS NOTES
"[facility], [place]   Psychiatric Progress Note  Hospital Day: 51        Interim History:   The patient's care was discussed with the treatment team during the daily team meeting and staff's chart notes were reviewed. Patient seen by MAIA Davila CNP  Staff report patient spent most of the shift in his room, slept for 8 hours. Patient is medication complaint.     Upon interview, the patient stated his mood is \"alright.\" OT reported patient came to group yesterday and participated. Slept okay. Asked provider what the date was -- patient was reoriented.     Suicidal ideation: denies current or recent suicidal ideation or behaviors.    Homicidal ideation: denies current or recent homicidal ideation or behaviors.    Psychotic symptoms:  Patient denies AH, VH, paranoia, delusions. However patient endorsed auditory hallucinations of his mother and sister to staff yesterday.            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  He was hospitalized at [facility] for approximately one year and then transferred to station 3B mental health unit for ECT treatment where he was hospitalized 6/30/21 - 1/13/22 until it was discovered that he was COVID-19 positive. He was subsequently transferred to medicine service until 1/19/22 and then discharged to [place] COVID-19 unit until medically stable. He was readmitted to geriatric unit, station 3B, on 1/26/22. He is currently under commitment with Yanez being amended to include Clozaril.   Patient has been on Zyprexa, Haldol, " Risperdal that were not effective. He is confused, disoriented and endorses visual hallucinations, however he denied auditory hallucinations. He is oriented to self only. Patient is unable to take care of himself in the community and the plan will be for the patient to have a guardian moving forward. Inpatient psychiatric hospitalization is warranted at this time for safety, stabilization, and possible adjustment in medications    Target psychiatric symptoms and interventions:   1. Continue PTA medications, including:     BuSpar 30 mg 2 times daily  Clozaril 200 mg at bedtime  Aricept 10 mg at bedtime  Gabapentin 100 mg 3 times a day  Haldol 1 mg at bedtime  Namenda 10 mg 2 times a day  Remeron 15 mg at bedtime     Continue hydroxyzine 25 mg q4h prn for acute anxiety  Continue risperidone 1 mg q6h prn for severe agitation  Continue melatonin 5mg at bedtime.    2. Medicine following elevated LFTs. Statin, APAP and Trazodone held.       3. Guardianship in process. Admission to group home. pending finalization of guardianship.     4. Patient had labs drawn today, plan to review results when they're back.     Disposition Plan   Reason for ongoing admission: is unable to care for self due to severe psychosis or mariusz  Discharge location: [place]  Discharge Medications: not ordered  Follow-up Appointments: not scheduled  Legal Status: full commitment              Medications:       aspirin  81 mg Oral Daily     [Held by provider] atorvastatin  80 mg Oral At Bedtime     busPIRone HCl  30 mg Oral BID     cloZAPine  200 mg Oral At Bedtime     donepezil  10 mg Oral At Bedtime     gabapentin  100 mg Oral TID w/meals     haloperidol  1 mg Oral At Bedtime     levothyroxine  88 mcg Oral Daily     megestrol  20 mg Oral Daily     melatonin  5 mg Oral At Bedtime     memantine  10 mg Oral BID     mirtazapine  15 mg Oral At Bedtime     [Held by provider] polyethylene glycol  17 g Oral BID     salmeterol  1 puff Inhalation BID             "Psychiatric Examination:     BP 92/65 (BP Location: Left arm, Patient Position: Supine)   Pulse 98   Temp 98.8  F (37.1  C) (Oral)   Resp 16   Ht 1.778 m (5' 10\")   Wt 76.1 kg (167 lb 11.2 oz)   SpO2 98%   BMI 24.06 kg/m    Weight is 167 lbs 11.2 oz  Body mass index is 24.06 kg/m .  Orthostatic Vitals  Report    None          Appearance: awake, alert and adequately groomed  Attitude:  somewhat cooperative  Eye Contact:  poor   Mood:  sad   Affect:  intensity is blunted  Speech:  mumbling  Psychomotor Behavior:  no evidence of tardive dyskinesia, dystonia, or tics  Thought Process:  logical  Associations:  no loose associations  Thought Content:  no evidence of suicidal ideation or homicidal ideation  Insight:  limited  Judgement:  poor  Oriented to:  person and place  Attention Span and Concentration:  limited  Recent and Remote Memory:  poor           Allergies:     Allergies   Allergen Reactions     Ibuprofen      Latex           Labs:     Recent Results (from the past 24 hour(s))   Hepatic panel    Collection Time: 03/18/22  7:01 AM   Result Value Ref Range    Bilirubin Total 0.4 0.2 - 1.3 mg/dL    Bilirubin Direct 0.1 0.0 - 0.2 mg/dL    Protein Total 7.4 6.8 - 8.8 g/dL    Albumin 3.2 (L) 3.4 - 5.0 g/dL    Alkaline Phosphatase 79 40 - 150 U/L     (H) 0 - 45 U/L     (H) 0 - 70 U/L   CBC with platelets and differential    Collection Time: 03/18/22  7:01 AM   Result Value Ref Range    WBC Count 6.1 4.0 - 11.0 10e3/uL    RBC Count 4.75 4.40 - 5.90 10e6/uL    Hemoglobin 14.9 13.3 - 17.7 g/dL    Hematocrit 42.2 40.0 - 53.0 %    MCV 89 78 - 100 fL    MCH 31.4 26.5 - 33.0 pg    MCHC 35.3 31.5 - 36.5 g/dL    RDW 14.4 10.0 - 15.0 %    Platelet Count 174 150 - 450 10e3/uL    % Neutrophils 35 %    % Lymphocytes 30 %    % Monocytes 10 %    % Eosinophils 23 %    % Basophils 1 %    % Immature Granulocytes 1 %    NRBCs per 100 WBC 0 <1 /100    Absolute Neutrophils 2.1 1.6 - 8.3 10e3/uL    Absolute " Lymphocytes 1.8 0.8 - 5.3 10e3/uL    Absolute Monocytes 0.6 0.0 - 1.3 10e3/uL    Absolute Eosinophils 1.4 (H) 0.0 - 0.7 10e3/uL    Absolute Basophils 0.1 0.0 - 0.2 10e3/uL    Absolute Immature Granulocytes 0.0 <=0.4 10e3/uL    Absolute NRBCs 0.0 10e3/uL            Precautions:     Behavioral Orders   Procedures     Code 1 - Restrict to Unit     Code 2     For abdominal US     Fall precautions     Routine Programming     As clinically indicated     Status 15     Every 15 minutes.       Entered by: Barbara Albrecht on March 18, 2022 at 8:57 AM

## 2022-03-18 NOTE — PROGRESS NOTES
03/18/22 1406   Group Therapy Session   Group Attendance other (see comments)  (Did not attend group)   Time Session Began 0100   Time Session Ended 0145   Total Time patient participated (minutes) 0   Total # Attendees 1   Group Type psychotherapeutic   Group Topic Covered cognitive therapy techniques   Patient Response Detail Pt did nto attend  the session,was invited but did not come

## 2022-03-18 NOTE — PLAN OF CARE
"  Problem: Behavior Regulation Impairment (Psychotic Signs/Symptoms)  Goal: Improved Behavioral Control (Psychotic Signs/Symptoms)  Outcome: Ongoing, Progressing   Goal Outcome Evaluation:    Plan of Care Reviewed With: patient               Nursing Assessment    Psychosis (H) [F29]    Admit Date: 1/26/2022    Length of Stay: 51    Patient evaluation continues. Assessed mood,anxiety,thoughts and behavior. Patient is  progressing towards goals. Patient is encouraged to participate in groups and assisted to develop healthy coping skills.  Patient denies auditory or visual hallucinations. BP 92/65 (BP Location: Left arm, Patient Position: Supine)   Pulse 98   Temp 98.8  F (37.1  C) (Oral)   Resp 16   Ht 1.778 m (5' 10\")   Wt 76.1 kg (167 lb 11.2 oz)   SpO2 98%   BMI 24.06 kg/m      Mood: \"ok\"    Patient reports depression none and reports anxiety little    Affect:flat blunted    Sleep: good 8-9 hours each night    Appetite: good     SI: denies    HI: denies    SIB: denies      Denies pain    Medication Compliance yes    Group participation: no    ADL's: encourage    Fall risk interventions: proper foot wear, orthostatic B/P    Rinku Score Interventions: none    Discharge planning in process    Refer to daily team meeting notes for individualized plan of care. Nursing will continue to assess.    *Scale is 1-10 and 10 is the worst.         "

## 2022-03-18 NOTE — PLAN OF CARE
Goal Outcome Evaluation:    Plan of Care Reviewed With: patient     Patient spent most of the shift in his room. [facility]. His affect is flat. His mood is calm. He denies SI and SIB. He denies anxiety and depression. He endorses AH hearing voices from his sister and mother. He denies VH. He ate 100% of his dinner. He is not social with peers and staff only when he is spoken to. He refused to shower. He refused to attend groups. He is medication compliant. He is alert to self only.

## 2022-03-18 NOTE — PLAN OF CARE
Problem: Sleep Disturbance (Psychotic Signs/Symptoms)  Goal: Improved Sleep (Psychotic Signs/Symptoms)  Outcome: Ongoing, Progressing   Goal Outcome Evaluation:             Slept well for 8 hours  No complaints/ concerns raised  Able to reposition self in bed

## 2022-03-19 PROCEDURE — 250N000013 HC RX MED GY IP 250 OP 250 PS 637: Performed by: PSYCHIATRY & NEUROLOGY

## 2022-03-19 PROCEDURE — 124N000003 HC R&B MH SENIOR/ADOLESCENT

## 2022-03-19 RX ADMIN — BUSPIRONE HYDROCHLORIDE 30 MG: 15 TABLET ORAL at 08:45

## 2022-03-19 RX ADMIN — SALMETEROL XINAFOATE 1 PUFF: 50 POWDER, METERED ORAL; RESPIRATORY (INHALATION) at 08:46

## 2022-03-19 RX ADMIN — HALOPERIDOL 1 MG: 1 TABLET ORAL at 20:59

## 2022-03-19 RX ADMIN — DONEPEZIL HYDROCHLORIDE 10 MG: 10 TABLET ORAL at 20:59

## 2022-03-19 RX ADMIN — LEVOTHYROXINE SODIUM 88 MCG: 0.09 TABLET ORAL at 08:45

## 2022-03-19 RX ADMIN — GABAPENTIN 100 MG: 100 CAPSULE ORAL at 08:45

## 2022-03-19 RX ADMIN — GABAPENTIN 100 MG: 100 CAPSULE ORAL at 12:19

## 2022-03-19 RX ADMIN — MEMANTINE 10 MG: 10 TABLET ORAL at 08:45

## 2022-03-19 RX ADMIN — SALMETEROL XINAFOATE 1 PUFF: 50 POWDER, METERED ORAL; RESPIRATORY (INHALATION) at 20:59

## 2022-03-19 RX ADMIN — BUSPIRONE HYDROCHLORIDE 30 MG: 15 TABLET ORAL at 20:59

## 2022-03-19 RX ADMIN — CLOZAPINE 200 MG: 100 TABLET ORAL at 20:59

## 2022-03-19 RX ADMIN — MEGESTROL ACETATE 20 MG: 20 TABLET ORAL at 08:46

## 2022-03-19 RX ADMIN — Medication 5 MG: at 20:59

## 2022-03-19 RX ADMIN — ASPIRIN 81 MG: 81 TABLET, COATED ORAL at 08:46

## 2022-03-19 RX ADMIN — MEMANTINE 10 MG: 10 TABLET ORAL at 20:59

## 2022-03-19 RX ADMIN — GABAPENTIN 100 MG: 100 CAPSULE ORAL at 17:43

## 2022-03-19 RX ADMIN — MIRTAZAPINE 15 MG: 15 TABLET, FILM COATED ORAL at 20:59

## 2022-03-19 ASSESSMENT — ACTIVITIES OF DAILY LIVING (ADL)
ORAL_HYGIENE: INDEPENDENT;PROMPTS
DRESS: INDEPENDENT;PROMPTS
HYGIENE/GROOMING: INDEPENDENT;PROMPTS

## 2022-03-19 NOTE — PLAN OF CARE
Patient has remained sleeping in his room for the entire shift.  No PRNs given.  10 hrs of sleep.

## 2022-03-19 NOTE — PLAN OF CARE
"  Problem: Behavior Regulation Impairment (Psychotic Signs/Symptoms)  Goal: Improved Behavioral Control (Psychotic Signs/Symptoms)  Outcome: Ongoing, Progressing   Goal Outcome Evaluation:    Plan of Care Reviewed With: patient               Nursing Assessment    Psychosis (H) [F29]    Admit Date: 1/26/2022    Length of Stay: 52    Patient evaluation continues. Assessed mood,anxiety,thoughts and behavior. Patient is  progressing towards goals and is ready for placement. Patient is encouraged to participate in groups and assisted to develop healthy coping skills.  Patient has auditory hallucinations. BP 95/68 (BP Location: Right arm, Patient Position: Sitting, Cuff Size: Adult Regular)   Pulse 92   Temp 98  F (36.7  C) (Oral)   Resp 16   Ht 1.778 m (5' 10\")   Wt 76.1 kg (167 lb 11.2 oz)   SpO2 99%   BMI 24.06 kg/m      Mood: ok    Patient reports depression as none and and reports anxiety none    Affect:flat and blunted    Sleep: 8-9 hours every night and naps during the day    Appetite: denies    SI: denies    HI: denies    SIB: denies      Medication Compliance yes    Group participation:none    ADL's: needs much encouragement    Fall risk interventions: proper foot wear, orthostatic B/P     Rinku Score Interventions: proper foot wear    Discharge planning in process    Refer to daily team meeting notes for individualized plan of care. Nursing will continue to assess.    *Scale is 1-10 and 10 is the worst.         "

## 2022-03-19 NOTE — PLAN OF CARE
Goal Outcome Evaluation:    Plan of Care Reviewed With: patient        Pt continues to isolate to room but comes out independently for dinner. Gait steady. Poor eye contact. Affect flat. Calm, cooperative. Med-compliant. Hygiene poor, declined to shower. Appetite good, ate nearly 100% of rice and meat on plate this evening. Denies depression, anxiety, SI, AH this evening. Continue with current treatment plan and recommendations. Continue to monitor and reassess symptoms. Monitor response to medications. Monitor progress towards treatment goals. Encourage groups and participation.

## 2022-03-19 NOTE — PLAN OF CARE
"  Problem: Behavior Regulation Impairment (Psychotic Signs/Symptoms)  Goal: Improved Behavioral Control (Psychotic Signs/Symptoms)  Outcome: Ongoing, Progressing   Goal Outcome Evaluation:    Plan of Care Reviewed With: patient           Nursing Assessment    Psychosis (H) [F29]    Admit Date: 1/26/2022    Length of Stay: 52    Patient evaluation continues. Assessed mood,anxiety,thoughts and behavior. Patient attended group this evening, Pt showered this am.progressing towards goals, Patient is encouraged to participate in groups and assisted to develop healthy coping skills.  Patient denies auditory or visual hallucinations. /71 (BP Location: Left arm, Patient Position: Sitting, Cuff Size: Adult Regular)   Pulse 117   Temp 97.9  F (36.6  C) (Oral)   Resp 16   Ht 1.778 m (5' 10\")   Wt 76.1 kg (167 lb 11.2 oz)   SpO2 97%   BMI 24.06 kg/m      Mood: reported as \"ok\"    Patient reports depression none and reports anxiety none    Affect: flat and blunted    Sleep: 8-9 hours each night and naps during the day    Appetite: good 100%    SI: denies    HI: denies    SIB: denies      Medication Compliance medication compliant    Group participation:yes attended    ADL's: encouraged, has been doing better    Fall risk interventions: proper foot wear, orthostatic B/P increase visualization of pt    Rinku Score Interventions: none    Discharge planning in process    Refer to daily team meeting notes for individualized plan of care. Nursing will continue to assess.    *Scale is 1-10 and 10 is the worst.             "

## 2022-03-20 PROCEDURE — 250N000013 HC RX MED GY IP 250 OP 250 PS 637: Performed by: PSYCHIATRY & NEUROLOGY

## 2022-03-20 PROCEDURE — 124N000003 HC R&B MH SENIOR/ADOLESCENT

## 2022-03-20 RX ADMIN — BUSPIRONE HYDROCHLORIDE 30 MG: 15 TABLET ORAL at 08:57

## 2022-03-20 RX ADMIN — SALMETEROL XINAFOATE 1 PUFF: 50 POWDER, METERED ORAL; RESPIRATORY (INHALATION) at 20:11

## 2022-03-20 RX ADMIN — ASPIRIN 81 MG: 81 TABLET, COATED ORAL at 08:57

## 2022-03-20 RX ADMIN — SALMETEROL XINAFOATE 1 PUFF: 50 POWDER, METERED ORAL; RESPIRATORY (INHALATION) at 08:57

## 2022-03-20 RX ADMIN — DONEPEZIL HYDROCHLORIDE 10 MG: 10 TABLET ORAL at 20:12

## 2022-03-20 RX ADMIN — BUSPIRONE HYDROCHLORIDE 30 MG: 15 TABLET ORAL at 20:11

## 2022-03-20 RX ADMIN — MEMANTINE 10 MG: 10 TABLET ORAL at 08:57

## 2022-03-20 RX ADMIN — GABAPENTIN 100 MG: 100 CAPSULE ORAL at 08:57

## 2022-03-20 RX ADMIN — LEVOTHYROXINE SODIUM 88 MCG: 0.09 TABLET ORAL at 08:57

## 2022-03-20 RX ADMIN — Medication 5 MG: at 20:12

## 2022-03-20 RX ADMIN — MEGESTROL ACETATE 20 MG: 20 TABLET ORAL at 08:57

## 2022-03-20 RX ADMIN — GABAPENTIN 100 MG: 100 CAPSULE ORAL at 13:11

## 2022-03-20 RX ADMIN — MIRTAZAPINE 15 MG: 15 TABLET, FILM COATED ORAL at 20:11

## 2022-03-20 RX ADMIN — MEMANTINE 10 MG: 10 TABLET ORAL at 20:12

## 2022-03-20 RX ADMIN — GABAPENTIN 100 MG: 100 CAPSULE ORAL at 17:31

## 2022-03-20 RX ADMIN — CLOZAPINE 200 MG: 100 TABLET ORAL at 20:11

## 2022-03-20 RX ADMIN — HALOPERIDOL 1 MG: 1 TABLET ORAL at 20:11

## 2022-03-20 ASSESSMENT — ACTIVITIES OF DAILY LIVING (ADL)
ORAL_HYGIENE: PROMPTS
ORAL_HYGIENE: WITH ASSISTANCE
DRESS: INDEPENDENT
DRESS: WITH ASSISTANCE
HYGIENE/GROOMING: PROMPTS
HYGIENE/GROOMING: WITH ASSISTANCE

## 2022-03-20 NOTE — PLAN OF CARE
Patient slept through the night. No concerns reported.  No PRNs administered. 10.5 hrs of sleep.

## 2022-03-20 NOTE — PLAN OF CARE
"Goal Outcome Evaluation:    Plan of Care Reviewed With: patient     Patient answers \"yah\" to the questions do you have anxiety and also to do you have depression but does not rate them.  Denies SI.  Out for dinner, appetite is good.  Does not attend group except for bingo.  Isolates to his room.  P:  Continue same plan of care.                  "

## 2022-03-21 PROBLEM — F20.0 PARANOID SCHIZOPHRENIA, CHRONIC CONDITION WITH ACUTE EXACERBATION (H): Chronic | Status: ACTIVE | Noted: 2022-03-21

## 2022-03-21 PROBLEM — F02.C18: Chronic | Status: ACTIVE | Noted: 2022-03-21

## 2022-03-21 PROCEDURE — 250N000013 HC RX MED GY IP 250 OP 250 PS 637: Performed by: PSYCHIATRY & NEUROLOGY

## 2022-03-21 PROCEDURE — 124N000003 HC R&B MH SENIOR/ADOLESCENT

## 2022-03-21 PROCEDURE — 99232 SBSQ HOSP IP/OBS MODERATE 35: CPT | Performed by: PSYCHIATRY & NEUROLOGY

## 2022-03-21 RX ADMIN — BUSPIRONE HYDROCHLORIDE 30 MG: 15 TABLET ORAL at 20:16

## 2022-03-21 RX ADMIN — MEMANTINE 10 MG: 10 TABLET ORAL at 20:16

## 2022-03-21 RX ADMIN — GABAPENTIN 100 MG: 100 CAPSULE ORAL at 08:37

## 2022-03-21 RX ADMIN — SALMETEROL XINAFOATE 1 PUFF: 50 POWDER, METERED ORAL; RESPIRATORY (INHALATION) at 20:16

## 2022-03-21 RX ADMIN — MEMANTINE 10 MG: 10 TABLET ORAL at 08:37

## 2022-03-21 RX ADMIN — CLOZAPINE 200 MG: 100 TABLET ORAL at 20:16

## 2022-03-21 RX ADMIN — ASPIRIN 81 MG: 81 TABLET, COATED ORAL at 08:37

## 2022-03-21 RX ADMIN — DONEPEZIL HYDROCHLORIDE 10 MG: 10 TABLET ORAL at 20:16

## 2022-03-21 RX ADMIN — BUSPIRONE HYDROCHLORIDE 30 MG: 15 TABLET ORAL at 08:37

## 2022-03-21 RX ADMIN — Medication 5 MG: at 20:16

## 2022-03-21 RX ADMIN — SALMETEROL XINAFOATE 1 PUFF: 50 POWDER, METERED ORAL; RESPIRATORY (INHALATION) at 08:37

## 2022-03-21 RX ADMIN — MIRTAZAPINE 15 MG: 15 TABLET, FILM COATED ORAL at 20:16

## 2022-03-21 RX ADMIN — GABAPENTIN 100 MG: 100 CAPSULE ORAL at 12:03

## 2022-03-21 RX ADMIN — MEGESTROL ACETATE 20 MG: 20 TABLET ORAL at 08:37

## 2022-03-21 RX ADMIN — HALOPERIDOL 1 MG: 1 TABLET ORAL at 20:16

## 2022-03-21 RX ADMIN — LEVOTHYROXINE SODIUM 88 MCG: 0.09 TABLET ORAL at 08:37

## 2022-03-21 RX ADMIN — GABAPENTIN 100 MG: 100 CAPSULE ORAL at 17:18

## 2022-03-21 ASSESSMENT — ACTIVITIES OF DAILY LIVING (ADL)
LAUNDRY: UNABLE TO COMPLETE
DRESS: SCRUBS (BEHAVIORAL HEALTH)
ORAL_HYGIENE: PROMPTS;INDEPENDENT
HYGIENE/GROOMING: PROMPTS;WITH ASSISTANCE
DRESS: SCRUBS (BEHAVIORAL HEALTH);INDEPENDENT
LAUNDRY: UNABLE TO COMPLETE
ORAL_HYGIENE: PROMPTS
HYGIENE/GROOMING: PROMPTS

## 2022-03-21 NOTE — PLAN OF CARE
"  Problem: Behavior Regulation Impairment (Psychotic Signs/Symptoms)  Goal: Improved Behavioral Control (Psychotic Signs/Symptoms)  Outcome: Ongoing, Not Progressing     Problem: Decreased Participation and Engagement (Psychotic Signs/Symptoms)  Goal: Increased Participation and Engagement (Psychotic Signs/Symptoms)  Outcome: Ongoing, Not Progressing     Problem: Mood Impairment (Psychotic Signs/Symptoms)  Goal: Improved Mood Symptoms (Psychotic Signs/Symptoms)  Outcome: Ongoing, Not Progressing   Goal Outcome Evaluation:    Plan of Care Reviewed With: patient     Patient is calm and cooperative, medication compliant. Denies depression, SI, SIB, and hallucinations. Endorses anxiety stating \"when am I going to get the f^%k out of here\". Patient had good appetite, ate 100% of meals. Isolates to room, did not socialize with peers or attend groups. Declined shower this shift.  "

## 2022-03-21 NOTE — PLAN OF CARE
"Problem: Sensory Perception Impairment (Psychotic Signs/Symptoms)  Goal: Decreased Sensory Symptoms (Psychotic Signs/Symptoms)  Outcome: Ongoing, Progressing  Flowsheets (Taken 3/21/2022 1831)  Mutually Determined Action Steps (Decreased Sensory Symptoms): adheres to medication regimen     Pt presents with blunted, flat affect and anxious mood. Denies SI/SIB and hallucinations. Pt not noted to be responding to internal stimuli this shift. Reports continued depression and anxiety r/t still being in the hospital, states he is \"ready to get the fuck out of here!\" Pt is oriented to self only, states that he is at [facility] and that the date is June 13th, 2001. Pt was oriented to place and time but is unable to retain this information. Pt remained isolated to his room most of the shift, only coming out for meals. Appetite and fluid intake adequate - pt ate 75% of his dinner. Denies pain. VSS. Medication compliant. Encouraged pt to shower and offered male assistance but pt adamantly refused. No other concerns or complaints noted.   "

## 2022-03-21 NOTE — PLAN OF CARE
Patient has remain in his room sleeping for the entire shift.  No concerns reported. 8.5 hrs of sleep.

## 2022-03-21 NOTE — PROGRESS NOTES
"PSYCHIATRY  PROGRESS NOTE     DATE OF SERVICE   03/21/2022       CHIEF COMPLAINT   \"I am doing good.\"       SUBJECTIVE   Nursing reports: Patient remains stable and no new behaviors have been reported.        Patient has been discussed with the  during team meeting. Guardianship has not been establish, when this is completes patient will be discharge to [facility].     OBJECTIVE   Patient is well known to this writer as he was my patient when he was admitted in the unit 2800, due to been COVID positive. Patient was seen and evaluated at bedside by him self, this was a face to face evaluation. Patient reported that he is going good and he ask about when he will be leaving the hospital. He was pleasantly confused, calm and very concrete.        MEDICATIONS   Medications:  Scheduled Meds:    aspirin  81 mg Oral Daily     [Held by provider] atorvastatin  80 mg Oral At Bedtime     busPIRone HCl  30 mg Oral BID     cloZAPine  200 mg Oral At Bedtime     donepezil  10 mg Oral At Bedtime     gabapentin  100 mg Oral TID w/meals     haloperidol  1 mg Oral At Bedtime     levothyroxine  88 mcg Oral Daily     megestrol  20 mg Oral Daily     melatonin  5 mg Oral At Bedtime     memantine  10 mg Oral BID     mirtazapine  15 mg Oral At Bedtime     [Held by provider] polyethylene glycol  17 g Oral BID     salmeterol  1 puff Inhalation BID     Continuous Infusions:  PRN Meds:.[Held by provider] acetaminophen, albuterol, alum & mag hydroxide-simethicone, benzocaine-menthol, hydrOXYzine, nicotine, polyethylene glycol-propylene glycol PF, risperiDONE, senna-docusate, [Held by provider] traZODone    Medication adherence issues: MS Med Adherence Y/N: Yes, Hospitalization  Medication side effects: MEDICATION SIDE EFFECTS: no side effects reported  Benefit: Yes / No: Yes       ROS   A comprehensive review of systems was negative.       MENTAL STATUS EXAM   Vitals: /77   Pulse 95   Temp 98  F (36.7  C) (Temporal)   " "Resp 16   Ht 1.778 m (5' 10\")   Wt 76 kg (167 lb 8 oz)   SpO2 98%   BMI 24.03 kg/m      Appearance:  No apparent distress  Mood: \"I am goood\"  Affect: calm and pleasant   Suicidal Ideation: denies   Homicidal Ideation: denies   Thought process: concrete  Thought content: confused   Fund of Knowledge: Below average  Attention/Concentration: Easily distracted  Language ability: Significantly impaired  Memory: Global memory impairment  Insight:  Poor.  Judgement: Poor  Orientation: Only to person  Psychomotor Behavior: slowed    Muscle Strength and Tone: MuscleStrength: Normal and Atrophy  Gait and Station: Not evaluated       LABS   personally reviewed.     No results found for: PHENYTOIN, PHENOBARB, VALPROATE, CBMZ       DIAGNOSIS   Principal Problem:    <principal problem not specified>    Active Problem List:  Patient Active Problem List   Diagnosis     TBI with aggressive behavior     HTN (hypertension)     COPD (chronic obstructive pulmonary disease) (H)     Dementia with behavioral disturbance (H)     Chronic schizophrenia (H)     Smoker     Agitation     Behavior disturbance     Psychosis (H)          PLAN   1. Ongoing education given regarding diagnostic and treatment options with risks, benefits and alternatives and adequate verbalization of understanding.  2.  Medications       BuSpar 30 mg 2 times daily       Clozaril 200 mg at bedtime       Aricept 10 mg at bedtime       Gabapentin 100 mg 3 times a day       Haldol 1 mg at bedtime       Namenda 10 mg 2 times a day       Remeron 15 mg at bedtime  3.  Medical team following the patient as needed   4.   coordinating a safe discharge plan with the patient.    Risk Assessment: [facility] RISK ASSESSMENT: Patient able to contract for safety    Coordination of Care:   Treatment Plan reviewed and physician signed, Care discussed with Care/Treatment Team Members, Chart reviewed and Patient seen      Re-Certification I certify that the inpatient " psychiatric facility services furnished since the previous certification were, and continue to be, medically necessary for, either, treatment which could reasonably be expected to improve the patient s condition or diagnostic study and that the hospital records indicate that the services furnished were, either, intensive treatment services, admission and related services necessary for diagnostic study, or equivalent services.     I certify that the patient continues to need, on a daily basis, active treatment furnished directly by or requiring the supervision of inpatient psychiatric facility personnel.   I estimate 14 days of hospitalization is necessary for proper treatment of the patient. My plans for post-hospital care for this patient are  TBD     Ginger Dubon MD    -     03/21/2022  -     10:18 AM    Total time  15 minutes with > 50%spent on coordination of cares and psycho-education.    This note was created with help of Dragon dictation system. Grammatical / typing errors are not intentional.    Ginger Dubon MD

## 2022-03-21 NOTE — PLAN OF CARE
03/21/22 1336   Individualization/Patient Specific Goals   Patient Vulnerabilities limited social skills;limited support system;lacks insight into illness   Interprofessional Rounds   Summary Patient continues to isolate in room, but is in milieu at times. Patient was accepted to [facility] pending guardianship approval.   Participants Psychiatrist;occupational therapy;social work;nursing   Team Discussion   Participants MD Ling, Juarez Murphy RN, YAHAIRA Singh, Joi Stearns, OT.   Progress patient is at base line   Anticipated length of stay 3-5 weeks pending guardianship approval   Continued Stay Criteria/Rationale guardianship not established.   Plan [facility] once guardianship is established.   Rationale for change in precautions or plan No change.   Anticipated Discharge Disposition assisted living;nursing/skilled nursing care;basic nursing care/long-term care;group home

## 2022-03-22 PROCEDURE — 124N000003 HC R&B MH SENIOR/ADOLESCENT

## 2022-03-22 PROCEDURE — 250N000013 HC RX MED GY IP 250 OP 250 PS 637: Performed by: PSYCHIATRY & NEUROLOGY

## 2022-03-22 PROCEDURE — 99232 SBSQ HOSP IP/OBS MODERATE 35: CPT | Performed by: PSYCHIATRY & NEUROLOGY

## 2022-03-22 RX ADMIN — GABAPENTIN 100 MG: 100 CAPSULE ORAL at 12:50

## 2022-03-22 RX ADMIN — ASPIRIN 81 MG: 81 TABLET, COATED ORAL at 08:10

## 2022-03-22 RX ADMIN — BUSPIRONE HYDROCHLORIDE 30 MG: 15 TABLET ORAL at 20:51

## 2022-03-22 RX ADMIN — GABAPENTIN 100 MG: 100 CAPSULE ORAL at 17:23

## 2022-03-22 RX ADMIN — GABAPENTIN 100 MG: 100 CAPSULE ORAL at 08:10

## 2022-03-22 RX ADMIN — LEVOTHYROXINE SODIUM 88 MCG: 0.09 TABLET ORAL at 08:10

## 2022-03-22 RX ADMIN — Medication 5 MG: at 20:52

## 2022-03-22 RX ADMIN — SALMETEROL XINAFOATE 1 PUFF: 50 POWDER, METERED ORAL; RESPIRATORY (INHALATION) at 08:38

## 2022-03-22 RX ADMIN — SALMETEROL XINAFOATE 1 PUFF: 50 POWDER, METERED ORAL; RESPIRATORY (INHALATION) at 20:52

## 2022-03-22 RX ADMIN — DONEPEZIL HYDROCHLORIDE 10 MG: 10 TABLET ORAL at 20:52

## 2022-03-22 RX ADMIN — MEMANTINE 10 MG: 10 TABLET ORAL at 08:10

## 2022-03-22 RX ADMIN — BUSPIRONE HYDROCHLORIDE 30 MG: 15 TABLET ORAL at 08:10

## 2022-03-22 RX ADMIN — MIRTAZAPINE 15 MG: 15 TABLET, FILM COATED ORAL at 20:52

## 2022-03-22 RX ADMIN — MEMANTINE 10 MG: 10 TABLET ORAL at 20:52

## 2022-03-22 RX ADMIN — CLOZAPINE 200 MG: 100 TABLET ORAL at 20:51

## 2022-03-22 RX ADMIN — HALOPERIDOL 1 MG: 1 TABLET ORAL at 20:52

## 2022-03-22 RX ADMIN — MEGESTROL ACETATE 20 MG: 20 TABLET ORAL at 08:10

## 2022-03-22 ASSESSMENT — ACTIVITIES OF DAILY LIVING (ADL)
LAUNDRY: UNABLE TO COMPLETE
LAUNDRY: UNABLE TO COMPLETE
ORAL_HYGIENE: PROMPTS;INDEPENDENT
ORAL_HYGIENE: INDEPENDENT;PROMPTS
DRESS: SCRUBS (BEHAVIORAL HEALTH)
DRESS: SCRUBS (BEHAVIORAL HEALTH)
HYGIENE/GROOMING: PROMPTS;WITH ASSISTANCE
HYGIENE/GROOMING: PROMPTS;WITH ASSISTANCE

## 2022-03-22 NOTE — PLAN OF CARE
Problem: Sleep Disturbance (Psychotic Signs/Symptoms)  Goal: Improved Sleep (Psychotic Signs/Symptoms)  Outcome: Met    Slept a total of 11 hours. No behavioral concerns noted the whole shift.

## 2022-03-22 NOTE — PLAN OF CARE
Problem: Decreased Participation and Engagement (Psychotic Signs/Symptoms)  Goal: Increased Participation and Engagement (Psychotic Signs/Symptoms)  Outcome: Ongoing, Not Progressing     Problem: Mood Impairment (Psychotic Signs/Symptoms)  Goal: Improved Mood Symptoms (Psychotic Signs/Symptoms)  Outcome: Ongoing, Not Progressing   Goal Outcome Evaluation:    Plan of Care Reviewed With: patient       Patient calm, affect flat. Denies anxiety, depression, hallucinations, SI, SIB. Refused morning vitals, allowed staff to check at noon. Patient ate breakfast late, around 10am, good appetite. Ate 100% of breakfast and 75% of lunch. Did not attend groups, not social with peers, denies pain. Patient is disheveled, declined ADLs.

## 2022-03-22 NOTE — PROGRESS NOTES
"PSYCHIATRY  PROGRESS NOTE     DATE OF SERVICE   03/22/2022       CHIEF COMPLAINT   \"When I am leaving?.\"       SUBJECTIVE   Nursing reports: Patient remains stable, last night he slept 11 hours.        Patient has been discussed with the  during team meeting. Guardianship has not been establish, when this is completes patient will be discharge to [facility].     OBJECTIVE   Patient was seen and evaluated at bedside by him self, this was a face to face evaluation. Patient didn't answer any of my questions and he only kept asking when he will be leaving the hospital. I updated the patient about his discharge plans but he didn't seemed to understand the information provided to him. Continues to deny SI/HI. Denies any concerns.        MEDICATIONS   Medications:  Scheduled Meds:    aspirin  81 mg Oral Daily     [Held by provider] atorvastatin  80 mg Oral At Bedtime     busPIRone HCl  30 mg Oral BID     cloZAPine  200 mg Oral At Bedtime     donepezil  10 mg Oral At Bedtime     gabapentin  100 mg Oral TID w/meals     haloperidol  1 mg Oral At Bedtime     levothyroxine  88 mcg Oral Daily     megestrol  20 mg Oral Daily     melatonin  5 mg Oral At Bedtime     memantine  10 mg Oral BID     mirtazapine  15 mg Oral At Bedtime     [Held by provider] polyethylene glycol  17 g Oral BID     salmeterol  1 puff Inhalation BID     Continuous Infusions:  PRN Meds:.[Held by provider] acetaminophen, albuterol, alum & mag hydroxide-simethicone, benzocaine-menthol, hydrOXYzine, nicotine, polyethylene glycol-propylene glycol PF, risperiDONE, senna-docusate, [Held by provider] traZODone    Medication adherence issues: MS Med Adherence Y/N: Yes, Hospitalization  Medication side effects: MEDICATION SIDE EFFECTS: no side effects reported  Benefit: Yes / No: Yes       ROS   A comprehensive review of systems was negative.       MENTAL STATUS EXAM   Vitals: /77 (BP Location: Left arm, Patient Position: Supine, Cuff Size: " "Adult Regular)   Pulse 120   Temp 98.5  F (36.9  C) (Oral)   Resp 16   Ht 1.778 m (5' 10\")   Wt 76 kg (167 lb 8 oz)   SpO2 100%   BMI 24.03 kg/m      Appearance:  No apparent distress  Mood: \"I want to leave\"  Affect: calm   Suicidal Ideation: denies   Homicidal Ideation: denies   Thought process: concrete  Thought content: confused   Fund of Knowledge: Below average  Attention/Concentration: Easily distracted  Language ability: Significantly impaired  Memory: Global memory impairment  Insight:  Poor.  Judgement: Poor  Orientation: Only to person  Psychomotor Behavior: slowed    Muscle Strength and Tone: MuscleStrength: Normal and Atrophy  Gait and Station: Not evaluated       LABS   personally reviewed.     No results found for: PHENYTOIN, PHENOBARB, VALPROATE, CBMZ       DIAGNOSIS   Principal Problem:    Major neurocognitive disorder, due to multiple etiologies, with behavioral disturbance, severe (H)    Active Problem List:  Patient Active Problem List   Diagnosis     TBI with aggressive behavior     HTN (hypertension)     COPD (chronic obstructive pulmonary disease) (H)     Dementia with behavioral disturbance (H)     Chronic schizophrenia (H)     Smoker     Agitation     Behavior disturbance     Psychosis (H)     Major neurocognitive disorder, due to multiple etiologies, with behavioral disturbance, severe (H)     Paranoid schizophrenia, chronic condition with acute exacerbation (H)          PLAN   1. Ongoing education given regarding diagnostic and treatment options with risks, benefits and alternatives and adequate verbalization of understanding.  2.  Medications       BuSpar 30 mg 2 times daily       Clozaril 200 mg at bedtime       Aricept 10 mg at bedtime       Gabapentin 100 mg 3 times a day       Haldol 1 mg at bedtime       Namenda 10 mg 2 times a day       Remeron 15 mg at bedtime  3.  Medical team following the patient as needed   4.   coordinating a safe discharge plan with the " patient.    Risk Assessment: [facility] RISK ASSESSMENT: Patient able to contract for safety    Coordination of Care:   Treatment Plan reviewed and physician signed, Care discussed with Care/Treatment Team Members, Chart reviewed and Patient seen      Re-Certification I certify that the inpatient psychiatric facility services furnished since the previous certification were, and continue to be, medically necessary for, either, treatment which could reasonably be expected to improve the patient s condition or diagnostic study and that the hospital records indicate that the services furnished were, either, intensive treatment services, admission and related services necessary for diagnostic study, or equivalent services.     I certify that the patient continues to need, on a daily basis, active treatment furnished directly by or requiring the supervision of inpatient psychiatric facility personnel.   I estimate 14 days of hospitalization is necessary for proper treatment of the patient. My plans for post-hospital care for this patient are  TBD     Ginger Dubon MD    -     03/22/2022  -     2:39 PM    Total time  15 minutes with > 50%spent on coordination of cares and psycho-education.    This note was created with help of Dragon dictation system. Grammatical / typing errors are not intentional.    Ginger Dubon MD

## 2022-03-23 PROCEDURE — 250N000013 HC RX MED GY IP 250 OP 250 PS 637: Performed by: PSYCHIATRY & NEUROLOGY

## 2022-03-23 PROCEDURE — 99232 SBSQ HOSP IP/OBS MODERATE 35: CPT | Performed by: PSYCHIATRY & NEUROLOGY

## 2022-03-23 PROCEDURE — 99231 SBSQ HOSP IP/OBS SF/LOW 25: CPT | Performed by: PHYSICIAN ASSISTANT

## 2022-03-23 PROCEDURE — 124N000003 HC R&B MH SENIOR/ADOLESCENT

## 2022-03-23 RX ADMIN — GABAPENTIN 100 MG: 100 CAPSULE ORAL at 12:11

## 2022-03-23 RX ADMIN — GABAPENTIN 100 MG: 100 CAPSULE ORAL at 17:24

## 2022-03-23 RX ADMIN — SALMETEROL XINAFOATE 1 PUFF: 50 POWDER, METERED ORAL; RESPIRATORY (INHALATION) at 19:49

## 2022-03-23 RX ADMIN — SALMETEROL XINAFOATE 1 PUFF: 50 POWDER, METERED ORAL; RESPIRATORY (INHALATION) at 08:22

## 2022-03-23 RX ADMIN — MEMANTINE 10 MG: 10 TABLET ORAL at 08:22

## 2022-03-23 RX ADMIN — GABAPENTIN 100 MG: 100 CAPSULE ORAL at 08:22

## 2022-03-23 RX ADMIN — Medication 5 MG: at 19:48

## 2022-03-23 RX ADMIN — BUSPIRONE HYDROCHLORIDE 30 MG: 15 TABLET ORAL at 08:22

## 2022-03-23 RX ADMIN — MIRTAZAPINE 15 MG: 15 TABLET, FILM COATED ORAL at 19:48

## 2022-03-23 RX ADMIN — MEGESTROL ACETATE 20 MG: 20 TABLET ORAL at 08:22

## 2022-03-23 RX ADMIN — DONEPEZIL HYDROCHLORIDE 10 MG: 10 TABLET ORAL at 19:48

## 2022-03-23 RX ADMIN — BUSPIRONE HYDROCHLORIDE 30 MG: 15 TABLET ORAL at 19:48

## 2022-03-23 RX ADMIN — MEMANTINE 10 MG: 10 TABLET ORAL at 19:48

## 2022-03-23 RX ADMIN — LEVOTHYROXINE SODIUM 88 MCG: 0.09 TABLET ORAL at 08:22

## 2022-03-23 RX ADMIN — ASPIRIN 81 MG: 81 TABLET, COATED ORAL at 08:22

## 2022-03-23 RX ADMIN — CLOZAPINE 200 MG: 100 TABLET ORAL at 19:48

## 2022-03-23 RX ADMIN — HALOPERIDOL 1 MG: 1 TABLET ORAL at 19:48

## 2022-03-23 ASSESSMENT — ACTIVITIES OF DAILY LIVING (ADL)
DRESS: SCRUBS (BEHAVIORAL HEALTH)
HYGIENE/GROOMING: PROMPTS;WITH ASSISTANCE
HYGIENE/GROOMING: PROMPTS;WITH ASSISTANCE
ORAL_HYGIENE: PROMPTS;WITH ASSISTANCE
LAUNDRY: UNABLE TO COMPLETE
ORAL_HYGIENE: PROMPTS;INDEPENDENT
DRESS: SCRUBS (BEHAVIORAL HEALTH)
LAUNDRY: UNABLE TO COMPLETE

## 2022-03-23 NOTE — PLAN OF CARE
Problem: Behavior Regulation Impairment (Psychotic Signs/Symptoms)  Goal: Improved Behavioral Control (Psychotic Signs/Symptoms)  Outcome: Ongoing, Not Progressing     Problem: Cognitive Impairment (Psychotic Signs/Symptoms)  Goal: Optimal Cognitive Function (Psychotic Signs/Symptoms)  Outcome: Ongoing, Not Progressing     Problem: Decreased Participation and Engagement (Psychotic Signs/Symptoms)  Goal: Increased Participation and Engagement (Psychotic Signs/Symptoms)  Outcome: Ongoing, Not Progressing   Goal Outcome Evaluation:    Plan of Care Reviewed With: patient     Patient calm cooperative, affect flat and blunted. Medication compliant. Patient isolates to room, visibile for meals only. Good appetite, ate 75% of breakfast and 50% for lunch. Upon leaving the lounge after lunch patient fell.was attempting to stand using the arm rest when the chair moved back and tripped the patient. Patient did not hit head or sustain injuries. Internal medicine saw patient and compass report completed. Patient's emergency contact unable to be reached and voicemail box was full.   Recommendation from post fall huddle: Escort patient to and from meals.

## 2022-03-23 NOTE — PROGRESS NOTES
"Asked to assess after fall.    Patient conversant on writers arrival to room, but guarded. He notes he fell, states he tripped over the chair when getting up, fell onto the outer part of his right leg. Denies dizziness/weakness. Witnessed per staff, did not hit head, did not lose consciousness- pt agrees. No HA, nausea/vomiting. Notes he was able to walk to room unassisted without pain. No numbness/tingling/weakness. When asked if I could listen to his heart and lungs, Patient got agitated and stated, \"I just want to get the fuck out of here\", \"this doctor is going to get sued\", \"I shouldn't even be here\". Sympathized, noted I would let team know. He then stated \"go on and get out of here, no\".     /89 (BP Location: Right arm, Patient Position: Sitting)   Pulse (!) 123   Temp 97.3  F (36.3  C) (Oral)   Resp 16   Ht 1.778 m (5' 10\")   Wt 76 kg (167 lb 8 oz)   SpO2 99%   BMI 24.03 kg/m    GEN: Awake, lying in bed on left side  EXT: Able to move right leg freely    Refused rest of exam.    #Mechanical fall  -Low suspicion for injury given mechanism of fall and absence of physical symptoms. Monitor throughout the day and notify medicine if any changes (RN verbally notified)    Belen Chavez PA-C   "

## 2022-03-23 NOTE — PLAN OF CARE
Problem: Behavior Regulation Impairment (Psychotic Signs/Symptoms)  Goal: Improved Behavioral Control (Psychotic Signs/Symptoms)  Outcome: Ongoing, Progressing    Patient slept a total of 10.5 hours without any mental health concerns raised the whole shift.

## 2022-03-23 NOTE — PLAN OF CARE
"Goal Outcome Evaluation:  Problem: Mood Impairment (Psychotic Signs/Symptoms)  Goal: Improved Mood Symptoms (Psychotic Signs/Symptoms)  Outcome: Ongoing, Not Progressing     Pt continues to be isolative and withdrawn.  Affect is flat.  Pt endorses anxiety due to \"not being let out of here.\"  Pt denies depression/SI/SIB/hallucinations.  Appetite is good - ate 75% of dinner meal.  Pt continues to decline to shower.  Pt declined offer to assist with ADLs.                      "

## 2022-03-24 PROCEDURE — 250N000013 HC RX MED GY IP 250 OP 250 PS 637: Performed by: PSYCHIATRY & NEUROLOGY

## 2022-03-24 PROCEDURE — 99231 SBSQ HOSP IP/OBS SF/LOW 25: CPT | Performed by: PSYCHIATRY & NEUROLOGY

## 2022-03-24 PROCEDURE — 124N000003 HC R&B MH SENIOR/ADOLESCENT

## 2022-03-24 RX ADMIN — MEGESTROL ACETATE 20 MG: 20 TABLET ORAL at 08:33

## 2022-03-24 RX ADMIN — HALOPERIDOL 1 MG: 1 TABLET ORAL at 20:12

## 2022-03-24 RX ADMIN — BUSPIRONE HYDROCHLORIDE 30 MG: 15 TABLET ORAL at 20:12

## 2022-03-24 RX ADMIN — SALMETEROL XINAFOATE 1 PUFF: 50 POWDER, METERED ORAL; RESPIRATORY (INHALATION) at 20:13

## 2022-03-24 RX ADMIN — BUSPIRONE HYDROCHLORIDE 30 MG: 15 TABLET ORAL at 08:33

## 2022-03-24 RX ADMIN — LEVOTHYROXINE SODIUM 88 MCG: 0.09 TABLET ORAL at 08:33

## 2022-03-24 RX ADMIN — Medication 5 MG: at 20:12

## 2022-03-24 RX ADMIN — GABAPENTIN 100 MG: 100 CAPSULE ORAL at 08:33

## 2022-03-24 RX ADMIN — MEMANTINE 10 MG: 10 TABLET ORAL at 20:12

## 2022-03-24 RX ADMIN — GABAPENTIN 100 MG: 100 CAPSULE ORAL at 12:52

## 2022-03-24 RX ADMIN — ASPIRIN 81 MG: 81 TABLET, COATED ORAL at 08:33

## 2022-03-24 RX ADMIN — DONEPEZIL HYDROCHLORIDE 10 MG: 10 TABLET ORAL at 20:12

## 2022-03-24 RX ADMIN — MEMANTINE 10 MG: 10 TABLET ORAL at 08:33

## 2022-03-24 RX ADMIN — SALMETEROL XINAFOATE 1 PUFF: 50 POWDER, METERED ORAL; RESPIRATORY (INHALATION) at 08:38

## 2022-03-24 RX ADMIN — GABAPENTIN 100 MG: 100 CAPSULE ORAL at 17:31

## 2022-03-24 RX ADMIN — MIRTAZAPINE 15 MG: 15 TABLET, FILM COATED ORAL at 20:12

## 2022-03-24 RX ADMIN — CLOZAPINE 200 MG: 100 TABLET ORAL at 20:12

## 2022-03-24 ASSESSMENT — ACTIVITIES OF DAILY LIVING (ADL)
DRESS: SCRUBS (BEHAVIORAL HEALTH);INDEPENDENT
LAUNDRY: UNABLE TO COMPLETE
ORAL_HYGIENE: INDEPENDENT
HYGIENE/GROOMING: INDEPENDENT

## 2022-03-24 NOTE — PLAN OF CARE
"  Problem: Mood Impairment (Psychotic Signs/Symptoms)  Goal: Improved Mood Symptoms (Psychotic Signs/Symptoms)  Outcome: Ongoing, Not Progressing     Problem: Psychomotor Impairment (Psychotic Signs/Symptoms)  Goal: Improved Psychomotor Symptoms (Psychotic Signs/Symptoms)  Outcome: Ongoing, Progressing   Goal Outcome Evaluation:    Pt appears depressed, flat affect, withdraw, isolative, calm and cooperative. Stated \" I am very tired\", minimal conversation, sometimes will not answer questions or ignores others. pt also becomes agitated when asked question or try to engage conversation with him \" I just want to get the f,,,, out of here\" stated. Reported depression 10/10, anxiety 3/10, pt spent most of the shift in his room, awake, did not participated group activity. Eat 50% of his breakfast and lunch. Poor personal hygiene, refused to take shower, male staff was available to assist the patient when he is taking shower for fall prevention. Medication compliant, did not report side effects. Denies SI/HI, or hallucination. Currently he is in his room, sleeping with normal respiration, will continue to monitor.         "

## 2022-03-24 NOTE — PLAN OF CARE
Problem: Sleep Disturbance (Psychotic Signs/Symptoms)  Goal: Improved Sleep (Psychotic Signs/Symptoms)  Outcome: Met    Patient slept throughout the night, a total of 10 hours. Did not get up to void even a single time. No requests made and no mental health issues raised the whole shift . Slept a total of 10 hours.

## 2022-03-24 NOTE — PROGRESS NOTES
"PSYCHIATRY  PROGRESS NOTE     DATE OF SERVICE   03/24/2022       CHIEF COMPLAINT   \"I am doing all right\"       SUBJECTIVE   Nursing reports: Pt presents with blunted, flat affect and calm mood. Denies SI/SIB and hallucinations this evening - not noted to be responding to internal stimuli. Pt would not answer questions about anxiety or depression this evening. Continues to be oriented to self only. Remained isolated to his room most of the shift, only coming out to play Bingo and eat dinner. Gait is steady and balanced, pt did experience a fall on day shift, no further issues noted this evening. Ate 50% of his dinner. Fluid intake adequate. VSS. Medication compliant. Offered male assistance with a shower multiple times but pt refused. No other concerns or complaints noted.    Patient has been discussed with the  during team meeting.  There has been no updates about the guardianship process.     OBJECTIVE   Today patient was seen and evaluated at bedside by himself, this was a face-to-face evaluation.  Patient continues to tell me that he is doing \"all right\".  Patient is also demanding to leave the hospital today and tells me that he should not be here anymore.  He has poor understanding about the discharge plan.  Patient has been observed responding to internal stimuli, constantly mumbling to himself.  He has presented as blunted and irritable.  Continues to deny having thoughts of harming himself and has been able to contract for safety.       MEDICATIONS   Medications:  Scheduled Meds:    aspirin  81 mg Oral Daily     [Held by provider] atorvastatin  80 mg Oral At Bedtime     busPIRone HCl  30 mg Oral BID     cloZAPine  200 mg Oral At Bedtime     donepezil  10 mg Oral At Bedtime     gabapentin  100 mg Oral TID w/meals     haloperidol  1 mg Oral At Bedtime     levothyroxine  88 mcg Oral Daily     megestrol  20 mg Oral Daily     melatonin  5 mg Oral At Bedtime     memantine  10 mg Oral BID     " "mirtazapine  15 mg Oral At Bedtime     [Held by provider] polyethylene glycol  17 g Oral BID     salmeterol  1 puff Inhalation BID     Continuous Infusions:  PRN Meds:.[Held by provider] acetaminophen, albuterol, alum & mag hydroxide-simethicone, benzocaine-menthol, hydrOXYzine, nicotine, polyethylene glycol-propylene glycol PF, risperiDONE, senna-docusate, [Held by provider] traZODone    Medication adherence issues: MS Med Adherence Y/N: Yes, Hospitalization  Medication side effects: MEDICATION SIDE EFFECTS: no side effects reported  Benefit: Yes / No: Yes       ROS   A comprehensive review of systems was negative.       MENTAL STATUS EXAM   Vitals: /87 (BP Location: Right arm, Patient Position: Supine)   Pulse 89   Temp 97.2  F (36.2  C) (Tympanic)   Resp 16   Ht 1.778 m (5' 10\")   Wt 75.4 kg (166 lb 4.8 oz)   SpO2 97%   BMI 23.86 kg/m      Appearance:  No apparent distress  Mood: \"I am doing all right\"  Affect: Irritable and blunted  Suicidal Ideation: denies   Homicidal Ideation: denies   Thought process: concrete  Thought content: confused   Fund of Knowledge: Below average  Attention/Concentration: Easily distracted  Language ability: Significantly impaired  Memory: Global memory impairment  Insight:  Poor.  Judgement: Poor  Orientation: Only to person  Psychomotor Behavior: slowed    Muscle Strength and Tone: MuscleStrength: Normal and Atrophy  Gait and Station: Not evaluated       LABS   personally reviewed.     No results found for: PHENYTOIN, PHENOBARB, VALPROATE, CBMZ       DIAGNOSIS   Principal Problem:    Major neurocognitive disorder, due to multiple etiologies, with behavioral disturbance, severe (H)    Active Problem List:  Patient Active Problem List   Diagnosis     TBI with aggressive behavior     HTN (hypertension)     COPD (chronic obstructive pulmonary disease) (H)     Dementia with behavioral disturbance (H)     Chronic schizophrenia (H)     Smoker     Agitation     Behavior " disturbance     Psychosis (H)     Major neurocognitive disorder, due to multiple etiologies, with behavioral disturbance, severe (H)     Paranoid schizophrenia, chronic condition with acute exacerbation (H)          PLAN   1. Ongoing education given regarding diagnostic and treatment options with risks, benefits and alternatives and adequate verbalization of understanding.  2.  Medications       BuSpar 30 mg 2 times daily       Clozaril 200 mg at bedtime       Aricept 10 mg at bedtime       Gabapentin 100 mg 3 times a day       Haldol 1 mg at bedtime       Namenda 10 mg 2 times a day       Remeron 15 mg at bedtime  3.  Medical team following the patient as needed   4.   coordinating a safe discharge plan with the patient.    Risk Assessment: [facility] RISK ASSESSMENT: Patient able to contract for safety    Coordination of Care:   Treatment Plan reviewed and physician signed, Care discussed with Care/Treatment Team Members, Chart reviewed and Patient seen      Re-Certification I certify that the inpatient psychiatric facility services furnished since the previous certification were, and continue to be, medically necessary for, either, treatment which could reasonably be expected to improve the patient s condition or diagnostic study and that the hospital records indicate that the services furnished were, either, intensive treatment services, admission and related services necessary for diagnostic study, or equivalent services.     I certify that the patient continues to need, on a daily basis, active treatment furnished directly by or requiring the supervision of inpatient psychiatric facility personnel.   I estimate 14 days of hospitalization is necessary for proper treatment of the patient. My plans for post-hospital care for this patient are  TBD     Ginger Dubon MD    -     03/24/2022  -     11:40 AM    Total time 15 minutes with > 50%spent on coordination of cares and  psycho-education.    This note was created with help of Dragon dictation system. Grammatical / typing errors are not intentional.    Ginger Dubon MD

## 2022-03-24 NOTE — PROGRESS NOTES
"PSYCHIATRY  PROGRESS NOTE     DATE OF SERVICE   03/23/2022       CHIEF COMPLAINT   \"I want to leave the hospital.\"       SUBJECTIVE   Nursing reports: Plan of Care Reviewed With: patient      Patient calm cooperative, affect flat and blunted. Medication compliant. Patient isolates to room, visibile for meals only. Good appetite, ate 75% of breakfast and 50% for lunch. Upon leaving the lounge after lunch patient fell.was attempting to stand using the arm rest when the chair moved back and tripped the patient. Patient did not hit head or sustain injuries. Internal medicine saw patient and compass report completed. Patient's emergency contact unable to be reached and voicemail box was full.   Recommendation from post fall huddle: Escort patient to and from meals.    Patient has been discussed with the  during team meeting. Guardianship has not been establish, when this is completes patient will be discharge to [facility].     OBJECTIVE   Patient was seen and evaluated at bedside by him self, this was a face to face evaluation.  Patient reported that he wants to leave the hospital today and he is doing all right.  I discussed with the patient that we continue to work with the [facility] and the plan will be for him to go to a nursing home.  Patient replied that he does not need to go to a nursing home as he has a mansion in [facility].    Later on during lunchtime I observe the patient and falling in the floor.  Patient fell when he was attempting to stand up using the arm rest of his chair.  Patient did not hit his head.  Medical team was informed and they will be coming to assess the patient.       MEDICATIONS   Medications:  Scheduled Meds:    aspirin  81 mg Oral Daily     [Held by provider] atorvastatin  80 mg Oral At Bedtime     busPIRone HCl  30 mg Oral BID     cloZAPine  200 mg Oral At Bedtime     donepezil  10 mg Oral At Bedtime     gabapentin  100 mg Oral TID w/meals     " "haloperidol  1 mg Oral At Bedtime     levothyroxine  88 mcg Oral Daily     megestrol  20 mg Oral Daily     melatonin  5 mg Oral At Bedtime     memantine  10 mg Oral BID     mirtazapine  15 mg Oral At Bedtime     [Held by provider] polyethylene glycol  17 g Oral BID     salmeterol  1 puff Inhalation BID     Continuous Infusions:  PRN Meds:.[Held by provider] acetaminophen, albuterol, alum & mag hydroxide-simethicone, benzocaine-menthol, hydrOXYzine, nicotine, polyethylene glycol-propylene glycol PF, risperiDONE, senna-docusate, [Held by provider] traZODone    Medication adherence issues: MS Med Adherence Y/N: Yes, Hospitalization  Medication side effects: MEDICATION SIDE EFFECTS: no side effects reported  Benefit: Yes / No: Yes       ROS   A comprehensive review of systems was negative.       MENTAL STATUS EXAM   Vitals: /84 (BP Location: Left arm, Patient Position: Sitting)   Pulse 111   Temp 98.1  F (36.7  C) (Oral)   Resp 16   Ht 1.778 m (5' 10\")   Wt 76 kg (167 lb 8 oz)   SpO2 99%   BMI 24.03 kg/m      Appearance:  No apparent distress  Mood: \"I want to leave today\"  Affect: Irritable  Suicidal Ideation: denies   Homicidal Ideation: denies   Thought process: concrete  Thought content: confused   Fund of Knowledge: Below average  Attention/Concentration: Easily distracted  Language ability: Significantly impaired  Memory: Global memory impairment  Insight:  Poor.  Judgement: Poor  Orientation: Only to person  Psychomotor Behavior: slowed    Muscle Strength and Tone: MuscleStrength: Normal and Atrophy  Gait and Station: Not evaluated       LABS   personally reviewed.     No results found for: PHENYTOIN, PHENOBARB, VALPROATE, CBMZ       DIAGNOSIS   Principal Problem:    Major neurocognitive disorder, due to multiple etiologies, with behavioral disturbance, severe (H)    Active Problem List:  Patient Active Problem List   Diagnosis     TBI with aggressive behavior     HTN (hypertension)     COPD (chronic " obstructive pulmonary disease) (H)     Dementia with behavioral disturbance (H)     Chronic schizophrenia (H)     Smoker     Agitation     Behavior disturbance     Psychosis (H)     Major neurocognitive disorder, due to multiple etiologies, with behavioral disturbance, severe (H)     Paranoid schizophrenia, chronic condition with acute exacerbation (H)          PLAN   1. Ongoing education given regarding diagnostic and treatment options with risks, benefits and alternatives and adequate verbalization of understanding.  2.  Medications       BuSpar 30 mg 2 times daily       Clozaril 200 mg at bedtime       Aricept 10 mg at bedtime       Gabapentin 100 mg 3 times a day       Haldol 1 mg at bedtime       Namenda 10 mg 2 times a day       Remeron 15 mg at bedtime  3.  Medical team following the patient as needed   4.   coordinating a safe discharge plan with the patient.    Risk Assessment: [facility] RISK ASSESSMENT: Patient able to contract for safety    Coordination of Care:   Treatment Plan reviewed and physician signed, Care discussed with Care/Treatment Team Members, Chart reviewed and Patient seen      Re-Certification I certify that the inpatient psychiatric facility services furnished since the previous certification were, and continue to be, medically necessary for, either, treatment which could reasonably be expected to improve the patient s condition or diagnostic study and that the hospital records indicate that the services furnished were, either, intensive treatment services, admission and related services necessary for diagnostic study, or equivalent services.     I certify that the patient continues to need, on a daily basis, active treatment furnished directly by or requiring the supervision of inpatient psychiatric facility personnel.   I estimate 14 days of hospitalization is necessary for proper treatment of the patient. My plans for post-hospital care for this patient are  TBD     Ginger  MD Ling    -     03/23/2022  -     8:47 PM    Total time 15 minutes with > 50%spent on coordination of cares and psycho-education.    This note was created with help of Dragon dictation system. Grammatical / typing errors are not intentional.    Ginger Dubon MD

## 2022-03-24 NOTE — PLAN OF CARE
Problem: Psychomotor Impairment (Psychotic Signs/Symptoms)  Goal: Improved Psychomotor Symptoms (Psychotic Signs/Symptoms)  Outcome: Ongoing, Progressing  Flowsheets (Taken 3/23/2022 2046)  Mutually Determined Action Steps (Improved Psychomotor Symptoms):   adheres to medication regimen   exhibits decrease in agitation  Intervention: Manage Psychomotor Movement  Recent Flowsheet Documentation  Taken 3/23/2022 2041 by Marilyn Monroe RN  Patient Performed Hygiene: patient refused  Activity (Behavioral Health): up ad holley      Pt presents with blunted, flat affect and calm mood. Denies SI/SIB and hallucinations this evening - not noted to be responding to internal stimuli. Pt would not answer questions about anxiety or depression this evening. Continues to be oriented to self only. Remained isolated to his room most of the shift, only coming out to play Bingo and eat dinner. Gait is steady and balanced, pt did experience a fall on day shift, no further issues noted this evening. Ate 50% of his dinner. Fluid intake adequate. VSS. Medication compliant. Offered male assistance with a shower multiple times but pt refused. No other concerns or complaints noted.

## 2022-03-25 PROCEDURE — 250N000013 HC RX MED GY IP 250 OP 250 PS 637: Performed by: PSYCHIATRY & NEUROLOGY

## 2022-03-25 PROCEDURE — 124N000003 HC R&B MH SENIOR/ADOLESCENT

## 2022-03-25 PROCEDURE — 99231 SBSQ HOSP IP/OBS SF/LOW 25: CPT | Performed by: PSYCHIATRY & NEUROLOGY

## 2022-03-25 RX ADMIN — MEMANTINE 10 MG: 10 TABLET ORAL at 08:35

## 2022-03-25 RX ADMIN — DONEPEZIL HYDROCHLORIDE 10 MG: 10 TABLET ORAL at 20:06

## 2022-03-25 RX ADMIN — Medication 5 MG: at 20:06

## 2022-03-25 RX ADMIN — GABAPENTIN 100 MG: 100 CAPSULE ORAL at 08:35

## 2022-03-25 RX ADMIN — SALMETEROL XINAFOATE 1 PUFF: 50 POWDER, METERED ORAL; RESPIRATORY (INHALATION) at 20:06

## 2022-03-25 RX ADMIN — MEMANTINE 10 MG: 10 TABLET ORAL at 20:06

## 2022-03-25 RX ADMIN — CLOZAPINE 200 MG: 100 TABLET ORAL at 20:06

## 2022-03-25 RX ADMIN — GABAPENTIN 100 MG: 100 CAPSULE ORAL at 17:29

## 2022-03-25 RX ADMIN — BUSPIRONE HYDROCHLORIDE 30 MG: 15 TABLET ORAL at 20:06

## 2022-03-25 RX ADMIN — MEGESTROL ACETATE 20 MG: 20 TABLET ORAL at 08:35

## 2022-03-25 RX ADMIN — MIRTAZAPINE 15 MG: 15 TABLET, FILM COATED ORAL at 20:06

## 2022-03-25 RX ADMIN — LEVOTHYROXINE SODIUM 88 MCG: 0.09 TABLET ORAL at 08:35

## 2022-03-25 RX ADMIN — BUSPIRONE HYDROCHLORIDE 30 MG: 15 TABLET ORAL at 08:35

## 2022-03-25 RX ADMIN — ASPIRIN 81 MG: 81 TABLET, COATED ORAL at 08:35

## 2022-03-25 RX ADMIN — SALMETEROL XINAFOATE 1 PUFF: 50 POWDER, METERED ORAL; RESPIRATORY (INHALATION) at 08:35

## 2022-03-25 RX ADMIN — HALOPERIDOL 1 MG: 1 TABLET ORAL at 20:06

## 2022-03-25 RX ADMIN — GABAPENTIN 100 MG: 100 CAPSULE ORAL at 12:26

## 2022-03-25 ASSESSMENT — ACTIVITIES OF DAILY LIVING (ADL)
HYGIENE/GROOMING: PROMPTS
ORAL_HYGIENE: PROMPTS
DRESS: SCRUBS (BEHAVIORAL HEALTH)

## 2022-03-25 NOTE — PLAN OF CARE
Problem: Behavior Regulation Impairment (Psychotic Signs/Symptoms)  Goal: Improved Behavioral Control (Psychotic Signs/Symptoms)  Outcome: Ongoing, Progressing   Goal Outcome Evaluation:    Plan of Care Reviewed With: patient     Patient calm cooperative, medication compliant, good appetite. Endorses depression related to hospital stay, denies all other mental health concerns. Patient is withdrawn and isolative, did not attend groups. Denies pain, refused ADLs      PM: Patient Alert to self, calm cooperative, present for meal otherwise isolates in room. Ate 25% of dinner. Did not come out for evening snacks, declined shower. Not social with peers and staff, did not attend group. Patient endorses depression related to hospital stay.  Denies pain, vital signs stable.

## 2022-03-25 NOTE — PLAN OF CARE
Problem: Depression  Goal: Improved Mood  Outcome: Ongoing, Progressing     Patient slept a total of 10 hours without any interruptions. No behavioral concerns noted the whole shift.

## 2022-03-25 NOTE — PLAN OF CARE
"Goal Outcome Evaluation:    Plan of Care Reviewed With: patient     Patient spent the shift in his room. He is isolative and withdrawn. His affect is flat. His mood is calm. He denies AH and VH. He denies SI and SIB. He denies depression and anxiety. He is alert to self only. He stated the date is \"June 8th, 2099\". He does not believe he is in a hospital. He endorses frustration \"I just want to get the fuck out of here\". He refused to attend groups. He is not social with peers and staff. His appearance is disheveled with poor grooming. He refused to take a shower. His sleep is good. He ate 100% of his dinner with good oral intake. He denies pain. He is medication compliant.                 "

## 2022-03-25 NOTE — PROGRESS NOTES
"PSYCHIATRY  PROGRESS NOTE     DATE OF SERVICE   03/25/2022       CHIEF COMPLAINT   \"When I am getting the fuck out of here\"       SUBJECTIVE   Nursing reports: Plan of Care Reviewed With: patient      Patient spent the shift in his room. He is isolative and withdrawn. His affect is flat. His mood is calm. He denies AH and VH. He denies SI and SIB. He denies depression and anxiety. He is alert to self only. He stated the date is \"June 8th, 2099\". He does not believe he is in a hospital. He endorses frustration \"I just want to get the fuck out of here\". He refused to attend groups. He is not social with peers and staff. His appearance is disheveled with poor grooming. He refused to take a shower. His sleep is good. He ate 100% of his dinner with good oral intake. He denies pain. He is medication compliant.     Patient has been discussed with the  during team meeting.  There has been no updates about the guardianship process.     OBJECTIVE   Today patient was seen and evaluated at bedside by himself, this was a face-to-face evaluation.  Patient continues to be at his baseline constantly, asking when he will be leaving the hospital but unable to engage in a conversation about his discharge plans.  Today patient presented as irritable and dismissive.  He has poor understanding of the information provided to him and continues to experience significant cognitive problems.  Has continued to denied all psychiatric symptoms that he has been observed talking to himself.       MEDICATIONS   Medications:  Scheduled Meds:    aspirin  81 mg Oral Daily     [Held by provider] atorvastatin  80 mg Oral At Bedtime     busPIRone HCl  30 mg Oral BID     cloZAPine  200 mg Oral At Bedtime     donepezil  10 mg Oral At Bedtime     gabapentin  100 mg Oral TID w/meals     haloperidol  1 mg Oral At Bedtime     levothyroxine  88 mcg Oral Daily     megestrol  20 mg Oral Daily     melatonin  5 mg Oral At Bedtime     memantine  10 mg " "Oral BID     mirtazapine  15 mg Oral At Bedtime     [Held by provider] polyethylene glycol  17 g Oral BID     salmeterol  1 puff Inhalation BID     Continuous Infusions:  PRN Meds:.[Held by provider] acetaminophen, albuterol, alum & mag hydroxide-simethicone, benzocaine-menthol, hydrOXYzine, nicotine, polyethylene glycol-propylene glycol PF, risperiDONE, senna-docusate, [Held by provider] traZODone    Medication adherence issues: MS Med Adherence Y/N: Yes, Hospitalization  Medication side effects: MEDICATION SIDE EFFECTS: no side effects reported  Benefit: Yes / No: Yes       ROS   A comprehensive review of systems was negative.       MENTAL STATUS EXAM   Vitals: /75 (BP Location: Right arm, Patient Position: Supine, Cuff Size: Adult Regular)   Pulse 100   Temp 97.1  F (36.2  C) (Tympanic)   Resp 16   Ht 1.778 m (5' 10\")   Wt 75.4 kg (166 lb 4.8 oz)   SpO2 96%   BMI 23.86 kg/m      Appearance:  No apparent distress  Mood: \"I am okay\"  Affect: Irritable and blunted  Suicidal Ideation: denies   Homicidal Ideation: denies   Thought process: concrete  Thought content: confused   Fund of Knowledge: Below average  Attention/Concentration: Easily distracted  Language ability: Significantly impaired  Memory: Global memory impairment  Insight:  Poor.  Judgement: Poor  Orientation: Only to person  Psychomotor Behavior: slowed    Muscle Strength and Tone: MuscleStrength: Normal and Atrophy  Gait and Station: Not evaluated       LABS   personally reviewed.     No results found for: PHENYTOIN, PHENOBARB, VALPROATE, CBMZ       DIAGNOSIS   Principal Problem:    Major neurocognitive disorder, due to multiple etiologies, with behavioral disturbance, severe (H)    Active Problem List:  Patient Active Problem List   Diagnosis     TBI with aggressive behavior     HTN (hypertension)     COPD (chronic obstructive pulmonary disease) (H)     Dementia with behavioral disturbance (H)     Chronic schizophrenia (H)     Smoker     " Agitation     Behavior disturbance     Psychosis (H)     Major neurocognitive disorder, due to multiple etiologies, with behavioral disturbance, severe (H)     Paranoid schizophrenia, chronic condition with acute exacerbation (H)          PLAN   1. Ongoing education given regarding diagnostic and treatment options with risks, benefits and alternatives and adequate verbalization of understanding.  2.  Medications       BuSpar 30 mg 2 times daily       Clozaril 200 mg at bedtime       Aricept 10 mg at bedtime       Gabapentin 100 mg 3 times a day       Haldol 1 mg at bedtime       Namenda 10 mg 2 times a day       Remeron 15 mg at bedtime  3.  Medical team following the patient as needed   4.   coordinating a safe discharge plan with the patient.    Risk Assessment: [facility] RISK ASSESSMENT: Patient able to contract for safety    Coordination of Care:   Treatment Plan reviewed and physician signed, Care discussed with Care/Treatment Team Members, Chart reviewed and Patient seen      Re-Certification I certify that the inpatient psychiatric facility services furnished since the previous certification were, and continue to be, medically necessary for, either, treatment which could reasonably be expected to improve the patient s condition or diagnostic study and that the hospital records indicate that the services furnished were, either, intensive treatment services, admission and related services necessary for diagnostic study, or equivalent services.     I certify that the patient continues to need, on a daily basis, active treatment furnished directly by or requiring the supervision of inpatient psychiatric facility personnel.   I estimate 14 days of hospitalization is necessary for proper treatment of the patient. My plans for post-hospital care for this patient are  TBD     Ginger Dubon MD    -     03/25/2022  -     2:34 PM    Total time 15 minutes with > 50%spent on coordination of cares and  psycho-education.    This note was created with help of Dragon dictation system. Grammatical / typing errors are not intentional.    Ginger Dubon MD

## 2022-03-26 PROCEDURE — 250N000013 HC RX MED GY IP 250 OP 250 PS 637: Performed by: PSYCHIATRY & NEUROLOGY

## 2022-03-26 PROCEDURE — 124N000003 HC R&B MH SENIOR/ADOLESCENT

## 2022-03-26 RX ADMIN — GABAPENTIN 100 MG: 100 CAPSULE ORAL at 09:23

## 2022-03-26 RX ADMIN — MEGESTROL ACETATE 20 MG: 20 TABLET ORAL at 09:24

## 2022-03-26 RX ADMIN — MEMANTINE 10 MG: 10 TABLET ORAL at 09:24

## 2022-03-26 RX ADMIN — CLOZAPINE 200 MG: 100 TABLET ORAL at 20:12

## 2022-03-26 RX ADMIN — DONEPEZIL HYDROCHLORIDE 10 MG: 10 TABLET ORAL at 20:12

## 2022-03-26 RX ADMIN — Medication 5 MG: at 20:12

## 2022-03-26 RX ADMIN — MIRTAZAPINE 15 MG: 15 TABLET, FILM COATED ORAL at 20:12

## 2022-03-26 RX ADMIN — BUSPIRONE HYDROCHLORIDE 30 MG: 15 TABLET ORAL at 20:12

## 2022-03-26 RX ADMIN — LEVOTHYROXINE SODIUM 88 MCG: 0.09 TABLET ORAL at 09:24

## 2022-03-26 RX ADMIN — BUSPIRONE HYDROCHLORIDE 30 MG: 15 TABLET ORAL at 09:23

## 2022-03-26 RX ADMIN — SALMETEROL XINAFOATE 1 PUFF: 50 POWDER, METERED ORAL; RESPIRATORY (INHALATION) at 20:12

## 2022-03-26 RX ADMIN — SALMETEROL XINAFOATE 1 PUFF: 50 POWDER, METERED ORAL; RESPIRATORY (INHALATION) at 09:23

## 2022-03-26 RX ADMIN — GABAPENTIN 100 MG: 100 CAPSULE ORAL at 17:17

## 2022-03-26 RX ADMIN — HALOPERIDOL 1 MG: 1 TABLET ORAL at 20:12

## 2022-03-26 RX ADMIN — GABAPENTIN 100 MG: 100 CAPSULE ORAL at 12:16

## 2022-03-26 RX ADMIN — ASPIRIN 81 MG: 81 TABLET, COATED ORAL at 09:23

## 2022-03-26 RX ADMIN — MEMANTINE 10 MG: 10 TABLET ORAL at 20:12

## 2022-03-26 ASSESSMENT — ACTIVITIES OF DAILY LIVING (ADL)
HYGIENE/GROOMING: PROMPTS
HYGIENE/GROOMING: INDEPENDENT;PROMPTS
LAUNDRY: UNABLE TO COMPLETE
DRESS: SCRUBS (BEHAVIORAL HEALTH);INDEPENDENT
ORAL_HYGIENE: INDEPENDENT;PROMPTS
ORAL_HYGIENE: PROMPTS
LAUNDRY: UNABLE TO COMPLETE
DRESS: SCRUBS (BEHAVIORAL HEALTH)

## 2022-03-26 NOTE — PLAN OF CARE
Problem: Sleep Disturbance (Psychotic Signs/Symptoms)  Goal: Improved Sleep (Psychotic Signs/Symptoms)  Outcome: Met    Patient slept a total of 11 hours without any interruptions. No behavioral concerns noted the whole shift.

## 2022-03-26 NOTE — PLAN OF CARE
Problem: Behavior Regulation Impairment (Psychotic Signs/Symptoms)  Goal: Improved Behavioral Control (Psychotic Signs/Symptoms)  Outcome: Ongoing, Progressing   Goal Outcome Evaluation:    Plan of Care Reviewed With: patient     Patient is calm cooperative, medication compliant. Oriented to self, voices frustration with hospital stay and would like to discharge. Patient endorses auditory hallucinations reports hearing his sister and Mother. Denies SI. SIB, anxiety and depression. Fair appetite, ate 50% of meals. Not social with staff or peers, comes out for meals otherwise isolates in room. Declined shower and assistance with ADLs. Reports no pain, no other concerns at this time.

## 2022-03-27 PROCEDURE — 250N000013 HC RX MED GY IP 250 OP 250 PS 637: Performed by: PSYCHIATRY & NEUROLOGY

## 2022-03-27 PROCEDURE — 124N000003 HC R&B MH SENIOR/ADOLESCENT

## 2022-03-27 RX ADMIN — DONEPEZIL HYDROCHLORIDE 10 MG: 10 TABLET ORAL at 20:10

## 2022-03-27 RX ADMIN — GABAPENTIN 100 MG: 100 CAPSULE ORAL at 17:12

## 2022-03-27 RX ADMIN — LEVOTHYROXINE SODIUM 88 MCG: 0.09 TABLET ORAL at 08:24

## 2022-03-27 RX ADMIN — Medication 5 MG: at 20:09

## 2022-03-27 RX ADMIN — MIRTAZAPINE 15 MG: 15 TABLET, FILM COATED ORAL at 20:10

## 2022-03-27 RX ADMIN — BUSPIRONE HYDROCHLORIDE 30 MG: 15 TABLET ORAL at 20:09

## 2022-03-27 RX ADMIN — SALMETEROL XINAFOATE 1 PUFF: 50 POWDER, METERED ORAL; RESPIRATORY (INHALATION) at 20:10

## 2022-03-27 RX ADMIN — ASPIRIN 81 MG: 81 TABLET, COATED ORAL at 08:24

## 2022-03-27 RX ADMIN — SALMETEROL XINAFOATE 1 PUFF: 50 POWDER, METERED ORAL; RESPIRATORY (INHALATION) at 08:24

## 2022-03-27 RX ADMIN — CLOZAPINE 200 MG: 100 TABLET ORAL at 20:10

## 2022-03-27 RX ADMIN — HALOPERIDOL 1 MG: 1 TABLET ORAL at 20:10

## 2022-03-27 RX ADMIN — MEMANTINE 10 MG: 10 TABLET ORAL at 08:24

## 2022-03-27 RX ADMIN — GABAPENTIN 100 MG: 100 CAPSULE ORAL at 08:24

## 2022-03-27 RX ADMIN — MEMANTINE 10 MG: 10 TABLET ORAL at 20:10

## 2022-03-27 RX ADMIN — GABAPENTIN 100 MG: 100 CAPSULE ORAL at 12:05

## 2022-03-27 RX ADMIN — MEGESTROL ACETATE 20 MG: 20 TABLET ORAL at 08:24

## 2022-03-27 RX ADMIN — BUSPIRONE HYDROCHLORIDE 30 MG: 15 TABLET ORAL at 08:24

## 2022-03-27 ASSESSMENT — ACTIVITIES OF DAILY LIVING (ADL)
HYGIENE/GROOMING: PROMPTS;INDEPENDENT
ORAL_HYGIENE: PROMPTS;INDEPENDENT
DRESS: SCRUBS (BEHAVIORAL HEALTH)
LAUNDRY: UNABLE TO COMPLETE
ORAL_HYGIENE: PROMPTS;INDEPENDENT
DRESS: PROMPTS;INDEPENDENT;WITH SUPERVISION
HYGIENE/GROOMING: PROMPTS;INDEPENDENT

## 2022-03-27 NOTE — PLAN OF CARE
Problem: Sleep Disturbance (Psychotic Signs/Symptoms)  Goal: Improved Sleep (Psychotic Signs/Symptoms)  Outcome: Met     Patient slept throughout the night, a total of 11.5 hours. No behavioral issues raised the whole shift.

## 2022-03-27 NOTE — PLAN OF CARE
Problem: Behavior Regulation Impairment (Psychotic Signs/Symptoms)  Goal: Improved Behavioral Control (Psychotic Signs/Symptoms)  Outcome: Ongoing, Progressing   Goal Outcome Evaluation:    Plan of Care Reviewed With: patient     Patient is calm cooperative, affect flat. Medication compliant. Denies all mental health symptoms, voices desire to discharge. Patient is withdrawn, isolates to room. Comes out for meals, good appetite ate 100% of breakfast and 75% of lunch. Did not attend groups, not social with peers or staff. Patients appearance is disheveled, refuses to shower. Allowed staff to briefly comb his hair prior to breakfast.

## 2022-03-27 NOTE — PLAN OF CARE
Goal Outcome Evaluation:    Plan of Care Reviewed With: patient     Patient spent the shift in his room coming out only for dinner. His affect is flat. His mood is calm. He denies SI and SIB. He denies AH and VH. He denies depression and anxiety. He is not social with peers and staff. He refused to attend groups. He refused to shower. He is medication compliant. He denies pain. He is alert to self only. He ate 100% of his dinner with good fluid intake.

## 2022-03-28 PROCEDURE — 124N000003 HC R&B MH SENIOR/ADOLESCENT

## 2022-03-28 PROCEDURE — 250N000013 HC RX MED GY IP 250 OP 250 PS 637: Performed by: PHYSICIAN ASSISTANT

## 2022-03-28 PROCEDURE — 250N000013 HC RX MED GY IP 250 OP 250 PS 637: Performed by: PSYCHIATRY & NEUROLOGY

## 2022-03-28 PROCEDURE — 99231 SBSQ HOSP IP/OBS SF/LOW 25: CPT | Performed by: PSYCHIATRY & NEUROLOGY

## 2022-03-28 RX ORDER — POLYETHYLENE GLYCOL 3350 17 G/17G
17 POWDER, FOR SOLUTION ORAL 2 TIMES DAILY PRN
Status: DISCONTINUED | OUTPATIENT
Start: 2022-03-28 | End: 2022-10-13 | Stop reason: HOSPADM

## 2022-03-28 RX ADMIN — MEMANTINE 10 MG: 10 TABLET ORAL at 08:50

## 2022-03-28 RX ADMIN — Medication 5 MG: at 19:33

## 2022-03-28 RX ADMIN — MIRTAZAPINE 15 MG: 15 TABLET, FILM COATED ORAL at 19:33

## 2022-03-28 RX ADMIN — GABAPENTIN 100 MG: 100 CAPSULE ORAL at 12:48

## 2022-03-28 RX ADMIN — BUSPIRONE HYDROCHLORIDE 30 MG: 15 TABLET ORAL at 08:50

## 2022-03-28 RX ADMIN — MEGESTROL ACETATE 20 MG: 20 TABLET ORAL at 08:51

## 2022-03-28 RX ADMIN — GABAPENTIN 100 MG: 100 CAPSULE ORAL at 17:39

## 2022-03-28 RX ADMIN — DONEPEZIL HYDROCHLORIDE 10 MG: 10 TABLET ORAL at 19:33

## 2022-03-28 RX ADMIN — POLYETHYLENE GLYCOL 3350 17 G: 17 POWDER, FOR SOLUTION ORAL at 12:49

## 2022-03-28 RX ADMIN — ASPIRIN 81 MG: 81 TABLET, COATED ORAL at 08:50

## 2022-03-28 RX ADMIN — CLOZAPINE 200 MG: 100 TABLET ORAL at 19:34

## 2022-03-28 RX ADMIN — LEVOTHYROXINE SODIUM 88 MCG: 0.09 TABLET ORAL at 08:50

## 2022-03-28 RX ADMIN — HALOPERIDOL 1 MG: 1 TABLET ORAL at 19:35

## 2022-03-28 RX ADMIN — MEMANTINE 10 MG: 10 TABLET ORAL at 19:34

## 2022-03-28 RX ADMIN — SALMETEROL XINAFOATE 1 PUFF: 50 POWDER, METERED ORAL; RESPIRATORY (INHALATION) at 20:53

## 2022-03-28 RX ADMIN — SALMETEROL XINAFOATE 1 PUFF: 50 POWDER, METERED ORAL; RESPIRATORY (INHALATION) at 08:51

## 2022-03-28 RX ADMIN — GABAPENTIN 100 MG: 100 CAPSULE ORAL at 08:50

## 2022-03-28 RX ADMIN — BUSPIRONE HYDROCHLORIDE 30 MG: 15 TABLET ORAL at 19:33

## 2022-03-28 ASSESSMENT — ACTIVITIES OF DAILY LIVING (ADL)
HYGIENE/GROOMING: PROMPTS;WITH ASSISTANCE
DRESS: PROMPTS;SCRUBS (BEHAVIORAL HEALTH)
ORAL_HYGIENE: PROMPTS;WITH ASSISTANCE

## 2022-03-28 NOTE — PLAN OF CARE
"Goal Outcome Evaluation:    Plan of Care Reviewed With: patient     Patient has been isolative and withdrawn. He only came out of his room for dinner. He ate 100% with good fluid intake. His affect is flat. His mood is calm. He denies SI and SIB. He endorses AH. When this writer asked what the voices were saying he stated \"nothing\". He endorses depression stating \"when am I gonna get out of the this fucking place, I'm gonna call the  to get me out of here\". He endorses anxiety. He is alert to self only stating \"I'm at [facility] right?. When asked what year it was he stated \"I don't know!\". Patient was oriented to place, date and time. He is medication compliant. He is disheveled with poor grooming. He refused to take a shower. He refused to attend groups. He is not social with peers and staff. He denies pain.                 "

## 2022-03-28 NOTE — PROGRESS NOTES
Brief Medicine Note    Contacted by RN regarding held medications (Miralax, Atorvastatin).     Per chart review, Atorvastatin is on hold due to transaminitis in setting of hepatitis C and hepatosteatosis vs cirrhosis. Most recent LFTs on 3/18 improved with  (163) and  (209). T.Bili and Alk Phos wnl. Last lipid panel (7/2021) wnl aside from low HDL. Continue to hold statin given LFT elevations.     Unclear why Miralax was held. Presumably due to prior loose stools. Per RN, unknown date of last BM. Will resume PRN for now and monitor bowel habits throughout the week, can schedule if needed.    Ora Gonsalves PA-C  Hospitalist Service

## 2022-03-28 NOTE — PROGRESS NOTES
"PSYCHIATRY  PROGRESS NOTE     DATE OF SERVICE   03/28/2022       CHIEF COMPLAINT   \"I am all right\"       SUBJECTIVE   Nursing reports: Patient has been isolative and withdrawn. He only came out of his room for dinner. He ate 100% with good fluid intake. His affect is flat. His mood is calm. He denies SI and SIB. He endorses AH. When this writer asked what the voices were saying he stated \"nothing\". He endorses depression stating \"when am I gonna get out of the this fucking place, I'm gonna call the  to get me out of here\". He endorses anxiety. He is alert to self only stating \"I'm at [facility] right?. When asked what year it was he stated \"I don't know!\". Patient was oriented to place, date and time. He is medication compliant. He is disheveled with poor grooming. He refused to take a shower. He refused to attend groups. He is not social with peers and staff. He denies pain.     Patient has been discussed with the  during team meeting.  There has been no updates about the guardianship process.     OBJECTIVE   Today patient was seen and evaluated at bedside by himself, this was a face-to-face evaluation.  Patient reported that he is doing all right and he denies all psychiatric symptoms.  Patient has been observed talking to himself but he denied having any auditory hallucinations.  Continues to ask about the discharge plan but he has poor understanding of the information provided to him.  Has continued to denied having any suicidal ideations and he is shayan for safety.     MEDICATIONS   Medications:  Scheduled Meds:    aspirin  81 mg Oral Daily     [Held by provider] atorvastatin  80 mg Oral At Bedtime     busPIRone HCl  30 mg Oral BID     cloZAPine  200 mg Oral At Bedtime     donepezil  10 mg Oral At Bedtime     gabapentin  100 mg Oral TID w/meals     haloperidol  1 mg Oral At Bedtime     levothyroxine  88 mcg Oral Daily     megestrol  20 mg Oral Daily     melatonin  5 mg Oral At " "Bedtime     memantine  10 mg Oral BID     mirtazapine  15 mg Oral At Bedtime     salmeterol  1 puff Inhalation BID     Continuous Infusions:  PRN Meds:.[Held by provider] acetaminophen, albuterol, alum & mag hydroxide-simethicone, benzocaine-menthol, hydrOXYzine, nicotine, polyethylene glycol, polyethylene glycol-propylene glycol PF, risperiDONE, senna-docusate, [Held by provider] traZODone    Medication adherence issues: MS Med Adherence Y/N: Yes, Hospitalization  Medication side effects: MEDICATION SIDE EFFECTS: no side effects reported  Benefit: Yes / No: Yes       ROS   A comprehensive review of systems was negative.       MENTAL STATUS EXAM   Vitals: /79 (BP Location: Right arm, Patient Position: Supine, Cuff Size: Adult Small)   Pulse 64   Temp 98.7  F (37.1  C) (Oral)   Resp 16   Ht 1.778 m (5' 10\")   Wt 75.4 kg (166 lb 4.8 oz)   SpO2 98%   BMI 23.86 kg/m      Appearance:  No apparent distress  Mood: \"I am all right\"  Affect: Calm  Suicidal Ideation: denies   Homicidal Ideation: denies   Thought process: concrete  Thought content: confused   Fund of Knowledge: Below average  Attention/Concentration: Easily distracted  Language ability: Significantly impaired  Memory: Global memory impairment  Insight:  Poor.  Judgement: Poor  Orientation: Only to person  Psychomotor Behavior: slowed    Muscle Strength and Tone: MuscleStrength: Normal and Atrophy  Gait and Station: Not evaluated       LABS   personally reviewed.     No results found for: PHENYTOIN, PHENOBARB, VALPROATE, CBMZ       DIAGNOSIS   Principal Problem:    Major neurocognitive disorder, due to multiple etiologies, with behavioral disturbance, severe (H)    Active Problem List:  Patient Active Problem List   Diagnosis     TBI with aggressive behavior     HTN (hypertension)     COPD (chronic obstructive pulmonary disease) (H)     Dementia with behavioral disturbance (H)     Chronic schizophrenia (H)     Smoker     Agitation     Behavior " disturbance     Psychosis (H)     Major neurocognitive disorder, due to multiple etiologies, with behavioral disturbance, severe (H)     Paranoid schizophrenia, chronic condition with acute exacerbation (H)          PLAN   1. Ongoing education given regarding diagnostic and treatment options with risks, benefits and alternatives and adequate verbalization of understanding.  2.  Medications       BuSpar 30 mg 2 times daily       Clozaril 200 mg at bedtime       Aricept 10 mg at bedtime       Gabapentin 100 mg 3 times a day       Haldol 1 mg at bedtime       Namenda 10 mg 2 times a day       Remeron 15 mg at bedtime  3.  Medical team following the patient as needed   4.   coordinating a safe discharge plan with the patient.    Risk Assessment: [facility] RISK ASSESSMENT: Patient able to contract for safety    Coordination of Care:   Treatment Plan reviewed and physician signed, Care discussed with Care/Treatment Team Members, Chart reviewed and Patient seen      Re-Certification I certify that the inpatient psychiatric facility services furnished since the previous certification were, and continue to be, medically necessary for, either, treatment which could reasonably be expected to improve the patient s condition or diagnostic study and that the hospital records indicate that the services furnished were, either, intensive treatment services, admission and related services necessary for diagnostic study, or equivalent services.     I certify that the patient continues to need, on a daily basis, active treatment furnished directly by or requiring the supervision of inpatient psychiatric facility personnel.   I estimate 14 days of hospitalization is necessary for proper treatment of the patient. My plans for post-hospital care for this patient are  TBD     Ginger Dubon MD    -     03/28/2022  -     4:04 PM    Total time 15 minutes with > 50%spent on coordination of cares and  psycho-education.    This note was created with help of Dragon dictation system. Grammatical / typing errors are not intentional.    Ginger Dubon MD

## 2022-03-28 NOTE — PLAN OF CARE
"Goal Outcome Evaluation:  Problem: Social, Occupational or Functional Impairment (Psychotic Signs/Symptoms)  Goal: Enhanced Social, Occupational or Functional Skills (Psychotic Signs/Symptoms)  Intervention: Promote Social, Occupational and Functional Ability  Recent Flowsheet Documentation  Taken 3/28/2022 1700 by Navya Terry RN  Trust Relationship/Rapport:    care explained    reassurance provided    thoughts/feelings acknowledged     Patient's affect is flat and blunt. Admits that he feels anxious and depressed. Rated both anxiety and depression at 5 from a range of 1-10. He said his anxiety stems from the thought that he wants to get out of here. He said, \"I am sick and tired of staying here. I've been staying here for the last 18 years!\" Denies feeling suicidal nor having suicidal thoughts. Patient was encouraged to express more on his thoughts and feelings. Reassurance was provided. He was encouraged to join the community meeting, but he refused. Stayed lying in bed and went back to sleep.     Patient appears very untidy and disheveled with uncombed hair.  Refused to have shower. He said, \"Maybe, tomorrow.\" Was advised to at least comb his hair. Went to the bathroom and voided.     He ate dinner with good appetite. Consumed 100% of his meal. Right after eating, he got up at once and walked hurriedly  back to his room. Went to the bathroom the second time and almost jumped into his bed.     BP-105/70, P-105, R-18,  T- 98.5 and O2 sat-97% @ R.A.   Bedtime medications were all taken by the patient as scheduled . He went to bed @ 8:00 pm.        No bowel movement noted the whole shift.                 "

## 2022-03-28 NOTE — PLAN OF CARE
Problem: Mood Impairment (Psychotic Signs/Symptoms)  Goal: Improved Mood Symptoms (Psychotic Signs/Symptoms)  Outcome: Ongoing, Progressing   Goal Outcome Evaluation:             Slept well for 11.25 hours  No complaints made, independent in repositioning in bed

## 2022-03-28 NOTE — PLAN OF CARE
Problem: Psychomotor Impairment (Psychotic Signs/Symptoms)  Goal: Improved Psychomotor Symptoms (Psychotic Signs/Symptoms)  Intervention: Manage Psychomotor Movement  Recent Flowsheet Documentation  Taken 3/28/2022 0900 by Una Gaytan RN  Patient Performed Hygiene: patient refused  Activity (Behavioral Health): up ad holley     Problem: Suicidal Behavior  Goal: Suicidal Behavior is Absent or Managed  Outcome: Met     Problem: Mood Impairment (Psychotic Signs/Symptoms)  Goal: Improved Mood Symptoms (Psychotic Signs/Symptoms)  Outcome: Ongoing, Progressing     Problem: Behavior Regulation Impairment (Psychotic Signs/Symptoms)  Goal: Improved Behavioral Control (Psychotic Signs/Symptoms)  Outcome: Ongoing, Progressing   Goal Outcome Evaluation:    Pt continues to isolate to his room. Pt is oriented to himself and a hospital. Pt did state that he was in  [place] this AM. Pt's affect remains flat. Pt has been calm.  Pt has been out for meals only. Pt ate 50-75 % of his trays this shift. Fluids have been encouraged.   Pt was directed to change scrubs and perform jeanie care. Pt linen was also changed.   Pt has been medication compliant.   Prn miralax was administered as patient was unable to state when his last BM was and there has been no recent documentation.

## 2022-03-29 PROCEDURE — 250N000013 HC RX MED GY IP 250 OP 250 PS 637: Performed by: PSYCHIATRY & NEUROLOGY

## 2022-03-29 PROCEDURE — 99231 SBSQ HOSP IP/OBS SF/LOW 25: CPT | Performed by: PSYCHIATRY & NEUROLOGY

## 2022-03-29 PROCEDURE — 124N000003 HC R&B MH SENIOR/ADOLESCENT

## 2022-03-29 PROCEDURE — 250N000013 HC RX MED GY IP 250 OP 250 PS 637: Performed by: PHYSICIAN ASSISTANT

## 2022-03-29 RX ADMIN — MEGESTROL ACETATE 20 MG: 20 TABLET ORAL at 08:24

## 2022-03-29 RX ADMIN — ASPIRIN 81 MG: 81 TABLET, COATED ORAL at 08:24

## 2022-03-29 RX ADMIN — MEMANTINE 10 MG: 10 TABLET ORAL at 08:24

## 2022-03-29 RX ADMIN — LEVOTHYROXINE SODIUM 88 MCG: 0.09 TABLET ORAL at 08:24

## 2022-03-29 RX ADMIN — GABAPENTIN 100 MG: 100 CAPSULE ORAL at 12:46

## 2022-03-29 RX ADMIN — MEMANTINE 10 MG: 10 TABLET ORAL at 20:07

## 2022-03-29 RX ADMIN — GABAPENTIN 100 MG: 100 CAPSULE ORAL at 08:24

## 2022-03-29 RX ADMIN — POLYETHYLENE GLYCOL 3350 17 G: 17 POWDER, FOR SOLUTION ORAL at 14:02

## 2022-03-29 RX ADMIN — MIRTAZAPINE 15 MG: 15 TABLET, FILM COATED ORAL at 20:07

## 2022-03-29 RX ADMIN — BUSPIRONE HYDROCHLORIDE 30 MG: 15 TABLET ORAL at 20:07

## 2022-03-29 RX ADMIN — HALOPERIDOL 1 MG: 1 TABLET ORAL at 20:07

## 2022-03-29 RX ADMIN — CLOZAPINE 200 MG: 100 TABLET ORAL at 20:07

## 2022-03-29 RX ADMIN — SALMETEROL XINAFOATE 1 PUFF: 50 POWDER, METERED ORAL; RESPIRATORY (INHALATION) at 20:08

## 2022-03-29 RX ADMIN — DONEPEZIL HYDROCHLORIDE 10 MG: 10 TABLET ORAL at 20:07

## 2022-03-29 RX ADMIN — SALMETEROL XINAFOATE 1 PUFF: 50 POWDER, METERED ORAL; RESPIRATORY (INHALATION) at 08:24

## 2022-03-29 RX ADMIN — BUSPIRONE HYDROCHLORIDE 30 MG: 15 TABLET ORAL at 08:24

## 2022-03-29 RX ADMIN — GABAPENTIN 100 MG: 100 CAPSULE ORAL at 17:50

## 2022-03-29 RX ADMIN — Medication 5 MG: at 20:07

## 2022-03-29 ASSESSMENT — ACTIVITIES OF DAILY LIVING (ADL)
LAUNDRY: UNABLE TO COMPLETE
DRESS: PROMPTS
ORAL_HYGIENE: PROMPTS
HYGIENE/GROOMING: PROMPTS

## 2022-03-29 NOTE — PLAN OF CARE
Assessment/Intervention/Current Symtoms and Care Coordination:  -Reviewed pt's record to get up to date with his current status and plan  -Rounded with team    -Sunshboy  and RN will interview the patient in person 3/29 at 10 am.      Discharge Plan or Goal:  Discharge to [facility] homes when guardianship have been established.  LSS is working on streamlining guardianship     Barriers to Discharge:  History of aggression and elopement.  Lack of current guardian to make health decisions.   Several referrals made without acceptance      Referral Status:  None today

## 2022-03-29 NOTE — PLAN OF CARE
Problem: Sleep Disturbance (Psychotic Signs/Symptoms)  Goal: Improved Sleep (Psychotic Signs/Symptoms)  Outcome: Ongoing, Progressing   Goal Outcome Evaluation:      Slept well for 9.5 hours  Independent with repositioning in bed  No concerns raised

## 2022-03-29 NOTE — PLAN OF CARE
Assessment/Intervention/Current Symtoms and Care Coordination:  -Reviewed pt's record to get up to date with his current status and plan  -Rounded with team     -Sunshine  and RN interviewed the patient in person today at 10 am. They accepted the patient pending guardianship and CADI waiver approval.      Discharge Plan or Goal:  Discharge to [facility] homes when guardianship have been established.  LSS is working on streamlining guardianship     Barriers to Discharge:  History of aggression and elopement.  Lack of current guardian to make health decisions.   Several referrals made without acceptance      Referral Status:  None today

## 2022-03-29 NOTE — PLAN OF CARE
"  Problem: Cognitive Impairment (Psychotic Signs/Symptoms)  Goal: Optimal Cognitive Function (Psychotic Signs/Symptoms)  Outcome: Ongoing, Not Progressing     Problem: Behavior Regulation Impairment (Psychotic Signs/Symptoms)  Goal: Improved Behavioral Control (Psychotic Signs/Symptoms)  Outcome: Ongoing, Progressing    Patient is calm cooperative, medication compliant. Affect is blunted and flat. Patient is isolative and withdrawn, did not attend groups. Good appetite, ate 100% of meals. Denies SI, SIB, anxiety, depression and hallucinations. Writer encouraged patient to perform ADLs and shower, patient stated \"Not right now I'm tired\".  Patient reports LBM 4-5 days ago, prn miralax given.          "

## 2022-03-29 NOTE — PROGRESS NOTES
"PSYCHIATRY  PROGRESS NOTE     DATE OF SERVICE   03/29/2022       CHIEF COMPLAINT   \"I am all right\"       SUBJECTIVE   Nursing reports: Patient remains at his baseline and no new behaviors have been reported.    Patient has been discussed with the  during team meeting.  Patient will have an assessment for a nursing home in person today at 10 AM.     OBJECTIVE   Today patient was seen and evaluated at bedside by himself, this was a face-to-face evaluation.  Patient continues to report that he is doing \"all right\" and is denying all psychiatric symptoms.  The patient seemed very distracted and he was constantly talking to himself during the assessment.  He continues to be extremely confused and oriented only to person.     MEDICATIONS   Medications:  Scheduled Meds:    aspirin  81 mg Oral Daily     [Held by provider] atorvastatin  80 mg Oral At Bedtime     busPIRone HCl  30 mg Oral BID     cloZAPine  200 mg Oral At Bedtime     donepezil  10 mg Oral At Bedtime     gabapentin  100 mg Oral TID w/meals     haloperidol  1 mg Oral At Bedtime     levothyroxine  88 mcg Oral Daily     megestrol  20 mg Oral Daily     melatonin  5 mg Oral At Bedtime     memantine  10 mg Oral BID     mirtazapine  15 mg Oral At Bedtime     salmeterol  1 puff Inhalation BID     Continuous Infusions:  PRN Meds:.[Held by provider] acetaminophen, albuterol, alum & mag hydroxide-simethicone, benzocaine-menthol, hydrOXYzine, nicotine, polyethylene glycol, polyethylene glycol-propylene glycol PF, risperiDONE, senna-docusate, [Held by provider] traZODone    Medication adherence issues: MS Med Adherence Y/N: Yes, Hospitalization  Medication side effects: MEDICATION SIDE EFFECTS: no side effects reported  Benefit: Yes / No: Yes       ROS   A comprehensive review of systems was negative.       MENTAL STATUS EXAM   Vitals: /86 (BP Location: Left arm)   Pulse 106   Temp 98  F (36.7  C) (Oral)   Resp 16   Ht 1.778 m (5' 10\")   Wt 75.8 kg " "(167 lb 1.6 oz)   SpO2 100%   BMI 23.98 kg/m        Appearance:  No apparent distress  Mood: \"I am all right\"  Affect: Calm  Suicidal Ideation: denies   Homicidal Ideation: denies   Thought process: concrete  Thought content: confused and responding to internal stimuli  Fund of Knowledge: Below average  Attention/Concentration: Easily distracted  Language ability: Significantly impaired  Memory: Global memory impairment  Insight:  Poor.  Judgement: Poor  Orientation: Only to person  Psychomotor Behavior: slowed    Muscle Strength and Tone: MuscleStrength: Normal and Atrophy  Gait and Station: Not evaluated       LABS   personally reviewed.     No results found for: PHENYTOIN, PHENOBARB, VALPROATE, CBMZ       DIAGNOSIS   Principal Problem:    Major neurocognitive disorder, due to multiple etiologies, with behavioral disturbance, severe (H)    Active Problem List:  Patient Active Problem List   Diagnosis     TBI with aggressive behavior     HTN (hypertension)     COPD (chronic obstructive pulmonary disease) (H)     Dementia with behavioral disturbance (H)     Chronic schizophrenia (H)     Smoker     Agitation     Behavior disturbance     Psychosis (H)     Major neurocognitive disorder, due to multiple etiologies, with behavioral disturbance, severe (H)     Paranoid schizophrenia, chronic condition with acute exacerbation (H)          PLAN   1. Ongoing education given regarding diagnostic and treatment options with risks, benefits and alternatives and adequate verbalization of understanding.  2.  Medications       BuSpar 30 mg 2 times daily       Clozaril 200 mg at bedtime       Aricept 10 mg at bedtime       Gabapentin 100 mg 3 times a day       Haldol 1 mg at bedtime       Namenda 10 mg 2 times a day       Remeron 15 mg at bedtime  3.  Medical team following the patient as needed   4.   coordinating a safe discharge plan with the patient.    [facility]: Patient able to " contract for safety    Coordination of Care:   Treatment Plan reviewed and physician signed, Care discussed with Care/Treatment Team Members, Chart reviewed and Patient seen      Re-Certification I certify that the inpatient psychiatric facility services furnished since the previous certification were, and continue to be, medically necessary for, either, treatment which could reasonably be expected to improve the patient s condition or diagnostic study and that the hospital records indicate that the services furnished were, either, intensive treatment services, admission and related services necessary for diagnostic study, or equivalent services.     I certify that the patient continues to need, on a daily basis, active treatment furnished directly by or requiring the supervision of inpatient psychiatric facility personnel.   I estimate 14 days of hospitalization is necessary for proper treatment of the patient. My plans for post-hospital care for this patient are  TBD     Ginger Dubon MD    -     03/29/2022  -     2:33 PM    Total time 15 minutes with > 50%spent on coordination of cares and psycho-education.    This note was created with help of Dragon dictation system. Grammatical / typing errors are not intentional.    Ginger Dubon MD

## 2022-03-30 PROCEDURE — 124N000003 HC R&B MH SENIOR/ADOLESCENT

## 2022-03-30 PROCEDURE — 250N000013 HC RX MED GY IP 250 OP 250 PS 637: Performed by: PSYCHIATRY & NEUROLOGY

## 2022-03-30 PROCEDURE — 99231 SBSQ HOSP IP/OBS SF/LOW 25: CPT | Performed by: PSYCHIATRY & NEUROLOGY

## 2022-03-30 RX ADMIN — BUSPIRONE HYDROCHLORIDE 30 MG: 15 TABLET ORAL at 08:10

## 2022-03-30 RX ADMIN — BUSPIRONE HYDROCHLORIDE 30 MG: 15 TABLET ORAL at 20:28

## 2022-03-30 RX ADMIN — MEGESTROL ACETATE 20 MG: 20 TABLET ORAL at 08:10

## 2022-03-30 RX ADMIN — MEMANTINE 10 MG: 10 TABLET ORAL at 08:10

## 2022-03-30 RX ADMIN — LEVOTHYROXINE SODIUM 88 MCG: 0.09 TABLET ORAL at 08:10

## 2022-03-30 RX ADMIN — Medication 5 MG: at 20:29

## 2022-03-30 RX ADMIN — MEMANTINE 10 MG: 10 TABLET ORAL at 20:28

## 2022-03-30 RX ADMIN — SALMETEROL XINAFOATE 1 PUFF: 50 POWDER, METERED ORAL; RESPIRATORY (INHALATION) at 08:10

## 2022-03-30 RX ADMIN — CLOZAPINE 200 MG: 100 TABLET ORAL at 20:28

## 2022-03-30 RX ADMIN — DONEPEZIL HYDROCHLORIDE 10 MG: 10 TABLET ORAL at 20:28

## 2022-03-30 RX ADMIN — GABAPENTIN 100 MG: 100 CAPSULE ORAL at 12:29

## 2022-03-30 RX ADMIN — GABAPENTIN 100 MG: 100 CAPSULE ORAL at 17:21

## 2022-03-30 RX ADMIN — SALMETEROL XINAFOATE 1 PUFF: 50 POWDER, METERED ORAL; RESPIRATORY (INHALATION) at 20:29

## 2022-03-30 RX ADMIN — GABAPENTIN 100 MG: 100 CAPSULE ORAL at 08:10

## 2022-03-30 RX ADMIN — HALOPERIDOL 1 MG: 1 TABLET ORAL at 20:29

## 2022-03-30 RX ADMIN — MIRTAZAPINE 15 MG: 15 TABLET, FILM COATED ORAL at 20:28

## 2022-03-30 RX ADMIN — ASPIRIN 81 MG: 81 TABLET, COATED ORAL at 08:10

## 2022-03-30 ASSESSMENT — ACTIVITIES OF DAILY LIVING (ADL)
ORAL_HYGIENE: PROMPTS
HYGIENE/GROOMING: PROMPTS;INDEPENDENT
LAUNDRY: UNABLE TO COMPLETE
LAUNDRY: UNABLE TO COMPLETE
HYGIENE/GROOMING: PROMPTS
ORAL_HYGIENE: PROMPTS;INDEPENDENT
DRESS: SCRUBS (BEHAVIORAL HEALTH);INDEPENDENT
DRESS: PROMPTS

## 2022-03-30 NOTE — PLAN OF CARE
Problem: Depression  Goal: Improved Mood  Outcome: Ongoing, Progressing   Goal Outcome Evaluation:        Slept well for 8.25 hours  No concerns raised   Independent in repositioning self in bed

## 2022-03-30 NOTE — PLAN OF CARE
"Pt seems to be at baseline, he was isolative for a majority of the shift but did play bingo with peers and ate dinner in the lounge area. He denies mental health symptoms upon assessment. Pt is med compliant without issue, no PRNs given. Pt states his mood is alright, he \"just wants to get the hell out of here.\" No other concerns at this time.    Problem: Suicidal Behavior  Goal: Suicidal Behavior is Absent or Managed  Outcome: Ongoing, Progressing   Goal Outcome Evaluation:                      "

## 2022-03-30 NOTE — PROGRESS NOTES
"PSYCHIATRY  PROGRESS NOTE     DATE OF SERVICE   03/30/2022       CHIEF COMPLAINT   \"I am all right\"       SUBJECTIVE   Nursing reports: Patient continues to be at his baseline, he is isolative for the majority of the time.  Has been eating between 75% and 100% of his meals.  Denies all psychiatric symptoms and he is compliant with his medication.    Patient has been discussed with the  during team meeting.  Patient will have an assessment for a nursing home.  We are still waiting for guardianship to be approved once this is done the CADI waiver will be reopened.     OBJECTIVE   Today patient was seen and evaluated at bedside by himself, this was a face-to-face evaluation.  Patient remains pleasantly confused, responding to internal stimuli and calm.  Patient is insisting that he is leaving today.  Patient continues to deny all psychiatric symptoms and has been able to contract for safety.     MEDICATIONS   Medications:  Scheduled Meds:    aspirin  81 mg Oral Daily     [Held by provider] atorvastatin  80 mg Oral At Bedtime     busPIRone HCl  30 mg Oral BID     cloZAPine  200 mg Oral At Bedtime     donepezil  10 mg Oral At Bedtime     gabapentin  100 mg Oral TID w/meals     haloperidol  1 mg Oral At Bedtime     levothyroxine  88 mcg Oral Daily     megestrol  20 mg Oral Daily     melatonin  5 mg Oral At Bedtime     memantine  10 mg Oral BID     mirtazapine  15 mg Oral At Bedtime     salmeterol  1 puff Inhalation BID     Continuous Infusions:  PRN Meds:.[Held by provider] acetaminophen, albuterol, alum & mag hydroxide-simethicone, benzocaine-menthol, hydrOXYzine, nicotine, polyethylene glycol, polyethylene glycol-propylene glycol PF, risperiDONE, senna-docusate, [Held by provider] traZODone    Medication adherence issues: MS Med Adherence Y/N: Yes, Hospitalization  Medication side effects: MEDICATION SIDE EFFECTS: no side effects reported  Benefit: Yes / No: Yes       ROS   A comprehensive review of systems " "was negative.       MENTAL STATUS EXAM   Vitals: /82   Pulse (!) 122   Temp 97.2  F (36.2  C) (Temporal)   Resp 16   Ht 1.778 m (5' 10\")   Wt 75.8 kg (167 lb 1.6 oz)   SpO2 98%   BMI 23.98 kg/m        Appearance:  No apparent distress  Mood: \"I am all right\"  Affect: Calm and pleasant  Suicidal Ideation: denies   Homicidal Ideation: denies   Thought process: concrete  Thought content: confused and responding to internal stimuli  Fund of Knowledge: Below average  Attention/Concentration: Easily distracted  Language ability: Significantly impaired  Memory: Global memory impairment  Insight:  Poor.  Judgement: Poor  Orientation: Only to person  Psychomotor Behavior: slowed    Muscle Strength and Tone: MuscleStrength: Normal and Atrophy  Gait and Station: Not evaluated       LABS   personally reviewed.     No results found for: PHENYTOIN, PHENOBARB, VALPROATE, CBMZ       DIAGNOSIS   Principal Problem:    Major neurocognitive disorder, due to multiple etiologies, with behavioral disturbance, severe (H)    Active Problem List:  Patient Active Problem List   Diagnosis     TBI with aggressive behavior     HTN (hypertension)     COPD (chronic obstructive pulmonary disease) (H)     Dementia with behavioral disturbance (H)     Chronic schizophrenia (H)     Smoker     Agitation     Behavior disturbance     Psychosis (H)     Major neurocognitive disorder, due to multiple etiologies, with behavioral disturbance, severe (H)     Paranoid schizophrenia, chronic condition with acute exacerbation (H)          PLAN   1. Ongoing education given regarding diagnostic and treatment options with risks, benefits and alternatives and adequate verbalization of understanding.  2.  Medications       BuSpar 30 mg 2 times daily       Clozaril 200 mg at bedtime       Aricept 10 mg at bedtime       Gabapentin 100 mg 3 times a day       Haldol 1 mg at bedtime       Namenda 10 mg 2 times a day       Remeron 15 mg at bedtime  3.  Medical " team following the patient as needed   4.   coordinating a safe discharge plan with the patient.    Risk Assessment: [facility] RISK ASSESSMENT: Patient able to contract for safety    Coordination of Care:   Treatment Plan reviewed and physician signed, Care discussed with Care/Treatment Team Members, Chart reviewed and Patient seen      Re-Certification I certify that the inpatient psychiatric facility services furnished since the previous certification were, and continue to be, medically necessary for, either, treatment which could reasonably be expected to improve the patient s condition or diagnostic study and that the hospital records indicate that the services furnished were, either, intensive treatment services, admission and related services necessary for diagnostic study, or equivalent services.     I certify that the patient continues to need, on a daily basis, active treatment furnished directly by or requiring the supervision of inpatient psychiatric facility personnel.   I estimate 14 days of hospitalization is necessary for proper treatment of the patient. My plans for post-hospital care for this patient are  TBD     Ginger Dubon MD    -     03/30/2022  -     3:18 PM    Total time 15 minutes with > 50%spent on coordination of cares and psycho-education.    This note was created with help of Dragon dictation system. Grammatical / typing errors are not intentional.    Ginger Dubon MD

## 2022-03-30 NOTE — PLAN OF CARE
Problem: Mood Impairment (Psychotic Signs/Symptoms)  Goal: Improved Mood Symptoms (Psychotic Signs/Symptoms)  Outcome: Ongoing, Progressing   Goal Outcome Evaluation:    Plan of Care Reviewed With: patient     Calm cooperative, affect flat and blunted. Denies mental health concerns, voices desire to discharge. Isolative and withdrawn, did not attend groups. Came out for meals, ate 100% of breakfast and 75% of lunch.

## 2022-03-31 PROCEDURE — 99231 SBSQ HOSP IP/OBS SF/LOW 25: CPT | Performed by: PSYCHIATRY & NEUROLOGY

## 2022-03-31 PROCEDURE — 124N000003 HC R&B MH SENIOR/ADOLESCENT

## 2022-03-31 PROCEDURE — 250N000013 HC RX MED GY IP 250 OP 250 PS 637: Performed by: PSYCHIATRY & NEUROLOGY

## 2022-03-31 RX ADMIN — BUSPIRONE HYDROCHLORIDE 30 MG: 15 TABLET ORAL at 09:17

## 2022-03-31 RX ADMIN — MIRTAZAPINE 15 MG: 15 TABLET, FILM COATED ORAL at 20:04

## 2022-03-31 RX ADMIN — GABAPENTIN 100 MG: 100 CAPSULE ORAL at 09:17

## 2022-03-31 RX ADMIN — BUSPIRONE HYDROCHLORIDE 30 MG: 15 TABLET ORAL at 20:04

## 2022-03-31 RX ADMIN — GABAPENTIN 100 MG: 100 CAPSULE ORAL at 17:19

## 2022-03-31 RX ADMIN — CLOZAPINE 200 MG: 100 TABLET ORAL at 20:04

## 2022-03-31 RX ADMIN — SALMETEROL XINAFOATE 1 PUFF: 50 POWDER, METERED ORAL; RESPIRATORY (INHALATION) at 09:17

## 2022-03-31 RX ADMIN — MEGESTROL ACETATE 20 MG: 20 TABLET ORAL at 09:17

## 2022-03-31 RX ADMIN — ASPIRIN 81 MG: 81 TABLET, COATED ORAL at 09:17

## 2022-03-31 RX ADMIN — MEMANTINE 10 MG: 10 TABLET ORAL at 20:04

## 2022-03-31 RX ADMIN — LEVOTHYROXINE SODIUM 88 MCG: 0.09 TABLET ORAL at 09:17

## 2022-03-31 RX ADMIN — MEMANTINE 10 MG: 10 TABLET ORAL at 09:17

## 2022-03-31 RX ADMIN — SALMETEROL XINAFOATE 1 PUFF: 50 POWDER, METERED ORAL; RESPIRATORY (INHALATION) at 20:04

## 2022-03-31 RX ADMIN — Medication 5 MG: at 20:04

## 2022-03-31 RX ADMIN — GABAPENTIN 100 MG: 100 CAPSULE ORAL at 11:59

## 2022-03-31 RX ADMIN — HALOPERIDOL 1 MG: 1 TABLET ORAL at 20:04

## 2022-03-31 RX ADMIN — DONEPEZIL HYDROCHLORIDE 10 MG: 10 TABLET ORAL at 20:04

## 2022-03-31 ASSESSMENT — ACTIVITIES OF DAILY LIVING (ADL)
LAUNDRY: UNABLE TO COMPLETE
ORAL_HYGIENE: PROMPTS;INDEPENDENT
LAUNDRY: UNABLE TO COMPLETE
DRESS: SCRUBS (BEHAVIORAL HEALTH);INDEPENDENT
HYGIENE/GROOMING: PROMPTS;INDEPENDENT
HYGIENE/GROOMING: HANDWASHING;INDEPENDENT
ORAL_HYGIENE: INDEPENDENT
DRESS: SCRUBS (BEHAVIORAL HEALTH);INDEPENDENT

## 2022-03-31 NOTE — PLAN OF CARE
Goal Outcome Evaluation:    Plan of Care Reviewed With: patient     Patient has been isolative and withdrawn. He came out of his room for dinner only. His affect is flat. His mood is calm. He denies SI and SIB. He denies AH and VH. He denies depression and anxiety. He is alert to self only. He denies pain. He refused to attend groups. He is not social with peers and staff. He is medication compliant. He is disheveled with poor grooming. He refused to take a shower. Last shower is unknown. He voices no concerns.

## 2022-03-31 NOTE — PLAN OF CARE
"Goal Outcome Evaluation:    Pt has a flat affect, and is somewhat difficult to understand. He is in his rm, and states he keeps to self; reports he goes to \"some\" grps. His mood is \"depressed.\" Pt rates his depression 5/10, anxiety 4. He came to the DR for his bkfst. Pt cannot identify a goal for his day. He said, \"Get the f out of my rm,\" after taking his am meds.    8584) Pt was out to the DR briefly, for lunch-then back to rm. He is disheveled, w poor hygiene. Noon med brought to him in his rm.                    "

## 2022-03-31 NOTE — PLAN OF CARE
Assessment/Intervention/Current Symtoms and Care Coordination:  -Reviewed pt's record to get up to date with his current status and plan  -Rounded with team     -Patient was assessed by Cleve RODRIGUEZ at [facility]. Patient was accepted pending guardianship and CADI waiver approval.      Discharge Plan or Goal:  Discharge to [facility] homes when guardianship have been established.  LSS is working on streamlining guardianship     Barriers to Discharge:  History of aggression and elopement.  Lack of current guardian to make health decisions.   Several referrals made without acceptance      Referral Status:  None today

## 2022-03-31 NOTE — PLAN OF CARE
Goal Outcome Evaluation:       Pt appeared a sleep for 10.5 hours during the 15 minute safety checks. Pt on fall precautions, no behaviors observed. No concerns noted/expressed.

## 2022-04-01 LAB
BASOPHILS # BLD AUTO: 0.1 10E3/UL (ref 0–0.2)
BASOPHILS NFR BLD AUTO: 1 %
EOSINOPHIL # BLD AUTO: 1.5 10E3/UL (ref 0–0.7)
EOSINOPHIL NFR BLD AUTO: 23 %
ERYTHROCYTE [DISTWIDTH] IN BLOOD BY AUTOMATED COUNT: 14.1 % (ref 10–15)
HCT VFR BLD AUTO: 41.1 % (ref 40–53)
HGB BLD-MCNC: 14.4 G/DL (ref 13.3–17.7)
IMM GRANULOCYTES # BLD: 0 10E3/UL
IMM GRANULOCYTES NFR BLD: 0 %
LYMPHOCYTES # BLD AUTO: 1.9 10E3/UL (ref 0.8–5.3)
LYMPHOCYTES NFR BLD AUTO: 29 %
MCH RBC QN AUTO: 31.5 PG (ref 26.5–33)
MCHC RBC AUTO-ENTMCNC: 35 G/DL (ref 31.5–36.5)
MCV RBC AUTO: 90 FL (ref 78–100)
MONOCYTES # BLD AUTO: 0.6 10E3/UL (ref 0–1.3)
MONOCYTES NFR BLD AUTO: 9 %
NEUTROPHILS # BLD AUTO: 2.4 10E3/UL (ref 1.6–8.3)
NEUTROPHILS NFR BLD AUTO: 38 %
NRBC # BLD AUTO: 0 10E3/UL
NRBC BLD AUTO-RTO: 0 /100
PLATELET # BLD AUTO: 170 10E3/UL (ref 150–450)
RBC # BLD AUTO: 4.57 10E6/UL (ref 4.4–5.9)
WBC # BLD AUTO: 6.6 10E3/UL (ref 4–11)

## 2022-04-01 PROCEDURE — 250N000013 HC RX MED GY IP 250 OP 250 PS 637: Performed by: PSYCHIATRY & NEUROLOGY

## 2022-04-01 PROCEDURE — 85025 COMPLETE CBC W/AUTO DIFF WBC: CPT | Performed by: PSYCHIATRY & NEUROLOGY

## 2022-04-01 PROCEDURE — 124N000003 HC R&B MH SENIOR/ADOLESCENT

## 2022-04-01 PROCEDURE — 99231 SBSQ HOSP IP/OBS SF/LOW 25: CPT | Performed by: PSYCHIATRY & NEUROLOGY

## 2022-04-01 PROCEDURE — 36415 COLL VENOUS BLD VENIPUNCTURE: CPT | Performed by: PSYCHIATRY & NEUROLOGY

## 2022-04-01 RX ADMIN — BUSPIRONE HYDROCHLORIDE 30 MG: 15 TABLET ORAL at 08:40

## 2022-04-01 RX ADMIN — MIRTAZAPINE 15 MG: 15 TABLET, FILM COATED ORAL at 20:16

## 2022-04-01 RX ADMIN — GABAPENTIN 100 MG: 100 CAPSULE ORAL at 17:10

## 2022-04-01 RX ADMIN — DONEPEZIL HYDROCHLORIDE 10 MG: 10 TABLET ORAL at 20:16

## 2022-04-01 RX ADMIN — BUSPIRONE HYDROCHLORIDE 30 MG: 15 TABLET ORAL at 20:16

## 2022-04-01 RX ADMIN — ASPIRIN 81 MG: 81 TABLET, COATED ORAL at 08:40

## 2022-04-01 RX ADMIN — GABAPENTIN 100 MG: 100 CAPSULE ORAL at 12:08

## 2022-04-01 RX ADMIN — CLOZAPINE 200 MG: 100 TABLET ORAL at 20:16

## 2022-04-01 RX ADMIN — MEMANTINE 10 MG: 10 TABLET ORAL at 20:16

## 2022-04-01 RX ADMIN — HALOPERIDOL 1 MG: 1 TABLET ORAL at 20:16

## 2022-04-01 RX ADMIN — SALMETEROL XINAFOATE 1 PUFF: 50 POWDER, METERED ORAL; RESPIRATORY (INHALATION) at 08:40

## 2022-04-01 RX ADMIN — GABAPENTIN 100 MG: 100 CAPSULE ORAL at 08:39

## 2022-04-01 RX ADMIN — Medication 5 MG: at 20:16

## 2022-04-01 RX ADMIN — MEMANTINE 10 MG: 10 TABLET ORAL at 08:39

## 2022-04-01 RX ADMIN — SALMETEROL XINAFOATE 1 PUFF: 50 POWDER, METERED ORAL; RESPIRATORY (INHALATION) at 20:16

## 2022-04-01 RX ADMIN — LEVOTHYROXINE SODIUM 88 MCG: 0.09 TABLET ORAL at 08:39

## 2022-04-01 RX ADMIN — MEGESTROL ACETATE 20 MG: 20 TABLET ORAL at 08:40

## 2022-04-01 ASSESSMENT — ACTIVITIES OF DAILY LIVING (ADL)
ORAL_HYGIENE: INDEPENDENT;PROMPTS
LAUNDRY: UNABLE TO COMPLETE
HYGIENE/GROOMING: INDEPENDENT;WITH ASSISTANCE
DRESS: INDEPENDENT;PROMPTS;SCRUBS (BEHAVIORAL HEALTH)
ORAL_HYGIENE: PROMPTS;INDEPENDENT
DRESS: SCRUBS (BEHAVIORAL HEALTH)
HYGIENE/GROOMING: PROMPTS;INDEPENDENT

## 2022-04-01 NOTE — PROGRESS NOTES
"PSYCHIATRY  PROGRESS NOTE     DATE OF SERVICE   04/01/2022       CHIEF COMPLAINT   \"I am hungry\"       SUBJECTIVE   Nursing reports: Patient continues to present at his baseline and no new behaviors have been reported.    Patient has been discussed with the  during team meeting.  SW has continue to follow up on the guardianship process, we have not received any updates.      OBJECTIVE   Today patient was seen and evaluated at bedside by himself, this was a face-to-face evaluation.  Patient reported that he is feeling very hungry today and he was asking for extra snacks.  I informed the patient that I will let the staff know.  Patient then admitted that he has been hearing voices but refused to tell me what are the voices telling him.  Other than the voices the patient continues to denied all psychiatric symptoms and he is shayan for safety.  Patient has been informed on multiple occasions that he is going to a nursing home but he seems to have poor understanding of the information provided to him.  Patient continues to believe that he has multiple mansions in the [facility] in [place].     MEDICATIONS   Medications:  Scheduled Meds:    aspirin  81 mg Oral Daily     [Held by provider] atorvastatin  80 mg Oral At Bedtime     busPIRone HCl  30 mg Oral BID     cloZAPine  200 mg Oral At Bedtime     donepezil  10 mg Oral At Bedtime     gabapentin  100 mg Oral TID w/meals     haloperidol  1 mg Oral At Bedtime     levothyroxine  88 mcg Oral Daily     megestrol  20 mg Oral Daily     melatonin  5 mg Oral At Bedtime     memantine  10 mg Oral BID     mirtazapine  15 mg Oral At Bedtime     salmeterol  1 puff Inhalation BID     Continuous Infusions:  PRN Meds:.[Held by provider] acetaminophen, albuterol, alum & mag hydroxide-simethicone, benzocaine-menthol, hydrOXYzine, nicotine, polyethylene glycol, polyethylene glycol-propylene glycol PF, risperiDONE, senna-docusate, [Held by provider] " "traZODone    Medication adherence issues: MS Med Adherence Y/N: Yes, Hospitalization  Medication side effects: MEDICATION SIDE EFFECTS: no side effects reported  Benefit: Yes / No: Yes       ROS   A comprehensive review of systems was negative.       MENTAL STATUS EXAM   Vitals: /81 (BP Location: Left arm, Patient Position: Sitting, Cuff Size: Adult Regular)   Pulse 84   Temp 97.6  F (36.4  C) (Temporal)   Resp 16   Ht 1.778 m (5' 10\")   Wt 76.2 kg (168 lb 1.6 oz)   SpO2 96%   BMI 24.12 kg/m      Appearance:  No apparent distress  Mood: \"I am angry\"  Affect: Calm and pleasant  Suicidal Ideation: denies   Homicidal Ideation: denies   Thought process: concrete  Thought content: confused and responding to internal stimuli.  He admitted having auditory hallucinations and continues to be delusional  Fund of Knowledge: Below average  Attention/Concentration: Easily distracted  Language ability: Significantly impaired  Memory: Global memory impairment  Insight:  Poor.  Judgement: Poor  Orientation: Only to person  Psychomotor Behavior: slowed    Muscle Strength and Tone: MuscleStrength: Normal and Atrophy  Gait and Station: Not evaluated       LABS   personally reviewed.     No results found for: PHENYTOIN, PHENOBARB, VALPROATE, CBMZ       DIAGNOSIS   Principal Problem:    Major neurocognitive disorder, due to multiple etiologies, with behavioral disturbance, severe (H)    Active Problem List:  Patient Active Problem List   Diagnosis     TBI with aggressive behavior     HTN (hypertension)     COPD (chronic obstructive pulmonary disease) (H)     Dementia with behavioral disturbance (H)     Chronic schizophrenia (H)     Smoker     Agitation     Behavior disturbance     Psychosis (H)     Major neurocognitive disorder, due to multiple etiologies, with behavioral disturbance, severe (H)     Paranoid schizophrenia, chronic condition with acute exacerbation (H)          PLAN   1. Ongoing education given regarding " diagnostic and treatment options with risks, benefits and alternatives and adequate verbalization of understanding.  2.  Medications       BuSpar 30 mg 2 times daily       Clozaril 200 mg at bedtime       Aricept 10 mg at bedtime       Gabapentin 100 mg 3 times a day       Haldol 1 mg at bedtime       Namenda 10 mg 2 times a day       Remeron 15 mg at bedtime  3.  Medical team following the patient as needed   4.   coordinating a safe discharge plan with the patient.    Risk Assessment: [facility] RISK ASSESSMENT: Patient able to contract for safety    Coordination of Care:   Treatment Plan reviewed and physician signed, Care discussed with Care/Treatment Team Members, Chart reviewed and Patient seen      Re-Certification I certify that the inpatient psychiatric facility services furnished since the previous certification were, and continue to be, medically necessary for, either, treatment which could reasonably be expected to improve the patient s condition or diagnostic study and that the hospital records indicate that the services furnished were, either, intensive treatment services, admission and related services necessary for diagnostic study, or equivalent services.     I certify that the patient continues to need, on a daily basis, active treatment furnished directly by or requiring the supervision of inpatient psychiatric facility personnel.   I estimate 14 days of hospitalization is necessary for proper treatment of the patient. My plans for post-hospital care for this patient are  TBD     Ginger Dubon MD    -     04/01/2022  -     12:14 PM    Total time 15 minutes with > 50%spent on coordination of cares and psycho-education.    This note was created with help of Dragon dictation system. Grammatical / typing errors are not intentional.    Ginger Dubon MD

## 2022-04-01 NOTE — PLAN OF CARE
"Problem: Depression  Goal: Improved Mood  Outcome: Ongoing, Progressing   Goal Outcome Evaluation:  Plan of Care Reviewed With: patient     VSS. Patient rested in his room for most of this shift. Pt is not attending groups and is isolative/withdrawn. Patient rates depression and anxiety at 5/10 and 6/10 respectively. Patient denied SI, SIB, HI, auditory, and visual hallucinations. Patient describes mood as \"depressed and anxious\" and affect is flat. Patient is cooperative with cares appearing Anxious/Nervous. Patient is med-compliant, and no side effects reported. Pt is eating 100% of his meals and reports \"feeling restlessness and tired\". Patient evaluation continues.     VS reviewed: /81 (BP Location: Left arm, Patient Position: Sitting, Cuff Size: Adult Regular)   Pulse 84   Temp 97.6  F (36.4  C) (Temporal)   Resp 16   Ht 1.778 m (5' 10\")   Wt 76.2 kg (168 lb 1.6 oz)   SpO2 96%   BMI 24.12 kg/m   .       Length of stay: 65                                                                              "

## 2022-04-01 NOTE — PLAN OF CARE
Goal Outcome Evaluation:    Plan of Care Reviewed With: patient     Patient remains isolative and withdrawn. He came out of his dinner for dinner only. His affect is flat. His mood is calm. He denies SI and SIB. He denies AH and VH. He denies depression and anxiety. He refused to attend groups and shower. He is not social with peers and staff. He is medication compliant. He denies pain. [facility] has accepted patient pending guardianship and CADI waiver approval.

## 2022-04-01 NOTE — PROGRESS NOTES
Pt appeared to be sleeping a total of about 11 hours. No concerns reported/noted this shift.    You may use Advil (ibuprofen) 600mg every 6 hours OR Aleve (naproxen) 250-500mg every 12 hours as needed for pain and inflammation  You may also take Tylenol 500mg every 4-6 hours as needed  Check with your primary care physician to see if these medications are safe to take and to make sure they do not interfere with your other medications and medical issues  Knee Sprain, Ambulatory Care   GENERAL INFORMATION:   A knee sprain  is caused by a stretched or torn ligament in your knee  Ligaments are tough tissues that connect bones  A knee sprain usually occurs during exercise or sports  Common symptoms include the following:   · Bruising or changes in skin color    · Inability to put weight on your leg    · Pain, tenderness, and swelling    · Stiffness and decreased knee and leg movement  Seek immediate care for the following symptoms:   · Cold or numbness below the injury, such as in your toes    · Decreased or loss of movement in your injured leg    · Increased pain, even after taking pain medicine  Treatment for a knee sprain  may include a support device, such as a brace  A brace helps limit movement and protect your knee  Treatment may also include pain medicine, physical therapy, or surgery if the ligament does not heal   Care for a knee sprain:   · Rest  your knee and limit movement for as long as directed  Use crutches as directed to take weight off your knee while it heals  · Apply ice  on your injured knee for 15 to 20 minutes every hour or as directed  Use an ice pack, or put crushed ice in a plastic bag  Cover it with a towel  Ice helps prevent tissue damage and decreases swelling and pain  · Compress  your injured knee as directed with an elastic bandage or brace to support your foot  Wear your brace for as many days as directed  · Elevate  your knee by lying down and resting it on pillows that are higher than your heart   This should be done as often as you can to help reduce swelling  · Exercise  your knee and leg as directed to improve your strength and help decrease stiffness  The exercises and physical therapy can help restore strength and increase the range of motion in your leg  Ask your healthcare provider when you can return to your normal activities or play sports  Prevent another knee sprain:   · Warm up and stretch before you exercise  · Do not exercise when you are tired or in pain  · Wear shoes that fit well and run on flat surfaces to prevent falls  · Wear equipment to protect yourself when you play sports  Follow up with your healthcare provider as directed:  Write down your questions so you remember to ask them during your visits  CARE AGREEMENT:   You have the right to help plan your care  Learn about your health condition and how it may be treated  Discuss treatment options with your caregivers to decide what care you want to receive  You always have the right to refuse treatment  The above information is an  only  It is not intended as medical advice for individual conditions or treatments  Talk to your doctor, nurse or pharmacist before following any medical regimen to see if it is safe and effective for you  © 2014 [address] is for End User's use only and may not be sold, redistributed or otherwise used for commercial purposes  All illustrations and images included in CareNotes® are the copyrighted property of [employer]  or Mian Castillo

## 2022-04-02 PROCEDURE — 124N000003 HC R&B MH SENIOR/ADOLESCENT

## 2022-04-02 PROCEDURE — 250N000013 HC RX MED GY IP 250 OP 250 PS 637: Performed by: PSYCHIATRY & NEUROLOGY

## 2022-04-02 RX ADMIN — MEMANTINE 10 MG: 10 TABLET ORAL at 08:10

## 2022-04-02 RX ADMIN — GABAPENTIN 100 MG: 100 CAPSULE ORAL at 17:23

## 2022-04-02 RX ADMIN — Medication 5 MG: at 20:34

## 2022-04-02 RX ADMIN — MEGESTROL ACETATE 20 MG: 20 TABLET ORAL at 08:10

## 2022-04-02 RX ADMIN — GABAPENTIN 100 MG: 100 CAPSULE ORAL at 08:10

## 2022-04-02 RX ADMIN — BUSPIRONE HYDROCHLORIDE 30 MG: 15 TABLET ORAL at 20:34

## 2022-04-02 RX ADMIN — SALMETEROL XINAFOATE 1 PUFF: 50 POWDER, METERED ORAL; RESPIRATORY (INHALATION) at 08:10

## 2022-04-02 RX ADMIN — DONEPEZIL HYDROCHLORIDE 10 MG: 10 TABLET ORAL at 20:34

## 2022-04-02 RX ADMIN — BUSPIRONE HYDROCHLORIDE 30 MG: 15 TABLET ORAL at 08:10

## 2022-04-02 RX ADMIN — ASPIRIN 81 MG: 81 TABLET, COATED ORAL at 08:10

## 2022-04-02 RX ADMIN — HALOPERIDOL 1 MG: 1 TABLET ORAL at 20:34

## 2022-04-02 RX ADMIN — CLOZAPINE 200 MG: 100 TABLET ORAL at 20:34

## 2022-04-02 RX ADMIN — LEVOTHYROXINE SODIUM 88 MCG: 0.09 TABLET ORAL at 08:10

## 2022-04-02 RX ADMIN — GABAPENTIN 100 MG: 100 CAPSULE ORAL at 12:29

## 2022-04-02 RX ADMIN — SALMETEROL XINAFOATE 1 PUFF: 50 POWDER, METERED ORAL; RESPIRATORY (INHALATION) at 20:34

## 2022-04-02 RX ADMIN — MIRTAZAPINE 15 MG: 15 TABLET, FILM COATED ORAL at 20:34

## 2022-04-02 RX ADMIN — MEMANTINE 10 MG: 10 TABLET ORAL at 20:34

## 2022-04-02 ASSESSMENT — ACTIVITIES OF DAILY LIVING (ADL)
LAUNDRY: UNABLE TO COMPLETE
ORAL_HYGIENE: PROMPTS;INDEPENDENT
DRESS: SCRUBS (BEHAVIORAL HEALTH)
HYGIENE/GROOMING: PROMPTS;WITH ASSISTANCE
HYGIENE/GROOMING: PROMPTS;WITH ASSISTANCE
DRESS: SCRUBS (BEHAVIORAL HEALTH);PROMPTS;INDEPENDENT
ORAL_HYGIENE: PROMPTS;INDEPENDENT

## 2022-04-02 NOTE — PLAN OF CARE
Problem: Sleep Disturbance (Psychotic Signs/Symptoms)  Goal: Improved Sleep (Psychotic Signs/Symptoms)  Outcome: Ongoing, Progressing   Goal Outcome Evaluation:     Patient slept a total of 9 hours without any interruptions. No behavioral issues noted the whole shift.

## 2022-04-02 NOTE — PLAN OF CARE
Problem: Behavioral Health Plan of Care  Goal: Plan of Care Review  Outcome: Ongoing, Not Progressing  Flowsheets (Taken 4/2/2022 0831)  Plan of Care Reviewed With: patient  Patient Agreement with Plan of Care: agrees    Pt presents with blunted, flat affect and calm mood. Denies SI/SIB. Reports auditory hallucinations, states that he is hearing his family members. Speech continues to be garbled, incoherent at times, and soft/quiet. Pt is alert to self only, continues to believe he is at [facility], cannot recall correct date, states that it is June 19th 2001, and cannot report reason for being in the hospital. Denies depression and anxiety when asked this morning. Attempted to reorient pt to place, time, and situation but pt did not retain this information. Pt was isolated to his room most of the shift, only coming out for meals. Appetite and fluid intake adequate. Gait steady and balanced. Denies pain. VSS. Medication compliant. Pt did shower this morning with male assistance. No other concerns or complaints noted.

## 2022-04-02 NOTE — PLAN OF CARE
Goal Outcome Evaluation:    Plan of Care Reviewed With: patient        Pt continues to isolate to room except for meals. Ate >75% of dinner. Poverty of thought and speech. One-word responses. Disoriented to date. Affect flat. Poor eye contact. Hygiene poor. Gait steady. Cooperative. Med-compliant. Continue with current treatment plan and recommendations. Continue to monitor and reassess symptoms. Monitor response to medications. Monitor progress towards treatment goals. Encourage groups and participation.

## 2022-04-03 PROCEDURE — 250N000013 HC RX MED GY IP 250 OP 250 PS 637: Performed by: PSYCHIATRY & NEUROLOGY

## 2022-04-03 PROCEDURE — 124N000003 HC R&B MH SENIOR/ADOLESCENT

## 2022-04-03 RX ADMIN — LEVOTHYROXINE SODIUM 88 MCG: 0.09 TABLET ORAL at 09:06

## 2022-04-03 RX ADMIN — SALMETEROL XINAFOATE 1 PUFF: 50 POWDER, METERED ORAL; RESPIRATORY (INHALATION) at 20:23

## 2022-04-03 RX ADMIN — GABAPENTIN 100 MG: 100 CAPSULE ORAL at 17:50

## 2022-04-03 RX ADMIN — BUSPIRONE HYDROCHLORIDE 30 MG: 15 TABLET ORAL at 09:06

## 2022-04-03 RX ADMIN — SALMETEROL XINAFOATE 1 PUFF: 50 POWDER, METERED ORAL; RESPIRATORY (INHALATION) at 09:06

## 2022-04-03 RX ADMIN — MEGESTROL ACETATE 20 MG: 20 TABLET ORAL at 09:05

## 2022-04-03 RX ADMIN — GABAPENTIN 100 MG: 100 CAPSULE ORAL at 12:25

## 2022-04-03 RX ADMIN — MEMANTINE 10 MG: 10 TABLET ORAL at 20:23

## 2022-04-03 RX ADMIN — Medication 5 MG: at 20:23

## 2022-04-03 RX ADMIN — CLOZAPINE 200 MG: 100 TABLET ORAL at 20:23

## 2022-04-03 RX ADMIN — GABAPENTIN 100 MG: 100 CAPSULE ORAL at 09:07

## 2022-04-03 RX ADMIN — MIRTAZAPINE 15 MG: 15 TABLET, FILM COATED ORAL at 20:23

## 2022-04-03 RX ADMIN — MEMANTINE 10 MG: 10 TABLET ORAL at 09:05

## 2022-04-03 RX ADMIN — BUSPIRONE HYDROCHLORIDE 30 MG: 15 TABLET ORAL at 20:23

## 2022-04-03 RX ADMIN — ASPIRIN 81 MG: 81 TABLET, COATED ORAL at 09:06

## 2022-04-03 RX ADMIN — DONEPEZIL HYDROCHLORIDE 10 MG: 10 TABLET ORAL at 20:23

## 2022-04-03 RX ADMIN — HALOPERIDOL 1 MG: 1 TABLET ORAL at 20:23

## 2022-04-03 NOTE — PLAN OF CARE
Goal Outcome Evaluation:    Plan of Care Reviewed With: patient        Pt continues to isolate to room except for meals. Ate >75% of dinner. Poverty of thought and speech. One-word responses. Disoriented to date, place. Affect flat. Poor eye contact. Hygiene poor. Gait steady. Cooperative. Med-compliant. Continue with current treatment plan and recommendations. Continue to monitor and reassess symptoms. Monitor response to medications. Monitor progress towards treatment goals. Encourage groups and participation.

## 2022-04-03 NOTE — PLAN OF CARE
"  Problem: Behavior Regulation Impairment (Psychotic Signs/Symptoms)  Goal: Improved Behavioral Control (Psychotic Signs/Symptoms)  Outcome: Ongoing, Progressing   Goal Outcome Evaluation:    Plan of Care Reviewed With: patient             Nursing Assessment    Psychosis (H) [F29]    Admit Date: 1/26/2022    Length of Stay: 67    Patient evaluation continues. Assessed mood,anxiety,thoughts and behavior. Patient is  progressing towards goals. Patient is encouraged to participate in groups and assisted to develop healthy coping skills.  Patient denies  auditory or visual hallucinations. /76 (BP Location: Right arm, Patient Position: Supine, Cuff Size: Adult Regular)   Pulse 85   Temp 98.4  F (36.9  C) (Oral)   Resp 16   Ht 1.778 m (5' 10\")   Wt 76.1 kg (167 lb 12.8 oz)   SpO2 97%   BMI 24.08 kg/m      Mood: ok    Patient reports depression none and reports anxiety none    Affect:flat blunted    Sleep: good    Appetite: good    SI: denies    HI: denies    SIB: denies      Medication Compliance: yes    Group participation: no    ADL's: prompted, refuses at times    Fall risk interventions: proper foot wear, orthostatic B/p    Rinku Score Interventions:none    Discharge planning in process    Refer to daily team meeting notes for individualized plan of care. Nursing will continue to assess.    *Scale is 1-10 and 10 is the worst.           "

## 2022-04-03 NOTE — PLAN OF CARE
Problem: Sleep Disturbance (Psychotic Signs/Symptoms)  Goal: Improved Sleep (Psychotic Signs/Symptoms)  Outcome: Ongoing, Progressing   Goal Outcome Evaluation:        Slept well tonight for 9 hours  Independent with repositioning in bed  No behavioral issues encountered tonight

## 2022-04-03 NOTE — PLAN OF CARE
Goal Outcome Evaluation:    Plan of Care Reviewed With: patient        Pt continues to isolate to room except for meals. Ate >75% of dinner. Poverty of thought and speech. One-word responses. Oriented to self only. Affect flat. Poor eye contact. Hygiene poor. Gait steady. Cooperative. Med-compliant. Continue with current treatment plan and recommendations. Continue to monitor and reassess symptoms. Monitor response to medications. Monitor progress towards treatment goals. Encourage groups and participation

## 2022-04-04 PROCEDURE — 250N000013 HC RX MED GY IP 250 OP 250 PS 637: Performed by: PSYCHIATRY & NEUROLOGY

## 2022-04-04 PROCEDURE — 99231 SBSQ HOSP IP/OBS SF/LOW 25: CPT | Performed by: PSYCHIATRY & NEUROLOGY

## 2022-04-04 PROCEDURE — 124N000003 HC R&B MH SENIOR/ADOLESCENT

## 2022-04-04 RX ADMIN — MEMANTINE 10 MG: 10 TABLET ORAL at 20:24

## 2022-04-04 RX ADMIN — MEMANTINE 10 MG: 10 TABLET ORAL at 08:13

## 2022-04-04 RX ADMIN — SALMETEROL XINAFOATE 1 PUFF: 50 POWDER, METERED ORAL; RESPIRATORY (INHALATION) at 20:24

## 2022-04-04 RX ADMIN — ASPIRIN 81 MG: 81 TABLET, COATED ORAL at 08:13

## 2022-04-04 RX ADMIN — GABAPENTIN 100 MG: 100 CAPSULE ORAL at 12:23

## 2022-04-04 RX ADMIN — LEVOTHYROXINE SODIUM 88 MCG: 0.09 TABLET ORAL at 08:13

## 2022-04-04 RX ADMIN — BUSPIRONE HYDROCHLORIDE 30 MG: 15 TABLET ORAL at 20:24

## 2022-04-04 RX ADMIN — GABAPENTIN 100 MG: 100 CAPSULE ORAL at 17:21

## 2022-04-04 RX ADMIN — HALOPERIDOL 1 MG: 1 TABLET ORAL at 20:24

## 2022-04-04 RX ADMIN — BUSPIRONE HYDROCHLORIDE 30 MG: 15 TABLET ORAL at 08:13

## 2022-04-04 RX ADMIN — MIRTAZAPINE 15 MG: 15 TABLET, FILM COATED ORAL at 20:24

## 2022-04-04 RX ADMIN — SALMETEROL XINAFOATE 1 PUFF: 50 POWDER, METERED ORAL; RESPIRATORY (INHALATION) at 08:13

## 2022-04-04 RX ADMIN — Medication 5 MG: at 20:24

## 2022-04-04 RX ADMIN — CLOZAPINE 200 MG: 100 TABLET ORAL at 20:24

## 2022-04-04 RX ADMIN — GABAPENTIN 100 MG: 100 CAPSULE ORAL at 08:13

## 2022-04-04 RX ADMIN — MEGESTROL ACETATE 20 MG: 20 TABLET ORAL at 08:13

## 2022-04-04 ASSESSMENT — ACTIVITIES OF DAILY LIVING (ADL)
ORAL_HYGIENE: INDEPENDENT;PROMPTS
ORAL_HYGIENE: INDEPENDENT;PROMPTS
DRESS: SCRUBS (BEHAVIORAL HEALTH)
HYGIENE/GROOMING: INDEPENDENT;PROMPTS
DRESS: INDEPENDENT;PROMPTS
HYGIENE/GROOMING: INDEPENDENT;PROMPTS
LAUNDRY: UNABLE TO COMPLETE

## 2022-04-04 NOTE — PLAN OF CARE
Assessment/Intervention/Current Symtoms and Care Coordination:  -Reviewed pt's record to get up to date with his current status and plan  -Rounded with team     -Patient was assessed by Cleve RODRIGUEZ at [facility]. Patient was accepted pending guardianship and CADI waiver approval.      Discharge Plan or Goal:  Discharge to [facility] when guardianship have been established.  LSS is working on streamlining guardianship     Barriers to Discharge:  History of aggression and elopement.  Lack of current guardian to make health decisions.   Several referrals made without acceptance      Referral Status:  None today

## 2022-04-04 NOTE — PROGRESS NOTES
"PSYCHIATRY  PROGRESS NOTE     DATE OF SERVICE   04/04/2022       CHIEF COMPLAINT   \"When am I getting the fuck out of here?\"       SUBJECTIVE   Nursing reports: Patient continues to be isolative to his room.  On Saturday he did agreed for the staff to help him take a shower.  He has been compliant with the medications.    Patient has been discussed with the  during team meeting.  There has been no updates on the patient's discharge     OBJECTIVE   Today patient was seen and evaluated at bedside by himself, this was a face-to-face evaluation.  Patient was asking when he will be leaving the hospital.  I informed the patient that I do not have any updates for him but the  is going to communicate with him.  Patient then tells me that he has a lot of money and a mansion in [place] and I need to send him there right now.  I asked the patient if he knows the address of this home and he denied.  I discussed with the patient that we are working with the [facility] to find him a safe place.  Patient was dismissive but he said that he was \"all right\".       MEDICATIONS   Medications:  Scheduled Meds:    aspirin  81 mg Oral Daily     [Held by provider] atorvastatin  80 mg Oral At Bedtime     busPIRone HCl  30 mg Oral BID     cloZAPine  200 mg Oral At Bedtime     donepezil  10 mg Oral At Bedtime     gabapentin  100 mg Oral TID w/meals     haloperidol  1 mg Oral At Bedtime     levothyroxine  88 mcg Oral Daily     megestrol  20 mg Oral Daily     melatonin  5 mg Oral At Bedtime     memantine  10 mg Oral BID     mirtazapine  15 mg Oral At Bedtime     salmeterol  1 puff Inhalation BID     Continuous Infusions:  PRN Meds:.[Held by provider] acetaminophen, albuterol, alum & mag hydroxide-simethicone, benzocaine-menthol, hydrOXYzine, nicotine, polyethylene glycol, polyethylene glycol-propylene glycol PF, risperiDONE, senna-docusate, [Held by provider] traZODone    Medication adherence issues: MS Med Adherence " "Y/N: Yes, Hospitalization  Medication side effects: MEDICATION SIDE EFFECTS: no side effects reported  Benefit: Yes / No: Yes       ROS   A comprehensive review of systems was negative.       MENTAL STATUS EXAM   Vitals: /84   Pulse 113   Temp 98.1  F (36.7  C) (Oral)   Resp 16   Ht 1.778 m (5' 10\")   Wt 76.1 kg (167 lb 12.8 oz)   SpO2 99%   BMI 24.08 kg/m      Appearance:  No apparent distress  Mood: \"I am all right\"  Affect: Calm and pleasant  Suicidal Ideation: denies   Homicidal Ideation: denies   Thought process: concrete  Thought content: confused and responding to internal stimuli.  He admitted having auditory hallucinations and continues to be delusional  Fund of Knowledge: Below average  Attention/Concentration: Easily distracted  Language ability: Significantly impaired  Memory: Global memory impairment  Insight:  Poor.  Judgement: Poor  Orientation: Only to person  Psychomotor Behavior: slowed    Muscle Strength and Tone: MuscleStrength: Normal and Atrophy  Gait and Station: Not evaluated       LABS   personally reviewed.     No results found for: PHENYTOIN, PHENOBARB, VALPROATE, CBMZ       DIAGNOSIS   Principal Problem:    Major neurocognitive disorder, due to multiple etiologies, with behavioral disturbance, severe (H)    Active Problem List:  Patient Active Problem List   Diagnosis     TBI with aggressive behavior     HTN (hypertension)     COPD (chronic obstructive pulmonary disease) (H)     Dementia with behavioral disturbance (H)     Chronic schizophrenia (H)     Smoker     Agitation     Behavior disturbance     Psychosis (H)     Major neurocognitive disorder, due to multiple etiologies, with behavioral disturbance, severe (H)     Paranoid schizophrenia, chronic condition with acute exacerbation (H)          PLAN   1. Ongoing education given regarding diagnostic and treatment options with risks, benefits and alternatives and adequate verbalization of understanding.  2.  Medications      "  BuSpar 30 mg 2 times daily       Clozaril 200 mg at bedtime       Aricept 10 mg at bedtime       Gabapentin 100 mg 3 times a day       Haldol 1 mg at bedtime       Namenda 10 mg 2 times a day       Remeron 15 mg at bedtime  3.  Medical team following the patient as needed   4.   coordinating a safe discharge plan with the patient.    Risk Assessment: [facility] RISK ASSESSMENT: Patient able to contract for safety    Coordination of Care:   Treatment Plan reviewed and physician signed, Care discussed with Care/Treatment Team Members, Chart reviewed and Patient seen      Re-Certification I certify that the inpatient psychiatric facility services furnished since the previous certification were, and continue to be, medically necessary for, either, treatment which could reasonably be expected to improve the patient s condition or diagnostic study and that the hospital records indicate that the services furnished were, either, intensive treatment services, admission and related services necessary for diagnostic study, or equivalent services.     I certify that the patient continues to need, on a daily basis, active treatment furnished directly by or requiring the supervision of inpatient psychiatric facility personnel.   I estimate 14 days of hospitalization is necessary for proper treatment of the patient. My plans for post-hospital care for this patient are  TBD     Ginger Dubon MD    -     04/04/2022  -     12:11 PM    Total time 15 minutes with > 50%spent on coordination of cares and psycho-education.    This note was created with help of Dragon dictation system. Grammatical / typing errors are not intentional.    Ginger Dubon MD

## 2022-04-04 NOTE — PLAN OF CARE
"  Problem: Cognitive Impairment (Psychotic Signs/Symptoms)  Goal: Optimal Cognitive Function (Psychotic Signs/Symptoms)  Outcome: Ongoing, Progressing  Flowsheets (Taken 4/4/2022 1518)  Mutually Determined Action Steps (Optimal Cognitive Function): follows step-by-step instructions  Intervention: Support and Promote Cognitive Ability  Recent Flowsheet Documentation  Taken 4/4/2022 1301 by Wen Broderick RN  Trust Relationship/Rapport:Nursing Assessment    Psychosis (H) [F29]    Admit Date: 1/26/2022    Length of Stay: 68    Patient evaluation continues. Assessed mood,anxiety,thoughts and behavior. Patient is progressing towards goals. Patient is encouraged to participate in groups and assisted to develop healthy coping skills.  Patient denies auditory or visual hallucinations. /81 (Patient Position: Supine)   Pulse 99   Temp 98.2  F (36.8  C) (Oral)   Resp 16   Ht 1.778 m (5' 10\")   Wt 76.1 kg (167 lb 12.8 oz)   SpO2 98%   BMI 24.08 kg/m      Mood: good    Patient reports depression none and reports anxiety none    Affect:flat blunted    Sleep: good, naps during the day    Appetite: good    SI: denies    HI: denies    SIB: denies      Medication Compliance yes    Group participation:no    ADL's: needs prompts and encouragement    Fall risk interventions: proper foot wear,  orthostatic B/P    Rinku Score Interventions:none    Discharge planning in process    Refer to daily team meeting notes for individualized plan of care. Nursing will continue to assess.    *Scale is 1-10 and 10 is the worst.          care explained   choices provided   emotional support provided   questions encouraged   thoughts/feelings acknowledged   Goal Outcome Evaluation:    Plan of Care Reviewed With: patient                 "

## 2022-04-04 NOTE — PLAN OF CARE
Assessment/Intervention/Current Symtoms and Care Coordination:  -Reviewed pt's record to get up to date with his current status and plan  -Rounded with team     -Patient was assessed by Cleve RODRIGUEZ at [facility]. Patient was accepted pending guardianship and CADI waiver approval.      Discharge Plan or Goal:  Discharge to [facility] or [facility] when guardianship have been established.  LSS is working on streamlining guardianship     Barriers to Discharge:  History of aggression and elopement.  Lack of current guardian to make health decisions.   Several referrals made without acceptance      Referral Status:  None today

## 2022-04-04 NOTE — PLAN OF CARE
Assessment/Intervention/Current Symtoms and Care Coordination:  -Reviewed pt's record to get up to date with his current status and plan  -Rounded with team     -Patient was assessed by Cleve RODRIGUEZ at [facility]. Patient was accepted pending guardianship and CADI waiver approval.      Discharge Plan or Goal:  [facility] when guardianship have been established.  LSS is working on streamlining guardianship     Barriers to Discharge:  History of aggression and elopement.  Lack of current guardian to make health decisions.   Several referrals made without acceptance      Referral Status:  None today

## 2022-04-04 NOTE — PLAN OF CARE
"Goal Outcome Evaluation:    Plan of Care Reviewed With: patient     Overall Patient Progress: no change         John reports mood is \"tired,\" and that \"It's the same fucken day everyday.\" Affect is flat, blunted. He ate dinner and snack in the lounge area then returned to his room. He is alert to himself only. Reports date was June 18 2000 and 3000. He asks about his location and was told he is at [facility]. He was isolative to room, napping or either laying in bed. Did not want to attend groups. VSS.    Intake   04/04/22 1750 04/04/22 2200   Intake (mL)   Intake (%) 100% 100%  (snack: banana + sunbutter)   Appetite  --  Good       Plan  Continue with plan of care.    Pt did not receive donezepil dose due to pharmacy delay and pt fell asleep.     Considering patient's length of stay (in [facility]) since September 9, 2020, discuss opportunity to go off unit to [place].    "

## 2022-04-04 NOTE — PLAN OF CARE
Problem: Behavior Regulation Impairment (Psychotic Signs/Symptoms)  Goal: Improved Behavioral Control (Psychotic Signs/Symptoms)  Outcome: Ongoing, Progressing   Goal Outcome Evaluation:             Slept uninterrupted for 8.5 hours  No concerns raised this shift

## 2022-04-05 PROCEDURE — 124N000003 HC R&B MH SENIOR/ADOLESCENT

## 2022-04-05 PROCEDURE — 250N000013 HC RX MED GY IP 250 OP 250 PS 637: Performed by: PSYCHIATRY & NEUROLOGY

## 2022-04-05 RX ADMIN — GABAPENTIN 100 MG: 100 CAPSULE ORAL at 17:21

## 2022-04-05 RX ADMIN — HALOPERIDOL 1 MG: 1 TABLET ORAL at 20:26

## 2022-04-05 RX ADMIN — MIRTAZAPINE 15 MG: 15 TABLET, FILM COATED ORAL at 20:26

## 2022-04-05 RX ADMIN — Medication 5 MG: at 20:26

## 2022-04-05 RX ADMIN — ASPIRIN 81 MG: 81 TABLET, COATED ORAL at 08:07

## 2022-04-05 RX ADMIN — BUSPIRONE HYDROCHLORIDE 30 MG: 15 TABLET ORAL at 20:26

## 2022-04-05 RX ADMIN — MEMANTINE 10 MG: 10 TABLET ORAL at 20:26

## 2022-04-05 RX ADMIN — GABAPENTIN 100 MG: 100 CAPSULE ORAL at 08:07

## 2022-04-05 RX ADMIN — LEVOTHYROXINE SODIUM 88 MCG: 0.09 TABLET ORAL at 08:07

## 2022-04-05 RX ADMIN — CLOZAPINE 200 MG: 100 TABLET ORAL at 20:26

## 2022-04-05 RX ADMIN — BUSPIRONE HYDROCHLORIDE 30 MG: 15 TABLET ORAL at 08:07

## 2022-04-05 RX ADMIN — GABAPENTIN 100 MG: 100 CAPSULE ORAL at 12:38

## 2022-04-05 RX ADMIN — MEMANTINE 10 MG: 10 TABLET ORAL at 08:07

## 2022-04-05 RX ADMIN — DONEPEZIL HYDROCHLORIDE 10 MG: 10 TABLET ORAL at 20:26

## 2022-04-05 RX ADMIN — MEGESTROL ACETATE 20 MG: 20 TABLET ORAL at 08:06

## 2022-04-05 RX ADMIN — SALMETEROL XINAFOATE 1 PUFF: 50 POWDER, METERED ORAL; RESPIRATORY (INHALATION) at 08:07

## 2022-04-05 RX ADMIN — SALMETEROL XINAFOATE 1 PUFF: 50 POWDER, METERED ORAL; RESPIRATORY (INHALATION) at 20:26

## 2022-04-05 ASSESSMENT — ACTIVITIES OF DAILY LIVING (ADL)
DRESS: INDEPENDENT;PROMPTS
HYGIENE/GROOMING: INDEPENDENT;PROMPTS
HYGIENE/GROOMING: INDEPENDENT;PROMPTS
ORAL_HYGIENE: INDEPENDENT;PROMPTS
ORAL_HYGIENE: INDEPENDENT;PROMPTS
DRESS: INDEPENDENT
LAUNDRY: UNABLE TO COMPLETE

## 2022-04-05 NOTE — PLAN OF CARE
"  Problem: Cognitive Impairment (Psychotic Signs/Symptoms)  Goal: Optimal Cognitive Function (Psychotic Signs/Symptoms)  Outcome: Ongoing, Not Progressing  Intervention: Support and Promote Cognitive Ability  Recent Flowsheet Documentation  Taken 4/5/2022 1200 by Juarez Murphy RN  Trust Relationship/Rapport: care explained     Problem: Decreased Participation and Engagement (Psychotic Signs/Symptoms)  Goal: Increased Participation and Engagement (Psychotic Signs/Symptoms)  Outcome: Ongoing, Progressing   Goal Outcome Evaluation:    Plan of Care Reviewed With: patient     Patient calm and cooperative, medication compliant. Present for meals, otherwise isolates to room. Reported auditory hallucinations stating \"I hear my mom\", patient could not elaborate on what the voice was saying. Denies all other mental health concerns. Declined to perform ADLs    "

## 2022-04-05 NOTE — PROGRESS NOTES
Patient has been seen today with the use of telehealth.  The patient remains at his baseline and there has been no new medication changes.    Ginger Dubon MD

## 2022-04-05 NOTE — PLAN OF CARE
Problem: Sleep Disturbance (Psychotic Signs/Symptoms)  Goal: Improved Sleep (Psychotic Signs/Symptoms)  Outcome: Ongoing, Progressing   Goal Outcome Evaluation:                  Slept well for 9 hours, was observed awake at 0400 but fell right back to sleep.  No behavioral issues encountered this shift

## 2022-04-06 PROCEDURE — 250N000013 HC RX MED GY IP 250 OP 250 PS 637: Performed by: PSYCHIATRY & NEUROLOGY

## 2022-04-06 PROCEDURE — 99231 SBSQ HOSP IP/OBS SF/LOW 25: CPT | Performed by: PSYCHIATRY & NEUROLOGY

## 2022-04-06 PROCEDURE — 124N000003 HC R&B MH SENIOR/ADOLESCENT

## 2022-04-06 RX ADMIN — MEMANTINE 10 MG: 10 TABLET ORAL at 20:06

## 2022-04-06 RX ADMIN — MEMANTINE 10 MG: 10 TABLET ORAL at 09:00

## 2022-04-06 RX ADMIN — SALMETEROL XINAFOATE 1 PUFF: 50 POWDER, METERED ORAL; RESPIRATORY (INHALATION) at 09:00

## 2022-04-06 RX ADMIN — BUSPIRONE HYDROCHLORIDE 30 MG: 15 TABLET ORAL at 09:00

## 2022-04-06 RX ADMIN — CLOZAPINE 200 MG: 100 TABLET ORAL at 20:05

## 2022-04-06 RX ADMIN — HALOPERIDOL 1 MG: 1 TABLET ORAL at 20:06

## 2022-04-06 RX ADMIN — ASPIRIN 81 MG: 81 TABLET, COATED ORAL at 09:00

## 2022-04-06 RX ADMIN — LEVOTHYROXINE SODIUM 88 MCG: 0.09 TABLET ORAL at 09:00

## 2022-04-06 RX ADMIN — Medication 5 MG: at 20:06

## 2022-04-06 RX ADMIN — BUSPIRONE HYDROCHLORIDE 30 MG: 15 TABLET ORAL at 20:05

## 2022-04-06 RX ADMIN — DONEPEZIL HYDROCHLORIDE 10 MG: 10 TABLET ORAL at 20:06

## 2022-04-06 RX ADMIN — GABAPENTIN 100 MG: 100 CAPSULE ORAL at 12:49

## 2022-04-06 RX ADMIN — MIRTAZAPINE 15 MG: 15 TABLET, FILM COATED ORAL at 20:06

## 2022-04-06 RX ADMIN — SALMETEROL XINAFOATE 1 PUFF: 50 POWDER, METERED ORAL; RESPIRATORY (INHALATION) at 20:06

## 2022-04-06 RX ADMIN — GABAPENTIN 100 MG: 100 CAPSULE ORAL at 17:19

## 2022-04-06 RX ADMIN — MEGESTROL ACETATE 20 MG: 20 TABLET ORAL at 09:00

## 2022-04-06 RX ADMIN — GABAPENTIN 100 MG: 100 CAPSULE ORAL at 09:00

## 2022-04-06 NOTE — PLAN OF CARE
"  Problem: Behavior Regulation Impairment (Psychotic Signs/Symptoms)  Goal: Improved Behavioral Control (Psychotic Signs/Symptoms)  Outcome: Adequate for Care Transition     Problem: Cognitive Impairment (Psychotic Signs/Symptoms)  Goal: Optimal Cognitive Function (Psychotic Signs/Symptoms)  Outcome: Adequate for Care Transition   Goal Outcome Evaluation:    Patient is calm, medication compliant. Endorses depression related to prolonged hospital stay \"when am I getting the fuck out of here?\". Good appetite, ate 75% of meals. Isolative to room, did no attend groups or engage with peers. Writer offered patient assistance with shower, patient declined.    "

## 2022-04-06 NOTE — PLAN OF CARE
Assessment/Intervention/Current Symtoms and Care Coordination:  -Reviewed pt's record to get up to date with his current status and plan  -Rounded with team     -Patient was assessed by Cleve RODRIGUEZ at [facility]. Patient was accepted pending guardianship and CADI waiver approval.      Discharge Plan or Goal:  Discharge to [facility] or [facility] when guardianship have been established and CADI waiver gets activated.  LSS is working on streamlining guardianship     Barriers to Discharge:  History of aggression and elopement.  Lack of current guardian to make health decisions.   Several referrals made without acceptance      Referral Status:  None today

## 2022-04-06 NOTE — PLAN OF CARE
"  Problem: Behavior Regulation Impairment (Psychotic Signs/Symptoms)  Goal: Improved Behavioral Control (Psychotic Signs/Symptoms)  Outcome: Ongoing, Not Progressing   Goal Outcome Evaluation:                  Received awake at 0115, denied pain  Pt talked to staff with a mumbling speech that was difficult to understand, when asked what he said, angrily replied \" Just get the fuck out\".  Went to the toilet x 1  Slept for 9 hours.    "

## 2022-04-06 NOTE — PLAN OF CARE
Goal Outcome Evaluation:    Plan of Care Reviewed With: patient        Pt continues to isolate to room except for meals. Ate >75% of dinner. Poverty of thought and speech. One-word responses. Oriented to self only. Affect flat. Poor eye contact. Hygiene adequate. Gait steady. Cooperative. Med-compliant. Continue with current treatment plan and recommendations. Continue to monitor and reassess symptoms. Monitor response to medications. Monitor progress towards treatment goals. Encourage groups and participation

## 2022-04-06 NOTE — PLAN OF CARE
Patient presents with a blunted affect. Mood presents as calm. Is cooperative. Speech is mumbled. Eye contact is fair to poor. Alert x3. Patient denies depression, anxiety, suicidal ideation, SIB, and homicidal ideation. Patient denies auditory/visual hallucinations, yet appears to be responding to internal stimuli; as evidenced by, staring into space and lips moving as if talking to imaginary person or persons. No medical issues noted. Vital signs stable. Medication compliant. No medication cheeking noted. Ate evening meal. Patient out in the milieu briefly at start of this shift. Spent the rest of this shift in his room and in bed. No interaction with staff or peers noted. Did not shower this shift.    /82   Pulse 84   Temp 97.2  F (36.2  C) (Oral)   Resp 18   Ht 1.829 m (6')   Wt 87.4 kg (192 lb 9.6 oz)   SpO2 95%   BMI 26.12 kg/m       Super juice on [employer]

## 2022-04-07 PROCEDURE — 250N000013 HC RX MED GY IP 250 OP 250 PS 637: Performed by: PSYCHIATRY & NEUROLOGY

## 2022-04-07 PROCEDURE — 250N000013 HC RX MED GY IP 250 OP 250 PS 637: Performed by: PHYSICIAN ASSISTANT

## 2022-04-07 PROCEDURE — 124N000003 HC R&B MH SENIOR/ADOLESCENT

## 2022-04-07 PROCEDURE — 99231 SBSQ HOSP IP/OBS SF/LOW 25: CPT | Performed by: PSYCHIATRY & NEUROLOGY

## 2022-04-07 RX ADMIN — MEGESTROL ACETATE 20 MG: 20 TABLET ORAL at 08:37

## 2022-04-07 RX ADMIN — POLYETHYLENE GLYCOL 3350 17 G: 17 POWDER, FOR SOLUTION ORAL at 17:14

## 2022-04-07 RX ADMIN — CLOZAPINE 200 MG: 100 TABLET ORAL at 20:17

## 2022-04-07 RX ADMIN — LEVOTHYROXINE SODIUM 88 MCG: 0.09 TABLET ORAL at 08:39

## 2022-04-07 RX ADMIN — Medication 5 MG: at 20:17

## 2022-04-07 RX ADMIN — ASPIRIN 81 MG: 81 TABLET, COATED ORAL at 08:39

## 2022-04-07 RX ADMIN — GABAPENTIN 100 MG: 100 CAPSULE ORAL at 08:37

## 2022-04-07 RX ADMIN — BUSPIRONE HYDROCHLORIDE 30 MG: 15 TABLET ORAL at 20:17

## 2022-04-07 RX ADMIN — GABAPENTIN 100 MG: 100 CAPSULE ORAL at 17:14

## 2022-04-07 RX ADMIN — MIRTAZAPINE 15 MG: 15 TABLET, FILM COATED ORAL at 20:17

## 2022-04-07 RX ADMIN — GABAPENTIN 100 MG: 100 CAPSULE ORAL at 12:55

## 2022-04-07 RX ADMIN — HALOPERIDOL 1 MG: 1 TABLET ORAL at 20:17

## 2022-04-07 RX ADMIN — MEMANTINE 10 MG: 10 TABLET ORAL at 20:17

## 2022-04-07 RX ADMIN — BUSPIRONE HYDROCHLORIDE 30 MG: 15 TABLET ORAL at 08:39

## 2022-04-07 RX ADMIN — MEMANTINE 10 MG: 10 TABLET ORAL at 08:39

## 2022-04-07 RX ADMIN — DONEPEZIL HYDROCHLORIDE 10 MG: 10 TABLET ORAL at 20:17

## 2022-04-07 RX ADMIN — SALMETEROL XINAFOATE 1 PUFF: 50 POWDER, METERED ORAL; RESPIRATORY (INHALATION) at 20:18

## 2022-04-07 RX ADMIN — SALMETEROL XINAFOATE 1 PUFF: 50 POWDER, METERED ORAL; RESPIRATORY (INHALATION) at 08:40

## 2022-04-07 ASSESSMENT — ACTIVITIES OF DAILY LIVING (ADL)
LAUNDRY: UNABLE TO COMPLETE
HYGIENE/GROOMING: PROMPTS;INDEPENDENT
DRESS: PROMPTS;INDEPENDENT
ORAL_HYGIENE: PROMPTS;INDEPENDENT

## 2022-04-07 NOTE — PLAN OF CARE
"  Problem: Behavior Regulation Impairment (Psychotic Signs/Symptoms)  Goal: Improved Behavioral Control (Psychotic Signs/Symptoms)  Outcome: Met  Flowsheets (Taken 4/7/2022 1213)  Mutually Determined Action Steps (Improved Behavioral Control): verbalizes gratifying activity   Goal Outcome Evaluation:    Plan of Care Reviewed With: patient           Nursing Assessment    Psychosis (H) [F29]    Admit Date: 1/26/2022    Length of Stay: 71    Patient evaluation continues. Assessed mood,anxiety,thoughts and behavior. Patient is progressing towards goals. Patient is encouraged to participate in groups and assisted to develop healthy coping skills.  Patient reports auditory hallucinations but much less. Very quiet. BP (!) 124/97   Pulse 99   Temp 97.8  F (36.6  C) (Temporal)   Resp 16   Ht 1.778 m (5' 10\")   Wt 76.2 kg (168 lb 1.6 oz)   SpO2 98%   BMI 24.12 kg/m      Mood: good    Patient reports depression none and reports anxiety none    Affect: flat ,blunted    Sleep: good, naps during the day. Is encouraged to come out for groups    Appetite: good    SI: denies    HI: denies    SIB: denies      Medication Compliance yes    Group participation: none    ADL's: independent with prompts    Fall risk interventions: proper foot wear, orthostatic B/P    Rinku Score Interventions: none    Discharge planning in process    Refer to daily team meeting notes for individualized plan of care. Nursing will continue to assess.    *Scale is 1-10 and 10 is the worst.             "

## 2022-04-07 NOTE — PROGRESS NOTES
"PSYCHIATRY  PROGRESS NOTE     DATE OF SERVICE   04/06/2022       CHIEF COMPLAINT   \"I am feeling okay\"       SUBJECTIVE   Nursing reports: Continues to be at his baseline and no new behaviors have been reported.    Patient has been discussed with the  during team meeting.  There has been no updates on the patient's discharge     OBJECTIVE   Today patient was seen and evaluated at bedside by himself, this was a face-to-face evaluation.  Patient reported that he is doing good and that he is feeling better because he can hear the voice of his mother.  Continues to ask about leaving the hospital but when explaining to him that he is not able to understand his discharge plans.  For the most part patient has been isolative in his room, calm pleasant and cooperative.  Denies having any side effects from his medications.  Denies any suicidal ideations and has been able to contract for safety.     MEDICATIONS   Medications:  Scheduled Meds:    aspirin  81 mg Oral Daily     [Held by provider] atorvastatin  80 mg Oral At Bedtime     busPIRone HCl  30 mg Oral BID     cloZAPine  200 mg Oral At Bedtime     donepezil  10 mg Oral At Bedtime     gabapentin  100 mg Oral TID w/meals     haloperidol  1 mg Oral At Bedtime     levothyroxine  88 mcg Oral Daily     megestrol  20 mg Oral Daily     melatonin  5 mg Oral At Bedtime     memantine  10 mg Oral BID     mirtazapine  15 mg Oral At Bedtime     salmeterol  1 puff Inhalation BID     Continuous Infusions:  PRN Meds:.[Held by provider] acetaminophen, albuterol, alum & mag hydroxide-simethicone, benzocaine-menthol, hydrOXYzine, nicotine, polyethylene glycol, polyethylene glycol-propylene glycol PF, risperiDONE, senna-docusate, [Held by provider] traZODone    Medication adherence issues: MS Med Adherence Y/N: Yes, Hospitalization  Medication side effects: MEDICATION SIDE EFFECTS: no side effects reported  Benefit: Yes / No: Yes       ROS   A comprehensive review of systems was " "negative.       MENTAL STATUS EXAM   Vitals: /81 (BP Location: Right arm)   Pulse 100   Temp 98.3  F (36.8  C) (Oral)   Resp 16   Ht 1.778 m (5' 10\")   Wt 76.1 kg (167 lb 12.8 oz)   SpO2 97%   BMI 24.08 kg/m      Appearance:  No apparent distress  Mood: \"I am feeling okay\"  Affect: Calm and pleasant  Suicidal Ideation: denies   Homicidal Ideation: denies   Thought process: concrete  Thought content: confused and responding to internal stimuli.  He admitted having auditory hallucinations and continues to be delusional  Fund of Knowledge: Below average  Attention/Concentration: Easily distracted  Language ability: Significantly impaired  Memory: Global memory impairment  Insight:  Poor.  Judgement: Poor  Orientation: Only to person  Psychomotor Behavior: slowed    Muscle Strength and Tone: MuscleStrength: Normal and Atrophy  Gait and Station: Not evaluated       LABS   personally reviewed.     No results found for: PHENYTOIN, PHENOBARB, VALPROATE, CBMZ       DIAGNOSIS   Principal Problem:    Major neurocognitive disorder, due to multiple etiologies, with behavioral disturbance, severe (H)    Active Problem List:  Patient Active Problem List   Diagnosis     TBI with aggressive behavior     HTN (hypertension)     COPD (chronic obstructive pulmonary disease) (H)     Dementia with behavioral disturbance (H)     Chronic schizophrenia (H)     Smoker     Agitation     Behavior disturbance     Psychosis (H)     Major neurocognitive disorder, due to multiple etiologies, with behavioral disturbance, severe (H)     Paranoid schizophrenia, chronic condition with acute exacerbation (H)          PLAN   1. Ongoing education given regarding diagnostic and treatment options with risks, benefits and alternatives and adequate verbalization of understanding.  2.  Medications       BuSpar 30 mg 2 times daily       Clozaril 200 mg at bedtime       Aricept 10 mg at bedtime       Gabapentin 100 mg 3 times a day       Haldol 1 mg " at bedtime       Namenda 10 mg 2 times a day       Remeron 15 mg at bedtime  3.  Medical team following the patient as needed   4.   coordinating a safe discharge plan with the patient.    Risk Assessment: [facility] RISK ASSESSMENT: Patient able to contract for safety    Coordination of Care:   Treatment Plan reviewed and physician signed, Care discussed with Care/Treatment Team Members, Chart reviewed and Patient seen      Re-Certification I certify that the inpatient psychiatric facility services furnished since the previous certification were, and continue to be, medically necessary for, either, treatment which could reasonably be expected to improve the patient s condition or diagnostic study and that the hospital records indicate that the services furnished were, either, intensive treatment services, admission and related services necessary for diagnostic study, or equivalent services.     I certify that the patient continues to need, on a daily basis, active treatment furnished directly by or requiring the supervision of inpatient psychiatric facility personnel.   I estimate 14 days of hospitalization is necessary for proper treatment of the patient. My plans for post-hospital care for this patient are  TBD     Ginger Dubon MD    -     04/06/2022  -     7:52 PM    Total time 15 minutes with > 50%spent on coordination of cares and psycho-education.    This note was created with help of Dragon dictation system. Grammatical / typing errors are not intentional.    Ginger Dubon MD

## 2022-04-07 NOTE — PLAN OF CARE
Goal Outcome Evaluation:    Plan of Care Reviewed With: patient        Pt continues to isolate to room except for meals. Ate >75% of dinner. Poverty of thought and speech. One-word responses. Oriented to self only. Affect flat. Poor eye contact. Hygiene adequate. Gait steady. Cooperative. Med-compliant. Continue with current treatment plan and recommendations. Continue to monitor and reassess symptoms. Monitor response to medications. Monitor progress towards treatment goals. Encourage groups and participation.

## 2022-04-07 NOTE — PLAN OF CARE
Assessment/Intervention/Current Symtoms and Care Coordination:  -Reviewed pt's record to get up to date with his current status and plan  -Rounded with team     -Patient was assessed by Cleve RODRIGUEZ at [facility]. Patient was accepted pending guardianship and CADI waiver approval.      Discharge Plan or Goal:  Discharge to [facility] or [facility] when guardianship have been established and CADI waiver gets activated.  LSS is working on streamlining guardianship     Barriers to Discharge:  History of aggression and elopement.  Lack of current guardian to make health decisions.   Several referrals made without acceptance      Referral Status:  None today

## 2022-04-08 PROCEDURE — 124N000003 HC R&B MH SENIOR/ADOLESCENT

## 2022-04-08 PROCEDURE — 99231 SBSQ HOSP IP/OBS SF/LOW 25: CPT | Performed by: PSYCHIATRY & NEUROLOGY

## 2022-04-08 PROCEDURE — 250N000013 HC RX MED GY IP 250 OP 250 PS 637: Performed by: PSYCHIATRY & NEUROLOGY

## 2022-04-08 RX ADMIN — HALOPERIDOL 1 MG: 1 TABLET ORAL at 20:13

## 2022-04-08 RX ADMIN — MEGESTROL ACETATE 20 MG: 20 TABLET ORAL at 08:05

## 2022-04-08 RX ADMIN — ASPIRIN 81 MG: 81 TABLET, COATED ORAL at 08:05

## 2022-04-08 RX ADMIN — LEVOTHYROXINE SODIUM 88 MCG: 0.09 TABLET ORAL at 08:05

## 2022-04-08 RX ADMIN — SALMETEROL XINAFOATE 1 PUFF: 50 POWDER, METERED ORAL; RESPIRATORY (INHALATION) at 20:14

## 2022-04-08 RX ADMIN — GABAPENTIN 100 MG: 100 CAPSULE ORAL at 08:05

## 2022-04-08 RX ADMIN — MIRTAZAPINE 15 MG: 15 TABLET, FILM COATED ORAL at 20:13

## 2022-04-08 RX ADMIN — SALMETEROL XINAFOATE 1 PUFF: 50 POWDER, METERED ORAL; RESPIRATORY (INHALATION) at 08:05

## 2022-04-08 RX ADMIN — MEMANTINE 10 MG: 10 TABLET ORAL at 20:13

## 2022-04-08 RX ADMIN — GABAPENTIN 100 MG: 100 CAPSULE ORAL at 13:01

## 2022-04-08 RX ADMIN — MEMANTINE 10 MG: 10 TABLET ORAL at 08:05

## 2022-04-08 RX ADMIN — BUSPIRONE HYDROCHLORIDE 30 MG: 15 TABLET ORAL at 20:13

## 2022-04-08 RX ADMIN — Medication 5 MG: at 20:13

## 2022-04-08 RX ADMIN — CLOZAPINE 200 MG: 100 TABLET ORAL at 20:13

## 2022-04-08 RX ADMIN — GABAPENTIN 100 MG: 100 CAPSULE ORAL at 17:27

## 2022-04-08 RX ADMIN — BUSPIRONE HYDROCHLORIDE 30 MG: 15 TABLET ORAL at 08:05

## 2022-04-08 RX ADMIN — DONEPEZIL HYDROCHLORIDE 10 MG: 10 TABLET ORAL at 20:13

## 2022-04-08 ASSESSMENT — ACTIVITIES OF DAILY LIVING (ADL)
HYGIENE/GROOMING: PROMPTS;INDEPENDENT
ORAL_HYGIENE: PROMPTS;INDEPENDENT
LAUNDRY: UNABLE TO COMPLETE
DRESS: PROMPTS;INDEPENDENT

## 2022-04-08 NOTE — PROGRESS NOTES
"PSYCHIATRY  PROGRESS NOTE     DATE OF SERVICE   04/08/2022       CHIEF COMPLAINT   \"When is my commitment ending?\"       SUBJECTIVE   Nursing reports: Patient affect is flat. His mood is calm. He is isolative and withdrawn. He denies SI and SIB. He endorses AH stating \"its my mom telling me she loves me\". He denies depression and anxiety. He refused to eat dinner stating \"I need to take a shit and I cant\". Patient was given miralax with results. He came to the dining room and ate 100%. He is alert to self only. He stated it was 2099, \"dumpTrump\" was the president and he was at [facility]. This writer explained it was 2022, Roxanne was the president and he was at [place]. He stated \"Ina benitez is the only president in history that was elected 3 times\". He is unkept, disheveled with poor hygiene. He was offered a shower and refused. He is medication compliant. He denies pain.    Patient has been discussed with the  during team meeting.  There has been no updates on the patient's discharge     OBJECTIVE   Patient was seen and evaluated at bedside by himself, this was a face-to-face evaluation.  Patient continues to be confused and unable to process information.  Patient asked this writer when his commitment is ending and then tells me that the commitment already ended.  Patient is demanding to leave the hospital.  Again I discussed with the patient that the  is looking into different facilities that can accept him.  Patient verbalized poor understanding of this.       MEDICATIONS   Medications:  Scheduled Meds:    aspirin  81 mg Oral Daily     [Held by provider] atorvastatin  80 mg Oral At Bedtime     busPIRone HCl  30 mg Oral BID     cloZAPine  200 mg Oral At Bedtime     donepezil  10 mg Oral At Bedtime     gabapentin  100 mg Oral TID w/meals     haloperidol  1 mg Oral At Bedtime     levothyroxine  88 mcg Oral Daily     megestrol  20 mg Oral Daily     melatonin  5 mg Oral At " "Bedtime     memantine  10 mg Oral BID     mirtazapine  15 mg Oral At Bedtime     salmeterol  1 puff Inhalation BID     Continuous Infusions:  PRN Meds:.[Held by provider] acetaminophen, albuterol, alum & mag hydroxide-simethicone, benzocaine-menthol, hydrOXYzine, nicotine, polyethylene glycol, polyethylene glycol-propylene glycol PF, risperiDONE, senna-docusate, [Held by provider] traZODone    Medication adherence issues: MS Med Adherence Y/N: Yes, Hospitalization  Medication side effects: MEDICATION SIDE EFFECTS: no side effects reported  Benefit: Yes / No: Yes       ROS   A comprehensive review of systems was negative.       MENTAL STATUS EXAM   Vitals: BP (!) 124/91 (BP Location: Right arm)   Pulse 112   Temp 98  F (36.7  C) (Oral)   Resp 16   Ht 1.778 m (5' 10\")   Wt 76.2 kg (168 lb 1.6 oz)   SpO2 100%   BMI 24.12 kg/m      Appearance:  No apparent distress  Mood: \"When it is my commitment ending?\"  Affect: Calm  Suicidal Ideation: denies   Homicidal Ideation: denies   Thought process: concrete  Thought content: confused and responding to internal stimuli.  Having auditory hallucinations listening to the voice of his mother.  Fund of Knowledge: Below average  Attention/Concentration: Easily distracted  Language ability: Significantly impaired  Memory: Global memory impairment  Insight:  Poor.  Judgement: Poor  Orientation: Only to person  Psychomotor Behavior: slowed    Muscle Strength and Tone: MuscleStrength: Normal and Atrophy  Gait and Station: Not evaluated       LABS   personally reviewed.     No results found for: PHENYTOIN, PHENOBARB, VALPROATE, CBMZ       DIAGNOSIS   Principal Problem:    Major neurocognitive disorder, due to multiple etiologies, with behavioral disturbance, severe (H)    Active Problem List:  Patient Active Problem List   Diagnosis     TBI with aggressive behavior     HTN (hypertension)     COPD (chronic obstructive pulmonary disease) (H)     Dementia with behavioral disturbance " (H)     Chronic schizophrenia (H)     Smoker     Agitation     Behavior disturbance     Psychosis (H)     Major neurocognitive disorder, due to multiple etiologies, with behavioral disturbance, severe (H)     Paranoid schizophrenia, chronic condition with acute exacerbation (H)          PLAN   1. Ongoing education given regarding diagnostic and treatment options with risks, benefits and alternatives and adequate verbalization of understanding.  2.  Medications       BuSpar 30 mg 2 times daily       Clozaril 200 mg at bedtime       Aricept 10 mg at bedtime       Gabapentin 100 mg 3 times a day       Haldol 1 mg at bedtime       Namenda 10 mg 2 times a day       Remeron 15 mg at bedtime  3.  Medical team following the patient as needed   4.   coordinating a safe discharge plan with the patient.    Risk Assessment: [facility] RISK ASSESSMENT: Patient able to contract for safety    Coordination of Care:   Treatment Plan reviewed and physician signed, Care discussed with Care/Treatment Team Members, Chart reviewed and Patient seen      Re-Certification I certify that the inpatient psychiatric facility services furnished since the previous certification were, and continue to be, medically necessary for, either, treatment which could reasonably be expected to improve the patient s condition or diagnostic study and that the hospital records indicate that the services furnished were, either, intensive treatment services, admission and related services necessary for diagnostic study, or equivalent services.     I certify that the patient continues to need, on a daily basis, active treatment furnished directly by or requiring the supervision of inpatient psychiatric facility personnel.   I estimate 14 days of hospitalization is necessary for proper treatment of the patient. My plans for post-hospital care for this patient are  TBD     Ginger Dubon MD    -     04/08/2022  -     2:07 PM    Total time 15  minutes with > 50%spent on coordination of cares and psycho-education.    This note was created with help of Dragon dictation system. Grammatical / typing errors are not intentional.    Ginger Dubon MD

## 2022-04-08 NOTE — PROGRESS NOTES
"PSYCHIATRY  PROGRESS NOTE     DATE OF SERVICE   04/07/2022  The patient is a 56 year old female who is being evaluated via a video billable telemedicine visit. The patient/guardian has consented to being seen via telemedicine. The provider was in front of a computer in a home office. The patient was on the inpatient unit at [facility].     Start time: 11:05 AM  Stop time: 11:15 AM    The patient/guardian has been notified of the following:     This telemedicine visit is conducted live between you and your clinician. We have found that certain health care needs can be provided without the need for a physical exam. This service lets us provide the care you need with a telemedicine conversation.           CHIEF COMPLAINT   \"I want to get out of here\"       SUBJECTIVE   Nursing reports: Patient remains at his baseline and no new behaviors have been reported.    Patient has been discussed with the  during team meeting.  There has been no updates on the patient's discharge     OBJECTIVE   Today patient was seen and evaluated at bedside by himself, this was a face-to-face evaluation.  Today patient seem more disengaged and was constantly asking when he will be leaving the hospital.  Patient continues to have poor insight into his psychiatric illness and the discharge plans.  Patient said that he he has at home and would like to discharge there.  I have clarified with the patient on numerous occasions that he will be discharging to a nursing home but he does not seem to be processing this information.  This     MEDICATIONS   Medications:  Scheduled Meds:    aspirin  81 mg Oral Daily     [Held by provider] atorvastatin  80 mg Oral At Bedtime     busPIRone HCl  30 mg Oral BID     cloZAPine  200 mg Oral At Bedtime     donepezil  10 mg Oral At Bedtime     gabapentin  100 mg Oral TID w/meals     haloperidol  1 mg Oral At Bedtime     levothyroxine  88 mcg Oral Daily     megestrol  20 mg Oral Daily     " "melatonin  5 mg Oral At Bedtime     memantine  10 mg Oral BID     mirtazapine  15 mg Oral At Bedtime     salmeterol  1 puff Inhalation BID     Continuous Infusions:  PRN Meds:.[Held by provider] acetaminophen, albuterol, alum & mag hydroxide-simethicone, benzocaine-menthol, hydrOXYzine, nicotine, polyethylene glycol, polyethylene glycol-propylene glycol PF, risperiDONE, senna-docusate, [Held by provider] traZODone    Medication adherence issues: MS Med Adherence Y/N: Yes, Hospitalization  Medication side effects: MEDICATION SIDE EFFECTS: no side effects reported  Benefit: Yes / No: Yes       ROS   A comprehensive review of systems was negative.       MENTAL STATUS EXAM   Vitals: BP 97/66 (BP Location: Right arm, Patient Position: Sitting, Cuff Size: Adult Regular)   Pulse 111   Temp 98.7  F (37.1  C) (Oral)   Resp 16   Ht 1.778 m (5' 10\")   Wt 76.2 kg (168 lb 1.6 oz)   SpO2 98%   BMI 24.12 kg/m      Appearance:  No apparent distress  Mood: \"I  want to be in the hospital\"  Affect: Calm  Suicidal Ideation: denies   Homicidal Ideation: denies   Thought process: concrete  Thought content: confused and responding to internal stimuli.  He admitted having auditory hallucinations and continues to be delusional  Fund of Knowledge: Below average  Attention/Concentration: Easily distracted  Language ability: Significantly impaired  Memory: Global memory impairment  Insight:  Poor.  Judgement: Poor  Orientation: Only to person  Psychomotor Behavior: slowed    Muscle Strength and Tone: MuscleStrength: Normal and Atrophy  Gait and Station: Not evaluated       LABS   personally reviewed.     No results found for: PHENYTOIN, PHENOBARB, VALPROATE, CBMZ       DIAGNOSIS   Principal Problem:    Major neurocognitive disorder, due to multiple etiologies, with behavioral disturbance, severe (H)    Active Problem List:  Patient Active Problem List   Diagnosis     TBI with aggressive behavior     HTN (hypertension)     COPD (chronic " obstructive pulmonary disease) (H)     Dementia with behavioral disturbance (H)     Chronic schizophrenia (H)     Smoker     Agitation     Behavior disturbance     Psychosis (H)     Major neurocognitive disorder, due to multiple etiologies, with behavioral disturbance, severe (H)     Paranoid schizophrenia, chronic condition with acute exacerbation (H)          PLAN   1. Ongoing education given regarding diagnostic and treatment options with risks, benefits and alternatives and adequate verbalization of understanding.  2.  Medications       BuSpar 30 mg 2 times daily       Clozaril 200 mg at bedtime       Aricept 10 mg at bedtime       Gabapentin 100 mg 3 times a day       Haldol 1 mg at bedtime       Namenda 10 mg 2 times a day       Remeron 15 mg at bedtime  3.  Medical team following the patient as needed   4.   coordinating a safe discharge plan with the patient.    Risk Assessment: [facility] RISK ASSESSMENT: Patient able to contract for safety    Coordination of Care:   Treatment Plan reviewed and physician signed, Care discussed with Care/Treatment Team Members, Chart reviewed and Patient seen      Re-Certification I certify that the inpatient psychiatric facility services furnished since the previous certification were, and continue to be, medically necessary for, either, treatment which could reasonably be expected to improve the patient s condition or diagnostic study and that the hospital records indicate that the services furnished were, either, intensive treatment services, admission and related services necessary for diagnostic study, or equivalent services.     I certify that the patient continues to need, on a daily basis, active treatment furnished directly by or requiring the supervision of inpatient psychiatric facility personnel.   I estimate 14 days of hospitalization is necessary for proper treatment of the patient. My plans for post-hospital care for this patient are  TBD     Ginger  MD Ling    -     04/07/2022  -     7:44 PM    Total time 15 minutes with > 50%spent on coordination of cares and psycho-education.    This note was created with help of Dragon dictation system. Grammatical / typing errors are not intentional.    Ginger Dubon MD

## 2022-04-08 NOTE — PLAN OF CARE
"Goal Outcome Evaluation:    Plan of Care Reviewed With: patient     Patient affect is flat. His mood is calm. He is isolative and withdrawn. He denies SI and SIB. He endorses  stating \"its my mom telling me she loves me\". He denies depression and anxiety. He refused to eat dinner stating \"I need to take a shit and I cant\". Patient was given miralax with results. He came to the dining room and ate 100%. He is alert to self only. He stated it was 2099, \"Saad\" was the president and he was at [facility]. This writer explained it was 2022, Roxanne was the president and he was at [place]. He stated \"Ina benitez is the only president in history that was elected 3 times\". He is unkept, disheveled with poor hygiene. He was offered a shower and refused. He is medication compliant. He denies pain.                "

## 2022-04-08 NOTE — PROGRESS NOTES
Pt appeared to be sleeping a total of 11 hours this shift. No concerns noted or reported this shift.

## 2022-04-08 NOTE — PLAN OF CARE
04/08/22 1605   Group Therapy Session   Group Attendance refused to attend group session         Pt is pleasant on approach of invitation to all OT groups though continues to decline invitations. Will continue to invite and encourage attendance.

## 2022-04-08 NOTE — PLAN OF CARE
04/08/22 1208   Individualization/Patient Specific Goals   Patient Personal Strengths resourceful;resilient   Patient Vulnerabilities limited social skills;limited support system;lacks insight into illness   Team Discussion   Participants Dr. Otilia Dubon; Wen Broderick RN; Maile Wan [facility]; Joi Stearns OT   Progress patient is at baseline   Anticipated length of stay 3-5 weeks pending guardianship approval and funding for placement   Continued Stay Criteria/Rationale guardianship/placement funding not established   Medical/Physical stroke history, weight loss   Plan Discharge to [facility] once barriers have been eliminated.   Rationale for change in precautions or plan no change   Anticipated Discharge Disposition assisted living;nursing/skilled nursing care;basic nursing care/long-term care;group home     PRECAUTIONS AND SAFETY    Behavioral Orders   Procedures    Code 1 - Restrict to Unit    Code 2     For abdominal US    Fall precautions    Routine Programming     As clinically indicated    Status 15     Every 15 minutes.       Safety  Safety WDL: WDL  Patient Location: dining room  Observed Behavior: calm, sitting  Observed Behavior (Comment): staring at fish tank  Safety Measures: environmental rounds completed, safety plan mutually developed, safety plan reviewed, safety rounds completed  Diversional Activity: television  Suicidality: Status 15  Assault: status 15  Elopement: Statements about wanting to leave  Elopement: status 15, no shoes  Sexual: status 15

## 2022-04-09 PROCEDURE — 124N000003 HC R&B MH SENIOR/ADOLESCENT

## 2022-04-09 PROCEDURE — 250N000013 HC RX MED GY IP 250 OP 250 PS 637: Performed by: PSYCHIATRY & NEUROLOGY

## 2022-04-09 RX ADMIN — HALOPERIDOL 1 MG: 1 TABLET ORAL at 20:24

## 2022-04-09 RX ADMIN — GABAPENTIN 100 MG: 100 CAPSULE ORAL at 14:13

## 2022-04-09 RX ADMIN — MEMANTINE 10 MG: 10 TABLET ORAL at 20:24

## 2022-04-09 RX ADMIN — MEMANTINE 10 MG: 10 TABLET ORAL at 08:15

## 2022-04-09 RX ADMIN — GABAPENTIN 100 MG: 100 CAPSULE ORAL at 08:14

## 2022-04-09 RX ADMIN — LEVOTHYROXINE SODIUM 88 MCG: 0.09 TABLET ORAL at 08:14

## 2022-04-09 RX ADMIN — DONEPEZIL HYDROCHLORIDE 10 MG: 10 TABLET ORAL at 20:24

## 2022-04-09 RX ADMIN — BUSPIRONE HYDROCHLORIDE 30 MG: 15 TABLET ORAL at 20:24

## 2022-04-09 RX ADMIN — ASPIRIN 81 MG: 81 TABLET, COATED ORAL at 08:14

## 2022-04-09 RX ADMIN — SALMETEROL XINAFOATE 1 PUFF: 50 POWDER, METERED ORAL; RESPIRATORY (INHALATION) at 20:24

## 2022-04-09 RX ADMIN — Medication 5 MG: at 20:24

## 2022-04-09 RX ADMIN — BUSPIRONE HYDROCHLORIDE 30 MG: 15 TABLET ORAL at 08:15

## 2022-04-09 RX ADMIN — SALMETEROL XINAFOATE 1 PUFF: 50 POWDER, METERED ORAL; RESPIRATORY (INHALATION) at 08:14

## 2022-04-09 RX ADMIN — CLOZAPINE 200 MG: 100 TABLET ORAL at 20:24

## 2022-04-09 RX ADMIN — MEGESTROL ACETATE 20 MG: 20 TABLET ORAL at 08:15

## 2022-04-09 RX ADMIN — GABAPENTIN 100 MG: 100 CAPSULE ORAL at 17:07

## 2022-04-09 RX ADMIN — MIRTAZAPINE 15 MG: 15 TABLET, FILM COATED ORAL at 20:24

## 2022-04-09 ASSESSMENT — ACTIVITIES OF DAILY LIVING (ADL)
DRESS: INDEPENDENT
ORAL_HYGIENE: PROMPTS;INDEPENDENT
DRESS: PROMPTS;INDEPENDENT;SCRUBS (BEHAVIORAL HEALTH)
HYGIENE/GROOMING: PROMPTS;INDEPENDENT
LAUNDRY: UNABLE TO COMPLETE
HYGIENE/GROOMING: PROMPTS
ORAL_HYGIENE: PROMPTS

## 2022-04-09 NOTE — PLAN OF CARE
"  Problem: Cognitive Impairment (Psychotic Signs/Symptoms)  Goal: Optimal Cognitive Function (Psychotic Signs/Symptoms)  Outcome: Ongoing, Not Progressing  Intervention: Support and Promote Cognitive Ability  Recent Flowsheet Documentation  Taken 4/9/2022 1000 by Juarez Murphy RN  Trust Relationship/Rapport:   care explained   questions answered   questions encouraged     Problem: Decreased Participation and Engagement (Psychotic Signs/Symptoms)  Goal: Increased Participation and Engagement (Psychotic Signs/Symptoms)  Outcome: Ongoing, Not Progressing   Goal Outcome Evaluation:    Plan of Care Reviewed With: patient     Problem: Behavior Regulation Impairment (Psychotic Signs/Symptoms)  Goal: Improved Behavioral Control (Psychotic Signs/Symptoms)  Outcome: Ongoing, Progressing     Problem: Mood Impairment (Psychotic Signs/Symptoms)  Goal: Improved Mood Symptoms (Psychotic Signs/Symptoms)  Outcome: Ongoing, Progressing     Patient is calm, medication compliant. Remains isolative and withdrawn, declined offer to shower or change scrubs and underwear. Patient is alert to self, denies anxiety, depression, SI, SIB, and hallucinations. Patient is making grandiose statements about owning 4 mansions, 2 in [place], 1 in [place], and 1 in [place]. Patient would like his  to know about these places for placement to get him \"the fuck out of here\". Good appetite, ate 75% of meals, not social with peers or staff, did not attend groups.  "

## 2022-04-09 NOTE — PLAN OF CARE
"Goal Outcome Evaluation:    Plan of Care Reviewed With: patient     Patient has been isolative and withdrawn. His affect is flat. His mood is calm. He denies SI and SIB. He endorses anxiety and depression stating its from being in here for so long and not being able to have a cigarette. He stated \"when am I gonna get the fuck out of here, why cant people do their jobs and figure it out\". He denies AH tonight. He denies pain. He did not attend groups. He is not social with peers and staff. He again refused to shower after being approached by 2 staff stating he will take one in the morning. He is medication compliant. He is alert to self only.               "

## 2022-04-09 NOTE — PROGRESS NOTES
Pt appeared to be sleeping comfortably, a total of about 10 hours. No concerns noted/reported this shift.

## 2022-04-10 PROCEDURE — 124N000003 HC R&B MH SENIOR/ADOLESCENT

## 2022-04-10 PROCEDURE — 250N000013 HC RX MED GY IP 250 OP 250 PS 637: Performed by: PSYCHIATRY & NEUROLOGY

## 2022-04-10 RX ADMIN — BUSPIRONE HYDROCHLORIDE 30 MG: 15 TABLET ORAL at 08:10

## 2022-04-10 RX ADMIN — Medication 5 MG: at 20:12

## 2022-04-10 RX ADMIN — ASPIRIN 81 MG: 81 TABLET, COATED ORAL at 08:10

## 2022-04-10 RX ADMIN — CLOZAPINE 200 MG: 100 TABLET ORAL at 20:11

## 2022-04-10 RX ADMIN — MEMANTINE 10 MG: 10 TABLET ORAL at 20:12

## 2022-04-10 RX ADMIN — MEMANTINE 10 MG: 10 TABLET ORAL at 08:10

## 2022-04-10 RX ADMIN — LEVOTHYROXINE SODIUM 88 MCG: 0.09 TABLET ORAL at 08:10

## 2022-04-10 RX ADMIN — MIRTAZAPINE 15 MG: 15 TABLET, FILM COATED ORAL at 20:12

## 2022-04-10 RX ADMIN — GABAPENTIN 100 MG: 100 CAPSULE ORAL at 08:10

## 2022-04-10 RX ADMIN — GABAPENTIN 100 MG: 100 CAPSULE ORAL at 17:05

## 2022-04-10 RX ADMIN — SALMETEROL XINAFOATE 1 PUFF: 50 POWDER, METERED ORAL; RESPIRATORY (INHALATION) at 20:12

## 2022-04-10 RX ADMIN — DONEPEZIL HYDROCHLORIDE 10 MG: 10 TABLET ORAL at 20:12

## 2022-04-10 RX ADMIN — MEGESTROL ACETATE 20 MG: 20 TABLET ORAL at 08:10

## 2022-04-10 RX ADMIN — GABAPENTIN 100 MG: 100 CAPSULE ORAL at 12:08

## 2022-04-10 RX ADMIN — HALOPERIDOL 1 MG: 1 TABLET ORAL at 20:12

## 2022-04-10 RX ADMIN — BUSPIRONE HYDROCHLORIDE 30 MG: 15 TABLET ORAL at 20:11

## 2022-04-10 RX ADMIN — SALMETEROL XINAFOATE 1 PUFF: 50 POWDER, METERED ORAL; RESPIRATORY (INHALATION) at 08:10

## 2022-04-10 ASSESSMENT — ACTIVITIES OF DAILY LIVING (ADL)
LAUNDRY: UNABLE TO COMPLETE
HYGIENE/GROOMING: INDEPENDENT;PROMPTS
DRESS: INDEPENDENT;PROMPTS
HYGIENE/GROOMING: SHOWER
ORAL_HYGIENE: PROMPTS;INDEPENDENT
DRESS: INDEPENDENT;PROMPTS
HYGIENE/GROOMING: PROMPTS;INDEPENDENT
ORAL_HYGIENE: INDEPENDENT;PROMPTS

## 2022-04-10 NOTE — PLAN OF CARE
"Goal Outcome Evaluation:    Plan of Care Reviewed With: patient     Patient has been isolative and withdrawn. His affect is flat. His mood is calm. He denies all MH symptoms. He endorses AH stating \"I always have them\". He states he hears the voices of his mom and sister. He is alert to self only. He refused to attend groups. He refused to shower. He did change into clean scrubs, however, he is unkept and disheveled. He came out of his room for dinner only and ate 100%. He is not social with peers and staff. He is medication compliant. He denies pain.                 "

## 2022-04-10 NOTE — PLAN OF CARE
Problem: Sleep Disturbance (Psychotic Signs/Symptoms)  Goal: Improved Sleep (Psychotic Signs/Symptoms)  Outcome: Ongoing, Progressing   Goal Outcome Evaluation:        Slept well for 10 hours  No behavioral issues encountered this shift.

## 2022-04-10 NOTE — PLAN OF CARE
Problem: Decreased Participation and Engagement (Psychotic Signs/Symptoms)  Goal: Increased Participation and Engagement (Psychotic Signs/Symptoms)  Outcome: Ongoing, Not Progressing     Problem: Behavior Regulation Impairment (Psychotic Signs/Symptoms)  Goal: Improved Behavioral Control (Psychotic Signs/Symptoms)  Outcome: Ongoing, Progressing   Goal Outcome Evaluation:    Plan of Care Reviewed With: patient     Patient is calm cooperative, medication compliant. Had a shower today, remains isolative and withdrawn. Did not attend groups, not social with peers. Good appetite, ate 75% of meals. Denies SI/SIB, Hallucinations, anxiety and depression.

## 2022-04-11 PROCEDURE — 250N000013 HC RX MED GY IP 250 OP 250 PS 637: Performed by: PSYCHIATRY & NEUROLOGY

## 2022-04-11 PROCEDURE — 124N000003 HC R&B MH SENIOR/ADOLESCENT

## 2022-04-11 PROCEDURE — 99231 SBSQ HOSP IP/OBS SF/LOW 25: CPT | Performed by: PSYCHIATRY & NEUROLOGY

## 2022-04-11 PROCEDURE — 99207 PR NO BILLABLE SERVICE THIS VISIT: CPT | Performed by: PHYSICIAN ASSISTANT

## 2022-04-11 RX ADMIN — MEMANTINE 10 MG: 10 TABLET ORAL at 20:44

## 2022-04-11 RX ADMIN — GABAPENTIN 100 MG: 100 CAPSULE ORAL at 17:26

## 2022-04-11 RX ADMIN — CLOZAPINE 200 MG: 100 TABLET ORAL at 20:43

## 2022-04-11 RX ADMIN — GABAPENTIN 100 MG: 100 CAPSULE ORAL at 12:58

## 2022-04-11 RX ADMIN — ASPIRIN 81 MG: 81 TABLET, COATED ORAL at 09:03

## 2022-04-11 RX ADMIN — BUSPIRONE HYDROCHLORIDE 30 MG: 15 TABLET ORAL at 20:44

## 2022-04-11 RX ADMIN — SALMETEROL XINAFOATE 1 PUFF: 50 POWDER, METERED ORAL; RESPIRATORY (INHALATION) at 20:44

## 2022-04-11 RX ADMIN — Medication 5 MG: at 20:44

## 2022-04-11 RX ADMIN — LEVOTHYROXINE SODIUM 88 MCG: 0.09 TABLET ORAL at 09:02

## 2022-04-11 RX ADMIN — MIRTAZAPINE 15 MG: 15 TABLET, FILM COATED ORAL at 20:44

## 2022-04-11 RX ADMIN — MEMANTINE 10 MG: 10 TABLET ORAL at 09:02

## 2022-04-11 RX ADMIN — BUSPIRONE HYDROCHLORIDE 30 MG: 15 TABLET ORAL at 09:02

## 2022-04-11 RX ADMIN — MEGESTROL ACETATE 20 MG: 20 TABLET ORAL at 09:02

## 2022-04-11 RX ADMIN — DONEPEZIL HYDROCHLORIDE 10 MG: 10 TABLET ORAL at 20:44

## 2022-04-11 RX ADMIN — GABAPENTIN 100 MG: 100 CAPSULE ORAL at 09:02

## 2022-04-11 RX ADMIN — HALOPERIDOL 1 MG: 1 TABLET ORAL at 20:44

## 2022-04-11 RX ADMIN — SALMETEROL XINAFOATE 1 PUFF: 50 POWDER, METERED ORAL; RESPIRATORY (INHALATION) at 09:01

## 2022-04-11 ASSESSMENT — ACTIVITIES OF DAILY LIVING (ADL)
HYGIENE/GROOMING: PROMPTS
ORAL_HYGIENE: PROMPTS
LAUNDRY: UNABLE TO COMPLETE
ORAL_HYGIENE: PROMPTS
DRESS: PROMPTS;SCRUBS (BEHAVIORAL HEALTH)
DRESS: PROMPTS
HYGIENE/GROOMING: PROMPTS

## 2022-04-11 NOTE — PLAN OF CARE
"  Problem: Fall Injury Risk  Goal: Absence of Fall and Fall-Related Injury  Outcome: Ongoing, Not Progressing     Problem: Mood Impairment (Psychotic Signs/Symptoms)  Goal: Improved Mood Symptoms (Psychotic Signs/Symptoms)  Outcome: Ongoing, Not Progressing   Goal Outcome Evaluation:        Per staff and patient's peer report-Pt stood from dining room chair this AM and caught his foot on the chair and sat on the floor layed back and bumped his head on plastic garbage can.  Pt stood and walked to his room. Vitals signs taken as patient allowed. Declined to do standing vital signs.   Pt laying in his bed when writer approached and was irritated and swearing. When writer asked who he was talking to he stated \"my brother in law\".  Pt denied pain. Pt allowed B/P to be taken laying and then sitting after 1 minute. Pt refused to stand. Pt was medication compliant.  No/medical team informed and assessed patient.     Pt's affect remains flat with inconsistent eye contact. Pt has been calm with brief periods of irritability. Pt has been isolative to his room for most of the shift with only coming out for meals with prompts. Pt believed it was March 19th of 1999. Pt asked if he had been here for 8 years. Pt then went on to ask if he could leave and go to his \"[facility] in [place]\". Writer asked him when the last time he lived there and his response was \"never\".   Pt has eaten fair at both meals with mid morning snack of a banana and 1% milk.               "

## 2022-04-11 NOTE — PLAN OF CARE
Goal Outcome Evaluation:    Plan of Care Reviewed With: patient        Pt continues to isolate to room except for meals. Ate >50% of dinner. Poverty of thought and speech. One-word responses. Oriented to self only. Affect flat. Poor eye contact. Hygiene adequate. Gait steady. Refused to allow assessment of standing BP. Med-compliant. Declined COVID swab. Continue to encourage pt to change position slowly. Continue to encourage hydration. Continue with current treatment plan and recommendations. Continue to monitor and reassess symptoms. Monitor response to medications. Monitor progress towards treatment goals. Encourage groups and participation

## 2022-04-11 NOTE — PROGRESS NOTES
"PSYCHIATRY  PROGRESS NOTE     DATE OF SERVICE   04/11/2022       CHIEF COMPLAINT   \"I am fine\"       SUBJECTIVE   Nursing reports: Behaviorally the patient continues to be the same and seems to be at his baseline.  Early this morning patient fell.  As per staff and peers \"Pt stood from dining room chair this AM and caught his foot on the chair and sat on the floor layed back and bumped his head on plastic garbage can.\"  Patient denied feeling dizziness, lightheadedness, chest pain or shortness of breath.  Nursing team has been in communication with the medical team and they will assess the patient.    Patient has been discussed with the  during team meeting.  There has been no updates on the patient's discharge     OBJECTIVE   Patient was seen and evaluated at bedside by himself, this was a face-to-face evaluation.  Patient reported that he is doing fine and at the moment of the assessment he denies feeling wheezy, denies pain and denies any shortness of breath.  When I asked the patient what happened earlier patient stated \"my legs just gave up\".  He was not able to clarify this statement but he continues to insist that he is doing okay.  When talking about his psychiatric symptoms patient said that he has not experienced auditory hallucinations today.  He continues to ask about his discharge date but when I updated him he is seem to have poor understanding of the information provided to him.     MEDICATIONS   Medications:  Scheduled Meds:    aspirin  81 mg Oral Daily     [Held by provider] atorvastatin  80 mg Oral At Bedtime     busPIRone HCl  30 mg Oral BID     cloZAPine  200 mg Oral At Bedtime     donepezil  10 mg Oral At Bedtime     gabapentin  100 mg Oral TID w/meals     haloperidol  1 mg Oral At Bedtime     levothyroxine  88 mcg Oral Daily     megestrol  20 mg Oral Daily     melatonin  5 mg Oral At Bedtime     memantine  10 mg Oral BID     mirtazapine  15 mg Oral At Bedtime     salmeterol  1 puff " "Inhalation BID     Continuous Infusions:  PRN Meds:.[Held by provider] acetaminophen, albuterol, alum & mag hydroxide-simethicone, benzocaine-menthol, hydrOXYzine, nicotine, polyethylene glycol, polyethylene glycol-propylene glycol PF, risperiDONE, senna-docusate, [Held by provider] traZODone    Medication adherence issues: MS Med Adherence Y/N: Yes, Hospitalization  Medication side effects: MEDICATION SIDE EFFECTS: no side effects reported  Benefit: Yes / No: Yes       ROS   A comprehensive review of systems was negative.       MENTAL STATUS EXAM   Vitals: /76 (BP Location: Left arm, Patient Position: Supine)   Pulse 91   Temp 97.7  F (36.5  C) (Temporal)   Resp 16   Ht 1.778 m (5' 10\")   Wt 76.2 kg (168 lb 1.6 oz)   SpO2 97%   BMI 24.12 kg/m      Appearance:  No apparent distress  Mood: \"I am fine\"  Affect: Calm and pleasant  Suicidal Ideation: denies   Homicidal Ideation: denies   Thought process: concrete  Thought content: Remains confused.  Denies having auditory hallucinations at the moment of the assessment.  Fund of Knowledge: Below average  Attention/Concentration: Easily distracted  Language ability: Significantly impaired  Memory: Global memory impairment  Insight:  Poor.  Judgement: Poor  Orientation: Only to person  Psychomotor Behavior: slowed    Muscle Strength and Tone: MuscleStrength: Normal and Atrophy  Gait and Station: Not evaluated       LABS   personally reviewed.     No results found for: PHENYTOIN, PHENOBARB, VALPROATE, CBMZ       DIAGNOSIS   Principal Problem:    Major neurocognitive disorder, due to multiple etiologies, with behavioral disturbance, severe (H)    Active Problem List:  Patient Active Problem List   Diagnosis     TBI with aggressive behavior     HTN (hypertension)     COPD (chronic obstructive pulmonary disease) (H)     Dementia with behavioral disturbance (H)     Chronic schizophrenia (H)     Smoker     Agitation     Behavior disturbance     Psychosis (H)     " Major neurocognitive disorder, due to multiple etiologies, with behavioral disturbance, severe (H)     Paranoid schizophrenia, chronic condition with acute exacerbation (H)          PLAN   1. Ongoing education given regarding diagnostic and treatment options with risks, benefits and alternatives and adequate verbalization of understanding.  2.  Medications       BuSpar 30 mg 2 times daily       Clozaril 200 mg at bedtime       Aricept 10 mg at bedtime       Gabapentin 100 mg 3 times a day       Haldol 1 mg at bedtime       Namenda 10 mg 2 times a day       Remeron 15 mg at bedtime       melatonin 5 mg at bedtime  3.  Medical team following the patient as needed   4.   coordinating a safe discharge plan with the patient.    Risk Assessment: [facility] [place] RISK ASSESSMENT: Patient able to contract for safety    Coordination of Care:   Treatment Plan reviewed and physician signed, Care discussed with Care/Treatment Team Members, Chart reviewed and Patient seen      Re-Certification I certify that the inpatient psychiatric facility services furnished since the previous certification were, and continue to be, medically necessary for, either, treatment which could reasonably be expected to improve the patient s condition or diagnostic study and that the hospital records indicate that the services furnished were, either, intensive treatment services, admission and related services necessary for diagnostic study, or equivalent services.     I certify that the patient continues to need, on a daily basis, active treatment furnished directly by or requiring the supervision of inpatient psychiatric facility personnel.   I estimate 14 days of hospitalization is necessary for proper treatment of the patient. My plans for post-hospital care for this patient are  TBD     Ginger Dubon MD    -     04/11/2022  -     12:40 PM    Total time 15 minutes with > 50%spent on coordination of cares and  psycho-education.    This note was created with help of Dragon dictation system. Grammatical / typing errors are not intentional.    Ginger Dubon MD

## 2022-04-11 NOTE — PLAN OF CARE
Goal Outcome Evaluation:    Plan of Care Reviewed With: patient     Patient remains isolative and withdrawn. He came out of his room for dinner only. He ate 100%. His affect is flat. His mood is calm. He denies SI and SIB. He endorses AH hearing his mother and sisters voices. He refused to attend groups. He took a shower this morning. He is medication compliant. He denies pain.

## 2022-04-11 NOTE — PROGRESS NOTES
"Brief medicine note:     Called by nursing reporting that patient fell this AM. Per staff and patient's peer report \"Pt stood from dining room chair this AM and caught his foot on the chair and sat on the floor layed back and bumped his head on plastic garbage can.\" Upon assessment of patient, he reports he fell. Denies hitting his head. He is conversant. He declined physical exam stating, \"No, I'm fine.\" Denies any lightheadedness, dizziness, chest pain, SOB. Reports chronic numbness in bilateral feet that is unchanged. Per nursing, patient walked to his room with no difficulties after fall.     /76 (BP Location: Right arm, Patient Position: Sitting)   Pulse 105   Temp 97  F (36.1  C) (Tympanic)   Resp 16   Ht 1.778 m (5' 10\")   Wt 76.2 kg (168 lb 1.6 oz)   SpO2 99%   BMI 24.12 kg/m      Gen: Awake, alert, lying in bed   Refused remainder of exam.    # Mechanical fall   - Low suspicion for injury given mechanical falls and absence of physical symptoms.   - Monitor throughout the day, notify medicine if any changes or concerns    Currently patient is medically stable and medicine will sign off. Thank you for allowing us to be a part of this patients care. Please notify on call SHREE if any intercurrent medical issues arise.     No Red PA-C  [facility]  Securely message with the Vocera Web Console (learn more here)  Text page via [email or web address] Paging/Directory    "

## 2022-04-11 NOTE — PLAN OF CARE
Problem: Depression  Goal: Improved Mood  Outcome: Ongoing, Progressing   Goal Outcome Evaluation:                Slept well tonight for 9.5 hours, no complaints/ concerns raised this shift.  Independent with turning to sides and toileting.

## 2022-04-12 PROCEDURE — 250N000013 HC RX MED GY IP 250 OP 250 PS 637: Performed by: PSYCHIATRY & NEUROLOGY

## 2022-04-12 PROCEDURE — 124N000003 HC R&B MH SENIOR/ADOLESCENT

## 2022-04-12 RX ADMIN — MEMANTINE 10 MG: 10 TABLET ORAL at 08:59

## 2022-04-12 RX ADMIN — BUSPIRONE HYDROCHLORIDE 30 MG: 15 TABLET ORAL at 20:49

## 2022-04-12 RX ADMIN — ASPIRIN 81 MG: 81 TABLET, COATED ORAL at 08:59

## 2022-04-12 RX ADMIN — BUSPIRONE HYDROCHLORIDE 30 MG: 15 TABLET ORAL at 08:59

## 2022-04-12 RX ADMIN — LEVOTHYROXINE SODIUM 88 MCG: 0.09 TABLET ORAL at 08:59

## 2022-04-12 RX ADMIN — DONEPEZIL HYDROCHLORIDE 10 MG: 10 TABLET ORAL at 20:50

## 2022-04-12 RX ADMIN — GABAPENTIN 100 MG: 100 CAPSULE ORAL at 17:47

## 2022-04-12 RX ADMIN — MIRTAZAPINE 15 MG: 15 TABLET, FILM COATED ORAL at 20:50

## 2022-04-12 RX ADMIN — HALOPERIDOL 1 MG: 1 TABLET ORAL at 20:50

## 2022-04-12 RX ADMIN — MEMANTINE 10 MG: 10 TABLET ORAL at 20:50

## 2022-04-12 RX ADMIN — SALMETEROL XINAFOATE 1 PUFF: 50 POWDER, METERED ORAL; RESPIRATORY (INHALATION) at 09:00

## 2022-04-12 RX ADMIN — MEGESTROL ACETATE 20 MG: 20 TABLET ORAL at 08:59

## 2022-04-12 RX ADMIN — GABAPENTIN 100 MG: 100 CAPSULE ORAL at 08:59

## 2022-04-12 RX ADMIN — Medication 5 MG: at 20:55

## 2022-04-12 RX ADMIN — SALMETEROL XINAFOATE 1 PUFF: 50 POWDER, METERED ORAL; RESPIRATORY (INHALATION) at 20:55

## 2022-04-12 RX ADMIN — GABAPENTIN 100 MG: 100 CAPSULE ORAL at 12:28

## 2022-04-12 RX ADMIN — CLOZAPINE 200 MG: 100 TABLET ORAL at 20:50

## 2022-04-12 ASSESSMENT — ACTIVITIES OF DAILY LIVING (ADL)
LAUNDRY: UNABLE TO COMPLETE
DRESS: INDEPENDENT;SCRUBS (BEHAVIORAL HEALTH)
HYGIENE/GROOMING: PROMPTS
ORAL_HYGIENE: PROMPTS

## 2022-04-12 NOTE — PLAN OF CARE
"Goal Outcome Evaluation:    Pt was out to the DR a bit late, for bkfst. He returned to his rm, and is in bed. He is not conversational, but mumbled \"yeah,\" when asked if doing ok. His affect is rather flat. Pt also agreed to ask if has needs, when encouraged.to do so. He took his am meds willingly, after 2 prompts. Pt was intent on setting up his bkfst tray,  before taking his medications. He is disheveled, and hygiene appears poor. Pt refused Covid swab. Cont to monitor.        1305) Pt was out to the DR for lunch, then back to his rm. He denies needs, and declines further check in. Gait is steady; denies pain or dizziness today, after having fallen yesterday.              "

## 2022-04-12 NOTE — PLAN OF CARE
Problem: Behavior Regulation Impairment (Psychotic Signs/Symptoms)  Goal: Improved Behavioral Control (Psychotic Signs/Symptoms)  Outcome: Ongoing, Progressing   Goal Outcome Evaluation:       Slept well for 10 hours  No report of any pain /discomforts  Independent with ADL's

## 2022-04-12 NOTE — PLAN OF CARE
Assessment/Intervention/Current Symtoms and Care Coordination:  -Reviewed pt's record to get up to date with his current status and plan  -Rounded with team   -CTC checked in with patient.      Discharge Plan or Goal:  Discharge to [facility] or [facility] when guardianship have been established and CADI waiver gets activated.  LSS is working on streamlining guardianship     Barriers to Discharge:  History of aggression and elopement.  Lack of current guardian to make health decisions.   Several referrals made without acceptance      Referral Status:  None today  Legal: Comm/Yanez

## 2022-04-13 PROCEDURE — 99231 SBSQ HOSP IP/OBS SF/LOW 25: CPT | Performed by: PSYCHIATRY & NEUROLOGY

## 2022-04-13 PROCEDURE — 250N000013 HC RX MED GY IP 250 OP 250 PS 637: Performed by: PSYCHIATRY & NEUROLOGY

## 2022-04-13 PROCEDURE — 124N000003 HC R&B MH SENIOR/ADOLESCENT

## 2022-04-13 RX ADMIN — BUSPIRONE HYDROCHLORIDE 30 MG: 15 TABLET ORAL at 21:09

## 2022-04-13 RX ADMIN — MEMANTINE 10 MG: 10 TABLET ORAL at 21:09

## 2022-04-13 RX ADMIN — SALMETEROL XINAFOATE 1 PUFF: 50 POWDER, METERED ORAL; RESPIRATORY (INHALATION) at 08:35

## 2022-04-13 RX ADMIN — MEMANTINE 10 MG: 10 TABLET ORAL at 08:32

## 2022-04-13 RX ADMIN — GABAPENTIN 100 MG: 100 CAPSULE ORAL at 12:38

## 2022-04-13 RX ADMIN — MEGESTROL ACETATE 20 MG: 20 TABLET ORAL at 08:31

## 2022-04-13 RX ADMIN — ASPIRIN 81 MG: 81 TABLET, COATED ORAL at 08:31

## 2022-04-13 RX ADMIN — CLOZAPINE 200 MG: 100 TABLET ORAL at 21:09

## 2022-04-13 RX ADMIN — Medication 5 MG: at 21:09

## 2022-04-13 RX ADMIN — LEVOTHYROXINE SODIUM 88 MCG: 0.09 TABLET ORAL at 08:32

## 2022-04-13 RX ADMIN — GABAPENTIN 100 MG: 100 CAPSULE ORAL at 08:31

## 2022-04-13 RX ADMIN — BUSPIRONE HYDROCHLORIDE 30 MG: 15 TABLET ORAL at 08:32

## 2022-04-13 RX ADMIN — HALOPERIDOL 1 MG: 1 TABLET ORAL at 21:09

## 2022-04-13 RX ADMIN — MIRTAZAPINE 15 MG: 15 TABLET, FILM COATED ORAL at 21:09

## 2022-04-13 RX ADMIN — GABAPENTIN 100 MG: 100 CAPSULE ORAL at 17:18

## 2022-04-13 RX ADMIN — DONEPEZIL HYDROCHLORIDE 10 MG: 10 TABLET ORAL at 21:10

## 2022-04-13 RX ADMIN — SALMETEROL XINAFOATE 1 PUFF: 50 POWDER, METERED ORAL; RESPIRATORY (INHALATION) at 21:10

## 2022-04-13 ASSESSMENT — ACTIVITIES OF DAILY LIVING (ADL)
ORAL_HYGIENE: PROMPTS
DRESS: SCRUBS (BEHAVIORAL HEALTH);PROMPTS;WITH ASSISTANCE
HYGIENE/GROOMING: PROMPTS
LAUNDRY: UNABLE TO COMPLETE

## 2022-04-13 NOTE — PROGRESS NOTES
Patient was seen and evaluated today at bedside.  Patient remains at his baseline and there has been no medication changes.

## 2022-04-13 NOTE — PLAN OF CARE
Goal Outcome Evaluation:    Plan of Care Reviewed With: patient        Pt continues to isolate to room except for meals. Ate approx 75% of dinner. Poverty of thought and speech. One-word responses. Denies any mental health symptoms. No overy signs of psychosis. Oriented to self only. Affect flat. Poor eye contact. Hygiene poor. Gait steady. Med-compliant. Continue to encourage hydration. Continue with current treatment plan and recommendations. Continue to monitor and reassess symptoms. Monitor response to medications. Monitor progress towards treatment goals. Encourage groups and participation

## 2022-04-13 NOTE — PLAN OF CARE
Assessment/Intervention/Current Symtoms and Care Coordination:  -Reviewed pt's record to get up to date with his current status and plan  -Rounded with team   -CTC checked in with patient.      CTC emailed physician's statement in support of the guardianship petition to Risk Management per their request.   Discharge Plan or Goal:  Discharge to [facility] when guardianship have been established and CADI waiver gets activated.  LSS is working on streamlining guardianship.     Barriers to Discharge:  History of aggression and elopement.  Lack of current guardian to make health decisions.   Several referrals made without acceptance      Referral Status:  None today  Legal: Comm/Yanez

## 2022-04-13 NOTE — PLAN OF CARE
"Goal Outcome Evaluation:    Plan of Care Reviewed With: patient     Pt continues to isolate to room except for meals. Ate >75% of dinner. Poverty of thought and speech. One-word responses but polite, stating \"thank you\" after interactions. Oriented to self only. Affect flat. Poor eye contact. Hygiene adequate. Gait steady. Med-compliant. Continue to encourage hydration. Continue with current treatment plan and recommendations. Continue to monitor and reassess symptoms. Monitor response to medications. Monitor progress towards treatment goals. Encourage groups and participation         "

## 2022-04-13 NOTE — PLAN OF CARE
"Goal Outcome Evaluation:    Plan of Care Reviewed With: patient     John \"Jose\" was up ad holley, came down  to the dining lounge briefly to eat both meals then returned to his room and went.   Jose ate 50% of breakfast, 25% of lunch. Fluids were encouraged throughout the shift.     Gene denied having suicidal ideation or self harm thoughts. Pt was minimally conversant with writer.   Pt was assisted with a shower this am as his gait has been unsteady at times, today his gait appeared slow but steady.     Pt is generally disoriented to time, place, and situation.   Jose was med adherent.            "

## 2022-04-13 NOTE — PROGRESS NOTES
"PSYCHIATRY  PROGRESS NOTE     DATE OF SERVICE   04/13/2022       CHIEF COMPLAINT   \"I am ok\"       SUBJECTIVE   Nursing reports: Pt continues to isolate to room except for meals. Ate >75% of dinner. Poverty of thought and speech. One-word responses but polite, stating \"thank you\" after interactions. Oriented to self only. Affect flat. Poor eye contact. Hygiene adequate. Gait steady. Med-compliant. Continue to encourage hydration. Continue with current treatment plan and recommendations. Continue to monitor and reassess symptoms. Monitor response to medications. Monitor progress towards treatment goals. Encourage groups and participation    Patient has been discussed with the  during team meeting.  Physician statement for guardianship has been completed and provided to the .     OBJECTIVE   Patient was seen and evaluated at bedside with staff present during the assessment, this was a face-to-face evaluation.  Patient continues to present as isolative, withdrawn and responding to internal stimuli.  He usually provide minimal responses.  Patient seemed irritable and dismissive today.  I observed the patient talking to himself but the patient refused to talk about his auditory hallucinations.  At this time he continues to be psychiatrically at his baseline and no new behaviors have been reported.       MEDICATIONS   Medications:  Scheduled Meds:    aspirin  81 mg Oral Daily     [Held by provider] atorvastatin  80 mg Oral At Bedtime     busPIRone HCl  30 mg Oral BID     cloZAPine  200 mg Oral At Bedtime     donepezil  10 mg Oral At Bedtime     gabapentin  100 mg Oral TID w/meals     haloperidol  1 mg Oral At Bedtime     levothyroxine  88 mcg Oral Daily     megestrol  20 mg Oral Daily     melatonin  5 mg Oral At Bedtime     memantine  10 mg Oral BID     mirtazapine  15 mg Oral At Bedtime     salmeterol  1 puff Inhalation BID     Continuous Infusions:  PRN Meds:.[Held by provider] acetaminophen, " "albuterol, alum & mag hydroxide-simethicone, benzocaine-menthol, hydrOXYzine, nicotine, polyethylene glycol, polyethylene glycol-propylene glycol PF, risperiDONE, senna-docusate, [Held by provider] traZODone    Medication adherence issues: MS Med Adherence Y/N: Yes, Hospitalization  Medication side effects: MEDICATION SIDE EFFECTS: no side effects reported  Benefit: Yes / No: Yes       ROS   A comprehensive review of systems was negative.       MENTAL STATUS EXAM   Vitals: /87 (BP Location: Left arm, Patient Position: Sitting)   Pulse (!) 127   Temp 97.3  F (36.3  C) (Temporal)   Resp 18   Ht 1.778 m (5' 10\")   Wt 76.2 kg (168 lb 1.6 oz)   SpO2 100%   BMI 24.12 kg/m      Appearance:  No apparent distress  Mood: \"I am ok\"  Affect: Irritable and dismissive  Suicidal Ideation: denies   Homicidal Ideation: denies   Thought process: concrete  Thought content: Remains confused.  Talking to himself and responding to internal stimuli.  Fund of Knowledge: Below average  Attention/Concentration: Easily distracted  Language ability: Significantly impaired  Memory: Global memory impairment  Insight:  Poor.  Judgement: Poor  Orientation: Only to person  Psychomotor Behavior: slowed    Muscle Strength and Tone: MuscleStrength: Normal and Atrophy  Gait and Station: Not evaluated       LABS   personally reviewed.     No results found for: PHENYTOIN, PHENOBARB, VALPROATE, CBMZ       DIAGNOSIS   Principal Problem:    Major neurocognitive disorder, due to multiple etiologies, with behavioral disturbance, severe (H)    Active Problem List:  Patient Active Problem List   Diagnosis     TBI with aggressive behavior     HTN (hypertension)     COPD (chronic obstructive pulmonary disease) (H)     Dementia with behavioral disturbance (H)     Chronic schizophrenia (H)     Smoker     Agitation     Behavior disturbance     Psychosis (H)     Major neurocognitive disorder, due to multiple etiologies, with behavioral disturbance, " severe (H)     Paranoid schizophrenia, chronic condition with acute exacerbation (H)          PLAN   1. Ongoing education given regarding diagnostic and treatment options with risks, benefits and alternatives and adequate verbalization of understanding.  2.  Medications       BuSpar 30 mg 2 times daily       Clozaril 200 mg at bedtime       Aricept 10 mg at bedtime       Gabapentin 100 mg 3 times a day       Haldol 1 mg at bedtime       Namenda 10 mg 2 times a day       Remeron 15 mg at bedtime       melatonin 5 mg at bedtime  3.  Medical team following the patient as needed   4.   coordinating a safe discharge plan with the patient.    Risk Assessment: [facility] RISK ASSESSMENT: Patient able to contract for safety    Coordination of Care:   Treatment Plan reviewed and physician signed, Care discussed with Care/Treatment Team Members, Chart reviewed and Patient seen      Re-Certification I certify that the inpatient psychiatric facility services furnished since the previous certification were, and continue to be, medically necessary for, either, treatment which could reasonably be expected to improve the patient s condition or diagnostic study and that the hospital records indicate that the services furnished were, either, intensive treatment services, admission and related services necessary for diagnostic study, or equivalent services.     I certify that the patient continues to need, on a daily basis, active treatment furnished directly by or requiring the supervision of inpatient psychiatric facility personnel.   I estimate 14 days of hospitalization is necessary for proper treatment of the patient. My plans for post-hospital care for this patient are  TBD     Ginger Dubon MD    -     04/13/2022  -     11:17 AM    Total time 15 minutes with > 50%spent on coordination of cares and psycho-education.    This note was created with help of Dragon dictation system. Grammatical / typing errors are  not intentional.    Ginger Dubon MD

## 2022-04-13 NOTE — PROVIDER NOTIFICATION
04/13/22 1021   Vital Signs   Pulse (!) 127   Pulse Rate Source Monitor   Oxygen Therapy   SpO2 100 %   O2 Device None (Room air)   Pt was ambulating to the shower with staff, he was irritable, frustrated. Will recheck after shower.

## 2022-04-13 NOTE — PLAN OF CARE
Problem: Behavior Regulation Impairment (Psychotic Signs/Symptoms)  Goal: Improved Behavioral Control (Psychotic Signs/Symptoms)  Outcome: Ongoing, Progressing   Goal Outcome Evaluation:       Slept well for 9.25  hours  No behavioral issues encountered this shift.  Independent in repositioning in bed.

## 2022-04-13 NOTE — PLAN OF CARE
Assessment/Intervention/Current Symtoms and Care Coordination:  -Reviewed pt's record to get up to date with his current status and plan  -Rounded with team   -CTC checked in with patient.   Discharge Plan or Goal:  Discharge to [facility] when guardianship have been established and CADI waiver gets activated.  LSS is working on streamlining guardianship     Barriers to Discharge:  History of aggression and elopement.  Lack of current guardian to make health decisions.   Several referrals made without acceptance      Referral Status:  None today  Legal: Comm/Yanez

## 2022-04-14 PROCEDURE — 250N000013 HC RX MED GY IP 250 OP 250 PS 637: Performed by: PSYCHIATRY & NEUROLOGY

## 2022-04-14 PROCEDURE — 124N000003 HC R&B MH SENIOR/ADOLESCENT

## 2022-04-14 PROCEDURE — 99231 SBSQ HOSP IP/OBS SF/LOW 25: CPT | Performed by: PSYCHIATRY & NEUROLOGY

## 2022-04-14 RX ADMIN — HALOPERIDOL 1 MG: 1 TABLET ORAL at 20:27

## 2022-04-14 RX ADMIN — LEVOTHYROXINE SODIUM 88 MCG: 0.09 TABLET ORAL at 08:54

## 2022-04-14 RX ADMIN — SALMETEROL XINAFOATE 1 PUFF: 50 POWDER, METERED ORAL; RESPIRATORY (INHALATION) at 20:27

## 2022-04-14 RX ADMIN — CLOZAPINE 200 MG: 100 TABLET ORAL at 20:27

## 2022-04-14 RX ADMIN — GABAPENTIN 100 MG: 100 CAPSULE ORAL at 08:55

## 2022-04-14 RX ADMIN — SALMETEROL XINAFOATE 1 PUFF: 50 POWDER, METERED ORAL; RESPIRATORY (INHALATION) at 08:55

## 2022-04-14 RX ADMIN — Medication 5 MG: at 20:27

## 2022-04-14 RX ADMIN — BUSPIRONE HYDROCHLORIDE 30 MG: 15 TABLET ORAL at 20:27

## 2022-04-14 RX ADMIN — MEGESTROL ACETATE 20 MG: 20 TABLET ORAL at 08:55

## 2022-04-14 RX ADMIN — ASPIRIN 81 MG: 81 TABLET, COATED ORAL at 08:54

## 2022-04-14 RX ADMIN — BUSPIRONE HYDROCHLORIDE 30 MG: 15 TABLET ORAL at 08:54

## 2022-04-14 RX ADMIN — DONEPEZIL HYDROCHLORIDE 10 MG: 10 TABLET ORAL at 20:27

## 2022-04-14 RX ADMIN — MIRTAZAPINE 15 MG: 15 TABLET, FILM COATED ORAL at 20:27

## 2022-04-14 RX ADMIN — MEMANTINE 10 MG: 10 TABLET ORAL at 08:55

## 2022-04-14 RX ADMIN — GABAPENTIN 100 MG: 100 CAPSULE ORAL at 17:12

## 2022-04-14 RX ADMIN — GABAPENTIN 100 MG: 100 CAPSULE ORAL at 12:37

## 2022-04-14 RX ADMIN — MEMANTINE 10 MG: 10 TABLET ORAL at 20:27

## 2022-04-14 ASSESSMENT — ACTIVITIES OF DAILY LIVING (ADL)
DRESS: SCRUBS (BEHAVIORAL HEALTH);INDEPENDENT
HYGIENE/GROOMING: INDEPENDENT;PROMPTS
ORAL_HYGIENE: PROMPTS;INDEPENDENT
HYGIENE/GROOMING: PROMPTS
LAUNDRY: UNABLE TO COMPLETE
ORAL_HYGIENE: PROMPTS

## 2022-04-14 NOTE — PLAN OF CARE
Problem: Mood Impairment (Psychotic Signs/Symptoms)  Goal: Improved Mood Symptoms (Psychotic Signs/Symptoms)  Outcome: Ongoing, Progressing   Goal Outcome Evaluation:           Slept well for 11 hours  No behavioral issues encountered tonight  Independent with turning and ADL.

## 2022-04-14 NOTE — PLAN OF CARE
Problem: Cognitive Impairment (Psychotic Signs/Symptoms)  Goal: Optimal Cognitive Function (Psychotic Signs/Symptoms)  Outcome: Ongoing, Not Progressing     Problem: Behavior Regulation Impairment (Psychotic Signs/Symptoms)  Goal: Improved Behavioral Control (Psychotic Signs/Symptoms)  Outcome: Ongoing, Progressing   Goal Outcome Evaluation:    Plan of Care Reviewed With: patient     Patient is calm and cooperative, medication compliant. Denies SI, SIB, Anxiety and depression. Endorses auditory hallucinations, reports hearing mothers voice. Not social with peers, does not attend group. Visibile in the milieu for meals only. Refused assistance with ADLs.

## 2022-04-14 NOTE — PROGRESS NOTES
"PSYCHIATRY  PROGRESS NOTE     DATE OF SERVICE   04/14/2022       CHIEF COMPLAINT   \"I want some fucking orange juice.\"       SUBJECTIVE   Nursing reports: Plan of Care Reviewed With: patient      Pt continues to isolate to room except for meals. Ate approx 75% of dinner. Poverty of thought and speech. One-word responses. Oriented to self only. Affect flat. Poor eye contact. Hygiene poor. Gait steady. Med-compliant. Pulse remains elevated: 112. Continue to encourage hydration. Continue with current treatment plan and recommendations. Continue to monitor and reassess symptoms. Monitor response to medications. Monitor progress towards treatment goals. Encourage groups and participation.    Patient has been discussed with the  during team meeting.  There has been no updates about the guardianship or the discharge plans.     OBJECTIVE   Patient was seen and evaluated at bedside with staff present during the assessment, this was a face-to-face evaluation.  The patient presented as irritable and dismissive.  He denied all psychiatric symptoms but I can observe that he has continued to respond to internal stimuli.  Earlier he reported to the nurses that he was feeling dizzy but he has been not fully cooperative with the orthostatic blood pressures.  I will order some blood work for tomorrow morning.  Patient has been eating and drinking adequately.       MEDICATIONS   Medications:  Scheduled Meds:    aspirin  81 mg Oral Daily     [Held by provider] atorvastatin  80 mg Oral At Bedtime     busPIRone HCl  30 mg Oral BID     cloZAPine  200 mg Oral At Bedtime     donepezil  10 mg Oral At Bedtime     gabapentin  100 mg Oral TID w/meals     haloperidol  1 mg Oral At Bedtime     levothyroxine  88 mcg Oral Daily     megestrol  20 mg Oral Daily     melatonin  5 mg Oral At Bedtime     memantine  10 mg Oral BID     mirtazapine  15 mg Oral At Bedtime     salmeterol  1 puff Inhalation BID     Continuous Infusions:  PRN " "Meds:.[Held by provider] acetaminophen, albuterol, alum & mag hydroxide-simethicone, benzocaine-menthol, hydrOXYzine, nicotine, polyethylene glycol, polyethylene glycol-propylene glycol PF, risperiDONE, senna-docusate, [Held by provider] traZODone    Medication adherence issues: MS Med Adherence Y/N: Yes, Hospitalization  Medication side effects: MEDICATION SIDE EFFECTS: no side effects reported  Benefit: Yes / No: Yes       ROS   A comprehensive review of systems was negative.       MENTAL STATUS EXAM   Vitals: /73 (Patient Position: Sitting)   Pulse 98   Temp 98.1  F (36.7  C) (Oral)   Resp 16   Ht 1.778 m (5' 10\")   Wt 77 kg (169 lb 11.2 oz)   SpO2 99%   BMI 24.35 kg/m      Same as last visit  Appearance:  No apparent distress  Mood: \"I am ok\"  Affect: Irritable and dismissive  Suicidal Ideation: denies   Homicidal Ideation: denies   Thought process: concrete  Thought content: Remains confused.  Talking to himself and responding to internal stimuli.  Fund of Knowledge: Below average  Attention/Concentration: Easily distracted  Language ability: Significantly impaired  Memory: Global memory impairment  Insight:  Poor.  Judgement: Poor  Orientation: Only to person  Psychomotor Behavior: slowed    Muscle Strength and Tone: MuscleStrength: Normal and Atrophy  Gait and Station: Not evaluated       LABS   personally reviewed.     No results found for: PHENYTOIN, PHENOBARB, VALPROATE, CBMZ       DIAGNOSIS   Principal Problem:    Major neurocognitive disorder, due to multiple etiologies, with behavioral disturbance, severe (H)    Active Problem List:  Patient Active Problem List   Diagnosis     TBI with aggressive behavior     HTN (hypertension)     COPD (chronic obstructive pulmonary disease) (H)     Dementia with behavioral disturbance (H)     Chronic schizophrenia (H)     Smoker     Agitation     Behavior disturbance     Psychosis (H)     Major neurocognitive disorder, due to multiple etiologies, with " behavioral disturbance, severe (H)     Paranoid schizophrenia, chronic condition with acute exacerbation (H)          PLAN   1. Ongoing education given regarding diagnostic and treatment options with risks, benefits and alternatives and adequate verbalization of understanding.  2.  Medications       BuSpar 30 mg 2 times daily       Clozaril 200 mg at bedtime       Aricept 10 mg at bedtime       Gabapentin 100 mg 3 times a day       Haldol 1 mg at bedtime       Namenda 10 mg 2 times a day       Remeron 15 mg at bedtime       melatonin 5 mg at bedtime  3.  Medical team following the patient as needed   4.   coordinating a safe discharge plan with the patient.    Risk Assessment: [facility] RISK ASSESSMENT: Patient able to contract for safety    Coordination of Care:   Treatment Plan reviewed and physician signed, Care discussed with Care/Treatment Team Members, Chart reviewed and Patient seen      Re-Certification I certify that the inpatient psychiatric facility services furnished since the previous certification were, and continue to be, medically necessary for, either, treatment which could reasonably be expected to improve the patient s condition or diagnostic study and that the hospital records indicate that the services furnished were, either, intensive treatment services, admission and related services necessary for diagnostic study, or equivalent services.     I certify that the patient continues to need, on a daily basis, active treatment furnished directly by or requiring the supervision of inpatient psychiatric facility personnel.   I estimate 14 days of hospitalization is necessary for proper treatment of the patient. My plans for post-hospital care for this patient are  TBD     Ginger Dubon MD    -     04/14/2022  -     5:57 PM    Total time 15 minutes with > 50%spent on coordination of cares and psycho-education.    This note was created with help of Dragon dictation system.  Grammatical / typing errors are not intentional.    Ginger Dubon MD

## 2022-04-15 LAB
ALBUMIN SERPL-MCNC: 3.2 G/DL (ref 3.4–5)
ALP SERPL-CCNC: 76 U/L (ref 40–150)
ALT SERPL W P-5'-P-CCNC: 91 U/L (ref 0–70)
ANION GAP SERPL CALCULATED.3IONS-SCNC: 8 MMOL/L (ref 3–14)
AST SERPL W P-5'-P-CCNC: 122 U/L (ref 0–45)
BASOPHILS # BLD AUTO: 0.1 10E3/UL (ref 0–0.2)
BASOPHILS NFR BLD AUTO: 1 %
BILIRUB SERPL-MCNC: 0.3 MG/DL (ref 0.2–1.3)
BUN SERPL-MCNC: 20 MG/DL (ref 7–30)
CALCIUM SERPL-MCNC: 9.2 MG/DL (ref 8.5–10.1)
CHLORIDE BLD-SCNC: 116 MMOL/L (ref 94–109)
CO2 SERPL-SCNC: 19 MMOL/L (ref 20–32)
CREAT SERPL-MCNC: 0.69 MG/DL (ref 0.66–1.25)
EOSINOPHIL # BLD AUTO: 1.3 10E3/UL (ref 0–0.7)
EOSINOPHIL NFR BLD AUTO: 20 %
ERYTHROCYTE [DISTWIDTH] IN BLOOD BY AUTOMATED COUNT: 13.8 % (ref 10–15)
GFR SERPL CREATININE-BSD FRML MDRD: >90 ML/MIN/1.73M2
GLUCOSE BLD-MCNC: 93 MG/DL (ref 70–99)
HCT VFR BLD AUTO: 40.9 % (ref 40–53)
HGB BLD-MCNC: 14.1 G/DL (ref 13.3–17.7)
IMM GRANULOCYTES # BLD: 0 10E3/UL
IMM GRANULOCYTES NFR BLD: 0 %
LYMPHOCYTES # BLD AUTO: 1.6 10E3/UL (ref 0.8–5.3)
LYMPHOCYTES NFR BLD AUTO: 24 %
MCH RBC QN AUTO: 31.5 PG (ref 26.5–33)
MCHC RBC AUTO-ENTMCNC: 34.5 G/DL (ref 31.5–36.5)
MCV RBC AUTO: 92 FL (ref 78–100)
MONOCYTES # BLD AUTO: 0.6 10E3/UL (ref 0–1.3)
MONOCYTES NFR BLD AUTO: 9 %
NEUTROPHILS # BLD AUTO: 3 10E3/UL (ref 1.6–8.3)
NEUTROPHILS NFR BLD AUTO: 46 %
NRBC # BLD AUTO: 0 10E3/UL
NRBC BLD AUTO-RTO: 0 /100
PLATELET # BLD AUTO: 172 10E3/UL (ref 150–450)
POTASSIUM BLD-SCNC: 4 MMOL/L (ref 3.4–5.3)
PROT SERPL-MCNC: 7.2 G/DL (ref 6.8–8.8)
RBC # BLD AUTO: 4.47 10E6/UL (ref 4.4–5.9)
SODIUM SERPL-SCNC: 143 MMOL/L (ref 133–144)
TSH SERPL DL<=0.005 MIU/L-ACNC: 3.4 MU/L (ref 0.4–4)
WBC # BLD AUTO: 6.6 10E3/UL (ref 4–11)

## 2022-04-15 PROCEDURE — 80053 COMPREHEN METABOLIC PANEL: CPT | Performed by: PSYCHIATRY & NEUROLOGY

## 2022-04-15 PROCEDURE — 85025 COMPLETE CBC W/AUTO DIFF WBC: CPT | Performed by: PSYCHIATRY & NEUROLOGY

## 2022-04-15 PROCEDURE — 124N000003 HC R&B MH SENIOR/ADOLESCENT

## 2022-04-15 PROCEDURE — 250N000013 HC RX MED GY IP 250 OP 250 PS 637: Performed by: PSYCHIATRY & NEUROLOGY

## 2022-04-15 PROCEDURE — 99231 SBSQ HOSP IP/OBS SF/LOW 25: CPT | Performed by: PSYCHIATRY & NEUROLOGY

## 2022-04-15 PROCEDURE — 84443 ASSAY THYROID STIM HORMONE: CPT | Performed by: PSYCHIATRY & NEUROLOGY

## 2022-04-15 PROCEDURE — 36415 COLL VENOUS BLD VENIPUNCTURE: CPT | Performed by: PSYCHIATRY & NEUROLOGY

## 2022-04-15 RX ADMIN — SALMETEROL XINAFOATE 1 PUFF: 50 POWDER, METERED ORAL; RESPIRATORY (INHALATION) at 08:33

## 2022-04-15 RX ADMIN — GABAPENTIN 100 MG: 100 CAPSULE ORAL at 17:28

## 2022-04-15 RX ADMIN — GABAPENTIN 100 MG: 100 CAPSULE ORAL at 11:43

## 2022-04-15 RX ADMIN — BUSPIRONE HYDROCHLORIDE 30 MG: 15 TABLET ORAL at 08:30

## 2022-04-15 RX ADMIN — ASPIRIN 81 MG: 81 TABLET, COATED ORAL at 08:29

## 2022-04-15 RX ADMIN — GABAPENTIN 100 MG: 100 CAPSULE ORAL at 08:29

## 2022-04-15 RX ADMIN — SALMETEROL XINAFOATE 1 PUFF: 50 POWDER, METERED ORAL; RESPIRATORY (INHALATION) at 20:06

## 2022-04-15 RX ADMIN — MEMANTINE 10 MG: 10 TABLET ORAL at 08:29

## 2022-04-15 RX ADMIN — Medication 5 MG: at 20:06

## 2022-04-15 RX ADMIN — DONEPEZIL HYDROCHLORIDE 10 MG: 10 TABLET ORAL at 20:06

## 2022-04-15 RX ADMIN — MIRTAZAPINE 15 MG: 15 TABLET, FILM COATED ORAL at 20:06

## 2022-04-15 RX ADMIN — MEMANTINE 10 MG: 10 TABLET ORAL at 20:06

## 2022-04-15 RX ADMIN — MEGESTROL ACETATE 20 MG: 20 TABLET ORAL at 08:29

## 2022-04-15 RX ADMIN — LEVOTHYROXINE SODIUM 88 MCG: 0.09 TABLET ORAL at 08:29

## 2022-04-15 RX ADMIN — BUSPIRONE HYDROCHLORIDE 30 MG: 15 TABLET ORAL at 20:06

## 2022-04-15 RX ADMIN — CLOZAPINE 200 MG: 100 TABLET ORAL at 20:06

## 2022-04-15 RX ADMIN — HALOPERIDOL 1 MG: 1 TABLET ORAL at 20:06

## 2022-04-15 ASSESSMENT — ACTIVITIES OF DAILY LIVING (ADL)
LAUNDRY: WITH SUPERVISION
DRESS: SCRUBS (BEHAVIORAL HEALTH);INDEPENDENT
HYGIENE/GROOMING: HANDWASHING;INDEPENDENT
ORAL_HYGIENE: PROMPTS

## 2022-04-15 NOTE — PLAN OF CARE
Problem: Sleep Disturbance (Psychotic Signs/Symptoms)  Goal: Improved Sleep (Psychotic Signs/Symptoms)  Outcome: Ongoing, Progressing   Goal Outcome Evaluation:        Slept for 10.5 hours tonight  Independently switches positions in bed.  Scheduled for labs today, TSH,CMP and CBC w/ Platelets.  No concerns raised.

## 2022-04-15 NOTE — PLAN OF CARE
"Goal Outcome Evaluation:    Plan of Care Reviewed With: patient     Patient has been isolative and withdrawn. He did not attend groups. He came to the dining room for dinner only. He ate 100% of his meal. He was encouraged to drink fluids. His affect is flat. His mood is calm. He denies AH. He denies depression. He endorses anxiety from \"being here for so long\" and stated \"I just want to get the fuck out of here\". He is alert to self only. He is medication compliant.               "

## 2022-04-15 NOTE — PROGRESS NOTES
"PSYCHIATRY  PROGRESS NOTE     DATE OF SERVICE   04/15/2022       CHIEF COMPLAINT   \"I am tired.\"       SUBJECTIVE   Nursing reports: Plan of Care Reviewed With: patient      Patient has been isolative and withdrawn. He did not attend groups. He came to the dining room for dinner only. He ate 100% of his meal. He was encouraged to drink fluids. His affect is flat. His mood is calm. He denies AH. He denies depression. He endorses anxiety from \"being here for so long\" and stated \"I just want to get the fuck out of here\". He is alert to self only. He is medication compliant.     Patient has been discussed with the  during team meeting.  There has been no updates about the guardianship or the discharge plans.  Physician statement has been completed and provided to the .     OBJECTIVE   Patient was seen and evaluated at bedside with staff present during the assessment, this was a face-to-face evaluation.  The patient reported today that he is feeling tired but he did not elaborated on this.  Admitted that he has continued to have auditory hallucinations of his mother and sister that are not present in the unit.  He is denying having any thoughts of harming himself and has continued to contract for safety.  Continues to present as irritable and dismissive at times.  Remains confused.       MEDICATIONS   Medications:  Scheduled Meds:    aspirin  81 mg Oral Daily     [Held by provider] atorvastatin  80 mg Oral At Bedtime     busPIRone HCl  30 mg Oral BID     cloZAPine  200 mg Oral At Bedtime     donepezil  10 mg Oral At Bedtime     gabapentin  100 mg Oral TID w/meals     haloperidol  1 mg Oral At Bedtime     levothyroxine  88 mcg Oral Daily     megestrol  20 mg Oral Daily     melatonin  5 mg Oral At Bedtime     memantine  10 mg Oral BID     mirtazapine  15 mg Oral At Bedtime     salmeterol  1 puff Inhalation BID     Continuous Infusions:  PRN Meds:.[Held by provider] acetaminophen, albuterol, alum & " "mag hydroxide-simethicone, benzocaine-menthol, hydrOXYzine, nicotine, polyethylene glycol, polyethylene glycol-propylene glycol PF, risperiDONE, senna-docusate, [Held by provider] traZODone    Medication adherence issues: MS Med Adherence Y/N: Yes, Hospitalization  Medication side effects: MEDICATION SIDE EFFECTS: no side effects reported  Benefit: Yes / No: Yes       ROS   A comprehensive review of systems was negative.       MENTAL STATUS EXAM   Vitals: /74   Pulse 101   Temp 98.2  F (36.8  C) (Temporal)   Resp 16   Ht 1.778 m (5' 10\")   Wt 77 kg (169 lb 11.2 oz)   SpO2 94%   BMI 24.35 kg/m        Appearance:  No apparent distress  Mood: \"I am tired\"  Affect: Irritable and dismissive  Suicidal Ideation: denies   Homicidal Ideation: denies   Thought process: concrete  Thought content: Remains confused.  Talking to himself and responding to internal stimuli. Having AH.  Fund of Knowledge: Below average  Attention/Concentration: Easily distracted  Language ability: Significantly impaired  Memory: Global memory impairment  Insight:  Poor.  Judgement: Poor  Orientation: Only to person  Psychomotor Behavior: slowed    Muscle Strength and Tone: MuscleStrength: Normal and Atrophy  Gait and Station: Not evaluated       LABS   personally reviewed.     No results found for: PHENYTOIN, PHENOBARB, VALPROATE, CBMZ       DIAGNOSIS   Principal Problem:    Major neurocognitive disorder, due to multiple etiologies, with behavioral disturbance, severe (H)    Active Problem List:  Patient Active Problem List   Diagnosis     TBI with aggressive behavior     HTN (hypertension)     COPD (chronic obstructive pulmonary disease) (H)     Dementia with behavioral disturbance (H)     Chronic schizophrenia (H)     Smoker     Agitation     Behavior disturbance     Psychosis (H)     Major neurocognitive disorder, due to multiple etiologies, with behavioral disturbance, severe (H)     Paranoid schizophrenia, chronic condition with " acute exacerbation (H)          PLAN   1. Ongoing education given regarding diagnostic and treatment options with risks, benefits and alternatives and adequate verbalization of understanding.  2.  Medications       BuSpar 30 mg 2 times daily       Clozaril 200 mg at bedtime       Aricept 10 mg at bedtime       Gabapentin 100 mg 3 times a day       Haldol 1 mg at bedtime       Namenda 10 mg 2 times a day       Remeron 15 mg at bedtime       melatonin 5 mg at bedtime  3.  Medical team following the patient as needed   4.   coordinating a safe discharge plan with the patient.    Risk Assessment: [facility] RISK ASSESSMENT: Patient able to contract for safety    Coordination of Care:   Treatment Plan reviewed and physician signed, Care discussed with Care/Treatment Team Members, Chart reviewed and Patient seen      Re-Certification I certify that the inpatient psychiatric facility services furnished since the previous certification were, and continue to be, medically necessary for, either, treatment which could reasonably be expected to improve the patient s condition or diagnostic study and that the hospital records indicate that the services furnished were, either, intensive treatment services, admission and related services necessary for diagnostic study, or equivalent services.     I certify that the patient continues to need, on a daily basis, active treatment furnished directly by or requiring the supervision of inpatient psychiatric facility personnel.   I estimate 14 days of hospitalization is necessary for proper treatment of the patient. My plans for post-hospital care for this patient are  TBD     Ginger Dubon MD    -     04/15/2022  -     2:58 PM    Total time 15 minutes with > 50%spent on coordination of cares and psycho-education.    This note was created with help of Dragon dictation system. Grammatical / typing errors are not intentional.    Ginger Dubon MD

## 2022-04-15 NOTE — PLAN OF CARE
"Goal Outcome Evaluation:    Pt's affect is rather flat; He is calm and his mood is \"depressed.\" He rates his depression 7/10. Pt denies other MH sx. He was out to DR for bkfst, then back to rm to bed. He refused covid swab. Pt denies needs, and somatic concerns.    1321) Pt is back in bed, after lunch. He was polite, soft spoken, and said \"thank you,\" when asked if he needed anything. He allowed lab work this morning.                    "

## 2022-04-15 NOTE — PLAN OF CARE
Goal Outcome Evaluation:    Plan of Care Reviewed With: patient     Pt continues to isolate to room except for meals. Ate approx 75% of dinner. Poverty of thought and speech. One-word responses. Oriented to self only. Affect flat. Poor eye contact. Hygiene poor. Gait steady. Med-compliant. Continue to encourage hydration. Continue with current treatment plan and recommendations. Continue to monitor and reassess symptoms. Monitor response to medications. Monitor progress towards treatment goals. Encourage groups and participation

## 2022-04-16 PROCEDURE — 124N000003 HC R&B MH SENIOR/ADOLESCENT

## 2022-04-16 PROCEDURE — 250N000013 HC RX MED GY IP 250 OP 250 PS 637: Performed by: PSYCHIATRY & NEUROLOGY

## 2022-04-16 RX ADMIN — GABAPENTIN 100 MG: 100 CAPSULE ORAL at 08:22

## 2022-04-16 RX ADMIN — SALMETEROL XINAFOATE 1 PUFF: 50 POWDER, METERED ORAL; RESPIRATORY (INHALATION) at 20:12

## 2022-04-16 RX ADMIN — BUSPIRONE HYDROCHLORIDE 30 MG: 15 TABLET ORAL at 08:22

## 2022-04-16 RX ADMIN — SALMETEROL XINAFOATE 1 PUFF: 50 POWDER, METERED ORAL; RESPIRATORY (INHALATION) at 08:22

## 2022-04-16 RX ADMIN — MEGESTROL ACETATE 20 MG: 20 TABLET ORAL at 08:22

## 2022-04-16 RX ADMIN — ASPIRIN 81 MG: 81 TABLET, COATED ORAL at 08:22

## 2022-04-16 RX ADMIN — CLOZAPINE 200 MG: 100 TABLET ORAL at 20:12

## 2022-04-16 RX ADMIN — HALOPERIDOL 1 MG: 1 TABLET ORAL at 20:12

## 2022-04-16 RX ADMIN — GABAPENTIN 100 MG: 100 CAPSULE ORAL at 12:40

## 2022-04-16 RX ADMIN — MEMANTINE 10 MG: 10 TABLET ORAL at 08:22

## 2022-04-16 RX ADMIN — Medication 5 MG: at 20:12

## 2022-04-16 RX ADMIN — MEMANTINE 10 MG: 10 TABLET ORAL at 20:12

## 2022-04-16 RX ADMIN — LEVOTHYROXINE SODIUM 88 MCG: 0.09 TABLET ORAL at 08:22

## 2022-04-16 RX ADMIN — DONEPEZIL HYDROCHLORIDE 10 MG: 10 TABLET ORAL at 20:12

## 2022-04-16 RX ADMIN — GABAPENTIN 100 MG: 100 CAPSULE ORAL at 17:16

## 2022-04-16 RX ADMIN — BUSPIRONE HYDROCHLORIDE 30 MG: 15 TABLET ORAL at 20:12

## 2022-04-16 RX ADMIN — MIRTAZAPINE 15 MG: 15 TABLET, FILM COATED ORAL at 20:12

## 2022-04-16 ASSESSMENT — ACTIVITIES OF DAILY LIVING (ADL)
ORAL_HYGIENE: INDEPENDENT;PROMPTS
DRESS: INDEPENDENT;PROMPTS
HYGIENE/GROOMING: INDEPENDENT;PROMPTS
ORAL_HYGIENE: INDEPENDENT;PROMPTS
DRESS: INDEPENDENT;PROMPTS
HYGIENE/GROOMING: INDEPENDENT;PROMPTS

## 2022-04-16 NOTE — PLAN OF CARE
Assessment/Intervention/Current Symtoms and Care Coordination:  -Reviewed pt's record to get up to date with his current status and plan  -Rounded with team   -CTC checked in with patient.      CTC emailed physician's statement in support of the guardianship petition to Risk Management per their request.   Discharge Plan or Goal:  Discharge to [facility] when guardianship have been established and CADI waiver gets activated.  [facility] is working on streamlining guardianship.     Barriers to Discharge:  History of aggression and elopement.  Lack of current guardian to make health decisions.   Several referrals made without acceptance      Referral Status:  None today  Legal: Comm/[employer]

## 2022-04-16 NOTE — PLAN OF CARE
Problem: Sleep Disturbance (Psychotic Signs/Symptoms)  Goal: Improved Sleep (Psychotic Signs/Symptoms)  Outcome: Ongoing, Progressing   Goal Outcome Evaluation:             Slept well for 8 hours  Voiced no concerns  Independent w/ toileting.

## 2022-04-16 NOTE — PLAN OF CARE
"  Problem: Behavior Regulation Impairment (Psychotic Signs/Symptoms)  Goal: Improved Behavioral Control (Psychotic Signs/Symptoms)  Outcome: Ongoing, Progressing   Goal Outcome Evaluation:     Plan of Care Reviewed With: patient              Nursing Assessment    Psychosis (H) [F29]    Admit Date: 1/26/2022    Length of Stay: 80    Patient evaluation continues. Assessed mood,anxiety,thoughts and behavior. Patient is  progressing towards goals. Patient is encouraged to participate in groups and assisted to develop healthy coping skills.  Patient denies  auditory or visual hallucinations this shift./61 (BP Location: Left arm, Patient Position: Supine, Cuff Size: Adult Regular)   Pulse 103   Temp 97.6  F (36.4  C) (Temporal)   Resp 18   Ht 1.778 m (5' 10\")   Wt 77 kg (169 lb 11.2 oz)   SpO2 98%   BMI 24.35 kg/m      Mood: ok    Patient reports depression none and reports anxiety none    Affect:flat and blunted even during conversation    Sleep: good, sleeps all night  and naps during the day    Appetite: good    SI: denies    HI: denies    SIB: denies      Medication Compliance : medication complaint    Group participation:no    ADL's: assist and prompt, refuses to shower much of the time.    Fall risk interventions: proper foot wear, orthostatic B/P    Rinku Score Interventions:none    Discharge planning in process    Refer to daily team meeting notes for individualized plan of care. Nursing will continue to assess.    *Scale is 1-10 and 10 is the worst.         "

## 2022-04-16 NOTE — PLAN OF CARE
Assessment/Intervention/Current Symtoms and Care Coordination:  -Reviewed pt's record to get up to date with his current status and plan  -Rounded with team   -CTC checked in with patient.      CTC emailed physician's statement in support of the guardianship petition to Risk Management per their request.   Discharge Plan or Goal:  Discharge to [facility] when guardianship have been established and CADI waiver gets activated.  [facility] is working on streamlining guardianship.     Barriers to Discharge:  History of aggression and elopement.  Lack of current guardian to make health decisions.   Several referrals made without acceptance      Referral Status:  None today  Legal: Comm/[employer]z

## 2022-04-17 PROCEDURE — 124N000003 HC R&B MH SENIOR/ADOLESCENT

## 2022-04-17 PROCEDURE — 250N000013 HC RX MED GY IP 250 OP 250 PS 637: Performed by: PSYCHIATRY & NEUROLOGY

## 2022-04-17 RX ADMIN — ASPIRIN 81 MG: 81 TABLET, COATED ORAL at 08:02

## 2022-04-17 RX ADMIN — GABAPENTIN 100 MG: 100 CAPSULE ORAL at 17:25

## 2022-04-17 RX ADMIN — SALMETEROL XINAFOATE 1 PUFF: 50 POWDER, METERED ORAL; RESPIRATORY (INHALATION) at 20:20

## 2022-04-17 RX ADMIN — DONEPEZIL HYDROCHLORIDE 10 MG: 10 TABLET ORAL at 20:20

## 2022-04-17 RX ADMIN — MEMANTINE 10 MG: 10 TABLET ORAL at 08:03

## 2022-04-17 RX ADMIN — LEVOTHYROXINE SODIUM 88 MCG: 0.09 TABLET ORAL at 08:03

## 2022-04-17 RX ADMIN — Medication 5 MG: at 20:20

## 2022-04-17 RX ADMIN — MEMANTINE 10 MG: 10 TABLET ORAL at 20:20

## 2022-04-17 RX ADMIN — SALMETEROL XINAFOATE 1 PUFF: 50 POWDER, METERED ORAL; RESPIRATORY (INHALATION) at 08:03

## 2022-04-17 RX ADMIN — GABAPENTIN 100 MG: 100 CAPSULE ORAL at 08:03

## 2022-04-17 RX ADMIN — GABAPENTIN 100 MG: 100 CAPSULE ORAL at 11:52

## 2022-04-17 RX ADMIN — MIRTAZAPINE 15 MG: 15 TABLET, FILM COATED ORAL at 20:20

## 2022-04-17 RX ADMIN — HALOPERIDOL 1 MG: 1 TABLET ORAL at 20:20

## 2022-04-17 RX ADMIN — BUSPIRONE HYDROCHLORIDE 30 MG: 15 TABLET ORAL at 20:20

## 2022-04-17 RX ADMIN — CLOZAPINE 200 MG: 100 TABLET ORAL at 20:20

## 2022-04-17 RX ADMIN — MEGESTROL ACETATE 20 MG: 20 TABLET ORAL at 08:02

## 2022-04-17 RX ADMIN — BUSPIRONE HYDROCHLORIDE 30 MG: 15 TABLET ORAL at 08:03

## 2022-04-17 ASSESSMENT — ACTIVITIES OF DAILY LIVING (ADL)
DRESS: PROMPTS
DRESS: WITH ASSISTANCE;PROMPTS
ORAL_HYGIENE: WITH ASSISTANCE;PROMPTS
ORAL_HYGIENE: PROMPTS
HYGIENE/GROOMING: PROMPTS;WITH ASSISTANCE
HYGIENE/GROOMING: PROMPTS;WITH ASSISTANCE

## 2022-04-17 NOTE — PLAN OF CARE
"  Problem: Behavior Regulation Impairment (Psychotic Signs/Symptoms)  Goal: Improved Behavioral Control (Psychotic Signs/Symptoms)  Outcome: Met   Goal Outcome Evaluation:                    Nursing Assessment    Psychosis (H) [F29]    Admit Date: 1/26/2022    Length of Stay: 81    Patient evaluation continues. Assessed mood,anxiety,thoughts and behavior. Patient is progressing towards goals. Patient is encouraged to participate in groups and assisted to develop healthy coping skills.  Patient reports auditory hallucinations hearing sister. /86 (Patient Position: Sitting)   Pulse 106   Temp 98.1  F (36.7  C) (Oral)   Resp 18   Ht 1.778 m (5' 10\")   Wt 77 kg (169 lb 11.2 oz)   SpO2 98%   BMI 24.35 kg/m      Mood: ok    Patient reports depression none and reports anxiety none    Affect:flat blunted    Sleep: 10 hour last night    Appetite: good,75%    SI: denies    HI: denies    SIB: denies      Medication Compliance medication compliant    Group participation:no    ADL's: Assist and encourage    Fall risk interventions: proper foot wear, orthostatic B/P    Rinku Score Interventions: none    Discharge planning in process    Refer to daily team meeting notes for individualized plan of care. Nursing will continue to assess.    *Scale is 1-10 and 10 is the worst.         "

## 2022-04-17 NOTE — PLAN OF CARE
Goal Outcome Evaluation:    Plan of Care Reviewed With: patient        Pt continues to isolate to room except for meals. Ate approx 75% of dinner. Poverty of thought and speech. One-word responses. Oriented to self only. Affect flat. Poor eye contact. Hygiene poor. Gait steady. Med-compliant. Continue to encourage hydration. Continue with current treatment plan and recommendations. Continue to monitor and reassess symptoms. Monitor response to medications. Monitor progress towards treatment goals. Encourage groups and participation.

## 2022-04-17 NOTE — PLAN OF CARE
Goal Outcome Evaluation:    Plan of Care Reviewed With: patient        Pt continues to isolate to room except for meals. Ate approx 75% of dinner. Poverty of thought and speech. One-word responses. Oriented to self only. Affect flat. Poor eye contact. Hygiene poor. Gait steady. Med-compliant. Continue to encourage hydration. Continue with current treatment plan and recommendations. Pulse 110. Continue to monitor and reassess symptoms. Monitor response to medications. Monitor progress towards treatment goals. Encourage groups and participation.

## 2022-04-18 PROCEDURE — 250N000013 HC RX MED GY IP 250 OP 250 PS 637: Performed by: PSYCHIATRY & NEUROLOGY

## 2022-04-18 PROCEDURE — 124N000003 HC R&B MH SENIOR/ADOLESCENT

## 2022-04-18 PROCEDURE — 99231 SBSQ HOSP IP/OBS SF/LOW 25: CPT | Performed by: PSYCHIATRY & NEUROLOGY

## 2022-04-18 RX ADMIN — DONEPEZIL HYDROCHLORIDE 10 MG: 10 TABLET ORAL at 20:53

## 2022-04-18 RX ADMIN — GABAPENTIN 100 MG: 100 CAPSULE ORAL at 12:32

## 2022-04-18 RX ADMIN — BUSPIRONE HYDROCHLORIDE 30 MG: 15 TABLET ORAL at 08:18

## 2022-04-18 RX ADMIN — GABAPENTIN 100 MG: 100 CAPSULE ORAL at 08:18

## 2022-04-18 RX ADMIN — LEVOTHYROXINE SODIUM 88 MCG: 0.09 TABLET ORAL at 08:18

## 2022-04-18 RX ADMIN — MEGESTROL ACETATE 20 MG: 20 TABLET ORAL at 08:18

## 2022-04-18 RX ADMIN — SALMETEROL XINAFOATE 1 PUFF: 50 POWDER, METERED ORAL; RESPIRATORY (INHALATION) at 08:18

## 2022-04-18 RX ADMIN — MEMANTINE 10 MG: 10 TABLET ORAL at 08:18

## 2022-04-18 RX ADMIN — SALMETEROL XINAFOATE 1 PUFF: 50 POWDER, METERED ORAL; RESPIRATORY (INHALATION) at 20:56

## 2022-04-18 RX ADMIN — GABAPENTIN 100 MG: 100 CAPSULE ORAL at 18:02

## 2022-04-18 RX ADMIN — ASPIRIN 81 MG: 81 TABLET, COATED ORAL at 08:18

## 2022-04-18 RX ADMIN — MEMANTINE 10 MG: 10 TABLET ORAL at 20:53

## 2022-04-18 RX ADMIN — Medication 5 MG: at 20:53

## 2022-04-18 RX ADMIN — CLOZAPINE 200 MG: 100 TABLET ORAL at 20:53

## 2022-04-18 RX ADMIN — BUSPIRONE HYDROCHLORIDE 30 MG: 15 TABLET ORAL at 20:53

## 2022-04-18 RX ADMIN — MIRTAZAPINE 15 MG: 15 TABLET, FILM COATED ORAL at 20:53

## 2022-04-18 RX ADMIN — HALOPERIDOL 1 MG: 1 TABLET ORAL at 20:53

## 2022-04-18 ASSESSMENT — ACTIVITIES OF DAILY LIVING (ADL)
DRESS: INDEPENDENT
LAUNDRY: WITH SUPERVISION
HYGIENE/GROOMING: INDEPENDENT;PROMPTS
DRESS: INDEPENDENT;PROMPTS
ORAL_HYGIENE: INDEPENDENT;PROMPTS
HYGIENE/GROOMING: INDEPENDENT
ORAL_HYGIENE: INDEPENDENT

## 2022-04-18 NOTE — PLAN OF CARE
Problem: Behavior Regulation Impairment (Psychotic Signs/Symptoms)  Goal: Improved Behavioral Control (Psychotic Signs/Symptoms)  Outcome: Ongoing, Progressing   Goal Outcome Evaluation:        Slept well for 9.5 hours, independent in repositioning in bed. No behavioral issues encountered this shift.

## 2022-04-18 NOTE — PLAN OF CARE
"  Problem: Behavior Regulation Impairment (Psychotic Signs/Symptoms)  Goal: Improved Behavioral Control (Psychotic Signs/Symptoms)  Outcome: Met   Goal Outcome Evaluation:    Plan of Care Reviewed With: patient               Nursing Assessment    Psychosis (H) [F29]    Admit Date: 1/26/2022    Length of Stay: 82    Patient evaluation continues. Assessed mood,anxiety,thoughts and behavior. Patient is  progressing towards goals. Patient is encouraged to participate in groups and assisted to develop healthy coping skills.  Patient denies auditory or visual hallucinations. /78 (BP Location: Left arm)   Pulse 99   Temp 97.3  F (36.3  C) (Oral)   Resp 18   Ht 1.778 m (5' 10\")   Wt 77 kg (169 lb 11.2 oz)   SpO2 97%   BMI 24.35 kg/m        Pt refused covid swab today    Mood: ok per pt    Patient reports depression none and reports anxiety none    Affect: flat blunted    Sleep: good 9 hours last night and naps during the day    Appetite: good 75%    SI: denies    HI: denies    SIB: denies      Medication Compliance medication complaint    Group participation: no    ADL's: refuses, needs much encouragement and positive reinforcement    Fall risk interventions: proper foot wear, orthostatic B/P    Rinku Score Interventions:none    Discharge planning in process    Refer to daily team meeting notes for individualized plan of care. Nursing will continue to assess.    *Scale is 1-10 and 10 is the worst.         "

## 2022-04-18 NOTE — PROGRESS NOTES
"PSYCHIATRY  PROGRESS NOTE     DATE OF SERVICE   04/18/2022       CHIEF COMPLAINT   \"I am alright.\"       SUBJECTIVE   Nursing reports: Plan of Care Reviewed With: patient      Pt continues to isolate to room except for meals. Ate approx 75% of dinner. Poverty of thought and speech. One-word responses. Oriented to self only. Affect flat. Poor eye contact. Hygiene poor. Gait steady. Med-compliant. Continue to encourage hydration. Continue with current treatment plan and recommendations. Pulse 110. Continue to monitor and reassess symptoms. Monitor response to medications. Monitor progress towards treatment goals. Encourage groups and participation.    Patient has been discussed with the  during team meeting.  We have received no updates on the guardianship process.     OBJECTIVE   Patient was seen and evaluated at bedside by himself, this was a face-to-face evaluation.  Patient reported that he is doing okay and he denies all psychiatric symptoms.  He has been observed talking to himself and at times he has not needed to the nursing staff that he is talking to his sister (that is not present in the unit).  Today he presented as calm and pleasantly confused.  He was preoccupied with having lunch.  Continues to isolate in his room and sleeps most of the day.       MEDICATIONS   Medications:  Scheduled Meds:    aspirin  81 mg Oral Daily     [Held by provider] atorvastatin  80 mg Oral At Bedtime     busPIRone HCl  30 mg Oral BID     cloZAPine  200 mg Oral At Bedtime     donepezil  10 mg Oral At Bedtime     gabapentin  100 mg Oral TID w/meals     haloperidol  1 mg Oral At Bedtime     levothyroxine  88 mcg Oral Daily     megestrol  20 mg Oral Daily     melatonin  5 mg Oral At Bedtime     memantine  10 mg Oral BID     mirtazapine  15 mg Oral At Bedtime     salmeterol  1 puff Inhalation BID     Continuous Infusions:  PRN Meds:.[Held by provider] acetaminophen, albuterol, alum & mag hydroxide-simethicone, " "benzocaine-menthol, hydrOXYzine, nicotine, polyethylene glycol, polyethylene glycol-propylene glycol PF, risperiDONE, senna-docusate, [Held by provider] traZODone    Medication adherence issues: MS Med Adherence Y/N: Yes, Hospitalization  Medication side effects: MEDICATION SIDE EFFECTS: no side effects reported  Benefit: Yes / No: Yes       ROS   A comprehensive review of systems was negative.       MENTAL STATUS EXAM   Vitals: /78 (BP Location: Left arm)   Pulse 99   Temp 97.3  F (36.3  C) (Oral)   Resp 18   Ht 1.778 m (5' 10\")   Wt 77 kg (169 lb 11.2 oz)   SpO2 97%   BMI 24.35 kg/m        Appearance:  No apparent distress  Mood: \"I am alright\"  Affect: Calm and pleasantly confused  Suicidal Ideation: denies   Homicidal Ideation: denies   Thought process: concrete  Thought content: Remains confused.  Talking to himself and responding to internal stimuli. Having AH.  Fund of Knowledge: Below average  Attention/Concentration: Easily distracted  Language ability: Significantly impaired  Memory: Global memory impairment  Insight:  Poor.  Judgement: Poor  Orientation: Only to person  Psychomotor Behavior: slowed    Muscle Strength and Tone: MuscleStrength: Normal and Atrophy  Gait and Station: Not evaluated       LABS   personally reviewed.     No results found for: PHENYTOIN, PHENOBARB, VALPROATE, CBMZ       DIAGNOSIS   Principal Problem:    Major neurocognitive disorder, due to multiple etiologies, with behavioral disturbance, severe (H)    Active Problem List:  Patient Active Problem List   Diagnosis     TBI with aggressive behavior     HTN (hypertension)     COPD (chronic obstructive pulmonary disease) (H)     Dementia with behavioral disturbance (H)     Chronic schizophrenia (H)     Smoker     Agitation     Behavior disturbance     Psychosis (H)     Major neurocognitive disorder, due to multiple etiologies, with behavioral disturbance, severe (H)     Paranoid schizophrenia, chronic condition with " acute exacerbation (H)          PLAN   1. Ongoing education given regarding diagnostic and treatment options with risks, benefits and alternatives and adequate verbalization of understanding.  2.  Medications       BuSpar 30 mg 2 times daily       Clozaril 200 mg at bedtime       Aricept 10 mg at bedtime       Gabapentin 100 mg 3 times a day       Haldol 1 mg at bedtime       Namenda 10 mg 2 times a day       Remeron 15 mg at bedtime       melatonin 5 mg at bedtime  3.  Medical team following the patient as needed   4.   coordinating a safe discharge plan with the patient.    Risk Assessment: [facility] RISK ASSESSMENT: Patient able to contract for safety    Coordination of Care:   Treatment Plan reviewed and physician signed, Care discussed with Care/Treatment Team Members, Chart reviewed and Patient seen      Re-Certification I certify that the inpatient psychiatric facility services furnished since the previous certification were, and continue to be, medically necessary for, either, treatment which could reasonably be expected to improve the patient s condition or diagnostic study and that the hospital records indicate that the services furnished were, either, intensive treatment services, admission and related services necessary for diagnostic study, or equivalent services.     I certify that the patient continues to need, on a daily basis, active treatment furnished directly by or requiring the supervision of inpatient psychiatric facility personnel.   I estimate 14 days of hospitalization is necessary for proper treatment of the patient. My plans for post-hospital care for this patient are  TBD     Ginger Dubon MD    -     04/18/2022  -     1:00 PM    Total time 15 minutes with > 50%spent on coordination of cares and psycho-education.    This note was created with help of Dragon dictation system. Grammatical / typing errors are not intentional.    Ginger Dubon MD

## 2022-04-18 NOTE — PLAN OF CARE
Assessment/Intervention/Current Symtoms and Care Coordination:  -Reviewed pt's record to get up to date with his current status and plan  -Rounded with team   -CTC checked in with patient.     -CTC received a call from maria g at [facility]. She asked for the patient's guardianship and CADI waiver status CTC notified her that we are still waiting for the guardianship process to be completed and established. She notified writer that they are still holding the bed for the patient and requested to get an update as soon as patient gets a guardian and active CADI waiver.       Discharge Plan or Goal:  Discharge to [facility] when guardianship have been established and CADI waiver gets activated.  [facility] is working on streamlining guardianship.     Barriers to Discharge:  History of aggression and elopement.  Lack of current guardian to make health decisions.        Referral Status:  None today  Legal: Comm/Yanez

## 2022-04-19 PROCEDURE — 99231 SBSQ HOSP IP/OBS SF/LOW 25: CPT | Performed by: PSYCHIATRY & NEUROLOGY

## 2022-04-19 PROCEDURE — 250N000013 HC RX MED GY IP 250 OP 250 PS 637: Performed by: PSYCHIATRY & NEUROLOGY

## 2022-04-19 PROCEDURE — 124N000003 HC R&B MH SENIOR/ADOLESCENT

## 2022-04-19 RX ADMIN — MEGESTROL ACETATE 20 MG: 20 TABLET ORAL at 08:04

## 2022-04-19 RX ADMIN — DONEPEZIL HYDROCHLORIDE 10 MG: 10 TABLET ORAL at 20:01

## 2022-04-19 RX ADMIN — MIRTAZAPINE 15 MG: 15 TABLET, FILM COATED ORAL at 20:01

## 2022-04-19 RX ADMIN — SALMETEROL XINAFOATE 1 PUFF: 50 POWDER, METERED ORAL; RESPIRATORY (INHALATION) at 08:05

## 2022-04-19 RX ADMIN — Medication 5 MG: at 20:03

## 2022-04-19 RX ADMIN — LEVOTHYROXINE SODIUM 88 MCG: 0.09 TABLET ORAL at 08:04

## 2022-04-19 RX ADMIN — HALOPERIDOL 1 MG: 1 TABLET ORAL at 20:01

## 2022-04-19 RX ADMIN — CLOZAPINE 200 MG: 100 TABLET ORAL at 20:00

## 2022-04-19 RX ADMIN — SALMETEROL XINAFOATE 1 PUFF: 50 POWDER, METERED ORAL; RESPIRATORY (INHALATION) at 20:03

## 2022-04-19 RX ADMIN — MEMANTINE 10 MG: 10 TABLET ORAL at 20:01

## 2022-04-19 RX ADMIN — GABAPENTIN 100 MG: 100 CAPSULE ORAL at 11:57

## 2022-04-19 RX ADMIN — MEMANTINE 10 MG: 10 TABLET ORAL at 08:04

## 2022-04-19 RX ADMIN — ASPIRIN 81 MG: 81 TABLET, COATED ORAL at 08:04

## 2022-04-19 RX ADMIN — BUSPIRONE HYDROCHLORIDE 30 MG: 15 TABLET ORAL at 08:04

## 2022-04-19 RX ADMIN — GABAPENTIN 100 MG: 100 CAPSULE ORAL at 08:04

## 2022-04-19 RX ADMIN — GABAPENTIN 100 MG: 100 CAPSULE ORAL at 18:08

## 2022-04-19 RX ADMIN — BUSPIRONE HYDROCHLORIDE 30 MG: 15 TABLET ORAL at 20:01

## 2022-04-19 ASSESSMENT — ACTIVITIES OF DAILY LIVING (ADL)
DRESS: INDEPENDENT
ORAL_HYGIENE: PROMPTS;INDEPENDENT
LAUNDRY: UNABLE TO COMPLETE
ORAL_HYGIENE: INDEPENDENT
HYGIENE/GROOMING: INDEPENDENT;WITH SUPERVISION
LAUNDRY: UNABLE TO COMPLETE
HYGIENE/GROOMING: PROMPTS;INDEPENDENT
DRESS: INDEPENDENT;PROMPTS;SCRUBS (BEHAVIORAL HEALTH)

## 2022-04-19 NOTE — PLAN OF CARE
Problem: Behavioral Health Plan of Care  Goal: Plan of Care Review  Outcome: Ongoing, Progressing  F   Problem: Behavior Regulation Impairment (Psychotic Signs/Symptoms)  Goal: Improved Behavioral Control (Psychotic Signs/Symptoms)  Outcome: Ongoing, Progressing   Goal Outcome Evaluation:  Plan of Care Reviewed With: patient  Patient presents with calm blunted mood,  affect is flat. Patient is isolative and withdrawn, he denies SI, SIB, HI, and AVH. Patient denies pain at this time, no depression and anxiety, did not attend group. Patient has a good appetite, and med-compliant. No medication side effects observed or endorsed by patient. Will continue to monitor.    Incidents to note: None    PRNs given this shift: None

## 2022-04-19 NOTE — PLAN OF CARE
Problem: Sleep Disturbance (Psychotic Signs/Symptoms)  Goal: Improved Sleep (Psychotic Signs/Symptoms)  Outcome: Ongoing, Progressing   Goal Outcome Evaluation:                  Slept well for 9 hours  Turned to sides independently  No behavioral concerns this shift.

## 2022-04-19 NOTE — PROGRESS NOTES
"PSYCHIATRY  PROGRESS NOTE     DATE OF SERVICE   04/19/2022       CHIEF COMPLAINT   \"I want to leave now.\"       SUBJECTIVE   Nursing reports: Plan of Care Reviewed With: patient  Patient presents with calm blunted mood,  affect is flat. Patient is isolative and withdrawn, he denies SI, SIB, HI, and AVH. Patient denies pain at this time, no depression and anxiety, did not attend group. Patient has a good appetite, and med-compliant. No medication side effects observed or endorsed by patient. Will continue to monitor.    Patient has been discussed with the  during team meeting.  We have received no updates on the guardianship process.     OBJECTIVE   Patient was seen and evaluated at bedside by himself, this was a face-to-face evaluation.  Patient insists that he needs to leave the hospital today and he has someone that can come and pick him up.  I reoriented the patient and discussed with him that he is under commitment and the  is looking into a safe discharge plan for him.  Patient was dismissive and verbalized poor understanding of this information.    Patient remains at his baseline.  He is confused and responding to internal stimuli.  Denies suicidal ideations or homicidal ideations and has been able to contract for safety.     MEDICATIONS   Medications:  Scheduled Meds:    aspirin  81 mg Oral Daily     [Held by provider] atorvastatin  80 mg Oral At Bedtime     busPIRone HCl  30 mg Oral BID     cloZAPine  200 mg Oral At Bedtime     donepezil  10 mg Oral At Bedtime     gabapentin  100 mg Oral TID w/meals     haloperidol  1 mg Oral At Bedtime     levothyroxine  88 mcg Oral Daily     megestrol  20 mg Oral Daily     melatonin  5 mg Oral At Bedtime     memantine  10 mg Oral BID     mirtazapine  15 mg Oral At Bedtime     salmeterol  1 puff Inhalation BID     Continuous Infusions:  PRN Meds:.[Held by provider] acetaminophen, albuterol, alum & mag hydroxide-simethicone, benzocaine-menthol, " "hydrOXYzine, nicotine, polyethylene glycol, polyethylene glycol-propylene glycol PF, risperiDONE, senna-docusate, [Held by provider] traZODone    Medication adherence issues: MS Med Adherence Y/N: Yes, Hospitalization  Medication side effects: MEDICATION SIDE EFFECTS: no side effects reported  Benefit: Yes / No: Yes       ROS   A comprehensive review of systems was negative.       MENTAL STATUS EXAM   Vitals: /89   Pulse 106   Temp 98.6  F (37  C) (Oral)   Resp 16   Ht 1.778 m (5' 10\")   Wt 76.2 kg (167 lb 14.4 oz)   SpO2 98%   BMI 24.09 kg/m        Appearance:  No apparent distress  Mood: \"I want to leave the hospital now\"  Affect: Calm and pleasantly confused  Suicidal Ideation: denies   Homicidal Ideation: denies   Thought process: concrete  Thought content: Remains confused.  Talking to himself and responding to internal stimuli. Having AH.  Fund of Knowledge: Below average  Attention/Concentration: Easily distracted  Language ability: Significantly impaired  Memory: Global memory impairment  Insight:  Poor.  Judgement: Poor  Orientation: Only to person  Psychomotor Behavior: slowed    Muscle Strength and Tone: MuscleStrength: Normal and Atrophy  Gait and Station: Not evaluated       LABS   personally reviewed.     No results found for: PHENYTOIN, PHENOBARB, VALPROATE, CBMZ       DIAGNOSIS   Principal Problem:    Major neurocognitive disorder, due to multiple etiologies, with behavioral disturbance, severe (H)    Active Problem List:  Patient Active Problem List   Diagnosis     TBI with aggressive behavior     HTN (hypertension)     COPD (chronic obstructive pulmonary disease) (H)     Dementia with behavioral disturbance (H)     Chronic schizophrenia (H)     Smoker     Agitation     Behavior disturbance     Psychosis (H)     Major neurocognitive disorder, due to multiple etiologies, with behavioral disturbance, severe (H)     Paranoid schizophrenia, chronic condition with acute exacerbation (H)      "     PLAN   1. Ongoing education given regarding diagnostic and treatment options with risks, benefits and alternatives and adequate verbalization of understanding.  2.  Medications       BuSpar 30 mg 2 times daily       Clozaril 200 mg at bedtime       Aricept 10 mg at bedtime       Gabapentin 100 mg 3 times a day       Haldol 1 mg at bedtime       Namenda 10 mg 2 times a day       Remeron 15 mg at bedtime       melatonin 5 mg at bedtime  3.  Medical team following the patient as needed   4.   coordinating a safe discharge plan with the patient.    Risk Assessment: [facility] RISK ASSESSMENT: Patient able to contract for safety    Coordination of Care:   Treatment Plan reviewed and physician signed, Care discussed with Care/Treatment Team Members, Chart reviewed and Patient seen      Re-Certification I certify that the inpatient psychiatric facility services furnished since the previous certification were, and continue to be, medically necessary for, either, treatment which could reasonably be expected to improve the patient s condition or diagnostic study and that the hospital records indicate that the services furnished were, either, intensive treatment services, admission and related services necessary for diagnostic study, or equivalent services.     I certify that the patient continues to need, on a daily basis, active treatment furnished directly by or requiring the supervision of inpatient psychiatric facility personnel.   I estimate 14 days of hospitalization is necessary for proper treatment of the patient. My plans for post-hospital care for this patient are  TBD     Ginger Dubon MD    -     04/19/2022  -     2:54 PM    Total time 15 minutes with > 50%spent on coordination of cares and psycho-education.    This note was created with help of Dragon dictation system. Grammatical / typing errors are not intentional.    Ginger Dubon MD

## 2022-04-19 NOTE — PLAN OF CARE
Problem: Decreased Participation and Engagement (Psychotic Signs/Symptoms)  Goal: Increased Participation and Engagement (Psychotic Signs/Symptoms)  Outcome: Ongoing, Progressing   Goal Outcome Evaluation:  Pt remains withdrawn and isolated to self. He is difficult to engage in conversation. He will answer simple yes no questions. He did not respond to or unable to answer how he was doing and if he needed anything else after his meds. He remains oriented to person only. Pt will make eye contact, mumbles.   Compliant with Clozaril and Haldol per Yanez.   Pt continues to oversleep during the days with uninterrupted sleep at night. Hygiene remains poor. No social interactions. Declined groups.   Fall precautions- pt is being escorted to chair in lounge for meals. Gait has been steady this shift.

## 2022-04-20 PROCEDURE — 99231 SBSQ HOSP IP/OBS SF/LOW 25: CPT | Performed by: PHYSICIAN ASSISTANT

## 2022-04-20 PROCEDURE — 250N000013 HC RX MED GY IP 250 OP 250 PS 637: Performed by: PSYCHIATRY & NEUROLOGY

## 2022-04-20 PROCEDURE — 124N000003 HC R&B MH SENIOR/ADOLESCENT

## 2022-04-20 RX ADMIN — ASPIRIN 81 MG: 81 TABLET, COATED ORAL at 08:06

## 2022-04-20 RX ADMIN — SALMETEROL XINAFOATE 1 PUFF: 50 POWDER, METERED ORAL; RESPIRATORY (INHALATION) at 20:27

## 2022-04-20 RX ADMIN — MEMANTINE 10 MG: 10 TABLET ORAL at 08:06

## 2022-04-20 RX ADMIN — BUSPIRONE HYDROCHLORIDE 30 MG: 15 TABLET ORAL at 20:26

## 2022-04-20 RX ADMIN — CLOZAPINE 200 MG: 100 TABLET ORAL at 20:26

## 2022-04-20 RX ADMIN — GABAPENTIN 100 MG: 100 CAPSULE ORAL at 12:49

## 2022-04-20 RX ADMIN — GABAPENTIN 100 MG: 100 CAPSULE ORAL at 08:05

## 2022-04-20 RX ADMIN — Medication 5 MG: at 20:26

## 2022-04-20 RX ADMIN — DONEPEZIL HYDROCHLORIDE 10 MG: 10 TABLET ORAL at 20:26

## 2022-04-20 RX ADMIN — MEGESTROL ACETATE 20 MG: 20 TABLET ORAL at 08:06

## 2022-04-20 RX ADMIN — MIRTAZAPINE 15 MG: 15 TABLET, FILM COATED ORAL at 20:26

## 2022-04-20 RX ADMIN — LEVOTHYROXINE SODIUM 88 MCG: 0.09 TABLET ORAL at 08:06

## 2022-04-20 RX ADMIN — GABAPENTIN 100 MG: 100 CAPSULE ORAL at 17:52

## 2022-04-20 RX ADMIN — MEMANTINE 10 MG: 10 TABLET ORAL at 20:26

## 2022-04-20 RX ADMIN — HALOPERIDOL 1 MG: 1 TABLET ORAL at 20:26

## 2022-04-20 RX ADMIN — SALMETEROL XINAFOATE 1 PUFF: 50 POWDER, METERED ORAL; RESPIRATORY (INHALATION) at 08:10

## 2022-04-20 RX ADMIN — BUSPIRONE HYDROCHLORIDE 30 MG: 15 TABLET ORAL at 08:05

## 2022-04-20 ASSESSMENT — ACTIVITIES OF DAILY LIVING (ADL)
HYGIENE/GROOMING: PROMPTS
LAUNDRY: UNABLE TO COMPLETE
DRESS: PROMPTS;SCRUBS (BEHAVIORAL HEALTH)
ORAL_HYGIENE: PROMPTS

## 2022-04-20 NOTE — PROGRESS NOTES
Patient was seen and evaluated earlier this morning.  During my interaction with the patient he was at his baseline.  He denies suicidal ideations and was shayan for safety.  There has been no medication changes.  We have no updates on patient's discharge.    I was informed in the afternoon that the patient had a fall.  I have asked the nurses to let the medical team know.    Gingermegan Dubon MD

## 2022-04-20 NOTE — PLAN OF CARE
Assessment/Intervention/Current Symtoms and Care Coordination:  -Reviewed pt's record to get up to date with his current status and plan  -Rounded with team   -CTC checked in with patient.          Discharge Plan or Goal:  Discharge to [facility] when guardianship have been established and CADI waiver gets activated.  [facility] is working on streamlining guardianship.     Barriers to Discharge:  History of aggression and elopement.  Lack of current guardian to make health decisions.      Referral Status:  None today  Legal: Comm/Yanez

## 2022-04-20 NOTE — PLAN OF CARE
Assessment/Intervention/Current Symtoms and Care Coordination:  -Reviewed pt's record to get up to date with his current status and plan  -Rounded with team   -CTC checked in with patient.         Discharge Plan or Goal:  Discharge to [facility] when guardianship have been established and CADI waiver gets activated.  [facility]SS is working on streamlining guardianship.     Barriers to Discharge:  History of aggression and elopement.  Lack of current guardian to make health decisions.         Referral Status:  None today  Legal: Comm/Yanez

## 2022-04-20 NOTE — PROGRESS NOTES
"BRIEF MEDICINE NOTE    Notified by RN that patient fell. History obtained from RN and patient. History from patient limited since he did not answer all questions during interview. Pt has a history of falls and staff assists when ambulating. Patient was getting up from a chair and fell backwards. Fall witnessed by staff. He did not hit his head or complain of injury/pain. Per patient, he occasionally falls but doesn't give further details. He has no complaints at this time and denies preceding chest pain or SOB. RN reports impaired cognition at baseline and no change in mental status since fall.     Vital signs:  Temp: 97.6  F (36.4  C) Temp src: Temporal BP: 104/74 Pulse: (!) 131   Resp: 16 SpO2: 97 % O2 Device: None (Room air)   Height: 177.8 cm (5' 10\") Weight: 76.2 kg (167 lb 14.4 oz)  Estimated body mass index is 24.09 kg/m  as calculated from the following:    Height as of this encounter: 1.778 m (5' 10\").    Weight as of this encounter: 76.2 kg (167 lb 14.4 oz).    Physical Exam:  General: Alert and oriented x 1. Knows self but did not name place and stated year, \"2101\" Appears older than stated age. NAD. Appears comfortable lying in bed.   HEENT: Head normocephalic, atraumatic. Anicteric sclera. PERRL. EOMI. Oral mucosa moist. Tongue midline. Pharnyx without exudates. Neck supple. No JVD.  Resp: No signs of respiratory distress. Lungs clear to ausculation bilaterally without rales, rhonchi or wheezes.   Cardiac: RRR. S1 and S2 normal intensity. II/VI systolic murmurs left sternal border. No rubs or gallops.   MSK: No joint tenderness or edema. Moves all extremities.   Neuro: Impaired cognition and appears to be at baseline per RN reports.  No focal deficits. Gait not tested.   Psych: Appropriate mood and affect initially but then became agitated with yelling.   Derm: Skin warm and dry. No rashes or skin breakdown.   Extremities: No cyanosis, clubbing or edema    Assessment and Plan  #Mechanical Fall-No " preceding symptoms prior to fall. No complaints of pain and did not hit head. No injuries noted on exam. Cognition impaired but at baseline per RN. No need for additional work up.   -Fall Precautions with plan to reduce falls as outlined in progress note by Daphne Bernstein, JENNIFER dated 4/20/2022.     Medicine will sign off. No further recommendations at this time.  Please feel free to reconsult if any new medical issues or concerns.      Susy Mathis PA-C  Internal Medicine [facility] Hospitalist  [facility]  Pager [phone]

## 2022-04-20 NOTE — PLAN OF CARE
"Problem: Psychomotor Impairment (Psychotic Signs/Symptoms)  Goal: Improved Psychomotor Symptoms (Psychotic Signs/Symptoms)  Intervention: Manage Psychomotor Movement  Recent Flowsheet Documentation  Taken 4/19/2022 2000 by Lucius Vega RN  Patient Performed Hygiene: patient refused  Activity (Behavioral Health): activity encouraged   Goal Outcome Evaluation:    The patient stayed in his room for the entire shift, only coming out for meals and snacks. He had a flat, blunted affect and responded to questions with \"yes\" or \"no.\" During check-in, he said \"no\" to all questions and turned down an invitation to a recreational therapy group as well as an offer of help with a shower. He took his medication on time and ate all of his meals and snacks. He had a typical unremarkable evening.  "

## 2022-04-20 NOTE — PLAN OF CARE
Problem: Decreased Participation and Engagement (Psychotic Signs/Symptoms)  Goal: Increased Participation and Engagement (Psychotic Signs/Symptoms)  Outcome: Ongoing, Progressing     Goal Outcome Evaluation:  Pt remains isolative to his room, withdrawn from social contact, and refuses groups. He answers staff with yes/ no answer or will remain mute and stare or will mumble out a swear word on occasion when too many staff around. No aggressive or hostile behavior noted. Hygiene is poor, appetite good, sleep is excessive. Staff continues to walk with pt until he is seated related to fall risk. Medication compliant.   BP 91/67, 110/78 take before breakfast when pt just woke up.   Continue POC as is.

## 2022-04-20 NOTE — PROGRESS NOTES
Pt was in lounge standing up from a chair and was observed by staff to fall back on the floor. He yelled out when he hit the floor. He denies hitting his head or any other injuries.   Pt is a fall risk with staff escorting him to the chair in the lounge and back due to his history of prior falls while trying to sit in the chairs. His feet have caught on the chair legs in the past. He is does not explain what happened this time. Pt did not alert staff he was going to get up.   Pt will be reminded to use staff assistance, monitor while in lounge, use heavier chairs to prevent sliding backwards, and use gripper socks.   Pt refuses to use walker, PT, and becomes agitated with frequent staff contact. Pt is oriented to person at baseline.   Will obtain shoes for better balance.   B/P: 104/74, T: 97.6, P: 131, R: 16    Notified Susy Clark PA, fall huddle complete- Seen by Susy, continue to monitor, no new orders

## 2022-04-20 NOTE — PLAN OF CARE
Goal Outcome Evaluation:      Patient slept well the whole night for 9.5 hours. No complaints made. Nothing unusual noted and no concerns raised.

## 2022-04-21 ENCOUNTER — APPOINTMENT (OUTPATIENT)
Dept: CT IMAGING | Facility: CLINIC | Age: 57
DRG: 885 | End: 2022-04-21
Attending: PSYCHIATRY & NEUROLOGY
Payer: COMMERCIAL

## 2022-04-21 LAB
ALBUMIN SERPL-MCNC: 3.4 G/DL (ref 3.4–5)
ALP SERPL-CCNC: 77 U/L (ref 40–150)
ALT SERPL W P-5'-P-CCNC: 108 U/L (ref 0–70)
ANION GAP SERPL CALCULATED.3IONS-SCNC: 9 MMOL/L (ref 3–14)
AST SERPL W P-5'-P-CCNC: 151 U/L (ref 0–45)
BASOPHILS # BLD AUTO: 0.1 10E3/UL (ref 0–0.2)
BASOPHILS NFR BLD AUTO: 1 %
BILIRUB SERPL-MCNC: 0.4 MG/DL (ref 0.2–1.3)
BUN SERPL-MCNC: 15 MG/DL (ref 7–30)
CALCIUM SERPL-MCNC: 9.6 MG/DL (ref 8.5–10.1)
CHLORIDE BLD-SCNC: 114 MMOL/L (ref 94–109)
CO2 SERPL-SCNC: 19 MMOL/L (ref 20–32)
CREAT SERPL-MCNC: 0.74 MG/DL (ref 0.66–1.25)
EOSINOPHIL # BLD AUTO: 1.1 10E3/UL (ref 0–0.7)
EOSINOPHIL NFR BLD AUTO: 22 %
ERYTHROCYTE [DISTWIDTH] IN BLOOD BY AUTOMATED COUNT: 13.8 % (ref 10–15)
GFR SERPL CREATININE-BSD FRML MDRD: >90 ML/MIN/1.73M2
GLUCOSE BLD-MCNC: 141 MG/DL (ref 70–99)
HCT VFR BLD AUTO: 43.4 % (ref 40–53)
HGB BLD-MCNC: 15.2 G/DL (ref 13.3–17.7)
IMM GRANULOCYTES # BLD: 0 10E3/UL
IMM GRANULOCYTES NFR BLD: 1 %
LYMPHOCYTES # BLD AUTO: 1.4 10E3/UL (ref 0.8–5.3)
LYMPHOCYTES NFR BLD AUTO: 28 %
MCH RBC QN AUTO: 31.8 PG (ref 26.5–33)
MCHC RBC AUTO-ENTMCNC: 35 G/DL (ref 31.5–36.5)
MCV RBC AUTO: 91 FL (ref 78–100)
MONOCYTES # BLD AUTO: 0.4 10E3/UL (ref 0–1.3)
MONOCYTES NFR BLD AUTO: 9 %
NEUTROPHILS # BLD AUTO: 1.9 10E3/UL (ref 1.6–8.3)
NEUTROPHILS NFR BLD AUTO: 39 %
NRBC # BLD AUTO: 0 10E3/UL
NRBC BLD AUTO-RTO: 0 /100
PLATELET # BLD AUTO: 176 10E3/UL (ref 150–450)
POTASSIUM BLD-SCNC: 3.9 MMOL/L (ref 3.4–5.3)
PROT SERPL-MCNC: 7.7 G/DL (ref 6.8–8.8)
RBC # BLD AUTO: 4.78 10E6/UL (ref 4.4–5.9)
SODIUM SERPL-SCNC: 142 MMOL/L (ref 133–144)
WBC # BLD AUTO: 4.9 10E3/UL (ref 4–11)

## 2022-04-21 PROCEDURE — 70450 CT HEAD/BRAIN W/O DYE: CPT | Mod: 26 | Performed by: STUDENT IN AN ORGANIZED HEALTH CARE EDUCATION/TRAINING PROGRAM

## 2022-04-21 PROCEDURE — 99233 SBSQ HOSP IP/OBS HIGH 50: CPT | Performed by: PHYSICIAN ASSISTANT

## 2022-04-21 PROCEDURE — 80053 COMPREHEN METABOLIC PANEL: CPT | Performed by: PSYCHIATRY & NEUROLOGY

## 2022-04-21 PROCEDURE — 93010 ELECTROCARDIOGRAM REPORT: CPT | Performed by: INTERNAL MEDICINE

## 2022-04-21 PROCEDURE — 99232 SBSQ HOSP IP/OBS MODERATE 35: CPT | Performed by: PSYCHIATRY & NEUROLOGY

## 2022-04-21 PROCEDURE — 250N000013 HC RX MED GY IP 250 OP 250 PS 637: Performed by: PSYCHIATRY & NEUROLOGY

## 2022-04-21 PROCEDURE — 93005 ELECTROCARDIOGRAM TRACING: CPT

## 2022-04-21 PROCEDURE — 70450 CT HEAD/BRAIN W/O DYE: CPT

## 2022-04-21 PROCEDURE — 82040 ASSAY OF SERUM ALBUMIN: CPT | Performed by: PSYCHIATRY & NEUROLOGY

## 2022-04-21 PROCEDURE — 36415 COLL VENOUS BLD VENIPUNCTURE: CPT | Performed by: PSYCHIATRY & NEUROLOGY

## 2022-04-21 PROCEDURE — 124N000003 HC R&B MH SENIOR/ADOLESCENT

## 2022-04-21 PROCEDURE — 85025 COMPLETE CBC W/AUTO DIFF WBC: CPT | Performed by: PSYCHIATRY & NEUROLOGY

## 2022-04-21 RX ADMIN — HALOPERIDOL 1 MG: 1 TABLET ORAL at 21:08

## 2022-04-21 RX ADMIN — MIRTAZAPINE 15 MG: 15 TABLET, FILM COATED ORAL at 21:08

## 2022-04-21 RX ADMIN — MEMANTINE 10 MG: 10 TABLET ORAL at 21:08

## 2022-04-21 RX ADMIN — GABAPENTIN 100 MG: 100 CAPSULE ORAL at 17:42

## 2022-04-21 RX ADMIN — SALMETEROL XINAFOATE 1 PUFF: 50 POWDER, METERED ORAL; RESPIRATORY (INHALATION) at 21:08

## 2022-04-21 RX ADMIN — CLOZAPINE 200 MG: 100 TABLET ORAL at 21:08

## 2022-04-21 RX ADMIN — BUSPIRONE HYDROCHLORIDE 30 MG: 15 TABLET ORAL at 08:47

## 2022-04-21 RX ADMIN — MEMANTINE 10 MG: 10 TABLET ORAL at 08:46

## 2022-04-21 RX ADMIN — DONEPEZIL HYDROCHLORIDE 10 MG: 10 TABLET ORAL at 21:08

## 2022-04-21 RX ADMIN — LEVOTHYROXINE SODIUM 88 MCG: 0.09 TABLET ORAL at 08:46

## 2022-04-21 RX ADMIN — GABAPENTIN 100 MG: 100 CAPSULE ORAL at 11:57

## 2022-04-21 RX ADMIN — GABAPENTIN 100 MG: 100 CAPSULE ORAL at 08:47

## 2022-04-21 RX ADMIN — MEGESTROL ACETATE 20 MG: 20 TABLET ORAL at 08:47

## 2022-04-21 RX ADMIN — Medication 5 MG: at 21:08

## 2022-04-21 RX ADMIN — ASPIRIN 81 MG: 81 TABLET, COATED ORAL at 08:47

## 2022-04-21 RX ADMIN — SALMETEROL XINAFOATE 1 PUFF: 50 POWDER, METERED ORAL; RESPIRATORY (INHALATION) at 12:22

## 2022-04-21 RX ADMIN — BUSPIRONE HYDROCHLORIDE 30 MG: 15 TABLET ORAL at 21:08

## 2022-04-21 ASSESSMENT — ACTIVITIES OF DAILY LIVING (ADL)
LAUNDRY: UNABLE TO COMPLETE
DRESS: PROMPTS
HYGIENE/GROOMING: PROMPTS
HYGIENE/GROOMING: PROMPTS
ORAL_HYGIENE: PROMPTS
ORAL_HYGIENE: PROMPTS
LAUNDRY: UNABLE TO COMPLETE
DRESS: PROMPTS

## 2022-04-21 NOTE — PLAN OF CARE
"Problem: Behavioral Health Plan of Care  Goal: Optimized Coping Skills in Response to Life Stressors  Outcome: Ongoing, Progressing     Problem: Behavior Regulation Impairment (Psychotic Signs/Symptoms)  Goal: Improved Behavioral Control (Psychotic Signs/Symptoms)  Outcome: Ongoing, Progressing    Patient is up in milieu for dinner but otherwise isolates to his room this shift.  He is pleasant an cooperative.  States, \"I'm doing ok,\" \"I'm tired,\" \"I want a cigarette.\"  Patient denies pain but repeats multiple times that he feels tired.  Patient encouraged to shower but declined. Patient is medication compliant and remains safe on unit.  Will continue to monitor  [place] RN   "

## 2022-04-21 NOTE — PLAN OF CARE
Goal Outcome Evaluation:      Patient slept well without interruptions for 9.25  hours.  No complaints made. Nothing unusual noted.

## 2022-04-21 NOTE — PROGRESS NOTES
"BRIEF MEDICINE NOTE    Received page from RN that patient had an unresponsive episode. Patient seen and evaluated. History obtained from nursing and Dr. Root (who was present for episode). Patient was doing well after breakfast and went to his room. When he was seen by Dr. Root he was noted to be unresponsive and not following commands. Eyes were open and he was noted to be drooling. Episode lasted 1 minute. No obvious seizure activity.   Patient has no complaints and a full 10 point ROS is negative.    Vital signs:  /87   Pulse 105   Temp 97.3  F (36.3  C) (Temporal)   Resp 16   Ht 1.778 m (5' 10\")   Wt 76.2 kg (167 lb 14.4 oz)   SpO2 99%   BMI 24.09 kg/m      Physical Exam:  General: Alert and oriented x 1. Knows self but did not name place and stated year, \"2099\" (same as yesterday) Appears older than stated age. NAD. Appears comfortable lying in bed.   HEENT: Head normocephalic, atraumatic. Anicteric sclera. PERRL. EOMI. Oral mucosa moist. Tongue midline. Pharnyx without exudates. Neck supple. No JVD.  Resp: No signs of respiratory distress. Lungs clear to ausculation bilaterally without rales, rhonchi or wheezes.   Cardiac: RRR. S1 and S2 normal intensity. II/VI systolic murmurs left sternal border. No rubs or gallops.   MSK: No joint tenderness or edema. Moves all extremities.   Neuro: Impaired cognition as previous. CN II-XII intact. No visual field deficits. Cerebellar testing intact. Muscle strength symmetric. Gait stable.  No focal deficits.   Psych: Appropriate mood and affect. No agitation. Cooperative with exam.   Derm: Skin warm and dry. No rashes or skin breakdown.   Extremities: No cyanosis, clubbing or edema    Assessment and Plan  #Unresponsive  Noted to be staring, drooling and not responding to questions. Unclear etiology. Has history of CVA and TBI. Episode lasted <1 minute. No seizure activity. No evidence of tongue biting on exam. No neuro deficits on exam and appears to be " back to baseline mental status.   Discussed the following recommendations with Dr. Root:  -CT head without contrast  -Consider EEG (Dr. Root to order if needed)  -UA with reflex  -Labs reviewed and no explanation for episode (unclear reason for persistent hyperchloremia and transaminitis stable).   -EKG ordered and pending but episode did not seem cardiac in nature    Medicine will follow pending work up    Susy Mathis PA-C  Internal Medicine [facility] Hospitalist  [facility]  Pager [phone]

## 2022-04-21 NOTE — PROGRESS NOTES
BEHAVIORAL TEAM DISCUSSION     04/18/22 1620   Individualization/Patient Specific Goals   Patient Personal Strengths resilient   Patient Vulnerabilities poor physical health;lacks insight into illness;other (see comments)   Interprofessional Rounds   Summary Patient continues to isolate in room, but is in milieu at times. Patient was accepted to [facility] pending guardianship approval.   Participants milieu/psych techs;social work;psychiatrist   Team Discussion   Participants Dr. Otilia Dubon; RN; YAHAIRA Singh; Joi Stearns OT   Progress patient is at baseline   Anticipated length of stay 3-5 weeks pending guardianship approval and funding for placement   Continued Stay Criteria/Rationale guardianship/placement funding not established   Medical/Physical guardianship/placement funding not established   Plan [facility]charge to [facility] once barries have been elimin   Rationale for change in precautions or plan No change   Anticipated Discharge Disposition group home;assisted living     Precautions:   Behavioral Orders   Procedures    Code 1 - Restrict to Unit    Code 2     CT scan only    Fall precautions    Routine Programming     As clinically indicated    Status 15     Every 15 minutes.

## 2022-04-21 NOTE — PLAN OF CARE
"  Problem: Fall Injury Risk  Goal: Absence of Fall and Fall-Related Injury  Outcome: Ongoing, Progressing     Problem: Behavioral Health Plan of Care  Goal: Patient-Specific Goal (Individualization)  Flowsheets (Taken 4/21/2022 0953)  Individualized Care Needs: Pt experienced 2 episodes of not responding, staring off, and drooling.  Patient Vulnerabilities: (limited cognitive abilities)    poor physical health    lacks insight into illness    other (see comments)  Patient-Specific Goals (Include Timeframe): Pt will maintain physical health.  Patient Personal Strengths: resilient  Note: Shift [place]:  Pt was found by Dr Root non responsive, staring off, and drooling. Pt was assessed by writer and second nurse. Pt was assessed for hallucinations, but did not respond to verbal questions. Slight eye movement, body rigid. This lasted a very brief time. He did start to respond with grunts, slurred speech, garbled, lungs sound congested. He did not follow directions with hand . Sat pt up with assistance, he was wobbling back to his right. B/P: 116/87, T: 97.3, P: 105, R: 16. Unable to obtain orthostatic  Notified ASH Mora.     Pt has sitter at bedside to monitor for any other episodes. Pt is calm, cooperative, and asking for [place], declined food options available.     Prior to episode, pt walked to breakfast with writer. He was more verbal than typical. His gait was unsteady, slight buckle at the knees. Pt reported feeling weak legs from \"sleeping too much\".     Goal Status:  In Process     Goal Outcome Evaluation:   CBC w plt and diff- absolute eosinophils 1.1 H  CMP chloride 114 H, Co2 19 L, glucose 141 H,  H,  H   EKG- Right Bundle Branch Block- similar to past EKG- notified Dr Root and ASH Mora   Head CT- results in [facility]/ needs to be collected    Pt was cooperative with ASH Mora assessment. See progress note.   Bedside sitter discontinued           "

## 2022-04-21 NOTE — PROGRESS NOTES
"PSYCHIATRY  PROGRESS NOTE     DATE OF SERVICE   04/21/2022       CHIEF COMPLAINT   Patient was admitted due to inability to safely care for himself and psychosis.       SUBJECTIVE   Nursing reports: Patient is up in milieu for dinner but otherwise isolates to his room this shift.  He is pleasant an cooperative.  States, \"I'm doing ok,\" \"I'm tired,\" \"I want a cigarette.\"  Patient denies pain but repeats multiple times that he feels tired.  Patient encouraged to shower but declined. Patient is medication compliant and remains safe on unit.  Will continue to monitor  [place] RN     Patient has been discussed with the  during team meeting.   has continue to follow-up with the guardianship process.     OBJECTIVE   Patient was seen and evaluated at bedside by himself, this was a face-to-face evaluation.  When I entered the room the patient was observed staring at the wall, he was drooling and did not responded to any questions.  I asked the nurse in charge of the patient to come in the room and to help me get some vital signs for the patient.  Vital signs were within normal limits.  During this assessment the patient was not following commands, he was slow to respond and he was for the most part not talking.  Nurse sent a message to the medical team and I have ordered a head CT scan, EKG and blood work.  In the meantime patient has been with the staff.    I have communicated with the medical team and discussed the patient with them.  We will continue to encourage the patient to have an EKG done. PA will see the patient today and provide further recommendations.     MEDICATIONS   Medications:  Scheduled Meds:    aspirin  81 mg Oral Daily     [Held by provider] atorvastatin  80 mg Oral At Bedtime     busPIRone HCl  30 mg Oral BID     cloZAPine  200 mg Oral At Bedtime     donepezil  10 mg Oral At Bedtime     gabapentin  100 mg Oral TID w/meals     haloperidol  1 mg Oral At Bedtime     " "levothyroxine  88 mcg Oral Daily     megestrol  20 mg Oral Daily     melatonin  5 mg Oral At Bedtime     memantine  10 mg Oral BID     mirtazapine  15 mg Oral At Bedtime     salmeterol  1 puff Inhalation BID     Continuous Infusions:  PRN Meds:.[Held by provider] acetaminophen, albuterol, alum & mag hydroxide-simethicone, benzocaine-menthol, hydrOXYzine, nicotine, polyethylene glycol, polyethylene glycol-propylene glycol PF, risperiDONE, senna-docusate, [Held by provider] traZODone    Medication adherence issues: MS Med Adherence Y/N: Yes, Hospitalization  Medication side effects: MEDICATION SIDE EFFECTS: no side effects reported  Benefit: Yes / No: Yes       ROS   A comprehensive review of systems was negative.       MENTAL STATUS EXAM   Vitals: /87   Pulse 105   Temp 97.3  F (36.3  C) (Temporal)   Resp 16   Ht 1.778 m (5' 10\")   Wt 76.2 kg (167 lb 14.4 oz)   SpO2 99%   BMI 24.09 kg/m        Appearance:  No apparent distress  Mood: \"I am feeling dizzy\"  Affect: Confused  Suicidal Ideation: denies   Homicidal Ideation: denies   Thought process: concrete  Thought content: Remains confused.    Fund of Knowledge: Below average  Attention/Concentration: Easily distracted  Language ability: Significantly impaired  Memory: Global memory impairment  Insight:  Poor.  Judgement: Poor  Orientation: Only to person  Psychomotor Behavior: slowed    Muscle Strength and Tone: MuscleStrength: Normal and Atrophy  Gait and Station: Not evaluated       LABS   personally reviewed.     No results found for: PHENYTOIN, PHENOBARB, VALPROATE, CBMZ       DIAGNOSIS   Principal Problem:    Major neurocognitive disorder, due to multiple etiologies, with behavioral disturbance, severe (H)    Active Problem List:  Patient Active Problem List   Diagnosis     TBI with aggressive behavior     HTN (hypertension)     COPD (chronic obstructive pulmonary disease) (H)     Dementia with behavioral disturbance (H)     Chronic schizophrenia " (H)     Smoker     Agitation     Behavior disturbance     Psychosis (H)     Major neurocognitive disorder, due to multiple etiologies, with behavioral disturbance, severe (H)     Paranoid schizophrenia, chronic condition with acute exacerbation (H)          PLAN   1. Ongoing education given regarding diagnostic and treatment options with risks, benefits and alternatives and adequate verbalization of understanding.  2.  Medications       BuSpar 30 mg 2 times daily       Clozaril 200 mg at bedtime       Aricept 10 mg at bedtime       Gabapentin 100 mg 3 times a day       Haldol 1 mg at bedtime       Namenda 10 mg 2 times a day       Remeron 15 mg at bedtime       melatonin 5 mg at bedtime  3.  Medical team following the patient   4.   coordinating a safe discharge plan with the patient.    Risk Assessment: [facility] RISK ASSESSMENT: Patient able to contract for safety    Coordination of Care:   Treatment Plan reviewed and physician signed, Care discussed with Care/Treatment Team Members, Chart reviewed and Patient seen      Re-Certification I certify that the inpatient psychiatric facility services furnished since the previous certification were, and continue to be, medically necessary for, either, treatment which could reasonably be expected to improve the patient s condition or diagnostic study and that the hospital records indicate that the services furnished were, either, intensive treatment services, admission and related services necessary for diagnostic study, or equivalent services.     I certify that the patient continues to need, on a daily basis, active treatment furnished directly by or requiring the supervision of inpatient psychiatric facility personnel.   I estimate 14 days of hospitalization is necessary for proper treatment of the patient. My plans for post-hospital care for this patient are  TBD     Ginger Dubon MD    -     04/21/2022  -     12:21 PM    Total time 25 minutes  with > 50%spent on coordination of cares and psycho-education.    This note was created with help of Dragon dictation system. Grammatical / typing errors are not intentional.    Ginger Dubon MD

## 2022-04-22 PROCEDURE — 124N000003 HC R&B MH SENIOR/ADOLESCENT

## 2022-04-22 PROCEDURE — 250N000013 HC RX MED GY IP 250 OP 250 PS 637: Performed by: PHYSICIAN ASSISTANT

## 2022-04-22 PROCEDURE — 99231 SBSQ HOSP IP/OBS SF/LOW 25: CPT | Performed by: PSYCHIATRY & NEUROLOGY

## 2022-04-22 PROCEDURE — 250N000013 HC RX MED GY IP 250 OP 250 PS 637: Performed by: PSYCHIATRY & NEUROLOGY

## 2022-04-22 RX ADMIN — BUSPIRONE HYDROCHLORIDE 30 MG: 15 TABLET ORAL at 09:08

## 2022-04-22 RX ADMIN — BUSPIRONE HYDROCHLORIDE 30 MG: 15 TABLET ORAL at 19:44

## 2022-04-22 RX ADMIN — POLYETHYLENE GLYCOL 3350 17 G: 17 POWDER, FOR SOLUTION ORAL at 19:41

## 2022-04-22 RX ADMIN — SALMETEROL XINAFOATE 1 PUFF: 50 POWDER, METERED ORAL; RESPIRATORY (INHALATION) at 19:41

## 2022-04-22 RX ADMIN — HALOPERIDOL 1 MG: 1 TABLET ORAL at 19:45

## 2022-04-22 RX ADMIN — SENNOSIDES AND DOCUSATE SODIUM 2 TABLET: 50; 8.6 TABLET ORAL at 19:45

## 2022-04-22 RX ADMIN — GABAPENTIN 100 MG: 100 CAPSULE ORAL at 09:08

## 2022-04-22 RX ADMIN — ASPIRIN 81 MG: 81 TABLET, COATED ORAL at 09:08

## 2022-04-22 RX ADMIN — MEMANTINE 10 MG: 10 TABLET ORAL at 09:08

## 2022-04-22 RX ADMIN — CLOZAPINE 200 MG: 100 TABLET ORAL at 19:43

## 2022-04-22 RX ADMIN — MEGESTROL ACETATE 20 MG: 20 TABLET ORAL at 09:08

## 2022-04-22 RX ADMIN — LEVOTHYROXINE SODIUM 88 MCG: 0.09 TABLET ORAL at 09:08

## 2022-04-22 RX ADMIN — GABAPENTIN 100 MG: 100 CAPSULE ORAL at 12:50

## 2022-04-22 RX ADMIN — GABAPENTIN 100 MG: 100 CAPSULE ORAL at 17:11

## 2022-04-22 RX ADMIN — SALMETEROL XINAFOATE 1 PUFF: 50 POWDER, METERED ORAL; RESPIRATORY (INHALATION) at 09:12

## 2022-04-22 RX ADMIN — Medication 5 MG: at 19:44

## 2022-04-22 RX ADMIN — MIRTAZAPINE 15 MG: 15 TABLET, FILM COATED ORAL at 19:44

## 2022-04-22 RX ADMIN — MEMANTINE 10 MG: 10 TABLET ORAL at 19:44

## 2022-04-22 RX ADMIN — DONEPEZIL HYDROCHLORIDE 10 MG: 10 TABLET ORAL at 19:44

## 2022-04-22 ASSESSMENT — ACTIVITIES OF DAILY LIVING (ADL)
HYGIENE/GROOMING: PROMPTS
ORAL_HYGIENE: PROMPTS
DRESS: PROMPTS
LAUNDRY: UNABLE TO COMPLETE

## 2022-04-22 NOTE — PLAN OF CARE
Problem: Depression  Goal: Improved Mood  Outcome: Ongoing, Progressing     Problem: Fall Injury Risk  Goal: Absence of Fall and Fall-Related Injury  Outcome: Ongoing, Progressing     Problem: Behavioral Health Plan of Care  Goal: Adheres to Safety Considerations for Self and Others  Outcome: Ongoing, Progressing   Goal Outcome Evaluation:    Plan of Care Reviewed With: patient      Patient calm, cooperative when approached, denies SI/SIB, denies hallucination, claimed depression 5/10, no anxiety. Pt withdrawn to room, isolated self, did not join activity, came out for dinner, ate 75% of his food. Pt denies any pain, claimed no BM for 4 days now.  Speech garbled, voice soft. Pt medication compliant, refused cares, refused to use urinal so urine sample can be collected. PRN Senna and Miralax were given, awaiting result.

## 2022-04-22 NOTE — PLAN OF CARE
Problem: Behavioral Health Plan of Care  Goal: Adheres to Safety Considerations for Self and Others  Outcome: Ongoing, Progressing     Problem: Mood Impairment (Psychotic Signs/Symptoms)  Goal: Improved Mood Symptoms (Psychotic Signs/Symptoms)  Outcome: Ongoing, Progressing   Goal Outcome Evaluation:    Plan of Care Reviewed With: patient     Behavioral  Pt was in his room all shift and denied all mental health symptoms. Pt mood is neutral and presents with a flat, blunted affect. Pt is comm and [place]. He denies SI thoughts, anxiety, depression, and AVH. Pt was also reminded several times about using collecting the urine for UA, but all the time used the toilet without utilizing the hut to catch urine. Staff reminded to plan on another possible way to incentivize pt to comply--otherwise, no other concerns about this shift.   Medical Alerts  None  Plan  Continue to monitor

## 2022-04-22 NOTE — PLAN OF CARE
Goal Outcome Evaluation:      Patient slept well the whole night for 10.5 hours. Nothing unusual noted. No complaints made.

## 2022-04-22 NOTE — PROGRESS NOTES
PSYCHIATRY  PROGRESS NOTE     DATE OF SERVICE   04/22/2022       CHIEF COMPLAINT   Patient was admitted due to inability to safely care for himself and psychosis.       SUBJECTIVE   Nursing reports: Patient remains at his baseline and no new behaviors have been reported.    Patient has been discussed with the  during team meeting.   has continue to follow-up with the guardianship process.     OBJECTIVE   Patient was seen and evaluated at bedside by himself, this was a face-to-face evaluation.  Patient presented as calm and cooperative towards the assessment.  He continues to denied all psychiatric symptoms but I observed him talking to himself.  Denies feeling dizzy, denies any pain.  Continues to be pleasantly confused and only oriented to self.     MEDICATIONS   Medications:  Scheduled Meds:    aspirin  81 mg Oral Daily     [Held by provider] atorvastatin  80 mg Oral At Bedtime     busPIRone HCl  30 mg Oral BID     cloZAPine  200 mg Oral At Bedtime     donepezil  10 mg Oral At Bedtime     gabapentin  100 mg Oral TID w/meals     haloperidol  1 mg Oral At Bedtime     levothyroxine  88 mcg Oral Daily     megestrol  20 mg Oral Daily     melatonin  5 mg Oral At Bedtime     memantine  10 mg Oral BID     mirtazapine  15 mg Oral At Bedtime     salmeterol  1 puff Inhalation BID     Continuous Infusions:  PRN Meds:.[Held by provider] acetaminophen, albuterol, alum & mag hydroxide-simethicone, benzocaine-menthol, hydrOXYzine, nicotine, polyethylene glycol, polyethylene glycol-propylene glycol PF, risperiDONE, senna-docusate, [Held by provider] traZODone    Medication adherence issues: MS Med Adherence Y/N: Yes, Hospitalization  Medication side effects: MEDICATION SIDE EFFECTS: no side effects reported  Benefit: Yes / No: Yes       ROS   A comprehensive review of systems was negative.       MENTAL STATUS EXAM   Vitals: /79   Pulse 94   Temp 98.2  F (36.8  C) (Temporal)   Resp 16   Ht 1.778  "m (5' 10\")   Wt 76.2 kg (167 lb 14.4 oz)   SpO2 99%   BMI 24.09 kg/m        Appearance:  No apparent distress  Mood: \"I am okay\"  Affect: Confused  Suicidal Ideation: denies   Homicidal Ideation: denies   Thought process: concrete  Thought content: Remains confused and delusional.    Fund of Knowledge: Below average  Attention/Concentration: Easily distracted  Language ability: Significantly impaired  Memory: Global memory impairment  Insight:  Poor.  Judgement: Poor  Orientation: Only to person  Psychomotor Behavior: slowed    Muscle Strength and Tone: MuscleStrength: Normal and Atrophy  Gait and Station: Not evaluated       LABS   personally reviewed.     No results found for: PHENYTOIN, PHENOBARB, VALPROATE, CBMZ       DIAGNOSIS   Principal Problem:    Major neurocognitive disorder, due to multiple etiologies, with behavioral disturbance, severe (H)    Active Problem List:  Patient Active Problem List   Diagnosis     TBI with aggressive behavior     HTN (hypertension)     COPD (chronic obstructive pulmonary disease) (H)     Dementia with behavioral disturbance (H)     Chronic schizophrenia (H)     Smoker     Agitation     Behavior disturbance     Psychosis (H)     Major neurocognitive disorder, due to multiple etiologies, with behavioral disturbance, severe (H)     Paranoid schizophrenia, chronic condition with acute exacerbation (H)          PLAN   1. Ongoing education given regarding diagnostic and treatment options with risks, benefits and alternatives and adequate verbalization of understanding.  2.  Medications       BuSpar 30 mg 2 times daily       Clozaril 200 mg at bedtime       Aricept 10 mg at bedtime       Gabapentin 100 mg 3 times a day       Haldol 1 mg at bedtime       Namenda 10 mg 2 times a day       Remeron 15 mg at bedtime       melatonin 5 mg at bedtime  3.  Medical team following the patient   4.   coordinating a safe discharge plan with the patient.    Risk Assessment: IP AC " RISK ASSESSMENT: Patient able to contract for safety    Coordination of Care:   Treatment Plan reviewed and physician signed, Care discussed with Care/Treatment Team Members, Chart reviewed and Patient seen      Re-Certification I certify that the inpatient psychiatric facility services furnished since the previous certification were, and continue to be, medically necessary for, either, treatment which could reasonably be expected to improve the patient s condition or diagnostic study and that the hospital records indicate that the services furnished were, either, intensive treatment services, admission and related services necessary for diagnostic study, or equivalent services.     I certify that the patient continues to need, on a daily basis, active treatment furnished directly by or requiring the supervision of inpatient psychiatric facility personnel.   I estimate 14 days of hospitalization is necessary for proper treatment of the patient. My plans for post-hospital care for this patient are  TBD     Ginger Dubon MD    -     04/22/2022  -     4:11 PM    Total time 15 minutes with > 50%spent on coordination of cares and psycho-education.    This note was created with help of Dragon dictation system. Grammatical / typing errors are not intentional.    Ginger Dubon MD

## 2022-04-22 NOTE — PLAN OF CARE
Problem: Behavioral Health Plan of Care  Goal: Optimized Coping Skills in Response to Life Stressors  Outcome: Ongoing, Not Progressing  Goal: Develops/Participates in Therapeutic [place] to Support Successful Transition  Outcome: Ongoing, Not Progressing     Problem: Behavior Regulation Impairment (Psychotic Signs/Symptoms)  Goal: Improved Behavioral Control (Psychotic Signs/Symptoms)  Outcome: Ongoing, Progressing    Behavioral  Pt slept 10.5 hours overnight;  Pt pleasant and cooperative upon approach;  pt compliant with medications - refuses cares; Eating and hydrating adequately; Pt isolative and withdrawn to room/self- came out to the milieu for meals; did not attend groups; speech garbled but appropriate in context; Pt denied SI, SIB, HI, and hallucinations; Denied all mental health symptoms;    Medical  Pt has been reminded multiple times to use urinal as he refused to use hat for urine collection. Pt has not been compliant with either method for collection.    Pt seen for unresponsive episode yesterday and unsteadiness. Medicine signed off - per medicine - continue to monitor - obtain UA.     Plan  Pending placement

## 2022-04-22 NOTE — PROGRESS NOTES
BRIEF MEDICINE NOTE    Labs and RN notes overnight reviewed. No complaints overnight, but patient has not provided a urine specimen for UA. Remainder of workup yesterday following unresponsive episode was benign. Head CT showed no intracranial findings which could explain the episode. CBC unremarkable, no anemia, no WBC elevation. CMP showing persistent hyperchloremia, but no clear etiology for this. Transaminitis stable. EEG will be held off as he has improved from a clinical perspective as he is engaging, responsive, cooperative. VSS reviewed and all WNL.    Plan:  -Obtain UA   -Continue to monitor    Medicine will sign off, please page with any additional concerns.     Cain Lawson PA-C  Hospitalist Service  Contact information available via [facility] Paging/Directory

## 2022-04-23 PROCEDURE — 250N000013 HC RX MED GY IP 250 OP 250 PS 637: Performed by: PSYCHIATRY & NEUROLOGY

## 2022-04-23 PROCEDURE — 124N000003 HC R&B MH SENIOR/ADOLESCENT

## 2022-04-23 RX ADMIN — SALMETEROL XINAFOATE 1 PUFF: 50 POWDER, METERED ORAL; RESPIRATORY (INHALATION) at 08:29

## 2022-04-23 RX ADMIN — CLOZAPINE 200 MG: 100 TABLET ORAL at 20:08

## 2022-04-23 RX ADMIN — ASPIRIN 81 MG: 81 TABLET, COATED ORAL at 08:23

## 2022-04-23 RX ADMIN — GABAPENTIN 100 MG: 100 CAPSULE ORAL at 08:24

## 2022-04-23 RX ADMIN — BUSPIRONE HYDROCHLORIDE 30 MG: 15 TABLET ORAL at 20:08

## 2022-04-23 RX ADMIN — MEGESTROL ACETATE 20 MG: 20 TABLET ORAL at 08:24

## 2022-04-23 RX ADMIN — DONEPEZIL HYDROCHLORIDE 10 MG: 10 TABLET ORAL at 20:08

## 2022-04-23 RX ADMIN — MIRTAZAPINE 15 MG: 15 TABLET, FILM COATED ORAL at 20:08

## 2022-04-23 RX ADMIN — GABAPENTIN 100 MG: 100 CAPSULE ORAL at 12:15

## 2022-04-23 RX ADMIN — BUSPIRONE HYDROCHLORIDE 30 MG: 15 TABLET ORAL at 08:24

## 2022-04-23 RX ADMIN — Medication 5 MG: at 20:09

## 2022-04-23 RX ADMIN — MEMANTINE 10 MG: 10 TABLET ORAL at 08:24

## 2022-04-23 RX ADMIN — HALOPERIDOL 1 MG: 1 TABLET ORAL at 20:08

## 2022-04-23 RX ADMIN — GABAPENTIN 100 MG: 100 CAPSULE ORAL at 17:35

## 2022-04-23 RX ADMIN — MEMANTINE 10 MG: 10 TABLET ORAL at 20:09

## 2022-04-23 RX ADMIN — SALMETEROL XINAFOATE 1 PUFF: 50 POWDER, METERED ORAL; RESPIRATORY (INHALATION) at 20:07

## 2022-04-23 RX ADMIN — LEVOTHYROXINE SODIUM 88 MCG: 0.09 TABLET ORAL at 08:24

## 2022-04-23 ASSESSMENT — ACTIVITIES OF DAILY LIVING (ADL)
LAUNDRY: UNABLE TO COMPLETE
ORAL_HYGIENE: PROMPTS
HYGIENE/GROOMING: PROMPTS
DRESS: PROMPTS

## 2022-04-23 NOTE — PLAN OF CARE
Problem: Behavioral Health Plan of Care  Goal: Optimized Coping Skills in Response to Life Stressors  Outcome: Ongoing, Not Progressing  Goal: Develops/Participates in Therapeutic [place] to Support Successful Transition  Outcome: Ongoing, Not Progressing    Goal Outcome Evaluation:    Behavioral  Pt slept 9.0 hours overnight; Pt disoriented to situation and place; Pt guarded but cooperative upon approach;  pt compliant with medications; declines cares;  Pt declined breakfast - ate 50% of lunch  - hydrating minimally with encouragement; Pt isolative to room/self; did not attend groups; speech incoherent at times as pt mumbles but appropriate in context; Pt denied SI, SIB, and HI; denied hallucinations; however appears to be responding to internal stimuli as he was mumbling a with his eyes darting around room; Pt affect flat and blunted; mood hopeless and depressed.     Medical  Pt encouraged to use urinal, however continues to decline providing urine sample     Plan  Pending placement; date TBD.

## 2022-04-23 NOTE — PLAN OF CARE
Problem: Behavioral Health Plan of Care  Goal: Adheres to Safety Considerations for Self and Others  Outcome: Ongoing, Progressing     Patient calm, cooperative when approached, denies SI/SIB, denies hallucination, denies anxiety, depressed 5/10 , speech gabled, voice soft hard to understand. Pt withdrawn in his room, isolated self, came out to eat dinner,ate 75% of his food, did not join activity, refused cares. Claimed had bowel movement last night, still need to collect urine, specimen cup at pt's bathroom, pt agitated after constantly reminding him about collecting urine.  Pt denies any pain, medication compliant.

## 2022-04-23 NOTE — PLAN OF CARE
Goal Outcome Evaluation:      Patient slept soundly the whole night for 9 hours.  Nothing unusual noted. No complaints made. Patient still for UA/UC. Refused to use a urinal. To continue to encourage to use a urinal in order to collect a urine sample for the above urine test. A specimen collecting cup was also provided.

## 2022-04-24 PROCEDURE — 250N000013 HC RX MED GY IP 250 OP 250 PS 637: Performed by: PSYCHIATRY & NEUROLOGY

## 2022-04-24 PROCEDURE — 124N000003 HC R&B MH SENIOR/ADOLESCENT

## 2022-04-24 RX ADMIN — SALMETEROL XINAFOATE 1 PUFF: 50 POWDER, METERED ORAL; RESPIRATORY (INHALATION) at 19:53

## 2022-04-24 RX ADMIN — BUSPIRONE HYDROCHLORIDE 30 MG: 15 TABLET ORAL at 08:36

## 2022-04-24 RX ADMIN — BUSPIRONE HYDROCHLORIDE 30 MG: 15 TABLET ORAL at 19:53

## 2022-04-24 RX ADMIN — CLOZAPINE 200 MG: 100 TABLET ORAL at 19:54

## 2022-04-24 RX ADMIN — DONEPEZIL HYDROCHLORIDE 10 MG: 10 TABLET ORAL at 19:54

## 2022-04-24 RX ADMIN — GABAPENTIN 100 MG: 100 CAPSULE ORAL at 08:36

## 2022-04-24 RX ADMIN — GABAPENTIN 100 MG: 100 CAPSULE ORAL at 12:28

## 2022-04-24 RX ADMIN — ASPIRIN 81 MG: 81 TABLET, COATED ORAL at 08:36

## 2022-04-24 RX ADMIN — MEMANTINE 10 MG: 10 TABLET ORAL at 19:53

## 2022-04-24 RX ADMIN — MEGESTROL ACETATE 20 MG: 20 TABLET ORAL at 08:36

## 2022-04-24 RX ADMIN — SALMETEROL XINAFOATE 1 PUFF: 50 POWDER, METERED ORAL; RESPIRATORY (INHALATION) at 08:36

## 2022-04-24 RX ADMIN — GABAPENTIN 100 MG: 100 CAPSULE ORAL at 16:57

## 2022-04-24 RX ADMIN — HALOPERIDOL 1 MG: 1 TABLET ORAL at 19:54

## 2022-04-24 RX ADMIN — LEVOTHYROXINE SODIUM 88 MCG: 0.09 TABLET ORAL at 08:36

## 2022-04-24 RX ADMIN — MIRTAZAPINE 15 MG: 15 TABLET, FILM COATED ORAL at 19:54

## 2022-04-24 RX ADMIN — Medication 5 MG: at 19:54

## 2022-04-24 RX ADMIN — MEMANTINE 10 MG: 10 TABLET ORAL at 08:36

## 2022-04-24 ASSESSMENT — ACTIVITIES OF DAILY LIVING (ADL)
ORAL_HYGIENE: INDEPENDENT;PROMPTS
LAUNDRY: UNABLE TO COMPLETE
DRESS: INDEPENDENT;PROMPTS
HYGIENE/GROOMING: INDEPENDENT;PROMPTS

## 2022-04-24 NOTE — PLAN OF CARE
"Goal Outcome Evaluation:    Plan of Care Reviewed With: patient     Patient is isolative and withdrawn. He came out of his room for dinner only. He ate 100%. His affect is flat. His mood is calm. He endorses AH stating \"I always have them\". He states he hears his mom and sisters talking to him. He denies SI and SIB. He denies pain. He denies depression and anxiety. He refused to attend groups. He is disheveled with poor hygiene. He refused to take a shower. He is alert to self only. He is not social with peers and staff. This writer made him aware that urine specimen is needed and reminded him to urinate in the cup or hat but patient became irritable with this request. No urine sample obtained thus far. He is medication compliant. No PRN's requested or needed.                 "

## 2022-04-24 NOTE — PLAN OF CARE
Patient continues to be withdrawn and isolative in his room, didn't want to came out for breakfast, ate his breakfast in his room but he did came out in the lounge for lunch. His appetite is fair. Sleep is good. His affect is flat/blunted, mood is calm. Denies all mental health symptoms. He is medication complaint. Didn't attend group as usual. Urine sample still needs to be collected, clean urine hut placed in the toilet, patient educated on the importance of collecting urine sample, this writer advised staff/psych associate to check the BR during 15 minutes check to see if he urinate so we can collect sample. Continues to monitor pt. as plan of care.

## 2022-04-24 NOTE — PLAN OF CARE
Goal Outcome Evaluation:      Patient slept well the whole night for 9.75 hours. No complaints made. Nothing unusual noted. He is still for UA/UC.

## 2022-04-25 PROCEDURE — 250N000013 HC RX MED GY IP 250 OP 250 PS 637: Performed by: PSYCHIATRY & NEUROLOGY

## 2022-04-25 PROCEDURE — 124N000003 HC R&B MH SENIOR/ADOLESCENT

## 2022-04-25 PROCEDURE — 99231 SBSQ HOSP IP/OBS SF/LOW 25: CPT | Performed by: PSYCHIATRY & NEUROLOGY

## 2022-04-25 RX ADMIN — GABAPENTIN 100 MG: 100 CAPSULE ORAL at 17:29

## 2022-04-25 RX ADMIN — Medication 5 MG: at 20:25

## 2022-04-25 RX ADMIN — DONEPEZIL HYDROCHLORIDE 10 MG: 10 TABLET ORAL at 20:25

## 2022-04-25 RX ADMIN — MIRTAZAPINE 15 MG: 15 TABLET, FILM COATED ORAL at 20:25

## 2022-04-25 RX ADMIN — ASPIRIN 81 MG: 81 TABLET, COATED ORAL at 08:27

## 2022-04-25 RX ADMIN — BUSPIRONE HYDROCHLORIDE 30 MG: 15 TABLET ORAL at 08:26

## 2022-04-25 RX ADMIN — GABAPENTIN 100 MG: 100 CAPSULE ORAL at 08:26

## 2022-04-25 RX ADMIN — MEMANTINE 10 MG: 10 TABLET ORAL at 20:25

## 2022-04-25 RX ADMIN — BUSPIRONE HYDROCHLORIDE 30 MG: 15 TABLET ORAL at 20:25

## 2022-04-25 RX ADMIN — SALMETEROL XINAFOATE 1 PUFF: 50 POWDER, METERED ORAL; RESPIRATORY (INHALATION) at 20:25

## 2022-04-25 RX ADMIN — MEGESTROL ACETATE 20 MG: 20 TABLET ORAL at 08:27

## 2022-04-25 RX ADMIN — CLOZAPINE 200 MG: 100 TABLET ORAL at 20:25

## 2022-04-25 RX ADMIN — HALOPERIDOL 1 MG: 1 TABLET ORAL at 20:25

## 2022-04-25 RX ADMIN — LEVOTHYROXINE SODIUM 88 MCG: 0.09 TABLET ORAL at 08:26

## 2022-04-25 RX ADMIN — SALMETEROL XINAFOATE 1 PUFF: 50 POWDER, METERED ORAL; RESPIRATORY (INHALATION) at 08:27

## 2022-04-25 RX ADMIN — MEMANTINE 10 MG: 10 TABLET ORAL at 08:26

## 2022-04-25 RX ADMIN — GABAPENTIN 100 MG: 100 CAPSULE ORAL at 12:25

## 2022-04-25 NOTE — PLAN OF CARE
Goal Outcome Evaluation:    Plan of Care Reviewed With: patient          Pt continues to isolate to room except for meals. Ate > 75% of dinner. Poverty of thought and speech. One-word responses. Oriented to self only. Affect flat. Poor eye contact. Hygiene poor. Gait steady. Med-compliant. Continue to encourage hydration. Continue with current treatment plan and recommendations. Continue to monitor and reassess symptoms. Monitor response to medications. Monitor progress towards treatment goals. Encourage groups and participation.

## 2022-04-25 NOTE — PLAN OF CARE
Assessment/Intervention/Current Symtoms and Care Coordination:  -Reviewed pt's record to get up to date with his current status and plan  -Rounded with team   -CTC checked in with patient.          Discharge Plan or Goal:  Discharge to [facility] when guardianship have been established and CADI waiver gets activated.  LSS is working on streamlining guardianship.     Barriers to Discharge:  History of aggression and elopement.  Lack of current guardian to make health decisions.      Referral Status:  None today  Legal: Comm/Yanez

## 2022-04-25 NOTE — PLAN OF CARE
Problem: Behavior Regulation Impairment (Psychotic Signs/Symptoms)  Goal: Improved Behavioral Control (Psychotic Signs/Symptoms)  Outcome: Ongoing, Progressing   Goal Outcome Evaluation:                Slept well for 8.75 hours  No complaints made this shift  Urine still  needs to be collected for UA/UC

## 2022-04-25 NOTE — PLAN OF CARE
He continues to be withdrawn and isolative in his room, out for meal only, ate 100% of his breakfast and lunch. His affect is flat/blunted, mood is calm. Denies SI, SIB. Denies anxiety and depression. Denies any forms of hallucination during this shift. He is medication complaint. Didn't attend group. Denies pain, VSS. Urine sample collection still pending, patient reminded to use urinal or urine hut, patient is not cooperative with that. Continues to monitor pt. as plan of care.

## 2022-04-26 PROCEDURE — 124N000003 HC R&B MH SENIOR/ADOLESCENT

## 2022-04-26 PROCEDURE — 250N000013 HC RX MED GY IP 250 OP 250 PS 637: Performed by: PSYCHIATRY & NEUROLOGY

## 2022-04-26 RX ADMIN — MEMANTINE 10 MG: 10 TABLET ORAL at 20:00

## 2022-04-26 RX ADMIN — SALMETEROL XINAFOATE 1 PUFF: 50 POWDER, METERED ORAL; RESPIRATORY (INHALATION) at 19:59

## 2022-04-26 RX ADMIN — HALOPERIDOL 1 MG: 1 TABLET ORAL at 20:00

## 2022-04-26 RX ADMIN — GABAPENTIN 100 MG: 100 CAPSULE ORAL at 13:09

## 2022-04-26 RX ADMIN — ASPIRIN 81 MG: 81 TABLET, COATED ORAL at 08:46

## 2022-04-26 RX ADMIN — LEVOTHYROXINE SODIUM 88 MCG: 0.09 TABLET ORAL at 08:46

## 2022-04-26 RX ADMIN — GABAPENTIN 100 MG: 100 CAPSULE ORAL at 08:46

## 2022-04-26 RX ADMIN — GABAPENTIN 100 MG: 100 CAPSULE ORAL at 17:27

## 2022-04-26 RX ADMIN — MEMANTINE 10 MG: 10 TABLET ORAL at 08:46

## 2022-04-26 RX ADMIN — DONEPEZIL HYDROCHLORIDE 10 MG: 10 TABLET ORAL at 20:00

## 2022-04-26 RX ADMIN — MIRTAZAPINE 15 MG: 15 TABLET, FILM COATED ORAL at 19:59

## 2022-04-26 RX ADMIN — BUSPIRONE HYDROCHLORIDE 30 MG: 15 TABLET ORAL at 19:59

## 2022-04-26 RX ADMIN — SALMETEROL XINAFOATE 1 PUFF: 50 POWDER, METERED ORAL; RESPIRATORY (INHALATION) at 08:47

## 2022-04-26 RX ADMIN — MEGESTROL ACETATE 20 MG: 20 TABLET ORAL at 08:46

## 2022-04-26 RX ADMIN — CLOZAPINE 200 MG: 100 TABLET ORAL at 19:59

## 2022-04-26 RX ADMIN — BUSPIRONE HYDROCHLORIDE 30 MG: 15 TABLET ORAL at 08:46

## 2022-04-26 RX ADMIN — Medication 5 MG: at 19:59

## 2022-04-26 ASSESSMENT — ACTIVITIES OF DAILY LIVING (ADL)
DRESS: PROMPTS
LAUNDRY: UNABLE TO COMPLETE
ORAL_HYGIENE: PROMPTS
HYGIENE/GROOMING: PROMPTS;SHOWER
LAUNDRY: UNABLE TO COMPLETE
HYGIENE/GROOMING: PROMPTS
ORAL_HYGIENE: PROMPTS
DRESS: PROMPTS;INDEPENDENT

## 2022-04-26 NOTE — PLAN OF CARE
Problem: Sleep Disturbance (Psychotic Signs/Symptoms)  Goal: Improved Sleep (Psychotic Signs/Symptoms)  Outcome: Ongoing, Progressing   Goal Outcome Evaluation:           Slept well tonight for 9.25 hours.  No complaints made.  Independent with ADL's

## 2022-04-26 NOTE — PLAN OF CARE
Goal Outcome Evaluation:    Plan of Care Reviewed With: patient     Patient withdrawn and isolative in his room, out only for breakfast and lunch. His affect is flat/blunted, mood is calm. Denies SI, SIB and hallucination. Denies anxiety and depression. Appetite and sleep is good. He is medication complaint. Fluid encouraged. Refused to go to group. Denies pain, VSS. patient refused Covid test, urine sample collection still pending, patient irritable when asked. Refused to take shower. Continues to monitor pt. as plan of care.

## 2022-04-26 NOTE — PROGRESS NOTES
PSYCHIATRY  PROGRESS NOTE     DATE OF SERVICE   04/25/2022       CHIEF COMPLAINT   Patient was admitted due to inability to safely care for himself and psychosis.       SUBJECTIVE   Nursing reports: Patient continues to be psychiatrically stable.    Patient has been discussed with the  during team meeting.   has continue to follow-up with the guardianship process.     OBJECTIVE   Patient was seen and evaluated at bedside by himself, this was a face-to-face evaluation.  Patient remains pleasantly confused, isolative, has poverty of thought and speech.  He has been observed talking to himself and when asked he is either talking to his mother or his sister that are not present during the assessment.  He continues to ask when he will be leaving the hospital but when explaining to him he does not seem to understand the information that be provided him.  Continues to denied having thoughts of harming himself on is shayan for safety.       MEDICATIONS   Medications:  Scheduled Meds:    aspirin  81 mg Oral Daily     [Held by provider] atorvastatin  80 mg Oral At Bedtime     busPIRone HCl  30 mg Oral BID     cloZAPine  200 mg Oral At Bedtime     donepezil  10 mg Oral At Bedtime     gabapentin  100 mg Oral TID w/meals     haloperidol  1 mg Oral At Bedtime     levothyroxine  88 mcg Oral Daily     megestrol  20 mg Oral Daily     melatonin  5 mg Oral At Bedtime     memantine  10 mg Oral BID     mirtazapine  15 mg Oral At Bedtime     salmeterol  1 puff Inhalation BID     Continuous Infusions:  PRN Meds:.[Held by provider] acetaminophen, albuterol, alum & mag hydroxide-simethicone, benzocaine-menthol, hydrOXYzine, nicotine, polyethylene glycol, polyethylene glycol-propylene glycol PF, risperiDONE, senna-docusate, [Held by provider] traZODone    Medication adherence issues: MS Med Adherence Y/N: Yes, Hospitalization  Medication side effects: MEDICATION SIDE EFFECTS: no side effects reported  Benefit:  "Yes / No: Yes       ROS   A comprehensive review of systems was negative.       MENTAL STATUS EXAM   Vitals: /75   Pulse 100   Temp 97.4  F (36.3  C) (Oral)   Resp 16   Ht 1.778 m (5' 10\")   Wt 76.2 kg (167 lb 14.4 oz)   SpO2 96%   BMI 24.09 kg/m      Same as last visit  Appearance:  No apparent distress  Mood: \"I am okay\"  Affect: Confused  Suicidal Ideation: denies   Homicidal Ideation: denies   Thought process: concrete  Thought content: Remains confused and delusional.    Fund of Knowledge: Below average  Attention/Concentration: Easily distracted  Language ability: Significantly impaired  Memory: Global memory impairment  Insight:  Poor.  Judgement: Poor  Orientation: Only to person  Psychomotor Behavior: slowed    Muscle Strength and Tone: MuscleStrength: Normal and Atrophy  Gait and Station: Not evaluated       LABS   personally reviewed.     No results found for: PHENYTOIN, PHENOBARB, VALPROATE, CBMZ       DIAGNOSIS   Principal Problem:    Major neurocognitive disorder, due to multiple etiologies, with behavioral disturbance, severe (H)    Active Problem List:  Patient Active Problem List   Diagnosis     TBI with aggressive behavior     HTN (hypertension)     COPD (chronic obstructive pulmonary disease) (H)     Dementia with behavioral disturbance (H)     Chronic schizophrenia (H)     Smoker     Agitation     Behavior disturbance     Psychosis (H)     Major neurocognitive disorder, due to multiple etiologies, with behavioral disturbance, severe (H)     Paranoid schizophrenia, chronic condition with acute exacerbation (H)          PLAN   1. Ongoing education given regarding diagnostic and treatment options with risks, benefits and alternatives and adequate verbalization of understanding.  2.  Medications       BuSpar 30 mg 2 times daily       Clozaril 200 mg at bedtime       Aricept 10 mg at bedtime       Gabapentin 100 mg 3 times a day       Haldol 1 mg at bedtime       Namenda 10 mg 2 times a " day       Remeron 15 mg at bedtime       melatonin 5 mg at bedtime  3.  Medical team following the patient   4.   coordinating a safe discharge plan with the patient.    Risk Assessment: [facility] RISK ASSESSMENT: Patient able to contract for safety    Coordination of Care:   Treatment Plan reviewed and physician signed, Care discussed with Care/Treatment Team Members, Chart reviewed and Patient seen      Re-Certification I certify that the inpatient psychiatric facility services furnished since the previous certification were, and continue to be, medically necessary for, either, treatment which could reasonably be expected to improve the patient s condition or diagnostic study and that the hospital records indicate that the services furnished were, either, intensive treatment services, admission and related services necessary for diagnostic study, or equivalent services.     I certify that the patient continues to need, on a daily basis, active treatment furnished directly by or requiring the supervision of inpatient psychiatric facility personnel.   I estimate 14 days of hospitalization is necessary for proper treatment of the patient. My plans for post-hospital care for this patient are  TBD     Ginger Dubon MD    -     04/25/2022  -     9:45 PM    Total time 15 minutes with > 50%spent on coordination of cares and psycho-education.    This note was created with help of Dragon dictation system. Grammatical / typing errors are not intentional.    Ginger Dubon MD

## 2022-04-27 LAB
ATRIAL RATE - MUSE: 97 BPM
DIASTOLIC BLOOD PRESSURE - MUSE: NORMAL MMHG
INTERPRETATION ECG - MUSE: NORMAL
P AXIS - MUSE: 65 DEGREES
PR INTERVAL - MUSE: 162 MS
QRS DURATION - MUSE: 114 MS
QT - MUSE: 374 MS
QTC - MUSE: 472 MS
R AXIS - MUSE: 59 DEGREES
SYSTOLIC BLOOD PRESSURE - MUSE: NORMAL MMHG
T AXIS - MUSE: 51 DEGREES
VENTRICULAR RATE- MUSE: 96 BPM

## 2022-04-27 PROCEDURE — 250N000013 HC RX MED GY IP 250 OP 250 PS 637: Performed by: PSYCHIATRY & NEUROLOGY

## 2022-04-27 PROCEDURE — 124N000003 HC R&B MH SENIOR/ADOLESCENT

## 2022-04-27 PROCEDURE — 99231 SBSQ HOSP IP/OBS SF/LOW 25: CPT | Performed by: PSYCHIATRY & NEUROLOGY

## 2022-04-27 RX ADMIN — LEVOTHYROXINE SODIUM 88 MCG: 0.09 TABLET ORAL at 08:01

## 2022-04-27 RX ADMIN — Medication 5 MG: at 19:48

## 2022-04-27 RX ADMIN — MIRTAZAPINE 15 MG: 15 TABLET, FILM COATED ORAL at 19:48

## 2022-04-27 RX ADMIN — SALMETEROL XINAFOATE 1 PUFF: 50 POWDER, METERED ORAL; RESPIRATORY (INHALATION) at 19:49

## 2022-04-27 RX ADMIN — HALOPERIDOL 1 MG: 1 TABLET ORAL at 19:48

## 2022-04-27 RX ADMIN — GABAPENTIN 100 MG: 100 CAPSULE ORAL at 17:28

## 2022-04-27 RX ADMIN — DONEPEZIL HYDROCHLORIDE 10 MG: 10 TABLET ORAL at 19:48

## 2022-04-27 RX ADMIN — SALMETEROL XINAFOATE 1 PUFF: 50 POWDER, METERED ORAL; RESPIRATORY (INHALATION) at 08:02

## 2022-04-27 RX ADMIN — ASPIRIN 81 MG: 81 TABLET, COATED ORAL at 08:01

## 2022-04-27 RX ADMIN — BUSPIRONE HYDROCHLORIDE 30 MG: 15 TABLET ORAL at 08:01

## 2022-04-27 RX ADMIN — BUSPIRONE HYDROCHLORIDE 30 MG: 15 TABLET ORAL at 19:48

## 2022-04-27 RX ADMIN — GABAPENTIN 100 MG: 100 CAPSULE ORAL at 08:02

## 2022-04-27 RX ADMIN — MEMANTINE 10 MG: 10 TABLET ORAL at 08:02

## 2022-04-27 RX ADMIN — GABAPENTIN 100 MG: 100 CAPSULE ORAL at 12:26

## 2022-04-27 RX ADMIN — CLOZAPINE 200 MG: 100 TABLET ORAL at 19:48

## 2022-04-27 RX ADMIN — MEMANTINE 10 MG: 10 TABLET ORAL at 19:49

## 2022-04-27 RX ADMIN — MEGESTROL ACETATE 20 MG: 20 TABLET ORAL at 08:02

## 2022-04-27 ASSESSMENT — ACTIVITIES OF DAILY LIVING (ADL)
LAUNDRY: UNABLE TO COMPLETE
DRESS: SCRUBS (BEHAVIORAL HEALTH)
HYGIENE/GROOMING: PROMPTS
HYGIENE/GROOMING: PROMPTS
LAUNDRY: UNABLE TO COMPLETE
ORAL_HYGIENE: PROMPTS
ORAL_HYGIENE: PROMPTS
DRESS: PROMPTS

## 2022-04-27 NOTE — PLAN OF CARE
Assessment/Intervention/Current Symtoms and Care Coordination:  -Reviewed pt's record to get up to date with his current status and plan  -Rounded with team   -CTC checked in with patient.          Discharge Plan or Goal:  Discharge to [facility] when guardianship have been established and CADI waiver gets activated.  LSS is working on streamlining guardianship.     Barriers to Discharge:  History of aggression and elopement.  Lack of current guardian to make health decisions.      Referral Status:  None today  Legal: Comm/Yanez

## 2022-04-27 NOTE — PLAN OF CARE
Problem: Behavioral Health Plan of Care  Goal: Adheres to Safety Considerations for Self and Others  Outcome: Ongoing, Progressing     Problem: Behavioral Health Plan of Care  Goal: Absence of New-Onset Illness or Injury  Intervention: Identify and Manage Fall Risk  Recent Flowsheet Documentation  Taken 4/26/2022 1848 by Dann Maldonado, RN  Safety Measures: safety rounds completed     Problem: Fall Injury Risk  Goal: Absence of Fall and Fall-Related Injury  Outcome: Ongoing, Progressing  Intervention: Identify and Manage Contributors  Recent Flowsheet Documentation  Taken 4/26/2022 1848 by Dann Maldonado, RN  Self-Care Promotion: independence encouraged  Medication Review/Management: medications reviewed  Intervention: Promote Injury-Free Environment  Recent Flowsheet Documentation  Taken 4/26/2022 1848 by Dann Maldonado, RN  Safety Promotion/Fall Prevention:    nonskid shoes/slippers when out of bed    mobility aid in reach    clutter free environment maintained    fall prevention program maintained    increase visualization of patient       Pt  calm, cooperative when approached. Denies anxiety, depression 5/10, denies hallucination, withdrawn in his room isolated self, did not join activity, refused cares, went out to the dining room to eat, ate 75% of his food. Oriented to self, affect flat, poor eye contact when talked to.  Pt denies any pain,  gait steady, urine sample needs to be collected, pt agitated when requested for a urine sample. Pt medication compliant, encouraged hydration.

## 2022-04-27 NOTE — PLAN OF CARE
Problem: Sleep Disturbance (Psychotic Signs/Symptoms)  Goal: Improved Sleep (Psychotic Signs/Symptoms)  Outcome: Ongoing, Progressing  Patient slept 6.75 hours, uninterrupted, breathing quietly during the shift.    No complaints or concerns and no behavioral issues.    Precautions: Fall  Code 1  Status 15  Comm /Yanez

## 2022-04-27 NOTE — PLAN OF CARE
Assessment/Intervention/Current Symtoms and Care Coordination:  -Reviewed pt's record to get up to date with his current status and plan  -Rounded with team   -CTC checked in with patient.          Discharge Plan or Goal:  Discharge to [facility] when guardianship have been established and CADI waiver gets activated.  LSS is working on streamlining guardianship.     Barriers to Discharge:  History of aggression and elopement.  Lack of current guardian to make health decisions.      Referral Status:  None today  Legal: Comm/Yanez

## 2022-04-27 NOTE — PLAN OF CARE
Problem: Behavioral Health Plan of Care  Goal: Adheres to Safety Considerations for Self and Others  Outcome: Ongoing, Progressing  Intervention: Develop and Maintain Individualized Safety Plan  Recent Flowsheet Documentation  Taken 4/27/2022 1658 by Dann Maldonado RN  Safety Measures: environmental rounds completed     Problem: Behavioral Health Plan of Care  Goal: Absence of New-Onset Illness or Injury  Intervention: Identify and Manage Fall Risk  Recent Flowsheet Documentation  Taken 4/27/2022 1658 by Dann Maldonado RN  Safety Measures: environmental rounds completed     Problem: Fall Injury Risk  Goal: Absence of Fall and Fall-Related Injury  Outcome: Ongoing, Progressing  Intervention: Identify and Manage Contributors  Recent Flowsheet Documentation  Taken 4/27/2022 1658 by Dann Maldonado, RN  Self-Care Promotion: independence encouraged  Medication Review/Management: medications reviewed  Intervention: Promote Injury-Free Environment  Recent Flowsheet Documentation  Taken 4/27/2022 1658 by Dann Maldonado, RN  Safety Promotion/Fall Prevention:    nonskid shoes/slippers when out of bed    mobility aid in reach    clutter free environment maintained    fall prevention program maintained    increase visualization of patient   :    Pt calm, cooperative when approached, denies SI/SIB, depression 4/10, no anxiety, denies hallucination. Voice soft, garbled hard to understand, flat affect, oriented to self, made eye contact intermittently when talked to.  Withdrawn in his room, isolated self, did not join activity, refused cares.  Pt reported no pain, ambulation slow, steady, went out to eat dinner, ate 50% of his food, drank most of his liquids.    Pt medication compliant, encouraged hydration, refused Covid swab, irritated when asked to collect urine for examination.

## 2022-04-27 NOTE — PLAN OF CARE
Goal Outcome Evaluation:    Plan of Care Reviewed With: patient     Patient continues to be withdrawn and isolative in his room, came out to the lounge for meal only. Appetite is good. Has flat/blunted affect in a calm mood. Denies all mental valencia symptoms. He is medication complaint. VSS. Denied pain. Didn't attend group. Refused Covid test, refused to take a shower. Urine sample collection is still pending. Continue to monitor patient as plan of care.

## 2022-04-27 NOTE — PLAN OF CARE
Assessment/Intervention/Current Symtoms and Care Coordination:  -Reviewed pt's record to get up to date with his current status and plan  -Rounded with team   -CTC checked in with patient.          Discharge Plan or Goal:  Discharge to [facility] when guardianship have been established and CADI waiver gets activated.  LSS is working on streamlining guardianship.     Barriers to Discharge:  History of aggression and elopement.  Lack of current guardian to make health decisions.      Referral Status:  None today  Legal: Comm/Yanez

## 2022-04-27 NOTE — PROGRESS NOTES
"PSYCHIATRY  PROGRESS NOTE     DATE OF SERVICE   04/27/2022       CHIEF COMPLAINT   Patient was admitted due to inability to safely care for himself and psychosis.       SUBJECTIVE   Nursing reports: Pt  calm, cooperative when approached. Denies anxiety, depression 5/10, denies hallucination, withdrawn in his room isolated self, did not join activity, refused cares, went out to the dining room to eat, ate 75% of his food. Oriented to self, affect flat, poor eye contact when talked to.  Pt denies any pain,  gait steady, urine sample needs to be collected, pt agitated when requested for a urine sample. Pt medication compliant, encouraged hydration.    Patient has been discussed with the  during team meeting.   informed me that there has been no updates on patient's discharge or guardianship process     OBJECTIVE   Patient was seen and evaluated at bedside by himself, this was a face-to-face evaluation.  Patient reported that he is doing \"alright\" and denies all psychiatric symptoms.  He continues to refuse to take showers.  He spends most of the day in his room and comes out only for meals.  He is oriented only to self and is extremely confused otherwise.  Continues to respond to internal stimuli.  For the most part he has been calm during his interactions with the staff.       MEDICATIONS   Medications:  Scheduled Meds:    aspirin  81 mg Oral Daily     [Held by provider] atorvastatin  80 mg Oral At Bedtime     busPIRone HCl  30 mg Oral BID     cloZAPine  200 mg Oral At Bedtime     donepezil  10 mg Oral At Bedtime     gabapentin  100 mg Oral TID w/meals     haloperidol  1 mg Oral At Bedtime     levothyroxine  88 mcg Oral Daily     megestrol  20 mg Oral Daily     melatonin  5 mg Oral At Bedtime     memantine  10 mg Oral BID     mirtazapine  15 mg Oral At Bedtime     salmeterol  1 puff Inhalation BID     Continuous Infusions:  PRN Meds:.[Held by provider] acetaminophen, albuterol, alum & mag " "hydroxide-simethicone, benzocaine-menthol, hydrOXYzine, nicotine, polyethylene glycol, polyethylene glycol-propylene glycol PF, risperiDONE, senna-docusate, [Held by provider] traZODone    Medication adherence issues: MS Med Adherence Y/N: Yes, Hospitalization  Medication side effects: MEDICATION SIDE EFFECTS: no side effects reported  Benefit: Yes / No: Yes       ROS   A comprehensive review of systems was negative.       MENTAL STATUS EXAM   Vitals: /81 (BP Location: Right arm, Patient Position: Sitting, Cuff Size: Adult Regular)   Pulse 114   Temp 97.3  F (36.3  C) (Oral)   Resp 16   Ht 1.778 m (5' 10\")   Wt 76.9 kg (169 lb 8 oz)   SpO2 98%   BMI 24.32 kg/m        Appearance:  No apparent distress  Mood: \"I am alright\"  Affect: Confused  Suicidal Ideation: denies   Homicidal Ideation: denies   Thought process: concrete  Thought content: Remains confused and delusional.    Fund of Knowledge: Below average  Attention/Concentration: Easily distracted  Language ability: Significantly impaired  Memory: Global memory impairment  Insight:  Poor.  Judgement: Poor  Orientation: Only to person  Psychomotor Behavior: slowed    Muscle Strength and Tone: MuscleStrength: Normal and Atrophy  Gait and Station: Not evaluated       LABS   personally reviewed.     No results found for: PHENYTOIN, PHENOBARB, VALPROATE, CBMZ       DIAGNOSIS   Principal Problem:    Major neurocognitive disorder, due to multiple etiologies, with behavioral disturbance, severe (H)    Active Problem List:  Patient Active Problem List   Diagnosis     TBI with aggressive behavior     HTN (hypertension)     COPD (chronic obstructive pulmonary disease) (H)     Dementia with behavioral disturbance (H)     Chronic schizophrenia (H)     Smoker     Agitation     Behavior disturbance     Psychosis (H)     Major neurocognitive disorder, due to multiple etiologies, with behavioral disturbance, severe (H)     Paranoid schizophrenia, chronic condition " with acute exacerbation (H)          PLAN   1. Ongoing education given regarding diagnostic and treatment options with risks, benefits and alternatives and adequate verbalization of understanding.  2.  Medications       BuSpar 30 mg 2 times daily       Clozaril 200 mg at bedtime       Aricept 10 mg at bedtime       Gabapentin 100 mg 3 times a day       Haldol 1 mg at bedtime       Namenda 10 mg 2 times a day       Remeron 15 mg at bedtime       melatonin 5 mg at bedtime  3.  Medical team following the patient   4.   coordinating a safe discharge plan with the patient.    Risk Assessment: [facility] RISK ASSESSMENT: Patient able to contract for safety    Coordination of Care:   Treatment Plan reviewed and physician signed, Care discussed with Care/Treatment Team Members, Chart reviewed and Patient seen      Re-Certification I certify that the inpatient psychiatric facility services furnished since the previous certification were, and continue to be, medically necessary for, either, treatment which could reasonably be expected to improve the patient s condition or diagnostic study and that the hospital records indicate that the services furnished were, either, intensive treatment services, admission and related services necessary for diagnostic study, or equivalent services.     I certify that the patient continues to need, on a daily basis, active treatment furnished directly by or requiring the supervision of inpatient psychiatric facility personnel.   I estimate 14 days of hospitalization is necessary for proper treatment of the patient. My plans for post-hospital care for this patient are  TBD     Ginger Dubon MD    -     04/27/2022  -     11:37 AM    Total time 15 minutes with > 50%spent on coordination of cares and psycho-education.    This note was created with help of Dragon dictation system. Grammatical / typing errors are not intentional.    Ginger Dubon MD

## 2022-04-28 PROCEDURE — 99221 1ST HOSP IP/OBS SF/LOW 40: CPT | Performed by: PODIATRIST

## 2022-04-28 PROCEDURE — 250N000013 HC RX MED GY IP 250 OP 250 PS 637: Performed by: PODIATRIST

## 2022-04-28 PROCEDURE — 124N000003 HC R&B MH SENIOR/ADOLESCENT

## 2022-04-28 PROCEDURE — 0HDRXZZ EXTRACTION OF TOE NAIL, EXTERNAL APPROACH: ICD-10-PCS | Performed by: PODIATRIST

## 2022-04-28 PROCEDURE — 250N000013 HC RX MED GY IP 250 OP 250 PS 637: Performed by: PSYCHIATRY & NEUROLOGY

## 2022-04-28 PROCEDURE — 99231 SBSQ HOSP IP/OBS SF/LOW 25: CPT | Performed by: PSYCHIATRY & NEUROLOGY

## 2022-04-28 RX ORDER — NYSTATIN 100000 U/G
CREAM TOPICAL 2 TIMES DAILY
Status: DISCONTINUED | OUTPATIENT
Start: 2022-04-28 | End: 2022-06-07

## 2022-04-28 RX ADMIN — GABAPENTIN 100 MG: 100 CAPSULE ORAL at 12:25

## 2022-04-28 RX ADMIN — SALMETEROL XINAFOATE 1 PUFF: 50 POWDER, METERED ORAL; RESPIRATORY (INHALATION) at 08:09

## 2022-04-28 RX ADMIN — GABAPENTIN 100 MG: 100 CAPSULE ORAL at 17:38

## 2022-04-28 RX ADMIN — MIRTAZAPINE 15 MG: 15 TABLET, FILM COATED ORAL at 19:33

## 2022-04-28 RX ADMIN — BUSPIRONE HYDROCHLORIDE 30 MG: 15 TABLET ORAL at 08:09

## 2022-04-28 RX ADMIN — MEMANTINE 10 MG: 10 TABLET ORAL at 19:33

## 2022-04-28 RX ADMIN — LEVOTHYROXINE SODIUM 88 MCG: 0.09 TABLET ORAL at 08:09

## 2022-04-28 RX ADMIN — GABAPENTIN 100 MG: 100 CAPSULE ORAL at 08:09

## 2022-04-28 RX ADMIN — CLOZAPINE 200 MG: 100 TABLET ORAL at 19:33

## 2022-04-28 RX ADMIN — Medication 5 MG: at 19:34

## 2022-04-28 RX ADMIN — MEMANTINE 10 MG: 10 TABLET ORAL at 08:09

## 2022-04-28 RX ADMIN — NYSTATIN: 100000 CREAM TOPICAL at 12:22

## 2022-04-28 RX ADMIN — ASPIRIN 81 MG: 81 TABLET, COATED ORAL at 08:09

## 2022-04-28 RX ADMIN — HALOPERIDOL 1 MG: 1 TABLET ORAL at 19:33

## 2022-04-28 RX ADMIN — MEGESTROL ACETATE 20 MG: 20 TABLET ORAL at 08:09

## 2022-04-28 RX ADMIN — SALMETEROL XINAFOATE 1 PUFF: 50 POWDER, METERED ORAL; RESPIRATORY (INHALATION) at 19:33

## 2022-04-28 RX ADMIN — DONEPEZIL HYDROCHLORIDE 10 MG: 10 TABLET ORAL at 19:33

## 2022-04-28 RX ADMIN — BUSPIRONE HYDROCHLORIDE 30 MG: 15 TABLET ORAL at 19:33

## 2022-04-28 ASSESSMENT — ACTIVITIES OF DAILY LIVING (ADL)
HYGIENE/GROOMING: PROMPTS
DRESS: SCRUBS (BEHAVIORAL HEALTH)
LAUNDRY: UNABLE TO COMPLETE
HYGIENE/GROOMING: PROMPTS
DRESS: SCRUBS (BEHAVIORAL HEALTH)
LAUNDRY: UNABLE TO COMPLETE
ORAL_HYGIENE: PROMPTS
ORAL_HYGIENE: PROMPTS

## 2022-04-28 NOTE — PLAN OF CARE
Assessment/Intervention/Current Symtoms and Care Coordination:  -Reviewed pt's record to get up to date with his current status and plan  -Rounded with team   -CTC checked in with patient.          Discharge Plan or Goal:  Discharge to [facility] when guardianship have been established and CADI waiver gets activated.  LSS is working on streamlining guardianship.     Barriers to Discharge:  History of aggression and elopement.  Lack of current guardian to make health decisions.      Referral Status:  None today  Legal: Comm/Yanez

## 2022-04-28 NOTE — CONSULTS
Consult Date: 2022    SUBJECTIVE FINDINGS:  A 56-year-old consulted to Podiatry by Ginger Bazan MD, for toenail fungus.  The patient is seen at bedside with nursing staff.  They relate that he needs his toenails and fingernails cut as they are unable to do it.  The patient resting.    OBJECTIVE FINDINGS:  DP and PT are 2/4 bilateral feet.  He has dorsally contracted digits and contractures.  He has dry, scaly skin in a moccasin pattern.  He has dystrophic, thickened, incurvated, long brittle nails with subungual debris, dystrophy, and discoloration 1 through 5 to differing degrees.  He does have partially broken off toenails on the feet.  His fingernails are long, incurvated with thickening, dystrophy and subungual debris to differing degrees.    ASSESSMENT AND PLAN:  Onychomycosis of the toenails and fingernails with onychocryptosis, tinea pedis bilaterally.  Diagnosis and treatment discussed with him.  All the fingernails and toenails were debrided or reduced bilaterally upon consent.  Six plus nails were debrided.  Prescription for nystatin cream given and use discussed with him.  Follow up with Podiatry as needed.    Deuce Jade DPM        D: 2022   T: 2022   MT: LEOBARDO    Name:     GARCIA FLOYD  MRN:      [id number]        Account:      [id number]   :      1965           Consult Date: 2022     Document: [id number]
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
## 2022-04-28 NOTE — PLAN OF CARE
Assessment/Intervention/Current Symtoms and Care Coordination:  -Reviewed pt's record to get up to date with his current status and plan  -Rounded with team   -CTC checked in with patient.          Discharge Plan or Goal:  Discharge to [facility] when guardianship have been established and CADI waiver gets activated.  LSS is working on streamlining guardianship.     Barriers to Discharge:  History of aggression and elopement.  Lack of current guardian to make health decisions.      Referral Status:  None today  Legal: Comm/Yanez

## 2022-04-28 NOTE — PLAN OF CARE
Goal Outcome Evaluation:    Plan of Care Reviewed With: patient      Patient is withdrawn and isolative in his room, out to the lounge for meal only. Has flat/blunted affect, his mood is calm. Denies all mental valencia symptoms. He is medication complaint. Sleep and appetite is good. Refused to go to the group. Continues to refuse Covid test, to take a shower and refused to provide a urine sample. Continue to monitor patient as plan of care.

## 2022-04-28 NOTE — PROGRESS NOTES
PSYCHIATRY  PROGRESS NOTE     DATE OF SERVICE   04/28/2022       CHIEF COMPLAINT   Patient was admitted due to inability to safely care for himself and psychosis.       SUBJECTIVE   Nursing reports: Pt calm, cooperative when approached, denies SI/SIB, depression 4/10, no anxiety, denies hallucination. Voice soft, garbled hard to understand, flat affect, oriented to self, made eye contact intermittently when talked to.  Withdrawn in his room, isolated self, did not join activity, refused cares.  Pt reported no pain, ambulation slow, steady, went out to eat dinner, ate 50% of his food, drank most of his liquids.    Pt medication compliant, encouraged hydration, refused Covid swab, irritated when asked to collect urine for examination.    Patient has been discussed with the  during team meeting.   informed me that there has been no updates on patient's discharge or guardianship process.     OBJECTIVE   Patient was seen and evaluated at bedside by himself, this was a face-to-face evaluation.  Patient continues to be the same and no new behaviors have been reported.  He is responding to internal stimuli but this is at his baseline.  Denies suicidal ideations and contracts for safety here in the hospital.       MEDICATIONS   Medications:  Scheduled Meds:    aspirin  81 mg Oral Daily     [Held by provider] atorvastatin  80 mg Oral At Bedtime     busPIRone HCl  30 mg Oral BID     cloZAPine  200 mg Oral At Bedtime     donepezil  10 mg Oral At Bedtime     gabapentin  100 mg Oral TID w/meals     haloperidol  1 mg Oral At Bedtime     levothyroxine  88 mcg Oral Daily     megestrol  20 mg Oral Daily     melatonin  5 mg Oral At Bedtime     memantine  10 mg Oral BID     mirtazapine  15 mg Oral At Bedtime     nystatin   Topical BID     salmeterol  1 puff Inhalation BID     Continuous Infusions:  PRN Meds:.[Held by provider] acetaminophen, albuterol, alum & mag hydroxide-simethicone, benzocaine-menthol,  "hydrOXYzine, nicotine, polyethylene glycol, polyethylene glycol-propylene glycol PF, risperiDONE, senna-docusate, [Held by provider] traZODone    Medication adherence issues: MS Med Adherence Y/N: Yes, Hospitalization  Medication side effects: MEDICATION SIDE EFFECTS: no side effects reported  Benefit: Yes / No: Yes       ROS   A comprehensive review of systems was negative.       MENTAL STATUS EXAM   Vitals: BP 97/66   Pulse 102   Temp 98.2  F (36.8  C) (Oral)   Resp 16   Ht 1.778 m (5' 10\")   Wt 78 kg (171 lb 14.4 oz)   SpO2 99%   BMI 24.67 kg/m      Same as last visit  Appearance:  No apparent distress  Mood: \"I am alright\"  Affect: Confused  Suicidal Ideation: denies   Homicidal Ideation: denies   Thought process: concrete  Thought content: Remains confused and delusional.    Fund of Knowledge: Below average  Attention/Concentration: Easily distracted  Language ability: Significantly impaired  Memory: Global memory impairment  Insight:  Poor.  Judgement: Poor  Orientation: Only to person  Psychomotor Behavior: slowed    Muscle Strength and Tone: MuscleStrength: Normal and Atrophy  Gait and Station: Not evaluated       LABS   personally reviewed.     No results found for: PHENYTOIN, PHENOBARB, VALPROATE, CBMZ       DIAGNOSIS   Principal Problem:    Major neurocognitive disorder, due to multiple etiologies, with behavioral disturbance, severe (H)    Active Problem List:  Patient Active Problem List   Diagnosis     TBI with aggressive behavior     HTN (hypertension)     COPD (chronic obstructive pulmonary disease) (H)     Dementia with behavioral disturbance (H)     Chronic schizophrenia (H)     Smoker     Agitation     Behavior disturbance     Psychosis (H)     Major neurocognitive disorder, due to multiple etiologies, with behavioral disturbance, severe (H)     Paranoid schizophrenia, chronic condition with acute exacerbation (H)          PLAN   1. Ongoing education given regarding diagnostic and treatment " options with risks, benefits and alternatives and adequate verbalization of understanding.  2.  Medications       BuSpar 30 mg 2 times daily       Clozaril 200 mg at bedtime       Aricept 10 mg at bedtime       Gabapentin 100 mg 3 times a day       Haldol 1 mg at bedtime       Namenda 10 mg 2 times a day       Remeron 15 mg at bedtime       melatonin 5 mg at bedtime  3.  Medical team following the patient   4.   coordinating a safe discharge plan with the patient.    Risk Assessment: [facility] RISK ASSESSMENT: Patient able to contract for safety    Coordination of Care:   Treatment Plan reviewed and physician signed, Care discussed with Care/Treatment Team Members, Chart reviewed and Patient seen      Re-Certification I certify that the inpatient psychiatric facility services furnished since the previous certification were, and continue to be, medically necessary for, either, treatment which could reasonably be expected to improve the patient s condition or diagnostic study and that the hospital records indicate that the services furnished were, either, intensive treatment services, admission and related services necessary for diagnostic study, or equivalent services.     I certify that the patient continues to need, on a daily basis, active treatment furnished directly by or requiring the supervision of inpatient psychiatric facility personnel.   I estimate 14 days of hospitalization is necessary for proper treatment of the patient. My plans for post-hospital care for this patient are  TBD     Ginger uDbon MD    -     04/28/2022  -     10:53 AM    Total time 15 minutes with > 50%spent on coordination of cares and psycho-education.    This note was created with help of Dragon dictation system. Grammatical / typing errors are not intentional.    Ginger Dubon MD

## 2022-04-28 NOTE — PLAN OF CARE
Assessment/Intervention/Current Symtoms and Care Coordination:  -Reviewed pt's record to get up to date with his current status and plan  -Rounded with team   -CTC checked in with patient.          Discharge Plan or Goal:  Discharge to [facility] when guardianship have been established and CADI waiver gets activated.  LSS is working on streamlining guardianship.     Barriers to Discharge:  History of aggression and elopement.  Lack of current guardian to make health decisions.      Referral Status:  None today  Legal: Comm/Yanez

## 2022-04-28 NOTE — PLAN OF CARE
Problem: Behavior Regulation Impairment (Psychotic Signs/Symptoms)  Goal: Improved Behavioral Control (Psychotic Signs/Symptoms)  Outcome: Ongoing, Progressing   Goal Outcome Evaluation:           Slept uninterrupted for 10.5 hours  No complaints made.  Independent with ADL's

## 2022-04-29 LAB
ALBUMIN UR-MCNC: 50 MG/DL
APPEARANCE UR: CLEAR
BASOPHILS # BLD AUTO: 0.1 10E3/UL (ref 0–0.2)
BASOPHILS NFR BLD AUTO: 1 %
BILIRUB UR QL STRIP: NEGATIVE
CAOX CRY #/AREA URNS HPF: ABNORMAL /HPF
COLOR UR AUTO: YELLOW
EOSINOPHIL # BLD AUTO: 1.3 10E3/UL (ref 0–0.7)
EOSINOPHIL NFR BLD AUTO: 20 %
ERYTHROCYTE [DISTWIDTH] IN BLOOD BY AUTOMATED COUNT: 14 % (ref 10–15)
GLUCOSE UR STRIP-MCNC: NEGATIVE MG/DL
HCT VFR BLD AUTO: 42.4 % (ref 40–53)
HGB BLD-MCNC: 14.7 G/DL (ref 13.3–17.7)
HGB UR QL STRIP: NEGATIVE
HOLD SPECIMEN: NORMAL
IMM GRANULOCYTES # BLD: 0.1 10E3/UL
IMM GRANULOCYTES NFR BLD: 1 %
KETONES UR STRIP-MCNC: NEGATIVE MG/DL
LEUKOCYTE ESTERASE UR QL STRIP: NEGATIVE
LYMPHOCYTES # BLD AUTO: 1.7 10E3/UL (ref 0.8–5.3)
LYMPHOCYTES NFR BLD AUTO: 25 %
MCH RBC QN AUTO: 31.7 PG (ref 26.5–33)
MCHC RBC AUTO-ENTMCNC: 34.7 G/DL (ref 31.5–36.5)
MCV RBC AUTO: 91 FL (ref 78–100)
MONOCYTES # BLD AUTO: 0.6 10E3/UL (ref 0–1.3)
MONOCYTES NFR BLD AUTO: 9 %
MUCOUS THREADS #/AREA URNS LPF: PRESENT /LPF
NEUTROPHILS # BLD AUTO: 3 10E3/UL (ref 1.6–8.3)
NEUTROPHILS NFR BLD AUTO: 44 %
NITRATE UR QL: NEGATIVE
NRBC # BLD AUTO: 0 10E3/UL
NRBC BLD AUTO-RTO: 0 /100
PH UR STRIP: 6.5 [PH] (ref 5–7)
PLATELET # BLD AUTO: 176 10E3/UL (ref 150–450)
RBC # BLD AUTO: 4.64 10E6/UL (ref 4.4–5.9)
RBC URINE: 3 /HPF
SP GR UR STRIP: 1.03 (ref 1–1.03)
SQUAMOUS EPITHELIAL: <1 /HPF
TRANSITIONAL EPI: <1 /HPF
UROBILINOGEN UR STRIP-MCNC: NORMAL MG/DL
WBC # BLD AUTO: 6.7 10E3/UL (ref 4–11)
WBC URINE: 3 /HPF

## 2022-04-29 PROCEDURE — 99231 SBSQ HOSP IP/OBS SF/LOW 25: CPT | Performed by: PSYCHIATRY & NEUROLOGY

## 2022-04-29 PROCEDURE — 250N000013 HC RX MED GY IP 250 OP 250 PS 637: Performed by: PSYCHIATRY & NEUROLOGY

## 2022-04-29 PROCEDURE — 124N000003 HC R&B MH SENIOR/ADOLESCENT

## 2022-04-29 PROCEDURE — 81001 URINALYSIS AUTO W/SCOPE: CPT | Performed by: PSYCHIATRY & NEUROLOGY

## 2022-04-29 PROCEDURE — 36415 COLL VENOUS BLD VENIPUNCTURE: CPT | Performed by: PSYCHIATRY & NEUROLOGY

## 2022-04-29 PROCEDURE — 85025 COMPLETE CBC W/AUTO DIFF WBC: CPT | Performed by: PSYCHIATRY & NEUROLOGY

## 2022-04-29 RX ADMIN — HALOPERIDOL 1 MG: 1 TABLET ORAL at 20:43

## 2022-04-29 RX ADMIN — CLOZAPINE 200 MG: 100 TABLET ORAL at 20:43

## 2022-04-29 RX ADMIN — Medication 5 MG: at 20:43

## 2022-04-29 RX ADMIN — GABAPENTIN 100 MG: 100 CAPSULE ORAL at 17:36

## 2022-04-29 RX ADMIN — GABAPENTIN 100 MG: 100 CAPSULE ORAL at 12:29

## 2022-04-29 RX ADMIN — NYSTATIN: 100000 CREAM TOPICAL at 08:15

## 2022-04-29 RX ADMIN — MEMANTINE 10 MG: 10 TABLET ORAL at 20:43

## 2022-04-29 RX ADMIN — SALMETEROL XINAFOATE 1 PUFF: 50 POWDER, METERED ORAL; RESPIRATORY (INHALATION) at 20:43

## 2022-04-29 RX ADMIN — GABAPENTIN 100 MG: 100 CAPSULE ORAL at 08:14

## 2022-04-29 RX ADMIN — MEMANTINE 10 MG: 10 TABLET ORAL at 08:14

## 2022-04-29 RX ADMIN — ASPIRIN 81 MG: 81 TABLET, COATED ORAL at 08:14

## 2022-04-29 RX ADMIN — MEGESTROL ACETATE 20 MG: 20 TABLET ORAL at 08:14

## 2022-04-29 RX ADMIN — MIRTAZAPINE 15 MG: 15 TABLET, FILM COATED ORAL at 20:43

## 2022-04-29 RX ADMIN — SALMETEROL XINAFOATE 1 PUFF: 50 POWDER, METERED ORAL; RESPIRATORY (INHALATION) at 08:15

## 2022-04-29 RX ADMIN — LEVOTHYROXINE SODIUM 88 MCG: 0.09 TABLET ORAL at 08:14

## 2022-04-29 RX ADMIN — BUSPIRONE HYDROCHLORIDE 30 MG: 15 TABLET ORAL at 20:42

## 2022-04-29 RX ADMIN — DONEPEZIL HYDROCHLORIDE 10 MG: 10 TABLET ORAL at 20:43

## 2022-04-29 RX ADMIN — BUSPIRONE HYDROCHLORIDE 30 MG: 15 TABLET ORAL at 08:14

## 2022-04-29 ASSESSMENT — ACTIVITIES OF DAILY LIVING (ADL)
HYGIENE/GROOMING: WITH ASSISTANCE;PROMPTS
DRESS: PROMPTS;WITH SUPERVISION
ORAL_HYGIENE: PROMPTS;WITH SUPERVISION
HYGIENE/GROOMING: WITH ASSISTANCE
DRESS: WITH ASSISTANCE;PROMPTS
ORAL_HYGIENE: WITH ASSISTANCE;PROMPTS

## 2022-04-29 NOTE — PLAN OF CARE
"Goal Outcome Evaluation:    Plan of Care Reviewed With: patient      Hygiene refusal         Nursing Assessment    Psychosis (H) [F29]    Admit Date: 1/26/2022    Length of Stay: 93    Patient evaluation continues. Assessed mood,anxiety,thoughts and behavior. Patient is  progressing towards goals but declines often to shower or get washed up. Patient is encouraged to participate in groups and assisted to develop healthy coping skills.  Patient is having auditory hallucinations talking to his sister who is not present. BP (!) 126/90   Pulse 115   Temp 98.4  F (36.9  C) (Temporal)   Resp 17   Ht 1.778 m (5' 10\")   Wt 78 kg (171 lb 14.4 oz)   SpO2 97%   BMI 24.67 kg/m      Mood: \"ok\".    Patient reports depression none and reports anxiety little    Affect:flat blunted    Sleep: 9 1/2 hours    Appetite: good    SI: denies    HI: denies    SIB: denies      Medication Compliance: yes    Group participation: infrequent     ADL's: assist with shower as pt allows, needs positive reinforcement. Pt refused.    Pt refused covid swab and would not urinate in hat in bathroom, urinal was provided.    Fall risk interventions: proper foot wear, reorientation, and reminders. Orthostatic B/P    Rinku Score Interventions: none    Discharge planning in process    Refer to daily team meeting notes for individualized plan of care. Nursing will continue to assess.    *Scale is 1-10 and 10 is the worst.         " none

## 2022-04-29 NOTE — PLAN OF CARE
Assessment/Intervention/Current Symtoms and Care Coordination:  -Reviewed pt's record to get up to date with his current status and plan  -Rounded with team   -CTC checked in with patient.          Discharge Plan or Goal:  Discharge to [facility] when guardianship have been established and CADI waiver gets activated.  LSS is working on streamlining guardianship.     Barriers to Discharge:  History of aggression and elopement.  Lack of current guardian to make health decisions.      Referral Status:  None today  Legal: Comm/Yanez

## 2022-04-29 NOTE — PLAN OF CARE
Problem: Behavioral Health Plan of Care  Goal: Adheres to Safety Considerations for Self and Others  Outcome: Ongoing, Progressing     Pt calm, cooperative when approached, denies SI/SIB, depression 5/10, no anxiety, denies hallucination, flat affect, withdrawn in his room, isolated self, refused cares.   Pt reported no pain, ambulation soft, steady, came out for dinner, ate 50% of his food, did not join activities. Pt medication compliant, still for Covid swab, no urine specimen collected.

## 2022-04-29 NOTE — PLAN OF CARE
Goal Outcome Evaluation:    Plan of Care Reviewed With: patient        Pt continues to isolate to room except for meals. Ate > 75% of dinner. Poverty of thought and speech. One-word responses. Oriented to self only. Affect flat. Poor eye contact. Hygiene poor. Gait steady. Med-compliant. Continue to encourage hydration. Refused to allow collection of COVID swab.Provided urine sample for UA. Continue with current treatment plan and recommendations. Continue to monitor and reassess symptoms. Monitor response to medications. Monitor progress towards treatment goals. Encourage groups and participation

## 2022-04-29 NOTE — PLAN OF CARE
Problem: Sleep Disturbance (Psychotic Signs/Symptoms)  Goal: Improved Sleep (Psychotic Signs/Symptoms)  Outcome: Ongoing, Progressing   Goal Outcome Evaluation:                    Slept uninterrupted for 9.5 hours  No complaints/ concerns raised this shift.  Independent with ADL's

## 2022-04-29 NOTE — PROGRESS NOTES
PSYCHIATRY  PROGRESS NOTE     DATE OF SERVICE   04/29/2022       CHIEF COMPLAINT   Patient was admitted due to inability to safely care for himself and psychosis.       SUBJECTIVE   Nursing reports:   Pt calm, cooperative when approached, denies SI/SIB, depression 5/10, no anxiety, denies hallucination, flat affect, withdrawn in his room, isolated self, refused cares.   Pt reported no pain, ambulation soft, steady, came out for dinner, ate 50% of his food, did not join activities. Pt medication compliant, still for Covid swab, no urine specimen collected.     Patient has been discussed with the  during team meeting.  Patient will discharge to a nursing home once guardianship is approved.     OBJECTIVE   Patient was seen and evaluated at bedside by himself, this was a face-to-face evaluation.  Patient remains at his baseline and no new behaviors have been reported.  He is refusing to take a shower.  Denies having any thoughts of harming himself.  He continues to respond to internal stimuli (this is at his baseline).       MEDICATIONS   Medications:  Scheduled Meds:    aspirin  81 mg Oral Daily     [Held by provider] atorvastatin  80 mg Oral At Bedtime     busPIRone HCl  30 mg Oral BID     cloZAPine  200 mg Oral At Bedtime     donepezil  10 mg Oral At Bedtime     gabapentin  100 mg Oral TID w/meals     haloperidol  1 mg Oral At Bedtime     levothyroxine  88 mcg Oral Daily     megestrol  20 mg Oral Daily     melatonin  5 mg Oral At Bedtime     memantine  10 mg Oral BID     mirtazapine  15 mg Oral At Bedtime     nystatin   Topical BID     salmeterol  1 puff Inhalation BID     Continuous Infusions:  PRN Meds:.[Held by provider] acetaminophen, albuterol, alum & mag hydroxide-simethicone, benzocaine-menthol, hydrOXYzine, nicotine, polyethylene glycol, polyethylene glycol-propylene glycol PF, risperiDONE, senna-docusate, [Held by provider] traZODone    Medication adherence issues: MS Med Adherence Y/N: Yes,  "Hospitalization  Medication side effects: MEDICATION SIDE EFFECTS: no side effects reported  Benefit: Yes / No: Yes       ROS   A comprehensive review of systems was negative.       MENTAL STATUS EXAM   Vitals: BP (!) 126/90   Pulse 115   Temp 98.4  F (36.9  C) (Temporal)   Resp 17   Ht 1.778 m (5' 10\")   Wt 78 kg (171 lb 14.4 oz)   SpO2 97%   BMI 24.67 kg/m      Same as last visit  Appearance:  No apparent distress  Mood: \"I am alright\"  Affect: Confused  Suicidal Ideation: denies   Homicidal Ideation: denies   Thought process: concrete  Thought content: Remains confused and delusional.    Fund of Knowledge: Below average  Attention/Concentration: Easily distracted  Language ability: Significantly impaired  Memory: Global memory impairment  Insight:  Poor.  Judgement: Poor  Orientation: Only to person  Psychomotor Behavior: slowed    Muscle Strength and Tone: MuscleStrength: Normal and Atrophy  Gait and Station: Not evaluated       LABS   personally reviewed.     No results found for: PHENYTOIN, PHENOBARB, VALPROATE, CBMZ       DIAGNOSIS   Principal Problem:    Major neurocognitive disorder, due to multiple etiologies, with behavioral disturbance, severe (H)    Active Problem List:  Patient Active Problem List   Diagnosis     TBI with aggressive behavior     HTN (hypertension)     COPD (chronic obstructive pulmonary disease) (H)     Dementia with behavioral disturbance (H)     Chronic schizophrenia (H)     Smoker     Agitation     Behavior disturbance     Psychosis (H)     Major neurocognitive disorder, due to multiple etiologies, with behavioral disturbance, severe (H)     Paranoid schizophrenia, chronic condition with acute exacerbation (H)          PLAN   1. Ongoing education given regarding diagnostic and treatment options with risks, benefits and alternatives and adequate verbalization of understanding.  2.  Medications       BuSpar 30 mg 2 times daily       Clozaril 200 mg at bedtime       Aricept 10 " mg at bedtime       Gabapentin 100 mg 3 times a day       Haldol 1 mg at bedtime       Namenda 10 mg 2 times a day       Remeron 15 mg at bedtime       melatonin 5 mg at bedtime  3.  Medical team following the patient   4.   coordinating a safe discharge plan with the patient.    Risk Assessment: [facility] RISK ASSESSMENT: Patient able to contract for safety    Coordination of Care:   Treatment Plan reviewed and physician signed, Care discussed with Care/Treatment Team Members, Chart reviewed and Patient seen      Re-Certification I certify that the inpatient psychiatric facility services furnished since the previous certification were, and continue to be, medically necessary for, either, treatment which could reasonably be expected to improve the patient s condition or diagnostic study and that the hospital records indicate that the services furnished were, either, intensive treatment services, admission and related services necessary for diagnostic study, or equivalent services.     I certify that the patient continues to need, on a daily basis, active treatment furnished directly by or requiring the supervision of inpatient psychiatric facility personnel.   I estimate 14 days of hospitalization is necessary for proper treatment of the patient. My plans for post-hospital care for this patient are  TBD     Ginger Dubon MD    -     04/29/2022  -     3:29 PM    Total time 15 minutes with > 50%spent on coordination of cares and psycho-education.    This note was created with help of Dragon dictation system. Grammatical / typing errors are not intentional.    Ginger Dubon MD

## 2022-04-30 PROCEDURE — 250N000013 HC RX MED GY IP 250 OP 250 PS 637: Performed by: PSYCHIATRY & NEUROLOGY

## 2022-04-30 PROCEDURE — 124N000003 HC R&B MH SENIOR/ADOLESCENT

## 2022-04-30 RX ADMIN — Medication 5 MG: at 21:07

## 2022-04-30 RX ADMIN — NYSTATIN: 100000 CREAM TOPICAL at 09:09

## 2022-04-30 RX ADMIN — HALOPERIDOL 1 MG: 1 TABLET ORAL at 21:07

## 2022-04-30 RX ADMIN — CLOZAPINE 200 MG: 100 TABLET ORAL at 21:07

## 2022-04-30 RX ADMIN — LEVOTHYROXINE SODIUM 88 MCG: 0.09 TABLET ORAL at 09:07

## 2022-04-30 RX ADMIN — ASPIRIN 81 MG: 81 TABLET, COATED ORAL at 09:07

## 2022-04-30 RX ADMIN — DONEPEZIL HYDROCHLORIDE 10 MG: 10 TABLET ORAL at 21:07

## 2022-04-30 RX ADMIN — MEGESTROL ACETATE 20 MG: 20 TABLET ORAL at 09:09

## 2022-04-30 RX ADMIN — GABAPENTIN 100 MG: 100 CAPSULE ORAL at 12:59

## 2022-04-30 RX ADMIN — MEMANTINE 10 MG: 10 TABLET ORAL at 09:07

## 2022-04-30 RX ADMIN — BUSPIRONE HYDROCHLORIDE 30 MG: 15 TABLET ORAL at 21:07

## 2022-04-30 RX ADMIN — GABAPENTIN 100 MG: 100 CAPSULE ORAL at 17:28

## 2022-04-30 RX ADMIN — MEMANTINE 10 MG: 10 TABLET ORAL at 21:07

## 2022-04-30 RX ADMIN — SALMETEROL XINAFOATE 1 PUFF: 50 POWDER, METERED ORAL; RESPIRATORY (INHALATION) at 21:08

## 2022-04-30 RX ADMIN — GABAPENTIN 100 MG: 100 CAPSULE ORAL at 09:09

## 2022-04-30 RX ADMIN — MIRTAZAPINE 15 MG: 15 TABLET, FILM COATED ORAL at 21:07

## 2022-04-30 RX ADMIN — SALMETEROL XINAFOATE 1 PUFF: 50 POWDER, METERED ORAL; RESPIRATORY (INHALATION) at 09:09

## 2022-04-30 RX ADMIN — BUSPIRONE HYDROCHLORIDE 30 MG: 15 TABLET ORAL at 09:07

## 2022-04-30 ASSESSMENT — ACTIVITIES OF DAILY LIVING (ADL)
DRESS: PROMPTS
ORAL_HYGIENE: PROMPTS
HYGIENE/GROOMING: WITH ASSISTANCE

## 2022-04-30 NOTE — PLAN OF CARE
Problem: Sleep Disturbance (Psychotic Signs/Symptoms)  Goal: Improved Sleep (Psychotic Signs/Symptoms)  Outcome: Ongoing, Progressing   Goal Outcome Evaluation:             Slept well tonight and remained in bed all night. Able to reposition self  in bed. No concerns/complaints made this shift.  Slept for 10.5 hours

## 2022-05-01 PROCEDURE — 250N000013 HC RX MED GY IP 250 OP 250 PS 637: Performed by: PSYCHIATRY & NEUROLOGY

## 2022-05-01 PROCEDURE — 124N000003 HC R&B MH SENIOR/ADOLESCENT

## 2022-05-01 RX ADMIN — BUSPIRONE HYDROCHLORIDE 30 MG: 15 TABLET ORAL at 21:00

## 2022-05-01 RX ADMIN — GABAPENTIN 100 MG: 100 CAPSULE ORAL at 13:04

## 2022-05-01 RX ADMIN — LEVOTHYROXINE SODIUM 88 MCG: 0.09 TABLET ORAL at 09:46

## 2022-05-01 RX ADMIN — MEGESTROL ACETATE 20 MG: 20 TABLET ORAL at 09:46

## 2022-05-01 RX ADMIN — ASPIRIN 81 MG: 81 TABLET, COATED ORAL at 09:47

## 2022-05-01 RX ADMIN — GABAPENTIN 100 MG: 100 CAPSULE ORAL at 09:47

## 2022-05-01 RX ADMIN — GABAPENTIN 100 MG: 100 CAPSULE ORAL at 18:11

## 2022-05-01 RX ADMIN — Medication 5 MG: at 21:00

## 2022-05-01 RX ADMIN — NYSTATIN: 100000 CREAM TOPICAL at 21:01

## 2022-05-01 RX ADMIN — MIRTAZAPINE 15 MG: 15 TABLET, FILM COATED ORAL at 21:00

## 2022-05-01 RX ADMIN — NYSTATIN: 100000 CREAM TOPICAL at 09:48

## 2022-05-01 RX ADMIN — DONEPEZIL HYDROCHLORIDE 10 MG: 10 TABLET ORAL at 21:00

## 2022-05-01 RX ADMIN — SALMETEROL XINAFOATE 1 PUFF: 50 POWDER, METERED ORAL; RESPIRATORY (INHALATION) at 09:47

## 2022-05-01 RX ADMIN — BUSPIRONE HYDROCHLORIDE 30 MG: 15 TABLET ORAL at 09:47

## 2022-05-01 RX ADMIN — HALOPERIDOL 1 MG: 1 TABLET ORAL at 21:00

## 2022-05-01 RX ADMIN — CLOZAPINE 200 MG: 100 TABLET ORAL at 21:00

## 2022-05-01 RX ADMIN — MEMANTINE 10 MG: 10 TABLET ORAL at 09:47

## 2022-05-01 RX ADMIN — SALMETEROL XINAFOATE 1 PUFF: 50 POWDER, METERED ORAL; RESPIRATORY (INHALATION) at 21:01

## 2022-05-01 RX ADMIN — MEMANTINE 10 MG: 10 TABLET ORAL at 21:00

## 2022-05-01 ASSESSMENT — ACTIVITIES OF DAILY LIVING (ADL)
ORAL_HYGIENE: WITH ASSISTANCE;PROMPTS
HYGIENE/GROOMING: WITH ASSISTANCE
DRESS: INDEPENDENT;PROMPTS

## 2022-05-01 NOTE — PLAN OF CARE
"Goal Outcome Evaluation:    Plan of Care Reviewed With: patient               Nursing Assessment    Psychosis (H) [F29]    Admit Date: 1/26/2022    Length of Stay: 95    Patient evaluation continues. Assessed mood,anxiety,thoughts and behavior. Patient is progressing towards goals behavioral wise. Pt does spend much of the shift resting in his room. Pt states he feels very tired. Pt was out for breakfast but very shortly.  Patient is encouraged to participate in groups and assisted to develop healthy coping skills. Pt refuses to come out except for meals. Patient admits to auditory hallucinations. /82 (BP Location: Right arm)   Pulse 69   Temp 97.4  F (36.3  C)   Resp 17   Ht 1.778 m (5' 10\")   Wt 77.7 kg (171 lb 6.4 oz)   SpO2 98%   BMI 24.59 kg/m      Mood: down    Patient reports depression as none and reports anxiety pt did not respond to question asked.    Affect: flat blunted    Sleep: good-excellent    Appetite: good    SI: denies    HI: denies    SIB: denies      Medication Compliance medication complaint    Group participation:none refuses    ADL's: assisted as needed often refuses    Fall risk interventions: proper foot wear, orthostatic B/P clean room    Rinku Score Interventions:none    Discharge planning in process    Refer to daily team meeting notes for individualized plan of care. Nursing will continue to assess.    *Scale is 1-10 and 10 is the worst.         "

## 2022-05-01 NOTE — PLAN OF CARE
Problem: Psychomotor Impairment (Psychotic Signs/Symptoms)  Goal: Improved Psychomotor Symptoms (Psychotic Signs/Symptoms)  Outcome: Ongoing, Progressing   Goal Outcome Evaluation:             Slept well tonight for 8.5 hours, independently turned to sides  No concerns this shift.

## 2022-05-01 NOTE — PLAN OF CARE
Goal Outcome Evaluation:    Plan of Care Reviewed With: patient        Pt continues to isolate to room except for meals. Ate > 75% of dinner. Poverty of thought and speech. One-word responses. Oriented to self only. Affect flat. Poor eye contact. Hygiene adequate, allowed assistance with shower earlier today. Gait steady. Med-compliant. Continue to encourage hydration. Refused to allow collection of COVID swab. Continue with current treatment plan and recommendations. Continue to monitor and reassess symptoms. Monitor response to medications. Monitor progress towards treatment goals. Encourage groups and participation

## 2022-05-02 PROCEDURE — 124N000003 HC R&B MH SENIOR/ADOLESCENT

## 2022-05-02 PROCEDURE — 250N000013 HC RX MED GY IP 250 OP 250 PS 637: Performed by: PSYCHIATRY & NEUROLOGY

## 2022-05-02 PROCEDURE — 99231 SBSQ HOSP IP/OBS SF/LOW 25: CPT | Performed by: PSYCHIATRY & NEUROLOGY

## 2022-05-02 RX ADMIN — GABAPENTIN 100 MG: 100 CAPSULE ORAL at 09:24

## 2022-05-02 RX ADMIN — HALOPERIDOL 1 MG: 1 TABLET ORAL at 20:28

## 2022-05-02 RX ADMIN — LEVOTHYROXINE SODIUM 88 MCG: 0.09 TABLET ORAL at 09:24

## 2022-05-02 RX ADMIN — CLOZAPINE 200 MG: 100 TABLET ORAL at 20:28

## 2022-05-02 RX ADMIN — SALMETEROL XINAFOATE 1 PUFF: 50 POWDER, METERED ORAL; RESPIRATORY (INHALATION) at 20:29

## 2022-05-02 RX ADMIN — MIRTAZAPINE 15 MG: 15 TABLET, FILM COATED ORAL at 20:28

## 2022-05-02 RX ADMIN — GABAPENTIN 100 MG: 100 CAPSULE ORAL at 17:48

## 2022-05-02 RX ADMIN — GABAPENTIN 100 MG: 100 CAPSULE ORAL at 12:37

## 2022-05-02 RX ADMIN — BUSPIRONE HYDROCHLORIDE 30 MG: 15 TABLET ORAL at 20:28

## 2022-05-02 RX ADMIN — BUSPIRONE HYDROCHLORIDE 30 MG: 15 TABLET ORAL at 09:25

## 2022-05-02 RX ADMIN — MEMANTINE 10 MG: 10 TABLET ORAL at 09:24

## 2022-05-02 RX ADMIN — SALMETEROL XINAFOATE 1 PUFF: 50 POWDER, METERED ORAL; RESPIRATORY (INHALATION) at 09:25

## 2022-05-02 RX ADMIN — MEMANTINE 10 MG: 10 TABLET ORAL at 20:28

## 2022-05-02 RX ADMIN — MEGESTROL ACETATE 20 MG: 20 TABLET ORAL at 09:24

## 2022-05-02 RX ADMIN — ASPIRIN 81 MG: 81 TABLET, COATED ORAL at 09:24

## 2022-05-02 RX ADMIN — Medication 5 MG: at 20:28

## 2022-05-02 RX ADMIN — DONEPEZIL HYDROCHLORIDE 10 MG: 10 TABLET ORAL at 20:28

## 2022-05-02 ASSESSMENT — ACTIVITIES OF DAILY LIVING (ADL)
DRESS: INDEPENDENT;PROMPTS
HYGIENE/GROOMING: WITH ASSISTANCE
ORAL_HYGIENE: INDEPENDENT;PROMPTS
LAUNDRY: UNABLE TO COMPLETE

## 2022-05-02 NOTE — PLAN OF CARE
"  Problem: Behavior Regulation Impairment (Psychotic Signs/Symptoms)  Goal: Improved Behavioral Control (Psychotic Signs/Symptoms)  5/1/2022 2217 by Wen Broderick, RN  Outcome: Ongoing, Progressing  5/1/2022 2217 by Wen Broderick, RN  Outcome: Ongoing, Progressing   Goal Outcome Evaluation:    Plan of Care Reviewed With: patient        Nursing Assessment    Psychosis (H) [F29]    Admit Date: 1/26/2022    Length of Stay: 95    Patient evaluation continues. Assessed mood,anxiety,thoughts and behavior. Patient is progressing towards goals. Patient is encouraged to participate in groups and assisted to develop healthy coping skills.  Patient is having auditory  hallucinations. /84   Pulse 91   Temp 97.9  F (36.6  C) (Oral)   Resp 17   Ht 1.778 m (5' 10\")   Wt 77.7 kg (171 lb 6.4 oz)   SpO2 97%   BMI 24.59 kg/m      Mood: good    Patient reports depression none and reports anxiety none    Affect: flat blunted    Sleep: good    Appetite: good    SI: denies    HI: denies    SIB: denies      Medication Compliance Yes    Group participation: no    ADL's: encourage     Fall risk interventions: proper foot wear, orthostatic B/p    Rinku Score Interventions:none    Discharge planning in process    Refer to daily team meeting notes for individualized plan of care. Nursing will continue to assess.    *Scale is 1-10 and 10 is the worst.                "

## 2022-05-02 NOTE — PLAN OF CARE
05/02/22 1344   Individualization/Patient Specific Goals   Patient Personal Strengths resilient   Patient Vulnerabilities poor physical health;lacks insight into illness;other (see comments)   Interprofessional Rounds   Summary Patient accepted at [facility] pending CADI activation and finalization of guardianship process.  Awaiting word on court dates for guardianship at this time.   Participants CTC;nursing;psychiatrist;OT   Team Discussion   Participants Dr. Dubon; Gaviota Acosta RN; Joi Stearns OT; Winnie Cao and Maile Wan CTCs   Progress Patient is at baseline   Anticipated length of stay 3-5 weeks   Continued Stay Criteria/Rationale guardianship/placement funding not established   Plan Discharge to [facility]   Rationale for change in precautions or plan No change   Anticipated Discharge Disposition group home;assisted living     PRECAUTIONS AND SAFETY    Behavioral Orders   Procedures    Code 1 - Restrict to Unit    Code 2     CT scan only    Fall precautions    Routine Programming     As clinically indicated    Status 15     Every 15 minutes.       Safety  Safety WDL: WDL  Patient Location: patient room, own  Observed Behavior: calm  Observed Behavior (Comment): staring at fish tank  Safety Measures: environmental rounds completed, safety rounds completed, suicide check-in completed  Diversional Activity: music  Suicidality: Status 15  Assault: status 15  Elopement: Statements about wanting to leave  Elopement: status 15  Sexual: status 15

## 2022-05-02 NOTE — PROGRESS NOTES
PSYCHIATRY  PROGRESS NOTE     DATE OF SERVICE   05/02/2022       CHIEF COMPLAINT   Patient was admitted due to inability to safely care for himself and psychosis.       SUBJECTIVE   Nursing reports:   Patient withdrawn and isolative, came out for meal only. Appetite is fair. Has flat/blunted affect. His mood is calm mostly, gets agitated when asked to do things and during assessment. Denies all mental valencia symptoms. Denies pain. He is medication complaint. VSS. Didn't attend group. He is not social with peers and staff. Continue to monitor patient as plan of care.     Patient has been discussed with the  during team meeting.  Patient will discharge to a nursing home once guardianship is approved.     OBJECTIVE   Patient was seen and evaluated at bedside by himself, this was a face-to-face evaluation.  Patient reported that he is feeling tired today and this is the reason why he does not want to leave his room.  I offered the patient to get him more snacks if he agrees to come out and at least sit in the day area and watch TV.  Patient reported that he is not interested and asked this writer to leave the room.    I have observed that the patient continuing to respond to internal stimuli.  He denied feeling dizzy, having pain or any other issues.       MEDICATIONS   Medications:  Scheduled Meds:    aspirin  81 mg Oral Daily     [Held by provider] atorvastatin  80 mg Oral At Bedtime     busPIRone HCl  30 mg Oral BID     cloZAPine  200 mg Oral At Bedtime     donepezil  10 mg Oral At Bedtime     gabapentin  100 mg Oral TID w/meals     haloperidol  1 mg Oral At Bedtime     levothyroxine  88 mcg Oral Daily     megestrol  20 mg Oral Daily     melatonin  5 mg Oral At Bedtime     memantine  10 mg Oral BID     mirtazapine  15 mg Oral At Bedtime     nystatin   Topical BID     salmeterol  1 puff Inhalation BID     Continuous Infusions:  PRN Meds:.[Held by provider] acetaminophen, albuterol, alum & mag  "hydroxide-simethicone, benzocaine-menthol, hydrOXYzine, nicotine, polyethylene glycol, polyethylene glycol-propylene glycol PF, risperiDONE, senna-docusate, [Held by provider] traZODone    Medication adherence issues: MS Med Adherence Y/N: Yes, Hospitalization  Medication side effects: MEDICATION SIDE EFFECTS: no side effects reported  Benefit: Yes / No: Yes       ROS   A comprehensive review of systems was negative.       MENTAL STATUS EXAM   Vitals: BP 94/64   Pulse 86   Temp 97.9  F (36.6  C) (Oral)   Resp 17   Ht 1.778 m (5' 10\")   Wt 77.7 kg (171 lb 6.4 oz)   SpO2 97%   BMI 24.59 kg/m      Appearance:  No apparent distress  Mood: \"I am tired\"  Affect: Confused  Suicidal Ideation: denies   Homicidal Ideation: denies   Thought process: concrete  Thought content: Remains confused and delusional.  He is responding to internal stimuli.  Fund of Knowledge: Below average  Attention/Concentration: Easily distracted  Language ability: Significantly impaired  Memory: Global memory impairment  Insight:  Poor.  Judgement: Poor  Orientation: Only to person  Psychomotor Behavior: slowed    Muscle Strength and Tone: MuscleStrength: Normal and Atrophy  Gait and Station: Not evaluated       LABS   personally reviewed.     No results found for: PHENYTOIN, PHENOBARB, VALPROATE, CBMZ       DIAGNOSIS   Principal Problem:    Major neurocognitive disorder, due to multiple etiologies, with behavioral disturbance, severe (H)    Active Problem List:  Patient Active Problem List   Diagnosis     TBI with aggressive behavior     HTN (hypertension)     COPD (chronic obstructive pulmonary disease) (H)     Dementia with behavioral disturbance (H)     Chronic schizophrenia (H)     Smoker     Agitation     Behavior disturbance     Psychosis (H)     Major neurocognitive disorder, due to multiple etiologies, with behavioral disturbance, severe (H)     Paranoid schizophrenia, chronic condition with acute exacerbation (H)          PLAN   1. " Ongoing education given regarding diagnostic and treatment options with risks, benefits and alternatives and adequate verbalization of understanding.  2.  Medications       BuSpar 30 mg 2 times daily       Clozaril 200 mg at bedtime       Aricept 10 mg at bedtime       Gabapentin 100 mg 3 times a day       Haldol 1 mg at bedtime       Namenda 10 mg 2 times a day       Remeron 15 mg at bedtime       melatonin 5 mg at bedtime  3.  Medical team following the patient   4.   coordinating a safe discharge plan with the patient.    Risk Assessment: Health system RISK ASSESSMENT: Patient able to contract for safety    Coordination of Care:   Treatment Plan reviewed and physician signed, Care discussed with Care/Treatment Team Members, Chart reviewed and Patient seen      Re-Certification I certify that the inpatient psychiatric facility services furnished since the previous certification were, and continue to be, medically necessary for, either, treatment which could reasonably be expected to improve the patient s condition or diagnostic study and that the hospital records indicate that the services furnished were, either, intensive treatment services, admission and related services necessary for diagnostic study, or equivalent services.     I certify that the patient continues to need, on a daily basis, active treatment furnished directly by or requiring the supervision of inpatient psychiatric facility personnel.   I estimate 14 days of hospitalization is necessary for proper treatment of the patient. My plans for post-hospital care for this patient are  TBD     Ginger Dubon MD    -     05/02/2022  -     2:50 PM    Total time 15 minutes with > 50%spent on coordination of cares and psycho-education.    This note was created with help of Dragon dictation system. Grammatical / typing errors are not intentional.    Ginger Dubon MD

## 2022-05-02 NOTE — PLAN OF CARE
Goal Outcome Evaluation:    Plan of Care Reviewed With: patient     Patient withdrawn and isolative, came out for meal only. Appetite is fair. Has flat/blunted affect. His mood is calm mostly, gets agitated when asked to do things and during assessment. Denies all mental valencia symptoms. Denies pain. He is medication complaint. VSS. Didn't attend group. He is not social with peers and staff. Continue to monitor patient as plan of care.

## 2022-05-03 LAB — SARS-COV-2 RNA RESP QL NAA+PROBE: NEGATIVE

## 2022-05-03 PROCEDURE — 80159 DRUG ASSAY CLOZAPINE: CPT | Performed by: PSYCHIATRY & NEUROLOGY

## 2022-05-03 PROCEDURE — 87635 SARS-COV-2 COVID-19 AMP PRB: CPT | Performed by: PSYCHIATRY & NEUROLOGY

## 2022-05-03 PROCEDURE — 250N000013 HC RX MED GY IP 250 OP 250 PS 637: Performed by: PSYCHIATRY & NEUROLOGY

## 2022-05-03 PROCEDURE — 124N000003 HC R&B MH SENIOR/ADOLESCENT

## 2022-05-03 PROCEDURE — 99231 SBSQ HOSP IP/OBS SF/LOW 25: CPT | Mod: GT | Performed by: PSYCHIATRY & NEUROLOGY

## 2022-05-03 PROCEDURE — 36415 COLL VENOUS BLD VENIPUNCTURE: CPT | Performed by: PSYCHIATRY & NEUROLOGY

## 2022-05-03 RX ADMIN — GABAPENTIN 100 MG: 100 CAPSULE ORAL at 12:43

## 2022-05-03 RX ADMIN — HALOPERIDOL 1 MG: 1 TABLET ORAL at 20:22

## 2022-05-03 RX ADMIN — MEMANTINE 10 MG: 10 TABLET ORAL at 20:24

## 2022-05-03 RX ADMIN — SALMETEROL XINAFOATE 1 PUFF: 50 POWDER, METERED ORAL; RESPIRATORY (INHALATION) at 08:16

## 2022-05-03 RX ADMIN — NYSTATIN: 100000 CREAM TOPICAL at 20:25

## 2022-05-03 RX ADMIN — SALMETEROL XINAFOATE 1 PUFF: 50 POWDER, METERED ORAL; RESPIRATORY (INHALATION) at 20:25

## 2022-05-03 RX ADMIN — GABAPENTIN 100 MG: 100 CAPSULE ORAL at 17:18

## 2022-05-03 RX ADMIN — ASPIRIN 81 MG: 81 TABLET, COATED ORAL at 08:14

## 2022-05-03 RX ADMIN — MEMANTINE 10 MG: 10 TABLET ORAL at 08:14

## 2022-05-03 RX ADMIN — BUSPIRONE HYDROCHLORIDE 30 MG: 15 TABLET ORAL at 08:14

## 2022-05-03 RX ADMIN — LEVOTHYROXINE SODIUM 88 MCG: 0.09 TABLET ORAL at 08:15

## 2022-05-03 RX ADMIN — Medication 5 MG: at 20:22

## 2022-05-03 RX ADMIN — GABAPENTIN 100 MG: 100 CAPSULE ORAL at 08:14

## 2022-05-03 RX ADMIN — DONEPEZIL HYDROCHLORIDE 10 MG: 10 TABLET ORAL at 20:22

## 2022-05-03 RX ADMIN — BUSPIRONE HYDROCHLORIDE 30 MG: 15 TABLET ORAL at 20:22

## 2022-05-03 RX ADMIN — MEGESTROL ACETATE 20 MG: 20 TABLET ORAL at 08:14

## 2022-05-03 RX ADMIN — CLOZAPINE 200 MG: 100 TABLET ORAL at 20:23

## 2022-05-03 RX ADMIN — MIRTAZAPINE 15 MG: 15 TABLET, FILM COATED ORAL at 20:22

## 2022-05-03 ASSESSMENT — ACTIVITIES OF DAILY LIVING (ADL)
DRESS: INDEPENDENT
DRESS: INDEPENDENT
LAUNDRY: UNABLE TO COMPLETE
ORAL_HYGIENE: INDEPENDENT
HYGIENE/GROOMING: WITH ASSISTANCE
HYGIENE/GROOMING: PROMPTS
ORAL_HYGIENE: INDEPENDENT

## 2022-05-03 NOTE — PLAN OF CARE
"  Problem: Behavior Regulation Impairment (Psychotic Signs/Symptoms)  Goal: Improved Behavioral Control (Psychotic Signs/Symptoms)  Outcome: Ongoing, Progressing     Problem: Depression  Goal: Improved Mood  Outcome: Ongoing, Progressing     Problem: Suicidal Behavior  Goal: Suicidal Behavior is Absent or Managed  Outcome: Ongoing, Progressing     Problem: Fall Injury Risk  Goal: Absence of Fall and Fall-Related Injury  Outcome: Ongoing, Progressing  Intervention: Promote Injury-Free Environment  Recent Flowsheet Documentation  Taken 5/2/2022 1909 by Sandie Lewis RN  Safety Promotion/Fall Prevention:    clutter free environment maintained    increase visualization of patient   Goal Outcome Evaluation:    Plan of Care Reviewed With: patient           Patient is withdrawn and isolative to room. Patient comes out for only meals. Blunted and flat affects. Cooperative with nursing assessment.     Patient denies all mental health symptoms. Patient encouraged to attend group, asks, \"Can I smoke a cigarette in the group?\" Patient informed that there is no smoking anywhere in the unit.  Patient offered nicotine lozenge but declines.    Vital signs stable, denies pain.    Medication compliant.    Fair appetite consumed 100% of dinner, declined snack.    Staff will continue to offer support and monitor patient closely.      "

## 2022-05-03 NOTE — PLAN OF CARE
"  Problem: Decreased Participation and Engagement (Psychotic Signs/Symptoms)  Goal: Increased Participation and Engagement (Psychotic Signs/Symptoms)  Outcome: Ongoing, Progressing   Goal Outcome Evaluation:  Pt is out for meals in [facility]. Escorted from room to seated position and back to room as he is a high fall risk. He is compliant. He is non-compliant with check in, garbled speech saying \"this is bull shit\".  However, as quick as he gets frustrated, he quickly calms. He appears to not like to answer questions about his mental health. He has declined his ADLs and groups. This is normal behavior. He tends to only attend BINGO and showers on weekends with the assist of certain male staff. He is medication compliant and otherwise does not cause any commotion.     Refuses to have lotion on his feet.   Plan of Care Reviewed With: patient                 "

## 2022-05-03 NOTE — PLAN OF CARE
Goal Outcome Evaluation:    Plan of Care Reviewed With: patient     Patient out for dinner, otherwise isolates to his room.  Brief answers to questions.  States he has no complaints or concerns at this time.

## 2022-05-03 NOTE — PROGRESS NOTES
"PSYCHIATRY  PROGRESS NOTE     DATE OF SERVICE   05/03/2022  The patient is a 56 year old male who is being evaluated via a video billable telemedicine visit. The patient/guardian has consented to being seen via telemedicine. The provider was in front of a computer in a home office. The patient was on the inpatient unit at [facility].     Start time: 2:45 PM   Stop time: 2:53 PM    The patient/guardian has been notified of the following:     This telemedicine visit is conducted live between you and your clinician. We have found that certain health care needs can be provided without the need for a physical exam. This service lets us provide the care you need with a telemedicine conversation.           CHIEF COMPLAINT   Patient was admitted due to inability to safely care for himself and psychosis.       SUBJECTIVE   Nursing reports:   Pt is out for meals in [facility]. Escorted from room to seated position and back to room as he is a high fall risk. He is compliant. He is non-compliant with check in, garbled speech saying \"this is bull shit\".  However, as quick as he gets frustrated, he quickly calms. He appears to not like to answer questions about his mental health. He has declined his ADLs and groups. This is normal behavior. He tends to only attend BINGO and showers on weekends with the assist of certain male staff. He is medication compliant and otherwise does not cause any commotion.      Refuses to have lotion on his feet.   Plan of Care Reviewed With: patient     Patient has been discussed with the  during team meeting.  Patient will discharge to a nursing home once guardianship is approved.     OBJECTIVE   Patient was seen and evaluated at bedside by himself, this was dome with the use of telehealth. Patient insisted that someone is coming to pick him up today and will take [place] to his mansion. As per patient he has \"oil and diamonds\" and he doesn't need to stay in the hospital. Despite " "redirecting the patient he doesn't seemed to have any insight or understanding of the information provided to him.       MEDICATIONS   Medications:  Scheduled Meds:    aspirin  81 mg Oral Daily     [Held by provider] atorvastatin  80 mg Oral At Bedtime     busPIRone HCl  30 mg Oral BID     cloZAPine  200 mg Oral At Bedtime     donepezil  10 mg Oral At Bedtime     gabapentin  100 mg Oral TID w/meals     haloperidol  1 mg Oral At Bedtime     levothyroxine  88 mcg Oral Daily     megestrol  20 mg Oral Daily     melatonin  5 mg Oral At Bedtime     memantine  10 mg Oral BID     mirtazapine  15 mg Oral At Bedtime     nystatin   Topical BID     salmeterol  1 puff Inhalation BID     Continuous Infusions:  PRN Meds:.[Held by provider] acetaminophen, albuterol, alum & mag hydroxide-simethicone, benzocaine-menthol, hydrOXYzine, nicotine, polyethylene glycol, polyethylene glycol-propylene glycol PF, risperiDONE, senna-docusate, [Held by provider] traZODone    Medication adherence issues: MS Med Adherence Y/N: Yes, Hospitalization  Medication side effects: MEDICATION SIDE EFFECTS: no side effects reported  Benefit: Yes / No: Yes       ROS   A comprehensive review of systems was negative.       MENTAL STATUS EXAM   Vitals: /81 (BP Location: Right arm, Patient Position: Supine)   Pulse 99   Temp 98.3  F (36.8  C) (Oral)   Resp 16   Ht 1.778 m (5' 10\")   Wt 77.7 kg (171 lb 6.4 oz)   SpO2 99%   BMI 24.59 kg/m      Appearance:  No apparent distress  Mood: \"I am leaving \"  Affect: Confused  Suicidal Ideation: denies   Homicidal Ideation: denies   Thought process: concrete  Thought content: Remains confused and delusional.  He is responding to internal stimuli.  Fund of Knowledge: Below average  Attention/Concentration: Easily distracted  Language ability: Significantly impaired  Memory: Global memory impairment  Insight:  Poor.  Judgement: Poor  Orientation: Only to person  Psychomotor Behavior: slowed    Muscle Strength " and Tone: MuscleStrength: Normal and Atrophy  Gait and Station: Not evaluated       LABS   personally reviewed.     No results found for: PHENYTOIN, PHENOBARB, VALPROATE, CBMZ       DIAGNOSIS   Principal Problem:    Major neurocognitive disorder, due to multiple etiologies, with behavioral disturbance, severe (H)    Active Problem List:  Patient Active Problem List   Diagnosis     TBI with aggressive behavior     HTN (hypertension)     COPD (chronic obstructive pulmonary disease) (H)     Dementia with behavioral disturbance (H)     Chronic schizophrenia (H)     Smoker     Agitation     Behavior disturbance     Psychosis (H)     Major neurocognitive disorder, due to multiple etiologies, with behavioral disturbance, severe (H)     Paranoid schizophrenia, chronic condition with acute exacerbation (H)          PLAN   1. Ongoing education given regarding diagnostic and treatment options with risks, benefits and alternatives and adequate verbalization of understanding.  2.  Medications       BuSpar 30 mg 2 times daily       Clozaril 200 mg at bedtime       Aricept 10 mg at bedtime       Gabapentin 100 mg 3 times a day       Haldol 1 mg at bedtime       Namenda 10 mg 2 times a day       Remeron 15 mg at bedtime       melatonin 5 mg at bedtime  3.  Medical team following the patient   4.   coordinating a safe discharge plan with the patient.    Risk Assessment: [facility] RISK ASSESSMENT: Patient able to contract for safety    Coordination of Care:   Treatment Plan reviewed and physician signed, Care discussed with Care/Treatment Team Members, Chart reviewed and Patient seen      Re-Certification I certify that the inpatient psychiatric facility services furnished since the previous certification were, and continue to be, medically necessary for, either, treatment which could reasonably be expected to improve the patient s condition or diagnostic study and that the hospital records indicate that the services  furnished were, either, intensive treatment services, admission and related services necessary for diagnostic study, or equivalent services.     I certify that the patient continues to need, on a daily basis, active treatment furnished directly by or requiring the supervision of inpatient psychiatric facility personnel.   I estimate 14 days of hospitalization is necessary for proper treatment of the patient. My plans for post-hospital care for this patient are  TBD     Ginger Dubon MD    -     05/03/2022  -     6:33 PM    Total time 15 minutes with > 50%spent on coordination of cares and psycho-education.    This note was created with help of Dragon dictation system. Grammatical / typing errors are not intentional.    Ginger Dubon MD

## 2022-05-03 NOTE — PLAN OF CARE
Problem: Sleep Disturbance (Psychotic Signs/Symptoms)  Goal: Improved Sleep (Psychotic Signs/Symptoms)  Outcome: Met   Goal Outcome Evaluation:    Patient slept a total of 7.5 hours. Went to the bathroom once during the night. No behavioral issues noted the whole shift.

## 2022-05-04 PROCEDURE — 250N000013 HC RX MED GY IP 250 OP 250 PS 637: Performed by: PSYCHIATRY & NEUROLOGY

## 2022-05-04 PROCEDURE — 124N000003 HC R&B MH SENIOR/ADOLESCENT

## 2022-05-04 RX ADMIN — HALOPERIDOL 1 MG: 1 TABLET ORAL at 20:04

## 2022-05-04 RX ADMIN — SALMETEROL XINAFOATE 1 PUFF: 50 POWDER, METERED ORAL; RESPIRATORY (INHALATION) at 08:39

## 2022-05-04 RX ADMIN — DONEPEZIL HYDROCHLORIDE 10 MG: 10 TABLET ORAL at 20:04

## 2022-05-04 RX ADMIN — CLOZAPINE 200 MG: 100 TABLET ORAL at 20:04

## 2022-05-04 RX ADMIN — MEMANTINE 10 MG: 10 TABLET ORAL at 20:04

## 2022-05-04 RX ADMIN — NYSTATIN: 100000 CREAM TOPICAL at 19:26

## 2022-05-04 RX ADMIN — LEVOTHYROXINE SODIUM 88 MCG: 0.09 TABLET ORAL at 08:23

## 2022-05-04 RX ADMIN — MEMANTINE 10 MG: 10 TABLET ORAL at 08:23

## 2022-05-04 RX ADMIN — Medication 5 MG: at 19:18

## 2022-05-04 RX ADMIN — ASPIRIN 81 MG: 81 TABLET, COATED ORAL at 08:24

## 2022-05-04 RX ADMIN — GABAPENTIN 100 MG: 100 CAPSULE ORAL at 08:23

## 2022-05-04 RX ADMIN — GABAPENTIN 100 MG: 100 CAPSULE ORAL at 12:26

## 2022-05-04 RX ADMIN — MIRTAZAPINE 15 MG: 15 TABLET, FILM COATED ORAL at 20:04

## 2022-05-04 RX ADMIN — BUSPIRONE HYDROCHLORIDE 30 MG: 15 TABLET ORAL at 20:04

## 2022-05-04 RX ADMIN — GABAPENTIN 100 MG: 100 CAPSULE ORAL at 19:04

## 2022-05-04 RX ADMIN — BUSPIRONE HYDROCHLORIDE 30 MG: 15 TABLET ORAL at 08:23

## 2022-05-04 RX ADMIN — SALMETEROL XINAFOATE 1 PUFF: 50 POWDER, METERED ORAL; RESPIRATORY (INHALATION) at 19:04

## 2022-05-04 RX ADMIN — MEGESTROL ACETATE 20 MG: 20 TABLET ORAL at 08:23

## 2022-05-04 ASSESSMENT — ACTIVITIES OF DAILY LIVING (ADL)
DRESS: INDEPENDENT
ORAL_HYGIENE: INDEPENDENT
HYGIENE/GROOMING: PROMPTS
ORAL_HYGIENE: INDEPENDENT
LAUNDRY: UNABLE TO COMPLETE
HYGIENE/GROOMING: PROMPTS
DRESS: INDEPENDENT

## 2022-05-04 NOTE — PLAN OF CARE
Goal Outcome Evaluation:  Pt remains isolative, out for meals only. Eats and drinks well. Medication compliant. No c/o. Oriented to person only. Thought he was at [facility]. Refused nystatin cream to his feet, quickly covered his feet with blanket.    Plan of Care Reviewed With: patient        Problem: Social, Occupational or Functional Impairment (Psychotic Signs/Symptoms)  Goal: Enhanced Social, Occupational or Functional Skills (Psychotic Signs/Symptoms)  Outcome: Ongoing, Progressing  Intervention: Promote Social, Occupational and Functional Ability  Recent Flowsheet Documentation  Taken 5/4/2022 1020 by Daphne Bernstein, RN  Trust Relationship/Rapport:    care explained    choices provided    emotional support provided    empathic listening provided    questions answered    questions encouraged    reassurance provided    thoughts/feelings acknowledged

## 2022-05-04 NOTE — PLAN OF CARE
Goal Outcome Evaluation:    Plan of Care Reviewed With: patient     Isolates to his room. Only goes to group when they are having bingo.  Appetite has been good.  Oriented to person only.  Admits to having some depression and anxiety but unable to rate it.  Denies SI.  Med compliant.  P:  Continue same plan of care.

## 2022-05-04 NOTE — PLAN OF CARE
Problem: Sleep Disturbance (Psychotic Signs/Symptoms)  Goal: Improved Sleep (Psychotic Signs/Symptoms)  Outcome: Met   Goal Outcome Evaluation       Patient slept a total of 10.75 hours without any interruptions. No behavioral concerns noted the whole shift.

## 2022-05-04 NOTE — PLAN OF CARE
Assessment/Intervention/Current Symtoms and Care Coordination:  -Reviewed pt's record to get up to date with his current status and plan  -Rounded with team   -CTC checked in with patient.          Discharge Plan or Goal:  Discharge to [facility] when guardianship have been established and CADI waiver gets activated.  LSS is working on streamlining guardianship.     Barriers to Discharge:  History of aggression and elopement.  Lack of current guardian to make health decisions.      Referral Status:  None today  Legal: Comm/[place]

## 2022-05-05 PROCEDURE — 99231 SBSQ HOSP IP/OBS SF/LOW 25: CPT | Mod: GT | Performed by: PSYCHIATRY & NEUROLOGY

## 2022-05-05 PROCEDURE — 250N000013 HC RX MED GY IP 250 OP 250 PS 637: Performed by: PSYCHIATRY & NEUROLOGY

## 2022-05-05 PROCEDURE — 124N000003 HC R&B MH SENIOR/ADOLESCENT

## 2022-05-05 RX ADMIN — Medication 5 MG: at 19:46

## 2022-05-05 RX ADMIN — NYSTATIN: 100000 CREAM TOPICAL at 07:58

## 2022-05-05 RX ADMIN — SALMETEROL XINAFOATE 1 PUFF: 50 POWDER, METERED ORAL; RESPIRATORY (INHALATION) at 19:47

## 2022-05-05 RX ADMIN — SALMETEROL XINAFOATE 1 PUFF: 50 POWDER, METERED ORAL; RESPIRATORY (INHALATION) at 07:58

## 2022-05-05 RX ADMIN — GABAPENTIN 100 MG: 100 CAPSULE ORAL at 12:21

## 2022-05-05 RX ADMIN — HALOPERIDOL 1 MG: 1 TABLET ORAL at 19:46

## 2022-05-05 RX ADMIN — BUSPIRONE HYDROCHLORIDE 30 MG: 15 TABLET ORAL at 19:46

## 2022-05-05 RX ADMIN — GABAPENTIN 100 MG: 100 CAPSULE ORAL at 17:24

## 2022-05-05 RX ADMIN — DONEPEZIL HYDROCHLORIDE 10 MG: 10 TABLET ORAL at 19:46

## 2022-05-05 RX ADMIN — MEGESTROL ACETATE 20 MG: 20 TABLET ORAL at 07:58

## 2022-05-05 RX ADMIN — BUSPIRONE HYDROCHLORIDE 30 MG: 15 TABLET ORAL at 07:58

## 2022-05-05 RX ADMIN — GABAPENTIN 100 MG: 100 CAPSULE ORAL at 07:58

## 2022-05-05 RX ADMIN — NYSTATIN: 100000 CREAM TOPICAL at 19:47

## 2022-05-05 RX ADMIN — MIRTAZAPINE 15 MG: 15 TABLET, FILM COATED ORAL at 19:46

## 2022-05-05 RX ADMIN — LEVOTHYROXINE SODIUM 88 MCG: 0.09 TABLET ORAL at 07:58

## 2022-05-05 RX ADMIN — MEMANTINE 10 MG: 10 TABLET ORAL at 19:46

## 2022-05-05 RX ADMIN — ASPIRIN 81 MG: 81 TABLET, COATED ORAL at 07:58

## 2022-05-05 RX ADMIN — MEMANTINE 10 MG: 10 TABLET ORAL at 07:58

## 2022-05-05 RX ADMIN — CLOZAPINE 200 MG: 100 TABLET ORAL at 19:46

## 2022-05-05 NOTE — PROGRESS NOTES
"Patient was seen and evaluated at bedside with the use of telehealth.  Patient continues to reported that he is doing \"all right\".  Said that he does not want to take showers because he is too tired.  Patient also insisted that he is leaving the hospital this evening but he cannot tell me how he is going to do this.  At this time patient remains at his baseline and no medication changes have been made over the last few months.    Gingermegan Dubon MD   "

## 2022-05-05 NOTE — PLAN OF CARE
"  Problem: Behavior Regulation Impairment (Psychotic Signs/Symptoms)  Goal: Improved Behavioral Control (Psychotic Signs/SympNursing Assessment    Psychosis (H) [F29]    Admit Date: 1/26/2022    Length of Stay: 99    Patient evaluation continues. Assessed mood,anxiety,thoughts and behavior. Patient is  progressing towards goals. Patient is encouraged to participate in groups and assisted to develop healthy coping skills.  Patient is having auditory hallucinations. /74   Pulse 86   Temp 97.5  F (36.4  C)   Resp 16   Ht 1.778 m (5' 10\")   Wt 77.7 kg (171 lb 6.4 oz)   SpO2 99%   BMI 24.59 kg/m      Mood: ok    Patient reports depression 0/10  and reports anxiety 0/10    Affect:flay blunted    Sleep: good    Appetite: good    SI: denies    HI: denies    SIB: denies      Medication Compliance yes    Group participation:no    ADL's: remind and encourage, pt refuses    Fall risk interventions: proper footwear, orthostatic B/p    Rinku Score Interventions:none    Discharge planning in process    Refer to daily team meeting notes for individualized plan of care. Nursing will continue to assess.    *Scale is 1-10 and 10 is the worst.       toms)  5/5/2022 1530 by Wen Broderick, RN  Outcome: Met  Flowsheets (Taken 5/5/2022 1530)  Mutually Determined Action Steps (Improved Behavioral Control): verbalizes gratifying activity  5/5/2022 1529 by Wen Broderick, RN  Flowsheets (Taken 5/5/2022 1529)  Mutually Determined Action Steps (Improved Behavioral Control): verbalizes gratifying activity   Goal Outcome Evaluation:                      "

## 2022-05-05 NOTE — PLAN OF CARE
Problem: Sleep Disturbance (Psychotic Signs/Symptoms)  Goal: Improved Sleep (Psychotic Signs/Symptoms)  Outcome: Ongoing, Progressing   Goal Outcome Evaluation:    Patient slept a total of 10.75 hours. No behavioral issues raised the whole shift.

## 2022-05-05 NOTE — PLAN OF CARE
Assessment/Intervention/Current Symtoms and Care Coordination:  -Reviewed pt's record to get up to date with his current status and plan  -Rounded with team   -CTC checked in with patient.          Discharge Plan or Goal:  Discharge to [facility] when guardianship have been established and CADI waiver gets activated.  LSS is working on streamlining guardianship.     Barriers to Discharge:  History of aggression and elopement.  Lack of current guardian to make health decisions.      Referral Status:  None today  Legal: Comm/[place]anez

## 2022-05-06 PROCEDURE — 250N000013 HC RX MED GY IP 250 OP 250 PS 637: Performed by: PSYCHIATRY & NEUROLOGY

## 2022-05-06 PROCEDURE — 99231 SBSQ HOSP IP/OBS SF/LOW 25: CPT | Mod: GT | Performed by: PSYCHIATRY & NEUROLOGY

## 2022-05-06 PROCEDURE — 124N000003 HC R&B MH SENIOR/ADOLESCENT

## 2022-05-06 RX ADMIN — ASPIRIN 81 MG: 81 TABLET, COATED ORAL at 09:44

## 2022-05-06 RX ADMIN — BUSPIRONE HYDROCHLORIDE 30 MG: 15 TABLET ORAL at 09:45

## 2022-05-06 RX ADMIN — BUSPIRONE HYDROCHLORIDE 30 MG: 15 TABLET ORAL at 19:22

## 2022-05-06 RX ADMIN — DONEPEZIL HYDROCHLORIDE 10 MG: 10 TABLET ORAL at 19:22

## 2022-05-06 RX ADMIN — MIRTAZAPINE 15 MG: 15 TABLET, FILM COATED ORAL at 19:22

## 2022-05-06 RX ADMIN — MEMANTINE 10 MG: 10 TABLET ORAL at 19:22

## 2022-05-06 RX ADMIN — HALOPERIDOL 1 MG: 1 TABLET ORAL at 19:22

## 2022-05-06 RX ADMIN — SALMETEROL XINAFOATE 1 PUFF: 50 POWDER, METERED ORAL; RESPIRATORY (INHALATION) at 12:28

## 2022-05-06 RX ADMIN — NYSTATIN: 100000 CREAM TOPICAL at 19:21

## 2022-05-06 RX ADMIN — SALMETEROL XINAFOATE 1 PUFF: 50 POWDER, METERED ORAL; RESPIRATORY (INHALATION) at 19:21

## 2022-05-06 RX ADMIN — GABAPENTIN 100 MG: 100 CAPSULE ORAL at 19:22

## 2022-05-06 RX ADMIN — CLOZAPINE 200 MG: 100 TABLET ORAL at 19:22

## 2022-05-06 RX ADMIN — GABAPENTIN 100 MG: 100 CAPSULE ORAL at 09:45

## 2022-05-06 RX ADMIN — GABAPENTIN 100 MG: 100 CAPSULE ORAL at 12:29

## 2022-05-06 RX ADMIN — MEGESTROL ACETATE 20 MG: 20 TABLET ORAL at 09:45

## 2022-05-06 RX ADMIN — LEVOTHYROXINE SODIUM 88 MCG: 0.09 TABLET ORAL at 09:46

## 2022-05-06 RX ADMIN — MEMANTINE 10 MG: 10 TABLET ORAL at 09:46

## 2022-05-06 RX ADMIN — Medication 5 MG: at 19:22

## 2022-05-06 ASSESSMENT — ACTIVITIES OF DAILY LIVING (ADL)
HYGIENE/GROOMING: WITH ASSISTANCE;PROMPTS
DRESS: SCRUBS (BEHAVIORAL HEALTH)
ORAL_HYGIENE: WITH ASSISTANCE

## 2022-05-06 NOTE — PLAN OF CARE
Problem: Behavioral Health Plan of Care  Goal: Plan of Care Review  Outcome: Ongoing, Progressing  Flowsheets (Taken 5/5/2022 1700)  Plan of Care Reviewed With: patient  Patient Agreement with Plan of Care: agrees     Problem: Behavioral Health Plan of Care  Goal: Patient-Specific Goal (Individualization)  Outcome: Ongoing, Progressing   Goal Outcome Evaluation:    Plan of Care Reviewed With: patient     Patient presents with calm blunted mood, affect is flat. Patient is isolative and withdrawn. Patient did not attend the group, he denies SI, SIB, HI, and AVH. Patient denies pain, no depression and anxiety. Patient's meal consumption was adequate and med-compliant. No medication side effects observed or endorsed by patient. Will continue to monitor.

## 2022-05-06 NOTE — PLAN OF CARE
Care coordination:  -Patient care was discussed in team.  -Continue to wait for guardianship process.  - Met with patient. He appeared calm, flat affect, asked for orange juice. Writer gave patient two containers of orange juice. Patient was thankful.     Barriers to discharge:   - Guardianship legal process.     Referrals:  - None today.

## 2022-05-06 NOTE — PLAN OF CARE
Problem: Depression  Goal: Improved Mood  Outcome: Ongoing, Not Progressing   Goal Outcome Evaluation:    Patient slept a total of 9.75 hours. No behavioral issues raised the whole shift.

## 2022-05-06 NOTE — PROGRESS NOTES
"PSYCHIATRY  PROGRESS NOTE     DATE OF SERVICE   05/06/2022  The patient is a 56 year old male who is being evaluated via a video billable telemedicine visit. The patient/guardian has consented to being seen via telemedicine. The provider was in front of a computer in a home office. The patient was on the inpatient unit at [facility].    Start time: 2:19 PM   Stop time: 2:27 PM    The patient/guardian has been notified of the following:     This telemedicine visit is conducted live between you and your clinician. We have found that certain health care needs can be provided without the need for a physical exam. This service lets us provide the care you need with a telemedicine conversation.           CHIEF COMPLAINT   Patient was admitted due to inability to safely care for himself and psychosis.       SUBJECTIVE   Nursing reports: Patient remains at his baseline and no new behaviors have been reported.    Patient has been discussed with the  during team meeting.  Patient will discharge to a nursing home once guardianship is approved.     OBJECTIVE   Patient was seen and evaluated in the consult room by himself, this was done with the use of telehealth.  This is the first time the patient has agreed to meet with this writer outside his room.  Patient seemed to be more engaged in the conversation today.  He continues to say that he is feeling \"all right\".  Remains delusional thinking that he has a mansion that he can move into.  Today he did agreed to spend more time outside his room and watch TV.  During my meeting with the patient he seemed easily distracted and responding to internal stimuli.  He continues to denied having thoughts of harming himself and is shayan for safety.     MEDICATIONS   Medications:  Scheduled Meds:    aspirin  81 mg Oral Daily     [Held by provider] atorvastatin  80 mg Oral At Bedtime     busPIRone HCl  30 mg Oral BID     cloZAPine  200 mg Oral At Bedtime     " "donepezil  10 mg Oral At Bedtime     gabapentin  100 mg Oral TID w/meals     haloperidol  1 mg Oral At Bedtime     levothyroxine  88 mcg Oral Daily     megestrol  20 mg Oral Daily     melatonin  5 mg Oral At Bedtime     memantine  10 mg Oral BID     mirtazapine  15 mg Oral At Bedtime     nystatin   Topical BID     salmeterol  1 puff Inhalation BID     Continuous Infusions:  PRN Meds:.[Held by provider] acetaminophen, albuterol, alum & mag hydroxide-simethicone, benzocaine-menthol, hydrOXYzine, nicotine, polyethylene glycol, polyethylene glycol-propylene glycol PF, risperiDONE, senna-docusate, [Held by provider] traZODone    Medication adherence issues: MS Med Adherence Y/N: Yes, Hospitalization  Medication side effects: MEDICATION SIDE EFFECTS: no side effects reported  Benefit: Yes / No: Yes       ROS   A comprehensive review of systems was negative.       MENTAL STATUS EXAM   Vitals: /74 (BP Location: Right arm, Patient Position: Sitting, Cuff Size: Adult Small)   Pulse 108   Temp 98.8  F (37.1  C) (Temporal)   Resp 16   Ht 1.778 m (5' 10\")   Wt 77.7 kg (171 lb 6.4 oz)   SpO2 98%   BMI 24.59 kg/m      Appearance:  No apparent distress  Mood: \"I am alright.\"  Affect: Calm and pleasant  Suicidal Ideation: denies   Homicidal Ideation: denies   Thought process: concrete  Thought content: Remains confused and delusional.  He is responding to internal stimuli.  Fund of Knowledge: Below average  Attention/Concentration: Easily distracted  Language ability: Significantly impaired  Memory: Global memory impairment  Insight:  Poor.  Judgement: Poor  Orientation: Only to person  Psychomotor Behavior: slowed    Muscle Strength and Tone: MuscleStrength: Normal and Atrophy  Gait and Station: Not evaluated       LABS   personally reviewed.     No results found for: PHENYTOIN, PHENOBARB, VALPROATE, CBMZ       DIAGNOSIS   Principal Problem:    Major neurocognitive disorder, due to multiple etiologies, with behavioral " disturbance, severe (H)    Active Problem List:  Patient Active Problem List   Diagnosis     TBI with aggressive behavior     HTN (hypertension)     COPD (chronic obstructive pulmonary disease) (H)     Dementia with behavioral disturbance (H)     Chronic schizophrenia (H)     Smoker     Agitation     Behavior disturbance     Psychosis (H)     Major neurocognitive disorder, due to multiple etiologies, with behavioral disturbance, severe (H)     Paranoid schizophrenia, chronic condition with acute exacerbation (H)          PLAN   1. Ongoing education given regarding diagnostic and treatment options with risks, benefits and alternatives and adequate verbalization of understanding.  2.  Medications       BuSpar 30 mg 2 times daily       Clozaril 200 mg at bedtime       Aricept 10 mg at bedtime       Gabapentin 100 mg 3 times a day       Haldol 1 mg at bedtime       Namenda 10 mg 2 times a day       Remeron 15 mg at bedtime       melatonin 5 mg at bedtime  3.  Medical team following the patient   4.   coordinating a safe discharge plan with the patient.    Risk Assessment: [facility] RISK ASSESSMENT: Patient able to contract for safety    Coordination of Care:   Treatment Plan reviewed and physician signed, Care discussed with Care/Treatment Team Members, Chart reviewed and Patient seen      Re-Certification I certify that the inpatient psychiatric facility services furnished since the previous certification were, and continue to be, medically necessary for, either, treatment which could reasonably be expected to improve the patient s condition or diagnostic study and that the hospital records indicate that the services furnished were, either, intensive treatment services, admission and related services necessary for diagnostic study, or equivalent services.     I certify that the patient continues to need, on a daily basis, active treatment furnished directly by or requiring the supervision of inpatient  psychiatric facility personnel.   I estimate 14 days of hospitalization is necessary for proper treatment of the patient. My plans for post-hospital care for this patient are  TBD     Ginger Dubon MD    -     05/06/2022  -     4:03 PM    Total time 15 minutes with > 50%spent on coordination of cares and psycho-education.    This note was created with help of Dragon dictation system. Grammatical / typing errors are not intentional.    Ginger Dubon MD

## 2022-05-06 NOTE — PLAN OF CARE
"  Problem: Behavior Regulation Impairment (Psychotic Signs/Symptoms)  Goal: Improved Behavioral Control (Psychotic Signs/Symptoms)  Outcome: Met   Goal Outcome Evaluation:    Plan of Care Reviewed With: patient               Nursing Assessment    Psychosis (H) [F29]    Admit Date: 1/26/2022    Length of Stay: 100    Patient evaluation continues. Assessed mood,anxiety,thoughts and behavior. Patient is progressing towards goals. Patient is encouraged to participate in groups and assisted to develop healthy coping skills.  Patient admits to auditory or hallucinations. /74 (BP Location: Right arm, Patient Position: Sitting, Cuff Size: Adult Small)   Pulse 108   Temp 98.8  F (37.1  C) (Temporal)   Resp 16   Ht 1.778 m (5' 10\")   Wt 77.7 kg (171 lb 6.4 oz)   SpO2 98%   BMI 24.59 kg/m      Mood: good today    Patient reports depression none and reports anxiety none     Affect: flat blunted    Sleep: good    Appetite: good    SI: denies    HI: denies    SIB: denies      Medication Compliance yes    Group participation:none    ADL's: remind and encourage refuses showers    Fall risk interventions: proper foot wear, orthostatic B/p    Rinku Score Interventions:none    Discharge planning in process    Refer to daily team meeting notes for individualized plan of care. Nursing will continue to assess.    *Scale is 1-10 and 10 is the worst.         "

## 2022-05-06 NOTE — PROGRESS NOTES
"PSYCHIATRY  PROGRESS NOTE     DATE OF SERVICE   05/05/2022  The patient is a 56 year old male who is being evaluated via a video billable telemedicine visit. The patient/guardian has consented to being seen via telemedicine. The provider was in front of a computer in a home office. The patient was on the inpatient unit at [facility].    Start time: 2:21 PM   Stop time: 2:27 PM    The patient/guardian has been notified of the following:     This telemedicine visit is conducted live between you and your clinician. We have found that certain health care needs can be provided without the need for a physical exam. This service lets us provide the care you need with a telemedicine conversation.           CHIEF COMPLAINT   Patient was admitted due to inability to safely care for himself and psychosis.       SUBJECTIVE   Nursing reports:   Patient evaluation continues. Assessed mood,anxiety,thoughts and behavior. Patient is  progressing towards goals. Patient is encouraged to participate in groups and assisted to develop healthy coping skills.  Patient is having auditory hallucinations. /74   Pulse 86   Temp 97.5  F (36.4  C)   Resp 16   Ht 1.778 m (5' 10\")   Wt 77.7 kg (171 lb 6.4 oz)   SpO2 99%   BMI 24.59 kg/m      Patient has been discussed with the  during team meeting.  Patient will discharge to a nursing home once guardianship is approved.     OBJECTIVE   Patient was seen and evaluated at bedside by himself, this was dome with the use of telehealth.  Patient reported that he is doing okay and then demanded to know when he will be leaving the hospital.  I explained to the patient that it we are waiting for him to get a guardian assigned in order to proceed with the discharge.  Patient said that he wanted to leave today.  I reminded the patient that he does not have a home to.  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     MEDICATIONS   Medications:  Scheduled Meds:    aspirin  81 mg Oral Daily     [Held by provider] atorvastatin  80 mg Oral At Bedtime     busPIRone HCl  30 mg Oral BID     cloZAPine  200 mg Oral At Bedtime     donepezil  10 mg Oral At Bedtime     gabapentin  100 mg Oral TID w/meals     haloperidol  1 mg Oral At Bedtime     levothyroxine  88 mcg Oral Daily     megestrol  20 mg Oral Daily     melatonin  5 mg Oral At Bedtime     memantine  10 mg Oral BID     mirtazapine  15 mg Oral At Bedtime     nystatin   Topical BID     salmeterol  1 puff Inhalation BID     Continuous Infusions:  PRN Meds:.[Held by provider] acetaminophen, albuterol, alum & mag hydroxide-simethicone, benzocaine-menthol, hydrOXYzine, nicotine, polyethylene glycol, polyethylene glycol-propylene glycol PF, risperiDONE, senna-docusate, [Held by provider] traZODone    Medication adherence issues: MS Med Adherence Y/N: Yes, Hospitalization  Medication side effects: MEDICATION SIDE EFFECTS: no side effects reported  Benefit: Yes / No: Yes       ROS   A comprehensive review of systems was negative.       MENTAL STATUS EXAM   Vitals: /85   Pulse 104   Temp 97.9  F (36.6  C) (Oral)   Resp 16   Ht 1.778 m (5' 10\")   Wt 77.7 kg (171 lb 6.4 oz)   SpO2 98%   BMI 24.59 kg/m      Appearance:  No apparent distress  Mood: \"I am leaving today.\"  Affect: Confused  Suicidal Ideation: denies   Homicidal Ideation: denies   Thought process: concrete  Thought content: Remains confused and delusional.  He is responding to internal stimuli.  Fund of Knowledge: Below average  Attention/Concentration: Easily distracted  Language ability: Significantly impaired  Memory: Global memory impairment  Insight:  Poor.  Judgement: Poor  Orientation: Only to person  Psychomotor Behavior: slowed    Muscle Strength and Tone: MuscleStrength: Normal and Atrophy  Gait and Station: " Not evaluated       LABS   personally reviewed.     No results found for: PHENYTOIN, PHENOBARB, VALPROATE, CBMZ       DIAGNOSIS   Principal Problem:    Major neurocognitive disorder, due to multiple etiologies, with behavioral disturbance, severe (H)    Active Problem List:  Patient Active Problem List   Diagnosis     TBI with aggressive behavior     HTN (hypertension)     COPD (chronic obstructive pulmonary disease) (H)     Dementia with behavioral disturbance (H)     Chronic schizophrenia (H)     Smoker     Agitation     Behavior disturbance     Psychosis (H)     Major neurocognitive disorder, due to multiple etiologies, with behavioral disturbance, severe (H)     Paranoid schizophrenia, chronic condition with acute exacerbation (H)          PLAN   1. Ongoing education given regarding diagnostic and treatment options with risks, benefits and alternatives and adequate verbalization of understanding.  2.  Medications       BuSpar 30 mg 2 times daily       Clozaril 200 mg at bedtime       Aricept 10 mg at bedtime       Gabapentin 100 mg 3 times a day       Haldol 1 mg at bedtime       Namenda 10 mg 2 times a day       Remeron 15 mg at bedtime       melatonin 5 mg at bedtime  3.  Medical team following the patient   4.   coordinating a safe discharge plan with the patient.    Risk Assessment: [facility] RISK ASSESSMENT: Patient able to contract for safety    Coordination of Care:   Treatment Plan reviewed and physician signed, Care discussed with Care/Treatment Team Members, Chart reviewed and Patient seen      Re-Certification I certify that the inpatient psychiatric facility services furnished since the previous certification were, and continue to be, medically necessary for, either, treatment which could reasonably be expected to improve the patient s condition or diagnostic study and that the hospital records indicate that the services furnished were, either, intensive treatment services, admission and  related services necessary for diagnostic study, or equivalent services.     I certify that the patient continues to need, on a daily basis, active treatment furnished directly by or requiring the supervision of inpatient psychiatric facility personnel.   I estimate 14 days of hospitalization is necessary for proper treatment of the patient. My plans for post-hospital care for this patient are  TBD     Ginger Dubon MD    -     05/05/2022  -     7:33 PM    Total time 15 minutes with > 50%spent on coordination of cares and psycho-education.    This note was created with help of Dragon dictation system. Grammatical / typing errors are not intentional.    Ginger Dubon MD

## 2022-05-07 LAB
CLOZAPINE SERPL-MCNC: 390 NG/ML
CLOZAPINE+NOR SERPL-MCNC: 582 NG/ML
CNO SERPL-MCNC: <100 NG/ML
NORCLOZAPINE SERPL-MCNC: 192 NG/ML

## 2022-05-07 PROCEDURE — 124N000003 HC R&B MH SENIOR/ADOLESCENT

## 2022-05-07 PROCEDURE — 250N000013 HC RX MED GY IP 250 OP 250 PS 637: Performed by: PSYCHIATRY & NEUROLOGY

## 2022-05-07 RX ADMIN — SALMETEROL XINAFOATE 1 PUFF: 50 POWDER, METERED ORAL; RESPIRATORY (INHALATION) at 19:55

## 2022-05-07 RX ADMIN — MEGESTROL ACETATE 20 MG: 20 TABLET ORAL at 08:53

## 2022-05-07 RX ADMIN — DONEPEZIL HYDROCHLORIDE 10 MG: 10 TABLET ORAL at 19:56

## 2022-05-07 RX ADMIN — MEMANTINE 10 MG: 10 TABLET ORAL at 19:56

## 2022-05-07 RX ADMIN — HALOPERIDOL 1 MG: 1 TABLET ORAL at 19:56

## 2022-05-07 RX ADMIN — Medication 5 MG: at 19:56

## 2022-05-07 RX ADMIN — CLOZAPINE 200 MG: 100 TABLET ORAL at 19:56

## 2022-05-07 RX ADMIN — GABAPENTIN 100 MG: 100 CAPSULE ORAL at 17:35

## 2022-05-07 RX ADMIN — BUSPIRONE HYDROCHLORIDE 30 MG: 15 TABLET ORAL at 19:56

## 2022-05-07 RX ADMIN — MEMANTINE 10 MG: 10 TABLET ORAL at 08:53

## 2022-05-07 RX ADMIN — SALMETEROL XINAFOATE 1 PUFF: 50 POWDER, METERED ORAL; RESPIRATORY (INHALATION) at 08:51

## 2022-05-07 RX ADMIN — LEVOTHYROXINE SODIUM 88 MCG: 0.09 TABLET ORAL at 08:53

## 2022-05-07 RX ADMIN — GABAPENTIN 100 MG: 100 CAPSULE ORAL at 11:59

## 2022-05-07 RX ADMIN — GABAPENTIN 100 MG: 100 CAPSULE ORAL at 08:53

## 2022-05-07 RX ADMIN — BUSPIRONE HYDROCHLORIDE 30 MG: 15 TABLET ORAL at 08:53

## 2022-05-07 RX ADMIN — MIRTAZAPINE 15 MG: 15 TABLET, FILM COATED ORAL at 19:56

## 2022-05-07 RX ADMIN — ASPIRIN 81 MG: 81 TABLET, COATED ORAL at 08:53

## 2022-05-07 ASSESSMENT — ACTIVITIES OF DAILY LIVING (ADL)
DRESS: SCRUBS (BEHAVIORAL HEALTH)
ORAL_HYGIENE: INDEPENDENT;PROMPTS
LAUNDRY: UNABLE TO COMPLETE
LAUNDRY: UNABLE TO COMPLETE
ORAL_HYGIENE: PROMPTS;INDEPENDENT
DRESS: PROMPTS;INDEPENDENT
HYGIENE/GROOMING: PROMPTS;INDEPENDENT
HYGIENE/GROOMING: PROMPTS;WITH ASSISTANCE

## 2022-05-07 NOTE — PLAN OF CARE
Problem: Behavioral Health Plan of Care  Goal: Plan of Care Review  Outcome: Ongoing, Progressing  Flowsheets (Taken 5/7/2022 6610)  Plan of Care Reviewed With: patient  Patient Agreement with Plan of Care: agrees    Pt presents with blunted, flat affect and calm mood. Denies SI/SIB. Denies depression and anxiety. Pt is oriented to self only. States that he is at [facility] and that the date is June 15th 2016. Pt was reoriented, but he is unable to retain this information. Initially stated in the morning he was not having hallucinations, but when writer went to his room to advise him about lunch, he was noted to be saying something. When asked what he said, pt stated that he was just talking to his mother and step father. Pt is isolative and withdrawn, not social with peers or staff. Did come out to the lounge to eat breakfast, but refused to have lunch. Ate 100% of his breakfast tray. Offered to assist pt with shower but he refused. Pt is voiding, as it was noted while writer was in the room, pt got up to use the bathroom. Denies pain. VSS. Medication compliant. No other concerns or complaints noted.

## 2022-05-07 NOTE — PLAN OF CARE
"  Problem: Behavioral Health Plan of Care  Goal: Plan of Care Review  Outcome: Ongoing, Not Progressing   Goal Outcome Evaluation:          Pt presents with a flat/blunted affect, calm mood alert and oriented to self only. He states date is \" Tuesday, June 20th 2099, the president of [place] is Rodolfo and he is currently at [facility]. Pt oriented to date and place and he stated \"So it has been 2 months and they are done with my commitment pares yet\". . Remained withdrawn and isolative to room all shift. Pt ate diner in his room and only ate about 25% of his dinner, stating \"I am not hungry\".   Pt was medication compliant and VSS.            "

## 2022-05-07 NOTE — PLAN OF CARE
Problem: Sleep Disturbance (Psychotic Signs/Symptoms)  Goal: Improved Sleep (Psychotic Signs/Symptoms)  Outcome: Ongoing, Progressing   Goal Outcome Evaluation:    Patient slept a total of 10. 75 hours without any interruptions. No behavioral issues noted the whole shift.

## 2022-05-08 PROCEDURE — 250N000013 HC RX MED GY IP 250 OP 250 PS 637: Performed by: PSYCHIATRY & NEUROLOGY

## 2022-05-08 PROCEDURE — 124N000003 HC R&B MH SENIOR/ADOLESCENT

## 2022-05-08 RX ADMIN — BUSPIRONE HYDROCHLORIDE 30 MG: 15 TABLET ORAL at 19:44

## 2022-05-08 RX ADMIN — MEGESTROL ACETATE 20 MG: 20 TABLET ORAL at 08:22

## 2022-05-08 RX ADMIN — BUSPIRONE HYDROCHLORIDE 30 MG: 15 TABLET ORAL at 08:21

## 2022-05-08 RX ADMIN — NYSTATIN: 100000 CREAM TOPICAL at 08:22

## 2022-05-08 RX ADMIN — GABAPENTIN 100 MG: 100 CAPSULE ORAL at 12:08

## 2022-05-08 RX ADMIN — MEMANTINE 10 MG: 10 TABLET ORAL at 19:44

## 2022-05-08 RX ADMIN — GABAPENTIN 100 MG: 100 CAPSULE ORAL at 08:21

## 2022-05-08 RX ADMIN — MIRTAZAPINE 15 MG: 15 TABLET, FILM COATED ORAL at 19:44

## 2022-05-08 RX ADMIN — CLOZAPINE 200 MG: 100 TABLET ORAL at 19:45

## 2022-05-08 RX ADMIN — SALMETEROL XINAFOATE 1 PUFF: 50 POWDER, METERED ORAL; RESPIRATORY (INHALATION) at 19:44

## 2022-05-08 RX ADMIN — LEVOTHYROXINE SODIUM 88 MCG: 0.09 TABLET ORAL at 08:21

## 2022-05-08 RX ADMIN — SALMETEROL XINAFOATE 1 PUFF: 50 POWDER, METERED ORAL; RESPIRATORY (INHALATION) at 08:22

## 2022-05-08 RX ADMIN — DONEPEZIL HYDROCHLORIDE 10 MG: 10 TABLET ORAL at 19:45

## 2022-05-08 RX ADMIN — Medication 5 MG: at 19:45

## 2022-05-08 RX ADMIN — MEMANTINE 10 MG: 10 TABLET ORAL at 08:22

## 2022-05-08 RX ADMIN — GABAPENTIN 100 MG: 100 CAPSULE ORAL at 17:29

## 2022-05-08 RX ADMIN — ASPIRIN 81 MG: 81 TABLET, COATED ORAL at 08:21

## 2022-05-08 RX ADMIN — HALOPERIDOL 1 MG: 1 TABLET ORAL at 19:45

## 2022-05-08 NOTE — PLAN OF CARE
"Goal Outcome Evaluation:    Plan of Care Reviewed With: patient     Patient continues to be isolative and withdrawn, refused to eat breakfast, came out for lunch, ate 50% of his meal. Denies SI, SIB and hallucination. Denies anxiety, he said \"Yes\" when asked about depression. Denies pain. VSS. He is medication complaint. Not attending group. Not social with peers and staff. Continue to monitor pt. as plan of care         "

## 2022-05-08 NOTE — PLAN OF CARE
Goal Outcome Evaluation:    Plan of Care Reviewed With: patient     Patient is isolative and withdrawn. He only came out of his room for dinner. He ate 75%. His affect is flat. His mood is calm. He denies SI and SIB. He denies depression and anxiety. He denies AH. He does not appear to be responding to internal stimuli. He denies pain. He refused to attend groups. He is disheveled and unkept. He refused to take a shower. He is not social with peers and staff. He is medication compliant.

## 2022-05-08 NOTE — PLAN OF CARE
"  Problem: Sleep Disturbance (Psychotic Signs/Symptoms)  Goal: Improved Sleep (Psychotic Signs/Symptoms)  Outcome: Met   Goal Outcome Evaluation:     Patient slept a total of 10 hours. Woke up at one time, came to the lounge and seemed to be confused. He asked, \" Where's the restroom?\" He was redirected back to his room and was oriented to his restroom. Went back to sleep after voiding in the restroom. No other behavioral issues noted.                  "

## 2022-05-09 ENCOUNTER — APPOINTMENT (OUTPATIENT)
Dept: GENERAL RADIOLOGY | Facility: CLINIC | Age: 57
DRG: 885 | End: 2022-05-09
Attending: PSYCHIATRY & NEUROLOGY
Payer: COMMERCIAL

## 2022-05-09 LAB
ALBUMIN SERPL-MCNC: 3.2 G/DL (ref 3.4–5)
ALP SERPL-CCNC: 72 U/L (ref 40–150)
ALT SERPL W P-5'-P-CCNC: 104 U/L (ref 0–70)
ANION GAP SERPL CALCULATED.3IONS-SCNC: 7 MMOL/L (ref 3–14)
AST SERPL W P-5'-P-CCNC: 148 U/L (ref 0–45)
BILIRUB SERPL-MCNC: 0.3 MG/DL (ref 0.2–1.3)
BUN SERPL-MCNC: 19 MG/DL (ref 7–30)
CALCIUM SERPL-MCNC: 8.5 MG/DL (ref 8.5–10.1)
CHLORIDE BLD-SCNC: 118 MMOL/L (ref 94–109)
CO2 SERPL-SCNC: 17 MMOL/L (ref 20–32)
CREAT SERPL-MCNC: 0.66 MG/DL (ref 0.66–1.25)
ERYTHROCYTE [DISTWIDTH] IN BLOOD BY AUTOMATED COUNT: 14.1 % (ref 10–15)
GFR SERPL CREATININE-BSD FRML MDRD: >90 ML/MIN/1.73M2
GLUCOSE BLD-MCNC: 97 MG/DL (ref 70–99)
HCT VFR BLD AUTO: 43.1 % (ref 40–53)
HGB BLD-MCNC: 15 G/DL (ref 13.3–17.7)
MAGNESIUM SERPL-MCNC: 2.2 MG/DL (ref 1.6–2.3)
MCH RBC QN AUTO: 32.1 PG (ref 26.5–33)
MCHC RBC AUTO-ENTMCNC: 34.8 G/DL (ref 31.5–36.5)
MCV RBC AUTO: 92 FL (ref 78–100)
PLATELET # BLD AUTO: 163 10E3/UL (ref 150–450)
POTASSIUM BLD-SCNC: 4.1 MMOL/L (ref 3.4–5.3)
PROT SERPL-MCNC: 7.2 G/DL (ref 6.8–8.8)
RBC # BLD AUTO: 4.68 10E6/UL (ref 4.4–5.9)
SODIUM SERPL-SCNC: 142 MMOL/L (ref 133–144)
TROPONIN I SERPL HS-MCNC: 4 NG/L
WBC # BLD AUTO: 5.4 10E3/UL (ref 4–11)

## 2022-05-09 PROCEDURE — 82040 ASSAY OF SERUM ALBUMIN: CPT | Performed by: PHYSICIAN ASSISTANT

## 2022-05-09 PROCEDURE — 124N000003 HC R&B MH SENIOR/ADOLESCENT

## 2022-05-09 PROCEDURE — 93010 ELECTROCARDIOGRAM REPORT: CPT | Performed by: INTERNAL MEDICINE

## 2022-05-09 PROCEDURE — 99231 SBSQ HOSP IP/OBS SF/LOW 25: CPT | Performed by: PHYSICIAN ASSISTANT

## 2022-05-09 PROCEDURE — 85027 COMPLETE CBC AUTOMATED: CPT | Performed by: PHYSICIAN ASSISTANT

## 2022-05-09 PROCEDURE — 93005 ELECTROCARDIOGRAM TRACING: CPT

## 2022-05-09 PROCEDURE — 36415 COLL VENOUS BLD VENIPUNCTURE: CPT | Performed by: PHYSICIAN ASSISTANT

## 2022-05-09 PROCEDURE — 71045 X-RAY EXAM CHEST 1 VIEW: CPT | Mod: 26 | Performed by: RADIOLOGY

## 2022-05-09 PROCEDURE — 71045 X-RAY EXAM CHEST 1 VIEW: CPT

## 2022-05-09 PROCEDURE — 99232 SBSQ HOSP IP/OBS MODERATE 35: CPT | Performed by: PSYCHIATRY & NEUROLOGY

## 2022-05-09 PROCEDURE — 250N000013 HC RX MED GY IP 250 OP 250 PS 637: Performed by: PSYCHIATRY & NEUROLOGY

## 2022-05-09 PROCEDURE — 83735 ASSAY OF MAGNESIUM: CPT | Performed by: PHYSICIAN ASSISTANT

## 2022-05-09 PROCEDURE — 80053 COMPREHEN METABOLIC PANEL: CPT | Performed by: PHYSICIAN ASSISTANT

## 2022-05-09 PROCEDURE — 84484 ASSAY OF TROPONIN QUANT: CPT | Performed by: PHYSICIAN ASSISTANT

## 2022-05-09 RX ADMIN — HALOPERIDOL 1 MG: 1 TABLET ORAL at 19:49

## 2022-05-09 RX ADMIN — BUSPIRONE HYDROCHLORIDE 30 MG: 15 TABLET ORAL at 19:49

## 2022-05-09 RX ADMIN — MEGESTROL ACETATE 20 MG: 20 TABLET ORAL at 07:55

## 2022-05-09 RX ADMIN — CLOZAPINE 200 MG: 100 TABLET ORAL at 19:49

## 2022-05-09 RX ADMIN — BUSPIRONE HYDROCHLORIDE 30 MG: 15 TABLET ORAL at 07:55

## 2022-05-09 RX ADMIN — GABAPENTIN 100 MG: 100 CAPSULE ORAL at 12:05

## 2022-05-09 RX ADMIN — LEVOTHYROXINE SODIUM 88 MCG: 0.09 TABLET ORAL at 07:55

## 2022-05-09 RX ADMIN — ASPIRIN 81 MG: 81 TABLET, COATED ORAL at 07:55

## 2022-05-09 RX ADMIN — GABAPENTIN 100 MG: 100 CAPSULE ORAL at 07:55

## 2022-05-09 RX ADMIN — SALMETEROL XINAFOATE 1 PUFF: 50 POWDER, METERED ORAL; RESPIRATORY (INHALATION) at 19:48

## 2022-05-09 RX ADMIN — GABAPENTIN 100 MG: 100 CAPSULE ORAL at 17:16

## 2022-05-09 RX ADMIN — MEMANTINE 10 MG: 10 TABLET ORAL at 19:49

## 2022-05-09 RX ADMIN — NYSTATIN: 100000 CREAM TOPICAL at 07:56

## 2022-05-09 RX ADMIN — Medication 5 MG: at 19:49

## 2022-05-09 RX ADMIN — MEMANTINE 10 MG: 10 TABLET ORAL at 07:55

## 2022-05-09 RX ADMIN — DONEPEZIL HYDROCHLORIDE 10 MG: 10 TABLET ORAL at 19:49

## 2022-05-09 RX ADMIN — MIRTAZAPINE 15 MG: 15 TABLET, FILM COATED ORAL at 19:49

## 2022-05-09 ASSESSMENT — ACTIVITIES OF DAILY LIVING (ADL)
ORAL_HYGIENE: PROMPTS
HYGIENE/GROOMING: PROMPTS
DRESS: SCRUBS (BEHAVIORAL HEALTH)
DRESS: SCRUBS (BEHAVIORAL HEALTH)
ORAL_HYGIENE: PROMPTS
LAUNDRY: UNABLE TO COMPLETE
HYGIENE/GROOMING: PROMPTS
LAUNDRY: UNABLE TO COMPLETE

## 2022-05-09 NOTE — PLAN OF CARE
Goal Outcome Evaluation:    Plan of Care Reviewed With: patient     Patient is isolative and withdrawn in his room, came to the lounge for meal only. Denies SI, SIB and hallucination. Denies anxiety and depression. Denies pain. He is medication complaint. Not attending group. Not social with peers and staff. EKG completed this morning, per IM note portable chest X-ray ordered radiology called. Patient allowed lab to draw CBC with platelet, and CMP. Continue to monitor pt. as plan of care.

## 2022-05-09 NOTE — PROVIDER NOTIFICATION
This writer walked in to patient room to administer his ordered inhaler, found patient in labored breathing, tried to wake him up but wasn't responding. Vitals taken, BP 93/63, HR 92 Resp. 16. Provider and Internal medicine notified. Lab, EKG and chest x-ray ordered per IM.

## 2022-05-09 NOTE — PLAN OF CARE
Problem: Behavioral Health Plan of Care  Goal: Adheres to Safety Considerations for Self and Others  Outcome: Ongoing, Progressing  Intervention: Develop and Maintain Individualized Safety Plan  Recent Flowsheet Documentation  Taken 5/8/2022 2000 by Dann Maldonado RN  Safety Measures:    environmental rounds completed    safety plan reviewed  Plan of Care Reviewed With: patient     Pt calm, cooperative when approached, denies SI/SIB, no depression/anxiety, denies hallucination. Flat affect, intermittently making eye contact when talked to, voice soft. Pt continue to isolate self, withdrawn in his room.     Pt denies any pain, came out during dinner and again to have some snacks. Pt ate 75% of his dinner, did not join activity. Disheveled and unkept,  refused to take a shower. Pt is medication compliant.

## 2022-05-09 NOTE — PROGRESS NOTES
"PSYCHIATRY  PROGRESS NOTE     DATE OF SERVICE   05/09/2022       CHIEF COMPLAINT   Patient was admitted due to inability to safely care for himself and psychosis.       SUBJECTIVE   Nursing reports: Patient had an uneventful weekend.  This morning nurse noticed that the patient seemed to have problems breathing and he was not responding.  Medical team has been paged and they have ordered multiple test.    Patient has been discussed with the  during team meeting.  Patient will discharge to a nursing home once guardianship is approved.     OBJECTIVE   Patient was seen and evaluated at bedside with staff present during the assessment, this was a face-to-face evaluation.  When I evaluated the patient he said that he is doing \"fine\".  Patient denies having any chest pain and he denies any other issues.  He asks why we are ordering about work and I discussed with the patient that this morning it seemed that he was not feeling good.  Patient said \"thank you\" and he agreed to have the blood work done.  I also informed the patient that we have ordered a chest x-ray and the patient said that he will agree with this.    He continues to be internally preoccupied and responding to internal stimuli.  Patient is oriented only to person and he does not seem to fully process all the information provided to him.     MEDICATIONS   Medications:  Scheduled Meds:    aspirin  81 mg Oral Daily     [Held by provider] atorvastatin  80 mg Oral At Bedtime     busPIRone HCl  30 mg Oral BID     cloZAPine  200 mg Oral At Bedtime     donepezil  10 mg Oral At Bedtime     gabapentin  100 mg Oral TID w/meals     haloperidol  1 mg Oral At Bedtime     levothyroxine  88 mcg Oral Daily     megestrol  20 mg Oral Daily     melatonin  5 mg Oral At Bedtime     memantine  10 mg Oral BID     mirtazapine  15 mg Oral At Bedtime     nystatin   Topical BID     salmeterol  1 puff Inhalation BID     Continuous Infusions:  PRN Meds:.[Held by provider] " "acetaminophen, albuterol, alum & mag hydroxide-simethicone, benzocaine-menthol, hydrOXYzine, nicotine, polyethylene glycol, polyethylene glycol-propylene glycol PF, risperiDONE, senna-docusate, [Held by provider] traZODone    Medication adherence issues: MS Med Adherence Y/N: Yes, Hospitalization  Medication side effects: MEDICATION SIDE EFFECTS: no side effects reported  Benefit: Yes / No: Yes       ROS   A comprehensive review of systems was negative.       MENTAL STATUS EXAM   Vitals: BP 93/63 (BP Location: Left arm, Patient Position: Right side, Cuff Size: Adult Regular)   Pulse 92   Temp 98  F (36.7  C) (Temporal)   Resp 16   Ht 1.778 m (5' 10\")   Wt 77.7 kg (171 lb 6.4 oz)   SpO2 98%   BMI 24.59 kg/m      Appearance:  No apparent distress  Mood: \"I am fine.\"  Affect: Calm   Suicidal Ideation: denies   Homicidal Ideation: denies   Thought process: concrete  Thought content: Remains confused and delusional.  He is responding to internal stimuli.  Fund of Knowledge: Below average  Attention/Concentration: Easily distracted  Language ability: Significantly impaired  Memory: Global memory impairment  Insight:  Poor.  Judgement: Poor  Orientation: Only to person  Psychomotor Behavior: slowed    Muscle Strength and Tone: MuscleStrength: Normal and Atrophy  Gait and Station: Not evaluated       LABS   personally reviewed.     No results found for: PHENYTOIN, PHENOBARB, VALPROATE, CBMZ       DIAGNOSIS   Principal Problem:    Major neurocognitive disorder, due to multiple etiologies, with behavioral disturbance, severe (H)    Active Problem List:  Patient Active Problem List   Diagnosis     TBI with aggressive behavior     HTN (hypertension)     COPD (chronic obstructive pulmonary disease) (H)     Dementia with behavioral disturbance (H)     Chronic schizophrenia (H)     Smoker     Agitation     Behavior disturbance     Psychosis (H)     Major neurocognitive disorder, due to multiple etiologies, with behavioral " disturbance, severe (H)     Paranoid schizophrenia, chronic condition with acute exacerbation (H)          PLAN   1. Ongoing education given regarding diagnostic and treatment options with risks, benefits and alternatives and adequate verbalization of understanding.  2.  Medications       BuSpar 30 mg 2 times daily       Clozaril 200 mg at bedtime       Aricept 10 mg at bedtime       Gabapentin 100 mg 3 times a day       Haldol 1 mg at bedtime       Namenda 10 mg 2 times a day       Remeron 15 mg at bedtime       melatonin 5 mg at bedtime  3.  Medical team following the patient   4.   coordinating a safe discharge plan with the patient.    Risk Assessment: [facility] RISK ASSESSMENT: Patient able to contract for safety    Coordination of Care:   Treatment Plan reviewed and physician signed, Care discussed with Care/Treatment Team Members, Chart reviewed and Patient seen      Re-Certification I certify that the inpatient psychiatric facility services furnished since the previous certification were, and continue to be, medically necessary for, either, treatment which could reasonably be expected to improve the patient s condition or diagnostic study and that the hospital records indicate that the services furnished were, either, intensive treatment services, admission and related services necessary for diagnostic study, or equivalent services.     I certify that the patient continues to need, on a daily basis, active treatment furnished directly by or requiring the supervision of inpatient psychiatric facility personnel.   I estimate 14 days of hospitalization is necessary for proper treatment of the patient. My plans for post-hospital care for this patient are  TBD     Ginger Dubon MD    -     05/09/2022  -     1:09 PM    Total time 25 minutes with > 50%spent on coordination of cares and psycho-education.    This note was created with help of Dragon dictation system. Grammatical / typing errors  are not intentional.    Ginger Dubon MD

## 2022-05-09 NOTE — PROGRESS NOTES
"Brief Medicine Note    Contacted by nursing regarding labored breathing this morning. RN is seeing him for the first time but sign out per colleagues indicates this has occurred previously.    Patient is found laying in bed and allows for EKG.  He says \"I've been here too long\" and \"I'm going to die\".  He denies changes or complaints. He is declining any further workup    Denies N/V, F/C, chest pain, sob, palpitations, dizziness, numbess or tingling, cahnges in sensation, headaches, vision changes, abdominal pain, rashes, lumps, lesions, urinary complaints, changes to bowels,, bleeding or other complaints.     Today's vital signs, medications, and nursing notes were reviewed. Labs reviewed.     BP 93/63 (BP Location: Left arm, Patient Position: Right side, Cuff Size: Adult Regular)   Pulse 92   Temp 98  F (36.7  C) (Temporal)   Resp 16   Ht 1.778 m (5' 10\")   Wt 77.7 kg (171 lb 6.4 oz)   SpO2 98%   BMI 24.59 kg/m    General: A&O. NAD. Laying supine in bed and able to roll to side independently  HEENT: NC, AT, pupils equal and round  CV: RRR, no m/r/g  PULM: right upper lobe rales, no wheezing, rhonchi, nonlabored  GI: NABS, soft, NT, ND, old appearing midline incisional scar, no other lesions  Extremities: no edema + DP and radial pulses, moves all exremities and CSMs intact  Neuro: no gross focal deficits    A/P:  Question of labored breathing  RUL rales (improved with cough)  No labored breathing on exam.    - CXR if patient allows  - EKG sinus and stable RBBB, no ST segment changes vs prior  - if patient allows labs obtain: trop, CBC, CMP, Mg      Medicine will follow peripherally     Milagros Ray PA-C  [facility]  Contact information available via [facility] Paging/Directory      "

## 2022-05-09 NOTE — PLAN OF CARE
Problem: Sleep Disturbance (Psychotic Signs/Symptoms)  Goal: Improved Sleep (Psychotic Signs/Symptoms)  Outcome: Ongoing, Progressing   Goal Outcome Evaluation:    Patient slept a total of 10.5 hours.  No behavioral concerns raised the whole shift.

## 2022-05-10 LAB
ATRIAL RATE - MUSE: 92 BPM
DIASTOLIC BLOOD PRESSURE - MUSE: NORMAL MMHG
INTERPRETATION ECG - MUSE: NORMAL
P AXIS - MUSE: 48 DEGREES
PR INTERVAL - MUSE: 164 MS
QRS DURATION - MUSE: 116 MS
QT - MUSE: 380 MS
QTC - MUSE: 469 MS
R AXIS - MUSE: 48 DEGREES
SYSTOLIC BLOOD PRESSURE - MUSE: NORMAL MMHG
T AXIS - MUSE: 48 DEGREES
VENTRICULAR RATE- MUSE: 92 BPM

## 2022-05-10 PROCEDURE — 250N000013 HC RX MED GY IP 250 OP 250 PS 637: Performed by: PSYCHIATRY & NEUROLOGY

## 2022-05-10 PROCEDURE — 124N000003 HC R&B MH SENIOR/ADOLESCENT

## 2022-05-10 RX ADMIN — Medication 5 MG: at 19:36

## 2022-05-10 RX ADMIN — MIRTAZAPINE 15 MG: 15 TABLET, FILM COATED ORAL at 19:36

## 2022-05-10 RX ADMIN — SALMETEROL XINAFOATE 1 PUFF: 50 POWDER, METERED ORAL; RESPIRATORY (INHALATION) at 19:37

## 2022-05-10 RX ADMIN — BUSPIRONE HYDROCHLORIDE 30 MG: 15 TABLET ORAL at 09:00

## 2022-05-10 RX ADMIN — BUSPIRONE HYDROCHLORIDE 30 MG: 15 TABLET ORAL at 19:36

## 2022-05-10 RX ADMIN — LEVOTHYROXINE SODIUM 88 MCG: 0.09 TABLET ORAL at 09:00

## 2022-05-10 RX ADMIN — HALOPERIDOL 1 MG: 1 TABLET ORAL at 19:37

## 2022-05-10 RX ADMIN — MEMANTINE 10 MG: 10 TABLET ORAL at 19:36

## 2022-05-10 RX ADMIN — MEMANTINE 10 MG: 10 TABLET ORAL at 09:00

## 2022-05-10 RX ADMIN — DONEPEZIL HYDROCHLORIDE 10 MG: 10 TABLET ORAL at 19:37

## 2022-05-10 RX ADMIN — GABAPENTIN 100 MG: 100 CAPSULE ORAL at 17:48

## 2022-05-10 RX ADMIN — GABAPENTIN 100 MG: 100 CAPSULE ORAL at 12:13

## 2022-05-10 RX ADMIN — SALMETEROL XINAFOATE 1 PUFF: 50 POWDER, METERED ORAL; RESPIRATORY (INHALATION) at 09:00

## 2022-05-10 RX ADMIN — GABAPENTIN 100 MG: 100 CAPSULE ORAL at 08:59

## 2022-05-10 RX ADMIN — MEGESTROL ACETATE 20 MG: 20 TABLET ORAL at 08:59

## 2022-05-10 RX ADMIN — ASPIRIN 81 MG: 81 TABLET, COATED ORAL at 08:59

## 2022-05-10 RX ADMIN — CLOZAPINE 200 MG: 100 TABLET ORAL at 19:36

## 2022-05-10 ASSESSMENT — ACTIVITIES OF DAILY LIVING (ADL)
HYGIENE/GROOMING: PROMPTS
DRESS: SCRUBS (BEHAVIORAL HEALTH)
ORAL_HYGIENE: PROMPTS
LAUNDRY: UNABLE TO COMPLETE
DRESS: SCRUBS (BEHAVIORAL HEALTH)
ORAL_HYGIENE: PROMPTS
LAUNDRY: UNABLE TO COMPLETE
HYGIENE/GROOMING: PROMPTS

## 2022-05-10 NOTE — PLAN OF CARE
Goal Outcome Evaluation:    Plan of Care Reviewed With: patient     Patient continues to be withdrawn and isolative in his room. Denies SI, SIB and hallucination. Denies anxiety and depression. Denies pain. VSS. He is medication complaint except Nystatin cream. Appetite and sleep is good. Not attending group. He is not social with peers and staff. Refused shower. Continue to monitor pt. as plan of care.

## 2022-05-10 NOTE — PLAN OF CARE
Problem: Behavioral Health Plan of Care  Goal: Adheres to Safety Considerations for Self and Others  Outcome: Ongoing, Progressing  Intervention: Develop and Maintain Individualized Safety Plan  Recent Flowsheet Documentation  Taken 5/9/2022 2135 by Dann Maldonado RN  Safety Measures:    environmental rounds completed    safety rounds completed     Problem: Mood Impairment (Psychotic Signs/Symptoms)  Goal: Improved Mood Symptoms (Psychotic Signs/Symptoms)  Outcome: Ongoing, Progressing       Plan of Care Reviewed With: patient     Pt calm, cooperative when approached, denies SI/SIB, no depression/anxiety, no hallucination, flat affect. Pt continue to isolate self, withdrawn in his room. No recurrence of labored breathing, showed no signs of tachypnea/respiratory distress. Labs and xray results came back.    Pt denies any pain, denies shortness of breath, came to the dining room for dinner, ate 75% of his food, did not join activity. Disheveled and unkept, refused showed. Pt medication compliant.

## 2022-05-10 NOTE — PLAN OF CARE
Problem: Behavioral Health Plan of Care  Goal: Plan of Care Review  Outcome: Ongoing, Progressing  Flowsheets (Taken 5/10/2022 1752)  Plan of Care Reviewed With: patient  Patient Agreement with Plan of Care: agrees    Pt presents with blunted, flat affect and calm mood. Denies SI/SIB. Pt would not respond when asked about hallucinations, but pt does appear as if he is responding to internal stimuli. He denies depression and anxiety. Reports that he is at [facility] and that today's date is August 23rd 2099 or 3000. Pt does not know why he is here. Attempted to reorient pt, but he just stared at writer. He spent most of the evening isolated to his room, only coming out for dinner which he ate 75% of. Pt refused shower when offered. VSS. Medication compliant, refused cream for foot. Denies pain. No PRNs given. Gait steady and balanced, but pt was noted to be unsteady when standing up from table at dinner time. No other concerns or complaints noted.

## 2022-05-10 NOTE — PLAN OF CARE
Problem: Sleep Disturbance (Psychotic Signs/Symptoms)  Goal: Improved Sleep (Psychotic Signs/Symptoms)  Outcome: Ongoing, Progressing   Goal Outcome Evaluation: ongoing    Pt appears to have slept 9 hours throughout the night.  No concerns noted or reported.  Pt continues on fall precautions.  15 minute checks remain ongoing.  Will continue to monitor and support plan of care.

## 2022-05-11 PROCEDURE — 250N000013 HC RX MED GY IP 250 OP 250 PS 637: Performed by: PSYCHIATRY & NEUROLOGY

## 2022-05-11 PROCEDURE — 99231 SBSQ HOSP IP/OBS SF/LOW 25: CPT | Performed by: PSYCHIATRY & NEUROLOGY

## 2022-05-11 PROCEDURE — 124N000003 HC R&B MH SENIOR/ADOLESCENT

## 2022-05-11 RX ADMIN — MEMANTINE 10 MG: 10 TABLET ORAL at 08:17

## 2022-05-11 RX ADMIN — SALMETEROL XINAFOATE 1 PUFF: 50 POWDER, METERED ORAL; RESPIRATORY (INHALATION) at 08:17

## 2022-05-11 RX ADMIN — LEVOTHYROXINE SODIUM 88 MCG: 0.09 TABLET ORAL at 08:17

## 2022-05-11 RX ADMIN — GABAPENTIN 100 MG: 100 CAPSULE ORAL at 17:23

## 2022-05-11 RX ADMIN — Medication 5 MG: at 19:54

## 2022-05-11 RX ADMIN — MEGESTROL ACETATE 20 MG: 20 TABLET ORAL at 08:17

## 2022-05-11 RX ADMIN — MIRTAZAPINE 15 MG: 15 TABLET, FILM COATED ORAL at 19:54

## 2022-05-11 RX ADMIN — MEMANTINE 10 MG: 10 TABLET ORAL at 19:55

## 2022-05-11 RX ADMIN — HALOPERIDOL 1 MG: 1 TABLET ORAL at 19:54

## 2022-05-11 RX ADMIN — BUSPIRONE HYDROCHLORIDE 30 MG: 15 TABLET ORAL at 19:54

## 2022-05-11 RX ADMIN — DONEPEZIL HYDROCHLORIDE 10 MG: 10 TABLET ORAL at 19:54

## 2022-05-11 RX ADMIN — CLOZAPINE 200 MG: 100 TABLET ORAL at 19:54

## 2022-05-11 RX ADMIN — SALMETEROL XINAFOATE 1 PUFF: 50 POWDER, METERED ORAL; RESPIRATORY (INHALATION) at 19:55

## 2022-05-11 RX ADMIN — ASPIRIN 81 MG: 81 TABLET, COATED ORAL at 08:18

## 2022-05-11 RX ADMIN — GABAPENTIN 100 MG: 100 CAPSULE ORAL at 08:17

## 2022-05-11 RX ADMIN — BUSPIRONE HYDROCHLORIDE 30 MG: 15 TABLET ORAL at 08:17

## 2022-05-11 RX ADMIN — GABAPENTIN 100 MG: 100 CAPSULE ORAL at 12:04

## 2022-05-11 ASSESSMENT — ACTIVITIES OF DAILY LIVING (ADL)
ORAL_HYGIENE: PROMPTS
DRESS: PROMPTS;INDEPENDENT
HYGIENE/GROOMING: PROMPTS
ORAL_HYGIENE: PROMPTS;INDEPENDENT
LAUNDRY: UNABLE TO COMPLETE
DRESS: SCRUBS (BEHAVIORAL HEALTH)
LAUNDRY: UNABLE TO COMPLETE
HYGIENE/GROOMING: PROMPTS;INDEPENDENT

## 2022-05-11 NOTE — PLAN OF CARE
Problem: Sleep Disturbance (Psychotic Signs/Symptoms)  Goal: Improved Sleep (Psychotic Signs/Symptoms)  Outcome: Ongoing, Progressing   Goal Outcome Evaluation:    The patient slept for 9.75 hours. There were no safety concerns with the patient's breathing pattern. The fifteen-minute safety inspections are still going on without incident. No linked events with fall precaution.

## 2022-05-11 NOTE — PROGRESS NOTES
Patient has been seen and evaluated today.  He continues to be at his baseline and no new behaviors have been reported.  There has been no medication changes.    Ginger Dubon MD

## 2022-05-11 NOTE — PLAN OF CARE
Goal Outcome Evaluation:    Pt declines to get out of bed this AM, agreeable to taking AM medications and eating breakfast in bed. Pt gave minimal responses to questions asked, pt didn't respond to some assessment questions. Pt verbalized he hears voices in his head that are not command in nature and are not bothersome to pt. Pt came to lounge for lunch, gait slow and steady. Pt denies questions/concerns at this time. Will continue to monitor and encouraged activity.       Pt declines COVID swab at this time

## 2022-05-11 NOTE — PROGRESS NOTES
"PSYCHIATRY  PROGRESS NOTE     DATE OF SERVICE   05/11/2022       CHIEF COMPLAINT   Patient was admitted due to inability to safely care for himself and psychosis.       SUBJECTIVE   Nursing reports: Pt presents with blunted, flat affect and calm mood. Denies SI/SIB. Pt would not respond when asked about hallucinations, but pt does appear as if he is responding to internal stimuli. He denies depression and anxiety. Reports that he is at [facility] and that today's date is August 23rd 2099 or 3000. Pt does not know why he is here. Attempted to reorient pt, but he just stared at writer. He spent most of the evening isolated to his room, only coming out for dinner which he ate 75% of. Pt refused shower when offered. VSS. Medication compliant, refused cream for foot. Denies pain. No PRNs given. Gait steady and balanced, but pt was noted to be unsteady when standing up from table at dinner time. No other concerns or complaints noted.      Patient has been discussed with the  during team meeting.  Patient will discharge to a nursing home once guardianship is approved.  There has been no updates on the guardianship process.     OBJECTIVE   Patient was seen and evaluated at bedside by himself, this was a face-to-face evaluation.  Patient reported that he is feeling tired and does not want to get out of his room.  I informed the patient we are going to be moving him into a private room to what patient responded \"It is not necessary someone is picking me up in an hour.\"  Patient has a believe that he is leaving the hospital today.  I did discuss with the patient that it while he is still in the hospital I think it would be better if he can be in a private room.  Patient did not seem to understand but at the end he did agreed.       MEDICATIONS   Medications:  Scheduled Meds:    aspirin  81 mg Oral Daily     [Held by provider] atorvastatin  80 mg Oral At Bedtime     busPIRone HCl  30 mg Oral " "BID     cloZAPine  200 mg Oral At Bedtime     donepezil  10 mg Oral At Bedtime     gabapentin  100 mg Oral TID w/meals     haloperidol  1 mg Oral At Bedtime     levothyroxine  88 mcg Oral Daily     megestrol  20 mg Oral Daily     melatonin  5 mg Oral At Bedtime     memantine  10 mg Oral BID     mirtazapine  15 mg Oral At Bedtime     nystatin   Topical BID     salmeterol  1 puff Inhalation BID     Continuous Infusions:  PRN Meds:.[Held by provider] acetaminophen, albuterol, alum & mag hydroxide-simethicone, benzocaine-menthol, hydrOXYzine, nicotine, polyethylene glycol, polyethylene glycol-propylene glycol PF, risperiDONE, senna-docusate, [Held by provider] traZODone    Medication adherence issues: MS Med Adherence Y/N: Yes, Hospitalization  Medication side effects: MEDICATION SIDE EFFECTS: no side effects reported  Benefit: Yes / No: Yes       ROS   A comprehensive review of systems was negative.       MENTAL STATUS EXAM   Vitals: /68 (BP Location: Right arm)   Pulse 68   Temp 97.4  F (36.3  C) (Temporal)   Resp 16   Ht 1.778 m (5' 10\")   Wt 77.7 kg (171 lb 3.2 oz)   SpO2 96%   BMI 24.56 kg/m      Appearance:  No apparent distress  Mood: \"I am feeling tired.\"  Affect: Calm and pleasant  Suicidal Ideation: denies   Homicidal Ideation: denies   Thought process: concrete  Thought content: Remains confused and delusional.  He is responding to internal stimuli.  Fund of Knowledge: Below average  Attention/Concentration: Easily distracted  Language ability: Significantly impaired  Memory: Global memory impairment  Insight:  Poor.  Judgement: Poor  Orientation: Only to person  Psychomotor Behavior: slowed    Muscle Strength and Tone: MuscleStrength: Normal and Atrophy  Gait and Station: Not evaluated       LABS   personally reviewed.     No results found for: PHENYTOIN, PHENOBARB, VALPROATE, CBMZ       DIAGNOSIS   Principal Problem:    Major neurocognitive disorder, due to multiple etiologies, with behavioral " disturbance, severe (H)    Active Problem List:  Patient Active Problem List   Diagnosis     TBI with aggressive behavior     HTN (hypertension)     COPD (chronic obstructive pulmonary disease) (H)     Dementia with behavioral disturbance (H)     Chronic schizophrenia (H)     Smoker     Agitation     Behavior disturbance     Psychosis (H)     Major neurocognitive disorder, due to multiple etiologies, with behavioral disturbance, severe (H)     Paranoid schizophrenia, chronic condition with acute exacerbation (H)          PLAN   1. Ongoing education given regarding diagnostic and treatment options with risks, benefits and alternatives and adequate verbalization of understanding.  2.  Medications       BuSpar 30 mg 2 times daily       Clozaril 200 mg at bedtime       Aricept 10 mg at bedtime       Gabapentin 100 mg 3 times a day       Haldol 1 mg at bedtime       Namenda 10 mg 2 times a day       Remeron 15 mg at bedtime       melatonin 5 mg at bedtime  3.  Medical team following the patient   4.   coordinating a safe discharge plan with the patient.    Risk Assessment: [facility] RISK ASSESSMENT: Patient able to contract for safety    Coordination of Care:   Treatment Plan reviewed and physician signed, Care discussed with Care/Treatment Team Members, Chart reviewed and Patient seen      Re-Certification I certify that the inpatient psychiatric facility services furnished since the previous certification were, and continue to be, medically necessary for, either, treatment which could reasonably be expected to improve the patient s condition or diagnostic study and that the hospital records indicate that the services furnished were, either, intensive treatment services, admission and related services necessary for diagnostic study, or equivalent services.     I certify that the patient continues to need, on a daily basis, active treatment furnished directly by or requiring the supervision of inpatient  psychiatric facility personnel.   I estimate 14 days of hospitalization is necessary for proper treatment of the patient. My plans for post-hospital care for this patient are  TBD     Ginger Dubon MD    -     05/11/2022  -     12:25 PM    Total time 15 minutes with > 50%spent on coordination of cares and psycho-education.    This note was created with help of Dragon dictation system. Grammatical / typing errors are not intentional.    Ginger Dubon MD

## 2022-05-12 LAB — SARS-COV-2 RNA RESP QL NAA+PROBE: NEGATIVE

## 2022-05-12 PROCEDURE — U0005 INFEC AGEN DETEC AMPLI PROBE: HCPCS | Performed by: PSYCHIATRY & NEUROLOGY

## 2022-05-12 PROCEDURE — 99231 SBSQ HOSP IP/OBS SF/LOW 25: CPT | Performed by: PSYCHIATRY & NEUROLOGY

## 2022-05-12 PROCEDURE — 250N000013 HC RX MED GY IP 250 OP 250 PS 637: Performed by: PSYCHIATRY & NEUROLOGY

## 2022-05-12 PROCEDURE — 124N000003 HC R&B MH SENIOR/ADOLESCENT

## 2022-05-12 RX ADMIN — GABAPENTIN 100 MG: 100 CAPSULE ORAL at 07:59

## 2022-05-12 RX ADMIN — MIRTAZAPINE 15 MG: 15 TABLET, FILM COATED ORAL at 20:25

## 2022-05-12 RX ADMIN — CLOZAPINE 200 MG: 100 TABLET ORAL at 20:25

## 2022-05-12 RX ADMIN — DONEPEZIL HYDROCHLORIDE 10 MG: 10 TABLET ORAL at 20:25

## 2022-05-12 RX ADMIN — Medication 5 MG: at 20:25

## 2022-05-12 RX ADMIN — SALMETEROL XINAFOATE 1 PUFF: 50 POWDER, METERED ORAL; RESPIRATORY (INHALATION) at 07:59

## 2022-05-12 RX ADMIN — LEVOTHYROXINE SODIUM 88 MCG: 0.09 TABLET ORAL at 07:59

## 2022-05-12 RX ADMIN — SALMETEROL XINAFOATE 1 PUFF: 50 POWDER, METERED ORAL; RESPIRATORY (INHALATION) at 20:28

## 2022-05-12 RX ADMIN — HALOPERIDOL 1 MG: 1 TABLET ORAL at 20:25

## 2022-05-12 RX ADMIN — MEMANTINE 10 MG: 10 TABLET ORAL at 07:59

## 2022-05-12 RX ADMIN — BUSPIRONE HYDROCHLORIDE 30 MG: 15 TABLET ORAL at 20:24

## 2022-05-12 RX ADMIN — MEGESTROL ACETATE 20 MG: 20 TABLET ORAL at 07:59

## 2022-05-12 RX ADMIN — GABAPENTIN 100 MG: 100 CAPSULE ORAL at 17:27

## 2022-05-12 RX ADMIN — MEMANTINE 10 MG: 10 TABLET ORAL at 20:25

## 2022-05-12 RX ADMIN — ASPIRIN 81 MG: 81 TABLET, COATED ORAL at 07:59

## 2022-05-12 RX ADMIN — GABAPENTIN 100 MG: 100 CAPSULE ORAL at 12:20

## 2022-05-12 RX ADMIN — BUSPIRONE HYDROCHLORIDE 30 MG: 15 TABLET ORAL at 07:59

## 2022-05-12 ASSESSMENT — ACTIVITIES OF DAILY LIVING (ADL)
ORAL_HYGIENE: PROMPTS;INDEPENDENT
HYGIENE/GROOMING: PROMPTS
LAUNDRY: UNABLE TO COMPLETE
DRESS: INDEPENDENT
ORAL_HYGIENE: PROMPTS;INDEPENDENT
DRESS: INDEPENDENT
HYGIENE/GROOMING: PROMPTS
LAUNDRY: UNABLE TO COMPLETE

## 2022-05-12 NOTE — PLAN OF CARE
Goal Outcome Evaluation:    Plan of Care Reviewed With: patient     Patient continues to be withdrawn and isolative in his room. Came out from his room for meal only. Denies SI, SIB and hallucination. Denies anxiety and depression. Denies pain. He is alert to self only. He is medication complaint except refusing Nystatin cream for his feet. Appetite and sleep is good. Not attending group. He is not social with peers and staff. Continues to refuse shower. His gait was unsteady, reported dizziness when attempting to walk to the lounge at noon, staff used wheel chair, vitals were taken /66 with a . Repeat BP at 1:40 pm BP [id number] with a HR of 98, pt. denied dizziness at this time. Covid test sent to lab and resulted negative. Continue to monitor pt. as plan of care.

## 2022-05-12 NOTE — PROGRESS NOTES
Pt appeared to be sleeping a total of about 8 hours. No concerns reported or noted this shift. No PRN requested or given.

## 2022-05-12 NOTE — PLAN OF CARE
"Goal Outcome Evaluation:    Plan of Care Reviewed With: patient     Patient spent the shift in his room. He is isolative and withdrawn. His affect is flat. His mood is calm. He denies SI and SIB. He endorses AH stating \"the voices are good\". He denies depression. He endorses anxiety stating \"always\". He refused to attend groups. He came out of his room for dinner only and ate 100%. He is alert to self only. He states it is June 19th 2099. His speech is garbled and soft. He refused to come out of his room for the [employer] warning stating he was \"too tired\". He is not social with peers or staff. He only speaks when spoken to. He is disheveled and refused to shower. His gait is unsteady when he gets out of bed. He is encouraged to drink fluids. He denies pain. He refused Covid swab.                 "

## 2022-05-12 NOTE — PROGRESS NOTES
"PSYCHIATRY  PROGRESS NOTE     DATE OF SERVICE   05/12/2022       CHIEF COMPLAINT   Patient was admitted due to inability to safely care for himself and psychosis.       SUBJECTIVE   Nursing reports: Patient spent the shift in his room. He is isolative and withdrawn. His affect is flat. His mood is calm. He denies SI and SIB. He endorses AH stating \"the voices are good\". He denies depression. He endorses anxiety stating \"always\". He refused to attend groups. He came out of his room for dinner only and ate 100%. He is alert to self only. He states it is June 19th 2099. His speech is garbled and soft. He refused to come out of his room for the [place] warning stating he was \"too tired\". He is not social with peers or staff. He only speaks when spoken to. He is disheveled and refused to shower. His gait is unsteady when he gets out of bed. He is encouraged to drink fluids. He denies pain. He refused Covid swab.      Patient has been discussed with the  during team meeting.  Patient will discharge to a nursing home once guardianship is approved.  There has been no updates on the guardianship process.     OBJECTIVE   Patient was seen and evaluated at bedside by himself, this was a face-to-face evaluation.  Patient continues to be at his baseline and there has been no new behaviors.  He continues to have the believe that he has a mansion and that he will be leaving the hospital today.  He was also asking to be able to go outside and smoke a cigarette.  Patient has been reminded that there is a no smoking policy in the hospital.  Other than that he denies any other problems.  Continues to denied having thoughts of harming himself and he is shayan for safety.       MEDICATIONS   Medications:  Scheduled Meds:    aspirin  81 mg Oral Daily     [Held by provider] atorvastatin  80 mg Oral At Bedtime     busPIRone HCl  30 mg Oral BID     cloZAPine  200 mg Oral At Bedtime     donepezil  10 mg Oral At Bedtime     " "gabapentin  100 mg Oral TID w/meals     haloperidol  1 mg Oral At Bedtime     levothyroxine  88 mcg Oral Daily     megestrol  20 mg Oral Daily     melatonin  5 mg Oral At Bedtime     memantine  10 mg Oral BID     mirtazapine  15 mg Oral At Bedtime     nystatin   Topical BID     salmeterol  1 puff Inhalation BID     Continuous Infusions:  PRN Meds:.[Held by provider] acetaminophen, albuterol, alum & mag hydroxide-simethicone, benzocaine-menthol, hydrOXYzine, nicotine, polyethylene glycol, polyethylene glycol-propylene glycol PF, risperiDONE, senna-docusate, [Held by provider] traZODone    Medication adherence issues: MS Med Adherence Y/N: Yes, Hospitalization  Medication side effects: MEDICATION SIDE EFFECTS: no side effects reported  Benefit: Yes / No: Yes       ROS   A comprehensive review of systems was negative.       MENTAL STATUS EXAM   Vitals: /66 (BP Location: Left arm, Patient Position: Sitting)   Pulse 115   Temp 97.6  F (36.4  C) (Oral)   Resp 16   Ht 1.778 m (5' 10\")   Wt 77.6 kg (171 lb)   SpO2 99%   BMI 24.54 kg/m      Appearance:  No apparent distress  Mood: \"I am okay.\"  Affect: Calm and pleasant  Suicidal Ideation: denies   Homicidal Ideation: denies   Thought process: concrete  Thought content: Remains confused and delusional.  He is responding to internal stimuli.  Fund of Knowledge: Below average  Attention/Concentration: Easily distracted  Language ability: Significantly impaired  Memory: Global memory impairment  Insight:  Poor.  Judgement: Poor  Orientation: Only to person  Psychomotor Behavior: slowed    Muscle Strength and Tone: MuscleStrength: Normal and Atrophy  Gait and Station: Not evaluated       LABS   personally reviewed.     No results found for: PHENYTOIN, PHENOBARB, VALPROATE, CBMZ       DIAGNOSIS   Principal Problem:    Major neurocognitive disorder, due to multiple etiologies, with behavioral disturbance, severe (H)    Active Problem List:  Patient Active Problem List "   Diagnosis     TBI with aggressive behavior     HTN (hypertension)     COPD (chronic obstructive pulmonary disease) (H)     Dementia with behavioral disturbance (H)     Chronic schizophrenia (H)     Smoker     Agitation     Behavior disturbance     Psychosis (H)     Major neurocognitive disorder, due to multiple etiologies, with behavioral disturbance, severe (H)     Paranoid schizophrenia, chronic condition with acute exacerbation (H)          PLAN   1. Ongoing education given regarding diagnostic and treatment options with risks, benefits and alternatives and adequate verbalization of understanding.  2.  Medications       BuSpar 30 mg 2 times daily       Clozaril 200 mg at bedtime       Aricept 10 mg at bedtime       Gabapentin 100 mg 3 times a day       Haldol 1 mg at bedtime       Namenda 10 mg 2 times a day       Remeron 15 mg at bedtime       melatonin 5 mg at bedtime  3.  Medical team following the patient   4.   coordinating a safe discharge plan with the patient.    Risk Assessment: [facility] RISK ASSESSMENT: Patient able to contract for safety    Coordination of Care:   Treatment Plan reviewed and physician signed, Care discussed with Care/Treatment Team Members, Chart reviewed and Patient seen      Re-Certification I certify that the inpatient psychiatric facility services furnished since the previous certification were, and continue to be, medically necessary for, either, treatment which could reasonably be expected to improve the patient s condition or diagnostic study and that the hospital records indicate that the services furnished were, either, intensive treatment services, admission and related services necessary for diagnostic study, or equivalent services.     I certify that the patient continues to need, on a daily basis, active treatment furnished directly by or requiring the supervision of inpatient psychiatric facility personnel.   I estimate 14 days of hospitalization is  necessary for proper treatment of the patient. My plans for post-hospital care for this patient are  TBD     Ginger Dubon MD    -     05/12/2022  -     12:36 PM    Total time 15 minutes with > 50%spent on coordination of cares and psycho-education.    This note was created with help of Dragon dictation system. Grammatical / typing errors are not intentional.    Ginger Dubon MD

## 2022-05-13 LAB
BASOPHILS # BLD AUTO: 0.1 10E3/UL (ref 0–0.2)
BASOPHILS NFR BLD AUTO: 1 %
EOSINOPHIL # BLD AUTO: 1.3 10E3/UL (ref 0–0.7)
EOSINOPHIL NFR BLD AUTO: 18 %
ERYTHROCYTE [DISTWIDTH] IN BLOOD BY AUTOMATED COUNT: 13.8 % (ref 10–15)
HCT VFR BLD AUTO: 40.5 % (ref 40–53)
HGB BLD-MCNC: 14.1 G/DL (ref 13.3–17.7)
IMM GRANULOCYTES # BLD: 0 10E3/UL
IMM GRANULOCYTES NFR BLD: 0 %
LYMPHOCYTES # BLD AUTO: 1.6 10E3/UL (ref 0.8–5.3)
LYMPHOCYTES NFR BLD AUTO: 24 %
MCH RBC QN AUTO: 32 PG (ref 26.5–33)
MCHC RBC AUTO-ENTMCNC: 34.8 G/DL (ref 31.5–36.5)
MCV RBC AUTO: 92 FL (ref 78–100)
MONOCYTES # BLD AUTO: 0.5 10E3/UL (ref 0–1.3)
MONOCYTES NFR BLD AUTO: 7 %
NEUTROPHILS # BLD AUTO: 3.5 10E3/UL (ref 1.6–8.3)
NEUTROPHILS NFR BLD AUTO: 50 %
NRBC # BLD AUTO: 0 10E3/UL
NRBC BLD AUTO-RTO: 0 /100
PLATELET # BLD AUTO: 173 10E3/UL (ref 150–450)
RBC # BLD AUTO: 4.41 10E6/UL (ref 4.4–5.9)
WBC # BLD AUTO: 6.9 10E3/UL (ref 4–11)

## 2022-05-13 PROCEDURE — 250N000013 HC RX MED GY IP 250 OP 250 PS 637: Performed by: PSYCHIATRY & NEUROLOGY

## 2022-05-13 PROCEDURE — 36415 COLL VENOUS BLD VENIPUNCTURE: CPT | Performed by: PSYCHIATRY & NEUROLOGY

## 2022-05-13 PROCEDURE — 124N000003 HC R&B MH SENIOR/ADOLESCENT

## 2022-05-13 PROCEDURE — 85025 COMPLETE CBC W/AUTO DIFF WBC: CPT | Performed by: PSYCHIATRY & NEUROLOGY

## 2022-05-13 PROCEDURE — 99231 SBSQ HOSP IP/OBS SF/LOW 25: CPT | Performed by: PSYCHIATRY & NEUROLOGY

## 2022-05-13 RX ADMIN — SALMETEROL XINAFOATE 1 PUFF: 50 POWDER, METERED ORAL; RESPIRATORY (INHALATION) at 20:04

## 2022-05-13 RX ADMIN — CLOZAPINE 200 MG: 100 TABLET ORAL at 20:04

## 2022-05-13 RX ADMIN — Medication 5 MG: at 20:04

## 2022-05-13 RX ADMIN — LEVOTHYROXINE SODIUM 88 MCG: 0.09 TABLET ORAL at 08:46

## 2022-05-13 RX ADMIN — MIRTAZAPINE 15 MG: 15 TABLET, FILM COATED ORAL at 20:04

## 2022-05-13 RX ADMIN — NYSTATIN: 100000 CREAM TOPICAL at 08:47

## 2022-05-13 RX ADMIN — GABAPENTIN 100 MG: 100 CAPSULE ORAL at 17:40

## 2022-05-13 RX ADMIN — BUSPIRONE HYDROCHLORIDE 30 MG: 15 TABLET ORAL at 20:04

## 2022-05-13 RX ADMIN — ASPIRIN 81 MG: 81 TABLET, COATED ORAL at 08:45

## 2022-05-13 RX ADMIN — HALOPERIDOL 1 MG: 1 TABLET ORAL at 20:04

## 2022-05-13 RX ADMIN — MEMANTINE 10 MG: 10 TABLET ORAL at 08:46

## 2022-05-13 RX ADMIN — DONEPEZIL HYDROCHLORIDE 10 MG: 10 TABLET ORAL at 20:04

## 2022-05-13 RX ADMIN — GABAPENTIN 100 MG: 100 CAPSULE ORAL at 13:37

## 2022-05-13 RX ADMIN — MEGESTROL ACETATE 20 MG: 20 TABLET ORAL at 08:45

## 2022-05-13 RX ADMIN — GABAPENTIN 100 MG: 100 CAPSULE ORAL at 08:46

## 2022-05-13 RX ADMIN — BUSPIRONE HYDROCHLORIDE 30 MG: 15 TABLET ORAL at 08:46

## 2022-05-13 RX ADMIN — MEMANTINE 10 MG: 10 TABLET ORAL at 20:04

## 2022-05-13 RX ADMIN — SALMETEROL XINAFOATE 1 PUFF: 50 POWDER, METERED ORAL; RESPIRATORY (INHALATION) at 08:46

## 2022-05-13 ASSESSMENT — ACTIVITIES OF DAILY LIVING (ADL)
ORAL_HYGIENE: WITH ASSISTANCE;PROMPTS
DRESS: SCRUBS (BEHAVIORAL HEALTH)
HYGIENE/GROOMING: INDEPENDENT;WITH ASSISTANCE

## 2022-05-13 NOTE — PROGRESS NOTES
PSYCHIATRY  PROGRESS NOTE     DATE OF SERVICE   05/13/2022       CHIEF COMPLAINT   Patient was admitted due to inability to safely care for himself and psychosis.       SUBJECTIVE   Nursing reports: Patient continues to be withdrawn and isolative in his room. Patient  came to the lounge for meal only. Denies SI, SIB and hallucination. Denies anxiety and depression. VSS. Denies pain. Denies dizziness and light headedness this afternoon. He is medication complaint except refusing cream for his feet. Appetite and sleep is good. Not attending group. He is not social with peers and staff. He is alert to self only, stated he is in [facility]. Continues to refuse shower. Continue to monitor pt. as plan of care.     Patient has been discussed with the  during team meeting.   informed me that the petition for guardianship still has not been filed.     OBJECTIVE   Patient was seen and evaluated at bedside by himself, this was a face-to-face evaluation.  Patient reported that he is doing okay and at this time he continues to deny all psychiatric symptoms.  He continues to respond to internal stimuli and I cannot see him talking to himself.  He is denying having any thoughts of harming himself or harming others and has been shayan for safety.  Patient continues to verbalize feeling tired but denies feeling dizzy.  Remains isolative in his room said that he is not interested with interacting with his peers.     MEDICATIONS   Medications:  Scheduled Meds:    aspirin  81 mg Oral Daily     [Held by provider] atorvastatin  80 mg Oral At Bedtime     busPIRone HCl  30 mg Oral BID     cloZAPine  200 mg Oral At Bedtime     donepezil  10 mg Oral At Bedtime     gabapentin  100 mg Oral TID w/meals     haloperidol  1 mg Oral At Bedtime     levothyroxine  88 mcg Oral Daily     megestrol  20 mg Oral Daily     melatonin  5 mg Oral At Bedtime     memantine  10 mg Oral BID     mirtazapine  15  "mg Oral At Bedtime     nystatin   Topical BID     salmeterol  1 puff Inhalation BID     Continuous Infusions:  PRN Meds:.[Held by provider] acetaminophen, albuterol, alum & mag hydroxide-simethicone, benzocaine-menthol, hydrOXYzine, nicotine, polyethylene glycol, polyethylene glycol-propylene glycol PF, risperiDONE, senna-docusate, [Held by provider] traZODone    Medication adherence issues: MS Med Adherence Y/N: Yes, Hospitalization  Medication side effects: MEDICATION SIDE EFFECTS: no side effects reported  Benefit: Yes / No: Yes       ROS   A comprehensive review of systems was negative.       MENTAL STATUS EXAM   Vitals: /70 (BP Location: Left arm, Patient Position: Right side, Cuff Size: Adult Regular)   Pulse 87   Temp 99  F (37.2  C) (Oral)   Resp 16   Ht 1.778 m (5' 10\")   Wt 77.6 kg (171 lb)   SpO2 99%   BMI 24.54 kg/m      Same as last visit  Appearance:  No apparent distress  Mood: \"I am okay.\"  Affect: Calm and pleasant  Suicidal Ideation: denies   Homicidal Ideation: denies   Thought process: concrete  Thought content: Remains confused and delusional.  He is responding to internal stimuli.  Fund of Knowledge: Below average  Attention/Concentration: Easily distracted  Language ability: Significantly impaired  Memory: Global memory impairment  Insight:  Poor.  Judgement: Poor  Orientation: Only to person  Psychomotor Behavior: slowed    Muscle Strength and Tone: MuscleStrength: Normal and Atrophy  Gait and Station: Not evaluated       LABS   personally reviewed.     No results found for: PHENYTOIN, PHENOBARB, VALPROATE, CBMZ       DIAGNOSIS   Principal Problem:    Major neurocognitive disorder, due to multiple etiologies, with behavioral disturbance, severe (H)    Active Problem List:  Patient Active Problem List   Diagnosis     TBI with aggressive behavior     HTN (hypertension)     COPD (chronic obstructive pulmonary disease) (H)     Dementia with behavioral disturbance (H)     Chronic " schizophrenia (H)     Smoker     Agitation     Behavior disturbance     Psychosis (H)     Major neurocognitive disorder, due to multiple etiologies, with behavioral disturbance, severe (H)     Paranoid schizophrenia, chronic condition with acute exacerbation (H)          PLAN   1. Ongoing education given regarding diagnostic and treatment options with risks, benefits and alternatives and adequate verbalization of understanding.  2.  Medications       BuSpar 30 mg 2 times daily       Clozaril 200 mg at bedtime       Aricept 10 mg at bedtime       Gabapentin 100 mg 3 times a day       Haldol 1 mg at bedtime       Namenda 10 mg 2 times a day       Remeron 15 mg at bedtime       melatonin 5 mg at bedtime  3.  Medical team following the patient   4.   coordinating a safe discharge plan with the patient.    Risk Assessment: [facility] RISK ASSESSMENT: Patient able to contract for safety    Coordination of Care:   Treatment Plan reviewed and physician signed, Care discussed with Care/Treatment Team Members, Chart reviewed and Patient seen      Re-Certification I certify that the inpatient psychiatric facility services furnished since the previous certification were, and continue to be, medically necessary for, either, treatment which could reasonably be expected to improve the patient s condition or diagnostic study and that the hospital records indicate that the services furnished were, either, intensive treatment services, admission and related services necessary for diagnostic study, or equivalent services.     I certify that the patient continues to need, on a daily basis, active treatment furnished directly by or requiring the supervision of inpatient psychiatric facility personnel.   I estimate 14 days of hospitalization is necessary for proper treatment of the patient. My plans for post-hospital care for this patient are  TBD     Ginger Dubon MD    -     05/13/2022  -     12:42 PM    Total time  15 minutes with > 50%spent on coordination of cares and psycho-education.    This note was created with help of Dragon dictation system. Grammatical / typing errors are not intentional.    Ginger Dubon MD

## 2022-05-13 NOTE — PLAN OF CARE
Goal Outcome Evaluation:    Plan of Care Reviewed With: patient     Pt continues to isolate to room except for meals. Ate > 75% of dinner. Poverty of thought and speech. One-word responses. Oriented to self only. Affect flat. Poor eye contact. Hygiene poor. Gait steady. Med-compliant. Continue to encourage hydration. Continue to encourage pt to allow assistance with hygiene. Continue with current treatment plan and recommendations. Continue to monitor and reassess symptoms. Monitor response to medications. Monitor progress towards treatment goals. Encourage groups and participation

## 2022-05-13 NOTE — PLAN OF CARE
"  Problem: Behavior Regulation Impairment (Psychotic Signs/Symptoms)  Goal: Improved Behavioral Control (Psychotic Signs/Symptoms)  Outcome: Met   Goal Outcome Evaluation:    Plan of Care Reviewed With: patient               Nursing Assessment    Psychosis (H) [F29]    Admit Date: 1/26/2022    Length of Stay: 107    Patient evaluation continues. Assessed mood,anxiety,thoughts and behavior. Patient is  progressing towards goals. Patient is encouraged to participate in groups and assisted to develop healthy coping skills.  Patient has  auditory hallucinations. /70 (BP Location: Left arm, Patient Position: Right side, Cuff Size: Adult Regular)   Pulse 87   Temp 99  F (37.2  C) (Oral)   Resp 16   Ht 1.778 m (5' 10\")   Wt 77.6 kg (171 lb)   SpO2 99%   BMI 24.54 kg/m      Mood: ok    Patient reports depression none  and reports anxiety none    Affect: flat blunted    Sleep:good 7 hours last night    Appetite: good    SI: denies    HI: denies    SIB: denies      Medication Compliance yes    Group participation:none    ADL's: assisted at times refuses cares    Fall risk interventions: proper foot wear, orthostatic B/P assist with ambulation if needed    Rinku Score Interventions:none    Discharge planning awaiting placement    Refer to daily team meeting notes for individualized plan of care. Nursing will continue to assess.    *Scale is 1-10 and 10 is the worst.         "

## 2022-05-13 NOTE — PLAN OF CARE
Goal Outcome Evaluation:    Plan of Care Reviewed With: patient     Patient continues to be withdrawn and isolative in his room. Patient  came to the lounge for meal only. Denies SI, SIB and hallucination. Denies anxiety and depression. VSS. Denies pain. Denies dizziness and light headedness this afternoon. He is medication complaint except refusing cream for his feet. Appetite and sleep is good. Not attending group. He is not social with peers and staff. He is alert to self only, stated he is in [facility]. Continues to refuse shower. Continue to monitor pt. as plan of care.

## 2022-05-14 PROCEDURE — 250N000013 HC RX MED GY IP 250 OP 250 PS 637: Performed by: PSYCHIATRY & NEUROLOGY

## 2022-05-14 PROCEDURE — 124N000003 HC R&B MH SENIOR/ADOLESCENT

## 2022-05-14 RX ADMIN — MIRTAZAPINE 15 MG: 15 TABLET, FILM COATED ORAL at 20:31

## 2022-05-14 RX ADMIN — GABAPENTIN 100 MG: 100 CAPSULE ORAL at 13:19

## 2022-05-14 RX ADMIN — GABAPENTIN 100 MG: 100 CAPSULE ORAL at 08:52

## 2022-05-14 RX ADMIN — SALMETEROL XINAFOATE 1 PUFF: 50 POWDER, METERED ORAL; RESPIRATORY (INHALATION) at 08:53

## 2022-05-14 RX ADMIN — SALMETEROL XINAFOATE 1 PUFF: 50 POWDER, METERED ORAL; RESPIRATORY (INHALATION) at 20:32

## 2022-05-14 RX ADMIN — MEMANTINE 10 MG: 10 TABLET ORAL at 20:32

## 2022-05-14 RX ADMIN — MEMANTINE 10 MG: 10 TABLET ORAL at 08:52

## 2022-05-14 RX ADMIN — ASPIRIN 81 MG: 81 TABLET, COATED ORAL at 08:52

## 2022-05-14 RX ADMIN — DONEPEZIL HYDROCHLORIDE 10 MG: 10 TABLET ORAL at 20:32

## 2022-05-14 RX ADMIN — BUSPIRONE HYDROCHLORIDE 30 MG: 15 TABLET ORAL at 08:52

## 2022-05-14 RX ADMIN — GABAPENTIN 100 MG: 100 CAPSULE ORAL at 17:39

## 2022-05-14 RX ADMIN — BUSPIRONE HYDROCHLORIDE 30 MG: 15 TABLET ORAL at 20:31

## 2022-05-14 RX ADMIN — Medication 5 MG: at 20:32

## 2022-05-14 RX ADMIN — MEGESTROL ACETATE 20 MG: 20 TABLET ORAL at 08:52

## 2022-05-14 RX ADMIN — CLOZAPINE 200 MG: 100 TABLET ORAL at 20:32

## 2022-05-14 RX ADMIN — HALOPERIDOL 1 MG: 1 TABLET ORAL at 20:32

## 2022-05-14 RX ADMIN — LEVOTHYROXINE SODIUM 88 MCG: 0.09 TABLET ORAL at 08:52

## 2022-05-14 ASSESSMENT — ACTIVITIES OF DAILY LIVING (ADL)
DRESS: WITH ASSISTANCE;PROMPTS
ORAL_HYGIENE: WITH ASSISTANCE;PROMPTS
HYGIENE/GROOMING: WITH ASSISTANCE;PROMPTS

## 2022-05-14 NOTE — PLAN OF CARE
Problem: Sleep Disturbance (Psychotic Signs/Symptoms)  Goal: Improved Sleep (Psychotic Signs/Symptoms)  Outcome: Ongoing, Progressing   Goal Outcome Evaluation:     Patient slept a total of 8.5 hours. No behavioral concerns raised the whole shift.

## 2022-05-14 NOTE — PLAN OF CARE
"Goal Outcome Evaluation:    Plan of Care Reviewed With: patient             Problem: Behavior Regulation Impairment (Psychotic Signs/Symptoms)  Goal: Improved Behavioral Control (Psychotic Signs/Symptoms)  Outcome: Met     Nursing Assessment    Psychosis (H) [F29]    Admit Date: 1/26/2022    Length of Stay: 108    Patient evaluation continues. Assessed mood,anxiety,thoughts and behavior. Patient is  progressing towards goals. Patient is encouraged to participate in groups and assisted to develop healthy coping skills.  Patient admits to auditory  hallucinations. /71 (BP Location: Right arm, Patient Position: Supine)   Pulse 89   Temp 98.2  F (36.8  C) (Oral)   Resp 20   Ht 1.778 m (5' 10\")   Wt 77.6 kg (171 lb)   SpO2 97%   BMI 24.54 kg/m      Mood: ok as reported by pt    Patient reports depression 0/10 and reports anxiety 0/10    Affect:flat blunted    Sleep: 9-10 hours each night    Appetite: good    SI: denies    HI: denies    SIB: denies      Medication Compliance yes    Group participation:none    ADL's: encourage and assist as needed. Prompts. Pt has been refusing consistently again to take a shower. Pt agreed to a bed bath, washed his private areas and writer washed back and underarms. Pt had soiled pull ups which were changed. New scrubs obtained and pt put them on per self.     Fall risk interventions: proper foot wear, orthostatic B/p,     Rinku Score Interventions:none    Discharge planning in process    Refer to daily team meeting notes for individualized plan of care. Nursing will continue to assess.    *Scale is 1-10 and 10 is the worst.           "

## 2022-05-14 NOTE — PLAN OF CARE
Goal Outcome Evaluation:    Plan of Care Reviewed With: patient        Pt continues to isolate to room except for meals. Ate > 75% of dinner. Poverty of thought and speech. One-word responses. Oriented to self only. Affect flat. Poor eye contact. Refused shower x 3 per day shift report but did allow RN to assist him with thorough bed bath and clothing change. Gait steady. Med-compliant. Continue to encourage hydration. Continue to encourage pt to allow assistance with hygiene. Continue with current treatment plan and recommendations. Continue to monitor and reassess symptoms. Monitor response to medications. Monitor progress towards treatment goals. Encourage groups and participation

## 2022-05-15 PROCEDURE — 250N000013 HC RX MED GY IP 250 OP 250 PS 637: Performed by: PSYCHIATRY & NEUROLOGY

## 2022-05-15 PROCEDURE — 124N000003 HC R&B MH SENIOR/ADOLESCENT

## 2022-05-15 RX ADMIN — GABAPENTIN 100 MG: 100 CAPSULE ORAL at 08:22

## 2022-05-15 RX ADMIN — BUSPIRONE HYDROCHLORIDE 30 MG: 15 TABLET ORAL at 08:21

## 2022-05-15 RX ADMIN — MIRTAZAPINE 15 MG: 15 TABLET, FILM COATED ORAL at 20:40

## 2022-05-15 RX ADMIN — LEVOTHYROXINE SODIUM 88 MCG: 0.09 TABLET ORAL at 08:22

## 2022-05-15 RX ADMIN — GABAPENTIN 100 MG: 100 CAPSULE ORAL at 17:29

## 2022-05-15 RX ADMIN — CLOZAPINE 200 MG: 100 TABLET ORAL at 20:39

## 2022-05-15 RX ADMIN — MEMANTINE 10 MG: 10 TABLET ORAL at 20:40

## 2022-05-15 RX ADMIN — ASPIRIN 81 MG: 81 TABLET, COATED ORAL at 08:21

## 2022-05-15 RX ADMIN — SALMETEROL XINAFOATE 1 PUFF: 50 POWDER, METERED ORAL; RESPIRATORY (INHALATION) at 20:40

## 2022-05-15 RX ADMIN — SALMETEROL XINAFOATE 1 PUFF: 50 POWDER, METERED ORAL; RESPIRATORY (INHALATION) at 08:22

## 2022-05-15 RX ADMIN — Medication 5 MG: at 20:40

## 2022-05-15 RX ADMIN — GABAPENTIN 100 MG: 100 CAPSULE ORAL at 12:39

## 2022-05-15 RX ADMIN — BUSPIRONE HYDROCHLORIDE 30 MG: 15 TABLET ORAL at 20:40

## 2022-05-15 RX ADMIN — MEGESTROL ACETATE 20 MG: 20 TABLET ORAL at 08:22

## 2022-05-15 RX ADMIN — HALOPERIDOL 1 MG: 1 TABLET ORAL at 20:39

## 2022-05-15 RX ADMIN — MEMANTINE 10 MG: 10 TABLET ORAL at 08:22

## 2022-05-15 RX ADMIN — DONEPEZIL HYDROCHLORIDE 10 MG: 10 TABLET ORAL at 20:39

## 2022-05-15 ASSESSMENT — ACTIVITIES OF DAILY LIVING (ADL)
DRESS: WITH ASSISTANCE
HYGIENE/GROOMING: WITH ASSISTANCE;PROMPTS
ORAL_HYGIENE: PROMPTS
ORAL_HYGIENE: WITH ASSISTANCE;PROMPTS
DRESS: WITH ASSISTANCE;PROMPTS
HYGIENE/GROOMING: WITH ASSISTANCE

## 2022-05-15 NOTE — PLAN OF CARE
Goal Outcome Evaluation:  Patient has remained in his room sleeping  for the entire shift. No concerns reported.  10 hrs of sleep.

## 2022-05-15 NOTE — PLAN OF CARE
Goal Outcome Evaluation:    Plan of Care Reviewed With: patient        Pt continues to isolate to room except for meals. Ate > 75% of dinner. Poverty of thought and speech. One-word responses. Oriented to self only. Affect flat. Poor eye contact. Refused shower x 3 per yesterday but did allow RN to assist him with thorough bed bath and clothing change yesterday. Gait steady. Med-compliant. Continue to encourage hydration. Continue to encourage pt to allow assistance with hygiene. Continue with current treatment plan and recommendations. Continue to monitor and reassess symptoms. Monitor response to medications. Monitor progress towards treatment goals. Encourage groups and participation

## 2022-05-15 NOTE — PLAN OF CARE
"  Problem: Depression  Goal: Improved Mood  Outcome: Met   Goal Outcome Evaluation:    Plan of Care Reviewed With: patient               Nursing Assessment    Psychosis (H) [F29]    Admit Date: 1/26/2022    Length of Stay: 109    Patient evaluation continues. Assessed mood,anxiety,thoughts and behavior. Patient is  progressing towards goals. Patient is encouraged to participate in groups and assisted to develop healthy coping skills.  Patient admits to talking with his sister experiencing auditory hallucinations. BP 93/64   Pulse 96   Temp 97.7  F (36.5  C) (Oral)   Resp 20   Ht 1.778 m (5' 10\")   Wt 77.6 kg (171 lb)   SpO2 98%   BMI 24.54 kg/m      Mood: good today    Patient reports depression none and reports anxiety little    Affect:flat blunted    Sleep:8-9 hours at night    Appetite: good    SI: denies    HI: denies    SIB: denies      Medication Compliance yes    Group participation:none, refuses    ADL's: assist and prompt, refuses at times     Fall risk interventions: proper foot wear, orthostatic B/P    Rinku Score Interventions: none    Discharge planning in process    Refer to daily team meeting notes for individualized plan of care. Nursing will continue to assess.    *Scale is 1-10 and 10 is the worst.         "

## 2022-05-16 PROCEDURE — 250N000013 HC RX MED GY IP 250 OP 250 PS 637: Performed by: PSYCHIATRY & NEUROLOGY

## 2022-05-16 PROCEDURE — 124N000003 HC R&B MH SENIOR/ADOLESCENT

## 2022-05-16 PROCEDURE — 99231 SBSQ HOSP IP/OBS SF/LOW 25: CPT | Performed by: PSYCHIATRY & NEUROLOGY

## 2022-05-16 RX ADMIN — MEGESTROL ACETATE 20 MG: 20 TABLET ORAL at 08:29

## 2022-05-16 RX ADMIN — LEVOTHYROXINE SODIUM 88 MCG: 0.09 TABLET ORAL at 08:28

## 2022-05-16 RX ADMIN — SALMETEROL XINAFOATE 1 PUFF: 50 POWDER, METERED ORAL; RESPIRATORY (INHALATION) at 19:55

## 2022-05-16 RX ADMIN — CLOZAPINE 200 MG: 100 TABLET ORAL at 19:55

## 2022-05-16 RX ADMIN — SALMETEROL XINAFOATE 1 PUFF: 50 POWDER, METERED ORAL; RESPIRATORY (INHALATION) at 08:29

## 2022-05-16 RX ADMIN — MIRTAZAPINE 15 MG: 15 TABLET, FILM COATED ORAL at 19:56

## 2022-05-16 RX ADMIN — GABAPENTIN 100 MG: 100 CAPSULE ORAL at 08:28

## 2022-05-16 RX ADMIN — GABAPENTIN 100 MG: 100 CAPSULE ORAL at 12:28

## 2022-05-16 RX ADMIN — MEMANTINE 10 MG: 10 TABLET ORAL at 19:55

## 2022-05-16 RX ADMIN — BUSPIRONE HYDROCHLORIDE 30 MG: 15 TABLET ORAL at 08:29

## 2022-05-16 RX ADMIN — HALOPERIDOL 1 MG: 1 TABLET ORAL at 19:56

## 2022-05-16 RX ADMIN — Medication 5 MG: at 19:55

## 2022-05-16 RX ADMIN — DONEPEZIL HYDROCHLORIDE 10 MG: 10 TABLET ORAL at 19:55

## 2022-05-16 RX ADMIN — BUSPIRONE HYDROCHLORIDE 30 MG: 15 TABLET ORAL at 19:55

## 2022-05-16 RX ADMIN — MEMANTINE 10 MG: 10 TABLET ORAL at 08:28

## 2022-05-16 RX ADMIN — GABAPENTIN 100 MG: 100 CAPSULE ORAL at 17:25

## 2022-05-16 RX ADMIN — ASPIRIN 81 MG: 81 TABLET, COATED ORAL at 08:28

## 2022-05-16 ASSESSMENT — ACTIVITIES OF DAILY LIVING (ADL)
HYGIENE/GROOMING: PROMPTS
DRESS: WITH ASSISTANCE;PROMPTS
ORAL_HYGIENE: PROMPTS
HYGIENE/GROOMING: PROMPTS;INDEPENDENT
LAUNDRY: UNABLE TO COMPLETE
ORAL_HYGIENE: PROMPTS
DRESS: SCRUBS (BEHAVIORAL HEALTH)
LAUNDRY: UNABLE TO COMPLETE

## 2022-05-16 NOTE — PLAN OF CARE
05/16/22 1402   Individualization/Patient Specific Goals   Patient Personal Strengths medication/treatment adherence   Patient Vulnerabilities lacks insight into illness   Anxieties, Fears or Concerns Patient does not have a strong support network.   Individualized Care Needs Symptom reduction and stabilization.   Patient-Specific Goals (Include Timeframe) Patient will continue to reduce symptoms and stabilize. 1-2 months. Guardianship is currently processing.   Interprofessional Rounds   Summary Patient's care was discussed in team meeting. Patient continues to make progress. Team is waiting on patient's guardianship to be processed. His guardianship has not yet been filed. The hospital is assisting in the process. Managers are aware and assisting.   Participants nursing;psychiatrist;[facility]   Team Discussion   Participants Dr. Root [facility] Jennifer Zhou, JENNIFER   Progress Patient is making progress.   Anticipated length of stay 1-2 months   Continued Stay Criteria/Rationale Guardianship is being processed. Patient can not discharge without guardianship and placement. Patient needs continued stabilization.   Medical/Physical Please see H and P.   Precautions Fall   Plan Patient will receive medication management, psychiatric evaluations, nursing assessments, group therapy, milieu therapy, and [facility] case management. Guardianship is processing, once finalized, patient can discharge to assisted living - pending stabilization.   Safety Plan s-15, code 2 and 3, commited with p[place]man   Anticipated Discharge Disposition assisted living

## 2022-05-16 NOTE — PLAN OF CARE
Goal Outcome Evaluation:    Patient has remained in his room sleeping for the entire shift.  10 hrs of sleep.  No concerns reported.

## 2022-05-16 NOTE — PLAN OF CARE
Care coordination:  - Patient's care was discussed in team meeting.  - Writer has not received new information regarding patient's guardianship process. Managers and legal are involved in the case. Patient outliers' meeting is tomorrow. Writer will discuss the case with managers and the team.     Referrals:  - Patient was accepted to [facility]. Contact: [email or web address]    Barriers to discharge:  - Legal guardianship is a barrier, stabilization.

## 2022-05-16 NOTE — PLAN OF CARE
Goal Outcome Evaluation:    Plan of Care Reviewed With: patient     Patient was withdrawn and isolative in his room, refused to came out for meal, meal provided to his room and ate 75% of his meal. His affect is flat/blunted in a calm mood. Denies SI, SIB and hallucination. Denies anxiety and depression. He is medication complaint except refusing cream for his feet. Not attending group. He is not social with peers and staff. VSS. Denies pain. He is alert to self only. Continue to monitor pt. as plan of care.

## 2022-05-17 PROCEDURE — 250N000013 HC RX MED GY IP 250 OP 250 PS 637: Performed by: PSYCHIATRY & NEUROLOGY

## 2022-05-17 PROCEDURE — 124N000003 HC R&B MH SENIOR/ADOLESCENT

## 2022-05-17 RX ADMIN — MEMANTINE 10 MG: 10 TABLET ORAL at 08:41

## 2022-05-17 RX ADMIN — ASPIRIN 81 MG: 81 TABLET, COATED ORAL at 08:40

## 2022-05-17 RX ADMIN — DONEPEZIL HYDROCHLORIDE 10 MG: 10 TABLET ORAL at 20:16

## 2022-05-17 RX ADMIN — GABAPENTIN 100 MG: 100 CAPSULE ORAL at 17:20

## 2022-05-17 RX ADMIN — MEMANTINE 10 MG: 10 TABLET ORAL at 20:16

## 2022-05-17 RX ADMIN — BUSPIRONE HYDROCHLORIDE 30 MG: 15 TABLET ORAL at 20:16

## 2022-05-17 RX ADMIN — LEVOTHYROXINE SODIUM 88 MCG: 0.09 TABLET ORAL at 08:41

## 2022-05-17 RX ADMIN — BUSPIRONE HYDROCHLORIDE 30 MG: 15 TABLET ORAL at 08:43

## 2022-05-17 RX ADMIN — SALMETEROL XINAFOATE 1 PUFF: 50 POWDER, METERED ORAL; RESPIRATORY (INHALATION) at 08:41

## 2022-05-17 RX ADMIN — GABAPENTIN 100 MG: 100 CAPSULE ORAL at 08:40

## 2022-05-17 RX ADMIN — Medication 5 MG: at 20:16

## 2022-05-17 RX ADMIN — CLOZAPINE 200 MG: 100 TABLET ORAL at 20:16

## 2022-05-17 RX ADMIN — HALOPERIDOL 1 MG: 1 TABLET ORAL at 20:16

## 2022-05-17 RX ADMIN — SALMETEROL XINAFOATE 1 PUFF: 50 POWDER, METERED ORAL; RESPIRATORY (INHALATION) at 20:16

## 2022-05-17 RX ADMIN — MIRTAZAPINE 15 MG: 15 TABLET, FILM COATED ORAL at 20:16

## 2022-05-17 RX ADMIN — NYSTATIN: 100000 CREAM TOPICAL at 08:40

## 2022-05-17 RX ADMIN — MEGESTROL ACETATE 20 MG: 20 TABLET ORAL at 08:40

## 2022-05-17 RX ADMIN — GABAPENTIN 100 MG: 100 CAPSULE ORAL at 11:15

## 2022-05-17 ASSESSMENT — ACTIVITIES OF DAILY LIVING (ADL)
HYGIENE/GROOMING: PROMPTS
DRESS: WITH ASSISTANCE
DRESS: SCRUBS (BEHAVIORAL HEALTH)
HYGIENE/GROOMING: WITH ASSISTANCE
ORAL_HYGIENE: PROMPTS
ORAL_HYGIENE: PROMPTS

## 2022-05-17 NOTE — PLAN OF CARE
Goal Outcome Evaluation:    Plan of Care Reviewed With: patient        Pt continues to isolate to room except for meals. Ate ~50% of dinner. Poverty of thought and speech. One-word responses. Oriented to self only. Affect flat. Poor eye contact. Refused shower. Gait steady. Med-compliant. Delusional comments to provider earlier today. Appears responding to internal stimuli at times. Continue to encourage hydration. Continue to encourage pt to allow assistance with hygiene. Continue with current treatment plan and recommendations. Continue to monitor and reassess symptoms. Monitor response to medications. Monitor progress towards treatment goals. Encourage groups and participation

## 2022-05-17 NOTE — PLAN OF CARE
"  Problem: Fall Injury Risk  Goal: Absence of Fall and Fall-Related Injury  Outcome: Ongoing, Not Progressing   Goal Outcome Evaluation:     At about 12mn, an [facility] staff who was sitting with another patient in the room opposite to this patient's room, heard a sound like someone just fell and the unit staff were informed.Patient had an unwitnessed fall. Patient was found sitting on the floor in the bathroom. He was assisted by one staff as he got up from the floor and walked back to his bed. He just voided in the bathroom and while walking on his way back to his bed, he lost balance and fell on the floor. The bathroom door was also detached and lying on the floor. Patient fell on his butt and denied hitting his head. He also denied feeling dizzy and having any pain on any part of hs body. He was able to move both upper and lower extremities in active and passive motion with ease and no pain nor discomfort. He was also able to describe the details of the fall but got irritable when being asked. He refused to be interviewed further and asked this writer to leave the room at once. Vital signs were taken. BP-99/70, P-94, R-16, T-98.4 and O2 sat- 97% @ R.A. Patient was asked to drink a glass of water to help increase his BP, but he was refusing and just drank about 200 ml water. Night House Officer,Dr. Sanjay Perez came and examined the patient.No abnormalities noted.     Patient was reminded to call for assistance if he needs to go to the bathroom again. And he responded,\" Oh, shut up and get out of my room!\" Staff made sure that the bathroom and room floor was clean and free from any clutter or pool of water. Patient was provided with a bell and was reminded to use it or the call light to request for staff assistance. Patient has his fall band in place. The light in the bathroom was turned on so he can have enough lighting in the room during the night.The bathroom door was temporarily detached and was placed leaning on " the wall in the room. He was also educated further on the importance of observing the safety precautions set in place.     Slept a total of 9.5 hours.

## 2022-05-17 NOTE — PLAN OF CARE
Problem: Mood Impairment (Psychotic Signs/Symptoms)  Goal: Improved Mood Symptoms (Psychotic Signs/Symptoms)  Outcome: Ongoing, Progressing     Problem: Decreased Participation and Engagement (Psychotic Signs/Symptoms)  Goal: Increased Participation and Engagement (Psychotic Signs/Symptoms)  Outcome: Ongoing, Progressing       Plan of Care Reviewed With: patient    Pt calm, cooperative when approached, denies SI/SIB, denies anxiety, depression. Flat affect, continue to isolate self, withdrawn in his room. Refused cares, did not participate in activities, came out to eat diner, ate 75% of his food, went back to his room after eating.  Pt denies any pain, slightly unsteady when ambulating, medication compliant except his Mycostatin cream.

## 2022-05-17 NOTE — PLAN OF CARE
"Goal Outcome Evaluation:    Plan of Care Reviewed With: patientPlan of Care Reviewed With: patient    Overall Patient Progress: improving    Vitals: B/P- 118/64, P-105, T-97.1, 02-98%, R-16    Symptomatolgy:  Schizophrenia, ETOH abuse, Stimulant d/o, COPD, TBI    MH Summary: patient was calm and cooperative with assessment question, he denies SI/SIB/HI and contracts for safety, he denies anxiety, depression and I noted flat blunted affect with slow responses. He has isolated self to room even during breakfast and lunch, where he has consumed 50%-75% respectively of each meal. Patient denies internal stimuli but watching patient I noted he indeed exhibits internal stimuli via giggling and smiling to self. Gait is slow and unsteady but all extremities move well. Denies pain and allowed me to check his buttocks where he landed after an unwitnessed fall last evening,(no bruising noted) patient and I filled out his menu for tomorrow's  meal even though he said \"don't matter, I'm leaving tomorrow\" patient was med compliant throughout shift I continued to encourage patient to shower and shave, he smiled but allowed me to wash his face. Then requested to go out and smoke cigarette, when I informed him this was a smoke free campus, he replied \"humph\"             "

## 2022-05-17 NOTE — PROGRESS NOTES
"Cross Cover    Called regarding an unwittnessed fall; patient was found on the floor near the bathroom.  He states that he tripped and fell but caught himself with his hands.  Denies new pain or other concerns.  No LOC.  Did not strike his head.    /77 (BP Location: Right arm, Patient Position: Sitting)   Pulse 118   Temp 97.2  F (36.2  C) (Temporal)   Resp 16   Ht 1.778 m (5' 10\")   Wt 77.6 kg (171 lb)   SpO2 98%   BMI 24.54 kg/m      On exam he is withdrawn but has no evidence of trauma and moves all extremities well.  Strength is diminished but symmetric and effort-related.  Head is grossly atraumatic.    Fall without injury, will continue to follow as needed.    Sanjay Perez MD     "

## 2022-05-17 NOTE — PROGRESS NOTES
Patient was seen and evaluated today with the use of telehealth.  Patient continues to be delusional and hallucinating.  Patient insisted today that he has several mansions in the state of [place], [place], [place] and [place].  He also said that he has a mansion in [place] and he insisted on this writer calling his sister to verify this information.  Other than the grandiose delusions patient has been calm and redirectable.  He continues to denied having any thoughts of harming himself or harming others and is shayan for safety.    Ginger Dubon MD

## 2022-05-17 NOTE — PROGRESS NOTES
PSYCHIATRY  PROGRESS NOTE     DATE OF SERVICE   05/17/2022       CHIEF COMPLAINT   Patient was admitted due to inability to safely care for himself and psychosis.       SUBJECTIVE   Nursing reports:   Patient remains at his baseline and no new behaviors have been reported.    Patient has been discussed with the  during team meeting.   continues to follow up with the petition for guardianship.     OBJECTIVE   Patient was seen and evaluated at bedside by himself, this was a face-to-face evaluation.  Patient has continued to ask when he will be leaving the hospital but when educated he does not believe that he needs to go to a nursing home.  Patient continues to insist that he will be discharging to his own mansion.  He also has continued to hallucinate but these seem to be at his baseline.  He continues to denied any thoughts of harming himself or harming others and has been shayan for safety.     MEDICATIONS   Medications:  Scheduled Meds:    aspirin  81 mg Oral Daily     [Held by provider] atorvastatin  80 mg Oral At Bedtime     busPIRone HCl  30 mg Oral BID     cloZAPine  200 mg Oral At Bedtime     donepezil  10 mg Oral At Bedtime     gabapentin  100 mg Oral TID w/meals     haloperidol  1 mg Oral At Bedtime     levothyroxine  88 mcg Oral Daily     megestrol  20 mg Oral Daily     melatonin  5 mg Oral At Bedtime     memantine  10 mg Oral BID     mirtazapine  15 mg Oral At Bedtime     nystatin   Topical BID     salmeterol  1 puff Inhalation BID     Continuous Infusions:  PRN Meds:.[Held by provider] acetaminophen, albuterol, alum & mag hydroxide-simethicone, benzocaine-menthol, hydrOXYzine, nicotine, polyethylene glycol, polyethylene glycol-propylene glycol PF, risperiDONE, senna-docusate, [Held by provider] traZODone    Medication adherence issues: MS Med Adherence Y/N: Yes, Hospitalization  Medication side effects: MEDICATION SIDE EFFECTS: no side effects reported  Benefit: Yes / No:  "Yes       ROS   A comprehensive review of systems was negative.       MENTAL STATUS EXAM   Vitals: /84 (Patient Position: Sitting)   Pulse 105   Temp 97.1  F (36.2  C) (Temporal)   Resp 16   Ht 1.778 m (5' 10\")   Wt 77.6 kg (171 lb)   SpO2 98%   BMI 24.54 kg/m      Same as last visit  Appearance:  No apparent distress  Mood: \"I am okay.\"  Affect: Calm and pleasant  Suicidal Ideation: denies   Homicidal Ideation: denies   Thought process: concrete  Thought content: Remains confused and delusional.  He is responding to internal stimuli.  Fund of Knowledge: Below average  Attention/Concentration: Easily distracted  Language ability: Significantly impaired  Memory: Global memory impairment  Insight:  Poor.  Judgement: Poor  Orientation: Only to person  Psychomotor Behavior: slowed    Muscle Strength and Tone: MuscleStrength: Normal and Atrophy  Gait and Station: Not evaluated       LABS   personally reviewed.     No results found for: PHENYTOIN, PHENOBARB, VALPROATE, CBMZ       DIAGNOSIS   Principal Problem:    Major neurocognitive disorder, due to multiple etiologies, with behavioral disturbance, severe (H)    Active Problem List:  Patient Active Problem List   Diagnosis     TBI with aggressive behavior     HTN (hypertension)     COPD (chronic obstructive pulmonary disease) (H)     Dementia with behavioral disturbance (H)     Chronic schizophrenia (H)     Smoker     Agitation     Behavior disturbance     Psychosis (H)     Major neurocognitive disorder, due to multiple etiologies, with behavioral disturbance, severe (H)     Paranoid schizophrenia, chronic condition with acute exacerbation (H)          PLAN   1. Ongoing education given regarding diagnostic and treatment options with risks, benefits and alternatives and adequate verbalization of understanding.  2.  Medications       BuSpar 30 mg 2 times daily       Clozaril 200 mg at bedtime       Aricept 10 mg at bedtime       Gabapentin 100 mg 3 times a " day       Haldol 1 mg at bedtime       Namenda 10 mg 2 times a day       Remeron 15 mg at bedtime       melatonin 5 mg at bedtime  3.  Medical team following the patient   4.   coordinating a safe discharge plan with the patient.    Risk Assessment: [facility] RISK ASSESSMENT: Patient able to contract for safety    Coordination of Care:   Treatment Plan reviewed and physician signed, Care discussed with Care/Treatment Team Members, Chart reviewed and Patient seen      Re-Certification I certify that the inpatient psychiatric facility services furnished since the previous certification were, and continue to be, medically necessary for, either, treatment which could reasonably be expected to improve the patient s condition or diagnostic study and that the hospital records indicate that the services furnished were, either, intensive treatment services, admission and related services necessary for diagnostic study, or equivalent services.     I certify that the patient continues to need, on a daily basis, active treatment furnished directly by or requiring the supervision of inpatient psychiatric facility personnel.   I estimate 14 days of hospitalization is necessary for proper treatment of the patient. My plans for post-hospital care for this patient are  TBD     Ginger Dubon MD    -     5/16/2022 -     5:16 PM    Total time 15 minutes with > 50%spent on coordination of cares and psycho-education.    This note was created with help of Dragon dictation system. Grammatical / typing errors are not intentional.    Ginger Dubon MD

## 2022-05-18 PROCEDURE — 250N000013 HC RX MED GY IP 250 OP 250 PS 637: Performed by: PSYCHIATRY & NEUROLOGY

## 2022-05-18 PROCEDURE — 99231 SBSQ HOSP IP/OBS SF/LOW 25: CPT | Performed by: PSYCHIATRY & NEUROLOGY

## 2022-05-18 PROCEDURE — 124N000003 HC R&B MH SENIOR/ADOLESCENT

## 2022-05-18 RX ADMIN — LEVOTHYROXINE SODIUM 88 MCG: 0.09 TABLET ORAL at 09:52

## 2022-05-18 RX ADMIN — MIRTAZAPINE 15 MG: 15 TABLET, FILM COATED ORAL at 20:13

## 2022-05-18 RX ADMIN — DONEPEZIL HYDROCHLORIDE 10 MG: 10 TABLET ORAL at 20:13

## 2022-05-18 RX ADMIN — SALMETEROL XINAFOATE 1 PUFF: 50 POWDER, METERED ORAL; RESPIRATORY (INHALATION) at 20:13

## 2022-05-18 RX ADMIN — CLOZAPINE 200 MG: 100 TABLET ORAL at 20:13

## 2022-05-18 RX ADMIN — SALMETEROL XINAFOATE 1 PUFF: 50 POWDER, METERED ORAL; RESPIRATORY (INHALATION) at 09:53

## 2022-05-18 RX ADMIN — BUSPIRONE HYDROCHLORIDE 30 MG: 15 TABLET ORAL at 09:52

## 2022-05-18 RX ADMIN — MEGESTROL ACETATE 20 MG: 20 TABLET ORAL at 09:52

## 2022-05-18 RX ADMIN — BUSPIRONE HYDROCHLORIDE 30 MG: 15 TABLET ORAL at 20:13

## 2022-05-18 RX ADMIN — GABAPENTIN 100 MG: 100 CAPSULE ORAL at 09:52

## 2022-05-18 RX ADMIN — MEMANTINE 10 MG: 10 TABLET ORAL at 20:13

## 2022-05-18 RX ADMIN — GABAPENTIN 100 MG: 100 CAPSULE ORAL at 17:49

## 2022-05-18 RX ADMIN — MEMANTINE 10 MG: 10 TABLET ORAL at 09:53

## 2022-05-18 RX ADMIN — Medication 5 MG: at 20:13

## 2022-05-18 RX ADMIN — ASPIRIN 81 MG: 81 TABLET, COATED ORAL at 09:51

## 2022-05-18 RX ADMIN — HALOPERIDOL 1 MG: 1 TABLET ORAL at 20:13

## 2022-05-18 RX ADMIN — GABAPENTIN 100 MG: 100 CAPSULE ORAL at 13:31

## 2022-05-18 ASSESSMENT — ACTIVITIES OF DAILY LIVING (ADL)
ORAL_HYGIENE: PROMPTS;WITH ASSISTANCE
LAUNDRY: UNABLE TO COMPLETE
ORAL_HYGIENE: INDEPENDENT
DRESS: SCRUBS (BEHAVIORAL HEALTH)
HYGIENE/GROOMING: WITH ASSISTANCE
HYGIENE/GROOMING: INDEPENDENT
DRESS: WITH ASSISTANCE

## 2022-05-18 NOTE — PLAN OF CARE
"Goal Outcome Evaluation:    Plan of Care Reviewed With: patient        Pt continues to isolate to room except for meals. Ate ~50% of dinner. Poverty of thought and speech. One-word responses. Oriented to self only. Affect flat. Poor eye contact. Refused shower. Gait steady. Med-compliant. Stated tonight he wants a \"cigarette\" but declined offer of nicotine gum when offered. Appears responding to internal stimuli at times. Continue to encourage hydration. Continue to encourage pt to allow assistance with hygiene. Continue with current treatment plan and recommendations. Continue to monitor and reassess symptoms. Monitor response to medications. Monitor progress towards treatment goals. Encourage groups and participation         "

## 2022-05-18 NOTE — PLAN OF CARE
Care coordination:  - Patient's care was discussed in team meeting.  - Writer spoke with patient's  at [facility]. He stated patient's commitment/pittman ends on July 12th.   - Writer called [facility] to attempt to retrieve a copy of the filed guardianship. Writer was not successful. The  said they do not automatically fax the patient a copy of the filing.  - Writer requested a copy from Karla, and Karla said she will inquire.   - Below are the case numbers.  - Writer spoke with Paco from pre petition intake at [facility] to inquire the process for extending commitments. He stated the assigned  (Kurtis from [facility]) is the one to file the paperwork. Writer emailed Kurtis the information.     Case No. [id number]- Guardianship has been filed - filed on 5/13/22  [facility] Probate mental health   [phone] Probate/Mental Health  Fax: [phone]    Case No. [id number]  Commitment/Pittman - expires in July  [facility]   [phone] Probate/Mental Health

## 2022-05-18 NOTE — PROGRESS NOTES
PSYCHIATRY  PROGRESS NOTE     DATE OF SERVICE   05/18/2022       CHIEF COMPLAINT   Patient was admitted due to inability to safely care for himself and psychosis.       SUBJECTIVE   Nursing reports: Pt continues to isolate to room except for meals. Ate ~50% of dinner. Poverty of thought and speech. One-word responses. Oriented to self only. Affect flat. Poor eye contact. Refused shower. Gait steady. Med-compliant. Delusional comments to provider earlier today. Appears responding to internal stimuli at times. Continue to encourage hydration. Continue to encourage pt to allow assistance with hygiene. Continue with current treatment plan and recommendations. Continue to monitor and reassess symptoms. Monitor response to medications. Monitor progress towards treatment goals. Encourage groups and participation     Patient has been discussed with the  during team meeting.   informed me to expires soon and she is in communication with the county about what needs to be done     OBJECTIVE   Patient was seen and evaluated at bedside by himself, this was a face-to-face evaluation.  Patient reported that he is feeling tired and at this time denies all other psychiatric symptoms.  Today he did not wanted to engage in a conversation and asked this writer to leave the room because he wanted to continue taking a nap.       MEDICATIONS   Medications:  Scheduled Meds:    aspirin  81 mg Oral Daily     [Held by provider] atorvastatin  80 mg Oral At Bedtime     busPIRone HCl  30 mg Oral BID     cloZAPine  200 mg Oral At Bedtime     donepezil  10 mg Oral At Bedtime     gabapentin  100 mg Oral TID w/meals     haloperidol  1 mg Oral At Bedtime     levothyroxine  88 mcg Oral Daily     megestrol  20 mg Oral Daily     melatonin  5 mg Oral At Bedtime     memantine  10 mg Oral BID     mirtazapine  15 mg Oral At Bedtime     nystatin   Topical BID     salmeterol  1 puff Inhalation BID     Continuous Infusions:  PRN  "Meds:.[Held by provider] acetaminophen, albuterol, alum & mag hydroxide-simethicone, benzocaine-menthol, hydrOXYzine, nicotine, polyethylene glycol, polyethylene glycol-propylene glycol PF, risperiDONE, senna-docusate, [Held by provider] traZODone    Medication adherence issues: MS Med Adherence Y/N: Yes, Hospitalization  Medication side effects: MEDICATION SIDE EFFECTS: no side effects reported  Benefit: Yes / No: Yes       ROS   A comprehensive review of systems was negative.       MENTAL STATUS EXAM   Vitals: /77 (BP Location: Right arm, Patient Position: Sitting)   Pulse 94   Temp 97.8  F (36.6  C) (Oral)   Resp 16   Ht 1.778 m (5' 10\")   Wt 77.6 kg (171 lb)   SpO2 96%   BMI 24.54 kg/m      Appearance:  No apparent distress  Mood: \"I am tired.\"  Affect: Calm  Suicidal Ideation: denies   Homicidal Ideation: denies   Thought process: concrete  Thought content: Remains confused and delusional.    Fund of Knowledge: Below average  Attention/Concentration: Poor  Language ability: Significantly impaired  Memory: Global memory impairment  Insight:  Poor.  Judgement: Poor  Orientation: Only to person  Psychomotor Behavior: slowed    Muscle Strength and Tone: MuscleStrength: Normal and Atrophy  Gait and Station: Not evaluated       LABS   personally reviewed.     No results found for: PHENYTOIN, PHENOBARB, VALPROATE, CBMZ       DIAGNOSIS   Principal Problem:    Major neurocognitive disorder, due to multiple etiologies, with behavioral disturbance, severe (H)    Active Problem List:  Patient Active Problem List   Diagnosis     TBI with aggressive behavior     HTN (hypertension)     COPD (chronic obstructive pulmonary disease) (H)     Dementia with behavioral disturbance (H)     Chronic schizophrenia (H)     Smoker     Agitation     Behavior disturbance     Psychosis (H)     Major neurocognitive disorder, due to multiple etiologies, with behavioral disturbance, severe (H)     Paranoid schizophrenia, chronic " condition with acute exacerbation (H)          PLAN   1. Ongoing education given regarding diagnostic and treatment options with risks, benefits and alternatives and adequate verbalization of understanding.  2.  Medications       BuSpar 30 mg 2 times daily       Clozaril 200 mg at bedtime       Aricept 10 mg at bedtime       Gabapentin 100 mg 3 times a day       Haldol 1 mg at bedtime       Namenda 10 mg 2 times a day       Remeron 15 mg at bedtime       melatonin 5 mg at bedtime  3.  Medical team following the patient   4.   coordinating a safe discharge plan with the patient.    Risk Assessment: [facility] RISK ASSESSMENT: Patient able to contract for safety    Coordination of Care:   Treatment Plan reviewed and physician signed, Care discussed with Care/Treatment Team Members, Chart reviewed and Patient seen      Re-Certification I certify that the inpatient psychiatric facility services furnished since the previous certification were, and continue to be, medically necessary for, either, treatment which could reasonably be expected to improve the patient s condition or diagnostic study and that the hospital records indicate that the services furnished were, either, intensive treatment services, admission and related services necessary for diagnostic study, or equivalent services.     I certify that the patient continues to need, on a daily basis, active treatment furnished directly by or requiring the supervision of inpatient psychiatric facility personnel.   I estimate 14 days of hospitalization is necessary for proper treatment of the patient. My plans for post-hospital care for this patient are  TBD     Ginger Dubon MD    -     05/18/2022  -     1:55 PM    Total time 15 minutes with > 50%spent on coordination of cares and psycho-education.    This note was created with help of Dragon dictation system. Grammatical / typing errors are not intentional.    Ginger Dubon MD

## 2022-05-18 NOTE — PLAN OF CARE
Problem: Depression  Goal: Improved Mood  Outcome: Ongoing, Progressing   Goal Outcome Evaluation:     Patient slept throughout the  night, a total of  9.5 hours. He did not get up to void the whole shift. No falls noted tonight.

## 2022-05-18 NOTE — PLAN OF CARE
Goal Outcome Evaluation:    Plan of Care Reviewed With: patient      Patient stayed in room through the shift, sleeping.  Flat affect, denies SI/SIB and other mental health symptoms.  Refused to attend group meetings when offered.  Ate about 50 -75 % breakfast and lunch.  Took scheduled medications with no problem.  No aggressive behavior noted, will continue to monitor closely.

## 2022-05-19 LAB — SARS-COV-2 RNA RESP QL NAA+PROBE: NEGATIVE

## 2022-05-19 PROCEDURE — U0003 INFECTIOUS AGENT DETECTION BY NUCLEIC ACID (DNA OR RNA); SEVERE ACUTE RESPIRATORY SYNDROME CORONAVIRUS 2 (SARS-COV-2) (CORONAVIRUS DISEASE [COVID-19]), AMPLIFIED PROBE TECHNIQUE, MAKING USE OF HIGH THROUGHPUT TECHNOLOGIES AS DESCRIBED BY CMS-2020-01-R: HCPCS | Performed by: PSYCHIATRY & NEUROLOGY

## 2022-05-19 PROCEDURE — 250N000013 HC RX MED GY IP 250 OP 250 PS 637: Performed by: PSYCHIATRY & NEUROLOGY

## 2022-05-19 PROCEDURE — 124N000003 HC R&B MH SENIOR/ADOLESCENT

## 2022-05-19 PROCEDURE — 99231 SBSQ HOSP IP/OBS SF/LOW 25: CPT | Mod: GT | Performed by: PSYCHIATRY & NEUROLOGY

## 2022-05-19 RX ADMIN — CLOZAPINE 200 MG: 100 TABLET ORAL at 20:46

## 2022-05-19 RX ADMIN — MIRTAZAPINE 15 MG: 15 TABLET, FILM COATED ORAL at 20:47

## 2022-05-19 RX ADMIN — GABAPENTIN 100 MG: 100 CAPSULE ORAL at 17:25

## 2022-05-19 RX ADMIN — Medication 5 MG: at 20:47

## 2022-05-19 RX ADMIN — LEVOTHYROXINE SODIUM 88 MCG: 0.09 TABLET ORAL at 09:43

## 2022-05-19 RX ADMIN — GABAPENTIN 100 MG: 100 CAPSULE ORAL at 09:43

## 2022-05-19 RX ADMIN — BUSPIRONE HYDROCHLORIDE 30 MG: 15 TABLET ORAL at 20:47

## 2022-05-19 RX ADMIN — BUSPIRONE HYDROCHLORIDE 30 MG: 15 TABLET ORAL at 09:42

## 2022-05-19 RX ADMIN — MEGESTROL ACETATE 20 MG: 20 TABLET ORAL at 09:44

## 2022-05-19 RX ADMIN — MEMANTINE 10 MG: 10 TABLET ORAL at 20:47

## 2022-05-19 RX ADMIN — DONEPEZIL HYDROCHLORIDE 10 MG: 10 TABLET ORAL at 20:47

## 2022-05-19 RX ADMIN — SALMETEROL XINAFOATE 1 PUFF: 50 POWDER, METERED ORAL; RESPIRATORY (INHALATION) at 09:44

## 2022-05-19 RX ADMIN — GABAPENTIN 100 MG: 100 CAPSULE ORAL at 12:34

## 2022-05-19 RX ADMIN — HALOPERIDOL 1 MG: 1 TABLET ORAL at 20:47

## 2022-05-19 RX ADMIN — MEMANTINE 10 MG: 10 TABLET ORAL at 09:43

## 2022-05-19 RX ADMIN — SALMETEROL XINAFOATE 1 PUFF: 50 POWDER, METERED ORAL; RESPIRATORY (INHALATION) at 20:47

## 2022-05-19 ASSESSMENT — ACTIVITIES OF DAILY LIVING (ADL)
ORAL_HYGIENE: WITH ASSISTANCE
HYGIENE/GROOMING: WITH ASSISTANCE
ORAL_HYGIENE: INDEPENDENT
DRESS: WITH ASSISTANCE
DRESS: WITH ASSISTANCE
HYGIENE/GROOMING: WITH ASSISTANCE

## 2022-05-19 NOTE — PROGRESS NOTES
"PSYCHIATRY  PROGRESS NOTE     DATE OF SERVICE   05/19/2022  The patient is a 56 year old male who is being evaluated via a video billable telemedicine visit. The patient/guardian has consented to being seen via telemedicine. The provider was in front of a computer in a home office. The patient was on the inpatient unit at [facility]     Start time: 10:51 AM  Stop time: 10:57 AM    The patient/guardian has been notified of the following:     This telemedicine visit is conducted live between you and your clinician. We have found that certain health care needs can be provided without the need for a physical exam. This service lets us provide the care you need with a telemedicine conversation.           CHIEF COMPLAINT   Patient was admitted due to inability to safely care for himself and psychosis.       SUBJECTIVE   Nursing reports: Pt continues to isolate to room except for meals. Ate ~50% of dinner. Poverty of thought and speech. One-word responses. Oriented to self only. Affect flat. Poor eye contact. Refused shower. Gait steady. Med-compliant. Stated tonight he wants a \"cigarette\" but declined offer of nicotine gum when offered. Appears responding to internal stimuli at times. Continue to encourage hydration. Continue to encourage pt to allow assistance with hygiene. Continue with current treatment plan and recommendations. Continue to monitor and reassess symptoms. Monitor response to medications. Monitor progress towards treatment goals. Encourage groups and participation     Patient has been discussed with the  during team meeting.   has received the commitment documents to be refiled.  This will be provided to Dr. Boswell to complete.     OBJECTIVE   Patient was seen and evaluated at bedside by himself, this was done with the use of telehealth.  Patient remains disorganized and actively hallucinating.  He is a poor historian and it is unclear how much he is able to understand.  " "He continues to denied all psychiatric symptoms and is shayan for safety.       MEDICATIONS   Medications:  Scheduled Meds:    aspirin  81 mg Oral Daily     [Held by provider] atorvastatin  80 mg Oral At Bedtime     busPIRone HCl  30 mg Oral BID     cloZAPine  200 mg Oral At Bedtime     donepezil  10 mg Oral At Bedtime     gabapentin  100 mg Oral TID w/meals     haloperidol  1 mg Oral At Bedtime     levothyroxine  88 mcg Oral Daily     megestrol  20 mg Oral Daily     melatonin  5 mg Oral At Bedtime     memantine  10 mg Oral BID     mirtazapine  15 mg Oral At Bedtime     nystatin   Topical BID     salmeterol  1 puff Inhalation BID     Continuous Infusions:  PRN Meds:.[Held by provider] acetaminophen, albuterol, alum & mag hydroxide-simethicone, benzocaine-menthol, hydrOXYzine, nicotine, polyethylene glycol, polyethylene glycol-propylene glycol PF, risperiDONE, senna-docusate, [Held by provider] traZODone    Medication adherence issues: MS Med Adherence Y/N: Yes, Hospitalization  Medication side effects: MEDICATION SIDE EFFECTS: no side effects reported  Benefit: Yes / No: Yes       ROS   A comprehensive review of systems was negative.       MENTAL STATUS EXAM   Vitals: /79 (BP Location: Left arm, Patient Position: Sitting, Cuff Size: Adult Regular)   Pulse 103   Temp 98.3  F (36.8  C) (Oral)   Resp 16   Ht 1.778 m (5' 10\")   Wt 77.6 kg (171 lb)   SpO2 99%   BMI 24.54 kg/m      Appearance:  No apparent distress  Mood: \"I want to leave the hospital.\"  Affect: Calm  Suicidal Ideation: denies   Homicidal Ideation: denies   Thought process: concrete  Thought content: Remains confused and delusional.    Fund of Knowledge: Below average  Attention/Concentration: Poor  Language ability: Significantly impaired  Memory: Global memory impairment  Insight:  Poor.  Judgement: Poor  Orientation: Only to person  Psychomotor Behavior: slowed    Muscle Strength and Tone: MuscleStrength: Normal and Atrophy  Gait and " Station: Not evaluated       LABS   personally reviewed.     No results found for: PHENYTOIN, PHENOBARB, VALPROATE, CBMZ       DIAGNOSIS   Principal Problem:    Major neurocognitive disorder, due to multiple etiologies, with behavioral disturbance, severe (H)    Active Problem List:  Patient Active Problem List   Diagnosis     TBI with aggressive behavior     HTN (hypertension)     COPD (chronic obstructive pulmonary disease) (H)     Dementia with behavioral disturbance (H)     Chronic schizophrenia (H)     Smoker     Agitation     Behavior disturbance     Psychosis (H)     Major neurocognitive disorder, due to multiple etiologies, with behavioral disturbance, severe (H)     Paranoid schizophrenia, chronic condition with acute exacerbation (H)          PLAN   1. Ongoing education given regarding diagnostic and treatment options with risks, benefits and alternatives and adequate verbalization of understanding.  2.  Medications       BuSpar 30 mg 2 times daily       Clozaril 200 mg at bedtime       Aricept 10 mg at bedtime       Gabapentin 100 mg 3 times a day       Haldol 1 mg at bedtime       Namenda 10 mg 2 times a day       Remeron 15 mg at bedtime       melatonin 5 mg at bedtime  3.  Medical team following the patient   4.   coordinating a safe discharge plan with the patient.    Risk Assessment: [facility] RISK ASSESSMENT: Patient able to contract for safety    Coordination of Care:   Treatment Plan reviewed and physician signed, Care discussed with Care/Treatment Team Members, Chart reviewed and Patient seen      Re-Certification I certify that the inpatient psychiatric facility services furnished since the previous certification were, and continue to be, medically necessary for, either, treatment which could reasonably be expected to improve the patient s condition or diagnostic study and that the hospital records indicate that the services furnished were, either, intensive treatment services,  admission and related services necessary for diagnostic study, or equivalent services.     I certify that the patient continues to need, on a daily basis, active treatment furnished directly by or requiring the supervision of inpatient psychiatric facility personnel.   I estimate 14 days of hospitalization is necessary for proper treatment of the patient. My plans for post-hospital care for this patient are  TBD     Ginger Dubon MD    -     05/19/2022  -     12:05 PM    Total time 15 minutes with > 50%spent on coordination of cares and psycho-education.    This note was created with help of Dragon dictation system. Grammatical / typing errors are not intentional.    Ginger Dubon MD

## 2022-05-19 NOTE — PLAN OF CARE
Problem: Sleep Disturbance (Psychotic Signs/Symptoms)  Goal: Improved Sleep (Psychotic Signs/Symptoms)  Outcome: Ongoing, Progressing   Goal Outcome Evaluation:     Patient slept a total of 9.25 hours. He was noted to be awake once but just kept tossing in bed and went back to sleep.  No concerns noted the whole shift.

## 2022-05-19 NOTE — PLAN OF CARE
Care coordination:  - Patient's care was discussed in team meeting.  - Writer received examiner's statement in support of commitment that needs to be completed by the provider.   - Writer communicated with patient's covering provider.  - Writer emailed workers at [facility] and  at [facility] to inform them of the filing of the guardianship.   - [facility] can not proceed until guardianship is finalized. They have put the waiver assessment on hold.   - Kurtis from [facility] is in charge of extending the commitment/pittman.    Referrals:    - Patient was accepted to [facility]. Contact: [email or web address]     Case No. [id number]- Guardianship has been filed - filed on 5/13/22  [facility] Probate mental health   [phone] Probate/Mental Health  Fax: [phone]     Case No. [id number]  Commitment/Pittman - expires in July  [facility]   [phone] Probate/Mental Health    Barriers to discharge:  - Legal guardianship is a barrier

## 2022-05-19 NOTE — PLAN OF CARE
"Goal Outcome Evaluation:    Plan of Care Reviewed With: patient      Pt isolative to room except for meals and community meeting. Participated in [employer] game. Ate ~50% of dinner. Poverty of thought and speech. One-word responses. Oriented to self only. Affect flat. Poor eye contact. Gait steady. Med-compliant. Appears responding to internal stimuli at times. Impaired concentration. When writer approached pt at dinner with scheduled gabapentin, writer asked pt several times to take the capsule but pt continued on eating as if he did not hear writer. Mumbled to self briefly, but refused to elaborate on what he had said. Did not appear to be addressing writer. After some time waiting for patient to take the medication, he looked up at writer and asked \"what do you want?\" Repeated to pt once again that writer wanted pt to take the medication, which he then did. Continue to encourage hydration. Continue to encourage pt to allow assistance with hygiene. Continue with current treatment plan and recommendations. Continue to monitor and reassess symptoms. Monitor response to medications. Monitor progress towards treatment goals. Encourage groups and participation         "

## 2022-05-19 NOTE — PLAN OF CARE
"  Problem: Behavior Regulation Impairment (Psychotic Signs/Symptoms)  Goal: Improved Behavioral Control (Psychotic Signs/Symptoms)  Outcome: Met  Flowsheets (Taken 5/19/2022 1411)  Mutually Determined Action Steps (Improved Behavioral Control): verbalizes gratifying activity   Goal Outcome Evaluation:    Plan of Care Reviewed With: patient        Nursing Assessment    Psychosis (H) [F29]    Admit Date: 1/26/2022    Length of Stay: 113    Patient evaluation continues. Assessed mood,anxiety,thoughts and behavior. Patient is  progressing towards goals. Patient is encouraged to participate in groups and assisted to develop healthy coping skills.  Patient admits to auditory hallucinations. /79 (BP Location: Left arm, Patient Position: Sitting, Cuff Size: Adult Regular)   Pulse 103   Temp 98.3  F (36.8  C) (Oral)   Resp 16   Ht 1.778 m (5' 10\")   Wt 77.6 kg (171 lb)   SpO2 99%   BMI 24.54 kg/m      Mood: good    Patient reports depression no and reports anxiety no    Affect:flat blunted    Sleep: 9 hours last night    Appetite: good    SI: denies    HI: denies    SIB: denies      Medication Compliance yes    Group participation:none    ADL's: assisted and needs prompting. Refuses showers frequently.    Fall risk interventions: proper foot wear, orthostatic B/P, increased visualization    Rinku Score Interventions:none    Discharge planning in process    Refer to daily team meeting notes for individualized plan of care. Nursing will continue to assess.    *Scale is 1-10 and 10 is the worst.                "

## 2022-05-20 PROCEDURE — 99232 SBSQ HOSP IP/OBS MODERATE 35: CPT | Performed by: PSYCHIATRY & NEUROLOGY

## 2022-05-20 PROCEDURE — 250N000013 HC RX MED GY IP 250 OP 250 PS 637: Performed by: PSYCHIATRY & NEUROLOGY

## 2022-05-20 PROCEDURE — 124N000003 HC R&B MH SENIOR/ADOLESCENT

## 2022-05-20 RX ADMIN — MEMANTINE 10 MG: 10 TABLET ORAL at 21:28

## 2022-05-20 RX ADMIN — BUSPIRONE HYDROCHLORIDE 30 MG: 15 TABLET ORAL at 08:13

## 2022-05-20 RX ADMIN — SALMETEROL XINAFOATE 1 PUFF: 50 POWDER, METERED ORAL; RESPIRATORY (INHALATION) at 21:29

## 2022-05-20 RX ADMIN — BUSPIRONE HYDROCHLORIDE 30 MG: 15 TABLET ORAL at 21:28

## 2022-05-20 RX ADMIN — MIRTAZAPINE 15 MG: 15 TABLET, FILM COATED ORAL at 21:28

## 2022-05-20 RX ADMIN — DONEPEZIL HYDROCHLORIDE 10 MG: 10 TABLET ORAL at 21:28

## 2022-05-20 RX ADMIN — NYSTATIN: 100000 CREAM TOPICAL at 08:14

## 2022-05-20 RX ADMIN — GABAPENTIN 100 MG: 100 CAPSULE ORAL at 17:32

## 2022-05-20 RX ADMIN — LEVOTHYROXINE SODIUM 88 MCG: 0.09 TABLET ORAL at 08:13

## 2022-05-20 RX ADMIN — GABAPENTIN 100 MG: 100 CAPSULE ORAL at 12:33

## 2022-05-20 RX ADMIN — MEGESTROL ACETATE 20 MG: 20 TABLET ORAL at 08:13

## 2022-05-20 RX ADMIN — HALOPERIDOL 1 MG: 1 TABLET ORAL at 21:29

## 2022-05-20 RX ADMIN — Medication 5 MG: at 21:28

## 2022-05-20 RX ADMIN — ASPIRIN 81 MG: 81 TABLET, COATED ORAL at 08:13

## 2022-05-20 RX ADMIN — SALMETEROL XINAFOATE 1 PUFF: 50 POWDER, METERED ORAL; RESPIRATORY (INHALATION) at 08:14

## 2022-05-20 RX ADMIN — GABAPENTIN 100 MG: 100 CAPSULE ORAL at 08:13

## 2022-05-20 RX ADMIN — CLOZAPINE 200 MG: 100 TABLET ORAL at 21:28

## 2022-05-20 RX ADMIN — MEMANTINE 10 MG: 10 TABLET ORAL at 08:13

## 2022-05-20 ASSESSMENT — ACTIVITIES OF DAILY LIVING (ADL)
HYGIENE/GROOMING: WITH ASSISTANCE;PROMPTS
ORAL_HYGIENE: WITH ASSISTANCE;PROMPTS
DRESS: WITH ASSISTANCE
DRESS: WITH ASSISTANCE;PROMPTS
HYGIENE/GROOMING: WITH ASSISTANCE;PROMPTS
ORAL_HYGIENE: WITH ASSISTANCE;PROMPTS
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
## 2022-05-20 NOTE — PLAN OF CARE
"  Problem: Decreased Participation and Engagement (Psychotic Signs/Symptoms)  Goal: Increased Participation and Engagement (Psychotic Signs/Symptoms)  Outcome: Ongoing, Progressing   Goal Outcome Evaluation:    Plan of Care Reviewed With: patient               Nursing Assessment    Psychosis (H) [F29]    Admit Date: 1/26/2022    Length of Stay: 114    Patient evaluation continues. Assessed mood,anxiety,thoughts and behavior. Patient is progressing towards goals. Pt is out in the milieu more this shift. Continues to refuse shower but allows basin set up and prompts to wash. Allows hospital scrub change. Patient is encouraged to participate in groups and assisted to develop healthy coping skills.  Patient admits  auditory hallucinations. BP 96/73 (BP Location: Right arm, Patient Position: Sitting, Cuff Size: Adult Regular)   Pulse 90   Temp 98.6  F (37  C) (Temporal)   Resp 16   Ht 1.778 m (5' 10\")   Wt 77.6 kg (171 lb)   SpO2 96%   BMI 24.54 kg/m      Mood: ok    Patient reports depression will not respond to questions asked and reports anxiety  as some    Affect:flat blunted    Sleep: 9-10 hours    Appetite: good    SI: denies    HI: denies    SIB: denies      Medication Compliance yes    Group participation: none    ADL's: refuses, needs encouragement.    Fall risk interventions: proper foot wear, orthostatic B/P    Rinku Score Interventions: none    Discharge planning in process    Refer to daily team meeting notes for individualized plan of care. Nursing will continue to assess.    *Scale is 1-10 and 10 is the worst.         "

## 2022-05-20 NOTE — PLAN OF CARE
Problem: Sleep Disturbance (Psychotic Signs/Symptoms)  Goal: Improved Sleep (Psychotic Signs/Symptoms)  Outcome: Ongoing, Progressing   Goal Outcome Evaluation:    Patient slept for a total of 9.25 hours   without any interruptions. He did not get up to pee the whole night. No behavioral issues raised the whole shift.

## 2022-05-21 PROCEDURE — 250N000013 HC RX MED GY IP 250 OP 250 PS 637: Performed by: PSYCHIATRY & NEUROLOGY

## 2022-05-21 PROCEDURE — 124N000003 HC R&B MH SENIOR/ADOLESCENT

## 2022-05-21 RX ADMIN — MEMANTINE 10 MG: 10 TABLET ORAL at 19:58

## 2022-05-21 RX ADMIN — SALMETEROL XINAFOATE 1 PUFF: 50 POWDER, METERED ORAL; RESPIRATORY (INHALATION) at 09:00

## 2022-05-21 RX ADMIN — BUSPIRONE HYDROCHLORIDE 30 MG: 15 TABLET ORAL at 19:58

## 2022-05-21 RX ADMIN — BUSPIRONE HYDROCHLORIDE 30 MG: 15 TABLET ORAL at 09:00

## 2022-05-21 RX ADMIN — MEMANTINE 10 MG: 10 TABLET ORAL at 09:00

## 2022-05-21 RX ADMIN — GABAPENTIN 100 MG: 100 CAPSULE ORAL at 09:00

## 2022-05-21 RX ADMIN — HALOPERIDOL 1 MG: 1 TABLET ORAL at 19:58

## 2022-05-21 RX ADMIN — DONEPEZIL HYDROCHLORIDE 10 MG: 10 TABLET ORAL at 19:58

## 2022-05-21 RX ADMIN — MIRTAZAPINE 15 MG: 15 TABLET, FILM COATED ORAL at 19:58

## 2022-05-21 RX ADMIN — Medication 5 MG: at 19:58

## 2022-05-21 RX ADMIN — ASPIRIN 81 MG: 81 TABLET, COATED ORAL at 09:00

## 2022-05-21 RX ADMIN — LEVOTHYROXINE SODIUM 88 MCG: 0.09 TABLET ORAL at 09:00

## 2022-05-21 RX ADMIN — MEGESTROL ACETATE 20 MG: 20 TABLET ORAL at 09:00

## 2022-05-21 RX ADMIN — CLOZAPINE 200 MG: 100 TABLET ORAL at 19:58

## 2022-05-21 RX ADMIN — GABAPENTIN 100 MG: 100 CAPSULE ORAL at 17:36

## 2022-05-21 RX ADMIN — SALMETEROL XINAFOATE 1 PUFF: 50 POWDER, METERED ORAL; RESPIRATORY (INHALATION) at 20:36

## 2022-05-21 RX ADMIN — GABAPENTIN 100 MG: 100 CAPSULE ORAL at 12:34

## 2022-05-21 ASSESSMENT — ACTIVITIES OF DAILY LIVING (ADL)
LAUNDRY: UNABLE TO COMPLETE
DRESS: SCRUBS (BEHAVIORAL HEALTH)
HYGIENE/GROOMING: PROMPTS;WITH ASSISTANCE
ORAL_HYGIENE: PROMPTS

## 2022-05-21 NOTE — PLAN OF CARE
Problem: Behavioral Health Plan of Care  Goal: Plan of Care Review  Outcome: Ongoing, Progressing  Flowsheets (Taken 5/21/2022 0915)  Plan of Care Reviewed With: patient  Patient Agreement with Plan of Care: agrees    Pt presents with blunted, flat affect and calm mood. Denies SI/SIB. Reports auditory hallucinations, but unable to describe to writer what he is hearing. Denies depression and anxiety. Pt remains oriented to self only. Disoriented to time, place, and situation. Reports that it is June 20th 2099, states he is at [facility], and does not know why he is here. Pt was reoriented, but pt noted to just blankly stare at writer. He remained largely isolated to his room this shift. Refused to come out for breakfast or lunch, at only 25% for both meals. Denies pain. Medication compliant, no PRNs given. BP soft, will continue to monitor, all other VS WNL. Pt continues to wait on placement/guardianship. Appears to be at his baseline. No other concerns or complaints noted.

## 2022-05-21 NOTE — PLAN OF CARE
"  Problem: Decreased Participation and Engagement (Psychotic Signs/Symptoms)  Goal: Increased Participation and Engagement (Psychotic Signs/Symptoms)  Outcome: Ongoing, Progressing   Goal Outcome Evaluation:  Nursing Assessment    Psychosis (H) [F29]    Admit Date: 1/26/2022    Length of Stay: 114    Patient evaluation continues. Assessed mood,anxiety,thoughts and behavior. Patient is  progressing towards goals. Patient is encouraged to participate in groups and assisted to develop healthy coping skills.  Patient admits to auditory or hallucinations. /73 (BP Location: Right arm)   Pulse 110   Temp 98  F (36.7  C) (Oral)   Resp 16   Ht 1.778 m (5' 10\")   Wt 77.6 kg (171 lb)   SpO2 99%   BMI 24.54 kg/m      Mood: ok    Patient reports depression none and reports anxiety none    Affect:flat blunted    Sleep: 9-10 hours each night    Appetite: good 80%    SI: denies    HI: denies    SIB: denies      Medication Compliance : yes    Group participation:none    ADL's: assist, refuses much of the time    Fall risk interventions: proper foot wear, orthostatic B/P    Rinku Score Interventions:none    Discharge planning in process    Refer to daily team meeting notes for individualized plan of care. Nursing will continue to assess.    *Scale is 1-10 and 10 is the worst.         Plan of Care Reviewed With: patient                 "

## 2022-05-22 PROBLEM — S09.90XS MOOD DISORDER DUE TO OLD HEAD INJURY: Status: ACTIVE | Noted: 2022-05-20

## 2022-05-22 PROBLEM — F06.30 MOOD DISORDER DUE TO OLD HEAD INJURY: Status: ACTIVE | Noted: 2022-05-20

## 2022-05-22 PROBLEM — F29 SCHIZOPHRENIA SPECTRUM DISORDER WITH PSYCHOTIC DISORDER TYPE NOT YET DETERMINED (H): Status: ACTIVE | Noted: 2022-05-20

## 2022-05-22 LAB
BASOPHILS # BLD AUTO: 0.2 10E3/UL (ref 0–0.2)
BASOPHILS NFR BLD AUTO: 3 %
EOSINOPHIL # BLD AUTO: 1.2 10E3/UL (ref 0–0.7)
EOSINOPHIL NFR BLD AUTO: 22 %
LYMPHOCYTES # BLD AUTO: 1.5 10E3/UL (ref 0.8–5.3)
LYMPHOCYTES NFR BLD AUTO: 27 %
MONOCYTES # BLD AUTO: 0.9 10E3/UL (ref 0–1.3)
MONOCYTES NFR BLD AUTO: 17 %
NEUTROPHILS # BLD AUTO: 1.7 10E3/UL (ref 1.6–8.3)
NEUTROPHILS NFR BLD AUTO: 31 %
WBC # BLD AUTO: 5.5 10E3/UL (ref 4–11)

## 2022-05-22 PROCEDURE — 85004 AUTOMATED DIFF WBC COUNT: CPT | Performed by: PSYCHIATRY & NEUROLOGY

## 2022-05-22 PROCEDURE — 124N000003 HC R&B MH SENIOR/ADOLESCENT

## 2022-05-22 PROCEDURE — 250N000013 HC RX MED GY IP 250 OP 250 PS 637: Performed by: PSYCHIATRY & NEUROLOGY

## 2022-05-22 PROCEDURE — 36415 COLL VENOUS BLD VENIPUNCTURE: CPT | Performed by: PSYCHIATRY & NEUROLOGY

## 2022-05-22 RX ADMIN — MEMANTINE 10 MG: 10 TABLET ORAL at 19:43

## 2022-05-22 RX ADMIN — Medication 5 MG: at 19:43

## 2022-05-22 RX ADMIN — GABAPENTIN 100 MG: 100 CAPSULE ORAL at 12:11

## 2022-05-22 RX ADMIN — BUSPIRONE HYDROCHLORIDE 30 MG: 15 TABLET ORAL at 19:43

## 2022-05-22 RX ADMIN — MEMANTINE 10 MG: 10 TABLET ORAL at 08:35

## 2022-05-22 RX ADMIN — HALOPERIDOL 1 MG: 1 TABLET ORAL at 19:43

## 2022-05-22 RX ADMIN — DONEPEZIL HYDROCHLORIDE 10 MG: 10 TABLET ORAL at 19:43

## 2022-05-22 RX ADMIN — LEVOTHYROXINE SODIUM 88 MCG: 0.09 TABLET ORAL at 08:35

## 2022-05-22 RX ADMIN — ASPIRIN 81 MG: 81 TABLET, COATED ORAL at 08:35

## 2022-05-22 RX ADMIN — GABAPENTIN 100 MG: 100 CAPSULE ORAL at 17:22

## 2022-05-22 RX ADMIN — CLOZAPINE 200 MG: 100 TABLET ORAL at 19:43

## 2022-05-22 RX ADMIN — MIRTAZAPINE 15 MG: 15 TABLET, FILM COATED ORAL at 19:43

## 2022-05-22 RX ADMIN — SALMETEROL XINAFOATE 1 PUFF: 50 POWDER, METERED ORAL; RESPIRATORY (INHALATION) at 19:43

## 2022-05-22 RX ADMIN — GABAPENTIN 100 MG: 100 CAPSULE ORAL at 08:35

## 2022-05-22 RX ADMIN — BUSPIRONE HYDROCHLORIDE 30 MG: 15 TABLET ORAL at 08:35

## 2022-05-22 RX ADMIN — SALMETEROL XINAFOATE 1 PUFF: 50 POWDER, METERED ORAL; RESPIRATORY (INHALATION) at 08:35

## 2022-05-22 RX ADMIN — MEGESTROL ACETATE 20 MG: 20 TABLET ORAL at 08:35

## 2022-05-22 ASSESSMENT — ACTIVITIES OF DAILY LIVING (ADL)
ORAL_HYGIENE: PROMPTS
DRESS: SCRUBS (BEHAVIORAL HEALTH)
HYGIENE/GROOMING: PROMPTS;WITH ASSISTANCE
HYGIENE/GROOMING: WITH ASSISTANCE
LAUNDRY: UNABLE TO COMPLETE
LAUNDRY: UNABLE TO COMPLETE
DRESS: SCRUBS (BEHAVIORAL HEALTH)
ORAL_HYGIENE: PROMPTS

## 2022-05-22 NOTE — PLAN OF CARE
Problem: Sleep Disturbance (Psychotic Signs/Symptoms)  Goal: Improved Sleep (Psychotic Signs/Symptoms)  Outcome: Ongoing, Progressing   Goal Outcome Evaluation:    Patient slept throughout the shift, a total of 11 hours. Did not get up to void the whole night. No behavioral concerns raised the whole shift.

## 2022-05-22 NOTE — PLAN OF CARE
Problem: Behavioral Health Plan of Care  Goal: Plan of Care Review  5/22/2022 1746 by Marilyn Monroe RN  Outcome: Ongoing, Progressing  Flowsheets (Taken 5/22/2022 1744)  Plan of Care Reviewed With: patient  Patient Agreement with Plan of Care: agrees    Pt presents with blunted, flat affect and calm mood. Denies SI/SIB. Continues to have auditory hallucinations, pt unable to tell writer what he is hearing. Denies depression and anxiety when asked. He is noted to be alert to self only, disoriented to time, place, and situation. Pt remained isolated to room majority of the shift, only coming to eat dinner with a lot of encouragement. Gait noted to be unsteady at first. Pt ate about 50% of his dinner tray. Pt continues to need prompting to push fluids. He continues to stare blankly at writer when you attempt to have a conversation. This evening, when the  came in to draw blood, pt was noted to be grandiose. 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 Pt also reported that he was the son of Willie Carranza. This conversation was one of the longest he had all weekend that writer heard. He denies pain. He told writer that he felt tired this evening. Reported having a BM after dinner but it was not visualized. Continues to refuse assistance with ADLs - he appears unkempt and unclean. VSS. Medication compliant, no PRNs given. Pt is scheduled to have fasting labs in the AM. No other concerns or complaints noted.

## 2022-05-22 NOTE — PLAN OF CARE
Problem: Behavioral Health Plan of Care  Goal: Plan of Care Review  5/21/2022 2007 by Marilyn Monroe RN  Outcome: Ongoing, Progressing  Flowsheets (Taken 5/21/2022 1729)  Plan of Care Reviewed With: patient  Patient Agreement with Plan of Care: agrees      Pt presents with blunted, flat affect and calm mood. Denies SI/SIB. Continues to have auditory hallucinations, pt noted to be talking to someone at one point when writer entered his room. Pt continues to be alert to self only. Remained mostly isolated to his room. Did come out to play BINGO and also eat dinner, which he consumed 75% of. Denies pain. VSS. Medication compliant. Fluid intake fair, needs encouragement to drink. Refused shower. Gait remains slow, steady, and balanced. No other concerns or complaints noted this shift.

## 2022-05-22 NOTE — PLAN OF CARE
Problem: Behavioral Health Plan of Care  Goal: Plan of Care Review  5/22/2022 0854 by Marilyn Monroe RN  Outcome: Ongoing, Progressing  Flowsheets (Taken 5/22/2022 0848)  Plan of Care Reviewed With: patient  Patient Agreement with Plan of Care: agrees    Pt presents with blunted, flat affect and calm mood. Denies SI/SIB. Pt still noted to be experiencing auditory hallucinations. Denies depression and anxiety. Pt remains withdrawn and isolative to room. Alert to self only, disoriented to place, time, and situation. When conversing with pt, noted to just have blank stare. Pt does not offer anything in conversation. Refused his breakfast, did get him to drink about 3/4 of an Ensure. At lunch, he ate 1/4 of his sandwich and drank 2 orange juice, would not come to the lounge for meals. Pt appears unkempt/unclean and refusing assistance with shower. Denies pain. VSS. Medication compliant. No other concerns or complaints noted.

## 2022-05-23 LAB
ALBUMIN SERPL-MCNC: 3.3 G/DL (ref 3.4–5)
ALP SERPL-CCNC: 72 U/L (ref 40–150)
ALT SERPL W P-5'-P-CCNC: 99 U/L (ref 0–70)
AST SERPL W P-5'-P-CCNC: 156 U/L (ref 0–45)
BILIRUB DIRECT SERPL-MCNC: <0.1 MG/DL (ref 0–0.2)
BILIRUB SERPL-MCNC: 0.4 MG/DL (ref 0.2–1.3)
CHOLEST SERPL-MCNC: 132 MG/DL
HDLC SERPL-MCNC: 28 MG/DL
LDLC SERPL CALC-MCNC: 77 MG/DL
NONHDLC SERPL-MCNC: 104 MG/DL
PROT SERPL-MCNC: 7.3 G/DL (ref 6.8–8.8)
TRIGL SERPL-MCNC: 135 MG/DL

## 2022-05-23 PROCEDURE — 250N000013 HC RX MED GY IP 250 OP 250 PS 637: Performed by: PSYCHIATRY & NEUROLOGY

## 2022-05-23 PROCEDURE — 99232 SBSQ HOSP IP/OBS MODERATE 35: CPT | Performed by: PSYCHIATRY & NEUROLOGY

## 2022-05-23 PROCEDURE — 80061 LIPID PANEL: CPT | Performed by: PSYCHIATRY & NEUROLOGY

## 2022-05-23 PROCEDURE — 124N000003 HC R&B MH SENIOR/ADOLESCENT

## 2022-05-23 PROCEDURE — 36415 COLL VENOUS BLD VENIPUNCTURE: CPT | Performed by: PSYCHIATRY & NEUROLOGY

## 2022-05-23 PROCEDURE — 80076 HEPATIC FUNCTION PANEL: CPT | Performed by: PSYCHIATRY & NEUROLOGY

## 2022-05-23 RX ORDER — HALOPERIDOL 5 MG/1
5 TABLET ORAL AT BEDTIME
Status: DISCONTINUED | OUTPATIENT
Start: 2022-05-23 | End: 2022-05-30

## 2022-05-23 RX ADMIN — SALMETEROL XINAFOATE 1 PUFF: 50 POWDER, METERED ORAL; RESPIRATORY (INHALATION) at 08:54

## 2022-05-23 RX ADMIN — HALOPERIDOL 5 MG: 5 TABLET ORAL at 20:01

## 2022-05-23 RX ADMIN — MEGESTROL ACETATE 20 MG: 20 TABLET ORAL at 08:49

## 2022-05-23 RX ADMIN — GABAPENTIN 100 MG: 100 CAPSULE ORAL at 13:24

## 2022-05-23 RX ADMIN — SALMETEROL XINAFOATE 1 PUFF: 50 POWDER, METERED ORAL; RESPIRATORY (INHALATION) at 20:01

## 2022-05-23 RX ADMIN — ASPIRIN 81 MG: 81 TABLET, COATED ORAL at 08:50

## 2022-05-23 RX ADMIN — Medication 5 MG: at 20:01

## 2022-05-23 RX ADMIN — DONEPEZIL HYDROCHLORIDE 10 MG: 10 TABLET ORAL at 20:01

## 2022-05-23 RX ADMIN — MIRTAZAPINE 15 MG: 15 TABLET, FILM COATED ORAL at 20:01

## 2022-05-23 RX ADMIN — GABAPENTIN 100 MG: 100 CAPSULE ORAL at 17:30

## 2022-05-23 RX ADMIN — GABAPENTIN 100 MG: 100 CAPSULE ORAL at 08:49

## 2022-05-23 RX ADMIN — LEVOTHYROXINE SODIUM 88 MCG: 0.09 TABLET ORAL at 08:49

## 2022-05-23 RX ADMIN — MEMANTINE 10 MG: 10 TABLET ORAL at 20:01

## 2022-05-23 RX ADMIN — BUSPIRONE HYDROCHLORIDE 30 MG: 15 TABLET ORAL at 08:50

## 2022-05-23 RX ADMIN — MEMANTINE 10 MG: 10 TABLET ORAL at 08:51

## 2022-05-23 RX ADMIN — CLOZAPINE 200 MG: 100 TABLET ORAL at 20:01

## 2022-05-23 RX ADMIN — BUSPIRONE HYDROCHLORIDE 30 MG: 15 TABLET ORAL at 20:01

## 2022-05-23 ASSESSMENT — ACTIVITIES OF DAILY LIVING (ADL)
HYGIENE/GROOMING: PROMPTS
DRESS: SCRUBS (BEHAVIORAL HEALTH);INDEPENDENT;WITH SUPERVISION
LAUNDRY: UNABLE TO COMPLETE
ORAL_HYGIENE: PROMPTS

## 2022-05-23 NOTE — PLAN OF CARE
Goal Outcome Evaluation:    Plan of Care Reviewed With: patient     Patient's mood appeared calm with blunted flat affect. He came out to the milieu for meals, ate 75% of breakfast and lunch. He appeared withdrawn, confused and disorganized. He denies symptoms of depression, anxiety and SI/SIB. Patient is medication complaint, denies any pain.
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
## 2022-05-23 NOTE — PLAN OF CARE
05/23/22 1702   Individualization/Patient Specific Goals   Patient Personal Strengths resilient   Patient Vulnerabilities lacks insight into illness   Anxieties, Fears or Concerns Patient is not able to articulate fears.   Individualized Care Needs Symptom reduction and stabilization. Team is waiting on guardianship to be finalized, and patient can discharge to assisted living.   Patient-Specific Goals (Include Timeframe) 10-15 days continue with the plan.   Interprofessional Rounds   Summary Patient was discussed in team meeting. The team is waiting for the guardianship to be finalized, and then patient can discharge to a safe place.   Participants nursing;psychiatrist;[facility]   Team Discussion   Participants Dr. Boswell, Winnie CALIX, Joi Rodriguez OT, RN   Progress Patient is making progress.   Anticipated length of stay 30 days. Hospital can not discharge patient without a safe discharge plan. Guardianship has been filed on 5/13, waiting on the court date. Stabilization needed.   Continued Stay Criteria/Rationale Symptom reduction and stabilization, and safe discharge plans.   Medical/Physical Please see H and P.   Precautions Fall   Plan Patient will receive psychiatric evaluations, medication management, nursing assessments, group therapy, CTC case management, and milieu therapy. Patient is waiting for guardianship to be finalized (no court date, yet), and patient will discharge to an assisted living.   Safety Plan s-15   Anticipated Discharge Disposition assisted living

## 2022-05-23 NOTE — PLAN OF CARE
Care coordination:  Writer emailed a scanned copy of the documents regarding extending patient's commitment and pittman, to Kurtis at [facility]. Kurtis is the responsible party to file the paperwork to extend the current commitment and pittman.   Two page paperwork is in patient's chart under the legal tab.

## 2022-05-23 NOTE — PLAN OF CARE
Problem: Sleep Disturbance (Psychotic Signs/Symptoms)  Goal: Improved Sleep (Psychotic Signs/Symptoms)  Outcome: Ongoing, Progressing   Goal Outcome Evaluation:    Patient was maintained on NPO post midnight as he will have his Hepatic Panel and Lipid Profile done today.     Slept a total of 10.75 hours. No behavioral concerns raised the whole shift.

## 2022-05-24 PROBLEM — F25.9 SCHIZOPHRENIA, SCHIZOAFFECTIVE, CHRONIC WITH ACUTE EXACERBATION (H): Status: ACTIVE | Noted: 2022-05-23

## 2022-05-24 PROCEDURE — 250N000013 HC RX MED GY IP 250 OP 250 PS 637: Performed by: PSYCHIATRY & NEUROLOGY

## 2022-05-24 PROCEDURE — 124N000003 HC R&B MH SENIOR/ADOLESCENT

## 2022-05-24 PROCEDURE — 99232 SBSQ HOSP IP/OBS MODERATE 35: CPT | Performed by: PSYCHIATRY & NEUROLOGY

## 2022-05-24 RX ADMIN — CLOZAPINE 200 MG: 100 TABLET ORAL at 20:44

## 2022-05-24 RX ADMIN — BUSPIRONE HYDROCHLORIDE 30 MG: 15 TABLET ORAL at 20:44

## 2022-05-24 RX ADMIN — GABAPENTIN 100 MG: 100 CAPSULE ORAL at 12:19

## 2022-05-24 RX ADMIN — Medication 5 MG: at 20:44

## 2022-05-24 RX ADMIN — GABAPENTIN 100 MG: 100 CAPSULE ORAL at 08:34

## 2022-05-24 RX ADMIN — DONEPEZIL HYDROCHLORIDE 10 MG: 10 TABLET ORAL at 20:44

## 2022-05-24 RX ADMIN — HALOPERIDOL 5 MG: 5 TABLET ORAL at 20:44

## 2022-05-24 RX ADMIN — MEMANTINE 10 MG: 10 TABLET ORAL at 08:35

## 2022-05-24 RX ADMIN — SALMETEROL XINAFOATE 1 PUFF: 50 POWDER, METERED ORAL; RESPIRATORY (INHALATION) at 20:45

## 2022-05-24 RX ADMIN — MEMANTINE 10 MG: 10 TABLET ORAL at 20:44

## 2022-05-24 RX ADMIN — ASPIRIN 81 MG: 81 TABLET, COATED ORAL at 08:35

## 2022-05-24 RX ADMIN — MIRTAZAPINE 15 MG: 15 TABLET, FILM COATED ORAL at 20:44

## 2022-05-24 RX ADMIN — SALMETEROL XINAFOATE 1 PUFF: 50 POWDER, METERED ORAL; RESPIRATORY (INHALATION) at 08:38

## 2022-05-24 RX ADMIN — LEVOTHYROXINE SODIUM 88 MCG: 0.09 TABLET ORAL at 08:35

## 2022-05-24 RX ADMIN — BUSPIRONE HYDROCHLORIDE 30 MG: 15 TABLET ORAL at 08:35

## 2022-05-24 RX ADMIN — MEGESTROL ACETATE 20 MG: 20 TABLET ORAL at 08:35

## 2022-05-24 RX ADMIN — GABAPENTIN 100 MG: 100 CAPSULE ORAL at 19:03

## 2022-05-24 RX ADMIN — NYSTATIN: 100000 CREAM TOPICAL at 08:38

## 2022-05-24 ASSESSMENT — ACTIVITIES OF DAILY LIVING (ADL)
DRESS: INDEPENDENT;PROMPTS
HYGIENE/GROOMING: PROMPTS
DRESS: INDEPENDENT
LAUNDRY: UNABLE TO COMPLETE
ORAL_HYGIENE: INDEPENDENT;PROMPTS
ORAL_HYGIENE: PROMPTS
HYGIENE/GROOMING: INDEPENDENT;PROMPTS
LAUNDRY: UNABLE TO COMPLETE

## 2022-05-24 NOTE — PLAN OF CARE
Problem: Behavior Regulation Impairment (Psychotic Signs/Symptoms)  Goal: Improved Behavioral Control (Psychotic Signs/Symptoms)  Outcome: Ongoing, Not Progressing   Goal Outcome Evaluation:    Plan of Care Reviewed With: patient     Patient remains isolative and withdrawn. He refused to attend groups this evening. He was just laying down in bed, awake. His mood is calm but with flat and blunt affect. He ate almost 75% of his dinner. He had dinner in his room. Refused to come out in his room. He is sleeping good at night. Denies any mental health symptoms. No SI/SIB nor hallucinations. Very minimal in his responses. He looks unkempt. Refused to have a shower this evening. Med compliant. His Haldol was increased to 5 mgs from 1 mg at HS.

## 2022-05-24 NOTE — PLAN OF CARE
Goal Outcome Evaluation:    Plan of Care Reviewed With: patient    Patient has been calm with blunted flat affect. He isolated to his room most of the shift only coming out for meals with encouragement. He appears disheveled and was encouraged to shower but declined. He appears confused and disorganized, denies all mental health symptoms, medication complaint.

## 2022-05-24 NOTE — PLAN OF CARE
Problem: Sleep Disturbance (Psychotic Signs/Symptoms)  Goal: Improved Sleep (Psychotic Signs/Symptoms)  Outcome: Ongoing, Progressing   Goal Outcome Evaluation:        Slept well tonight for 9.5 hours  No complaints/ concerns raised this shift

## 2022-05-25 PROCEDURE — 250N000013 HC RX MED GY IP 250 OP 250 PS 637: Performed by: PSYCHIATRY & NEUROLOGY

## 2022-05-25 PROCEDURE — 124N000003 HC R&B MH SENIOR/ADOLESCENT

## 2022-05-25 PROCEDURE — 99231 SBSQ HOSP IP/OBS SF/LOW 25: CPT | Performed by: PSYCHIATRY & NEUROLOGY

## 2022-05-25 RX ADMIN — LEVOTHYROXINE SODIUM 88 MCG: 0.09 TABLET ORAL at 08:41

## 2022-05-25 RX ADMIN — HALOPERIDOL 5 MG: 5 TABLET ORAL at 19:18

## 2022-05-25 RX ADMIN — MEMANTINE 10 MG: 10 TABLET ORAL at 19:19

## 2022-05-25 RX ADMIN — CLOZAPINE 200 MG: 100 TABLET ORAL at 19:18

## 2022-05-25 RX ADMIN — MIRTAZAPINE 15 MG: 15 TABLET, FILM COATED ORAL at 19:18

## 2022-05-25 RX ADMIN — GABAPENTIN 100 MG: 100 CAPSULE ORAL at 13:20

## 2022-05-25 RX ADMIN — BUSPIRONE HYDROCHLORIDE 30 MG: 15 TABLET ORAL at 19:18

## 2022-05-25 RX ADMIN — MEMANTINE 10 MG: 10 TABLET ORAL at 08:41

## 2022-05-25 RX ADMIN — GABAPENTIN 100 MG: 100 CAPSULE ORAL at 08:41

## 2022-05-25 RX ADMIN — MEGESTROL ACETATE 20 MG: 20 TABLET ORAL at 08:40

## 2022-05-25 RX ADMIN — GABAPENTIN 100 MG: 100 CAPSULE ORAL at 17:30

## 2022-05-25 RX ADMIN — SALMETEROL XINAFOATE 1 PUFF: 50 POWDER, METERED ORAL; RESPIRATORY (INHALATION) at 19:18

## 2022-05-25 RX ADMIN — DONEPEZIL HYDROCHLORIDE 10 MG: 10 TABLET ORAL at 19:19

## 2022-05-25 RX ADMIN — ASPIRIN 81 MG: 81 TABLET, COATED ORAL at 08:41

## 2022-05-25 RX ADMIN — Medication 5 MG: at 19:18

## 2022-05-25 RX ADMIN — SALMETEROL XINAFOATE 1 PUFF: 50 POWDER, METERED ORAL; RESPIRATORY (INHALATION) at 08:40

## 2022-05-25 RX ADMIN — NYSTATIN: 100000 CREAM TOPICAL at 08:46

## 2022-05-25 RX ADMIN — BUSPIRONE HYDROCHLORIDE 30 MG: 15 TABLET ORAL at 08:41

## 2022-05-25 ASSESSMENT — ACTIVITIES OF DAILY LIVING (ADL)
ORAL_HYGIENE: PROMPTS
DRESS: SCRUBS (BEHAVIORAL HEALTH);INDEPENDENT
HYGIENE/GROOMING: PROMPTS
DRESS: PROMPTS;SCRUBS (BEHAVIORAL HEALTH)
HYGIENE/GROOMING: PROMPTS
ORAL_HYGIENE: PROMPTS
LAUNDRY: UNABLE TO COMPLETE

## 2022-05-25 NOTE — PLAN OF CARE
Goal Outcome Evaluation:    Plan of Care Reviewed With: patient    Patient has been isolative and withdrawn. He continues to display blunted flat affect, confusion and being disorganized. He is only oriented to self, denies all mental health symptoms and is cooperative with medications. He comes out for meals with encouragement, ate 75% of his meals. He appears disheveled but declined offer/encouragement to shower.
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
## 2022-05-25 NOTE — PLAN OF CARE
Assessment/Intervention/Current Symtoms and Care Coordination:    Current Symptoms include the following: confusion and patient is irritable    Upcoming appointments:    Guardianship Hearing 6/9/22 at 1:30 pm    Chart Review    Met with team to discuss patient care.    Patient had court hearing via zoom today.    W accompanied , Yaniv, to deliver court paperwork for guardianship. W copied paperwork, gave one copy to [facility] for the chart and provided original without the staple. Asked if he had any questions, he said no.    Called prepetition sceening at [phone] and [facility] for return call inquiring about process in initiating a Commitment from patient's current recommitment. Not sure if CTC goes through PSP or through mental health court. Left contact information for CTC Winnie.    Discharge Plan or Goal:  Pending stabilization & development of a safe discharge plan.  Considerations include: SNF    Barriers to Discharge:  Patient requires further psychiatric stabilization due to current symptomology, Medication management with possible adjustments and lack of competence for decision making without assigned health care agent.    Referral Status:  No new referrals made    Legal Status:  Patient is under MICD commitment in [facility] with zain

## 2022-05-25 NOTE — PROGRESS NOTES
SPIRITUAL HEALTH SERVICES  SPIRITUAL ASSESSMENT Progress Note  [facility] 3BW     REFERRAL SOURCE:  initiated, length of stay    Brief conversation with John in pt room. Pt asked if writer had any news about discharge, closed eyes and did not interact anymore with spiritual assessment questions upon clarification of role and that writer does not have news.     Plan: Spiritual Health remains available at patient's request for the duration of admission.    Ana Gupta MDiv  Associate   Pager [phone]  Office [phone]  [facility] remains available 24/7 for emergent requests/referrals, either by having the switchboard page the on-call  or by entering an ASAP/STAT consult in Epic (this will also page the on-call ). Routine Epic consults receive an initial response within 24 hours.

## 2022-05-25 NOTE — PLAN OF CARE
Problem: Cognitive Impairment (Psychotic Signs/Symptoms)  Goal: Optimal Cognitive Function (Psychotic Signs/Symptoms)  Outcome: Ongoing, Not Progressing     Problem: Decreased Participation and Engagement (Psychotic Signs/Symptoms)  Goal: Increased Participation and Engagement (Psychotic Signs/Symptoms)  Outcome: Ongoing, Not Progressing     Problem: Mood Impairment (Psychotic Signs/Symptoms)  Goal: Improved Mood Symptoms (Psychotic Signs/Symptoms)  Outcome: Ongoing, Not Progressing     Problem: Fall Injury Risk  Goal: Absence of Fall and Fall-Related Injury  Intervention: Promote Injury-Free Environment  Recent Flowsheet Documentation  Taken 5/25/2022 7570 by Una Gaytan RN  Safety Promotion/Fall Prevention:   safety round/check completed   nonskid shoes/slippers when out of bed   clutter free environment maintained   Goal Outcome Evaluation:    Plan of Care Reviewed With: patient     Pt continues to be isolative and withdrawn. Pt declines to come out of his room except for meals. Pt needs encouragement to take fluids. Pt responses are minimal and at times he does not respond to questions. Pt hygiene remains poor. Pt is medication compliant.

## 2022-05-25 NOTE — PLAN OF CARE
Problem: Sleep Disturbance (Psychotic Signs/Symptoms)  Goal: Improved Sleep (Psychotic Signs/Symptoms)  Outcome: Ongoing, Progressing   Goal Outcome Evaluation:                  Slept well for 7 hours  No concerns raised this shift.

## 2022-05-25 NOTE — PLAN OF CARE
Care coordination:  - Patient's care was discussed in team meeting.  - Writer communicated via phone with [facility]. Patient has a screening, tomorrow, at 10:30 a.m. for his petition for guardianship court date in June.   - Writer emailed [facility] contact to confirm it is a phone screening. Writer did not receive a zoom link for the meeting tomorrow.   - Writer served patient court documents, and faxed the return of service document to the fax number provided (spoke with Zulma from risk management).  - Writer and patient called patient's  and left a voice message. Patient is requesting to speak to his . Patient has paperwork showing the appointed  and phone number. A copy is in patient's chart, under legal.     Referral:  - None today    Barriers to discharge:  - Guardianship, funding, stabilization.

## 2022-05-26 PROCEDURE — 250N000013 HC RX MED GY IP 250 OP 250 PS 637: Performed by: PSYCHIATRY & NEUROLOGY

## 2022-05-26 PROCEDURE — 124N000003 HC R&B MH SENIOR/ADOLESCENT

## 2022-05-26 PROCEDURE — 99232 SBSQ HOSP IP/OBS MODERATE 35: CPT | Performed by: PSYCHIATRY & NEUROLOGY

## 2022-05-26 RX ORDER — BENZTROPINE MESYLATE 0.5 MG/1
0.5 TABLET ORAL 2 TIMES DAILY
Status: DISCONTINUED | OUTPATIENT
Start: 2022-05-26 | End: 2022-10-13 | Stop reason: HOSPADM

## 2022-05-26 RX ADMIN — NYSTATIN: 100000 CREAM TOPICAL at 20:52

## 2022-05-26 RX ADMIN — BUSPIRONE HYDROCHLORIDE 30 MG: 15 TABLET ORAL at 20:51

## 2022-05-26 RX ADMIN — BUSPIRONE HYDROCHLORIDE 30 MG: 15 TABLET ORAL at 08:25

## 2022-05-26 RX ADMIN — MIRTAZAPINE 15 MG: 15 TABLET, FILM COATED ORAL at 20:51

## 2022-05-26 RX ADMIN — SALMETEROL XINAFOATE 1 PUFF: 50 POWDER, METERED ORAL; RESPIRATORY (INHALATION) at 20:51

## 2022-05-26 RX ADMIN — GABAPENTIN 100 MG: 100 CAPSULE ORAL at 17:24

## 2022-05-26 RX ADMIN — LEVOTHYROXINE SODIUM 88 MCG: 0.09 TABLET ORAL at 08:26

## 2022-05-26 RX ADMIN — DONEPEZIL HYDROCHLORIDE 10 MG: 10 TABLET ORAL at 20:51

## 2022-05-26 RX ADMIN — MEMANTINE 10 MG: 10 TABLET ORAL at 08:26

## 2022-05-26 RX ADMIN — GABAPENTIN 100 MG: 100 CAPSULE ORAL at 08:26

## 2022-05-26 RX ADMIN — Medication 5 MG: at 20:51

## 2022-05-26 RX ADMIN — SALMETEROL XINAFOATE 1 PUFF: 50 POWDER, METERED ORAL; RESPIRATORY (INHALATION) at 08:27

## 2022-05-26 RX ADMIN — HALOPERIDOL 5 MG: 5 TABLET ORAL at 20:51

## 2022-05-26 RX ADMIN — MEGESTROL ACETATE 20 MG: 20 TABLET ORAL at 08:26

## 2022-05-26 RX ADMIN — GABAPENTIN 100 MG: 100 CAPSULE ORAL at 12:04

## 2022-05-26 RX ADMIN — MEMANTINE 10 MG: 10 TABLET ORAL at 20:51

## 2022-05-26 RX ADMIN — NYSTATIN: 100000 CREAM TOPICAL at 08:27

## 2022-05-26 RX ADMIN — BENZTROPINE MESYLATE 0.5 MG: 0.5 TABLET ORAL at 20:51

## 2022-05-26 RX ADMIN — CLOZAPINE 200 MG: 100 TABLET ORAL at 20:51

## 2022-05-26 RX ADMIN — ASPIRIN 81 MG: 81 TABLET, COATED ORAL at 08:26

## 2022-05-26 ASSESSMENT — ACTIVITIES OF DAILY LIVING (ADL)
LAUNDRY: UNABLE TO COMPLETE
ORAL_HYGIENE: PROMPTS
DRESS: SCRUBS (BEHAVIORAL HEALTH);INDEPENDENT
DRESS: SCRUBS (BEHAVIORAL HEALTH);INDEPENDENT
HYGIENE/GROOMING: PROMPTS
LAUNDRY: UNABLE TO COMPLETE
HYGIENE/GROOMING: PROMPTS
ORAL_HYGIENE: PROMPTS

## 2022-05-26 NOTE — PLAN OF CARE
Problem: Behavior Regulation Impairment (Psychotic Signs/Symptoms)  Goal: Improved Behavioral Control (Psychotic Signs/Symptoms)  Outcome: Ongoing, Progressing     Problem: Decreased Participation and Engagement (Psychotic Signs/Symptoms)  Goal: Increased Participation and Engagement (Psychotic Signs/Symptoms)  Intervention: Facilitate Participation and Engagement  Recent Flowsheet Documentation  Taken 5/26/2022 1007 by Jonelle Delatorre RN  Diversional Activity: music   Goal Outcome Evaluation:    Plan of Care Reviewed With: patient     Patient woke up past 0800. He took 75% of his breakfast. He is med compliant. He remains isolative and withdrawn. He declined to attend the group activities this morning. He was just dozing off in the lounge and later he transferred back to his room. Patient's mood is calm with a flat affect. Still minimal in his verbal responses. He denies SI/SIB nor hallucinations. Patient's appetite is good. He sleeps well at night. He will have an upcoming appointment re: guardianship hearing on June 9/22 at 1:30 p.m.
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
## 2022-05-26 NOTE — PLAN OF CARE
Care coordination:  - Patient was discussed in team meeting.  - Writer called and spoke with Tara from [facility]. They are continuing to hold a bed for [facility] - pending waiver and rates agreement. Writer communicated patient has a court date in June for the guardianship. The guardianship must be approved first, the [facility] will not conduct the [place] Assessment until the guardianship is approved. The [place] is needed for the patient to receive funding for the assisted living home.

## 2022-05-26 NOTE — PLAN OF CARE
Problem: Sleep Disturbance (Psychotic Signs/Symptoms)  Goal: Improved Sleep (Psychotic Signs/Symptoms)  Outcome: Ongoing, Progressing   Goal Outcome Evaluation:       Slept well tonight for 10.5 hours  Pt independent in repositioning self  in bed.  No requests made.
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
## 2022-05-26 NOTE — PLAN OF CARE
"Goal Outcome Evaluation:    Plan of Care Reviewed With: patient      Patient still in his room dozing off. He refused to have Covid Test. Despite explanation given, patient's refusal was strong. Re-approached x 2 to no avail.  He said,  \"I had it already. No, I'm not going to\". Despite explanation regarding the requirements of the facility where he will be going but did not verbally respond anymore. Patient came out from his room instead and took his supper. He took his 1800 medication without difficulty.  Patient ate 75% of his dinner, afterwhich he went back to his room.  Patient denied SI/SIB nor hallucinations. Denied any mental health symptoms. Patient's mood is calm with a flat affect. Took all his bedtime meds.           "

## 2022-05-26 NOTE — PLAN OF CARE
Problem: Behavior Regulation Impairment (Psychotic Signs/Symptoms)  Goal: Improved Behavioral Control (Psychotic Signs/Symptoms)  Outcome: Ongoing, Not Progressing   Goal Outcome Evaluation:    Plan of Care Reviewed With: patient     Patient remains isolative and withdrawn. He came out only during dinner time.  Patient is very minimal in his verbal responses during the check-in conversation. He denied SI/SIB nor hallucinations. Denied wishing himself to be dead. Encouraged to push fluids. Drank a glass of orange juice aside from what he got from his dinner. He took all his bedtime medications. No side effects reported. He still has limited insights of his hospitalizations; events and situations. Alert and oriented x 2. He slept early after taking his meds.

## 2022-05-27 LAB
BASOPHILS # BLD AUTO: 0.1 10E3/UL (ref 0–0.2)
BASOPHILS NFR BLD AUTO: 1 %
EOSINOPHIL # BLD AUTO: 1.4 10E3/UL (ref 0–0.7)
EOSINOPHIL NFR BLD AUTO: 19 %
ERYTHROCYTE [DISTWIDTH] IN BLOOD BY AUTOMATED COUNT: 13.7 % (ref 10–15)
HCT VFR BLD AUTO: 40.9 % (ref 40–53)
HGB BLD-MCNC: 14.6 G/DL (ref 13.3–17.7)
IMM GRANULOCYTES # BLD: 0 10E3/UL
IMM GRANULOCYTES NFR BLD: 0 %
LYMPHOCYTES # BLD AUTO: 1.6 10E3/UL (ref 0.8–5.3)
LYMPHOCYTES NFR BLD AUTO: 21 %
MCH RBC QN AUTO: 32 PG (ref 26.5–33)
MCHC RBC AUTO-ENTMCNC: 35.7 G/DL (ref 31.5–36.5)
MCV RBC AUTO: 90 FL (ref 78–100)
MONOCYTES # BLD AUTO: 0.5 10E3/UL (ref 0–1.3)
MONOCYTES NFR BLD AUTO: 7 %
NEUTROPHILS # BLD AUTO: 3.8 10E3/UL (ref 1.6–8.3)
NEUTROPHILS NFR BLD AUTO: 52 %
NRBC # BLD AUTO: 0 10E3/UL
NRBC BLD AUTO-RTO: 0 /100
PLATELET # BLD AUTO: 174 10E3/UL (ref 150–450)
RBC # BLD AUTO: 4.56 10E6/UL (ref 4.4–5.9)
WBC # BLD AUTO: 7.4 10E3/UL (ref 4–11)

## 2022-05-27 PROCEDURE — 36415 COLL VENOUS BLD VENIPUNCTURE: CPT | Performed by: PSYCHIATRY & NEUROLOGY

## 2022-05-27 PROCEDURE — 250N000013 HC RX MED GY IP 250 OP 250 PS 637: Performed by: PSYCHIATRY & NEUROLOGY

## 2022-05-27 PROCEDURE — 124N000003 HC R&B MH SENIOR/ADOLESCENT

## 2022-05-27 PROCEDURE — 99232 SBSQ HOSP IP/OBS MODERATE 35: CPT | Performed by: PSYCHIATRY & NEUROLOGY

## 2022-05-27 PROCEDURE — 85025 COMPLETE CBC W/AUTO DIFF WBC: CPT | Performed by: PSYCHIATRY & NEUROLOGY

## 2022-05-27 RX ADMIN — MEMANTINE 10 MG: 10 TABLET ORAL at 20:21

## 2022-05-27 RX ADMIN — MEMANTINE 10 MG: 10 TABLET ORAL at 08:38

## 2022-05-27 RX ADMIN — GABAPENTIN 100 MG: 100 CAPSULE ORAL at 17:24

## 2022-05-27 RX ADMIN — BENZTROPINE MESYLATE 0.5 MG: 0.5 TABLET ORAL at 20:21

## 2022-05-27 RX ADMIN — BUSPIRONE HYDROCHLORIDE 30 MG: 15 TABLET ORAL at 08:38

## 2022-05-27 RX ADMIN — NYSTATIN: 100000 CREAM TOPICAL at 20:26

## 2022-05-27 RX ADMIN — GABAPENTIN 100 MG: 100 CAPSULE ORAL at 08:38

## 2022-05-27 RX ADMIN — HALOPERIDOL 5 MG: 5 TABLET ORAL at 20:20

## 2022-05-27 RX ADMIN — LEVOTHYROXINE SODIUM 88 MCG: 0.09 TABLET ORAL at 08:38

## 2022-05-27 RX ADMIN — ASPIRIN 81 MG: 81 TABLET, COATED ORAL at 08:38

## 2022-05-27 RX ADMIN — MIRTAZAPINE 15 MG: 15 TABLET, FILM COATED ORAL at 20:24

## 2022-05-27 RX ADMIN — GABAPENTIN 100 MG: 100 CAPSULE ORAL at 13:02

## 2022-05-27 RX ADMIN — DONEPEZIL HYDROCHLORIDE 10 MG: 10 TABLET ORAL at 20:20

## 2022-05-27 RX ADMIN — MEGESTROL ACETATE 20 MG: 20 TABLET ORAL at 08:38

## 2022-05-27 RX ADMIN — BUSPIRONE HYDROCHLORIDE 30 MG: 15 TABLET ORAL at 20:20

## 2022-05-27 RX ADMIN — Medication 5 MG: at 20:20

## 2022-05-27 RX ADMIN — CLOZAPINE 200 MG: 100 TABLET ORAL at 20:20

## 2022-05-27 RX ADMIN — SALMETEROL XINAFOATE 1 PUFF: 50 POWDER, METERED ORAL; RESPIRATORY (INHALATION) at 20:21

## 2022-05-27 RX ADMIN — BENZTROPINE MESYLATE 0.5 MG: 0.5 TABLET ORAL at 08:38

## 2022-05-27 RX ADMIN — SALMETEROL XINAFOATE 1 PUFF: 50 POWDER, METERED ORAL; RESPIRATORY (INHALATION) at 08:38

## 2022-05-27 ASSESSMENT — ACTIVITIES OF DAILY LIVING (ADL)
DRESS: SCRUBS (BEHAVIORAL HEALTH);PROMPTS
ORAL_HYGIENE: WITH ASSISTANCE
HYGIENE/GROOMING: PROMPTS
ORAL_HYGIENE: PROMPTS
HYGIENE/GROOMING: WITH ASSISTANCE
DRESS: WITH ASSISTANCE

## 2022-05-27 NOTE — PLAN OF CARE
"  Problem: Behavior Regulation Impairment (Psychotic Signs/Symptoms)  Goal: Improved Behavioral Control (Psychotic Signs/Symptoms)  Outcome: Met   Goal Outcome Evaluation:                Nursing Assessment    Psychosis (H) [F29]    Admit Date: 1/26/2022    Length of Stay: 121    Patient evaluation continues. Assessed mood,anxiety,thoughts and behavior. Patient is  progressing towards goals. Patient is encouraged to participate in groups and assisted to develop healthy coping skills.  Patient admits to auditory hallucinations. /83 (BP Location: Right arm, Patient Position: Supine, Cuff Size: Adult Regular)   Pulse 107   Temp 97.7  F (36.5  C) (Temporal)   Resp 22   Ht 1.778 m (5' 10\")   Wt 77.2 kg (170 lb 4.8 oz)   SpO2 98%   BMI 24.44 kg/m      Mood: flat    Patient reports depression denies and reports anxiety denies    Affect:flat blunted    Sleep: slept 11 hours last night    Appetite: good    SI: denies    HI: denies    SIB: denies      Medication Compliance yes    Group participation: none except bingo    ADL's: assist    Fall risk interventions: proper foot wear, orthostatic B/P    Rinku Score Interventions: none    Discharge planning in process    Refer to daily team meeting notes for individualized plan of care. Nursing will continue to assess.    *Scale is 1-10 and 10 is the worst.             "

## 2022-05-27 NOTE — PLAN OF CARE
Problem: Sleep Disturbance (Psychotic Signs/Symptoms)  Goal: Improved Sleep (Psychotic Signs/Symptoms)  Outcome: Ongoing, Progressing   Goal Outcome Evaluation:        Slept well for 11 hours tonight,ndependent in repositioning in bed.  No behavioral issues encountered this shift.  Scheduled for lab this morning, CBC w/ Platelets and Differential  and Asymptomatic COVID test.

## 2022-05-28 PROCEDURE — 250N000013 HC RX MED GY IP 250 OP 250 PS 637: Performed by: PSYCHIATRY & NEUROLOGY

## 2022-05-28 PROCEDURE — 124N000003 HC R&B MH SENIOR/ADOLESCENT

## 2022-05-28 RX ADMIN — Medication 5 MG: at 20:31

## 2022-05-28 RX ADMIN — HALOPERIDOL 5 MG: 5 TABLET ORAL at 20:30

## 2022-05-28 RX ADMIN — SALMETEROL XINAFOATE 1 PUFF: 50 POWDER, METERED ORAL; RESPIRATORY (INHALATION) at 20:33

## 2022-05-28 RX ADMIN — BUSPIRONE HYDROCHLORIDE 30 MG: 15 TABLET ORAL at 09:01

## 2022-05-28 RX ADMIN — DONEPEZIL HYDROCHLORIDE 10 MG: 10 TABLET ORAL at 20:31

## 2022-05-28 RX ADMIN — NYSTATIN: 100000 CREAM TOPICAL at 09:02

## 2022-05-28 RX ADMIN — MEMANTINE 10 MG: 10 TABLET ORAL at 09:01

## 2022-05-28 RX ADMIN — NYSTATIN: 100000 CREAM TOPICAL at 20:33

## 2022-05-28 RX ADMIN — BENZTROPINE MESYLATE 0.5 MG: 0.5 TABLET ORAL at 20:30

## 2022-05-28 RX ADMIN — GABAPENTIN 100 MG: 100 CAPSULE ORAL at 17:29

## 2022-05-28 RX ADMIN — BENZTROPINE MESYLATE 0.5 MG: 0.5 TABLET ORAL at 09:01

## 2022-05-28 RX ADMIN — LEVOTHYROXINE SODIUM 88 MCG: 0.09 TABLET ORAL at 09:01

## 2022-05-28 RX ADMIN — CLOZAPINE 200 MG: 100 TABLET ORAL at 20:30

## 2022-05-28 RX ADMIN — MEGESTROL ACETATE 20 MG: 20 TABLET ORAL at 09:01

## 2022-05-28 RX ADMIN — BUSPIRONE HYDROCHLORIDE 30 MG: 15 TABLET ORAL at 20:31

## 2022-05-28 RX ADMIN — SALMETEROL XINAFOATE 1 PUFF: 50 POWDER, METERED ORAL; RESPIRATORY (INHALATION) at 09:01

## 2022-05-28 RX ADMIN — MIRTAZAPINE 15 MG: 15 TABLET, FILM COATED ORAL at 20:30

## 2022-05-28 RX ADMIN — GABAPENTIN 100 MG: 100 CAPSULE ORAL at 12:25

## 2022-05-28 RX ADMIN — MEMANTINE 10 MG: 10 TABLET ORAL at 20:31

## 2022-05-28 RX ADMIN — GABAPENTIN 100 MG: 100 CAPSULE ORAL at 09:01

## 2022-05-28 RX ADMIN — ASPIRIN 81 MG: 81 TABLET, COATED ORAL at 09:01

## 2022-05-28 ASSESSMENT — ACTIVITIES OF DAILY LIVING (ADL)
ORAL_HYGIENE: WITH ASSISTANCE
DRESS: WITH ASSISTANCE
ORAL_HYGIENE: WITH ASSISTANCE
HYGIENE/GROOMING: WITH ASSISTANCE
HYGIENE/GROOMING: WITH ASSISTANCE
DRESS: WITH ASSISTANCE

## 2022-05-28 NOTE — PLAN OF CARE
Problem: Sleep Disturbance (Psychotic Signs/Symptoms)  Goal: Improved Sleep (Psychotic Signs/Symptoms)  Outcome: Ongoing, Progressing   Goal Outcome Evaluation:        Slept well for 9.5 hours  No behavioral concerns encountered tonight.

## 2022-05-28 NOTE — PLAN OF CARE
"  Problem: Psychomotor Impairment (Psychotic Signs/Symptoms)Nursing Assessment    Psychosis (H) [F29]    Admit Date: 1/26/2022    Length of Stay: 122    Patient evaluation continues. Assessed mood,anxiety,thoughts and behavior. Patient is progressing towards goals. Patient is encouraged to participate in groups and assisted to develop healthy coping skills.  Patient admits to auditory hallucinations. /74   Pulse 101   Temp 98.6  F (37  C) (Temporal)   Resp 22   Ht 1.778 m (5' 10\")   Wt 77.2 kg (170 lb 4.8 oz)   SpO2 97%   BMI 24.44 kg/m      Mood: will not elaborate on mood    Patient reports depression no and reports anxiety no    Affect:flat blunted    Sleep: good    Appetite: poor at breakfast good at lunch    SI: denies    HI: denies    SIB: denies      Medication Compliance yes    Group participation: none    ADL's: assist, refuses much of the time. Bed bath today    Fall risk interventions: proper foot wear, orthostatic B/p    Rinku Score Interventions:none     Discharge planning in process    Refer to daily team meeting notes for individualized plan of care. Nursing will continue to assess.    *Scale is 1-10 and 10 is the worst.         Goal: Improved Psychomotor Symptoms (Psychotic Signs/Symptoms)  Intervention: Manage Psychomotor Movement  Recent Flowsheet Documentation  Taken 5/28/2022 1100 by Wen Broderick, RN  Patient Performed Hygiene: (assisted with bed bath per PA) bath, partial   Goal Outcome Evaluation:                      "
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
## 2022-05-28 NOTE — PLAN OF CARE
Goal Outcome Evaluation:    Out to the lounge usually only at snack/mealtime.  Denies anxiety, depression.  Refused covid test again tonight.

## 2022-05-28 NOTE — PLAN OF CARE
Goal Outcome Evaluation:    Patient continues to isolate to room.  When asked if he has anxiety or depression he answers yes but denies SI.  Eating and drinking adequately.  P:  Continue same plan of care.

## 2022-05-29 PROCEDURE — 250N000013 HC RX MED GY IP 250 OP 250 PS 637: Performed by: PSYCHIATRY & NEUROLOGY

## 2022-05-29 PROCEDURE — 124N000003 HC R&B MH SENIOR/ADOLESCENT

## 2022-05-29 RX ADMIN — HALOPERIDOL 5 MG: 5 TABLET ORAL at 19:59

## 2022-05-29 RX ADMIN — SALMETEROL XINAFOATE 1 PUFF: 50 POWDER, METERED ORAL; RESPIRATORY (INHALATION) at 10:14

## 2022-05-29 RX ADMIN — GABAPENTIN 100 MG: 100 CAPSULE ORAL at 12:28

## 2022-05-29 RX ADMIN — BUSPIRONE HYDROCHLORIDE 30 MG: 15 TABLET ORAL at 19:59

## 2022-05-29 RX ADMIN — MIRTAZAPINE 15 MG: 15 TABLET, FILM COATED ORAL at 19:59

## 2022-05-29 RX ADMIN — LEVOTHYROXINE SODIUM 88 MCG: 0.09 TABLET ORAL at 08:58

## 2022-05-29 RX ADMIN — BUSPIRONE HYDROCHLORIDE 30 MG: 15 TABLET ORAL at 08:57

## 2022-05-29 RX ADMIN — GABAPENTIN 100 MG: 100 CAPSULE ORAL at 17:26

## 2022-05-29 RX ADMIN — MEGESTROL ACETATE 20 MG: 20 TABLET ORAL at 08:58

## 2022-05-29 RX ADMIN — NYSTATIN: 100000 CREAM TOPICAL at 10:14

## 2022-05-29 RX ADMIN — DONEPEZIL HYDROCHLORIDE 10 MG: 10 TABLET ORAL at 19:59

## 2022-05-29 RX ADMIN — GABAPENTIN 100 MG: 100 CAPSULE ORAL at 08:58

## 2022-05-29 RX ADMIN — ASPIRIN 81 MG: 81 TABLET, COATED ORAL at 08:57

## 2022-05-29 RX ADMIN — Medication 5 MG: at 19:59

## 2022-05-29 RX ADMIN — BENZTROPINE MESYLATE 0.5 MG: 0.5 TABLET ORAL at 08:57

## 2022-05-29 RX ADMIN — MEMANTINE 10 MG: 10 TABLET ORAL at 08:58

## 2022-05-29 RX ADMIN — MEMANTINE 10 MG: 10 TABLET ORAL at 19:59

## 2022-05-29 RX ADMIN — BENZTROPINE MESYLATE 0.5 MG: 0.5 TABLET ORAL at 19:59

## 2022-05-29 RX ADMIN — SALMETEROL XINAFOATE 1 PUFF: 50 POWDER, METERED ORAL; RESPIRATORY (INHALATION) at 20:01

## 2022-05-29 RX ADMIN — CLOZAPINE 250 MG: 100 TABLET ORAL at 20:28

## 2022-05-29 ASSESSMENT — ACTIVITIES OF DAILY LIVING (ADL)
DRESS: WITH SUPERVISION;WITH ASSISTANCE
ORAL_HYGIENE: WITH ASSISTANCE
HYGIENE/GROOMING: WITH ASSISTANCE

## 2022-05-29 NOTE — PLAN OF CARE
Problem: Sleep Disturbance (Psychotic Signs/Symptoms)  Goal: Improved Sleep (Psychotic Signs/Symptoms)  Outcome: Ongoing, Progressing   Goal Outcome Evaluation:                Slept well for 9 hours  Shifts position in bed from time to time  No behavioral issues encountered

## 2022-05-29 NOTE — PLAN OF CARE
"  Problem: Fall Injury Risk  Goal: Absence of Fall and Fall-Related Injury  Intervention: Identify and Manage Contributors  Recent Flowsheet Documentation  Taken 5/29/2022 3534 by Wen Broderick, RN  Medication Review/Management: medications reviewed     Problem: Fall Injury Risk  Goal: Absence of Fall and Fall-Related Injury  Intervention: Promote Injury-Free Environment  Flowsheets  Taken 5/29/2022 4164  Safety Promotion/Fall Prevention:   assistive device/personal items within reach   check orthostatic blood pressure   clutter free environment maintained   increased rounding and observation   nonskid shoes/slippers when out of bed   patient and family educatioNursing Assessment    Psychosis (H) [F29]    Admit Date: 1/26/2022    Length of Stay: 123    Patient evaluation continues. Assessed mood,anxiety,thoughts and behavior. Patient is  progressing towards goals. Patient is encouraged to participate in groups and assisted to develop healthy coping skills.  Patient denies auditory or visual hallucinations this shift. /81 (BP Location: Left arm, Patient Position: Sitting, Cuff Size: Adult Regular)   Pulse 91   Temp 97.6  F (36.4  C) (Oral)   Resp 18   Ht 1.778 m (5' 10\")   Wt 77.3 kg (170 lb 8 oz)   SpO2 96%   BMI 24.46 kg/m      Mood: ok as per pt, poverty of emotion    Patient reports depression no and reports anxiety no    Affect:flat blunted     Sleep: good    Appetite: good    SI: denies    HI: denies    SIB: denies      Medication Compliance yes    Group participation:none    ADL's: assisted, needs to be encouraged to shower and if refuses is assisted with a bed bath.    Fall risk interventions: proper foot wear, orthostatic B/P, close monitoring. Pt was slightly unsteady when ambulating after lunch    Rinku Score Interventions: none    Discharge planning  in process    Refer to daily team meeting notes for individualized plan of care. Nursing will continue to assess.    *Scale is 1-10 and 10 is " the worst.       n   safety round/check completed   treat reversible contributory factors  Taken 5/29/2022 6991  Safety Promotion/Fall Prevention:   assistive device/personal items within reach   check orthostatic blood pressure   clutter free environment maintained   fall prevention program maintained   nonskid shoes/slippers when out of bed   safety round/check completed   treat reversible contributory factors

## 2022-05-30 LAB
ANION GAP SERPL CALCULATED.3IONS-SCNC: 9 MMOL/L (ref 3–14)
BUN SERPL-MCNC: 19 MG/DL (ref 7–30)
CALCIUM SERPL-MCNC: 8.8 MG/DL (ref 8.5–10.1)
CHLORIDE BLD-SCNC: 116 MMOL/L (ref 94–109)
CO2 SERPL-SCNC: 23 MMOL/L (ref 20–32)
CREAT SERPL-MCNC: 0.72 MG/DL (ref 0.66–1.25)
ERYTHROCYTE [DISTWIDTH] IN BLOOD BY AUTOMATED COUNT: 13.7 % (ref 10–15)
GFR SERPL CREATININE-BSD FRML MDRD: >90 ML/MIN/1.73M2
GLUCOSE BLD-MCNC: 103 MG/DL (ref 70–99)
HCT VFR BLD AUTO: 41.1 % (ref 40–53)
HGB BLD-MCNC: 14.4 G/DL (ref 13.3–17.7)
MCH RBC QN AUTO: 32.1 PG (ref 26.5–33)
MCHC RBC AUTO-ENTMCNC: 35 G/DL (ref 31.5–36.5)
MCV RBC AUTO: 92 FL (ref 78–100)
PLATELET # BLD AUTO: 171 10E3/UL (ref 150–450)
POTASSIUM BLD-SCNC: 3.9 MMOL/L (ref 3.4–5.3)
RBC # BLD AUTO: 4.48 10E6/UL (ref 4.4–5.9)
SODIUM SERPL-SCNC: 148 MMOL/L (ref 133–144)
TROPONIN I SERPL HS-MCNC: <3 NG/L
WBC # BLD AUTO: 6.1 10E3/UL (ref 4–11)

## 2022-05-30 PROCEDURE — 99231 SBSQ HOSP IP/OBS SF/LOW 25: CPT | Mod: GT | Performed by: PSYCHIATRY & NEUROLOGY

## 2022-05-30 PROCEDURE — 250N000013 HC RX MED GY IP 250 OP 250 PS 637: Performed by: PODIATRIST

## 2022-05-30 PROCEDURE — 80048 BASIC METABOLIC PNL TOTAL CA: CPT | Performed by: PHYSICIAN ASSISTANT

## 2022-05-30 PROCEDURE — 124N000003 HC R&B MH SENIOR/ADOLESCENT

## 2022-05-30 PROCEDURE — 250N000013 HC RX MED GY IP 250 OP 250 PS 637: Performed by: PSYCHIATRY & NEUROLOGY

## 2022-05-30 PROCEDURE — 93010 ELECTROCARDIOGRAM REPORT: CPT | Performed by: INTERNAL MEDICINE

## 2022-05-30 PROCEDURE — 36415 COLL VENOUS BLD VENIPUNCTURE: CPT | Performed by: PHYSICIAN ASSISTANT

## 2022-05-30 PROCEDURE — 93005 ELECTROCARDIOGRAM TRACING: CPT

## 2022-05-30 PROCEDURE — 84484 ASSAY OF TROPONIN QUANT: CPT | Performed by: PHYSICIAN ASSISTANT

## 2022-05-30 PROCEDURE — 85027 COMPLETE CBC AUTOMATED: CPT | Performed by: PHYSICIAN ASSISTANT

## 2022-05-30 PROCEDURE — 99232 SBSQ HOSP IP/OBS MODERATE 35: CPT | Performed by: PHYSICIAN ASSISTANT

## 2022-05-30 RX ORDER — HALOPERIDOL 1 MG/1
2 TABLET ORAL AT BEDTIME
Status: DISCONTINUED | OUTPATIENT
Start: 2022-05-30 | End: 2022-07-11

## 2022-05-30 RX ADMIN — DONEPEZIL HYDROCHLORIDE 10 MG: 10 TABLET ORAL at 20:15

## 2022-05-30 RX ADMIN — GABAPENTIN 100 MG: 100 CAPSULE ORAL at 17:16

## 2022-05-30 RX ADMIN — BENZTROPINE MESYLATE 0.5 MG: 0.5 TABLET ORAL at 10:01

## 2022-05-30 RX ADMIN — SALMETEROL XINAFOATE 1 PUFF: 50 POWDER, METERED ORAL; RESPIRATORY (INHALATION) at 10:01

## 2022-05-30 RX ADMIN — MEMANTINE 10 MG: 10 TABLET ORAL at 20:15

## 2022-05-30 RX ADMIN — BUSPIRONE HYDROCHLORIDE 30 MG: 15 TABLET ORAL at 20:15

## 2022-05-30 RX ADMIN — GABAPENTIN 100 MG: 100 CAPSULE ORAL at 10:01

## 2022-05-30 RX ADMIN — LEVOTHYROXINE SODIUM 88 MCG: 0.09 TABLET ORAL at 10:00

## 2022-05-30 RX ADMIN — Medication 5 MG: at 20:15

## 2022-05-30 RX ADMIN — GABAPENTIN 100 MG: 100 CAPSULE ORAL at 13:31

## 2022-05-30 RX ADMIN — BENZTROPINE MESYLATE 0.5 MG: 0.5 TABLET ORAL at 20:15

## 2022-05-30 RX ADMIN — MIRTAZAPINE 15 MG: 15 TABLET, FILM COATED ORAL at 20:15

## 2022-05-30 RX ADMIN — SALMETEROL XINAFOATE 1 PUFF: 50 POWDER, METERED ORAL; RESPIRATORY (INHALATION) at 20:14

## 2022-05-30 RX ADMIN — HALOPERIDOL 2 MG: 1 TABLET ORAL at 20:17

## 2022-05-30 RX ADMIN — NYSTATIN: 100000 CREAM TOPICAL at 20:23

## 2022-05-30 RX ADMIN — MEMANTINE 10 MG: 10 TABLET ORAL at 10:01

## 2022-05-30 RX ADMIN — MEGESTROL ACETATE 20 MG: 20 TABLET ORAL at 10:01

## 2022-05-30 RX ADMIN — CLOZAPINE 225 MG: 100 TABLET ORAL at 20:18

## 2022-05-30 RX ADMIN — ASPIRIN 81 MG: 81 TABLET, COATED ORAL at 10:00

## 2022-05-30 RX ADMIN — BUSPIRONE HYDROCHLORIDE 30 MG: 15 TABLET ORAL at 10:00

## 2022-05-30 ASSESSMENT — ACTIVITIES OF DAILY LIVING (ADL)
DRESS: PROMPTS
HYGIENE/GROOMING: WITH ASSISTANCE
ORAL_HYGIENE: WITH ASSISTANCE

## 2022-05-30 NOTE — PLAN OF CARE
Goal Outcome Evaluation:    Appetite poor tonight.  Out of room only to play bingo.  Calm and quiet.  Did not wish to have 1:1 check in tonight.  Fluid intake is fair.

## 2022-05-30 NOTE — PLAN OF CARE
Goal Outcome Evaluation:    Plan of Care Reviewed With: patientPlan of Care Reviewed With: patient    Patient continues to isolate to his room.  calm, quiet, cooperative.  Med compliant.  Doesn't attend group unless it is bingo. Appetite is poor, eating only about 30% of his supper.  Took in approx 350cc tonight.  States he is a little dizzy when up.  Standby assist given when up walking.  Fairly steady on his feet.  P:  Continue same plan of care.    Noted Na elevated on labs today elevated.  Notified crossover PA.

## 2022-05-30 NOTE — PROGRESS NOTES
Pt was in his room and walked out for breakfast, unsteady at times and needed standby assist. Pt vital signs 97 temp. P.116 R 16 b/P 115/83. Pt denies any chest pain or discomfort but is a poor historian and does not talk much. Pt easily winded. IM notified.    1330 vital signs 97.8 oral P. 110 R 16 B/P 86/50 laying and pt was encouraged to sit up and drink fluid, 5 minutes after B/P 100/58. Pt denies any pain or discomfort.

## 2022-05-30 NOTE — PROGRESS NOTES
Medicine follow up    EKG is stable vs prior on 5/9/22 with persistent RBBB, no ST-T wave abnormality    - no indication for TTE or further testing based on EKG  - see my progress note today for further details    Milagros Ray PA-C  [facility]  Contact information available via [facility] Paging/Directory

## 2022-05-30 NOTE — PLAN OF CARE
Problem: Sleep Disturbance (Psychotic Signs/Symptoms)  Goal: Improved Sleep (Psychotic Signs/Symptoms)  Outcome: Ongoing, Progressing   Goal Outcome Evaluation:             Slept all night for 9.5 hours.  No requests/ complaints made.

## 2022-05-30 NOTE — PLAN OF CARE
"  Problem: Behavior Regulation Impairment (Psychotic Signs/Symptoms)  Goal: Improved Behavioral Control (Psychotic Signs/Symptoms)  Outcome: Met   Goal Outcome Evaluation:             Nursing Assessment    Psychosis (H) [F29]    Admit Date: 1/26/2022    Length of Stay: 124    Patient evaluation continues. Assessed mood,anxiety,thoughts and behavior. Patient is  progressing towards goals. Patient is encouraged to participate in groups and assisted to develop healthy coping skills.  Patient denies auditory or visual hallucinations. /83 (BP Location: Right arm, Patient Position: Sitting)   Pulse 117   Temp 97.3  F (36.3  C) (Oral)   Resp 16   Ht 1.778 m (5' 10\")   Wt 77.3 kg (170 lb 8 oz)   SpO2 98%   BMI 24.46 kg/m    Pt needs standby assist when ambulating  Mood: poor    Patient reports depression 0/10 and reports anxiety none    Affect: flat,blunted    Sleep: good    Appetite: good    SI: denies    HI: denies    SIB: denies      Medication Compliance yes    Group participation:no    ADL's: needs assist and encouragement    Fall risk interventions: proper foot wear, orthostatic B/P    Rinku Score Interventions: none    Discharge planning in process    Refer to daily team meeting notes for individualized plan of care. Nursing will continue to assess.    *Scale is 1-10 and 10 is the worst.                "

## 2022-05-30 NOTE — PROGRESS NOTES
"PSYCHIATRY  PROGRESS NOTE     DATE OF SERVICE   05/30/2022  The patient is a 56 year old male who is being evaluated via a video billable telemedicine visit. The patient/guardian has consented to being seen via telemedicine. The provider was in front of a computer in a home office. The patient was on the inpatient unit at [facility]     Start time: 11:24 AM  Stop time: 11:30 AM    The patient/guardian has been notified of the following:     This telemedicine visit is conducted live between you and your clinician. We have found that certain health care needs can be provided without the need for a physical exam. This service lets us provide the care you need with a telemedicine conversation.           CHIEF COMPLAINT   Patient was admitted due to inability to safely care for himself and psychosis.       SUBJECTIVE   Nursing reports: Patient seem to be more withdrawn and confused.  He also has been experiencing poor appetite.  His heart rate has been elevated between 114 and 100.      report: There has been no updates on the guardianship.     OBJECTIVE   Patient was seen and evaluated at bedside by himself, this was done with the use of telehealth.  Patient reported that he is doing \"all right\".  When I tried to engage the patient in a conversation he just kept staring at this writer with not answering my questions.  He was very disengaged and internally focused.    On Friday Clozaril was increased to 250 mg and Haldol was increased to 5 mg.  Initially the nursing staff said that the increase on the doses of the medications helped with the motivation but the patient seemed to be declining since yesterday.  I discussed the patient with the charge nurse and we will decrease the Clozaril to 225 mg and the Haldol to 2 mg.  We will continue monitoring the heart rate and see if the tachycardia improves with the adjustments in the medications.  I also asked the charge nurse to let the medical team and let " "them know what is going on with John as he does not seem to be at his baseline.       MEDICATIONS   Medications:  Scheduled Meds:    aspirin  81 mg Oral Daily     benztropine  0.5 mg Oral BID     busPIRone HCl  30 mg Oral BID     cloZAPine  225 mg Oral At Bedtime     donepezil  10 mg Oral At Bedtime     gabapentin  100 mg Oral TID w/meals     haloperidol  2 mg Oral At Bedtime     levothyroxine  88 mcg Oral Daily     megestrol  20 mg Oral Daily     melatonin  5 mg Oral At Bedtime     memantine  10 mg Oral BID     mirtazapine  15 mg Oral At Bedtime     nystatin   Topical BID     salmeterol  1 puff Inhalation BID     Continuous Infusions:  PRN Meds:.[Held by provider] acetaminophen, albuterol, alum & mag hydroxide-simethicone, benzocaine-menthol, hydrOXYzine, nicotine, polyethylene glycol, polyethylene glycol-propylene glycol PF, senna-docusate    Medication adherence issues: MS Med Adherence Y/N: Yes, Hospitalization  Medication side effects: MEDICATION SIDE EFFECTS: no side effects reported  Benefit: Yes / No: Yes       ROS   A comprehensive review of systems was negative.       MENTAL STATUS EXAM   Vitals: /83 (BP Location: Right arm, Patient Position: Sitting)   Pulse 117   Temp 97.3  F (36.3  C) (Oral)   Resp 16   Ht 1.778 m (5' 10\")   Wt 77.3 kg (170 lb 8 oz)   SpO2 98%   BMI 24.46 kg/m      Appearance:  No apparent distress  Mood: \"I doing all right.\"  Affect: Blunted  Suicidal Ideation: Unable to assess  Homicidal Ideation: Unable to assess  Thought process: concrete  Thought content: Remains confused and delusional.    Fund of Knowledge: Below average  Attention/Concentration: Poor  Language ability: Significantly impaired  Memory: Global memory impairment  Insight:  Poor.  Judgement: Poor  Orientation: Only to person  Psychomotor Behavior: slowed    Muscle Strength and Tone: MuscleStrength: Normal and Atrophy  Gait and Station: Not evaluated       LABS   personally reviewed.     No results " found for: PHENYTOIN, PHENOBARB, VALPROATE, CBMZ       DIAGNOSIS   Principal Problem:    Major neurocognitive disorder, due to multiple etiologies, with behavioral disturbance, severe (H)    Active Problem List:  Patient Active Problem List   Diagnosis     TBI with aggressive behavior     HTN (hypertension)     COPD (chronic obstructive pulmonary disease) (H)     Dementia with behavioral disturbance (H)     Chronic schizophrenia (H)     Smoker     Agitation     Behavior disturbance     Psychosis (H)     Major neurocognitive disorder, due to multiple etiologies, with behavioral disturbance, severe (H)     Paranoid schizophrenia, chronic condition with acute exacerbation (H)     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 Ongoing education given regarding diagnostic and treatment options with risks, benefits and alternatives and adequate verbalization of understanding.  2.  Medications       BuSpar 30 mg 2 times daily       decrease Clozaril 225 mg at bedtime       Aricept 10 mg at bedtime       Gabapentin 100 mg 3 times a day       decrease Haldol 2 mg at bedtime       Namenda 10 mg 2 times a day       Remeron 15 mg at bedtime       melatonin 5 mg at bedtime  3.  Medical team following the patient   4.   coordinating a safe discharge plan with the patient.    Risk Assessment: [facility] RISK ASSESSMENT: Patient able to contract for safety    Coordination of Care:   Treatment Plan reviewed and physician signed, Care discussed with Care/Treatment Team Members, Chart reviewed and Patient seen      Re-Certification I certify that the inpatient psychiatric facility services furnished since the previous certification were, and continue to be, medically necessary for, either, treatment which could reasonably be expected to improve the patient s  condition or diagnostic study and that the hospital records indicate that the services furnished were, either, intensive treatment services, admission and related services necessary for diagnostic study, or equivalent services.     I certify that the patient continues to need, on a daily basis, active treatment furnished directly by or requiring the supervision of inpatient psychiatric facility personnel.   I estimate 14 days of hospitalization is necessary for proper treatment of the patient. My plans for post-hospital care for this patient are  TBD     Ginger Dubon MD    -     05/30/2022  -     12:38 PM    Total time 15 minutes with > 50%spent on coordination of cares and psycho-education.    This note was created with help of Dragon dictation system. Grammatical / typing errors are not intentional.    Ginger Dubon MD

## 2022-05-30 NOTE — PROGRESS NOTES
"Brief Medicine Follow Up Note    Following up regarding patient feeling increasingly tired and appears short of breath walking down the halls.      Patient intermittently answers questions and therefore ROS and history are limited.     He endorses feeling increased tiredness and endorses feeling dizzy and lightheaded and short of breath with movement, including sitting up and laying down.  He does not answer as to whether he has pain, chest pain, palpitations, is tolerating po intake.  He has not been noted to have nausea or emesis and urine is still franko per nursing.     Today's vital signs, medications, and nursing notes were reviewed. Labs reviewed most recent serum and urine laboratories.     /83 (BP Location: Right arm, Patient Position: Sitting)   Pulse 117   Temp 97.3  F (36.3  C) (Oral)   Resp 16   Ht 1.778 m (5' 10\")   Wt 77.3 kg (170 lb 8 oz)   SpO2 98%   BMI 24.46 kg/m    General: A&O. NAD. Able to roll to side independently.   CV: systolic murmur, RRR  PULM: dry cough on exam and otherwise CTAB  HEENT: NC AT, tracking, pupils equal and round  Extremities: moves all extremities, 5/5 /bicep/tricep/hip flexion  Skin: noncyanotic, anicteric, no rashes on exposed skin    A/P:  Orthostatic hypotension  Lethargy  Occurs in the setting of increased antipsychotic medication doses and likely poor po intake and deconditioning  - encourage po fluids  - consider decrease antipsychotics to prior doses and increase slowly if needed  - will obtain EKG, CBC, BMP, trop if patient allows  - low threshold to obtain TTE if new cardopulm sx or abnormal EKG  - fall precautions  - low threshold to give IV fluids if poor po intake ensues or persistent orthostasis despite decreased antipsychotics    Medicine will follow vitals, trop, CBC, BMP and nursing notes, please call if questions or concerns     Milagros Ray PA-C  [facility]  Contact information available " via [facility] Paging/Directory

## 2022-05-31 LAB
ANION GAP SERPL CALCULATED.3IONS-SCNC: 8 MMOL/L (ref 3–14)
ATRIAL RATE - MUSE: 96 BPM
BUN SERPL-MCNC: 19 MG/DL (ref 7–30)
CALCIUM SERPL-MCNC: 8.7 MG/DL (ref 8.5–10.1)
CHLORIDE BLD-SCNC: 115 MMOL/L (ref 94–109)
CO2 SERPL-SCNC: 21 MMOL/L (ref 20–32)
CREAT SERPL-MCNC: 0.66 MG/DL (ref 0.66–1.25)
DIASTOLIC BLOOD PRESSURE - MUSE: NORMAL MMHG
GFR SERPL CREATININE-BSD FRML MDRD: >90 ML/MIN/1.73M2
GLUCOSE BLD-MCNC: 97 MG/DL (ref 70–99)
INTERPRETATION ECG - MUSE: NORMAL
P AXIS - MUSE: 75 DEGREES
POTASSIUM BLD-SCNC: 3.9 MMOL/L (ref 3.4–5.3)
PR INTERVAL - MUSE: 162 MS
QRS DURATION - MUSE: 112 MS
QT - MUSE: 368 MS
QTC - MUSE: 464 MS
R AXIS - MUSE: 96 DEGREES
SODIUM SERPL-SCNC: 144 MMOL/L (ref 133–144)
SYSTOLIC BLOOD PRESSURE - MUSE: NORMAL MMHG
T AXIS - MUSE: 47 DEGREES
VENTRICULAR RATE- MUSE: 96 BPM

## 2022-05-31 PROCEDURE — 250N000013 HC RX MED GY IP 250 OP 250 PS 637: Performed by: PSYCHIATRY & NEUROLOGY

## 2022-05-31 PROCEDURE — 99231 SBSQ HOSP IP/OBS SF/LOW 25: CPT | Mod: GT | Performed by: PSYCHIATRY & NEUROLOGY

## 2022-05-31 PROCEDURE — 82310 ASSAY OF CALCIUM: CPT | Performed by: PHYSICIAN ASSISTANT

## 2022-05-31 PROCEDURE — 124N000003 HC R&B MH SENIOR/ADOLESCENT

## 2022-05-31 PROCEDURE — 36415 COLL VENOUS BLD VENIPUNCTURE: CPT | Performed by: PHYSICIAN ASSISTANT

## 2022-05-31 RX ADMIN — DONEPEZIL HYDROCHLORIDE 10 MG: 10 TABLET ORAL at 19:50

## 2022-05-31 RX ADMIN — Medication 5 MG: at 19:50

## 2022-05-31 RX ADMIN — BUSPIRONE HYDROCHLORIDE 30 MG: 15 TABLET ORAL at 19:50

## 2022-05-31 RX ADMIN — SALMETEROL XINAFOATE 1 PUFF: 50 POWDER, METERED ORAL; RESPIRATORY (INHALATION) at 19:53

## 2022-05-31 RX ADMIN — LEVOTHYROXINE SODIUM 88 MCG: 0.09 TABLET ORAL at 08:59

## 2022-05-31 RX ADMIN — GABAPENTIN 100 MG: 100 CAPSULE ORAL at 08:59

## 2022-05-31 RX ADMIN — NYSTATIN: 100000 CREAM TOPICAL at 19:53

## 2022-05-31 RX ADMIN — GABAPENTIN 100 MG: 100 CAPSULE ORAL at 17:20

## 2022-05-31 RX ADMIN — ASPIRIN 81 MG: 81 TABLET, COATED ORAL at 08:59

## 2022-05-31 RX ADMIN — MEMANTINE 10 MG: 10 TABLET ORAL at 08:59

## 2022-05-31 RX ADMIN — SALMETEROL XINAFOATE 1 PUFF: 50 POWDER, METERED ORAL; RESPIRATORY (INHALATION) at 09:03

## 2022-05-31 RX ADMIN — MIRTAZAPINE 15 MG: 15 TABLET, FILM COATED ORAL at 20:00

## 2022-05-31 RX ADMIN — MEMANTINE 10 MG: 10 TABLET ORAL at 19:50

## 2022-05-31 RX ADMIN — HALOPERIDOL 2 MG: 1 TABLET ORAL at 19:50

## 2022-05-31 RX ADMIN — CLOZAPINE 225 MG: 100 TABLET ORAL at 20:00

## 2022-05-31 RX ADMIN — MEGESTROL ACETATE 20 MG: 20 TABLET ORAL at 08:59

## 2022-05-31 RX ADMIN — GABAPENTIN 100 MG: 100 CAPSULE ORAL at 12:15

## 2022-05-31 RX ADMIN — BENZTROPINE MESYLATE 0.5 MG: 0.5 TABLET ORAL at 19:50

## 2022-05-31 RX ADMIN — BENZTROPINE MESYLATE 0.5 MG: 0.5 TABLET ORAL at 08:59

## 2022-05-31 RX ADMIN — BUSPIRONE HYDROCHLORIDE 30 MG: 15 TABLET ORAL at 08:59

## 2022-05-31 ASSESSMENT — ACTIVITIES OF DAILY LIVING (ADL)
HYGIENE/GROOMING: PROMPTS;WITH ASSISTANCE
DRESS: INDEPENDENT
ORAL_HYGIENE: PROMPTS
DRESS: INDEPENDENT
ORAL_HYGIENE: PROMPTS
LAUNDRY: UNABLE TO COMPLETE
HYGIENE/GROOMING: PROMPTS;WITH ASSISTANCE

## 2022-05-31 NOTE — PROGRESS NOTES
Brief Medicine Note:    RN notified cross cover to review labs obtained earlier today by medicine team. Patient has mild hypernatremia with Na 148. Last labs obtained 3 weeks ago with sodium 142.     - Encourage fluids and PO intake  - Trend BMP in AM    MANDO CampoverdeC  Internal Medicine [facility] Hospitalist  [facility]

## 2022-05-31 NOTE — PLAN OF CARE
Goal Outcome Evaluation:    Affect flat.  Isolates to room except for dinner.  Poor fluid intake approximately 350cc this shift. Calm, med compliant. Denies depression or auditory hallucinations tonight; states they come and go.  P:  Continue same plan of care.

## 2022-05-31 NOTE — PLAN OF CARE
Care coordination:  - Patient was discussed in team meeting.  - Guardianship court date is on 6/9 at 1:30.  - The [place] assessment can not be completed until the guardianship process is complete.  - Bettye from [facility] emailed writer to request the [place] 's contact info. Writer sent the info.

## 2022-05-31 NOTE — PLAN OF CARE
Problem: Behavior Regulation Impairment (Psychotic Signs/Symptoms)  Goal: Improved Behavioral Control (Psychotic Signs/Symptoms)  Outcome: Ongoing, Not Progressing     Problem: Cognitive Impairment (Psychotic Signs/Symptoms)  Goal: Optimal Cognitive Function (Psychotic Signs/Symptoms)  Outcome: Ongoing, Not Progressing  Intervention: Support and Promote Cognitive Ability  Recent Flowsheet Documentation  Taken 5/31/2022 1045 by Juarez Murphy RN  Trust Relationship/Rapport:   care explained   questions encouraged   thoughts/feelings acknowledged     Problem: Decreased Participation and Engagement (Psychotic Signs/Symptoms)  Goal: Increased Participation and Engagement (Psychotic Signs/Symptoms)  Outcome: Ongoing, Not Progressing   Goal Outcome Evaluation:    Patient is calm, isolative and withdrawn, unable to participate in check-in only saying a few words. Patient was medication compliant. Came out for meals, not social with peers and did not attend groups repeat BMP unremarkable, sodium is WNL. Staff is encouraged to assist patient in walking the halls for exercise to combat some of the deconditioning patient is experiencing. VSS, denies pain, no further concerns at this time.

## 2022-05-31 NOTE — PROGRESS NOTES
"PSYCHIATRY  PROGRESS NOTE     DATE OF SERVICE   05/31/2022  The patient is a 56 year old male who is being evaluated via a video billable telemedicine visit. The patient/guardian has consented to being seen via telemedicine. The provider was in front of a computer in a home office. The patient was on the inpatient unit at [facility]     Start time: 2:33 PM  Stop time: 2:43 PM    The patient/guardian has been notified of the following:     This telemedicine visit is conducted live between you and your clinician. We have found that certain health care needs can be provided without the need for a physical exam. This service lets us provide the care you need with a telemedicine conversation.           CHIEF COMPLAINT   Patient was admitted due to inability to safely care for himself and psychosis.       SUBJECTIVE   Nursing reports: Patient is calm, isolative and withdrawn, unable to participate in check-in only saying a few words. Patient was medication compliant. Came out for meals, not social with peers and did not attend groups repeat BMP unremarkable, sodium is WNL. Staff is encouraged to assist patient in walking the halls for exercise to combat some of the deconditioning patient is experiencing. VSS, denies pain, no further concerns at this time.      report: Guardianship hearing is scheduled for next week.     OBJECTIVE   Patient was seen and evaluated at bedside by himself, this was done with the use of telehealth.  Patient started the meeting by saying that he is doing all right but he quickly became agitated and started to verbalize profanity.  Patient started yelling at this writer saying that he does not understand \"why the fuck am I here in the hospital\".  Patient refused to listen to any information and at the end I ended the meeting as this was nonproductive.       MEDICATIONS   Medications:  Scheduled Meds:    aspirin  81 mg Oral Daily     benztropine  0.5 mg Oral BID     busPIRone " "HCl  30 mg Oral BID     cloZAPine  225 mg Oral At Bedtime     donepezil  10 mg Oral At Bedtime     gabapentin  100 mg Oral TID w/meals     haloperidol  2 mg Oral At Bedtime     levothyroxine  88 mcg Oral Daily     megestrol  20 mg Oral Daily     melatonin  5 mg Oral At Bedtime     memantine  10 mg Oral BID     mirtazapine  15 mg Oral At Bedtime     nystatin   Topical BID     salmeterol  1 puff Inhalation BID     Continuous Infusions:  PRN Meds:.[Held by provider] acetaminophen, albuterol, alum & mag hydroxide-simethicone, benzocaine-menthol, hydrOXYzine, nicotine, polyethylene glycol, polyethylene glycol-propylene glycol PF, senna-docusate    Medication adherence issues: MS Med Adherence Y/N: Yes, Hospitalization  Medication side effects: MEDICATION SIDE EFFECTS: no side effects reported  Benefit: Yes / No: Yes       ROS   A comprehensive review of systems was negative.       MENTAL STATUS EXAM   Vitals: /76 (BP Location: Left arm)   Pulse 108   Temp 97.5  F (36.4  C) (Temporal)   Resp 16   Ht 1.778 m (5' 10\")   Wt 77.3 kg (170 lb 8 oz)   SpO2 99%   BMI 24.46 kg/m      Appearance:  No apparent distress  Mood: \"I doing all right.\"  Affect: Irritable and hostile  Suicidal Ideation: Unable to assess  Homicidal Ideation: Unable to assess  Thought process: concrete  Thought content: Remains confused and delusional.    Fund of Knowledge: Below average  Attention/Concentration: Poor  Language ability: Significantly impaired  Memory: Global memory impairment  Insight:  Poor.  Judgement: Poor  Orientation: Only to person  Psychomotor Behavior: slowed    Muscle Strength and Tone: MuscleStrength: Normal and Atrophy  Gait and Station: Not evaluated       LABS   personally reviewed.     No results found for: PHENYTOIN, PHENOBARB, VALPROATE, CBMZ       DIAGNOSIS   Principal Problem:    Major neurocognitive disorder, due to multiple etiologies, with behavioral disturbance, severe (H)    Active Problem List:  Patient " Active Problem List   Diagnosis     TBI with aggressive behavior     HTN (hypertension)     COPD (chronic obstructive pulmonary disease) (H)     Dementia with behavioral disturbance (H)     Chronic schizophrenia (H)     Smoker     Agitation     Behavior disturbance     Psychosis (H)     Major neurocognitive disorder, due to multiple etiologies, with behavioral disturbance, severe (H)     Paranoid schizophrenia, chronic condition with acute exacerbation (H)     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 Ongoing education given regarding diagnostic and treatment options with risks, benefits and alternatives and adequate verbalization of understanding.  2.  Medications       BuSpar 30 mg 2 times daily       decrease Clozaril 225 mg at bedtime       Aricept 10 mg at bedtime       Gabapentin 100 mg 3 times a day       decrease Haldol 2 mg at bedtime       Namenda 10 mg 2 times a day       Remeron 15 mg at bedtime       melatonin 5 mg at bedtime  3.  Medical team following the patient   4.   coordinating a safe discharge plan with the patient.    Risk Assessment: [facility] RISK ASSESSMENT: Patient able to contract for safety    Coordination of Care:   Treatment Plan reviewed and physician signed, Care discussed with Care/Treatment Team Members, Chart reviewed and Patient seen      Re-Certification I certify that the inpatient psychiatric facility services furnished since the previous certification were, and continue to be, medically necessary for, either, treatment which could reasonably be expected to improve the patient s condition or diagnostic study and that the hospital records indicate that the services furnished were, either, intensive treatment services, admission and related services necessary for diagnostic study, or equivalent  services.     I certify that the patient continues to need, on a daily basis, active treatment furnished directly by or requiring the supervision of inpatient psychiatric facility personnel.   I estimate 14 days of hospitalization is necessary for proper treatment of the patient. My plans for post-hospital care for this patient are  TBD     Ginger Dubon MD    -     05/31/2022  -     6:44 PM    Total time 15 minutes with > 50%spent on coordination of cares and psycho-education.    This note was created with help of Dragon dictation system. Grammatical / typing errors are not intentional.    Ginger Dubon MD

## 2022-05-31 NOTE — PLAN OF CARE
Problem: Sleep Disturbance (Psychotic Signs/Symptoms)  Goal: Improved Sleep (Psychotic Signs/Symptoms)  Outcome: Ongoing, Progressing   Goal Outcome Evaluation:        Slept uninterrupted for 10 hours  No complaints made   For  BMP today

## 2022-05-31 NOTE — PLAN OF CARE
05/31/22 1306   Individualization/Patient Specific Goals   Patient Personal Strengths resilient   Patient Vulnerabilities housing insecurity;lacks insight into illness   Anxieties, Fears or Concerns Patient is eager to leave the hospital.   Individualized Care Needs Patient is on a commitment/pittman and there is a petition for guardianship.   Patient-Specific Goals (Include Timeframe) Stabilization and completion of guardianship. 10-15 days   Interprofessional Rounds   Summary Patient was discussed in team meeting. Patient continues to stabilize. Medication changes were discussed. The team is waiting for the guardianship to be finalized.   Participants nursing;psychiatrist;[facility]   Team Discussion   Participants Winnie Gil - FIFI, LASHAY Oswald RN   Progress Making progress   Anticipated length of stay 10-15 days   Continued Stay Criteria/Rationale Patient is waiting for guardianship to be processed.   Medical/Physical Please see H and P.   Precautions fall   Plan Continued medication management, psychiatric evaluations, nursing assessments, group therapy, CTC case management, and milieu management. Patient is waiting for guardianship to be processed, [place] can be completed after guardianship is established, and patient will discharge to assisted living.   Safety Plan code 2 and 3, s-15, committed/pittman   Anticipated Discharge Disposition assisted living

## 2022-06-01 ENCOUNTER — DOCUMENTATION ONLY (OUTPATIENT)
Dept: OTHER | Facility: CLINIC | Age: 57
End: 2022-06-01
Payer: COMMERCIAL

## 2022-06-01 PROCEDURE — 124N000003 HC R&B MH SENIOR/ADOLESCENT

## 2022-06-01 PROCEDURE — 250N000013 HC RX MED GY IP 250 OP 250 PS 637: Performed by: PSYCHIATRY & NEUROLOGY

## 2022-06-01 PROCEDURE — 99231 SBSQ HOSP IP/OBS SF/LOW 25: CPT | Performed by: PSYCHIATRY & NEUROLOGY

## 2022-06-01 RX ADMIN — MEMANTINE 10 MG: 10 TABLET ORAL at 08:14

## 2022-06-01 RX ADMIN — CLOZAPINE 225 MG: 100 TABLET ORAL at 20:25

## 2022-06-01 RX ADMIN — GABAPENTIN 100 MG: 100 CAPSULE ORAL at 17:26

## 2022-06-01 RX ADMIN — HALOPERIDOL 2 MG: 1 TABLET ORAL at 20:25

## 2022-06-01 RX ADMIN — LEVOTHYROXINE SODIUM 88 MCG: 0.09 TABLET ORAL at 08:14

## 2022-06-01 RX ADMIN — BUSPIRONE HYDROCHLORIDE 30 MG: 15 TABLET ORAL at 08:14

## 2022-06-01 RX ADMIN — MEMANTINE 10 MG: 10 TABLET ORAL at 20:26

## 2022-06-01 RX ADMIN — ASPIRIN 81 MG: 81 TABLET, COATED ORAL at 08:14

## 2022-06-01 RX ADMIN — BENZTROPINE MESYLATE 0.5 MG: 0.5 TABLET ORAL at 20:25

## 2022-06-01 RX ADMIN — MIRTAZAPINE 15 MG: 15 TABLET, FILM COATED ORAL at 20:25

## 2022-06-01 RX ADMIN — BUSPIRONE HYDROCHLORIDE 30 MG: 15 TABLET ORAL at 20:25

## 2022-06-01 RX ADMIN — SALMETEROL XINAFOATE 1 PUFF: 50 POWDER, METERED ORAL; RESPIRATORY (INHALATION) at 20:32

## 2022-06-01 RX ADMIN — SALMETEROL XINAFOATE 1 PUFF: 50 POWDER, METERED ORAL; RESPIRATORY (INHALATION) at 08:12

## 2022-06-01 RX ADMIN — DONEPEZIL HYDROCHLORIDE 10 MG: 10 TABLET ORAL at 20:26

## 2022-06-01 RX ADMIN — GABAPENTIN 100 MG: 100 CAPSULE ORAL at 14:08

## 2022-06-01 RX ADMIN — MEGESTROL ACETATE 20 MG: 20 TABLET ORAL at 08:14

## 2022-06-01 RX ADMIN — GABAPENTIN 100 MG: 100 CAPSULE ORAL at 08:14

## 2022-06-01 RX ADMIN — Medication 5 MG: at 20:25

## 2022-06-01 RX ADMIN — BENZTROPINE MESYLATE 0.5 MG: 0.5 TABLET ORAL at 08:14

## 2022-06-01 RX ADMIN — NYSTATIN: 100000 CREAM TOPICAL at 20:33

## 2022-06-01 ASSESSMENT — ACTIVITIES OF DAILY LIVING (ADL)
HYGIENE/GROOMING: PROMPTS
LAUNDRY: UNABLE TO COMPLETE
ORAL_HYGIENE: PROMPTS
DRESS: INDEPENDENT
DRESS: INDEPENDENT
HYGIENE/GROOMING: PROMPTS
ORAL_HYGIENE: PROMPTS

## 2022-06-01 NOTE — PLAN OF CARE
Problem: Sleep Disturbance (Psychotic Signs/Symptoms)  Goal: Improved Sleep (Psychotic Signs/Symptoms)  Outcome: Ongoing, Progressing   Goal Outcome Evaluation:           Slept well for 10 hours  No behavioral issues encountered this shift

## 2022-06-01 NOTE — PROGRESS NOTES
"PSYCHIATRY  PROGRESS NOTE     DATE OF SERVICE   06/01/2022       CHIEF COMPLAINT   Patient was admitted due to inability to safely care for himself and psychosis.       SUBJECTIVE   Nursing reports: Affect flat.  Isolates to room except for dinner.  Poor fluid intake approximately 350cc this shift. Calm, med compliant. Denies depression or auditory hallucinations tonight; states they come and go.  P:  Continue same plan of care.      report: Guardianship hearing is scheduled for next week.     OBJECTIVE   Patient was seen and evaluated at bedside with nurse practitioner student present during the assessment, this was a face-to-face evaluation.  Patient presented as more calm and engaged today.  Patient continues to tell me that he is feeling \"all right\".  Patient ask about whether he will be able to leave the hospital and I reviewed with the patient that next week he will have a guardianship hearing.  Patient started yelling that he does not need a guardian but he quickly calmed down.  I informed the patient that at this time the decision of him having a guardian or not it will come from a  and it will not be my decision.  It was unclear how much the patient was able to understand but he said that he did not understood.       MEDICATIONS   Medications:  Scheduled Meds:    aspirin  81 mg Oral Daily     benztropine  0.5 mg Oral BID     busPIRone HCl  30 mg Oral BID     cloZAPine  225 mg Oral At Bedtime     donepezil  10 mg Oral At Bedtime     gabapentin  100 mg Oral TID w/meals     haloperidol  2 mg Oral At Bedtime     levothyroxine  88 mcg Oral Daily     megestrol  20 mg Oral Daily     melatonin  5 mg Oral At Bedtime     memantine  10 mg Oral BID     mirtazapine  15 mg Oral At Bedtime     nystatin   Topical BID     salmeterol  1 puff Inhalation BID     Continuous Infusions:  PRN Meds:.[Held by provider] acetaminophen, albuterol, alum & mag hydroxide-simethicone, benzocaine-menthol, hydrOXYzine, " "nicotine, polyethylene glycol, polyethylene glycol-propylene glycol PF, senna-docusate    Medication adherence issues: MS Med Adherence Y/N: Yes, Hospitalization  Medication side effects: MEDICATION SIDE EFFECTS: no side effects reported  Benefit: Yes / No: Yes       ROS   A comprehensive review of systems was negative.       MENTAL STATUS EXAM   Vitals: /77   Pulse 97   Temp 98.2  F (36.8  C) (Temporal)   Resp 16   Ht 1.778 m (5' 10\")   Wt 77.3 kg (170 lb 8 oz)   SpO2 98%   BMI 24.46 kg/m      Appearance:  No apparent distress  Mood: \"I doing all right.\"  Affect: Stable  Suicidal Ideation: Unable to assess  Homicidal Ideation: Unable to assess  Thought process: concrete  Thought content: Remains confused and delusional.    Fund of Knowledge: Below average  Attention/Concentration: Poor  Language ability: Significantly impaired  Memory: Global memory impairment  Insight:  Poor.  Judgement: Poor  Orientation: Only to person  Psychomotor Behavior: slowed    Muscle Strength and Tone: MuscleStrength: Normal and Atrophy  Gait and Station: Not evaluated       LABS   personally reviewed.     No results found for: PHENYTOIN, PHENOBARB, VALPROATE, CBMZ       DIAGNOSIS   Principal Problem:    Major neurocognitive disorder, due to multiple etiologies, with behavioral disturbance, severe (H)    Active Problem List:  Patient Active Problem List   Diagnosis     TBI with aggressive behavior     HTN (hypertension)     COPD (chronic obstructive pulmonary disease) (H)     Dementia with behavioral disturbance (H)     Chronic schizophrenia (H)     Smoker     Agitation     Behavior disturbance     Psychosis (H)     Major neurocognitive disorder, due to multiple etiologies, with behavioral disturbance, severe (H)     Paranoid schizophrenia, chronic condition with acute exacerbation (H)     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 Ongoing education given regarding diagnostic and treatment options with risks, benefits and alternatives and adequate verbalization of understanding.  2.  Medications       BuSpar 30 mg 2 times daily       decrease Clozaril 225 mg at bedtime       Aricept 10 mg at bedtime       Gabapentin 100 mg 3 times a day       decrease Haldol 2 mg at bedtime       Namenda 10 mg 2 times a day       Remeron 15 mg at bedtime       melatonin 5 mg at bedtime  3.  Medical team following the patient   4.   coordinating a safe discharge plan with the patient.    Risk Assessment: [facility] RISK ASSESSMENT: Patient able to contract for safety    Coordination of Care:   Treatment Plan reviewed and physician signed, Care discussed with Care/Treatment Team Members, Chart reviewed and Patient seen      Re-Certification I certify that the inpatient psychiatric facility services furnished since the previous certification were, and continue to be, medically necessary for, either, treatment which could reasonably be expected to improve the patient s condition or diagnostic study and that the hospital records indicate that the services furnished were, either, intensive treatment services, admission and related services necessary for diagnostic study, or equivalent services.     I certify that the patient continues to need, on a daily basis, active treatment furnished directly by or requiring the supervision of inpatient psychiatric facility personnel.   I estimate 14 days of hospitalization is necessary for proper treatment of the patient. My plans for post-hospital care for this patient are  TBD     Ginger Dubon MD    -     06/01/2022  -     12:57 PM    Total time 15 minutes with > 50%spent on coordination of cares and psycho-education.    This note was created with help of Dragon dictation system. Grammatical / typing errors are not  intentional.    Ginger Dubon MD

## 2022-06-01 NOTE — PLAN OF CARE
Care coordination:  - As requested by the team assisting patient with guardianship, writer left a message with patient's sister (Freida Machuca) asking for her brother's (Shivam Kerr) permanent address. Writer has not received a return call, yet.

## 2022-06-01 NOTE — PLAN OF CARE
"  Problem: Behavior Regulation Impairment (Psychotic Signs/Symptoms)  Goal: Improved Behavioral Control (Psychotic Signs/Symptoms)  Outcome: Ongoing, Not Progressing     Problem: Decreased Participation and Engagement (Psychotic Signs/Symptoms)  Goal: Increased Participation and Engagement (Psychotic Signs/Symptoms)  Outcome: Ongoing, Not Progressing     Problem: Mood Impairment (Psychotic Signs/Symptoms)  Goal: Improved Mood Symptoms (Psychotic Signs/Symptoms)  Outcome: Ongoing, Not Progressing   Goal Outcome Evaluation:    Plan of Care Reviewed With: patient     Patient is calm, affect is flat and blunted. Patient did not answer questions during MH check-in, but does appear depressed. Patient declined breakfast \"stating i'm not hungry\" but did come out for lunch and ate 75% of meal. When walking back from lunch patient looked unsteady, writer had to CGA patient back to room. Patient appears deconditioned breathing heavily from the short walk from dining room back to bed room. Patient was medication compliant except nystatin cream. Did not attend groups, remained isolative except for lunch.  "

## 2022-06-02 PROCEDURE — 250N000013 HC RX MED GY IP 250 OP 250 PS 637: Performed by: PSYCHIATRY & NEUROLOGY

## 2022-06-02 PROCEDURE — 124N000003 HC R&B MH SENIOR/ADOLESCENT

## 2022-06-02 RX ADMIN — MIRTAZAPINE 15 MG: 15 TABLET, FILM COATED ORAL at 21:28

## 2022-06-02 RX ADMIN — CLOZAPINE 225 MG: 100 TABLET ORAL at 21:27

## 2022-06-02 RX ADMIN — BUSPIRONE HYDROCHLORIDE 30 MG: 15 TABLET ORAL at 08:21

## 2022-06-02 RX ADMIN — Medication 5 MG: at 21:28

## 2022-06-02 RX ADMIN — ASPIRIN 81 MG: 81 TABLET, COATED ORAL at 08:22

## 2022-06-02 RX ADMIN — HALOPERIDOL 2 MG: 1 TABLET ORAL at 21:27

## 2022-06-02 RX ADMIN — BENZTROPINE MESYLATE 0.5 MG: 0.5 TABLET ORAL at 21:28

## 2022-06-02 RX ADMIN — GABAPENTIN 100 MG: 100 CAPSULE ORAL at 12:27

## 2022-06-02 RX ADMIN — GABAPENTIN 100 MG: 100 CAPSULE ORAL at 18:06

## 2022-06-02 RX ADMIN — MEGESTROL ACETATE 20 MG: 20 TABLET ORAL at 08:22

## 2022-06-02 RX ADMIN — BUSPIRONE HYDROCHLORIDE 30 MG: 15 TABLET ORAL at 21:27

## 2022-06-02 RX ADMIN — MEMANTINE 10 MG: 10 TABLET ORAL at 21:28

## 2022-06-02 RX ADMIN — SALMETEROL XINAFOATE 1 PUFF: 50 POWDER, METERED ORAL; RESPIRATORY (INHALATION) at 08:20

## 2022-06-02 RX ADMIN — LEVOTHYROXINE SODIUM 88 MCG: 0.09 TABLET ORAL at 08:21

## 2022-06-02 RX ADMIN — SALMETEROL XINAFOATE 1 PUFF: 50 POWDER, METERED ORAL; RESPIRATORY (INHALATION) at 21:28

## 2022-06-02 RX ADMIN — BENZTROPINE MESYLATE 0.5 MG: 0.5 TABLET ORAL at 08:21

## 2022-06-02 RX ADMIN — DONEPEZIL HYDROCHLORIDE 10 MG: 10 TABLET ORAL at 21:29

## 2022-06-02 RX ADMIN — MEMANTINE 10 MG: 10 TABLET ORAL at 08:22

## 2022-06-02 RX ADMIN — GABAPENTIN 100 MG: 100 CAPSULE ORAL at 08:21

## 2022-06-02 ASSESSMENT — ACTIVITIES OF DAILY LIVING (ADL)
HYGIENE/GROOMING: WITH ASSISTANCE
ORAL_HYGIENE: WITH ASSISTANCE
DRESS: WITH ASSISTANCE

## 2022-06-02 NOTE — PLAN OF CARE
Goal Outcome Evaluation:      Patient slept well the whole night for 8 hours. He still is for Covid Test. He has been refusing the said test. Nothing unusual noted.

## 2022-06-02 NOTE — PLAN OF CARE
"  Problem: Behavior Regulation Impairment (Psychotic SigNursing Assessment    Psychosis (H) [F29]    Admit Date: 1/26/2022    Length of Stay: 127    Patient evaluation continues. Assessed mood,anxiety,thoughts and behavior. Patient is  progressing towards goals. Pt is waiting for placement.Patient is encouraged to participate in groups and assisted to develop healthy coping skills.  Patient admits to auditory hallucinations. /67   Pulse 103   Temp 98.6  F (37  C) (Oral)   Resp 16   Ht 1.778 m (5' 10\")   Wt 77.6 kg (171 lb)   SpO2 99%   BMI 24.54 kg/m      Mood:flat    Patient reports depression does not respond to question and reports anxiety no    Affect: flat blunted    Sleep: good    Appetite: good    SI: denies    HI: denies    SIB: denies      Medication Compliance yes    Group participation: none    ADL's: assist    Fall risk interventions: proper foot wear, orthostatic B/p    Rinku Score Interventions:none    Discharge planning in process    Refer to daily team meeting notes for individualized plan of care. Nursing will continue to assess.    *Scale is 1-10 and 10 is the worst.       ns/Symptoms)  Goal: Improved Behavioral Control (Psychotic Signs/Symptoms)  Outcome: Met   Goal Outcome Evaluation:    Plan of Care Reviewed With: patient                 "

## 2022-06-02 NOTE — PLAN OF CARE
"  Problem: Sensory Perception Impairment (Psychotic Signs/Symptoms)  Goal: Decreased Sensory Symptoms (Psychotic Signs/Symptoms)  Outcome: Ongoing, Progressing  Note: Patient was observed to be talking to self during medication administration, and was unable to engage in conversation, or answer questions. During that time, patient is avoiding eye contact, unable to determine if he was having visual hallucinations as well as auditory once. Patient did not engage in conversation during rounding x 3. He was able to state his birthday with delay. Said he was having \"so so day\", and that he stays in his room, because there is nothing to do. Declined to come out of his room for groups or movie. Ate 75% of supper, declined HS snack. Drinks fluid freely. Likes orange juice. As patient did not answer most of asked questions, it was difficult to assess his MH symptoms, orientation, or concerns. Denied pain. Patient did not exhibit behaviors suggesting paranoia. He neglected his hygiene, he appeared unkept and unclean. Declined to follow directions or accept help offered by staff. Isolated in his room, out for dinner only, after staff encouragement. No suicidal behaviors, gestures, or statements.   Patient took medications as scheduled without hesitation. Will continue with plan of care.    Goal Outcome Evaluation:    Plan of Care Reviewed With: patient. He appeared to be unable to participate at this time.                "

## 2022-06-02 NOTE — PROGRESS NOTES
Brief Medicine Note:    Medicine following up on abnormal labs. Mild hypernatremia on 5/30 with Na 148. Na normalized to 144 5/31. See several prior medicine notes since 2/2022 and onward for further medical details. Medicine will sign off at this time. Please contact with future questions or concerns    Twyla Kumar PA-C  Internal Medicine [facility]  [phone]

## 2022-06-03 LAB
BASOPHILS # BLD AUTO: 0.1 10E3/UL (ref 0–0.2)
BASOPHILS NFR BLD AUTO: 1 %
EOSINOPHIL # BLD AUTO: 1.6 10E3/UL (ref 0–0.7)
EOSINOPHIL NFR BLD AUTO: 23 %
ERYTHROCYTE [DISTWIDTH] IN BLOOD BY AUTOMATED COUNT: 13.4 % (ref 10–15)
HCT VFR BLD AUTO: 43.5 % (ref 40–53)
HGB BLD-MCNC: 15.4 G/DL (ref 13.3–17.7)
IMM GRANULOCYTES # BLD: 0 10E3/UL
IMM GRANULOCYTES NFR BLD: 0 %
LYMPHOCYTES # BLD AUTO: 1.7 10E3/UL (ref 0.8–5.3)
LYMPHOCYTES NFR BLD AUTO: 25 %
MCH RBC QN AUTO: 32 PG (ref 26.5–33)
MCHC RBC AUTO-ENTMCNC: 35.4 G/DL (ref 31.5–36.5)
MCV RBC AUTO: 90 FL (ref 78–100)
MONOCYTES # BLD AUTO: 0.6 10E3/UL (ref 0–1.3)
MONOCYTES NFR BLD AUTO: 8 %
NEUTROPHILS # BLD AUTO: 2.9 10E3/UL (ref 1.6–8.3)
NEUTROPHILS NFR BLD AUTO: 43 %
NRBC # BLD AUTO: 0 10E3/UL
NRBC BLD AUTO-RTO: 0 /100
PLATELET # BLD AUTO: 174 10E3/UL (ref 150–450)
RBC # BLD AUTO: 4.82 10E6/UL (ref 4.4–5.9)
SARS-COV-2 RNA RESP QL NAA+PROBE: NEGATIVE
WBC # BLD AUTO: 7 10E3/UL (ref 4–11)

## 2022-06-03 PROCEDURE — 124N000003 HC R&B MH SENIOR/ADOLESCENT

## 2022-06-03 PROCEDURE — 99231 SBSQ HOSP IP/OBS SF/LOW 25: CPT | Performed by: PSYCHIATRY & NEUROLOGY

## 2022-06-03 PROCEDURE — 85025 COMPLETE CBC W/AUTO DIFF WBC: CPT | Performed by: PSYCHIATRY & NEUROLOGY

## 2022-06-03 PROCEDURE — 250N000013 HC RX MED GY IP 250 OP 250 PS 637: Performed by: PSYCHIATRY & NEUROLOGY

## 2022-06-03 PROCEDURE — 87635 SARS-COV-2 COVID-19 AMP PRB: CPT | Performed by: PSYCHIATRY & NEUROLOGY

## 2022-06-03 PROCEDURE — 36415 COLL VENOUS BLD VENIPUNCTURE: CPT | Performed by: PSYCHIATRY & NEUROLOGY

## 2022-06-03 RX ADMIN — MIRTAZAPINE 15 MG: 15 TABLET, FILM COATED ORAL at 19:53

## 2022-06-03 RX ADMIN — BENZTROPINE MESYLATE 0.5 MG: 0.5 TABLET ORAL at 08:18

## 2022-06-03 RX ADMIN — BUSPIRONE HYDROCHLORIDE 30 MG: 15 TABLET ORAL at 08:18

## 2022-06-03 RX ADMIN — GABAPENTIN 100 MG: 100 CAPSULE ORAL at 14:20

## 2022-06-03 RX ADMIN — MEMANTINE 10 MG: 10 TABLET ORAL at 19:53

## 2022-06-03 RX ADMIN — CLOZAPINE 225 MG: 100 TABLET ORAL at 19:53

## 2022-06-03 RX ADMIN — GABAPENTIN 100 MG: 100 CAPSULE ORAL at 17:14

## 2022-06-03 RX ADMIN — MEGESTROL ACETATE 20 MG: 20 TABLET ORAL at 08:18

## 2022-06-03 RX ADMIN — LEVOTHYROXINE SODIUM 88 MCG: 0.09 TABLET ORAL at 08:18

## 2022-06-03 RX ADMIN — BUSPIRONE HYDROCHLORIDE 30 MG: 15 TABLET ORAL at 19:53

## 2022-06-03 RX ADMIN — DONEPEZIL HYDROCHLORIDE 10 MG: 10 TABLET ORAL at 19:54

## 2022-06-03 RX ADMIN — HALOPERIDOL 2 MG: 1 TABLET ORAL at 19:53

## 2022-06-03 RX ADMIN — GABAPENTIN 100 MG: 100 CAPSULE ORAL at 08:18

## 2022-06-03 RX ADMIN — SALMETEROL XINAFOATE 1 PUFF: 50 POWDER, METERED ORAL; RESPIRATORY (INHALATION) at 08:19

## 2022-06-03 RX ADMIN — ASPIRIN 81 MG: 81 TABLET, COATED ORAL at 08:18

## 2022-06-03 RX ADMIN — SALMETEROL XINAFOATE 1 PUFF: 50 POWDER, METERED ORAL; RESPIRATORY (INHALATION) at 19:54

## 2022-06-03 RX ADMIN — BENZTROPINE MESYLATE 0.5 MG: 0.5 TABLET ORAL at 19:53

## 2022-06-03 RX ADMIN — Medication 5 MG: at 19:53

## 2022-06-03 RX ADMIN — MEMANTINE 10 MG: 10 TABLET ORAL at 08:18

## 2022-06-03 ASSESSMENT — ACTIVITIES OF DAILY LIVING (ADL)
HYGIENE/GROOMING: WITH ASSISTANCE
ORAL_HYGIENE: PROMPTS
DRESS: WITH ASSISTANCE
LAUNDRY: UNABLE TO COMPLETE

## 2022-06-03 NOTE — PLAN OF CARE
"Care coordination:  - Writer called patient's sister (Freida Machuca) again. Was able to speak with her briefly. She stated the only brother she has is John. She has no other brother. She also said, \"They have my address, already.\"   - Writer informed intake to update patient's contacts list.       "

## 2022-06-03 NOTE — PLAN OF CARE
Goal Outcome Evaluation:       Patient slept comfortably the whole night for 8.5 hours without interruptions. Nothing unusual noted. No complaints made.

## 2022-06-03 NOTE — PLAN OF CARE
"Nursing Assessment    Psychosis (H) [F29]    Admit Date: 1/26/2022    Length of Stay: 128    Patient evaluation continues. Assessed mood,anxiety,thoughts and behavior. Patient is progressing towards goals. Patient is encouraged to participate in groups and assisted to develop healthy coping skills.  Patient admits to auditory  hallucinations. /70   Pulse 89   Temp 97.8  F (36.6  C) (Oral)   Resp 16   Ht 1.778 m (5' 10\")   Wt 77.6 kg (171 lb)   SpO2 96%   BMI 24.54 kg/m      Mood: ok    Patient reports depression none and reports anxiety \"a little\"    Affect: less flat smiles upon approach    Sleep: good    Appetite: good    SI: denies    HI: denies    SIB: denies      Medication Compliance yes    Group participation: no    ADL's: assist and prompt    Fall risk interventions: proper foot wear, orthostatic B/p    Rinku Score Interventions:none    Discharge planning in process    Refer to daily team meeting notes for individualized plan of care. Nursing will continue to assess.    *Scale is 1-10 and 10 is the worst.         Problem: Behavior Regulation Impairment (Psychotic Signs/Symptoms)  Goal: Improved Behavioral Control (Psychotic Signs/Symptoms)  Outcome: Met   Goal Outcome Evaluation:    Plan of Care Reviewed With: patient                 "

## 2022-06-03 NOTE — PLAN OF CARE
"  Problem: Behavior Regulation Impairment (Psychotic Signs/Symptoms)  Goal: Improved Behavioral Control (Psychotic Signs/Symptoms)  6/2/2022 2150 by Wen Broderick, RN  Outcome: Met  6/2/2022 1628 by Wen Brodercik, RN  Outcome: Met   Goal Outcome Evaluation:    Plan of Care Reviewed With: patient      Nursing Assessment    Psychosis (H) [F29]    Admit Date: 1/26/2022    Length of Stay: 127    Patient evaluation continues. Assessed mood,anxiety,thoughts and behavior. Patient is progressing towards goals. Patient is encouraged to participate in groups and assisted to develop healthy coping skills.  Patient admits to auditory hallucinations. /67   Pulse 103   Temp 98.6  F (37  C) (Oral)   Resp 16   Ht 1.778 m (5' 10\")   Wt 77.6 kg (171 lb)   SpO2 99%   BMI 24.54 kg/m      Pt was out in the lounge and and ate a good dinner. Pt was smiling and thankful of meal served. Pt was answering some questions and talking to this writer at . Pt stated that he was laying on his back too long and giggled. Affect appears less confused. Pt states he feels less tired.     Mood: good,smiling    Patient reports depression unable to report and reports anxiety unable to report    Affect:brighter    Sleep: good    Appetite: good    SI: denies    HI: denies    SIB: denies      Medication Compliance yes    Group participation:no    ADL's: encouraged but refuses except for bed bath    Fall risk interventions: proper foot wear, orthostatic B/p    Rinku Score Interventions:none    Discharge planning in process    Refer to daily team meeting notes for individualized plan of care. Nursing will continue to assess.    *Scale is 1-10 and 10 is the worst.                  "

## 2022-06-03 NOTE — PROGRESS NOTES
"PSYCHIATRY  PROGRESS NOTE     DATE OF SERVICE   06/03/2022       CHIEF COMPLAINT   Patient was admitted due to inability to safely care for himself and psychosis.       SUBJECTIVE   Nursing reports: Patient evaluation continues. Assessed mood,anxiety,thoughts and behavior. Patient is progressing towards goals. Patient is encouraged to participate in groups and assisted to develop healthy coping skills.  Patient admits to auditory  hallucinations. /70   Pulse 89   Temp 97.8  F (36.6  C) (Oral)   Resp 16   Ht 1.778 m (5' 10\")   Wt 77.6 kg (171 lb)   SpO2 96%   BMI 24.54 kg/m        report: Guardianship hearing is scheduled for next week.     OBJECTIVE   Patient was seen and evaluated at bedside with nurse practitioner student present during the assessment, this was a face-to-face evaluation.  Patient reported that he is doing well and initially denied all psychiatric symptoms.  Upon further questioning patient admitted that he has been hearing voices but refused to talk about these voices.  During my assessment the patient presented as withdrawn and internally preoccupied.  He was also refusing to eat and said that he has no appetite.       MEDICATIONS   Medications:  Scheduled Meds:    aspirin  81 mg Oral Daily     benztropine  0.5 mg Oral BID     busPIRone HCl  30 mg Oral BID     cloZAPine  225 mg Oral At Bedtime     donepezil  10 mg Oral At Bedtime     gabapentin  100 mg Oral TID w/meals     haloperidol  2 mg Oral At Bedtime     levothyroxine  88 mcg Oral Daily     megestrol  20 mg Oral Daily     melatonin  5 mg Oral At Bedtime     memantine  10 mg Oral BID     mirtazapine  15 mg Oral At Bedtime     nystatin   Topical BID     salmeterol  1 puff Inhalation BID     Continuous Infusions:  PRN Meds:.[Held by provider] acetaminophen, albuterol, alum & mag hydroxide-simethicone, benzocaine-menthol, hydrOXYzine, nicotine, polyethylene glycol, polyethylene glycol-propylene glycol PF, " "senna-docusate    Medication adherence issues: MS Med Adherence Y/N: Yes, Hospitalization  Medication side effects: MEDICATION SIDE EFFECTS: no side effects reported  Benefit: Yes / No: Yes       ROS   A comprehensive review of systems was negative.       MENTAL STATUS EXAM   Vitals: /84   Pulse 105   Temp 97.8  F (36.6  C) (Oral)   Resp 16   Ht 1.778 m (5' 10\")   Wt 77.6 kg (171 lb)   SpO2 99%   BMI 24.54 kg/m      Appearance:  No apparent distress  Mood: \"I doing all right.\"  Affect: Restricted  Suicidal Ideation: Unable to assess, patient refused to answer  Homicidal Ideation: Unable to assess, patient refused to answer  Thought process: concrete  Thought content: Remains confused and delusional.    Fund of Knowledge: Below average  Attention/Concentration: Poor  Language ability: Significantly impaired  Memory: Global memory impairment  Insight:  Poor.  Judgement: Poor  Orientation: Only to person  Psychomotor Behavior: slowed    Muscle Strength and Tone: MuscleStrength: Normal and Atrophy  Gait and Station: Not evaluated       LABS   personally reviewed.     No results found for: PHENYTOIN, PHENOBARB, VALPROATE, CBMZ       DIAGNOSIS   Principal Problem:    Major neurocognitive disorder, due to multiple etiologies, with behavioral disturbance, severe (H)    Active Problem List:  Patient Active Problem List   Diagnosis     TBI with aggressive behavior     HTN (hypertension)     COPD (chronic obstructive pulmonary disease) (H)     Dementia with behavioral disturbance (H)     Chronic schizophrenia (H)     Smoker     Agitation     Behavior disturbance     Psychosis (H)     Major neurocognitive disorder, due to multiple etiologies, with behavioral disturbance, severe (H)     Paranoid schizophrenia, chronic condition with acute exacerbation (H)     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 Ongoing education given regarding diagnostic and treatment options with risks, benefits and alternatives and adequate verbalization of understanding.  2.  Medications       BuSpar 30 mg 2 times daily       decrease Clozaril 225 mg at bedtime       Aricept 10 mg at bedtime       Gabapentin 100 mg 3 times a day       decrease Haldol 2 mg at bedtime       Namenda 10 mg 2 times a day       Remeron 15 mg at bedtime       melatonin 5 mg at bedtime  3.  Medical team following the patient   4.   coordinating a safe discharge plan with the patient.    Risk Assessment: [facility] RISK ASSESSMENT: Patient able to contract for safety    Coordination of Care:   Treatment Plan reviewed and physician signed, Care discussed with Care/Treatment Team Members, Chart reviewed and Patient seen      Re-Certification I certify that the inpatient psychiatric facility services furnished since the previous certification were, and continue to be, medically necessary for, either, treatment which could reasonably be expected to improve the patient s condition or diagnostic study and that the hospital records indicate that the services furnished were, either, intensive treatment services, admission and related services necessary for diagnostic study, or equivalent services.     I certify that the patient continues to need, on a daily basis, active treatment furnished directly by or requiring the supervision of inpatient psychiatric facility personnel.   I estimate 14 days of hospitalization is necessary for proper treatment of the patient. My plans for post-hospital care for this patient are  TBD     Ginger Dubon MD    -     06/03/2022  -     3:26 PM    Total time 15 minutes with > 50%spent on coordination of cares and psycho-education.    This note was created with help of Dragon dictation system. Grammatical / typing errors are not  intentional.    Ginger Dubon MD

## 2022-06-04 PROCEDURE — 250N000013 HC RX MED GY IP 250 OP 250 PS 637: Performed by: PSYCHIATRY & NEUROLOGY

## 2022-06-04 PROCEDURE — 124N000003 HC R&B MH SENIOR/ADOLESCENT

## 2022-06-04 RX ADMIN — MIRTAZAPINE 15 MG: 15 TABLET, FILM COATED ORAL at 20:05

## 2022-06-04 RX ADMIN — SALMETEROL XINAFOATE 1 PUFF: 50 POWDER, METERED ORAL; RESPIRATORY (INHALATION) at 20:04

## 2022-06-04 RX ADMIN — MEGESTROL ACETATE 20 MG: 20 TABLET ORAL at 08:45

## 2022-06-04 RX ADMIN — BENZTROPINE MESYLATE 0.5 MG: 0.5 TABLET ORAL at 20:05

## 2022-06-04 RX ADMIN — CLOZAPINE 225 MG: 100 TABLET ORAL at 20:04

## 2022-06-04 RX ADMIN — GABAPENTIN 100 MG: 100 CAPSULE ORAL at 08:45

## 2022-06-04 RX ADMIN — BENZTROPINE MESYLATE 0.5 MG: 0.5 TABLET ORAL at 08:45

## 2022-06-04 RX ADMIN — BUSPIRONE HYDROCHLORIDE 30 MG: 15 TABLET ORAL at 20:05

## 2022-06-04 RX ADMIN — BUSPIRONE HYDROCHLORIDE 30 MG: 15 TABLET ORAL at 08:45

## 2022-06-04 RX ADMIN — DONEPEZIL HYDROCHLORIDE 10 MG: 10 TABLET ORAL at 20:05

## 2022-06-04 RX ADMIN — MEMANTINE 10 MG: 10 TABLET ORAL at 20:04

## 2022-06-04 RX ADMIN — GABAPENTIN 100 MG: 100 CAPSULE ORAL at 17:29

## 2022-06-04 RX ADMIN — MEMANTINE 10 MG: 10 TABLET ORAL at 08:45

## 2022-06-04 RX ADMIN — SALMETEROL XINAFOATE 1 PUFF: 50 POWDER, METERED ORAL; RESPIRATORY (INHALATION) at 08:45

## 2022-06-04 RX ADMIN — GABAPENTIN 100 MG: 100 CAPSULE ORAL at 12:28

## 2022-06-04 RX ADMIN — LEVOTHYROXINE SODIUM 88 MCG: 0.09 TABLET ORAL at 08:45

## 2022-06-04 RX ADMIN — HALOPERIDOL 2 MG: 1 TABLET ORAL at 20:05

## 2022-06-04 RX ADMIN — Medication 5 MG: at 20:05

## 2022-06-04 RX ADMIN — ASPIRIN 81 MG: 81 TABLET, COATED ORAL at 08:45

## 2022-06-04 ASSESSMENT — ACTIVITIES OF DAILY LIVING (ADL)
HYGIENE/GROOMING: WITH ASSISTANCE
ORAL_HYGIENE: PROMPTS
LAUNDRY: UNABLE TO COMPLETE
DRESS: PROMPTS;WITH ASSISTANCE

## 2022-06-04 NOTE — PLAN OF CARE
Problem: Sleep Disturbance (Psychotic Signs/Symptoms)  Goal: Improved Sleep (Psychotic Signs/Symptoms)  Outcome: Ongoing, Progressing   Goal Outcome Evaluation:             Received sleeping comfortably in bed with unlabored breathing.  Independently turns to sides.  Voiced no concerns  Slept for 8.25 hours

## 2022-06-04 NOTE — PLAN OF CARE
"  Problem: Behavior Regulation Impairment (Psychotic Signs/Symptoms)  Goal: Improved Behavioral Control (Psychotic Signs/Symptoms)  Outcome: Ongoing, Progressing   Goal Outcome Evaluation:    Plan of Care Reviewed With: patient     Patient remains isolative and withdrawn. His mood is calm mwith a flat affect. He is minimally responsive to verbal stimulation. He denied SI/SIB/HIB nor hallucinations. Denied depression and anxiety. He just wanted to rest and take a nap. He went to the group activity once this shift. He took 50% of his dinner and had 2 cups of decaffeinated coffee. Patient initially refused to have Covid Test. Explanation done regarding its purpose and importance but to no avail. He said \"I am alright. Nothing is wrong with me\". He is med compliant. No side effects of meds reported. After taking his HS snacks, patient submitted himself for covid test with negative result.                "

## 2022-06-04 NOTE — PLAN OF CARE
Problem: Behavior Regulation Impairment (Psychotic Signs/Symptoms)  Goal: Improved Behavioral Control (Psychotic Signs/Symptoms)  Outcome: Ongoing, Not Progressing     Problem: Cognitive Impairment (Psychotic Signs/Symptoms)  Goal: Optimal Cognitive Function (Psychotic Signs/Symptoms)  Outcome: Ongoing, Not Progressing  Intervention: Support and Promote Cognitive Ability  Recent Flowsheet Documentation  Taken 6/4/2022 1100 by Juarez Murphy RN  Trust Relationship/Rapport:    care explained    questions encouraged    thoughts/feelings acknowledged     Problem: Decreased Participation and Engagement (Psychotic Signs/Symptoms)  Goal: Increased Participation and Engagement (Psychotic Signs/Symptoms)  Outcome: Ongoing, Not Progressing   Goal Outcome Evaluation:    Plan of Care Reviewed With: patient     Patient is calm, affect flat and blunted. Patient denies anxiety, depression, SI, SIB, and hallucinations. Remained isolative and withdrawn only coming out of room for meals. Patient is encouraged to continue to walk for exercise as patient is deconditioned, no heavy breathing noted this shift with ambulation. Not social with peers and did not attend groups.     PM:  Patient is calm, affect flat and blunted, came out for dinner, otherwise spent time in room. Minimally engaged with MH check-in. Declined nystatin cream at bedtime.

## 2022-06-05 PROCEDURE — 250N000013 HC RX MED GY IP 250 OP 250 PS 637: Performed by: PSYCHIATRY & NEUROLOGY

## 2022-06-05 PROCEDURE — 124N000003 HC R&B MH SENIOR/ADOLESCENT

## 2022-06-05 RX ADMIN — DONEPEZIL HYDROCHLORIDE 10 MG: 10 TABLET ORAL at 20:21

## 2022-06-05 RX ADMIN — MEGESTROL ACETATE 20 MG: 20 TABLET ORAL at 08:01

## 2022-06-05 RX ADMIN — GABAPENTIN 100 MG: 100 CAPSULE ORAL at 12:23

## 2022-06-05 RX ADMIN — BUSPIRONE HYDROCHLORIDE 30 MG: 15 TABLET ORAL at 08:01

## 2022-06-05 RX ADMIN — GABAPENTIN 100 MG: 100 CAPSULE ORAL at 08:01

## 2022-06-05 RX ADMIN — Medication 5 MG: at 20:21

## 2022-06-05 RX ADMIN — ASPIRIN 81 MG: 81 TABLET, COATED ORAL at 08:01

## 2022-06-05 RX ADMIN — MEMANTINE 10 MG: 10 TABLET ORAL at 20:21

## 2022-06-05 RX ADMIN — HALOPERIDOL 2 MG: 1 TABLET ORAL at 20:21

## 2022-06-05 RX ADMIN — MEMANTINE 10 MG: 10 TABLET ORAL at 08:01

## 2022-06-05 RX ADMIN — BUSPIRONE HYDROCHLORIDE 30 MG: 15 TABLET ORAL at 20:21

## 2022-06-05 RX ADMIN — MIRTAZAPINE 15 MG: 15 TABLET, FILM COATED ORAL at 20:21

## 2022-06-05 RX ADMIN — LEVOTHYROXINE SODIUM 88 MCG: 0.09 TABLET ORAL at 08:01

## 2022-06-05 RX ADMIN — CLOZAPINE 225 MG: 100 TABLET ORAL at 20:20

## 2022-06-05 RX ADMIN — SALMETEROL XINAFOATE 1 PUFF: 50 POWDER, METERED ORAL; RESPIRATORY (INHALATION) at 20:22

## 2022-06-05 RX ADMIN — BENZTROPINE MESYLATE 0.5 MG: 0.5 TABLET ORAL at 08:01

## 2022-06-05 RX ADMIN — SALMETEROL XINAFOATE 1 PUFF: 50 POWDER, METERED ORAL; RESPIRATORY (INHALATION) at 08:01

## 2022-06-05 RX ADMIN — BENZTROPINE MESYLATE 0.5 MG: 0.5 TABLET ORAL at 20:21

## 2022-06-05 RX ADMIN — GABAPENTIN 100 MG: 100 CAPSULE ORAL at 17:33

## 2022-06-05 ASSESSMENT — ACTIVITIES OF DAILY LIVING (ADL)
LAUNDRY: UNABLE TO COMPLETE
HYGIENE/GROOMING: WITH ASSISTANCE
ORAL_HYGIENE: PROMPTS
DRESS: PROMPTS;WITH ASSISTANCE

## 2022-06-05 NOTE — PLAN OF CARE
Problem: Sleep Disturbance (Psychotic Signs/Symptoms)  Goal: Improved Sleep (Psychotic Signs/Symptoms)  Outcome: Ongoing, Progressing   Goal Outcome Evaluation:             Received asleep with unlabored breathing.  No concerns raised this shift  Slept for 10.5 hours .

## 2022-06-05 NOTE — PLAN OF CARE
Problem: Behavior Regulation Impairment (Psychotic Signs/Symptoms)  Goal: Improved Behavioral Control (Psychotic Signs/Symptoms)  Outcome: Ongoing, Not Progressing     Problem: Mood Impairment (Psychotic Signs/Symptoms)  Goal: Improved Mood Symptoms (Psychotic Signs/Symptoms)  Outcome: Ongoing, Not Progressing   Goal Outcome Evaluation:    Plan of Care Reviewed With: patient     Patient is calm, affect flat and blunted. Patient did not attend groups, came out for meals. Declined offer to shower or change scrubs. No new concerns.

## 2022-06-06 PROCEDURE — 124N000003 HC R&B MH SENIOR/ADOLESCENT

## 2022-06-06 PROCEDURE — 250N000013 HC RX MED GY IP 250 OP 250 PS 637: Performed by: PSYCHIATRY & NEUROLOGY

## 2022-06-06 PROCEDURE — 99231 SBSQ HOSP IP/OBS SF/LOW 25: CPT | Performed by: PSYCHIATRY & NEUROLOGY

## 2022-06-06 RX ADMIN — MEMANTINE 10 MG: 10 TABLET ORAL at 20:09

## 2022-06-06 RX ADMIN — BENZTROPINE MESYLATE 0.5 MG: 0.5 TABLET ORAL at 08:04

## 2022-06-06 RX ADMIN — HALOPERIDOL 2 MG: 1 TABLET ORAL at 20:09

## 2022-06-06 RX ADMIN — BUSPIRONE HYDROCHLORIDE 30 MG: 15 TABLET ORAL at 20:08

## 2022-06-06 RX ADMIN — Medication 5 MG: at 20:09

## 2022-06-06 RX ADMIN — BUSPIRONE HYDROCHLORIDE 30 MG: 15 TABLET ORAL at 08:05

## 2022-06-06 RX ADMIN — DONEPEZIL HYDROCHLORIDE 10 MG: 10 TABLET ORAL at 20:09

## 2022-06-06 RX ADMIN — GABAPENTIN 100 MG: 100 CAPSULE ORAL at 08:05

## 2022-06-06 RX ADMIN — GABAPENTIN 100 MG: 100 CAPSULE ORAL at 12:34

## 2022-06-06 RX ADMIN — LEVOTHYROXINE SODIUM 88 MCG: 0.09 TABLET ORAL at 08:04

## 2022-06-06 RX ADMIN — ASPIRIN 81 MG: 81 TABLET, COATED ORAL at 08:04

## 2022-06-06 RX ADMIN — MEMANTINE 10 MG: 10 TABLET ORAL at 08:04

## 2022-06-06 RX ADMIN — MIRTAZAPINE 15 MG: 15 TABLET, FILM COATED ORAL at 20:08

## 2022-06-06 RX ADMIN — MEGESTROL ACETATE 20 MG: 20 TABLET ORAL at 08:04

## 2022-06-06 RX ADMIN — BENZTROPINE MESYLATE 0.5 MG: 0.5 TABLET ORAL at 20:09

## 2022-06-06 RX ADMIN — CLOZAPINE 225 MG: 100 TABLET ORAL at 20:09

## 2022-06-06 RX ADMIN — SALMETEROL XINAFOATE 1 PUFF: 50 POWDER, METERED ORAL; RESPIRATORY (INHALATION) at 08:05

## 2022-06-06 RX ADMIN — SALMETEROL XINAFOATE 1 PUFF: 50 POWDER, METERED ORAL; RESPIRATORY (INHALATION) at 20:09

## 2022-06-06 RX ADMIN — GABAPENTIN 100 MG: 100 CAPSULE ORAL at 17:14

## 2022-06-06 ASSESSMENT — ACTIVITIES OF DAILY LIVING (ADL)
HYGIENE/GROOMING: PROMPTS
HYGIENE/GROOMING: PROMPTS
ORAL_HYGIENE: PROMPTS
LAUNDRY: UNABLE TO COMPLETE
DRESS: SCRUBS (BEHAVIORAL HEALTH);INDEPENDENT
ORAL_HYGIENE: PROMPTS
DRESS: PROMPTS;WITH ASSISTANCE
LAUNDRY: UNABLE TO COMPLETE

## 2022-06-06 NOTE — PLAN OF CARE
Goal Outcome Evaluation:    Plan of Care Reviewed With: patient     Patient withdrawn and isolative in his room. Came out for meal. His affect is flat/blunted, mood is calm. Denies MH symptom. He is medication complaint. Not attending group. Not social with peers and staff. VSS. Denies pain. Continue to monitor pt. as plan of care.

## 2022-06-06 NOTE — PLAN OF CARE
"Goal Outcome Evaluation:    Plan of Care Reviewed With: patient     Behavioral  Pt spent majority of this shift sleeping on in his bed. He did come out for dinner and night snacks. Pt mood remains tensed and and depressed and presents with flat blunted affect. Pt got verbally aggressive at HS while in the dining area when writer approached him for night medications, \"you fucker, I'm going to bed\" writer deescalated the situation by walking away and reproached the pt later in his room and he took his medications with no incident. Pt denies all mental health symptoms by presents as depressed and labile. He denies SI and reports \"maybe\" when asked about auditory hallucinations. He ate about 30% of his dinner, came out for HS snacks. No concerns noted.  Medical Alerts  None  Plan  Continue to monitor              "

## 2022-06-06 NOTE — PROGRESS NOTES
PSYCHIATRY  PROGRESS NOTE     DATE OF SERVICE   06/06/2022       CHIEF COMPLAINT   Patient was admitted due to inability to safely care for himself and psychosis.       SUBJECTIVE   Nursing reports: Patient withdrawn and isolative in his room. Came out for meal. His affect is flat/blunted, mood is calm. Denies MH symptom. He is medication complaint. Not attending group. Not social with peers and staff. VSS. Denies pain. Continue to monitor pt. as plan of care.      report: Guardianship hearing continues to be scheduled for June 9.     OBJECTIVE   Patient was seen and evaluated at bedside by himself, this was a face-to-face evaluation.  Patient was observed eating his breakfast and he seemed to be having a good appetite.  He is denying all psychiatric symptoms.  Continues to be internally preoccupied, withdrawn and confused.  Overall today he presented as pleasant but minimally cooperative with the assessment.       MEDICATIONS   Medications:  Scheduled Meds:    aspirin  81 mg Oral Daily     benztropine  0.5 mg Oral BID     busPIRone HCl  30 mg Oral BID     cloZAPine  225 mg Oral At Bedtime     donepezil  10 mg Oral At Bedtime     gabapentin  100 mg Oral TID w/meals     haloperidol  2 mg Oral At Bedtime     levothyroxine  88 mcg Oral Daily     megestrol  20 mg Oral Daily     melatonin  5 mg Oral At Bedtime     memantine  10 mg Oral BID     mirtazapine  15 mg Oral At Bedtime     nystatin   Topical BID     salmeterol  1 puff Inhalation BID     Continuous Infusions:  PRN Meds:.[Held by provider] acetaminophen, albuterol, alum & mag hydroxide-simethicone, benzocaine-menthol, hydrOXYzine, nicotine, polyethylene glycol, polyethylene glycol-propylene glycol PF, senna-docusate    Medication adherence issues: MS Med Adherence Y/N: Yes, Hospitalization  Medication side effects: MEDICATION SIDE EFFECTS: no side effects reported  Benefit: Yes / No: Yes       ROS   A comprehensive review of systems was negative.    "    MENTAL STATUS EXAM   Vitals: /71   Pulse 110   Temp 97.1  F (36.2  C) (Temporal)   Resp 16   Ht 1.778 m (5' 10\")   Wt 77.6 kg (171 lb)   SpO2 99%   BMI 24.54 kg/m      Same as last visit  Appearance:  No apparent distress  Mood: \"I doing all right.\"  Affect: Restricted  Suicidal Ideation: Unable to assess, patient refused to answer  Homicidal Ideation: Unable to assess, patient refused to answer  Thought process: concrete  Thought content: Remains confused and delusional.    Fund of Knowledge: Below average  Attention/Concentration: Poor  Language ability: Significantly impaired  Memory: Global memory impairment  Insight:  Poor.  Judgement: Poor  Orientation: Only to person  Psychomotor Behavior: slowed    Muscle Strength and Tone: MuscleStrength: Normal and Atrophy  Gait and Station: Not evaluated       LABS   personally reviewed.     No results found for: PHENYTOIN, PHENOBARB, VALPROATE, CBMZ       DIAGNOSIS   Principal Problem:    Major neurocognitive disorder, due to multiple etiologies, with behavioral disturbance, severe (H)    Active Problem List:  Patient Active Problem List   Diagnosis     TBI with aggressive behavior     HTN (hypertension)     COPD (chronic obstructive pulmonary disease) (H)     Dementia with behavioral disturbance (H)     Chronic schizophrenia (H)     Smoker     Agitation     Behavior disturbance     Psychosis (H)     Major neurocognitive disorder, due to multiple etiologies, with behavioral disturbance, severe (H)     Paranoid schizophrenia, chronic condition with acute exacerbation (H)     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 Ongoing education given regarding diagnostic and treatment options with risks, benefits and alternatives and adequate verbalization of understanding.  2.  Medications      "  BuSpar 30 mg 2 times daily       Clozaril 225 mg at bedtime       Aricept 10 mg at bedtime       Gabapentin 100 mg 3 times a day       decrease Haldol 2 mg at bedtime       Namenda 10 mg 2 times a day       Remeron 15 mg at bedtime       melatonin 5 mg at bedtime  3.  Medical team following the patient   4.   coordinating a safe discharge plan with the patient.    Risk Assessment: [facility] RISK ASSESSMENT: Patient able to contract for safety    Coordination of Care:   Treatment Plan reviewed and physician signed, Care discussed with Care/Treatment Team Members, Chart reviewed and Patient seen      Re-Certification I certify that the inpatient psychiatric facility services furnished since the previous certification were, and continue to be, medically necessary for, either, treatment which could reasonably be expected to improve the patient s condition or diagnostic study and that the hospital records indicate that the services furnished were, either, intensive treatment services, admission and related services necessary for diagnostic study, or equivalent services.     I certify that the patient continues to need, on a daily basis, active treatment furnished directly by or requiring the supervision of inpatient psychiatric facility personnel.   I estimate 14 days of hospitalization is necessary for proper treatment of the patient. My plans for post-hospital care for this patient are  TBD     Ginger Dubon MD    -     06/06/2022  -     3:01 PM    Total time 15 minutes with > 50%spent on coordination of cares and psycho-education.    This note was created with help of Dragon dictation system. Grammatical / typing errors are not intentional.    Ginger Dubon MD

## 2022-06-06 NOTE — PLAN OF CARE
06/06/22 1519   Individualization/Patient Specific Goals   Patient Personal Strengths resilient   Patient Vulnerabilities lacks insight into illness   Anxieties, Fears or Concerns Patient would like to leave.   Individualized Care Needs Patient is waiting for guardianship, and has recommitment/pittman petition.   Patient-Specific Goals (Include Timeframe) 10-15 days continued stabilization.   Interprofessional Rounds   Summary Patient is unable to discharge to assisted living, until guardianship is finalized. Patient continues to be compliant with treatment plan.   Participants nursing;psychiatrist;[facility]   Team Discussion   Participants Dr. Root, FIFI - Winnie, RN - Ashley   Progress Making progress   Anticipated length of stay 10-15 days   Continued Stay Criteria/Rationale Patient can not care for himself, is a vulnerable adult, is in the process of guardianship and recommitment/pittman.   Medical/Physical Please see H and P.   Precautions fall   Plan Patient will receive psychiatric evaluations, nursing assessments, group therapy, milieu therapy, and [facility] case management. Patient will discharge to [facility] after his guardianship is approved.   Safety Plan code 2, 3, s-15   Anticipated Discharge Disposition assisted living

## 2022-06-06 NOTE — PLAN OF CARE
Goal Outcome Evaluation:      Patient sept the whole night for 10 hours. No complaints made. His sleep was uninterrupted. No concerns raised.

## 2022-06-07 PROCEDURE — 124N000003 HC R&B MH SENIOR/ADOLESCENT

## 2022-06-07 PROCEDURE — 250N000013 HC RX MED GY IP 250 OP 250 PS 637: Performed by: PSYCHIATRY & NEUROLOGY

## 2022-06-07 PROCEDURE — 99233 SBSQ HOSP IP/OBS HIGH 50: CPT | Mod: GT | Performed by: PSYCHIATRY & NEUROLOGY

## 2022-06-07 RX ORDER — NYSTATIN 100000 U/G
CREAM TOPICAL 2 TIMES DAILY PRN
Status: DISCONTINUED | OUTPATIENT
Start: 2022-06-07 | End: 2022-10-13 | Stop reason: HOSPADM

## 2022-06-07 RX ADMIN — BENZTROPINE MESYLATE 0.5 MG: 0.5 TABLET ORAL at 20:12

## 2022-06-07 RX ADMIN — BENZTROPINE MESYLATE 0.5 MG: 0.5 TABLET ORAL at 08:20

## 2022-06-07 RX ADMIN — SALMETEROL XINAFOATE 1 PUFF: 50 POWDER, METERED ORAL; RESPIRATORY (INHALATION) at 08:46

## 2022-06-07 RX ADMIN — MIRTAZAPINE 15 MG: 15 TABLET, FILM COATED ORAL at 20:12

## 2022-06-07 RX ADMIN — Medication 5 MG: at 20:14

## 2022-06-07 RX ADMIN — NYSTATIN: 100000 CREAM TOPICAL at 20:12

## 2022-06-07 RX ADMIN — MEMANTINE 10 MG: 10 TABLET ORAL at 20:13

## 2022-06-07 RX ADMIN — HALOPERIDOL 2 MG: 1 TABLET ORAL at 20:13

## 2022-06-07 RX ADMIN — GABAPENTIN 100 MG: 100 CAPSULE ORAL at 17:16

## 2022-06-07 RX ADMIN — CLOZAPINE 225 MG: 100 TABLET ORAL at 20:13

## 2022-06-07 RX ADMIN — BUSPIRONE HYDROCHLORIDE 30 MG: 15 TABLET ORAL at 08:20

## 2022-06-07 RX ADMIN — SALMETEROL XINAFOATE 1 PUFF: 50 POWDER, METERED ORAL; RESPIRATORY (INHALATION) at 20:12

## 2022-06-07 RX ADMIN — MEMANTINE 10 MG: 10 TABLET ORAL at 08:20

## 2022-06-07 RX ADMIN — GABAPENTIN 100 MG: 100 CAPSULE ORAL at 08:20

## 2022-06-07 RX ADMIN — BUSPIRONE HYDROCHLORIDE 30 MG: 15 TABLET ORAL at 20:13

## 2022-06-07 RX ADMIN — DONEPEZIL HYDROCHLORIDE 10 MG: 10 TABLET ORAL at 20:13

## 2022-06-07 RX ADMIN — LEVOTHYROXINE SODIUM 88 MCG: 0.09 TABLET ORAL at 08:20

## 2022-06-07 RX ADMIN — ASPIRIN 81 MG: 81 TABLET, COATED ORAL at 08:20

## 2022-06-07 RX ADMIN — GABAPENTIN 100 MG: 100 CAPSULE ORAL at 12:19

## 2022-06-07 RX ADMIN — MEGESTROL ACETATE 20 MG: 20 TABLET ORAL at 08:20

## 2022-06-07 ASSESSMENT — ACTIVITIES OF DAILY LIVING (ADL)
DRESS: SCRUBS (BEHAVIORAL HEALTH)
LAUNDRY: UNABLE TO COMPLETE
ORAL_HYGIENE: PROMPTS
HYGIENE/GROOMING: PROMPTS
HYGIENE/GROOMING: PROMPTS
ORAL_HYGIENE: PROMPTS
DRESS: SCRUBS (BEHAVIORAL HEALTH);INDEPENDENT

## 2022-06-07 NOTE — PLAN OF CARE
"  Problem: Behavior Regulation Impairment (Psychotic Signs/Symptoms)  Goal: Improved Behavioral Control (Psychotic Signs/Symptoms)  Outcome: Ongoing, Not Progressing   Goal Outcome Evaluation:    Plan of Care Reviewed With: patient     Patient is isolative and withdrawn. He just flashes a smile when this writer went inside his room for a 1:1 check-in. Patient denies SI/SIB nor hallucinations. He says that \"I am fine\". His responses to verbal stimulation remain limited. Writer encouraged him to push fluids. He only ate about 25% of his dinner. He only drank 1/2 cup (120 ml) of his milk and 1/2 (60 ml) of his orange juice. He went back to his room after dinner. He declined to attend groups. Patient is med compliant.                 "

## 2022-06-07 NOTE — PLAN OF CARE
"Assessment:  John has been in bed almost the entire shift. Pt's  Physician, CTC, and Writer have  attempted throughout the shift to get pt out of his room and walk/ take meals. Pt has refused. Jose has turned himself frequently in his bed throughout the shift. Pt has also urinated at least x 2 this shift in his toilet, mostly missing his urine collection hat.     Jose has had roughly 240 ml of gatorade and 240 of water total with his medications. Pt has had 4 oz of Ensure and  2oz of [place]. Jose ate the blueberry muffin sent on his breakfast tray for total 24 carbs.     Jose denies having suicidal ideation or self harm thoughts. Pt has repeatedly asked writer \"when is my ride coming\"      " No

## 2022-06-07 NOTE — PLAN OF CARE
Goal Outcome Evaluation:    Plan of Care Reviewed With: patient      Patient slept comfortably well for 9.75 hours without interruptions. Nothing unusual noted. No complaints made.

## 2022-06-07 NOTE — PLAN OF CARE
Progress note:  - Hospital  called to ask writer questions for patient's guardianship hearing.   -  requested writer to be present at the hearing on 6/9.

## 2022-06-07 NOTE — PROGRESS NOTES
PSYCHIATRY  PROGRESS NOTE     DATE OF SERVICE   06/07/2022  The patient is a 56 year old male who is being evaluated via a video billable telemedicine visit. The patient/guardian has consented to being seen via telemedicine. The provider was in front of a computer in a home office. The patient was on the inpatient unit at [facility].    Start time: 10:35 AM  Stop time: 10:40 AM    The patient/guardian has been notified of the following:     This telemedicine visit is conducted live between you and your clinician. We have found that certain health care needs can be provided without the need for a physical exam. This service lets us provide the care you need with a telemedicine conversation.           CHIEF COMPLAINT   Patient was admitted due to inability to safely care for himself and psychosis.       SUBJECTIVE   Nursing reports: Patient is calm, medication compliant. Mood appears depressed, minimally engaged with MH check in. Patient is isolative and withdrawn. Declined to come out for breakfast ate 0%, came out for lunch ate 50%. VSS, no pain.      report:  will be present during the guardianship hearing.     OBJECTIVE   Patient was seen and evaluated at bedside by himself, this was done with the use of telehealth.  Patient remains withdrawn and not able to engage in a conversation with this writer.  He responded most of the time to yes or no questions.  Patient denied hearing voices but continues to present as internally preoccupied.  He is denying having any thoughts of harming himself or harming others.  He has poor understanding of his medical and psychiatric problems and continues to make decisions based on his delusions.    Today I had a 25-minute visit with the hospital .  I had an opportunity to reviewed with the  how the patient is doing, reasons why we are recommending for the patient to go to a nursing home and why the patient does not have capacity to  "make medical decisions or decisions about his discharge at this time.     MEDICATIONS   Medications:  Scheduled Meds:    aspirin  81 mg Oral Daily     benztropine  0.5 mg Oral BID     busPIRone HCl  30 mg Oral BID     cloZAPine  225 mg Oral At Bedtime     donepezil  10 mg Oral At Bedtime     gabapentin  100 mg Oral TID w/meals     haloperidol  2 mg Oral At Bedtime     levothyroxine  88 mcg Oral Daily     megestrol  20 mg Oral Daily     melatonin  5 mg Oral At Bedtime     memantine  10 mg Oral BID     mirtazapine  15 mg Oral At Bedtime     salmeterol  1 puff Inhalation BID     Continuous Infusions:  PRN Meds:.[Held by provider] acetaminophen, albuterol, alum & mag hydroxide-simethicone, benzocaine-menthol, hydrOXYzine, nicotine, nystatin, polyethylene glycol, polyethylene glycol-propylene glycol PF, senna-docusate    Medication adherence issues: MS Med Adherence Y/N: Yes, Hospitalization  Medication side effects: MEDICATION SIDE EFFECTS: no side effects reported  Benefit: Yes / No: Yes       ROS   A comprehensive review of systems was negative.       MENTAL STATUS EXAM   Vitals: /79 (BP Location: Right arm, Patient Position: Supine, Cuff Size: Adult Regular)   Pulse 91   Temp 97.4  F (36.3  C) (Temporal)   Resp 16   Ht 1.778 m (5' 10\")   Wt 77.6 kg (171 lb)   SpO2 98%   BMI 24.54 kg/m        Appearance:  No apparent distress  Mood: \"I doing all right.\"  Affect: Blunted  Suicidal Ideation: Denied  Homicidal Ideation: Denied  Thought process: concrete  Thought content: Remains confused and delusional.  Continues to be internally preoccupied  Fund of Knowledge: Below average  Attention/Concentration: Poor  Language ability: Significantly impaired  Memory: Global memory impairment  Insight:  Poor.  Judgement: Poor  Orientation: Only to person  Psychomotor Behavior: slowed    Muscle Strength and Tone: MuscleStrength: Normal and Atrophy  Gait and Station: Not evaluated       LABS   personally reviewed.     No " results found for: PHENYTOIN, PHENOBARB, VALPROATE, CBMZ       DIAGNOSIS   Principal Problem:    Major neurocognitive disorder, due to multiple etiologies, with behavioral disturbance, severe (H)    Active Problem List:  Patient Active Problem List   Diagnosis     TBI with aggressive behavior     HTN (hypertension)     COPD (chronic obstructive pulmonary disease) (H)     Dementia with behavioral disturbance (H)     Chronic schizophrenia (H)     Smoker     Agitation     Behavior disturbance     Psychosis (H)     Major neurocognitive disorder, due to multiple etiologies, with behavioral disturbance, severe (H)     Paranoid schizophrenia, chronic condition with acute exacerbation (H)     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 Ongoing education given regarding diagnostic and treatment options with risks, benefits and alternatives and adequate verbalization of understanding.  2.  Medications       BuSpar 30 mg 2 times daily       Clozaril 225 mg at bedtime       Aricept 10 mg at bedtime       Gabapentin 100 mg 3 times a day       decrease Haldol 2 mg at bedtime       Namenda 10 mg 2 times a day       Remeron 15 mg at bedtime       melatonin 5 mg at bedtime  3.  Medical team following the patient   4.   coordinating a safe discharge plan with the patient.    Risk Assessment: [facility] RISK ASSESSMENT: Patient able to contract for safety    Coordination of Care:   Treatment Plan reviewed and physician signed, Care discussed with Care/Treatment Team Members, Chart reviewed and Patient seen      Re-Certification I certify that the inpatient psychiatric facility services furnished since the previous certification were, and continue to be, medically necessary for, either, treatment which could reasonably be expected to improve the patient s  condition or diagnostic study and that the hospital records indicate that the services furnished were, either, intensive treatment services, admission and related services necessary for diagnostic study, or equivalent services.     I certify that the patient continues to need, on a daily basis, active treatment furnished directly by or requiring the supervision of inpatient psychiatric facility personnel.   I estimate 14 days of hospitalization is necessary for proper treatment of the patient. My plans for post-hospital care for this patient are  TBD     Ginger Dubon MD    -     06/07/2022  -     6:46 PM    Total time 35 minutes with > 50%spent on coordination of cares and psycho-education.    This note was created with help of Dragon dictation system. Grammatical / typing errors are not intentional.    Ginger Dubon MD

## 2022-06-07 NOTE — PLAN OF CARE
Problem: Behavior Regulation Impairment (Psychotic Signs/Symptoms)  Goal: Improved Behavioral Control (Psychotic Signs/Symptoms)  Outcome: Ongoing, Not Progressing     Problem: Cognitive Impairment (Psychotic Signs/Symptoms)  Goal: Optimal Cognitive Function (Psychotic Signs/Symptoms)  Outcome: Ongoing, Not Progressing  Intervention: Support and Promote Cognitive Ability  Recent Flowsheet Documentation  Taken 6/7/2022 1004 by Juarez Murphy, RN  Trust Relationship/Rapport:   care explained   questions encouraged   thoughts/feelings acknowledged     Problem: Decreased Participation and Engagement (Psychotic Signs/Symptoms)  Goal: Increased Participation and Engagement (Psychotic Signs/Symptoms)  Outcome: Ongoing, Not Progressing   Goal Outcome Evaluation:    Plan of Care Reviewed With: patient     Patient is calm, medication compliant. Mood appears depressed, minimally engaged with MH check in. Patient is isolative and withdrawn. Declined to come out for breakfast ate 0%, came out for lunch ate 50%. VSS, no pain.

## 2022-06-08 PROCEDURE — 250N000013 HC RX MED GY IP 250 OP 250 PS 637: Performed by: PSYCHIATRY & NEUROLOGY

## 2022-06-08 PROCEDURE — 124N000003 HC R&B MH SENIOR/ADOLESCENT

## 2022-06-08 RX ADMIN — GABAPENTIN 100 MG: 100 CAPSULE ORAL at 08:48

## 2022-06-08 RX ADMIN — MEMANTINE 10 MG: 10 TABLET ORAL at 08:48

## 2022-06-08 RX ADMIN — MEMANTINE 10 MG: 10 TABLET ORAL at 20:18

## 2022-06-08 RX ADMIN — MEGESTROL ACETATE 20 MG: 20 TABLET ORAL at 08:48

## 2022-06-08 RX ADMIN — SALMETEROL XINAFOATE 1 PUFF: 50 POWDER, METERED ORAL; RESPIRATORY (INHALATION) at 08:48

## 2022-06-08 RX ADMIN — BUSPIRONE HYDROCHLORIDE 30 MG: 15 TABLET ORAL at 20:18

## 2022-06-08 RX ADMIN — SALMETEROL XINAFOATE 1 PUFF: 50 POWDER, METERED ORAL; RESPIRATORY (INHALATION) at 20:18

## 2022-06-08 RX ADMIN — MIRTAZAPINE 15 MG: 15 TABLET, FILM COATED ORAL at 20:18

## 2022-06-08 RX ADMIN — GABAPENTIN 100 MG: 100 CAPSULE ORAL at 17:22

## 2022-06-08 RX ADMIN — BENZTROPINE MESYLATE 0.5 MG: 0.5 TABLET ORAL at 20:18

## 2022-06-08 RX ADMIN — DONEPEZIL HYDROCHLORIDE 10 MG: 10 TABLET ORAL at 20:18

## 2022-06-08 RX ADMIN — LEVOTHYROXINE SODIUM 88 MCG: 0.09 TABLET ORAL at 08:48

## 2022-06-08 RX ADMIN — HALOPERIDOL 2 MG: 1 TABLET ORAL at 20:18

## 2022-06-08 RX ADMIN — BUSPIRONE HYDROCHLORIDE 30 MG: 15 TABLET ORAL at 08:48

## 2022-06-08 RX ADMIN — Medication 5 MG: at 20:18

## 2022-06-08 RX ADMIN — GABAPENTIN 100 MG: 100 CAPSULE ORAL at 12:52

## 2022-06-08 RX ADMIN — NYSTATIN: 100000 CREAM TOPICAL at 20:18

## 2022-06-08 RX ADMIN — ASPIRIN 81 MG: 81 TABLET, COATED ORAL at 08:48

## 2022-06-08 RX ADMIN — BENZTROPINE MESYLATE 0.5 MG: 0.5 TABLET ORAL at 08:48

## 2022-06-08 RX ADMIN — CLOZAPINE 225 MG: 100 TABLET ORAL at 20:18

## 2022-06-08 ASSESSMENT — ACTIVITIES OF DAILY LIVING (ADL)
ORAL_HYGIENE: PROMPTS
LAUNDRY: UNABLE TO COMPLETE
HYGIENE/GROOMING: PROMPTS
DRESS: SCRUBS (BEHAVIORAL HEALTH);INDEPENDENT

## 2022-06-08 NOTE — PROGRESS NOTES
I have tried to meet with the patient today twice.  The patient has been sleeping and unable to participate in the meeting.  As per nursing staff the patient continues to be at his baseline and no new behaviors have been reported.    Ginger Dubon MD

## 2022-06-08 NOTE — PLAN OF CARE
Problem: Sleep Disturbance (Psychotic Signs/Symptoms)  Goal: Improved Sleep (Psychotic Signs/Symptoms)  Outcome: Ongoing, Progressing   Goal Outcome Evaluation:    Patient slept a total of 9 hours without any interruptions. No behavioral concerns noted the whole night.

## 2022-06-08 NOTE — PLAN OF CARE
Problem: Behavior Regulation Impairment (Psychotic Signs/Symptoms)  Goal: Improved Behavioral Control (Psychotic Signs/Symptoms)  Outcome: Ongoing, Not Progressing     Problem: Cognitive Impairment (Psychotic Signs/Symptoms)  Goal: Optimal Cognitive Function (Psychotic Signs/Symptoms)  Outcome: Ongoing, Not Progressing  Intervention: Support and Promote Cognitive Ability  Recent Flowsheet Documentation  Taken 6/7/2022 1817 by Jonelle Delatorre RN  Trust Relationship/Rapport:    care explained    questions encouraged    thoughts/feelings acknowledged    choices provided   Goal Outcome Evaluation:    Plan of Care Reviewed With: patient     Patient remains isolative and withdrawn. His mood is calm with a flat affect. He remains very minimal in offering responses to questions asked during the 1;1 check in. He denies SI/SIB nor hallucinations. Adherent to the medication regimen. His appetite is good. He consumed 75% of his dinner. He said, he is hungry. His speech is pressured and garbled and sometimes mumbles. Still lacks insights of why he is hospitalized. No statements that he wants to leave the unit. He is alert and oriented x 2. Patient's gait and balance are unsteady. He almost fell when he got up from his bed and walked towards the hallway. Patient instructed to get up slowly. He denied dizziness nor lightheadedness. He declined to attend groups this shift.

## 2022-06-08 NOTE — PLAN OF CARE
"  Problem: Cognitive Impairment (Psychotic Signs/Symptoms)  Goal: Optimal Cognitive Function (Psychotic Signs/Symptoms)  Outcome: Ongoing, Not Progressing  Intervention: Support and Promote Cognitive Ability  Recent Flowsheet Documentation  Taken 6/8/2022 1008 by Juarez Murphy RN  Trust Relationship/Rapport:   care explained   questions encouraged   thoughts/feelings acknowledged   Goal Outcome Evaluation:    Plan of Care Reviewed With: patient     Patient is calm, medication compliant. Alert to self only. Declined breakfast but had 50% of lunch. Not social with peers, did not attend groups remained isolative and withdrawn. Denies SI, SIB, Anxiety and depression. Endorses auditory hallucinations, stating they \"come and go\". Denies pain, vital signs stable.  "

## 2022-06-09 PROCEDURE — 250N000013 HC RX MED GY IP 250 OP 250 PS 637: Performed by: PSYCHIATRY & NEUROLOGY

## 2022-06-09 PROCEDURE — 124N000003 HC R&B MH SENIOR/ADOLESCENT

## 2022-06-09 PROCEDURE — 99233 SBSQ HOSP IP/OBS HIGH 50: CPT | Mod: GT | Performed by: PSYCHIATRY & NEUROLOGY

## 2022-06-09 RX ADMIN — SALMETEROL XINAFOATE 1 PUFF: 50 POWDER, METERED ORAL; RESPIRATORY (INHALATION) at 08:38

## 2022-06-09 RX ADMIN — MIRTAZAPINE 15 MG: 15 TABLET, FILM COATED ORAL at 19:45

## 2022-06-09 RX ADMIN — NYSTATIN: 100000 CREAM TOPICAL at 19:44

## 2022-06-09 RX ADMIN — BUSPIRONE HYDROCHLORIDE 30 MG: 15 TABLET ORAL at 08:38

## 2022-06-09 RX ADMIN — BUSPIRONE HYDROCHLORIDE 30 MG: 15 TABLET ORAL at 19:44

## 2022-06-09 RX ADMIN — Medication 5 MG: at 19:45

## 2022-06-09 RX ADMIN — DONEPEZIL HYDROCHLORIDE 10 MG: 10 TABLET ORAL at 19:45

## 2022-06-09 RX ADMIN — GABAPENTIN 100 MG: 100 CAPSULE ORAL at 17:19

## 2022-06-09 RX ADMIN — LEVOTHYROXINE SODIUM 88 MCG: 0.09 TABLET ORAL at 08:38

## 2022-06-09 RX ADMIN — GABAPENTIN 100 MG: 100 CAPSULE ORAL at 11:32

## 2022-06-09 RX ADMIN — MEMANTINE 10 MG: 10 TABLET ORAL at 08:38

## 2022-06-09 RX ADMIN — BENZTROPINE MESYLATE 0.5 MG: 0.5 TABLET ORAL at 19:45

## 2022-06-09 RX ADMIN — HALOPERIDOL 2 MG: 1 TABLET ORAL at 19:45

## 2022-06-09 RX ADMIN — BENZTROPINE MESYLATE 0.5 MG: 0.5 TABLET ORAL at 08:38

## 2022-06-09 RX ADMIN — MEMANTINE 10 MG: 10 TABLET ORAL at 19:53

## 2022-06-09 RX ADMIN — ASPIRIN 81 MG: 81 TABLET, COATED ORAL at 08:38

## 2022-06-09 RX ADMIN — SALMETEROL XINAFOATE 1 PUFF: 50 POWDER, METERED ORAL; RESPIRATORY (INHALATION) at 19:44

## 2022-06-09 RX ADMIN — MEGESTROL ACETATE 20 MG: 20 TABLET ORAL at 08:38

## 2022-06-09 RX ADMIN — CLOZAPINE 225 MG: 100 TABLET ORAL at 19:45

## 2022-06-09 RX ADMIN — GABAPENTIN 100 MG: 100 CAPSULE ORAL at 08:38

## 2022-06-09 ASSESSMENT — ACTIVITIES OF DAILY LIVING (ADL)
DRESS: SCRUBS (BEHAVIORAL HEALTH);INDEPENDENT
LAUNDRY: UNABLE TO COMPLETE
ORAL_HYGIENE: PROMPTS
HYGIENE/GROOMING: PROMPTS
DRESS: SCRUBS (BEHAVIORAL HEALTH);INDEPENDENT
HYGIENE/GROOMING: PROMPTS
LAUNDRY: UNABLE TO COMPLETE
ORAL_HYGIENE: PROMPTS

## 2022-06-09 NOTE — PLAN OF CARE
Problem: Behavior Regulation Impairment (Psychotic Signs/Symptoms)  Goal: Improved Behavioral Control (Psychotic Signs/Symptoms)  Outcome: Ongoing, Not Progressing     Problem: Cognitive Impairment (Psychotic Signs/Symptoms)  Goal: Optimal Cognitive Function (Psychotic Signs/Symptoms)  Outcome: Ongoing, Not Progressing  Intervention: Support and Promote Cognitive Ability  Recent Flowsheet Documentation  Taken 6/8/2022 1822 by Jonelle Delatorre RN  Trust Relationship/Rapport:    care explained    questions encouraged    thoughts/feelings acknowledged   Goal Outcome Evaluation:    Plan of Care Reviewed With: patient     Patient remains isolative and withdrawn. He just came out for meals. He did not attend groups this evening. He is laying down in bed at times dozing off. He is med compliant. No side effects of meds reported. He denies SI/SIB nor hallucinations. He ate 75% of his dinner. He is alert and oriented to self only. No statements heard about leaving the unit. He is very minimal in answering questions asked during the 1:1 check in. He will have guardianship hearing tomorrow at 1330.

## 2022-06-09 NOTE — PLAN OF CARE
"  Problem: Behavior Regulation Impairment (Psychotic Signs/Symptoms)  Goal: Improved Behavioral Control (Psychotic Signs/Symptoms)  Outcome: Ongoing, Not Progressing     Problem: Cognitive Impairment (Psychotic Signs/Symptoms)  Goal: Optimal Cognitive Function (Psychotic Signs/Symptoms)  Outcome: Ongoing, Not Progressing  Intervention: Support and Promote Cognitive Ability  Recent Flowsheet Documentation  Taken 6/9/2022 1040 by Jonelle Delatorre RN  Trust Relationship/Rapport:    care explained    questions encouraged    thoughts/feelings acknowledged   Goal Outcome Evaluation:    Plan of Care Reviewed With: patient     Patient isolative to his room. He does not interact with staff and peers when in the milieu during mealtime. His mood is calm with a flat affect. He ate 75% of both breakfast and lunch. Writer reminded him of his guardianship hearing this afternoon at 1330. Patient did not demonstrate any enthusiasm. He just gave a \"blank\" stare at this writer. He denied SI/SIB/HIB. No hallucinations reported. Patient still lacks insights of why he is hospitalized. Writer unable to assess patient's thoughts because patient is very minimal in his verbal responses- mostly he gives yes or no answer and he mumbles or whispers.                 "

## 2022-06-09 NOTE — PROGRESS NOTES
PSYCHIATRY  PROGRESS NOTE     DATE OF SERVICE   06/09/2022  The patient is a 56 year old male who is being evaluated via a video billable telemedicine visit. The patient/guardian has consented to being seen via telemedicine. The provider was in front of a computer in a home office. The patient was on the inpatient unit at [facility].    Start time: 11:40 AM  Stop time: 11:50 AM    Guardianship hearing  Start time: 1:30 PM  Stop time: 2 PM    The patient/guardian has been notified of the following:     This telemedicine visit is conducted live between you and your clinician. We have found that certain health care needs can be provided without the need for a physical exam. This service lets us provide the care you need with a telemedicine conversation.           CHIEF COMPLAINT   Patient was admitted due to inability to safely care for himself and psychosis.       SUBJECTIVE   Nursing reports:   Patient remains isolative and withdrawn. He just came out for meals. He did not attend groups this evening. He is laying down in bed at times dozing off. He is med compliant. No side effects of meds reported. He denies SI/SIB nor hallucinations. He ate 75% of his dinner. He is alert and oriented to self only. No statements heard about leaving the unit. He is very minimal in answering questions asked during the 1:1 check in. He will have guardianship hearing tomorrow at 1330.       report:  will be present during the guardianship hearing.     OBJECTIVE   Patient was seen and evaluated at bedside by himself, this was done with the use of telehealth.  Patient reported that today he is feeling tired.  He denies having any thoughts about harming himself or harming others and was able to contract for safety.  Patient has continued to hallucinate and during my assessment he admitted that he was talking to his mother and sister.  Patient was also internally preoccupied and easily distracted.    I  "informed the patient that today at 1:30 PM we will be having his guardianship hearing.  Patient said that he does not want to attend the hearing and only ask \"when am I going to get the hell out of here\".  Patient demanded for this writer to let the  know that he has a mansion in [place] and said that he he can discharge to this place today.  I informed the patient that I will let the  know but ultimately it would be the  decision.    I was present during the guardianship hearing that started of 1:30 PM.  I had an opportunity to testify.  Guardianship hearing at this time if still in progress and we will need to wait for the decision of the court.       MEDICATIONS   Medications:  Scheduled Meds:    aspirin  81 mg Oral Daily     benztropine  0.5 mg Oral BID     busPIRone HCl  30 mg Oral BID     cloZAPine  225 mg Oral At Bedtime     donepezil  10 mg Oral At Bedtime     gabapentin  100 mg Oral TID w/meals     haloperidol  2 mg Oral At Bedtime     levothyroxine  88 mcg Oral Daily     megestrol  20 mg Oral Daily     melatonin  5 mg Oral At Bedtime     memantine  10 mg Oral BID     mirtazapine  15 mg Oral At Bedtime     salmeterol  1 puff Inhalation BID     Continuous Infusions:  PRN Meds:.[Held by provider] acetaminophen, albuterol, alum & mag hydroxide-simethicone, benzocaine-menthol, hydrOXYzine, nicotine, nystatin, polyethylene glycol, polyethylene glycol-propylene glycol PF, senna-docusate    Medication adherence issues: MS Med Adherence Y/N: Yes, Hospitalization  Medication side effects: MEDICATION SIDE EFFECTS: no side effects reported  Benefit: Yes / No: Yes       ROS   A comprehensive review of systems was negative.       MENTAL STATUS EXAM   Vitals: /74 (BP Location: Right arm, Patient Position: Sitting)   Pulse 106   Temp 98.4  F (36.9  C) (Oral)   Resp 16   Ht 1.778 m (5' 10\")   Wt 77.6 kg (171 lb)   SpO2 98%   BMI 24.54 kg/m        Appearance:  No apparent distress  Mood: \"I am " "tired.\"  Affect: Blunted  Suicidal Ideation: Denied  Homicidal Ideation: Denied  Thought process: concrete  Thought content: Remains confused and delusional.  Continues to be internally preoccupied and actively hallucinating.  Fund of Knowledge: Below average  Attention/Concentration: Poor  Language ability: Significantly impaired  Memory: Global memory impairment  Insight:  Poor.  Judgement: Poor  Orientation: Only to person  Psychomotor Behavior: slowed    Muscle Strength and Tone: MuscleStrength: Normal and Atrophy  Gait and Station: Not evaluated       LABS   personally reviewed.     No results found for: PHENYTOIN, PHENOBARB, VALPROATE, CBMZ       DIAGNOSIS   Principal Problem:    Major neurocognitive disorder, due to multiple etiologies, with behavioral disturbance, severe (H)    Active Problem List:  Patient Active Problem List   Diagnosis     TBI with aggressive behavior     HTN (hypertension)     COPD (chronic obstructive pulmonary disease) (H)     Dementia with behavioral disturbance (H)     Chronic schizophrenia (H)     Smoker     Agitation     Behavior disturbance     Psychosis (H)     Major neurocognitive disorder, due to multiple etiologies, with behavioral disturbance, severe (H)     Paranoid schizophrenia, chronic condition with acute exacerbation (H)     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 Ongoing education given regarding diagnostic and treatment options with risks, benefits and alternatives and adequate verbalization of understanding.  2.  Medications       BuSpar 30 mg 2 times daily       Clozaril 225 mg at bedtime       Aricept 10 mg at bedtime       Gabapentin 100 mg 3 times a day       decrease Haldol 2 mg at bedtime       Namenda 10 mg 2 times a day       Remeron 15 mg at bedtime       melatonin 5 mg at bedtime  3.  " Medical team following the patient   4.   coordinating a safe discharge plan with the patient.    Risk Assessment: [facility] RISK ASSESSMENT: Patient able to contract for safety    Coordination of Care:   Treatment Plan reviewed and physician signed, Care discussed with Care/Treatment Team Members, Chart reviewed and Patient seen      Re-Certification I certify that the inpatient psychiatric facility services furnished since the previous certification were, and continue to be, medically necessary for, either, treatment which could reasonably be expected to improve the patient s condition or diagnostic study and that the hospital records indicate that the services furnished were, either, intensive treatment services, admission and related services necessary for diagnostic study, or equivalent services.     I certify that the patient continues to need, on a daily basis, active treatment furnished directly by or requiring the supervision of inpatient psychiatric facility personnel.   I estimate 14 days of hospitalization is necessary for proper treatment of the patient. My plans for post-hospital care for this patient are  TBD     Ginger Dubon MD    -     06/09/2022  -     2:03 PM    Total time 35 minutes with > 50%spent on coordination of cares and psycho-education.    This note was created with help of Dragon dictation system. Grammatical / typing errors are not intentional.    Ginger Dubon MD

## 2022-06-09 NOTE — PLAN OF CARE
Problem: Sleep Disturbance (Psychotic Signs/Symptoms)  Goal: Improved Sleep (Psychotic Signs/Symptoms)  Outcome: Ongoing, Progressing   Goal Outcome Evaluation:    Patient slept a total of 9 hours without any interruptions. No behavioral issues noted the whole night.

## 2022-06-10 LAB
BASOPHILS # BLD AUTO: 0.1 10E3/UL (ref 0–0.2)
BASOPHILS NFR BLD AUTO: 2 %
EOSINOPHIL # BLD AUTO: 1.8 10E3/UL (ref 0–0.7)
EOSINOPHIL NFR BLD AUTO: 27 %
ERYTHROCYTE [DISTWIDTH] IN BLOOD BY AUTOMATED COUNT: 13.5 % (ref 10–15)
HCT VFR BLD AUTO: 44.1 % (ref 40–53)
HGB BLD-MCNC: 15.6 G/DL (ref 13.3–17.7)
HOLD SPECIMEN: NORMAL
IMM GRANULOCYTES # BLD: 0 10E3/UL
IMM GRANULOCYTES NFR BLD: 1 %
LYMPHOCYTES # BLD AUTO: 1.6 10E3/UL (ref 0.8–5.3)
LYMPHOCYTES NFR BLD AUTO: 25 %
MCH RBC QN AUTO: 32 PG (ref 26.5–33)
MCHC RBC AUTO-ENTMCNC: 35.4 G/DL (ref 31.5–36.5)
MCV RBC AUTO: 91 FL (ref 78–100)
MONOCYTES # BLD AUTO: 0.6 10E3/UL (ref 0–1.3)
MONOCYTES NFR BLD AUTO: 9 %
NEUTROPHILS # BLD AUTO: 2.3 10E3/UL (ref 1.6–8.3)
NEUTROPHILS NFR BLD AUTO: 36 %
NRBC # BLD AUTO: 0 10E3/UL
NRBC BLD AUTO-RTO: 0 /100
PLATELET # BLD AUTO: 167 10E3/UL (ref 150–450)
RBC # BLD AUTO: 4.87 10E6/UL (ref 4.4–5.9)
WBC # BLD AUTO: 6.4 10E3/UL (ref 4–11)

## 2022-06-10 PROCEDURE — 124N000003 HC R&B MH SENIOR/ADOLESCENT

## 2022-06-10 PROCEDURE — 99232 SBSQ HOSP IP/OBS MODERATE 35: CPT | Performed by: PSYCHIATRY & NEUROLOGY

## 2022-06-10 PROCEDURE — 250N000013 HC RX MED GY IP 250 OP 250 PS 637: Performed by: PSYCHIATRY & NEUROLOGY

## 2022-06-10 PROCEDURE — 85025 COMPLETE CBC W/AUTO DIFF WBC: CPT | Performed by: PSYCHIATRY & NEUROLOGY

## 2022-06-10 PROCEDURE — 36415 COLL VENOUS BLD VENIPUNCTURE: CPT | Performed by: PSYCHIATRY & NEUROLOGY

## 2022-06-10 RX ADMIN — BUSPIRONE HYDROCHLORIDE 30 MG: 15 TABLET ORAL at 08:22

## 2022-06-10 RX ADMIN — SALMETEROL XINAFOATE 1 PUFF: 50 POWDER, METERED ORAL; RESPIRATORY (INHALATION) at 20:16

## 2022-06-10 RX ADMIN — GABAPENTIN 100 MG: 100 CAPSULE ORAL at 17:13

## 2022-06-10 RX ADMIN — BENZTROPINE MESYLATE 0.5 MG: 0.5 TABLET ORAL at 20:16

## 2022-06-10 RX ADMIN — MEMANTINE 10 MG: 10 TABLET ORAL at 20:16

## 2022-06-10 RX ADMIN — SALMETEROL XINAFOATE 1 PUFF: 50 POWDER, METERED ORAL; RESPIRATORY (INHALATION) at 08:22

## 2022-06-10 RX ADMIN — GABAPENTIN 100 MG: 100 CAPSULE ORAL at 12:16

## 2022-06-10 RX ADMIN — CLOZAPINE 225 MG: 100 TABLET ORAL at 20:16

## 2022-06-10 RX ADMIN — ASPIRIN 81 MG: 81 TABLET, COATED ORAL at 08:22

## 2022-06-10 RX ADMIN — BUSPIRONE HYDROCHLORIDE 30 MG: 15 TABLET ORAL at 20:16

## 2022-06-10 RX ADMIN — BENZTROPINE MESYLATE 0.5 MG: 0.5 TABLET ORAL at 08:22

## 2022-06-10 RX ADMIN — LEVOTHYROXINE SODIUM 88 MCG: 0.09 TABLET ORAL at 08:22

## 2022-06-10 RX ADMIN — HALOPERIDOL 2 MG: 1 TABLET ORAL at 20:16

## 2022-06-10 RX ADMIN — DONEPEZIL HYDROCHLORIDE 10 MG: 10 TABLET ORAL at 20:16

## 2022-06-10 RX ADMIN — GABAPENTIN 100 MG: 100 CAPSULE ORAL at 08:22

## 2022-06-10 RX ADMIN — MEMANTINE 10 MG: 10 TABLET ORAL at 08:22

## 2022-06-10 RX ADMIN — MIRTAZAPINE 15 MG: 15 TABLET, FILM COATED ORAL at 20:16

## 2022-06-10 RX ADMIN — MEGESTROL ACETATE 20 MG: 20 TABLET ORAL at 08:22

## 2022-06-10 RX ADMIN — Medication 5 MG: at 20:16

## 2022-06-10 ASSESSMENT — ACTIVITIES OF DAILY LIVING (ADL)
DRESS: SCRUBS (BEHAVIORAL HEALTH);INDEPENDENT
ORAL_HYGIENE: WITH ASSISTANCE;PROMPTS
HYGIENE/GROOMING: WITH ASSISTANCE;PROMPTS
DRESS: WITH ASSISTANCE;PROMPTS
ORAL_HYGIENE: PROMPTS
HYGIENE/GROOMING: PROMPTS
LAUNDRY: UNABLE TO COMPLETE

## 2022-06-10 NOTE — PROGRESS NOTES
"PSYCHIATRY  PROGRESS NOTE     DATE OF SERVICE   06/10/2022       CHIEF COMPLAINT   Patient was admitted due to inability to safely care for himself and psychosis.       SUBJECTIVE   Nursing reports:    Patient had a guardianship hearing early this afternoon. [facility]  will make the decision for guardianship of the patient since the latter is not capable of making decisions. Update will be given by [facility] to the health care team.      Patient remains isolative in his room and withdrawn. His affect is flat. He declined to attend to all group activities this shift even this morning. He is alert and oriented to self only. He is independent on adl's. He refused to take a shower this evening. Patient only provide a yes or no answer to questions asked. He denied SI/SIB nor hallucinations. He is med compliant. Still lacks insights the reason for his hospitalization. He was given a copy of the renewal of the commitment/pittman but no reaction/s observed when he received the documents.  Per [facility], the comm/pittman will  on . Patient is med compliant. He ate 75% of his dinner. He slept early after taking his bedtime medications.     Patient has been discussed in detail with the  during team meeting.  At this time we have not received any updates from the guardianship process.     OBJECTIVE   Patient was seen and evaluated with the nurse practitioner student present during the assessment, this was face-to-face evaluation.  The patient presented as calm and more engaged in the assessment today.  He continues to insist that the year is  and he does not know the date.  Patient did not remember attending the guardianship hearing.  I did discuss with the patient that we are still waiting for the decision of the  but patient responded \"when the fuck am I going to get out of here, I have a mansion in [place] and [place]\".  Patient stated that he does not want to go to a nursing " "home.  I reviewed with the patient that ultimately the decision will be from the court and we need to wait and see what they decide.  Patient did not seem to understand the information provided to him where he is staying calm during the assessment.     MEDICATIONS   Medications:  Scheduled Meds:    aspirin  81 mg Oral Daily     benztropine  0.5 mg Oral BID     busPIRone HCl  30 mg Oral BID     cloZAPine  225 mg Oral At Bedtime     donepezil  10 mg Oral At Bedtime     gabapentin  100 mg Oral TID w/meals     haloperidol  2 mg Oral At Bedtime     levothyroxine  88 mcg Oral Daily     megestrol  20 mg Oral Daily     melatonin  5 mg Oral At Bedtime     memantine  10 mg Oral BID     mirtazapine  15 mg Oral At Bedtime     salmeterol  1 puff Inhalation BID     Continuous Infusions:  PRN Meds:.[Held by provider] acetaminophen, albuterol, alum & mag hydroxide-simethicone, benzocaine-menthol, hydrOXYzine, nicotine, nystatin, polyethylene glycol, polyethylene glycol-propylene glycol PF, senna-docusate    Medication adherence issues: MS Med Adherence Y/N: Yes, Hospitalization  Medication side effects: MEDICATION SIDE EFFECTS: no side effects reported  Benefit: Yes / No: Yes       ROS   A comprehensive review of systems was negative.       MENTAL STATUS EXAM   Vitals: /88   Pulse 109   Temp 98.3  F (36.8  C)   Resp 16   Ht 1.778 m (5' 10\")   Wt 77.6 kg (171 lb)   SpO2 99%   BMI 24.54 kg/m      Same as last visit  Appearance:  No apparent distress  Mood: \"I am tired.\"  Affect: Blunted  Suicidal Ideation: Denied  Homicidal Ideation: Denied  Thought process: concrete  Thought content: Remains confused and delusional.  Continues to be internally preoccupied and actively hallucinating.  Fund of Knowledge: Below average  Attention/Concentration: Poor  Language ability: Significantly impaired  Memory: Global memory impairment  Insight:  Poor.  Judgement: Poor  Orientation: Only to person  Psychomotor Behavior: slowed  "   Muscle Strength and Tone: MuscleStrength: Normal and Atrophy  Gait and Station: Not evaluated       LABS   personally reviewed.     No results found for: PHENYTOIN, PHENOBARB, VALPROATE, CBMZ       DIAGNOSIS   Principal Problem:    Major neurocognitive disorder, due to multiple etiologies, with behavioral disturbance, severe (H)    Active Problem List:  Patient Active Problem List   Diagnosis     TBI with aggressive behavior     HTN (hypertension)     COPD (chronic obstructive pulmonary disease) (H)     Dementia with behavioral disturbance (H)     Chronic schizophrenia (H)     Smoker     Agitation     Behavior disturbance     Psychosis (H)     Major neurocognitive disorder, due to multiple etiologies, with behavioral disturbance, severe (H)     Paranoid schizophrenia, chronic condition with acute exacerbation (H)     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 Ongoing education given regarding diagnostic and treatment options with risks, benefits and alternatives and adequate verbalization of understanding.  2.  Medications       BuSpar 30 mg 2 times daily       Cogentin 0.5 mg 2 times a day       Clozaril 225 mg at bedtime       Aricept 10 mg at bedtime       Gabapentin 100 mg 3 times a day       decrease Haldol 2 mg at bedtime       Namenda 10 mg 2 times a day       Remeron 15 mg at bedtime       melatonin 5 mg at bedtime  3.  Medical team following the patient   4.   coordinating a safe discharge plan with the patient.    Risk Assessment: [facility] RISK ASSESSMENT: Patient able to contract for safety    Coordination of Care:   Treatment Plan reviewed and physician signed, Care discussed with Care/Treatment Team Members, Chart reviewed and Patient seen      Re-Certification I certify that the inpatient psychiatric facility services  furnished since the previous certification were, and continue to be, medically necessary for, either, treatment which could reasonably be expected to improve the patient s condition or diagnostic study and that the hospital records indicate that the services furnished were, either, intensive treatment services, admission and related services necessary for diagnostic study, or equivalent services.     I certify that the patient continues to need, on a daily basis, active treatment furnished directly by or requiring the supervision of inpatient psychiatric facility personnel.   I estimate 14 days of hospitalization is necessary for proper treatment of the patient. My plans for post-hospital care for this patient are  TBD     Ginger Dubon MD    -     06/10/2022  -     1:03 PM    Total time 25 minutes with > 50%spent on coordination of cares and psycho-education.    This note was created with help of Dragon dictation system. Grammatical / typing errors are not intentional.    Ginger Dubon MD

## 2022-06-10 NOTE — PLAN OF CARE
Goal Outcome Evaluation:    Plan of Care Reviewed With: patient      Patient had a guardianship hearing early this afternoon. [facility]  will make the decision for guardianship of the patient since the latter is not capable of making decisions. Update will be given by [facility] to the health care team.     Patient remains isolative in his room and withdrawn. His affect is flat. He declined to attend to all group activities this shift even this morning. He is alert and oriented to self only. He is independent on adl's. He refused to take a shower this evening. Patient only provide a yes or no answer to questions asked. He denied SI/SIB nor hallucinations. He is med compliant. Still lacks insights the reason for his hospitalization. He was given a copy of the renewal of the commitment/pittman but no reaction/s observed when he received the documents.  Per [facility], the comm/pittman will  on . Patient is med compliant. He ate 75% of his dinner. He slept early after taking his bedtime medications.

## 2022-06-10 NOTE — PLAN OF CARE
"  Problem: Mood Impairment (Psychotic Signs/Symptoms)  Goal: Improved Mood Symptoms (Psychotic Signs/Symptoms)  Outcome: Met  Flowsheets (Taken 6/10/2022 1142)Nursing Assessment    Psychosis (H) [F29]    Admit Date: 1/26/2022    Length of Stay: 135    Patient evaluation continues. Assessed mood,anxiety,thoughts and behavior. Patient is progressing towards goals. Patient is encouraged to participate in groups and assisted to develop healthy coping skills.  Patient admits to auditory hallucinations. /88   Pulse 109   Temp 98.3  F (36.8  C)   Resp 16   Ht 1.778 m (5' 10\")   Wt 77.6 kg (171 lb)   SpO2 99%   BMI 24.54 kg/m    Pt ambulated to the lounge and back with standby assist.  Mood: good    Patient reports depression none and reports anxiety none    Affect:flat blunted    Sleep: 10 hours    Appetite: good    SI: denies    HI: denies    SIB: denies      Medication Compliance yes, takes medication as prescribed    Group participation:none    ADL's: assisted with shower when pt is cooperative and bed bath    Fall risk interventions: standby assist    Rinku Score Interventions:none    Discharge planning in process    Refer to daily team meeting notes for individualized plan of care. Nursing will continue to assess.    *Scale is 1-10 and 10 is the worst.         Mutually Determined Action Steps (Improved Mood Symptoms): identifies thought distortion  Intervention: Optimize Emotion and Mood  Recent Flowsheet Documentation  Taken 6/10/2022 1100 by Wen Broderick, RN  Diversional Activity: music   Goal Outcome Evaluation:    Plan of Care Reviewed With: patient                 "

## 2022-06-10 NOTE — PLAN OF CARE
Problem: Sleep Disturbance (Psychotic Signs/Symptoms)  Goal: Improved Sleep (Psychotic Signs/Symptoms)  Outcome: Ongoing, Progressing   Goal Outcome Evaluation:    Patient slept a total of 10.5 hours without any interruptions. No behavioral concerns raised the whole shift.

## 2022-06-11 PROCEDURE — 250N000013 HC RX MED GY IP 250 OP 250 PS 637: Performed by: PSYCHIATRY & NEUROLOGY

## 2022-06-11 PROCEDURE — 124N000003 HC R&B MH SENIOR/ADOLESCENT

## 2022-06-11 RX ADMIN — MIRTAZAPINE 15 MG: 15 TABLET, FILM COATED ORAL at 21:24

## 2022-06-11 RX ADMIN — BENZTROPINE MESYLATE 0.5 MG: 0.5 TABLET ORAL at 08:47

## 2022-06-11 RX ADMIN — MEMANTINE 10 MG: 10 TABLET ORAL at 08:47

## 2022-06-11 RX ADMIN — SALMETEROL XINAFOATE 1 PUFF: 50 POWDER, METERED ORAL; RESPIRATORY (INHALATION) at 21:25

## 2022-06-11 RX ADMIN — Medication 5 MG: at 21:24

## 2022-06-11 RX ADMIN — BENZTROPINE MESYLATE 0.5 MG: 0.5 TABLET ORAL at 21:24

## 2022-06-11 RX ADMIN — GABAPENTIN 100 MG: 100 CAPSULE ORAL at 13:12

## 2022-06-11 RX ADMIN — BUSPIRONE HYDROCHLORIDE 30 MG: 15 TABLET ORAL at 21:23

## 2022-06-11 RX ADMIN — MEMANTINE 10 MG: 10 TABLET ORAL at 21:24

## 2022-06-11 RX ADMIN — CLOZAPINE 225 MG: 100 TABLET ORAL at 21:24

## 2022-06-11 RX ADMIN — MEGESTROL ACETATE 20 MG: 20 TABLET ORAL at 08:47

## 2022-06-11 RX ADMIN — DONEPEZIL HYDROCHLORIDE 10 MG: 10 TABLET ORAL at 21:24

## 2022-06-11 RX ADMIN — ASPIRIN 81 MG: 81 TABLET, COATED ORAL at 08:47

## 2022-06-11 RX ADMIN — SALMETEROL XINAFOATE 1 PUFF: 50 POWDER, METERED ORAL; RESPIRATORY (INHALATION) at 08:48

## 2022-06-11 RX ADMIN — GABAPENTIN 100 MG: 100 CAPSULE ORAL at 08:47

## 2022-06-11 RX ADMIN — HALOPERIDOL 2 MG: 1 TABLET ORAL at 21:24

## 2022-06-11 RX ADMIN — GABAPENTIN 100 MG: 100 CAPSULE ORAL at 18:22

## 2022-06-11 RX ADMIN — BUSPIRONE HYDROCHLORIDE 30 MG: 15 TABLET ORAL at 08:47

## 2022-06-11 RX ADMIN — LEVOTHYROXINE SODIUM 88 MCG: 0.09 TABLET ORAL at 08:47

## 2022-06-11 NOTE — PLAN OF CARE
Patient has remained sleeping in his room uninterrupted for the entire shift.  9.5 hrs of sleep.  No concerns reported.

## 2022-06-11 NOTE — PLAN OF CARE
Problem: Behavior Regulation Impairment (Psychotic Signs/Symptoms)  Goal: Improved Behavioral Control (Psychotic Signs/Symptoms)  Outcome: Ongoing, Not Progressing     Problem: Decreased Participation and Engagement (Psychotic Signs/Symptoms)  Goal: Increased Participation and Engagement (Psychotic Signs/Symptoms)  Outcome: Ongoing, Not Progressing  Intervention: Facilitate Participation and Engagement  Recent Flowsheet Documentation  Taken 6/10/2022 2035 by Jonelle Delatorre RN  Diversional Activity: music   Goal Outcome Evaluation:    Plan of Care Reviewed With: patient     Patient says that he is fine when asked how he is doing. He denies SI/SIB nor hallucinations. Denies wishing himself to be dead. He is alert and oriented to self only. He told this writer that he is hungry and that he wants cheese sandwich, however, it's not available. Writer informed the patient that dinner will be served in a litle while. Patient came out of his room and watched TV from afar. A glass of orange juice given to patient. Patient ate almost 100% of his dinner, afterwhich he went back to his room.  Patient's mood is calm with flat affect just the same. Writer unable to assess patient's organization of thoughts because he only provides a yes or no answer to questions asked during the 1:1 check in. His voice is soft and can hardly be heard. He mumbles and whispers.     His appearance is unkempt with unshaven mustache and beard. Hair is uncombed. Comb given to comb his hair which he complied. Patient is med compliant. He denies pain in any part of his body.  He is sleeping good at night averaging 8-9 hrs/night. He also takes a nap during day time. Patient still refused to attend group activities programmed by the unit.

## 2022-06-11 NOTE — PLAN OF CARE
"  Problem: Fall Injury Risk  Goal: Absence of Fall and Fall-Related Injury  Outcome: Ongoing, Progressing   Goal Outcome Evaluation:             Nursing Assessment    Psychosis (H) [F29]    Admit Date: 1/26/2022    Length of Stay: 136    Patient evaluation continues. Assessed mood,anxiety,thoughts and behavior. Patient is not progressing towards goals in reference to fall risk.Pt has been slightly unsteady on his feet at times. Pt is standby assist while walking. Pt has been having lower blood pressures today. 100//73 sitting and 92/66 standing this am. 89/66 sitting this afternoon. Rechecked after 15 minutes with 100 ml water intake and blue smaller cuff 117.83 P86.Pt denies any dizziness or lightheadedness. Will give 250 ml of fluid orally and recheck B/P.Patient is encouraged to participate in groups and assisted to develop healthy coping skills.  Patient denies auditory or visual hallucinations. /73 (BP Location: Left arm)   Pulse 101   Temp 97.3  F (36.3  C) (Oral)   Resp 14   Ht 1.778 m (5' 10\")   Wt 77.6 kg (171 lb)   SpO2 97%   BMI 24.54 kg/m      Mood: good    Patient reports depression none and reports anxiety none    Affect: flat blunted    Sleep: 9 hours last night    Appetite: good    SI: denies    HI: denies    SIB: denies      Medication Compliance yes    Group participation:none    ADL's: assisted     Fall risk interventions: proper foot wear, orthostatic B/p, standby assist    Rinku Score Interventions:none    Discharge planning in process    Refer to daily team meeting notes for individualized plan of care. Nursing will continue to assess.    *Scale is 1-10 and 10 is the worst.                "

## 2022-06-12 PROCEDURE — 250N000013 HC RX MED GY IP 250 OP 250 PS 637: Performed by: PSYCHIATRY & NEUROLOGY

## 2022-06-12 PROCEDURE — 124N000003 HC R&B MH SENIOR/ADOLESCENT

## 2022-06-12 RX ADMIN — HALOPERIDOL 2 MG: 1 TABLET ORAL at 20:45

## 2022-06-12 RX ADMIN — BENZTROPINE MESYLATE 0.5 MG: 0.5 TABLET ORAL at 20:45

## 2022-06-12 RX ADMIN — BUSPIRONE HYDROCHLORIDE 30 MG: 15 TABLET ORAL at 08:53

## 2022-06-12 RX ADMIN — SALMETEROL XINAFOATE 1 PUFF: 50 POWDER, METERED ORAL; RESPIRATORY (INHALATION) at 08:53

## 2022-06-12 RX ADMIN — CLOZAPINE 225 MG: 100 TABLET ORAL at 20:44

## 2022-06-12 RX ADMIN — BUSPIRONE HYDROCHLORIDE 30 MG: 15 TABLET ORAL at 20:45

## 2022-06-12 RX ADMIN — BENZTROPINE MESYLATE 0.5 MG: 0.5 TABLET ORAL at 08:53

## 2022-06-12 RX ADMIN — DONEPEZIL HYDROCHLORIDE 10 MG: 10 TABLET ORAL at 20:44

## 2022-06-12 RX ADMIN — Medication 5 MG: at 20:44

## 2022-06-12 RX ADMIN — SALMETEROL XINAFOATE 1 PUFF: 50 POWDER, METERED ORAL; RESPIRATORY (INHALATION) at 20:44

## 2022-06-12 RX ADMIN — ASPIRIN 81 MG: 81 TABLET, COATED ORAL at 08:53

## 2022-06-12 RX ADMIN — MEMANTINE 10 MG: 10 TABLET ORAL at 20:44

## 2022-06-12 RX ADMIN — GABAPENTIN 100 MG: 100 CAPSULE ORAL at 08:53

## 2022-06-12 RX ADMIN — MIRTAZAPINE 15 MG: 15 TABLET, FILM COATED ORAL at 20:45

## 2022-06-12 RX ADMIN — MEMANTINE 10 MG: 10 TABLET ORAL at 08:53

## 2022-06-12 RX ADMIN — GABAPENTIN 100 MG: 100 CAPSULE ORAL at 18:02

## 2022-06-12 RX ADMIN — MEGESTROL ACETATE 20 MG: 20 TABLET ORAL at 08:53

## 2022-06-12 RX ADMIN — LEVOTHYROXINE SODIUM 88 MCG: 0.09 TABLET ORAL at 08:53

## 2022-06-12 RX ADMIN — GABAPENTIN 100 MG: 100 CAPSULE ORAL at 12:33

## 2022-06-12 ASSESSMENT — ACTIVITIES OF DAILY LIVING (ADL)
ORAL_HYGIENE: PROMPTS
DRESS: PROMPTS
HYGIENE/GROOMING: WITH ASSISTANCE

## 2022-06-12 NOTE — PLAN OF CARE
"  Problem: Behavior Regulation Impairment (Psychotic Signs/Symptoms)  Goal: Improved Behavioral Control (Psychotic Signs/Symptoms)  Outcome: Met   Goal Outcome Evaluation:    Plan of Care Reviewed With: patient           Nursing Assessment    Psychosis (H) [F29]    Admit Date: 1/26/2022    Length of Stay: 137    Patient evaluation continues. Assessed mood,anxiety,thoughts and behavior. Patient is progressing towards goals. Patient is encouraged to participate in groups and assisted to develop healthy coping skills.  Patient auditory or visual hallucinations. /88 (BP Location: Left arm, Patient Position: Supine, Cuff Size: Adult Regular)   Pulse 91   Temp 98.5  F (36.9  C) (Oral)   Resp 20   Ht 1.778 m (5' 10\")   Wt 77.6 kg (171 lb)   SpO2 99%   BMI 24.54 kg/m      Mood: \"ok\"    Patient reports depression does not respond to questions asked,  and reports anxiety none    Affect:blunted,flat    Sleep: slept 8 hours last night    Appetite: good    SI: denies    HI: denies    SIB: denies      Medication Compliance yes    Group participation: none    ADL's: assist, refuses at times    Fall risk interventions: proper footwear, orthostatic B/P,standby assist    Rinku Score Interventions:none    Discharge planning in process    Refer to daily team meeting notes for individualized plan of care. Nursing will continue to assess.    *Scale is 1-10 and 10 is the worst.             "

## 2022-06-12 NOTE — PLAN OF CARE
"  Problem: Behavior Regulation Impairment (Psychotic Signs/Symptoms)  Goal: Improved Behavioral Control (Psychotic Signs/Symptoms)  Outcome: Ongoing, Progressing   Goal Outcome Evaluation:    Plan of Care Reviewed With: patient               Nursing Assessment    Psychosis (H) [F29]    Admit Date: 1/26/2022    Length of Stay: 136    Patient evaluation continues. Assessed mood,anxiety,thoughts and behavior. Patient is  progressing towards goals. Patient is encouraged to participate in groups and assisted to develop healthy coping skills.  Patient admits to  auditory  hallucinations. /76 (BP Location: Right arm, Patient Position: Supine)   Pulse 109   Temp 98.8  F (37.1  C) (Oral)   Resp 16   Ht 1.778 m (5' 10\")   Wt 77.6 kg (171 lb)   SpO2 98%   BMI 24.54 kg/m      Mood: ok    Patient reports depression none and reports anxiety none    Affect:flat and blunted    Sleep: 9 hours last night    Appetite: good 75%    SI: denies    HI: denies    SIB: denies      Medication Compliance yes    Group participation:none    ADL's: assisted by staff    Fall risk interventions: proper foot wear, orthostatic B/p, standby assist    Rinku Score Interventions: none    Discharge planning in process    Refer to daily team meeting notes for individualized plan of care. Nursing will continue to assess.    *Scale is 1-10 and 10 is the worst.         "

## 2022-06-12 NOTE — PLAN OF CARE
Problem: Sleep Disturbance (Psychotic Signs/Symptoms)  Goal: Improved Sleep (Psychotic Signs/Symptoms)  Outcome: Met    Patient has remained in his room sleeping for the entire shift.  No concerns reported.  Up to restroom X1 overnight. 8.5 hrs of sleep. Continue with care plan.

## 2022-06-13 LAB — SARS-COV-2 RNA RESP QL NAA+PROBE: NEGATIVE

## 2022-06-13 PROCEDURE — 250N000013 HC RX MED GY IP 250 OP 250 PS 637: Performed by: PSYCHIATRY & NEUROLOGY

## 2022-06-13 PROCEDURE — 124N000003 HC R&B MH SENIOR/ADOLESCENT

## 2022-06-13 PROCEDURE — 99231 SBSQ HOSP IP/OBS SF/LOW 25: CPT | Performed by: PSYCHIATRY & NEUROLOGY

## 2022-06-13 PROCEDURE — U0003 INFECTIOUS AGENT DETECTION BY NUCLEIC ACID (DNA OR RNA); SEVERE ACUTE RESPIRATORY SYNDROME CORONAVIRUS 2 (SARS-COV-2) (CORONAVIRUS DISEASE [COVID-19]), AMPLIFIED PROBE TECHNIQUE, MAKING USE OF HIGH THROUGHPUT TECHNOLOGIES AS DESCRIBED BY CMS-2020-01-R: HCPCS | Performed by: PSYCHIATRY & NEUROLOGY

## 2022-06-13 RX ADMIN — ASPIRIN 81 MG: 81 TABLET, COATED ORAL at 08:42

## 2022-06-13 RX ADMIN — BUSPIRONE HYDROCHLORIDE 30 MG: 15 TABLET ORAL at 08:41

## 2022-06-13 RX ADMIN — SALMETEROL XINAFOATE 1 PUFF: 50 POWDER, METERED ORAL; RESPIRATORY (INHALATION) at 08:42

## 2022-06-13 RX ADMIN — BUSPIRONE HYDROCHLORIDE 30 MG: 15 TABLET ORAL at 20:28

## 2022-06-13 RX ADMIN — MEMANTINE 10 MG: 10 TABLET ORAL at 20:28

## 2022-06-13 RX ADMIN — MEMANTINE 10 MG: 10 TABLET ORAL at 08:42

## 2022-06-13 RX ADMIN — GABAPENTIN 100 MG: 100 CAPSULE ORAL at 12:28

## 2022-06-13 RX ADMIN — MEGESTROL ACETATE 20 MG: 20 TABLET ORAL at 08:42

## 2022-06-13 RX ADMIN — BENZTROPINE MESYLATE 0.5 MG: 0.5 TABLET ORAL at 20:28

## 2022-06-13 RX ADMIN — CLOZAPINE 225 MG: 100 TABLET ORAL at 20:28

## 2022-06-13 RX ADMIN — LEVOTHYROXINE SODIUM 88 MCG: 0.09 TABLET ORAL at 08:42

## 2022-06-13 RX ADMIN — HALOPERIDOL 2 MG: 1 TABLET ORAL at 20:28

## 2022-06-13 RX ADMIN — GABAPENTIN 100 MG: 100 CAPSULE ORAL at 08:41

## 2022-06-13 RX ADMIN — GABAPENTIN 100 MG: 100 CAPSULE ORAL at 17:42

## 2022-06-13 RX ADMIN — SALMETEROL XINAFOATE 1 PUFF: 50 POWDER, METERED ORAL; RESPIRATORY (INHALATION) at 20:29

## 2022-06-13 RX ADMIN — MIRTAZAPINE 15 MG: 15 TABLET, FILM COATED ORAL at 20:28

## 2022-06-13 RX ADMIN — DONEPEZIL HYDROCHLORIDE 10 MG: 10 TABLET ORAL at 20:28

## 2022-06-13 RX ADMIN — BENZTROPINE MESYLATE 0.5 MG: 0.5 TABLET ORAL at 08:42

## 2022-06-13 RX ADMIN — Medication 5 MG: at 20:28

## 2022-06-13 ASSESSMENT — ACTIVITIES OF DAILY LIVING (ADL)
HYGIENE/GROOMING: WITH ASSISTANCE
DRESS: SCRUBS (BEHAVIORAL HEALTH);PROMPTS
LAUNDRY: UNABLE TO COMPLETE
ORAL_HYGIENE: PROMPTS

## 2022-06-13 NOTE — PROGRESS NOTES
PSYCHIATRY  PROGRESS NOTE     DATE OF SERVICE   06/13/2022       CHIEF COMPLAINT   Patient was admitted due to inability to safely care for himself and psychosis.       SUBJECTIVE   Nursing reports:   Pt continues to isolate to room except for meals. Ate ~75% of dinner. Poverty of thought and speech. One-word responses. Oriented to self only. Affect flat. Poor eye contact. Gait steady. Med-compliant. Hygiene poor. Appears preoccupied with internal stimuli at times. Continue to encourage hydration. Continue to encourage pt to allow assistance with hygiene. Continue with current treatment plan and recommendations. Continue to monitor and reassess symptoms. Monitor response to medications. Monitor progress towards treatment goals. Encourage groups and participation     Patient has been discussed in detail with the  during team meeting.  We still have not received any updates from the guardianship process.     OBJECTIVE   Patient was seen and evaluated at bedside by himself, this was a face-to-face evaluation.  Today patient seems more withdrawn and internally preoccupied and responding to internal stimuli.  Patient was demanding to have a cigarette.  He does not have good understanding that he is in the hospital as he [facility] that [facility].  Continues to deny having thoughts of harming himself or harming others and has been able to contract for safety.     MEDICATIONS   Medications:  Scheduled Meds:    aspirin  81 mg Oral Daily     benztropine  0.5 mg Oral BID     busPIRone HCl  30 mg Oral BID     cloZAPine  225 mg Oral At Bedtime     donepezil  10 mg Oral At Bedtime     gabapentin  100 mg Oral TID w/meals     haloperidol  2 mg Oral At Bedtime     levothyroxine  88 mcg Oral Daily     megestrol  20 mg Oral Daily     melatonin  5 mg Oral At Bedtime     memantine  10 mg Oral BID     mirtazapine  15 mg Oral At Bedtime     salmeterol  1 puff Inhalation BID     Continuous Infusions:  PRN  "Meds:.[Held by provider] acetaminophen, albuterol, alum & mag hydroxide-simethicone, benzocaine-menthol, hydrOXYzine, nicotine, nystatin, polyethylene glycol, polyethylene glycol-propylene glycol PF, senna-docusate    Medication adherence issues: MS Med Adherence Y/N: Yes, Hospitalization  Medication side effects: MEDICATION SIDE EFFECTS: no side effects reported  Benefit: Yes / No: Yes       ROS   A comprehensive review of systems was negative.       MENTAL STATUS EXAM   Vitals: BP (!) 127/93   Pulse 105   Temp 97.6  F (36.4  C) (Temporal)   Resp 20   Ht 1.778 m (5' 10\")   Wt 77.6 kg (171 lb)   SpO2 99%   BMI 24.54 kg/m      Same as last visit  Appearance:  No apparent distress  Mood: \"I am tired.\"  Affect: Blunted  Suicidal Ideation: Denied  Homicidal Ideation: Denied  Thought process: concrete  Thought content: Remains confused and delusional.  Continues to be internally preoccupied and actively hallucinating.  Fund of Knowledge: Below average  Attention/Concentration: Poor  Language ability: Significantly impaired  Memory: Global memory impairment  Insight:  Poor.  Judgement: Poor  Orientation: Only to person  Psychomotor Behavior: slowed    Muscle Strength and Tone: MuscleStrength: Normal and Atrophy  Gait and Station: Not evaluated       LABS   personally reviewed.     No results found for: PHENYTOIN, PHENOBARB, VALPROATE, CBMZ       DIAGNOSIS   Principal Problem:    Major neurocognitive disorder, due to multiple etiologies, with behavioral disturbance, severe (H)    Active Problem List:  Patient Active Problem List   Diagnosis     TBI with aggressive behavior     HTN (hypertension)     COPD (chronic obstructive pulmonary disease) (H)     Dementia with behavioral disturbance (H)     Chronic schizophrenia (H)     Smoker     Agitation     Behavior disturbance     Psychosis (H)     Major neurocognitive disorder, due to multiple etiologies, with behavioral disturbance, severe (H)     Paranoid schizophrenia, " chronic condition with acute exacerbation (H)     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 Ongoing education given regarding diagnostic and treatment options with risks, benefits and alternatives and adequate verbalization of understanding.  2.  Medications       BuSpar 30 mg 2 times daily       Cogentin 0.5 mg 2 times a day       Clozaril 225 mg at bedtime       Aricept 10 mg at bedtime       Gabapentin 100 mg 3 times a day       Haldol 2 mg at bedtime       Namenda 10 mg 2 times a day       Remeron 15 mg at bedtime       melatonin 5 mg at bedtime  3.  Medical team following the patient   4.   coordinating a safe discharge plan with the patient.    Risk Assessment: [facility] RISK ASSESSMENT: Patient able to contract for safety    Coordination of Care:   Treatment Plan reviewed and physician signed, Care discussed with Care/Treatment Team Members, Chart reviewed and Patient seen      Re-Certification I certify that the inpatient psychiatric facility services furnished since the previous certification were, and continue to be, medically necessary for, either, treatment which could reasonably be expected to improve the patient s condition or diagnostic study and that the hospital records indicate that the services furnished were, either, intensive treatment services, admission and related services necessary for diagnostic study, or equivalent services.     I certify that the patient continues to need, on a daily basis, active treatment furnished directly by or requiring the supervision of inpatient psychiatric facility personnel.   I estimate 14 days of hospitalization is necessary for proper treatment of the patient. My plans for post-hospital care for this patient are  TBD     Ginger Dubon MD    -     06/13/2022  -      2:32 PM    Total time 15 minutes with > 50%spent on coordination of cares and psycho-education.    This note was created with help of Dragon dictation system. Grammatical / typing errors are not intentional.    Ginger Dubon MD

## 2022-06-13 NOTE — PLAN OF CARE
06/13/22 1039   Individualization/Patient Specific Goals   Patient Personal Strengths resilient   Patient Vulnerabilities lacks insight into illness;housing insecurity;history of unsuccessful treatment   Anxieties, Fears or Concerns Patient is unable to express concerns. At times he states he wants to leave and smoke cigarettes.   Individualized Care Needs Patient is awaiting guardianship petition decision, and is commited/pittman (recommitment hearing on 6/20).   Patient-Specific Goals (Include Timeframe) 10-15 days   Interprofessional Rounds   Summary Patient continues to stabilize at his baseline. He is unable to care for himself, needs continued help with ADLs, is not safe to discharge to a shelter, has cognitive impairments, and continues to have delusions. Team is waiting on guardianship decision.   Participants nursing;psychiatrist;[facility]   Team Discussion   Participants Dr. Root, RN - Wen, [facility] - [facility]   Progress Making progress   Anticipated length of stay 15-30 days   Continued Stay Criteria/Rationale Patient is unsafe to discharge to a shelter. Patient is on a commitment/pittman and is awaiting guardianship court decision.   Medical/Physical Please see H and P.   Precautions fall   Plan Continue with plan: medication management, psychiatric evaluations, nursing assessments, group therapy, milieu therapy, and CTC case management. Patient will discharge to assisted living or nursing home. Waiting for guardianship to be finalized. [facility] has accepted (pending rate agreements).   Safety Plan code 2, 3, s-15, comm/pittman, guardianship pending   Anticipated Discharge Disposition assisted living     PRECAUTIONS AND SAFETY    Behavioral Orders   Procedures     Code 1 - Restrict to Unit     Code 2     CT scan only     Code 3     Patient can go out of the unit with staff supervision. He needs to be taken out by him self with 2 staff and security present.     Fall precautions     Routine  Programming     As clinically indicated     Status 15     Every 15 minutes.       Safety  Safety WDL: WDL  Patient Location: patient room, own  Observed Behavior: calm  Observed Behavior (Comment): sleeping  Safety Measures: environmental rounds completed, safety rounds completed, suicide check-in completed  Diversional Activity: music  Suicidality: Status 15  Seizure precautions: calm, consistent lighting  Assault: status 15  Elopement: Statements about wanting to leave  Elopement: status 15  Sexual: status 15

## 2022-06-13 NOTE — PLAN OF CARE
Problem: Sensory Perception Impairment (Psychotic Signs/Symptoms)  Goal: Decreased Sensory Symptoms (Psychotic Signs/Symptoms)  Outcome: Ongoing, Progressing   Goal Outcome Evaluation:    Patient has remained in his room sleeping comfortably for the entire shift.  Up to restroom X1.  Independent with cares.   8.5 hrs of sleep. No concerns reported.

## 2022-06-13 NOTE — PLAN OF CARE
"  Problem: Behavior Regulation Impairment (Psychotic Signs/Symptoms)  Goal: Improved Behavioral Control (Psychotic Signs/Symptoms)  Outcome: Met   Goal Outcome Evaluation:    Plan of Care Reviewed With: patient           Nursing Assessment    Psychosis (H) [F29]    Admit Date: 1/26/2022    Length of Stay: 138    Patient evaluation continues. Assessed mood,anxiety,thoughts and behavior. Patient is  progressing towards goals. Patient is encouraged to participate in groups and assisted to develop healthy coping skills.  Patient denies auditory or visual hallucinations. BP (!) 127/93   Pulse 105   Temp 97.6  F (36.4  C) (Temporal)   Resp 20   Ht 1.778 m (5' 10\")   Wt 77.6 kg (171 lb)   SpO2 99%   BMI 24.54 kg/m      Mood: reports ok    Patient reports depression none and reports anxiety none    Affect:flat blunted    Sleep: 8 hours last night    Appetite: good 75%    SI: denies    HI: denies    SIB: denies      Medication Compliance yes    Group participation:none    ADL's: refuses but will wash when set up with basin    Fall risk interventions: proper foot wear, standby assist,orthostatic B/p    Rinku Score Interventions:none    Discharge planning in process    Refer to daily team meeting notes for individualized plan of care. Nursing will continue to assess.    *Scale is 1-10 and 10 is the worst.             "

## 2022-06-13 NOTE — PLAN OF CARE
Goal Outcome Evaluation:    Plan of Care Reviewed With: patient        Pt continues to isolate to room except for meals. Ate ~75% of dinner. Poverty of thought and speech. One-word responses. Oriented to self only. Affect flat. Poor eye contact. Gait steady. Med-compliant. Hygiene poor. Appears preoccupied with internal stimuli at times. Continue to encourage hydration. Continue to encourage pt to allow assistance with hygiene. Continue with current treatment plan and recommendations. Continue to monitor and reassess symptoms. Monitor response to medications. Monitor progress towards treatment goals. Encourage groups and participation

## 2022-06-13 NOTE — PLAN OF CARE
"Care coordination:  - Patient's care was discussed in team meeting.  - Writer initiated referral at [facility] as a possible back up to [facility].  - Writer checked the court status, as of 2:42 p.m. on [place], it shows the case is, \"Taken Under Advisement,\" from 6/9. No ruling from the  ( Bárbara Sorto), yet.   - Writer contacted patient's assigned commitment , Kurtis @ [facility], and left a message for Kurtis to contact writer to discuss possible discharge plans if the  rules against a guardianship.  - Writer updated [facility] on 6/13. [facility] confirmed they continue to hold a bed for patient, pending rate approval.    - Writer emailed [facility] [place]  to inquire about scheduling [place] (if the  does not support a guardianship).    Referrals:  - [facility]  - Initiated new referral for [facility].    Barriers to discharge:  - Guardianship hearing court findings (waiting on the ), placement, and funding barriers.         "

## 2022-06-14 PROCEDURE — 250N000013 HC RX MED GY IP 250 OP 250 PS 637: Performed by: PSYCHIATRY & NEUROLOGY

## 2022-06-14 PROCEDURE — 99231 SBSQ HOSP IP/OBS SF/LOW 25: CPT | Performed by: PSYCHIATRY & NEUROLOGY

## 2022-06-14 PROCEDURE — 124N000003 HC R&B MH SENIOR/ADOLESCENT

## 2022-06-14 RX ADMIN — CLOZAPINE 225 MG: 100 TABLET ORAL at 20:22

## 2022-06-14 RX ADMIN — DONEPEZIL HYDROCHLORIDE 10 MG: 10 TABLET ORAL at 20:22

## 2022-06-14 RX ADMIN — HALOPERIDOL 2 MG: 1 TABLET ORAL at 20:21

## 2022-06-14 RX ADMIN — GABAPENTIN 100 MG: 100 CAPSULE ORAL at 13:05

## 2022-06-14 RX ADMIN — SALMETEROL XINAFOATE 1 PUFF: 50 POWDER, METERED ORAL; RESPIRATORY (INHALATION) at 08:19

## 2022-06-14 RX ADMIN — MIRTAZAPINE 15 MG: 15 TABLET, FILM COATED ORAL at 20:22

## 2022-06-14 RX ADMIN — ASPIRIN 81 MG: 81 TABLET, COATED ORAL at 08:19

## 2022-06-14 RX ADMIN — GABAPENTIN 100 MG: 100 CAPSULE ORAL at 08:19

## 2022-06-14 RX ADMIN — MEMANTINE 10 MG: 10 TABLET ORAL at 08:19

## 2022-06-14 RX ADMIN — BUSPIRONE HYDROCHLORIDE 30 MG: 15 TABLET ORAL at 20:22

## 2022-06-14 RX ADMIN — Medication 5 MG: at 20:22

## 2022-06-14 RX ADMIN — SALMETEROL XINAFOATE 1 PUFF: 50 POWDER, METERED ORAL; RESPIRATORY (INHALATION) at 20:21

## 2022-06-14 RX ADMIN — BENZTROPINE MESYLATE 0.5 MG: 0.5 TABLET ORAL at 20:22

## 2022-06-14 RX ADMIN — BUSPIRONE HYDROCHLORIDE 30 MG: 15 TABLET ORAL at 08:19

## 2022-06-14 RX ADMIN — BENZTROPINE MESYLATE 0.5 MG: 0.5 TABLET ORAL at 08:19

## 2022-06-14 RX ADMIN — MEGESTROL ACETATE 20 MG: 20 TABLET ORAL at 08:19

## 2022-06-14 RX ADMIN — MEMANTINE 10 MG: 10 TABLET ORAL at 20:22

## 2022-06-14 RX ADMIN — LEVOTHYROXINE SODIUM 88 MCG: 0.09 TABLET ORAL at 08:19

## 2022-06-14 RX ADMIN — GABAPENTIN 100 MG: 100 CAPSULE ORAL at 17:17

## 2022-06-14 ASSESSMENT — ACTIVITIES OF DAILY LIVING (ADL)
HYGIENE/GROOMING: PROMPTS
LAUNDRY: UNABLE TO COMPLETE
HYGIENE/GROOMING: PROMPTS
ORAL_HYGIENE: PROMPTS
DRESS: SCRUBS (BEHAVIORAL HEALTH)
DRESS: SCRUBS (BEHAVIORAL HEALTH);PROMPTS
ORAL_HYGIENE: PROMPTS
LAUNDRY: UNABLE TO COMPLETE

## 2022-06-14 NOTE — PLAN OF CARE
"Care coordination:  - Patient's care was discussed in team meeting.  - Writer contacted central pre-admissions line to confirm patient is still on the list. [facility] There are 40 people in shelter that need to be placed first, and there are 160 people on the list.  - Writer faxed documents to [facility], as a back up referral.  - Writer left a message with patient's , Kurtis @ [employer]   - Writer asked [facility] if they will complete the [place] a/s/a/p, if the  does not support guardianship. Their response, \"We have been advised by our legal that guardianship orders need to be in place before the [place] can be [place].\"   - Writer is communicated with manager, , and Dr. Root.  - As of 6/14 3:56 p.m. [place] continues to show case is taken under advisement. The  has not ruled, yet.      Referrals:  - [facility]  - Initiated new referral for [facility].     Barriers to discharge:  - Guardianship hearing court findings (waiting on the ), placement, and funding barriers.   "

## 2022-06-14 NOTE — PLAN OF CARE
"Problem: Behavior Regulation Impairment (Psychotic Signs/Symptoms)  Goal: Improved Behavioral Control (Psychotic Signs/Symptoms)  Outcome: Ongoing, Progressing   Goal Outcome Evaluation:    Plan of Care Reviewed With: patient     Patient has been withdrawn and isolative to his room. Declined invitation to community meeting, even with encouragement. Patient is alert and oriented to self, disoriented to place, situation and time. Patient presents with flat and blunted affect. Mood is calm. Speech is minimal, slow and barely audible. Thought process noted to be disorganized. On approach, patient is guarded and very superficial with assessments.  Denied pain, anxiety, depression, SI/SIB/HI, denied A/V hallucination. Patient stated, \"I'm doing ok. I'm just hungry. \" Patient was mostly fixated on food. Staff offered crackers, juice encouraged adequate fluid intake. Patient was visible in the milieu for meals only and retired back to his room afterwards. No interaction with peers and staff. Gait a[ppears steady and balanced. Patient appeared disheveled and unkempt. Declined help with grooming. Patient reported no concerns at this time. No behavioral outburst noted nor reported so far. Patient was medication compliant. Staff will continue to monitor and offer assistance as needed.     "

## 2022-06-14 NOTE — PLAN OF CARE
Goal Outcome Evaluation:    Patient with 9 hrs of sleep. No concerns reported.  No episodes of auditory hallucination.

## 2022-06-14 NOTE — PROGRESS NOTES
PSYCHIATRY  PROGRESS NOTE     DATE OF SERVICE   06/14/2022  The patient is a 56 year old male who is being evaluated via a video billable telemedicine visit. The patient/guardian has consented to being seen via telemedicine. The provider was in front of a computer in a home office. The patient was on the inpatient unit at [facility].    Start time: 1:28 PM  Stop time: 1:33 PM    The patient/guardian has been notified of the following:     This telemedicine visit is conducted live between you and your clinician. We have found that certain health care needs can be provided without the need for a physical exam. This service lets us provide the care you need with a telemedicine conversation.           CHIEF COMPLAINT   Patient was admitted due to inability to safely care for himself and psychosis.       SUBJECTIVE   Nursing reports:   Patient is flat, guarded, and withdrawn.  He is dismissive with assessment.  Whispers responses and denies mental health symptoms.  Self talk is observed.  Patient is disoriented to person, place, time, and situation.  Patient is medication complaint.  Shower encouraged but patient declined.  Patient remains safe on unit.  Will continue to monitor. Le Cali RN      Patient has been discussed in detail with the  during team meeting.  We still have not received any updates from the guardianship process.     OBJECTIVE   Patient was seen and evaluated at bedside by himself, this was a face-to-face evaluation.  Patient continues to present at his baseline.  He was calm but disorganized and responding to internal stimuli.  He continues to be able to contract for safety.  It is unclear how much the patient is able to understand as most of the time patient does not answer questions appropriately.       MEDICATIONS   Medications:  Scheduled Meds:    aspirin  81 mg Oral Daily     benztropine  0.5 mg Oral BID     busPIRone HCl  30 mg Oral BID     cloZAPine  225 mg Oral At  "Bedtime     donepezil  10 mg Oral At Bedtime     gabapentin  100 mg Oral TID w/meals     haloperidol  2 mg Oral At Bedtime     levothyroxine  88 mcg Oral Daily     megestrol  20 mg Oral Daily     melatonin  5 mg Oral At Bedtime     memantine  10 mg Oral BID     mirtazapine  15 mg Oral At Bedtime     salmeterol  1 puff Inhalation BID     Continuous Infusions:  PRN Meds:.[Held by provider] acetaminophen, albuterol, alum & mag hydroxide-simethicone, benzocaine-menthol, hydrOXYzine, nicotine, nystatin, polyethylene glycol, polyethylene glycol-propylene glycol PF, senna-docusate    Medication adherence issues: MS Med Adherence Y/N: Yes, Hospitalization  Medication side effects: MEDICATION SIDE EFFECTS: no side effects reported  Benefit: Yes / No: Yes       ROS   A comprehensive review of systems was negative.       MENTAL STATUS EXAM   Vitals: /81   Pulse 86   Temp 96.9  F (36.1  C) (Temporal)   Resp 16   Ht 1.778 m (5' 10\")   Wt 78 kg (172 lb)   SpO2 98%   BMI 24.68 kg/m      Same as last visit  Appearance:  No apparent distress  Mood: \"I am tired.\"  Affect: Blunted  Suicidal Ideation: Denied  Homicidal Ideation: Denied  Thought process: concrete  Thought content: Remains confused and delusional.  Continues to be internally preoccupied and actively hallucinating.  Fund of Knowledge: Below average  Attention/Concentration: Poor  Language ability: Significantly impaired  Memory: Global memory impairment  Insight:  Poor.  Judgement: Poor  Orientation: Only to person  Psychomotor Behavior: slowed    Muscle Strength and Tone: MuscleStrength: Normal and Atrophy  Gait and Station: Not evaluated       LABS   personally reviewed.     No results found for: PHENYTOIN, PHENOBARB, VALPROATE, CBMZ       DIAGNOSIS   Principal Problem:    Major neurocognitive disorder, due to multiple etiologies, with behavioral disturbance, severe (H)    Active Problem List:  Patient Active Problem List   Diagnosis     TBI with aggressive " behavior     HTN (hypertension)     COPD (chronic obstructive pulmonary disease) (H)     Dementia with behavioral disturbance (H)     Chronic schizophrenia (H)     Smoker     Agitation     Behavior disturbance     Psychosis (H)     Major neurocognitive disorder, due to multiple etiologies, with behavioral disturbance, severe (H)     Paranoid schizophrenia, chronic condition with acute exacerbation (H)     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 Ongoing education given regarding diagnostic and treatment options with risks, benefits and alternatives and adequate verbalization of understanding.  2.  Medications       BuSpar 30 mg 2 times daily       Cogentin 0.5 mg 2 times a day       Clozaril 225 mg at bedtime       Aricept 10 mg at bedtime       Gabapentin 100 mg 3 times a day       Haldol 2 mg at bedtime       Namenda 10 mg 2 times a day       Remeron 15 mg at bedtime       melatonin 5 mg at bedtime  3.  Medical team following the patient   4.   coordinating a safe discharge plan with the patient.    Risk Assessment: [facility] RISK ASSESSMENT: Patient able to contract for safety    Coordination of Care:   Treatment Plan reviewed and physician signed, Care discussed with Care/Treatment Team Members, Chart reviewed and Patient seen      Re-Certification I certify that the inpatient psychiatric facility services furnished since the previous certification were, and continue to be, medically necessary for, either, treatment which could reasonably be expected to improve the patient s condition or diagnostic study and that the hospital records indicate that the services furnished were, either, intensive treatment services, admission and related services necessary for diagnostic study, or equivalent services.     I certify that the patient  continues to need, on a daily basis, active treatment furnished directly by or requiring the supervision of inpatient psychiatric facility personnel.   I estimate 14 days of hospitalization is necessary for proper treatment of the patient. My plans for post-hospital care for this patient are  TBD     Ginger Dubon MD    -     06/14/2022  -     4:19 PM    Total time 15 minutes with > 50%spent on coordination of cares and psycho-education.    This note was created with help of Dragon dictation system. Grammatical / typing errors are not intentional.    Ginger Dubon MD

## 2022-06-14 NOTE — PLAN OF CARE
Problem: Decreased Participation and Engagement (Psychotic Signs/Symptoms)  Goal: Increased Participation and Engagement (Psychotic Signs/Symptoms)  Outcome: Ongoing, Not Progressing  Intervention: Facilitate Participation and Engagement  Recent Flowsheet Documentation  Taken 6/13/2022 1907 by Jonelle Delatorre RN  Diversional Activity: music   Goal Outcome Evaluation:    Plan of Care Reviewed With: patient   Patient remains the same. He isolates himself in his room. He is withdrawn when he is in the milieu mostly during mealtime. He doesn't interact with peers. He only interacts with staff but his responses are very minimal. His mood is calm with a flat affect. Writer encouraged him this afternoon to take a shower but he refused to have one. Writer offered a wet wash cloth and a small bar soap. Patient wiped his face only. Writer offered to comb his hair. Patiet refused to change his scrubs suits. He said he is just okay. He is just hungry. Writer gave elíasn a glass of orange juice and a small pack of bonnie crackers. Patient took them well. He ate 75% of his dinner. He took all his bedtime meds.    Patient denies SI/SIB not hallucinations. But as the writer left the room, patient observed talking to himself. When asked whether, he sees or talks to somebody, patient just shook his head.     2140 Covid Test done. Specimen sent to lab for processing/analysis.

## 2022-06-14 NOTE — PLAN OF CARE
Problem: Decreased Participation and Engagement (Psychotic Signs/Symptoms)  Goal: Increased Participation and Engagement (Psychotic Signs/Symptoms)  Outcome: Ongoing, Not Progressing     Problem: Psychomotor Impairment (Psychotic Signs/Symptoms)  Goal: Improved Psychomotor Symptoms (Psychotic Signs/Symptoms)  Outcome: Ongoing, Progressing     Patient is flat, guarded, and withdrawn.  He is dismissive with assessment.  Whispers responses and denies mental health symptoms.  Self talk is observed.  Patient is disoriented to person, place, time, and situation.  Patient is medication complaint.  Shower encouraged but patient declined.  Patient remains safe on unit.  Will continue to monitor. Le Cali RN

## 2022-06-15 PROCEDURE — 124N000003 HC R&B MH SENIOR/ADOLESCENT

## 2022-06-15 PROCEDURE — 99231 SBSQ HOSP IP/OBS SF/LOW 25: CPT | Performed by: PSYCHIATRY & NEUROLOGY

## 2022-06-15 PROCEDURE — 250N000013 HC RX MED GY IP 250 OP 250 PS 637: Performed by: PSYCHIATRY & NEUROLOGY

## 2022-06-15 RX ADMIN — MEMANTINE 10 MG: 10 TABLET ORAL at 20:56

## 2022-06-15 RX ADMIN — MEGESTROL ACETATE 20 MG: 20 TABLET ORAL at 08:47

## 2022-06-15 RX ADMIN — GABAPENTIN 100 MG: 100 CAPSULE ORAL at 17:24

## 2022-06-15 RX ADMIN — BUSPIRONE HYDROCHLORIDE 30 MG: 15 TABLET ORAL at 08:48

## 2022-06-15 RX ADMIN — BENZTROPINE MESYLATE 0.5 MG: 0.5 TABLET ORAL at 20:55

## 2022-06-15 RX ADMIN — CLOZAPINE 225 MG: 100 TABLET ORAL at 20:55

## 2022-06-15 RX ADMIN — Medication 5 MG: at 20:56

## 2022-06-15 RX ADMIN — GABAPENTIN 100 MG: 100 CAPSULE ORAL at 12:52

## 2022-06-15 RX ADMIN — HALOPERIDOL 2 MG: 1 TABLET ORAL at 20:56

## 2022-06-15 RX ADMIN — LEVOTHYROXINE SODIUM 88 MCG: 0.09 TABLET ORAL at 08:47

## 2022-06-15 RX ADMIN — SALMETEROL XINAFOATE 1 PUFF: 50 POWDER, METERED ORAL; RESPIRATORY (INHALATION) at 20:56

## 2022-06-15 RX ADMIN — BUSPIRONE HYDROCHLORIDE 30 MG: 15 TABLET ORAL at 20:55

## 2022-06-15 RX ADMIN — DONEPEZIL HYDROCHLORIDE 10 MG: 10 TABLET ORAL at 20:55

## 2022-06-15 RX ADMIN — MIRTAZAPINE 15 MG: 15 TABLET, FILM COATED ORAL at 20:56

## 2022-06-15 RX ADMIN — SALMETEROL XINAFOATE 1 PUFF: 50 POWDER, METERED ORAL; RESPIRATORY (INHALATION) at 08:48

## 2022-06-15 RX ADMIN — ASPIRIN 81 MG: 81 TABLET, COATED ORAL at 08:49

## 2022-06-15 RX ADMIN — MEMANTINE 10 MG: 10 TABLET ORAL at 08:46

## 2022-06-15 RX ADMIN — BENZTROPINE MESYLATE 0.5 MG: 0.5 TABLET ORAL at 08:48

## 2022-06-15 RX ADMIN — GABAPENTIN 100 MG: 100 CAPSULE ORAL at 08:46

## 2022-06-15 ASSESSMENT — ACTIVITIES OF DAILY LIVING (ADL)
HYGIENE/GROOMING: PROMPTS
DRESS: SCRUBS (BEHAVIORAL HEALTH)
DRESS: SCRUBS (BEHAVIORAL HEALTH)
HYGIENE/GROOMING: PROMPTS
ORAL_HYGIENE: PROMPTS
ORAL_HYGIENE: PROMPTS

## 2022-06-15 NOTE — PROGRESS NOTES
"PSYCHIATRY  PROGRESS NOTE     DATE OF SERVICE   06/15/2022       CHIEF COMPLAINT   Patient was admitted due to inability to safely care for himself and psychosis.       SUBJECTIVE   Nursing reports:   patients affect remains flat, blunted and he remains isolated and withdrawn to self even when in mileu.  Patient is alert and oriented to place and self. He denies depression and or pain at this time and tells me he is safe via nod of head, responses are slow and spoken in a quiet almost whisper like  voice. I continue to encourage patient to shower and he responds with \"later\" patient remains med compliant and ate 100% of lunch, he declines my offer to attend groups, gait remains unsteady and refuses interventions. He denies anxiety and SI/HI/AH/VH, although continues with grandiose ideas and that he is discharging tomorrow to home, I do not dispute his claims of riches or discharge but continue to encourage fluid intake     Patient has been discussed in detail with the  during team meeting.  We still have not received any updates from the guardianship process.   has been informed that we cannot proceed with the [facility] assessment as the patient does not have a guardian.     OBJECTIVE   Patient was seen and evaluated at bedside by himself, this was a face-to-face evaluation.  Patient presented as calm but minimally cooperative with the assessment.  Patient stated that he is doing \"fine\".  He continues to be delusional thinking that he has a mansion and a lot of money.  When I asked the patient if he will sign himself to either nursing home or an assisted living facility patient replied \"send me to my home\".  He said that he will refuse to sign anything unless we send him to one of his mansions.       MEDICATIONS   Medications:  Scheduled Meds:    aspirin  81 mg Oral Daily     benztropine  0.5 mg Oral BID     busPIRone HCl  30 mg Oral BID     cloZAPine  225 mg Oral At Bedtime     donepezil  " "10 mg Oral At Bedtime     gabapentin  100 mg Oral TID w/meals     haloperidol  2 mg Oral At Bedtime     levothyroxine  88 mcg Oral Daily     megestrol  20 mg Oral Daily     melatonin  5 mg Oral At Bedtime     memantine  10 mg Oral BID     mirtazapine  15 mg Oral At Bedtime     salmeterol  1 puff Inhalation BID     Continuous Infusions:  PRN Meds:.[Held by provider] acetaminophen, albuterol, alum & mag hydroxide-simethicone, benzocaine-menthol, hydrOXYzine, nicotine, nystatin, polyethylene glycol, polyethylene glycol-propylene glycol PF, senna-docusate    Medication adherence issues: MS Med Adherence Y/N: Yes, Hospitalization  Medication side effects: MEDICATION SIDE EFFECTS: no side effects reported  Benefit: Yes / No: Yes       ROS   A comprehensive review of systems was negative.       MENTAL STATUS EXAM   Vitals: /72 (BP Location: Right arm, Patient Position: Supine, Cuff Size: Adult Small)   Pulse 86   Temp 98.5  F (36.9  C) (Oral)   Resp 16   Ht 1.778 m (5' 10\")   Wt 78 kg (172 lb)   SpO2 99%   BMI 24.68 kg/m      Appearance:  No apparent distress  Mood: \"Fine.\"  Affect: Blunted  Suicidal Ideation: Denied  Homicidal Ideation: Denied  Thought process: concrete  Thought content: Remains confused and delusional.  Continues to be internally preoccupied and actively hallucinating.  Fund of Knowledge: Below average  Attention/Concentration: Poor  Language ability: Significantly impaired  Memory: Global memory impairment  Insight:  Poor.  Judgement: Poor  Orientation: Only to person  Psychomotor Behavior: slowed    Muscle Strength and Tone: MuscleStrength: Normal and Atrophy  Gait and Station: Not evaluated       LABS   personally reviewed.     No results found for: PHENYTOIN, PHENOBARB, VALPROATE, CBMZ       DIAGNOSIS   Principal Problem:    Major neurocognitive disorder, due to multiple etiologies, with behavioral disturbance, severe (H)    Active Problem List:  Patient Active Problem List   Diagnosis "     TBI with aggressive behavior     HTN (hypertension)     COPD (chronic obstructive pulmonary disease) (H)     Dementia with behavioral disturbance (H)     Chronic schizophrenia (H)     Smoker     Agitation     Behavior disturbance     Psychosis (H)     Major neurocognitive disorder, due to multiple etiologies, with behavioral disturbance, severe (H)     Paranoid schizophrenia, chronic condition with acute exacerbation (H)     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 Ongoing education given regarding diagnostic and treatment options with risks, benefits and alternatives and adequate verbalization of understanding.  2.  Medications       BuSpar 30 mg 2 times daily       Cogentin 0.5 mg 2 times a day       Clozaril 225 mg at bedtime       Aricept 10 mg at bedtime       Gabapentin 100 mg 3 times a day       Haldol 2 mg at bedtime       Namenda 10 mg 2 times a day       Remeron 15 mg at bedtime       melatonin 5 mg at bedtime  3.  Medical team following the patient   4.   coordinating a safe discharge plan with the patient.    Risk Assessment: [facility] RISK ASSESSMENT: Patient able to contract for safety    Coordination of Care:   Treatment Plan reviewed and physician signed, Care discussed with Care/Treatment Team Members, Chart reviewed and Patient seen      Re-Certification I certify that the inpatient psychiatric facility services furnished since the previous certification were, and continue to be, medically necessary for, either, treatment which could reasonably be expected to improve the patient s condition or diagnostic study and that the hospital records indicate that the services furnished were, either, intensive treatment services, admission and related services necessary for diagnostic study, or equivalent services.     I  certify that the patient continues to need, on a daily basis, active treatment furnished directly by or requiring the supervision of inpatient psychiatric facility personnel.   I estimate 14 days of hospitalization is necessary for proper treatment of the patient. My plans for post-hospital care for this patient are  TBD     Ginger Dubon MD    -     06/15/2022  -     2:38 PM    Total time 15 minutes with > 50%spent on coordination of cares and psycho-education.    This note was created with help of Dragon dictation system. Grammatical / typing errors are not intentional.    Ginger Dubon MD

## 2022-06-15 NOTE — PLAN OF CARE
"  Problem: Cognitive Impairment (Psychotic Signs/Symptoms)  Goal: Optimal Cognitive Function (Psychotic Signs/Symptoms)  Intervention: Support and Promote Cognitive Ability  Recent Flowsheet Documentation  Taken 6/15/2022 1149 by Azael Solorio RN  Trust Relationship/Rapport:    care explained    empathic listening provided    questions encouraged    reassurance provided   Goal Outcome Evaluation:     Plan of Care Reviewed With: patient    Vitals: B/P: 102/72, T: 98.5, P: 86, R: 16    Symptomology: Major neurocognitive disorder, Falls      MH Summary: patients affect remains flat, blunted and he remains isolated and withdrawn to self even when in mileu.  Patient is alert and oriented to place and self. He denies depression and or pain at this time and tells me he is safe via nod of head, responses are slow and spoken in a quiet almost whisper like  voice. I continue to encourage patient to shower and he responds with \"later\" patient remains med compliant and ate 100% of lunch, he declines my offer to attend groups, gait remains unsteady and refuses interventions. He denies anxiety and SI/HI/AH/VH, although continues with grandiose ideas and that he is discharging tomorrow to home, I do not dispute his claims of riches or discharge but continue to encourage fluid intake                  "

## 2022-06-15 NOTE — PLAN OF CARE
Care coordination:  - Patient's care was discussed in team meeting.  - Writer spoke with Margot from [facility]. Margot stated the RN will contact writer to schedule intake interview with patient.       Referrals:  - [facility]  - New referral for [facility].     Barriers to discharge:  - Guardianship hearing court findings (waiting on the ), placement, and funding barriers.

## 2022-06-16 PROCEDURE — 99232 SBSQ HOSP IP/OBS MODERATE 35: CPT | Mod: GT | Performed by: PSYCHIATRY & NEUROLOGY

## 2022-06-16 PROCEDURE — 250N000013 HC RX MED GY IP 250 OP 250 PS 637: Performed by: PSYCHIATRY & NEUROLOGY

## 2022-06-16 PROCEDURE — 124N000003 HC R&B MH SENIOR/ADOLESCENT

## 2022-06-16 RX ADMIN — BENZTROPINE MESYLATE 0.5 MG: 0.5 TABLET ORAL at 08:21

## 2022-06-16 RX ADMIN — SALMETEROL XINAFOATE 1 PUFF: 50 POWDER, METERED ORAL; RESPIRATORY (INHALATION) at 08:20

## 2022-06-16 RX ADMIN — BUSPIRONE HYDROCHLORIDE 30 MG: 15 TABLET ORAL at 08:21

## 2022-06-16 RX ADMIN — BUSPIRONE HYDROCHLORIDE 30 MG: 15 TABLET ORAL at 20:29

## 2022-06-16 RX ADMIN — SALMETEROL XINAFOATE 1 PUFF: 50 POWDER, METERED ORAL; RESPIRATORY (INHALATION) at 20:30

## 2022-06-16 RX ADMIN — MIRTAZAPINE 15 MG: 15 TABLET, FILM COATED ORAL at 20:34

## 2022-06-16 RX ADMIN — MEGESTROL ACETATE 20 MG: 20 TABLET ORAL at 08:21

## 2022-06-16 RX ADMIN — DONEPEZIL HYDROCHLORIDE 10 MG: 10 TABLET ORAL at 20:29

## 2022-06-16 RX ADMIN — MEMANTINE 10 MG: 10 TABLET ORAL at 08:20

## 2022-06-16 RX ADMIN — LEVOTHYROXINE SODIUM 88 MCG: 0.09 TABLET ORAL at 08:21

## 2022-06-16 RX ADMIN — GABAPENTIN 100 MG: 100 CAPSULE ORAL at 12:29

## 2022-06-16 RX ADMIN — HALOPERIDOL 2 MG: 1 TABLET ORAL at 20:29

## 2022-06-16 RX ADMIN — Medication 5 MG: at 20:29

## 2022-06-16 RX ADMIN — CLOZAPINE 225 MG: 100 TABLET ORAL at 20:28

## 2022-06-16 RX ADMIN — ASPIRIN 81 MG: 81 TABLET, COATED ORAL at 08:21

## 2022-06-16 RX ADMIN — GABAPENTIN 100 MG: 100 CAPSULE ORAL at 08:21

## 2022-06-16 RX ADMIN — BENZTROPINE MESYLATE 0.5 MG: 0.5 TABLET ORAL at 20:29

## 2022-06-16 RX ADMIN — GABAPENTIN 100 MG: 100 CAPSULE ORAL at 17:59

## 2022-06-16 RX ADMIN — MEMANTINE 10 MG: 10 TABLET ORAL at 20:29

## 2022-06-16 ASSESSMENT — ACTIVITIES OF DAILY LIVING (ADL)
ORAL_HYGIENE: PROMPTS
LAUNDRY: UNABLE TO COMPLETE
ORAL_HYGIENE: PROMPTS
DRESS: PROMPTS
HYGIENE/GROOMING: PROMPTS
HYGIENE/GROOMING: PROMPTS
LAUNDRY: UNABLE TO COMPLETE
DRESS: PROMPTS

## 2022-06-16 NOTE — PLAN OF CARE
"  Problem: Behavioral Health Plan of Care  Goal: Adheres to Safety Considerations for Self and Others  Outcome: Ongoing, Progressing  Note:   Patient did not engage in conversation during rounding x 3. He was able to state his birthday with delay. Said he was having \"so so day\", and that he stays in his room, because there is nothing to do. Ate 100% of breakfast. Drinks fluid freely. Likes orange juice. As patient did not answer most of asked questions, it was difficult to assess his MH symptoms, orientation, or concerns. Denied pain. Patient did not exhibit behaviors suggesting paranoia. He neglected his hygiene, he appeared unkept and unclean. Declined to follow directions or accept help offered by staff. Isolated in his room, out for dinner only, after staff encouragement. No suicidal behaviors, gestures, or statements.   Patient took medications as scheduled without hesitation. Will continue with plan of care.    Plan of Care Reviewed With: patient   "

## 2022-06-16 NOTE — PLAN OF CARE
"  Problem: Psychomotor Impairment (Psychotic Signs/Symptoms)  Goal: Improved Psychomotor Symptoms (Psychotic Signs/Symptoms)  Outcome: Ongoing, Progressing  Intervention: Manage Psychomotor Movement  Recent Flowsheet Documentation  Taken 6/15/2022 2108 by Azael Solorio RN  Patient Performed Hygiene: dressed  Activity (Behavioral Health): activity encouraged  Taken 6/15/2022 1200 by Azael Solorio RN  Diversional Activity: (encouraged engagement with 1:1) other (see comments)  Activity (Behavioral Health): activity encouraged   Goal Outcome Evaluation:     Plan of Care Reviewed With: patient    Vitals: B/P: 119/85, T: 98.8, P: 108, R: 16    Symptomology: neurocognitive disorder, fall risk      MH Summary: patient exhibits flat blunt affect and has grandiose delusions about leaving hospital and \"going to his large home to smoke cigarettes,  Minh has the books that hold lots of money\" he refuses to shower at this time and appearance is dishelved He remains isolated to self and declines interaction with peers, he answers a few questions from staff, but then becomes irritated and refuses to answer anymore and responds via stares. Patient remains med compliant and appetite is adequate, gait remains unsteady but refuses assist. Patient denies SI, anxiety,depression or pain, noted patient has BM this shift.                 "

## 2022-06-16 NOTE — PROGRESS NOTES
"PSYCHIATRY  PROGRESS NOTE     DATE OF SERVICE   06/16/2022  The patient is a 56 year old male who is being evaluated via a video billable telemedicine visit. The patient/guardian has consented to being seen via telemedicine. The provider was in front of a computer in a home office. The patient was on the inpatient unit at [facility].    Start time: 10:40 AM  Stop time: 10:55 AM    The patient/guardian has been notified of the following:     This telemedicine visit is conducted live between you and your clinician. We have found that certain health care needs can be provided without the need for a physical exam. This service lets us provide the care you need with a telemedicine conversation.           CHIEF COMPLAINT   Patient was admitted due to inability to safely care for himself and psychosis.       SUBJECTIVE   Nursing reports:   Patient did not engage in conversation during rounding x 3. He was able to state his birthday with delay. Said he was having \"so so day\", and that he stays in his room, because there is nothing to do. Ate 100% of breakfast. Drinks fluid freely. Likes orange juice. As patient did not answer most of asked questions, it was difficult to assess his MH symptoms, orientation, or concerns. Denied pain. Patient did not exhibit behaviors suggesting paranoia. He neglected his hygiene, he appeared unkept and unclean. Declined to follow directions or accept help offered by staff. Isolated in his room, out for dinner only, after staff encouragement. No suicidal behaviors, gestures, or statements.   Patient took medications as scheduled without hesitation. Will continue with plan of care.     Patient has been discussed in detail with the  during team meeting.  We still have not received any updates from the guardianship process.     OBJECTIVE   Patient was seen and evaluated at bedside by himself, this was a face-to-face evaluation.  Today the patient was able to have a more " "extensive conversation with this writer without getting agitated.  Patient asked \"when the hell am I going to get out of here?\".  I had a discussion with the patient about the guardianship process and informed him that at this time we are waiting on the decision of the .  Patient replied \"guardianship is bullshit\".  Patient was not able to understand the guardianship process and the reason why the hospital is asking for him to have a guardian.  Patient continues to insist on this writer sending him to his mansion in [facility], he also insisted that he has a lot of money and that he can take care of himself.  Patient also ask about getting a cigarette.  Again I reminded the patient that he will not be able to smoke in the hospital.  Patient was able to accept this answer without getting angry.  He continues to denied having any thoughts of harming himself or harming others and he is shayan for safety.  Patient remains delusional and at times hallucinating but these seem to be at his baseline.       MEDICATIONS   Medications:  Scheduled Meds:    aspirin  81 mg Oral Daily     benztropine  0.5 mg Oral BID     busPIRone HCl  30 mg Oral BID     cloZAPine  225 mg Oral At Bedtime     donepezil  10 mg Oral At Bedtime     gabapentin  100 mg Oral TID w/meals     haloperidol  2 mg Oral At Bedtime     levothyroxine  88 mcg Oral Daily     megestrol  20 mg Oral Daily     melatonin  5 mg Oral At Bedtime     memantine  10 mg Oral BID     mirtazapine  15 mg Oral At Bedtime     salmeterol  1 puff Inhalation BID     Continuous Infusions:  PRN Meds:.[Held by provider] acetaminophen, albuterol, alum & mag hydroxide-simethicone, benzocaine-menthol, hydrOXYzine, nicotine, nystatin, polyethylene glycol, polyethylene glycol-propylene glycol PF, senna-docusate    Medication adherence issues: MS Med Adherence Y/N: Yes, Hospitalization  Medication side effects: MEDICATION SIDE EFFECTS: no side effects reported  Benefit: Yes / No: " "Yes       ROS   A comprehensive review of systems was negative.       MENTAL STATUS EXAM   Vitals: BP 97/67 (BP Location: Right arm)   Pulse 93   Temp 96.8  F (36  C) (Temporal)   Resp 16   Ht 1.778 m (5' 10\")   Wt 78 kg (172 lb)   SpO2 97%   BMI 24.68 kg/m        Appearance:  No apparent distress  Mood: \"I am alright.\"  Affect: Blunted  Suicidal Ideation: Denied  Homicidal Ideation: Denied  Thought process: concrete  Thought content: Remains confused and delusional.  Continues to be internally preoccupied and actively hallucinating.  Fund of Knowledge: Below average  Attention/Concentration: Poor  Language ability: Significantly impaired  Memory: Global memory impairment  Insight:  Poor.  Judgement: Poor  Orientation: Only to person  Psychomotor Behavior: slowed    Muscle Strength and Tone: MuscleStrength: Normal and Atrophy  Gait and Station: Not evaluated       LABS   personally reviewed.     No results found for: PHENYTOIN, PHENOBARB, VALPROATE, CBMZ       DIAGNOSIS   Principal Problem:    Major neurocognitive disorder, due to multiple etiologies, with behavioral disturbance, severe (H)    Active Problem List:  Patient Active Problem List   Diagnosis     TBI with aggressive behavior     HTN (hypertension)     COPD (chronic obstructive pulmonary disease) (H)     Dementia with behavioral disturbance (H)     Chronic schizophrenia (H)     Smoker     Agitation     Behavior disturbance     Psychosis (H)     Major neurocognitive disorder, due to multiple etiologies, with behavioral disturbance, severe (H)     Paranoid schizophrenia, chronic condition with acute exacerbation (H)     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 Ongoing education given regarding diagnostic and treatment options with risks, benefits and alternatives and adequate " verbalization of understanding.  2.  Medications       BuSpar 30 mg 2 times daily       Cogentin 0.5 mg 2 times a day       Clozaril 225 mg at bedtime       Aricept 10 mg at bedtime       Gabapentin 100 mg 3 times a day       Haldol 2 mg at bedtime       Namenda 10 mg 2 times a day       Remeron 15 mg at bedtime       melatonin 5 mg at bedtime  3.  Medical team following the patient   4.   coordinating a safe discharge plan with the patient.    Risk Assessment: [facility] RISK ASSESSMENT: Patient able to contract for safety    Coordination of Care:   Treatment Plan reviewed and physician signed, Care discussed with Care/Treatment Team Members, Chart reviewed and Patient seen      Re-Certification I certify that the inpatient psychiatric facility services furnished since the previous certification were, and continue to be, medically necessary for, either, treatment which could reasonably be expected to improve the patient s condition or diagnostic study and that the hospital records indicate that the services furnished were, either, intensive treatment services, admission and related services necessary for diagnostic study, or equivalent services.     I certify that the patient continues to need, on a daily basis, active treatment furnished directly by or requiring the supervision of inpatient psychiatric facility personnel.   I estimate 14 days of hospitalization is necessary for proper treatment of the patient. My plans for post-hospital care for this patient are  TBD     Ginger Dubon MD    -     06/16/2022  -     1:37 PM    Total time 25 minutes with > 50%spent on coordination of cares and psycho-education.    This note was created with help of Dragon dictation system. Grammatical / typing errors are not intentional.    Ginger Dubon MD

## 2022-06-16 NOTE — PLAN OF CARE
Problem: Sleep Disturbance (Psychotic Signs/Symptoms)  Goal: Improved Sleep (Psychotic Signs/Symptoms)  Outcome: Ongoing, Progressing   Goal Outcome Evaluation:    Patient slept for a total of 9.25 hours without any interruptions. No behavioral issues raised the whole shift.

## 2022-06-16 NOTE — PLAN OF CARE
Problem: Behavioral Health Plan of Care  Goal: Plan of Care Review  Outcome: Ongoing, Progressing  Flowsheets (Taken 6/16/2022 1700)  Plan of Care Reviewed With: patient  Patient Agreement with Plan of Care: refuses to participate     Problem: Behavioral Health Plan of Care  Goal: Absence of New-Onset Illness or Injury  Intervention: Identify and Manage Fall Risk  Recent Flowsheet Documentation  Taken 6/16/2022 1700 by Bharati Apodaca RN  Safety Measures: environmental rounds completed     Problem: Cognitive Impairment (Psychotic Signs/Symptoms)  Goal: Optimal Cognitive Function (Psychotic Signs/Symptoms)  Intervention: Support and Promote Cognitive Ability  Patient is withdrawn and  Isolated in his room. He declined all unit activities despite multiple invitations.He finally came out and sat in the arts therapy group for 10 seconds and then left. His affect remains flat and blunted. He is alert and orient ed to self and place but not the circumstance and time. His thought process is disorganized , appearance is disheveled and unkempt.He was encouraged to take a shower this evening but he turned it down. He remained calm and soft spoken but dismissive in his one worded responses.Patient denied pain, anxiety depression HI/SIB/HI, AH/VH and all other psych symptoms shayan to be safe. He continues to be medication compliant and  Staff encouraged patient to drink enough fluids.

## 2022-06-17 LAB
BASOPHILS # BLD AUTO: 0.1 10E3/UL (ref 0–0.2)
BASOPHILS NFR BLD AUTO: 1 %
EOSINOPHIL # BLD AUTO: 1.4 10E3/UL (ref 0–0.7)
EOSINOPHIL NFR BLD AUTO: 22 %
ERYTHROCYTE [DISTWIDTH] IN BLOOD BY AUTOMATED COUNT: 13.7 % (ref 10–15)
HCT VFR BLD AUTO: 42 % (ref 40–53)
HGB BLD-MCNC: 15.2 G/DL (ref 13.3–17.7)
IMM GRANULOCYTES # BLD: 0 10E3/UL
IMM GRANULOCYTES NFR BLD: 0 %
LYMPHOCYTES # BLD AUTO: 1.7 10E3/UL (ref 0.8–5.3)
LYMPHOCYTES NFR BLD AUTO: 28 %
MCH RBC QN AUTO: 32.4 PG (ref 26.5–33)
MCHC RBC AUTO-ENTMCNC: 36.2 G/DL (ref 31.5–36.5)
MCV RBC AUTO: 90 FL (ref 78–100)
MONOCYTES # BLD AUTO: 0.7 10E3/UL (ref 0–1.3)
MONOCYTES NFR BLD AUTO: 11 %
NEUTROPHILS # BLD AUTO: 2.3 10E3/UL (ref 1.6–8.3)
NEUTROPHILS NFR BLD AUTO: 38 %
NRBC # BLD AUTO: 0 10E3/UL
NRBC BLD AUTO-RTO: 0 /100
PLATELET # BLD AUTO: 124 10E3/UL (ref 150–450)
RBC # BLD AUTO: 4.69 10E6/UL (ref 4.4–5.9)
WBC # BLD AUTO: 6.1 10E3/UL (ref 4–11)

## 2022-06-17 PROCEDURE — 99231 SBSQ HOSP IP/OBS SF/LOW 25: CPT | Performed by: PSYCHIATRY & NEUROLOGY

## 2022-06-17 PROCEDURE — 85025 COMPLETE CBC W/AUTO DIFF WBC: CPT | Performed by: PSYCHIATRY & NEUROLOGY

## 2022-06-17 PROCEDURE — 99207 PR NO BILLABLE SERVICE THIS VISIT: CPT | Performed by: PHYSICIAN ASSISTANT

## 2022-06-17 PROCEDURE — 250N000013 HC RX MED GY IP 250 OP 250 PS 637: Performed by: PSYCHIATRY & NEUROLOGY

## 2022-06-17 PROCEDURE — 36415 COLL VENOUS BLD VENIPUNCTURE: CPT | Performed by: PSYCHIATRY & NEUROLOGY

## 2022-06-17 PROCEDURE — 124N000003 HC R&B MH SENIOR/ADOLESCENT

## 2022-06-17 RX ADMIN — Medication 5 MG: at 20:55

## 2022-06-17 RX ADMIN — GABAPENTIN 100 MG: 100 CAPSULE ORAL at 17:25

## 2022-06-17 RX ADMIN — BENZTROPINE MESYLATE 0.5 MG: 0.5 TABLET ORAL at 20:55

## 2022-06-17 RX ADMIN — HALOPERIDOL 2 MG: 1 TABLET ORAL at 20:54

## 2022-06-17 RX ADMIN — GABAPENTIN 100 MG: 100 CAPSULE ORAL at 13:04

## 2022-06-17 RX ADMIN — BENZTROPINE MESYLATE 0.5 MG: 0.5 TABLET ORAL at 08:37

## 2022-06-17 RX ADMIN — DONEPEZIL HYDROCHLORIDE 10 MG: 10 TABLET ORAL at 20:54

## 2022-06-17 RX ADMIN — MEMANTINE 10 MG: 10 TABLET ORAL at 20:55

## 2022-06-17 RX ADMIN — MEMANTINE 10 MG: 10 TABLET ORAL at 08:39

## 2022-06-17 RX ADMIN — MEGESTROL ACETATE 20 MG: 20 TABLET ORAL at 08:38

## 2022-06-17 RX ADMIN — GABAPENTIN 100 MG: 100 CAPSULE ORAL at 08:37

## 2022-06-17 RX ADMIN — CLOZAPINE 225 MG: 100 TABLET ORAL at 20:55

## 2022-06-17 RX ADMIN — ASPIRIN 81 MG: 81 TABLET, COATED ORAL at 08:38

## 2022-06-17 RX ADMIN — LEVOTHYROXINE SODIUM 88 MCG: 0.09 TABLET ORAL at 08:38

## 2022-06-17 RX ADMIN — BUSPIRONE HYDROCHLORIDE 30 MG: 15 TABLET ORAL at 20:54

## 2022-06-17 RX ADMIN — BUSPIRONE HYDROCHLORIDE 30 MG: 15 TABLET ORAL at 08:37

## 2022-06-17 RX ADMIN — SALMETEROL XINAFOATE 1 PUFF: 50 POWDER, METERED ORAL; RESPIRATORY (INHALATION) at 20:59

## 2022-06-17 RX ADMIN — MIRTAZAPINE 15 MG: 15 TABLET, FILM COATED ORAL at 20:55

## 2022-06-17 ASSESSMENT — ACTIVITIES OF DAILY LIVING (ADL)
LAUNDRY: UNABLE TO COMPLETE
ORAL_HYGIENE: PROMPTS
ORAL_HYGIENE: PROMPTS
LAUNDRY: UNABLE TO COMPLETE
HYGIENE/GROOMING: PROMPTS
HYGIENE/GROOMING: PROMPTS
DRESS: PROMPTS
DRESS: PROMPTS

## 2022-06-17 NOTE — PROGRESS NOTES
Brief Medicine Note    Contacted by nursing regarding platelet count of 124.     Platelet count has been stable at ~160 - 170 since at least January. Routine labs today revealed a platelet count of 124. Remainder of CBC is wnl.     Recent medication changes:   - Clozapine increased from 200 to 225 mg daily on 5/30   - Haldol increased from 2 to 5 mg daily on 5/30    Mild thrombocytopenia: This may be related to increased dose of clozapine. Haldol is not associated with thrombocytopenia and the doses of his other medications have been stable since at least January or February. He is not receiving any heparin products. No NSAID use. He is asymptomatic without any active bleeding or abnormal bruising.    - Recheck CBC on 6/19, or sooner if patient develops abnormal bruising or bleeding   - If platelet count continues to down-trend then will need to consider dose reduction of Clozapine     Medicine will follow CBC results peripherally. Please do not hesitate to contact if new questions or concerns arise.       Francine Couch PA-C  Hospitalist Service  Pager: [phone]

## 2022-06-17 NOTE — PROGRESS NOTES
"PSYCHIATRY  PROGRESS NOTE     DATE OF SERVICE   06/17/2022       CHIEF COMPLAINT   Patient was admitted due to inability to safely care for himself and psychosis.       SUBJECTIVE   Nursing reports:Flat affect, pt ate 25% of breakfast, not social with staff and peers, in room most of shift, out for meals. Fluids encouraged. Gait slow and steady. Pt smells of urine, pt declines to shower or change clothes at this time, will re-approach. Pt took all scheduled medication except serevent inhaler. Pt appears responding to internal stimuli, pt verbalized he is talking to his sister. Pt disoriented to date and place, pt verbalized it is \"2099\" and he is at [place]".      Patient has been discussed in detail with the  during team meeting.  We still have not received any updates from the guardianship process.     OBJECTIVE   Patient was seen and evaluated at bedside by himself, this was a face-to-face evaluation.  Patient reported that he is doing \"all right\".  He continues to demand to know when he will be leaving the hospital.  When provided information about the guardianship process he continues to have poor understanding of this.  Continues to denied all psychiatric symptoms but he remains actively hallucinating and delusional.  There has been no other concerns and the patient remains at his baseline.    Patient has a mild thrombocytopenia.  Medicine has not seen the patient and they recommended to continue monitoring the patient with the CBCs.  Please see their notes for more details.       MEDICATIONS   Medications:  Scheduled Meds:    aspirin  81 mg Oral Daily     benztropine  0.5 mg Oral BID     busPIRone HCl  30 mg Oral BID     cloZAPine  225 mg Oral At Bedtime     donepezil  10 mg Oral At Bedtime     gabapentin  100 mg Oral TID w/meals     haloperidol  2 mg Oral At Bedtime     levothyroxine  88 mcg Oral Daily     megestrol  20 mg Oral Daily     melatonin  5 mg Oral At Bedtime     memantine  10 mg Oral " "BID     mirtazapine  15 mg Oral At Bedtime     salmeterol  1 puff Inhalation BID     Continuous Infusions:  PRN Meds:.[Held by provider] acetaminophen, albuterol, alum & mag hydroxide-simethicone, benzocaine-menthol, hydrOXYzine, nicotine, nystatin, polyethylene glycol, polyethylene glycol-propylene glycol PF, senna-docusate    Medication adherence issues: MS Med Adherence Y/N: Yes, Hospitalization  Medication side effects: MEDICATION SIDE EFFECTS: no side effects reported  Benefit: Yes / No: Yes       ROS   A comprehensive review of systems was negative.       MENTAL STATUS EXAM   Vitals: BP 97/65   Pulse 95   Temp 98  F (36.7  C) (Temporal)   Resp 16   Ht 1.778 m (5' 10\")   Wt 78 kg (172 lb)   SpO2 98%   BMI 24.68 kg/m      Same as last visit  Appearance:  No apparent distress  Mood: \"I am alright.\"  Affect: Blunted  Suicidal Ideation: Denied  Homicidal Ideation: Denied  Thought process: concrete  Thought content: Remains confused and delusional.  Continues to be internally preoccupied and actively hallucinating.  Fund of Knowledge: Below average  Attention/Concentration: Poor  Language ability: Significantly impaired  Memory: Global memory impairment  Insight:  Poor.  Judgement: Poor  Orientation: Only to person  Psychomotor Behavior: slowed    Muscle Strength and Tone: MuscleStrength: Normal and Atrophy  Gait and Station: Not evaluated       LABS   personally reviewed.     No results found for: PHENYTOIN, PHENOBARB, VALPROATE, CBMZ       DIAGNOSIS   Principal Problem:    Major neurocognitive disorder, due to multiple etiologies, with behavioral disturbance, severe (H)    Active Problem List:  Patient Active Problem List   Diagnosis     TBI with aggressive behavior     HTN (hypertension)     COPD (chronic obstructive pulmonary disease) (H)     Dementia with behavioral disturbance (H)     Chronic schizophrenia (H)     Smoker     Agitation     Behavior disturbance     Psychosis (H)     Major neurocognitive " disorder, due to multiple etiologies, with behavioral disturbance, severe (H)     Paranoid schizophrenia, chronic condition with acute exacerbation (H)     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 Ongoing education given regarding diagnostic and treatment options with risks, benefits and alternatives and adequate verbalization of understanding.  2.  Medications       BuSpar 30 mg 2 times daily       Cogentin 0.5 mg 2 times a day       Clozaril 225 mg at bedtime       Aricept 10 mg at bedtime       Gabapentin 100 mg 3 times a day       Haldol 2 mg at bedtime       Namenda 10 mg 2 times a day       Remeron 15 mg at bedtime       melatonin 5 mg at bedtime  3.  Medical team following the patient   4.   coordinating a safe discharge plan with the patient.    Risk Assessment: [facility] RISK ASSESSMENT: Patient able to contract for safety    Coordination of Care:   Treatment Plan reviewed and physician signed, Care discussed with Care/Treatment Team Members, Chart reviewed and Patient seen      Re-Certification I certify that the inpatient psychiatric facility services furnished since the previous certification were, and continue to be, medically necessary for, either, treatment which could reasonably be expected to improve the patient s condition or diagnostic study and that the hospital records indicate that the services furnished were, either, intensive treatment services, admission and related services necessary for diagnostic study, or equivalent services.     I certify that the patient continues to need, on a daily basis, active treatment furnished directly by or requiring the supervision of inpatient psychiatric facility personnel.   I estimate 14 days of hospitalization is necessary for proper treatment of the patient. My plans for  post-hospital care for this patient are  TBD     Ginger Dubon MD    -     06/17/2022  -     3:37 PM    Total time 15 minutes with > 50%spent on coordination of cares and psycho-education.    This note was created with help of Dragon dictation system. Grammatical / typing errors are not intentional.    Ginger Dubon MD

## 2022-06-17 NOTE — PLAN OF CARE
Problem: Sleep Disturbance (Psychotic Signs/Symptoms)  Goal: Improved Sleep (Psychotic Signs/Symptoms)  Outcome: Ongoing, Progressing   Goal Outcome Evaluation:     Patient slept a  total of 9.5 hours without any interruptions. No behavioral issues raised the whole night.

## 2022-06-17 NOTE — PLAN OF CARE
"Goal Outcome Evaluation:    Plan of Care Reviewed With: patient     Flat affect, pt ate 25% of breakfast, not social with staff and peers, in room most of shift, out for meals. Fluids encouraged. Gait slow and steady. Pt smells of urine, pt declines to shower or change clothes at this time, will re-approach. Pt took all scheduled medication except serevent inhaler. Pt appears responding to internal stimuli, pt verbalized he is talking to his sister. Pt disoriented to date and place, pt verbalized it is \"2099\" and he is at \"[place]\".    Pt agreered to changing his scrubs, brief and bedding. Pt declines to wash. Brief was soiled.       "

## 2022-06-18 PROCEDURE — 124N000003 HC R&B MH SENIOR/ADOLESCENT

## 2022-06-18 PROCEDURE — 250N000013 HC RX MED GY IP 250 OP 250 PS 637: Performed by: PSYCHIATRY & NEUROLOGY

## 2022-06-18 RX ADMIN — HALOPERIDOL 2 MG: 1 TABLET ORAL at 20:42

## 2022-06-18 RX ADMIN — MEGESTROL ACETATE 20 MG: 20 TABLET ORAL at 08:24

## 2022-06-18 RX ADMIN — BENZTROPINE MESYLATE 0.5 MG: 0.5 TABLET ORAL at 20:42

## 2022-06-18 RX ADMIN — ASPIRIN 81 MG: 81 TABLET, COATED ORAL at 08:24

## 2022-06-18 RX ADMIN — MIRTAZAPINE 15 MG: 15 TABLET, FILM COATED ORAL at 20:42

## 2022-06-18 RX ADMIN — MEMANTINE 10 MG: 10 TABLET ORAL at 20:42

## 2022-06-18 RX ADMIN — BUSPIRONE HYDROCHLORIDE 30 MG: 15 TABLET ORAL at 08:25

## 2022-06-18 RX ADMIN — SALMETEROL XINAFOATE 1 PUFF: 50 POWDER, METERED ORAL; RESPIRATORY (INHALATION) at 08:25

## 2022-06-18 RX ADMIN — Medication 5 MG: at 20:42

## 2022-06-18 RX ADMIN — LEVOTHYROXINE SODIUM 88 MCG: 0.09 TABLET ORAL at 08:29

## 2022-06-18 RX ADMIN — CLOZAPINE 225 MG: 100 TABLET ORAL at 20:42

## 2022-06-18 RX ADMIN — MEMANTINE 10 MG: 10 TABLET ORAL at 08:25

## 2022-06-18 RX ADMIN — BUSPIRONE HYDROCHLORIDE 30 MG: 15 TABLET ORAL at 20:42

## 2022-06-18 RX ADMIN — SALMETEROL XINAFOATE 1 PUFF: 50 POWDER, METERED ORAL; RESPIRATORY (INHALATION) at 20:42

## 2022-06-18 RX ADMIN — GABAPENTIN 100 MG: 100 CAPSULE ORAL at 17:45

## 2022-06-18 RX ADMIN — GABAPENTIN 100 MG: 100 CAPSULE ORAL at 12:06

## 2022-06-18 RX ADMIN — GABAPENTIN 100 MG: 100 CAPSULE ORAL at 08:25

## 2022-06-18 RX ADMIN — DONEPEZIL HYDROCHLORIDE 10 MG: 10 TABLET ORAL at 20:42

## 2022-06-18 RX ADMIN — BENZTROPINE MESYLATE 0.5 MG: 0.5 TABLET ORAL at 08:24

## 2022-06-18 ASSESSMENT — ACTIVITIES OF DAILY LIVING (ADL)
LAUNDRY: UNABLE TO COMPLETE
DRESS: SCRUBS (BEHAVIORAL HEALTH)
DRESS: PROMPTS
HYGIENE/GROOMING: PROMPTS
ORAL_HYGIENE: PROMPTS
ORAL_HYGIENE: PROMPTS
HYGIENE/GROOMING: PROMPTS
LAUNDRY: UNABLE TO COMPLETE

## 2022-06-18 NOTE — PLAN OF CARE
Goal Outcome Evaluation:    Plan of Care Reviewed With: patient     Patient remains isolative and withdrawn. Minimal interaction and response with staff during assessment. He denies SI, SIB and hallucination. He stated he is depressed due to long hospitalization and requested his cloth to be provided to him. His affect is flat/blunted. Mood is calm. He is medication complaint. Didn't attend group. Fair appetite and his sleep is good. Continue to monitor pt. as plan of care.

## 2022-06-18 NOTE — PLAN OF CARE
Problem: Sleep Disturbance (Psychotic Signs/Symptoms)  Goal: Improved Sleep (Psychotic Signs/Symptoms)  Outcome: Ongoing, Progressing   Goal Outcome Evaluation:    Patient slept a total of 9.25  hours without any interruptions. No behavioral issues raised the whole night.

## 2022-06-18 NOTE — PLAN OF CARE
Problem: Decreased Participation and Engagement (Psychotic Signs/Symptoms)  Goal: Increased Participation and Engagement (Psychotic Signs/Symptoms)  Outcome: Ongoing, Not Progressing  Note:   No change in patient's condition. He did not engage in conversation during rounding x 3. He was able to state his birthday with delay. Asked if he could go out and have a cigarette. Remained in his room/bed. Ate 25% of supper, declined HS snack. Drinks fluid freely. Likes orange juice. He did not answer most of asked which made it difficult to assess his MH symptoms. He did not exhibit behaviors suggesting paranoia.  No suicidal behaviors, gestures, or statements. He neglected his hygiene, he appeared unkept and unclean. Declined to follow directions or accept help offered by staff. Declined to take a shower.  Patient took medications as scheduled without hesitation. Will continue with plan of care.

## 2022-06-19 LAB
ERYTHROCYTE [DISTWIDTH] IN BLOOD BY AUTOMATED COUNT: 13.9 % (ref 10–15)
HCT VFR BLD AUTO: 42.2 % (ref 40–53)
HGB BLD-MCNC: 14.6 G/DL (ref 13.3–17.7)
MCH RBC QN AUTO: 32.1 PG (ref 26.5–33)
MCHC RBC AUTO-ENTMCNC: 34.6 G/DL (ref 31.5–36.5)
MCV RBC AUTO: 93 FL (ref 78–100)
PLATELET # BLD AUTO: 170 10E3/UL (ref 150–450)
RBC # BLD AUTO: 4.55 10E6/UL (ref 4.4–5.9)
WBC # BLD AUTO: 5 10E3/UL (ref 4–11)

## 2022-06-19 PROCEDURE — 250N000013 HC RX MED GY IP 250 OP 250 PS 637: Performed by: PSYCHIATRY & NEUROLOGY

## 2022-06-19 PROCEDURE — 99207 PR NO BILLABLE SERVICE THIS VISIT: CPT | Performed by: PHYSICIAN ASSISTANT

## 2022-06-19 PROCEDURE — 124N000003 HC R&B MH SENIOR/ADOLESCENT

## 2022-06-19 PROCEDURE — 36415 COLL VENOUS BLD VENIPUNCTURE: CPT | Performed by: PHYSICIAN ASSISTANT

## 2022-06-19 PROCEDURE — 85014 HEMATOCRIT: CPT | Performed by: PHYSICIAN ASSISTANT

## 2022-06-19 RX ADMIN — LEVOTHYROXINE SODIUM 88 MCG: 0.09 TABLET ORAL at 08:13

## 2022-06-19 RX ADMIN — Medication 5 MG: at 20:21

## 2022-06-19 RX ADMIN — ASPIRIN 81 MG: 81 TABLET, COATED ORAL at 08:13

## 2022-06-19 RX ADMIN — HALOPERIDOL 2 MG: 1 TABLET ORAL at 20:20

## 2022-06-19 RX ADMIN — BENZTROPINE MESYLATE 0.5 MG: 0.5 TABLET ORAL at 08:13

## 2022-06-19 RX ADMIN — GABAPENTIN 100 MG: 100 CAPSULE ORAL at 08:13

## 2022-06-19 RX ADMIN — CLOZAPINE 225 MG: 100 TABLET ORAL at 20:20

## 2022-06-19 RX ADMIN — MIRTAZAPINE 15 MG: 15 TABLET, FILM COATED ORAL at 20:21

## 2022-06-19 RX ADMIN — MEGESTROL ACETATE 20 MG: 20 TABLET ORAL at 08:13

## 2022-06-19 RX ADMIN — BENZTROPINE MESYLATE 0.5 MG: 0.5 TABLET ORAL at 20:21

## 2022-06-19 RX ADMIN — SALMETEROL XINAFOATE 1 PUFF: 50 POWDER, METERED ORAL; RESPIRATORY (INHALATION) at 08:14

## 2022-06-19 RX ADMIN — GABAPENTIN 100 MG: 100 CAPSULE ORAL at 17:29

## 2022-06-19 RX ADMIN — GABAPENTIN 100 MG: 100 CAPSULE ORAL at 12:08

## 2022-06-19 RX ADMIN — BUSPIRONE HYDROCHLORIDE 30 MG: 15 TABLET ORAL at 08:13

## 2022-06-19 RX ADMIN — DONEPEZIL HYDROCHLORIDE 10 MG: 10 TABLET ORAL at 20:21

## 2022-06-19 RX ADMIN — MEMANTINE 10 MG: 10 TABLET ORAL at 08:13

## 2022-06-19 RX ADMIN — BUSPIRONE HYDROCHLORIDE 30 MG: 15 TABLET ORAL at 20:21

## 2022-06-19 RX ADMIN — SALMETEROL XINAFOATE 1 PUFF: 50 POWDER, METERED ORAL; RESPIRATORY (INHALATION) at 20:17

## 2022-06-19 RX ADMIN — MEMANTINE 10 MG: 10 TABLET ORAL at 20:21

## 2022-06-19 ASSESSMENT — ACTIVITIES OF DAILY LIVING (ADL)
ORAL_HYGIENE: PROMPTS
HYGIENE/GROOMING: PROMPTS
DRESS: PROMPTS
ORAL_HYGIENE: PROMPTS
LAUNDRY: UNABLE TO COMPLETE
HYGIENE/GROOMING: PROMPTS
DRESS: PROMPTS
LAUNDRY: UNABLE TO COMPLETE

## 2022-06-19 NOTE — PLAN OF CARE
Problem: Cognitive Impairment (Psychotic Signs/Symptoms)  Goal: Optimal Cognitive Function (Psychotic Signs/Symptoms)  Outcome: Ongoing, Not Progressing  Note: No change in patient's condition. Continued to isolate in his room, lying in bed most of the shift, out for supper only. Ate only few bites and drank the orange juice. He did not engage in conversation during rounding x 3. Did not answer most of asked questions which made it difficult to assess his MH symptoms. He did not exhibit behaviors suggesting paranoia.  No suicidal behaviors, gestures, or statements. Neglected his hygiene, he appeared unkept and unclean. Declined to follow directions or accept help offered by staff. Declined to take a shower again. Took medications as scheduled without hesitation. Will continue with plan of care.  Plan of Care Reviewed With: patient

## 2022-06-19 NOTE — PLAN OF CARE
Problem: Sleep Disturbance (Psychotic Signs/Symptoms)  Goal: Improved Sleep (Psychotic Signs/Symptoms)  Outcome: Ongoing, Progressing   Goal Outcome Evaluation:    Patient slept a total of 11.25 hours without any behavioral issues raised the whole shift.

## 2022-06-19 NOTE — PLAN OF CARE
Goal Outcome Evaluation:    Plan of Care Reviewed With: patient     Patient continues to be isolative and withdrawn. Came out of room for meal time only. Offers minimal response during conversation. He denies SI, SIB and hallucination. He denies anxiety and depression. His affect is flat/blunted. Mood is calm. He is medication complaint. Didn't attend group. Has fair appetite and his sleep is good. Refused to take shower. Continue to monitor pt. as plan of care.

## 2022-06-19 NOTE — PROGRESS NOTES
Brief Medicine Note    Medicine following for thrombocytopenia. Platelet count normalized on repeat CBC this morning.     No further work-up required at this time, would reconsider if thrombocytopenia is a recurrent issue.    Medicine will sign off. Please do not hesitate to contact if new questions or concerns arise.         Francine Couch PA-C  Hospitalist Service  Pager: [phone]

## 2022-06-20 PROCEDURE — 250N000013 HC RX MED GY IP 250 OP 250 PS 637: Performed by: PSYCHIATRY & NEUROLOGY

## 2022-06-20 PROCEDURE — 124N000003 HC R&B MH SENIOR/ADOLESCENT

## 2022-06-20 PROCEDURE — 99231 SBSQ HOSP IP/OBS SF/LOW 25: CPT | Performed by: PSYCHIATRY & NEUROLOGY

## 2022-06-20 RX ADMIN — MEMANTINE 10 MG: 10 TABLET ORAL at 08:44

## 2022-06-20 RX ADMIN — MEMANTINE 10 MG: 10 TABLET ORAL at 19:21

## 2022-06-20 RX ADMIN — Medication 5 MG: at 19:21

## 2022-06-20 RX ADMIN — GABAPENTIN 100 MG: 100 CAPSULE ORAL at 08:43

## 2022-06-20 RX ADMIN — BENZTROPINE MESYLATE 0.5 MG: 0.5 TABLET ORAL at 19:20

## 2022-06-20 RX ADMIN — BUSPIRONE HYDROCHLORIDE 30 MG: 15 TABLET ORAL at 08:43

## 2022-06-20 RX ADMIN — SALMETEROL XINAFOATE 1 PUFF: 50 POWDER, METERED ORAL; RESPIRATORY (INHALATION) at 08:44

## 2022-06-20 RX ADMIN — HALOPERIDOL 2 MG: 1 TABLET ORAL at 19:20

## 2022-06-20 RX ADMIN — LEVOTHYROXINE SODIUM 88 MCG: 0.09 TABLET ORAL at 08:43

## 2022-06-20 RX ADMIN — BENZTROPINE MESYLATE 0.5 MG: 0.5 TABLET ORAL at 08:43

## 2022-06-20 RX ADMIN — CLOZAPINE 225 MG: 100 TABLET ORAL at 19:21

## 2022-06-20 RX ADMIN — GABAPENTIN 100 MG: 100 CAPSULE ORAL at 13:29

## 2022-06-20 RX ADMIN — MEGESTROL ACETATE 20 MG: 20 TABLET ORAL at 08:43

## 2022-06-20 RX ADMIN — ASPIRIN 81 MG: 81 TABLET, COATED ORAL at 08:42

## 2022-06-20 RX ADMIN — SALMETEROL XINAFOATE 1 PUFF: 50 POWDER, METERED ORAL; RESPIRATORY (INHALATION) at 19:21

## 2022-06-20 RX ADMIN — MIRTAZAPINE 15 MG: 15 TABLET, FILM COATED ORAL at 19:20

## 2022-06-20 RX ADMIN — DONEPEZIL HYDROCHLORIDE 10 MG: 10 TABLET ORAL at 19:21

## 2022-06-20 RX ADMIN — BUSPIRONE HYDROCHLORIDE 30 MG: 15 TABLET ORAL at 19:20

## 2022-06-20 RX ADMIN — GABAPENTIN 100 MG: 100 CAPSULE ORAL at 17:21

## 2022-06-20 ASSESSMENT — ACTIVITIES OF DAILY LIVING (ADL)
HYGIENE/GROOMING: PROMPTS
LAUNDRY: UNABLE TO COMPLETE
ORAL_HYGIENE: PROMPTS
DRESS: SCRUBS (BEHAVIORAL HEALTH);INDEPENDENT
HYGIENE/GROOMING: PROMPTS
DRESS: SCRUBS (BEHAVIORAL HEALTH)
ORAL_HYGIENE: PROMPTS
LAUNDRY: UNABLE TO COMPLETE

## 2022-06-20 NOTE — PROGRESS NOTES
PSYCHIATRY  PROGRESS NOTE     DATE OF SERVICE   06/20/2022       CHIEF COMPLAINT   Patient was admitted due to inability to safely care for himself and psychosis.       SUBJECTIVE   Nursing reports:  Patient was observed to be talking to self. When approached, he said he is talking to someone and does not want to be disturbed. He said he does not understand how come he is not allowed to smoke here. Speech was slurred and difficult to understand. He isolated in his room for the entire shift. Did not come out for dinner, did not eat any of the food on his dinner tray.   He did not engage in meaningful interactions with staff. Did not answer most of asked questions which made it difficult to assess his MH symptoms. He did not exhibit behaviors suggesting paranoia.  No suicidal behaviors, gestures, or statements. Patient was unable to retain information or to participate in learning activities. He does not attend groups or unit activities.  Neglected his hygiene, he appeared disheveled, unkept, and unclean. Declined to follow directions or accept help offered by staff. Declined to take a shower again. Took medications as scheduled without hesitation. Will continue with plan of care.     Patient has been discussed in detail with the  during team meeting.  Patient has a recommitment hearing today.     OBJECTIVE   Patient was seen and evaluated at bedside by himself, this was a face-to-face evaluation.  Patient presented as calm but disengaged and not able to answer most of my questions.  Patient reported that he is feeling tired.  He was seen staring at the window talking to himself and he did not wanted to be interrupted.  He continues to denied all psychiatric symptoms and has poor understanding of the reasons for him being in the hospital.  He has been asking when he will be leaving the hospital but does not understand that guardianship or commitment process.       MEDICATIONS   Medications:  Scheduled  "Meds:    aspirin  81 mg Oral Daily     benztropine  0.5 mg Oral BID     busPIRone HCl  30 mg Oral BID     cloZAPine  225 mg Oral At Bedtime     donepezil  10 mg Oral At Bedtime     gabapentin  100 mg Oral TID w/meals     haloperidol  2 mg Oral At Bedtime     levothyroxine  88 mcg Oral Daily     megestrol  20 mg Oral Daily     melatonin  5 mg Oral At Bedtime     memantine  10 mg Oral BID     mirtazapine  15 mg Oral At Bedtime     salmeterol  1 puff Inhalation BID     Continuous Infusions:  PRN Meds:.[Held by provider] acetaminophen, albuterol, alum & mag hydroxide-simethicone, benzocaine-menthol, hydrOXYzine, nicotine, nystatin, polyethylene glycol, polyethylene glycol-propylene glycol PF, senna-docusate    Medication adherence issues: MS Med Adherence Y/N: Yes, Hospitalization  Medication side effects: MEDICATION SIDE EFFECTS: no side effects reported  Benefit: Yes / No: Yes       ROS   A comprehensive review of systems was negative.       MENTAL STATUS EXAM   Vitals: /87 (Patient Position: Sitting, Cuff Size: Adult Regular)   Pulse 105   Temp 97.4  F (36.3  C) (Temporal)   Resp 16   Ht 1.778 m (5' 10\")   Wt 78 kg (172 lb)   SpO2 98%   BMI 24.68 kg/m        Appearance:  No apparent distress  Mood: \"I am tired.\"  Affect: Blunted  Suicidal Ideation: Denied  Homicidal Ideation: Denied  Thought process: concrete  Thought content: Remains confused and delusional.  Continues to be internally preoccupied and actively hallucinating.  Fund of Knowledge: Below average  Attention/Concentration: Poor  Language ability: Significantly impaired  Memory: Global memory impairment  Insight:  Poor.  Judgement: Poor  Orientation: Only to person  Psychomotor Behavior: slowed    Muscle Strength and Tone: MuscleStrength: Normal and Atrophy  Gait and Station: Not evaluated       LABS   personally reviewed.     No results found for: PHENYTOIN, PHENOBARB, VALPROATE, CBMZ       DIAGNOSIS   Principal Problem:    Major " neurocognitive disorder, due to multiple etiologies, with behavioral disturbance, severe (H)    Active Problem List:  Patient Active Problem List   Diagnosis     TBI with aggressive behavior     HTN (hypertension)     COPD (chronic obstructive pulmonary disease) (H)     Dementia with behavioral disturbance (H)     Chronic schizophrenia (H)     Smoker     Agitation     Behavior disturbance     Psychosis (H)     Major neurocognitive disorder, due to multiple etiologies, with behavioral disturbance, severe (H)     Paranoid schizophrenia, chronic condition with acute exacerbation (H)     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 Ongoing education given regarding diagnostic and treatment options with risks, benefits and alternatives and adequate verbalization of understanding.  2.  Medications       BuSpar 30 mg 2 times daily       Cogentin 0.5 mg 2 times a day       Clozaril 225 mg at bedtime       Aricept 10 mg at bedtime       Gabapentin 100 mg 3 times a day       Haldol 2 mg at bedtime       Namenda 10 mg 2 times a day       Remeron 15 mg at bedtime       melatonin 5 mg at bedtime  3.  Medical team following the patient   4.   coordinating a safe discharge plan with the patient.    CBC today is within normal limits.  Risk Assessment: [facility] RISK ASSESSMENT: Patient able to contract for safety    Coordination of Care:   Treatment Plan reviewed and physician signed, Care discussed with Care/Treatment Team Members, Chart reviewed and Patient seen      Re-Certification I certify that the inpatient psychiatric facility services furnished since the previous certification were, and continue to be, medically necessary for, either, treatment which could reasonably be expected to improve the patient s condition or diagnostic study and that  the hospital records indicate that the services furnished were, either, intensive treatment services, admission and related services necessary for diagnostic study, or equivalent services.     I certify that the patient continues to need, on a daily basis, active treatment furnished directly by or requiring the supervision of inpatient psychiatric facility personnel.   I estimate 14 days of hospitalization is necessary for proper treatment of the patient. My plans for post-hospital care for this patient are  TBD     Ginger Dubon MD    -     06/20/2022  -     12:56 PM    Total time 15 minutes with > 50%spent on coordination of cares and psycho-education.    This note was created with help of Dragon dictation system. Grammatical / typing errors are not intentional.    Ginger Dubon MD

## 2022-06-20 NOTE — PROVIDER NOTIFICATION
06/20/22 0620   Sleep/Rest   Sleep/Rest/Relaxation no problem identified   Sleep Hygiene Promotion awakenings minimized   Night Time # Hours 7 hours     NOC Shift Report    Pt in bed at beginning of shift, breathing quiet and unlabored. Pt slept through shift. Pt slept 7 hours.     No pt complaints or concerns at this time. No PRNs given. Will continue to monitor.

## 2022-06-20 NOTE — PLAN OF CARE
Goal Outcome Evaluation:    Plan of Care Reviewed With: patient      Patient stayed in room most of the time this shift sleeping.  Up to the dinning room this morning for breakfast with fair appetite.  Patient kept mumbling to self when staff talked to him.  However, took scheduled medications with no problem.  Patient showered this morning and his room was cleaned.  Patient ate lunch in room, no aggressive behavior noted.  Will continue to monitor closely.

## 2022-06-20 NOTE — PLAN OF CARE
Problem: Sensory Perception Impairment (Psychotic Signs/Symptoms)  Goal: Decreased Sensory Symptoms (Psychotic Signs/Symptoms)  Outcome: Ongoing, Not Progressing  Note:     Patient was observed to be talking to self. When approached, he said he is talking to someone and does not want to be disturbed. He said he does not understand how come he is not allowed to smoke here. Speech was slurred and difficult to understand. He isolated in his room for the entire shift. Did not come out for dinner, did not eat any of the food on his dinner tray.   He did not engage in meaningful interactions with staff. Did not answer most of asked questions which made it difficult to assess his MH symptoms. He did not exhibit behaviors suggesting paranoia.  No suicidal behaviors, gestures, or statements. Patient was unable to retain information or to participate in learning activities. He does not attend groups or unit activities.  Neglected his hygiene, he appeared disheveled, unkept, and unclean. Declined to follow directions or accept help offered by staff. Declined to take a shower again. Took medications as scheduled without hesitation. Will continue with plan of care.   Goal Outcome Evaluation:    Plan of Care Reviewed With: patient

## 2022-06-20 NOTE — PLAN OF CARE
06/20/22 1650   Individualization/Patient Specific Goals   Patient Personal Strengths resilient   Patient Vulnerabilities lacks insight into illness   Anxieties, Fears or Concerns Patient lacks insight into his illness. He is concerned about discharging to his mansion and would like to smoke.   Individualized Care Needs Patient is on a commitment/[place] and is waiting for court ruling on guardianship. He is also waiting for court ruling on a re-commitment.   Patient-Specific Goals (Include Timeframe) Symptom reduction and stabilization, waiting for guardianship to be finalized, and patient can discharge to assisted living.   Interprofessional Rounds   Summary Patient continues to stabilize at [place]. He is not able to care for himself, needs help with all ADLs. Team continues to wait for court ruling on guardianship.   Participants milieu/psych techs;nursing;psychiatrist;CTC   Team Discussion   Participants Dr. Root, [facility] - Winnie, RN - Jesus, [facility] - Bill   Progress Making progress   Anticipated length of stay 30 days   Continued Stay Criteria/Rationale Patient is undergoing re-commitment, awaiting guardianship ruling, is not able to care for myself, has no family to discharge to, is a vulnerable adult.   Medical/Physical Please see H and P   Precautions Fall   Plan Continue with plan: medication management, psychiatric evaluations, nursing assessments, group therapy, milieu therapy, and CTC case management. Patient has been accepted to assisted living, waiting for guardianship to be finalized, and then patient can receive [place] Assessment for funding, and patient will be able to discharge.   Safety Plan code 2, code 3, s-15, comm pittman, waiting on guardianship ruling   Anticipated Discharge Disposition assisted living     PRECAUTIONS AND SAFETY    Behavioral Orders   Procedures    Code 1 - Restrict to Unit    Code 2     CT scan only    Code 3     Patient can go out of the unit with staff  supervision. He needs to be taken out by him self with 2 staff and security present.    Fall precautions    Routine Programming     As clinically indicated    Status 15     Every 15 minutes.       Safety  Safety WDL: WDL  Patient Location: patient room, own  Observed Behavior: calm  Observed Behavior (Comment): none  Safety Measures: safety plan reviewed  Diversional Activity: television  Suicidality: Status 15  Seizure precautions: calm, consistent lighting  Assault: status 15  Elopement: Statements about wanting to leave  Elopement: status 15  Sexual: status 15

## 2022-06-20 NOTE — PLAN OF CARE
Care coordination:  - Patient's care was discussed in team meeting.  - Writer spoke with Margot from [facility]. Margot stated the RN will contact writer to schedule intake interview with patient.         Referrals:  - [facility]  - New referral for [facility].     Barriers to discharge:  - Guardianship hearing court findings (waiting on the ), placement, and funding barriers.

## 2022-06-21 PROCEDURE — 250N000013 HC RX MED GY IP 250 OP 250 PS 637: Performed by: PSYCHIATRY & NEUROLOGY

## 2022-06-21 PROCEDURE — 99231 SBSQ HOSP IP/OBS SF/LOW 25: CPT | Performed by: PSYCHIATRY & NEUROLOGY

## 2022-06-21 PROCEDURE — 124N000003 HC R&B MH SENIOR/ADOLESCENT

## 2022-06-21 RX ADMIN — BUSPIRONE HYDROCHLORIDE 30 MG: 15 TABLET ORAL at 20:42

## 2022-06-21 RX ADMIN — BENZTROPINE MESYLATE 0.5 MG: 0.5 TABLET ORAL at 08:29

## 2022-06-21 RX ADMIN — CLOZAPINE 225 MG: 100 TABLET ORAL at 20:42

## 2022-06-21 RX ADMIN — SALMETEROL XINAFOATE 1 PUFF: 50 POWDER, METERED ORAL; RESPIRATORY (INHALATION) at 20:44

## 2022-06-21 RX ADMIN — MIRTAZAPINE 15 MG: 15 TABLET, FILM COATED ORAL at 20:43

## 2022-06-21 RX ADMIN — HALOPERIDOL 2 MG: 1 TABLET ORAL at 20:42

## 2022-06-21 RX ADMIN — SALMETEROL XINAFOATE 1 PUFF: 50 POWDER, METERED ORAL; RESPIRATORY (INHALATION) at 08:29

## 2022-06-21 RX ADMIN — MEMANTINE 10 MG: 10 TABLET ORAL at 08:29

## 2022-06-21 RX ADMIN — DONEPEZIL HYDROCHLORIDE 10 MG: 10 TABLET ORAL at 20:42

## 2022-06-21 RX ADMIN — GABAPENTIN 100 MG: 100 CAPSULE ORAL at 08:29

## 2022-06-21 RX ADMIN — MEGESTROL ACETATE 20 MG: 20 TABLET ORAL at 08:29

## 2022-06-21 RX ADMIN — ASPIRIN 81 MG: 81 TABLET, COATED ORAL at 08:29

## 2022-06-21 RX ADMIN — LEVOTHYROXINE SODIUM 88 MCG: 0.09 TABLET ORAL at 08:29

## 2022-06-21 RX ADMIN — BUSPIRONE HYDROCHLORIDE 30 MG: 15 TABLET ORAL at 08:29

## 2022-06-21 RX ADMIN — MEMANTINE 10 MG: 10 TABLET ORAL at 20:42

## 2022-06-21 RX ADMIN — GABAPENTIN 100 MG: 100 CAPSULE ORAL at 12:51

## 2022-06-21 RX ADMIN — BENZTROPINE MESYLATE 0.5 MG: 0.5 TABLET ORAL at 20:43

## 2022-06-21 RX ADMIN — Medication 5 MG: at 20:42

## 2022-06-21 RX ADMIN — GABAPENTIN 100 MG: 100 CAPSULE ORAL at 17:55

## 2022-06-21 ASSESSMENT — ACTIVITIES OF DAILY LIVING (ADL)
DRESS: SCRUBS (BEHAVIORAL HEALTH)
ORAL_HYGIENE: PROMPTS
LAUNDRY: UNABLE TO COMPLETE
LAUNDRY: UNABLE TO COMPLETE
DRESS: SCRUBS (BEHAVIORAL HEALTH)
ORAL_HYGIENE: PROMPTS
HYGIENE/GROOMING: PROMPTS
HYGIENE/GROOMING: PROMPTS

## 2022-06-21 NOTE — PROGRESS NOTES
Pt appeared to be sleeping comfortably. A total of about 9.5 hours. No concerns noted or reported this shift.

## 2022-06-21 NOTE — PLAN OF CARE
Problem: Cognitive Impairment (Psychotic Signs/Symptoms)  Goal: Optimal Cognitive Function (Psychotic Signs/Symptoms)  Outcome: Ongoing, Not Progressing  Intervention: Support and Promote Cognitive Ability  Recent Flowsheet Documentation  Taken 6/21/2022 1010 by Juarez Murphy RN  Trust Relationship/Rapport: care explained     Problem: Decreased Participation and Engagement (Psychotic Signs/Symptoms)  Goal: Increased Participation and Engagement (Psychotic Signs/Symptoms)  Outcome: Ongoing, Not Progressing     Problem: Mood Impairment (Psychotic Signs/Symptoms)  Goal: Improved Mood Symptoms (Psychotic Signs/Symptoms)  Outcome: Ongoing, Not Progressing   Goal Outcome Evaluation:    Plan of Care Reviewed With: patient     Patient is calm isolative and withdrawn. Denies all MH concerns, alert to self. Ate 50% of meals, ate breakfast in room, but came out for lunch. Not social with peers, did not attend groups. Denies pain or any other concerns.

## 2022-06-21 NOTE — PLAN OF CARE
Problem: Behavior Regulation Impairment (Psychotic Signs/Symptoms)  Goal: Improved Behavioral Control (Psychotic Signs/Symptoms)  Outcome: Ongoing, Not Progressing   Goal Outcome Evaluation:    Plan of Care Reviewed With: patient     Patient remains isolative to his room and withdrawn. He came out for dinner then went back again to his room. He only ate about 10-15% of his dinner. He said that he is not hungry. He is encouraged to drink more fluids. He drank half a glass of orange juice. Patient's speech can't be understood at times because he either mumbles or whispers. Gets irritated when one repeats the questions whenever he is not understood. Patient's mood is calm with a flat affect. Patient denies SI/SIB nor hallucinations. Talking to self not observed. He is napping majority of the shift. He looks unkempt with long beard and mustache. He declined to take a shower this evening. He refused to attend group activities both this afternoon and after dinner. He said he is tired and preferred to nap. Patient took all his oral bedtime medications.

## 2022-06-21 NOTE — PROGRESS NOTES
PSYCHIATRY  PROGRESS NOTE     DATE OF SERVICE   06/21/2022       CHIEF COMPLAINT   Patient was admitted due to inability to safely care for himself and psychosis.       SUBJECTIVE   Nursing reports: Patient remains isolative to his room and withdrawn. He came out for dinner then went back again to his room. He only ate about 10-15% of his dinner. He said that he is not hungry. He is encouraged to drink more fluids. He drank half a glass of orange juice. Patient's speech can't be understood at times because he either mumbles or whispers. Gets irritated when one repeats the questions whenever he is not understood. Patient's mood is calm with a flat affect. Patient denies SI/SIB nor hallucinations. Talking to self not observed. He is napping majority of the shift. He looks unkempt with long beard and mustache. He declined to take a shower this evening. He refused to attend group activities both this afternoon and after dinner. He said he is tired and preferred to nap. Patient took all his oral bedtime medications.    Patient has been discussed in detail with the  during team meeting.  We still have not received any updates from the guardianship process.     OBJECTIVE   Patient was seen and evaluated at bedside by himself, this was a face-to-face evaluation.  Patient continues to present at his baseline.  Patient is denying any thoughts of harming himself.  Continues to talk to himself, has auditory hallucinations and multiple grandiose delusions.  He does not understand the commitment or guardianship process.       MEDICATIONS   Medications:  Scheduled Meds:    aspirin  81 mg Oral Daily     benztropine  0.5 mg Oral BID     busPIRone HCl  30 mg Oral BID     cloZAPine  225 mg Oral At Bedtime     donepezil  10 mg Oral At Bedtime     gabapentin  100 mg Oral TID w/meals     haloperidol  2 mg Oral At Bedtime     levothyroxine  88 mcg Oral Daily     megestrol  20 mg Oral Daily     melatonin  5 mg Oral At Bedtime  "    memantine  10 mg Oral BID     mirtazapine  15 mg Oral At Bedtime     salmeterol  1 puff Inhalation BID     Continuous Infusions:  PRN Meds:.[Held by provider] acetaminophen, albuterol, alum & mag hydroxide-simethicone, benzocaine-menthol, hydrOXYzine, nicotine, nystatin, polyethylene glycol, polyethylene glycol-propylene glycol PF, senna-docusate    Medication adherence issues: MS Med Adherence Y/N: Yes, Hospitalization  Medication side effects: MEDICATION SIDE EFFECTS: no side effects reported  Benefit: Yes / No: Yes       ROS   A comprehensive review of systems was negative.       MENTAL STATUS EXAM   Vitals: /84   Pulse 86   Temp 98  F (36.7  C) (Temporal)   Resp 16   Ht 1.778 m (5' 10\")   Wt 77.1 kg (170 lb)   SpO2 100%   BMI 24.39 kg/m      Same as last visit.  Appearance:  No apparent distress  Mood: \"I am tired.\"  Affect: Blunted  Suicidal Ideation: Denied  Homicidal Ideation: Denied  Thought process: concrete  Thought content: Remains confused and delusional.  Continues to be internally preoccupied and actively hallucinating.  Fund of Knowledge: Below average  Attention/Concentration: Poor  Language ability: Significantly impaired  Memory: Global memory impairment  Insight:  Poor.  Judgement: Poor  Orientation: Only to person  Psychomotor Behavior: slowed    Muscle Strength and Tone: MuscleStrength: Normal and Atrophy  Gait and Station: Not evaluated       LABS   personally reviewed.     No results found for: PHENYTOIN, PHENOBARB, VALPROATE, CBMZ       DIAGNOSIS   Principal Problem:    Major neurocognitive disorder, due to multiple etiologies, with behavioral disturbance, severe (H)    Active Problem List:  Patient Active Problem List   Diagnosis     TBI with aggressive behavior     HTN (hypertension)     COPD (chronic obstructive pulmonary disease) (H)     Dementia with behavioral disturbance (H)     Chronic schizophrenia (H)     Smoker     Agitation     Behavior disturbance     Psychosis " (H)     Major neurocognitive disorder, due to multiple etiologies, with behavioral disturbance, severe (H)     Paranoid schizophrenia, chronic condition with acute exacerbation (H)     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 Ongoing education given regarding diagnostic and treatment options with risks, benefits and alternatives and adequate verbalization of understanding.  2.  Medications       BuSpar 30 mg 2 times daily       Cogentin 0.5 mg 2 times a day       Clozaril 225 mg at bedtime       Aricept 10 mg at bedtime       Gabapentin 100 mg 3 times a day       Haldol 2 mg at bedtime       Namenda 10 mg 2 times a day       Remeron 15 mg at bedtime       melatonin 5 mg at bedtime  3.  Medical team following the patient   4.   coordinating a safe discharge plan with the patient.    CBC today is within normal limits.  Risk Assessment: [facility] RISK ASSESSMENT: Patient able to contract for safety    Coordination of Care:   Treatment Plan reviewed and physician signed, Care discussed with Care/Treatment Team Members, Chart reviewed and Patient seen      Re-Certification I certify that the inpatient psychiatric facility services furnished since the previous certification were, and continue to be, medically necessary for, either, treatment which could reasonably be expected to improve the patient s condition or diagnostic study and that the hospital records indicate that the services furnished were, either, intensive treatment services, admission and related services necessary for diagnostic study, or equivalent services.     I certify that the patient continues to need, on a daily basis, active treatment furnished directly by or requiring the supervision of inpatient psychiatric facility personnel.   I estimate 14 days of  hospitalization is necessary for proper treatment of the patient. My plans for post-hospital care for this patient are  TBD     Ginger Dubon MD    -     06/21/2022  -     2:45 PM    Total time 15 minutes with > 50%spent on coordination of cares and psycho-education.    This note was created with help of Dragon dictation system. Grammatical / typing errors are not intentional.    Ginger Dubon MD

## 2022-06-22 PROCEDURE — 250N000013 HC RX MED GY IP 250 OP 250 PS 637: Performed by: PSYCHIATRY & NEUROLOGY

## 2022-06-22 PROCEDURE — 124N000003 HC R&B MH SENIOR/ADOLESCENT

## 2022-06-22 RX ADMIN — SALMETEROL XINAFOATE 1 PUFF: 50 POWDER, METERED ORAL; RESPIRATORY (INHALATION) at 08:36

## 2022-06-22 RX ADMIN — CLOZAPINE 225 MG: 100 TABLET ORAL at 21:10

## 2022-06-22 RX ADMIN — BUSPIRONE HYDROCHLORIDE 30 MG: 15 TABLET ORAL at 08:35

## 2022-06-22 RX ADMIN — MEMANTINE 10 MG: 10 TABLET ORAL at 08:35

## 2022-06-22 RX ADMIN — MEGESTROL ACETATE 20 MG: 20 TABLET ORAL at 08:36

## 2022-06-22 RX ADMIN — LEVOTHYROXINE SODIUM 88 MCG: 0.09 TABLET ORAL at 08:36

## 2022-06-22 RX ADMIN — BENZTROPINE MESYLATE 0.5 MG: 0.5 TABLET ORAL at 08:35

## 2022-06-22 RX ADMIN — SALMETEROL XINAFOATE 1 PUFF: 50 POWDER, METERED ORAL; RESPIRATORY (INHALATION) at 21:13

## 2022-06-22 RX ADMIN — BUSPIRONE HYDROCHLORIDE 30 MG: 15 TABLET ORAL at 21:10

## 2022-06-22 RX ADMIN — ASPIRIN 81 MG: 81 TABLET, COATED ORAL at 08:35

## 2022-06-22 RX ADMIN — GABAPENTIN 100 MG: 100 CAPSULE ORAL at 18:13

## 2022-06-22 RX ADMIN — HALOPERIDOL 2 MG: 1 TABLET ORAL at 21:10

## 2022-06-22 RX ADMIN — DONEPEZIL HYDROCHLORIDE 10 MG: 10 TABLET ORAL at 21:10

## 2022-06-22 RX ADMIN — MEMANTINE 10 MG: 10 TABLET ORAL at 21:10

## 2022-06-22 RX ADMIN — GABAPENTIN 100 MG: 100 CAPSULE ORAL at 08:35

## 2022-06-22 RX ADMIN — BENZTROPINE MESYLATE 0.5 MG: 0.5 TABLET ORAL at 21:10

## 2022-06-22 RX ADMIN — GABAPENTIN 100 MG: 100 CAPSULE ORAL at 12:44

## 2022-06-22 RX ADMIN — MIRTAZAPINE 15 MG: 15 TABLET, FILM COATED ORAL at 21:10

## 2022-06-22 RX ADMIN — Medication 5 MG: at 21:12

## 2022-06-22 ASSESSMENT — ACTIVITIES OF DAILY LIVING (ADL)
HYGIENE/GROOMING: INDEPENDENT
ORAL_HYGIENE: INDEPENDENT
DRESS: SCRUBS (BEHAVIORAL HEALTH);INDEPENDENT

## 2022-06-22 NOTE — PLAN OF CARE
Problem: Sleep Disturbance (Psychotic Signs/Symptoms)  Goal: Improved Sleep (Psychotic Signs/Symptoms)  Outcome: Ongoing, Progressing   Goal Outcome Evaluation:    Patient slept a total of 9 hours. No behavioral issues raised the whole shift.

## 2022-06-22 NOTE — PROGRESS NOTES
Patient was seen and evaluated at bedside.  Remains at his baseline and no new behaviors have been reported.    Ginger Dubon MD

## 2022-06-22 NOTE — PLAN OF CARE
"  Problem: Cognitive Impairment (Psychotic Signs/Symptoms)  Goal: Optimal Cognitive Function (Psychotic Signs/Symptoms)  Intervention: Support and Promote Cognitive Ability  Recent Flowsheet Documentation  Taken 6/21/2022 1900 by Izzy Gallego RN  Trust Relationship/Rapport:   care explained   choices provided   emotional support provided   empathic listening provided   questions answered   questions encouraged   reassurance provided   thoughts/feelings acknowledged   Goal Outcome Evaluation:     Plan of Care Reviewed With: patient              Patient remains oriented to self only. Calm and cooperative. Denies anxiety, depression, SI, SIB and hallucination but occasionally appears as like responding to internal stimuli.   Reported feeling \"Fine\".   "

## 2022-06-22 NOTE — PLAN OF CARE
"  Problem: Cognitive Impairment (Psychotic Signs/Symptoms)  Goal: Optimal Cognitive Function (Psychotic Signs/Symptoms)  Outcome: Ongoing, Not Progressing  Intervention: Support and Promote Cognitive Ability  Recent Flowsheet Documentation  Taken 6/22/2022 1051 by Juarez Murphy RN  Trust Relationship/Rapport: care explained     Problem: Decreased Participation and Engagement (Psychotic Signs/Symptoms)  Goal: Increased Participation and Engagement (Psychotic Signs/Symptoms)  Outcome: Ongoing, Not Progressing     Problem: Mood Impairment (Psychotic Signs/Symptoms)  Goal: Improved Mood Symptoms (Psychotic Signs/Symptoms)  Outcome: Ongoing, Not Progressing   Goal Outcome Evaluation:    Plan of Care Reviewed With: patient     Patient is calm, medication compliant. Unable to participate in MH checking stating \"Im tired\". Ate 0% of breakfast and 50% of lunch. Did not attend groups, not social with peers. No new concerns.  "

## 2022-06-23 PROCEDURE — 250N000013 HC RX MED GY IP 250 OP 250 PS 637: Performed by: PSYCHIATRY & NEUROLOGY

## 2022-06-23 PROCEDURE — 99231 SBSQ HOSP IP/OBS SF/LOW 25: CPT | Performed by: PSYCHIATRY & NEUROLOGY

## 2022-06-23 PROCEDURE — 124N000003 HC R&B MH SENIOR/ADOLESCENT

## 2022-06-23 RX ADMIN — DONEPEZIL HYDROCHLORIDE 10 MG: 10 TABLET ORAL at 19:25

## 2022-06-23 RX ADMIN — MIRTAZAPINE 15 MG: 15 TABLET, FILM COATED ORAL at 19:25

## 2022-06-23 RX ADMIN — SALMETEROL XINAFOATE 1 PUFF: 50 POWDER, METERED ORAL; RESPIRATORY (INHALATION) at 19:25

## 2022-06-23 RX ADMIN — GABAPENTIN 100 MG: 100 CAPSULE ORAL at 08:02

## 2022-06-23 RX ADMIN — BUSPIRONE HYDROCHLORIDE 30 MG: 15 TABLET ORAL at 19:25

## 2022-06-23 RX ADMIN — HALOPERIDOL 2 MG: 1 TABLET ORAL at 19:25

## 2022-06-23 RX ADMIN — SALMETEROL XINAFOATE 1 PUFF: 50 POWDER, METERED ORAL; RESPIRATORY (INHALATION) at 07:57

## 2022-06-23 RX ADMIN — Medication 5 MG: at 19:25

## 2022-06-23 RX ADMIN — BENZTROPINE MESYLATE 0.5 MG: 0.5 TABLET ORAL at 08:02

## 2022-06-23 RX ADMIN — MEGESTROL ACETATE 20 MG: 20 TABLET ORAL at 08:02

## 2022-06-23 RX ADMIN — ASPIRIN 81 MG: 81 TABLET, COATED ORAL at 08:02

## 2022-06-23 RX ADMIN — LEVOTHYROXINE SODIUM 88 MCG: 0.09 TABLET ORAL at 08:02

## 2022-06-23 RX ADMIN — MEMANTINE 10 MG: 10 TABLET ORAL at 08:02

## 2022-06-23 RX ADMIN — BENZTROPINE MESYLATE 0.5 MG: 0.5 TABLET ORAL at 19:25

## 2022-06-23 RX ADMIN — BUSPIRONE HYDROCHLORIDE 30 MG: 15 TABLET ORAL at 08:02

## 2022-06-23 RX ADMIN — GABAPENTIN 100 MG: 100 CAPSULE ORAL at 17:26

## 2022-06-23 RX ADMIN — CLOZAPINE 225 MG: 100 TABLET ORAL at 19:25

## 2022-06-23 RX ADMIN — MEMANTINE 10 MG: 10 TABLET ORAL at 19:25

## 2022-06-23 RX ADMIN — GABAPENTIN 100 MG: 100 CAPSULE ORAL at 12:24

## 2022-06-23 ASSESSMENT — ACTIVITIES OF DAILY LIVING (ADL)
LAUNDRY: UNABLE TO COMPLETE
ORAL_HYGIENE: PROMPTS
DRESS: SCRUBS (BEHAVIORAL HEALTH)
DRESS: SCRUBS (BEHAVIORAL HEALTH);INDEPENDENT
HYGIENE/GROOMING: PROMPTS
ORAL_HYGIENE: PROMPTS
LAUNDRY: UNABLE TO COMPLETE
HYGIENE/GROOMING: PROMPTS

## 2022-06-23 NOTE — PLAN OF CARE
Problem: Depression  Goal: Improved Mood  Outcome: Ongoing, Not Progressing     Pt presented as oriented to only self throughout shift.  He was withdrawn and isolative to his room for the entire evening.  Pt was dressed appropriately, however he was malodorous and disheveled.  He was compliant with his medications this evening and did not require or request any PRNs. Pt's speech and movements appear slow and delayed.  He minimally responded to nursing assessment questions, and when he did, he denied all symptoms of psychosis, pain, or side effects to medications this shift.

## 2022-06-23 NOTE — PROGRESS NOTES
PSYCHIATRY  PROGRESS NOTE     DATE OF SERVICE   06/23/2022       CHIEF COMPLAINT   Patient was admitted due to inability to safely care for himself and psychosis.       SUBJECTIVE   Nursing reports:   Pt presented as oriented to only self throughout shift.  He was withdrawn and isolative to his room for the entire evening.  Pt was dressed appropriately, however he was malodorous and disheveled.  He was compliant with his medications this evening and did not require or request any PRNs. Pt's speech and movements appear slow and delayed.  He minimally responded to nursing assessment questions, and when he did, he denied all symptoms of psychosis, pain, or side effects to medications this shift.     Patient has been discussed in detail with the  during team meeting.  Patient's commitment was renewed.  We still have not received any updates from the guardianship process.     OBJECTIVE   Patient was seen and evaluated at bedside by himself, this was a face-to-face evaluation.  Patient reported that he is doing alright but he thinks that other people are talking about him.  I informed the patient that no other patients is talking about him and he is safe in the unit.  Patient remains confused and he was difficult to assess if the patient had any understanding of the information provided to him.  Patient again asks when he will be leaving the hospital.  I again reminded the patient of the guardianship process but the patient was dismissive.  He continues to denied all psychiatric symptoms and denies any thoughts of harming himself.       MEDICATIONS   Medications:  Scheduled Meds:    aspirin  81 mg Oral Daily     benztropine  0.5 mg Oral BID     busPIRone HCl  30 mg Oral BID     cloZAPine  225 mg Oral At Bedtime     donepezil  10 mg Oral At Bedtime     gabapentin  100 mg Oral TID w/meals     haloperidol  2 mg Oral At Bedtime     levothyroxine  88 mcg Oral Daily     megestrol  20 mg Oral Daily     melatonin  5  "mg Oral At Bedtime     memantine  10 mg Oral BID     mirtazapine  15 mg Oral At Bedtime     salmeterol  1 puff Inhalation BID     Continuous Infusions:  PRN Meds:.[Held by provider] acetaminophen, albuterol, alum & mag hydroxide-simethicone, benzocaine-menthol, hydrOXYzine, nicotine, nystatin, polyethylene glycol, polyethylene glycol-propylene glycol PF, senna-docusate    Medication adherence issues: MS Med Adherence Y/N: Yes, Hospitalization  Medication side effects: MEDICATION SIDE EFFECTS: no side effects reported  Benefit: Yes / No: Yes       ROS   A comprehensive review of systems was negative.       MENTAL STATUS EXAM   Vitals: /88 (BP Location: Right arm, Patient Position: Supine)   Pulse 93   Temp (!) 96.3  F (35.7  C) (Oral)   Resp 16   Ht 1.778 m (5' 10\")   Wt 77.1 kg (170 lb)   SpO2 99%   BMI 24.39 kg/m      Appearance:  No apparent distress  Mood: \"I am alright.\"  Affect: Blunted  Suicidal Ideation: Denied  Homicidal Ideation: Denied  Thought process: concrete  Thought content: Remains confused and delusional.  Continues to be internally preoccupied and actively hallucinating.  Fund of Knowledge: Below average  Attention/Concentration: Poor  Language ability: Significantly impaired  Memory: Global memory impairment  Insight:  Poor.  Judgement: Poor  Orientation: Only to person  Psychomotor Behavior: slowed    Muscle Strength and Tone: MuscleStrength: Normal and Atrophy  Gait and Station: Not evaluated       LABS   personally reviewed.     No results found for: PHENYTOIN, PHENOBARB, VALPROATE, CBMZ       DIAGNOSIS   Principal Problem:    Major neurocognitive disorder, due to multiple etiologies, with behavioral disturbance, severe (H)    Active Problem List:  Patient Active Problem List   Diagnosis     TBI with aggressive behavior     HTN (hypertension)     COPD (chronic obstructive pulmonary disease) (H)     Dementia with behavioral disturbance (H)     Chronic schizophrenia (H)     Smoker "     Agitation     Behavior disturbance     Psychosis (H)     Major neurocognitive disorder, due to multiple etiologies, with behavioral disturbance, severe (H)     Paranoid schizophrenia, chronic condition with acute exacerbation (H)     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 Ongoing education given regarding diagnostic and treatment options with risks, benefits and alternatives and adequate verbalization of understanding.  2.  Medications       BuSpar 30 mg 2 times daily       Cogentin 0.5 mg 2 times a day       Clozaril 225 mg at bedtime       Aricept 10 mg at bedtime       Gabapentin 100 mg 3 times a day       Haldol 2 mg at bedtime       Namenda 10 mg 2 times a day       Remeron 15 mg at bedtime       melatonin 5 mg at bedtime  3.  Medical team following the patient   4.   coordinating a safe discharge plan with the patient.    CBC today is within normal limits.  Risk Assessment: [facility] RISK ASSESSMENT: Patient able to contract for safety    Coordination of Care:   Treatment Plan reviewed and physician signed, Care discussed with Care/Treatment Team Members, Chart reviewed and Patient seen      Re-Certification I certify that the inpatient psychiatric facility services furnished since the previous certification were, and continue to be, medically necessary for, either, treatment which could reasonably be expected to improve the patient s condition or diagnostic study and that the hospital records indicate that the services furnished were, either, intensive treatment services, admission and related services necessary for diagnostic study, or equivalent services.     I certify that the patient continues to need, on a daily basis, active treatment furnished directly by or requiring the supervision of inpatient psychiatric  facility personnel.   I estimate 14 days of hospitalization is necessary for proper treatment of the patient. My plans for post-hospital care for this patient are  TBD     Ginger Dubon MD    -     06/23/2022  -     1:44 PM    Total time 15 minutes with > 50%spent on coordination of cares and psycho-education.    This note was created with help of Dragon dictation system. Grammatical / typing errors are not intentional.    Ginger Dubon MD

## 2022-06-23 NOTE — PLAN OF CARE
Problem: Behavioral Health Plan of Care  Goal: Plan of Care Review  Outcome: Ongoing, Progressing     Goal Outcome Evaluation:    Plan of Care Reviewed With: patient          Patient remains isolative to his room, oriented to self only. He denies SI, SIB and hallucination. He denies anxiety and depression. Has flat/blunted affect in a calm mood. He is medication complaint. Didn't attend group. Has fair appetite and his sleep is good. denies pain and VSS. Continue to monitor pt. as plan of care.

## 2022-06-24 LAB
BASOPHILS # BLD AUTO: 0.1 10E3/UL (ref 0–0.2)
BASOPHILS NFR BLD AUTO: 1 %
EOSINOPHIL # BLD AUTO: 1.8 10E3/UL (ref 0–0.7)
EOSINOPHIL NFR BLD AUTO: 25 %
ERYTHROCYTE [DISTWIDTH] IN BLOOD BY AUTOMATED COUNT: 13.8 % (ref 10–15)
HCT VFR BLD AUTO: 41.8 % (ref 40–53)
HGB BLD-MCNC: 14.8 G/DL (ref 13.3–17.7)
IMM GRANULOCYTES # BLD: 0 10E3/UL
IMM GRANULOCYTES NFR BLD: 0 %
LYMPHOCYTES # BLD AUTO: 1.6 10E3/UL (ref 0.8–5.3)
LYMPHOCYTES NFR BLD AUTO: 23 %
MCH RBC QN AUTO: 32.2 PG (ref 26.5–33)
MCHC RBC AUTO-ENTMCNC: 35.4 G/DL (ref 31.5–36.5)
MCV RBC AUTO: 91 FL (ref 78–100)
MONOCYTES # BLD AUTO: 0.6 10E3/UL (ref 0–1.3)
MONOCYTES NFR BLD AUTO: 8 %
NEUTROPHILS # BLD AUTO: 3 10E3/UL (ref 1.6–8.3)
NEUTROPHILS NFR BLD AUTO: 43 %
NRBC # BLD AUTO: 0 10E3/UL
NRBC BLD AUTO-RTO: 0 /100
PLATELET # BLD AUTO: 172 10E3/UL (ref 150–450)
RBC # BLD AUTO: 4.59 10E6/UL (ref 4.4–5.9)
WBC # BLD AUTO: 7 10E3/UL (ref 4–11)

## 2022-06-24 PROCEDURE — 124N000003 HC R&B MH SENIOR/ADOLESCENT

## 2022-06-24 PROCEDURE — 99231 SBSQ HOSP IP/OBS SF/LOW 25: CPT | Performed by: PSYCHIATRY & NEUROLOGY

## 2022-06-24 PROCEDURE — 250N000013 HC RX MED GY IP 250 OP 250 PS 637: Performed by: PSYCHIATRY & NEUROLOGY

## 2022-06-24 PROCEDURE — 36415 COLL VENOUS BLD VENIPUNCTURE: CPT | Performed by: PSYCHIATRY & NEUROLOGY

## 2022-06-24 PROCEDURE — 85025 COMPLETE CBC W/AUTO DIFF WBC: CPT | Performed by: PSYCHIATRY & NEUROLOGY

## 2022-06-24 RX ADMIN — GABAPENTIN 100 MG: 100 CAPSULE ORAL at 17:12

## 2022-06-24 RX ADMIN — SALMETEROL XINAFOATE 1 PUFF: 50 POWDER, METERED ORAL; RESPIRATORY (INHALATION) at 08:29

## 2022-06-24 RX ADMIN — BENZTROPINE MESYLATE 0.5 MG: 0.5 TABLET ORAL at 20:16

## 2022-06-24 RX ADMIN — GABAPENTIN 100 MG: 100 CAPSULE ORAL at 08:28

## 2022-06-24 RX ADMIN — BUSPIRONE HYDROCHLORIDE 30 MG: 15 TABLET ORAL at 08:28

## 2022-06-24 RX ADMIN — ASPIRIN 81 MG: 81 TABLET, COATED ORAL at 08:28

## 2022-06-24 RX ADMIN — CLOZAPINE 225 MG: 100 TABLET ORAL at 20:15

## 2022-06-24 RX ADMIN — HALOPERIDOL 2 MG: 1 TABLET ORAL at 20:16

## 2022-06-24 RX ADMIN — MEMANTINE 10 MG: 10 TABLET ORAL at 08:28

## 2022-06-24 RX ADMIN — SALMETEROL XINAFOATE 1 PUFF: 50 POWDER, METERED ORAL; RESPIRATORY (INHALATION) at 20:19

## 2022-06-24 RX ADMIN — MIRTAZAPINE 15 MG: 15 TABLET, FILM COATED ORAL at 20:16

## 2022-06-24 RX ADMIN — LEVOTHYROXINE SODIUM 88 MCG: 0.09 TABLET ORAL at 08:28

## 2022-06-24 RX ADMIN — MEMANTINE 10 MG: 10 TABLET ORAL at 20:16

## 2022-06-24 RX ADMIN — DONEPEZIL HYDROCHLORIDE 10 MG: 10 TABLET ORAL at 20:16

## 2022-06-24 RX ADMIN — GABAPENTIN 100 MG: 100 CAPSULE ORAL at 12:22

## 2022-06-24 RX ADMIN — Medication 5 MG: at 20:16

## 2022-06-24 RX ADMIN — BUSPIRONE HYDROCHLORIDE 30 MG: 15 TABLET ORAL at 20:16

## 2022-06-24 RX ADMIN — MEGESTROL ACETATE 20 MG: 20 TABLET ORAL at 08:28

## 2022-06-24 RX ADMIN — BENZTROPINE MESYLATE 0.5 MG: 0.5 TABLET ORAL at 08:28

## 2022-06-24 ASSESSMENT — ACTIVITIES OF DAILY LIVING (ADL)
HYGIENE/GROOMING: WITH ASSISTANCE
ORAL_HYGIENE: WITH ASSISTANCE
HYGIENE/GROOMING: PROMPTS;WITH ASSISTANCE
DRESS: PROMPTS;WITH ASSISTANCE
ORAL_HYGIENE: PROMPTS;WITH ASSISTANCE
DRESS: WITH ASSISTANCE

## 2022-06-24 NOTE — PLAN OF CARE
Goal Outcome Evaluation:    Plan of Care Reviewed With: patient       Patient remains isolative and withdrawn. He just came out for dinner and immediately went back to his room. He only ate about 75% of his dinner. He did not drink all of his fluids served like the juices and milk. Patient adheres to the medication regimen. He denied SI/SIB but didn't say anything whether he is experiencing any form of hallucinations. Patient just stared at this writer. He is calm with a flat affect. His speech is soft amd mumbles or whispers which hardly can be understood. He remains deficient in insights and judgment. He is alert and oriented to self only. He looks disheveled but declined to take a shower. He slept after taking his bedtime meds.

## 2022-06-24 NOTE — PLAN OF CARE
"  Problem: Behavior Regulation Impairment (Psychotic Signs/Symptoms)  Goal: Improved Behavioral Control (Psychotic Signs/Symptoms)  Outcome: Met  Flowsheets (Taken 6/24/2022 1259)  Mutually Determined Action Steps (Improved Behavioral Control): verbalizes gratifying activity   Goal Outcome Evaluation:    Plan of Care Reviewed With: patient           Nursing Assessment    Psychosis (H) [F29]    Admit Date: 1/26/2022    Length of Stay: 149    Patient evaluation continues. Assessed mood,anxiety,thoughts and behavior. Patient  is progressing towards goals. Patient is encouraged to participate in groups and assisted to develop healthy coping skills.  Patient admits to auditory hallucinations. Pt states he is talking to his sister./76   Pulse 93   Temp 97.5  F (36.4  C)   Resp 18   Ht 1.778 m (5' 10\")   Wt 77.1 kg (170 lb)   SpO2 98%   BMI 24.39 kg/m    Pt spends much of the shift in his room resting. Pt does not interact with peers. Pt is quiet at times and will not respond to questions. Pt is calm and cooperative.  Mood: good    Patient reports depression no response and reports anxiety some    Affect: flat blunted but smiles upon approach    Sleep: good 8-10 hours each night    Appetite: good 100%    SI: denies    HI: denies    SIB: denies      Medication Compliance yes    Group participation: likes to play [employer]    ADL's: assist    Fall risk interventions: proper foot wear, orthostatic B/P, standby assist    Rinku Score Interventions: none    Discharge planning in process waiting forplacement    Refer to daily team meeting notes for individualized plan of care. Nursing will continue to assess.    *Scale is 1-10 and 10 is the worst.             "

## 2022-06-24 NOTE — PLAN OF CARE
Problem: Sleep Disturbance (Psychotic Signs/Symptoms)  Goal: Improved Sleep (Psychotic Signs/Symptoms)  Outcome: Ongoing, Progressing   Goal Outcome Evaluation:        Patient slept a total of 9.5 hours. Woke up in the middle of the shift, went to the bathroom to void and went back to sleep after fifteen minutes. No behavioral issues raised the whole shift.

## 2022-06-24 NOTE — PROGRESS NOTES
"PSYCHIATRY  PROGRESS NOTE     DATE OF SERVICE   06/24/2022       CHIEF COMPLAINT   Patient was admitted due to inability to safely care for himself and psychosis.       SUBJECTIVE   Nursing reports:   Patient evaluation continues. Assessed mood,anxiety,thoughts and behavior. Patient  is progressing towards goals. Patient is encouraged to participate in groups and assisted to develop healthy coping skills.  Patient admits to auditory hallucinations. Pt states he is talking to his sister./76   Pulse 93   Temp 97.5  F (36.4  C)   Resp 18   Ht 1.778 m (5' 10\")   Wt 77.1 kg (170 lb)   SpO2 98%   BMI 24.39 kg/m    Pt spends much of the shift in his room resting. Pt does not interact with peers. Pt is quiet at times and will not respond to questions. Pt is calm and cooperative.  Mood: good    Patient has been discussed in detail with the  during team meeting.  We continue to have no news about the guardianship process.     OBJECTIVE   Patient was seen and evaluated at bedside by himself, this was a face-to-face evaluation.  Today the patient refused to engage with this writer and he seemed to be responding to internal stimuli.  He seemed calm and denied any issues at the time.       MEDICATIONS   Medications:  Scheduled Meds:    aspirin  81 mg Oral Daily     benztropine  0.5 mg Oral BID     busPIRone HCl  30 mg Oral BID     cloZAPine  225 mg Oral At Bedtime     donepezil  10 mg Oral At Bedtime     gabapentin  100 mg Oral TID w/meals     haloperidol  2 mg Oral At Bedtime     levothyroxine  88 mcg Oral Daily     megestrol  20 mg Oral Daily     melatonin  5 mg Oral At Bedtime     memantine  10 mg Oral BID     mirtazapine  15 mg Oral At Bedtime     salmeterol  1 puff Inhalation BID     Continuous Infusions:  PRN Meds:.albuterol, alum & mag hydroxide-simethicone, benzocaine-menthol, hydrOXYzine, nicotine, nystatin, polyethylene glycol, polyethylene glycol-propylene glycol PF, " "senna-docusate    Medication adherence issues: MS Med Adherence Y/N: Yes, Hospitalization  Medication side effects: MEDICATION SIDE EFFECTS: no side effects reported  Benefit: Yes / No: Yes       ROS   A comprehensive review of systems was negative.       MENTAL STATUS EXAM   Vitals: /66 (BP Location: Right arm, Patient Position: Sitting, Cuff Size: Adult Small)   Pulse 99   Temp 97.6  F (36.4  C) (Temporal)   Resp 18   Ht 1.778 m (5' 10\")   Wt 77.1 kg (170 lb)   SpO2 100%   BMI 24.39 kg/m      Appearance:  No apparent distress  Mood: Refused the assessment  Affect: Blunted  Suicidal Ideation: Refused the assessment  Homicidal Ideation: Refused the assessment  Thought process: Refused the assessment  Thought content: Remains confused and delusional.  Continues to be internally preoccupied and actively hallucinating.  Fund of Knowledge: Below average  Attention/Concentration: Poor  Language ability: Significantly impaired  Memory: Global memory impairment  Insight:  Poor.  Judgement: Poor  Orientation: Only to person  Psychomotor Behavior: slowed    Muscle Strength and Tone: MuscleStrength: Normal and Atrophy  Gait and Station: Not evaluated       LABS   personally reviewed.     No results found for: PHENYTOIN, PHENOBARB, VALPROATE, CBMZ       DIAGNOSIS   Principal Problem:    Major neurocognitive disorder, due to multiple etiologies, with behavioral disturbance, severe (H)    Active Problem List:  Patient Active Problem List   Diagnosis     TBI with aggressive behavior     HTN (hypertension)     COPD (chronic obstructive pulmonary disease) (H)     Dementia with behavioral disturbance (H)     Chronic schizophrenia (H)     Smoker     Agitation     Behavior disturbance     Psychosis (H)     Major neurocognitive disorder, due to multiple etiologies, with behavioral disturbance, severe (H)     Paranoid schizophrenia, chronic condition with acute exacerbation (H)     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 Ongoing education given regarding diagnostic and treatment options with risks, benefits and alternatives and adequate verbalization of understanding.  2.  Medications       BuSpar 30 mg 2 times daily       Cogentin 0.5 mg 2 times a day       Clozaril 225 mg at bedtime       Aricept 10 mg at bedtime       Gabapentin 100 mg 3 times a day       Haldol 2 mg at bedtime       Namenda 10 mg 2 times a day       Remeron 15 mg at bedtime       melatonin 5 mg at bedtime  3.  Medical team following the patient   4.   coordinating a safe discharge plan with the patient.    CBC today is within normal limits.  Risk Assessment: [facility] RISK ASSESSMENT: Patient able to contract for safety    Coordination of Care:   Treatment Plan reviewed and physician signed, Care discussed with Care/Treatment Team Members, Chart reviewed and Patient seen      Re-Certification I certify that the inpatient psychiatric facility services furnished since the previous certification were, and continue to be, medically necessary for, either, treatment which could reasonably be expected to improve the patient s condition or diagnostic study and that the hospital records indicate that the services furnished were, either, intensive treatment services, admission and related services necessary for diagnostic study, or equivalent services.     I certify that the patient continues to need, on a daily basis, active treatment furnished directly by or requiring the supervision of inpatient psychiatric facility personnel.   I estimate 14 days of hospitalization is necessary for proper treatment of the patient. My plans for post-hospital care for this patient are  TBD     Ginger Dubon MD    -     06/24/2022  -     3:07 PM    Total time 15 minutes with > 50%spent on  coordination of cares and psycho-education.    This note was created with help of Dragon dictation system. Grammatical / typing errors are not intentional.    Ginger Dubon MD

## 2022-06-24 NOTE — PLAN OF CARE
"Goal Outcome Evaluation:    Plan of Care Reviewed With: patient        Pt continues to isolate to room except for meals. Ate ~100% of dinner. Poverty of thought and speech. Minimal verbal responses. Speech garbled. Oriented except for date. Affect flat. Poor eye contact. Gait unsteady, escorted by writer to dining room for dinner. When asked how he was doing, he stated \"fine, just taking it easy. I want my commitment to be over, but it isn't yet.\" Med-compliant. Hygiene adequate, unkempt. Allowed assistance with combing hair upon getting out of bed. Appears preoccupied with internal stimuli at times. Continue to encourage hydration. Continue to encourage pt to allow assistance with hygiene. Continue with current treatment plan and recommendations. Continue to monitor and reassess symptoms. Monitor response to medications. Monitor progress towards treatment goals. Encourage groups and participation         "

## 2022-06-25 PROCEDURE — 250N000013 HC RX MED GY IP 250 OP 250 PS 637: Performed by: PSYCHIATRY & NEUROLOGY

## 2022-06-25 PROCEDURE — 124N000003 HC R&B MH SENIOR/ADOLESCENT

## 2022-06-25 RX ADMIN — BENZTROPINE MESYLATE 0.5 MG: 0.5 TABLET ORAL at 09:08

## 2022-06-25 RX ADMIN — BENZTROPINE MESYLATE 0.5 MG: 0.5 TABLET ORAL at 20:31

## 2022-06-25 RX ADMIN — BUSPIRONE HYDROCHLORIDE 30 MG: 15 TABLET ORAL at 20:30

## 2022-06-25 RX ADMIN — MEGESTROL ACETATE 20 MG: 20 TABLET ORAL at 09:08

## 2022-06-25 RX ADMIN — MIRTAZAPINE 15 MG: 15 TABLET, FILM COATED ORAL at 20:31

## 2022-06-25 RX ADMIN — SALMETEROL XINAFOATE 1 PUFF: 50 POWDER, METERED ORAL; RESPIRATORY (INHALATION) at 09:09

## 2022-06-25 RX ADMIN — GABAPENTIN 100 MG: 100 CAPSULE ORAL at 09:08

## 2022-06-25 RX ADMIN — LEVOTHYROXINE SODIUM 88 MCG: 0.09 TABLET ORAL at 09:08

## 2022-06-25 RX ADMIN — MEMANTINE 10 MG: 10 TABLET ORAL at 20:31

## 2022-06-25 RX ADMIN — MEMANTINE 10 MG: 10 TABLET ORAL at 09:08

## 2022-06-25 RX ADMIN — BUSPIRONE HYDROCHLORIDE 30 MG: 15 TABLET ORAL at 09:08

## 2022-06-25 RX ADMIN — CLOZAPINE 225 MG: 100 TABLET ORAL at 20:31

## 2022-06-25 RX ADMIN — DONEPEZIL HYDROCHLORIDE 10 MG: 10 TABLET ORAL at 20:30

## 2022-06-25 RX ADMIN — GABAPENTIN 100 MG: 100 CAPSULE ORAL at 18:03

## 2022-06-25 RX ADMIN — Medication 5 MG: at 20:31

## 2022-06-25 RX ADMIN — ASPIRIN 81 MG: 81 TABLET, COATED ORAL at 09:08

## 2022-06-25 RX ADMIN — SALMETEROL XINAFOATE 1 PUFF: 50 POWDER, METERED ORAL; RESPIRATORY (INHALATION) at 20:30

## 2022-06-25 RX ADMIN — GABAPENTIN 100 MG: 100 CAPSULE ORAL at 12:06

## 2022-06-25 RX ADMIN — HALOPERIDOL 2 MG: 1 TABLET ORAL at 20:31

## 2022-06-25 NOTE — PLAN OF CARE
"  Problem: Cognitive Impairment (Psychotic Signs/Symptoms)  Goal: Optimal Cognitive Function (Psychotic Signs/Symptoms)  Intervention: Support and Promote Cognitive Ability  Recent Flowsheet Documentation  Taken 6/25/2022 1145 by Wen Broderick RN  Trust Relationship/Rapport:    care explained    choices provided    emotional support provided    empathic listening provided    questions encouraged    thoughts/feelings acknowledged   Goal Outcome Evaluation:    Plan of Care Reviewed With: patient   Nursing Assessment    Psychosis (H) [F29]    Admit Date: 1/26/2022    Length of Stay: 150    Patient evaluation continues. Assessed mood,anxiety,thoughts and behavior. Patient is not progressing towards goals in self care, needs to be assisted to wash. Area on left side of face size of a quarter with 1 tiny pustule. Pt denies any pain or discomfort. Washed face lightly with soap and water, IM looked at area and recommended to just wash face well.. Pt spends much of his time in bed. Encouraged to come out for meals and did so. Fluid intake good. Appetite fair.Up to bathroom and voided X2.  Patient is encouraged to participate in groups and assisted to develop healthy coping skills.  Patient responding to auditory hallucinations. /70   Pulse 86   Temp 97.3  F (36.3  C) (Temporal)   Resp 16   Ht 1.778 m (5' 10\")   Wt 77.1 kg (170 lb)   SpO2 96%   BMI 24.39 kg/m      Mood: ok per pt    Patient reports depression as no and reports anxiety as none    Affect:flat blunted    Sleep: 9 hour last night    Appetite: 50%    SI: denies    HI: denies    SIB: denies      Medication Compliance yes    Group participation:lei    ADL's: assisted    Fall risk interventions: proper foot wear, orthostatic B/p standby assist    Rinku Score Interventions:none    Discharge planning in process    Refer to daily team meeting notes for individualized plan of care. Nursing will continue to assess.    *Scale is 1-10 and 10 is the " worst.

## 2022-06-26 PROCEDURE — 250N000013 HC RX MED GY IP 250 OP 250 PS 637: Performed by: PSYCHIATRY & NEUROLOGY

## 2022-06-26 PROCEDURE — 124N000003 HC R&B MH SENIOR/ADOLESCENT

## 2022-06-26 RX ADMIN — ASPIRIN 81 MG: 81 TABLET, COATED ORAL at 08:44

## 2022-06-26 RX ADMIN — MEGESTROL ACETATE 20 MG: 20 TABLET ORAL at 08:44

## 2022-06-26 RX ADMIN — GABAPENTIN 100 MG: 100 CAPSULE ORAL at 12:05

## 2022-06-26 RX ADMIN — BENZTROPINE MESYLATE 0.5 MG: 0.5 TABLET ORAL at 08:44

## 2022-06-26 RX ADMIN — LEVOTHYROXINE SODIUM 88 MCG: 0.09 TABLET ORAL at 08:44

## 2022-06-26 RX ADMIN — MEMANTINE 10 MG: 10 TABLET ORAL at 20:25

## 2022-06-26 RX ADMIN — MIRTAZAPINE 15 MG: 15 TABLET, FILM COATED ORAL at 20:25

## 2022-06-26 RX ADMIN — CLOZAPINE 225 MG: 100 TABLET ORAL at 20:25

## 2022-06-26 RX ADMIN — BUSPIRONE HYDROCHLORIDE 30 MG: 15 TABLET ORAL at 08:44

## 2022-06-26 RX ADMIN — SALMETEROL XINAFOATE 1 PUFF: 50 POWDER, METERED ORAL; RESPIRATORY (INHALATION) at 08:45

## 2022-06-26 RX ADMIN — GABAPENTIN 100 MG: 100 CAPSULE ORAL at 08:44

## 2022-06-26 RX ADMIN — Medication 5 MG: at 20:25

## 2022-06-26 RX ADMIN — HALOPERIDOL 2 MG: 1 TABLET ORAL at 20:26

## 2022-06-26 RX ADMIN — SALMETEROL XINAFOATE 1 PUFF: 50 POWDER, METERED ORAL; RESPIRATORY (INHALATION) at 20:26

## 2022-06-26 RX ADMIN — BUSPIRONE HYDROCHLORIDE 30 MG: 15 TABLET ORAL at 20:25

## 2022-06-26 RX ADMIN — GABAPENTIN 100 MG: 100 CAPSULE ORAL at 18:04

## 2022-06-26 RX ADMIN — MEMANTINE 10 MG: 10 TABLET ORAL at 08:44

## 2022-06-26 RX ADMIN — BENZTROPINE MESYLATE 0.5 MG: 0.5 TABLET ORAL at 20:26

## 2022-06-26 RX ADMIN — DONEPEZIL HYDROCHLORIDE 10 MG: 10 TABLET ORAL at 20:25

## 2022-06-26 ASSESSMENT — ACTIVITIES OF DAILY LIVING (ADL)
ORAL_HYGIENE: WITH ASSISTANCE
DRESS: WITH ASSISTANCE
HYGIENE/GROOMING: WITH ASSISTANCE

## 2022-06-26 NOTE — PLAN OF CARE
"Goal Outcome Evaluation:    Plan of Care Reviewed With: patient        Pt continues to isolate to bed most of shift except for dinner. Standby assist of 1 to ambulate to dinner, gait unsteady especially when first getting up. Pulse elevated: 110. Pt denies anxiety. Declines offer or PRN medication. Eye contact good today. Asked \"what's for dinner?\" Stated he wished it were \"meatballs with spaghetti-O's.\" Stated further that he likes \"spam burgers\" and \"steak with masked potatoes and mixed vegetables.\" During this conversation, pt turned away from writer and appeared to be conversing with someone not present in the room. Then turned back to writer and stated \"Somebody's coming to get me tonight.\" Pleasant, cooperative, med-compliant. Hygiene poor, unkempt. Continue to encourage hydration. Continue to encourage pt to allow assistance with hygiene. Continue with current treatment plan and recommendations. Continue to monitor and reassess symptoms. Monitor response to medications. Monitor progress towards treatment goals. Encourage groups and participation         "

## 2022-06-26 NOTE — PLAN OF CARE
"  Problem: Behavior Regulation Impairment (Psychotic Signs/Symptoms)  Goal: Improved Behavioral Control (Psychotic Signs/Symptoms)  Outcome: Met   Goal Outcome Evaluation:    Plan of Care Reviewed With: patient      Nursing Assessment    Psychosis (H) [F29]    Admit Date: 1/26/2022    Length of Stay: 151    Patient evaluation continues. Assessed mood,anxiety,thoughts and behavior. Patient is  progressing towards goals. Patient is encouraged to participate in groups and assisted to develop healthy coping skills.  Patient admits to  auditory hallucinations. BP (!) 89/67 (BP Location: Left arm, Patient Position: Supine, Cuff Size: Adult Regular)   Pulse 99   Temp 97.7  F (36.5  C) (Temporal)   Resp 16   Ht 1.778 m (5' 10\")   Wt 77.1 kg (170 lb)   SpO2 94%   BMI 24.39 kg/m    B/p recheck with small cuff 110/68    Mood: good    Patient reports depression as none and reports anxiety none    Affect:flat blunted     Sleep: good naps during the day    Appetite: good    SI: denies    HI: denies    SIB: denies      Medication Compliance yes    Group participation: likes lei    ADL's: independent    Fall risk interventions: proper foot wear, orthostatic B/P, standby assist    Rinku Score Interventions:none    Discharge planning in process    Refer to daily team meeting notes for individualized plan of care. Nursing will continue to assess.    *Scale is 1-10 and 10 is the worst.                  "

## 2022-06-26 NOTE — PLAN OF CARE
Problem: Sleep Disturbance  Goal: Adequate Sleep/Rest  Outcome: Ongoing, Progressing    Patient slept approximately 7 hours, safety checks in progress this shift, no prn given or requested,breathing quite and unlabored,  will monitor and assist as needed.

## 2022-06-26 NOTE — PLAN OF CARE
"Goal Outcome Evaluation:    Plan of Care Reviewed With: patient        Pt continues to isolate to room except for meals. Ate ~100% of dinner. Poverty of thought and speech. Minimal verbal responses. Speech garbled. Oriented except for date, place. Affect flat. Poor eye contact. Exclaimed \"fuck\" during one interaction. When asked if he was OK, he stated \"yeah.\" Denied AH/VH but appears preoccupied with internal stimuli at times. Med-compliant. Hygiene adequate, unkempt. Continue to encourage hydration. Continue to encourage pt to allow assistance with hygiene. Continue with current treatment plan and recommendations. Continue to monitor and reassess symptoms. Monitor response to medications. Monitor progress towards treatment goals. Encourage groups and participation         "

## 2022-06-27 PROCEDURE — 250N000013 HC RX MED GY IP 250 OP 250 PS 637: Performed by: PSYCHIATRY & NEUROLOGY

## 2022-06-27 PROCEDURE — 99231 SBSQ HOSP IP/OBS SF/LOW 25: CPT | Performed by: PSYCHIATRY & NEUROLOGY

## 2022-06-27 PROCEDURE — 124N000003 HC R&B MH SENIOR/ADOLESCENT

## 2022-06-27 RX ADMIN — MEGESTROL ACETATE 20 MG: 20 TABLET ORAL at 08:43

## 2022-06-27 RX ADMIN — BENZTROPINE MESYLATE 0.5 MG: 0.5 TABLET ORAL at 20:07

## 2022-06-27 RX ADMIN — Medication 5 MG: at 20:10

## 2022-06-27 RX ADMIN — BUSPIRONE HYDROCHLORIDE 30 MG: 15 TABLET ORAL at 08:43

## 2022-06-27 RX ADMIN — ASPIRIN 81 MG: 81 TABLET, COATED ORAL at 08:43

## 2022-06-27 RX ADMIN — SALMETEROL XINAFOATE 1 PUFF: 50 POWDER, METERED ORAL; RESPIRATORY (INHALATION) at 20:06

## 2022-06-27 RX ADMIN — BUSPIRONE HYDROCHLORIDE 30 MG: 15 TABLET ORAL at 20:10

## 2022-06-27 RX ADMIN — MEMANTINE 10 MG: 10 TABLET ORAL at 08:43

## 2022-06-27 RX ADMIN — DONEPEZIL HYDROCHLORIDE 10 MG: 10 TABLET ORAL at 20:07

## 2022-06-27 RX ADMIN — GABAPENTIN 100 MG: 100 CAPSULE ORAL at 17:25

## 2022-06-27 RX ADMIN — CLOZAPINE 225 MG: 100 TABLET ORAL at 20:07

## 2022-06-27 RX ADMIN — LEVOTHYROXINE SODIUM 88 MCG: 0.09 TABLET ORAL at 08:43

## 2022-06-27 RX ADMIN — GABAPENTIN 100 MG: 100 CAPSULE ORAL at 12:34

## 2022-06-27 RX ADMIN — BENZTROPINE MESYLATE 0.5 MG: 0.5 TABLET ORAL at 08:43

## 2022-06-27 RX ADMIN — MEMANTINE 10 MG: 10 TABLET ORAL at 20:07

## 2022-06-27 RX ADMIN — MIRTAZAPINE 15 MG: 15 TABLET, FILM COATED ORAL at 20:10

## 2022-06-27 RX ADMIN — GABAPENTIN 100 MG: 100 CAPSULE ORAL at 08:43

## 2022-06-27 RX ADMIN — HALOPERIDOL 2 MG: 1 TABLET ORAL at 20:07

## 2022-06-27 RX ADMIN — SALMETEROL XINAFOATE 1 PUFF: 50 POWDER, METERED ORAL; RESPIRATORY (INHALATION) at 08:43

## 2022-06-27 ASSESSMENT — ACTIVITIES OF DAILY LIVING (ADL)
HYGIENE/GROOMING: WITH ASSISTANCE
DRESS: WITH ASSISTANCE
HYGIENE/GROOMING: WITH ASSISTANCE
LAUNDRY: UNABLE TO COMPLETE
ORAL_HYGIENE: PROMPTS;WITH ASSISTANCE
ORAL_HYGIENE: PROMPTS
DRESS: WITH ASSISTANCE
LAUNDRY: UNABLE TO COMPLETE

## 2022-06-27 NOTE — PLAN OF CARE
Problem: Behavior Regulation Impairment (Psychotic Signs/Symptoms)  Goal: Improved Behavioral Control (Psychotic Signs/Symptoms)  Outcome: Ongoing, Not Progressing     Problem: Cognitive Impairment (Psychotic Signs/Symptoms)  Goal: Optimal Cognitive Function (Psychotic Signs/Symptoms)  Outcome: Ongoing, Not Progressing  Intervention: Support and Promote Cognitive Ability  Recent Flowsheet Documentation  Taken 6/27/2022 1100 by Juarez Murphy RN  Trust Relationship/Rapport:   care explained   choices provided   questions encouraged   thoughts/feelings acknowledged   Goal Outcome Evaluation:    Plan of Care Reviewed With: patient     Patient is calm, medication compliant. Isolative and withdrawn, denied SI,SIB, hallucinations, anxiety and depression. Ate breakfast in room, declined lunch. Patient's lunch tray is saved on top of refrigerator. Patient did not attend groups or socialize with peers. Refused to shower, no further concerns at this time.

## 2022-06-27 NOTE — PLAN OF CARE
Problem: Behavioral Health Plan of Care  Goal: Adheres to Safety Considerations for Self and Others  Outcome: Ongoing, Progressing  Intervention: Develop and Maintain Individualized Safety Plan  Recent Flowsheet Documentation  Taken 6/27/2022 1814 by Dann Maldonado RN  Safety Measures:    environmental rounds completed    safety rounds completed    suicide check-in completed       Plan of Care Reviewed With: patient    Pt calm, cooperative, denies SI/SIB, no depression, anxiety, hallucination. Affect flat, intermittent eye contact when talked to. Withdrawn and isolative. Voice soft, at times hard to understand.  Pt denies any pain, sat up during our conversation and ate 75% of his food for lunch. Pt ate late lunch. Requested to bring his dinner tray later to his room. Pt ate 25% of dinner. Pt medication compliant. Did not get out from his room the whole shift.

## 2022-06-27 NOTE — PROGRESS NOTES
PSYCHIATRY  PROGRESS NOTE     DATE OF SERVICE   06/27/2022       CHIEF COMPLAINT   Patient was admitted due to inability to safely care for himself and psychosis.       SUBJECTIVE   Nursing reports:   Patient is calm, medication compliant. Isolative and withdrawn, denied SI,SIB, hallucinations, anxiety and depression. Ate breakfast in room, declined lunch. Patient's lunch tray is saved on top of refrigerator. Patient did not attend groups or socialize with peers. Refused to shower, no further concerns at this time.     Patient has been discussed in detail with the  during team meeting.  Again we have not received any updates from the guardianship hearing.     OBJECTIVE   Patient was seen and evaluated at bedside by himself, this was a face-to-face evaluation.  Patient presented as calm, pleasant and cooperative with the assessment.  The patient continues to denied all psychiatric symptoms but he is actively responding to internal stimuli.  Denies any thoughts of harming himself and he is shayan for safety.       MEDICATIONS   Medications:  Scheduled Meds:    aspirin  81 mg Oral Daily     benztropine  0.5 mg Oral BID     busPIRone HCl  30 mg Oral BID     cloZAPine  225 mg Oral At Bedtime     donepezil  10 mg Oral At Bedtime     gabapentin  100 mg Oral TID w/meals     haloperidol  2 mg Oral At Bedtime     levothyroxine  88 mcg Oral Daily     megestrol  20 mg Oral Daily     melatonin  5 mg Oral At Bedtime     memantine  10 mg Oral BID     mirtazapine  15 mg Oral At Bedtime     salmeterol  1 puff Inhalation BID     Continuous Infusions:  PRN Meds:.albuterol, alum & mag hydroxide-simethicone, benzocaine-menthol, hydrOXYzine, nicotine, nystatin, polyethylene glycol, polyethylene glycol-propylene glycol PF, senna-docusate    Medication adherence issues: MS Med Adherence Y/N: Yes, Hospitalization  Medication side effects: MEDICATION SIDE EFFECTS: no side effects reported  Benefit: Yes / No: Yes       ROS  "  A comprehensive review of systems was negative.       MENTAL STATUS EXAM   Vitals: /81 (BP Location: Left arm)   Pulse 96   Temp 98.1  F (36.7  C) (Temporal)   Resp 16   Ht 1.778 m (5' 10\")   Wt 77.1 kg (170 lb)   SpO2 97%   BMI 24.39 kg/m      Appearance:  No apparent distress  Mood: \"I am OK\"  Affect: Blunted  Suicidal Ideation: Denies  Homicidal Ideation: Denies  Thought process: Disorganized  Thought content: Remains confused and delusional.  Continues to be internally preoccupied and actively hallucinating.  Fund of Knowledge: Below average  Attention/Concentration: Poor  Language ability: Significantly impaired  Memory: Global memory impairment  Insight:  Poor.  Judgement: Poor  Orientation: Only to person  Psychomotor Behavior: slowed    Muscle Strength and Tone: MuscleStrength: Normal and Atrophy  Gait and Station: Not evaluated       LABS   personally reviewed.     No results found for: PHENYTOIN, PHENOBARB, VALPROATE, CBMZ       DIAGNOSIS   Principal Problem:    Major neurocognitive disorder, due to multiple etiologies, with behavioral disturbance, severe (H)    Active Problem List:  Patient Active Problem List   Diagnosis     TBI with aggressive behavior     HTN (hypertension)     COPD (chronic obstructive pulmonary disease) (H)     Dementia with behavioral disturbance (H)     Chronic schizophrenia (H)     Smoker     Agitation     Behavior disturbance     Psychosis (H)     Major neurocognitive disorder, due to multiple etiologies, with behavioral disturbance, severe (H)     Paranoid schizophrenia, chronic condition with acute exacerbation (H)     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 Ongoing education given regarding diagnostic and treatment options with risks, benefits and alternatives and adequate verbalization of " understanding.  2.  Medications       BuSpar 30 mg 2 times daily       Cogentin 0.5 mg 2 times a day       Clozaril 225 mg at bedtime       Aricept 10 mg at bedtime       Gabapentin 100 mg 3 times a day       Haldol 2 mg at bedtime       Namenda 10 mg 2 times a day       Remeron 15 mg at bedtime       melatonin 5 mg at bedtime  3.  Medical team following the patient   4.   coordinating a safe discharge plan with the patient.    CBC today is within normal limits.  Risk Assessment: [facility] RISK ASSESSMENT: Patient able to contract for safety    Coordination of Care:   Treatment Plan reviewed and physician signed, Care discussed with Care/Treatment Team Members, Chart reviewed and Patient seen      Re-Certification I certify that the inpatient psychiatric facility services furnished since the previous certification were, and continue to be, medically necessary for, either, treatment which could reasonably be expected to improve the patient s condition or diagnostic study and that the hospital records indicate that the services furnished were, either, intensive treatment services, admission and related services necessary for diagnostic study, or equivalent services.     I certify that the patient continues to need, on a daily basis, active treatment furnished directly by or requiring the supervision of inpatient psychiatric facility personnel.   I estimate 14 days of hospitalization is necessary for proper treatment of the patient. My plans for post-hospital care for this patient are  TBD     Ginger Dubon MD    -     06/27/2022  -     3:12 PM    Total time 15 minutes with > 50%spent on coordination of cares and psycho-education.    This note was created with help of Dragon dictation system. Grammatical / typing errors are not intentional.    Ginger Dubon MD

## 2022-06-27 NOTE — PLAN OF CARE
06/27/22 1627   Individualization/Patient Specific Goals   Patient Personal Strengths resilient   Patient Vulnerabilities lacks insight into illness   Anxieties, Fears or Concerns Patient wants to discharge to his mansion.   Individualized Care Needs Patient is waiting on court ruling for guardianship   Patient-Specific Goals (Include Timeframe) 10-14 days discharge goal, waiting on guardianship ruling.   Interprofessional Rounds   Summary Patient continues to be at baseline. He is not able to care for himself.   Participants nursing;psychiatrist;CTC;OT   Team Discussion   Participants Dr. Root, OT, RN, CTC   Progress Making progress, patient is at baseline.   Anticipated length of stay 10-14 days   Continued Stay Criteria/Rationale Patient is not able to care for self, and lacks capacity. Team is waiting on guardianship hearing.   Medical/Physical Please see H and P.   Precautions fall   Plan Continue with plan for stabilization: medication management, psychiatric evaluations, nursing assessments, CTC case management, group therapy, and milieu therapy.   Safety Plan code 2,3 s-15, comm/[place], waiting on guardianship case.   Anticipated Discharge Disposition assisted living     PRECAUTIONS AND SAFETY    Behavioral Orders   Procedures    Code 1 - Restrict to Unit    Code 2     CT scan only    Code 3     Patient can go out of the unit with staff supervision. He needs to be taken out by him self with 2 staff and security present.    Fall precautions    Routine Programming     As clinically indicated    Status 15     Every 15 minutes.       Safety  Safety WDL: WDL  Patient Location: patient room, own  Observed Behavior: calm  Observed Behavior (Comment): napping  Safety Measures: environmental rounds completed, safety rounds completed, suicide check-in completed  Diversional Activity: other (see comments) (snacks, bingo)  Suicidality: Status 15, Promote patient engagement with treatment process  Seizure  precautions: calm, consistent lighting  Assault: status 15  Elopement Assessment: Statements about wanting to leave  Elopement Interventions: status 15  Sexual: status 15

## 2022-06-27 NOTE — PLAN OF CARE
Problem: Sleep Disturbance (Psychotic Signs/Symptoms)  Goal: Improved Sleep (Psychotic Signs/Symptoms)  Outcome: Ongoing, Progressing   Goal Outcome Evaluation:    Patient slept a total of 10.5  hours. No complaints noted the whole shift.

## 2022-06-27 NOTE — PLAN OF CARE
Care coordination:  - Patient's case was discussed in team.  - Patient had an interview with [facility] on 6/30 at 9:30 a.m. The RN visited in person.

## 2022-06-28 PROCEDURE — 250N000013 HC RX MED GY IP 250 OP 250 PS 637: Performed by: PSYCHIATRY & NEUROLOGY

## 2022-06-28 PROCEDURE — 99231 SBSQ HOSP IP/OBS SF/LOW 25: CPT | Performed by: PSYCHIATRY & NEUROLOGY

## 2022-06-28 PROCEDURE — 124N000003 HC R&B MH SENIOR/ADOLESCENT

## 2022-06-28 RX ADMIN — Medication 5 MG: at 20:03

## 2022-06-28 RX ADMIN — GABAPENTIN 100 MG: 100 CAPSULE ORAL at 17:31

## 2022-06-28 RX ADMIN — GABAPENTIN 100 MG: 100 CAPSULE ORAL at 13:39

## 2022-06-28 RX ADMIN — MEGESTROL ACETATE 20 MG: 20 TABLET ORAL at 08:30

## 2022-06-28 RX ADMIN — BENZTROPINE MESYLATE 0.5 MG: 0.5 TABLET ORAL at 20:02

## 2022-06-28 RX ADMIN — ASPIRIN 81 MG: 81 TABLET, COATED ORAL at 08:30

## 2022-06-28 RX ADMIN — SALMETEROL XINAFOATE 1 PUFF: 50 POWDER, METERED ORAL; RESPIRATORY (INHALATION) at 08:30

## 2022-06-28 RX ADMIN — SALMETEROL XINAFOATE 1 PUFF: 50 POWDER, METERED ORAL; RESPIRATORY (INHALATION) at 19:58

## 2022-06-28 RX ADMIN — DONEPEZIL HYDROCHLORIDE 10 MG: 10 TABLET ORAL at 20:03

## 2022-06-28 RX ADMIN — BUSPIRONE HYDROCHLORIDE 30 MG: 15 TABLET ORAL at 20:02

## 2022-06-28 RX ADMIN — HALOPERIDOL 2 MG: 1 TABLET ORAL at 20:03

## 2022-06-28 RX ADMIN — MEMANTINE 10 MG: 10 TABLET ORAL at 20:02

## 2022-06-28 RX ADMIN — LEVOTHYROXINE SODIUM 88 MCG: 0.09 TABLET ORAL at 08:30

## 2022-06-28 RX ADMIN — GABAPENTIN 100 MG: 100 CAPSULE ORAL at 08:30

## 2022-06-28 RX ADMIN — MIRTAZAPINE 15 MG: 15 TABLET, FILM COATED ORAL at 20:03

## 2022-06-28 RX ADMIN — BUSPIRONE HYDROCHLORIDE 30 MG: 15 TABLET ORAL at 08:30

## 2022-06-28 RX ADMIN — BENZTROPINE MESYLATE 0.5 MG: 0.5 TABLET ORAL at 08:30

## 2022-06-28 RX ADMIN — MEMANTINE 10 MG: 10 TABLET ORAL at 08:30

## 2022-06-28 RX ADMIN — CLOZAPINE 225 MG: 100 TABLET ORAL at 20:02

## 2022-06-28 ASSESSMENT — ACTIVITIES OF DAILY LIVING (ADL)
HYGIENE/GROOMING: PROMPTS;WITH ASSISTANCE
DRESS: WITH ASSISTANCE
ORAL_HYGIENE: PROMPTS

## 2022-06-28 NOTE — PROGRESS NOTES
PSYCHIATRY  PROGRESS NOTE     DATE OF SERVICE   06/28/2022       CHIEF COMPLAINT   Patient was admitted due to inability to safely care for himself and psychosis.       SUBJECTIVE   Nursing reports:   Patient is calm, medication compliant. Isolative and withdrawn, unable to check in with writer. Appears depressed and hopeless. Declined breakfast and lunch, fluids pushed with medication administration. Patient refused to leave room this shift, no further concerns at this time.     Patient has been discussed in detail with the  during team meeting.  Again we have not received any updates from the guardianship hearing.     OBJECTIVE   Patient was seen and evaluated at bedside by himself, this was a face-to-face evaluation.  Patient reported that he is doing all right and denies any psychotic symptoms.  He continues to ask when he will be leaving the hospital and I informed him that we continue to wait on the  to make a decision about the guardianship.  Patient did not seem to understand this information but was calm during the entire assessment.       MEDICATIONS   Medications:  Scheduled Meds:    aspirin  81 mg Oral Daily     benztropine  0.5 mg Oral BID     busPIRone HCl  30 mg Oral BID     cloZAPine  225 mg Oral At Bedtime     donepezil  10 mg Oral At Bedtime     gabapentin  100 mg Oral TID w/meals     haloperidol  2 mg Oral At Bedtime     levothyroxine  88 mcg Oral Daily     megestrol  20 mg Oral Daily     melatonin  5 mg Oral At Bedtime     memantine  10 mg Oral BID     mirtazapine  15 mg Oral At Bedtime     salmeterol  1 puff Inhalation BID     Continuous Infusions:  PRN Meds:.albuterol, alum & mag hydroxide-simethicone, benzocaine-menthol, hydrOXYzine, nicotine, nystatin, polyethylene glycol, polyethylene glycol-propylene glycol PF, senna-docusate    Medication adherence issues: MS Med Adherence Y/N: Yes, Hospitalization  Medication side effects: MEDICATION SIDE EFFECTS: no side effects  "reported  Benefit: Yes / No: Yes       ROS   A comprehensive review of systems was negative.       MENTAL STATUS EXAM   Vitals: /84 (BP Location: Right arm, Patient Position: Sitting, Cuff Size: Adult Small)   Pulse 102   Temp 97.9  F (36.6  C) (Temporal)   Resp 15   Ht 1.778 m (5' 10\")   Wt 77.1 kg (170 lb)   SpO2 99%   BMI 24.39 kg/m      Appearance:  No apparent distress  Mood: \"I am alright\"  Affect: Blunted  Suicidal Ideation: Denies  Homicidal Ideation: Denies  Thought process: Disorganized  Thought content: Remains confused and delusional.  Continues to be internally preoccupied and actively hallucinating.  Fund of Knowledge: Below average  Attention/Concentration: Poor  Language ability: Significantly impaired  Memory: Global memory impairment  Insight:  Poor.  Judgement: Poor  Orientation: Only to person  Psychomotor Behavior: slowed    Muscle Strength and Tone: MuscleStrength: Normal and Atrophy  Gait and Station: Not evaluated       LABS   personally reviewed.     No results found for: PHENYTOIN, PHENOBARB, VALPROATE, CBMZ       DIAGNOSIS   Principal Problem:    Major neurocognitive disorder, due to multiple etiologies, with behavioral disturbance, severe (H)    Active Problem List:  Patient Active Problem List   Diagnosis     TBI with aggressive behavior     HTN (hypertension)     COPD (chronic obstructive pulmonary disease) (H)     Dementia with behavioral disturbance (H)     Chronic schizophrenia (H)     Smoker     Agitation     Behavior disturbance     Psychosis (H)     Major neurocognitive disorder, due to multiple etiologies, with behavioral disturbance, severe (H)     Paranoid schizophrenia, chronic condition with acute exacerbation (H)     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 Ongoing education given regarding " diagnostic and treatment options with risks, benefits and alternatives and adequate verbalization of understanding.  2.  Medications       BuSpar 30 mg 2 times daily       Cogentin 0.5 mg 2 times a day       Clozaril 225 mg at bedtime       Aricept 10 mg at bedtime       Gabapentin 100 mg 3 times a day       Haldol 2 mg at bedtime       Namenda 10 mg 2 times a day       Remeron 15 mg at bedtime       melatonin 5 mg at bedtime  3.  Medical team following the patient   4.   coordinating a safe discharge plan with the patient.    CBC today is within normal limits.  Risk Assessment: [facility] RISK ASSESSMENT: Patient able to contract for safety    Coordination of Care:   Treatment Plan reviewed and physician signed, Care discussed with Care/Treatment Team Members, Chart reviewed and Patient seen      Re-Certification I certify that the inpatient psychiatric facility services furnished since the previous certification were, and continue to be, medically necessary for, either, treatment which could reasonably be expected to improve the patient s condition or diagnostic study and that the hospital records indicate that the services furnished were, either, intensive treatment services, admission and related services necessary for diagnostic study, or equivalent services.     I certify that the patient continues to need, on a daily basis, active treatment furnished directly by or requiring the supervision of inpatient psychiatric facility personnel.   I estimate 14 days of hospitalization is necessary for proper treatment of the patient. My plans for post-hospital care for this patient are  TBD     Ginger Dubon MD    -     06/28/2022  -     3:43 PM    Total time 15 minutes with > 50%spent on coordination of cares and psycho-education.    This note was created with help of Dragon dictation system. Grammatical / typing errors are not intentional.    Ginger Dubon MD

## 2022-06-28 NOTE — PLAN OF CARE
Problem: Cognitive Impairment (Psychotic Signs/Symptoms)  Goal: Optimal Cognitive Function (Psychotic Signs/Symptoms)  Outcome: Ongoing, Not Progressing  Intervention: Support and Promote Cognitive Ability  Recent Flowsheet Documentation  Taken 6/28/2022 1047 by Juarez Murphy RN  Trust Relationship/Rapport:   care explained   choices provided   questions encouraged   thoughts/feelings acknowledged     Problem: Mood Impairment (Psychotic Signs/Symptoms)  Goal: Improved Mood Symptoms (Psychotic Signs/Symptoms)  Outcome: Ongoing, Progressing   Goal Outcome Evaluation:    Plan of Care Reviewed With: patient     Patient is calm, medication compliant. Isolative and withdrawn, unable to check in with writer. Appears depressed and hopeless. Declined breakfast and lunch, fluids pushed with medication administration. Patient refused to leave room this shift, no further concerns at this time.

## 2022-06-28 NOTE — PLAN OF CARE
Problem: Sleep Disturbance (Psychotic Signs/Symptoms)  Goal: Improved Sleep (Psychotic Signs/Symptoms)  Outcome: Ongoing, Progressing   Goal Outcome Evaluation:     Patient slept a total of 10.5  of hours without any interruptions. No complaints noted the whole night.

## 2022-06-29 PROCEDURE — 250N000013 HC RX MED GY IP 250 OP 250 PS 637: Performed by: PSYCHIATRY & NEUROLOGY

## 2022-06-29 PROCEDURE — 99231 SBSQ HOSP IP/OBS SF/LOW 25: CPT | Performed by: PSYCHIATRY & NEUROLOGY

## 2022-06-29 PROCEDURE — 124N000003 HC R&B MH SENIOR/ADOLESCENT

## 2022-06-29 RX ADMIN — SALMETEROL XINAFOATE 1 PUFF: 50 POWDER, METERED ORAL; RESPIRATORY (INHALATION) at 08:35

## 2022-06-29 RX ADMIN — GABAPENTIN 100 MG: 100 CAPSULE ORAL at 12:34

## 2022-06-29 RX ADMIN — CLOZAPINE 225 MG: 100 TABLET ORAL at 20:12

## 2022-06-29 RX ADMIN — MEMANTINE 10 MG: 10 TABLET ORAL at 08:35

## 2022-06-29 RX ADMIN — ASPIRIN 81 MG: 81 TABLET, COATED ORAL at 08:35

## 2022-06-29 RX ADMIN — BUSPIRONE HYDROCHLORIDE 30 MG: 15 TABLET ORAL at 20:12

## 2022-06-29 RX ADMIN — GABAPENTIN 100 MG: 100 CAPSULE ORAL at 08:35

## 2022-06-29 RX ADMIN — BENZTROPINE MESYLATE 0.5 MG: 0.5 TABLET ORAL at 08:35

## 2022-06-29 RX ADMIN — Medication 5 MG: at 20:12

## 2022-06-29 RX ADMIN — MEMANTINE 10 MG: 10 TABLET ORAL at 20:12

## 2022-06-29 RX ADMIN — HALOPERIDOL 2 MG: 1 TABLET ORAL at 20:12

## 2022-06-29 RX ADMIN — MEGESTROL ACETATE 20 MG: 20 TABLET ORAL at 08:35

## 2022-06-29 RX ADMIN — LEVOTHYROXINE SODIUM 88 MCG: 0.09 TABLET ORAL at 08:35

## 2022-06-29 RX ADMIN — SALMETEROL XINAFOATE 1 PUFF: 50 POWDER, METERED ORAL; RESPIRATORY (INHALATION) at 20:12

## 2022-06-29 RX ADMIN — DONEPEZIL HYDROCHLORIDE 10 MG: 10 TABLET ORAL at 20:12

## 2022-06-29 RX ADMIN — BUSPIRONE HYDROCHLORIDE 30 MG: 15 TABLET ORAL at 08:35

## 2022-06-29 RX ADMIN — GABAPENTIN 100 MG: 100 CAPSULE ORAL at 17:19

## 2022-06-29 RX ADMIN — MIRTAZAPINE 15 MG: 15 TABLET, FILM COATED ORAL at 20:12

## 2022-06-29 RX ADMIN — BENZTROPINE MESYLATE 0.5 MG: 0.5 TABLET ORAL at 20:12

## 2022-06-29 NOTE — PROGRESS NOTES
PSYCHIATRY  PROGRESS NOTE     DATE OF SERVICE   06/29/2022       CHIEF COMPLAINT   Patient was admitted due to inability to safely care for himself and psychosis.       SUBJECTIVE   Nursing reports:   Patient remains at his baseline and no new behaviors have been reported.     Patient has been discussed in detail with the  during team meeting.  Again we have not received any updates from the guardianship hearing.     OBJECTIVE   Patient was seen and evaluated at bedside by himself, this was a face-to-face evaluation.  Patient remains pleasantly confused.  He has continued to ask when he will be leaving the hospital and is frustrated with my response (understandably so).  Patient has poor understanding of the commitment/guardianship process.  Continues to respond to internal stimuli and remains delusional and at his baseline.  Denies thoughts of harming himself and is shayan for safety.       MEDICATIONS   Medications:  Scheduled Meds:    aspirin  81 mg Oral Daily     benztropine  0.5 mg Oral BID     busPIRone HCl  30 mg Oral BID     cloZAPine  225 mg Oral At Bedtime     donepezil  10 mg Oral At Bedtime     gabapentin  100 mg Oral TID w/meals     haloperidol  2 mg Oral At Bedtime     levothyroxine  88 mcg Oral Daily     megestrol  20 mg Oral Daily     melatonin  5 mg Oral At Bedtime     memantine  10 mg Oral BID     mirtazapine  15 mg Oral At Bedtime     salmeterol  1 puff Inhalation BID     Continuous Infusions:  PRN Meds:.albuterol, alum & mag hydroxide-simethicone, benzocaine-menthol, hydrOXYzine, nicotine, nystatin, polyethylene glycol, polyethylene glycol-propylene glycol PF, senna-docusate    Medication adherence issues: MS Med Adherence Y/N: Yes, Hospitalization  Medication side effects: MEDICATION SIDE EFFECTS: no side effects reported  Benefit: Yes / No: Yes       ROS   A comprehensive review of systems was negative.       MENTAL STATUS EXAM   Vitals: /83   Pulse 96   Temp 97.2  F  "(36.2  C) (Temporal)   Resp 16   Ht 1.778 m (5' 10\")   Wt 77.1 kg (170 lb)   SpO2 98%   BMI 24.39 kg/m      Appearance:  No apparent distress  Mood: \"I am okay\"  Affect: Blunted  Suicidal Ideation: Denies  Homicidal Ideation: Denies  Thought process: Disorganized  Thought content: Remains confused and delusional.  Continues to be internally preoccupied and actively hallucinating.  Fund of Knowledge: Below average  Attention/Concentration: Poor  Language ability: Significantly impaired  Memory: Global memory impairment  Insight:  Poor.  Judgement: Poor  Orientation: Only to person  Psychomotor Behavior: slowed    Muscle Strength and Tone: MuscleStrength: Normal and Atrophy  Gait and Station: Not evaluated       LABS   personally reviewed.     No results found for: PHENYTOIN, PHENOBARB, VALPROATE, CBMZ       DIAGNOSIS   Principal Problem:    Major neurocognitive disorder, due to multiple etiologies, with behavioral disturbance, severe (H)    Active Problem List:  Patient Active Problem List   Diagnosis     TBI with aggressive behavior     HTN (hypertension)     COPD (chronic obstructive pulmonary disease) (H)     Dementia with behavioral disturbance (H)     Chronic schizophrenia (H)     Smoker     Agitation     Behavior disturbance     Psychosis (H)     Major neurocognitive disorder, due to multiple etiologies, with behavioral disturbance, severe (H)     Paranoid schizophrenia, chronic condition with acute exacerbation (H)     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 Ongoing education given regarding diagnostic and treatment options with risks, benefits and alternatives and adequate verbalization of understanding.  2.  Medications       BuSpar 30 mg 2 times daily       Cogentin 0.5 mg 2 times a day       Clozaril 225 mg at bedtime       " Aricept 10 mg at bedtime       Gabapentin 100 mg 3 times a day       Haldol 2 mg at bedtime       Namenda 10 mg 2 times a day       Remeron 15 mg at bedtime       melatonin 5 mg at bedtime  3.  Medical team following the patient   4.   coordinating a safe discharge plan with the patient.    CBC today is within normal limits.  Risk Assessment: [facility] RISK ASSESSMENT: Patient able to contract for safety    Coordination of Care:   Treatment Plan reviewed and physician signed, Care discussed with Care/Treatment Team Members, Chart reviewed and Patient seen      Re-Certification I certify that the inpatient psychiatric facility services furnished since the previous certification were, and continue to be, medically necessary for, either, treatment which could reasonably be expected to improve the patient s condition or diagnostic study and that the hospital records indicate that the services furnished were, either, intensive treatment services, admission and related services necessary for diagnostic study, or equivalent services.     I certify that the patient continues to need, on a daily basis, active treatment furnished directly by or requiring the supervision of inpatient psychiatric facility personnel.   I estimate 14 days of hospitalization is necessary for proper treatment of the patient. My plans for post-hospital care for this patient are  TBD     Ginger Dubon MD    -     06/29/2022  -     3:32 PM    Total time 15 minutes with > 50%spent on coordination of cares and psycho-education.    This note was created with help of Dragon dictation system. Grammatical / typing errors are not intentional.    Ginger Dubon MD

## 2022-06-29 NOTE — PLAN OF CARE
Problem: Sleep Disturbance (Psychotic Signs/Symptoms)  Goal: Improved Sleep (Psychotic Signs/Symptoms)  Outcome: Ongoing, Progressing   Goal Outcome Evaluation:     Patient slept a total of  8 hours. Got up and voided in the bathroom once during the night.No behavioral issues noted the whole night.

## 2022-06-29 NOTE — PLAN OF CARE
Goal Outcome Evaluation:    Plan of Care Reviewed With: patient        Pt continues to isolate to room except for meals. Ate ~100% of dinner. Poverty of thought and speech. One-word responses. Oriented to self only. Affect flat. Poor eye contact. Gait steady. Med-compliant. Hygiene poor. Appears preoccupied with internal stimuli at times. Continue to encourage hydration. Continue to encourage pt to allow assistance with hygiene. Continue with current treatment plan and recommendations. Continue to monitor and reassess symptoms. Monitor response to medications. Monitor progress towards treatment goals. Encourage groups and participation

## 2022-06-29 NOTE — PLAN OF CARE
Problem: Cognitive Impairment (Psychotic Signs/Symptoms)  Goal: Optimal Cognitive Function (Psychotic Signs/Symptoms)  Outcome: Ongoing, Not Progressing  Intervention: Support and Promote Cognitive Ability  Recent Flowsheet Documentation  Taken 6/29/2022 1031 by Juarez Murphy RN  Trust Relationship/Rapport:   care explained   choices provided   questions encouraged   thoughts/feelings acknowledged     Problem: Behavior Regulation Impairment (Psychotic Signs/Symptoms)  Goal: Improved Behavioral Control (Psychotic Signs/Symptoms)  Outcome: Ongoing, Progressing     Problem: Mood Impairment (Psychotic Signs/Symptoms)  Goal: Improved Mood Symptoms (Psychotic Signs/Symptoms)  Outcome: Ongoing, Progressing   Goal Outcome Evaluation:    Plan of Care Reviewed With: patient     Patient is calm, medication compliant. Isolative and withdrawn, did not attend groups. Came to the lounge for lunch, otherwise spent time in room. Patient allowed staff to assist in changing scrubs but declined shower. Alert to self only, continue with POC.

## 2022-06-30 PROCEDURE — 250N000013 HC RX MED GY IP 250 OP 250 PS 637: Performed by: PSYCHIATRY & NEUROLOGY

## 2022-06-30 PROCEDURE — 124N000003 HC R&B MH SENIOR/ADOLESCENT

## 2022-06-30 RX ADMIN — LEVOTHYROXINE SODIUM 88 MCG: 0.09 TABLET ORAL at 08:39

## 2022-06-30 RX ADMIN — BENZTROPINE MESYLATE 0.5 MG: 0.5 TABLET ORAL at 08:39

## 2022-06-30 RX ADMIN — GABAPENTIN 100 MG: 100 CAPSULE ORAL at 17:33

## 2022-06-30 RX ADMIN — MEMANTINE 10 MG: 10 TABLET ORAL at 20:54

## 2022-06-30 RX ADMIN — ASPIRIN 81 MG: 81 TABLET, COATED ORAL at 08:39

## 2022-06-30 RX ADMIN — MIRTAZAPINE 15 MG: 15 TABLET, FILM COATED ORAL at 20:57

## 2022-06-30 RX ADMIN — GABAPENTIN 100 MG: 100 CAPSULE ORAL at 08:44

## 2022-06-30 RX ADMIN — HALOPERIDOL 2 MG: 1 TABLET ORAL at 20:55

## 2022-06-30 RX ADMIN — MEMANTINE 10 MG: 10 TABLET ORAL at 08:39

## 2022-06-30 RX ADMIN — DONEPEZIL HYDROCHLORIDE 10 MG: 10 TABLET ORAL at 20:55

## 2022-06-30 RX ADMIN — SALMETEROL XINAFOATE 1 PUFF: 50 POWDER, METERED ORAL; RESPIRATORY (INHALATION) at 09:27

## 2022-06-30 RX ADMIN — MEGESTROL ACETATE 20 MG: 20 TABLET ORAL at 08:39

## 2022-06-30 RX ADMIN — BENZTROPINE MESYLATE 0.5 MG: 0.5 TABLET ORAL at 20:55

## 2022-06-30 RX ADMIN — BUSPIRONE HYDROCHLORIDE 30 MG: 15 TABLET ORAL at 20:55

## 2022-06-30 RX ADMIN — SALMETEROL XINAFOATE 1 PUFF: 50 POWDER, METERED ORAL; RESPIRATORY (INHALATION) at 20:56

## 2022-06-30 RX ADMIN — Medication 5 MG: at 20:55

## 2022-06-30 RX ADMIN — GABAPENTIN 100 MG: 100 CAPSULE ORAL at 12:37

## 2022-06-30 RX ADMIN — BUSPIRONE HYDROCHLORIDE 30 MG: 15 TABLET ORAL at 08:38

## 2022-06-30 RX ADMIN — CLOZAPINE 225 MG: 100 TABLET ORAL at 20:54

## 2022-06-30 ASSESSMENT — ACTIVITIES OF DAILY LIVING (ADL)
ADLS_ACUITY_SCORE: 92
ADLS_ACUITY_SCORE: 92
DRESS: WITH ASSISTANCE
ADLS_ACUITY_SCORE: 92
LAUNDRY: UNABLE TO COMPLETE
ADLS_ACUITY_SCORE: 92
ADLS_ACUITY_SCORE: 92
ORAL_HYGIENE: PROMPTS
ADLS_ACUITY_SCORE: 92
HYGIENE/GROOMING: PROMPTS;WITH ASSISTANCE
ADLS_ACUITY_SCORE: 96

## 2022-06-30 NOTE — PLAN OF CARE
"  Problem: Cognitive Impairment (Psychotic Signs/Symptoms)  Goal: Optimal Cognitive Function (Psychotic Signs/Symptoms)  Outcome: Ongoing, Not Progressing  Note: Patient verbalized grandiose delusional statement, including: \"I have 2 mansions in [place]\" , \"I have houses [place] the [place]\", \"I have a mansion in [place]\".      Problem: Sensory Perception Impairment (Psychotic Signs/Symptoms)  Goal: Decreased Sensory Symptoms (Psychotic Signs/Symptoms)  Outcome: Ongoing, Not Progressing  Note: Patient was observed to be talking to self as he is having a conversation with someone. When asked, he ignores staff and does not answer any questions.     Patient took medications as scheduled. Ate between 50 and 75 % of meals. Drinks fluids freely. Denied pain and all other physical issues. Neglects his hygiene and refused to take or be assisted with a shower today.        Goal Outcome Evaluation:    Plan of Care Reviewed With: patient       "

## 2022-06-30 NOTE — PLAN OF CARE
Problem: Sleep Disturbance (Psychotic Signs/Symptoms)  Goal: Improved Sleep (Psychotic Signs/Symptoms)  Outcome: Ongoing, Progressing   Goal Outcome Evaluation:     Patient slept a total of 8.75 hours. Got up and voided once during the shift. No behavioral concerns raised the whole shift.

## 2022-06-30 NOTE — PROGRESS NOTES
Patient was seen and evaluated at bedside.  Patient continues to be at his baseline.  We have not received any communication from the court.    Ginger Dubon MD

## 2022-07-01 LAB
BASOPHILS # BLD AUTO: 0.1 10E3/UL (ref 0–0.2)
BASOPHILS NFR BLD AUTO: 1 %
EOSINOPHIL # BLD AUTO: 1.8 10E3/UL (ref 0–0.7)
EOSINOPHIL NFR BLD AUTO: 21 %
ERYTHROCYTE [DISTWIDTH] IN BLOOD BY AUTOMATED COUNT: 13.8 % (ref 10–15)
HCT VFR BLD AUTO: 42 % (ref 40–53)
HGB BLD-MCNC: 14.7 G/DL (ref 13.3–17.7)
IMM GRANULOCYTES # BLD: 0 10E3/UL
IMM GRANULOCYTES NFR BLD: 1 %
LYMPHOCYTES # BLD AUTO: 1.9 10E3/UL (ref 0.8–5.3)
LYMPHOCYTES NFR BLD AUTO: 23 %
MCH RBC QN AUTO: 32.2 PG (ref 26.5–33)
MCHC RBC AUTO-ENTMCNC: 35 G/DL (ref 31.5–36.5)
MCV RBC AUTO: 92 FL (ref 78–100)
MONOCYTES # BLD AUTO: 0.7 10E3/UL (ref 0–1.3)
MONOCYTES NFR BLD AUTO: 9 %
NEUTROPHILS # BLD AUTO: 3.9 10E3/UL (ref 1.6–8.3)
NEUTROPHILS NFR BLD AUTO: 45 %
NRBC # BLD AUTO: 0 10E3/UL
NRBC BLD AUTO-RTO: 0 /100
PLATELET # BLD AUTO: 169 10E3/UL (ref 150–450)
RBC # BLD AUTO: 4.56 10E6/UL (ref 4.4–5.9)
WBC # BLD AUTO: 8.4 10E3/UL (ref 4–11)

## 2022-07-01 PROCEDURE — 250N000013 HC RX MED GY IP 250 OP 250 PS 637: Performed by: PSYCHIATRY & NEUROLOGY

## 2022-07-01 PROCEDURE — 85041 AUTOMATED RBC COUNT: CPT | Performed by: PSYCHIATRY & NEUROLOGY

## 2022-07-01 PROCEDURE — 124N000003 HC R&B MH SENIOR/ADOLESCENT

## 2022-07-01 PROCEDURE — 99231 SBSQ HOSP IP/OBS SF/LOW 25: CPT | Performed by: PSYCHIATRY & NEUROLOGY

## 2022-07-01 PROCEDURE — 36415 COLL VENOUS BLD VENIPUNCTURE: CPT | Performed by: PSYCHIATRY & NEUROLOGY

## 2022-07-01 RX ADMIN — BUSPIRONE HYDROCHLORIDE 30 MG: 15 TABLET ORAL at 08:52

## 2022-07-01 RX ADMIN — ASPIRIN 81 MG: 81 TABLET, COATED ORAL at 08:53

## 2022-07-01 RX ADMIN — SALMETEROL XINAFOATE 1 PUFF: 50 POWDER, METERED ORAL; RESPIRATORY (INHALATION) at 08:53

## 2022-07-01 RX ADMIN — BENZTROPINE MESYLATE 0.5 MG: 0.5 TABLET ORAL at 08:52

## 2022-07-01 RX ADMIN — BENZTROPINE MESYLATE 0.5 MG: 0.5 TABLET ORAL at 21:44

## 2022-07-01 RX ADMIN — GABAPENTIN 100 MG: 100 CAPSULE ORAL at 08:53

## 2022-07-01 RX ADMIN — MIRTAZAPINE 15 MG: 15 TABLET, FILM COATED ORAL at 21:50

## 2022-07-01 RX ADMIN — HALOPERIDOL 2 MG: 1 TABLET ORAL at 21:45

## 2022-07-01 RX ADMIN — GABAPENTIN 100 MG: 100 CAPSULE ORAL at 13:09

## 2022-07-01 RX ADMIN — SALMETEROL XINAFOATE 1 PUFF: 50 POWDER, METERED ORAL; RESPIRATORY (INHALATION) at 21:50

## 2022-07-01 RX ADMIN — MEMANTINE 10 MG: 10 TABLET ORAL at 08:53

## 2022-07-01 RX ADMIN — CLOZAPINE 225 MG: 100 TABLET ORAL at 21:45

## 2022-07-01 RX ADMIN — DONEPEZIL HYDROCHLORIDE 10 MG: 10 TABLET ORAL at 21:45

## 2022-07-01 RX ADMIN — Medication 5 MG: at 21:45

## 2022-07-01 RX ADMIN — LEVOTHYROXINE SODIUM 88 MCG: 0.09 TABLET ORAL at 08:52

## 2022-07-01 RX ADMIN — BUSPIRONE HYDROCHLORIDE 30 MG: 15 TABLET ORAL at 21:44

## 2022-07-01 RX ADMIN — MEMANTINE 10 MG: 10 TABLET ORAL at 21:50

## 2022-07-01 RX ADMIN — GABAPENTIN 100 MG: 100 CAPSULE ORAL at 17:48

## 2022-07-01 RX ADMIN — MEGESTROL ACETATE 20 MG: 20 TABLET ORAL at 08:52

## 2022-07-01 ASSESSMENT — ACTIVITIES OF DAILY LIVING (ADL)
ADLS_ACUITY_SCORE: 92
ADLS_ACUITY_SCORE: 96
ADLS_ACUITY_SCORE: 92
ADLS_ACUITY_SCORE: 92
ADLS_ACUITY_SCORE: 96
DRESS: WITH ASSISTANCE;PROMPTS
ADLS_ACUITY_SCORE: 92
ADLS_ACUITY_SCORE: 96
ADLS_ACUITY_SCORE: 92

## 2022-07-01 NOTE — PLAN OF CARE
Problem: Sleep Disturbance (Psychotic Signs/Symptoms)  Goal: Improved Sleep (Psychotic Signs/Symptoms)  Outcome: Ongoing, Not Progressing   Goal Outcome Evaluation:    Patient slept for a total of 9 hours. No behavioral issues raised the whole shift.

## 2022-07-01 NOTE — PLAN OF CARE
Problem: Fall Injury Risk  Goal: Absence of Fall and Fall-Related Injury  Intervention: Promote Injury-Free Environment  Recent Flowsheet Documentation  Taken 6/30/2022 1600 by Jocelyne Gaffney RN  Safety Promotion/Fall Prevention:    activity supervised    check orthostatic blood pressure     Problem: Suicide Risk  Goal: Absence of Self-Harm  Outcome: Ongoing, Progressing   Goal Outcome Evaluation:    Plan of Care Reviewed With: patient     Patient Alert and oriented to self. Pt  with blunted and flat affect,  isolated in his most of this  room but out for meals. Rated depression 2/10. Denies anxiety, HI/SI/SIB. No c/o pain. Tolerated all meal and medications well. Had 75% of his dinner. Encourage pt to attend groups and allow assistance with hygiene.  Will continue to monitor.

## 2022-07-01 NOTE — PROGRESS NOTES
"PSYCHIATRY  PROGRESS NOTE     DATE OF SERVICE   07/01/2022       CHIEF COMPLAINT   Patient was admitted due to inability to safely care for himself and psychosis.       SUBJECTIVE   Nursing reports:   Patient evaluation continues. Assessed mood,anxiety,thoughts and behavior. Patient is  progressing towards goals. Patient is encouraged to participate in groups and assisted to develop healthy coping skills.  Patient admits to auditory hallucinations. /72   Pulse 100   Temp 97.6  F (36.4  C) (Temporal)   Resp 17   Ht 1.778 m (5' 10\")   Wt 77.1 kg (170 lb)   SpO2 95%   BMI 24.39 kg/m      Patient has been discussed in detail with the  during team meeting.  We continue to have no updates on the guardianship process.     OBJECTIVE   Patient was seen and evaluated at bedside by himself, this was a face-to-face evaluation.  Patient continues to be at his baseline and he has not change over the last few weeks.  Remains calm and pleasant during the assessment.  Is constantly responding to internal stimuli and remains delusional.  Denies thoughts of harming himself but is shayan for safety.       MEDICATIONS   Medications:  Scheduled Meds:    aspirin  81 mg Oral Daily     benztropine  0.5 mg Oral BID     busPIRone HCl  30 mg Oral BID     cloZAPine  225 mg Oral At Bedtime     donepezil  10 mg Oral At Bedtime     gabapentin  100 mg Oral TID w/meals     haloperidol  2 mg Oral At Bedtime     levothyroxine  88 mcg Oral Daily     megestrol  20 mg Oral Daily     melatonin  5 mg Oral At Bedtime     memantine  10 mg Oral BID     mirtazapine  15 mg Oral At Bedtime     salmeterol  1 puff Inhalation BID     Continuous Infusions:  PRN Meds:.albuterol, alum & mag hydroxide-simethicone, benzocaine-menthol, hydrOXYzine, nicotine, nystatin, polyethylene glycol, polyethylene glycol-propylene glycol PF, senna-docusate    Medication adherence issues: MS Med Adherence Y/N: Yes, Hospitalization  Medication side " "effects: MEDICATION SIDE EFFECTS: no side effects reported  Benefit: Yes / No: Yes       ROS   A comprehensive review of systems was negative.       MENTAL STATUS EXAM   Vitals: /72   Pulse 100   Temp 97.6  F (36.4  C) (Temporal)   Resp 17   Ht 1.778 m (5' 10\")   Wt 77.1 kg (170 lb)   SpO2 95%   BMI 24.39 kg/m       Same as last visit   Appearance:  No apparent distress  Mood: \"I am okay\"  Affect: Blunted  Suicidal Ideation: Denies  Homicidal Ideation: Denies  Thought process: Disorganized  Thought content: Remains confused and delusional.  Continues to be internally preoccupied and actively hallucinating.  Fund of Knowledge: Below average  Attention/Concentration: Poor  Language ability: Significantly impaired  Memory: Global memory impairment  Insight:  Poor.  Judgement: Poor  Orientation: Only to person  Psychomotor Behavior: slowed    Muscle Strength and Tone: MuscleStrength: Normal and Atrophy  Gait and Station: Not evaluated       LABS   personally reviewed.     No results found for: PHENYTOIN, PHENOBARB, VALPROATE, CBMZ       DIAGNOSIS   Principal Problem:    Major neurocognitive disorder, due to multiple etiologies, with behavioral disturbance, severe (H)    Active Problem List:  Patient Active Problem List   Diagnosis     TBI with aggressive behavior     HTN (hypertension)     COPD (chronic obstructive pulmonary disease) (H)     Dementia with behavioral disturbance (H)     Chronic schizophrenia (H)     Smoker     Agitation     Behavior disturbance     Psychosis (H)     Major neurocognitive disorder, due to multiple etiologies, with behavioral disturbance, severe (H)     Paranoid schizophrenia, chronic condition with acute exacerbation (H)     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 Ongoing education given regarding " diagnostic and treatment options with risks, benefits and alternatives and adequate verbalization of understanding.  2.  Medications       BuSpar 30 mg 2 times daily       Cogentin 0.5 mg 2 times a day       Clozaril 225 mg at bedtime       Aricept 10 mg at bedtime       Gabapentin 100 mg 3 times a day       Haldol 2 mg at bedtime       Namenda 10 mg 2 times a day       Remeron 15 mg at bedtime       melatonin 5 mg at bedtime  3.  Medical team following the patient   4.   coordinating a safe discharge plan with the patient.    CBC today is within normal limits.  Risk Assessment: [facility] RISK ASSESSMENT: Patient able to contract for safety    Coordination of Care:   Treatment Plan reviewed and physician signed, Care discussed with Care/Treatment Team Members, Chart reviewed and Patient seen      Re-Certification I certify that the inpatient psychiatric facility services furnished since the previous certification were, and continue to be, medically necessary for, either, treatment which could reasonably be expected to improve the patient s condition or diagnostic study and that the hospital records indicate that the services furnished were, either, intensive treatment services, admission and related services necessary for diagnostic study, or equivalent services.     I certify that the patient continues to need, on a daily basis, active treatment furnished directly by or requiring the supervision of inpatient psychiatric facility personnel.   I estimate 14 days of hospitalization is necessary for proper treatment of the patient. My plans for post-hospital care for this patient are  TBD     Ginger Dubon MD    -     07/01/2022  -     4:04 PM    Total time 15 minutes with > 50%spent on coordination of cares and psycho-education.    This note was created with help of Dragon dictation system. Grammatical / typing errors are not intentional.    Ginger Dubon MD

## 2022-07-01 NOTE — PLAN OF CARE
"  Problem: Behavior Regulation Impairment (Psychotic Signs/Symptoms)  Goal: Improved Behavioral Control (Psychotic Signs/Symptoms)  Outcome: Met   Goal Outcome Evaluation:    Plan of Care Reviewed With: patient               Nursing Assessment    Psychosis (H) [F29]    Admit Date: 1/26/2022    Length of Stay: 156    Patient evaluation continues. Assessed mood,anxiety,thoughts and behavior. Patient is  progressing towards goals. Patient is encouraged to participate in groups and assisted to develop healthy coping skills.  Patient admits to auditory hallucinations. /72   Pulse 100   Temp 97.6  F (36.4  C) (Temporal)   Resp 17   Ht 1.778 m (5' 10\")   Wt 77.1 kg (170 lb)   SpO2 95%   BMI 24.39 kg/m      Mood: ok    Patient reports depression none and reports anxiety none    Affect:flat blunted    Sleep: slept 9 hours last night    Appetite: good    SI: denies    HI: denies    SIB: denies      Medication Compliance: yes    Group participation:not unless it is bingo    ADL's: assisted by staff, refuses at times    Fall risk interventions: proper foot wear, orthostatic B/P    Rinku Score Interventions: none    Discharge planning in process    Refer to daily team meeting notes for individualized plan of care. Nursing will continue to assess.    *Scale is 1-10 and 10 is the worst.         "

## 2022-07-02 PROCEDURE — 250N000013 HC RX MED GY IP 250 OP 250 PS 637: Performed by: PSYCHIATRY & NEUROLOGY

## 2022-07-02 PROCEDURE — 124N000003 HC R&B MH SENIOR/ADOLESCENT

## 2022-07-02 RX ADMIN — GABAPENTIN 100 MG: 100 CAPSULE ORAL at 17:20

## 2022-07-02 RX ADMIN — ASPIRIN 81 MG: 81 TABLET, COATED ORAL at 08:28

## 2022-07-02 RX ADMIN — MIRTAZAPINE 15 MG: 15 TABLET, FILM COATED ORAL at 19:55

## 2022-07-02 RX ADMIN — MEMANTINE 10 MG: 10 TABLET ORAL at 19:55

## 2022-07-02 RX ADMIN — DONEPEZIL HYDROCHLORIDE 10 MG: 10 TABLET ORAL at 19:55

## 2022-07-02 RX ADMIN — SALMETEROL XINAFOATE 1 PUFF: 50 POWDER, METERED ORAL; RESPIRATORY (INHALATION) at 19:55

## 2022-07-02 RX ADMIN — GABAPENTIN 100 MG: 100 CAPSULE ORAL at 11:43

## 2022-07-02 RX ADMIN — BUSPIRONE HYDROCHLORIDE 30 MG: 15 TABLET ORAL at 19:55

## 2022-07-02 RX ADMIN — BUSPIRONE HYDROCHLORIDE 30 MG: 15 TABLET ORAL at 08:28

## 2022-07-02 RX ADMIN — CLOZAPINE 225 MG: 100 TABLET ORAL at 19:55

## 2022-07-02 RX ADMIN — MEGESTROL ACETATE 20 MG: 20 TABLET ORAL at 08:28

## 2022-07-02 RX ADMIN — BENZTROPINE MESYLATE 0.5 MG: 0.5 TABLET ORAL at 08:27

## 2022-07-02 RX ADMIN — GABAPENTIN 100 MG: 100 CAPSULE ORAL at 08:27

## 2022-07-02 RX ADMIN — LEVOTHYROXINE SODIUM 88 MCG: 0.09 TABLET ORAL at 08:28

## 2022-07-02 RX ADMIN — SALMETEROL XINAFOATE 1 PUFF: 50 POWDER, METERED ORAL; RESPIRATORY (INHALATION) at 08:32

## 2022-07-02 RX ADMIN — MEMANTINE 10 MG: 10 TABLET ORAL at 08:28

## 2022-07-02 RX ADMIN — BENZTROPINE MESYLATE 0.5 MG: 0.5 TABLET ORAL at 19:55

## 2022-07-02 RX ADMIN — Medication 5 MG: at 19:55

## 2022-07-02 RX ADMIN — HALOPERIDOL 2 MG: 1 TABLET ORAL at 19:55

## 2022-07-02 ASSESSMENT — ACTIVITIES OF DAILY LIVING (ADL)
HYGIENE/GROOMING: WITH ASSISTANCE;PROMPTS
ADLS_ACUITY_SCORE: 96
DRESS: WITH ASSISTANCE;PROMPTS
DRESS: WITH ASSISTANCE;PROMPTS
ORAL_HYGIENE: PROMPTS
ADLS_ACUITY_SCORE: 96
HYGIENE/GROOMING: WITH ASSISTANCE;PROMPTS
ORAL_HYGIENE: WITH ASSISTANCE;PROMPTS
ADLS_ACUITY_SCORE: 96
ADLS_ACUITY_SCORE: 97
ADLS_ACUITY_SCORE: 97
LAUNDRY: UNABLE TO COMPLETE
ADLS_ACUITY_SCORE: 96
LAUNDRY: UNABLE TO COMPLETE
ADLS_ACUITY_SCORE: 97
ADLS_ACUITY_SCORE: 96
ADLS_ACUITY_SCORE: 97

## 2022-07-02 NOTE — PLAN OF CARE
Problem: Sleep Disturbance (Psychotic Signs/Symptoms)  Goal: Improved Sleep (Psychotic Signs/Symptoms)  Outcome: Ongoing, Progressing   Goal Outcome Evaluation:    Patient slept a total of 12.25 hours with no behavioral concerns noted the whole shift.

## 2022-07-02 NOTE — PLAN OF CARE
"Goal Outcome Evaluation:    Plan of Care Reviewed With: patient     Patient appeared calm, flat, withdrawn and isolative to room. He was assisted with bed bath and encouraged to come out for meals which he did. When asked about his mood he stated 'I am fine\". Patient ate very little of breakfast and most of his lunch. Patient is complaint with medications and denies any pain or discomfort.             "

## 2022-07-02 NOTE — PLAN OF CARE
"  Problem: Behavior Regulation Impairment (Psychotic Signs/Symptoms)  Goal: Improved Behavioral Control (Psychotic Signs/Symptoms)  7/1/2022 7428 by Wen Broderick, RN  Outcome: Ongoing, Progressing  7/1/2022 1259 by Wen Broderick, RN  Outcome: Met   Goal Outcome Evaluation:    Plan of Care Reviewed With: patient   Nursing Assessment    Psychosis (H) [F29]    Admit Date: 1/26/2022    Length of Stay: 156    Patient evaluation continues. Assessed mood,anxiety,thoughts and behavior. Patient is progressing towards goals. Patient is encouraged to participate in groups and assisted to develop healthy coping skills.  Patient denies auditory or visual hallucinations this shift. /73 (BP Location: Left arm, Patient Position: Sitting)   Pulse 99   Temp 98.3  F (36.8  C) (Oral)   Resp 16   Ht 1.778 m (5' 10\")   Wt 77.1 kg (170 lb)   SpO2 98%   BMI 24.39 kg/m      Mood: ok    Patient reports depression will not reply to question asked and reports anxiety some    Affect:flat blunted    Sleep: 5 hours, napped during the day    Appetite: good    SI: denies    HI: denies    SIB: denies      Medication Compliance yes    Group participation:no    ADL's: assist    Fall risk interventions: proper foot wear, orthostatic B/P standby assist    Rinku Score Interventions:none    Discharge planning in process    Refer to daily team meeting notes for individualized plan of care. Nursing will continue to assess.    *Scale is 1-10 and 10 is the worst.                     "

## 2022-07-02 NOTE — PLAN OF CARE
Problem: Behavioral Health Plan of Care  Goal: Adheres to Safety Considerations for Self and Others  Outcome: Ongoing, Progressing  Intervention: Develop and Maintain Individualized Safety Plan  Recent Flowsheet Documentation  Taken 7/2/2022 3130 by Dann Maldonado, RN  Safety Measures:    environmental rounds completed    safety rounds completed    suicide check-in completed       Plan of Care Reviewed With: patient    Pt calm, cooperative, denies SI/SIB, denies anxiety/depression. Affect flat, intermittent eye contact, withdrawn and isolative. Voice soft at times hard to understand.   Offered fluids, requested orange juice, tolerating fluids.    Pt denies any pain, came out to eat dinner, appetite fair, ate 25% of dinner. Pt medication compliant.

## 2022-07-03 PROCEDURE — 250N000013 HC RX MED GY IP 250 OP 250 PS 637: Performed by: PSYCHIATRY & NEUROLOGY

## 2022-07-03 PROCEDURE — 124N000003 HC R&B MH SENIOR/ADOLESCENT

## 2022-07-03 RX ADMIN — ASPIRIN 81 MG: 81 TABLET, COATED ORAL at 08:06

## 2022-07-03 RX ADMIN — GABAPENTIN 100 MG: 100 CAPSULE ORAL at 17:11

## 2022-07-03 RX ADMIN — MEGESTROL ACETATE 20 MG: 20 TABLET ORAL at 08:06

## 2022-07-03 RX ADMIN — MEMANTINE 10 MG: 10 TABLET ORAL at 08:06

## 2022-07-03 RX ADMIN — Medication 5 MG: at 20:16

## 2022-07-03 RX ADMIN — SALMETEROL XINAFOATE 1 PUFF: 50 POWDER, METERED ORAL; RESPIRATORY (INHALATION) at 08:11

## 2022-07-03 RX ADMIN — CLOZAPINE 225 MG: 100 TABLET ORAL at 20:15

## 2022-07-03 RX ADMIN — GABAPENTIN 100 MG: 100 CAPSULE ORAL at 12:00

## 2022-07-03 RX ADMIN — BENZTROPINE MESYLATE 0.5 MG: 0.5 TABLET ORAL at 20:15

## 2022-07-03 RX ADMIN — BUSPIRONE HYDROCHLORIDE 30 MG: 15 TABLET ORAL at 08:06

## 2022-07-03 RX ADMIN — BENZTROPINE MESYLATE 0.5 MG: 0.5 TABLET ORAL at 08:06

## 2022-07-03 RX ADMIN — GABAPENTIN 100 MG: 100 CAPSULE ORAL at 08:07

## 2022-07-03 RX ADMIN — SALMETEROL XINAFOATE 1 PUFF: 50 POWDER, METERED ORAL; RESPIRATORY (INHALATION) at 20:15

## 2022-07-03 RX ADMIN — MIRTAZAPINE 15 MG: 15 TABLET, FILM COATED ORAL at 20:16

## 2022-07-03 RX ADMIN — MEMANTINE 10 MG: 10 TABLET ORAL at 20:16

## 2022-07-03 RX ADMIN — HALOPERIDOL 2 MG: 1 TABLET ORAL at 20:16

## 2022-07-03 RX ADMIN — BUSPIRONE HYDROCHLORIDE 30 MG: 15 TABLET ORAL at 20:15

## 2022-07-03 RX ADMIN — LEVOTHYROXINE SODIUM 88 MCG: 0.09 TABLET ORAL at 08:06

## 2022-07-03 RX ADMIN — DONEPEZIL HYDROCHLORIDE 10 MG: 10 TABLET ORAL at 20:15

## 2022-07-03 ASSESSMENT — ACTIVITIES OF DAILY LIVING (ADL)
ADLS_ACUITY_SCORE: 97
ADLS_ACUITY_SCORE: 97
LAUNDRY: UNABLE TO COMPLETE
ORAL_HYGIENE: PROMPTS
ADLS_ACUITY_SCORE: 97
DRESS: WITH ASSISTANCE;SCRUBS (BEHAVIORAL HEALTH)
ADLS_ACUITY_SCORE: 97
HYGIENE/GROOMING: WITH ASSISTANCE
ADLS_ACUITY_SCORE: 97

## 2022-07-03 NOTE — PLAN OF CARE
Problem: Behavioral Health Plan of Care  Goal: Adheres to Safety Considerations for Self and Others  Outcome: Ongoing, Progressing  Intervention: Develop and Maintain Individualized Safety Plan  Recent Flowsheet Documentation  Taken 7/3/2022 1802 by Dann Maldonado, RN  Safety Measures:    environmental rounds completed    safety rounds completed    suicide check-in completed     Plan of Care Reviewed With: patient    Pt calm, cooperative, denies depression/anxiety. Affect flat, intermittent eye contact. Withdrawn and isolative. Voice soft  Pt denies any pain, came out for dinner, ate 25% of his dinner. Pt medication compliant.

## 2022-07-03 NOTE — PLAN OF CARE
Problem: Sleep Disturbance (Psychotic Signs/Symptoms)  Goal: Improved Sleep (Psychotic Signs/Symptoms)  Outcome: Ongoing, Progressing   Goal Outcome Evaluation:    Patient slept a total of 12.25 hours without any interruptions. No behavioral concerns raised the whole shift.

## 2022-07-03 NOTE — PLAN OF CARE
Goal Outcome Evaluation:    Plan of Care Reviewed With: patient     Patient is calm, isolative to room and withdrawn. He came out for meals with staff encouragement, denies any concerns. Denies any concerns and is complaint with medications.

## 2022-07-04 PROCEDURE — 250N000013 HC RX MED GY IP 250 OP 250 PS 637: Performed by: PSYCHIATRY & NEUROLOGY

## 2022-07-04 PROCEDURE — 124N000003 HC R&B MH SENIOR/ADOLESCENT

## 2022-07-04 RX ADMIN — MIRTAZAPINE 15 MG: 15 TABLET, FILM COATED ORAL at 20:05

## 2022-07-04 RX ADMIN — MEMANTINE 10 MG: 10 TABLET ORAL at 20:05

## 2022-07-04 RX ADMIN — BENZTROPINE MESYLATE 0.5 MG: 0.5 TABLET ORAL at 08:10

## 2022-07-04 RX ADMIN — SALMETEROL XINAFOATE 1 PUFF: 50 POWDER, METERED ORAL; RESPIRATORY (INHALATION) at 20:06

## 2022-07-04 RX ADMIN — BUSPIRONE HYDROCHLORIDE 30 MG: 15 TABLET ORAL at 08:10

## 2022-07-04 RX ADMIN — DONEPEZIL HYDROCHLORIDE 10 MG: 10 TABLET ORAL at 20:05

## 2022-07-04 RX ADMIN — Medication 5 MG: at 20:05

## 2022-07-04 RX ADMIN — BENZTROPINE MESYLATE 0.5 MG: 0.5 TABLET ORAL at 20:05

## 2022-07-04 RX ADMIN — CLOZAPINE 225 MG: 100 TABLET ORAL at 20:05

## 2022-07-04 RX ADMIN — ASPIRIN 81 MG: 81 TABLET, COATED ORAL at 08:10

## 2022-07-04 RX ADMIN — GABAPENTIN 100 MG: 100 CAPSULE ORAL at 12:47

## 2022-07-04 RX ADMIN — MEMANTINE 10 MG: 10 TABLET ORAL at 08:10

## 2022-07-04 RX ADMIN — MEGESTROL ACETATE 20 MG: 20 TABLET ORAL at 08:10

## 2022-07-04 RX ADMIN — GABAPENTIN 100 MG: 100 CAPSULE ORAL at 17:54

## 2022-07-04 RX ADMIN — HALOPERIDOL 2 MG: 1 TABLET ORAL at 20:05

## 2022-07-04 RX ADMIN — BUSPIRONE HYDROCHLORIDE 30 MG: 15 TABLET ORAL at 20:05

## 2022-07-04 RX ADMIN — LEVOTHYROXINE SODIUM 88 MCG: 0.09 TABLET ORAL at 08:10

## 2022-07-04 RX ADMIN — GABAPENTIN 100 MG: 100 CAPSULE ORAL at 08:10

## 2022-07-04 RX ADMIN — SALMETEROL XINAFOATE 1 PUFF: 50 POWDER, METERED ORAL; RESPIRATORY (INHALATION) at 08:12

## 2022-07-04 ASSESSMENT — ACTIVITIES OF DAILY LIVING (ADL)
ADLS_ACUITY_SCORE: 77
ADLS_ACUITY_SCORE: 97
LAUNDRY: WITH SUPERVISION
HYGIENE/GROOMING: INDEPENDENT
ORAL_HYGIENE: INDEPENDENT
ADLS_ACUITY_SCORE: 77
DRESS: INDEPENDENT
ADLS_ACUITY_SCORE: 77
ADLS_ACUITY_SCORE: 97
ADLS_ACUITY_SCORE: 77
ADLS_ACUITY_SCORE: 77

## 2022-07-04 NOTE — CARE PLAN
Patient had a witnessed fall in the OT room. Patient went into OT room to play bingo, when he reach his sit he become unsteady and had a fall. Two PA's was next to the patient before the incident and were able to guide him to the floor.

## 2022-07-04 NOTE — SIGNIFICANT EVENT
Patient had a witness assisted fall during group activity. Patient was seen loosing balance and was guided to the floor. Patient did not hit his head, denies any injuries, denies feeling dizzy. Patient's vitals within normal limits, On call IM provider notified, plan to continue to monitor.

## 2022-07-04 NOTE — PLAN OF CARE
"Goal Outcome Evaluation:    Plan of Care Reviewed With: patient     Patient continues to isolate in his room sleeping most of the shift. He reports being in \"OK\" mood. He denies depression, anxiety, hallucinations, pain and SI/SIB. He appears confused, disorganized and disoriented to situation. He came out for meals with staff encouragement, appears to be eating and drinking adequately and is complaint with medications.                 "

## 2022-07-04 NOTE — PLAN OF CARE
Problem: Sleep Disturbance (Psychotic Signs/Symptoms)  Goal: Improved Sleep (Psychotic Signs/Symptoms)  Outcome: Ongoing, Progressing   Goal Outcome Evaluation:    Patient slept a total of 10  hours. No behavioral issues raised the whole shift.

## 2022-07-05 PROCEDURE — 250N000013 HC RX MED GY IP 250 OP 250 PS 637: Performed by: PSYCHIATRY & NEUROLOGY

## 2022-07-05 PROCEDURE — 99231 SBSQ HOSP IP/OBS SF/LOW 25: CPT | Performed by: PSYCHIATRY & NEUROLOGY

## 2022-07-05 PROCEDURE — 124N000003 HC R&B MH SENIOR/ADOLESCENT

## 2022-07-05 RX ADMIN — BENZTROPINE MESYLATE 0.5 MG: 0.5 TABLET ORAL at 09:03

## 2022-07-05 RX ADMIN — GABAPENTIN 100 MG: 100 CAPSULE ORAL at 18:06

## 2022-07-05 RX ADMIN — GABAPENTIN 100 MG: 100 CAPSULE ORAL at 09:03

## 2022-07-05 RX ADMIN — CLOZAPINE 225 MG: 100 TABLET ORAL at 20:28

## 2022-07-05 RX ADMIN — MEMANTINE 10 MG: 10 TABLET ORAL at 20:29

## 2022-07-05 RX ADMIN — ASPIRIN 81 MG: 81 TABLET, COATED ORAL at 09:03

## 2022-07-05 RX ADMIN — GABAPENTIN 100 MG: 100 CAPSULE ORAL at 11:52

## 2022-07-05 RX ADMIN — Medication 5 MG: at 20:29

## 2022-07-05 RX ADMIN — BUSPIRONE HYDROCHLORIDE 30 MG: 15 TABLET ORAL at 20:28

## 2022-07-05 RX ADMIN — LEVOTHYROXINE SODIUM 88 MCG: 0.09 TABLET ORAL at 09:03

## 2022-07-05 RX ADMIN — SALMETEROL XINAFOATE 1 PUFF: 50 POWDER, METERED ORAL; RESPIRATORY (INHALATION) at 23:43

## 2022-07-05 RX ADMIN — MEGESTROL ACETATE 20 MG: 20 TABLET ORAL at 09:03

## 2022-07-05 RX ADMIN — DONEPEZIL HYDROCHLORIDE 10 MG: 10 TABLET ORAL at 20:28

## 2022-07-05 RX ADMIN — BUSPIRONE HYDROCHLORIDE 30 MG: 15 TABLET ORAL at 09:02

## 2022-07-05 RX ADMIN — BENZTROPINE MESYLATE 0.5 MG: 0.5 TABLET ORAL at 20:28

## 2022-07-05 RX ADMIN — MEMANTINE 10 MG: 10 TABLET ORAL at 09:03

## 2022-07-05 RX ADMIN — MIRTAZAPINE 15 MG: 15 TABLET, FILM COATED ORAL at 23:41

## 2022-07-05 RX ADMIN — HALOPERIDOL 2 MG: 1 TABLET ORAL at 20:29

## 2022-07-05 RX ADMIN — SALMETEROL XINAFOATE 1 PUFF: 50 POWDER, METERED ORAL; RESPIRATORY (INHALATION) at 10:16

## 2022-07-05 ASSESSMENT — ACTIVITIES OF DAILY LIVING (ADL)
ADLS_ACUITY_SCORE: 77
ADLS_ACUITY_SCORE: 73
ADLS_ACUITY_SCORE: 77
ADLS_ACUITY_SCORE: 73

## 2022-07-05 NOTE — PLAN OF CARE
07/05/22 1813   Individualization/Patient Specific Goals   Patient Personal Strengths resilient   Patient Vulnerabilities lacks insight into illness   Anxieties, Fears or Concerns Patient continues to report delusions of owning a mansion.   Individualized Care Needs Patient is waiting on  ruling for his guardianship case with the [facility].   Patient-Specific Goals (Include Timeframe) 10-14 days, team is waiting for the  to rule on guardianship case. Patient lacks the capacity to sign into nursing home or other placement. [facility] is refusing to complete [place] Choice Assessment until guardianship has been determined. Court was on 6/9. Team and patient continue to wait for 's ruling.   Interprofessional Rounds   Summary Patient continues to stabilize at baseline. Team is waiting on 's ruling regarding patient's guardianship case.   Participants nursing;psychiatrist;CTC;OT   Team Discussion   Participants Dr. Root, OT, RN, CTC   Progress Stabilized at baseline   Anticipated length of stay 7-14 days   Continued Stay Criteria/Rationale Patient is not able to discharge to a safe environment at this time, due to his lack of capacity and guardianship court trial (waiting on the 's ruling).   Medical/Physical Please see H and P.   Plan Continue with plan: patient to receive medication management, psychiatric evaluations, nursing assessments, group therapy, milieu therapy, and CTC case management. Patient has placement coordinated, waiting on guardianship ruling.   Safety Plan s-15   Anticipated Discharge Disposition assisted living     PRECAUTIONS AND SAFETY    Behavioral Orders   Procedures    Code 1 - Restrict to Unit    Code 2     CT scan only    Code 3     Patient can go out of the unit with staff supervision. He needs to be taken out by him self with 2 staff and security present.    Fall precautions    Routine Programming     As clinically indicated    Status 15     Every 15 minutes.        Safety  Safety WDL: WDL  Patient Location: patient room, own  Observed Behavior: calm, sleeping  Observed Behavior (Comment): napping  Safety Measures: environmental rounds completed, safety rounds completed, suicide check-in completed  Diversional Activity: other (see comments) (none)  Suicidality: Status 15, Behavioral scrubs (pajamas), Minimal furniture in room, Minimal personal belongings in room, Unpredictable frequency of checking on patient, Optimize communication / relationship to minimize opportunity for self-harm, Promote patient engagement with treatment process  Seizure precautions: calm, consistent lighting  Assault: status 15  Elopement Assessment: Statements about wanting to leave (wants to get out to smoke)  Elopement Interventions: status 15  Sexual: status 15

## 2022-07-05 NOTE — PLAN OF CARE
Problem: Sleep Disturbance (Psychotic Signs/Symptoms)  Goal: Improved Sleep (Psychotic Signs/Symptoms)  Outcome: Ongoing, Progressing   Goal Outcome Evaluation:        Slept well for 9.75 hours  No behavioral issues encountered this shift  Stayed in bed all night.

## 2022-07-05 NOTE — PLAN OF CARE
"  Problem: Behavioral Health Plan of Care  Goal: Plan of Care Review  Outcome: Ongoing, Progressing   Goal Outcome Evaluation:    Patient remains calm, quiet and isolative to his room. Lies down in bed most of the time. Responds to questions with one word responses and does not elaborate in details. Looks disheveled and refused to take a shower. Appears to be depressed with a flat and blunt affect, but denies any depression at this time. Denies SI/SIB and hallucinations. He said, \" I'm feeling ok today.\" He looked relaxed and calm, and no signs of anxiety were noted. Denies feeling anxious and having any racing thoughts.\" He said, \" I am good today.\" Verbalized no concerns about his care at this time. He claims that he has been sleeping well every night. Appetite has been fair.     Patient was seated up in bed and when he saw staff passing through, he requested for a sandwich. It was not available, and yogurt, crackers and orange juice were offered and he gladly accepted it.     Patient was encouraged to attend groups today, but refused to do so. Consumed his whole share of dinner and went back to his room after eating. Laid down in bed and refused to join the music therapy. Took all his bedtime medications and went to sleep @ past 9pm.                       "

## 2022-07-05 NOTE — PLAN OF CARE
Goal Outcome Evaluation:    Plan of Care Reviewed With: patient     Patient continues to isolate to his room only coming for meals with prompting. He attended one of the unit activities with staff encouragement but only stayed for short time. He appears confused, disorganized and disheveled. He declined encouragement to shower. Patient ate about 50% of his meal and snacks, he is complaint with medications, denies depression, anxiety and SI/SIB.

## 2022-07-05 NOTE — PLAN OF CARE
Goal Outcome Evaluation:    Plan of Care Reviewed With: patient     Overall Patient Progress: no change         Pt remains isolative to room, flat affect, communicates in a whisper, delayed responses and slow speech. Pt endorses depression 4/10 and anxiety 2/10. Pt denies SI. Pt has foul body odor and declined assistance to provide hygiene and declined to shower. Pt states that he would like to go home. Attempted to discuss with patient the objective signs of severe depression and a plan of care to assist patient with improved mood and ability to care for self however patient was unreceptive to conversation and did not participate.

## 2022-07-06 PROCEDURE — 250N000013 HC RX MED GY IP 250 OP 250 PS 637: Performed by: PSYCHIATRY & NEUROLOGY

## 2022-07-06 PROCEDURE — 99231 SBSQ HOSP IP/OBS SF/LOW 25: CPT | Performed by: PSYCHIATRY & NEUROLOGY

## 2022-07-06 PROCEDURE — 124N000003 HC R&B MH SENIOR/ADOLESCENT

## 2022-07-06 RX ADMIN — CLOZAPINE 225 MG: 100 TABLET ORAL at 20:43

## 2022-07-06 RX ADMIN — BUSPIRONE HYDROCHLORIDE 30 MG: 15 TABLET ORAL at 08:48

## 2022-07-06 RX ADMIN — MEGESTROL ACETATE 20 MG: 20 TABLET ORAL at 08:48

## 2022-07-06 RX ADMIN — GABAPENTIN 100 MG: 100 CAPSULE ORAL at 12:24

## 2022-07-06 RX ADMIN — BENZTROPINE MESYLATE 0.5 MG: 0.5 TABLET ORAL at 20:43

## 2022-07-06 RX ADMIN — BENZTROPINE MESYLATE 0.5 MG: 0.5 TABLET ORAL at 08:48

## 2022-07-06 RX ADMIN — GABAPENTIN 100 MG: 100 CAPSULE ORAL at 08:48

## 2022-07-06 RX ADMIN — Medication 5 MG: at 20:43

## 2022-07-06 RX ADMIN — MEMANTINE 10 MG: 10 TABLET ORAL at 20:43

## 2022-07-06 RX ADMIN — MEMANTINE 10 MG: 10 TABLET ORAL at 08:48

## 2022-07-06 RX ADMIN — SALMETEROL XINAFOATE 1 PUFF: 50 POWDER, METERED ORAL; RESPIRATORY (INHALATION) at 20:44

## 2022-07-06 RX ADMIN — ASPIRIN 81 MG: 81 TABLET, COATED ORAL at 08:48

## 2022-07-06 RX ADMIN — MIRTAZAPINE 15 MG: 15 TABLET, FILM COATED ORAL at 20:43

## 2022-07-06 RX ADMIN — BUSPIRONE HYDROCHLORIDE 30 MG: 15 TABLET ORAL at 20:43

## 2022-07-06 RX ADMIN — HALOPERIDOL 2 MG: 1 TABLET ORAL at 20:43

## 2022-07-06 RX ADMIN — GABAPENTIN 100 MG: 100 CAPSULE ORAL at 17:37

## 2022-07-06 RX ADMIN — DONEPEZIL HYDROCHLORIDE 10 MG: 10 TABLET ORAL at 20:43

## 2022-07-06 RX ADMIN — LEVOTHYROXINE SODIUM 88 MCG: 0.09 TABLET ORAL at 08:48

## 2022-07-06 RX ADMIN — SALMETEROL XINAFOATE 1 PUFF: 50 POWDER, METERED ORAL; RESPIRATORY (INHALATION) at 08:48

## 2022-07-06 ASSESSMENT — ACTIVITIES OF DAILY LIVING (ADL)
ADLS_ACUITY_SCORE: 73

## 2022-07-06 NOTE — PROGRESS NOTES
[facility]    Medicine Progress Note - Hospitalist Service  Date of Admission:  9/9/2020    When I evaluated patient: 02/23/2021    Assessment & Plan   John Juárez is a 55 year old male with PMHx of schizophrenia, hx of TBI, alcohol use disorder, COPD, hyperlipidemia and hypothyroidism who was admitted on 9/9/2020 for evaluation of aggressive behavior at his group home.  He has been evaluated by Psychiatry and his medical condition remains stable, though his group home is now unwilling to take him back and thus hospitalization has been prolonged awaiting new placement. Under civil commitment through [facility]     Neurocognitive disorder dt hx of TBI and alcohol use disorder  Schizophrenia  Under commitment  Had been residing in group home prior to admission. Brought to hospital for evaluation of aggressive behaviors. Group home now unwilling to take him back. Has had numerous CODE 21s called this stay. Seen by psych, meds adjusted. Is currently under civil commitment and court hold through [facility] until 7/31/21. Earlier on in stay, psych had recommended geripsych placement but per evaluation on 12/18, no longer felt this was needed and recommended to continue current meds. QTc 428 on EKG on 12/18. Seen again by psych on 1/5/21 and again on 2/15, not felt to need inpatient psych stay.   -- On Haldol 10mg TID, memantine 5 mg daily, venlafaxine 75 mg daily, benztropine 0.25 mg BID  -- Haloperidol, olanzapine, and lorazepam PRN for acute agitation   *Had isolated episode of orthostatic hypotension after receiving olanzapine, but no recurrence   -- Door alarm in place due to high risk for elopement  -- SW following for placement  -Nursing notes no acute behavioral issues.     Back cellulitis, Resolved  Noted on 1/24, initiated on cephalexin at that time. Received local wound care and completed 7-day course of antibiotics on 1/30    Baseline Hypotension  BP 90s systolic on  average. Asymptomatic. No concerns     Hyperlipidemia  Chronic and stable on atorvastatin     COPD  Chronic and stable on salmeterol, albuterol prn     Hypothyroidism  Chronic and stable on levothyroxine     Tobacco use disorder  Using nicotine patch and PRN gum     Resting tremor of upper extremities ?TD due to medication  No acute issues   If worsens, will consider psychiatry consult     Asymptomatic COVID19 screening: negative on 2/21/2021     Diet: Room Service  Diet  Combination Diet Regular Diet Adult; Safe Tray - with utensils    DVT Prophylaxis: Pneumatic Compression Devices  Valentin Catheter: not present  Code Status: Full Code         Disposition: Expected discharge pending placement. Awaiting guardianship    Irasema Farooq MD  Hospitalist Service  [facility]  Contact information available via [facility] Paging/Directory    ______________________________________________________________________    Interval History   Limited history from patient, somnolent yet arousable, states he wanted some food.  No pain complaints.  Nursing notes no acute behavioral issues; no ongoing tremor issues    Data reviewed today: I reviewed all medications, new labs and imaging results over the last 24 hours.    Physical Exam   Vital Signs: Temp: 97.5  F (36.4  C) Temp src: Oral BP: 100/68 Pulse: 80   Resp: 16 SpO2: 98 % O2 Device: None (Room air)    Weight: 182 lbs 0 oz     Constitutional: NAD, somnolent on encounter yet arousable, could softly answer question.  Respiratory: Breathing non-labored.  Skin/Integument: No rash  Neuro: Somnolent on morning encounters yet arousable.  Appears comfortable, moves all extremities, no tremor noted.  Psych: Affect calm.    Data   No lab results found in last 7 days.        Medications       aspirin  81 mg Oral Daily     atorvastatin  80 mg Oral At Bedtime     benztropine  0.25 mg Oral BID     docusate sodium  100 mg Oral BID     haloperidol  10 mg Oral TID      levothyroxine  88 mcg Oral Daily     memantine  5 mg Oral Daily     salmeterol  1 puff Inhalation BID     venlafaxine  75 mg Oral Daily with breakfast      [FreeTextEntry5] : As per HPI otherwise negative.  [FreeTextEntry1] : As per HPI HPI otherwise negative.

## 2022-07-06 NOTE — PLAN OF CARE
Problem: Suicide Risk  Goal: Absence of Self-Harm  Intervention: Assess Risk to Self and Maintain Safety  Recent Flowsheet Documentation  Taken 7/6/2022 1120 by Jonelle Delatorre RN  Behavior Management: behavioral plan reviewed  Self-Harm Prevention: environmental self-harm risks assessed     Problem: Mood Impairment (Psychotic Signs/Symptoms)  Goal: Improved Mood Symptoms (Psychotic Signs/Symptoms)  Intervention: Optimize Emotion and Mood  Recent Flowsheet Documentation  Taken 7/6/2022 1120 by Jonelle Delatorre RN  Supportive Measures:    active listening utilized    positive reinforcement provided  Diversional Activity: (refused to come out in milieu) other (see comments)   Goal Outcome Evaluation:    Plan of Care Reviewed With: patient      Patient remains isolative to his room and withdrawn. He is not interacting with peers. He gave minimal answers to questions asked. He was observed by this writer mummatthew while staring at the ceiling. When asked whether he sees somebody, patient did not verbally respond. He walked down to the dining area and ate 75% of his breakfast then went back to his room and laid down. He was encouraged to come out and attend groups but patient refused. He was also encouraged to take a shower but again, consistently refused to take one. Patient denied SI/SIB nor hallucinations. He denied wishing himself to be dead. His gait was unsteady while walking down to the hallway. He is at risk for falls. He sometimes shuffles when he walks so he needs a staff to accompany him when walking down the cruz.

## 2022-07-06 NOTE — PROGRESS NOTES
PSYCHIATRY  PROGRESS NOTE     DATE OF SERVICE   07/05/2022     CHIEF COMPLAINT   Patient was admitted due to inability to safely care for himself and psychosis.       SUBJECTIVE   Nursing reports:   Patient continues to isolate to his room only coming for meals with prompting. He attended one of the unit activities with staff encouragement but only stayed for short time. He appears confused, disorganized and disheveled. He declined encouragement to shower. Patient ate about 50% of his meal and snacks, he is complaint with medications, denies depression, anxiety and SI/SIB.      Patient has been discussed in detail with the  during team meeting.  We continue to have no updates on the guardianship process.     OBJECTIVE   Patient was seen and evaluated at bedside by himself, this was a face-to-face evaluation.  Patient continues to be at his baseline and denies any other symptoms or concerns.  Patient continues to ask when he will be able to leave the hospital but does not understand very well the information provided to him.       MEDICATIONS   Medications:  Scheduled Meds:    aspirin  81 mg Oral Daily     benztropine  0.5 mg Oral BID     busPIRone HCl  30 mg Oral BID     cloZAPine  225 mg Oral At Bedtime     donepezil  10 mg Oral At Bedtime     gabapentin  100 mg Oral TID w/meals     haloperidol  2 mg Oral At Bedtime     levothyroxine  88 mcg Oral Daily     megestrol  20 mg Oral Daily     melatonin  5 mg Oral At Bedtime     memantine  10 mg Oral BID     mirtazapine  15 mg Oral At Bedtime     salmeterol  1 puff Inhalation BID     Continuous Infusions:  PRN Meds:.albuterol, alum & mag hydroxide-simethicone, benzocaine-menthol, hydrOXYzine, nicotine, nystatin, polyethylene glycol, polyethylene glycol-propylene glycol PF, senna-docusate    Medication adherence issues: MS Med Adherence Y/N: Yes, Hospitalization  Medication side effects: MEDICATION SIDE EFFECTS: no side effects reported  Benefit: Yes / No:  "Yes       ROS   A comprehensive review of systems was negative.       MENTAL STATUS EXAM   Vitals: /88 (BP Location: Left arm, Patient Position: Sitting, Cuff Size: Adult Small)   Pulse 104   Temp (!) 96.7  F (35.9  C) (Temporal)   Resp 16   Ht 1.778 m (5' 10\")   Wt 77.1 kg (170 lb)   SpO2 100%   BMI 24.39 kg/m       Same as last visit   Appearance:  No apparent distress  Mood: \"I am okay\"  Affect: Blunted  Suicidal Ideation: Denies  Homicidal Ideation: Denies  Thought process: Disorganized  Thought content: Remains confused and delusional.  Continues to be internally preoccupied and actively hallucinating.  Fund of Knowledge: Below average  Attention/Concentration: Poor  Language ability: Significantly impaired  Memory: Global memory impairment  Insight:  Poor.  Judgement: Poor  Orientation: Only to person  Psychomotor Behavior: slowed    Muscle Strength and Tone: MuscleStrength: Normal and Atrophy  Gait and Station: Not evaluated       LABS   personally reviewed.     No results found for: PHENYTOIN, PHENOBARB, VALPROATE, CBMZ       DIAGNOSIS   Principal Problem:    Major neurocognitive disorder, due to multiple etiologies, with behavioral disturbance, severe (H)    Active Problem List:  Patient Active Problem List   Diagnosis     TBI with aggressive behavior     HTN (hypertension)     COPD (chronic obstructive pulmonary disease) (H)     Dementia with behavioral disturbance (H)     Chronic schizophrenia (H)     Smoker     Agitation     Behavior disturbance     Psychosis (H)     Major neurocognitive disorder, due to multiple etiologies, with behavioral disturbance, severe (H)     Paranoid schizophrenia, chronic condition with acute exacerbation (H)     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 Ongoing education given regarding " diagnostic and treatment options with risks, benefits and alternatives and adequate verbalization of understanding.  2.  Medications       BuSpar 30 mg 2 times daily       Cogentin 0.5 mg 2 times a day       Clozaril 225 mg at bedtime       Aricept 10 mg at bedtime       Gabapentin 100 mg 3 times a day       Haldol 2 mg at bedtime       Namenda 10 mg 2 times a day       Remeron 15 mg at bedtime       melatonin 5 mg at bedtime  3.  Medical team following the patient   4.   coordinating a safe discharge plan with the patient.    CBC today is within normal limits.  Risk Assessment: [facility] RISK ASSESSMENT: Patient able to contract for safety    Coordination of Care:   Treatment Plan reviewed and physician signed, Care discussed with Care/Treatment Team Members, Chart reviewed and Patient seen      Re-Certification I certify that the inpatient psychiatric facility services furnished since the previous certification were, and continue to be, medically necessary for, either, treatment which could reasonably be expected to improve the patient s condition or diagnostic study and that the hospital records indicate that the services furnished were, either, intensive treatment services, admission and related services necessary for diagnostic study, or equivalent services.     I certify that the patient continues to need, on a daily basis, active treatment furnished directly by or requiring the supervision of inpatient psychiatric facility personnel.   I estimate 14 days of hospitalization is necessary for proper treatment of the patient. My plans for post-hospital care for this patient are  TBD     Ginger Dubon MD    -     07/05/2022 -     3:33 PM    Total time 15 minutes with > 50%spent on coordination of cares and psycho-education.    This note was created with help of Dragon dictation system. Grammatical / typing errors are not intentional.    Ginger Dubon MD

## 2022-07-06 NOTE — PROGRESS NOTES
PSYCHIATRY  PROGRESS NOTE     DATE OF SERVICE   07/06/2022       CHIEF COMPLAINT   Patient was admitted due to inability to safely care for himself and psychosis.       SUBJECTIVE   Nursing reports:   Patient remains isolative to his room and withdrawn. He is not interacting with peers. He gave minimal answers to questions asked. He was observed by this writer mumbling while staring at the ceiling. When asked whether he sees somebody, patient did not verbally respond. He walked down to the dining area and ate 75% of his breakfast then went back to his room and laid down. He was encouraged to come out and attend groups but patient refused. He was also encouraged to take a shower but again, consistently refused to take one. Patient denied SI/SIB nor hallucinations. He denied wishing himself to be dead. His gait was unsteady while walking down to the hallway. He is at risk for falls. He sometimes shuffles when he walks so he needs a staff to accompany him when walking down the cruz.     Patient has been discussed in detail with the  during team meeting.  We continue to have no updates on the guardianship process.     OBJECTIVE   Patient was seen and evaluated at bedside by himself, this was a face-to-face evaluation.  Patient stated that he is feeling tired today.  He is denying any other issues.  Said that he does not have any side effects from the medications.  Patient is insisting that he is leaving the hospital today and the date is June 20, 2099.  Despite me trying to correct the patient he became irritable and insisted that this is the date that he will be leaving the hospital at this afternoon.  He denies any other issues or concerns.  Remains delusional at baseline and constantly responding to internal stimuli.       MEDICATIONS   Medications:  Scheduled Meds:    aspirin  81 mg Oral Daily     benztropine  0.5 mg Oral BID     busPIRone HCl  30 mg Oral BID     cloZAPine  225 mg Oral At Bedtime      "donepezil  10 mg Oral At Bedtime     gabapentin  100 mg Oral TID w/meals     haloperidol  2 mg Oral At Bedtime     levothyroxine  88 mcg Oral Daily     megestrol  20 mg Oral Daily     melatonin  5 mg Oral At Bedtime     memantine  10 mg Oral BID     mirtazapine  15 mg Oral At Bedtime     salmeterol  1 puff Inhalation BID     Continuous Infusions:  PRN Meds:.albuterol, alum & mag hydroxide-simethicone, benzocaine-menthol, hydrOXYzine, nicotine, nystatin, polyethylene glycol, polyethylene glycol-propylene glycol PF, senna-docusate    Medication adherence issues: MS Med Adherence Y/N: Yes, Hospitalization  Medication side effects: MEDICATION SIDE EFFECTS: no side effects reported  Benefit: Yes / No: Yes       ROS   A comprehensive review of systems was negative.       MENTAL STATUS EXAM   Vitals: /88 (BP Location: Left arm, Patient Position: Sitting, Cuff Size: Adult Small)   Pulse 104   Temp (!) 96.7  F (35.9  C) (Temporal)   Resp 16   Ht 1.778 m (5' 10\")   Wt 77.1 kg (170 lb)   SpO2 100%   BMI 24.39 kg/m       Appearance:  No apparent distress  Mood: \"I am tired\"  Affect: Blunted  Suicidal Ideation: Denies  Homicidal Ideation: Denies  Thought process: Disorganized  Thought content: Remains confused and delusional.  Continues to be internally preoccupied and actively hallucinating.  Fund of Knowledge: Below average  Attention/Concentration: Poor  Language ability: Significantly impaired  Memory: Global memory impairment  Insight:  Poor.  Judgement: Poor  Orientation: Only to person  Psychomotor Behavior: slowed    Muscle Strength and Tone: MuscleStrength: Normal and Atrophy  Gait and Station: Not evaluated       LABS   personally reviewed.     No results found for: PHENYTOIN, PHENOBARB, VALPROATE, CBMZ       DIAGNOSIS   Principal Problem:    Major neurocognitive disorder, due to multiple etiologies, with behavioral disturbance, severe (H)    Active Problem List:  Patient Active Problem List   Diagnosis "     TBI with aggressive behavior     HTN (hypertension)     COPD (chronic obstructive pulmonary disease) (H)     Dementia with behavioral disturbance (H)     Chronic schizophrenia (H)     Smoker     Agitation     Behavior disturbance     Psychosis (H)     Major neurocognitive disorder, due to multiple etiologies, with behavioral disturbance, severe (H)     Paranoid schizophrenia, chronic condition with acute exacerbation (H)     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 Ongoing education given regarding diagnostic and treatment options with risks, benefits and alternatives and adequate verbalization of understanding.  2.  Medications       BuSpar 30 mg 2 times daily       Cogentin 0.5 mg 2 times a day       Clozaril 225 mg at bedtime       Aricept 10 mg at bedtime       Gabapentin 100 mg 3 times a day       Haldol 2 mg at bedtime       Namenda 10 mg 2 times a day       Remeron 15 mg at bedtime       melatonin 5 mg at bedtime  3.  Medical team following the patient   4.   coordinating a safe discharge plan with the patient.    CBC today is within normal limits.  Risk Assessment: [facility] RISK ASSESSMENT: Patient able to contract for safety    Coordination of Care:   Treatment Plan reviewed and physician signed, Care discussed with Care/Treatment Team Members, Chart reviewed and Patient seen      Re-Certification I certify that the inpatient psychiatric facility services furnished since the previous certification were, and continue to be, medically necessary for, either, treatment which could reasonably be expected to improve the patient s condition or diagnostic study and that the hospital records indicate that the services furnished were, either, intensive treatment services, admission and related services necessary for diagnostic  study, or equivalent services.     I certify that the patient continues to need, on a daily basis, active treatment furnished directly by or requiring the supervision of inpatient psychiatric facility personnel.   I estimate 14 days of hospitalization is necessary for proper treatment of the patient. My plans for post-hospital care for this patient are  TBD     Ginger Dubon MD    -     07/06/2022  -     3:35 PM    Total time 15 minutes with > 50%spent on coordination of cares and psycho-education.    This note was created with help of Dragon dictation system. Grammatical / typing errors are not intentional.    Ginger Dubon MD

## 2022-07-06 NOTE — PLAN OF CARE
Problem: Sleep Disturbance (Psychotic Signs/Symptoms)  Goal: Improved Sleep (Psychotic Signs/Symptoms)  Outcome: Ongoing, Progressing   Goal Outcome Evaluation:        Slept all night for 9.75 hours  Independent in repositioning in bed  No intake this shift

## 2022-07-07 PROCEDURE — 124N000003 HC R&B MH SENIOR/ADOLESCENT

## 2022-07-07 PROCEDURE — 99231 SBSQ HOSP IP/OBS SF/LOW 25: CPT | Performed by: PSYCHIATRY & NEUROLOGY

## 2022-07-07 PROCEDURE — 250N000013 HC RX MED GY IP 250 OP 250 PS 637: Performed by: PSYCHIATRY & NEUROLOGY

## 2022-07-07 RX ADMIN — CLOZAPINE 225 MG: 100 TABLET ORAL at 20:18

## 2022-07-07 RX ADMIN — SALMETEROL XINAFOATE 1 PUFF: 50 POWDER, METERED ORAL; RESPIRATORY (INHALATION) at 20:18

## 2022-07-07 RX ADMIN — BUSPIRONE HYDROCHLORIDE 30 MG: 15 TABLET ORAL at 20:18

## 2022-07-07 RX ADMIN — MEMANTINE 10 MG: 10 TABLET ORAL at 08:50

## 2022-07-07 RX ADMIN — MEMANTINE 10 MG: 10 TABLET ORAL at 20:26

## 2022-07-07 RX ADMIN — SALMETEROL XINAFOATE 1 PUFF: 50 POWDER, METERED ORAL; RESPIRATORY (INHALATION) at 08:50

## 2022-07-07 RX ADMIN — ASPIRIN 81 MG: 81 TABLET, COATED ORAL at 08:50

## 2022-07-07 RX ADMIN — MIRTAZAPINE 15 MG: 15 TABLET, FILM COATED ORAL at 20:18

## 2022-07-07 RX ADMIN — BUSPIRONE HYDROCHLORIDE 30 MG: 15 TABLET ORAL at 08:50

## 2022-07-07 RX ADMIN — LEVOTHYROXINE SODIUM 88 MCG: 0.09 TABLET ORAL at 08:50

## 2022-07-07 RX ADMIN — GABAPENTIN 100 MG: 100 CAPSULE ORAL at 08:50

## 2022-07-07 RX ADMIN — MEGESTROL ACETATE 20 MG: 20 TABLET ORAL at 08:50

## 2022-07-07 RX ADMIN — GABAPENTIN 100 MG: 100 CAPSULE ORAL at 17:15

## 2022-07-07 RX ADMIN — BENZTROPINE MESYLATE 0.5 MG: 0.5 TABLET ORAL at 20:18

## 2022-07-07 RX ADMIN — GABAPENTIN 100 MG: 100 CAPSULE ORAL at 11:59

## 2022-07-07 RX ADMIN — Medication 5 MG: at 20:17

## 2022-07-07 RX ADMIN — BENZTROPINE MESYLATE 0.5 MG: 0.5 TABLET ORAL at 08:50

## 2022-07-07 RX ADMIN — HALOPERIDOL 2 MG: 1 TABLET ORAL at 20:17

## 2022-07-07 RX ADMIN — DONEPEZIL HYDROCHLORIDE 10 MG: 10 TABLET ORAL at 20:18

## 2022-07-07 ASSESSMENT — ACTIVITIES OF DAILY LIVING (ADL)
ADLS_ACUITY_SCORE: 73

## 2022-07-07 NOTE — PLAN OF CARE
Goal Outcome Evaluation:    Plan of Care Reviewed With: patient       Patient looked unkempt. His hair is greasy, long and untidy. He refused to take a shower. And when he was reminded that he told this writer that he may have one later this afternoon, patient did not respond. Patient remains isolative and withdrawn with a flat affect. He denied SI/SIB but nodded his head when asked whether he is having hallucinations. Patient's appetite is fair. He is med compliant.

## 2022-07-07 NOTE — PLAN OF CARE
Problem: Sleep Disturbance  Goal: Adequate Sleep/Rest  Outcome: Ongoing, Progressing   Goal Outcome Evaluation:        Slept uninterrupted for 10.25 hours  No behavioral concerns encountered

## 2022-07-07 NOTE — PLAN OF CARE
Problem: Behavior Regulation Impairment (Psychotic Signs/Symptoms)  Goal: Improved Behavioral Control (Psychotic Signs/Symptoms)  Outcome: Ongoing, Not Progressing   Goal Outcome Evaluation:    Plan of Care Reviewed With: patient     Patient remains isolative and withdrawn. He looked tired this morning. He did not eat his breakfast. He only drank a glass of orange juice. He continously refused to take a shower. Not attending groups. Not socializing peers. He only gives minimal answers to questions asked. He mumbles or whispers his answers. He denies SI/SIB nor hallucinations. He is alert and oriented to self only. He is med compliant.   Patient had witnessed fall at lunch time. Per staff, his buttocks landed first on the floor and his right elbow hit the head (occipital area) of another patient. Patient denied any pain; nausea/vomiting nor dizziness. BP after the fall was 116/81; P-118. He was seen and examined by the NPCydney.

## 2022-07-07 NOTE — PROGRESS NOTES
"PSYCHIATRY  PROGRESS NOTE     DATE OF SERVICE   07/07/2022       CHIEF COMPLAINT   Patient was admitted due to inability to safely care for himself and psychosis.       SUBJECTIVE   Nursing reports:   Patient remains isolative and withdrawn. He looked tired this morning. He did not eat his breakfast. He only drank a glass of orange juice. He continously refused to take a shower. Not attending groups. Not socializing peers. He only gives minimal answers to questions asked. He mumbles or whispers his answers. He denies SI/SIB nor hallucinations. He is alert and oriented to self only. He is med compliant.   Patient had witnessed fall at lunch time. Per staff, his buttocks landed first on the floor and his right elbow hit the head (occipital area) of another patient. Patient denied any pain; nausea/vomiting nor dizziness. BP after the fall was 116/81; P-118. He was seen and examined by the NP, Cydney.       Patient has been discussed in detail with the  during team meeting.  We continue to have no updates on the guardianship process.     OBJECTIVE   Patient was seen and evaluated at bedside by himself, this was a face-to-face evaluation.  Patient is telling me that he is doing \"all right\".  He insists that at this time he is at the [facility], and gets irritable when I tried to correct him.  Other than this the patient remains at his baseline and there is no new concerns.       MEDICATIONS   Medications:  Scheduled Meds:    aspirin  81 mg Oral Daily     benztropine  0.5 mg Oral BID     busPIRone HCl  30 mg Oral BID     cloZAPine  225 mg Oral At Bedtime     donepezil  10 mg Oral At Bedtime     gabapentin  100 mg Oral TID w/meals     haloperidol  2 mg Oral At Bedtime     levothyroxine  88 mcg Oral Daily     megestrol  20 mg Oral Daily     melatonin  5 mg Oral At Bedtime     memantine  10 mg Oral BID     mirtazapine  15 mg Oral At Bedtime     salmeterol  1 puff Inhalation BID     Continuous " "Infusions:  PRN Meds:.albuterol, alum & mag hydroxide-simethicone, benzocaine-menthol, hydrOXYzine, nicotine, nystatin, polyethylene glycol, polyethylene glycol-propylene glycol PF, senna-docusate    Medication adherence issues: MS Med Adherence Y/N: Yes, Hospitalization  Medication side effects: MEDICATION SIDE EFFECTS: no side effects reported  Benefit: Yes / No: Yes       ROS   A comprehensive review of systems was negative.       MENTAL STATUS EXAM   Vitals: /81   Pulse 118   Temp 98.5  F (36.9  C) (Oral)   Resp 16   Ht 1.778 m (5' 10\")   Wt 77.1 kg (170 lb)   SpO2 99%   BMI 24.39 kg/m       Appearance:  No apparent distress  Mood: \"I am alright\"  Affect: Blunted  Suicidal Ideation: Denies  Homicidal Ideation: Denies  Thought process: Disorganized  Thought content: Remains confused and delusional.  Continues to be internally preoccupied and actively hallucinating.  Fund of Knowledge: Below average  Attention/Concentration: Poor  Language ability: Significantly impaired  Memory: Global memory impairment  Insight:  Poor.  Judgement: Poor  Orientation: Only to person  Psychomotor Behavior: slowed    Muscle Strength and Tone: MuscleStrength: Normal and Atrophy  Gait and Station: Not evaluated       LABS   personally reviewed.     No results found for: PHENYTOIN, PHENOBARB, VALPROATE, CBMZ       DIAGNOSIS   Principal Problem:    Major neurocognitive disorder, due to multiple etiologies, with behavioral disturbance, severe (H)    Active Problem List:  Patient Active Problem List   Diagnosis     TBI with aggressive behavior     HTN (hypertension)     COPD (chronic obstructive pulmonary disease) (H)     Dementia with behavioral disturbance (H)     Chronic schizophrenia (H)     Smoker     Agitation     Behavior disturbance     Psychosis (H)     Major neurocognitive disorder, due to multiple etiologies, with behavioral disturbance, severe (H)     Paranoid schizophrenia, chronic condition with acute " exacerbation (H)     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 Ongoing education given regarding diagnostic and treatment options with risks, benefits and alternatives and adequate verbalization of understanding.  2.  Medications       BuSpar 30 mg 2 times daily       Cogentin 0.5 mg 2 times a day       Clozaril 225 mg at bedtime       Aricept 10 mg at bedtime       Gabapentin 100 mg 3 times a day       Haldol 2 mg at bedtime       Namenda 10 mg 2 times a day       Remeron 15 mg at bedtime       melatonin 5 mg at bedtime  3.  Medical team following the patient   4.   coordinating a safe discharge plan with the patient.    CBC today is within normal limits.  Risk Assessment: [facility] RISK ASSESSMENT: Patient able to contract for safety    Coordination of Care:   Treatment Plan reviewed and physician signed, Care discussed with Care/Treatment Team Members, Chart reviewed and Patient seen      Re-Certification I certify that the inpatient psychiatric facility services furnished since the previous certification were, and continue to be, medically necessary for, either, treatment which could reasonably be expected to improve the patient s condition or diagnostic study and that the hospital records indicate that the services furnished were, either, intensive treatment services, admission and related services necessary for diagnostic study, or equivalent services.     I certify that the patient continues to need, on a daily basis, active treatment furnished directly by or requiring the supervision of inpatient psychiatric facility personnel.   I estimate 14 days of hospitalization is necessary for proper treatment of the patient. My plans for post-hospital care for this patient are  TBD     Ginger Dubon MD    -     07/07/2022  -      3:57 PM    Total time 15 minutes with > 50%spent on coordination of cares and psycho-education.    This note was created with help of Dragon dictation system. Grammatical / typing errors are not intentional.    Ginger Dubon MD

## 2022-07-08 LAB
BASOPHILS # BLD AUTO: 0.1 10E3/UL (ref 0–0.2)
BASOPHILS NFR BLD AUTO: 1 %
EOSINOPHIL # BLD AUTO: 1.6 10E3/UL (ref 0–0.7)
EOSINOPHIL NFR BLD AUTO: 24 %
ERYTHROCYTE [DISTWIDTH] IN BLOOD BY AUTOMATED COUNT: 13.6 % (ref 10–15)
HCT VFR BLD AUTO: 42.1 % (ref 40–53)
HGB BLD-MCNC: 14.8 G/DL (ref 13.3–17.7)
IMM GRANULOCYTES # BLD: 0 10E3/UL
IMM GRANULOCYTES NFR BLD: 0 %
LYMPHOCYTES # BLD AUTO: 1.6 10E3/UL (ref 0.8–5.3)
LYMPHOCYTES NFR BLD AUTO: 26 %
MCH RBC QN AUTO: 32 PG (ref 26.5–33)
MCHC RBC AUTO-ENTMCNC: 35.2 G/DL (ref 31.5–36.5)
MCV RBC AUTO: 91 FL (ref 78–100)
MONOCYTES # BLD AUTO: 0.6 10E3/UL (ref 0–1.3)
MONOCYTES NFR BLD AUTO: 10 %
NEUTROPHILS # BLD AUTO: 2.5 10E3/UL (ref 1.6–8.3)
NEUTROPHILS NFR BLD AUTO: 39 %
NRBC # BLD AUTO: 0 10E3/UL
NRBC BLD AUTO-RTO: 0 /100
PLATELET # BLD AUTO: 193 10E3/UL (ref 150–450)
RBC # BLD AUTO: 4.62 10E6/UL (ref 4.4–5.9)
WBC # BLD AUTO: 6.4 10E3/UL (ref 4–11)

## 2022-07-08 PROCEDURE — 36415 COLL VENOUS BLD VENIPUNCTURE: CPT | Performed by: PSYCHIATRY & NEUROLOGY

## 2022-07-08 PROCEDURE — 85025 COMPLETE CBC W/AUTO DIFF WBC: CPT | Performed by: PSYCHIATRY & NEUROLOGY

## 2022-07-08 PROCEDURE — 124N000003 HC R&B MH SENIOR/ADOLESCENT

## 2022-07-08 PROCEDURE — 250N000013 HC RX MED GY IP 250 OP 250 PS 637: Performed by: PSYCHIATRY & NEUROLOGY

## 2022-07-08 PROCEDURE — 99231 SBSQ HOSP IP/OBS SF/LOW 25: CPT | Performed by: PSYCHIATRY & NEUROLOGY

## 2022-07-08 RX ADMIN — BENZTROPINE MESYLATE 0.5 MG: 0.5 TABLET ORAL at 08:22

## 2022-07-08 RX ADMIN — SALMETEROL XINAFOATE 1 PUFF: 50 POWDER, METERED ORAL; RESPIRATORY (INHALATION) at 19:10

## 2022-07-08 RX ADMIN — MEMANTINE 10 MG: 10 TABLET ORAL at 19:10

## 2022-07-08 RX ADMIN — Medication 5 MG: at 19:10

## 2022-07-08 RX ADMIN — DONEPEZIL HYDROCHLORIDE 10 MG: 10 TABLET ORAL at 19:09

## 2022-07-08 RX ADMIN — GABAPENTIN 100 MG: 100 CAPSULE ORAL at 12:42

## 2022-07-08 RX ADMIN — LEVOTHYROXINE SODIUM 88 MCG: 0.09 TABLET ORAL at 08:22

## 2022-07-08 RX ADMIN — BUSPIRONE HYDROCHLORIDE 30 MG: 15 TABLET ORAL at 08:22

## 2022-07-08 RX ADMIN — MIRTAZAPINE 15 MG: 15 TABLET, FILM COATED ORAL at 19:10

## 2022-07-08 RX ADMIN — GABAPENTIN 100 MG: 100 CAPSULE ORAL at 17:37

## 2022-07-08 RX ADMIN — CLOZAPINE 225 MG: 100 TABLET ORAL at 19:10

## 2022-07-08 RX ADMIN — ASPIRIN 81 MG: 81 TABLET, COATED ORAL at 08:22

## 2022-07-08 RX ADMIN — HALOPERIDOL 2 MG: 1 TABLET ORAL at 19:09

## 2022-07-08 RX ADMIN — BENZTROPINE MESYLATE 0.5 MG: 0.5 TABLET ORAL at 19:10

## 2022-07-08 RX ADMIN — MEGESTROL ACETATE 20 MG: 20 TABLET ORAL at 08:22

## 2022-07-08 RX ADMIN — BUSPIRONE HYDROCHLORIDE 30 MG: 15 TABLET ORAL at 19:10

## 2022-07-08 RX ADMIN — GABAPENTIN 100 MG: 100 CAPSULE ORAL at 08:22

## 2022-07-08 RX ADMIN — MEMANTINE 10 MG: 10 TABLET ORAL at 08:22

## 2022-07-08 ASSESSMENT — ACTIVITIES OF DAILY LIVING (ADL)
ADLS_ACUITY_SCORE: 77
ADLS_ACUITY_SCORE: 73
ADLS_ACUITY_SCORE: 77
ADLS_ACUITY_SCORE: 77
ADLS_ACUITY_SCORE: 73
ADLS_ACUITY_SCORE: 77
ADLS_ACUITY_SCORE: 77

## 2022-07-08 NOTE — PLAN OF CARE
Goal Outcome Evaluation:    Plan of Care Reviewed With: patient       Patient was in his room majority of the shift. He only came out for dinner. He ate about 50% of food served. He drank an ample amount of fluids (orange juice and apple juice). Patient is calm with a flat affect. He denied SI/SIB but did not indicate nor say anything about hallucinations. Patient had a fall this morning. No injuries observed r/t the fall. Patient able to ambulate to and from the unge but his gait is a little unsteady. He sometimes shuffles while walking. He needs a staff to accompany him while ambulating.  No neurological symptoms observed r/t to the fall. His BP was 87/58; P- 104; Pt. Is asymptomatic. BP rechecked 100/76; P-97.

## 2022-07-08 NOTE — PLAN OF CARE
"  Problem: Depression  Goal: Improved Mood  Outcome: Met   Goal Outcome Evaluation:    Plan of Care Reviewed With: patient        Nursing Assessment    Psychosis (H) [F29]    Admit Date: 1/26/2022    Length of Stay: 163    Patient evaluation continues. Assessed mood,anxiety,thoughts and behavior. Patient is progressing towards goals. Patient is encouraged to participate in groups and assisted to develop healthy coping skills.  Patient admitted to talking with his sister experiencing auditory  hallucinations. BP 98/74 (BP Location: Right arm)   Pulse 86   Temp 98  F (36.7  C) (Temporal)   Resp 16   Ht 1.778 m (5' 10\")   Wt 77.1 kg (170 lb)   SpO2 98%   BMI 24.39 kg/m      Mood: good    Patient reports depression  None and reports anxiety none    Affect:flat blunted    Sleep: poor naps during the day only slept 1 hour last night    Appetite: good    SI: denies    HI: denies    SIB: denies      Medication Compliance yes    Group participation:none    ADL's: assist of 1 staff    Fall risk interventions: proper foot wear, orthostatic B/p, standby assist    Rinku Score Interventions:none    Discharge planning in process    Refer to daily team meeting notes for individualized plan of care. Nursing will continue to assess.    *Scale is 1-10 and 10 is the worst.                "

## 2022-07-08 NOTE — PROGRESS NOTES
"PSYCHIATRY  PROGRESS NOTE     DATE OF SERVICE   07/08/2022       CHIEF COMPLAINT   Patient was admitted due to inability to safely care for himself and psychosis.       SUBJECTIVE   Nursing reports:   Patient remains the same and no new behaviors have been reported.        Patient has been discussed in detail with the  during team meeting.  We continue to have no updates on the guardianship process.     OBJECTIVE   Patient was seen and evaluated at bedside by himself, this was a face-to-face evaluation.  Patient presents as pleasantly confused and at his baseline. He stated that he is doing fine and denied all psychiatric symptoms. He continues to be delusional at his baseline and at times hallucinating.        MEDICATIONS   Medications:  Scheduled Meds:    aspirin  81 mg Oral Daily     benztropine  0.5 mg Oral BID     busPIRone HCl  30 mg Oral BID     cloZAPine  225 mg Oral At Bedtime     donepezil  10 mg Oral At Bedtime     gabapentin  100 mg Oral TID w/meals     haloperidol  2 mg Oral At Bedtime     levothyroxine  88 mcg Oral Daily     megestrol  20 mg Oral Daily     melatonin  5 mg Oral At Bedtime     memantine  10 mg Oral BID     mirtazapine  15 mg Oral At Bedtime     salmeterol  1 puff Inhalation BID     Continuous Infusions:  PRN Meds:.albuterol, alum & mag hydroxide-simethicone, benzocaine-menthol, hydrOXYzine, nicotine, nystatin, polyethylene glycol, polyethylene glycol-propylene glycol PF, senna-docusate    Medication adherence issues: MS Med Adherence Y/N: Yes, Hospitalization  Medication side effects: MEDICATION SIDE EFFECTS: no side effects reported  Benefit: Yes / No: Yes       ROS   A comprehensive review of systems was negative.       MENTAL STATUS EXAM   Vitals: BP 98/74 (BP Location: Right arm)   Pulse 86   Temp 98  F (36.7  C) (Temporal)   Resp 16   Ht 1.778 m (5' 10\")   Wt 77.1 kg (170 lb)   SpO2 98%   BMI 24.39 kg/m       Same as last visit   Appearance:  No apparent " "distress  Mood: \"I am alright\"  Affect: Blunted  Suicidal Ideation: Denies  Homicidal Ideation: Denies  Thought process: Disorganized  Thought content: Remains confused and delusional.  Continues to be internally preoccupied and actively hallucinating.  Fund of Knowledge: Below average  Attention/Concentration: Poor  Language ability: Significantly impaired  Memory: Global memory impairment  Insight:  Poor.  Judgement: Poor  Orientation: Only to person  Psychomotor Behavior: slowed    Muscle Strength and Tone: MuscleStrength: Normal and Atrophy  Gait and Station: Not evaluated       LABS   personally reviewed.     No results found for: PHENYTOIN, PHENOBARB, VALPROATE, CBMZ       DIAGNOSIS   Principal Problem:    Major neurocognitive disorder, due to multiple etiologies, with behavioral disturbance, severe (H)    Active Problem List:  Patient Active Problem List   Diagnosis     TBI with aggressive behavior     HTN (hypertension)     COPD (chronic obstructive pulmonary disease) (H)     Dementia with behavioral disturbance (H)     Chronic schizophrenia (H)     Smoker     Agitation     Behavior disturbance     Psychosis (H)     Major neurocognitive disorder, due to multiple etiologies, with behavioral disturbance, severe (H)     Paranoid schizophrenia, chronic condition with acute exacerbation (H)     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 Ongoing education given regarding diagnostic and treatment options with risks, benefits and alternatives and adequate verbalization of understanding.  2.  Medications       BuSpar 30 mg 2 times daily       Cogentin 0.5 mg 2 times a day       Clozaril 225 mg at bedtime       Aricept 10 mg at bedtime       Gabapentin 100 mg 3 times a day       Haldol 2 mg at bedtime       Namenda 10 mg 2 times a day       Remeron " 15 mg at bedtime       melatonin 5 mg at bedtime  3.  Medical team following the patient   4.   coordinating a safe discharge plan with the patient.    CBC today is within normal limits.  Risk Assessment: [facility] RISK ASSESSMENT: Patient able to contract for safety    Coordination of Care:   Treatment Plan reviewed and physician signed, Care discussed with Care/Treatment Team Members, Chart reviewed and Patient seen      Re-Certification I certify that the inpatient psychiatric facility services furnished since the previous certification were, and continue to be, medically necessary for, either, treatment which could reasonably be expected to improve the patient s condition or diagnostic study and that the hospital records indicate that the services furnished were, either, intensive treatment services, admission and related services necessary for diagnostic study, or equivalent services.     I certify that the patient continues to need, on a daily basis, active treatment furnished directly by or requiring the supervision of inpatient psychiatric facility personnel.   I estimate 14 days of hospitalization is necessary for proper treatment of the patient. My plans for post-hospital care for this patient are  TBD     Ginger Dubon MD    -     07/08/2022  -     2:55 PM    Total time 15 minutes with > 50%spent on coordination of cares and psycho-education.    This note was created with help of Dragon dictation system. Grammatical / typing errors are not intentional.    Ginger Dubon MD

## 2022-07-08 NOTE — PLAN OF CARE
Problem: Sleep Disturbance (Psychotic Signs/Symptoms)  Goal: Improved Sleep (Psychotic Signs/Symptoms)  Outcome: Ongoing, Progressing   Goal Outcome Evaluation:             Slept all night for 11 hours  Independent in repositioning in bed  No intake this shift  No complaints of pain

## 2022-07-09 PROCEDURE — 250N000013 HC RX MED GY IP 250 OP 250 PS 637: Performed by: PSYCHIATRY & NEUROLOGY

## 2022-07-09 PROCEDURE — 124N000003 HC R&B MH SENIOR/ADOLESCENT

## 2022-07-09 RX ADMIN — BENZTROPINE MESYLATE 0.5 MG: 0.5 TABLET ORAL at 08:24

## 2022-07-09 RX ADMIN — MEMANTINE 10 MG: 10 TABLET ORAL at 21:03

## 2022-07-09 RX ADMIN — LEVOTHYROXINE SODIUM 88 MCG: 0.09 TABLET ORAL at 08:24

## 2022-07-09 RX ADMIN — CLOZAPINE 225 MG: 100 TABLET ORAL at 21:03

## 2022-07-09 RX ADMIN — BUSPIRONE HYDROCHLORIDE 30 MG: 15 TABLET ORAL at 08:24

## 2022-07-09 RX ADMIN — MEMANTINE 10 MG: 10 TABLET ORAL at 08:24

## 2022-07-09 RX ADMIN — SALMETEROL XINAFOATE 1 PUFF: 50 POWDER, METERED ORAL; RESPIRATORY (INHALATION) at 08:25

## 2022-07-09 RX ADMIN — MEGESTROL ACETATE 20 MG: 20 TABLET ORAL at 08:24

## 2022-07-09 RX ADMIN — GABAPENTIN 100 MG: 100 CAPSULE ORAL at 08:24

## 2022-07-09 RX ADMIN — GABAPENTIN 100 MG: 100 CAPSULE ORAL at 18:48

## 2022-07-09 RX ADMIN — BUSPIRONE HYDROCHLORIDE 30 MG: 15 TABLET ORAL at 21:02

## 2022-07-09 RX ADMIN — DONEPEZIL HYDROCHLORIDE 10 MG: 10 TABLET ORAL at 21:03

## 2022-07-09 RX ADMIN — MIRTAZAPINE 15 MG: 15 TABLET, FILM COATED ORAL at 21:02

## 2022-07-09 RX ADMIN — HALOPERIDOL 2 MG: 1 TABLET ORAL at 21:02

## 2022-07-09 RX ADMIN — ASPIRIN 81 MG: 81 TABLET, COATED ORAL at 08:23

## 2022-07-09 RX ADMIN — GABAPENTIN 100 MG: 100 CAPSULE ORAL at 13:10

## 2022-07-09 RX ADMIN — BENZTROPINE MESYLATE 0.5 MG: 0.5 TABLET ORAL at 21:03

## 2022-07-09 RX ADMIN — Medication 5 MG: at 21:02

## 2022-07-09 ASSESSMENT — ACTIVITIES OF DAILY LIVING (ADL)
ADLS_ACUITY_SCORE: 77

## 2022-07-09 NOTE — PLAN OF CARE
Goal Outcome Evaluation:    Plan of Care Reviewed With: patient      Patient's mood is calm with a flat affect. He remains isolative and withdrawn. He came out from his room for dinner and went back after he ate and took his 1800 med. Patient is not socializing with peers. He provides short and even yes or no answers to questions asked. He denied having thoughts of hurting himself neither wishing himself to be dead. He is med compliant. He refused to attend groups. Declined to watch TV with peers. He ate 75 % of his dinner. No neurological symptoms observed r/t a fall yesterday.

## 2022-07-09 NOTE — PLAN OF CARE
Problem: Sleep Disturbance (Psychotic Signs/Symptoms)  Goal: Improved Sleep (Psychotic Signs/Symptoms)  Outcome: Ongoing, Progressing   Goal Outcome Evaluation:                    Slept uninterrupted for 9.5 hours  No behavioral concerns encountered

## 2022-07-10 PROCEDURE — 250N000013 HC RX MED GY IP 250 OP 250 PS 637: Performed by: PSYCHIATRY & NEUROLOGY

## 2022-07-10 PROCEDURE — 124N000003 HC R&B MH SENIOR/ADOLESCENT

## 2022-07-10 RX ADMIN — MEMANTINE 10 MG: 10 TABLET ORAL at 08:18

## 2022-07-10 RX ADMIN — BUSPIRONE HYDROCHLORIDE 30 MG: 15 TABLET ORAL at 08:17

## 2022-07-10 RX ADMIN — DONEPEZIL HYDROCHLORIDE 10 MG: 10 TABLET ORAL at 21:37

## 2022-07-10 RX ADMIN — MEMANTINE 10 MG: 10 TABLET ORAL at 21:37

## 2022-07-10 RX ADMIN — Medication 5 MG: at 21:37

## 2022-07-10 RX ADMIN — LEVOTHYROXINE SODIUM 88 MCG: 0.09 TABLET ORAL at 08:18

## 2022-07-10 RX ADMIN — ASPIRIN 81 MG: 81 TABLET, COATED ORAL at 08:18

## 2022-07-10 RX ADMIN — BUSPIRONE HYDROCHLORIDE 30 MG: 15 TABLET ORAL at 21:37

## 2022-07-10 RX ADMIN — CLOZAPINE 225 MG: 100 TABLET ORAL at 21:36

## 2022-07-10 RX ADMIN — MEGESTROL ACETATE 20 MG: 20 TABLET ORAL at 08:18

## 2022-07-10 RX ADMIN — GABAPENTIN 100 MG: 100 CAPSULE ORAL at 08:18

## 2022-07-10 RX ADMIN — MIRTAZAPINE 15 MG: 15 TABLET, FILM COATED ORAL at 21:37

## 2022-07-10 RX ADMIN — HALOPERIDOL 2 MG: 1 TABLET ORAL at 21:37

## 2022-07-10 RX ADMIN — SALMETEROL XINAFOATE 1 PUFF: 50 POWDER, METERED ORAL; RESPIRATORY (INHALATION) at 21:38

## 2022-07-10 RX ADMIN — GABAPENTIN 100 MG: 100 CAPSULE ORAL at 12:10

## 2022-07-10 RX ADMIN — GABAPENTIN 100 MG: 100 CAPSULE ORAL at 17:53

## 2022-07-10 RX ADMIN — BENZTROPINE MESYLATE 0.5 MG: 0.5 TABLET ORAL at 08:17

## 2022-07-10 RX ADMIN — SALMETEROL XINAFOATE 1 PUFF: 50 POWDER, METERED ORAL; RESPIRATORY (INHALATION) at 08:18

## 2022-07-10 RX ADMIN — BENZTROPINE MESYLATE 0.5 MG: 0.5 TABLET ORAL at 21:37

## 2022-07-10 ASSESSMENT — ACTIVITIES OF DAILY LIVING (ADL)
ADLS_ACUITY_SCORE: 77

## 2022-07-10 NOTE — PLAN OF CARE
"  Problem: Cognitive Impairment (Psychotic Signs/Symptoms)  Goal: Optimal Cognitive Function (Psychotic Signs/Symptoms)  Outcome: Ongoing, Progressing  Flowsheets (Taken 7/10/2022 1846)  Mutually Determined Action Steps (Optimal Cognitive Function): follows step-by-step instructions  Intervention: Support and Promote Cognitive Ability  Recent Flowsheet Documentation  Taken 7/10/2022 1807 by Wen Broderick, RN  Trust Relationship/Rapport:   care explained   choices provided   questions encouraged   thoughts/feelings acknowledged   emotional support provided  Taken 7/10/2022 1150 by Wen Broderick, RN  Trust Relationship/Rapport:   care explained   choices provided   questions encouraged   thoughts/feelings acknowledged   emotional support provided   Goal Outcome Evaluation:    Plan of Care Reviewed With: patient   Nursing Assessment    Psychosis (H) [F29]    Admit Date: 1/26/2022    Length of Stay: 165    Patient evaluation continues. Assessed mood,anxiety,thoughts and behavior. Patient is progressing towards goals. Patient is encouraged to participate in groups and assisted to develop healthy coping skills.  Patient admits to hearing sisters voice and talking to her admits auditory  hallucinations. /81 (BP Location: Right arm)   Pulse 69   Temp 98.2  F (36.8  C) (Oral)   Resp 18   Ht 1.778 m (5' 10\")   Wt 76.6 kg (168 lb 14.4 oz)   SpO2 96%   BMI 24.23 kg/m      Mood:\"ok\"    Patient reports depression none and reports anxiety little    Affect:flat and blunted    Sleep: slept 9 1/2 hours last night    Appetite: good    SI: denies    HI: denies    SIB: denies      Medication Compliance yes    Group participation: bingo only    ADL's: assisted by 1 staff    Fall risk interventions: proper foot wear, orthostatic B/P standby assist    Rinku Score Interventions: none    Discharge planning in progress    Refer to daily team meeting notes for individualized plan of care. Nursing will continue to " assess.    *Scale is 1-10 and 10 is the worst.

## 2022-07-10 NOTE — PLAN OF CARE
"  Problem: Behavior Regulation Impairment (Psychotic Signs/Symptoms)  Goal: Improved Behavioral Control (Psychotic Signs/Symptoms)  Outcome: Ongoing, Progressing   Goal Outcome Evaluation:    Plan of Care Reviewed With: patient           Nursing Assessment    Psychosis (H) [F29]    Admit Date: 1/26/2022    Length of Stay: 165    Patient evaluation continues. Assessed mood,anxiety,thoughts and behavior. Patient is  progressing towards goals. Patient is encouraged to participate in groups and assisted to develop healthy coping skills.  Patient admits to auditory hallucinations. /85 (Patient Position: Sitting, Cuff Size: Adult Small)   Pulse (!) 127   Temp 97.8  F (36.6  C) (Temporal)   Resp 18   Ht 1.778 m (5' 10\")   Wt 76.6 kg (168 lb 14.4 oz)   SpO2 99%   BMI 24.23 kg/m      Mood: just ok    Patient reports depression none and reports anxiety none    Affect:flat blunted    Sleep: good    Appetite: goo    SI: denies    HI: denies    SIB: denies      Medication Compliance yes    Group participation:no    ADL's: assisted by staff    Fall risk interventions: proper foot wear, orthostatic B/P, standby assist    Rinku Score Interventions: none    Discharge planning in process    Refer to daily team meeting notes for individualized plan of care. Nursing will continue to assess.    *Scale is 1-10 and 10 is the worst.             "

## 2022-07-10 NOTE — PLAN OF CARE
"  Problem: Behavior Regulation Impairment (Psychotic Signs/Symptoms)  Goal: Improved Behavioral Control (Psychotic Signs/Symptoms)  Outcome: Ongoing, Progressing   Goal Outcome Evaluation:    Plan of Care Reviewed With: patient        Nursing Assessment    Psychosis (H) [F29]    Admit Date: 1/26/2022    Length of Stay: 164    Patient evaluation continues. Assessed mood,anxiety,thoughts and behavior. Patient is progressing towards goals. Patient is encouraged to participate in groups and assisted to develop healthy coping skills.  Patient responding to  auditory hallucinations. /79 (BP Location: Left arm, Patient Position: Supine, Cuff Size: Adult Regular)   Pulse 103   Temp 97.5  F (36.4  C) (Temporal)   Resp 18   Ht 1.778 m (5' 10\")   Wt 77.1 kg (170 lb)   SpO2 98%   BMI 24.39 kg/m      Mood: ok    Patient reports depression does not respond and reports anxiety some    Affect:flat blunted    Sleep: good naps during the day    Appetite: good    SI: denies    HI: denies    SIB: denies      Medication Compliance yes    Group participation:yes    ADL's: assisted with cares    Fall risk interventions: proper foot wear, orthostatic B/p stand by assist    Rinku Score Interventions:none    Discharge planning in process    Refer to daily team meeting notes for individualized plan of care. Nursing will continue to assess.    *Scale is 1-10 and 10 is the worst.                "

## 2022-07-10 NOTE — PLAN OF CARE
Problem: Psychomotor Impairment (Psychotic Signs/Symptoms)  Goal: Improved Psychomotor Symptoms (Psychotic Signs/Symptoms)  Outcome: Ongoing, Progressing   Goal Outcome Evaluation:             Received sleeping  Independently went to the toilet x 1 tonight, gait slow and unsteady.  Slept for a total of 9.5 hours

## 2022-07-11 PROCEDURE — 124N000003 HC R&B MH SENIOR/ADOLESCENT

## 2022-07-11 PROCEDURE — 99231 SBSQ HOSP IP/OBS SF/LOW 25: CPT | Performed by: PSYCHIATRY & NEUROLOGY

## 2022-07-11 PROCEDURE — 250N000013 HC RX MED GY IP 250 OP 250 PS 637: Performed by: PSYCHIATRY & NEUROLOGY

## 2022-07-11 RX ADMIN — GABAPENTIN 100 MG: 100 CAPSULE ORAL at 17:25

## 2022-07-11 RX ADMIN — MEGESTROL ACETATE 20 MG: 20 TABLET ORAL at 08:11

## 2022-07-11 RX ADMIN — SALMETEROL XINAFOATE 1 PUFF: 50 POWDER, METERED ORAL; RESPIRATORY (INHALATION) at 18:51

## 2022-07-11 RX ADMIN — MIRTAZAPINE 15 MG: 15 TABLET, FILM COATED ORAL at 18:48

## 2022-07-11 RX ADMIN — MEMANTINE 10 MG: 10 TABLET ORAL at 18:50

## 2022-07-11 RX ADMIN — HALOPERIDOL 2 MG: 1 TABLET ORAL at 18:49

## 2022-07-11 RX ADMIN — DONEPEZIL HYDROCHLORIDE 10 MG: 10 TABLET ORAL at 18:50

## 2022-07-11 RX ADMIN — BENZTROPINE MESYLATE 0.5 MG: 0.5 TABLET ORAL at 08:11

## 2022-07-11 RX ADMIN — GABAPENTIN 100 MG: 100 CAPSULE ORAL at 08:11

## 2022-07-11 RX ADMIN — BUSPIRONE HYDROCHLORIDE 30 MG: 15 TABLET ORAL at 18:48

## 2022-07-11 RX ADMIN — BUSPIRONE HYDROCHLORIDE 30 MG: 15 TABLET ORAL at 08:11

## 2022-07-11 RX ADMIN — CLOZAPINE 225 MG: 100 TABLET ORAL at 18:50

## 2022-07-11 RX ADMIN — GABAPENTIN 100 MG: 100 CAPSULE ORAL at 13:17

## 2022-07-11 RX ADMIN — SALMETEROL XINAFOATE 1 PUFF: 50 POWDER, METERED ORAL; RESPIRATORY (INHALATION) at 08:11

## 2022-07-11 RX ADMIN — LEVOTHYROXINE SODIUM 88 MCG: 0.09 TABLET ORAL at 08:11

## 2022-07-11 RX ADMIN — ASPIRIN 81 MG: 81 TABLET, COATED ORAL at 08:11

## 2022-07-11 RX ADMIN — Medication 5 MG: at 18:49

## 2022-07-11 RX ADMIN — BENZTROPINE MESYLATE 0.5 MG: 0.5 TABLET ORAL at 18:49

## 2022-07-11 RX ADMIN — MEMANTINE 10 MG: 10 TABLET ORAL at 08:11

## 2022-07-11 ASSESSMENT — ACTIVITIES OF DAILY LIVING (ADL)
ADLS_ACUITY_SCORE: 77

## 2022-07-11 NOTE — PLAN OF CARE
Problem: Sleep Disturbance (Psychotic Signs/Symptoms)  Goal: Improved Sleep (Psychotic Signs/Symptoms)  Outcome: Ongoing, Progressing   Goal Outcome Evaluation:             Slept well for 9.5 hours  Independent in repositioning in bed  Voided once, gait slow but steady  No intake this shift

## 2022-07-11 NOTE — PLAN OF CARE
07/11/22 1507   Individualization/Patient Specific Goals   Patient Personal Strengths resilient   Patient Vulnerabilities lacks insight into illness   Anxieties, Fears or Concerns Patient is at baseline. He lacks insight into his illness. He believes he has a mansion and wants to discharge to his mansion.   Individualized Care Needs Waiting on the guardianship ruling. Continue plan for stabilization.   Patient-Specific Goals (Include Timeframe) Continue with plan for stabilization. 10-14 days.   Interprofessional Rounds   Summary Patient is at baseline, continues to eat with staff assistance, shower with staff assistance, continues with medications with staff assistance.   Participants nursing;psychiatrist;CTC   Team Discussion   Participants Dr. Nickerson, RN, [facility]   Progress Making progress - patient is at baseline   Anticipated length of stay 10-14 days   Continued Stay Criteria/Rationale Patient lacks capacity, is not able to sign himself into a safe environment, patient is on commitment/pittman and is waiting on  ruling for guardianship.   Medical/Physical Please see H and P.   Plan Medication management, psychiatric evaluations, group therapy, milieu therapy, and CTC case management. Patient is stable, but is not able to safely discharge due to his lack of capacity. Team is waiting on guardianship ruling in order to proceed with discharge plans.   Anticipated Discharge Disposition assisted living     PRECAUTIONS AND SAFETY    Behavioral Orders   Procedures    Code 1 - Restrict to Unit    Code 2     CT scan only    Code 3     Patient can go out of the unit with staff supervision. He needs to be taken out by him self with 2 staff and security present.    Fall precautions    Routine Programming     As clinically indicated    Status 15     Every 15 minutes.       Safety  Safety WDL: WDL  Patient Location: patient room, own  Observed Behavior: calm  Observed Behavior (Comment): napping  Safety Measures:  environmental rounds completed, safety rounds completed, suicide check-in completed  Diversional Activity: television  Suicidality: Status 15  Seizure precautions: calm, consistent lighting  Assault: status 15  Elopement Assessment: Distress about legal situation (holds for mental health or criminal)  Elopement Interventions: status 15  Sexual: status 15

## 2022-07-11 NOTE — PLAN OF CARE
"  Problem: Behavior Regulation Impairment (Psychotic Signs/Symptoms)  Goal: Improved Behavioral Control (Psychotic Signs/Symptoms)  Outcome: Ongoing, Progressing   Goal Outcome Evaluation:    Plan of Care Reviewed With: patient      Nursing Assessment    Psychosis (H) [F29]    Admit Date: 1/26/2022    Length of Stay: 166    Patient evaluation continues. Assessed mood,anxiety,thoughts and behavior. Patient is  progressing towards goals. Patient is encouraged to participate in groups and assisted to develop healthy coping skills.  Patient admits to auditory hallucinations. /80 (BP Location: Right arm, Patient Position: Supine, Cuff Size: Adult Small)   Pulse 72   Temp 97.8  F (36.6  C) (Temporal)   Resp 16   Ht 1.778 m (5' 10\")   Wt 76.6 kg (168 lb 14.4 oz)   SpO2 99%   BMI 24.23 kg/m      Mood:ok    Patient reports depression no and reports anxiety no    Affect:flat blunted    Sleep: good all night and naps during the day    Appetite: good    SI: denies    HI: denies    SIB: denies      Medication Compliance yes    Group participation:no    ADL's: assisted    Fall risk interventions: proper foot wear, orthostatic B/P standby assist    Rinku Score Interventions:none    Discharge planning in process    Refer to daily team meeting notes for individualized plan of care. Nursing will continue to assess.    *Scale is 1-10 and 10 is the worst.                  "

## 2022-07-12 PROCEDURE — 250N000013 HC RX MED GY IP 250 OP 250 PS 637: Performed by: PSYCHIATRY & NEUROLOGY

## 2022-07-12 PROCEDURE — 124N000003 HC R&B MH SENIOR/ADOLESCENT

## 2022-07-12 RX ADMIN — ASPIRIN 81 MG: 81 TABLET, COATED ORAL at 09:05

## 2022-07-12 RX ADMIN — BENZTROPINE MESYLATE 0.5 MG: 0.5 TABLET ORAL at 20:24

## 2022-07-12 RX ADMIN — GABAPENTIN 100 MG: 100 CAPSULE ORAL at 17:30

## 2022-07-12 RX ADMIN — MEGESTROL ACETATE 20 MG: 20 TABLET ORAL at 09:05

## 2022-07-12 RX ADMIN — CLOZAPINE 225 MG: 100 TABLET ORAL at 20:24

## 2022-07-12 RX ADMIN — BUSPIRONE HYDROCHLORIDE 30 MG: 15 TABLET ORAL at 20:24

## 2022-07-12 RX ADMIN — MEMANTINE 10 MG: 10 TABLET ORAL at 09:05

## 2022-07-12 RX ADMIN — SALMETEROL XINAFOATE 1 PUFF: 50 POWDER, METERED ORAL; RESPIRATORY (INHALATION) at 09:06

## 2022-07-12 RX ADMIN — MEMANTINE 10 MG: 10 TABLET ORAL at 20:25

## 2022-07-12 RX ADMIN — DONEPEZIL HYDROCHLORIDE 10 MG: 10 TABLET ORAL at 20:25

## 2022-07-12 RX ADMIN — GABAPENTIN 100 MG: 100 CAPSULE ORAL at 09:05

## 2022-07-12 RX ADMIN — LEVOTHYROXINE SODIUM 88 MCG: 0.09 TABLET ORAL at 09:06

## 2022-07-12 RX ADMIN — Medication 5 MG: at 20:24

## 2022-07-12 RX ADMIN — BUSPIRONE HYDROCHLORIDE 30 MG: 15 TABLET ORAL at 09:05

## 2022-07-12 RX ADMIN — BENZTROPINE MESYLATE 0.5 MG: 0.5 TABLET ORAL at 09:05

## 2022-07-12 RX ADMIN — SALMETEROL XINAFOATE 1 PUFF: 50 POWDER, METERED ORAL; RESPIRATORY (INHALATION) at 20:25

## 2022-07-12 RX ADMIN — GABAPENTIN 100 MG: 100 CAPSULE ORAL at 11:58

## 2022-07-12 RX ADMIN — MIRTAZAPINE 15 MG: 15 TABLET, FILM COATED ORAL at 20:25

## 2022-07-12 ASSESSMENT — ACTIVITIES OF DAILY LIVING (ADL)
ADLS_ACUITY_SCORE: 77

## 2022-07-12 NOTE — PLAN OF CARE
Problem: Decreased Participation and Engagement (Psychotic Signs/Symptoms)  Goal: Increased Participation and Engagement (Psychotic Signs/Symptoms)  Outcome: Ongoing, Not Progressing     Problem: Mood Impairment (Psychotic Signs/Symptoms)  Goal: Improved Mood Symptoms (Psychotic Signs/Symptoms)  Outcome: Ongoing, Not Progressing     Problem: Psychomotor Impairment (Psychotic Signs/Symptoms)  Goal: Improved Psychomotor Symptoms (Psychotic Signs/Symptoms)  Outcome: Ongoing, Not Progressing  Intervention: Manage Psychomotor Movement  Recent Flowsheet Documentation  Taken 7/12/2022 1050 by Praveena Delong RN  Patient Performed Hygiene:    dressed    linen changed     Received in bed resting  Physically untidy and unkept.No pain reported  Compliant with taking all his medications  Ate late for breakfast, consumed 100 % of solid foods and milk; 1/2 of the orange juice and yogurt  Pt almost lost his balance while getting up from the chair, caught by this writer. Assisted by staff back to his room.  Encouraged to shower but declined, allowed change of scrubs and jeanie/anal care with wet wipes.  Appreciative to staff.Affect remained flat and blunted, declined to attend groups  Denies feeling depressed /anxious or suicidal  Ate 50 % of lunch  Overall isolative and withdrawn,

## 2022-07-12 NOTE — PLAN OF CARE
Goal Outcome Evaluation:    Plan of Care Reviewed With: patient        Patient remains isolative and withdrawn. He was observed mumbling as if talking to someone while staring at the ceiling. Patient denied SI/SIB. He is calm and cooperative. He is med compliant. He only provide very limited answers to questions asked. He ate 75% of his dinner. Did not attend groups. He looked untidy with long hair; beard is long as well as his fingernails. He refused to have a shower this morning as well as this afternoon. He is napping most of the shift.

## 2022-07-12 NOTE — PLAN OF CARE
Problem: Sleep Disturbance (Psychotic Signs/Symptoms)  Goal: Improved Sleep (Psychotic Signs/Symptoms)  Outcome: Ongoing, Progressing   Goal Outcome Evaluation:                    Slept for 10.5 hours  Independent in repositioning in bed  No intake this shift, voided once  No complaints made

## 2022-07-12 NOTE — PROGRESS NOTES
PSYCHIATRIC PROGRESS NOTE    Admission Date: 01/26/2022  Date of Service: 07/11/2022    The patient was seen, his chart reviewed, his case discussed with staff.   Reportedly the patient has been isolative and withdrawn spending majority of time in his room laying in bed. He has been medication compliant and displayed no grossly disruptive behavior.  Upon evaluation today he was unable to provide any reasonable self-assessment. He just mumbled something incoherently in response to my questions.    This is a 56 year old white male with a long history of chronic schizophrenia, COPD, cardiomyopathy, endocarditis s/p bioprosthetic valves (2015), CVA, HLD, hypothyroidism, and TBI. He was hospitalized at [facility] for approximately one year and then transferred to station [facility] mental health unit for ECT treatment where he was hospitalized 6/30/21 - 1/13/22 until it was discovered that he was COVID-19 positive. He was subsequently transferred to medicine service until 1/19/22 and then discharged to [facility] COVID-19 unit until medically stable. He was readmitted to geriatric unit, station 3B, on 1/26/22. He is currently under commitment with Yanez being amended to include Clozaril. He is waiting for a guardianship in order to be placed in the nursing home or a group home.    MEDICATIONS, psychotropic  Aricept 10 mg at bedtime  Namenda 10 mg BID  Clozaril 225 mg at bedtime  Cogentin 0.5 mg BID  Haldol 2 mg at bedtime  Melatonin 5 mg at bedtime  Remeron 15 mg at bedtime    LABS: No new results    MENTAL STATUS EXAMINATION  Revealed somewhat disheveled and unkempt 56-year-old white male appearing his stated age. He was alert but oriented to self only. He denied SI or HI but other than that was unable to provide any coherent self-assessment    DIAGNOSTIC IMPRESSION  Schizophrenia Chronic, undifferentiated type   Neurocognitive Disorder secondary to TBI   Tardive Dyskinesia   Alcohol Use Disorder, in controlled environment    Stimulant Use Disorder, in controlled environment     PLAN: Consider discontinuing Haldol and continue the rest of medications without change.    Juarez Nickerson MD

## 2022-07-12 NOTE — PLAN OF CARE
"  Problem: Behavioral Health Plan of Care  Goal: Plan of Care Review  Outcome: Ongoing, Progressing   Goal Outcome Evaluation:             The patient has been withdrawn and isolative and has spent most of the evening in his room. He was out for meal and had good intake, 90% including juice and milk. He was noted to be tachycardic and scheduled gabapentin given and fluids pushed.     The patient denied pain, anxiety and depression and said no SI. He is orientated to self and not orientated to time, said the year is 2099. Also said he is in the [facility]. He has equal strong hand grasp and no behavioral change was observed by staff who have had history of interactions with patient.    The patient is unkempt and this writer encouraged patient to take a shower and he shook his head no. Was compliant with hs medications. He refused pulse recheck, when asked he replied, \"geeze.\" He was asked if he wanted to be left alone and he replied, \"thank you.\"         "

## 2022-07-13 LAB
ALBUMIN SERPL-MCNC: 3.5 G/DL (ref 3.4–5)
ALP SERPL-CCNC: 88 U/L (ref 40–150)
ALT SERPL W P-5'-P-CCNC: 109 U/L (ref 0–70)
ANION GAP SERPL CALCULATED.3IONS-SCNC: 9 MMOL/L (ref 3–14)
AST SERPL W P-5'-P-CCNC: 162 U/L (ref 0–45)
BASE EXCESS BLDV CALC-SCNC: -5.4 MMOL/L (ref -7.7–1.9)
BILIRUB SERPL-MCNC: 0.4 MG/DL (ref 0.2–1.3)
BUN SERPL-MCNC: 17 MG/DL (ref 7–30)
CALCIUM SERPL-MCNC: 8.7 MG/DL (ref 8.5–10.1)
CHLORIDE BLD-SCNC: 113 MMOL/L (ref 94–109)
CO2 SERPL-SCNC: 18 MMOL/L (ref 20–32)
CREAT SERPL-MCNC: 0.72 MG/DL (ref 0.66–1.25)
ERYTHROCYTE [DISTWIDTH] IN BLOOD BY AUTOMATED COUNT: 14 % (ref 10–15)
GFR SERPL CREATININE-BSD FRML MDRD: >90 ML/MIN/1.73M2
GLUCOSE BLD-MCNC: 207 MG/DL (ref 70–99)
GLUCOSE BLDC GLUCOMTR-MCNC: 171 MG/DL (ref 70–99)
HCO3 BLDV-SCNC: 18 MMOL/L (ref 21–28)
HCT VFR BLD AUTO: 43.7 % (ref 40–53)
HGB BLD-MCNC: 15.6 G/DL (ref 13.3–17.7)
HOLD SPECIMEN: NORMAL
INR PPP: 0.99 (ref 0.85–1.15)
LACTATE SERPL-SCNC: 1.8 MMOL/L (ref 0.7–2)
LACTATE SERPL-SCNC: 3.5 MMOL/L (ref 0.7–2)
MCH RBC QN AUTO: 32.8 PG (ref 26.5–33)
MCHC RBC AUTO-ENTMCNC: 35.7 G/DL (ref 31.5–36.5)
MCV RBC AUTO: 92 FL (ref 78–100)
O2/TOTAL GAS SETTING VFR VENT: 99 %
OXYHGB MFR BLDV: 93 % (ref 70–75)
PCO2 BLDV: 29 MM HG (ref 40–50)
PH BLDV: 7.4 [PH] (ref 7.32–7.43)
PLATELET # BLD AUTO: 168 10E3/UL (ref 150–450)
PO2 BLDV: 75 MM HG (ref 25–47)
POTASSIUM BLD-SCNC: 3.8 MMOL/L (ref 3.4–5.3)
PROLACTIN SERPL 3RD IS-MCNC: 35 NG/ML (ref 4–15)
PROT SERPL-MCNC: 7.6 G/DL (ref 6.8–8.8)
RBC # BLD AUTO: 4.76 10E6/UL (ref 4.4–5.9)
SODIUM SERPL-SCNC: 140 MMOL/L (ref 133–144)
WBC # BLD AUTO: 7.4 10E3/UL (ref 4–11)

## 2022-07-13 PROCEDURE — 36415 COLL VENOUS BLD VENIPUNCTURE: CPT | Performed by: NURSE PRACTITIONER

## 2022-07-13 PROCEDURE — 99231 SBSQ HOSP IP/OBS SF/LOW 25: CPT | Performed by: PSYCHIATRY & NEUROLOGY

## 2022-07-13 PROCEDURE — 84146 ASSAY OF PROLACTIN: CPT | Performed by: NURSE PRACTITIONER

## 2022-07-13 PROCEDURE — 83605 ASSAY OF LACTIC ACID: CPT | Performed by: NURSE PRACTITIONER

## 2022-07-13 PROCEDURE — 82805 BLOOD GASES W/O2 SATURATION: CPT | Performed by: NURSE PRACTITIONER

## 2022-07-13 PROCEDURE — 250N000013 HC RX MED GY IP 250 OP 250 PS 637: Performed by: PSYCHIATRY & NEUROLOGY

## 2022-07-13 PROCEDURE — 82374 ASSAY BLOOD CARBON DIOXIDE: CPT | Performed by: NURSE PRACTITIONER

## 2022-07-13 PROCEDURE — 85610 PROTHROMBIN TIME: CPT | Performed by: NURSE PRACTITIONER

## 2022-07-13 PROCEDURE — 258N000003 HC RX IP 258 OP 636: Performed by: NURSE PRACTITIONER

## 2022-07-13 PROCEDURE — 93010 ELECTROCARDIOGRAM REPORT: CPT | Performed by: INTERNAL MEDICINE

## 2022-07-13 PROCEDURE — 87040 BLOOD CULTURE FOR BACTERIA: CPT | Performed by: NURSE PRACTITIONER

## 2022-07-13 PROCEDURE — 93005 ELECTROCARDIOGRAM TRACING: CPT

## 2022-07-13 PROCEDURE — 99233 SBSQ HOSP IP/OBS HIGH 50: CPT | Performed by: NURSE PRACTITIONER

## 2022-07-13 PROCEDURE — 124N000003 HC R&B MH SENIOR/ADOLESCENT

## 2022-07-13 PROCEDURE — 85027 COMPLETE CBC AUTOMATED: CPT | Performed by: NURSE PRACTITIONER

## 2022-07-13 PROCEDURE — 999N000157 HC STATISTIC RCP TIME EA 10 MIN

## 2022-07-13 RX ADMIN — ASPIRIN 81 MG: 81 TABLET, COATED ORAL at 08:29

## 2022-07-13 RX ADMIN — LEVOTHYROXINE SODIUM 88 MCG: 0.09 TABLET ORAL at 08:29

## 2022-07-13 RX ADMIN — SODIUM CHLORIDE, POTASSIUM CHLORIDE, SODIUM LACTATE AND CALCIUM CHLORIDE 1000 ML: 600; 310; 30; 20 INJECTION, SOLUTION INTRAVENOUS at 11:00

## 2022-07-13 RX ADMIN — CLOZAPINE 225 MG: 100 TABLET ORAL at 20:26

## 2022-07-13 RX ADMIN — MEMANTINE 10 MG: 10 TABLET ORAL at 08:28

## 2022-07-13 RX ADMIN — GABAPENTIN 100 MG: 100 CAPSULE ORAL at 08:29

## 2022-07-13 RX ADMIN — BUSPIRONE HYDROCHLORIDE 30 MG: 15 TABLET ORAL at 08:28

## 2022-07-13 RX ADMIN — BUSPIRONE HYDROCHLORIDE 30 MG: 15 TABLET ORAL at 20:26

## 2022-07-13 RX ADMIN — GABAPENTIN 100 MG: 100 CAPSULE ORAL at 17:52

## 2022-07-13 RX ADMIN — SALMETEROL XINAFOATE 1 PUFF: 50 POWDER, METERED ORAL; RESPIRATORY (INHALATION) at 08:28

## 2022-07-13 RX ADMIN — GABAPENTIN 100 MG: 100 CAPSULE ORAL at 14:09

## 2022-07-13 RX ADMIN — BENZTROPINE MESYLATE 0.5 MG: 0.5 TABLET ORAL at 20:26

## 2022-07-13 RX ADMIN — MEMANTINE 10 MG: 10 TABLET ORAL at 20:27

## 2022-07-13 RX ADMIN — DONEPEZIL HYDROCHLORIDE 10 MG: 10 TABLET ORAL at 20:27

## 2022-07-13 RX ADMIN — SALMETEROL XINAFOATE 1 PUFF: 50 POWDER, METERED ORAL; RESPIRATORY (INHALATION) at 20:28

## 2022-07-13 RX ADMIN — BENZTROPINE MESYLATE 0.5 MG: 0.5 TABLET ORAL at 08:29

## 2022-07-13 RX ADMIN — Medication 5 MG: at 20:27

## 2022-07-13 RX ADMIN — MEGESTROL ACETATE 20 MG: 20 TABLET ORAL at 08:28

## 2022-07-13 RX ADMIN — MIRTAZAPINE 15 MG: 15 TABLET, FILM COATED ORAL at 20:27

## 2022-07-13 ASSESSMENT — ACTIVITIES OF DAILY LIVING (ADL)
ADLS_ACUITY_SCORE: 77

## 2022-07-13 NOTE — PROGRESS NOTES
Rapid Response Team Note    Assessment   In assessment a rapid response was called on John Juárez due to episode of unresponsiveness.  Etiology unclear with broad differential including medication side effects (on several antipsychotics, mood-altering medications as treatment for schizophrenia disorder), electrolyte disturbances, cardiac arrhythmia, sub clinical seizure.   He is a 56 year old white male with a long history of chronic schizophrenia, COPD, cardiomyopathy, endocarditis s/p bioprosthetic valves (2015), CVA, HLD, hypothyroidism, tardive dyskinesia and TBI.     Plan   -  EKG  - CMP, CBC, INR, VBG, lactic acid, prolactin  - 1 L IVF, recheck lactic acid  - q 1 HR neuro and vital signs X 4 hours   - UA with reflex to culture, blood cultures   - Low threshold for head CT if episode recurs or further neurologic changes  - Consider EEG if recurrence   -  The Internal Medicine primary team was able to be reached and they are in agreement with the above plan.  -  Disposition: The patient will remain on the current unit. We will continue to monitor this patient closely.  -  Reassessment and plan follow-up will be performed by the rapid response team      Ginny Gallego, CNP  [id number]   [facility] Paging/Directory    Hospital Course   Brief Summary of events leading to rapid response:   Pt was at baseline this am, ate 100% of breakfast in common room, then wanted to return to his room. He was escorted back to his room by attendant due to baseline unsteady gait. He walked past his room and returned to the common area then sat down unassisted. At that time per RN report, his head slumped to his chest and he become unresponsive with his eyes open. He was not tracking or following commands for estimated 10 min time period, he was noted to be drooling. No evidence of seziure like activity noted, (no history of seizure activity noted in chart). A rapid response was called. Vitals signs and blood sugar all WNL. At the time of  my arrival the hospitalist was on site, patient was found to be alert and interactive at that time. He stated he had to go the bathroom and was assisted to his room. Baseline ataxia was noted. He was oriented to self only, which appears to be his baseline per notes. No focal deficits on my exam, CN II - XII  Grossly intact. LS Clear, hr with RRR no murmurs noted. ROS difficult due to cognitive limitation, answers most of my questions with yes/no. Becomes agitation and frustrated with ongoing questions. Does deny fever, chills, sweats, headache, vision changes, shortness of breath, racing heart, chest pain, nausea, vomiting, diarrhea. Unclear etiology of transient altered mental status, differential is broad. May be medication related given he has several psychotropic medications and received his morning meds about 1 hour prior to event, although it appears he has had no recent medication changes. Will check BMP to assess for electrolyte abnormalities, EKG to assess cardiac rhythm. CBC, lactic to assess for infection (of note, he has been afebrile). No recent falls and not on AC other than baby-dose aspirin, no indication for head CT at this time as he has returned to baseline cognitive status. No evidence of tongue biting on exam, no history of seizure. Will follow q 1 hour vitals and q 1 hour neuros for 4 hours and reassess. Per bedside RN, pt had a similar episode about 3 months ago (see note 22) in which symptoms resolved in a similar manner.     Admission Diagnosis:   Psychosis (H) [F29]    Physical Exam   Temp: 98.2  F (36.8  C) Temp  Min: 97.3  F (36.3  C)  Max: 98.4  F (36.9  C)  Resp: 20 Resp  Min: 16  Max: 20  SpO2: 99 % SpO2  Min: 98 %  Max: 99 %  Pulse: 102 Pulse  Min: 87  Max: 121    No data recorded  BP: 107/79 Systolic (24hrs), Av , Min:93 , Max:107   Diastolic (24hrs), Av, Min:66, Max:79     I/Os: No intake/output data recorded.     Exam:   General: chronically ill appearing  Mental  Status: baseline mental status.  General: Alert and oriented x 1. Knows self. Appears older than stated age. NAD. Appears comfortable lying in bed.   HEENT: Head normocephalic, atraumatic. Anicteric sclera. PERRL. EOMI. Oral mucosa moist. Tongue midline. Pharnyx without exudates. Neck supple. No JVD.  Resp: No signs of respiratory distress. Lungs clear to ausculation bilaterally without rales, rhonchi or wheezes. Non- productive cough noted.   Cardiac: RRR. S1 and S2 normal intensity. No M/R/G noted.  MSK: No joint tenderness or edema. Moves all extremities.   Neuro: Impaired cognition as previous. CN II-XII intact. No visual field deficits. Muscle strength symmetric. Ataxic gait.  No focal deficits.   Psych: Arousable to voice,initially cooperative with exam, became agitated and refusing exam after prolonged period.  Derm: Skin warm and dry. No rashes, ecchymosis or evidence of skin breakdown.   Extremities: No cyanosis, clubbing, distal pulses +2, brisk cap refill X 4.       Significant Results and Procedures   Lactic Acid:    Recent Labs   Lab Test 11/07/21  0827 03/06/21  0811 02/09/21  1710 01/02/21  1028   LACT 1.2  --   --   --    LACTS  --  1.4 1.0 1.2     CBC:   Recent Labs   Lab Test 07/08/22  1104 07/01/22  0726 06/24/22  0715   WBC 6.4 8.4 7.0   HGB 14.8 14.7 14.8   HCT 42.1 42.0 41.8    169 172        Sepsis Evaluation   The patient is not known to have an infection. No evidence of sepsis at this time.

## 2022-07-13 NOTE — PROGRESS NOTES
PSYCHIATRIC PROGRESS NOTE    Admission Date: 01/26/2022  Date of Service: 07/13/2022    The patient was seen, his chart reviewed, his case discussed with staff at the team meeting.  Reportedly, the patient had a fainting episode this morning when he became unresponsive shortly after breakfast. A Rapid Response was called and he was seen by Ginny Gallego CNP, the intensivist. He spontaneously regained consciousness without any interventions in 10 minutes. He was put on SIO which was later discontinued as he returned to his baseline.    This is a 56 year old white male with a long history of chronic schizophrenia, COPD, cardiomyopathy, endocarditis s/p bioprosthetic valves (2015), CVA, HLD, hypothyroidism, and TBI. He was hospitalized at [facility] for approximately one year and then transferred to station 3B mental health unit for ECT treatment where he was hospitalized 6/30/21 - 1/13/22 until it was discovered that he was COVID-19 positive. He was subsequently transferred to medicine service until 1/19/22 and then discharged to [facility]'s COVID-19 unit until medically stable. He was readmitted to geriatric unit, station 3B, on 1/26/22. He is currently under commitment with Yanez being amended to include Clozaril. He is waiting for a guardianship in order to be placed in the nursing home or a group home. I saw him for the first time last Monday and stopped his Haldol as I saw no reason to keep him on two neuroleptics.    MEDICATIONS, psychotropic   Aricept 10 mg at bedtime   Namenda 10 mg BID   Clozaril 225 mg at bedtime   Cogentin 0.5 mg BID   Melatonin 5 mg at bedtime   Remeron 15 mg at bedtime     LABS: His CBC today was entirely WNL. His Blood Chemistry was notable for slightly elevated Chloride at 113, increased ALT at 109 and AST at 162. His Blood Gases revealed low venous PCO2 at 29, increased venous PO2 at 75 and high Oxyhemoglobin of 93, his venous Bicarbonate was low at 18. His EKG was unremarkable.    MENTAL  STATUS EXAMINATION   Revealed somewhat disheveled and unkempt 56-year-old white male appearing his stated age. He was alert but oriented to self only. He denied SI or HI, denied hallucinations but other than that was unable to provide any coherent self-assessment.    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 Placement efforts will start after guardianship is established.    Juarez Nickerson MD

## 2022-07-13 NOTE — PLAN OF CARE
Goal Outcome Evaluation:    Plan of Care Reviewed With: patientPlan of Care Reviewed With: patient       The patient was alert to self and said he is in [facility]. He was reminded that he is at [facility]. He is withdrawn and isolative and guarded upon approach and denies hallucinations. He denied pain, anxiety and depression. Said no SI or hallucinations.    The patient initially refused some of the ordered labs. Another Nurse re approached the patient and he was then willing to comply with lab draws. The patient was asking for saline lock to be removed and staff has observed patient rubbing his hand against saline lock multiple times. Provider ordered saline lock be removed.     He is eating in his room and has a good appetite. He is disheveled and did not offer hygeine cares as patient appears to become overstimulated easily and his frustration tolerance is much less compared to yesterday. Was compliant with scheduled medications.    Staff observed patient to be more unsteady on his feet than usually. Dr. Paige notified and order obtained for SIO for fall risk. Provider mentioned having the treatment team to re evaluate SIO status in the morning.      Needs urine sample, hat in toilet.

## 2022-07-13 NOTE — PLAN OF CARE
Problem: Sleep Disturbance (Psychotic Signs/Symptoms)  Goal: Improved Sleep (Psychotic Signs/Symptoms)  Outcome: Ongoing, Progressing   Goal Outcome Evaluation:    Plan of Care Reviewed With: patient      Slept well for 9.25 hours  Independent with toileting  No concerns raised

## 2022-07-13 NOTE — PLAN OF CARE
Problem: Behavioral Health Plan of Care  Goal: Patient-Specific Goal (Individualization)  Flowsheets (Taken 7/13/2022 0956)  Individualized Care Needs: episode of not responding to sternal rub  Patient-Specific Goals (Include Timeframe): pt will remain stable post rapid response  Note: Shift [place]:  Rapid Response called at 0910 for pt for not responding. Precipitating event, pt was being escorted back to his room. He bypassed his room, turned around and wondering in the lounge, almost running into to pillar in the middle of room before being assisted to a chair. Writer observed slumped with head down, eyes open, and drooling. Pt did not respond to sternal rub and verbal stimulation. Pt did glance up after approximately 10 minutes. See Ginny Gallego CNP.     EKG complete  See abn labs- repeat scheduled at 1500  VS and neuro checks Q hour- see Flowsheets  LR 1L at 400 ml/hr-Tolerated IV bolus, saline lock in place on left hand  HOB < 30 degrees     Refused shower  Ate well at breakfast and lunch at 75+ %, PO intake 720 ml, IV bolus 1000 ml. Weight 168 lb 14.4 oz gain of 8 lb 2.8 oz since admit 1.26.22     Recommendation- give senna 2 tabs PO at bedtime for no BM x 3 days   UA needs collection     1536 SIO order discontinued, IV bolus complete.

## 2022-07-14 LAB
ATRIAL RATE - MUSE: 100 BPM
DIASTOLIC BLOOD PRESSURE - MUSE: NORMAL MMHG
INTERPRETATION ECG - MUSE: NORMAL
P AXIS - MUSE: 41 DEGREES
PR INTERVAL - MUSE: 162 MS
QRS DURATION - MUSE: 122 MS
QT - MUSE: 350 MS
QTC - MUSE: 451 MS
R AXIS - MUSE: 48 DEGREES
SYSTOLIC BLOOD PRESSURE - MUSE: NORMAL MMHG
T AXIS - MUSE: 42 DEGREES
VENTRICULAR RATE- MUSE: 100 BPM

## 2022-07-14 PROCEDURE — 124N000003 HC R&B MH SENIOR/ADOLESCENT

## 2022-07-14 PROCEDURE — 250N000013 HC RX MED GY IP 250 OP 250 PS 637: Performed by: PSYCHIATRY & NEUROLOGY

## 2022-07-14 PROCEDURE — 99221 1ST HOSP IP/OBS SF/LOW 40: CPT | Performed by: STUDENT IN AN ORGANIZED HEALTH CARE EDUCATION/TRAINING PROGRAM

## 2022-07-14 RX ADMIN — CLOZAPINE 225 MG: 100 TABLET ORAL at 19:32

## 2022-07-14 RX ADMIN — DONEPEZIL HYDROCHLORIDE 10 MG: 10 TABLET ORAL at 19:32

## 2022-07-14 RX ADMIN — MEGESTROL ACETATE 20 MG: 20 TABLET ORAL at 08:11

## 2022-07-14 RX ADMIN — ASPIRIN 81 MG: 81 TABLET, COATED ORAL at 08:11

## 2022-07-14 RX ADMIN — Medication 5 MG: at 19:33

## 2022-07-14 RX ADMIN — BUSPIRONE HYDROCHLORIDE 30 MG: 15 TABLET ORAL at 19:32

## 2022-07-14 RX ADMIN — MEMANTINE 10 MG: 10 TABLET ORAL at 19:33

## 2022-07-14 RX ADMIN — SALMETEROL XINAFOATE 1 PUFF: 50 POWDER, METERED ORAL; RESPIRATORY (INHALATION) at 19:35

## 2022-07-14 RX ADMIN — GABAPENTIN 100 MG: 100 CAPSULE ORAL at 17:22

## 2022-07-14 RX ADMIN — GABAPENTIN 100 MG: 100 CAPSULE ORAL at 13:07

## 2022-07-14 RX ADMIN — BENZTROPINE MESYLATE 0.5 MG: 0.5 TABLET ORAL at 08:11

## 2022-07-14 RX ADMIN — LEVOTHYROXINE SODIUM 88 MCG: 0.09 TABLET ORAL at 08:11

## 2022-07-14 RX ADMIN — GABAPENTIN 100 MG: 100 CAPSULE ORAL at 08:11

## 2022-07-14 RX ADMIN — MEMANTINE 10 MG: 10 TABLET ORAL at 08:11

## 2022-07-14 RX ADMIN — BENZTROPINE MESYLATE 0.5 MG: 0.5 TABLET ORAL at 19:32

## 2022-07-14 RX ADMIN — SALMETEROL XINAFOATE 1 PUFF: 50 POWDER, METERED ORAL; RESPIRATORY (INHALATION) at 08:11

## 2022-07-14 RX ADMIN — MIRTAZAPINE 15 MG: 15 TABLET, FILM COATED ORAL at 19:32

## 2022-07-14 RX ADMIN — BUSPIRONE HYDROCHLORIDE 30 MG: 15 TABLET ORAL at 08:11

## 2022-07-14 ASSESSMENT — ACTIVITIES OF DAILY LIVING (ADL)
ADLS_ACUITY_SCORE: 77
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
## 2022-07-14 NOTE — PLAN OF CARE
Problem: Sleep Disturbance (Psychotic Signs/Symptoms)  Goal: Improved Sleep (Psychotic Signs/Symptoms)  Outcome: Ongoing, Progressing     Problem: Mobility Impairment  Goal: Optimal Mobility [place] and Safety  Outcome: Ongoing, Not Progressing   Goal Outcome Evaluation:        Received asleep in bed.Pt is on an SIO for self injury risk related to generalized weakness resulting to unsteady gait.  Able to turn to sides in bed.  0515 attempted to get up to go to the toilet, assisted by staff, gait unsteady, staff had to hold him. Pt then decided he did not need to go to the toilet and went back to bed.  Slept for 8.75 hours  Voided at 0600 but missed the cup.  Still for UA/UC

## 2022-07-14 NOTE — PROGRESS NOTES
"Brief Medicine Follow Up Note    Following up regarding unresponsive episode yesterday (please see Rapid Response note for more details).     Today's vital signs, medications, and nursing notes were reviewed. Labs reviewed most recent serum and urine laboratories.     /78   Pulse 110   Temp 97.1  F (36.2  C) (Temporal)   Resp 16   Ht 1.778 m (5' 10\")   Wt 76.6 kg (168 lb 14.4 oz)   SpO2 98%   BMI 24.23 kg/m      A/P:  Neurology consulted given potential concern for seizure activity (Absence Seizure) with his unresponsive episodes, elevated Prolactin and Lactic Acid. Please see Neurology's note from today for more details. They feel this is more characteristic of behavioral vs neurologic process. Appreciate recs.     No further intervention warranted from a Medicine standpoint. Do not hesitate to page or re-consult for medical concerns.    Cain Lawson PA-C  [facility]  Contact information available via [facility] Paging/Directory    "

## 2022-07-14 NOTE — PROGRESS NOTES
Pt lying in bed. Pt expressed feeling tired and needing to rest.     validated feeling, offered encouragement and comforts.    Pt appreciated the support.     to follow up, reassess and offer spiritual support as needed

## 2022-07-14 NOTE — PLAN OF CARE
"  Problem: Mood Impairment (Psychotic Signs/Symptoms)  Goal: Improved Mood Symptoms (Psychotic Signs/Symptoms)  Intervention: Optimize Emotion and Mood  Recent Flowsheet Documentation  Taken 7/14/2022 1150 by Wen Broderick RN  Diversional Activity: television   Goal Outcome Evaluation:    Plan of Care Reviewed With: patient           Nursing Assessment    Psychosis (H) [F29]    Admit Date: 1/26/2022    Length of Stay: 169    Patient evaluation continues. Assessed mood,anxiety,thoughts and behavior. Patient is progressing towards goals. Pt has chronic auditory hallucinations. Pt is cooperative except with showers. Patient is encouraged to participate in groups and assisted to develop healthy coping skills.  Patient admits to  auditory hallucinations. /85   Pulse 113   Temp 97.7  F (36.5  C)   Resp 16   Ht 1.778 m (5' 10\")   Wt 76.6 kg (168 lb 14.4 oz)   SpO2 98%   BMI 24.23 kg/m      Mood: sad    Patient reports depression none and reports anxiety none    Affect:flat blunted    Sleep: 8 hours on nights and napped or rested for 4 hours this shift.    Appetite: good 80%    SI: denies    HI: denies    SIB : denies      Medication Compliance yes    Group participation: only bingo    ADL's: assisted by staff with [facility]    Fall risk interventions: standby assist sio, proper foot wear, orthostatic B/P    Rinku Score Interventions: none    Discharge planning in process    Refer to daily team meeting notes for individualized plan of care. Nursing will continue to assess.    *Scale is 1-10 and 10 is the worst.             "

## 2022-07-15 LAB
BASOPHILS # BLD AUTO: 0.1 10E3/UL (ref 0–0.2)
BASOPHILS NFR BLD AUTO: 1 %
EOSINOPHIL # BLD AUTO: 1.6 10E3/UL (ref 0–0.7)
EOSINOPHIL NFR BLD AUTO: 18 %
ERYTHROCYTE [DISTWIDTH] IN BLOOD BY AUTOMATED COUNT: 14.1 % (ref 10–15)
HCT VFR BLD AUTO: 41.6 % (ref 40–53)
HGB BLD-MCNC: 14.6 G/DL (ref 13.3–17.7)
HOLD SPECIMEN: NORMAL
IMM GRANULOCYTES # BLD: 0 10E3/UL
IMM GRANULOCYTES NFR BLD: 0 %
LYMPHOCYTES # BLD AUTO: 1.2 10E3/UL (ref 0.8–5.3)
LYMPHOCYTES NFR BLD AUTO: 14 %
MCH RBC QN AUTO: 32.4 PG (ref 26.5–33)
MCHC RBC AUTO-ENTMCNC: 35.1 G/DL (ref 31.5–36.5)
MCV RBC AUTO: 92 FL (ref 78–100)
MONOCYTES # BLD AUTO: 0.5 10E3/UL (ref 0–1.3)
MONOCYTES NFR BLD AUTO: 6 %
NEUTROPHILS # BLD AUTO: 5.3 10E3/UL (ref 1.6–8.3)
NEUTROPHILS NFR BLD AUTO: 61 %
NRBC # BLD AUTO: 0 10E3/UL
NRBC BLD AUTO-RTO: 0 /100
PLATELET # BLD AUTO: 157 10E3/UL (ref 150–450)
RBC # BLD AUTO: 4.51 10E6/UL (ref 4.4–5.9)
WBC # BLD AUTO: 8.6 10E3/UL (ref 4–11)

## 2022-07-15 PROCEDURE — 250N000013 HC RX MED GY IP 250 OP 250 PS 637: Performed by: PSYCHIATRY & NEUROLOGY

## 2022-07-15 PROCEDURE — 36415 COLL VENOUS BLD VENIPUNCTURE: CPT | Performed by: PSYCHIATRY & NEUROLOGY

## 2022-07-15 PROCEDURE — 99231 SBSQ HOSP IP/OBS SF/LOW 25: CPT | Performed by: PSYCHIATRY & NEUROLOGY

## 2022-07-15 PROCEDURE — 85025 COMPLETE CBC W/AUTO DIFF WBC: CPT | Performed by: PSYCHIATRY & NEUROLOGY

## 2022-07-15 PROCEDURE — 124N000003 HC R&B MH SENIOR/ADOLESCENT

## 2022-07-15 RX ADMIN — MEMANTINE 10 MG: 10 TABLET ORAL at 19:50

## 2022-07-15 RX ADMIN — SALMETEROL XINAFOATE 1 PUFF: 50 POWDER, METERED ORAL; RESPIRATORY (INHALATION) at 08:51

## 2022-07-15 RX ADMIN — BENZTROPINE MESYLATE 0.5 MG: 0.5 TABLET ORAL at 08:49

## 2022-07-15 RX ADMIN — LEVOTHYROXINE SODIUM 88 MCG: 0.09 TABLET ORAL at 08:49

## 2022-07-15 RX ADMIN — GABAPENTIN 100 MG: 100 CAPSULE ORAL at 17:21

## 2022-07-15 RX ADMIN — SALMETEROL XINAFOATE 1 PUFF: 50 POWDER, METERED ORAL; RESPIRATORY (INHALATION) at 19:54

## 2022-07-15 RX ADMIN — BENZTROPINE MESYLATE 0.5 MG: 0.5 TABLET ORAL at 19:50

## 2022-07-15 RX ADMIN — CLOZAPINE 250 MG: 100 TABLET ORAL at 19:50

## 2022-07-15 RX ADMIN — BUSPIRONE HYDROCHLORIDE 30 MG: 15 TABLET ORAL at 08:48

## 2022-07-15 RX ADMIN — Medication 5 MG: at 19:50

## 2022-07-15 RX ADMIN — GABAPENTIN 100 MG: 100 CAPSULE ORAL at 08:49

## 2022-07-15 RX ADMIN — MIRTAZAPINE 15 MG: 15 TABLET, FILM COATED ORAL at 19:50

## 2022-07-15 RX ADMIN — DONEPEZIL HYDROCHLORIDE 10 MG: 10 TABLET ORAL at 19:50

## 2022-07-15 RX ADMIN — MEGESTROL ACETATE 20 MG: 20 TABLET ORAL at 08:48

## 2022-07-15 RX ADMIN — MEMANTINE 10 MG: 10 TABLET ORAL at 08:49

## 2022-07-15 RX ADMIN — ASPIRIN 81 MG: 81 TABLET, COATED ORAL at 08:49

## 2022-07-15 RX ADMIN — BUSPIRONE HYDROCHLORIDE 30 MG: 15 TABLET ORAL at 19:50

## 2022-07-15 RX ADMIN — GABAPENTIN 100 MG: 100 CAPSULE ORAL at 12:27

## 2022-07-15 ASSESSMENT — ACTIVITIES OF DAILY LIVING (ADL)
ADLS_ACUITY_SCORE: 77

## 2022-07-15 NOTE — PLAN OF CARE
Patient denies suicidal and homicidal ideation and contracts for safety. He continues to be on SIO status due to recent episode of unresponsiveness. He was isolative to room most of this shift, only coming out for supper. He rated anxiety at 1/10 and denied all other psych symptoms. His affect was flat/blunted. Patient was medication compliant. Vital signs were unremarkable and no new issues were noted.    Problem: Behavioral Health Plan of Care  Goal: Adheres to Safety Considerations for Self and Others  Outcome: Ongoing, Progressing     Problem: Behavioral Health Plan of Care  Goal: Absence of New-Onset Illness or Injury  Outcome: Ongoing, Progressing

## 2022-07-15 NOTE — PROGRESS NOTES
Pt appeared to be sleeping comfortably during the night. Total sleep hours recorded 10. Per SIO staff Pt's gait remains somewhat unsteady when he woke up to use the bathroom. Pt does not ask for help when getting out of bed. No other concerns reported or noted. Will continue to monitor as Pt continues fall precautions, and SIO 10 ft due to risk of self injury.

## 2022-07-15 NOTE — PLAN OF CARE
"Care coordination:  - Patient's case is still pending. His guardianship case continues to say, \"Taken under advisement.\" The  has not ruled, yet.  - Writer emailed patient's , Kurtis at [facility].   "

## 2022-07-15 NOTE — PROGRESS NOTES
"  PSYCHIATRIC PROGRESS NOTE    Admission Date: 01/26/2022  Date of Service: 07/15/2022    The patient was seen, his chart reviewed, his case discussed with staff at the Team Meeting.  He is on 1:1 observation because of the fall risk and the recent episode of unresponsiveness. He is committed and Jarvised.  He has been spending most of the time in his room getting out for meals only. He has been medication compliant.  He appears to be more alert and more interactive after Haldol was discontinued but now reports hearing \"voices\" which he previously denied. No grossly disruptive behavior was observed.    This is a 56 year old white male with a long history of chronic schizophrenia, COPD, cardiomyopathy, endocarditis s/p bioprosthetic valves (2015), CVA, HLD, hypothyroidism, and TBI. He was hospitalized at hospitals for approximately one year and then transferred to [facility] mental health unit for ECT treatment where he was hospitalized 6/30/21 - 1/13/22 until it was discovered that he was COVID-19 positive. He was subsequently transferred to medicine service until 1/19/22 and then discharged to [facility]-19 unit until medically stable. He was readmitted to geriatric unit, station 3B, on 1/26/22. He does have a history of aggressive behavior.  He is currently under commitment with Yanez being amended to include Clozaril. He is waiting for a guardianship in order to be placed in the nursing home or a group home. I saw him for the first time last Monday and stopped his Haldol as I saw no reason to keep him on two neuroleptics.    MEDICATIONS, psychotropic   Aricept 10 mg at bedtime   Namenda 10 mg BID   Clozaril 225 mg at bedtime   Cogentin 0.5 mg BID   Melatonin 5 mg at bedtime   Remeron 15 mg at bedtime     LABS: His CBC was notable for elevated Absolute Eosinophils and otherwise was WNL.    MENTAL STATUS EXAMINATION   Revealed somewhat disheveled and unkempt 56-year-old white male appearing his stated age. He was " more alert with better eye contact and more interactive but oriented to self only. He denied being depressed, denied SI or HI. He admitted to having auditory hallucinations but was unable to describe their content. 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 Continue SIO.    Juarez Nickerson MD

## 2022-07-15 NOTE — PLAN OF CARE
"Goal Outcome Evaluation:    Plan of Care Reviewed With: patient        Nursing Assessment    Psychosis (H) [F29]    Admit Date: 1/26/2022    Length of Stay: 170    Patient evaluation continues. Assessed mood,anxiety,thoughts and behavior. Patient is  progressing towards goals. Patient is encouraged to participate in groups and assisted to develop healthy coping skills.  Patient admits to auditory  hallucinations. BP 94/64   Pulse 84   Temp 96.8  F (36  C) (Temporal)   Resp 16   Ht 1.778 m (5' 10\")   Wt 76.6 kg (168 lb 14.4 oz)   SpO2 96%   BMI 24.23 kg/m      Mood: good    Patient reports depression none and reports anxiety none    Affect flat blunted    Sleep: good, naps during the day    Appetite: good    SI: denies    HI: denies    SIB: denies      Medication Compliance yes    Group participation: only bingo    ADL's: assisted, pt will not do own cares, needs assist    Fall risk interventions: proper foot wear, orthostatic B/p stand by assist,sio    Rinku Score Interventions:none    Discharge planning in process    Refer to daily team meeting notes for individualized plan of care. Nursing will continue to assess.    *Scale is 1-10 and 10 is the worst.                "

## 2022-07-15 NOTE — PLAN OF CARE
Problem: Behavioral Health Plan of Care  Goal: Absence of New-Onset Illness or Injury  Outcome: Ongoing, Progressing     Problem: Psychomotor Impairment (Psychotic Signs/Symptoms)  Goal: Improved Psychomotor Symptoms (Psychotic Signs/Symptoms)  Outcome: Ongoing, Progressing     Patient continues to be on SIO status due to recent episodes of unresponsiveness. He spent the majority of time this shift in room but attended bingo. He refused to attend OT group. Patient was mostly quiet and gave minimal answers to assessment questions. No signs of unresponsiveness noted this shift. Will continue to monitor.

## 2022-07-16 PROCEDURE — 250N000013 HC RX MED GY IP 250 OP 250 PS 637: Performed by: PSYCHIATRY & NEUROLOGY

## 2022-07-16 PROCEDURE — 124N000003 HC R&B MH SENIOR/ADOLESCENT

## 2022-07-16 RX ADMIN — BENZTROPINE MESYLATE 0.5 MG: 0.5 TABLET ORAL at 20:03

## 2022-07-16 RX ADMIN — MEGESTROL ACETATE 20 MG: 20 TABLET ORAL at 09:02

## 2022-07-16 RX ADMIN — BENZTROPINE MESYLATE 0.5 MG: 0.5 TABLET ORAL at 09:02

## 2022-07-16 RX ADMIN — BUSPIRONE HYDROCHLORIDE 30 MG: 15 TABLET ORAL at 20:03

## 2022-07-16 RX ADMIN — MEMANTINE 10 MG: 10 TABLET ORAL at 20:03

## 2022-07-16 RX ADMIN — GABAPENTIN 100 MG: 100 CAPSULE ORAL at 09:02

## 2022-07-16 RX ADMIN — SALMETEROL XINAFOATE 1 PUFF: 50 POWDER, METERED ORAL; RESPIRATORY (INHALATION) at 20:03

## 2022-07-16 RX ADMIN — DONEPEZIL HYDROCHLORIDE 10 MG: 10 TABLET ORAL at 20:03

## 2022-07-16 RX ADMIN — LEVOTHYROXINE SODIUM 88 MCG: 0.09 TABLET ORAL at 09:02

## 2022-07-16 RX ADMIN — SALMETEROL XINAFOATE 1 PUFF: 50 POWDER, METERED ORAL; RESPIRATORY (INHALATION) at 09:02

## 2022-07-16 RX ADMIN — ASPIRIN 81 MG: 81 TABLET, COATED ORAL at 09:02

## 2022-07-16 RX ADMIN — MIRTAZAPINE 15 MG: 15 TABLET, FILM COATED ORAL at 20:03

## 2022-07-16 RX ADMIN — GABAPENTIN 100 MG: 100 CAPSULE ORAL at 17:18

## 2022-07-16 RX ADMIN — CLOZAPINE 250 MG: 100 TABLET ORAL at 20:03

## 2022-07-16 RX ADMIN — BUSPIRONE HYDROCHLORIDE 30 MG: 15 TABLET ORAL at 09:02

## 2022-07-16 RX ADMIN — Medication 5 MG: at 20:03

## 2022-07-16 RX ADMIN — GABAPENTIN 100 MG: 100 CAPSULE ORAL at 12:56

## 2022-07-16 RX ADMIN — MEMANTINE 10 MG: 10 TABLET ORAL at 09:02

## 2022-07-16 ASSESSMENT — ACTIVITIES OF DAILY LIVING (ADL)
ADLS_ACUITY_SCORE: 79
ADLS_ACUITY_SCORE: 79
ADLS_ACUITY_SCORE: 77
ADLS_ACUITY_SCORE: 79
ADLS_ACUITY_SCORE: 77
ADLS_ACUITY_SCORE: 79
ADLS_ACUITY_SCORE: 77

## 2022-07-16 NOTE — PLAN OF CARE
Night Shift Summary (7/15/22 into 07/16/22)    Pt appeared to have slept comfortably, breathing quiet and unlabored. Pt woke up 3x during the shift to use the restroom. He was observed unsteady on his feet. Pt had no c/o pain or discomfort during the HS.     Slept for 9.75 hours.     Pt continues on SIO with 10 ft r/t unreponsive episode with increased confusion. On fall precautions. Needs attended to. No further concerns noted as of this time.

## 2022-07-16 NOTE — PLAN OF CARE
Goal Outcome Evaluation:    Plan of Care Reviewed With: patient     Patient has been isolative to his room most of the shift, appears flat and blunted. He is disoriented to place and situation. He came out for meals with encouragement and ate around 50% of his meals. He denies anxiety, depression and SI/SIB.

## 2022-07-16 NOTE — PLAN OF CARE
Problem: Behavioral Health Plan of Care  Goal: Adheres to Safety Considerations for Self and Others  Outcome: Ongoing, Progressing  Intervention: Develop and Maintain Individualized Safety Plan  Recent Flowsheet Documentation  Taken 7/16/2022 1844 by Dann Maldonado, RN  Safety Measures: environmental rounds completed     Problem: Fall Injury Risk  Goal: Absence of Fall and Fall-Related Injury  Outcome: Ongoing, Progressing  Intervention: Identify and Manage Contributors  Recent Flowsheet Documentation  Taken 7/16/2022 1844 by Dann Maldonado, RN  Self-Care Promotion: independence encouraged  Medication Review/Management: medications reviewed  Intervention: Promote Injury-Free Environment  Recent Flowsheet Documentation  Taken 7/16/2022 1844 by Dann Maldonado, RN  Safety Promotion/Fall Prevention:    clutter free environment maintained    nonskid shoes/slippers when out of bed     Plan of Care Reviewed With: patient      Pt calm, cooperative, denies SI/SIB, no anxiety, depression. Slightly agitated earlier during vital signs check, when staff was trying to help him stand up to use the bathroom. Affect flat, intermittent eye contact, voice soft. Withdrawn and isolative.  Pt denies any pain, can out to eat dinner,  appetite good, ate 75% of dinner. Ambulation slow and steady. Pt medication compliant.  BP 86/58 at 7:45 PM, rechecked 90/61 at 7:55 PM. Pt denies dizziness. Rechecked at 9:00 PM. 107/74, HR 74.  On SIO for safety.

## 2022-07-17 PROCEDURE — 124N000003 HC R&B MH SENIOR/ADOLESCENT

## 2022-07-17 PROCEDURE — 250N000013 HC RX MED GY IP 250 OP 250 PS 637: Performed by: PSYCHIATRY & NEUROLOGY

## 2022-07-17 RX ADMIN — GABAPENTIN 100 MG: 100 CAPSULE ORAL at 17:46

## 2022-07-17 RX ADMIN — BENZTROPINE MESYLATE 0.5 MG: 0.5 TABLET ORAL at 20:11

## 2022-07-17 RX ADMIN — GABAPENTIN 100 MG: 100 CAPSULE ORAL at 08:49

## 2022-07-17 RX ADMIN — GABAPENTIN 100 MG: 100 CAPSULE ORAL at 12:10

## 2022-07-17 RX ADMIN — BENZTROPINE MESYLATE 0.5 MG: 0.5 TABLET ORAL at 08:49

## 2022-07-17 RX ADMIN — CLOZAPINE 250 MG: 100 TABLET ORAL at 20:11

## 2022-07-17 RX ADMIN — Medication 5 MG: at 20:11

## 2022-07-17 RX ADMIN — BUSPIRONE HYDROCHLORIDE 30 MG: 15 TABLET ORAL at 08:49

## 2022-07-17 RX ADMIN — LEVOTHYROXINE SODIUM 88 MCG: 0.09 TABLET ORAL at 08:49

## 2022-07-17 RX ADMIN — ASPIRIN 81 MG: 81 TABLET, COATED ORAL at 08:49

## 2022-07-17 RX ADMIN — MEMANTINE 10 MG: 10 TABLET ORAL at 20:11

## 2022-07-17 RX ADMIN — MEMANTINE 10 MG: 10 TABLET ORAL at 08:49

## 2022-07-17 RX ADMIN — MIRTAZAPINE 15 MG: 15 TABLET, FILM COATED ORAL at 20:11

## 2022-07-17 RX ADMIN — SALMETEROL XINAFOATE 1 PUFF: 50 POWDER, METERED ORAL; RESPIRATORY (INHALATION) at 20:12

## 2022-07-17 RX ADMIN — BUSPIRONE HYDROCHLORIDE 30 MG: 15 TABLET ORAL at 20:11

## 2022-07-17 RX ADMIN — DONEPEZIL HYDROCHLORIDE 10 MG: 10 TABLET ORAL at 20:11

## 2022-07-17 RX ADMIN — MEGESTROL ACETATE 20 MG: 20 TABLET ORAL at 08:49

## 2022-07-17 ASSESSMENT — ACTIVITIES OF DAILY LIVING (ADL)
ADLS_ACUITY_SCORE: 79
ADLS_ACUITY_SCORE: 79
ADLS_ACUITY_SCORE: 76
ADLS_ACUITY_SCORE: 79
ADLS_ACUITY_SCORE: 76

## 2022-07-17 NOTE — PLAN OF CARE
Problem: Psychomotor Impairment (Psychotic Signs/Symptoms)  Goal: Improved Psychomotor Symptoms (Psychotic Signs/Symptoms)  Intervention: Manage Psychomotor Movement  Recent Flowsheet Documentation  Taken 7/17/2022 0840 by Izzy Gallego RN  Patient Performed Hygiene: patient refused  Activity (Behavioral Health): activity encouraged   Goal Outcome Evaluation:     Plan of Care Reviewed With: patient            Patient alert and oriented to self only. Denies pain. Unable actively participate in the assessment process due to his chronic neurocognitive impairment.   Patient seems calm, compliant with medications, refused shower and hygiene care.   Remains on SIO - risk for self injury due to increased confusion.

## 2022-07-17 NOTE — PLAN OF CARE
Problem: Behavioral Health Plan of Care  Goal: Adheres to Safety Considerations for Self and Others  Outcome: Ongoing, Progressing  Intervention: Develop and Maintain Individualized Safety Plan  Recent Flowsheet Documentation  Taken 7/17/2022 1842 by Dann Maldonado, RN  Safety Measures: environmental rounds completed     Plan of Care Reviewed With: patient    Pt calm, cooperative, denies SI/SIB. Affect flat, intermittent eye contact. Withdrawn and isolative. Voice soft, at time hard to understand.  Pt denies any pain, ambulatory but unsteady. Pt visible at millieu during dinner, appetite fair, ate 50% of his food. Refused shower. Pt medication compliant.  On SIO for safety.

## 2022-07-17 NOTE — PLAN OF CARE
Goal Outcome Evaluation:        Problem: Sleep Disturbance (Psychotic Signs/Symptoms)  Goal: Improved Sleep (Psychotic Signs/Symptoms)  Outcome: Ongoing, Progressing        Patient continues to be on SIO with 1:1 staffing for safety as ordered. Patient appeared to be asleep for 11.75 hours. Will continue to monitor and update if there are changes.

## 2022-07-18 LAB — BACTERIA BLD CULT: NO GROWTH

## 2022-07-18 PROCEDURE — 250N000013 HC RX MED GY IP 250 OP 250 PS 637: Performed by: PSYCHIATRY & NEUROLOGY

## 2022-07-18 PROCEDURE — 99231 SBSQ HOSP IP/OBS SF/LOW 25: CPT | Performed by: PSYCHIATRY & NEUROLOGY

## 2022-07-18 PROCEDURE — 124N000003 HC R&B MH SENIOR/ADOLESCENT

## 2022-07-18 RX ADMIN — BENZTROPINE MESYLATE 0.5 MG: 0.5 TABLET ORAL at 08:10

## 2022-07-18 RX ADMIN — LEVOTHYROXINE SODIUM 88 MCG: 0.09 TABLET ORAL at 08:10

## 2022-07-18 RX ADMIN — BUSPIRONE HYDROCHLORIDE 30 MG: 15 TABLET ORAL at 08:54

## 2022-07-18 RX ADMIN — Medication 5 MG: at 20:23

## 2022-07-18 RX ADMIN — MIRTAZAPINE 15 MG: 15 TABLET, FILM COATED ORAL at 20:23

## 2022-07-18 RX ADMIN — GABAPENTIN 100 MG: 100 CAPSULE ORAL at 08:10

## 2022-07-18 RX ADMIN — SALMETEROL XINAFOATE 1 PUFF: 50 POWDER, METERED ORAL; RESPIRATORY (INHALATION) at 08:11

## 2022-07-18 RX ADMIN — GABAPENTIN 100 MG: 100 CAPSULE ORAL at 12:22

## 2022-07-18 RX ADMIN — GABAPENTIN 100 MG: 100 CAPSULE ORAL at 17:14

## 2022-07-18 RX ADMIN — ASPIRIN 81 MG: 81 TABLET, COATED ORAL at 08:10

## 2022-07-18 RX ADMIN — BUSPIRONE HYDROCHLORIDE 30 MG: 15 TABLET ORAL at 20:23

## 2022-07-18 RX ADMIN — MEMANTINE 10 MG: 10 TABLET ORAL at 08:10

## 2022-07-18 RX ADMIN — BENZTROPINE MESYLATE 0.5 MG: 0.5 TABLET ORAL at 20:23

## 2022-07-18 RX ADMIN — DONEPEZIL HYDROCHLORIDE 10 MG: 10 TABLET ORAL at 20:23

## 2022-07-18 RX ADMIN — MEMANTINE 10 MG: 10 TABLET ORAL at 20:23

## 2022-07-18 RX ADMIN — CLOZAPINE 250 MG: 100 TABLET ORAL at 20:23

## 2022-07-18 RX ADMIN — SALMETEROL XINAFOATE 1 PUFF: 50 POWDER, METERED ORAL; RESPIRATORY (INHALATION) at 20:24

## 2022-07-18 RX ADMIN — MEGESTROL ACETATE 20 MG: 20 TABLET ORAL at 08:10

## 2022-07-18 ASSESSMENT — ACTIVITIES OF DAILY LIVING (ADL)
ADLS_ACUITY_SCORE: 79
ADLS_ACUITY_SCORE: 76
ADLS_ACUITY_SCORE: 79
ADLS_ACUITY_SCORE: 79
ADLS_ACUITY_SCORE: 76
ADLS_ACUITY_SCORE: 76
ADLS_ACUITY_SCORE: 79
ADLS_ACUITY_SCORE: 76

## 2022-07-18 NOTE — PLAN OF CARE
Care coordination:  - Patient's case was discussed in team Spoke with Oli Hull, (tjpk) [phone].  - The  has not ruled, yet.  - Oli Hull, emailed writer and  Dk, asking for assistance/updates.

## 2022-07-18 NOTE — PLAN OF CARE
Problem: Psychomotor Impairment (Psychotic Signs/Symptoms)  Goal: Improved Psychomotor Symptoms (Psychotic Signs/Symptoms)  Intervention: Manage Psychomotor Movement  Recent Flowsheet Documentation  Taken 7/18/2022 0822 by Izzy Gallego RN  Patient Performed Hygiene:    bath, complete    dressed    incontinence care    linen changed    shampoo    undressed  Activity (Behavioral Health): activity encouraged   Goal Outcome Evaluation:     Plan of Care Reviewed With: patient          Patient remains alert and oriented to self only.   Isolative and withdraw, unable to participate in group activities. Required moderate A1  for jeanie care, dressing, and toileting. Ambulated SBA.   Patient refused shower but was agreeable to a bed bath. During the bed bath patient was found to be incontinent of bowel and bladder.   Appetite is good, about % of his meals.   Remains on SIO - see orders for details.

## 2022-07-18 NOTE — PLAN OF CARE
07/18/22 1504   Individualization/Patient Specific Goals   Patient Personal Strengths resilient   Patient Vulnerabilities lacks insight into illness   Anxieties, Fears or Concerns Patient is not responsive at times. A rapid response code was called last week. Patient is medically stable.   Individualized Care Needs Patient is on a one to one.   Patient-Specific Goals (Include Timeframe) Continue with plan, psychiatrically stable (at baseline)   Interprofessional Rounds   Summary Patient continues to be at psychiatric baseline. Team continues to wait for guardian  to rule.   Participants nursing;psychiatrist;CTC;OT   Team Discussion   Participants Dr. Root, CTC, RN, OT   Progress No new progress   Anticipated length of stay 10-14 days   Continued Stay Criteria/Rationale Patient can not discharge safely. He is in need of cares for ADLs and can not sign himself into a nursing home or assisted living, due to his lack of capacity. Team is waiting for guardianship ruling.   Medical/Physical Please see H and P.   Plan Medication management, psychiatric evaluations, nursing assessments, group therapy, milieu therapy, CTC case management, patient is on SIO after rapid response code.   Anticipated Discharge Disposition assisted living     PRECAUTIONS AND SAFETY    Behavioral Orders   Procedures    Code 1 - Restrict to Unit    Code 2     CT scan only    Code 3     Patient can go out of the unit with staff supervision. He needs to be taken out by him self with 2 staff and security present.    Fall precautions    Routine Programming     As clinically indicated    Status 15     Every 15 minutes.    Status Individual Observation     Patient SIO status reviewed with team/RN.  Please also refer to RN/team documentation for add'l detail.    -SIO staff to monitor following which have contributed to patient being on SIO:  Unresponsive episode with increased confusion  -Possible interventions SIO staff could use to support  patient's treatment progress:  Monitor patient for further unresponsive episodes, SBA with cares and ambulation  -When following observed, team will review discontinuation of SIO:  Improved medical condition     Order Specific Question:   CONTINUOUS 24 hours / day     Answer:   Other     Order Specific Question:   Specify distance     Answer:   10ft     Order Specific Question:   Indications for SIO     Answer:   Self-injury risk    Status Individual Observation     Patient SIO status reviewed with team/RN.  Please also refer to RN/team documentation for add'l detail.    -SIO staff to monitor following which have contributed to patient being on SIO:  Unresponsive episode with increased confusion  -Possible interventions SIO staff could use to support patient's treatment progress:  Monitor patient for further unresponsive episodes, SBA with cares and ambulation  -When following observed, team will review discontinuation of SIO:  Improved medical condition     Order Specific Question:   CONTINUOUS 24 hours / day     Answer:   Other     Order Specific Question:   Specify distance     Answer:   10ft     Order Specific Question:   Indications for SIO     Answer:   Self-injury risk    Status Individual Observation     Patient SIO status reviewed with team/RN.  Please also refer to RN/team documentation for add'l detail.    -SIO staff to monitor following which have contributed to patient being on SIO: unresponsive episode w/increased confusion     -Possible interventions SIO staff could use to support patient's treatment progress: Monitor pt for further unresponsive episodes, SBA with cares and ambulation    -When following observed, team will review discontinuation of SIO: Improved medical condition     Order Specific Question:   CONTINUOUS 24 hours / day     Answer:   Other     Order Specific Question:   Specify distance     Answer:   10 foot     Order Specific Question:   Indications for SIO     Answer:   Self-injury  risk       Safety  Safety WDL: WDL  Patient Location: patient room, own  Observed Behavior: calm  Observed Behavior (Comment): resting  Safety Measures: environmental rounds completed  Diversional Activity: television  Suicidality: SIO (Status Individual Observation)  (NOTE - order will specify distance  Seizure precautions: calm, consistent lighting  Assault: status 15  Elopement Assessment: Distress about legal situation (holds for mental health or criminal)  Elopement Interventions: status 15  Sexual: status 15

## 2022-07-18 NOTE — PLAN OF CARE
Goal Outcome Evaluation:    Patient continues to be on SIO with 1:1 staffing for safety as ordered. Patient appeared to be asleep for 9.75. Patient urinated x 1 this shift. Will continue to monitor and update if there are changes.

## 2022-07-18 NOTE — PROGRESS NOTES
PSYCHIATRY  PROGRESS NOTE     DATE OF SERVICE   07/18/2022       CHIEF COMPLAINT   Patient was admitted due to inability to safely care for himself and psychosis.  Events from last week have been reviewed with the nursing staff.  At the moment patient is on an SIO due to fall risk.     SUBJECTIVE   Nursing reports:   Patient remains alert and oriented to self only.   Isolative and withdraw, unable to participate in group activities. Required moderate A1  for jeanie care, dressing, and toileting. Ambulated SBA.   Patient refused shower but was agreeable to a bed bath. During the bed bath patient was found to be incontinent of bowel and bladder.   Appetite is good, about % of his meals.   Remains on SIO - see orders for details. .     Patient has been discussed in detail with the  during team meeting.   informed me that we still have not received any updates from the guardianship process.     OBJECTIVE   Patient was seen and evaluated in the day area with one-to-one present during the assessment, this was a face-to-face evaluation.  Patient presented as calm and cooperative with the assessment.  Patient had a good hygiene and he was eating lunch.  Patient stated that he is feeling tired (he looks more alert than on prior occasions).  Patient was not making sense and continues to be pleasantly confused.  He did not seem to be responding to internal stimuli but continues to be delusional.    One-to-one staff informed me that the patient continues to be unstable on his feet.     MEDICATIONS   Medications:  Scheduled Meds:    aspirin  81 mg Oral Daily     benztropine  0.5 mg Oral BID     busPIRone HCl  30 mg Oral BID     cloZAPine  250 mg Oral At Bedtime     donepezil  10 mg Oral At Bedtime     gabapentin  100 mg Oral TID w/meals     levothyroxine  88 mcg Oral Daily     megestrol  20 mg Oral Daily     melatonin  5 mg Oral At Bedtime     memantine  10 mg Oral BID     mirtazapine  15 mg Oral At  "Bedtime     salmeterol  1 puff Inhalation BID     Continuous Infusions:  PRN Meds:.albuterol, alum & mag hydroxide-simethicone, benzocaine-menthol, hydrOXYzine, nicotine, nystatin, polyethylene glycol, polyethylene glycol-propylene glycol PF, senna-docusate    Medication adherence issues: MS Med Adherence Y/N: Yes, Hospitalization  Medication side effects: MEDICATION SIDE EFFECTS: no side effects reported  Benefit: Yes / No: Yes       ROS   A comprehensive review of systems was negative.       MENTAL STATUS EXAM   Vitals: /84   Pulse 114   Temp 97.3  F (36.3  C) (Temporal)   Resp 20   Ht 1.778 m (5' 10\")   Wt 76.6 kg (168 lb 14.4 oz)   SpO2 99%   BMI 24.23 kg/m       Appearance:  No apparent distress  Mood: \"I am tired\"  Affect: Calm  Suicidal Ideation: Denies  Homicidal Ideation: Denies  Thought process: Disorganized  Thought content: Remains confused and delusional.    Fund of Knowledge: Below average  Attention/Concentration: Poor  Language ability: Significantly impaired  Memory: Global memory impairment  Insight:  Poor.  Judgement: Poor  Orientation: Only to person  Psychomotor Behavior: slowed    Muscle Strength and Tone: MuscleStrength: Normal and Atrophy  Gait and Station: Not evaluated       LABS   personally reviewed.     No results found for: PHENYTOIN, PHENOBARB, VALPROATE, CBMZ       DIAGNOSIS   Principal Problem:    Major neurocognitive disorder, due to multiple etiologies, with behavioral disturbance, severe (H)    Active Problem List:  Patient Active Problem List   Diagnosis     TBI with aggressive behavior     HTN (hypertension)     COPD (chronic obstructive pulmonary disease) (H)     Dementia with behavioral disturbance (H)     Chronic schizophrenia (H)     Smoker     Agitation     Behavior disturbance     Psychosis (H)     Major neurocognitive disorder, due to multiple etiologies, with behavioral disturbance, severe (H)     Paranoid schizophrenia, chronic condition with acute " exacerbation (H)     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 Ongoing education given regarding diagnostic and treatment options with risks, benefits and alternatives and adequate verbalization of understanding.  2.  Medications       BuSpar 30 mg 2 times daily       Cogentin 0.5 mg 2 times a day       Clozaril 250 mg at bedtime       Aricept 10 mg at bedtime       Gabapentin 100 mg 3 times a day       Namenda 10 mg 2 times a day       Remeron 15 mg at bedtime       melatonin 5 mg at bedtime  3.  Medical team following the patient   4.   coordinating a safe discharge plan with the patient.    CBC today is within normal limits.  Risk Assessment: [facility] RISK ASSESSMENT: Patient able to contract for safety    Coordination of Care:   Treatment Plan reviewed and physician signed, Care discussed with Care/Treatment Team Members, Chart reviewed and Patient seen      Re-Certification I certify that the inpatient psychiatric facility services furnished since the previous certification were, and continue to be, medically necessary for, either, treatment which could reasonably be expected to improve the patient s condition or diagnostic study and that the hospital records indicate that the services furnished were, either, intensive treatment services, admission and related services necessary for diagnostic study, or equivalent services.     I certify that the patient continues to need, on a daily basis, active treatment furnished directly by or requiring the supervision of inpatient psychiatric facility personnel.   I estimate 14 days of hospitalization is necessary for proper treatment of the patient. My plans for post-hospital care for this patient are  TBD     Ginger Dubon MD    -     07/18/2022  -     3:48 PM    Total time 15  minutes with > 50%spent on coordination of cares and psycho-education.    This note was created with help of Dragon dictation system. Grammatical / typing errors are not intentional.    Ginger Dubon MD

## 2022-07-19 PROCEDURE — 250N000013 HC RX MED GY IP 250 OP 250 PS 637: Performed by: PSYCHIATRY & NEUROLOGY

## 2022-07-19 PROCEDURE — 124N000003 HC R&B MH SENIOR/ADOLESCENT

## 2022-07-19 PROCEDURE — 99231 SBSQ HOSP IP/OBS SF/LOW 25: CPT | Performed by: PSYCHIATRY & NEUROLOGY

## 2022-07-19 RX ADMIN — GABAPENTIN 100 MG: 100 CAPSULE ORAL at 12:36

## 2022-07-19 RX ADMIN — MEMANTINE 10 MG: 10 TABLET ORAL at 08:32

## 2022-07-19 RX ADMIN — MEMANTINE 10 MG: 10 TABLET ORAL at 19:45

## 2022-07-19 RX ADMIN — BENZTROPINE MESYLATE 0.5 MG: 0.5 TABLET ORAL at 19:45

## 2022-07-19 RX ADMIN — ASPIRIN 81 MG: 81 TABLET, COATED ORAL at 08:32

## 2022-07-19 RX ADMIN — LEVOTHYROXINE SODIUM 88 MCG: 0.09 TABLET ORAL at 08:32

## 2022-07-19 RX ADMIN — SALMETEROL XINAFOATE 1 PUFF: 50 POWDER, METERED ORAL; RESPIRATORY (INHALATION) at 08:32

## 2022-07-19 RX ADMIN — SALMETEROL XINAFOATE 1 PUFF: 50 POWDER, METERED ORAL; RESPIRATORY (INHALATION) at 19:56

## 2022-07-19 RX ADMIN — BUSPIRONE HYDROCHLORIDE 30 MG: 15 TABLET ORAL at 19:44

## 2022-07-19 RX ADMIN — Medication 5 MG: at 19:45

## 2022-07-19 RX ADMIN — BUSPIRONE HYDROCHLORIDE 30 MG: 15 TABLET ORAL at 08:32

## 2022-07-19 RX ADMIN — GABAPENTIN 100 MG: 100 CAPSULE ORAL at 08:32

## 2022-07-19 RX ADMIN — MEGESTROL ACETATE 20 MG: 20 TABLET ORAL at 08:32

## 2022-07-19 RX ADMIN — BENZTROPINE MESYLATE 0.5 MG: 0.5 TABLET ORAL at 08:32

## 2022-07-19 RX ADMIN — CLOZAPINE 250 MG: 100 TABLET ORAL at 19:56

## 2022-07-19 RX ADMIN — MIRTAZAPINE 15 MG: 15 TABLET, FILM COATED ORAL at 19:45

## 2022-07-19 RX ADMIN — DONEPEZIL HYDROCHLORIDE 10 MG: 10 TABLET ORAL at 19:45

## 2022-07-19 RX ADMIN — GABAPENTIN 100 MG: 100 CAPSULE ORAL at 17:18

## 2022-07-19 ASSESSMENT — ACTIVITIES OF DAILY LIVING (ADL)
ADLS_ACUITY_SCORE: 79

## 2022-07-19 NOTE — PLAN OF CARE
Problem: Psychomotor Impairment (Psychotic Signs/Symptoms)  Goal: Improved Psychomotor Symptoms (Psychotic Signs/Symptoms)  Outcome: Ongoing, Progressing  Flowsheets (Taken 7/18/2022 1913)  Mutually Determined Action Steps (Improved Psychomotor Symptoms):   adheres to medication regimen   exhibits decrease in agitation   Goal Outcome Evaluation:     Plan of Care Reviewed With: patient       No changes in patient condition observed this shift.   Remained isolative and withdrawn. Unable actively participate in the assessment due to his cognitive impairment.   SIO continues.

## 2022-07-19 NOTE — PLAN OF CARE
Problem: Behavioral Health Plan of Care  Goal: Adheres to Safety Considerations for Self and Others  Intervention: Develop and Maintain Individualized Safety Plan  Recent Flowsheet Documentation  Taken 7/19/2022 0809 by Izzy Gallego RN  Safety Measures: environmental rounds completed   Goal Outcome Evaluation:     Plan of Care Reviewed With: patient         Patient's condition remains on baseline.   SIO was discontinued.   Ordered to offer assistance Q2 hours and walk SBA with patient to/from dinning room at meal time and as needed.

## 2022-07-19 NOTE — PROGRESS NOTES
PSYCHIATRY  PROGRESS NOTE     DATE OF SERVICE   07/19/2022       CHIEF COMPLAINT   Patient was admitted due to inability to safely care for himself and psychosis.       SUBJECTIVE   Nursing reports:   Patient's condition remains on baseline.   SIO was discontinued.   Ordered to offer assistance Q2 hours and walk SBA with patient to/from dinning room at meal time and as needed.     Patient has been discussed in detail with the  during team meeting.   informed me that we still have not received any updates from the guardianship process.     OBJECTIVE   Patient was seen and evaluated at bedside by himself, this was a face-to-face evaluation.  Patient presented as calm and pleasantly confused.  Patient stated that he is feeling tired but denies any other issues.  He continues to be delusional thinking that he is at [facility] and that he has a mansion here in [place].  Patient continues to be at his baseline and there has been no new behaviors.       MEDICATIONS   Medications:  Scheduled Meds:    aspirin  81 mg Oral Daily     benztropine  0.5 mg Oral BID     busPIRone HCl  30 mg Oral BID     cloZAPine  250 mg Oral At Bedtime     donepezil  10 mg Oral At Bedtime     gabapentin  100 mg Oral TID w/meals     levothyroxine  88 mcg Oral Daily     megestrol  20 mg Oral Daily     melatonin  5 mg Oral At Bedtime     memantine  10 mg Oral BID     mirtazapine  15 mg Oral At Bedtime     salmeterol  1 puff Inhalation BID     Continuous Infusions:  PRN Meds:.albuterol, alum & mag hydroxide-simethicone, benzocaine-menthol, hydrOXYzine, nicotine, nystatin, polyethylene glycol, polyethylene glycol-propylene glycol PF, senna-docusate    Medication adherence issues: MS Med Adherence Y/N: Yes, Hospitalization  Medication side effects: MEDICATION SIDE EFFECTS: no side effects reported  Benefit: Yes / No: Yes       ROS   A comprehensive review of systems was negative.       MENTAL STATUS EXAM   Vitals: /84 (BP  "Location: Right arm)   Pulse 116   Temp 97.8  F (36.6  C) (Temporal)   Resp 16   Ht 1.778 m (5' 10\")   Wt 77.7 kg (171 lb 3.2 oz)   SpO2 100%   BMI 24.56 kg/m       Same as last visit  Appearance:  No apparent distress  Mood: \"I am tired\"  Affect: Calm  Suicidal Ideation: Denies  Homicidal Ideation: Denies  Thought process: Disorganized  Thought content: Remains confused and delusional.    Fund of Knowledge: Below average  Attention/Concentration: Poor  Language ability: Significantly impaired  Memory: Global memory impairment  Insight:  Poor.  Judgement: Poor  Orientation: Only to person  Psychomotor Behavior: slowed    Muscle Strength and Tone: MuscleStrength: Normal and Atrophy  Gait and Station: Not evaluated       LABS   personally reviewed.     No results found for: PHENYTOIN, PHENOBARB, VALPROATE, CBMZ       DIAGNOSIS   Principal Problem:    Major neurocognitive disorder, due to multiple etiologies, with behavioral disturbance, severe (H)    Active Problem List:  Patient Active Problem List   Diagnosis     TBI with aggressive behavior     HTN (hypertension)     COPD (chronic obstructive pulmonary disease) (H)     Dementia with behavioral disturbance (H)     Chronic schizophrenia (H)     Smoker     Agitation     Behavior disturbance     Psychosis (H)     Major neurocognitive disorder, due to multiple etiologies, with behavioral disturbance, severe (H)     Paranoid schizophrenia, chronic condition with acute exacerbation (H)     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 Ongoing education given regarding diagnostic and treatment options with risks, benefits and alternatives and adequate verbalization of understanding.  2.  Medications       BuSpar 30 mg 2 times daily       Cogentin 0.5 mg 2 times a day       Clozaril 250 mg at " bedtime       Aricept 10 mg at bedtime       Gabapentin 100 mg 3 times a day       Namenda 10 mg 2 times a day       Remeron 15 mg at bedtime       melatonin 5 mg at bedtime  3.  Medical team following the patient   4.   coordinating a safe discharge plan with the patient.    CBC today is within normal limits.  Risk Assessment: [facility] RISK ASSESSMENT: Patient able to contract for safety    Coordination of Care:   Treatment Plan reviewed and physician signed, Care discussed with Care/Treatment Team Members, Chart reviewed and Patient seen      Re-Certification I certify that the inpatient psychiatric facility services furnished since the previous certification were, and continue to be, medically necessary for, either, treatment which could reasonably be expected to improve the patient s condition or diagnostic study and that the hospital records indicate that the services furnished were, either, intensive treatment services, admission and related services necessary for diagnostic study, or equivalent services.     I certify that the patient continues to need, on a daily basis, active treatment furnished directly by or requiring the supervision of inpatient psychiatric facility personnel.   I estimate 14 days of hospitalization is necessary for proper treatment of the patient. My plans for post-hospital care for this patient are  TBD     Ginger Dubon MD    -     07/19/2022  -     3:49 PM    Total time 15 minutes with > 50%spent on coordination of cares and psycho-education.    This note was created with help of Dragon dictation system. Grammatical / typing errors are not intentional.    Ginger Dubon MD

## 2022-07-19 NOTE — PLAN OF CARE
Problem: Sleep Disturbance  Goal: Adequate Sleep/Rest  Outcome: Ongoing, Progressing   Goal Outcome Evaluation:  Pt slept well the whole night for 8 hours. No concerns overnight. Pt on SIO for safety and self injury risk.

## 2022-07-19 NOTE — PLAN OF CARE
Problem: Depression  Goal: Improved Mood  7/19/2022 1853 by Izzy Gallego RN  Outcome: Ongoing, Progressing   Goal Outcome Evaluation:     Plan of Care Reviewed With: patient       No changes in patient's condition since this morning. He remained isolative and withdrawn. Spent majority of the shift resting in bed. Observed talking to himself. He remained very confused and disoriented. Patient requires prompts and encouragements for all activities out of bed. SBA with ambulation - unsteady gait. Incontinent of small amounts of urine.

## 2022-07-19 NOTE — PLAN OF CARE
Care coordination:  - Writer called Elizabeth prasad to update them that the team continues to wait to hear on the final court ruling on the guardianship case.

## 2022-07-20 LAB — SARS-COV-2 RNA RESP QL NAA+PROBE: NEGATIVE

## 2022-07-20 PROCEDURE — 124N000003 HC R&B MH SENIOR/ADOLESCENT

## 2022-07-20 PROCEDURE — 250N000013 HC RX MED GY IP 250 OP 250 PS 637: Performed by: PSYCHIATRY & NEUROLOGY

## 2022-07-20 PROCEDURE — 87635 SARS-COV-2 COVID-19 AMP PRB: CPT | Performed by: PSYCHIATRY & NEUROLOGY

## 2022-07-20 PROCEDURE — 99231 SBSQ HOSP IP/OBS SF/LOW 25: CPT | Performed by: PSYCHIATRY & NEUROLOGY

## 2022-07-20 RX ADMIN — CLOZAPINE 250 MG: 100 TABLET ORAL at 20:05

## 2022-07-20 RX ADMIN — BENZTROPINE MESYLATE 0.5 MG: 0.5 TABLET ORAL at 08:58

## 2022-07-20 RX ADMIN — GABAPENTIN 100 MG: 100 CAPSULE ORAL at 17:21

## 2022-07-20 RX ADMIN — LEVOTHYROXINE SODIUM 88 MCG: 0.09 TABLET ORAL at 08:58

## 2022-07-20 RX ADMIN — MEMANTINE 10 MG: 10 TABLET ORAL at 08:58

## 2022-07-20 RX ADMIN — SALMETEROL XINAFOATE 1 PUFF: 50 POWDER, METERED ORAL; RESPIRATORY (INHALATION) at 20:05

## 2022-07-20 RX ADMIN — ASPIRIN 81 MG: 81 TABLET, COATED ORAL at 08:58

## 2022-07-20 RX ADMIN — GABAPENTIN 100 MG: 100 CAPSULE ORAL at 08:58

## 2022-07-20 RX ADMIN — MEGESTROL ACETATE 20 MG: 20 TABLET ORAL at 08:58

## 2022-07-20 RX ADMIN — BUSPIRONE HYDROCHLORIDE 30 MG: 15 TABLET ORAL at 08:58

## 2022-07-20 RX ADMIN — BUSPIRONE HYDROCHLORIDE 30 MG: 15 TABLET ORAL at 20:05

## 2022-07-20 RX ADMIN — MIRTAZAPINE 15 MG: 15 TABLET, FILM COATED ORAL at 20:05

## 2022-07-20 RX ADMIN — MEMANTINE 10 MG: 10 TABLET ORAL at 20:05

## 2022-07-20 RX ADMIN — DONEPEZIL HYDROCHLORIDE 10 MG: 10 TABLET ORAL at 20:05

## 2022-07-20 RX ADMIN — SALMETEROL XINAFOATE 1 PUFF: 50 POWDER, METERED ORAL; RESPIRATORY (INHALATION) at 08:58

## 2022-07-20 RX ADMIN — Medication 5 MG: at 20:05

## 2022-07-20 RX ADMIN — GABAPENTIN 100 MG: 100 CAPSULE ORAL at 11:41

## 2022-07-20 RX ADMIN — BENZTROPINE MESYLATE 0.5 MG: 0.5 TABLET ORAL at 20:05

## 2022-07-20 ASSESSMENT — ACTIVITIES OF DAILY LIVING (ADL)
ADLS_ACUITY_SCORE: 79

## 2022-07-20 NOTE — PLAN OF CARE
Problem: Behavior Regulation Impairment (Psychotic Signs/Symptoms)  Goal: Improved Behavioral Control (Psychotic Signs/Symptoms)  Outcome: Ongoing, Not Progressing   Goal Outcome Evaluation:    Plan of Care Reviewed With: patient      Patient just the same, isolates himself in his room and withdrawn. He ate 100% of his breakfast and drank an ample amount of fluids. He declined to attend groups. He was laying down in his bed gazing at the ceiling at times. Not seen talking to himself today. He gives minimal responses to questions asked. Patient denies SI/SIB nor halluciantions. Denies wishing himself to be dead. Patient's case was discussed during the team meeting. Per [facility], she is still waiting for the court ruling of patient's guardianship and has updated the [facility] on this regard.    Patient is no longer on SIO. Although his gait is a little bit unsteady bbut able to keep his balance. He just needs a staff to accompany him when walking down the hallway. After lunch, staff reported that the patient almost fell in the lounge. According to the staff, he saw the patient getting up from a heavy chair but he was off-balance moved backward and leaned towards the cabinet found behind him. He refused assistance but was accompanied by a staff back to his room. Patient took all his meds. NO side effects observed.

## 2022-07-20 NOTE — PLAN OF CARE
Problem: Sleep Disturbance (Psychotic Signs/Symptoms)  Goal: Improved Sleep (Psychotic Signs/Symptoms)  Outcome: Ongoing, Progressing   Goal Outcome Evaluation:             Slept well for 8.5 hours  No concerns raised this shift.

## 2022-07-20 NOTE — PROGRESS NOTES
PSYCHIATRY  PROGRESS NOTE     DATE OF SERVICE   07/20/2022       CHIEF COMPLAINT   Patient was admitted due to inability to safely care for himself and psychosis.       SUBJECTIVE   Nursing reports:   Patient just the same, isolates himself in his room and withdrawn. He ate 100% of his breakfast and drank an ample amount of fluids. He declined to attend groups. He was laying down in his bed gazing at the ceiling at times. Not seen talking to himself today. He gives minimal responses to questions asked. Patient denies SI/SIB nor halluciantions. Denies wishing himself to be dead. Patient's case was discussed during the team meeting. Per [facility], she is still waiting for the court ruling of patient's guardianship and has updated the [facility] on this regard.     Patient is no longer on SIO. Although his gait is a little bit unsteady bbut able to keep his balance. He just needs a staff to accompany him when walking down the hallway. After lunch, staff reported that the patient almost fell in the lounge. According to the staff, he saw the patient getting up from a heavy chair but he was off-balance moved backward and leaned towards the cabinet found behind him. He refused assistance but was accompanied by a staff back to his room. Patient took all his meds. NO side effects observed.       Patient has been discussed in detail with the  during team meeting.   informed me that we still have not received any updates from the guardianship process.     OBJECTIVE   Patient was seen and evaluated at bedside by himself, this was a face-to-face evaluation.  Patient continues to be at his baseline and there has been no new behaviors.  Patient stated that he is doing okay and denied all psychiatric symptoms.  He has been spending most of the time in his room and comes out only for meals.     MEDICATIONS   Medications:  Scheduled Meds:    aspirin  81 mg Oral Daily     benztropine  0.5 mg Oral BID      "busPIRone HCl  30 mg Oral BID     cloZAPine  250 mg Oral At Bedtime     donepezil  10 mg Oral At Bedtime     gabapentin  100 mg Oral TID w/meals     levothyroxine  88 mcg Oral Daily     megestrol  20 mg Oral Daily     melatonin  5 mg Oral At Bedtime     memantine  10 mg Oral BID     mirtazapine  15 mg Oral At Bedtime     salmeterol  1 puff Inhalation BID     Continuous Infusions:  PRN Meds:.albuterol, alum & mag hydroxide-simethicone, benzocaine-menthol, hydrOXYzine, nicotine, nystatin, polyethylene glycol, polyethylene glycol-propylene glycol PF, senna-docusate    Medication adherence issues: MS Med Adherence Y/N: Yes, Hospitalization  Medication side effects: MEDICATION SIDE EFFECTS: no side effects reported  Benefit: Yes / No: Yes       ROS   A comprehensive review of systems was negative.       MENTAL STATUS EXAM   Vitals: /78   Pulse 86   Temp 98.3  F (36.8  C) (Temporal)   Resp 16   Ht 1.778 m (5' 10\")   Wt 77.7 kg (171 lb 3.2 oz)   SpO2 97%   BMI 24.56 kg/m       Appearance:  No apparent distress  Mood: \"I am okay\"  Affect: Calm  Suicidal Ideation: Denies  Homicidal Ideation: Denies  Thought process: Disorganized  Thought content: Remains confused and delusional.    Fund of Knowledge: Below average  Attention/Concentration: Poor  Language ability: Significantly impaired  Memory: Global memory impairment  Insight:  Poor.  Judgement: Poor  Orientation: Only to person  Psychomotor Behavior: slowed    Muscle Strength and Tone: MuscleStrength: Normal and Atrophy  Gait and Station: Not evaluated       LABS   personally reviewed.     No results found for: PHENYTOIN, PHENOBARB, VALPROATE, CBMZ       DIAGNOSIS   Principal Problem:    Major neurocognitive disorder, due to multiple etiologies, with behavioral disturbance, severe (H)    Active Problem List:  Patient Active Problem List   Diagnosis     TBI with aggressive behavior     HTN (hypertension)     COPD (chronic obstructive pulmonary disease) (H)     " Dementia with behavioral disturbance (H)     Chronic schizophrenia (H)     Smoker     Agitation     Behavior disturbance     Psychosis (H)     Major neurocognitive disorder, due to multiple etiologies, with behavioral disturbance, severe (H)     Paranoid schizophrenia, chronic condition with acute exacerbation (H)     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 Ongoing education given regarding diagnostic and treatment options with risks, benefits and alternatives and adequate verbalization of understanding.  2.  Medications       BuSpar 30 mg 2 times daily       Cogentin 0.5 mg 2 times a day       Clozaril 250 mg at bedtime       Aricept 10 mg at bedtime       Gabapentin 100 mg 3 times a day       Namenda 10 mg 2 times a day       Remeron 15 mg at bedtime       melatonin 5 mg at bedtime  3.  Medical team following the patient   4.   coordinating a safe discharge plan with the patient.    CBC today is within normal limits.  Risk Assessment: [facility] RISK ASSESSMENT: Patient able to contract for safety    Coordination of Care:   Treatment Plan reviewed and physician signed, Care discussed with Care/Treatment Team Members, Chart reviewed and Patient seen      Re-Certification I certify that the inpatient psychiatric facility services furnished since the previous certification were, and continue to be, medically necessary for, either, treatment which could reasonably be expected to improve the patient s condition or diagnostic study and that the hospital records indicate that the services furnished were, either, intensive treatment services, admission and related services necessary for diagnostic study, or equivalent services.     I certify that the patient continues to need, on a daily basis, active treatment furnished directly by or  requiring the supervision of inpatient psychiatric facility personnel.   I estimate 14 days of hospitalization is necessary for proper treatment of the patient. My plans for post-hospital care for this patient are  TBD     Ginger Dubon MD    -     07/20/2022  -     3:10 PM    Total time 15 minutes with > 50%spent on coordination of cares and psycho-education.    This note was created with help of Dragon dictation system. Grammatical / typing errors are not intentional.    Ginger Dubon MD

## 2022-07-21 PROCEDURE — 250N000013 HC RX MED GY IP 250 OP 250 PS 637: Performed by: PSYCHIATRY & NEUROLOGY

## 2022-07-21 PROCEDURE — 99231 SBSQ HOSP IP/OBS SF/LOW 25: CPT | Performed by: PSYCHIATRY & NEUROLOGY

## 2022-07-21 PROCEDURE — 124N000003 HC R&B MH SENIOR/ADOLESCENT

## 2022-07-21 RX ADMIN — BENZTROPINE MESYLATE 0.5 MG: 0.5 TABLET ORAL at 19:06

## 2022-07-21 RX ADMIN — BUSPIRONE HYDROCHLORIDE 30 MG: 15 TABLET ORAL at 08:37

## 2022-07-21 RX ADMIN — GABAPENTIN 100 MG: 100 CAPSULE ORAL at 17:09

## 2022-07-21 RX ADMIN — BUSPIRONE HYDROCHLORIDE 30 MG: 15 TABLET ORAL at 19:07

## 2022-07-21 RX ADMIN — ASPIRIN 81 MG: 81 TABLET, COATED ORAL at 08:37

## 2022-07-21 RX ADMIN — MEMANTINE 10 MG: 10 TABLET ORAL at 08:38

## 2022-07-21 RX ADMIN — SALMETEROL XINAFOATE 1 PUFF: 50 POWDER, METERED ORAL; RESPIRATORY (INHALATION) at 19:07

## 2022-07-21 RX ADMIN — DONEPEZIL HYDROCHLORIDE 10 MG: 10 TABLET ORAL at 19:06

## 2022-07-21 RX ADMIN — LEVOTHYROXINE SODIUM 88 MCG: 0.09 TABLET ORAL at 08:37

## 2022-07-21 RX ADMIN — CLOZAPINE 250 MG: 100 TABLET ORAL at 19:06

## 2022-07-21 RX ADMIN — Medication 5 MG: at 19:06

## 2022-07-21 RX ADMIN — MIRTAZAPINE 15 MG: 15 TABLET, FILM COATED ORAL at 19:07

## 2022-07-21 RX ADMIN — BENZTROPINE MESYLATE 0.5 MG: 0.5 TABLET ORAL at 08:37

## 2022-07-21 RX ADMIN — MEGESTROL ACETATE 20 MG: 20 TABLET ORAL at 08:37

## 2022-07-21 RX ADMIN — SALMETEROL XINAFOATE 1 PUFF: 50 POWDER, METERED ORAL; RESPIRATORY (INHALATION) at 08:37

## 2022-07-21 RX ADMIN — MEMANTINE 10 MG: 10 TABLET ORAL at 19:06

## 2022-07-21 RX ADMIN — GABAPENTIN 100 MG: 100 CAPSULE ORAL at 11:36

## 2022-07-21 RX ADMIN — GABAPENTIN 100 MG: 100 CAPSULE ORAL at 08:37

## 2022-07-21 ASSESSMENT — ACTIVITIES OF DAILY LIVING (ADL)
ADLS_ACUITY_SCORE: 79

## 2022-07-21 NOTE — PLAN OF CARE
"  Problem: Behavior Regulation Impairment (Psychotic Signs/Symptoms)  Goal: Improved Behavioral Control (Psychotic Signs/Symptoms)  Outcome: Ongoing, Not Progressing     Problem: Cognitive Impairment (Psychotic Signs/Symptoms)  Goal: Optimal Cognitive Function (Psychotic Signs/Symptoms)  Outcome: Ongoing, Not Progressing  Intervention: Support and Promote Cognitive Ability  Recent Flowsheet Documentation  Taken 7/21/2022 1107 by Jonelle Delatorre RN  Trust Relationship/Rapport:    care explained    choices provided    reassurance provided    thoughts/feelings acknowledged   Goal Outcome Evaluation:    Plan of Care Reviewed With: patient     Patient's gait remains unsteady and sometimes off balance. He again almost fell after breakfast. Upon getting up, he took his tray and wanted to place it back into the cart however, he was off-balance, so the staff attempted to help him but he resisted and said \"No\". Patient was walked down back to his room by a staff. Patient still isolative and withdrawn. Denied SI/SIB nor hallucinations. He only gave a \"No\" answer. He looks unkempt; and untidy. His hair is long and greasy. After three attempts, writer and staff were able to convince him to clean himself up. He was given a bedbath by the staff with his minimal participation. He refused to have a shower. Patient is med compliant. He refused to attend groups. He ate good at breakfast but only 25% at  lunch time. Patient is napping majority of the shift.                "

## 2022-07-21 NOTE — PLAN OF CARE
Goal Outcome Evaluation:    Plan of Care Reviewed With: patient      Patient remains isolative to his room and withdrawn. He did not attend groups both this afternoon and this evening. He declined to have a shower both this morning and this evening too. He had Covid Test this shift and the result was negative. Patient denies SI/SIB nor hallucinations. His speech is soft and whispers which can hardly be understood. He gets agitated when you request him to repeat what he says. Patient is med compliant. He ate 75% of his dinner. Encouraged to drink plenty of fluids. His gait and balance remain unsteady. He almost fell this morning.

## 2022-07-21 NOTE — PLAN OF CARE
Problem: Sleep Disturbance (Psychotic Signs/Symptoms)  Goal: Improved Sleep (Psychotic Signs/Symptoms)  Outcome: Ongoing, Progressing   Goal Outcome Evaluation:           Slept uninterrupted for 7.75 hours  No concerns this shift

## 2022-07-21 NOTE — PROGRESS NOTES
PSYCHIATRY  PROGRESS NOTE     DATE OF SERVICE   07/21/2022       CHIEF COMPLAINT   Patient was admitted due to inability to safely care for himself and psychosis.       SUBJECTIVE   Nursing reports:   Patient remains isolative to his room and withdrawn. He did not attend groups both this afternoon and this evening. He declined to have a shower both this morning and this evening too. He had Covid Test this shift and the result was negative. Patient denies SI/SIB nor hallucinations. His speech is soft and whispers which can hardly be understood. He gets agitated when you request him to repeat what he says. Patient is med compliant. He ate 75% of his dinner. Encouraged to drink plenty of fluids. His gait and balance remain unsteady. He almost fell this morning.    Patient has been discussed in detail with the  during team meeting.   informed me that we still have not received any updates from the guardianship process.     OBJECTIVE   Patient was seen and evaluated at bedside by himself, this was a face-to-face evaluation.  Patient remains at his baseline and no new behaviors have been reported.  Earlier this morning we discussed with the nursing staff patient being a fall risk.  The patient refuses to use a walker, he has refused to work with physical therapy and when assisted by the staff he does not want people to help him.  For now we will continue to encourage the patient to walk assisted by a staff more frequently and definitely when he is getting in and out of bed for meals.       MEDICATIONS   Medications:  Scheduled Meds:    aspirin  81 mg Oral Daily     benztropine  0.5 mg Oral BID     busPIRone HCl  30 mg Oral BID     cloZAPine  250 mg Oral At Bedtime     donepezil  10 mg Oral At Bedtime     gabapentin  100 mg Oral TID w/meals     levothyroxine  88 mcg Oral Daily     megestrol  20 mg Oral Daily     melatonin  5 mg Oral At Bedtime     memantine  10 mg Oral BID     mirtazapine  15 mg  "Oral At Bedtime     salmeterol  1 puff Inhalation BID     Continuous Infusions:  PRN Meds:.albuterol, alum & mag hydroxide-simethicone, benzocaine-menthol, hydrOXYzine, nicotine, nystatin, polyethylene glycol, polyethylene glycol-propylene glycol PF, senna-docusate    Medication adherence issues: MS Med Adherence Y/N: Yes, Hospitalization  Medication side effects: MEDICATION SIDE EFFECTS: no side effects reported  Benefit: Yes / No: Yes       ROS   A comprehensive review of systems was negative.       MENTAL STATUS EXAM   Vitals: /86 (BP Location: Right arm, Patient Position: Sitting)   Pulse 116   Temp 98  F (36.7  C) (Oral)   Resp 20   Ht 1.778 m (5' 10\")   Wt 77.7 kg (171 lb 3.2 oz)   SpO2 99%   BMI 24.56 kg/m       Same as last visit  Appearance:  No apparent distress  Mood: \"I am okay\"  Affect: Calm  Suicidal Ideation: Denies  Homicidal Ideation: Denies  Thought process: Disorganized  Thought content: Remains confused and delusional.    Fund of Knowledge: Below average  Attention/Concentration: Poor  Language ability: Significantly impaired  Memory: Global memory impairment  Insight:  Poor.  Judgement: Poor  Orientation: Only to person  Psychomotor Behavior: slowed    Muscle Strength and Tone: MuscleStrength: Normal and Atrophy  Gait and Station: Not evaluated       LABS   personally reviewed.     No results found for: PHENYTOIN, PHENOBARB, VALPROATE, CBMZ       DIAGNOSIS   Principal Problem:    Major neurocognitive disorder, due to multiple etiologies, with behavioral disturbance, severe (H)    Active Problem List:  Patient Active Problem List   Diagnosis     TBI with aggressive behavior     HTN (hypertension)     COPD (chronic obstructive pulmonary disease) (H)     Dementia with behavioral disturbance (H)     Chronic schizophrenia (H)     Smoker     Agitation     Behavior disturbance     Psychosis (H)     Major neurocognitive disorder, due to multiple etiologies, with behavioral disturbance, " severe (H)     Paranoid schizophrenia, chronic condition with acute exacerbation (H)     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 Ongoing education given regarding diagnostic and treatment options with risks, benefits and alternatives and adequate verbalization of understanding.  2.  Medications       BuSpar 30 mg 2 times daily       Cogentin 0.5 mg 2 times a day       Clozaril 250 mg at bedtime       Aricept 10 mg at bedtime       Gabapentin 100 mg 3 times a day       Namenda 10 mg 2 times a day       Remeron 15 mg at bedtime       melatonin 5 mg at bedtime  3.  Medical team following the patient   4.   coordinating a safe discharge plan with the patient.    CBC today is within normal limits.  Risk Assessment: Health system RISK ASSESSMENT: Patient able to contract for safety    Coordination of Care:   Treatment Plan reviewed and physician signed, Care discussed with Care/Treatment Team Members, Chart reviewed and Patient seen      Re-Certification I certify that the inpatient psychiatric facility services furnished since the previous certification were, and continue to be, medically necessary for, either, treatment which could reasonably be expected to improve the patient s condition or diagnostic study and that the hospital records indicate that the services furnished were, either, intensive treatment services, admission and related services necessary for diagnostic study, or equivalent services.     I certify that the patient continues to need, on a daily basis, active treatment furnished directly by or requiring the supervision of inpatient psychiatric facility personnel.   I estimate 14 days of hospitalization is necessary for proper treatment of the patient. My plans for post-hospital care for this patient are  TBD     Ginger  MD Ling    -     07/21/2022  -     2:06 PM    Total time 15 minutes with > 50%spent on coordination of cares and psycho-education.    This note was created with help of Dragon dictation system. Grammatical / typing errors are not intentional.    Ginger Dubon MD

## 2022-07-22 LAB
BASOPHILS # BLD AUTO: 0.1 10E3/UL (ref 0–0.2)
BASOPHILS NFR BLD AUTO: 1 %
EOSINOPHIL # BLD AUTO: 1.6 10E3/UL (ref 0–0.7)
EOSINOPHIL NFR BLD AUTO: 22 %
ERYTHROCYTE [DISTWIDTH] IN BLOOD BY AUTOMATED COUNT: 14 % (ref 10–15)
HCT VFR BLD AUTO: 41.3 % (ref 40–53)
HGB BLD-MCNC: 14.9 G/DL (ref 13.3–17.7)
IMM GRANULOCYTES # BLD: 0 10E3/UL
IMM GRANULOCYTES NFR BLD: 1 %
LYMPHOCYTES # BLD AUTO: 1.9 10E3/UL (ref 0.8–5.3)
LYMPHOCYTES NFR BLD AUTO: 26 %
MCH RBC QN AUTO: 32.5 PG (ref 26.5–33)
MCHC RBC AUTO-ENTMCNC: 36.1 G/DL (ref 31.5–36.5)
MCV RBC AUTO: 90 FL (ref 78–100)
MONOCYTES # BLD AUTO: 0.8 10E3/UL (ref 0–1.3)
MONOCYTES NFR BLD AUTO: 10 %
NEUTROPHILS # BLD AUTO: 2.9 10E3/UL (ref 1.6–8.3)
NEUTROPHILS NFR BLD AUTO: 40 %
NRBC # BLD AUTO: 0 10E3/UL
NRBC BLD AUTO-RTO: 0 /100
PLATELET # BLD AUTO: 172 10E3/UL (ref 150–450)
RBC # BLD AUTO: 4.58 10E6/UL (ref 4.4–5.9)
WBC # BLD AUTO: 7.2 10E3/UL (ref 4–11)

## 2022-07-22 PROCEDURE — 250N000013 HC RX MED GY IP 250 OP 250 PS 637: Performed by: PSYCHIATRY & NEUROLOGY

## 2022-07-22 PROCEDURE — 85025 COMPLETE CBC W/AUTO DIFF WBC: CPT | Performed by: PSYCHIATRY & NEUROLOGY

## 2022-07-22 PROCEDURE — 36416 COLLJ CAPILLARY BLOOD SPEC: CPT | Performed by: PSYCHIATRY & NEUROLOGY

## 2022-07-22 PROCEDURE — 124N000003 HC R&B MH SENIOR/ADOLESCENT

## 2022-07-22 PROCEDURE — 99231 SBSQ HOSP IP/OBS SF/LOW 25: CPT | Performed by: PSYCHIATRY & NEUROLOGY

## 2022-07-22 RX ADMIN — GABAPENTIN 100 MG: 100 CAPSULE ORAL at 17:22

## 2022-07-22 RX ADMIN — MEGESTROL ACETATE 20 MG: 20 TABLET ORAL at 08:28

## 2022-07-22 RX ADMIN — LEVOTHYROXINE SODIUM 88 MCG: 0.09 TABLET ORAL at 08:28

## 2022-07-22 RX ADMIN — BUSPIRONE HYDROCHLORIDE 30 MG: 15 TABLET ORAL at 08:28

## 2022-07-22 RX ADMIN — DONEPEZIL HYDROCHLORIDE 10 MG: 10 TABLET ORAL at 19:24

## 2022-07-22 RX ADMIN — Medication 5 MG: at 19:23

## 2022-07-22 RX ADMIN — CLOZAPINE 250 MG: 100 TABLET ORAL at 19:23

## 2022-07-22 RX ADMIN — MEMANTINE 10 MG: 10 TABLET ORAL at 08:28

## 2022-07-22 RX ADMIN — GABAPENTIN 100 MG: 100 CAPSULE ORAL at 08:28

## 2022-07-22 RX ADMIN — BENZTROPINE MESYLATE 0.5 MG: 0.5 TABLET ORAL at 08:28

## 2022-07-22 RX ADMIN — SALMETEROL XINAFOATE 1 PUFF: 50 POWDER, METERED ORAL; RESPIRATORY (INHALATION) at 19:26

## 2022-07-22 RX ADMIN — MEMANTINE 10 MG: 10 TABLET ORAL at 19:24

## 2022-07-22 RX ADMIN — BUSPIRONE HYDROCHLORIDE 30 MG: 15 TABLET ORAL at 19:24

## 2022-07-22 RX ADMIN — SALMETEROL XINAFOATE 1 PUFF: 50 POWDER, METERED ORAL; RESPIRATORY (INHALATION) at 08:28

## 2022-07-22 RX ADMIN — ASPIRIN 81 MG: 81 TABLET, COATED ORAL at 08:28

## 2022-07-22 RX ADMIN — GABAPENTIN 100 MG: 100 CAPSULE ORAL at 11:34

## 2022-07-22 RX ADMIN — BENZTROPINE MESYLATE 0.5 MG: 0.5 TABLET ORAL at 19:23

## 2022-07-22 RX ADMIN — MIRTAZAPINE 15 MG: 15 TABLET, FILM COATED ORAL at 19:24

## 2022-07-22 ASSESSMENT — ACTIVITIES OF DAILY LIVING (ADL)
ADLS_ACUITY_SCORE: 79

## 2022-07-22 NOTE — PROGRESS NOTES
"PSYCHIATRY  PROGRESS NOTE     DATE OF SERVICE   07/22/2022       CHIEF COMPLAINT   Patient was admitted due to inability to safely care for himself and psychosis.       SUBJECTIVE   Nursing reports:   Patient remains isolative and withdrawn. Still looks untidy and unkempt despite having a bed bath yesterday. Patient refused to shower both yesterday and today. He ate 100% of his breakfast and went back to his room laying down; napping majority of the shift. He is arousable though. He is alert and oriented to self only. His speech is soft and mostly he whispers whenever he answers questions. His verbal responses mostly in a \"Yes or No\", very limited. He denies SI/SIB nor hallucinations. Has not been observed to talking to self. He is med compliant. His gait remains unsteady and needs a staff to accompany him while ambulating in the hallway. He is independent on mostly of his adl's except grooming and bathing. Per CTC, the team still continues to wait for the guardianship court ruling.     Patient has been discussed in detail with the  during team meeting.   informed me that we still have not received any updates from the guardianship process.     OBJECTIVE   Patient was seen and evaluated at bedside by himself, this was a face-to-face evaluation.  Patient continues at his baseline and no new behaviors have been reported.  He spends most of the time in his bed and is not interested in interacting with peers or staff.  Continues to be delusional and at times hallucinating.     MEDICATIONS   Medications:  Scheduled Meds:    aspirin  81 mg Oral Daily     benztropine  0.5 mg Oral BID     busPIRone HCl  30 mg Oral BID     cloZAPine  250 mg Oral At Bedtime     donepezil  10 mg Oral At Bedtime     gabapentin  100 mg Oral TID w/meals     levothyroxine  88 mcg Oral Daily     megestrol  20 mg Oral Daily     melatonin  5 mg Oral At Bedtime     memantine  10 mg Oral BID     mirtazapine  15 mg Oral At " "Bedtime     salmeterol  1 puff Inhalation BID     Continuous Infusions:  PRN Meds:.albuterol, alum & mag hydroxide-simethicone, benzocaine-menthol, hydrOXYzine, nicotine, nystatin, polyethylene glycol, polyethylene glycol-propylene glycol PF, senna-docusate    Medication adherence issues: MS Med Adherence Y/N: Yes, Hospitalization  Medication side effects: MEDICATION SIDE EFFECTS: no side effects reported  Benefit: Yes / No: Yes       ROS   A comprehensive review of systems was negative.       MENTAL STATUS EXAM   Vitals: /82 (BP Location: Left arm)   Pulse 88   Temp 97.8  F (36.6  C) (Temporal)   Resp 16   Ht 1.778 m (5' 10\")   Wt 77.7 kg (171 lb 3.2 oz)   SpO2 99%   BMI 24.56 kg/m       Same as last visit  Appearance:  No apparent distress  Mood: \"I am okay\"  Affect: Calm  Suicidal Ideation: Denies  Homicidal Ideation: Denies  Thought process: Disorganized  Thought content: Remains confused and delusional.    Fund of Knowledge: Below average  Attention/Concentration: Poor  Language ability: Significantly impaired  Memory: Global memory impairment  Insight:  Poor.  Judgement: Poor  Orientation: Only to person  Psychomotor Behavior: slowed    Muscle Strength and Tone: MuscleStrength: Normal and Atrophy  Gait and Station: Not evaluated       LABS   personally reviewed.     No results found for: PHENYTOIN, PHENOBARB, VALPROATE, CBMZ       DIAGNOSIS   Principal Problem:    Major neurocognitive disorder, due to multiple etiologies, with behavioral disturbance, severe (H)    Active Problem List:  Patient Active Problem List   Diagnosis     TBI with aggressive behavior     HTN (hypertension)     COPD (chronic obstructive pulmonary disease) (H)     Dementia with behavioral disturbance (H)     Chronic schizophrenia (H)     Smoker     Agitation     Behavior disturbance     Psychosis (H)     Major neurocognitive disorder, due to multiple etiologies, with behavioral disturbance, severe (H)     Paranoid " schizophrenia, chronic condition with acute exacerbation (H)     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 Ongoing education given regarding diagnostic and treatment options with risks, benefits and alternatives and adequate verbalization of understanding.  2.  Medications       BuSpar 30 mg 2 times daily       Cogentin 0.5 mg 2 times a day       Clozaril 250 mg at bedtime       Aricept 10 mg at bedtime       Gabapentin 100 mg 3 times a day       Namenda 10 mg 2 times a day       Remeron 15 mg at bedtime       melatonin 5 mg at bedtime  3.  Medical team following the patient   4.   coordinating a safe discharge plan with the patient.    CBC today is within normal limits.  Risk Assessment: [facility] RISK ASSESSMENT: Patient able to contract for safety    Coordination of Care:   Treatment Plan reviewed and physician signed, Care discussed with Care/Treatment Team Members, Chart reviewed and Patient seen      Re-Certification I certify that the inpatient psychiatric facility services furnished since the previous certification were, and continue to be, medically necessary for, either, treatment which could reasonably be expected to improve the patient s condition or diagnostic study and that the hospital records indicate that the services furnished were, either, intensive treatment services, admission and related services necessary for diagnostic study, or equivalent services.     I certify that the patient continues to need, on a daily basis, active treatment furnished directly by or requiring the supervision of inpatient psychiatric facility personnel.   I estimate 14 days of hospitalization is necessary for proper treatment of the patient. My plans for post-hospital care for this patient are  TBD     Ginger Dubon MD    -      07/22/2022  -     3:00 PM    Total time 15 minutes with > 50%spent on coordination of cares and psycho-education.    This note was created with help of Dragon dictation system. Grammatical / typing errors are not intentional.    Ginger Dubon MD

## 2022-07-22 NOTE — PROGRESS NOTES
Pt appeared to be sleeping comfortably as observed during safety rounds. Pt slept a total of about 9 hours. No concerns reported or noted this shift.

## 2022-07-22 NOTE — PLAN OF CARE
Problem: Behavior Regulation Impairment (Psychotic Signs/Symptoms)  Goal: Improved Behavioral Control (Psychotic Signs/Symptoms)  Outcome: Ongoing, Not Progressing     Problem: Behavioral Health Plan of Care  Goal: Plan of Care Review  Outcome: Ongoing, Not Progressing   Goal Outcome Evaluation:    Patient remains isolative/withdrawn into his room. He only came out for dinner, ate 100% of his food and went back to bed. Patient need a lot of encouragement to attend to his ADLs, he declined to shower this evening. Grooming is neglected. Gait is unsteady, he ambulated from his room to the dining room and back to his room with SBA. He remains falls risk due to unsteady gait. Patient is medication compliant, no side effects noted. Patient denies SI/SIB/AVH/HI. His VSS. Staff will continue to monitor.

## 2022-07-22 NOTE — PLAN OF CARE
"  Problem: Behavior Regulation Impairment (Psychotic Signs/Symptoms)  Goal: Improved Behavioral Control (Psychotic Signs/Symptoms)  Outcome: Ongoing, Not Progressing   Goal Outcome Evaluation:    Plan of Care Reviewed With: patient      Patient remains isolative and withdrawn. Still looks untidy and unkempt despite having a bed bath yesterday. Patient refused to shower both yesterday and today. He ate 100% of his breakfast and went back to his room laying down; napping majority of the shift. He is arousable though. He is alert and oriented to self only. His speech is soft and mostly he whispers whenever he answers questions. His verbal responses mostly in a \"Yes or No\", very limited. He denies SI/SIB nor hallucinations. Has not been observed to talking to self. He is med compliant. His gait remains unsteady and needs a staff to accompany him while ambulating in the hallway. He is independent on mostly of his adl's except grooming and bathing. Per CTC, the team still continues to wait for the guardianship court ruling.            "

## 2022-07-23 PROCEDURE — 250N000013 HC RX MED GY IP 250 OP 250 PS 637: Performed by: PSYCHIATRY & NEUROLOGY

## 2022-07-23 PROCEDURE — 124N000003 HC R&B MH SENIOR/ADOLESCENT

## 2022-07-23 RX ADMIN — MIRTAZAPINE 15 MG: 15 TABLET, FILM COATED ORAL at 20:04

## 2022-07-23 RX ADMIN — BUSPIRONE HYDROCHLORIDE 30 MG: 15 TABLET ORAL at 20:04

## 2022-07-23 RX ADMIN — BUSPIRONE HYDROCHLORIDE 30 MG: 15 TABLET ORAL at 08:35

## 2022-07-23 RX ADMIN — Medication 5 MG: at 20:04

## 2022-07-23 RX ADMIN — MEMANTINE 10 MG: 10 TABLET ORAL at 08:35

## 2022-07-23 RX ADMIN — BENZTROPINE MESYLATE 0.5 MG: 0.5 TABLET ORAL at 20:04

## 2022-07-23 RX ADMIN — MEGESTROL ACETATE 20 MG: 20 TABLET ORAL at 08:35

## 2022-07-23 RX ADMIN — GABAPENTIN 100 MG: 100 CAPSULE ORAL at 08:35

## 2022-07-23 RX ADMIN — DONEPEZIL HYDROCHLORIDE 10 MG: 10 TABLET ORAL at 20:04

## 2022-07-23 RX ADMIN — SALMETEROL XINAFOATE 1 PUFF: 50 POWDER, METERED ORAL; RESPIRATORY (INHALATION) at 08:43

## 2022-07-23 RX ADMIN — GABAPENTIN 100 MG: 100 CAPSULE ORAL at 11:26

## 2022-07-23 RX ADMIN — BENZTROPINE MESYLATE 0.5 MG: 0.5 TABLET ORAL at 08:35

## 2022-07-23 RX ADMIN — SALMETEROL XINAFOATE 1 PUFF: 50 POWDER, METERED ORAL; RESPIRATORY (INHALATION) at 20:04

## 2022-07-23 RX ADMIN — GABAPENTIN 100 MG: 100 CAPSULE ORAL at 17:26

## 2022-07-23 RX ADMIN — CLOZAPINE 250 MG: 100 TABLET ORAL at 20:04

## 2022-07-23 RX ADMIN — ASPIRIN 81 MG: 81 TABLET, COATED ORAL at 08:35

## 2022-07-23 RX ADMIN — MEMANTINE 10 MG: 10 TABLET ORAL at 20:04

## 2022-07-23 RX ADMIN — LEVOTHYROXINE SODIUM 88 MCG: 0.09 TABLET ORAL at 08:35

## 2022-07-23 ASSESSMENT — ACTIVITIES OF DAILY LIVING (ADL)
ADLS_ACUITY_SCORE: 79
ADLS_ACUITY_SCORE: 75
ADLS_ACUITY_SCORE: 78
ADLS_ACUITY_SCORE: 79
ADLS_ACUITY_SCORE: 79
ADLS_ACUITY_SCORE: 78
ADLS_ACUITY_SCORE: 78
ADLS_ACUITY_SCORE: 75

## 2022-07-23 NOTE — PLAN OF CARE
"  Problem: Decreased Participation and Engagement (Psychotic Signs/Symptoms)  Goal: Increased Participation and Engagement (Psychotic Signs/Symptoms)  Outcome: Ongoing, Not Progressing     Problem: Psychomotor Impairment (Psychotic Signs/Symptoms)  Goal: Improved Psychomotor Symptoms (Psychotic Signs/Symptoms)  Outcome: Ongoing, Not Progressing  Intervention: Manage Psychomotor Movement  Recent Flowsheet Documentation  Taken 7/23/2022 1016 by Praveena Delong RN  Patient Performed Hygiene: (allowed incontinent care ,pullups and pants changed, refused to have scrub top changed) incontinence care     Problem: Social, Occupational or Functional Impairment (Psychotic Signs/Symptoms)  Goal: Enhanced Social, Occupational or Functional Skills (Psychotic Signs/Symptoms)  Outcome: Ongoing, Not Progressing  Intervention: Promote Social, Occupational and Functional Ability  Recent Flowsheet Documentation  Taken 7/23/2022 1016 by Praveena Delong RN  Trust Relationship/Rapport:    care explained    emotional support provided    reassurance provided     Pt remained Isolative and withdrawn, Came out for meals and ate 75 % of breakfast and   50 % of  lunch but immediately went back to his room to lay down.Assisted by staff when he ambulated to and from the room  Has poor eye contact and with soft garbled speech.  Pt was medication complaint.  Incontinent of urine, allowed jeanie care with change of pull ups and scrub pants.  Refused to have the scrub top changed, got irritated with this writer and yelled  \" not the top\".  Denies mental health issues. Oriented to self and disoriented to situation.  Encouraged to attend groups but declined                "

## 2022-07-23 NOTE — PLAN OF CARE
Goal Outcome Evaluation:    Problem: Sleep Disturbance (Psychotic Signs/Symptoms)  Goal: Improved Sleep (Psychotic Signs/Symptoms)  Outcome: Ongoing, Progressing     Pt slept 12 hours through the night uninterrupted.

## 2022-07-23 NOTE — PLAN OF CARE
Goal Outcome Evaluation:    Plan of Care Reviewed With: patient       Patient's mood is calm with a flat affect. He is isolative to his room and withdrawn. He declined to attend groups. He laid down in his bed, napping majority of the shift. He just came out for dinner and went back to his room. He ate 75% of his dinner and drank 1 small glass of orange juice and water. He is med compliant. No side effects of his medications he is taking. Offered minimal verbal responses to questions asked. He denied SI/SIB nor hallucinations. Denied any mental health issues. With poor insights of his hospitalization. Per CTC, the team is waiting for the court's guardianship ruling.

## 2022-07-24 PROCEDURE — 124N000003 HC R&B MH SENIOR/ADOLESCENT

## 2022-07-24 PROCEDURE — 250N000013 HC RX MED GY IP 250 OP 250 PS 637: Performed by: PSYCHIATRY & NEUROLOGY

## 2022-07-24 RX ADMIN — DONEPEZIL HYDROCHLORIDE 10 MG: 10 TABLET ORAL at 20:34

## 2022-07-24 RX ADMIN — MIRTAZAPINE 15 MG: 15 TABLET, FILM COATED ORAL at 20:34

## 2022-07-24 RX ADMIN — BENZTROPINE MESYLATE 0.5 MG: 0.5 TABLET ORAL at 09:04

## 2022-07-24 RX ADMIN — GABAPENTIN 100 MG: 100 CAPSULE ORAL at 17:18

## 2022-07-24 RX ADMIN — CLOZAPINE 250 MG: 100 TABLET ORAL at 20:33

## 2022-07-24 RX ADMIN — Medication 5 MG: at 20:34

## 2022-07-24 RX ADMIN — BUSPIRONE HYDROCHLORIDE 30 MG: 15 TABLET ORAL at 09:04

## 2022-07-24 RX ADMIN — SALMETEROL XINAFOATE 1 PUFF: 50 POWDER, METERED ORAL; RESPIRATORY (INHALATION) at 09:05

## 2022-07-24 RX ADMIN — GABAPENTIN 100 MG: 100 CAPSULE ORAL at 13:16

## 2022-07-24 RX ADMIN — MEMANTINE 10 MG: 10 TABLET ORAL at 20:34

## 2022-07-24 RX ADMIN — LEVOTHYROXINE SODIUM 88 MCG: 0.09 TABLET ORAL at 09:04

## 2022-07-24 RX ADMIN — MEMANTINE 10 MG: 10 TABLET ORAL at 09:04

## 2022-07-24 RX ADMIN — SALMETEROL XINAFOATE 1 PUFF: 50 POWDER, METERED ORAL; RESPIRATORY (INHALATION) at 20:35

## 2022-07-24 RX ADMIN — ASPIRIN 81 MG: 81 TABLET, COATED ORAL at 09:04

## 2022-07-24 RX ADMIN — BUSPIRONE HYDROCHLORIDE 30 MG: 15 TABLET ORAL at 20:34

## 2022-07-24 RX ADMIN — MEGESTROL ACETATE 20 MG: 20 TABLET ORAL at 09:04

## 2022-07-24 RX ADMIN — BENZTROPINE MESYLATE 0.5 MG: 0.5 TABLET ORAL at 20:34

## 2022-07-24 RX ADMIN — GABAPENTIN 100 MG: 100 CAPSULE ORAL at 09:04

## 2022-07-24 ASSESSMENT — ACTIVITIES OF DAILY LIVING (ADL)
ADLS_ACUITY_SCORE: 78
ADLS_ACUITY_SCORE: 79
ADLS_ACUITY_SCORE: 78
ADLS_ACUITY_SCORE: 79
ADLS_ACUITY_SCORE: 78
ADLS_ACUITY_SCORE: 79
ADLS_ACUITY_SCORE: 78

## 2022-07-24 NOTE — PLAN OF CARE
"Goal Outcome Evaluation:    Plan of Care Reviewed With: patient        Pt isolated to room lying in bed nearly entire shift except to come out for dinner. Ate 100% of dinner tray. Drank several juice containers. Med-compliant. Hygiene poor. Energy & motivation poor. Impoverished thought and speech. Responds to questions with one word answers. When prompted to go to bathroom at HS prior to falling asleep, he declined. Asked writer \"can I go have a cigarette?\" Declined offer of nicotine gum. Continue with current treatment plan and recommendations. Continue to monitor and reassess symptoms. Monitor response to medications. Monitor progress towards treatment goals. Encourage groups and participation.          "

## 2022-07-24 NOTE — PLAN OF CARE
"  Problem: Psychomotor Impairment (Psychotic Signs/Symptoms)  Goal: Improved Psychomotor Symptoms (Psychotic Signs/Symptoms)  Outcome: Ongoing, Progressing  Flowsheets (Taken 7/24/2022 1120)  Mutually Determined Action Steps (Improved Psychomotor Symptoms):   adheres to medication regimen   exhibits decrease in agitation   Goal Outcome Evaluation:    Plan of Care Reviewed With: patient        Nursing Assessment    Psychosis (H) [F29]    Admit Date: 1/26/2022    Length of Stay: 179    Patient evaluation continues. Assessed mood,anxiety,thoughts and behavior. Patient is progressing towards goals. Patient is encouraged to participate in groups and enjoys bingo.Pt is assisted to develop healthy coping skills.  Patient admits to  auditory hallucinations. /88   Pulse 71   Temp 97.8  F (36.6  C) (Temporal)   Resp 18   Ht 1.778 m (5' 10\")   Wt 77.8 kg (171 lb 8 oz)   SpO2 99%   BMI 24.61 kg/m      Mood: good as stated by pt    Patient reports depression none and reports anxiety none    Affect:flat ,blunted, perplexed    Sleep: 10 hours last night    Appetite: good 75%    SI: denies    HI: denies    SIB: denies      Medication Compliance: Medication compliant    Group participation: bingo only    ADL's:  assist of staff 1 person    Fall risk interventions: proper foot wear, orthostatic B/P, standby assist    Rinku Score Interventions: none    Discharge planning in process    Refer to daily team meeting notes for individualized plan of care. Nursing will continue to assess.    *Scale is 1-10 and 10 is the worst.                "

## 2022-07-24 NOTE — PLAN OF CARE
"Goal Outcome Evaluation:    Plan of Care Reviewed With: patient        Pt isolated to room except to come out for dinner. When told that dinner had arrived, he asked \"where?\" Reorientation offered. Unsteady initially when arising up from bed but gait steady while walking to dining room. Appears weak with some SOB while ambulating. When asked how he was feeling, he stated \"tired.\" Ate 100% of dinner tray. Drank several juice containers. Med-compliant. Hygiene poor. Energy & motivation poor. Impoverished thought and speech. Responds to questions with one word answers. When prompted to go to bathroom at  prior to falling asleep, he declined. Declined assistance with shower. Continue with current treatment plan and recommendations. Continue to monitor and reassess symptoms. Monitor response to medications. Monitor progress towards treatment goals. Encourage groups and participation.          "

## 2022-07-24 NOTE — PLAN OF CARE
Goal Outcome Evaluation:    Patient has remained in his room sleeping for the entire shift.  No concerns reported.  Up to restroom independently X1.  No episodes of incontinence this shift.  Stable gait observed.  10.5 hrs of sleep.

## 2022-07-25 PROCEDURE — 124N000003 HC R&B MH SENIOR/ADOLESCENT

## 2022-07-25 PROCEDURE — 99231 SBSQ HOSP IP/OBS SF/LOW 25: CPT | Performed by: PSYCHIATRY & NEUROLOGY

## 2022-07-25 PROCEDURE — 250N000013 HC RX MED GY IP 250 OP 250 PS 637: Performed by: PSYCHIATRY & NEUROLOGY

## 2022-07-25 RX ADMIN — BENZTROPINE MESYLATE 0.5 MG: 0.5 TABLET ORAL at 19:43

## 2022-07-25 RX ADMIN — LEVOTHYROXINE SODIUM 88 MCG: 0.09 TABLET ORAL at 09:31

## 2022-07-25 RX ADMIN — GABAPENTIN 100 MG: 100 CAPSULE ORAL at 12:07

## 2022-07-25 RX ADMIN — Medication 5 MG: at 19:43

## 2022-07-25 RX ADMIN — BUSPIRONE HYDROCHLORIDE 30 MG: 15 TABLET ORAL at 09:31

## 2022-07-25 RX ADMIN — CLOZAPINE 250 MG: 100 TABLET ORAL at 19:43

## 2022-07-25 RX ADMIN — SALMETEROL XINAFOATE 1 PUFF: 50 POWDER, METERED ORAL; RESPIRATORY (INHALATION) at 19:43

## 2022-07-25 RX ADMIN — GABAPENTIN 100 MG: 100 CAPSULE ORAL at 09:31

## 2022-07-25 RX ADMIN — MEGESTROL ACETATE 20 MG: 20 TABLET ORAL at 09:31

## 2022-07-25 RX ADMIN — GABAPENTIN 100 MG: 100 CAPSULE ORAL at 17:14

## 2022-07-25 RX ADMIN — MEMANTINE 10 MG: 10 TABLET ORAL at 19:43

## 2022-07-25 RX ADMIN — DONEPEZIL HYDROCHLORIDE 10 MG: 10 TABLET ORAL at 19:43

## 2022-07-25 RX ADMIN — MIRTAZAPINE 15 MG: 15 TABLET, FILM COATED ORAL at 19:43

## 2022-07-25 RX ADMIN — ASPIRIN 81 MG: 81 TABLET, COATED ORAL at 09:30

## 2022-07-25 RX ADMIN — BENZTROPINE MESYLATE 0.5 MG: 0.5 TABLET ORAL at 09:31

## 2022-07-25 RX ADMIN — BUSPIRONE HYDROCHLORIDE 30 MG: 15 TABLET ORAL at 19:43

## 2022-07-25 RX ADMIN — MEMANTINE 10 MG: 10 TABLET ORAL at 09:31

## 2022-07-25 RX ADMIN — SALMETEROL XINAFOATE 1 PUFF: 50 POWDER, METERED ORAL; RESPIRATORY (INHALATION) at 09:37

## 2022-07-25 ASSESSMENT — ACTIVITIES OF DAILY LIVING (ADL)
ADLS_ACUITY_SCORE: 78
ADLS_ACUITY_SCORE: 75
ADLS_ACUITY_SCORE: 78
ADLS_ACUITY_SCORE: 75
ADLS_ACUITY_SCORE: 75
ADLS_ACUITY_SCORE: 78

## 2022-07-25 NOTE — PLAN OF CARE
Goal Outcome Evaluation:    Plan of Care Reviewed With: patient     John was up ad holley, spent nearly the entire shift in his room resting/napping. Staff encouraged Pt to come out of his room and attend programming, which he refused.   Pt ate 50% of each meal, his fluid intake was approximately 600 ml this shift. Pt is disoriented to place, time, and situation. John was med adherent, cooperative with vital signs. Pt was minimally conversant with writer/staff, denied having suicidal ideation or self harm thoughts.

## 2022-07-25 NOTE — PLAN OF CARE
Problem: Sleep Disturbance (Psychotic Signs/Symptoms)  Goal: Improved Sleep (Psychotic Signs/Symptoms)  Outcome: Progressing   Goal Outcome Evaluation:    Patient has remained in his room sleeping for the entire shift.  No concerns reported.  Incontinent of urine.  Assistance with jeanie care and new pull ups provided.  9 hrs of sleep.

## 2022-07-25 NOTE — PLAN OF CARE
07/25/22 1047   Individualization/Patient Specific Goals   Patient Personal Strengths resilient   Patient Vulnerabilities lacks insight into illness   Anxieties, Fears or Concerns Patient is waiting for guardianship ruling.   Individualized Care Needs Patient is not able to care for himself, he needs assistance with all ADLs, patient is at baseline, at baseline has delusions.   Patient-Specific Goals (Include Timeframe) 10-14 days   Interprofessional Rounds   Summary No new updates regarding guardianship ruling. Team continues to wait. Patient is at baseline and continues to need assistance with cares.   Participants nursing;psychiatrist;CTC   Team Discussion   Participants Dr. Root, [facility], RN   Progress No new progress   Anticipated length of stay 10-14 days   Continued Stay Criteria/Rationale Patient can not discharge the hospital safely. Patient/team is waiting for guardianship ruling to finalize, patient will receive [place] Choice Assessment, and then go to [facility] or [facility].   Medical/Physical Please see H and P.   Plan Continue with plan: Medication management, psychiatric evaluations, nursing assessments, group therapy, milieu therapy, and CTC case management.   Anticipated Discharge Disposition assisted living     PRECAUTIONS AND SAFETY    Behavioral Orders   Procedures    Code 1 - Restrict to Unit    Code 2     CT scan only    Code 3     Patient can go out of the unit with staff supervision. He needs to be taken out by him self with 2 staff and security present.    Fall precautions    Routine Programming     As clinically indicated    Status 15     Every 15 minutes.    Status Individual Observation     Patient SIO status reviewed with team/RN.  Please also refer to RN/team documentation for add'l detail.    -SIO staff to monitor following which have contributed to patient being on SIO:  Unresponsive episode with increased confusion  -Possible interventions SIO staff could use to support  patient's treatment progress:  Monitor patient for further unresponsive episodes, SBA with cares and ambulation  -When following observed, team will review discontinuation of SIO:  Improved medical condition     Order Specific Question:   CONTINUOUS 24 hours / day     Answer:   Other     Order Specific Question:   Specify distance     Answer:   10ft     Order Specific Question:   Indications for SIO     Answer:   Self-injury risk    Status Individual Observation     Patient SIO status reviewed with team/RN.  Please also refer to RN/team documentation for add'l detail.    -SIO staff to monitor following which have contributed to patient being on SIO:  Unresponsive episode with increased confusion  -Possible interventions SIO staff could use to support patient's treatment progress:  Monitor patient for further unresponsive episodes, SBA with cares and ambulation  -When following observed, team will review discontinuation of SIO:  Improved medical condition     Order Specific Question:   CONTINUOUS 24 hours / day     Answer:   Other     Order Specific Question:   Specify distance     Answer:   10ft     Order Specific Question:   Indications for SIO     Answer:   Self-injury risk       Safety  Safety WDL: WDL  Patient Location: lounge  Observed Behavior: calm  Observed Behavior (Comment): napping  Safety Measures: environmental rounds completed, clinical history reviewed, safety plan reviewed, safety rounds completed, suicide check-in completed  Diversional Activity:  (bingo)  Suicidality: Behavioral scrubs (pajamas), Free time and quiet time in lounge, Unpredictable frequency of checking on patient, Identify and strengthen protective factors, Promote patient engagement with treatment process  Seizure precautions: calm, consistent lighting  Assault: status 15  Elopement Assessment: Distress about legal situation (holds for mental health or criminal)  Elopement Interventions: status 15  Sexual: status 15

## 2022-07-25 NOTE — PROGRESS NOTES
PSYCHIATRY  PROGRESS NOTE     DATE OF SERVICE   07/25/2022       CHIEF COMPLAINT   Patient was admitted due to inability to safely care for himself and psychosis.       SUBJECTIVE   Nursing reports:   Patient has remained in his room sleeping for the entire shift.  No concerns reported.  Incontinent of urine.  Assistance with jeanie care and new pull ups provided.  9 hrs of sleep.       Patient has been discussed in detail with the  during team meeting.   informed me that we still have not received any updates from the guardianship process.   has continue to follow up on referrals for nursing homes.     OBJECTIVE   Patient was seen and evaluated at bedside by himself, this was a face-to-face evaluation.  Patient reported that he is doing okay and denied all psychiatric symptoms.  He remains confused, delusional and at times hallucinating.  For the most part patient has been calm, cooperative and compliant with his medications.       MEDICATIONS   Medications:  Scheduled Meds:    aspirin  81 mg Oral Daily     benztropine  0.5 mg Oral BID     busPIRone HCl  30 mg Oral BID     cloZAPine  250 mg Oral At Bedtime     donepezil  10 mg Oral At Bedtime     gabapentin  100 mg Oral TID w/meals     levothyroxine  88 mcg Oral Daily     megestrol  20 mg Oral Daily     melatonin  5 mg Oral At Bedtime     memantine  10 mg Oral BID     mirtazapine  15 mg Oral At Bedtime     salmeterol  1 puff Inhalation BID     Continuous Infusions:  PRN Meds:.albuterol, alum & mag hydroxide-simethicone, benzocaine-menthol, hydrOXYzine, nicotine, nystatin, polyethylene glycol, polyethylene glycol-propylene glycol PF, senna-docusate    Medication adherence issues: MS Med Adherence Y/N: Yes, Hospitalization  Medication side effects: MEDICATION SIDE EFFECTS: no side effects reported  Benefit: Yes / No: Yes       ROS   A comprehensive review of systems was negative.       MENTAL STATUS EXAM   Vitals: BP (!) 115/90    "Pulse 91   Temp 98.1  F (36.7  C) (Temporal)   Resp 16   Ht 1.778 m (5' 10\")   Wt 77.8 kg (171 lb 8 oz)   SpO2 98%   BMI 24.61 kg/m       Same as last visit  Appearance:  No apparent distress  Mood: \"I am okay\"  Affect: Calm  Suicidal Ideation: Denies  Homicidal Ideation: Denies  Thought process: Disorganized  Thought content: Remains confused and delusional.    Fund of Knowledge: Below average  Attention/Concentration: Poor  Language ability: Significantly impaired  Memory: Global memory impairment  Insight:  Poor.  Judgement: Poor  Orientation: Only to person  Psychomotor Behavior: slowed    Muscle Strength and Tone: MuscleStrength: Normal and Atrophy  Gait and Station: Not evaluated       LABS   personally reviewed.     No results found for: PHENYTOIN, PHENOBARB, VALPROATE, CBMZ       DIAGNOSIS   Principal Problem:    Major neurocognitive disorder, due to multiple etiologies, with behavioral disturbance, severe (H)    Active Problem List:  Patient Active Problem List   Diagnosis     TBI with aggressive behavior     HTN (hypertension)     COPD (chronic obstructive pulmonary disease) (H)     Dementia with behavioral disturbance (H)     Chronic schizophrenia (H)     Smoker     Agitation     Behavior disturbance     Psychosis (H)     Major neurocognitive disorder, due to multiple etiologies, with behavioral disturbance, severe (H)     Paranoid schizophrenia, chronic condition with acute exacerbation (H)     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 Ongoing education given regarding diagnostic and treatment options with risks, benefits and alternatives and adequate verbalization of understanding.  2.  Medications       BuSpar 30 mg 2 times daily       Cogentin 0.5 mg 2 times a day       Clozaril 250 mg at bedtime       Aricept 10 mg at " bedtime       Gabapentin 100 mg 3 times a day       Namenda 10 mg 2 times a day       Remeron 15 mg at bedtime       melatonin 5 mg at bedtime  3.  Medical team following the patient   4.   coordinating a safe discharge plan with the patient.    CBC today is within normal limits.  Risk Assessment: [facility] RISK ASSESSMENT: Patient able to contract for safety    Coordination of Care:   Treatment Plan reviewed and physician signed, Care discussed with Care/Treatment Team Members, Chart reviewed and Patient seen      Re-Certification I certify that the inpatient psychiatric facility services furnished since the previous certification were, and continue to be, medically necessary for, either, treatment which could reasonably be expected to improve the patient s condition or diagnostic study and that the hospital records indicate that the services furnished were, either, intensive treatment services, admission and related services necessary for diagnostic study, or equivalent services.     I certify that the patient continues to need, on a daily basis, active treatment furnished directly by or requiring the supervision of inpatient psychiatric facility personnel.   I estimate 14 days of hospitalization is necessary for proper treatment of the patient. My plans for post-hospital care for this patient are  TBD     Ginger Dubon MD    -     07/25/2022  -     12:57 PM    Total time 15 minutes with > 50%spent on coordination of cares and psycho-education.    This note was created with help of Dragon dictation system. Grammatical / typing errors are not intentional.    Ginger Dubon MD

## 2022-07-25 NOTE — PLAN OF CARE
Patient is withdrawn /isolative into his room. He did not go to groups. Patient declined to come out for dinner, he ate in his room and consumed about 45 % of his food. Mood is calm with a flat affect. He is medication compliant. Speech is soft, he is slow to respond. He denies all MH symptoms. He denies any physical discomfort. Hygiene is poor. Declined HS cares from staff. VSS.

## 2022-07-26 PROCEDURE — 250N000013 HC RX MED GY IP 250 OP 250 PS 637: Performed by: PSYCHIATRY & NEUROLOGY

## 2022-07-26 PROCEDURE — 124N000003 HC R&B MH SENIOR/ADOLESCENT

## 2022-07-26 RX ADMIN — GABAPENTIN 100 MG: 100 CAPSULE ORAL at 12:01

## 2022-07-26 RX ADMIN — ASPIRIN 81 MG: 81 TABLET, COATED ORAL at 08:31

## 2022-07-26 RX ADMIN — CLOZAPINE 250 MG: 100 TABLET ORAL at 20:19

## 2022-07-26 RX ADMIN — MEMANTINE 10 MG: 10 TABLET ORAL at 20:19

## 2022-07-26 RX ADMIN — BENZTROPINE MESYLATE 0.5 MG: 0.5 TABLET ORAL at 08:31

## 2022-07-26 RX ADMIN — GABAPENTIN 100 MG: 100 CAPSULE ORAL at 17:27

## 2022-07-26 RX ADMIN — BENZTROPINE MESYLATE 0.5 MG: 0.5 TABLET ORAL at 20:19

## 2022-07-26 RX ADMIN — BUSPIRONE HYDROCHLORIDE 30 MG: 15 TABLET ORAL at 08:31

## 2022-07-26 RX ADMIN — BUSPIRONE HYDROCHLORIDE 30 MG: 15 TABLET ORAL at 20:19

## 2022-07-26 RX ADMIN — GABAPENTIN 100 MG: 100 CAPSULE ORAL at 08:31

## 2022-07-26 RX ADMIN — DONEPEZIL HYDROCHLORIDE 10 MG: 10 TABLET ORAL at 20:19

## 2022-07-26 RX ADMIN — LEVOTHYROXINE SODIUM 88 MCG: 0.09 TABLET ORAL at 08:31

## 2022-07-26 RX ADMIN — MEGESTROL ACETATE 20 MG: 20 TABLET ORAL at 08:31

## 2022-07-26 RX ADMIN — MIRTAZAPINE 15 MG: 15 TABLET, FILM COATED ORAL at 20:19

## 2022-07-26 RX ADMIN — SALMETEROL XINAFOATE 1 PUFF: 50 POWDER, METERED ORAL; RESPIRATORY (INHALATION) at 20:20

## 2022-07-26 RX ADMIN — SALMETEROL XINAFOATE 1 PUFF: 50 POWDER, METERED ORAL; RESPIRATORY (INHALATION) at 08:32

## 2022-07-26 RX ADMIN — MEMANTINE 10 MG: 10 TABLET ORAL at 08:31

## 2022-07-26 RX ADMIN — Medication 5 MG: at 20:19

## 2022-07-26 ASSESSMENT — ACTIVITIES OF DAILY LIVING (ADL)
ADLS_ACUITY_SCORE: 75

## 2022-07-26 NOTE — PLAN OF CARE
Problem: Cognitive Impairment (Psychotic Signs/Symptoms)  Goal: Optimal Cognitive Function (Psychotic Signs/Symptoms)  Outcome: Ongoing, Not Progressing  Intervention: Support and Promote Cognitive Ability  Recent Flowsheet Documentation  Taken 7/26/2022 1015 by Juarez Murphy RN  Trust Relationship/Rapport:    care explained    choices provided    questions encouraged   Goal Outcome Evaluation:    Plan of Care Reviewed With: patient     Patient is calm, medication compliant. Denies MH concerns but appears hopeless and depressed. Patient is Alert to self only. Did not attend groups or engage with peers. Refused to come out for breakfast, ate less than 25% of meal in room. Ate 25% of lunch in room, per psyche associate patient stood up to go to lunch and was very unsteady almost falling, psyche associate had to catch patient. Patient was given tray in room as legs were very wobbly.

## 2022-07-26 NOTE — PLAN OF CARE
Goal Outcome Evaluation:      Patient slept well the whole night for 10 hours. Nothing unusual noted. Talking to self not observed. No behavioral issues noted.

## 2022-07-26 NOTE — PROGRESS NOTES
Patient remains at his baseline and is psychiatrically stable. He denies any concerns at at this time. Reported feeling tired and denies any other side effects from the medications.     Ginger Dubon MD

## 2022-07-27 LAB
ALBUMIN SERPL-MCNC: 3.1 G/DL (ref 3.4–5)
ALP SERPL-CCNC: 75 U/L (ref 40–150)
ALT SERPL W P-5'-P-CCNC: 92 U/L (ref 0–70)
ANION GAP SERPL CALCULATED.3IONS-SCNC: 5 MMOL/L (ref 3–14)
AST SERPL W P-5'-P-CCNC: 133 U/L (ref 0–45)
BASOPHILS # BLD AUTO: 0.1 10E3/UL (ref 0–0.2)
BASOPHILS NFR BLD AUTO: 1 %
BILIRUB SERPL-MCNC: 0.4 MG/DL (ref 0.2–1.3)
BUN SERPL-MCNC: 20 MG/DL (ref 7–30)
CALCIUM SERPL-MCNC: 8.5 MG/DL (ref 8.5–10.1)
CHLORIDE BLD-SCNC: 118 MMOL/L (ref 94–109)
CO2 SERPL-SCNC: 23 MMOL/L (ref 20–32)
CREAT SERPL-MCNC: 0.7 MG/DL (ref 0.66–1.25)
EOSINOPHIL # BLD AUTO: 1.1 10E3/UL (ref 0–0.7)
EOSINOPHIL NFR BLD AUTO: 19 %
ERYTHROCYTE [DISTWIDTH] IN BLOOD BY AUTOMATED COUNT: 14.3 % (ref 10–15)
GFR SERPL CREATININE-BSD FRML MDRD: >90 ML/MIN/1.73M2
GLUCOSE BLD-MCNC: 164 MG/DL (ref 70–99)
HCT VFR BLD AUTO: 41.5 % (ref 40–53)
HGB BLD-MCNC: 14.5 G/DL (ref 13.3–17.7)
IMM GRANULOCYTES # BLD: 0 10E3/UL
IMM GRANULOCYTES NFR BLD: 1 %
LYMPHOCYTES # BLD AUTO: 1.7 10E3/UL (ref 0.8–5.3)
LYMPHOCYTES NFR BLD AUTO: 30 %
MCH RBC QN AUTO: 32.7 PG (ref 26.5–33)
MCHC RBC AUTO-ENTMCNC: 34.9 G/DL (ref 31.5–36.5)
MCV RBC AUTO: 94 FL (ref 78–100)
MONOCYTES # BLD AUTO: 0.4 10E3/UL (ref 0–1.3)
MONOCYTES NFR BLD AUTO: 8 %
NEUTROPHILS # BLD AUTO: 2.4 10E3/UL (ref 1.6–8.3)
NEUTROPHILS NFR BLD AUTO: 41 %
NRBC # BLD AUTO: 0 10E3/UL
NRBC BLD AUTO-RTO: 0 /100
PLATELET # BLD AUTO: 182 10E3/UL (ref 150–450)
POTASSIUM BLD-SCNC: 3.7 MMOL/L (ref 3.4–5.3)
PROT SERPL-MCNC: 7.2 G/DL (ref 6.8–8.8)
RBC # BLD AUTO: 4.43 10E6/UL (ref 4.4–5.9)
SODIUM SERPL-SCNC: 146 MMOL/L (ref 133–144)
WBC # BLD AUTO: 5.7 10E3/UL (ref 4–11)

## 2022-07-27 PROCEDURE — U0003 INFECTIOUS AGENT DETECTION BY NUCLEIC ACID (DNA OR RNA); SEVERE ACUTE RESPIRATORY SYNDROME CORONAVIRUS 2 (SARS-COV-2) (CORONAVIRUS DISEASE [COVID-19]), AMPLIFIED PROBE TECHNIQUE, MAKING USE OF HIGH THROUGHPUT TECHNOLOGIES AS DESCRIBED BY CMS-2020-01-R: HCPCS | Performed by: PSYCHIATRY & NEUROLOGY

## 2022-07-27 PROCEDURE — 99231 SBSQ HOSP IP/OBS SF/LOW 25: CPT | Performed by: PSYCHIATRY & NEUROLOGY

## 2022-07-27 PROCEDURE — 250N000013 HC RX MED GY IP 250 OP 250 PS 637: Performed by: PSYCHIATRY & NEUROLOGY

## 2022-07-27 PROCEDURE — 124N000003 HC R&B MH SENIOR/ADOLESCENT

## 2022-07-27 PROCEDURE — 85014 HEMATOCRIT: CPT | Performed by: PSYCHIATRY & NEUROLOGY

## 2022-07-27 PROCEDURE — 36415 COLL VENOUS BLD VENIPUNCTURE: CPT | Performed by: PSYCHIATRY & NEUROLOGY

## 2022-07-27 PROCEDURE — 80053 COMPREHEN METABOLIC PANEL: CPT | Performed by: PSYCHIATRY & NEUROLOGY

## 2022-07-27 RX ADMIN — BUSPIRONE HYDROCHLORIDE 30 MG: 15 TABLET ORAL at 19:18

## 2022-07-27 RX ADMIN — MEMANTINE 10 MG: 10 TABLET ORAL at 10:18

## 2022-07-27 RX ADMIN — GABAPENTIN 100 MG: 100 CAPSULE ORAL at 19:18

## 2022-07-27 RX ADMIN — CLOZAPINE 250 MG: 100 TABLET ORAL at 19:17

## 2022-07-27 RX ADMIN — SALMETEROL XINAFOATE 1 PUFF: 50 POWDER, METERED ORAL; RESPIRATORY (INHALATION) at 19:18

## 2022-07-27 RX ADMIN — ASPIRIN 81 MG: 81 TABLET, COATED ORAL at 10:17

## 2022-07-27 RX ADMIN — MEMANTINE 10 MG: 10 TABLET ORAL at 19:18

## 2022-07-27 RX ADMIN — GABAPENTIN 100 MG: 100 CAPSULE ORAL at 10:18

## 2022-07-27 RX ADMIN — LEVOTHYROXINE SODIUM 88 MCG: 0.09 TABLET ORAL at 10:18

## 2022-07-27 RX ADMIN — Medication 5 MG: at 19:18

## 2022-07-27 RX ADMIN — BENZTROPINE MESYLATE 0.5 MG: 0.5 TABLET ORAL at 10:18

## 2022-07-27 RX ADMIN — GABAPENTIN 100 MG: 100 CAPSULE ORAL at 12:39

## 2022-07-27 RX ADMIN — DONEPEZIL HYDROCHLORIDE 10 MG: 10 TABLET ORAL at 19:18

## 2022-07-27 RX ADMIN — BUSPIRONE HYDROCHLORIDE 30 MG: 15 TABLET ORAL at 10:17

## 2022-07-27 RX ADMIN — MEGESTROL ACETATE 20 MG: 20 TABLET ORAL at 10:18

## 2022-07-27 RX ADMIN — MIRTAZAPINE 15 MG: 15 TABLET, FILM COATED ORAL at 19:18

## 2022-07-27 RX ADMIN — BENZTROPINE MESYLATE 0.5 MG: 0.5 TABLET ORAL at 19:18

## 2022-07-27 RX ADMIN — SALMETEROL XINAFOATE 1 PUFF: 50 POWDER, METERED ORAL; RESPIRATORY (INHALATION) at 10:18

## 2022-07-27 ASSESSMENT — ACTIVITIES OF DAILY LIVING (ADL)
ADLS_ACUITY_SCORE: 75

## 2022-07-27 NOTE — PLAN OF CARE
Goal Outcome Evaluation:      Patient slept well the whole night for 10.5 hours. Nothing unusual noted. No concerns raised.

## 2022-07-27 NOTE — PLAN OF CARE
"Goal Outcome Evaluation:    Plan of Care Reviewed With: patient        Pt isolated to room except to come out for dinner. Unsteady initially when arising up from bed but gait steady while walking to dining room. When asked how he was feeling, he stated \"fine.\" Ate 100% of dinner tray. Drank several juice containers. Med-compliant. Hygiene poor. Energy & motivation poor. Impoverished thought and speech. Responds to questions with one word answers. When prompted to go to bathroom at  prior to falling asleep, he declined. Declined assistance with shower. Continue with current treatment plan and recommendations. Continue to monitor and reassess symptoms. Monitor response to medications. Monitor progress towards treatment goals. Encourage groups and participation.         "

## 2022-07-28 LAB — SARS-COV-2 RNA RESP QL NAA+PROBE: NEGATIVE

## 2022-07-28 PROCEDURE — 250N000013 HC RX MED GY IP 250 OP 250 PS 637: Performed by: PSYCHIATRY & NEUROLOGY

## 2022-07-28 PROCEDURE — 99231 SBSQ HOSP IP/OBS SF/LOW 25: CPT | Performed by: PSYCHIATRY & NEUROLOGY

## 2022-07-28 PROCEDURE — 124N000003 HC R&B MH SENIOR/ADOLESCENT

## 2022-07-28 PROCEDURE — 87086 URINE CULTURE/COLONY COUNT: CPT | Performed by: NURSE PRACTITIONER

## 2022-07-28 RX ADMIN — SALMETEROL XINAFOATE 1 PUFF: 50 POWDER, METERED ORAL; RESPIRATORY (INHALATION) at 08:49

## 2022-07-28 RX ADMIN — Medication 5 MG: at 20:26

## 2022-07-28 RX ADMIN — DONEPEZIL HYDROCHLORIDE 10 MG: 10 TABLET ORAL at 20:26

## 2022-07-28 RX ADMIN — SALMETEROL XINAFOATE 1 PUFF: 50 POWDER, METERED ORAL; RESPIRATORY (INHALATION) at 20:28

## 2022-07-28 RX ADMIN — GABAPENTIN 100 MG: 100 CAPSULE ORAL at 08:49

## 2022-07-28 RX ADMIN — BUSPIRONE HYDROCHLORIDE 30 MG: 15 TABLET ORAL at 08:49

## 2022-07-28 RX ADMIN — CLOZAPINE 250 MG: 100 TABLET ORAL at 20:27

## 2022-07-28 RX ADMIN — BENZTROPINE MESYLATE 0.5 MG: 0.5 TABLET ORAL at 08:49

## 2022-07-28 RX ADMIN — ASPIRIN 81 MG: 81 TABLET, COATED ORAL at 08:49

## 2022-07-28 RX ADMIN — BENZTROPINE MESYLATE 0.5 MG: 0.5 TABLET ORAL at 20:27

## 2022-07-28 RX ADMIN — GABAPENTIN 100 MG: 100 CAPSULE ORAL at 19:24

## 2022-07-28 RX ADMIN — MEGESTROL ACETATE 20 MG: 20 TABLET ORAL at 08:49

## 2022-07-28 RX ADMIN — BUSPIRONE HYDROCHLORIDE 30 MG: 15 TABLET ORAL at 20:27

## 2022-07-28 RX ADMIN — GABAPENTIN 100 MG: 100 CAPSULE ORAL at 12:50

## 2022-07-28 RX ADMIN — MEMANTINE 10 MG: 10 TABLET ORAL at 20:27

## 2022-07-28 RX ADMIN — MIRTAZAPINE 15 MG: 15 TABLET, FILM COATED ORAL at 20:26

## 2022-07-28 RX ADMIN — LEVOTHYROXINE SODIUM 88 MCG: 0.09 TABLET ORAL at 08:49

## 2022-07-28 RX ADMIN — MEMANTINE 10 MG: 10 TABLET ORAL at 08:49

## 2022-07-28 ASSESSMENT — ACTIVITIES OF DAILY LIVING (ADL)
ADLS_ACUITY_SCORE: 75

## 2022-07-28 NOTE — PLAN OF CARE
"  Problem: Mobility Impairment  Goal: Optimal Mobility [place] and Safety  Outcome: Ongoing, Progressing      Patient continues to be isolative and withdrawn to his room most of the shift, he didn't come out for group session after encouragement from staff. Patient had no interaction or engagement with peers, affect remained blunted/flat and mood calm/cooperative. Came out for dinner with encouragement from staff, ate about 50% with no nausea or stomach discomfort.   Patient denies pain and discomfort, staff to anticipate patient needs and encourage him to use the bathroom every two hours. Patient voided twice but staff unable to collect urine specimen because he miss the hat in the toilet. No fall noted and require stand by assist with ambulation. Denies SI/SIB/HI, medication compliant and contract for safety.    /81 (BP Location: Right arm, Patient Position: Sitting)   Pulse 96   Temp 97.7  F (36.5  C) (Temporal)   Resp 16   Ht 1.778 m (5' 10\")   Wt 78 kg (172 lb)   SpO2 94%   BMI 24.68 kg/m                           "

## 2022-07-28 NOTE — PROGRESS NOTES
PSYCHIATRY  PROGRESS NOTE     DATE OF SERVICE   07/27/2022       CHIEF COMPLAINT   Patient was admitted due to inability to safely care for himself and psychosis.       SUBJECTIVE   Nursing reports:   P patient has been refusing to have breakfast and he is refusing to come out of his room.  Atient declining food and fluids since yesterday in the afternoon.    Patient has been discussed in detail with the  during team meeting.   informed me that we still have not received any updates from the guardianship process.   has continue to follow up on referrals for nursing homes.     OBJECTIVE   Patient was seen and evaluated at bedside by himself, this was a face-to-face evaluation.  Patient only said that he was feeling tired and he refused to discuss anything else.  He remains at his baseline at this time and continues to be delusional.  As per nursing staff patient did came out to eat later on for lunch.    Medical consult has been placed.     MEDICATIONS   Medications:  Scheduled Meds:    aspirin  81 mg Oral Daily     benztropine  0.5 mg Oral BID     busPIRone HCl  30 mg Oral BID     cloZAPine  250 mg Oral At Bedtime     donepezil  10 mg Oral At Bedtime     gabapentin  100 mg Oral TID w/meals     levothyroxine  88 mcg Oral Daily     megestrol  20 mg Oral Daily     melatonin  5 mg Oral At Bedtime     memantine  10 mg Oral BID     mirtazapine  15 mg Oral At Bedtime     salmeterol  1 puff Inhalation BID     Continuous Infusions:  PRN Meds:.albuterol, alum & mag hydroxide-simethicone, benzocaine-menthol, hydrOXYzine, nicotine, nystatin, polyethylene glycol, polyethylene glycol-propylene glycol PF, senna-docusate    Medication adherence issues: MS Med Adherence Y/N: Yes, Hospitalization  Medication side effects: MEDICATION SIDE EFFECTS: no side effects reported  Benefit: Yes / No: Yes       ROS   A comprehensive review of systems was negative.       MENTAL STATUS EXAM   Vitals: BP  "115/78 (BP Location: Right arm, Patient Position: Supine, Cuff Size: Adult Regular)   Pulse 90   Temp 97.9  F (36.6  C) (Temporal)   Resp 16   Ht 1.778 m (5' 10\")   Wt 77.8 kg (171 lb 8 oz)   SpO2 98%   BMI 24.61 kg/m       Appearance:  No apparent distress  Mood: \"I am tired\"  Affect: Calm  Suicidal Ideation: Denies  Homicidal Ideation: Denies  Thought process: Disorganized  Thought content: Remains confused and delusional.    Fund of Knowledge: Below average  Attention/Concentration: Poor  Language ability: Significantly impaired  Memory: Global memory impairment  Insight:  Poor.  Judgement: Poor  Orientation: Only to person  Psychomotor Behavior: slowed    Muscle Strength and Tone: MuscleStrength: Normal and Atrophy  Gait and Station: Not evaluated       LABS   personally reviewed.     No results found for: PHENYTOIN, PHENOBARB, VALPROATE, CBMZ       DIAGNOSIS   Principal Problem:    Major neurocognitive disorder, due to multiple etiologies, with behavioral disturbance, severe (H)    Active Problem List:  Patient Active Problem List   Diagnosis     TBI with aggressive behavior     HTN (hypertension)     COPD (chronic obstructive pulmonary disease) (H)     Dementia with behavioral disturbance (H)     Chronic schizophrenia (H)     Smoker     Agitation     Behavior disturbance     Psychosis (H)     Major neurocognitive disorder, due to multiple etiologies, with behavioral disturbance, severe (H)     Paranoid schizophrenia, chronic condition with acute exacerbation (H)     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 Ongoing education given regarding diagnostic and treatment options with risks, benefits and alternatives and adequate verbalization of understanding.  2.  Medications       BuSpar 30 mg 2 times daily       Cogentin 0.5 mg 2 times " a day       Clozaril 250 mg at bedtime       Aricept 10 mg at bedtime       Gabapentin 100 mg 3 times a day       Namenda 10 mg 2 times a day       Remeron 15 mg at bedtime       melatonin 5 mg at bedtime  3.  Medical team following the patient   4.   coordinating a safe discharge plan with the patient.    CBC today is within normal limits.  Risk Assessment: [facility] RISK ASSESSMENT: Patient able to contract for safety    Coordination of Care:   Treatment Plan reviewed and physician signed, Care discussed with Care/Treatment Team Members, Chart reviewed and Patient seen      Re-Certification I certify that the inpatient psychiatric facility services furnished since the previous certification were, and continue to be, medically necessary for, either, treatment which could reasonably be expected to improve the patient s condition or diagnostic study and that the hospital records indicate that the services furnished were, either, intensive treatment services, admission and related services necessary for diagnostic study, or equivalent services.     I certify that the patient continues to need, on a daily basis, active treatment furnished directly by or requiring the supervision of inpatient psychiatric facility personnel.   I estimate 14 days of hospitalization is necessary for proper treatment of the patient. My plans for post-hospital care for this patient are  TBD     Ginger Dubon MD    -     07/27/2022  -     10:35 PM    Total time 15 minutes with > 50%spent on coordination of cares and psycho-education.    This note was created with help of Dragon dictation system. Grammatical / typing errors are not intentional.    Ginger Dubon MD

## 2022-07-28 NOTE — PROGRESS NOTES
"PSYCHIATRY  PROGRESS NOTE     DATE OF SERVICE   07/28/2022       CHIEF COMPLAINT   Patient was admitted due to inability to safely care for himself and psychosis.       SUBJECTIVE   Nursing reports:   Plan of Care Reviewed With: patient      Patient is calm and medication compliant, isolative and withdrawn. Refused to come to milieu for breakfast, stating \"I can't make it\" after attempting to walk from bed to door. Ate 50% of tray in room. Patient was agreeable during lunch and came out of room, ate 50% of lunch. Patient declined ADLs and refused to participate in unit activities.   LABS ordered:   CBC, CMP, UA     PM:  Patient is calm, medication compliant. Isolative and withdrawn, came out for dinner and ate 75%. Not social with peers. Declined shower, or assistance with ADLs. Patient needs UA, writer explained to patient and put a specimen collector pan in toilet. Writer found empty clean specimen collector on floor and urine in toilet. Will attempt to get urine sample on demand. See result details from labs ordered this morning.         Patient still needs UA  Internal Medicine consult placed d/t elevated sodium and poor oral intake.  Covid result pending    Patient has been discussed in detail with the  during team meeting.  Again we have not received any updates on the guardianship.     OBJECTIVE   Patient was seen and evaluated at bedside by himself, this was a face-to-face evaluation.  Patient stated that he is feeling tired and that he does not want to leave his room.  Patient did admitted feeling hungry and ask if his food can be brought to his room.  He continues to be at his baseline and is denying all psychiatric symptoms.  Patient remains confused, pleasant, delusional and at times hallucinating.      Patient has been discussed with the medical team.  They will try to evaluate the patient today and decide if he needs IV fluids.     MEDICATIONS   Medications:  Scheduled Meds:    aspirin  81 " "mg Oral Daily     benztropine  0.5 mg Oral BID     busPIRone HCl  30 mg Oral BID     cloZAPine  250 mg Oral At Bedtime     donepezil  10 mg Oral At Bedtime     gabapentin  100 mg Oral TID w/meals     levothyroxine  88 mcg Oral Daily     megestrol  20 mg Oral Daily     melatonin  5 mg Oral At Bedtime     memantine  10 mg Oral BID     mirtazapine  15 mg Oral At Bedtime     salmeterol  1 puff Inhalation BID     Continuous Infusions:  PRN Meds:.albuterol, alum & mag hydroxide-simethicone, benzocaine-menthol, hydrOXYzine, nicotine, nystatin, polyethylene glycol, polyethylene glycol-propylene glycol PF, senna-docusate    Medication adherence issues: MS Med Adherence Y/N: Yes, Hospitalization  Medication side effects: MEDICATION SIDE EFFECTS: no side effects reported  Benefit: Yes / No: Yes       ROS   A comprehensive review of systems was negative.       MENTAL STATUS EXAM   Vitals: BP 96/64   Pulse 81   Temp 97.5  F (36.4  C) (Temporal)   Resp 16   Ht 1.778 m (5' 10\")   Wt 78 kg (172 lb)   SpO2 97%   BMI 24.68 kg/m       Same as last visit  Appearance:  No apparent distress  Mood: \"I am tired\"  Affect: Calm  Suicidal Ideation: Denies  Homicidal Ideation: Denies  Thought process: Disorganized  Thought content: Remains confused and delusional.    Fund of Knowledge: Below average  Attention/Concentration: Poor  Language ability: Significantly impaired  Memory: Global memory impairment  Insight:  Poor.  Judgement: Poor  Orientation: Only to person  Psychomotor Behavior: slowed    Muscle Strength and Tone: MuscleStrength: Normal and Atrophy  Gait and Station: Not evaluated       LABS   personally reviewed.     No results found for: PHENYTOIN, PHENOBARB, VALPROATE, CBMZ       DIAGNOSIS   Principal Problem:    Major neurocognitive disorder, due to multiple etiologies, with behavioral disturbance, severe (H)    Active Problem List:  Patient Active Problem List   Diagnosis     TBI with aggressive behavior     HTN " (hypertension)     COPD (chronic obstructive pulmonary disease) (H)     Dementia with behavioral disturbance (H)     Chronic schizophrenia (H)     Smoker     Agitation     Behavior disturbance     Psychosis (H)     Major neurocognitive disorder, due to multiple etiologies, with behavioral disturbance, severe (H)     Paranoid schizophrenia, chronic condition with acute exacerbation (H)     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 Ongoing education given regarding diagnostic and treatment options with risks, benefits and alternatives and adequate verbalization of understanding.  2.  Medications       BuSpar 30 mg 2 times daily       Cogentin 0.5 mg 2 times a day       Clozaril 250 mg at bedtime       Aricept 10 mg at bedtime       Gabapentin 100 mg 3 times a day       Namenda 10 mg 2 times a day       Remeron 15 mg at bedtime       melatonin 5 mg at bedtime  3.  Medical team following the patient   4.   coordinating a safe discharge plan with the patient.    CBC today is within normal limits.  Risk Assessment: [facility] RISK ASSESSMENT: Patient able to contract for safety    Coordination of Care:   Treatment Plan reviewed and physician signed, Care discussed with Care/Treatment Team Members, Chart reviewed and Patient seen      Re-Certification I certify that the inpatient psychiatric facility services furnished since the previous certification were, and continue to be, medically necessary for, either, treatment which could reasonably be expected to improve the patient s condition or diagnostic study and that the hospital records indicate that the services furnished were, either, intensive treatment services, admission and related services necessary for diagnostic study, or equivalent services.     I certify that the patient continues  to need, on a daily basis, active treatment furnished directly by or requiring the supervision of inpatient psychiatric facility personnel.   I estimate 14 days of hospitalization is necessary for proper treatment of the patient. My plans for post-hospital care for this patient are  TBD     Ginger Dubon MD    -     07/28/2022  -     2:39 PM    Total time 15 minutes with > 50%spent on coordination of cares and psycho-education.    This note was created with help of Dragon dictation system. Grammatical / typing errors are not intentional.    Ginger Dubon MD

## 2022-07-28 NOTE — PROGRESS NOTES
IM notified of Lab results from yesterday. Sodium 146  Encouraged fluids, will continue to document progress.

## 2022-07-28 NOTE — PROGRESS NOTES
Brief Medicine Note  Hypernatremia  Hypotension, mild  Patient with poor oral intake. BMP with hypernatremia, but good kidney function.  Both hypotension and hypernatremia likely due to this. Attempted to interview patient multiple times, but he was always sleeping heavily in room. Nursing staff reports they have been successful with encouraging fluid intake today.   - Continue to encourage oral intake  - Can hold off on IV fluids for now, but in the event the patient becomes unstable please notify on-call SHREE for fluid bolus and repeat labs this evening  - Repeat BMP tomorrow AM    Medicine will continue to follow up on BMP peripherally. Thank you for allowing us to be a part of this patient's care. Please notify on call SHREE if any intercurrent medical issues arise.     Laura Lowry PA-C  Hospitalist Service

## 2022-07-28 NOTE — PROGRESS NOTES
Pt appeared to be sleeping a total of about 9.25 hours. No concerns during the night.  F/U recommendation: UA collection of sample pending.

## 2022-07-28 NOTE — PLAN OF CARE
"  Problem: Behavioral Health Plan of Care  Goal: Adheres to Safety Considerations for Self and Others  Intervention: Develop and Maintain Individualized Safety Plan  Recent Flowsheet Documentation  Taken 7/28/2022 1400 by Wen Broderick RN  Safety Measures:   environmental rounds completed   safety rounds completed   suicide check-in completed   Goal Outcome Evaluation:    Plan of Care Reviewed With: patient      Nursing Assessment    Psychosis (H) [F29]    Admit Date: 1/26/2022    Length of Stay: 183    Patient evaluation continues. Assessed mood,anxiety,thoughts and behavior. Patient is progressing towards goals. No behavior problems such as aggression.Patient is encouraged to participate in groups and assisted to develop healthy coping skills.  Patient admits to  auditory  hallucinations. BP 96/64   Pulse 81   Temp 97.5  F (36.4  C) (Temporal)   Resp 16   Ht 1.778 m (5' 10\")   Wt 78 kg (172 lb)   SpO2 97%   BMI 24.68 kg/m      Mood: good    Patient reports depression none and reports anxiety none    Affect:bright    Sleep: 9 hours last night    Appetite: 25 % drank 900 ml fluid    SI: denies    HI: denies    SIB: denies      Medication Compliance yes    Group participation: bingo only    ADL's: assist of staff    Fall risk interventions: proper foot wear, orthostatic B/p, standby assist    Rinku Score Interventions:none    Discharge planning in process    Refer to daily team meeting notes for individualized plan of care. Nursing will continue to assess.    *Scale is 1-10 and 10 is the worst.                  "

## 2022-07-29 LAB
ANION GAP SERPL CALCULATED.3IONS-SCNC: 9 MMOL/L (ref 3–14)
BACTERIA UR CULT: NO GROWTH
BASOPHILS # BLD AUTO: 0.1 10E3/UL (ref 0–0.2)
BASOPHILS NFR BLD AUTO: 1 %
BUN SERPL-MCNC: 18 MG/DL (ref 7–30)
CALCIUM SERPL-MCNC: 9.2 MG/DL (ref 8.5–10.1)
CHLORIDE BLD-SCNC: 111 MMOL/L (ref 94–109)
CO2 SERPL-SCNC: 21 MMOL/L (ref 20–32)
CREAT SERPL-MCNC: 0.62 MG/DL (ref 0.66–1.25)
EOSINOPHIL # BLD AUTO: 1.2 10E3/UL (ref 0–0.7)
EOSINOPHIL NFR BLD AUTO: 12 %
ERYTHROCYTE [DISTWIDTH] IN BLOOD BY AUTOMATED COUNT: 14 % (ref 10–15)
GFR SERPL CREATININE-BSD FRML MDRD: >90 ML/MIN/1.73M2
GLUCOSE BLD-MCNC: 107 MG/DL (ref 70–99)
HCT VFR BLD AUTO: 42.2 % (ref 40–53)
HGB BLD-MCNC: 14.7 G/DL (ref 13.3–17.7)
IMM GRANULOCYTES # BLD: 0 10E3/UL
IMM GRANULOCYTES NFR BLD: 0 %
LYMPHOCYTES # BLD AUTO: 1.3 10E3/UL (ref 0.8–5.3)
LYMPHOCYTES NFR BLD AUTO: 13 %
MCH RBC QN AUTO: 32.5 PG (ref 26.5–33)
MCHC RBC AUTO-ENTMCNC: 34.8 G/DL (ref 31.5–36.5)
MCV RBC AUTO: 93 FL (ref 78–100)
MONOCYTES # BLD AUTO: 0.6 10E3/UL (ref 0–1.3)
MONOCYTES NFR BLD AUTO: 6 %
NEUTROPHILS # BLD AUTO: 6.6 10E3/UL (ref 1.6–8.3)
NEUTROPHILS NFR BLD AUTO: 68 %
NRBC # BLD AUTO: 0 10E3/UL
NRBC BLD AUTO-RTO: 0 /100
PLATELET # BLD AUTO: 160 10E3/UL (ref 150–450)
POTASSIUM BLD-SCNC: 3.9 MMOL/L (ref 3.4–5.3)
RBC # BLD AUTO: 4.52 10E6/UL (ref 4.4–5.9)
SODIUM SERPL-SCNC: 141 MMOL/L (ref 133–144)
WBC # BLD AUTO: 9.8 10E3/UL (ref 4–11)

## 2022-07-29 PROCEDURE — 250N000013 HC RX MED GY IP 250 OP 250 PS 637: Performed by: PSYCHIATRY & NEUROLOGY

## 2022-07-29 PROCEDURE — 99232 SBSQ HOSP IP/OBS MODERATE 35: CPT | Performed by: PSYCHIATRY & NEUROLOGY

## 2022-07-29 PROCEDURE — 124N000003 HC R&B MH SENIOR/ADOLESCENT

## 2022-07-29 PROCEDURE — 85025 COMPLETE CBC W/AUTO DIFF WBC: CPT | Performed by: PSYCHIATRY & NEUROLOGY

## 2022-07-29 PROCEDURE — 82310 ASSAY OF CALCIUM: CPT

## 2022-07-29 PROCEDURE — 36415 COLL VENOUS BLD VENIPUNCTURE: CPT | Performed by: PSYCHIATRY & NEUROLOGY

## 2022-07-29 RX ADMIN — Medication 5 MG: at 19:44

## 2022-07-29 RX ADMIN — SALMETEROL XINAFOATE 1 PUFF: 50 POWDER, METERED ORAL; RESPIRATORY (INHALATION) at 19:45

## 2022-07-29 RX ADMIN — MIRTAZAPINE 15 MG: 15 TABLET, FILM COATED ORAL at 19:44

## 2022-07-29 RX ADMIN — GABAPENTIN 100 MG: 100 CAPSULE ORAL at 13:54

## 2022-07-29 RX ADMIN — BUSPIRONE HYDROCHLORIDE 30 MG: 15 TABLET ORAL at 08:11

## 2022-07-29 RX ADMIN — GABAPENTIN 100 MG: 100 CAPSULE ORAL at 18:06

## 2022-07-29 RX ADMIN — BENZTROPINE MESYLATE 0.5 MG: 0.5 TABLET ORAL at 19:43

## 2022-07-29 RX ADMIN — BENZTROPINE MESYLATE 0.5 MG: 0.5 TABLET ORAL at 08:11

## 2022-07-29 RX ADMIN — DONEPEZIL HYDROCHLORIDE 10 MG: 10 TABLET ORAL at 19:43

## 2022-07-29 RX ADMIN — MEMANTINE 10 MG: 10 TABLET ORAL at 08:11

## 2022-07-29 RX ADMIN — MEGESTROL ACETATE 20 MG: 20 TABLET ORAL at 08:11

## 2022-07-29 RX ADMIN — GABAPENTIN 100 MG: 100 CAPSULE ORAL at 08:11

## 2022-07-29 RX ADMIN — BUSPIRONE HYDROCHLORIDE 30 MG: 15 TABLET ORAL at 19:43

## 2022-07-29 RX ADMIN — CLOZAPINE 250 MG: 100 TABLET ORAL at 19:43

## 2022-07-29 RX ADMIN — ASPIRIN 81 MG: 81 TABLET, COATED ORAL at 08:11

## 2022-07-29 RX ADMIN — LEVOTHYROXINE SODIUM 88 MCG: 0.09 TABLET ORAL at 08:11

## 2022-07-29 RX ADMIN — SALMETEROL XINAFOATE 1 PUFF: 50 POWDER, METERED ORAL; RESPIRATORY (INHALATION) at 08:12

## 2022-07-29 RX ADMIN — MEMANTINE 10 MG: 10 TABLET ORAL at 19:44

## 2022-07-29 ASSESSMENT — ACTIVITIES OF DAILY LIVING (ADL)
ADLS_ACUITY_SCORE: 74
ADLS_ACUITY_SCORE: 75
ADLS_ACUITY_SCORE: 75
ADLS_ACUITY_SCORE: 74
ADLS_ACUITY_SCORE: 75
ADLS_ACUITY_SCORE: 74
ADLS_ACUITY_SCORE: 75

## 2022-07-29 NOTE — PLAN OF CARE
"  Problem: Behavioral Health Plan of Care  Goal: Plan of Care Review  Recent Flowsheet Documentation  Taken 7/29/2022 0830 by Wen Broderick RN  Plan of Care Reviewed With: patient  Patient Agreement with Plan of Care: unable to participate   Goal Outcome Evaluation:    Plan of Care Reviewed With: patient      Nursing Assessment    Psychosis (H) [F29]    Admit Date: 1/26/2022    Length of Stay: 184    Patient evaluation continues. Assessed mood,anxiety,thoughts and behavior. Patient is  progressing towards goals. Patient is encouraged to participate in groups and assisted to develop healthy coping skills.  Patient admits auditory hallucinations talking to his sister. /78   Pulse 92   Temp 98.7  F (37.1  C) (Temporal)   Resp 16   Ht 1.778 m (5' 10\")   Wt 78 kg (172 lb)   SpO2 98%   BMI 24.68 kg/m      Mood: \"ok\".    Patient reports depression none and smiles and reports anxiety none    Affect:flat,blunted but smiles at time upon approach    Sleep:10 hours     Appetite: good    SI: denies    HI: denies    SIB: denies      Medication Compliance : yes    Group participation: no    ADL's: assist by staff    Fall risk interventions: proper foot wear, orthostatic B/P standby assist    Rinku Score Interventions:none    Discharge planning in process    Refer to daily team meeting notes for individualized plan of care. Nursing will continue to assess.    *Scale is 1-10 and 10 is the worst.                  "

## 2022-07-29 NOTE — PROGRESS NOTES
"PSYCHIATRY  PROGRESS NOTE     DATE OF SERVICE   07/29/2022       CHIEF COMPLAINT   Patient was admitted due to inability to safely care for himself and psychosis.       SUBJECTIVE   Nursing reports:   Patient continues to be isolative and withdrawn to his room most of the shift, he didn't come out for group session after encouragement from staff. Patient had no interaction or engagement with peers, affect remained blunted/flat and mood calm/cooperative. Came out for dinner with encouragement from staff, ate about 50% with no nausea or stomach discomfort.   Patient denies pain and discomfort, staff to anticipate patient needs and encourage him to use the bathroom every two hours. Patient voided twice but staff unable to collect urine specimen because he miss the hat in the toilet. No fall noted and require stand by assist with ambulation. Denies SI/SIB/HI, medication compliant and contract for safety.     /81 (BP Location: Right arm, Patient Position: Sitting)   Pulse 96   Temp 97.7  F (36.5  C) (Temporal)   Resp 16   Ht 1.778 m (5' 10\")   Wt 78 kg (172 lb)   SpO2 94%   BMI 24.68 kg/m      Patient has been discussed in detail with the  during team meeting.  Again we have not received any updates on the guardianship.     OBJECTIVE   Patient was seen and evaluated at bedside by himself, this was a face-to-face evaluation.  Today the patient presented as bright, pleasantly confused and cooperative with the assessment.  Patient asked this writer to help him open and apple juice and he drinks 2 of them.  Patient was happy thinking that he has a lot of money and multiple mansions that he can be discharged to.  Patient said that he is not hearing voices but I observed the patient constantly talking to himself.  Patient does not want to leave his room and said that he is feeling \"all right\".  [facility] and the year is [facility]. Denies any thoughts of harming " "himself and has been able to contract for safety.     MEDICATIONS   Medications:  Scheduled Meds:    aspirin  81 mg Oral Daily     benztropine  0.5 mg Oral BID     busPIRone HCl  30 mg Oral BID     cloZAPine  250 mg Oral At Bedtime     donepezil  10 mg Oral At Bedtime     gabapentin  100 mg Oral TID w/meals     levothyroxine  88 mcg Oral Daily     megestrol  20 mg Oral Daily     melatonin  5 mg Oral At Bedtime     memantine  10 mg Oral BID     mirtazapine  15 mg Oral At Bedtime     salmeterol  1 puff Inhalation BID     Continuous Infusions:  PRN Meds:.albuterol, alum & mag hydroxide-simethicone, benzocaine-menthol, hydrOXYzine, nicotine, nystatin, polyethylene glycol, polyethylene glycol-propylene glycol PF, senna-docusate    Medication adherence issues: MS Med Adherence Y/N: Yes, Hospitalization  Medication side effects: MEDICATION SIDE EFFECTS: no side effects reported  Benefit: Yes / No: Yes       ROS   A comprehensive review of systems was negative.       MENTAL STATUS EXAM   Vitals: /78   Pulse 92   Temp 98.7  F (37.1  C) (Temporal)   Resp 16   Ht 1.778 m (5' 10\")   Wt 78 kg (172 lb)   SpO2 96%   BMI 24.68 kg/m       Appearance:  No apparent distress  Mood: \"Feeling all right\"  Affect: Calm, pleasant and bright  Suicidal Ideation: Denies  Homicidal Ideation: Denies  Thought process: Disorganized  Thought content: Remains confused and delusional and hallucinating.    Fund of Knowledge: Below average  Attention/Concentration: Poor  Language ability: Significantly impaired  Memory: Global memory impairment  Insight:  Poor.  Judgement: Poor  Orientation: Only to person  Psychomotor Behavior: slowed    Muscle Strength and Tone: MuscleStrength: Normal and Atrophy  Gait and Station: Not evaluated       LABS   personally reviewed.     No results found for: PHENYTOIN, PHENOBARB, VALPROATE, CBMZ       DIAGNOSIS   Principal Problem:    Major neurocognitive disorder, due to multiple etiologies, with behavioral " disturbance, severe (H)    Active Problem List:  Patient Active Problem List   Diagnosis     TBI with aggressive behavior     HTN (hypertension)     COPD (chronic obstructive pulmonary disease) (H)     Dementia with behavioral disturbance (H)     Chronic schizophrenia (H)     Smoker     Agitation     Behavior disturbance     Psychosis (H)     Major neurocognitive disorder, due to multiple etiologies, with behavioral disturbance, severe (H)     Paranoid schizophrenia, chronic condition with acute exacerbation (H)     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 Ongoing education given regarding diagnostic and treatment options with risks, benefits and alternatives and adequate verbalization of understanding.  2.  Medications       BuSpar 30 mg 2 times daily       Cogentin 0.5 mg 2 times a day       Clozaril 250 mg at bedtime       Aricept 10 mg at bedtime       Gabapentin 100 mg 3 times a day       Namenda 10 mg 2 times a day       Remeron 15 mg at bedtime       melatonin 5 mg at bedtime  3.  Medical team following the patient   4.   coordinating a safe discharge plan with the patient.    CBC today is within normal limits.  Risk Assessment: [facility] RISK ASSESSMENT: Patient able to contract for safety    Coordination of Care:   Treatment Plan reviewed and physician signed, Care discussed with Care/Treatment Team Members, Chart reviewed and Patient seen      Re-Certification I certify that the inpatient psychiatric facility services furnished since the previous certification were, and continue to be, medically necessary for, either, treatment which could reasonably be expected to improve the patient s condition or diagnostic study and that the hospital records indicate that the services furnished were, either, intensive treatment  services, admission and related services necessary for diagnostic study, or equivalent services.     I certify that the patient continues to need, on a daily basis, active treatment furnished directly by or requiring the supervision of inpatient psychiatric facility personnel.   I estimate 14 days of hospitalization is necessary for proper treatment of the patient. My plans for post-hospital care for this patient are  TBD     Ginger Dubon MD    -     07/29/2022  -     12:54 PM    Total time 25 minutes with > 50%spent on coordination of cares and psycho-education.    This note was created with help of Dragon dictation system. Grammatical / typing errors are not intentional.    Ginger Dubon MD

## 2022-07-29 NOTE — PROGRESS NOTES
Brief Medicine Note  Hypernatremia, improved  Hypotension, mild, improved  Patient with a few days of poor oral intake. BMP with hypernatremia, but good kidney function. Both hypotension and hypernatremia were likely due to this. Nursing staff reports were successful with encouraging fluid intake and repeat BMP shows improvement.  - Continue to encourage oral intake   - Per nursing staff, must hand patient drinks to ensure he drinks     Medicine will sign off now. Thank you for allowing us to be a part of this patient's care. Please notify on call SHREE if any intercurrent medical issues arise.      Laura Lowry PA-C  Hospitalist Service

## 2022-07-29 NOTE — PLAN OF CARE
Problem: Sleep Disturbance (Psychotic Signs/Symptoms)  Goal: Improved Sleep (Psychotic Signs/Symptoms)  7/29/2022 0548 by Ion Corral RN  Outcome: Ongoing, Progressing    The patient was in bed at the beginning of the shift. The patient's respiration was even and unlabored. Safety checks were performed every 15 minutes with no incidence. Will continue to monitor.

## 2022-07-30 PROCEDURE — 250N000013 HC RX MED GY IP 250 OP 250 PS 637: Performed by: PSYCHIATRY & NEUROLOGY

## 2022-07-30 PROCEDURE — 124N000003 HC R&B MH SENIOR/ADOLESCENT

## 2022-07-30 RX ADMIN — BUSPIRONE HYDROCHLORIDE 30 MG: 15 TABLET ORAL at 19:36

## 2022-07-30 RX ADMIN — Medication 5 MG: at 19:37

## 2022-07-30 RX ADMIN — MEMANTINE 10 MG: 10 TABLET ORAL at 08:49

## 2022-07-30 RX ADMIN — ASPIRIN 81 MG: 81 TABLET, COATED ORAL at 08:49

## 2022-07-30 RX ADMIN — MEGESTROL ACETATE 20 MG: 20 TABLET ORAL at 08:49

## 2022-07-30 RX ADMIN — LEVOTHYROXINE SODIUM 88 MCG: 0.09 TABLET ORAL at 08:49

## 2022-07-30 RX ADMIN — BENZTROPINE MESYLATE 0.5 MG: 0.5 TABLET ORAL at 19:37

## 2022-07-30 RX ADMIN — GABAPENTIN 100 MG: 100 CAPSULE ORAL at 17:39

## 2022-07-30 RX ADMIN — GABAPENTIN 100 MG: 100 CAPSULE ORAL at 08:49

## 2022-07-30 RX ADMIN — CLOZAPINE 250 MG: 100 TABLET ORAL at 19:37

## 2022-07-30 RX ADMIN — MIRTAZAPINE 15 MG: 15 TABLET, FILM COATED ORAL at 19:39

## 2022-07-30 RX ADMIN — BENZTROPINE MESYLATE 0.5 MG: 0.5 TABLET ORAL at 08:49

## 2022-07-30 RX ADMIN — MEMANTINE 10 MG: 10 TABLET ORAL at 19:37

## 2022-07-30 RX ADMIN — DONEPEZIL HYDROCHLORIDE 10 MG: 10 TABLET ORAL at 19:37

## 2022-07-30 RX ADMIN — SALMETEROL XINAFOATE 1 PUFF: 50 POWDER, METERED ORAL; RESPIRATORY (INHALATION) at 19:40

## 2022-07-30 RX ADMIN — BUSPIRONE HYDROCHLORIDE 30 MG: 15 TABLET ORAL at 08:49

## 2022-07-30 RX ADMIN — GABAPENTIN 100 MG: 100 CAPSULE ORAL at 12:46

## 2022-07-30 RX ADMIN — SALMETEROL XINAFOATE 1 PUFF: 50 POWDER, METERED ORAL; RESPIRATORY (INHALATION) at 08:52

## 2022-07-30 ASSESSMENT — ACTIVITIES OF DAILY LIVING (ADL)
ADLS_ACUITY_SCORE: 74
ADLS_ACUITY_SCORE: 70
ADLS_ACUITY_SCORE: 74
ADLS_ACUITY_SCORE: 70
ADLS_ACUITY_SCORE: 74
ADLS_ACUITY_SCORE: 70
ADLS_ACUITY_SCORE: 74

## 2022-07-30 NOTE — PLAN OF CARE
Goal Outcome Evaluation:      Patient slept well the whole night for 10.5 hours. Nothing unusual noted. No GI complaints made. No concerns raised.

## 2022-07-30 NOTE — PLAN OF CARE
Problem: Mood Impairment (Psychotic Signs/Symptoms)  Goal: Improved Mood Symptoms (Psychotic Signs/Symptoms)  Outcome: Ongoing, Progressing  Intervention: Optimize Emotion and Mood  Recent Flowsheet Documentation  Taken 7/29/2022 1700 by Naveed Hopper RN  Diversional Activity: television     Problem: Social, Occupational or Functional Impairment (Psychotic Signs/Symptoms)  Goal: Enhanced Social, Occupational or Functional Skills (Psychotic Signs/Symptoms)  Outcome: Ongoing, Progressing  Intervention: Promote Social, Occupational and Functional Ability  Recent Flowsheet Documentation  Taken 7/29/2022 1700 by Naveed Hopper RN  Trust Relationship/Rapport:    emotional support provided    empathic listening provided    Patient was mostly withdrawn and isolated to room and in bed through the shift. He was out for dinner only and ate about 10% . He refused to answer assessment questions and became irritable, agitated, and aggressive as the writer re-approached him. Mood/affect was tense, anxious, and restless, but he was nevertheless compliant with medications. Patient encouraged and assisted to the bathroom every 2 hrs.  Refused vitals @ PM and HS.  Will continue to monitor and encourage.

## 2022-07-30 NOTE — PLAN OF CARE
"  Problem: Mood Impairment (Psychotic Signs/Symptoms)  Goal: Improved Mood Symptoms (Psychotic Signs/Symptoms)  Outcome: Ongoing, Progressing  Intervention: Optimize Emotion and Mood  Recent Flowsheet Documentation  Taken 7/30/2022 1700 by Naveed Hopper RN  Diversional Activity: (sleeping) other (see comments)     Problem: Depression  Goal: Improved Mood  Outcome: Ongoing, Progressing  Intervention: Monitor and Manage Depressive Symptoms  Recent Flowsheet Documentation  Taken 7/30/2022 1700 by Naveed Hopper RN  Complementary Therapy: (music,refused) other (see comments)  Family/Support System Care:    presence promoted    self-care encouraged     Patient continue to isolate self. Stays in room and in bed through the shift.  Irritable when approach or assess. Refuses to answer assessment questions. Tells the writer to leave his room. Refuses to eat dinner or snacks, but nevertheless is compliant with medications. His vitals are  somewhat stable BP 91/62 (BP Location: Right arm, Patient Position: Left side, Cuff Size: Adult Small)   Pulse 90   Temp 98.2  F (36.8  C) (Temporal)   Resp 16   Ht 1.778 m (5' 10\")   Wt 78 kg (172 lb)   SpO2 95%   BMI 24.68 kg/m  .Remains very slow  in responding to basic questions. Speech is unclear and patient is unkempt. Appetite is poor. Eats about 10% dinner. Will continue to monitor and encourage            "

## 2022-07-30 NOTE — PLAN OF CARE
Problem: Decreased Participation and Engagement (Psychotic Signs/Symptoms)  Goal: Increased Participation and Engagement (Psychotic Signs/Symptoms)  Outcome: Ongoing, Not Progressing    Patient continues to be withdrawn and isolative. He came out for breakfast and ate 100% then went back to his room. He refused to come out for lunch; staff did assist him with meal; he ate about 30%.     He has been guarded, tense and irritable refusing to answer assessment questions.     Patient has been medication compliant; denied any medication side effects. We'll continue to monitor.

## 2022-07-31 PROCEDURE — 250N000013 HC RX MED GY IP 250 OP 250 PS 637: Performed by: PSYCHIATRY & NEUROLOGY

## 2022-07-31 PROCEDURE — 124N000003 HC R&B MH SENIOR/ADOLESCENT

## 2022-07-31 RX ADMIN — GABAPENTIN 100 MG: 100 CAPSULE ORAL at 12:48

## 2022-07-31 RX ADMIN — MEMANTINE 10 MG: 10 TABLET ORAL at 08:51

## 2022-07-31 RX ADMIN — MIRTAZAPINE 15 MG: 15 TABLET, FILM COATED ORAL at 20:12

## 2022-07-31 RX ADMIN — BUSPIRONE HYDROCHLORIDE 30 MG: 15 TABLET ORAL at 08:51

## 2022-07-31 RX ADMIN — CLOZAPINE 250 MG: 100 TABLET ORAL at 20:12

## 2022-07-31 RX ADMIN — MEMANTINE 10 MG: 10 TABLET ORAL at 20:11

## 2022-07-31 RX ADMIN — SALMETEROL XINAFOATE 1 PUFF: 50 POWDER, METERED ORAL; RESPIRATORY (INHALATION) at 20:12

## 2022-07-31 RX ADMIN — LEVOTHYROXINE SODIUM 88 MCG: 0.09 TABLET ORAL at 08:51

## 2022-07-31 RX ADMIN — BENZTROPINE MESYLATE 0.5 MG: 0.5 TABLET ORAL at 08:51

## 2022-07-31 RX ADMIN — GABAPENTIN 100 MG: 100 CAPSULE ORAL at 08:51

## 2022-07-31 RX ADMIN — DONEPEZIL HYDROCHLORIDE 10 MG: 10 TABLET ORAL at 20:11

## 2022-07-31 RX ADMIN — BUSPIRONE HYDROCHLORIDE 30 MG: 15 TABLET ORAL at 20:11

## 2022-07-31 RX ADMIN — BENZTROPINE MESYLATE 0.5 MG: 0.5 TABLET ORAL at 20:12

## 2022-07-31 RX ADMIN — ASPIRIN 81 MG: 81 TABLET, COATED ORAL at 08:51

## 2022-07-31 RX ADMIN — GABAPENTIN 100 MG: 100 CAPSULE ORAL at 18:41

## 2022-07-31 RX ADMIN — MEGESTROL ACETATE 20 MG: 20 TABLET ORAL at 08:51

## 2022-07-31 RX ADMIN — Medication 5 MG: at 20:12

## 2022-07-31 ASSESSMENT — ACTIVITIES OF DAILY LIVING (ADL)
ADLS_ACUITY_SCORE: 70

## 2022-07-31 NOTE — PLAN OF CARE
Goal Outcome Evaluation:    Plan of Care Reviewed With: patient     Pt agreeable to this writer changing bed sheets, pt declines shower, pt cont of urine, gait slow and steady. Pt alert to self only, appears responding, pt declines to check-in at this time.     Dinner: Magic cup and OJ only  Intake: 240 mL    Plan:  Continue to encourage fluids and increased activity  Continue to encourage group participation  Continue to encourage choices and independence  Continue to encourage non-pharmacological coping skills

## 2022-07-31 NOTE — PLAN OF CARE
Goal Outcome Evaluation:    Plan of Care Reviewed With: patient     Pt in bed all shift, pt agreeable to this writer assisting him to change brief and scrub bottoms, bed sheet soiled, pt declined to allow this writer to change, plan to keep trying. Pt oriented to person only. Flat/blunt affect. Pt appears responding and sad.     Breakfast: 50% with tray set up and reminders to eat.  Lunch: 25% with tray set up and reminders to eat.     Intake: 850 mL    Plan:  Continue to encourage fluids and increased activity  Continue to encourage group participation  Continue to encourage choices and independence  Continue to encourage non-pharmacological coping skills

## 2022-07-31 NOTE — PLAN OF CARE
Goal Outcome Evaluation:      Patient slept comfortably for 9.25 hours. Nothing unusual noted. No concerns raised.

## 2022-08-01 PROCEDURE — 99231 SBSQ HOSP IP/OBS SF/LOW 25: CPT | Performed by: PSYCHIATRY & NEUROLOGY

## 2022-08-01 PROCEDURE — 250N000013 HC RX MED GY IP 250 OP 250 PS 637: Performed by: PSYCHIATRY & NEUROLOGY

## 2022-08-01 PROCEDURE — 124N000003 HC R&B MH SENIOR/ADOLESCENT

## 2022-08-01 RX ADMIN — BENZTROPINE MESYLATE 0.5 MG: 0.5 TABLET ORAL at 19:38

## 2022-08-01 RX ADMIN — MEGESTROL ACETATE 20 MG: 20 TABLET ORAL at 08:35

## 2022-08-01 RX ADMIN — ASPIRIN 81 MG: 81 TABLET, COATED ORAL at 08:36

## 2022-08-01 RX ADMIN — MEMANTINE 10 MG: 10 TABLET ORAL at 08:36

## 2022-08-01 RX ADMIN — SALMETEROL XINAFOATE 1 PUFF: 50 POWDER, METERED ORAL; RESPIRATORY (INHALATION) at 08:36

## 2022-08-01 RX ADMIN — CLOZAPINE 250 MG: 100 TABLET ORAL at 19:38

## 2022-08-01 RX ADMIN — GABAPENTIN 100 MG: 100 CAPSULE ORAL at 12:28

## 2022-08-01 RX ADMIN — SALMETEROL XINAFOATE 1 PUFF: 50 POWDER, METERED ORAL; RESPIRATORY (INHALATION) at 19:43

## 2022-08-01 RX ADMIN — LEVOTHYROXINE SODIUM 88 MCG: 0.09 TABLET ORAL at 08:36

## 2022-08-01 RX ADMIN — BENZTROPINE MESYLATE 0.5 MG: 0.5 TABLET ORAL at 08:36

## 2022-08-01 RX ADMIN — DONEPEZIL HYDROCHLORIDE 10 MG: 10 TABLET ORAL at 19:39

## 2022-08-01 RX ADMIN — GABAPENTIN 100 MG: 100 CAPSULE ORAL at 17:27

## 2022-08-01 RX ADMIN — BUSPIRONE HYDROCHLORIDE 30 MG: 15 TABLET ORAL at 19:38

## 2022-08-01 RX ADMIN — BUSPIRONE HYDROCHLORIDE 30 MG: 15 TABLET ORAL at 08:36

## 2022-08-01 RX ADMIN — GABAPENTIN 100 MG: 100 CAPSULE ORAL at 08:36

## 2022-08-01 RX ADMIN — MIRTAZAPINE 15 MG: 15 TABLET, FILM COATED ORAL at 19:38

## 2022-08-01 RX ADMIN — MEMANTINE 10 MG: 10 TABLET ORAL at 19:39

## 2022-08-01 RX ADMIN — Medication 5 MG: at 19:38

## 2022-08-01 ASSESSMENT — ACTIVITIES OF DAILY LIVING (ADL)
ADLS_ACUITY_SCORE: 76
ADLS_ACUITY_SCORE: 70
ADLS_ACUITY_SCORE: 76
ADLS_ACUITY_SCORE: 70
ADLS_ACUITY_SCORE: 76
ADLS_ACUITY_SCORE: 70
ADLS_ACUITY_SCORE: 76

## 2022-08-01 NOTE — PLAN OF CARE
Patient spends most of the time in his room isolative and withdrawn to self. Encouraged to attend groups and socialize in the [facility] area but declined. Came out for meals with staff assistance. This morning during transfer from bed, pt lost balance and tilted to the right side, bedside table scraped the arm no bleeding noted. Staff present assisting patient during lose of balance. No falls. /88, 104 sitting. Upon assessment, no other visible injuries noted/reported. Provider updated during team meeting, no new orders, will keep close eye on the patient and continue with current POC assisting patient to and fro meals and bathroom. Safety for the patient ensured, no falls reported/noted.  Endorsed depression due to staying in the hospital for long, denies all other mental health symptoms including SI/SIB/AVH/HI. Denies pain. Continues with confusion and disoriented to time, situation. Reoriented as needed.      Goal Outcome Evaluation:   Problem: Fall Injury Risk  Goal: Absence of Fall and Fall-Related Injury  Outcome: Ongoing, Progressing  Intervention: Identify and Manage Contributors  Recent Flowsheet Documentation  Taken 8/1/2022 1100 by Riaz Yang, RN  Medication Review/Management: medications reviewed  Intervention: Promote Injury-Free Environment  Recent Flowsheet Documentation  Taken 8/1/2022 1100 by Riaz Yang, RN  Safety Promotion/Fall Prevention:    safety round/check completed    clutter free environment maintained       Plan of Care Reviewed With: patient

## 2022-08-01 NOTE — PROGRESS NOTES
PSYCHIATRY  PROGRESS NOTE     DATE OF SERVICE   08/01/2022       CHIEF COMPLAINT   Patient was admitted due to inability to safely care for himself and psychosis.       SUBJECTIVE   Nursing reports:   Plan of Care Reviewed With: patient      Pt agreeable to this writer changing bed sheets, pt declines shower, pt cont of urine, gait slow and steady. Pt alert to self only, appears responding, pt declines to check-in at this time.      Dinner: Magic cup and OJ only  Intake: 240 mL     Plan:  Continue to encourage fluids and increased activity  Continue to encourage group participation  Continue to encourage choices and independence  Continue to encourage non-pharmacological coping skills     Patient has been discussed in detail with the  during team meeting.  Again we have not received any updates on the guardianship.     OBJECTIVE   Patient was seen and evaluated at bedside by himself, this was a face-to-face evaluation.  Patient continues to be stable and at his baseline.  He tells me that he is doing fine and denied any problems with his medications.  Patient said that he did not had too much of an appetite but he has been drinking liquids.  Continues to ask about his discharge, I informed the patient that we continue to wait for the decision of the .  It is unclear how much the patient was able to understand any of this.       MEDICATIONS   Medications:  Scheduled Meds:    aspirin  81 mg Oral Daily     benztropine  0.5 mg Oral BID     busPIRone HCl  30 mg Oral BID     cloZAPine  250 mg Oral At Bedtime     donepezil  10 mg Oral At Bedtime     gabapentin  100 mg Oral TID w/meals     levothyroxine  88 mcg Oral Daily     megestrol  20 mg Oral Daily     melatonin  5 mg Oral At Bedtime     memantine  10 mg Oral BID     mirtazapine  15 mg Oral At Bedtime     salmeterol  1 puff Inhalation BID     Continuous Infusions:  PRN Meds:.albuterol, alum & mag hydroxide-simethicone, benzocaine-menthol, hydrOXYzine,  "nicotine, nystatin, polyethylene glycol, polyethylene glycol-propylene glycol PF, senna-docusate    Medication adherence issues: MS Med Adherence Y/N: Yes, Hospitalization  Medication side effects: MEDICATION SIDE EFFECTS: no side effects reported  Benefit: Yes / No: Yes       ROS   A comprehensive review of systems was negative.       MENTAL STATUS EXAM   Vitals: /88   Pulse 104   Temp 97.2  F (36.2  C)   Resp 18   Ht 1.778 m (5' 10\")   Wt 78 kg (172 lb)   SpO2 98%   BMI 24.68 kg/m       Same as last visit  Appearance:  No apparent distress  Mood: \"Feeling all right\"  Affect: Calm, pleasant and bright  Suicidal Ideation: Denies  Homicidal Ideation: Denies  Thought process: Disorganized  Thought content: Remains confused and delusional and hallucinating at times.    Fund of Knowledge: Below average  Attention/Concentration: Poor  Language ability: Significantly impaired  Memory: Global memory impairment  Insight:  Poor.  Judgement: Poor  Orientation: Only to person  Psychomotor Behavior: slowed    Muscle Strength and Tone: MuscleStrength: Normal and Atrophy  Gait and Station: Not evaluated       LABS   personally reviewed.     No results found for: PHENYTOIN, PHENOBARB, VALPROATE, CBMZ       DIAGNOSIS   Principal Problem:    Major neurocognitive disorder, due to multiple etiologies, with behavioral disturbance, severe (H)    Active Problem List:  Patient Active Problem List   Diagnosis     TBI with aggressive behavior     HTN (hypertension)     COPD (chronic obstructive pulmonary disease) (H)     Dementia with behavioral disturbance (H)     Chronic schizophrenia (H)     Smoker     Agitation     Behavior disturbance     Psychosis (H)     Major neurocognitive disorder, due to multiple etiologies, with behavioral disturbance, severe (H)     Paranoid schizophrenia, chronic condition with acute exacerbation (H)     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 Ongoing education given regarding diagnostic and treatment options with risks, benefits and alternatives and adequate verbalization of understanding.  2.  Medications       BuSpar 30 mg 2 times daily       Cogentin 0.5 mg 2 times a day       Clozaril 250 mg at bedtime       Aricept 10 mg at bedtime       Gabapentin 100 mg 3 times a day       Namenda 10 mg 2 times a day       Remeron 15 mg at bedtime       melatonin 5 mg at bedtime  3.  Medical team following the patient   4.   coordinating a safe discharge plan with the patient.    CBC today is within normal limits.  Risk Assessment: [facility] RISK ASSESSMENT: Patient able to contract for safety    Coordination of Care:   Treatment Plan reviewed and physician signed, Care discussed with Care/Treatment Team Members, Chart reviewed and Patient seen      Re-Certification I certify that the inpatient psychiatric facility services furnished since the previous certification were, and continue to be, medically necessary for, either, treatment which could reasonably be expected to improve the patient s condition or diagnostic study and that the hospital records indicate that the services furnished were, either, intensive treatment services, admission and related services necessary for diagnostic study, or equivalent services.     I certify that the patient continues to need, on a daily basis, active treatment furnished directly by or requiring the supervision of inpatient psychiatric facility personnel.   I estimate 14 days of hospitalization is necessary for proper treatment of the patient. My plans for post-hospital care for this patient are  TBD     Ginger Dubon MD    -     08/01/2022  -     2:04 PM    Total time 15 minutes with > 50%spent on coordination of cares and psycho-education.    This note was created with help of  Dragon dictation system. Grammatical / typing errors are not intentional.    Ginger Dubon MD

## 2022-08-01 NOTE — PLAN OF CARE
Goal Outcome Evaluation:      Patient slept well the whole night for 10 hours. Nothing unusual noted. No complaints made.

## 2022-08-01 NOTE — PLAN OF CARE
08/01/22 1207   Individualization/Patient Specific Goals   Patient Personal Strengths resilient   Patient Vulnerabilities lacks insight into illness;housing insecurity;family/relationship conflict;limited support system   Anxieties, Fears or Concerns Patient is at baseline   Individualized Care Needs Patient needs assistance walking to and from his room, needs assistance with all ADLs.   Patient-Specific Goals (Include Timeframe) Patient is at baseline, continue with treatment plan.   Interprofessional Rounds   Summary Patient continues to be stable.   Participants nursing;psychiatrist;CTC;OT   Team Discussion   Participants Dr. Root, RN, CTC, OT   Progress Patient is at baseline, no new progress.   Anticipated length of stay 10-14 days   Continued Stay Criteria/Rationale Patient is unable to care for himself, needs assistance with all ADLs, lacks capacity, is not able to sign himself into a nursing home. Patient is not able to discharge safely. Team is waiting for the court to rule on guardianship case. Patient is on commitment/p[place].   Medical/Physical Please see H and P.   Plan Continue with plan. Medication management, psychiatric evaluations, nursing assessments, group therapy, milieu therapy, and CTC case management. Team is waiting on guardianship ruling, patient will then receive [place] Choice Assessment ([facility] is refusing to complete the assessment without guardianship in place), patient has placement waiting.   Anticipated Discharge Disposition assisted living     PRECAUTIONS AND SAFETY    Behavioral Orders   Procedures    Code 1 - Restrict to Unit    Code 2     CT scan only    Code 3     Patient can go out of the unit with staff supervision. He needs to be taken out by him self with 2 staff and security present.    Fall precautions    Routine Programming     As clinically indicated    Status 15     Every 15 minutes.    Status Individual Observation     Patient SIO status reviewed with  team/RN.  Please also refer to RN/team documentation for add'l detail.    -SIO staff to monitor following which have contributed to patient being on SIO:  Unresponsive episode with increased confusion  -Possible interventions SIO staff could use to support patient's treatment progress:  Monitor patient for further unresponsive episodes, SBA with cares and ambulation  -When following observed, team will review discontinuation of SIO:  Improved medical condition     Order Specific Question:   CONTINUOUS 24 hours / day     Answer:   Other     Order Specific Question:   Specify distance     Answer:   10ft     Order Specific Question:   Indications for SIO     Answer:   Self-injury risk    Status Individual Observation     Patient SIO status reviewed with team/RN.  Please also refer to RN/team documentation for add'l detail.    -SIO staff to monitor following which have contributed to patient being on SIO:  Unresponsive episode with increased confusion  -Possible interventions SIO staff could use to support patient's treatment progress:  Monitor patient for further unresponsive episodes, SBA with cares and ambulation  -When following observed, team will review discontinuation of SIO:  Improved medical condition     Order Specific Question:   CONTINUOUS 24 hours / day     Answer:   Other     Order Specific Question:   Specify distance     Answer:   10ft     Order Specific Question:   Indications for SIO     Answer:   Self-injury risk       Safety  Safety WDL: WDL  Patient Location: patient room, own  Observed Behavior: calm  Observed Behavior (Comment): napping  Safety Measures: environmental rounds completed, safety rounds completed  Diversional Activity: other (see comments) (sleeping)  Suicidality: Status 15, Minimal furniture in room, Minimal personal belongings in room  Seizure precautions: clutter free environment  Assault: status 15  Elopement Assessment: Distress about legal situation (holds for mental health or  criminal)  Elopement Interventions: status 15  Sexual: status 15, private room

## 2022-08-02 PROCEDURE — 250N000013 HC RX MED GY IP 250 OP 250 PS 637: Performed by: PSYCHIATRY & NEUROLOGY

## 2022-08-02 PROCEDURE — 99207 PR NO BILLABLE SERVICE THIS VISIT: CPT | Performed by: PSYCHIATRY & NEUROLOGY

## 2022-08-02 PROCEDURE — 124N000003 HC R&B MH SENIOR/ADOLESCENT

## 2022-08-02 RX ADMIN — SALMETEROL XINAFOATE 1 PUFF: 50 POWDER, METERED ORAL; RESPIRATORY (INHALATION) at 08:12

## 2022-08-02 RX ADMIN — SALMETEROL XINAFOATE 1 PUFF: 50 POWDER, METERED ORAL; RESPIRATORY (INHALATION) at 19:16

## 2022-08-02 RX ADMIN — GABAPENTIN 100 MG: 100 CAPSULE ORAL at 08:16

## 2022-08-02 RX ADMIN — MIRTAZAPINE 15 MG: 15 TABLET, FILM COATED ORAL at 19:16

## 2022-08-02 RX ADMIN — MEMANTINE 10 MG: 10 TABLET ORAL at 08:16

## 2022-08-02 RX ADMIN — BUSPIRONE HYDROCHLORIDE 30 MG: 15 TABLET ORAL at 19:16

## 2022-08-02 RX ADMIN — BUSPIRONE HYDROCHLORIDE 30 MG: 15 TABLET ORAL at 08:16

## 2022-08-02 RX ADMIN — GABAPENTIN 100 MG: 100 CAPSULE ORAL at 17:25

## 2022-08-02 RX ADMIN — MEMANTINE 10 MG: 10 TABLET ORAL at 19:16

## 2022-08-02 RX ADMIN — MEGESTROL ACETATE 20 MG: 20 TABLET ORAL at 08:17

## 2022-08-02 RX ADMIN — BENZTROPINE MESYLATE 0.5 MG: 0.5 TABLET ORAL at 08:16

## 2022-08-02 RX ADMIN — CLOZAPINE 250 MG: 100 TABLET ORAL at 19:16

## 2022-08-02 RX ADMIN — GABAPENTIN 100 MG: 100 CAPSULE ORAL at 12:28

## 2022-08-02 RX ADMIN — Medication 5 MG: at 19:16

## 2022-08-02 RX ADMIN — DONEPEZIL HYDROCHLORIDE 10 MG: 10 TABLET ORAL at 19:16

## 2022-08-02 RX ADMIN — LEVOTHYROXINE SODIUM 88 MCG: 0.09 TABLET ORAL at 08:16

## 2022-08-02 RX ADMIN — BENZTROPINE MESYLATE 0.5 MG: 0.5 TABLET ORAL at 19:16

## 2022-08-02 RX ADMIN — ASPIRIN 81 MG: 81 TABLET, COATED ORAL at 08:16

## 2022-08-02 ASSESSMENT — ACTIVITIES OF DAILY LIVING (ADL)
ADLS_ACUITY_SCORE: 76

## 2022-08-02 NOTE — PROGRESS NOTES
Patient remains at his baseline and no new behaviors have been reported.  He has been calm, pleasant and cooperative and compliant with his medications.    Ginger Dubon MD

## 2022-08-02 NOTE — PLAN OF CARE
Problem: Cognitive Impairment (Psychotic Signs/Symptoms)  Goal: Optimal Cognitive Function (Psychotic Signs/Symptoms)  Outcome: Ongoing, Not Progressing  Intervention: Support and Promote Cognitive Ability  Recent Flowsheet Documentation  Taken 8/1/2022 1844 by Jonelle Delatorre RN  Trust Relationship/Rapport:    care explained    choices provided    emotional support provided    questions answered    empathic listening provided    questions encouraged    reassurance provided    thoughts/feelings acknowledged     Problem: Decreased Participation and Engagement (Psychotic Signs/Symptoms)  Goal: Increased Participation and Engagement (Psychotic Signs/Symptoms)  Outcome: Ongoing, Not Progressing  Intervention: Facilitate Participation and Engagement  Recent Flowsheet Documentation  Taken 8/1/2022 1844 by Jonelle Delatorre RN  Supportive Measures:    problem-solving facilitated    relaxation techniques promoted    self-care encouraged    self-reflection promoted    self-responsibility promoted    verbalization of feelings encouraged  Diversional Activity: (sleeping) other (see comments)   Goal Outcome Evaluation:    Plan of Care Reviewed With: patient     Patient remains isolative and withdrawn. He is calm with a flat affect. Responding to internal stimuli not observed. He is napping majority of the shift. He gives very minimal or limited answers to qestions asked. Denies SI/SIB nor hallucinations. Denies mental health issues. He ate good at dinner time. Consumed almost 75% of his dinner. Fluids pushed. His gait remains unsteady. He still needs SBA for transfer and when ambulating down the [place]. Patient refused to attend groups. He looks unkempt and hair is greasy. He refused to take a shower nor bed bath. Patient is med compliant. /76; P-105.

## 2022-08-02 NOTE — PLAN OF CARE
Problem: Cognitive Impairment (Psychotic Signs/Symptoms)  Goal: Optimal Cognitive Function (Psychotic Signs/Symptoms)  Outcome: Ongoing, Not Progressing  Intervention: Support and Promote Cognitive Ability  Recent Flowsheet Documentation  Taken 8/2/2022 1000 by Juarez Murphy RN  Trust Relationship/Rapport:   care explained   choices provided   questions encouraged   thoughts/feelings acknowledged   Goal Outcome Evaluation:    Plan of Care Reviewed With: patient     Patient is calm, medication compliant. Isolative and withdrawn. Alert to self only. Ate breakfast in room but came out for lunch, appetite is fair, fluids pushed. BP 89/59 @1100, after lunch BP rechecked 92/62 @1325. Declined assistance with ADLs or a shower, did not attend group. No further concerns.

## 2022-08-02 NOTE — PLAN OF CARE
Goal Outcome Evaluation:    Plan of Care Reviewed With: patient      Patient slept well the whole night for 9.5 hours. No complaints made. Nothing unusual noted.

## 2022-08-03 PROCEDURE — 250N000013 HC RX MED GY IP 250 OP 250 PS 637: Performed by: PSYCHIATRY & NEUROLOGY

## 2022-08-03 PROCEDURE — 99231 SBSQ HOSP IP/OBS SF/LOW 25: CPT | Performed by: PSYCHIATRY & NEUROLOGY

## 2022-08-03 PROCEDURE — 124N000003 HC R&B MH SENIOR/ADOLESCENT

## 2022-08-03 RX ADMIN — MEMANTINE 10 MG: 10 TABLET ORAL at 19:35

## 2022-08-03 RX ADMIN — BUSPIRONE HYDROCHLORIDE 30 MG: 15 TABLET ORAL at 19:34

## 2022-08-03 RX ADMIN — DONEPEZIL HYDROCHLORIDE 10 MG: 10 TABLET ORAL at 19:34

## 2022-08-03 RX ADMIN — BENZTROPINE MESYLATE 0.5 MG: 0.5 TABLET ORAL at 19:34

## 2022-08-03 RX ADMIN — SALMETEROL XINAFOATE 1 PUFF: 50 POWDER, METERED ORAL; RESPIRATORY (INHALATION) at 08:28

## 2022-08-03 RX ADMIN — SALMETEROL XINAFOATE 1 PUFF: 50 POWDER, METERED ORAL; RESPIRATORY (INHALATION) at 19:35

## 2022-08-03 RX ADMIN — MEMANTINE 10 MG: 10 TABLET ORAL at 08:27

## 2022-08-03 RX ADMIN — BENZTROPINE MESYLATE 0.5 MG: 0.5 TABLET ORAL at 08:27

## 2022-08-03 RX ADMIN — CLOZAPINE 250 MG: 100 TABLET ORAL at 19:34

## 2022-08-03 RX ADMIN — ASPIRIN 81 MG: 81 TABLET, COATED ORAL at 08:27

## 2022-08-03 RX ADMIN — LEVOTHYROXINE SODIUM 88 MCG: 0.09 TABLET ORAL at 08:27

## 2022-08-03 RX ADMIN — GABAPENTIN 100 MG: 100 CAPSULE ORAL at 08:27

## 2022-08-03 RX ADMIN — GABAPENTIN 100 MG: 100 CAPSULE ORAL at 17:44

## 2022-08-03 RX ADMIN — MEGESTROL ACETATE 20 MG: 20 TABLET ORAL at 08:27

## 2022-08-03 RX ADMIN — MIRTAZAPINE 15 MG: 15 TABLET, FILM COATED ORAL at 19:35

## 2022-08-03 RX ADMIN — GABAPENTIN 100 MG: 100 CAPSULE ORAL at 12:51

## 2022-08-03 RX ADMIN — BUSPIRONE HYDROCHLORIDE 30 MG: 15 TABLET ORAL at 08:27

## 2022-08-03 RX ADMIN — Medication 5 MG: at 19:34

## 2022-08-03 ASSESSMENT — ACTIVITIES OF DAILY LIVING (ADL)
ADLS_ACUITY_SCORE: 76

## 2022-08-03 NOTE — PROGRESS NOTES
PSYCHIATRY  PROGRESS NOTE     DATE OF SERVICE   08/03/2022       CHIEF COMPLAINT   Patient was admitted due to inability to safely care for himself and psychosis.       SUBJECTIVE   Nursing reports:   Patient's mood is calm with a flat affect. He refused to come out for dinner. Dinner tray brought into his room. He ate only about 25% of food served. Denied SI/SIB nor hallucinations. No evidence of responding to internal stimuli. He has been quiet in his room mostly napping. He went to the bathroom x 2 and voided freely to clear yellow urine. Patient attended in the music therapy session. He still was so quiet. Patient is med compliant. His BP:102/74; P=95.    Patient has been discussed in detail with the  during team meeting.  Again we have not received any updates on the guardianship.     OBJECTIVE   Patient was seen and evaluated at bedside by himself, this was a face-to-face evaluation.  Patient reported that he is doing well and he was preoccupied with food.  Patient was asking for this writer to bring him peanut butter and jelly sandwich with orange juice.  Other than that the patient denied any issues at the moment.       MEDICATIONS   Medications:  Scheduled Meds:    aspirin  81 mg Oral Daily     benztropine  0.5 mg Oral BID     busPIRone HCl  30 mg Oral BID     cloZAPine  250 mg Oral At Bedtime     donepezil  10 mg Oral At Bedtime     gabapentin  100 mg Oral TID w/meals     levothyroxine  88 mcg Oral Daily     megestrol  20 mg Oral Daily     melatonin  5 mg Oral At Bedtime     memantine  10 mg Oral BID     mirtazapine  15 mg Oral At Bedtime     salmeterol  1 puff Inhalation BID     Continuous Infusions:  PRN Meds:.albuterol, alum & mag hydroxide-simethicone, benzocaine-menthol, hydrOXYzine, nicotine, nystatin, polyethylene glycol, polyethylene glycol-propylene glycol PF, senna-docusate    Medication adherence issues: MS Med Adherence Y/N: Yes, Hospitalization  Medication side effects: MEDICATION  "SIDE EFFECTS: no side effects reported  Benefit: Yes / No: Yes       ROS   A comprehensive review of systems was negative.       MENTAL STATUS EXAM   Vitals: /87 (BP Location: Right arm)   Pulse 98   Temp 97.1  F (36.2  C) (Temporal)   Resp 16   Ht 1.778 m (5' 10\")   Wt 78 kg (172 lb)   SpO2 100%   BMI 24.68 kg/m         Appearance:  No apparent distress  Mood: \"Feeling all right\"  Affect: Restricted  Suicidal Ideation: Denies  Homicidal Ideation: Denies  Thought process: Disorganized  Thought content: Remains confused and delusional and hallucinating at times.    Fund of Knowledge: Below average  Attention/Concentration: Poor  Language ability: Significantly impaired  Memory: Global memory impairment  Insight:  Poor.  Judgement: Poor  Orientation: Only to person  Psychomotor Behavior: slowed    Muscle Strength and Tone: MuscleStrength: Normal and Atrophy  Gait and Station: Not evaluated       LABS   personally reviewed.     No results found for: PHENYTOIN, PHENOBARB, VALPROATE, CBMZ       DIAGNOSIS   Principal Problem:    Major neurocognitive disorder, due to multiple etiologies, with behavioral disturbance, severe (H)    Active Problem List:  Patient Active Problem List   Diagnosis     TBI with aggressive behavior     HTN (hypertension)     COPD (chronic obstructive pulmonary disease) (H)     Dementia with behavioral disturbance (H)     Chronic schizophrenia (H)     Smoker     Agitation     Behavior disturbance     Psychosis (H)     Major neurocognitive disorder, due to multiple etiologies, with behavioral disturbance, severe (H)     Paranoid schizophrenia, chronic condition with acute exacerbation (H)     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 Ongoing education given regarding diagnostic and treatment options with risks, " benefits and alternatives and adequate verbalization of understanding.  2.  Medications       BuSpar 30 mg 2 times daily       Cogentin 0.5 mg 2 times a day       Clozaril 250 mg at bedtime       Aricept 10 mg at bedtime       Gabapentin 100 mg 3 times a day       Namenda 10 mg 2 times a day       Remeron 15 mg at bedtime       melatonin 5 mg at bedtime  3.  Medical team following the patient   4.   coordinating a safe discharge plan with the patient.    CBC today is within normal limits.  Risk Assessment: [facility] RISK ASSESSMENT: Patient able to contract for safety    Coordination of Care:   Treatment Plan reviewed and physician signed, Care discussed with Care/Treatment Team Members, Chart reviewed and Patient seen      Re-Certification I certify that the inpatient psychiatric facility services furnished since the previous certification were, and continue to be, medically necessary for, either, treatment which could reasonably be expected to improve the patient s condition or diagnostic study and that the hospital records indicate that the services furnished were, either, intensive treatment services, admission and related services necessary for diagnostic study, or equivalent services.     I certify that the patient continues to need, on a daily basis, active treatment furnished directly by or requiring the supervision of inpatient psychiatric facility personnel.   I estimate 14 days of hospitalization is necessary for proper treatment of the patient. My plans for post-hospital care for this patient are  TBD     Ginger Dubon MD    -     08/03/2022  -     3:10 PM    Total time 15 minutes with > 50%spent on coordination of cares and psycho-education.    This note was created with help of Dragon dictation system. Grammatical / typing errors are not intentional.    Ginger Dubon MD

## 2022-08-03 NOTE — PLAN OF CARE
Problem: Behavioral Health Plan of Care  Goal: Plan of Care Review  Outcome: Ongoing, Progressing  Flowsheets (Taken 8/3/2022 1304)  Plan of Care Reviewed With: patient  Patient Agreement with Plan of Care: refuses to participate     Problem: Mobility Impairment  Goal: Optimal Mobility [place] and Safety  Outcome: Ongoing, Progressing   Goal Outcome Evaluation:    Plan of Care Reviewed With: patient     Patient is calm, medication compliant. Isolative and withdrawn. Alert to self only. Ate breakfast in room but came out for lunch, appetite is fair. Vital signs stable.

## 2022-08-03 NOTE — PLAN OF CARE
Problem: Behavior Regulation Impairment (Psychotic Signs/Symptoms)  Goal: Improved Behavioral Control (Psychotic Signs/Symptoms)  Outcome: Ongoing, Not Progressing   Goal Outcome Evaluation:    Plan of Care Reviewed With: (P) patient     Patient's mood is calm with a flat affect. He refused to come out for dinner. Dinner tray brought into his room. He ate only about 25% of food served. Denied SI/SIB nor hallucinations. No evidence of responding to internal stimuli. He has been quiet in his room mostly napping. He went to the bathroom x 2 and voided freely to clear yellow urine. Patient attended in the music therapy session. He still was so quiet. Patient is med compliant. His BP:102/74; P=95.

## 2022-08-04 LAB — SARS-COV-2 RNA RESP QL NAA+PROBE: NEGATIVE

## 2022-08-04 PROCEDURE — 250N000013 HC RX MED GY IP 250 OP 250 PS 637: Performed by: PSYCHIATRY & NEUROLOGY

## 2022-08-04 PROCEDURE — 99231 SBSQ HOSP IP/OBS SF/LOW 25: CPT | Performed by: PSYCHIATRY & NEUROLOGY

## 2022-08-04 PROCEDURE — 124N000003 HC R&B MH SENIOR/ADOLESCENT

## 2022-08-04 PROCEDURE — U0005 INFEC AGEN DETEC AMPLI PROBE: HCPCS | Performed by: PSYCHIATRY & NEUROLOGY

## 2022-08-04 RX ADMIN — ASPIRIN 81 MG: 81 TABLET, COATED ORAL at 08:19

## 2022-08-04 RX ADMIN — MEMANTINE 10 MG: 10 TABLET ORAL at 20:01

## 2022-08-04 RX ADMIN — BENZTROPINE MESYLATE 0.5 MG: 0.5 TABLET ORAL at 20:00

## 2022-08-04 RX ADMIN — MEMANTINE 10 MG: 10 TABLET ORAL at 08:19

## 2022-08-04 RX ADMIN — LEVOTHYROXINE SODIUM 88 MCG: 0.09 TABLET ORAL at 08:20

## 2022-08-04 RX ADMIN — SALMETEROL XINAFOATE 1 PUFF: 50 POWDER, METERED ORAL; RESPIRATORY (INHALATION) at 20:01

## 2022-08-04 RX ADMIN — GABAPENTIN 100 MG: 100 CAPSULE ORAL at 08:20

## 2022-08-04 RX ADMIN — CLOZAPINE 250 MG: 100 TABLET ORAL at 20:00

## 2022-08-04 RX ADMIN — DONEPEZIL HYDROCHLORIDE 10 MG: 10 TABLET ORAL at 20:01

## 2022-08-04 RX ADMIN — Medication 5 MG: at 20:01

## 2022-08-04 RX ADMIN — BENZTROPINE MESYLATE 0.5 MG: 0.5 TABLET ORAL at 08:19

## 2022-08-04 RX ADMIN — GABAPENTIN 100 MG: 100 CAPSULE ORAL at 18:06

## 2022-08-04 RX ADMIN — BUSPIRONE HYDROCHLORIDE 30 MG: 15 TABLET ORAL at 20:01

## 2022-08-04 RX ADMIN — MIRTAZAPINE 15 MG: 15 TABLET, FILM COATED ORAL at 20:01

## 2022-08-04 RX ADMIN — MEGESTROL ACETATE 20 MG: 20 TABLET ORAL at 08:20

## 2022-08-04 RX ADMIN — GABAPENTIN 100 MG: 100 CAPSULE ORAL at 12:21

## 2022-08-04 RX ADMIN — BUSPIRONE HYDROCHLORIDE 30 MG: 15 TABLET ORAL at 08:19

## 2022-08-04 RX ADMIN — SALMETEROL XINAFOATE 1 PUFF: 50 POWDER, METERED ORAL; RESPIRATORY (INHALATION) at 08:20

## 2022-08-04 ASSESSMENT — ACTIVITIES OF DAILY LIVING (ADL)
ADLS_ACUITY_SCORE: 76

## 2022-08-04 NOTE — PLAN OF CARE
Problem: Sleep Disturbance  Goal: Adequate Sleep/Rest  Outcome: Ongoing, Progressing   Goal Outcome Evaluation:    Patient slept a total of 10 hours without any interruptions. No behavioral issues noted the whole shift.

## 2022-08-04 NOTE — PROGRESS NOTES
"PSYCHIATRY  PROGRESS NOTE     DATE OF SERVICE   08/04/2022       CHIEF COMPLAINT   Patient was admitted due to inability to safely care for himself and psychosis.       SUBJECTIVE   Nursing reports:   Pt observed isolative and withdrawn this shift. He spent most of his time in his room, resting in bed. Pt was encouraged to be out in the milieu but pt declined. Re approached but still refused, stating \"No. I'm fine\". Presents with restricted affect, guarded, dismissive but was in a calm mood. Pt appeared unkempt. Encouraged pt to take a shower, pt declined. Pt denies SI/SIB/HI. He did not demonstrate any self injurious behavior. Pt denies experiencing any type of hallucinations. Denies having anxiety and depression.     Patient has been discussed in detail with the  during team meeting.  We continue with no updates about the guardianship process.     OBJECTIVE   Patient was seen and evaluated at bedside by himself, this was a face-to-face evaluation.  Patient remains at his baseline and no new behaviors have been reported.  He continues to denied all psychiatric symptoms.  He has been isolative, withdrawn and at times hallucinating.  Continues to be delusional but only verbalize these delusions when ask.  Denies any suicidal ideations and has been able to contract for safety.       MEDICATIONS   Medications:  Scheduled Meds:    aspirin  81 mg Oral Daily     benztropine  0.5 mg Oral BID     busPIRone HCl  30 mg Oral BID     cloZAPine  250 mg Oral At Bedtime     donepezil  10 mg Oral At Bedtime     gabapentin  100 mg Oral TID w/meals     levothyroxine  88 mcg Oral Daily     megestrol  20 mg Oral Daily     melatonin  5 mg Oral At Bedtime     memantine  10 mg Oral BID     mirtazapine  15 mg Oral At Bedtime     salmeterol  1 puff Inhalation BID     Continuous Infusions:  PRN Meds:.albuterol, alum & mag hydroxide-simethicone, benzocaine-menthol, hydrOXYzine, nicotine, nystatin, polyethylene glycol, " "polyethylene glycol-propylene glycol PF, senna-docusate    Medication adherence issues: MS Med Adherence Y/N: Yes, Hospitalization  Medication side effects: MEDICATION SIDE EFFECTS: no side effects reported  Benefit: Yes / No: Yes       ROS   A comprehensive review of systems was negative.       MENTAL STATUS EXAM   Vitals: /83   Pulse 87   Temp 97.5  F (36.4  C) (Temporal)   Resp 18   Ht 1.778 m (5' 10\")   Wt 78 kg (172 lb)   SpO2 97%   BMI 24.68 kg/m       Same as last visit  Appearance:  No apparent distress  Mood: \"Feeling all right\"  Affect: Restricted  Suicidal Ideation: Denies  Homicidal Ideation: Denies  Thought process: Disorganized  Thought content: Remains confused and delusional and hallucinating at times.    Fund of Knowledge: Below average  Attention/Concentration: Poor  Language ability: Significantly impaired  Memory: Global memory impairment  Insight:  Poor.  Judgement: Poor  Orientation: Only to person  Psychomotor Behavior: slowed    Muscle Strength and Tone: MuscleStrength: Normal and Atrophy  Gait and Station: Not evaluated       LABS   personally reviewed.     No results found for: PHENYTOIN, PHENOBARB, VALPROATE, CBMZ       DIAGNOSIS   Principal Problem:    Major neurocognitive disorder, due to multiple etiologies, with behavioral disturbance, severe (H)    Active Problem List:  Patient Active Problem List   Diagnosis     TBI with aggressive behavior     HTN (hypertension)     COPD (chronic obstructive pulmonary disease) (H)     Dementia with behavioral disturbance (H)     Chronic schizophrenia (H)     Smoker     Agitation     Behavior disturbance     Psychosis (H)     Major neurocognitive disorder, due to multiple etiologies, with behavioral disturbance, severe (H)     Paranoid schizophrenia, chronic condition with acute exacerbation (H)     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 Ongoing education given regarding diagnostic and treatment options with risks, benefits and alternatives and adequate verbalization of understanding.  2.  Medications       BuSpar 30 mg 2 times daily       Cogentin 0.5 mg 2 times a day       Clozaril 250 mg at bedtime       Aricept 10 mg at bedtime       Gabapentin 100 mg 3 times a day       Namenda 10 mg 2 times a day       Remeron 15 mg at bedtime       melatonin 5 mg at bedtime  3.  Medical team following the patient   4.   coordinating a safe discharge plan with the patient.    CBC today is within normal limits.  Risk Assessment: [facility] RISK ASSESSMENT: Patient able to contract for safety    Coordination of Care:   Treatment Plan reviewed and physician signed, Care discussed with Care/Treatment Team Members, Chart reviewed and Patient seen      Re-Certification I certify that the inpatient psychiatric facility services furnished since the previous certification were, and continue to be, medically necessary for, either, treatment which could reasonably be expected to improve the patient s condition or diagnostic study and that the hospital records indicate that the services furnished were, either, intensive treatment services, admission and related services necessary for diagnostic study, or equivalent services.     I certify that the patient continues to need, on a daily basis, active treatment furnished directly by or requiring the supervision of inpatient psychiatric facility personnel.   I estimate 14 days of hospitalization is necessary for proper treatment of the patient. My plans for post-hospital care for this patient are  TBD     Ginger Dubon MD    -     08/04/2022  -     2:42 PM    Total time 15 minutes with > 50%spent on coordination of cares and psycho-education.    This note was created with help of Dragon dictation system. Grammatical /  typing errors are not intentional.    Ginger Dbuon MD

## 2022-08-04 NOTE — PLAN OF CARE
Problem: Behavior Regulation Impairment (Psychotic Signs/Symptoms)  Goal: Improved Behavioral Control (Psychotic Signs/Symptoms)  Outcome: Ongoing, Not Progressing     Problem: Cognitive Impairment (Psychotic Signs/Symptoms)  Goal: Optimal Cognitive Function (Psychotic Signs/Symptoms)  Outcome: Ongoing, Not Progressing  Intervention: Support and Promote Cognitive Ability  Recent Flowsheet Documentation  Taken 8/4/2022 1011 by Juarez Murphy RN  Trust Relationship/Rapport:    care explained    choices provided    questions encouraged    thoughts/feelings acknowledged     Problem: Decreased Participation and Engagement (Psychotic Signs/Symptoms)  Goal: Increased Participation and Engagement (Psychotic Signs/Symptoms)  Outcome: Ongoing, Not Progressing     Problem: Mood Impairment (Psychotic Signs/Symptoms)  Goal: Improved Mood Symptoms (Psychotic Signs/Symptoms)  Outcome: Ongoing, Not Progressing   Goal Outcome Evaluation:    Plan of Care Reviewed With: patient     Patient is calm cooperative, medication compliant. Remains isolative and withdrawn. Came out for meals, appetite fair, ate 50% of breakfast and 25% of lunch. Declined shower or assistance with ADLs, patient did report willingness to change scrubs this evening.    PM: patient is calm, isolative and withdrawn. Patient declined to come out for dinner, ate in room. Patient was visible in milieu for a few minutes then went back to room after getting a snack.  medication compliant. Changed scrub top with writer but refused to change scrub pants or perform other ADLs.  Covid test (-)

## 2022-08-04 NOTE — PLAN OF CARE
"Problem: Decreased Participation and Engagement (Psychotic Signs/Symptoms)  Goal: Increased Participation and Engagement (Psychotic Signs/Symptoms)  Outcome: Ongoing, Not Progressing   Goal Outcome Evaluation:    Plan of Care Reviewed With: patient      Pt observed isolative and withdrawn this shift. He spent most of his time in his room, resting in bed. Pt was encouraged to be out in the milieu but pt declined. Re approached but still refused, stating \"No. I'm fine\". Presents with restricted affect, guarded, dismissive but was in a calm mood. Pt appeared unkempt. Encouraged pt to take a shower, pt declined. Pt denies SI/SIB/HI. He did not demonstrate any self injurious behavior. Pt denies experiencing any type of hallucinations. Denies having anxiety and depression.     Medical Concerns: pt denies pain. VSS. Pt denies dizziness or lightheadedness.   Appetite: Consumed 50% of share of dinner and took approximately 500 ml of fluids.   Sleep: pt denies concerns and was observed napping this shift.   LBM: pt could not remember if he had a bowel movement today. Pt claimed \"I don't know. I'm fine\". He was observed using the toilet 2x this shift.   ADLs: pt needs stand by assist with using the toilet every 2 hours.   PRNs given: No PRN medications given this shift.     Due medications given. Denies experiencing side effects.    Needs attended to. On fall precautions. No further concerns noted as of this time.             "

## 2022-08-05 LAB
BASOPHILS # BLD AUTO: 0.1 10E3/UL (ref 0–0.2)
BASOPHILS NFR BLD AUTO: 2 %
EOSINOPHIL # BLD AUTO: 1.6 10E3/UL (ref 0–0.7)
EOSINOPHIL NFR BLD AUTO: 26 %
ERYTHROCYTE [DISTWIDTH] IN BLOOD BY AUTOMATED COUNT: 13.8 % (ref 10–15)
HCT VFR BLD AUTO: 43.2 % (ref 40–53)
HGB BLD-MCNC: 15.3 G/DL (ref 13.3–17.7)
IMM GRANULOCYTES # BLD: 0 10E3/UL
IMM GRANULOCYTES NFR BLD: 1 %
LYMPHOCYTES # BLD AUTO: 1.8 10E3/UL (ref 0.8–5.3)
LYMPHOCYTES NFR BLD AUTO: 29 %
MCH RBC QN AUTO: 32.2 PG (ref 26.5–33)
MCHC RBC AUTO-ENTMCNC: 35.4 G/DL (ref 31.5–36.5)
MCV RBC AUTO: 91 FL (ref 78–100)
MONOCYTES # BLD AUTO: 0.5 10E3/UL (ref 0–1.3)
MONOCYTES NFR BLD AUTO: 8 %
NEUTROPHILS # BLD AUTO: 2.1 10E3/UL (ref 1.6–8.3)
NEUTROPHILS NFR BLD AUTO: 34 %
NRBC # BLD AUTO: 0 10E3/UL
NRBC BLD AUTO-RTO: 0 /100
PLATELET # BLD AUTO: ABNORMAL 10*3/UL
RBC # BLD AUTO: 4.75 10E6/UL (ref 4.4–5.9)
WBC # BLD AUTO: 6.1 10E3/UL (ref 4–11)

## 2022-08-05 PROCEDURE — 250N000013 HC RX MED GY IP 250 OP 250 PS 637: Performed by: PSYCHIATRY & NEUROLOGY

## 2022-08-05 PROCEDURE — 36415 COLL VENOUS BLD VENIPUNCTURE: CPT | Performed by: PSYCHIATRY & NEUROLOGY

## 2022-08-05 PROCEDURE — 124N000003 HC R&B MH SENIOR/ADOLESCENT

## 2022-08-05 PROCEDURE — 99231 SBSQ HOSP IP/OBS SF/LOW 25: CPT | Performed by: PSYCHIATRY & NEUROLOGY

## 2022-08-05 PROCEDURE — 85041 AUTOMATED RBC COUNT: CPT | Performed by: PSYCHIATRY & NEUROLOGY

## 2022-08-05 RX ADMIN — SALMETEROL XINAFOATE 1 PUFF: 50 POWDER, METERED ORAL; RESPIRATORY (INHALATION) at 08:32

## 2022-08-05 RX ADMIN — MIRTAZAPINE 15 MG: 15 TABLET, FILM COATED ORAL at 20:25

## 2022-08-05 RX ADMIN — MEMANTINE 10 MG: 10 TABLET ORAL at 20:25

## 2022-08-05 RX ADMIN — DONEPEZIL HYDROCHLORIDE 10 MG: 10 TABLET ORAL at 20:25

## 2022-08-05 RX ADMIN — Medication 5 MG: at 20:25

## 2022-08-05 RX ADMIN — BUSPIRONE HYDROCHLORIDE 30 MG: 15 TABLET ORAL at 20:25

## 2022-08-05 RX ADMIN — SALMETEROL XINAFOATE 1 PUFF: 50 POWDER, METERED ORAL; RESPIRATORY (INHALATION) at 20:25

## 2022-08-05 RX ADMIN — GABAPENTIN 100 MG: 100 CAPSULE ORAL at 17:29

## 2022-08-05 RX ADMIN — GABAPENTIN 100 MG: 100 CAPSULE ORAL at 08:31

## 2022-08-05 RX ADMIN — CLOZAPINE 250 MG: 100 TABLET ORAL at 20:25

## 2022-08-05 RX ADMIN — ASPIRIN 81 MG: 81 TABLET, COATED ORAL at 08:32

## 2022-08-05 RX ADMIN — LEVOTHYROXINE SODIUM 88 MCG: 0.09 TABLET ORAL at 08:31

## 2022-08-05 RX ADMIN — GABAPENTIN 100 MG: 100 CAPSULE ORAL at 14:12

## 2022-08-05 RX ADMIN — BUSPIRONE HYDROCHLORIDE 30 MG: 15 TABLET ORAL at 08:31

## 2022-08-05 RX ADMIN — MEGESTROL ACETATE 20 MG: 20 TABLET ORAL at 08:31

## 2022-08-05 RX ADMIN — BENZTROPINE MESYLATE 0.5 MG: 0.5 TABLET ORAL at 20:25

## 2022-08-05 RX ADMIN — BENZTROPINE MESYLATE 0.5 MG: 0.5 TABLET ORAL at 08:32

## 2022-08-05 RX ADMIN — MEMANTINE 10 MG: 10 TABLET ORAL at 08:31

## 2022-08-05 ASSESSMENT — ACTIVITIES OF DAILY LIVING (ADL)
ADLS_ACUITY_SCORE: 76

## 2022-08-05 NOTE — PROGRESS NOTES
"PSYCHIATRY  PROGRESS NOTE     DATE OF SERVICE   08/05/2022       CHIEF COMPLAINT   Patient was admitted due to inability to safely care for himself and psychosis.       SUBJECTIVE   Nursing reports:   Patient evaluation continues. Assessed mood,anxiety,thoughts and behavior. Patient is  progressing towards goals. Patient is encouraged to participate in groups and assisted to develop healthy coping skills.  Patient admits to auditory  hallucinations. /72 (Patient Position: Supine)   Pulse 87   Temp 97.2  F (36.2  C) (Temporal)   Resp 16   Ht 1.778 m (5' 10\")   Wt 78 kg (172 lb)   SpO2 99%   BMI 24.68 kg/m      Patient has been discussed in detail with the  during team meeting.  We continue with no updates about the guardianship process.     OBJECTIVE   Patient was seen and evaluated at bedside by himself, this was a face-to-face evaluation.  Patient was calm during the assessment but he was actively hallucinating.  Patient stated that he has been talking to his mother (mother was not present in the room).  Other than that the patient was calm and cooperative.  Initially he refused to take a shower but later on the staff was able to give him a bath in his bed.  He was cooperative with this.       MEDICATIONS   Medications:  Scheduled Meds:    aspirin  81 mg Oral Daily     benztropine  0.5 mg Oral BID     busPIRone HCl  30 mg Oral BID     cloZAPine  250 mg Oral At Bedtime     donepezil  10 mg Oral At Bedtime     gabapentin  100 mg Oral TID w/meals     levothyroxine  88 mcg Oral Daily     megestrol  20 mg Oral Daily     melatonin  5 mg Oral At Bedtime     memantine  10 mg Oral BID     mirtazapine  15 mg Oral At Bedtime     salmeterol  1 puff Inhalation BID     Continuous Infusions:  PRN Meds:.albuterol, alum & mag hydroxide-simethicone, benzocaine-menthol, hydrOXYzine, nicotine, nystatin, polyethylene glycol, polyethylene glycol-propylene glycol PF, senna-docusate    Medication adherence " "issues: MS Med Adherence Y/N: Yes, Hospitalization  Medication side effects: MEDICATION SIDE EFFECTS: no side effects reported  Benefit: Yes / No: Yes       ROS   A comprehensive review of systems was negative.       MENTAL STATUS EXAM   Vitals: /72 (Patient Position: Supine)   Pulse 87   Temp 97.2  F (36.2  C) (Temporal)   Resp 16   Ht 1.778 m (5' 10\")   Wt 78 kg (172 lb)   SpO2 99%   BMI 24.68 kg/m       Same as last visit  Appearance:  No apparent distress  Mood: \"Feeling all right\"  Affect: Restricted  Suicidal Ideation: Denies  Homicidal Ideation: Denies  Thought process: Disorganized  Thought content: Remains confused and delusional and hallucinating at times.    Fund of Knowledge: Below average  Attention/Concentration: Poor  Language ability: Significantly impaired  Memory: Global memory impairment  Insight:  Poor.  Judgement: Poor  Orientation: Only to person  Psychomotor Behavior: slowed    Muscle Strength and Tone: MuscleStrength: Normal and Atrophy  Gait and Station: Not evaluated       LABS   personally reviewed.     No results found for: PHENYTOIN, PHENOBARB, VALPROATE, CBMZ       DIAGNOSIS   Principal Problem:    Major neurocognitive disorder, due to multiple etiologies, with behavioral disturbance, severe (H)    Active Problem List:  Patient Active Problem List   Diagnosis     TBI with aggressive behavior     HTN (hypertension)     COPD (chronic obstructive pulmonary disease) (H)     Dementia with behavioral disturbance (H)     Chronic schizophrenia (H)     Smoker     Agitation     Behavior disturbance     Psychosis (H)     Major neurocognitive disorder, due to multiple etiologies, with behavioral disturbance, severe (H)     Paranoid schizophrenia, chronic condition with acute exacerbation (H)     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 Ongoing education given regarding diagnostic and treatment options with risks, benefits and alternatives and adequate verbalization of understanding.  2.  Medications       BuSpar 30 mg 2 times daily       Cogentin 0.5 mg 2 times a day       Clozaril 250 mg at bedtime       Aricept 10 mg at bedtime       Gabapentin 100 mg 3 times a day       Namenda 10 mg 2 times a day       Remeron 15 mg at bedtime       melatonin 5 mg at bedtime  3.  Medical team following the patient   4.   coordinating a safe discharge plan with the patient.    CBC today is within normal limits.  Risk Assessment: [facility] RISK ASSESSMENT: Patient able to contract for safety    Coordination of Care:   Treatment Plan reviewed and physician signed, Care discussed with Care/Treatment Team Members, Chart reviewed and Patient seen      Re-Certification I certify that the inpatient psychiatric facility services furnished since the previous certification were, and continue to be, medically necessary for, either, treatment which could reasonably be expected to improve the patient s condition or diagnostic study and that the hospital records indicate that the services furnished were, either, intensive treatment services, admission and related services necessary for diagnostic study, or equivalent services.     I certify that the patient continues to need, on a daily basis, active treatment furnished directly by or requiring the supervision of inpatient psychiatric facility personnel.   I estimate 14 days of hospitalization is necessary for proper treatment of the patient. My plans for post-hospital care for this patient are  TBD     Ginger Dubon MD    -     08/05/2022  -     4:18 PM    Total time 15 minutes with > 50%spent on coordination of cares and psycho-education.    This note was created with help of Dragon dictation system. Grammatical / typing errors are not intentional.    Ginger Dubon MD

## 2022-08-05 NOTE — PLAN OF CARE
"Nursing Assessment    Psychosis (H) [F29]    Admit Date: 1/26/2022    Length of Stay: 191    Patient evaluation continues. Assessed mood,anxiety,thoughts and behavior. Patient is  progressing towards goals. Patient is encouraged to participate in groups and assisted to develop healthy coping skills.  Patient admits to auditory  hallucinations. /72 (Patient Position: Supine)   Pulse 87   Temp 97.2  F (36.2  C) (Temporal)   Resp 16   Ht 1.778 m (5' 10\")   Wt 78 kg (172 lb)   SpO2 99%   BMI 24.68 kg/m      Mood: good    Patient reports depression none and reports anxiety none    Affect: flat blunted but smiled upon approach    Sleep: good    Appetite: fair    SI: denies    HI: denies    SIB: denies      Medication Compliance yes    Group participation:no    ADL's: assisted    Fall risk interventions: proper foot wear, orthostatic B/p, standby assist    Rinku Score Interventions:none    Discharge planning in process    Refer to daily team meeting notes for individualized plan of care. Nursing will continue to assess.    *Scale is 1-10 and 10 is the worst.         Problem: Behavior Regulation Impairment (Psychotic Signs/Symptoms)  Goal: Improved Behavioral Control (Psychotic Signs/Symptoms)  Outcome: Ongoing, Progressing  Flowsheets (Taken 8/5/2022 1441)  Mutually Determined Action Steps (Improved Behavioral Control): verbalizes gratifying activity   Goal Outcome Evaluation:    Plan of Care Reviewed With: patient                 "

## 2022-08-05 NOTE — PLAN OF CARE
Problem: Sleep Disturbance  Goal: Adequate Sleep/Rest  Outcome: Ongoing, Progressing   Goal Outcome Evaluation:    Patient slept a total of 9.25 hours without any interruptions. No behavioral issues noted the whole night.

## 2022-08-06 PROCEDURE — 250N000013 HC RX MED GY IP 250 OP 250 PS 637: Performed by: PSYCHIATRY & NEUROLOGY

## 2022-08-06 PROCEDURE — 124N000003 HC R&B MH SENIOR/ADOLESCENT

## 2022-08-06 RX ADMIN — DONEPEZIL HYDROCHLORIDE 10 MG: 10 TABLET ORAL at 20:12

## 2022-08-06 RX ADMIN — ASPIRIN 81 MG: 81 TABLET, COATED ORAL at 08:52

## 2022-08-06 RX ADMIN — MEMANTINE 10 MG: 10 TABLET ORAL at 08:52

## 2022-08-06 RX ADMIN — SALMETEROL XINAFOATE 1 PUFF: 50 POWDER, METERED ORAL; RESPIRATORY (INHALATION) at 20:13

## 2022-08-06 RX ADMIN — CLOZAPINE 250 MG: 100 TABLET ORAL at 20:12

## 2022-08-06 RX ADMIN — MIRTAZAPINE 15 MG: 15 TABLET, FILM COATED ORAL at 20:12

## 2022-08-06 RX ADMIN — Medication 5 MG: at 20:13

## 2022-08-06 RX ADMIN — BENZTROPINE MESYLATE 0.5 MG: 0.5 TABLET ORAL at 20:13

## 2022-08-06 RX ADMIN — LEVOTHYROXINE SODIUM 88 MCG: 0.09 TABLET ORAL at 08:52

## 2022-08-06 RX ADMIN — GABAPENTIN 100 MG: 100 CAPSULE ORAL at 17:38

## 2022-08-06 RX ADMIN — BUSPIRONE HYDROCHLORIDE 30 MG: 15 TABLET ORAL at 20:13

## 2022-08-06 RX ADMIN — GABAPENTIN 100 MG: 100 CAPSULE ORAL at 12:16

## 2022-08-06 RX ADMIN — SALMETEROL XINAFOATE 1 PUFF: 50 POWDER, METERED ORAL; RESPIRATORY (INHALATION) at 08:54

## 2022-08-06 RX ADMIN — BENZTROPINE MESYLATE 0.5 MG: 0.5 TABLET ORAL at 08:53

## 2022-08-06 RX ADMIN — GABAPENTIN 100 MG: 100 CAPSULE ORAL at 08:52

## 2022-08-06 RX ADMIN — BUSPIRONE HYDROCHLORIDE 30 MG: 15 TABLET ORAL at 08:52

## 2022-08-06 RX ADMIN — MEGESTROL ACETATE 20 MG: 20 TABLET ORAL at 08:52

## 2022-08-06 RX ADMIN — MEMANTINE 10 MG: 10 TABLET ORAL at 20:12

## 2022-08-06 ASSESSMENT — ACTIVITIES OF DAILY LIVING (ADL)
ADLS_ACUITY_SCORE: 76

## 2022-08-06 NOTE — PLAN OF CARE
"  Problem: Behavior Regulation Impairment (Psychotic Signs/Symptoms)  Goal: Improved Behavioral Control (Psychotic Signs/Symptoms)  Outcome: Ongoing, Progressing   Goal Outcome Evaluation:    Plan of Care Reviewed With: patient               Nursing Assessment    Psychosis (H) [F29]    Admit Date: 1/26/2022    Length of Stay: 192    Patient evaluation continues. Assessed mood,anxiety,thoughts and behavior. Patient  progressing towards goals. Pt has more energy and affect is less restricted. Out for meals more.Patient is encouraged to participate in groups and assisted to develop healthy coping skills.  Patient admits  auditory hallucinations. /72 (BP Location: Right arm)   Pulse 65   Temp 97.6  F (36.4  C) (Temporal)   Resp 16   Ht 1.778 m (5' 10\")   Wt 78 kg (172 lb)   SpO2 98%   BMI 24.68 kg/m      Mood: good    Patient reports depression none and reports anxiety none    Affect: flat blunted but brightens at times    Sleep: good    Appetite: good    SI: denies    HI: denies    SIB: denies      Medication Compliance yes    Group participation:bingo only    ADL's: assisted with ADL's    Fall risk interventions: proper foot wear,orthostatic B/P standby assist    Rinku Score Interventions:none    Discharge planning in process    Refer to daily team meeting notes for individualized plan of care. Nursing will continue to assess.    *Scale is 1-10 and 10 is the worst.         "

## 2022-08-06 NOTE — PLAN OF CARE
"Goal Outcome Evaluation:    Plan of Care Reviewed With: patient     Pt isolative to bed. Eye contact appropriate. Affect flat. Appears calm. Declined to come to dining room for dinner stating \"I'm tired.\" Impoverished thought and speech. One-word responses. Denies all MH symptoms. Reports feeling \"OK\". Appetite good. Hygiene good. Med-compliant. Continue with current treatment plan and recommendations. Continue to monitor and reassess symptoms. Monitor response to medications. Monitor progress towards treatment goals. Encourage groups and participation.          "

## 2022-08-06 NOTE — PLAN OF CARE
Goal Outcome Evaluation:  Patient with 10 hrs of sleep overnight.  Patient remained in his room sleeping for the entire shift. Encouraged to use the restroom q 2 hrs however declined. No episodes of incontinence.  No concerns reported.

## 2022-08-07 PROCEDURE — 124N000003 HC R&B MH SENIOR/ADOLESCENT

## 2022-08-07 PROCEDURE — 250N000013 HC RX MED GY IP 250 OP 250 PS 637: Performed by: PSYCHIATRY & NEUROLOGY

## 2022-08-07 RX ADMIN — MEMANTINE 10 MG: 10 TABLET ORAL at 09:08

## 2022-08-07 RX ADMIN — BUSPIRONE HYDROCHLORIDE 30 MG: 15 TABLET ORAL at 09:08

## 2022-08-07 RX ADMIN — CLOZAPINE 250 MG: 100 TABLET ORAL at 19:58

## 2022-08-07 RX ADMIN — MEMANTINE 10 MG: 10 TABLET ORAL at 19:59

## 2022-08-07 RX ADMIN — Medication 5 MG: at 19:58

## 2022-08-07 RX ADMIN — MIRTAZAPINE 15 MG: 15 TABLET, FILM COATED ORAL at 19:58

## 2022-08-07 RX ADMIN — GABAPENTIN 100 MG: 100 CAPSULE ORAL at 17:31

## 2022-08-07 RX ADMIN — SALMETEROL XINAFOATE 1 PUFF: 50 POWDER, METERED ORAL; RESPIRATORY (INHALATION) at 09:08

## 2022-08-07 RX ADMIN — BENZTROPINE MESYLATE 0.5 MG: 0.5 TABLET ORAL at 19:58

## 2022-08-07 RX ADMIN — BENZTROPINE MESYLATE 0.5 MG: 0.5 TABLET ORAL at 09:08

## 2022-08-07 RX ADMIN — ASPIRIN 81 MG: 81 TABLET, COATED ORAL at 09:08

## 2022-08-07 RX ADMIN — BUSPIRONE HYDROCHLORIDE 30 MG: 15 TABLET ORAL at 19:59

## 2022-08-07 RX ADMIN — LEVOTHYROXINE SODIUM 88 MCG: 0.09 TABLET ORAL at 09:08

## 2022-08-07 RX ADMIN — GABAPENTIN 100 MG: 100 CAPSULE ORAL at 09:08

## 2022-08-07 RX ADMIN — DONEPEZIL HYDROCHLORIDE 10 MG: 10 TABLET ORAL at 19:58

## 2022-08-07 RX ADMIN — SALMETEROL XINAFOATE 1 PUFF: 50 POWDER, METERED ORAL; RESPIRATORY (INHALATION) at 19:59

## 2022-08-07 RX ADMIN — GABAPENTIN 100 MG: 100 CAPSULE ORAL at 12:27

## 2022-08-07 RX ADMIN — MEGESTROL ACETATE 20 MG: 20 TABLET ORAL at 09:08

## 2022-08-07 ASSESSMENT — ACTIVITIES OF DAILY LIVING (ADL)
ADLS_ACUITY_SCORE: 76

## 2022-08-07 NOTE — PLAN OF CARE
Goal Outcome Evaluation:    Patient has remained in his room sleeping the entire shift.  No episodes of incontinence.  Encouraged to use the restroom q 2hrs.  9 hrs of sleep.

## 2022-08-07 NOTE — PLAN OF CARE
"Nursing Assessment    Psychosis (H) [F29]    Admit Date: 1/26/2022    Length of Stay: 193    Patient evaluation continues. Assessed mood,anxiety,thoughts and behavior. Patient is progressing towards goals. Patient is encouraged to participate in groups and assisted to develop healthy coping skills.  Patient denies  auditory  hallucinations this shift. /75   Pulse 109   Temp 98.1  F (36.7  C) (Temporal)   Resp 20   Ht 1.778 m (5' 10\")   Wt 77.3 kg (170 lb 6.7 oz)   SpO2 98%   BMI 24.45 kg/m      Mood: \"ok\"    Patient reports depression none and reports anxiety none    Affect: flat blunted    Sleep: good 9 hours last night    Appetite: good    SI: denies    HI: denies    SIB: denies      Medication Compliance yes    Group participation:no    ADL's: assisted with all cares    Fall risk interventions: proper foot wear, orthostatic B/p, standby assist    Rinku Score Interventions:none    Discharge planning in process    Refer to daily team meeting notes for individualized plan of care. Nursing will continue to assess.    *Scale is 1-10 and 10 is the worst.         Problem: Behavior Regulation Impairment (Psychotic Signs/Symptoms)  Goal: Improved Behavioral Control (Psychotic Signs/Symptoms)  Outcome: Met   Goal Outcome Evaluation:    Plan of Care Reviewed With: patient                 "

## 2022-08-08 PROCEDURE — 250N000013 HC RX MED GY IP 250 OP 250 PS 637: Performed by: PSYCHIATRY & NEUROLOGY

## 2022-08-08 PROCEDURE — 99231 SBSQ HOSP IP/OBS SF/LOW 25: CPT | Performed by: PSYCHIATRY & NEUROLOGY

## 2022-08-08 PROCEDURE — 124N000003 HC R&B MH SENIOR/ADOLESCENT

## 2022-08-08 RX ADMIN — DONEPEZIL HYDROCHLORIDE 10 MG: 10 TABLET ORAL at 19:45

## 2022-08-08 RX ADMIN — GABAPENTIN 100 MG: 100 CAPSULE ORAL at 12:18

## 2022-08-08 RX ADMIN — BUSPIRONE HYDROCHLORIDE 30 MG: 15 TABLET ORAL at 19:45

## 2022-08-08 RX ADMIN — BENZTROPINE MESYLATE 0.5 MG: 0.5 TABLET ORAL at 19:45

## 2022-08-08 RX ADMIN — GABAPENTIN 100 MG: 100 CAPSULE ORAL at 17:39

## 2022-08-08 RX ADMIN — MEMANTINE 10 MG: 10 TABLET ORAL at 08:23

## 2022-08-08 RX ADMIN — ASPIRIN 81 MG: 81 TABLET, COATED ORAL at 08:22

## 2022-08-08 RX ADMIN — BENZTROPINE MESYLATE 0.5 MG: 0.5 TABLET ORAL at 08:23

## 2022-08-08 RX ADMIN — GABAPENTIN 100 MG: 100 CAPSULE ORAL at 08:23

## 2022-08-08 RX ADMIN — MEGESTROL ACETATE 20 MG: 20 TABLET ORAL at 08:22

## 2022-08-08 RX ADMIN — CLOZAPINE 250 MG: 100 TABLET ORAL at 19:45

## 2022-08-08 RX ADMIN — BUSPIRONE HYDROCHLORIDE 30 MG: 15 TABLET ORAL at 08:23

## 2022-08-08 RX ADMIN — MEMANTINE 10 MG: 10 TABLET ORAL at 19:45

## 2022-08-08 RX ADMIN — SALMETEROL XINAFOATE 1 PUFF: 50 POWDER, METERED ORAL; RESPIRATORY (INHALATION) at 08:23

## 2022-08-08 RX ADMIN — SALMETEROL XINAFOATE 1 PUFF: 50 POWDER, METERED ORAL; RESPIRATORY (INHALATION) at 19:46

## 2022-08-08 RX ADMIN — MIRTAZAPINE 15 MG: 15 TABLET, FILM COATED ORAL at 19:45

## 2022-08-08 RX ADMIN — Medication 5 MG: at 19:45

## 2022-08-08 RX ADMIN — LEVOTHYROXINE SODIUM 88 MCG: 0.09 TABLET ORAL at 08:23

## 2022-08-08 ASSESSMENT — ACTIVITIES OF DAILY LIVING (ADL)
ADLS_ACUITY_SCORE: 76

## 2022-08-08 NOTE — PLAN OF CARE
"Goal Outcome Evaluation:    Plan of Care Reviewed With: patient        Pt isolative to bed. Eye contact appropriate. Affect flat. Appears calm. Initially declined to come to dining room for dinner stating \"I'm tired\" but did come out with further prompting. Unsteady when first rising from bed. SBA provided while ambulating to dining room. Impoverished thought and speech. One-word responses. Insight impaired. Appetite good. Hygiene neglected but adequate. Med-compliant. Continue with current treatment plan and recommendations. Continue to monitor and reassess symptoms. Monitor response to medications. Monitor progress towards treatment goals. Encourage groups and participation.          "

## 2022-08-08 NOTE — PROGRESS NOTES
"PSYCHIATRY  PROGRESS NOTE     DATE OF SERVICE   08/08/2022       CHIEF COMPLAINT   Patient was admitted due to inability to safely care for himself and psychosis.       SUBJECTIVE   Nursing reports:   Patient evaluation continues. Assessed mood,anxiety,thoughts and behavior. Patient is  progressing towards goals. Patient is encouraged to participate in groups and assisted to develop healthy coping skills.  Patient admits to auditory or hallucinations. /79   Pulse 94   Temp 97.9  F (36.6  C) (Temporal)   Resp 20   Ht 1.778 m (5' 10\")   Wt 77.3 kg (170 lb 6.7 oz)   SpO2 99%   BMI 24.45 kg/m      Patient has been discussed in detail with the  during team meeting.  We continue with no updates about the guardianship process.     OBJECTIVE   Patient was seen and evaluated at bedside by himself, this was a face-to-face evaluation.  Patient reported that he is good and he denies having any psychiatric symptoms.  Patient has continued to actively hallucinating and said that he has been talking with his mother and his sister.  Other than that the patient stated that he is doing fine.  He is preoccupied with food and was constantly asking when lunch was going to arrive.  Denies having any thoughts of harming himself and has been able to contract for safety.       MEDICATIONS   Medications:  Scheduled Meds:    aspirin  81 mg Oral Daily     benztropine  0.5 mg Oral BID     busPIRone HCl  30 mg Oral BID     cloZAPine  250 mg Oral At Bedtime     donepezil  10 mg Oral At Bedtime     gabapentin  100 mg Oral TID w/meals     levothyroxine  88 mcg Oral Daily     megestrol  20 mg Oral Daily     melatonin  5 mg Oral At Bedtime     memantine  10 mg Oral BID     mirtazapine  15 mg Oral At Bedtime     salmeterol  1 puff Inhalation BID     Continuous Infusions:  PRN Meds:.albuterol, alum & mag hydroxide-simethicone, benzocaine-menthol, hydrOXYzine, nicotine, nystatin, polyethylene glycol, polyethylene " "glycol-propylene glycol PF, senna-docusate    Medication adherence issues: MS Med Adherence Y/N: Yes, Hospitalization  Medication side effects: MEDICATION SIDE EFFECTS: no side effects reported  Benefit: Yes / No: Yes       ROS   A comprehensive review of systems was negative.       MENTAL STATUS EXAM   Vitals: /79   Pulse 94   Temp 97.9  F (36.6  C) (Temporal)   Resp 20   Ht 1.778 m (5' 10\")   Wt 77.3 kg (170 lb 6.7 oz)   SpO2 99%   BMI 24.45 kg/m       Same as last visit  Appearance:  No apparent distress  Mood: \"Feeling all right\"  Affect: Restricted  Suicidal Ideation: Denies  Homicidal Ideation: Denies  Thought process: Disorganized  Thought content: Remains confused and delusional and hallucinating at times.    Fund of Knowledge: Below average  Attention/Concentration: Poor  Language ability: Significantly impaired  Memory: Global memory impairment  Insight:  Poor.  Judgement: Poor  Orientation: Only to person  Psychomotor Behavior: slowed    Muscle Strength and Tone: MuscleStrength: Normal and Atrophy  Gait and Station: Not evaluated       LABS   personally reviewed.     No results found for: PHENYTOIN, PHENOBARB, VALPROATE, CBMZ       DIAGNOSIS   Principal Problem:    Major neurocognitive disorder, due to multiple etiologies, with behavioral disturbance, severe (H)    Active Problem List:  Patient Active Problem List   Diagnosis     TBI with aggressive behavior     HTN (hypertension)     COPD (chronic obstructive pulmonary disease) (H)     Dementia with behavioral disturbance (H)     Chronic schizophrenia (H)     Smoker     Agitation     Behavior disturbance     Psychosis (H)     Major neurocognitive disorder, due to multiple etiologies, with behavioral disturbance, severe (H)     Paranoid schizophrenia, chronic condition with acute exacerbation (H)     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 Ongoing education given regarding diagnostic and treatment options with risks, benefits and alternatives and adequate verbalization of understanding.  2.  Medications       BuSpar 30 mg 2 times daily       Cogentin 0.5 mg 2 times a day       Clozaril 250 mg at bedtime       Aricept 10 mg at bedtime       Gabapentin 100 mg 3 times a day       Namenda 10 mg 2 times a day       Remeron 15 mg at bedtime       melatonin 5 mg at bedtime  3.  Medical team following the patient   4.   coordinating a safe discharge plan with the patient.    CBC today is within normal limits.  Risk Assessment: [facility] RISK ASSESSMENT: Patient able to contract for safety    Coordination of Care:   Treatment Plan reviewed and physician signed, Care discussed with Care/Treatment Team Members, Chart reviewed and Patient seen      Re-Certification I certify that the inpatient psychiatric facility services furnished since the previous certification were, and continue to be, medically necessary for, either, treatment which could reasonably be expected to improve the patient s condition or diagnostic study and that the hospital records indicate that the services furnished were, either, intensive treatment services, admission and related services necessary for diagnostic study, or equivalent services.     I certify that the patient continues to need, on a daily basis, active treatment furnished directly by or requiring the supervision of inpatient psychiatric facility personnel.   I estimate 14 days of hospitalization is necessary for proper treatment of the patient. My plans for post-hospital care for this patient are  TBD     Ginger Dubon MD    -     08/08/2022  -     3:41 PM    Total time 15 minutes with > 50%spent on coordination of cares and psycho-education.    This note was created with help of Dragon dictation system. Grammatical / typing errors are  not intentional.    Ginger Dubon MD

## 2022-08-08 NOTE — PLAN OF CARE
"  Problem: Behavior Regulation Impairment (Psychotic Signs/Symptoms)  Goal: Improved Behavioral Control (Psychotic Signs/Symptoms)  Outcome: Ongoing, Progressing   Goal Outcome Evaluation:    Plan of Care Reviewed With: patient      Nursing Assessment    Psychosis (H) [F29]    Admit Date: 1/26/2022    Length of Stay: 194    Patient evaluation continues. Assessed mood,anxiety,thoughts and behavior. Patient is  progressing towards goals. Patient is encouraged to participate in groups and assisted to develop healthy coping skills.  Patient admits to auditory or hallucinations. /79   Pulse 94   Temp 97.9  F (36.6  C) (Temporal)   Resp 20   Ht 1.778 m (5' 10\")   Wt 77.3 kg (170 lb 6.7 oz)   SpO2 99%   BMI 24.45 kg/m      Mood: good    Patient reports depression none and reports anxiety none    Affect:full range today    Sleep: 10 hours last night    Appetite: good    SI: denies    HI: denies    SIB: denies      Medication Compliance yes    Group participation:no    ADL's: assisted with all cares    Fall risk interventions: proper foot wear, orthostatic B/P stand by assist    Rinku Score Interventions:none    Discharge planning in process    Refer to daily team meeting notes for individualized plan of care. Nursing will continue to assess.    *Scale is 1-10 and 10 is the worst.                  "

## 2022-08-08 NOTE — PLAN OF CARE
Problem: Sleep Disturbance (Psychotic Signs/Symptoms)  Goal: Improved Sleep (Psychotic Signs/Symptoms)  Outcome: Ongoing, Progressing   Goal Outcome Evaluation:  Patient has remained in his room sleeping for the entire shift.  Up to the restroom X1 overnight.  No episodes of incontinence.  No concerns reported.  9 hrs of sleep.

## 2022-08-09 PROCEDURE — 250N000013 HC RX MED GY IP 250 OP 250 PS 637: Performed by: PSYCHIATRY & NEUROLOGY

## 2022-08-09 PROCEDURE — 124N000003 HC R&B MH SENIOR/ADOLESCENT

## 2022-08-09 RX ADMIN — BUSPIRONE HYDROCHLORIDE 30 MG: 15 TABLET ORAL at 08:40

## 2022-08-09 RX ADMIN — GABAPENTIN 100 MG: 100 CAPSULE ORAL at 08:40

## 2022-08-09 RX ADMIN — BENZTROPINE MESYLATE 0.5 MG: 0.5 TABLET ORAL at 19:54

## 2022-08-09 RX ADMIN — MIRTAZAPINE 15 MG: 15 TABLET, FILM COATED ORAL at 19:54

## 2022-08-09 RX ADMIN — MEMANTINE 10 MG: 10 TABLET ORAL at 08:40

## 2022-08-09 RX ADMIN — GABAPENTIN 100 MG: 100 CAPSULE ORAL at 17:33

## 2022-08-09 RX ADMIN — LEVOTHYROXINE SODIUM 88 MCG: 0.09 TABLET ORAL at 08:40

## 2022-08-09 RX ADMIN — SALMETEROL XINAFOATE 1 PUFF: 50 POWDER, METERED ORAL; RESPIRATORY (INHALATION) at 08:36

## 2022-08-09 RX ADMIN — MEMANTINE 10 MG: 10 TABLET ORAL at 19:54

## 2022-08-09 RX ADMIN — BENZTROPINE MESYLATE 0.5 MG: 0.5 TABLET ORAL at 08:40

## 2022-08-09 RX ADMIN — CLOZAPINE 250 MG: 100 TABLET ORAL at 19:54

## 2022-08-09 RX ADMIN — GABAPENTIN 100 MG: 100 CAPSULE ORAL at 12:19

## 2022-08-09 RX ADMIN — MEGESTROL ACETATE 20 MG: 20 TABLET ORAL at 08:40

## 2022-08-09 RX ADMIN — Medication 5 MG: at 19:54

## 2022-08-09 RX ADMIN — DONEPEZIL HYDROCHLORIDE 10 MG: 10 TABLET ORAL at 19:54

## 2022-08-09 RX ADMIN — SALMETEROL XINAFOATE 1 PUFF: 50 POWDER, METERED ORAL; RESPIRATORY (INHALATION) at 19:54

## 2022-08-09 RX ADMIN — ASPIRIN 81 MG: 81 TABLET, COATED ORAL at 08:40

## 2022-08-09 RX ADMIN — BUSPIRONE HYDROCHLORIDE 30 MG: 15 TABLET ORAL at 19:54

## 2022-08-09 ASSESSMENT — ACTIVITIES OF DAILY LIVING (ADL)
ADLS_ACUITY_SCORE: 76

## 2022-08-09 NOTE — PLAN OF CARE
Problem: Behavioral Health Plan of Care  Goal: Adheres to Safety Considerations for Self and Others  Outcome: Ongoing, Progressing  Intervention: Develop and Maintain Individualized Safety Plan  Recent Flowsheet Documentation  Taken 8/8/2022 2114 by Dann Maldonado, RN  Safety Measures:    environmental rounds completed    safety rounds completed    suicide check-in completed       Plan of Care Reviewed With: patient    Pt calm, cooperative, denies SI/SIB, no anxiety/depression. Affect flat, voice soft, intermittent eye contact. Wtihdraw and isolative  Pt denies any pain, reported tired and wants his food tray brought to his room. Appetite fair, ate 50% of his food. Pt medication compliant.

## 2022-08-09 NOTE — PROGRESS NOTES
Pt appeared to be sleeping comfortably all night. Total sleep hours 9. No concerns reported or noted during this shift.

## 2022-08-09 NOTE — PLAN OF CARE
Goal Outcome Evaluation:    Plan of Care Reviewed With: patient     Patient is calm, medication compliant. Isolative and withdrawn, not social with peers, did not attend group or participate in programming. Visible in milieu for meals. Fair appetite, ate 50% of meals.

## 2022-08-09 NOTE — PROGRESS NOTES
Patient continues to be at his baseline and no new behaviors have been reported.  Patient stated that he is doing well and denies any problems with his medications.    Ginger Dubon MD

## 2022-08-10 PROCEDURE — 250N000013 HC RX MED GY IP 250 OP 250 PS 637: Performed by: PSYCHIATRY & NEUROLOGY

## 2022-08-10 PROCEDURE — 124N000003 HC R&B MH SENIOR/ADOLESCENT

## 2022-08-10 PROCEDURE — 99231 SBSQ HOSP IP/OBS SF/LOW 25: CPT | Performed by: PSYCHIATRY & NEUROLOGY

## 2022-08-10 RX ADMIN — MEMANTINE 10 MG: 10 TABLET ORAL at 08:20

## 2022-08-10 RX ADMIN — DONEPEZIL HYDROCHLORIDE 10 MG: 10 TABLET ORAL at 20:51

## 2022-08-10 RX ADMIN — MIRTAZAPINE 15 MG: 15 TABLET, FILM COATED ORAL at 20:51

## 2022-08-10 RX ADMIN — GABAPENTIN 100 MG: 100 CAPSULE ORAL at 17:23

## 2022-08-10 RX ADMIN — GABAPENTIN 100 MG: 100 CAPSULE ORAL at 13:02

## 2022-08-10 RX ADMIN — ASPIRIN 81 MG: 81 TABLET, COATED ORAL at 08:20

## 2022-08-10 RX ADMIN — MEMANTINE 10 MG: 10 TABLET ORAL at 20:50

## 2022-08-10 RX ADMIN — SALMETEROL XINAFOATE 1 PUFF: 50 POWDER, METERED ORAL; RESPIRATORY (INHALATION) at 20:50

## 2022-08-10 RX ADMIN — GABAPENTIN 100 MG: 100 CAPSULE ORAL at 08:20

## 2022-08-10 RX ADMIN — BUSPIRONE HYDROCHLORIDE 30 MG: 15 TABLET ORAL at 08:20

## 2022-08-10 RX ADMIN — MEGESTROL ACETATE 20 MG: 20 TABLET ORAL at 08:20

## 2022-08-10 RX ADMIN — BENZTROPINE MESYLATE 0.5 MG: 0.5 TABLET ORAL at 08:20

## 2022-08-10 RX ADMIN — LEVOTHYROXINE SODIUM 88 MCG: 0.09 TABLET ORAL at 08:20

## 2022-08-10 RX ADMIN — CLOZAPINE 250 MG: 100 TABLET ORAL at 20:50

## 2022-08-10 RX ADMIN — BENZTROPINE MESYLATE 0.5 MG: 0.5 TABLET ORAL at 20:51

## 2022-08-10 RX ADMIN — Medication 5 MG: at 20:51

## 2022-08-10 RX ADMIN — BUSPIRONE HYDROCHLORIDE 30 MG: 15 TABLET ORAL at 20:51

## 2022-08-10 RX ADMIN — SALMETEROL XINAFOATE 1 PUFF: 50 POWDER, METERED ORAL; RESPIRATORY (INHALATION) at 08:20

## 2022-08-10 ASSESSMENT — ACTIVITIES OF DAILY LIVING (ADL)
ADLS_ACUITY_SCORE: 76

## 2022-08-10 NOTE — PLAN OF CARE
"Goal Outcome Evaluation:    Plan of Care Reviewed With: patient        Pt isolative to bed. Eye contact appropriate. Affect flat. Impoverished thought and speech. One-word responses. Reported feeling \"all right' this evening. SBA provided with ambulating to dining room for dinner. Gait unsteady when first rising from lying position but more steady after beginning to ambulate. Ate approx 75% of dinner. Med-compliant. Cooperative. Pleasant. Continue with current treatment plan and recommendations. Continue to monitor and reassess symptoms. Monitor response to medications. Monitor progress towards treatment goals. Encourage groups and participation.          "

## 2022-08-10 NOTE — PLAN OF CARE
Problem: Sleep Disturbance (Psychotic Signs/Symptoms)  Goal: Improved Sleep (Psychotic Signs/Symptoms)  Outcome: Ongoing, Progressing   Goal Outcome Evaluation:    Patient slept for a total of 9.5 hours. No behavioral issues noted the whole night.

## 2022-08-10 NOTE — PROGRESS NOTES
"PSYCHIATRY  PROGRESS NOTE     DATE OF SERVICE   08/10/2022       CHIEF COMPLAINT   Patient was admitted due to inability to safely care for himself and psychosis.       SUBJECTIVE   Nursing reports:   Patient is calm, medication compliant, denies pain. reports AH, \"hearing many voices\" \"my family\", the voices are not negative. Patient also has delusions about mansions all over the [place] that he could discharge to. Patient is isolative and withdrawn, poor ADLs, refused shower this shift. Patient was visible for meals, per psych associates patient was unstable on feet  at lunch time when attempting to walk to [facility], wheelchair was utilized. Patient did use the wheelchair this morning as well but was offered by psych associate versus patient stating a need for it, patient did report being agreeable to coming out to breakfast with a wheelchair. Soft BP systolic in 90s this afternoon, fluids encouraged IM notified.    Patient has been discussed in detail with the  during team meeting.  We continue with no updates about the guardianship process.     OBJECTIVE   Patient was seen and evaluated at bedside by himself, this was a face-to-face evaluation.  Patient continues to be psychiatrically stable and at his baseline.  He remains delusional and hallucinating at times.  Denies having any side effects from his medications.       MEDICATIONS   Medications:  Scheduled Meds:    aspirin  81 mg Oral Daily     benztropine  0.5 mg Oral BID     busPIRone HCl  30 mg Oral BID     cloZAPine  250 mg Oral At Bedtime     donepezil  10 mg Oral At Bedtime     gabapentin  100 mg Oral TID w/meals     levothyroxine  88 mcg Oral Daily     megestrol  20 mg Oral Daily     melatonin  5 mg Oral At Bedtime     memantine  10 mg Oral BID     mirtazapine  15 mg Oral At Bedtime     salmeterol  1 puff Inhalation BID     Continuous Infusions:  PRN Meds:.albuterol, alum & mag hydroxide-simethicone, benzocaine-menthol, hydrOXYzine, nicotine, " "nystatin, polyethylene glycol, polyethylene glycol-propylene glycol PF, senna-docusate    Medication adherence issues: MS Med Adherence Y/N: Yes, Hospitalization  Medication side effects: MEDICATION SIDE EFFECTS: no side effects reported  Benefit: Yes / No: Yes       ROS   A comprehensive review of systems was negative.       MENTAL STATUS EXAM   Vitals: /75   Pulse 105   Temp 97.6  F (36.4  C) (Temporal)   Resp 18   Ht 1.778 m (5' 10\")   Wt 77.3 kg (170 lb 6.7 oz)   SpO2 100%   BMI 24.45 kg/m         Appearance:  No apparent distress  Mood: \"Feeling tired\"  Affect: Restricted  Suicidal Ideation: Denies  Homicidal Ideation: Denies  Thought process: Disorganized  Thought content: Remains confused and delusional and hallucinating at times.    Fund of Knowledge: Below average  Attention/Concentration: Poor  Language ability: Significantly impaired  Memory: Global memory impairment  Insight:  Poor.  Judgement: Poor  Orientation: Only to person  Psychomotor Behavior: slowed    Muscle Strength and Tone: MuscleStrength: Normal and Atrophy  Gait and Station: Not evaluated       LABS   personally reviewed.     No results found for: PHENYTOIN, PHENOBARB, VALPROATE, CBMZ       DIAGNOSIS   Principal Problem:    Major neurocognitive disorder, due to multiple etiologies, with behavioral disturbance, severe (H)    Active Problem List:  Patient Active Problem List   Diagnosis     TBI with aggressive behavior     HTN (hypertension)     COPD (chronic obstructive pulmonary disease) (H)     Dementia with behavioral disturbance (H)     Chronic schizophrenia (H)     Smoker     Agitation     Behavior disturbance     Psychosis (H)     Major neurocognitive disorder, due to multiple etiologies, with behavioral disturbance, severe (H)     Paranoid schizophrenia, chronic condition with acute exacerbation (H)     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 Ongoing education given regarding diagnostic and treatment options with risks, benefits and alternatives and adequate verbalization of understanding.  2.  Medications       BuSpar 30 mg 2 times daily       Cogentin 0.5 mg 2 times a day       Clozaril 250 mg at bedtime       Aricept 10 mg at bedtime       Gabapentin 100 mg 3 times a day       Namenda 10 mg 2 times a day       Remeron 15 mg at bedtime       melatonin 5 mg at bedtime  3.  Medical team following the patient   4.   coordinating a safe discharge plan with the patient.    CBC today is within normal limits.  Risk Assessment: [facility] RISK ASSESSMENT: Patient able to contract for safety    Coordination of Care:   Treatment Plan reviewed and physician signed, Care discussed with Care/Treatment Team Members, Chart reviewed and Patient seen      Re-Certification I certify that the inpatient psychiatric facility services furnished since the previous certification were, and continue to be, medically necessary for, either, treatment which could reasonably be expected to improve the patient s condition or diagnostic study and that the hospital records indicate that the services furnished were, either, intensive treatment services, admission and related services necessary for diagnostic study, or equivalent services.     I certify that the patient continues to need, on a daily basis, active treatment furnished directly by or requiring the supervision of inpatient psychiatric facility personnel.   I estimate 14 days of hospitalization is necessary for proper treatment of the patient. My plans for post-hospital care for this patient are  TBD     Ginger Dubon MD    -     08/10/2022  -     4:16 PM    Total time 15 minutes with > 50%spent on coordination of cares and psycho-education.    This note was created with help of Dragon dictation system.  Grammatical / typing errors are not intentional.    Ginger Dubon MD

## 2022-08-10 NOTE — PLAN OF CARE
"  Problem: Cognitive Impairment (Psychotic Signs/Symptoms)  Goal: Optimal Cognitive Function (Psychotic Signs/Symptoms)  Outcome: Ongoing, Not Progressing  Intervention: Support and Promote Cognitive Ability  Recent Flowsheet Documentation  Taken 8/10/2022 1026 by Juarez Murphy RN  Trust Relationship/Rapport:    care explained    choices provided    empathic listening provided    questions encouraged     Problem: Behavioral Health Plan of Care  Goal: Plan of Care Review  Outcome: Ongoing, Progressing  Flowsheets (Taken 8/10/2022 1026)  Plan of Care Reviewed With: patient  Patient Agreement with Plan of Care: agrees   Goal Outcome Evaluation:    Plan of Care Reviewed With: patient     Patient is calm, medication compliant, denies pain. reports AH, \"hearing many voices\" \"my family\", the voices are not negative. Patient also has delusions about mansions all over the world that he could discharge to. Patient is isolative and withdrawn, poor ADLs, refused shower this shift. Patient was visible for meals, per psyche associates patient was unstable on feet  at lunch time when attempting to walk to [facility], wheelchair was utilized. Patient did use the wheelchair this morning as well but was offered by psyche associate versus patient stating a need for it, patient did report being agreeable to coming out to breakfast with a wheelchair. Soft BP systolic in 90s this afternoon, fluids encouraged IM notified.      "

## 2022-08-11 LAB
ALBUMIN SERPL-MCNC: 3.4 G/DL (ref 3.4–5)
ALP SERPL-CCNC: 72 U/L (ref 40–150)
ALT SERPL W P-5'-P-CCNC: 106 U/L (ref 0–70)
ANION GAP SERPL CALCULATED.3IONS-SCNC: 5 MMOL/L (ref 3–14)
AST SERPL W P-5'-P-CCNC: 155 U/L (ref 0–45)
BILIRUB SERPL-MCNC: 0.4 MG/DL (ref 0.2–1.3)
BUN SERPL-MCNC: 17 MG/DL (ref 7–30)
CALCIUM SERPL-MCNC: 9.2 MG/DL (ref 8.5–10.1)
CHLORIDE BLD-SCNC: 117 MMOL/L (ref 94–109)
CO2 SERPL-SCNC: 22 MMOL/L (ref 20–32)
CREAT SERPL-MCNC: 0.69 MG/DL (ref 0.66–1.25)
GFR SERPL CREATININE-BSD FRML MDRD: >90 ML/MIN/1.73M2
GLUCOSE BLD-MCNC: 98 MG/DL (ref 70–99)
POTASSIUM BLD-SCNC: 3.5 MMOL/L (ref 3.4–5.3)
PROT SERPL-MCNC: 7.3 G/DL (ref 6.8–8.8)
SODIUM SERPL-SCNC: 144 MMOL/L (ref 133–144)
TSH SERPL DL<=0.005 MIU/L-ACNC: 1.87 MU/L (ref 0.4–4)

## 2022-08-11 PROCEDURE — 124N000003 HC R&B MH SENIOR/ADOLESCENT

## 2022-08-11 PROCEDURE — 99231 SBSQ HOSP IP/OBS SF/LOW 25: CPT | Performed by: PSYCHIATRY & NEUROLOGY

## 2022-08-11 PROCEDURE — 250N000013 HC RX MED GY IP 250 OP 250 PS 637: Performed by: PSYCHIATRY & NEUROLOGY

## 2022-08-11 PROCEDURE — 84443 ASSAY THYROID STIM HORMONE: CPT | Performed by: PSYCHIATRY & NEUROLOGY

## 2022-08-11 PROCEDURE — 36415 COLL VENOUS BLD VENIPUNCTURE: CPT | Performed by: PSYCHIATRY & NEUROLOGY

## 2022-08-11 PROCEDURE — 80053 COMPREHEN METABOLIC PANEL: CPT | Performed by: PSYCHIATRY & NEUROLOGY

## 2022-08-11 RX ADMIN — MIRTAZAPINE 15 MG: 15 TABLET, FILM COATED ORAL at 20:46

## 2022-08-11 RX ADMIN — BENZTROPINE MESYLATE 0.5 MG: 0.5 TABLET ORAL at 20:47

## 2022-08-11 RX ADMIN — SALMETEROL XINAFOATE 1 PUFF: 50 POWDER, METERED ORAL; RESPIRATORY (INHALATION) at 08:50

## 2022-08-11 RX ADMIN — LEVOTHYROXINE SODIUM 88 MCG: 0.09 TABLET ORAL at 08:49

## 2022-08-11 RX ADMIN — GABAPENTIN 100 MG: 100 CAPSULE ORAL at 08:49

## 2022-08-11 RX ADMIN — MEMANTINE 10 MG: 10 TABLET ORAL at 20:47

## 2022-08-11 RX ADMIN — ASPIRIN 81 MG: 81 TABLET, COATED ORAL at 08:48

## 2022-08-11 RX ADMIN — GABAPENTIN 100 MG: 100 CAPSULE ORAL at 12:28

## 2022-08-11 RX ADMIN — BUSPIRONE HYDROCHLORIDE 30 MG: 15 TABLET ORAL at 20:46

## 2022-08-11 RX ADMIN — BUSPIRONE HYDROCHLORIDE 30 MG: 15 TABLET ORAL at 08:49

## 2022-08-11 RX ADMIN — SALMETEROL XINAFOATE 1 PUFF: 50 POWDER, METERED ORAL; RESPIRATORY (INHALATION) at 20:46

## 2022-08-11 RX ADMIN — CLOZAPINE 250 MG: 100 TABLET ORAL at 20:46

## 2022-08-11 RX ADMIN — DONEPEZIL HYDROCHLORIDE 10 MG: 10 TABLET ORAL at 20:46

## 2022-08-11 RX ADMIN — BENZTROPINE MESYLATE 0.5 MG: 0.5 TABLET ORAL at 08:49

## 2022-08-11 RX ADMIN — MEMANTINE 10 MG: 10 TABLET ORAL at 08:49

## 2022-08-11 RX ADMIN — GABAPENTIN 100 MG: 100 CAPSULE ORAL at 17:32

## 2022-08-11 RX ADMIN — MEGESTROL ACETATE 20 MG: 20 TABLET ORAL at 08:49

## 2022-08-11 RX ADMIN — Medication 5 MG: at 20:47

## 2022-08-11 ASSESSMENT — ACTIVITIES OF DAILY LIVING (ADL)
ADLS_ACUITY_SCORE: 76

## 2022-08-11 NOTE — PLAN OF CARE
Goal Outcome Evaluation:    Plan of Care Reviewed With: patient   Patient stayed in room through the shift sleeping.  Flat affect, denies SI/SIB, depression/anxiety.  Patient had breakfast and lunch in room with fair appetite, but drinking juices and water.  No aggressive behavior noted this shift.  Will continue to monitor closely.

## 2022-08-11 NOTE — PROGRESS NOTES
"PSYCHIATRY  PROGRESS NOTE     DATE OF SERVICE   08/11/2022       CHIEF COMPLAINT   Patient was admitted due to inability to safely care for himself and psychosis.       SUBJECTIVE   Nursing reports:   Pt isolative to bed. Eye contact appropriate. Affect flat. Impoverished thought and speech. Brief verbal responses. Reported feeling \"all right' this evening. SBA provided with ambulating to dining room for dinner. Gait steady. Ate approx 25% of dinner, ate ice cream for dessert and drank several containers of juice. Reported he was \"not very hungry.\" Med-compliant. Cooperative. Pleasant. Continue with current treatment plan and recommendations. Continue to monitor and reassess symptoms. Monitor response to medications. Monitor progress towards treatment goals. Encourage groups and participation.    Patient has been discussed in detail with the  during team meeting.  We continue with no updates about the guardianship process.     OBJECTIVE   Patient was seen and evaluated at bedside by himself, this was a face-to-face evaluation.  Patient continues to be at his baseline.  He remains delusional thinking that he has several houses and a lot of money.  Today patient ask when he will be able to go home.  I discussed with the patient that at this time we have not received any updates from the court about the guardianship.  Patient replied \"this is bullshit I do not need a guardian\".  Patient then proceeded to ask for food and a cigarette.  I reminded the patient that at this time he is not allowed to smoke but lunch will arrived within an hour.  Patient was able to accept this information without getting agitated and went back to sleep.       MEDICATIONS   Medications:  Scheduled Meds:    aspirin  81 mg Oral Daily     benztropine  0.5 mg Oral BID     busPIRone HCl  30 mg Oral BID     cloZAPine  250 mg Oral At Bedtime     donepezil  10 mg Oral At Bedtime     gabapentin  100 mg Oral TID w/meals     levothyroxine  " "88 mcg Oral Daily     megestrol  20 mg Oral Daily     melatonin  5 mg Oral At Bedtime     memantine  10 mg Oral BID     mirtazapine  15 mg Oral At Bedtime     salmeterol  1 puff Inhalation BID     Continuous Infusions:  PRN Meds:.albuterol, alum & mag hydroxide-simethicone, benzocaine-menthol, hydrOXYzine, nicotine, nystatin, polyethylene glycol, polyethylene glycol-propylene glycol PF, senna-docusate    Medication adherence issues: MS Med Adherence Y/N: Yes, Hospitalization  Medication side effects: MEDICATION SIDE EFFECTS: no side effects reported  Benefit: Yes / No: Yes       ROS   A comprehensive review of systems was negative.       MENTAL STATUS EXAM   Vitals: BP 95/65 (BP Location: Right arm)   Pulse 91   Temp 97.3  F (36.3  C) (Temporal)   Resp 16   Ht 1.778 m (5' 10\")   Wt 77.3 kg (170 lb 6.7 oz)   SpO2 97%   BMI 24.45 kg/m       Same as last visit  Appearance:  No apparent distress  Mood: \"Feeling tired\"  Affect: Restricted  Suicidal Ideation: Denies  Homicidal Ideation: Denies  Thought process: Disorganized  Thought content: Remains confused and delusional and hallucinating at times.    Fund of Knowledge: Below average  Attention/Concentration: Poor  Language ability: Significantly impaired  Memory: Global memory impairment  Insight:  Poor.  Judgement: Poor  Orientation: Only to person  Psychomotor Behavior: slowed    Muscle Strength and Tone: MuscleStrength: Normal and Atrophy  Gait and Station: Not evaluated       LABS   personally reviewed.     No results found for: PHENYTOIN, PHENOBARB, VALPROATE, CBMZ       DIAGNOSIS   Principal Problem:    Major neurocognitive disorder, due to multiple etiologies, with behavioral disturbance, severe (H)    Active Problem List:  Patient Active Problem List   Diagnosis     TBI with aggressive behavior     HTN (hypertension)     COPD (chronic obstructive pulmonary disease) (H)     Dementia with behavioral disturbance (H)     Chronic schizophrenia (H)     Smoker "     Agitation     Behavior disturbance     Psychosis (H)     Major neurocognitive disorder, due to multiple etiologies, with behavioral disturbance, severe (H)     Paranoid schizophrenia, chronic condition with acute exacerbation (H)     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 Ongoing education given regarding diagnostic and treatment options with risks, benefits and alternatives and adequate verbalization of understanding.  2.  Medications       BuSpar 30 mg 2 times daily       Cogentin 0.5 mg 2 times a day       Clozaril 250 mg at bedtime       Aricept 10 mg at bedtime       Gabapentin 100 mg 3 times a day       Namenda 10 mg 2 times a day       Remeron 15 mg at bedtime       melatonin 5 mg at bedtime  3.  Medical team following the patient   4.   coordinating a safe discharge plan with the patient.  5.  Labs have been ordered, liver functions remained stable consistent with the hep C diagnosis.      Risk Assessment: [facility] RISK ASSESSMENT: Patient able to contract for safety    Coordination of Care:   Treatment Plan reviewed and physician signed, Care discussed with Care/Treatment Team Members, Chart reviewed and Patient seen      Re-Certification I certify that the inpatient psychiatric facility services furnished since the previous certification were, and continue to be, medically necessary for, either, treatment which could reasonably be expected to improve the patient s condition or diagnostic study and that the hospital records indicate that the services furnished were, either, intensive treatment services, admission and related services necessary for diagnostic study, or equivalent services.     I certify that the patient continues to need, on a daily basis, active treatment furnished directly by or requiring the  supervision of inpatient psychiatric facility personnel.   I estimate 14 days of hospitalization is necessary for proper treatment of the patient. My plans for post-hospital care for this patient are  TBD     Ginger Dubon MD    -     08/11/2022  -     1:31 PM    Total time 15 minutes with > 50%spent on coordination of cares and psycho-education.    This note was created with help of Dragon dictation system. Grammatical / typing errors are not intentional.    Ginger Dubon MD

## 2022-08-11 NOTE — PLAN OF CARE
Problem: Sleep Disturbance  Goal: Adequate Sleep/Rest  Outcome: Ongoing, Progressing   Goal Outcome Evaluation:    Patient slept a total of 8.75 hours without any interruptions. No behavioral issues noted the whole shift.

## 2022-08-11 NOTE — PLAN OF CARE
"Goal Outcome Evaluation:    Plan of Care Reviewed With: patient        Pt isolative to bed. Eye contact appropriate. Affect flat. Impoverished thought and speech. Brief verbal responses. Reported feeling \"all right' this evening. SBA provided with ambulating to dining room for dinner. Gait steady. Ate approx 25% of dinner, ate ice cream for dessert and drank several containers of juice. Reported he was \"not very hungry.\" Med-compliant. Cooperative. Pleasant. Continue with current treatment plan and recommendations. Continue to monitor and reassess symptoms. Monitor response to medications. Monitor progress towards treatment goals. Encourage groups and participation.         "

## 2022-08-12 LAB
BASOPHILS # BLD AUTO: 0.1 10E3/UL (ref 0–0.2)
BASOPHILS NFR BLD AUTO: 2 %
EOSINOPHIL # BLD AUTO: 1.5 10E3/UL (ref 0–0.7)
EOSINOPHIL NFR BLD AUTO: 27 %
ERYTHROCYTE [DISTWIDTH] IN BLOOD BY AUTOMATED COUNT: 13.8 % (ref 10–15)
HCT VFR BLD AUTO: 42.5 % (ref 40–53)
HGB BLD-MCNC: 14.7 G/DL (ref 13.3–17.7)
IMM GRANULOCYTES # BLD: 0 10E3/UL
IMM GRANULOCYTES NFR BLD: 0 %
LYMPHOCYTES # BLD AUTO: 1.5 10E3/UL (ref 0.8–5.3)
LYMPHOCYTES NFR BLD AUTO: 27 %
MCH RBC QN AUTO: 31.7 PG (ref 26.5–33)
MCHC RBC AUTO-ENTMCNC: 34.6 G/DL (ref 31.5–36.5)
MCV RBC AUTO: 92 FL (ref 78–100)
MONOCYTES # BLD AUTO: 0.6 10E3/UL (ref 0–1.3)
MONOCYTES NFR BLD AUTO: 10 %
NEUTROPHILS # BLD AUTO: 1.9 10E3/UL (ref 1.6–8.3)
NEUTROPHILS NFR BLD AUTO: 34 %
NRBC # BLD AUTO: 0 10E3/UL
NRBC BLD AUTO-RTO: 0 /100
PLATELET # BLD AUTO: 158 10E3/UL (ref 150–450)
RBC # BLD AUTO: 4.63 10E6/UL (ref 4.4–5.9)
SARS-COV-2 RNA RESP QL NAA+PROBE: NEGATIVE
WBC # BLD AUTO: 5.5 10E3/UL (ref 4–11)

## 2022-08-12 PROCEDURE — 86780 TREPONEMA PALLIDUM: CPT | Performed by: PSYCHIATRY & NEUROLOGY

## 2022-08-12 PROCEDURE — 36415 COLL VENOUS BLD VENIPUNCTURE: CPT | Performed by: PSYCHIATRY & NEUROLOGY

## 2022-08-12 PROCEDURE — U0003 INFECTIOUS AGENT DETECTION BY NUCLEIC ACID (DNA OR RNA); SEVERE ACUTE RESPIRATORY SYNDROME CORONAVIRUS 2 (SARS-COV-2) (CORONAVIRUS DISEASE [COVID-19]), AMPLIFIED PROBE TECHNIQUE, MAKING USE OF HIGH THROUGHPUT TECHNOLOGIES AS DESCRIBED BY CMS-2020-01-R: HCPCS | Performed by: PSYCHIATRY & NEUROLOGY

## 2022-08-12 PROCEDURE — 250N000013 HC RX MED GY IP 250 OP 250 PS 637: Performed by: PSYCHIATRY & NEUROLOGY

## 2022-08-12 PROCEDURE — 124N000003 HC R&B MH SENIOR/ADOLESCENT

## 2022-08-12 PROCEDURE — 85025 COMPLETE CBC W/AUTO DIFF WBC: CPT | Performed by: PSYCHIATRY & NEUROLOGY

## 2022-08-12 PROCEDURE — 99231 SBSQ HOSP IP/OBS SF/LOW 25: CPT | Performed by: PSYCHIATRY & NEUROLOGY

## 2022-08-12 RX ADMIN — SALMETEROL XINAFOATE 1 PUFF: 50 POWDER, METERED ORAL; RESPIRATORY (INHALATION) at 20:17

## 2022-08-12 RX ADMIN — CLOZAPINE 250 MG: 100 TABLET ORAL at 20:16

## 2022-08-12 RX ADMIN — BUSPIRONE HYDROCHLORIDE 30 MG: 15 TABLET ORAL at 20:15

## 2022-08-12 RX ADMIN — SALMETEROL XINAFOATE 1 PUFF: 50 POWDER, METERED ORAL; RESPIRATORY (INHALATION) at 08:43

## 2022-08-12 RX ADMIN — BUSPIRONE HYDROCHLORIDE 30 MG: 15 TABLET ORAL at 08:42

## 2022-08-12 RX ADMIN — GABAPENTIN 100 MG: 100 CAPSULE ORAL at 18:44

## 2022-08-12 RX ADMIN — MIRTAZAPINE 15 MG: 15 TABLET, FILM COATED ORAL at 20:15

## 2022-08-12 RX ADMIN — LEVOTHYROXINE SODIUM 88 MCG: 0.09 TABLET ORAL at 08:42

## 2022-08-12 RX ADMIN — ASPIRIN 81 MG: 81 TABLET, COATED ORAL at 08:42

## 2022-08-12 RX ADMIN — DONEPEZIL HYDROCHLORIDE 10 MG: 10 TABLET ORAL at 20:16

## 2022-08-12 RX ADMIN — GABAPENTIN 100 MG: 100 CAPSULE ORAL at 08:42

## 2022-08-12 RX ADMIN — MEMANTINE 10 MG: 10 TABLET ORAL at 20:16

## 2022-08-12 RX ADMIN — GABAPENTIN 100 MG: 100 CAPSULE ORAL at 12:49

## 2022-08-12 RX ADMIN — BENZTROPINE MESYLATE 0.5 MG: 0.5 TABLET ORAL at 08:42

## 2022-08-12 RX ADMIN — MEGESTROL ACETATE 20 MG: 20 TABLET ORAL at 08:42

## 2022-08-12 RX ADMIN — MEMANTINE 10 MG: 10 TABLET ORAL at 08:43

## 2022-08-12 RX ADMIN — Medication 5 MG: at 20:15

## 2022-08-12 RX ADMIN — BENZTROPINE MESYLATE 0.5 MG: 0.5 TABLET ORAL at 20:15

## 2022-08-12 ASSESSMENT — ACTIVITIES OF DAILY LIVING (ADL)
ADLS_ACUITY_SCORE: 76
ADLS_ACUITY_SCORE: 76
ADLS_ACUITY_SCORE: 72
ADLS_ACUITY_SCORE: 76
ADLS_ACUITY_SCORE: 76
ADLS_ACUITY_SCORE: 72
ADLS_ACUITY_SCORE: 72
ADLS_ACUITY_SCORE: 76
ADLS_ACUITY_SCORE: 76
ADLS_ACUITY_SCORE: 72

## 2022-08-12 NOTE — PLAN OF CARE
Problem: Sleep Disturbance (Psychotic Signs/Symptoms)  Goal: Improved Sleep (Psychotic Signs/Symptoms)  Outcome: Ongoing, Progressing   Goal Outcome Evaluation:     Patient slept a total of 8.5 hours. He did not get up the whole night. No behavioral issues noted the whole shift.

## 2022-08-12 NOTE — PLAN OF CARE
Problem: Behavior Regulation Impairment (Psychotic Signs/Symptoms)  Goal: Improved Behavioral Control (Psychotic Signs/Symptoms)  Outcome: Ongoing, Not Progressing   Goal Outcome Evaluation:    Plan of Care Reviewed With: patient     Patient remains isolative and withdrawn. His mood is calm with a flat affect. He came out for dinner and went back to his room after. He ate about 75% of his dinner. Took all his bedtime medications. No SI/SIB nor hallucinations. No responding to stimuli observed. Patient was napping most of the shift. He went to the bathroom x 2 and voided freely to clear yellow urine. No BM this shift. Patient remains unkempt with greasy hair. Refused to take a shower nor bed bath.

## 2022-08-12 NOTE — PROGRESS NOTES
PSYCHIATRY  PROGRESS NOTE     DATE OF SERVICE   08/12/2022       CHIEF COMPLAINT   Patient was admitted due to inability to safely care for himself and psychosis.       SUBJECTIVE   Nursing reports:   Patient remains isolative and withdrawn. His mood is calm with a flat affect. He came out for dinner and went back to his room after. He ate about 75% of his dinner. Took all his bedtime medications. No SI/SIB nor hallucinations. No responding to stimuli observed. Patient was napping most of the shift. He went to the bathroom x 2 and voided freely to clear yellow urine. No BM this shift. Patient remains unkempt with greasy hair. Refused to take a shower nor bed bath.    Patient has been discussed in detail with the  during team meeting.  We continue with no updates about the guardianship process.     OBJECTIVE   Patient was seen and evaluated at bedside by himself, this was a face-to-face evaluation.  Patient reported that he is doing well and denied all psychiatric symptoms.  He has been observed talking to himself and when asked he said that he is talking to his sister or his mother.  Remains delusional at baseline.  Patient is preoccupied with food today and during multiple times he asked about lunch.  Understood that lunch will be available within an hour.       MEDICATIONS   Medications:  Scheduled Meds:    aspirin  81 mg Oral Daily     benztropine  0.5 mg Oral BID     busPIRone HCl  30 mg Oral BID     cloZAPine  250 mg Oral At Bedtime     donepezil  10 mg Oral At Bedtime     gabapentin  100 mg Oral TID w/meals     levothyroxine  88 mcg Oral Daily     megestrol  20 mg Oral Daily     melatonin  5 mg Oral At Bedtime     memantine  10 mg Oral BID     mirtazapine  15 mg Oral At Bedtime     salmeterol  1 puff Inhalation BID     Continuous Infusions:  PRN Meds:.albuterol, alum & mag hydroxide-simethicone, benzocaine-menthol, hydrOXYzine, nicotine, nystatin, polyethylene glycol, polyethylene glycol-propylene  "glycol PF, senna-docusate    Medication adherence issues: MS Med Adherence Y/N: Yes, Hospitalization  Medication side effects: MEDICATION SIDE EFFECTS: no side effects reported  Benefit: Yes / No: Yes       ROS   A comprehensive review of systems was negative.       MENTAL STATUS EXAM   Vitals: /79   Pulse 83   Temp 97.5  F (36.4  C) (Temporal)   Resp 16   Ht 1.778 m (5' 10\")   Wt 77.3 kg (170 lb 6.7 oz)   SpO2 96%   BMI 24.45 kg/m         Appearance:  No apparent distress  Mood: \"Feeling okay\"  Affect: Calm  Suicidal Ideation: Denies  Homicidal Ideation: Denies  Thought process: Disorganized  Thought content: Remains confused and delusional and hallucinating at times.    Fund of Knowledge: Below average  Attention/Concentration: Poor  Language ability: Significantly impaired  Memory: Global memory impairment  Insight:  Poor.  Judgement: Poor  Orientation: Only to person  Psychomotor Behavior: slowed    Muscle Strength and Tone: MuscleStrength: Normal and Atrophy  Gait and Station: Not evaluated       LABS   personally reviewed.     No results found for: PHENYTOIN, PHENOBARB, VALPROATE, CBMZ       DIAGNOSIS   Principal Problem:    Major neurocognitive disorder, due to multiple etiologies, with behavioral disturbance, severe (H)    Active Problem List:  Patient Active Problem List   Diagnosis     TBI with aggressive behavior     HTN (hypertension)     COPD (chronic obstructive pulmonary disease) (H)     Dementia with behavioral disturbance (H)     Chronic schizophrenia (H)     Smoker     Agitation     Behavior disturbance     Psychosis (H)     Major neurocognitive disorder, due to multiple etiologies, with behavioral disturbance, severe (H)     Paranoid schizophrenia, chronic condition with acute exacerbation (H)     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 Ongoing education given regarding diagnostic and treatment options with risks, benefits and alternatives and adequate verbalization of understanding.  2.  Medications       BuSpar 30 mg 2 times daily       Cogentin 0.5 mg 2 times a day       Clozaril 250 mg at bedtime       Aricept 10 mg at bedtime       Gabapentin 100 mg 3 times a day       Namenda 10 mg 2 times a day       Remeron 15 mg at bedtime       melatonin 5 mg at bedtime  3.  Medical team following the patient   4.   coordinating a safe discharge plan with the patient.  5.  Labs have been ordered, liver functions remained stable consistent with the hep C diagnosis.      Risk Assessment: [facility] RISK ASSESSMENT: Patient able to contract for safety    Coordination of Care:   Treatment Plan reviewed and physician signed, Care discussed with Care/Treatment Team Members, Chart reviewed and Patient seen      Re-Certification I certify that the inpatient psychiatric facility services furnished since the previous certification were, and continue to be, medically necessary for, either, treatment which could reasonably be expected to improve the patient s condition or diagnostic study and that the hospital records indicate that the services furnished were, either, intensive treatment services, admission and related services necessary for diagnostic study, or equivalent services.     I certify that the patient continues to need, on a daily basis, active treatment furnished directly by or requiring the supervision of inpatient psychiatric facility personnel.   I estimate 14 days of hospitalization is necessary for proper treatment of the patient. My plans for post-hospital care for this patient are  TBD     Ginger Dubon MD    -     08/12/2022  -     11:23 AM    Total time 15 minutes with > 50%spent on coordination of cares and psycho-education.    This note was created with help of Dragon dictation system.  Grammatical / typing errors are not intentional.    Ginger Dubon MD

## 2022-08-12 NOTE — PLAN OF CARE
"Goal Outcome Evaluation:    Plan of Care Reviewed With: patient               Nursing Assessment    Psychosis (H) [F29]    Admit Date: 1/26/2022    Length of Stay: 198    Patient evaluation continues. Assessed mood,anxiety,thoughts and behavior. Patient is  progressing towards goals. Patient is encouraged to participate in groups and assisted to develop healthy coping skills.  Patient admits to auditory hallucinations /79   Pulse 83   Temp 97.5  F (36.4  C) (Temporal)   Resp 16   Ht 1.778 m (5' 10\")   Wt 77.3 kg (170 lb 6.7 oz)   SpO2 96%   BMI 24.45 kg/m      Mood: ok    Patient reports depression none and reports anxiety none    Affect:flat,blunted    Sleep: good    Appetite: good    SI: denies    HI: denies    SIB: denies      Medication Compliance: yes    Group participation: no    ADL's: staff assisted with cares    Fall risk interventions: proper foot wear, orthostatic B/P    Rinku Score Interventions: none    Discharge planning in process    Refer to daily team meeting notes for individualized plan of care. Nursing will continue to assess.    *Scale is 1-10 and 10 is the worst.         "

## 2022-08-13 PROCEDURE — 250N000013 HC RX MED GY IP 250 OP 250 PS 637: Performed by: PSYCHIATRY & NEUROLOGY

## 2022-08-13 PROCEDURE — 124N000003 HC R&B MH SENIOR/ADOLESCENT

## 2022-08-13 RX ADMIN — GABAPENTIN 100 MG: 100 CAPSULE ORAL at 17:51

## 2022-08-13 RX ADMIN — BUSPIRONE HYDROCHLORIDE 30 MG: 15 TABLET ORAL at 08:09

## 2022-08-13 RX ADMIN — BENZTROPINE MESYLATE 0.5 MG: 0.5 TABLET ORAL at 08:10

## 2022-08-13 RX ADMIN — MIRTAZAPINE 15 MG: 15 TABLET, FILM COATED ORAL at 20:04

## 2022-08-13 RX ADMIN — BENZTROPINE MESYLATE 0.5 MG: 0.5 TABLET ORAL at 20:03

## 2022-08-13 RX ADMIN — MEMANTINE 10 MG: 10 TABLET ORAL at 20:04

## 2022-08-13 RX ADMIN — GABAPENTIN 100 MG: 100 CAPSULE ORAL at 12:56

## 2022-08-13 RX ADMIN — BUSPIRONE HYDROCHLORIDE 30 MG: 15 TABLET ORAL at 20:04

## 2022-08-13 RX ADMIN — Medication 5 MG: at 20:03

## 2022-08-13 RX ADMIN — DONEPEZIL HYDROCHLORIDE 10 MG: 10 TABLET ORAL at 20:03

## 2022-08-13 RX ADMIN — SALMETEROL XINAFOATE 1 PUFF: 50 POWDER, METERED ORAL; RESPIRATORY (INHALATION) at 20:04

## 2022-08-13 RX ADMIN — MEMANTINE 10 MG: 10 TABLET ORAL at 08:10

## 2022-08-13 RX ADMIN — MEGESTROL ACETATE 20 MG: 20 TABLET ORAL at 08:09

## 2022-08-13 RX ADMIN — ASPIRIN 81 MG: 81 TABLET, COATED ORAL at 08:09

## 2022-08-13 RX ADMIN — GABAPENTIN 100 MG: 100 CAPSULE ORAL at 08:10

## 2022-08-13 RX ADMIN — CLOZAPINE 250 MG: 100 TABLET ORAL at 20:03

## 2022-08-13 RX ADMIN — SALMETEROL XINAFOATE 1 PUFF: 50 POWDER, METERED ORAL; RESPIRATORY (INHALATION) at 08:10

## 2022-08-13 RX ADMIN — LEVOTHYROXINE SODIUM 88 MCG: 0.09 TABLET ORAL at 08:09

## 2022-08-13 ASSESSMENT — ACTIVITIES OF DAILY LIVING (ADL)
ADLS_ACUITY_SCORE: 72

## 2022-08-13 NOTE — PLAN OF CARE
"Shift Summary  Mental Status  Visible in milieu only for dinner. Isolative and socially withdrawn to self. Mood is calm with flat affect. Poor hygiene noted. Shower offered ,but declined and said \" maybe tomorrow.\"  Denies SI, SIB, racing thought, VH.AH,depression and anxiety. During rounds, writer noted that pt is responding to internal stimuli by talking to self. Took medication with no difficulties. No side effect reported by pt. Insight and judgment is impaired.   Prn: none  Physical Status  Moves around with no difficulties.    Appetite adequate. Ate 75% of dinner.   Pt went to bathroom x 2 during evening shift and voided x 2. Denies any problem with bowel and bladder.   Denies pain.   Today's Lab orders: Covid test done.   Prn: none  Continue to monitor pt's status Q 15 minutes and stabilize the patient's symptoms with the use of medications and therapeutic programming.                         "

## 2022-08-13 NOTE — PLAN OF CARE
Problem: Sleep Disturbance (Psychotic Signs/Symptoms)  Goal: Improved Sleep (Psychotic Signs/Symptoms)  Outcome: Ongoing, Progressing   Goal Outcome Evaluation:    Patient slept a total of 10.5 hours.  No behavioral concerns noted the whole shift.

## 2022-08-13 NOTE — PLAN OF CARE
Goal Outcome Evaluation:    Patient has been withdrawn and isolative to his room. He continues to appear flat and blunted. Minimal contact with staff and peers.     Patient came out of his room to eat breakfast and ate 100%. He was medication compliant; denied having medication side effects.    Patient was encouraged to come out for lunch, but he refused. His lunch was taken to him in his room; he ate about 25% of lunch. Adequate fluid intake.    Patient was encouraged to take a shower during this shift but he declined.    We'll continue to monitor.

## 2022-08-14 PROCEDURE — 124N000003 HC R&B MH SENIOR/ADOLESCENT

## 2022-08-14 PROCEDURE — 250N000013 HC RX MED GY IP 250 OP 250 PS 637: Performed by: PSYCHIATRY & NEUROLOGY

## 2022-08-14 RX ADMIN — SALMETEROL XINAFOATE 1 PUFF: 50 POWDER, METERED ORAL; RESPIRATORY (INHALATION) at 08:17

## 2022-08-14 RX ADMIN — ASPIRIN 81 MG: 81 TABLET, COATED ORAL at 08:15

## 2022-08-14 RX ADMIN — GABAPENTIN 100 MG: 100 CAPSULE ORAL at 12:31

## 2022-08-14 RX ADMIN — BUSPIRONE HYDROCHLORIDE 30 MG: 15 TABLET ORAL at 08:14

## 2022-08-14 RX ADMIN — GABAPENTIN 100 MG: 100 CAPSULE ORAL at 18:06

## 2022-08-14 RX ADMIN — BENZTROPINE MESYLATE 0.5 MG: 0.5 TABLET ORAL at 20:38

## 2022-08-14 RX ADMIN — CLOZAPINE 250 MG: 100 TABLET ORAL at 20:37

## 2022-08-14 RX ADMIN — DONEPEZIL HYDROCHLORIDE 10 MG: 10 TABLET ORAL at 20:38

## 2022-08-14 RX ADMIN — MEMANTINE 10 MG: 10 TABLET ORAL at 20:37

## 2022-08-14 RX ADMIN — SALMETEROL XINAFOATE 1 PUFF: 50 POWDER, METERED ORAL; RESPIRATORY (INHALATION) at 20:38

## 2022-08-14 RX ADMIN — LEVOTHYROXINE SODIUM 88 MCG: 0.09 TABLET ORAL at 08:14

## 2022-08-14 RX ADMIN — Medication 5 MG: at 20:37

## 2022-08-14 RX ADMIN — MEMANTINE 10 MG: 10 TABLET ORAL at 08:15

## 2022-08-14 RX ADMIN — BUSPIRONE HYDROCHLORIDE 30 MG: 15 TABLET ORAL at 20:37

## 2022-08-14 RX ADMIN — GABAPENTIN 100 MG: 100 CAPSULE ORAL at 08:15

## 2022-08-14 RX ADMIN — MEGESTROL ACETATE 20 MG: 20 TABLET ORAL at 08:15

## 2022-08-14 RX ADMIN — MIRTAZAPINE 15 MG: 15 TABLET, FILM COATED ORAL at 20:38

## 2022-08-14 RX ADMIN — BENZTROPINE MESYLATE 0.5 MG: 0.5 TABLET ORAL at 08:14

## 2022-08-14 ASSESSMENT — ACTIVITIES OF DAILY LIVING (ADL)
ADLS_ACUITY_SCORE: 72

## 2022-08-14 NOTE — PLAN OF CARE
Problem: Sleep Disturbance (Psychotic Signs/Symptoms)  Goal: Improved Sleep (Psychotic Signs/Symptoms)  Outcome: Ongoing, Progressing   Goal Outcome Evaluation:    Patient slept a total of 9.5 hours without any interruptions. No behavioral concerns noted the whole night.

## 2022-08-14 NOTE — PLAN OF CARE
Problem: Behavior Regulation Impairment (Psychotic Signs/Symptoms)  Goal: Improved Behavioral Control (Psychotic Signs/Symptoms)  Outcome: Ongoing, Progressing   Goal Outcome Evaluation:    Patient isolated to room and avoids social contact. He appears blunted,flat, withdrawn and disheveled. He denies all psych symptoms. He came out for lunch with encouragement, is medication complaint, denies SI/SIB.            Addendum   Patient continued to be isolative to room, came out for dinner with encouragement. He declined encouragement to shower, denies pain or discomfort and in medication complaint.

## 2022-08-14 NOTE — PLAN OF CARE
"Goal Outcome Evaluation:    Plan of Care Reviewed With: patient        Problem: Behavioral Health Plan of Care  Goal: Optimized Coping Skills in Response to Life Stressors  Outcome: Ongoing, Progressing  Intervention: Promote Effective Coping Strategies  Recent Flowsheet Documentation  Taken 8/13/2022 1700 by Dawson Easley RN  Supportive Measures:    active listening utilized    decision-making supported    goal-setting facilitated    self-care encouraged    verbalization of feelings encouraged        Patient alert and oriented x 3. Patient denied SI/SIB/HI/AH/VH. Patient denied pain. Patient noted to be withdrawn and isolated. Activity encouraged, but patient refused. Patient said his goal is to \"get out of the hospital\". Informed patient that the team is working on his discharge and to speak with his doctor further. Patient said will do. Patient denied concerns with bowel and bladder. Patient denied anxiety and depression. Will continue to monitor and update if there are changes as needed.      "

## 2022-08-15 LAB — T PALLIDUM AB SER QL AGGL: NON REACTIVE

## 2022-08-15 PROCEDURE — 250N000013 HC RX MED GY IP 250 OP 250 PS 637: Performed by: PSYCHIATRY & NEUROLOGY

## 2022-08-15 PROCEDURE — 124N000003 HC R&B MH SENIOR/ADOLESCENT

## 2022-08-15 PROCEDURE — 99231 SBSQ HOSP IP/OBS SF/LOW 25: CPT | Performed by: PSYCHIATRY & NEUROLOGY

## 2022-08-15 RX ADMIN — Medication 5 MG: at 20:19

## 2022-08-15 RX ADMIN — ASPIRIN 81 MG: 81 TABLET, COATED ORAL at 09:02

## 2022-08-15 RX ADMIN — MIRTAZAPINE 15 MG: 15 TABLET, FILM COATED ORAL at 20:19

## 2022-08-15 RX ADMIN — SALMETEROL XINAFOATE 1 PUFF: 50 POWDER, METERED ORAL; RESPIRATORY (INHALATION) at 09:12

## 2022-08-15 RX ADMIN — GABAPENTIN 100 MG: 100 CAPSULE ORAL at 17:40

## 2022-08-15 RX ADMIN — BUSPIRONE HYDROCHLORIDE 30 MG: 15 TABLET ORAL at 09:03

## 2022-08-15 RX ADMIN — MEGESTROL ACETATE 20 MG: 20 TABLET ORAL at 09:02

## 2022-08-15 RX ADMIN — MEMANTINE 10 MG: 10 TABLET ORAL at 20:20

## 2022-08-15 RX ADMIN — BENZTROPINE MESYLATE 0.5 MG: 0.5 TABLET ORAL at 20:19

## 2022-08-15 RX ADMIN — GABAPENTIN 100 MG: 100 CAPSULE ORAL at 13:13

## 2022-08-15 RX ADMIN — CLOZAPINE 250 MG: 100 TABLET ORAL at 20:19

## 2022-08-15 RX ADMIN — GABAPENTIN 100 MG: 100 CAPSULE ORAL at 09:02

## 2022-08-15 RX ADMIN — SALMETEROL XINAFOATE 1 PUFF: 50 POWDER, METERED ORAL; RESPIRATORY (INHALATION) at 20:21

## 2022-08-15 RX ADMIN — LEVOTHYROXINE SODIUM 88 MCG: 0.09 TABLET ORAL at 09:02

## 2022-08-15 RX ADMIN — BUSPIRONE HYDROCHLORIDE 30 MG: 15 TABLET ORAL at 20:19

## 2022-08-15 RX ADMIN — MEMANTINE 10 MG: 10 TABLET ORAL at 09:02

## 2022-08-15 RX ADMIN — DONEPEZIL HYDROCHLORIDE 10 MG: 10 TABLET ORAL at 20:19

## 2022-08-15 ASSESSMENT — ACTIVITIES OF DAILY LIVING (ADL)
ADLS_ACUITY_SCORE: 72

## 2022-08-15 NOTE — PLAN OF CARE
Problem: Behavioral Health Plan of Care  Goal: Plan of Care Review  Outcome: Ongoing, Progressing  Flowsheets (Taken 8/15/2022 1030)  Plan of Care Reviewed With: patient  Patient Agreement with Plan of Care: agrees     Problem: Depression  Goal: Improved Mood  Outcome: Ongoing, Progressing  Intervention: Monitor and Manage Depressive Symptoms  Recent Flowsheet Documentation  Taken 8/15/2022 1030 by Lizzeth Burroughs RN  Supportive Measures:    active listening utilized    relaxation techniques promoted    verbalization of feelings encouraged  Family/Support System Care: self-care encouraged     Problem: Suicidal Behavior  Goal: Suicidal Behavior is Absent or Managed  Outcome: Ongoing, Progressing  Intervention: Provide Immediate and Ongoing Protective Physical Environment  Recent Flowsheet Documentation  Taken 8/15/2022 1030 by Lizzeth Burroughs RN  Safe Transition Promotion: personal safety plan developed   Goal Outcome Evaluation:    Plan of Care Reviewed With: patient      Pt presented with blunted and flat affect. Isolated to his room the entire shift despite encouragements to come out in the milieu, pt declined. Ate breakfast and lunch in his room. Ate about 75% for breakfast and 50% lunch. Appeared to be drinking ok. Pt was observed walking to the bathroom x 2 without assist. Denies pain. Endorsed depression at 8, and anxiety 9. Denies HI/SI, hallucinations and other mental health symptoms. Contract for safety. Cogentin held this morning due to BP 96/66. Pt hygiene is disheveld. Encouraged pt to help with shower but pt declined.

## 2022-08-15 NOTE — PROGRESS NOTES
PSYCHIATRY  PROGRESS NOTE     DATE OF SERVICE   08/15/2022       CHIEF COMPLAINT   Patient was admitted due to inability to safely care for himself and psychosis.       SUBJECTIVE   Nursing reports:   Pt presented with blunted and flat affect. Isolated to his room the entire shift despite encouragements to come out in the milieu, pt declined. Ate breakfast and lunch in his room. Ate about 75% for breakfast and 50% lunch. Appeared to be drinking ok. Pt was observed walking to the bathroom x 2 without assist. Denies pain. Endorsed depression at 8, and anxiety 9. Denies HI/SI, hallucinations and other mental health symptoms. Contract for safety. Cogentin held this morning due to BP 96/66. Pt hygiene is disheveld. Encouraged pt to help with shower but pt declined.     Patient has been discussed in detail with the  during team meeting.  We continue with no updates about the guardianship process.     OBJECTIVE   Patient was seen and evaluated at bedside by himself, this was a face-to-face evaluation.  Patient reported that he is doing okay and at this time he is denying all psychiatric symptoms.  I have observed that the patient talking to himself and actively hallucinating.  During my assessment the patient was calm, pleasant and cooperative.  He appropriately asked to have more juice and stated that he is not interested in coming out of his room.  Other than this he denies any other concerns.       MEDICATIONS   Medications:  Scheduled Meds:    aspirin  81 mg Oral Daily     benztropine  0.5 mg Oral BID     busPIRone HCl  30 mg Oral BID     cloZAPine  250 mg Oral At Bedtime     donepezil  10 mg Oral At Bedtime     gabapentin  100 mg Oral TID w/meals     levothyroxine  88 mcg Oral Daily     megestrol  20 mg Oral Daily     melatonin  5 mg Oral At Bedtime     memantine  10 mg Oral BID     mirtazapine  15 mg Oral At Bedtime     salmeterol  1 puff Inhalation BID     Continuous Infusions:  PRN Meds:.albuterol,  "alum & mag hydroxide-simethicone, benzocaine-menthol, hydrOXYzine, nicotine, nystatin, polyethylene glycol, polyethylene glycol-propylene glycol PF, senna-docusate    Medication adherence issues: MS Med Adherence Y/N: Yes, Hospitalization  Medication side effects: MEDICATION SIDE EFFECTS: no side effects reported  Benefit: Yes / No: Yes       ROS   A comprehensive review of systems was negative.       MENTAL STATUS EXAM   Vitals: BP 96/66   Pulse 105   Temp 97.1  F (36.2  C)   Resp 16   Ht 1.778 m (5' 10\")   Wt 77.3 kg (170 lb 6.7 oz)   SpO2 97%   BMI 24.45 kg/m         Appearance:  No apparent distress  Mood: \"Feeling all right\"  Affect: Calm  Suicidal Ideation: Denies  Homicidal Ideation: Denies  Thought process: Disorganized and concrete  Thought content: Remains confused and delusional and hallucinating at times.    Fund of Knowledge: Below average  Attention/Concentration: Poor  Language ability: Significantly impaired  Memory: Global memory impairment  Insight:  Poor.  Judgement: Poor  Orientation: Only to person  Psychomotor Behavior: slowed    Muscle Strength and Tone: MuscleStrength: Normal and Atrophy  Gait and Station: Not evaluated       LABS   personally reviewed.     No results found for: PHENYTOIN, PHENOBARB, VALPROATE, CBMZ       DIAGNOSIS   Principal Problem:    Major neurocognitive disorder, due to multiple etiologies, with behavioral disturbance, severe (H)    Active Problem List:  Patient Active Problem List   Diagnosis     TBI with aggressive behavior     HTN (hypertension)     COPD (chronic obstructive pulmonary disease) (H)     Dementia with behavioral disturbance (H)     Chronic schizophrenia (H)     Smoker     Agitation     Behavior disturbance     Psychosis (H)     Major neurocognitive disorder, due to multiple etiologies, with behavioral disturbance, severe (H)     Paranoid schizophrenia, chronic condition with acute exacerbation (H)     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 Ongoing education given regarding diagnostic and treatment options with risks, benefits and alternatives and adequate verbalization of understanding.  2.  Medications       BuSpar 30 mg 2 times daily       Cogentin 0.5 mg 2 times a day       Clozaril 250 mg at bedtime       Aricept 10 mg at bedtime       Gabapentin 100 mg 3 times a day       Namenda 10 mg 2 times a day       Remeron 15 mg at bedtime       melatonin 5 mg at bedtime  3.  Medical team following the patient   4.   coordinating a safe discharge plan with the patient.  5.  Labs have been ordered, liver functions remained stable consistent with the hep C diagnosis.      Risk Assessment: [facility] RISK ASSESSMENT: Patient able to contract for safety    Coordination of Care:   Treatment Plan reviewed and physician signed, Care discussed with Care/Treatment Team Members, Chart reviewed and Patient seen      Re-Certification I certify that the inpatient psychiatric facility services furnished since the previous certification were, and continue to be, medically necessary for, either, treatment which could reasonably be expected to improve the patient s condition or diagnostic study and that the hospital records indicate that the services furnished were, either, intensive treatment services, admission and related services necessary for diagnostic study, or equivalent services.     I certify that the patient continues to need, on a daily basis, active treatment furnished directly by or requiring the supervision of inpatient psychiatric facility personnel.   I estimate 14 days of hospitalization is necessary for proper treatment of the patient. My plans for post-hospital care for this patient are  TBD     Ginger Dubon MD    -     08/15/2022  -     3:01 PM    Total time  15 minutes with > 50%spent on coordination of cares and psycho-education.    This note was created with help of Dragon dictation system. Grammatical / typing errors are not intentional.    Ginger Dubon MD

## 2022-08-15 NOTE — PLAN OF CARE
Problem: Sleep Disturbance (Psychotic Signs/Symptoms)  Goal: Improved Sleep (Psychotic Signs/Symptoms)  Outcome: Ongoing, Progressing   Goal Outcome Evaluation:    Patient slept a total of 10.5 hours without any interruptions. No behavioral concerns noted the whole night.

## 2022-08-15 NOTE — PLAN OF CARE
08/15/22 1413   Individualization/Patient Specific Goals   Patient Personal Strengths resilient   Patient Vulnerabilities lacks insight into illness   Anxieties, Fears or Concerns Patient lacks insight into his illness. He is at baseline and has delusions and hallucinations.   Individualized Care Needs Patient is on a commitment/pittman and guardianship case is pending.   Patient-Specific Goals (Include Timeframe) Continue with plan for stabilization 10-15 days   Interprofessional Rounds   Summary Patient continues to be stable at baseline. Patient has delusions and hallucinations. Patient is waiting for guardianship case to be finalized.   Participants nursing;psychiatrist;CTC;OT   Team Discussion   Participants Dr. Root, OT, CTC, RN   Progress Making progress, patient is at baseline   Anticipated length of stay 10-14 days   Continued Stay Criteria/Rationale Patient can not discharge safely. He needs assistance with all ADLs. Team is waiting on final guardianship ruling. Patient will discharge to a group home/assisted living.   Medical/Physical Please see H and P.   Plan Continue with plan: medication management, psychiatric evaluations, medical evaluations as needed, nursing assessments, milieu therapy, and CTC case management.   Anticipated Discharge Disposition assisted living     PRECAUTIONS AND SAFETY    Behavioral Orders   Procedures    Code 1 - Restrict to Unit    Code 2     CT scan only    Code 3     Patient can go out of the unit with staff supervision. He needs to be taken out by him self with 2 staff and security present.    Fall precautions    Routine Programming     As clinically indicated    Status 15     Every 15 minutes.    Status Individual Observation     Patient SIO status reviewed with team/RN.  Please also refer to RN/team documentation for add'l detail.    -SIO staff to monitor following which have contributed to patient being on SIO:  Unresponsive episode with increased  confusion  -Possible interventions SIO staff could use to support patient's treatment progress:  Monitor patient for further unresponsive episodes, SBA with cares and ambulation  -When following observed, team will review discontinuation of SIO:  Improved medical condition     Order Specific Question:   CONTINUOUS 24 hours / day     Answer:   Other     Order Specific Question:   Specify distance     Answer:   10ft     Order Specific Question:   Indications for SIO     Answer:   Self-injury risk    Status Individual Observation     Patient SIO status reviewed with team/RN.  Please also refer to RN/team documentation for add'l detail.    -SIO staff to monitor following which have contributed to patient being on SIO:  Unresponsive episode with increased confusion  -Possible interventions SIO staff could use to support patient's treatment progress:  Monitor patient for further unresponsive episodes, SBA with cares and ambulation  -When following observed, team will review discontinuation of SIO:  Improved medical condition     Order Specific Question:   CONTINUOUS 24 hours / day     Answer:   Other     Order Specific Question:   Specify distance     Answer:   10ft     Order Specific Question:   Indications for SIO     Answer:   Self-injury risk       Safety  Safety WDL: WDL  Patient Location: patient room, own  Observed Behavior: calm  Observed Behavior (Comment): laying in bed  Safety Measures: safety plan reviewed  Diversional Activity: other (see comments) (pt declined to come out of the roonm)  Suicidality: Status 15  Seizure precautions: clutter free environment  Assault: status 15  Elopement Assessment: Distress about legal situation (holds for mental health or criminal)  Elopement Interventions: status 15  Sexual: status 15, private room

## 2022-08-16 PROCEDURE — 99231 SBSQ HOSP IP/OBS SF/LOW 25: CPT | Performed by: PSYCHIATRY & NEUROLOGY

## 2022-08-16 PROCEDURE — 250N000013 HC RX MED GY IP 250 OP 250 PS 637: Performed by: PSYCHIATRY & NEUROLOGY

## 2022-08-16 PROCEDURE — 124N000003 HC R&B MH SENIOR/ADOLESCENT

## 2022-08-16 RX ADMIN — MIRTAZAPINE 15 MG: 15 TABLET, FILM COATED ORAL at 20:35

## 2022-08-16 RX ADMIN — Medication 5 MG: at 20:35

## 2022-08-16 RX ADMIN — GABAPENTIN 100 MG: 100 CAPSULE ORAL at 17:13

## 2022-08-16 RX ADMIN — GABAPENTIN 100 MG: 100 CAPSULE ORAL at 12:25

## 2022-08-16 RX ADMIN — BENZTROPINE MESYLATE 0.5 MG: 0.5 TABLET ORAL at 09:01

## 2022-08-16 RX ADMIN — GABAPENTIN 100 MG: 100 CAPSULE ORAL at 09:01

## 2022-08-16 RX ADMIN — SALMETEROL XINAFOATE 1 PUFF: 50 POWDER, METERED ORAL; RESPIRATORY (INHALATION) at 20:34

## 2022-08-16 RX ADMIN — MEMANTINE 10 MG: 10 TABLET ORAL at 09:01

## 2022-08-16 RX ADMIN — LEVOTHYROXINE SODIUM 88 MCG: 0.09 TABLET ORAL at 09:01

## 2022-08-16 RX ADMIN — BENZTROPINE MESYLATE 0.5 MG: 0.5 TABLET ORAL at 20:35

## 2022-08-16 RX ADMIN — BUSPIRONE HYDROCHLORIDE 30 MG: 15 TABLET ORAL at 09:01

## 2022-08-16 RX ADMIN — DONEPEZIL HYDROCHLORIDE 10 MG: 10 TABLET ORAL at 20:35

## 2022-08-16 RX ADMIN — ASPIRIN 81 MG: 81 TABLET, COATED ORAL at 09:01

## 2022-08-16 RX ADMIN — BUSPIRONE HYDROCHLORIDE 30 MG: 15 TABLET ORAL at 20:35

## 2022-08-16 RX ADMIN — MEGESTROL ACETATE 20 MG: 20 TABLET ORAL at 09:01

## 2022-08-16 RX ADMIN — SALMETEROL XINAFOATE 1 PUFF: 50 POWDER, METERED ORAL; RESPIRATORY (INHALATION) at 09:03

## 2022-08-16 RX ADMIN — MEMANTINE 10 MG: 10 TABLET ORAL at 20:35

## 2022-08-16 RX ADMIN — CLOZAPINE 250 MG: 100 TABLET ORAL at 20:34

## 2022-08-16 ASSESSMENT — ACTIVITIES OF DAILY LIVING (ADL)
ADLS_ACUITY_SCORE: 72

## 2022-08-16 NOTE — PLAN OF CARE
Problem: Cognitive Impairment (Psychotic Signs/Symptoms)  Goal: Optimal Cognitive Function (Psychotic Signs/Symptoms)  Intervention: Support and Promote Cognitive Ability  Recent Flowsheet Documentation  Taken 8/15/2022 2200 by Ember Guerrero RN  Trust Relationship/Rapport:   care explained   choices provided   emotional support provided   empathic listening provided   questions answered   questions encouraged   reassurance provided   thoughts/feelings acknowledged   Goal Outcome Evaluation:    Plan of Care Reviewed With: patient    Pt denies all mental health issues . Pt stayed in his room all shift ate 50% of his dinner and  100% of his snack . Pt refused to come of his room when asked why he wouldn't come out he responded am tired . Pt was encourage to changed his clothes but he refused still pamela he is tired .

## 2022-08-16 NOTE — PLAN OF CARE
Problem: Behavior Regulation Impairment (Psychotic Signs/Symptoms)  Goal: Improved Behavioral Control (Psychotic Signs/Symptoms)  Outcome: Ongoing, Not Progressing   Goal Outcome Evaluation:    Plan of Care Reviewed With: patient     Patient remains isolative and withdrawn. He spent his time in his room throughout this shift. He only ate about 50% of his breakfast. He refused to attend groups and declined to have a shower. He is unkempt. He is med compliant. He denied SI/SIB nor hallucinations. Denied wishing himself to be dead.                  no

## 2022-08-16 NOTE — PROGRESS NOTES
Pt appeared to be sleeping comfortably all night. Total sleep hours 9.25. No concerns reported or noted during this shift.

## 2022-08-17 PROCEDURE — 124N000003 HC R&B MH SENIOR/ADOLESCENT

## 2022-08-17 PROCEDURE — 250N000013 HC RX MED GY IP 250 OP 250 PS 637: Performed by: PSYCHIATRY & NEUROLOGY

## 2022-08-17 PROCEDURE — 99231 SBSQ HOSP IP/OBS SF/LOW 25: CPT | Performed by: PSYCHIATRY & NEUROLOGY

## 2022-08-17 RX ADMIN — Medication 5 MG: at 20:37

## 2022-08-17 RX ADMIN — BUSPIRONE HYDROCHLORIDE 30 MG: 15 TABLET ORAL at 08:08

## 2022-08-17 RX ADMIN — BENZTROPINE MESYLATE 0.5 MG: 0.5 TABLET ORAL at 20:37

## 2022-08-17 RX ADMIN — MEMANTINE 10 MG: 10 TABLET ORAL at 20:37

## 2022-08-17 RX ADMIN — LEVOTHYROXINE SODIUM 88 MCG: 0.09 TABLET ORAL at 08:08

## 2022-08-17 RX ADMIN — BENZTROPINE MESYLATE 0.5 MG: 0.5 TABLET ORAL at 08:08

## 2022-08-17 RX ADMIN — GABAPENTIN 100 MG: 100 CAPSULE ORAL at 08:08

## 2022-08-17 RX ADMIN — MIRTAZAPINE 15 MG: 15 TABLET, FILM COATED ORAL at 20:38

## 2022-08-17 RX ADMIN — ASPIRIN 81 MG: 81 TABLET, COATED ORAL at 08:08

## 2022-08-17 RX ADMIN — MEMANTINE 10 MG: 10 TABLET ORAL at 08:08

## 2022-08-17 RX ADMIN — DONEPEZIL HYDROCHLORIDE 10 MG: 10 TABLET ORAL at 20:37

## 2022-08-17 RX ADMIN — SALMETEROL XINAFOATE 1 PUFF: 50 POWDER, METERED ORAL; RESPIRATORY (INHALATION) at 20:37

## 2022-08-17 RX ADMIN — MEGESTROL ACETATE 20 MG: 20 TABLET ORAL at 08:08

## 2022-08-17 RX ADMIN — GABAPENTIN 100 MG: 100 CAPSULE ORAL at 17:29

## 2022-08-17 RX ADMIN — CLOZAPINE 250 MG: 100 TABLET ORAL at 20:37

## 2022-08-17 RX ADMIN — GABAPENTIN 100 MG: 100 CAPSULE ORAL at 12:16

## 2022-08-17 RX ADMIN — BUSPIRONE HYDROCHLORIDE 30 MG: 15 TABLET ORAL at 20:37

## 2022-08-17 ASSESSMENT — ACTIVITIES OF DAILY LIVING (ADL)
ADLS_ACUITY_SCORE: 75
ADLS_ACUITY_SCORE: 72
ADLS_ACUITY_SCORE: 75
ADLS_ACUITY_SCORE: 72

## 2022-08-17 NOTE — PLAN OF CARE
Problem: Sleep Disturbance (Psychotic Signs/Symptoms)  Goal: Improved Sleep (Psychotic Signs/Symptoms)  Outcome: Ongoing, Progressing   Goal Outcome Evaluation:    Patient slept a total of 10 hours without any interruptions. No behavioral concerns noted the whole night.

## 2022-08-17 NOTE — PROGRESS NOTES
"PSYCHIATRY  PROGRESS NOTE     DATE OF SERVICE   08/17/2022       CHIEF COMPLAINT   Patient was admitted due to inability to safely care for himself and psychosis.       SUBJECTIVE   Nursing reports:   Patient's mood is calm with a flat affect. He did not come out for meals today because of \"sore feet\". He only ate 50% of his dinner. He declined to attend groups both before and after dinner. He is guarded. He only provided limited answers to questions asked. He denied wishing himself to be dead nor having SI/SIB/hallucinations. He still has poor insights as why he is here in the hospital. He is isolative and withdrawn. No side effects of meds observed. His speech is soft/whispers. No behavioral issues observed.     Patient has been discussed in detail with the  during team meeting.  We continue with no updates about the guardianship process.     OBJECTIVE   Patient was seen and evaluated at bedside by himself, this was a face-to-face evaluation.  Patient has been brighter, calm and cooperative.  At times he has been responding to humor and he seemed to be more relaxed today.  Continues to denied all psychiatric symptoms.  He remains grandiose and at times hallucinating.       MEDICATIONS   Medications:  Scheduled Meds:    aspirin  81 mg Oral Daily     benztropine  0.5 mg Oral BID     busPIRone HCl  30 mg Oral BID     cloZAPine  250 mg Oral At Bedtime     donepezil  10 mg Oral At Bedtime     gabapentin  100 mg Oral TID w/meals     levothyroxine  88 mcg Oral Daily     megestrol  20 mg Oral Daily     melatonin  5 mg Oral At Bedtime     memantine  10 mg Oral BID     mirtazapine  15 mg Oral At Bedtime     salmeterol  1 puff Inhalation BID     Continuous Infusions:  PRN Meds:.albuterol, alum & mag hydroxide-simethicone, benzocaine-menthol, hydrOXYzine, nicotine, nystatin, polyethylene glycol, polyethylene glycol-propylene glycol PF, senna-docusate    Medication adherence issues: MS Med Adherence Y/N: Yes, " "Hospitalization  Medication side effects: MEDICATION SIDE EFFECTS: no side effects reported  Benefit: Yes / No: Yes       ROS   A comprehensive review of systems was negative.       MENTAL STATUS EXAM   Vitals: /82   Pulse 84   Temp 98.3  F (36.8  C) (Temporal)   Resp 16   Ht 1.778 m (5' 10\")   Wt 77.3 kg (170 lb 6.7 oz)   SpO2 96%   BMI 24.45 kg/m         Appearance:  No apparent distress  Mood: \"Feeling all right\"  Affect: Calm  Suicidal Ideation: Denies  Homicidal Ideation: Denies  Thought process: Disorganized and concrete  Thought content: Remains confused and delusional and hallucinating at times.    Fund of Knowledge: Below average  Attention/Concentration: Poor  Language ability: Significantly impaired  Memory: Global memory impairment  Insight:  Poor.  Judgement: Poor  Orientation: Only to person  Psychomotor Behavior: slowed    Muscle Strength and Tone: MuscleStrength: Normal and Atrophy  Gait and Station: Not evaluated       LABS   personally reviewed.     No results found for: PHENYTOIN, PHENOBARB, VALPROATE, CBMZ       DIAGNOSIS   Principal Problem:    Major neurocognitive disorder, due to multiple etiologies, with behavioral disturbance, severe (H)    Active Problem List:  Patient Active Problem List   Diagnosis     TBI with aggressive behavior     HTN (hypertension)     COPD (chronic obstructive pulmonary disease) (H)     Dementia with behavioral disturbance (H)     Chronic schizophrenia (H)     Smoker     Agitation     Behavior disturbance     Psychosis (H)     Major neurocognitive disorder, due to multiple etiologies, with behavioral disturbance, severe (H)     Paranoid schizophrenia, chronic condition with acute exacerbation (H)     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 Ongoing education given regarding " diagnostic and treatment options with risks, benefits and alternatives and adequate verbalization of understanding.  2.  Medications       BuSpar 30 mg 2 times daily       Cogentin 0.5 mg 2 times a day       Clozaril 250 mg at bedtime       Aricept 10 mg at bedtime       Gabapentin 100 mg 3 times a day       Namenda 10 mg 2 times a day       Remeron 15 mg at bedtime       melatonin 5 mg at bedtime  3.  Medical team following the patient   4.   coordinating a safe discharge plan with the patient.  5.  Labs have been ordered, liver functions remained stable consistent with the hep C diagnosis.      Risk Assessment: [facility] RISK ASSESSMENT: Patient able to contract for safety    Coordination of Care:   Treatment Plan reviewed and physician signed, Care discussed with Care/Treatment Team Members, Chart reviewed and Patient seen      Re-Certification I certify that the inpatient psychiatric facility services furnished since the previous certification were, and continue to be, medically necessary for, either, treatment which could reasonably be expected to improve the patient s condition or diagnostic study and that the hospital records indicate that the services furnished were, either, intensive treatment services, admission and related services necessary for diagnostic study, or equivalent services.     I certify that the patient continues to need, on a daily basis, active treatment furnished directly by or requiring the supervision of inpatient psychiatric facility personnel.   I estimate 14 days of hospitalization is necessary for proper treatment of the patient. My plans for post-hospital care for this patient are  TBD     Ginger Dubon MD    -     08/17/2022  -     4:01 PM    Total time 15 minutes with > 50%spent on coordination of cares and psycho-education.    This note was created with help of Dragon dictation system. Grammatical / typing errors are not intentional.    Ginger  MD Ling

## 2022-08-17 NOTE — PLAN OF CARE
"Goal Outcome Evaluation:    Plan of Care Reviewed With: patient      Patient's mood is calm with a flat affect. He did not come out for meals today because of \"sore feet\". He only ate 50% of his dinner. He declined to attend groups both before and after dinner. He is guarded. He only provided limited answers to questions asked. He denied wishing himself to be dead nor having SI/SIB/hallucinations. He still has poor insights as why he is here in the hospital. He is isolative and withdrawn. No side effects of meds observed. His speech is soft/whispers. No behavioral issues observed.                 "

## 2022-08-17 NOTE — PLAN OF CARE
LOS: 7 days   Patient Care Team:  Zechariah Fatima MD as PCP - General  Richard Heller MD as PCP - Family Medicine (Pulmonary Disease)  John Bowles MD as Consulting Physician (Gastroenterology)  Hermes Shabazz MD as Consulting Physician (Urology)  Osmar Carranza MD as Consulting Physician (Cardiology)      LEDA JAZMÍN JOHNNY  1961    Chief Complaint: Immobility syndrome  Impaired mobility/impaired self-care/impaired endurance  Ileostomy takedown , cholecystectomy, incisional hernia repair December 7, 2021.  Exploratory laparotomy with end colostomy partial colon resection December 29, 2021  Ostomy-on Imodium/glycopyrrolate  Anxiety  Anemia  Hyponatremia        Subjective     He complains of feeling short of air at night.  Previously reviewed with patient ordering outpatient sleep study.  Wears O2 2 L at night, sats 95 to 100%.  Tolerating lower dose of Coreg 6.25 mg with pulse ranging 100-106 and blood pressure systolic .  Anxious during PT session yesterday.  Transfers standby assist. Gait 160 feet RW, then 80 feet, then 50 feet.   Reviewed adding Remeron to help with sleep, appetite, anxiety and he is in agreement.       Objective     Vitals:    02/10/22 0514   BP: 101/74   Pulse: 100   Resp: 18   Temp: 97.6 °F (36.4 °C)   SpO2: 97%       PHYSICAL EXAM:   MENTAL STATUS -  AWAKE / ALERT  HEENT-    LUNGS - CTA, NO WHEEZES, RALES OR RHONCHI  HEART-regular rate and rhythm  ABD - NORMOACTIVE BOWEL SOUNDS, SOFT, NT.  Ostomy with liquid brown output.  Dressings in place at right and left abdomen  EXT - NO EDEMA OR CYANOSIS  NEURO -oriented x4.  MOTOR EXAM - RUE/RLE 5/5. LUE/LLE 5/5.           MEDICATIONS  Scheduled Meds:carvedilol, 6.25 mg, Oral, Daily Before Supper  cefdinir, 300 mg, Oral, Q12H  enoxaparin, 40 mg, Subcutaneous, Q24H  glycopyrrolate, 1 mg, Oral, BID  loperamide, 2 mg, Oral, 4x Daily AC & at Bedtime  melatonin, 3 mg, Oral, Nightly  sodium chloride, 1 g, Oral, BID With Meals  sodium  Goal Outcome Evaluation:    Flat affect, pt gave minimal response to questions asked. Pt declined to come out his room, ate meals in his room. Pt took scheduled medications except inhaler. Pt declines to shower.        zirconium cyclosilicate, 10 g, Oral, TID  Urea, 15 g, Oral, Daily      Continuous Infusions:   PRN Meds:.  acetaminophen    Glycerin-Hypromellose-    HYDROcodone-acetaminophen    ondansetron ODT    simethicone      RESULTS  No results found for: POCGLU  Results from last 7 days   Lab Units 02/07/22  0656 02/04/22  0811   WBC 10*3/mm3 7.06 9.87   HEMOGLOBIN g/dL 10.3* 10.9*   HEMATOCRIT % 33.1* 34.3*   PLATELETS 10*3/mm3 163 167     Results from last 7 days   Lab Units 02/10/22  0651 02/09/22  0815 02/08/22  0506   SODIUM mmol/L 127* 128* 128*   POTASSIUM mmol/L 5.9* 5.1 4.9   CHLORIDE mmol/L 92* 93* 90*   CO2 mmol/L 28.1 17.1* 26.2   BUN mg/dL 16 14 15   CREATININE mg/dL 1.19 0.87 0.85   CALCIUM mg/dL 9.3 9.1 9.4   GLUCOSE mg/dL 100* 113* 107*     Results from last 7 days   Lab Units 02/07/22  0656 02/04/22  0811   WBC 10*3/mm3 7.06 9.87   HEMOGLOBIN g/dL 10.3* 10.9*   HEMATOCRIT % 33.1* 34.3*   PLATELETS 10*3/mm3 163 167           Ref. Range 2/2/2022 11:41   Magnesium Latest Ref Range: 1.6 - 2.4 mg/dL 1.8   Prealbumin Latest Ref Range: 20.0 - 40.0 mg/dL 10.2 (L)      Chest x-ray February 6  XR CHEST 1 VW-  02/06/2022     HISTORY: Shortness of breath.     Heart size is mildly enlarged. The left hemidiaphragm is partially  obscured likely from small pleural effusion with some mild atelectasis  and/or pneumonia. Left upper lung and right lung appear clear.     Cardiac pacemaker seen in good position.     IMPRESSION:  1. Mild cardiomegaly.  2. Increased density in the left lung base may represent combination of  small amount of left pleural effusion with some mild atelectasis and/or  pneumonia. The left base may have cleared slightly since the 01/23/2022  study.         Assessment/Plan     Insomnia due to medical condition    Debility      Chief Complaint: Immobility syndrome  Impaired mobility/impaired self-care/impaired endurance     Ileostomy takedown , cholecystectomy, incisional hernia repair December 7,  2021.  Exploratory laparotomy with end colostomy partial colon resection December 29, 2021     Ostomy-on Imodium/glycopyrrolate  February 4-continue Robinul twice daily for now, but decreased Imodium from 2 tablets to 1 tablet p.o. before every meal and at bedtime.     Abdominal wound care  February 4-the ostomy involves the midline wound.  His wound is overall getting smaller per the ostomy wound care team.  On Monday ostomy wound care team will try to get into smaller ostomy pouch to isolate the ostomy from the wound.     Anxiety-would presently hold on adding any benzodiazepine given his multiple medical issues.    Depression-February 9-given his hyponatremia, SSRI would not be the best option at this point.  Remeron may be a better option and will review with nephrology and with the patient.  February 10-reviewed with nephrology.  Discussed with patient.  Look at starting Remeron 7.5 mg nightly for 1 week then increase to 15 mg nightly     Anemia     Electrolytes-hyponatremia  February 4-sodium was 127 on February 2, 130 on February 3, decreased 124 on February 4.  Will consult nephrology for evaluation  Feb 7 -  per Nephrology -[Continue UREA 15 gr daily and placed on fluid restriction 1.5 liters . TRIAL amiloride 10  mg daily ]   February 9-Per nephrology-Borderline hyperkalemia and Nephrology started Lokelma 10 g daily.  Continue UREA 15 gr daily and Fluid restriction 1.5 liters, Hold amiloride due to borderline hyperkalemia for 24 hours and then resume daily ,  Continue sodium chloride 1 gr BID     Congestive heart failure/tachycardia-on Coreg 12.5 mg for supper.  Blood pressure low at times.  Parameters added evening Feb 3 - Hold if systolic is <110 and diastolic <70  .  Feb 8 - has not received Coreg 12.5 q supper since parameters added on Feb 3 after BP dropped to 88/66. He received every evening on acute care stay .  Will adjust parameter to hold if SBP < 100 and decrease dose to 6.25 mg q supper pending  updated input from Cardiology to re-assess for parameters  February 9-tolerated lower dose of Coreg  February 10-Tolerating lower dose of Coreg 6.25 mg with pulse ranging 100-106 and blood pressure systolic .       Nonischemic cardiomyopathy ejection fraction 20%     DVT prophylaxis-Lovenox/SCDs     Impaired nutritional status-supplements per dietitian to follow.  Recheck prealbumin around February 16     Pulmonary- using  O2 2 L nasal cannula at night  Feb 7 - Started on cefdinir and given  breathing treatment yesterday.  He feels breathing treatment help bring up thick secretions.  He is on glycopyrrolate which probably thickens at secretions.  Tachycardia yesterday did not appear to correlate with the time of the breathing treatment as his pulse actually went down after the breathing treatment.  Will give another breathing treatment today  February 9-reviewed with patient getting outpatient sleep study  February 10-He complains of feeling short of air at night.  Previously reviewed with patient ordering outpatient sleep study.  Wears O2 2 L at night, sats 95 to 100%.  Will check overnight pulse oximetry study on 2 L nasal cannula      Insomnia-melatonin added  February 10-added Remeron  Plan for outpatient sleep study     TEAM CONF - FEB 8 - WEAK, POOR ENDURANCE. ANXIETY.SATS 96% ON ROOM AIR DURING THE DAY.  BED SBA. TRANSFERS CTG RW. GAIT 8-=160 FEET CTG RW. TOILET TRANSFERS CTG. BATH MIN. LBD DEP FOR SOCKS. UBD SBA GOWN. GROOMING SBA.  SWALLOW - ESOPHAGEAL REFLUX. COGNITION - MILD DEFICITS, IMPAIRED ATTENTION DUE TO DISCOMFORT. WOUND CARE, OSTOMY CARE. HYPONATREMIA. FLUID RESTRICTIONS. REC THERAPY LOOKING AT ACTIVITIES DIRECTED TOWARD ANXIETY. ASK PSYCHOLOGY TO SEE.   ELOS - ONE WEEK.      REHAB - admit for comprehensive acute inpatient rehabilitation .  This would be an interdisciplinary program with physical therapy 1.5 hour,  occupational therapy 1.5 hour,  5 days a week.  Rehabilitation nursing for  carryover, monitoring of cardiac and intestinal   status, bowel and bladder, and skin  Ongoing physician follow-up.  Weekly team conferences.   The patient's functional status and clinical status is unchanged from preadmission assessment and the patient continues appropriate for acute inpatient rehabilitation.  Goal is for home with outpatient   therapies.  Barrier to discharge:.  Mobility and self-care endurance- worked on condition, transfers, progressive ambulation, activity daily living to overcome.            Zechariah Pearson MD      During rounds, used appropriate personal protective equipment including mask and gloves.  Additional gown if indicated.  Mask used was standard procedure mask. Appropriate PPE was worn during the entire visit.  Hand hygiene was completed before and after.

## 2022-08-18 PROCEDURE — 99231 SBSQ HOSP IP/OBS SF/LOW 25: CPT | Performed by: PSYCHIATRY & NEUROLOGY

## 2022-08-18 PROCEDURE — 250N000013 HC RX MED GY IP 250 OP 250 PS 637: Performed by: PSYCHIATRY & NEUROLOGY

## 2022-08-18 PROCEDURE — 124N000003 HC R&B MH SENIOR/ADOLESCENT

## 2022-08-18 RX ADMIN — BUSPIRONE HYDROCHLORIDE 30 MG: 15 TABLET ORAL at 08:01

## 2022-08-18 RX ADMIN — ASPIRIN 81 MG: 81 TABLET, COATED ORAL at 08:01

## 2022-08-18 RX ADMIN — CLOZAPINE 250 MG: 100 TABLET ORAL at 19:18

## 2022-08-18 RX ADMIN — BENZTROPINE MESYLATE 0.5 MG: 0.5 TABLET ORAL at 19:19

## 2022-08-18 RX ADMIN — GABAPENTIN 100 MG: 100 CAPSULE ORAL at 08:01

## 2022-08-18 RX ADMIN — SALMETEROL XINAFOATE 1 PUFF: 50 POWDER, METERED ORAL; RESPIRATORY (INHALATION) at 19:21

## 2022-08-18 RX ADMIN — GABAPENTIN 100 MG: 100 CAPSULE ORAL at 13:12

## 2022-08-18 RX ADMIN — MEGESTROL ACETATE 20 MG: 20 TABLET ORAL at 08:01

## 2022-08-18 RX ADMIN — DONEPEZIL HYDROCHLORIDE 10 MG: 10 TABLET ORAL at 19:19

## 2022-08-18 RX ADMIN — BUSPIRONE HYDROCHLORIDE 30 MG: 15 TABLET ORAL at 19:19

## 2022-08-18 RX ADMIN — SALMETEROL XINAFOATE 1 PUFF: 50 POWDER, METERED ORAL; RESPIRATORY (INHALATION) at 08:01

## 2022-08-18 RX ADMIN — GABAPENTIN 100 MG: 100 CAPSULE ORAL at 17:25

## 2022-08-18 RX ADMIN — BENZTROPINE MESYLATE 0.5 MG: 0.5 TABLET ORAL at 08:01

## 2022-08-18 RX ADMIN — MEMANTINE 10 MG: 10 TABLET ORAL at 08:01

## 2022-08-18 RX ADMIN — MEMANTINE 10 MG: 10 TABLET ORAL at 19:19

## 2022-08-18 RX ADMIN — MIRTAZAPINE 15 MG: 15 TABLET, FILM COATED ORAL at 19:19

## 2022-08-18 RX ADMIN — LEVOTHYROXINE SODIUM 88 MCG: 0.09 TABLET ORAL at 08:01

## 2022-08-18 RX ADMIN — Medication 5 MG: at 19:19

## 2022-08-18 ASSESSMENT — ACTIVITIES OF DAILY LIVING (ADL)
ADLS_ACUITY_SCORE: 75

## 2022-08-18 NOTE — PLAN OF CARE
Problem: Sleep Disturbance (Psychotic Signs/Symptoms)  Goal: Improved Sleep (Psychotic Signs/Symptoms)  Outcome: Ongoing, Progressing   Goal Outcome Evaluation:    Patient slept a total of 8.75 hours. No behavioral concerns noted the whole shift.

## 2022-08-18 NOTE — PLAN OF CARE
"Goal Outcome Evaluation:    Plan of Care Reviewed With: patient        Pt continues to isolate to bed. Appears somnolent. Poor energy and motivation. When asked to get up and come to lounge for dinner, he began to stand up then changed his mind, stating, \"I'm too tired for this shit.\" Dinner was brought to his room. He ate approx 25%. Requires encouragement to drink fluids. Med-compliant. Polite, cooperative, but again declined assistance with taking shower. Continue with current treatment plan and recommendations. Continue to monitor and reassess symptoms. Monitor response to medications. Monitor progress towards treatment goals. Encourage groups and participation.          "

## 2022-08-18 NOTE — PLAN OF CARE
Goal Outcome Evaluation:    Plan of Care Reviewed With: patient     Pt out to lounge for breakfast with strong encouragement, ate 100%. Flat affect, mood calm, pt took all scheduled medications.

## 2022-08-18 NOTE — PROGRESS NOTES
"PSYCHIATRY  PROGRESS NOTE     DATE OF SERVICE   08/18/2022       CHIEF COMPLAINT   Patient was admitted due to inability to safely care for himself and psychosis.       SUBJECTIVE   Nursing reports:   Pt continues to isolate to bed. Appears somnolent. Poor energy and motivation. When asked to get up and come to lounge for dinner, he began to stand up then changed his mind, stating, \"I'm too tired for this shit.\" Dinner was brought to his room. He ate approx 25%. Requires encouragement to drink fluids. Med-compliant. Polite, cooperative, but again declined assistance with taking shower. Continue with current treatment plan and recommendations. Continue to monitor and reassess symptoms. Monitor response to medications. Monitor progress towards treatment goals. Encourage groups and participation.     Patient has been discussed in detail with the  during team meeting.  We continue with no updates about the guardianship process.     OBJECTIVE   Patient was seen and evaluated at bedside by himself, this was a face-to-face evaluation.  Patient continues to be calm and pleasant during my interactions with him.  Patient today was preoccupied with food and he was asking for multiple items.  Patient stated that he is feeling as if he is \"starving\".  I reminded the patient that lunch was going to be in the unit soon.  Patient verbalized good understanding and at this time denies any other issues.  He remains at his baseline and no new behaviors have been reported.       MEDICATIONS   Medications:  Scheduled Meds:    aspirin  81 mg Oral Daily     benztropine  0.5 mg Oral BID     busPIRone HCl  30 mg Oral BID     cloZAPine  250 mg Oral At Bedtime     donepezil  10 mg Oral At Bedtime     gabapentin  100 mg Oral TID w/meals     levothyroxine  88 mcg Oral Daily     megestrol  20 mg Oral Daily     melatonin  5 mg Oral At Bedtime     memantine  10 mg Oral BID     mirtazapine  15 mg Oral At Bedtime     salmeterol  1 puff " "Inhalation BID     Continuous Infusions:  PRN Meds:.albuterol, alum & mag hydroxide-simethicone, benzocaine-menthol, hydrOXYzine, nicotine, nystatin, polyethylene glycol, polyethylene glycol-propylene glycol PF, senna-docusate    Medication adherence issues: MS Med Adherence Y/N: Yes, Hospitalization  Medication side effects: MEDICATION SIDE EFFECTS: no side effects reported  Benefit: Yes / No: Yes       ROS   A comprehensive review of systems was negative.       MENTAL STATUS EXAM   Vitals: /89   Pulse 108   Temp 98  F (36.7  C)   Resp 18   Ht 1.778 m (5' 10\")   Wt 77.3 kg (170 lb 6.7 oz)   SpO2 99%   BMI 24.45 kg/m       Same as last visit  Appearance:  No apparent distress  Mood: \"Feeling all right\"  Affect: Calm  Suicidal Ideation: Denies  Homicidal Ideation: Denies  Thought process: Disorganized and concrete  Thought content: Remains confused and delusional and hallucinating at times.    Fund of Knowledge: Below average  Attention/Concentration: Poor  Language ability: Significantly impaired  Memory: Global memory impairment  Insight:  Poor.  Judgement: Poor  Orientation: Only to person  Psychomotor Behavior: slowed    Muscle Strength and Tone: MuscleStrength: Normal and Atrophy  Gait and Station: Not evaluated       LABS   personally reviewed.     No results found for: PHENYTOIN, PHENOBARB, VALPROATE, CBMZ       DIAGNOSIS   Principal Problem:    Major neurocognitive disorder, due to multiple etiologies, with behavioral disturbance, severe (H)    Active Problem List:  Patient Active Problem List   Diagnosis     TBI with aggressive behavior     HTN (hypertension)     COPD (chronic obstructive pulmonary disease) (H)     Dementia with behavioral disturbance (H)     Chronic schizophrenia (H)     Smoker     Agitation     Behavior disturbance     Psychosis (H)     Major neurocognitive disorder, due to multiple etiologies, with behavioral disturbance, severe (H)     Paranoid schizophrenia, chronic " condition with acute exacerbation (H)     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 Ongoing education given regarding diagnostic and treatment options with risks, benefits and alternatives and adequate verbalization of understanding.  2.  Medications       BuSpar 30 mg 2 times daily       Cogentin 0.5 mg 2 times a day       Clozaril 250 mg at bedtime       Aricept 10 mg at bedtime       Gabapentin 100 mg 3 times a day       Namenda 10 mg 2 times a day       Remeron 15 mg at bedtime       melatonin 5 mg at bedtime  3.  Medical team following the patient   4.   coordinating a safe discharge plan with the patient.  5.  Labs have been ordered, liver functions remained stable consistent with the hep C diagnosis.      Risk Assessment: [facility] RISK ASSESSMENT: Patient able to contract for safety    Coordination of Care:   Treatment Plan reviewed and physician signed, Care discussed with Care/Treatment Team Members, Chart reviewed and Patient seen      Re-Certification I certify that the inpatient psychiatric facility services furnished since the previous certification were, and continue to be, medically necessary for, either, treatment which could reasonably be expected to improve the patient s condition or diagnostic study and that the hospital records indicate that the services furnished were, either, intensive treatment services, admission and related services necessary for diagnostic study, or equivalent services.     I certify that the patient continues to need, on a daily basis, active treatment furnished directly by or requiring the supervision of inpatient psychiatric facility personnel.   I estimate 14 days of hospitalization is necessary for proper treatment of the patient. My plans for post-hospital care for this patient are   TBD     Ginger Dubon MD    -     08/18/2022  -     2:40 PM    Total time 15 minutes with > 50%spent on coordination of cares and psycho-education.    This note was created with help of Dragon dictation system. Grammatical / typing errors are not intentional.    Ginger Dubon MD

## 2022-08-19 LAB
BASOPHILS # BLD AUTO: 0.1 10E3/UL (ref 0–0.2)
BASOPHILS NFR BLD AUTO: 2 %
EOSINOPHIL # BLD AUTO: 1.6 10E3/UL (ref 0–0.7)
EOSINOPHIL NFR BLD AUTO: 27 %
ERYTHROCYTE [DISTWIDTH] IN BLOOD BY AUTOMATED COUNT: 13.9 % (ref 10–15)
HCT VFR BLD AUTO: 42.6 % (ref 40–53)
HGB BLD-MCNC: 15 G/DL (ref 13.3–17.7)
IMM GRANULOCYTES # BLD: 0 10E3/UL
IMM GRANULOCYTES NFR BLD: 0 %
LYMPHOCYTES # BLD AUTO: 1.5 10E3/UL (ref 0.8–5.3)
LYMPHOCYTES NFR BLD AUTO: 25 %
MCH RBC QN AUTO: 32.4 PG (ref 26.5–33)
MCHC RBC AUTO-ENTMCNC: 35.2 G/DL (ref 31.5–36.5)
MCV RBC AUTO: 92 FL (ref 78–100)
MONOCYTES # BLD AUTO: 0.6 10E3/UL (ref 0–1.3)
MONOCYTES NFR BLD AUTO: 9 %
NEUTROPHILS # BLD AUTO: 2.2 10E3/UL (ref 1.6–8.3)
NEUTROPHILS NFR BLD AUTO: 37 %
NRBC # BLD AUTO: 0 10E3/UL
NRBC BLD AUTO-RTO: 0 /100
PLATELET # BLD AUTO: 155 10E3/UL (ref 150–450)
RBC # BLD AUTO: 4.63 10E6/UL (ref 4.4–5.9)
WBC # BLD AUTO: 6 10E3/UL (ref 4–11)

## 2022-08-19 PROCEDURE — 99231 SBSQ HOSP IP/OBS SF/LOW 25: CPT | Performed by: PSYCHIATRY & NEUROLOGY

## 2022-08-19 PROCEDURE — 250N000013 HC RX MED GY IP 250 OP 250 PS 637: Performed by: PSYCHIATRY & NEUROLOGY

## 2022-08-19 PROCEDURE — 124N000003 HC R&B MH SENIOR/ADOLESCENT

## 2022-08-19 PROCEDURE — 85004 AUTOMATED DIFF WBC COUNT: CPT | Performed by: PSYCHIATRY & NEUROLOGY

## 2022-08-19 PROCEDURE — 36415 COLL VENOUS BLD VENIPUNCTURE: CPT | Performed by: PSYCHIATRY & NEUROLOGY

## 2022-08-19 RX ADMIN — MEMANTINE 10 MG: 10 TABLET ORAL at 20:02

## 2022-08-19 RX ADMIN — GABAPENTIN 100 MG: 100 CAPSULE ORAL at 14:07

## 2022-08-19 RX ADMIN — DONEPEZIL HYDROCHLORIDE 10 MG: 10 TABLET ORAL at 20:03

## 2022-08-19 RX ADMIN — MEGESTROL ACETATE 20 MG: 20 TABLET ORAL at 08:17

## 2022-08-19 RX ADMIN — LEVOTHYROXINE SODIUM 88 MCG: 0.09 TABLET ORAL at 08:17

## 2022-08-19 RX ADMIN — CLOZAPINE 250 MG: 100 TABLET ORAL at 20:03

## 2022-08-19 RX ADMIN — BUSPIRONE HYDROCHLORIDE 30 MG: 15 TABLET ORAL at 08:17

## 2022-08-19 RX ADMIN — GABAPENTIN 100 MG: 100 CAPSULE ORAL at 17:21

## 2022-08-19 RX ADMIN — BENZTROPINE MESYLATE 0.5 MG: 0.5 TABLET ORAL at 20:02

## 2022-08-19 RX ADMIN — BENZTROPINE MESYLATE 0.5 MG: 0.5 TABLET ORAL at 08:17

## 2022-08-19 RX ADMIN — ASPIRIN 81 MG: 81 TABLET, COATED ORAL at 08:17

## 2022-08-19 RX ADMIN — GABAPENTIN 100 MG: 100 CAPSULE ORAL at 08:17

## 2022-08-19 RX ADMIN — SALMETEROL XINAFOATE 1 PUFF: 50 POWDER, METERED ORAL; RESPIRATORY (INHALATION) at 08:18

## 2022-08-19 RX ADMIN — BUSPIRONE HYDROCHLORIDE 30 MG: 15 TABLET ORAL at 20:02

## 2022-08-19 RX ADMIN — MIRTAZAPINE 15 MG: 15 TABLET, FILM COATED ORAL at 20:03

## 2022-08-19 RX ADMIN — SALMETEROL XINAFOATE 1 PUFF: 50 POWDER, METERED ORAL; RESPIRATORY (INHALATION) at 20:04

## 2022-08-19 RX ADMIN — Medication 5 MG: at 20:03

## 2022-08-19 RX ADMIN — MEMANTINE 10 MG: 10 TABLET ORAL at 08:18

## 2022-08-19 ASSESSMENT — ACTIVITIES OF DAILY LIVING (ADL)
ADLS_ACUITY_SCORE: 75

## 2022-08-19 NOTE — PLAN OF CARE
Problem: Behavior Regulation Impairment (Psychotic Signs/Symptoms)  Goal: Improved Behavioral Control (Psychotic Signs/Symptoms)  Outcome: Ongoing, Not Progressing   Goal Outcome Evaluation:    Plan of Care Reviewed With: patient     Patient observed talking to himself while staring at the ceiling but he denied visual hallucinations. He denied SI/SIB nor hallucinations. He is calm with a flat affect. He is polite and cooperative. He still looks unkempt despite of the bath he had this morning. He is alert and oriented to self only. He is med compliant. No side effects of meds observed nor reported. He ate 75% of his dinner. Patient is napping majority of the shift.

## 2022-08-19 NOTE — PLAN OF CARE
Care coordination:  - Both [facility] and [facility] are holding a bed for patient.  - The  has not ruled on patient's guardianship case, yet. The  has 90 days to rule.  - Patient continues to stabilize at baseline.    Referrals:  - [facility]  - [facility]  - [place] [facility] (the [place] will not complete the assessment until the  has ruled on the guardianship case).    Barriers to discharge:  - Funding  - Guardianship is needed  - Patient is stable at his baseline. He can not care for himself, needs assist with all ADLs. Patient needs safe discharge plan.

## 2022-08-19 NOTE — PROGRESS NOTES
"PSYCHIATRY  PROGRESS NOTE     DATE OF SERVICE   08/19/2022       CHIEF COMPLAINT   Patient was admitted due to inability to safely care for himself and psychosis.       SUBJECTIVE   Nursing reports:   Patient evaluation continues. Assessed mood,anxiety,thoughts and behavior. Patient is progressing towards goals. Patient is encouraged to participate in groups and assisted to develop healthy coping skills.  Patient denies auditory or visual hallucinations. BP 92/68 (BP Location: Left arm)   Pulse 85   Temp 98.2  F (36.8  C) (Oral)   Resp 16   Ht 1.778 m (5' 10\")   Wt 77.3 kg (170 lb 6.7 oz)   SpO2 96%   BMI 24.45 kg/m      Patient has been discussed in detail with the  during team meeting.  We continue with no updates about the guardianship process.     OBJECTIVE   Patient was seen and evaluated at bedside by himself, this was a face-to-face evaluation.  Patient remains at baseline and no new behaviors have been reported.  He continues to be calm and pleasant.  Remains delusional and responding to internal stimuli.  Denies having any thoughts of harming himself but has been able to contract for safety.       MEDICATIONS   Medications:  Scheduled Meds:    aspirin  81 mg Oral Daily     benztropine  0.5 mg Oral BID     busPIRone HCl  30 mg Oral BID     cloZAPine  250 mg Oral At Bedtime     donepezil  10 mg Oral At Bedtime     gabapentin  100 mg Oral TID w/meals     levothyroxine  88 mcg Oral Daily     megestrol  20 mg Oral Daily     melatonin  5 mg Oral At Bedtime     memantine  10 mg Oral BID     mirtazapine  15 mg Oral At Bedtime     salmeterol  1 puff Inhalation BID     Continuous Infusions:  PRN Meds:.albuterol, alum & mag hydroxide-simethicone, benzocaine-menthol, hydrOXYzine, nicotine, nystatin, polyethylene glycol, polyethylene glycol-propylene glycol PF, senna-docusate    Medication adherence issues: MS Med Adherence Y/N: Yes, Hospitalization  Medication side effects: MEDICATION SIDE EFFECTS: " "no side effects reported  Benefit: Yes / No: Yes       ROS   A comprehensive review of systems was negative.       MENTAL STATUS EXAM   Vitals: BP 92/68 (BP Location: Left arm)   Pulse 85   Temp 98.2  F (36.8  C) (Oral)   Resp 16   Ht 1.778 m (5' 10\")   Wt 77.3 kg (170 lb 6.7 oz)   SpO2 96%   BMI 24.45 kg/m       Same as last visit  Appearance:  No apparent distress  Mood: \"Feeling all right\"  Affect: Calm  Suicidal Ideation: Denies  Homicidal Ideation: Denies  Thought process: Disorganized and concrete  Thought content: Remains confused and delusional and hallucinating at times.    Fund of Knowledge: Below average  Attention/Concentration: Poor  Language ability: Significantly impaired  Memory: Global memory impairment  Insight:  Poor.  Judgement: Poor  Orientation: Only to person  Psychomotor Behavior: slowed    Muscle Strength and Tone: MuscleStrength: Normal and Atrophy  Gait and Station: Not evaluated       LABS   personally reviewed.     No results found for: PHENYTOIN, PHENOBARB, VALPROATE, CBMZ       DIAGNOSIS   Principal Problem:    Major neurocognitive disorder, due to multiple etiologies, with behavioral disturbance, severe (H)    Active Problem List:  Patient Active Problem List   Diagnosis     TBI with aggressive behavior     HTN (hypertension)     COPD (chronic obstructive pulmonary disease) (H)     Dementia with behavioral disturbance (H)     Chronic schizophrenia (H)     Smoker     Agitation     Behavior disturbance     Psychosis (H)     Major neurocognitive disorder, due to multiple etiologies, with behavioral disturbance, severe (H)     Paranoid schizophrenia, chronic condition with acute exacerbation (H)     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 Ongoing education given regarding diagnostic and treatment options with " risks, benefits and alternatives and adequate verbalization of understanding.  2.  Medications       BuSpar 30 mg 2 times daily       Cogentin 0.5 mg 2 times a day       Clozaril 250 mg at bedtime       Aricept 10 mg at bedtime       Gabapentin 100 mg 3 times a day       Namenda 10 mg 2 times a day       Remeron 15 mg at bedtime       melatonin 5 mg at bedtime  3.  Medical team following the patient   4.   coordinating a safe discharge plan with the patient.  5.  Labs have been ordered, liver functions remained stable consistent with the hep C diagnosis.      Risk Assessment: [facility] RISK ASSESSMENT: Patient able to contract for safety    Coordination of Care:   Treatment Plan reviewed and physician signed, Care discussed with Care/Treatment Team Members, Chart reviewed and Patient seen      Re-Certification I certify that the inpatient psychiatric facility services furnished since the previous certification were, and continue to be, medically necessary for, either, treatment which could reasonably be expected to improve the patient s condition or diagnostic study and that the hospital records indicate that the services furnished were, either, intensive treatment services, admission and related services necessary for diagnostic study, or equivalent services.     I certify that the patient continues to need, on a daily basis, active treatment furnished directly by or requiring the supervision of inpatient psychiatric facility personnel.   I estimate 14 days of hospitalization is necessary for proper treatment of the patient. My plans for post-hospital care for this patient are  TBD     Ginger Dubon MD    -     08/19/2022  -     3:50 PM    Total time 15 minutes with > 50%spent on coordination of cares and psycho-education.    This note was created with help of Dragon dictation system. Grammatical / typing errors are not intentional.    Ginger Dubon MD

## 2022-08-19 NOTE — PLAN OF CARE
Problem: Sleep Disturbance (Psychotic Signs/Symptoms)  Goal: Improved Sleep (Psychotic Signs/Symptoms)  Outcome: Ongoing, Progressing   Goal Outcome Evaluation:    Patient slept a total of 9.75 hours without any interruptions. He has not gone to the bathroom to void even once during the night.     No behavioral issues raised the whole shift.

## 2022-08-19 NOTE — PLAN OF CARE
"  Problem: Depression  Goal: Improved Mood  Outcome: Met   Goal Outcome Evaluation:    Plan of Care Reviewed With: patient             Nursing Assessment    Psychosis (H) [F29]    Admit Date: 1/26/2022    Length of Stay: 205    Patient evaluation continues. Assessed mood,anxiety,thoughts and behavior. Patient is progressing towards goals. Patient is encouraged to participate in groups and assisted to develop healthy coping skills.  Patient denies auditory or visual hallucinations. BP 92/68 (BP Location: Left arm)   Pulse 85   Temp 98.2  F (36.8  C) (Oral)   Resp 16   Ht 1.778 m (5' 10\")   Wt 77.3 kg (170 lb 6.7 oz)   SpO2 96%   BMI 24.45 kg/m      Mood: good    Patient reports depression none and reports anxiety no    Affect:flat blunted    Sleep: good    Appetite: good    SI: denies    HI: denies    SIB: denies      Medication Compliance yes    Group participation: no    ADL's: assisted with cares    Fall risk interventions: proper foot wear, orthostatic B/P. redirection    Rinku Score Interventions:none    Discharge planning in process    Refer to daily team meeting notes for individualized plan of care. Nursing will continue to assess.    *Scale is 1-10 and 10 is the worst.           "

## 2022-08-20 PROCEDURE — 250N000013 HC RX MED GY IP 250 OP 250 PS 637: Performed by: PSYCHIATRY & NEUROLOGY

## 2022-08-20 PROCEDURE — 124N000003 HC R&B MH SENIOR/ADOLESCENT

## 2022-08-20 RX ADMIN — MEMANTINE 10 MG: 10 TABLET ORAL at 08:12

## 2022-08-20 RX ADMIN — CLOZAPINE 250 MG: 100 TABLET ORAL at 20:42

## 2022-08-20 RX ADMIN — LEVOTHYROXINE SODIUM 88 MCG: 0.09 TABLET ORAL at 08:11

## 2022-08-20 RX ADMIN — ASPIRIN 81 MG: 81 TABLET, COATED ORAL at 08:11

## 2022-08-20 RX ADMIN — MEGESTROL ACETATE 20 MG: 20 TABLET ORAL at 08:11

## 2022-08-20 RX ADMIN — SALMETEROL XINAFOATE 1 PUFF: 50 POWDER, METERED ORAL; RESPIRATORY (INHALATION) at 20:43

## 2022-08-20 RX ADMIN — BENZTROPINE MESYLATE 0.5 MG: 0.5 TABLET ORAL at 08:11

## 2022-08-20 RX ADMIN — GABAPENTIN 100 MG: 100 CAPSULE ORAL at 18:21

## 2022-08-20 RX ADMIN — DONEPEZIL HYDROCHLORIDE 10 MG: 10 TABLET ORAL at 20:43

## 2022-08-20 RX ADMIN — GABAPENTIN 100 MG: 100 CAPSULE ORAL at 12:05

## 2022-08-20 RX ADMIN — SALMETEROL XINAFOATE 1 PUFF: 50 POWDER, METERED ORAL; RESPIRATORY (INHALATION) at 08:12

## 2022-08-20 RX ADMIN — BUSPIRONE HYDROCHLORIDE 30 MG: 15 TABLET ORAL at 20:42

## 2022-08-20 RX ADMIN — BENZTROPINE MESYLATE 0.5 MG: 0.5 TABLET ORAL at 20:42

## 2022-08-20 RX ADMIN — MEMANTINE 10 MG: 10 TABLET ORAL at 20:42

## 2022-08-20 RX ADMIN — MIRTAZAPINE 15 MG: 15 TABLET, FILM COATED ORAL at 20:42

## 2022-08-20 RX ADMIN — GABAPENTIN 100 MG: 100 CAPSULE ORAL at 08:11

## 2022-08-20 RX ADMIN — Medication 5 MG: at 20:42

## 2022-08-20 RX ADMIN — BUSPIRONE HYDROCHLORIDE 30 MG: 15 TABLET ORAL at 08:11

## 2022-08-20 ASSESSMENT — ACTIVITIES OF DAILY LIVING (ADL)
ADLS_ACUITY_SCORE: 75

## 2022-08-20 NOTE — PLAN OF CARE
"  Problem: Sleep Disturbance (Psychotic Signs/Symptoms)  Goal: Improved Sleep (Psychotic Signs/Symptoms)  Outcome: Ongoing, Progressing   Goal Outcome Evaluation:  Patient has remained in his room sleeping uninterrupted for the entire shift.  Encouraged to use the restroom however patient declined stating, \"Im dry.\"  10.5 hrs of sleep.  Continue with care plan.    "

## 2022-08-20 NOTE — PLAN OF CARE
"Goal Outcome Evaluation:    Plan of Care Reviewed With: patient        Pt declined to get out of bed for dinner stating \"I'm too tired.\" Dinner was brought to his room. He ate approx 25%. Agitated briefly, yelling loudly. When writer approached pt in his room, he stated \"That alcides Lawson said I will never get out of here, not for months and months.\" At time pt was silent, looking away from writer, as if attending to internal stimuli. When asked if Yessenia was speaking to him currently, he stated \"Yeah, but I'm getting the fuck out of here by tonight.\" Calmed down and was quiet shortly after this incident. Med-compliant. Cooperative. Continue with current treatment plan and recommendations. Continue to monitor and reassess symptoms. Monitor response to medications. Monitor progress towards treatment goals. Encourage groups and participation.          "

## 2022-08-20 NOTE — PLAN OF CARE
"  Problem: Behavior Regulation Impairment (Psychotic Signs/Symptoms)  Goal: Improved Behavioral Control (Psychotic Signs/Symptoms)  Outcome: Ongoing, Progressing   Goal Outcome Evaluation:    Plan of Care Reviewed With: patient               Nursing Assessment    Psychosis (H) [F29]    Admit Date: 1/26/2022    Length of Stay: 206    Patient evaluation continues. Assessed mood,anxiety,thoughts and behavior. Patient is  progressing towards goals. Patient is encouraged to participate in groups and assisted to develop healthy coping skills.  Patient admits to  auditory hallucinations. /81   Pulse 73   Temp 97.3  F (36.3  C)   Resp 16   Ht 1.778 m (5' 10\")   Wt 77.3 kg (170 lb 6.7 oz)   SpO2 98%   BMI 24.45 kg/m      Mood:good    Patient reports depression none and reports anxiety none    Affect:flat and blunted    Sleep: 10 hours last night    Appetite: good    SI: denies    HI: denies    SIB: denies      Medication Compliance yes    Group participation:none    ADL's: assisted by staff    Fall risk interventions: proper foot wear, orthostatic B/P standby assist    Rinku Score Interventions:none    Discharge planning in process    Refer to daily team meeting notes for individualized plan of care. Nursing will continue to assess.    *Scale is 1-10 and 10 is the worst.         "

## 2022-08-20 NOTE — PLAN OF CARE
Goal Outcome Evaluation:    Plan of Care Reviewed With: patient        Pt isolative to bed. Affect flat. Minimal verbal responses. Got up for dinner but laid back down immediately stating he felt dizzy. Brought water and encouraged to drink. VSS. After some time, he ambulated to [facility] with another staff. Gait steady at that time. Ate approx 25% of dinner stating he was not very hungry. Ambulated back to room afterwards. Drinks fluids with encouragement. Hygiene poor. Sleep adequate. Med-compliant. Cooperative, polite. Continue with current treatment plan and recommendations. Continue to monitor and reassess symptoms. Monitor response to medications. Monitor progress towards treatment goals. Encourage groups and participation.

## 2022-08-21 PROCEDURE — 124N000003 HC R&B MH SENIOR/ADOLESCENT

## 2022-08-21 PROCEDURE — 250N000013 HC RX MED GY IP 250 OP 250 PS 637: Performed by: PSYCHIATRY & NEUROLOGY

## 2022-08-21 RX ADMIN — BENZTROPINE MESYLATE 0.5 MG: 0.5 TABLET ORAL at 20:22

## 2022-08-21 RX ADMIN — GABAPENTIN 100 MG: 100 CAPSULE ORAL at 08:26

## 2022-08-21 RX ADMIN — CLOZAPINE 250 MG: 100 TABLET ORAL at 20:22

## 2022-08-21 RX ADMIN — MEGESTROL ACETATE 20 MG: 20 TABLET ORAL at 08:26

## 2022-08-21 RX ADMIN — SALMETEROL XINAFOATE 1 PUFF: 50 POWDER, METERED ORAL; RESPIRATORY (INHALATION) at 20:22

## 2022-08-21 RX ADMIN — MEMANTINE 10 MG: 10 TABLET ORAL at 08:26

## 2022-08-21 RX ADMIN — ASPIRIN 81 MG: 81 TABLET, COATED ORAL at 08:26

## 2022-08-21 RX ADMIN — GABAPENTIN 100 MG: 100 CAPSULE ORAL at 13:28

## 2022-08-21 RX ADMIN — SALMETEROL XINAFOATE 1 PUFF: 50 POWDER, METERED ORAL; RESPIRATORY (INHALATION) at 08:26

## 2022-08-21 RX ADMIN — BENZTROPINE MESYLATE 0.5 MG: 0.5 TABLET ORAL at 08:26

## 2022-08-21 RX ADMIN — DONEPEZIL HYDROCHLORIDE 10 MG: 10 TABLET ORAL at 20:22

## 2022-08-21 RX ADMIN — MIRTAZAPINE 15 MG: 15 TABLET, FILM COATED ORAL at 20:22

## 2022-08-21 RX ADMIN — GABAPENTIN 100 MG: 100 CAPSULE ORAL at 17:29

## 2022-08-21 RX ADMIN — Medication 5 MG: at 20:22

## 2022-08-21 RX ADMIN — BUSPIRONE HYDROCHLORIDE 30 MG: 15 TABLET ORAL at 08:25

## 2022-08-21 RX ADMIN — LEVOTHYROXINE SODIUM 88 MCG: 0.09 TABLET ORAL at 08:25

## 2022-08-21 RX ADMIN — MEMANTINE 10 MG: 10 TABLET ORAL at 20:22

## 2022-08-21 RX ADMIN — BUSPIRONE HYDROCHLORIDE 30 MG: 15 TABLET ORAL at 20:22

## 2022-08-21 ASSESSMENT — ACTIVITIES OF DAILY LIVING (ADL)
ADLS_ACUITY_SCORE: 75

## 2022-08-21 NOTE — PLAN OF CARE
"Goal Outcome Evaluation:    Plan of Care Reviewed With: patient       Pt isolative to bed. Affect flat. Minimal verbal responses. Appears to be attending to internal stimuli at times. BP low: 97/66 P 87. Initially declined to get up for dinner but did with further prompting. Writer observed dandruff and flaking skin in bedding, in pt's hair, and on scrubs. When prompted to shower while walking to lounge for dinner, he turned toward writer suddenly and yelled loudly, \"I said I'm all right, leave me the fuck alone!\" Bedding changed while pt was eating dinner. Ate 75% of dinner and drank 120 ml orange juice plus 90 ml water. Med-compliant. BP not rechedked due to pt's irritability and hostility. Continue with current treatment plan and recommendations. Continue to monitor and reassess symptoms. Monitor response to medications. Monitor progress towards treatment goals. Encourage groups and participation.          "

## 2022-08-21 NOTE — PLAN OF CARE
"  Problem: Behavior Regulation Impairment (Psychotic Signs/Symptoms)  Goal: Improved Behavioral Control (Psychotic Signs/Symptoms)  Outcome: Met   Goal Outcome Evaluation:    Plan of Care Reviewed With: patient               Nursing Assessment    Psychosis (H) [F29]    Admit Date: 1/26/2022    Length of Stay: 207    Patient evaluation continues. Assessed mood,anxiety,thoughts and behavior. Patient is  progressing towards goals. Patient is encouraged to participate in groups and assisted to develop healthy coping skills.  Patient admits  auditory hallucinations. BP 96/67 (BP Location: Right arm)   Pulse 87   Temp 97.9  F (36.6  C) (Temporal)   Resp 20   Ht 1.778 m (5' 10\")   Wt 77.7 kg (171 lb 4.8 oz)   SpO2 97%   BMI 24.58 kg/m      Mood: ok    Patient reports depression none and reports anxiety none    Affect:flat blunted    Sleep: 10 hours last night    Appetite: good    SI: denies    HI: denies    SIB: denies      Medication Compliance yes    Group participation:no    ADL's: assisted by staff    Fall risk interventions: proper foot wear, orthostatic B/p standby assist    Rinku Score Interventions:none    Discharge planning in process    Refer to daily team meeting notes for individualized plan of care. Nursing will continue to assess.    *Scale is 1-10 and 10 is the worst.         "

## 2022-08-21 NOTE — PROGRESS NOTES
Pt appeared to be sleeping comfortably with no concerns reported or noted. Total sleep hours 9.5.

## 2022-08-22 LAB — SARS-COV-2 RNA RESP QL NAA+PROBE: NEGATIVE

## 2022-08-22 PROCEDURE — 99231 SBSQ HOSP IP/OBS SF/LOW 25: CPT | Performed by: PSYCHIATRY & NEUROLOGY

## 2022-08-22 PROCEDURE — 250N000013 HC RX MED GY IP 250 OP 250 PS 637: Performed by: PSYCHIATRY & NEUROLOGY

## 2022-08-22 PROCEDURE — 124N000003 HC R&B MH SENIOR/ADOLESCENT

## 2022-08-22 PROCEDURE — U0003 INFECTIOUS AGENT DETECTION BY NUCLEIC ACID (DNA OR RNA); SEVERE ACUTE RESPIRATORY SYNDROME CORONAVIRUS 2 (SARS-COV-2) (CORONAVIRUS DISEASE [COVID-19]), AMPLIFIED PROBE TECHNIQUE, MAKING USE OF HIGH THROUGHPUT TECHNOLOGIES AS DESCRIBED BY CMS-2020-01-R: HCPCS | Performed by: PSYCHIATRY & NEUROLOGY

## 2022-08-22 RX ADMIN — BENZTROPINE MESYLATE 0.5 MG: 0.5 TABLET ORAL at 08:19

## 2022-08-22 RX ADMIN — MEMANTINE 10 MG: 10 TABLET ORAL at 08:19

## 2022-08-22 RX ADMIN — SALMETEROL XINAFOATE 1 PUFF: 50 POWDER, METERED ORAL; RESPIRATORY (INHALATION) at 08:20

## 2022-08-22 RX ADMIN — BUSPIRONE HYDROCHLORIDE 30 MG: 15 TABLET ORAL at 08:20

## 2022-08-22 RX ADMIN — Medication 5 MG: at 19:42

## 2022-08-22 RX ADMIN — ASPIRIN 81 MG: 81 TABLET, COATED ORAL at 08:19

## 2022-08-22 RX ADMIN — MEMANTINE 10 MG: 10 TABLET ORAL at 19:42

## 2022-08-22 RX ADMIN — GABAPENTIN 100 MG: 100 CAPSULE ORAL at 12:37

## 2022-08-22 RX ADMIN — MIRTAZAPINE 15 MG: 15 TABLET, FILM COATED ORAL at 19:42

## 2022-08-22 RX ADMIN — GABAPENTIN 100 MG: 100 CAPSULE ORAL at 17:14

## 2022-08-22 RX ADMIN — LEVOTHYROXINE SODIUM 88 MCG: 0.09 TABLET ORAL at 08:20

## 2022-08-22 RX ADMIN — SALMETEROL XINAFOATE 1 PUFF: 50 POWDER, METERED ORAL; RESPIRATORY (INHALATION) at 19:42

## 2022-08-22 RX ADMIN — CLOZAPINE 250 MG: 100 TABLET ORAL at 19:42

## 2022-08-22 RX ADMIN — BENZTROPINE MESYLATE 0.5 MG: 0.5 TABLET ORAL at 19:42

## 2022-08-22 RX ADMIN — GABAPENTIN 100 MG: 100 CAPSULE ORAL at 08:20

## 2022-08-22 RX ADMIN — MEGESTROL ACETATE 20 MG: 20 TABLET ORAL at 08:20

## 2022-08-22 RX ADMIN — DONEPEZIL HYDROCHLORIDE 10 MG: 10 TABLET ORAL at 19:42

## 2022-08-22 RX ADMIN — BUSPIRONE HYDROCHLORIDE 30 MG: 15 TABLET ORAL at 19:42

## 2022-08-22 ASSESSMENT — ACTIVITIES OF DAILY LIVING (ADL)
ADLS_ACUITY_SCORE: 75

## 2022-08-22 NOTE — PLAN OF CARE
Assessment/Intervention/Current Symptoms and Care Coordination:    -Reviewed pt s chart  -Rounded with team to review pt s plan and progress   Discharge Plan or Goal:    Per CTC  - Both [facility] and [facility] are holding a bed for patient.  - The  has not ruled on patient's guardianship case, yet. The  has 90 days to rule.  - Patient continues to stabilize at baseline.         Barriers to Discharge:  - Funding  - Guardianship is needed  - Patient is stable at his baseline. He can not care for himself, needs assist with all ADLs. Patient needs safe discharge plan.    Referral Status:  - [facility]  - [facility]  - [place] [facility] (the [place] will not complete the assessment until the  has ruled on the guardianship case).      Legal Status:  Court Hold

## 2022-08-22 NOTE — PLAN OF CARE
Problem: Sleep Disturbance (Psychotic Signs/Symptoms)  Goal: Improved Sleep (Psychotic Signs/Symptoms)  Outcome: Ongoing, Progressing   Goal Outcome Evaluation:   Patient has remained in his room sleeping for the entire shift.  Encouraged to use the restroom q 2 hrs.  No episodes of agitation this shift.  10.5 hrs of sleep.

## 2022-08-22 NOTE — PLAN OF CARE
"Goal Outcome Evaluation:    Plan of Care Reviewed With: patient               Nursing Assessment    Psychosis (H) [F29]    Admit Date: 1/26/2022    Length of Stay: 208    Patient evaluation continues. Assessed mood,anxiety,thoughts and behavior. Patient is progressing towards goals. Patient is encouraged to participate in groups and assisted to develop healthy coping skills.  Patient admits to auditory or  hallucinations. /84 (Patient Position: Sitting)   Pulse 102   Temp 97.1  F (36.2  C) (Temporal)   Resp 16   Ht 1.778 m (5' 10\")   Wt 77.7 kg (171 lb 4.8 oz)   SpO2 100%   BMI 24.58 kg/m      Mood: ok    Patient reports depression none and reports anxiety none    Affect: flat blunted but brightens upon approach    Sleep: good at night naps during the day    Appetite: good    SI: denies    HI: denies    SIB: denies      Medication Compliance yes    Group participation: no    ADL's: needs assist of staff    Fall risk interventions: proper foot wear, orthostatic B/P, standby assist     Rinku Score Interventions:none    Discharge planning in process    Refer to daily team meeting notes for individualized plan of care. Nursing will continue to assess.    *Scale is 1-10 and 10 is the worst.         "

## 2022-08-22 NOTE — PROGRESS NOTES
PSYCHIATRY  PROGRESS NOTE     DATE OF SERVICE   08/22/2022       CHIEF COMPLAINT   Patient was admitted due to inability to safely care for himself and psychosis.       SUBJECTIVE   Nursing reports:   Patient remains stable and no new behaviors have been reported    Patient has been discussed in detail with the  during team meeting.  I was notified in the afternoon that the patient guardianship has been approved.  We are waiting to receive the official documents and have honoring choices reviewed them before proceeding with the discharge.     OBJECTIVE   Patient was seen and evaluated at bedside by himself, this was a face-to-face evaluation.  Patient reported that he is feeling tired and denied any other symptoms.  He continues to be withdrawn and spending the entire time in his room.  There has been no other behaviors present.  He continues to be disorganized, delusional and at times hallucinating.       MEDICATIONS   Medications:  Scheduled Meds:    aspirin  81 mg Oral Daily     benztropine  0.5 mg Oral BID     busPIRone HCl  30 mg Oral BID     cloZAPine  250 mg Oral At Bedtime     donepezil  10 mg Oral At Bedtime     gabapentin  100 mg Oral TID w/meals     levothyroxine  88 mcg Oral Daily     megestrol  20 mg Oral Daily     melatonin  5 mg Oral At Bedtime     memantine  10 mg Oral BID     mirtazapine  15 mg Oral At Bedtime     salmeterol  1 puff Inhalation BID     Continuous Infusions:  PRN Meds:.albuterol, alum & mag hydroxide-simethicone, benzocaine-menthol, hydrOXYzine, nicotine, nystatin, polyethylene glycol, polyethylene glycol-propylene glycol PF, senna-docusate    Medication adherence issues: MS Med Adherence Y/N: Yes, Hospitalization  Medication side effects: MEDICATION SIDE EFFECTS: no side effects reported  Benefit: Yes / No: Yes       ROS   A comprehensive review of systems was negative.       MENTAL STATUS EXAM   Vitals: /84 (Patient Position: Sitting)   Pulse 102   Temp 97.1  F  "(36.2  C) (Temporal)   Resp 16   Ht 1.778 m (5' 10\")   Wt 77.7 kg (171 lb 4.8 oz)   SpO2 100%   BMI 24.58 kg/m       Appearance:  No apparent distress  Mood: \"Feeling tired\"  Affect: Calm  Suicidal Ideation: Denies  Homicidal Ideation: Denies  Thought process: Disorganized and concrete  Thought content: Remains confused and delusional and hallucinating at times.    Fund of Knowledge: Below average  Attention/Concentration: Poor  Language ability: Significantly impaired  Memory: Global memory impairment  Insight:  Poor.  Judgement: Poor  Orientation: Only to person  Psychomotor Behavior: slowed    Muscle Strength and Tone: MuscleStrength: Normal and Atrophy  Gait and Station: Not evaluated       LABS   personally reviewed.     No results found for: PHENYTOIN, PHENOBARB, VALPROATE, CBMZ       DIAGNOSIS   Principal Problem:    Major neurocognitive disorder, due to multiple etiologies, with behavioral disturbance, severe (H)    Active Problem List:  Patient Active Problem List   Diagnosis     TBI with aggressive behavior     HTN (hypertension)     COPD (chronic obstructive pulmonary disease) (H)     Dementia with behavioral disturbance (H)     Chronic schizophrenia (H)     Smoker     Agitation     Behavior disturbance     Psychosis (H)     Major neurocognitive disorder, due to multiple etiologies, with behavioral disturbance, severe (H)     Paranoid schizophrenia, chronic condition with acute exacerbation (H)     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 Ongoing education given regarding diagnostic and treatment options with risks, benefits and alternatives and adequate verbalization of understanding.  2.  Medications       BuSpar 30 mg 2 times daily       Cogentin 0.5 mg 2 times a day       Clozaril 250 mg at bedtime       Aricept 10 mg at " bedtime       Gabapentin 100 mg 3 times a day       Namenda 10 mg 2 times a day       Remeron 15 mg at bedtime       melatonin 5 mg at bedtime  3.  Medical team following the patient   4.   coordinating a safe discharge plan with the patient.  5.  Labs have been ordered, liver functions remained stable consistent with the hep C diagnosis.      Risk Assessment: [facility] RISK ASSESSMENT: Patient able to contract for safety    Coordination of Care:   Treatment Plan reviewed and physician signed, Care discussed with Care/Treatment Team Members, Chart reviewed and Patient seen      Re-Certification I certify that the inpatient psychiatric facility services furnished since the previous certification were, and continue to be, medically necessary for, either, treatment which could reasonably be expected to improve the patient s condition or diagnostic study and that the hospital records indicate that the services furnished were, either, intensive treatment services, admission and related services necessary for diagnostic study, or equivalent services.     I certify that the patient continues to need, on a daily basis, active treatment furnished directly by or requiring the supervision of inpatient psychiatric facility personnel.   I estimate 14 days of hospitalization is necessary for proper treatment of the patient. My plans for post-hospital care for this patient are  TBD     Ginger Dubon MD    -     08/22/2022  -     4:07 PM    Total time 15 minutes with > 50%spent on coordination of cares and psycho-education.    This note was created with help of Dragon dictation system. Grammatical / typing errors are not intentional.    Ginger Dubon MD

## 2022-08-23 PROCEDURE — 99231 SBSQ HOSP IP/OBS SF/LOW 25: CPT | Performed by: PSYCHIATRY & NEUROLOGY

## 2022-08-23 PROCEDURE — 250N000013 HC RX MED GY IP 250 OP 250 PS 637: Performed by: PSYCHIATRY & NEUROLOGY

## 2022-08-23 PROCEDURE — 124N000003 HC R&B MH SENIOR/ADOLESCENT

## 2022-08-23 RX ADMIN — CLOZAPINE 250 MG: 100 TABLET ORAL at 20:36

## 2022-08-23 RX ADMIN — GABAPENTIN 100 MG: 100 CAPSULE ORAL at 17:52

## 2022-08-23 RX ADMIN — BUSPIRONE HYDROCHLORIDE 30 MG: 15 TABLET ORAL at 20:36

## 2022-08-23 RX ADMIN — ASPIRIN 81 MG: 81 TABLET, COATED ORAL at 08:27

## 2022-08-23 RX ADMIN — MEMANTINE 10 MG: 10 TABLET ORAL at 20:36

## 2022-08-23 RX ADMIN — GABAPENTIN 100 MG: 100 CAPSULE ORAL at 13:11

## 2022-08-23 RX ADMIN — MIRTAZAPINE 15 MG: 15 TABLET, FILM COATED ORAL at 20:36

## 2022-08-23 RX ADMIN — DONEPEZIL HYDROCHLORIDE 10 MG: 10 TABLET ORAL at 20:36

## 2022-08-23 RX ADMIN — LEVOTHYROXINE SODIUM 88 MCG: 0.09 TABLET ORAL at 08:27

## 2022-08-23 RX ADMIN — MEGESTROL ACETATE 20 MG: 20 TABLET ORAL at 08:27

## 2022-08-23 RX ADMIN — GABAPENTIN 100 MG: 100 CAPSULE ORAL at 08:27

## 2022-08-23 RX ADMIN — BENZTROPINE MESYLATE 0.5 MG: 0.5 TABLET ORAL at 20:36

## 2022-08-23 RX ADMIN — Medication 5 MG: at 20:36

## 2022-08-23 RX ADMIN — SALMETEROL XINAFOATE 1 PUFF: 50 POWDER, METERED ORAL; RESPIRATORY (INHALATION) at 20:36

## 2022-08-23 RX ADMIN — MEMANTINE 10 MG: 10 TABLET ORAL at 08:27

## 2022-08-23 RX ADMIN — BUSPIRONE HYDROCHLORIDE 30 MG: 15 TABLET ORAL at 08:27

## 2022-08-23 RX ADMIN — BENZTROPINE MESYLATE 0.5 MG: 0.5 TABLET ORAL at 08:27

## 2022-08-23 RX ADMIN — SALMETEROL XINAFOATE 1 PUFF: 50 POWDER, METERED ORAL; RESPIRATORY (INHALATION) at 08:27

## 2022-08-23 ASSESSMENT — ACTIVITIES OF DAILY LIVING (ADL)
ADLS_ACUITY_SCORE: 75

## 2022-08-23 NOTE — PLAN OF CARE
Goal Outcome Evaluation:    Plan of Care Reviewed With: patient      Patient slept well the whole night for 9 hours. No complaints made. Nothing unusual noted.

## 2022-08-23 NOTE — PLAN OF CARE
Problem: Behavioral Health Plan of Care  Goal: Plan of Care Review  Outcome: Ongoing, Progressing  Flowsheets (Taken 8/23/2022 0900)  Plan of Care Reviewed With: patient  Patient Agreement with Plan of Care: unable to participate   Goal Outcome Evaluation:    Plan of Care Reviewed With: patient     Patient is calm, affect is flat. Oriented to self, denies concerns at this time. No hallucinations observed this shift. Remains isolative and withdrawn, not social with peers and does not attend group. Good appetite. Ate 75% of meals.

## 2022-08-23 NOTE — PROGRESS NOTES
PSYCHIATRY  PROGRESS NOTE     DATE OF SERVICE   08/23/2022       CHIEF COMPLAINT   Patient was admitted due to inability to safely care for himself and psychosis.       SUBJECTIVE   Nursing reports:   Patient remains isolative and withdrawn. He is calm with a flat affect. He refused to attend groups today. He came out to watch TV for few minutes then went back to his room. While in the lounge, he was observed laughing and talking by himself as if he was responding to internal stimuli. Patient came out again to the dining area for dinner. He ate 75% of food served during dinner time. Patient denied SI/SIB/HI. He had a Covid test late this afternoon and the result was negative. Patient still looks untidy. He refused to take a shower nor bed bath nor even washing his face. He went to the toilet x 2 and voided freely to clear franko colored urine. His BP was 105/75 with a Pulse of 98. He remains tachycardic.    Patient has been discussed in detail with the  during team meeting.   has continued to follow-up on the guardianship documents.  Patient still has not been assigned a guardian.     OBJECTIVE   Patient was seen and evaluated at bedside by himself, this was a face-to-face evaluation.  Patient reported that he is feeling tired today but denies any other symptoms.  He also asks when he will be getting out of the hospital.  I reviewed with the patient that we were able to obtain the guardianship and now we are waiting for him to have a guardian assigned in order to proceed with his discharge.  Patient verbalized poor understanding of this.  He continues to insist that he has a lot of money and he can pay for his own place.  He was also responding to internal stimuli and talking to himself.       MEDICATIONS   Medications:  Scheduled Meds:    aspirin  81 mg Oral Daily     benztropine  0.5 mg Oral BID     busPIRone HCl  30 mg Oral BID     cloZAPine  250 mg Oral At Bedtime     donepezil  10 mg  "Oral At Bedtime     gabapentin  100 mg Oral TID w/meals     levothyroxine  88 mcg Oral Daily     megestrol  20 mg Oral Daily     melatonin  5 mg Oral At Bedtime     memantine  10 mg Oral BID     mirtazapine  15 mg Oral At Bedtime     salmeterol  1 puff Inhalation BID     Continuous Infusions:  PRN Meds:.albuterol, alum & mag hydroxide-simethicone, benzocaine-menthol, hydrOXYzine, nicotine, nystatin, polyethylene glycol, polyethylene glycol-propylene glycol PF, senna-docusate    Medication adherence issues: MS Med Adherence Y/N: Yes, Hospitalization  Medication side effects: MEDICATION SIDE EFFECTS: no side effects reported  Benefit: Yes / No: Yes       ROS   A comprehensive review of systems was negative.       MENTAL STATUS EXAM   Vitals: /66 (BP Location: Left arm, Patient Position: Supine)   Pulse 97   Temp 98.2  F (36.8  C) (Temporal)   Resp 18   Ht 1.778 m (5' 10\")   Wt 77.7 kg (171 lb 4.8 oz)   SpO2 99%   BMI 24.58 kg/m       Same as last visit  Appearance:  No apparent distress  Mood: \"Feeling tired\"  Affect: Calm  Suicidal Ideation: Denies  Homicidal Ideation: Denies  Thought process: Disorganized and concrete  Thought content: Remains confused and delusional and hallucinating at times.    Fund of Knowledge: Below average  Attention/Concentration: Poor  Language ability: Significantly impaired  Memory: Global memory impairment  Insight:  Poor.  Judgement: Poor  Orientation: Only to person  Psychomotor Behavior: slowed    Muscle Strength and Tone: MuscleStrength: Normal and Atrophy  Gait and Station: Not evaluated       LABS   personally reviewed.     No results found for: PHENYTOIN, PHENOBARB, VALPROATE, CBMZ       DIAGNOSIS   Principal Problem:    Major neurocognitive disorder, due to multiple etiologies, with behavioral disturbance, severe (H)    Active Problem List:  Patient Active Problem List   Diagnosis     TBI with aggressive behavior     HTN (hypertension)     COPD (chronic obstructive " pulmonary disease) (H)     Dementia with behavioral disturbance (H)     Chronic schizophrenia (H)     Smoker     Agitation     Behavior disturbance     Psychosis (H)     Major neurocognitive disorder, due to multiple etiologies, with behavioral disturbance, severe (H)     Paranoid schizophrenia, chronic condition with acute exacerbation (H)     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 Ongoing education given regarding diagnostic and treatment options with risks, benefits and alternatives and adequate verbalization of understanding.  2.  Medications       BuSpar 30 mg 2 times daily       Cogentin 0.5 mg 2 times a day       Clozaril 250 mg at bedtime       Aricept 10 mg at bedtime       Gabapentin 100 mg 3 times a day       Namenda 10 mg 2 times a day       Remeron 15 mg at bedtime       melatonin 5 mg at bedtime  3.  Medical team following the patient   4.   coordinating a safe discharge plan with the patient.  5.  Labs have been ordered, liver functions remained stable consistent with the hep C diagnosis.      Risk Assessment: [facility] RISK ASSESSMENT: Patient able to contract for safety    Coordination of Care:   Treatment Plan reviewed and physician signed, Care discussed with Care/Treatment Team Members, Chart reviewed and Patient seen      Re-Certification I certify that the inpatient psychiatric facility services furnished since the previous certification were, and continue to be, medically necessary for, either, treatment which could reasonably be expected to improve the patient s condition or diagnostic study and that the hospital records indicate that the services furnished were, either, intensive treatment services, admission and related services necessary for diagnostic study, or equivalent services.     I certify that the  patient continues to need, on a daily basis, active treatment furnished directly by or requiring the supervision of inpatient psychiatric facility personnel.   I estimate 14 days of hospitalization is necessary for proper treatment of the patient. My plans for post-hospital care for this patient are  TBD     Ginger Dubon MD    -     08/23/2022  -     2:55 PM    Total time 15 minutes with > 50%spent on coordination of cares and psycho-education.    This note was created with help of Dragon dictation system. Grammatical / typing errors are not intentional.    Ginger Dubon MD

## 2022-08-23 NOTE — PLAN OF CARE
Assessment/Intervention/Current Symptoms and Care Coordination:     -Reviewed pt s chart  -Rounded with team to review pt s plan and progress   -Contacted [facility] (Phone: [phone]. Fax: [phone]) to inquire on what is needed on their part, given that the order for guardianship has been made: Intake staff (Tara) requested this writer to send her the pt's clinical documents for another review. Writer faxed pt's information to Tara ([facility]). Also requested that the [facility]  be asked to reach her.  -[facility]  ( and requested activation of assessment, given that the order for guardianship has been made.    UPDATE: Received a call from [place] [facility] staff, following a referral that was made by writer. Staff (Carlo - [phone]) reported that they are unable to accept the referral because:  1. He has an MA and has been since 2016  2. Has a managed care through [employer].  Carlo said she is declining the referral and will communicate to [facility] to take service the patient     Discharge Plan or Goal:    - Received information this morning that the   has ruled on this patient's guardianship: Final steps towards obtaining a letter of guardianship is in motion.  -Awaiting to receive the guardianship order and letter of guardianship  - Patient continues to stabilize at baseline.      Barriers to Discharge:  - Funding  - Receipt of guardianship order is expected.    - Patient is stable at his baseline. He can not care for himself, needs assist with all ADLs. Patient needs safe discharge plan.     Referral Status:  - [facility]  - [facility]  - [place] [facility] ([place] will complete the assessment since the  has ruled on guardianship      Legal Status:  Court Hold

## 2022-08-23 NOTE — PLAN OF CARE
Problem: Decreased Participation and Engagement (Psychotic Signs/Symptoms)  Goal: Increased Participation and Engagement (Psychotic Signs/Symptoms)  Outcome: Ongoing, Not Progressing  Intervention: Facilitate Participation and Engagement  Recent Flowsheet Documentation  Taken 8/22/2022 1824 by Jonelle Delatorre RN  Supportive Measures:    decision-making supported    positive reinforcement provided    verbalization of feelings encouraged  Diversional Activity: (pt declined to come out of the roonm) other (see comments)     Problem: Decreased Participation and Engagement (Psychotic Signs/Symptoms)  Goal: Increased Participation and Engagement (Psychotic Signs/Symptoms)  Intervention: Facilitate Participation and Engagement  Recent Flowsheet Documentation  Taken 8/22/2022 1824 by Jonelle Delatorre RN  Supportive Measures:    decision-making supported    positive reinforcement provided    verbalization of feelings encouraged  Diversional Activity: (pt declined to come out of the roonm) other (see comments)   Goal Outcome Evaluation:    Plan of Care Reviewed With: patient     Patient remains isolative and withdrawn. He is calm with a flat affect. He refused to attend groups today. He came out to watch TV for few minutes then went back to his room. While in the lounge, he was observed laughing and talking by himself as if he was responding to internal stimuli. Patient came out again to the dining area for dinner. He ate 75% of food served during dinner time. Patient denied SI/SIB/HI. He had a Covid test late this afternoon and the result was negative. Patient still looks untidy. He refused to take a shower nor bed bath nor even washing his face. He went to the toilet x 2 and voided freely to clear franko colored urine. His BP was 105/75 with a Pulse of 98. He remains tachycardic.

## 2022-08-24 PROCEDURE — 124N000003 HC R&B MH SENIOR/ADOLESCENT

## 2022-08-24 PROCEDURE — 250N000013 HC RX MED GY IP 250 OP 250 PS 637: Performed by: PSYCHIATRY & NEUROLOGY

## 2022-08-24 PROCEDURE — 99231 SBSQ HOSP IP/OBS SF/LOW 25: CPT | Performed by: PSYCHIATRY & NEUROLOGY

## 2022-08-24 RX ADMIN — DONEPEZIL HYDROCHLORIDE 10 MG: 10 TABLET ORAL at 20:31

## 2022-08-24 RX ADMIN — BENZTROPINE MESYLATE 0.5 MG: 0.5 TABLET ORAL at 08:26

## 2022-08-24 RX ADMIN — SALMETEROL XINAFOATE 1 PUFF: 50 POWDER, METERED ORAL; RESPIRATORY (INHALATION) at 20:31

## 2022-08-24 RX ADMIN — CLOZAPINE 250 MG: 100 TABLET ORAL at 20:31

## 2022-08-24 RX ADMIN — BUSPIRONE HYDROCHLORIDE 30 MG: 15 TABLET ORAL at 20:31

## 2022-08-24 RX ADMIN — GABAPENTIN 100 MG: 100 CAPSULE ORAL at 08:26

## 2022-08-24 RX ADMIN — SALMETEROL XINAFOATE 1 PUFF: 50 POWDER, METERED ORAL; RESPIRATORY (INHALATION) at 08:26

## 2022-08-24 RX ADMIN — GABAPENTIN 100 MG: 100 CAPSULE ORAL at 17:49

## 2022-08-24 RX ADMIN — MEMANTINE 10 MG: 10 TABLET ORAL at 08:26

## 2022-08-24 RX ADMIN — Medication 5 MG: at 20:32

## 2022-08-24 RX ADMIN — MEGESTROL ACETATE 20 MG: 20 TABLET ORAL at 08:26

## 2022-08-24 RX ADMIN — BUSPIRONE HYDROCHLORIDE 30 MG: 15 TABLET ORAL at 08:26

## 2022-08-24 RX ADMIN — ASPIRIN 81 MG: 81 TABLET, COATED ORAL at 08:26

## 2022-08-24 RX ADMIN — MEMANTINE 10 MG: 10 TABLET ORAL at 20:31

## 2022-08-24 RX ADMIN — MIRTAZAPINE 15 MG: 15 TABLET, FILM COATED ORAL at 20:31

## 2022-08-24 RX ADMIN — LEVOTHYROXINE SODIUM 88 MCG: 0.09 TABLET ORAL at 08:26

## 2022-08-24 RX ADMIN — BENZTROPINE MESYLATE 0.5 MG: 0.5 TABLET ORAL at 20:32

## 2022-08-24 RX ADMIN — GABAPENTIN 100 MG: 100 CAPSULE ORAL at 12:40

## 2022-08-24 ASSESSMENT — ACTIVITIES OF DAILY LIVING (ADL)
ADLS_ACUITY_SCORE: 75

## 2022-08-24 NOTE — PLAN OF CARE
Problem: Behavioral Health Plan of Care  Goal: Plan of Care Review  8/24/2022 1336 by Juarez Murphy, RN  Outcome: Ongoing, Progressing  8/24/2022 1335 by Juarez Murphy, RN  Outcome: Ongoing, Not Progressing  Flowsheets (Taken 8/24/2022 1213)  Plan of Care Reviewed With: patient  Patient Agreement with Plan of Care: unable to participate   Goal Outcome Evaluation:    Plan of Care Reviewed With: patient     Patient is calm and cooperative, medication compliant. Denies MH symptoms, no observed hallucinations. Patient is visible for meals, otherwise isolates in room. Declined shower but was assisted in changing scrub top. No reports of pain or any concerns.

## 2022-08-24 NOTE — PLAN OF CARE
Goal Outcome Evaluation:    Plan of Care Reviewed With: patient        Pt came to lounge for dinner. Otherwise isolative to bed. Affect blunted. Calm. Declines to participate in unit activities or therapeutic groups. Speech limited to one or two word responses. Poverty of thought. Appears to be attending to internal stimuli at times. Med-compliant. Continue with current treatment plan and recommendations. Continue to monitor and reassess symptoms. Monitor response to medications. Monitor progress towards treatment goals. Encourage groups and participation.

## 2022-08-24 NOTE — PROGRESS NOTES
PSYCHIATRY  PROGRESS NOTE     DATE OF SERVICE   08/24/2022       CHIEF COMPLAINT   Patient was admitted due to inability to safely care for himself and psychosis.       SUBJECTIVE   Nursing reports:   Pt came to lounge for dinner. Otherwise isolative to bed. Affect blunted. Calm. Declines to participate in unit activities or therapeutic groups. Speech limited to one or two word responses. Poverty of thought. Appears to be attending to internal stimuli at times. Med-compliant. Continue with current treatment plan and recommendations. Continue to monitor and reassess symptoms. Monitor response to medications. Monitor progress towards treatment goals. Encourage groups and participation.     Patient has been discussed in detail with the  during team meeting.   has continued to follow-up on the guardianship documents.  Patient still has not been assigned a guardian.     OBJECTIVE   Patient was seen and evaluated at bedside by himself, this was a face-to-face evaluation.  Patient reported that he is doing well and he was preoccupied with food.  Patient was asking about his discharge and what is going to happen with all of his money.  I discussed with the patient that the  will keep him an update on this situation.  He continues to be confused, delusional and at times hallucinating.       MEDICATIONS   Medications:  Scheduled Meds:    aspirin  81 mg Oral Daily     benztropine  0.5 mg Oral BID     busPIRone HCl  30 mg Oral BID     cloZAPine  250 mg Oral At Bedtime     donepezil  10 mg Oral At Bedtime     gabapentin  100 mg Oral TID w/meals     levothyroxine  88 mcg Oral Daily     megestrol  20 mg Oral Daily     melatonin  5 mg Oral At Bedtime     memantine  10 mg Oral BID     mirtazapine  15 mg Oral At Bedtime     salmeterol  1 puff Inhalation BID     Continuous Infusions:  PRN Meds:.albuterol, alum & mag hydroxide-simethicone, benzocaine-menthol, hydrOXYzine, nicotine, nystatin,  "polyethylene glycol, polyethylene glycol-propylene glycol PF, senna-docusate    Medication adherence issues: MS Med Adherence Y/N: Yes, Hospitalization  Medication side effects: MEDICATION SIDE EFFECTS: no side effects reported  Benefit: Yes / No: Yes       ROS   A comprehensive review of systems was negative.       MENTAL STATUS EXAM   Vitals: /81   Pulse 105   Temp 97.3  F (36.3  C) (Temporal)   Resp 16   Ht 1.778 m (5' 10\")   Wt 77.7 kg (171 lb 4.8 oz)   SpO2 100%   BMI 24.58 kg/m       Same as last visit  Appearance:  No apparent distress  Mood: \"Feeling tired\"  Affect: Calm  Suicidal Ideation: Denies  Homicidal Ideation: Denies  Thought process: Disorganized and concrete  Thought content: Remains confused and delusional and hallucinating at times.    Fund of Knowledge: Below average  Attention/Concentration: Poor  Language ability: Significantly impaired  Memory: Global memory impairment  Insight:  Poor.  Judgement: Poor  Orientation: Only to person  Psychomotor Behavior: slowed    Muscle Strength and Tone: MuscleStrength: Normal and Atrophy  Gait and Station: Not evaluated       LABS   personally reviewed.     No results found for: PHENYTOIN, PHENOBARB, VALPROATE, CBMZ       DIAGNOSIS   Principal Problem:    Major neurocognitive disorder, due to multiple etiologies, with behavioral disturbance, severe (H)    Active Problem List:  Patient Active Problem List   Diagnosis     TBI with aggressive behavior     HTN (hypertension)     COPD (chronic obstructive pulmonary disease) (H)     Dementia with behavioral disturbance (H)     Chronic schizophrenia (H)     Smoker     Agitation     Behavior disturbance     Psychosis (H)     Major neurocognitive disorder, due to multiple etiologies, with behavioral disturbance, severe (H)     Paranoid schizophrenia, chronic condition with acute exacerbation (H)     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 Ongoing education given regarding diagnostic and treatment options with risks, benefits and alternatives and adequate verbalization of understanding.  2.  Medications       BuSpar 30 mg 2 times daily       Cogentin 0.5 mg 2 times a day       Clozaril 250 mg at bedtime       Aricept 10 mg at bedtime       Gabapentin 100 mg 3 times a day       Namenda 10 mg 2 times a day       Remeron 15 mg at bedtime       melatonin 5 mg at bedtime  3.  Medical team following the patient   4.   coordinating a safe discharge plan with the patient.  5.  Labs have been ordered, liver functions remained stable consistent with the hep C diagnosis.      Risk Assessment: [facility] RISK ASSESSMENT: Patient able to contract for safety    Coordination of Care:   Treatment Plan reviewed and physician signed, Care discussed with Care/Treatment Team Members, Chart reviewed and Patient seen      Re-Certification I certify that the inpatient psychiatric facility services furnished since the previous certification were, and continue to be, medically necessary for, either, treatment which could reasonably be expected to improve the patient s condition or diagnostic study and that the hospital records indicate that the services furnished were, either, intensive treatment services, admission and related services necessary for diagnostic study, or equivalent services.     I certify that the patient continues to need, on a daily basis, active treatment furnished directly by or requiring the supervision of inpatient psychiatric facility personnel.   I estimate 14 days of hospitalization is necessary for proper treatment of the patient. My plans for post-hospital care for this patient are  TBD     Ginger Dubon MD    -     08/24/2022  -     3:48 PM    Total time 15 minutes with > 50%spent on coordination of cares and  psycho-education.    This note was created with help of Dragon dictation system. Grammatical / typing errors are not intentional.    Ginegr Dubon MD

## 2022-08-24 NOTE — PLAN OF CARE
Problem: Sleep Disturbance (Psychotic Signs/Symptoms)  Goal: Improved Sleep (Psychotic Signs/Symptoms)  Outcome: Ongoing, Progressing   Goal Outcome Evaluation:    Patient slept  a total of of 9.5 hours. No behavioral issues noted the whole night.

## 2022-08-25 PROCEDURE — 250N000013 HC RX MED GY IP 250 OP 250 PS 637: Performed by: PSYCHIATRY & NEUROLOGY

## 2022-08-25 PROCEDURE — 124N000003 HC R&B MH SENIOR/ADOLESCENT

## 2022-08-25 RX ADMIN — BENZTROPINE MESYLATE 0.5 MG: 0.5 TABLET ORAL at 20:10

## 2022-08-25 RX ADMIN — SALMETEROL XINAFOATE 1 PUFF: 50 POWDER, METERED ORAL; RESPIRATORY (INHALATION) at 20:10

## 2022-08-25 RX ADMIN — GABAPENTIN 100 MG: 100 CAPSULE ORAL at 14:00

## 2022-08-25 RX ADMIN — ASPIRIN 81 MG: 81 TABLET, COATED ORAL at 08:17

## 2022-08-25 RX ADMIN — MIRTAZAPINE 15 MG: 15 TABLET, FILM COATED ORAL at 20:10

## 2022-08-25 RX ADMIN — BUSPIRONE HYDROCHLORIDE 30 MG: 15 TABLET ORAL at 08:16

## 2022-08-25 RX ADMIN — MEGESTROL ACETATE 20 MG: 20 TABLET ORAL at 08:17

## 2022-08-25 RX ADMIN — SALMETEROL XINAFOATE 1 PUFF: 50 POWDER, METERED ORAL; RESPIRATORY (INHALATION) at 08:17

## 2022-08-25 RX ADMIN — MEMANTINE 10 MG: 10 TABLET ORAL at 08:17

## 2022-08-25 RX ADMIN — BUSPIRONE HYDROCHLORIDE 30 MG: 15 TABLET ORAL at 20:10

## 2022-08-25 RX ADMIN — LEVOTHYROXINE SODIUM 88 MCG: 0.09 TABLET ORAL at 08:17

## 2022-08-25 RX ADMIN — GABAPENTIN 100 MG: 100 CAPSULE ORAL at 20:10

## 2022-08-25 RX ADMIN — DONEPEZIL HYDROCHLORIDE 10 MG: 10 TABLET ORAL at 20:10

## 2022-08-25 RX ADMIN — BENZTROPINE MESYLATE 0.5 MG: 0.5 TABLET ORAL at 08:17

## 2022-08-25 RX ADMIN — MEMANTINE 10 MG: 10 TABLET ORAL at 20:10

## 2022-08-25 RX ADMIN — CLOZAPINE 250 MG: 100 TABLET ORAL at 20:10

## 2022-08-25 RX ADMIN — GABAPENTIN 100 MG: 100 CAPSULE ORAL at 08:17

## 2022-08-25 RX ADMIN — Medication 5 MG: at 20:10

## 2022-08-25 ASSESSMENT — ACTIVITIES OF DAILY LIVING (ADL)
ADLS_ACUITY_SCORE: 75
ADLS_ACUITY_SCORE: 75
ADLS_ACUITY_SCORE: 76
ADLS_ACUITY_SCORE: 75

## 2022-08-25 NOTE — PLAN OF CARE
Problem: Sleep Disturbance (Psychotic Signs/Symptoms)  Goal: Improved Sleep (Psychotic Signs/Symptoms)  Outcome: Ongoing, Progressing   Goal Outcome Evaluation:    Patient slept a total of 10 hours. No behavioral issues noted the whole night.

## 2022-08-25 NOTE — PLAN OF CARE
Assessment/Intervention/Current Symptoms and Care Coordination:     -Reviewed pt s chart  -Per Celia at [facility], they are waiting to verify guardianship after which they can do [place] [facility] [place], per email of yesterday.  - Contacted the  and she said that the court had requested some filings to occur before it can issue the letters for guardianship (and a guardian cannot take actions until the letters have been issued). Currently, the filings have been sent and now waiting for the letters to be issued.     - Completed senior linkage form received and sent back to [facility] for coordination (Joi Roque).    As of yesterday, the status was  Received a call from [place] [facility] staff, following a referral that was made by writer. Staff (Carlo - [phone]) reported that they are unable to accept the referral because:  1. He has an MA and has been since 2016  2. Has a managed care through [employer].  Carlo said she is declining the referral and will communicate to [facility] to take service the patient      Discharge Plan or Goal:     - Received information this morning that the   has ruled on this patient's guardianship: Final steps towards obtaining a letter of guardianship is in motion.  -Awaiting to receive the guardianship order and letter of guardianship  - Patient continues to stabilize at baseline.      Barriers to Discharge:  - Funding  - Receipt of guardianship order is expected.    - Patient is stable at his baseline. He can not care for himself, needs assist with all ADLs. Patient needs safe discharge plan.     Referral Status:  - [facility]  - [place] [facility] ([place] will complete the assessment since the  has ruled on guardianship      Legal Status:  Court Hold    Contact:  Joi Roque  [facility]   [facility]  Phone: [phone]    Fax:  [phone]  Monday -Thursday 6:30 AM-4:00 PM,   Friday 6:30-10:30 AM

## 2022-08-25 NOTE — PROGRESS NOTES
Patient remains stable, no new behaviors have been reported.   continues to follow up with the county in order to get the the guardianship documents.  Guardian needs to be assigned and [place] [facility] [place] needs to be completed before we can proceed with discharging the patient.    Ginger Dubon MD

## 2022-08-25 NOTE — PLAN OF CARE
"  Problem: Behavior Regulation Impairment (Psychotic Signs/Symptoms)  Goal: Improved Behavioral Control (Psychotic Signs/Symptoms)  Outcome: Ongoing, Progressing   Goal Outcome Evaluation:    Plan of Care Reviewed With: patient        Nursing Assessment    Psychosis (H) [F29]    Admit Date: 1/26/2022    Length of Stay: 211    Patient evaluation continues. Assessed mood,anxiety,thoughts and behavior. Patient is progressing towards goals. Patient is encouraged to participate in groups and assisted to develop healthy coping skills.  Patient admits to auditory  hallucinations. BP 99/69   Pulse 90   Temp 96.8  F (36  C) (Temporal)   Resp 16   Ht 1.778 m (5' 10\")   Wt 77.7 kg (171 lb 4.8 oz)   SpO2 97%   BMI 24.58 kg/m      Mood: ok    Patient reports depression none and reports anxiety a little    Affect:flat blunted    Sleep: good    Appetite: good    SI: denies    HI: denies    SIB: denies      Medication Compliance yes    Group participation: no    ADL's: with assist of staff    Fall risk interventions: proper foot wear, orthostatic B/P standby assist    Rinku Score Interventions:none    Discharge planning in process    Refer to daily team meeting notes for individualized plan of care. Nursing will continue to assess.    *Scale is 1-10 and 10 is the worst.                "

## 2022-08-25 NOTE — PLAN OF CARE
Goal Outcome Evaluation:    Plan of Care Reviewed With: patient        Pt declined to come to [facility] for dinner, stating he was not hungry. Dinner tray brought to room but pt appeared to eat only approx 25%. Affect blunted. Calm. Declines to participate in unit activities or therapeutic groups. Speech limited to one or two word responses. Poverty of thought. Appears to be attending to internal stimuli at times. Med-compliant. Continue with current treatment plan and recommendations. Continue to monitor and reassess symptoms. Monitor response to medications. Monitor progress towards treatment goals. Encourage groups and participation.

## 2022-08-26 LAB
BASOPHILS # BLD AUTO: 0.1 10E3/UL (ref 0–0.2)
BASOPHILS NFR BLD AUTO: 1 %
EOSINOPHIL # BLD AUTO: 1.6 10E3/UL (ref 0–0.7)
EOSINOPHIL NFR BLD AUTO: 25 %
ERYTHROCYTE [DISTWIDTH] IN BLOOD BY AUTOMATED COUNT: 14.2 % (ref 10–15)
HCT VFR BLD AUTO: 42.4 % (ref 40–53)
HGB BLD-MCNC: 15 G/DL (ref 13.3–17.7)
IMM GRANULOCYTES # BLD: 0 10E3/UL
IMM GRANULOCYTES NFR BLD: 1 %
LYMPHOCYTES # BLD AUTO: 1.7 10E3/UL (ref 0.8–5.3)
LYMPHOCYTES NFR BLD AUTO: 28 %
MCH RBC QN AUTO: 32.4 PG (ref 26.5–33)
MCHC RBC AUTO-ENTMCNC: 35.4 G/DL (ref 31.5–36.5)
MCV RBC AUTO: 92 FL (ref 78–100)
MONOCYTES # BLD AUTO: 0.6 10E3/UL (ref 0–1.3)
MONOCYTES NFR BLD AUTO: 10 %
NEUTROPHILS # BLD AUTO: 2.2 10E3/UL (ref 1.6–8.3)
NEUTROPHILS NFR BLD AUTO: 35 %
NRBC # BLD AUTO: 0 10E3/UL
NRBC BLD AUTO-RTO: 0 /100
PLATELET # BLD AUTO: 146 10E3/UL (ref 150–450)
RBC # BLD AUTO: 4.63 10E6/UL (ref 4.4–5.9)
WBC # BLD AUTO: 6.2 10E3/UL (ref 4–11)

## 2022-08-26 PROCEDURE — 36415 COLL VENOUS BLD VENIPUNCTURE: CPT | Performed by: PSYCHIATRY & NEUROLOGY

## 2022-08-26 PROCEDURE — 85025 COMPLETE CBC W/AUTO DIFF WBC: CPT | Performed by: PSYCHIATRY & NEUROLOGY

## 2022-08-26 PROCEDURE — 250N000013 HC RX MED GY IP 250 OP 250 PS 637: Performed by: PSYCHIATRY & NEUROLOGY

## 2022-08-26 PROCEDURE — 99231 SBSQ HOSP IP/OBS SF/LOW 25: CPT | Performed by: PSYCHIATRY & NEUROLOGY

## 2022-08-26 PROCEDURE — 124N000003 HC R&B MH SENIOR/ADOLESCENT

## 2022-08-26 RX ADMIN — Medication 5 MG: at 19:42

## 2022-08-26 RX ADMIN — SALMETEROL XINAFOATE 1 PUFF: 50 POWDER, METERED ORAL; RESPIRATORY (INHALATION) at 19:48

## 2022-08-26 RX ADMIN — GABAPENTIN 100 MG: 100 CAPSULE ORAL at 17:40

## 2022-08-26 RX ADMIN — SALMETEROL XINAFOATE 1 PUFF: 50 POWDER, METERED ORAL; RESPIRATORY (INHALATION) at 08:15

## 2022-08-26 RX ADMIN — BUSPIRONE HYDROCHLORIDE 30 MG: 15 TABLET ORAL at 08:15

## 2022-08-26 RX ADMIN — BUSPIRONE HYDROCHLORIDE 30 MG: 15 TABLET ORAL at 19:42

## 2022-08-26 RX ADMIN — MEMANTINE 10 MG: 10 TABLET ORAL at 19:42

## 2022-08-26 RX ADMIN — BENZTROPINE MESYLATE 0.5 MG: 0.5 TABLET ORAL at 08:15

## 2022-08-26 RX ADMIN — MEMANTINE 10 MG: 10 TABLET ORAL at 08:15

## 2022-08-26 RX ADMIN — GABAPENTIN 100 MG: 100 CAPSULE ORAL at 13:48

## 2022-08-26 RX ADMIN — MIRTAZAPINE 15 MG: 15 TABLET, FILM COATED ORAL at 19:42

## 2022-08-26 RX ADMIN — CLOZAPINE 250 MG: 100 TABLET ORAL at 19:42

## 2022-08-26 RX ADMIN — GABAPENTIN 100 MG: 100 CAPSULE ORAL at 08:15

## 2022-08-26 RX ADMIN — LEVOTHYROXINE SODIUM 88 MCG: 0.09 TABLET ORAL at 08:15

## 2022-08-26 RX ADMIN — BENZTROPINE MESYLATE 0.5 MG: 0.5 TABLET ORAL at 19:42

## 2022-08-26 RX ADMIN — ASPIRIN 81 MG: 81 TABLET, COATED ORAL at 08:15

## 2022-08-26 RX ADMIN — MEGESTROL ACETATE 20 MG: 20 TABLET ORAL at 08:15

## 2022-08-26 RX ADMIN — DONEPEZIL HYDROCHLORIDE 10 MG: 10 TABLET ORAL at 19:42

## 2022-08-26 ASSESSMENT — ACTIVITIES OF DAILY LIVING (ADL)
ADLS_ACUITY_SCORE: 76
ADLS_ACUITY_SCORE: 75
ADLS_ACUITY_SCORE: 76
ADLS_ACUITY_SCORE: 75
ADLS_ACUITY_SCORE: 75
ADLS_ACUITY_SCORE: 76
ADLS_ACUITY_SCORE: 76

## 2022-08-26 NOTE — PROGRESS NOTES
"PSYCHIATRY  PROGRESS NOTE     DATE OF SERVICE   08/26/2022       CHIEF COMPLAINT   Patient was admitted due to inability to safely care for himself and psychosis.       SUBJECTIVE   Nursing reports:   Pt declined to come to [facility] for dinner, stating \"I don't want to walk.\" Dinner tray brought to room and pt appeared to eat approx 50%. Affect blunted. Calm. Declines to participate in unit activities or therapeutic groups. Speech limited to one or two word responses. Poverty of thought. Appears to be attending to internal stimuli at times. Med-compliant. Continue with current treatment plan and recommendations. Continue to monitor and reassess symptoms. Monitor response to medications. Monitor progress towards treatment goals. Encourage groups and participation.     Patient has been discussed in detail with the  during team meeting.   has continued to follow-up on the guardianship documents.  Patient still has not been assigned a guardian.     OBJECTIVE   Patient was seen and evaluated at bedside by himself, this was a face-to-face evaluation.  Patient reported that he is doing fine and at this time denies all psychiatric symptoms.  Remains calm, cooperative and isolative.  He is delusional and continues to respond to internal stimuli.  This is part of patient's baseline.       MEDICATIONS   Medications:  Scheduled Meds:    aspirin  81 mg Oral Daily     benztropine  0.5 mg Oral BID     busPIRone HCl  30 mg Oral BID     cloZAPine  250 mg Oral At Bedtime     donepezil  10 mg Oral At Bedtime     gabapentin  100 mg Oral TID w/meals     levothyroxine  88 mcg Oral Daily     megestrol  20 mg Oral Daily     melatonin  5 mg Oral At Bedtime     memantine  10 mg Oral BID     mirtazapine  15 mg Oral At Bedtime     salmeterol  1 puff Inhalation BID     Continuous Infusions:  PRN Meds:.albuterol, alum & mag hydroxide-simethicone, benzocaine-menthol, hydrOXYzine, nicotine, nystatin, polyethylene glycol, " "polyethylene glycol-propylene glycol PF, senna-docusate    Medication adherence issues: MS Med Adherence Y/N: Yes, Hospitalization  Medication side effects: MEDICATION SIDE EFFECTS: no side effects reported  Benefit: Yes / No: Yes       ROS   A comprehensive review of systems was negative.       MENTAL STATUS EXAM   Vitals: /71 (BP Location: Right arm)   Pulse 87   Temp 96.9  F (36.1  C) (Temporal)   Resp 16   Ht 1.778 m (5' 10\")   Wt 77.7 kg (171 lb 4.8 oz)   SpO2 97%   BMI 24.58 kg/m       Same as last visit  Appearance:  No apparent distress  Mood: \"Feeling tired\"  Affect: Calm  Suicidal Ideation: Denies  Homicidal Ideation: Denies  Thought process: Disorganized and concrete  Thought content: Remains confused and delusional and hallucinating at times.    Fund of Knowledge: Below average  Attention/Concentration: Poor  Language ability: Significantly impaired  Memory: Global memory impairment  Insight:  Poor.  Judgement: Poor  Orientation: Only to person  Psychomotor Behavior: slowed    Muscle Strength and Tone: MuscleStrength: Normal and Atrophy  Gait and Station: Not evaluated       LABS   personally reviewed.     No results found for: PHENYTOIN, PHENOBARB, VALPROATE, CBMZ       DIAGNOSIS   Principal Problem:    Major neurocognitive disorder, due to multiple etiologies, with behavioral disturbance, severe (H)    Active Problem List:  Patient Active Problem List   Diagnosis     TBI with aggressive behavior     HTN (hypertension)     COPD (chronic obstructive pulmonary disease) (H)     Dementia with behavioral disturbance (H)     Chronic schizophrenia (H)     Smoker     Agitation     Behavior disturbance     Psychosis (H)     Major neurocognitive disorder, due to multiple etiologies, with behavioral disturbance, severe (H)     Paranoid schizophrenia, chronic condition with acute exacerbation (H)     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 Ongoing education given regarding diagnostic and treatment options with risks, benefits and alternatives and adequate verbalization of understanding.  2.  Medications       BuSpar 30 mg 2 times daily       Cogentin 0.5 mg 2 times a day       Clozaril 250 mg at bedtime       Aricept 10 mg at bedtime       Gabapentin 100 mg 3 times a day       Namenda 10 mg 2 times a day       Remeron 15 mg at bedtime       melatonin 5 mg at bedtime  3.  Medical team following the patient   4.   coordinating a safe discharge plan with the patient.  5.  Labs have been ordered, liver functions remained stable consistent with the hep C diagnosis.      Risk Assessment: [facility] RISK ASSESSMENT: Patient able to contract for safety    Coordination of Care:   Treatment Plan reviewed and physician signed, Care discussed with Care/Treatment Team Members, Chart reviewed and Patient seen      Re-Certification I certify that the inpatient psychiatric facility services furnished since the previous certification were, and continue to be, medically necessary for, either, treatment which could reasonably be expected to improve the patient s condition or diagnostic study and that the hospital records indicate that the services furnished were, either, intensive treatment services, admission and related services necessary for diagnostic study, or equivalent services.     I certify that the patient continues to need, on a daily basis, active treatment furnished directly by or requiring the supervision of inpatient psychiatric facility personnel.   I estimate 14 days of hospitalization is necessary for proper treatment of the patient. My plans for post-hospital care for this patient are  TBD     Ginger Dubon MD    -     08/26/2022  -     2:43 PM    Total time 15 minutes with > 50%spent on coordination of cares and  psycho-education.    This note was created with help of Dragon dictation system. Grammatical / typing errors are not intentional.    Gingre Dubon MD

## 2022-08-26 NOTE — PLAN OF CARE
Problem: Sleep Disturbance (Psychotic Signs/Symptoms)  Goal: Improved Sleep (Psychotic Signs/Symptoms)  Outcome: Ongoing, Not Progressing   Goal Outcome Evaluation:    Patient slept a total of 10.5 hours and did not get up to void even once during the night. No behavioral issues noted the whole shift.

## 2022-08-26 NOTE — PLAN OF CARE
"Goal Outcome Evaluation:    Plan of Care Reviewed With: patient        Pt declined to come to [facility] for dinner, stating \"I don't want to walk.\" Dinner tray brought to room and pt appeared to eat approx 50%. Affect blunted. Calm. Declines to participate in unit activities or therapeutic groups. Speech limited to one or two word responses. Poverty of thought. Appears to be attending to internal stimuli at times. Med-compliant. Continue with current treatment plan and recommendations. Continue to monitor and reassess symptoms. Monitor response to medications. Monitor progress towards treatment goals. Encourage groups and participation.          "

## 2022-08-26 NOTE — PLAN OF CARE
08/25/22 1413   Individualization/Patient Specific Goals   Patient Personal Strengths resilient   Patient Vulnerabilities lacks insight into illness   Anxieties, Fears or Concerns Patient lacks insight into his illness. He is at baseline and has delusions and hallucinations.   Individualized Care Needs Patient is on a commitment/pittman and guardianship case is pending.   Patient-Specific Goals (Include Timeframe) Continue with plan for stabilization 10-15 days   Interprofessional Rounds   Summary Patient continues to be stable at baseline. Patient has delusions and hallucinations. Patient is waiting for guardianship case to be finalized.   Participants nursing;psychiatrist;CTC;OT   Team Discussion   Participants Dr. Root, OT, CTC, RN   Progress Making progress, patient is at baseline   Anticipated length of stay 10-14 days   Continued Stay Criteria/Rationale Patient can not discharge safely. He needs assistance with all ADLs. Team is waiting on final guardianship ruling. Patient will discharge to a group home/assisted living.   Medical/Physical Please see H and P.   Plan Continue with plan: medication management, psychiatric evaluations, medical evaluations as needed, nursing assessments, milieu therapy, and CTC case management. Order for guardianship has been received. Awaiting letters of guardianship from the court.   Anticipated Discharge Disposition assisted living      PRECAUTIONS AND SAFETY           Behavioral Orders   Procedures     Code 1 - Restrict to Unit     Code 2       CT scan only     Code 3       Patient can go out of the unit with staff supervision. He needs to be taken out by him self with 2 staff and security present.     Fall precautions     Routine Programming       As clinically indicated     Status 15       Every 15 minutes.     Status Individual Observation       Patient SIO status reviewed with team/RN.  Please also refer to RN/team documentation for add'l detail.     -SIO staff to  monitor following which have contributed to patient being on SIO:  Unresponsive episode with increased confusion  -Possible interventions SIO staff could use to support patient's treatment progress:  Monitor patient for further unresponsive episodes, SBA with cares and ambulation  -When following observed, team will review discontinuation of SIO:  Improved medical condition       Order Specific Question:   CONTINUOUS 24 hours / day       Answer:   Other       Order Specific Question:   Specify distance       Answer:   10ft       Order Specific Question:   Indications for SIO       Answer:   Self-injury risk     Status Individual Observation       Patient SIO status reviewed with team/RN.  Please also refer to RN/team documentation for add'l detail.     -SIO staff to monitor following which have contributed to patient being on SIO:  Unresponsive episode with increased confusion  -Possible interventions SIO staff could use to support patient's treatment progress:  Monitor patient for further unresponsive episodes, SBA with cares and ambulation  -When following observed, team will review discontinuation of SIO:  Improved medical condition       Order Specific Question:   CONTINUOUS 24 hours / day       Answer:   Other       Order Specific Question:   Specify distance       Answer:   10ft       Order Specific Question:   Indications for SIO       Answer:   Self-injury risk         Safety  Safety WDL: WDL  Patient Location: patient room, own  Observed Behavior: calm  Observed Behavior (Comment): laying in bed  Safety Measures: safety plan reviewed  Diversional Activity: other (see comments) (pt declined to come out of the roonm)  Suicidality: Status 15  Seizure precautions: clutter free environment  Assault: status 15  Elopement Assessment: Distress about legal situation (holds for mental health or criminal)  Elopement Interventions: status 15  Sexual: status 15, private room

## 2022-08-26 NOTE — PLAN OF CARE
"Goal Outcome Evaluation:    Plan of Care Reviewed With: patient                 Problem: Behavioral Health Plan of Care  Goal: Plan of Care Review  Recent Flowsheet Documentation  Taken 8/26/2022 1020 by Wen Broderick RN  Plan of Care Reviewed With: patientNursing Assessment    Psychosis (H) [F29]    Admit Date: 1/26/2022    Length of Stay: 212    Patient evaluation continues. Assessed mood,anxiety,thoughts and behavior. Patient is progressing towards goals. Patient is encouraged to participate in groups and assisted to develop healthy coping skills.  Patient admits to auditory  hallucinations. Pt talks to his sister./71 (BP Location: Right arm)   Pulse 87   Temp 96.9  F (36.1  C) (Temporal)   Resp 16   Ht 1.778 m (5' 10\")   Wt 77.7 kg (171 lb 4.8 oz)   SpO2 97%   BMI 24.58 kg/m      Mood: ok    Patient reports depression none and reports anxiety no    Affect:flat blunted but brightens upon approach    Sleep: good    Appetite: good    SI: denies    HI: denies    SIB: denies      Medication Compliance yes    Group participation:no    ADL's: refuses, needs encouragement    Fall risk interventions: proper foot wear, orthostatic B/p, standby assist    Rinku Score Interventions:none    Discharge planning in process    Refer to daily team meeting notes for individualized plan of care. Nursing will continue to assess.    *Scale is 1-10 and 10 is the worst.         Patient Agreement with Plan of Care: refuses to participate     "

## 2022-08-26 NOTE — PLAN OF CARE
Assessment/Intervention/Current Symptoms and Care Coordination:     -Reviewed pt s chart  -Rounded with team and reviewed his status. Awaiting   As of yesterday:  -Per Celia at [facility], they are waiting to verify guardianship after which they can do [place], per email of yesterday.  - Contacted the  and she said that the court had requested some filings to occur before it can issue the letters for guardianship (and a guardian cannot take actions until the letters have been issued). Currently, the filings have been sent and now waiting for the letters to be issued.      - Completed senior linkage form received and sent back to [facility] for coordination (Joi Roque).     As of yesterday, the status was  Received a call from [place] [facility] staff, following a referral that was made by writer. Staff (Carlo - [phone]) reported that they are unable to accept the referral because:  1. He has an MA and has been since 2016  2. Has a managed care through [facility].  Carlo said she is declining the referral and will communicate to [facility] to take service the patient      Discharge Plan or Goal:     - Received information this morning that the   has ruled on this patient's guardianship: Final steps towards obtaining a letter of guardianship is in motion.  -Awaiting to receive the guardianship order and letter of guardianship  - Patient continues to stabilize at baseline.      Barriers to Discharge:  - Funding  - Receipt of guardianship order is expected.    - Patient is stable at his baseline. He can not care for himself, needs assist with all ADLs. Patient needs safe discharge plan.     Referral Status:  - [facility]  [facility]  - [place] ([place] will complete the assessment since the  has ruled on guardianship      Legal Status:  Court Hold     Contact:  Joi Roque  [facility] Service  Coordinator  [facility]  Phone: [phone]    Fax: [phone]  Monday -Thursday 6:30 AM-4:00 PM,   Friday 6:30-10:30 AM

## 2022-08-27 PROCEDURE — 250N000013 HC RX MED GY IP 250 OP 250 PS 637: Performed by: PSYCHIATRY & NEUROLOGY

## 2022-08-27 PROCEDURE — 124N000003 HC R&B MH SENIOR/ADOLESCENT

## 2022-08-27 RX ADMIN — CLOZAPINE 250 MG: 100 TABLET ORAL at 20:52

## 2022-08-27 RX ADMIN — MEMANTINE 10 MG: 10 TABLET ORAL at 09:04

## 2022-08-27 RX ADMIN — LEVOTHYROXINE SODIUM 88 MCG: 0.09 TABLET ORAL at 09:04

## 2022-08-27 RX ADMIN — GABAPENTIN 100 MG: 100 CAPSULE ORAL at 09:04

## 2022-08-27 RX ADMIN — Medication 5 MG: at 20:52

## 2022-08-27 RX ADMIN — MEGESTROL ACETATE 20 MG: 20 TABLET ORAL at 09:04

## 2022-08-27 RX ADMIN — MIRTAZAPINE 15 MG: 15 TABLET, FILM COATED ORAL at 20:52

## 2022-08-27 RX ADMIN — MEMANTINE 10 MG: 10 TABLET ORAL at 20:52

## 2022-08-27 RX ADMIN — SALMETEROL XINAFOATE 1 PUFF: 50 POWDER, METERED ORAL; RESPIRATORY (INHALATION) at 09:05

## 2022-08-27 RX ADMIN — BUSPIRONE HYDROCHLORIDE 30 MG: 15 TABLET ORAL at 09:04

## 2022-08-27 RX ADMIN — BENZTROPINE MESYLATE 0.5 MG: 0.5 TABLET ORAL at 20:52

## 2022-08-27 RX ADMIN — BENZTROPINE MESYLATE 0.5 MG: 0.5 TABLET ORAL at 09:04

## 2022-08-27 RX ADMIN — BUSPIRONE HYDROCHLORIDE 30 MG: 15 TABLET ORAL at 20:52

## 2022-08-27 RX ADMIN — ASPIRIN 81 MG: 81 TABLET, COATED ORAL at 09:04

## 2022-08-27 RX ADMIN — SALMETEROL XINAFOATE 1 PUFF: 50 POWDER, METERED ORAL; RESPIRATORY (INHALATION) at 20:53

## 2022-08-27 RX ADMIN — GABAPENTIN 100 MG: 100 CAPSULE ORAL at 17:48

## 2022-08-27 RX ADMIN — GABAPENTIN 100 MG: 100 CAPSULE ORAL at 12:51

## 2022-08-27 RX ADMIN — DONEPEZIL HYDROCHLORIDE 10 MG: 10 TABLET ORAL at 20:52

## 2022-08-27 ASSESSMENT — ACTIVITIES OF DAILY LIVING (ADL)
ADLS_ACUITY_SCORE: 76
ADLS_ACUITY_SCORE: 76
ADLS_ACUITY_SCORE: 73
ADLS_ACUITY_SCORE: 76

## 2022-08-27 NOTE — PLAN OF CARE
Goal Outcome Evaluation:    Plan of Care Reviewed With: patient     Flat affect, mood is calm, pt in room most of shift, out for meals. Pt givens minimal response to questions asked. Pt encouraged to toilet every 2 hours, pt declines. Pt appears disheveled and smells of urine, declines to shower and/or change his brief at this time, plan to re-approach.     Plan:  Continue to encourage fluids and increased activity  Continue to encourage group participation  Continue to encourage choices and independence  Continue to encourage non-pharmacological coping skills

## 2022-08-27 NOTE — PLAN OF CARE
"Patient spent evening shift in his room. He refused to come out for dinner so ate in his room. He ate 45% of his dinner. His mood is calm with a flat and blunted affect. Patient denies pain. Grooming is neglected, he was offered a shower but declined. He was asked to change into clean scrubs but declined stating 'i am tired.\" has long finger nails, writer offered to cut them short and he responded \"tomorrow.\" Patient came out after dinner and sat in the [facility] area. He ate snack (popcorn) , Patient is medication compliant. Staff will continue to monitor.   "

## 2022-08-27 NOTE — PLAN OF CARE
Problem: Sleep Disturbance (Psychotic Signs/Symptoms)  Goal: Improved Sleep (Psychotic Signs/Symptoms)  Outcome: Ongoing, Progressing   Goal Outcome Evaluation:    Patient slept a total of 9.75  hours. No complaints noted the whole night.

## 2022-08-27 NOTE — PLAN OF CARE
"Goal Outcome Evaluation:    Plan of Care Reviewed With: patient     Flat affect, mood is calm, pt appears responding, pt will turn his head and talk, when asked to repeat himself pt responded \"I am not talking to you\". Pt encouraged to toilet every 2 hours, at times pt declines. Pt declined a shower, pt agreeable to changing cloths, changing bedding and jeanie cares. Pt in room most of shift, out for meal. SBA    BP 94/65 P 110, pt denies feeling lightheaded/dizzy, fluids encouraged, plan to recheck VS. /79 P 94    Plan:  Continue to encourage fluids and increased activity  Continue to encourage group participation  Continue to encourage choices and independence  Continue to encourage non-pharmacological coping skills      "

## 2022-08-28 PROCEDURE — 124N000003 HC R&B MH SENIOR/ADOLESCENT

## 2022-08-28 PROCEDURE — 250N000013 HC RX MED GY IP 250 OP 250 PS 637: Performed by: PSYCHIATRY & NEUROLOGY

## 2022-08-28 RX ADMIN — DONEPEZIL HYDROCHLORIDE 10 MG: 10 TABLET ORAL at 19:36

## 2022-08-28 RX ADMIN — CLOZAPINE 250 MG: 100 TABLET ORAL at 19:35

## 2022-08-28 RX ADMIN — GABAPENTIN 100 MG: 100 CAPSULE ORAL at 12:55

## 2022-08-28 RX ADMIN — BENZTROPINE MESYLATE 0.5 MG: 0.5 TABLET ORAL at 08:15

## 2022-08-28 RX ADMIN — MEMANTINE 10 MG: 10 TABLET ORAL at 08:15

## 2022-08-28 RX ADMIN — SALMETEROL XINAFOATE 1 PUFF: 50 POWDER, METERED ORAL; RESPIRATORY (INHALATION) at 08:15

## 2022-08-28 RX ADMIN — GABAPENTIN 100 MG: 100 CAPSULE ORAL at 17:20

## 2022-08-28 RX ADMIN — BUSPIRONE HYDROCHLORIDE 30 MG: 15 TABLET ORAL at 08:15

## 2022-08-28 RX ADMIN — BUSPIRONE HYDROCHLORIDE 30 MG: 15 TABLET ORAL at 19:35

## 2022-08-28 RX ADMIN — GABAPENTIN 100 MG: 100 CAPSULE ORAL at 08:15

## 2022-08-28 RX ADMIN — LEVOTHYROXINE SODIUM 88 MCG: 0.09 TABLET ORAL at 08:15

## 2022-08-28 RX ADMIN — MEGESTROL ACETATE 20 MG: 20 TABLET ORAL at 08:15

## 2022-08-28 RX ADMIN — MEMANTINE 10 MG: 10 TABLET ORAL at 19:36

## 2022-08-28 RX ADMIN — Medication 5 MG: at 19:36

## 2022-08-28 RX ADMIN — SALMETEROL XINAFOATE 1 PUFF: 50 POWDER, METERED ORAL; RESPIRATORY (INHALATION) at 19:38

## 2022-08-28 RX ADMIN — MIRTAZAPINE 15 MG: 15 TABLET, FILM COATED ORAL at 19:36

## 2022-08-28 RX ADMIN — ASPIRIN 81 MG: 81 TABLET, COATED ORAL at 08:15

## 2022-08-28 RX ADMIN — BENZTROPINE MESYLATE 0.5 MG: 0.5 TABLET ORAL at 19:36

## 2022-08-28 ASSESSMENT — ACTIVITIES OF DAILY LIVING (ADL)
ADLS_ACUITY_SCORE: 73

## 2022-08-28 NOTE — PLAN OF CARE
Goal Outcome Evaluation:    Plan of Care Reviewed With: patient     Flat/blunt affect, mood is calm, pt oriented to self only, denies MH s/s, pt appears responding. Pt has body odor, pt declined to shower or have assistance with ADLs this AM, pt agreeable to coming to [facility] for breakfast and lunch only. Pt encouraged to toilet every 2 hours, pt declines at times.      Breakfast: 100%  Lunch: 75%    I: 580 mL    Cont of clear franko urine x 2 this shift.     Plan:  Continue to encourage fluids and increased activity  Continue to encourage group participation  Continue to encourage choices and independence  Continue to encourage non-pharmacological coping skills

## 2022-08-28 NOTE — PLAN OF CARE
Problem: Sleep Disturbance (Psychotic Signs/Symptoms)  Goal: Improved Sleep (Psychotic Signs/Symptoms)  Outcome: Ongoing, Progressing   Goal Outcome Evaluation:      Patient slept a total of 9.5 hours. No complaints noted the whole shift.

## 2022-08-28 NOTE — PLAN OF CARE
Patient spent majority of the evening in the [facility] area watching [place]. He ate dinner in his room. Patient ate about 30% of his food. His mood is calm. Affect is flat and blunted. Patient appears to be responding to internal stimuli, was observed talking to himself. He denies all MH symptoms. He is medication compliant. Grooming is poor. Declined assistance with ADls.

## 2022-08-29 LAB — SARS-COV-2 RNA RESP QL NAA+PROBE: NEGATIVE

## 2022-08-29 PROCEDURE — U0003 INFECTIOUS AGENT DETECTION BY NUCLEIC ACID (DNA OR RNA); SEVERE ACUTE RESPIRATORY SYNDROME CORONAVIRUS 2 (SARS-COV-2) (CORONAVIRUS DISEASE [COVID-19]), AMPLIFIED PROBE TECHNIQUE, MAKING USE OF HIGH THROUGHPUT TECHNOLOGIES AS DESCRIBED BY CMS-2020-01-R: HCPCS | Performed by: PSYCHIATRY & NEUROLOGY

## 2022-08-29 PROCEDURE — 99231 SBSQ HOSP IP/OBS SF/LOW 25: CPT | Performed by: PSYCHIATRY & NEUROLOGY

## 2022-08-29 PROCEDURE — 124N000003 HC R&B MH SENIOR/ADOLESCENT

## 2022-08-29 PROCEDURE — 250N000013 HC RX MED GY IP 250 OP 250 PS 637: Performed by: PSYCHIATRY & NEUROLOGY

## 2022-08-29 RX ADMIN — MEMANTINE 10 MG: 10 TABLET ORAL at 09:06

## 2022-08-29 RX ADMIN — BUSPIRONE HYDROCHLORIDE 30 MG: 15 TABLET ORAL at 20:00

## 2022-08-29 RX ADMIN — MIRTAZAPINE 15 MG: 15 TABLET, FILM COATED ORAL at 20:00

## 2022-08-29 RX ADMIN — ASPIRIN 81 MG: 81 TABLET, COATED ORAL at 09:06

## 2022-08-29 RX ADMIN — MEMANTINE 10 MG: 10 TABLET ORAL at 20:00

## 2022-08-29 RX ADMIN — Medication 5 MG: at 20:00

## 2022-08-29 RX ADMIN — BENZTROPINE MESYLATE 0.5 MG: 0.5 TABLET ORAL at 20:00

## 2022-08-29 RX ADMIN — GABAPENTIN 100 MG: 100 CAPSULE ORAL at 12:13

## 2022-08-29 RX ADMIN — BUSPIRONE HYDROCHLORIDE 30 MG: 15 TABLET ORAL at 09:06

## 2022-08-29 RX ADMIN — GABAPENTIN 100 MG: 100 CAPSULE ORAL at 09:06

## 2022-08-29 RX ADMIN — CLOZAPINE 250 MG: 100 TABLET ORAL at 19:59

## 2022-08-29 RX ADMIN — SALMETEROL XINAFOATE 1 PUFF: 50 POWDER, METERED ORAL; RESPIRATORY (INHALATION) at 20:00

## 2022-08-29 RX ADMIN — LEVOTHYROXINE SODIUM 88 MCG: 0.09 TABLET ORAL at 09:06

## 2022-08-29 RX ADMIN — DONEPEZIL HYDROCHLORIDE 10 MG: 10 TABLET ORAL at 20:00

## 2022-08-29 RX ADMIN — SALMETEROL XINAFOATE 1 PUFF: 50 POWDER, METERED ORAL; RESPIRATORY (INHALATION) at 09:06

## 2022-08-29 RX ADMIN — GABAPENTIN 100 MG: 100 CAPSULE ORAL at 17:16

## 2022-08-29 RX ADMIN — MEGESTROL ACETATE 20 MG: 20 TABLET ORAL at 09:06

## 2022-08-29 RX ADMIN — BENZTROPINE MESYLATE 0.5 MG: 0.5 TABLET ORAL at 09:06

## 2022-08-29 ASSESSMENT — ACTIVITIES OF DAILY LIVING (ADL)
ADLS_ACUITY_SCORE: 73

## 2022-08-29 NOTE — PROGRESS NOTES
PSYCHIATRY  PROGRESS NOTE     DATE OF SERVICE   08/29/2022       CHIEF COMPLAINT   Patient was admitted due to inability to safely care for himself and psychosis.       SUBJECTIVE   Nursing reports:   Patient is isolative to self and withdrawn. Out of room for meals. Patient is calm, minimal verbal engagement with writer during nursing assessment.  Blunted and flat affect; visibly sad.      Patient is medication compliant.     Abnormal blood pressure 124/93, other vital signs stable, denies pain.     Good appetite. 75% breakfast and lunch.     Staff will continue to monitor patient closely.      Patient has been discussed in detail with the  during team meeting.   has continued to follow-up on the guardianship documents.  Patient still has not been assigned a guardian.     OBJECTIVE   Patient was seen and evaluated at bedside by himself, this was a face-to-face evaluation.  Patient continues to respond to internal stimuli. He informed me that he is feeling tired. Denies any other issued. Patient denies any SI and has been able to contract for safety.       MEDICATIONS   Medications:  Scheduled Meds:    aspirin  81 mg Oral Daily     benztropine  0.5 mg Oral BID     busPIRone HCl  30 mg Oral BID     cloZAPine  250 mg Oral At Bedtime     donepezil  10 mg Oral At Bedtime     gabapentin  100 mg Oral TID w/meals     levothyroxine  88 mcg Oral Daily     megestrol  20 mg Oral Daily     melatonin  5 mg Oral At Bedtime     memantine  10 mg Oral BID     mirtazapine  15 mg Oral At Bedtime     salmeterol  1 puff Inhalation BID     Continuous Infusions:  PRN Meds:.albuterol, alum & mag hydroxide-simethicone, benzocaine-menthol, hydrOXYzine, nicotine, nystatin, polyethylene glycol, polyethylene glycol-propylene glycol PF, senna-docusate    Medication adherence issues: MS Med Adherence Y/N: Yes, Hospitalization  Medication side effects: MEDICATION SIDE EFFECTS: no side effects reported  Benefit: Yes / No:  "Yes       ROS   A comprehensive review of systems was negative.       MENTAL STATUS EXAM   Vitals: /63   Pulse 80   Temp 97.6  F (36.4  C)   Resp 16   Ht 1.778 m (5' 10\")   Wt 78.5 kg (173 lb 1 oz)   SpO2 98%   BMI 24.83 kg/m       Same as last visit  Appearance:  No apparent distress  Mood: \"Feeling tired\"  Affect: Calm  Suicidal Ideation: Denies  Homicidal Ideation: Denies  Thought process: Disorganized and concrete  Thought content: Remains confused and delusional and hallucinating at times.    Fund of Knowledge: Below average  Attention/Concentration: Poor  Language ability: Significantly impaired  Memory: Global memory impairment  Insight:  Poor.  Judgement: Poor  Orientation: Only to person  Psychomotor Behavior: slowed    Muscle Strength and Tone: MuscleStrength: Normal and Atrophy  Gait and Station: Not evaluated       LABS   personally reviewed.     No results found for: PHENYTOIN, PHENOBARB, VALPROATE, CBMZ       DIAGNOSIS   Principal Problem:    Major neurocognitive disorder, due to multiple etiologies, with behavioral disturbance, severe (H)    Active Problem List:  Patient Active Problem List   Diagnosis     TBI with aggressive behavior     HTN (hypertension)     COPD (chronic obstructive pulmonary disease) (H)     Dementia with behavioral disturbance (H)     Chronic schizophrenia (H)     Smoker     Agitation     Behavior disturbance     Psychosis (H)     Major neurocognitive disorder, due to multiple etiologies, with behavioral disturbance, severe (H)     Paranoid schizophrenia, chronic condition with acute exacerbation (H)     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 Ongoing education given regarding diagnostic and treatment options with risks, benefits and alternatives and adequate verbalization of " understanding.  2.  Medications       BuSpar 30 mg 2 times daily       Cogentin 0.5 mg 2 times a day       Clozaril 250 mg at bedtime       Aricept 10 mg at bedtime       Gabapentin 100 mg 3 times a day       Namenda 10 mg 2 times a day       Remeron 15 mg at bedtime       melatonin 5 mg at bedtime  3.  Medical team following the patient   4.   coordinating a safe discharge plan with the patient.  5.  Labs have been ordered, liver functions remained stable consistent with the hep C diagnosis.      Risk Assessment: [facility] RISK ASSESSMENT: Patient able to contract for safety    Coordination of Care:   Treatment Plan reviewed and physician signed, Care discussed with Care/Treatment Team Members, Chart reviewed and Patient seen      Re-Certification I certify that the inpatient psychiatric facility services furnished since the previous certification were, and continue to be, medically necessary for, either, treatment which could reasonably be expected to improve the patient s condition or diagnostic study and that the hospital records indicate that the services furnished were, either, intensive treatment services, admission and related services necessary for diagnostic study, or equivalent services.     I certify that the patient continues to need, on a daily basis, active treatment furnished directly by or requiring the supervision of inpatient psychiatric facility personnel.   I estimate 14 days of hospitalization is necessary for proper treatment of the patient. My plans for post-hospital care for this patient are  TBD     Ginger Dubon MD    -     08/29/2022  -     6:53 PM    Total time 15 minutes with > 50%spent on coordination of cares and psycho-education.    This note was created with help of Dragon dictation system. Grammatical / typing errors are not intentional.    Ginger Dubon MD

## 2022-08-29 NOTE — PLAN OF CARE
Patient is isolative to self and withdrawn. Out of room for meals. Patient is calm, minimal verbal engagement with writer during nursing assessment.  Blunted and flat affect; visibly sad.     Patient is medication compliant.    Abnormal blood pressure 124/93, other vital signs stable, denies pain.    Good appetite. 75% breakfast and lunch.    Staff will continue to monitor patient closely.                  Problem: Behavior Regulation Impairment (Psychotic Signs/Symptoms)  Goal: Improved Behavioral Control (Psychotic Signs/Symptoms)  Outcome: Ongoing, Progressing     Problem: Depression  Goal: Improved Mood  Outcome: Ongoing, Progressing   Goal Outcome Evaluation:    Plan of Care Reviewed With: patient

## 2022-08-29 NOTE — PLAN OF CARE
Assessment/Intervention/Current Symptoms and Care Coordination:     -Reviewed pt s chart  -Rounded with team and reviewed his status.   -Per Celia at [facility], they are waiting to verify guardianship after which they can do [place] assessment  - Awaiting letters of guardianship   -Spoke with [facility]' care coordinator and updated her on status of group home referrals     As of last week, the status was  Received a call from [place] [facility] staff, following a referral that was made by writer. Staff (Carlo - [phone]) reported that they are unable to accept the referral because:  1. He has an MA and has been since 2016  2. Has a managed care through [facility].  Carlo said she is declining the referral and will communicate to [facility] to take service the patient      Discharge Plan or Goal:     - Received information this morning that the   has ruled on this patient's guardianship: Final steps towards obtaining a letter of guardianship is in motion.  -Awaiting to receive the guardianship order and letter of guardianship  - Patient continues to stabilize at baseline.      Barriers to Discharge:  - Funding  - Receipt of guardianship order is expected.    - Patient is stable at his baseline. He can not care for himself, needs assist with all ADLs. Patient needs safe discharge plan.     Referral Status:  - [facility]  - [place] ([place] will complete the assessment since the  has ruled on guardianship      Legal Status:  Court Hold     Contact:  Joi Roque  [place]   [facility]  Phone: [phone]    Fax: [phone]  Monday -Thursday 6:30 AM-4:00 PM,   Friday 6:30-10:30 AM

## 2022-08-29 NOTE — PROGRESS NOTES
Pt slept a total of about 9 hours. No concerns reported or noted this shift. Will continue to monitor.

## 2022-08-30 PROCEDURE — 250N000013 HC RX MED GY IP 250 OP 250 PS 637: Performed by: PSYCHIATRY & NEUROLOGY

## 2022-08-30 PROCEDURE — 124N000003 HC R&B MH SENIOR/ADOLESCENT

## 2022-08-30 RX ADMIN — SALMETEROL XINAFOATE 1 PUFF: 50 POWDER, METERED ORAL; RESPIRATORY (INHALATION) at 19:13

## 2022-08-30 RX ADMIN — BENZTROPINE MESYLATE 0.5 MG: 0.5 TABLET ORAL at 08:30

## 2022-08-30 RX ADMIN — GABAPENTIN 100 MG: 100 CAPSULE ORAL at 17:38

## 2022-08-30 RX ADMIN — DONEPEZIL HYDROCHLORIDE 10 MG: 10 TABLET ORAL at 19:13

## 2022-08-30 RX ADMIN — MEGESTROL ACETATE 20 MG: 20 TABLET ORAL at 08:30

## 2022-08-30 RX ADMIN — CLOZAPINE 250 MG: 100 TABLET ORAL at 19:13

## 2022-08-30 RX ADMIN — MEMANTINE 10 MG: 10 TABLET ORAL at 08:30

## 2022-08-30 RX ADMIN — Medication 5 MG: at 19:13

## 2022-08-30 RX ADMIN — MEMANTINE 10 MG: 10 TABLET ORAL at 19:13

## 2022-08-30 RX ADMIN — GABAPENTIN 100 MG: 100 CAPSULE ORAL at 08:30

## 2022-08-30 RX ADMIN — MIRTAZAPINE 15 MG: 15 TABLET, FILM COATED ORAL at 19:13

## 2022-08-30 RX ADMIN — LEVOTHYROXINE SODIUM 88 MCG: 0.09 TABLET ORAL at 08:30

## 2022-08-30 RX ADMIN — SALMETEROL XINAFOATE 1 PUFF: 50 POWDER, METERED ORAL; RESPIRATORY (INHALATION) at 08:30

## 2022-08-30 RX ADMIN — BENZTROPINE MESYLATE 0.5 MG: 0.5 TABLET ORAL at 19:13

## 2022-08-30 RX ADMIN — GABAPENTIN 100 MG: 100 CAPSULE ORAL at 12:49

## 2022-08-30 RX ADMIN — BUSPIRONE HYDROCHLORIDE 30 MG: 15 TABLET ORAL at 19:13

## 2022-08-30 RX ADMIN — ASPIRIN 81 MG: 81 TABLET, COATED ORAL at 08:30

## 2022-08-30 RX ADMIN — BUSPIRONE HYDROCHLORIDE 30 MG: 15 TABLET ORAL at 08:30

## 2022-08-30 ASSESSMENT — ACTIVITIES OF DAILY LIVING (ADL)
ADLS_ACUITY_SCORE: 73

## 2022-08-30 NOTE — PLAN OF CARE
Problem: Behavior Regulation Impairment (Psychotic Signs/Symptoms)  Goal: Improved Behavioral Control (Psychotic Signs/Symptoms)  Outcome: Ongoing, Progressing         Slept well tonight for 10.5 hours  No behvioral issues encountered tonight  Independent with toileting.

## 2022-08-30 NOTE — PLAN OF CARE
Problem: Behavior Regulation Impairment (Psychotic Signs/Symptoms)  Goal: Improved Behavioral Control (Psychotic Signs/Symptoms)  Outcome: Ongoing, Not Progressing   Goal Outcome Evaluation:    Patient isolative and withdrawn. He was observed talking to himself seemingly responding to internal stimuli. However, when asked about it, patient denied. He denied SI/SIB/HI too. He laid down in his bed majority of the shift, napping. He is med compliant. No statements that he wants to leave the unit. His speech is pressured and rambling. He ate 75% of his dinner. He refused to go to group activities this shift. Although he is independent on adl's he remains unkempt. He refused to take a shower.

## 2022-08-30 NOTE — PROGRESS NOTES
"PSYCHIATRY  PROGRESS NOTE     DATE OF SERVICE      8/30/2022  CHIEF COMPLAINT   Patient was admitted due to inability to safely care for himself and psychosis.       SUBJECTIVE   Nursing reports:   Nursing staff patient continues to be at his baseline.      Patient has been discussed in detail with the  during team meeting.   has continued to follow-up on the guardianship documents.       OBJECTIVE   Patient was seen and evaluated at bedside.  He remains stable and no new behaviors have been reported.   is still awaiting on the letter for the guardianship.       MEDICATIONS   Medications:  Scheduled Meds:    aspirin  81 mg Oral Daily     benztropine  0.5 mg Oral BID     busPIRone HCl  30 mg Oral BID     cloZAPine  250 mg Oral At Bedtime     donepezil  10 mg Oral At Bedtime     gabapentin  100 mg Oral TID w/meals     levothyroxine  88 mcg Oral Daily     megestrol  20 mg Oral Daily     melatonin  5 mg Oral At Bedtime     memantine  10 mg Oral BID     mirtazapine  15 mg Oral At Bedtime     salmeterol  1 puff Inhalation BID     Continuous Infusions:  PRN Meds:.albuterol, alum & mag hydroxide-simethicone, benzocaine-menthol, hydrOXYzine, nicotine, nystatin, polyethylene glycol, polyethylene glycol-propylene glycol PF, senna-docusate    Medication adherence issues: MS Med Adherence Y/N: Yes, Hospitalization  Medication side effects: MEDICATION SIDE EFFECTS: no side effects reported  Benefit: Yes / No: Yes       ROS   A comprehensive review of systems was negative.       MENTAL STATUS EXAM   Vitals: BP 90/64   Pulse 70   Temp 97.3  F (36.3  C) (Temporal)   Resp 16   Ht 1.778 m (5' 10\")   Wt 79 kg (174 lb 2.6 oz)   SpO2 97%   BMI 24.99 kg/m       Same as last visit  Appearance:  No apparent distress  Mood: \"Feeling tired\"  Affect: Calm  Suicidal Ideation: Denies  Homicidal Ideation: Denies  Thought process: Disorganized and concrete  Thought content: Remains confused and " delusional and hallucinating at times.    Fund of Knowledge: Below average  Attention/Concentration: Poor  Language ability: Significantly impaired  Memory: Global memory impairment  Insight:  Poor.  Judgement: Poor  Orientation: Only to person  Psychomotor Behavior: slowed    Muscle Strength and Tone: MuscleStrength: Normal and Atrophy  Gait and Station: Not evaluated       LABS   personally reviewed.     No results found for: PHENYTOIN, PHENOBARB, VALPROATE, CBMZ       DIAGNOSIS   Principal Problem:    Major neurocognitive disorder, due to multiple etiologies, with behavioral disturbance, severe (H)    Active Problem List:  Patient Active Problem List   Diagnosis     TBI with aggressive behavior     HTN (hypertension)     COPD (chronic obstructive pulmonary disease) (H)     Dementia with behavioral disturbance (H)     Chronic schizophrenia (H)     Smoker     Agitation     Behavior disturbance     Psychosis (H)     Major neurocognitive disorder, due to multiple etiologies, with behavioral disturbance, severe (H)     Paranoid schizophrenia, chronic condition with acute exacerbation (H)     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 Ongoing education given regarding diagnostic and treatment options with risks, benefits and alternatives and adequate verbalization of understanding.  2.  Medications       BuSpar 30 mg 2 times daily       Cogentin 0.5 mg 2 times a day       Clozaril 250 mg at bedtime       Aricept 10 mg at bedtime       Gabapentin 100 mg 3 times a day       Namenda 10 mg 2 times a day       Remeron 15 mg at bedtime       melatonin 5 mg at bedtime  3.  Medical team following the patient   4.   coordinating a safe discharge plan with the patient.  5.  Labs have been ordered, liver functions remained stable consistent with  the hep C diagnosis.      Risk Assessment: [facility] RISK ASSESSMENT: Patient able to contract for safety    Coordination of Care:   Treatment Plan reviewed and physician signed, Care discussed with Care/Treatment Team Members, Chart reviewed and Patient seen      Re-Certification I certify that the inpatient psychiatric facility services furnished since the previous certification were, and continue to be, medically necessary for, either, treatment which could reasonably be expected to improve the patient s condition or diagnostic study and that the hospital records indicate that the services furnished were, either, intensive treatment services, admission and related services necessary for diagnostic study, or equivalent services.     I certify that the patient continues to need, on a daily basis, active treatment furnished directly by or requiring the supervision of inpatient psychiatric facility personnel.   I estimate 14 days of hospitalization is necessary for proper treatment of the patient. My plans for post-hospital care for this patient are  TBD     Ginger Dubon MD    -     8/30/2022-     3:25 PM    Total time 15 minutes with > 50%spent on coordination of cares and psycho-education.    This note was created with help of Dragon dictation system. Grammatical / typing errors are not intentional.    Ginger Dubon MD

## 2022-08-30 NOTE — PLAN OF CARE
Assessment/Intervention/Current Symptoms and Care Coordination:     -Reviewed pt s chart  -New referral sent to [facility]  C/O Jasmin Childs  Phone: [phone]  Fax: [phone]     As of last week, the status was  Received a call from [facility] staff, following a referral that was made by writer. Staff (Carlo - [phone]) reported that they are unable to accept the referral because:  1. He has an MA and has been since 2016  2. Has a managed care through [employer].  Carlo said she is declining the referral and will communicate to [facility] [employer] to take service the patient      Discharge Plan or Goal:     - Received information this morning that the   has ruled on this patient's guardianship: Final steps towards obtaining a letter of guardianship is in motion.  -Awaiting to receive the guardianship order and letter of guardianship  - Patient continues to stabilize at baseline.      Barriers to Discharge:  - Funding  - Receipt of guardianship order is expected.    - Patient is stable at his baseline. He can not care for himself, needs assist with all ADLs. Patient needs safe discharge plan.     Referral Status:  - [facility]  [facility]  - [facility] (the [place] [facility] [place] team will complete the assessment since the  has ruled on guardianship      Legal Status:  Court Hold     Contact:  Joi Roque  [facility]   [facility]  Phone: [phone]    Fax: [phone]  Monday -Thursday 6:30 AM-4:00 PM,   Friday 6:30-10:30 AM

## 2022-08-30 NOTE — PLAN OF CARE
Problem: Behavioral Health Plan of Care  Goal: Plan of Care Review  Outcome: Ongoing, Progressing  Flowsheets (Taken 8/30/2022 1051)  Plan of Care Reviewed With: patient  Patient Agreement with Plan of Care: refuses to participate   Goal Outcome Evaluation:    Plan of Care Reviewed With: patient     Patient is calm, medication compliant. Disoriented to situation, time and place. Denies MH concerns, no observed hallucinations. Fair appetite ate 75% of breakfast and 25% of lunch. Isolative and withdrawn, not social with peers or attend groups.

## 2022-08-31 PROCEDURE — 250N000013 HC RX MED GY IP 250 OP 250 PS 637: Performed by: PSYCHIATRY & NEUROLOGY

## 2022-08-31 PROCEDURE — 124N000003 HC R&B MH SENIOR/ADOLESCENT

## 2022-08-31 PROCEDURE — 99231 SBSQ HOSP IP/OBS SF/LOW 25: CPT | Performed by: PSYCHIATRY & NEUROLOGY

## 2022-08-31 RX ADMIN — SALMETEROL XINAFOATE 1 PUFF: 50 POWDER, METERED ORAL; RESPIRATORY (INHALATION) at 19:25

## 2022-08-31 RX ADMIN — GABAPENTIN 100 MG: 100 CAPSULE ORAL at 08:13

## 2022-08-31 RX ADMIN — ASPIRIN 81 MG: 81 TABLET, COATED ORAL at 08:13

## 2022-08-31 RX ADMIN — BUSPIRONE HYDROCHLORIDE 30 MG: 15 TABLET ORAL at 19:25

## 2022-08-31 RX ADMIN — CLOZAPINE 250 MG: 100 TABLET ORAL at 19:25

## 2022-08-31 RX ADMIN — SALMETEROL XINAFOATE 1 PUFF: 50 POWDER, METERED ORAL; RESPIRATORY (INHALATION) at 08:14

## 2022-08-31 RX ADMIN — GABAPENTIN 100 MG: 100 CAPSULE ORAL at 17:22

## 2022-08-31 RX ADMIN — GABAPENTIN 100 MG: 100 CAPSULE ORAL at 12:49

## 2022-08-31 RX ADMIN — MIRTAZAPINE 15 MG: 15 TABLET, FILM COATED ORAL at 19:25

## 2022-08-31 RX ADMIN — Medication 5 MG: at 19:25

## 2022-08-31 RX ADMIN — MEMANTINE 10 MG: 10 TABLET ORAL at 19:25

## 2022-08-31 RX ADMIN — LEVOTHYROXINE SODIUM 88 MCG: 0.09 TABLET ORAL at 08:13

## 2022-08-31 RX ADMIN — BENZTROPINE MESYLATE 0.5 MG: 0.5 TABLET ORAL at 19:25

## 2022-08-31 RX ADMIN — BENZTROPINE MESYLATE 0.5 MG: 0.5 TABLET ORAL at 08:13

## 2022-08-31 RX ADMIN — DONEPEZIL HYDROCHLORIDE 10 MG: 10 TABLET ORAL at 19:25

## 2022-08-31 RX ADMIN — BUSPIRONE HYDROCHLORIDE 30 MG: 15 TABLET ORAL at 08:13

## 2022-08-31 RX ADMIN — MEGESTROL ACETATE 20 MG: 20 TABLET ORAL at 08:13

## 2022-08-31 RX ADMIN — MEMANTINE 10 MG: 10 TABLET ORAL at 08:13

## 2022-08-31 ASSESSMENT — ACTIVITIES OF DAILY LIVING (ADL)
ADLS_ACUITY_SCORE: 73

## 2022-08-31 NOTE — PLAN OF CARE
Assessment/Intervention/Current Symptoms and Care Coordination:     -Reviewed pt s chart  -New referral sent to [facility]  C/O Jasmin Childs  Phone: [phone]: Spoke with Jasmin, staff at [facility]. She confirmed receipt of the referral. Reported that she is currently reviewing pt's clinical documents and will reach back to writer between now and tomorrow to let writer know the status.    UPDATE: This patient has been accepted at the following facility: Waiting for letters of guardianship.  [facility]  Jasmin Childs, RN, BSN,PHN  [facility]  [address] [address]  Phone:[phone]  Fax:[phone]  [email or web address]   [email or web address]       As of last week, the status was  Received a call from [facility] staff, following a referral that was made by writer. Staff (Carlo - [phone]) reported that they are unable to accept the referral because:  1. He has an MA and has been since 2016  2. Has a managed care through [employer].  Carlo said she is declining the referral and will communicate to [facility] to take service the patient      Discharge Plan or Goal:     - Received information this morning that the   has ruled on this patient's guardianship: Final steps towards obtaining a letter of guardianship is in motion.  -Awaiting to receive the guardianship order and letter of guardianship  - Patient continues to stabilize at baseline.      Barriers to Discharge:  - Funding  - Receipt of guardianship order is expected.    - Patient is stable at his baseline. He can not care for himself, needs assist with all ADLs. Patient needs safe discharge plan.     Referral Status:  - [facility]  [facility]  - [facility] ([place] [facility] [place] will complete the assessment since the  has ruled on guardianship      Legal Status:  Court  Hold     Contact:  Joi Roque  [facility]   [facility]  Phone: [phone]    Fax: [phone]  Monday -Thursday 6:30 AM-4:00 PM,   Friday 6:30-10:30 AM

## 2022-08-31 NOTE — PLAN OF CARE
Problem: Behavior Regulation Impairment (Psychotic Signs/Symptoms)  Goal: Improved Behavioral Control (Psychotic Signs/Symptoms)  Outcome: Ongoing, Not Progressing   Goal Outcome Evaluation:    Plan of Care Reviewed With: patient     Patient's mood and affect remains the same. He is calm with a flat affect. He is isolative and withdrawn. He came out for dinner and ate about 50%. He drank ample amount fluids and went back to his room. He declined to attend groups. At around 7:00 p.m. he came out and sat in the lounge. He requested to turn on the TV, however he was told that it will turned on by 8:00 p.m after a group activity. Patient waited until the evening activity was done. He watched some programs for about half an hour and went back to his room. He laid down again in his bed. He was observed mumbling while looking at the ceiling. But he denied visual nor auditory hallucinations. He denied SI/SIB. Patient took all his bedtime medications then he went to bed after.

## 2022-08-31 NOTE — PROGRESS NOTES
PSYCHIATRY  PROGRESS NOTE     DATE OF SERVICE   08/31/2022       CHIEF COMPLAINT   Patient was admitted due to inability to safely care for himself and psychosis.       SUBJECTIVE   Nursing reports:   Patient's mood and affect remains the same. He is calm with a flat affect. He is isolative and withdrawn. He came out for dinner and ate about 50%. He drank ample amount fluids and went back to his room. He declined to attend groups. At around 7:00 p.m. he came out and sat in the lounge. He requested to turn on the TV, however he was told that it will turned on by 8:00 p.m after a group activity. Patient waited until the evening activity was done. He watched some programs for about half an hour and went back to his room. He laid down again in his bed. He was observed mumbling while looking at the ceiling. But he denied visual nor auditory hallucinations. He denied SI/SIB. Patient took all his bedtime medications then he went to bed after.     Patient has been discussed in detail with the  during team meeting. Sw has continue to follow on different referrals.     OBJECTIVE   Patient was seen and evaluated at bedside by himself, this was a face-to-face evaluation. Patient reported that he is feeling tired and denied any other issues. He was preoccupied with getting more juice. Remains confused and psychotic at his baseline.        MEDICATIONS   Medications:  Scheduled Meds:    aspirin  81 mg Oral Daily     benztropine  0.5 mg Oral BID     busPIRone HCl  30 mg Oral BID     cloZAPine  250 mg Oral At Bedtime     donepezil  10 mg Oral At Bedtime     gabapentin  100 mg Oral TID w/meals     levothyroxine  88 mcg Oral Daily     megestrol  20 mg Oral Daily     melatonin  5 mg Oral At Bedtime     memantine  10 mg Oral BID     mirtazapine  15 mg Oral At Bedtime     salmeterol  1 puff Inhalation BID     Continuous Infusions:  PRN Meds:.albuterol, alum & mag hydroxide-simethicone, benzocaine-menthol, hydrOXYzine,  "nicotine, nystatin, polyethylene glycol, polyethylene glycol-propylene glycol PF, senna-docusate    Medication adherence issues: MS Med Adherence Y/N: Yes, Hospitalization  Medication side effects: MEDICATION SIDE EFFECTS: no side effects reported  Benefit: Yes / No: Yes       ROS   A comprehensive review of systems was negative.       MENTAL STATUS EXAM   Vitals: /72 (BP Location: Left arm)   Pulse 100   Temp 98.2  F (36.8  C) (Oral)   Resp 16   Ht 1.778 m (5' 10\")   Wt 78.5 kg (173 lb 1 oz)   SpO2 98%   BMI 24.83 kg/m       Same as last visit  Appearance:  No apparent distress  Mood: \"Feeling tired\"  Affect: Calm  Suicidal Ideation: Denies  Homicidal Ideation: Denies  Thought process: Disorganized and concrete  Thought content: Remains confused and delusional and hallucinating at times.    Fund of Knowledge: Below average  Attention/Concentration: Poor  Language ability: Significantly impaired  Memory: Global memory impairment  Insight:  Poor.  Judgement: Poor  Orientation: Only to person  Psychomotor Behavior: slowed    Muscle Strength and Tone: MuscleStrength: Normal and Atrophy  Gait and Station: Not evaluated       LABS   personally reviewed.     No results found for: PHENYTOIN, PHENOBARB, VALPROATE, CBMZ       DIAGNOSIS   Principal Problem:    Major neurocognitive disorder, due to multiple etiologies, with behavioral disturbance, severe (H)    Active Problem List:  Patient Active Problem List   Diagnosis     TBI with aggressive behavior     HTN (hypertension)     COPD (chronic obstructive pulmonary disease) (H)     Dementia with behavioral disturbance (H)     Chronic schizophrenia (H)     Smoker     Agitation     Behavior disturbance     Psychosis (H)     Major neurocognitive disorder, due to multiple etiologies, with behavioral disturbance, severe (H)     Paranoid schizophrenia, chronic condition with acute exacerbation (H)     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 Ongoing education given regarding diagnostic and treatment options with risks, benefits and alternatives and adequate verbalization of understanding.  2.  Medications       BuSpar 30 mg 2 times daily       Cogentin 0.5 mg 2 times a day       Clozaril 250 mg at bedtime       Aricept 10 mg at bedtime       Gabapentin 100 mg 3 times a day       Namenda 10 mg 2 times a day       Remeron 15 mg at bedtime       melatonin 5 mg at bedtime  3.  Medical team following the patient   4.   coordinating a safe discharge plan with the patient.  5.  Labs have been ordered, liver functions remained stable consistent with the hep C diagnosis.      Risk Assessment: Health system RISK ASSESSMENT: Patient able to contract for safety    Coordination of Care:   Treatment Plan reviewed and physician signed, Care discussed with Care/Treatment Team Members, Chart reviewed and Patient seen      Re-Certification I certify that the inpatient psychiatric facility services furnished since the previous certification were, and continue to be, medically necessary for, either, treatment which could reasonably be expected to improve the patient s condition or diagnostic study and that the hospital records indicate that the services furnished were, either, intensive treatment services, admission and related services necessary for diagnostic study, or equivalent services.     I certify that the patient continues to need, on a daily basis, active treatment furnished directly by or requiring the supervision of inpatient psychiatric facility personnel.   I estimate 14 days of hospitalization is necessary for proper treatment of the patient. My plans for post-hospital care for this patient are  TBD     Ginger Dubon MD    -     08/31/2022  -     3:09 PM    Total time 15 minutes with >  50%spent on coordination of cares and psycho-education.    This note was created with help of Dragon dictation system. Grammatical / typing errors are not intentional.    Ginger Dubon MD

## 2022-08-31 NOTE — PLAN OF CARE
"  Problem: Behavior Regulation Impairment (Psychotic Signs/Symptoms)  Goal: Improved Behavioral Control (Psychotic Signs/Symptoms)  Outcome: Ongoing, Not Progressing     Problem: Behavioral Health Plan of Care  Goal: Plan of Care Review  Outcome: Ongoing, Progressing  Flowsheets (Taken 8/31/2022 1201)  Plan of Care Reviewed With: patient  Patient Agreement with Plan of Care: refuses to participate   Goal Outcome Evaluation:    Plan of Care Reviewed With: patient     Patient is calm, medication compliant. Isolative and withdrawn, denied psyche symptoms, no observed hallucinations. Patient is disheveled refused shower or clothing change. Fair appetite ate 50% of breakfast and 75% of lunch, patient refused to walk to meals stating \"I can't make it\" then headed back to room. Staff pushed patient in wheelchair to both meals. Patient did not attend groups and was not social with peers. No behavioral changes from baseline.    "

## 2022-08-31 NOTE — PLAN OF CARE
Problem: Sleep Disturbance (Psychotic Signs/Symptoms)  Goal: Improved Sleep (Psychotic Signs/Symptoms)  Outcome: Ongoing, Progressing   Goal Outcome Evaluation:    Patient slept a total of 10.75  hours. Did not void the whole night. No behavioral issues noted the whole night.

## 2022-09-01 PROCEDURE — 250N000013 HC RX MED GY IP 250 OP 250 PS 637: Performed by: PSYCHIATRY & NEUROLOGY

## 2022-09-01 PROCEDURE — 124N000003 HC R&B MH SENIOR/ADOLESCENT

## 2022-09-01 RX ADMIN — CLOZAPINE 250 MG: 100 TABLET ORAL at 20:05

## 2022-09-01 RX ADMIN — DONEPEZIL HYDROCHLORIDE 10 MG: 10 TABLET ORAL at 20:05

## 2022-09-01 RX ADMIN — Medication 5 MG: at 20:05

## 2022-09-01 RX ADMIN — MIRTAZAPINE 15 MG: 15 TABLET, FILM COATED ORAL at 20:06

## 2022-09-01 RX ADMIN — BENZTROPINE MESYLATE 0.5 MG: 0.5 TABLET ORAL at 20:05

## 2022-09-01 RX ADMIN — MEMANTINE 10 MG: 10 TABLET ORAL at 20:05

## 2022-09-01 RX ADMIN — GABAPENTIN 100 MG: 100 CAPSULE ORAL at 17:44

## 2022-09-01 RX ADMIN — BUSPIRONE HYDROCHLORIDE 30 MG: 15 TABLET ORAL at 20:05

## 2022-09-01 RX ADMIN — MEGESTROL ACETATE 20 MG: 20 TABLET ORAL at 08:49

## 2022-09-01 RX ADMIN — ASPIRIN 81 MG: 81 TABLET, COATED ORAL at 08:49

## 2022-09-01 RX ADMIN — SALMETEROL XINAFOATE 1 PUFF: 50 POWDER, METERED ORAL; RESPIRATORY (INHALATION) at 08:50

## 2022-09-01 RX ADMIN — GABAPENTIN 100 MG: 100 CAPSULE ORAL at 11:18

## 2022-09-01 RX ADMIN — MEMANTINE 10 MG: 10 TABLET ORAL at 08:49

## 2022-09-01 RX ADMIN — BUSPIRONE HYDROCHLORIDE 30 MG: 15 TABLET ORAL at 08:49

## 2022-09-01 RX ADMIN — SALMETEROL XINAFOATE 1 PUFF: 50 POWDER, METERED ORAL; RESPIRATORY (INHALATION) at 20:07

## 2022-09-01 RX ADMIN — GABAPENTIN 100 MG: 100 CAPSULE ORAL at 08:49

## 2022-09-01 RX ADMIN — LEVOTHYROXINE SODIUM 88 MCG: 0.09 TABLET ORAL at 08:49

## 2022-09-01 RX ADMIN — BENZTROPINE MESYLATE 0.5 MG: 0.5 TABLET ORAL at 08:49

## 2022-09-01 ASSESSMENT — ACTIVITIES OF DAILY LIVING (ADL)
ADLS_ACUITY_SCORE: 73

## 2022-09-01 NOTE — PLAN OF CARE
Problem: Behavior Regulation Impairment (Psychotic Signs/Symptoms)  Goal: Improved Behavioral Control (Psychotic Signs/Symptoms)  Outcome: Ongoing, Not Progressing   Goal Outcome Evaluation:    Plan of Care Reviewed With: patient     Patient did not come out for dinner. He said that he is tired and is not hungry too but he ate almost 50% of his dinner when his tray was brought to his room. Patient is calm with a flat affect. He was laying down in his bed quietly. He has not been observed responding to internal stimuli. He remains disheveled. Refused to take a shower. Refused to have his hair combed. He denied SI/SIB/HI. Patient is alert and oriented to self only. He took all his meds. No side effects observed nor reported. BP 99/65; P-101.

## 2022-09-01 NOTE — PLAN OF CARE
Problem: Mood Impairment (Psychotic Signs/Symptoms)  Goal: Improved Mood Symptoms (Psychotic Signs/Symptoms)  Outcome: Ongoing, Progressing      Plan of Care Reviewed With: patient    Pt alert, oriented to self, calm, cooperative, denies mental health issue, affect flat, intermittent eye contact, voice soft. Pt withdrawn and isolative.  Pt denies any pain, visible at millieu during meals otherwise staying in his room most of the shift. Pt appetite fair, ate 50% of his food, ambulatory with standby assist, pt medication complaint.

## 2022-09-01 NOTE — PLAN OF CARE
Goal Outcome Evaluation:    Plan of Care Reviewed With: patient      10:30 p.m. Female staff reported that as she passed by the patient's room, she observed that the patient was leaning against the cabinet in his room almost falling. Staff immediately helped the patient from falling. Patient did not hit his head nor he landed on his buttocks on the floor. No injuries noted. She noticed that the scrub pants of the patient was wet so got a clean one to change it but patient refused to have it changed. Two other staff re-approached the patient and offered to help change his scrub pants but again patient refused.     Few minutes, he was re-approached by the female staff and the latter was able to change the patient's scrub pants. Patient was sound asleep when re-checked by this writer.

## 2022-09-01 NOTE — PLAN OF CARE
"  Problem: Behavior Regulation Impairment (Psychotic Signs/Symptoms)  Goal: Improved Behavioral Control (Psychotic Signs/Symptoms)  Outcome: Ongoing, Progressing   Goal Outcome Evaluation: A & O to self. Denies all mental health symptoms. No hallucination or response to internal stimuli observed. Pt was up for breakfast and lunch 100%, fluids encouraged. Affect was flat, withdrawn but calm and cooperative. He was isolative in the room most of the day laying down. He had a bed bath and change of clothing. Medication compliant, no side effects reported or observed.VSS. BP 90/64   Pulse 70   Temp 97.3  F (36.3  C) (Temporal)   Resp 16   Ht 1.778 m (5' 10\")   Wt 79 kg (174 lb 2.6 oz)   SpO2 97%   BMI 24.99 kg/m       Plan of Care Reviewed With: patient                 "

## 2022-09-01 NOTE — PLAN OF CARE
Problem: Sleep Disturbance (Psychotic Signs/Symptoms)  Goal: Improved Sleep (Psychotic Signs/Symptoms)  Outcome: Ongoing, Progressing   Goal Outcome Evaluation:    Patient slept a total of 11 hours without any interruptions. No behavioral issues noted the whole shift.                RAD tech at bedside.

## 2022-09-02 LAB
BASOPHILS # BLD AUTO: 0.1 10E3/UL (ref 0–0.2)
BASOPHILS NFR BLD AUTO: 2 %
EOSINOPHIL # BLD AUTO: 1 10E3/UL (ref 0–0.7)
EOSINOPHIL NFR BLD AUTO: 22 %
ERYTHROCYTE [DISTWIDTH] IN BLOOD BY AUTOMATED COUNT: 14.3 % (ref 10–15)
HCT VFR BLD AUTO: 44.2 % (ref 40–53)
HGB BLD-MCNC: 14.9 G/DL (ref 13.3–17.7)
HOLD SPECIMEN: NORMAL
IMM GRANULOCYTES # BLD: 0 10E3/UL
IMM GRANULOCYTES NFR BLD: 0 %
LYMPHOCYTES # BLD AUTO: 1.1 10E3/UL (ref 0.8–5.3)
LYMPHOCYTES NFR BLD AUTO: 23 %
MCH RBC QN AUTO: 32.3 PG (ref 26.5–33)
MCHC RBC AUTO-ENTMCNC: 33.7 G/DL (ref 31.5–36.5)
MCV RBC AUTO: 96 FL (ref 78–100)
MONOCYTES # BLD AUTO: 0.4 10E3/UL (ref 0–1.3)
MONOCYTES NFR BLD AUTO: 9 %
NEUTROPHILS # BLD AUTO: 2.1 10E3/UL (ref 1.6–8.3)
NEUTROPHILS NFR BLD AUTO: 44 %
NRBC # BLD AUTO: 0 10E3/UL
NRBC BLD AUTO-RTO: 0 /100
PLATELET # BLD AUTO: 154 10E3/UL (ref 150–450)
RBC # BLD AUTO: 4.62 10E6/UL (ref 4.4–5.9)
WBC # BLD AUTO: 4.7 10E3/UL (ref 4–11)

## 2022-09-02 PROCEDURE — 99231 SBSQ HOSP IP/OBS SF/LOW 25: CPT | Performed by: PSYCHIATRY & NEUROLOGY

## 2022-09-02 PROCEDURE — 85025 COMPLETE CBC W/AUTO DIFF WBC: CPT | Performed by: PSYCHIATRY & NEUROLOGY

## 2022-09-02 PROCEDURE — 250N000013 HC RX MED GY IP 250 OP 250 PS 637: Performed by: PSYCHIATRY & NEUROLOGY

## 2022-09-02 PROCEDURE — 124N000003 HC R&B MH SENIOR/ADOLESCENT

## 2022-09-02 PROCEDURE — 36415 COLL VENOUS BLD VENIPUNCTURE: CPT | Performed by: PSYCHIATRY & NEUROLOGY

## 2022-09-02 RX ADMIN — CLOZAPINE 250 MG: 100 TABLET ORAL at 19:26

## 2022-09-02 RX ADMIN — BENZTROPINE MESYLATE 0.5 MG: 0.5 TABLET ORAL at 08:28

## 2022-09-02 RX ADMIN — MEMANTINE 10 MG: 10 TABLET ORAL at 08:29

## 2022-09-02 RX ADMIN — ASPIRIN 81 MG: 81 TABLET, COATED ORAL at 08:28

## 2022-09-02 RX ADMIN — BUSPIRONE HYDROCHLORIDE 30 MG: 15 TABLET ORAL at 19:27

## 2022-09-02 RX ADMIN — GABAPENTIN 100 MG: 100 CAPSULE ORAL at 17:39

## 2022-09-02 RX ADMIN — SALMETEROL XINAFOATE 1 PUFF: 50 POWDER, METERED ORAL; RESPIRATORY (INHALATION) at 19:27

## 2022-09-02 RX ADMIN — GABAPENTIN 100 MG: 100 CAPSULE ORAL at 13:01

## 2022-09-02 RX ADMIN — DONEPEZIL HYDROCHLORIDE 10 MG: 10 TABLET ORAL at 19:27

## 2022-09-02 RX ADMIN — GABAPENTIN 100 MG: 100 CAPSULE ORAL at 08:28

## 2022-09-02 RX ADMIN — LEVOTHYROXINE SODIUM 88 MCG: 0.09 TABLET ORAL at 08:29

## 2022-09-02 RX ADMIN — MEGESTROL ACETATE 20 MG: 20 TABLET ORAL at 08:28

## 2022-09-02 RX ADMIN — SALMETEROL XINAFOATE 1 PUFF: 50 POWDER, METERED ORAL; RESPIRATORY (INHALATION) at 08:29

## 2022-09-02 RX ADMIN — BUSPIRONE HYDROCHLORIDE 30 MG: 15 TABLET ORAL at 08:28

## 2022-09-02 RX ADMIN — MIRTAZAPINE 15 MG: 15 TABLET, FILM COATED ORAL at 19:27

## 2022-09-02 RX ADMIN — BENZTROPINE MESYLATE 0.5 MG: 0.5 TABLET ORAL at 19:27

## 2022-09-02 RX ADMIN — MEMANTINE 10 MG: 10 TABLET ORAL at 19:27

## 2022-09-02 RX ADMIN — Medication 5 MG: at 19:27

## 2022-09-02 ASSESSMENT — ACTIVITIES OF DAILY LIVING (ADL)
ADLS_ACUITY_SCORE: 73

## 2022-09-02 NOTE — PLAN OF CARE
Problem: Behavioral Health Plan of Care  Goal: Adheres to Safety Considerations for Self and Others  Outcome: Ongoing, Progressing  Intervention: Develop and Maintain Individualized Safety Plan  Recent Flowsheet Documentation  Taken 9/2/2022 1710 by Dann Maldonado, RN  Safety Measures: environmental rounds completed     Plan of Care Reviewed With: patient    Pt calm, cooperative, denies SI/SIB, no mental health issues, affect flat, intermittent eye contact, withdrawn and isolative.  Pt denies any pain, did not leave his room the whole shift, requested to eat dinner in his room, food tray brought to his room, ate 50% of his dinner. Pt medication compliant.

## 2022-09-02 NOTE — PLAN OF CARE
Problem: Sleep Disturbance (Psychotic Signs/Symptoms)  Goal: Improved Sleep (Psychotic Signs/Symptoms)  Outcome: Ongoing, Progressing   Goal Outcome Evaluation:    Patient slept a total of 10 hours without any interruptions. No behavioral concerns raised the whole shift.

## 2022-09-02 NOTE — PLAN OF CARE
"  Problem: Behavior Regulation Impairment (Psychotic Signs/Symptoms)  Goal: Improved Behavioral Control (Psychotic Signs/Symptoms)  Outcome: Met   Goal Outcome Evaluation:    Plan of Care Reviewed With: patient           Nursing Assessment    Psychosis (H) [F29]    Admit Date: 1/26/2022    Length of Stay: 219    Patient evaluation continues. Assessed mood,anxiety,thoughts and behavior. Patient is progressing towards goals. Patient is encouraged to participate in groups and assisted to develop healthy coping skills.  Patient admits to auditory hallucinations. /80   Pulse 109   Temp 97.9  F (36.6  C) (Temporal)   Resp 16   Ht 1.778 m (5' 10\")   Wt 79 kg (174 lb 2.6 oz)   SpO2 97%   BMI 24.99 kg/m      Mood: just ok    Patient reports depression no and reports anxiety a little    Affect:flat blunted    Sleep: good    Appetite: good    SI: denies    HI: denies    SIB: denies      Medication Compliance yes    Group participation:refuses    ADL's: assisted by staff when pt allows    Fall risk interventions: proper foot wear, orthostatic B/P.standby assist    Rinku Score Interventions:none    Discharge planning in process    Refer to daily team meeting notes for individualized plan of care. Nursing will continue to assess.    *Scale is 1-10 and 10 is the worst.             "
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
## 2022-09-02 NOTE — PLAN OF CARE
09/02/22 1443   Individualization/Patient Specific Goals   Patient Personal Strengths Resilient;    Patient Vulnerabilities lacks insight into illness   Anxieties, Fears or Concerns Patient lacks insight into his illness. He is at baseline and has delusions and hallucinations.   Individualized Care Needs Patient is on a commitment/pittman and guardianship case is pending.   Patient-Specific Goals (Include Timeframe) Continue with plan for stabilization 10-15 days   Interprofessional Rounds   Summary Patient continues to be stable at baseline. Patient has delusions and hallucinations. Patient is waiting for guardianship case to be finalized.   Participants nursing;psychiatrist;CTC;OT   Team Discussion   Participants Dr. Talita MD; Joi Stearns OT, FIFI Rivera, Wen Broderick RN   Progress Making progress, patient is at baseline   Anticipated length of stay 10-14 days   Continued Stay Criteria/Rationale Patient can not discharge safely. He needs assistance with all ADLs. Team is waiting on final guardianship ruling. Patient will discharge to a group home/assisted living.   Medical/Physical Please see H and P.   Plan Continue with plan: medication management, psychiatric evaluations, medical evaluations as needed, nursing assessments, milieu therapy, and CTC case management. Order for guardianship has been received. Awaiting letters of guardianship from the court.   Anticipated Discharge Disposition assisted living. Has been accepted into [facility]; awaiting letters of guardianship. Referring to nursing home.      PRECAUTIONS AND SAFETY               Behavioral Orders   Procedures     Code 1 - Restrict to Unit     Code 2       CT scan only     Code 3       Patient can go out of the unit with staff supervision. He needs to be taken out by him self with 2 staff and security present.     Fall precautions     Routine Programming       As clinically indicated     Status 15       Every 15 minutes.           Safety  Safety WDL: WDL  Patient Location: patient room, own  Observed Behavior: calm  Observed Behavior (Comment): laying in bed  Safety Measures: safety plan reviewed  Diversional Activity: other (see comments) (pt declined to come out of the roonm)  Suicidality: Status 15  Seizure precautions: clutter free environment  Assault: status 15  Elopement Assessment: Distress about legal situation (holds for mental health or criminal)  Elopement Interventions: status 15  Sexual: status 15, private room

## 2022-09-02 NOTE — PLAN OF CARE
Assessment/Intervention/Current Symptoms and Care Coordination:     -Reviewed pt s chart  -Rounded with team in review of pt's care and plan  -Awaiting letters of guardianship. Order for guardianship has been received; we are waiting for the guardianship letter     UPDATE: This patient has been accepted at the following facility: Waiting for letters of guardianship.  [facility]  Jasmin Childs RN, BSN,PHN  [facility]  [address] [facility] [address]  Phone:[phone]  Fax:[phone]  [email or web address]   [email or web address]        As of last week, the status was  Received a call from [facility] staff, following a referral that was made by writer. Staff (Carlo - [phone]) reported that they are unable to accept the referral because:  1. He has an MA and has been since 2016  2. Has a managed care through [employer].  Carlo said she is declining the referral and will communicate to [facility] to take service the patient      Discharge Plan or Goal:     - Received information this morning that the   has ruled on this patient's guardianship: Final steps towards obtaining a letter of guardianship is in motion.  -Awaiting to receive the guardianship order and letter of guardianship  - Patient continues to stabilize at baseline.      Barriers to Discharge:  - Funding  - Receipt of guardianship order is expected.    - Patient is stable at his baseline. He can not care for himself, needs assist with all ADLs. Patient needs safe discharge plan.     Referral Status:  - [facility]  [facility]  - [facility] (the [facility] assessment team will complete the assessment since the  has ruled on guardianship      Legal Status:  Court Hold     Contact:  Joi Roque  [facility]   [facility]  Phone: [phone]    Fax:  [phone]  Monday -Thursday 6:30 AM-4:00 PM,   Friday 6:30-10:30 AM

## 2022-09-03 PROCEDURE — 250N000013 HC RX MED GY IP 250 OP 250 PS 637: Performed by: PSYCHIATRY & NEUROLOGY

## 2022-09-03 PROCEDURE — 124N000003 HC R&B MH SENIOR/ADOLESCENT

## 2022-09-03 RX ADMIN — Medication 5 MG: at 20:26

## 2022-09-03 RX ADMIN — SALMETEROL XINAFOATE 1 PUFF: 50 POWDER, METERED ORAL; RESPIRATORY (INHALATION) at 08:50

## 2022-09-03 RX ADMIN — CLOZAPINE 250 MG: 100 TABLET ORAL at 20:30

## 2022-09-03 RX ADMIN — LEVOTHYROXINE SODIUM 88 MCG: 0.09 TABLET ORAL at 08:50

## 2022-09-03 RX ADMIN — BENZTROPINE MESYLATE 0.5 MG: 0.5 TABLET ORAL at 20:26

## 2022-09-03 RX ADMIN — MEMANTINE 10 MG: 10 TABLET ORAL at 08:50

## 2022-09-03 RX ADMIN — MEMANTINE 10 MG: 10 TABLET ORAL at 20:27

## 2022-09-03 RX ADMIN — DONEPEZIL HYDROCHLORIDE 10 MG: 10 TABLET ORAL at 20:27

## 2022-09-03 RX ADMIN — BENZTROPINE MESYLATE 0.5 MG: 0.5 TABLET ORAL at 08:50

## 2022-09-03 RX ADMIN — ASPIRIN 81 MG: 81 TABLET, COATED ORAL at 08:50

## 2022-09-03 RX ADMIN — MEGESTROL ACETATE 20 MG: 20 TABLET ORAL at 08:50

## 2022-09-03 RX ADMIN — GABAPENTIN 100 MG: 100 CAPSULE ORAL at 08:50

## 2022-09-03 RX ADMIN — MIRTAZAPINE 15 MG: 15 TABLET, FILM COATED ORAL at 20:30

## 2022-09-03 RX ADMIN — GABAPENTIN 100 MG: 100 CAPSULE ORAL at 12:52

## 2022-09-03 RX ADMIN — GABAPENTIN 100 MG: 100 CAPSULE ORAL at 17:25

## 2022-09-03 RX ADMIN — SALMETEROL XINAFOATE 1 PUFF: 50 POWDER, METERED ORAL; RESPIRATORY (INHALATION) at 20:27

## 2022-09-03 RX ADMIN — BUSPIRONE HYDROCHLORIDE 30 MG: 15 TABLET ORAL at 20:26

## 2022-09-03 RX ADMIN — BUSPIRONE HYDROCHLORIDE 30 MG: 15 TABLET ORAL at 08:50

## 2022-09-03 ASSESSMENT — ACTIVITIES OF DAILY LIVING (ADL)
ADLS_ACUITY_SCORE: 73

## 2022-09-03 NOTE — PLAN OF CARE
"  Problem: Behavioral Health Plan of Care  Goal: Plan of Care Review  Outcome: Ongoing, Progressing  Flowsheets (Taken 9/3/2022 1445)  Plan of Care Reviewed With: patient  Patient Agreement with Plan of Care: refuses to participate   Goal Outcome Evaluation:    Plan of Care Reviewed With: patient        Nursing Assessment    Psychosis (H) [F29]    Admit Date: 1/26/2022    Length of Stay: 220    Patient evaluation continues. Assessed mood,anxiety,thoughts and behavior. Patient is  progressing towards goals. Pt has been cooperative and calm. Patient is encouraged to participate in groups and assisted to develop healthy coping skills.  Patient admits to auditory hallucinations. /74 (BP Location: Left arm)   Pulse 89   Temp 97.1  F (36.2  C) (Temporal)   Resp 17   Ht 1.778 m (5' 10\")   Wt 79 kg (174 lb 2.6 oz)   SpO2 98%   BMI 24.99 kg/m      Mood: \"ok\"    Patient reports depression no and reports anxiety no     Affect:flat blunted    Sleep: good    Appetite: good    SI: denies    HI: denies    SIB: denies      Medication Compliance yes    Group participation:none    ADL's: assisted with basin bath set up and clean clothes    Fall risk interventions: proper foot wear, standby assist, orthostatic B/P    Rinku Score Interventions: none    Discharge planning in process    Refer to daily team meeting notes for individualized plan of care. Nursing will continue to assess.    *Scale is 1-10 and 10 is the worst.                "

## 2022-09-03 NOTE — PLAN OF CARE
Problem: Sleep Disturbance (Psychotic Signs/Symptoms)  Goal: Improved Sleep (Psychotic Signs/Symptoms)  Outcome: Ongoing, Progressing   Goal Outcome Evaluation:        Pt slept total of 8.5 hours. Continent of urine x 1. Unsteady on feet when assisted to bathroom.               Initial (On Arrival)

## 2022-09-04 PROCEDURE — 250N000013 HC RX MED GY IP 250 OP 250 PS 637: Performed by: PSYCHIATRY & NEUROLOGY

## 2022-09-04 PROCEDURE — 124N000003 HC R&B MH SENIOR/ADOLESCENT

## 2022-09-04 RX ADMIN — MEMANTINE 10 MG: 10 TABLET ORAL at 08:23

## 2022-09-04 RX ADMIN — BUSPIRONE HYDROCHLORIDE 30 MG: 15 TABLET ORAL at 08:23

## 2022-09-04 RX ADMIN — CLOZAPINE 250 MG: 100 TABLET ORAL at 19:09

## 2022-09-04 RX ADMIN — BENZTROPINE MESYLATE 0.5 MG: 0.5 TABLET ORAL at 19:09

## 2022-09-04 RX ADMIN — ASPIRIN 81 MG: 81 TABLET, COATED ORAL at 08:23

## 2022-09-04 RX ADMIN — LEVOTHYROXINE SODIUM 88 MCG: 0.09 TABLET ORAL at 08:23

## 2022-09-04 RX ADMIN — GABAPENTIN 100 MG: 100 CAPSULE ORAL at 19:08

## 2022-09-04 RX ADMIN — Medication 5 MG: at 19:09

## 2022-09-04 RX ADMIN — BENZTROPINE MESYLATE 0.5 MG: 0.5 TABLET ORAL at 08:23

## 2022-09-04 RX ADMIN — BUSPIRONE HYDROCHLORIDE 30 MG: 15 TABLET ORAL at 19:09

## 2022-09-04 RX ADMIN — MEMANTINE 10 MG: 10 TABLET ORAL at 19:09

## 2022-09-04 RX ADMIN — DONEPEZIL HYDROCHLORIDE 10 MG: 10 TABLET ORAL at 19:09

## 2022-09-04 RX ADMIN — SALMETEROL XINAFOATE 1 PUFF: 50 POWDER, METERED ORAL; RESPIRATORY (INHALATION) at 19:08

## 2022-09-04 RX ADMIN — MIRTAZAPINE 15 MG: 15 TABLET, FILM COATED ORAL at 19:09

## 2022-09-04 RX ADMIN — MEGESTROL ACETATE 20 MG: 20 TABLET ORAL at 08:23

## 2022-09-04 RX ADMIN — GABAPENTIN 100 MG: 100 CAPSULE ORAL at 08:23

## 2022-09-04 RX ADMIN — GABAPENTIN 100 MG: 100 CAPSULE ORAL at 13:42

## 2022-09-04 RX ADMIN — SALMETEROL XINAFOATE 1 PUFF: 50 POWDER, METERED ORAL; RESPIRATORY (INHALATION) at 08:24

## 2022-09-04 ASSESSMENT — ACTIVITIES OF DAILY LIVING (ADL)
ADLS_ACUITY_SCORE: 73

## 2022-09-04 NOTE — PLAN OF CARE
"Goal Outcome Evaluation:    Plan of Care Reviewed With: patient   Pt isolative and withdrawn to room  most of shift, attended 1 group this shift, denies all psych symptoms, pain, SI/HI/SIB ,  briefly answered to assessment questions stating \" I'm very tired, ate  25%  of supper meal in  his room, pt is med compliant  , minimally engaged with others, lying in bed most of the shift. VSS,  pt assisted with toiletting and tray set up.                "

## 2022-09-04 NOTE — PLAN OF CARE
"  Problem: Behavior Regulation Impairment (Psychotic Signs/Symptoms)  Goal: Improved Behavioral Control (Psychotic Signs/Symptoms)  Outcome: Ongoing, Progressing   Goal Outcome Evaluation:    Plan of Care Reviewed With: patient           Nursing Assessment    Psychosis (H) [F29]    Admit Date: 1/26/2022    Length of Stay: 221    Patient evaluation continues. Assessed mood,anxiety,thoughts and behavior. Patient is progressing towards goals. Patient is encouraged to participate in groups and assisted to develop healthy coping skills.  Patient denies auditory or visual hallucinations this shift. /89 (BP Location: Right arm, Patient Position: Supine, Cuff Size: Adult Small)   Pulse 95   Temp 97.6  F (36.4  C) (Temporal)   Resp 16   Ht 1.778 m (5' 10\")   Wt 79 kg (174 lb 2.6 oz)   SpO2 99%   BMI 24.99 kg/m      Mood: good    Patient reports depression none and reports anxiety none     Affect:flat blunted    Sleep: good    Appetite: good    SI: denies    HI: denies    SIB: denies      Medication Compliance yes    Group participation: bingo only    ADL's: assisted    Fall risk interventions: proper foot wear, orthostatic B/P, standby assist    Rinku Score Interventions:none    Discharge planning in process    Refer to daily team meeting notes for individualized plan of care. Nursing will continue to assess.    *Scale is 1-10 and 10 is the worst.             "

## 2022-09-04 NOTE — PLAN OF CARE
Problem: Sleep Disturbance  Goal: Adequate Sleep/Rest  Outcome: Ongoing, Progressing   Goal Outcome Evaluation:          The patient was in bed at the beginning of the shift. The patient's respiration was even and unlabored. Safety checks were performed every 15 minutes with no incidence. Will continue to monitor.

## 2022-09-05 PROCEDURE — 250N000013 HC RX MED GY IP 250 OP 250 PS 637: Performed by: PSYCHIATRY & NEUROLOGY

## 2022-09-05 PROCEDURE — 124N000003 HC R&B MH SENIOR/ADOLESCENT

## 2022-09-05 PROCEDURE — 99231 SBSQ HOSP IP/OBS SF/LOW 25: CPT | Performed by: PSYCHIATRY & NEUROLOGY

## 2022-09-05 RX ADMIN — BUSPIRONE HYDROCHLORIDE 30 MG: 15 TABLET ORAL at 20:32

## 2022-09-05 RX ADMIN — SALMETEROL XINAFOATE 1 PUFF: 50 POWDER, METERED ORAL; RESPIRATORY (INHALATION) at 20:33

## 2022-09-05 RX ADMIN — CLOZAPINE 250 MG: 100 TABLET ORAL at 20:32

## 2022-09-05 RX ADMIN — LEVOTHYROXINE SODIUM 88 MCG: 0.09 TABLET ORAL at 08:20

## 2022-09-05 RX ADMIN — MEMANTINE 10 MG: 10 TABLET ORAL at 20:33

## 2022-09-05 RX ADMIN — BUSPIRONE HYDROCHLORIDE 30 MG: 15 TABLET ORAL at 08:20

## 2022-09-05 RX ADMIN — MEGESTROL ACETATE 20 MG: 20 TABLET ORAL at 08:20

## 2022-09-05 RX ADMIN — GABAPENTIN 100 MG: 100 CAPSULE ORAL at 08:20

## 2022-09-05 RX ADMIN — BENZTROPINE MESYLATE 0.5 MG: 0.5 TABLET ORAL at 20:33

## 2022-09-05 RX ADMIN — GABAPENTIN 100 MG: 100 CAPSULE ORAL at 18:44

## 2022-09-05 RX ADMIN — GABAPENTIN 100 MG: 100 CAPSULE ORAL at 12:30

## 2022-09-05 RX ADMIN — MEMANTINE 10 MG: 10 TABLET ORAL at 08:20

## 2022-09-05 RX ADMIN — MIRTAZAPINE 15 MG: 15 TABLET, FILM COATED ORAL at 20:33

## 2022-09-05 RX ADMIN — Medication 5 MG: at 20:33

## 2022-09-05 RX ADMIN — DONEPEZIL HYDROCHLORIDE 10 MG: 10 TABLET ORAL at 20:33

## 2022-09-05 RX ADMIN — ASPIRIN 81 MG: 81 TABLET, COATED ORAL at 08:20

## 2022-09-05 RX ADMIN — BENZTROPINE MESYLATE 0.5 MG: 0.5 TABLET ORAL at 08:20

## 2022-09-05 RX ADMIN — SALMETEROL XINAFOATE 1 PUFF: 50 POWDER, METERED ORAL; RESPIRATORY (INHALATION) at 08:20

## 2022-09-05 ASSESSMENT — ACTIVITIES OF DAILY LIVING (ADL)
ADLS_ACUITY_SCORE: 73

## 2022-09-05 NOTE — PLAN OF CARE
"  Problem: Behavior Regulation Impairment (Psychotic Signs/Symptoms)  Goal: Improved Behavioral Control (Psychotic Signs/Symptoms)  Outcome: Met  Flowsheets (Taken 9/5/2022 1426)  Mutually Determined Action Steps (Improved Behavioral Control): verbalizes gratifying activity  Note: Likes lei   Goal Outcome Evaluation:Nursing Assessment    Psychosis (H) [F29]    Admit Date: 1/26/2022    Length of Stay: 222    Patient evaluation continues. Assessed mood,anxiety,thoughts and behavior. Patient is progressing towards goals. Patient is encouraged to participate in groups and assisted to develop healthy coping skills.  Patient admits to auditory hallucinations. /75   Pulse 88   Temp 97.4  F (36.3  C) (Temporal)   Resp 16   Ht 1.778 m (5' 10\")   Wt 79 kg (174 lb 2.6 oz)   SpO2 97%   BMI 24.99 kg/m      Mood: good    Patient reports depression none and reports anxiety none    Affect:flat blunted    Sleep: good 8-9 hours last night    Appetite: good    SI: denies    HI: denies    SIB: denies      Medication Compliance: yes    Group participation:no except lei    ADL's: assisted with cares by staff when pt allows    Fall risk interventions: proper foot wear, orthostatic B/P, stand by assist    Rinku Score Interventions:none    Discharge planning in process    Refer to daily team meeting notes for individualized plan of care. Nursing will continue to assess.    *Scale is 1-10 and 10 is the worst.           Plan of Care Reviewed With: patient                 "

## 2022-09-05 NOTE — PROGRESS NOTES
"PSYCHIATRY  PROGRESS NOTE     DATE OF SERVICE   09/05/2022       CHIEF COMPLAINT   Patient was admitted due to inability to safely care for himself and psychosis.       SUBJECTIVE   Nursing reports:   Pt is alert and oriented to self only. Presents with a flat/blunted affect, calm mood. Pt denies anxiety, depression, SI/SIB/HI/AVH or any physical concerns other than \"feeling tired\". Pt remained withdrawn and isolative to his room in bed all shift and only came out for dinner. Pt declined to come out for unit activities, or to get a shower this shift, stating \"I'm tired\".   Pt ate about 25% of his dinner, drank a total of about 600 ml of fluids, medication compliant.         Patient has been discussed in detail with the  during team meeting.  There has been no updates on patient's discharge.     OBJECTIVE   Patient was seen and evaluated at bedside by himself, this was a face-to-face evaluation.  Patient continues to verbalize feeling tired but denied any other psychiatric symptoms.  The patient stated that he has been finding.  He is preoccupied with getting more juice.  Continues to be oriented only to self and at times hallucinating.  Remains delusional and grandiose at times.       MEDICATIONS   Medications:  Scheduled Meds:    aspirin  81 mg Oral Daily     benztropine  0.5 mg Oral BID     busPIRone HCl  30 mg Oral BID     cloZAPine  250 mg Oral At Bedtime     donepezil  10 mg Oral At Bedtime     gabapentin  100 mg Oral TID w/meals     levothyroxine  88 mcg Oral Daily     megestrol  20 mg Oral Daily     melatonin  5 mg Oral At Bedtime     memantine  10 mg Oral BID     mirtazapine  15 mg Oral At Bedtime     salmeterol  1 puff Inhalation BID     Continuous Infusions:  PRN Meds:.albuterol, alum & mag hydroxide-simethicone, benzocaine-menthol, hydrOXYzine, nicotine, nystatin, polyethylene glycol, polyethylene glycol-propylene glycol PF, senna-docusate    Medication adherence issues: MS Med Adherence Y/N: " "Yes, Hospitalization  Medication side effects: MEDICATION SIDE EFFECTS: no side effects reported  Benefit: Yes / No: Yes       ROS   A comprehensive review of systems was negative.       MENTAL STATUS EXAM   Vitals: /75   Pulse 88   Temp 97.4  F (36.3  C) (Temporal)   Resp 16   Ht 1.778 m (5' 10\")   Wt 79 kg (174 lb 2.6 oz)   SpO2 97%   BMI 24.99 kg/m       Same as last visit  Appearance:  No apparent distress  Mood: \"Feeling tired\"  Affect: Calm  Suicidal Ideation: Denies  Homicidal Ideation: Denies  Thought process: Disorganized and concrete  Thought content: Remains confused and delusional and hallucinating at times.    Fund of Knowledge: Below average  Attention/Concentration: Poor  Language ability: Significantly impaired  Memory: Global memory impairment  Insight:  Poor.  Judgement: Poor  Orientation: Only to person  Psychomotor Behavior: slowed    Muscle Strength and Tone: MuscleStrength: Normal and Atrophy  Gait and Station: Not evaluated       LABS   personally reviewed.     No results found for: PHENYTOIN, PHENOBARB, VALPROATE, CBMZ       DIAGNOSIS   Principal Problem:    Major neurocognitive disorder, due to multiple etiologies, with behavioral disturbance, severe (H)    Active Problem List:  Patient Active Problem List   Diagnosis     TBI with aggressive behavior     HTN (hypertension)     COPD (chronic obstructive pulmonary disease) (H)     Dementia with behavioral disturbance (H)     Chronic schizophrenia (H)     Smoker     Agitation     Behavior disturbance     Psychosis (H)     Major neurocognitive disorder, due to multiple etiologies, with behavioral disturbance, severe (H)     Paranoid schizophrenia, chronic condition with acute exacerbation (H)     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 Ongoing education given " regarding diagnostic and treatment options with risks, benefits and alternatives and adequate verbalization of understanding.  2.  Medications       BuSpar 30 mg 2 times daily       Cogentin 0.5 mg 2 times a day       Clozaril 250 mg at bedtime       Aricept 10 mg at bedtime       Gabapentin 100 mg 3 times a day       Namenda 10 mg 2 times a day       Remeron 15 mg at bedtime       melatonin 5 mg at bedtime  3.  Medical team following the patient   4.   coordinating a safe discharge plan with the patient.  5.  Labs have been ordered, liver functions remained stable consistent with the hep C diagnosis.      Risk Assessment: [facility] RISK ASSESSMENT: Patient able to contract for safety    Coordination of Care:   Treatment Plan reviewed and physician signed, Care discussed with Care/Treatment Team Members, Chart reviewed and Patient seen      Re-Certification I certify that the inpatient psychiatric facility services furnished since the previous certification were, and continue to be, medically necessary for, either, treatment which could reasonably be expected to improve the patient s condition or diagnostic study and that the hospital records indicate that the services furnished were, either, intensive treatment services, admission and related services necessary for diagnostic study, or equivalent services.     I certify that the patient continues to need, on a daily basis, active treatment furnished directly by or requiring the supervision of inpatient psychiatric facility personnel.   I estimate 14 days of hospitalization is necessary for proper treatment of the patient. My plans for post-hospital care for this patient are  TBD     Ginger Dubon MD    -     09/05/2022  -     11:21 AM    Total time 15 minutes with > 50%spent on coordination of cares and psycho-education.    This note was created with help of Dragon dictation system. Grammatical / typing errors are not intentional.    Ginger  MD Ling

## 2022-09-05 NOTE — PLAN OF CARE
"  Problem: Behavioral Health Plan of Care  Goal: Plan of Care Review  Outcome: Ongoing, Progressing   Goal Outcome Evaluation:     Plan of Care Reviewed With: patient     Pt is alert and oriented to self only. Presents with a flat/blunted affect, calm mood. Pt denies anxiety, depression, SI/SIB/HI/AVH or any physical concerns other than \"feeling tired\". Pt remained withdrawn and isolative to his room in bed all shift and only came out for dinner. Pt declined to come out for unit activities, or to get a shower this shift, stating \"I'm tired\".   Pt ate about 25% of his dinner, drank a total of about 600 ml of fluids, medication compliant.          "

## 2022-09-05 NOTE — PLAN OF CARE
Problem: Sleep Disturbance (Psychotic Signs/Symptoms)  Goal: Improved Sleep (Psychotic Signs/Symptoms)  Outcome: Ongoing, Progressing   Goal Outcome Evaluation:    Patient with hrs of sleep. Has remained in his room sleeping uninterrupted for the entire shift. Continent of urine.  No concerns reported. 10 hrs of sleep.

## 2022-09-06 PROCEDURE — 99231 SBSQ HOSP IP/OBS SF/LOW 25: CPT | Performed by: PSYCHIATRY & NEUROLOGY

## 2022-09-06 PROCEDURE — 124N000003 HC R&B MH SENIOR/ADOLESCENT

## 2022-09-06 PROCEDURE — 250N000013 HC RX MED GY IP 250 OP 250 PS 637: Performed by: PSYCHIATRY & NEUROLOGY

## 2022-09-06 RX ADMIN — SALMETEROL XINAFOATE 1 PUFF: 50 POWDER, METERED ORAL; RESPIRATORY (INHALATION) at 20:04

## 2022-09-06 RX ADMIN — Medication 5 MG: at 19:57

## 2022-09-06 RX ADMIN — SALMETEROL XINAFOATE 1 PUFF: 50 POWDER, METERED ORAL; RESPIRATORY (INHALATION) at 08:33

## 2022-09-06 RX ADMIN — GABAPENTIN 100 MG: 100 CAPSULE ORAL at 08:33

## 2022-09-06 RX ADMIN — GABAPENTIN 100 MG: 100 CAPSULE ORAL at 12:15

## 2022-09-06 RX ADMIN — MIRTAZAPINE 15 MG: 15 TABLET, FILM COATED ORAL at 19:58

## 2022-09-06 RX ADMIN — DONEPEZIL HYDROCHLORIDE 10 MG: 10 TABLET ORAL at 19:57

## 2022-09-06 RX ADMIN — BUSPIRONE HYDROCHLORIDE 30 MG: 15 TABLET ORAL at 08:33

## 2022-09-06 RX ADMIN — BENZTROPINE MESYLATE 0.5 MG: 0.5 TABLET ORAL at 08:33

## 2022-09-06 RX ADMIN — BENZTROPINE MESYLATE 0.5 MG: 0.5 TABLET ORAL at 19:57

## 2022-09-06 RX ADMIN — MEGESTROL ACETATE 20 MG: 20 TABLET ORAL at 08:33

## 2022-09-06 RX ADMIN — MEMANTINE 10 MG: 10 TABLET ORAL at 08:33

## 2022-09-06 RX ADMIN — HYDROXYZINE HYDROCHLORIDE 25 MG: 25 TABLET, FILM COATED ORAL at 20:44

## 2022-09-06 RX ADMIN — MEMANTINE 10 MG: 10 TABLET ORAL at 19:57

## 2022-09-06 RX ADMIN — LEVOTHYROXINE SODIUM 88 MCG: 0.09 TABLET ORAL at 08:33

## 2022-09-06 RX ADMIN — ASPIRIN 81 MG: 81 TABLET, COATED ORAL at 08:33

## 2022-09-06 RX ADMIN — GABAPENTIN 100 MG: 100 CAPSULE ORAL at 17:39

## 2022-09-06 RX ADMIN — CLOZAPINE 250 MG: 100 TABLET ORAL at 19:57

## 2022-09-06 RX ADMIN — BUSPIRONE HYDROCHLORIDE 30 MG: 15 TABLET ORAL at 19:57

## 2022-09-06 ASSESSMENT — ACTIVITIES OF DAILY LIVING (ADL)
ADLS_ACUITY_SCORE: 73

## 2022-09-06 NOTE — PROGRESS NOTES
"PSYCHIATRY  PROGRESS NOTE     DATE OF SERVICE   09/06/2022       CHIEF COMPLAINT   Patient was admitted due to inability to safely care for himself and psychosis.       SUBJECTIVE   Nursing reports:   Pt remained isolative to his room, came out for breakfast with mininal prompting and standby assist by staff with ambulation.  Ate 100 % of breakfast , Medication compliant  Cooperative with bed linen change. Untidy and disheveled , agreed to change scrubs and pull ups.  Overall calm, cooperative but affect flat and  blunted. Not observed responding to hallucinations. Denies MH issues but felt frustrated about extended hospital stay,denies feeling suicidal.  Asked \" When can I go home?\". Per [facility] guardianship needs to be in place before discharge planning  Reported feeling tired at lunch time, requested to have his tray brought to his room, ate poorly     Patient has been discussed in detail with the  during team meeting.  There has been no updates on patient's discharge.     OBJECTIVE   Patient was seen and evaluated at bedside by himself, this was a face-to-face evaluation.  Patient reported that today he is doing \"all right\".  He also ask about when he will be able to leave the hospital.  I discussed with the patient that the  is trying to finalize the referrals for nursing homes and make sure that we have everything ready in terms of his guardianship.  The patient verbalized poor understanding of this information but stated that he is okay with that.  Denies all other psychiatric symptoms and was able to contract for safety.         MEDICATIONS   Medications:  Scheduled Meds:    aspirin  81 mg Oral Daily     benztropine  0.5 mg Oral BID     busPIRone HCl  30 mg Oral BID     cloZAPine  250 mg Oral At Bedtime     donepezil  10 mg Oral At Bedtime     gabapentin  100 mg Oral TID w/meals     levothyroxine  88 mcg Oral Daily     megestrol  20 mg Oral Daily     melatonin  5 mg Oral At Bedtime     " "memantine  10 mg Oral BID     mirtazapine  15 mg Oral At Bedtime     salmeterol  1 puff Inhalation BID     Continuous Infusions:  PRN Meds:.albuterol, alum & mag hydroxide-simethicone, benzocaine-menthol, hydrOXYzine, nicotine, nystatin, polyethylene glycol, polyethylene glycol-propylene glycol PF, senna-docusate    Medication adherence issues: MS Med Adherence Y/N: Yes, Hospitalization  Medication side effects: MEDICATION SIDE EFFECTS: no side effects reported  Benefit: Yes / No: Yes       ROS   A comprehensive review of systems was negative.       MENTAL STATUS EXAM   Vitals: /78 (BP Location: Right arm)   Pulse 106   Temp 98  F (36.7  C) (Temporal)   Resp 16   Ht 1.778 m (5' 10\")   Wt 79 kg (174 lb 2.6 oz)   SpO2 100%   BMI 24.99 kg/m         Appearance:  No apparent distress  Mood: \"Feeling all right\"  Affect: Calm  Suicidal Ideation: Denies  Homicidal Ideation: Denies  Thought process: Disorganized and concrete  Thought content: Remains confused and delusional and hallucinating at times.    Fund of Knowledge: Below average  Attention/Concentration: Poor  Language ability: Significantly impaired  Memory: Global memory impairment  Insight:  Poor.  Judgement: Poor  Orientation: Only to person  Psychomotor Behavior: slowed    Muscle Strength and Tone: MuscleStrength: Normal and Atrophy  Gait and Station: Not evaluated       LABS   personally reviewed.     No results found for: PHENYTOIN, PHENOBARB, VALPROATE, CBMZ       DIAGNOSIS   Principal Problem:    Major neurocognitive disorder, due to multiple etiologies, with behavioral disturbance, severe (H)    Active Problem List:  Patient Active Problem List   Diagnosis     TBI with aggressive behavior     HTN (hypertension)     COPD (chronic obstructive pulmonary disease) (H)     Dementia with behavioral disturbance (H)     Chronic schizophrenia (H)     Smoker     Agitation     Behavior disturbance     Psychosis (H)     Major neurocognitive disorder, due to " multiple etiologies, with behavioral disturbance, severe (H)     Paranoid schizophrenia, chronic condition with acute exacerbation (H)     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 Ongoing education given regarding diagnostic and treatment options with risks, benefits and alternatives and adequate verbalization of understanding.  2.  Medications       BuSpar 30 mg 2 times daily       Cogentin 0.5 mg 2 times a day       Clozaril 250 mg at bedtime       Aricept 10 mg at bedtime       Gabapentin 100 mg 3 times a day       Namenda 10 mg 2 times a day       Remeron 15 mg at bedtime       melatonin 5 mg at bedtime  3.  Medical team following the patient   4.   coordinating a safe discharge plan with the patient.  5.  Labs have been ordered, liver functions remained stable consistent with the hep C diagnosis.      Risk Assessment: [facility] RISK ASSESSMENT: Patient able to contract for safety    Coordination of Care:   Treatment Plan reviewed and physician signed, Care discussed with Care/Treatment Team Members, Chart reviewed and Patient seen      Re-Certification I certify that the inpatient psychiatric facility services furnished since the previous certification were, and continue to be, medically necessary for, either, treatment which could reasonably be expected to improve the patient s condition or diagnostic study and that the hospital records indicate that the services furnished were, either, intensive treatment services, admission and related services necessary for diagnostic study, or equivalent services.     I certify that the patient continues to need, on a daily basis, active treatment furnished directly by or requiring the supervision of inpatient psychiatric facility personnel.   I estimate 14 days of hospitalization is  necessary for proper treatment of the patient. My plans for post-hospital care for this patient are  TBD     Ginger Dubon MD    -     09/06/2022  -     4:22 PM    Total time 15 minutes with > 50%spent on coordination of cares and psycho-education.    This note was created with help of Dragon dictation system. Grammatical / typing errors are not intentional.    Ginger Dubon MD

## 2022-09-06 NOTE — PLAN OF CARE
"  Problem: Mood Impairment (Psychotic Signs/Symptoms)  Goal: Improved Mood Symptoms (Psychotic Signs/Symptoms)  Outcome: Ongoing, Not Progressing     Problem: Psychomotor Impairment (Psychotic Signs/Symptoms)  Goal: Improved Psychomotor Symptoms (Psychotic Signs/Symptoms)  Outcome: Ongoing, Not Progressing     Pt remained isolative to his room, came out for breakfast with mininal prompting and standby assist by staff with ambulation.  Ate 100 % of breakfast , Medication compliant  Cooperative with bed linen change. Untidy and disheveled , agreed to change scrubs and pull ups.  Overall calm, cooperative but affect flat and  blunted. Not observed responding to hallucinations. Denies MH issues but felt frustrated about extended hospital stay,denies feeling suicidal.  Asked \" When can I go home?\". Per [facility] guardianship needs to be in place before discharge planning  Reported feeling tired at lunch time, requested to have his tray brought to his room, ate poorly    "

## 2022-09-06 NOTE — PLAN OF CARE
"  Problem: Cognitive Impairment (Psychotic Signs/Symptoms)  Goal: Optimal Cognitive Function (Psychotic Signs/Symptoms)  Intervention: Support and Promote Cognitive Ability  Recent Flowsheet Documentation  Taken 9/5/2022 0920 by Wen Broderick RN  Trust Relationship/Rapport:   care explained   choices provided   questions encouraged   reassurance provided   thoughts/feelings acknowledged   Goal Outcome Evaluation:    Plan of Care Reviewed With: patient      Nursing Assessment    Psychosis (H) [F29]    Admit Date: 1/26/2022    Length of Stay: 222    Patient evaluation continues. Assessed mood,anxiety,thoughts and behavior. Patient is progressing towards goals. Patient is encouraged to participate in groups and assisted to develop healthy coping skills.  Patient admits to  auditory  hallucinations. /85 (BP Location: Right arm, Patient Position: Supine)   Pulse 94   Temp 97.1  F (36.2  C) (Temporal)   Resp 16   Ht 1.778 m (5' 10\")   Wt 79 kg (174 lb 2.6 oz)   SpO2 99%   BMI 24.99 kg/m      Mood: good    Patient reports depression none and reports anxiety none    Affect:bright    Sleep: good 9 hours last night, napped during the day    Appetite: good, snacks also    SI: denies    HI: denies    SIB: denies      Medication Compliance yes    Group participation:no    ADL's: refuses but allows writer to assist at times with wash basin    Fall risk interventions: proper foot wear, orthostatic B/P standby assist    Rinku Score Interventions:none    Discharge planning in process    Refer to daily team meeting notes for individualized plan of care. Nursing will continue to assess.    *Scale is 1-10 and 10 is the worst.                  "

## 2022-09-06 NOTE — PLAN OF CARE
"Assessment/Intervention/Current Symptoms and Care Coordination:     -Reviewed pt s chart  -Rounded with team in review of pt's care and plan  -Awaiting letters of guardianship. Order for guardianship has been received; we are waiting for the guardianship letter  -Called the court and left a message requesting status information on the letter of guardianship  -Made new referrals to nursing homes - [facility], and [facility].  -Called Jo Bush at [phone] and left a message for her requesting to know the status of the referral ([facility], covering for \[facility] [place] [facility]\"").    - Received feedback from [employer] this morning stating \"Hello thank you for the update. As far as I know we need to know who the guardian is so we can coordinate with them for the [facility] meeting.\"      UPDATE: This patient has been accepted at the following facility: Waiting for letters of guardianship.  [facility]  Jasmin Childs RN, BSN,PHN  [facility]  [address] [place] [address]  Phone:[phone]  Fax:[phone]  [email or web address]   [email or web address]/[email or web address]        As of last week, the status was  Received a call from [employer] staff, following a referral that was made by writer. Staff (Carlo - [phone]) reported that they are unable to accept the referral because:  1. He has an MA and has been since 2016  2. Has a managed care through [facility].  Carlo said she is declining the referral and will communicate to [facility] to take service the patient      Discharge Plan or Goal:     - Received information this morning that the   has ruled on this patient's guardianship: Final steps towards obtaining a letter of guardianship is in motion.  -Awaiting to receive the guardianship order and letter of guardianship  - Patient continues to stabilize at baseline.      Barriers to Discharge:  - Funding  - Receipt of " guardianship order is expected.    - Patient is stable at his baseline. He can not care for himself, needs assist with all ADLs. Patient needs safe discharge plan.     Referral Status:  - [facility]  - [place] ([place] complete the assessment since the  has ruled on guardianship      Legal Status:  Court Hold     Contact:  Joi Roque  [facility]   [facility]  Phone: [phone]    Fax: [phone]  Monday -Thursday 6:30 AM-4:00 PM,   Friday 6:30-10:30 AM

## 2022-09-07 PROCEDURE — 99231 SBSQ HOSP IP/OBS SF/LOW 25: CPT | Performed by: PSYCHIATRY & NEUROLOGY

## 2022-09-07 PROCEDURE — 250N000013 HC RX MED GY IP 250 OP 250 PS 637: Performed by: PSYCHIATRY & NEUROLOGY

## 2022-09-07 PROCEDURE — 124N000003 HC R&B MH SENIOR/ADOLESCENT

## 2022-09-07 RX ADMIN — MEGESTROL ACETATE 20 MG: 20 TABLET ORAL at 08:29

## 2022-09-07 RX ADMIN — ASPIRIN 81 MG: 81 TABLET, COATED ORAL at 08:30

## 2022-09-07 RX ADMIN — BUSPIRONE HYDROCHLORIDE 30 MG: 15 TABLET ORAL at 20:37

## 2022-09-07 RX ADMIN — MEMANTINE 10 MG: 10 TABLET ORAL at 20:36

## 2022-09-07 RX ADMIN — BENZTROPINE MESYLATE 0.5 MG: 0.5 TABLET ORAL at 20:37

## 2022-09-07 RX ADMIN — BUSPIRONE HYDROCHLORIDE 30 MG: 15 TABLET ORAL at 08:30

## 2022-09-07 RX ADMIN — BENZTROPINE MESYLATE 0.5 MG: 0.5 TABLET ORAL at 08:30

## 2022-09-07 RX ADMIN — DONEPEZIL HYDROCHLORIDE 10 MG: 10 TABLET ORAL at 20:37

## 2022-09-07 RX ADMIN — LEVOTHYROXINE SODIUM 88 MCG: 0.09 TABLET ORAL at 08:30

## 2022-09-07 RX ADMIN — SALMETEROL XINAFOATE 1 PUFF: 50 POWDER, METERED ORAL; RESPIRATORY (INHALATION) at 20:38

## 2022-09-07 RX ADMIN — GABAPENTIN 100 MG: 100 CAPSULE ORAL at 17:25

## 2022-09-07 RX ADMIN — MIRTAZAPINE 15 MG: 15 TABLET, FILM COATED ORAL at 20:36

## 2022-09-07 RX ADMIN — GABAPENTIN 100 MG: 100 CAPSULE ORAL at 08:30

## 2022-09-07 RX ADMIN — Medication 5 MG: at 20:37

## 2022-09-07 RX ADMIN — CLOZAPINE 250 MG: 100 TABLET ORAL at 20:36

## 2022-09-07 RX ADMIN — GABAPENTIN 100 MG: 100 CAPSULE ORAL at 12:36

## 2022-09-07 RX ADMIN — MEMANTINE 10 MG: 10 TABLET ORAL at 08:30

## 2022-09-07 RX ADMIN — SALMETEROL XINAFOATE 1 PUFF: 50 POWDER, METERED ORAL; RESPIRATORY (INHALATION) at 08:32

## 2022-09-07 ASSESSMENT — ACTIVITIES OF DAILY LIVING (ADL)
ADLS_ACUITY_SCORE: 73

## 2022-09-07 NOTE — PLAN OF CARE
Goal Outcome Evaluation:    Plan of Care Reviewed With: patient       Patient slept a total of 9 hours without any interruptions. No behavioral concerns raised the whole night.

## 2022-09-07 NOTE — PLAN OF CARE
"  Problem: Depression  Goal: Improved Mood  Outcome: Ongoing, Not Progressing   Goal Outcome Evaluation:    Patient was isolative in his room most of the shift. He did not participate in the community meeting and therapeutic group. He feels down and weary today. Feels sad and depressed. Denies feeling suicidal. He did not respond when asked to rate his depression and anxiety. Affect remains flat and blunt. Responds with one word answers when being talked too. Got very upset and irritable when he was told that [facility] writer will do his Covid test today per doctor's order. He yelled at this writer and said, \"Get out of my room.\"      Complained of anxiety and anxiety medication was offered and patient agreed to take it. Hydroxyzine 25 mg. given @ 2044 PRN for anxiety.     Took all his bedtime medications without any difficulty.          "

## 2022-09-07 NOTE — PLAN OF CARE
Goal Outcome Evaluation:    Plan of Care Reviewed With: patient      Goal Outcome Evaluation:     Plan of Care Reviewed With: patient     Patient alert and oriented to person, appeared untidy. Pt has been irritable, pleasant, calm, uncooperative at times, medication compliant. No PRN's given. Pt on falls precautions, no behaviors observed, staff will provide SBA of 1 for ambulation. Pt isolated in room this shift, appropriate with peers and staff. Pt did not completely engage in assessment, mind wandering/impaired concentration. Pt demonstrated ability to communicate needs to to this writer. Pt continues to decline Covid swab. Will continue to monitor behavior and encourage engagement. Declined covid swab.    NURSING ASSESSMENT  Pain: denied  Anxiety: WERO pt did not respond  Depression: WERO pt did not respond  SI: denies  SIB: WERO pt did not respond  HI: WERO pt did not respond  AVH: WERO pt did not respond, did not appear to be responding to internal stimuli, did not demonstrate delusional thinking.  Mood/Affect: blunted   Sleep: slept 9 hours per AM report  Medication: compliant, no side effects reported or observed  PRN: none  Appetite: breakfast  0%   lunch 50%  ADLs: independent   Visits: none  Vitals: WNL   Medical:  no issues reported or observed - declined covid swab       Problem: Behavioral Health Plan of Care  Goal: Plan of Care Review  Outcome: Ongoing, Progressing  Flowsheets (Taken 9/7/2022 1025)  Plan of Care Reviewed With: patient  Patient Agreement with Plan of Care: refuses to participate     Problem: Behavioral Health Plan of Care  Goal: Optimized Coping Skills in Response to Life Stressors  Intervention: Promote Effective Coping Strategies  Recent Flowsheet Documentation  Taken 9/7/2022 1025 by Lu Clark, RN  Supportive Measures:    positive reinforcement provided    self-care encouraged    verbalization of feelings encouraged     Problem: Cognitive Impairment (Psychotic  Signs/Symptoms)  Goal: Optimal Cognitive Function (Psychotic Signs/Symptoms)  Intervention: Support and Promote Cognitive Ability  Recent Flowsheet Documentation  Taken 9/7/2022 1025 by Lu Clark RN  Trust Relationship/Rapport:    care explained    choices provided    emotional support provided    empathic listening provided    questions encouraged    thoughts/feelings acknowledged     Problem: Decreased Participation and Engagement (Psychotic Signs/Symptoms)  Goal: Increased Participation and Engagement (Psychotic Signs/Symptoms)  Outcome: Ongoing, Progressing  Intervention: Facilitate Participation and Engagement  Recent Flowsheet Documentation  Taken 9/7/2022 1025 by Lu Clark RN  Supportive Measures:    positive reinforcement provided    self-care encouraged    verbalization of feelings encouraged     Problem: Mood Impairment (Psychotic Signs/Symptoms)  Goal: Improved Mood Symptoms (Psychotic Signs/Symptoms)  Outcome: Ongoing, Progressing  Intervention: Optimize Emotion and Mood  Recent Flowsheet Documentation  Taken 9/7/2022 1025 by Lu Clark RN  Supportive Measures:    positive reinforcement provided    self-care encouraged    verbalization of feelings encouraged     Problem: Social, Occupational or Functional Impairment (Psychotic Signs/Symptoms)  Goal: Enhanced Social, Occupational or Functional Skills (Psychotic Signs/Symptoms)  Outcome: Ongoing, Progressing  Intervention: Promote Social, Occupational and Functional Ability  Recent Flowsheet Documentation  Taken 9/7/2022 1025 by Lu Clark RN  Trust Relationship/Rapport:    care explained    choices provided    emotional support provided    empathic listening provided    questions encouraged    thoughts/feelings acknowledged     Problem: Suicidal Behavior  Goal: Suicidal Behavior is Absent or Managed  Outcome: Ongoing, Progressing

## 2022-09-07 NOTE — PROGRESS NOTES
"PSYCHIATRY  PROGRESS NOTE     DATE OF SERVICE   09/07/2022       CHIEF COMPLAINT   Patient was admitted due to inability to safely care for himself and psychosis.       SUBJECTIVE   Nursing reports:   Patient alert and oriented to person, appeared untidy. Pt has been irritable, pleasant, calm, uncooperative at times, medication compliant. No PRN's given. Pt on falls precautions, no behaviors observed, staff will provide SBA of 1 for ambulation. Pt isolated in room this shift, appropriate with peers and staff. Pt did not completely engage in assessment, mind wandering/impaired concentration. Pt demonstrated ability to communicate needs to to this writer. Pt continues to decline Covid swab. Will continue to monitor behavior and encourage engagement. Declined covid swab.     Patient has been discussed in detail with the  during team meeting.  There has been no updates on patient's discharge.     OBJECTIVE   Patient was seen and evaluated in the day area by himself, this was a face-to-face evaluation.  Patient continues to report feeling \"all right\".  Denied all psychiatric symptoms and was able to contract for safety.  He has been observed today walking down the hallway and he needs assistance as he is unstable on his feet.  No other issues or concerns have been reported.         MEDICATIONS   Medications:  Scheduled Meds:    aspirin  81 mg Oral Daily     benztropine  0.5 mg Oral BID     busPIRone HCl  30 mg Oral BID     cloZAPine  250 mg Oral At Bedtime     donepezil  10 mg Oral At Bedtime     gabapentin  100 mg Oral TID w/meals     levothyroxine  88 mcg Oral Daily     megestrol  20 mg Oral Daily     melatonin  5 mg Oral At Bedtime     memantine  10 mg Oral BID     mirtazapine  15 mg Oral At Bedtime     salmeterol  1 puff Inhalation BID     Continuous Infusions:  PRN Meds:.albuterol, alum & mag hydroxide-simethicone, benzocaine-menthol, hydrOXYzine, nicotine, nystatin, polyethylene glycol, polyethylene " "glycol-propylene glycol PF, senna-docusate    Medication adherence issues: MS Med Adherence Y/N: Yes, Hospitalization  Medication side effects: MEDICATION SIDE EFFECTS: no side effects reported  Benefit: Yes / No: Yes       ROS   A comprehensive review of systems was negative.       MENTAL STATUS EXAM   Vitals: /86   Pulse 80   Temp 98  F (36.7  C)   Resp 16   Ht 1.778 m (5' 10\")   Wt 79 kg (174 lb 2.6 oz)   SpO2 98%   BMI 24.99 kg/m         Appearance:  No apparent distress  Mood: \"Feeling all right\"  Affect: Calm  Suicidal Ideation: Denies  Homicidal Ideation: Denies  Thought process: Disorganized and concrete  Thought content: Remains confused and delusional and hallucinating at times.    Fund of Knowledge: Below average  Attention/Concentration: Poor  Language ability: Significantly impaired  Memory: Global memory impairment  Insight:  Poor.  Judgement: Poor  Orientation: Only to person  Psychomotor Behavior: slowed    Muscle Strength and Tone: MuscleStrength: Normal and Atrophy  Gait and Station: Not evaluated       LABS   personally reviewed.     No results found for: PHENYTOIN, PHENOBARB, VALPROATE, CBMZ       DIAGNOSIS   Principal Problem:    Major neurocognitive disorder, due to multiple etiologies, with behavioral disturbance, severe (H)    Active Problem List:  Patient Active Problem List   Diagnosis     TBI with aggressive behavior     HTN (hypertension)     COPD (chronic obstructive pulmonary disease) (H)     Dementia with behavioral disturbance (H)     Chronic schizophrenia (H)     Smoker     Agitation     Behavior disturbance     Psychosis (H)     Major neurocognitive disorder, due to multiple etiologies, with behavioral disturbance, severe (H)     Paranoid schizophrenia, chronic condition with acute exacerbation (H)     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 Ongoing education given regarding diagnostic and treatment options with risks, benefits and alternatives and adequate verbalization of understanding.  2.  Medications       BuSpar 30 mg 2 times daily       Cogentin 0.5 mg 2 times a day       Clozaril 250 mg at bedtime       Aricept 10 mg at bedtime       Gabapentin 100 mg 3 times a day       Namenda 10 mg 2 times a day       Remeron 15 mg at bedtime       melatonin 5 mg at bedtime  3.  Medical team following the patient   4.   coordinating a safe discharge plan with the patient.  5.  Labs have been ordered, liver functions remained stable consistent with the hep C diagnosis.      Risk Assessment: [facility] RISK ASSESSMENT: Patient able to contract for safety    Coordination of Care:   Treatment Plan reviewed and physician signed, Care discussed with Care/Treatment Team Members, Chart reviewed and Patient seen      Re-Certification I certify that the inpatient psychiatric facility services furnished since the previous certification were, and continue to be, medically necessary for, either, treatment which could reasonably be expected to improve the patient s condition or diagnostic study and that the hospital records indicate that the services furnished were, either, intensive treatment services, admission and related services necessary for diagnostic study, or equivalent services.     I certify that the patient continues to need, on a daily basis, active treatment furnished directly by or requiring the supervision of inpatient psychiatric facility personnel.   I estimate 14 days of hospitalization is necessary for proper treatment of the patient. My plans for post-hospital care for this patient are  TBD     Ginger Dubon MD    -     09/07/2022  -     3:29 PM    Total time 15 minutes with > 50%spent on coordination of cares and psycho-education.    This note was created with help of Dragon dictation  system. Grammatical / typing errors are not intentional.    Ginger Dubon MD

## 2022-09-07 NOTE — PLAN OF CARE
Assessment/Intervention/Current Symptoms and Care Coordination:     -Reviewed pt s chart  -Rounded with team in review of pt's care and plan  -Current information: LSS is working on sending the necessary document for the court to issue guardianship letters.      [place] [facility] assessment will be completed when guardian is known, per .      UPDATE: This patient has been accepted at the following facility: Waiting for letters of guardianship.  [facility]  Jasmin Childs RN, BSN,PHN  [facility]  [address] [place] [address]  Phone:[phone]  Fax:[phone]  [email or web address]   [email or web address]        As of last week, the status was  Received a call from [place] [facility] staff, following a referral that was made by writer. Staff (Carlo - [phone]) reported that they are unable to accept the referral because:  1. He has an MA and has been since 2016  2. Has a managed care through "[employer].  Carlo said she is declining the referral and will communicate to [facility] to take service the patient      Discharge Plan or Goal:     - Received information this morning that the   has ruled on this patient's guardianship: Final steps towards obtaining a letter of guardianship is in motion.  -Awaiting to receive the guardianship order and letter of guardianship  - Patient continues to stabilize at baseline.      Barriers to Discharge:  - Funding  - Receipt of guardianship order is expected.    - Patient is stable at his baseline. He can not care for himself, needs assist with all ADLs. Patient needs safe discharge plan.     Referral Status:  - [facility]  [facility]  - [place] [facility] [place] ([place] will complete the assessment since the  has ruled on guardianship      Legal Status:  Court Hold     Contact:  Joi Roque  [facility] Service  Coordinator  [facility]  Phone: [phone]    Fax: [phone]  Monday -Thursday 6:30 AM-4:00 PM,   Friday 6:30-10:30 AM

## 2022-09-08 PROCEDURE — 250N000013 HC RX MED GY IP 250 OP 250 PS 637: Performed by: PSYCHIATRY & NEUROLOGY

## 2022-09-08 PROCEDURE — 124N000003 HC R&B MH SENIOR/ADOLESCENT

## 2022-09-08 RX ADMIN — GABAPENTIN 100 MG: 100 CAPSULE ORAL at 12:34

## 2022-09-08 RX ADMIN — GABAPENTIN 100 MG: 100 CAPSULE ORAL at 08:18

## 2022-09-08 RX ADMIN — LEVOTHYROXINE SODIUM 88 MCG: 0.09 TABLET ORAL at 08:18

## 2022-09-08 RX ADMIN — MEMANTINE 10 MG: 10 TABLET ORAL at 08:18

## 2022-09-08 RX ADMIN — MEMANTINE 10 MG: 10 TABLET ORAL at 19:46

## 2022-09-08 RX ADMIN — BENZTROPINE MESYLATE 0.5 MG: 0.5 TABLET ORAL at 19:47

## 2022-09-08 RX ADMIN — SALMETEROL XINAFOATE 1 PUFF: 50 POWDER, METERED ORAL; RESPIRATORY (INHALATION) at 08:18

## 2022-09-08 RX ADMIN — BUSPIRONE HYDROCHLORIDE 30 MG: 15 TABLET ORAL at 08:18

## 2022-09-08 RX ADMIN — DONEPEZIL HYDROCHLORIDE 10 MG: 10 TABLET ORAL at 19:47

## 2022-09-08 RX ADMIN — BENZTROPINE MESYLATE 0.5 MG: 0.5 TABLET ORAL at 08:18

## 2022-09-08 RX ADMIN — CLOZAPINE 250 MG: 100 TABLET ORAL at 19:47

## 2022-09-08 RX ADMIN — MEGESTROL ACETATE 20 MG: 20 TABLET ORAL at 08:18

## 2022-09-08 RX ADMIN — SALMETEROL XINAFOATE 1 PUFF: 50 POWDER, METERED ORAL; RESPIRATORY (INHALATION) at 19:46

## 2022-09-08 RX ADMIN — MIRTAZAPINE 15 MG: 15 TABLET, FILM COATED ORAL at 19:46

## 2022-09-08 RX ADMIN — ASPIRIN 81 MG: 81 TABLET, COATED ORAL at 08:18

## 2022-09-08 RX ADMIN — BUSPIRONE HYDROCHLORIDE 30 MG: 15 TABLET ORAL at 19:47

## 2022-09-08 RX ADMIN — GABAPENTIN 100 MG: 100 CAPSULE ORAL at 17:24

## 2022-09-08 RX ADMIN — Medication 5 MG: at 19:47

## 2022-09-08 ASSESSMENT — ACTIVITIES OF DAILY LIVING (ADL)
ADLS_ACUITY_SCORE: 73

## 2022-09-08 NOTE — PLAN OF CARE
"  Problem: Behavioral Health Plan of Care  Goal: Adheres to Safety Considerations for Self and Others  Outcome: Ongoing, Progressing  Intervention: Develop and Maintain Individualized Safety Plan  Recent Flowsheet Documentation  Taken 9/8/2022 1600 by Azael Solorio RN  Safety Measures: environmental rounds completed   Goal Outcome Evaluation:     Plan of Care Reviewed With: patient    Vitals:B/P: 112/82, T: 97.4, P: 88, R: 16    Symptomology: Schizophrenia, Hz of TBI, COPD    MH Summary: patient was friendly and cooperative this shift, he attended 1 group and stayed for the majority and then headed to room, he independently came out for meals and intake at dinner and snack was 60%, he had been snacking since leaving group and stated both times I encouraged him to eat , 'not hungry\" . Patient denies depression, SI/HI and anxiety, tells me I'm ok, got a  cigarette?\"  Patient exhibits mild humor and remains med compliant and retired to bed at 2030, refuse shower. No rude loud cursing outburst this shift after or during assessment questions.                  "

## 2022-09-08 NOTE — PLAN OF CARE
Patient slept a total of 8.5 hours. Patient has not been to the bathroom throughout the night. No behavioral issues noted the whole shift.

## 2022-09-08 NOTE — PLAN OF CARE
Problem: Social, Occupational or Functional Impairment (Psychotic Signs/Symptoms)  Goal: Enhanced Social, Occupational or Functional Skills (Psychotic Signs/Symptoms)  Outcome: Ongoing, Not Progressing  Intervention: Promote Social, Occupational and Functional Ability  Recent Flowsheet Documentation  Taken 9/7/2022 1900 by Ember Guerrero, RN  Trust Relationship/Rapport:   care explained   choices provided   emotional support provided   empathic listening provided   questions encouraged   thoughts/feelings acknowledged   Goal Outcome Evaluation:    Plan of Care Reviewed With: patient    Pt Isolates self came out for meal ate about 25% of his supper and requested to be taken back to his room . Pt was medication complaint . He denies SI/SIB , Anxiety , depression and hallucination . Pt quiet and corporative with cares but refused COVID testing . Will continue POC

## 2022-09-08 NOTE — PLAN OF CARE
Assessment/Intervention/Current Symptoms and Care Coordination:     -Reviewed pt s chart  -Rounded with team in review of pt's care and plan  -Current information: LSS is working on sending the necessary document for the court to issue guardianship letters.      [place] [facility] [place] will be completed when guardian is known, per .      UPDATE: This patient has been accepted at the following facility: Waiting for letters of guardianship.  [facility]  Jasmin Childs RN, BSN,PHN  [facility]  [address] [place] [address]  Phone:[phone]  Fax:[phone]  [email or web address]   [email or web address]        As of last week, the status was  Received a call from [place] [facility] staff, following a referral that was made by writer. Staff (Carlo - [phone]) reported that they are unable to accept the referral because:  1. He has an MA and has been since 2016  2. Has a managed care through [employer].  Carlo said she is declining the referral and will communicate to [facility] to take service the patient      Discharge Plan or Goal:     - Received information this morning that the   has ruled on this patient's guardianship: Final steps towards obtaining a letter of guardianship is in motion.  -Awaiting to receive the guardianship order and letter of guardianship  - Patient continues to stabilize at baseline.      Barriers to Discharge:  - Funding  - Receipt of guardianship order is expected.    - Patient is stable at his baseline. He can not care for himself, needs assist with all ADLs. Patient needs safe discharge plan.     Referral Status:  - [facility]  [facility]  - [place] [facility] [place] ([place] will complete the assessment since the  has ruled on guardianship      Legal Status:  Court Hold     Contact:  Joi Roque  [facility] Service  Coordinator  [facility]  Phone: [phone]    Fax: [phone]  Monday -Thursday 6:30 AM-4:00 PM,   Friday 6:30-10:30 AM

## 2022-09-08 NOTE — PLAN OF CARE
"  Problem: Behavior Regulation Impairment (Psychotic Signs/Symptoms)  Goal: Improved Behavioral Control (Psychotic Signs/Symptoms)  Outcome: Ongoing, Progressing  Flowsheets (Taken 9/8/2022 1145)  Mutually Determined Action Steps (Improved Behavioral Control): verbalizes gratifying activity   Goal Outcome Evaluation:    Plan of Care Reviewed With: patient        Nursing Assessment    Psychosis (H) [F29]    Admit Date: 1/26/2022    Length of Stay: 225    Patient evaluation continues. Assessed mood,anxiety,thoughts and behavior. Patient is  progressing towards goals. Patient is encouraged to participate in groups and assisted to develop healthy coping skills.  Patient admits to  auditory hallucinations. BP (!) 89/60 (BP Location: Right arm, Patient Position: Supine, Cuff Size: Adult Small)   Pulse 82   Temp 97.6  F (36.4  C) (Oral)   Resp 16   Ht 1.778 m (5' 10\")   Wt 79 kg (174 lb 2.6 oz)   SpO2 96%   BMI 24.99 kg/m      Mood: ok as stated by pt    Patient reports depression none and reports anxiety none    Affect:flat blunted    Sleep: good, naps during the day    Appetite: good 75% of meals, comes out to lounge for meals.    SI: denies    HI: denies    SIB: denies      Medication Compliance yes    Group participation:bingo only    ADL's: assisted when pt allows    Fall risk interventions: proper foot wear, orthostatic B/p, standby assist    Rinku Score Interventions:none    Discharge planning I process    Refer to daily team meeting notes for individualized plan of care. Nursing will continue to assess.    *Scale is 1-10 and 10 is the worst.                "

## 2022-09-08 NOTE — PROGRESS NOTES
Patient remains stable and no new behaviors have been reported.  Patient continues to present at his baseline.  There has been no updates on patient's discharge.  There has been no medication changes.    Ginger Dubon MD

## 2022-09-09 LAB
BASOPHILS # BLD AUTO: 0.1 10E3/UL (ref 0–0.2)
BASOPHILS NFR BLD AUTO: 2 %
EOSINOPHIL # BLD AUTO: 1.4 10E3/UL (ref 0–0.7)
EOSINOPHIL NFR BLD AUTO: 24 %
ERYTHROCYTE [DISTWIDTH] IN BLOOD BY AUTOMATED COUNT: 13.9 % (ref 10–15)
HCT VFR BLD AUTO: 42.8 % (ref 40–53)
HGB BLD-MCNC: 15.1 G/DL (ref 13.3–17.7)
IMM GRANULOCYTES # BLD: 0 10E3/UL
IMM GRANULOCYTES NFR BLD: 0 %
LYMPHOCYTES # BLD AUTO: 1.8 10E3/UL (ref 0.8–5.3)
LYMPHOCYTES NFR BLD AUTO: 30 %
MCH RBC QN AUTO: 32.1 PG (ref 26.5–33)
MCHC RBC AUTO-ENTMCNC: 35.3 G/DL (ref 31.5–36.5)
MCV RBC AUTO: 91 FL (ref 78–100)
MONOCYTES # BLD AUTO: 0.5 10E3/UL (ref 0–1.3)
MONOCYTES NFR BLD AUTO: 9 %
NEUTROPHILS # BLD AUTO: 2 10E3/UL (ref 1.6–8.3)
NEUTROPHILS NFR BLD AUTO: 35 %
NRBC # BLD AUTO: 0 10E3/UL
NRBC BLD AUTO-RTO: 0 /100
PLATELET # BLD AUTO: 158 10E3/UL (ref 150–450)
RBC # BLD AUTO: 4.7 10E6/UL (ref 4.4–5.9)
WBC # BLD AUTO: 5.8 10E3/UL (ref 4–11)

## 2022-09-09 PROCEDURE — 250N000013 HC RX MED GY IP 250 OP 250 PS 637: Performed by: PSYCHIATRY & NEUROLOGY

## 2022-09-09 PROCEDURE — 99231 SBSQ HOSP IP/OBS SF/LOW 25: CPT | Performed by: PSYCHIATRY & NEUROLOGY

## 2022-09-09 PROCEDURE — 85025 COMPLETE CBC W/AUTO DIFF WBC: CPT | Performed by: PSYCHIATRY & NEUROLOGY

## 2022-09-09 PROCEDURE — 124N000003 HC R&B MH SENIOR/ADOLESCENT

## 2022-09-09 PROCEDURE — 36415 COLL VENOUS BLD VENIPUNCTURE: CPT | Performed by: PSYCHIATRY & NEUROLOGY

## 2022-09-09 RX ADMIN — MEMANTINE 10 MG: 10 TABLET ORAL at 19:48

## 2022-09-09 RX ADMIN — MEMANTINE 10 MG: 10 TABLET ORAL at 07:49

## 2022-09-09 RX ADMIN — LEVOTHYROXINE SODIUM 88 MCG: 0.09 TABLET ORAL at 07:49

## 2022-09-09 RX ADMIN — SALMETEROL XINAFOATE 1 PUFF: 50 POWDER, METERED ORAL; RESPIRATORY (INHALATION) at 07:49

## 2022-09-09 RX ADMIN — GABAPENTIN 100 MG: 100 CAPSULE ORAL at 17:18

## 2022-09-09 RX ADMIN — BENZTROPINE MESYLATE 0.5 MG: 0.5 TABLET ORAL at 07:49

## 2022-09-09 RX ADMIN — BUSPIRONE HYDROCHLORIDE 30 MG: 15 TABLET ORAL at 19:48

## 2022-09-09 RX ADMIN — GABAPENTIN 100 MG: 100 CAPSULE ORAL at 13:28

## 2022-09-09 RX ADMIN — GABAPENTIN 100 MG: 100 CAPSULE ORAL at 07:49

## 2022-09-09 RX ADMIN — SALMETEROL XINAFOATE 1 PUFF: 50 POWDER, METERED ORAL; RESPIRATORY (INHALATION) at 19:49

## 2022-09-09 RX ADMIN — ASPIRIN 81 MG: 81 TABLET, COATED ORAL at 07:49

## 2022-09-09 RX ADMIN — MEGESTROL ACETATE 20 MG: 20 TABLET ORAL at 07:49

## 2022-09-09 RX ADMIN — CLOZAPINE 250 MG: 100 TABLET ORAL at 19:48

## 2022-09-09 RX ADMIN — BENZTROPINE MESYLATE 0.5 MG: 0.5 TABLET ORAL at 19:48

## 2022-09-09 RX ADMIN — Medication 5 MG: at 19:48

## 2022-09-09 RX ADMIN — BUSPIRONE HYDROCHLORIDE 30 MG: 15 TABLET ORAL at 07:49

## 2022-09-09 RX ADMIN — DONEPEZIL HYDROCHLORIDE 10 MG: 10 TABLET ORAL at 19:48

## 2022-09-09 RX ADMIN — MIRTAZAPINE 15 MG: 15 TABLET, FILM COATED ORAL at 19:48

## 2022-09-09 ASSESSMENT — ACTIVITIES OF DAILY LIVING (ADL)
ADLS_ACUITY_SCORE: 73

## 2022-09-09 NOTE — PLAN OF CARE
09/07/22 1037   Individualization/Patient Specific Goals   Patient Personal Strengths Resilient; Improvements in cooperating with directions    Patient Vulnerabilities lacks insight into illness   Anxieties, Fears or Concerns Patient lacks insight into his illness. He is at baseline with delusions and hallucinations.   Individualized Care Needs Patient is on a commitment/pittman and guardianship case is pending. Continue to encourage food, hygiene, socializing/cominig out to the lounge, taking walks on the unit.   Patient-Specific Goals (Include Timeframe) Continue with plan for stabilization 10-15 days   Interprofessional Rounds   Summary Patient continues to be stable at baseline. Patient has delusions and hallucinations. Patient is waiting for guardianship case to be finalized.   Participants nursing;psychiatrist;CTC;OT   Team Discussion   Participants Dr. Dk MD; FIFI Rivera, Wen Broderick RN; Radha Licona OTR   Progress Making progress.   Anticipated length of stay 10-14 days   Continued Stay Criteria/Rationale Patient can not discharge safely. He needs assistance with all ADLs. Team is waiting on final guardianship ruling. Patient will discharge to a group home/assisted living.   Medical/Physical Please see H and P.   Plan Continue with plan: medication management, psychiatric evaluations, medical evaluations as needed, nursing assessments, milieu therapy, and CTC case management. Order for guardianship has been received. Awaiting letters of guardianship from the court.   Anticipated Discharge Disposition assisted living. Has been accepted into [facility]; awaiting letters of guardianship. Referrals have been made to nursing homes. Nothing can be done in this regard until letter of .      PRECAUTIONS AND SAFETY               Behavioral Orders   Procedures     Code 1 - Restrict to Unit     Code 2       CT scan only     Code 3       Patient can go out of the unit with staff supervision. He  needs to be taken out by him self with 2 staff and security present.     Fall precautions     Routine Programming       As clinically indicated     Status 15       Every 15 minutes.            Safety  Safety WDL: WDL  Patient Location: patient room, own  Observed Behavior: calm  Observed Behavior (Comment): laying in bed  Safety Measures: safety plan reviewed  Diversional Activity: other (see comments) (pt declined to come out of the roonm)  Suicidality: Status 15  Seizure precautions: clutter free environment  Assault: status 15  Elopement Assessment: Distress about legal situation (holds for mental health or criminal)  Elopement Interventions: status 15  Sexual: status 15, private room

## 2022-09-09 NOTE — PLAN OF CARE
"  Problem: Psychomotor Impairment (Psychotic Signs/Symptoms)  Goal: Improved Psychomotor Symptoms (Psychotic Signs/Symptoms)  Intervention: Manage Psychomotor Movement  Recent Flowsheet Documentation  Taken 9/9/2022 1150 by Wen Broderick RN  Patient Performed Hygiene: dressed  Diversional Activity: (music) other (see comments)  Activity (Behavioral Health): up ad holley   Goal Outcome Evaluation:    Plan of Care Reviewed With: patient   Nursing Assessment    Psychosis (H) [F29]    Admit Date: 1/26/2022    Length of Stay: 226    Patient evaluation continues. Assessed mood,anxiety,thoughts and behavior. Patient is  progressing towards goals. Patient is encouraged to participate in groups and assisted to develop healthy coping skills.  Patient denies auditory or visual hallucinations. /85   Pulse 103   Temp 96.8  F (36  C) (Temporal)   Resp 18   Ht 1.778 m (5' 10\")   Wt 79 kg (174 lb 2.6 oz)   SpO2 100%   BMI 24.99 kg/m      Mood: good    Patient reports depression none and reports anxiety none    Affect:flat blunted    Sleep: good, naps during the day    Appetite: good    SI: denies    HI: denies    SIB: denies      Medication Compliance yes    Group participation: no only bingo    ADL's: refuses, washes with wash basin and assist    Fall risk interventions: proper foot wear, orthostatic B/P standby assist    Rinku Score Interventions:none    Discharge planning in process    Refer to daily team meeting notes for individualized plan of care. Nursing will continue to assess.    *Scale is 1-10 and 10 is the worst.                     "

## 2022-09-09 NOTE — PLAN OF CARE
Problem: Sleep Disturbance (Psychotic Signs/Symptoms)  Goal: Improved Sleep (Psychotic Signs/Symptoms)  Outcome: Ongoing, Progressing   Goal Outcome Evaluation:    Patient slept for a total of 10  hours without any interruptions. No agitation and other behavioral  issues noted the whole shift.     Patient continued to refuse Covid test yesterday and needs to be convinced again today.

## 2022-09-09 NOTE — PROGRESS NOTES
"PSYCHIATRY  PROGRESS NOTE     DATE OF SERVICE   09/09/2022       CHIEF COMPLAINT   Patient was admitted due to inability to safely care for himself and psychosis.       SUBJECTIVE   Nursing reports:   patient was friendly and cooperative this shift, he attended 1 group and stayed for the majority and then headed to room, he independently came out for meals and intake at dinner and snack was 60%, he had been snacking since leaving group and stated both times I encouraged him to eat , 'not hungry\" . Patient denies depression, SI/HI and anxiety, tells me I'm ok, got a  cigarette?\"  Patient exhibits mild humor and remains med compliant and retired to bed at 2030, refuse shower. No rude loud cursing outburst this shift after or during assessment questions.       Patient has been discussed in detail with the  during team meeting.  There has been no updates on patient's discharge.     OBJECTIVE   Patient was seen and evaluated at bedside with bedside nurse, this was a face-to-face evaluation.  Patient reported that he is doing okay, he is asking for food and seemed to have a good appetite.  Continues to deny all psychiatric symptoms and is shayan for safety.  There has been no changes in his behaviors and continues to be at his baseline.       MEDICATIONS   Medications:  Scheduled Meds:    aspirin  81 mg Oral Daily     benztropine  0.5 mg Oral BID     busPIRone HCl  30 mg Oral BID     cloZAPine  250 mg Oral At Bedtime     donepezil  10 mg Oral At Bedtime     gabapentin  100 mg Oral TID w/meals     levothyroxine  88 mcg Oral Daily     megestrol  20 mg Oral Daily     melatonin  5 mg Oral At Bedtime     memantine  10 mg Oral BID     mirtazapine  15 mg Oral At Bedtime     salmeterol  1 puff Inhalation BID     Continuous Infusions:  PRN Meds:.albuterol, alum & mag hydroxide-simethicone, hydrOXYzine, nystatin, polyethylene glycol, senna-docusate    Medication adherence issues: MS Med Adherence Y/N: Yes, " "Hospitalization  Medication side effects: MEDICATION SIDE EFFECTS: no side effects reported  Benefit: Yes / No: Yes       ROS   A comprehensive review of systems was negative.       MENTAL STATUS EXAM   Vitals: /85   Pulse 103   Temp 96.8  F (36  C) (Temporal)   Resp 18   Ht 1.778 m (5' 10\")   Wt 79 kg (174 lb 2.6 oz)   SpO2 100%   BMI 24.99 kg/m       Same as last visit  Appearance:  No apparent distress  Mood: \"Feeling all right\"  Affect: Calm  Suicidal Ideation: Denies  Homicidal Ideation: Denies  Thought process: Disorganized and concrete  Thought content: Remains confused and delusional and hallucinating at times.    Fund of Knowledge: Below average  Attention/Concentration: Poor  Language ability: Significantly impaired  Memory: Global memory impairment  Insight:  Poor.  Judgement: Poor  Orientation: Only to person  Psychomotor Behavior: slowed    Muscle Strength and Tone: MuscleStrength: Normal and Atrophy  Gait and Station: Not evaluated       LABS   personally reviewed.     No results found for: PHENYTOIN, PHENOBARB, VALPROATE, CBMZ       DIAGNOSIS   Principal Problem:    Major neurocognitive disorder, due to multiple etiologies, with behavioral disturbance, severe (H)    Active Problem List:  Patient Active Problem List   Diagnosis     TBI with aggressive behavior     HTN (hypertension)     COPD (chronic obstructive pulmonary disease) (H)     Dementia with behavioral disturbance (H)     Chronic schizophrenia (H)     Smoker     Agitation     Behavior disturbance     Psychosis (H)     Major neurocognitive disorder, due to multiple etiologies, with behavioral disturbance, severe (H)     Paranoid schizophrenia, chronic condition with acute exacerbation (H)     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 Ongoing education given " regarding diagnostic and treatment options with risks, benefits and alternatives and adequate verbalization of understanding.  2.  Medications       BuSpar 30 mg 2 times daily       Cogentin 0.5 mg 2 times a day       Clozaril 250 mg at bedtime       Aricept 10 mg at bedtime       Gabapentin 100 mg 3 times a day       Namenda 10 mg 2 times a day       Remeron 15 mg at bedtime       melatonin 5 mg at bedtime  3.  Medical team following the patient   4.   coordinating a safe discharge plan with the patient.  5.  Labs have been ordered, liver functions remained stable consistent with the hep C diagnosis.      Risk Assessment: [facility] RISK ASSESSMENT: Patient able to contract for safety    Coordination of Care:   Treatment Plan reviewed and physician signed, Care discussed with Care/Treatment Team Members, Chart reviewed and Patient seen      Re-Certification I certify that the inpatient psychiatric facility services furnished since the previous certification were, and continue to be, medically necessary for, either, treatment which could reasonably be expected to improve the patient s condition or diagnostic study and that the hospital records indicate that the services furnished were, either, intensive treatment services, admission and related services necessary for diagnostic study, or equivalent services.     I certify that the patient continues to need, on a daily basis, active treatment furnished directly by or requiring the supervision of inpatient psychiatric facility personnel.   I estimate 14 days of hospitalization is necessary for proper treatment of the patient. My plans for post-hospital care for this patient are  TBD     Ginger Dubon MD    -     09/09/2022  -     4:38 PM    Total time 15 minutes with > 50%spent on coordination of cares and psycho-education.    This note was created with help of Dragon dictation system. Grammatical / typing errors are not intentional.    Ginger  MD Ling

## 2022-09-09 NOTE — PLAN OF CARE
Problem: Behavioral Health Plan of Care  Goal: Adheres to Safety Considerations for Self and Others  Outcome: Ongoing, Progressing    Plan of Care Reviewed With: patient       Pt calm, denies SI/SIB, denies mental health issues, affect flat, intermittent eye contact. Pt withdrawn and isolative.  Pt denies any pain, did not eat dinner, ate late lunch around 2 PM, refused to join activity.  Pt medication compliant.

## 2022-09-10 PROCEDURE — 250N000013 HC RX MED GY IP 250 OP 250 PS 637: Performed by: PSYCHIATRY & NEUROLOGY

## 2022-09-10 PROCEDURE — 124N000003 HC R&B MH SENIOR/ADOLESCENT

## 2022-09-10 RX ADMIN — MEMANTINE 10 MG: 10 TABLET ORAL at 08:29

## 2022-09-10 RX ADMIN — GABAPENTIN 100 MG: 100 CAPSULE ORAL at 17:46

## 2022-09-10 RX ADMIN — BUSPIRONE HYDROCHLORIDE 30 MG: 15 TABLET ORAL at 08:29

## 2022-09-10 RX ADMIN — BENZTROPINE MESYLATE 0.5 MG: 0.5 TABLET ORAL at 08:30

## 2022-09-10 RX ADMIN — CLOZAPINE 250 MG: 100 TABLET ORAL at 20:01

## 2022-09-10 RX ADMIN — MIRTAZAPINE 15 MG: 15 TABLET, FILM COATED ORAL at 20:01

## 2022-09-10 RX ADMIN — Medication 5 MG: at 20:02

## 2022-09-10 RX ADMIN — ASPIRIN 81 MG: 81 TABLET, COATED ORAL at 08:30

## 2022-09-10 RX ADMIN — BENZTROPINE MESYLATE 0.5 MG: 0.5 TABLET ORAL at 20:02

## 2022-09-10 RX ADMIN — SALMETEROL XINAFOATE 1 PUFF: 50 POWDER, METERED ORAL; RESPIRATORY (INHALATION) at 08:33

## 2022-09-10 RX ADMIN — BUSPIRONE HYDROCHLORIDE 30 MG: 15 TABLET ORAL at 20:01

## 2022-09-10 RX ADMIN — MEGESTROL ACETATE 20 MG: 20 TABLET ORAL at 08:29

## 2022-09-10 RX ADMIN — GABAPENTIN 100 MG: 100 CAPSULE ORAL at 08:29

## 2022-09-10 RX ADMIN — DONEPEZIL HYDROCHLORIDE 10 MG: 10 TABLET ORAL at 20:02

## 2022-09-10 RX ADMIN — MEMANTINE 10 MG: 10 TABLET ORAL at 20:02

## 2022-09-10 RX ADMIN — SALMETEROL XINAFOATE 1 PUFF: 50 POWDER, METERED ORAL; RESPIRATORY (INHALATION) at 20:03

## 2022-09-10 RX ADMIN — LEVOTHYROXINE SODIUM 88 MCG: 0.09 TABLET ORAL at 08:30

## 2022-09-10 RX ADMIN — GABAPENTIN 100 MG: 100 CAPSULE ORAL at 13:22

## 2022-09-10 ASSESSMENT — ACTIVITIES OF DAILY LIVING (ADL)
ADLS_ACUITY_SCORE: 73

## 2022-09-10 NOTE — PLAN OF CARE
Problem: Decreased Participation and Engagement (Psychotic Signs/Symptoms)  Goal: Increased Participation and Engagement (Psychotic Signs/Symptoms)  Outcome: Ongoing, Not Progressing  Flowsheets (Taken 9/10/2022 1451)  Mutually Determined Action Steps (Increased Participation and Engagement):   verbalizes gratifying activity   verbalizes personal treatment goal     Problem: Mood Impairment (Psychotic Signs/Symptoms)  Goal: Improved Mood Symptoms (Psychotic Signs/Symptoms)  Outcome: Ongoing, Not Progressing     Problem: Behavior Regulation Impairment (Psychotic Signs/Symptoms)  Goal: Improved Behavioral Control (Psychotic Signs/Symptoms)  Outcome: Ongoing, Progressing  Flowsheets (Taken 9/10/2022 1451)  Mutually Determined Action Steps (Improved Behavioral Control):   verbalizes gratifying activity   verbalizes personal treatment goal   Goal Outcome Evaluation:    Patient was not visible in the milieu, withdraw, isolative, calm and cooperative wit all cares. Pt stayed in his room most of the shift, except meal time, staff encouraged pt to use the bath room every 2 hours. Pt appeared in good mood, good personal hygiene, well dressed. Pt denies SI/HI, he only replays yes and no with all assessment questions. Reported adequate sleep, he is compliant with medication, did not report medication side effects. Eat breakfast and lunch 100%, Vs stable denies pain or discomfort, pt did not report any concern or issues at this time. Will continue to monitor.

## 2022-09-11 PROCEDURE — 250N000013 HC RX MED GY IP 250 OP 250 PS 637: Performed by: PSYCHIATRY & NEUROLOGY

## 2022-09-11 PROCEDURE — 124N000003 HC R&B MH SENIOR/ADOLESCENT

## 2022-09-11 RX ADMIN — Medication 5 MG: at 20:35

## 2022-09-11 RX ADMIN — GABAPENTIN 100 MG: 100 CAPSULE ORAL at 13:00

## 2022-09-11 RX ADMIN — BUSPIRONE HYDROCHLORIDE 30 MG: 15 TABLET ORAL at 20:33

## 2022-09-11 RX ADMIN — ASPIRIN 81 MG: 81 TABLET, COATED ORAL at 09:33

## 2022-09-11 RX ADMIN — MEGESTROL ACETATE 20 MG: 20 TABLET ORAL at 09:30

## 2022-09-11 RX ADMIN — BENZTROPINE MESYLATE 0.5 MG: 0.5 TABLET ORAL at 09:33

## 2022-09-11 RX ADMIN — MEMANTINE 10 MG: 10 TABLET ORAL at 20:35

## 2022-09-11 RX ADMIN — GABAPENTIN 100 MG: 100 CAPSULE ORAL at 09:32

## 2022-09-11 RX ADMIN — SALMETEROL XINAFOATE 1 PUFF: 50 POWDER, METERED ORAL; RESPIRATORY (INHALATION) at 20:37

## 2022-09-11 RX ADMIN — CLOZAPINE 250 MG: 100 TABLET ORAL at 20:32

## 2022-09-11 RX ADMIN — MEMANTINE 10 MG: 10 TABLET ORAL at 09:31

## 2022-09-11 RX ADMIN — BUSPIRONE HYDROCHLORIDE 30 MG: 15 TABLET ORAL at 09:34

## 2022-09-11 RX ADMIN — LEVOTHYROXINE SODIUM 88 MCG: 0.09 TABLET ORAL at 09:32

## 2022-09-11 RX ADMIN — MIRTAZAPINE 15 MG: 15 TABLET, FILM COATED ORAL at 20:35

## 2022-09-11 RX ADMIN — DONEPEZIL HYDROCHLORIDE 10 MG: 10 TABLET ORAL at 20:33

## 2022-09-11 RX ADMIN — GABAPENTIN 100 MG: 100 CAPSULE ORAL at 17:52

## 2022-09-11 RX ADMIN — SALMETEROL XINAFOATE 1 PUFF: 50 POWDER, METERED ORAL; RESPIRATORY (INHALATION) at 09:29

## 2022-09-11 RX ADMIN — BENZTROPINE MESYLATE 0.5 MG: 0.5 TABLET ORAL at 20:33

## 2022-09-11 ASSESSMENT — ACTIVITIES OF DAILY LIVING (ADL)
ADLS_ACUITY_SCORE: 73

## 2022-09-11 NOTE — PLAN OF CARE
Problem: Sleep Disturbance  Goal: Adequate Sleep/Rest  Outcome: Ongoing, Progressing   Goal Outcome Evaluation:    Patient slept a total of   10 hours without any interruptions. No behavioral concerns raised the whole night.

## 2022-09-11 NOTE — PLAN OF CARE
"  Problem: Cognitive Impairment (Psychotic Signs/Symptoms)  Goal: Optimal Cognitive Function (Psychotic Signs/Symptoms)  Outcome: Ongoing, Not Progressing  Intervention: Support and Promote Cognitive Ability  Recent Flowsheet Documentation  Taken 9/10/2022 1900 by Clarisa Escamilla RN  Trust Relationship/Rapport:   care explained   choices provided   emotional support provided   empathic listening provided   questions answered   questions encouraged   reassurance provided   thoughts/feelings acknowledged     Problem: Cognitive Impairment (Psychotic Signs/Symptoms)  Goal: Optimal Cognitive Function (Psychotic Signs/Symptoms)  Intervention: Support and Promote Cognitive Ability  Recent Flowsheet Documentation  Taken 9/10/2022 1900 by Clarisa Escamilla RN  Trust Relationship/Rapport:   care explained   choices provided   emotional support provided   empathic listening provided   questions answered   questions encouraged   reassurance provided   thoughts/feelings acknowledged     Problem: Mood Impairment (Psychotic Signs/Symptoms)  Goal: Improved Mood Symptoms (Psychotic Signs/Symptoms)  Outcome: Ongoing, Not Progressing   Goal Outcome Evaluation:    Plan of Care Reviewed With: patient     Pt was not visible in the milieu, did not participate any group activity, when encouraged pt stated \" I am tired, I want to stay in my room\". Pt was able to use the bathroom every 2hr, his gate is steady staff assist the pt stand by whey he is walking.  Pt appeared in good mood, poor personal hygiene, well dressed. Pt denies SI/HI, reported auditory hallucination  \" they are good spirit voices\". Calm and cooperative with all cares. He is compliant with medication, did not report medication side effects. Eat his dinner 100%, Vs stable denies pain or discomfort, pt did not report any concern or issues at this time. Currently he is in his room sleeping with normal respiration.Will continue to monitor.                 "

## 2022-09-11 NOTE — PLAN OF CARE
Goal Outcome Evaluation:    Plan of Care Reviewed With: patient     Flat affect, mood is calm, pt making needs know appropriately. Pt appears responding. Ate 100% of breakfast. Fluids encouraged. Pt encouraged to toilet every 2 hours, pt declines at times.     Pt declined COVID swab    Pt declined shower      Plan:  Continue to encourage fluids and increased activity  Continue to encourage group participation  Continue to encourage choices and independence  Continue to encourage non-pharmacological coping skills

## 2022-09-12 PROCEDURE — 124N000003 HC R&B MH SENIOR/ADOLESCENT

## 2022-09-12 PROCEDURE — 250N000013 HC RX MED GY IP 250 OP 250 PS 637: Performed by: PSYCHIATRY & NEUROLOGY

## 2022-09-12 PROCEDURE — 99231 SBSQ HOSP IP/OBS SF/LOW 25: CPT | Performed by: PSYCHIATRY & NEUROLOGY

## 2022-09-12 RX ADMIN — DONEPEZIL HYDROCHLORIDE 10 MG: 10 TABLET ORAL at 21:01

## 2022-09-12 RX ADMIN — Medication 5 MG: at 21:00

## 2022-09-12 RX ADMIN — GABAPENTIN 100 MG: 100 CAPSULE ORAL at 11:45

## 2022-09-12 RX ADMIN — BENZTROPINE MESYLATE 0.5 MG: 0.5 TABLET ORAL at 08:40

## 2022-09-12 RX ADMIN — BUSPIRONE HYDROCHLORIDE 30 MG: 15 TABLET ORAL at 08:40

## 2022-09-12 RX ADMIN — LEVOTHYROXINE SODIUM 88 MCG: 0.09 TABLET ORAL at 08:40

## 2022-09-12 RX ADMIN — GABAPENTIN 100 MG: 100 CAPSULE ORAL at 17:59

## 2022-09-12 RX ADMIN — ASPIRIN 81 MG: 81 TABLET, COATED ORAL at 08:40

## 2022-09-12 RX ADMIN — SALMETEROL XINAFOATE 1 PUFF: 50 POWDER, METERED ORAL; RESPIRATORY (INHALATION) at 08:44

## 2022-09-12 RX ADMIN — GABAPENTIN 100 MG: 100 CAPSULE ORAL at 08:40

## 2022-09-12 RX ADMIN — MEMANTINE 10 MG: 10 TABLET ORAL at 08:40

## 2022-09-12 RX ADMIN — CLOZAPINE 250 MG: 100 TABLET ORAL at 21:00

## 2022-09-12 RX ADMIN — MEGESTROL ACETATE 20 MG: 20 TABLET ORAL at 08:40

## 2022-09-12 RX ADMIN — BUSPIRONE HYDROCHLORIDE 30 MG: 15 TABLET ORAL at 21:00

## 2022-09-12 RX ADMIN — MIRTAZAPINE 15 MG: 15 TABLET, FILM COATED ORAL at 21:00

## 2022-09-12 RX ADMIN — MEMANTINE 10 MG: 10 TABLET ORAL at 21:00

## 2022-09-12 RX ADMIN — BENZTROPINE MESYLATE 0.5 MG: 0.5 TABLET ORAL at 21:00

## 2022-09-12 ASSESSMENT — ACTIVITIES OF DAILY LIVING (ADL)
ADLS_ACUITY_SCORE: 73

## 2022-09-12 NOTE — PLAN OF CARE
Problem: Sleep Disturbance (Psychotic Signs/Symptoms)  Goal: Improved Sleep (Psychotic Signs/Symptoms)  Outcome: Ongoing, Progressing   Goal Outcome Evaluation:    Patient slept a total of 9 hours. No behavioral issues raised the whole night.

## 2022-09-12 NOTE — PLAN OF CARE
Problem: Mood Impairment (Psychotic Signs/Symptoms)  Goal: Improved Mood Symptoms (Psychotic Signs/Symptoms)  Outcome: Ongoing, Progressing  Flowsheets (Taken 9/12/2022 1232)  Mutually Determined Action Steps (Improved Mood Symptoms): acknowledges progress  Individualized Action Step (Improved Mood Symptoms): Patient will continue to verbalize needs and express concerns.    Note: Patient remained in bed for the entire shift despite numerous encouragement from nursing and other staff. Refused medication X1 but was redirected successfully and medication re-offered and patient accepted. Remains on Q2H toileting, refused Covid testing and refused to go to the dining room.   1700: Patient up to dining room for supper. No participation issues this evening.      Problem: Behavioral Health Plan of Care  Goal: Develops/Participates in Therapeutic [place] to Support Successful Transition  Intervention: Foster Therapeutic [place]  Recent Flowsheet Documentation  Taken 9/12/2022 1000 by Lizzy Linda RN  Trust Relationship/Rapport:    care explained    choices provided    emotional support provided    questions answered    questions encouraged     Problem: Cognitive Impairment (Psychotic Signs/Symptoms)  Goal: Optimal Cognitive Function (Psychotic Signs/Symptoms)  Intervention: Support and Promote Cognitive Ability  Recent Flowsheet Documentation  Taken 9/12/2022 1000 by Lizzy Linda RN  Trust Relationship/Rapport:    care explained    choices provided    emotional support provided    questions answered    questions encouraged     Problem: Social, Occupational or Functional Impairment (Psychotic Signs/Symptoms)  Goal: Enhanced Social, Occupational or Functional Skills (Psychotic Signs/Symptoms)  Intervention: Promote Social, Occupational and Functional Ability  Recent Flowsheet Documentation  Taken 9/12/2022 1000 by Lizzy Linda RN  Trust Relationship/Rapport:    care explained    choices provided    emotional  support provided    questions answered    questions encouraged     Problem: Depression  Goal: Improved Mood  Intervention: Monitor and Manage Depressive Symptoms  Recent Flowsheet Documentation  Taken 9/12/2022 1000 by Lizzy Linda RN  Family/Support System Care: self-care encouraged     Problem: Fall Injury Risk  Goal: Absence of Fall and Fall-Related Injury  Intervention: Identify and Manage Contributors  Recent Flowsheet Documentation  Taken 9/12/2022 1000 by Lizzy Linda RN  Medication Review/Management: medications reviewed  Intervention: Promote Injury-Free Environment  Recent Flowsheet Documentation  Taken 9/12/2022 1000 by Lizzy Linda RN  Safety Promotion/Fall Prevention: nonskid shoes/slippers when out of bed     Problem: Suicide Risk  Goal: Absence of Self-Harm  Intervention: Promote Psychosocial Wellbeing  Recent Flowsheet Documentation  Taken 9/12/2022 1000 by Lizzy Linda RN  Family/Support System Care: self-care encouraged   Goal Outcome Evaluation:

## 2022-09-12 NOTE — PLAN OF CARE
Assessment/Intervention/Current Symptoms and Care Coordination:     -Reviewed pt s chart  -Rounded with team in review of pt's care and plan  -Spoke with [facility] staff ([phone]) and updated staff that this patient is still in the hospital  -Current information: [employer] is working on sending the necessary document for the court to issue guardianship letters.      [place] [facility] assessment will be completed when guardian is known, per .      UPDATE: This patient has been accepted at the following facility: Waiting for letters of guardianship.  [facility]  Jasmin Childs, RN, BSN,PHN  [facility]  [address] [facility] [address] [facility] [address]  Phone:[phone]  Fax:[phone]  [email or web address]   [email or web address]        As of last week, the status was  Received a call from [place] [facility] staff, following a referral that was made by writer. Staff (Carlo - [phone]) reported that they are unable to accept the referral because:  1. He has an MA and has been since 2016  2. Has a managed care through [employer].  Carlo said she is declining the referral and will communicate to [facility] to take service the patient      Discharge Plan or Goal:     - Received information this morning that the   has ruled on this patient's guardianship: Final steps towards obtaining a letter of guardianship is in motion.  -Awaiting to receive the guardianship order and letter of guardianship  - Patient continues to stabilize at baseline.      Barriers to Discharge:  - Funding  - Receipt of guardianship order is expected.    - Patient is stable at his baseline. He can not care for himself, needs assist with all ADLs. Patient needs safe discharge plan.     Referral Status:  - [facility]  [facility]  - [place] (the [place] will complete the assessment since the   has ruled on guardianship      Legal Status:  Court Hold     Contact:  Joi Roque  [facility]   [facility]  Phone: [phone]    Fax: [phone]

## 2022-09-12 NOTE — PLAN OF CARE
"  Problem: Behavioral Health Plan of Care  Goal: Plan of Care Review  Outcome: Ongoing, Progressing  Flowsheets (Taken 9/11/2022 1700)  Plan of Care Reviewed With: patient  Patient Agreement with Plan of Care: unable to participate     Problem: Cognitive Impairment (Psychotic Signs/Symptoms)  Goal: Optimal Cognitive Function (Psychotic Signs/Symptoms)  Outcome: Ongoing, Progressing     Problem: Mood Impairment (Psychotic Signs/Symptoms)  Goal: Improved Mood Symptoms (Psychotic Signs/Symptoms)  Outcome: Ongoing, Progressing   Goal Outcome Evaluation:    Plan of Care Reviewed With: patient      Pt endorsed auditory hallucinations, but denied pain, anxiety, depression, SI, SIB, HI, VH, and contracts for safety. Pt stated that \"my mom is just talking to me\". He refused to elaborate on the content of the message. Pt is isolative in room, but comes out for meals. Pt is pleasant with a flat and blunted effect. He is calm, cooperative and medication compliant. No other concerns noted or verbalized. Will continue to monitor.               "

## 2022-09-12 NOTE — PROGRESS NOTES
"PSYCHIATRY  PROGRESS NOTE     DATE OF SERVICE   09/12/2022       CHIEF COMPLAINT   Patient was admitted due to inability to safely care for himself and psychosis.       SUBJECTIVE   Nursing reports:   Pt endorsed auditory hallucinations, but denied pain, anxiety, depression, SI, SIB, HI, VH, and contracts for safety. Pt stated that \"my mom is just talking to me\". He refused to elaborate on the content of the message. Pt is isolative in room, but comes out for meals. Pt is pleasant with a flat and blunted effect. He is calm, cooperative and medication compliant. No other concerns noted or verbalized. Will continue to monitor.        Patient has been discussed in detail with the  during team meeting.  There has been no updates on patient's discharge.     OBJECTIVE   Patient was seen and evaluated at bedside by himself, this was a face-to-face evaluation.  This morning the patient initially refused to take his medications but with a lot of encouragement he did agree to.  There have been no other issues this afternoon.  Patient remains calm, pleasant and cooperative.  He is denying all psychiatric symptoms and is shayan for safety.  Patient remains delusional and at times hallucinating but seemed to be at baseline.     MEDICATIONS   Medications:  Scheduled Meds:    aspirin  81 mg Oral Daily     benztropine  0.5 mg Oral BID     busPIRone HCl  30 mg Oral BID     cloZAPine  250 mg Oral At Bedtime     donepezil  10 mg Oral At Bedtime     gabapentin  100 mg Oral TID w/meals     levothyroxine  88 mcg Oral Daily     megestrol  20 mg Oral Daily     melatonin  5 mg Oral At Bedtime     memantine  10 mg Oral BID     mirtazapine  15 mg Oral At Bedtime     salmeterol  1 puff Inhalation BID     Continuous Infusions:  PRN Meds:.albuterol, alum & mag hydroxide-simethicone, hydrOXYzine, nystatin, polyethylene glycol, senna-docusate    Medication adherence issues: MS Med Adherence Y/N: Yes, Hospitalization  Medication " "side effects: MEDICATION SIDE EFFECTS: no side effects reported  Benefit: Yes / No: Yes       ROS   A comprehensive review of systems was negative.       MENTAL STATUS EXAM   Vitals: BP 98/76 (Patient Position: Sitting)   Pulse 116   Temp 97  F (36.1  C) (Temporal)   Resp 18   Ht 1.778 m (5' 10\")   Wt 79 kg (174 lb 2.6 oz)   SpO2 99%   BMI 24.99 kg/m       Same as last visit  Appearance:  No apparent distress  Mood: \"Feeling all right\"  Affect: Calm  Suicidal Ideation: Denies  Homicidal Ideation: Denies  Thought process: Disorganized and concrete  Thought content: Remains confused and delusional and hallucinating at times.    Fund of Knowledge: Below average  Attention/Concentration: Poor  Language ability: Significantly impaired  Memory: Global memory impairment  Insight:  Poor.  Judgement: Poor  Orientation: Only to person  Psychomotor Behavior: slowed    Muscle Strength and Tone: MuscleStrength: Normal and Atrophy  Gait and Station: Not evaluated       LABS   personally reviewed.     No results found for: PHENYTOIN, PHENOBARB, VALPROATE, CBMZ       DIAGNOSIS   Principal Problem:    Major neurocognitive disorder, due to multiple etiologies, with behavioral disturbance, severe (H)    Active Problem List:  Patient Active Problem List   Diagnosis     TBI with aggressive behavior     HTN (hypertension)     COPD (chronic obstructive pulmonary disease) (H)     Dementia with behavioral disturbance (H)     Chronic schizophrenia (H)     Smoker     Agitation     Behavior disturbance     Psychosis (H)     Major neurocognitive disorder, due to multiple etiologies, with behavioral disturbance, severe (H)     Paranoid schizophrenia, chronic condition with acute exacerbation (H)     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 Ongoing education given " regarding diagnostic and treatment options with risks, benefits and alternatives and adequate verbalization of understanding.  2.  Medications       BuSpar 30 mg 2 times daily       Cogentin 0.5 mg 2 times a day       Clozaril 250 mg at bedtime       Aricept 10 mg at bedtime       Gabapentin 100 mg 3 times a day       Namenda 10 mg 2 times a day       Remeron 15 mg at bedtime       melatonin 5 mg at bedtime  3.  Medical team following the patient   4.   coordinating a safe discharge plan with the patient.  5.  Labs have been ordered, liver functions remained stable consistent with the hep C diagnosis.      Risk Assessment: [facility] RISK ASSESSMENT: Patient able to contract for safety    Coordination of Care:   Treatment Plan reviewed and physician signed, Care discussed with Care/Treatment Team Members, Chart reviewed and Patient seen      Re-Certification I certify that the inpatient psychiatric facility services furnished since the previous certification were, and continue to be, medically necessary for, either, treatment which could reasonably be expected to improve the patient s condition or diagnostic study and that the hospital records indicate that the services furnished were, either, intensive treatment services, admission and related services necessary for diagnostic study, or equivalent services.     I certify that the patient continues to need, on a daily basis, active treatment furnished directly by or requiring the supervision of inpatient psychiatric facility personnel.   I estimate 14 days of hospitalization is necessary for proper treatment of the patient. My plans for post-hospital care for this patient are  TBD     Ginger Dubon MD    -     09/12/2022  -     3:26 PM    Total time 15 minutes with > 50%spent on coordination of cares and psycho-education.    This note was created with help of Dragon dictation system. Grammatical / typing errors are not intentional.    Ginger  MD Ling

## 2022-09-13 PROCEDURE — 250N000013 HC RX MED GY IP 250 OP 250 PS 637: Performed by: PSYCHIATRY & NEUROLOGY

## 2022-09-13 PROCEDURE — 124N000003 HC R&B MH SENIOR/ADOLESCENT

## 2022-09-13 RX ADMIN — MEGESTROL ACETATE 20 MG: 20 TABLET ORAL at 08:46

## 2022-09-13 RX ADMIN — CLOZAPINE 250 MG: 100 TABLET ORAL at 21:36

## 2022-09-13 RX ADMIN — Medication 5 MG: at 21:34

## 2022-09-13 RX ADMIN — GABAPENTIN 100 MG: 100 CAPSULE ORAL at 11:17

## 2022-09-13 RX ADMIN — GABAPENTIN 100 MG: 100 CAPSULE ORAL at 08:46

## 2022-09-13 RX ADMIN — BENZTROPINE MESYLATE 0.5 MG: 0.5 TABLET ORAL at 08:46

## 2022-09-13 RX ADMIN — BENZTROPINE MESYLATE 0.5 MG: 0.5 TABLET ORAL at 21:37

## 2022-09-13 RX ADMIN — DONEPEZIL HYDROCHLORIDE 10 MG: 10 TABLET ORAL at 21:36

## 2022-09-13 RX ADMIN — LEVOTHYROXINE SODIUM 88 MCG: 0.09 TABLET ORAL at 08:46

## 2022-09-13 RX ADMIN — GABAPENTIN 100 MG: 100 CAPSULE ORAL at 18:09

## 2022-09-13 RX ADMIN — SALMETEROL XINAFOATE 1 PUFF: 50 POWDER, METERED ORAL; RESPIRATORY (INHALATION) at 08:44

## 2022-09-13 RX ADMIN — MIRTAZAPINE 15 MG: 15 TABLET, FILM COATED ORAL at 21:37

## 2022-09-13 RX ADMIN — ASPIRIN 81 MG: 81 TABLET, COATED ORAL at 08:46

## 2022-09-13 RX ADMIN — MEMANTINE 10 MG: 10 TABLET ORAL at 21:42

## 2022-09-13 RX ADMIN — BUSPIRONE HYDROCHLORIDE 30 MG: 15 TABLET ORAL at 08:46

## 2022-09-13 RX ADMIN — BUSPIRONE HYDROCHLORIDE 30 MG: 15 TABLET ORAL at 21:36

## 2022-09-13 RX ADMIN — MEMANTINE 10 MG: 10 TABLET ORAL at 08:46

## 2022-09-13 ASSESSMENT — ACTIVITIES OF DAILY LIVING (ADL)
ADLS_ACUITY_SCORE: 77
ADLS_ACUITY_SCORE: 73
ADLS_ACUITY_SCORE: 77
ADLS_ACUITY_SCORE: 77
ADLS_ACUITY_SCORE: 73
ADLS_ACUITY_SCORE: 73
ADLS_ACUITY_SCORE: 77
ADLS_ACUITY_SCORE: 73
ADLS_ACUITY_SCORE: 73
ADLS_ACUITY_SCORE: 77

## 2022-09-13 NOTE — PLAN OF CARE
Assessment/Intervention/Current Symptoms and Care Coordination:     -Reviewed pt s chart  -Rounded with team in review of pt's care and plan  -Current information: LSS is working on sending the necessary document for the court to issue guardianship letters.      [place] [facility] assessment will be completed when guardian is known, per .      UPDATE: This patient has been accepted at the following facility: Waiting for letters of guardianship.  [facility]  Jasmin Childs RN, BSN,PHN  [facility]  [address] [place] [address]  Phone:[phone]  Fax:[phone]  [email or web address]   [email or web address]        As of last week, the status was  Received a call from [place] [facility] staff, following a referral that was made by writer. Staff (Carlo - [phone]) reported that they are unable to accept the referral because:  1. He has an MA and has been since 2016  2. Has a managed care through [employer].  Carlo said she is declining the referral and will communicate to [facility] to take service the patient      Discharge Plan or Goal:     - Received information this morning that the   has ruled on this patient's guardianship: Final steps towards obtaining a letter of guardianship is in motion.  -Awaiting to receive the guardianship order and letter of guardianship  - Patient continues to stabilize at baseline.      Barriers to Discharge:  - Funding  - Receipt of guardianship order is expected.    - Patient is stable at his baseline. He can not care for himself, needs assist with all ADLs. Patient needs safe discharge plan.     Referral Status:  - [facility]  [facility]  - [place] [facility] [place] (the [place] team will complete the assessment since the  has ruled on guardianship      Legal Status:  Court Hold     Contact:  Joi Roque  [facility] Service  Coordinator  [facility]  Phone: [phone]    Fax: [phone]

## 2022-09-13 NOTE — PLAN OF CARE
Problem: Mobility Impairment  Goal: Optimal Mobility [place] and Safety  Outcome: Ongoing, Progressing  Intervention: Support Mobility [place] and Safety  Flowsheets (Taken 9/13/2022 1429)  Mobility Safety Promotion:    assistive device utilized    close supervision for balance provided    close supervision for safety provided    physical assistance provided    proper body mechanics maintained    rest breaks provided  Note: Patient is alert and responsive, up OOB today with assistance, tolerated ambulation well. Continues to utilize assistive device for ambulation and surface to surface transfers. Being assisted with and offered toileting Q2H and PRN, extra juice is also being provided during DR and group activities. Assistance of X1 person in place including set-up assistance with meals and meal cutting of food preferences.  Patient remains stable and unchanged.      Problem: Fall Injury Risk  Goal: Absence of Fall and Fall-Related Injury  Intervention: Identify and Manage Contributors  Recent Flowsheet Documentation  Taken 9/13/2022 0900 by Lizzy Linda RN  Medication Review/Management: medications reviewed  Intervention: Promote Injury-Free Environment  Recent Flowsheet Documentation  Taken 9/13/2022 0900 by Lizzy Linda RN  Safety Promotion/Fall Prevention: nonskid shoes/slippers when out of bed   Goal Outcome Evaluation:

## 2022-09-14 PROCEDURE — 99231 SBSQ HOSP IP/OBS SF/LOW 25: CPT | Performed by: PSYCHIATRY & NEUROLOGY

## 2022-09-14 PROCEDURE — 250N000013 HC RX MED GY IP 250 OP 250 PS 637: Performed by: PSYCHIATRY & NEUROLOGY

## 2022-09-14 PROCEDURE — 250N000013 HC RX MED GY IP 250 OP 250 PS 637

## 2022-09-14 PROCEDURE — 124N000003 HC R&B MH SENIOR/ADOLESCENT

## 2022-09-14 RX ORDER — ASPIRIN 325 MG
TABLET ORAL
Status: DISCONTINUED
Start: 2022-09-14 | End: 2022-09-14 | Stop reason: WASHOUT

## 2022-09-14 RX ADMIN — MEMANTINE 10 MG: 10 TABLET ORAL at 21:01

## 2022-09-14 RX ADMIN — SALMETEROL XINAFOATE 1 PUFF: 50 POWDER, METERED ORAL; RESPIRATORY (INHALATION) at 09:01

## 2022-09-14 RX ADMIN — MIRTAZAPINE 15 MG: 15 TABLET, FILM COATED ORAL at 21:01

## 2022-09-14 RX ADMIN — GABAPENTIN 100 MG: 100 CAPSULE ORAL at 11:20

## 2022-09-14 RX ADMIN — SALMETEROL XINAFOATE 1 PUFF: 50 POWDER, METERED ORAL; RESPIRATORY (INHALATION) at 21:02

## 2022-09-14 RX ADMIN — BENZTROPINE MESYLATE 0.5 MG: 0.5 TABLET ORAL at 21:01

## 2022-09-14 RX ADMIN — BENZTROPINE MESYLATE 0.5 MG: 0.5 TABLET ORAL at 09:00

## 2022-09-14 RX ADMIN — ASPIRIN 81 MG: 81 TABLET, COATED ORAL at 10:05

## 2022-09-14 RX ADMIN — MEMANTINE 10 MG: 10 TABLET ORAL at 09:00

## 2022-09-14 RX ADMIN — LEVOTHYROXINE SODIUM 88 MCG: 0.09 TABLET ORAL at 10:05

## 2022-09-14 RX ADMIN — MEGESTROL ACETATE 20 MG: 20 TABLET ORAL at 09:00

## 2022-09-14 RX ADMIN — BUSPIRONE HYDROCHLORIDE 30 MG: 15 TABLET ORAL at 09:00

## 2022-09-14 RX ADMIN — BUSPIRONE HYDROCHLORIDE 30 MG: 15 TABLET ORAL at 21:01

## 2022-09-14 RX ADMIN — SALMETEROL XINAFOATE 1 PUFF: 50 POWDER, METERED ORAL; RESPIRATORY (INHALATION) at 21:03

## 2022-09-14 RX ADMIN — Medication 5 MG: at 21:01

## 2022-09-14 RX ADMIN — GABAPENTIN 100 MG: 100 CAPSULE ORAL at 09:00

## 2022-09-14 RX ADMIN — CLOZAPINE 250 MG: 100 TABLET ORAL at 21:01

## 2022-09-14 RX ADMIN — GABAPENTIN 100 MG: 100 CAPSULE ORAL at 17:28

## 2022-09-14 RX ADMIN — DONEPEZIL HYDROCHLORIDE 10 MG: 10 TABLET ORAL at 21:01

## 2022-09-14 ASSESSMENT — ACTIVITIES OF DAILY LIVING (ADL)
ADLS_ACUITY_SCORE: 77

## 2022-09-14 NOTE — PROGRESS NOTES
"PSYCHIATRY  PROGRESS NOTE     DATE OF SERVICE   09/14/2022       CHIEF COMPLAINT   Patient was admitted due to inability to safely care for himself and psychosis.       SUBJECTIVE   Nursing reports:   Patient is alert and responsive, up OOB today with assistance, tolerated ambulation well. Continues to utilize assistive device for ambulation and surface to surface transfers. Being assisted with and offered toileting Q2H and PRN, extra juice is also being provided during DR and group activities. Assistance of X1 person in place including set-up assistance with meals and meal cutting of food preferences.  Patient remains stable and unchanged.      Patient has been discussed in detail with the  during team meeting.   is following up on the tentative discharge to a group home.     OBJECTIVE   Patient was seen and evaluated at bedside by himself, this was a face-to-face evaluation.  Patient reported that he is doing \"all right\".  He denies any other issues or concerns.  Continues to denied having any thoughts of harming himself and has been able to contract for safety.  Remains delusional and grandiose at his baseline.     MEDICATIONS   Medications:  Scheduled Meds:    aspirin  81 mg Oral Daily     benztropine  0.5 mg Oral BID     busPIRone HCl  30 mg Oral BID     cloZAPine  250 mg Oral At Bedtime     donepezil  10 mg Oral At Bedtime     gabapentin  100 mg Oral TID w/meals     levothyroxine  88 mcg Oral Daily     megestrol  20 mg Oral Daily     melatonin  5 mg Oral At Bedtime     memantine  10 mg Oral BID     mirtazapine  15 mg Oral At Bedtime     salmeterol  1 puff Inhalation BID     Continuous Infusions:  PRN Meds:.albuterol, alum & mag hydroxide-simethicone, hydrOXYzine, nystatin, polyethylene glycol, senna-docusate    Medication adherence issues: MS Med Adherence Y/N: Yes, Hospitalization  Medication side effects: MEDICATION SIDE EFFECTS: no side effects reported  Benefit: Yes / No: Yes   " "    ROS   A comprehensive review of systems was negative.       MENTAL STATUS EXAM   Vitals: /81   Pulse 78   Temp 98  F (36.7  C)   Resp 18   Ht 1.778 m (5' 10\")   Wt 79 kg (174 lb 2.6 oz)   SpO2 97%   BMI 24.99 kg/m       Same as last visit  Appearance:  No apparent distress  Mood: \"Feeling all right\"  Affect: Calm  Suicidal Ideation: Denies  Homicidal Ideation: Denies  Thought process: Disorganized and concrete  Thought content: Remains confused and delusional and hallucinating at times.    Fund of Knowledge: Below average  Attention/Concentration: Poor  Language ability: Significantly impaired  Memory: Global memory impairment  Insight:  Poor.  Judgement: Poor  Orientation: Only to person  Psychomotor Behavior: slowed    Muscle Strength and Tone: MuscleStrength: Normal and Atrophy  Gait and Station: Not evaluated       LABS   personally reviewed.     No results found for: PHENYTOIN, PHENOBARB, VALPROATE, CBMZ       DIAGNOSIS   Principal Problem:    Major neurocognitive disorder, due to multiple etiologies, with behavioral disturbance, severe (H)    Active Problem List:  Patient Active Problem List   Diagnosis     TBI with aggressive behavior     HTN (hypertension)     COPD (chronic obstructive pulmonary disease) (H)     Dementia with behavioral disturbance (H)     Chronic schizophrenia (H)     Smoker     Agitation     Behavior disturbance     Psychosis (H)     Major neurocognitive disorder, due to multiple etiologies, with behavioral disturbance, severe (H)     Paranoid schizophrenia, chronic condition with acute exacerbation (H)     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 Ongoing education given regarding diagnostic and treatment options with risks, benefits and alternatives and adequate verbalization of understanding.  2.  " Medications       BuSpar 30 mg 2 times daily       Cogentin 0.5 mg 2 times a day       Clozaril 250 mg at bedtime       Aricept 10 mg at bedtime       Gabapentin 100 mg 3 times a day       Namenda 10 mg 2 times a day       Remeron 15 mg at bedtime       melatonin 5 mg at bedtime  3.  Medical team following the patient   4.   coordinating a safe discharge plan with the patient.  5.  Labs have been ordered, liver functions remained stable consistent with the hep C diagnosis.      Risk Assessment: [facility] RISK ASSESSMENT: Patient able to contract for safety    Coordination of Care:   Treatment Plan reviewed and physician signed, Care discussed with Care/Treatment Team Members, Chart reviewed and Patient seen      Re-Certification I certify that the inpatient psychiatric facility services furnished since the previous certification were, and continue to be, medically necessary for, either, treatment which could reasonably be expected to improve the patient s condition or diagnostic study and that the hospital records indicate that the services furnished were, either, intensive treatment services, admission and related services necessary for diagnostic study, or equivalent services.     I certify that the patient continues to need, on a daily basis, active treatment furnished directly by or requiring the supervision of inpatient psychiatric facility personnel.   I estimate 14 days of hospitalization is necessary for proper treatment of the patient. My plans for post-hospital care for this patient are  TBD     Ginger Dubon MD    -     09/14/2022  -     3:43 PM    Total time 15 minutes with > 50%spent on coordination of cares and psycho-education.    This note was created with help of Dragon dictation system. Grammatical / typing errors are not intentional.    Ginger Dubon MD

## 2022-09-14 NOTE — PLAN OF CARE
Assessment/Intervention/Current Symptoms and Care Coordination:     -Reviewed pt s chart  -Rounded with team in review of pt's care and plan  -Sent information to all team - requesting expedition of [place] [facility] as planned, referral to [facility]/Nursing homes sent           [place] [facility] staff f Staff (Carlo - [phone])        Discharge Plan or Goal:     Pt is currently awaiting discharge to an AFC or nursing home. He has been accepted into a group home but AFC/Nursing home is being pursued.      Barriers to Discharge:  - Funding  -[place] [facility] to be expeditiously done     Referral Status:  - [facility]  [facility]    Legal Status:  Court Hold     Contact:  Joi Roque  [facility] [place] [facility]   [facility]  Phone: [phone]    Fax: [phone]

## 2022-09-14 NOTE — PLAN OF CARE
Problem: Behavioral Health Plan of Care  Goal: Plan of Care Review  Outcome: Ongoing, Progressing  Flowsheets (Taken 9/14/2022 0041)  Plan of Care Reviewed With: patient     Problem: Fall Injury Risk  Goal: Absence of Fall and Fall-Related Injury  Outcome: Ongoing, Progressing  Intervention: Promote Injury-Free Environment  Flowsheets (Taken 9/14/2022 0041)  Safety Promotion/Fall Prevention: clutter free environment maintained     Problem: Sleep Disturbance  Goal: Adequate Sleep/Rest  Outcome: Ongoing, Progressing  Intervention: Promote Sleep/Rest  Flowsheets (Taken 9/14/2022 0041)  Sleep/Rest Enhancement:    noise level reduced    room darkened   Goal Outcome Evaluation:    Plan of Care Reviewed With: patient     Pt refused groups earlier tonight, no bx issues noted. Pt slept 7 hrs. No complaints or concerns voiced.

## 2022-09-14 NOTE — PROGRESS NOTES
Patient was seen and evaluated in the day area by himself, this was a face-to-face evaluation.  Patient continues to be at his baseline and no new behaviors have been reported.  Patient is compliant with his medications, has been eating adequate amounts of food, denies having any thoughts of harming himself and has been shayan for safety.   has continued to follow-up on the guardianship process and at this time we do not have any updates.    Ginger Dubon MD

## 2022-09-14 NOTE — PLAN OF CARE
Problem: Cognitive Impairment (Psychotic Signs/Symptoms)  Goal: Optimal Cognitive Function (Psychotic Signs/Symptoms)  Outcome: Ongoing, Progressing  Flowsheets (Taken 9/14/2022 1532)  Individualized Action Step (Optimal Cognitive Function): Patient will continue to be follow directions and instructions.  Mutually Determined Action Steps (Optimal Cognitive Function): follows step-by-step instructions  Note:   Patient with impaired cognition, able to follow short simple instructions and directions step by step. Up OOB for breakfast this shift, continues to require X1 person minimal to moderate assistance with ADL's and other routine activities. Remains on Q2H and PRN toileting, prefers to take medications with juice. Refused Covid testing again today. No change in condition noted.         Problem: Fall Injury Risk  Goal: Absence of Fall and Fall-Related Injury  Intervention: Identify and Manage Contributors  Recent Flowsheet Documentation  Taken 9/14/2022 1200 by Lizzy Linda RN  Medication Review/Management: medications reviewed  Intervention: Promote Injury-Free Environment  Recent Flowsheet Documentation  Taken 9/14/2022 1200 by Lizzy Linda RN  Safety Promotion/Fall Prevention: clutter free environment maintained   Goal Outcome Evaluation:

## 2022-09-15 PROCEDURE — 250N000013 HC RX MED GY IP 250 OP 250 PS 637: Performed by: PSYCHIATRY & NEUROLOGY

## 2022-09-15 PROCEDURE — 99231 SBSQ HOSP IP/OBS SF/LOW 25: CPT | Performed by: PSYCHIATRY & NEUROLOGY

## 2022-09-15 PROCEDURE — 124N000003 HC R&B MH SENIOR/ADOLESCENT

## 2022-09-15 RX ADMIN — DONEPEZIL HYDROCHLORIDE 10 MG: 10 TABLET ORAL at 20:42

## 2022-09-15 RX ADMIN — BUSPIRONE HYDROCHLORIDE 30 MG: 15 TABLET ORAL at 20:42

## 2022-09-15 RX ADMIN — GABAPENTIN 100 MG: 100 CAPSULE ORAL at 08:45

## 2022-09-15 RX ADMIN — MEMANTINE 10 MG: 10 TABLET ORAL at 20:42

## 2022-09-15 RX ADMIN — ASPIRIN 81 MG: 81 TABLET, COATED ORAL at 08:45

## 2022-09-15 RX ADMIN — MEMANTINE 10 MG: 10 TABLET ORAL at 08:45

## 2022-09-15 RX ADMIN — SALMETEROL XINAFOATE 1 PUFF: 50 POWDER, METERED ORAL; RESPIRATORY (INHALATION) at 08:46

## 2022-09-15 RX ADMIN — CLOZAPINE 250 MG: 100 TABLET ORAL at 20:41

## 2022-09-15 RX ADMIN — SALMETEROL XINAFOATE 1 PUFF: 50 POWDER, METERED ORAL; RESPIRATORY (INHALATION) at 20:42

## 2022-09-15 RX ADMIN — Medication 5 MG: at 20:42

## 2022-09-15 RX ADMIN — GABAPENTIN 100 MG: 100 CAPSULE ORAL at 18:04

## 2022-09-15 RX ADMIN — BENZTROPINE MESYLATE 0.5 MG: 0.5 TABLET ORAL at 20:42

## 2022-09-15 RX ADMIN — BUSPIRONE HYDROCHLORIDE 30 MG: 15 TABLET ORAL at 08:45

## 2022-09-15 RX ADMIN — MIRTAZAPINE 15 MG: 15 TABLET, FILM COATED ORAL at 20:42

## 2022-09-15 RX ADMIN — BENZTROPINE MESYLATE 0.5 MG: 0.5 TABLET ORAL at 08:45

## 2022-09-15 RX ADMIN — GABAPENTIN 100 MG: 100 CAPSULE ORAL at 14:38

## 2022-09-15 RX ADMIN — MEGESTROL ACETATE 20 MG: 20 TABLET ORAL at 08:45

## 2022-09-15 RX ADMIN — LEVOTHYROXINE SODIUM 88 MCG: 0.09 TABLET ORAL at 08:45

## 2022-09-15 ASSESSMENT — ACTIVITIES OF DAILY LIVING (ADL)
ADLS_ACUITY_SCORE: 77

## 2022-09-15 NOTE — PLAN OF CARE
Problem: Behavioral Health Plan of Care  Goal: Plan of Care Review  Outcome: Ongoing, Progressing  Goal: Patient-Specific Goal (Individualization)  Outcome: Ongoing, Progressing  Goal: Adheres to Safety Considerations for Self and Others  Outcome: Ongoing, Progressing  Goal: Absence of New-Onset Illness or Injury  Outcome: Ongoing, Progressing  Goal: Optimized Coping Skills in Response to Life Stressors  Outcome: Ongoing, Progressing  Goal: Develops/Participates in Therapeutic [place] to Support Successful Transition  Outcome: Ongoing, Progressing     Problem: Behavior Regulation Impairment (Psychotic Signs/Symptoms)  Goal: Improved Behavioral Control (Psychotic Signs/Symptoms)  Outcome: Ongoing, Progressing     Problem: Cognitive Impairment (Psychotic Signs/Symptoms)  Goal: Optimal Cognitive Function (Psychotic Signs/Symptoms)  Outcome: Ongoing, Progressing     Problem: Decreased Participation and Engagement (Psychotic Signs/Symptoms)  Goal: Increased Participation and Engagement (Psychotic Signs/Symptoms)  Outcome: Ongoing, Progressing     Problem: Mood Impairment (Psychotic Signs/Symptoms)  Goal: Improved Mood Symptoms (Psychotic Signs/Symptoms)  Outcome: Ongoing, Progressing     Problem: Psychomotor Impairment (Psychotic Signs/Symptoms)  Goal: Improved Psychomotor Symptoms (Psychotic Signs/Symptoms)  Outcome: Ongoing, Progressing     Problem: Sensory Perception Impairment (Psychotic Signs/Symptoms)  Goal: Decreased Sensory Symptoms (Psychotic Signs/Symptoms)  Outcome: Ongoing, Progressing     Problem: Sleep Disturbance (Psychotic Signs/Symptoms)  Goal: Improved Sleep (Psychotic Signs/Symptoms)  Outcome: Ongoing, Progressing     Problem: Social, Occupational or Functional Impairment (Psychotic Signs/Symptoms)  Goal: Enhanced Social, Occupational or Functional Skills (Psychotic Signs/Symptoms)  Outcome: Ongoing, Progressing     Problem: Depression  Goal: Improved Mood  Outcome: Ongoing, Progressing     Problem:  "Suicidal Behavior  Goal: Suicidal Behavior is Absent or Managed  Outcome: Ongoing, Progressing     Problem: Fall Injury Risk  Goal: Absence of Fall and Fall-Related Injury  Outcome: Ongoing, Progressing     Problem: Suicide Risk  Goal: Absence of Self-Harm  Outcome: Ongoing, Progressing     Problem: Sleep Disturbance  Goal: Adequate Sleep/Rest  Outcome: Ongoing, Progressing     Problem: Mobility Impairment  Goal: Optimal Mobility [place] and Safety  Outcome: Ongoing, Progressing   Goal Outcome Evaluation: Patient remains in room withdrawn despite much encouragement to interact with others in dayroom. This nurse offered patient MD ordered COVID 19 test patient refused. Stated \"I don't want my nose bothered with\" . Pt. Educated on importance of being tested pt. Cont. To refuse. Pt. Took meds  well with juice. Call device within reach will cont. POC.                      "

## 2022-09-15 NOTE — PLAN OF CARE
Problem: Behavioral Health Plan of Care  Goal: Adheres to Safety Considerations for Self and Others  Outcome: Ongoing, Progressing   Goal Outcome Evaluation:    Plan of Care Reviewed With: patient   Pt is isolative and withdrawn refused to come out to the milieu to eat for both meals . At lunch pt requested a sandwich form his tray but only ate a quarter of the sandwich and refused to eat anything further patient . denies SI/SIB, hallucination , anxiety or depression . Pt was medication complaint . Pt offer no complained of pain or discomfort .

## 2022-09-15 NOTE — PROGRESS NOTES
"PSYCHIATRY  PROGRESS NOTE     DATE OF SERVICE   09/15/2022       CHIEF COMPLAINT   Patient was admitted due to inability to safely care for himself and psychosis.       SUBJECTIVE   Nursing reports:   Goal Outcome Evaluation: Patient remains in room withdrawn despite much encouragement to interact with others in dayroom. This nurse offered patient MD ordered COVID 19 test patient refused. Stated \"I don't want my nose bothered with\" . Pt. Educated on importance of being tested pt. Cont. To refuse. Pt. Took meds  well with juice. Call device within reach will cont. POC.     Patient has been discussed in detail with the  during team meeting.   is following up on the tentative discharge to a group home.     OBJECTIVE   Patient was seen and evaluated at bedside by himself, this was a face-to-face evaluation.  Patient remains at his baseline and there has been no new updates on his care.  Medications remains the same and the patient continues to be psychotic at times.  He has been calm, pleasant and cooperative for the most part.       MEDICATIONS   Medications:  Scheduled Meds:    aspirin  81 mg Oral Daily     benztropine  0.5 mg Oral BID     busPIRone HCl  30 mg Oral BID     cloZAPine  250 mg Oral At Bedtime     donepezil  10 mg Oral At Bedtime     gabapentin  100 mg Oral TID w/meals     levothyroxine  88 mcg Oral Daily     megestrol  20 mg Oral Daily     melatonin  5 mg Oral At Bedtime     memantine  10 mg Oral BID     mirtazapine  15 mg Oral At Bedtime     salmeterol  1 puff Inhalation BID     Continuous Infusions:  PRN Meds:.albuterol, alum & mag hydroxide-simethicone, hydrOXYzine, nystatin, polyethylene glycol, senna-docusate    Medication adherence issues: MS Med Adherence Y/N: Yes, Hospitalization  Medication side effects: MEDICATION SIDE EFFECTS: no side effects reported  Benefit: Yes / No: Yes       ROS   A comprehensive review of systems was negative.       MENTAL STATUS EXAM   Vitals: " "BP 94/67   Pulse 82   Temp 97  F (36.1  C)   Resp 16   Ht 1.778 m (5' 10\")   Wt 78.5 kg (173 lb 1 oz)   SpO2 97%   BMI 24.83 kg/m       Same as last visit  Appearance:  No apparent distress  Mood: \"Feeling all right\"  Affect: Calm  Suicidal Ideation: Denies  Homicidal Ideation: Denies  Thought process: Disorganized and concrete  Thought content: Remains confused and delusional and hallucinating at times.    Fund of Knowledge: Below average  Attention/Concentration: Poor  Language ability: Significantly impaired  Memory: Global memory impairment  Insight:  Poor.  Judgement: Poor  Orientation: Only to person  Psychomotor Behavior: slowed    Muscle Strength and Tone: MuscleStrength: Normal and Atrophy  Gait and Station: Not evaluated       LABS   personally reviewed.     No results found for: PHENYTOIN, PHENOBARB, VALPROATE, CBMZ       DIAGNOSIS   Principal Problem:    Major neurocognitive disorder, due to multiple etiologies, with behavioral disturbance, severe (H)    Active Problem List:  Patient Active Problem List   Diagnosis     TBI with aggressive behavior     HTN (hypertension)     COPD (chronic obstructive pulmonary disease) (H)     Dementia with behavioral disturbance (H)     Chronic schizophrenia (H)     Smoker     Agitation     Behavior disturbance     Psychosis (H)     Major neurocognitive disorder, due to multiple etiologies, with behavioral disturbance, severe (H)     Paranoid schizophrenia, chronic condition with acute exacerbation (H)     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 Ongoing education given regarding diagnostic and treatment options with risks, benefits and alternatives and adequate verbalization of understanding.  2.  Medications       BuSpar 30 mg 2 times daily       Cogentin 0.5 mg 2 times a day       Clozaril " 250 mg at bedtime       Aricept 10 mg at bedtime       Gabapentin 100 mg 3 times a day       Namenda 10 mg 2 times a day       Remeron 15 mg at bedtime       melatonin 5 mg at bedtime  3.  Medical team following the patient   4.   coordinating a safe discharge plan with the patient.  5.  Labs have been ordered, liver functions remained stable consistent with the hep C diagnosis.      Risk Assessment: [facility] RISK ASSESSMENT: Patient able to contract for safety    Coordination of Care:   Treatment Plan reviewed and physician signed, Care discussed with Care/Treatment Team Members, Chart reviewed and Patient seen      Re-Certification I certify that the inpatient psychiatric facility services furnished since the previous certification were, and continue to be, medically necessary for, either, treatment which could reasonably be expected to improve the patient s condition or diagnostic study and that the hospital records indicate that the services furnished were, either, intensive treatment services, admission and related services necessary for diagnostic study, or equivalent services.     I certify that the patient continues to need, on a daily basis, active treatment furnished directly by or requiring the supervision of inpatient psychiatric facility personnel.   I estimate 14 days of hospitalization is necessary for proper treatment of the patient. My plans for post-hospital care for this patient are  TBD     Ginger Dubon MD    -     09/15/2022  -     4:04 PM    Total time 15 minutes with > 50%spent on coordination of cares and psycho-education.    This note was created with help of Dragon dictation system. Grammatical / typing errors are not intentional.    Ginger Dubon MD

## 2022-09-15 NOTE — PLAN OF CARE
Assessment/Intervention/Current Symptoms and Care Coordination:     -Reviewed pt s chart  -Rounded with team in review of pt's care and plan  -Called and left a message for [facility] guardianship ([phone]). Requested a call back for care coordination.           [place] [facility] staff, f Staff (Carlo - [phone])         Discharge Plan or Goal:     Pt is currently awaiting discharge to an AFC or nursing home. He has been accepted into a group home but AFC/Nursing home is being pursued.      Barriers to Discharge:  - Funding  -[place] [facility] to be expeditiously done     Referral Status:  - [facility]  [facility]     Legal Status:  Court Hold     Contact:  Joi Roque  [facility]   [facility]  Phone: [phone]    Fax: [phone]

## 2022-09-16 PROCEDURE — 250N000013 HC RX MED GY IP 250 OP 250 PS 637: Performed by: PSYCHIATRY & NEUROLOGY

## 2022-09-16 PROCEDURE — 99231 SBSQ HOSP IP/OBS SF/LOW 25: CPT | Performed by: PSYCHIATRY & NEUROLOGY

## 2022-09-16 PROCEDURE — 124N000003 HC R&B MH SENIOR/ADOLESCENT

## 2022-09-16 RX ADMIN — GABAPENTIN 100 MG: 100 CAPSULE ORAL at 17:24

## 2022-09-16 RX ADMIN — SALMETEROL XINAFOATE 1 PUFF: 50 POWDER, METERED ORAL; RESPIRATORY (INHALATION) at 20:35

## 2022-09-16 RX ADMIN — MEMANTINE 10 MG: 10 TABLET ORAL at 08:14

## 2022-09-16 RX ADMIN — BENZTROPINE MESYLATE 0.5 MG: 0.5 TABLET ORAL at 08:15

## 2022-09-16 RX ADMIN — LEVOTHYROXINE SODIUM 88 MCG: 0.09 TABLET ORAL at 08:14

## 2022-09-16 RX ADMIN — BENZTROPINE MESYLATE 0.5 MG: 0.5 TABLET ORAL at 20:35

## 2022-09-16 RX ADMIN — ASPIRIN 81 MG: 81 TABLET, COATED ORAL at 08:15

## 2022-09-16 RX ADMIN — BUSPIRONE HYDROCHLORIDE 30 MG: 15 TABLET ORAL at 20:35

## 2022-09-16 RX ADMIN — BUSPIRONE HYDROCHLORIDE 30 MG: 15 TABLET ORAL at 08:15

## 2022-09-16 RX ADMIN — MEMANTINE 10 MG: 10 TABLET ORAL at 20:34

## 2022-09-16 RX ADMIN — DONEPEZIL HYDROCHLORIDE 10 MG: 10 TABLET ORAL at 20:35

## 2022-09-16 RX ADMIN — CLOZAPINE 250 MG: 100 TABLET ORAL at 20:34

## 2022-09-16 RX ADMIN — MEGESTROL ACETATE 20 MG: 20 TABLET ORAL at 08:14

## 2022-09-16 RX ADMIN — GABAPENTIN 100 MG: 100 CAPSULE ORAL at 14:04

## 2022-09-16 RX ADMIN — MIRTAZAPINE 15 MG: 15 TABLET, FILM COATED ORAL at 20:35

## 2022-09-16 RX ADMIN — Medication 5 MG: at 20:35

## 2022-09-16 RX ADMIN — GABAPENTIN 100 MG: 100 CAPSULE ORAL at 08:15

## 2022-09-16 RX ADMIN — SALMETEROL XINAFOATE 1 PUFF: 50 POWDER, METERED ORAL; RESPIRATORY (INHALATION) at 08:15

## 2022-09-16 ASSESSMENT — ACTIVITIES OF DAILY LIVING (ADL)
ADLS_ACUITY_SCORE: 77

## 2022-09-16 NOTE — PLAN OF CARE
Assessment/Intervention/Current Symptoms and Care Coordination:     -Reviewed pt s chart  -Rounded with team in review of pt's care and plan  -Called [facility] and got the name and emailfor pt's guardian as follows:  Hallie Spaulding. E-mail: [email or web address]  Paperwork has been sent to pt's legal guardian to sign and return.             [place] [facility] staff,  Staff (Carlo - [phone])         Discharge Plan or Goal:     Pt is currently awaiting discharge to an AFC or nursing home. He has been accepted into a group home but AFC/Nursing home is being pursued.      Barriers to Discharge:  - Funding  -[place] [facility] to be expeditiously done     Referral Status:  - [facility]  [facility]     Legal Status:  Court Hold     Contact:  Joi Roque  [facility]   [facility]  Phone: [phone]    Fax: [phone]

## 2022-09-16 NOTE — PLAN OF CARE
Problem: Sleep Disturbance (Psychotic Signs/Symptoms)  Goal: Improved Sleep (Psychotic Signs/Symptoms)  Outcome: Ongoing, Progressing   Goal Outcome Evaluation:        Patient slept a total of 8.25 hours. He got up and voided once. No agitation and other behavioral issues noted the whole night.

## 2022-09-16 NOTE — PLAN OF CARE
Problem: Behavioral Health Plan of Care  Goal: Adheres to Safety Considerations for Self and Others  9/15/2022 3845 by Ember Guerrero, RN  Outcome: Ongoing, Progressing  9/15/2022 1625 by Ember Guerrero, RN  Outcome: Ongoing, Progressing   Goal Outcome Evaluation:    Plan of Care Reviewed With: patient      Pt isolative and withdrawn in his room refused to eat but medication complaint . He denies SI/SIB , depression, hallucination or anxiety . Will continue plan of care

## 2022-09-16 NOTE — PLAN OF CARE
"  Problem: Behavior Regulation Impairment (Psychotic Signs/Symptoms)  Goal: Improved Behavioral Control (Psychotic Signs/Symptoms)  Outcome: Met  Flowsheets (Taken 9/16/2022 1223)  Mutually Determined Action Steps (Improved Behavioral Control): verbalizes gratifying activity   Goal Outcome Evaluation:    Plan of Care Reviewed With: patient             Nursing Assessment    Psychosis (H) [F29]    Admit Date: 1/26/2022    Length of Stay: 233    Patient evaluation continues. Assessed mood,anxiety,thoughts and behavior. Patient is progressing towards goals. Pt is calm and cooperative much of the shift.Patient is encouraged to participate in groups and assisted to develop healthy coping skills.  Patient admits to auditory  hallucinations. BP 99/67   Pulse 89   Temp 98  F (36.7  C) (Oral)   Resp 18   Ht 1.778 m (5' 10\")   Wt 78.5 kg (173 lb 1 oz)   SpO2 96%   BMI 24.83 kg/m      Mood: good    Patient reports depression no and reports anxiety no    Affect:flat blunted    Sleep: good, naps during the day    Appetite: good    SI: denies    HI: denies    SIB: denies      Medication Compliance: yes    Group participation: no except bingo    ADL's: assisted by staff    Fall risk interventions: proper foot wear, orthostatic B/P standby assist    Rinku Score Interventions:none    Discharge planning in process    Refer to daily team meeting notes for individualized plan of care. Nursing will continue to assess.    *Scale is 1-10 and 10 is the worst.           "

## 2022-09-16 NOTE — PROGRESS NOTES
PSYCHIATRY  PROGRESS NOTE     DATE OF SERVICE   09/16/2022       CHIEF COMPLAINT   Patient was admitted due to inability to safely care for himself and psychosis.       SUBJECTIVE   Nursing reports:    Pt isolative and withdrawn in his room refused to eat but medication complaint . He denies SI/SIB , depression, hallucination or anxiety . Will continue plan of care      Patient has been discussed in detail with the  during team meeting.    Assessment/Intervention/Current Symptoms and Care Coordination:     -Reviewed pt s chart  -Rounded with team in review of pt's care and plan  -Called LSS and got the name and emailfor pt's guardian as follows:  Hallie Spaulding. E-mail: [email or web address]  Paperwork has been sent to pt's legal guardian to sign and return.        OBJECTIVE   Patient was seen and evaluated at bedside by himself, this was a face-to-face evaluation.  Patient stated that he is doing good at, is feeling a little bit tired but denies any other issues.  Patient was also asking to be able to go outside and have a cigarette.  Again I discussed with the patient that at this time he is not able to smoke.  He was able to accept this.  Patient continues to present with the same symptoms (some delusions and hallucinations) but he continues to be at his baseline.  Denies any thoughts of harming himself and has been able to contract for safety.     MEDICATIONS   Medications:  Scheduled Meds:    aspirin  81 mg Oral Daily     benztropine  0.5 mg Oral BID     busPIRone HCl  30 mg Oral BID     cloZAPine  250 mg Oral At Bedtime     donepezil  10 mg Oral At Bedtime     gabapentin  100 mg Oral TID w/meals     levothyroxine  88 mcg Oral Daily     megestrol  20 mg Oral Daily     melatonin  5 mg Oral At Bedtime     memantine  10 mg Oral BID     mirtazapine  15 mg Oral At Bedtime     salmeterol  1 puff Inhalation BID     Continuous Infusions:  PRN Meds:.albuterol, alum & mag hydroxide-simethicone,  "hydrOXYzine, nystatin, polyethylene glycol, senna-docusate    Medication adherence issues: MS Med Adherence Y/N: Yes, Hospitalization  Medication side effects: MEDICATION SIDE EFFECTS: no side effects reported  Benefit: Yes / No: Yes       ROS   A comprehensive review of systems was negative.       MENTAL STATUS EXAM   Vitals: BP 99/67   Pulse 89   Temp 98  F (36.7  C) (Oral)   Resp 18   Ht 1.778 m (5' 10\")   Wt 78.5 kg (173 lb 1 oz)   SpO2 96%   BMI 24.83 kg/m       Appearance:  No apparent distress  Mood: \"I am a little tired\"  Affect: Calm  Suicidal Ideation: Denies  Homicidal Ideation: Denies  Thought process: Disorganized and concrete  Thought content: Remains confused and delusional and hallucinating at times.    Fund of Knowledge: Below average  Attention/Concentration: Poor  Language ability: Significantly impaired  Memory: Global memory impairment  Insight:  Poor.  Judgement: Poor  Orientation: Only to person  Psychomotor Behavior: slowed    Muscle Strength and Tone: MuscleStrength: Normal and Atrophy  Gait and Station: Not evaluated       LABS   personally reviewed.     No results found for: PHENYTOIN, PHENOBARB, VALPROATE, CBMZ       DIAGNOSIS   Principal Problem:    Major neurocognitive disorder, due to multiple etiologies, with behavioral disturbance, severe (H)    Active Problem List:  Patient Active Problem List   Diagnosis     TBI with aggressive behavior     HTN (hypertension)     COPD (chronic obstructive pulmonary disease) (H)     Dementia with behavioral disturbance (H)     Chronic schizophrenia (H)     Smoker     Agitation     Behavior disturbance     Psychosis (H)     Major neurocognitive disorder, due to multiple etiologies, with behavioral disturbance, severe (H)     Paranoid schizophrenia, chronic condition with acute exacerbation (H)     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 Ongoing education given regarding diagnostic and treatment options with risks, benefits and alternatives and adequate verbalization of understanding.  2.  Medications       BuSpar 30 mg 2 times daily       Cogentin 0.5 mg 2 times a day       Clozaril 250 mg at bedtime       Aricept 10 mg at bedtime       Gabapentin 100 mg 3 times a day       Namenda 10 mg 2 times a day       Remeron 15 mg at bedtime       melatonin 5 mg at bedtime  3.  Medical team following the patient   4.   coordinating a safe discharge plan with the patient.  5.  Labs have been ordered, liver functions remained stable consistent with the hep C diagnosis.      Risk Assessment: [facility] RISK ASSESSMENT: Patient able to contract for safety    Coordination of Care:   Treatment Plan reviewed and physician signed, Care discussed with Care/Treatment Team Members, Chart reviewed and Patient seen      Re-Certification I certify that the inpatient psychiatric facility services furnished since the previous certification were, and continue to be, medically necessary for, either, treatment which could reasonably be expected to improve the patient s condition or diagnostic study and that the hospital records indicate that the services furnished were, either, intensive treatment services, admission and related services necessary for diagnostic study, or equivalent services.     I certify that the patient continues to need, on a daily basis, active treatment furnished directly by or requiring the supervision of inpatient psychiatric facility personnel.   I estimate 14 days of hospitalization is necessary for proper treatment of the patient. My plans for post-hospital care for this patient are  TBD     Ginger Dubon MD    -     09/16/2022  -     2:04 PM    Total time 15 minutes with > 50%spent on coordination of cares and psycho-education.    This note was created with  help of Dragon dictation system. Grammatical / typing errors are not intentional.    Ginger Dubon MD

## 2022-09-17 LAB
BASOPHILS # BLD AUTO: 0.1 10E3/UL (ref 0–0.2)
BASOPHILS NFR BLD AUTO: 1 %
EOSINOPHIL # BLD AUTO: 1.2 10E3/UL (ref 0–0.7)
EOSINOPHIL NFR BLD AUTO: 22 %
ERYTHROCYTE [DISTWIDTH] IN BLOOD BY AUTOMATED COUNT: 13.5 % (ref 10–15)
HCT VFR BLD AUTO: 44.1 % (ref 40–53)
HGB BLD-MCNC: 15.7 G/DL (ref 13.3–17.7)
HOLD SPECIMEN: NORMAL
IMM GRANULOCYTES # BLD: 0 10E3/UL
IMM GRANULOCYTES NFR BLD: 0 %
LYMPHOCYTES # BLD AUTO: 1.5 10E3/UL (ref 0.8–5.3)
LYMPHOCYTES NFR BLD AUTO: 28 %
MCH RBC QN AUTO: 32.5 PG (ref 26.5–33)
MCHC RBC AUTO-ENTMCNC: 35.6 G/DL (ref 31.5–36.5)
MCV RBC AUTO: 91 FL (ref 78–100)
MONOCYTES # BLD AUTO: 0.6 10E3/UL (ref 0–1.3)
MONOCYTES NFR BLD AUTO: 10 %
NEUTROPHILS # BLD AUTO: 2.1 10E3/UL (ref 1.6–8.3)
NEUTROPHILS NFR BLD AUTO: 39 %
NRBC # BLD AUTO: 0 10E3/UL
NRBC BLD AUTO-RTO: 0 /100
PLATELET # BLD AUTO: 152 10E3/UL (ref 150–450)
RBC # BLD AUTO: 4.83 10E6/UL (ref 4.4–5.9)
WBC # BLD AUTO: 5.5 10E3/UL (ref 4–11)

## 2022-09-17 PROCEDURE — 36415 COLL VENOUS BLD VENIPUNCTURE: CPT | Performed by: PSYCHIATRY & NEUROLOGY

## 2022-09-17 PROCEDURE — 85025 COMPLETE CBC W/AUTO DIFF WBC: CPT | Performed by: PSYCHIATRY & NEUROLOGY

## 2022-09-17 PROCEDURE — 250N000013 HC RX MED GY IP 250 OP 250 PS 637: Performed by: PSYCHIATRY & NEUROLOGY

## 2022-09-17 PROCEDURE — 124N000003 HC R&B MH SENIOR/ADOLESCENT

## 2022-09-17 PROCEDURE — 82550 ASSAY OF CK (CPK): CPT | Performed by: PHYSICIAN ASSISTANT

## 2022-09-17 RX ADMIN — DONEPEZIL HYDROCHLORIDE 10 MG: 10 TABLET ORAL at 20:24

## 2022-09-17 RX ADMIN — BENZTROPINE MESYLATE 0.5 MG: 0.5 TABLET ORAL at 08:25

## 2022-09-17 RX ADMIN — SALMETEROL XINAFOATE 1 PUFF: 50 POWDER, METERED ORAL; RESPIRATORY (INHALATION) at 20:24

## 2022-09-17 RX ADMIN — BUSPIRONE HYDROCHLORIDE 30 MG: 15 TABLET ORAL at 08:25

## 2022-09-17 RX ADMIN — MIRTAZAPINE 15 MG: 15 TABLET, FILM COATED ORAL at 20:23

## 2022-09-17 RX ADMIN — LEVOTHYROXINE SODIUM 88 MCG: 0.09 TABLET ORAL at 08:26

## 2022-09-17 RX ADMIN — MEMANTINE 10 MG: 10 TABLET ORAL at 20:24

## 2022-09-17 RX ADMIN — SALMETEROL XINAFOATE 1 PUFF: 50 POWDER, METERED ORAL; RESPIRATORY (INHALATION) at 08:26

## 2022-09-17 RX ADMIN — ASPIRIN 81 MG: 81 TABLET, COATED ORAL at 08:25

## 2022-09-17 RX ADMIN — GABAPENTIN 100 MG: 100 CAPSULE ORAL at 18:02

## 2022-09-17 RX ADMIN — MEGESTROL ACETATE 20 MG: 20 TABLET ORAL at 08:25

## 2022-09-17 RX ADMIN — BENZTROPINE MESYLATE 0.5 MG: 0.5 TABLET ORAL at 20:23

## 2022-09-17 RX ADMIN — Medication 5 MG: at 20:24

## 2022-09-17 RX ADMIN — GABAPENTIN 100 MG: 100 CAPSULE ORAL at 08:25

## 2022-09-17 RX ADMIN — CLOZAPINE 250 MG: 100 TABLET ORAL at 20:24

## 2022-09-17 RX ADMIN — GABAPENTIN 100 MG: 100 CAPSULE ORAL at 14:12

## 2022-09-17 RX ADMIN — BUSPIRONE HYDROCHLORIDE 30 MG: 15 TABLET ORAL at 20:24

## 2022-09-17 RX ADMIN — MEMANTINE 10 MG: 10 TABLET ORAL at 08:25

## 2022-09-17 ASSESSMENT — ACTIVITIES OF DAILY LIVING (ADL)
ADLS_ACUITY_SCORE: 77

## 2022-09-17 NOTE — PLAN OF CARE
"  Problem: Behavior Regulation Impairment (Psychotic Signs/Symptoms)  Goal: Improved Behavioral Control (Psychotic Signs/Symptoms)  Outcome: Met   Goal Outcome Evaluation:  Nursing Assessment    Psychosis (H) [F29]    Admit Date: 1/26/2022    Length of Stay: 234    Patient evaluation continues. Assessed mood,anxiety,thoughts and behavior. Patient is  progressing towards goals. Patient is encouraged to participate in groups and assisted to develop healthy coping skills.  Patient admits to auditory hallucinations. /79 (Patient Position: Supine)   Pulse 89   Temp 97.6  F (36.4  C) (Temporal)   Resp 16   Ht 1.778 m (5' 10\")   Wt 78.5 kg (173 lb 1 oz)   SpO2 98%   BMI 24.83 kg/m      Mood: good    Patient reports depression none and reports anxiety none    Affect : flat blunted    Sleep: 8 hours last night    Appetite: good    SI: denies    HI: denies    SIB: denies      Medication Compliance: yes    Group participation: no    ADL's: assisted with set up    Fall risk interventions: proper foot wear, orthostatic B/P, standby assist    Rinku Score Interventions:none    Discharge planning in process    Refer to daily team meeting notes for individualized plan of care. Nursing will continue to assess.    *Scale is 1-10 and 10 is the worst.         Plan of Care Reviewed With: patient                 "

## 2022-09-17 NOTE — PLAN OF CARE
Goal Outcome Evaluation:    Plan of Care Reviewed With: patient       Pt isolative to bed. Refused assistance with ambulating to [facility] for dinner. Requested to be transported in wheelchair, but when it was brought he refused that too. Ate dinner in his room. Appetite poor, ate 25% of meal. Observed getting out of bed and going to toilet independently, gait steady. Eye contact poor. Affect flat. Poverty of thought and speech. Calm, otherwise cooperative. Med-compliant. No complaints of pain or side effects. Hygiene poor, refuses assistance with shower. Continue with current treatment plan and recommendations. Continue to monitor and reassess symptoms. Monitor response to medications. Monitor progress towards treatment goals. Encourage groups and participation.

## 2022-09-17 NOTE — PLAN OF CARE
Problem: Fall Injury Risk  Goal: Absence of Fall and Fall-Related Injury  Outcome: Ongoing, Not Progressing   Goal Outcome Evaluation:  Patient had an unwitnessed fall overnight.  Please see notes.    Patient has remained in his room sleeping for majority of the shift.  No further complaints overnight.  No additional episodes of incontinence.  Encouraged to use the restroom q 2hrs however patient is reluctant.  Declined to wear pull ups.  Encouraged to call use his tap bell to call for assistance from staff with ambulating. 7 hrs of sleep.

## 2022-09-17 NOTE — PROGRESS NOTES
Patient had an unwitnessed fall at the 0015.  Patient was overheard yelling very loudly.  Staff rushed to patients room immediately.  Patient was found on the floor.  With assist of 1 staff, patient was assisted off the floor.  Patient appeared to have been rushing to the restroom.  VS WNL. Denied hitting his head.  Patient oriented to person and place. Small Reddened area observed on patients right forearm. No blood noted.  Site cleaned and allow to dry.    Bed linens changed, new scrubs provided.  Patient given a tap bell and advised to ring bell for assistance to and from the restroom. Patient verbalized understanding.  Refused to wear pull ups. Patient appeared to be sleeping during status checks.  Post fall huddle completed with staff involved. Hospitalist notified of patients fall. Will continue to monitor.

## 2022-09-17 NOTE — PLAN OF CARE
Goal Outcome Evaluation:    Plan of Care Reviewed With: patient        Pt isolative to bed. Assisted with ambulating to lounge for dinner. Appetite good. Eye contact intermittent. Affect flat. Poverty of thought and speech. Calm, cooperative. Med-compliant. No complaints of pain or side effects. Hygiene poor, refuses assistance with shower. Continue with current treatment plan and recommendations. Continue to monitor and reassess symptoms. Monitor response to medications. Monitor progress towards treatment goals. Encourage groups and participation.

## 2022-09-18 LAB
ALBUMIN SERPL-MCNC: 3.5 G/DL (ref 3.4–5)
ALP SERPL-CCNC: 87 U/L (ref 40–150)
ALT SERPL W P-5'-P-CCNC: 96 U/L (ref 0–70)
ANION GAP SERPL CALCULATED.3IONS-SCNC: 4 MMOL/L (ref 3–14)
AST SERPL W P-5'-P-CCNC: 134 U/L (ref 0–45)
BILIRUB DIRECT SERPL-MCNC: <0.1 MG/DL (ref 0–0.2)
BILIRUB SERPL-MCNC: 0.3 MG/DL (ref 0.2–1.3)
BUN SERPL-MCNC: 24 MG/DL (ref 7–30)
CALCIUM SERPL-MCNC: 8.8 MG/DL (ref 8.5–10.1)
CHLORIDE BLD-SCNC: 118 MMOL/L (ref 94–109)
CK SERPL-CCNC: 79 U/L (ref 30–300)
CO2 SERPL-SCNC: 23 MMOL/L (ref 20–32)
CREAT SERPL-MCNC: 0.87 MG/DL (ref 0.66–1.25)
CRP SERPL-MCNC: <2.9 MG/L (ref 0–8)
ERYTHROCYTE [DISTWIDTH] IN BLOOD BY AUTOMATED COUNT: 13.9 % (ref 10–15)
GFR SERPL CREATININE-BSD FRML MDRD: >90 ML/MIN/1.73M2
GLUCOSE BLD-MCNC: 104 MG/DL (ref 70–99)
HCT VFR BLD AUTO: 42.4 % (ref 40–53)
HGB BLD-MCNC: 14.8 G/DL (ref 13.3–17.7)
LACTATE SERPL-SCNC: 1.7 MMOL/L (ref 0.7–2)
MAGNESIUM SERPL-MCNC: 1.9 MG/DL (ref 1.6–2.3)
MCH RBC QN AUTO: 32.6 PG (ref 26.5–33)
MCHC RBC AUTO-ENTMCNC: 34.9 G/DL (ref 31.5–36.5)
MCV RBC AUTO: 93 FL (ref 78–100)
PHOSPHATE SERPL-MCNC: 3.6 MG/DL (ref 2.5–4.5)
PLATELET # BLD AUTO: 137 10E3/UL (ref 150–450)
POTASSIUM BLD-SCNC: 3.9 MMOL/L (ref 3.4–5.3)
PROT SERPL-MCNC: 7.1 G/DL (ref 6.8–8.8)
RBC # BLD AUTO: 4.54 10E6/UL (ref 4.4–5.9)
SODIUM SERPL-SCNC: 145 MMOL/L (ref 133–144)
TROPONIN I SERPL HS-MCNC: <3 NG/L
WBC # BLD AUTO: 5.3 10E3/UL (ref 4–11)

## 2022-09-18 PROCEDURE — 124N000003 HC R&B MH SENIOR/ADOLESCENT

## 2022-09-18 PROCEDURE — 250N000013 HC RX MED GY IP 250 OP 250 PS 637: Performed by: PSYCHIATRY & NEUROLOGY

## 2022-09-18 PROCEDURE — 36415 COLL VENOUS BLD VENIPUNCTURE: CPT | Performed by: PHYSICIAN ASSISTANT

## 2022-09-18 PROCEDURE — 85027 COMPLETE CBC AUTOMATED: CPT | Performed by: PHYSICIAN ASSISTANT

## 2022-09-18 PROCEDURE — 86140 C-REACTIVE PROTEIN: CPT | Performed by: PHYSICIAN ASSISTANT

## 2022-09-18 PROCEDURE — 84484 ASSAY OF TROPONIN QUANT: CPT | Performed by: PHYSICIAN ASSISTANT

## 2022-09-18 PROCEDURE — 84425 ASSAY OF VITAMIN B-1: CPT | Performed by: PHYSICIAN ASSISTANT

## 2022-09-18 PROCEDURE — 250N000009 HC RX 250: Performed by: PHYSICIAN ASSISTANT

## 2022-09-18 PROCEDURE — 83735 ASSAY OF MAGNESIUM: CPT | Performed by: PHYSICIAN ASSISTANT

## 2022-09-18 PROCEDURE — 99232 SBSQ HOSP IP/OBS MODERATE 35: CPT | Performed by: PHYSICIAN ASSISTANT

## 2022-09-18 PROCEDURE — 83605 ASSAY OF LACTIC ACID: CPT | Performed by: PHYSICIAN ASSISTANT

## 2022-09-18 PROCEDURE — 80053 COMPREHEN METABOLIC PANEL: CPT | Performed by: PHYSICIAN ASSISTANT

## 2022-09-18 PROCEDURE — 82248 BILIRUBIN DIRECT: CPT | Performed by: PHYSICIAN ASSISTANT

## 2022-09-18 PROCEDURE — 84100 ASSAY OF PHOSPHORUS: CPT | Performed by: PHYSICIAN ASSISTANT

## 2022-09-18 RX ORDER — GINSENG 100 MG
CAPSULE ORAL 2 TIMES DAILY
Status: COMPLETED | OUTPATIENT
Start: 2022-09-18 | End: 2022-09-23

## 2022-09-18 RX ORDER — MULTIVITAMIN,THERAPEUTIC
1 TABLET ORAL DAILY
Status: DISCONTINUED | OUTPATIENT
Start: 2022-09-19 | End: 2022-10-13 | Stop reason: HOSPADM

## 2022-09-18 RX ADMIN — LEVOTHYROXINE SODIUM 88 MCG: 0.09 TABLET ORAL at 08:20

## 2022-09-18 RX ADMIN — BENZTROPINE MESYLATE 0.5 MG: 0.5 TABLET ORAL at 08:19

## 2022-09-18 RX ADMIN — MEMANTINE 10 MG: 10 TABLET ORAL at 20:26

## 2022-09-18 RX ADMIN — MEMANTINE 10 MG: 10 TABLET ORAL at 08:20

## 2022-09-18 RX ADMIN — BENZTROPINE MESYLATE 0.5 MG: 0.5 TABLET ORAL at 20:26

## 2022-09-18 RX ADMIN — MEGESTROL ACETATE 20 MG: 20 TABLET ORAL at 08:20

## 2022-09-18 RX ADMIN — Medication 5 MG: at 20:26

## 2022-09-18 RX ADMIN — MIRTAZAPINE 15 MG: 15 TABLET, FILM COATED ORAL at 20:26

## 2022-09-18 RX ADMIN — SALMETEROL XINAFOATE 1 PUFF: 50 POWDER, METERED ORAL; RESPIRATORY (INHALATION) at 20:26

## 2022-09-18 RX ADMIN — CLOZAPINE 250 MG: 100 TABLET ORAL at 20:26

## 2022-09-18 RX ADMIN — GABAPENTIN 100 MG: 100 CAPSULE ORAL at 12:43

## 2022-09-18 RX ADMIN — BACITRACIN ZINC: 500 OINTMENT TOPICAL at 21:48

## 2022-09-18 RX ADMIN — DONEPEZIL HYDROCHLORIDE 10 MG: 10 TABLET ORAL at 20:26

## 2022-09-18 RX ADMIN — ASPIRIN 81 MG: 81 TABLET, COATED ORAL at 08:21

## 2022-09-18 RX ADMIN — GABAPENTIN 100 MG: 100 CAPSULE ORAL at 17:13

## 2022-09-18 RX ADMIN — GABAPENTIN 100 MG: 100 CAPSULE ORAL at 08:20

## 2022-09-18 RX ADMIN — BUSPIRONE HYDROCHLORIDE 30 MG: 15 TABLET ORAL at 08:20

## 2022-09-18 RX ADMIN — SALMETEROL XINAFOATE 1 PUFF: 50 POWDER, METERED ORAL; RESPIRATORY (INHALATION) at 08:19

## 2022-09-18 RX ADMIN — BUSPIRONE HYDROCHLORIDE 30 MG: 15 TABLET ORAL at 20:26

## 2022-09-18 ASSESSMENT — ACTIVITIES OF DAILY LIVING (ADL)
ADLS_ACUITY_SCORE: 77

## 2022-09-18 NOTE — PLAN OF CARE
"  Problem: Behavior Regulation Impairment (Psychotic Signs/Symptoms)  Goal: Improved Behavioral Control (Psychotic Signs/Symptoms)  Outcome: Met   Goal Outcome Evaluation:    Plan of Care Reviewed With: patient             Nursing Assessment    Psychosis (H) [F29]    Admit Date: 1/26/2022    Length of Stay: 235    Patient evaluation continues. Assessed mood,anxiety,thoughts and behavior. Patient is progressing towards goals. Patient is encouraged to participate in groups and assisted to develop healthy coping skills.  Patient admits to auditory hallucinations. /78 (BP Location: Left arm)   Pulse 106   Temp 98.4  F (36.9  C) (Oral)   Resp 18   Ht 1.778 m (5' 10\")   Wt 79.1 kg (174 lb 6.1 oz)   SpO2 98%   BMI 25.02 kg/m      Mood: good    Patient reports depression none and reports anxiety none    Affect:flat blunted    Sleep: good, naps during the day    Appetite: good 75%    SI: denies    HI: denies    SIB: denies      Medication Compliance yes    Group participation:no    ADL's: assist    Fall risk interventions: proper foot wear, orthostatic B/P, standby assist    Rinku Score Interventions: none    Discharge planning in process    Refer to daily team meeting notes for individualized plan of care. Nursing will continue to assess.    *Scale is 1-10 and 10 is the worst.           "

## 2022-09-18 NOTE — PLAN OF CARE
Problem: Sleep Disturbance  Goal: Adequate Sleep/Rest  Outcome: Ongoing, Progressing   Goal Outcome Evaluation:  Patient slept through the night with 12.5 hrs of sleep.  No episodes of incontinence.  No concerns reported.

## 2022-09-19 LAB
ALBUMIN UR-MCNC: 30 MG/DL
APPEARANCE UR: CLEAR
BILIRUB UR QL STRIP: NEGATIVE
COLOR UR AUTO: YELLOW
GLUCOSE UR STRIP-MCNC: NEGATIVE MG/DL
HGB UR QL STRIP: NEGATIVE
HYALINE CASTS: 1 /LPF
KETONES UR STRIP-MCNC: NEGATIVE MG/DL
LEUKOCYTE ESTERASE UR QL STRIP: NEGATIVE
MUCOUS THREADS #/AREA URNS LPF: PRESENT /LPF
NITRATE UR QL: NEGATIVE
PH UR STRIP: 6 [PH] (ref 5–7)
RBC URINE: 0 /HPF
SARS-COV-2 RNA RESP QL NAA+PROBE: NEGATIVE
SP GR UR STRIP: 1.03 (ref 1–1.03)
UROBILINOGEN UR STRIP-MCNC: NORMAL MG/DL
WBC URINE: 1 /HPF

## 2022-09-19 PROCEDURE — 250N000013 HC RX MED GY IP 250 OP 250 PS 637: Performed by: PSYCHIATRY & NEUROLOGY

## 2022-09-19 PROCEDURE — 250N000013 HC RX MED GY IP 250 OP 250 PS 637: Performed by: PHYSICIAN ASSISTANT

## 2022-09-19 PROCEDURE — 99231 SBSQ HOSP IP/OBS SF/LOW 25: CPT | Performed by: PSYCHIATRY & NEUROLOGY

## 2022-09-19 PROCEDURE — 124N000003 HC R&B MH SENIOR/ADOLESCENT

## 2022-09-19 PROCEDURE — 81003 URINALYSIS AUTO W/O SCOPE: CPT | Performed by: PHYSICIAN ASSISTANT

## 2022-09-19 PROCEDURE — U0003 INFECTIOUS AGENT DETECTION BY NUCLEIC ACID (DNA OR RNA); SEVERE ACUTE RESPIRATORY SYNDROME CORONAVIRUS 2 (SARS-COV-2) (CORONAVIRUS DISEASE [COVID-19]), AMPLIFIED PROBE TECHNIQUE, MAKING USE OF HIGH THROUGHPUT TECHNOLOGIES AS DESCRIBED BY CMS-2020-01-R: HCPCS | Performed by: PSYCHIATRY & NEUROLOGY

## 2022-09-19 RX ADMIN — MEGESTROL ACETATE 20 MG: 20 TABLET ORAL at 08:28

## 2022-09-19 RX ADMIN — GABAPENTIN 100 MG: 100 CAPSULE ORAL at 12:26

## 2022-09-19 RX ADMIN — SALMETEROL XINAFOATE 1 PUFF: 50 POWDER, METERED ORAL; RESPIRATORY (INHALATION) at 20:44

## 2022-09-19 RX ADMIN — BACITRACIN ZINC: 500 OINTMENT TOPICAL at 22:44

## 2022-09-19 RX ADMIN — MEMANTINE 10 MG: 10 TABLET ORAL at 20:42

## 2022-09-19 RX ADMIN — THIAMINE HCL TAB 100 MG 100 MG: 100 TAB at 08:28

## 2022-09-19 RX ADMIN — GABAPENTIN 100 MG: 100 CAPSULE ORAL at 17:35

## 2022-09-19 RX ADMIN — Medication 5 MG: at 20:43

## 2022-09-19 RX ADMIN — BENZTROPINE MESYLATE 0.5 MG: 0.5 TABLET ORAL at 08:28

## 2022-09-19 RX ADMIN — CLOZAPINE 250 MG: 100 TABLET ORAL at 20:42

## 2022-09-19 RX ADMIN — BUSPIRONE HYDROCHLORIDE 30 MG: 15 TABLET ORAL at 20:42

## 2022-09-19 RX ADMIN — GABAPENTIN 100 MG: 100 CAPSULE ORAL at 08:28

## 2022-09-19 RX ADMIN — BACITRACIN ZINC: 500 OINTMENT TOPICAL at 08:29

## 2022-09-19 RX ADMIN — LEVOTHYROXINE SODIUM 88 MCG: 0.09 TABLET ORAL at 08:28

## 2022-09-19 RX ADMIN — ASPIRIN 81 MG: 81 TABLET, COATED ORAL at 08:28

## 2022-09-19 RX ADMIN — DONEPEZIL HYDROCHLORIDE 10 MG: 10 TABLET ORAL at 20:42

## 2022-09-19 RX ADMIN — SALMETEROL XINAFOATE 1 PUFF: 50 POWDER, METERED ORAL; RESPIRATORY (INHALATION) at 08:29

## 2022-09-19 RX ADMIN — MEMANTINE 10 MG: 10 TABLET ORAL at 08:28

## 2022-09-19 RX ADMIN — BENZTROPINE MESYLATE 0.5 MG: 0.5 TABLET ORAL at 20:42

## 2022-09-19 RX ADMIN — MIRTAZAPINE 15 MG: 15 TABLET, FILM COATED ORAL at 20:43

## 2022-09-19 RX ADMIN — BUSPIRONE HYDROCHLORIDE 30 MG: 15 TABLET ORAL at 08:28

## 2022-09-19 RX ADMIN — THERA TABS 1 TABLET: TAB at 08:28

## 2022-09-19 ASSESSMENT — ACTIVITIES OF DAILY LIVING (ADL)
ADLS_ACUITY_SCORE: 77

## 2022-09-19 NOTE — PLAN OF CARE
"Goal Outcome Evaluation:    Plan of Care Reviewed With: patient        Pt isolative to bed. Assisted in coming to lounge for dinner in wheelchair, at his request. Appetite good. Eye contact intermittent. Affect flat. Poverty of thought and speech. Calm, cooperative. Med-compliant. No complaints of pain or side effects. Hygiene poor, refuses assistance with shower. Continue with current treatment plan and recommendations. Continue to monitor and reassess symptoms. Monitor response to medications. Monitor progress towards treatment goals. Encourage groups and participation.     ADDENDUM 2050  Pt observed by staff on floor climbing into bed. Pt reported having fallen after using toilet. Stated he hit his head on the door frame. Laceration on L forehead observed, approx 1\" long. Pt reported dizziness when he stood up from toilet. Assisted back to bed. Vitals assessed (pt refused standing BP). Neuro check WDL. Bacitracin and steri strip applied. IM notified. On-call psychiatrist notified. ANS notified. On-call ordered SIO until CT complete and/or head injury can be ruled out by IM. Pt's fluid intake remains poor despite regular prompts to drink. Tap bell at bedside which pt agrees verbally to use but he does not do so. Consent from Anahi Alexandre at [facility] received for head CT but pt refuses.         "

## 2022-09-19 NOTE — PROGRESS NOTES
Neuro checks completed at 0000, and 0400 and WNL. No neurological or motor deficits observed. Spontaneous arousal, patient responded to his name when called.  Patient oriented to place, situation, and self. Asked to squeeze my hands and he was able to.  Denies feeling dizzy.  Declined orthostatic BP. Up to the restroom to urinate several times overnight.  Gait has been steady. Requested and received a snack at 0415.  Remains on SIO of 10ft due to high risk for falls and SIB.  UA collected and sent to lab. Results pending.  7.5 hrs of sleep. Will continue to monitor

## 2022-09-19 NOTE — PLAN OF CARE
"  Problem: Fall Injury Risk  Goal: Absence of Fall and Fall-Related Injury  Intervention: Promote Injury-Free Environment  Recent Flowsheet Documentation  Taken 9/19/2022 0960 by Wen Broderick, RN  Safety Promotion/Fall Prevention:    check orthostatic blood pressureNursing Assessment    Psychosis (H) [F29]    Admit Date: 1/26/2022    Length of Stay: 236    Patient evaluation continues. Assessed mood,anxiety,thoughts and behavior. Patient continues to be unsteady on his feet at times. Pt does not ask for assist. Pt is steady during the day and is assisted to and from lounge by wheel chair or standby assist. Pt is progressing towards goals mentally, no out bursts and no behavior issues. Will continue to monitor and document progress as far as gait. Pt has fall risk interventions in place. Patient is encouraged to participate in groups and assisted to develop healthy coping skills.  Patient admits to auditory or hallucinations and  at times is overheard talking and/or   /83 (Patient Position: Supine)   Pulse 94   Temp 98  F (36.7  C) (Temporal)   Resp 16   Ht 1.778 m (5' 10\")   Wt 79.1 kg (174 lb 6.1 oz)   SpO2 99%   BMI 25.02 kg/m      Mood: good    Patient reports depression none and reports anxiety none     Affect:flat, blunted    Sleep: good, naps during the day    Appetite: good, wants a  jucy ryder from [employer]    SI: denies    HI: denies    SIB: denies      Medication Compliance yes    Group participation: no    ADL's: assist as pt allows    Fall risk interventions: proper foot wear, orthostatic B/P, standby assist 1:1 staff at this time    Rinku Score Interventions:none    Discharge planning in process    Refer to daily team meeting notes for individualized plan of care. Nursing will continue to assess.    *Scale is 1-10 and 10 is the worst.           clutter free environment maintained    increase visualization of patient    nonskid shoes/slippers when out of bed    patient and family education    " safety round/check completed    treat reversible contributory factors   Goal Outcome Evaluation:    Plan of Care Reviewed With: patient

## 2022-09-19 NOTE — PROGRESS NOTES
"Brief Medicine Follow Up Note    Following up regarding RN page regarding patient fall, unwitnessed and had a recent fall 2 nights ago as well. Patient noted to have new ~1\" laceration on left forehead.  Per staff, patient had turned the lights out and was found on the floor getting himself up to bed from the bathroom.  Patient has a guardian through [facility] [facility].     Patient states he did not pass out and is otherwise not sure why he fell but endorses dizziness. He has some slight pain at the site of the laceration and denies pain otherwise. He does not endorses LOC, neck pain or injuries.     His vitals have been stable.  Unfortunately he refused to stand to have orthostatic vitals checked.  In general, his team notes he is functionally declining and becoming more withdrawn and lethargic.     Denies numbness, tingling, pins and needles, LOC, chest pain, sob, palpitations, headaches, vision changes, abdominal pain, rashes, lumps, lesions, urinary complaints (including dysuria), changes to bowels, bleeding (including epistaxis, bleeding gums, hematuria, melena, hematochezia) or other complaints.     Today's vital signs, medications, and nursing notes were reviewed. Labs reviewed most recent serum and urine laboratories.     /84   Pulse 99   Temp 99  F (37.2  C) (Oral)   Resp 18   Ht 1.778 m (5' 10\")   Wt 79.1 kg (174 lb 6.1 oz)   SpO2 98%   BMI 25.02 kg/m    GENERAL: Alert and oriented x 1 (gives nonsensical date and believes he is in a different facility). NAD. Ambulatory without assistance and able to get up from laying on right side independently.  HEENT: superficial nondraining ~1\" laceration with skin still approximated over the left forehead ~1\" superior to lateral left eyebrow with trace dried blood, no palpable hematoma or deformity. Anicteric noninjected sclera. No nuchal rigidity. Mucous membranes dry. NC. AT. EOMI. PERRLA  CV: + systolic murmur. RRR. S1, S2  RESPIRATORY: Effort " normal on RA. Lungs CTAB with no wheezing, rales, rhonchi.   GI: Abdomen soft, NT, NABS  NEUROLOGICAL: Unable to fully assess pronator drift or finger-nose-finger due to patient not participating. No nystagmus. No focal deficits. Moves all extremities.  CN 2-12 grossly intact.  EXTREMITIES: No peripheral edema. Intact bilateral pedal pulses. CSMs intact.   SKIN: No jaundice. No rashes on exposed skin.  BACK: no CVA tenderness, no spinous tenderness      A/P:  Unwitnessed fall with head injury  Multiple falls   Unclear whether syncopal though is less likely as patient fell in the dark (he turned the lights off).  Patient has known porcine valve replacement, multiple psychiatric medications, physical deconditioning, confusion, reported hx of stroke, hx EtOH that are all potentially contributory. Most pertinent may be that patient was in the dark and unable to see (shut off the lights).  Given exam findings, an acute central process is less likely. Recent CBC normal.  TSH normal.   - agree with SIO  - orthostatic VS as able  - encourage oral fluids, can consider IV fluids if bp down-trending, poor po, or worsening renal function   - STAT CT head, attempted to contact guardian (unsuccessfully as below)  - STAT labs: CBC, LFT, BMP, lactate, CRP, Mg, Phos, CK, thiamine  - neuro checks overnight and a.m.  - UA with reflex  - bacitracin to head lac (does not appear to need sutures at this time)  - please have low threshold to call code stroke if any neuro defits  - added thiamine and multivitamin to regiment     Guardianship in place  Patient intermittently refuses cares and is confused at baseline and unable to full advocate for himself  - called [facility] :   - [phone] and left  with the unit phone number   - [phone] and was again routed to leave a  and again left the  unit number   - [phone] and this again routes me to the same [facility]     Medicine will follow up on head CT, nursing  notes and above labs, please call with questions or concerns    MADHAVI Elias [facility]  Contact information available via [facility] Paging/Directory

## 2022-09-19 NOTE — PLAN OF CARE
Assessment/Intervention/Current Symptoms and Care Coordination:     -Reviewed pt s chart  -Rounded with team in review of pt's care and plan  -Called LSS and got the name and emailfor pt's guardian as follows:  Hallie Spaulding. E-mail: [email or web address]  Paperwork has been sent to pt's legal guardian to sign and return; none has been received. Writer has made several calls and had sent an email; none has been returned. Writer received a voicemail today and returned the call but did not get a response               [facility] staff,  Staff ([place] - [phone])         Discharge Plan or Goal:     Pt is currently awaiting discharge to an AFC or nursing home. He has been accepted into a group home but AFC/Nursing home is being pursued.      Barriers to Discharge:  - Funding  -[facility] to be expeditiously done     Referral Status:  - [facility]     Legal Status:  Court Hold     Contact:  Joi Roque  [facility]   [facility]  Phone: [phone]    Fax: [phone]

## 2022-09-20 PROCEDURE — 250N000013 HC RX MED GY IP 250 OP 250 PS 637: Performed by: PSYCHIATRY & NEUROLOGY

## 2022-09-20 PROCEDURE — 99232 SBSQ HOSP IP/OBS MODERATE 35: CPT | Performed by: PSYCHIATRY & NEUROLOGY

## 2022-09-20 PROCEDURE — 250N000013 HC RX MED GY IP 250 OP 250 PS 637: Performed by: PHYSICIAN ASSISTANT

## 2022-09-20 PROCEDURE — 124N000003 HC R&B MH SENIOR/ADOLESCENT

## 2022-09-20 RX ORDER — CLOZAPINE 100 MG/1
250 TABLET ORAL AT BEDTIME
Qty: 75 TABLET | Refills: 0 | Status: SHIPPED | OUTPATIENT
Start: 2022-09-20 | End: 2022-10-12

## 2022-09-20 RX ORDER — MEGESTROL ACETATE 20 MG/1
20 TABLET ORAL DAILY
Qty: 30 TABLET | Refills: 0 | Status: SHIPPED | OUTPATIENT
Start: 2022-09-20

## 2022-09-20 RX ORDER — GABAPENTIN 100 MG/1
100 CAPSULE ORAL
Qty: 90 CAPSULE | Refills: 0 | Status: SHIPPED | OUTPATIENT
Start: 2022-09-20

## 2022-09-20 RX ORDER — ALBUTEROL SULFATE 90 UG/1
1-2 AEROSOL, METERED RESPIRATORY (INHALATION) EVERY 4 HOURS PRN
Qty: 18 G | Refills: 0 | Status: SHIPPED | OUTPATIENT
Start: 2022-09-20

## 2022-09-20 RX ORDER — LANOLIN ALCOHOL/MO/W.PET/CERES
100 CREAM (GRAM) TOPICAL DAILY
Qty: 30 TABLET | Refills: 0 | Status: SHIPPED | OUTPATIENT
Start: 2022-09-21

## 2022-09-20 RX ORDER — MEMANTINE HYDROCHLORIDE 10 MG/1
10 TABLET ORAL 2 TIMES DAILY
Qty: 60 TABLET | Refills: 0 | Status: SHIPPED | OUTPATIENT
Start: 2022-09-20

## 2022-09-20 RX ORDER — LEVOTHYROXINE SODIUM 88 UG/1
88 TABLET ORAL DAILY
Qty: 30 TABLET | Refills: 0 | Status: SHIPPED | OUTPATIENT
Start: 2022-09-21

## 2022-09-20 RX ORDER — POLYETHYLENE GLYCOL 3350 17 G/17G
17 POWDER, FOR SOLUTION ORAL 2 TIMES DAILY PRN
Qty: 60 PACKET | Refills: 0 | Status: SHIPPED | OUTPATIENT
Start: 2022-09-20

## 2022-09-20 RX ORDER — BENZTROPINE MESYLATE 0.5 MG/1
0.5 TABLET ORAL 2 TIMES DAILY
Qty: 60 TABLET | Refills: 0 | Status: SHIPPED | OUTPATIENT
Start: 2022-09-20

## 2022-09-20 RX ORDER — BUSPIRONE HYDROCHLORIDE 30 MG/1
30 TABLET ORAL 2 TIMES DAILY
Qty: 60 TABLET | Refills: 0 | Status: SHIPPED | OUTPATIENT
Start: 2022-09-20

## 2022-09-20 RX ORDER — DONEPEZIL HYDROCHLORIDE 10 MG/1
10 TABLET, FILM COATED ORAL AT BEDTIME
Qty: 30 TABLET | Refills: 0 | Status: SHIPPED | OUTPATIENT
Start: 2022-09-20

## 2022-09-20 RX ORDER — MULTIVITAMIN,THERAPEUTIC
1 TABLET ORAL DAILY
Qty: 30 TABLET | Refills: 0 | Status: SHIPPED | OUTPATIENT
Start: 2022-09-21

## 2022-09-20 RX ORDER — MIRTAZAPINE 15 MG/1
15 TABLET, FILM COATED ORAL AT BEDTIME
Qty: 30 TABLET | Refills: 0 | Status: SHIPPED | OUTPATIENT
Start: 2022-09-20

## 2022-09-20 RX ADMIN — MEMANTINE 10 MG: 10 TABLET ORAL at 20:17

## 2022-09-20 RX ADMIN — THERA TABS 1 TABLET: TAB at 08:06

## 2022-09-20 RX ADMIN — BUSPIRONE HYDROCHLORIDE 30 MG: 15 TABLET ORAL at 20:17

## 2022-09-20 RX ADMIN — CLOZAPINE 250 MG: 100 TABLET ORAL at 20:17

## 2022-09-20 RX ADMIN — GABAPENTIN 100 MG: 100 CAPSULE ORAL at 17:10

## 2022-09-20 RX ADMIN — BACITRACIN ZINC: 500 OINTMENT TOPICAL at 20:19

## 2022-09-20 RX ADMIN — SALMETEROL XINAFOATE 1 PUFF: 50 POWDER, METERED ORAL; RESPIRATORY (INHALATION) at 20:17

## 2022-09-20 RX ADMIN — GABAPENTIN 100 MG: 100 CAPSULE ORAL at 11:55

## 2022-09-20 RX ADMIN — BACITRACIN ZINC: 500 OINTMENT TOPICAL at 08:07

## 2022-09-20 RX ADMIN — BENZTROPINE MESYLATE 0.5 MG: 0.5 TABLET ORAL at 20:17

## 2022-09-20 RX ADMIN — SALMETEROL XINAFOATE 1 PUFF: 50 POWDER, METERED ORAL; RESPIRATORY (INHALATION) at 08:06

## 2022-09-20 RX ADMIN — MEMANTINE 10 MG: 10 TABLET ORAL at 08:06

## 2022-09-20 RX ADMIN — ASPIRIN 81 MG: 81 TABLET, COATED ORAL at 08:06

## 2022-09-20 RX ADMIN — THIAMINE HCL TAB 100 MG 100 MG: 100 TAB at 08:06

## 2022-09-20 RX ADMIN — MEGESTROL ACETATE 20 MG: 20 TABLET ORAL at 08:06

## 2022-09-20 RX ADMIN — LEVOTHYROXINE SODIUM 88 MCG: 0.09 TABLET ORAL at 08:06

## 2022-09-20 RX ADMIN — Medication 5 MG: at 20:17

## 2022-09-20 RX ADMIN — GABAPENTIN 100 MG: 100 CAPSULE ORAL at 08:06

## 2022-09-20 RX ADMIN — DONEPEZIL HYDROCHLORIDE 10 MG: 10 TABLET ORAL at 20:17

## 2022-09-20 RX ADMIN — MIRTAZAPINE 15 MG: 15 TABLET, FILM COATED ORAL at 20:17

## 2022-09-20 RX ADMIN — BUSPIRONE HYDROCHLORIDE 30 MG: 15 TABLET ORAL at 08:06

## 2022-09-20 RX ADMIN — BENZTROPINE MESYLATE 0.5 MG: 0.5 TABLET ORAL at 08:06

## 2022-09-20 ASSESSMENT — ACTIVITIES OF DAILY LIVING (ADL)
ADLS_ACUITY_SCORE: 77

## 2022-09-20 NOTE — PROGRESS NOTES
Brief Medicine Note  Medicine following after patient experienced fall 9/18. Lab work-up unremarkable and patient refusing imaging. Neuro checks have been at baseline and no acute changes noted, per nursing notes. No further work-up indicated at this time.    Medicine will sign off now. Thank you for allowing us to be a part of this patient's care. Please notify on call SHREE if any medical issues arise.     Laura Lowry PA-C  Hospitalist Service

## 2022-09-20 NOTE — PLAN OF CARE
Problem: Sleep Disturbance (Psychotic Signs/Symptoms)  Goal: Improved Sleep (Psychotic Signs/Symptoms)  Outcome: Ongoing, Progressing   Goal Outcome Evaluation:    Patient is on SIO due to risk for self-injury related to history of fall. Slept a total of 9 hours.

## 2022-09-20 NOTE — PLAN OF CARE
"  Problem: Behavioral Health Plan of Care  Goal: Plan of Care Review  Outcome: Ongoing, Not Progressing  Flowsheets (Taken 9/20/2022 1142)  Plan of Care Reviewed With: patient  Patient Agreement with Plan of Care: refuses to participate   Goal Outcome Evaluation:    Plan of Care Reviewed With: patient     Patient is calm and medication compliant, isolative and withdrawn, did not attend groups. Oriented to self only, patient reports feeling \"fine\" and  wants to know \"when am I getting the hell out of here\". No acute neurological changes observed, patient appears to be at baseline. Fair appetite, ate 25% of breakfast and 75% of lunch.     "

## 2022-09-20 NOTE — PROGRESS NOTES
"PSYCHIATRY  PROGRESS NOTE     DATE OF SERVICE   09/19/2022       CHIEF COMPLAINT   Patient was admitted due to inability to safely care for himself and psychosis.       SUBJECTIVE   Nursing reports:   Patient evaluation continues. Assessed mood,anxiety,thoughts and behavior. Patient continues to be unsteady on his feet at times. Pt does not ask for assist. Pt is steady during the day and is assisted to and from lounge by wheel chair or standby assist. Pt is progressing towards goals mentally, no out bursts and no behavior issues. Will continue to monitor and document progress as far as gait. Pt has fall risk interventions in place. Patient is encouraged to participate in groups and assisted to develop healthy coping skills.  Patient admits to auditory or hallucinations and  at times is overheard talking and/or   /83 (Patient Position: Supine)   Pulse 94   Temp 98  F (36.7  C) (Temporal)   Resp 16   Ht 1.778 m (5' 10\")   Wt 79.1 kg (174 lb 6.1 oz)   SpO2 99%   BMI 25.02 kg/m       Patient has been discussed in detail with the  during team meeting.    -Reviewed pt s chart  -Rounded with team in review of pt's care and plan  -Called LSS and got the name and emailfor pt's guardian as follows:  Hallie Spaulding. E-mail: [email or web address]  Paperwork has been sent to pt's legal guardian to sign and return; none has been received. Writer has made several calls and had sent an email; none has been returned. Writer received a voicemail today and returned the call but did not get a response            OBJECTIVE   Patient was seen and evaluated at bedside with one-to-one present during the assessment, this was a face-to-face evaluation.  Patient informed me that at times he feels dizzy when he gets out of bed.  I talked to the patient about being more careful and asking for help when he needs to walk.  At the time patient verbalized good understanding and stated that he will ask the staff to help " "him.  I also advised the patient to increase his fluid intake which he said he will do.  At this time patient is denying all psychiatric symptoms.  Patient has been alert and oriented only to self.  At times he has continued to hallucinate and remains grandiose, (thinking that he has money and a mansion).     MEDICATIONS   Medications:  Scheduled Meds:    aspirin  81 mg Oral Daily     bacitracin   Topical BID     benztropine  0.5 mg Oral BID     busPIRone HCl  30 mg Oral BID     cloZAPine  250 mg Oral At Bedtime     donepezil  10 mg Oral At Bedtime     gabapentin  100 mg Oral TID w/meals     levothyroxine  88 mcg Oral Daily     megestrol  20 mg Oral Daily     melatonin  5 mg Oral At Bedtime     memantine  10 mg Oral BID     mirtazapine  15 mg Oral At Bedtime     multivitamin, therapeutic  1 tablet Oral Daily     salmeterol  1 puff Inhalation BID     thiamine  100 mg Oral Daily     Continuous Infusions:  PRN Meds:.albuterol, alum & mag hydroxide-simethicone, hydrOXYzine, nystatin, polyethylene glycol, senna-docusate    Medication adherence issues: MS Med Adherence Y/N: Yes, Hospitalization  Medication side effects: MEDICATION SIDE EFFECTS: no side effects reported  Benefit: Yes / No: Yes       ROS   A comprehensive review of systems was negative.       MENTAL STATUS EXAM   Vitals: /72   Pulse 99   Temp 98  F (36.7  C) (Temporal)   Resp 16   Ht 1.778 m (5' 10\")   Wt 79.1 kg (174 lb 6.1 oz)   SpO2 97%   BMI 25.02 kg/m       Appearance:  No apparent distress  Mood: \"I am okay\"  Affect: Calm  Suicidal Ideation: Denies  Homicidal Ideation: Denies  Thought process: Disorganized and concrete  Thought content: Remains confused and delusional and hallucinating at times.    Fund of Knowledge: Below average  Attention/Concentration: Poor  Language ability: Significantly impaired  Memory: Global memory impairment  Insight:  Poor.  Judgement: Poor  Orientation: Only to person  Psychomotor Behavior: slowed    Muscle " Strength and Tone: MuscleStrength: Normal and Atrophy  Gait and Station: Not evaluated       LABS   personally reviewed.     No results found for: PHENYTOIN, PHENOBARB, VALPROATE, CBMZ       DIAGNOSIS   Principal Problem:    Major neurocognitive disorder, due to multiple etiologies, with behavioral disturbance, severe    Active Problem List:  Patient Active Problem List   Diagnosis     TBI with aggressive behavior     HTN (hypertension)     COPD (chronic obstructive pulmonary disease) (H)     Dementia with behavioral disturbance (H)     Chronic schizophrenia (H)     Smoker     Agitation     Behavior disturbance     Psychosis (H)     Major neurocognitive disorder, due to multiple etiologies, with behavioral disturbance, severe (H)     Paranoid schizophrenia, chronic condition with acute exacerbation (H)     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 Ongoing education given regarding diagnostic and treatment options with risks, benefits and alternatives and adequate verbalization of understanding.  2.  Medications       BuSpar 30 mg 2 times daily       Cogentin 0.5 mg 2 times a day       Clozaril 250 mg at bedtime       Aricept 10 mg at bedtime       Gabapentin 100 mg 3 times a day       Namenda 10 mg 2 times a day       Remeron 15 mg at bedtime       melatonin 5 mg at bedtime  3.  Medical team following the patient   4.   coordinating a safe discharge plan with the patient.  5.  Labs have been ordered, liver functions remained stable consistent with the hep C diagnosis.      Risk Assessment: [facility] RISK ASSESSMENT: Patient able to contract for safety    Coordination of Care:   Treatment Plan reviewed and physician signed, Care discussed with Care/Treatment Team Members, Chart reviewed and Patient seen      Re-Certification I certify  that the inpatient psychiatric facility services furnished since the previous certification were, and continue to be, medically necessary for, either, treatment which could reasonably be expected to improve the patient s condition or diagnostic study and that the hospital records indicate that the services furnished were, either, intensive treatment services, admission and related services necessary for diagnostic study, or equivalent services.     I certify that the patient continues to need, on a daily basis, active treatment furnished directly by or requiring the supervision of inpatient psychiatric facility personnel.   I estimate 14 days of hospitalization is necessary for proper treatment of the patient. My plans for post-hospital care for this patient are  TBD     Ginger Dubon MD    -     09/19/2022  -     8:17 PM    Total time 15 minutes with > 50%spent on coordination of cares and psycho-education.    This note was created with help of Dragon dictation system. Grammatical / typing errors are not intentional.    Ginger Dubon MD

## 2022-09-20 NOTE — PLAN OF CARE
Pt was isolative and withdrawn in room most of the shift and  presented with flat affect and poor concentration  Neuro intact and Pt denies discomfort  and pain   Pt continue on 1/1 SIO  10ft for  Self injury risk  Pt is on fall precautions. No  fall observed or reported this shift.  No harm and assault  to self and others noted or reported this shift.   Staff  Sitting for SIO reported that pt was observed hallucinating but Pt denied it.    Pt is medication compliant and contracted for safety.   Will continue to monitor and assist as needed.       Problem: Mobility Impairment  Goal: Optimal Mobility Romayor and Safety  Outcome: Ongoing, Progressing     Problem: Fall Injury Risk  Goal: Absence of Fall and Fall-Related Injury  Intervention: Identify and Manage Contributors  Recent Flowsheet Documentation  Taken 9/19/2022 1925 by Lucía Kirkpatrick RN  Medication Review/Management: medications reviewed  Intervention: Promote Injury-Free Environment  Recent Flowsheet Documentation  Taken 9/19/2022 1925 by Lucía Kirkpatrick, RN  Safety Promotion/Fall Prevention: check orthostatic blood pressure   Goal Outcome Evaluation:  Plan of Care Reviewed With: patient

## 2022-09-20 NOTE — PLAN OF CARE
"Goal Outcome Evaluation:    Plan of Care Reviewed With: patient        Pt isolative to bed. Refused to get out of bed for dinner, stating \"I'm too tired.\" BP low: 84/58 P 106. Encouraged frequently to take more fluids, which he did. Also encouraged to use tap bell at bedside for assistance when getting out of bed, which he did later in the evening when he had to use toilet. Gait unsteady. Appetite poor, ate 25% of dinner. Eye contact poor. Affect flat. Poverty of thought and speech. Calm, cooperative. Med-compliant. No complaints of pain or side effects. Hygiene poor, refuses assistance with shower. Vitals improved later in evening: sitting /76 P 101; standing /79 P 115. Continue with current treatment plan and recommendations. Continue to monitor and reassess symptoms. Monitor response to medications. Monitor progress towards treatment goals. Encourage groups and participation.          "

## 2022-09-20 NOTE — PLAN OF CARE
Problem: Sleep Disturbance (Psychotic Signs/Symptoms)  Goal: Improved Sleep (Psychotic Signs/Symptoms)  9/20/2022 0502 by Navya Terry RN  Outcome: Ongoing, Progressing        Patient slept a total of 9 hours. He got up and voided three times in the bathroom. He was noted to have a steadier gait tonight as compared to the previous nights. No headache and dizziness noted. Neuroassessment done with no abnormal findings. No behavioral issues raised the whole shift.

## 2022-09-20 NOTE — PROGRESS NOTES
PSYCHIATRY  PROGRESS NOTE     DATE OF SERVICE   09/20/2022       CHIEF COMPLAINT   Patient was admitted due to inability to safely care for himself and psychosis.       SUBJECTIVE   Nursing reports:  Patient slept a total of 9 hours. He got up and voided three times in the bathroom. He was noted to have a steadier gait tonight as compared to the previous nights. No headache and dizziness noted. Neuroassessment done with no abnormal findings. No behavioral issues raised the whole shift.      Patient has been discussed in detail with the  during team meeting.  Patient has a guardian assigned we are waiting for this person to communicate with us in order to start the discharge process.     OBJECTIVE   Patient was seen and evaluated at bedside with one-to-one present during the assessment, this was a face-to-face evaluation.  Patient reported that he has continued to feel tired but denies any other issues.  I discussed with the patient that he will be important for him to ask for help when he wants to get out of bed.  Patient stated that he does not need help and he can be independent.  I then asked the patient to take it slowly when he is getting in and out of bed as there is a risk of him falling.  Patient only said that he is leaving the hospital tonight.  Despite me redirecting the patient he insisted that he is leaving tonight.  At that time I decided not to clarify his discharge plan as the patient has a poor understanding in is not able to process information.    He remains grandiose thinking that he has at home (a mansion).  He has continued to have some auditory hallucinations and talking to his mother does not present in the room.     MEDICATIONS   Medications:  Scheduled Meds:    aspirin  81 mg Oral Daily     bacitracin   Topical BID     benztropine  0.5 mg Oral BID     busPIRone HCl  30 mg Oral BID     cloZAPine  250 mg Oral At Bedtime     donepezil  10 mg Oral At Bedtime     gabapentin  100 mg  "Oral TID w/meals     levothyroxine  88 mcg Oral Daily     megestrol  20 mg Oral Daily     melatonin  5 mg Oral At Bedtime     memantine  10 mg Oral BID     mirtazapine  15 mg Oral At Bedtime     multivitamin, therapeutic  1 tablet Oral Daily     salmeterol  1 puff Inhalation BID     thiamine  100 mg Oral Daily     Continuous Infusions:  PRN Meds:.albuterol, alum & mag hydroxide-simethicone, hydrOXYzine, nystatin, polyethylene glycol, senna-docusate    Medication adherence issues: MS Med Adherence Y/N: Yes, Hospitalization  Medication side effects: MEDICATION SIDE EFFECTS: no side effects reported  Benefit: Yes / No: Yes       ROS   A comprehensive review of systems was negative.       MENTAL STATUS EXAM   Vitals: BP 92/69   Pulse 100   Temp 97.6  F (36.4  C)   Resp 16   Ht 1.778 m (5' 10\")   Wt 97.9 kg (215 lb 13.3 oz)   SpO2 100%   BMI 30.97 kg/m       Appearance:  No apparent distress  Mood: \"I am tired\"  Affect: Calm  Suicidal Ideation: Denies  Homicidal Ideation: Denies  Thought process: Disorganized and concrete  Thought content: Remains confused and delusional and hallucinating at times.    Fund of Knowledge: Below average  Attention/Concentration: Poor  Language ability: Significantly impaired  Memory: Global memory impairment  Insight:  Poor.  Judgement: Poor  Orientation: Only to person  Psychomotor Behavior: slowed    Muscle Strength and Tone: MuscleStrength: Normal and Atrophy  Gait and Station: Not evaluated       LABS   personally reviewed.     No results found for: PHENYTOIN, PHENOBARB, VALPROATE, CBMZ       DIAGNOSIS   Principal Problem:    Major neurocognitive disorder, due to multiple etiologies, with behavioral disturbance, severe    Active Problem List:  Patient Active Problem List   Diagnosis     TBI with aggressive behavior     HTN (hypertension)     COPD (chronic obstructive pulmonary disease) (H)     Dementia with behavioral disturbance (H)     Chronic schizophrenia (H)     Smoker     " Agitation     Behavior disturbance     Psychosis (H)     Major neurocognitive disorder, due to multiple etiologies, with behavioral disturbance, severe (H)     Paranoid schizophrenia, chronic condition with acute exacerbation (H)     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 Ongoing education given regarding diagnostic and treatment options with risks, benefits and alternatives and adequate verbalization of understanding.  2.  Medications       BuSpar 30 mg 2 times daily       Cogentin 0.5 mg 2 times a day       Clozaril 250 mg at bedtime       Aricept 10 mg at bedtime       Gabapentin 100 mg 3 times a day       Namenda 10 mg 2 times a day       Remeron 15 mg at bedtime       melatonin 5 mg at bedtime  3.  Medical team following the patient   4.   coordinating a safe discharge plan with the patient.  5.  Labs have been ordered, liver functions remained stable consistent with the hep C diagnosis.      Risk Assessment: [facility] RISK ASSESSMENT: Patient able to contract for safety    Coordination of Care:   Treatment Plan reviewed and physician signed, Care discussed with Care/Treatment Team Members, Chart reviewed and Patient seen      Re-Certification I certify that the inpatient psychiatric facility services furnished since the previous certification were, and continue to be, medically necessary for, either, treatment which could reasonably be expected to improve the patient s condition or diagnostic study and that the hospital records indicate that the services furnished were, either, intensive treatment services, admission and related services necessary for diagnostic study, or equivalent services.     I certify that the patient continues to need, on a daily basis, active treatment furnished directly by or requiring the supervision  of inpatient psychiatric facility personnel.   I estimate 14 days of hospitalization is necessary for proper treatment of the patient. My plans for post-hospital care for this patient are  TBD     Ginger Dubon MD    -     09/20/2022  -     11:28 AM    Total time 25 minutes with > 50%spent on coordination of cares and psycho-education.    This note was created with help of Dragon dictation system. Grammatical / typing errors are not intentional.    Ginger Dubon MD

## 2022-09-20 NOTE — PLAN OF CARE
Assessment/Intervention/Current Symptoms and Care Coordination:     -Reviewed pt s chart  -Rounded with team in review of pt's care and plan  -Spoke with LSS guardian and sent her OCTAVIA for [facility]  -Received information that [facility] had an opening for this patient. Writer followed up and was told by staff that she was told initially that the pt had been placed on another facility. Writer clarified that this patient is still in the hospital and in need of a facility. Jo (staff at [facility]) then requested this writer to send her new clinical documentation. Stated that her nurse will have to review it before deciding whether to accept this patient.   Awaiting receipt of additional paperwork from pt's guardian.          [place] [facility] staff,  Staff (Carlo - [phone])         Discharge Plan or Goal:     Pt is currently awaiting discharge to an AFC or nursing home. Information has been sent to [facility] (memory care)      Barriers to Discharge:  - Funding  -[place] [facility] to be expeditiously done     Referral Status:  - [facility]  - [facility]     Legal Status:  Court Hold     Contact:  Joi Roque  [facility]   [facility]  Phone: [phone]    Fax: [phone]

## 2022-09-21 PROCEDURE — 124N000003 HC R&B MH SENIOR/ADOLESCENT

## 2022-09-21 PROCEDURE — 250N000013 HC RX MED GY IP 250 OP 250 PS 637: Performed by: PSYCHIATRY & NEUROLOGY

## 2022-09-21 PROCEDURE — 250N000013 HC RX MED GY IP 250 OP 250 PS 637: Performed by: PHYSICIAN ASSISTANT

## 2022-09-21 RX ADMIN — MEMANTINE 10 MG: 10 TABLET ORAL at 08:48

## 2022-09-21 RX ADMIN — LEVOTHYROXINE SODIUM 88 MCG: 0.09 TABLET ORAL at 08:48

## 2022-09-21 RX ADMIN — BENZTROPINE MESYLATE 0.5 MG: 0.5 TABLET ORAL at 08:48

## 2022-09-21 RX ADMIN — GABAPENTIN 100 MG: 100 CAPSULE ORAL at 11:54

## 2022-09-21 RX ADMIN — DONEPEZIL HYDROCHLORIDE 10 MG: 10 TABLET ORAL at 20:25

## 2022-09-21 RX ADMIN — GABAPENTIN 100 MG: 100 CAPSULE ORAL at 08:48

## 2022-09-21 RX ADMIN — GABAPENTIN 100 MG: 100 CAPSULE ORAL at 17:13

## 2022-09-21 RX ADMIN — BUSPIRONE HYDROCHLORIDE 30 MG: 15 TABLET ORAL at 20:26

## 2022-09-21 RX ADMIN — CLOZAPINE 250 MG: 100 TABLET ORAL at 20:25

## 2022-09-21 RX ADMIN — SALMETEROL XINAFOATE 1 PUFF: 50 POWDER, METERED ORAL; RESPIRATORY (INHALATION) at 20:25

## 2022-09-21 RX ADMIN — THIAMINE HCL TAB 100 MG 100 MG: 100 TAB at 08:48

## 2022-09-21 RX ADMIN — BACITRACIN ZINC: 500 OINTMENT TOPICAL at 20:26

## 2022-09-21 RX ADMIN — BUSPIRONE HYDROCHLORIDE 30 MG: 15 TABLET ORAL at 08:48

## 2022-09-21 RX ADMIN — BACITRACIN ZINC: 500 OINTMENT TOPICAL at 08:49

## 2022-09-21 RX ADMIN — SALMETEROL XINAFOATE 1 PUFF: 50 POWDER, METERED ORAL; RESPIRATORY (INHALATION) at 08:48

## 2022-09-21 RX ADMIN — THERA TABS 1 TABLET: TAB at 08:48

## 2022-09-21 RX ADMIN — MIRTAZAPINE 15 MG: 15 TABLET, FILM COATED ORAL at 20:25

## 2022-09-21 RX ADMIN — BENZTROPINE MESYLATE 0.5 MG: 0.5 TABLET ORAL at 20:25

## 2022-09-21 RX ADMIN — Medication 5 MG: at 20:25

## 2022-09-21 RX ADMIN — ASPIRIN 81 MG: 81 TABLET, COATED ORAL at 08:48

## 2022-09-21 RX ADMIN — MEMANTINE 10 MG: 10 TABLET ORAL at 20:26

## 2022-09-21 RX ADMIN — MEGESTROL ACETATE 20 MG: 20 TABLET ORAL at 08:48

## 2022-09-21 ASSESSMENT — ACTIVITIES OF DAILY LIVING (ADL)
ADLS_ACUITY_SCORE: 77

## 2022-09-21 NOTE — PLAN OF CARE
Problem: Sleep Disturbance (Psychotic Signs/Symptoms)  Goal: Improved Sleep (Psychotic Signs/Symptoms)  Outcome: Ongoing, Progressing   Goal Outcome Evaluation:     Patient slept a total of 9.5 hours without any interruptions. Patient did not get up the whole night to void in the bathroom. No agitation and psychotic signs and symptoms noted the whole night.

## 2022-09-21 NOTE — PROGRESS NOTES
Patient was seen and evaluated at bedside by himself, this was a face-to-face evaluation.  Patient remained stable and no major issues have been reported.   has continued to follow-up on the referrals for patient's discharge but we do not have a date set up yet.    Ginger Dubon MD

## 2022-09-21 NOTE — PLAN OF CARE
Problem: Behavioral Health Plan of Care  Goal: Plan of Care Review  Outcome: Ongoing, Not Progressing  Flowsheets (Taken 9/21/2022 0958)  Plan of Care Reviewed With: patient  Patient Agreement with Plan of Care: unable to participate   Goal Outcome Evaluation:    Plan of Care Reviewed With: patient     Patient is calm medication compliant, remains isolative and withdrawn. Activity encouraged, patient did not attend groups or socialize with peers. Good appetite, ate 100% of breakfast and 75% of lunch, drinking adequate amounts of fluids. Patient is waiting for placement, will continue with POC.

## 2022-09-21 NOTE — PLAN OF CARE
"Assessment/Intervention/Current Symptoms and Care Coordination:     -Reviewed pt s chart  -Rounded with team in review of pt's care and plan  -Pt will be having an interview tomorrow at 11:00am  [facility]:  Kathryn Chi RN  Nurse   [facility] Support Office  [address], [address]  [place] [address]  Phone: [phone] (cell)  Fax: [phone]  Email: [email or web address]          [place] [facility] staff,   sent the following message today:  \"[employer] assessment is scheduled for 10/5 @ 11am. Just to clarify waiver funding and waiver slot cannot be requested until the [facility] has occurred and Slick and I agree to service needs.\"   [facility]  Information: [email or web address]            Discharge Plan or Goal:     Pt is currently awaiting discharge to an A[facility] or nursing home. Information has been sent to [facility] (memory care)      Barriers to Discharge:  - Funding  -[place] [facility] to be expeditiously done     Referral Status:  - [facility]  - [facility]     Legal Status:  Court Hold     Contact:  Joi Roque  [facility]   [facility]  Phone: [phone]    Fax: [phone]    "

## 2022-09-22 PROCEDURE — 99231 SBSQ HOSP IP/OBS SF/LOW 25: CPT | Performed by: PSYCHIATRY & NEUROLOGY

## 2022-09-22 PROCEDURE — 250N000013 HC RX MED GY IP 250 OP 250 PS 637: Performed by: PHYSICIAN ASSISTANT

## 2022-09-22 PROCEDURE — 250N000013 HC RX MED GY IP 250 OP 250 PS 637: Performed by: PSYCHIATRY & NEUROLOGY

## 2022-09-22 PROCEDURE — 124N000003 HC R&B MH SENIOR/ADOLESCENT

## 2022-09-22 RX ADMIN — GABAPENTIN 100 MG: 100 CAPSULE ORAL at 08:10

## 2022-09-22 RX ADMIN — BUSPIRONE HYDROCHLORIDE 30 MG: 15 TABLET ORAL at 08:11

## 2022-09-22 RX ADMIN — GABAPENTIN 100 MG: 100 CAPSULE ORAL at 17:21

## 2022-09-22 RX ADMIN — BACITRACIN ZINC: 500 OINTMENT TOPICAL at 08:11

## 2022-09-22 RX ADMIN — BENZTROPINE MESYLATE 0.5 MG: 0.5 TABLET ORAL at 08:10

## 2022-09-22 RX ADMIN — THIAMINE HCL TAB 100 MG 100 MG: 100 TAB at 08:11

## 2022-09-22 RX ADMIN — MEMANTINE 10 MG: 10 TABLET ORAL at 20:52

## 2022-09-22 RX ADMIN — MEMANTINE 10 MG: 10 TABLET ORAL at 08:10

## 2022-09-22 RX ADMIN — THERA TABS 1 TABLET: TAB at 08:10

## 2022-09-22 RX ADMIN — SALMETEROL XINAFOATE 1 PUFF: 50 POWDER, METERED ORAL; RESPIRATORY (INHALATION) at 08:11

## 2022-09-22 RX ADMIN — MEGESTROL ACETATE 20 MG: 20 TABLET ORAL at 08:10

## 2022-09-22 RX ADMIN — ASPIRIN 81 MG: 81 TABLET, COATED ORAL at 08:10

## 2022-09-22 RX ADMIN — BUSPIRONE HYDROCHLORIDE 30 MG: 15 TABLET ORAL at 20:52

## 2022-09-22 RX ADMIN — MIRTAZAPINE 15 MG: 15 TABLET, FILM COATED ORAL at 20:52

## 2022-09-22 RX ADMIN — GABAPENTIN 100 MG: 100 CAPSULE ORAL at 13:30

## 2022-09-22 RX ADMIN — CLOZAPINE 250 MG: 100 TABLET ORAL at 20:52

## 2022-09-22 RX ADMIN — DONEPEZIL HYDROCHLORIDE 10 MG: 10 TABLET ORAL at 20:52

## 2022-09-22 RX ADMIN — BENZTROPINE MESYLATE 0.5 MG: 0.5 TABLET ORAL at 20:52

## 2022-09-22 RX ADMIN — SALMETEROL XINAFOATE 1 PUFF: 50 POWDER, METERED ORAL; RESPIRATORY (INHALATION) at 20:55

## 2022-09-22 RX ADMIN — BACITRACIN ZINC: 500 OINTMENT TOPICAL at 20:53

## 2022-09-22 RX ADMIN — Medication 5 MG: at 20:52

## 2022-09-22 RX ADMIN — LEVOTHYROXINE SODIUM 88 MCG: 0.09 TABLET ORAL at 08:10

## 2022-09-22 ASSESSMENT — ACTIVITIES OF DAILY LIVING (ADL)
ADLS_ACUITY_SCORE: 77

## 2022-09-22 NOTE — PLAN OF CARE
Goal Outcome Evaluation:    Plan of Care Reviewed With: patient        Pt isolative to bed. Agreed to come to dining room in wheelchair. Unable to stand long enough for standing BP to be completed. Reported feeling weak. Eye contact poor. Affect flat. Poverty of thought and speech. Calm, cooperative. Med-compliant. No complaints of pain or side effects. Hygiene poor, refuses assistance with shower. Encouraged frequently to take fluids. Also encouraged to use tap bell at bedside for assistance when getting out of bed. Bathroom light turned on at  for night light. Continue with current treatment plan and recommendations. Continue to monitor and reassess symptoms. Monitor response to medications. Monitor progress towards treatment goals. Encourage groups and participation.

## 2022-09-22 NOTE — PLAN OF CARE
Problem: Sleep Disturbance (Psychotic Signs/Symptoms)  Goal: Improved Sleep (Psychotic Signs/Symptoms)  Outcome: Ongoing, Progressing   Goal Outcome Evaluation:        Patient slept a total of 10 hours. He got up once and voided in the bathroom. No agitation and other behavioral issues noted the whole shift.

## 2022-09-22 NOTE — PLAN OF CARE
"Assessment/Intervention/Current Symptoms and Care Coordination:     -Reviewed pt s chart  -Rounded with team in review of pt's care and plan  -Pt will be had an assessment today  By [facility]. Writer also met with Kathryn and provided update.  Kathryn Chi, RN  Nurse   [facility] Support Office  [address], [address]  [place] [address]  Phone: [phone] (cell)  Fax: [phone]  Email: [email or web address]    UPDATE: Pt was interviewed today by [facility].  He has been accepted into the program - Memory care. Per staff, they will need to agree on a rate with pt's CADI waiver .  Staff reported she will be sending a list of what they need to this writer. [place] has been set for October 5, 2022 at 11:00am.           [place] staff,   sent the following message today:  \"[place] scheduled for 10/5 @ 11am. Just to clarify waiver funding and waiver slot cannot be requested until the [place]choices has occurred and [place] and I agree to service needs.\"   [place]  Information: [email or web address]              Discharge Plan or Goal:     Pt is currently awaiting discharge to an AFC or nursing home. Information has been sent to [facility] (memory care)      Barriers to Discharge:  - Funding  -[place] done     Referral Status:  - [facility]  - [facility]     Legal Status:  Court Hold     Contact:  Joi Roque  [facility]   [facility]  Phone: [phone]    Fax: [phone]    "

## 2022-09-22 NOTE — PLAN OF CARE
"  Problem: Behavior Regulation Impairment (Psychotic Signs/Symptoms)  Goal: Improved Behavioral Control (Psychotic Signs/Symptoms)  Outcome: Met   Goal Outcome Evaluation:    Plan of Care Reviewed With: patient             Nursing Assessment    Psychosis (H) [F29]    Admit Date: 1/26/2022    Length of Stay: 239    Patient evaluation continues. Assessed mood,anxiety,thoughts and behavior. Patient is progressing towards goals. Patient is encouraged to participate in groups and assisted to develop healthy coping skills.  Patient admits to auditory hallucinations. /71   Pulse 92   Temp 97.3  F (36.3  C) (Temporal)   Resp 17   Ht 1.778 m (5' 10\")   Wt 97.9 kg (215 lb 13.3 oz)   SpO2 96%   BMI 30.97 kg/m      Mood: good    Patient reports depression none and reports anxiety none    Affect:flat blunted    Sleep:good, naps during the day    Appetite: good    SI: denies    HI: denies    SIB: denies      Medication Compliance yes    Group participation:no    ADL's: assist as pt allows, refuses cares at times    Fall risk interventions: proper foot wear, orthostatic B/P, standby assist    Rinku Score Interventions: none    Discharge planning next week    Refer to daily team meeting notes for individualized plan of care. Nursing will continue to assess.    *Scale is 1-10 and 10 is the worst.           "

## 2022-09-23 LAB
BASOPHILS # BLD AUTO: 0.1 10E3/UL (ref 0–0.2)
BASOPHILS NFR BLD AUTO: 1 %
EOSINOPHIL # BLD AUTO: 1.4 10E3/UL (ref 0–0.7)
EOSINOPHIL NFR BLD AUTO: 19 %
ERYTHROCYTE [DISTWIDTH] IN BLOOD BY AUTOMATED COUNT: 13.7 % (ref 10–15)
HCT VFR BLD AUTO: 45.4 % (ref 40–53)
HGB BLD-MCNC: 16 G/DL (ref 13.3–17.7)
HOLD SPECIMEN: NORMAL
IMM GRANULOCYTES # BLD: 0 10E3/UL
IMM GRANULOCYTES NFR BLD: 0 %
LYMPHOCYTES # BLD AUTO: 1.8 10E3/UL (ref 0.8–5.3)
LYMPHOCYTES NFR BLD AUTO: 25 %
MCH RBC QN AUTO: 32.1 PG (ref 26.5–33)
MCHC RBC AUTO-ENTMCNC: 35.2 G/DL (ref 31.5–36.5)
MCV RBC AUTO: 91 FL (ref 78–100)
MONOCYTES # BLD AUTO: 0.8 10E3/UL (ref 0–1.3)
MONOCYTES NFR BLD AUTO: 11 %
NEUTROPHILS # BLD AUTO: 3.3 10E3/UL (ref 1.6–8.3)
NEUTROPHILS NFR BLD AUTO: 44 %
NRBC # BLD AUTO: 0 10E3/UL
NRBC BLD AUTO-RTO: 0 /100
PLATELET # BLD AUTO: 153 10E3/UL (ref 150–450)
RBC # BLD AUTO: 4.98 10E6/UL (ref 4.4–5.9)
VIT B1 PYROPHOSHATE BLD-SCNC: 148 NMOL/L
WBC # BLD AUTO: 7.3 10E3/UL (ref 4–11)

## 2022-09-23 PROCEDURE — 85025 COMPLETE CBC W/AUTO DIFF WBC: CPT | Performed by: PSYCHIATRY & NEUROLOGY

## 2022-09-23 PROCEDURE — 99231 SBSQ HOSP IP/OBS SF/LOW 25: CPT | Performed by: PSYCHIATRY & NEUROLOGY

## 2022-09-23 PROCEDURE — 250N000013 HC RX MED GY IP 250 OP 250 PS 637: Performed by: PSYCHIATRY & NEUROLOGY

## 2022-09-23 PROCEDURE — 124N000003 HC R&B MH SENIOR/ADOLESCENT

## 2022-09-23 PROCEDURE — 250N000013 HC RX MED GY IP 250 OP 250 PS 637: Performed by: PHYSICIAN ASSISTANT

## 2022-09-23 PROCEDURE — 36415 COLL VENOUS BLD VENIPUNCTURE: CPT | Performed by: PSYCHIATRY & NEUROLOGY

## 2022-09-23 RX ADMIN — GABAPENTIN 100 MG: 100 CAPSULE ORAL at 08:07

## 2022-09-23 RX ADMIN — BENZTROPINE MESYLATE 0.5 MG: 0.5 TABLET ORAL at 08:07

## 2022-09-23 RX ADMIN — GABAPENTIN 100 MG: 100 CAPSULE ORAL at 12:00

## 2022-09-23 RX ADMIN — BENZTROPINE MESYLATE 0.5 MG: 0.5 TABLET ORAL at 19:46

## 2022-09-23 RX ADMIN — SALMETEROL XINAFOATE 1 PUFF: 50 POWDER, METERED ORAL; RESPIRATORY (INHALATION) at 19:47

## 2022-09-23 RX ADMIN — Medication 5 MG: at 19:46

## 2022-09-23 RX ADMIN — ASPIRIN 81 MG: 81 TABLET, COATED ORAL at 08:07

## 2022-09-23 RX ADMIN — MEMANTINE 10 MG: 10 TABLET ORAL at 19:45

## 2022-09-23 RX ADMIN — MEMANTINE 10 MG: 10 TABLET ORAL at 08:07

## 2022-09-23 RX ADMIN — BACITRACIN ZINC: 500 OINTMENT TOPICAL at 08:08

## 2022-09-23 RX ADMIN — BUSPIRONE HYDROCHLORIDE 30 MG: 15 TABLET ORAL at 19:46

## 2022-09-23 RX ADMIN — CLOZAPINE 250 MG: 100 TABLET ORAL at 19:45

## 2022-09-23 RX ADMIN — GABAPENTIN 100 MG: 100 CAPSULE ORAL at 17:30

## 2022-09-23 RX ADMIN — MEGESTROL ACETATE 20 MG: 20 TABLET ORAL at 08:06

## 2022-09-23 RX ADMIN — SALMETEROL XINAFOATE 1 PUFF: 50 POWDER, METERED ORAL; RESPIRATORY (INHALATION) at 08:08

## 2022-09-23 RX ADMIN — MIRTAZAPINE 15 MG: 15 TABLET, FILM COATED ORAL at 19:46

## 2022-09-23 RX ADMIN — LEVOTHYROXINE SODIUM 88 MCG: 0.09 TABLET ORAL at 08:07

## 2022-09-23 RX ADMIN — DONEPEZIL HYDROCHLORIDE 10 MG: 10 TABLET ORAL at 19:46

## 2022-09-23 RX ADMIN — THERA TABS 1 TABLET: TAB at 08:07

## 2022-09-23 RX ADMIN — BUSPIRONE HYDROCHLORIDE 30 MG: 15 TABLET ORAL at 08:06

## 2022-09-23 RX ADMIN — THIAMINE HCL TAB 100 MG 100 MG: 100 TAB at 08:07

## 2022-09-23 ASSESSMENT — ACTIVITIES OF DAILY LIVING (ADL)
ADLS_ACUITY_SCORE: 77
ADLS_ACUITY_SCORE: 80
ADLS_ACUITY_SCORE: 77
ADLS_ACUITY_SCORE: 77
ADLS_ACUITY_SCORE: 80
ADLS_ACUITY_SCORE: 77

## 2022-09-23 NOTE — PLAN OF CARE
"Assessment/Intervention/Current Symptoms and Care Coordination:     -Reviewed pt s chart  -Rounded with team in review of pt's care and plan  -Has been accepted by [facility]         [place] staff,   sent the following message today:  \"[place] scheduled for 10/5 @ 11am. Just to clarify waiver funding and waiver slot cannot be requested until the [place]choices has occurred and [place] and I agree to service needs.\"   M[place]hoice  Information: [email or web address]              Discharge Plan or Goal:   Has been accepted into [facility]. [place]wait[place].      Barriers to Discharge:  - Funding  -[place]     Referral Status:  - [facility]  - [facility]     Legal Status:  Court Hold     Contact:  Joi Roque  [facility]   [facility]  Phone: [phone]    Fax: [phone]    "

## 2022-09-23 NOTE — PLAN OF CARE
"CTC: Message from [facility] Confirming Pt's acceptance and next step:    [facility] John,  We will hold John's spot at the community while we wait for the waiver to open.  As soon as this happens please send me the contact information of the waiver  (unless this is you Kurtis?) so we can set the rate.  Once this is complete we can move him in pretty quickly.  I know John has been in the hospital for a long time, and I will do whatever it takes on my end to ensure a smooth transition for him.\"    "

## 2022-09-23 NOTE — PLAN OF CARE
Problem: Sleep Disturbance (Psychotic Signs/Symptoms)  Goal: Improved Sleep (Psychotic Signs/Symptoms)  Outcome: Ongoing, Progressing   Goal Outcome Evaluation:     Patient slept a total of 9.5 hours.  He went to the bathroom and voided once. No behavioral issues noted the whole shift.

## 2022-09-23 NOTE — PROGRESS NOTES
"PSYCHIATRY  PROGRESS NOTE     DATE OF SERVICE   09/23/2022       CHIEF COMPLAINT   Patient was admitted due to inability to safely care for himself and psychosis.       SUBJECTIVE   Nursing reports:  Patient has remained in his room for the shift. He did come to the lounge for a short period then went back to bed. He denies SI and SIB. He rates depression 10 and anxiety 8. He endorses AH hearing his mother talk to him. He is alert to self. He denies pain. He is disheveled and unkept. He refused to take a shower stating \"I'm too tired\". He did not attend groups. He is not social with peers and only select staff. His affect is flat but became animated at one point. His mood is calm. He ate 25% of his dinner stating \"I'm not hungry\". Fluids encouraged. He voices no concerns.      Patient has been discussed in detail with the  during team meeting.  Currently the [facility] is working on the [place].     OBJECTIVE   Patient was seen and evaluated at bedside by himself, this was a face-to-face evaluation.  Patient stated that today he is doing \"all right\".  He denied all psychiatric symptoms and has been able to contract for safety.  When I tried to discuss with the patient the discharge plans patient stated that he is leaving tonight.  Continues to have auditory hallucinations at times and remains delusional (at his baseline).       MEDICATIONS   Medications:  Scheduled Meds:    aspirin  81 mg Oral Daily     benztropine  0.5 mg Oral BID     busPIRone HCl  30 mg Oral BID     cloZAPine  250 mg Oral At Bedtime     donepezil  10 mg Oral At Bedtime     gabapentin  100 mg Oral TID w/meals     levothyroxine  88 mcg Oral Daily     megestrol  20 mg Oral Daily     melatonin  5 mg Oral At Bedtime     memantine  10 mg Oral BID     mirtazapine  15 mg Oral At Bedtime     multivitamin, therapeutic  1 tablet Oral Daily     salmeterol  1 puff Inhalation BID     thiamine  100 mg Oral Daily     Continuous " "Infusions:  PRN Meds:.albuterol, alum & mag hydroxide-simethicone, hydrOXYzine, nystatin, polyethylene glycol, senna-docusate    Medication adherence issues: MS Med Adherence Y/N: Yes, Hospitalization  Medication side effects: MEDICATION SIDE EFFECTS: no side effects reported  Benefit: Yes / No: Yes       ROS   A comprehensive review of systems was negative.       MENTAL STATUS EXAM   Vitals: /80 (BP Location: Right arm, Patient Position: Sitting)   Pulse 101   Temp 97.2  F (36.2  C) (Temporal)   Resp 18   Ht 1.778 m (5' 10\")   Wt 97.9 kg (215 lb 13.3 oz)   SpO2 96%   BMI 30.97 kg/m       Appearance:  No apparent distress  Mood: \"I want a cigarette\"  Affect: Calm  Suicidal Ideation: Denies  Homicidal Ideation: Denies  Thought process: Disorganized and concrete  Thought content: Remains confused and delusional and hallucinating at times.    Fund of Knowledge: Below average  Attention/Concentration: Poor  Language ability: Significantly impaired  Memory: Global memory impairment  Insight:  Poor.  Judgement: Poor  Orientation: Only to person  Psychomotor Behavior: slowed    Muscle Strength and Tone: MuscleStrength: Normal and Atrophy  Gait and Station: Not evaluated       LABS   personally reviewed.     No results found for: PHENYTOIN, PHENOBARB, VALPROATE, CBMZ       DIAGNOSIS   Principal Problem:    Major neurocognitive disorder, due to multiple etiologies, with behavioral disturbance, severe    Active Problem List:  Patient Active Problem List   Diagnosis     TBI with aggressive behavior     HTN (hypertension)     COPD (chronic obstructive pulmonary disease) (H)     Dementia with behavioral disturbance (H)     Chronic schizophrenia (H)     Smoker     Agitation     Behavior disturbance     Psychosis (H)     Major neurocognitive disorder, due to multiple etiologies, with behavioral disturbance, severe (H)     Paranoid schizophrenia, chronic condition with acute exacerbation (H)     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 Ongoing education given regarding diagnostic and treatment options with risks, benefits and alternatives and adequate verbalization of understanding.  2.  Medications       BuSpar 30 mg 2 times daily       Cogentin 0.5 mg 2 times a day       Clozaril 250 mg at bedtime       Aricept 10 mg at bedtime       Gabapentin 100 mg 3 times a day       Namenda 10 mg 2 times a day       Remeron 15 mg at bedtime       melatonin 5 mg at bedtime  3.  Medical team following the patient   4.   coordinating a safe discharge plan with the patient.  5.  Labs have been ordered, liver functions remained stable consistent with the hep C diagnosis.      Risk Assessment: [facility] RISK ASSESSMENT: Patient able to contract for safety    Coordination of Care:   Treatment Plan reviewed and physician signed, Care discussed with Care/Treatment Team Members, Chart reviewed and Patient seen      Re-Certification I certify that the inpatient psychiatric facility services furnished since the previous certification were, and continue to be, medically necessary for, either, treatment which could reasonably be expected to improve the patient s condition or diagnostic study and that the hospital records indicate that the services furnished were, either, intensive treatment services, admission and related services necessary for diagnostic study, or equivalent services.     I certify that the patient continues to need, on a daily basis, active treatment furnished directly by or requiring the supervision of inpatient psychiatric facility personnel.   I estimate 14 days of hospitalization is necessary for proper treatment of the patient. My plans for post-hospital care for this patient are  TBD     Ginger Dubon MD    -     09/23/2022  -      2:17 PM    Total time 15 minutes with > 50%spent on coordination of cares and psycho-education.    This note was created with help of Dragon dictation system. Grammatical / typing errors are not intentional.    Ginger Dubon MD

## 2022-09-23 NOTE — PROGRESS NOTES
"PSYCHIATRY  PROGRESS NOTE     DATE OF SERVICE   09/23/2022       CHIEF COMPLAINT   Patient was admitted due to inability to safely care for himself and psychosis.       SUBJECTIVE   Nursing reports:  Patient slept a total of 10 hours. He got up once and voided in the bathroom. No agitation and other behavioral issues noted the whole shift.      Patient has been discussed in detail with the  during team meeting.  Currently the [facility] is working on the [employer] assessment.     OBJECTIVE   Patient was seen and evaluated at bedside by himself, this was a face-to-face evaluation.  Patient stated that the only thing he wants right now is a cigarette.  He denies any other issues or concerns and remains at his baseline.       MEDICATIONS   Medications:  Scheduled Meds:    aspirin  81 mg Oral Daily     benztropine  0.5 mg Oral BID     busPIRone HCl  30 mg Oral BID     cloZAPine  250 mg Oral At Bedtime     donepezil  10 mg Oral At Bedtime     gabapentin  100 mg Oral TID w/meals     levothyroxine  88 mcg Oral Daily     megestrol  20 mg Oral Daily     melatonin  5 mg Oral At Bedtime     memantine  10 mg Oral BID     mirtazapine  15 mg Oral At Bedtime     multivitamin, therapeutic  1 tablet Oral Daily     salmeterol  1 puff Inhalation BID     thiamine  100 mg Oral Daily     Continuous Infusions:  PRN Meds:.albuterol, alum & mag hydroxide-simethicone, hydrOXYzine, nystatin, polyethylene glycol, senna-docusate    Medication adherence issues: MS Med Adherence Y/N: Yes, Hospitalization  Medication side effects: MEDICATION SIDE EFFECTS: no side effects reported  Benefit: Yes / No: Yes       ROS   A comprehensive review of systems was negative.       MENTAL STATUS EXAM   Vitals: /80 (BP Location: Right arm, Patient Position: Sitting)   Pulse 101   Temp 97.2  F (36.2  C) (Temporal)   Resp 18   Ht 1.778 m (5' 10\")   Wt 97.9 kg (215 lb 13.3 oz)   SpO2 96%   BMI 30.97 kg/m       Appearance:  No apparent " "distress  Mood: \"I want a cigarette\"  Affect: Calm  Suicidal Ideation: Denies  Homicidal Ideation: Denies  Thought process: Disorganized and concrete  Thought content: Remains confused and delusional and hallucinating at times.    Fund of Knowledge: Below average  Attention/Concentration: Poor  Language ability: Significantly impaired  Memory: Global memory impairment  Insight:  Poor.  Judgement: Poor  Orientation: Only to person  Psychomotor Behavior: slowed    Muscle Strength and Tone: MuscleStrength: Normal and Atrophy  Gait and Station: Not evaluated       LABS   personally reviewed.     No results found for: PHENYTOIN, PHENOBARB, VALPROATE, CBMZ       DIAGNOSIS   Principal Problem:    Major neurocognitive disorder, due to multiple etiologies, with behavioral disturbance, severe    Active Problem List:  Patient Active Problem List   Diagnosis     TBI with aggressive behavior     HTN (hypertension)     COPD (chronic obstructive pulmonary disease) (H)     Dementia with behavioral disturbance (H)     Chronic schizophrenia (H)     Smoker     Agitation     Behavior disturbance     Psychosis (H)     Major neurocognitive disorder, due to multiple etiologies, with behavioral disturbance, severe (H)     Paranoid schizophrenia, chronic condition with acute exacerbation (H)     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 Ongoing education given regarding diagnostic and treatment options with risks, benefits and alternatives and adequate verbalization of understanding.  2.  Medications       BuSpar 30 mg 2 times daily       Cogentin 0.5 mg 2 times a day       Clozaril 250 mg at bedtime       Aricept 10 mg at bedtime       Gabapentin 100 mg 3 times a day       Namenda 10 mg 2 times a day       Remeron 15 mg at bedtime       melatonin 5 mg at bedtime  3.  " Medical team following the patient   4.   coordinating a safe discharge plan with the patient.  5.  Labs have been ordered, liver functions remained stable consistent with the hep C diagnosis.      Risk Assessment: [facility] RISK ASSESSMENT: Patient able to contract for safety    Coordination of Care:   Treatment Plan reviewed and physician signed, Care discussed with Care/Treatment Team Members, Chart reviewed and Patient seen      Re-Certification I certify that the inpatient psychiatric facility services furnished since the previous certification were, and continue to be, medically necessary for, either, treatment which could reasonably be expected to improve the patient s condition or diagnostic study and that the hospital records indicate that the services furnished were, either, intensive treatment services, admission and related services necessary for diagnostic study, or equivalent services.     I certify that the patient continues to need, on a daily basis, active treatment furnished directly by or requiring the supervision of inpatient psychiatric facility personnel.   I estimate 14 days of hospitalization is necessary for proper treatment of the patient. My plans for post-hospital care for this patient are  TBD     Ginger Dubon MD    -     09/23/2022  -     2:14 PM    Total time 15 minutes with > 50%spent on coordination of cares and psycho-education.    This note was created with help of Dragon dictation system. Grammatical / typing errors are not intentional.    Ginger Dubon MD

## 2022-09-23 NOTE — PLAN OF CARE
"Goal Outcome Evaluation:    Plan of Care Reviewed With: patient     Patient has remained in his room for the shift. He did come to the lounge for a short period then went back to bed. He denies SI and SIB. He rates depression 10 and anxiety 8. He endorses AH hearing his mother talk to him. He is alert to self. He denies pain. He is disheveled and unkept. He refused to take a shower stating \"I'm too tired\". He did not attend groups. He is not social with peers and only select staff. His affect is flat but became animated at one point. His mood is calm. He ate 25% of his dinner stating \"I'm not hungry\". Fluids encouraged. He voices no concerns.                 "

## 2022-09-23 NOTE — PLAN OF CARE
"  Problem: Behavior Regulation Impairment (Psychotic Signs/Symptoms)  Goal: Improved Behavioral Control (Psychotic Signs/Symptoms)  Outcome: Ongoing, Progressing     Problem: Mood Impairment (Psychotic Signs/Symptoms)  Goal: Improved Mood Symptoms (Psychotic Signs/Symptoms)  Outcome: Ongoing, Progressing   Goal Outcome Evaluation:           BP 95/68 (BP Location: Left arm, Patient Position: Sitting, Cuff Size: Adult Small)   Pulse 94   Temp 98.3  F (36.8  C) (Oral)   Resp 18   Ht 1.778 m (5' 10\")   Wt 97.9 kg (215 lb 13.3 oz)   SpO2 99%   BMI 30.97 kg/m     Pt alert to self and cooperative. But flat affect and isolative to his room.Denies SI/SIB/hallucination,and pain. Rated Anxiety 5/10. SBA to transfer. Came out for dinner and had 50% and some drinks, on mechanical /dental soft diet,then went back to his room to lay down in bed. He did not participate in group therapy.Pt is compliant with medication.No concern this shift. Fall precaution in place.Discharge plan in progress, pending waiver to be open. Continue with plan of care.         "

## 2022-09-23 NOTE — PLAN OF CARE
09/23/22 7916   Individualization/Patient Specific Goals   Patient Personal Strengths Resilient; Improvements in cooperating with directions    Patient Vulnerabilities lacks insight into illness   Anxieties, Fears or Concerns Patient lacks insight into his illness. He is at baseline with delusions and hallucinations.   Individualized Care Needs Patient is on a commitment/pittman and guardianship has been determined. Continue to encourage food, hygiene, socializing/cominig out to the lounge, taking walks on the unit.   Patient-Specific Goals (Include Timeframe) Continue with plan for stabilization 10-15 days   Interprofessional Rounds   Summary Patient continues to be stable at baseline. Patient has delusions and hallucinations. Patient is waiting for placement. He has been accepted into [facility]. Now [place] following which he can be discharged to [facility], once the  and sunflowjennifer agree on a rate.   Participants nursing;psychiatrist;CTC;OT   Team Discussion   Participants Dr. Dk MD; FIFI Rivera, Wen Broderick RN; Radha Licona, OTR; Joi Stearns, OT.   Progress Making progress.   Anticipated length of stay 10-14 days   Continued Stay Criteria/Rationale Patient can not discharge safely. He needs assistance with all ADLs. Team is waiting on final guardianship ruling. Patient will discharge to a group home/assisted living.   Medical/Physical Please see H and P.   Plan Continue with plan: medication management, psychiatric evaluations, medical evaluations as needed, nursing assessments, milieu therapy, and CTC case management. Order for guardianship has been received. Letters of guardianship have been received. Guardian is established. Pt to discharge to [facility] once [place] and rate is agreed upon by John and YAMILET .   Anticipated Discharge Disposition Letters of guardianship received. Referrals to nursing homes are  ongoing.       PRECAUTIONS AND SAFETY               Behavioral Orders   Procedures     Code 1 - Restrict to Unit     Code 2       CT scan only     Code 3       Patient can go out of the unit with staff supervision. He needs to be taken out by him self with 2 staff and security present.     Fall precautions     Routine Programming       As clinically indicated     Status 15       Every 15 minutes.            Safety  Safety WDL: WDL  Patient Location: patient room, own  Observed Behavior: calm  Observed Behavior (Comment): laying in bed  Safety Measures: safety plan reviewed  Diversional Activity: other (see comments) (pt declined to come out of the roonm)  Suicidality: Status 15  Seizure precautions: clutter free environment  Assault: status 15  Elopement Assessment: Distress about legal situation (holds for mental health or criminal)  Elopement Interventions: status 15  Sexual: status 15, private room

## 2022-09-23 NOTE — PLAN OF CARE
"  Problem: Behavior Regulation Impairment (Psychotic Signs/Symptoms)  Goal: Improved Behavioral Control (Psychotic Signs/Symptoms)  Outcome: Met   Goal Outcome Evaluation:    Plan of Care Reviewed With: patient           Nursing Assessment    Psychosis (H) [F29]    Admit Date: 1/26/2022    Length of Stay: 240    Patient evaluation continues. Assessed mood,anxiety,thoughts and behavior. Patient is progressing towards goals. Patient is encouraged to participate in groups and assisted to develop healthy coping skills.  Patient admits to  auditory  hallucinations. /80 (BP Location: Right arm, Patient Position: Sitting)   Pulse 101   Temp 97.2  F (36.2  C) (Temporal)   Resp 18   Ht 1.778 m (5' 10\")   Wt 97.9 kg (215 lb 13.3 oz)   SpO2 96%   BMI 30.97 kg/m      Mood: good    Patient reports depression no and reports anxiety no    Affect:flat blunted    Sleep: 9 hours last night    Appetite: good    SI: denies    HI: denies    SIB: denies      Medication Compliance yes    Group participation:no    ADL's: assist of 1 staff    Fall risk interventions: proper foot wear, orthostatic B/p stand by assist    Rinku Score Interventions:none    Discharge planning in process    Refer to daily team meeting notes for individualized plan of care. Nursing will continue to assess.    *Scale is 1-10 and 10 is the worst.             "

## 2022-09-24 PROCEDURE — 124N000003 HC R&B MH SENIOR/ADOLESCENT

## 2022-09-24 PROCEDURE — 250N000013 HC RX MED GY IP 250 OP 250 PS 637: Performed by: PSYCHIATRY & NEUROLOGY

## 2022-09-24 PROCEDURE — 250N000013 HC RX MED GY IP 250 OP 250 PS 637: Performed by: PHYSICIAN ASSISTANT

## 2022-09-24 RX ADMIN — MEMANTINE 10 MG: 10 TABLET ORAL at 08:40

## 2022-09-24 RX ADMIN — GABAPENTIN 100 MG: 100 CAPSULE ORAL at 13:22

## 2022-09-24 RX ADMIN — BENZTROPINE MESYLATE 0.5 MG: 0.5 TABLET ORAL at 20:33

## 2022-09-24 RX ADMIN — Medication 5 MG: at 20:34

## 2022-09-24 RX ADMIN — ASPIRIN 81 MG: 81 TABLET, COATED ORAL at 08:41

## 2022-09-24 RX ADMIN — GABAPENTIN 100 MG: 100 CAPSULE ORAL at 08:40

## 2022-09-24 RX ADMIN — BENZTROPINE MESYLATE 0.5 MG: 0.5 TABLET ORAL at 08:41

## 2022-09-24 RX ADMIN — SALMETEROL XINAFOATE 1 PUFF: 50 POWDER, METERED ORAL; RESPIRATORY (INHALATION) at 08:41

## 2022-09-24 RX ADMIN — THERA TABS 1 TABLET: TAB at 08:40

## 2022-09-24 RX ADMIN — THIAMINE HCL TAB 100 MG 100 MG: 100 TAB at 08:41

## 2022-09-24 RX ADMIN — MIRTAZAPINE 15 MG: 15 TABLET, FILM COATED ORAL at 20:36

## 2022-09-24 RX ADMIN — BUSPIRONE HYDROCHLORIDE 30 MG: 15 TABLET ORAL at 08:40

## 2022-09-24 RX ADMIN — DONEPEZIL HYDROCHLORIDE 10 MG: 10 TABLET ORAL at 20:34

## 2022-09-24 RX ADMIN — BUSPIRONE HYDROCHLORIDE 30 MG: 15 TABLET ORAL at 20:33

## 2022-09-24 RX ADMIN — MEMANTINE 10 MG: 10 TABLET ORAL at 20:34

## 2022-09-24 RX ADMIN — GABAPENTIN 100 MG: 100 CAPSULE ORAL at 20:34

## 2022-09-24 RX ADMIN — LEVOTHYROXINE SODIUM 88 MCG: 0.09 TABLET ORAL at 08:40

## 2022-09-24 RX ADMIN — SALMETEROL XINAFOATE 1 PUFF: 50 POWDER, METERED ORAL; RESPIRATORY (INHALATION) at 20:33

## 2022-09-24 RX ADMIN — MEGESTROL ACETATE 20 MG: 20 TABLET ORAL at 08:40

## 2022-09-24 RX ADMIN — CLOZAPINE 250 MG: 100 TABLET ORAL at 20:33

## 2022-09-24 ASSESSMENT — ACTIVITIES OF DAILY LIVING (ADL)
ADLS_ACUITY_SCORE: 77

## 2022-09-24 NOTE — PLAN OF CARE
Problem: Behavioral Health Plan of Care  Goal: Plan of Care Review  9/24/2022 1352 by Sumeet Hughes RN  Outcome: Ongoing, Progressing  9/24/2022 1351 by Sumeet Hughes RN  Outcome: Ongoing, Progressing  Flowsheets (Taken 9/24/2022 1200)  Plan of Care Reviewed With: patient  Patient Agreement with Plan of Care: refuses to participate     Problem: Behavior Regulation Impairment (Psychotic Signs/Symptoms)  Goal: Improved Behavioral Control (Psychotic Signs/Symptoms)  Outcome: Ongoing, Progressing     Problem: Cognitive Impairment (Psychotic Signs/Symptoms)  Goal: Optimal Cognitive Function (Psychotic Signs/Symptoms)  Outcome: Ongoing, Progressing     Problem: Mood Impairment (Psychotic Signs/Symptoms)  Goal: Improved Mood Symptoms (Psychotic Signs/Symptoms)  Outcome: Ongoing, Progressing     Problem: Social, Occupational or Functional Impairment (Psychotic Signs/Symptoms)  Goal: Enhanced Social, Occupational or Functional Skills (Psychotic Signs/Symptoms)  Outcome: Ongoing, Progressing     Problem: Depression  Goal: Improved Mood  Outcome: Ongoing, Progressing     Problem: Suicidal Behavior  Goal: Suicidal Behavior is Absent or Managed  Outcome: Ongoing, Progressing     Problem: Mobility Impairment  Goal: Optimal Mobility [place] and Safety  Outcome: Ongoing, Progressing   Goal Outcome Evaluation:    Plan of Care Reviewed With: patient     Patient is alert and oriented to self, flat affect, isolative and withdrawn in his room. Was up to the dinning room for breakfast, good appetite, ate 100% of his meals and drinking adequately with no concerns. Patient denies any signs of SI, HI, SIB, no anxiety, no pain reported. He wasn't socializing well during assessment,  answered no to all the questions and speaks in a very low tone.  Patient is assist of one with ADLs, compliant with his medications, will continue with cares.

## 2022-09-24 NOTE — PLAN OF CARE
Problem: Sleep Disturbance  Goal: Adequate Sleep/Rest  Outcome: Ongoing, Progressing   Goal Outcome Evaluation:           Patient slept most of the night. Appears to be sleeping during safety checks. Achieved about 7 hours of sleep. Staff will continue to monitor.

## 2022-09-25 PROCEDURE — 124N000003 HC R&B MH SENIOR/ADOLESCENT

## 2022-09-25 PROCEDURE — 250N000013 HC RX MED GY IP 250 OP 250 PS 637: Performed by: PSYCHIATRY & NEUROLOGY

## 2022-09-25 PROCEDURE — 250N000013 HC RX MED GY IP 250 OP 250 PS 637: Performed by: PHYSICIAN ASSISTANT

## 2022-09-25 RX ADMIN — Medication 5 MG: at 20:24

## 2022-09-25 RX ADMIN — MEGESTROL ACETATE 20 MG: 20 TABLET ORAL at 08:43

## 2022-09-25 RX ADMIN — ASPIRIN 81 MG: 81 TABLET, COATED ORAL at 08:43

## 2022-09-25 RX ADMIN — LEVOTHYROXINE SODIUM 88 MCG: 0.09 TABLET ORAL at 08:43

## 2022-09-25 RX ADMIN — BUSPIRONE HYDROCHLORIDE 30 MG: 15 TABLET ORAL at 20:24

## 2022-09-25 RX ADMIN — CLOZAPINE 250 MG: 100 TABLET ORAL at 20:24

## 2022-09-25 RX ADMIN — SALMETEROL XINAFOATE 1 PUFF: 50 POWDER, METERED ORAL; RESPIRATORY (INHALATION) at 20:24

## 2022-09-25 RX ADMIN — MIRTAZAPINE 15 MG: 15 TABLET, FILM COATED ORAL at 20:24

## 2022-09-25 RX ADMIN — THIAMINE HCL TAB 100 MG 100 MG: 100 TAB at 08:43

## 2022-09-25 RX ADMIN — DONEPEZIL HYDROCHLORIDE 10 MG: 10 TABLET ORAL at 20:24

## 2022-09-25 RX ADMIN — GABAPENTIN 100 MG: 100 CAPSULE ORAL at 12:02

## 2022-09-25 RX ADMIN — BENZTROPINE MESYLATE 0.5 MG: 0.5 TABLET ORAL at 20:24

## 2022-09-25 RX ADMIN — MEMANTINE 10 MG: 10 TABLET ORAL at 08:43

## 2022-09-25 RX ADMIN — GABAPENTIN 100 MG: 100 CAPSULE ORAL at 08:43

## 2022-09-25 RX ADMIN — BENZTROPINE MESYLATE 0.5 MG: 0.5 TABLET ORAL at 08:43

## 2022-09-25 RX ADMIN — MEMANTINE 10 MG: 10 TABLET ORAL at 20:24

## 2022-09-25 RX ADMIN — SALMETEROL XINAFOATE 1 PUFF: 50 POWDER, METERED ORAL; RESPIRATORY (INHALATION) at 08:45

## 2022-09-25 RX ADMIN — GABAPENTIN 100 MG: 100 CAPSULE ORAL at 17:15

## 2022-09-25 RX ADMIN — BUSPIRONE HYDROCHLORIDE 30 MG: 15 TABLET ORAL at 08:43

## 2022-09-25 RX ADMIN — THERA TABS 1 TABLET: TAB at 08:43

## 2022-09-25 ASSESSMENT — ACTIVITIES OF DAILY LIVING (ADL)
ADLS_ACUITY_SCORE: 77

## 2022-09-25 NOTE — PLAN OF CARE
Problem: Behavioral Health Plan of Care  Goal: Plan of Care Review  Outcome: Ongoing, Progressing  Flowsheets (Taken 9/25/2022 1008)  Plan of Care Reviewed With: patient  Patient Agreement with Plan of Care: agrees     Problem: Behavior Regulation Impairment (Psychotic Signs/Symptoms)  Goal: Improved Behavioral Control (Psychotic Signs/Symptoms)  Outcome: Ongoing, Progressing     Problem: Cognitive Impairment (Psychotic Signs/Symptoms)  Goal: Optimal Cognitive Function (Psychotic Signs/Symptoms)  Outcome: Ongoing, Progressing  Intervention: Support and Promote Cognitive Ability  Recent Flowsheet Documentation  Taken 9/25/2022 1008 by Sumeet Hughes RN  Trust Relationship/Rapport:    questions answered    care explained    choices provided    thoughts/feelings acknowledged     Problem: Mood Impairment (Psychotic Signs/Symptoms)  Goal: Improved Mood Symptoms (Psychotic Signs/Symptoms)  Outcome: Ongoing, Progressing     Problem: Depression  Goal: Improved Mood  Outcome: Ongoing, Progressing     Problem: Mobility Impairment  Goal: Optimal Mobility [place] and Safety  Outcome: Ongoing, Progressing   Goal Outcome Evaluation:    Plan of Care Reviewed With: patient     Patient is alert and oriented to self, able to make needs known,was up to the dinning room for meals but otherwise is very isolative and withdrawn in his room, does not socialize with anyone and gives few answers to assessment questions . Patient denies feeling depressed, no signs of anxiety, SI, SIB noted  and denies pain. Continues to have a good appetite, assist of 1 with ADLs, compliant with his medications, will continue with cares.

## 2022-09-25 NOTE — PLAN OF CARE
Goal Outcome Evaluation:      Patient slept well the whole night for 6.5 hours. He only got up once to check the time and went back to sleep immediately. Nothing unusual noted. No behavioral issues observed.

## 2022-09-25 NOTE — PLAN OF CARE
"Goal Outcome Evaluation:    Plan of Care Reviewed With: patient     Patient has remained in his room only coming out for dinner. He insisted on using a wheel chair for transportation. He denies SI and SIB. He denies depression. He endorses anxiety \"from being here\". He asked repeatedly when he can go home and smoke. He states it is 2099 and Ina is the president. He believes he is in a drug rehab facility. He endorses AH stating \"I hear voices outside of my room\". He denies pain. He did not attend groups. He is not social with peers and only select staff. He ate 50% of his dinner. He refused to shower. He is medication compliant.                 "

## 2022-09-26 LAB — SARS-COV-2 RNA RESP QL NAA+PROBE: NEGATIVE

## 2022-09-26 PROCEDURE — 99231 SBSQ HOSP IP/OBS SF/LOW 25: CPT | Performed by: PSYCHIATRY & NEUROLOGY

## 2022-09-26 PROCEDURE — 124N000003 HC R&B MH SENIOR/ADOLESCENT

## 2022-09-26 PROCEDURE — 250N000013 HC RX MED GY IP 250 OP 250 PS 637: Performed by: PSYCHIATRY & NEUROLOGY

## 2022-09-26 PROCEDURE — U0005 INFEC AGEN DETEC AMPLI PROBE: HCPCS | Performed by: PSYCHIATRY & NEUROLOGY

## 2022-09-26 PROCEDURE — 250N000013 HC RX MED GY IP 250 OP 250 PS 637: Performed by: PHYSICIAN ASSISTANT

## 2022-09-26 RX ADMIN — MEMANTINE 10 MG: 10 TABLET ORAL at 19:35

## 2022-09-26 RX ADMIN — BUSPIRONE HYDROCHLORIDE 30 MG: 15 TABLET ORAL at 08:05

## 2022-09-26 RX ADMIN — GABAPENTIN 100 MG: 100 CAPSULE ORAL at 08:06

## 2022-09-26 RX ADMIN — MEMANTINE 10 MG: 10 TABLET ORAL at 08:06

## 2022-09-26 RX ADMIN — THERA TABS 1 TABLET: TAB at 08:05

## 2022-09-26 RX ADMIN — LEVOTHYROXINE SODIUM 88 MCG: 0.09 TABLET ORAL at 08:06

## 2022-09-26 RX ADMIN — BENZTROPINE MESYLATE 0.5 MG: 0.5 TABLET ORAL at 19:35

## 2022-09-26 RX ADMIN — GABAPENTIN 100 MG: 100 CAPSULE ORAL at 12:31

## 2022-09-26 RX ADMIN — DONEPEZIL HYDROCHLORIDE 10 MG: 10 TABLET ORAL at 19:35

## 2022-09-26 RX ADMIN — SALMETEROL XINAFOATE 1 PUFF: 50 POWDER, METERED ORAL; RESPIRATORY (INHALATION) at 08:07

## 2022-09-26 RX ADMIN — THIAMINE HCL TAB 100 MG 100 MG: 100 TAB at 08:06

## 2022-09-26 RX ADMIN — GABAPENTIN 100 MG: 100 CAPSULE ORAL at 17:53

## 2022-09-26 RX ADMIN — BENZTROPINE MESYLATE 0.5 MG: 0.5 TABLET ORAL at 08:11

## 2022-09-26 RX ADMIN — CLOZAPINE 250 MG: 100 TABLET ORAL at 19:35

## 2022-09-26 RX ADMIN — MEGESTROL ACETATE 20 MG: 20 TABLET ORAL at 08:05

## 2022-09-26 RX ADMIN — BUSPIRONE HYDROCHLORIDE 30 MG: 15 TABLET ORAL at 19:35

## 2022-09-26 RX ADMIN — MIRTAZAPINE 15 MG: 15 TABLET, FILM COATED ORAL at 19:35

## 2022-09-26 RX ADMIN — Medication 5 MG: at 19:35

## 2022-09-26 RX ADMIN — ASPIRIN 81 MG: 81 TABLET, COATED ORAL at 08:05

## 2022-09-26 RX ADMIN — SALMETEROL XINAFOATE 1 PUFF: 50 POWDER, METERED ORAL; RESPIRATORY (INHALATION) at 19:36

## 2022-09-26 ASSESSMENT — ACTIVITIES OF DAILY LIVING (ADL)
ADLS_ACUITY_SCORE: 77

## 2022-09-26 NOTE — PLAN OF CARE
"  Problem: Behavioral Health Plan of Care  Goal: Plan of Care Review  Outcome: Ongoing, Progressing   Goal Outcome Evaluation:  Patient remains isolative/withdrawn to his room. He avoids social contact with peers and staff. He came out for both breakfast and lunch and ate adequately.  Patient did not go to groups. Patient presents with a depressed, calm mood. Affect is flat and blunted. Patient denies SI/SIB/HI. He endorsed AH all stating he hears voices most of the time.  He denies anxiety. He reported being depressed for still being here. Patient was incontinent of urine. His bed lines were changed by this writer while another staff helped patient with bed bath. There's a patient care order to change lines every 3 days and document. Patient had Covid test done this morning and the result came back negative./66   Pulse 97   Temp 98.2  F (36.8  C) (Temporal)   Resp 16   Ht 1.778 m (5' 10\")   Wt 97.9 kg (215 lb 13.3 oz)   SpO2 97%   BMI 30.97 kg/m          "

## 2022-09-26 NOTE — PROGRESS NOTES
"PSYCHIATRY  PROGRESS NOTE     DATE OF SERVICE   09/26/2022       CHIEF COMPLAINT   Patient was admitted due to inability to safely care for himself and psychosis.       SUBJECTIVE   Nursing reports:  Patient spent the shift in his room only coming out for dinner. He ate 100%. He has good fluid intake. His affect is flat but brightens upon approach. His mood is calm. He denies SI and SIB. He endorses AH stating \"always\". He endorses depression and anxiety \"from being here\". He still believes the year is 2099 and he is in a treatment center. He denies pain. He did not attend groups. He is not social with peers and only select staff. He gait is unsteady at times. He is disheveled and unkept. He refused to shower stating \"I'm too tired\".      Patient has been discussed in detail with the  during team meeting.  There has been no updates on patient's discharge.     OBJECTIVE   Patient was seen and evaluated at bedside by himself, this was a face-to-face evaluation.  Patient remains at baseline and no new behaviors have been reported.  He has been calm, pleasant and cooperative.  Has continued to take all of his medications.       MEDICATIONS   Medications:  Scheduled Meds:    aspirin  81 mg Oral Daily     benztropine  0.5 mg Oral BID     busPIRone HCl  30 mg Oral BID     cloZAPine  250 mg Oral At Bedtime     donepezil  10 mg Oral At Bedtime     gabapentin  100 mg Oral TID w/meals     levothyroxine  88 mcg Oral Daily     megestrol  20 mg Oral Daily     melatonin  5 mg Oral At Bedtime     memantine  10 mg Oral BID     mirtazapine  15 mg Oral At Bedtime     multivitamin, therapeutic  1 tablet Oral Daily     salmeterol  1 puff Inhalation BID     thiamine  100 mg Oral Daily     Continuous Infusions:  PRN Meds:.albuterol, alum & mag hydroxide-simethicone, hydrOXYzine, nystatin, polyethylene glycol, senna-docusate    Medication adherence issues: MS Med Adherence Y/N: Yes, Hospitalization  Medication side effects: " "MEDICATION SIDE EFFECTS: no side effects reported  Benefit: Yes / No: Yes       ROS   A comprehensive review of systems was negative.       MENTAL STATUS EXAM   Vitals: /66   Pulse 97   Temp 98.2  F (36.8  C) (Temporal)   Resp 16   Ht 1.778 m (5' 10\")   Wt 97.9 kg (215 lb 13.3 oz)   SpO2 97%   BMI 30.97 kg/m       Appearance:  No apparent distress  Mood: \"I am tired  Affect: Calm  Suicidal Ideation: Denies  Homicidal Ideation: Denies  Thought process: Disorganized and concrete  Thought content: Remains confused and delusional and hallucinating at times.    Fund of Knowledge: Below average  Attention/Concentration: Poor  Language ability: Significantly impaired  Memory: Global memory impairment  Insight:  Poor.  Judgement: Poor  Orientation: Only to person  Psychomotor Behavior: slowed    Muscle Strength and Tone: MuscleStrength: Normal and Atrophy  Gait and Station: Not evaluated       LABS   personally reviewed.     No results found for: PHENYTOIN, PHENOBARB, VALPROATE, CBMZ       DIAGNOSIS   Principal Problem:    Major neurocognitive disorder, due to multiple etiologies, with behavioral disturbance, severe    Active Problem List:  Patient Active Problem List   Diagnosis     TBI with aggressive behavior     HTN (hypertension)     COPD (chronic obstructive pulmonary disease) (H)     Dementia with behavioral disturbance (H)     Chronic schizophrenia (H)     Smoker     Agitation     Behavior disturbance     Psychosis (H)     Major neurocognitive disorder, due to multiple etiologies, with behavioral disturbance, severe (H)     Paranoid schizophrenia, chronic condition with acute exacerbation (H)     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 Ongoing education given regarding diagnostic and treatment options with risks, benefits and " alternatives and adequate verbalization of understanding.  2.  Medications       BuSpar 30 mg 2 times daily       Cogentin 0.5 mg 2 times a day       Clozaril 250 mg at bedtime       Aricept 10 mg at bedtime       Gabapentin 100 mg 3 times a day       Namenda 10 mg 2 times a day       Remeron 15 mg at bedtime       melatonin 5 mg at bedtime  3.  Medical team following the patient   4.   coordinating a safe discharge plan with the patient.  5.  Labs have been ordered, liver functions remained stable consistent with the hep C diagnosis.      Risk Assessment: [facility] RISK ASSESSMENT: Patient able to contract for safety    Coordination of Care:   Treatment Plan reviewed and physician signed, Care discussed with Care/Treatment Team Members, Chart reviewed and Patient seen      Re-Certification I certify that the inpatient psychiatric facility services furnished since the previous certification were, and continue to be, medically necessary for, either, treatment which could reasonably be expected to improve the patient s condition or diagnostic study and that the hospital records indicate that the services furnished were, either, intensive treatment services, admission and related services necessary for diagnostic study, or equivalent services.     I certify that the patient continues to need, on a daily basis, active treatment furnished directly by or requiring the supervision of inpatient psychiatric facility personnel.   I estimate 14 days of hospitalization is necessary for proper treatment of the patient. My plans for post-hospital care for this patient are  TBD     Ginger Dubon MD    -     09/26/2022  -     12:55 PM    Total time 15 minutes with > 50%spent on coordination of cares and psycho-education.    This note was created with help of Dragon dictation system. Grammatical / typing errors are not intentional.    Ginger Dubon MD

## 2022-09-26 NOTE — PLAN OF CARE
Goal Outcome Evaluation:      Patient slept comfortably for 10.75   hours the whole night. No behavioral issues noted. Nothing unusual noted.

## 2022-09-26 NOTE — PLAN OF CARE
Problem: Cognitive Impairment (Psychotic Signs/Symptoms)  Goal: Optimal Cognitive Function (Psychotic Signs/Symptoms)  Intervention: Support and Promote Cognitive Ability  Recent Flowsheet Documentation  Taken 9/26/2022 1659 by Izzy Gallego RN  Trust Relationship/Rapport:   care explained   choices provided   emotional support provided   empathic listening provided   questions answered   questions encouraged   reassurance provided   thoughts/feelings acknowledged   Goal Outcome Evaluation:     Plan of Care Reviewed With: patient       Continued with plan of care. No new medical or psychiatric concerns rose shift.   Patent status: pending placement.

## 2022-09-26 NOTE — PLAN OF CARE
"Assessment/Intervention/Current Symptoms and Care Coordination:     -Reviewed pt s chart  -Rounded with team in review of pt's care and plan  -Has been accepted by [facility]           [place] [employer] staff,   sent the following message today:  \"[employer] assessment is scheduled for 10/5 @ 11am. Just to clarify waiver funding and waiver slot cannot be requested until the [employer] has occurred and [place] and I agree to service needs.\"   [employer]  Information: [email or web address]              Discharge Plan or Goal:   Has been accepted into [facility]. Awaiting [place] [facility] assessment on 10/5/2022      Barriers to Discharge:  - Funding  -[place] [employer] to be expeditiously done     Referral Status:  - [facility]  - [facility]     Legal Status:  Court Hold     Contact:  Joi Roque  [facility]   [facility]  Phone: [phone]    Fax: [phone]       "

## 2022-09-26 NOTE — PLAN OF CARE
"Goal Outcome Evaluation:    Plan of Care Reviewed With: spouse     Patient spent the shift in his room only coming out for dinner. He ate 100%. He has good fluid intake. His affect is flat but brightens upon approach. His mood is calm. He denies SI and SIB. He endorses AH stating \"always\". He endorses depression and anxiety \"from being here\". He still believes the year is 2099 and he is in a treatment center. He denies pain. He did not attend groups. He is not social with peers and only select staff. He gait is unsteady at times. He is disheveled and unkept. He refused to shower stating \"I'm too tired\".                 "

## 2022-09-27 PROCEDURE — 250N000013 HC RX MED GY IP 250 OP 250 PS 637: Performed by: PSYCHIATRY & NEUROLOGY

## 2022-09-27 PROCEDURE — 124N000003 HC R&B MH SENIOR/ADOLESCENT

## 2022-09-27 PROCEDURE — 99231 SBSQ HOSP IP/OBS SF/LOW 25: CPT | Performed by: PSYCHIATRY & NEUROLOGY

## 2022-09-27 PROCEDURE — 250N000013 HC RX MED GY IP 250 OP 250 PS 637: Performed by: PHYSICIAN ASSISTANT

## 2022-09-27 RX ADMIN — SALMETEROL XINAFOATE 1 PUFF: 50 POWDER, METERED ORAL; RESPIRATORY (INHALATION) at 20:27

## 2022-09-27 RX ADMIN — MEMANTINE 10 MG: 10 TABLET ORAL at 20:27

## 2022-09-27 RX ADMIN — Medication 5 MG: at 20:27

## 2022-09-27 RX ADMIN — BUSPIRONE HYDROCHLORIDE 30 MG: 15 TABLET ORAL at 20:27

## 2022-09-27 RX ADMIN — BUSPIRONE HYDROCHLORIDE 30 MG: 15 TABLET ORAL at 08:17

## 2022-09-27 RX ADMIN — LEVOTHYROXINE SODIUM 88 MCG: 0.09 TABLET ORAL at 08:17

## 2022-09-27 RX ADMIN — GABAPENTIN 100 MG: 100 CAPSULE ORAL at 17:45

## 2022-09-27 RX ADMIN — MEGESTROL ACETATE 20 MG: 20 TABLET ORAL at 08:17

## 2022-09-27 RX ADMIN — BENZTROPINE MESYLATE 0.5 MG: 0.5 TABLET ORAL at 20:27

## 2022-09-27 RX ADMIN — MIRTAZAPINE 15 MG: 15 TABLET, FILM COATED ORAL at 20:27

## 2022-09-27 RX ADMIN — DONEPEZIL HYDROCHLORIDE 10 MG: 10 TABLET ORAL at 20:27

## 2022-09-27 RX ADMIN — THIAMINE HCL TAB 100 MG 100 MG: 100 TAB at 08:17

## 2022-09-27 RX ADMIN — THERA TABS 1 TABLET: TAB at 08:17

## 2022-09-27 RX ADMIN — CLOZAPINE 250 MG: 100 TABLET ORAL at 20:26

## 2022-09-27 RX ADMIN — GABAPENTIN 100 MG: 100 CAPSULE ORAL at 08:17

## 2022-09-27 RX ADMIN — BENZTROPINE MESYLATE 0.5 MG: 0.5 TABLET ORAL at 08:17

## 2022-09-27 RX ADMIN — ASPIRIN 81 MG: 81 TABLET, COATED ORAL at 08:17

## 2022-09-27 RX ADMIN — SALMETEROL XINAFOATE 1 PUFF: 50 POWDER, METERED ORAL; RESPIRATORY (INHALATION) at 08:18

## 2022-09-27 RX ADMIN — MEMANTINE 10 MG: 10 TABLET ORAL at 08:17

## 2022-09-27 RX ADMIN — GABAPENTIN 100 MG: 100 CAPSULE ORAL at 12:17

## 2022-09-27 ASSESSMENT — ACTIVITIES OF DAILY LIVING (ADL)
ADLS_ACUITY_SCORE: 77

## 2022-09-27 NOTE — PLAN OF CARE
Problem: Cognitive Impairment (Psychotic Signs/Symptoms)  Goal: Optimal Cognitive Function (Psychotic Signs/Symptoms)  Outcome: Ongoing, Progressing  Flowsheets (Taken 9/27/2022 1235)  Mutually Determined Action Steps (Optimal Cognitive Function): follows step-by-step instructions   Goal Outcome Evaluation:     Plan of Care Reviewed With: patient              Continued with plan of care. No changes in patient's baseline medical or psychiatric condition this shift. No new concerns. Status - pending placement.

## 2022-09-27 NOTE — PROGRESS NOTES
"PSYCHIATRY  PROGRESS NOTE     DATE OF SERVICE   09/27/2022       CHIEF COMPLAINT   Patient was admitted due to inability to safely care for himself and psychosis.       SUBJECTIVE   Nursing reports:  Patient remains stable and no new behaviors have been reported.     Patient has been discussed in detail with the  during team meeting.  There has been no updates on patient's discharge.     OBJECTIVE   Patient was seen and evaluated at bedside by himself, this was a face-to-face evaluation.  Patient reported that he is doing all right and denied any other issues.  Continues to isolate in his room and comes out only for meals.  Remains delusional and hallucinating at times.       MEDICATIONS   Medications:  Scheduled Meds:    aspirin  81 mg Oral Daily     benztropine  0.5 mg Oral BID     busPIRone HCl  30 mg Oral BID     cloZAPine  250 mg Oral At Bedtime     donepezil  10 mg Oral At Bedtime     gabapentin  100 mg Oral TID w/meals     levothyroxine  88 mcg Oral Daily     megestrol  20 mg Oral Daily     melatonin  5 mg Oral At Bedtime     memantine  10 mg Oral BID     mirtazapine  15 mg Oral At Bedtime     multivitamin, therapeutic  1 tablet Oral Daily     salmeterol  1 puff Inhalation BID     thiamine  100 mg Oral Daily     Continuous Infusions:  PRN Meds:.albuterol, alum & mag hydroxide-simethicone, hydrOXYzine, nystatin, polyethylene glycol, senna-docusate    Medication adherence issues: MS Med Adherence Y/N: Yes, Hospitalization  Medication side effects: MEDICATION SIDE EFFECTS: no side effects reported  Benefit: Yes / No: Yes       ROS   A comprehensive review of systems was negative.       MENTAL STATUS EXAM   Vitals: /75 (Patient Position: Sitting)   Pulse 92   Temp 97.5  F (36.4  C) (Temporal)   Resp 16   Ht 1.778 m (5' 10\")   Wt 97.9 kg (215 lb 13.3 oz)   SpO2 98%   BMI 30.97 kg/m       Appearance:  No apparent distress  Mood: \"I am alright\".  Affect: Calm  Suicidal Ideation: " Denies  Homicidal Ideation: Denies  Thought process: Disorganized and concrete  Thought content: Remains confused and delusional and hallucinating at times.    Fund of Knowledge: Below average  Attention/Concentration: Poor  Language ability: Significantly impaired  Memory: Global memory impairment  Insight:  Poor.  Judgement: Poor  Orientation: Only to person  Psychomotor Behavior: slowed    Muscle Strength and Tone: MuscleStrength: Normal and Atrophy  Gait and Station: Not evaluated       LABS   personally reviewed.     No results found for: PHENYTOIN, PHENOBARB, VALPROATE, CBMZ       DIAGNOSIS   Principal Problem:    Major neurocognitive disorder, due to multiple etiologies, with behavioral disturbance, severe    Active Problem List:  Patient Active Problem List   Diagnosis     TBI with aggressive behavior     HTN (hypertension)     COPD (chronic obstructive pulmonary disease) (H)     Dementia with behavioral disturbance (H)     Chronic schizophrenia (H)     Smoker     Agitation     Behavior disturbance     Psychosis (H)     Major neurocognitive disorder, due to multiple etiologies, with behavioral disturbance, severe (H)     Paranoid schizophrenia, chronic condition with acute exacerbation (H)     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 Ongoing education given regarding diagnostic and treatment options with risks, benefits and alternatives and adequate verbalization of understanding.  2.  Medications       BuSpar 30 mg 2 times daily       Cogentin 0.5 mg 2 times a day       Clozaril 250 mg at bedtime       Aricept 10 mg at bedtime       Gabapentin 100 mg 3 times a day       Namenda 10 mg 2 times a day       Remeron 15 mg at bedtime       melatonin 5 mg at bedtime  3.  Medical team following the patient   4.   coordinating a  safe discharge plan with the patient.  5.  Labs have been ordered, liver functions remained stable consistent with the hep C diagnosis.      Risk Assessment: [facility] RISK ASSESSMENT: Patient able to contract for safety    Coordination of Care:   Treatment Plan reviewed and physician signed, Care discussed with Care/Treatment Team Members, Chart reviewed and Patient seen      Re-Certification I certify that the inpatient psychiatric facility services furnished since the previous certification were, and continue to be, medically necessary for, either, treatment which could reasonably be expected to improve the patient s condition or diagnostic study and that the hospital records indicate that the services furnished were, either, intensive treatment services, admission and related services necessary for diagnostic study, or equivalent services.     I certify that the patient continues to need, on a daily basis, active treatment furnished directly by or requiring the supervision of inpatient psychiatric facility personnel.   I estimate 14 days of hospitalization is necessary for proper treatment of the patient. My plans for post-hospital care for this patient are  TBD     Ginger Dubon MD    -     09/27/2022  -     12:29 PM    Total time 15 minutes with > 50%spent on coordination of cares and psycho-education.    This note was created with help of Dragon dictation system. Grammatical / typing errors are not intentional.    Ginger Dubon MD

## 2022-09-27 NOTE — PLAN OF CARE
Goal Outcome Evaluation:      Patient slept well the whole night for 11.25 hours. Nothing unusual noted. No complaints made.

## 2022-09-28 PROCEDURE — 99231 SBSQ HOSP IP/OBS SF/LOW 25: CPT | Performed by: PSYCHIATRY & NEUROLOGY

## 2022-09-28 PROCEDURE — 250N000013 HC RX MED GY IP 250 OP 250 PS 637: Performed by: PHYSICIAN ASSISTANT

## 2022-09-28 PROCEDURE — 250N000013 HC RX MED GY IP 250 OP 250 PS 637: Performed by: PSYCHIATRY & NEUROLOGY

## 2022-09-28 PROCEDURE — 124N000003 HC R&B MH SENIOR/ADOLESCENT

## 2022-09-28 RX ADMIN — BUSPIRONE HYDROCHLORIDE 30 MG: 15 TABLET ORAL at 20:14

## 2022-09-28 RX ADMIN — DONEPEZIL HYDROCHLORIDE 10 MG: 10 TABLET ORAL at 20:14

## 2022-09-28 RX ADMIN — SALMETEROL XINAFOATE 1 PUFF: 50 POWDER, METERED ORAL; RESPIRATORY (INHALATION) at 08:03

## 2022-09-28 RX ADMIN — THERA TABS 1 TABLET: TAB at 08:03

## 2022-09-28 RX ADMIN — SALMETEROL XINAFOATE 1 PUFF: 50 POWDER, METERED ORAL; RESPIRATORY (INHALATION) at 20:14

## 2022-09-28 RX ADMIN — BENZTROPINE MESYLATE 0.5 MG: 0.5 TABLET ORAL at 20:14

## 2022-09-28 RX ADMIN — ASPIRIN 81 MG: 81 TABLET, COATED ORAL at 08:03

## 2022-09-28 RX ADMIN — MEMANTINE 10 MG: 10 TABLET ORAL at 08:03

## 2022-09-28 RX ADMIN — LEVOTHYROXINE SODIUM 88 MCG: 0.09 TABLET ORAL at 08:03

## 2022-09-28 RX ADMIN — GABAPENTIN 100 MG: 100 CAPSULE ORAL at 12:05

## 2022-09-28 RX ADMIN — GABAPENTIN 100 MG: 100 CAPSULE ORAL at 08:03

## 2022-09-28 RX ADMIN — Medication 5 MG: at 20:14

## 2022-09-28 RX ADMIN — THIAMINE HCL TAB 100 MG 100 MG: 100 TAB at 08:03

## 2022-09-28 RX ADMIN — GABAPENTIN 100 MG: 100 CAPSULE ORAL at 17:24

## 2022-09-28 RX ADMIN — CLOZAPINE 250 MG: 100 TABLET ORAL at 20:14

## 2022-09-28 RX ADMIN — MIRTAZAPINE 15 MG: 15 TABLET, FILM COATED ORAL at 20:14

## 2022-09-28 RX ADMIN — BUSPIRONE HYDROCHLORIDE 30 MG: 15 TABLET ORAL at 08:03

## 2022-09-28 RX ADMIN — MEGESTROL ACETATE 20 MG: 20 TABLET ORAL at 08:03

## 2022-09-28 RX ADMIN — BENZTROPINE MESYLATE 0.5 MG: 0.5 TABLET ORAL at 08:03

## 2022-09-28 RX ADMIN — MEMANTINE 10 MG: 10 TABLET ORAL at 20:14

## 2022-09-28 ASSESSMENT — ACTIVITIES OF DAILY LIVING (ADL)
ADLS_ACUITY_SCORE: 77

## 2022-09-28 NOTE — PLAN OF CARE
"  Problem: Cognitive Impairment (Psychotic Signs/Symptoms)  Goal: Optimal Cognitive Function (Psychotic Signs/Symptoms)  Outcome: Ongoing, Progressing     Problem: Mood Impairment (Psychotic Signs/Symptoms)  Goal: Improved Mood Symptoms (Psychotic Signs/Symptoms)  Outcome: Ongoing, Progressing     Problem: Fall Injury Risk  Goal: Absence of Fall and Fall-Related Injury  Intervention: Identify and Manage Contributors  Recent Flowsheet Documentation  Taken 9/27/2022 1819 by Cynthia Patrick, RN  Medication Review/Management: medications reviewed   Goal Outcome Evaluation:     /82   Pulse 92   Temp 97.3  F (36.3  C) (Oral)   Resp 16   Ht 1.778 m (5' 10\")   Wt 97.9 kg (215 lb 13.3 oz)   SpO2 99%   BMI 30.97 kg/m   Pt alert. Denies SI/SIB,AH/VH,Anxiety and pain. Admitted 3/10 depression about not been able to go home.Pt avoided social interactions, flat,blunted,Isolative and withdrawn to his all shift. Refused to come out for dinner,snack and group therapy. Dinner was taken to the room, eat  75% of dinner. Medication compliment.Continue with plan of care, No concern, fall and latex precaution maintained.                "

## 2022-09-28 NOTE — PLAN OF CARE
Problem: Behavioral Health Plan of Care  Goal: Plan of Care Review  Outcome: Ongoing, Progressing  Flowsheets (Taken 9/28/2022 1009)  Plan of Care Reviewed With: patient  Patient Agreement with Plan of Care: agrees   Goal Outcome Evaluation:    Plan of Care Reviewed With: patient     Patient is calm, medication compliant. Affect is flat and blunted, alert to self. Isolative and withdrawn, did not attend groups or socialize with peers. Denies any MH concerns this shift. Fair appetite, ate 100% of breakfast and 25% of lunch.

## 2022-09-28 NOTE — PROGRESS NOTES
Care Management Follow Up    Length of Stay (days): 282    Expected Discharge Date: 07/12/21     Concerns to be Addressed: patient refuses services, discharge planning     Patient plan of care discussed at interdisciplinary rounds: Yes    Anticipated Discharge Disposition: (residential care home or nursing home)     Anticipated Discharge Services: None  Anticipated Discharge DME: None    Patient/family educated on Medicare website which has current facility and service quality ratings: (not applicable)  Education Provided on the Discharge Plan:    Patient/Family in Agreement with the Plan: no    Referrals Placed by CM/SW: Post Acute Facilities  Private pay costs discussed: Not applicable    Additional Information:  Amendment Request to current [facility] was faxed to Belen Ross on 6/22 and she forwarded the request to patient's .  She has not heard back from patient's  but if she doesn't hear from the , she plans to call them tomorrow and will update writer.     In regards to psychiatric transfer, [facility] will not accept patient for transfer. Their note can be found by clicking onto the Encounter Tab.  Lulu Contreras, ASIMSW       LV-6/08/22  NV-11/8/22  LL-6/8/22

## 2022-09-28 NOTE — PROGRESS NOTES
"PSYCHIATRY  PROGRESS NOTE     DATE OF SERVICE   09/28/2022       CHIEF COMPLAINT   Patient was admitted due to inability to safely care for himself and psychosis.       SUBJECTIVE   Nursing reports:  /82   Pulse 92   Temp 97.3  F (36.3  C) (Oral)   Resp 16   Ht 1.778 m (5' 10\")   Wt 97.9 kg (215 lb 13.3 oz)   SpO2 99%   BMI 30.97 kg/m   Pt alert. Denies SI/SIB,AH/VH,Anxiety and pain. Admitted 3/10 depression about not been able to go home.Pt avoided social interactions, flat,blunted,Isolative and withdrawn to his all shift. Refused to come out for dinner,snack and group therapy. Dinner was taken to the room, eat  75% of dinner. Medication compliment.Continue with plan of care, No concern, fall and latex precaution maintained.     Patient has been discussed in detail with the  during team meeting.  There has been no updates on patient's discharge.     OBJECTIVE   Patient was seen and evaluated at bedside by himself, this was a face-to-face evaluation.  Patient continues to be the same, isolating himself in his room and not interacting with his peers or the staff.  Continues to report that he is doing \"all right\".  Remains delusional and hallucinating at times.  Denies any thoughts of harming himself and has been able to contract for safety.       MEDICATIONS   Medications:  Scheduled Meds:    aspirin  81 mg Oral Daily     benztropine  0.5 mg Oral BID     busPIRone HCl  30 mg Oral BID     cloZAPine  250 mg Oral At Bedtime     donepezil  10 mg Oral At Bedtime     gabapentin  100 mg Oral TID w/meals     levothyroxine  88 mcg Oral Daily     megestrol  20 mg Oral Daily     melatonin  5 mg Oral At Bedtime     memantine  10 mg Oral BID     mirtazapine  15 mg Oral At Bedtime     multivitamin, therapeutic  1 tablet Oral Daily     salmeterol  1 puff Inhalation BID     thiamine  100 mg Oral Daily     Continuous Infusions:  PRN Meds:.albuterol, alum & mag hydroxide-simethicone, hydrOXYzine, nystatin, " "polyethylene glycol, senna-docusate    Medication adherence issues: MS Med Adherence Y/N: Yes, Hospitalization  Medication side effects: MEDICATION SIDE EFFECTS: no side effects reported  Benefit: Yes / No: Yes       ROS   A comprehensive review of systems was negative.       MENTAL STATUS EXAM   Vitals: /84   Pulse 84   Temp 97  F (36.1  C) (Temporal)   Resp 16   Ht 1.778 m (5' 10\")   Wt 97.9 kg (215 lb 13.3 oz)   SpO2 98%   BMI 30.97 kg/m       Same as last visit  Appearance:  No apparent distress  Mood: \"I am alright\".  Affect: Calm  Suicidal Ideation: Denies  Homicidal Ideation: Denies  Thought process: Disorganized and concrete  Thought content: Remains confused and delusional and hallucinating at times.    Fund of Knowledge: Below average  Attention/Concentration: Poor  Language ability: Significantly impaired  Memory: Global memory impairment  Insight:  Poor.  Judgement: Poor  Orientation: Only to person  Psychomotor Behavior: slowed    Muscle Strength and Tone: MuscleStrength: Normal and Atrophy  Gait and Station: Not evaluated       LABS   personally reviewed.     No results found for: PHENYTOIN, PHENOBARB, VALPROATE, CBMZ       DIAGNOSIS   Principal Problem:    Major neurocognitive disorder, due to multiple etiologies, with behavioral disturbance, severe    Active Problem List:  Patient Active Problem List   Diagnosis     TBI with aggressive behavior     HTN (hypertension)     COPD (chronic obstructive pulmonary disease) (H)     Dementia with behavioral disturbance (H)     Chronic schizophrenia (H)     Smoker     Agitation     Behavior disturbance     Psychosis (H)     Major neurocognitive disorder, due to multiple etiologies, with behavioral disturbance, severe (H)     Paranoid schizophrenia, chronic condition with acute exacerbation (H)     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 Ongoing education given regarding diagnostic and treatment options with risks, benefits and alternatives and adequate verbalization of understanding.  2.  Medications       BuSpar 30 mg 2 times daily       Cogentin 0.5 mg 2 times a day       Clozaril 250 mg at bedtime       Aricept 10 mg at bedtime       Gabapentin 100 mg 3 times a day       Namenda 10 mg 2 times a day       Remeron 15 mg at bedtime       melatonin 5 mg at bedtime  3.  Medical team following the patient   4.   coordinating a safe discharge plan with the patient.  5.  Labs have been ordered, liver functions remained stable consistent with the hep C diagnosis.      Risk Assessment: [facility] RISK ASSESSMENT: Patient able to contract for safety    Coordination of Care:   Treatment Plan reviewed and physician signed, Care discussed with Care/Treatment Team Members, Chart reviewed and Patient seen      Re-Certification I certify that the inpatient psychiatric facility services furnished since the previous certification were, and continue to be, medically necessary for, either, treatment which could reasonably be expected to improve the patient s condition or diagnostic study and that the hospital records indicate that the services furnished were, either, intensive treatment services, admission and related services necessary for diagnostic study, or equivalent services.     I certify that the patient continues to need, on a daily basis, active treatment furnished directly by or requiring the supervision of inpatient psychiatric facility personnel.   I estimate 14 days of hospitalization is necessary for proper treatment of the patient. My plans for post-hospital care for this patient are  TBD     Ginger Dubon MD    -     09/28/2022  -     11:31 AM    Total time 15 minutes with > 50%spent on coordination of cares and psycho-education.    This note was created with  help of Dragon dictation system. Grammatical / typing errors are not intentional.    Ginger Dubon MD

## 2022-09-28 NOTE — PLAN OF CARE
"Assessment/Intervention/Current Symptoms and Care Coordination:     -Reviewed pt s chart  -Rounded with team in review of pt's care and plan  -Has been accepted by [facility]           [place] staff,   sent the following message today:  \"[place] for 10/5 @ 11am. Just to clarify waiver funding and waiver slot cannot be requested until the [place] has occurred and [place] and I agree to service needs.\"   [place]  Information: [email or web address]              Discharge Plan or Goal:   Has been accepted into [facility]. Awaiting [place] on 10/5/2022      Barriers to Discharge:  - Funding  -[place] to [place] expeditiously done     Referral Status:  - [facility]  - [facility]     Legal Status:  Court Hold     Contact:  Joi Roque  [facility]   [facility]  Phone: [phone]    Fax: [phone]       "

## 2022-09-29 PROCEDURE — 250N000013 HC RX MED GY IP 250 OP 250 PS 637: Performed by: PSYCHIATRY & NEUROLOGY

## 2022-09-29 PROCEDURE — 124N000003 HC R&B MH SENIOR/ADOLESCENT

## 2022-09-29 PROCEDURE — 250N000013 HC RX MED GY IP 250 OP 250 PS 637: Performed by: PHYSICIAN ASSISTANT

## 2022-09-29 RX ADMIN — SALMETEROL XINAFOATE 1 PUFF: 50 POWDER, METERED ORAL; RESPIRATORY (INHALATION) at 10:11

## 2022-09-29 RX ADMIN — BUSPIRONE HYDROCHLORIDE 30 MG: 15 TABLET ORAL at 20:27

## 2022-09-29 RX ADMIN — LEVOTHYROXINE SODIUM 88 MCG: 0.09 TABLET ORAL at 10:09

## 2022-09-29 RX ADMIN — CLOZAPINE 250 MG: 100 TABLET ORAL at 20:27

## 2022-09-29 RX ADMIN — Medication 5 MG: at 20:27

## 2022-09-29 RX ADMIN — GABAPENTIN 100 MG: 100 CAPSULE ORAL at 18:30

## 2022-09-29 RX ADMIN — THERA TABS 1 TABLET: TAB at 10:07

## 2022-09-29 RX ADMIN — MEMANTINE 10 MG: 10 TABLET ORAL at 20:27

## 2022-09-29 RX ADMIN — GABAPENTIN 100 MG: 100 CAPSULE ORAL at 10:07

## 2022-09-29 RX ADMIN — THIAMINE HCL TAB 100 MG 100 MG: 100 TAB at 10:08

## 2022-09-29 RX ADMIN — MEGESTROL ACETATE 20 MG: 20 TABLET ORAL at 10:07

## 2022-09-29 RX ADMIN — BUSPIRONE HYDROCHLORIDE 30 MG: 15 TABLET ORAL at 10:07

## 2022-09-29 RX ADMIN — BENZTROPINE MESYLATE 0.5 MG: 0.5 TABLET ORAL at 10:08

## 2022-09-29 RX ADMIN — MIRTAZAPINE 15 MG: 15 TABLET, FILM COATED ORAL at 20:28

## 2022-09-29 RX ADMIN — GABAPENTIN 100 MG: 100 CAPSULE ORAL at 13:14

## 2022-09-29 RX ADMIN — BENZTROPINE MESYLATE 0.5 MG: 0.5 TABLET ORAL at 20:27

## 2022-09-29 RX ADMIN — DONEPEZIL HYDROCHLORIDE 10 MG: 10 TABLET ORAL at 20:27

## 2022-09-29 RX ADMIN — SALMETEROL XINAFOATE 1 PUFF: 50 POWDER, METERED ORAL; RESPIRATORY (INHALATION) at 20:27

## 2022-09-29 RX ADMIN — MEMANTINE 10 MG: 10 TABLET ORAL at 10:08

## 2022-09-29 RX ADMIN — ASPIRIN 81 MG: 81 TABLET, COATED ORAL at 10:08

## 2022-09-29 ASSESSMENT — ACTIVITIES OF DAILY LIVING (ADL)
ADLS_ACUITY_SCORE: 77

## 2022-09-29 NOTE — PLAN OF CARE
"Assessment/Intervention/Current Symptoms and Care Coordination:     -Reviewed pt s chart  -Rounded with team in review of pt's care and plan  -Has been accepted by [facility]           [place] staff,   sent the following message today:  \"[place] is scheduled for 10/5 @ 11am. Just to clarify waiver funding and waiver slot cannot be requested until the [place]choices has occurred and [place] and I agree to service needs.\"   [place]  Information: [email or web address]              Discharge Plan or Goal:   Has been accepted into [facility]. Awaiting [place] on 10/5/2022      Barriers to Discharge:  - Funding  -[place] to be expeditiously done     Referral Status:  - [facility]  - [facility]     Legal Status:  Court Hold     Contact:  Joi Roque  [facility]   [facility]  Phone: [phone]    Fax: [phone]    "

## 2022-09-29 NOTE — PLAN OF CARE
John was in his room nearly all shift. Laying in bed, but mostly awake in bed. Affect blunted, flat. Medication compliant.  He was up for both breakfast and lunch.  His bed was changed which he was agreeable with, but declined taking a shower or doing oral care.        Problem: Psychomotor Impairment (Psychotic Signs/Symptoms)  Goal: Improved Psychomotor Symptoms (Psychotic Signs/Symptoms)  Intervention: Manage Psychomotor Movement  Recent Flowsheet Documentation  Taken 9/29/2022 1000 by João Murillo RN  Activity (Behavioral Health): up ad holley   Goal Outcome Evaluation:

## 2022-09-29 NOTE — PLAN OF CARE
"Goal Outcome Evaluation:          Pt isolative to bed. Assisted with ambulating to lounge for dinner. Appetite good. Eye contact intermittent. Affect flat. Poverty of thought and speech. Calm, cooperative. Replied with \"thank you\" after interventions. Med-compliant. No complaints of pain or side effects. Hygiene poor, refuses assistance with shower. Continue with current treatment plan and recommendations. Continue to monitor and reassess symptoms. Monitor response to medications. Monitor progress towards treatment goals. Encourage groups and participation.              "

## 2022-09-29 NOTE — PLAN OF CARE
Goal Outcome Evaluation:      Patient slept well without interruptions for 11 hours. Nothing unusual noted. No complaints made.He will have CMP lab today. He has not used the bathroom yet the whole night.

## 2022-09-30 LAB
ALBUMIN SERPL-MCNC: 3.4 G/DL (ref 3.4–5)
ALP SERPL-CCNC: 69 U/L (ref 40–150)
ALT SERPL W P-5'-P-CCNC: 87 U/L (ref 0–70)
ANION GAP SERPL CALCULATED.3IONS-SCNC: 6 MMOL/L (ref 3–14)
AST SERPL W P-5'-P-CCNC: 147 U/L (ref 0–45)
BASOPHILS # BLD AUTO: 0.1 10E3/UL (ref 0–0.2)
BASOPHILS NFR BLD AUTO: 2 %
BILIRUB SERPL-MCNC: 0.4 MG/DL (ref 0.2–1.3)
BUN SERPL-MCNC: 20 MG/DL (ref 7–30)
CALCIUM SERPL-MCNC: 8.8 MG/DL (ref 8.5–10.1)
CHLORIDE BLD-SCNC: 114 MMOL/L (ref 94–109)
CO2 SERPL-SCNC: 23 MMOL/L (ref 20–32)
CREAT SERPL-MCNC: 0.7 MG/DL (ref 0.66–1.25)
EOSINOPHIL # BLD AUTO: 1.3 10E3/UL (ref 0–0.7)
EOSINOPHIL NFR BLD AUTO: 25 %
ERYTHROCYTE [DISTWIDTH] IN BLOOD BY AUTOMATED COUNT: 13.7 % (ref 10–15)
GFR SERPL CREATININE-BSD FRML MDRD: >90 ML/MIN/1.73M2
GLUCOSE BLD-MCNC: 131 MG/DL (ref 70–99)
HCT VFR BLD AUTO: 42.5 % (ref 40–53)
HGB BLD-MCNC: 15.3 G/DL (ref 13.3–17.7)
IMM GRANULOCYTES # BLD: 0 10E3/UL
IMM GRANULOCYTES NFR BLD: 0 %
LYMPHOCYTES # BLD AUTO: 1.4 10E3/UL (ref 0.8–5.3)
LYMPHOCYTES NFR BLD AUTO: 26 %
MCH RBC QN AUTO: 32.5 PG (ref 26.5–33)
MCHC RBC AUTO-ENTMCNC: 36 G/DL (ref 31.5–36.5)
MCV RBC AUTO: 90 FL (ref 78–100)
MONOCYTES # BLD AUTO: 0.4 10E3/UL (ref 0–1.3)
MONOCYTES NFR BLD AUTO: 8 %
NEUTROPHILS # BLD AUTO: 2 10E3/UL (ref 1.6–8.3)
NEUTROPHILS NFR BLD AUTO: 39 %
NRBC # BLD AUTO: 0 10E3/UL
NRBC BLD AUTO-RTO: 0 /100
PLATELET # BLD AUTO: 151 10E3/UL (ref 150–450)
POTASSIUM BLD-SCNC: 3.9 MMOL/L (ref 3.4–5.3)
PROT SERPL-MCNC: 7.5 G/DL (ref 6.8–8.8)
RBC # BLD AUTO: 4.71 10E6/UL (ref 4.4–5.9)
SODIUM SERPL-SCNC: 143 MMOL/L (ref 133–144)
WBC # BLD AUTO: 5.2 10E3/UL (ref 4–11)

## 2022-09-30 PROCEDURE — 124N000003 HC R&B MH SENIOR/ADOLESCENT

## 2022-09-30 PROCEDURE — 85025 COMPLETE CBC W/AUTO DIFF WBC: CPT | Performed by: PSYCHIATRY & NEUROLOGY

## 2022-09-30 PROCEDURE — 250N000013 HC RX MED GY IP 250 OP 250 PS 637: Performed by: PSYCHIATRY & NEUROLOGY

## 2022-09-30 PROCEDURE — 36415 COLL VENOUS BLD VENIPUNCTURE: CPT | Performed by: PSYCHIATRY & NEUROLOGY

## 2022-09-30 PROCEDURE — 250N000013 HC RX MED GY IP 250 OP 250 PS 637: Performed by: PHYSICIAN ASSISTANT

## 2022-09-30 PROCEDURE — 99231 SBSQ HOSP IP/OBS SF/LOW 25: CPT | Performed by: PSYCHIATRY & NEUROLOGY

## 2022-09-30 PROCEDURE — 80053 COMPREHEN METABOLIC PANEL: CPT | Performed by: PSYCHIATRY & NEUROLOGY

## 2022-09-30 RX ADMIN — THIAMINE HCL TAB 100 MG 100 MG: 100 TAB at 08:39

## 2022-09-30 RX ADMIN — MEMANTINE 10 MG: 10 TABLET ORAL at 08:39

## 2022-09-30 RX ADMIN — GABAPENTIN 100 MG: 100 CAPSULE ORAL at 12:08

## 2022-09-30 RX ADMIN — MIRTAZAPINE 15 MG: 15 TABLET, FILM COATED ORAL at 19:41

## 2022-09-30 RX ADMIN — THERA TABS 1 TABLET: TAB at 08:39

## 2022-09-30 RX ADMIN — BUSPIRONE HYDROCHLORIDE 30 MG: 15 TABLET ORAL at 08:39

## 2022-09-30 RX ADMIN — Medication 5 MG: at 19:42

## 2022-09-30 RX ADMIN — GABAPENTIN 100 MG: 100 CAPSULE ORAL at 17:27

## 2022-09-30 RX ADMIN — DONEPEZIL HYDROCHLORIDE 10 MG: 10 TABLET ORAL at 19:42

## 2022-09-30 RX ADMIN — BUSPIRONE HYDROCHLORIDE 30 MG: 15 TABLET ORAL at 19:41

## 2022-09-30 RX ADMIN — ASPIRIN 81 MG: 81 TABLET, COATED ORAL at 08:39

## 2022-09-30 RX ADMIN — MEGESTROL ACETATE 20 MG: 20 TABLET ORAL at 08:39

## 2022-09-30 RX ADMIN — MEMANTINE 10 MG: 10 TABLET ORAL at 19:42

## 2022-09-30 RX ADMIN — GABAPENTIN 100 MG: 100 CAPSULE ORAL at 08:39

## 2022-09-30 RX ADMIN — BENZTROPINE MESYLATE 0.5 MG: 0.5 TABLET ORAL at 08:39

## 2022-09-30 RX ADMIN — LEVOTHYROXINE SODIUM 88 MCG: 0.09 TABLET ORAL at 08:39

## 2022-09-30 RX ADMIN — CLOZAPINE 250 MG: 100 TABLET ORAL at 19:42

## 2022-09-30 RX ADMIN — BENZTROPINE MESYLATE 0.5 MG: 0.5 TABLET ORAL at 19:42

## 2022-09-30 RX ADMIN — SALMETEROL XINAFOATE 1 PUFF: 50 POWDER, METERED ORAL; RESPIRATORY (INHALATION) at 19:41

## 2022-09-30 RX ADMIN — SALMETEROL XINAFOATE 1 PUFF: 50 POWDER, METERED ORAL; RESPIRATORY (INHALATION) at 08:39

## 2022-09-30 ASSESSMENT — ACTIVITIES OF DAILY LIVING (ADL)
ADLS_ACUITY_SCORE: 77

## 2022-09-30 NOTE — PLAN OF CARE
09/30/22 1200   Individualization/Patient Specific Goals   Patient Personal Strengths Resilient; Improvements in cooperating with directions    Patient Vulnerabilities lacks insight into illness   Anxieties, Fears or Concerns Patient lacks insight into his illness. He is at baseline with delusions and hallucinations.   Individualized Care Needs Patient is on a commitment/pittman and guardianship has been determined. Continue to encourage food, hygiene, socializing/cominig out to the lounge, taking walks on the unit.   Patient-Specific Goals (Include Timeframe) Continue with plan for stabilization 10-15 days   Interprofessional Rounds   Summary Patient continues to be stable at baseline. Patient has delusions and hallucinations. Patient is waiting for placement. He has been accepted into [facility]. Now awaiting [place] following which he can be discharged to [facility], once the  and [facility] agree on a rate.   Participants nursing;psychiatrist;CTC;OT   Team Discussion   Participants Dr. Dk MD; FIFI Rivera, Wen Broderick RN; Radha Licona OTR; LASHAY Licona.; Daphne Bernstein RN   Progress Making progress.   Anticipated length of stay 10-14 days   Continued Stay Criteria/Rationale Patient can not discharge safely. He needs assistance with all ADLs. Team is waiting on final guardianship ruling. Patient will discharge to a group home/assisted living.   Medical/Physical Please see H and P.   Plan Continue with plan: medication management, psychiatric evaluations, medical evaluations as needed, nursing assessments, milieu therapy, and CTC case management. Order for guardianship has been received. Letters of guardianship have been received. Guardian is established. Pt to discharge to [facility] once [place] is completed and rate is agreed upon by John and [facility]I .   Anticipated Discharge Disposition Letters of guardianship received. Referrals  to nursing homes are ongoing.       PRECAUTIONS AND SAFETY               Behavioral Orders   Procedures     Code 1 - Restrict to Unit     Code 2       CT scan only     Code 3       Patient can go out of the unit with staff supervision. He needs to be taken out by him self with 2 staff and security present.     Fall precautions     Routine Programming       As clinically indicated     Status 15       Every 15 minutes.            Safety  Safety WDL: WDL  Patient Location: patient room, own  Observed Behavior: calm  Observed Behavior (Comment): laying in bed  Safety Measures: safety plan reviewed  Diversional Activity: other (see comments) (pt declined to come out of the roonm)  Suicidality: Status 15  Seizure precautions: clutter free environment  Assault: status 15  Elopement Assessment: Distress about legal situation (holds for mental health or criminal)  Elopement Interventions: status 15  Sexual: status 15, private room

## 2022-09-30 NOTE — PLAN OF CARE
Goal Outcome Evaluation:    Mental Health Assessment  No change in behaviors. Pt is observed to be responding to internal stimuli. Pt declined to come to lounge for breakfast, ate well in his room. Pt was propelled in WC to lounge for lunch, ate well. Refusing to walk at this time. Fluids encouraged. Pt appear alert when spoken to.     Medications  Compliant  No PRN required    Physiological Assessment  VS:B/P: 97/67, T: 98.2, P: 97, R: 16   Pain:denies, non verbal bx absent  Gait and Ambulation: unsteady and weak  Appetite: adequate with encouragement  Bowels:continent     Follow Up and Recommendations  Linen appears clean, not changed      Plan of Care Reviewed With: patient

## 2022-09-30 NOTE — PROGRESS NOTES
PSYCHIATRY  PROGRESS NOTE     DATE OF SERVICE   09/30/2022       CHIEF COMPLAINT   Patient was admitted due to inability to safely care for himself and psychosis.       SUBJECTIVE   Nursing reports:  Goal Outcome Evaluation:   Mental Health Assessment  No change in behaviors. Pt is observed to be responding to internal stimuli. Pt declined to come to lounge for breakfast, ate well in his room. Pt was propelled in WC to lounge for lunch, ate well. Refusing to walk at this time. Fluids encouraged. Pt appear alert when spoken to.     Patient has been discussed in detail with the  during team meeting.  There has been no updates on patient's discharge.     OBJECTIVE   Patient was seen and evaluated at bedside by himself, this was a face-to-face evaluation.  Patient remains at his baseline and no new behaviors have been reported.  Patient stated that he is doing fine and is just feeling a little bit tired.  He did agree to have the blood work done.  Denies any other concerns at the time.       MEDICATIONS   Medications:  Scheduled Meds:    aspirin  81 mg Oral Daily     benztropine  0.5 mg Oral BID     busPIRone HCl  30 mg Oral BID     cloZAPine  250 mg Oral At Bedtime     donepezil  10 mg Oral At Bedtime     gabapentin  100 mg Oral TID w/meals     levothyroxine  88 mcg Oral Daily     megestrol  20 mg Oral Daily     melatonin  5 mg Oral At Bedtime     memantine  10 mg Oral BID     mirtazapine  15 mg Oral At Bedtime     multivitamin, therapeutic  1 tablet Oral Daily     salmeterol  1 puff Inhalation BID     thiamine  100 mg Oral Daily     Continuous Infusions:  PRN Meds:.albuterol, alum & mag hydroxide-simethicone, hydrOXYzine, nystatin, polyethylene glycol, senna-docusate    Medication adherence issues: MS Med Adherence Y/N: Yes, Hospitalization  Medication side effects: MEDICATION SIDE EFFECTS: no side effects reported  Benefit: Yes / No: Yes       ROS   A comprehensive review of systems was negative.    "    MENTAL STATUS EXAM   Vitals: /87   Pulse 105   Temp 97.7  F (36.5  C) (Oral)   Resp 16   Ht 1.778 m (5' 10\")   Wt 97.9 kg (215 lb 13.3 oz)   SpO2 95%   BMI 30.97 kg/m       Same as last visit  Appearance:  No apparent distress  Mood: \"I am alright\".  Affect: Calm  Suicidal Ideation: Denies  Homicidal Ideation: Denies  Thought process: Disorganized and concrete  Thought content: Remains confused and delusional and hallucinating at times.    Fund of Knowledge: Below average  Attention/Concentration: Poor  Language ability: Significantly impaired  Memory: Global memory impairment  Insight:  Poor.  Judgement: Poor  Orientation: Only to person  Psychomotor Behavior: slowed    Muscle Strength and Tone: MuscleStrength: Normal and Atrophy  Gait and Station: Not evaluated       LABS   personally reviewed.     No results found for: PHENYTOIN, PHENOBARB, VALPROATE, CBMZ       DIAGNOSIS   Principal Problem:    Major neurocognitive disorder, due to multiple etiologies, with behavioral disturbance, severe    Active Problem List:  Patient Active Problem List   Diagnosis     TBI with aggressive behavior     HTN (hypertension)     COPD (chronic obstructive pulmonary disease) (H)     Dementia with behavioral disturbance (H)     Chronic schizophrenia (H)     Smoker     Agitation     Behavior disturbance     Psychosis (H)     Major neurocognitive disorder, due to multiple etiologies, with behavioral disturbance, severe (H)     Paranoid schizophrenia, chronic condition with acute exacerbation (H)     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 Ongoing education given regarding diagnostic and treatment options with risks, benefits and alternatives and adequate verbalization of understanding.  2.  Medications       BuSpar 30 mg 2 times daily       " Cogentin 0.5 mg 2 times a day       Clozaril 250 mg at bedtime       Aricept 10 mg at bedtime       Gabapentin 100 mg 3 times a day       Namenda 10 mg 2 times a day       Remeron 15 mg at bedtime       melatonin 5 mg at bedtime  3.  Medical team following the patient   4.   coordinating a safe discharge plan with the patient.  5.  Labs have been ordered, liver functions remained stable consistent with the hep C diagnosis.      Risk Assessment: [facility] RISK ASSESSMENT: Patient able to contract for safety    Coordination of Care:   Treatment Plan reviewed and physician signed, Care discussed with Care/Treatment Team Members, Chart reviewed and Patient seen      Re-Certification I certify that the inpatient psychiatric facility services furnished since the previous certification were, and continue to be, medically necessary for, either, treatment which could reasonably be expected to improve the patient s condition or diagnostic study and that the hospital records indicate that the services furnished were, either, intensive treatment services, admission and related services necessary for diagnostic study, or equivalent services.     I certify that the patient continues to need, on a daily basis, active treatment furnished directly by or requiring the supervision of inpatient psychiatric facility personnel.   I estimate 14 days of hospitalization is necessary for proper treatment of the patient. My plans for post-hospital care for this patient are  TBD     Ginger Dubon MD    -     09/30/2022  -     1:57 PM    Total time 15 minutes with > 50%spent on coordination of cares and psycho-education.    This note was created with help of Dragon dictation system. Grammatical / typing errors are not intentional.    Ginger Dubon MD

## 2022-09-30 NOTE — PROGRESS NOTES
Patient was seen and evaluated today with the use of telehealth.  Patient continues to be at his baseline and no new behaviors have been reported. Patient continues to be delusional and grandiose.  Responding to internal stimuli.    Ginger Dubon MD

## 2022-09-30 NOTE — PLAN OF CARE
Goal Outcome Evaluation:      Patient slept well the whole night for 10.5  hours. Nothing unusual noted. No behavioral issues observed.

## 2022-09-30 NOTE — PLAN OF CARE
"  Problem: Sensory Perception Impairment (Psychotic Signs/Symptoms)  Goal: Decreased Sensory Symptoms (Psychotic Signs/Symptoms)  Outcome: Ongoing, Progressing   Goal Outcome Evaluation:    Plan of Care Reviewed With: patient       Pt is alert and oriented to self and place. Pt is calm with flat blunted affect.  Pt is withdrawn and isolative remained in his room.  Pt continues to avoid social interaction.  Denies SI/SIB,AH/VH,Anxiety and pain. Pt stayed in his room most of the shift   Medication compliment.  Pt is tolerating intakes with good appetite. No raised concerns.  Staff will continue with plan of care.        /74 (BP Location: Right arm, Patient Position: Left side, Cuff Size: Adult Small)   Pulse 89   Temp 97.2  F (36.2  C) (Temporal)   Resp 16   Ht 1.778 m (5' 10\")   Wt 97.9 kg (215 lb 13.3 oz)   SpO2 98%   BMI 30.97 kg/m      "

## 2022-09-30 NOTE — PLAN OF CARE
Goal Outcome Evaluation:    Plan of Care Reviewed With: patient        Pt isolative to bed. Unsteady when prompted to rise from bed for dinner. Pt requested to be brought to lounge for dinner in wheelchair. Appetite good. Eye contact intermittent. Affect flat. Poverty of thought and speech. Calm, polite, cooperative. Med-compliant. No complaints of pain or side effects. Hygiene poor, refuses assistance with shower. Continue with current treatment plan and recommendations. Continue to monitor and reassess symptoms. Monitor response to medications. Monitor progress towards treatment goals. Encourage groups and participation.

## 2022-10-01 PROCEDURE — 250N000013 HC RX MED GY IP 250 OP 250 PS 637: Performed by: PSYCHIATRY & NEUROLOGY

## 2022-10-01 PROCEDURE — 124N000003 HC R&B MH SENIOR/ADOLESCENT

## 2022-10-01 PROCEDURE — 250N000013 HC RX MED GY IP 250 OP 250 PS 637: Performed by: PHYSICIAN ASSISTANT

## 2022-10-01 RX ADMIN — ASPIRIN 81 MG: 81 TABLET, COATED ORAL at 08:26

## 2022-10-01 RX ADMIN — MEMANTINE 10 MG: 10 TABLET ORAL at 20:05

## 2022-10-01 RX ADMIN — THIAMINE HCL TAB 100 MG 100 MG: 100 TAB at 08:26

## 2022-10-01 RX ADMIN — GABAPENTIN 100 MG: 100 CAPSULE ORAL at 08:26

## 2022-10-01 RX ADMIN — THERA TABS 1 TABLET: TAB at 08:26

## 2022-10-01 RX ADMIN — MEMANTINE 10 MG: 10 TABLET ORAL at 08:26

## 2022-10-01 RX ADMIN — BUSPIRONE HYDROCHLORIDE 30 MG: 15 TABLET ORAL at 08:26

## 2022-10-01 RX ADMIN — DONEPEZIL HYDROCHLORIDE 10 MG: 10 TABLET ORAL at 20:05

## 2022-10-01 RX ADMIN — SALMETEROL XINAFOATE 1 PUFF: 50 POWDER, METERED ORAL; RESPIRATORY (INHALATION) at 08:27

## 2022-10-01 RX ADMIN — LEVOTHYROXINE SODIUM 88 MCG: 0.09 TABLET ORAL at 08:26

## 2022-10-01 RX ADMIN — BENZTROPINE MESYLATE 0.5 MG: 0.5 TABLET ORAL at 08:30

## 2022-10-01 RX ADMIN — GABAPENTIN 100 MG: 100 CAPSULE ORAL at 17:31

## 2022-10-01 RX ADMIN — CLOZAPINE 250 MG: 100 TABLET ORAL at 20:04

## 2022-10-01 RX ADMIN — GABAPENTIN 100 MG: 100 CAPSULE ORAL at 12:21

## 2022-10-01 RX ADMIN — MEGESTROL ACETATE 20 MG: 20 TABLET ORAL at 08:26

## 2022-10-01 RX ADMIN — BUSPIRONE HYDROCHLORIDE 30 MG: 15 TABLET ORAL at 20:04

## 2022-10-01 RX ADMIN — BENZTROPINE MESYLATE 0.5 MG: 0.5 TABLET ORAL at 20:04

## 2022-10-01 RX ADMIN — Medication 5 MG: at 20:05

## 2022-10-01 RX ADMIN — SALMETEROL XINAFOATE 1 PUFF: 50 POWDER, METERED ORAL; RESPIRATORY (INHALATION) at 20:35

## 2022-10-01 RX ADMIN — MIRTAZAPINE 15 MG: 15 TABLET, FILM COATED ORAL at 20:04

## 2022-10-01 ASSESSMENT — ACTIVITIES OF DAILY LIVING (ADL)
ADLS_ACUITY_SCORE: 77

## 2022-10-01 NOTE — PLAN OF CARE
"  Problem: Behavioral Health Plan of Care  Goal: Adheres to Safety Considerations for Self and Others  Outcome: Ongoing, Progressing  Note: No changes in patient's status since yesterday. He denied all psych symptoms when asked. Said he feels tired and wanted to sleep.   Behavior was controlled. However, patient isolated in his room most of the shift. Declined groups and unit activities. Out for meals. Ate 30% of breakfast and 100% of lunch meal.   Patient's physical ability to ambulate appeared to be declining, he was assisted in wheel chair to and back from dining room. Patient needed staff assist with meal set up/cutting the food.   Patient took medications as prescribed, no SE reported or assessed.     He denied pain and all other physical issues.    /78   Pulse 105   Temp 98.4  F (36.9  C) (Oral)   Resp 16   Ht 1.778 m (5' 10\")   Wt 97.9 kg (215 lb 13.3 oz)   SpO2 98%   BMI 30.97 kg/m    "

## 2022-10-01 NOTE — PLAN OF CARE
Problem: Sleep Disturbance (Psychotic Signs/Symptoms)  Goal: Improved Sleep (Psychotic Signs/Symptoms)  Outcome: Ongoing, Progressing     Problem: Fall Injury Risk  Goal: Absence of Fall and Fall-Related Injury  Outcome: Ongoing     Problem: Mobility Impairment  Goal: Optimal Mobility [place] and Safety  Outcome: Ongoing   Goal Outcome Evaluation:    Patient has remained in his room sleeping.  Up to restroom X1 overnight.  Gait steady.  No concerns reported.  8.5 hrs of sleep.

## 2022-10-02 PROCEDURE — 250N000013 HC RX MED GY IP 250 OP 250 PS 637: Performed by: PHYSICIAN ASSISTANT

## 2022-10-02 PROCEDURE — 250N000013 HC RX MED GY IP 250 OP 250 PS 637: Performed by: PSYCHIATRY & NEUROLOGY

## 2022-10-02 PROCEDURE — 124N000003 HC R&B MH SENIOR/ADOLESCENT

## 2022-10-02 RX ADMIN — SALMETEROL XINAFOATE 1 PUFF: 50 POWDER, METERED ORAL; RESPIRATORY (INHALATION) at 10:26

## 2022-10-02 RX ADMIN — THERA TABS 1 TABLET: TAB at 08:39

## 2022-10-02 RX ADMIN — THIAMINE HCL TAB 100 MG 100 MG: 100 TAB at 08:39

## 2022-10-02 RX ADMIN — MEMANTINE 10 MG: 10 TABLET ORAL at 08:39

## 2022-10-02 RX ADMIN — LEVOTHYROXINE SODIUM 88 MCG: 0.09 TABLET ORAL at 08:39

## 2022-10-02 RX ADMIN — MEGESTROL ACETATE 20 MG: 20 TABLET ORAL at 08:39

## 2022-10-02 RX ADMIN — DONEPEZIL HYDROCHLORIDE 10 MG: 10 TABLET ORAL at 20:23

## 2022-10-02 RX ADMIN — GABAPENTIN 100 MG: 100 CAPSULE ORAL at 17:15

## 2022-10-02 RX ADMIN — MIRTAZAPINE 15 MG: 15 TABLET, FILM COATED ORAL at 20:23

## 2022-10-02 RX ADMIN — BENZTROPINE MESYLATE 0.5 MG: 0.5 TABLET ORAL at 20:23

## 2022-10-02 RX ADMIN — BUSPIRONE HYDROCHLORIDE 30 MG: 15 TABLET ORAL at 20:23

## 2022-10-02 RX ADMIN — GABAPENTIN 100 MG: 100 CAPSULE ORAL at 08:39

## 2022-10-02 RX ADMIN — Medication 5 MG: at 20:23

## 2022-10-02 RX ADMIN — BENZTROPINE MESYLATE 0.5 MG: 0.5 TABLET ORAL at 08:39

## 2022-10-02 RX ADMIN — SALMETEROL XINAFOATE 1 PUFF: 50 POWDER, METERED ORAL; RESPIRATORY (INHALATION) at 20:23

## 2022-10-02 RX ADMIN — BUSPIRONE HYDROCHLORIDE 30 MG: 15 TABLET ORAL at 08:39

## 2022-10-02 RX ADMIN — CLOZAPINE 250 MG: 100 TABLET ORAL at 20:23

## 2022-10-02 RX ADMIN — GABAPENTIN 100 MG: 100 CAPSULE ORAL at 12:29

## 2022-10-02 RX ADMIN — MEMANTINE 10 MG: 10 TABLET ORAL at 20:23

## 2022-10-02 RX ADMIN — ASPIRIN 81 MG: 81 TABLET, COATED ORAL at 08:38

## 2022-10-02 ASSESSMENT — ACTIVITIES OF DAILY LIVING (ADL)
ADLS_ACUITY_SCORE: 77

## 2022-10-02 NOTE — PLAN OF CARE
"Goal Outcome Evaluation:    Plan of Care Reviewed With: patient        Pt isolative to bed. Unsteady when prompted to rise from bed for dinner. Pt requested to be brought to [facility] for dinner in wheelchair.  Appetite good. Eye contact intermittent. Affect flat. Poverty of thought and speech. Calm, polite, cooperative. Med-compliant. No complaints of pain or side effects. Hygiene poor, refuses assistance with shower but states he will do it \"tomorrow morning.\" Later ambulated to [facility] during snack time and stated \"I came to see what's on TV.\" Continue with current treatment plan and recommendations. Continue to monitor and reassess symptoms. Monitor response to medications. Monitor progress towards treatment goals. Encourage groups and participation.         "

## 2022-10-02 NOTE — PLAN OF CARE
Goal Outcome Evaluation:      Patient has remained in his room sleeping.  No concerns noted.  Up to restroom X1. Continent. Gait steady.  9.5 hrs of sleep.

## 2022-10-02 NOTE — PLAN OF CARE
Goal Outcome Evaluation:    Plan of Care Reviewed With: patient        Pt isolative to bed. BP soft: 97/70 lying, P 73. Pt refused standing BP. Refused to get out of bed for dinner. Appetite poor. Eye contact intermittent. Affect flat. Poverty of thought and speech. Calm, appreciative. Med-compliant. No complaints of pain or side effects. Hygiene poor, refuses assistance with shower. Unable to change bed linens because pt refused to get out of bed. Pt encouraged to ring tap bell when getting out of bed to go to bathroom. Bathroom light left on for night light. Continue with current treatment plan and recommendations. Continue to monitor and reassess symptoms. Monitor response to medications. Monitor progress towards treatment goals. Encourage groups and participation.

## 2022-10-02 NOTE — PLAN OF CARE
"Goal Outcome Evaluation:    Plan of Care Reviewed With: patient   Nursing Assessment    Psychosis (H) [F29]    Admit Date: 1/26/2022    Length of Stay: 249    Patient evaluation continues. Assessed mood,anxiety,thoughts and behavior. Patient is progressing towards goals. Patient is encouraged to participate in groups and assisted to develop healthy coping skills.  Patient admits to talking with his mother who is not present, positive for auditory hallucinations. /79   Pulse 107   Temp 98.4  F (36.9  C) (Oral)   Resp 18   Ht 1.778 m (5' 10\")   Wt 78.7 kg (173 lb 8 oz)   SpO2 99%   BMI 24.89 kg/m      Mood: good    Patient reports depression none and reports anxiety none    Affect: flat , blunted    Sleep: good 10 hours last night and naps throughout the day    Appetite: good    SI: denies    HI: denies    SIB: denies      Medication Compliance yes    Group participation: no    ADL's: assist of one when pt allows    Fall risk interventions: proper foot wear, orthostatic B/p, standby assist    Rinku Score Interventions:none    Discharge planning in process    Refer to daily team meeting notes for individualized plan of care. Nursing will continue to assess.    *Scale is 1-10 and 10 is the worst.                 Problem: Behavior Regulation Impairment (Psychotic Signs/Symptoms)  Goal: Improved Behavioral Control (Psychotic Signs/Symptoms)  Outcome: Ongoing, Progressing  Flowsheets (Taken 10/2/2022 1440)  Mutually Determined Action Steps (Improved Behavioral Control): verbalizes gratifying activity         "

## 2022-10-03 PROCEDURE — 250N000013 HC RX MED GY IP 250 OP 250 PS 637: Performed by: PSYCHIATRY & NEUROLOGY

## 2022-10-03 PROCEDURE — 99231 SBSQ HOSP IP/OBS SF/LOW 25: CPT | Performed by: PSYCHIATRY & NEUROLOGY

## 2022-10-03 PROCEDURE — 250N000013 HC RX MED GY IP 250 OP 250 PS 637: Performed by: PHYSICIAN ASSISTANT

## 2022-10-03 PROCEDURE — 124N000003 HC R&B MH SENIOR/ADOLESCENT

## 2022-10-03 RX ADMIN — GABAPENTIN 100 MG: 100 CAPSULE ORAL at 08:11

## 2022-10-03 RX ADMIN — SALMETEROL XINAFOATE 1 PUFF: 50 POWDER, METERED ORAL; RESPIRATORY (INHALATION) at 19:42

## 2022-10-03 RX ADMIN — DONEPEZIL HYDROCHLORIDE 10 MG: 10 TABLET ORAL at 19:42

## 2022-10-03 RX ADMIN — BUSPIRONE HYDROCHLORIDE 30 MG: 15 TABLET ORAL at 08:10

## 2022-10-03 RX ADMIN — MEMANTINE 10 MG: 10 TABLET ORAL at 19:42

## 2022-10-03 RX ADMIN — THERA TABS 1 TABLET: TAB at 08:10

## 2022-10-03 RX ADMIN — CLOZAPINE 250 MG: 100 TABLET ORAL at 19:41

## 2022-10-03 RX ADMIN — LEVOTHYROXINE SODIUM 88 MCG: 0.09 TABLET ORAL at 08:10

## 2022-10-03 RX ADMIN — SALMETEROL XINAFOATE 1 PUFF: 50 POWDER, METERED ORAL; RESPIRATORY (INHALATION) at 08:11

## 2022-10-03 RX ADMIN — THIAMINE HCL TAB 100 MG 100 MG: 100 TAB at 08:10

## 2022-10-03 RX ADMIN — Medication 5 MG: at 19:42

## 2022-10-03 RX ADMIN — MEGESTROL ACETATE 20 MG: 20 TABLET ORAL at 08:10

## 2022-10-03 RX ADMIN — GABAPENTIN 100 MG: 100 CAPSULE ORAL at 17:27

## 2022-10-03 RX ADMIN — MIRTAZAPINE 15 MG: 15 TABLET, FILM COATED ORAL at 19:42

## 2022-10-03 RX ADMIN — BUSPIRONE HYDROCHLORIDE 30 MG: 15 TABLET ORAL at 19:42

## 2022-10-03 RX ADMIN — MEMANTINE 10 MG: 10 TABLET ORAL at 08:10

## 2022-10-03 RX ADMIN — BENZTROPINE MESYLATE 0.5 MG: 0.5 TABLET ORAL at 08:10

## 2022-10-03 RX ADMIN — BENZTROPINE MESYLATE 0.5 MG: 0.5 TABLET ORAL at 19:42

## 2022-10-03 RX ADMIN — GABAPENTIN 100 MG: 100 CAPSULE ORAL at 13:10

## 2022-10-03 RX ADMIN — ASPIRIN 81 MG: 81 TABLET, COATED ORAL at 08:10

## 2022-10-03 ASSESSMENT — ACTIVITIES OF DAILY LIVING (ADL)
ADLS_ACUITY_SCORE: 77

## 2022-10-03 NOTE — PLAN OF CARE
"Assessment/Intervention/Current Symptoms and Care Coordination:     -Reviewed pt s chart  -Rounded with team in review of pt's care and plan  -Has been accepted by [facility].  Per : \"[employer] [place] scheduled for 10/5 @ 11am. Just to clarify waiver funding and waiver slot cannot be requested until the [place]choices has occurred and [place] and I agree to service needs.\"   [place]  Information: [email or web address]     Discharge Plan or Goal:   Has been accepted into [facility]. [place]/2022      Barriers to Discharge:  - Funding  -[place]     Referral Status:  - [facility]  - [facility]     Legal Status:  Court Hold     Contact:  Joi Roque  [facility]   [facility]  Phone: [phone]    Fax: [phone]       "

## 2022-10-03 NOTE — PROGRESS NOTES
"PSYCHIATRY  PROGRESS NOTE     DATE OF SERVICE   10/03/2022       CHIEF COMPLAINT   Patient was admitted due to inability to safely care for himself and psychosis.       SUBJECTIVE   Nursing reports:  Patient evaluation continues. Assessed mood,anxiety,thoughts and behavior. Patient is progressing towards goals. Patient is encouraged to participate in groups and assisted to develop healthy coping skills.  Patient admits to auditory or visual hallucinations. /70   Pulse 83   Temp 97.6  F (36.4  C) (Temporal)   Resp 16   Ht 1.778 m (5' 10\")   Wt 78.7 kg (173 lb 8 oz)   SpO2 98%   BMI 24.89 kg/m      Patient has been discussed in detail with the  during team meeting.  [place] should [place] completed on October 5.  At this time patient is stable in order to be discharged from the hospital.     OBJECTIVE   Patient was seen and evaluated at bedside by himself, this was a face-to-face evaluation.  Patient reported that he is doing \"all right\" and denied all psychiatric symptoms.  He remains confused, only oriented to himself, grandiose and at times hallucinating.  This is all part of patient's baseline that has not changed for months.  Patient will be assessing the patient on 5 October.  Patient has been accepted to a facility and we are just waiting on the rate negotiations.       MEDICATIONS   Medications:  Scheduled Meds:    aspirin  81 mg Oral Daily     benztropine  0.5 mg Oral BID     busPIRone HCl  30 mg Oral BID     cloZAPine  250 mg Oral At Bedtime     donepezil  10 mg Oral At Bedtime     gabapentin  100 mg Oral TID w/meals     levothyroxine  88 mcg Oral Daily     megestrol  20 mg Oral Daily     melatonin  5 mg Oral At Bedtime     memantine  10 mg Oral BID     mirtazapine  15 mg Oral At Bedtime     multivitamin, therapeutic  1 tablet Oral Daily     salmeterol  1 puff Inhalation BID     thiamine  100 mg Oral Daily     Continuous Infusions:  PRN Meds:.albuterol, alum & mag " "hydroxide-simethicone, hydrOXYzine, nystatin, polyethylene glycol, senna-docusate    Medication adherence issues: MS Med Adherence Y/N: Yes, Hospitalization  Medication side effects: MEDICATION SIDE EFFECTS: no side effects reported  Benefit: Yes / No: Yes       ROS   A comprehensive review of systems was negative.       MENTAL STATUS EXAM   Vitals: /70   Pulse 83   Temp 97.6  F (36.4  C) (Temporal)   Resp 16   Ht 1.778 m (5' 10\")   Wt 78.7 kg (173 lb 8 oz)   SpO2 98%   BMI 24.89 kg/m       Same as last visit  Appearance:  No apparent distress  Mood: \"I am alright\".  Affect: Calm  Suicidal Ideation: Denies  Homicidal Ideation: Denies  Thought process: Disorganized and concrete  Thought content: Remains confused and delusional and hallucinating at times.    Fund of Knowledge: Below average  Attention/Concentration: Poor  Language ability: Significantly impaired  Memory: Global memory impairment  Insight:  Poor.  Judgement: Poor  Orientation: Only to person  Psychomotor Behavior: slowed    Muscle Strength and Tone: MuscleStrength: Normal and Atrophy  Gait and Station: Not evaluated       LABS   personally reviewed.     No results found for: PHENYTOIN, PHENOBARB, VALPROATE, CBMZ       DIAGNOSIS   Principal Problem:    Major neurocognitive disorder, due to multiple etiologies, with behavioral disturbance, severe    Active Problem List:  Patient Active Problem List   Diagnosis     TBI with aggressive behavior     HTN (hypertension)     COPD (chronic obstructive pulmonary disease) (H)     Dementia with behavioral disturbance     Chronic schizophrenia (H)     Smoker     Agitation     Behavior disturbance     Psychosis (H)     Major neurocognitive disorder, due to multiple etiologies, with behavioral disturbance, severe     Paranoid schizophrenia, chronic condition with acute exacerbation (H)     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 Ongoing education given regarding diagnostic and treatment options with risks, benefits and alternatives and adequate verbalization of understanding.  2.  Medications       BuSpar 30 mg 2 times daily       Cogentin 0.5 mg 2 times a day       Clozaril 250 mg at bedtime       Aricept 10 mg at bedtime       Gabapentin 100 mg 3 times a day       Namenda 10 mg 2 times a day       Remeron 15 mg at bedtime       melatonin 5 mg at bedtime  3.  Medical team following the patient   4.   coordinating a safe discharge plan with the patient.  5.  Labs have been ordered, liver functions remained stable consistent with the hep C diagnosis.      Risk Assessment: [facility] RISK ASSESSMENT: Patient able to contract for safety    Coordination of Care:   Treatment Plan reviewed and physician signed, Care discussed with Care/Treatment Team Members, Chart reviewed and Patient seen      Re-Certification I certify that the inpatient psychiatric facility services furnished since the previous certification were, and continue to be, medically necessary for, either, treatment which could reasonably be expected to improve the patient s condition or diagnostic study and that the hospital records indicate that the services furnished were, either, intensive treatment services, admission and related services necessary for diagnostic study, or equivalent services.     I certify that the patient continues to need, on a daily basis, active treatment furnished directly by or requiring the supervision of inpatient psychiatric facility personnel.   I estimate 14 days of hospitalization is necessary for proper treatment of the patient. My plans for post-hospital care for this patient are  TBD     Ginger Dubon MD    -     10/03/2022  -     3:22 PM    Total time 15 minutes with > 50%spent on coordination of cares and  psycho-education.    This note was created with help of Dragon dictation system. Grammatical / typing errors are not intentional.    Ginger Dubon MD

## 2022-10-03 NOTE — PLAN OF CARE
"  Problem: Behavior Regulation Impairment (Psychotic Signs/Symptoms)  Goal: Improved Behavioral Control (Psychotic Signs/Symptoms)  Outcome: Met   Goal Outcome Evaluation:    Plan of Care Reviewed With: patient           Nursing Assessment    Psychosis (H) [F29]    Admit Date: 1/26/2022    Length of Stay: 250    Patient evaluation continues. Assessed mood,anxiety,thoughts and behavior. Patient is progressing towards goals. Patient is encouraged to participate in groups and assisted to develop healthy coping skills.  Patient admits to auditory or visual hallucinations. /70   Pulse 83   Temp 97.6  F (36.4  C) (Temporal)   Resp 16   Ht 1.778 m (5' 10\")   Wt 78.7 kg (173 lb 8 oz)   SpO2 98%   BMI 24.89 kg/m      Mood: ok    Patient reports depression no and reports anxiety no    Affect:flat blunted    Sleep: 9 hours last night    Appetite: good 75%    SI: denies    HI: denies    SIB: denies      Medication Compliance yes    Group participation: no    ADL's: encouraged assisted as pt allows    Fall risk interventions: proper foot wear, orthostatic B/P, standby assist    Rinku Score Interventions:none    Discharge planning in process    Refer to daily team meeting notes for individualized plan of care. Nursing will continue to assess.    *Scale is 1-10 and 10 is the worst.             "

## 2022-10-03 NOTE — PLAN OF CARE
Problem: Sleep Disturbance (Psychotic Signs/Symptoms)  Goal: Improved Sleep (Psychotic Signs/Symptoms)  Outcome: Ongoing, Progressing     Problem: Fall Injury Risk  Goal: Absence of Fall and Fall-Related Injury  Outcome: Ongoing, Progressing   Goal Outcome Evaluation:  Patient has remained in his room sleeping throughout the night.  Up to restroom X1, gait somewhat steady.  No concerns reported overnight. Continent. 9 hrs of sleep.

## 2022-10-04 PROCEDURE — 124N000003 HC R&B MH SENIOR/ADOLESCENT

## 2022-10-04 PROCEDURE — 250N000013 HC RX MED GY IP 250 OP 250 PS 637: Performed by: PSYCHIATRY & NEUROLOGY

## 2022-10-04 PROCEDURE — 250N000013 HC RX MED GY IP 250 OP 250 PS 637: Performed by: PHYSICIAN ASSISTANT

## 2022-10-04 PROCEDURE — 99231 SBSQ HOSP IP/OBS SF/LOW 25: CPT | Performed by: PSYCHIATRY & NEUROLOGY

## 2022-10-04 RX ADMIN — THIAMINE HCL TAB 100 MG 100 MG: 100 TAB at 08:24

## 2022-10-04 RX ADMIN — BENZTROPINE MESYLATE 0.5 MG: 0.5 TABLET ORAL at 08:24

## 2022-10-04 RX ADMIN — BUSPIRONE HYDROCHLORIDE 30 MG: 15 TABLET ORAL at 19:27

## 2022-10-04 RX ADMIN — THERA TABS 1 TABLET: TAB at 08:24

## 2022-10-04 RX ADMIN — MEMANTINE 10 MG: 10 TABLET ORAL at 08:24

## 2022-10-04 RX ADMIN — BENZTROPINE MESYLATE 0.5 MG: 0.5 TABLET ORAL at 19:27

## 2022-10-04 RX ADMIN — GABAPENTIN 100 MG: 100 CAPSULE ORAL at 17:20

## 2022-10-04 RX ADMIN — CLOZAPINE 250 MG: 100 TABLET ORAL at 19:27

## 2022-10-04 RX ADMIN — MEGESTROL ACETATE 20 MG: 20 TABLET ORAL at 08:24

## 2022-10-04 RX ADMIN — GABAPENTIN 100 MG: 100 CAPSULE ORAL at 12:29

## 2022-10-04 RX ADMIN — SALMETEROL XINAFOATE 1 PUFF: 50 POWDER, METERED ORAL; RESPIRATORY (INHALATION) at 19:27

## 2022-10-04 RX ADMIN — MIRTAZAPINE 15 MG: 15 TABLET, FILM COATED ORAL at 19:27

## 2022-10-04 RX ADMIN — SALMETEROL XINAFOATE 1 PUFF: 50 POWDER, METERED ORAL; RESPIRATORY (INHALATION) at 08:28

## 2022-10-04 RX ADMIN — GABAPENTIN 100 MG: 100 CAPSULE ORAL at 08:24

## 2022-10-04 RX ADMIN — Medication 5 MG: at 19:27

## 2022-10-04 RX ADMIN — LEVOTHYROXINE SODIUM 88 MCG: 0.09 TABLET ORAL at 08:24

## 2022-10-04 RX ADMIN — MEMANTINE 10 MG: 10 TABLET ORAL at 19:27

## 2022-10-04 RX ADMIN — ASPIRIN 81 MG: 81 TABLET, COATED ORAL at 08:24

## 2022-10-04 RX ADMIN — DONEPEZIL HYDROCHLORIDE 10 MG: 10 TABLET ORAL at 19:27

## 2022-10-04 RX ADMIN — BUSPIRONE HYDROCHLORIDE 30 MG: 15 TABLET ORAL at 08:24

## 2022-10-04 ASSESSMENT — ACTIVITIES OF DAILY LIVING (ADL)
ADLS_ACUITY_SCORE: 77

## 2022-10-04 NOTE — PLAN OF CARE
Goal Outcome Evaluation:    Plan of Care Reviewed With: patient      Patient slept soundly for 9 hours. He went to the bathroom once and voided freely. No behavioral issues noted.

## 2022-10-04 NOTE — PLAN OF CARE
"Assessment/Intervention/Current Symptoms and Care Coordination:     -Reviewed pt s chart  -Rounded with team in review of pt's care and plan  -Has been accepted by [facility].  Per : \"[place] for tomorrow @ 11am.   After [place], waiver funding rate will be negotiated with the , then an admission date will be determined. The 's contact is: [email or web address]     Discharge Plan or Goal:   Has been accepted into [facility]. [place]wait[place] on 10[place]5/2022      Barriers to Discharge:  - Funding  -[place]     Referral Status:  - [facility]  - [facility]     Legal Status:  Court Hold     Contact:  Joi Roque  [facility]   [facility]  Phone: [phone]    Fax: [phone]    "

## 2022-10-04 NOTE — PROGRESS NOTES
"PSYCHIATRY  PROGRESS NOTE     DATE OF SERVICE   10/04/2022       CHIEF COMPLAINT   Patient was admitted due to inability to safely care for himself and psychosis.       SUBJECTIVE   Nursing reports:  Patient remains isolative to his room and withdrawn. He came out for dinner via a wheelchair and went back to his room immediately after. Patient's mood is calm with a flat affect. He denies SI/SIB nor hallucinations. He still provides limited answers to questions asked during the 1:1 check-in. He is napping most of the shift. No behavioral issues observed. He remains quiet in his room. No talking to self observed. He is med compliant. He ate 50% of his dinner. He said that he is tired and wants to take a nap. No complaints of pain. His gait is still unsteady so he used a wheelchair in coming out from his room for dinner. His BP was 97/64 P-75. Per notes of the [facility], patient was accepted by [facility]. [place]5/2022 (Pls see progress notes of CTC dated 10/03/22).    Patient has been discussed in detail with the  during team meeting.    Assessment/Intervention/Current Symptoms and Care Coordination:     -Reviewed pt s chart  -Rounded with team in review of pt's care and plan  -Has been accepted by [facility].  Per : \"[place] [place] is scheduled for tomorrow @ 11am.   After [place], waiver funding rate will be negotiated with the , then an admission date will be determined. The 's contact is: [email or web address].     OBJECTIVE   Patient was seen and evaluated at bedside by himself, this was a face-to-face evaluation.  Patient remains at his baseline and no new behaviors have been reported. Continues to be isolative withdrawn and spending most of the time in his room. Denied all psychiatric symptoms, continues to have the same delusions and AH.       MEDICATIONS " "  Medications:  Scheduled Meds:    aspirin  81 mg Oral Daily     benztropine  0.5 mg Oral BID     busPIRone HCl  30 mg Oral BID     cloZAPine  250 mg Oral At Bedtime     donepezil  10 mg Oral At Bedtime     gabapentin  100 mg Oral TID w/meals     levothyroxine  88 mcg Oral Daily     megestrol  20 mg Oral Daily     melatonin  5 mg Oral At Bedtime     memantine  10 mg Oral BID     mirtazapine  15 mg Oral At Bedtime     multivitamin, therapeutic  1 tablet Oral Daily     salmeterol  1 puff Inhalation BID     thiamine  100 mg Oral Daily     Continuous Infusions:  PRN Meds:.albuterol, alum & mag hydroxide-simethicone, hydrOXYzine, nystatin, polyethylene glycol, senna-docusate    Medication adherence issues: MS Med Adherence Y/N: Yes, Hospitalization  Medication side effects: MEDICATION SIDE EFFECTS: no side effects reported  Benefit: Yes / No: Yes       ROS   A comprehensive review of systems was negative.       MENTAL STATUS EXAM   Vitals: BP 97/64 (BP Location: Right arm, Patient Position: Sitting, Cuff Size: Adult Small)   Pulse 75   Temp 97.6  F (36.4  C) (Oral)   Resp 16   Ht 1.778 m (5' 10\")   Wt 78.7 kg (173 lb 8 oz)   SpO2 96%   BMI 24.89 kg/m       Same as last visit  Appearance:  No apparent distress  Mood: \"I am alright\".  Affect: Calm  Suicidal Ideation: Denies  Homicidal Ideation: Denies  Thought process: Disorganized and concrete  Thought content: Remains confused and delusional and hallucinating at times.    Fund of Knowledge: Below average  Attention/Concentration: Poor  Language ability: Significantly impaired  Memory: Global memory impairment  Insight:  Poor.  Judgement: Poor  Orientation: Only to person  Psychomotor Behavior: slowed    Muscle Strength and Tone: MuscleStrength: Normal and Atrophy  Gait and Station: Not evaluated       LABS   personally reviewed.     No results found for: PHENYTOIN, PHENOBARB, VALPROATE, CBMZ       DIAGNOSIS   Principal Problem:    Major neurocognitive disorder, " due to multiple etiologies, with behavioral disturbance, severe    Active Problem List:  Patient Active Problem List   Diagnosis     TBI with aggressive behavior     HTN (hypertension)     COPD (chronic obstructive pulmonary disease) (H)     Dementia with behavioral disturbance     Chronic schizophrenia (H)     Smoker     Agitation     Behavior disturbance     Psychosis (H)     Major neurocognitive disorder, due to multiple etiologies, with behavioral disturbance, severe     Paranoid schizophrenia, chronic condition with acute exacerbation (H)     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 Ongoing education given regarding diagnostic and treatment options with risks, benefits and alternatives and adequate verbalization of understanding.  2.  Medications       BuSpar 30 mg 2 times daily       Cogentin 0.5 mg 2 times a day       Clozaril 250 mg at bedtime       Aricept 10 mg at bedtime       Gabapentin 100 mg 3 times a day       Namenda 10 mg 2 times a day       Remeron 15 mg at bedtime       melatonin 5 mg at bedtime  3.  Medical team following the patient   4.   coordinating a safe discharge plan with the patient.  5.  Labs have been ordered, liver functions remained stable consistent with the hep C diagnosis.      Risk Assessment: [facility] RISK ASSESSMENT: Patient able to contract for safety    Coordination of Care:   Treatment Plan reviewed and physician signed, Care discussed with Care/Treatment Team Members, Chart reviewed and Patient seen      Re-Certification I certify that the inpatient psychiatric facility services furnished since the previous certification were, and continue to be, medically necessary for, either, treatment which could reasonably be expected to improve the patient s condition or diagnostic study and that the  hospital records indicate that the services furnished were, either, intensive treatment services, admission and related services necessary for diagnostic study, or equivalent services.     I certify that the patient continues to need, on a daily basis, active treatment furnished directly by or requiring the supervision of inpatient psychiatric facility personnel.   I estimate 14 days of hospitalization is necessary for proper treatment of the patient. My plans for post-hospital care for this patient are  TBD     Ginger Dubon MD    -     10/04/2022  -     2:08 PM    Total time 15 minutes with > 50%spent on coordination of cares and psycho-education.    This note was created with help of Dragon dictation system. Grammatical / typing errors are not intentional.    Ginger Dubon MD

## 2022-10-04 NOTE — PLAN OF CARE
Problem: Behavior Regulation Impairment (Psychotic Signs/Symptoms)  Goal: Improved Behavioral Control (Psychotic Signs/Symptoms)  Outcome: Ongoing, Not Progressing   Goal Outcome Evaluation:    Plan of Care Reviewed With: patient     Patient remains isolative to his room and withdrawn. He came out for dinner via a wheelchair and went back to his room immediately after. Patient's mood is calm with a flat affect. He denies SI/SIB nor hallucinations. He still provides limited answers to questions asked during the 1:1 check-in. He is napping most of the shift. No behavioral issues observed. He remains quiet in his room. No talking to self observed. He is med compliant. He ate 50% of his dinner. He said that he is tired and wants to take a nap. No complaints of pain. His gait is still unsteady so he used a wheelchair in coming out from his room for dinner. His BP was 97/64 P-75. Per notes of the CTC, patient was accepted by [facility]. [place]5/2022 (Pls see progress notes of CTC dated 10/03/22).

## 2022-10-04 NOTE — PLAN OF CARE
"Goal Outcome Evaluation:    Plan of Care Reviewed With: patient     John was up with standby assistance when agreeable. Pt uses wheel chair to transport from his room down to the dining area on the unit. Writer and staff have been meeting with Pt q 2hrs and PRN for toileting encouragement and assistance. Pt appears to have used the toilet on his own as evidenced by urine in his toilet bowl that he hasn't flushed. Pt was withdrawn and isolated in his room the entire shift except when coming out for breakfast and lunch.    John minimally participated in 1:1 interview, mumbled mostly. When asked how he was feeling, John replied \"I'm tired but ok\"  Pt has not exhibited any dysregulated behavior.     John ate >75% of both meals in addition to drinking fluids. Pt was med adherent. Writer encouraged Pt to shower multiple times this shift, Pt refused.              "

## 2022-10-05 PROCEDURE — 250N000013 HC RX MED GY IP 250 OP 250 PS 637: Performed by: PHYSICIAN ASSISTANT

## 2022-10-05 PROCEDURE — 99231 SBSQ HOSP IP/OBS SF/LOW 25: CPT | Performed by: PSYCHIATRY & NEUROLOGY

## 2022-10-05 PROCEDURE — 250N000013 HC RX MED GY IP 250 OP 250 PS 637: Performed by: PSYCHIATRY & NEUROLOGY

## 2022-10-05 PROCEDURE — 124N000003 HC R&B MH SENIOR/ADOLESCENT

## 2022-10-05 RX ADMIN — BENZTROPINE MESYLATE 0.5 MG: 0.5 TABLET ORAL at 08:30

## 2022-10-05 RX ADMIN — THIAMINE HCL TAB 100 MG 100 MG: 100 TAB at 08:31

## 2022-10-05 RX ADMIN — MIRTAZAPINE 15 MG: 15 TABLET, FILM COATED ORAL at 19:49

## 2022-10-05 RX ADMIN — GABAPENTIN 100 MG: 100 CAPSULE ORAL at 08:31

## 2022-10-05 RX ADMIN — GABAPENTIN 100 MG: 100 CAPSULE ORAL at 12:46

## 2022-10-05 RX ADMIN — GABAPENTIN 100 MG: 100 CAPSULE ORAL at 17:20

## 2022-10-05 RX ADMIN — THERA TABS 1 TABLET: TAB at 08:31

## 2022-10-05 RX ADMIN — LEVOTHYROXINE SODIUM 88 MCG: 0.09 TABLET ORAL at 08:31

## 2022-10-05 RX ADMIN — SALMETEROL XINAFOATE 1 PUFF: 50 POWDER, METERED ORAL; RESPIRATORY (INHALATION) at 08:32

## 2022-10-05 RX ADMIN — SALMETEROL XINAFOATE 1 PUFF: 50 POWDER, METERED ORAL; RESPIRATORY (INHALATION) at 19:55

## 2022-10-05 RX ADMIN — MEMANTINE 10 MG: 10 TABLET ORAL at 19:49

## 2022-10-05 RX ADMIN — MEMANTINE 10 MG: 10 TABLET ORAL at 08:31

## 2022-10-05 RX ADMIN — BUSPIRONE HYDROCHLORIDE 30 MG: 15 TABLET ORAL at 19:49

## 2022-10-05 RX ADMIN — DONEPEZIL HYDROCHLORIDE 10 MG: 10 TABLET ORAL at 19:49

## 2022-10-05 RX ADMIN — Medication 5 MG: at 19:49

## 2022-10-05 RX ADMIN — MEGESTROL ACETATE 20 MG: 20 TABLET ORAL at 08:31

## 2022-10-05 RX ADMIN — BUSPIRONE HYDROCHLORIDE 30 MG: 15 TABLET ORAL at 08:31

## 2022-10-05 RX ADMIN — BENZTROPINE MESYLATE 0.5 MG: 0.5 TABLET ORAL at 19:49

## 2022-10-05 RX ADMIN — CLOZAPINE 250 MG: 100 TABLET ORAL at 19:49

## 2022-10-05 RX ADMIN — ASPIRIN 81 MG: 81 TABLET, COATED ORAL at 08:31

## 2022-10-05 ASSESSMENT — ACTIVITIES OF DAILY LIVING (ADL)
ADLS_ACUITY_SCORE: 77

## 2022-10-05 NOTE — PROGRESS NOTES
"PSYCHIATRY  PROGRESS NOTE     DATE OF SERVICE   10/05/2022       CHIEF COMPLAINT   Patient was admitted due to inability to safely care for himself and psychosis.       SUBJECTIVE   Nursing reports:  Patient remains the same. He is isolative and withdrawn. He was napping majority of the shift. He was observed talking to self. He denied SI/SIB nor hallucinations. Denied wishing himself to be dead. He is alert and oriented to self only. Speech is pressured and tangential. His eye contact is intermittent. He is sleeping good. He ate approximately 75% of his dinner. He is med compliant. BP-97/68; P-98. Denies dizziness. Encouraged to push fluids. He refused to take a shower. Declined to attend groups this shift.    Patient has been discussed in detail with the  during team meeting.  Patient was able to complete the assessment done by the [facility] today.     OBJECTIVE   Patient was seen and evaluated at bedside by himself, this was a face-to-face evaluation.  Patient reported that he is doing good and denied all psychiatric symptoms.  He also asks when he will be able to discharge from the hospital.  I discussed with the patient that once the assessment from the [facility] is completed they are the ones that will let us know when he will be able to leave.  Patient did not seem to fully understand this information but he said \"thank you\" and then asked this writer to leave his room.       MEDICATIONS   Medications:  Scheduled Meds:    aspirin  81 mg Oral Daily     benztropine  0.5 mg Oral BID     busPIRone HCl  30 mg Oral BID     cloZAPine  250 mg Oral At Bedtime     donepezil  10 mg Oral At Bedtime     gabapentin  100 mg Oral TID w/meals     levothyroxine  88 mcg Oral Daily     megestrol  20 mg Oral Daily     melatonin  5 mg Oral At Bedtime     memantine  10 mg Oral BID     mirtazapine  15 mg Oral At Bedtime     multivitamin, therapeutic  1 tablet Oral Daily     salmeterol  1 puff Inhalation BID     thiamine  100 " "mg Oral Daily     Continuous Infusions:  PRN Meds:.albuterol, alum & mag hydroxide-simethicone, hydrOXYzine, nystatin, polyethylene glycol, senna-docusate    Medication adherence issues: MS Med Adherence Y/N: Yes, Hospitalization  Medication side effects: MEDICATION SIDE EFFECTS: no side effects reported  Benefit: Yes / No: Yes       ROS   A comprehensive review of systems was negative.       MENTAL STATUS EXAM   Vitals: /78   Pulse 119   Temp 97  F (36.1  C) (Temporal)   Resp 16   Ht 1.778 m (5' 10\")   Wt 78.7 kg (173 lb 8 oz)   SpO2 100%   BMI 24.89 kg/m       Appearance:  No apparent distress  Mood: \"I am good\".  Affect: Calm  Suicidal Ideation: Denies  Homicidal Ideation: Denies  Thought process: Disorganized and concrete  Thought content: Remains confused and delusional and hallucinating at times.    Fund of Knowledge: Below average  Attention/Concentration: Poor  Language ability: Significantly impaired  Memory: Global memory impairment  Insight:  Poor.  Judgement: Poor  Orientation: Only to person  Psychomotor Behavior: slowed    Muscle Strength and Tone: MuscleStrength: Normal and Atrophy  Gait and Station: Not evaluated       LABS   personally reviewed.     No results found for: PHENYTOIN, PHENOBARB, VALPROATE, CBMZ       DIAGNOSIS   Principal Problem:    Major neurocognitive disorder, due to multiple etiologies, with behavioral disturbance, severe    Active Problem List:  Patient Active Problem List   Diagnosis     TBI with aggressive behavior     HTN (hypertension)     COPD (chronic obstructive pulmonary disease) (H)     Dementia with behavioral disturbance     Chronic schizophrenia (H)     Smoker     Agitation     Behavior disturbance     Psychosis (H)     Major neurocognitive disorder, due to multiple etiologies, with behavioral disturbance, severe     Paranoid schizophrenia, chronic condition with acute exacerbation (H)     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 Ongoing education given regarding diagnostic and treatment options with risks, benefits and alternatives and adequate verbalization of understanding.  2.  Medications       BuSpar 30 mg 2 times daily       Cogentin 0.5 mg 2 times a day       Clozaril 250 mg at bedtime       Aricept 10 mg at bedtime       Gabapentin 100 mg 3 times a day       Namenda 10 mg 2 times a day       Remeron 15 mg at bedtime       melatonin 5 mg at bedtime  3.  Medical team following the patient   4.   coordinating a safe discharge plan with the patient.  5.  Labs have been ordered, liver functions remained stable consistent with the hep C diagnosis.      Risk Assessment: [facility] RISK ASSESSMENT: Patient able to contract for safety    Coordination of Care:   Treatment Plan reviewed and physician signed, Care discussed with Care/Treatment Team Members, Chart reviewed and Patient seen      Re-Certification I certify that the inpatient psychiatric facility services furnished since the previous certification were, and continue to be, medically necessary for, either, treatment which could reasonably be expected to improve the patient s condition or diagnostic study and that the hospital records indicate that the services furnished were, either, intensive treatment services, admission and related services necessary for diagnostic study, or equivalent services.     I certify that the patient continues to need, on a daily basis, active treatment furnished directly by or requiring the supervision of inpatient psychiatric facility personnel.   I estimate 14 days of hospitalization is necessary for proper treatment of the patient. My plans for post-hospital care for this patient are  TBD     Ginger Dubon MD    -     10/05/2022  -     11:46 AM    Total time 15 minutes with >  50%spent on coordination of cares and psycho-education.    This note was created with help of Dragon dictation system. Grammatical / typing errors are not intentional.    Ginger Dubon MD

## 2022-10-05 NOTE — PLAN OF CARE
Problem: Sleep Disturbance (Psychotic Signs/Symptoms)  Goal: Improved Sleep (Psychotic Signs/Symptoms)  Outcome: Ongoing, Progressing   Goal Outcome Evaluation:      The patient appeared asleep at the beginning of the shift. There was no pain or medical concerns reported or observed. Pt slept 8.5 hours during this shift. Will continue to monitor.

## 2022-10-05 NOTE — PLAN OF CARE
"Assessment/Intervention/Current Symptoms and Care Coordination:     -Reviewed pt s chart  -Rounded with team in review of pt's care and plan  -Has been accepted by [facility].  -[place] Celia. His re[place]ayee has also been reactivated. Rates are being negotiated at this time.  -Communicated with [facility] director and she clarified as follows:  \"Ezra Zuniga,  While the Director of Clinical Services works with Celia on the rate, I thought I would send you a list of admission items that I will need from you.  I am hoping we can get him moved in next week but of course this is dependent on the rate being approved.    Signed physician orders to admit - Please fax to [facility] [phone]  Current H&P faxed to [facility] at [phone]  Signed med list - Please fax to [facility] at [phone] and to [facility] at [phone]  Orders for PT  Medications for 2-3 days (sent with John)  DME: Wheelchair and hospital bed (if needed).  Negative COVID test (must be completed within 48 hours of admission)       If you need the address to the community it is as follows:  [facility], [address] [place] [id number].         Let me know if you have any questions, I am hoping to have an admission date for you by tomorrow at the latest.         Sincerely,    Jo Mcgraw      [facility]  Assisted Living & Memory Care    [place]    Cell: [phone]    Fax: [phone]    Email:  [email or web address]    Web: [email or web address]         Discharge Plan or Goal:   Has been accepted into [facility]. Awaiting [place] assessment on 10/5/2022      Barriers to Discharge:  - Funding  -[place] to [place]iously done     Referral Status:  - [facility]  - [facility]" Communities     Legal Status:  Court Hold     Contact:  Joi Roque  [facility]   [facility]  Phone: [phone]    Fax: [phone]

## 2022-10-05 NOTE — PLAN OF CARE
Goal Outcome Evaluation:    Flat affect, mood is calm, pt minimally social with staff. Pt smells of urine, pt declines to shower at this time, will re-approach. Bedding changed. Pt out for meals. Fluids encouraged. Pt encouraged to toilet every 2 hours, pt often declines. Pt took all scheduled medications.     Plan:  Continue to encourage fluids and increased activity  Continue to encourage group participation  Continue to encourage choices and independence  Continue to encourage non-pharmacological coping skills

## 2022-10-06 PROCEDURE — 250N000013 HC RX MED GY IP 250 OP 250 PS 637: Performed by: PSYCHIATRY & NEUROLOGY

## 2022-10-06 PROCEDURE — 99231 SBSQ HOSP IP/OBS SF/LOW 25: CPT | Performed by: PSYCHIATRY & NEUROLOGY

## 2022-10-06 PROCEDURE — 250N000013 HC RX MED GY IP 250 OP 250 PS 637: Performed by: PHYSICIAN ASSISTANT

## 2022-10-06 PROCEDURE — 124N000003 HC R&B MH SENIOR/ADOLESCENT

## 2022-10-06 RX ADMIN — MIRTAZAPINE 15 MG: 15 TABLET, FILM COATED ORAL at 21:05

## 2022-10-06 RX ADMIN — BENZTROPINE MESYLATE 0.5 MG: 0.5 TABLET ORAL at 08:18

## 2022-10-06 RX ADMIN — GABAPENTIN 100 MG: 100 CAPSULE ORAL at 12:35

## 2022-10-06 RX ADMIN — CLOZAPINE 250 MG: 100 TABLET ORAL at 21:05

## 2022-10-06 RX ADMIN — LEVOTHYROXINE SODIUM 88 MCG: 0.09 TABLET ORAL at 08:18

## 2022-10-06 RX ADMIN — SALMETEROL XINAFOATE 1 PUFF: 50 POWDER, METERED ORAL; RESPIRATORY (INHALATION) at 21:06

## 2022-10-06 RX ADMIN — DONEPEZIL HYDROCHLORIDE 10 MG: 10 TABLET ORAL at 21:05

## 2022-10-06 RX ADMIN — MEMANTINE 10 MG: 10 TABLET ORAL at 08:19

## 2022-10-06 RX ADMIN — BUSPIRONE HYDROCHLORIDE 30 MG: 15 TABLET ORAL at 21:05

## 2022-10-06 RX ADMIN — MEMANTINE 10 MG: 10 TABLET ORAL at 21:05

## 2022-10-06 RX ADMIN — ASPIRIN 81 MG: 81 TABLET, COATED ORAL at 08:19

## 2022-10-06 RX ADMIN — BENZTROPINE MESYLATE 0.5 MG: 0.5 TABLET ORAL at 21:05

## 2022-10-06 RX ADMIN — POLYETHYLENE GLYCOL 3350 17 G: 17 POWDER, FOR SOLUTION ORAL at 17:18

## 2022-10-06 RX ADMIN — THERA TABS 1 TABLET: TAB at 08:18

## 2022-10-06 RX ADMIN — THIAMINE HCL TAB 100 MG 100 MG: 100 TAB at 08:19

## 2022-10-06 RX ADMIN — MEGESTROL ACETATE 20 MG: 20 TABLET ORAL at 08:19

## 2022-10-06 RX ADMIN — SALMETEROL XINAFOATE 1 PUFF: 50 POWDER, METERED ORAL; RESPIRATORY (INHALATION) at 08:19

## 2022-10-06 RX ADMIN — Medication 5 MG: at 21:05

## 2022-10-06 RX ADMIN — BUSPIRONE HYDROCHLORIDE 30 MG: 15 TABLET ORAL at 08:18

## 2022-10-06 RX ADMIN — GABAPENTIN 100 MG: 100 CAPSULE ORAL at 17:17

## 2022-10-06 RX ADMIN — GABAPENTIN 100 MG: 100 CAPSULE ORAL at 08:19

## 2022-10-06 ASSESSMENT — ACTIVITIES OF DAILY LIVING (ADL)
ADLS_ACUITY_SCORE: 77

## 2022-10-06 NOTE — PLAN OF CARE
"  Problem: Cognitive Impairment (Psychotic Signs/Symptoms)  Goal: Optimal Cognitive Function (Psychotic Signs/Symptoms)  Intervention: Support and Promote Cognitive Ability  Recent Flowsheet Documentation  Taken 10/6/2022 1439 by Wen Broderick RN  Trust Relationship/Rapport:   care explained   choices provided   emotional support provided   questions answered   questions encouragedNursing Assessment    Psychosis (H) [F29]    Admit Date: 1/26/2022    Length of Stay: 253    Patient evaluation continues. Assessed mood,anxiety,thoughts and behavior. Patient is  progressing towards goals. Patient is encouraged to participate in groups and assisted to develop healthy coping skills.  Patient admits to auditory  hallucinations which are not commanding or negative./72   Pulse 112   Temp 98.2  F (36.8  C)   Resp 16   Ht 1.778 m (5' 10\")   Wt 78.7 kg (173 lb 8 oz)   SpO2 100%   BMI 24.89 kg/m      Mood: good    Patient reports depression no and reports anxiety no    Affect:flat blunted    Sleep: good ,naps during the day    Appetite: good 75%    SI: denies    HI: denies    SIB: denies      Medication Compliance: yes    Group participation: No except bingo    ADL's: assisted as pt allows    Fall risk interventions: proper foot wear, orthostatic B/P, standby assist, wheel chair    Rinku Score Interventions: none    Discharge planning in process    Refer to daily team meeting notes for individualized plan of care. Nursing will continue to assess.    *Scale is 1-10 and 10 is the worst.          thoughts/feelings acknowledged   Goal Outcome Evaluation:    Plan of Care Reviewed With: patient                 "

## 2022-10-06 NOTE — PROGRESS NOTES
PSYCHIATRY  PROGRESS NOTE     DATE OF SERVICE   10/06/2022       CHIEF COMPLAINT   Patient was admitted due to inability to safely care for himself and psychosis.       SUBJECTIVE   Nursing reports:  Patient remains isolative and withdrawn. He doesn't initiate conversation. Refusing to go to groups. He stays in bed throughout the shift and comes out only during mealtime. His thoughts remains disorganized. With intermittent eye contact. His mood is calm but with flat affect. Only provides limited answers to questions asked mostly, those which are answerable by yes or no or nods his head. He denies SI/SIB nor hallucinations. Talking to self not observed this shift. He is med compliant. Ate 75% of his dinner. He went to the bathroom x 2 as claimed and voided freely to clear, franko-colored urine. His BP- 104/72; P-98.    Patient has been discussed in detail with the  during team meeting.  We are still waiting to hear from the county about when the patient will be able to leave the hospital.     OBJECTIVE   Patient was seen and evaluated at bedside by himself, this was a face-to-face evaluation.  Patient continues to be at his baseline and there has been no new behaviors.  Patient stated that he is doing okay and denied all psychiatric symptoms.  Remains isolative, delusional and hallucinating at times.       MEDICATIONS   Medications:  Scheduled Meds:    aspirin  81 mg Oral Daily     benztropine  0.5 mg Oral BID     busPIRone HCl  30 mg Oral BID     cloZAPine  250 mg Oral At Bedtime     donepezil  10 mg Oral At Bedtime     gabapentin  100 mg Oral TID w/meals     levothyroxine  88 mcg Oral Daily     megestrol  20 mg Oral Daily     melatonin  5 mg Oral At Bedtime     memantine  10 mg Oral BID     mirtazapine  15 mg Oral At Bedtime     multivitamin, therapeutic  1 tablet Oral Daily     salmeterol  1 puff Inhalation BID     thiamine  100 mg Oral Daily     Continuous Infusions:  PRN Meds:.albuterol, alum & mag  "hydroxide-simethicone, hydrOXYzine, nystatin, polyethylene glycol, senna-docusate    Medication adherence issues: [place] Med Adherence Y/N: Yes, Hospitalization  Medication side effects: MEDICATION SIDE EFFECTS: no side effects reported  Benefit: Yes / No: Yes       ROS   A comprehensive review of systems was negative.       MENTAL STATUS EXAM   Vitals: /72   Pulse 112   Temp 98.2  F (36.8  C)   Resp 16   Ht 1.778 m (5' 10\")   Wt 78.7 kg (173 lb 8 oz)   SpO2 100%   BMI 24.89 kg/m       Appearance:  No apparent distress  Mood: \"I am okay\".  Affect: Calm  Suicidal Ideation: Denies  Homicidal Ideation: Denies  Thought process: Disorganized and concrete  Thought content: Remains confused and delusional and hallucinating at times.    Fund of Knowledge: Below average  Attention/Concentration: Poor  Language ability: Significantly impaired  Memory: Global memory impairment  Insight:  Poor.  Judgement: Poor  Orientation: Only to person  Psychomotor Behavior: slowed    Muscle Strength and Tone: MuscleStrength: Normal and Atrophy  Gait and Station: Not evaluated       LABS   personally reviewed.     No results found for: PHENYTOIN, PHENOBARB, VALPROATE, CBMZ       DIAGNOSIS   Principal Problem:    Major neurocognitive disorder, due to multiple etiologies, with behavioral disturbance, severe    Active Problem List:  Patient Active Problem List   Diagnosis     TBI with aggressive behavior     HTN (hypertension)     COPD (chronic obstructive pulmonary disease) (H)     Dementia with behavioral disturbance     Chronic schizophrenia (H)     Smoker     Agitation     Behavior disturbance     Psychosis (H)     Major neurocognitive disorder, due to multiple etiologies, with behavioral disturbance, severe     Paranoid schizophrenia, chronic condition with acute exacerbation (H)     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 Ongoing education given regarding diagnostic and treatment options with risks, benefits and alternatives and adequate verbalization of understanding.  2.  Medications       BuSpar 30 mg 2 times daily       Cogentin 0.5 mg 2 times a day       Clozaril 250 mg at bedtime       Aricept 10 mg at bedtime       Gabapentin 100 mg 3 times a day       Namenda 10 mg 2 times a day       Remeron 15 mg at bedtime       melatonin 5 mg at bedtime  3.  Medical team following the patient   4.   coordinating a safe discharge plan with the patient.  5.  Labs have been ordered, liver functions remained stable consistent with the hep C diagnosis.      Risk Assessment: [facility] RISK ASSESSMENT: Patient able to contract for safety    Coordination of Care:   Treatment Plan reviewed and physician signed, Care discussed with Care/Treatment Team Members, Chart reviewed and Patient seen      Re-Certification I certify that the inpatient psychiatric facility services furnished since the previous certification were, and continue to be, medically necessary for, either, treatment which could reasonably be expected to improve the patient s condition or diagnostic study and that the hospital records indicate that the services furnished were, either, intensive treatment services, admission and related services necessary for diagnostic study, or equivalent services.     I certify that the patient continues to need, on a daily basis, active treatment furnished directly by or requiring the supervision of inpatient psychiatric facility personnel.   I estimate 14 days of hospitalization is necessary for proper treatment of the patient. My plans for post-hospital care for this patient are  TBD     Ginger Dubon MD    -     10/06/2022  -     1:22 PM    Total time 15 minutes with > 50%spent on coordination of cares and psycho-education.    This note was created with  help of Dragon dictation system. Grammatical / typing errors are not intentional.    Ginger Dubon MD

## 2022-10-06 NOTE — PLAN OF CARE
Goal Outcome Evaluation:    Plan of Care Reviewed With: patient      Patient slept well the whole night for 9.75 hours. Nothing unusual noted. No concerns raised. No behavioral issues observed.

## 2022-10-06 NOTE — PLAN OF CARE
Problem: Behavior Regulation Impairment (Psychotic Signs/Symptoms)  Goal: Improved Behavioral Control (Psychotic Signs/Symptoms)  Outcome: Ongoing, Not Progressing   Goal Outcome Evaluation:    Plan of Care Reviewed With: patient     Patient remains isolative and withdrawn. He doesn't initiate conversation. Refusing to go to groups. He stays in bed throughout the shift and comes out only during mealtime. His thoughts remains disorganized. With intermittent eye contact. His mood is calm but with flat affect. Only provides limited answers to questions asked mostly, those which are answerable by yes or no or nods his head. He denies SI/SIB nor hallucinations. Talking to self not observed this shift. He is med compliant. Ate 75% of his dinner. He went to the bathroom x 2 as claimed and voided freely to clear, franko-colored urine. His BP- 104/72; P-98.

## 2022-10-07 LAB
BASOPHILS # BLD AUTO: 0.1 10E3/UL (ref 0–0.2)
BASOPHILS NFR BLD AUTO: 2 %
EOSINOPHIL # BLD AUTO: 1.4 10E3/UL (ref 0–0.7)
EOSINOPHIL NFR BLD AUTO: 25 %
ERYTHROCYTE [DISTWIDTH] IN BLOOD BY AUTOMATED COUNT: 13.7 % (ref 10–15)
HCT VFR BLD AUTO: 42.7 % (ref 40–53)
HGB BLD-MCNC: 15 G/DL (ref 13.3–17.7)
HOLD SPECIMEN: NORMAL
IMM GRANULOCYTES # BLD: 0 10E3/UL
IMM GRANULOCYTES NFR BLD: 0 %
LYMPHOCYTES # BLD AUTO: 1.4 10E3/UL (ref 0.8–5.3)
LYMPHOCYTES NFR BLD AUTO: 24 %
MCH RBC QN AUTO: 32.3 PG (ref 26.5–33)
MCHC RBC AUTO-ENTMCNC: 35.1 G/DL (ref 31.5–36.5)
MCV RBC AUTO: 92 FL (ref 78–100)
MONOCYTES # BLD AUTO: 0.5 10E3/UL (ref 0–1.3)
MONOCYTES NFR BLD AUTO: 9 %
NEUTROPHILS # BLD AUTO: 2.2 10E3/UL (ref 1.6–8.3)
NEUTROPHILS NFR BLD AUTO: 40 %
NRBC # BLD AUTO: 0 10E3/UL
NRBC BLD AUTO-RTO: 0 /100
PLATELET # BLD AUTO: 150 10E3/UL (ref 150–450)
RBC # BLD AUTO: 4.65 10E6/UL (ref 4.4–5.9)
WBC # BLD AUTO: 5.7 10E3/UL (ref 4–11)

## 2022-10-07 PROCEDURE — 250N000013 HC RX MED GY IP 250 OP 250 PS 637: Performed by: PHYSICIAN ASSISTANT

## 2022-10-07 PROCEDURE — 99231 SBSQ HOSP IP/OBS SF/LOW 25: CPT | Performed by: PSYCHIATRY & NEUROLOGY

## 2022-10-07 PROCEDURE — 124N000003 HC R&B MH SENIOR/ADOLESCENT

## 2022-10-07 PROCEDURE — 36415 COLL VENOUS BLD VENIPUNCTURE: CPT | Performed by: PSYCHIATRY & NEUROLOGY

## 2022-10-07 PROCEDURE — 250N000013 HC RX MED GY IP 250 OP 250 PS 637: Performed by: PSYCHIATRY & NEUROLOGY

## 2022-10-07 PROCEDURE — 85025 COMPLETE CBC W/AUTO DIFF WBC: CPT | Performed by: PSYCHIATRY & NEUROLOGY

## 2022-10-07 RX ADMIN — BENZTROPINE MESYLATE 0.5 MG: 0.5 TABLET ORAL at 08:30

## 2022-10-07 RX ADMIN — SALMETEROL XINAFOATE 1 PUFF: 50 POWDER, METERED ORAL; RESPIRATORY (INHALATION) at 08:30

## 2022-10-07 RX ADMIN — ASPIRIN 81 MG: 81 TABLET, COATED ORAL at 08:30

## 2022-10-07 RX ADMIN — BUSPIRONE HYDROCHLORIDE 30 MG: 15 TABLET ORAL at 19:10

## 2022-10-07 RX ADMIN — GABAPENTIN 100 MG: 100 CAPSULE ORAL at 08:30

## 2022-10-07 RX ADMIN — GABAPENTIN 100 MG: 100 CAPSULE ORAL at 17:19

## 2022-10-07 RX ADMIN — THERA TABS 1 TABLET: TAB at 08:30

## 2022-10-07 RX ADMIN — DONEPEZIL HYDROCHLORIDE 10 MG: 10 TABLET ORAL at 19:10

## 2022-10-07 RX ADMIN — GABAPENTIN 100 MG: 100 CAPSULE ORAL at 12:24

## 2022-10-07 RX ADMIN — Medication 5 MG: at 19:10

## 2022-10-07 RX ADMIN — BUSPIRONE HYDROCHLORIDE 30 MG: 15 TABLET ORAL at 08:30

## 2022-10-07 RX ADMIN — LEVOTHYROXINE SODIUM 88 MCG: 0.09 TABLET ORAL at 08:30

## 2022-10-07 RX ADMIN — MEMANTINE 10 MG: 10 TABLET ORAL at 08:30

## 2022-10-07 RX ADMIN — MIRTAZAPINE 15 MG: 15 TABLET, FILM COATED ORAL at 19:10

## 2022-10-07 RX ADMIN — BENZTROPINE MESYLATE 0.5 MG: 0.5 TABLET ORAL at 19:10

## 2022-10-07 RX ADMIN — MEGESTROL ACETATE 20 MG: 20 TABLET ORAL at 08:30

## 2022-10-07 RX ADMIN — THIAMINE HCL TAB 100 MG 100 MG: 100 TAB at 08:30

## 2022-10-07 RX ADMIN — SALMETEROL XINAFOATE 1 PUFF: 50 POWDER, METERED ORAL; RESPIRATORY (INHALATION) at 19:11

## 2022-10-07 RX ADMIN — MEMANTINE 10 MG: 10 TABLET ORAL at 19:10

## 2022-10-07 RX ADMIN — CLOZAPINE 250 MG: 100 TABLET ORAL at 19:09

## 2022-10-07 ASSESSMENT — ACTIVITIES OF DAILY LIVING (ADL)
ADLS_ACUITY_SCORE: 77

## 2022-10-07 NOTE — PLAN OF CARE
"  Problem: Cognitive Impairment (Psychotic Signs/Symptoms)  Goal: Optimal Cognitive Function (Psychotic Signs/Symptoms)  Intervention: Support and Promote Cognitive Ability  Recent Flowsheet Documentation  Taken 10/7/2022 1351 by Wen Broderick RN  Trust Relationship/Rapport:   care explained   choices provided   emotional support providedNursing Assessment    Psychosis (H) [F29]    Admit Date: 1/26/2022    Length of Stay: 254    Patient evaluation continues. Assessed mood,anxiety,thoughts and behavior. Patient is  progressing towards goals. Patient is encouraged to participate in groups and assisted to develop healthy coping skills.  Patient admits  auditory hallucinations. /68 (BP Location: Right arm, Patient Position: Left side, Cuff Size: Adult Regular)   Pulse 110   Temp 97.2  F (36.2  C) (Oral)   Resp 16   Ht 1.778 m (5' 10\")   Wt 78.7 kg (173 lb 8 oz)   SpO2 98%   BMI 24.89 kg/m      Mood:good    Patient reports depression none and reports anxiety none    Affect: flat blunted    Sleep: good naps during the day    Appetite: good    SI: denies    HI: denies    SIB: denies      Medication Compliance yes    Group participation:no    ADL's: assisted by staff as pt allows    Fall risk interventions: proper foot wear, orthostatic B/P, standby assist    Rinku Score Interventions: none    Discharge planning in process for next Wednesday    Refer to daily team meeting notes for individualized plan of care. Nursing will continue to assess.    *Scale is 1-10 and 10 is the worst.          questions answered   questions encouraged   thoughts/feelings acknowledged   Goal Outcome Evaluation:    Plan of Care Reviewed With: patient                 "

## 2022-10-07 NOTE — PLAN OF CARE
Problem: Sleep Disturbance  Goal: Adequate Sleep/Rest  Outcome: Ongoing, Progressing          Patient slept through the night for a total of 10 hours. No acute distress was noted. Routine 15 minutes checks done. Patient was noted to be sleeping throughout. No complaints or concerns at this time. No prn was given. CBC with platelet differential scheduled today. Patient continues on fall precaution. Will continue plan of care.

## 2022-10-07 NOTE — PLAN OF CARE
Assessment/Intervention/Current Symptoms and Care Coordination:     -Reviewed pt s chart  -Rounded with team in review of pt's care and plan  -Has been accepted by [facility].  -Working towards discharge as plans are being put in place between [place] and case management.     Signed physician orders to admit - Please fax to Melissa Ulloa [phone]  Current H&P faxed to [facility] at [phone]  Signed med list - Please fax to [facility] at [phone] and to [facility] at [phone]  Orders for PT  Medications for 2-3 days (sent with John)  DME: Wheelchair and hospital bed (if needed).  Negative COVID test (must be completed within 48 hours of admission)         [facility] Contact:  Jo Mcgraw    [facility]  Assisted Living & Memory Care  [place]  Cell: [phone]  Fax: [phone]  Email:  [email or web address]  Web: [email or web address]           Discharge Plan or Goal:   Has been accepted into [facility]. Awaiting [place] assessment on 10/5/2022      Barriers to Discharge:  - Funding  -[place] Choice to [place] exp[place]iously done     Referral Status:  - [facility]  - [facility]     Legal Status:  Court Hold     Contact:  Joi Roque  [facility]   [facility] - Comprehensive Care Advoc[facility]  Phone: [phone]    Fax: [phone]

## 2022-10-07 NOTE — PLAN OF CARE
Assessment/Intervention/Current Symptoms and Care Coordination:     -Reviewed pt s chart  -Rounded with team in review of pt's care and plan  -[facility] - coordinated with the home regarding possible discharge, next week  -Sent paperwork for longterm medication management to guardian to sign for:  [facility]  Jo Gurrola  Mental Health   [facility]  [address] [place] [address]  [email or web address].  Main: [phone]  EXT: [phone]  FAX: [phone]       Signed physician orders to admit - Please fax to [facility] [phone]  Current H&P faxed to [facility] at [phone]  Signed med list - Please fax to [facility] at [phone] and to [facility] at [phone]  Orders for PT  Medications for 2-3 days (sent with John)  DME: Wheelchair and hospital bed (if needed).  Negative COVID test (must be completed within 48 hours of admission)         [facility] Contact:  Jo Mcgraw    [facility]  Assisted Living & Memory Care  [place]  Cell: [phone]  Fax: [phone]  Email:  [email or web address]  Web: [email or web address]           Discharge Plan or Goal:   Has been accepted into [facility]. Awaiting [place] Choice assessment on 10/5/2022      Barriers to Discharge:  - Funding  -[place] to be expeditiously done     Referral Status:  - [facility]  - [facility]     Legal Status:  Court Hold     Contact:  Joi Roque  [facility]   [facility] - Comprehensive Care Advocacy  Phone: [phone]    Fax: [phone]

## 2022-10-07 NOTE — PROGRESS NOTES
"PSYCHIATRY  PROGRESS NOTE     DATE OF SERVICE   10/07/2022       CHIEF COMPLAINT   Patient was admitted due to inability to safely care for himself and psychosis.       SUBJECTIVE   Nursing reports:  Plan of Care Reviewed With: patient      Pt isolated in his room. Was quite, withdrawn, and guarded. Was out for dinner. Ate 50%. Fluid intake was 220 ml. Encouraged to attend music group but declined. Was cooperative, med. And treatment compliant. Denied all psych symptoms. Contracted for safety. Reported that his last BM was 3 days ago. PRN Miralax administered. No results reported. Will continue to monitor. No verbal outbursts and no behavioral issues noted.       /66 (Patient Position: Supine, Cuff Size: Adult Small)   Pulse 93   Temp 97.8  F (36.6  C) (Temporal)   Resp 16   Ht 1.778 m (5' 10\")   Wt 78.7 kg (173 lb 8 oz)   SpO2 96%   BMI 24.89 kg/m      Patient has been discussed in detail with the  during team meeting.   informed me that there is a possibility for the patient to leave next Wednesday at 10 AM.  All medications already have been sent to the pharmacy.     OBJECTIVE   Patient was seen and evaluated at bedside by himself, this was a face-to-face evaluation.  Patient remains stable and no new behaviors have been reported.  Patient denied all psychiatric symptoms and has been able to contract for safety.  I discussed with the patient his tentative discharge plans but he does not seem to fully understand this information.       MEDICATIONS   Medications:  Scheduled Meds:    aspirin  81 mg Oral Daily     benztropine  0.5 mg Oral BID     busPIRone HCl  30 mg Oral BID     cloZAPine  250 mg Oral At Bedtime     donepezil  10 mg Oral At Bedtime     gabapentin  100 mg Oral TID w/meals     levothyroxine  88 mcg Oral Daily     megestrol  20 mg Oral Daily     melatonin  5 mg Oral At Bedtime     memantine  10 mg Oral BID     mirtazapine  15 mg Oral At Bedtime     multivitamin, " "therapeutic  1 tablet Oral Daily     salmeterol  1 puff Inhalation BID     thiamine  100 mg Oral Daily     Continuous Infusions:  PRN Meds:.albuterol, alum & mag hydroxide-simethicone, hydrOXYzine, nystatin, polyethylene glycol, senna-docusate    Medication adherence issues: MS Med Adherence Y/N: Yes, Hospitalization  Medication side effects: MEDICATION SIDE EFFECTS: no side effects reported  Benefit: Yes / No: Yes       ROS   A comprehensive review of systems was negative.       MENTAL STATUS EXAM   Vitals: /68 (BP Location: Right arm, Patient Position: Left side, Cuff Size: Adult Regular)   Pulse 110   Temp 97.2  F (36.2  C) (Oral)   Resp 16   Ht 1.778 m (5' 10\")   Wt 78.7 kg (173 lb 8 oz)   SpO2 98%   BMI 24.89 kg/m       Same as last visit  Appearance:  No apparent distress  Mood: \"I am good\".  Affect: Calm  Suicidal Ideation: Denies  Homicidal Ideation: Denies  Thought process: Disorganized and concrete  Thought content: Remains confused and delusional and hallucinating at times.    Fund of Knowledge: Below average  Attention/Concentration: Poor  Language ability: Significantly impaired  Memory: Global memory impairment  Insight:  Poor.  Judgement: Poor  Orientation: Only to person  Psychomotor Behavior: slowed    Muscle Strength and Tone: MuscleStrength: Normal and Atrophy  Gait and Station: Not evaluated       LABS   personally reviewed.     No results found for: PHENYTOIN, PHENOBARB, VALPROATE, CBMZ       DIAGNOSIS   Principal Problem:    Major neurocognitive disorder, due to multiple etiologies, with behavioral disturbance, severe    Active Problem List:  Patient Active Problem List   Diagnosis     TBI with aggressive behavior     HTN (hypertension)     COPD (chronic obstructive pulmonary disease) (H)     Dementia with behavioral disturbance     Chronic schizophrenia (H)     Smoker     Agitation     Behavior disturbance     Psychosis (H)     Major neurocognitive disorder, due to multiple " etiologies, with behavioral disturbance, severe     Paranoid schizophrenia, chronic condition with acute exacerbation (H)     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 Ongoing education given regarding diagnostic and treatment options with risks, benefits and alternatives and adequate verbalization of understanding.  2.  Medications       BuSpar 30 mg 2 times daily       Cogentin 0.5 mg 2 times a day       Clozaril 250 mg at bedtime       Aricept 10 mg at bedtime       Gabapentin 100 mg 3 times a day       Namenda 10 mg 2 times a day       Remeron 15 mg at bedtime       melatonin 5 mg at bedtime  3.  Medical team following the patient   4.   coordinating a safe discharge plan with the patient.  5.  Labs have been ordered, liver functions remained stable consistent with the hep C diagnosis.      Risk Assessment: [facility] RISK ASSESSMENT: Patient able to contract for safety    Coordination of Care:   Treatment Plan reviewed and physician signed, Care discussed with Care/Treatment Team Members, Chart reviewed and Patient seen      Re-Certification I certify that the inpatient psychiatric facility services furnished since the previous certification were, and continue to be, medically necessary for, either, treatment which could reasonably be expected to improve the patient s condition or diagnostic study and that the hospital records indicate that the services furnished were, either, intensive treatment services, admission and related services necessary for diagnostic study, or equivalent services.     I certify that the patient continues to need, on a daily basis, active treatment furnished directly by or requiring the supervision of inpatient psychiatric facility personnel.   I estimate 14 days of hospitalization is necessary for  proper treatment of the patient. My plans for post-hospital care for this patient are  TBD     Ginger Dubon MD    -     10/07/2022  -     12:50 PM    Total time 15 minutes with > 50%spent on coordination of cares and psycho-education.    This note was created with help of Dragon dictation system. Grammatical / typing errors are not intentional.    Ginger Dubon MD

## 2022-10-07 NOTE — PLAN OF CARE
"  Problem: Behavior Regulation Impairment (Psychotic Signs/Symptoms)  Goal: Improved Behavioral Control (Psychotic Signs/Symptoms)  Outcome: Ongoing, Progressing   Goal Outcome Evaluation:    Plan of Care Reviewed With: patient     Pt isolated in his room. Was quite, withdrawn, and guarded. Was out for dinner. Ate 50%. Fluid intake was 220 ml. Encouraged to attend music group but declined. Was cooperative, med. And treatment compliant. Denied all psych symptoms. Contracted for safety. Reported that his last BM was 3 days ago. PRN Miralax administered. No results reported. Will continue to monitor. No verbal outbursts and no behavioral issues noted.      /66 (Patient Position: Supine, Cuff Size: Adult Small)   Pulse 93   Temp 97.8  F (36.6  C) (Temporal)   Resp 16   Ht 1.778 m (5' 10\")   Wt 78.7 kg (173 lb 8 oz)   SpO2 96%   BMI 24.89 kg/m        "

## 2022-10-07 NOTE — PLAN OF CARE
10/07/22 1011   Individualization/Patient Specific Goals   Patient Personal Strengths Resilient; Improvements in cooperating with directions    Patient Vulnerabilities lacks insight into illness   Anxieties, Fears or Concerns Patient lacks insight into his illness. He is at baseline with delusions and hallucinations.   Individualized Care Needs Patient is on a commitment/pittman and guardianship has been determined. Continue to encourage food, hygiene, socializing/cominig out to the lounge, taking walks on the unit.   Patient-Specific Goals (Include Timeframe) Continue with plan for stabilization 10-15 days   Interprofessional Rounds   Summary Patient continues to be stable at baseline. Patient has delusions and hallucinations. Patient is waiting for placement. He has been accepted into [facility]. Now [place] following which he can be discharged to [facility], once the  and [facility] agree on a rate.   Participants nursing;psychiatrist;CTC;OT   Team Discussion   Participants Dr. Dk MD; FIFI Rivera, Wen Broderick RN; Radha Licona OTR; LASHAY Liocna.; Daphne Bernstein RN   Progress Making progress.   Anticipated length of stay 10-14 days   Continued Stay Criteria/Rationale Patient can not discharge safely. He needs assistance with all ADLs. Team is waiting on final guardianship ruling. Patient will discharge to a group home/assisted living.   Medical/Physical Please see H and P.   Plan Continue with plan: medication management, psychiatric evaluations, medical evaluations as needed, nursing assessments, milieu therapy, and CTC case management. Order for guardianship has been received. Letters of guardianship have been received. Guardian is established. Pt to discharge to [facility] once [place] assessment is completed and rate is agreed upon by [facility] and [facility] .   Anticipated Discharge Disposition Letters of guardianship received. Referrals to  nursing homes are ongoing.       PRECAUTIONS AND SAFETY               Behavioral Orders   Procedures     Code 1 - Restrict to Unit     Code 2       CT scan only     Code 3       Patient can go out of the unit with staff supervision. He needs to be taken out by him self with 2 staff and security present.     Fall precautions     Routine Programming       As clinically indicated     Status 15       Every 15 minutes.            Safety  Safety WDL: WDL  Patient Location: patient room, own  Observed Behavior: calm  Observed Behavior (Comment): laying in bed  Safety Measures: safety plan reviewed  Diversional Activity: other (see comments) (pt declined to come out of the roonm)  Suicidality: Status 15  Seizure precautions: clutter free environment  Assault: status 15  Elopement Assessment: Distress about legal situation (holds for mental health or criminal)  Elopement Interventions: status 15  Sexual: status 15, private room

## 2022-10-08 PROCEDURE — 250N000013 HC RX MED GY IP 250 OP 250 PS 637: Performed by: PSYCHIATRY & NEUROLOGY

## 2022-10-08 PROCEDURE — 124N000003 HC R&B MH SENIOR/ADOLESCENT

## 2022-10-08 PROCEDURE — 250N000013 HC RX MED GY IP 250 OP 250 PS 637: Performed by: PHYSICIAN ASSISTANT

## 2022-10-08 RX ADMIN — BUSPIRONE HYDROCHLORIDE 30 MG: 15 TABLET ORAL at 08:55

## 2022-10-08 RX ADMIN — DONEPEZIL HYDROCHLORIDE 10 MG: 10 TABLET ORAL at 20:13

## 2022-10-08 RX ADMIN — MIRTAZAPINE 15 MG: 15 TABLET, FILM COATED ORAL at 20:13

## 2022-10-08 RX ADMIN — SALMETEROL XINAFOATE 1 PUFF: 50 POWDER, METERED ORAL; RESPIRATORY (INHALATION) at 20:14

## 2022-10-08 RX ADMIN — MEMANTINE 10 MG: 10 TABLET ORAL at 08:55

## 2022-10-08 RX ADMIN — BENZTROPINE MESYLATE 0.5 MG: 0.5 TABLET ORAL at 20:13

## 2022-10-08 RX ADMIN — GABAPENTIN 100 MG: 100 CAPSULE ORAL at 08:55

## 2022-10-08 RX ADMIN — GABAPENTIN 100 MG: 100 CAPSULE ORAL at 12:29

## 2022-10-08 RX ADMIN — SALMETEROL XINAFOATE 1 PUFF: 50 POWDER, METERED ORAL; RESPIRATORY (INHALATION) at 08:55

## 2022-10-08 RX ADMIN — ASPIRIN 81 MG: 81 TABLET, COATED ORAL at 08:55

## 2022-10-08 RX ADMIN — Medication 5 MG: at 20:13

## 2022-10-08 RX ADMIN — MEMANTINE 10 MG: 10 TABLET ORAL at 20:14

## 2022-10-08 RX ADMIN — BENZTROPINE MESYLATE 0.5 MG: 0.5 TABLET ORAL at 08:55

## 2022-10-08 RX ADMIN — LEVOTHYROXINE SODIUM 88 MCG: 0.09 TABLET ORAL at 08:55

## 2022-10-08 RX ADMIN — BUSPIRONE HYDROCHLORIDE 30 MG: 15 TABLET ORAL at 20:13

## 2022-10-08 RX ADMIN — THIAMINE HCL TAB 100 MG 100 MG: 100 TAB at 08:55

## 2022-10-08 RX ADMIN — MEGESTROL ACETATE 20 MG: 20 TABLET ORAL at 08:55

## 2022-10-08 RX ADMIN — GABAPENTIN 100 MG: 100 CAPSULE ORAL at 17:18

## 2022-10-08 RX ADMIN — CLOZAPINE 250 MG: 100 TABLET ORAL at 20:13

## 2022-10-08 RX ADMIN — THERA TABS 1 TABLET: TAB at 08:55

## 2022-10-08 ASSESSMENT — ACTIVITIES OF DAILY LIVING (ADL)
ADLS_ACUITY_SCORE: 77

## 2022-10-08 NOTE — PLAN OF CARE
"  Problem: Cognitive Impairment (Psychotic Signs/Symptoms)  Goal: Optimal Cognitive Function (Psychotic Signs/Symptoms)  Outcome: Ongoing, Progressing  Intervention: Support and Promote Cognitive Ability  Recent Flowsheet Documentation  Taken 10/7/2022 1728 by Cynthia Patrick, RN  Trust Relationship/Rapport:    care explained    choices provided    questions encouraged    reassurance provided   Goal Outcome Evaluation:    Plan of Care Reviewed With: patient      /83 (BP Location: Right arm, Patient Position: Supine, Cuff Size: Adult Small)   Pulse 97   Temp 97.8  F (36.6  C) (Temporal)   Resp 16   Ht 1.778 m (5' 10\")   Wt 78.7 kg (173 lb 8 oz)   SpO2 97%   BMI 24.89 kg/m     Pt flat and blunted, isolative and withdrawn. Denies SI/SIB/HI. Denies AH/VH. Mostly in room this evening. Came out for dinner and had 50% of dinner and went back to the room. Pt is medication compliant. Did not participated in group, nor socialized with peers. Pt has no behavioral/verbal outtbrust. Calm and cooperative. Discharge plan in progress. Continue with plan of care.           "

## 2022-10-08 NOTE — PROGRESS NOTES
Pt appeared to be sleeping a total of 9.75. No concerns reported or noted this shift. Will continue to monitor.

## 2022-10-08 NOTE — PLAN OF CARE
"  Problem: Mood Impairment (Psychotic Signs/Symptoms)  Goal: Improved Mood Symptoms (Psychotic Signs/Symptoms)  Outcome: Ongoing, Progressing   Goal Outcome Evaluation:  /71 (BP Location: Right arm, Patient Position: Supine, Cuff Size: Adult Small)   Pulse 98   Temp 97.4  F (36.3  C) (Oral)   Resp 16   Ht 1.778 m (5' 10\")   Wt 78.7 kg (173 lb 8 oz)   SpO2 98%   BMI 24.89 kg/m      Pt was in the room laying in bed at the start of the shift. Pt flat, isolative and withdrawn. Denies psychiatric symptoms at this time. Denies pain. Pt refused to come out for dinner, so dinner was taken to the room, had 50%. Refused to socialized. Reminded about shower,but refused. Pt cooperative with medications. Remains in room all evening. No concern. Continue with plan of care.                "

## 2022-10-08 NOTE — PLAN OF CARE
Problem: Mood Impairment (Psychotic Signs/Symptoms)  Goal: Improved Mood Symptoms (Psychotic Signs/Symptoms)  Outcome: Ongoing, Progressing   Goal Outcome Evaluation:    Plan of Care Reviewed With: patient     Patient has been withdrawn and isolative to room only coming out for meals and medications. He appears flat and blunted, denies pain and is medication complaint. Patient was encouraged to shower, declined, linens were changed and pt was given clean scrubs.

## 2022-10-09 PROCEDURE — 250N000013 HC RX MED GY IP 250 OP 250 PS 637: Performed by: PSYCHIATRY & NEUROLOGY

## 2022-10-09 PROCEDURE — 124N000003 HC R&B MH SENIOR/ADOLESCENT

## 2022-10-09 PROCEDURE — 250N000013 HC RX MED GY IP 250 OP 250 PS 637: Performed by: PHYSICIAN ASSISTANT

## 2022-10-09 RX ADMIN — BUSPIRONE HYDROCHLORIDE 30 MG: 15 TABLET ORAL at 19:30

## 2022-10-09 RX ADMIN — SALMETEROL XINAFOATE 1 PUFF: 50 POWDER, METERED ORAL; RESPIRATORY (INHALATION) at 19:30

## 2022-10-09 RX ADMIN — Medication 5 MG: at 19:31

## 2022-10-09 RX ADMIN — GABAPENTIN 100 MG: 100 CAPSULE ORAL at 17:17

## 2022-10-09 RX ADMIN — GABAPENTIN 100 MG: 100 CAPSULE ORAL at 08:57

## 2022-10-09 RX ADMIN — ASPIRIN 81 MG: 81 TABLET, COATED ORAL at 08:57

## 2022-10-09 RX ADMIN — THERA TABS 1 TABLET: TAB at 08:57

## 2022-10-09 RX ADMIN — THIAMINE HCL TAB 100 MG 100 MG: 100 TAB at 08:57

## 2022-10-09 RX ADMIN — MIRTAZAPINE 15 MG: 15 TABLET, FILM COATED ORAL at 19:30

## 2022-10-09 RX ADMIN — DONEPEZIL HYDROCHLORIDE 10 MG: 10 TABLET ORAL at 19:31

## 2022-10-09 RX ADMIN — MEMANTINE 10 MG: 10 TABLET ORAL at 08:57

## 2022-10-09 RX ADMIN — BENZTROPINE MESYLATE 0.5 MG: 0.5 TABLET ORAL at 19:30

## 2022-10-09 RX ADMIN — MEMANTINE 10 MG: 10 TABLET ORAL at 19:31

## 2022-10-09 RX ADMIN — BUSPIRONE HYDROCHLORIDE 30 MG: 15 TABLET ORAL at 08:57

## 2022-10-09 RX ADMIN — LEVOTHYROXINE SODIUM 88 MCG: 0.09 TABLET ORAL at 08:57

## 2022-10-09 RX ADMIN — GABAPENTIN 100 MG: 100 CAPSULE ORAL at 12:28

## 2022-10-09 RX ADMIN — MEGESTROL ACETATE 20 MG: 20 TABLET ORAL at 08:57

## 2022-10-09 RX ADMIN — SALMETEROL XINAFOATE 1 PUFF: 50 POWDER, METERED ORAL; RESPIRATORY (INHALATION) at 08:57

## 2022-10-09 RX ADMIN — BENZTROPINE MESYLATE 0.5 MG: 0.5 TABLET ORAL at 08:57

## 2022-10-09 RX ADMIN — CLOZAPINE 250 MG: 100 TABLET ORAL at 19:31

## 2022-10-09 ASSESSMENT — ACTIVITIES OF DAILY LIVING (ADL)
ADLS_ACUITY_SCORE: 77

## 2022-10-09 NOTE — PLAN OF CARE
Goal Outcome Evaluation:       Patient slept comfortably for the whole night for a total of 10 hours without interruptions. No behavioral issues observed. Nothing unusual noted.

## 2022-10-09 NOTE — PLAN OF CARE
"     Goal Outcome Evaluation:  /74 (BP Location: Right arm, Patient Position: Supine, Cuff Size: Adult Small)   Pulse 92   Temp 97.6  F (36.4  C) (Oral)   Resp 16   Ht 1.778 m (5' 10\")   Wt 78.7 kg (173 lb 8 oz)   SpO2 100%   BMI 24.89 kg/m          Pt flat and blunted. Decline all psychiatric symptoms at this time. Kept saying \"NO\" to all questions asked,including pain. Pt requested for orange juice and was given, fluids was encouraged.He was isolative and withdrawn to the room all shift, and did not attend group therapy. Pt refused to come out for dinner despite several encouragement.Told writer he was not hungry. Writer brought dinner to his room, checked later, pt did not still eat. Juice and cake was left in his room. Pt compliant with medication. Fall precaution, no roommate, frequent toilet scheduled maintained. Continue to monitor patient per care plan.                 "

## 2022-10-09 NOTE — PLAN OF CARE
Goal Outcome Evaluation:    Plan of Care Reviewed With: patient      Flat affect, mood calm, pt in room most of shift, out for meals ate about 50% each meal. Pt drank adequate fluids this shift. Pt encouraged to toilet every 2 hours, pt declined to shower or have bedding changed today. Pt denies questions/concerns at this time.     Plan:  Continue to encourage fluids and increased activity  Continue to encourage group participation  Continue to encourage choices and independence  Continue to encourage non-pharmacological coping skills

## 2022-10-10 PROCEDURE — 250N000013 HC RX MED GY IP 250 OP 250 PS 637: Performed by: PSYCHIATRY & NEUROLOGY

## 2022-10-10 PROCEDURE — 99231 SBSQ HOSP IP/OBS SF/LOW 25: CPT | Performed by: PSYCHIATRY & NEUROLOGY

## 2022-10-10 PROCEDURE — 124N000003 HC R&B MH SENIOR/ADOLESCENT

## 2022-10-10 PROCEDURE — 250N000013 HC RX MED GY IP 250 OP 250 PS 637: Performed by: PHYSICIAN ASSISTANT

## 2022-10-10 RX ADMIN — BUSPIRONE HYDROCHLORIDE 30 MG: 15 TABLET ORAL at 20:39

## 2022-10-10 RX ADMIN — SALMETEROL XINAFOATE 1 PUFF: 50 POWDER, METERED ORAL; RESPIRATORY (INHALATION) at 09:10

## 2022-10-10 RX ADMIN — MIRTAZAPINE 15 MG: 15 TABLET, FILM COATED ORAL at 20:39

## 2022-10-10 RX ADMIN — MEMANTINE 10 MG: 10 TABLET ORAL at 20:39

## 2022-10-10 RX ADMIN — MEMANTINE 10 MG: 10 TABLET ORAL at 09:10

## 2022-10-10 RX ADMIN — THIAMINE HCL TAB 100 MG 100 MG: 100 TAB at 09:10

## 2022-10-10 RX ADMIN — Medication 5 MG: at 20:39

## 2022-10-10 RX ADMIN — THERA TABS 1 TABLET: TAB at 09:10

## 2022-10-10 RX ADMIN — CLOZAPINE 250 MG: 100 TABLET ORAL at 20:38

## 2022-10-10 RX ADMIN — MEGESTROL ACETATE 20 MG: 20 TABLET ORAL at 09:10

## 2022-10-10 RX ADMIN — GABAPENTIN 100 MG: 100 CAPSULE ORAL at 14:20

## 2022-10-10 RX ADMIN — GABAPENTIN 100 MG: 100 CAPSULE ORAL at 18:05

## 2022-10-10 RX ADMIN — DONEPEZIL HYDROCHLORIDE 10 MG: 10 TABLET ORAL at 20:39

## 2022-10-10 RX ADMIN — ASPIRIN 81 MG: 81 TABLET, COATED ORAL at 09:10

## 2022-10-10 RX ADMIN — SALMETEROL XINAFOATE 1 PUFF: 50 POWDER, METERED ORAL; RESPIRATORY (INHALATION) at 20:40

## 2022-10-10 RX ADMIN — BENZTROPINE MESYLATE 0.5 MG: 0.5 TABLET ORAL at 09:10

## 2022-10-10 RX ADMIN — BUSPIRONE HYDROCHLORIDE 30 MG: 15 TABLET ORAL at 09:10

## 2022-10-10 RX ADMIN — BENZTROPINE MESYLATE 0.5 MG: 0.5 TABLET ORAL at 20:39

## 2022-10-10 RX ADMIN — LEVOTHYROXINE SODIUM 88 MCG: 0.09 TABLET ORAL at 09:10

## 2022-10-10 RX ADMIN — GABAPENTIN 100 MG: 100 CAPSULE ORAL at 09:10

## 2022-10-10 ASSESSMENT — ACTIVITIES OF DAILY LIVING (ADL)
ADLS_ACUITY_SCORE: 77
ADLS_ACUITY_SCORE: 76
ADLS_ACUITY_SCORE: 77
ADLS_ACUITY_SCORE: 76
ADLS_ACUITY_SCORE: 77

## 2022-10-10 NOTE — PLAN OF CARE
Goal Outcome Evaluation:    Plan of Care Reviewed With: patient      Flat affect, mood is calm. Pt in room all shift. Ate 100% breakfast and 0% lunch. Pt taking fluids with encouragement. Pt took all scheduled medications. Pt appears responding, pt alert to self only. Pt denies questions/concerns at this time.      Plan:  Continue to encourage fluids and increased activity  Continue to encourage group participation  Continue to encourage choices and independence  Continue to encourage non-pharmacological coping skills

## 2022-10-10 NOTE — PLAN OF CARE
Goal Outcome Evaluation:         Patient slept well the whole night for a total of 10.5 hours. No behavioral issues observed. No complaints made.

## 2022-10-10 NOTE — PROGRESS NOTES
"PSYCHIATRY  PROGRESS NOTE     DATE OF SERVICE   10/10/2022       CHIEF COMPLAINT   Patient was admitted due to inability to safely care for himself and psychosis.       SUBJECTIVE   Nursing reports:  /74 (BP Location: Right arm, Patient Position: Supine, Cuff Size: Adult Small)   Pulse 92   Temp 97.6  F (36.4  C) (Oral)   Resp 16   Ht 1.778 m (5' 10\")   Wt 78.7 kg (173 lb 8 oz)   SpO2 100%   BMI 24.89 kg/m     Pt flat and blunted. Decline all psychiatric symptoms at this time. Kept saying \"NO\" to all questions asked,including pain. Pt requested for orange juice and was given, fluids was encouraged.He was isolative and withdrawn to the room all shift, and did not attend group therapy. Pt refused to come out for dinner despite several encouragement.Told writer he was not hungry. Writer brought dinner to his room, checked later, pt did not still eat. Juice and cake was left in his room. Pt compliant with medication. Fall precaution, no roommate, frequent toilet scheduled maintained. Continue to monitor patient per care plan.    Patient has been discussed in detail with the  during team meeting.    Assessment/Intervention/Current Symptoms and Care Coordination:   Per [facility], Negative COVID test (must be completed within 48 hours of admission)  -Reviewed pt s chart  -Rounded with team in review of pt's care and plan  -Spoke with pt's legal guardian who confirmed she has received pt's medication management paperwork and will sign and return it to writer, this morning:  [facility]  Jo Gurrola  Mental Health   [facility]  [address]  [place] [address]  [email or web address].  Main: [phone]  EXT: [phone]  FAX: [phone]     OBJECTIVE   Patient was seen and evaluated at bedside by himself, this was a face-to-face evaluation.  Patient reported that he is " "feeling tired but denies all other psychiatric symptoms. He has been actively hallucinating during my assessment, at the same time he was calm and cooperative. Denies any issues or concerns and is only preoccupied with food.        MEDICATIONS   Medications:  Scheduled Meds:    aspirin  81 mg Oral Daily     benztropine  0.5 mg Oral BID     busPIRone HCl  30 mg Oral BID     cloZAPine  250 mg Oral At Bedtime     donepezil  10 mg Oral At Bedtime     gabapentin  100 mg Oral TID w/meals     levothyroxine  88 mcg Oral Daily     megestrol  20 mg Oral Daily     melatonin  5 mg Oral At Bedtime     memantine  10 mg Oral BID     mirtazapine  15 mg Oral At Bedtime     multivitamin, therapeutic  1 tablet Oral Daily     salmeterol  1 puff Inhalation BID     thiamine  100 mg Oral Daily     Continuous Infusions:  PRN Meds:.albuterol, alum & mag hydroxide-simethicone, hydrOXYzine, nystatin, polyethylene glycol, senna-docusate    Medication adherence issues: MS Med Adherence Y/N: Yes, Hospitalization  Medication side effects: MEDICATION SIDE EFFECTS: no side effects reported  Benefit: Yes / No: Yes       ROS   A comprehensive review of systems was negative.       MENTAL STATUS EXAM   Vitals: /72   Pulse 86   Temp 97.5  F (36.4  C) (Temporal)   Resp 16   Ht 1.778 m (5' 10\")   Wt 78.7 kg (173 lb 8 oz)   SpO2 97%   BMI 24.89 kg/m       Appearance:  No apparent distress  Mood: \"I am tired\".  Affect: Calm  Suicidal Ideation: Denies  Homicidal Ideation: Denies  Thought process: Disorganized and concrete  Thought content: Remains confused and delusional and hallucinating at times.    Fund of Knowledge: Below average  Attention/Concentration: Poor  Language ability: Significantly impaired  Memory: Global memory impairment  Insight:  Poor.  Judgement: Poor  Orientation: Only to person  Psychomotor Behavior: slowed    Muscle Strength and Tone: MuscleStrength: Normal and Atrophy  Gait and Station: Not evaluated       LABS "   personally reviewed.     No results found for: PHENYTOIN, PHENOBARB, VALPROATE, CBMZ       DIAGNOSIS   Principal Problem:    Major neurocognitive disorder, due to multiple etiologies, with behavioral disturbance, severe    Active Problem List:  Patient Active Problem List   Diagnosis     TBI with aggressive behavior     HTN (hypertension)     COPD (chronic obstructive pulmonary disease) (H)     Dementia with behavioral disturbance     Chronic schizophrenia (H)     Smoker     Agitation     Behavior disturbance     Psychosis (H)     Major neurocognitive disorder, due to multiple etiologies, with behavioral disturbance, severe     Paranoid schizophrenia, chronic condition with acute exacerbation (H)     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 Ongoing education given regarding diagnostic and treatment options with risks, benefits and alternatives and adequate verbalization of understanding.  2.  Medications       BuSpar 30 mg 2 times daily       Cogentin 0.5 mg 2 times a day       Clozaril 250 mg at bedtime       Aricept 10 mg at bedtime       Gabapentin 100 mg 3 times a day       Namenda 10 mg 2 times a day       Remeron 15 mg at bedtime       melatonin 5 mg at bedtime  3.  Medical team following the patient   4.   coordinating a safe discharge plan with the patient.  5.  Labs have been ordered, liver functions remained stable consistent with the hep C diagnosis.      Risk Assessment: [facility] RISK ASSESSMENT: Patient able to contract for safety    Coordination of Care:   Treatment Plan reviewed and physician signed, Care discussed with Care/Treatment Team Members, Chart reviewed and Patient seen      Re-Certification I certify that the inpatient psychiatric facility services furnished since the previous certification were, and  continue to be, medically necessary for, either, treatment which could reasonably be expected to improve the patient s condition or diagnostic study and that the hospital records indicate that the services furnished were, either, intensive treatment services, admission and related services necessary for diagnostic study, or equivalent services.     I certify that the patient continues to need, on a daily basis, active treatment furnished directly by or requiring the supervision of inpatient psychiatric facility personnel.   I estimate 14 days of hospitalization is necessary for proper treatment of the patient. My plans for post-hospital care for this patient are  TBD     Ginger Dubon MD    -     10/10/2022  -     1:33 PM    Total time 15 minutes with > 50%spent on coordination of cares and psycho-education.    This note was created with help of Dragon dictation system. Grammatical / typing errors are not intentional.    Ginger Dubon MD

## 2022-10-10 NOTE — PLAN OF CARE
Assessment/Intervention/Current Symptoms and Care Coordination:   Per [facility], Negative COVID test (must be completed within 48 hours of admission)  Per [facility], discharge is likely to occur on Thursday.  -Reviewed pt s chart  -Rounded with team in review of pt's care and plan  -Spoke with pt's legal guardian who confirmed she has received pt's medication management paperwork and will sign and return it to writer, this morning:  [facility]  Jo Gurrola  Mental Health   [facility]  [address] [place] [address]  [email or web address].  Main: [phone]  EXT: [phone]  FAX: [phone]      Things needed for pt's admission:  Signed physician orders to admit - Please fax to [facility] [phone]  Current H&P faxed to [facility] at [phone]  Signed med list - Please fax to [facility] at [phone] and to [facility] at [phone]  Orders for PT  Medications for 2-3 days (sent with John)  DME: Wheelchair and hospital bed (if needed).  Negative COVID test (must be completed within 48 hours of admission)         [facility] Contact:  Jo Mcgraw    [facility]  Assisted Living & [facility] Care  [facility]  Cell: [phone]  Fax: [phone]  Email:  [email or web address]  Web: [email or web address]           Discharge Plan or Goal:   Has been accepted into [facility]. Awaiting [place] Choice assessment on 10/5/2022      Barriers to Discharge:  - Funding  -[place] Choice to be expeditiously done     Referral Status:  - [facility]  - [facility]     Legal Status:  Court Hold    LEGAL GUARDIAN:  Hallie Johnson  Guardian/Conservator  [employer] [facility]  [address], [place] [address]  Office:  [phone] / Fax: [phone]     Contact:  Joi Roque  [facility]   [facility]  Phone: [phone]    Fax: [phone]

## 2022-10-11 PROCEDURE — 250N000013 HC RX MED GY IP 250 OP 250 PS 637: Performed by: PSYCHIATRY & NEUROLOGY

## 2022-10-11 PROCEDURE — 99231 SBSQ HOSP IP/OBS SF/LOW 25: CPT | Performed by: PSYCHIATRY & NEUROLOGY

## 2022-10-11 PROCEDURE — 250N000013 HC RX MED GY IP 250 OP 250 PS 637: Performed by: PHYSICIAN ASSISTANT

## 2022-10-11 PROCEDURE — 124N000003 HC R&B MH SENIOR/ADOLESCENT

## 2022-10-11 RX ADMIN — SALMETEROL XINAFOATE 1 PUFF: 50 POWDER, METERED ORAL; RESPIRATORY (INHALATION) at 20:50

## 2022-10-11 RX ADMIN — MEGESTROL ACETATE 20 MG: 20 TABLET ORAL at 07:59

## 2022-10-11 RX ADMIN — BUSPIRONE HYDROCHLORIDE 30 MG: 15 TABLET ORAL at 07:58

## 2022-10-11 RX ADMIN — GABAPENTIN 100 MG: 100 CAPSULE ORAL at 12:53

## 2022-10-11 RX ADMIN — GABAPENTIN 100 MG: 100 CAPSULE ORAL at 07:59

## 2022-10-11 RX ADMIN — LEVOTHYROXINE SODIUM 88 MCG: 0.09 TABLET ORAL at 07:59

## 2022-10-11 RX ADMIN — GABAPENTIN 100 MG: 100 CAPSULE ORAL at 17:21

## 2022-10-11 RX ADMIN — CLOZAPINE 250 MG: 100 TABLET ORAL at 20:49

## 2022-10-11 RX ADMIN — Medication 5 MG: at 20:49

## 2022-10-11 RX ADMIN — BENZTROPINE MESYLATE 0.5 MG: 0.5 TABLET ORAL at 20:50

## 2022-10-11 RX ADMIN — BENZTROPINE MESYLATE 0.5 MG: 0.5 TABLET ORAL at 07:59

## 2022-10-11 RX ADMIN — BUSPIRONE HYDROCHLORIDE 30 MG: 15 TABLET ORAL at 20:50

## 2022-10-11 RX ADMIN — SALMETEROL XINAFOATE 1 PUFF: 50 POWDER, METERED ORAL; RESPIRATORY (INHALATION) at 07:59

## 2022-10-11 RX ADMIN — MIRTAZAPINE 15 MG: 15 TABLET, FILM COATED ORAL at 20:50

## 2022-10-11 RX ADMIN — DONEPEZIL HYDROCHLORIDE 10 MG: 10 TABLET ORAL at 20:50

## 2022-10-11 RX ADMIN — THIAMINE HCL TAB 100 MG 100 MG: 100 TAB at 07:59

## 2022-10-11 RX ADMIN — MEMANTINE 10 MG: 10 TABLET ORAL at 07:59

## 2022-10-11 RX ADMIN — THERA TABS 1 TABLET: TAB at 07:58

## 2022-10-11 RX ADMIN — ASPIRIN 81 MG: 81 TABLET, COATED ORAL at 07:59

## 2022-10-11 RX ADMIN — MEMANTINE 10 MG: 10 TABLET ORAL at 20:50

## 2022-10-11 ASSESSMENT — ACTIVITIES OF DAILY LIVING (ADL)
ADLS_ACUITY_SCORE: 76

## 2022-10-11 NOTE — PLAN OF CARE
"  Problem: Behavior Regulation Impairment (Psychotic Signs/Symptoms)  Goal: Improved Behavioral Control (Psychotic Signs/Symptoms)  Outcome: Progressing   Goal Outcome Evaluation:    Plan of Care Reviewed With: patient      Patient is  calm, medication compliant. Denies MH concerns, stating \"I feel fine\". Patient isolative and withdrawn, came out of room for meals otherwise does not participate in milieu activities. Patient ate 50% of breakfast and 75% of lunch. Tentative discharge 10/13/22, medications are in med room except for clozapine, CTC and MD are aware and will be resolving the issue prior to discharge.    Patient refused shower or bed bath, patient is malodorous, will continue to encourage patient to perform ADLs.    Patient has white castle milk shake in patient freezer  "

## 2022-10-11 NOTE — PROGRESS NOTES
"PSYCHIATRY  PROGRESS NOTE     DATE OF SERVICE   10/11/2022       CHIEF COMPLAINT   Patient was admitted due to inability to safely care for himself and psychosis.       SUBJECTIVE   Nursing reports:  Goal Outcome Evaluation:     Plan of Care Reviewed With: patient       Patient is  calm, medication compliant. Denies MH concerns, stating \"I feel fine\". Patient isolative and withdrawn, came out of room for meals otherwise does not participate in milieu activities. Patient ate 50% of breakfast and 75% of lunch. Tentative discharge 10/13/22, medications are in med room except for clozapine, CTC and MD are aware and will be resolving the issue prior to discharge.     Patient refused shower or bed bath, patient is malodorous, will continue to encourage patient to perform ADLs.     Patient has white castle milk shake in patient freezer    Patient has been discussed in detail with the  during team meeting.   informed me that the patient will be able to leave the hospital on Thursday morning.     OBJECTIVE   Patient was seen and evaluated at bedside by himself, this was a face-to-face evaluation.  Patient reported that he is doing okay and denied all psychiatric symptoms at the moment of the assessment.  Continues to hallucinate and is delusional.  He is denying any other issues or concerns.  Patient has been made aware that he is leaving the hospital on Thursday but I am not sure how much the patient was able to understand.       MEDICATIONS   Medications:  Scheduled Meds:    aspirin  81 mg Oral Daily     benztropine  0.5 mg Oral BID     busPIRone HCl  30 mg Oral BID     cloZAPine  250 mg Oral At Bedtime     donepezil  10 mg Oral At Bedtime     gabapentin  100 mg Oral TID w/meals     levothyroxine  88 mcg Oral Daily     megestrol  20 mg Oral Daily     melatonin  5 mg Oral At Bedtime     memantine  10 mg Oral BID     mirtazapine  15 mg Oral At Bedtime     multivitamin, therapeutic  1 tablet Oral " "Daily     salmeterol  1 puff Inhalation BID     thiamine  100 mg Oral Daily     Continuous Infusions:  PRN Meds:.albuterol, alum & mag hydroxide-simethicone, hydrOXYzine, nystatin, polyethylene glycol, senna-docusate    Medication adherence issues: MS Med Adherence Y/N: Yes, Hospitalization  Medication side effects: MEDICATION SIDE EFFECTS: no side effects reported  Benefit: Yes / No: Yes       ROS   A comprehensive review of systems was negative.       MENTAL STATUS EXAM   Vitals: /67 (BP Location: Right arm, Patient Position: Supine, Cuff Size: Adult Small)   Pulse 94   Temp 98.4  F (36.9  C) (Oral)   Resp 16   Ht 1.778 m (5' 10\")   Wt 78.4 kg (172 lb 13.5 oz)   SpO2 100%   BMI 24.80 kg/m       Same as last visit  Appearance:  No apparent distress  Mood: \"I am tired\".  Affect: Calm  Suicidal Ideation: Denies  Homicidal Ideation: Denies  Thought process: Disorganized and concrete  Thought content: Remains confused and delusional and hallucinating at times.    Fund of Knowledge: Below average  Attention/Concentration: Poor  Language ability: Significantly impaired  Memory: Global memory impairment  Insight:  Poor.  Judgement: Poor  Orientation: Only to person  Psychomotor Behavior: slowed    Muscle Strength and Tone: MuscleStrength: Normal and Atrophy  Gait and Station: Not evaluated       LABS   personally reviewed.     No results found for: PHENYTOIN, PHENOBARB, VALPROATE, CBMZ       DIAGNOSIS   Principal Problem:    Major neurocognitive disorder, due to multiple etiologies, with behavioral disturbance, severe    Active Problem List:  Patient Active Problem List   Diagnosis     TBI with aggressive behavior     HTN (hypertension)     COPD (chronic obstructive pulmonary disease) (H)     Dementia with behavioral disturbance     Chronic schizophrenia (H)     Smoker     Agitation     Behavior disturbance     Psychosis (H)     Major neurocognitive disorder, due to multiple etiologies, with behavioral " disturbance, severe     Paranoid schizophrenia, chronic condition with acute exacerbation (H)     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 Ongoing education given regarding diagnostic and treatment options with risks, benefits and alternatives and adequate verbalization of understanding.  2.  Medications       BuSpar 30 mg 2 times daily       Cogentin 0.5 mg 2 times a day       Clozaril 250 mg at bedtime       Aricept 10 mg at bedtime       Gabapentin 100 mg 3 times a day       Namenda 10 mg 2 times a day       Remeron 15 mg at bedtime       melatonin 5 mg at bedtime  3.  Medical team following the patient   4.   coordinating a safe discharge plan with the patient.  5.  Labs have been ordered, liver functions remained stable consistent with the hep C diagnosis.      Risk Assessment: [facility] RISK ASSESSMENT: Patient able to contract for safety    Coordination of Care:   Treatment Plan reviewed and physician signed, Care discussed with Care/Treatment Team Members, Chart reviewed and Patient seen      Re-Certification I certify that the inpatient psychiatric facility services furnished since the previous certification were, and continue to be, medically necessary for, either, treatment which could reasonably be expected to improve the patient s condition or diagnostic study and that the hospital records indicate that the services furnished were, either, intensive treatment services, admission and related services necessary for diagnostic study, or equivalent services.     I certify that the patient continues to need, on a daily basis, active treatment furnished directly by or requiring the supervision of inpatient psychiatric facility personnel.   I estimate 14 days of hospitalization is necessary for proper treatment of the patient.  My plans for post-hospital care for this patient are  TBD     Ginger Dubon MD    -     10/11/2022  -     6:47 PM    Total time 15 minutes with > 50%spent on coordination of cares and psycho-education.    This note was created with help of Dragon dictation system. Grammatical / typing errors are not intentional.    Ginger Dubon MD

## 2022-10-11 NOTE — PLAN OF CARE
Goal Outcome Evaluation:       Patient slept well the whole night for 9.75 hours without interruptions. No behavioral issues observed. Nothing unusual noted.

## 2022-10-11 NOTE — PLAN OF CARE
Pt needs a negative COVID-19 result (no later than 48 hours to admission) in order to be admitted, per [facility].      CTC: Writer called and scheduled medical transportation for this pt. The  reported that he is sending out information to his /operations to see what time they can transport the patient on Thursday. Needs more information from RN regarding whether the patient needs monitoring while riding.  Facility to receive the pt:  [facility], [address] [place] [address].   Phone: [phone]  Appointment Date/Time: Not later than 1pm.  Medical transportation staff to call with update.    CTC: Medical transportation staff/billing - (Nicolasa at [phone]) sent a transportation cost information for signature which writer sent to pt's guardian. The guardian sent a message to writer asking who will pay for the transportation. Writer referred the guardian to the billing staff (provided name and phone number and requested she calls) for proper clarification as writer does not have the information that was asked. Awaiting for the guardian to sign and return the paperwork for transportation to be finalized.    - Coordinated with Jo at [facility]. The Director of the facility - Mayda stated that blood draws are done by their nurse (in-house) that is for the cloazirl.  Awaiting for Mayda to provide additional information.

## 2022-10-11 NOTE — PLAN OF CARE
Problem: Behavioral Health Plan of Care  Goal: Plan of Care Review  Outcome: Adequate for Care Transition  Flowsheets (Taken 10/10/2022 2000)  Patient Agreement with Plan of Care: agrees     Problem: Cognitive Impairment (Psychotic Signs/Symptoms)  Goal: Optimal Cognitive Function (Psychotic Signs/Symptoms)  Intervention: Support and Promote Cognitive Ability  Recent Flowsheet Documentation  Taken 10/10/2022 2000 by Toy Leon RN  Trust Relationship/Rapport:   care explained   thoughts/feelings acknowledged   reassurance provided   emotional support provided   choices provided   questions encouraged     Problem: Mood Impairment (Psychotic Signs/Symptoms)  Goal: Improved Mood Symptoms (Psychotic Signs/Symptoms)  Intervention: Optimize Emotion and Mood  Recent Flowsheet Documentation  Taken 10/10/2022 2000 by Toy Leon RN  Supportive Measures:   active listening utilized   decision-making supported   self-care encouraged   self-responsibility promoted   verbalization of feelings encouraged   goal-setting facilitated   positive reinforcement provided     Problem: Suicide Risk  Goal: Absence of Self-Harm  Outcome: Adequate for Care Transition  Intervention: Assess Risk to Self and Maintain Safety  Recent Flowsheet Documentation  Taken 10/10/2022 2000 by Toy Leon RN  Behavior Management: impulse control promoted  Intervention: Promote Psychosocial Wellbeing  Recent Flowsheet Documentation  Taken 10/10/2022 2000 by Toy Leon RN  Supportive Measures:   active listening utilized   decision-making supported   self-care encouraged   self-responsibility promoted   verbalization of feelings encouraged   goal-setting facilitated   positive reinforcement provided  Sleep/Rest Enhancement:   consistent schedule promoted   medication   regular sleep/rest pattern promoted   Goal Outcome Evaluation:    Plan of Care Reviewed With: patient        Pt is isolative in room all shift. He denies pain, anxiety, depression,  SI/SIB/HI/AH/VH, and contracts for safety. Pt is pleasant with a flat and blunted affect. He appears not to have an insight into his mental health situation. Judgement and insight is not appropriate to situation. He is disoriented to time and situation. Plan is to discharge pt to a memory care unit on Wednesday. No other concerns noted or verbalized. Will continue with same plan of care.

## 2022-10-12 LAB — SARS-COV-2 RNA RESP QL NAA+PROBE: NEGATIVE

## 2022-10-12 PROCEDURE — 250N000013 HC RX MED GY IP 250 OP 250 PS 637: Performed by: PSYCHIATRY & NEUROLOGY

## 2022-10-12 PROCEDURE — 124N000003 HC R&B MH SENIOR/ADOLESCENT

## 2022-10-12 PROCEDURE — 99231 SBSQ HOSP IP/OBS SF/LOW 25: CPT | Performed by: PSYCHIATRY & NEUROLOGY

## 2022-10-12 PROCEDURE — 250N000013 HC RX MED GY IP 250 OP 250 PS 637: Performed by: PHYSICIAN ASSISTANT

## 2022-10-12 PROCEDURE — U0005 INFEC AGEN DETEC AMPLI PROBE: HCPCS | Performed by: PSYCHIATRY & NEUROLOGY

## 2022-10-12 RX ORDER — CLOZAPINE 100 MG/1
250 TABLET ORAL AT BEDTIME
Qty: 75 TABLET | Refills: 0 | Status: SHIPPED | OUTPATIENT
Start: 2022-10-12

## 2022-10-12 RX ADMIN — Medication 5 MG: at 20:22

## 2022-10-12 RX ADMIN — GABAPENTIN 100 MG: 100 CAPSULE ORAL at 17:57

## 2022-10-12 RX ADMIN — SALMETEROL XINAFOATE 1 PUFF: 50 POWDER, METERED ORAL; RESPIRATORY (INHALATION) at 20:22

## 2022-10-12 RX ADMIN — GABAPENTIN 100 MG: 100 CAPSULE ORAL at 08:41

## 2022-10-12 RX ADMIN — MEMANTINE 10 MG: 10 TABLET ORAL at 08:41

## 2022-10-12 RX ADMIN — LEVOTHYROXINE SODIUM 88 MCG: 0.09 TABLET ORAL at 08:40

## 2022-10-12 RX ADMIN — ASPIRIN 81 MG: 81 TABLET, COATED ORAL at 08:41

## 2022-10-12 RX ADMIN — THERA TABS 1 TABLET: TAB at 08:41

## 2022-10-12 RX ADMIN — MEMANTINE 10 MG: 10 TABLET ORAL at 20:22

## 2022-10-12 RX ADMIN — DONEPEZIL HYDROCHLORIDE 10 MG: 10 TABLET ORAL at 20:22

## 2022-10-12 RX ADMIN — CLOZAPINE 250 MG: 100 TABLET ORAL at 20:22

## 2022-10-12 RX ADMIN — BENZTROPINE MESYLATE 0.5 MG: 0.5 TABLET ORAL at 20:22

## 2022-10-12 RX ADMIN — BUSPIRONE HYDROCHLORIDE 30 MG: 15 TABLET ORAL at 20:22

## 2022-10-12 RX ADMIN — SALMETEROL XINAFOATE 1 PUFF: 50 POWDER, METERED ORAL; RESPIRATORY (INHALATION) at 08:37

## 2022-10-12 RX ADMIN — THIAMINE HCL TAB 100 MG 100 MG: 100 TAB at 08:41

## 2022-10-12 RX ADMIN — GABAPENTIN 100 MG: 100 CAPSULE ORAL at 12:31

## 2022-10-12 RX ADMIN — MIRTAZAPINE 15 MG: 15 TABLET, FILM COATED ORAL at 20:22

## 2022-10-12 RX ADMIN — BENZTROPINE MESYLATE 0.5 MG: 0.5 TABLET ORAL at 08:41

## 2022-10-12 RX ADMIN — BUSPIRONE HYDROCHLORIDE 30 MG: 15 TABLET ORAL at 08:41

## 2022-10-12 RX ADMIN — MEGESTROL ACETATE 20 MG: 20 TABLET ORAL at 08:41

## 2022-10-12 ASSESSMENT — ACTIVITIES OF DAILY LIVING (ADL)
ADLS_ACUITY_SCORE: 80
ADLS_ACUITY_SCORE: 76
ADLS_ACUITY_SCORE: 80
ADLS_ACUITY_SCORE: 76
ADLS_ACUITY_SCORE: 76
ADLS_ACUITY_SCORE: 80
ADLS_ACUITY_SCORE: 80
ADLS_ACUITY_SCORE: 76
ADLS_ACUITY_SCORE: 80
ADLS_ACUITY_SCORE: 80
ADLS_ACUITY_SCORE: 76
ADLS_ACUITY_SCORE: 76

## 2022-10-12 NOTE — PLAN OF CARE
Spoke with [facility] medical transport staff. Pt is scheduled to be transported tomorrow at 10:45am.      CTC: Per Jo at the [facility], the pt will need to:  Things needed for pt's admission:  1. Come with his own wheel chair.  2. Signed physician orders to admit - Please fax to Melissa Ulloa [phone]  3. Current H&P faxed to [facility] at [phone]  4. Signed med list - Please fax to [facility] at [phone] and to [facility] at [phone]  5.Orders for PT  6. Medications for 2-3 days (sent with John)  7. DME: Wheelchair and hospital bed (if needed).  8.Negative COVID test (must be completed within 48 hours of admission)

## 2022-10-12 NOTE — PLAN OF CARE
Goal Outcome Evaluation:       Pateint slept soundly for 9 hours the whole night. No complaints made. No behavioral issues observed.

## 2022-10-12 NOTE — PLAN OF CARE
Problem: Behavior Regulation Impairment (Psychotic Signs/Symptoms)  Goal: Improved Behavioral Control (Psychotic Signs/Symptoms)  Outcome: Progressing     Problem: Decreased Participation and Engagement (Psychotic Signs/Symptoms)  Goal: Increased Participation and Engagement (Psychotic Signs/Symptoms)  Intervention: Facilitate Participation and Engagement  Recent Flowsheet Documentation  Taken 10/11/2022 1839 by Cynthia Patrick RN  Supportive Measures:    active listening utilized    self-care encouraged    self-reflection promoted    verbalization of feelings encouraged   Goal Outcome Evaluation:    Plan of Care Reviewed With: patient    Pt alert,flat and blunted. Isolative and withdrawn to room this shift. Eyes contact darting.Denied any Psychosis sign and symptoms. Did not attend group therapy, nor socialized with peers. Encouraged severally to come out for dinner, but refused. Dinner was taken to his room, had 50% with some juice.Pt refused to be taken to the bathroom, but was reminded. Pt was medications compliant. No room mate, small cuff for BP. Fall precaution maintained. Discharge in progress, possible on Thursday. Continue with plan of care.

## 2022-10-12 NOTE — PLAN OF CARE
Problem: Behavior Regulation Impairment (Psychotic Signs/Symptoms)  Goal: Improved Behavioral Control (Psychotic Signs/Symptoms)  Outcome: Not Progressing     Problem: Cognitive Impairment (Psychotic Signs/Symptoms)  Goal: Optimal Cognitive Function (Psychotic Signs/Symptoms)  Outcome: Not Progressing     Problem: Mood Impairment (Psychotic Signs/Symptoms)  Goal: Improved Mood Symptoms (Psychotic Signs/Symptoms)  Outcome: Not Progressing   Goal Outcome Evaluation:    Patient is calm and cooperative, medication compliant. Visible for meals, declined shower but allowed staff to give a partial bed bath. Tentative discharge 10/13. Estimated discharge time 1045    Covid test negative  Medications are in the med room.  Patient envelope of belongings collected from security and is in the med room with discharge medications

## 2022-10-13 VITALS
HEART RATE: 105 BPM | DIASTOLIC BLOOD PRESSURE: 81 MMHG | OXYGEN SATURATION: 99 % | BODY MASS INDEX: 24.74 KG/M2 | WEIGHT: 172.84 LBS | RESPIRATION RATE: 18 BRPM | SYSTOLIC BLOOD PRESSURE: 117 MMHG | TEMPERATURE: 97.5 F | HEIGHT: 70 IN

## 2022-10-13 PROCEDURE — 250N000013 HC RX MED GY IP 250 OP 250 PS 637: Performed by: PHYSICIAN ASSISTANT

## 2022-10-13 PROCEDURE — 250N000013 HC RX MED GY IP 250 OP 250 PS 637: Performed by: PSYCHIATRY & NEUROLOGY

## 2022-10-13 PROCEDURE — 99239 HOSP IP/OBS DSCHRG MGMT >30: CPT | Performed by: PSYCHIATRY & NEUROLOGY

## 2022-10-13 RX ADMIN — BUSPIRONE HYDROCHLORIDE 30 MG: 15 TABLET ORAL at 08:22

## 2022-10-13 RX ADMIN — THERA TABS 1 TABLET: TAB at 08:22

## 2022-10-13 RX ADMIN — SALMETEROL XINAFOATE 1 PUFF: 50 POWDER, METERED ORAL; RESPIRATORY (INHALATION) at 08:26

## 2022-10-13 RX ADMIN — BENZTROPINE MESYLATE 0.5 MG: 0.5 TABLET ORAL at 08:23

## 2022-10-13 RX ADMIN — LEVOTHYROXINE SODIUM 88 MCG: 0.09 TABLET ORAL at 08:23

## 2022-10-13 RX ADMIN — ASPIRIN 81 MG: 81 TABLET, COATED ORAL at 08:23

## 2022-10-13 RX ADMIN — MEMANTINE 10 MG: 10 TABLET ORAL at 08:22

## 2022-10-13 RX ADMIN — THIAMINE HCL TAB 100 MG 100 MG: 100 TAB at 08:22

## 2022-10-13 RX ADMIN — MEGESTROL ACETATE 20 MG: 20 TABLET ORAL at 08:22

## 2022-10-13 RX ADMIN — GABAPENTIN 100 MG: 100 CAPSULE ORAL at 08:23

## 2022-10-13 ASSESSMENT — ACTIVITIES OF DAILY LIVING (ADL)
ADLS_ACUITY_SCORE: 80

## 2022-10-13 NOTE — PLAN OF CARE
10/13/22 0910   Individualization/Patient Specific Goals   Patient Personal Strengths Resilient; Improvements in cooperating with directions    Patient Vulnerabilities lacks insight into illness   Anxieties, Fears or Concerns Patient lacks insight into his illness. He is at baseline with delusions and hallucinations.   Individualized Care Needs Patient is on a commitment/pittman and guardianship has been determined. Continue to encourage food, hygiene, socializing/cominig out to the lounge, taking walks on the unit. Discharging today to a care facility for continuation of care   Patient-Specific Goals (Include Timeframe) Continue with discharge plan - to assisted living/memory care for continuation of care   Interprofessional Rounds   Summary Patient continues to be stable at baseline. Patient has delusions and hallucinations. Patient is waiting for placement. He has been accepted into [facility]. Pt is discharging to the care facility, today   Participants nursing;psychiatrist;CTC;OT   Team Discussion   Participants Dr. Dk MD; FIFI Rivera, Juarez Muse RN; Radha Licona OTR; LASHAY Licona.   Progress Making progress.   Anticipated length of stay Discharging today   Continued Stay Criteria/Rationale Discharging today   Medical/Physical Please see H and P.   Plan Pt received: medication management, psychiatric evaluations, medical evaluations as needed, nursing assessments, milieu therapy, and CTC case management. Order for guardianship were received. Letters of guardianship were received. Guardian was established. Pt is discharging to [facility] today   Anticipated Discharge Disposition Today 10/13/2022      Pt is discharging via [facility] ambulance at 10:45am  Receiving facility and staff have coordinated the discharge and receiving facility has reported they are waiting for the patient's arrival for continuation of care.

## 2022-10-13 NOTE — PLAN OF CARE
Goal Outcome Evaluation:       Patient slept well the whole night without interruptions for 9 hours. No complaints made. Nothing unusual noted.

## 2022-10-13 NOTE — PROGRESS NOTES
"PSYCHIATRY  PROGRESS NOTE     DATE OF SERVICE   10/12/2022       CHIEF COMPLAINT   Patient was admitted due to inability to safely care for himself and psychosis.       SUBJECTIVE   Nursing reports:  Patient is calm and cooperative, medication compliant. Visible for meals, declined shower but allowed staff to give a partial bed bath. Tentative discharge 10/13. Estimated discharge time 1045     Covid test negative  Medications are in the med room.  Patient envelope of belongings collected from security and is in the med room with discharge medications    Patient has been discussed in detail with the  during team meeting.  We continue to plan for the patient to leave tomorrow.     OBJECTIVE   Patient was seen and evaluated at bedside by himself, this was a face-to-face evaluation.  I have informed the patient that we are looking for him to leave tomorrow early in the morning.  Patient responded \"I want my lunch and I want [place] fried chicken\".  Patient then asked if he was going home and I discussed with the patient that he is going to a facility.  Patient agreed but it is difficult to understand if he is able to process this information.       MEDICATIONS   Medications:  Scheduled Meds:    aspirin  81 mg Oral Daily     benztropine  0.5 mg Oral BID     busPIRone HCl  30 mg Oral BID     cloZAPine  250 mg Oral At Bedtime     donepezil  10 mg Oral At Bedtime     gabapentin  100 mg Oral TID w/meals     levothyroxine  88 mcg Oral Daily     megestrol  20 mg Oral Daily     melatonin  5 mg Oral At Bedtime     memantine  10 mg Oral BID     mirtazapine  15 mg Oral At Bedtime     multivitamin, therapeutic  1 tablet Oral Daily     salmeterol  1 puff Inhalation BID     thiamine  100 mg Oral Daily     Continuous Infusions:  PRN Meds:.albuterol, alum & mag hydroxide-simethicone, hydrOXYzine, nystatin, polyethylene glycol, senna-docusate    Medication adherence issues: MS Med Adherence Y/N: Yes, " "Hospitalization  Medication side effects: MEDICATION SIDE EFFECTS: no side effects reported  Benefit: Yes / No: Yes       ROS   A comprehensive review of systems was negative.       MENTAL STATUS EXAM   Vitals: /80 (BP Location: Left arm, Patient Position: Supine, Cuff Size: Adult Small)   Pulse 90   Temp 98  F (36.7  C) (Temporal)   Resp 16   Ht 1.778 m (5' 10\")   Wt 78.4 kg (172 lb 13.5 oz)   SpO2 96%   BMI 24.80 kg/m       Same as last visit  Appearance:  No apparent distress  Mood: \"I am tired\".  Affect: Calm  Suicidal Ideation: Denies  Homicidal Ideation: Denies  Thought process: Disorganized and concrete  Thought content: Remains confused and delusional and hallucinating at times.    Fund of Knowledge: Below average  Attention/Concentration: Poor  Language ability: Significantly impaired  Memory: Global memory impairment  Insight:  Poor.  Judgement: Poor  Orientation: Only to person  Psychomotor Behavior: slowed    Muscle Strength and Tone: MuscleStrength: Normal and Atrophy  Gait and Station: Not evaluated       LABS   personally reviewed.     No results found for: PHENYTOIN, PHENOBARB, VALPROATE, CBMZ       DIAGNOSIS   Principal Problem:    Major neurocognitive disorder, due to multiple etiologies, with behavioral disturbance, severe    Active Problem List:  Patient Active Problem List   Diagnosis     TBI with aggressive behavior     HTN (hypertension)     COPD (chronic obstructive pulmonary disease) (H)     Dementia with behavioral disturbance     Chronic schizophrenia (H)     Smoker     Agitation     Behavior disturbance     Psychosis (H)     Major neurocognitive disorder, due to multiple etiologies, with behavioral disturbance, severe     Paranoid schizophrenia, chronic condition with acute exacerbation (H)     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 Ongoing education given regarding diagnostic and treatment options with risks, benefits and alternatives and adequate verbalization of understanding.  2.  Medications       BuSpar 30 mg 2 times daily       Cogentin 0.5 mg 2 times a day       Clozaril 250 mg at bedtime       Aricept 10 mg at bedtime       Gabapentin 100 mg 3 times a day       Namenda 10 mg 2 times a day       Remeron 15 mg at bedtime       melatonin 5 mg at bedtime  3.  Medical team following the patient   4.   coordinating a safe discharge plan with the patient.  5.  Labs have been ordered, liver functions remained stable consistent with the hep C diagnosis.      Risk Assessment: [facility] RISK ASSESSMENT: Patient able to contract for safety    Coordination of Care:   Treatment Plan reviewed and physician signed, Care discussed with Care/Treatment Team Members, Chart reviewed and Patient seen      Re-Certification I certify that the inpatient psychiatric facility services furnished since the previous certification were, and continue to be, medically necessary for, either, treatment which could reasonably be expected to improve the patient s condition or diagnostic study and that the hospital records indicate that the services furnished were, either, intensive treatment services, admission and related services necessary for diagnostic study, or equivalent services.     I certify that the patient continues to need, on a daily basis, active treatment furnished directly by or requiring the supervision of inpatient psychiatric facility personnel.   I estimate 14 days of hospitalization is necessary for proper treatment of the patient. My plans for post-hospital care for this patient are  TBD     Ginger Dubon MD    -     10/12/2022  -     7:32 PM    Total time 15 minutes with > 50%spent on coordination of cares and psycho-education.    This note was created with help of Dragon dictation system.  Grammatical / typing errors are not intentional.    Ginger Dubon MD

## 2022-10-13 NOTE — DISCHARGE SUMMARY
PSYCHIATRY  DISCHARGE SUMMARY     DATE OF DISCHARGE   10/13/2022           DISCHARGE DIAGNOSIS   Major neurocognitive disorder, due to multiple etiologies, with behavioral disturbance, severe    Patient Active Problem List   Diagnosis     TBI with aggressive behavior     HTN (hypertension)     COPD (chronic obstructive pulmonary disease) (H)     Dementia with behavioral disturbance     Chronic schizophrenia (H)     Smoker     Agitation     Behavior disturbance     Psychosis (H)     Major neurocognitive disorder, due to multiple etiologies, with behavioral disturbance, severe     Paranoid schizophrenia, chronic condition with acute exacerbation (H)     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    This is a 56 year old male with history of Chronic schizophrenia (H).  Current legal status is commitment with a Yanez.  Patient is a 56-year-old  man, he is single with no children, currently homeless, supported by Social Security disability under a full commitment with a Yanez; that was admitted to the psychiatric inpatient unit due to disorganized behavior, psychosis and paranoia.     Today patient was seen and evaluated at bedside with  present during the assessment, this was done with the use of telehealth.  During the assessment the patient presented as extremely confused, concrete and unable to provide any meaningful information for this note.  Overall the patient was calm but he had problems tracking information.     According to the patient he is doing alright and has no problems with the medications.  Patient said that he is doing okay with the nurses.  Patient denies  having suicidal ideations.  Patient did reported that he is still hearing voices but when I ask about what the voices are telling him he didn't answer the question and kept staring at these writer.  Patient presented with slurred speech at times making it very difficult for us to be able to understand what he was saying.     According to Care Everywhere:   Patient was admitted in January 2020 at [facility] psychiatric inpatient unit.  During that admission the patient was taken off Zyprexa as this medication was not effective and he was started on Haldol.  Later on he was admitted on September of the same year at [facility] psychiatric inpatient unit and subsequently transferred to [facility] a year later.  Patient has been on Zyprexa, Haldol, Risperdal that were not effective.  Eventually the decision was made to amend the Yanez to either Clozaril that seemed to be working better for the patient.  Patient has been diagnosed with TBI, alcohol use disorder, stroke, subarachnoid hemorrhage, dementia and schizophrenia     The hospitalization has been prolonged since September 2020 as the patient has decompensated and has had significant cognitive impairment.  Patient is unable to take care of himself in the community and the plan will be for the patient to have a guardian moving forward.       HOSPITAL COURSE   Admitted due to aforementioned presentation.  Education regarding diagnostic and treatment options with risks, benefits and alternatives and adequate verbalization of understanding.  Discussed reviewed in further detail, stressors and events leading to presentation.    Admitted on a commitment with a Yanez order    Patient was started on a multimodal therapy that included medications.  Minimal medication changes have happened during this admission.  Clozaril was slightly increased to 250 mg at bedtime.  We discontinued the Haldol as we thought the patient was having some EPS symptoms.   "We continued with the Namenda, Aricept, Remeron, gabapentin and melatonin at the same doses.  Patient continues to present as very confused, withdrawn and for the most part.  He has been cooperative and redirectable.  Patient continues to be confused and having poor understanding of his medical and psychiatric problems.  He has continued to insist that he owns 2 mansions.  Patient is only oriented to person.    During this admission we were able to get guardianship for the patient.  At this time patient is being transferred to an assisted living facility memory care.  Patient is denying having any thoughts of harming himself or harming others and has been able to contract for safety.       MENTAL STATUS EXAM   Vitals: /81   Pulse 105   Temp 97.5  F (36.4  C) (Oral)   Resp 18   Ht 1.778 m (5' 10\")   Wt 78.4 kg (172 lb 13.5 oz)   SpO2 99%   BMI 24.80 kg/m      Mental Status:  Appearance:  No apparent distress  Mood: \"I am  tired\"  Affect: Calm and pleasant  Suicidal Ideation: Denied  Homicidal Ideation: Denied  Thought process: Disorganized  Thought content: Delusions and hallucinations at his baseline  Fund of Knowledge: Below average  Attention/Concentration: Easily distracted  Language ability: Significantly impaired  Memory: Global memory impairment  Insight:  Poor.  Judgement: Poor  Orientation: Only to person  Psychomotor Behavior: slowed    Muscle Strength and Tone: MuscleStrength: Normal and Atrophy  Gait and Station: Not evaluated       DISCHARGE MEDICATIONS   Discharge Medication Options:   No current facility-administered medications for this encounter.    Current Outpatient Medications:      albuterol (PROAIR HFA/PROVENTIL HFA/VENTOLIN HFA) 108 (90 Base) MCG/ACT inhaler, Inhale 1-2 puffs into the lungs every 4 hours as needed for shortness of breath / dyspnea or wheezing, Disp: 18 g, Rfl: 0     aspirin (ASA) 81 MG EC tablet, Take 1 tablet (81 mg) by mouth daily, Disp: 30 tablet, Rfl: 0     " benztropine (COGENTIN) 0.5 MG tablet, Take 1 tablet (0.5 mg) by mouth 2 times daily, Disp: 60 tablet, Rfl: 0     busPIRone HCl (BUSPAR) 30 MG tablet, Take 1 tablet (30 mg) by mouth 2 times daily, Disp: 60 tablet, Rfl: 0     cloZAPine (CLOZARIL) 100 MG tablet, Take 2.5 tablets (250 mg) by mouth At Bedtime, Disp: 75 tablet, Rfl: 0     donepezil (ARICEPT) 10 MG tablet, Take 1 tablet (10 mg) by mouth At Bedtime, Disp: 30 tablet, Rfl: 0     gabapentin (NEURONTIN) 100 MG capsule, Take 1 capsule (100 mg) by mouth 3 times daily (with meals), Disp: 90 capsule, Rfl: 0     levothyroxine (SYNTHROID/LEVOTHROID) 88 MCG tablet, Take 1 tablet (88 mcg) by mouth daily, Disp: 30 tablet, Rfl: 0     megestrol (MEGACE) 20 MG tablet, Take 1 tablet (20 mg) by mouth daily, Disp: 30 tablet, Rfl: 0     melatonin 5 MG tablet, Take 1 tablet (5 mg) by mouth At Bedtime, Disp: 30 tablet, Rfl: 0     memantine (NAMENDA) 10 MG tablet, Take 1 tablet (10 mg) by mouth 2 times daily, Disp: 60 tablet, Rfl: 0     mirtazapine (REMERON) 15 MG tablet, Take 1 tablet (15 mg) by mouth At Bedtime, Disp: 30 tablet, Rfl: 0     multivitamin, therapeutic (THERA-VIT) TABS tablet, Take 1 tablet by mouth daily, Disp: 30 tablet, Rfl: 0     polyethylene glycol (MIRALAX) 17 g packet, Take 17 g by mouth 2 times daily as needed for constipation, Disp: 60 packet, Rfl: 0     salmeterol (SEREVENT) 50 MCG/DOSE inhaler, Inhale 1 puff into the lungs 2 times daily, Disp: 1 each, Rfl: 0     thiamine (B-1) 100 MG tablet, Take 1 tablet (100 mg) by mouth daily, Disp: 30 tablet, Rfl: 0     melatonin 3 MG tablet, Take 1 tablet (3 mg) by mouth nightly as needed for sleep, Disp: , Rfl:     Medication adherence issues: MS Med Adherence Y/N: Yes, Hospitalization  Medication side effects: MEDICATION SIDE EFFECTS: no side effects reported         DISCHARGE PLAN   1.  Education given regarding diagnostic and treatment options with risks, benefits and alternatives with adequate verbalization of  understanding.  2.  Discharge to return to assisted living facility.  Upon detailed review of risk factors, patient amenable for release.   3.  Continue aforementioned medications and associated medication changes with follow-up by outpatient mental health provider.  4.  Crisis management planning in place.    5.  Continue efforts for sobriety.    .  Nursing and  to review further discharge recommendations.     TOTAL TIME:  Greater than 30 minutes for discharge planning.    This note was created with help of Dragon dictation system. Grammatical / typing errors are not intentional.    Ginger Dubon MD

## 2022-10-13 NOTE — PLAN OF CARE
CTC: Called and left a message for pt's guardian requesting return of pt's provisional discharge so that it can be sent to pt's  for signing.    CTC: Sent discharge information to [facility] staff - Trace  Received signed provisional discharge agreement from pt's guardian

## 2022-10-13 NOTE — PLAN OF CARE
Goal Outcome Evaluation:    Plan of Care Reviewed With: patient          Pt isolative to bed. Refused to come to lounge for dinner and ate in his room. Appetite good. Eye contact intermittent. Affect flat. Poverty of thought and speech. Calm, polite, cooperative. Med-compliant. No complaints of pain or side effects. Continue with current treatment plan and recommendations. Continue to monitor and reassess symptoms. Monitor response to medications. Monitor progress towards treatment goals. Encourage groups and participation.

## 2022-10-13 NOTE — PLAN OF CARE
Problem: Behavioral Health Plan of Care  Goal: Plan of Care Review  Recent Flowsheet Documentation  Taken 10/13/2022 1010 by Juarez Murphy RN  Patient Agreement with Plan of Care: agrees  Goal: Adheres to Safety Considerations for Self and Others  Intervention: Develop and Maintain Individualized Safety Plan  Recent Flowsheet Documentation  Taken 10/13/2022 1010 by Juarez Murphy RN  Safety Measures: environmental rounds completed  Goal: Absence of New-Onset Illness or Injury  Intervention: Identify and Manage Fall Risk  Recent Flowsheet Documentation  Taken 10/13/2022 1010 by Juarez Murphy RN  Safety Measures: environmental rounds completed  Goal: Optimized Coping Skills in Response to Life Stressors  Intervention: Promote Effective Coping Strategies  Recent Flowsheet Documentation  Taken 10/13/2022 1010 by Juarez Murphy RN  Supportive Measures:   active listening utilized   decision-making supported   self-care encouraged   self-responsibility promoted   verbalization of feelings encouraged   goal-setting facilitated   positive reinforcement provided  Goal: Develops/Participates in Therapeutic [place] to Support Successful Transition  Intervention: Foster Therapeutic [place]  Recent Flowsheet Documentation  Taken 10/13/2022 1010 by Juarez Murphy RN  Trust Relationship/Rapport:   care explained   thoughts/feelings acknowledged   reassurance provided   emotional support provided   choices provided   questions encouraged     Problem: Cognitive Impairment (Psychotic Signs/Symptoms)  Goal: Optimal Cognitive Function (Psychotic Signs/Symptoms)  Intervention: Support and Promote Cognitive Ability  Recent Flowsheet Documentation  Taken 10/13/2022 1010 by Juarez Murphy RN  Trust Relationship/Rapport:   care explained   thoughts/feelings acknowledged   reassurance provided   emotional support provided   choices provided   questions encouraged     Problem: Decreased Participation and Engagement (Psychotic  Signs/Symptoms)  Goal: Increased Participation and Engagement (Psychotic Signs/Symptoms)  Intervention: Facilitate Participation and Engagement  Recent Flowsheet Documentation  Taken 10/13/2022 1010 by Juarez Murphy RN  Supportive Measures:   active listening utilized   decision-making supported   self-care encouraged   self-responsibility promoted   verbalization of feelings encouraged   goal-setting facilitated   positive reinforcement provided     Problem: Mood Impairment (Psychotic Signs/Symptoms)  Goal: Improved Mood Symptoms (Psychotic Signs/Symptoms)  Intervention: Optimize Emotion and Mood  Recent Flowsheet Documentation  Taken 10/13/2022 1010 by Juarez Murphy RN  Supportive Measures:   active listening utilized   decision-making supported   self-care encouraged   self-responsibility promoted   verbalization of feelings encouraged   goal-setting facilitated   positive reinforcement provided     Problem: Psychomotor Impairment (Psychotic Signs/Symptoms)  Goal: Improved Psychomotor Symptoms (Psychotic Signs/Symptoms)  Intervention: Manage Psychomotor Movement  Recent Flowsheet Documentation  Taken 10/13/2022 1010 by Juarez Murphy RN  Patient Performed Hygiene: bath, partial  Activity (Behavioral Health):   up ad holley   activity encouraged   activity adjusted per tolerance     Problem: Social, Occupational or Functional Impairment (Psychotic Signs/Symptoms)  Goal: Enhanced Social, Occupational or Functional Skills (Psychotic Signs/Symptoms)  Intervention: Promote Social, Occupational and Functional Ability  Recent Flowsheet Documentation  Taken 10/13/2022 1010 by Juarez Murphy RN  Trust Relationship/Rapport:   care explained   thoughts/feelings acknowledged   reassurance provided   emotional support provided   choices provided   questions encouraged     Problem: Depression  Goal: Improved Mood  Intervention: Monitor and Manage Depressive Symptoms  Recent Flowsheet Documentation  Taken 10/13/2022 1010 by  Juarez Murphy RN  Supportive Measures:   active listening utilized   decision-making supported   self-care encouraged   self-responsibility promoted   verbalization of feelings encouraged   goal-setting facilitated   positive reinforcement provided     Problem: Fall Injury Risk  Goal: Absence of Fall and Fall-Related Injury  Intervention: Identify and Manage Contributors  Recent Flowsheet Documentation  Taken 10/13/2022 1010 by Juarez Murphy RN  Self-Care Promotion: independence encouraged     Problem: Suicide Risk  Goal: Absence of Self-Harm  Intervention: Assess Risk to Self and Maintain Safety  Recent Flowsheet Documentation  Taken 10/13/2022 1010 by Juarez Murphy RN  Behavior Management: impulse control promoted  Intervention: Promote Psychosocial Wellbeing  Recent Flowsheet Documentation  Taken 10/13/2022 1010 by Juarez Murphy RN  Supportive Measures:   active listening utilized   decision-making supported   self-care encouraged   self-responsibility promoted   verbalization of feelings encouraged   goal-setting facilitated   positive reinforcement provided  Sleep/Rest Enhancement:   consistent schedule promoted   medication   regular sleep/rest pattern promoted     Problem: Sleep Disturbance  Goal: Adequate Sleep/Rest  Intervention: Promote Sleep/Rest  Recent Flowsheet Documentation  Taken 10/13/2022 1010 by Juarez Murphy RN  Sleep/Rest Enhancement:   consistent schedule promoted   medication   regular sleep/rest pattern promoted   Goal Outcome Evaluation:    Plan of Care Reviewed With: patient      Patient discharged [facility] via EMS ride to [facility]Memory care. Patient given all belongings and medications. Denies SI, SIB. All appropriate paper work given to EMS and or faxed to involved parties.

## 2023-01-02 NOTE — PLAN OF CARE
Patient presents with a blunted affect. Mood presents as irritable yet calm. Is cooperative. Speech is mumbled. Eye contact is fair. Alert x1, self only.  Thought content presents as perseveration. Patient focused on wanting cigarettes. Patient denies depression, anxiety, suicidal ideation, SIB, and homicidal ideation. Patient denies auditory/visual hallucinations, yet appears to be responding to internal stimuli; as evidenced by, staring into space and lips moving as if talking to imaginary person or persons. Medication compliant. No medication cheeking noted. Ate approximately 50% of evening meal and bites only of Magic Cup. Drank 995cc's of fluids. Had small incontinent BM x1 this shift. See I&O flowsheet for details. Patient showered and washed his hair this shift. Full bed change was completed with fresh linens applied. New order for Egg Crate mattress obtained for patient comfort and was applied to bed. Patient spent majority of this shift in his room in bed. Spent one hour out in the patient lounge this shift after his shower. No interaction with staff or peers noted.     /71   Pulse 79   Temp 97.9  F (36.6  C) (Tympanic)   Resp 16   Ht 1.829 m (6')   Wt 87.4 kg (192 lb 9.6 oz)   SpO2 95%   BMI 26.12 kg/m      Patient's temperature was elevated at the start of this shift, was 99.2. PRN Tylenol given. Upon recheck and at HS temperature was WNL. HR at HS was somewhat tachycardic, but patient had just showered. See Vitals flowsheet for details.   Hemostasis: Drysol

## 2023-01-03 NOTE — PROGRESS NOTES
Sepsis Week 3 Survey    Flowsheet Row Responses   [facility] facility patient discharged from? [facility]   Does the patient have one of the following disease processes/diagnoses(primary or secondary)? Sepsis   Week 3 attempt successful? No   Unsuccessful attempts Attempt 2          CASTILLO ELISE - Licensed Nurse   [facility]    Medicine Progress Note - Hospitalist Service       Date of Admission:  9/9/2020  Assessment & Plan       John Juárez is a 55 year old male with PMHx of schizophrenia, hx of TBI, alcohol use disorder, COPD, hyperlipidemia and hypothyroidism who was admitted on 9/9/2020 for evaluation of aggressive behavior at his group home.  He has been evaluated by Psychiatry and his medical condition remains stable, though his group home is now unwilling to take him back and thus hospitalization has been prolonged awaiting new placement.      Neurocognitive disorder dt hx of TBI and alcohol use disorder  Schizophrenia  Under commitment  Had been residing in group home prior to admission.  Brought to hospital for evaluation of aggressive behaviors. Group home now unwilling to take him back. Has had numerous CODE 21s called this stay. Seen by psych, meds adjusted. Is currently under civil commitment and court hold through [facility] until 7/31/21. Earlier on in stay, psych had recommended geripsych placement but per evaluation on 12/18, no longer felt this was needed and recommended to continue current meds. QTc 428 on EKG on 12/18. Seen again by psych on 1/5/21 and again not felt to need inpatient psych stay.   - On Haldol 10mg TID, memantine 5 mg daily, venlafaxine 75 mg daily, benztropine 0.25 mg BID  - Haloperidol, olanzapine, and lorazepam PRN for acute agitation  - Door alarm in place and necessary as patient has attempted elopement as recent as 1/16  - SW following for placement     Baseline Hypotension  Systolic BP generally around 90s-100s . Asymptomatic. No concerns.     Dyslipidemia  LDL 43, HDL 32 Jan 2020.   - continue atorvastatin     COPD  Chronic and stable on RA.   - continue salmeterol, albuterol prn     Hypothyroidism  TSH 3.18 Sept 2020.   - continue levothyroxine     Tobacco use disorder  Nicotine patch and gum PRN.     Resting tremor of upper extremities  No  acute issues      Asymptomatic COVID19 screening for discharge planning: negative on 12/6/2020        Diet: Room Service  Combination Diet Regular Diet Adult; Safe Tray - NO utensils  Diet    DVT Prophylaxis: Pneumatic Compression Devices and Ambulate every shift  Valentin Catheter: not present  Code Status: Full Code           Disposition Plan   Expected discharge: pending placement  Entered: Sanjay Cantu MD 01/18/2021, 1:39 PM       The patient's care was discussed with the Bedside Nurse and Patient.    Sanjay Cantu MD  Hospitalist Service  [facility]  Contact information available via [facility] Paging/Directory    ______________________________________________________________________    Interval History   Seen and examined midday. No new complaints. Denies sob or pain. Watching TV and calm.  He requested a pop, which I was happy to provide.     Data reviewed today: I reviewed all medications, new labs and imaging results over the last 24 hours. I personally reviewed no images or EKG's today.    Physical Exam   Vital Signs: Temp: 96.3  F (35.7  C) Temp src: Oral BP: 119/81 Pulse: 74   Resp: 16 SpO2: 98 % O2 Device: None (Room air)    Weight: 182 lbs 0 oz    GEN: nad, calm  HEENT: eomi, mmm  RESP: no increased work of respiration  ABDO: nondistended, nontender  EXT: no edema  NEURO: awake and alert, oriented to self  PSYCH: calm, flat affect    Data   NNL

## 2023-01-30 NOTE — DISCHARGE INSTRUCTIONS
TAKE THE PROVIDED MAGNESIUM CITRATE WHEN YOU GET HOME -- 1/2 BOTTLE NOW, THE OTHER 1/2 IN 4 HOURS IF YOU DO NOT HAVE ADEQUATE RESULTS.  I RECOMMMEND YOU TAKE MIRALAX EVERY DAY TO MAKE SURE YOU ARE HAVING BOWEL MOVEMENTS.  PLEASE LET YOUR STAFF KNOW AT THE GROUP HOME IF IT HAS BEEN THREE DAYS SINCE YOUR LAST BOWEL MOVEMENT.  RETURN TO THE ER FOR ANY INCREASED PAIN OR ANY OTHER EMERGENT CONCERNS.    Discharge Instructions  Constipation  Constipation can cause severe cramping pain and your provider thinks this might be the cause of your abdominal pain (belly pain) today.  People usually recognize that they are constipated because they have difficulty having bowel movements, are not having bowel movements frequently enough, or are not having large enough bowel movements. Sometimes, especially in children or older people, you do not recognize that you are constipated until it becomes severe. The most common causes of constipation are a lack of exercise and not eating enough fruits, vegetables, and whole grains. Constipation can also be a side effect of medications, such as narcotics, or may be caused by a disease of the digestive system.    Generally, every Emergency Department visit should have a follow-up clinic visit with either a primary or a specialty clinic/provider. Please follow-up as instructed by your emergency provider today. Sometimes, chronic constipation requires further testing to determine the cause. If you are over 50 years old, you may need a colonoscopy if you have not had one before.     Return to the Emergency Department if:  Your abdominal pain worsens or does not improve after a bowel movement.  You become very weak.  You get a temperature above 102oF or as directed by your provider.  You have blood in your stools (bright red or black, tarry stools).  You keep vomiting (throwing up) or cannot drink liquids.  Your see blood when you vomit.  Your stomach gets bloated or bigger.  You have new symptoms  or anything that worries you.    What can I do to help myself?  If your provider gave you a cathartic medication, like magnesium citrate or GoLytely  (polyethylene glycol), you can expect to have cramps and gas pains after taking it. You can expect to have a number of bowel movements and even diarrhea (loose or watery stools) in the course of clearing your bowels.  You will know your bowels have been cleaned out after you pass clear liquid. The cramps and gas should let up after you have emptied your bowels. You may want to wait until morning to take this type of medication so you aren t up in the night.   Sometimes instead of cathartics, we recommend laxatives like milk of magnesia to move your bowels more slowly, or an enema to help the bowels to move. Read and follow the package directions, or follow your provider s instructions.  Once you have become very constipated, it takes time for your bowels to return to normal and you need to be very careful to prevent becoming constipated again. Take a laxative if you do not move your bowels at least every two days.     Eat foods that have a lot of fiber. Good choices are fruits, vegetables, prune juice, apple juice, and high fiber cereal. Limit dairy products such as milk and cheese, since these can make constipation worse.   Drink plenty of water.   When you feel the need to go to the bathroom, go to the bathroom. Do not hold it.  Miralax , Metamucil , Colace , Senna or fiber supplements can be used daily.  Miralax  daily is often the best choice for children.  If you were given a prescription for medicine here today, be sure to read all of the information (including the package insert) that comes with your prescription.  This will include important information about the medicine, its side effects, and any warnings that you need to know about.  The pharmacist who fills the prescription can provide more information and answer questions you may have about the medicine.   If you have questions or concerns that the pharmacist cannot address, please call or return to the Emergency Department.   Remember that you can always come back to the Emergency Department if you are not able to see your regular provider in the amount of time listed above, if you get any new symptoms, or if there is anything that worries you.     no no fever/no chills/no sweating/no anorexia/no weight loss/no weight gain/no polyphagia/no polyuria/no polydipsia/no malaise/no fatigue

## 2023-02-23 NOTE — PLAN OF CARE
Goal Outcome Evaluation:      Patient slept well the whole night for 9 hours. Nothing unusual noted. NO complaints made. No behavioral issues observed.                 n/a

## 2023-06-27 NOTE — PLAN OF CARE
June 27, 2023        Aden Menard  [address] [place] [address]    To Whom It May Concern:    This is to certify Aden Menard was evaluated on 06/27/23 and is unable to return to work.    CDC guidelines for return to work are as follows:    Aden Menard should self-isolate for 5 days.  After isolation is completed, he may return to work, wearing a mask while around others.  Perform a home COVID-19 test on days 6 and 8. If both are negative, masking can be discontinued.    **The loss of taste and smell may persist for weeks or months after recovery and do not need to delay the end of isolation.     Per CDC recommendations, employers should not require a COVID-19 test result or a healthcare provider’s note for employees who are sick to validate their illness, qualify for sick leave, or to return to work.  The Coronavirus is a rapidly evolving situation and recommendations are changing regularly to prevent spread of the disease and further loss of life.  At this time it is our recommendations that he takes the next 5 days off and wear a mask when encountering any public starting 6/27/2023  Thank you for your understanding during these unusual times.     Electronically signed by:     Celia Hutton, CNP                 [address] [place] [address]     Patient has spent the entire shift in the milieu. Patient denies suicidal ideation and self injurious thoughts. Denies anxiety and depression. Denies auditory and visual hallucinations. Continues to be observed talking to himself. Ate dinner. Med compliant.     Patient evaluation continues. Assessed mood,anxiety,thoughts and behavior.     Patient gradually progressing towards goals.    Patient is encouraged to participate in groups and assisted to develop healthy coping skills.     VS reviewed: /76   Pulse 94   Temp 97.8  F (36.6  C) (Oral)   Resp 16   Ht 1.829 m (6')   Wt 89.2 kg (196 lb 9.6 oz)   SpO2 98%   BMI 26.66 kg/m      Length of stay: 34    Refer to daily team meeting notes for individualized plan of care. Nursing will continue to assess.

## 2023-07-17 NOTE — PLAN OF CARE
Removed intact Showing agitation this shift, slamming doors, angry about being followed. He is unsteady on his feet needed assistance when trying to get from room to the phone in the cruz. , Hitting his hands a couple of times today.  Angry, tense affect, mood labile. Poor ADL'S refuses shower.  He was medication compliant, and also took prn med for agitation..  Will continue to monitor.

## 2023-07-19 NOTE — PLAN OF CARE
Cognitive Concerns/ Orientation : Confused; disoriented to place, time, situation.  BEHAVIOR & AGGRESSION TOOL COLOR: Green, yellow at times  CIWA SCORE: NA  ABNL VS/O2: VSS on RA  MOBILITY: Ind in room  PAIN MANAGMENT: Denies pain  DIET: Reg  BOWEL/BLADDER: Up to BR  ABNL LAB/BG:   DRAIN/DEVICES: None  TELEMETRY RHYTHM: NA  SKIN: Intact  TESTS/PROCEDURES: None  D/C DATE: Pending placement and guardianship  Discharge Barriers: Placement and guardianship  OTHER IMPORTANT INFO: No concerns overnight.   Seen 6/20/22023

## 2023-09-13 NOTE — PLAN OF CARE
DATE & TIME: 5/16/21 [id number]    Cognitive Concerns/ Orientation : Pt A/Ox1, alert to self, baseline. Neuros intact except slightly unsteady gait. Pt denies dizziness this evening. Hallucinations present.  BEHAVIOR & AGGRESSION TOOL COLOR: Yellow, hx of behavior issues, agitation, elopement  ABNL VS/O2: VSS on RA  MOBILITY: SBA with gait belt, fall risk. Gait improving today.  PAIN MANAGMENT: Denies  DIET: Regular  BOWEL/BLADDER: Continent  SKIN: Intact  D/C DATE: Discharge pending guardianship and placement  Discharge Barriers: Civil commitment through [facility] until 7/31/21  OTHER IMPORTANT INFO: Contact precautions, MRSA reswab this morning.      No risk alerts present

## 2023-12-18 NOTE — PLAN OF CARE
DATE & TIME: 9/22/20, 7a to 3p  Cognitive Concerns/ Orientation : A & O to self, calm and cooperative, obeys commands  BEHAVIOR & AGGRESSION TOOL COLOR: Green   CIWA SCORE: N/A   ABNL VS/O2: VSS on RA   MOBILITY: independent in the room  PAIN MANAGMENT: Denies   DIET: Regular; tolerating well   BOWEL/BLADDER: continent , ambulates to the BR  ABNL LAB/BG: WDL   DRAIN/DEVICES: N/A  TELEMETRY RHYTHM: N/A  SKIN: Scattered bruising; intact   TESTS/PROCEDURES: N/A  D/C DAY/GOALS/PLACE: pending placement   OTHER IMPORTANT INFO: pt. On court hold, needs help in ordering food,continue to monitor and assess.   Tries to get out of the room at time, can easily be redirected.       2.04

## 2024-01-04 NOTE — PROGRESS NOTES
[facility]    Medicine Progress Note - Hospitalist Service       Date of Admission:  9/9/2020  Date of Service: 04/18/2021    Assessment & Plan          John Juárez is a 55 year old male with PMHx of schizophrenia, hx of TBI, alcohol use disorder, COPD, hyperlipidemia and hypothyroidism who was admitted on 9/9/2020 for evaluation of aggressive behavior at his group home.       Was evaluated by Psychiatry and his condition stabilized, though his group home was unwilling to take him back and thus hospitalization has been prolonged awaiting new placement and guardianship. Under civil commitment through [facility]     Neurocognitive disorder dt hx of TBI and alcohol use disorder  Schizophrenia  Under commitment  Had been residing in group home prior to admission.  Brought to hospital for evaluation of aggressive behaviors. Group home now unwilling to take him back. Has had numerous CODE 21s called this stay. Seen by psych, meds adjusted. Is currently under civil commitment and court hold through [facility] until 7/31/21.   -- conts on Haldol 10mg TID (timed for when patient tends to be most agitated), memantine 5mg daily, venlafaxine 75mg daily, benztropine 1mg BID   -- prns available for agitation (including Haldol, Zyprexa and Ativan)  -- SW following for placement, placement will occur once guardianship is established     Possible Tardive Dyskinesia vs EPSE  Per psych assessment on 4/1/21, noted to have possible tardive dyskinesia vs extrapyramidal side effects of meds. At that time, recommended to increase Cogentin to 1mg BID and add vitamin E 800 international unit(s) daily.      Back cellulitis in 1/2021: Resolved  Noted on 1/24/21, initiated on cephalexin at that time. Received local wound care and completed 7-day course of antibiotics on 1/30     Baseline Hypotension  BP 90-110s systolic on average. Asymptomatic. No concerns     Hyperlipidemia  Chronic and stable on [facility]  dose ASA and  atorvastatin     COPD  Not O2 dependent. Chronic and stable on salmeterol, albuterol prn     Hypothyroidism  Chronic and stable on levothyroxine     Tobacco use disorder  Using nicotine patch and PRN gum     Resting tremor of upper extremities  No acute issues           Diet: Room Service  Diet  Combination Diet Regular Diet Adult; Safe Tray - with utensils  Snacks/Supplements Adult: Other; Bedtime snack; Between Meals  Room Service    DVT Prophylaxis: Pneumatic Compression Devices  Valentin Catheter: not present  Code Status: Full Code           Disposition Plan   Expected discharge: when placement found    Entered: Jose Walker MD 04/18/2021, 4:53 PM       The patient's care was discussed with the Bedside Nurse and Patient.    Jose Walker MD  Hospitalist Service  [facility]  Contact information available via [facility] Paging/Directory    ______________________________________________________________________    Interval History      No acute events overnight  No complaints today, walking hallways  Asking to be released.  Door alarm active.    No other nursing concerns.    Data reviewed today: I reviewed all medications, new labs and imaging results over the last 24 hours.    Physical Exam   Vital Signs: Temp: 97.7  F (36.5  C) Temp src: Oral BP: 118/88 Pulse: 95   Resp: 20 SpO2: 98 % O2 Device: None (Room air)    Weight: 185 lbs 9.6 oz    Exam:  Constitutional:no apparent distress  Cardiovascular: RRR.  No  Murmurs.  Respiratory:normal WOB,b/l equal air entry, no wheezes or crackles   Gastrointestinal: Abdomen soft, non-tender. BS normal. No masses, organomegaly  Extremities :no edema , no clubbing or cyanosis      Data   No lab results found in last 7 days.    No results found for this or any previous visit (from the past 24 hour(s)).  Medications       aspirin  81 mg Oral Daily     atorvastatin  80 mg Oral At Bedtime     benztropine  1 mg Oral BID     docusate sodium  100 mg Oral  BID     haloperidol  10 mg Oral TID     levothyroxine  88 mcg Oral Daily     memantine  5 mg Oral Daily     salmeterol  1 puff Inhalation BID     venlafaxine  75 mg Oral Daily with breakfast     vitamin E  800 Units Oral Daily      4 = No assist / stand by assistance

## 2024-02-23 NOTE — PLAN OF CARE
Problem: Sleep Disturbance (Psychotic Signs/Symptoms)  Goal: Improved Sleep (Psychotic Signs/Symptoms)  Outcome: Ongoing, Progressing   Goal Outcome Evaluation:    Patient slept a total of 9.25 hours.   Did not void the whole shift. No complaints noted the whole night.                Parisa SOLORIO notified me that pt has been given Tdap that is on her allergy list. Dr. Dhaliwal was notified. VS were retaken and pt was monitor for 1 hour without any adverse s/s or seizures noted.   Dania was notified and informed that she will need to be monitor for head injury and allergic reaction or seizure. Please see phone note for more details.

## 2024-03-24 NOTE — TELEPHONE ENCOUNTER
S: Patient remains on station 66 at [facility]. Attending provider requests transfer to inpatient MH bed.    B: Patient has been hospitalized since September of 2020. Has TBI and schizophrenia. Was admitted to medical in September, 2020 with increased agitation. Patient contracted covid and required long-term hospitalization. Patient now medically cleared. Group home refused to take patient back. Patient was reviewed for possible mental health admission in May, 2021 and medical director of psychiatry declined citing patient's failure to meet criteria.    A: Medical unit social work team working with  to file for guardianship and find long-term residential placement. Patient continues to have agitation and aggressive outbursts and has diagnosis of schizophrenia but denies SI/HI and does not have acute psychosis.    R: Psychiatry reviewed patient and finds he does not meet criteria for MH admission. Psychiatry provider spoke with Dr. Barrientos on 6/24 to explain declination. Patient remains on station 66 until outside placement secured. Inpatient Intake no longer seeking MH placement for patient.   none

## 2024-05-28 NOTE — PROGRESS NOTES
01/19/22 1049   Engagement   Intervention Group   Topic Detail OT: Cognitive games to promote healthy leisure, concentration and cognitive flexibility for management of mental health symptoms and cravings.   Attendance Did not attend       used

## 2024-10-05 NOTE — PLAN OF CARE
Problem: Sleep Disturbance (Psychotic Signs/Symptoms)  Goal: Improved Sleep (Psychotic Signs/Symptoms)  Outcome: Ongoing, Progressing   Goal Outcome Evaluation:    Patient slept a total of 9.5 hours. No behavioral concerns raised the whole shift.                    No

## 2025-03-11 NOTE — PROGRESS NOTES
Hospitalist brief chart check note:    Discussed with RN, no active issues, patient's status remains the same. No agitation.   Pending placement.    Hermilo Sousa MD  Hospitalist   Physical Therapy Visit    Visit Type: Daily Treatment Note  Visit: 7  Referring Provider: Blake Byrd MD  Medical Diagnosis (from order): M77.12 - Lateral epicondylitis of left elbow     SUBJECTIVE                                                                                                               The elbow is doing well. It's a little sore but overall been improving.       OBJECTIVE                                                                                                                         Palpation  Decreased tenderness along lateral epicondyle (left)                   Treatment     Therapeutic Exercise  Passive range of motion and stretching to left elbow/forearm   - left elbow extension. Palm up, down, neutral   - supination and pronation   - shoulder flexion  Wrist stretching into flexion and extension (elbow extended) 2 x 30 seconds each   Standing wrist stretch into flexion and extension 2 x 30 second with elbow extended  Open book at wall (full elbow extension) x 10 yellow band       Manual Therapy   Soft tissue mobilization to extensor mass, supinator, tricep distal  - left     Neuromuscular Re-Education  Pushup at wall on bolster x 10 each  - elbow in   - elbows out   Plank at wall with bolster rolls x 15   Row plank on ball x 10   Marylin press on ball x 10   Wall on ball + x 10 repetitions   Serratus roll up foam roll x 10 repetitions     Next:  Push/pull     Skilled input: verbal instruction/cues, tactile instruction/cues and demonstration    Writer verbally educated and received verbal consent for hand placement, positioning of patient, and techniques to be performed today from patient for hand placement and palpation for techniques, clothing adjustments for techniques and therapist position for techniques as described above and how they are pertinent to the patient's plan of care.  Home Exercise Program  Access Code: [email or web address]  URL:  [email or web address]  Date: 03/11/2025  Prepared by: Magda Strong    Exercises  - Shoulder External Rotation and Scapular Retraction with Resistance  - 1 x daily - 3 x weekly - 3 sets - 10 reps  - Seated Eccentric Wrist Extension  - 1 x daily - 3 x weekly - 3 sets - 10 reps  - Forearm Pronation with Dumbbell  - 1 x daily - 3 x weekly - 3 sets - 10 reps  - Standing Shoulder Extension ROM with Dowel  - 1 x daily - 7 x weekly - 3 sets - 10 reps  - Standing Single Arm Eccentric Bicep Curl Pronated then Supinated  - 1 x daily - 3 x weekly - 3 sets - 10 reps  - Standing Wrist Extension Stretch  - 1 x daily - 7 x weekly - 3 sets - 20-30 seconds hold  - Standing Wrist Flexion Stretch  - 1 x daily - 7 x weekly - 3 sets - 20-30 seconds hold  - Wrist Prayer Stretch  - 1 x daily - 7 x weekly - 3 sets - 20 seconds hold  - Reverse Prayer Stretch  - 1 x daily - 7 x weekly - 3 sets - 20 seconds hold      ASSESSMENT                                                                                                            Improved soft tissue restrictions noted in the left elbow. We added closed chain interventions today to work on forces through the joint. This was well tolerated. From here, she needs to be progressed through stronger (more force) an quicker movements through the joint, such as with pushing and pulling in open and closed chains to open doors, move boxes. Consider discharge next session.   Education:   - Results of above outlined education: Verbalizes understanding    PLAN                                                                                                                           Suggestions for next session as indicated: Progress per plan of care       Therapy procedure time and total treatment time can be found documented on the Time Entry flowsheet